# Patient Record
Sex: FEMALE | Race: WHITE | NOT HISPANIC OR LATINO | Employment: OTHER | URBAN - METROPOLITAN AREA
[De-identification: names, ages, dates, MRNs, and addresses within clinical notes are randomized per-mention and may not be internally consistent; named-entity substitution may affect disease eponyms.]

---

## 2017-04-13 ENCOUNTER — ALLSCRIPTS OFFICE VISIT (OUTPATIENT)
Dept: OTHER | Facility: OTHER | Age: 62
End: 2017-04-13

## 2017-04-13 DIAGNOSIS — R73.03 PREDIABETES: ICD-10-CM

## 2017-05-12 ENCOUNTER — GENERIC CONVERSION - ENCOUNTER (OUTPATIENT)
Dept: OTHER | Facility: OTHER | Age: 62
End: 2017-05-12

## 2017-05-12 LAB
A/G RATIO (HISTORICAL): 1.5 (ref 1.2–2.2)
ALBUMIN SERPL BCP-MCNC: 4.3 G/DL (ref 3.5–5.5)
ALP SERPL-CCNC: 98 IU/L (ref 39–117)
ALT SERPL W P-5'-P-CCNC: 14 IU/L (ref 0–32)
AMBIG ABBREV CMP14 DEFAULT (HISTORICAL): NORMAL
AST SERPL W P-5'-P-CCNC: 11 IU/L (ref 0–40)
BILIRUB SERPL-MCNC: 0.2 MG/DL (ref 0–1.2)
BUN SERPL-MCNC: 16 MG/DL (ref 6–24)
BUN/CREA RATIO (HISTORICAL): 25 (ref 9–23)
CALCIUM SERPL-MCNC: 9.6 MG/DL (ref 8.7–10.2)
CHLORIDE SERPL-SCNC: 101 MMOL/L (ref 96–106)
CO2 SERPL-SCNC: 23 MMOL/L (ref 18–29)
CREAT SERPL-MCNC: 0.65 MG/DL (ref 0.57–1)
EGFR AFRICAN AMERICAN (HISTORICAL): 112 ML/MIN/1.73
EGFR-AMERICAN CALC (HISTORICAL): 97 ML/MIN/1.73
GLUCOSE SERPL-MCNC: 104 MG/DL (ref 65–99)
POTASSIUM SERPL-SCNC: 4.6 MMOL/L (ref 3.5–5.2)
SODIUM SERPL-SCNC: 141 MMOL/L (ref 134–144)
TOT. GLOBULIN, SERUM (HISTORICAL): 2.9 G/DL (ref 1.5–4.5)
TOTAL PROTEIN (HISTORICAL): 7.2 G/DL (ref 6–8.5)

## 2017-05-13 LAB — HBA1C MFR BLD HPLC: 5.8 % (ref 4.8–5.6)

## 2017-08-15 ENCOUNTER — ALLSCRIPTS OFFICE VISIT (OUTPATIENT)
Dept: OTHER | Facility: OTHER | Age: 62
End: 2017-08-15

## 2017-08-15 LAB
BILIRUB UR QL STRIP: NORMAL
CLARITY UR: NORMAL
COLOR UR: YELLOW
GLUCOSE (HISTORICAL): NORMAL
HGB UR QL STRIP.AUTO: NORMAL
KETONES UR STRIP-MCNC: NORMAL MG/DL
LEUKOCYTE ESTERASE UR QL STRIP: NORMAL
NITRITE UR QL STRIP: NORMAL
PH UR STRIP.AUTO: 5 [PH]
PROT UR STRIP-MCNC: NORMAL MG/DL
SP GR UR STRIP.AUTO: 1.01
UROBILINOGEN UR QL STRIP.AUTO: NORMAL

## 2017-10-14 ENCOUNTER — ALLSCRIPTS OFFICE VISIT (OUTPATIENT)
Dept: OTHER | Facility: OTHER | Age: 62
End: 2017-10-14

## 2017-10-14 DIAGNOSIS — Z12.31 ENCOUNTER FOR SCREENING MAMMOGRAM FOR MALIGNANT NEOPLASM OF BREAST: ICD-10-CM

## 2017-10-14 DIAGNOSIS — Z13.820 ENCOUNTER FOR SCREENING FOR OSTEOPOROSIS: ICD-10-CM

## 2017-10-23 ENCOUNTER — ALLSCRIPTS OFFICE VISIT (OUTPATIENT)
Dept: OTHER | Facility: OTHER | Age: 62
End: 2017-10-23

## 2017-10-23 LAB
BILIRUB UR QL STRIP: NORMAL
CLARITY UR: NORMAL
COLOR UR: YELLOW
GLUCOSE (HISTORICAL): NORMAL
HGB UR QL STRIP.AUTO: POSITIVE
KETONES UR STRIP-MCNC: NORMAL MG/DL
LEUKOCYTE ESTERASE UR QL STRIP: NORMAL
NITRITE UR QL STRIP: NORMAL
PH UR STRIP.AUTO: 5 [PH]
PROT UR STRIP-MCNC: NORMAL MG/DL
SP GR UR STRIP.AUTO: 1.01
UROBILINOGEN UR QL STRIP.AUTO: NORMAL

## 2017-10-24 ENCOUNTER — LAB REQUISITION (OUTPATIENT)
Dept: LAB | Facility: HOSPITAL | Age: 62
End: 2017-10-24
Payer: COMMERCIAL

## 2017-10-24 DIAGNOSIS — R30.0 DYSURIA: ICD-10-CM

## 2017-10-24 PROCEDURE — 87086 URINE CULTURE/COLONY COUNT: CPT | Performed by: INTERNAL MEDICINE

## 2017-10-25 LAB — BACTERIA UR CULT: NORMAL

## 2017-10-27 ENCOUNTER — GENERIC CONVERSION - ENCOUNTER (OUTPATIENT)
Dept: OTHER | Facility: OTHER | Age: 62
End: 2017-10-27

## 2017-10-27 LAB
A/G RATIO (HISTORICAL): 1.7 (ref 1.2–2.2)
ALBUMIN SERPL BCP-MCNC: 4.3 G/DL (ref 3.6–4.8)
ALP SERPL-CCNC: 104 IU/L (ref 39–117)
ALT SERPL W P-5'-P-CCNC: 15 IU/L (ref 0–32)
AMBIG ABBREV CMP14 DEFAULT (HISTORICAL): NORMAL
AST SERPL W P-5'-P-CCNC: 17 IU/L (ref 0–40)
BASOPHILS # BLD AUTO: 0.1 X10E3/UL (ref 0–0.2)
BASOPHILS # BLD AUTO: 1 %
BILIRUB SERPL-MCNC: 0.3 MG/DL (ref 0–1.2)
BUN SERPL-MCNC: 13 MG/DL (ref 8–27)
BUN/CREA RATIO (HISTORICAL): 19 (ref 12–28)
CALCIUM SERPL-MCNC: 9.4 MG/DL (ref 8.7–10.3)
CHLORIDE SERPL-SCNC: 99 MMOL/L (ref 96–106)
CHOLEST SERPL-MCNC: 183 MG/DL (ref 100–199)
CHOLEST/HDLC SERPL: 2.9 RATIO UNITS (ref 0–4.4)
CO2 SERPL-SCNC: 23 MMOL/L (ref 18–29)
CREAT SERPL-MCNC: 0.68 MG/DL (ref 0.57–1)
DEPRECATED RDW RBC AUTO: 13 % (ref 12.3–15.4)
EGFR AFRICAN AMERICAN (HISTORICAL): 110 ML/MIN/1.73
EGFR-AMERICAN CALC (HISTORICAL): 95 ML/MIN/1.73
EOSINOPHIL # BLD AUTO: 0.1 X10E3/UL (ref 0–0.4)
EOSINOPHIL # BLD AUTO: 1 %
GLUCOSE SERPL-MCNC: 111 MG/DL (ref 65–99)
HCT VFR BLD AUTO: 37.4 % (ref 34–46.6)
HDLC SERPL-MCNC: 63 MG/DL
HGB BLD-MCNC: 12.5 G/DL (ref 11.1–15.9)
IMM.GRANULOCYTES (CD4/8) (HISTORICAL): 0 %
IMM.GRANULOCYTES (CD4/8) (HISTORICAL): 0 X10E3/UL (ref 0–0.1)
LDLC SERPL CALC-MCNC: 106 MG/DL (ref 0–99)
LYMPHOCYTES # BLD AUTO: 1.9 X10E3/UL (ref 0.7–3.1)
LYMPHOCYTES # BLD AUTO: 20 %
MCH RBC QN AUTO: 27.6 PG (ref 26.6–33)
MCHC RBC AUTO-ENTMCNC: 33.4 G/DL (ref 31.5–35.7)
MCV RBC AUTO: 83 FL (ref 79–97)
MONOCYTES # BLD AUTO: 0.9 X10E3/UL (ref 0.1–0.9)
MONOCYTES (HISTORICAL): 10 %
NEUTROPHILS # BLD AUTO: 6.3 X10E3/UL (ref 1.4–7)
NEUTROPHILS # BLD AUTO: 68 %
PLATELET # BLD AUTO: 356 X10E3/UL (ref 150–379)
POTASSIUM SERPL-SCNC: 4.1 MMOL/L (ref 3.5–5.2)
RBC (HISTORICAL): 4.53 X10E6/UL (ref 3.77–5.28)
SODIUM SERPL-SCNC: 141 MMOL/L (ref 134–144)
TOT. GLOBULIN, SERUM (HISTORICAL): 2.6 G/DL (ref 1.5–4.5)
TOTAL PROTEIN (HISTORICAL): 6.9 G/DL (ref 6–8.5)
TRIGL SERPL-MCNC: 69 MG/DL (ref 0–149)
VLDLC SERPL CALC-MCNC: 14 MG/DL (ref 5–40)
WBC # BLD AUTO: 9.2 X10E3/UL (ref 3.4–10.8)

## 2017-10-28 LAB
CREATININE, URINE (HISTORICAL): 145.7 MG/DL
HBA1C MFR BLD HPLC: 5.6 % (ref 4.8–5.6)
MICROALBUM.,U,RANDOM (HISTORICAL): 219.9 UG/ML
MICROALBUMIN/CREATININE RATIO (HISTORICAL): 150.9 MG/G CREAT (ref 0–30)
T4 FREE SERPL-MCNC: 1.22 NG/DL (ref 0.82–1.77)
TSH SERPL DL<=0.05 MIU/L-ACNC: 1.23 UIU/ML (ref 0.45–4.5)

## 2017-10-31 ENCOUNTER — GENERIC CONVERSION - ENCOUNTER (OUTPATIENT)
Dept: OTHER | Facility: OTHER | Age: 62
End: 2017-10-31

## 2017-11-08 ENCOUNTER — ALLSCRIPTS OFFICE VISIT (OUTPATIENT)
Dept: OTHER | Facility: OTHER | Age: 62
End: 2017-11-08

## 2017-11-10 NOTE — PROGRESS NOTES
Assessment  1  Overactive bladder (596 51) (N32 81)    Plan  Overactive bladder    · Myrbetriq 50 MG Oral Tablet Extended Release 24 Hour; Take 1 tablet once daily   Rx By: Ed Blair; Dispense: 30 Days ; #:30 Tablet Extended Release 24 Hour; Refill: 11; Overactive bladder; AISHWARYA = N; Faxed To: [facility]/PHARMACY #[id number]  · Oxybutynin Chloride ER 10 MG Oral Tablet Extended Release 24 Hour; Take 1tablet daily   Rx By: Ed Blair; Dispense: 30 Days ; #:30 Tablet Extended Release 24 Hour; Refill: 11; Overactive bladder; AISHWARYA = N; Faxed To: [facility]/PHARMACY #[id number]   · Follow-up visit in 6 months Evaluation and Treatment  Follow-up  Status: Hold For -Scheduling,Retrospective Authorization  Requested for: 16OCT1455   Ordered; Overactive bladder; Ordered By: Ed Blair Performed:  Due: 46MFM2060; Last Updated By: Ed Blair; 11/8/2017 2:11:32 PM   · Measure Post Void Residual - POC; Status:Active - Perform Order; Requestedfor:57Qpx5983;    Perform: In Office; MJZ:98UJC4583; Ordered;bladder; Ordered By:Crow Mcfarlane; Discussion/Summary  Discussion Summary:   1  overactive bladder -- managed by Dr Corrina Connelly is happy with the combination of anticholinergic and beta agonistshe will be switched to oxybutynin 10 mg extended release given high co-pay of Vesicare  She'll continue to take Myrbetriq 50 mg daily  Briefly reviewed tertiary treatments of overactive bladder should symptoms worsen in the future  Patient will follow-up in 6 months with a PVR for symptom reassessment  She is aware to contact us earlier if any urologic concern  All questions answered         Chief Complaint  Chief Complaint Free Text Note Form: patient presents for OAB; urge incontinence      History of Present Illness  HPI: Rafat Segovia is a 29-year-old female patient of Dr Leoncio Osborn with a history of overactive bladder presenting for 3 month follow-up   main urinary complaints include urinary urgency, frequency, and urge incontinence  Patient was initially on Vesicare 5 mg and then increased to Vesicare 10 mg with some dry mouth  Her last appointment beta agonist Myrbetriq 50 mg was added  Patient states that the combination of the 2 medications provides her urinary relief in which she is not having incontinence anymore  She still wears one pad per day as a precautionary measure  She denies any significant side effects  does mention that Vesicare has a high co-pay and is interested in trying another anticholinergic  in the office today reveal 65 mL  Review of Systems  Complete-Female Urology:  Constitutional: No fever, no chills, feels well, no tiredness, no recent weight gain or weight loss  Respiratory: No complaints of shortness of breath, no wheezing, no cough, no SOB on exertion, no orthopnea, no PND  Cardiovascular: No complaints of slow heart rate, no fast heart rate, no chest pain, no palpitations, no leg claudication, no lower extremity edema  Gastrointestinal: No complaints of abdominal pain, no constipation, no nausea or vomiting, no diarrhea, no bloody stools  Genitourinary: feelings of urinary urgency,-- Empty sensation,-- incontinence-- and-- stream quality good, but-- no dysuria,-- no urinary hesitancy,-- no hematuria-- and-- no nocturia  Musculoskeletal: No complaints of arthralgias, no myalgias, no joint swelling or stiffness, no limb pain or swelling  Integumentary: No complaints of skin rash or lesions, no itching, no skin wounds, no breast pain or lump  Hematologic/Lymphatic: No complaints of swollen glands, no swollen glands in the neck, does not bleed easily, does not bruise easily  Neurological: No complaints of headache, no confusion, no convulsions, no numbness, no dizziness or fainting, no tingling, no limb weakness, no difficulty walking  ROS Reviewed:   ROS reviewed  Active Problems  1  Bacterial vaginosis (616 10,041 9) (N76 0,B96 89)   2   Benign essential hypertension (401 1) (I10)   3  Candidiasis (112 9) (B37 9)   4  Colonoscopy (Fiberoptic) Screening   5  Depression (311) (F32 9)   6  Dyslipidemia (272 4) (E78 5)   7  Dysuria (788 1) (R30 0)   8  Obesity (278 00) (E66 9)   9  Other abnormal glucose (790 29) (R73 09)   10  Overactive bladder (596 51) (N32 81)   11  Postmenopause bleeding (627 1) (N95 0)   12  Prediabetes (790 29) (R73 03)   13  Vitamin D deficiency (268 9) (E55 9)    Past Medical History  1  History of Abnormal weight gain (783 1) (R63 5)   2  History of Candidiasis (112 9) (B37 9)   3  History of Change in bowel habits (787 99) (R19 4)   4  Depression (311) (F32 9)   5  History of Encounter for screening for malignant neoplasm of colon (V76 51) (Z12 11)   6  History of hyperglycemia (V12 29) (Z86 39)   7  History of obesity (V13 89) (Z86 39)   8  History of screening mammography (V15 89) (Z92 89)   9  Denied: History of substance abuse   10  History of Morbid or severe obesity due to excess calories (278 01) (E66 01)   11  History of S/P bariatric surgery (V45 86) (Z98 84)   12  History of Screening for osteoporosis (V82 81) (Z13 820)   13  History of Symptoms of urinary tract infection (788 99) (R39 9)   14  History of Well adult on routine health check (V70 0) (Z00 00)  Active Problems And Past Medical History Reviewed: The active problems and past medical history were reviewed and updated today  Surgical History  1  History of Cholecystectomy   2  History of Gastric Surgery For Morbid Obesity   3  History of Tonsillectomy  Surgical History Reviewed: The surgical history was reviewed and updated today  Family History  Mother    1  Family history of Family Health Status Of Mother - Alive  Father    2  Family history of Father  At Age 80  Family History    3  Denied: Family history of depression   4  Denied: Family history of substance abuse  Family History Reviewed: The family history was reviewed and updated today         Social History     · Denied: History of Drug use   · Never A Smoker   · Never Drank Alcohol  Social History Reviewed: The social history was reviewed and is unchanged  Current Meds   1  Abilify 5 MG Oral Tablet; one tab day; Therapy: 75GEG0815 to Recorded   2  ARIPiprazole 10 MG Oral Tablet; Therapy: 75XAE0037 to Recorded   3  Aspirin EC 81 MG Oral Tablet Delayed Release; Therapy: 36VIE4020 to Recorded   4  Biotin TABS; Therapy: (0498 72 13 49) to Recorded   5  Gemfibrozil 600 MG Oral Tablet; TAKE 1 TABLET DAILY; Therapy: 65ZXT0422 to (Evaluate:07Gtb4777)  Requested for: 33BAI5006; Last Rx:94Wzu8160 Ordered   6  Iron 28 MG Oral Tablet; Therapy: (0498 72 13 49) to Recorded   7  MetFORMIN HCl - 500 MG Oral Tablet; once daily; Therapy: 38JAB0747 to (Last Rx:72Mbq6773)  Requested for: 13XXH8754 Ordered   8  MetroNIDAZOLE 500 MG Oral Tablet; TAKE 1 TABLET 3 TIMES DAILY UNTIL GONE; Therapy: 61UCI6886 to (Char White)  Requested for: 40ACE6901; Last JW:64RXL7791 Ordered   9  Myrbetriq 50 MG Oral Tablet Extended Release 24 Hour; Take 1 tablet daily; Therapy: 46JIX5410 to (Evaluate:45Jmr0842)  Requested for: 13JRU7747; Last Rx:00Edx9402 Ordered   10  Quinapril HCl - 5 MG Oral Tablet; take 1 tablet by mouth every day; Therapy: 02QXG2247 to (Evaluate:63Nnv2294)  Requested for: 81PSL9342; Last  Rx:59Kwe1299 Ordered   11  Venlafaxine HCl ER 75 MG Oral Capsule Extended Release 24 Hour; 3 tabs/day; Therapy: 93OFD9398 to Recorded   12  Vitamin D 1000 UNIT CAPS; 2 tabs daily; Therapy: 98CQX1877 to Recorded   13  Vyvanse 70 MG Oral Capsule; TAKE 1 CAPSULE DAILY IN THE MORNING; Therapy: 54CZR4396 to Recorded  Medication List Reviewed: The medication list was reviewed and updated today  Allergies  1   No Known Drug Allergies    Vitals  Vital Signs    Recorded: 24HAP5094 01:44PM   Heart Rate 86   Systolic 026   Diastolic 88   Height 4 ft 11 in   Weight 225 lb 2 oz   BMI Calculated 45 47   BSA Calculated 1 94       Physical Exam   Constitutional  General appearance: No acute distress, well appearing and well nourished  Pulmonary  Respiratory effort: No increased work of breathing or signs of respiratory distress  Cardiovascular  Examination of extremities for edema and/or varicosities: Normal    Musculoskeletal  Gait and station: Normal    Skin  Skin and subcutaneous tissue: Normal without rashes or lesions  Additional Exam:  no focal neurologic deficits  Mood and affect appropriate  Procedure   Procedure: Bladder Ultrasound Post Void Residual   Test indication: Urge Incontinence  Equipment And Procedure: The patient voided  U/S Findings: bladder scan = 65ml  Future Appointments    Date/Time Provider Specialty Site   11/17/2017 09:30 AM AFSHAN Lim   Obstetrics/Gynecology [facility]   03/10/2018 08:00 AM Hiram Louie DO Internal Medicine [facility]       Signatures   Electronically signed by : Jailyn Galdamez, ; Nov 8 2017  2:23PM EST                       (Author)    Electronically signed by : Cipriano Gao MD; Nov 8 2017  2:38PM EST

## 2017-11-17 ENCOUNTER — ALLSCRIPTS OFFICE VISIT (OUTPATIENT)
Dept: OTHER | Facility: OTHER | Age: 62
End: 2017-11-17

## 2017-11-17 ENCOUNTER — GENERIC CONVERSION - ENCOUNTER (OUTPATIENT)
Dept: OTHER | Facility: OTHER | Age: 62
End: 2017-11-17

## 2017-11-22 ENCOUNTER — GENERIC CONVERSION - ENCOUNTER (OUTPATIENT)
Dept: OTHER | Facility: OTHER | Age: 62
End: 2017-11-22

## 2017-11-22 LAB
ADEQUACY: (HISTORICAL): NORMAL
CLINICIAN PROVIDIED ICD 9 OR 10 (HISTORICAL): NORMAL
COMMENT (HISTORICAL): NORMAL
DIAGNOSIS (HISTORICAL): NORMAL
HPV HIGH RISK (HISTORICAL): NEGATIVE
PERFORMED BY (HISTORICAL): NORMAL
TEST INFORMATION (HISTORICAL): NORMAL

## 2017-11-29 ENCOUNTER — GENERIC CONVERSION - ENCOUNTER (OUTPATIENT)
Dept: OTHER | Facility: OTHER | Age: 62
End: 2017-11-29

## 2017-11-29 LAB
. (HISTORICAL): NORMAL

## 2017-12-05 ENCOUNTER — ALLSCRIPTS OFFICE VISIT (OUTPATIENT)
Dept: OTHER | Facility: OTHER | Age: 62
End: 2017-12-05

## 2017-12-06 ENCOUNTER — ANESTHESIA EVENT (OUTPATIENT)
Dept: PERIOP | Facility: HOSPITAL | Age: 62
DRG: 907 | End: 2017-12-06
Payer: COMMERCIAL

## 2017-12-06 NOTE — CONSULTS
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  Plan  Endometrial cancer    · Schedule Surgery Treatment  Procedure  Status: Hold For - Scheduling  Requested for:91Lid8065   Ordered; Endometrial cancer; Ordered By: Michelle Multani Performed:  Due: 29IRE0204  The patient is an ideal candidate for minimally invasive procedure  The patient will sign informed consent for a robotic assisted total laparoscopic hysterectomy, bilateral salpingo-oophorectomy, sentinel lymph node dissection, possible exploratory laparotomy  The patient understands the risks, benefits and alternative treatments and wishes to proceed  Chief Complaint  Chief Complaint Free Text Note Form: I have uterine cancer  History of Present Illness  Problem [place]:   1) FIGO 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morbid obesity with BMI of 47  Previous Therapy:   None   Free Text HPI: The patient is a delightful 60-year-old  0 with biopsy-proven endometrial cancer  She presents for evaluation and treatment  The patient presented to her generalist with a chief complaint of postmenopausal bleeding since 2017  Of note she went through menopause at age 50 and has never been on hormone replacement therapy  Review of Systems  Complete-Female Gyn Onc:  Constitutional: no fever,-- not feeling poorly,-- no recent weight gain,-- no chills,-- not feeling tired-- and-- no recent weight loss  Eyes: no eye pain,-- no eyesight problems,-- no dryness of the eyes,-- eyes not red,-- no purulent discharge from the eyes-- and-- no itching of the eyes  ENT: no earache,-- no nosebleeds,-- no sore throat,-- no hearing loss,-- no nasal discharge-- and-- no hoarseness  Cardiovascular: the heart rate was not slow,-- no chest pain,-- no intermittent leg claudication,-- the heart rate was not fast,-- no palpitations-- and-- no lower extremity edema    Respiratory: no shortness of breath,-- no cough,-- no orthopnea,-- no wheezing,-- no shortness of breath during exertion-- and-- no PND  Gastrointestinal: no abdominal pain,-- no nausea,-- no vomiting,-- no constipation,-- no diarrhea-- and-- no blood in stools  Genitourinary: vaginal discharge,-- incontinence-- and-- unexplained vaginal bleeding, but-- no dysuria,-- no pelvic pain,-- no dysmenorrhea,-- no pelvic pressure,-- no vulvar itching-- and-- no hematuria  Musculoskeletal: no arthralgias,-- no joint swelling,-- no limb pain,-- no myalgias,-- no joint stiffness-- and-- no limb swelling  Integumentary: no rashes,-- no itching,-- no breast pain,-- no skin lesions,-- no skin wound-- and-- no breast lump  Neurological: no headache,-- no numbness,-- no tingling,-- no confusion,-- no dizziness,-- no limb weakness,-- no convulsions,-- no fainting-- and-- no difficulty walking  Psychiatric: depression, but-- not suicidal,-- no anxiety,-- no personality change,-- no sleep disturbances-- and-- no emotional problems  Endocrine: no muscle weakness,-- no hot flashes-- and-- no deepening of the voice  no feelings of weakness  Hematologic/Lymphatic: no swollen glands,-- no tendency for easy bleeding,-- no tendency for easy bruising-- and-- no swollen glands in the neck  ROS Reviewed:   ROS reviewed  Active Problems  1  Benign essential hypertension (401 1) (I10)   2  Colonoscopy (Fiberoptic) Screening   3  Depression (311) (F32 9)   4  Dyslipidemia (272 4) (E78 5)   5  Dysuria (788 1) (R30 0)   6  Endometrial cancer (182 0) (C54 1)   7  Obesity (278 00) (E66 9)   8  Other abnormal glucose (790 29) (R73 09)   9  Overactive bladder (596 51) (N32 81)   10  Postmenopause bleeding (627 1) (N95 0)   11  Prediabetes (790 29) (R73 03)   12  Vitamin D deficiency (268 9) (E55 9)    Past Medical History    1  History of Bacterial vaginosis (616 10,041 9) (N76 0,B96 89)   2  History of Candidiasis (112 9) (B37 9)   3   History of Change in bowel habits (787 99) (R19 4)   4  Depression (311) (F32 9)   5  History of Encounter for screening for malignant neoplasm of colon (V76 51) (Z12 11)   6  History of Encounter for well woman exam with routine gynecological exam (V72 31) (Z01 419)   7  History of candidiasis (V12 09) (Z86 19)   8  History of hyperglycemia (V12 29) (Z86 39)   9  History of obesity (V12 29) (Z86 39)   10  History of screening mammography (V15 89) (Z92 89)   11  Denied: History of substance abuse   12  History of Morbid or severe obesity due to excess calories (278 01) (E66 01)   13  History of S/P bariatric surgery (V45 86) (Z98 84)   14  History of Screening for osteoporosis (V82 81) (Z13 820)   15  History of Symptoms of urinary tract infection (788 99) (R39 9)   16  History of Well adult on routine health check (V70 0) (Z00 00)  GYN Onc Past GYN History [facility]:  Patient GYN History: First period was age 10,--  0,-- Para 0-- and-- menopause at age 50, but-- no abnormal pap smears in the past-- and-- no history of STD(s)  Active Problems And Past Medical History Reviewed: The active problems and past medical history were reviewed and updated today  Surgical History  1  History of Cholecystectomy   2  History of Gastric Surgery For Morbid Obesity   3  History of Tonsillectomy  Surgical History Reviewed: The surgical history was reviewed and updated today  Family History  Mother    1  Family history of Family Health Status Of Mother - Alive   2  Family history of malignant neoplasm of ovary (V16 41) (Z80 41)  Father    3  Family history of Hodgkin's lymphoma (V16 7) (Z80 7)   4  Family history of Father  At Age 80  Maternal Grandfather    5  Family history of malignant neoplasm of bone (V16 8) (Z80 8)  Family History    6  Denied: Family history of depression   7  Denied: Family history of substance abuse  Family History Reviewed: The family history was reviewed and updated today         Social History     · Denied: History of Drug use   · Never A Smoker   · Never Drank Alcohol   · Occupation  Social History Reviewed: The social history was reviewed and is unchanged  Current Meds   1  ARIPiprazole 10 MG Oral Tablet; Therapy: 87BYM4305 to Recorded   2  Aspirin EC 81 MG Oral Tablet Delayed Release; Therapy: 97NAR8147 to Recorded   3  Biotin TABS; Therapy: (433 6144) to Recorded   4  Gemfibrozil 600 MG Oral Tablet; TAKE 1 TABLET DAILY; Therapy: 44TNG1364 to (Evaluate:55Gib5044)  Requested for: 30VSW6840; Last Rx:00Fpw4972 Ordered   5  Iron 28 MG Oral Tablet; Therapy: (433 6144) to Recorded   6  MetFORMIN HCl - 500 MG Oral Tablet; once daily; Therapy: 03YNV7521 to (Last Rx:97Ixv0015)  Requested for: 11FYF8780 Ordered   7  Mydayis 50 MG Oral Capsule Extended Release 24 Hour; take 1 capsule daily; Therapy: (Recorded:52Pwf5387) to Recorded   8  Myrbetriq 50 MG Oral Tablet Extended Release 24 Hour; Take 1 tablet daily; Therapy: 20YSF0404 to (Evaluate:45Lpl1854)  Requested for: 19IHU0089; Last Rx:69Abz3935 Ordered   9  Oxybutynin Chloride ER 10 MG Oral Tablet Extended Release 24 Hour; Take 1 tablet daily; Therapy: 09STP6450 to (Evaluate:03Nov2018); Last Rx:08Nov2017 Ordered   10  Quinapril HCl - 5 MG Oral Tablet; take 1 tablet by mouth every day; Therapy: 64BYG6676 to (Shira Mulligan)  Requested for: 14HXK2683; Last  Rx:29Nov2017 Ordered   11  Venlafaxine HCl ER 75 MG Oral Capsule Extended Release 24 Hour; 3 tabs/day; Therapy: 48VYY6223 to Recorded   12  Vitamin D 1000 UNIT CAPS; 2 tabs daily; Therapy: 44PJV4333 to Recorded  Medication List Reviewed: The medication list was reviewed and updated today  Allergies  1  No Known Drug Allergies  2  No Known Environmental Allergies   3   No Known Food Allergies    Vitals  Vital Signs    Recorded: 82UYA3721 01:01PM   Temperature 98 4 F, Oral   Heart Rate 72, L Radial   Pulse Quality Normal, L Radial   Respiration Quality Normal Respiration 16   Systolic 733, LUE, Sitting   Diastolic 78, LUE, Sitting   Height 4 ft 10 25 in   Weight 225 lb 1 oz   BMI Calculated 46 63   BSA Calculated 1 92       Physical Exam   Constitutional  General appearance: No acute distress, well appearing and well nourished  Neck  Neck: Normal, supple, trachea midline, no masses  Thyroid: Normal, no thyromegaly  Pulmonary  Respiratory effort: No increased work of breathing or signs of respiratory distress  Auscultation of lungs: Clear to auscultation  Cardiovascular  Auscultation of heart: Normal rate and rhythm, normal S1 and S2, no murmurs  Peripheral vascular exam: Normal pulses throughout  No edema noted in lower extremities  Genitourinary  External genitalia: Normal and no lesions appreciated  Vagina: Normal, no lesions or dryness appreciated  Urethra: Normal    Urethral meatus: Normal    Bladder: Normal, soft, non-tender and no prolapse or masses appreciated  Cervix: Normal, no palpable masses  Uterus: Normal, non-tender, not enlarged, and no palpaple masses  Adnexa/parametria: Normal, non-tender and no fullness or masses appreciated  Anus, perineum, and rectum: Normal sphincter tone, no masses, and no prolapse  Chest  Breasts: Normal and no dimpling or skin changes noted  Abdomen  Abdomen: Normal, soft, non-tender, and no organomegaly noted  Liver and spleen: No hepatomegaly or splenomegaly  Examination for hernias: No hernias appreciated  Lymphatic  Palpation of lymph nodes in neck, axillae, groin and/or other locations: No lymphadenopathy or masses noted  Skin  Skin and subcutaneous tissue: Normal skin turgor and no rashes  Palpation of skin and subcutaneous tissue: Normal    Psychiatric  Orientation to person, place, and time: Normal    Mood and affect: Normal    GOG performance status is: 0      Results/Data  [facility]) Pathology Report [id number] 03:40AM Deepa Chin     Test Name Result Flag Reference     Material submitted: Joanna Araujo PART A: ENDOMETRIAL BIOPSY PART B: ENDOCERVICAL POLYP     Material submitted: Joanna Araujo PART A: ENDOMETRIAL BIOPSY PART B: ENDOCERVICAL POLYP     Clinician provided ICD-10: N95 0     Clinician provided ICD-10: N95 0     (Report)       ********************************************************************** Diagnosis: Part A: ENDOMETRIAL BIOPSY: WELL DIFFERENTIATED ENDOMETRIAL ADENOCARCINOMA (FIGO I) WITH FOCAL MUCINOUS FEATURES  The case received intradepartmental review  Part B: ENDOCERVICAL POLYP: BENIGN ENDOCERVICAL POLYP  L/11/28/2017 **********************************************************************     ********************************************************************** Diagnosis: Part A: ENDOMETRIAL BIOPSY: WELL DIFFERENTIATED ENDOMETRIAL ADENOCARCINOMA (FIGO I) WITH FOCAL MUCINOUS FEATURES  The case received intradepartmental review  Part B: ENDOCERVICAL POLYP: BENIGN ENDOCERVICAL POLYP  L/11/28/2017 **********************************************************************     (Report)       Addendum:                              RESULTS OF THE BIOPSY HAS BEEN REPORTED TO MINE CARPENTER(RN) VIA TELEPHONE BY KETRA WHITE ON 11/29/17 @ 2:36 AND THE REPORT WAS REC'D ELECTRONICALLY KQW/11/29/2017 Addendum Electronically Signed by Enedina Richardson MD, Pathologist     Addendum:                                                         RESULTS OF THE BIOPSY HAS BEEN REPORTED TO MINE CARPENTER(RN) VIA TELEPHONE BY KETRA WHITE ON 11/29/17 @ 2:36 AND THE REPORT WAS REC'D ELECTRONICALLY KQW/11/29/2017 Addendum Electronically Signed by Enedina Richardson MD, Pathologist            Electronically signed: Joanna Richardson MD, Pathologist     Electronically signed: Joanna Richardson MD, Pathologist     (Report)       Gross description:                       2 Containers, formalin-filled, labeled with patient identification  Part A: ENDOMETRIAL BIOPSY: Received labeled EMB are multiple fragments of tan material measuring 6 2 x 1 2 x 0 2 cm in aggregate  Submitted in toto in cassette(s) 2  Part B: ENDOCERVICAL POLYP: Received labeled ENDOCERVICAL POLYP is 1 polypoid fragment(s) of soft tan tissue measuring 1 0 x 1 0 x 0 3 cm  The specimen(s) is serially sectioned and submitted in toto in cassette(s) 1  Trevor Fried     Gross description:                                           2 Containers, formalin-filled, labeled with patient identification  Part A: ENDOMETRIAL BIOPSY: Received labeled EMB are multiple fragments of tan material measuring 6 2 x 1 2 x 0 2 cm in aggregate  Submitted in toto in cassette(s) 2  Part B: ENDOCERVICAL POLYP: Received labeled ENDOCERVICAL POLYP is 1 polypoid fragment(s) of soft tan tissue measuring 1 0 x 1 0 x 0 3 cm  The specimen(s) is serially sectioned and submitted in toto in cassette(s) 1  Trevor Fried     Pathologist provided ICD-10: C54 1, N84 1     Pathologist provided ICD-10: C54 1, N84 1     CPT                              111336, 683772     CPT                                                           471774, 276900     Future Appointments    Date/Time Provider Specialty Site   03/10/2018 08:00 AM Melanie Thomson DO Internal Medicine [facility]   05/08/2018 02:45 PM Fletcher Tafoya, [address] Urology [facility] Fang Alatorre       Signatures   Electronically signed by : Nancy Griffith MD; Dec  5 2017  1:44PM EST                       (Author)

## 2017-12-14 ENCOUNTER — TRANSCRIBE ORDERS (OUTPATIENT)
Dept: LAB | Facility: CLINIC | Age: 62
End: 2017-12-14

## 2017-12-14 ENCOUNTER — APPOINTMENT (OUTPATIENT)
Dept: LAB | Facility: CLINIC | Age: 62
End: 2017-12-14
Payer: COMMERCIAL

## 2017-12-14 ENCOUNTER — GENERIC CONVERSION - ENCOUNTER (OUTPATIENT)
Dept: GYNECOLOGIC ONCOLOGY | Facility: CLINIC | Age: 62
End: 2017-12-14

## 2017-12-14 DIAGNOSIS — C54.1 ENDOMETRIAL SARCOMA (HCC): ICD-10-CM

## 2017-12-14 DIAGNOSIS — C54.1 ENDOMETRIAL SARCOMA (HCC): Primary | ICD-10-CM

## 2017-12-14 PROCEDURE — 93005 ELECTROCARDIOGRAM TRACING: CPT

## 2017-12-15 LAB
ATRIAL RATE: 73 BPM
P AXIS: 33 DEGREES
PR INTERVAL: 154 MS
QRS AXIS: -34 DEGREES
QRSD INTERVAL: 96 MS
QT INTERVAL: 394 MS
QTC INTERVAL: 434 MS
T WAVE AXIS: 20 DEGREES
VENTRICULAR RATE: 73 BPM

## 2017-12-15 RX ORDER — DIPHENHYDRAMINE HYDROCHLORIDE 25 MG/1
TABLET ORAL
COMMUNITY
End: 2019-04-04

## 2017-12-15 RX ORDER — MELATONIN
1000 DAILY
COMMUNITY

## 2017-12-15 RX ORDER — OXYBUTYNIN CHLORIDE 10 MG/1
10 TABLET, EXTENDED RELEASE ORAL
COMMUNITY
End: 2018-11-23 | Stop reason: SDUPTHER

## 2017-12-15 RX ORDER — ARIPIPRAZOLE 10 MG/1
20 TABLET ORAL DAILY
COMMUNITY
End: 2022-06-09 | Stop reason: ALTCHOICE

## 2017-12-15 RX ORDER — VENLAFAXINE 75 MG/1
75 TABLET ORAL 3 TIMES DAILY
COMMUNITY

## 2017-12-15 RX ORDER — ASPIRIN 81 MG/1
81 TABLET ORAL DAILY
COMMUNITY

## 2017-12-15 RX ORDER — QUINAPRIL 5 1/1
5 TABLET ORAL
COMMUNITY
End: 2018-09-18 | Stop reason: SDUPTHER

## 2017-12-15 RX ORDER — GEMFIBROZIL 600 MG/1
600 TABLET, FILM COATED ORAL DAILY
COMMUNITY
End: 2018-09-18 | Stop reason: SDUPTHER

## 2017-12-15 NOTE — PRE-PROCEDURE INSTRUCTIONS
Pre-Surgery Instructions:   Medication Instructions    Amphet-Dextroamphet 3-Bead ER (MYDAYIS) 50 MG CP24 Patient was instructed per "E-Preop"    ARIPiprazole (ABILIFY) 10 mg tablet Patient was instructed per "E-Preop"    aspirin (ECOTRIN LOW STRENGTH) 81 mg EC tablet Patient was instructed per "E-Preop"    Biotin (BIOTIN 5000) 5 MG CAPS Patient was instructed per "E-Preop"    cholecalciferol (VITAMIN D3) 1,000 units tablet Patient was instructed per "E-Preop"    Ferrous Sulfate (IRON) 28 MG TABS Patient was instructed per "E-Preop"    gemfibrozil (LOPID) 600 mg tablet Patient was instructed per "E-Preop"    metFORMIN (GLUCOPHAGE) 500 mg tablet Patient was instructed per "E-Preop"    Mirabegron (MYRBETRIQ PO) Patient was instructed per "E-Preop"    oxybutynin (DITROPAN-XL) 10 MG 24 hr tablet Patient was instructed per "E-Preop"    quinapril (ACCUPRIL) 5 mg tablet Patient was instructed per "E-Preop"    venlafaxine (EFFEXOR) 75 mg tablet Patient was instructed per "E-Preop"    REVIEWED  PRINTED SURGICAL INSTRUCTIONS WITH PATIENT , PATIENT VERBALIZED UNDERSTANDING  MEDICATIONS REVIEWED  Medication Instruction (ACE/ARB - Blood Pressure Medication)   Please do not take this medication on the morning of your day of surgery  Please restart your medications as soon as clinically feasible  quinapril         Medication Instruction (Neuroleptic Medication)   Please continue to take this medication on your normal schedule  If this is an oral medication and you take in the morning, you may do so with a sip of water  aripiprazole         Medication Instruction (Aspirin - Blood Thinners)   Your surgeon may have you stop aspirin up to a week early if you are having intracranial, spine, middle ear, posterior eye or prostate surgery  If not please continue to take this medication on your normal schedule  If you take this in the morning you may do so with a sip of water     Aspirin EC 81 MG Oral Tablet Delayed Release Medication Instruction (Diabetic Medication)   If you are taking long-acting insulin (i e  glargine or Lantus) please only take one-half your usual nighttime dose the night before surgery  If you are taking short-acting insulin or oral diabetes medications, then omit the morning dose the day of surgery  If taking metformin (i e  Glucophage), discontinue this medication for 24 hours prior to surgery  If you have an insulin pump, continue pump the morning of surgery at a basal rate only  Diabetes mellitus, Type 2, , MetFORMIN HCl - 500 MG Oral Tablet         Medication Instruction (Cholesterol Medication)   Please continue the following medications up to the evening before surgery, but do not take it on the day of surgery  gemfibrozil         NPO Instructions   Please do not eat or drink anything prior to your surgery as follows: For AM Surgery times = stop at midnight the night before  For PM Surgery times = Midnight to 8AM clear liquids only (Jello, broth, 7up, Sprite, apple juice, black coffee, black tea, Gatorade)  If you are supposed to take any of your medications, do so with a sip of water  Failure to follow these instructions can lead to an increased risk of lung complications and may result in a delay or cancellation of your procedure  If you have any questions, contact your institution for further instructions  Medical Procedure Risk         Medication Instruction (SSRI - Antidepressants)   Please continue to take this medication on your normal schedule  If this is an oral medication and you take in the morning, you may do so with a sip of water     venlafaxine

## 2017-12-19 ENCOUNTER — HOSPITAL ENCOUNTER (INPATIENT)
Facility: HOSPITAL | Age: 62
LOS: 2 days | Discharge: HOME | DRG: 907 | End: 2017-12-21
Attending: OBSTETRICS & GYNECOLOGY | Admitting: OBSTETRICS & GYNECOLOGY
Payer: COMMERCIAL

## 2017-12-19 ENCOUNTER — ANESTHESIA EVENT (INPATIENT)
Dept: PERIOP | Facility: HOSPITAL | Age: 62
DRG: 907 | End: 2017-12-19
Payer: COMMERCIAL

## 2017-12-19 ENCOUNTER — ANESTHESIA (OUTPATIENT)
Dept: PERIOP | Facility: HOSPITAL | Age: 62
DRG: 907 | End: 2017-12-19
Payer: COMMERCIAL

## 2017-12-19 ENCOUNTER — ANESTHESIA (INPATIENT)
Dept: PERIOP | Facility: HOSPITAL | Age: 62
DRG: 907 | End: 2017-12-19
Payer: COMMERCIAL

## 2017-12-19 DIAGNOSIS — D64.9 POSTOPERATIVE ANEMIA: ICD-10-CM

## 2017-12-19 DIAGNOSIS — C54.1 MALIGNANT NEOPLASM OF ENDOMETRIUM (HCC): ICD-10-CM

## 2017-12-19 DIAGNOSIS — Z90.710 S/P HYSTERECTOMY: Primary | ICD-10-CM

## 2017-12-19 PROBLEM — I10 BENIGN ESSENTIAL HYPERTENSION: Status: ACTIVE | Noted: 2017-12-19

## 2017-12-19 PROBLEM — R73.03 PREDIABETES: Status: ACTIVE | Noted: 2017-12-19

## 2017-12-19 PROBLEM — E66.9 OBESITY: Status: ACTIVE | Noted: 2017-12-19

## 2017-12-19 PROBLEM — R73.09 OTHER ABNORMAL GLUCOSE: Status: ACTIVE | Noted: 2017-12-19

## 2017-12-19 PROBLEM — N32.81 OVERACTIVE BLADDER: Status: ACTIVE | Noted: 2017-12-19

## 2017-12-19 PROBLEM — E55.9 VITAMIN D DEFICIENCY: Status: ACTIVE | Noted: 2017-12-19

## 2017-12-19 LAB
ABO GROUP BLD: NORMAL
ALBUMIN SERPL BCP-MCNC: 2.5 G/DL (ref 3.5–5)
ALP SERPL-CCNC: 99 U/L (ref 46–116)
ALT SERPL W P-5'-P-CCNC: 66 U/L (ref 12–78)
ANION GAP SERPL CALCULATED.3IONS-SCNC: 10 MMOL/L (ref 4–13)
ANION GAP SERPL CALCULATED.3IONS-SCNC: 9 MMOL/L (ref 4–13)
AST SERPL W P-5'-P-CCNC: 92 U/L (ref 5–45)
ATRIAL RATE: 83 BPM
BASE EXCESS BLDA CALC-SCNC: -5 MMOL/L (ref -2–3)
BASOPHILS # BLD MANUAL: 0 THOUSAND/UL (ref 0–0.1)
BASOPHILS NFR MAR MANUAL: 0 % (ref 0–1)
BILIRUB SERPL-MCNC: 0.32 MG/DL (ref 0.2–1)
BLD GP AB SCN SERPL QL: NEGATIVE
BUN SERPL-MCNC: 13 MG/DL (ref 5–25)
BUN SERPL-MCNC: 14 MG/DL (ref 5–25)
CA-I BLD-SCNC: 1.17 MMOL/L (ref 1.12–1.32)
CALCIUM SERPL-MCNC: 7.3 MG/DL (ref 8.3–10.1)
CALCIUM SERPL-MCNC: 8 MG/DL (ref 8.3–10.1)
CHLORIDE SERPL-SCNC: 105 MMOL/L (ref 100–108)
CHLORIDE SERPL-SCNC: 107 MMOL/L (ref 100–108)
CO2 SERPL-SCNC: 22 MMOL/L (ref 21–32)
CO2 SERPL-SCNC: 22 MMOL/L (ref 21–32)
CREAT SERPL-MCNC: 0.61 MG/DL (ref 0.6–1.3)
CREAT SERPL-MCNC: 0.8 MG/DL (ref 0.6–1.3)
EOSINOPHIL # BLD MANUAL: 0 THOUSAND/UL (ref 0–0.4)
EOSINOPHIL NFR BLD MANUAL: 0 % (ref 0–6)
ERYTHROCYTE [DISTWIDTH] IN BLOOD BY AUTOMATED COUNT: 13 % (ref 11.6–15.1)
ERYTHROCYTE [DISTWIDTH] IN BLOOD BY AUTOMATED COUNT: 13.3 % (ref 11.6–15.1)
ERYTHROCYTE [DISTWIDTH] IN BLOOD BY AUTOMATED COUNT: 13.4 % (ref 11.6–15.1)
GFR SERPL CREATININE-BSD FRML MDRD: 80 ML/MIN/1.73SQ M
GFR SERPL CREATININE-BSD FRML MDRD: 99 ML/MIN/1.73SQ M
GLUCOSE SERPL-MCNC: 102 MG/DL (ref 65–140)
GLUCOSE SERPL-MCNC: 148 MG/DL (ref 65–140)
GLUCOSE SERPL-MCNC: 153 MG/DL (ref 65–140)
GLUCOSE SERPL-MCNC: 210 MG/DL (ref 65–140)
GLUCOSE SERPL-MCNC: 219 MG/DL (ref 65–140)
GLUCOSE SERPL-MCNC: 248 MG/DL (ref 65–140)
GLUCOSE SERPL-MCNC: 328 MG/DL (ref 65–140)
HCO3 BLDA-SCNC: 21.9 MMOL/L (ref 24–30)
HCT VFR BLD AUTO: 22.8 % (ref 34.8–46.1)
HCT VFR BLD AUTO: 27.1 % (ref 34.8–46.1)
HCT VFR BLD AUTO: 37.5 % (ref 34.8–46.1)
HCT VFR BLD CALC: 26 % (ref 34.8–46.1)
HGB BLD-MCNC: 13 G/DL (ref 11.5–15.4)
HGB BLD-MCNC: 7.5 G/DL (ref 11.5–15.4)
HGB BLD-MCNC: 9.1 G/DL (ref 11.5–15.4)
HGB BLDA-MCNC: 8.8 G/DL (ref 11.5–15.4)
INR PPP: 1.14 (ref 0.86–1.16)
LACTATE SERPL-SCNC: 1.4 MMOL/L (ref 0.5–2)
LYMPHOCYTES # BLD AUTO: 0.33 THOUSAND/UL (ref 0.6–4.47)
LYMPHOCYTES # BLD AUTO: 2 % (ref 14–44)
MAGNESIUM SERPL-MCNC: 1.6 MG/DL (ref 1.6–2.6)
MCH RBC QN AUTO: 28.6 PG (ref 26.8–34.3)
MCH RBC QN AUTO: 29 PG (ref 26.8–34.3)
MCH RBC QN AUTO: 30.2 PG (ref 26.8–34.3)
MCHC RBC AUTO-ENTMCNC: 32.9 G/DL (ref 31.4–37.4)
MCHC RBC AUTO-ENTMCNC: 33.6 G/DL (ref 31.4–37.4)
MCHC RBC AUTO-ENTMCNC: 34.7 G/DL (ref 31.4–37.4)
MCV RBC AUTO: 86 FL (ref 82–98)
MCV RBC AUTO: 87 FL (ref 82–98)
MCV RBC AUTO: 87 FL (ref 82–98)
METAMYELOCYTES NFR BLD MANUAL: 1 % (ref 0–1)
MONOCYTES # BLD AUTO: 0.33 THOUSAND/UL (ref 0–1.22)
MONOCYTES NFR BLD: 2 % (ref 4–12)
NEUTROPHILS # BLD MANUAL: 15.75 THOUSAND/UL (ref 1.85–7.62)
NEUTS BAND NFR BLD MANUAL: 1 % (ref 0–8)
NEUTS SEG NFR BLD AUTO: 94 % (ref 43–75)
NRBC BLD AUTO-RTO: 0 /100 WBCS
P AXIS: 56 DEGREES
PCO2 BLD: 23 MMOL/L (ref 21–32)
PCO2 BLD: 44.6 MM HG (ref 42–50)
PH BLD: 7.3 [PH] (ref 7.3–7.4)
PLATELET # BLD AUTO: 192 THOUSANDS/UL (ref 149–390)
PLATELET # BLD AUTO: 371 THOUSANDS/UL (ref 149–390)
PLATELET # BLD AUTO: 394 THOUSANDS/UL (ref 149–390)
PLATELET BLD QL SMEAR: ADEQUATE
PMV BLD AUTO: 10.2 FL (ref 8.9–12.7)
PMV BLD AUTO: 10.5 FL (ref 8.9–12.7)
PMV BLD AUTO: 9.9 FL (ref 8.9–12.7)
PO2 BLD: 30 MM HG (ref 35–45)
POIKILOCYTOSIS BLD QL SMEAR: PRESENT
POTASSIUM BLD-SCNC: 4.2 MMOL/L (ref 3.5–5.3)
POTASSIUM SERPL-SCNC: 4.1 MMOL/L (ref 3.5–5.3)
POTASSIUM SERPL-SCNC: 4.1 MMOL/L (ref 3.5–5.3)
PR INTERVAL: 144 MS
PROT SERPL-MCNC: 5.5 G/DL (ref 6.4–8.2)
PROTHROMBIN TIME: 14.6 SECONDS (ref 12.1–14.4)
QRS AXIS: -30 DEGREES
QRSD INTERVAL: 84 MS
QT INTERVAL: 396 MS
QTC INTERVAL: 465 MS
RBC # BLD AUTO: 2.62 MILLION/UL (ref 3.81–5.12)
RBC # BLD AUTO: 3.14 MILLION/UL (ref 3.81–5.12)
RBC # BLD AUTO: 4.31 MILLION/UL (ref 3.81–5.12)
RBC MORPH BLD: PRESENT
RH BLD: POSITIVE
SAO2 % BLD FROM PO2: 50 % (ref 95–98)
SODIUM BLD-SCNC: 138 MMOL/L (ref 136–145)
SODIUM SERPL-SCNC: 137 MMOL/L (ref 136–145)
SODIUM SERPL-SCNC: 138 MMOL/L (ref 136–145)
SPECIMEN EXPIRATION DATE: NORMAL
SPECIMEN SOURCE: ABNORMAL
T WAVE AXIS: 15 DEGREES
VENTRICULAR RATE: 83 BPM
WBC # BLD AUTO: 16.58 THOUSAND/UL (ref 4.31–10.16)
WBC # BLD AUTO: 18.91 THOUSAND/UL (ref 4.31–10.16)
WBC # BLD AUTO: 26.55 THOUSAND/UL (ref 4.31–10.16)

## 2017-12-19 PROCEDURE — 86901 BLOOD TYPING SEROLOGIC RH(D): CPT | Performed by: OBSTETRICS & GYNECOLOGY

## 2017-12-19 PROCEDURE — 0UT9FZL RESECTION OF UTERUS, SUPRACERVICAL, VIA NATURAL OR ARTIFICIAL OPENING WITH PERCUTANEOUS ENDOSCOPIC ASSISTANCE: ICD-10-PCS | Performed by: OBSTETRICS & GYNECOLOGY

## 2017-12-19 PROCEDURE — 85007 BL SMEAR W/DIFF WBC COUNT: CPT | Performed by: OBSTETRICS & GYNECOLOGY

## 2017-12-19 PROCEDURE — 85014 HEMATOCRIT: CPT

## 2017-12-19 PROCEDURE — 0WCJ4ZZ EXTIRPATION OF MATTER FROM PELVIC CAVITY, PERCUTANEOUS ENDOSCOPIC APPROACH: ICD-10-PCS | Performed by: OBSTETRICS & GYNECOLOGY

## 2017-12-19 PROCEDURE — 0UT2FZZ RESECTION OF BILATERAL OVARIES, VIA NATURAL OR ARTIFICIAL OPENING WITH PERCUTANEOUS ENDOSCOPIC ASSISTANCE: ICD-10-PCS | Performed by: OBSTETRICS & GYNECOLOGY

## 2017-12-19 PROCEDURE — 80053 COMPREHEN METABOLIC PANEL: CPT | Performed by: NURSE PRACTITIONER

## 2017-12-19 PROCEDURE — 93005 ELECTROCARDIOGRAM TRACING: CPT

## 2017-12-19 PROCEDURE — 8E0W4CZ ROBOTIC ASSISTED PROCEDURE OF TRUNK REGION, PERCUTANEOUS ENDOSCOPIC APPROACH: ICD-10-PCS | Performed by: OBSTETRICS & GYNECOLOGY

## 2017-12-19 PROCEDURE — 88309 TISSUE EXAM BY PATHOLOGIST: CPT | Performed by: OBSTETRICS & GYNECOLOGY

## 2017-12-19 PROCEDURE — 88341 IMHCHEM/IMCYTCHM EA ADD ANTB: CPT | Performed by: OBSTETRICS & GYNECOLOGY

## 2017-12-19 PROCEDURE — 0UT7FZZ RESECTION OF BILATERAL FALLOPIAN TUBES, VIA NATURAL OR ARTIFICIAL OPENING WITH PERCUTANEOUS ENDOSCOPIC ASSISTANCE: ICD-10-PCS | Performed by: OBSTETRICS & GYNECOLOGY

## 2017-12-19 PROCEDURE — 88307 TISSUE EXAM BY PATHOLOGIST: CPT | Performed by: OBSTETRICS & GYNECOLOGY

## 2017-12-19 PROCEDURE — 85027 COMPLETE CBC AUTOMATED: CPT | Performed by: OBSTETRICS & GYNECOLOGY

## 2017-12-19 PROCEDURE — 88333 PATH CONSLTJ SURG CYTO XM 1: CPT | Performed by: OBSTETRICS & GYNECOLOGY

## 2017-12-19 PROCEDURE — P9021 RED BLOOD CELLS UNIT: HCPCS

## 2017-12-19 PROCEDURE — 86850 RBC ANTIBODY SCREEN: CPT | Performed by: OBSTETRICS & GYNECOLOGY

## 2017-12-19 PROCEDURE — 88342 IMHCHEM/IMCYTCHM 1ST ANTB: CPT | Performed by: OBSTETRICS & GYNECOLOGY

## 2017-12-19 PROCEDURE — 83735 ASSAY OF MAGNESIUM: CPT | Performed by: NURSE PRACTITIONER

## 2017-12-19 PROCEDURE — 07BC4ZZ EXCISION OF PELVIS LYMPHATIC, PERCUTANEOUS ENDOSCOPIC APPROACH: ICD-10-PCS | Performed by: OBSTETRICS & GYNECOLOGY

## 2017-12-19 PROCEDURE — 86920 COMPATIBILITY TEST SPIN: CPT

## 2017-12-19 PROCEDURE — 88334 PATH CONSLTJ SURG CYTO XM EA: CPT | Performed by: OBSTETRICS & GYNECOLOGY

## 2017-12-19 PROCEDURE — 85027 COMPLETE CBC AUTOMATED: CPT | Performed by: NURSE PRACTITIONER

## 2017-12-19 PROCEDURE — 86900 BLOOD TYPING SEROLOGIC ABO: CPT | Performed by: OBSTETRICS & GYNECOLOGY

## 2017-12-19 PROCEDURE — 88112 CYTOPATH CELL ENHANCE TECH: CPT | Performed by: OBSTETRICS & GYNECOLOGY

## 2017-12-19 PROCEDURE — 82947 ASSAY GLUCOSE BLOOD QUANT: CPT

## 2017-12-19 PROCEDURE — 82948 REAGENT STRIP/BLOOD GLUCOSE: CPT

## 2017-12-19 PROCEDURE — 83605 ASSAY OF LACTIC ACID: CPT | Performed by: SURGERY

## 2017-12-19 PROCEDURE — 80048 BASIC METABOLIC PNL TOTAL CA: CPT | Performed by: SURGERY

## 2017-12-19 PROCEDURE — 82803 BLOOD GASES ANY COMBINATION: CPT

## 2017-12-19 PROCEDURE — 84132 ASSAY OF SERUM POTASSIUM: CPT

## 2017-12-19 PROCEDURE — 85610 PROTHROMBIN TIME: CPT | Performed by: SURGERY

## 2017-12-19 PROCEDURE — 82330 ASSAY OF CALCIUM: CPT

## 2017-12-19 PROCEDURE — 84295 ASSAY OF SERUM SODIUM: CPT

## 2017-12-19 PROCEDURE — 88363 XM ARCHIVE TISSUE MOLEC ANAL: CPT | Performed by: PATHOLOGY

## 2017-12-19 RX ORDER — OXYCODONE HYDROCHLORIDE AND ACETAMINOPHEN 5; 325 MG/1; MG/1
1 TABLET ORAL EVERY 4 HOURS PRN
Status: DISCONTINUED | OUTPATIENT
Start: 2017-12-19 | End: 2017-12-20

## 2017-12-19 RX ORDER — SUCCINYLCHOLINE CHLORIDE 20 MG/ML
INJECTION INTRAMUSCULAR; INTRAVENOUS AS NEEDED
Status: DISCONTINUED | OUTPATIENT
Start: 2017-12-19 | End: 2017-12-19 | Stop reason: SURG

## 2017-12-19 RX ORDER — MEPERIDINE HYDROCHLORIDE 25 MG/ML
12.5 INJECTION INTRAMUSCULAR; INTRAVENOUS; SUBCUTANEOUS AS NEEDED
Status: DISCONTINUED | OUTPATIENT
Start: 2017-12-19 | End: 2017-12-19 | Stop reason: HOSPADM

## 2017-12-19 RX ORDER — GEMFIBROZIL 600 MG/1
600 TABLET, FILM COATED ORAL
Status: DISCONTINUED | OUTPATIENT
Start: 2017-12-20 | End: 2017-12-21 | Stop reason: HOSPADM

## 2017-12-19 RX ORDER — ALBUTEROL SULFATE 2.5 MG/3ML
2.5 SOLUTION RESPIRATORY (INHALATION) ONCE AS NEEDED
Status: DISCONTINUED | OUTPATIENT
Start: 2017-12-19 | End: 2017-12-19 | Stop reason: HOSPADM

## 2017-12-19 RX ORDER — HYDROMORPHONE HYDROCHLORIDE 2 MG/ML
INJECTION, SOLUTION INTRAMUSCULAR; INTRAVENOUS; SUBCUTANEOUS AS NEEDED
Status: DISCONTINUED | OUTPATIENT
Start: 2017-12-19 | End: 2017-12-19 | Stop reason: SURG

## 2017-12-19 RX ORDER — METOCLOPRAMIDE HYDROCHLORIDE 5 MG/ML
10 INJECTION INTRAMUSCULAR; INTRAVENOUS ONCE AS NEEDED
Status: DISCONTINUED | OUTPATIENT
Start: 2017-12-19 | End: 2017-12-19 | Stop reason: HOSPADM

## 2017-12-19 RX ORDER — FENTANYL CITRATE 50 UG/ML
INJECTION, SOLUTION INTRAMUSCULAR; INTRAVENOUS AS NEEDED
Status: DISCONTINUED | OUTPATIENT
Start: 2017-12-19 | End: 2017-12-19 | Stop reason: SURG

## 2017-12-19 RX ORDER — SODIUM CHLORIDE, SODIUM LACTATE, POTASSIUM CHLORIDE, CALCIUM CHLORIDE 600; 310; 30; 20 MG/100ML; MG/100ML; MG/100ML; MG/100ML
125 INJECTION, SOLUTION INTRAVENOUS CONTINUOUS
Status: DISCONTINUED | OUTPATIENT
Start: 2017-12-19 | End: 2017-12-19

## 2017-12-19 RX ORDER — EPHEDRINE SULFATE 50 MG/ML
INJECTION, SOLUTION INTRAVENOUS AS NEEDED
Status: DISCONTINUED | OUTPATIENT
Start: 2017-12-19 | End: 2017-12-19 | Stop reason: SURG

## 2017-12-19 RX ORDER — ONDANSETRON 2 MG/ML
INJECTION INTRAMUSCULAR; INTRAVENOUS AS NEEDED
Status: DISCONTINUED | OUTPATIENT
Start: 2017-12-19 | End: 2017-12-19 | Stop reason: SURG

## 2017-12-19 RX ORDER — SODIUM CHLORIDE 9 MG/ML
INJECTION, SOLUTION INTRAVENOUS CONTINUOUS PRN
Status: DISCONTINUED | OUTPATIENT
Start: 2017-12-19 | End: 2017-12-19 | Stop reason: SURG

## 2017-12-19 RX ORDER — CEFAZOLIN SODIUM 1 G/3ML
INJECTION, POWDER, FOR SOLUTION INTRAMUSCULAR; INTRAVENOUS AS NEEDED
Status: DISCONTINUED | OUTPATIENT
Start: 2017-12-19 | End: 2017-12-19 | Stop reason: SURG

## 2017-12-19 RX ORDER — PROPOFOL 10 MG/ML
INJECTION, EMULSION INTRAVENOUS AS NEEDED
Status: DISCONTINUED | OUTPATIENT
Start: 2017-12-19 | End: 2017-12-19 | Stop reason: SURG

## 2017-12-19 RX ORDER — ROCURONIUM BROMIDE 10 MG/ML
INJECTION, SOLUTION INTRAVENOUS AS NEEDED
Status: DISCONTINUED | OUTPATIENT
Start: 2017-12-19 | End: 2017-12-19 | Stop reason: SURG

## 2017-12-19 RX ORDER — LIDOCAINE HYDROCHLORIDE 10 MG/ML
INJECTION, SOLUTION INFILTRATION; PERINEURAL AS NEEDED
Status: DISCONTINUED | OUTPATIENT
Start: 2017-12-19 | End: 2017-12-19 | Stop reason: SURG

## 2017-12-19 RX ORDER — MAGNESIUM HYDROXIDE 1200 MG/15ML
LIQUID ORAL AS NEEDED
Status: DISCONTINUED | OUTPATIENT
Start: 2017-12-19 | End: 2017-12-19 | Stop reason: HOSPADM

## 2017-12-19 RX ORDER — ARIPIPRAZOLE 10 MG/1
10 TABLET ORAL DAILY
Status: DISCONTINUED | OUTPATIENT
Start: 2017-12-20 | End: 2017-12-21 | Stop reason: HOSPADM

## 2017-12-19 RX ORDER — MIDAZOLAM HYDROCHLORIDE 1 MG/ML
INJECTION INTRAMUSCULAR; INTRAVENOUS AS NEEDED
Status: DISCONTINUED | OUTPATIENT
Start: 2017-12-19 | End: 2017-12-19 | Stop reason: SURG

## 2017-12-19 RX ORDER — SODIUM CHLORIDE, SODIUM LACTATE, POTASSIUM CHLORIDE, CALCIUM CHLORIDE 600; 310; 30; 20 MG/100ML; MG/100ML; MG/100ML; MG/100ML
INJECTION, SOLUTION INTRAVENOUS CONTINUOUS PRN
Status: DISCONTINUED | OUTPATIENT
Start: 2017-12-19 | End: 2017-12-19 | Stop reason: SURG

## 2017-12-19 RX ORDER — ONDANSETRON 2 MG/ML
4 INJECTION INTRAMUSCULAR; INTRAVENOUS ONCE AS NEEDED
Status: DISCONTINUED | OUTPATIENT
Start: 2017-12-19 | End: 2017-12-19

## 2017-12-19 RX ORDER — SODIUM CHLORIDE 9 MG/ML
100 INJECTION, SOLUTION INTRAVENOUS CONTINUOUS
Status: DISCONTINUED | OUTPATIENT
Start: 2017-12-19 | End: 2017-12-20

## 2017-12-19 RX ORDER — INDOCYANINE GREEN AND WATER 25 MG
KIT INJECTION AS NEEDED
Status: DISCONTINUED | OUTPATIENT
Start: 2017-12-19 | End: 2017-12-19 | Stop reason: HOSPADM

## 2017-12-19 RX ORDER — GLYCOPYRROLATE 0.2 MG/ML
INJECTION INTRAMUSCULAR; INTRAVENOUS AS NEEDED
Status: DISCONTINUED | OUTPATIENT
Start: 2017-12-19 | End: 2017-12-19 | Stop reason: SURG

## 2017-12-19 RX ORDER — ROPIVACAINE HYDROCHLORIDE 2 MG/ML
INJECTION, SOLUTION EPIDURAL; INFILTRATION; PERINEURAL AS NEEDED
Status: DISCONTINUED | OUTPATIENT
Start: 2017-12-19 | End: 2017-12-19 | Stop reason: SURG

## 2017-12-19 RX ORDER — ONDANSETRON 2 MG/ML
4 INJECTION INTRAMUSCULAR; INTRAVENOUS EVERY 6 HOURS PRN
Status: DISCONTINUED | OUTPATIENT
Start: 2017-12-19 | End: 2017-12-21 | Stop reason: HOSPADM

## 2017-12-19 RX ORDER — ROPIVACAINE HYDROCHLORIDE 5 MG/ML
INJECTION, SOLUTION EPIDURAL; INFILTRATION; PERINEURAL AS NEEDED
Status: DISCONTINUED | OUTPATIENT
Start: 2017-12-19 | End: 2017-12-19 | Stop reason: SURG

## 2017-12-19 RX ORDER — SODIUM CHLORIDE, SODIUM LACTATE, POTASSIUM CHLORIDE, CALCIUM CHLORIDE 600; 310; 30; 20 MG/100ML; MG/100ML; MG/100ML; MG/100ML
100 INJECTION, SOLUTION INTRAVENOUS CONTINUOUS
Status: DISCONTINUED | OUTPATIENT
Start: 2017-12-19 | End: 2017-12-19

## 2017-12-19 RX ORDER — FENTANYL CITRATE/PF 50 MCG/ML
25 SYRINGE (ML) INJECTION
Status: DISCONTINUED | OUTPATIENT
Start: 2017-12-19 | End: 2017-12-19 | Stop reason: HOSPADM

## 2017-12-19 RX ORDER — VENLAFAXINE 37.5 MG/1
75 TABLET ORAL 2 TIMES DAILY
Status: DISCONTINUED | OUTPATIENT
Start: 2017-12-19 | End: 2017-12-21 | Stop reason: HOSPADM

## 2017-12-19 RX ORDER — OXYCODONE HYDROCHLORIDE AND ACETAMINOPHEN 5; 325 MG/1; MG/1
2 TABLET ORAL EVERY 4 HOURS PRN
Status: DISCONTINUED | OUTPATIENT
Start: 2017-12-19 | End: 2017-12-20

## 2017-12-19 RX ORDER — IBUPROFEN 600 MG/1
600 TABLET ORAL EVERY 6 HOURS PRN
Status: DISCONTINUED | OUTPATIENT
Start: 2017-12-19 | End: 2017-12-19

## 2017-12-19 RX ORDER — BUPIVACAINE HYDROCHLORIDE 2.5 MG/ML
INJECTION, SOLUTION INFILTRATION; PERINEURAL AS NEEDED
Status: DISCONTINUED | OUTPATIENT
Start: 2017-12-19 | End: 2017-12-19 | Stop reason: HOSPADM

## 2017-12-19 RX ORDER — LABETALOL HYDROCHLORIDE 5 MG/ML
5 INJECTION, SOLUTION INTRAVENOUS
Status: DISCONTINUED | OUTPATIENT
Start: 2017-12-19 | End: 2017-12-19 | Stop reason: HOSPADM

## 2017-12-19 RX ADMIN — ROPIVACAINE HYDROCHLORIDE 20 ML: 5 INJECTION, SOLUTION EPIDURAL; INFILTRATION; PERINEURAL at 11:27

## 2017-12-19 RX ADMIN — FENTANYL CITRATE 50 MCG: 50 INJECTION, SOLUTION INTRAMUSCULAR; INTRAVENOUS at 19:48

## 2017-12-19 RX ADMIN — SODIUM CHLORIDE, SODIUM LACTATE, POTASSIUM CHLORIDE, AND CALCIUM CHLORIDE: .6; .31; .03; .02 INJECTION, SOLUTION INTRAVENOUS at 07:45

## 2017-12-19 RX ADMIN — SODIUM CHLORIDE, SODIUM LACTATE, POTASSIUM CHLORIDE, AND CALCIUM CHLORIDE: .6; .31; .03; .02 INJECTION, SOLUTION INTRAVENOUS at 20:39

## 2017-12-19 RX ADMIN — ROCURONIUM BROMIDE 20 MG: 10 INJECTION INTRAVENOUS at 20:33

## 2017-12-19 RX ADMIN — LIDOCAINE HYDROCHLORIDE 50 MG: 10 INJECTION, SOLUTION INFILTRATION; PERINEURAL at 08:41

## 2017-12-19 RX ADMIN — SODIUM CHLORIDE: 0.9 INJECTION, SOLUTION INTRAVENOUS at 08:45

## 2017-12-19 RX ADMIN — GLYCOPYRROLATE 0.3 MG: 0.2 INJECTION, SOLUTION INTRAMUSCULAR; INTRAVENOUS at 21:10

## 2017-12-19 RX ADMIN — PROPOFOL 150 MG: 10 INJECTION, EMULSION INTRAVENOUS at 19:48

## 2017-12-19 RX ADMIN — VASOPRESSIN 2 UNITS: 20 INJECTION, SOLUTION INTRAMUSCULAR; SUBCUTANEOUS at 19:59

## 2017-12-19 RX ADMIN — SODIUM CHLORIDE, SODIUM LACTATE, POTASSIUM CHLORIDE, AND CALCIUM CHLORIDE 125 ML/HR: .6; .31; .03; .02 INJECTION, SOLUTION INTRAVENOUS at 14:46

## 2017-12-19 RX ADMIN — HYDROMORPHONE HYDROCHLORIDE 0.5 MG: 2 INJECTION, SOLUTION INTRAMUSCULAR; INTRAVENOUS; SUBCUTANEOUS at 09:58

## 2017-12-19 RX ADMIN — SODIUM CHLORIDE 100 ML/HR: 0.9 INJECTION, SOLUTION INTRAVENOUS at 22:32

## 2017-12-19 RX ADMIN — MIDAZOLAM HYDROCHLORIDE 2 MG: 1 INJECTION, SOLUTION INTRAMUSCULAR; INTRAVENOUS at 08:35

## 2017-12-19 RX ADMIN — PROPOFOL 150 MG: 10 INJECTION, EMULSION INTRAVENOUS at 08:41

## 2017-12-19 RX ADMIN — SODIUM CHLORIDE, SODIUM LACTATE, POTASSIUM CHLORIDE, AND CALCIUM CHLORIDE 125 ML/HR: .6; .31; .03; .02 INJECTION, SOLUTION INTRAVENOUS at 15:47

## 2017-12-19 RX ADMIN — OXYCODONE HYDROCHLORIDE AND ACETAMINOPHEN 1 TABLET: 5; 325 TABLET ORAL at 13:15

## 2017-12-19 RX ADMIN — EPHEDRINE SULFATE 5 MG: 50 INJECTION, SOLUTION INTRAMUSCULAR; INTRAVENOUS; SUBCUTANEOUS at 09:17

## 2017-12-19 RX ADMIN — DEXAMETHASONE SODIUM PHOSPHATE 10 MG: 10 INJECTION INTRAMUSCULAR; INTRAVENOUS at 09:10

## 2017-12-19 RX ADMIN — NEOSTIGMINE METHYLSULFATE 5 MG: 1 INJECTION, SOLUTION INTRAMUSCULAR; INTRAVENOUS; SUBCUTANEOUS at 11:32

## 2017-12-19 RX ADMIN — GLYCOPYRROLATE 0.8 MG: 0.2 INJECTION, SOLUTION INTRAMUSCULAR; INTRAVENOUS at 11:32

## 2017-12-19 RX ADMIN — SODIUM CHLORIDE, SODIUM LACTATE, POTASSIUM CHLORIDE, AND CALCIUM CHLORIDE: .6; .31; .03; .02 INJECTION, SOLUTION INTRAVENOUS at 19:41

## 2017-12-19 RX ADMIN — FENTANYL CITRATE 100 MCG: 50 INJECTION, SOLUTION INTRAMUSCULAR; INTRAVENOUS at 08:41

## 2017-12-19 RX ADMIN — SUCCINYLCHOLINE CHLORIDE 120 MG: 20 INJECTION, SOLUTION INTRAMUSCULAR; INTRAVENOUS at 19:48

## 2017-12-19 RX ADMIN — CEFAZOLIN 2000 MG: 1 INJECTION, POWDER, FOR SOLUTION INTRAVENOUS at 20:15

## 2017-12-19 RX ADMIN — ONDANSETRON 4 MG: 2 INJECTION INTRAMUSCULAR; INTRAVENOUS at 08:35

## 2017-12-19 RX ADMIN — ROCURONIUM BROMIDE 20 MG: 10 INJECTION INTRAVENOUS at 09:58

## 2017-12-19 RX ADMIN — PROPOFOL 50 MG: 10 INJECTION, EMULSION INTRAVENOUS at 08:42

## 2017-12-19 RX ADMIN — IBUPROFEN 600 MG: 600 TABLET, FILM COATED ORAL at 13:15

## 2017-12-19 RX ADMIN — EPHEDRINE SULFATE 10 MG: 50 INJECTION, SOLUTION INTRAMUSCULAR; INTRAVENOUS; SUBCUTANEOUS at 19:55

## 2017-12-19 RX ADMIN — ROCURONIUM BROMIDE 50 MG: 10 INJECTION INTRAVENOUS at 08:41

## 2017-12-19 RX ADMIN — HYDROMORPHONE HYDROCHLORIDE 0.5 MG: 2 INJECTION, SOLUTION INTRAMUSCULAR; INTRAVENOUS; SUBCUTANEOUS at 10:52

## 2017-12-19 RX ADMIN — SODIUM CHLORIDE 100 ML/HR: 0.9 INJECTION, SOLUTION INTRAVENOUS at 16:50

## 2017-12-19 RX ADMIN — NEOSTIGMINE METHYLSULFATE 1.5 MG: 1 INJECTION, SOLUTION INTRAMUSCULAR; INTRAVENOUS; SUBCUTANEOUS at 21:10

## 2017-12-19 RX ADMIN — SODIUM CHLORIDE: 0.9 INJECTION, SOLUTION INTRAVENOUS at 19:41

## 2017-12-19 RX ADMIN — ONDANSETRON 4 MG: 2 INJECTION INTRAMUSCULAR; INTRAVENOUS at 21:10

## 2017-12-19 RX ADMIN — ROPIVACAINE HYDROCHLORIDE 20 ML: 2 INJECTION, SOLUTION EPIDURAL; INFILTRATION at 11:27

## 2017-12-19 RX ADMIN — ROCURONIUM BROMIDE 20 MG: 10 INJECTION INTRAVENOUS at 09:10

## 2017-12-19 RX ADMIN — CEFAZOLIN SODIUM 2000 MG: 2 SOLUTION INTRAVENOUS at 08:59

## 2017-12-19 NOTE — PERIOPERATIVE NURSING NOTE
Last B/P 73/52 heart rate 92 Kimberly Bains aware  OK to transfer per Kimberly  Report called to Lo RN  Charge nurse on P8 aware

## 2017-12-19 NOTE — PERIOPERATIVE NURSING NOTE
Rapid response called  Pt is hypotensive and clammy stating she feels foggy  Kimberly Bains and [employer] aware of symptoms  Pt is lying flat  Manual blood pressure taken  EKG done, Bloodwork drawn, O2 applied  2 Liters of LR running wide open  No vaginal bleeding, umbilicus site oozing subsided  Abdomen soft and tender

## 2017-12-19 NOTE — RAPID RESPONSE
Progress Note - Rapid Response   Maylin Melendez 63 y o  female MRN: [id number]    Time Called ( Time): 0203  Room#: CW2  bay/room 9  Arrival Time ( Time): 7117  Event End Time ( Time): 1540  MEWS score at time of Rapid Response:   Primary reason for call: Acute change in SBP  Interventions:  Airway/Breathing:  No Intervention on 2L NC  Circulation: IV Fluid Bolus  Other Treatments: labs: istat       Assessment:   1  Hypotensive POD# 0 s/p Hysterectomy, b/l salpingoopherectomy and lymph node dissection    Plan:   · Given 2 L IVFs with good response (SBP 92), she had received 2 liters prior to RRT (So 4 liters total post opt)  · Stat labs: CBC, CMP, Mg, trop  Istat with hgb of 8 8, no prior CBC on record  · EkG with no ST changes, SR  · Plan to admit for observation  HPI/Chief Complaint (Background/Situation): Dinorah Lind is a 63y o  year old female who is here for an elective hysterectomy/ b/l salpingoopherectomy and lymph node dissection secondary to malignant neoplasm of endometrium  Post opt pt hypotensive with SBP 62 and c/o some dizziness  Historical Information   Past Medical History:   Diagnosis Date    Anemia     Depression     Diabetes mellitus ([address] )     Hyperlipidemia     Hypertension      Past Surgical History:   Procedure Laterality Date    ABDOMINAL SURGERY      GASTRIC BYPASS    COLONOSCOPY      TONSILLECTOMY       Social History   History   Alcohol Use No     History   Drug Use No     History   Smoking Status    Never Smoker   Smokeless Tobacco    Never Used     Family History: non-contributory    Meds/Allergies          lactated ringers 125 mL/hr Last Rate: Stopped (12/19/17 1145)   lactated ringers 125 mL/hr    lactated ringers 125 mL/hr Last Rate: 125 mL/hr (12/19/17 1446)       No Known Allergies    ROS: Negative except Dizziness    Physical Exam:  Gen: Appears stated age, not in distress, laying flat with IVFs running     HEENT:AT, NC  Neck:Supple, normal ROM  Chest:CTA B/L, on 2 L NC with SPO2 99%  Cor:RRR, no M/R/G  Abd:abd soft  Ext:warm, no edema, no deformity  Neuro:AAOx3, Kong  Skin:Pale, warm,       Intake/Output Summary (Last 24 hours) at 12/19/17 1532  Last data filed at 12/19/17 1254   Gross per 24 hour   Intake             1750 ml   Output              500 ml   Net             1250 ml       Respiratory    Lab Data (Last 4 hours)    None         O2/Vent Data (Last 4 hours)    None              Invasive Devices     Peripheral Intravenous Line            Peripheral IV 12/19/17 Left Antecubital less than 1 day    Peripheral IV 12/19/17 Left Hand less than 1 day                DIAGNOSTIC DATA:    Lab: I have personally reviewed pertinent lab results  CBC:       CMP:         Invalid input(s): LABALBU  PT/INR:   No results found for: PT, INR,   Magnesium: No components found for: MAG,   Phosphorous: No results found for: PHOS    Microbiology:  Lab Results   Component Value Date    URINECX No Growth <1000 cfu/mL 10/24/2017         OUTCOME:   Stayed in room   Family notified of transfer: no  Family member contacted:   Code Status: No Order  Critical Care Time: Total Critical Care time spent 15 minutes excluding procedures, teaching and family updates

## 2017-12-19 NOTE — ANESTHESIA PROCEDURE NOTES
Arterial Line Insertion  Date/Time: 12/19/2017 8:49 AM  Performed by: Reno Chen  Authorized by: Dorothy Reynoso   Consent: Verbal consent obtained  Written consent obtained  Risks and benefits: risks, benefits and alternatives were discussed  Consent given by: patient  Patient understanding: patient states understanding of the procedure being performed  Patient consent: the patient's understanding of the procedure matches consent given  Procedure consent: procedure consent matches procedure scheduled  Relevant documents: relevant documents present and verified  Test results: test results available and properly labeled  Site marked: the operative site was marked  Imaging studies: imaging studies available  Required items: required blood products, implants, devices, and special equipment available  Patient identity confirmed: verbally with patient, arm band, provided demographic data and hospital-assigned identification number  Time out: Immediately prior to procedure a "time out" was called to verify the correct patient, procedure, equipment, support staff and site/side marked as required  Preparation: Patient was prepped and draped in the usual sterile fashion  Indications: hemodynamic monitoring  Orientation:  LeftLocation: radial artery    Sedation:  Patient sedated: ga    Yimi's test normal: yes  Needle gauge: 20  Seldinger technique: Seldinger technique used  Number of attempts: 1  Post-procedure: dressing applied  Comments: STERILE INSERTION OF MAURICIO BY DR Cyndi Martinez

## 2017-12-19 NOTE — PROGRESS NOTES
Subjective:  Patient transferred to med-surg floor  Upon transfer, patient felt 'funny' again, unable to describe what that means  Denied otherwise dizziness, lightheadedness, chest palpitations  Shortlasting, but now feeling fatigued  Denies abdominal pain  Nursing obtained BP of 60/40's, pulse 95, 99% 2L O2      Vitals:    12/19/17 1725   BP: (!) 70/52   Pulse: 102   Resp: 22   Temp:    SpO2:      Physical exam:  NAD, not diaphoretic, pale  No increased work of breathing  RRR, not tachycardiac  Abdomen soft nontender to palpation, incision c/d/i  Ext +1 edema, moving all extremities appropriately    A/P:  -Will obtain stat CBC  -prepare 4U pRBC's  -vitals q15min , IVF resuscitation - will give fluid bolus  -discussed case with attending, plan currently evolving

## 2017-12-19 NOTE — ANESTHESIA PREPROCEDURE EVALUATION
Review of Systems/Medical History  Patient summary reviewed    No history of anesthetic complications     Cardiovascular  Negative cardio ROS Exercise tolerance: good,  Hyperlipidemia, Hypertension controlled,    Pulmonary  Negative pulmonary ROS ,        GI/Hepatic  Negative GI/hepatic ROS          Negative  ROS        Endo/Other  Negative endo/other ROS Diabetes well controlled type 2 ,      GYN  Negative gynecology ROS          Hematology  Negative hematology ROS Anemia ,     Musculoskeletal  Negative musculoskeletal ROS        Neurology  Negative neurology ROS      Psychology   Negative psychology ROS            Physical Exam    Airway    Mallampati score: II  TM Distance: >3 FB  Neck ROM: full     Dental   No notable dental hx     Cardiovascular  Comment: Negative ROS,     Pulmonary      Other Findings        Anesthesia Plan  ASA Score- 3       Anesthesia Type- general with ASA Monitors  Additional Monitors: arterial line  Airway Plan: ETT  Comment: Patient seen and examined  History reviewed  Patient to be done under general anesthesia with ETT and routine monitors  Georgie  Risks discussed with the patient  Consent obtained          Induction- intravenous  Informed Consent- Anesthetic plan and risks discussed with patient  I personally reviewed this patient with the CRNA  Discussed and agreed on the Anesthesia Plan with the CRNA  Mayito Watts

## 2017-12-19 NOTE — PERIOPERATIVE NURSING NOTE
Gracia Bains aware of hypotension  Her to see pt  at this time  She put more hystocryl on the umbilicus port site and 4x4 gauze reinforcement for some oozing  No evidence of vaginal drainage  Pt states she feels fine maybe a bit dizzy but nothing notable  Will watch for new orders

## 2017-12-19 NOTE — OP NOTE
OPERATIVE REPORT  PATIENT NAME: Shena Castillo    :  1955  MRN: [id number]  Pt Location: [facility] OR ROOM 14    SURGERY DATE: 2017    Surgeon(s) and Role:     * Kimberly Bains - Assisting     * Beronica Pepper DO - Assisting     * Leticia Sloan MD - Primary     * Karen Stewart PA-C - Assisting     * Sam Romano PA-C - Assisting    Preop Diagnosis:  Malignant neoplasm of endometrium ([address] ) [C54 1]    Post-Op Diagnosis Codes:     * Malignant neoplasm of endometrium ([address] ) [C54 1]    Procedure(s) (LRB):  HYSTERECTOMY LAPAROSCOPIC TOTAL ([address]) W/ ROBOTICS; BILATERAL SALPINGOOPHERECTOMY; LYMPH NODE DISSECTION; lysis of adhesions (N/A)    Specimen(s):  ID Type Source Tests Collected by Time Destination   1 :  Washing Pelvic Washing NON-GYNECOLOGIC CYTOLOGY Leticia Sloan MD 2017 0950    2 : Right pelvic lymph nodes-touch prep Tissue Lymph Node, [place] TISSUE EXAM Leticia Sloan MD 2017 1005    3 : Left pelvic lymph nodes-touch prep Tissue Lymph Node, [place] TISSUE EXAM Leticia Sloan MD 2017 1016    4 : cervix, uterus, tubes & ovaries Tissue Uterus w/Bilateral Ovaries and Fallopian Tubes TISSUE EXAM Leticia Sloan MD 2017 1036    5 : right pelvic lymph nodes Tissue Lymph Node, [place] TISSUE EXAM Leticia Sloan MD 2017 1049        Estimated Blood Loss:   100 mL    Drains: None     Anesthesia Type:   General    Operative Indications:  Malignant neoplasm of endometrium (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  She presents for definitive surgical management        Operative Findings:  1) significant adhesions of the omentum to the anterior abdominal wall  2) bilateral adnexae adherent to the pelvic sidewalls  3) grossly normal appearing cervix, uterus, bilateral fallopian tubes and ovaries  4) obvious external iliac bilateral sentinel pelvic lymph nodes (CONFIRMED BY TOUCH-PREP)  5) no suspicious lymph nodes or extra uterine disease    Complications:   None    Procedure and Technique:  Procedure and Technique:  The patient was taken to the operating room where general endotracheal anesthesia was induced without complications  The patient received antibiotic prophylaxis per hospital protocol   Sequential compression devices were applied to the lower extremities and activated prior to induction of anesthesia  The patient was placed in the dorsolithotomy position in [address] [place] [address] and her lower abdomen, perineum and vagina were prepped and draped in the usual sterile fashion  Under direct visualization the uterus was sounded and the endocervix was dilated  ICG dye was injected at the 3:00 and 9 o'clock position per protocol  Juliet Foster uterine manipulator was easily placed   Sifuentes catheter was placed and the intravaginal occluder balloon was inflated         Attention was turned to the abdomen  All port sites were infiltrated with bupivacaine at the beginning of completion of the procedure  A 10 mm skin incision was made approximately 4 cm above the umbilicus near the midline   Then, using a 5 mm Endopath Excel trocar and the 5 mm laparoscope, the peritoneal cavity was entered under direct visualization  Good intraperitoneal location was confirmed and pneumoperitoneum was created to maximal pressure 15 mm of mercury   Three 8 mm robotic trocars were placed (2 on the left and 1 on the right) and an additional 8 mm long trocar was placed in the midline port site for camera use   A short 11 mm trocar was placed in the right flank for assistant's use  The patient was placed in steep Trendelenburg and the EDUSinci system was docked  The above-mentioned findings were noted  The EnSeal device was used to take down omental adhesions from the anterior abdominal wall    The round ligaments were cauterized and divided bilaterally and the bladder was mobilized anteriorly    The peritoneum lateral to the ovarian vessels was divided to both sides, the paravesical and pararectal spaces were developed  The ureters were clearly identified on both sides  Bilateral ovarian vessels were skeletonized, cauterized and divided  Attention was turned to the pelvic sidewalls  Sharon pelvic lymph nodes were identified and removed  Next, the uterine vessels were skeletonized cauterized and divided bilaterally   The cardinal ligaments were serially cauterized and divided on both sides   The bladder was further mobilized anteriorly and a circumferential colpotomy was made   The specimen was easily delivered through the vagina        The vaginal cuff was closed using a running stitch of 2-0 Stratafix   Airtight closure and excellent hemostasis was confirmed   Copious irrigation was used   All areas of dissection were reinspected and hemostasis was confirmed at low intraperitoneal pressures      The robot was undocked   Both 11 mm trocar sites were closed using a deep stitch of zero back with a Nicki Salvia needle device   Pneumoperitoneum was completely released with the assistance of Valsalva maneuvers   The skin at all port sites was closed using a subcuticular stitch of 4-0 Monocryl  Histoacryl was applied to all incisions      The Sifuentes was removed  The patient tolerated the procedure well  Sponge, lap, needle and instrument counts were reported as correct x2   The patient was successfully extubated in the operating room and transferred to the post anesthetic care unit in stable condition      I was present for the entire procedure    Patient Disposition:  PACU , hemodynamically stable and extubated and stable    SIGNATURE: Eve Sanchez MD  DATE: December 19, 2017  TIME: 11:16 AM

## 2017-12-19 NOTE — ANESTHESIA POSTPROCEDURE EVALUATION
Post-Op Assessment Note      CV Status:  Stable    Mental Status:  Alert and awake    Hydration Status:  Euvolemic    PONV Controlled:  Controlled    Airway Patency:  Patent    Post Op Vitals Reviewed: Yes          Staff: CRNA           BP   121/62   Temp   97 8   Pulse  100   Resp   18   SpO2   100

## 2017-12-19 NOTE — DISCHARGE INSTRUCTIONS
Gynecology Discharge Instructions  1  No heavy lifting more than one full gallon of milk (about 8 lbs) for the first few weeks, then increase slowly as tolerated for 8 weeks post op  2  Nothing in the vagina for 6-8 weeks and until cleared by Dr Christine Bear  3  No tub baths or swimming while your incisions are healing  4  You may take stairs one at a time, touching each step with both feet for the first few days, then as tolerated  5  Call the office for fever greater than 100 4, heavy vaginal bleeding or increasing pain  6  Take Colace twice daily to keep your stool soft  7  No Driving until pain free and no longer requiring narcotic pain medications  8  Activity as tolerated    Post Op Prescriptions  You were given Rx for Percocet 5/325mg and Ibuprofen 600mg  You may take the Ibuprofen every 6 hours to relieve pain, especially cramping and mild pain  If you have additional moderate to severe pain you may take 1-2 tabs of percocet every 4-6 hours  You may also take Colace (Docusate Sodium) 100mg 2x daily  This is available over the counter and is recommended to help keep your stool soft in order to prevent straining, especially if you are taking narcotic pain medication  If you have any questions regarding your prescriptions please call your doctor      Robot Assisted Laparoscopic Hysterectomy   WHAT YOU SHOULD KNOW:   Robot-assisted laparoscopic hysterectomy (RH) is surgery to remove your uterus and cervix using a machine controlled by your surgeon  Your ovaries, fallopian tubes, supporting tissues, some lymph nodes, and the top of your vagina may also be removed  After RH, you will not be able to become pregnant  You will go through menopause if your ovaries are removed  AFTER YOU LEAVE:   Medicines:  · Medicines  may be given to decrease pain or prevent a bacterial infection  You may also need to take hormone medicine such as estrogen   Ask your healthcare provider how to take this medicine safely  · Take your medicine as directed  Call your healthcare provider if you think your medicine is not helping or if you have side effects  Tell him if you are allergic to any medicine  Keep a list of the medicines, vitamins, and herbs you take  Include the amounts, and when and why you take them  Bring the list or the pill bottles to follow-up visits  Carry your medicine list with you in case of an emergency  Activity guidelines:   · You may feel like resting more after surgery  Slowly start to do more each day  Rest when you feel it is needed  · Ask when you can start having sexual intercourse again  What to expect after surgery: It is normal to bleed from your vagina after your uterus and cervix are removed  Change the sanitary pad often to prevent infection  Ask your gynecologist or healthcare provider how much bleeding to expect  Contact your healthcare provider if:   · You have a fever  · Your pain is getting worse, even after you take medicine  · Your incisions look red and swollen, or they have bad-smelling drainage coming from them  · You see new or an increased amount of bright red blood coming from your vagina or your incisions  · You have yellow, green, or bad-smelling discharge coming from your vagina  · You feel pain when you urinate or you need to urinate more often than usual     · You have trouble having a bowel movement  · Your skin is itchy, swollen, or has a rash  · You have questions or concerns about your condition or care  Seek care immediately or call 911 if:   · You feel lightheaded, short of breath, and have chest pain  · You cough up blood  · Your arm or leg feels warm, tender, and painful  It may look swollen and red  · You have more bleeding from your vagina than you were told to expect  © 2014 [employer] is for End User's use only and may not be sold, redistributed or otherwise used for commercial purposes   All illustrations and images included in CareNotes® are the copyrighted property of [employer]  or [employer]  The above information is an  only  It is not intended as medical advice for individual conditions or treatments  Talk to your doctor, nurse or pharmacist before following any medical regimen to see if it is safe and effective for you  Salpingo-oophorectomy   WHAT YOU NEED TO KNOW:   A salpingo-oophorectomy is surgery to remove one or both fallopian tubes and ovaries  The ovaries store and release eggs  The fallopian tubes carry the eggs from the ovaries to the uterus  A salpingo-oophorectomy may be done to treat an ectopic pregnancy, infection, or cancer  It may also be done to prevent pregnancy or some types of cancer  DISCHARGE INSTRUCTIONS:   Call 911 for any of the following:   · You feel lightheaded, short of breath, and have chest pain  · You cough up blood  Seek care immediately if:   · Your arm or leg feels warm, tender, and painful  It may look swollen and red  · Blood soaks through your bandage  · Your stitches come apart  Contact your healthcare provider if:   · You have a fever or chills  · Your wound is red, swollen, or draining pus  · You have pus or a foul-smelling odor coming from your vagina  · You have nausea or are vomiting  · Your skin is itchy, swollen, or you have a rash  · You have trouble urinating or having a bowel movement  · You have questions or concerns about your condition or care  Medicines: You may need any of the following:  · Prescription pain medicine  may be given  Ask your healthcare provider how to take this medicine safely  · Hormone medicine  may be given if both ovaries are removed  This medicine will replace hormones in your body that your ovaries produced  You may not be started on this medicine until 2 to 3 months after surgery       · NSAIDs , such as ibuprofen, help decrease swelling, pain, and fever  NSAIDs can cause stomach bleeding or kidney problems in certain people  If you take blood thinner medicine, always ask your healthcare provider if NSAIDs are safe for you  Always read the medicine label and follow directions  · Take your medicine as directed  Contact your healthcare provider if you think your medicine is not helping or if you have side effects  Tell him or her if you are allergic to any medicine  Keep a list of the medicines, vitamins, and herbs you take  Include the amounts, and when and why you take them  Bring the list or the pill bottles to follow-up visits  Carry your medicine list with you in case of an emergency  Care for your wound as directed:  Ask your healthcare provider when your incision can get wet  Carefully wash around the wound with soap and water  Let soap and water run over your incision  Do not  scrub your incision  Dry the area and put on new, clean bandages as directed  Change your bandages when they get wet or dirty  If you have strips of medical tape, let them fall off on their own  Activity:  Ask your healthcare provider when you can return to your normal activities  Do not have sex, douche, or use tampons until your healthcare provider says it is okay  These actions may cause an infection  Do not exercise or lift anything heavy until your healthcare provider says it is okay  This may put too much stress on your incision  Get support: This surgery may be life-changing for you and your family  You will no longer be able to get pregnant if both of your ovaries and fallopian tubes are removed  Sudden changes in the levels of your hormones may occur and cause mood swings and depression  You may feel angry, sad, or frightened, or cry frequently and unexpectedly  These feelings are normal  Talk to your healthcare provider about where you can get support     Follow up with your healthcare provider as directed:  Write down your questions so you remember to ask them during your visits  © 2017 [address] [employer] Information is for End User's use only and may not be sold, redistributed or otherwise used for commercial purposes  All illustrations and images included in CareNotes® are the copyrighted property of [employer]  or [employer]  The above information is an  only  It is not intended as medical advice for individual conditions or treatments  Talk to your doctor, nurse or pharmacist before following any medical regimen to see if it is safe and effective for you

## 2017-12-19 NOTE — PROGRESS NOTES
Post-op Note - OB/GYN   Jennifer Jurist Melendez 63 y o  female MRN: [id number]  Unit/Bed#: OR POOL Encounter: [id number]    Assessment:  63 y o   s/p robotic assisted laparoscopic hysterectomy, bilateral salpingo-oophorectomy, POD#0    Plan:  CBC obtained, pending  Continue fluid resuscitation  Continue to monitor vital signs  Reevaluate if symptoms persist or worsen    Subjective/Objective   Chief Complaint:     63 y o   s/p robotic assisted laparoscopic hysterectomy, bilateral salpingo-oophorectomy, POD#0    Subjective:     Pain: no  Tolerating PO: yes  Voiding: no  Flatus: no  BM: no  Ambulating: no  Chest pain: no  Shortness of breath: no  Leg pain: no    Called to patient holding due to patient hypotension  Blood pressures were reportedly 75/48, then 80/50 manually  Pulse at this time was in the 90s  Upon evaluation, pt appeared comfortable, AaO, NAD  She reported feeling "fine"  Denied dizziness, lightheadedness, palpitations  She endorsed eating a small amount of jello and crackers without nausea or emesis  She had a very small amount of bleeding at her umbilical port site, no vaginal bleeding  At this point, decision was made to proceed with conservative care with plan for discharge to home after discussion with attending physician  Called back to patient holding less than ten minutes later as nursing felt the patient was clammy  Nursing had sat her up, and the patient reported feeling dizzy so they laid her flat on her back  Her blood pressures were then found to be 60s/40s  Nursing called a rapid response  Pt endorses a small amount of dizziness upon arrival, but otherwise reports she's doing "okay, but feeling a little funny"  Objective:     Vitals: Blood pressure (!) 83/43, pulse 102, temperature 98 5 °F (36 9 °C), temperature source Tympanic Core, resp  rate 18, height 4' 11" (1 499 m), weight 102 kg (225 lb), SpO2 92 %      Physical Exam:   Gen: AaOx3, NAD, pleasant  Card: RRR, no m/r/g  Pulm: CTAB  Abd: Soft, nontender, nondistended  Incision is clean, dry, and intact  Histoacryl reapplied at umbilical port site  : Scant vaginal bleeding appreciated on pad      Lab, Imaging and other studies: I have personally reviewed pertinent reports        No results found for: WBC, HGB, HCT, MCV, PLT            Rosalinda Walsh DO  12/19/17

## 2017-12-19 NOTE — ANESTHESIA PROCEDURE NOTES
Peripheral Block    Patient location during procedure: holding area  Start time: 12/19/2017 11:15 AM  Reason for block: at surgeon's request and post-op pain management  Staffing  Anesthesiologist: Dorothy Reynoso  Performed: anesthesiologist   Preanesthetic Checklist  Completed: patient identified, site marked, surgical consent, pre-op evaluation, timeout performed, IV checked, risks and benefits discussed and monitors and equipment checked  Peripheral Block  Patient position: supine  Prep: ChloraPrep  Patient monitoring: continuous pulse ox, frequent blood pressure checks, cardiac monitor and heart rate  Block type: TAP  Laterality: bilateral  Injection technique: single-shot  Procedures: ultrasound guided  ultrasound permanent image saved      Needle  Needle type: Stimuplex   Needle gauge: 22 G  Needle length: 4    Needle localization: ultrasound guidance  Needle insertion depth: 3 cm  Assessment  Injection assessment: incremental injection, local visualized surrounding nerve on ultrasound, negative aspiration for heme and no paresthesia on injection  Paresthesia pain: none  Heart rate change: no  Slow fractionated injection: yes  Post-procedure:  site cleaned  patient tolerated the procedure well with no immediate complications

## 2017-12-19 NOTE — H&P
The history and physical were scanned into EPIC per hospital protocol  There are no changes  Evgeny Singh MD, PhD, Kevin Carmen, [facility]  Gynecologic Oncologist

## 2017-12-20 LAB
ABO GROUP BLD BPU: NORMAL
ABO GROUP BLD BPU: NORMAL
BASOPHILS # BLD AUTO: 0.02 THOUSANDS/ΜL (ref 0–0.1)
BASOPHILS NFR BLD AUTO: 0 % (ref 0–1)
BPU ID: NORMAL
BPU ID: NORMAL
CA-I BLD-SCNC: 1.06 MMOL/L (ref 1.12–1.32)
CROSSMATCH: NORMAL
CROSSMATCH: NORMAL
EOSINOPHIL # BLD AUTO: 0 THOUSAND/ΜL (ref 0–0.61)
EOSINOPHIL NFR BLD AUTO: 0 % (ref 0–6)
ERYTHROCYTE [DISTWIDTH] IN BLOOD BY AUTOMATED COUNT: 13.5 % (ref 11.6–15.1)
GLUCOSE SERPL-MCNC: 118 MG/DL (ref 65–140)
GLUCOSE SERPL-MCNC: 123 MG/DL (ref 65–140)
GLUCOSE SERPL-MCNC: 138 MG/DL (ref 65–140)
GLUCOSE SERPL-MCNC: 93 MG/DL (ref 65–140)
HCT VFR BLD AUTO: 33.8 % (ref 34.8–46.1)
HGB BLD-MCNC: 12 G/DL (ref 11.5–15.4)
LYMPHOCYTES # BLD AUTO: 1.74 THOUSANDS/ΜL (ref 0.6–4.47)
LYMPHOCYTES NFR BLD AUTO: 10 % (ref 14–44)
MCH RBC QN AUTO: 30.1 PG (ref 26.8–34.3)
MCHC RBC AUTO-ENTMCNC: 35.5 G/DL (ref 31.4–37.4)
MCV RBC AUTO: 85 FL (ref 82–98)
MONOCYTES # BLD AUTO: 1.48 THOUSAND/ΜL (ref 0.17–1.22)
MONOCYTES NFR BLD AUTO: 9 % (ref 4–12)
NEUTROPHILS # BLD AUTO: 13.73 THOUSANDS/ΜL (ref 1.85–7.62)
NEUTS SEG NFR BLD AUTO: 81 % (ref 43–75)
NRBC BLD AUTO-RTO: 0 /100 WBCS
PLATELET # BLD AUTO: 205 THOUSANDS/UL (ref 149–390)
PMV BLD AUTO: 10.4 FL (ref 8.9–12.7)
RBC # BLD AUTO: 3.99 MILLION/UL (ref 3.81–5.12)
UNIT DISPENSE STATUS: NORMAL
UNIT DISPENSE STATUS: NORMAL
UNIT PRODUCT CODE: NORMAL
UNIT PRODUCT CODE: NORMAL
UNIT RH: NORMAL
UNIT RH: NORMAL
WBC # BLD AUTO: 17.05 THOUSAND/UL (ref 4.31–10.16)

## 2017-12-20 PROCEDURE — 82948 REAGENT STRIP/BLOOD GLUCOSE: CPT

## 2017-12-20 PROCEDURE — 85025 COMPLETE CBC W/AUTO DIFF WBC: CPT | Performed by: OBSTETRICS & GYNECOLOGY

## 2017-12-20 PROCEDURE — 82330 ASSAY OF CALCIUM: CPT | Performed by: PHYSICIAN ASSISTANT

## 2017-12-20 RX ORDER — POLYETHYLENE GLYCOL 3350 17 G/17G
17 POWDER, FOR SOLUTION ORAL DAILY PRN
Status: DISCONTINUED | OUTPATIENT
Start: 2017-12-20 | End: 2017-12-21 | Stop reason: HOSPADM

## 2017-12-20 RX ORDER — OXYCODONE HYDROCHLORIDE 5 MG/1
5 TABLET ORAL EVERY 4 HOURS PRN
Status: DISCONTINUED | OUTPATIENT
Start: 2017-12-20 | End: 2017-12-21 | Stop reason: HOSPADM

## 2017-12-20 RX ORDER — OXYCODONE HYDROCHLORIDE 10 MG/1
10 TABLET ORAL EVERY 4 HOURS PRN
Status: DISCONTINUED | OUTPATIENT
Start: 2017-12-20 | End: 2017-12-21 | Stop reason: HOSPADM

## 2017-12-20 RX ORDER — HEPARIN SODIUM 5000 [USP'U]/ML
5000 INJECTION, SOLUTION INTRAVENOUS; SUBCUTANEOUS EVERY 8 HOURS SCHEDULED
Status: DISCONTINUED | OUTPATIENT
Start: 2017-12-20 | End: 2017-12-20

## 2017-12-20 RX ORDER — OXYBUTYNIN CHLORIDE 5 MG/1
10 TABLET, EXTENDED RELEASE ORAL
Status: DISCONTINUED | OUTPATIENT
Start: 2017-12-20 | End: 2017-12-21 | Stop reason: HOSPADM

## 2017-12-20 RX ORDER — ASPIRIN 81 MG/1
81 TABLET ORAL DAILY
Status: DISCONTINUED | OUTPATIENT
Start: 2017-12-20 | End: 2017-12-21 | Stop reason: HOSPADM

## 2017-12-20 RX ORDER — MELATONIN
1000 DAILY
Status: DISCONTINUED | OUTPATIENT
Start: 2017-12-20 | End: 2017-12-21 | Stop reason: HOSPADM

## 2017-12-20 RX ORDER — ACETAMINOPHEN 325 MG/1
650 TABLET ORAL EVERY 6 HOURS PRN
Status: DISCONTINUED | OUTPATIENT
Start: 2017-12-20 | End: 2017-12-21 | Stop reason: HOSPADM

## 2017-12-20 RX ORDER — SIMETHICONE 80 MG
80 TABLET,CHEWABLE ORAL EVERY 6 HOURS PRN
Status: DISCONTINUED | OUTPATIENT
Start: 2017-12-20 | End: 2017-12-21 | Stop reason: HOSPADM

## 2017-12-20 RX ORDER — OXYCODONE HYDROCHLORIDE 5 MG/1
5 TABLET ORAL EVERY 4 HOURS PRN
Status: DISCONTINUED | OUTPATIENT
Start: 2017-12-20 | End: 2017-12-20

## 2017-12-20 RX ADMIN — Medication 1 SPRAY: at 05:43

## 2017-12-20 RX ADMIN — GEMFIBROZIL 600 MG: 600 TABLET ORAL at 17:00

## 2017-12-20 RX ADMIN — OXYBUTYNIN CHLORIDE 10 MG: 5 TABLET, EXTENDED RELEASE ORAL at 21:46

## 2017-12-20 RX ADMIN — OXYCODONE HYDROCHLORIDE AND ACETAMINOPHEN 1 TABLET: 5; 325 TABLET ORAL at 02:26

## 2017-12-20 RX ADMIN — ENOXAPARIN SODIUM 40 MG: 40 INJECTION SUBCUTANEOUS at 12:38

## 2017-12-20 RX ADMIN — ASPIRIN 81 MG: 81 TABLET, COATED ORAL at 11:08

## 2017-12-20 RX ADMIN — OXYCODONE HYDROCHLORIDE 5 MG: 5 TABLET ORAL at 18:04

## 2017-12-20 RX ADMIN — VENLAFAXINE 75 MG: 37.5 TABLET ORAL at 18:12

## 2017-12-20 RX ADMIN — VENLAFAXINE 75 MG: 37.5 TABLET ORAL at 09:00

## 2017-12-20 RX ADMIN — GEMFIBROZIL 600 MG: 600 TABLET ORAL at 09:00

## 2017-12-20 RX ADMIN — CALCIUM GLUCONATE 2 G: 94 INJECTION, SOLUTION INTRAVENOUS at 12:33

## 2017-12-20 RX ADMIN — VITAMIN D, TAB 1000IU (100/BT) 1000 UNITS: 25 TAB at 09:00

## 2017-12-20 RX ADMIN — ARIPIPRAZOLE 10 MG: 10 TABLET ORAL at 11:08

## 2017-12-20 NOTE — PROGRESS NOTES
Progress Note - Critical Care   Christine Lynch 63 y o  female MRN: [id number]  Unit/Bed#: [facility] 349-76 Encounter: [id number]    Attending Physician: Glen Oakes DO      ______________________________________________________________________  Assessment and Plan:   Principal Problem:    S/P hysterectomy  Active Problems:    Postoperative anemia  Resolved Problems:    * No resolved hospital problems  *        Neuro:    -Hx of depression: Effexor 75mg daily and Abilify 10mg daily ordered to start today   -Is on Mydayis -- adjuvant therapy for depression, per pt  Pain:   -Percocet 5 325mg Q 4 H PRN for moderate pain and 2 tabs for severe pain  Received 1 tablet at 0226 today     -Consider adjustment to: Tylenol 650 Q 4 PRN for mild pain, oxycodone 5mg Q 4 H prn for moderate pain, and oxycodone 10mg Q 4 H prn for severe pain  Sedation: None      CV:    -Post-operative hypotension, likely due to intra-abdominal bleeding, both of which have resolved  Old clot noted upon return to OR for ex lap, but no active bleeding  Check serial abd exams and monitor for increase in pain  Monitor for hypotension    -Hx of HTN: on Accupril 5mg daily at home  On hold, given the above  -Dyslipidemia: Lopid 500mg BID at home  Re-ordered to start today    -Is on ASA 81mg at home, presumably for DM; will not re-order at this time in consideration of her recent bleed  Pulm:    -Arrived to the unit on 1L via NC saturating at 100%  Removed NC; Ot ranging 94-97% since then    -No active issues    GI:    -Hx of gastric bypass  No active issues    -Zofran PRN  Bowel:   -No BM yet   -Miralax ordered PRN    :    -Diagnosis of stage 1 endometrial CA POD #1 s/p LTH/SBO   -Discussed briefly with GYN oncology  If her hgb on AM labs is stable, can transfer to med surg today  -OAB: Myrbetriq and Ditropan at home  Her UO has normalized  Will re-order    -975 mL of urine in the last 12 hours   Sifuentes d/c'd by GYN    -Cholecalciferol 1000u daily at home; reordered to start today  F/E/N:    -Discontinue NSS  Overall volume overload after fluid resuscitation and blood transfusion yesterday; in addition, will be starting regular diet today   -AM labs pending   -Last BMP at 2253 last night showed hypocalcemia at 7 3; however, previous BMP earlier yesterday was 8 0 but with normal iCAL of 1 17  Repeat iCal today  -Hypoalbuminemia: 2 5 yesterday   -Discussed briefly with GYN oncology -- they will advance her diet and also ordered sliding scale insulin  ID:    -Tmax 99 4   -CBC pending   -No concerns for infection at this time    Heme:    -Hx of anemia at baseline, now with superimposed ABLA: hgb improved from 7 5 to 13 after 4 units of PRBCs yesterday  AM CBC pending    -No pharmacologic VTE ppx in light of her recent bleed  Consider starting this today  -SCDs in place for ppx   -Takes ferrous sulfate at home  Will not re-order until eating to avoid nausea  Endo:   -Hx of DM on metformin at home: metformin was on hold while NPO    -When seen by GYN today, there was concern about a POCT glucose that was 323 yesterday around 1730, and so an insulin drip was ordered by their service  However, she did have a glucose of 153 at 2300 last night on her BMP, so the plan was to check POCT glucose and order sliding scale for coverage if needed  Her POCT this AM was 118  Insulin drip discontinued and algorithm 3 ordered  -Diabetic diet ordered by GYN   -Can restart metformin once eating     Msk/Skin:    -Monitor surgical site for signs of infection   -Attempt OOB today, consider PT/OT    Disposition: Consider transfer to med-surg later today    Code Status: No Order    Counseling / Coordination of Care  Total Critical Care time spent 45 minutes excluding procedures, teaching and family updates  ______________________________________________________________________    Chief Complaint: Muscle tightness in back    24 Hour Events: Pt underwent LTH/BSO yesterday  Became hypotensive and had a drop in hgb post-op  Returned to OR and had evacuation of ~500cc old clot, no active bleeding  Has been stable since fluid resuscitation and transfusion of 4 PRBCs  Hgb overnight improved from 7 5 to 13  Awaiting AM result       ______________________________________________________________________    Physical Exam:     Constitutional: A&O x 4, sitting up in chair in NAD  HENT: NC/AT, normal neck ROM  Cv: RRR, no murmur, SR on tele    Pulm: Normal chest expansion, normal effort  CTAB  Pt complaints of pain across upper abdomen with deep breaths    Gi: Soft, nontender, three laparoscopy incisions are C/D/I    Gu: Sifuentes in place    Ms: trace pitting edema, but extremities appear swollen    Neuro: GCS 15  No focal deficit  Skin: Warm and dry    ______________________________________________________________________  Vitals:    17 0200 17 0300 17 0400 17 0500   BP: 135/71 138/71 133/71 125/67   Pulse: 62 68 72 72   Resp:    Temp:       TempSrc:       SpO2: 97% 95% 94% 95%   Weight:       Height:           Temperature:   Temp (24hrs), Av 9 °F (36 6 °C), Min:97 3 °F (36 3 °C), Max:99 4 °F (37 4 °C)    Current Temperature: (!) 97 4 °F (36 3 °C)  Weights:   IBW: 43 2 kg    Body mass index is 48 53 kg/m²    Weight (last 2 days)     Date/Time   Weight    17 0000  109 (240 3)    17 0815  102 (225)            Hemodynamic Monitoring:  N/A     Non-Invasive/Invasive Ventilation Settings:  Respiratory    Lab Data (Last 4 hours)    None         O2/Vent Data (Last 4 hours)    None              No results found for: PHART, ULW4AVG, PO2ART, AKK6SOW, X1UEZHUZ, BEART, SOURCE  SpO2: SpO2: 95 %  Intake and Outputs:  I/O        07 -  0700  07 -  0700    I V  (mL/kg)  9668 3 (88 7)    Blood  700    Total Intake(mL/kg)  78010 3 (95 1)    Urine (mL/kg/hr)  900    Blood  700    Total Output   1600    Net   +8768 3 Nutrition:        Diet Orders            Start     Ordered    12/20/17 0645  Diet Cirilo/CHO Controlled; Consistent Carbohydrate Diet Level 1 (4 carb servings/60 grams CHO/meal)  Diet effective now     Question Answer Comment   Diet Type Cirilo/CHO Controlled    Cirilo/CHO Controlled Consistent Carbohydrate Diet Level 1 (4 carb servings/60 grams CHO/meal)    RD to adjust diet per protocol? Yes        12/20/17 0648        Labs:     Results from last 7 days  Lab Units 12/19/17  2253 12/19/17  1733 12/19/17  1535   WBC Thousand/uL 26 55* 16 58* 18 91*   HEMOGLOBIN g/dL 13 0 7 5* 9 1*   I STAT HEMOGLOBIN g/dl  --   --  8 8*   HEMATOCRIT % 37 5 22 8* 27 1*   PLATELETS Thousands/uL 192 394* 371   MONO PCT MAN %  --  2*  --        Results from last 7 days  Lab Units 12/19/17  2253 12/19/17  1535   SODIUM mmol/L 138 137   POTASSIUM mmol/L 4 1 4 1   CHLORIDE mmol/L 107 105   CO2 mmol/L 22 22   BUN mg/dL 14 13   CREATININE mg/dL 0 61 0 80   CALCIUM mg/dL 7 3* 8 0*   TOTAL PROTEIN g/dL  --  5 5*   BILIRUBIN TOTAL mg/dL  --  0 32   ALK PHOS U/L  --  99   ALT U/L  --  66   AST U/L  --  92*   GLUCOSE RANDOM mg/dL 153* 210*   GLUCOSE, ISTAT mg/dl  --  219*       Results from last 7 days  Lab Units 12/19/17  1535   MAGNESIUM mg/dL 1 6     No results found for: PHOS     Results from last 7 days  Lab Units 12/19/17  2253   INR  1 14     No results found for: TROPONINI    Results from last 7 days  Lab Units 12/19/17  2253   LACTIC ACID mmol/L 1 4     ABG:No results found for: PHART, BWJ8SAA, PO2ART, LEP5JUC, T4GEORYW, BEART, SOURCE  Imaging: None new I have personally reviewed pertinent reports      EKG: None new  Micro:  Lab Results   Component Value Date    URINECX No Growth <1000 cfu/mL 10/24/2017     Allergies: No Known Allergies  Medications:   Scheduled Meds:    ARIPiprazole 10 mg Oral Daily   cholecalciferol 1,000 Units Oral Daily   gemfibrozil 600 mg Oral BID AC   insulin lispro 1-6 Units Subcutaneous TID AC   venlafaxine 75 mg Oral BID     Continuous Infusions:   PRN Meds:    ondansetron 4 mg Q6H PRN   oxyCODONE-acetaminophen 1 tablet Q4H PRN   oxyCODONE-acetaminophen 2 tablet Q4H PRN   phenol 1 spray Q2H PRN   polyethylene glycol 17 g Daily PRN     VTE Pharmacologic Prophylaxis: Pharmacologic VTE Prophylaxis contraindicated due to ABLA, bleed  VTE Mechanical Prophylaxis: sequential compression device  Invasive lines and devices: Invasive Devices     Peripheral Intravenous Line            Peripheral IV 12/19/17 Left Antecubital less than 1 day    Peripheral IV 12/19/17 Left Hand less than 1 day                     Portions of the record may have been created with voice recognition software  Occasional wrong word or "sound a like" substitutions may have occurred due to the inherent limitations of voice recognition software  Read the chart carefully and recognize, using context, where substitutions have occurred      Sara Luong PA-C

## 2017-12-20 NOTE — PROGRESS NOTES
Progress Note - OB/GYN   Pollie Gist Pulcini 63 y o  female MRN: [id number]  Unit/Bed#: [facility] 517-01 Encounter: [id number]    Assessment:  63 y o  female with biopsy proven grade 1 endometrial cancer POD#1 s/p RA-TLH, BSO, LND and POD#1 from diagnostic laparoscopy for intraabdominal bleeding  Plan:  1  Grade 1 endometrial cancer:   - s/p surgical management  - f/u final pathology  2  Anemia secondary to intraabdominal bleeding  - s/p 4u pRBC intraop  - Hgb 9 1-->7 5-->4U pRBCs-->13 0--> f/u AM CBC  - I&O's, monitor vitals  - Sifuentes in place, producing 122cc/hr, will likely discontinue this AM  3  Sore throat  - Chloraseptic  4  HTN  - Home quinapril held  - BPs overnight 120-140s/60-70s, much improved from yesterday's hypotension  5  Prediabetes  - Home metformin held  - Last fingerstick documented was 1718 yesterday, 328  - Pt will likely require a sliding scale insulin regimen today  6  S/o gastric bypass  - Home supplements held  - No NSAIDs  7  Depression  - Home abilify and effexor  8  Analgesia  - Oxycodone pain scale  9  FEN  - NPO, anticipate advancement of diet as tolerated today  - IVF  10  VTE ppx  - SCDs  11  Disposition  - Anticipate discharge from ICU today and transfer to floor    Subjective/Objective   Chief Complaint:     63 y o  female with biopsy proven grade 1 endometrial cancer POD#1 s/p RA-TLH, BSO, LND and POD#1 from diagnostic laparoscopy for intraabdominal bleeding  Subjective:     Pain: yes, mild and well controlled with current pain regimen  Tolerating PO: NPO overnight, feels thirsty  Able to tolerate PO prior to OR takeback last night  Voiding: Sifuentes in place  Flatus: no  BM: no  Ambulating: yes, OOB to chair this AM  Chest pain: no  Shortness of breath: no  Leg pain: no    Pt reports doing well and feeling well  No additional "funny" feelings overnight  Reports small amount of shoulder discomfort  Abdominal pain is still described as "uncomfortable but not really painful", a 2/10   The pain is worsened with movement  She denies appetite but does feel thirsty  Objective:     Vitals: Blood pressure 125/67, pulse 72, temperature (!) 97 4 °F (36 3 °C), temperature source Oral, resp  rate 17, height 4' 11" (1 499 m), weight 109 kg (240 lb 4 8 oz), SpO2 95 %  Physical Exam:   Gen: AaOx3, NAD, pleasant, sitting in chair  Color is much improved from yesterday  Pulm: No increased work of breathing  Abd: Soft, nontender, nondistended  Incisions are clean, dry, and intact  Extremities: Hands are edematous, lower extremities with 1+ nonpitting edema, nontender, Negative Homer's bilaterally      Lab, Imaging and other studies: I have personally reviewed pertinent reports        Lab Results   Component Value Date    WBC 26 55 (H) 12/19/2017    HGB 13 0 12/19/2017    HCT 37 5 12/19/2017    MCV 87 12/19/2017     12/19/2017               Rosalinda Walsh DO  12/20/17

## 2017-12-20 NOTE — PROGRESS NOTES
I stop by to check in on the patient  Patient looks and feels good  Sifuentes catheter has been discontinued and the patient has already voided  Patient was able to tolerate some PO intake for lunch  I gave patient the good news about her a m  Hemoglobin (stable at 12 from 13)  Patient was in agreement with planning to stay overnight  Will recheck a CBC in a m  Otherwise, patient should be able to be discharged home tomorrow a m     I started patient on her prophylactic Lovenox today as she is hemodynamically stable  Evgeny Stewart MD, PhD, Marta Carver, [facility]  Gynecologic Oncologist

## 2017-12-20 NOTE — PROGRESS NOTES
Gyn Oncology Progress note   Shayan Allen 63 y o  female MRN: [id number]  Unit/Bed#: [facility] 517-01 Encounter: [id number]    A/P: 63 y o  female with biopsy proven grade 1 endometrial cancer POD#0 s/p RA-TLH, BSO, LND and POD#0 from diagnostic laparoscopy for intraabdominal bleeding  1  Grade 1 endometrial cancer  - s/p surgical management  - f/u final pathology  2  Anemia secondary to intraabdominal bleeding  - s/p 4u pRBC intraop  - Hgb 9 1-->7 5-->4U pRBCs-->13 0  - f/u AM CBC  - I&O's, monitor vitals  3  Sore throat  - Chloraseptic  4  HTN  - Home quinapril held  5  Prediabetes  - Home metformin held  6  S/o gastric bypass  - Home supplements held  - No NSAIDs  7  Depression  - Home abilify and effexor  8  Analgesia  - Oxycodone pain scale  9  FEN  - NPO  - IVF  10  VTE ppx  - SCDs        Subjective:  Patient doing well post-op  She notes a sore throat, but other pain is well controlled  Pt is NPO  Pt is not passing flatus  Pt is not ambulating  Denies fevers/chills, cp, sob, n/v/d, or leg pain  Objective:  Temp:  [97 3 °F (36 3 °C)-99 4 °F (37 4 °C)] 97 4 °F (36 3 °C)  HR:  [] 72  Resp:  [11-22] 11  BP: ()/(40-69) 127/69  Arterial Line BP: (138-141)/(60-70) 138/70     I/O last 3 completed shifts: In: 4381 [I V :6550]  Out: 500 [Urine:300; Blood:200]  I/O this shift:  In: 2700 [I V :2000;  Blood:700]  Out: 550 [Urine:50; Blood:500]    Lab Results   Component Value Date    WBC 26 55 (H) 12/19/2017    HGB 13 0 12/19/2017    HCT 37 5 12/19/2017    MCV 87 12/19/2017     12/19/2017       Lab Results   Component Value Date    GLUCOSE 153 (H) 12/19/2017    CALCIUM 7 3 (L) 12/19/2017     12/19/2017    K 4 1 12/19/2017    CO2 22 12/19/2017     12/19/2017    BUN 14 12/19/2017    CREATININE 0 61 12/19/2017       Lab Results   Component Value Date/Time    POCGLU 328 (H) 12/19/2017 05:18 PM    POCGLU 248 (H) 12/19/2017 03:26 PM    POCGLU 148 (H) 12/19/2017 11:52 AM    POCGLU 102 12/19/2017 08:06 AM       Physical Exam  Physical Exam   Constitutional: She appears well-developed and well-nourished  No distress  Eyes: No scleral icterus  Cardiovascular: Normal rate  Pulmonary/Chest: Effort normal  No accessory muscle usage  No respiratory distress  Abdominal: Soft  She exhibits no distension  There is no tenderness  There is no rebound and no guarding  Incisions clean/dry/intact, no erythema or drainage   Musculoskeletal: She exhibits no edema or tenderness (LE non-tender to palpation bilaterally)  Skin: Skin is warm and dry  No rash noted  No erythema  No pallor  Psychiatric: She has a normal mood and affect   Her behavior is normal        Franck Trimble MD   OB/GYN PGY-3  12/19/2017 11:46 PM

## 2017-12-20 NOTE — PROGRESS NOTES
History and Physical - Critical Care  Jennifer Braun 63 y o  female MRN: [id number]  Unit/Bed#: [facility] 517-01 Encounter: [id number]     Reason for Admission / Chief Complaint: Post-operative hypotension and ABLA     History of Present Illness:  Jennifer Braun is a 63 y o  female who is brought to the unit for monitoring s/p LTH/BSO and subsequent ex lab for post-operative hypotension and ABLA  The patient is a 62 yo female with a diagnosis of stage I endometrial CA scheduled for outpatient LTH/BSO today, which she underwent by Dr Maira Shepherd from GYN oncology  EBL during that procedure was 100 mL  BP was 121/62 immediately post-op, per anesthesia's note  She was transferred to the PACU where her BP was noted to be low (16A systolic); she was apparently asymptomatic at that time, but then developed dizziness, clamminess, and "foggy" thinking with a BP in the 60s/40s, and so a rapid response was called  She received 2L of LR; SBP improved to 92  Hgb was 9 1 on stat CBC  EKG showed no acute changes  She was transferred to med surg, where she again developed symptomatic hypotension (60s/40s)  A second CBC was done, which showed a drop in hgb to 7 9  Was notably not tachycardic  She received 2 units of PRBCs at that time with a bolus of LR and was taken emergently to the OR to look for source of bleeding  In the Or, ex lap revealed a clot of about 500cc which was evacuated, but no active bleeding was seen  Post-op, she has been stable with SBPs in the 120s  She denies any dizziness or "foggy" feeling, chest pain, SOB  Admits to "a tiny bit" of lower abdominal pain  History obtained from chart review and the patient       Past Medical History:  Past Medical History:   Diagnosis Date    Anemia     Depression     Diabetes mellitus ([facility] [address] )     Hyperlipidemia     Hypertension         Past Surgical History:  Past Surgical History:   Procedure Laterality Date    ABDOMINAL SURGERY      GASTRIC BYPASS    COLONOSCOPY      TONSILLECTOMY          Past Family History:  History reviewed  No pertinent family history  Social History:  History   Smoking Status    Never Smoker   Smokeless Tobacco    Never Used     History   Alcohol Use No     History   Drug Use No     Marital Status: Single     Medications:  Current Facility-Administered Medications   Medication Dose Route Frequency    ARIPiprazole (ABILIFY) tablet 10 mg  10 mg Oral Daily    gemfibrozil (LOPID) tablet 600 mg  600 mg Oral BID AC    ondansetron (ZOFRAN) injection 4 mg  4 mg Intravenous Q6H PRN    oxyCODONE-acetaminophen (PERCOCET) 5-325 mg per tablet 1 tablet  1 tablet Oral Q4H PRN    oxyCODONE-acetaminophen (PERCOCET) 5-325 mg per tablet 2 tablet  2 tablet Oral Q4H PRN    sodium chloride 0 9 % infusion  100 mL/hr Intravenous Continuous    venlafaxine (EFFEXOR) tablet 75 mg  75 mg Oral BID     Home medications:  Prior to Admission medications    Medication Sig Start Date End Date Taking?  Authorizing Provider   Amphet-Dextroamphet 3-Bead ER (MYDAYIS) 50 MG CP24 Take by mouth   Yes Historical Provider, MD   ARIPiprazole (ABILIFY) 10 mg tablet Take 10 mg by mouth daily   Yes Historical Provider, MD   aspirin (ECOTRIN LOW STRENGTH) 81 mg EC tablet Take 81 mg by mouth daily   Yes Historical Provider, MD   Biotin (BIOTIN 5000) 5 MG CAPS Take by mouth   Yes Historical Provider, MD   cholecalciferol (VITAMIN D3) 1,000 units tablet Take 1,000 Units by mouth daily   Yes Historical Provider, MD   Ferrous Sulfate (IRON) 28 MG TABS Take by mouth   Yes Historical Provider, MD   gemfibrozil (LOPID) 600 mg tablet Take 600 mg by mouth 2 (two) times a day before meals   Yes Historical Provider, MD   metFORMIN (GLUCOPHAGE) 500 mg tablet Take 500 mg by mouth 2 (two) times a day with meals   Yes Historical Provider, MD   Mirabegron (MYRBETRIQ PO) Take by mouth   Yes Historical Provider, MD   oxybutynin (DITROPAN-XL) 10 MG 24 hr tablet Take 10 mg by mouth daily at bedtime   Yes Historical Provider, MD   quinapril (ACCUPRIL) 5 mg tablet Take 5 mg by mouth daily at bedtime   Yes Historical Provider, MD   venlafaxine (EFFEXOR) 75 mg tablet Take 75 mg by mouth 2 (two) times a day   Yes Historical Provider, MD   enoxaparin (LOVENOX) 40 mg/0 4 mL Inject 0 4 mL under the skin every 24 hours for 14 days 17  Kimberly Bains   enoxaparin (LOVENOX) 40 mg/0 4 mL Inject 0 4 mL under the skin every 24 hours for 14 days 17  Kimberly Bains     Allergies:  No Known Allergies     ROS:   Review of Systems     Vitals:  Vitals:    17 2130 17 2145 17 2200 17 2300   BP: 120/65 114/68 127/69    Pulse: 82 72 72    Resp: 17 18 (!) 11    Temp:  (!) 97 3 °F (36 3 °C) 97 7 °F (36 5 °C) (!) 97 4 °F (36 3 °C)   TempSrc:   Oral Oral   SpO2: 97% 98% 99%    Weight:       Height:         Temperature:   Temp (24hrs), Av 9 °F (36 6 °C), Min:97 3 °F (36 3 °C), Max:99 4 °F (37 4 °C)    Current: Temperature: (!) 97 4 °F (36 3 °C)     Weights:   IBW: 43 2 kg  Body mass index is 45 44 kg/m²       Hemodynamic Monitoring:  N/A     Non-Invasive/Invasive Ventilation Settings:  Respiratory    Lab Data (Last 4 hours)    None         O2/Vent Data (Last 4 hours)    None              No results found for: PHART, CVE6CJV, PO2ART, DSO1TOI, B1CAGQWS, BEART, SOURCE  SpO2: SpO2: 99 %     Physical Exam:  Physical Exam     General: Resting comfortably, NAD  HENT: NC/AT  Cv: RRR, no murmur, SR on tele  Pulm/chest: normal effort, equal chest wall expansion, CTAB anterior  Abd: soft, nontender  Gu: deferred  Ms: no edema  Neuro: GCS 15, A&O x 4  Skin: warm and dry    Labs:    Results from last 7 days  Lab Units 17  2253 17  1733 17  1535   WBC Thousand/uL 26 55* 16 58* 18 91*   HEMOGLOBIN g/dL 13 0 7 5* 9 1*   I STAT HEMOGLOBIN g/dl  --   --  8 8*   HEMATOCRIT % 37 5 22 8* 27 1*   PLATELETS Thousands/uL 192 394* 371   MONO PCT MAN %  --  2*  --         Results from last 7 days  Lab Units 12/19/17  2253 12/19/17  1535   SODIUM mmol/L 138 137   POTASSIUM mmol/L 4 1 4 1   CHLORIDE mmol/L 107 105   CO2 mmol/L 22 22   BUN mg/dL 14 13   CREATININE mg/dL 0 61 0 80   CALCIUM mg/dL 7 3* 8 0*   TOTAL PROTEIN g/dL  --  5 5*   BILIRUBIN TOTAL mg/dL  --  0 32   ALK PHOS U/L  --  99   ALT U/L  --  66   AST U/L  --  92*   GLUCOSE RANDOM mg/dL 153* 210*   GLUCOSE, ISTAT mg/dl  --  219*       Results from last 7 days  Lab Units 12/19/17  1535   MAGNESIUM mg/dL 1 6            Results from last 7 days  Lab Units 12/19/17  2253   INR  1 14       Results from last 7 days  Lab Units 12/19/17  2253   LACTIC ACID mmol/L 1 4     No results found for: TROPONINI     Imaging: None new  I have personally reviewed pertinent reports  EKG: No acute abnormalities  This was personally reviewed by me  Micro:  Lab Results   Component Value Date    URINECX No Growth <1000 cfu/mL 10/24/2017       Assessment: 62 yo female with diagnosis of stage 1 endometrial cancer who had an outpatient LTH/BSO this morning  Became symptomatically hypotensive post-op, as low as 60s/40s, with acute drop in hgb (9 1 to 7 9)  Returned to OR emergently for ex lap to search for source of bleeding  Old clot identified and removed, no active bleeding  Has received a total of 4 units PRBCs and had 4 L of LR prior to OR  Has been stable since arriving to the unit post-op  Plan:                  Neuro:    -Hx of depression: Effexor 75mg daily and Abilify 10mg daily   -Is on Mydaysis (dextroamphetamine/amphetamine), but cannot identify a diagnosis of ADHD in her chart  Will address this with the patient in the morning  Pain: Percocet 5/325mg Q 4 H PRN for moderate pain and 2 tabs of 5/325mg Q 4 H PRN for severe pain  If receiving max daily amount, would receive 3900 mg of APAP, but patient is not having any pain now; do not anticipate that she would need pain control anywhere near that amount   An alternative regimen could be Tylenol 650 Q 4 PRN for mild pain, oxycodone 5mg Q 4 H prn for moderate pain, and oxycodone 10mg Q 4 H prn for severe pain  CV:    -Post-operative hypotension, likely due to intra-abdominal bleeding, both of which have since resolved  Old clot noted in OR, but no active bleeding  Serial abdominal exams, monitor for increase in abd pain  Monitor for hypotension  Currently she is hemodynamically stable  If she remains so, can likely transfer out of the unit tomorrow    -Hx of HTN: on Accupril 5mg daily at home  Do not order at this time given her recent hypotension    -Hx of dyslipidemia: on Lopid 600mg BID at home  Will continue    -Is on ASA 81mg at home (presumably for dx of DM) will not reorder at this time in consideration of her course thus far  Lung:    -Arrived to the unit on 1L via NC; saturating 100%  Removed NC; will monitor SaO2    -No other acute issues                 GI:    -Hx of gastric bypass  No acute issues    -Zofran ordered PRN                  FEN:    -NSS at 100 mL/hour   -Normal electrolyes on labs early this AM  Repeat currently pending  -NPO with sips with meds  Consider advancing diet in AM if OK with GYN  :    -Stage 1 endometrial CA POD #0 s/p LTH/BSO  -OAB: On Myrbetriq and Ditropan at home  Will hold off on re-ordering these until we have more information about her urine output  Has had only 50 cc of urine out since second surgery     -Takes vitamin D at home  Will continue  ID:    -Afebrile  WBC count was elevated at 19 pre-op, but less so at 16 post-op  No concerns for active infection  Repeat CBC pending  Heme:    -Hx of anemia at baseline with ABLA: hgb dropped from 9 1 (at 1530) to 7 5 (at 1730)  She received a total of 4 u PRBC  Repeat CBC shows hgb of 13    -No pharmacologic prophylaxis at this time in light of the above  Consider restarting tomorrow if she remains stable     -SCDs in place    -Takes ferrous sulfate at home  Will not restart until she is able to eat to avoid nausea  Endo:    -Hx of DM: On metformin 500mg BID  Will not order at this time while she is still NPO  Monitor glucose with Accuchecks Q 6  Will cover with Humalog if necessary  Msk/Skin:    -Monitor surgical incisions for signs of infection   -Likely out of bed tomorrow                 Disposition: ICU     VTE Pharmacologic Prophylaxis: Reason for no pharmacologic prophylaxis ABLA  VTE Mechanical Prophylaxis: sequential compression device     Invasive lines and devices: Invasive Devices     Peripheral Intravenous Line            Peripheral IV 12/19/17 Left Antecubital less than 1 day    Peripheral IV 12/19/17 Left Hand less than 1 day                 Code Status: No Order  POA:    POLST:       Given critical illness, patient length of stay will require greater than two midnights  Counseling / Coordination of Care  Total Critical Care time spent 60 minutes excluding procedures, teaching and family updates  Portions of the record may have been created with voice recognition software  Occasional wrong word or "sound a like" substitutions may have occurred due to the inherent limitations of voice recognition software  Read the chart carefully and recognize, using context, where substitutions have occurred          Jennifer Khan PA-C

## 2017-12-20 NOTE — MALNUTRITION/BMI
This medical record reflects one or more clinical indicators suggestive of malnutrition and/or morbid obesity  Please indicate the one diagnosis below which you feel best reflects the clinical picture  Morbid Obesity      BMI Findings:  45-49 9    Body mass index is 48 53 kg/m²  See Nutrition note dated 12/20/17 for additional details  Completed nutrition assessment is viewable in the nutrition documentation

## 2017-12-20 NOTE — PROGRESS NOTES
Patient was assessed at bedside  Due to acute drop in hemoglobin patient will be returned to the operating room for diagnostic laparoscopy, exploratory laparotomy and any other procedure indicated  Patient has not mounted the normal tachycardic response that would be seen with intra-abdominal hemorrhage  Patient continues to mentate fine, however her low blood pressure and drop in hemoglobin are concerning  Abdomen remains soft  Patient is able to answer questions and appears calm  Sifuentes catheter placed at bedside and 4 units of packed red blood cells will be transfused  I gave the family an update and answered all their questions  Evgeny Shepherd MD, PhD, Adolfo Salcido, [facility]  Gynecologic Oncologist

## 2017-12-20 NOTE — OP NOTE
OPERATIVE REPORT  PATIENT NAME: Keith Kaiser    :  1955  MRN: [id number]  Pt Location:  OR ROOM 07    SURGERY DATE: 2017    Surgeon(s) and Role:     * Matthias Richardson MD - Primary     * Kimberly Bains - Assisting     * Rosalinda Walsh DO - Assisting    Preop Diagnosis:  Postoperative anemia [D64 9]  S/P hysterectomy [Z90 710]    Post-Op Diagnosis Codes:     * Postoperative anemia [D64 9]     * S/P hysterectomy [Z90 710]    Procedure(s) (LRB):  LAPAROSCOPY DIAGNOSTIC (N/A)    Specimen(s): None    Estimated Blood Loss:  500 mL of evacuated blood clot (no sign of active bleeding)  IVF: 2000 cc LR, 2 units of packed red blood cells  UOP: 300 cc    Drains:-Sifuentes catheter to gravity       Anesthesia Type:   General    Operative Indications:  Postoperative anemia [D64 9]  S/P hysterectomy [Z90 710]  The patient is a delightful 22-year-old who underwent total laparoscopic hysterectomy, bilateral salpingo-oophorectomy and sentinel bilateral pelvic lymph node dissection earlier today for grade 1 endometrial cancer  Patient was taken back to the operating room for suspected intra-abdominal bleeding  Operative Findings:  1) no signs of active bleeding  2) organized blood clot in the abdomen and pelvis  3) continued observation at low intra-abdominal pressures confirmed no active bleeding  Complications:   None    Procedure and Technique:  After signing informed consent the patient was returned to the OR where general endotracheal anesthesia was administered  Patient was positioned in the dorsal lithotomy position  A vaginal exam was performed which confirmed that her vaginal cuff was completely intact and there was no signs of bleeding  Patient was then prepped and draped in the usual sterile fashion  One of the previous trocar sites in the left flank was opened  Under direct visualization the camera was inserted into the patient's abdomen  Pneumoperitoneum was generated    The patient was placed in Trendelenburg  There was no active bleeding seen  An additional 5 mm port was placed in the far left flank  A suction  was used to evacuate 500 cc of old blood  The intra-abdominal pressure was then dropped to 5 and continued observation revealed no signs of active bleeding  The patient was placed in alternating steep Trendelenburg and then leveled to assure patient was not actively bleeding  At this point in time the decision was made to leave the already organized clot on the remaining areas of the abdomen and pelvis  The trocars were removed under direct visualization  Trocar ports were closed with 4-0 Vicryl and Dermabond  The patient remained hemodynamically stable throughout the procedure       I was present for the entire procedure    Patient Disposition:  Critical Care Unit, guarded condition and extubated and stable    SIGNATURE: Praneeth Abreu MD  DATE: December 19, 2017  TIME: 9:19 PM

## 2017-12-20 NOTE — SOCIAL WORK
CM met with pt, Aox4 to discuss role of CM and d/c planning  Pt lives with her mother in 2 story home, 1 RAJANI to enter  12 RAJANI to secodn floor  No DME, no hx VNA  Hx of Good Brunson 7 or 8 years ago  Pt has a diagnosis of depression, sees OP Psychiatrist for medications  Preferred Pharmacy is [facility] on [address] in [place]  Pt reports being IPTA  No POA or Lw  PCP is Dr Sophia Allred     CM reviewed d/c planning process including the following: identifying help at home, patient preference for d/c planning needs, Discharge Lounge, [facility] Meds to Bed program, availability of treatment team to discuss questions or concerns patient and/or family may have regarding understanding medications and recognizing signs and symptoms once discharged  CM also encouraged patient to follow up with all recommended appointments after discharge  Patient advised of importance for patient and family to participate in managing patients medical well being  Discharge checklist discussed with patient and family

## 2017-12-20 NOTE — DISCHARGE SUMMARY
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    Additional Diagnosis:  Prediabetes  Hypertension  Depression  History of gastric bypass surgery    Discharge Diagnosis:   Same as above    Procedures Performed:   Robotic-Assisted Total laparoscopic hysterectomy  Bilateral salpingo-oophorectomy  Lymph node dissection  Lysis of adhesions  Diagnostic laparoscopy    HPI:  Vera Chavarria is a 67yo who presented 12/19/2017 for RATLH,BSO, and pelvic lymph node dissection for FIGO grade 1 endometrioid adenocarcinoma  On postoperative day #0, patient became hypotensive  She was fluid resuscitated and received in total 4U pRBC's, and underwent a subsequent diagnostic laparoscopy with evacuation of hemoperitoneum  Hospital Course:  FIGO Grade 1 Endometrioid adenocarcinoma: s/p surgery  Will follow-up final pathology  Postoperative anemia:  On POD#0, patient became hypotensive, oliguria, and Hgb returned 7 5  Patient received 2U pRBC's preoperatively followed by a diagnostic laparoscopy and evacuation of 500cc of blood  No active bleeding  Patient then received 2U pRBC's postoperatively  Hemoglobin pre-transfusion was 7 5 and post-transfusion was 13  On day of discharge, hemoglobin stable at 10 7  Patient was hemodynamically stable and asymptomatic  Hypertension: Home medications held given hypotension on postoperative day #0    Prediabetes: Metformin held  Was initiated on sliding scale coverage algorithm #3  Accuchecks were monitored    Depression: Home medications continued postoperatively  On day of discharge, she was ambulating and able to reasonably perform all ADLs, pain was well controlled   She was voiding and had appropriate bowel function  Pain was well controlled  She was discharged home on post-operative day #2 without complications  Patient was instructed to follow up with Dr Nataliya Patiño as an outpatient and was given appropriate warnings to call provider if she develops signs of infection or uncontrolled pain  She has a Percocet script for analgesia at home  Lab Results:   Lab Results   Component Value Date    WBC 17 05 (H) 12/20/2017    HGB 12 0 12/20/2017    HCT 33 8 (L) 12/20/2017    MCV 85 12/20/2017     12/20/2017     Lab Results   Component Value Date    GLUCOSE 153 (H) 12/19/2017    CALCIUM 7 3 (L) 12/19/2017     12/19/2017    K 4 1 12/19/2017    CO2 22 12/19/2017     12/19/2017    BUN 14 12/19/2017    CREATININE 0 61 12/19/2017     Lab Results   Component Value Date/Time    POCGLU 138 12/20/2017 11:22 AM    POCGLU 118 12/20/2017 06:39 AM    POCGLU 328 (H) 12/19/2017 05:18 PM    POCGLU 248 (H) 12/19/2017 03:26 PM    POCGLU 148 (H) 12/19/2017 11:52 AM     No results found for: PTT  Lab Results   Component Value Date    INR 1 14 12/19/2017    PROTIME 14 6 (H) 85/61/5630       Complications:   Postoperative hemorrhage; pt underwent diagnostic laparoscopy    Condition at Discharge:   stable     Discharge Medications:   See after visit summary for reconciled discharge medications provided to patient and family  Discharge instructions: See after visit summary for complete information  Do not place anything (no partner, tampons or douche) in your vagina for 6 weeks    You may walk for exercise for the first 6 weeks then gradually return to your usual activities     Please do not drive until tolerating pain and off of narcotics     You may take baths or shower per your preference     Please look at your incision in the mirror daily and call for redness or tenderness or increased warmth   Call us for increasing redness or steady drainage from the incision     Please call us for temperature > 100 4*F or 38* C, worsening pain or a foul discharge  Provisions for Follow-Up Care:   See after visit summary for information related to follow-up care and any pertinent home health orders        Disposition: Home  Planned Readmission: No    Code Status: full code

## 2017-12-20 NOTE — CASE MANAGEMENT
[address] [place] [address] in the [facility] by [facility] for 2017  Network Utilization Review Department  Phone: [phone]; Fax [phone]  ATTENTION: The Network Utilization Review Department is now centralized for our 7 Facilities  Make a note that we have a new phone and fax numbers for our Department  Please call with any questions or concerns to [phone] and carefully follow the prompts so that you are directed to the right person  All voicemails are confidential  Fax any determinations, approvals, denials, and requests for initial or continue stay review clinical to [phone]  Due to HIGH CALL volume, it would be easier if you could please send faxed requests to expedite your requests and in part, help us provide discharge notifications faster  Initial Clinical Review  SCHEDULED INPATIENT SURGERY /  IP PROCEDURE [id number] JEAMNARIE - Gila Ohara # [id number]     Age/Sex: 63 y o  female    OPERATIVE REPORT  PATIENT NAME:   Barry Foley    :  1955  MRN: [id number]  Pt Location: BE OR ROOM 14     SURGERY DATE: 2017     Preop Diagnosis:  Malignant neoplasm of endometrium ([address] ) [C54 1]     Post-Op Diagnosis Codes:     * Malignant neoplasm of endometrium (HCC) [C54 1]     Procedure(s) (LRB):  HYSTERECTOMY LAPAROSCOPIC TOTAL ([address]) W/ ROBOTICS; BILATERAL SALPINGOOPHERECTOMY; LYMPH NODE DISSECTION; lysis of adhesions (N/A)     Anesthesia Type: General     Operative Indications:  Malignant neoplasm of endometrium ([address] ) [C54 1]  The patient is a delightful 79-year-old with 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    She presents for definitive surgical management        Operative Findings:  1) significant adhesions of the omentum to the anterior abdominal wall  2) bilateral adnexae adherent to the pelvic sidewalls  3) grossly normal appearing cervix, uterus, bilateral fallopian tubes and ovaries  4) obvious external iliac bilateral sentinel pelvic lymph nodes (CONFIRMED BY TOUCH-PREP)  5) no suspicious lymph nodes or extra uterine disease        Admission Orders: Date/Time/Statement: 12/19/17 AT 1543     12/19/17 1543  Inpatient Admission Once     Transfer Service: GYN Oncology       Question Answer Comment   Admitting Physician MICHELLE DOTSON    Level of Care Med Surg - Level 2 Stepdown    Estimated length of stay More than 2 Midnights    Certification I certify that inpatient services are medically necessary for this patient for a duration of greater than two midnights  See H&P and MD Progress Notes for additional information about the patient's course of treatment  Vital Signs: /67   Pulse 74   Temp 97 7 °F (36 5 °C) (Oral)   Resp (!) 24   Ht 4' 11" (1 499 m)   Wt 109 kg (240 lb 4 8 oz)   SpO2 96%   BMI 48 53 kg/m²     Diet:        Diet Orders            Start     Ordered    12/20/17 0645  Diet Cirilo/CHO Controlled; Consistent Carbohydrate Diet Level 1 (4 carb servings/60 grams CHO/meal)  Diet effective now     Question Answer Comment   Diet Type Cirilo/CHO Controlled    Cirilo/CHO Controlled Consistent Carbohydrate Diet Level 1 (4 carb servings/60 grams CHO/meal)    RD to adjust diet per protocol? Yes        12/20/17 0648          Mobility:   Up as Tolerated    DVT Prophylaxis:  ASA ; SCD    Scheduled Meds:  ARIPiprazole 10 mg Oral Daily   aspirin 81 mg Oral Daily   calcium gluconate 2 g Intravenous Once   cholecalciferol 1,000 Units Oral Daily   gemfibrozil 600 mg Oral BID AC   insulin lispro 1-6 Units Subcutaneous TID AC   oxybutynin 10 mg Oral HS   venlafaxine 75 mg Oral BID     PRN Meds:     ibuprofen (MOTRIN) tablet 600 mg q6hrs prn given x 1/ 24 hrs    ondansetron    oxyCODONE-acetaminophen 1 Tablet q4hrs prn given x 2/ 24 hrs    Phenol (CHLORASEPTIC) 1 4 %    polyethylene glycol       Critical Care/ICU  Rapid Response Date of Service: 12/19/2017  3:32 PM     Assessment:   1   Hypotensive POD# 0 s/p Hysterectomy, b/l salpingoopherectomy and lymph node dissection     Plan:   · Given 2 L IVFs with good response (SBP 92), she had received 2 liters prior to RRT (So 4 liters total post opt)  · Stat labs: CBC, CMP, Mg, trop  Istat with hgb of 8 8, no prior CBC on record  · EkG with no ST changes, SR  · Plan to admit         OP # 2 GYN Oncology  OP Note Date of Service: 2017  9:19 PM     OPERATIVE REPORT  PATIENT NAME:   Negar Cardenas    :  1955  MRN: [id number]  Pt Location:  OR ROOM 07     SURGERY DATE: 2017     Preop Diagnosis:  Postoperative anemia [D64 9]  S/P hysterectomy [Z90 710]     Post-Op Diagnosis Codes:     * Postoperative anemia [D64 9]     * S/P hysterectomy [Z90 710]     Procedure(s) (LRB):  LAPAROSCOPY DIAGNOSTIC (N/A)     Specimen(s): None     Estimated Blood Loss:  500 mL of evacuated blood clot (no sign of active bleeding)  IVF: 2000 cc LR, 2 units of packed red blood cells  UOP: 300 cc      Anesthesia Type:  General     Operative Indications:  Postoperative anemia [D64 9]  S/P hysterectomy [Z90 710]  The patient is a delightful 63-year-old who underwent total laparoscopic hysterectomy, bilateral salpingo-oophorectomy and sentinel bilateral pelvic lymph node dissection earlier today for grade 1 endometrial cancer  Patient was taken back to the operating room for suspected intra-abdominal bleeding      Operative Findings:  1) no signs of active bleeding  2) organized blood clot in the abdomen and pelvis  3) continued observation at low intra-abdominal pressures confirmed no active bleeding  OB/GYN  Progress Notes  POD # 1 Date of Service: 2017  6:20 AM     Assessment:  63 y o  female with biopsy proven grade 1 endometrial cancer POD#1 s/p RA-TLH, BSO, LND and POD#1 from diagnostic laparoscopy for intraabdominal bleeding       Plan:  1  Grade 1 endometrial cancer:   - s/p surgical management  - f/u final pathology  2   Anemia secondary to intraabdominal bleeding  - s/p 4u pRBC intraop  - Hgb 9 1-->7 5-->4U pRBCs-->13 0--> f/u AM CBC  - I&O's, monitor vitals  - Sifuentes in place, producing 122cc/hr, will likely discontinue this AM  3  Sore throat  - Chloraseptic  4  HTN  - Home quinapril held  - BPs overnight 120-140s/60-70s, much improved from yesterday's hypotension  5  Prediabetes  - Home metformin held  - Last fingerstick documented was 1718 yesterday, 328  - Pt will likely require a sliding scale insulin regimen today  6  S/o gastric bypass  - Home supplements held  - No NSAIDs  7  Depression  - Home abilify and effexor  8  Analgesia  - Oxycodone pain scale  9  FEN  - NPO, anticipate advancement of diet as tolerated today  - IVF  10  VTE ppx  - SCDs  11  Disposition  - Anticipate discharge from ICU today and transfer to floor        Subjective/Objective     Chief Complaint:      63 y o  female with biopsy proven grade 1 endometrial cancer POD#1 s/p RA-TLH, BSO, LND and POD#1 from diagnostic laparoscopy for intraabdominal bleeding       Subjective:      Pain: yes, mild and well controlled with current pain regimen  Tolerating PO: NPO overnight, feels thirsty  Able to tolerate PO prior to OR takeback last night  Voiding: Sifuentes in place  Flatus: no  BM: no  Ambulating: yes, OOB to chair this AM  Chest pain: no  Shortness of breath: no  Leg pain: no     Pt reports doing well and feeling well  No additional "funny" feelings overnight  Reports small amount of shoulder discomfort  Abdominal pain is still described as "uncomfortable but not really painful", a 2/10  The pain is worsened with movement  She denies appetite but does feel thirsty      Objective:      Vitals: Blood pressure 125/67, pulse 72, temperature (!) 97 4 °F (36 3 °C), temperature source Oral, resp  rate 17, height 4' 11" (1 499 m), weight 109 kg (240 lb 4 8 oz), SpO2 95 %      Physical Exam:   Gen: AaOx3, NAD, pleasant, sitting in chair  Color is much improved from yesterday    Pulm: No increased work of breathing  Abd: Soft, nontender, nondistended  Incisions are clean, dry, and intact    Extremities: Hands are edematous, lower extremities with 1+ nonpitting edema, nontender, Negative Homer's bilaterally        Lab, Imaging and other studies: I have personally reviewed pertinent reports              Lab Results   Component Value Date     WBC 26 55 (H) 12/19/2017     HGB 13 0 12/19/2017     HCT 37 5 12/19/2017     MCV 87 12/19/2017      12/19/2017

## 2017-12-20 NOTE — ANESTHESIA PREPROCEDURE EVALUATION
Review of Systems/Medical History  Patient summary reviewed        Cardiovascular  EKG reviewed, Negative cardio ROS Exercise tolerance: good,  Hyperlipidemia, Hypertension controlled,    Pulmonary  Negative pulmonary ROS ,        GI/Hepatic  Negative GI/hepatic ROS          Negative  ROS        Endo/Other  Negative endo/other ROS Diabetes well controlled type 2 ,      GYN  Negative gynecology ROS          Hematology  Negative hematology ROS Anemia acute blood loss anemia,     Musculoskeletal  Negative musculoskeletal ROS        Neurology  Negative neurology ROS      Psychology   Negative psychology ROS Depression ,            Physical Exam    Airway    Mallampati score: II  TM Distance: >3 FB  Neck ROM: full     Dental   No notable dental hx     Cardiovascular  Comment: Negative ROS, Rhythm: regular,     Pulmonary  Pulmonary exam normal     Other Findings      Lab Results   Component Value Date    WBC 16 58 (H) 12/19/2017    HGB 7 5 (L) 12/19/2017    HCT 22 8 (L) 12/19/2017    MCV 87 12/19/2017     (H) 12/19/2017     Lab Results   Component Value Date    GLUCOSE 210 (H) 12/19/2017    GLUCOSE 219 (H) 12/19/2017    CALCIUM 8 0 (L) 12/19/2017     12/19/2017    K 4 1 12/19/2017    CO2 22 12/19/2017     12/19/2017    BUN 13 12/19/2017    CREATININE 0 80 12/19/2017     No results found for: INR, PROTIME  No results found for: PTT  Type and Screen:  O          Anesthesia Plan  ASA Score- 3 Emergent      Anesthesia Type- general with ASA Monitors  Additional Monitors:   Airway Plan: ETT  Comment: BRINGBACK CASE FOR ACUTE BLEEDING  Induction- intravenous  Informed Consent- Anesthetic plan and risks discussed with patient  I personally reviewed this patient with the CRNA  Discussed and agreed on the Anesthesia Plan with the CRNA  Kathryn Peraza

## 2017-12-20 NOTE — PLAN OF CARE
Problem: DISCHARGE PLANNING - CARE MANAGEMENT  Goal: Discharge to post-acute care or home with appropriate resources  INTERVENTIONS:  - Conduct assessment to determine patient/family and health care team treatment goals, and need for post-acute services based on payer coverage, community resources, and patient preferences, and barriers to discharge  - Address psychosocial, clinical, and financial barriers to discharge as identified in assessment in conjunction with the patient/family and health care team  - Arrange appropriate level of post-acute services according to patient's   needs and preference and payer coverage in collaboration with the physician and health care team  - Communicate with and update the patient/family, physician, and health care team regarding progress on the discharge plan  - Arrange appropriate transportation to post-acute venues  - When medically stable for d/c, plan to d/c home with mother    Outcome: Progressing

## 2017-12-20 NOTE — PROGRESS NOTES
Pt arrived on unit with report of low blood pressures post op  BP on unit was 68/50 with HR 94  Pt awake, alert, responding appropriately  Denied pain, but complained of dizziness upon turning  Pt had received several liters of fluids after surgery, but had not voided  Imelda Walsh and Kimberly Bains from GYN on unit and aware of pt's condition  Bladder scan showed only 127mL  CBC obtained, and 1 liter of IV fluids run open, per resident  CBC showed that hgb had dropped from 9 1 to 7 5  Order placed to give 4 units of uncrossed unmatched pRBCs  Dr Cheli Dinero on unit, pt to go to OR for ex lap with concern for internal bleeding  Sifuentes cath placed at bedside  One unit of blood  complete and the second started without complications upon transfer to OR  RN accompanied pt to OR  Pt remained awake, alert and responsive

## 2017-12-20 NOTE — PROGRESS NOTES
Late note documentation for 12/19/2017 [id number]----as charge RN I was notified by pts nurse, Lo Carmona, DANISH that pt was coming from [facility]  and she was a RR for low BP's  RN stating "they did get her SBP into 100's, but was down again into 70's, but drs were aware and ok with her coming to floor as med/surg pt " Upon pt arriving to floor pts BP 62/50  RN notified residents Dr Jessica Ortiz and Dr Linette Elder who came to see pt  Orders were given to insert manley, but needed to be latex free, which we immediately called storeroom to order the supplies  They then ordered for pt to receive 2units PRBC- uncrossmatched, b/c the pt didn't have an active T&S prior to surgery, (which needed to be drawn) and "the pt couldn't wait 1/2 for blood to be crossed "  One of the residents went to blood bank and brought back 2units of the uncrossmatched blood  Upon atttempting verification of blood to begin transfusion it was observed that the pts MR # was incorrect on blood form(Incorrect #[id number], correct MR #[id number])  I then called blood bank to inform them of this error, I spoke with Darrin Guerrero, who said "She thought she might have written wrong MR number down " When I asked her what the next step in this would be she said "to discard incorrect blood form and she would send a copy of the blood form with corrected MR# on it "  I proceeded to tell her I didn't feel that was the correct way to address the error and she said that would be fine "  I asked her if that was the true protocol for this situation, she said "no we would normally send it back to blood bank and correct it, but since pt needs it ASAP, its ok " While awaiting for corrected form to come from blood bank, observed 2 additional units of PRBC in pneumatic tube system on ice(unknown for how long they were in tube system),since we were unaware they were sending more units, and again the MR# on those were also incorrect  Dr Jose Ayala had arrived to floor to see pt in meantime  We were Still awaiting latex free supplies to come to floor for manley insertion, upon further communication it was the dr who had the latex allergy, NOT the pt  Which was then inserted by the resident, but could have been inserted by the RN had we known it wasn't the pt with the allergy  While speaking with Dr Cheli Dinero he mentioned the delay and that "there was no urgency " Explained to him the pt has required 1:1 nursing since she arrived to floor with BP in 60's  Dr Rolo Inman "he was unaware it was that low and would have transferred her to ICU if that was the case "  Also made him aware that pt did not have an active T&S and that we had to obtain that first, but also told him of the error with the pts MR#  Pt had already gotten 2units of PRBC and It was decided pt would go to OR immergently, transported by CLARY Carmona RN

## 2017-12-21 VITALS
TEMPERATURE: 98.7 F | HEIGHT: 59 IN | SYSTOLIC BLOOD PRESSURE: 103 MMHG | WEIGHT: 240.3 LBS | BODY MASS INDEX: 48.44 KG/M2 | OXYGEN SATURATION: 92 % | HEART RATE: 82 BPM | RESPIRATION RATE: 18 BRPM | DIASTOLIC BLOOD PRESSURE: 53 MMHG

## 2017-12-21 LAB
BASOPHILS # BLD AUTO: 0.02 THOUSANDS/ΜL (ref 0–0.1)
BASOPHILS NFR BLD AUTO: 0 % (ref 0–1)
EOSINOPHIL # BLD AUTO: 0.02 THOUSAND/ΜL (ref 0–0.61)
EOSINOPHIL NFR BLD AUTO: 0 % (ref 0–6)
ERYTHROCYTE [DISTWIDTH] IN BLOOD BY AUTOMATED COUNT: 13.9 % (ref 11.6–15.1)
ERYTHROCYTE [DISTWIDTH] IN BLOOD BY AUTOMATED COUNT: 14 % (ref 11.6–15.1)
GLUCOSE SERPL-MCNC: 101 MG/DL (ref 65–140)
GLUCOSE SERPL-MCNC: 109 MG/DL (ref 65–140)
HCT VFR BLD AUTO: 29.5 % (ref 34.8–46.1)
HCT VFR BLD AUTO: 31.4 % (ref 34.8–46.1)
HGB BLD-MCNC: 10.3 G/DL (ref 11.5–15.4)
HGB BLD-MCNC: 10.7 G/DL (ref 11.5–15.4)
LYMPHOCYTES # BLD AUTO: 1.66 THOUSANDS/ΜL (ref 0.6–4.47)
LYMPHOCYTES NFR BLD AUTO: 13 % (ref 14–44)
MCH RBC QN AUTO: 29.6 PG (ref 26.8–34.3)
MCH RBC QN AUTO: 29.9 PG (ref 26.8–34.3)
MCHC RBC AUTO-ENTMCNC: 34.1 G/DL (ref 31.4–37.4)
MCHC RBC AUTO-ENTMCNC: 34.9 G/DL (ref 31.4–37.4)
MCV RBC AUTO: 86 FL (ref 82–98)
MCV RBC AUTO: 87 FL (ref 82–98)
MONOCYTES # BLD AUTO: 1.7 THOUSAND/ΜL (ref 0.17–1.22)
MONOCYTES NFR BLD AUTO: 13 % (ref 4–12)
NEUTROPHILS # BLD AUTO: 9.78 THOUSANDS/ΜL (ref 1.85–7.62)
NEUTS SEG NFR BLD AUTO: 74 % (ref 43–75)
NRBC BLD AUTO-RTO: 0 /100 WBCS
PLATELET # BLD AUTO: 196 THOUSANDS/UL (ref 149–390)
PLATELET # BLD AUTO: 218 THOUSANDS/UL (ref 149–390)
PMV BLD AUTO: 10.1 FL (ref 8.9–12.7)
PMV BLD AUTO: 10.8 FL (ref 8.9–12.7)
RBC # BLD AUTO: 3.44 MILLION/UL (ref 3.81–5.12)
RBC # BLD AUTO: 3.62 MILLION/UL (ref 3.81–5.12)
WBC # BLD AUTO: 13.26 THOUSAND/UL (ref 4.31–10.16)
WBC # BLD AUTO: 14.34 THOUSAND/UL (ref 4.31–10.16)

## 2017-12-21 PROCEDURE — 85027 COMPLETE CBC AUTOMATED: CPT | Performed by: OBSTETRICS & GYNECOLOGY

## 2017-12-21 PROCEDURE — 82948 REAGENT STRIP/BLOOD GLUCOSE: CPT

## 2017-12-21 PROCEDURE — 85025 COMPLETE CBC W/AUTO DIFF WBC: CPT | Performed by: OBSTETRICS & GYNECOLOGY

## 2017-12-21 RX ADMIN — GEMFIBROZIL 600 MG: 600 TABLET ORAL at 06:54

## 2017-12-21 RX ADMIN — OXYCODONE HYDROCHLORIDE 5 MG: 5 TABLET ORAL at 04:09

## 2017-12-21 RX ADMIN — VENLAFAXINE 75 MG: 37.5 TABLET ORAL at 08:12

## 2017-12-21 RX ADMIN — VITAMIN D, TAB 1000IU (100/BT) 1000 UNITS: 25 TAB at 08:12

## 2017-12-21 RX ADMIN — ARIPIPRAZOLE 10 MG: 10 TABLET ORAL at 08:11

## 2017-12-21 RX ADMIN — ASPIRIN 81 MG: 81 TABLET, COATED ORAL at 08:12

## 2017-12-21 NOTE — PLAN OF CARE
Problem: PAIN - ADULT  Goal: Verbalizes/displays adequate comfort level or baseline comfort level  Interventions:  - Encourage patient to monitor pain and request assistance  - Assess pain using appropriate pain scale  - Administer analgesics based on type and severity of pain and evaluate response  - Implement non-pharmacological measures as appropriate and evaluate response  - Consider cultural and social influences on pain and pain management  - Notify physician/advanced practitioner if interventions unsuccessful or patient reports new pain   Outcome: Progressing      Problem: INFECTION - ADULT  Goal: Absence or prevention of progression during hospitalization  INTERVENTIONS:  - Assess and monitor for signs and symptoms of infection  - Monitor lab/diagnostic results  - Monitor all insertion sites, i e  indwelling lines, tubes, and drains  - Monitor endotracheal (as able) and nasal secretions for changes in amount and color  - [place] appropriate cooling/warming therapies per order  - Administer medications as ordered  - Instruct and encourage patient and family to use good hand hygiene technique  - Identify and instruct in appropriate isolation precautions for identified infection/condition   Outcome: Progressing    Goal: Absence of fever/infection during neutropenic period  INTERVENTIONS:  - Monitor WBC  - Implement neutropenic guidelines   Outcome: Progressing      Problem: SAFETY ADULT  Goal: Patient will remain free of falls  INTERVENTIONS:  - Assess patient frequently for physical needs  -  Identify cognitive and physical deficits and behaviors that affect risk of falls    -  [place] fall precautions as indicated by assessment   - Educate patient/family on patient safety including physical limitations  - Instruct patient to call for assistance with activity based on assessment  - Modify environment to reduce risk of injury  - Consider OT/PT consult to assist with strengthening/mobility    Outcome: Progressing    Goal: Maintain or return to baseline ADL function  INTERVENTIONS:  -  Assess patient's ability to carry out ADLs; assess patient's baseline for ADL function and identify physical deficits which impact ability to perform ADLs (bathing, care of mouth/teeth, toileting, grooming, dressing, etc )  - Assess/evaluate cause of self-care deficits   - Assess range of motion  - Assess patient's mobility; develop plan if impaired  - Assess patient's need for assistive devices and provide as appropriate  - Encourage maximum independence but intervene and supervise when necessary  ¯ Involve family in performance of ADLs  ¯ Assess for home care needs following discharge   ¯ Request OT consult to assist with ADL evaluation and planning for discharge  ¯ Provide patient education as appropriate   Outcome: Progressing    Goal: Maintain or return mobility status to optimal level  INTERVENTIONS:  - Assess patient's baseline mobility status (ambulation, transfers, stairs, etc )    - Identify cognitive and physical deficits and behaviors that affect mobility  - Identify mobility aids required to assist with transfers and/or ambulation (gait belt, sit-to-stand, lift, walker, cane, etc )  - [place] fall precautions as indicated by assessment  - Record patient progress and toleration of activity level on Mobility SBAR; progress patient to next Phase/Stage  - Instruct patient to call for assistance with activity based on assessment  - Request Rehabilitation consult to assist with strengthening/weightbearing, etc    Outcome: Progressing      Problem: DISCHARGE PLANNING  Goal: Discharge to home or other facility with appropriate resources  INTERVENTIONS:  - Identify barriers to discharge w/patient and caregiver  - Arrange for needed discharge resources and transportation as appropriate  - Identify discharge learning needs (meds, wound care, etc )  - Arrange for interpretive services to assist at discharge as needed  - Refer to Case Management Department for coordinating discharge planning if the patient needs post-hospital services based on physician/advanced practitioner order or complex needs related to functional status, cognitive ability, or social support system   Outcome: Progressing      Problem: Knowledge Deficit  Goal: Patient/family/caregiver demonstrates understanding of disease process, treatment plan, medications, and discharge instructions  Complete learning assessment and assess knowledge base  Interventions:  - Provide teaching at level of understanding  - Provide teaching via preferred learning methods   Outcome: Progressing      Problem: Potential for Falls  Goal: Patient will remain free of falls  INTERVENTIONS:  - Assess patient frequently for physical needs  -  Identify cognitive and physical deficits and behaviors that affect risk of falls  -  [place] fall precautions as indicated by assessment   - Educate patient/family on patient safety including physical limitations  - Instruct patient to call for assistance with activity based on assessment  - Modify environment to reduce risk of injury  - Consider OT/PT consult to assist with strengthening/mobility    Outcome: Progressing      Problem: Nutrition/Hydration-ADULT  Goal: Nutrient/Hydration intake appropriate for improving, restoring or maintaining nutritional needs  Monitor and assess patient's nutrition/hydration status for malnutrition (ex- brittle hair, bruises, dry skin, pale skin and conjunctiva, muscle wasting, smooth red tongue, and disorientation)  Collaborate with interdisciplinary team and initiate plan and interventions as ordered  Monitor patient's weight and dietary intake as ordered or per policy  Utilize nutrition screening tool and intervene per policy  Determine patient's food preferences and provide high-protein, high-caloric foods as appropriate       INTERVENTIONS:  - Monitor oral intake, urinary output, labs, and treatment plans  - Assess nutrition and hydration status and recommend course of action  - Evaluate amount of meals eaten  - Assist patient with eating if necessary   - Allow adequate time for meals  - Recommend/ encourage appropriate diets, oral nutritional supplements, and vitamin/mineral supplements  - Order, calculate, and assess calorie counts as needed  - Recommend, monitor, and adjust tube feedings and TPN/PPN based on assessed needs  - Assess need for intravenous fluids  - Provide specific nutrition/hydration education as appropriate  - Include patient/family/caregiver in decisions related to nutrition   Outcome: Progressing      Problem: DISCHARGE PLANNING - CARE MANAGEMENT  Goal: Discharge to post-acute care or home with appropriate resources  INTERVENTIONS:  - Conduct assessment to determine patient/family and health care team treatment goals, and need for post-acute services based on payer coverage, community resources, and patient preferences, and barriers to discharge  - Address psychosocial, clinical, and financial barriers to discharge as identified in assessment in conjunction with the patient/family and health care team  - Arrange appropriate level of post-acute services according to patient's   needs and preference and payer coverage in collaboration with the physician and health care team  - Communicate with and update the patient/family, physician, and health care team regarding progress on the discharge plan  - Arrange appropriate transportation to post-acute venues  - When medically stable for d/c, plan to d/c home with mother     Outcome: Progressing

## 2017-12-21 NOTE — PROGRESS NOTES
Called to bedside by RN for pt report of sharp-shooting abdominal pains  Patient notes that she was sleeping and was suddenly woken from sleep with pains  Occurring in random spots over her abdomen, brief each time and then resolve on their own  She notes tenderness over her right-most trocar site, but otherwise her pain is overall well controlled  She declined analgesics when offered by RN, noting that this pain was random and not constant  Denies associated fever/chills, dizziness, cp, palpitations, sob, n/v/d  Temp:  [97 7 °F (36 5 °C)-99 1 °F (37 3 °C)] 98 5 °F (36 9 °C)  HR:  [62-92] 81  Resp:  [17-36] 18  BP: (113-147)/(59-80) 113/59    Physical Exam  GEN: sleeping on my arrival, NAD  Pulm: breathing comfortably, no accessory muscle use  Abd: soft, NTND, no tympany to percussion, trocar sites clean/dry/intact and without erythema or hematoma  Extrem: non-tender    A/P: 63 y o  female with biopsy proven grade 1 endometrial cancer POD#1 s/p RA-TLH, BSO, LND and POD#1 from diagnostic laparoscopy for intraabdominal bleeding  Pain description most consistent with gas pains  Her exam is overall benign and does not suggest significant distention, but mild gas discomfort may be exacerbated by her intraabdominal bleeding (ie  Peritoneal irritation from recent bleed)  Will administer simethicone for symptomatic relief and continue to observe      Blease MD Killian  12/20/17  11:11 PM

## 2017-12-21 NOTE — PROGRESS NOTES
Gyn Oncology Progress note   Ankush Cortes 63 y o  female MRN: [id number]  Unit/Bed#: [facility] 254-58 Encounter: [id number]    Ankush Cortes has no current complaints  Pain is occasional sharp shooting abdominal pain, differing locations, occurrence when moves, voids  Patient is currently voiding  She is ambulating  Patient is currently passing flatus and has had no bowel movement  She is tolerating PO, and denies nausea or vomitting  Patient denies fever, chills, chest pain, shortness of breath, or calf tenderness  /59   Pulse 81   Temp 98 5 °F (36 9 °C) (Oral)   Resp 18   Ht 4' 11" (1 499 m)   Wt 109 kg (240 lb 4 8 oz)   SpO2 (!) 89% Comment: nurse made aware  BMI 48 53 kg/m²     I/O last 3 completed shifts: In: 72453 0 [P O :120; I V :9868 3; Blood:700]  Out: 3825 [Urine:3125; Blood:700]  I/O this shift:  In: -   Out: 1500 [Urine:1500]    Lab Results   Component Value Date    WBC 17 05 (H) 12/20/2017    HGB 12 0 12/20/2017    HCT 33 8 (L) 12/20/2017    MCV 85 12/20/2017     12/20/2017       Lab Results   Component Value Date    GLUCOSE 153 (H) 12/19/2017    CALCIUM 7 3 (L) 12/19/2017     12/19/2017    K 4 1 12/19/2017    CO2 22 12/19/2017     12/19/2017    BUN 14 12/19/2017    CREATININE 0 61 12/19/2017       Lab Results   Component Value Date/Time    POCGLU 123 12/20/2017 09:09 PM    POCGLU 93 12/20/2017 04:08 PM    POCGLU 138 12/20/2017 11:22 AM    POCGLU 118 12/20/2017 06:39 AM    POCGLU 328 (H) 12/19/2017 05:18 PM       Physical Exam  Gen: AAOx3, NAD, comfortable in bed  CVS: S1S2+, RRR, no murmurs  Lungs: CTA b/l normal respiratory effort and rate  Abdomen: soft, non tender, incisions C/D/I, nondistended, +BS  Extremities: SCDs on and on, non tender    A/P: 63 y o  POD#2 s/p RATLH/BSO, pelvic LND followed by diagnostic laparoscopy for evacuation of hemoperitoneum for postoperative intrabdominal bleeding  Hemodynamically stable    1  Grade 1 endometrial cancer:   - s/p surgical management, f/u final pathology  2  Anemia secondary to intraabdominal bleeding  - s/p 4u pRBC intraop & evacuation of hemoperitoneum  - Hgb 9 1-->7 5-->4U pRBCs-->13 0-->12 0-->f/u am CBC  - I&O's, hemodynamically stable  3  HTN  - Home quinapril held, hypotension has resolved  5  Prediabetes  - Home metformin held, SSI#3  -accuchecks overnight 93/123  6  S/o gastric bypass  - Home supplements held  - No NSAIDs  7  Depression  - Home abilify and effexor  8  Analgesia  - Oxycodone pain scale  9  FEN  -reg diet  10  VTE ppx  - SCD's, Lovenox  11   Disposition  - Anticipate discharge home today    Vikas Snowden MD - PGY-III  12/21/2017  6:19 AM

## 2017-12-22 LAB
ABO GROUP BLD BPU: NORMAL
BPU ID: NORMAL
CROSSMATCH: NORMAL
UNIT DISPENSE STATUS: NORMAL
UNIT PRODUCT CODE: NORMAL
UNIT RH: NORMAL

## 2017-12-22 NOTE — CASE MANAGEMENT
Notification of Discharge  This is a Notification of Discharge from our facility [address]  Please be advised that this patient has been discharge from our facility  Below you will find the admission and discharge date and time including the patients disposition  PRESENTATION DATE: 12/19/2017  7:21 AM  IP ADMISSION DATE: 12/19/17 1545  DISCHARGE DATE: 12/21/2017  2:43 PM  DISPOSITION: [address] [place] [address] in the [facility] for 2017  Network Utilization Review Department  Phone: [phone]; Fax [phone]  ATTENTION: The Network Utilization Review Department is now centralized for our 7 Facilities  Make a note that we have a new phone and fax numbers for our Department  Please call with any questions or concerns to [phone] and carefully follow the prompts so that you are directed to the right person  All voicemails are confidential  Fax any determinations, approvals, denials, and requests for initial or continue stay review clinical to [phone]  Due to HIGH CALL volume, it would be easier if you could please send faxed requests to expedite your requests and in part, help us provide discharge notifications faster

## 2017-12-29 ENCOUNTER — ALLSCRIPTS OFFICE VISIT (OUTPATIENT)
Dept: OTHER | Facility: OTHER | Age: 62
End: 2017-12-29

## 2018-01-09 ENCOUNTER — GENERIC CONVERSION - ENCOUNTER (OUTPATIENT)
Dept: OTHER | Facility: OTHER | Age: 63
End: 2018-01-09

## 2018-01-09 NOTE — PROGRESS NOTES
Message  patient to be halted  no contact from patient or psychiatrist  e-mail sent to staff  Active Problems    1  Abnormal weight gain (783 1) (R63 5)   2  Benign essential hypertension (401 1) (I10)   3  Change in bowel habits (787 99) (R19 4)   4  Depression (311) (F32 9)   5  Dyslipidemia (272 4) (E78 5)   6  Morbid or severe obesity due to excess calories (278 01) (E66 01)   7  Prediabetes (790 29) (R73 09)   8  S/P bariatric surgery (V45 86) (Z98 84)   9  Vitamin D deficiency (268 9) (E55 9)   10  Well adult on routine health check (V70 0) (Z00 00)    Current Meds   1  Abilify 5 MG Oral Tablet (ARIPiprazole); one tab day; Therapy: 18REA0759 to Recorded   2  Aspirin EC 81 MG Oral Tablet Delayed Release; Therapy: 87ZXV9264 to Recorded   3  Biotin TABS; Therapy: (Recorded:20Puo6447) to Recorded   4  Famotidine 20 MG Oral Tablet; Therapy: 54UIX3753 to Recorded   5  Gemfibrozil 600 MG Oral Tablet; TAKE 1 TABLET DAILY; Therapy: 49NCW9303 to (Evaluate:97Jqg3358)  Requested for: 98HVZ7220; Last   Rx:34Wap2011 Ordered   6  Iron 28 MG Oral Tablet; Therapy: (Recorded:07Kwn6557) to Recorded   7  MetFORMIN HCl - 500 MG Oral Tablet; once daily; Therapy: 78IXQ5741 to (Last Dormai Carbon)  Requested for: 69OUL5586 Ordered   8  Nuvigil 250 MG Oral Tablet; daily; Therapy: 70Oas2392 to Recorded   9  Quinapril HCl - 5 MG Oral Tablet; take 1 tablet by mouth every day; Therapy: 41KSW5687 to (Evaluate:98Npc2209)  Requested for: 99RIL8674; Last   Rx:45Nnn1707 Ordered   10  Venlafaxine HCl ER 75 MG Oral Capsule Extended Release 24 Hour; 3 tabs/day; Therapy: 41WYR7020 to Recorded   11  Vitamin D 1000 UNIT CAPS; 2 tabs daily; Therapy: 48QLX7896 to Recorded   12  Vitamin D 2000 UNIT Oral Tablet; Therapy: (Recorded:44Sug6935) to Recorded    Allergies    1  No Known Drug Allergies    Signatures   Electronically signed by :  Pieter Burks LCSWMSLETTY,UDAY; Mar 10 2016  2:44PM EST (Author)

## 2018-01-10 NOTE — RESULT NOTES
Verified Results  (LC) PapIG, HPV, rfx 16/18 38GCZ1063 09:28AM DuniajoannMarline snider   No  of containers  Vipul Side 01 ThinPrep Vial     Test Name Result Flag Reference   DIAGNOSIS:      NEGATIVE FOR INTRAEPITHELIAL LESION AND MALIGNANCY  NEGATIVE FOR INTRAEPITHELIAL LESION AND MALIGNANCY  Specimen adequacy:      Satisfactory for evaluation  Endocervical and/or squamous metaplastic  cells (endocervical component) are present  Satisfactory for evaluation  Endocervical and/or squamous metaplastic  cells (endocervical component) are present  Clinician provided ICD10: U13 041     Z01 419   Performed by:      Layla Mancilla Cytotechnologist (ASCP)   Layla Mancilla Cytotechnologist (ASCP)   Comm   Note: (Report)     The Pap smear is a screening test designed to aid in the detection of  premalignant and malignant conditions of the uterine cervix  It is not a  diagnostic procedure and should not be used as the sole means of detecting  cervical cancer  Both false-positive and false-negative reports do occur  The Pap smear is a screening test designed to aid in the detection of  premalignant and malignant conditions of the uterine cervix  It is not a  diagnostic procedure and should not be used as the sole means of detecting  cervical cancer  Both false-positive and false-negative reports do occur  Test Methodology: This liquid based ThinPrep(R) pap test was screened with the  use of an image guided system  This liquid based ThinPrep(R) pap test was screened with the  use of an image guided system  HPV, high-risk Negative  Negative   This high-risk HPV test detects thirteen high-risk types  (16/18/31/33/35/39/45/51/52/56/58/59/68) without differentiation

## 2018-01-12 VITALS
RESPIRATION RATE: 16 BRPM | DIASTOLIC BLOOD PRESSURE: 78 MMHG | OXYGEN SATURATION: 99 % | HEART RATE: 86 BPM | BODY MASS INDEX: 46.08 KG/M2 | SYSTOLIC BLOOD PRESSURE: 114 MMHG | HEIGHT: 59 IN | TEMPERATURE: 97.4 F | WEIGHT: 228.56 LBS

## 2018-01-12 VITALS
DIASTOLIC BLOOD PRESSURE: 82 MMHG | BODY MASS INDEX: 46.68 KG/M2 | SYSTOLIC BLOOD PRESSURE: 130 MMHG | HEIGHT: 58 IN | WEIGHT: 222.38 LBS

## 2018-01-12 NOTE — MISCELLANEOUS
Dear Valentine Beard,  I am happy to report that your Pap test collected during your recent exam  was normal  The HPV test was negative  Based on the most recent guidelines, you will be due for your next Pap test and HPV testing in 3 years  However, I will continue to see you annually for your Well Women visit  If you have any questions, please  do not hesitate to contact my office      Sincerely,    Geovany Sher MD

## 2018-01-13 VITALS
TEMPERATURE: 97.6 F | HEIGHT: 59 IN | DIASTOLIC BLOOD PRESSURE: 76 MMHG | OXYGEN SATURATION: 98 % | BODY MASS INDEX: 45.79 KG/M2 | RESPIRATION RATE: 16 BRPM | SYSTOLIC BLOOD PRESSURE: 112 MMHG | WEIGHT: 227.13 LBS | HEART RATE: 80 BPM

## 2018-01-13 VITALS
SYSTOLIC BLOOD PRESSURE: 118 MMHG | DIASTOLIC BLOOD PRESSURE: 80 MMHG | HEIGHT: 59 IN | WEIGHT: 217.25 LBS | OXYGEN SATURATION: 96 % | HEART RATE: 73 BPM | RESPIRATION RATE: 16 BRPM | BODY MASS INDEX: 43.8 KG/M2 | TEMPERATURE: 97.2 F

## 2018-01-14 VITALS
HEART RATE: 68 BPM | SYSTOLIC BLOOD PRESSURE: 138 MMHG | DIASTOLIC BLOOD PRESSURE: 76 MMHG | HEIGHT: 59 IN | WEIGHT: 224 LBS | BODY MASS INDEX: 45.16 KG/M2

## 2018-01-15 VITALS
WEIGHT: 225.13 LBS | HEIGHT: 59 IN | BODY MASS INDEX: 45.39 KG/M2 | SYSTOLIC BLOOD PRESSURE: 108 MMHG | HEART RATE: 86 BPM | DIASTOLIC BLOOD PRESSURE: 88 MMHG

## 2018-01-22 VITALS
OXYGEN SATURATION: 97 % | RESPIRATION RATE: 16 BRPM | SYSTOLIC BLOOD PRESSURE: 102 MMHG | WEIGHT: 226 LBS | TEMPERATURE: 98.2 F | HEART RATE: 89 BPM | BODY MASS INDEX: 45.56 KG/M2 | HEIGHT: 59 IN | DIASTOLIC BLOOD PRESSURE: 68 MMHG

## 2018-01-22 VITALS
SYSTOLIC BLOOD PRESSURE: 104 MMHG | BODY MASS INDEX: 46.84 KG/M2 | HEART RATE: 76 BPM | HEIGHT: 58 IN | RESPIRATION RATE: 16 BRPM | DIASTOLIC BLOOD PRESSURE: 68 MMHG | TEMPERATURE: 97.9 F | WEIGHT: 223.13 LBS | OXYGEN SATURATION: 98 %

## 2018-01-22 VITALS
HEART RATE: 72 BPM | BODY MASS INDEX: 47.24 KG/M2 | DIASTOLIC BLOOD PRESSURE: 78 MMHG | HEIGHT: 58 IN | RESPIRATION RATE: 16 BRPM | TEMPERATURE: 98.4 F | WEIGHT: 225.06 LBS | SYSTOLIC BLOOD PRESSURE: 132 MMHG

## 2018-01-22 VITALS
WEIGHT: 225.25 LBS | HEART RATE: 80 BPM | SYSTOLIC BLOOD PRESSURE: 128 MMHG | RESPIRATION RATE: 20 BRPM | HEIGHT: 58 IN | DIASTOLIC BLOOD PRESSURE: 84 MMHG | BODY MASS INDEX: 47.28 KG/M2

## 2018-01-23 NOTE — MISCELLANEOUS
Assessment    1  Endometrial cancer (182 0) (C54 1)   2  Benign essential hypertension (401 1) (I10)   3  Obesity (278 00) (E66 9)   4  Other abnormal glucose (790 29) (R73 09)   5  Anemia (285 9) (D64 9)   6  Dyslipidemia (272 4) (E78 5)    Plan  Anemia    · (1) CBC/PLT/DIFF; Status:Active; Requested for:99Una5671;    Perform:[facility]; Due:73Ubc7201; Ordered; [place]ifer Padgettn; Ordered By:Meche Padilla;   · (1) COMPREHENSIVE METABOLIC PANEL; Status:Active; Requested for:99Cpd0353;    Perform:[facility]; Due:30Fln5004; Ordered; Paulina Canavan; Ordered By:Aurea Padilla;    Discussion/Summary  Discussion Summary:   She will f/u with surgery  will check lab in 2-4 weeks  she remains on 1 iron pill/day 2nd to her gastric bypass  Counseling Documentation With Imm: The patient was counseled regarding impressions  Medication SE Review and Pt Understands Tx: Possible side effects of new medications were reviewed with the patient/guardian today  The treatment plan was reviewed with the patient/guardian  The patient/guardian understands and agrees with the treatment plan      Chief Complaint  Chief Complaint Free Text Note Form: patient presented here for follow up from hospital      History of Present Illness  TCM Communication [address] [place] [address] [address]: The patient is being contacted for follow-up after hospitalization  She was hospitalized at [facility]  The date of admission: 12/19/2017, date of discharge: 12/21/2017  Diagnosis: HYSTERCTOMY tigist/bso  She was discharged to home  Medications reviewed and updated today  She scheduled a follow up appointment  Symptoms: fatigue, but no fever, no anorexia, no nausea, no vomiting, no loose stools, no constipation, no pain with urinating, no incisional pain and no wound drainage  Counseling was provided to the patient  Topics counseled included importance of compliance with treatment     Communication performed and completed by Matilda Galvan   HPI: pt had tigist/bso for grade 1 endometrial adenocarcinoma  she received post op 4 units prbc's for hgt 7 5 and had lap for evacuation of 500cc bld  no active blding  Review of Systems  Complete-Female:   Constitutional: No fever, no chills, feels well, no tiredness, no recent weight gain or weight loss  ENT: no complaints of earache, no loss of hearing, no nose bleeds, no nasal discharge, no sore throat, no hoarseness  Cardiovascular: No complaints of slow heart rate, no fast heart rate, no chest pain, no palpitations, no leg claudication, no lower extremity edema  Respiratory: No complaints of shortness of breath, no wheezing, no cough, no SOB on exertion, no orthopnea, no PND  Gastrointestinal: No complaints of abdominal pain, no constipation, no nausea or vomiting, no diarrhea, no bloody stools  Active Problems    1  Benign essential hypertension (401 1) (I10)   2  Colonoscopy (Fiberoptic) Screening   3  Depression (311) (F32 9)   4  Dyslipidemia (272 4) (E78 5)   5  Dysuria (788 1) (R30 0)   6  Endometrial cancer (182 0) (C54 1)   7  Obesity (278 00) (E66 9)   8  Other abnormal glucose (790 29) (R73 09)   9  Overactive bladder (596 51) (N32 81)   10  Postmenopause bleeding (627 1) (N95 0)   11  Prediabetes (790 29) (R73 03)   12  Vitamin D deficiency (268 9) (E55 9)    Past Medical History    1  History of Bacterial vaginosis (616 10,041 9) (N76 0,B96 89)   2  History of Candidiasis (112 9) (B37 9)   3  History of Change in bowel habits (787 99) (R19 4)   4  Depression (311) (F32 9)   5  History of Encounter for screening for malignant neoplasm of colon (V76 51) (Z12 11)   6  History of Encounter for well woman exam with routine gynecological exam (V72 31)   (Z01 419)   7  History of candidiasis (V12 09) (Z86 19)   8  History of hyperglycemia (V12 29) (Z86 39)   9  History of obesity (V12 29) (Z86 39)   10  History of screening mammography (V15 89) (Z92 89)   11  Denied: History of substance abuse   12   History of Morbid or severe obesity due to excess calories (278 01) (E66 01)   13  History of S/P bariatric surgery (V45 86) (Z98 84)   14  History of Screening for osteoporosis (V82 81) (Z13 820)   15  History of Symptoms of urinary tract infection (788 99) (R39 9)   16  History of Well adult on routine health check (V70 0) (Z00 00)    Surgical History    1  History of Cholecystectomy   2  History of Gastric Surgery For Morbid Obesity   3  History of Tonsillectomy   4  History of Total Abdominal Hysterectomy With Bilateral Salpingo-Oophorectomy  Surgical History Reviewed: The surgical history was reviewed and updated today  Family History  Mother    1  Family history of Family Health Status Of Mother - Alive   2  Family history of malignant neoplasm of ovary (V16 41) (Z80 41)  Father    3  Family history of Hodgkin's lymphoma (V16 7) (Z80 7)   4  Family history of Father  At Age 80  Maternal Grandfather    5  Family history of malignant neoplasm of bone (V16 8) (Z80 8)  Family History    6  Denied: Family history of depression   7  Denied: Family history of substance abuse  Family History Reviewed: The family history was reviewed and updated today  Social History    · Denied: History of Drug use   · Never A Smoker   · Never Drank Alcohol   · Occupation  Social History Reviewed: The social history was reviewed and updated today  The social history was reviewed and is unchanged  Current Meds   1  Abilify 10 MG Oral Tablet; TAKE 1 TABLET DAILY; Therapy: 35IEC9136 to Recorded   2  ARIPiprazole 10 MG Oral Tablet; Therapy: 63JEZ6064 to Recorded   3  Aspirin EC 81 MG Oral Tablet Delayed Release; Therapy: 52VOT0805 to Recorded   4  Biotin TABS; Therapy: (8868-8198620) to Recorded   5  Gemfibrozil 600 MG Oral Tablet; TAKE 1 TABLET DAILY; Therapy: 88NIE1648 to (Evaluate:42Ayv0644)  Requested for: 51XUG4389; Last   Rx:53Cex6274 Ordered   6  Iron 28 MG Oral Tablet;    Therapy: (696424642) to Recorded 7  MetFORMIN HCl - 500 MG Oral Tablet; once daily; Therapy: 46GPR1196 to (Last Rx:91Alo0436)  Requested for: 43YHP6263 Ordered   8  Mydayis 50 MG Oral Capsule Extended Release 24 Hour; take 1 capsule daily; Therapy: (Recorded:86Iea0517) to Recorded   9  Mydayis 50 MG Oral Capsule Extended Release 24 Hour; Therapy: 73ELP5620 to Recorded   10  Myrbetriq 50 MG Oral Tablet Extended Release 24 Hour; Take 1 tablet daily; Therapy: 41XQA7483 to (Marylou Coffee)  Requested for: 94Bml3271; Last    Rx:75Xsu9372 Ordered   11  Oxybutynin Chloride ER 10 MG Oral Tablet Extended Release 24 Hour; Take 1 tablet    daily; Therapy: 80XSG7394 to (Evaluate:03Nov2018); Last Rx:08Nov2017 Ordered   12  Quinapril HCl - 5 MG Oral Tablet; take 1 tablet by mouth every day; Therapy: 42XKZ7810 to (Marylou Coffee)  Requested for: 25LUP7353; Last    Rx:68Zto4219 Ordered   13  Venlafaxine HCl ER 75 MG Oral Capsule Extended Release 24 Hour; 3 tabs/day; Therapy: 65TFS0343 to Recorded   14  Vitamin D 1000 UNIT CAPS; 2 tabs daily; Therapy: 65MKM2466 to Recorded  Medication List Reviewed: The medication list was reviewed and updated today  Allergies    1  No Known Drug Allergies    2  No Known Environmental Allergies   3  No Known Food Allergies    Vitals  Signs   Recorded: 53ERH9407 09:21AM   Temperature: 97 9 F, Tympanic  Heart Rate: 76  Pulse Quality: Normal  Respiration Quality: Normal  Respiration: 16  Systolic: 533, LUE, Sitting  Diastolic: 68, LUE, Sitting  Height: 4 ft 10 25 in  Weight: 223 lb 2 oz  BMI Calculated: 46 23  BSA Calculated: 1 91  O2 Saturation: 98    Physical Exam    Constitutional   General appearance: No acute distress, well appearing and well nourished  Eyes   Conjunctiva and lids: No swelling, erythema or discharge  Pupils and irises: Equal, round and reactive to light      Ears, Nose, Mouth, and Throat   External inspection of ears and nose: Normal     Otoscopic examination: Tympanic membranes translucent with normal light reflex  Canals patent without erythema  Nasal mucosa, septum, and turbinates: Normal without edema or erythema  Oropharynx: Normal with no erythema, edema, exudate or lesions  Pulmonary   Respiratory effort: No increased work of breathing or signs of respiratory distress  Auscultation of lungs: Clear to auscultation  Cardiovascular   Auscultation of heart: Normal rate and rhythm, normal S1 and S2, without murmurs  Examination of extremities for edema and/or varicosities: Normal     Abdomen   Abdomen: Non-tender, no masses  Liver and spleen: No hepatomegaly or splenomegaly  Lymphatic   Palpation of lymph nodes in neck: No lymphadenopathy           Future Appointments    Date/Time Provider Specialty Site   03/10/2018 08:00 AM Stanley Celaya DO Internal Medicine [facility]   05/08/2018 02:45 PM Harsh Cool [facility]     Signatures   Electronically signed by : Darrin Colón DO; Dec 29 2017 12:13PM EST                       (Author)

## 2018-01-24 VITALS
DIASTOLIC BLOOD PRESSURE: 74 MMHG | TEMPERATURE: 98.1 F | RESPIRATION RATE: 16 BRPM | SYSTOLIC BLOOD PRESSURE: 116 MMHG | BODY MASS INDEX: 45.62 KG/M2 | HEART RATE: 92 BPM | OXYGEN SATURATION: 97 % | WEIGHT: 217.31 LBS | HEIGHT: 58 IN

## 2018-02-13 ENCOUNTER — OFFICE VISIT (OUTPATIENT)
Dept: GYNECOLOGIC ONCOLOGY | Facility: CLINIC | Age: 63
End: 2018-02-13

## 2018-02-13 VITALS
RESPIRATION RATE: 18 BRPM | TEMPERATURE: 98.2 F | DIASTOLIC BLOOD PRESSURE: 70 MMHG | HEART RATE: 70 BPM | SYSTOLIC BLOOD PRESSURE: 112 MMHG | HEIGHT: 59 IN | BODY MASS INDEX: 45.4 KG/M2 | WEIGHT: 225.2 LBS

## 2018-02-13 DIAGNOSIS — C54.1 ENDOMETRIAL CANCER (HCC): Primary | ICD-10-CM

## 2018-02-13 PROCEDURE — 99024 POSTOP FOLLOW-UP VISIT: CPT | Performed by: OBSTETRICS & GYNECOLOGY

## 2018-02-13 RX ORDER — ARIPIPRAZOLE 10 MG/1
1 TABLET ORAL DAILY
COMMUNITY
Start: 2017-12-29 | End: 2018-07-25 | Stop reason: SDUPTHER

## 2018-02-13 NOTE — PROGRESS NOTES
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  Patient underwent her initial surgery on November 17, 2017  Patient comes in today for her 2nd postoperative visit with no complaints  She denies vaginal bleeding of any kind  Endometrial cancer ([facility] )    11/17/2017 Initial Diagnosis     Endometrial cancer ([facility] )         12/19/2017 Surgery     Robotic assisted total laparoscopic hysterectomy with bilateral salpingo-oophorectomy and sentinel bilateral pelvic lymph node dissection  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 (4 4 x 3 2 cm tumor, 9 4/15 4mm invasion, NO LVSI, washings revealed atypical cellular changes)            Review of Systems    Patient Active Problem List   Diagnosis    S/P hysterectomy    Benign essential hypertension    Depression    Dyslipidemia    Endometrial cancer (HCC)    Obesity    Other abnormal glucose    Overactive bladder    Prediabetes    Vitamin D deficiency    Postoperative anemia     Social History     Social History    Marital status: Single     Spouse name: N/A    Number of children: N/A    Years of education: N/A     Occupational History    Not on file       Social History Main Topics    Smoking status: Never Smoker    Smokeless tobacco: Never Used    Alcohol use No    Drug use: No    Sexual activity: Not on file     Other Topics Concern    Not on file     Social History Narrative    No narrative on file     Past Medical History:   Diagnosis Date    Anemia     Depression     Diabetes mellitus ([facility] )     Hyperlipidemia     Hypertension        Current Outpatient Prescriptions:    Amphet-Dextroamphet 3-Bead ER (MYDAYIS) 50 MG CP24, Take by mouth, Disp: , Rfl:     ARIPiprazole (ABILIFY) 10 mg tablet, Take 10 mg by mouth daily, Disp: , Rfl:     ARIPiprazole (ABILIFY) 10 mg tablet, Take 1 tablet by mouth daily, Disp: , Rfl:     aspirin (ECOTRIN LOW STRENGTH) 81 mg EC tablet, Take 81 mg by mouth daily, Disp: , Rfl:     Biotin (BIOTIN 5000) 5 MG CAPS, Take by mouth, Disp: , Rfl:     cholecalciferol (VITAMIN D3) 1,000 units tablet, Take 1,000 Units by mouth daily, Disp: , Rfl:     Ferrous Sulfate (IRON) 28 MG TABS, Take by mouth, Disp: , Rfl:     gemfibrozil (LOPID) 600 mg tablet, Take 600 mg by mouth 2 (two) times a day before meals, Disp: , Rfl:     metFORMIN (GLUCOPHAGE) 500 mg tablet, Take 500 mg by mouth 2 (two) times a day with meals, Disp: , Rfl:     Mirabegron (MYRBETRIQ PO), Take by mouth, Disp: , Rfl:     oxybutynin (DITROPAN-XL) 10 MG 24 hr tablet, Take 10 mg by mouth daily at bedtime, Disp: , Rfl:     quinapril (ACCUPRIL) 5 mg tablet, Take 5 mg by mouth daily at bedtime, Disp: , Rfl:     venlafaxine (EFFEXOR) 75 mg tablet, Take 75 mg by mouth 2 (two) times a day, Disp: , Rfl:   Allergies   Allergen Reactions    Cephalosporins Other (See Comments)     RASH - BILATERAL ARMS PER RN     Vitals:    02/13/18 1459   BP: 112/70   Pulse: 70   Resp: 18   Temp: 98 2 °F (36 8 °C)       Labs:  CMP  Lab Results   Component Value Date     12/19/2017    K 4 1 12/19/2017     12/19/2017    CO2 22 12/19/2017    ANIONGAP 9 12/19/2017    BUN 14 12/19/2017    CREATININE 0 61 12/19/2017    GLUCOSE 153 (H) 12/19/2017    CALCIUM 7 3 (L) 12/19/2017    AST 92 (H) 12/19/2017    ALT 66 12/19/2017    ALKPHOS 99 12/19/2017    PROT 5 5 (L) 12/19/2017    BILITOT 0 32 12/19/2017    EGFR 99 12/19/2017       BMP  Lab Results   Component Value Date    GLUCOSE 153 (H) 12/19/2017    CALCIUM 7 3 (L) 12/19/2017     12/19/2017    K 4 1 12/19/2017    CO2 22 12/19/2017     12/19/2017    BUN 14 12/19/2017    CREATININE 0 61 12/19/2017       Lipids  Lab Results   Component Value Date    CHOL 183 10/27/2017    CHOL 186 10/10/2016    CHOL 206 (H) 10/01/2015     Lab Results   Component Value Date    HDL 63 10/27/2017    HDL 60 10/10/2016    HDL 50 10/01/2015     Lab Results   Component Value Date    LDLCALC 106 (H) 10/27/2017    LDLCALC 109 (H) 10/10/2016    LDLCALC 121 (H) 10/01/2015     Lab Results   Component Value Date    TRIG 69 10/27/2017    TRIG 87 10/10/2016    TRIG 176 (H) 10/01/2015     No components found for: CHOLHDL    Hemoglobin A1C  Lab Results   Component Value Date    HGBA1C 5 6 10/27/2017       Fasting Glucose  No results found for: GLUF    Insulin     Thyroid  No results found for: TSH, Z4NDXLR, R1FZHAZ, THYROIDAB    Hepatic Function Panel  Lab Results   Component Value Date    ALT 66 12/19/2017    AST 92 (H) 12/19/2017    ALKPHOS 99 12/19/2017    BILITOT 0 32 12/19/2017       Celiac Disease Antibody Panel  No results found for: ENDOMYSIAL IGA, GLIADIN IGA, GLIADIN IGG, IGA, TISSUE TRANSGLUT AB, TTG IGA   Iron  No results found for: IRON, TIBC, FERRITIN      Physical Exam   Constitutional: She is oriented to person, place, and time  No distress  Pulmonary/Chest: Effort normal and breath sounds normal  No respiratory distress  She has no wheezes  She has no rales  She exhibits no tenderness  Abdominal: Soft  Bowel sounds are normal  She exhibits no distension and no mass  There is no tenderness  There is no rebound and no guarding  Well healed incision(s)   Genitourinary:   Genitourinary Comments: The vaginal cuff is well healed  Surgical absence of the cervix, uterus, bilateral fallopian tubes and ovaries  Neurological: She is alert and oriented to person, place, and time  Skin: She is not diaphoretic  Assessment  63year-old with 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

## 2018-02-26 NOTE — PROGRESS NOTES
Discussion/Summary  Social Work-Discussion Summary [facility]: Patient is being seen for a distress screenning assessment  LSW reviewed pt's distress thermometer completed by pt on 1/9/2018 in gyn onc  Pt rated their distress a 0/10 and denied psychosocial problems  Based on pt's score, a social work follow up would not be indicated  If pt expresses psychosocial needs with their oncology care, LSW is available to provide cancer care counseling        Signatures   Electronically signed by : Marichuy Pennington; Jan 11 2018 11:56AM EST                       (Author)

## 2018-03-12 ENCOUNTER — OFFICE VISIT (OUTPATIENT)
Dept: FAMILY MEDICINE CLINIC | Facility: CLINIC | Age: 63
End: 2018-03-12
Payer: COMMERCIAL

## 2018-03-12 VITALS
SYSTOLIC BLOOD PRESSURE: 98 MMHG | HEIGHT: 59 IN | BODY MASS INDEX: 45.16 KG/M2 | DIASTOLIC BLOOD PRESSURE: 76 MMHG | TEMPERATURE: 98.2 F | WEIGHT: 224 LBS | OXYGEN SATURATION: 97 % | HEART RATE: 78 BPM | RESPIRATION RATE: 16 BRPM

## 2018-03-12 DIAGNOSIS — E78.5 DYSLIPIDEMIA: ICD-10-CM

## 2018-03-12 DIAGNOSIS — R73.03 PRE-DIABETES: Primary | ICD-10-CM

## 2018-03-12 DIAGNOSIS — D64.9 POSTOPERATIVE ANEMIA: ICD-10-CM

## 2018-03-12 DIAGNOSIS — I10 BENIGN ESSENTIAL HYPERTENSION: ICD-10-CM

## 2018-03-12 LAB — SL AMB POCT HEMOGLOBIN AIC: 5.6

## 2018-03-12 PROCEDURE — 99214 OFFICE O/P EST MOD 30 MIN: CPT | Performed by: INTERNAL MEDICINE

## 2018-03-12 PROCEDURE — 83036 HEMOGLOBIN GLYCOSYLATED A1C: CPT | Performed by: INTERNAL MEDICINE

## 2018-03-12 RX ORDER — LISDEXAMFETAMINE DIMESYLATE 70 MG/1
70 CAPSULE ORAL EVERY MORNING
Refills: 0 | COMMUNITY
Start: 2018-02-24 | End: 2018-04-09

## 2018-03-12 NOTE — PROGRESS NOTES
Assessment/Plan:         Diagnoses and all orders for this visit:    Pre-diabetes  -     POCT hemoglobin A1c    Other orders  -     VYVANSE 70 MG capsule; Take 70 mg by mouth every morning          Subjective:      Patient ID: Keith Rodriguez is a 63 y o  female  Pt relates she is seeing gyn q 3 months  She is s/p tigist/bso for endometrial cancer  She just had gyn f/u        The following portions of the patient's history were reviewed and updated as appropriate: She  has a past medical history of Anemia; Depression; Diabetes mellitus ([facility] [address] ); Hyperglycemia; Hyperlipidemia; Hypertension; and Obesity  She   Patient Active Problem List    Diagnosis Date Noted    S/P hysterectomy 12/19/2017    Benign essential hypertension 12/19/2017    Obesity 12/19/2017    Other abnormal glucose 12/19/2017    Overactive bladder 12/19/2017    Prediabetes 12/19/2017    Vitamin D deficiency 12/19/2017    Postoperative anemia 12/05/2017    Endometrial cancer ([facility] [address] ) 11/29/2017    Dyslipidemia 04/01/2014    Depression 10/07/2013     She  has a past surgical history that includes Abdominal surgery; Colonoscopy; pr lap, radical hyst w/ tube&ov, node bx (N/A, 12/19/2017); pr lap,diagnostic abdomen (N/A, 12/19/2017); Cholecystectomy; Gastric bypass; Tonsillectomy; and Hysterectomy (Bilateral)  Her family history includes Bone cancer in her maternal grandfather; Hodgkin's lymphoma in her father; Other in her mother; Ovarian cancer in her mother  She  reports that she has never smoked  She has never used smokeless tobacco  She reports that she does not drink alcohol or use drugs    Current Outpatient Prescriptions   Medication Sig Dispense Refill    ARIPiprazole (ABILIFY) 10 mg tablet Take 1 tablet by mouth daily      aspirin (ECOTRIN LOW STRENGTH) 81 mg EC tablet Take 81 mg by mouth daily      Biotin (BIOTIN 5000) 5 MG CAPS Take by mouth      cholecalciferol (VITAMIN D3) 1,000 units tablet Take 1,000 Units by mouth daily      Ferrous Sulfate (IRON) 28 MG TABS Take by mouth      gemfibrozil (LOPID) 600 mg tablet Take 600 mg by mouth daily        metFORMIN (GLUCOPHAGE) 500 mg tablet Take 500 mg by mouth daily        Mirabegron (MYRBETRIQ PO) Take by mouth      oxybutynin (DITROPAN-XL) 10 MG 24 hr tablet Take 10 mg by mouth daily at bedtime      quinapril (ACCUPRIL) 5 mg tablet Take 5 mg by mouth daily at bedtime      venlafaxine (EFFEXOR) 75 mg tablet Take 75 mg by mouth 3 (three) times a day        VYVANSE 70 MG capsule Take 70 mg by mouth every morning  0    Amphet-Dextroamphet 3-Bead ER (MYDAYIS) 50 MG CP24 Take by mouth      ARIPiprazole (ABILIFY) 10 mg tablet Take 10 mg by mouth daily       No current facility-administered medications for this visit  Current Outpatient Prescriptions on File Prior to Visit   Medication Sig    ARIPiprazole (ABILIFY) 10 mg tablet Take 1 tablet by mouth daily    aspirin (ECOTRIN LOW STRENGTH) 81 mg EC tablet Take 81 mg by mouth daily    Biotin (BIOTIN 5000) 5 MG CAPS Take by mouth    cholecalciferol (VITAMIN D3) 1,000 units tablet Take 1,000 Units by mouth daily    Ferrous Sulfate (IRON) 28 MG TABS Take by mouth    gemfibrozil (LOPID) 600 mg tablet Take 600 mg by mouth daily      metFORMIN (GLUCOPHAGE) 500 mg tablet Take 500 mg by mouth daily      Mirabegron (MYRBETRIQ PO) Take by mouth    oxybutynin (DITROPAN-XL) 10 MG 24 hr tablet Take 10 mg by mouth daily at bedtime    quinapril (ACCUPRIL) 5 mg tablet Take 5 mg by mouth daily at bedtime    venlafaxine (EFFEXOR) 75 mg tablet Take 75 mg by mouth 3 (three) times a day      Amphet-Dextroamphet 3-Bead ER (MYDAYIS) 50 MG CP24 Take by mouth    ARIPiprazole (ABILIFY) 10 mg tablet Take 10 mg by mouth daily     No current facility-administered medications on file prior to visit  She is allergic to cephalosporins       Review of Systems   Constitutional: Negative  HENT: Negative  Respiratory: Negative      Cardiovascular: Negative  Gastrointestinal: Negative  Objective:      BP 98/76 (BP Location: Left arm, Patient Position: Sitting, Cuff Size: Large)   Pulse 78   Temp 98 2 °F (36 8 °C) (Tympanic)   Resp 16   Ht 4' 11" (1 499 m)   Wt 102 kg (224 lb)   SpO2 97%   BMI 45 24 kg/m²          Physical Exam   Constitutional: She appears well-developed and well-nourished  HENT:   Head: Normocephalic and atraumatic  Neck: Normal range of motion  Neck supple  Cardiovascular: Normal rate and regular rhythm      Pulmonary/Chest: Effort normal and breath sounds normal

## 2018-04-09 ENCOUNTER — OFFICE VISIT (OUTPATIENT)
Dept: FAMILY MEDICINE CLINIC | Facility: CLINIC | Age: 63
End: 2018-04-09
Payer: COMMERCIAL

## 2018-04-09 VITALS
TEMPERATURE: 97.8 F | HEART RATE: 77 BPM | WEIGHT: 224 LBS | BODY MASS INDEX: 45.16 KG/M2 | RESPIRATION RATE: 16 BRPM | SYSTOLIC BLOOD PRESSURE: 118 MMHG | OXYGEN SATURATION: 98 % | DIASTOLIC BLOOD PRESSURE: 68 MMHG | HEIGHT: 59 IN

## 2018-04-09 DIAGNOSIS — J06.9 UPPER RESPIRATORY TRACT INFECTION, UNSPECIFIED TYPE: Primary | ICD-10-CM

## 2018-04-09 DIAGNOSIS — F90.9 ATTENTION DEFICIT HYPERACTIVITY DISORDER (ADHD), UNSPECIFIED ADHD TYPE: ICD-10-CM

## 2018-04-09 PROCEDURE — 99213 OFFICE O/P EST LOW 20 MIN: CPT | Performed by: INTERNAL MEDICINE

## 2018-04-09 RX ORDER — AMOXICILLIN 500 MG/1
500 CAPSULE ORAL EVERY 8 HOURS SCHEDULED
Qty: 30 CAPSULE | Refills: 0 | Status: SHIPPED | OUTPATIENT
Start: 2018-04-09 | End: 2018-04-19

## 2018-04-09 NOTE — PROGRESS NOTES
Assessment/Plan:         There are no diagnoses linked to this encounter  Subjective:      Patient ID: Shayan Allen is a 63 y o  female  Pt complains of head and chest congestion  +wall pain with coughing +productive cough ill x 2-3 days  Denies f/s/c  Denies sore throat or pleuritic pain  Face pressure        The following portions of the patient's history were reviewed and updated as appropriate:   She  has a past medical history of Anemia; Depression; Diabetes mellitus ([facility] [address] ); Hyperglycemia; Hyperlipidemia; Hypertension; and Obesity  She   Patient Active Problem List    Diagnosis Date Noted    S/P hysterectomy 12/19/2017    Benign essential hypertension 12/19/2017    Obesity 12/19/2017    Other abnormal glucose 12/19/2017    Overactive bladder 12/19/2017    Prediabetes 12/19/2017    Vitamin D deficiency 12/19/2017    Postoperative anemia 12/05/2017    Endometrial cancer ([facility] [address] ) 11/29/2017    Dyslipidemia 04/01/2014    Depression 10/07/2013     She  has a past surgical history that includes Abdominal surgery; Colonoscopy; pr lap, radical hyst w/ tube&ov, node bx (N/A, 12/19/2017); pr lap,diagnostic abdomen (N/A, 12/19/2017); Cholecystectomy; Gastric bypass; Tonsillectomy; and Hysterectomy (Bilateral)  Her family history includes Bone cancer in her maternal grandfather; Hodgkin's lymphoma in her father; Other in her mother; Ovarian cancer in her mother  She  reports that she has never smoked  She has never used smokeless tobacco  She reports that she does not drink alcohol or use drugs    Current Outpatient Prescriptions   Medication Sig Dispense Refill    ARIPiprazole (ABILIFY) 10 mg tablet Take 10 mg by mouth daily      aspirin (ECOTRIN LOW STRENGTH) 81 mg EC tablet Take 81 mg by mouth daily      cholecalciferol (VITAMIN D3) 1,000 units tablet Take 1,000 Units by mouth daily      Ferrous Sulfate (IRON) 28 MG TABS Take by mouth      gemfibrozil (LOPID) 600 mg tablet Take 600 mg by mouth daily        metFORMIN (GLUCOPHAGE) 500 mg tablet Take 500 mg by mouth daily        Mirabegron (MYRBETRIQ PO) Take by mouth      oxybutynin (DITROPAN-XL) 10 MG 24 hr tablet Take 10 mg by mouth daily at bedtime      quinapril (ACCUPRIL) 5 mg tablet Take 5 mg by mouth daily at bedtime      UNKNOWN TO PATIENT       venlafaxine (EFFEXOR) 75 mg tablet Take 75 mg by mouth 3 (three) times a day        ARIPiprazole (ABILIFY) 10 mg tablet Take 1 tablet by mouth daily      Biotin (BIOTIN 5000) 5 MG CAPS Take by mouth      pseudoephedrine-codeine-guaifenesin (MYTUSSIN DAC) 30- MG/5ML solution Take 10 mL by mouth 4 (four) times a day as needed for allergies      VYVANSE 70 MG capsule Take 70 mg by mouth every morning  0     No current facility-administered medications for this visit  Current Outpatient Prescriptions on File Prior to Visit   Medication Sig    ARIPiprazole (ABILIFY) 10 mg tablet Take 10 mg by mouth daily    aspirin (ECOTRIN LOW STRENGTH) 81 mg EC tablet Take 81 mg by mouth daily    cholecalciferol (VITAMIN D3) 1,000 units tablet Take 1,000 Units by mouth daily    Ferrous Sulfate (IRON) 28 MG TABS Take by mouth    gemfibrozil (LOPID) 600 mg tablet Take 600 mg by mouth daily      metFORMIN (GLUCOPHAGE) 500 mg tablet Take 500 mg by mouth daily      Mirabegron (MYRBETRIQ PO) Take by mouth    oxybutynin (DITROPAN-XL) 10 MG 24 hr tablet Take 10 mg by mouth daily at bedtime    quinapril (ACCUPRIL) 5 mg tablet Take 5 mg by mouth daily at bedtime    venlafaxine (EFFEXOR) 75 mg tablet Take 75 mg by mouth 3 (three) times a day      ARIPiprazole (ABILIFY) 10 mg tablet Take 1 tablet by mouth daily    Biotin (BIOTIN 5000) 5 MG CAPS Take by mouth    VYVANSE 70 MG capsule Take 70 mg by mouth every morning     No current facility-administered medications on file prior to visit  She is allergic to cephalosporins       Review of Systems   Constitutional: Negative for fever     HENT: Positive for congestion, sinus pressure and voice change  Negative for sore throat  Respiratory: Positive for cough and shortness of breath  Negative for wheezing  Cardiovascular: Negative  Neurological: Negative for headaches  Objective:      /68 (BP Location: Left arm, Patient Position: Sitting, Cuff Size: Large)   Pulse 77   Temp 97 8 °F (36 6 °C) (Tympanic)   Resp 16   Ht 4' 11" (1 499 m)   Wt 102 kg (224 lb)   SpO2 98%   BMI 45 24 kg/m²          Physical Exam   Constitutional: She appears well-developed and well-nourished  HENT:   Head: Normocephalic and atraumatic  Right Ear: External ear normal    Left Ear: External ear normal    Nose: Nose normal    Mouth/Throat: Oropharynx is clear and moist    Neck: Normal range of motion  Neck supple  Cardiovascular: Normal rate and regular rhythm      Pulmonary/Chest: Effort normal and breath sounds normal

## 2018-05-15 LAB
ALBUMIN SERPL-MCNC: 4.7 G/DL (ref 3.6–4.8)
ALBUMIN/GLOB SERPL: 1.8 {RATIO} (ref 1.2–2.2)
ALP SERPL-CCNC: 103 IU/L (ref 39–117)
ALT SERPL-CCNC: 17 IU/L (ref 0–32)
AMBIG ABBREV DEFAULT: NORMAL
AMBIG ABBREV DEFAULT: NORMAL
AST SERPL-CCNC: 15 IU/L (ref 0–40)
BASOPHILS # BLD AUTO: 0 X10E3/UL (ref 0–0.2)
BASOPHILS NFR BLD AUTO: 0 %
BILIRUB SERPL-MCNC: 0.4 MG/DL (ref 0–1.2)
BUN SERPL-MCNC: 12 MG/DL (ref 8–27)
BUN/CREAT SERPL: 17 (ref 12–28)
CALCIUM SERPL-MCNC: 9.6 MG/DL (ref 8.7–10.3)
CHLORIDE SERPL-SCNC: 98 MMOL/L (ref 96–106)
CHOLEST SERPL-MCNC: 206 MG/DL (ref 100–199)
CO2 SERPL-SCNC: 23 MMOL/L (ref 18–29)
CREAT SERPL-MCNC: 0.72 MG/DL (ref 0.57–1)
EOSINOPHIL # BLD AUTO: 0 X10E3/UL (ref 0–0.4)
EOSINOPHIL NFR BLD AUTO: 0 %
ERYTHROCYTE [DISTWIDTH] IN BLOOD BY AUTOMATED COUNT: 14.3 % (ref 12.3–15.4)
GLOBULIN SER-MCNC: 2.6 G/DL (ref 1.5–4.5)
GLUCOSE SERPL-MCNC: 105 MG/DL (ref 65–99)
HCT VFR BLD AUTO: 40.1 % (ref 34–46.6)
HDLC SERPL-MCNC: 64 MG/DL
HGB BLD-MCNC: 12.9 G/DL (ref 11.1–15.9)
IMM GRANULOCYTES # BLD: 0.1 X10E3/UL (ref 0–0.1)
IMM GRANULOCYTES NFR BLD: 1 %
LDLC SERPL CALC-MCNC: 121 MG/DL (ref 0–99)
LYMPHOCYTES # BLD AUTO: 2.6 X10E3/UL (ref 0.7–3.1)
LYMPHOCYTES NFR BLD AUTO: 26 %
MCH RBC QN AUTO: 28.4 PG (ref 26.6–33)
MCHC RBC AUTO-ENTMCNC: 32.2 G/DL (ref 31.5–35.7)
MCV RBC AUTO: 88 FL (ref 79–97)
MONOCYTES # BLD AUTO: 0.5 X10E3/UL (ref 0.1–0.9)
MONOCYTES NFR BLD AUTO: 6 %
NEUTROPHILS # BLD AUTO: 6.5 X10E3/UL (ref 1.4–7)
NEUTROPHILS NFR BLD AUTO: 67 %
PLATELET # BLD AUTO: 388 X10E3/UL (ref 150–379)
POTASSIUM SERPL-SCNC: 4.2 MMOL/L (ref 3.5–5.2)
PROT SERPL-MCNC: 7.3 G/DL (ref 6–8.5)
RBC # BLD AUTO: 4.55 X10E6/UL (ref 3.77–5.28)
SL AMB EGFR AFRICAN AMERICAN: 105 ML/MIN/1.73
SL AMB EGFR NON AFRICAN AMERICAN: 91 ML/MIN/1.73
SL AMB VLDL CHOLESTEROL CALC: 21 MG/DL (ref 5–40)
SODIUM SERPL-SCNC: 141 MMOL/L (ref 134–144)
TRIGL SERPL-MCNC: 105 MG/DL (ref 0–149)
WBC # BLD AUTO: 9.7 X10E3/UL (ref 3.4–10.8)

## 2018-05-21 ENCOUNTER — OFFICE VISIT (OUTPATIENT)
Dept: GYNECOLOGIC ONCOLOGY | Facility: CLINIC | Age: 63
End: 2018-05-21
Payer: COMMERCIAL

## 2018-05-21 VITALS
BODY MASS INDEX: 45.7 KG/M2 | DIASTOLIC BLOOD PRESSURE: 82 MMHG | HEIGHT: 59 IN | SYSTOLIC BLOOD PRESSURE: 124 MMHG | RESPIRATION RATE: 16 BRPM | WEIGHT: 226.7 LBS | TEMPERATURE: 98.4 F | HEART RATE: 86 BPM

## 2018-05-21 DIAGNOSIS — C54.1 ENDOMETRIAL CANCER (HCC): Primary | ICD-10-CM

## 2018-05-21 PROCEDURE — 99214 OFFICE O/P EST MOD 30 MIN: CPT | Performed by: OBSTETRICS & GYNECOLOGY

## 2018-05-21 NOTE — PROGRESS NOTES
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  Patient underwent her initial surgery on November 17, 2017  Patient comes in today for her 2nd postoperative visit with no complaints  She denies vaginal bleeding of any kind  Endometrial cancer ([facility] [address] )    11/17/2017 Initial Diagnosis     Endometrial cancer ([facility] [address] )         12/19/2017 Surgery     Robotic assisted total laparoscopic hysterectomy with bilateral salpingo-oophorectomy and sentinel bilateral pelvic lymph node dissection  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 (4 4 x 3 2 cm tumor, 9 4/15 4mm invasion, NO LVSI, washings revealed atypical cellular changes)            Review of Systems    Patient Active Problem List   Diagnosis    S/P hysterectomy    Benign essential hypertension    Depression    Dyslipidemia    Endometrial cancer (HCC)    Obesity    Other abnormal glucose    Overactive bladder    Prediabetes    Vitamin D deficiency    Postoperative anemia     Social History     Social History    Marital status: Single     Spouse name: N/A    Number of children: N/A    Years of education: N/A     Occupational History    Not on file       Social History Main Topics    Smoking status: Never Smoker    Smokeless tobacco: Never Used    Alcohol use No    Drug use: No    Sexual activity: Not on file     Other Topics Concern    Not on file     Social History Narrative    No narrative on file     Past Medical History:   Diagnosis Date    Anemia     Depression     Diabetes mellitus ([facility] [address] )     Hyperglycemia     vx type 2 dm -- last assessed 4/1/14; resolved 11/7/17    Hyperlipidemia  Hypertension     Obesity     last assessed 10/14/17; resolved 11/7/17       Current Outpatient Prescriptions:     Amphet-Dextroamphet 3-Bead ER 50 MG CP24, Take 50 mg by mouth Daily, Disp: , Rfl:     ARIPiprazole (ABILIFY) 10 mg tablet, Take 1 tablet by mouth daily, Disp: , Rfl:     aspirin (ECOTRIN LOW STRENGTH) 81 mg EC tablet, Take 81 mg by mouth daily, Disp: , Rfl:     Biotin (BIOTIN 5000) 5 MG CAPS, Take by mouth, Disp: , Rfl:     cholecalciferol (VITAMIN D3) 1,000 units tablet, Take 1,000 Units by mouth daily, Disp: , Rfl:     Ferrous Sulfate (IRON) 28 MG TABS, Take by mouth, Disp: , Rfl:     gemfibrozil (LOPID) 600 mg tablet, Take 600 mg by mouth daily  , Disp: , Rfl:     metFORMIN (GLUCOPHAGE) 500 mg tablet, Take 500 mg by mouth daily  , Disp: , Rfl:     Mirabegron (MYRBETRIQ PO), Take by mouth, Disp: , Rfl:     oxybutynin (DITROPAN-XL) 10 MG 24 hr tablet, Take 10 mg by mouth daily at bedtime, Disp: , Rfl:     quinapril (ACCUPRIL) 5 mg tablet, Take 5 mg by mouth daily at bedtime, Disp: , Rfl:     venlafaxine (EFFEXOR) 75 mg tablet, Take 75 mg by mouth 3 (three) times a day  , Disp: , Rfl:     ARIPiprazole (ABILIFY) 10 mg tablet, Take 10 mg by mouth daily, Disp: , Rfl:     UNKNOWN TO PATIENT, , Disp: , Rfl:   Allergies   Allergen Reactions    Cephalosporins Other (See Comments)     RASH - BILATERAL ARMS PER RN     Vitals:    05/21/18 1518   BP: 124/82   Pulse: 86   Resp: 16   Temp: 98 4 °F (36 9 °C)       Labs:  CMP  Lab Results   Component Value Date     12/19/2017    K 4 1 12/19/2017     12/19/2017    CO2 22 12/19/2017    ANIONGAP 9 12/19/2017    BUN 12 05/11/2018    CREATININE 0 72 05/11/2018    GLUCOSE 153 (H) 12/19/2017    CALCIUM 7 3 (L) 12/19/2017    AST 92 (H) 12/19/2017    ALT 66 12/19/2017    ALKPHOS 99 12/19/2017    PROT 5 5 (L) 12/19/2017    BILITOT 0 32 12/19/2017    EGFR 99 12/19/2017       BMP  Lab Results   Component Value Date    GLUCOSE 153 (H) 12/19/2017 CALCIUM 7 3 (L) 12/19/2017     12/19/2017    K 4 1 12/19/2017    CO2 22 12/19/2017     12/19/2017    BUN 12 05/11/2018    CREATININE 0 72 05/11/2018       Lipids  Lab Results   Component Value Date    CHOL 183 10/27/2017    CHOL 186 10/10/2016    CHOL 206 (H) 10/01/2015     Lab Results   Component Value Date    HDL 63 10/27/2017    HDL 60 10/10/2016    HDL 50 10/01/2015     Lab Results   Component Value Date    LDLCALC 106 (H) 10/27/2017    LDLCALC 109 (H) 10/10/2016    LDLCALC 121 (H) 10/01/2015     Lab Results   Component Value Date    TRIG 105 05/11/2018    TRIG 69 10/27/2017    TRIG 87 10/10/2016     No components found for: CHOLHDL    Hemoglobin A1C  Lab Results   Component Value Date    HGBA1C 5 6 03/12/2018       Fasting Glucose  No results found for: GLUF    Insulin     Thyroid  No results found for: TSH, F0NGXZS, P4UTGMJ, THYROIDAB    Hepatic Function Panel  Lab Results   Component Value Date    ALT 66 12/19/2017    AST 92 (H) 12/19/2017    ALKPHOS 99 12/19/2017    BILITOT 0 32 12/19/2017       Celiac Disease Antibody Panel  No results found for: ENDOMYSIAL IGA, GLIADIN IGA, GLIADIN IGG, IGA, TISSUE TRANSGLUT AB, TTG IGA   Iron  No results found for: IRON, TIBC, FERRITIN      Physical Exam   Constitutional: She is oriented to person, place, and time  No distress  Pulmonary/Chest: Effort normal and breath sounds normal  No respiratory distress  She has no wheezes  She has no rales  She exhibits no tenderness  Abdominal: Soft  Bowel sounds are normal  She exhibits no distension and no mass  There is no tenderness  There is no rebound and no guarding  Well healed incision(s)   Genitourinary:   Genitourinary Comments: The vaginal cuff is well healed  Surgical absence of the cervix, uterus, bilateral fallopian tubes and ovaries  Neurological: She is alert and oriented to person, place, and time  Skin: She is not diaphoretic         Assessment  63year-old with 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

## 2018-06-04 ENCOUNTER — APPOINTMENT (OUTPATIENT)
Dept: RADIOLOGY | Facility: MEDICAL CENTER | Age: 63
End: 2018-06-04
Payer: COMMERCIAL

## 2018-06-04 ENCOUNTER — OFFICE VISIT (OUTPATIENT)
Dept: FAMILY MEDICINE CLINIC | Facility: CLINIC | Age: 63
End: 2018-06-04
Payer: COMMERCIAL

## 2018-06-04 VITALS
HEART RATE: 74 BPM | RESPIRATION RATE: 16 BRPM | HEIGHT: 59 IN | TEMPERATURE: 97.5 F | DIASTOLIC BLOOD PRESSURE: 80 MMHG | OXYGEN SATURATION: 98 % | WEIGHT: 230 LBS | SYSTOLIC BLOOD PRESSURE: 124 MMHG | BODY MASS INDEX: 46.37 KG/M2

## 2018-06-04 DIAGNOSIS — I10 BENIGN ESSENTIAL HYPERTENSION: ICD-10-CM

## 2018-06-04 DIAGNOSIS — M54.50 LUMBAR BACK PAIN: ICD-10-CM

## 2018-06-04 DIAGNOSIS — M54.50 LUMBAR BACK PAIN: Primary | ICD-10-CM

## 2018-06-04 PROCEDURE — 72110 X-RAY EXAM L-2 SPINE 4/>VWS: CPT

## 2018-06-04 PROCEDURE — 99213 OFFICE O/P EST LOW 20 MIN: CPT | Performed by: INTERNAL MEDICINE

## 2018-06-04 RX ORDER — CYCLOBENZAPRINE HCL 10 MG
10 TABLET ORAL
Qty: 15 TABLET | Refills: 2 | Status: SHIPPED | OUTPATIENT
Start: 2018-06-04 | End: 2019-01-23 | Stop reason: ALTCHOICE

## 2018-06-04 RX ORDER — MELOXICAM 15 MG/1
15 TABLET ORAL DAILY
Qty: 30 TABLET | Refills: 2 | Status: SHIPPED | OUTPATIENT
Start: 2018-06-04 | End: 2019-01-23 | Stop reason: ALTCHOICE

## 2018-06-04 NOTE — LETTER
June 4, 2018     Patient: Nurys Alexander   YOB: 1955   Date of Visit: 6/4/2018       To Whom it May Concern:    Jaime Parry is under my professional care  She was seen in my office on 6/4/2018  She may return to work on 6/5/18  If you have any questions or concerns, please don't hesitate to call           Sincerely,          [employer], DO        CC: No Recipients

## 2018-06-04 NOTE — PROGRESS NOTES
Assessment/Plan:         Diagnoses and all orders for this visit:    Lumbar back pain  Comments:  rx nsaid/muscle relaxer  discussed tramadol but she wants to go to work tomorrow  will add tylenol as well  Orders:  -     XR spine lumbar minimum 4 views non injury; Future  -     cyclobenzaprine (FLEXERIL) 10 mg tablet; Take 1 tablet (10 mg total) by mouth daily at bedtime  -     meloxicam (MOBIC) 15 mg tablet; Take 1 tablet (15 mg total) by mouth daily    Benign essential hypertension          Subjective:      Patient ID: Jelani Gomez is a 63 y o  female  Pt denies trauma  Rt low back pain  She did a lot of weed pulling and thinks she aggravated it  The following portions of the patient's history were reviewed and updated as appropriate:   She  has a past medical history of Anemia; Depression; Diabetes mellitus ([facility] [address] ); Hyperglycemia; Hyperlipidemia; Hypertension; and Obesity  She   Patient Active Problem List    Diagnosis Date Noted    S/P hysterectomy 12/19/2017    Benign essential hypertension 12/19/2017    Obesity 12/19/2017    Other abnormal glucose 12/19/2017    Overactive bladder 12/19/2017    Prediabetes 12/19/2017    Vitamin D deficiency 12/19/2017    Postoperative anemia 12/05/2017    Endometrial cancer ([facility] [address] ) 11/29/2017    Dyslipidemia 04/01/2014    Depression 10/07/2013     She  has a past surgical history that includes Abdominal surgery; Colonoscopy; pr lap, radical hyst w/ tube&ov, node bx (N/A, 12/19/2017); pr lap,diagnostic abdomen (N/A, 12/19/2017); Cholecystectomy; Gastric bypass; Tonsillectomy; and Hysterectomy (Bilateral)  Her family history includes Bone cancer in her maternal grandfather; Hodgkin's lymphoma in her father; Other in her mother; Ovarian cancer in her mother  She  reports that she has never smoked  She has never used smokeless tobacco  She reports that she does not drink alcohol or use drugs    Current Outpatient Prescriptions   Medication Sig Dispense Refill    Amphet-Dextroamphet 3-Bead ER 50 MG CP24 Take 50 mg by mouth Daily      ARIPiprazole (ABILIFY) 10 mg tablet Take 1 tablet by mouth daily      aspirin (ECOTRIN LOW STRENGTH) 81 mg EC tablet Take 81 mg by mouth daily      Biotin (BIOTIN 5000) 5 MG CAPS Take by mouth      cholecalciferol (VITAMIN D3) 1,000 units tablet Take 1,000 Units by mouth daily      Ferrous Sulfate (IRON) 28 MG TABS Take by mouth      gemfibrozil (LOPID) 600 mg tablet Take 600 mg by mouth daily        metFORMIN (GLUCOPHAGE) 500 mg tablet Take 500 mg by mouth daily        Mirabegron (MYRBETRIQ PO) Take by mouth      oxybutynin (DITROPAN-XL) 10 MG 24 hr tablet Take 10 mg by mouth daily at bedtime      quinapril (ACCUPRIL) 5 mg tablet Take 5 mg by mouth daily at bedtime      venlafaxine (EFFEXOR) 75 mg tablet Take 75 mg by mouth 3 (three) times a day        ARIPiprazole (ABILIFY) 10 mg tablet Take 10 mg by mouth daily      cyclobenzaprine (FLEXERIL) 10 mg tablet Take 1 tablet (10 mg total) by mouth daily at bedtime 15 tablet 2    meloxicam (MOBIC) 15 mg tablet Take 1 tablet (15 mg total) by mouth daily 30 tablet 2    UNKNOWN TO PATIENT        No current facility-administered medications for this visit        Current Outpatient Prescriptions on File Prior to Visit   Medication Sig    Amphet-Dextroamphet 3-Bead ER 50 MG CP24 Take 50 mg by mouth Daily    ARIPiprazole (ABILIFY) 10 mg tablet Take 1 tablet by mouth daily    aspirin (ECOTRIN LOW STRENGTH) 81 mg EC tablet Take 81 mg by mouth daily    Biotin (BIOTIN 5000) 5 MG CAPS Take by mouth    cholecalciferol (VITAMIN D3) 1,000 units tablet Take 1,000 Units by mouth daily    Ferrous Sulfate (IRON) 28 MG TABS Take by mouth    gemfibrozil (LOPID) 600 mg tablet Take 600 mg by mouth daily      metFORMIN (GLUCOPHAGE) 500 mg tablet Take 500 mg by mouth daily      Mirabegron (MYRBETRIQ PO) Take by mouth    oxybutynin (DITROPAN-XL) 10 MG 24 hr tablet Take 10 mg by mouth daily at bedtime    quinapril (ACCUPRIL) 5 mg tablet Take 5 mg by mouth daily at bedtime    venlafaxine (EFFEXOR) 75 mg tablet Take 75 mg by mouth 3 (three) times a day      ARIPiprazole (ABILIFY) 10 mg tablet Take 10 mg by mouth daily    UNKNOWN TO PATIENT      No current facility-administered medications on file prior to visit  She is allergic to cephalosporins       Review of Systems      Objective:      /80 (BP Location: Left arm, Patient Position: Sitting, Cuff Size: Large)   Pulse 74   Temp 97 5 °F (36 4 °C) (Tympanic)   Resp 16   Ht 4' 11" (1 499 m)   Wt 104 kg (230 lb)   SpO2 98%   BMI 46 45 kg/m²          Physical Exam

## 2018-06-12 ENCOUNTER — OFFICE VISIT (OUTPATIENT)
Dept: UROLOGY | Facility: AMBULATORY SURGERY CENTER | Age: 63
End: 2018-06-12
Payer: COMMERCIAL

## 2018-06-12 ENCOUNTER — TELEPHONE (OUTPATIENT)
Dept: UROLOGY | Facility: AMBULATORY SURGERY CENTER | Age: 63
End: 2018-06-12

## 2018-06-12 VITALS
WEIGHT: 225 LBS | HEIGHT: 59 IN | SYSTOLIC BLOOD PRESSURE: 126 MMHG | DIASTOLIC BLOOD PRESSURE: 82 MMHG | BODY MASS INDEX: 45.36 KG/M2 | HEART RATE: 78 BPM

## 2018-06-12 DIAGNOSIS — R35.0 FREQUENCY OF URINATION: Primary | ICD-10-CM

## 2018-06-12 PROCEDURE — 99213 OFFICE O/P EST LOW 20 MIN: CPT | Performed by: NURSE PRACTITIONER

## 2018-06-12 NOTE — PROGRESS NOTES
6/12/2018    Serena Melendez  1955  [id number]        Assessment  Overactive bladder with frequency and incontinence    Discussion  Jose Carney is a 63 y o  female being managed by Tiki Vaughan  She continues to find her urinary symptoms bothersome despite medical management  We discussed alternative medicine options  We also discussed alternative treatment options including InterStim, Botox, PT NS  We also discussed pelvic floor physical therapy  The patient wishes to try PT NS  She was provided a reading material on this option  She was also given a voiding diary that she will keep for 3 days and bring with her to her 1st appointment  She will continue to take Myrbetriq and oxybutynin until she starts PT NS  She was instructed to call with any issues  All questions were answered  History of Present Illness  63 y o  female with a history of overactive bladder presents today for 6 month follow up  She continues to take Myrbetriq 50mg and oxybutynin 10mg daily  She complains of urgency incontinence  She uses 1 to 2 sanitary pads per day  She denies any nocturia and is dry at night  She noticed some improvement of symptoms with the medical management but her symptoms remained bothersome  She denies any urinary tract infections  She denies any changes in her overall health  Review of Systems  Review of Systems   Constitutional: Negative  HENT: Negative  Respiratory: Negative  Cardiovascular: Negative  Gastrointestinal: Negative  Genitourinary:        As per HPI   Musculoskeletal: Negative  Skin: Negative  Neurological: Negative  Hematological: Negative            Past Medical History  Past Medical History:   Diagnosis Date    Anemia     Depression     Diabetes mellitus ([facility] [address] )     Hyperglycemia     vx type 2 dm -- last assessed 4/1/14; resolved 11/7/17    Hyperlipidemia     Hypertension     Obesity     last assessed 10/14/17; resolved 11/7/17       Past Surgical History  Past Surgical History:   Procedure Laterality Date    ABDOMINAL SURGERY      GASTRIC BYPASS    CHOLECYSTECTOMY      at the time of gastric bypass    COLONOSCOPY      GASTRIC BYPASS      HYSTERECTOMY Bilateral     total abdominal with salpingo-oophorectomy    TN LAP, RADICAL HYST W/ TUBE&OV, NODE BX N/A 12/19/2017    Procedure: HYSTERECTOMY LAPAROSCOPIC TOTAL ([address]) W/ ROBOTICS; BILATERAL SALPINGOOPHERECTOMY; LYMPH NODE DISSECTION; lysis of adhesions;  Surgeon: Apryl Bear MD;  [facility];  Service: Gynecology Oncology    TN LAP,DIAGNOSTIC ABDOMEN N/A 12/19/2017    Procedure: LAPAROSCOPY DIAGNOSTIC;  Surgeon: Apryl Bear MD;  [facility];  Service: Gynecology Oncology    TONSILLECTOMY          Past Family History  Family History   Problem Relation Age of Onset    Other Mother      dyslipidemia    Ovarian cancer Mother     Hodgkin's lymphoma Father     Bone cancer Maternal Grandfather        Past Social history  Social History     Social History    Marital status: Single     Spouse name: N/A    Number of children: N/A    Years of education: N/A     Occupational History    Not on file       Social History Main Topics    Smoking status: Never Smoker    Smokeless tobacco: Never Used    Alcohol use No    Drug use: No    Sexual activity: Not on file     Other Topics Concern    Not on file     Social History Narrative    No narrative on file       Current Medications  Current Outpatient Prescriptions   Medication Sig Dispense Refill    Amphet-Dextroamphet 3-Bead ER 50 MG CP24 Take 50 mg by mouth Daily      ARIPiprazole (ABILIFY) 10 mg tablet Take 10 mg by mouth daily      aspirin (ECOTRIN LOW STRENGTH) 81 mg EC tablet Take 81 mg by mouth daily      Biotin (BIOTIN 5000) 5 MG CAPS Take by mouth      cholecalciferol (VITAMIN D3) 1,000 units tablet Take 1,000 Units by mouth daily      cyclobenzaprine (FLEXERIL) 10 mg tablet Take 1 tablet (10 mg total) by mouth daily at bedtime 15 tablet 2    Ferrous Sulfate (IRON) 28 MG TABS Take by mouth      gemfibrozil (LOPID) 600 mg tablet Take 600 mg by mouth daily        meloxicam (MOBIC) 15 mg tablet Take 1 tablet (15 mg total) by mouth daily 30 tablet 2    metFORMIN (GLUCOPHAGE) 500 mg tablet Take 500 mg by mouth daily        Mirabegron (MYRBETRIQ PO) Take by mouth      oxybutynin (DITROPAN-XL) 10 MG 24 hr tablet Take 10 mg by mouth daily at bedtime      quinapril (ACCUPRIL) 5 mg tablet Take 5 mg by mouth daily at bedtime      venlafaxine (EFFEXOR) 75 mg tablet Take 75 mg by mouth 3 (three) times a day        ARIPiprazole (ABILIFY) 10 mg tablet Take 1 tablet by mouth daily      UNKNOWN TO PATIENT        No current facility-administered medications for this visit  Allergies  Allergies   Allergen Reactions    Cephalosporins Other (See Comments)     RASH - BILATERAL ARMS PER RN       Past Medical History, Social History, Family History, medications and allergies were reviewed and updated as appropriate  Vitals  Vitals:    06/12/18 0838   BP: 126/82   Pulse: 78   Weight: 102 kg (225 lb)   Height: 4' 11" (1 499 m)       Physical Exam  Skin: warm, dry, intact  Cardiac: S1S2, HRR, Peripheral edema: negative  Pulmonary: Non-labored breathing  Abdomen: Soft, non-tender, non-distended  Musculoskeletal: AROM with no joint deformity or tenderness    Neurology: Alert and oriented            Results    Lab Results   Component Value Date    GLUCOSE 153 (H) 12/19/2017    CALCIUM 7 3 (L) 12/19/2017     12/19/2017    K 4 1 12/19/2017    CO2 22 12/19/2017     12/19/2017    BUN 12 05/11/2018    CREATININE 0 72 05/11/2018     Lab Results   Component Value Date    WBC 14 34 (H) 12/21/2017    HGB 12 9 05/11/2018    HCT 40 1 05/11/2018    MCV 88 05/11/2018     (H) 05/11/2018

## 2018-06-12 NOTE — TELEPHONE ENCOUNTER
Return for PTNS treatment with RN   Pt aware that you will contact her when you return from vacation

## 2018-06-18 NOTE — TELEPHONE ENCOUNTER
PTNS no auth needed ok to schedule   I spoke with Ольга Segal ref# [id number]  Thanks  Arian Still

## 2018-06-20 NOTE — TELEPHONE ENCOUNTER
Spoke with patient at 1120  PTNS appointments scheduled at [facility] per her preference in accordance with her schedule  She will start 7/5/18  List of appts mailed to patient  She has a copy of the bladder diary and knows to complete 3 days and bring with her to her first appt

## 2018-06-27 NOTE — TELEPHONE ENCOUNTER
Patient called and needed to adjust a couple of her appt times for PTNS  They were changed accordingly  She also questioned if she should still be taking her medication  Reviewed that she should continue myrbetriq and oxybutynin per last ov note

## 2018-07-03 ENCOUNTER — TELEPHONE (OUTPATIENT)
Dept: UROLOGY | Facility: AMBULATORY SURGERY CENTER | Age: 63
End: 2018-07-03

## 2018-07-03 NOTE — TELEPHONE ENCOUNTER
Patient can't make that time this week and therefore wishes to hold off on starting PTNS until 7/25/18  appts adjusted accordingly

## 2018-07-20 ENCOUNTER — TELEPHONE (OUTPATIENT)
Dept: UROLOGY | Facility: CLINIC | Age: 63
End: 2018-07-20

## 2018-07-20 NOTE — TELEPHONE ENCOUNTER
Rescheduled  Unfortunately no AP available the week of 9/20 for rescheduled eval appt to the following week 9/28 ptns 9/12 with DANIEL Jimenez  Will review changes with patient at her first ptns appt

## 2018-07-25 ENCOUNTER — PROCEDURE VISIT (OUTPATIENT)
Dept: UROLOGY | Facility: AMBULATORY SURGERY CENTER | Age: 63
End: 2018-07-25
Payer: COMMERCIAL

## 2018-07-25 VITALS
HEART RATE: 76 BPM | DIASTOLIC BLOOD PRESSURE: 78 MMHG | HEIGHT: 59 IN | BODY MASS INDEX: 45.68 KG/M2 | SYSTOLIC BLOOD PRESSURE: 126 MMHG | WEIGHT: 226.6 LBS

## 2018-07-25 DIAGNOSIS — N39.46 MIXED STRESS AND URGE URINARY INCONTINENCE: ICD-10-CM

## 2018-07-25 DIAGNOSIS — R35.0 URINARY FREQUENCY: Primary | ICD-10-CM

## 2018-07-25 DIAGNOSIS — R39.15 URINARY URGENCY: ICD-10-CM

## 2018-07-25 PROCEDURE — 64566 NEUROELTRD STIM POST TIBIAL: CPT | Performed by: UROLOGY

## 2018-07-25 NOTE — PROGRESS NOTES
2018    Jose Guadalupe Melendez  1955  [id number]    Diagnosis  Chief Complaint     Urinary Frequency; Urinary Incontinence         Patient presents for PTNS  12 managed by Dr Mohit Bernardo  Patient will follow up in one week for her next treatment  Urinary Incontinence Screening      Most Recent Value   Urinary Incontinence   Urinary Incontinence? Yes [mostly urge rare stress, 2 pads per day]   Incomplete emptying? No   Urinary frequency? Yes   Urinary urgency? Yes [sometimes]   Urinary hesitancy? No   Dysuria (painful difficult urination)? No   Nocturia (waking up to use the bathroom)? Yes [1x]   Straining (having to push to go)? No   Weak stream?  No   Intermittent stream?  No   Post void dribbling? No   Vaginal pressure? No   Vaginal dryness?   No        Vitals:    18 0935   BP: 126/78   BP Location: Left arm   Patient Position: Sitting   Cuff Size: Adult   Pulse: 76   Weight: 103 kg (226 lb 9 6 oz)   Height: 4' 11" (1 499 m)         Procedure PTNS  Session 1 of 12    Ankle used: left  Treatment settin  Duration of treatment: 30 minutes  Lead lot #:[id number]  Expiration Date:10/28/2020  Feeling/Response:arch of the foot and her toes    Patient Goals and Progress    Related Health and Social Factors Yes, diabetes    Bladder Diary Summary:  Caffeine cups per day:3 large glasses of tea  Alcohol- number of drinks per day:none  Daytime voids:8  Nighttime voids:1  Urgency:average 3 on a scale of 1-5 with 5 being severe urge, she does note occ severe urge however which is bothersome  Incontinence- episodes per day: 2 pads per day on average    Treatment Plan    Decrease Caffeine Yes    Yarelis Castano, RN  CURN, BSN

## 2018-08-02 ENCOUNTER — PROCEDURE VISIT (OUTPATIENT)
Dept: UROLOGY | Facility: CLINIC | Age: 63
End: 2018-08-02
Payer: COMMERCIAL

## 2018-08-02 VITALS
BODY MASS INDEX: 45.96 KG/M2 | DIASTOLIC BLOOD PRESSURE: 80 MMHG | SYSTOLIC BLOOD PRESSURE: 118 MMHG | HEART RATE: 80 BPM | WEIGHT: 228 LBS | HEIGHT: 59 IN

## 2018-08-02 DIAGNOSIS — N39.46 MIXED STRESS AND URGE URINARY INCONTINENCE: ICD-10-CM

## 2018-08-02 DIAGNOSIS — R35.0 URINARY FREQUENCY: ICD-10-CM

## 2018-08-02 DIAGNOSIS — R39.15 URINARY URGENCY: ICD-10-CM

## 2018-08-02 PROCEDURE — 64566 NEUROELTRD STIM POST TIBIAL: CPT | Performed by: UROLOGY

## 2018-08-02 NOTE — PROGRESS NOTES
2018    Reese Melendez  1955  [id number]    Diagnosis  Chief Complaint     Urinary Frequency; Urinary Incontinence         Patient presents for PTNS 2 of 12 managed by Dr Lissette Ireland  Patient will follow up in one week for next treatment    Urinary Incontinence Screening      Most Recent Value   Urinary Incontinence   Urinary Incontinence? Yes [2 pads per day]   Incomplete emptying? No   Urinary frequency? Yes   Urinary urgency? Yes [seems better already]   Urinary hesitancy? No   Dysuria (painful difficult urination)? No   Nocturia (waking up to use the bathroom)? Yes [1x]   Straining (having to push to go)? No   Weak stream?  No   Intermittent stream?  No   Post void dribbling?   Yes            Procedure PTNS  Session 2 of 12    Ankle used: right  Treatment settin  Duration of treatment: 30 minutes  Lead lot #:[id number]  Expiration Date:10/28/2020  Feeling/Response:heel and the sole of the foot      Juanita Dunn RN

## 2018-08-06 ENCOUNTER — TELEPHONE (OUTPATIENT)
Dept: UROLOGY | Facility: HOSPITAL | Age: 63
End: 2018-08-06

## 2018-08-06 NOTE — TELEPHONE ENCOUNTER
Initial check of insurance done 6/18/18 stated no authorization was needed  Called and spoke with patient  Her insurance is saying it is not covered because it's considered experimental   Reviewed with the patient that we were not told that when we called to do the initial authorization  Additionally, the patient herself called to check into the copay information and was not told at that time that it would not be covered  Will have our insurance authorization specialist recheck, and call the patient back with an update

## 2018-08-06 NOTE — TELEPHONE ENCOUNTER
Spoke with Millicent James at the billing department she is having one of the billers call me regarding this I called the insurance and they told me medical records need to be sent for them to review and see if this is medically necessary  I would hold off on continuing this if you can until we hear from the insurance     Thanks  Martín Ribera

## 2018-08-07 NOTE — TELEPHONE ENCOUNTER
Called and spoke with patient  Reviewed that at this time insurance will only covered if deemed "medically necessary "  We are currently waiting for the billing department to submit more information to see if we can get treatment covered  Until we are able to get more information treatment is on hold  Patient is on vacation next week so tentative date for next treatment would be 8/23

## 2018-08-17 NOTE — TELEPHONE ENCOUNTER
I called and spoke with Riana Fernando in our billing department to follow up with the PTNS, 7/25/18 was paid for after being rejected twice then records were sent over and it got approved, now 8/2/18 was denied and they sent the patient the bill no records ever ended up being sent  I called the insurance myself and spoke with Luis Angel HOANG and told me to send the medical records over to [address] which was mailed out today 8/17/18 to have them pay for the 8/2/18 treatment  He also told me once I send the medical records over to the [address] the continuing treatments would be paid and no more records would need to be sent  I will like to wait and see that we get paid for 8/2/18 before having her come in for more  I will keep you updated once I receive confirmation they got the records    Thanks  Ziggy

## 2018-08-22 NOTE — TELEPHONE ENCOUNTER
Called and spoke with patient  We are still waiting to hear from insurance after reviewing the records for the second week  Will hold off resuming treatment another week and reevaluate  Patient agreeable

## 2018-08-30 NOTE — TELEPHONE ENCOUNTER
Called and left a message for patient on her cell phone  Reviewed that we spoke with her insurance company yesterday and as of now decision is still pending for coverage on continued PTNS  Will hold off another week and reevaluate next week  Patient encouraged to call back if she had any questions

## 2018-09-05 NOTE — TELEPHONE ENCOUNTER
Called and left a message for patient today  We are unfortunately still awaiting a final answer from her insurance company into coverage for treatment  Therefore will need to cancel tomorrow's appt as well  Also reviewed with patient at this time it may make sense to cancel the remaining treatments and reschedule if/when we receive approval from her insurance company  Awaiting call back

## 2018-09-12 NOTE — TELEPHONE ENCOUNTER
Called and left a message for patient  We have still not heard from the insurance company as to whether or not treatment is covered  At this time will need to cancel remaining appts and we can reschedule once we get final approval from insurance  Encouraged the patient to call back if she had any questions or concerns

## 2018-09-17 ENCOUNTER — OFFICE VISIT (OUTPATIENT)
Dept: GYNECOLOGIC ONCOLOGY | Facility: CLINIC | Age: 63
End: 2018-09-17
Payer: COMMERCIAL

## 2018-09-17 VITALS
RESPIRATION RATE: 19 BRPM | WEIGHT: 290 LBS | DIASTOLIC BLOOD PRESSURE: 84 MMHG | BODY MASS INDEX: 58.46 KG/M2 | HEART RATE: 78 BPM | HEIGHT: 59 IN | SYSTOLIC BLOOD PRESSURE: 122 MMHG | TEMPERATURE: 98.1 F

## 2018-09-17 DIAGNOSIS — C54.1 ENDOMETRIAL CANCER (HCC): Primary | ICD-10-CM

## 2018-09-17 PROCEDURE — 99213 OFFICE O/P EST LOW 20 MIN: CPT | Performed by: OBSTETRICS & GYNECOLOGY

## 2018-09-17 NOTE — PROGRESS NOTES
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  Patient underwent her initial surgery on November 17, 2017  The patient was presented at our multidisciplinary tumor Board Conference and the plan was for observation only  Patient comes in today for her surveillance visit with no complaints  She denies vaginal bleeding of any kind  Endometrial cancer ([facility] [address] )    11/17/2017 Initial Diagnosis     Endometrial cancer ([facility] [address] )         12/19/2017 Surgery     Robotic assisted total laparoscopic hysterectomy with bilateral salpingo-oophorectomy and sentinel bilateral pelvic lymph node dissection  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 (4 4 x 3 2 cm tumor, 9 4/15 4mm invasion, NO LVSI, washings revealed atypical cellular changes)            Review of Systems    Patient Active Problem List   Diagnosis    S/P hysterectomy    Benign essential hypertension    Depression    Dyslipidemia    Endometrial cancer (HCC)    Obesity    Other abnormal glucose    Frequency of urination    Prediabetes    Vitamin D deficiency    Postoperative anemia     Social History     Social History    Marital status: Single     Spouse name: N/A    Number of children: N/A    Years of education: N/A     Occupational History    Not on file       Social History Main Topics    Smoking status: Never Smoker    Smokeless tobacco: Never Used    Alcohol use No    Drug use: No    Sexual activity: Not on file     Other Topics Concern    Not on file     Social History Narrative    No narrative on file     Past Medical History:   Diagnosis Date    Anemia     Depression     Diabetes mellitus ([facility] [address] [facility]     Hyperglycemia     vx type 2 dm -- last assessed 4/1/14; resolved 11/7/17    Hyperlipidemia     Hypertension     Obesity     last assessed 10/14/17; resolved 11/7/17       Current Outpatient Prescriptions:     Amphet-Dextroamphet 3-Bead ER 50 MG CP24, Take 50 mg by mouth Daily, Disp: , Rfl:     ARIPiprazole (ABILIFY) 10 mg tablet, Take 10 mg by mouth daily, Disp: , Rfl:     aspirin (ECOTRIN LOW STRENGTH) 81 mg EC tablet, Take 81 mg by mouth daily, Disp: , Rfl:     Biotin (BIOTIN 5000) 5 MG CAPS, Take by mouth, Disp: , Rfl:     cholecalciferol (VITAMIN D3) 1,000 units tablet, Take 1,000 Units by mouth daily, Disp: , Rfl:     cyclobenzaprine (FLEXERIL) 10 mg tablet, Take 1 tablet (10 mg total) by mouth daily at bedtime, Disp: 15 tablet, Rfl: 2    Ferrous Sulfate (IRON) 28 MG TABS, Take by mouth, Disp: , Rfl:     gemfibrozil (LOPID) 600 mg tablet, Take 600 mg by mouth daily  , Disp: , Rfl:     meloxicam (MOBIC) 15 mg tablet, Take 1 tablet (15 mg total) by mouth daily, Disp: 30 tablet, Rfl: 2    metFORMIN (GLUCOPHAGE) 500 mg tablet, Take 500 mg by mouth daily  , Disp: , Rfl:     Mirabegron (MYRBETRIQ PO), Take by mouth, Disp: , Rfl:     oxybutynin (DITROPAN-XL) 10 MG 24 hr tablet, Take 10 mg by mouth daily at bedtime, Disp: , Rfl:     quinapril (ACCUPRIL) 5 mg tablet, Take 5 mg by mouth daily at bedtime, Disp: , Rfl:     venlafaxine (EFFEXOR) 75 mg tablet, Take 75 mg by mouth 3 (three) times a day  , Disp: , Rfl:   Allergies   Allergen Reactions    Cephalosporins Other (See Comments)     RASH - BILATERAL ARMS PER RN     Vitals:    09/17/18 1459   BP: 122/84   Pulse: 78   Resp: 19   Temp: 98 1 °F (36 7 °C)       Labs:  CMP  Lab Results   Component Value Date     12/19/2017    K 4 1 12/19/2017    CL 98 05/11/2018    CO2 23 05/11/2018    BUN 12 05/11/2018    CREATININE 0 72 05/11/2018    GLUCOSE 219 (H) 12/19/2017    CALCIUM 7 3 (L) 12/19/2017    AST 92 (H) 12/19/2017 ALT 66 12/19/2017    ALKPHOS 99 12/19/2017    PROT 6 9 10/27/2017    BILITOT 0 3 10/27/2017    EGFR 99 12/19/2017       BMP  Lab Results   Component Value Date    GLUCOSE 219 (H) 12/19/2017    CALCIUM 7 3 (L) 12/19/2017     12/19/2017    K 4 1 12/19/2017    CO2 23 05/11/2018    CL 98 05/11/2018    BUN 12 05/11/2018    CREATININE 0 72 05/11/2018       Lipids  Lab Results   Component Value Date    CHOL 183 10/27/2017    CHOL 186 10/10/2016    CHOL 206 (H) 10/01/2015     Lab Results   Component Value Date    HDL 64 05/11/2018    HDL 63 10/27/2017    HDL 60 10/10/2016     Lab Results   Component Value Date    LDLCALC 106 (H) 10/27/2017    LDLCALC 109 (H) 10/10/2016    LDLCALC 121 (H) 10/01/2015     Lab Results   Component Value Date    TRIG 105 05/11/2018    TRIG 69 10/27/2017    TRIG 87 10/10/2016     No components found for: CHOLHDL    Hemoglobin A1C  Lab Results   Component Value Date    HGBA1C 5 6 03/12/2018       Fasting Glucose  No results found for: GLUF    Insulin     Thyroid  No results found for: TSH, N1HLABG, U1RWKPZ, THYROIDAB    Hepatic Function Panel  Lab Results   Component Value Date    ALT 66 12/19/2017    AST 92 (H) 12/19/2017    ALKPHOS 99 12/19/2017    BILITOT 0 3 10/27/2017       Celiac Disease Antibody Panel  No results found for: ENDOMYSIAL IGA, GLIADIN IGA, GLIADIN IGG, IGA, TISSUE TRANSGLUT AB, TTG IGA   Iron  No results found for: IRON, TIBC, FERRITIN      Physical Exam   Constitutional: She is oriented to person, place, and time  She appears well-developed and well-nourished  No distress  HENT:   Head: Normocephalic and atraumatic  Right Ear: External ear normal    Left Ear: External ear normal    Nose: Nose normal    Mouth/Throat: Oropharynx is clear and moist  No oropharyngeal exudate  Eyes: Conjunctivae and EOM are normal  Pupils are equal, round, and reactive to light  Right eye exhibits no discharge  Left eye exhibits no discharge  No scleral icterus     Neck: Normal range of motion  Neck supple  No JVD present  No tracheal deviation present  No thyromegaly present  Cardiovascular: Normal rate, regular rhythm, normal heart sounds and intact distal pulses  Exam reveals no gallop and no friction rub  No murmur heard  Pulmonary/Chest: Effort normal and breath sounds normal  No stridor  No respiratory distress  She has no wheezes  She has no rales  She exhibits no tenderness  Abdominal: Soft  Bowel sounds are normal  She exhibits no distension and no mass  There is no tenderness  There is no rebound and no guarding  Well healed incision(s)   Genitourinary: No vaginal discharge found  Genitourinary Comments: The vaginal cuff is well healed  Surgical absence of the cervix, uterus, bilateral fallopian tubes and ovaries  Musculoskeletal: Normal range of motion  She exhibits no edema, tenderness or deformity  Lymphadenopathy:     She has no cervical adenopathy  Neurological: She is alert and oriented to person, place, and time  She has normal reflexes  No cranial nerve deficit  She exhibits normal muscle tone  Coordination normal    Skin: Skin is warm and dry  No rash noted  She is not diaphoretic  No erythema  No pallor  Psychiatric: She has a normal mood and affect  Her behavior is normal  Judgment and thought content normal        Assessment  63year-old with 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

## 2018-09-18 ENCOUNTER — OFFICE VISIT (OUTPATIENT)
Dept: FAMILY MEDICINE CLINIC | Facility: CLINIC | Age: 63
End: 2018-09-18
Payer: COMMERCIAL

## 2018-09-18 VITALS
TEMPERATURE: 98.1 F | DIASTOLIC BLOOD PRESSURE: 70 MMHG | HEART RATE: 80 BPM | RESPIRATION RATE: 16 BRPM | SYSTOLIC BLOOD PRESSURE: 102 MMHG | WEIGHT: 229 LBS | HEIGHT: 59 IN | OXYGEN SATURATION: 97 % | BODY MASS INDEX: 46.16 KG/M2

## 2018-09-18 DIAGNOSIS — R73.03 PREDIABETES: ICD-10-CM

## 2018-09-18 DIAGNOSIS — E78.5 DYSLIPIDEMIA: ICD-10-CM

## 2018-09-18 DIAGNOSIS — Z12.11 SCREEN FOR COLON CANCER: Primary | ICD-10-CM

## 2018-09-18 DIAGNOSIS — D64.9 POSTOPERATIVE ANEMIA: ICD-10-CM

## 2018-09-18 DIAGNOSIS — I10 BENIGN ESSENTIAL HYPERTENSION: ICD-10-CM

## 2018-09-18 PROCEDURE — 3078F DIAST BP <80 MM HG: CPT | Performed by: INTERNAL MEDICINE

## 2018-09-18 PROCEDURE — 3008F BODY MASS INDEX DOCD: CPT | Performed by: INTERNAL MEDICINE

## 2018-09-18 PROCEDURE — 99214 OFFICE O/P EST MOD 30 MIN: CPT | Performed by: INTERNAL MEDICINE

## 2018-09-18 PROCEDURE — 3074F SYST BP LT 130 MM HG: CPT | Performed by: INTERNAL MEDICINE

## 2018-09-18 RX ORDER — QUINAPRIL 5 1/1
5 TABLET ORAL
Qty: 90 TABLET | Refills: 1 | Status: SHIPPED | OUTPATIENT
Start: 2018-09-18 | End: 2019-06-21 | Stop reason: SDUPTHER

## 2018-09-18 RX ORDER — GEMFIBROZIL 600 MG/1
600 TABLET, FILM COATED ORAL DAILY
Qty: 90 TABLET | Refills: 1 | Status: ON HOLD | OUTPATIENT
Start: 2018-09-18 | End: 2019-07-09 | Stop reason: SDUPTHER

## 2018-09-18 NOTE — PROGRESS NOTES
Assessment/Plan:         Diagnoses and all orders for this visit:    Screen for colon cancer  -     Ambulatory referral to Colorectal Surgery; Future    Dyslipidemia  -     gemfibrozil (LOPID) 600 mg tablet; Take 1 tablet (600 mg total) by mouth daily  -     Comprehensive metabolic panel; Future  -     Lipid panel; Future    Benign essential hypertension  -     quinapril (ACCUPRIL) 5 mg tablet; Take 1 tablet (5 mg total) by mouth daily at bedtime    Prediabetes  -     metFORMIN (GLUCOPHAGE) 500 mg tablet; Take 1 tablet (500 mg total) by mouth daily  -     Hemoglobin A1C; Future  -     Comprehensive metabolic panel; Future    Postoperative anemia  -     CBC; Future          Subjective:      Patient ID: Porfirio Coyle is a 66 y o  female  Pt in for bp check  She remains cancer free        The following portions of the patient's history were reviewed and updated as appropriate:   She  has a past medical history of Anemia; Depression; Diabetes mellitus ([facility] [address] ); Hyperglycemia; Hyperlipidemia; Hypertension; and Obesity  She   Patient Active Problem List    Diagnosis Date Noted    S/P hysterectomy 12/19/2017    Benign essential hypertension 12/19/2017    Obesity 12/19/2017    Other abnormal glucose 12/19/2017    Frequency of urination 12/19/2017    Prediabetes 12/19/2017    Vitamin D deficiency 12/19/2017    Postoperative anemia 12/05/2017    Endometrial cancer ([facility] [address] ) 11/29/2017    Dyslipidemia 04/01/2014    Depression 10/07/2013     She  has a past surgical history that includes Abdominal surgery; Colonoscopy; pr lap, radical hyst w/ tube&ov, node bx (N/A, 12/19/2017); pr lap,diagnostic abdomen (N/A, 12/19/2017); Cholecystectomy; Gastric bypass; Tonsillectomy; and Hysterectomy (Bilateral)  Her family history includes Bone cancer in her maternal grandfather; Hodgkin's lymphoma in her father; Other in her mother; Ovarian cancer in her mother  She  reports that she has never smoked   She has never used smokeless tobacco  She reports that she does not drink alcohol or use drugs  Current Outpatient Prescriptions   Medication Sig Dispense Refill    Amphet-Dextroamphet 3-Bead ER 50 MG CP24 Take 50 mg by mouth Daily      ARIPiprazole (ABILIFY) 10 mg tablet Take 10 mg by mouth daily      aspirin (ECOTRIN LOW STRENGTH) 81 mg EC tablet Take 81 mg by mouth daily      Biotin (BIOTIN 5000) 5 MG CAPS Take by mouth      cholecalciferol (VITAMIN D3) 1,000 units tablet Take 1,000 Units by mouth daily      cyclobenzaprine (FLEXERIL) 10 mg tablet Take 1 tablet (10 mg total) by mouth daily at bedtime 15 tablet 2    Ferrous Sulfate (IRON) 28 MG TABS Take by mouth      gemfibrozil (LOPID) 600 mg tablet Take 1 tablet (600 mg total) by mouth daily 90 tablet 1    meloxicam (MOBIC) 15 mg tablet Take 1 tablet (15 mg total) by mouth daily 30 tablet 2    metFORMIN (GLUCOPHAGE) 500 mg tablet Take 1 tablet (500 mg total) by mouth daily 180 tablet 1    Mirabegron (MYRBETRIQ PO) Take by mouth      oxybutynin (DITROPAN-XL) 10 MG 24 hr tablet Take 10 mg by mouth daily at bedtime      quinapril (ACCUPRIL) 5 mg tablet Take 1 tablet (5 mg total) by mouth daily at bedtime 90 tablet 1    venlafaxine (EFFEXOR) 75 mg tablet Take 75 mg by mouth 3 (three) times a day         No current facility-administered medications for this visit        Current Outpatient Prescriptions on File Prior to Visit   Medication Sig    Amphet-Dextroamphet 3-Bead ER 50 MG CP24 Take 50 mg by mouth Daily    ARIPiprazole (ABILIFY) 10 mg tablet Take 10 mg by mouth daily    aspirin (ECOTRIN LOW STRENGTH) 81 mg EC tablet Take 81 mg by mouth daily    Biotin (BIOTIN 5000) 5 MG CAPS Take by mouth    cholecalciferol (VITAMIN D3) 1,000 units tablet Take 1,000 Units by mouth daily    cyclobenzaprine (FLEXERIL) 10 mg tablet Take 1 tablet (10 mg total) by mouth daily at bedtime    Ferrous Sulfate (IRON) 28 MG TABS Take by mouth    meloxicam (MOBIC) 15 mg tablet Take 1 tablet (15 mg total) by mouth daily    Mirabegron (MYRBETRIQ PO) Take by mouth    oxybutynin (DITROPAN-XL) 10 MG 24 hr tablet Take 10 mg by mouth daily at bedtime    venlafaxine (EFFEXOR) 75 mg tablet Take 75 mg by mouth 3 (three) times a day       No current facility-administered medications on file prior to visit  She is allergic to cephalosporins       Review of Systems   Constitutional: Negative  HENT: Negative  Respiratory: Negative  Cardiovascular: Negative  Genitourinary: Negative for pelvic pain  Objective:      /70 (BP Location: Left arm, Patient Position: Sitting, Cuff Size: Large)   Pulse 80   Temp 98 1 °F (36 7 °C) (Tympanic)   Resp 16   Ht 4' 11" (1 499 m)   Wt 104 kg (229 lb)   SpO2 97%   BMI 46 25 kg/m²          Physical Exam   Constitutional: She appears well-developed and well-nourished  HENT:   Head: Normocephalic and atraumatic     Right Ear: External ear normal    Left Ear: External ear normal

## 2018-10-22 NOTE — TELEPHONE ENCOUNTER
Called and left a message for patient  Reviewed that a PTNS determination from her insurance company is still pending  Suggested scheduling a 2-3 month follow up for symptom reassessment  By then, hopefully, we would have an answer from her insurance company and if PTNS not an option we could review any other options she may have regarding symptom management at that time  Awaiting call back

## 2018-10-22 NOTE — TELEPHONE ENCOUNTER
This is still pending with the insurance spoke with Charis Rodriguez still pending for PTNS 8/2/2018 ref# [id number], not sure what you want to do    Thanks  Sigifredo Gill

## 2018-10-24 ENCOUNTER — TELEPHONE (OUTPATIENT)
Dept: UROLOGY | Facility: AMBULATORY SURGERY CENTER | Age: 63
End: 2018-10-24

## 2018-10-24 PROBLEM — Z12.11 SPECIAL SCREENING FOR MALIGNANT NEOPLASMS, COLON: Status: ACTIVE | Noted: 2018-10-24

## 2018-10-31 NOTE — TELEPHONE ENCOUNTER
Patient scheduled follow up 1/11/2019 for symptom reassessment and re discuss options should ptns not be covered  She did call her insurance yesterday for update and they informed her they were waiting for clinical notes which we had already sent  Will verify that these have been sent as well

## 2018-10-31 NOTE — TELEPHONE ENCOUNTER
Patient called back  Patient will be at this number for awhile [phone]  Please call back to get a hold of her

## 2018-10-31 NOTE — TELEPHONE ENCOUNTER
Yes I have sent the clinicals over to them spoke with several people all said still pending to me, not sure what else to do except to keep checking

## 2018-11-03 LAB
ALBUMIN SERPL-MCNC: 4.4 G/DL (ref 3.6–4.8)
ALBUMIN/GLOB SERPL: 1.7 {RATIO} (ref 1.2–2.2)
ALP SERPL-CCNC: 108 IU/L (ref 39–117)
ALT SERPL-CCNC: 14 IU/L (ref 0–32)
AST SERPL-CCNC: 12 IU/L (ref 0–40)
BASOPHILS # BLD AUTO: 0.1 X10E3/UL (ref 0–0.2)
BASOPHILS NFR BLD AUTO: 1 %
BILIRUB SERPL-MCNC: 0.2 MG/DL (ref 0–1.2)
BUN SERPL-MCNC: 12 MG/DL (ref 8–27)
BUN/CREAT SERPL: 17 (ref 12–28)
CALCIUM SERPL-MCNC: 9.7 MG/DL (ref 8.7–10.3)
CHLORIDE SERPL-SCNC: 102 MMOL/L (ref 96–106)
CHOLEST SERPL-MCNC: 193 MG/DL (ref 100–199)
CO2 SERPL-SCNC: 24 MMOL/L (ref 20–29)
CREAT SERPL-MCNC: 0.72 MG/DL (ref 0.57–1)
EOSINOPHIL # BLD AUTO: 0 X10E3/UL (ref 0–0.4)
EOSINOPHIL NFR BLD AUTO: 0 %
ERYTHROCYTE [DISTWIDTH] IN BLOOD BY AUTOMATED COUNT: 13.7 % (ref 12.3–15.4)
GLOBULIN SER-MCNC: 2.6 G/DL (ref 1.5–4.5)
GLUCOSE SERPL-MCNC: 108 MG/DL (ref 65–99)
HBA1C MFR BLD: 5.5 % (ref 4.8–5.6)
HCT VFR BLD AUTO: 37.6 % (ref 34–46.6)
HDLC SERPL-MCNC: 59 MG/DL
HGB BLD-MCNC: 12.8 G/DL (ref 11.1–15.9)
IMM GRANULOCYTES # BLD: 0 X10E3/UL (ref 0–0.1)
IMM GRANULOCYTES NFR BLD: 0 %
LABCORP COMMENT: NORMAL
LDLC SERPL CALC-MCNC: 116 MG/DL (ref 0–99)
LYMPHOCYTES # BLD AUTO: 2.1 X10E3/UL (ref 0.7–3.1)
LYMPHOCYTES NFR BLD AUTO: 23 %
MCH RBC QN AUTO: 28.9 PG (ref 26.6–33)
MCHC RBC AUTO-ENTMCNC: 34 G/DL (ref 31.5–35.7)
MCV RBC AUTO: 85 FL (ref 79–97)
MONOCYTES # BLD AUTO: 0.5 X10E3/UL (ref 0.1–0.9)
MONOCYTES NFR BLD AUTO: 6 %
NEUTROPHILS # BLD AUTO: 6.2 X10E3/UL (ref 1.4–7)
NEUTROPHILS NFR BLD AUTO: 70 %
PLATELET # BLD AUTO: 371 X10E3/UL (ref 150–379)
POTASSIUM SERPL-SCNC: 4.2 MMOL/L (ref 3.5–5.2)
PROT SERPL-MCNC: 7 G/DL (ref 6–8.5)
RBC # BLD AUTO: 4.43 X10E6/UL (ref 3.77–5.28)
SL AMB EGFR AFRICAN AMERICAN: 105 ML/MIN/1.73
SL AMB EGFR NON AFRICAN AMERICAN: 91 ML/MIN/1.73
SL AMB VLDL CHOLESTEROL CALC: 18 MG/DL (ref 5–40)
SODIUM SERPL-SCNC: 140 MMOL/L (ref 134–144)
TRIGL SERPL-MCNC: 88 MG/DL (ref 0–149)
WBC # BLD AUTO: 8.9 X10E3/UL (ref 3.4–10.8)

## 2018-11-23 DIAGNOSIS — R39.15 URINARY URGENCY: Primary | ICD-10-CM

## 2018-11-23 DIAGNOSIS — R35.0 URINARY FREQUENCY: Primary | ICD-10-CM

## 2018-11-23 RX ORDER — OXYBUTYNIN CHLORIDE 10 MG/1
TABLET, EXTENDED RELEASE ORAL
Qty: 30 TABLET | Refills: 11 | Status: SHIPPED | OUTPATIENT
Start: 2018-11-23 | End: 2019-09-29 | Stop reason: HOSPADM

## 2018-11-26 RX ORDER — MIRABEGRON 50 MG/1
TABLET, FILM COATED, EXTENDED RELEASE ORAL
Qty: 30 TABLET | Refills: 10 | Status: ON HOLD | OUTPATIENT
Start: 2018-11-26 | End: 2019-12-14 | Stop reason: SDUPTHER

## 2018-12-13 ENCOUNTER — ANESTHESIA EVENT (OUTPATIENT)
Dept: PERIOP | Facility: AMBULARY SURGERY CENTER | Age: 63
End: 2018-12-13

## 2018-12-28 ENCOUNTER — ANESTHESIA (OUTPATIENT)
Dept: PERIOP | Facility: AMBULARY SURGERY CENTER | Age: 63
End: 2018-12-28

## 2019-01-11 ENCOUNTER — TELEPHONE (OUTPATIENT)
Dept: UROLOGY | Facility: AMBULATORY SURGERY CENTER | Age: 64
End: 2019-01-11

## 2019-01-11 ENCOUNTER — OFFICE VISIT (OUTPATIENT)
Dept: UROLOGY | Facility: AMBULATORY SURGERY CENTER | Age: 64
End: 2019-01-11
Payer: COMMERCIAL

## 2019-01-11 VITALS
HEART RATE: 72 BPM | DIASTOLIC BLOOD PRESSURE: 80 MMHG | BODY MASS INDEX: 45.16 KG/M2 | HEIGHT: 59 IN | SYSTOLIC BLOOD PRESSURE: 120 MMHG | WEIGHT: 224 LBS

## 2019-01-11 DIAGNOSIS — N32.81 OAB (OVERACTIVE BLADDER): Primary | ICD-10-CM

## 2019-01-11 PROCEDURE — 99213 OFFICE O/P EST LOW 20 MIN: CPT | Performed by: NURSE PRACTITIONER

## 2019-01-11 RX ORDER — LISDEXAMFETAMINE DIMESYLATE 60 MG/1
60 CAPSULE ORAL DAILY
Refills: 0 | COMMUNITY
Start: 2018-12-05 | End: 2019-04-04

## 2019-01-11 NOTE — PROGRESS NOTES
1/11/2019    Sagar Stacyisidro Melendez  1955  [id number]        Assessment  -OAB    Discussion/Plan  Marly Haines is a 66 y o  female being managed by Dr Osmar Newell       -Patient continues to report episodes of urinary urgency and urge incontinence  Receive notification that patient's insurance finally paid for 2 PTNS treatments last summer  Patient states that her insurance has not changed  Spoke to [employer] in office, who states that patient's insurance will likely cover additional PTNS treatments  Will inform nurse to schedule patient  She will continue to take Myrbetriq and oxybutynin daily  We also reviewed dietary and behavioral modifications and avoiding bladder irritants such as iced tea as this can also be contributing factor   -Follow up with PTNS treatment  -All questions answered, patients agree with plan     History of Present Illness  66 y o  female with a history of OAB presents today for follow up  Patient continues to take myrbetriq 50mg and oxybutynin 10mg daily  She was initiated on PTNS in July 2018, however due to insurance coverage this was discontinued, and she only completed 2 of 12  She continues to wear 2-3 sanitary pads daily for episodes of urinary urge incontinence  Patient feels symptoms primarily occur during the daytime  She does admit to only drinking iced tea, and does not hydrate with water  She denies any episodes of gross hematuria or dysuria  No overall changes to health since last office visit  Review of Systems  Review of Systems   Constitutional: Negative  HENT: Negative  Respiratory: Negative  Cardiovascular: Negative  Gastrointestinal: Negative  Genitourinary: Positive for frequency and urgency  Negative for decreased urine volume, difficulty urinating, dysuria, flank pain and hematuria  Musculoskeletal: Negative  Skin: Negative  Neurological: Negative  Psychiatric/Behavioral: Negative          Past Medical History  Past Medical History: Diagnosis Date    Anemia     Depression     Diabetes mellitus ([facility] [address] )     Hyperglycemia     vx type 2 dm -- last assessed 4/1/14; resolved 11/7/17    Hyperlipidemia     Hypertension     Obesity     last assessed 10/14/17; resolved 11/7/17       Past Social History  Past Surgical History:   Procedure Laterality Date    ABDOMINAL SURGERY      GASTRIC BYPASS    CHOLECYSTECTOMY      at the time of gastric bypass    COLONOSCOPY      GASTRIC BYPASS      HYSTERECTOMY Bilateral     total abdominal with salpingo-oophorectomy    VA LAP, RADICAL HYST W/ TUBE&OV, NODE BX N/A 12/19/2017    Procedure: HYSTERECTOMY LAPAROSCOPIC TOTAL ([address]) W/ ROBOTICS; BILATERAL SALPINGOOPHERECTOMY; LYMPH NODE DISSECTION; lysis of adhesions;  Surgeon: Brigid Vivas MD;  [facility]: [facility];  Service: Gynecology Oncology    VA LAP,DIAGNOSTIC ABDOMEN N/A 12/19/2017    Procedure: LAPAROSCOPY DIAGNOSTIC;  Surgeon: Brigid Vivas MD;  [facility]: [facility];  Service: Gynecology Oncology    TONSILLECTOMY         Past Family History  Family History   Problem Relation Age of Onset    Other Mother         dyslipidemia    Ovarian cancer Mother     Hodgkin's lymphoma Father     Bone cancer Maternal Grandfather        Past Social history  Social History     Social History    Marital status: Single     Spouse name: N/A    Number of children: N/A    Years of education: N/A     Occupational History    Not on file       Social History Main Topics    Smoking status: Never Smoker    Smokeless tobacco: Never Used    Alcohol use No    Drug use: No    Sexual activity: Not on file     Other Topics Concern    Not on file     Social History Narrative    No narrative on file       Current Medications  Current Outpatient Prescriptions   Medication Sig Dispense Refill    Amphet-Dextroamphet 3-Bead ER 50 MG CP24 Take 50 mg by mouth Daily      ARIPiprazole (ABILIFY) 10 mg tablet Take 10 mg by mouth daily      aspirin (ECOTRIN LOW STRENGTH) 81 mg EC tablet Take 81 mg by mouth daily      Biotin (BIOTIN 5000) 5 MG CAPS Take by mouth      cholecalciferol (VITAMIN D3) 1,000 units tablet Take 1,000 Units by mouth daily      cyclobenzaprine (FLEXERIL) 10 mg tablet Take 1 tablet (10 mg total) by mouth daily at bedtime 15 tablet 2    Ferrous Sulfate (IRON) 28 MG TABS Take by mouth      gemfibrozil (LOPID) 600 mg tablet Take 1 tablet (600 mg total) by mouth daily 90 tablet 1    meloxicam (MOBIC) 15 mg tablet Take 1 tablet (15 mg total) by mouth daily 30 tablet 2    metFORMIN (GLUCOPHAGE) 500 mg tablet Take 1 tablet (500 mg total) by mouth daily 180 tablet 1    Mirabegron (MYRBETRIQ PO) Take by mouth      MYRBETRIQ 50 MG TB24 TAKE 1 TABLET DAILY 30 tablet 10    oxybutynin (DITROPAN-XL) 10 MG 24 hr tablet TAKE 1 TABLET BY MOUTH EVERY DAY 30 tablet 11    quinapril (ACCUPRIL) 5 mg tablet Take 1 tablet (5 mg total) by mouth daily at bedtime 90 tablet 1    venlafaxine (EFFEXOR) 75 mg tablet Take 75 mg by mouth 3 (three) times a day         No current facility-administered medications for this visit  Allergies  Allergies   Allergen Reactions    Cephalosporins Other (See Comments)     RASH - BILATERAL ARMS PER RN       Past medical history, social history, family history, medications and allergies were reviewed  Vitals  There were no vitals filed for this visit  Physical Exam  Physical Exam   Constitutional: She is oriented to person, place, and time  She appears well-developed and well-nourished  Obese     HENT:   Head: Normocephalic  Eyes: Pupils are equal, round, and reactive to light  Neck: Normal range of motion  Cardiovascular: Normal rate and regular rhythm  Pulmonary/Chest: Effort normal    Abdominal: Soft  Normal appearance  She exhibits no distension  There is no tenderness  There is no CVA tenderness     Genitourinary:   Genitourinary Comments: No suprapubic discomfort/distension   Musculoskeletal: Normal range of motion  Neurological: She is alert and oriented to person, place, and time  Skin: Skin is warm and dry  Psychiatric: She has a normal mood and affect  Her behavior is normal  Judgment and thought content normal        Results    I have personally reviewed all pertinent lab results and reviewed with patient  Lab Results   Component Value Date    GLUCOSE 219 (H) 12/19/2017    CALCIUM 7 3 (L) 12/19/2017     10/27/2017    K 4 2 11/01/2018    CO2 24 11/01/2018     11/01/2018    BUN 12 11/01/2018    CREATININE 0 61 12/19/2017     Lab Results   Component Value Date    WBC 8 9 11/01/2018    HGB 12 8 11/01/2018    HCT 37 6 11/01/2018    MCV 85 11/01/2018     11/01/2018     No results found for this or any previous visit (from the past 1 hour(s))

## 2019-01-11 NOTE — PATIENT INSTRUCTIONS
BLADDER HEALTH    WHAT IS CONSIDERED NORMAL?  The average bladder can hold about 2 cups of urine before it needs to be emptied   The normal range of voiding urine is 6 to 8 times during a 24 hour period  As we get older, our bladder capacity can get smaller and we may need to pass urine more frequently but usually not more than every 2 hours   Urine should flow easily without discomfort in a good, steady stream until the bladder is empty  No pushing or straining is necessary to empty the bladder   An urge is a signal that you feel as the bladder stretches to fill with urine  Urges can be felt even if the bladder is not full  Urges are not commands to go to the toilet, merely a signal and can be controlled  WHAT ARE GOOD BLADDER HABITS?  Take your time when emptying your bladder  Dont strain or push to empty your bladder  Make sure you empty your bladder completely each time you pass urine  Do not rush the process   Consistently ignoring the urge to go (waiting more than 4 hours between toileting) or urinating too infrequently may be convenient but not healthy for your bladder   Avoid going to the toilet just in case or more often than every 2 hours  It is usually not necessary to go when you feel the first urge  Try to go only when your bladder is full  Urgency and frequency of urination can be improved by retraining the bladder and spacing your fluid intake throughout the day  Practice good toilet habits  Dont let your bladder control your life  TIPS TO MAINTAIN GOOD BLADDER HABITS   Maintain a good fluid intake  Depending on your body size and environment, drink 6 -8 cups (8 oz each) of fluid per day unless otherwise advised by your doctor  Not enough fluid creates a foul odor and dark color of the urine     Limit the amount of caffeine (coffee, cola, chocolate or tea) and citrus foods that you consume as these foods can be associated with increased sensation of urinary urgency and frequency   Limit the amount of alcohol you drink  Alcohol increases urine production and makes it difficult for the brain to coordinate bladder control   Avoid constipation by maintaining a balanced diet of dietary fiber   Cigarette smoking is also irritating to the bladder surface and is associated with bladder cancer  In addition, the coughing associated with smoking may lead to increased incontinent episodes because of the increased pressure  HOW DIET CAN AFFECT YOUR BLADDER  Although there is no particular "diet" that can cure bladder control, there are certain dietary suggestions you can use to help control the problem  There are 2 points to consider when evaluating how your habits and diet may affect your bladder:    1  Foods and fluids can irritate the bladder  Some foods and beverages are thought to contribute to bladder leakage and irritability  However their effect on the bladder is not completely understood and you may want to see if eliminating one or all of these items improves your bladder control  If you are unable to give them up completely, it is recommended that you use the following items in moderation:   Acidic beverages and foods (orange juice, grapefruit juice, lemonade etc)   Alcoholic beverages   Vinegar   Coffee (regular and decaf)   Tea (regular and decaf)   Caffeinated beverages   Carbonated beverages          2  Drinking enough and the right kinds of fluids  Many people with bladder control issues decrease their intake of liquids in hope that they will need to urinate less frequently or have less urinary leakage   You should not restrict fluids to control your bladder  While a decrease in liquid intake does result in a decrease in the volume of urine, the smaller amount of urine may be more highly concentrated         Highly concentrated, dark yellow urine is irritating to the bladder surface and may actually cause you to go to the bathroom more frequently   It also encourages the growth of bacteria, which may lead to infections resulting in incontinence   Substitutions for Bladder Irritants: water is always the best beverage choice    Grape and apple juice are thirst quenchers are good selections and are not as irritating to the bladder   o Low acid fruits:  Pears, apricots, papaya, watermelon  o For coffee drinkers: KAVA®, Postum®, Farideh®, Kaffree Fatou®  o For tea drinkers:  non-citrus or herbal and sun brewed tea

## 2019-01-11 NOTE — TELEPHONE ENCOUNTER
Patient seen in office today  Per Charity Aguero, patient's insurance finally paid for 2 PTNS treatments in July and August 2018  Patient states she has same insurance  She would like confirmation that insurance will pay for future treatments  Please assist patient with scheduling

## 2019-01-14 NOTE — TELEPHONE ENCOUNTER
PTNS no auth needed same thing I was told last time spoke with Yaniv Lainez ref# [id number], I can not guarantee they will pay for the services but again patient should be watching for her EOBs to make sure they are and I will contact them if they are not again  I think she can continue the treatments    Thanks  Fidel Ku

## 2019-01-14 NOTE — TELEPHONE ENCOUNTER
Spoke with , patient will need to sign financial authorization form and we will continue sending documentation to ensure PTNS is covered  If in the end it is not covered patient would have to pay the difference  Unfortunately, when we attempted treatment last year insurance ended up paying for treatment but only after a few months and if for some reason they deny the claim patient would then be liable  Called and left a detailed message for patient today  Reviewed the above information and that again no pre authorization is required  Awaiting call back to ensure patient still interested in treatment and to schedule

## 2019-01-17 NOTE — TELEPHONE ENCOUNTER
Patient called back and would like to call her insurance herself before deciding how she wants to continue  She will call back once she decides or has an answer

## 2019-01-21 NOTE — TELEPHONE ENCOUNTER
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

## 2019-01-23 ENCOUNTER — PROCEDURE VISIT (OUTPATIENT)
Dept: UROLOGY | Facility: AMBULATORY SURGERY CENTER | Age: 64
End: 2019-01-23
Payer: COMMERCIAL

## 2019-01-23 VITALS
HEIGHT: 59 IN | SYSTOLIC BLOOD PRESSURE: 112 MMHG | DIASTOLIC BLOOD PRESSURE: 82 MMHG | BODY MASS INDEX: 45.56 KG/M2 | HEART RATE: 80 BPM | WEIGHT: 226 LBS

## 2019-01-23 DIAGNOSIS — R35.0 FREQUENCY OF URINATION: Primary | ICD-10-CM

## 2019-01-23 DIAGNOSIS — N39.41 URGE INCONTINENCE: ICD-10-CM

## 2019-01-23 DIAGNOSIS — R39.15 URINARY URGENCY: ICD-10-CM

## 2019-01-23 PROCEDURE — 64566 NEUROELTRD STIM POST TIBIAL: CPT

## 2019-01-23 NOTE — PROGRESS NOTES
1/23/2019    Chelsea Melendez  1955  [id number]    Diagnosis  Chief Complaint     Urinary Frequency; Urinary Urgency; Urinary Incontinence         Patient presents for PTNS 1 of 12 managed by Dr Lucien Kennedy  Patient will follow up in one week for her next treatment  Consent and financial waiver signed today  She was initially started in 7/2018 but there was a concern over insurance coverage so the patient stopped after two treatments  Since that time her insurance has paid for those treatments  Her baseline bladder diary is consistent with one day recorded recently prior to starting  Urinary Incontinence Screening      Most Recent Value   Urinary Incontinence   Urinary Incontinence? Yes [urge incontinence, not every day, 1-2 pads daily]   Incomplete emptying? No   Urinary frequency? Yes   Urinary urgency? No   Urinary hesitancy? No   Dysuria (painful difficult urination)? No   Nocturia (waking up to use the bathroom)? Yes [occ]   Straining (having to push to go)? No   Weak stream?  No   Intermittent stream?  No   Post void dribbling?   Yes [occ]          Patient Goals and Progress  Decreased Urgency Yes, secondary  Decreased Frequency Yes, primary goal  Episodes of incontinence Yes, tertiary  Sleep Through Night No  Related Health and Social Factors Yes, diabetes    Bladder Diary Summary:  Caffeine cups per day:3 large glasses of tea  Alcohol- number of drinks per day:none  Daytime voids:8  Nighttime voids:1  Urgency:average 4-5 on a scale of 1-5 with 5 being severe urge, she does note occ severe urge however which is bothersome  Incontinence- episodes per day: 1-2 pads per day, not every day, incontinence "goes in spurts" none on most recent bladder dairy      Randy Curran, RN   CURN, BSN

## 2019-01-30 ENCOUNTER — PROCEDURE VISIT (OUTPATIENT)
Dept: UROLOGY | Facility: AMBULATORY SURGERY CENTER | Age: 64
End: 2019-01-30
Payer: COMMERCIAL

## 2019-01-30 VITALS
WEIGHT: 224.4 LBS | HEIGHT: 59 IN | BODY MASS INDEX: 45.24 KG/M2 | HEART RATE: 68 BPM | SYSTOLIC BLOOD PRESSURE: 98 MMHG | DIASTOLIC BLOOD PRESSURE: 60 MMHG

## 2019-01-30 DIAGNOSIS — R35.0 FREQUENCY OF URINATION: ICD-10-CM

## 2019-01-30 PROCEDURE — 64566 NEUROELTRD STIM POST TIBIAL: CPT

## 2019-01-30 NOTE — PROGRESS NOTES
1/30/2019    Xena Muse Talakarenperla  1955  [id number]    Diagnosis  Chief Complaint     Urinary Frequency; Urinary Urgency; urge incontinence         Patient presents for PTNS 2 of 12 managed by Dr Parul Kramer  Patient will follow up next week          Procedure PTNS  Session 2 of 12    Ankle used: left  Treatment setting:  Duration of treatment: 30 minutes  Lead lot #: [id number]  Expiration Date: 05/31/2021  Feeling/Response: feeling up leg and at big toe    Patient Goals and Progress  Decreased Urgency Yes  Decreased Frequency Yes  Episodes of incontinence Yes  Sleep Through Night Yes  Related Health and Social Factors No      Treatment Plan  Increase Fluids No  Decrease Caffeine No  Decrease FluidsNo  Urge reduction techniques No  Kegels No  Timed voiding No  No fluids before bed No                Mya Watson RN

## 2019-02-06 ENCOUNTER — PROCEDURE VISIT (OUTPATIENT)
Dept: UROLOGY | Facility: AMBULATORY SURGERY CENTER | Age: 64
End: 2019-02-06
Payer: COMMERCIAL

## 2019-02-06 ENCOUNTER — DOCUMENTATION (OUTPATIENT)
Dept: UROLOGY | Facility: AMBULATORY SURGERY CENTER | Age: 64
End: 2019-02-06

## 2019-02-06 VITALS
WEIGHT: 224 LBS | HEIGHT: 59 IN | DIASTOLIC BLOOD PRESSURE: 68 MMHG | BODY MASS INDEX: 45.16 KG/M2 | SYSTOLIC BLOOD PRESSURE: 100 MMHG | HEART RATE: 64 BPM

## 2019-02-06 DIAGNOSIS — R35.0 FREQUENCY OF URINATION: ICD-10-CM

## 2019-02-06 PROCEDURE — 64566 NEUROELTRD STIM POST TIBIAL: CPT

## 2019-02-06 NOTE — LETTER
February 6, 2019     [address] [facility] [address]   [address] [facility]  Assembly Room- 2nd [address] [facility], [address] [facility]    Patient: Shania Kaufman   YOB: 1955   Date of Visit: 2/6/2019       Joshua Godfrey, is under my care  She has weekly treatments in my office on Wednesdays  that will last through until April 10, 2019  Please excuse her from jury duty      Sincerely,          Vicki Penn MD

## 2019-02-06 NOTE — PROGRESS NOTES
2019    Fredo Roney Lovettperla  1955  [id number]    Diagnosis  Chief Complaint     Urinary Frequency; oab         Patient presents for PTNS 3 of 12 managed by Dr Ernie Chew  Weekly treatments    Urinary Incontinence Screening      Most Recent Value   Urinary Incontinence   Urinary Incontinence?  -- [2 pads - worse]   Incomplete emptying? No   Urinary frequency? Yes   Urinary urgency? No [better]   Urinary hesitancy? No   Dysuria (painful difficult urination)? No   Nocturia (waking up to use the bathroom)? No   Straining (having to push to go)? No   Weak stream?  No   Intermittent stream?  No   Vaginal pressure? No   Vaginal dryness?   No            Procedure PTNS  Session 3 of 12    Ankle used: left  Treatment settin  Duration of treatment: 30 minutes  Lead lot #:[id number]  Expiration Date: 10/28/2020  Feeling/Response: up back of leg and insertion site    Patient Goals and Progress  Decreased Urgency Yes  Decreased Frequency No  Episodes of incontinence Yes  Sleep Through Night Yes  Related Health and Social Factors No      Treatment Plan  Increase Fluids No  Decrease Caffeine No  Decrease FluidsNo  Urge reduction techniques No  Kegels No  Timed voiding No  No fluids before bed Yes                Will Mejia RN

## 2019-02-13 ENCOUNTER — PROCEDURE VISIT (OUTPATIENT)
Dept: UROLOGY | Facility: AMBULATORY SURGERY CENTER | Age: 64
End: 2019-02-13
Payer: COMMERCIAL

## 2019-02-13 VITALS
BODY MASS INDEX: 46.07 KG/M2 | HEIGHT: 59 IN | HEART RATE: 68 BPM | DIASTOLIC BLOOD PRESSURE: 80 MMHG | SYSTOLIC BLOOD PRESSURE: 132 MMHG | WEIGHT: 228.5 LBS

## 2019-02-13 DIAGNOSIS — R39.15 URINARY URGENCY: ICD-10-CM

## 2019-02-13 DIAGNOSIS — R35.0 FREQUENCY OF URINATION: ICD-10-CM

## 2019-02-13 PROCEDURE — 64566 NEUROELTRD STIM POST TIBIAL: CPT

## 2019-02-13 NOTE — PROGRESS NOTES
2019    Ismaelwicho Rivas Al  1955  [id number]    Diagnosis  Chief Complaint     Urinary Frequency; Urinary Urgency         Patient presents for PTNS 4 of 12 managed by Dr Carlito Mckeon appointment          Procedure PTNS  Session 4 of 12    Ankle used: left  Treatment settin  Duration of treatment: 30 minutes  Lead lot #:[id number]  Expiration Date: 2021  Feeling/Response: Big toe    Patient Goals and Progress  Decreased Urgency Yes  Decreased Frequency No  Episodes of incontinence No  Sleep Through Night Yes  Related Health and Social Factors No        Treatment Plan  Increase Fluids No  Decrease Caffeine Yes  Decrease FluidsYes  Urge reduction techniques No  Kegels No  Timed voiding No  No fluids before bed No                Jen Riddle RN

## 2019-02-20 ENCOUNTER — PROCEDURE VISIT (OUTPATIENT)
Dept: UROLOGY | Facility: AMBULATORY SURGERY CENTER | Age: 64
End: 2019-02-20
Payer: COMMERCIAL

## 2019-02-20 VITALS
HEIGHT: 59 IN | HEART RATE: 88 BPM | DIASTOLIC BLOOD PRESSURE: 70 MMHG | WEIGHT: 225.2 LBS | BODY MASS INDEX: 45.4 KG/M2 | SYSTOLIC BLOOD PRESSURE: 110 MMHG

## 2019-02-20 DIAGNOSIS — R39.15 URINARY URGENCY: ICD-10-CM

## 2019-02-20 DIAGNOSIS — N39.41 URGE INCONTINENCE: ICD-10-CM

## 2019-02-20 DIAGNOSIS — R35.0 FREQUENCY OF URINATION: ICD-10-CM

## 2019-02-20 PROCEDURE — 64566 NEUROELTRD STIM POST TIBIAL: CPT

## 2019-02-20 NOTE — PROGRESS NOTES
2/20/2019    William Melendez  1955  [id number]    Diagnosis  Chief Complaint     Urinary Frequency; Urinary Urgency; Urinary Incontinence         Patient presents for PTNS 5 of 12 managed by Dr Sharleen Curling  Patient will follow up in one week for next treatment  Urinary Incontinence Screening      Most Recent Value   Urinary Incontinence   Urinary Incontinence? Yes [1 accident last week, 1 pad a day usually not needed]   Incomplete emptying? No   Urinary frequency? Yes   Urinary urgency? No [better, more warning, not running as much]   Urinary hesitancy? No   Dysuria (painful difficult urination)? No   Nocturia (waking up to use the bathroom)? No   Straining (having to push to go)? No   Weak stream?  No   Intermittent stream?  No   Post void dribbling? Yes [slight]            Procedure PTNS  Session 5 of 12    Ankle used: left  Treatment setting:15  Duration of treatment: 30 minutes  Lead lot #:[id number]  Expiration Date:06/30/2021  Feeling/Response:needle site and bottom of the foot  Patient Goals and Progress  Decreased Urgency Yes  Decreased Frequency No  Episodes of incontinence Yes  Sleep Through Night Yes  Related Health and Social Factors Yes, diabetes      Patient has noted some improvement in urgency and incontinence though she notes her frequency is about the same thus far       Gilles Cortes, JACQUE BoboN

## 2019-02-27 ENCOUNTER — PROCEDURE VISIT (OUTPATIENT)
Dept: UROLOGY | Facility: AMBULATORY SURGERY CENTER | Age: 64
End: 2019-02-27
Payer: COMMERCIAL

## 2019-02-27 VITALS
WEIGHT: 225.2 LBS | SYSTOLIC BLOOD PRESSURE: 110 MMHG | BODY MASS INDEX: 45.4 KG/M2 | HEIGHT: 59 IN | HEART RATE: 100 BPM | DIASTOLIC BLOOD PRESSURE: 68 MMHG

## 2019-02-27 DIAGNOSIS — R35.0 FREQUENCY OF URINATION: ICD-10-CM

## 2019-02-27 DIAGNOSIS — N39.41 URGE INCONTINENCE: ICD-10-CM

## 2019-02-27 DIAGNOSIS — R39.15 URINARY URGENCY: ICD-10-CM

## 2019-02-27 PROCEDURE — 64566 NEUROELTRD STIM POST TIBIAL: CPT | Performed by: UROLOGY

## 2019-02-27 NOTE — PROGRESS NOTES
2019    Evelyn Melendez  1955  [id number]    Diagnosis  Chief Complaint     Urinary Frequency; Urinary Urgency; Urinary Incontinence         Patient presents for PTNS 6 of 12 managed by Dr Jaime Evangelista  Patient will follow up in one week for next visit  Urinary Incontinence Screening      Most Recent Value   Urinary Incontinence   Urinary Incontinence? Yes [2 pads, 3 accidents all week]   Incomplete emptying? No   Urinary frequency? Yes   Urinary urgency? Yes [still present but improving]   Urinary hesitancy? No   Dysuria (painful difficult urination)? No   Nocturia (waking up to use the bathroom)? No   Straining (having to push to go)? No   Weak stream?  No   Intermittent stream?  No   Post void dribbling? Yes            Procedure PTNS  Session 6 of 12    Ankle used: left  Treatment settin  Duration of treatment: 30 minutes  Lead lot #:[id number]  Expiration Date:2021  Feeling/Response: sole of the foot sensation and needle site sensation    Patient unsure about results at this point, she does report some better days and some worse days      DANISH Pugh, BSN

## 2019-03-06 ENCOUNTER — PROCEDURE VISIT (OUTPATIENT)
Dept: UROLOGY | Facility: AMBULATORY SURGERY CENTER | Age: 64
End: 2019-03-06
Payer: COMMERCIAL

## 2019-03-06 VITALS
SYSTOLIC BLOOD PRESSURE: 130 MMHG | HEIGHT: 59 IN | WEIGHT: 225 LBS | HEART RATE: 70 BPM | BODY MASS INDEX: 45.36 KG/M2 | DIASTOLIC BLOOD PRESSURE: 84 MMHG

## 2019-03-06 DIAGNOSIS — N39.41 URGE INCONTINENCE: ICD-10-CM

## 2019-03-06 DIAGNOSIS — R39.15 URINARY URGENCY: ICD-10-CM

## 2019-03-06 DIAGNOSIS — R35.0 FREQUENCY OF URINATION: ICD-10-CM

## 2019-03-06 DIAGNOSIS — R35.0 URINARY FREQUENCY: Primary | ICD-10-CM

## 2019-03-06 DIAGNOSIS — R32 URINARY INCONTINENCE, UNSPECIFIED TYPE: ICD-10-CM

## 2019-03-06 PROCEDURE — 64566 NEUROELTRD STIM POST TIBIAL: CPT

## 2019-03-06 NOTE — PROGRESS NOTES
3/6/2019    Evelyn Melendez  1955  [id number]    Diagnosis  Chief Complaint     Urinary Frequency; Urinary Urgency; Urinary Incontinence         Patient presents for PTNS  managed by Dr Jaime Evangelista  Patient will follow up next week for next PTNS  Urinary Incontinence Screening      Most Recent Value   Urinary Incontinence   Urinary Incontinence? Yes [one]   Incomplete emptying? Yes   Urinary frequency? Yes   Urinary urgency? No [improved]   Urinary hesitancy? No   Dysuria (painful difficult urination)? No   Nocturia (waking up to use the bathroom)? No   Straining (having to push to go)? No   Weak stream?  No   Intermittent stream?  No   Post void dribbling? Yes   Vaginal pressure? No   Vaginal dryness?   No             Posterior Tibial Nerve Stimulation     Date/Time 3/6/2019 3:20 PM     Performed by  Puneet Medel RN     Authorized by Jag Delgado MD                Procedure PTNS  Session 7     Ankle used: left  Treatment settin  Duration of treatment: 30 minutes  Lead lot #:[id number]  Expiration Date:21  Feeling/Response:in sole of foot    Patient Goals and Progress  Decreased Urgency Yes  Decreased Frequency Yes  Episodes of incontinence Yes  Sleep Through Night Yes  Related Health and Social Factors No

## 2019-03-13 ENCOUNTER — PROCEDURE VISIT (OUTPATIENT)
Dept: UROLOGY | Facility: AMBULATORY SURGERY CENTER | Age: 64
End: 2019-03-13
Payer: COMMERCIAL

## 2019-03-13 VITALS
BODY MASS INDEX: 45.36 KG/M2 | DIASTOLIC BLOOD PRESSURE: 76 MMHG | HEIGHT: 59 IN | HEART RATE: 72 BPM | SYSTOLIC BLOOD PRESSURE: 120 MMHG | WEIGHT: 225 LBS

## 2019-03-13 DIAGNOSIS — R35.0 URINARY FREQUENCY: ICD-10-CM

## 2019-03-13 DIAGNOSIS — R32 URINARY INCONTINENCE, UNSPECIFIED TYPE: ICD-10-CM

## 2019-03-13 DIAGNOSIS — N39.41 URGE INCONTINENCE: ICD-10-CM

## 2019-03-13 DIAGNOSIS — R39.15 URINARY URGENCY: ICD-10-CM

## 2019-03-13 DIAGNOSIS — R35.0 FREQUENCY OF URINATION: ICD-10-CM

## 2019-03-13 PROBLEM — E66.01 CLASS 3 SEVERE OBESITY WITH BODY MASS INDEX (BMI) OF 45.0 TO 49.9 IN ADULT (HCC): Status: ACTIVE | Noted: 2017-12-19

## 2019-03-13 PROBLEM — E66.813 CLASS 3 SEVERE OBESITY WITH BODY MASS INDEX (BMI) OF 45.0 TO 49.9 IN ADULT (HCC): Status: ACTIVE | Noted: 2017-12-19

## 2019-03-13 PROCEDURE — 64566 NEUROELTRD STIM POST TIBIAL: CPT

## 2019-03-13 NOTE — PROGRESS NOTES
3/13/2019    Jaelyn Melendez  1955  [id number]    Diagnosis  Chief Complaint     Urinary Frequency; Urinary Urgency; Urinary Incontinence         Patient presents for PTNS 8 of 12 managed by Dr Aurea Pak  Patient will follow up in one week for next PTNS  Urinary Incontinence Screening      Most Recent Value   Urinary Incontinence   Urinary Incontinence? Yes [two since last week]   Incomplete emptying? No   Urinary frequency? Yes   Urinary urgency? Yes   Urinary hesitancy? No   Dysuria (painful difficult urination)? No   Nocturia (waking up to use the bathroom)? No   Straining (having to push to go)? No   Weak stream?  No   Intermittent stream?  No   Post void dribbling? No   Vaginal pressure? No   Vaginal dryness? No            Procedure PTNS  Session 8 of 12    Ankle used: left  Treatment settin  Duration of treatment: 30 minutes  Lead lot #:[id number]  Expiration Date:21  Feeling/Response:medial to bottom of foot       Posterior Tibial Nerve Stimulation     Date/Time 3/13/2019 3:59 PM     Performed by  Mohan Blanchard RN     Authorized by Je Hameed MD       Consent: Verbal consent obtained    Consent given by: patient  Patient understanding: patient does not state understanding of the procedure being performed                 Mohan Blanchard RN

## 2019-03-17 PROBLEM — Z85.42 HISTORY OF ENDOMETRIAL CANCER: Status: ACTIVE | Noted: 2017-11-29

## 2019-03-17 PROBLEM — Z08 ENCOUNTER FOR FOLLOW-UP EXAMINATION AFTER COMPLETED TREATMENT FOR MALIGNANT NEOPLASM: Status: ACTIVE | Noted: 2019-03-17

## 2019-03-19 ENCOUNTER — OFFICE VISIT (OUTPATIENT)
Dept: GYNECOLOGIC ONCOLOGY | Facility: CLINIC | Age: 64
End: 2019-03-19
Payer: COMMERCIAL

## 2019-03-19 VITALS
HEIGHT: 59 IN | DIASTOLIC BLOOD PRESSURE: 78 MMHG | HEART RATE: 72 BPM | SYSTOLIC BLOOD PRESSURE: 120 MMHG | RESPIRATION RATE: 16 BRPM | BODY MASS INDEX: 45.36 KG/M2 | WEIGHT: 225 LBS

## 2019-03-19 DIAGNOSIS — Z08 ENCOUNTER FOR FOLLOW-UP EXAMINATION AFTER COMPLETED TREATMENT FOR MALIGNANT NEOPLASM: Primary | ICD-10-CM

## 2019-03-19 DIAGNOSIS — Z85.42 HISTORY OF ENDOMETRIAL CANCER: ICD-10-CM

## 2019-03-19 PROCEDURE — 99212 OFFICE O/P EST SF 10 MIN: CPT | Performed by: OBSTETRICS & GYNECOLOGY

## 2019-03-19 NOTE — PROGRESS NOTES
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 currently without evidence of disease  The patient will return in 6 months for her next surveillance visit  Encounter for follow-up examination after completed treatment for malignant neoplasm - Primary        CHIEF COMPLAINT: "I am here for follow-up "    Problem:  Cancer Staging  History of endometrial cancer  Staging form: Corpus Uteri - Carcinoma, AJCC 7th Edition  - Clinical stage from 12/19/2017: FIGO Stage IB (T1b, N0, M0) - Signed by Liz Johnson MD on 2/12/2018    Previous therapy:     History of endometrial cancer    11/17/2017 Initial Diagnosis     Endometrial cancer ([address] )         12/19/2017 Surgery     Robotic assisted total laparoscopic hysterectomy with bilateral salpingo-oophorectomy and sentinel bilateral pelvic lymph node dissection  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 (4 4 x 3 2 cm tumor, 9 4/15 4mm invasion, NO LVSI, washings revealed atypical cellular changes)         12/19/2017 Genetic Testing     Morrison testing negative          Patient ID: Xander Villela is a 66 y o  female  HPI   The patient is a delightful 14-year-old with stage IB 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  Patient underwent her initial surgery on December 19, 2017  Patient was then presented at our multidisciplinary tumor board conference and the plan was for close clinical follow-up  The patient did not require adjuvant treatment  She has no new complaints today  No vaginal bleeding, abdominal/pelvic pain  Normal bowel and bladder function  No interval change in medical history since last visit  Quality of life is good      The following portions of the patient's history were reviewed and updated as appropriate: allergies, current medications, past family history, past medical history, past social history, past surgical history and problem list     Review of Systems    Current Outpatient Medications   Medication Sig Dispense Refill    ARIPiprazole (ABILIFY) 10 mg tablet Take 10 mg by mouth daily      aspirin (ECOTRIN LOW STRENGTH) 81 mg EC tablet Take 81 mg by mouth daily      Biotin (BIOTIN 5000) 5 MG CAPS Take by mouth      cholecalciferol (VITAMIN D3) 1,000 units tablet Take 1,000 Units by mouth daily      Ferrous Sulfate (IRON) 28 MG TABS Take by mouth      gemfibrozil (LOPID) 600 mg tablet Take 1 tablet (600 mg total) by mouth daily 90 tablet 1    metFORMIN (GLUCOPHAGE) 500 mg tablet Take 1 tablet (500 mg total) by mouth daily 180 tablet 1    MYRBETRIQ 50 MG TB24 TAKE 1 TABLET DAILY 30 tablet 10    oxybutynin (DITROPAN-XL) 10 MG 24 hr tablet TAKE 1 TABLET BY MOUTH EVERY DAY 30 tablet 11    quinapril (ACCUPRIL) 5 mg tablet Take 1 tablet (5 mg total) by mouth daily at bedtime 90 tablet 1    venlafaxine (EFFEXOR) 75 mg tablet Take 75 mg by mouth 3 (three) times a day        VYVANSE 60 MG capsule Take 60 mg by mouth daily  0     No current facility-administered medications for this visit  Objective:    Blood pressure 120/78, pulse 72, resp  rate 16, height 4' 11" (1 499 m), weight 102 kg (225 lb)  Body mass index is 45 44 kg/m²  Body surface area is 1 94 meters squared  Physical Exam   Constitutional: She is oriented to person, place, and time  No distress  Pulmonary/Chest: Effort normal and breath sounds normal  No respiratory distress  She has no wheezes  She has no rales  She exhibits no tenderness  Abdominal: Soft  Bowel sounds are normal  She exhibits no distension and no mass  There is no tenderness  There is no rebound and no guarding  Well healed incision(s)   Genitourinary:   Genitourinary Comments: -Normal external female genitalia, normal Bartholin's and High [place]man[place]'s glands  -Normal midline urethral meatus   No lesions notes  -Bladder without fullness mass or tenderness  -Vagina without lesion or discharge No significant cystocele or rectocele noted  -Cervix surgically absent  -Uterus surgically absent  -Adnexae surgically absent  -Anus without fissure of lesion     Neurological: She is alert and oriented to person, place, and time  Skin: She is not diaphoretic  No results found for:   Lab Results   Component Value Date     10/27/2017    K 4 2 11/01/2018     11/01/2018    CO2 24 11/01/2018    BUN 12 11/01/2018    CREATININE 0 61 12/19/2017    GLUCOSE 219 (H) 12/19/2017    CALCIUM 7 3 (L) 12/19/2017    AST 12 11/01/2018    ALT 14 11/01/2018    ALKPHOS 99 12/19/2017    PROT 6 9 10/27/2017    BILITOT 0 3 10/27/2017    EGFR 99 12/19/2017     Lab Results   Component Value Date    WBC 8 9 11/01/2018    HGB 12 8 11/01/2018    HCT 37 6 11/01/2018    MCV 85 11/01/2018     11/01/2018     Lab Results   Component Value Date    NEUTROABS 6 2 11/01/2018     Addendum 2   MLH1 Promoter Region Methylation Assay:  Results  MLH1 promoter region methylation assay: Methylation of the HMLH1 promoter region detected     Comment: This phenotype is often associated with hereditary microsatellite instability (or MSI-H) colorectal carcinoma, also   known asHereditary Non-Polyposis Colorectal Cancer (HNPCC) or Morrison syndrome  It is rarely demonstrated in   sporadic (non-hereditary)MSI-H colorectal carcinoma  Genetic counseling is recommended to discuss the   implications for other family members      Interpretive Information  Determination of the methylation status of the hMLH1 gene promoter in patients with microsatellite instable   (MSI-H) colorectal carcinomas helps in discriminating sporadic from hereditary non-polyposis colon cancer   (HNPCC or Morrison syndrome)  The hMLH1 gene is located at chromosome 7f71-42 and is part of the DNA   mismatch-repair system; epigenetic, non-germline methylation of its promoter region is one of the   possible mechanisms for transcription silencing   The final result of this aberration, MSI-H phenotype, is  functionally similar to the presence of loss-of-function germline mutation in the coding region of the gene,   identifiable in patients with HNPCC  Mutations of the BRAF gene (such as V600F) do not participate in   carcinogenesis in [address]- associated MSI-H patients; however, they are frequently detected in sporadic   MSI-H colorectal cancers  Sporadic MSI-H colorectal cancers represent up to 15% of the newly   diagnosed colorectal carcinomas, whereas HNPCC patients account for 2-5% of the cases  Sporadic   MSI-H colorectal carcinomas are considered to be related to serrated adenomas (serrated pathway of   carcinogenesis)  Tumors with MSI-H are usually diploid, and characterized by proximal location,   Mucinous adenocarcinoma or medullary/undifferentiated carcinoma morphology, and marked   lymphocytic infiltration  Patients with MSI-H tumors demonstrate longer overall survival and higher rates   of 5-year disease-free survival    The results of the assay should be interpreted in conjunction with relevant clinical, laboratory and   demographic data, since a subset of HNPCC patients also demonstrates methylation of the hMLH1   promoter (epimutation), and low levels of methylation, as well aschanges outside of the tested region,   cannot be detected with the current test   Methylation levels above 10% are reported as positive  The lower limit of detection of this assay is 5%   (e g , 5% tumor in block)      Note: Specimen ID: [id number]  Specimen: Uterus (endometrial carcinoma)  Interpreted by: AFSHAN Rivas , [facility], [facility] [address]   Addendum electronically signed by Radha Butler MD on 1/31/2018 at  9:59 AM   Addendum   SOLID TUMOR IMMUNOHISTOCHEMICAL ANALYSIS  Endometrial carcinoma - block C15     INTERPRETATION:  LOSS OF NUCLEAR EXPRESSION OF MLH1 AND PMS2 EXPRESSION ARE SUGGESTIVE OF AN MLH1 GENE DEFECT      RESULTS:  Antibody Clone                 Description                                               Results  MLH1                 [id number]          Mismatch repair protein                            Loss of nuclear expression  MSH2                [id number]        Mismatch repair protein                            Intact nuclear expression  MSH6                40                      Mismatch repair protein                            Intact nuclear expression  PMS2                 MRQ-28             Mismatch repair protein                            Loss of nuclear expression     Comment:   Correlation with results of PCR is recommended      A negative control and a positive control for each antibody have been reviewed and accepted      [facility] Specimen ID: [id number]  Evaluator:  AFSHAN Brady, [address] [place], [place]     These tests were developed and their performance characteristics determined by [facility]  They may not be cleared or approved by the [place]  Food and Drug Administration  The FDA determined that   such clearance or approval is not necessary  These tests are used for clinical purposes  They should not   be regarded as investigational or for research  This laboratory has been approved by [facility], designated   as a high-complexity laboratory and is qualified to perform these tests      Comments:  Patients whose tumors demonstrate lack of expression of one or more DNA mismatch repair proteins might be   at risk for Morrison Syndrome  This cancer susceptibility syndrome greatly increases the risk of synchronous   and/or metachronous cancers in the affected patients and their family members   Normal expression of all   proteins does not completely rule out familial cancer predisposition      The [facility] Morrison Syndrome Surveillance Program Task Forces recommends that all patients with lack of   expression of one or more DNA mismatch repair proteins and those with concerning personal or family history   should undergo thorough evaluation, counseling and possibly genetic testing      Please contact Louie Valles, [address] [facility] ([facility] Certified Genetic Counselor) at [phone] if you have   questions or wish to schedule an appointment for this patient  Addendum electronically signed by Madalyn Acuña MD on 2018 at  3:14 PM   Final Diagnosis   Cancer synoptic template  Carcinoma of Endometrium (2016 CAP Tumor Template)  1  Specimen identification:     - Specimen: uterus with cervix and bilateral fallopian tubes and ovaries, and bilateral pelvic lymph node        dissection     - Procedure: robotic laparoscopic total hysterectomy and bilateral salpingo-oophorectomy and bilateral pelvic        lymph node dissection     - Specimen integrity: intact   2  Lymph node sampling: yes (parts A, B and D)   3  Tumor     - Tumor site: anterior and posterior endometrium     - Tumor size: 4 4 x 3 2 cm     - Histologic type: endometrial adenocarcinoma, endometrioid type     - Histologic grade (G1): FIGO grade 1     - Myometrial invasion (pT1b): carcinoma invades greater than 50% of myometrium:       -- Depth of invasion: 9 4 mm (61%)       -- Myometrial thickness: 15 4 mm     - Involvement of cervix: not involved     - Extent of involvement of other organs: carcinoma involves lower uterine segment but does not involve        cervix  Bilateral fallopian tubes and ovaries negative for malignancy   4  Peritoneal washings or ascites fluid: [id number] - atypical cellular changes, see report   5  Margins: negative   6  Lymph-vascular invasion: no definite lymphovascular space invasion identified  Artifactual displacement        of neoplastic epithelium in lymphovascular spaces noted   7  Regional lymph nodes (pN0): all benign lymph nodes   8  Additional pathologic findings: leiomyoma (0 5 cm in greatest dimension), benign endocervical polyp   9  Ancillary studies: none performed    Block C15 is suitable for additional studies if requested  10  Clinical history:      - Morrison syndrome: N/A      - Other: Endometrial carcinoma  11  7th Ed AJCC Stage:  TNM/FIGO Stage IB   - pT1b, pN0, pMX, G1     A, D  Right pelvic lymph nodes:     - Six lymph nodes, negative for metastatic carcinoma (0/6)      B  Left pelvic lymph nodes:     - Six lymph nodes, negative for metastatic carcinoma (0/6)      C  Uterus with cervix (84 grams) and bilateral fallopian tubes and ovaries:     - Endometrial adenocarcinoma, endometrioid type, FIGO grade 1  Electronically signed by Britton Trinidad MD on 12/22/2017 at  3:01 PM   Comments: This is an appended report  These results have been appended to a previously preliminary verified report  Note  [facility]   Intradepartmental consultation is in agreement      Interpretation performed at [facility], [address]   Comment: This is an appended report  These results have been appended to a previously preliminary verified report  Evgeny Castano MD, PhD, Luiz Monsalve  Attending Physician, [address] [place] [address]

## 2019-03-19 NOTE — ASSESSMENT & PLAN NOTE
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

## 2019-03-20 ENCOUNTER — PROCEDURE VISIT (OUTPATIENT)
Dept: UROLOGY | Facility: AMBULATORY SURGERY CENTER | Age: 64
End: 2019-03-20
Payer: COMMERCIAL

## 2019-03-20 VITALS
HEIGHT: 59 IN | BODY MASS INDEX: 45.56 KG/M2 | SYSTOLIC BLOOD PRESSURE: 128 MMHG | WEIGHT: 226 LBS | DIASTOLIC BLOOD PRESSURE: 84 MMHG | HEART RATE: 76 BPM

## 2019-03-20 DIAGNOSIS — N39.41 URGE INCONTINENCE: ICD-10-CM

## 2019-03-20 DIAGNOSIS — R35.0 FREQUENCY OF URINATION: ICD-10-CM

## 2019-03-20 DIAGNOSIS — R39.15 URINARY URGENCY: ICD-10-CM

## 2019-03-20 PROCEDURE — 99213 OFFICE O/P EST LOW 20 MIN: CPT | Performed by: NURSE PRACTITIONER

## 2019-03-20 PROCEDURE — 64566 NEUROELTRD STIM POST TIBIAL: CPT

## 2019-03-20 NOTE — PROGRESS NOTES
3/20/2019    Yasmin Melendez  1955  [id number]    Diagnosis  Chief Complaint     Overactive Bladder; Urinary Frequency; Urinary Incontinence; Urinary Urgency         Patient presents for PTNS  of 12 managed by Dr Clif Alfonso  Patient will return next week for next PTNS    Urinary Incontinence Screening      Most Recent Value   Urinary Incontinence   Urinary Incontinence? Yes [1x since last week "because i waited too long to go"]   Incomplete emptying? No   Urinary frequency? No   Urinary urgency? No   Urinary hesitancy? No   Dysuria (painful difficult urination)? No   Nocturia (waking up to use the bathroom)? Yes [once a night on average]   Straining (having to push to go)? No   Weak stream?  No   Intermittent stream?  No   Post void dribbling? Yes   Vaginal pressure? No   Vaginal dryness? No            Procedure PTNS  Session 9 of 12    Ankle used: left  Treatment settin  Duration of treatment: 30 minutes  Lead lot #:[id number]  Expiration Date:21  Feeling/Response:at site, sole of foot         Posterior Tibial Nerve Stimulation     Date/Time 3/20/2019 3:14 PM     Performed by  Lo Christiansen RN     Authorized by Dianne Agustin MD       Consent: Verbal consent obtained    Consent given by: patient               Bladder Diary Summary:  Daytime voids:4-5  Nighttime voids:1  Urgency:3  Incontinence- episodes per day:3        Lo Christiansen RN

## 2019-03-20 NOTE — PROGRESS NOTES
3/20/2019      Chief Complaint   Patient presents with    Overactive Bladder     PTNS    Urinary Frequency    Urinary Incontinence    Urinary Urgency     Assessment and Plan    66 y o  female managed by Dr Arnold Re    1  Urinary frequency and urgency  · The patient currently on her 9th out of 12 sessions of PTNS sessions  · She reports that she thinks the sessions are going well with mild improvement of urinary symptoms  · Patient reports drinking approximately 2 glasses of water a day and multiple glasses of iced tea a day  · Explained to patient the need to increase amount of water that she is drinking daily and decrease in the amount of iced tea and other bladder stimulants to assist in the reduction of further episodes of urinary frequency/urgency and possible incontinence  · Patient verbalized understanding and answered all questions to patient's satisfaction        History of Present Illness  Marisabel Barboza is a 66 y o  female here for follow up evaluation of PTNS sessions  Patient currently complains of continued intermittent urinary frequency and urgency  He reports that over the course of the last week she has been doing well with her urinary symptoms  Patient currently states she drinks approximately 2 glasses of water per day and multiple glass of iced tea during the day  Patient denies episodes of nocturia  Patient denies dysuria, hematuria, urinary retention  She is currently satisfied with her urinary status will continue to proceed with PTNS therapy  Review of Systems   Constitutional: Negative for chills and fever  Respiratory: Negative for cough and shortness of breath  Cardiovascular: Negative for chest pain  Gastrointestinal: Negative for abdominal distention, abdominal pain, blood in stool, nausea and vomiting  Genitourinary: Positive for frequency and urgency  Negative for difficulty urinating, dysuria, enuresis, flank pain and hematuria  Skin: Negative for rash  Urinary Incontinence Screening      Most Recent Value   Urinary Incontinence   Urinary Incontinence? Yes [1x since last week "because i waited too long to go"]   Incomplete emptying? No   Urinary frequency? No   Urinary urgency? No   Urinary hesitancy? No   Dysuria (painful difficult urination)? No   Nocturia (waking up to use the bathroom)? Yes [once a night on average]   Straining (having to push to go)? No   Weak stream?  No   Intermittent stream?  No   Post void dribbling? Yes   Vaginal pressure? No   Vaginal dryness?   No          Past Medical History  Past Medical History:   Diagnosis Date    Anemia     Depression     Diabetes mellitus ([address] )     Hyperglycemia     vx type 2 dm -- last assessed 4/1/14; resolved 11/7/17    Hyperlipidemia     Hypertension     Obesity     last assessed 10/14/17; resolved 11/7/17       Past Social History  Past Surgical History:   Procedure Laterality Date    ABDOMINAL SURGERY      GASTRIC BYPASS    CHOLECYSTECTOMY      at the time of gastric bypass    COLONOSCOPY      GASTRIC BYPASS      HYSTERECTOMY Bilateral     total abdominal with salpingo-oophorectomy    CT LAP, RADICAL HYST W/ TUBE&OV, NODE BX N/A 12/19/2017    Procedure: HYSTERECTOMY LAPAROSCOPIC TOTAL ([address] [place] [address]) W/ ROBOTICS; BILATERAL SALPINGOOPHERECTOMY; LYMPH NODE DISSECTION; lysis of adhesions;  Surgeon: Luz Calhoun MD;  [facility];  Service: Gynecology Oncology    CT LAP,DIAGNOSTIC ABDOMEN N/A 12/19/2017    Procedure: LAPAROSCOPY DIAGNOSTIC;  Surgeon: Luz Calhoun MD;  [facility];  Service: Gynecology Oncology    TONSILLECTOMY       Social History     Tobacco Use   Smoking Status Never Smoker   Smokeless Tobacco Never Used       Past Family History  Family History   Problem Relation Age of Onset    Other Mother         dyslipidemia    Ovarian cancer Mother     Hodgkin's lymphoma Father     Bone cancer Maternal Grandfather        Past Social history  Social History     Socioeconomic History    Marital status: Single     Spouse name: Not on file    Number of children: Not on file    Years of education: Not on file    Highest education level: Not on file   Occupational History    Not on file   Social Needs    Financial resource strain: Not on file    Food insecurity:     Worry: Not on file     Inability: Not on file    Transportation needs:     Medical: Not on file     Non-medical: Not on file   Tobacco Use    Smoking status: Never Smoker    Smokeless tobacco: Never Used   Substance and Sexual Activity    Alcohol use: No    Drug use: No    Sexual activity: Not on file   Lifestyle    Physical activity:     Days per week: Not on file     Minutes per session: Not on file    Stress: Not on file   Relationships    Social connections:     Talks on phone: Not on file     Gets together: Not on file     Attends Anabaptism service: Not on file     Active member of club or organization: Not on file     Attends meetings of clubs or organizations: Not on file     Relationship status: Not on file    Intimate partner violence:     Fear of current or ex partner: Not on file     Emotionally abused: Not on file     Physically abused: Not on file     Forced sexual activity: Not on file   Other Topics Concern    Not on file   Social History Narrative    Not on file       Current Medications  Current Outpatient Medications   Medication Sig Dispense Refill    ARIPiprazole (ABILIFY) 10 mg tablet Take 10 mg by mouth daily      aspirin (ECOTRIN LOW STRENGTH) 81 mg EC tablet Take 81 mg by mouth daily      Biotin (BIOTIN 5000) 5 MG CAPS Take by mouth      cholecalciferol (VITAMIN D3) 1,000 units tablet Take 1,000 Units by mouth daily      Ferrous Sulfate (IRON) 28 MG TABS Take by mouth      gemfibrozil (LOPID) 600 mg tablet Take 1 tablet (600 mg total) by mouth daily 90 tablet 1    metFORMIN (GLUCOPHAGE) 500 mg tablet Take 1 tablet (500 mg total) by mouth daily 180 tablet 1    MYRBETRIQ 50 MG TB24 TAKE 1 TABLET DAILY 30 tablet 10    oxybutynin (DITROPAN-XL) 10 MG 24 hr tablet TAKE 1 TABLET BY MOUTH EVERY DAY 30 tablet 11    quinapril (ACCUPRIL) 5 mg tablet Take 1 tablet (5 mg total) by mouth daily at bedtime 90 tablet 1    venlafaxine (EFFEXOR) 75 mg tablet Take 75 mg by mouth 3 (three) times a day        VYVANSE 60 MG capsule Take 60 mg by mouth daily  0     No current facility-administered medications for this visit  Allergies  Allergies   Allergen Reactions    Cephalosporins Other (See Comments)     RASH - BILATERAL ARMS PER RN         The following portions of the patient's history were reviewed and updated as appropriate: allergies, current medications, past medical history, past social history, past surgical history and problem list       Vitals  Vitals:    03/20/19 1500   BP: 128/84   BP Location: Left arm   Patient Position: Sitting   Cuff Size: Adult   Pulse: 76   Weight: 103 kg (226 lb)   Height: 4' 11" (1 499 m)     Physical Exam  Physical Exam   Constitutional: She is oriented to person, place, and time  She appears well-developed and well-nourished  HENT:   Head: Atraumatic  Eyes: EOM are normal    Neck: Normal range of motion  Neck supple  Cardiovascular: Normal rate, regular rhythm, normal heart sounds and intact distal pulses  No murmur heard  Pulmonary/Chest: Effort normal and breath sounds normal  No respiratory distress  Abdominal: Soft  Bowel sounds are normal    Musculoskeletal: Normal range of motion  Neurological: She is alert and oriented to person, place, and time  Skin: Skin is warm and dry  Psychiatric: She has a normal mood and affect  Results  No results found for this or any previous visit (from the past 1 hour(s))  ]  No results found for: PSA  Lab Results   Component Value Date    GLUCOSE 219 (H) 12/19/2017    CALCIUM 7 3 (L) 12/19/2017     10/27/2017    K 4 2 11/01/2018    CO2 24 11/01/2018     11/01/2018 BUN 12 11/01/2018    CREATININE 0 61 12/19/2017     Lab Results   Component Value Date    WBC 8 9 11/01/2018    HGB 12 8 11/01/2018    HCT 37 6 11/01/2018    MCV 85 11/01/2018     11/01/2018       Saad RAFITA Sutherland

## 2019-03-27 ENCOUNTER — PROCEDURE VISIT (OUTPATIENT)
Dept: UROLOGY | Facility: AMBULATORY SURGERY CENTER | Age: 64
End: 2019-03-27
Payer: COMMERCIAL

## 2019-03-27 VITALS
SYSTOLIC BLOOD PRESSURE: 140 MMHG | WEIGHT: 226 LBS | HEIGHT: 59 IN | BODY MASS INDEX: 45.56 KG/M2 | HEART RATE: 78 BPM | DIASTOLIC BLOOD PRESSURE: 80 MMHG

## 2019-03-27 DIAGNOSIS — R35.0 FREQUENCY OF URINATION: Primary | ICD-10-CM

## 2019-03-27 PROCEDURE — 64566 NEUROELTRD STIM POST TIBIAL: CPT

## 2019-03-27 RX ORDER — DEXMETHYLPHENIDATE HYDROCHLORIDE 10 MG/1
10 CAPSULE, EXTENDED RELEASE ORAL DAILY
COMMUNITY
End: 2019-09-29

## 2019-03-27 NOTE — PROGRESS NOTES
3/27/2019    William Melendez  1955  [id number]    Diagnosis  Chief Complaint     Urinary Frequency         Patient presents for PTNS 10 of 12 managed by Dr Sharleen Curling  1 wk for next PTNS    Urinary Incontinence Screening      Most Recent Value   Urinary Incontinence   Urinary Incontinence? Yes [not per day, 1 per week]   Incomplete emptying? No   Urinary frequency? No   Urinary urgency? Yes   Urinary hesitancy? No   Dysuria (painful difficult urination)? No   Nocturia (waking up to use the bathroom)? No   Straining (having to push to go)? No   Weak stream?  No   Intermittent stream?  No   Post void dribbling?   Yes            Procedure PTNS  Session 10 of 12    Ankle used: left  Treatment settin  Duration of treatment: 30 minutes  Lead lot #:[id number]  Expiration Date:21  Feeling/Response:bottom of foot        Jj Schaefer

## 2019-04-03 ENCOUNTER — PROCEDURE VISIT (OUTPATIENT)
Dept: UROLOGY | Facility: AMBULATORY SURGERY CENTER | Age: 64
End: 2019-04-03
Payer: COMMERCIAL

## 2019-04-03 VITALS
HEART RATE: 80 BPM | DIASTOLIC BLOOD PRESSURE: 80 MMHG | WEIGHT: 226 LBS | HEIGHT: 59 IN | BODY MASS INDEX: 45.56 KG/M2 | SYSTOLIC BLOOD PRESSURE: 130 MMHG

## 2019-04-03 DIAGNOSIS — N39.41 URGE INCONTINENCE: ICD-10-CM

## 2019-04-03 DIAGNOSIS — R39.15 URINARY URGENCY: ICD-10-CM

## 2019-04-03 DIAGNOSIS — R35.0 FREQUENCY OF URINATION: ICD-10-CM

## 2019-04-03 PROCEDURE — 64566 NEUROELTRD STIM POST TIBIAL: CPT

## 2019-04-04 ENCOUNTER — OFFICE VISIT (OUTPATIENT)
Dept: FAMILY MEDICINE CLINIC | Facility: CLINIC | Age: 64
End: 2019-04-04
Payer: COMMERCIAL

## 2019-04-04 VITALS
WEIGHT: 224 LBS | RESPIRATION RATE: 16 BRPM | BODY MASS INDEX: 45.16 KG/M2 | OXYGEN SATURATION: 96 % | SYSTOLIC BLOOD PRESSURE: 96 MMHG | HEART RATE: 74 BPM | TEMPERATURE: 97.6 F | HEIGHT: 59 IN | DIASTOLIC BLOOD PRESSURE: 66 MMHG

## 2019-04-04 DIAGNOSIS — L73.9 FOLLICULITIS: ICD-10-CM

## 2019-04-04 DIAGNOSIS — Z00.00 ANNUAL PHYSICAL EXAM: Primary | ICD-10-CM

## 2019-04-04 PROCEDURE — 99396 PREV VISIT EST AGE 40-64: CPT | Performed by: INTERNAL MEDICINE

## 2019-04-04 RX ORDER — DOXYCYCLINE HYCLATE 100 MG/1
100 CAPSULE ORAL EVERY 12 HOURS SCHEDULED
Qty: 20 CAPSULE | Refills: 0 | Status: SHIPPED | OUTPATIENT
Start: 2019-04-04 | End: 2019-04-14

## 2019-04-10 ENCOUNTER — PROCEDURE VISIT (OUTPATIENT)
Dept: UROLOGY | Facility: AMBULATORY SURGERY CENTER | Age: 64
End: 2019-04-10
Payer: COMMERCIAL

## 2019-04-10 VITALS
HEART RATE: 76 BPM | DIASTOLIC BLOOD PRESSURE: 68 MMHG | SYSTOLIC BLOOD PRESSURE: 100 MMHG | HEIGHT: 59 IN | WEIGHT: 224 LBS | BODY MASS INDEX: 45.16 KG/M2

## 2019-04-10 DIAGNOSIS — R39.15 URINARY URGENCY: ICD-10-CM

## 2019-04-10 DIAGNOSIS — R35.0 FREQUENCY OF URINATION: ICD-10-CM

## 2019-04-10 DIAGNOSIS — N39.41 URGE INCONTINENCE: ICD-10-CM

## 2019-04-10 PROCEDURE — 64566 NEUROELTRD STIM POST TIBIAL: CPT

## 2019-05-15 ENCOUNTER — OFFICE VISIT (OUTPATIENT)
Dept: UROLOGY | Facility: AMBULATORY SURGERY CENTER | Age: 64
End: 2019-05-15
Payer: COMMERCIAL

## 2019-05-15 VITALS
HEIGHT: 59 IN | WEIGHT: 232 LBS | DIASTOLIC BLOOD PRESSURE: 80 MMHG | SYSTOLIC BLOOD PRESSURE: 124 MMHG | HEART RATE: 73 BPM | BODY MASS INDEX: 46.77 KG/M2

## 2019-05-15 DIAGNOSIS — B37.3 VAGINAL YEAST INFECTION: Primary | ICD-10-CM

## 2019-05-15 PROCEDURE — 87086 URINE CULTURE/COLONY COUNT: CPT | Performed by: NURSE PRACTITIONER

## 2019-05-15 PROCEDURE — 99213 OFFICE O/P EST LOW 20 MIN: CPT | Performed by: NURSE PRACTITIONER

## 2019-05-15 PROCEDURE — 64566 NEUROELTRD STIM POST TIBIAL: CPT | Performed by: NURSE PRACTITIONER

## 2019-05-15 RX ORDER — FLUCONAZOLE 150 MG/1
150 TABLET ORAL ONCE
Qty: 1 TABLET | Refills: 0 | Status: SHIPPED | OUTPATIENT
Start: 2019-05-15 | End: 2019-05-15

## 2019-05-16 LAB — BACTERIA UR CULT: NORMAL

## 2019-05-20 ENCOUNTER — OFFICE VISIT (OUTPATIENT)
Dept: FAMILY MEDICINE CLINIC | Facility: CLINIC | Age: 64
End: 2019-05-20
Payer: COMMERCIAL

## 2019-05-20 VITALS
WEIGHT: 232 LBS | HEART RATE: 81 BPM | HEIGHT: 59 IN | OXYGEN SATURATION: 96 % | DIASTOLIC BLOOD PRESSURE: 68 MMHG | RESPIRATION RATE: 16 BRPM | SYSTOLIC BLOOD PRESSURE: 102 MMHG | BODY MASS INDEX: 46.77 KG/M2 | TEMPERATURE: 99.4 F

## 2019-05-20 DIAGNOSIS — Z13.820 SCREENING FOR OSTEOPOROSIS: ICD-10-CM

## 2019-05-20 DIAGNOSIS — Z12.39 SCREENING FOR BREAST CANCER: ICD-10-CM

## 2019-05-20 DIAGNOSIS — M54.50 RIGHT-SIDED LOW BACK PAIN WITHOUT SCIATICA, UNSPECIFIED CHRONICITY: Primary | ICD-10-CM

## 2019-05-20 PROCEDURE — 99214 OFFICE O/P EST MOD 30 MIN: CPT | Performed by: INTERNAL MEDICINE

## 2019-05-20 RX ORDER — CYCLOBENZAPRINE HCL 10 MG
10 TABLET ORAL
COMMUNITY
End: 2019-06-03

## 2019-05-20 RX ORDER — MELOXICAM 15 MG/1
15 TABLET ORAL DAILY
COMMUNITY
End: 2019-06-03

## 2019-05-23 ENCOUNTER — APPOINTMENT (OUTPATIENT)
Dept: RADIOLOGY | Facility: MEDICAL CENTER | Age: 64
End: 2019-05-23
Payer: COMMERCIAL

## 2019-05-23 DIAGNOSIS — M54.50 RIGHT-SIDED LOW BACK PAIN WITHOUT SCIATICA, UNSPECIFIED CHRONICITY: ICD-10-CM

## 2019-05-23 PROCEDURE — 72110 X-RAY EXAM L-2 SPINE 4/>VWS: CPT

## 2019-05-29 ENCOUNTER — HOSPITAL ENCOUNTER (OUTPATIENT)
Dept: ULTRASOUND IMAGING | Facility: HOSPITAL | Age: 64
Discharge: HOME | End: 2019-05-29
Payer: COMMERCIAL

## 2019-05-29 ENCOUNTER — OFFICE VISIT (OUTPATIENT)
Dept: FAMILY MEDICINE CLINIC | Facility: CLINIC | Age: 64
End: 2019-05-29
Payer: COMMERCIAL

## 2019-05-29 VITALS
BODY MASS INDEX: 47.58 KG/M2 | OXYGEN SATURATION: 97 % | RESPIRATION RATE: 16 BRPM | DIASTOLIC BLOOD PRESSURE: 74 MMHG | HEIGHT: 59 IN | SYSTOLIC BLOOD PRESSURE: 102 MMHG | WEIGHT: 236 LBS | TEMPERATURE: 98.7 F | HEART RATE: 85 BPM

## 2019-05-29 DIAGNOSIS — M79.89 SWOLLEN LEG: ICD-10-CM

## 2019-05-29 DIAGNOSIS — L03.90 CELLULITIS, UNSPECIFIED CELLULITIS SITE: Primary | ICD-10-CM

## 2019-05-29 PROCEDURE — 99214 OFFICE O/P EST MOD 30 MIN: CPT | Performed by: INTERNAL MEDICINE

## 2019-05-29 PROCEDURE — 93971 EXTREMITY STUDY: CPT

## 2019-05-29 RX ORDER — SULFAMETHOXAZOLE AND TRIMETHOPRIM 800; 160 MG/1; MG/1
1 TABLET ORAL EVERY 12 HOURS SCHEDULED
Qty: 20 TABLET | Refills: 0 | Status: SHIPPED | OUTPATIENT
Start: 2019-05-29 | End: 2019-06-05

## 2019-05-30 ENCOUNTER — HOSPITAL ENCOUNTER (OUTPATIENT)
Dept: NON INVASIVE DIAGNOSTICS | Facility: CLINIC | Age: 64
Discharge: HOME | End: 2019-05-30
Payer: COMMERCIAL

## 2019-05-30 ENCOUNTER — TELEPHONE (OUTPATIENT)
Dept: FAMILY MEDICINE CLINIC | Facility: CLINIC | Age: 64
End: 2019-05-30

## 2019-05-30 DIAGNOSIS — I82.90 DEEP VEIN THROMBOSIS (DVT) OF NON-EXTREMITY VEIN, UNSPECIFIED CHRONICITY: Primary | ICD-10-CM

## 2019-05-30 DIAGNOSIS — I82.4Y1 DEEP VEIN THROMBOSIS (DVT) OF PROXIMAL VEIN OF RIGHT LOWER EXTREMITY, UNSPECIFIED CHRONICITY (HCC): Primary | ICD-10-CM

## 2019-05-30 DIAGNOSIS — I82.90 DEEP VEIN THROMBOSIS (DVT) OF NON-EXTREMITY VEIN, UNSPECIFIED CHRONICITY: ICD-10-CM

## 2019-05-30 DIAGNOSIS — I82.4Y1 DEEP VEIN THROMBOSIS (DVT) OF PROXIMAL VEIN OF RIGHT LOWER EXTREMITY, UNSPECIFIED CHRONICITY (HCC): ICD-10-CM

## 2019-05-30 PROCEDURE — 93971 EXTREMITY STUDY: CPT | Performed by: SURGERY

## 2019-05-30 PROCEDURE — 93978 VASCULAR STUDY: CPT

## 2019-05-30 PROCEDURE — 93978 VASCULAR STUDY: CPT | Performed by: SURGERY

## 2019-06-03 ENCOUNTER — OFFICE VISIT (OUTPATIENT)
Dept: FAMILY MEDICINE CLINIC | Facility: CLINIC | Age: 64
End: 2019-06-03
Payer: COMMERCIAL

## 2019-06-03 VITALS
BODY MASS INDEX: 46.37 KG/M2 | RESPIRATION RATE: 16 BRPM | SYSTOLIC BLOOD PRESSURE: 118 MMHG | HEART RATE: 83 BPM | HEIGHT: 59 IN | DIASTOLIC BLOOD PRESSURE: 70 MMHG | OXYGEN SATURATION: 96 % | WEIGHT: 230 LBS | TEMPERATURE: 99.4 F

## 2019-06-03 DIAGNOSIS — M79.89 SWOLLEN LEG: ICD-10-CM

## 2019-06-03 DIAGNOSIS — I82.890 ACUTE DEEP VEIN THROMBOSIS (DVT) OF RIGHT SIDE OF PELVIC REGION: Primary | ICD-10-CM

## 2019-06-03 PROCEDURE — 99213 OFFICE O/P EST LOW 20 MIN: CPT | Performed by: INTERNAL MEDICINE

## 2019-06-03 PROCEDURE — 3008F BODY MASS INDEX DOCD: CPT | Performed by: INTERNAL MEDICINE

## 2019-06-03 RX ORDER — FUROSEMIDE 40 MG/1
40 TABLET ORAL 2 TIMES DAILY
Qty: 30 TABLET | Refills: 0 | Status: SHIPPED | OUTPATIENT
Start: 2019-06-03 | End: 2019-07-22 | Stop reason: SDUPTHER

## 2019-06-03 RX ORDER — POTASSIUM CHLORIDE 20 MEQ/1
20 TABLET, EXTENDED RELEASE ORAL 2 TIMES DAILY
Qty: 30 TABLET | Refills: 0 | Status: SHIPPED | OUTPATIENT
Start: 2019-06-03 | End: 2019-07-22 | Stop reason: SDUPTHER

## 2019-06-10 ENCOUNTER — TELEPHONE (OUTPATIENT)
Dept: UROLOGY | Facility: MEDICAL CENTER | Age: 64
End: 2019-06-10

## 2019-06-11 ENCOUNTER — TELEPHONE (OUTPATIENT)
Dept: FAMILY MEDICINE CLINIC | Facility: CLINIC | Age: 64
End: 2019-06-11

## 2019-06-11 ENCOUNTER — HOSPITAL ENCOUNTER (EMERGENCY)
Facility: HOSPITAL | Age: 64
Discharge: HOME | End: 2019-06-11
Attending: EMERGENCY MEDICINE | Admitting: EMERGENCY MEDICINE
Payer: COMMERCIAL

## 2019-06-11 VITALS
RESPIRATION RATE: 16 BRPM | OXYGEN SATURATION: 99 % | HEART RATE: 78 BPM | TEMPERATURE: 97.6 F | SYSTOLIC BLOOD PRESSURE: 150 MMHG | DIASTOLIC BLOOD PRESSURE: 69 MMHG

## 2019-06-11 DIAGNOSIS — I82.421 ACUTE DEEP VEIN THROMBOSIS (DVT) OF ILIAC VEIN OF RIGHT LOWER EXTREMITY (HCC): Primary | ICD-10-CM

## 2019-06-11 PROCEDURE — 99282 EMERGENCY DEPT VISIT SF MDM: CPT | Performed by: EMERGENCY MEDICINE

## 2019-06-11 PROCEDURE — 99283 EMERGENCY DEPT VISIT LOW MDM: CPT

## 2019-06-12 ENCOUNTER — TRANSCRIBE ORDERS (OUTPATIENT)
Dept: ADMINISTRATIVE | Facility: HOSPITAL | Age: 64
End: 2019-06-12

## 2019-06-12 ENCOUNTER — PROCEDURE VISIT (OUTPATIENT)
Dept: UROLOGY | Facility: AMBULATORY SURGERY CENTER | Age: 64
End: 2019-06-12
Payer: COMMERCIAL

## 2019-06-12 ENCOUNTER — HOSPITAL ENCOUNTER (OUTPATIENT)
Dept: RADIOLOGY | Facility: HOSPITAL | Age: 64
Discharge: HOME | End: 2019-06-12
Payer: COMMERCIAL

## 2019-06-12 VITALS
SYSTOLIC BLOOD PRESSURE: 120 MMHG | HEART RATE: 80 BPM | BODY MASS INDEX: 47.37 KG/M2 | HEIGHT: 59 IN | DIASTOLIC BLOOD PRESSURE: 80 MMHG | WEIGHT: 235 LBS

## 2019-06-12 DIAGNOSIS — N39.41 URGE INCONTINENCE OF URINE: Primary | ICD-10-CM

## 2019-06-12 DIAGNOSIS — R39.15 URINARY URGENCY: ICD-10-CM

## 2019-06-12 DIAGNOSIS — R35.0 URINARY FREQUENCY: ICD-10-CM

## 2019-06-12 DIAGNOSIS — I82.421 ACUTE DEEP VEIN THROMBOSIS (DVT) OF ILIAC VEIN OF RIGHT LOWER EXTREMITY (HCC): ICD-10-CM

## 2019-06-12 DIAGNOSIS — I82.401 DEEP VEIN THROMBOSIS (DVT) OF RIGHT LOWER EXTREMITY, UNSPECIFIED CHRONICITY, UNSPECIFIED VEIN (HCC): Primary | ICD-10-CM

## 2019-06-12 PROCEDURE — 64566 NEUROELTRD STIM POST TIBIAL: CPT

## 2019-06-12 PROCEDURE — 93978 VASCULAR STUDY: CPT

## 2019-06-12 PROCEDURE — 93978 VASCULAR STUDY: CPT | Performed by: SURGERY

## 2019-06-13 ENCOUNTER — TELEPHONE (OUTPATIENT)
Dept: FAMILY MEDICINE CLINIC | Facility: CLINIC | Age: 64
End: 2019-06-13

## 2019-06-14 ENCOUNTER — TELEPHONE (OUTPATIENT)
Dept: FAMILY MEDICINE CLINIC | Facility: CLINIC | Age: 64
End: 2019-06-14

## 2019-06-17 DIAGNOSIS — M79.606 PAIN OF LOWER EXTREMITY, UNSPECIFIED LATERALITY: Primary | ICD-10-CM

## 2019-06-17 RX ORDER — TRAMADOL HYDROCHLORIDE 50 MG/1
50 TABLET ORAL
Qty: 30 TABLET | Refills: 0 | Status: SHIPPED | OUTPATIENT
Start: 2019-06-17 | End: 2019-07-22

## 2019-06-19 ENCOUNTER — OFFICE VISIT (OUTPATIENT)
Dept: GYNECOLOGIC ONCOLOGY | Facility: CLINIC | Age: 64
End: 2019-06-19
Payer: COMMERCIAL

## 2019-06-19 VITALS
SYSTOLIC BLOOD PRESSURE: 126 MMHG | WEIGHT: 233 LBS | RESPIRATION RATE: 16 BRPM | HEIGHT: 59 IN | DIASTOLIC BLOOD PRESSURE: 76 MMHG | HEART RATE: 78 BPM | BODY MASS INDEX: 46.97 KG/M2

## 2019-06-19 DIAGNOSIS — Z85.42 HISTORY OF ENDOMETRIAL CANCER: Primary | ICD-10-CM

## 2019-06-19 DIAGNOSIS — Z12.39 BREAST CANCER SCREENING: ICD-10-CM

## 2019-06-19 DIAGNOSIS — I82.4Y1 ACUTE DEEP VEIN THROMBOSIS (DVT) OF PROXIMAL VEIN OF RIGHT LOWER EXTREMITY (HCC): ICD-10-CM

## 2019-06-19 DIAGNOSIS — Z12.11 COLON CANCER SCREENING: ICD-10-CM

## 2019-06-19 PROBLEM — Z08 ENCOUNTER FOR FOLLOW-UP EXAMINATION AFTER COMPLETED TREATMENT FOR MALIGNANT NEOPLASM: Status: RESOLVED | Noted: 2019-03-17 | Resolved: 2019-06-19

## 2019-06-19 PROBLEM — I82.4Y9 ACUTE DEEP VEIN THROMBOSIS (DVT) OF PROXIMAL VEIN OF LOWER EXTREMITY (HCC): Status: ACTIVE | Noted: 2019-06-19

## 2019-06-19 PROCEDURE — 99215 OFFICE O/P EST HI 40 MIN: CPT | Performed by: OBSTETRICS & GYNECOLOGY

## 2019-06-20 ENCOUNTER — HOSPITAL ENCOUNTER (OUTPATIENT)
Dept: RADIOLOGY | Age: 64
Discharge: HOME | End: 2019-06-20
Payer: COMMERCIAL

## 2019-06-20 VITALS — HEIGHT: 59 IN | BODY MASS INDEX: 46.97 KG/M2 | WEIGHT: 233 LBS

## 2019-06-20 DIAGNOSIS — I82.4Y1 ACUTE DEEP VEIN THROMBOSIS (DVT) OF PROXIMAL VEIN OF RIGHT LOWER EXTREMITY (HCC): ICD-10-CM

## 2019-06-20 DIAGNOSIS — Z85.42 HISTORY OF ENDOMETRIAL CANCER: ICD-10-CM

## 2019-06-20 DIAGNOSIS — Z12.39 BREAST CANCER SCREENING: ICD-10-CM

## 2019-06-20 PROCEDURE — 77067 SCR MAMMO BI INCL CAD: CPT

## 2019-06-21 DIAGNOSIS — I10 BENIGN ESSENTIAL HYPERTENSION: ICD-10-CM

## 2019-06-24 ENCOUNTER — HOSPITAL ENCOUNTER (OUTPATIENT)
Dept: MAMMOGRAPHY | Facility: CLINIC | Age: 64
Discharge: HOME | End: 2019-06-24
Payer: COMMERCIAL

## 2019-06-24 ENCOUNTER — HOSPITAL ENCOUNTER (OUTPATIENT)
Dept: ULTRASOUND IMAGING | Facility: CLINIC | Age: 64
Discharge: HOME | End: 2019-06-24
Payer: COMMERCIAL

## 2019-06-24 ENCOUNTER — CONSULT (OUTPATIENT)
Dept: VASCULAR SURGERY | Facility: CLINIC | Age: 64
End: 2019-06-24
Payer: COMMERCIAL

## 2019-06-24 VITALS
WEIGHT: 234 LBS | HEIGHT: 59 IN | DIASTOLIC BLOOD PRESSURE: 90 MMHG | RESPIRATION RATE: 16 BRPM | TEMPERATURE: 99.3 F | BODY MASS INDEX: 47.17 KG/M2 | HEART RATE: 86 BPM | SYSTOLIC BLOOD PRESSURE: 150 MMHG

## 2019-06-24 DIAGNOSIS — I82.4Y1 ACUTE DEEP VEIN THROMBOSIS (DVT) OF PROXIMAL VEIN OF RIGHT LOWER EXTREMITY (HCC): Primary | ICD-10-CM

## 2019-06-24 DIAGNOSIS — R92.8 ABNORMAL MAMMOGRAM: ICD-10-CM

## 2019-06-24 DIAGNOSIS — I82.401 DEEP VEIN THROMBOSIS (DVT) OF RIGHT LOWER EXTREMITY, UNSPECIFIED CHRONICITY, UNSPECIFIED VEIN (HCC): ICD-10-CM

## 2019-06-24 DIAGNOSIS — M79.606 PAIN OF LOWER EXTREMITY, UNSPECIFIED LATERALITY: ICD-10-CM

## 2019-06-24 PROCEDURE — G0279 TOMOSYNTHESIS, MAMMO: HCPCS

## 2019-06-24 PROCEDURE — 76642 ULTRASOUND BREAST LIMITED: CPT

## 2019-06-24 PROCEDURE — 77065 DX MAMMO INCL CAD UNI: CPT

## 2019-06-24 PROCEDURE — 99244 OFF/OP CNSLTJ NEW/EST MOD 40: CPT | Performed by: NURSE PRACTITIONER

## 2019-06-24 RX ORDER — QUINAPRIL 5 1/1
TABLET ORAL
Qty: 90 TABLET | Refills: 1 | Status: SHIPPED | OUTPATIENT
Start: 2019-06-24 | End: 2019-09-29 | Stop reason: HOSPADM

## 2019-06-24 RX ORDER — TRAMADOL HYDROCHLORIDE 50 MG/1
50 TABLET ORAL EVERY 6 HOURS PRN
Qty: 20 TABLET | Refills: 0 | Status: CANCELLED | OUTPATIENT
Start: 2019-06-24 | End: 2019-06-29

## 2019-06-28 ENCOUNTER — HOSPITAL ENCOUNTER (OUTPATIENT)
Dept: ULTRASOUND IMAGING | Facility: CLINIC | Age: 64
Discharge: HOME | End: 2019-06-28
Payer: COMMERCIAL

## 2019-06-28 ENCOUNTER — HOSPITAL ENCOUNTER (OUTPATIENT)
Dept: MAMMOGRAPHY | Facility: CLINIC | Age: 64
Discharge: HOME | End: 2019-06-28
Payer: COMMERCIAL

## 2019-06-28 VITALS
HEART RATE: 72 BPM | WEIGHT: 230 LBS | DIASTOLIC BLOOD PRESSURE: 80 MMHG | BODY MASS INDEX: 46.37 KG/M2 | SYSTOLIC BLOOD PRESSURE: 130 MMHG | HEIGHT: 59 IN

## 2019-06-28 DIAGNOSIS — R92.8 ABNORMAL ULTRASOUND OF BREAST: ICD-10-CM

## 2019-06-28 PROCEDURE — 88305 TISSUE EXAM BY PATHOLOGIST: CPT | Performed by: PATHOLOGY

## 2019-06-28 PROCEDURE — 88342 IMHCHEM/IMCYTCHM 1ST ANTB: CPT | Performed by: PATHOLOGY

## 2019-06-28 PROCEDURE — 88341 IMHCHEM/IMCYTCHM EA ADD ANTB: CPT | Performed by: PATHOLOGY

## 2019-06-28 PROCEDURE — 19083 BX BREAST 1ST LESION US IMAG: CPT

## 2019-06-28 RX ORDER — LIDOCAINE HYDROCHLORIDE 10 MG/ML
4 INJECTION, SOLUTION INFILTRATION; PERINEURAL ONCE
Status: COMPLETED | OUTPATIENT
Start: 2019-06-28 | End: 2019-06-28

## 2019-06-28 RX ADMIN — LIDOCAINE HYDROCHLORIDE 4 ML: 10 INJECTION, SOLUTION INFILTRATION; PERINEURAL at 11:28

## 2019-07-01 ENCOUNTER — TELEPHONE (OUTPATIENT)
Dept: FAMILY MEDICINE CLINIC | Facility: CLINIC | Age: 64
End: 2019-07-01

## 2019-07-01 NOTE — TELEPHONE ENCOUNTER
Patient is requesting a referral for pain management as she is still having a lot of pain in her leg due to the blood clot

## 2019-07-02 DIAGNOSIS — L03.90 CELLULITIS, UNSPECIFIED CELLULITIS SITE: Primary | ICD-10-CM

## 2019-07-02 RX ORDER — SULFAMETHOXAZOLE AND TRIMETHOPRIM 800; 160 MG/1; MG/1
1 TABLET ORAL EVERY 12 HOURS SCHEDULED
Qty: 14 TABLET | Refills: 0 | Status: SHIPPED | OUTPATIENT
Start: 2019-07-02 | End: 2019-07-09

## 2019-07-02 NOTE — PROGRESS NOTES
Pt called back  Will tx with bactrim  Told her the leg should not hurt with dvt unless infected  If this does not help to vascular

## 2019-07-03 ENCOUNTER — TELEPHONE (OUTPATIENT)
Dept: MAMMOGRAPHY | Facility: CLINIC | Age: 64
End: 2019-07-03

## 2019-07-05 ENCOUNTER — TELEPHONE (OUTPATIENT)
Dept: FAMILY MEDICINE CLINIC | Facility: CLINIC | Age: 64
End: 2019-07-05

## 2019-07-05 DIAGNOSIS — Z85.42 HISTORY OF ENDOMETRIAL CANCER: ICD-10-CM

## 2019-07-05 DIAGNOSIS — I82.4Y1 ACUTE DEEP VEIN THROMBOSIS (DVT) OF PROXIMAL VEIN OF RIGHT LOWER EXTREMITY (HCC): Primary | ICD-10-CM

## 2019-07-05 NOTE — TELEPHONE ENCOUNTER
Patient states the antibiotic is helping a little but she is still in a lot of pain with her leg  She doesn't know if there is stronger antibiotic you could prescribe or something for the pain  She did see a vascular doctor on 6/24 but she said they really didn't do anything different than what you are doing  Please let her know what she should do

## 2019-07-07 ENCOUNTER — APPOINTMENT (EMERGENCY)
Dept: CT IMAGING | Facility: HOSPITAL | Age: 64
End: 2019-07-07
Payer: COMMERCIAL

## 2019-07-07 ENCOUNTER — HOSPITAL ENCOUNTER (EMERGENCY)
Facility: HOSPITAL | Age: 64
End: 2019-07-07
Attending: EMERGENCY MEDICINE | Admitting: EMERGENCY MEDICINE
Payer: COMMERCIAL

## 2019-07-07 ENCOUNTER — HOSPITAL ENCOUNTER (INPATIENT)
Facility: HOSPITAL | Age: 64
LOS: 2 days | Discharge: HOME | DRG: 357 | End: 2019-07-09
Attending: OBSTETRICS & GYNECOLOGY | Admitting: OBSTETRICS & GYNECOLOGY
Payer: COMMERCIAL

## 2019-07-07 VITALS
RESPIRATION RATE: 18 BRPM | HEART RATE: 78 BPM | WEIGHT: 224.87 LBS | SYSTOLIC BLOOD PRESSURE: 144 MMHG | DIASTOLIC BLOOD PRESSURE: 68 MMHG | OXYGEN SATURATION: 99 % | BODY MASS INDEX: 45.42 KG/M2 | TEMPERATURE: 97.8 F

## 2019-07-07 DIAGNOSIS — Z85.42 HISTORY OF ENDOMETRIAL CANCER: ICD-10-CM

## 2019-07-07 DIAGNOSIS — G89.3 CANCER ASSOCIATED PAIN: ICD-10-CM

## 2019-07-07 DIAGNOSIS — R19.00 PELVIC MASS: Primary | ICD-10-CM

## 2019-07-07 DIAGNOSIS — Z90.710 S/P HYSTERECTOMY: ICD-10-CM

## 2019-07-07 DIAGNOSIS — K59.00 CONSTIPATION: ICD-10-CM

## 2019-07-07 DIAGNOSIS — G47.01 INSOMNIA DUE TO MEDICAL CONDITION: ICD-10-CM

## 2019-07-07 DIAGNOSIS — I82.421 ACUTE DEEP VEIN THROMBOSIS (DVT) OF ILIAC VEIN OF RIGHT LOWER EXTREMITY (HCC): ICD-10-CM

## 2019-07-07 DIAGNOSIS — I82.4Y1 ACUTE DEEP VEIN THROMBOSIS (DVT) OF PROXIMAL VEIN OF RIGHT LOWER EXTREMITY (HCC): Primary | ICD-10-CM

## 2019-07-07 LAB
ALBUMIN SERPL BCP-MCNC: 3.6 G/DL (ref 3.5–5)
ALP SERPL-CCNC: 93 U/L (ref 46–116)
ALT SERPL W P-5'-P-CCNC: 19 U/L (ref 12–78)
ANION GAP SERPL CALCULATED.3IONS-SCNC: 13 MMOL/L (ref 4–13)
APTT PPP: 53 SECONDS (ref 23–37)
AST SERPL W P-5'-P-CCNC: 13 U/L (ref 5–45)
BASOPHILS # BLD AUTO: 0.07 THOUSANDS/ΜL (ref 0–0.1)
BASOPHILS NFR BLD AUTO: 1 % (ref 0–1)
BILIRUB SERPL-MCNC: 0.2 MG/DL (ref 0.2–1)
BUN SERPL-MCNC: 11 MG/DL (ref 5–25)
CALCIUM SERPL-MCNC: 9.5 MG/DL (ref 8.3–10.1)
CHLORIDE SERPL-SCNC: 103 MMOL/L (ref 100–108)
CO2 SERPL-SCNC: 22 MMOL/L (ref 21–32)
CREAT SERPL-MCNC: 0.71 MG/DL (ref 0.6–1.3)
CRP SERPL QL: 34.1 MG/L
EOSINOPHIL # BLD AUTO: 0.05 THOUSAND/ΜL (ref 0–0.61)
EOSINOPHIL NFR BLD AUTO: 0 % (ref 0–6)
ERYTHROCYTE [DISTWIDTH] IN BLOOD BY AUTOMATED COUNT: 14.2 % (ref 11.6–15.1)
GFR SERPL CREATININE-BSD FRML MDRD: 92 ML/MIN/1.73SQ M
GLUCOSE SERPL-MCNC: 115 MG/DL (ref 65–140)
HCT VFR BLD AUTO: 32.5 % (ref 34.8–46.1)
HGB BLD-MCNC: 10.5 G/DL (ref 11.5–15.4)
IMM GRANULOCYTES # BLD AUTO: 0.11 THOUSAND/UL (ref 0–0.2)
IMM GRANULOCYTES NFR BLD AUTO: 1 % (ref 0–2)
INR PPP: 1.89 (ref 0.84–1.19)
LYMPHOCYTES # BLD AUTO: 1.9 THOUSANDS/ΜL (ref 0.6–4.47)
LYMPHOCYTES NFR BLD AUTO: 16 % (ref 14–44)
MAGNESIUM SERPL-MCNC: 1.8 MG/DL (ref 1.6–2.6)
MCH RBC QN AUTO: 28.7 PG (ref 26.8–34.3)
MCHC RBC AUTO-ENTMCNC: 32.3 G/DL (ref 31.4–37.4)
MCV RBC AUTO: 89 FL (ref 82–98)
MONOCYTES # BLD AUTO: 0.94 THOUSAND/ΜL (ref 0.17–1.22)
MONOCYTES NFR BLD AUTO: 8 % (ref 4–12)
NEUTROPHILS # BLD AUTO: 8.54 THOUSANDS/ΜL (ref 1.85–7.62)
NEUTS SEG NFR BLD AUTO: 74 % (ref 43–75)
NRBC BLD AUTO-RTO: 0 /100 WBCS
PLATELET # BLD AUTO: 458 THOUSANDS/UL (ref 149–390)
PMV BLD AUTO: 8.7 FL (ref 8.9–12.7)
POTASSIUM SERPL-SCNC: 4 MMOL/L (ref 3.5–5.3)
PROT SERPL-MCNC: 7.9 G/DL (ref 6.4–8.2)
PROTHROMBIN TIME: 20.9 SECONDS (ref 11.6–14.5)
RBC # BLD AUTO: 3.66 MILLION/UL (ref 3.81–5.12)
SODIUM SERPL-SCNC: 138 MMOL/L (ref 136–145)
WBC # BLD AUTO: 11.61 THOUSAND/UL (ref 4.31–10.16)

## 2019-07-07 PROCEDURE — 85730 THROMBOPLASTIN TIME PARTIAL: CPT | Performed by: EMERGENCY MEDICINE

## 2019-07-07 PROCEDURE — 96375 TX/PRO/DX INJ NEW DRUG ADDON: CPT

## 2019-07-07 PROCEDURE — 99284 EMERGENCY DEPT VISIT MOD MDM: CPT

## 2019-07-07 PROCEDURE — 85025 COMPLETE CBC W/AUTO DIFF WBC: CPT | Performed by: EMERGENCY MEDICINE

## 2019-07-07 PROCEDURE — 36415 COLL VENOUS BLD VENIPUNCTURE: CPT | Performed by: EMERGENCY MEDICINE

## 2019-07-07 PROCEDURE — 99284 EMERGENCY DEPT VISIT MOD MDM: CPT | Performed by: EMERGENCY MEDICINE

## 2019-07-07 PROCEDURE — 80053 COMPREHEN METABOLIC PANEL: CPT | Performed by: EMERGENCY MEDICINE

## 2019-07-07 PROCEDURE — 96361 HYDRATE IV INFUSION ADD-ON: CPT

## 2019-07-07 PROCEDURE — NC001 PR NO CHARGE: Performed by: OBSTETRICS & GYNECOLOGY

## 2019-07-07 PROCEDURE — 71260 CT THORAX DX C+: CPT

## 2019-07-07 PROCEDURE — 85610 PROTHROMBIN TIME: CPT | Performed by: EMERGENCY MEDICINE

## 2019-07-07 PROCEDURE — 86140 C-REACTIVE PROTEIN: CPT | Performed by: EMERGENCY MEDICINE

## 2019-07-07 PROCEDURE — 96374 THER/PROPH/DIAG INJ IV PUSH: CPT

## 2019-07-07 PROCEDURE — 83735 ASSAY OF MAGNESIUM: CPT | Performed by: EMERGENCY MEDICINE

## 2019-07-07 PROCEDURE — 74177 CT ABD & PELVIS W/CONTRAST: CPT

## 2019-07-07 RX ORDER — MELATONIN
1000 DAILY
Status: DISCONTINUED | OUTPATIENT
Start: 2019-07-08 | End: 2019-07-09 | Stop reason: HOSPADM

## 2019-07-07 RX ORDER — LISINOPRIL 5 MG/1
5 TABLET ORAL DAILY
Status: DISCONTINUED | OUTPATIENT
Start: 2019-07-08 | End: 2019-07-09 | Stop reason: HOSPADM

## 2019-07-07 RX ORDER — PANTOPRAZOLE SODIUM 40 MG/1
40 TABLET, DELAYED RELEASE ORAL
Status: DISCONTINUED | OUTPATIENT
Start: 2019-07-08 | End: 2019-07-09 | Stop reason: HOSPADM

## 2019-07-07 RX ORDER — OXYBUTYNIN CHLORIDE 5 MG/1
10 TABLET, EXTENDED RELEASE ORAL DAILY
Status: DISCONTINUED | OUTPATIENT
Start: 2019-07-08 | End: 2019-07-09 | Stop reason: HOSPADM

## 2019-07-07 RX ORDER — GEMFIBROZIL 600 MG/1
600 TABLET, FILM COATED ORAL DAILY
Status: DISCONTINUED | OUTPATIENT
Start: 2019-07-08 | End: 2019-07-09 | Stop reason: HOSPADM

## 2019-07-07 RX ORDER — ARIPIPRAZOLE 10 MG/1
10 TABLET ORAL DAILY
Status: DISCONTINUED | OUTPATIENT
Start: 2019-07-08 | End: 2019-07-09 | Stop reason: HOSPADM

## 2019-07-07 RX ORDER — ONDANSETRON 2 MG/ML
4 INJECTION INTRAMUSCULAR; INTRAVENOUS ONCE
Status: COMPLETED | OUTPATIENT
Start: 2019-07-07 | End: 2019-07-07

## 2019-07-07 RX ORDER — VENLAFAXINE 37.5 MG/1
75 TABLET ORAL 3 TIMES DAILY
Status: DISCONTINUED | OUTPATIENT
Start: 2019-07-07 | End: 2019-07-09 | Stop reason: HOSPADM

## 2019-07-07 RX ORDER — ONDANSETRON 2 MG/ML
4 INJECTION INTRAMUSCULAR; INTRAVENOUS EVERY 6 HOURS PRN
Status: DISCONTINUED | OUTPATIENT
Start: 2019-07-07 | End: 2019-07-09 | Stop reason: HOSPADM

## 2019-07-07 RX ORDER — OMEPRAZOLE 20 MG/1
20 CAPSULE, DELAYED RELEASE ORAL DAILY
COMMUNITY

## 2019-07-07 RX ORDER — DEXTROSE, SODIUM CHLORIDE, AND POTASSIUM CHLORIDE 5; .45; .15 G/100ML; G/100ML; G/100ML
125 INJECTION INTRAVENOUS CONTINUOUS
Status: DISCONTINUED | OUTPATIENT
Start: 2019-07-07 | End: 2019-07-08

## 2019-07-07 RX ORDER — SULFAMETHOXAZOLE AND TRIMETHOPRIM 800; 160 MG/1; MG/1
1 TABLET ORAL EVERY 12 HOURS SCHEDULED
Status: COMPLETED | OUTPATIENT
Start: 2019-07-07 | End: 2019-07-09

## 2019-07-07 RX ORDER — FUROSEMIDE 40 MG/1
40 TABLET ORAL 2 TIMES DAILY
Status: DISCONTINUED | OUTPATIENT
Start: 2019-07-07 | End: 2019-07-09 | Stop reason: HOSPADM

## 2019-07-07 RX ORDER — TRAMADOL HYDROCHLORIDE 50 MG/1
50 TABLET ORAL
Status: DISCONTINUED | OUTPATIENT
Start: 2019-07-07 | End: 2019-07-08

## 2019-07-07 RX ORDER — MORPHINE SULFATE 10 MG/ML
8 INJECTION, SOLUTION INTRAMUSCULAR; INTRAVENOUS ONCE
Status: COMPLETED | OUTPATIENT
Start: 2019-07-07 | End: 2019-07-07

## 2019-07-07 RX ADMIN — DEXTROSE, SODIUM CHLORIDE, AND POTASSIUM CHLORIDE 125 ML/HR: 5; .45; .15 INJECTION INTRAVENOUS at 18:45

## 2019-07-07 RX ADMIN — FUROSEMIDE 40 MG: 40 TABLET ORAL at 18:56

## 2019-07-07 RX ADMIN — IOHEXOL 100 ML: 350 INJECTION, SOLUTION INTRAVENOUS at 10:26

## 2019-07-07 RX ADMIN — SODIUM CHLORIDE 1000 ML: 0.9 INJECTION, SOLUTION INTRAVENOUS at 09:37

## 2019-07-07 RX ADMIN — ONDANSETRON 4 MG: 2 INJECTION INTRAMUSCULAR; INTRAVENOUS at 15:32

## 2019-07-07 RX ADMIN — MORPHINE SULFATE 8 MG: 10 INJECTION INTRAVENOUS at 12:40

## 2019-07-07 RX ADMIN — SULFAMETHOXAZOLE AND TRIMETHOPRIM 1 TABLET: 800; 160 TABLET ORAL at 22:20

## 2019-07-07 RX ADMIN — VENLAFAXINE 75 MG: 37.5 TABLET ORAL at 22:21

## 2019-07-07 RX ADMIN — TRAMADOL HYDROCHLORIDE 50 MG: 50 TABLET, FILM COATED ORAL at 21:39

## 2019-07-07 NOTE — ED PROVIDER NOTES
History  Chief Complaint   Patient presents with    Leg Pain     pt c/o right leg pain  dx in may with DVT, on thinner  pain returned, was seen by PCP and given PO ABX for infection  pt concerned now with increased pain in right leg with DVT  denies SOB or chest pain  This is a 66 y o  old female who presents to the ED for evaluation of leg pain  Started with R leg pain started 1m ago, gradually worsening  Spoke with PCP who said pain unusual, started on ABX  Put on Bactrim 5 days ago and has no improvement of pain  Has no redness or warmth  Pain is worse with walking, better at rest   Not responding to Tylenol  The pain is also located in her right lower back  It is unclear for radiate to the leg  Does not radicular in nature  Patient does have a history of endometrial cancer status post bilateral salpingo oophorectomy and hysterectomy about 10 years ago  Her PCP is unclear what caused her current VTE disease and has recommended outpatient CT scan which is scheduled for later today  Also reports a remote history of having a deep space MRSA infection in her back also about 10 years ago treated an outside hospital   She is not clear of the etiology of this infection, but does state she remembers it being MRSA  No history of IV drug use  Previously reported her leg was red and swollen when she was diagnosed with the DVT her that is now improving  Patient denies fever or chills, chest pain, shortness of breath, abdominal pain, nausea, vomiting  Prior to Admission Medications   Prescriptions Last Dose Informant Patient Reported? Taking?    ARIPiprazole (ABILIFY) 10 mg tablet  Self Yes No   Sig: Take 10 mg by mouth daily   Ferrous Sulfate (IRON) 28 MG TABS  Self Yes No   Sig: Take by mouth   MYRBETRIQ 50 MG TB24  Self No No   Sig: TAKE 1 TABLET DAILY   aspirin (ECOTRIN LOW STRENGTH) 81 mg EC tablet  Self Yes No   Sig: Take 81 mg by mouth daily   cholecalciferol (VITAMIN D3) 1,000 units tablet  Self Yes No   Sig: Take 1,000 Units by mouth daily   dexmethylphenidate (FOCALIN XR) 10 MG 24 hr capsule  Self Yes No   Sig: Take 10 mg by mouth daily   furosemide (LASIX) 40 mg tablet  Self No No   Sig: Take 1 tablet (40 mg total) by mouth 2 (two) times a day   gemfibrozil (LOPID) 600 mg tablet  Self No No   Sig: Take 1 tablet (600 mg total) by mouth daily   metFORMIN (GLUCOPHAGE) 500 mg tablet  Self No No   Sig: Take 1 tablet (500 mg total) by mouth daily   oxybutynin (DITROPAN-XL) 10 MG 24 hr tablet  Self No No   Sig: TAKE 1 TABLET BY MOUTH EVERY DAY   potassium chloride (K-DUR,KLOR-CON) 20 mEq tablet  Self No No   Sig: Take 1 tablet (20 mEq total) by mouth 2 (two) times a day   quinapril (ACCUPRIL) 5 mg tablet  Self No No   Sig: TAKE 1 TABLET BY MOUTH DAILY AT BEDTIME   rivaroxaban (XARELTO) 15 & 20 MG starter pack  Self No No   Sig: 15 mg bid x 21 days then 20 mg daily   rivaroxaban (XARELTO) 20 mg tablet  Self No No   Sig: Take 1 tablet (20 mg total) by mouth daily with breakfast   sulfamethoxazole-trimethoprim (BACTRIM DS) 800-160 mg per tablet   No Yes   Sig: Take 1 tablet by mouth every 12 (twelve) hours for 7 days   traMADol (ULTRAM) 50 mg tablet  Self No No   Sig: Take 1 tablet (50 mg total) by mouth daily at bedtime as needed for moderate pain   venlafaxine (EFFEXOR) 75 mg tablet  Self Yes No   Sig: Take 75 mg by mouth 3 (three) times a day        Facility-Administered Medications: None     Past Medical History:   Diagnosis Date    Anemia     Depression     Diabetes mellitus (HCC)     Endometrial cancer [address] [address] 12/2017    Hyperglycemia     vx type 2 dm -- last assessed 4/1/14; resolved 11/7/17    Hyperlipidemia     Hypertension     Obesity     last assessed 10/14/17; resolved 11/7/17     Past Surgical History:   Procedure Laterality Date    ABDOMINAL SURGERY      GASTRIC BYPASS    CHOLECYSTECTOMY      at the time of gastric bypass    COLONOSCOPY      GASTRIC BYPASS      HYSTERECTOMY Bilateral total abdominal with salpingo-oophorectomy    OOPHORECTOMY Bilateral     OH LAP, RADICAL HYST W/ TUBE&OV, NODE BX N/A 12/19/2017    Procedure: HYSTERECTOMY LAPAROSCOPIC TOTAL ([address]) W/ ROBOTICS; BILATERAL SALPINGOOPHERECTOMY; LYMPH NODE DISSECTION; lysis of adhesions;  Surgeon: Mady Velazquez MD;  [facility];  Service: Gynecology Oncology    OH LAP,DIAGNOSTIC ABDOMEN N/A 12/19/2017    Procedure: LAPAROSCOPY DIAGNOSTIC;  Surgeon: Mady Velazquez MD;  [facility];  Service: Gynecology Oncology    TONSILLECTOMY      US GUIDED BREAST BIOPSY RIGHT COMPLETE Right 6/28/2019     Family History   Problem Relation Age of Onset    Other Mother         dyslipidemia    Ovarian cancer Mother 48    Lymphoma Father     Bone cancer Maternal Grandfather     No Known Problems Sister     No Known Problems Maternal Grandmother     No Known Problems Paternal Grandmother     No Known Problems Paternal Grandfather     No Known Problems Maternal Aunt     No Known Problems Maternal Aunt     No Known Problems Maternal Aunt     No Known Problems Maternal Aunt     No Known Problems Paternal Aunt     No Known Problems Paternal Aunt     No Known Problems Paternal Aunt     No Known Problems Paternal Aunt     No Known Problems Brother     No Known Problems Brother      I have reviewed and agree with the history as documented  Social History     Tobacco Use    Smoking status: Never Smoker    Smokeless tobacco: Never Used   Substance Use Topics    Alcohol use: No    Drug use: No      Review of Systems   Constitutional: Negative for chills, fatigue, fever and unexpected weight change  HENT: Negative for congestion, rhinorrhea and sore throat  Eyes: Negative for redness and visual disturbance  Respiratory: Negative for cough and shortness of breath  Cardiovascular: Negative for chest pain and leg swelling     Gastrointestinal: Negative for abdominal pain, constipation, diarrhea, nausea and vomiting  Endocrine: Negative for cold intolerance and heat intolerance  Genitourinary: Negative for dysuria, frequency and urgency  Musculoskeletal: Positive for back pain  Skin: Negative for rash  Neurological: Negative for dizziness, syncope and numbness  All other systems reviewed and are negative  Physical Exam  Physical Exam   Constitutional: She is oriented to person, place, and time  She appears well-developed and well-nourished  No distress  Obese   HENT:   Head: Normocephalic and atraumatic  Nose: Nose normal    Mouth/Throat: No oropharyngeal exudate  Eyes: Pupils are equal, round, and reactive to light  Conjunctivae and EOM are normal    Neck: Normal range of motion  Neck supple  Cardiovascular: Normal rate, regular rhythm and normal heart sounds  Exam reveals no gallop  No murmur heard  Pulmonary/Chest: Effort normal and breath sounds normal  She has no wheezes  She exhibits no tenderness  Abdominal: Soft  Bowel sounds are normal  She exhibits no distension  There is no tenderness  There is no rebound and no guarding  Musculoskeletal: Normal range of motion  She exhibits no tenderness or deformity  Back:    2+ DP, PT pulses, 5/5 strength in the right lower extremity, limited slightly secondary to pain  Lymphadenopathy:     She has no cervical adenopathy  Neurological: She is alert and oriented to person, place, and time  No cranial nerve deficit  Skin: Skin is warm and dry  No rash noted  She is not diaphoretic  No erythema  Psychiatric: She has a normal mood and affect  Nursing note and vitals reviewed      Vital Signs  ED Triage Vitals [07/07/19 0852]   Temperature Pulse Respirations Blood Pressure SpO2   97 8 °F (36 6 °C) 71 18 140/65 98 %      Temp Source Heart Rate Source Patient Position - Orthostatic VS BP Location FiO2 (%)   Oral Monitor Sitting Right arm --      Pain Score       8         ED Medications  Medications   sodium chloride 0 9 % bolus 1,000 mL (0 mL Intravenous Stopped 7/7/19 1037)   iohexol (OMNIPAQUE) 350 MG/ML injection (MULTI-DOSE) 100 mL (100 mL Intravenous Given 7/7/19 1026)   morphine (PF) 10 mg/mL injection 8 mg (8 mg Intravenous Given 7/7/19 1240)     Diagnostic Studies  Results Reviewed     Procedure Component Value Units Date/Time    C-reactive protein [[id number]]  (Abnormal) Collected:  07/07/19 0938    Lab Status:  Final result Specimen:  Blood from Arm, Right Updated:  07/07/19 1036     CRP 34 1 mg/L     Magnesium [[id number]]  (Normal) Collected:  07/07/19 0938    Lab Status:  Final result Specimen:  Blood from Arm, Right Updated:  07/07/19 1030     Magnesium 1 8 mg/dL     Protime-INR [[id number]]  (Abnormal) Collected:  07/07/19 0938    Lab Status:  Final result Specimen:  Blood from Arm, Right Updated:  07/07/19 1001     Protime 20 9 seconds      INR 1 89    APTT [[id number]]  (Abnormal) Collected:  07/07/19 0938    Lab Status:  Final result Specimen:  Blood from Arm, Right Updated:  07/07/19 1001     PTT 53 seconds     Comprehensive metabolic panel [[id number]] Collected:  07/07/19 0938    Lab Status:  Final result Specimen:  Blood from Arm, Right Updated:  07/07/19 1000     Sodium 138 mmol/L      Potassium 4 0 mmol/L      Chloride 103 mmol/L      CO2 22 mmol/L      ANION GAP 13 mmol/L      BUN 11 mg/dL      Creatinine 0 71 mg/dL      Glucose 115 mg/dL      Calcium 9 5 mg/dL      AST 13 U/L      ALT 19 U/L      Alkaline Phosphatase 93 U/L      Total Protein 7 9 g/dL      Albumin 3 6 g/dL      Total Bilirubin 0 20 mg/dL      eGFR 92 ml/min/1 73sq m     Narrative:       [place] guidelines for Chronic Kidney Disease (CKD):     Stage 1 with normal or high GFR (GFR > 90 mL/min/1 73 square meters)    Stage 2 Mild CKD (GFR = 60-89 mL/min/1 73 square meters)    Stage 3A Moderate CKD (GFR = 45-59 mL/min/1 73 square meters)    Stage 3B Moderate CKD (GFR = 30-44 mL/min/1 73 square meters)    Stage 4 Severe CKD (GFR = 15-29 mL/min/1 73 square meters)    Stage 5 End Stage CKD (GFR <15 mL/min/1 73 square meters)  Note: GFR calculation is accurate only with a steady state creatinine    CBC and differential [[id number]]  (Abnormal) Collected:  07/07/19 0938    Lab Status:  Final result Specimen:  Blood from Arm, Right Updated:  07/07/19 0950     WBC 11 61 Thousand/uL      RBC 3 66 Million/uL      Hemoglobin 10 5 g/dL      Hematocrit 32 5 %      MCV 89 fL      MCH 28 7 pg      MCHC 32 3 g/dL      RDW 14 2 %      MPV 8 7 fL      Platelets 937 Thousands/uL      nRBC 0 /100 WBCs      Neutrophils Relative 74 %      Immat GRANS % 1 %      Lymphocytes Relative 16 %      Monocytes Relative 8 %      Eosinophils Relative 0 %      Basophils Relative 1 %      Neutrophils Absolute 8 54 Thousands/µL      Immature Grans Absolute 0 11 Thousand/uL      Lymphocytes Absolute 1 90 Thousands/µL      Monocytes Absolute 0 94 Thousand/µL      Eosinophils Absolute 0 05 Thousand/µL      Basophils Absolute 0 07 Thousands/µL         CT chest abdomen pelvis w contrast   Final Result by Ranjan Torres DO (07/07 1107)   1   5 cm right-sided pelvic mass, encasing the right femoral artery and vein as well as the right ureter  This is likely the cause of the patient's right lower extremity DVT  The mass is resulting in moderate right-sided hydroureteronephrosis, up to    the level of the pelvic inlet  The patient has a remote history of endometrial carcinoma  There are prominent lymph nodes along the right internal and external iliac deb chains  Correlate for recurrent disease/metastatic disease  Biopsy    recommended  2   Moderate right-sided hydroureteronephrosis, up to the level of the pelvic mass  There is severe stenosis of the ureter at the level of the soft tissue mass  Distally, the right ureter is visualized, however diminutive in size     3   Unremarkable enhanced CT scan of the chest              I personally discussed this study with JORGE ALBERTO AMEZQUITA on 7/7/2019 at 10:53 AM                Workstation performed: [id number]           Procedures  Procedures    ED Course      A/P: This is a 66 y o  female who presents to the ED for evaluation of leg pain  Patient's exam is out of proportion with her current findings  She does appear to be neurovascularly intact, therefore doubt vascular catastrophe  Unlikely to be phlegmasia cerulea verses [place] based on physical exam   History of a deep space back infection and this vague back and leg pain is concerning for recurrent infection versus hematoma  We will get labs, CT scan chest abdomen pelvis for evaluation of these concerns as well as to facilitate her outpatient evaluation for possible recurrent oncologic disease  1100 Received a call from Radiology  There is a cystic mass in the right pelvis likely causing a DVT  It is involving the ureter  He is requesting delay imaging for further evaluation  1235 patient was informed of the findings  Case discussed with Dr Paulie Chan of gynecology Oncology  He recommends transfer to his service at [facility] for further evaluation and management      MDM    Disposition  Final diagnoses:   Pelvic mass   Acute deep vein thrombosis (DVT) of iliac vein of right lower extremity ([facility] )     Time reflects when diagnosis was documented in both MDM as applicable and the Disposition within this note     Time User Action Codes Description Comment    7/7/2019 12:43 PM Sher Lever Add [R19 00] Pelvic mass     7/7/2019 12:43 PM Sher Lever Add [O22 30,  I82 409] DVT (deep vein thrombosis) in pregnancy ([email or web address] )     7/7/2019 12:44 PM Sher Lever Remove [O22 30,  I82 409] DVT (deep vein thrombosis) in pregnancy ([email or web address] )     7/7/2019 12:44 PM Sher Lever Add [I82 421] Acute deep vein thrombosis (DVT) of iliac vein of right lower extremity [facility])       ED Disposition     ED Disposition Condition Date/Time Comment    Transfer to Another [facility] Jul 7, 2019 12:43 PM Tonya Nicolas should be transferred out to [place] [address]  Follow-up Information    None       Patient's Medications   Discharge Prescriptions    No medications on file     No discharge procedures on file      ED Provider  Electronically Signed by           Samira Ga MD  07/07/19 3012

## 2019-07-07 NOTE — ED NOTES
Per B  Stella Johnson MD, Jayna CAMERON  [address]  Room [id number]    Report to be called after Swetha Snider RN  07/07/19 0311

## 2019-07-07 NOTE — H&P
H&P - GYN ONC  Sandra Melendez 66 y o  female MRN: [id number]  [facility] Encounter: [id number]      History of Present Illness     Chief Complaint:  Right-sided pelvic pain    HPI:  Jack Richardson is a 66 y o   with stage IB endometrial cancer status post robot assisted total laparoscopic hysterectomy, bilateral salpingo-oophorectomy, and pelvic lymph node dissection on 12/2017, history of deep vein thrombosis dx'd 5/2019 on Xarelto, presenting to [facility] for pain management at right pelvis, right groin, right lower extremity, and right flank  Patient states that over the past few weeks she has had intermittent right sided pelvic/back pain that ranges from asymptomatic to extremely painful  She states that she was scheduled to go for a CT chest/abdomen/pelvis for follow-up of her stage IB endometrial cancer but reports that she was experiencing significant pain at home, therefore decided to go into [facility] for further evaluation  CT abdomen/chest/pelvis with contrast was performed on 07/07/2019 showing a 5 cm right-sided pelvic mass, encasing the right femoral artery and vein as well as the right ureter-likely the cause of the patient's right lower extremity DVT  The mass is resulting in moderate right-sided hydronephrosis up to the level of the pelvic inlet  There are prominent lymph nodes along the right internal and external iliac node chains, recommended correlation for recurrent disease/metastatic disease  Due to suspicion of potential disease progression and need for gyn Oncology evaluation, patient was transferred to [facility] for further management  At bedside, patient is currently well appearing and denies any pain symptoms currently  She states that earlier she was having significant pain and nausea but that the symptoms have currently abated, she does however relate that they may be gradually returning at this time    Currently, she denies any abdominal/pelvic pain, nausea, vomiting, fever, chills, chest pain, shortness of breath, palpitations, dizziness, fatigue, changes in urinary/bowel function, hematuria, or hematemesis  Review of Systems   Constitutional: Negative for chills, diaphoresis, fatigue and fever  Respiratory: Negative for cough, choking, chest tightness, shortness of breath and wheezing  Cardiovascular: Negative for chest pain, palpitations and leg swelling  Gastrointestinal: Negative for abdominal distention, abdominal pain, constipation, diarrhea, nausea and vomiting  Genitourinary: Negative for dysuria, flank pain, frequency, hematuria, pelvic pain, vaginal bleeding, vaginal discharge and vaginal pain  Neurological: Negative for dizziness, seizures, syncope, facial asymmetry, weakness, light-headedness, numbness and headaches  Psychiatric/Behavioral: Negative          Historical Information   Past Medical History:   Diagnosis Date    Anemia     Depression     Diabetes mellitus ([facility] )     Endometrial cancer ([facility] ) 12/2017    Hyperglycemia     vx type 2 dm -- last assessed 4/1/14; resolved 11/7/17    Hyperlipidemia     Hypertension     Obesity     last assessed 10/14/17; resolved 11/7/17     Past Surgical History:   Procedure Laterality Date    ABDOMINAL SURGERY      GASTRIC BYPASS    CHOLECYSTECTOMY      at the time of gastric bypass    COLONOSCOPY      GASTRIC BYPASS      HYSTERECTOMY Bilateral     total abdominal with salpingo-oophorectomy    OOPHORECTOMY Bilateral     MS LAP, RADICAL HYST W/ TUBE&OV, NODE BX N/A 12/19/2017    Procedure: HYSTERECTOMY LAPAROSCOPIC TOTAL ([address] [place] [address]) W/ ROBOTICS; BILATERAL SALPINGOOPHERECTOMY; LYMPH NODE DISSECTION; lysis of adhesions;  Surgeon: Octaviano Ruiz MD;  Location: [facility];  Service: Gynecology Oncology    MS LAP,DIAGNOSTIC ABDOMEN N/A 12/19/2017    Procedure: LAPAROSCOPY DIAGNOSTIC;  Surgeon: Octaviano Ruiz MD;  [facility];  Service: Gynecology Oncology    TONSILLECTOMY      US GUIDED BREAST BIOPSY RIGHT COMPLETE Right 6/28/2019     Social History   Social History     Substance and Sexual Activity   Alcohol Use No     Social History     Substance and Sexual Activity   Drug Use No     Social History     Tobacco Use   Smoking Status Never Smoker   Smokeless Tobacco Never Used     Family History: non-contributory    Meds/Allergies      Medications Prior to Admission   Medication    ARIPiprazole (ABILIFY) 10 mg tablet    aspirin (ECOTRIN LOW STRENGTH) 81 mg EC tablet    cholecalciferol (VITAMIN D3) 1,000 units tablet    dexmethylphenidate (FOCALIN XR) 10 MG 24 hr capsule    Ferrous Sulfate (IRON) 28 MG TABS    furosemide (LASIX) 40 mg tablet    gemfibrozil (LOPID) 600 mg tablet    metFORMIN (GLUCOPHAGE) 500 mg tablet    MYRBETRIQ 50 MG TB24    omeprazole (PriLOSEC) 20 mg delayed release capsule    oxybutynin (DITROPAN-XL) 10 MG 24 hr tablet    potassium chloride (K-DUR,KLOR-CON) 20 mEq tablet    quinapril (ACCUPRIL) 5 mg tablet    rivaroxaban (XARELTO) 20 mg tablet    sulfamethoxazole-trimethoprim (BACTRIM DS) 800-160 mg per tablet    venlafaxine (EFFEXOR) 75 mg tablet    rivaroxaban (XARELTO) 15 & 20 MG starter pack    traMADol (ULTRAM) 50 mg tablet        Allergies   Allergen Reactions    Cephalosporins Other (See Comments)     RASH - BILATERAL ARMS PER RN       OBJECTIVE:    Vitals: Blood pressure 105/68, pulse 78, temperature 98 4 °F (36 9 °C), resp  rate 18  There is no height or weight on file to calculate BMI  Physical Exam   Constitutional: She is oriented to person, place, and time  She appears well-developed and well-nourished  No distress  HENT:   Head: Normocephalic and atraumatic  Nose: Nose normal    Mouth/Throat: Oropharynx is clear and moist    Cardiovascular: Normal rate, regular rhythm, normal heart sounds and intact distal pulses  Pulmonary/Chest: Effort normal and breath sounds normal  No stridor   No respiratory distress  Abdominal: Soft  Bowel sounds are normal  She exhibits no distension and no mass  There is tenderness (Minimal tenderness noted at right upper quadrant/right lower quadrant)  There is no rebound and no guarding  Musculoskeletal: Normal range of motion  She exhibits no edema, tenderness or deformity  Neurological: She is alert and oriented to person, place, and time  She displays normal reflexes  She exhibits normal muscle tone  Skin: Skin is warm and dry  She is not diaphoretic  No erythema  No pallor  Psychiatric: She has a normal mood and affect  Her behavior is normal  Judgment and thought content normal          Invasive Devices     Peripheral Intravenous Line            Peripheral IV 07/07/19 Right Antecubital less than 1 day                    History of endometrial cancer     11/17/2017 Initial Diagnosis       Endometrial cancer [facility])        12/19/2017 Surgery       Robotic assisted total laparoscopic hysterectomy with bilateral salpingo-oophorectomy and sentinel bilateral pelvic lymph node dissection  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 (4 4 x 3 2 cm tumor, 9 4/15 4mm invasion, NO LVSI, washings revealed atypical cellular changes)        12/19/2017 Genetic Testing       [facility] testing negative             Assessment/Plan     ASSESSMENT:    Maci Duffy 66 y o  s/p RATLH BSO PLND 12/2017 for Stage IB endometrial cancer, hx of DVT on Xarelto, with new right sided pelvic mass and prominent lymph nodes on CT, suspicious for metastatic disease      PLAN:     1) 5 cm soft tissue mass at the level of the pelvic inlet, concerns for metastatic disease  - Interventional Radiology consult for biopsy of lymph node/mass  -will hold Xarelto per IR recommendations (last dose 0600 7/7)  -NPO at midnight    2) Stage IB endometrial cancer  - s/p RATLH BSO PLND 12/2017  - Consult Radiation oncology  - Consult palliative for pain management    3) Right lower extremity DVT  - hold Xarelto at this time    4) HTN  - Lasix 40mg BID  - home quinapril, linisopril as equivalent    5) HLD  - home gemfibrozil    6) GERD  - home omeprazole, protonix as equivalent    7) Depression  - home Effexor    FEN: CHO level 2 with NPO at midnight, D5 1/2NS KCl 20mEq at 125mL/hr  DVT PPx: home xarelto held for possible IR procedure in AM, SCDs    Mazin Bo MD  OB/GYN PGY-4  7/7/2019 7:20 PM

## 2019-07-07 NOTE — EMTALA/ACUTE CARE TRANSFER
[address] [place] [address]  Dept: [phone]      EMTALA TRANSFER CONSENT    NAME Verona Mann                                         1955                              MRN [id number]    I have been informed of my rights regarding examination, treatment, and transfer   by Dr Jason Dela Cruz MD    Benefits: Specialized equipment and/or services available at the receiving facility (Include comment)________________________(GYN Oncology)    Risks: Potential for delay in receiving treatment, Potential deterioration of medical condition, Loss of IV, Increased discomfort during transfer, Possible worsening of condition or death during transfer      Consent for Transfer:  I acknowledge that my medical condition has been evaluated and explained to me by the emergency department physician or other qualified medical person and/or my attending physician, who has recommended that I be transferred to the service of  Accepting Physician: Dr Mara Bautista at [address] [facility] Name, [address] : [place], [place]  The above potential benefits of such transfer, the potential risks associated with such transfer, and the probable risks of not being transferred have been explained to me, and I fully understand them  The doctor has explained that, in my case, the benefits of transfer outweigh the risks  I agree to be transferred  I authorize the performance of emergency medical procedures and treatments upon me in both transit and upon arrival at the receiving facility  Additionally, I authorize the release of any and all medical records to the receiving facility and request they be transported with me, if possible  I understand that the safest mode of transportation during a medical emergency is an ambulance and that the Hospital advocates the use of this mode of transport   Risks of traveling to the receiving facility by car, including absence of medical control, life sustaining equipment, such as oxygen, and medical personnel has been explained to me and I fully understand them  (DEVAUGHN CORRECT BOX BELOW)  [  ]  I consent to the stated transfer and to be transported by ambulance/helicopter  [  ]  I consent to the stated transfer, but refuse transportation by ambulance and accept full responsibility for my transportation by car  I understand the risks of non-ambulance transfers and I exonerate the Hospital and its staff from any deterioration in my condition that results from this refusal     X___________________________________________    DATE  19  TIME________  Signature of patient or legally responsible individual signing on patient behalf           RELATIONSHIP TO PATIENT_________________________          Provider Certification    NAME Ernestina Rousseau                                         1955                              MRN [id number]    A medical screening exam was performed on the above named patient  Based on the examination:    Condition Necessitating Transfer The primary encounter diagnosis was Pelvic mass  A diagnosis of Acute deep vein thrombosis (DVT) of iliac vein of right lower extremity (HCC) was also pertinent to this visit      Patient Condition: The patient has been stabilized such that within reasonable medical probability, no material deterioration of the patient condition or the condition of the unborn child(kimi) is likely to result from the transfer    Reason for Transfer: Level of Care needed not available at this facility    Transfer Requirements: [address] [place], [place]   · Space available and qualified personnel available for treatment as acknowledged by Esther Warren  · Agreed to accept transfer and to provide appropriate medical treatment as acknowledged by       Dr Adelia Jimenez  · Appropriate medical records of the examination and treatment of the patient are provided at the time of transfer   STAFF INITIAL WHEN COMPLETED _______  · Transfer will be performed by qualified personnel from [facility]  and appropriate transfer equipment as required, including the use of necessary and appropriate life support measures  Provider Certification: I have examined the patient and explained the following risks and benefits of being transferred/refusing transfer to the patient/family:  General risk, such as traffic hazards, adverse weather conditions, rough terrain or turbulence, possible failure of equipment (including vehicle or aircraft), or consequences of actions of persons outside the control of the transport personnel, Unanticipated needs of medical equipment and personnel during transport, Risk of worsening condition      Based on these reasonable risks and benefits to the patient and/or the unborn child(kimi), and based upon the information available at the time of the patients examination, I certify that the medical benefits reasonably to be expected from the provision of appropriate medical treatments at another medical facility outweigh the increasing risks, if any, to the individuals medical condition, and in the case of labor to the unborn child, from effecting the transfer      X____________________________________________ DATE 07/07/19        TIME_______      ORIGINAL - SEND TO MEDICAL RECORDS   COPY - SEND WITH PATIENT DURING TRANSFER

## 2019-07-07 NOTE — ED NOTES
Call from PACs reports that [place] is "on a 911 call and will be in after"     Ivy Mckenzie RN  07/07/19 [id number]

## 2019-07-08 ENCOUNTER — APPOINTMENT (INPATIENT)
Dept: RADIOLOGY | Facility: HOSPITAL | Age: 64
DRG: 357 | End: 2019-07-08
Payer: COMMERCIAL

## 2019-07-08 LAB
ABO GROUP BLD: NORMAL
ALBUMIN SERPL BCP-MCNC: 3.7 G/DL (ref 3.5–5)
ALP SERPL-CCNC: 134 U/L (ref 46–116)
ALT SERPL W P-5'-P-CCNC: 63 U/L (ref 12–78)
ANION GAP SERPL CALCULATED.3IONS-SCNC: 7 MMOL/L (ref 4–13)
APTT PPP: 39 SECONDS (ref 23–37)
AST SERPL W P-5'-P-CCNC: 31 U/L (ref 5–45)
BASOPHILS # BLD AUTO: 0.06 THOUSANDS/ΜL (ref 0–0.1)
BASOPHILS NFR BLD AUTO: 0 % (ref 0–1)
BILIRUB SERPL-MCNC: 0.27 MG/DL (ref 0.2–1)
BLD GP AB SCN SERPL QL: NEGATIVE
BUN SERPL-MCNC: 9 MG/DL (ref 5–25)
CALCIUM SERPL-MCNC: 9.5 MG/DL (ref 8.3–10.1)
CHLORIDE SERPL-SCNC: 102 MMOL/L (ref 100–108)
CO2 SERPL-SCNC: 26 MMOL/L (ref 21–32)
CREAT SERPL-MCNC: 0.65 MG/DL (ref 0.6–1.3)
EOSINOPHIL # BLD AUTO: 0.13 THOUSAND/ΜL (ref 0–0.61)
EOSINOPHIL NFR BLD AUTO: 1 % (ref 0–6)
ERYTHROCYTE [DISTWIDTH] IN BLOOD BY AUTOMATED COUNT: 14.2 % (ref 11.6–15.1)
GFR SERPL CREATININE-BSD FRML MDRD: 96 ML/MIN/1.73SQ M
GLUCOSE SERPL-MCNC: 100 MG/DL (ref 65–140)
GLUCOSE SERPL-MCNC: 128 MG/DL (ref 65–140)
GLUCOSE SERPL-MCNC: 131 MG/DL (ref 65–140)
GLUCOSE SERPL-MCNC: 141 MG/DL (ref 65–140)
HCT VFR BLD AUTO: 33.3 % (ref 34.8–46.1)
HGB BLD-MCNC: 10.5 G/DL (ref 11.5–15.4)
IMM GRANULOCYTES # BLD AUTO: 0.1 THOUSAND/UL (ref 0–0.2)
IMM GRANULOCYTES NFR BLD AUTO: 1 % (ref 0–2)
INR PPP: 1.15 (ref 0.84–1.19)
LYMPHOCYTES # BLD AUTO: 1.88 THOUSANDS/ΜL (ref 0.6–4.47)
LYMPHOCYTES NFR BLD AUTO: 14 % (ref 14–44)
MAGNESIUM SERPL-MCNC: 1.9 MG/DL (ref 1.6–2.6)
MCH RBC QN AUTO: 27.9 PG (ref 26.8–34.3)
MCHC RBC AUTO-ENTMCNC: 31.5 G/DL (ref 31.4–37.4)
MCV RBC AUTO: 88 FL (ref 82–98)
MONOCYTES # BLD AUTO: 1.2 THOUSAND/ΜL (ref 0.17–1.22)
MONOCYTES NFR BLD AUTO: 9 % (ref 4–12)
NEUTROPHILS # BLD AUTO: 10.21 THOUSANDS/ΜL (ref 1.85–7.62)
NEUTS SEG NFR BLD AUTO: 75 % (ref 43–75)
NRBC BLD AUTO-RTO: 0 /100 WBCS
PHOSPHATE SERPL-MCNC: 3.2 MG/DL (ref 2.3–4.1)
PLATELET # BLD AUTO: 493 THOUSANDS/UL (ref 149–390)
PMV BLD AUTO: 9.9 FL (ref 8.9–12.7)
POTASSIUM SERPL-SCNC: 3.7 MMOL/L (ref 3.5–5.3)
PROT SERPL-MCNC: 8.4 G/DL (ref 6.4–8.2)
PROTHROMBIN TIME: 14.3 SECONDS (ref 11.6–14.5)
RBC # BLD AUTO: 3.77 MILLION/UL (ref 3.81–5.12)
RH BLD: POSITIVE
SODIUM SERPL-SCNC: 135 MMOL/L (ref 136–145)
SPECIMEN EXPIRATION DATE: NORMAL
WBC # BLD AUTO: 13.58 THOUSAND/UL (ref 4.31–10.16)

## 2019-07-08 PROCEDURE — 86901 BLOOD TYPING SEROLOGIC RH(D): CPT | Performed by: OBSTETRICS & GYNECOLOGY

## 2019-07-08 PROCEDURE — 88305 TISSUE EXAM BY PATHOLOGIST: CPT | Performed by: PATHOLOGY

## 2019-07-08 PROCEDURE — 99152 MOD SED SAME PHYS/QHP 5/>YRS: CPT

## 2019-07-08 PROCEDURE — 88334 PATH CONSLTJ SURG CYTO XM EA: CPT | Performed by: PATHOLOGY

## 2019-07-08 PROCEDURE — 38505 NEEDLE BIOPSY LYMPH NODES: CPT

## 2019-07-08 PROCEDURE — 88341 IMHCHEM/IMCYTCHM EA ADD ANTB: CPT | Performed by: PATHOLOGY

## 2019-07-08 PROCEDURE — 86900 BLOOD TYPING SEROLOGIC ABO: CPT | Performed by: OBSTETRICS & GYNECOLOGY

## 2019-07-08 PROCEDURE — 99255 IP/OBS CONSLTJ NEW/EST HI 80: CPT | Performed by: INTERNAL MEDICINE

## 2019-07-08 PROCEDURE — 88342 IMHCHEM/IMCYTCHM 1ST ANTB: CPT | Performed by: PATHOLOGY

## 2019-07-08 PROCEDURE — 84100 ASSAY OF PHOSPHORUS: CPT | Performed by: OBSTETRICS & GYNECOLOGY

## 2019-07-08 PROCEDURE — 77012 CT SCAN FOR NEEDLE BIOPSY: CPT

## 2019-07-08 PROCEDURE — 83735 ASSAY OF MAGNESIUM: CPT | Performed by: OBSTETRICS & GYNECOLOGY

## 2019-07-08 PROCEDURE — 38505 NEEDLE BIOPSY LYMPH NODES: CPT | Performed by: RADIOLOGY

## 2019-07-08 PROCEDURE — 88333 PATH CONSLTJ SURG CYTO XM 1: CPT | Performed by: PATHOLOGY

## 2019-07-08 PROCEDURE — 85730 THROMBOPLASTIN TIME PARTIAL: CPT | Performed by: OBSTETRICS & GYNECOLOGY

## 2019-07-08 PROCEDURE — 85025 COMPLETE CBC W/AUTO DIFF WBC: CPT | Performed by: OBSTETRICS & GYNECOLOGY

## 2019-07-08 PROCEDURE — 85610 PROTHROMBIN TIME: CPT | Performed by: OBSTETRICS & GYNECOLOGY

## 2019-07-08 PROCEDURE — 99221 1ST HOSP IP/OBS SF/LOW 40: CPT | Performed by: OBSTETRICS & GYNECOLOGY

## 2019-07-08 PROCEDURE — 80053 COMPREHEN METABOLIC PANEL: CPT | Performed by: OBSTETRICS & GYNECOLOGY

## 2019-07-08 PROCEDURE — 77012 CT SCAN FOR NEEDLE BIOPSY: CPT | Performed by: RADIOLOGY

## 2019-07-08 PROCEDURE — 99152 MOD SED SAME PHYS/QHP 5/>YRS: CPT | Performed by: RADIOLOGY

## 2019-07-08 PROCEDURE — 07BC3ZX EXCISION OF PELVIS LYMPHATIC, PERCUTANEOUS APPROACH, DIAGNOSTIC: ICD-10-PCS | Performed by: RADIOLOGY

## 2019-07-08 PROCEDURE — 86850 RBC ANTIBODY SCREEN: CPT | Performed by: OBSTETRICS & GYNECOLOGY

## 2019-07-08 PROCEDURE — 99153 MOD SED SAME PHYS/QHP EA: CPT

## 2019-07-08 PROCEDURE — 82948 REAGENT STRIP/BLOOD GLUCOSE: CPT

## 2019-07-08 RX ORDER — DIAZEPAM 5 MG/ML
INJECTION, SOLUTION INTRAMUSCULAR; INTRAVENOUS CODE/TRAUMA/SEDATION MEDICATION
Status: COMPLETED | OUTPATIENT
Start: 2019-07-08 | End: 2019-07-08

## 2019-07-08 RX ORDER — FENTANYL CITRATE 50 UG/ML
INJECTION, SOLUTION INTRAMUSCULAR; INTRAVENOUS CODE/TRAUMA/SEDATION MEDICATION
Status: COMPLETED | OUTPATIENT
Start: 2019-07-08 | End: 2019-07-08

## 2019-07-08 RX ORDER — HYDROMORPHONE HCL/PF 1 MG/ML
0.2 SYRINGE (ML) INJECTION EVERY 4 HOURS PRN
Status: DISCONTINUED | OUTPATIENT
Start: 2019-07-08 | End: 2019-07-08

## 2019-07-08 RX ORDER — MIDAZOLAM HYDROCHLORIDE 1 MG/ML
INJECTION INTRAMUSCULAR; INTRAVENOUS CODE/TRAUMA/SEDATION MEDICATION
Status: COMPLETED | OUTPATIENT
Start: 2019-07-08 | End: 2019-07-08

## 2019-07-08 RX ORDER — LIDOCAINE 50 MG/G
1 PATCH TOPICAL DAILY
Status: COMPLETED | OUTPATIENT
Start: 2019-07-08 | End: 2019-07-09

## 2019-07-08 RX ORDER — AMOXICILLIN 250 MG
1 CAPSULE ORAL
Status: DISCONTINUED | OUTPATIENT
Start: 2019-07-08 | End: 2019-07-09 | Stop reason: HOSPADM

## 2019-07-08 RX ORDER — HYDROMORPHONE HCL/PF 1 MG/ML
0.2 SYRINGE (ML) INJECTION EVERY 4 HOURS PRN
Status: DISCONTINUED | OUTPATIENT
Start: 2019-07-08 | End: 2019-07-09

## 2019-07-08 RX ORDER — HYDROMORPHONE HCL/PF 1 MG/ML
0.5 SYRINGE (ML) INJECTION EVERY 4 HOURS PRN
Status: DISCONTINUED | OUTPATIENT
Start: 2019-07-08 | End: 2019-07-08

## 2019-07-08 RX ORDER — DIPHENHYDRAMINE HYDROCHLORIDE 50 MG/ML
INJECTION INTRAMUSCULAR; INTRAVENOUS CODE/TRAUMA/SEDATION MEDICATION
Status: COMPLETED | OUTPATIENT
Start: 2019-07-08 | End: 2019-07-08

## 2019-07-08 RX ORDER — ACETAMINOPHEN 325 MG/1
975 TABLET ORAL EVERY 8 HOURS SCHEDULED
Status: DISCONTINUED | OUTPATIENT
Start: 2019-07-08 | End: 2019-07-09 | Stop reason: HOSPADM

## 2019-07-08 RX ORDER — POLYETHYLENE GLYCOL 3350 17 G/17G
17 POWDER, FOR SOLUTION ORAL DAILY PRN
Status: DISCONTINUED | OUTPATIENT
Start: 2019-07-08 | End: 2019-07-09 | Stop reason: HOSPADM

## 2019-07-08 RX ORDER — POTASSIUM CHLORIDE AND SODIUM CHLORIDE 450; 150 MG/100ML; MG/100ML
125 INJECTION, SOLUTION INTRAVENOUS CONTINUOUS
Status: DISCONTINUED | OUTPATIENT
Start: 2019-07-08 | End: 2019-07-09 | Stop reason: HOSPADM

## 2019-07-08 RX ORDER — OXYCODONE HYDROCHLORIDE 5 MG/1
5 TABLET ORAL EVERY 4 HOURS PRN
Status: DISCONTINUED | OUTPATIENT
Start: 2019-07-08 | End: 2019-07-09 | Stop reason: HOSPADM

## 2019-07-08 RX ORDER — OXYCODONE HYDROCHLORIDE 5 MG/1
2.5 TABLET ORAL EVERY 4 HOURS PRN
Status: DISCONTINUED | OUTPATIENT
Start: 2019-07-08 | End: 2019-07-09 | Stop reason: HOSPADM

## 2019-07-08 RX ADMIN — FENTANYL CITRATE 50 MCG: 50 INJECTION, SOLUTION INTRAMUSCULAR; INTRAVENOUS at 12:47

## 2019-07-08 RX ADMIN — FUROSEMIDE 40 MG: 40 TABLET ORAL at 08:18

## 2019-07-08 RX ADMIN — HYDROMORPHONE HYDROCHLORIDE 0.2 MG: 1 INJECTION, SOLUTION INTRAMUSCULAR; INTRAVENOUS; SUBCUTANEOUS at 07:35

## 2019-07-08 RX ADMIN — MIDAZOLAM 1 MG: 1 INJECTION INTRAMUSCULAR; INTRAVENOUS at 12:50

## 2019-07-08 RX ADMIN — ACETAMINOPHEN 975 MG: 325 TABLET ORAL at 21:40

## 2019-07-08 RX ADMIN — DIPHENHYDRAMINE HYDROCHLORIDE 50 MG: 50 INJECTION, SOLUTION INTRAMUSCULAR; INTRAVENOUS at 12:28

## 2019-07-08 RX ADMIN — POTASSIUM CHLORIDE AND SODIUM CHLORIDE 125 ML/HR: 450; 150 INJECTION, SOLUTION INTRAVENOUS at 15:27

## 2019-07-08 RX ADMIN — OXYCODONE HYDROCHLORIDE 5 MG: 5 TABLET ORAL at 17:02

## 2019-07-08 RX ADMIN — DEXTROSE, SODIUM CHLORIDE, AND POTASSIUM CHLORIDE 125 ML/HR: 5; .45; .15 INJECTION INTRAVENOUS at 05:20

## 2019-07-08 RX ADMIN — VITAMIN D, TAB 1000IU (100/BT) 1000 UNITS: 25 TAB at 08:19

## 2019-07-08 RX ADMIN — VENLAFAXINE 75 MG: 37.5 TABLET ORAL at 17:03

## 2019-07-08 RX ADMIN — SENNOSIDES AND DOCUSATE SODIUM 1 TABLET: 8.6; 5 TABLET ORAL at 21:40

## 2019-07-08 RX ADMIN — FENTANYL CITRATE 50 MCG: 50 INJECTION, SOLUTION INTRAMUSCULAR; INTRAVENOUS at 12:57

## 2019-07-08 RX ADMIN — VENLAFAXINE 75 MG: 37.5 TABLET ORAL at 08:20

## 2019-07-08 RX ADMIN — MIDAZOLAM 1 MG: 1 INJECTION INTRAMUSCULAR; INTRAVENOUS at 12:25

## 2019-07-08 RX ADMIN — MIDAZOLAM 0.5 MG: 1 INJECTION INTRAMUSCULAR; INTRAVENOUS at 12:40

## 2019-07-08 RX ADMIN — LIDOCAINE 1 PATCH: 50 PATCH CUTANEOUS at 15:24

## 2019-07-08 RX ADMIN — FENTANYL CITRATE 50 MCG: 50 INJECTION, SOLUTION INTRAMUSCULAR; INTRAVENOUS at 12:25

## 2019-07-08 RX ADMIN — OXYBUTYNIN CHLORIDE 10 MG: 5 TABLET, EXTENDED RELEASE ORAL at 08:19

## 2019-07-08 RX ADMIN — POTASSIUM CHLORIDE AND SODIUM CHLORIDE 125 ML/HR: 450; 150 INJECTION, SOLUTION INTRAVENOUS at 23:40

## 2019-07-08 RX ADMIN — LISINOPRIL 5 MG: 5 TABLET ORAL at 08:18

## 2019-07-08 RX ADMIN — ARIPIPRAZOLE 10 MG: 10 TABLET ORAL at 08:21

## 2019-07-08 RX ADMIN — SULFAMETHOXAZOLE AND TRIMETHOPRIM 1 TABLET: 800; 160 TABLET ORAL at 08:21

## 2019-07-08 RX ADMIN — GEMFIBROZIL 600 MG: 600 TABLET ORAL at 08:21

## 2019-07-08 RX ADMIN — VENLAFAXINE 75 MG: 37.5 TABLET ORAL at 21:40

## 2019-07-08 RX ADMIN — SULFAMETHOXAZOLE AND TRIMETHOPRIM 1 TABLET: 800; 160 TABLET ORAL at 21:41

## 2019-07-08 RX ADMIN — HYDROMORPHONE HYDROCHLORIDE 0.2 MG: 1 INJECTION, SOLUTION INTRAMUSCULAR; INTRAVENOUS; SUBCUTANEOUS at 19:54

## 2019-07-08 RX ADMIN — DEXTROSE, SODIUM CHLORIDE, AND POTASSIUM CHLORIDE 125 ML/HR: 5; .45; .15 INJECTION INTRAVENOUS at 01:31

## 2019-07-08 RX ADMIN — ACETAMINOPHEN 975 MG: 325 TABLET ORAL at 15:25

## 2019-07-08 RX ADMIN — IOHEXOL 100 ML: 350 INJECTION, SOLUTION INTRAVENOUS at 13:16

## 2019-07-08 RX ADMIN — FUROSEMIDE 40 MG: 40 TABLET ORAL at 17:02

## 2019-07-08 RX ADMIN — POTASSIUM CHLORIDE AND SODIUM CHLORIDE 125 ML/HR: 450; 150 INJECTION, SOLUTION INTRAVENOUS at 08:22

## 2019-07-08 RX ADMIN — MIDAZOLAM 0.5 MG: 1 INJECTION INTRAMUSCULAR; INTRAVENOUS at 12:31

## 2019-07-08 NOTE — TELEPHONE ENCOUNTER
Called pt to see progress of her discomfort with her antibiotic use    She let us know she is in the hospital and has a pelvic mass

## 2019-07-08 NOTE — BRIEF OP NOTE (RAD/CATH)
CT guided pelvic adenopathy biopsy:  Procedure Note    PATIENT NAME: Tavares Caceres  : 1955  MRN: [id number]     Pre-op Diagnosis:   1  Acute deep vein thrombosis (DVT) of proximal vein of right lower extremity ([address] )    2  History of endometrial cancer    3  S/P hysterectomy      Post-op Diagnosis:   1  Acute deep vein thrombosis (DVT) of proximal vein of right lower extremity ([address] )    2  History of endometrial cancer    3  S/P hysterectomy        Surgeon:   Lannette Hashimoto, MD  Assistants:     No qualified resident was available, Resident is only observing    Estimated Blood Loss: minimal     Findings:     20 gauge cores, favor lesional tissue  3 passes obtained  Contrast given to ensure appropriate positioning       Specimens: 3 x 20 gauge core     Complications:  none    Anesthesia: Conscious sedation and Aaron Cockayne, MD     Date: 2019  Time: 1:10 PM

## 2019-07-08 NOTE — QUICK NOTE
Patient seen at bedside following IR biopsy of pelvic mass  She appears comfortable and in NAD, somewhat sedated but responsive to conversation  She denies any current pain  Has been tolerating PO since biopsy  Will follow up final pathology of biopsy  Awaiting final recommendations from Radiology Oncology  Appreciate palliative care assistance with pain regimen  Will reassess in evening and possible discharge this evening or tomorrow with outpatient follow up      Chandrakant Diamond MD  07/08/19

## 2019-07-08 NOTE — DISCHARGE INSTRUCTIONS
POST BIOPSY    Care after your procedure:    1  Limit your activities for 24 hours after your biopsy  2  No driving day of biopsy  3  Return to your normal diet  Small sips of flat soda will help with mild nausea  4  Remove band-aid or dressing 24 hours after procedure  Contact Interventional Radiology at [phone] Angelique PATIENTS: Contact Interventional Radiology at [phone]) Tuyet Johnson PATIENTS: Contact Interventional Radiology at [phone]) if:    1  Difficulty breathing, nausea or vomiting  2  Chills or fever above 101 degrees F      3  Pain at biopsy site not relieved by medication  4  Develop any redness, swelling, heat, unusual drainage, heavy bruising or bleeding from biopsy site

## 2019-07-08 NOTE — PROGRESS NOTES
Gyn Progress Note   Portia Melendez 66 y o  female MRN: [id number]  Unit/Bed#: [facility] 909-01 Encounter: [id number]    Assessment/Plan:  62-year-old female with stage IB endometrial cancer status post definitive surgical management, history of DVT and Xarelto with new right-sided pelvic mass and prominent lymph nodes noted on CT suspicious for metastatic disease  Improved pain this morning  Hospital day 2     1)  Stage IB endometrial cancer with new 5 cm soft tissue mass on the right pelvic sidewall concerning for metastatic disease  - s/p RATLH BSO PLND 12/2017  - Interventional Radiology consult for biopsy of lymph node/mass  - Xarelto held per IR recommendations (last dose 0600 7/7)  - Patient has been NPO since midnight  - Consult Radiation oncology given what appears to be metastatic disease  - Consult palliative for pain management- Will write for Dilaudid 0 2 for moderate and 0 5 for severe pain Q4 h at this time until she can be seen by palliative and she is s/p biopsy for PO pain management     2) Right lower extremity DVT  - Xarelto held for biopsy at this time  - Ambulation      3) HTN  - 100- 140/ 60-70s overnight  - Lasix 40mg BID  - home quinapril, linisopril as equivalent     4) HLD  - home gemfibrozil     5) DM  Metformin held for recent contrast  FS to assess need for ISS    6) GERD  - home omeprazole, protonix as equivalent     7) Depression  - home Effexor     8) FEN:   - NPO  - 1/2NS KCl 20mEq at 125mL/hr    9) DVT PPx:   - home xarelto held for possible IR procedure  - SCDs    Disposition: Continue inpatient management    Subjective:   Patient has no complaints this morning  No new events over night  She has intermittent right back and right lower pelvic and LE pain  No pain at time I am seeing her this morning  She denies fevers, chills, N/V, chest pain or shortness of breath  She has not had a BM "in days" but is passing flatus  She is voiding spontaneously without difficulty  She is ambulating  /78   Pulse 72   Temp 98 2 °F (36 8 °C)   Resp 20   Ht 4' 11" (1 499 m)   Wt 102 kg (224 lb 13 9 oz)   SpO2 98%   BMI 45 42 kg/m²     Lab Results   Component Value Date    WBC 13 58 (H) 07/08/2019    HGB 10 5 (L) 07/08/2019    HCT 33 3 (L) 07/08/2019    MCV 88 07/08/2019     (H) 07/08/2019       Lab Results   Component Value Date    GLUCOSE 219 (H) 12/19/2017    CALCIUM 9 5 07/08/2019     10/27/2017    K 3 7 07/08/2019    CO2 26 07/08/2019     07/08/2019    BUN 9 07/08/2019    CREATININE 0 65 07/08/2019       Lab Results   Component Value Date/Time    POCGLU 109 12/21/2017 10:30 AM    POCGLU 101 12/21/2017 07:02 AM    POCGLU 123 12/20/2017 09:09 PM    POCGLU 93 12/20/2017 04:08 PM    POCGLU 138 12/20/2017 11:22 AM       No intake/output data recorded  I/O this shift:  In: 1316 7 [I V :1316 7]  Out: 1850 [Urine:1850]    Physical Exam  Physical Exam   Constitutional: She is oriented to person, place, and time  She appears well-developed and well-nourished  No distress  HENT:   Head: Normocephalic and atraumatic  Neck: Normal range of motion  Neck supple  Cardiovascular: Normal rate, regular rhythm and normal heart sounds  Exam reveals no gallop and no friction rub  No murmur heard  Pulmonary/Chest: Effort normal and breath sounds normal  No respiratory distress  She has no wheezes  She has no rales  Abdominal: Soft  There is no tenderness  There is no rebound and no guarding  Musculoskeletal: She exhibits edema and tenderness  RLE appears erythematous with mild edema noted to thigh as compared to LLE   Neurological: She is alert and oriented to person, place, and time  Skin: Skin is warm and dry  She is not diaphoretic  Psychiatric: She has a normal mood and affect  Her behavior is normal  Judgment and thought content normal    Vitals reviewed        Sandra Jaramillo MD   Gyn PGY-3  7/8/2019 6:56 AM

## 2019-07-08 NOTE — CONSULTS
Consultation - [id number]h 66 y o  female MRN: [id number]  [facility] Encounter: [id number]      Assessment/Plan     Assessment:  66y o  year old female with a history of stage 1B endometrial CA s/p robot assisted total laparoscopic hysterectomy, bilateral salpingo-oopherectomy and pelvic lymph node dissection in December of 2017 presenting with right sided abdominal, back, groin and RLE pain x few weeks that is from newly diagnosed mass in the right side of the pelvis  Plan:  1  Pain mananagement   -Currently regimen is:     ~Tramadol 50mg PO daily at bedtime PRN moderate pain of which she received 1 dose 2139 on 7/7    ~Hydromorphone 0 2mg IV q4hrs PRN moderate pain of which she required 1 dose 0735 on 7/8    ~Hydromorphone 0 5mg IV q4hrs PRN severe pain which she has not needed      -Will change pain management regimen to:     ~Tylenol 975 mg p o  Every 8 hours scheduled    ~Lidoderm patch topical q12hrs [address] to the right lower back     ~Roxicodone 2 5mg q4hrs PRN moderate pain     ~Roxicodone 5mg q4hrs PRN severe pain    ~Dilaudid 0 2mg IV q4hrs PRN breakthrough pain   -Will start bowel regimen while on pain regimen     ~Senokot 1 tablet PO daily at bedtime    ~Miralax 17g PO daily PRN constipation     2  Nausea   ~Symptom only present s/p morphine administration once    ~Zofran 4mg IV q6hrs PRN   3  Depression:    ~Continue home medications: Abilify 10mg PO daily and Effexor 75mg PO TID    3  Goals:    -Patient would like to defer any goals of care discussions until pelvic mass biopsy resulted and treatment options discussed   -Meanwhile, patient would like to remain a Level 1, full code    -Patient lives with her 80year old mother who she helps care for    -Has 2 siblings who she would like as decision makers for her if she were unable to make decision for herself, but needs to speak with the siblings to see who would be listed as primary/ first phone call   At this time, would like sister to be the first phone call    Cuate Ansari (sister)    Ayanna Calixto (brother)   -Discussed [employer] W[employer], gave patient the paperwork to browse and will re-address when ready    History of Present Illness   Physician Requesting Consult: Aristides Servin MD  Reason for Consult / Principal Problem: pain management/ symptom control and goals of care discussion  Hx and PE limited by: no limitations  HPI: Eri John is a 66y o  year old female with a history of stage 1B endometrial CA s/p robot assisted total laparoscopic hysterectomy, bilateral salpingo-oopherectomy and pelvic lymph node dissection in December of 2017 presenting with right sided abdominal, back, groin and RLE pain x few weeks that is from newly diagnosed mass in the right side of the pelvis  Per Gyn/Onc, new mass is in an uncommon pattern of recurrence for comprehensively stage disease and therefore plan is to obtain percutaneous biopsy and consult radiation oncology  Inpatient consult to Palliative Care     Date/Time 7/8/2019 9:40 AM     Performed by  Elon Merlin, DO     Authorized by Sarah Ching MD              Review of Systems   Constitutional: Negative for chills and fever  HENT: Negative for congestion and rhinorrhea  Eyes: Negative for redness and visual disturbance  Respiratory: Negative for cough and shortness of breath  Cardiovascular: Negative for chest pain and palpitations  Gastrointestinal: Positive for abdominal pain  Negative for blood in stool, constipation, diarrhea, nausea and vomiting  Genitourinary: Positive for flank pain  Negative for dysuria and urgency  Musculoskeletal: Positive for back pain  Negative for neck pain  Skin: Negative for pallor and rash  Neurological: Negative for dizziness, light-headedness and headaches         Historical Information   Past Medical History:   Diagnosis Date    Anemia     Depression     [facility] [address] )     [facility]    Hyperglycemia     vx type 2 dm -- last assessed 4/1/14; resolved 11/7/17    Hyperlipidemia     Hypertension     Obesity     last assessed 10/14/17; resolved 11/7/17     Past Surgical History:   Procedure Laterality Date    ABDOMINAL SURGERY      GASTRIC BYPASS    CHOLECYSTECTOMY      at the time of gastric bypass    COLONOSCOPY      GASTRIC BYPASS      HYSTERECTOMY Bilateral     total abdominal with salpingo-oophorectomy    OOPHORECTOMY Bilateral     SC LAP, RADICAL HYST W/ TUBE&OV, NODE BX N/A 12/19/2017    Procedure: HYSTERECTOMY LAPAROSCOPIC TOTAL ([address]) W/ ROBOTICS; BILATERAL SALPINGOOPHERECTOMY; LYMPH NODE DISSECTION; lysis of adhesions;  Surgeon: Kait Feliciano MD;  [facility];  Service: Gynecology Oncology    SC LAP,DIAGNOSTIC ABDOMEN N/A 12/19/2017    Procedure: LAPAROSCOPY DIAGNOSTIC;  Surgeon: Kait Feliciano MD;  [facility];  Service: Gynecology Oncology    TONSILLECTOMY      US GUIDED BREAST BIOPSY RIGHT COMPLETE Right 6/28/2019     Social History     Socioeconomic History    Marital status: Single     Spouse name: None    Number of children: None    Years of education: None    Highest education level: None   Occupational History    None   Social Needs    Financial resource strain: None    Food insecurity:     Worry: None     Inability: None    Transportation needs:     Medical: None     Non-medical: None   Tobacco Use    Smoking status: Never Smoker    Smokeless tobacco: Never Used   Substance and Sexual Activity    Alcohol use: Not Currently    Drug use: No    Sexual activity: Not Currently   Lifestyle    Physical activity:     Days per week: None     Minutes per session: None    Stress: None   Relationships    Social connections:     Talks on phone: None     Gets together: None     Attends Temple service: None     Active member of club or organization: None     Attends meetings of clubs or organizations: None     Relationship status: None    Intimate partner violence:     Fear of current or ex partner: None     Emotionally abused: None     Physically abused: None     Forced sexual activity: None   Other Topics Concern    None   Social History Narrative    None     Family History   Problem Relation Age of Onset    Other Mother         dyslipidemia    Ovarian cancer Mother 48    Lymphoma Father     Bone cancer Maternal Grandfather     No Known Problems Sister     No Known Problems Maternal Grandmother     No Known Problems Paternal Grandmother     No Known Problems Paternal Grandfather     No Known Problems Maternal Aunt     No Known Problems Maternal Aunt     No Known Problems Maternal Aunt     No Known Problems Maternal Aunt     No Known Problems Paternal Aunt     No Known Problems Paternal Aunt     No Known Problems Paternal Aunt     No Known Problems Paternal Aunt     No Known Problems Brother     No Known Problems Brother        Meds/Allergies   current meds:   Current Facility-Administered Medications   Medication Dose Route Frequency    acetaminophen (TYLENOL) tablet 975 mg  975 mg Oral Q8H [address]    ARIPiprazole (ABILIFY) tablet 10 mg  10 mg Oral Daily    cholecalciferol (VITAMIN D3) tablet 1,000 Units  1,000 Units Oral Daily    furosemide (LASIX) tablet 40 mg  40 mg Oral BID    gemfibrozil (LOPID) tablet 600 mg  600 mg Oral Daily    HYDROmorphone (DILAUDID) injection 0 2 mg  0 2 mg Intravenous Q4H PRN    insulin lispro (HumaLOG) 100 units/mL subcutaneous injection 1-6 Units  1-6 Units Subcutaneous TID AC    lidocaine (LIDODERM) 5 % patch 1 patch  1 patch Topical Daily    lisinopril (ZESTRIL) tablet 5 mg  5 mg Oral Daily    ondansetron (ZOFRAN) injection 4 mg  4 mg Intravenous Q6H PRN    oxybutynin (DITROPAN-XL) 24 hr tablet 10 mg  10 mg Oral Daily    oxyCODONE (ROXICODONE) IR tablet 2 5 mg  2 5 mg Oral Q4H PRN    oxyCODONE (ROXICODONE) IR tablet 5 mg  5 mg Oral Q4H PRN    pantoprazole (PROTONIX) EC tablet 40 mg  40 mg Oral Early Morning    polyethylene glycol (MIRALAX) packet 17 g  17 g Oral Daily PRN    senna-docusate sodium (SENOKOT S) 8 6-50 mg per tablet 1 tablet  1 tablet Oral HS    sodium chloride 0 45 % with KCl 20 mEq/L infusion (premix)  125 mL/hr Intravenous Continuous    sulfamethoxazole-trimethoprim (BACTRIM DS) 800-160 mg per tablet 1 tablet  1 tablet Oral Q12H [facility]    venlafaxine (EFFEXOR) tablet 75 mg  75 mg Oral TID    and PTA meds:   Prior to Admission Medications   Prescriptions Last Dose Informant Patient Reported? Taking?    ARIPiprazole (ABILIFY) 10 mg tablet 7/7/2019 at Unknown time Self Yes Yes   Sig: Take 10 mg by mouth daily   Ferrous Sulfate (IRON) 28 MG TABS 7/7/2019 at Unknown time Self Yes Yes   Sig: Take by mouth   MYRBETRIQ 50 MG TB24 7/7/2019 at Unknown time Self No Yes   Sig: TAKE 1 TABLET DAILY   aspirin (ECOTRIN LOW STRENGTH) 81 mg EC tablet 7/6/2019 at Unknown time Self Yes Yes   Sig: Take 81 mg by mouth daily   cholecalciferol (VITAMIN D3) 1,000 units tablet 7/7/2019 at Unknown time Self Yes Yes   Sig: Take 1,000 Units by mouth daily   dexmethylphenidate (FOCALIN XR) 10 MG 24 hr capsule 7/7/2019 at Unknown time Self Yes Yes   Sig: Take 10 mg by mouth daily   furosemide (LASIX) 40 mg tablet Past Week at Unknown time Self No Yes   Sig: Take 1 tablet (40 mg total) by mouth 2 (two) times a day   gemfibrozil (LOPID) 600 mg tablet 7/6/2019 at Unknown time Self No Yes   Sig: Take 1 tablet (600 mg total) by mouth daily   metFORMIN (GLUCOPHAGE) 500 mg tablet 7/6/2019 at Unknown time Self No Yes   Sig: Take 1 tablet (500 mg total) by mouth daily   omeprazole (PriLOSEC) 20 mg delayed release capsule  Self Yes Yes   Sig: Take 20 mg by mouth daily   oxybutynin (DITROPAN-XL) 10 MG 24 hr tablet 7/7/2019 at Unknown time Self No Yes   Sig: TAKE 1 TABLET BY MOUTH EVERY DAY   potassium chloride (K-DUR,KLOR-CON) 20 mEq tablet Past Week at Unknown time Self No Yes   Sig: Take 1 tablet (20 mEq total) by mouth 2 (two) times a day   quinapril (ACCUPRIL) 5 mg tablet 7/7/2019 at Unknown time Self No Yes   Sig: TAKE 1 TABLET BY MOUTH DAILY AT BEDTIME   rivaroxaban (XARELTO) 15 & 20 MG starter pack Unknown at Unknown time Self No No   Sig: 15 mg bid x 21 days then 20 mg daily   rivaroxaban (XARELTO) 20 mg tablet 7/7/2019 at Unknown time Self No Yes   Sig: Take 1 tablet (20 mg total) by mouth daily with breakfast   sulfamethoxazole-trimethoprim (BACTRIM DS) 800-160 mg per tablet 7/7/2019 at Unknown time  No Yes   Sig: Take 1 tablet by mouth every 12 (twelve) hours for 7 days   traMADol (ULTRAM) 50 mg tablet Unknown at Unknown time Self No No   Sig: Take 1 tablet (50 mg total) by mouth daily at bedtime as needed for moderate pain   venlafaxine (EFFEXOR) 75 mg tablet 7/7/2019 at Unknown time Self Yes Yes   Sig: Take 75 mg by mouth 3 (three) times a day        Facility-Administered Medications: None         Allergies   Allergen Reactions    Cephalosporins Other (See Comments)     RASH - BILATERAL ARMS PER RN       Objective     Physical Exam   Constitutional: She is oriented to person, place, and time  She appears well-developed and well-nourished  No distress  HENT:   Head: Normocephalic and atraumatic  Mouth/Throat: Oropharynx is clear and moist  No oropharyngeal exudate  Eyes: Pupils are equal, round, and reactive to light  Conjunctivae and EOM are normal    Neck: Normal range of motion  Neck supple  Cardiovascular: Normal rate, regular rhythm, normal heart sounds and intact distal pulses  Exam reveals no gallop and no friction rub  No murmur heard  Pulmonary/Chest: Effort normal and breath sounds normal  No stridor  No respiratory distress  She has no wheezes  She has no rales  She exhibits no tenderness  Abdominal: Soft  Bowel sounds are normal  She exhibits no distension  There is tenderness (tender in the RLQ, right flank and right groin)  There is no rebound and no guarding     Musculoskeletal: Normal range of motion  She exhibits tenderness (tender over the right paraspinal lumbar musculature)  Neurological: She is alert and oriented to person, place, and time  Skin: Skin is warm and dry  She is not diaphoretic  Psychiatric: She has a normal mood and affect  Her behavior is normal    Nursing note and vitals reviewed  Lab Results:   I have personally reviewed pertinent labs  , CBC:   Lab Results   Component Value Date    WBC 13 58 (H) 07/08/2019    HGB 10 5 (L) 07/08/2019    HCT 33 3 (L) 07/08/2019    MCV 88 07/08/2019     (H) 07/08/2019    MCH 27 9 07/08/2019    MCHC 31 5 07/08/2019    RDW 14 2 07/08/2019    MPV 9 9 07/08/2019    NRBC 0 07/08/2019   , CMP:   Lab Results   Component Value Date    SODIUM 135 (L) 07/08/2019    K 3 7 07/08/2019     07/08/2019    CO2 26 07/08/2019    BUN 9 07/08/2019    CREATININE 0 65 07/08/2019    CALCIUM 9 5 07/08/2019    AST 31 07/08/2019    ALT 63 07/08/2019    ALKPHOS 134 (H) 07/08/2019    EGFR 96 07/08/2019     Imaging Studies: I have personally reviewed pertinent reports  07/07/2019 CT Chest/Abdomen/Pelvis:   1   5 cm right-sided pelvic mass, encasing the right femoral artery and vein as well as the right ureter  This is likely the cause of the patient's right lower extremity DVT  The mass is resulting in moderate right-sided hydroureteronephrosis, up to    the level of the pelvic inlet  The patient has a remote history of endometrial carcinoma  There are prominent lymph nodes along the right internal and external iliac deb chains  Correlate for recurrent disease/metastatic disease  Biopsy    recommended  2   Moderate right-sided hydroureteronephrosis, up to the level of the pelvic mass  There is severe stenosis of the ureter at the level of the soft tissue mass  Distally, the right ureter is visualized, however diminutive in size     3   Unremarkable enhanced CT scan of the chest        EKG, Pathology, and Other Studies: I have personally reviewed pertinent reports  Code Status: Level 1 - Full Code  Advance Directive and Living Will:      Power of :    POLST:      Counseling / Coordination of Care  Total floor / unit time spent today 55 minutes  Greater than 50% of total time was spent with the patient and / or family counseling and / or coordination of care  A description of the counseling / coordination of care: pain medication changes, goals of care discussion, family discussion

## 2019-07-08 NOTE — UTILIZATION REVIEW
Initial Clinical Review    Admission: Date/Time/Statement: 7/7/19 @ 1810  Orders Placed This Encounter   Procedures    Inpatient Admission     Standing Status:   Standing     Number of Occurrences:   1     Order Specific Question:   Admitting Physician     Answer:   David Steiner [[id number]]     Order Specific Question:   Level of Care     Answer:   Med Surg [16]     Order Specific Question:   Estimated length of stay     Answer:   More than 2 Midnights     Order Specific Question:   Certification     Answer:   I certify that inpatient services are medically necessary for this patient for a duration of greater than two midnights  See H&P and MD Progress Notes for additional information about the patient's course of treatment  Assessment/Plan: 66year old female, presented to the ED @ [facility] (She states that she was scheduled to go for a CT chest/abdomen/pelvis for follow-up of her stage IB endometrial cancer but reports that she was experiencing significant pain at home, therefore decided to go into [facility] for further evaluation ), transferred to [facility] via EMS with c/o right sided abd/pelvic pain and right leg pain, started about 1 month ago, gradually worsening  CT abdomen/chest/pelvis with contrast was performed on 07/07/2019 showing a 5 cm right-sided pelvic mass, encasing the right femoral artery and vein as well as the right ureter-likely the cause of the patient's right lower extremity DVT  The mass is resulting in moderate right-sided hydronephrosis up to the level of the pelvic inlet  There are prominent lymph nodes along the right internal and external iliac node chains, recommended correlation for recurrent disease/metastatic disease  Due to suspicion of potential disease progression and need for gyn Oncology evaluation, patient was transferred to [facility] for further management     Triage Vitals   Temperature Pulse Respirations Blood Pressure SpO2   07/07/19 1700 07/07/19 1700 07/07/19 1700 07/07/19 1700 07/07/19 2155   98 4 °F (36 9 °C) 78 18 105/68 98 %      Temp Source Heart Rate Source Patient Position - Orthostatic VS BP Location FiO2 (%)   07/07/19 1700 -- -- -- --   Oral          Pain Score       07/07/19 1730       4        Wt Readings from Last 1 Encounters:   07/07/19 102 kg (224 lb 13 9 oz)     Additional Vital Signs:   Date/Time  Temp  Pulse  Resp  BP  SpO2  O2 Device   07/08/19 1345    85    127/73  97 %     07/08/19 1310    94  18  153/66  99 %  None (Room air)   07/08/19 1305    95  18  142/63  99 %  Nasal cannula   07/08/19 1300    94  18  132/63  100 %  Nasal cannula   07/08/19 1255    86  18  141/65  98 %  Nasal cannula   07/08/19 1250    87  18  139/67  99 %  Nasal cannula   07/08/19 1245    85  18  123/60  99 %  Nasal cannula   07/08/19 1240    87  18  144/65  99 %  Nasal cannula   07/08/19 1235    86  18  156/69  98 %  Nasal cannula   07/08/19 1230    81  18  149/65  98 %  Nasal cannula   07/08/19 1225    71  18  151/57  100 %  Nasal cannula   07/08/19 1220    75  18  161/71  100 %  Nasal cannula   07/08/19 0720  98 2 °F (36 8 °C)  80    142/74  95 %         Pertinent Labs/Diagnostic Test Results:   Results from last 7 days   Lab Units 07/08/19  0515 07/07/19  0938   WBC Thousand/uL 13 58* 11 61*   HEMOGLOBIN g/dL 10 5* 10 5*   HEMATOCRIT % 33 3* 32 5*   PLATELETS Thousands/uL 493* 458*   NEUTROS ABS Thousands/µL 10 21* 8 54*     Results from last 7 days   Lab Units 07/08/19  0515 07/07/19  0938   SODIUM mmol/L 135* 138   POTASSIUM mmol/L 3 7 4 0   CHLORIDE mmol/L 102 103   CO2 mmol/L 26 22   ANION GAP mmol/L 7 13   BUN mg/dL 9 11   CREATININE mg/dL 0 65 0 71   EGFR ml/min/1 73sq m 96 92   CALCIUM mg/dL 9 5 9 5   MAGNESIUM mg/dL 1 9 1 8   PHOSPHORUS mg/dL 3 2  --      Results from last 7 days   Lab Units 07/08/19  0515 07/07/19  0938   AST U/L 31 13   ALT U/L 63 19   ALK PHOS U/L 134* 93   TOTAL PROTEIN g/dL 8 4* 7 9   ALBUMIN g/dL 3 7 3 6   TOTAL BILIRUBIN mg/dL 0 27 0 20     Results from last 7 days   Lab Units 07/08/19  1128   POC GLUCOSE mg/dl 128     Results from last 7 days   Lab Units 07/08/19  0515 07/07/19  0938   GLUCOSE RANDOM mg/dL 131 115     Results from last 7 days   Lab Units 07/08/19  0515 07/07/19  0938   PROTIME seconds 14 3 20 9*   INR  1 15 1 89*   PTT seconds 39* 53*     Results from last 7 days   Lab Units 07/07/19  0938   CRP mg/L 34 1*     07/07/2019 @ 1035  CT abd/pel:  1   5 cm right-sided pelvic mass, encasing the right femoral artery and vein as well as the right ureter   This is likely the cause of the patient's right lower extremity DVT   The mass is resulting in moderate right-sided hydroureteronephrosis, up to the level of the pelvic inlet   The patient has a remote history of endometrial carcinoma   There are prominent lymph nodes along the right internal and external iliac deb chains   Correlate for recurrent disease/metastatic disease   Biopsy recommended  2   Moderate right-sided hydroureteronephrosis, up to the level of the pelvic mass  Joy Cola is severe stenosis of the ureter at the level of the soft tissue mass   Distally, the right ureter is visualized, however diminutive in size    3   Unremarkable enhanced CT scan of the chest     Past Medical History:   Diagnosis Date    Anemia     Depression     [facility] mellitus [facility] [address] )     [facility]omet[facility] cancer ([facility] [address] ) 12/2017    Hyperglycemia     vx type 2 dm -- last assessed 4/1/14; resolved 11/7/17    Hyperlipidemia     Hypertension     Obesity     last assessed 10/14/17; resolved 11/7/17     Present on Admission:   Acute deep vein thrombosis (DVT) of proximal vein of lower extremity (HCC)   Benign essential hypertension   Depression   Dyslipidemia    Admitting Diagnosis: Lower leg pain [M79 [id number]]  Age/Sex: 66 y o  female  Admission Orders:  Current Facility-Administered Medications:  acetaminophen 975 mg Oral Q8H [address]   ARIPiprazole 10 mg Oral Daily cholecalciferol 1,000 Units Oral Daily   furosemide 40 mg Oral BID   gemfibrozil 600 mg Oral Daily   HYDROmorphone 0 2 mg Intravenous Q4H PRN   insulin lispro 1-6 Units Subcutaneous TID AC   lidocaine 1 patch Topical Daily   lisinopril 5 mg Oral Daily   ondansetron 4 mg Intravenous Q6H PRN   oxybutynin 10 mg Oral Daily   oxyCODONE 2 5 mg Oral Q4H PRN   oxyCODONE 5 mg Oral Q4H PRN   pantoprazole 40 mg Oral Early Morning   polyethylene glycol 17 g Oral Daily PRN   senna-docusate sodium 1 tablet Oral HS   sodium chloride 0 45 % with KCl 20 mEq/L 125 mL/hr Intravenous Continuous   sulfamethoxazole-trimethoprim 1 tablet Oral Q12H [facility]   venlafaxine 75 mg Oral TID     CT needle bx lymph node: pending  IP CONSULT TO PALLIATIVE CARE:  Pain Mgmt  IP CONSULT TO RADIATION ONCOLOGY  [facility]    Network Utilization Review Department  Phone: [phone]; Fax [phone]  [email or web address]  ATTENTION: Please call with any questions or concerns to [phone]  and carefully listen to the prompts so that you are directed to the right person  Send all requests for admission clinical reviews, approved or denied determinations and any other requests to fax [phone]   All voicemails are confidential

## 2019-07-08 NOTE — SOCIAL WORK
Met with pt and sister and explained CM program/CM role  Resides with mother in a house, [address], bed/bathroom 2nd floor  PLOF for ADL's and ambulation - independent  She drives  PCP Dr Lupe Plummer  Has prescription plan, uses [facility]  [facility]- denies utilization  History depression followed OP by PCP, denies IP care  Denies drug and/or alcohol abuse  Primary contact is sister Lashaun Agosto, [phone]  No POA/Living Will  Family will provide transport home    CM reviewed d/c planning process including the following: identifying help at home, patient preference for d/c planning needs, Discharge Lounge, [facility] Meds to Bed program, availability of treatment team to discuss questions or concerns patient and/or family may have regarding understanding medications and recognizing signs and symptoms once discharged  CM also encouraged patient to follow up with all recommended appointments after discharge  Patient advised of importance for patient and family to participate in managing patients medical well being  Patient/caregiver received discharge checklist  Content reviewed  Patient/caregiver encouraged to participate in discharge plan of care prior to discharge home

## 2019-07-08 NOTE — DISCHARGE SUMMARY
Discharge Summary - Gynecologic Oncology  Ronit Moreno 66 y o  female MRN: [id number]  Unit/Bed#: [facility] 909-01 Encounter: [id number]    Admission Date: 7/7/2019   Discharge Date: 07/09/19    Attending Physician: Gabriel Goode MD    Consulting Physician(s): Interventional radiology- Dr Mio Moraes, 7/8/2019  Radiology oncology- Dr Ted Sanches, 7/8/2019  Palliative care- Dr Shirlene Yepez, 7/8/2019    Admitting Diagnosis:   Lower leg pain [M79 [id number]]  Pelvic mass    Discharge Diagnosis:   As above    Procedures Performed:   IR biopsy of mass 7/8/2019    Hospital Course: Ms Kyle Rowley is a 46-year-old female with history of stage IB endometrial carcinoma status post RA-TLH and BSO with staging in 12/2017 who presented with increasing leg pain and lower abdominal pain- on CT scan there was noted to be a new right-sided pelvic mass concerning for recurrence of endometrial cancer  She was admitted for pain control and and biopsy of new onset pelvic mass  She was seen by palliative care for recommendations for pain control  She was taken for biopsy of pelvic mass on 7/9/2019  She is to follow up outpatient with radiology oncology and gyn Oncology following final pathology in 1 week  On day of discharge her pain was well controlled on Tylenol and oxycodone for which she had Rx she was discharged with follow-up with palliative Care in 1 week      Lab Results:   Lab Results   Component Value Date    WBC 12 90 (H) 07/09/2019    HGB 11 0 (L) 07/09/2019    HCT 34 9 07/09/2019    MCV 88 07/09/2019     (H) 07/09/2019     Lab Results   Component Value Date    GLUCOSE 219 (H) 12/19/2017    CALCIUM 9 4 07/09/2019     10/27/2017    K 4 1 07/09/2019    CO2 28 07/09/2019     07/09/2019    BUN 12 07/09/2019    CREATININE 0 77 07/09/2019     Lab Results   Component Value Date/Time    POCGLU 100 07/08/2019 08:57 PM    POCGLU 141 (H) 07/08/2019 04:46 PM    POCGLU 128 07/08/2019 11:28 AM    POCGLU 109 12/21/2017 10:30 AM POCGLU 101 12/21/2017 07:02 AM     Lab Results   Component Value Date    PTT 39 (H) 07/08/2019     Lab Results   Component Value Date    INR 1 15 07/08/2019    INR 1 89 (H) 07/07/2019    INR 1 14 12/19/2017    PROTIME 14 3 07/08/2019    PROTIME 20 9 (H) 07/07/2019    PROTIME 14 6 (H) 12/19/2017         Condition at Discharge: good     Discharge Medications: See after visit summary for reconciled discharge medications provided to patient and family  Discharge instructions/Information to patient and family: See after visit summary for information provided to patient and family  Provisions for Follow-Up Care: See after visit summary for information related to follow-up care and any pertinent home health orders  Disposition: Home    Planned Readmission: No    Code Status: Full-code    Discharge Statement   I spent 15 minutes discharging the patient  This time was spent on the day of discharge  I had direct contact with the patient on the day of discharge  Additional documentation is required if more than 30 minutes were spent on discharge       Tessie Mondragon MD  07/09/19

## 2019-07-08 NOTE — CONSULTS
Consultation - Radiation Oncology   Shimelony Melendez 66 y o  female MRN: [id number]  Unit/Bed#: [facility] 909-01 Encounter: [id number]        History of Present Illness   Physician Requesting Consult: Mahad Traore MD  Reason for Consult / Principal Problem: Right pelvic mass  Consults      HPI: Dina Elder is a 66y o  year old female who presents with a history of stage IB, grade 1 endometrial adenocarcinoma status post robotic assisted total laparoscopic salpingo-oophorectomy and sentinel bilateral pelvic lymph node dissection on 12/19/2017  She received no adjuvant therapy as she had low risk disease and did well until May 2019 when she was found with acute DVT of the right lower extremity and was placed on Xarelto  DVT prompted work-up to rule out oncology primary initiated by Gyn Onc  Work-up included mammogram and right breast biopsy on 6/28/2019 that was benign  She presented to ED on 7/7/2019 with several weeks of intermittent right pelvic and lower extremity pain  Pain was episodic in nature  She was scheduled to go for a CT chest/abdomen/pelvis as part of her oncology work-up, but presented for evaluation due to severity of pain  Pain was associated with nausea      CT abdomen/chest/pelvis on 07/07/2019 showed a 5 cm right-sided pelvic mass, encasing the right femoral artery and vein as well as the right ureter resulting in moderate right-sided hydronephrosis  Prominent lymph nodes along the right internal and external iliac node chains noted concerning for metastatic disease      Patient was admitted for work-up  Pain now well-controlled on roxicodone and dilaudid  Suspected that large pelvic mass is etiology of her pain  Palliative care consulted  Urology has seen patient and nephrostomy stent deferred at this time  Biopsy of the pelvic mass was done today  Currently, the patient denies pain    She denies weight loss, other areas of pain, nausea, vomiting, constipation, shortness of breath, cough, headaches, visual changes, or focal numbness or weakness  She continues to suffer from right leg edema, which is stable        Historical Information   Previous Oncology History: none  Previous Radiation History: none    Past Medical History:   Diagnosis Date    Anemia     Depression     Diabetes mellitus ([facility] [address] )     Endometrial cancer ([facility] [address] ) 12/2017    Hyperglycemia     vx type 2 dm -- last assessed 4/1/14; resolved 11/7/17    Hyperlipidemia     Hypertension     Obesity     last assessed 10/14/17; resolved 11/7/17     Past Surgical History:   Procedure Laterality Date    ABDOMINAL SURGERY      GASTRIC BYPASS    CHOLECYSTECTOMY      at the time of gastric bypass    COLONOSCOPY      CT NEEDLE BIOPSY LYMPH NODE  7/8/2019    GASTRIC BYPASS      HYSTERECTOMY Bilateral     total abdominal with salpingo-oophorectomy    OOPHORECTOMY Bilateral     VT LAP, RADICAL HYST W/ TUBE&OV, NODE BX N/A 12/19/2017    Procedure: HYSTERECTOMY LAPAROSCOPIC TOTAL ([address]) W/ ROBOTICS; BILATERAL SALPINGOOPHERECTOMY; LYMPH NODE DISSECTION; lysis of adhesions;  Surgeon: Matthias Richardson MD;  [facility]: [facility];  Service: Gynecology Oncology    VT LAP,DIAGNOSTIC ABDOMEN N/A 12/19/2017    Procedure: LAPAROSCOPY DIAGNOSTIC;  Surgeon: Matthias Richardson MD;  [facility];  Service: Gynecology Oncology    TONSILLECTOMY      US GUIDED BREAST BIOPSY RIGHT COMPLETE Right 6/28/2019       Family History   Problem Relation Age of Onset    Other Mother         dyslipidemia    Ovarian cancer Mother 48    Lymphoma Father     Bone cancer Maternal Grandfather     No Known Problems Sister     No Known Problems Maternal Grandmother     No Known Problems Paternal Grandmother     No Known Problems Paternal Grandfather     No Known Problems Maternal Aunt     No Known Problems Maternal Aunt     No Known Problems Maternal Aunt     No Known Problems Maternal Aunt     No Known Problems Paternal Aunt     No Known Problems Paternal Aunt     No Known Problems Paternal Aunt     No Known Problems Paternal Aunt     No Known Problems Brother     No Known Problems Brother      Social History   Social History     Substance and Sexual Activity   Alcohol Use Not Currently     Social History     Substance and Sexual Activity   Drug Use No     Social History     Tobacco Use   Smoking Status Never Smoker   Smokeless Tobacco Never Used       Meds/Allergies   current meds:   Current Facility-Administered Medications   Medication Dose Route Frequency    acetaminophen (TYLENOL) tablet 975 mg  975 mg Oral Q8H [address]    ARIPiprazole (ABILIFY) tablet 10 mg  10 mg Oral Daily    cholecalciferol (VITAMIN D3) tablet 1,000 Units  1,000 Units Oral Daily    furosemide (LASIX) tablet 40 mg  40 mg Oral BID    gemfibrozil (LOPID) tablet 600 mg  600 mg Oral Daily    HYDROmorphone (DILAUDID) injection 0 2 mg  0 2 mg Intravenous Q4H PRN    insulin lispro (HumaLOG) 100 units/mL subcutaneous injection 1-6 Units  1-6 Units Subcutaneous TID AC    lidocaine (LIDODERM) 5 % patch 1 patch  1 patch Topical Daily    lisinopril (ZESTRIL) tablet 5 mg  5 mg Oral Daily    ondansetron (ZOFRAN) injection 4 mg  4 mg Intravenous Q6H PRN    oxybutynin (DITROPAN-XL) 24 hr tablet 10 mg  10 mg Oral Daily    oxyCODONE (ROXICODONE) IR tablet 2 5 mg  2 5 mg Oral Q4H PRN    oxyCODONE (ROXICODONE) IR tablet 5 mg  5 mg Oral Q4H PRN    pantoprazole (PROTONIX) EC tablet 40 mg  40 mg Oral Early Morning    polyethylene glycol (MIRALAX) packet 17 g  17 g Oral Daily PRN    senna-docusate sodium (SENOKOT S) 8 6-50 mg per tablet 1 tablet  1 tablet Oral HS    sodium chloride 0 45 % with KCl 20 mEq/L infusion (premix)  125 mL/hr Intravenous Continuous    sulfamethoxazole-trimethoprim (BACTRIM DS) 800-160 mg per tablet 1 tablet  1 tablet Oral Q12H [place]    venlafaxine (EFFEXOR) tablet 75 mg  75 mg Oral TID       Allergies   Allergen Reactions    Cephalosporins Other (See Comments)     RASH - BILATERAL ARMS PER RN       Review of Systems   Constitutional: Negative for appetite change, fever and unexpected weight change  HENT: Negative for sore throat and trouble swallowing  Eyes: Negative for visual disturbance  Respiratory: Negative  Negative for wheezing  Cardiovascular: Positive for leg swelling (RLE as per HPI)  Negative for chest pain and palpitations  Gastrointestinal: Positive for nausea (as per HPI)  Negative for abdominal pain, blood in stool and vomiting  Genitourinary: Positive for pelvic pain (as per HPI)  Negative for dysuria, hematuria and vaginal bleeding  Musculoskeletal: Negative for back pain and neck pain  Neurological: Negative  Hematological: Negative  Objective   Physical Exam   Constitutional: She is oriented to person, place, and time  She appears well-developed and well-nourished  No distress  Eyes: No scleral icterus  Cardiovascular: Normal rate, regular rhythm and normal heart sounds  No murmur heard  Pulmonary/Chest: Effort normal and breath sounds normal  She has no wheezes  She has no rales  Anterior fields auscultated   Abdominal: Soft  She exhibits no distension  There is no tenderness  There is no rebound and no guarding  Musculoskeletal: She exhibits edema (2+ non-pitting edema RLE, some erythema right thigh edema)  She exhibits no tenderness  Lymphadenopathy:     She has no cervical adenopathy  Neurological: She is alert and oriented to person, place, and time  No cranial nerve deficit  Skin: Skin is warm and dry  Capillary refill takes less than 2 seconds  Vitals reviewed  Lab Results: I have personally reviewed pertinent reports  Imaging Studies: I have personally reviewed pertinent films in PACS  Pathology: I have personally reviewed pertinent reports          Assessment/Plan      Suzan Marquez is a 66y o  year old female with past medical history of low risk stage IB, grade 1 endometrial adenocarcinoma status post resection on 12/19/2017  She has now been found with 5cm right pelvic mass associated right lower extremity edema, pain, DVT (on Xarelto) and moderate right hydronephrosis  Biopsy has been done and is pending  Malignancy is suspected, possible recurrent endometrial versus new primary  CT imaging demonstrates no other definite areas of disease  We will follow-up on pathology  If pathology consistent with malignancy, then I recommend she undergo palliative radiation at the minimum in an effort to reduce symptoms from the right pelvic mass  We would plan a dose of 30-37 5Gy in 10-15 fractions depending on normal tissue dose and other plans for therapy  If primary is GI or GYN, then radiation may be used in a more definitive setting  We would plan pelvic radiation to a total dose of 45G with boost to primary tumor to 50-59 4Gy depending on primary and normal tissue tolerance in an effort to provide local control  I explained these options to the patient and that final recommendations would depend on: 1) if mass represents malignancy, 2) what primary of malignancy represents, 3) final staging which will be completed as outpatient when primary is determined, 4) other modalities of treatment that may be offered, and 5) her desires  We briefly discussed possible goals of radiation and possible side effects and risks of radiation  This will be discussed in detail when final recommendations provided  We will follow-up as outpatient at [facility] to coordinate further care  The patient was given the opportunity to ask questions that were answered to her satisfaction  She agreed with the management plan  At time of discharge, the patient should make follow-up appointment with Radiation Oncology department: [phone]

## 2019-07-08 NOTE — PLAN OF CARE
Problem: PAIN - ADULT  Goal: Verbalizes/displays adequate comfort level or baseline comfort level  Description  Interventions:  - Encourage patient to monitor pain and request assistance  - Assess pain using appropriate pain scale  - Administer analgesics based on type and severity of pain and evaluate response  - Implement non-pharmacological measures as appropriate and evaluate response  - Consider cultural and social influences on pain and pain management  - Notify physician/advanced practitioner if interventions unsuccessful or patient reports new pain  Outcome: Progressing     Problem: INFECTION - ADULT  Goal: Absence or prevention of progression during hospitalization  Description  INTERVENTIONS:  - Assess and monitor for signs and symptoms of infection  - Monitor lab/diagnostic results  - Monitor all insertion sites, i e  indwelling lines, tubes, and drains  - Monitor endotracheal (as able) and nasal secretions for changes in amount and color  - [place] appropriate cooling/warming therapies per order  - Administer medications as ordered  - Instruct and encourage patient and family to use good hand hygiene technique  - Identify and instruct in appropriate isolation precautions for identified infection/condition  Outcome: Progressing     Problem: SAFETY ADULT  Goal: Patient will remain free of falls  Description  INTERVENTIONS:  - Assess patient frequently for physical needs  -  Identify cognitive and physical deficits and behaviors that affect risk of falls    -  [place] fall precautions as indicated by assessment   - Educate patient/family on patient safety including physical limitations  - Instruct patient to call for assistance with activity based on assessment  - Modify environment to reduce risk of injury  - Consider OT/PT consult to assist with strengthening/mobility  Outcome: Progressing     Problem: DISCHARGE PLANNING  Goal: Discharge to home or other facility with appropriate resources  Description  INTERVENTIONS:  - Identify barriers to discharge w/patient and caregiver  - Arrange for needed discharge resources and transportation as appropriate  - Identify discharge learning needs (meds, wound care, etc )  - Arrange for interpretive services to assist at discharge as needed  - Refer to Case Management Department for coordinating discharge planning if the patient needs post-hospital services based on physician/advanced practitioner order or complex needs related to functional status, cognitive ability, or social support system  Outcome: Progressing     Problem: METABOLIC, FLUID AND ELECTROLYTES - ADULT  Goal: Electrolytes maintained within normal limits  Description  INTERVENTIONS:  - Monitor labs and assess patient for signs and symptoms of electrolyte imbalances  - Administer electrolyte replacement as ordered  - Monitor response to electrolyte replacements, including repeat lab results as appropriate  - Instruct patient on fluid and nutrition as appropriate  Outcome: Progressing  Goal: Fluid balance maintained  Description  INTERVENTIONS:  - Monitor labs and assess for signs and symptoms of volume excess or deficit  - Monitor I/O and WT  - Instruct patient on fluid and nutrition as appropriate  Outcome: Progressing     Problem: HEMATOLOGIC - ADULT  Goal: Maintains hematologic stability  Description  INTERVENTIONS  - Assess for signs and symptoms of bleeding or hemorrhage  - Monitor labs  - Administer supportive blood products/factors as ordered and appropriate  Outcome: Progressing

## 2019-07-09 ENCOUNTER — TELEPHONE (OUTPATIENT)
Dept: UROLOGY | Facility: MEDICAL CENTER | Age: 64
End: 2019-07-09

## 2019-07-09 VITALS
BODY MASS INDEX: 45.33 KG/M2 | HEIGHT: 59 IN | HEART RATE: 80 BPM | OXYGEN SATURATION: 97 % | RESPIRATION RATE: 19 BRPM | DIASTOLIC BLOOD PRESSURE: 71 MMHG | SYSTOLIC BLOOD PRESSURE: 116 MMHG | TEMPERATURE: 97.9 F | WEIGHT: 224.87 LBS

## 2019-07-09 DIAGNOSIS — E78.5 DYSLIPIDEMIA: ICD-10-CM

## 2019-07-09 LAB
ANION GAP SERPL CALCULATED.3IONS-SCNC: 6 MMOL/L (ref 4–13)
BUN SERPL-MCNC: 12 MG/DL (ref 5–25)
CALCIUM SERPL-MCNC: 9.4 MG/DL (ref 8.3–10.1)
CHLORIDE SERPL-SCNC: 100 MMOL/L (ref 100–108)
CO2 SERPL-SCNC: 28 MMOL/L (ref 21–32)
CREAT SERPL-MCNC: 0.77 MG/DL (ref 0.6–1.3)
ERYTHROCYTE [DISTWIDTH] IN BLOOD BY AUTOMATED COUNT: 14.3 % (ref 11.6–15.1)
GFR SERPL CREATININE-BSD FRML MDRD: 84 ML/MIN/1.73SQ M
GLUCOSE SERPL-MCNC: 107 MG/DL (ref 65–140)
GLUCOSE SERPL-MCNC: 118 MG/DL (ref 65–140)
GLUCOSE SERPL-MCNC: 153 MG/DL (ref 65–140)
HCT VFR BLD AUTO: 34.9 % (ref 34.8–46.1)
HGB BLD-MCNC: 11 G/DL (ref 11.5–15.4)
MCH RBC QN AUTO: 27.8 PG (ref 26.8–34.3)
MCHC RBC AUTO-ENTMCNC: 31.5 G/DL (ref 31.4–37.4)
MCV RBC AUTO: 88 FL (ref 82–98)
PLATELET # BLD AUTO: 442 THOUSANDS/UL (ref 149–390)
PMV BLD AUTO: 8.9 FL (ref 8.9–12.7)
POTASSIUM SERPL-SCNC: 4.1 MMOL/L (ref 3.5–5.3)
RBC # BLD AUTO: 3.95 MILLION/UL (ref 3.81–5.12)
SODIUM SERPL-SCNC: 134 MMOL/L (ref 136–145)
WBC # BLD AUTO: 12.9 THOUSAND/UL (ref 4.31–10.16)

## 2019-07-09 PROCEDURE — 85027 COMPLETE CBC AUTOMATED: CPT | Performed by: STUDENT IN AN ORGANIZED HEALTH CARE EDUCATION/TRAINING PROGRAM

## 2019-07-09 PROCEDURE — 99238 HOSP IP/OBS DSCHRG MGMT 30/<: CPT | Performed by: OBSTETRICS & GYNECOLOGY

## 2019-07-09 PROCEDURE — NC001 PR NO CHARGE: Performed by: OBSTETRICS & GYNECOLOGY

## 2019-07-09 PROCEDURE — 80048 BASIC METABOLIC PNL TOTAL CA: CPT | Performed by: STUDENT IN AN ORGANIZED HEALTH CARE EDUCATION/TRAINING PROGRAM

## 2019-07-09 PROCEDURE — 82948 REAGENT STRIP/BLOOD GLUCOSE: CPT

## 2019-07-09 PROCEDURE — 99233 SBSQ HOSP IP/OBS HIGH 50: CPT | Performed by: INTERNAL MEDICINE

## 2019-07-09 RX ORDER — LANOLIN ALCOHOL/MO/W.PET/CERES
3 CREAM (GRAM) TOPICAL
Qty: 15 TABLET | Refills: 0 | Status: SHIPPED | OUTPATIENT
Start: 2019-07-09 | End: 2019-07-24

## 2019-07-09 RX ORDER — GEMFIBROZIL 600 MG/1
TABLET, FILM COATED ORAL
Qty: 90 TABLET | Refills: 1 | Status: SHIPPED | OUTPATIENT
Start: 2019-07-09 | End: 2020-03-14

## 2019-07-09 RX ORDER — OXYCODONE HYDROCHLORIDE 5 MG/1
5 TABLET ORAL EVERY 4 HOURS PRN
Qty: 90 TABLET | Refills: 0 | Status: SHIPPED | OUTPATIENT
Start: 2019-07-09 | End: 2019-07-16 | Stop reason: SDUPTHER

## 2019-07-09 RX ORDER — PANTOPRAZOLE SODIUM 40 MG/1
TABLET, DELAYED RELEASE ORAL
Status: COMPLETED
Start: 2019-07-09 | End: 2019-07-09

## 2019-07-09 RX ORDER — LANOLIN ALCOHOL/MO/W.PET/CERES
3 CREAM (GRAM) TOPICAL
Status: DISCONTINUED | OUTPATIENT
Start: 2019-07-09 | End: 2019-07-09 | Stop reason: HOSPADM

## 2019-07-09 RX ORDER — LIDOCAINE 40 MG/G
CREAM TOPICAL AS NEEDED
Qty: 30 G | Refills: 0 | Status: SHIPPED | OUTPATIENT
Start: 2019-07-09 | End: 2019-07-26

## 2019-07-09 RX ORDER — ACETAMINOPHEN 325 MG/1
TABLET ORAL
Status: COMPLETED
Start: 2019-07-09 | End: 2019-07-09

## 2019-07-09 RX ORDER — ACETAMINOPHEN 325 MG/1
975 TABLET ORAL EVERY 8 HOURS SCHEDULED
Qty: 126 TABLET | Refills: 0 | Status: SHIPPED | OUTPATIENT
Start: 2019-07-09 | End: 2019-07-23

## 2019-07-09 RX ORDER — POLYETHYLENE GLYCOL 3350 17 G/17G
17 POWDER, FOR SOLUTION ORAL DAILY PRN
Qty: 14 EACH | Refills: 0 | Status: SHIPPED | OUTPATIENT
Start: 2019-07-09 | End: 2019-07-24 | Stop reason: ALTCHOICE

## 2019-07-09 RX ADMIN — OXYCODONE HYDROCHLORIDE 2.5 MG: 5 TABLET ORAL at 01:34

## 2019-07-09 RX ADMIN — VENLAFAXINE 75 MG: 37.5 TABLET ORAL at 08:32

## 2019-07-09 RX ADMIN — OXYBUTYNIN CHLORIDE 10 MG: 5 TABLET, EXTENDED RELEASE ORAL at 08:32

## 2019-07-09 RX ADMIN — ACETAMINOPHEN 975 MG: 325 TABLET ORAL at 05:33

## 2019-07-09 RX ADMIN — ARIPIPRAZOLE 10 MG: 10 TABLET ORAL at 08:32

## 2019-07-09 RX ADMIN — OXYCODONE HYDROCHLORIDE 2.5 MG: 5 TABLET ORAL at 11:03

## 2019-07-09 RX ADMIN — FUROSEMIDE 40 MG: 40 TABLET ORAL at 08:32

## 2019-07-09 RX ADMIN — LISINOPRIL 5 MG: 5 TABLET ORAL at 08:32

## 2019-07-09 RX ADMIN — GEMFIBROZIL 600 MG: 600 TABLET ORAL at 08:32

## 2019-07-09 RX ADMIN — RIVAROXABAN 20 MG: 20 TABLET, FILM COATED ORAL at 08:32

## 2019-07-09 RX ADMIN — ACETAMINOPHEN 975 MG: 325 TABLET ORAL at 12:48

## 2019-07-09 RX ADMIN — SULFAMETHOXAZOLE AND TRIMETHOPRIM 1 TABLET: 800; 160 TABLET ORAL at 08:31

## 2019-07-09 RX ADMIN — PANTOPRAZOLE SODIUM 40 MG: 40 TABLET, DELAYED RELEASE ORAL at 05:34

## 2019-07-09 RX ADMIN — VITAMIN D, TAB 1000IU (100/BT) 1000 UNITS: 25 TAB at 08:33

## 2019-07-09 NOTE — TELEPHONE ENCOUNTER
Left message for patient to return call to reschedule PTNS appointment  Appointment for tomorrow 7/10 cancelled

## 2019-07-09 NOTE — PROGRESS NOTES
Progress note - Palliative and Supportive Care   Judi Flores 66 y o  female [id number]    Assessment:  73-year-old female with stage IB endometrial cancer status post definitive surgical management, history of DVT and Xarelto with new right-sided pelvic mass and prominent lymph nodes noted on CT suspicious for metastatic disease s/p IR biopsy of mass yesterday, 7/9/2019    Plan:  1  Symptom management -   - Pain management:    Tylenol 975 mg p o  Every 8 hours scheduled                          Lidoderm patch topical q12hrs [address] to the right lower back -> will change to lidocaine 4% cream for discharge                            Roxicodone 2 5mg q4hrs PRN moderate pain                           Roxicodone 5mg q4hrs PRN severe pain                          Dilaudid 0 2mg IV q4hrs PRN breakthrough pain (to be discontinued upon discharge)   - Insomnia: Will start melatonin 3mg every night PRN insomnia   - Bowel regimen:     Continue senokot [address] and miralax PRN   - Depression:     Continue home abilify and effexor   - Nausea:     Resolved    2  Goals -    -Patient reports that she wants to remain a full code, await biopsy results to make any further decisions  Will take 5 wishes document home to further review, but currently is not ready to discuss  Code Status: FULL - Level 1   Decisional apparatus:  Patient is competent on my exam today  If competence is lost, patient's substitute decision maker would default to patient's sister and brother by [place]  Sister: Rui Aileen   Brother: Erna Alejo   Advance Directive / Living Will / POLST:  N/A    Interval history:  Patient reports that her pain is much improved with the pain regimen that was started yesterday 7/8/19  Reports no longer having bouts of pain that had her doubled over and crying  Now pain is bearable   Also reports that at nighttime she was experiencing less pain/ stiffness, but was still unable to sleep well because she has been having problems sleeping for weeks and naps too much during the day  MEDICATIONS / ALLERGIES:     all current active meds have been reviewed and current meds:   Current Facility-Administered Medications   Medication Dose Route Frequency    acetaminophen (TYLENOL) tablet 975 mg  975 mg Oral Q8H [address]    ARIPiprazole (ABILIFY) tablet 10 mg  10 mg Oral Daily    cholecalciferol (VITAMIN D3) tablet 1,000 Units  1,000 Units Oral Daily    furosemide (LASIX) tablet 40 mg  40 mg Oral BID    gemfibrozil (LOPID) tablet 600 mg  600 mg Oral Daily    insulin lispro (HumaLOG) 100 units/mL subcutaneous injection 1-6 Units  1-6 Units Subcutaneous TID AC    lisinopril (ZESTRIL) tablet 5 mg  5 mg Oral Daily    melatonin tablet 3 mg  3 mg Oral HS    ondansetron (ZOFRAN) injection 4 mg  4 mg Intravenous Q6H PRN    oxybutynin (DITROPAN-XL) 24 hr tablet 10 mg  10 mg Oral Daily    oxyCODONE (ROXICODONE) IR tablet 2 5 mg  2 5 mg Oral Q4H PRN    oxyCODONE (ROXICODONE) IR tablet 5 mg  5 mg Oral Q4H PRN    pantoprazole (PROTONIX) EC tablet 40 mg  40 mg Oral Early Morning    polyethylene glycol (MIRALAX) packet 17 g  17 g Oral Daily PRN    rivaroxaban (XARELTO) tablet 20 mg  20 mg Oral Daily With Breakfast    senna-docusate sodium (SENOKOT S) 8 6-50 mg per tablet 1 tablet  1 tablet Oral HS    sodium chloride 0 45 % with KCl 20 mEq/L infusion (premix)  125 mL/hr Intravenous Continuous    venlafaxine (EFFEXOR) tablet 75 mg  75 mg Oral TID       Allergies   Allergen Reactions    Cephalosporins Other (See Comments)     RASH - BILATERAL ARMS PER RN       OBJECTIVE:    Physical Exam  Physical Exam   Constitutional: She is oriented to person, place, and time  She appears well-developed and well-nourished  No distress  HENT:   Head: Normocephalic and atraumatic  Mouth/Throat: Oropharynx is clear and moist    Eyes: Pupils are equal, round, and reactive to light  Conjunctivae and EOM are normal    Neck: Normal range of motion   Neck supple  Cardiovascular: Normal rate, regular rhythm, normal heart sounds and intact distal pulses  Exam reveals no gallop and no friction rub  No murmur heard  Pulmonary/Chest: Effort normal and breath sounds normal  No stridor  No respiratory distress  She has no wheezes  She has no rales  She exhibits no tenderness  Abdominal: Soft  Bowel sounds are normal  She exhibits no distension  There is tenderness (mild tenderness in the RLQ, right flank, and right lumbar paraspinal musculature)  Musculoskeletal: Normal range of motion  She exhibits no edema  Neurological: She is alert and oriented to person, place, and time  Skin: Skin is warm and dry  She is not diaphoretic  Psychiatric: She has a normal mood and affect  Her behavior is normal    Nursing note and vitals reviewed  Lab Results:   I have personally reviewed pertinent labs  , CBC:   Lab Results   Component Value Date    WBC 12 90 (H) 07/09/2019    HGB 11 0 (L) 07/09/2019    HCT 34 9 07/09/2019    MCV 88 07/09/2019     (H) 07/09/2019    MCH 27 8 07/09/2019    MCHC 31 5 07/09/2019    RDW 14 3 07/09/2019    MPV 8 9 07/09/2019   , CMP:   Lab Results   Component Value Date    SODIUM 134 (L) 07/09/2019    K 4 1 07/09/2019     07/09/2019    CO2 28 07/09/2019    BUN 12 07/09/2019    CREATININE 0 77 07/09/2019    CALCIUM 9 4 07/09/2019    EGFR 84 07/09/2019     Imaging Studies: reviewed   EKG, Pathology, and Other Studies: no new pertinent    Counseling / Coordination of Care  Total floor / unit time spent today 25 minutes  Greater than 50% of total time was spent with the patient and / or family counseling and / or coordination of care  A description of the counseling / coordination of care: pain management, family discussion, goals of care

## 2019-07-09 NOTE — PROGRESS NOTES
Gyn Progress Note   Dylon Melendez 66 y o  female MRN: [id number]  Unit/Bed#: [facility] 909-01 Encounter: [id number]    Assessment/Plan:  53-year-old female with stage IB endometrial cancer status post definitive surgical management, history of DVT and Xarelto with new right-sided pelvic mass and prominent lymph nodes noted on CT suspicious for metastatic disease s/p IR biopsy of mass yesterday, 7/9/2019  Hospital day 3      1)  Stage IB endometrial cancer with new 5 cm soft tissue mass on the right pelvic sidewall concerning for metastatic disease  - s/p RATLH BSO PLND 12/2017  - S/p IR LN biopsy yesterday, 7/9/2019  - Radiation Oncology consultation completed and will follow up outpatient in 1 week following final diagnosis of biopsy  - Patient to follow up with Gyn Oncology following final results of biopsy in 1 week  - Appreciate palliative care input for pain management- will touch base for final recs and Rx for pain meds today  F/u outpatient    2) Right lower extremity DVT  - Restart Xarelto this AM  - Ambulation      3) HTN  - 110- 130/ 60-70s overnight  - Lasix 40mg BID  - home quinapril, linisopril as equivalent     4) HLD  - home gemfibrozil     5) DM  Metformin held for recent contrast- may continue once discharged  FS to assess need for ISS     6) GERD  - home omeprazole, protonix as equivalent     7) Depression  - home Effexor     8) FEN:   - CHO controlled diet  - Bowel regimen: Colace, Miralax     9) DVT PPx:   - home xarelto   - SCDs  - Ambulation     Disposition: Anticipate discharge home today, 7/9/2019 with outpatient follow up    Subjective:   Patient reports she is doing well this morning and that her pain is much improved on current pain regimen  She reports some difficulty sleeping overnight from anticipation of pain  She is tolerating regular diet, ambulating without difficulty and voiding spontaneously  She has not yet had a BM    Patient reports she feels comfortable going home today and we reviewed her follow up in 1 week with providers following final pathology of her biopsy  All questions and concerns addressed  /69   Pulse 81   Temp 98 1 °F (36 7 °C) (Oral)   Resp 20   Ht 4' 11" (1 499 m)   Wt 102 kg (224 lb 13 9 oz)   SpO2 95%   BMI 45 42 kg/m²     Lab Results   Component Value Date    WBC 13 58 (H) 07/08/2019    HGB 10 5 (L) 07/08/2019    HCT 33 3 (L) 07/08/2019    MCV 88 07/08/2019     (H) 07/08/2019       Lab Results   Component Value Date    GLUCOSE 219 (H) 12/19/2017    CALCIUM 9 5 07/08/2019     10/27/2017    K 3 7 07/08/2019    CO2 26 07/08/2019     07/08/2019    BUN 9 07/08/2019    CREATININE 0 65 07/08/2019       Lab Results   Component Value Date/Time    POCGLU 100 07/08/2019 08:57 PM    POCGLU 141 (H) 07/08/2019 04:46 PM    POCGLU 128 07/08/2019 11:28 AM    POCGLU 109 12/21/2017 10:30 AM    POCGLU 101 12/21/2017 07:02 AM       I/O last 3 completed shifts: In: 3055 [P O :480; I V :2575]  Out: 4950 [Urine:4950]  I/O this shift: In: 980 [I V :980]  Out: 1750 [Urine:1750]      Physical Exam  Physical Exam   Constitutional: She is oriented to person, place, and time  She appears well-developed and well-nourished  No distress  HENT:   Head: Normocephalic and atraumatic  Neck: Normal range of motion  Neck supple  Cardiovascular: Normal rate, regular rhythm and normal heart sounds  Exam reveals no gallop and no friction rub  No murmur heard  Pulmonary/Chest: Effort normal and breath sounds normal  No respiratory distress  She has no wheezes  She has no rales  Abdominal: Soft  There is no tenderness  There is no rebound and no guarding  Obese abdomen   Neurological: She is alert and oriented to person, place, and time  Skin: Skin is warm and dry  She is not diaphoretic  Psychiatric: She has a normal mood and affect  Her behavior is normal  Judgment and thought content normal    Vitals reviewed        Parth MD Chauncey   Gyn PGY-3  7/9/2019 6:20 AM

## 2019-07-09 NOTE — PALLIATIVE CARE CONFERENCE
Met with pt, who reports she has not reviewed the 5 Wishes at this time  She plans to review at home and with her therapist  The pt offers no other complaints/issues at this time

## 2019-07-09 NOTE — TELEPHONE ENCOUNTER
Patient seen by AP's in [place] managed by both Dr Rama Caicedo and Hiram Forte  Calling to cancel Nurse appointment tomorrow 7/10  She was just discharged from the hospital   She would like a return call to schedule

## 2019-07-10 ENCOUNTER — TRANSITIONAL CARE MANAGEMENT (OUTPATIENT)
Dept: FAMILY MEDICINE CLINIC | Facility: CLINIC | Age: 64
End: 2019-07-10

## 2019-07-10 NOTE — TELEPHONE ENCOUNTER
Called and left a message for patient to return call to speak with Clinical   She is a patient of Dr Justina Dela Cruz  I wanted to offer a 3 or 6 month FU just to touch base and check on her symptoms  Additionally the pelvic mass she was recently diagnosed with may be impacting her right ureter with hydro and I wanted to make sure that Dr Aline Tello did not need her to see Dr Justina Dela Cruz for this new finding  Awaiting call back

## 2019-07-10 NOTE — TELEPHONE ENCOUNTER
Patient called to advise she will not be rescheduling PTNS at this time  She is currently facing a possible Cancer Diagnoses and has cancelled future PTNS appointment as well

## 2019-07-12 ENCOUNTER — TELEPHONE (OUTPATIENT)
Dept: UROLOGY | Facility: AMBULATORY SURGERY CENTER | Age: 64
End: 2019-07-12

## 2019-07-12 NOTE — TELEPHONE ENCOUNTER
Patient is managed by Dr Ladi Pascual in the [place] office  Patient calling into requesting an appointment with Dr Ladi Pascual because she sad she has a mass in her pelvic area and her Dr is concerned that it may be affecting her kidneys  Ct results are in epic  Please advise on an appropriate follow up visit  Patient wants the [place] office

## 2019-07-15 ENCOUNTER — TELEPHONE (OUTPATIENT)
Dept: PALLIATIVE MEDICINE | Facility: CLINIC | Age: 64
End: 2019-07-15

## 2019-07-15 DIAGNOSIS — G89.3 CANCER ASSOCIATED PAIN: ICD-10-CM

## 2019-07-16 ENCOUNTER — TELEPHONE (OUTPATIENT)
Dept: RADIOLOGY | Facility: HOSPITAL | Age: 64
End: 2019-07-16

## 2019-07-16 ENCOUNTER — OFFICE VISIT (OUTPATIENT)
Dept: GYNECOLOGIC ONCOLOGY | Facility: CLINIC | Age: 64
End: 2019-07-16
Payer: COMMERCIAL

## 2019-07-16 ENCOUNTER — TELEPHONE (OUTPATIENT)
Dept: PALLIATIVE MEDICINE | Facility: CLINIC | Age: 64
End: 2019-07-16

## 2019-07-16 ENCOUNTER — PATIENT OUTREACH (OUTPATIENT)
Dept: FAMILY MEDICINE CLINIC | Facility: CLINIC | Age: 64
End: 2019-07-16

## 2019-07-16 VITALS
HEIGHT: 59 IN | WEIGHT: 223 LBS | RESPIRATION RATE: 16 BRPM | DIASTOLIC BLOOD PRESSURE: 82 MMHG | SYSTOLIC BLOOD PRESSURE: 148 MMHG | HEART RATE: 90 BPM | BODY MASS INDEX: 44.96 KG/M2

## 2019-07-16 DIAGNOSIS — G89.3 CANCER ASSOCIATED PAIN: Primary | ICD-10-CM

## 2019-07-16 DIAGNOSIS — C54.1 ENDOMETRIAL CANCER (HCC): ICD-10-CM

## 2019-07-16 DIAGNOSIS — I82.4Y1 ACUTE DEEP VEIN THROMBOSIS (DVT) OF PROXIMAL VEIN OF RIGHT LOWER EXTREMITY (HCC): ICD-10-CM

## 2019-07-16 DIAGNOSIS — E66.01 CLASS 3 SEVERE OBESITY DUE TO EXCESS CALORIES WITHOUT SERIOUS COMORBIDITY WITH BODY MASS INDEX (BMI) OF 45.0 TO 49.9 IN ADULT (HCC): ICD-10-CM

## 2019-07-16 DIAGNOSIS — N13.39 OTHER HYDRONEPHROSIS: ICD-10-CM

## 2019-07-16 DIAGNOSIS — C54.1 ENDOMETRIAL CANCER (HCC): Primary | ICD-10-CM

## 2019-07-16 DIAGNOSIS — Z71.89 COMPLEX CARE COORDINATION: Primary | ICD-10-CM

## 2019-07-16 PROBLEM — Z90.710 S/P HYSTERECTOMY: Status: RESOLVED | Noted: 2017-12-19 | Resolved: 2019-07-16

## 2019-07-16 PROBLEM — Z45.2 ENCOUNTER FOR CENTRAL LINE CARE: Status: ACTIVE | Noted: 2019-07-16

## 2019-07-16 PROBLEM — E55.9 VITAMIN D DEFICIENCY: Status: RESOLVED | Noted: 2017-12-19 | Resolved: 2019-07-16

## 2019-07-16 PROBLEM — Z12.11 COLON CANCER SCREENING: Status: RESOLVED | Noted: 2019-06-19 | Resolved: 2019-07-16

## 2019-07-16 PROBLEM — R73.09 OTHER ABNORMAL GLUCOSE: Status: RESOLVED | Noted: 2017-12-19 | Resolved: 2019-07-16

## 2019-07-16 PROBLEM — R35.0 FREQUENCY OF URINATION: Status: RESOLVED | Noted: 2017-12-19 | Resolved: 2019-07-16

## 2019-07-16 PROBLEM — Z12.11 SPECIAL SCREENING FOR MALIGNANT NEOPLASMS, COLON: Status: RESOLVED | Noted: 2018-10-24 | Resolved: 2019-07-16

## 2019-07-16 PROCEDURE — 1111F DSCHRG MED/CURRENT MED MERGE: CPT | Performed by: OBSTETRICS & GYNECOLOGY

## 2019-07-16 PROCEDURE — 99215 OFFICE O/P EST HI 40 MIN: CPT | Performed by: OBSTETRICS & GYNECOLOGY

## 2019-07-16 RX ORDER — OXYCODONE HCL 20 MG/1
20 TABLET, FILM COATED, EXTENDED RELEASE ORAL EVERY 12 HOURS SCHEDULED
Qty: 14 TABLET | Refills: 0 | Status: SHIPPED | OUTPATIENT
Start: 2019-07-16 | End: 2019-07-19 | Stop reason: SDUPTHER

## 2019-07-16 RX ORDER — OXYCODONE HCL 20 MG/1
20 TABLET, FILM COATED, EXTENDED RELEASE ORAL EVERY 12 HOURS SCHEDULED
Qty: 14 TABLET | Refills: 0 | Status: SHIPPED | OUTPATIENT
Start: 2019-07-16 | End: 2019-07-16 | Stop reason: RX

## 2019-07-16 RX ORDER — SODIUM CHLORIDE 9 MG/ML
75 INJECTION, SOLUTION INTRAVENOUS CONTINUOUS
Status: CANCELLED | OUTPATIENT
Start: 2019-07-16

## 2019-07-16 RX ORDER — OXYCODONE HYDROCHLORIDE 5 MG/1
5 TABLET ORAL EVERY 4 HOURS PRN
Qty: 42 TABLET | Refills: 0 | Status: SHIPPED | OUTPATIENT
Start: 2019-07-16 | End: 2019-07-19 | Stop reason: SDUPTHER

## 2019-07-16 NOTE — ASSESSMENT & PLAN NOTE
Patient is on Xarelto  She will potentially need anticoagulation for life for for as long as her disease remains active  At minimum, she will be treated for 9-12 months

## 2019-07-16 NOTE — LETTER
July 16, 2019     [facility] DO Sameer  [address]    Patient: Narciso Tadeo   YOB: 1955   Date of Visit: 7/16/2019       Dear Dr Chely Rich:    Thank you for referring Kilo Miller to me for evaluation  Below are my notes for this consultation  If you have questions, please do not hesitate to call me  I look forward to following your patient along with you  Sincerely,        Leanna Whyte MD        CC: MD Rina Ruby MD Shoshana Ball, MD  7/16/2019  9:08 AM  Sign at close encounter  Assessment/Plan:    Problem List Items Addressed This Visit        Cardiovascular and Mediastinum    Acute deep vein thrombosis (DVT) of proximal vein of lower extremity ([address] )     Patient is on Xarelto  She will potentially need anticoagulation for life for for as long as her disease remains active  At minimum, she will be treated for 9-12 months  Genitourinary    Endometrial cancer ([address] ) - Primary     Stage IB endometrial cancer, comprehensively staged in 2017  Now with biopsy-proven evidence of right pelvic sidewall recurrence  This is causing significant neuropathic pain, deep vein thrombosis and some degree of hydronephrosis currently with normal creatinine  Case was discussed at multidisciplinary tumor conference  Recommendation for systemic chemotherapy possibly followed by radiotherapy  Also recommended consultation with Urology which patient has scheduled  Counseled patient today about chemotherapy with carboplatin AUC 6 and Taxol 175 mg/m2 to be administered every 21 days  We plan to obtain CT scan after cycle 3  I discussed with patient rationale, logistics, common side effects and toxicities associated with chemotherapy regimen    She understands toxicities include but are not limited to alopecia, fatigue, decreased appetite, nausea and vomiting, peripheral neuropathy, bone marrow suppression ( anemia, neutropenia and or thrombocytopenia) with subsequent risk of life-threatening infection or hemorrhage, kidney and or liver damage, etc   Patient verbalizes understanding, agrees and wants to proceed  Relevant Orders    IR port Placement    Other hydronephrosis       Other    Class 3 severe obesity with body mass index (BMI) of 45 0 to 49 9 in adult [facility])            CHIEF COMPLAINT:     Pre chemotherapy visit, discussion of treatment for recurrent endometrial cancer  Problem:  Cancer Staging  Endometrial cancer [facility])  Staging form: Corpus Uteri - Carcinoma, AJCC 7th Edition  - Clinical stage from 12/19/2017: FIGO Stage IB (T1b, N0, M0) - Signed by Lynette Garner MD on 2/12/2018        Previous therapy:     Endometrial [address] [address] )    11/17/2017 Initial Diagnosis     Endometrial cancer ([facility] [address] )      12/19/2017 Surgery     Robotic assisted total laparoscopic hysterectomy with bilateral salpingo-oophorectomy and sentinel bilateral pelvic lymph node dissection  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 (4 4 x 3 2 cm tumor, 9 4/15 4mm invasion, NO LVSI, washings revealed atypical cellular changes)      12/19/2017 Genetic Testing     Morrison testing negative      7/8/2019 Recurrence     Presented with right lower extremity DVT and CT demonstrating right pelvic sidewall mass with venous, ureteral and nerve compression causing significant neuropathic pain  Core biopsy demonstrates high-grade carcinoma  Patient ID: Nora Bernstein is a 66 y o  female  HPI  Patient presents for follow-up  She has a history of stage IB low risk endometrial cancer comprehensively staged in December 2017  Recently presented with right lower extremity deep vein thrombosis  Workup has now demonstrated right pelvic sidewall mass and biopsy-proven high-grade carcinoma consistent with tumor recurrence  She is here for follow-up  She has significant right groin/pelvic sidewall related pain    Recently admitted to the hospital   Seen by palliative care, plan to follow up with them  Also with hydronephrosis, scheduled to see Urology soon  Denies vaginal bleeding, drainage or discharge  Denies nausea vomiting  Denies neuropathy  Case was discussed at multidisciplinary tumor conference with recommendation for systemic chemotherapy  She presents for pre chemotherapy visit  The following portions of the patient's history were reviewed and updated as appropriate: allergies, current medications, past family history, past medical history, past social history, past surgical history and problem list     Review of Systems  As above  Twelve point review of systems otherwise unremarkable    Current Outpatient Medications   Medication Sig Dispense Refill    acetaminophen (TYLENOL) 325 mg tablet Take 3 tablets (975 mg total) by mouth every 8 (eight) hours for 14 days 126 tablet 0    ARIPiprazole (ABILIFY) 10 mg tablet Take 10 mg by mouth daily      oxyCODONE (ROXICODONE) 5 mg immediate release tablet Take 1 tablet (5 mg total) by mouth every 4 (four) hours as needed for severe pain for up to 15 days Please take 1/2 tablet (2 5mg) every 4 hours as needed for moderate pain   Please take 1 tablet (5mg) every 4 hours as needed for severe painMax Daily Amount: 30 mg 90 tablet 0    aspirin (ECOTRIN LOW STRENGTH) 81 mg EC tablet Take 81 mg by mouth daily      cholecalciferol (VITAMIN D3) 1,000 units tablet Take 1,000 Units by mouth daily      dexmethylphenidate (FOCALIN XR) 10 MG 24 hr capsule Take 10 mg by mouth daily      Ferrous Sulfate (IRON) 28 MG TABS Take by mouth      furosemide (LASIX) 40 mg tablet Take 1 tablet (40 mg total) by mouth 2 (two) times a day 30 tablet 0    gemfibrozil (LOPID) 600 mg tablet TAKE 1 TABLET BY MOUTH DAILY 90 tablet 1    lidocaine (LMX) 4 % cream Apply topically as needed for mild pain for up to 14 days Apply topically to the right low back for mild pain/stiffness 30 g 0    melatonin 3 mg Take 1 tablet (3 mg total) by mouth daily at bedtime for 15 days 15 tablet 0    metFORMIN (GLUCOPHAGE) 500 mg tablet Take 1 tablet (500 mg total) by mouth daily 180 tablet 1    MYRBETRIQ 50 MG TB24 TAKE 1 TABLET DAILY 30 tablet 10    omeprazole (PriLOSEC) 20 mg delayed release capsule Take 20 mg by mouth daily      oxybutynin (DITROPAN-XL) 10 MG 24 hr tablet TAKE 1 TABLET BY MOUTH EVERY DAY 30 tablet 11    polyethylene glycol (MIRALAX) 17 g packet Take 17 g by mouth daily as needed (as needed for constipation) 14 each 0    potassium chloride (K-DUR,KLOR-CON) 20 mEq tablet Take 1 tablet (20 mEq total) by mouth 2 (two) times a day 30 tablet 0    quinapril (ACCUPRIL) 5 mg tablet TAKE 1 TABLET BY MOUTH DAILY AT BEDTIME 90 tablet 1    rivaroxaban (XARELTO) 15 & 20 MG starter pack 15 mg bid x 21 days then 20 mg daily 1 Package 0    rivaroxaban (XARELTO) 20 mg tablet Take 1 tablet (20 mg total) by mouth daily with breakfast 30 tablet 4    traMADol (ULTRAM) 50 mg tablet Take 1 tablet (50 mg total) by mouth daily at bedtime as needed for moderate pain 30 tablet 0    venlafaxine (EFFEXOR) 75 mg tablet Take 75 mg by mouth 3 (three) times a day         No current facility-administered medications for this visit  Objective:    Blood pressure 148/82, pulse 90, resp  rate 16, height 4' 11" (1 499 m), weight 101 kg (223 lb)  Body mass index is 45 04 kg/m²  Body surface area is 1 93 meters squared  Physical Exam   Constitutional: She is oriented to person, place, and time  She appears well-developed  Obese  HENT:   Head: Normocephalic and atraumatic  Mouth/Throat: No oropharyngeal exudate  Eyes: Conjunctivae are normal  Right eye exhibits no discharge  Left eye exhibits no discharge  No scleral icterus  Neck: Normal range of motion  Neck supple  No tracheal deviation present  No thyromegaly present  Cardiovascular: Normal rate, regular rhythm and normal heart sounds  Exam reveals no gallop     No murmur heard  Pulmonary/Chest: Effort normal and breath sounds normal  No respiratory distress  She has no wheezes  She has no rhonchi  She has no rales  Abdominal: Soft  Bowel sounds are normal  She exhibits no distension and no mass  There is no tenderness  There is no rebound and no guarding  Hernia confirmed negative in the right inguinal area and confirmed negative in the left inguinal area  Genitourinary: There is no rash or lesion on the right labia  There is no rash or lesion on the left labia  Uterus is not enlarged and not tender  Cervix exhibits no motion tenderness, no discharge and no friability  Right adnexum displays no mass, no tenderness and no fullness  Left adnexum displays no mass, no tenderness and no fullness  No bleeding in the vagina  Genitourinary Comments: Pelvic exam deferred  Musculoskeletal: She exhibits no edema or tenderness  Lymphadenopathy:     She has no cervical adenopathy  Right: No inguinal adenopathy present  Left: No inguinal adenopathy present  Neurological: She is alert and oriented to person, place, and time  Skin: Skin is dry  Psychiatric: She has a normal mood and affect  Her behavior is normal  Judgment and thought content normal    Vitals reviewed      Case Report   Surgical Pathology Report                         Case: [id number]                                    Authorizing Provider: Charla Temple MD           Collected:           07/08/2019 1230               Ordering Location:     [facility]      Received:            07/08/2019 [address] [place] [address] 9                                                               Pathologist:           Gissell Villela MD                                                    Specimen:    Lymph Node, Right pelvic lymph node x3                                                     Final Diagnosis   AMENDMENT NOTE: Included intraoperative diagnosis   No change in diagnosis      A  Lymph Node, Right pelvic lymph node x3:  - High-grade adenocarcinoma  See comment      Comment: There is significant necrosis with viable tumor demonstrating rare gland formation and high grade nuclei  Lymphoid tissue is not identified  Immunohistochemistry is strongly positive in the tumor cells for AE1/3, CK7, moderately positive for Gata3, and weakly positive for PAX8 and ER with aberrant p53 expression and patchy staining for p16  Negative stains include CK20, CDX2, TTF-1, mammaglobin, GCDFP15, WT-1, uroplakin, and p63  A history of previously diagnosed FIGO 1 endometrioid adenocarcinoma and abnormal breast ultrasound with negative biopsy is noted  In summary, the findings are non-specific as to site of origin  Diagnostic considerations include, but not limited to, metastatic gynecologic or breast carcinoma or a primary peritoneal serous carcinoma  Clinical and radiologic correlation is recommended  Intradepartmental consultation is in agreement      Interpretation performed at [facility], [address] [facility] [address] [facility]   Amendment electronically signed by Marcie Chan MD on 7/11/2019 at  4:28 PM   Electronically signed by Marcie Chan MD on 7/11/2019 at  9:51 AM   Additional Information    All controls performed with the immunohistochemical stains reported above reacted appropriately  These tests were developed and their performance characteristics determined by [facility] or [facility]  They may not be cleared or approved by the [place]  Food and Drug Administration  The FDA has determined that such clearance or approval is not necessary  These tests are used for clinical purposes  They should not be regarded as investigational or for research  This laboratory has been approved by [place] [id number], designated as a high-complexity laboratory and is qualified to perform these tests     Intraoperative Consultation Malignant cells present  Three (3) cores taken  Dr Krishna aCm to Dr Ward Post on 7/8/19 @ 1:05 pm    Jed Saba Description    A  The specimen is received in formalin, labeled with the patient's name and hospital number, and is designated "pelvic lymph node x3  The specimen consists of a single tan soft tissue core measuring 0 1 cm in diameter and 0 6 cm in length submitted in between sponges  Also in the container are 4 tan-red soft tissue fragments measuring 0 1 cm in greatest dimensions  The soft tissue fragments are drained into an embedding bag  Entirely submitted  Two cassettes    Note: due to the size and consistency of specimen, the tissue may not survive histologic processing      Note: The estimated total formalin fixation time based upon information provided by the submitting clinician and the standard processing schedule is under 72 hours      Autumn Grant MD, [address] [place] [address]  7/16/2019  9:08 AM

## 2019-07-16 NOTE — ASSESSMENT & PLAN NOTE
Stage IB endometrial cancer, comprehensively staged in 2017  Now with biopsy-proven evidence of right pelvic sidewall recurrence  This is causing significant neuropathic pain, deep vein thrombosis and some degree of hydronephrosis currently with normal creatinine  Case was discussed at multidisciplinary tumor conference  Recommendation for systemic chemotherapy possibly followed by radiotherapy  Also recommended consultation with Urology which patient has scheduled  Counseled patient today about chemotherapy with carboplatin AUC 6 and Taxol 175 mg/m2 to be administered every 21 days  We plan to obtain CT scan after cycle 3  I discussed with patient rationale, logistics, common side effects and toxicities associated with chemotherapy regimen  She understands toxicities include but are not limited to alopecia, fatigue, decreased appetite, nausea and vomiting, peripheral neuropathy, bone marrow suppression ( anemia, neutropenia and or thrombocytopenia) with subsequent risk of life-threatening infection or hemorrhage, kidney and or liver damage, etc   Patient verbalizes understanding, agrees and wants to proceed

## 2019-07-16 NOTE — PROGRESS NOTES
Patient's [facility] pharmacy does not carry oxycontin 20mg tablets  Cancelled prescription at patient's pharmacy and sent to different [facility] pharmacy that we confirmed carries the medication  Patient aware

## 2019-07-16 NOTE — PROGRESS NOTES
PCP referral for care management of medically complex patient  Initial call, voice message left with contact information asking patient to return care manager's call

## 2019-07-16 NOTE — PROGRESS NOTES
Assessment/Plan:    Problem List Items Addressed This Visit        Cardiovascular and Mediastinum    Acute deep vein thrombosis (DVT) of proximal vein of lower extremity ([facility] [address] )     Patient is on Xarelto  She will potentially need anticoagulation for life for for as long as her disease remains active  At minimum, she will be treated for 9-12 months  Genitourinary    Endometrial cancer ([facility] [address] ) - Primary     Stage IB endometrial cancer, comprehensively staged in 2017  Now with biopsy-proven evidence of right pelvic sidewall recurrence  This is causing significant neuropathic pain, deep vein thrombosis and some degree of hydronephrosis currently with normal creatinine  Case was discussed at multidisciplinary tumor conference  Recommendation for systemic chemotherapy possibly followed by radiotherapy  Also recommended consultation with Urology which patient has scheduled  Counseled patient today about chemotherapy with carboplatin AUC 6 and Taxol 175 mg/m2 to be administered every 21 days  We plan to obtain CT scan after cycle 3  I discussed with patient rationale, logistics, common side effects and toxicities associated with chemotherapy regimen  She understands toxicities include but are not limited to alopecia, fatigue, decreased appetite, nausea and vomiting, peripheral neuropathy, bone marrow suppression ( anemia, neutropenia and or thrombocytopenia) with subsequent risk of life-threatening infection or hemorrhage, kidney and or liver damage, etc   Patient verbalizes understanding, agrees and wants to proceed  Relevant Orders    IR port Placement    Other hydronephrosis       Other    Class 3 severe obesity with body mass index (BMI) of 45 0 to 49 9 in adult [facility])            CHIEF COMPLAINT:     Pre chemotherapy visit, discussion of treatment for recurrent endometrial cancer      Problem:  Cancer Staging  Endometrial cancer [facility])  Staging form: Corpus Uteri - Carcinoma, AJCC 7th Edition  - Clinical stage from 12/19/2017: FIGO Stage IB (T1b, N0, M0) - Signed by Sharyn Sebastian MD on 2/12/2018        Previous therapy:     Endometrial cancer ([facility] [address] )    11/17/2017 Initial Diagnosis     Endometrial cancer ([facility] [address] )      12/19/2017 Surgery     Robotic assisted total laparoscopic hysterectomy with bilateral salpingo-oophorectomy and sentinel bilateral pelvic lymph node dissection  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 (4 4 x 3 2 cm tumor, 9 4/15 4mm invasion, NO LVSI, washings revealed atypical cellular changes)      12/19/2017 Genetic Testing     Morrison testing negative      7/8/2019 Recurrence     Presented with right lower extremity DVT and CT demonstrating right pelvic sidewall mass with venous, ureteral and nerve compression causing significant neuropathic pain  Core biopsy demonstrates high-grade carcinoma  Patient ID: Livia Adler is a 66 y o  female  HPI  Patient presents for follow-up  She has a history of stage IB low risk endometrial cancer comprehensively staged in December 2017  Recently presented with right lower extremity deep vein thrombosis  Workup has now demonstrated right pelvic sidewall mass and biopsy-proven high-grade carcinoma consistent with tumor recurrence  She is here for follow-up  She has significant right groin/pelvic sidewall related pain  Recently admitted to the hospital   Seen by palliative care, plan to follow up with them  Also with hydronephrosis, scheduled to see Urology soon  Denies vaginal bleeding, drainage or discharge  Denies nausea vomiting  Denies neuropathy  Case was discussed at multidisciplinary tumor conference with recommendation for systemic chemotherapy  She presents for pre chemotherapy visit    The following portions of the patient's history were reviewed and updated as appropriate: allergies, current medications, past family history, past medical history, past social history, past surgical history and problem list     Review of Systems  As above  Twelve point review of systems otherwise unremarkable    Current Outpatient Medications   Medication Sig Dispense Refill    acetaminophen (TYLENOL) 325 mg tablet Take 3 tablets (975 mg total) by mouth every 8 (eight) hours for 14 days 126 tablet 0    ARIPiprazole (ABILIFY) 10 mg tablet Take 10 mg by mouth daily      oxyCODONE (ROXICODONE) 5 mg immediate release tablet Take 1 tablet (5 mg total) by mouth every 4 (four) hours as needed for severe pain for up to 15 days Please take 1/2 tablet (2 5mg) every 4 hours as needed for moderate pain   Please take 1 tablet (5mg) every 4 hours as needed for severe painMax Daily Amount: 30 mg 90 tablet 0    aspirin (ECOTRIN LOW STRENGTH) 81 mg EC tablet Take 81 mg by mouth daily      cholecalciferol (VITAMIN D3) 1,000 units tablet Take 1,000 Units by mouth daily      dexmethylphenidate (FOCALIN XR) 10 MG 24 hr capsule Take 10 mg by mouth daily      Ferrous Sulfate (IRON) 28 MG TABS Take by mouth      furosemide (LASIX) 40 mg tablet Take 1 tablet (40 mg total) by mouth 2 (two) times a day 30 tablet 0    gemfibrozil (LOPID) 600 mg tablet TAKE 1 TABLET BY MOUTH DAILY 90 tablet 1    lidocaine (LMX) 4 % cream Apply topically as needed for mild pain for up to 14 days Apply topically to the right low back for mild pain/stiffness 30 g 0    melatonin 3 mg Take 1 tablet (3 mg total) by mouth daily at bedtime for 15 days 15 tablet 0    metFORMIN (GLUCOPHAGE) 500 mg tablet Take 1 tablet (500 mg total) by mouth daily 180 tablet 1    MYRBETRIQ 50 MG TB24 TAKE 1 TABLET DAILY 30 tablet 10    omeprazole (PriLOSEC) 20 mg delayed release capsule Take 20 mg by mouth daily      oxybutynin (DITROPAN-XL) 10 MG 24 hr tablet TAKE 1 TABLET BY MOUTH EVERY DAY 30 tablet 11    polyethylene glycol (MIRALAX) 17 g packet Take 17 g by mouth daily as needed (as needed for constipation) 14 each 0    potassium chloride (K-DUR,KLOR-CON) 20 mEq tablet Take 1 tablet (20 mEq total) by mouth 2 (two) times a day 30 tablet 0    quinapril (ACCUPRIL) 5 mg tablet TAKE 1 TABLET BY MOUTH DAILY AT BEDTIME 90 tablet 1    rivaroxaban (XARELTO) 15 & 20 MG starter pack 15 mg bid x 21 days then 20 mg daily 1 Package 0    rivaroxaban (XARELTO) 20 mg tablet Take 1 tablet (20 mg total) by mouth daily with breakfast 30 tablet 4    traMADol (ULTRAM) 50 mg tablet Take 1 tablet (50 mg total) by mouth daily at bedtime as needed for moderate pain 30 tablet 0    venlafaxine (EFFEXOR) 75 mg tablet Take 75 mg by mouth 3 (three) times a day         No current facility-administered medications for this visit  Objective:    Blood pressure 148/82, pulse 90, resp  rate 16, height 4' 11" (1 499 m), weight 101 kg (223 lb)  Body mass index is 45 04 kg/m²  Body surface area is 1 93 meters squared  Physical Exam   Constitutional: She is oriented to person, place, and time  She appears well-developed  Obese  HENT:   Head: Normocephalic and atraumatic  Mouth/Throat: No oropharyngeal exudate  Eyes: Conjunctivae are normal  Right eye exhibits no discharge  Left eye exhibits no discharge  No scleral icterus  Neck: Normal range of motion  Neck supple  No tracheal deviation present  No thyromegaly present  Cardiovascular: Normal rate, regular rhythm and normal heart sounds  Exam reveals no gallop  No murmur heard  Pulmonary/Chest: Effort normal and breath sounds normal  No respiratory distress  She has no wheezes  She has no rhonchi  She has no rales  Abdominal: Soft  Bowel sounds are normal  She exhibits no distension and no mass  There is no tenderness  There is no rebound and no guarding  Hernia confirmed negative in the right inguinal area and confirmed negative in the left inguinal area  Genitourinary: There is no rash or lesion on the right labia  There is no rash or lesion on the left labia  Uterus is not enlarged and not tender   Cervix exhibits no motion tenderness, no discharge and no friability  Right adnexum displays no mass, no tenderness and no fullness  Left adnexum displays no mass, no tenderness and no fullness  No bleeding in the vagina  Genitourinary Comments: Pelvic exam deferred  Musculoskeletal: She exhibits no edema or tenderness  Lymphadenopathy:     She has no cervical adenopathy  Right: No inguinal adenopathy present  Left: No inguinal adenopathy present  Neurological: She is alert and oriented to person, place, and time  Skin: Skin is dry  Psychiatric: She has a normal mood and affect  Her behavior is normal  Judgment and thought content normal    Vitals reviewed  Case Report   Surgical Pathology Report                         Case: [id number]                                    Authorizing Provider: Elsy Irizarry MD           Collected:           07/08/2019 1230               Ordering Location:     [facility]      Received:            07/08/2019 [address] [place] [address] 9                                                               Pathologist:           Virgie Sexton MD                                                    Specimen:    Lymph Node, Right pelvic lymph node x3                                                     Final Diagnosis   AMENDMENT NOTE: Included intraoperative diagnosis   No change in diagnosis      A  Lymph Node, Right pelvic lymph node x3:  - High-grade adenocarcinoma  See comment      Comment: There is significant necrosis with viable tumor demonstrating rare gland formation and high grade nuclei  Lymphoid tissue is not identified  Immunohistochemistry is strongly positive in the tumor cells for AE1/3, CK7, moderately positive for Gata3, and weakly positive for PAX8 and ER with aberrant p53 expression and patchy staining for p16  Negative stains include CK20, CDX2, TTF-1, mammaglobin, GCDFP15, WT-1, uroplakin, and p63    A history of previously diagnosed FIGO 1 endometrioid adenocarcinoma and abnormal breast ultrasound with negative biopsy is noted  In summary, the findings are non-specific as to site of origin  Diagnostic considerations include, but not limited to, metastatic gynecologic or breast carcinoma or a primary peritoneal serous carcinoma  Clinical and radiologic correlation is recommended  Intradepartmental consultation is in agreement      Interpretation performed at [facility], [address] [facility]   Amendment electronically signed by Lyn Zimmer MD on 7/11/2019 at  4:28 PM   Electronically signed by Lyn Zimmer MD on 7/11/2019 at  9:51 AM   Additional Information    All controls performed with the immunohistochemical stains reported above reacted appropriately  These tests were developed and their performance characteristics determined by [address] [place] [address] or [facility]  They may not be cleared or approved by the [place]  Food and Drug Administration  The FDA has determined that such clearance or approval is not necessary  These tests are used for clinical purposes  They should not be regarded as investigational or for research  This laboratory has been approved by [place] [id number], designated as a high-complexity laboratory and is qualified to perform these tests  Intraoperative Consultation    Malignant cells present  Three (3) cores taken  Dr Susan Mclain to Dr Esther Palafox on 7/8/19 @ 1:05 pm    Kaylni Kapoor Description    A  The specimen is received in formalin, labeled with the patient's name and hospital number, and is designated "pelvic lymph node x3  The specimen consists of a single tan soft tissue core measuring 0 1 cm in diameter and 0 6 cm in length submitted in between sponges  Also in the container are 4 tan-red soft tissue fragments measuring 0 1 cm in greatest dimensions  The soft tissue fragments are drained into an embedding bag  Entirely submitted   Two cassettes    Note: due to the size and consistency of specimen, the tissue may not survive histologic processing      Note: The estimated total formalin fixation time based upon information provided by the submitting clinician and the standard processing schedule is under 72 hours      Tracie Jeffers MD, [address] [place] [address]  7/16/2019  9:08 AM

## 2019-07-16 NOTE — TELEPHONE ENCOUNTER
7/11/19 referral for complex care management  7/15 Palliative care note:  Counseled patient for the remainder of today to take Roxicodone 10mg q4hrs PRN moderate pain and see if that helps last for 4 hours- if it does, then I will prescribe 10mg pills  If it does not help, then will prescribe long-acting oxycodone instead  Patient is amenable to this plan and will call me back tomorrow to update regarding pain status  7/16   Patient called back stating that pain was not improved with 10mg roxicodone q 4 hrs and that she is still having increasing pain after 2 hours  Will start patient on oxycontin 20mg PO BID x 7 days with roxicodone 5 mg PO q4hrs PRN severe pain also for 7 days refill  Called patient and made her aware of instructions as to how to take the medications and that she needs to follow up in the office in 1 week for reassessment

## 2019-07-16 NOTE — TELEPHONE ENCOUNTER
The oxycontin 20mg er tablets have been approved for  A period of 1 year through July 16th, 2020  Phone call to pharmacy to make them aware and phone call to patient to make her aware   Elisha Quintana

## 2019-07-17 ENCOUNTER — TELEPHONE (OUTPATIENT)
Dept: PALLIATIVE MEDICINE | Facility: CLINIC | Age: 64
End: 2019-07-17

## 2019-07-17 DIAGNOSIS — K59.03 THERAPEUTIC OPIOID INDUCED CONSTIPATION: Primary | ICD-10-CM

## 2019-07-17 DIAGNOSIS — T45.1X5A CHEMOTHERAPY-INDUCED NAUSEA: ICD-10-CM

## 2019-07-17 DIAGNOSIS — R11.0 CHEMOTHERAPY-INDUCED NAUSEA: ICD-10-CM

## 2019-07-17 DIAGNOSIS — C54.1 ENDOMETRIAL CANCER (HCC): Primary | ICD-10-CM

## 2019-07-17 DIAGNOSIS — T40.2X5A THERAPEUTIC OPIOID INDUCED CONSTIPATION: Primary | ICD-10-CM

## 2019-07-17 RX ORDER — SENNA PLUS 8.6 MG/1
2 TABLET ORAL 2 TIMES DAILY
Qty: 56 TABLET | Refills: 0 | Status: SHIPPED | OUTPATIENT
Start: 2019-07-17 | End: 2019-09-04

## 2019-07-17 RX ORDER — LORAZEPAM 1 MG/1
1 TABLET ORAL EVERY 6 HOURS PRN
Qty: 36 TABLET | Refills: 1 | Status: ON HOLD | OUTPATIENT
Start: 2019-07-17 | End: 2019-10-08 | Stop reason: SDUPTHER

## 2019-07-17 RX ORDER — ONDANSETRON HYDROCHLORIDE 8 MG/1
8 TABLET, FILM COATED ORAL EVERY 8 HOURS PRN
Qty: 30 TABLET | Refills: 1 | Status: ON HOLD | OUTPATIENT
Start: 2019-07-17 | End: 2020-08-06 | Stop reason: CLARIF

## 2019-07-17 RX ORDER — BISACODYL 10 MG
10 SUPPOSITORY, RECTAL RECTAL DAILY
Qty: 12 SUPPOSITORY | Refills: 0 | Status: SHIPPED | OUTPATIENT
Start: 2019-07-17 | End: 2019-07-24 | Stop reason: ALTCHOICE

## 2019-07-17 NOTE — TELEPHONE ENCOUNTER
Called patient back  Patient reports she is already taking miralax every day  So, will prescribe senokot 2 tablets PO BID and dulcolax suppository daily  Patient verbalized understanding that there would be an additional 2 medications sent to pharmacy

## 2019-07-17 NOTE — TELEPHONE ENCOUNTER
Pt called office  reprots constipation  No BM x 1 week  Last BM small and hard   Has some bloating, denies nausea and is able to eat    Taking Miralax daily  No other stool softeners or laxatives  Taking her pian medications as ordered  Pt has appointment this Friday 07/19/19 with Dr Flores Plants  Please advise

## 2019-07-17 NOTE — TELEPHONE ENCOUNTER
Patient called- still has not had a bowel movement - could you send something to the pharmacy for this patient?

## 2019-07-18 ENCOUNTER — OFFICE VISIT (OUTPATIENT)
Dept: UROLOGY | Facility: AMBULATORY SURGERY CENTER | Age: 64
End: 2019-07-18
Payer: COMMERCIAL

## 2019-07-18 VITALS
HEART RATE: 72 BPM | WEIGHT: 222 LBS | BODY MASS INDEX: 44.76 KG/M2 | SYSTOLIC BLOOD PRESSURE: 140 MMHG | HEIGHT: 59 IN | DIASTOLIC BLOOD PRESSURE: 90 MMHG

## 2019-07-18 DIAGNOSIS — N13.39 OTHER HYDRONEPHROSIS: Primary | ICD-10-CM

## 2019-07-18 PROCEDURE — 99214 OFFICE O/P EST MOD 30 MIN: CPT | Performed by: UROLOGY

## 2019-07-18 NOTE — PROGRESS NOTES
Assessment/Plan:    Other hydronephrosis  I had a lengthy discussion with the patient  We reviewed her imaging  Her right ureter seems to be completely in case by this large pelvic mass  We discussed treatment options including nephrostomy tube placement versus stenting versus observation  Given the fact that he is to undergo chemotherapy I think it is important to preserve her renal function and that observation would not be recommended  Based of the appearance on imaging I think it is unlikely that a stent would relieve her obstruction given the level of extrinsic compression  My recommendation was to proceed with nephrostomy tube placement  The patient is getting a port placed by interventional Radiology soon and hopefully they can do both procedures on the same day  I will place the consult  All of her questions were answered  Diagnoses and all orders for this visit:    Other hydronephrosis  -     IR consult; Future          Total visit time was 25 minutes of which over 50% was spent on counseling  Subjective:     Patient ID: Keith Kaiser is a 66 y o  female    79-year-old female presents for follow-up  The patient has a history of overactive bladder and was undergoing percutaneous tibial nerve stimulation treatments  She was recently hospitalized and a CT scan demonstrated recurrence of endometrial cancer with a large pelvic mass on the right causing obstruction of the right ureter and subsequent hydronephrosis  During hospitalization the obstruction of the kidney was not relieved  The patient now presents for outpatient follow-up regarding this hydronephrosis  The following portions of the patient's history were reviewed and updated as appropriate: allergies, current medications, past family history, past medical history, past social history, past surgical history and problem list      Review of Systems   Constitutional: Negative  HENT: Negative  Eyes: Negative  Respiratory: Negative  Cardiovascular: Negative  Gastrointestinal: Negative  Endocrine: Negative  Genitourinary:        As noted per HPI   Musculoskeletal: Negative  Skin: Negative  Allergic/Immunologic: Negative  Neurological: Negative  Hematological: Negative  Psychiatric/Behavioral: Negative  Objective:    Physical Exam   Constitutional: She is oriented to person, place, and time  She appears well-developed and well-nourished  HENT:   Head: Normocephalic and atraumatic  Neck: Normal range of motion  Neck supple  Cardiovascular: Intact distal pulses  Pulmonary/Chest: Effort normal    Abdominal: Soft  Bowel sounds are normal  She exhibits no distension and no mass  There is no tenderness  There is no rebound and no guarding  Musculoskeletal: Normal range of motion  Neurological: She is alert and oriented to person, place, and time  Skin: Skin is warm and dry  Psychiatric: She has a normal mood and affect  Vitals reviewed          Results  No results found for: PSA  Lab Results   Component Value Date    GLUCOSE 219 (H) 12/19/2017    CALCIUM 9 4 07/09/2019     10/27/2017    K 4 1 07/09/2019    CO2 28 07/09/2019     07/09/2019    BUN 12 07/09/2019    CREATININE 0 77 07/09/2019     Lab Results   Component Value Date    WBC 12 90 (H) 07/09/2019    HGB 11 0 (L) 07/09/2019    HCT 34 9 07/09/2019    MCV 88 07/09/2019     (H) 07/09/2019       No results found for this or any previous visit (from the past 1 hour(s)) ]

## 2019-07-18 NOTE — ASSESSMENT & PLAN NOTE
I had a lengthy discussion with the patient  We reviewed her imaging  Her right ureter seems to be completely in case by this large pelvic mass  We discussed treatment options including nephrostomy tube placement versus stenting versus observation  Given the fact that he is to undergo chemotherapy I think it is important to preserve her renal function and that observation would not be recommended  Based of the appearance on imaging I think it is unlikely that a stent would relieve her obstruction given the level of extrinsic compression  My recommendation was to proceed with nephrostomy tube placement  The patient is getting a port placed by interventional Radiology soon and hopefully they can do both procedures on the same day  I will place the consult  All of her questions were answered

## 2019-07-18 NOTE — H&P (VIEW-ONLY)
Assessment/Plan:    Other hydronephrosis  I had a lengthy discussion with the patient  We reviewed her imaging  Her right ureter seems to be completely in case by this large pelvic mass  We discussed treatment options including nephrostomy tube placement versus stenting versus observation  Given the fact that he is to undergo chemotherapy I think it is important to preserve her renal function and that observation would not be recommended  Based of the appearance on imaging I think it is unlikely that a stent would relieve her obstruction given the level of extrinsic compression  My recommendation was to proceed with nephrostomy tube placement  The patient is getting a port placed by interventional Radiology soon and hopefully they can do both procedures on the same day  I will place the consult  All of her questions were answered  Diagnoses and all orders for this visit:    Other hydronephrosis  -     IR consult; Future          Total visit time was 25 minutes of which over 50% was spent on counseling  Subjective:     Patient ID: Bart Vázquez is a 66 y o  female    62-year-old female presents for follow-up  The patient has a history of overactive bladder and was undergoing percutaneous tibial nerve stimulation treatments  She was recently hospitalized and a CT scan demonstrated recurrence of endometrial cancer with a large pelvic mass on the right causing obstruction of the right ureter and subsequent hydronephrosis  During hospitalization the obstruction of the kidney was not relieved  The patient now presents for outpatient follow-up regarding this hydronephrosis  The following portions of the patient's history were reviewed and updated as appropriate: allergies, current medications, past family history, past medical history, past social history, past surgical history and problem list      Review of Systems   Constitutional: Negative  HENT: Negative  Eyes: Negative  Respiratory: Negative  Cardiovascular: Negative  Gastrointestinal: Negative  Endocrine: Negative  Genitourinary:        As noted per HPI   Musculoskeletal: Negative  Skin: Negative  Allergic/Immunologic: Negative  Neurological: Negative  Hematological: Negative  Psychiatric/Behavioral: Negative  Objective:    Physical Exam   Constitutional: She is oriented to person, place, and time  She appears well-developed and well-nourished  HENT:   Head: Normocephalic and atraumatic  Neck: Normal range of motion  Neck supple  Cardiovascular: Intact distal pulses  Pulmonary/Chest: Effort normal    Abdominal: Soft  Bowel sounds are normal  She exhibits no distension and no mass  There is no tenderness  There is no rebound and no guarding  Musculoskeletal: Normal range of motion  Neurological: She is alert and oriented to person, place, and time  Skin: Skin is warm and dry  Psychiatric: She has a normal mood and affect  Vitals reviewed          Results  No results found for: PSA  Lab Results   Component Value Date    GLUCOSE 219 (H) 12/19/2017    CALCIUM 9 4 07/09/2019     10/27/2017    K 4 1 07/09/2019    CO2 28 07/09/2019     07/09/2019    BUN 12 07/09/2019    CREATININE 0 77 07/09/2019     Lab Results   Component Value Date    WBC 12 90 (H) 07/09/2019    HGB 11 0 (L) 07/09/2019    HCT 34 9 07/09/2019    MCV 88 07/09/2019     (H) 07/09/2019       No results found for this or any previous visit (from the past 1 hour(s)) ]

## 2019-07-19 ENCOUNTER — OFFICE VISIT (OUTPATIENT)
Dept: PALLIATIVE MEDICINE | Facility: CLINIC | Age: 64
End: 2019-07-19
Payer: COMMERCIAL

## 2019-07-19 ENCOUNTER — PATIENT OUTREACH (OUTPATIENT)
Dept: FAMILY MEDICINE CLINIC | Facility: CLINIC | Age: 64
End: 2019-07-19

## 2019-07-19 ENCOUNTER — TELEPHONE (OUTPATIENT)
Dept: UROLOGY | Facility: MEDICAL CENTER | Age: 64
End: 2019-07-19

## 2019-07-19 VITALS
SYSTOLIC BLOOD PRESSURE: 120 MMHG | WEIGHT: 222.8 LBS | RESPIRATION RATE: 20 BRPM | OXYGEN SATURATION: 98 % | DIASTOLIC BLOOD PRESSURE: 60 MMHG | TEMPERATURE: 97.3 F | BODY MASS INDEX: 45 KG/M2 | HEART RATE: 78 BPM

## 2019-07-19 DIAGNOSIS — C54.1 ENDOMETRIAL CANCER (HCC): Primary | ICD-10-CM

## 2019-07-19 DIAGNOSIS — T40.2X5A THERAPEUTIC OPIOID INDUCED CONSTIPATION: ICD-10-CM

## 2019-07-19 DIAGNOSIS — G89.3 CANCER ASSOCIATED PAIN: ICD-10-CM

## 2019-07-19 DIAGNOSIS — G89.3 CANCER RELATED PAIN: ICD-10-CM

## 2019-07-19 DIAGNOSIS — K59.03 THERAPEUTIC OPIOID INDUCED CONSTIPATION: ICD-10-CM

## 2019-07-19 PROCEDURE — 99214 OFFICE O/P EST MOD 30 MIN: CPT | Performed by: FAMILY MEDICINE

## 2019-07-19 RX ORDER — OXYCODONE HCL 20 MG/1
20 TABLET, FILM COATED, EXTENDED RELEASE ORAL EVERY 8 HOURS SCHEDULED
Qty: 90 TABLET | Refills: 0 | Status: SHIPPED | OUTPATIENT
Start: 2019-07-19 | End: 2019-08-18

## 2019-07-19 RX ORDER — OXYCODONE HYDROCHLORIDE 5 MG/1
5 TABLET ORAL EVERY 4 HOURS PRN
Qty: 90 TABLET | Refills: 0 | Status: SHIPPED | OUTPATIENT
Start: 2019-07-19 | End: 2019-08-18

## 2019-07-19 NOTE — TELEPHONE ENCOUNTER
Patient of Dr John Baptiste seen in the [place] office  Patient called to see if the appointment for her nephrostomy tube was scheduled  She would like to schedule this appointment  if possible on 7/26/19 after her IR Port Placement  Please advise

## 2019-07-19 NOTE — PROGRESS NOTES
Palliative and Supportive Care   Robert Ruelas 66 y o  female [id number]    Assessment/Plan:  1  Endometrial cancer ([facility] [address] )    2  Cancer related pain   -will change oxycodone ER 20mg PO to q8h from q12 h as it seems that the medication effect is wearing off, but dose seems appropriate at this time    -continue roxicodone 5mg PO q4h PRN moderate pain   -discussed adding 2mg decadron daily if needed at next followup appointment as multimodal for pain, energy, anti-inflammatory, nausea  -continue tylenol   -tramadol was discontinued upon hospital discharge and will remain discontinued    3  Therapeutic opioid induced constipation   -current regimen is working for her and she is now having a bowel movement daily    -continue daily miralax, senokot and dulcolax  -discussed that the dulcolax suppository can be PRN constipation if notices that bowel movements become too frequent or develops diarrhea     Patient wants to go on vacation 8/12-8/16  Since chemo starts 7/30, would like to see patient in follow up on 8/2/19 prior to leaving for vacation  Requested Prescriptions     Signed Prescriptions Disp Refills    oxyCODONE (OxyCONTIN) 20 mg 12 hr tablet 90 tablet 0     Sig: Take 1 tablet (20 mg total) by mouth every 8 (eight) hours for 30 daysMax Daily Amount: 60 mg    oxyCODONE (ROXICODONE) 5 mg immediate release tablet 90 tablet 0     Sig: Take 1 tablet (5 mg total) by mouth every 4 (four) hours as needed for severe pain for up to 30 daysMax Daily Amount: 30 mg     Medications Discontinued During This Encounter   Medication Reason    oxyCODONE (OxyCONTIN) 20 mg 12 hr tablet Reorder    oxyCODONE (ROXICODONE) 5 mg immediate release tablet Reorder       Representatives have queried the patient's controlled substance dispensing history in the Prescription Drug Monitoring Program in compliance with regulations before I have prescribed any controlled substances    The prescription history is consistent with prescribed therapy and our practice policies  60 minutes were spent face to face with Livia Adler and her sister with greater than 50% of the time spent in counseling or coordination of care including discussions of etiology of diagnosis, diagnostic results, impression, and recommendations, risks and benefits of treatment, instructions for disease self management, treatment instructions, follow up requirements, patient and family counseling/involvement in care and compliance with treatment regimen   All of the patient's questions were answered during this discussion  Return in about 2 weeks (around 8/2/2019) for Symptom Assessment and Managment, with Dr Madhu Reis  Subjective:   Chief Complaint  Follow up visit for:  symptom management, pain, neoplasm related, assessment of goals of care  HPI     Livia Adler is a 66 y o  female with history of stage 1B endometrial cancer, comprehensively staged in 2017 s/p definitive surgical management now with biopsy proven recurrence in 07/2019 with current recommendation by gyn-onc to start chemotherapy on 7/30 with carboplatin and Taxol q 21 days and obtain repeat imaging after cycle 3  Patient is now scheduled to have port-a-cath placed 7/26  Also patient was diagnosed with acute DVT and is now on Xarelto  Pelvis mass is almost completely obstructing her right ureter for which urology recommends get nephrostomy tube placed by IR at the same time that she is getting the port-a-cath placed if possible  During patient's admission for right sided back, groin and flank pain when she was found to have recurrence of the endometrial cancer, patient was followed by palliative care for pain management  Now, she presents for outpatient follow-up regarding continued symptom management  In the interim since discharge, patient called office and reported that her pain was worsening  I switched patient over to Oxycontin 20mg PO BID and Roxicodone 5mg PO q4h PRN severe pain  Patient also complained of constipation despite taking Miralax daily, thus I started patient on  sonokot 2 tablets PO BID and Dulcolax suppository daily  Patient reports that he pain is constantly a 5 out of 10, but that the current regimen has helped the spikes in the pain significantly improve  Reports that almost everyday her pain worsens around dinner time ~6pm  She does not take the roxicodone 5mg every 4 hours because throughout the day she tries to drive and do things occasionally, as she can, and the roxicodone makes her a tiny bit sleepy  Reports that having the constnat pain is making daily activities difficult and she is tired and cannot concentrate because she is focused on the pain  The following portions of the medical history were reviewed: past medical history, problem list, medication list, and social history      Current Outpatient Medications:     acetaminophen (TYLENOL) 325 mg tablet, Take 3 tablets (975 mg total) by mouth every 8 (eight) hours for 14 days, Disp: 126 tablet, Rfl: 0    ARIPiprazole (ABILIFY) 10 mg tablet, Take 10 mg by mouth daily, Disp: , Rfl:     aspirin (ECOTRIN LOW STRENGTH) 81 mg EC tablet, Take 81 mg by mouth daily, Disp: , Rfl:     bisacodyl (DULCOLAX) 10 mg suppository, Insert 1 suppository (10 mg total) into the rectum daily, Disp: 12 suppository, Rfl: 0    cholecalciferol (VITAMIN D3) 1,000 units tablet, Take 1,000 Units by mouth daily, Disp: , Rfl:     dexmethylphenidate (FOCALIN XR) 10 MG 24 hr capsule, Take 10 mg by mouth daily, Disp: , Rfl:     Ferrous Sulfate (IRON) 28 MG TABS, Take by mouth, Disp: , Rfl:     furosemide (LASIX) 40 mg tablet, Take 1 tablet (40 mg total) by mouth 2 (two) times a day, Disp: 30 tablet, Rfl: 0    gemfibrozil (LOPID) 600 mg tablet, TAKE 1 TABLET BY MOUTH DAILY, Disp: 90 tablet, Rfl: 1    lidocaine (LMX) 4 % cream, Apply topically as needed for mild pain for up to 14 days Apply topically to the right low back for mild pain/stiffness, Disp: 30 g, Rfl: 0    LORazepam (ATIVAN) 1 mg tablet, Take 1 tablet (1 mg total) by mouth every 6 (six) hours as needed for anxiety (or nausea), Disp: 36 tablet, Rfl: 1    melatonin 3 mg, Take 1 tablet (3 mg total) by mouth daily at bedtime for 15 days, Disp: 15 tablet, Rfl: 0    metFORMIN (GLUCOPHAGE) 500 mg tablet, Take 1 tablet (500 mg total) by mouth daily, Disp: 180 tablet, Rfl: 1    MYRBETRIQ 50 MG TB24, TAKE 1 TABLET DAILY, Disp: 30 tablet, Rfl: 10    omeprazole (PriLOSEC) 20 mg delayed release capsule, Take 20 mg by mouth daily, Disp: , Rfl:     ondansetron (ZOFRAN) 8 mg tablet, Take 1 tablet (8 mg total) by mouth every 8 (eight) hours as needed for nausea or vomiting, Disp: 30 tablet, Rfl: 1    oxybutynin (DITROPAN-XL) 10 MG 24 hr tablet, TAKE 1 TABLET BY MOUTH EVERY DAY, Disp: 30 tablet, Rfl: 11    oxyCODONE (OxyCONTIN) 20 mg 12 hr tablet, Take 1 tablet (20 mg total) by mouth every 8 (eight) hours for 30 daysMax Daily Amount: 60 mg, Disp: 90 tablet, Rfl: 0    oxyCODONE (ROXICODONE) 5 mg immediate release tablet, Take 1 tablet (5 mg total) by mouth every 4 (four) hours as needed for severe pain for up to 30 daysMax Daily Amount: 30 mg, Disp: 90 tablet, Rfl: 0    polyethylene glycol (MIRALAX) 17 g packet, Take 17 g by mouth daily as needed (as needed for constipation), Disp: 14 each, Rfl: 0    potassium chloride (K-DUR,KLOR-CON) 20 mEq tablet, Take 1 tablet (20 mEq total) by mouth 2 (two) times a day, Disp: 30 tablet, Rfl: 0    quinapril (ACCUPRIL) 5 mg tablet, TAKE 1 TABLET BY MOUTH DAILY AT BEDTIME, Disp: 90 tablet, Rfl: 1    rivaroxaban (XARELTO) 20 mg tablet, Take 1 tablet (20 mg total) by mouth daily with breakfast, Disp: 30 tablet, Rfl: 4    senna (SENOKOT) 8 6 MG tablet, Take 2 tablets (17 2 mg total) by mouth 2 (two) times a day for 14 days, Disp: 56 tablet, Rfl: 0    venlafaxine (EFFEXOR) 75 mg tablet, Take 75 mg by mouth 3 (three) times a day  , Disp: , Rfl:    rivaroxaban (XARELTO) 15 & 20 MG starter pack, 15 mg bid x 21 days then 20 mg daily (Patient not taking: Reported on 7/18/2019), Disp: 1 Package, Rfl: 0    traMADol (ULTRAM) 50 mg tablet, Take 1 tablet (50 mg total) by mouth daily at bedtime as needed for moderate pain, Disp: 30 tablet, Rfl: 0  Review of Systems   Constitutional: Positive for fatigue  Negative for chills and fever  HENT: Negative for congestion and rhinorrhea  Eyes: Negative for photophobia and visual disturbance  Respiratory: Negative for chest tightness and shortness of breath  Cardiovascular: Negative for chest pain and palpitations  Gastrointestinal: Positive for abdominal pain and constipation  Negative for abdominal distention, blood in stool, diarrhea, nausea and vomiting  Genitourinary: Positive for flank pain  Negative for dysuria and hematuria  Musculoskeletal: Positive for back pain  Negative for neck pain  Skin: Negative for rash and wound  Neurological: Negative for dizziness, weakness, light-headedness, numbness and headaches  All other systems negative    Objective:  Vital Signs  /60 (BP Location: Left arm, Patient Position: Sitting, Cuff Size: Standard)   Pulse 78   Temp (!) 97 3 °F (36 3 °C) (Oral)   Resp 20   Wt 101 kg (222 lb 12 8 oz)   SpO2 98%   BMI 45 00 kg/m²    Physical Exam   Constitutional: She is oriented to person, place, and time  She appears well-developed and well-nourished  No distress  HENT:   Head: Normocephalic and atraumatic  Mouth/Throat: Oropharynx is clear and moist    Eyes: Pupils are equal, round, and reactive to light  Conjunctivae and EOM are normal    Neck: Normal range of motion  Neck supple  Cardiovascular: Normal rate, regular rhythm and normal heart sounds  Exam reveals no gallop and no friction rub  No murmur heard  Pulmonary/Chest: Effort normal and breath sounds normal  No stridor  No respiratory distress  She has no wheezes  She has no rales   She exhibits no tenderness  Abdominal: Soft  Bowel sounds are normal  She exhibits no distension  There is tenderness (tender in the RLQ and right flank)  There is no rebound and no guarding  Musculoskeletal: Normal range of motion  She exhibits edema (1+ BL LE edema)  Tender over the right paraspinal musculature   Neurological: She is alert and oriented to person, place, and time  Skin: Skin is warm and dry  She is not diaphoretic  Psychiatric: Her behavior is normal    Slightly flat affect   Nursing note and vitals reviewed

## 2019-07-19 NOTE — PROGRESS NOTES
Second call  Patient answered and was receptive to post-hospital visit with PCP  Care management services were explained to patient  Patient consented to care management  Patient has immediate need for PCP visit  Message sent to PCP's office  Patient also needs refill on lasix  In basket message to request refill  Patient was not able to complete assessment today  She asked that care  back on Wed or Thurs

## 2019-07-19 NOTE — TELEPHONE ENCOUNTER
Confirmed on appointment desk that Nephrostomy tube placement is scheduled immediately after port placement on 7/26/19  I called patient and informed her of this

## 2019-07-22 ENCOUNTER — OFFICE VISIT (OUTPATIENT)
Dept: FAMILY MEDICINE CLINIC | Facility: CLINIC | Age: 64
End: 2019-07-22
Payer: COMMERCIAL

## 2019-07-22 VITALS
OXYGEN SATURATION: 98 % | HEIGHT: 59 IN | BODY MASS INDEX: 44.55 KG/M2 | TEMPERATURE: 97.9 F | SYSTOLIC BLOOD PRESSURE: 98 MMHG | WEIGHT: 221 LBS | DIASTOLIC BLOOD PRESSURE: 60 MMHG | RESPIRATION RATE: 16 BRPM | HEART RATE: 80 BPM

## 2019-07-22 DIAGNOSIS — N13.39 OTHER HYDRONEPHROSIS: ICD-10-CM

## 2019-07-22 DIAGNOSIS — E78.5 DYSLIPIDEMIA: ICD-10-CM

## 2019-07-22 DIAGNOSIS — C54.1 ENDOMETRIAL CANCER (HCC): Primary | Chronic | ICD-10-CM

## 2019-07-22 DIAGNOSIS — F32.9 MAJOR DEPRESSION, CHRONIC: ICD-10-CM

## 2019-07-22 DIAGNOSIS — R73.03 PREDIABETES: ICD-10-CM

## 2019-07-22 DIAGNOSIS — E66.01 CLASS 3 SEVERE OBESITY DUE TO EXCESS CALORIES WITHOUT SERIOUS COMORBIDITY WITH BODY MASS INDEX (BMI) OF 45.0 TO 49.9 IN ADULT (HCC): ICD-10-CM

## 2019-07-22 DIAGNOSIS — M79.89 SWOLLEN LEG: ICD-10-CM

## 2019-07-22 PROCEDURE — 99496 TRANSJ CARE MGMT HIGH F2F 7D: CPT | Performed by: INTERNAL MEDICINE

## 2019-07-22 RX ORDER — FUROSEMIDE 40 MG/1
40 TABLET ORAL 2 TIMES DAILY
Qty: 180 TABLET | Refills: 1 | Status: SHIPPED | OUTPATIENT
Start: 2019-07-22 | End: 2019-08-28

## 2019-07-22 RX ORDER — POTASSIUM CHLORIDE 20 MEQ/1
20 TABLET, EXTENDED RELEASE ORAL 2 TIMES DAILY
Qty: 180 TABLET | Refills: 1 | Status: SHIPPED | OUTPATIENT
Start: 2019-07-22 | End: 2019-09-04

## 2019-07-22 NOTE — PROGRESS NOTES
Assessment/Plan:    TCM Call (since 6/21/2019)     Date and time call was made  7/10/2019 11:10 AM    Patient was hospitialized at  [facility]    Date of Admission  07/07/19    Date of discharge  07/09/19    Diagnosis  DVT - pelvic mass    Disposition  Home      TCM Call (since 6/21/2019)     Scheduled for follow up? Patient Refused    Patient refusal reason  Patient following with Oncology  If biopsy turns out to not be cancerous, patient will follow with us  Did you obtain your prescribed medications  Yes    Do you need help managing your prescriptions or medications  No    Is transportation to your appointment needed  No    I have advised the patient to call PCP with any new or worsening symptoms  Abdirizak Negrete MA    Are you recieving any outpatient services  No    Are you recieving home care services  No    Are you using any community resources  No    Current waiver services  No    Have you fallen in the last 12 months  No    Interperter language line needed  No    Counseling  Patient    Counseling topics  Importance of RX compliance        Patient was admitted 07/07  she was found to have a pelvic mass on CT scan  This was done by gyn Oncology  She relates that she is to have a nephrostomy tube eventually placed  She states she has a recurrence of her endometrial cancer and is to start chemotherapy 1st followed by radiation therapy  Diagnoses and all orders for this visit:    Swollen leg  Comments:  r/o dvt  Orders:  -     potassium chloride (K-DUR,KLOR-CON) 20 mEq tablet; Take 1 tablet (20 mEq total) by mouth 2 (two) times a day  -     furosemide (LASIX) 40 mg tablet; Take 1 tablet (40 mg total) by mouth 2 (two) times a day          Subjective:      Patient ID: Guanaco Porter is a 66 y o  female  Patient was admitted 7/7/19 and discharged 7/9/19 she was admitted to gyn Oncology  She was found to have a mass on CT scan  She relates this is a recurrence of her endometrial cancer  She is going have an upcoming nephrostomy tube placed  She is to have start chemo and this will be followed by radiation therapy  She is following with palliative tx for her  Pain relief  The following portions of the patient's history were reviewed and updated as appropriate: She  has a past medical history of Anemia, Depression, Diabetes mellitus ([facility] [address] ), [facility] ) (12/2017), Hyperglycemia, Hyperlipidemia, Hypertension, and Obesity  She   Patient Active Problem List    Diagnosis Date Noted    Cancer related pain 07/19/2019    Therapeutic opioid induced constipation 07/19/2019    Other hydronephrosis 07/16/2019    Encounter for central line care 07/16/2019    Acute deep vein thrombosis (DVT) of proximal vein of lower extremity ([facility] ) 06/19/2019    Breast cancer screening 06/19/2019    Benign essential hypertension 12/19/2017    Class 3 severe obesity with body mass index (BMI) of 45 0 to 49 9 in [facility]) 12/19/2017    Prediabetes 12/19/2017    Endometrial cancer ([facility] ) 11/29/2017    Dyslipidemia 04/01/2014    Major depression, chronic 10/07/2013     She  has a past surgical history that includes Abdominal surgery; Colonoscopy; pr lap, radical hyst w/ tube&ov, node bx (N/A, 12/19/2017); pr lap,diagnostic abdomen (N/A, 12/19/2017); Cholecystectomy; Gastric bypass; Tonsillectomy; Hysterectomy (Bilateral); Oophorectomy (Bilateral); US guided breast biopsy right complete (Right, 6/28/2019); and CT needle biopsy lymph node (7/8/2019)  Her family history includes Bone cancer in her maternal grandfather; Lymphoma in her father; No Known Problems in her brother, brother, maternal aunt, maternal aunt, maternal aunt, maternal aunt, maternal grandmother, paternal aunt, paternal aunt, paternal aunt, paternal aunt, paternal grandfather, paternal grandmother, and sister; Other in her mother; Ovarian cancer (age of onset: 48) in her mother  She  reports that she has never smoked   She has never used smokeless tobacco  She reports that she drank alcohol  She reports that she does not use drugs    Current Outpatient Medications   Medication Sig Dispense Refill    acetaminophen (TYLENOL) 325 mg tablet Take 3 tablets (975 mg total) by mouth every 8 (eight) hours for 14 days 126 tablet 0    ARIPiprazole (ABILIFY) 10 mg tablet Take 10 mg by mouth daily      aspirin (ECOTRIN LOW STRENGTH) 81 mg EC tablet Take 81 mg by mouth daily      bisacodyl (DULCOLAX) 10 mg suppository Insert 1 suppository (10 mg total) into the rectum daily 12 suppository 0    cholecalciferol (VITAMIN D3) 1,000 units tablet Take 1,000 Units by mouth daily      dexmethylphenidate (FOCALIN XR) 10 MG 24 hr capsule Take 10 mg by mouth daily      Ferrous Sulfate (IRON) 28 MG TABS Take by mouth      furosemide (LASIX) 40 mg tablet Take 1 tablet (40 mg total) by mouth 2 (two) times a day 180 tablet 1    gemfibrozil (LOPID) 600 mg tablet TAKE 1 TABLET BY MOUTH DAILY 90 tablet 1    lidocaine (LMX) 4 % cream Apply topically as needed for mild pain for up to 14 days Apply topically to the right low back for mild pain/stiffness 30 g 0    LORazepam (ATIVAN) 1 mg tablet Take 1 tablet (1 mg total) by mouth every 6 (six) hours as needed for anxiety (or nausea) 36 tablet 1    melatonin 3 mg Take 1 tablet (3 mg total) by mouth daily at bedtime for 15 days 15 tablet 0    metFORMIN (GLUCOPHAGE) 500 mg tablet Take 1 tablet (500 mg total) by mouth daily 180 tablet 1    MYRBETRIQ 50 MG TB24 TAKE 1 TABLET DAILY 30 tablet 10    omeprazole (PriLOSEC) 20 mg delayed release capsule Take 20 mg by mouth daily      ondansetron (ZOFRAN) 8 mg tablet Take 1 tablet (8 mg total) by mouth every 8 (eight) hours as needed for nausea or vomiting 30 tablet 1    oxybutynin (DITROPAN-XL) 10 MG 24 hr tablet TAKE 1 TABLET BY MOUTH EVERY DAY 30 tablet 11    oxyCODONE (OxyCONTIN) 20 mg 12 hr tablet Take 1 tablet (20 mg total) by mouth every 8 (eight) hours for 30 daysMax Daily Amount: 60 mg 90 tablet 0    oxyCODONE (ROXICODONE) 5 mg immediate release tablet Take 1 tablet (5 mg total) by mouth every 4 (four) hours as needed for severe pain for up to 30 daysMax Daily Amount: 30 mg 90 tablet 0    polyethylene glycol (MIRALAX) 17 g packet Take 17 g by mouth daily as needed (as needed for constipation) 14 each 0    potassium chloride (K-DUR,KLOR-CON) 20 mEq tablet Take 1 tablet (20 mEq total) by mouth 2 (two) times a day 180 tablet 1    quinapril (ACCUPRIL) 5 mg tablet TAKE 1 TABLET BY MOUTH DAILY AT BEDTIME 90 tablet 1    rivaroxaban (XARELTO) 20 mg tablet Take 1 tablet (20 mg total) by mouth daily with breakfast 30 tablet 4    senna (SENOKOT) 8 6 MG tablet Take 2 tablets (17 2 mg total) by mouth 2 (two) times a day for 14 days 56 tablet 0    venlafaxine (EFFEXOR) 75 mg tablet Take 75 mg by mouth 3 (three) times a day         No current facility-administered medications for this visit        Current Outpatient Medications on File Prior to Visit   Medication Sig    acetaminophen (TYLENOL) 325 mg tablet Take 3 tablets (975 mg total) by mouth every 8 (eight) hours for 14 days    ARIPiprazole (ABILIFY) 10 mg tablet Take 10 mg by mouth daily    aspirin (ECOTRIN LOW STRENGTH) 81 mg EC tablet Take 81 mg by mouth daily    bisacodyl (DULCOLAX) 10 mg suppository Insert 1 suppository (10 mg total) into the rectum daily    cholecalciferol (VITAMIN D3) 1,000 units tablet Take 1,000 Units by mouth daily    dexmethylphenidate (FOCALIN XR) 10 MG 24 hr capsule Take 10 mg by mouth daily    Ferrous Sulfate (IRON) 28 MG TABS Take by mouth    gemfibrozil (LOPID) 600 mg tablet TAKE 1 TABLET BY MOUTH DAILY    lidocaine (LMX) 4 % cream Apply topically as needed for mild pain for up to 14 days Apply topically to the right low back for mild pain/stiffness    LORazepam (ATIVAN) 1 mg tablet Take 1 tablet (1 mg total) by mouth every 6 (six) hours as needed for anxiety (or nausea)    melatonin 3 mg Take 1 tablet (3 mg total) by mouth daily at bedtime for 15 days    metFORMIN (GLUCOPHAGE) 500 mg tablet Take 1 tablet (500 mg total) by mouth daily    MYRBETRIQ 50 MG TB24 TAKE 1 TABLET DAILY    omeprazole (PriLOSEC) 20 mg delayed release capsule Take 20 mg by mouth daily    ondansetron (ZOFRAN) 8 mg tablet Take 1 tablet (8 mg total) by mouth every 8 (eight) hours as needed for nausea or vomiting    oxybutynin (DITROPAN-XL) 10 MG 24 hr tablet TAKE 1 TABLET BY MOUTH EVERY DAY    oxyCODONE (OxyCONTIN) 20 mg 12 hr tablet Take 1 tablet (20 mg total) by mouth every 8 (eight) hours for 30 daysMax Daily Amount: 60 mg    oxyCODONE (ROXICODONE) 5 mg immediate release tablet Take 1 tablet (5 mg total) by mouth every 4 (four) hours as needed for severe pain for up to 30 daysMax Daily Amount: 30 mg    polyethylene glycol (MIRALAX) 17 g packet Take 17 g by mouth daily as needed (as needed for constipation)    quinapril (ACCUPRIL) 5 mg tablet TAKE 1 TABLET BY MOUTH DAILY AT BEDTIME    rivaroxaban (XARELTO) 20 mg tablet Take 1 tablet (20 mg total) by mouth daily with breakfast    senna (SENOKOT) 8 6 MG tablet Take 2 tablets (17 2 mg total) by mouth 2 (two) times a day for 14 days    venlafaxine (EFFEXOR) 75 mg tablet Take 75 mg by mouth 3 (three) times a day      [DISCONTINUED] furosemide (LASIX) 40 mg tablet Take 1 tablet (40 mg total) by mouth 2 (two) times a day    [DISCONTINUED] potassium chloride (K-DUR,KLOR-CON) 20 mEq tablet Take 1 tablet (20 mEq total) by mouth 2 (two) times a day    [DISCONTINUED] rivaroxaban (XARELTO) 15 & 20 MG starter pack 15 mg bid x 21 days then 20 mg daily (Patient not taking: Reported on 7/18/2019)    [DISCONTINUED] traMADol (ULTRAM) 50 mg tablet Take 1 tablet (50 mg total) by mouth daily at bedtime as needed for moderate pain     No current facility-administered medications on file prior to visit  She is allergic to cephalosporins       Review of Systems Constitutional: Negative  Negative for chills and fever  HENT: Negative  Respiratory: Negative  Cardiovascular: Negative  Objective:      BP 98/60 (BP Location: Left arm, Patient Position: Sitting, Cuff Size: Large)   Pulse 80   Temp 97 9 °F (36 6 °C) (Tympanic)   Resp 16   Ht 4' 11" (1 499 m)   Wt 100 kg (221 lb)   SpO2 98%   BMI 44 64 kg/m²          Physical Exam   Constitutional: She appears well-developed and well-nourished  No distress  HENT:   Head: Normocephalic  Right Ear: External ear normal    Left Ear: External ear normal    Nose: Nose normal    Mouth/Throat: Oropharynx is clear and moist  No oropharyngeal exudate  Neck: Normal range of motion  Neck supple  No JVD present  No thyromegaly present  Cardiovascular: Normal rate, regular rhythm, normal heart sounds and intact distal pulses  Exam reveals no gallop and no friction rub  No murmur heard  Pulmonary/Chest: Effort normal and breath sounds normal  No stridor  No respiratory distress  She has no wheezes  She has no rales  She exhibits no tenderness  Lymphadenopathy:     She has no cervical adenopathy  Skin: She is not diaphoretic

## 2019-07-24 ENCOUNTER — TELEPHONE (OUTPATIENT)
Dept: PALLIATIVE MEDICINE | Facility: CLINIC | Age: 64
End: 2019-07-24

## 2019-07-24 DIAGNOSIS — T40.2X5A THERAPEUTIC OPIOID INDUCED CONSTIPATION: Primary | ICD-10-CM

## 2019-07-24 DIAGNOSIS — K59.03 THERAPEUTIC OPIOID INDUCED CONSTIPATION: Primary | ICD-10-CM

## 2019-07-24 RX ORDER — LACTULOSE 20 G/30ML
10 SOLUTION ORAL 3 TIMES DAILY
Qty: 946 ML | Refills: 0 | Status: SHIPPED | OUTPATIENT
Start: 2019-07-24 | End: 2019-09-29

## 2019-07-24 NOTE — TELEPHONE ENCOUNTER
Pain management:   As patient describes better overall baseline pain control and states that the pain spikes with exertion/ movement, will have patient increase dose of the oxycodone IR 5mg q4h PRN to 10mg q4h PRN and monitor if that is more useful  Keep oxycodone ER the same  Constipation:   Patient is taking 17g miralax, 2 tabs of senna BID and dulcolax suppository daily and reports no BM x 4 days  Instructed patient to STOP taking miralax and dulcolax  Start taking lactulose TID until she becomes regular, then PRN  Also counseled that if she developed significant abdominal cramping, that she should stop taking the senna and only keep the lactulose  Discussed with patient that if she develops abdominal pain, abdominal distention, stops passing gas, starts having feculent vomiting or a fever that she should present to the emergency department  Instructed patient to call back if the changes to her regimen are not helping

## 2019-07-24 NOTE — TELEPHONE ENCOUNTER
Patient called stating shes having pain issues and also has not had a bowel movement since Sunday   Dr Betty Medina would like a Nurse call to check if patient is taking all her constipation helps and her pain meds as prescribed  and give her an update

## 2019-07-24 NOTE — TELEPHONE ENCOUNTER
This nurse called patient regarding her symptoms of pain and constipation  She is currently taking the oxycodone 20mg every 8 hours and has breakthrough pain every 4 hours has needed the oxycodone 5mg  She still has difficulty getting up to ambulate and go to the bathroom with her pain level of 8 with exertion  She has had no BM for 4 days and feels as if she needs to have a BM but can not  This nurse reviewed all her bowel medications and she is taking them all as ordered and has tried a suppository each night for BM with no result  Please advise   [employer]

## 2019-07-26 ENCOUNTER — ANESTHESIA EVENT (OUTPATIENT)
Dept: RADIOLOGY | Facility: HOSPITAL | Age: 64
End: 2019-07-26

## 2019-07-26 ENCOUNTER — TELEPHONE (OUTPATIENT)
Dept: UROLOGY | Facility: AMBULATORY SURGERY CENTER | Age: 64
End: 2019-07-26

## 2019-07-26 ENCOUNTER — HOSPITAL ENCOUNTER (OUTPATIENT)
Dept: RADIOLOGY | Facility: HOSPITAL | Age: 64
Discharge: HOME | End: 2019-07-26
Attending: UROLOGY
Payer: COMMERCIAL

## 2019-07-26 ENCOUNTER — ANESTHESIA (OUTPATIENT)
Dept: RADIOLOGY | Facility: HOSPITAL | Age: 64
End: 2019-07-26

## 2019-07-26 ENCOUNTER — HOSPITAL ENCOUNTER (OUTPATIENT)
Dept: RADIOLOGY | Facility: HOSPITAL | Age: 64
Discharge: HOME | End: 2019-07-26
Attending: OBSTETRICS & GYNECOLOGY | Admitting: RADIOLOGY
Payer: COMMERCIAL

## 2019-07-26 VITALS
HEIGHT: 59 IN | OXYGEN SATURATION: 95 % | SYSTOLIC BLOOD PRESSURE: 112 MMHG | DIASTOLIC BLOOD PRESSURE: 54 MMHG | WEIGHT: 221 LBS | RESPIRATION RATE: 16 BRPM | HEART RATE: 81 BPM | BODY MASS INDEX: 44.55 KG/M2 | TEMPERATURE: 99.3 F

## 2019-07-26 DIAGNOSIS — C54.1 ENDOMETRIAL CANCER (HCC): Primary | Chronic | ICD-10-CM

## 2019-07-26 DIAGNOSIS — C54.1 ENDOMETRIAL CANCER (HCC): ICD-10-CM

## 2019-07-26 DIAGNOSIS — N13.39 OTHER HYDRONEPHROSIS: ICD-10-CM

## 2019-07-26 LAB
ALBUMIN SERPL BCP-MCNC: 2.8 G/DL (ref 3.5–5)
ALP SERPL-CCNC: 109 U/L (ref 46–116)
ALT SERPL W P-5'-P-CCNC: 19 U/L (ref 12–78)
ANION GAP SERPL CALCULATED.3IONS-SCNC: 14 MMOL/L (ref 4–13)
AST SERPL W P-5'-P-CCNC: 13 U/L (ref 5–45)
BASOPHILS # BLD AUTO: 0.05 THOUSANDS/ΜL (ref 0–0.1)
BASOPHILS NFR BLD AUTO: 0 % (ref 0–1)
BILIRUB SERPL-MCNC: 0.2 MG/DL (ref 0.2–1)
BUN SERPL-MCNC: 11 MG/DL (ref 5–25)
CALCIUM SERPL-MCNC: 8.5 MG/DL (ref 8.3–10.1)
CHLORIDE SERPL-SCNC: 98 MMOL/L (ref 100–108)
CO2 SERPL-SCNC: 26 MMOL/L (ref 21–32)
CREAT SERPL-MCNC: 0.92 MG/DL (ref 0.6–1.3)
EOSINOPHIL # BLD AUTO: 0.05 THOUSAND/ΜL (ref 0–0.61)
EOSINOPHIL NFR BLD AUTO: 0 % (ref 0–6)
ERYTHROCYTE [DISTWIDTH] IN BLOOD BY AUTOMATED COUNT: 13.5 % (ref 11.6–15.1)
GFR SERPL CREATININE-BSD FRML MDRD: 67 ML/MIN/1.73SQ M
GLUCOSE P FAST SERPL-MCNC: 145 MG/DL (ref 65–99)
GLUCOSE SERPL-MCNC: 142 MG/DL (ref 65–140)
GLUCOSE SERPL-MCNC: 145 MG/DL (ref 65–140)
HCT VFR BLD AUTO: 29.2 % (ref 34.8–46.1)
HGB BLD-MCNC: 9.3 G/DL (ref 11.5–15.4)
IMM GRANULOCYTES # BLD AUTO: 0.1 THOUSAND/UL (ref 0–0.2)
IMM GRANULOCYTES NFR BLD AUTO: 1 % (ref 0–2)
LYMPHOCYTES # BLD AUTO: 2.45 THOUSANDS/ΜL (ref 0.6–4.47)
LYMPHOCYTES NFR BLD AUTO: 18 % (ref 14–44)
MAGNESIUM SERPL-MCNC: 1.5 MG/DL (ref 1.6–2.6)
MCH RBC QN AUTO: 28.2 PG (ref 26.8–34.3)
MCHC RBC AUTO-ENTMCNC: 31.8 G/DL (ref 31.4–37.4)
MCV RBC AUTO: 89 FL (ref 82–98)
MONOCYTES # BLD AUTO: 1.49 THOUSAND/ΜL (ref 0.17–1.22)
MONOCYTES NFR BLD AUTO: 11 % (ref 4–12)
NEUTROPHILS # BLD AUTO: 9.4 THOUSANDS/ΜL (ref 1.85–7.62)
NEUTS SEG NFR BLD AUTO: 70 % (ref 43–75)
NRBC BLD AUTO-RTO: 0 /100 WBCS
PLATELET # BLD AUTO: 449 THOUSANDS/UL (ref 149–390)
PMV BLD AUTO: 9.3 FL (ref 8.9–12.7)
POTASSIUM SERPL-SCNC: 3 MMOL/L (ref 3.5–5.3)
PROT SERPL-MCNC: 6.8 G/DL (ref 6.4–8.2)
RBC # BLD AUTO: 3.3 MILLION/UL (ref 3.81–5.12)
SODIUM SERPL-SCNC: 138 MMOL/L (ref 136–145)
WBC # BLD AUTO: 13.54 THOUSAND/UL (ref 4.31–10.16)

## 2019-07-26 PROCEDURE — 76937 US GUIDE VASCULAR ACCESS: CPT

## 2019-07-26 PROCEDURE — 85025 COMPLETE CBC W/AUTO DIFF WBC: CPT | Performed by: PHYSICIAN ASSISTANT

## 2019-07-26 PROCEDURE — 82948 REAGENT STRIP/BLOOD GLUCOSE: CPT

## 2019-07-26 PROCEDURE — C2617 STENT, NON-COR, TEM W/O DEL: HCPCS

## 2019-07-26 PROCEDURE — 77001 FLUOROGUIDE FOR VEIN DEVICE: CPT | Performed by: RADIOLOGY

## 2019-07-26 PROCEDURE — 36561 INSERT TUNNELED CV CATH: CPT

## 2019-07-26 PROCEDURE — 80053 COMPREHEN METABOLIC PANEL: CPT | Performed by: PHYSICIAN ASSISTANT

## 2019-07-26 PROCEDURE — C1769 GUIDE WIRE: HCPCS

## 2019-07-26 PROCEDURE — 83735 ASSAY OF MAGNESIUM: CPT | Performed by: PHYSICIAN ASSISTANT

## 2019-07-26 PROCEDURE — 50433 PLMT NEPHROURETERAL CATHETER: CPT | Performed by: RADIOLOGY

## 2019-07-26 PROCEDURE — C1894 INTRO/SHEATH, NON-LASER: HCPCS

## 2019-07-26 PROCEDURE — C1788 PORT, INDWELLING, IMP: HCPCS

## 2019-07-26 PROCEDURE — 76937 US GUIDE VASCULAR ACCESS: CPT | Performed by: RADIOLOGY

## 2019-07-26 PROCEDURE — 50433 PLMT NEPHROURETERAL CATHETER: CPT

## 2019-07-26 PROCEDURE — 36561 INSERT TUNNELED CV CATH: CPT | Performed by: RADIOLOGY

## 2019-07-26 RX ORDER — ONDANSETRON 2 MG/ML
4 INJECTION INTRAMUSCULAR; INTRAVENOUS ONCE AS NEEDED
Status: DISCONTINUED | OUTPATIENT
Start: 2019-07-26 | End: 2019-07-26 | Stop reason: HOSPADM

## 2019-07-26 RX ORDER — ROCURONIUM BROMIDE 10 MG/ML
INJECTION, SOLUTION INTRAVENOUS AS NEEDED
Status: DISCONTINUED | OUTPATIENT
Start: 2019-07-26 | End: 2019-07-26 | Stop reason: SURG

## 2019-07-26 RX ORDER — SODIUM CHLORIDE 9 MG/ML
75 INJECTION, SOLUTION INTRAVENOUS CONTINUOUS
Status: DISCONTINUED | OUTPATIENT
Start: 2019-07-26 | End: 2019-07-27 | Stop reason: HOSPADM

## 2019-07-26 RX ORDER — 0.9 % SODIUM CHLORIDE 0.9 %
10 VIAL (ML) INJECTION DAILY
Qty: 300 ML | Refills: 3 | Status: SHIPPED | OUTPATIENT
Start: 2019-07-26 | End: 2019-10-08 | Stop reason: HOSPADM

## 2019-07-26 RX ORDER — FENTANYL CITRATE 50 UG/ML
INJECTION, SOLUTION INTRAMUSCULAR; INTRAVENOUS AS NEEDED
Status: DISCONTINUED | OUTPATIENT
Start: 2019-07-26 | End: 2019-07-26 | Stop reason: SURG

## 2019-07-26 RX ORDER — LIDOCAINE HYDROCHLORIDE 10 MG/ML
INJECTION, SOLUTION INFILTRATION; PERINEURAL AS NEEDED
Status: DISCONTINUED | OUTPATIENT
Start: 2019-07-26 | End: 2019-07-26 | Stop reason: SURG

## 2019-07-26 RX ORDER — LEVOFLOXACIN 5 MG/ML
750 INJECTION, SOLUTION INTRAVENOUS ONCE
Status: COMPLETED | OUTPATIENT
Start: 2019-07-26 | End: 2019-07-26

## 2019-07-26 RX ORDER — ONDANSETRON 2 MG/ML
INJECTION INTRAMUSCULAR; INTRAVENOUS AS NEEDED
Status: DISCONTINUED | OUTPATIENT
Start: 2019-07-26 | End: 2019-07-26 | Stop reason: SURG

## 2019-07-26 RX ORDER — METOCLOPRAMIDE HYDROCHLORIDE 5 MG/ML
INJECTION INTRAMUSCULAR; INTRAVENOUS AS NEEDED
Status: DISCONTINUED | OUTPATIENT
Start: 2019-07-26 | End: 2019-07-26 | Stop reason: SURG

## 2019-07-26 RX ORDER — GLYCOPYRROLATE 0.2 MG/ML
INJECTION INTRAMUSCULAR; INTRAVENOUS AS NEEDED
Status: DISCONTINUED | OUTPATIENT
Start: 2019-07-26 | End: 2019-07-26 | Stop reason: SURG

## 2019-07-26 RX ORDER — PALONOSETRON 0.05 MG/ML
0.25 INJECTION, SOLUTION INTRAVENOUS ONCE
Status: CANCELLED | OUTPATIENT
Start: 2019-07-30

## 2019-07-26 RX ORDER — PROPOFOL 10 MG/ML
INJECTION, EMULSION INTRAVENOUS AS NEEDED
Status: DISCONTINUED | OUTPATIENT
Start: 2019-07-26 | End: 2019-07-26 | Stop reason: SURG

## 2019-07-26 RX ORDER — FENTANYL CITRATE/PF 50 MCG/ML
25 SYRINGE (ML) INJECTION
Status: DISCONTINUED | OUTPATIENT
Start: 2019-07-26 | End: 2019-07-26 | Stop reason: HOSPADM

## 2019-07-26 RX ORDER — SUCCINYLCHOLINE/SOD CL,ISO/PF 100 MG/5ML
SYRINGE (ML) INTRAVENOUS AS NEEDED
Status: DISCONTINUED | OUTPATIENT
Start: 2019-07-26 | End: 2019-07-26 | Stop reason: SURG

## 2019-07-26 RX ORDER — NEOSTIGMINE METHYLSULFATE 1 MG/ML
INJECTION INTRAVENOUS AS NEEDED
Status: DISCONTINUED | OUTPATIENT
Start: 2019-07-26 | End: 2019-07-26 | Stop reason: SURG

## 2019-07-26 RX ORDER — SODIUM CHLORIDE 9 MG/ML
20 INJECTION, SOLUTION INTRAVENOUS ONCE
Status: CANCELLED | OUTPATIENT
Start: 2019-07-30

## 2019-07-26 RX ADMIN — SODIUM CHLORIDE: 0.9 INJECTION, SOLUTION INTRAVENOUS at 12:42

## 2019-07-26 RX ADMIN — FENTANYL CITRATE 100 MCG: 50 INJECTION INTRAMUSCULAR; INTRAVENOUS at 11:01

## 2019-07-26 RX ADMIN — LIDOCAINE HYDROCHLORIDE ANHYDROUS 50 MG: 10 INJECTION, SOLUTION INFILTRATION at 11:01

## 2019-07-26 RX ADMIN — PHENYLEPHRINE HYDROCHLORIDE 100 MCG: 10 INJECTION INTRAVENOUS at 11:58

## 2019-07-26 RX ADMIN — METOCLOPRAMIDE HYDROCHLORIDE 10 MG: 5 INJECTION INTRAMUSCULAR; INTRAVENOUS at 11:37

## 2019-07-26 RX ADMIN — LEVOFLOXACIN: 5 INJECTION, SOLUTION INTRAVENOUS at 11:05

## 2019-07-26 RX ADMIN — IOHEXOL 30 ML: 300 INJECTION, SOLUTION INTRAVENOUS at 13:17

## 2019-07-26 RX ADMIN — ROCURONIUM BROMIDE 5 MG: 10 INJECTION INTRAVENOUS at 11:01

## 2019-07-26 RX ADMIN — PHENYLEPHRINE HYDROCHLORIDE 200 MCG: 10 INJECTION INTRAVENOUS at 12:40

## 2019-07-26 RX ADMIN — GLYCOPYRROLATE 0.2 MG: 0.2 INJECTION, SOLUTION INTRAMUSCULAR; INTRAVENOUS at 11:05

## 2019-07-26 RX ADMIN — PROPOFOL 200 MG: 10 INJECTION, EMULSION INTRAVENOUS at 11:01

## 2019-07-26 RX ADMIN — GLYCOPYRROLATE 0.2 MG: 0.2 INJECTION, SOLUTION INTRAMUSCULAR; INTRAVENOUS at 11:25

## 2019-07-26 RX ADMIN — ONDANSETRON 4 MG: 2 INJECTION INTRAMUSCULAR; INTRAVENOUS at 12:51

## 2019-07-26 RX ADMIN — GLYCOPYRROLATE 0.4 MG: 0.2 INJECTION, SOLUTION INTRAMUSCULAR; INTRAVENOUS at 13:04

## 2019-07-26 RX ADMIN — PHENYLEPHRINE HYDROCHLORIDE 100 MCG: 10 INJECTION INTRAVENOUS at 12:32

## 2019-07-26 RX ADMIN — ROCURONIUM BROMIDE 30 MG: 10 INJECTION INTRAVENOUS at 11:54

## 2019-07-26 RX ADMIN — PHENYLEPHRINE HYDROCHLORIDE 100 MCG: 10 INJECTION INTRAVENOUS at 11:28

## 2019-07-26 RX ADMIN — ROCURONIUM BROMIDE 35 MG: 10 INJECTION INTRAVENOUS at 11:10

## 2019-07-26 RX ADMIN — PHENYLEPHRINE HYDROCHLORIDE 100 MCG: 10 INJECTION INTRAVENOUS at 11:16

## 2019-07-26 RX ADMIN — Medication 100 MG: at 11:01

## 2019-07-26 RX ADMIN — NEOSTIGMINE METHYLSULFATE 3 MG: 1 INJECTION INTRAVENOUS at 13:04

## 2019-07-26 RX ADMIN — PHENYLEPHRINE HYDROCHLORIDE 100 MCG: 10 INJECTION INTRAVENOUS at 11:17

## 2019-07-26 RX ADMIN — SODIUM CHLORIDE: 0.9 INJECTION, SOLUTION INTRAVENOUS at 09:35

## 2019-07-26 RX ADMIN — ROCURONIUM BROMIDE 10 MG: 10 INJECTION INTRAVENOUS at 11:25

## 2019-07-26 RX ADMIN — PHENYLEPHRINE HYDROCHLORIDE 100 MCG: 10 INJECTION INTRAVENOUS at 12:34

## 2019-07-26 NOTE — PROGRESS NOTES
F/u note    Patient did not void completely - -400 cc  Would be unwise to cap new PCNU at this time  Discussed with Dr Hiram Forte:    Will place to [address] [place] [address] and send home not capped  Patient to follow with urology next week - consider capping

## 2019-07-26 NOTE — INTERVAL H&P NOTE
Update:     63F with Endometrial cancer and compression of the right ureter  There are plans for chemotherapy and therefore we will place a port for vascular access  I discussed the risks which include bleeding (the patient is on Xarelto, she has held it appropriately) and infection  She has open wounds over her back related to scratching from a medication that has now discontinued  While her renal function is currently preserved there is risk of continued damage to the kidney which will result in decrease in available kidney function if the kidney is not decompressed  Urology also believes leaves that stent placement may not relieve her obstruction  I concur that it may be challenging to stent in this situation  Therefore nephrostomy is indicated to preserve glomerular function  She is obese and this placement may be difficult as the system is not completely dilated and due to anatomy  I discussed the risks of nephrostomy placement including bleeding, damage to the kidney  In order to minimize sedation to perform both at the same time I discussed how this slightly increases the risk of infection  We will give levofloxacin antibiotic prophylaxis  Patient re-evaluated   Accept as history and physical     Marysol Das MD/July 26, 2019/10:53 AM

## 2019-07-26 NOTE — PROGRESS NOTES
IR post procedure note    Copious drainage from newly placed nephroureterostomy  Pink urine, expected to clear  Port site intact  Will cap tube now, she needs to urinate from below before being discharged home  If urine is clearing can restart relative Sunday    Will consult home care as patient is unable to flush the tube and has no one at home can flushes for her  She should flush 10 cc a day  She was instructed to on capped for any pain, fever, chills and placed to bag drainage

## 2019-07-26 NOTE — PROGRESS NOTES
Extensive teaching performed with patient and sister regarding care of nephrostomy tube and PAC site  Pt seems somewhat overwhelmed by volume of information  Sister seems to understand information well  Reassured pt that [facility] RN will contact them and reinforce teaching  Pt discharged to home with nephrostomy tube connected to large drainage bag for weekend  Pt will contact Dr John Baptiste on Monday for follow up care

## 2019-07-26 NOTE — BRIEF OP NOTE (RAD/CATH)
IR PORT PLACEMENT  IR TUBE PLACEMENT NEPHROSTOMY  Procedure Note    PATIENT NAME: Ankush Cortes  : 1955  MRN: [id number]     Pre-op Diagnosis:   1  Other hydronephrosis      Post-op Diagnosis:   1  Other hydronephrosis        Surgeon:   Marian Klinefelter, MD  Assistants:     No qualified resident was available, Resident is only observing    Estimated Blood Loss: minimal  Findings:     Eight Wolof right chest port placed, ready to use    8 [place] 26 cm right percutaneous nephroureterostomy through lower pole access, 1 stick, clear yellow urine encounter    Occlusive stenosis of the right distal ureter crossed with a wire and catheter  Bladder distension    Specimens:  None    Complications:  None immediate    Anesthesia: General     Plans:  Keiko Marcum is ready for use  PCNU will be allowed to drain, we will discuss capping with the urology service      Marian Klinefelter, MD     Date: 2019  Time: 12:56 PM

## 2019-07-26 NOTE — DISCHARGE INSTRUCTIONS
Implanted Venous Access Port     WHAT YOU NEED TO KNOW:   An implanted venous access port is a device used to give treatments and take blood  It may also be called a central venous access device (CVAD)  The port is a small container that is placed under your skin, usually in your upper chest  The port is attached to a catheter that enters a large vein  DISCHARGE INSTRUCTIONS:   Resume your normal diet  Small sips of flat soda will help with mild nausea  Prevent an infection:   · Wash your hands often  Use soap and water  Clean your hands before and after you care for your port  Remind everyone who cares for your port to wash their hands  · Check your skin for infection every day  Look for redness, swelling, or fluid oozing from the port site  Care for your port:   1  You may shower beginning 48 hours after procedure  2  Change dressing if it becomes wet  3  Remove dressing after 24 hours  Leave glue in place  4  It is normal for some bruising to occur  5  Use Tylenol for pain  6  Limit use of arm on the side that your port was placed  Lift nothing heavier than 5 pounds for 1 week, and then gradually increase activity as tolerated  7  DO NOT apply ointment, lotion or cream to port site until incision is healed  Allow glue to fall off  DO NOT attempt to peel glue from skin even it it begins to flake  8, After the port incision is healed you may swim, bathe  Notify the Interventional Radiologist if you have any of the followin  Fever above 101 F    2  Increased redness or swelling after 1st day  3  Increased pain after 1st day  4  Any sign of infection (drainage from port site, skin separation, hot to touch)  5  Persistent nausea or vomiting  Contact Interventional Radiology at [phone] [facility] PATIENTS: Contact Interventional Radiology at [phone]) ([address] [facility] [address]: Contact Interventional Radiology at [phone])        Nephrostomy Tube Care   WHAT YOU NEED TO KNOW:   A nephrostomy tube is a catheter (thin plastic tube) that is inserted through your skin and into your kidney  The nephrostomy tube drains urine from your kidney into a collecting bag outside your body  You may need a nephrostomy tube when something is blocking the normal flow of urine  A nephrostomy tube may be used for a short or a long period of time  The nephrostomy tube comes out of your back, so you will need someone to help care for your nephrostomy tube  DISCHARGE INSTRUCTIONS:   Medicines:   · Antibiotics  may be given to prevent or treat an infection caused by bacteria  You may need to take antibiotics for 5 to 10 days after the tube is placed  · Take your medicine as directed  Contact your healthcare provider if you think your medicine is not helping or you have side effects  Tell him if you are allergic to any medicine  Keep a list of the medicines, vitamins, and herbs you take  Include the amounts, and when and why you take them  Bring the list or the pill bottles to follow-up visits  Carry your medicine list with you in case of an emergency  Follow up with your healthcare provider as directed: You may need a test called a nephrostogram to make sure your nephrostomy tube is in the correct place  Write down your questions so you remember to ask them during your visits  How to clean the skin around the nephrostomy tube and change the bandage:  Since the nephrostomy tube comes out of your back, you will not be able to care for it by yourself  Ask someone to follow the general directions below to check and care for your nephrostomy tube  Ask your healthcare provider how your skin should be cleaned and what skin barriers and attachment devices to use  · Gather the items you will need        ¨ Disposable (single use) under-pad, and a clean washcloth    ¨ Plain soap, warm water, and new medical gloves    ¨ Sterile gauze bandages    ¨ Clear adhesive dressing or medical tape    ¨ Skin barrier    ¨ Tube attachment device     ¨ Protective skin film    ¨ Hydrogen peroxide and saline solution (if ordered by a healthcare provider)    [address] for your skin (if ordered by a healthcare provider)     ¨ Trash bag    · Remove the old bandage, and check the tube entry site  ¨ Have the patient lie on his side with the nephrostomy tube entry site facing up  Place the under-pad where it will catch drainage as you are working with the nephrostomy tube  ¨ Wash your hands with soap and water  Put on new medical gloves  ¨ Gently remove the old bandage and skin barrier  Do this by holding the skin beside the tube with one hand  With the other hand, gently remove sticky tape and the skin barrier by pulling in the same direction as hair growth  Do not touch the side of the bandage that is placed over or around the tube  Throw the bandage and skin barrier away in a trash bag  ¨ Look for signs of infection, such as skin redness and swelling  Report any skin changes to healthcare providers  ¨ There may be a black bernardo on the tube to bernardo the place where the tube enters the skin  Check to see that the black bernardo is next to the skin  If it is further down the tube, the tube has moved  A healthcare provider needs to put it back in  · Clean the tube entry site  ¨ Hold the tube in place to keep it from being pulled out while you are cleaning around it  ¨ You will need to clean the area twice  For the first cleaning, wet a new gauze bandage with soap and water  You may be directed to use hydrogen peroxide instead  Begin at the entry site of the tube  Wipe the skin in circles, moving away from the entry site  Remove blood and any other material with the gauze  Do this as often as needed  Use a new gauze bandage each time you clean the area, moving away from the entry site  ¨ For the second cleaning, wet a new gauze bandage with water  You may be directed to use saline solution instead   Begin at the entry site of the tube  Wipe the skin in circles, moving away from the entry site  Use a new gauze bandage each time you clean the area, moving away from the entry site  ¨ Gently pat the skin with a clean washcloth to dry it  Put medicine on the skin if directed by a healthcare provider  · Apply the skin barrier and bandages  ¨ Cut an opening in the center of the skin barrier large enough to fit around the tube  Cut a slit from the outside edge of the skin barrier to the center opening so that you can fit it around the nephrostomy tube  Place the skin barrier around the nephrostomy tube  Make sure the skin barrier is not touching stitches that may be holding the tube in place  Put a protective skin film over the skin barrier if directed by a healthcare provider  ¨ Roll up a bandage to make it thick, and wrap it around the place where the tube enters the skin  Place it to support the tube, and stop it from kinking or bending  Tape the bandage in place, and apply more bandages if directed by a healthcare provider  ¨ An attachment device may be placed over the bandages to help keep the nephrostomy tube in place  ¨ Bring the tubing forward to the front and tape it to the skin  Do not stretch the tube tight, because this may pull the nephrostomy tube out  How often to change the bandage, skin barrier, and tube attachment device:  Change the bandage around the tube, the bolsters, skin barriers, and tube attachment devices at least every 7 days  If your bandages, barriers, or devices get dirty or wet, change them right away, and as often as needed  If your nephrostomy tube is to be used for a long period of time, the tube needs to be changed every 2 to 3 months  Healthcare providers will tell you when you need to make an appointment to have your tube changed  How to care for the urine drainage bag: You may use a reusable or a single-use (disposable) urine bag   If you are using a disposable urine bag, use it only once, and then throw it away  If you have a reusable urine drainage bag, ask when and how to clean it  The following are general directions for cleaning a reusable urine drainage bag:  · Ask if you need to measure and write down how much urine is in the bag before you empty it  Drain urine out of the drainage bag when it is ½ to ? full  Open the spout at the bottom of the bag to empty the urine into the toilet  · You may need to detach the drainage bag from the nephrostomy tube to clean it  If so, attach a new drainage bag tightly to the nephrostomy tube  · You may need to use a solution such as phosphoric acid to clean the bag  Ask what kind of cleaning solution to use  Use a plastic syringe (without a needle) to gently force the cleaning solution into the drainage bag as you clean it  · Ask if you should leave the cleaning solution in the bag for a time before you drain it out  When it is time to drain the bag, drain the cleaning solution out through the spout at the bottom  · Ask what to use to rinse the urine drainage bag  After you rinse the bag, empty it and hang it up to air dry before you use it again  Throw reusable bags away after you use them for 1 week  How to prevent problems with your nephrostomy tube:   · Change bandages, skin barriers, and attachment devices as directed  This helps to prevent infection  Throw away or clean your drainage bag as directed by your healthcare provider  · Wipe the connecting ends of the drainage bag with alcohol or iodine before you reconnect the bag to the tube  This helps prevent infection  · Keep the tube taped to your skin and connected to a drainage bag placed below the level of your kidneys  This helps prevent urine from backing up into your kidneys  You may wear a small drainage bag strapped to your leg to let you move around more easily      · Use a larger drainage bag at night and when you take naps during the day  This will help prevent urine from leaking out from the place where the tube enters your skin  · Check the catheter to be sure it is in place after you change your clothes or do other activities  Do not wear tight clothing over the tube  Place the tubing over your thigh rather than under it when you are sitting down  Be sure that nothing is pulling on the nephrostomy tube when you move around  · Change positions if you see little or no urine in your drainage bag  Check to see if the urine tube is twisted or bent  Be sure that you are not sitting or lying on the tube  If there are no kinks and there is little or no urine in the drainage bag, tell your healthcare provider  · Flush out the tube as directed  Do this if you think the tube is blocked  Other things to know:   · Drink 2 to 3 liters of liquid each day  unless you were told to limit liquids because of another condition  This amount is equal to about 8 to 12 (eight-ounce) cups of liquid  There should be 30 to 60 milliliters of urine draining into the bag each hour  A large amount of urine that drains over a shorter period of time should be reported  For example, 2,000 milliliters (2 liters) of urine draining out over 8 hours could be a sign of problems  · Keep the site covered while you shower  Tape a piece of clear adhesive plastic over the dressing to keep it dry while you shower  Do not take tub baths  Contact your healthcare provider if:   · The skin around the nephrostomy tube is red, swollen, itches, or has a rash  · You have a fever  · You have lower back or hip pain  · There are changes in how your urine looks or smells  · You have large amounts of urine draining into the drainage bag over a short period of time  · You have little or no urine draining from the nephrostomy tube  · You have nausea and are vomiting      · The black bernardo on your tube has moved, or the tube is longer than when it was put in      · You have questions or concerns about your condition or care  Seek care immediately or call 911 if:   · The nephrostomy tube comes out completely  · There is blood, pus, or a bad smell coming from the place where the tube enters your skin  · Urine is leaking around the tube 10 days after the tube was placed  © 2017 [address] [employer]  Information is for End User's use only and may not be sold, redistributed or otherwise used for commercial purposes  All illustrations and images included in CareNotes® are the copyrighted property of [employer]  or [employer]  The above information is an  only  It is not intended as medical advice for individual conditions or treatments  Talk to your doctor, nurse or pharmacist before following any medical regimen to see if it is safe and effective for you  Call Dr Char Mujica office Monday to arrange for follow up of urinary retention  [phone]    Please record urine amounts until speaking to Dr Char Mujica office on Monday  If possible, please record voided amounts separately from nephrostomy tube amounts

## 2019-07-26 NOTE — ANESTHESIA POSTPROCEDURE EVALUATION
Post-Op Assessment Note    CV Status:  Stable  Pain Score: 0    Pain management: adequate     Mental Status:  Alert   Hydration Status:  Stable   PONV Controlled:  None   Airway Patency:  Patent and adequate   Post Op Vitals Reviewed: Yes      Staff: CRNA   Comments: 6 l FM          BP      Temp      Pulse     Resp      SpO2

## 2019-07-26 NOTE — DISCHARGE INSTRUCTIONS
Nephrostomy Tube Care     WHAT YOU NEED TO KNOW:   A nephrostomy tube is a catheter (thin plastic tube) that is inserted through your skin and into your kidney  The nephrostomy tube drains urine from your kidney into a collecting bag outside your body  You may need a nephrostomy tube when something is blocking the normal flow of urine  A nephrostomy tube may be used for a short or a long period of time  The nephrostomy tube comes out of your back, so you will need someone to help care for your nephrostomy tube  DISCHARGE INSTRUCTIONS:      How to clean the skin around the nephrostomy tube and change the bandage:  Since the nephrostomy tube comes out of your back, you will not be able to care for it by yourself  Ask someone to follow the general directions below to check and care for your nephrostomy tube  Gather the items you will need  Disposable (single use) under-pad, and a clean washcloth  ¨ Plain soap, warm water, and new medical gloves  ¨ Sterile gauze bandages  ¨ Clear adhesive dressing or medical tape  ¨ Skin barrier  ¨ Protective skin film  ¨ Trash bag  · Remove the old bandage, and check the tube entry site  ¨ Have the patient lie on his side with the nephrostomy tube entry site facing up  Place the under-pad where it will catch drainage as you are working with the nephrostomy tube  ¨ Wash your hands with soap and water  Put on new medical gloves  ¨ Gently remove the old bandage, without pulling on the tube  Do this by holding the skin beside the tube with one hand  With the other hand, gently remove sticky tape and the skin barrier by pulling in the same direction as hair growth  Do not touch the side of the bandage that is placed over or around the tube  Throw the bandage and skin barrier away in a trash bag  ¨ Look for signs of infection, such as skin redness and swelling  Report any skin changes to healthcare providers  ¨ Clean the tube entry site      ¨ Hold the tube in place to keep it from being pulled out while you are cleaning around it  ¨ You will need to clean the area twice  For the first cleaning, wet a new gauze bandage with soap and water  Begin at the entry site of the tube  Wipe the skin in circles, moving away from the entry site  Remove blood and any other material with the gauze  Do this as often as needed  Use a new gauze bandage each time you clean the area, moving away from the entry site  ¨ For the second cleaning, wet a new gauze bandage with water  Begin at the entry site of the tube  Wipe the skin in circles, moving away from the entry site  Use a new gauze bandage each time you clean the area, moving away from the entry site  ¨ Gently pat the skin with a clean washcloth to dry it  · Apply the skin barrier and bandages  ¨ Roll up a bandage to make it thick, and place it under  the place where the tube enters the skin  Place it to support the tube, and stop it from kinking or bending  Tape the bandage in place, and apply more bandages if directed by a healthcare provider  ¨ Bring the tubing forward to the front and tape it to the skin  Do not stretch the tube tight, because this may pull the nephrostomy tube out  How often to change the bandage  Change the bandage around the tube, every other day  If your bandages  get dirty or wet, change them right away, and as often as needed  If your nephrostomy tube is to be used for a long period of time, the tube needs to be changed every 2 to 3 months  Healthcare providers will tell you when you need to make an appointment to have your tube changed  How to care for the urine drainage bag:   · Ask if you need to measure and write down how much urine is in the bag before you empty it  Drain urine out of the drainage bag when it is ½ to ? full  Open the spout at the bottom of the bag to empty the urine into the toilet  · You may need to detach the drainage bag from the nephrostomy tube to change it    If so, attach a new drainage bag tightly to the nephrostomy tube  ·   How to prevent problems with your nephrostomy tube:   · Change bandages, directed  This helps to prevent infection  Throw away or clean your drainage bag as directed by your healthcare provider  · Wipe the connecting ends of the drainage bag with alcohol before you reconnect the bag to the tube  This helps prevent infection  Keep the tube taped to your skin and connected to a drainage bag placed below the level of your kidneys  This helps prevent urine from backing up into your kidneys  You may wear a small drainage bag strapped to your leg to let you move around more easily  · Check the catheter to be sure it is in place after you change your clothes or do other activities  Do not wear tight clothing over the tube  Place the tubing over your thigh rather than under it when you are sitting down  Be sure that nothing is pulling on the nephrostomy tube when you move around  · Change positions if you see little or no urine in your drainage bag  Check to see if the urine tube is twisted or bent  Be sure that you are not sitting or lying on the tube  If there are no kinks and there is little or no urine in the drainage bag, tell your healthcare provider  · Flush out the tube as directed  Some tubes get flushed one time a day with 10 mls of NSS You will be given a prescription for the flushes  To flush the nephrostomy tube, clean both connections with alcohol swap  Twist off the drainage bag tube and twist the saline syringe into the nephrostomy tube and flush briskly  Remove the syringe and twist the drainage bag tube back into the nephrostomy tube  · Keep the site covered while you shower  Tape a piece of clear adhesive plastic over the dressing to keep it dry while you shower  Do not take tub baths      Contact Interventional Radiology at [phone] [facility] PATIENTS: Contact Interventional Radiology at [phone]) [facility] PATIENTS: Contact Interventional Radiology at [phone]) if:  · The skin around the nephrostomy tube is red, swollen, itches, or has a rash  · You have a fever greater than 101 or chills  · You have lower back or hip pain  · There are changes in how your urine looks or smells  · You have little or no urine draining from the nephrostomy tube  · You have nausea and are vomiting  · The black bernardo on your tube has moved, or the tube is longer than when it was put in    · You have questions or concerns about your condition or care  · The nephrostomy tube comes out completely  · There is blood, pus, or a bad smell coming from the place where the tube enters your skin  · Urine is leaking around the tube  The following pharmacies carry the flush syringes  [facility]                     [facility]       [address]  Phone [phone]            Phone [phone]  [address] & [facility]                      [address]                                 [phone]  Phone [phone]            Phone [phone]    [facility]                                                                         [facility] [phone]  [address]   Phone [phone]

## 2019-07-26 NOTE — ANESTHESIA PREPROCEDURE EVALUATION
65 yo F with recurrence of endometrial CA causing right hydronephrosis and DVT, presenting for port placement and nephrostomy tube placement  Review of Systems/Medical History  Patient summary reviewed  Chart reviewed  No history of anesthetic complications     Cardiovascular  Hyperlipidemia, Hypertension , DVT (RLE)   Pulmonary  Negative pulmonary ROS        GI/Hepatic    Bariatric surgery,          Comment: Hydronephrosis     Endo/Other  No diabetes ,   Obesity (BMI 44)  morbid obesity   GYN    Hysterectomy, Uterine cancer (recurrent endometrial CA with hydronephrosis),        Hematology  Anemia ,  Coagulation disorder (xarelto) currently taking oral anticoagulants,    Musculoskeletal  Negative musculoskeletal ROS        Neurology  Negative neurology ROS      Psychology   Depression ,   Chronic opioid dependence (oxycontin/oxycodone approx 100 mg/day)          Physical Exam    Airway    Mallampati score: II  TM Distance: >3 FB  Neck ROM: full     Dental   No notable dental hx     Cardiovascular      Pulmonary      Other Findings       Lab Results   Component Value Date    WBC 12 90 (H) 07/09/2019    HGB 11 0 (L) 07/09/2019     (H) 07/09/2019     Lab Results   Component Value Date    SODIUM 134 (L) 07/09/2019    K 4 1 07/09/2019    BUN 12 07/09/2019    CREATININE 0 77 07/09/2019    GLUCOSE 219 (H) 12/19/2017     Lab Results   Component Value Date    PTT 39 (H) 07/08/2019      Lab Results   Component Value Date    INR 1 15 07/08/2019     Anesthesia Plan  ASA Score- 3     Anesthesia Type- general with ASA Monitors  Additional Monitors:   Airway Plan: ETT  Comment: Supine then prone positioning  Plan Factors-    Induction- intravenous  Postoperative Plan-     Informed Consent- Anesthetic plan and risks discussed with patient  I personally reviewed this patient with the CRNA  Discussed and agreed on the Anesthesia Plan with the CRNA  Lolly Cavanaugh

## 2019-07-26 NOTE — PROGRESS NOTES
Pt attempted to void with start and stop flow  PVR checked resulting in 300-400cc PVR  Spoke to Dr Shiv Rodríguez regarding PVR  Recommended placing PCNU tube to bag drainage  Pt to contact Urology on Monday regarding urine retention  Directed to record urine amounts until Monday, both from nephrostomy bag and voided

## 2019-07-26 NOTE — NURSING NOTE
Called Dr Piedad Ocasio office and spoke with [employer] regarding a [facility] order for pt to have home care come in and flush her PCNU tube once per day with 10 cc of NSS  [employer] will be passing message along to the nurse     [address] [place] [address] at Dr Maylin Wise office advised they don't put in home health care orders and if we would like to see if they will do an exception we can call the doctor on call Dr Ovi Thibodeaux  Meanwhile Dayna Jones in PACU was able to facilitate home health care with case management  Pt will be set up for flushes at home

## 2019-07-26 NOTE — TELEPHONE ENCOUNTER
Received a call from Thea Siddiqi from PACU asking for orders for Home health for flushes for patient  I explained that IR always takes care of that and we have no providers available at the moment to authorize    She will contact the doctor to get order

## 2019-07-29 ENCOUNTER — TELEPHONE (OUTPATIENT)
Dept: UROLOGY | Facility: MEDICAL CENTER | Age: 64
End: 2019-07-29

## 2019-07-29 NOTE — TELEPHONE ENCOUNTER
Called patient and advised of above  Patient seems to be overwhelmed  States she was discharged on Friday but does not understand follow up  Patient wants to know how long she will need to be "hooked up to this bag" and "I thought this was going to be capped"  Patient not scheduled for follow up with our office until October and would like some clarification as to plan  I advised I will speak with provider and someone from office will call her back

## 2019-07-29 NOTE — TELEPHONE ENCOUNTER
Patient of Dr Zee Hernandez seen in [place]  Had Nephrostomy tube placed on Friday 7/26  Advised she was to contact the office on Monday regarding whether or not the bag will remain hooked up       She can be reached at [phone]

## 2019-07-29 NOTE — TELEPHONE ENCOUNTER
The patient is obstructed so I therefore do not recommend she cap her nephrostomy tube  If she needs to schedule a visit discussed with provider recommend she do so

## 2019-07-29 NOTE — TELEPHONE ENCOUNTER
Call placed to patient  She seemed to understand at this point  I did advise patient that if she feels she needs further explanation at any point an appointment can be made with a provider for discussion  Patient verbalized understanding

## 2019-07-30 ENCOUNTER — TELEPHONE (OUTPATIENT)
Dept: UROLOGY | Facility: HOSPITAL | Age: 64
End: 2019-07-30

## 2019-07-30 ENCOUNTER — HOSPITAL ENCOUNTER (OUTPATIENT)
Dept: INFUSION CENTER | Facility: CLINIC | Age: 64
Discharge: HOME | End: 2019-07-30
Payer: COMMERCIAL

## 2019-07-30 ENCOUNTER — DOCUMENTATION (OUTPATIENT)
Dept: NUTRITION | Facility: CLINIC | Age: 64
End: 2019-07-30

## 2019-07-30 VITALS
HEIGHT: 59 IN | BODY MASS INDEX: 42.94 KG/M2 | SYSTOLIC BLOOD PRESSURE: 118 MMHG | WEIGHT: 213 LBS | TEMPERATURE: 98.3 F | DIASTOLIC BLOOD PRESSURE: 64 MMHG

## 2019-07-30 DIAGNOSIS — C54.1 ENDOMETRIAL CANCER (HCC): Primary | ICD-10-CM

## 2019-07-30 PROCEDURE — 96413 CHEMO IV INFUSION 1 HR: CPT

## 2019-07-30 PROCEDURE — 96417 CHEMO IV INFUS EACH ADDL SEQ: CPT

## 2019-07-30 PROCEDURE — 96367 TX/PROPH/DG ADDL SEQ IV INF: CPT

## 2019-07-30 PROCEDURE — 96375 TX/PRO/DX INJ NEW DRUG ADDON: CPT

## 2019-07-30 PROCEDURE — 96415 CHEMO IV INFUSION ADDL HR: CPT

## 2019-07-30 RX ORDER — PALONOSETRON 0.05 MG/ML
0.25 INJECTION, SOLUTION INTRAVENOUS ONCE
Status: COMPLETED | OUTPATIENT
Start: 2019-07-30 | End: 2019-07-30

## 2019-07-30 RX ORDER — SODIUM CHLORIDE 9 MG/ML
20 INJECTION, SOLUTION INTRAVENOUS ONCE
Status: COMPLETED | OUTPATIENT
Start: 2019-07-30 | End: 2019-07-30

## 2019-07-30 RX ADMIN — CARBOPLATIN 574.2 MG: 10 INJECTION, SOLUTION INTRAVENOUS at 13:03

## 2019-07-30 RX ADMIN — PALONOSETRON 0.25 MG: 0.05 INJECTION, SOLUTION INTRAVENOUS at 08:40

## 2019-07-30 RX ADMIN — PACLITAXEL 337.8 MG: 6 INJECTION, SOLUTION INTRAVENOUS at 10:05

## 2019-07-30 RX ADMIN — FAMOTIDINE 20 MG: 10 INJECTION INTRAVENOUS at 09:13

## 2019-07-30 RX ADMIN — DEXAMETHASONE SODIUM PHOSPHATE 20 MG: 10 INJECTION, SOLUTION INTRAMUSCULAR; INTRAVENOUS at 08:38

## 2019-07-30 RX ADMIN — DIPHENHYDRAMINE HYDROCHLORIDE 25 MG: 50 INJECTION, SOLUTION INTRAMUSCULAR; INTRAVENOUS at 08:57

## 2019-07-30 RX ADMIN — SODIUM CHLORIDE 20 ML/HR: 0.9 INJECTION, SOLUTION INTRAVENOUS at 08:15

## 2019-07-30 RX ADMIN — SODIUM CHLORIDE 150 MG: 0.9 INJECTION, SOLUTION INTRAVENOUS at 09:28

## 2019-07-30 NOTE — TELEPHONE ENCOUNTER
Please schedule patient for a void trial with nurse this week where the nephrostomy tube is capped in the morning and a PVR is checked in the afternoon

## 2019-07-30 NOTE — TELEPHONE ENCOUNTER
Patient returned my call and she stated that she is getting her first chemo treatment right now and would like to postpone till Thursday    OV rescheduled for her convenience

## 2019-07-30 NOTE — PROGRESS NOTES
Pt here for first time Taxol/Carbo for treatment of endometrial ca  Vitals stable; labs within parameters for treatment  K and Mag low; call made to office and awaiting callback  Callbell within reach; will continue to monitor

## 2019-07-30 NOTE — PROGRESS NOTES
Met with Cayetano in infusion today to discuss her nutrition after receiving notification by Infusion RN (Jd Wood ) on 7/30/19 that pt is appropriate for oncology nutrition care (reason for referral: RN Joya Diaz ) request due to MST: 2; pt reports really having to 'force' herself to eat and has lost 'at least' 10# in the past month  She reports having no appetite at all  )  Cayetano reports that she has been struggling with appetite loss, wt loss, constipation, dysgeusia, mouth sores, and xerostomia recently  She is forcing herself to eat and is considering adding protein shakes to her eating pattern to supplement her diet  She reports that she is going on vacation to [facility]  [place] on 8/9/19 for a week and would like to see this RD when she comes in for her next infusion rather than setting up a separate appointment  Provided this RDs contact information and set up initial RD consultation for 8/19/19 during next chemotherapy infusion  Provided pt with an [facility] Eating Hints book with sections circled to review in the meantime  Will follow with full nutrition assessment on 8/19/19 per plan  PMH:  Oncology Diagnosis & Treatments:     Endometrial cancer ([facility] [address] )    11/17/2017 Initial Diagnosis     Endometrial cancer ([facility] [address] )      12/19/2017 Surgery     Robotic assisted total laparoscopic hysterectomy with bilateral salpingo-oophorectomy and sentinel bilateral pelvic lymph node dissection  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 (4 4 x 3 2 cm tumor, 9 4/15 4mm invasion, NO LVSI, washings revealed atypical cellular changes)      12/19/2017 Genetic Testing     Morrison testing negative      7/8/2019 Recurrence     Presented with right lower extremity DVT and CT demonstrating right pelvic sidewall mass with venous, ureteral and nerve compression causing significant neuropathic pain  Core biopsy demonstrates high-grade carcinoma        7/30/2019 -  Chemotherapy     CARBOplatin (PARAPLATIN) 574 2 mg in sodium chloride 0 9 % 250 mL IVPB, 574 2 mg, Intravenous, Once, 1 of 6 cycles  PACLitaxel (TAXOL) 337 8 mg in sodium chloride 0 9 % 500 mL chemo IVPB, 175 mg/m2 = 337 8 mg, Intravenous, Once, 1 of 6 cycles       Past Medical History:   Diagnosis Date    Anemia     Depression     Diabetes mellitus ([facility] [address] )     Endometrial cancer ([facility] [address] ) 12/2017    Hyperglycemia     vx type 2 dm -- last assessed 4/1/14; resolved 11/7/17    Hyperlipidemia     Hypertension     Obesity     last assessed 10/14/17; resolved 11/7/17     Past Surgical History:   Procedure Laterality Date    ABDOMINAL SURGERY      GASTRIC BYPASS    CHOLECYSTECTOMY      at the time of gastric bypass    COLONOSCOPY      CT NEEDLE BIOPSY LYMPH NODE  7/8/2019    GASTRIC BYPASS      HYSTERECTOMY Bilateral     total abdominal with salpingo-oophorectomy    IR PORT PLACEMENT  7/26/2019    IR TUBE PLACEMENT NEPHROSTOMY  7/26/2019    OOPHORECTOMY Bilateral     NH LAP, RADICAL HYST W/ TUBE&OV, NODE BX N/A 12/19/2017    Procedure: HYSTERECTOMY LAPAROSCOPIC TOTAL ([address] [place] [address]) W/ ROBOTICS; BILATERAL SALPINGOOPHERECTOMY; LYMPH NODE DISSECTION; lysis of adhesions;  Surgeon: Yudi Fierro MD;  [facility]: [facility];  Service: Gynecology Oncology    NH LAP,DIAGNOSTIC ABDOMEN N/A 12/19/2017    Procedure: LAPAROSCOPY DIAGNOSTIC;  Surgeon: Yudi Fierro MD;  [facility];  Service: Gynecology Oncology    TONSILLECTOMY      US GUIDED BREAST BIOPSY RIGHT COMPLETE Right 6/28/2019       Review of Medications:     Current Outpatient Medications:     ARIPiprazole (ABILIFY) 10 mg tablet, Take 10 mg by mouth daily, Disp: , Rfl:     aspirin (ECOTRIN LOW STRENGTH) 81 mg EC tablet, Take 81 mg by mouth daily, Disp: , Rfl:     cholecalciferol (VITAMIN D3) 1,000 units tablet, Take 1,000 Units by mouth daily, Disp: , Rfl:     dexmethylphenidate (FOCALIN XR) 10 MG 24 hr capsule, Take 10 mg by mouth daily, Disp: , Rfl:     Ferrous Sulfate (IRON) 28 MG TABS, Take by mouth, Disp: , Rfl:     furosemide (LASIX) 40 mg tablet, Take 1 tablet (40 mg total) by mouth 2 (two) times a day, Disp: 180 tablet, Rfl: 1    gemfibrozil (LOPID) 600 mg tablet, TAKE 1 TABLET BY MOUTH DAILY, Disp: 90 tablet, Rfl: 1    lactulose 20 g/30 mL, Take 15 mL (10 g total) by mouth 3 (three) times a day for 22 days, Disp: 946 mL, Rfl: 0    LORazepam (ATIVAN) 1 mg tablet, Take 1 tablet (1 mg total) by mouth every 6 (six) hours as needed for anxiety (or nausea), Disp: 36 tablet, Rfl: 1    metFORMIN (GLUCOPHAGE) 500 mg tablet, Take 1 tablet (500 mg total) by mouth daily, Disp: 180 tablet, Rfl: 1    MYRBETRIQ 50 MG TB24, TAKE 1 TABLET DAILY, Disp: 30 tablet, Rfl: 10    omeprazole (PriLOSEC) 20 mg delayed release capsule, Take 20 mg by mouth daily, Disp: , Rfl:     ondansetron (ZOFRAN) 8 mg tablet, Take 1 tablet (8 mg total) by mouth every 8 (eight) hours as needed for nausea or vomiting, Disp: 30 tablet, Rfl: 1    oxybutynin (DITROPAN-XL) 10 MG 24 hr tablet, TAKE 1 TABLET BY MOUTH EVERY DAY, Disp: 30 tablet, Rfl: 11    oxyCODONE (OxyCONTIN) 20 mg 12 hr tablet, Take 1 tablet (20 mg total) by mouth every 8 (eight) hours for 30 daysMax Daily Amount: 60 mg, Disp: 90 tablet, Rfl: 0    oxyCODONE (ROXICODONE) 5 mg immediate release tablet, Take 1 tablet (5 mg total) by mouth every 4 (four) hours as needed for severe pain for up to 30 daysMax Daily Amount: 30 mg, Disp: 90 tablet, Rfl: 0    potassium chloride (K-DUR,KLOR-CON) 20 mEq tablet, Take 1 tablet (20 mEq total) by mouth 2 (two) times a day, Disp: 180 tablet, Rfl: 1    quinapril (ACCUPRIL) 5 mg tablet, TAKE 1 TABLET BY MOUTH DAILY AT BEDTIME, Disp: 90 tablet, Rfl: 1    rivaroxaban (XARELTO) 20 mg tablet, Take 1 tablet (20 mg total) by mouth daily with breakfast, Disp: 30 tablet, Rfl: 4    senna (SENOKOT) 8 6 MG tablet, Take 2 tablets (17 2 mg total) by mouth 2 (two) times a day for 14 days, Disp: 56 tablet, Rfl: 0    sodium chloride, PF, 0 9 %, 10 mL by Intracatheter route daily for 30 days, Disp: 300 mL, Rfl: 3    venlafaxine (EFFEXOR) 75 mg tablet, Take 75 mg by mouth 3 (three) times a day  , Disp: , Rfl:   No current facility-administered medications for this visit  Facility-Administered Medications Ordered in Other Visits:     CARBOplatin (PARAPLATIN) 574 2 mg in sodium chloride 0 9 % 250 mL IVPB, 574 2 mg, Intravenous, Once, Kirsty Martini MD    PACLitaxel (TAXOL) 337 8 mg in sodium chloride 0 9 % 500 mL chemo IVPB, 175 mg/m2 (Treatment Plan Recorded), Intravenous, Once, Kirsty Martini MD, Last Rate: 185 4 mL/hr at 07/30/19 1005, 337 8 mg at 07/30/19 1005    Most Recent Lab Results:   Lab Results   Component Value Date    WBC 13 54 (H) 07/26/2019    CHOL 183 10/27/2017    TRIG 88 11/01/2018    HDL 59 11/01/2018    LDLCALC 106 (H) 10/27/2017    ALT 19 07/26/2019    AST 13 07/26/2019     10/27/2017     05/12/2017    K 3 0 (L) 07/26/2019    K 4 1 07/09/2019    BUN 11 07/26/2019    BUN 12 07/09/2019    CREATININE 0 92 07/26/2019    CREATININE 0 77 07/09/2019    PHOS 3 2 07/08/2019    GLUCOSE 219 (H) 12/19/2017    GLUCOSE 111 (H) 10/27/2017    HGBA1C 5 5 11/01/2018    HGBA1C 5 6 03/12/2018    HGBA1C 5 6 10/27/2017    CALCIUM 8 5 07/26/2019    MG 1 5 (L) 07/26/2019       Anthropometric Measurements:   Ht Readings from Last 1 Encounters:   07/30/19 4' 11" (1 499 m)     -Weight History:    Wt Readings from Last 15 Encounters:   07/30/19 96 6 kg (213 lb)   07/26/19 100 kg (221 lb)   07/22/19 100 kg (221 lb)   07/19/19 101 kg (222 lb 12 8 oz)   07/18/19 101 kg (222 lb)   07/16/19 101 kg (223 lb)   07/07/19 102 kg (224 lb 13 9 oz)   07/07/19 102 kg (224 lb 13 9 oz)   06/28/19 104 kg (230 lb)   06/24/19 106 kg (234 lb)   06/20/19 106 kg (233 lb)   06/19/19 106 kg (233 lb)   06/12/19 107 kg (235 lb)   06/03/19 104 kg (230 lb)   05/29/19 107 kg (236 lb)     Estimated body mass index is 43 02 kg/m² as calculated from the following:    Height as of an earlier encounter on 7/30/19: 4' 11" (1 499 m)  Weight as of an earlier encounter on 7/30/19: 96 6 kg (213 lb)

## 2019-07-30 NOTE — TELEPHONE ENCOUNTER
LMOM for patient to call regarding ov for this note    Patient scheduled in [facility] but need to see if she is avaialable

## 2019-07-31 ENCOUNTER — PATIENT OUTREACH (OUTPATIENT)
Dept: FAMILY MEDICINE CLINIC | Facility: CLINIC | Age: 64
End: 2019-07-31

## 2019-07-31 NOTE — PROGRESS NOTES
Patient had 1st dose of chemo yesterday  She stated she feels well, but was warned that tomorrow may be a rough day  Care manager called patient to offer care management  Patient stated all of her needs are being met and she does not want care management at this time  Patient did not want care managers contact information

## 2019-08-01 ENCOUNTER — PROCEDURE VISIT (OUTPATIENT)
Dept: UROLOGY | Facility: AMBULATORY SURGERY CENTER | Age: 64
End: 2019-08-01
Payer: COMMERCIAL

## 2019-08-01 ENCOUNTER — TELEPHONE (OUTPATIENT)
Dept: GYNECOLOGIC ONCOLOGY | Facility: CLINIC | Age: 64
End: 2019-08-01

## 2019-08-01 VITALS
BODY MASS INDEX: 42.64 KG/M2 | SYSTOLIC BLOOD PRESSURE: 128 MMHG | DIASTOLIC BLOOD PRESSURE: 80 MMHG | WEIGHT: 211.5 LBS | HEIGHT: 59 IN | HEART RATE: 64 BPM

## 2019-08-01 DIAGNOSIS — M25.561 PAIN IN JOINT INVOLVING RIGHT LOWER LEG: Primary | ICD-10-CM

## 2019-08-01 DIAGNOSIS — Z79.01 ANTICOAGULATED: ICD-10-CM

## 2019-08-01 DIAGNOSIS — N13.30 HYDRONEPHROSIS, UNSPECIFIED HYDRONEPHROSIS TYPE: Primary | ICD-10-CM

## 2019-08-01 DIAGNOSIS — I82.4Y1 ACUTE DEEP VEIN THROMBOSIS (DVT) OF PROXIMAL VEIN OF RIGHT LOWER EXTREMITY (HCC): ICD-10-CM

## 2019-08-01 LAB — POST-VOID RESIDUAL VOLUME, ML POC: 33 ML

## 2019-08-01 PROCEDURE — 51798 US URINE CAPACITY MEASURE: CPT

## 2019-08-01 NOTE — PROGRESS NOTES
Patient of Dr Ryan Hurt with history of hydronephrosis  Patient arrived for void trial this am   Nephrostomy tube was capped  Specific instructions were given to her sister   on proper flushing of the nephrostomy tube  Patient was instructed to drink plenty of water and come back this afternoon for PVR  She is aware that if she is unable to void and is uncomfortable that she may come back sooner than scheduled ov  Patient returned this afternoon and had been voiding throughout the day    PVR 33 ml

## 2019-08-01 NOTE — TELEPHONE ENCOUNTER
Patient called with c/o joint stiffness in RLE  She has a history of DVT on that side, and is currently taking Xarelto  She denies pain, redness, warmth, or swelling in the extremity  She has been taking her Xarelto as directed  She requests a PT evaluation  Order placed  Warning signs of acute DVT reviewed

## 2019-08-02 ENCOUNTER — TELEPHONE (OUTPATIENT)
Dept: UROLOGY | Facility: AMBULATORY SURGERY CENTER | Age: 64
End: 2019-08-02

## 2019-08-02 ENCOUNTER — HOSPITAL ENCOUNTER (OUTPATIENT)
Dept: INFUSION CENTER | Facility: CLINIC | Age: 64
Discharge: HOME | End: 2019-08-02
Payer: COMMERCIAL

## 2019-08-02 ENCOUNTER — OFFICE VISIT (OUTPATIENT)
Dept: PALLIATIVE MEDICINE | Facility: CLINIC | Age: 64
End: 2019-08-02
Payer: COMMERCIAL

## 2019-08-02 VITALS
OXYGEN SATURATION: 98 % | WEIGHT: 207.8 LBS | SYSTOLIC BLOOD PRESSURE: 104 MMHG | RESPIRATION RATE: 16 BRPM | HEART RATE: 93 BPM | TEMPERATURE: 98.3 F | DIASTOLIC BLOOD PRESSURE: 80 MMHG | BODY MASS INDEX: 41.97 KG/M2

## 2019-08-02 DIAGNOSIS — Z45.2 ENCOUNTER FOR CENTRAL LINE CARE: ICD-10-CM

## 2019-08-02 DIAGNOSIS — C54.1 ENDOMETRIAL CANCER (HCC): Primary | ICD-10-CM

## 2019-08-02 DIAGNOSIS — K59.03 THERAPEUTIC OPIOID INDUCED CONSTIPATION: ICD-10-CM

## 2019-08-02 DIAGNOSIS — T40.2X5A THERAPEUTIC OPIOID INDUCED CONSTIPATION: ICD-10-CM

## 2019-08-02 DIAGNOSIS — G89.3 CANCER RELATED PAIN: ICD-10-CM

## 2019-08-02 DIAGNOSIS — C54.1 ENDOMETRIAL CANCER (HCC): Primary | Chronic | ICD-10-CM

## 2019-08-02 DIAGNOSIS — F32.9 MAJOR DEPRESSION, CHRONIC: ICD-10-CM

## 2019-08-02 LAB
ALBUMIN SERPL BCP-MCNC: 3.4 G/DL (ref 3.5–5)
ALP SERPL-CCNC: 120 U/L (ref 46–116)
ALT SERPL W P-5'-P-CCNC: 30 U/L (ref 12–78)
ANION GAP SERPL CALCULATED.3IONS-SCNC: 11 MMOL/L (ref 4–13)
AST SERPL W P-5'-P-CCNC: 25 U/L (ref 5–45)
BASOPHILS # BLD MANUAL: 0 THOUSAND/UL (ref 0–0.1)
BASOPHILS NFR MAR MANUAL: 0 % (ref 0–1)
BILIRUB SERPL-MCNC: 0.5 MG/DL (ref 0.2–1)
BUN SERPL-MCNC: 16 MG/DL (ref 5–25)
CALCIUM SERPL-MCNC: 9.3 MG/DL (ref 8.3–10.1)
CHLORIDE SERPL-SCNC: 95 MMOL/L (ref 100–108)
CO2 SERPL-SCNC: 30 MMOL/L (ref 21–32)
CREAT SERPL-MCNC: 0.91 MG/DL (ref 0.6–1.3)
EOSINOPHIL # BLD MANUAL: 0.11 THOUSAND/UL (ref 0–0.4)
EOSINOPHIL NFR BLD MANUAL: 1 % (ref 0–6)
ERYTHROCYTE [DISTWIDTH] IN BLOOD BY AUTOMATED COUNT: 13.2 % (ref 11.6–15.1)
GFR SERPL CREATININE-BSD FRML MDRD: 68 ML/MIN/1.73SQ M
GLUCOSE SERPL-MCNC: 121 MG/DL (ref 65–140)
HCT VFR BLD AUTO: 35 % (ref 34.8–46.1)
HGB BLD-MCNC: 11.2 G/DL (ref 11.5–15.4)
LYMPHOCYTES # BLD AUTO: 1.8 THOUSAND/UL (ref 0.6–4.47)
LYMPHOCYTES # BLD AUTO: 16 % (ref 14–44)
MAGNESIUM SERPL-MCNC: 1.4 MG/DL (ref 1.6–2.6)
MCH RBC QN AUTO: 27.8 PG (ref 26.8–34.3)
MCHC RBC AUTO-ENTMCNC: 32 G/DL (ref 31.4–37.4)
MCV RBC AUTO: 87 FL (ref 82–98)
MONOCYTES # BLD AUTO: 0.11 THOUSAND/UL (ref 0–1.22)
MONOCYTES NFR BLD: 1 % (ref 4–12)
NEUTROPHILS # BLD MANUAL: 9.1 THOUSAND/UL (ref 1.85–7.62)
NEUTS SEG NFR BLD AUTO: 81 % (ref 43–75)
NRBC BLD AUTO-RTO: 0 /100 WBCS
PLATELET # BLD AUTO: 404 THOUSANDS/UL (ref 149–390)
PLATELET BLD QL SMEAR: ADEQUATE
PMV BLD AUTO: 9.6 FL (ref 8.9–12.7)
POTASSIUM SERPL-SCNC: 3.3 MMOL/L (ref 3.5–5.3)
PROT SERPL-MCNC: 8.2 G/DL (ref 6.4–8.2)
RBC # BLD AUTO: 4.03 MILLION/UL (ref 3.81–5.12)
RBC MORPH BLD: NORMAL
SODIUM SERPL-SCNC: 136 MMOL/L (ref 136–145)
TOTAL CELLS COUNTED SPEC: 100
VARIANT LYMPHS # BLD AUTO: 1 %
WBC # BLD AUTO: 11.24 THOUSAND/UL (ref 4.31–10.16)

## 2019-08-02 PROCEDURE — 83735 ASSAY OF MAGNESIUM: CPT

## 2019-08-02 PROCEDURE — 80053 COMPREHEN METABOLIC PANEL: CPT

## 2019-08-02 PROCEDURE — 85027 COMPLETE CBC AUTOMATED: CPT

## 2019-08-02 PROCEDURE — 85007 BL SMEAR W/DIFF WBC COUNT: CPT

## 2019-08-02 PROCEDURE — 99214 OFFICE O/P EST MOD 30 MIN: CPT | Performed by: INTERNAL MEDICINE

## 2019-08-02 NOTE — TELEPHONE ENCOUNTER
Patient of Erendira  Patient seen yesterday for nephrostomy void trial  PVR 33mL  Will route to provider for any change in orders

## 2019-08-02 NOTE — PLAN OF CARE
Problem: Potential for Falls  Goal: Patient will remain free of falls  Description  INTERVENTIONS:  - Assess patient frequently for physical needs  -  Identify cognitive and physical deficits and behaviors that affect risk of falls    -  [place] fall precautions as indicated by assessment   - Educate patient/family on patient safety including physical limitations  - Instruct patient to call for assistance with activity based on assessment  - Modify environment to reduce risk of injury  - Consider OT/PT consult to assist with strengthening/mobility  Outcome: Progressing

## 2019-08-02 NOTE — PROGRESS NOTES
Palliative and [address] [age] y o  female [phone]    Assessment/Plan:  1  Endometrial cancer ([address] )    2  Therapeutic opioid induced constipation    3  Cancer related pain    4  Major depression, chronic      Cancer related pain   -patient herself discontinued oxycodone ER 20mg PO q8h [facility]   -oxycodone IR 10mg PO q4h PRN moderate pain    Patient has reduced dose to 5mg PO q4h PRN and has been taking sporadically       -since nephrostomy tube placement, patient has had significant pain relief  So much so that patient discontinued use of the oxycodone ER completely and has only sporadically been taking 5mg oxycodone IR  Patient has a significant number of pills remaining from both prescriptions which she will keep if the pain returns, especially since she is going away on vacation  I instructed patient to call the office if pain worsens and she will receive instructions on how to restart medications if needed  At this time patient will take 2 5mg oxycodone IR q4h PRN until able to stop completely and will only take tylenol  Constipation   -lactulose 20mg TID   -since stopping the oxycodone ER, patient has been having more BMs (2-3 daily) taking the lactulose TID as prescribed  This morning she even had an episode of loose stool  As such, advised patient to take lactulose only once daily as she has a component of constipation prior to opioid use and will likely benefit from lactulose in the future, but the goal is 1BM daily  If she continues to have loose stools, advised to stop lactulose and only take stool softener  Requested Prescriptions      No prescriptions requested or ordered in this encounter     There are no discontinued medications  Representatives have queried the patient's controlled substance dispensing history in the Prescription Drug Monitoring Program in compliance with regulations before I have prescribed any controlled substances    The prescription history is consistent with prescribed therapy and our practice policies  30 minutes were spent face to face with Dolores Cross and her sister with greater than 50% of the time spent in counseling or coordination of care including discussions of risks and benefits of treatment, instructions for disease self management, treatment instructions, follow up requirements and compliance with treatment regimen   All of the patient's questions were answered during this discussion  No follow-ups on file  Subjective:   Chief Complaint  Follow up visit for:  symptom management, pain, neoplasm related, assessment of goals of care, disease process education and discussion of prognosis  HPI     Dolores Cross is a 66 y o  female with stage 1B endometrial CA, comprehensively staged in 2017, s/p definitive surgical management with biopsy proven recurrence in July 2019 having now started chemotherapy (carboplatin and taxol q 21 days) on 7/30, DVT on Xarelto, right ureteral obstruction secondary to tumor burden s/p right nephrostomy tube on 7/26  Currently reports that since nephrostomy tube placement, pain has dramatically improved on the right back, flank and rlq  Pain has so significantly improved that patient no longer is taking the oxycodone ER at all and has dropped the oxycodone IR 10mg to 5mg PRN which she is only taking sporadically  Also, since stopping the oxycodone ER, patient has been having increasing daily BMs while continuing to take lactulose TID  This morning had an episode of loose stool  Reports that she has had no negative side effects from chemotherapy at this time except for increasing fatigue  Otherwise, reports that she is being hit with sadness regarding her diagnosis which she feels like was delayed  Does not believe that this is major depression, but just grieving the unfairness of the disease  Denies any suicidal ideations/ thoughts of self harm   Follows with a psychiatrist and a therapist  Next appointment with psychiatrist is on 8/9  Patient's next chemotherapy is scheduled for 8/19, and she will follow up in the office on 8/23 for symptom reassessment  The following portions of the medical history were reviewed: past medical history, problem list, medication list, and social history      Current Outpatient Medications:     ARIPiprazole (ABILIFY) 10 mg tablet, Take 10 mg by mouth daily, Disp: , Rfl:     aspirin (ECOTRIN LOW STRENGTH) 81 mg EC tablet, Take 81 mg by mouth daily, Disp: , Rfl:     cholecalciferol (VITAMIN D3) 1,000 units tablet, Take 1,000 Units by mouth daily, Disp: , Rfl:     dexmethylphenidate (FOCALIN XR) 10 MG 24 hr capsule, Take 10 mg by mouth daily, Disp: , Rfl:     Ferrous Sulfate (IRON) 28 MG TABS, Take by mouth, Disp: , Rfl:     furosemide (LASIX) 40 mg tablet, Take 1 tablet (40 mg total) by mouth 2 (two) times a day, Disp: 180 tablet, Rfl: 1    gemfibrozil (LOPID) 600 mg tablet, TAKE 1 TABLET BY MOUTH DAILY, Disp: 90 tablet, Rfl: 1    lactulose 20 g/30 mL, Take 15 mL (10 g total) by mouth 3 (three) times a day for 22 days, Disp: 946 mL, Rfl: 0    LORazepam (ATIVAN) 1 mg tablet, Take 1 tablet (1 mg total) by mouth every 6 (six) hours as needed for anxiety (or nausea), Disp: 36 tablet, Rfl: 1    metFORMIN (GLUCOPHAGE) 500 mg tablet, Take 1 tablet (500 mg total) by mouth daily, Disp: 180 tablet, Rfl: 1    MYRBETRIQ 50 MG TB24, TAKE 1 TABLET DAILY, Disp: 30 tablet, Rfl: 10    omeprazole (PriLOSEC) 20 mg delayed release capsule, Take 20 mg by mouth daily, Disp: , Rfl:     ondansetron (ZOFRAN) 8 mg tablet, Take 1 tablet (8 mg total) by mouth every 8 (eight) hours as needed for nausea or vomiting, Disp: 30 tablet, Rfl: 1    oxybutynin (DITROPAN-XL) 10 MG 24 hr tablet, TAKE 1 TABLET BY MOUTH EVERY DAY, Disp: 30 tablet, Rfl: 11    oxyCODONE (ROXICODONE) 5 mg immediate release tablet, Take 1 tablet (5 mg total) by mouth every 4 (four) hours as needed for severe pain for up to 30 daysMax Daily Amount: 30 mg, Disp: 90 tablet, Rfl: 0    potassium chloride (K-DUR,KLOR-CON) 20 mEq tablet, Take 1 tablet (20 mEq total) by mouth 2 (two) times a day, Disp: 180 tablet, Rfl: 1    quinapril (ACCUPRIL) 5 mg tablet, TAKE 1 TABLET BY MOUTH DAILY AT BEDTIME, Disp: 90 tablet, Rfl: 1    rivaroxaban (XARELTO) 20 mg tablet, Take 1 tablet (20 mg total) by mouth daily with breakfast, Disp: 30 tablet, Rfl: 4    sodium chloride, PF, 0 9 %, 10 mL by Intracatheter route daily for 30 days, Disp: 300 mL, Rfl: 3    venlafaxine (EFFEXOR) 75 mg tablet, Take 75 mg by mouth 3 (three) times a day  , Disp: , Rfl:     oxyCODONE (OxyCONTIN) 20 mg 12 hr tablet, Take 1 tablet (20 mg total) by mouth every 8 (eight) hours for 30 daysMax Daily Amount: 60 mg (Patient not taking: Reported on 8/2/2019), Disp: 90 tablet, Rfl: 0    senna (SENOKOT) 8 6 MG tablet, Take 2 tablets (17 2 mg total) by mouth 2 (two) times a day for 14 days, Disp: 56 tablet, Rfl: 0  Review of Systems   Constitutional: Positive for fatigue  Negative for chills and fever  Respiratory: Negative for chest tightness and shortness of breath  Cardiovascular: Negative for chest pain and palpitations  Gastrointestinal: Positive for abdominal pain, constipation and diarrhea  Negative for nausea and vomiting  Genitourinary: Positive for flank pain  Negative for dysuria and hematuria  Musculoskeletal: Positive for back pain  Neurological: Negative for dizziness, light-headedness and headaches  Psychiatric/Behavioral: Negative for suicidal ideas  Sadness       All other systems negative    Objective:  Vital Signs  /80 (BP Location: Right arm, Patient Position: Sitting, Cuff Size: Standard)   Pulse 93   Temp 98 3 °F (36 8 °C) (Oral)   Resp 16   Wt 94 3 kg (207 lb 12 8 oz)   SpO2 98%   BMI 41 97 kg/m²    Physical Exam    Constitutional: Appears well-developed and well-nourished  In no acute physical or emotional distress  Head: Normocephalic and atraumatic  Eyes: EOM are normal  No ocular discharge  No scleral icterus  Neck: No visible adenopathy or masses  Respiratory: Effort normal  No stridor  No respiratory distress  Gastrointestinal: No abdominal distension  Musculoskeletal: No edema  Neurological: Alert, oriented and appropriately conversant  Skin: No diaphoresis, no rashes seen on exposed areas of skin  Psychiatric: Displays a normal mood and flat affect (more animated and engaged than during prior visit)   Behavior, judgement and thought content appear normal

## 2019-08-02 NOTE — PROGRESS NOTES
Pt here for weekly central labs  Port flushed per protocol and labs drawn per order  Confirmed next appt for labs next week and AVS declined

## 2019-08-02 NOTE — TELEPHONE ENCOUNTER
Ayanna from [facility] calling in regards to patients Neph  Tube      Asking for a call back promptly ( she is with patient) at [phone]

## 2019-08-02 NOTE — TELEPHONE ENCOUNTER
PT HOME HEALTH NURSE LADARIUS WOULD LIKE A CALL BACK -[phone], SHE STATES PT WAS SEEN IN THE OFFICE ON 08/01/19 WITH DR Mercedez Dinero SAYS PT HAS NEPHROSTOMY TUBE AND WANTS TO KNOW WHAT THE ORDERS ARE OR IF THEY HAVE CHANGED

## 2019-08-05 ENCOUNTER — TELEPHONE (OUTPATIENT)
Dept: PALLIATIVE MEDICINE | Facility: CLINIC | Age: 64
End: 2019-08-05

## 2019-08-05 NOTE — TELEPHONE ENCOUNTER
This patient called, states she had a bowel movement this morning and would like to know if she should continue the Lactulose  Dr Neftaly Falcon is instructing for patient to continue Lactulose until she feels she is having normal reoccurring movements   For a few days consistantly  then she may switch to Miralax

## 2019-08-07 ENCOUNTER — TELEPHONE (OUTPATIENT)
Dept: UROLOGY | Facility: AMBULATORY SURGERY CENTER | Age: 64
End: 2019-08-07

## 2019-08-07 ENCOUNTER — DOCUMENTATION (OUTPATIENT)
Dept: RADIATION ONCOLOGY | Facility: HOSPITAL | Age: 64
End: 2019-08-07

## 2019-08-07 ENCOUNTER — TELEPHONE (OUTPATIENT)
Dept: UROLOGY | Facility: HOSPITAL | Age: 64
End: 2019-08-07

## 2019-08-07 NOTE — PROGRESS NOTES
Called the pt after review of her DT which showed a distress level of 6 with depression, nervousness, sadness, worry, loss of interest in usual activities, changes in urination, constipation, eating, getting around, mouth sores, pain and sleep listed as problems  Called the pt and introduced myself and role  Pt informed me she does have a psychologist she sees regularly with whom she is comfortable for the treatment of depression  Provided the pt with my contact info in the event she would like to speak with me

## 2019-08-07 NOTE — TELEPHONE ENCOUNTER
Received an order for [facility] for bags for Velez Chan, per note it stated that she had her nephrostomy tube capped  Cleve  and she said she was going on vacation and as a precaution she wanted to have bags    Order sent to [facility]

## 2019-08-07 NOTE — TELEPHONE ENCOUNTER
Call received from [facility] nurse, Sean Leija  She states that they have been seeing patient daily for flushes  [facility] nurse asking if it is okay for PICC line extension to be placed to nephrostomy so that patient will be able to reach to flush herself  Advised [facility] nurse that is fine

## 2019-08-09 ENCOUNTER — HOSPITAL ENCOUNTER (OUTPATIENT)
Dept: INFUSION CENTER | Facility: CLINIC | Age: 64
Discharge: HOME | End: 2019-08-09
Payer: COMMERCIAL

## 2019-08-09 DIAGNOSIS — C54.1 ENDOMETRIAL CANCER (HCC): ICD-10-CM

## 2019-08-09 LAB
ALBUMIN SERPL BCP-MCNC: 3.4 G/DL (ref 3.5–5)
ALP SERPL-CCNC: 123 U/L (ref 46–116)
ALT SERPL W P-5'-P-CCNC: 32 U/L (ref 12–78)
ANION GAP SERPL CALCULATED.3IONS-SCNC: 12 MMOL/L (ref 4–13)
AST SERPL W P-5'-P-CCNC: 18 U/L (ref 5–45)
BASOPHILS # BLD AUTO: 0.06 THOUSANDS/ΜL (ref 0–0.1)
BASOPHILS NFR BLD AUTO: 1 % (ref 0–1)
BILIRUB SERPL-MCNC: 0.4 MG/DL (ref 0.2–1)
BUN SERPL-MCNC: 20 MG/DL (ref 5–25)
CALCIUM SERPL-MCNC: 9.6 MG/DL (ref 8.3–10.1)
CHLORIDE SERPL-SCNC: 95 MMOL/L (ref 100–108)
CO2 SERPL-SCNC: 28 MMOL/L (ref 21–32)
CREAT SERPL-MCNC: 1.04 MG/DL (ref 0.6–1.3)
EOSINOPHIL # BLD AUTO: 0.09 THOUSAND/ΜL (ref 0–0.61)
EOSINOPHIL NFR BLD AUTO: 2 % (ref 0–6)
ERYTHROCYTE [DISTWIDTH] IN BLOOD BY AUTOMATED COUNT: 13.1 % (ref 11.6–15.1)
GFR SERPL CREATININE-BSD FRML MDRD: 58 ML/MIN/1.73SQ M
GLUCOSE SERPL-MCNC: 119 MG/DL (ref 65–140)
HCT VFR BLD AUTO: 31.6 % (ref 34.8–46.1)
HGB BLD-MCNC: 10.3 G/DL (ref 11.5–15.4)
IMM GRANULOCYTES # BLD AUTO: 0.03 THOUSAND/UL (ref 0–0.2)
IMM GRANULOCYTES NFR BLD AUTO: 1 % (ref 0–2)
LYMPHOCYTES # BLD AUTO: 1.32 THOUSANDS/ΜL (ref 0.6–4.47)
LYMPHOCYTES NFR BLD AUTO: 27 % (ref 14–44)
MAGNESIUM SERPL-MCNC: 1.6 MG/DL (ref 1.6–2.6)
MCH RBC QN AUTO: 28 PG (ref 26.8–34.3)
MCHC RBC AUTO-ENTMCNC: 32.6 G/DL (ref 31.4–37.4)
MCV RBC AUTO: 86 FL (ref 82–98)
MONOCYTES # BLD AUTO: 1.24 THOUSAND/ΜL (ref 0.17–1.22)
MONOCYTES NFR BLD AUTO: 25 % (ref 4–12)
NEUTROPHILS # BLD AUTO: 2.14 THOUSANDS/ΜL (ref 1.85–7.62)
NEUTS SEG NFR BLD AUTO: 44 % (ref 43–75)
NRBC BLD AUTO-RTO: 0 /100 WBCS
PLATELET # BLD AUTO: 385 THOUSANDS/UL (ref 149–390)
PMV BLD AUTO: 9.5 FL (ref 8.9–12.7)
POTASSIUM SERPL-SCNC: 3.1 MMOL/L (ref 3.5–5.3)
PROT SERPL-MCNC: 8.2 G/DL (ref 6.4–8.2)
RBC # BLD AUTO: 3.68 MILLION/UL (ref 3.81–5.12)
SODIUM SERPL-SCNC: 135 MMOL/L (ref 136–145)
WBC # BLD AUTO: 4.88 THOUSAND/UL (ref 4.31–10.16)

## 2019-08-09 PROCEDURE — 85025 COMPLETE CBC W/AUTO DIFF WBC: CPT

## 2019-08-09 PROCEDURE — 83735 ASSAY OF MAGNESIUM: CPT

## 2019-08-09 PROCEDURE — 80053 COMPREHEN METABOLIC PANEL: CPT

## 2019-08-12 ENCOUNTER — TELEPHONE (OUTPATIENT)
Dept: UROLOGY | Facility: MEDICAL CENTER | Age: 64
End: 2019-08-12

## 2019-08-12 NOTE — TELEPHONE ENCOUNTER
FYI    Patient left message with answer service  Paged to Dr Patrice Jimenez    Patient has nephrostomy tube that is getting red with puss

## 2019-08-12 NOTE — TELEPHONE ENCOUNTER
Dr Fatou Prajapati had called had told her that symptoms normal - she is to monitor pus and if she gets a fever to go to ER

## 2019-08-13 DIAGNOSIS — R30.0 DYSURIA: Primary | ICD-10-CM

## 2019-08-13 RX ORDER — CIPROFLOXACIN 500 MG/1
500 TABLET, FILM COATED ORAL 2 TIMES DAILY
Qty: 10 TABLET | Refills: 0 | Status: SHIPPED | OUTPATIENT
Start: 2019-08-13 | End: 2019-08-18

## 2019-08-13 NOTE — TELEPHONE ENCOUNTER
Called and talked with Jose Daniel Barton - she is down at [address] as I was going to have her get a urine test   Is there anything we can do for patient   She has been calling in [facility] with issues

## 2019-08-13 NOTE — TELEPHONE ENCOUNTER
Patient calling to advise she is experiencing burning during urination  Asking for a call back to discuss      She can be reached at [phone]

## 2019-08-13 NOTE — TELEPHONE ENCOUNTER
Ideally would want urine testing, however if patient is unable to obtain a urine specimen while out of town, a short course of antibiotics can be sent to her pharmacy of choice  Should she have persistent symptoms thereafter she would need urine specimen  Thank you

## 2019-08-15 NOTE — PROGRESS NOTES
Outpatient Oncology Nutrition Consult  Type of Consult: Initial Consult  Care Location: [facility]    Reason for referral: Malnutrition Screening Tool (MST) Triggers: scored a 2 indicating 2-13# (0 9-6 kg) recent wt loss and is eating poorly due to a decreased appetite  (Date of MST: 7/30/19) and RN Meche Romo  Infusion RN) request due to pt reports really having to 'force' herself to eat and has lost 'at least' 10# in the past month  She reports having no appetite at all  (Date of referral: 7/30/19)  Nutrition Assessment:  PMH: anemia, depression, DM, HLD, HTN, obesity, RNYGB (2001 at [place] [address])  Meds include: iron 28 mg, Lasix, 1000 IU vitamin D3, lactulose, metformin, senna, zofran prn  Oncology Diagnosis & Treatments: Endometrial cancer  S/p surgery 12/19/17  Recurrence 7/8/19  Undergoing chemotherapy (started 7/30/19; receiving carboplatin, taxol)  Then for potential radiation therapy  Endometrial cancer ([facility] [address] )    11/17/2017 Initial Diagnosis     Endometrial cancer ([facility] [address] )      12/19/2017 Surgery     Robotic assisted total laparoscopic hysterectomy with bilateral salpingo-oophorectomy and sentinel bilateral pelvic lymph node dissection  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 (4 4 x 3 2 cm tumor, 9 4/15 4mm invasion, NO LVSI, washings revealed atypical cellular changes)      12/19/2017 Genetic Testing     Morrison testing negative      7/8/2019 Recurrence     Presented with right lower extremity DVT and CT demonstrating right pelvic sidewall mass with venous, ureteral and nerve compression causing significant neuropathic pain  Core biopsy demonstrates high-grade carcinoma        7/30/2019 -  Chemotherapy     CARBOplatin (PARAPLATIN) 574 2 mg in sodium chloride 0 9 % 250 mL IVPB, 574 2 mg, Intravenous, Once, 1 of 6 cycles  Administration: 574 2 mg (7/30/2019)  PACLitaxel (TAXOL) 337 8 mg in sodium chloride 0 9 % 500 mL chemo IVPB, 175 mg/m2 = 337 8 mg, Intravenous, Once, 1 of 6 cycles  Administration: 337 8 mg (7/30/2019)       Past Medical History:   Diagnosis Date    Anemia     Depression     Diabetes mellitus ([facility] [address] )     Endometrial cancer ([facility] [address] ) 12/2017    Hyperglycemia     vx type 2 dm -- last assessed 4/1/14; resolved 11/7/17    Hyperlipidemia     Hypertension     Obesity     last assessed 10/14/17; resolved 11/7/17     Past Surgical History:   Procedure Laterality Date    ABDOMINAL SURGERY      GASTRIC BYPASS    CHOLECYSTECTOMY      at the time of gastric bypass    COLONOSCOPY      CT NEEDLE BIOPSY LYMPH NODE  7/8/2019    GASTRIC BYPASS      HYSTERECTOMY Bilateral     total abdominal with salpingo-oophorectomy    IR PORT PLACEMENT  7/26/2019    IR TUBE PLACEMENT NEPHROSTOMY  7/26/2019    OOPHORECTOMY Bilateral     OH LAP, RADICAL HYST W/ TUBE&OV, NODE BX N/A 12/19/2017    Procedure: HYSTERECTOMY LAPAROSCOPIC TOTAL ([address]) W/ ROBOTICS; BILATERAL SALPINGOOPHERECTOMY; LYMPH NODE DISSECTION; lysis of adhesions;  Surgeon: Matthias Richardson MD;  [facility];  Service: Gynecology Oncology    OH LAP,DIAGNOSTIC ABDOMEN N/A 12/19/2017    Procedure: LAPAROSCOPY DIAGNOSTIC;  Surgeon: Matthias Richardson MD;  [facility];  Service: Gynecology Oncology    TONSILLECTOMY      US GUIDED BREAST BIOPSY RIGHT COMPLETE Right 6/28/2019       Review of Medications:   Vitamins, Supplements and Herbals: yes: Centrum MVI daily, Vitamin D 1000 IU, Iron 28 mg; not taking calcium or B12 supplement as recommended s/p RNYGB    Current Outpatient Medications:     ARIPiprazole (ABILIFY) 10 mg tablet, Take 10 mg by mouth daily, Disp: , Rfl:     aspirin (ECOTRIN LOW STRENGTH) 81 mg EC tablet, Take 81 mg by mouth daily, Disp: , Rfl:     cholecalciferol (VITAMIN D3) 1,000 units tablet, Take 1,000 Units by mouth daily, Disp: , Rfl:     dexmethylphenidate (FOCALIN XR) 10 MG 24 hr capsule, Take 10 mg by mouth daily, Disp: , Rfl:     Ferrous Sulfate (IRON) 28 MG TABS, Take by mouth, Disp: , Rfl:     furosemide (LASIX) 40 mg tablet, Take 1 tablet (40 mg total) by mouth 2 (two) times a day, Disp: 180 tablet, Rfl: 1    gemfibrozil (LOPID) 600 mg tablet, TAKE 1 TABLET BY MOUTH DAILY, Disp: 90 tablet, Rfl: 1    lactulose 20 g/30 mL, Take 15 mL (10 g total) by mouth 3 (three) times a day for 22 days, Disp: 946 mL, Rfl: 0    LORazepam (ATIVAN) 1 mg tablet, Take 1 tablet (1 mg total) by mouth every 6 (six) hours as needed for anxiety (or nausea), Disp: 36 tablet, Rfl: 1    metFORMIN (GLUCOPHAGE) 500 mg tablet, Take 1 tablet (500 mg total) by mouth daily, Disp: 180 tablet, Rfl: 1    MYRBETRIQ 50 MG TB24, TAKE 1 TABLET DAILY, Disp: 30 tablet, Rfl: 10    omeprazole (PriLOSEC) 20 mg delayed release capsule, Take 20 mg by mouth daily, Disp: , Rfl:     ondansetron (ZOFRAN) 8 mg tablet, Take 1 tablet (8 mg total) by mouth every 8 (eight) hours as needed for nausea or vomiting, Disp: 30 tablet, Rfl: 1    oxybutynin (DITROPAN-XL) 10 MG 24 hr tablet, TAKE 1 TABLET BY MOUTH EVERY DAY, Disp: 30 tablet, Rfl: 11    potassium chloride (K-DUR,KLOR-CON) 20 mEq tablet, Take 1 tablet (20 mEq total) by mouth 2 (two) times a day, Disp: 180 tablet, Rfl: 1    quinapril (ACCUPRIL) 5 mg tablet, TAKE 1 TABLET BY MOUTH DAILY AT BEDTIME, Disp: 90 tablet, Rfl: 1    rivaroxaban (XARELTO) 20 mg tablet, Take 1 tablet (20 mg total) by mouth daily with breakfast, Disp: 30 tablet, Rfl: 4    senna (SENOKOT) 8 6 MG tablet, Take 2 tablets (17 2 mg total) by mouth 2 (two) times a day for 14 days, Disp: 56 tablet, Rfl: 0    sodium chloride, PF, 0 9 %, 10 mL by Intracatheter route daily for 30 days, Disp: 300 mL, Rfl: 3    venlafaxine (EFFEXOR) 75 mg tablet, Take 75 mg by mouth 3 (three) times a day  , Disp: , Rfl:     Most Recent Lab Results: Does not check BG    Lab Results   Component Value Date    WBC 10 09 08/17/2019    CHOL 183 10/27/2017    TRIG 88 11/01/2018    HDL 59 11/01/2018    LDLCALC 106 (H) 10/27/2017    ALT 27 08/17/2019    AST 20 08/17/2019     10/27/2017     05/12/2017    K 3 6 08/17/2019    K 3 1 (L) 08/09/2019    BUN 14 08/17/2019    BUN 20 08/09/2019    CREATININE 0 93 08/17/2019    CREATININE 1 04 08/09/2019    PHOS 3 2 07/08/2019    GLUCOSE 219 (H) 12/19/2017    GLUCOSE 111 (H) 10/27/2017    HGBA1C 5 5 11/01/2018    HGBA1C 5 6 03/12/2018    HGBA1C 5 6 10/27/2017    CALCIUM 9 6 08/17/2019    MG 1 7 08/17/2019       Anthropometric Measurements:   Height: 59"  Ht Readings from Last 1 Encounters:   08/19/19 4' 11" (1 499 m)     -Weight History:   Usual Weight: 270# before RNYGB in 2001; lowest post-op wt: 200#; wt typically fluctuates between 215-230#  Ideal Body Weight: 95#  Wt Readings from Last 20 Encounters:   08/19/19 90 7 kg (200 lb)   08/02/19 94 3 kg (207 lb 12 8 oz)   08/01/19 95 9 kg (211 lb 8 oz)   07/30/19 96 6 kg (213 lb)   07/26/19 100 kg (221 lb)   07/22/19 100 kg (221 lb)   07/19/19 101 kg (222 lb 12 8 oz)   07/18/19 101 kg (222 lb)   07/16/19 101 kg (223 lb)   07/07/19 102 kg (224 lb 13 9 oz)   07/07/19 102 kg (224 lb 13 9 oz)   06/28/19 104 kg (230 lb)   06/24/19 106 kg (234 lb)   06/20/19 106 kg (233 lb)   06/19/19 106 kg (233 lb)   06/12/19 107 kg (235 lb)   06/03/19 104 kg (230 lb)   05/29/19 107 kg (236 lb)   05/20/19 105 kg (232 lb)   05/15/19 105 kg (232 lb)     Weight Changes: loss of 7 8# (3 8%) in 2 weeks (not significant); loss of 22 8# (10 2%) in 1 month (significant); and loss of 32# (13 8%) in 3 months (significant)  Estimated body mass index is 40 39 kg/m² as calculated from the following:    Height as of an earlier encounter on 8/19/19: 4' 11" (1 499 m)  Weight as of an earlier encounter on 8/19/19: 90 7 kg (200 lb)      Nutrition-Focused Physical Findings: none observed    Food/Nutrition-Related History & Client/Social History:    Current Nutrition Impact Symptoms:  [x] Nausea - mild yesterday, zofran was effective [x] Reduced Appetite - reduced from normal, appetite improved overall; thinks that eating too quickly inhibits ability to eat sufficient quantities due to excessive fullness  She will now focus on making sure to eat slowly and chew thoroughly  [] Acid Reflux    [] Vomiting  [x] Unintended Wt Loss  [] Malabsorption    [] Diarrhea - only after taking Lactulose [] Unintended Wt Gain  [] Dumping Syndrome - denies but does avoid high sugar foods/fluids   [x] Constipation - taking Senna and Lactulose which are not always effective; BM's are typically every other day or two, BM's are very hard  Has never had regular BM's  [] Thick Mucous/Secretions  [] Abdominal Pain    [x] Dysgeusia (Altered Taste) - improved; does best with very strong flavors [x] Xerostomia (Dry Mouth) - pt thinks this is related to all the medications she is taking  Using Biotene toothpaste and mouthwash  Suggested trying dry mouth spray and sucking on sugar-free hard candy  [] Gas    [] Dysosmia (Altered Smell)  [] Sharlette Hashimoto  [] Difficulty Chewing    [] Oral Mucositis (Sore Mouth) - resolved [] Fatigue   [] Other:    [] Odynophagia   [] Esophagitis  [] Other:    [] Dysphagia  [] Early Satiety  [] No Problems Eating      Food Allergies: yes: had skin patch testing which showed allergy to strawberries, but is able to eat strawberries without any side effects  Food Intolerances: no    Current Diet: Regular Diet, No Restrictions; avoids high sugar foods/fluids as they trigger nausea since RNYGB  Current Nutrition Intake: Less than usual but this has been improving  Appetite: Good but decreased from normal   Typically eats too fast and gets full; trying to eat more slowly which allows her to eat more  Nutrition Route: PO  Meal planning/preparation mainly done by: mother does cooking; sister does shopping  Oral Care: Using Biotene toothpaste and mouth rinse  Brushes teeth daily  Activity level: Has been very sedentary  Did see a PT and is doing the exercises recommended  Walks with a cane    Tumor affects right leg  Has been more weak from the weight loss  Social Hx: 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 to take a break from work during cancer tx  Lives with mom who is very supportive  Sister lives in [place]  Brother lives in [place]  24 Hr Diet Recall:   Breakfast: 1 Glucerna, water; yesterday: peanut butter and jelly sandwich on 2 slices of honey wheat bread  Lunch: Kraft macaroni and cheese (1 5 cups), cucumber salad  Dinner: ordered out: [employer] (very large pc) and tomato sauce  Snacks: 1pm: 1 peach; 4pm: fresh fruit salad with cherries, blueberries, pineapples, strawberries; 10 pm: cucumber salad    Supplements: Has Glucerna and Ensure Original at home - has been drinking 1 per day - thinks these have a very strong flavor; suggested drinking over ice and/or adding milk  Beverages: water (16 9 oz bottle - at least 3-4, aims for 4-5), Glucerna/Ensure (8 oz/day), sweetened iced tea (1 cup occasionally); does not drink coffee or alcohol  Does not separate fluids from solids, does not have discomfort while eating and drinking at the same time  Estimated Nutrition Needs: (based on 55 1kg/ 121 3# adjusted wt)    Energy Needs: 7611-2532 kcal/day (30-35 kcal/kg)  Protein Needs:  grams/day (1 5-2 g/kg)  Fluid Needs: 3501-1719 mL/day (1 mL/kcal) - 55-64 oz    Discussion/Summary: Zuleima Quiñones is here today for chemotherapy and initial RD consultation  She has been struggling with significant wt loss, reduced appetite, xerostomia, dysgeusia, and constipation, although, some of these nutrition impact sx have improved recently  She has a hx of RNY Gastric Bypass in 2001 and she has not been supplementing with the appropriate vitamins/minerals  Reviewed life-long vitamin/mineral supplementation recommendations as they related to weight loss surgery    She has been trying to supplement with Ensure and Glucerna recently but finds these to have too strong of a flavor  Suggested drinking supplements over ice and/or adding milk to them to help improve the flavor; using them as snacks rather than meal replacements; trying different varieties; and increasing supplements to BID to assist with weight stabilization  She thinks that her appetite and po intake have recently improved but she is still losing significant amounts of wt  She is trying to eat slowly due to excessive fullness when eating too quickly  She is making sure to eat 3 meals per day, but her snacks consists mainly of fruits and vegetables and lack protein  Reviewed multiple different snack options to boost kcal and protein intake while still incorporating fruits and vegetables  She has been struggling with constipation for most of her life and she plans to discuss her medication usage with her Palliative Care physician  Suggested boosting fluid and fiber intake and trying Apple/Prune sauce at HS to help regulate BM's  Unknown Mick is agreeable to trying some of the recommendations provided today  We will follow up in approximately 6 weeks and she agrees to contact me in the meantime with any nutrition-related questions or concerns      Discussed/Reviewed:   · the importance of weight maintenance during CA tx  · indications & use of oral nutrition supplements - increase to BID, try different varieties, pour over ice and/or add milk to improve flavor  · how to modify foods for anticipated nutrition impact symptoms pt may experience during CA tx  · high protein foods to include at all meals and snacks    · ways to increase overall calorie intake    · encouraged eating every 2-3 hours (5-6 small meals/day)  · maintaining proper oral care - Start bs/sw rinses; brush teeth BID  · adequate hydation & tips to increase overall fluid intake    · MNT for: RNY Gastric Bypass, dysgeusia, wt loss, xerostomia, constipation  · recipe suggestions/resources    All questions and concerns addressed during todays visit  Iva Rao has RD contact information  Nutrition Diagnosis:  · Inadequate Energy Intake related to physiological causes, disease state and treatment related issues as evidenced by food recall, wt loss and discussion with pt and/or family  · Increased Nutrient Needs (kcal & pro) related to increased demand for nutrients and disease state as evidenced by cancer dx and pt undergoing tx for cancer  · Patient has clinical indicators (or ASPEN criteria) consistent with severe protein-calorie malnutrition in the context of Chronic Illness as evidenced by >5% wt loss in 1 month and </=75% energy intake vs  Estimated needs for >/=1 month  Intervention & Recommendations:  Topics addressed: Nutrition education, Balance/Variety, Meals & Snacks, Meal planning, Choosing high protein meals/snacks, Meal pattern: eating small/frequent meals (every 2-3 hours), Nutrition Symptom Management, Adequate Hydration, Medical Food Supplements, Nutrition-Related Medication Management, Vitamin & Mineral Supplements and Weight Management    Barriers: None  Readiness to change: action  Comprehension: verbalizes understanding  Expected Compliance: good    Materials Provided: Ensure samples, Ensure Coupons, Boost samples, Boost Coupons, Unjury samples and ENU samples      Monitoring & Evaluation:  Dietitian to Monitor: Food and Nutrition Intake, Nutrtion Impact Symptoms, Body Weight, Vitamin/Mineral Intake and Biochemical Data     Goals:  · weight stabilization  · adequate nutrition related symptom management  · pt to meet >/=75% estimated nutrition needs daily    · Progress Towards Goals: Initiated    Nutrition Rx & Recommendations:  · Diet: High Calorie, High Protein (for high calorie foods see pages 52-53, and for high protein foods see pages 49-51 in your Eating Hints book)  · Keep a daily food journal (pen/paper, dedra such as Patients Know Best)  · Nutrition Supplements: Increase supplements to 2 times per day    Try supplements over ice and/or mix with milk to improve flavor, use supplements as a snack rather than a meal   May use homemade shakes/smoothies as desired  If using a pre-made shake/bar, choose ones with >300 calories and >10 grams protein (ex  Ensure Enlive, Ensure Plus, Boost Plus, Boost Very High Calorie, etc )  · Small, frequent meals/snacks may be easier to tolerate than 3 large daily meals  Aim for 5-6 small meals per day (every 2-3 hours)  · Include protein at all meals/snacks  · Include a variety of foods (as tolerated/allowed by diet)  · Stay hydrated by sipping fluids of choice/tolerance throughout the day  · Alter food choices and eating patterns to accommodate changing needs  · Refer to Eating Hints book for other meal/snack ideas and symptom management  · Follow proper oral care; Try baking soda/salt water rinse recipe (mix 3/4 tsp salt + 1 tsp baking soda + 1 qt water; rinse with pain water after using) in Eating Hints book (pg 18)  Brush your teeth before/after meals & before bed  · For lack of taste: Practice good oral care  Blend fresh fruits into shakes, ice cream or yogurt  Eat frozen fruits: grapes, chopped cantaloupe, watermelon  Select fresh vegetables (may be more appealing than canned/frozen)  Add extra flavor to foods: experiment with spices, salt & sweeteners, extra oil/butter, acidic foods; marinate meats (see pages 29-30 in your Eating Hints book)  · For appetite loss: try powdered or liquid nutrition supplements; eating by the clock every 2-3 hours; set a timer to remind yourself to eat, keep snacks nearby; add extra protein & calories to your diet; drink liquids in between meals, choose liquids that add calories; eat a bedtime snack; eat soft, cool or frozen foods; eat a larger meal when you feel well & rested; only sip small amounts of liquids during meals (see pages 10-12 in your Eating Hints book)    · For constipation: drink plenty of fluids (>64 oz/day); drink hot liquids; eat high-fiber foods as tolerated (whole grains, beans, peas, nuts, seeds, fruits, vegetables, etc); increase physical activity as tolerated  Avoid increasing fiber intake too quickly, add fiber into your diet slowly; keep a record of your bowel movements (see pages 13-14 in you Eating Hints book)  · For dry mouth: sip water throughout the day; try very sweet (or tart, as tolerated) drinks; chew gum or suck on hard candy, popsicles, & ice chips; eat easy to swallow foods (such as pureed cooked foods or soups); moisten food with sauce/gravy/salad dressing; do not drink beer, wine, or any type of alcohol; keep lips moist with lip balm; avoid tobacco products & second-hand smoke; try Biotene as needed (see pages 17-18 in your Eating Hints book)  Follow proper oral care; Try baking soda/salt water rinse recipe (mix 3/4 tsp salt + 1 tsp baking soda + 1 qt water; rinse with pain water after using) in Eating Hints book (pg 18)  Brush your teeth before/after meals & before bed  · Weigh yourself regularly  If you notice weight loss, make an effort to increase your daily food/calorie intake  If you continue to notice loss after these efforts, reach out to your dietitian to establish a plan to stabilize weight  · High protein foods to try: [place] yogurt, cheese stick, pudding, peanut butter, nuts, rolled up chicken/turkey/ham, hummus (see pages 49-51 in your Eating Hints book)   · Add protein to snacks as discussed above  · Try Apple Prune sauce recipe on page 14 of your Eating Hints Book; always drink 8 oz of water with your dose and take it before bed each night  · Make sure to eat slowly and chew thoroughly    · Lifetime supplements recommended for Gastric Bypass:  · Multivitamin BID  · Iron daily  · Vitamin D daily  · Calcium Citrate 500 mg TID (do not take with iron, needs to be 2 hrs apart)  · B12 1000 mcg daily    Follow Up Plan: 9/30/19 during infusion  Recommend Referral to Other Providers: none at this time

## 2019-08-16 ENCOUNTER — TELEPHONE (OUTPATIENT)
Dept: GYNECOLOGIC ONCOLOGY | Facility: CLINIC | Age: 64
End: 2019-08-16

## 2019-08-16 RX ORDER — PALONOSETRON 0.05 MG/ML
0.25 INJECTION, SOLUTION INTRAVENOUS ONCE
Status: CANCELLED | OUTPATIENT
Start: 2019-08-19

## 2019-08-16 RX ORDER — SODIUM CHLORIDE 9 MG/ML
20 INJECTION, SOLUTION INTRAVENOUS ONCE
Status: CANCELLED | OUTPATIENT
Start: 2019-08-19

## 2019-08-16 NOTE — TELEPHONE ENCOUNTER
Patient is feeling well prior to cycle #2 of chemotherapy  No concerns or complaints  She is on her way home from vacation and plans to go for lab work tomorrow  Will review on Monday prior to chemotherapy treatment  Plan to return to the office as per her chemo calendar

## 2019-08-17 ENCOUNTER — HOSPITAL ENCOUNTER (OUTPATIENT)
Dept: INFUSION CENTER | Facility: CLINIC | Age: 64
Discharge: HOME | End: 2019-08-17
Payer: COMMERCIAL

## 2019-08-17 DIAGNOSIS — C54.1 ENDOMETRIAL CANCER (HCC): Primary | ICD-10-CM

## 2019-08-17 DIAGNOSIS — Z45.2 ENCOUNTER FOR CENTRAL LINE CARE: ICD-10-CM

## 2019-08-17 LAB
ALBUMIN SERPL BCP-MCNC: 3.3 G/DL (ref 3.5–5)
ALP SERPL-CCNC: 111 U/L (ref 46–116)
ALT SERPL W P-5'-P-CCNC: 27 U/L (ref 12–78)
ANION GAP SERPL CALCULATED.3IONS-SCNC: 12 MMOL/L (ref 4–13)
AST SERPL W P-5'-P-CCNC: 20 U/L (ref 5–45)
BASOPHILS # BLD MANUAL: 0 THOUSAND/UL (ref 0–0.1)
BASOPHILS NFR MAR MANUAL: 0 % (ref 0–1)
BILIRUB SERPL-MCNC: 0.3 MG/DL (ref 0.2–1)
BUN SERPL-MCNC: 14 MG/DL (ref 5–25)
CALCIUM SERPL-MCNC: 9.6 MG/DL (ref 8.3–10.1)
CHLORIDE SERPL-SCNC: 99 MMOL/L (ref 100–108)
CO2 SERPL-SCNC: 27 MMOL/L (ref 21–32)
CREAT SERPL-MCNC: 0.93 MG/DL (ref 0.6–1.3)
EOSINOPHIL # BLD MANUAL: 0 THOUSAND/UL (ref 0–0.4)
EOSINOPHIL NFR BLD MANUAL: 0 % (ref 0–6)
ERYTHROCYTE [DISTWIDTH] IN BLOOD BY AUTOMATED COUNT: 13.4 % (ref 11.6–15.1)
GFR SERPL CREATININE-BSD FRML MDRD: 67 ML/MIN/1.73SQ M
GLUCOSE P FAST SERPL-MCNC: 104 MG/DL (ref 65–99)
GLUCOSE SERPL-MCNC: 104 MG/DL (ref 65–140)
HCT VFR BLD AUTO: 32 % (ref 34.8–46.1)
HGB BLD-MCNC: 10.2 G/DL (ref 11.5–15.4)
HYPERCHROMIA BLD QL SMEAR: PRESENT
LYMPHOCYTES # BLD AUTO: 2.83 THOUSAND/UL (ref 0.6–4.47)
LYMPHOCYTES # BLD AUTO: 28 % (ref 14–44)
MAGNESIUM SERPL-MCNC: 1.7 MG/DL (ref 1.6–2.6)
MCH RBC QN AUTO: 28 PG (ref 26.8–34.3)
MCHC RBC AUTO-ENTMCNC: 31.9 G/DL (ref 31.4–37.4)
MCV RBC AUTO: 88 FL (ref 82–98)
MONOCYTES # BLD AUTO: 0.71 THOUSAND/UL (ref 0–1.22)
MONOCYTES NFR BLD: 7 % (ref 4–12)
NEUTROPHILS # BLD MANUAL: 6.56 THOUSAND/UL (ref 1.85–7.62)
NEUTS SEG NFR BLD AUTO: 65 % (ref 43–75)
NRBC BLD AUTO-RTO: 0 /100 WBCS
PLATELET # BLD AUTO: 326 THOUSANDS/UL (ref 149–390)
PLATELET BLD QL SMEAR: ADEQUATE
PMV BLD AUTO: 8.7 FL (ref 8.9–12.7)
POTASSIUM SERPL-SCNC: 3.6 MMOL/L (ref 3.5–5.3)
PROT SERPL-MCNC: 8.1 G/DL (ref 6.4–8.2)
RBC # BLD AUTO: 3.64 MILLION/UL (ref 3.81–5.12)
RBC MORPH BLD: PRESENT
SODIUM SERPL-SCNC: 138 MMOL/L (ref 136–145)
TOTAL CELLS COUNTED SPEC: 100
WBC # BLD AUTO: 10.09 THOUSAND/UL (ref 4.31–10.16)

## 2019-08-17 PROCEDURE — 80053 COMPREHEN METABOLIC PANEL: CPT

## 2019-08-17 PROCEDURE — 85007 BL SMEAR W/DIFF WBC COUNT: CPT

## 2019-08-17 PROCEDURE — 83735 ASSAY OF MAGNESIUM: CPT

## 2019-08-17 PROCEDURE — 85027 COMPLETE CBC AUTOMATED: CPT

## 2019-08-17 NOTE — PLAN OF CARE
Problem: Potential for Falls  Goal: Patient will remain free of falls  Description  INTERVENTIONS:  - Assess patient frequently for physical needs  -  Identify cognitive and physical deficits and behaviors that affect risk of falls    -  [place] fall precautions as indicated by assessment   - Educate patient/family on patient safety including physical limitations  - Instruct patient to call for assistance with activity based on assessment  - Modify environment to reduce risk of injury  - Consider OT/PT consult to assist with strengthening/mobility  Outcome: Progressing

## 2019-08-17 NOTE — PROGRESS NOTES
Pt  Resting Comfortably  Port accessed, labs drawn, port saline-locked and deaccessed without issue  Pt tolerated procedure well  Aware of next appt  AVS declined

## 2019-08-19 ENCOUNTER — NUTRITION (OUTPATIENT)
Dept: NUTRITION | Facility: CLINIC | Age: 64
End: 2019-08-19

## 2019-08-19 ENCOUNTER — HOSPITAL ENCOUNTER (OUTPATIENT)
Dept: INFUSION CENTER | Facility: CLINIC | Age: 64
Discharge: HOME | End: 2019-08-19
Payer: COMMERCIAL

## 2019-08-19 VITALS
OXYGEN SATURATION: 98 % | DIASTOLIC BLOOD PRESSURE: 64 MMHG | RESPIRATION RATE: 16 BRPM | SYSTOLIC BLOOD PRESSURE: 98 MMHG | HEART RATE: 93 BPM | BODY MASS INDEX: 40.32 KG/M2 | HEIGHT: 59 IN | WEIGHT: 200 LBS | TEMPERATURE: 97.8 F

## 2019-08-19 DIAGNOSIS — C54.1 ENDOMETRIAL CANCER (HCC): Primary | ICD-10-CM

## 2019-08-19 DIAGNOSIS — Z71.3 NUTRITIONAL COUNSELING: Primary | ICD-10-CM

## 2019-08-19 PROCEDURE — 96415 CHEMO IV INFUSION ADDL HR: CPT

## 2019-08-19 PROCEDURE — 96417 CHEMO IV INFUS EACH ADDL SEQ: CPT

## 2019-08-19 PROCEDURE — 96413 CHEMO IV INFUSION 1 HR: CPT

## 2019-08-19 PROCEDURE — 96375 TX/PRO/DX INJ NEW DRUG ADDON: CPT

## 2019-08-19 PROCEDURE — 96367 TX/PROPH/DG ADDL SEQ IV INF: CPT

## 2019-08-19 RX ORDER — PALONOSETRON 0.05 MG/ML
0.25 INJECTION, SOLUTION INTRAVENOUS ONCE
Status: COMPLETED | OUTPATIENT
Start: 2019-08-19 | End: 2019-08-19

## 2019-08-19 RX ORDER — SODIUM CHLORIDE 9 MG/ML
20 INJECTION, SOLUTION INTRAVENOUS ONCE
Status: COMPLETED | OUTPATIENT
Start: 2019-08-19 | End: 2019-08-19

## 2019-08-19 RX ADMIN — SODIUM CHLORIDE 150 MG: 0.9 INJECTION, SOLUTION INTRAVENOUS at 10:01

## 2019-08-19 RX ADMIN — FAMOTIDINE 20 MG: 10 INJECTION INTRAVENOUS at 09:37

## 2019-08-19 RX ADMIN — PACLITAXEL 327 MG: 6 INJECTION, SOLUTION INTRAVENOUS at 10:40

## 2019-08-19 RX ADMIN — PALONOSETRON 0.25 MG: 0.05 INJECTION, SOLUTION INTRAVENOUS at 09:58

## 2019-08-19 RX ADMIN — SODIUM CHLORIDE 20 ML/HR: 0.9 INJECTION, SOLUTION INTRAVENOUS at 08:33

## 2019-08-19 RX ADMIN — DIPHENHYDRAMINE HYDROCHLORIDE 25 MG: 50 INJECTION, SOLUTION INTRAMUSCULAR; INTRAVENOUS at 09:17

## 2019-08-19 RX ADMIN — CARBOPLATIN 553.2 MG: 10 INJECTION, SOLUTION INTRAVENOUS at 13:45

## 2019-08-19 RX ADMIN — DEXAMETHASONE SODIUM PHOSPHATE 20 MG: 10 INJECTION, SOLUTION INTRAMUSCULAR; INTRAVENOUS at 08:56

## 2019-08-19 NOTE — PATIENT INSTRUCTIONS
Nutrition Rx & Recommendations:  · Diet: High Calorie, High Protein (for high calorie foods see pages 52-53, and for high protein foods see pages 49-51 in your Eating Hints book)  · Keep a daily food journal (pen/paper, dedra such as B2Brev)  · Nutrition Supplements: Increase supplements to 2 times per day  Try supplements over ice and/or mix with milk to improve flavor, use supplements as a snack rather than a meal   May use homemade shakes/smoothies as desired  If using a pre-made shake/bar, choose ones with >300 calories and >10 grams protein (ex  Ensure Enlive, Ensure Plus, Boost Plus, Boost Very High Calorie, etc )  · Small, frequent meals/snacks may be easier to tolerate than 3 large daily meals  Aim for 5-6 small meals per day (every 2-3 hours)  · Include protein at all meals/snacks  · Include a variety of foods (as tolerated/allowed by diet)  · Stay hydrated by sipping fluids of choice/tolerance throughout the day  · Alter food choices and eating patterns to accommodate changing needs  · Refer to Eating Hints book for other meal/snack ideas and symptom management  · Follow proper oral care; Try baking soda/salt water rinse recipe (mix 3/4 tsp salt + 1 tsp baking soda + 1 qt water; rinse with pain water after using) in Eating Hints book (pg 18)  Brush your teeth before/after meals & before bed  · For lack of taste: Practice good oral care  Blend fresh fruits into shakes, ice cream or yogurt  Eat frozen fruits: grapes, chopped cantaloupe, watermelon  Select fresh vegetables (may be more appealing than canned/frozen)  Add extra flavor to foods: experiment with spices, salt & sweeteners, extra oil/butter, acidic foods; marinate meats (see pages 29-30 in your Eating Hints book)    · For appetite loss: try powdered or liquid nutrition supplements; eating by the clock every 2-3 hours; set a timer to remind yourself to eat, keep snacks nearby; add extra protein & calories to your diet; drink liquids in between meals, choose liquids that add calories; eat a bedtime snack; eat soft, cool or frozen foods; eat a larger meal when you feel well & rested; only sip small amounts of liquids during meals (see pages 10-12 in your Eating Hints book)  · For constipation: drink plenty of fluids (>64 oz/day); drink hot liquids; eat high-fiber foods as tolerated (whole grains, beans, peas, nuts, seeds, fruits, vegetables, etc); increase physical activity as tolerated  Avoid increasing fiber intake too quickly, add fiber into your diet slowly; keep a record of your bowel movements (see pages 13-14 in you Eating Hints book)  · For dry mouth: sip water throughout the day; try very sweet (or tart, as tolerated) drinks; chew gum or suck on hard candy, popsicles, & ice chips; eat easy to swallow foods (such as pureed cooked foods or soups); moisten food with sauce/gravy/salad dressing; do not drink beer, wine, or any type of alcohol; keep lips moist with lip balm; avoid tobacco products & second-hand smoke; try Biotene as needed (see pages 17-18 in your Eating Hints book)  Follow proper oral care; Try baking soda/salt water rinse recipe (mix 3/4 tsp salt + 1 tsp baking soda + 1 qt water; rinse with pain water after using) in Eating Hints book (pg 18)  Brush your teeth before/after meals & before bed  · Weigh yourself regularly  If you notice weight loss, make an effort to increase your daily food/calorie intake  If you continue to notice loss after these efforts, reach out to your dietitian to establish a plan to stabilize weight  · High protein foods to try: [place] yogurt, cheese stick, pudding, peanut butter, nuts, rolled up chicken/turkey/ham, hummus (see pages 49-51 in your Eating Hints book)   · Add protein to snacks as discussed above  · Try Apple Prune sauce recipe on page 14 of your Eating Hints Book; always drink 8 oz of water with your dose and take it before bed each night    · Make sure to eat slowly and chew thoroughly    · Lifetime supplements recommended for Gastric Bypass:  · Multivitamin BID  · Iron daily  · Vitamin D daily  · Calcium Citrate 500 mg TID (do not take with iron, needs to be 2 hrs apart)  · B12 1000 mcg daily    Follow Up Plan: 9/30/19 during infusion  Recommend Referral to Other Providers: none at this time

## 2019-08-19 NOTE — PROGRESS NOTES
Patient tolerated her chemo without any adverse reactions   Next appointment confirmed and avs given to her and family member per patients request

## 2019-08-19 NOTE — PROGRESS NOTES
Patient is here for chemo  Labs meet parameters for chemo  Patient states she has had some nausea at home after last chemo but did take the zofran and stated it did help  She also stated she has lost over 30 pounds in the last 1 5 months  She stated she was having trouble eating even before she started treatment  Patient asked if she didn't have and appetite or felt fool fast  She stated both  Brenda Jacinto the nutritionist is here and talking to patient

## 2019-08-21 ENCOUNTER — TELEPHONE (OUTPATIENT)
Dept: UROLOGY | Facility: MEDICAL CENTER | Age: 64
End: 2019-08-21

## 2019-08-21 NOTE — TELEPHONE ENCOUNTER
Patient of Dr Ena Michelle seen in [place] office  VNA with patient now  Patient's right nephrostomy tube is red and irritated and VNA would like to know if she could put something on it

## 2019-08-21 NOTE — TELEPHONE ENCOUNTER
Patient should be scheduled for follow-up appointment for evaluation  Has already been treated with a of antibiotics while she was away on vacation

## 2019-08-21 NOTE — TELEPHONE ENCOUNTER
Called Tonya back and scheduled her for tomorrow with Romero Ham in the [facility] [place] for 1:15

## 2019-08-22 ENCOUNTER — OFFICE VISIT (OUTPATIENT)
Dept: UROLOGY | Facility: AMBULATORY SURGERY CENTER | Age: 64
End: 2019-08-22
Payer: COMMERCIAL

## 2019-08-22 VITALS — HEART RATE: 88 BPM | SYSTOLIC BLOOD PRESSURE: 98 MMHG | DIASTOLIC BLOOD PRESSURE: 58 MMHG

## 2019-08-22 DIAGNOSIS — N13.39 OTHER HYDRONEPHROSIS: Primary | ICD-10-CM

## 2019-08-22 PROCEDURE — 99213 OFFICE O/P EST LOW 20 MIN: CPT | Performed by: NURSE PRACTITIONER

## 2019-08-22 NOTE — PROGRESS NOTES
8/22/2019    Chief Complaint   Patient presents with    Other Hydronephrosis     Nephrostomy site check     Assessment and Plan    66 y o  female managed by Dr Sylvester Dean    1  Hydronephrosis  · Status post insertion of right percutaneous nephrostomy tube 07/18/2019  · Nephrostomy tube insertion site unremarkable  · Continue to have home nursing evaluate tube in irrigate daily  · Advised patient to call the office for uncontrolled fever, chills, pain or increased redness at the insertion site  History of Present Illness  Fawad Santos is a 66 y o  female here for follow up evaluation of  hydronephrosis secondary to recurrence of endometrial carcinoma with large pelvic mass on the right causing obstruction of the right ureter  She is currently in the process of receiving chemotherapy  She presents to the office today with complaints of redness and irritation at the insertion site of the percutaneous nephrostomy tube  Patient reports since her 2nd dose of chemotherapy, she has significantly dizzy, weak and lightheaded  Review of Systems   Constitutional: Negative for chills and fever  Respiratory: Negative for cough and shortness of breath  Cardiovascular: Negative for chest pain  Gastrointestinal: Negative for abdominal distention, abdominal pain, blood in stool, nausea and vomiting  Genitourinary: Negative for difficulty urinating, dysuria, enuresis, flank pain, frequency, hematuria and urgency  Musculoskeletal: Negative for back pain  Skin: Negative for rash  Neurological: Positive for dizziness, weakness and light-headedness         Past Medical History  Past Medical History:   Diagnosis Date    Anemia     Depression     Diabetes mellitus ([facility] )     Endometrial cancer ([facility] ) 12/2017    Hyperglycemia     vx type 2 dm -- last assessed 4/1/14; resolved 11/7/17    Hyperlipidemia     Hypertension     Obesity     last assessed 10/14/17; resolved 11/7/17       Past Social History  Past Surgical History:   Procedure Laterality Date    ABDOMINAL SURGERY      GASTRIC BYPASS    CHOLECYSTECTOMY      at the time of gastric bypass    COLONOSCOPY      CT NEEDLE BIOPSY LYMPH NODE  7/8/2019    GASTRIC BYPASS      HYSTERECTOMY Bilateral     total abdominal with salpingo-oophorectomy    IR PORT PLACEMENT  7/26/2019    IR TUBE PLACEMENT NEPHROSTOMY  7/26/2019    OOPHORECTOMY Bilateral     CT LAP, RADICAL HYST W/ TUBE&OV, NODE BX N/A 12/19/2017    Procedure: HYSTERECTOMY LAPAROSCOPIC TOTAL ([address]) W/ ROBOTICS; BILATERAL SALPINGOOPHERECTOMY; LYMPH NODE DISSECTION; lysis of adhesions;  Surgeon: George Blankenship MD;  [facility];  Service: Gynecology Oncology    CT LAP,DIAGNOSTIC ABDOMEN N/A 12/19/2017    Procedure: LAPAROSCOPY DIAGNOSTIC;  Surgeon: George Blankenship MD;  [facility];  Service: Gynecology Oncology    TONSILLECTOMY      US GUIDED BREAST BIOPSY RIGHT COMPLETE Right 6/28/2019     Social History     Tobacco Use   Smoking Status Never Smoker   Smokeless Tobacco Never Used       Past Family History  Family History   Problem Relation Age of Onset    Other Mother         dyslipidemia    Ovarian cancer Mother 48    Lymphoma Father     Bone cancer Maternal Grandfather     No Known Problems Sister     No Known Problems Maternal Grandmother     No Known Problems Paternal Grandmother     No Known Problems Paternal Grandfather     No Known Problems Maternal Aunt     No Known Problems Maternal Aunt     No Known Problems Maternal Aunt     No Known Problems Maternal Aunt     No Known Problems Paternal Aunt     No Known Problems Paternal Aunt     No Known Problems Paternal Aunt     No Known Problems Paternal Aunt     No Known Problems Brother     No Known Problems Brother        Past Social history  Social History     Socioeconomic History    Marital status: Single     Spouse name: Not on file    Number of children: Not on file    Years of education: Not on file    Highest education level: Not on file   Occupational History    Not on file   Social Needs    Financial resource strain: Not on file    Food insecurity:     Worry: Not on file     Inability: Not on file    Transportation needs:     Medical: Not on file     Non-medical: Not on file   Tobacco Use    Smoking status: Never Smoker    Smokeless tobacco: Never Used   Substance and Sexual Activity    Alcohol use: Not Currently    Drug use: No    Sexual activity: Not Currently   Lifestyle    Physical activity:     Days per week: Not on file     Minutes per session: Not on file    Stress: Not on file   Relationships    Social connections:     Talks on phone: Not on file     Gets together: Not on file     Attends Orthodoxy service: Not on file     Active member of club or organization: Not on file     Attends meetings of clubs or organizations: Not on file     Relationship status: Not on file    Intimate partner violence:     Fear of current or ex partner: Not on file     Emotionally abused: Not on file     Physically abused: Not on file     Forced sexual activity: Not on file   Other Topics Concern    Not on file   Social History Narrative    Not on file       Current Medications  Current Outpatient Medications   Medication Sig Dispense Refill    ARIPiprazole (ABILIFY) 10 mg tablet Take 10 mg by mouth daily      aspirin (ECOTRIN LOW STRENGTH) 81 mg EC tablet Take 81 mg by mouth daily      cholecalciferol (VITAMIN D3) 1,000 units tablet Take 1,000 Units by mouth daily      dexmethylphenidate (FOCALIN XR) 10 MG 24 hr capsule Take 10 mg by mouth daily      Ferrous Sulfate (IRON) 28 MG TABS Take by mouth      furosemide (LASIX) 40 mg tablet Take 1 tablet (40 mg total) by mouth 2 (two) times a day 180 tablet 1    gemfibrozil (LOPID) 600 mg tablet TAKE 1 TABLET BY MOUTH DAILY 90 tablet 1    LORazepam (ATIVAN) 1 mg tablet Take 1 tablet (1 mg total) by mouth every 6 (six) hours as needed for anxiety (or nausea) 36 tablet 1    metFORMIN (GLUCOPHAGE) 500 mg tablet Take 1 tablet (500 mg total) by mouth daily 180 tablet 1    MYRBETRIQ 50 MG TB24 TAKE 1 TABLET DAILY 30 tablet 10    omeprazole (PriLOSEC) 20 mg delayed release capsule Take 20 mg by mouth daily      ondansetron (ZOFRAN) 8 mg tablet Take 1 tablet (8 mg total) by mouth every 8 (eight) hours as needed for nausea or vomiting 30 tablet 1    oxybutynin (DITROPAN-XL) 10 MG 24 hr tablet TAKE 1 TABLET BY MOUTH EVERY DAY 30 tablet 11    potassium chloride (K-DUR,KLOR-CON) 20 mEq tablet Take 1 tablet (20 mEq total) by mouth 2 (two) times a day 180 tablet 1    quinapril (ACCUPRIL) 5 mg tablet TAKE 1 TABLET BY MOUTH DAILY AT BEDTIME 90 tablet 1    rivaroxaban (XARELTO) 20 mg tablet Take 1 tablet (20 mg total) by mouth daily with breakfast 30 tablet 4    sodium chloride, PF, 0 9 % 10 mL by Intracatheter route daily for 30 days 300 mL 3    venlafaxine (EFFEXOR) 75 mg tablet Take 75 mg by mouth 3 (three) times a day        lactulose 20 g/30 mL Take 15 mL (10 g total) by mouth 3 (three) times a day for 22 days 946 mL 0    senna (SENOKOT) 8 6 MG tablet Take 2 tablets (17 2 mg total) by mouth 2 (two) times a day for 14 days 56 tablet 0     No current facility-administered medications for this visit  Allergies  Allergies   Allergen Reactions    Cephalosporins Other (See Comments)     RASH - BILATERAL ARMS PER RN         The following portions of the patient's history were reviewed and updated as appropriate: allergies, current medications, past medical history, past social history, past surgical history and problem list       Vitals  Vitals:    08/22/19 1329   BP: 98/58   BP Location: Left arm   Patient Position: Sitting   Cuff Size: Large   Pulse: 88     Physical Exam  Physical Exam   Constitutional: She is oriented to person, place, and time  She appears well-nourished  HENT:   Head: Atraumatic     Eyes: EOM are normal    Pulmonary/Chest: Effort normal  No respiratory distress  Musculoskeletal: Normal range of motion  Neurological: She is alert and oriented to person, place, and time  Skin: Skin is warm and dry  Psychiatric: She has a normal mood and affect  Vitals reviewed  Results  No results found for this or any previous visit (from the past 1 hour(s))  ]  No results found for: PSA  Lab Results   Component Value Date    GLUCOSE 219 (H) 12/19/2017    CALCIUM 9 6 08/17/2019     10/27/2017    K 3 6 08/17/2019    CO2 27 08/17/2019    CL 99 (L) 08/17/2019    BUN 14 08/17/2019    CREATININE 0 93 08/17/2019     Lab Results   Component Value Date    WBC 10 09 08/17/2019    HGB 10 2 (L) 08/17/2019    HCT 32 0 (L) 08/17/2019    MCV 88 08/17/2019     08/17/2019     Orders  No orders of the defined types were placed in this encounter        RAFITA Wahl

## 2019-08-22 NOTE — PATIENT INSTRUCTIONS
Follow up as previously scheduled  Call for concerns regarding questionable infection with percutaneous nephrostomy tube  Please call the office for uncontrolled fever, chills, pain    Redness at the insertion site

## 2019-08-23 ENCOUNTER — HOSPITAL ENCOUNTER (OUTPATIENT)
Dept: INFUSION CENTER | Facility: CLINIC | Age: 64
Discharge: HOME | End: 2019-08-23
Payer: COMMERCIAL

## 2019-08-23 VITALS — HEART RATE: 108 BPM | RESPIRATION RATE: 20 BRPM | SYSTOLIC BLOOD PRESSURE: 114 MMHG | DIASTOLIC BLOOD PRESSURE: 74 MMHG

## 2019-08-23 DIAGNOSIS — C54.1 ENDOMETRIAL CANCER (HCC): ICD-10-CM

## 2019-08-23 DIAGNOSIS — Z45.2 ENCOUNTER FOR CENTRAL LINE CARE: Primary | ICD-10-CM

## 2019-08-23 LAB
ALBUMIN SERPL BCP-MCNC: 3.7 G/DL (ref 3.5–5)
ALP SERPL-CCNC: 102 U/L (ref 46–116)
ALT SERPL W P-5'-P-CCNC: 32 U/L (ref 12–78)
ANION GAP SERPL CALCULATED.3IONS-SCNC: 13 MMOL/L (ref 4–13)
AST SERPL W P-5'-P-CCNC: 30 U/L (ref 5–45)
BASOPHILS # BLD AUTO: 0.03 THOUSANDS/ΜL (ref 0–0.1)
BASOPHILS NFR BLD AUTO: 0 % (ref 0–1)
BILIRUB SERPL-MCNC: 0.6 MG/DL (ref 0.2–1)
BUN SERPL-MCNC: 25 MG/DL (ref 5–25)
CALCIUM SERPL-MCNC: 9.4 MG/DL (ref 8.3–10.1)
CHLORIDE SERPL-SCNC: 94 MMOL/L (ref 100–108)
CO2 SERPL-SCNC: 29 MMOL/L (ref 21–32)
CREAT SERPL-MCNC: 0.96 MG/DL (ref 0.6–1.3)
EOSINOPHIL # BLD AUTO: 0.46 THOUSAND/ΜL (ref 0–0.61)
EOSINOPHIL NFR BLD AUTO: 6 % (ref 0–6)
ERYTHROCYTE [DISTWIDTH] IN BLOOD BY AUTOMATED COUNT: 13.4 % (ref 11.6–15.1)
GFR SERPL CREATININE-BSD FRML MDRD: 64 ML/MIN/1.73SQ M
GLUCOSE SERPL-MCNC: 144 MG/DL (ref 65–140)
HCT VFR BLD AUTO: 33 % (ref 34.8–46.1)
HGB BLD-MCNC: 10.7 G/DL (ref 11.5–15.4)
IMM GRANULOCYTES # BLD AUTO: 0.02 THOUSAND/UL (ref 0–0.2)
IMM GRANULOCYTES NFR BLD AUTO: 0 % (ref 0–2)
LYMPHOCYTES # BLD AUTO: 1.49 THOUSANDS/ΜL (ref 0.6–4.47)
LYMPHOCYTES NFR BLD AUTO: 18 % (ref 14–44)
MAGNESIUM SERPL-MCNC: 1.7 MG/DL (ref 1.6–2.6)
MCH RBC QN AUTO: 27.8 PG (ref 26.8–34.3)
MCHC RBC AUTO-ENTMCNC: 32.4 G/DL (ref 31.4–37.4)
MCV RBC AUTO: 86 FL (ref 82–98)
MONOCYTES # BLD AUTO: 0.15 THOUSAND/ΜL (ref 0.17–1.22)
MONOCYTES NFR BLD AUTO: 2 % (ref 4–12)
NEUTROPHILS # BLD AUTO: 6.29 THOUSANDS/ΜL (ref 1.85–7.62)
NEUTS SEG NFR BLD AUTO: 74 % (ref 43–75)
NRBC BLD AUTO-RTO: 0 /100 WBCS
PLATELET # BLD AUTO: 218 THOUSANDS/UL (ref 149–390)
PMV BLD AUTO: 10 FL (ref 8.9–12.7)
POTASSIUM SERPL-SCNC: 3.4 MMOL/L (ref 3.5–5.3)
PROT SERPL-MCNC: 8.3 G/DL (ref 6.4–8.2)
RBC # BLD AUTO: 3.85 MILLION/UL (ref 3.81–5.12)
SODIUM SERPL-SCNC: 136 MMOL/L (ref 136–145)
WBC # BLD AUTO: 8.44 THOUSAND/UL (ref 4.31–10.16)

## 2019-08-23 PROCEDURE — 83735 ASSAY OF MAGNESIUM: CPT

## 2019-08-23 PROCEDURE — 80053 COMPREHEN METABOLIC PANEL: CPT

## 2019-08-23 PROCEDURE — 85025 COMPLETE CBC W/AUTO DIFF WBC: CPT

## 2019-08-23 NOTE — PROGRESS NOTES
Pt reports feeling weak and states that the chemo really "hit her" yesterday and today  Vitals taken and stable  Pt encouraged to increase fluids, as she might be a bit dehydrated  Pt also encouraged to call MD if she passes out or feels worse  Pt agreeable  Port accessed, labs drawn, port saline-locked and deaccessed without issue  Pt declined AVS  Next appt reviewed c/ pt

## 2019-08-28 ENCOUNTER — OFFICE VISIT (OUTPATIENT)
Dept: FAMILY MEDICINE CLINIC | Facility: CLINIC | Age: 64
End: 2019-08-28
Payer: COMMERCIAL

## 2019-08-28 ENCOUNTER — APPOINTMENT (OUTPATIENT)
Dept: RADIOLOGY | Facility: MEDICAL CENTER | Age: 64
End: 2019-08-28
Payer: COMMERCIAL

## 2019-08-28 VITALS
OXYGEN SATURATION: 99 % | TEMPERATURE: 99.1 F | HEART RATE: 98 BPM | HEIGHT: 59 IN | DIASTOLIC BLOOD PRESSURE: 62 MMHG | WEIGHT: 194 LBS | BODY MASS INDEX: 39.11 KG/M2 | SYSTOLIC BLOOD PRESSURE: 112 MMHG | RESPIRATION RATE: 16 BRPM

## 2019-08-28 DIAGNOSIS — K62.6: ICD-10-CM

## 2019-08-28 DIAGNOSIS — I95.1 ORTHOSTATIC HYPOTENSION: ICD-10-CM

## 2019-08-28 DIAGNOSIS — E86.0 DEHYDRATION: Primary | ICD-10-CM

## 2019-08-28 DIAGNOSIS — E87.6 HYPOKALEMIA: ICD-10-CM

## 2019-08-28 DIAGNOSIS — R73.9 HYPERGLYCEMIA: ICD-10-CM

## 2019-08-28 LAB — SL AMB POCT HEMOGLOBIN AIC: 6.2 (ref ?–6.5)

## 2019-08-28 PROCEDURE — 71046 X-RAY EXAM CHEST 2 VIEWS: CPT

## 2019-08-28 PROCEDURE — 99214 OFFICE O/P EST MOD 30 MIN: CPT | Performed by: INTERNAL MEDICINE

## 2019-08-28 PROCEDURE — 83036 HEMOGLOBIN GLYCOSYLATED A1C: CPT | Performed by: INTERNAL MEDICINE

## 2019-08-28 NOTE — PROGRESS NOTES
Assessment/Plan:         Diagnoses and all orders for this visit:    Dehydration  Comments:  d/c diuretic    Orthostatic hypotension  Comments:  d/c lasix  Orders:  -     XR chest pa & lateral; Future    Hypokalemia  Comments:  replace k    Ulceration of rectal mucous membrane  -     mupirocin (BACTROBAN) 2 % ointment; Apply topically 3 (three) times a day    Hyperglycemia  -     POCT hemoglobin A1c          Subjective:      Patient ID: Laurel Brown is a 66 y o  female  Pt lost 30 to 40 lbs in about 1 month  She is eating poorly  +reed with walking  Denies fever  Denies cp/chest tightness  Discussed will d/c lasix and stay on k daily x 3 days then d/c  Will d/c metformen and watch glucose weekly  +orthostasis  Discussed feel she is dehydrated  a1c 6 2        The following portions of the patient's history were reviewed and updated as appropriate: She  has a past medical history of Anemia, Depression, Diabetes mellitus ([facility] [address] ), Endometrial cancer ([facility] ) (12/2017), Hyperglycemia, Hyperlipidemia, Hypertension, and Obesity  She   Patient Active Problem List    Diagnosis Date Noted    Cancer related pain 07/19/2019    Therapeutic opioid induced constipation 07/19/2019    Other hydronephrosis 07/16/2019    Encounter for central line care 07/16/2019    Acute deep vein thrombosis (DVT) of proximal vein of lower extremity ([facility] [address] ) 06/19/2019    Breast cancer screening 06/19/2019    Benign essential hypertension 12/19/2017    Class 3 severe obesity with body mass index (BMI) of 45 0 to 49 9 in adult [facility]) 12/19/2017    Prediabetes 12/19/2017    Endometrial cancer ([facility] ) 11/29/2017    Dyslipidemia 04/01/2014    Major depression, chronic 10/07/2013     She  has a past surgical history that includes Abdominal surgery; Colonoscopy; pr lap, radical hyst w/ tube&ov, node bx (N/A, 12/19/2017); pr lap,diagnostic abdomen (N/A, 12/19/2017); Cholecystectomy; Gastric bypass; Tonsillectomy;  Hysterectomy (Bilateral); Oophorectomy (Bilateral); US guided breast biopsy right complete (Right, 6/28/2019); CT needle biopsy lymph node (7/8/2019); IR port Placement (7/26/2019); and IR tube placement nephrostomy (7/26/2019)  Her family history includes Bone cancer in her maternal grandfather; Lymphoma in her father; No Known Problems in her brother, brother, maternal aunt, maternal aunt, maternal aunt, maternal aunt, maternal grandmother, paternal aunt, paternal aunt, paternal aunt, paternal aunt, paternal grandfather, paternal grandmother, and sister; Other in her mother; Ovarian cancer (age of onset: 48) in her mother  She  reports that she has never smoked  She has never used smokeless tobacco  She reports that she drank alcohol  She reports that she does not use drugs    Current Outpatient Medications   Medication Sig Dispense Refill    ARIPiprazole (ABILIFY) 10 mg tablet Take 10 mg by mouth daily      aspirin (ECOTRIN LOW STRENGTH) 81 mg EC tablet Take 81 mg by mouth daily      cholecalciferol (VITAMIN D3) 1,000 units tablet Take 1,000 Units by mouth daily      dexmethylphenidate (FOCALIN XR) 10 MG 24 hr capsule Take 10 mg by mouth daily      Ferrous Sulfate (IRON) 28 MG TABS Take by mouth      gemfibrozil (LOPID) 600 mg tablet TAKE 1 TABLET BY MOUTH DAILY 90 tablet 1    LORazepam (ATIVAN) 1 mg tablet Take 1 tablet (1 mg total) by mouth every 6 (six) hours as needed for anxiety (or nausea) 36 tablet 1    MYRBETRIQ 50 MG TB24 TAKE 1 TABLET DAILY 30 tablet 10    omeprazole (PriLOSEC) 20 mg delayed release capsule Take 20 mg by mouth daily      ondansetron (ZOFRAN) 8 mg tablet Take 1 tablet (8 mg total) by mouth every 8 (eight) hours as needed for nausea or vomiting 30 tablet 1    oxybutynin (DITROPAN-XL) 10 MG 24 hr tablet TAKE 1 TABLET BY MOUTH EVERY DAY 30 tablet 11    potassium chloride (K-DUR,KLOR-CON) 20 mEq tablet Take 1 tablet (20 mEq total) by mouth 2 (two) times a day 180 tablet 1    quinapril (ACCUPRIL) 5 mg tablet TAKE 1 TABLET BY MOUTH DAILY AT BEDTIME 90 tablet 1    rivaroxaban (XARELTO) 20 mg tablet Take 1 tablet (20 mg total) by mouth daily with breakfast 30 tablet 4    venlafaxine (EFFEXOR) 75 mg tablet Take 75 mg by mouth 3 (three) times a day        lactulose 20 g/30 mL Take 15 mL (10 g total) by mouth 3 (three) times a day for 22 days 946 mL 0    mupirocin (BACTROBAN) 2 % ointment Apply topically 3 (three) times a day 22 g 0    senna (SENOKOT) 8 6 MG tablet Take 2 tablets (17 2 mg total) by mouth 2 (two) times a day for 14 days 56 tablet 0    sodium chloride, PF, 0 9 % 10 mL by Intracatheter route daily for 30 days 300 mL 3     No current facility-administered medications for this visit        Current Outpatient Medications on File Prior to Visit   Medication Sig    ARIPiprazole (ABILIFY) 10 mg tablet Take 10 mg by mouth daily    aspirin (ECOTRIN LOW STRENGTH) 81 mg EC tablet Take 81 mg by mouth daily    cholecalciferol (VITAMIN D3) 1,000 units tablet Take 1,000 Units by mouth daily    dexmethylphenidate (FOCALIN XR) 10 MG 24 hr capsule Take 10 mg by mouth daily    Ferrous Sulfate (IRON) 28 MG TABS Take by mouth    gemfibrozil (LOPID) 600 mg tablet TAKE 1 TABLET BY MOUTH DAILY    LORazepam (ATIVAN) 1 mg tablet Take 1 tablet (1 mg total) by mouth every 6 (six) hours as needed for anxiety (or nausea)    MYRBETRIQ 50 MG TB24 TAKE 1 TABLET DAILY    omeprazole (PriLOSEC) 20 mg delayed release capsule Take 20 mg by mouth daily    ondansetron (ZOFRAN) 8 mg tablet Take 1 tablet (8 mg total) by mouth every 8 (eight) hours as needed for nausea or vomiting    oxybutynin (DITROPAN-XL) 10 MG 24 hr tablet TAKE 1 TABLET BY MOUTH EVERY DAY    potassium chloride (K-DUR,KLOR-CON) 20 mEq tablet Take 1 tablet (20 mEq total) by mouth 2 (two) times a day    quinapril (ACCUPRIL) 5 mg tablet TAKE 1 TABLET BY MOUTH DAILY AT BEDTIME    rivaroxaban (XARELTO) 20 mg tablet Take 1 tablet (20 mg total) by mouth daily with breakfast    venlafaxine (EFFEXOR) 75 mg tablet Take 75 mg by mouth 3 (three) times a day      lactulose 20 g/30 mL Take 15 mL (10 g total) by mouth 3 (three) times a day for 22 days    senna (SENOKOT) 8 6 MG tablet Take 2 tablets (17 2 mg total) by mouth 2 (two) times a day for 14 days    sodium chloride, PF, 0 9 % 10 mL by Intracatheter route daily for 30 days     No current facility-administered medications on file prior to visit  She is allergic to cephalosporins       Review of Systems   Constitutional: Positive for fatigue  Respiratory: Positive for shortness of breath  Negative for wheezing  Cardiovascular: Negative  Objective:      /62 (BP Location: Right arm, Patient Position: Sitting, Cuff Size: Large)   Pulse 98   Temp 99 1 °F (37 3 °C) (Tympanic)   Resp 16   Ht 4' 11" (1 499 m)   Wt 88 kg (194 lb)   SpO2 99%   BMI 39 18 kg/m²          Physical Exam   Constitutional: She appears well-developed and well-nourished  No distress  HENT:   Head: Normocephalic and atraumatic  Right Ear: External ear normal    Left Ear: External ear normal    Nose: Nose normal    Mouth/Throat: Oropharynx is clear and moist  No oropharyngeal exudate  Neck: Normal range of motion  Neck supple  No tracheal deviation present  No thyromegaly present  Cardiovascular: Normal rate, regular rhythm, normal heart sounds and intact distal pulses  Exam reveals no gallop and no friction rub  No murmur heard  Pulmonary/Chest: Effort normal and breath sounds normal  No respiratory distress  She has no wheezes  She has no rales  She exhibits no tenderness  Lymphadenopathy:     She has no cervical adenopathy  Skin: She is not diaphoretic

## 2019-08-30 ENCOUNTER — TELEPHONE (OUTPATIENT)
Dept: GYNECOLOGIC ONCOLOGY | Facility: CLINIC | Age: 64
End: 2019-08-30

## 2019-08-30 ENCOUNTER — HOSPITAL ENCOUNTER (OUTPATIENT)
Dept: INFUSION CENTER | Facility: CLINIC | Age: 64
Discharge: HOME | End: 2019-08-30
Payer: COMMERCIAL

## 2019-08-30 DIAGNOSIS — D70.1 CHEMOTHERAPY INDUCED NEUTROPENIA (HCC): Primary | ICD-10-CM

## 2019-08-30 DIAGNOSIS — C54.1 ENDOMETRIAL CANCER (HCC): Primary | ICD-10-CM

## 2019-08-30 DIAGNOSIS — T45.1X5A CHEMOTHERAPY INDUCED NEUTROPENIA (HCC): Primary | ICD-10-CM

## 2019-08-30 DIAGNOSIS — Z45.2 ENCOUNTER FOR CENTRAL LINE CARE: ICD-10-CM

## 2019-08-30 LAB
ALBUMIN SERPL BCP-MCNC: 3.5 G/DL (ref 3.5–5)
ALP SERPL-CCNC: 106 U/L (ref 46–116)
ALT SERPL W P-5'-P-CCNC: 31 U/L (ref 12–78)
ANION GAP SERPL CALCULATED.3IONS-SCNC: 12 MMOL/L (ref 4–13)
AST SERPL W P-5'-P-CCNC: 17 U/L (ref 5–45)
BASOPHILS # BLD MANUAL: 0 THOUSAND/UL (ref 0–0.1)
BASOPHILS NFR MAR MANUAL: 0 % (ref 0–1)
BILIRUB SERPL-MCNC: 0.3 MG/DL (ref 0.2–1)
BUN SERPL-MCNC: 16 MG/DL (ref 5–25)
CALCIUM SERPL-MCNC: 9.4 MG/DL (ref 8.3–10.1)
CHLORIDE SERPL-SCNC: 94 MMOL/L (ref 100–108)
CO2 SERPL-SCNC: 28 MMOL/L (ref 21–32)
CREAT SERPL-MCNC: 0.82 MG/DL (ref 0.6–1.3)
EOSINOPHIL # BLD MANUAL: 0.02 THOUSAND/UL (ref 0–0.4)
EOSINOPHIL NFR BLD MANUAL: 1 % (ref 0–6)
ERYTHROCYTE [DISTWIDTH] IN BLOOD BY AUTOMATED COUNT: 13.5 % (ref 11.6–15.1)
GFR SERPL CREATININE-BSD FRML MDRD: 77 ML/MIN/1.73SQ M
GLUCOSE SERPL-MCNC: 112 MG/DL (ref 65–140)
HCT VFR BLD AUTO: 28 % (ref 34.8–46.1)
HGB BLD-MCNC: 9.1 G/DL (ref 11.5–15.4)
LYMPHOCYTES # BLD AUTO: 0.99 THOUSAND/UL (ref 0.6–4.47)
LYMPHOCYTES # BLD AUTO: 54 % (ref 14–44)
MAGNESIUM SERPL-MCNC: 1.7 MG/DL (ref 1.6–2.6)
MCH RBC QN AUTO: 27.9 PG (ref 26.8–34.3)
MCHC RBC AUTO-ENTMCNC: 32.5 G/DL (ref 31.4–37.4)
MCV RBC AUTO: 86 FL (ref 82–98)
MICROCYTES BLD QL AUTO: PRESENT
MONOCYTES # BLD AUTO: 0.6 THOUSAND/UL (ref 0–1.22)
MONOCYTES NFR BLD: 33 % (ref 4–12)
NEUTROPHILS # BLD MANUAL: 0.11 THOUSAND/UL (ref 1.85–7.62)
NEUTS BAND NFR BLD MANUAL: 1 % (ref 0–8)
NEUTS SEG NFR BLD AUTO: 5 % (ref 43–75)
NRBC BLD AUTO-RTO: 0 /100 WBCS
PLATELET # BLD AUTO: 193 THOUSANDS/UL (ref 149–390)
PLATELET BLD QL SMEAR: ADEQUATE
PMV BLD AUTO: 9.9 FL (ref 8.9–12.7)
POTASSIUM SERPL-SCNC: 3 MMOL/L (ref 3.5–5.3)
PROT SERPL-MCNC: 7.7 G/DL (ref 6.4–8.2)
RBC # BLD AUTO: 3.26 MILLION/UL (ref 3.81–5.12)
SODIUM SERPL-SCNC: 134 MMOL/L (ref 136–145)
TOTAL CELLS COUNTED SPEC: 100
VARIANT LYMPHS # BLD AUTO: 6 %
WBC # BLD AUTO: 1.83 THOUSAND/UL (ref 4.31–10.16)

## 2019-08-30 PROCEDURE — 85007 BL SMEAR W/DIFF WBC COUNT: CPT

## 2019-08-30 PROCEDURE — 83735 ASSAY OF MAGNESIUM: CPT

## 2019-08-30 PROCEDURE — 85027 COMPLETE CBC AUTOMATED: CPT

## 2019-08-30 PROCEDURE — 80053 COMPREHEN METABOLIC PANEL: CPT

## 2019-08-30 NOTE — TELEPHONE ENCOUNTER
ANC 0  11  Patient is unable to go back to the infusion center today for Granix injection  She is amendable to going tomorrow; scheduled for 11:30AM  She will return to the infusion center on 9/3/19 for repeat labs and another Granix injection if indicated based on results  Plan for Neulasta with future treatments

## 2019-08-31 ENCOUNTER — HOSPITAL ENCOUNTER (OUTPATIENT)
Dept: INFUSION CENTER | Facility: CLINIC | Age: 64
Discharge: HOME | End: 2019-08-31
Payer: COMMERCIAL

## 2019-08-31 VITALS — TEMPERATURE: 98.1 F

## 2019-08-31 DIAGNOSIS — T45.1X5A CHEMOTHERAPY INDUCED NEUTROPENIA (HCC): Primary | ICD-10-CM

## 2019-08-31 DIAGNOSIS — D70.1 CHEMOTHERAPY INDUCED NEUTROPENIA (HCC): Primary | ICD-10-CM

## 2019-08-31 PROCEDURE — 96372 THER/PROPH/DIAG INJ SC/IM: CPT

## 2019-08-31 RX ADMIN — TBO-FILGRASTIM 300 MCG: 300 INJECTION, SOLUTION SUBCUTANEOUS at 11:37

## 2019-08-31 NOTE — PROGRESS NOTES
Alma Delia here for granix injection  She tolerated well  Denies any complaints other then being tired at this time

## 2019-08-31 NOTE — PROGRESS NOTES
Verified apt date and time with patient  Verbalized understanding of her apt on Tuesday at 0830  Patient refused AVS at discharge

## 2019-09-03 ENCOUNTER — HOSPITAL ENCOUNTER (OUTPATIENT)
Dept: INFUSION CENTER | Facility: CLINIC | Age: 64
Discharge: HOME | End: 2019-09-03
Payer: COMMERCIAL

## 2019-09-03 DIAGNOSIS — C54.1 ENDOMETRIAL CANCER (HCC): ICD-10-CM

## 2019-09-03 DIAGNOSIS — D70.1 CHEMOTHERAPY INDUCED NEUTROPENIA (HCC): Primary | ICD-10-CM

## 2019-09-03 DIAGNOSIS — Z45.2 ENCOUNTER FOR CENTRAL LINE CARE: ICD-10-CM

## 2019-09-03 DIAGNOSIS — T45.1X5A CHEMOTHERAPY INDUCED NEUTROPENIA (HCC): Primary | ICD-10-CM

## 2019-09-03 LAB
BASOPHILS # BLD MANUAL: 0 THOUSAND/UL (ref 0–0.1)
BASOPHILS NFR MAR MANUAL: 0 % (ref 0–1)
DOHLE BOD BLD QL SMEAR: PRESENT
EOSINOPHIL # BLD MANUAL: 0 THOUSAND/UL (ref 0–0.4)
EOSINOPHIL NFR BLD MANUAL: 0 % (ref 0–6)
ERYTHROCYTE [DISTWIDTH] IN BLOOD BY AUTOMATED COUNT: 15.4 % (ref 11.6–15.1)
HCT VFR BLD AUTO: 27.7 % (ref 34.8–46.1)
HGB BLD-MCNC: 8.6 G/DL (ref 11.5–15.4)
LYMPHOCYTES # BLD AUTO: 2.2 THOUSAND/UL (ref 0.6–4.47)
LYMPHOCYTES # BLD AUTO: 32 % (ref 14–44)
MCH RBC QN AUTO: 28 PG (ref 26.8–34.3)
MCHC RBC AUTO-ENTMCNC: 31 G/DL (ref 31.4–37.4)
MCV RBC AUTO: 90 FL (ref 82–98)
MONOCYTES # BLD AUTO: 0.41 THOUSAND/UL (ref 0–1.22)
MONOCYTES NFR BLD: 6 % (ref 4–12)
MYELOCYTES NFR BLD MANUAL: 1 % (ref 0–1)
NEUTROPHILS # BLD MANUAL: 4.06 THOUSAND/UL (ref 1.85–7.62)
NEUTS SEG NFR BLD AUTO: 59 % (ref 43–75)
NRBC BLD AUTO-RTO: 0 /100 WBCS
PLATELET # BLD AUTO: 286 THOUSANDS/UL (ref 149–390)
PLATELET BLD QL SMEAR: ADEQUATE
PMV BLD AUTO: 8.7 FL (ref 8.9–12.7)
POLYCHROMASIA BLD QL SMEAR: PRESENT
RBC # BLD AUTO: 3.07 MILLION/UL (ref 3.81–5.12)
TOTAL CELLS COUNTED SPEC: 100
TOXIC GRANULES BLD QL SMEAR: PRESENT
VARIANT LYMPHS # BLD AUTO: 2 %
WBC # BLD AUTO: 6.88 THOUSAND/UL (ref 4.31–10.16)

## 2019-09-03 PROCEDURE — 85027 COMPLETE CBC AUTOMATED: CPT

## 2019-09-03 PROCEDURE — 85007 BL SMEAR W/DIFF WBC COUNT: CPT

## 2019-09-03 NOTE — PROGRESS NOTES
Patient to [facility] for Lab testing / Port maintenance: Offers no complaints at present time: Right PAC accessed without difficulty: Good blood return noted: Labs drawn per MD order

## 2019-09-03 NOTE — PROGRESS NOTES
Lab work ( 09/03/19 ) reviewed: ANC - 4 06: Dr Juliette Elizondo office notified: Spoke to Dahiana Gale[address]sim[address]: Granix not needed today: Repeat lab work to be drawn on Friday ( 09/06/19 ): Verified follow up appt with patient: Declined AVS

## 2019-09-03 NOTE — PROGRESS NOTES
Tolerated procedure without incident: No adverse reactions noted:  For possible Granix today: Will remain in waiting room until labs resulted

## 2019-09-04 ENCOUNTER — OFFICE VISIT (OUTPATIENT)
Dept: FAMILY MEDICINE CLINIC | Facility: CLINIC | Age: 64
End: 2019-09-04
Payer: COMMERCIAL

## 2019-09-04 VITALS
OXYGEN SATURATION: 98 % | DIASTOLIC BLOOD PRESSURE: 56 MMHG | BODY MASS INDEX: 41.53 KG/M2 | RESPIRATION RATE: 16 BRPM | SYSTOLIC BLOOD PRESSURE: 92 MMHG | HEIGHT: 59 IN | WEIGHT: 206 LBS | HEART RATE: 81 BPM | TEMPERATURE: 98.6 F

## 2019-09-04 DIAGNOSIS — I10 BENIGN ESSENTIAL HYPERTENSION: ICD-10-CM

## 2019-09-04 DIAGNOSIS — E87.6 HYPOKALEMIA: Primary | ICD-10-CM

## 2019-09-04 DIAGNOSIS — E78.5 DYSLIPIDEMIA: ICD-10-CM

## 2019-09-04 DIAGNOSIS — E86.0 DEHYDRATION: ICD-10-CM

## 2019-09-04 PROCEDURE — 3078F DIAST BP <80 MM HG: CPT | Performed by: INTERNAL MEDICINE

## 2019-09-04 PROCEDURE — 3074F SYST BP LT 130 MM HG: CPT | Performed by: INTERNAL MEDICINE

## 2019-09-04 PROCEDURE — 99214 OFFICE O/P EST MOD 30 MIN: CPT | Performed by: INTERNAL MEDICINE

## 2019-09-04 NOTE — PROGRESS NOTES
Assessment/Plan:         Diagnoses and all orders for this visit:    Hypokalemia  Comments:  repeat lab  Orders:  -     Basic metabolic panel; Future    Dehydration  Comments:  resolved    Benign essential hypertension  Comments:  stable  discussed will stop accupril if sbp under 100    Dyslipidemia  Comments:  on lopid          Subjective:      Patient ID: Meche Barros is a 60 y o  female  Pt states she is feeling better  She gained some weight and is eating better      The following portions of the patient's history were reviewed and updated as appropriate: She  has a past medical history of Anemia, Depression, Diabetes mellitus ([facility] ), [facility] ([facility] ) (12/2017), Hyperglycemia, Hyperlipidemia, Hypertension, and Obesity  She   Patient Active Problem List    Diagnosis Date Noted    Anemia due to chemotherapy 09/06/2019    Chemotherapy induced neutropenia ([facility] ) 08/30/2019    Cancer related pain 07/19/2019    Therapeutic opioid induced constipation 07/19/2019    Other hydronephrosis 07/16/2019    Encounter for central line care 07/16/2019    Acute deep vein thrombosis (DVT) of proximal vein of lower extremity ([facility] ) 06/19/2019    Breast cancer screening 06/19/2019    Benign essential hypertension 12/19/2017    Class 3 severe obesity with body mass index (BMI) of 45 0 to 49 9 in adult [facility]) 12/19/2017    Prediabetes 12/19/2017    Endometrial cancer ([facility] ) 11/29/2017    Dyslipidemia 04/01/2014    Major depression, chronic 10/07/2013     She  has a past surgical history that includes Abdominal surgery; Colonoscopy; pr lap, radical hyst w/ tube&ov, node bx (N/A, 12/19/2017); pr lap,diagnostic abdomen (N/A, 12/19/2017); Cholecystectomy; Gastric bypass; Tonsillectomy; Hysterectomy (Bilateral); Oophorectomy (Bilateral); US guided breast biopsy right complete (Right, 6/28/2019); CT needle biopsy lymph node (7/8/2019);  IR port Placement (7/26/2019); and IR tube placement nephrostomy (7/26/2019)  Her family history includes Bone cancer in her maternal grandfather; Lymphoma in her father; No Known Problems in her brother, brother, maternal aunt, maternal aunt, maternal aunt, maternal aunt, maternal grandmother, paternal aunt, paternal aunt, paternal aunt, paternal aunt, paternal grandfather, paternal grandmother, and sister; Other in her mother; Ovarian cancer (age of onset: 48) in her mother  She  reports that she has never smoked  She has never used smokeless tobacco  She reports that she drank alcohol  She reports that she does not use drugs    Current Outpatient Medications   Medication Sig Dispense Refill    ARIPiprazole (ABILIFY) 10 mg tablet Take 10 mg by mouth daily      aspirin (ECOTRIN LOW STRENGTH) 81 mg EC tablet Take 81 mg by mouth daily      cholecalciferol (VITAMIN D3) 1,000 units tablet Take 1,000 Units by mouth daily      dexmethylphenidate (FOCALIN XR) 10 MG 24 hr capsule Take 10 mg by mouth daily      Ferrous Sulfate (IRON) 28 MG TABS Take by mouth      gemfibrozil (LOPID) 600 mg tablet TAKE 1 TABLET BY MOUTH DAILY 90 tablet 1    lactulose 20 g/30 mL Take 15 mL (10 g total) by mouth 3 (three) times a day for 22 days 946 mL 0    LORazepam (ATIVAN) 1 mg tablet Take 1 tablet (1 mg total) by mouth every 6 (six) hours as needed for anxiety (or nausea) 36 tablet 1    mupirocin (BACTROBAN) 2 % ointment Apply topically 3 (three) times a day 22 g 0    MYRBETRIQ 50 MG TB24 TAKE 1 TABLET DAILY 30 tablet 10    omeprazole (PriLOSEC) 20 mg delayed release capsule Take 20 mg by mouth daily      ondansetron (ZOFRAN) 8 mg tablet Take 1 tablet (8 mg total) by mouth every 8 (eight) hours as needed for nausea or vomiting 30 tablet 1    oxybutynin (DITROPAN-XL) 10 MG 24 hr tablet TAKE 1 TABLET BY MOUTH EVERY DAY 30 tablet 11    quinapril (ACCUPRIL) 5 mg tablet TAKE 1 TABLET BY MOUTH DAILY AT BEDTIME 90 tablet 1    rivaroxaban (XARELTO) 20 mg tablet Take 1 tablet (20 mg total) by mouth daily with breakfast 30 tablet 4    sodium chloride, PF, 0 9 % 10 mL by Intracatheter route daily for 30 days 300 mL 3    venlafaxine (EFFEXOR) 75 mg tablet Take 75 mg by mouth 3 (three) times a day        ciprofloxacin (CIPRO) 500 mg tablet Take 1 tablet (500 mg total) by mouth 2 (two) times a day for 5 days 10 tablet 0     No current facility-administered medications for this visit        Facility-Administered Medications Ordered in Other Visits   Medication Dose Route Frequency Provider Last Rate Last Dose    CARBOplatin (PARAPLATIN) 678 6 mg in sodium chloride 0 9 % 250 mL IVPB  678 6 mg Intravenous Once Vivienne Velazquez MD        PACLitaxel (TAXOL) 327 mg in sodium chloride 0 9 % 500 mL chemo IVPB  175 mg/m2 (Treatment Plan Recorded) Intravenous Once Joby Carey  8 mL/hr at 09/09/19 0958 327 mg at 09/09/19 0958    pegfilgrastim (NEULASTA ONPRO) subcutaneous injection kit 6 mg  6 mg Subcutaneous Once Vivienne Velazquez MD         Current Outpatient Medications on File Prior to Visit   Medication Sig    ARIPiprazole (ABILIFY) 10 mg tablet Take 10 mg by mouth daily    aspirin (ECOTRIN LOW STRENGTH) 81 mg EC tablet Take 81 mg by mouth daily    cholecalciferol (VITAMIN D3) 1,000 units tablet Take 1,000 Units by mouth daily    dexmethylphenidate (FOCALIN XR) 10 MG 24 hr capsule Take 10 mg by mouth daily    Ferrous Sulfate (IRON) 28 MG TABS Take by mouth    gemfibrozil (LOPID) 600 mg tablet TAKE 1 TABLET BY MOUTH DAILY    lactulose 20 g/30 mL Take 15 mL (10 g total) by mouth 3 (three) times a day for 22 days    LORazepam (ATIVAN) 1 mg tablet Take 1 tablet (1 mg total) by mouth every 6 (six) hours as needed for anxiety (or nausea)    mupirocin (BACTROBAN) 2 % ointment Apply topically 3 (three) times a day    MYRBETRIQ 50 MG TB24 TAKE 1 TABLET DAILY    omeprazole (PriLOSEC) 20 mg delayed release capsule Take 20 mg by mouth daily    ondansetron (ZOFRAN) 8 mg tablet Take 1 tablet (8 mg total) by mouth every 8 (eight) hours as needed for nausea or vomiting    oxybutynin (DITROPAN-XL) 10 MG 24 hr tablet TAKE 1 TABLET BY MOUTH EVERY DAY    quinapril (ACCUPRIL) 5 mg tablet TAKE 1 TABLET BY MOUTH DAILY AT BEDTIME    rivaroxaban (XARELTO) 20 mg tablet Take 1 tablet (20 mg total) by mouth daily with breakfast    sodium chloride, PF, 0 9 % 10 mL by Intracatheter route daily for 30 days    venlafaxine (EFFEXOR) 75 mg tablet Take 75 mg by mouth 3 (three) times a day       No current facility-administered medications on file prior to visit  She is allergic to cephalosporins       Review of Systems   Constitutional: Negative  HENT: Negative  Respiratory: Negative  Cardiovascular: Negative  Gastrointestinal: Negative  Objective:      BP 92/56 (BP Location: Left arm, Patient Position: Sitting, Cuff Size: Standard)   Pulse 81   Temp 98 6 °F (37 °C) (Tympanic)   Resp 16   Ht 4' 11" (1 499 m)   Wt 93 4 kg (206 lb)   LMP  (LMP Unknown)   SpO2 98%   BMI 41 61 kg/m²          Physical Exam   Constitutional: She appears well-developed and well-nourished  No distress  HENT:   Head: Normocephalic and atraumatic  Right Ear: External ear normal    Left Ear: External ear normal    Nose: Nose normal    Mouth/Throat: Oropharynx is clear and moist  No oropharyngeal exudate  Neck: Normal range of motion  Neck supple  No JVD present  No tracheal deviation present  No thyromegaly present  Cardiovascular: Normal rate, regular rhythm, normal heart sounds and intact distal pulses  Exam reveals no gallop and no friction rub  No murmur heard  Pulmonary/Chest: Effort normal and breath sounds normal  No stridor  No respiratory distress  She has no wheezes  She has no rales  She exhibits no tenderness  Abdominal: Soft  Bowel sounds are normal  She exhibits no distension and no mass  There is no tenderness  There is no guarding  Lymphadenopathy:     She has no cervical adenopathy  Skin: She is not diaphoretic

## 2019-09-06 ENCOUNTER — OFFICE VISIT (OUTPATIENT)
Dept: GYNECOLOGIC ONCOLOGY | Facility: CLINIC | Age: 64
End: 2019-09-06
Payer: COMMERCIAL

## 2019-09-06 ENCOUNTER — TELEPHONE (OUTPATIENT)
Dept: GYNECOLOGIC ONCOLOGY | Facility: CLINIC | Age: 64
End: 2019-09-06

## 2019-09-06 ENCOUNTER — HOSPITAL ENCOUNTER (OUTPATIENT)
Dept: INFUSION CENTER | Facility: CLINIC | Age: 64
Discharge: HOME | End: 2019-09-06
Payer: COMMERCIAL

## 2019-09-06 VITALS
DIASTOLIC BLOOD PRESSURE: 88 MMHG | HEIGHT: 59 IN | SYSTOLIC BLOOD PRESSURE: 132 MMHG | WEIGHT: 207 LBS | TEMPERATURE: 97.9 F | BODY MASS INDEX: 41.73 KG/M2 | HEART RATE: 78 BPM

## 2019-09-06 DIAGNOSIS — T45.1X5A ANEMIA DUE TO CHEMOTHERAPY: ICD-10-CM

## 2019-09-06 DIAGNOSIS — D70.1 CHEMOTHERAPY INDUCED NEUTROPENIA (HCC): ICD-10-CM

## 2019-09-06 DIAGNOSIS — D64.81 ANEMIA DUE TO CHEMOTHERAPY: ICD-10-CM

## 2019-09-06 DIAGNOSIS — Z45.2 ENCOUNTER FOR CENTRAL LINE CARE: ICD-10-CM

## 2019-09-06 DIAGNOSIS — C54.1 ENDOMETRIAL CANCER (HCC): Primary | ICD-10-CM

## 2019-09-06 DIAGNOSIS — T45.1X5A CHEMOTHERAPY INDUCED NEUTROPENIA (HCC): ICD-10-CM

## 2019-09-06 LAB
ALBUMIN SERPL BCP-MCNC: 2.9 G/DL (ref 3.5–5)
ALP SERPL-CCNC: 108 U/L (ref 46–116)
ALT SERPL W P-5'-P-CCNC: 24 U/L (ref 12–78)
ANION GAP SERPL CALCULATED.3IONS-SCNC: 8 MMOL/L (ref 4–13)
AST SERPL W P-5'-P-CCNC: 11 U/L (ref 5–45)
BASOPHILS # BLD AUTO: 0.04 THOUSANDS/ΜL (ref 0–0.1)
BASOPHILS NFR BLD AUTO: 1 % (ref 0–1)
BILIRUB SERPL-MCNC: 0.3 MG/DL (ref 0.2–1)
BUN SERPL-MCNC: 7 MG/DL (ref 5–25)
CALCIUM SERPL-MCNC: 8.8 MG/DL (ref 8.3–10.1)
CHLORIDE SERPL-SCNC: 108 MMOL/L (ref 100–108)
CO2 SERPL-SCNC: 25 MMOL/L (ref 21–32)
CREAT SERPL-MCNC: 0.59 MG/DL (ref 0.6–1.3)
EOSINOPHIL # BLD AUTO: 0.04 THOUSAND/ΜL (ref 0–0.61)
EOSINOPHIL NFR BLD AUTO: 1 % (ref 0–6)
ERYTHROCYTE [DISTWIDTH] IN BLOOD BY AUTOMATED COUNT: 15.9 % (ref 11.6–15.1)
GFR SERPL CREATININE-BSD FRML MDRD: 99 ML/MIN/1.73SQ M
GLUCOSE SERPL-MCNC: 104 MG/DL (ref 65–140)
HCT VFR BLD AUTO: 27.6 % (ref 34.8–46.1)
HGB BLD-MCNC: 8.5 G/DL (ref 11.5–15.4)
IMM GRANULOCYTES # BLD AUTO: 0.11 THOUSAND/UL (ref 0–0.2)
IMM GRANULOCYTES NFR BLD AUTO: 2 % (ref 0–2)
LYMPHOCYTES # BLD AUTO: 1.6 THOUSANDS/ΜL (ref 0.6–4.47)
LYMPHOCYTES NFR BLD AUTO: 26 % (ref 14–44)
MAGNESIUM SERPL-MCNC: 1.7 MG/DL (ref 1.6–2.6)
MCH RBC QN AUTO: 28 PG (ref 26.8–34.3)
MCHC RBC AUTO-ENTMCNC: 30.8 G/DL (ref 31.4–37.4)
MCV RBC AUTO: 91 FL (ref 82–98)
MONOCYTES # BLD AUTO: 0.55 THOUSAND/ΜL (ref 0.17–1.22)
MONOCYTES NFR BLD AUTO: 9 % (ref 4–12)
NEUTROPHILS # BLD AUTO: 3.78 THOUSANDS/ΜL (ref 1.85–7.62)
NEUTS SEG NFR BLD AUTO: 61 % (ref 43–75)
NRBC BLD AUTO-RTO: 0 /100 WBCS
PLATELET # BLD AUTO: 340 THOUSANDS/UL (ref 149–390)
PMV BLD AUTO: 8.8 FL (ref 8.9–12.7)
POTASSIUM SERPL-SCNC: 3.7 MMOL/L (ref 3.5–5.3)
PROT SERPL-MCNC: 6.8 G/DL (ref 6.4–8.2)
RBC # BLD AUTO: 3.04 MILLION/UL (ref 3.81–5.12)
SODIUM SERPL-SCNC: 141 MMOL/L (ref 136–145)
WBC # BLD AUTO: 6.12 THOUSAND/UL (ref 4.31–10.16)

## 2019-09-06 PROCEDURE — 80053 COMPREHEN METABOLIC PANEL: CPT

## 2019-09-06 PROCEDURE — 99214 OFFICE O/P EST MOD 30 MIN: CPT | Performed by: NURSE PRACTITIONER

## 2019-09-06 PROCEDURE — 85025 COMPLETE CBC W/AUTO DIFF WBC: CPT

## 2019-09-06 PROCEDURE — 83735 ASSAY OF MAGNESIUM: CPT

## 2019-09-06 RX ORDER — PALONOSETRON 0.05 MG/ML
0.25 INJECTION, SOLUTION INTRAVENOUS ONCE
Status: CANCELLED | OUTPATIENT
Start: 2019-09-09

## 2019-09-06 RX ORDER — SODIUM CHLORIDE 9 MG/ML
20 INJECTION, SOLUTION INTRAVENOUS ONCE
Status: CANCELLED | OUTPATIENT
Start: 2019-09-09

## 2019-09-06 NOTE — ASSESSMENT & PLAN NOTE
42-year-old with recurrent stage IB1 endometrial cancer diagnosed in 2017, now with biopsy-proven evidence of right pelvic sidewall recurrence  She is receiving chemotherapy with Taxol 175mg/m2 and Carboplatin AUC 6 every 3 weeks  She has chemotherapy-induced neutropenia which was treated with one dose of Granix during her last cycle  She is anemic but currently asymptomatic  Otherwise, she is feeling well  Her performance status is 1  Neulasta OnPro added to future treatments  Labs from today reviewed and are WNL for treatment  Patient will return to the office as per her chemotherapy calendar with a pre-visit CT scan

## 2019-09-06 NOTE — PROGRESS NOTES
Assessment/Plan:    Problem List Items Addressed This Visit        Genitourinary    Endometrial [facility] ([facility] ) - Primary (Chronic)     60year-old with recurrent stage IB1 endometrial cancer diagnosed in 2017, now with biopsy-proven evidence of right pelvic sidewall recurrence  She is receiving chemotherapy with Taxol 175mg/m2 and Carboplatin AUC 6 every 3 weeks  She has chemotherapy-induced neutropenia which was treated with one dose of Granix during her last cycle  She is anemic but currently asymptomatic  Otherwise, she is feeling well  Her performance status is 1  Neulasta OnPro added to future treatments  Labs from today reviewed and are WNL for treatment  Patient will return to the office as per her chemotherapy calendar with a pre-visit CT scan  Relevant Orders    CT chest abdomen pelvis w contrast       Other    Chemotherapy induced neutropenia (HCC)     ANC dony 0 11 with last cycle  She was given 1 dose of Granix on 8/31, and recovered to 4 06 on 9/3  Plan for Neulasta OnPro with upcoming treatments  Anemia due to chemotherapy     Hgb 8 5 today  She is without significant fatigue (improved greatly since last week) dizziness, SOB  Plan to continue to monitor lab counts  Discussed future blood transfusion or dose-reduction of chemotherapy if indicated  CHIEF COMPLAINT:       Problem:  Cancer Staging  Endometrial cancer [facility])  Staging form: Corpus Uteri - Carcinoma, AJCC 7th Edition  - Clinical stage from 12/19/2017: FIGO Stage IB (T1b, N0, M0) - Signed by Cristian Figueroa MD on 2/12/2018        Previous therapy:     Endometrial cancer ([facility] )    11/17/2017 Initial Diagnosis     Endometrial cancer ([facility] )      12/19/2017 Surgery     Robotic assisted total laparoscopic hysterectomy with bilateral salpingo-oophorectomy and sentinel bilateral pelvic lymph node dissection   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 (4 4 x 3 2 cm tumor, 9 4/15 4mm invasion, NO LVSI, washings revealed atypical cellular changes)      12/19/2017 Genetic Testing     Morrison testing negative      7/8/2019 Recurrence     Presented with right lower extremity DVT and CT demonstrating right pelvic sidewall mass with venous, ureteral and nerve compression causing significant neuropathic pain  Core biopsy demonstrates high-grade carcinoma  7/30/2019 -  Chemotherapy     pegfilgrastim (NEULASTA ONPRO) subcutaneous injection kit 6 mg, 6 mg, Subcutaneous, Once, 0 of 4 cycles  CARBOplatin (PARAPLATIN) 574 2 mg in sodium chloride 0 9 % 250 mL IVPB, 574 2 mg, Intravenous, Once, 2 of 6 cycles  Administration: 574 2 mg (7/30/2019), 553 2 mg (8/19/2019)  PACLitaxel (TAXOL) 337 8 mg in sodium chloride 0 9 % 500 mL chemo IVPB, 175 mg/m2 = 337 8 mg, Intravenous, Once, 2 of 6 cycles  Administration: 337 8 mg (7/30/2019), 327 mg (8/19/2019)           Patient ID: Laurel Brown is a 60 y o  female     This is a 60 y o  female who presents to the office for pre-chemotherapy evaluation  She has been tolerating chemotherapy well and is without acute complaints  Feeling better this week as opposed to last week in terms of fatigue  She has been afebrile  She denies nausea or vomiting  Her appetite is fair  She is voiding and moving her bowels without difficulty  She denies abdominal or pelvic pain  The patient is without vaginal bleeding or discharge  She denies chemotherapy-induced neuropathy  She is ambulatory  The following portions of the patient's history were reviewed and updated as appropriate: allergies, current medications, past family history, past medical history, past social history, past surgical history and problem list     Review of Systems   Constitutional: Negative for chills, fatigue, fever and unexpected weight change  HENT: Negative for nosebleeds  Eyes: Negative  Respiratory: Negative for cough, chest tightness, shortness of breath and wheezing      Cardiovascular: Negative for chest pain, palpitations and leg swelling  Gastrointestinal: Negative for abdominal distention, abdominal pain, anal bleeding, blood in stool, constipation, diarrhea, nausea, rectal pain and vomiting  Endocrine: Negative  Genitourinary: Negative for difficulty urinating, dysuria, frequency, hematuria, pelvic pain, urgency, vaginal bleeding, vaginal discharge and vaginal pain  Musculoskeletal: Negative for arthralgias and joint swelling  Skin: Negative for color change, pallor and rash  Neurological: Negative for dizziness, weakness, light-headedness, numbness and headaches  Hematological: Negative  Psychiatric/Behavioral: Negative          Current Outpatient Medications   Medication Sig Dispense Refill    ARIPiprazole (ABILIFY) 10 mg tablet Take 10 mg by mouth daily      aspirin (ECOTRIN LOW STRENGTH) 81 mg EC tablet Take 81 mg by mouth daily      cholecalciferol (VITAMIN D3) 1,000 units tablet Take 1,000 Units by mouth daily      dexmethylphenidate (FOCALIN XR) 10 MG 24 hr capsule Take 10 mg by mouth daily      Ferrous Sulfate (IRON) 28 MG TABS Take by mouth      gemfibrozil (LOPID) 600 mg tablet TAKE 1 TABLET BY MOUTH DAILY 90 tablet 1    lactulose 20 g/30 mL Take 15 mL (10 g total) by mouth 3 (three) times a day for 22 days 946 mL 0    LORazepam (ATIVAN) 1 mg tablet Take 1 tablet (1 mg total) by mouth every 6 (six) hours as needed for anxiety (or nausea) 36 tablet 1    mupirocin (BACTROBAN) 2 % ointment Apply topically 3 (three) times a day 22 g 0    MYRBETRIQ 50 MG TB24 TAKE 1 TABLET DAILY 30 tablet 10    omeprazole (PriLOSEC) 20 mg delayed release capsule Take 20 mg by mouth daily      ondansetron (ZOFRAN) 8 mg tablet Take 1 tablet (8 mg total) by mouth every 8 (eight) hours as needed for nausea or vomiting 30 tablet 1    oxybutynin (DITROPAN-XL) 10 MG 24 hr tablet TAKE 1 TABLET BY MOUTH EVERY DAY 30 tablet 11    quinapril (ACCUPRIL) 5 mg tablet TAKE 1 TABLET BY MOUTH DAILY AT BEDTIME 90 tablet 1    rivaroxaban (XARELTO) 20 mg tablet Take 1 tablet (20 mg total) by mouth daily with breakfast 30 tablet 4    sodium chloride, PF, 0 9 % 10 mL by Intracatheter route daily for 30 days 300 mL 3    venlafaxine (EFFEXOR) 75 mg tablet Take 75 mg by mouth 3 (three) times a day         No current facility-administered medications for this visit  Objective:    Blood pressure 132/88, pulse 78, temperature 97 9 °F (36 6 °C), temperature source Oral, height 4' 11" (1 499 m), weight 93 9 kg (207 lb), not currently breastfeeding  Body mass index is 41 81 kg/m²  Body surface area is 1 87 meters squared  Physical Exam   Constitutional: She is oriented to person, place, and time  She appears well-developed and well-nourished  No distress  HENT:   Head: Normocephalic and atraumatic  Pulmonary/Chest: Effort normal  No respiratory distress  Musculoskeletal: Normal range of motion  Neurological: She is alert and oriented to person, place, and time  Skin: Skin is warm and dry  No rash noted  She is not diaphoretic  No erythema  No pallor  Psychiatric: She has a normal mood and affect   Her behavior is normal  Judgment and thought content normal        No results found for:   Lab Results   Component Value Date     10/27/2017    K 3 7 09/06/2019     09/06/2019    CO2 25 09/06/2019    BUN 7 09/06/2019    CREATININE 0 59 (L) 09/06/2019    GLUCOSE 219 (H) 12/19/2017    GLUF 104 (H) 08/17/2019    CALCIUM 8 8 09/06/2019    AST 11 09/06/2019    ALT 24 09/06/2019    ALKPHOS 108 09/06/2019    PROT 6 9 10/27/2017    BILITOT 0 3 10/27/2017    EGFR 99 09/06/2019     Lab Results   Component Value Date    WBC 6 12 09/06/2019    HGB 8 5 (L) 09/06/2019    HCT 27 6 (L) 09/06/2019    MCV 91 09/06/2019     09/06/2019     Lab Results   Component Value Date    NEUTROABS 3 78 09/06/2019

## 2019-09-06 NOTE — ASSESSMENT & PLAN NOTE
ANC dony 0 11 with last cycle  She was given 1 dose of Granix on 8/31, and recovered to 4 06 on 9/3  Plan for Neulasta OnPro with upcoming treatments

## 2019-09-06 NOTE — TELEPHONE ENCOUNTER
[facility] application submitted for genetic testing on patient's lymph node biopsy specimen from July 2019

## 2019-09-06 NOTE — ASSESSMENT & PLAN NOTE
Hgb 8 5 today  She is without significant fatigue (improved greatly since last week) dizziness, SOB  Plan to continue to monitor lab counts  Discussed future blood transfusion or dose-reduction of chemotherapy if indicated

## 2019-09-06 NOTE — PROGRESS NOTES
Pt resting with no complaints  Port accessed, labs drawn, port saline-locked and deaccessed without issue  Pt declined AVS  Next appt reviewed c/ pt

## 2019-09-07 ENCOUNTER — TELEPHONE (OUTPATIENT)
Dept: UROLOGY | Facility: AMBULATORY SURGERY CENTER | Age: 64
End: 2019-09-07

## 2019-09-07 DIAGNOSIS — N39.0 URINARY TRACT INFECTION WITHOUT HEMATURIA, SITE UNSPECIFIED: Primary | ICD-10-CM

## 2019-09-07 RX ORDER — CIPROFLOXACIN 500 MG/1
500 TABLET, FILM COATED ORAL 2 TIMES DAILY
Qty: 10 TABLET | Refills: 0 | Status: SHIPPED | OUTPATIENT
Start: 2019-09-07 | End: 2019-09-12

## 2019-09-07 NOTE — TELEPHONE ENCOUNTER
Patient called complaining of lower urinary tract symptoms including dysuria and pelvic pressure  She has a history of endometrial carcinoma with right ureteral obstruction status post right nephrostomy tube insertion  She is concerned about a UTI  She had a UTI not that long ago treated with Cipro she states with excellent result  I prescribe Cipro 500 mg b i d  For 5 day course  Prescription is sent electronically to [address] [place] [address]  She states that she has follow-up for her nephrostomy tube this week

## 2019-09-09 ENCOUNTER — HOSPITAL ENCOUNTER (OUTPATIENT)
Dept: INFUSION CENTER | Facility: CLINIC | Age: 64
Discharge: HOME | End: 2019-09-09
Payer: COMMERCIAL

## 2019-09-09 VITALS
SYSTOLIC BLOOD PRESSURE: 122 MMHG | WEIGHT: 206 LBS | HEART RATE: 78 BPM | BODY MASS INDEX: 41.53 KG/M2 | TEMPERATURE: 96.9 F | RESPIRATION RATE: 16 BRPM | HEIGHT: 59 IN | DIASTOLIC BLOOD PRESSURE: 70 MMHG | OXYGEN SATURATION: 100 %

## 2019-09-09 DIAGNOSIS — C54.1 ENDOMETRIAL CANCER (HCC): Primary | ICD-10-CM

## 2019-09-09 PROCEDURE — 96377 APPLICATON ON-BODY INJECTOR: CPT

## 2019-09-09 PROCEDURE — 96375 TX/PRO/DX INJ NEW DRUG ADDON: CPT

## 2019-09-09 PROCEDURE — 96417 CHEMO IV INFUS EACH ADDL SEQ: CPT

## 2019-09-09 PROCEDURE — 96367 TX/PROPH/DG ADDL SEQ IV INF: CPT

## 2019-09-09 PROCEDURE — 96415 CHEMO IV INFUSION ADDL HR: CPT

## 2019-09-09 PROCEDURE — 96413 CHEMO IV INFUSION 1 HR: CPT

## 2019-09-09 RX ORDER — SODIUM CHLORIDE 9 MG/ML
20 INJECTION, SOLUTION INTRAVENOUS ONCE
Status: COMPLETED | OUTPATIENT
Start: 2019-09-09 | End: 2019-09-09

## 2019-09-09 RX ORDER — PALONOSETRON 0.05 MG/ML
0.25 INJECTION, SOLUTION INTRAVENOUS ONCE
Status: COMPLETED | OUTPATIENT
Start: 2019-09-09 | End: 2019-09-09

## 2019-09-09 RX ADMIN — DIPHENHYDRAMINE HYDROCHLORIDE 25 MG: 50 INJECTION, SOLUTION INTRAMUSCULAR; INTRAVENOUS at 08:40

## 2019-09-09 RX ADMIN — PEGFILGRASTIM 6 MG: KIT SUBCUTANEOUS at 14:10

## 2019-09-09 RX ADMIN — SODIUM CHLORIDE 150 MG: 0.9 INJECTION, SOLUTION INTRAVENOUS at 09:21

## 2019-09-09 RX ADMIN — FAMOTIDINE 20 MG: 10 INJECTION INTRAVENOUS at 09:00

## 2019-09-09 RX ADMIN — DEXAMETHASONE SODIUM PHOSPHATE 20 MG: 10 INJECTION, SOLUTION INTRAMUSCULAR; INTRAVENOUS at 08:17

## 2019-09-09 RX ADMIN — SODIUM CHLORIDE 20 ML/HR: 0.9 INJECTION, SOLUTION INTRAVENOUS at 08:10

## 2019-09-09 RX ADMIN — PACLITAXEL 327 MG: 6 INJECTION, SOLUTION INTRAVENOUS at 09:58

## 2019-09-09 RX ADMIN — PALONOSETRON 0.25 MG: 0.05 INJECTION, SOLUTION INTRAVENOUS at 08:17

## 2019-09-09 RX ADMIN — CARBOPLATIN 678.6 MG: 10 INJECTION, SOLUTION INTRAVENOUS at 13:07

## 2019-09-09 NOTE — PROGRESS NOTES
Pt here for Taxol/Carbo for treatment of endometrial ca  Vitals stable; labs within parameters for treatment  [place]bell within reach; will continue to monitor

## 2019-09-10 ENCOUNTER — TELEPHONE (OUTPATIENT)
Dept: FAMILY MEDICINE CLINIC | Facility: CLINIC | Age: 64
End: 2019-09-10

## 2019-09-10 NOTE — PROGRESS NOTES
There are no diagnoses linked to this encounter  Assessment and plan:       1  Right-sided hydronephrosis secondary to endometrial carcinoma  - Patient had nephrostomy tube placed in July of 2019  This is currently scheduled to be exchanged on 10/25/2019   - Patient has been completing daily flushing of the tube without difficulty  - She has no concerns at today's visit  - She will follow-up as scheduled with Dr Ladi Pascual  2  Urinary urgency  - Patient called into the office over the weekend reporting symptoms of urinary tract infection as provided with a prescription of ciprofloxacin  She reports that some of her symptoms have resolved but she continues to have urgency  - Patient is on 50 mg of Myrbetriq and 10 mg of oxybutynin daily  She previously underwent treatment for overactive bladder using percutaneous tibial nerve stimulation  This was discontinued with her cancer recurrence  - Is finishing her ciprofloxacin course today  If she continues to have symptoms over the next 5-7 days, she will contact our office to arrange for urine testing  She was provided with a cleansing wipe and a cup for collection at home  Mar Humphrey PA-C      Chief Complaint     Chief Complaint   Patient presents with    Hydronephrosis    endometrial carcinoma         History of Present Illness     Theresa Pichardo is a 60 y o  female patient known to our practice for hydronephrosis secondary to recurrence of endometrial carcinoma with large pelvic mass on the right causing obstruction of the right ureter  Patient had insertion of percutaneous nephrostomy tube performed on 07/18/2019  Patient continues to undergo chemotherapy  She was seen in the office on 08/22/2019 with reports of redness and irritation at the insertion site of her percutaneous nephrostomy tube  Home nursing continues to evaluate any irrigate the tube daily      Patient called into the office on 09/07/2019 for dysuria and pelvic pressure  Patient had recently been treated with ciprofloxacin for a urinary tract infection with good results  She was placed on another 5 day course of ciprofloxacin  She continues to have some urinary urgency  She does have a history of overactive bladder which is managed currently on 50 mg of Myrbetriq and 10 mg of oxybutynin extended-release  She was treated at 1 time with percutaneous tibial nerve stimulation but this was discontinued with her endometrial cancer recurrence  She does report that she has been drinking more iced tea than usual and is aware that this is a bladder irritant  She is emptying her bladder adequately today with a PVR of 30 mL  Laboratory     Lab Results   Component Value Date    CREATININE 0 59 (L) 09/06/2019       No results found for: PSA    Recent Results (from the past 1 hour(s))   POCT Measure PVR    Collection Time: 09/12/19  9:03 AM   Result Value Ref Range    POST-VOID RESIDUAL VOLUME, ML POC 30 mL         Review of Systems     Review of Systems   Constitutional: Negative for chills and fever  HENT: Negative  Eyes: Negative  Respiratory: Negative for shortness of breath  Cardiovascular: Negative for chest pain  Gastrointestinal: Positive for constipation (dulcolax and lactulose, seems to be associated with chemo)  Negative for diarrhea  Genitourinary: Positive for frequency and urgency  Negative for difficulty urinating, dysuria, enuresis, flank pain and hematuria  Urinary Incontinence Screening      Most Recent Value   Urinary Incontinence   Urinary Incontinence? Yes [changing pad 2-3 times a day]   Incomplete emptying? Yes [unsure]   Urinary frequency? Yes   Urinary urgency? Yes   Urinary hesitancy? No   Dysuria (painful difficult urination)? No   Nocturia (waking up to use the bathroom)? Yes [2 times a night]   Straining (having to push to go)? No   Weak stream?  No   Intermittent stream?  No   Post void dribbling?   No Vaginal pressure? No   Vaginal dryness?   Yes                    Allergies     Allergies   Allergen Reactions    Cephalosporins Other (See Comments)     RASH - BILATERAL ARMS PER RN       Physical Exam     Physical Exam      Vital Signs     Vitals:    09/12/19 0852   BP: 120/82   BP Location: Left arm   Patient Position: Sitting   Cuff Size: Standard   Weight: 91 2 kg (201 lb)   Height: 4' 11" (1 499 m)         Current Medications       Current Outpatient Medications:     ARIPiprazole (ABILIFY) 10 mg tablet, Take 10 mg by mouth daily, Disp: , Rfl:     aspirin (ECOTRIN LOW STRENGTH) 81 mg EC tablet, Take 81 mg by mouth daily, Disp: , Rfl:     cholecalciferol (VITAMIN D3) 1,000 units tablet, Take 1,000 Units by mouth daily, Disp: , Rfl:     Ferrous Sulfate (IRON) 28 MG TABS, Take by mouth, Disp: , Rfl:     gemfibrozil (LOPID) 600 mg tablet, TAKE 1 TABLET BY MOUTH DAILY, Disp: 90 tablet, Rfl: 1    lactulose 20 g/30 mL, Take 15 mL (10 g total) by mouth 3 (three) times a day for 22 days, Disp: 946 mL, Rfl: 0    LORazepam (ATIVAN) 1 mg tablet, Take 1 tablet (1 mg total) by mouth every 6 (six) hours as needed for anxiety (or nausea), Disp: 36 tablet, Rfl: 1    mupirocin (BACTROBAN) 2 % ointment, Apply topically 3 (three) times a day, Disp: 22 g, Rfl: 0    MYRBETRIQ 50 MG TB24, TAKE 1 TABLET DAILY, Disp: 30 tablet, Rfl: 10    omeprazole (PriLOSEC) 20 mg delayed release capsule, Take 20 mg by mouth daily, Disp: , Rfl:     ondansetron (ZOFRAN) 8 mg tablet, Take 1 tablet (8 mg total) by mouth every 8 (eight) hours as needed for nausea or vomiting, Disp: 30 tablet, Rfl: 1    oxybutynin (DITROPAN-XL) 10 MG 24 hr tablet, TAKE 1 TABLET BY MOUTH EVERY DAY, Disp: 30 tablet, Rfl: 11    quinapril (ACCUPRIL) 5 mg tablet, TAKE 1 TABLET BY MOUTH DAILY AT BEDTIME, Disp: 90 tablet, Rfl: 1    rivaroxaban (XARELTO) 20 mg tablet, Take 1 tablet (20 mg total) by mouth daily with breakfast, Disp: 30 tablet, Rfl: 4   venlafaxine (EFFEXOR) 75 mg tablet, Take 75 mg by mouth 3 (three) times a day  , Disp: , Rfl:     ciprofloxacin (CIPRO) 500 mg tablet, Take 1 tablet (500 mg total) by mouth 2 (two) times a day for 5 days (Patient not taking: Reported on 9/12/2019), Disp: 10 tablet, Rfl: 0    dexmethylphenidate (FOCALIN XR) 10 MG 24 hr capsule, Take 10 mg by mouth daily, Disp: , Rfl:     sodium chloride, PF, 0 9 %, 10 mL by Intracatheter route daily for 30 days, Disp: 300 mL, Rfl: 3      Active Problems     Patient Active Problem List   Diagnosis    Benign essential hypertension    Major depression, chronic    Dyslipidemia    Endometrial cancer (HCC)    Class 3 severe obesity with body mass index (BMI) of 45 0 to 49 9 in adult ([facility])    Prediabetes    Acute deep vein thrombosis (DVT) of proximal vein of lower extremity ([facility] )    Breast cancer screening    Other hydronephrosis    Encounter for central line care    Cancer related pain    Therapeutic opioid induced constipation    Chemotherapy induced neutropenia (HCC)    Anemia due to chemotherapy         Past Medical History     Past Medical History:   Diagnosis Date    Anemia     Depression     Diabetes mellitus ([facility]     Endometrial cancer ([facility] ) 12/2017    Hyperglycemia     vx type 2 dm -- last assessed 4/1/14; resolved 11/7/17    Hyperlipidemia     Hypertension     Obesity     last assessed 10/14/17; resolved 11/7/17         Surgical History     Past Surgical History:   Procedure Laterality Date    ABDOMINAL SURGERY      GASTRIC BYPASS    CHOLECYSTECTOMY      at the time of gastric bypass    COLONOSCOPY      CT NEEDLE BIOPSY LYMPH NODE  7/8/2019    GASTRIC BYPASS      HYSTERECTOMY Bilateral     total abdominal with salpingo-oophorectomy    IR PORT PLACEMENT  7/26/2019    IR TUBE PLACEMENT NEPHROSTOMY  7/26/2019    OOPHORECTOMY Bilateral     IN LAP, RADICAL HYST W/ TUBE&OV, NODE BX N/A 12/19/2017    Procedure: HYSTERECTOMY LAPAROSCOPIC TOTAL ([address]) W/ ROBOTICS; BILATERAL SALPINGOOPHERECTOMY; LYMPH NODE DISSECTION; lysis of adhesions;  Surgeon: Cristian Figueroa MD;  [facility]: [facility];  Service: Gynecology Oncology    CT LAP,DIAGNOSTIC ABDOMEN N/A 12/19/2017    Procedure: LAPAROSCOPY DIAGNOSTIC;  Surgeon: Cristian Figueroa MD;  [facility];  Service: Gynecology Oncology    TONSILLECTOMY      US GUIDED BREAST BIOPSY RIGHT COMPLETE Right 6/28/2019         Family History     Family History   Problem Relation Age of Onset    Other Mother         dyslipidemia    Ovarian cancer Mother 48    Lymphoma Father     Bone cancer Maternal Grandfather     No Known Problems Sister     No Known Problems Maternal Grandmother     No Known Problems Paternal Grandmother     No Known Problems Paternal Grandfather     No Known Problems Maternal Aunt     No Known Problems Maternal Aunt     No Known Problems Maternal Aunt     No Known Problems Maternal Aunt     No Known Problems Paternal Aunt     No Known Problems Paternal Aunt     No Known Problems Paternal Aunt     No Known Problems Paternal Aunt     No Known Problems Brother     No Known Problems Brother          Social History     Social History       Radiology

## 2019-09-10 NOTE — TELEPHONE ENCOUNTER
Pt called stating that she is retaining water, & wants to know if you'd like her to begin taking Lasix again

## 2019-09-12 ENCOUNTER — OFFICE VISIT (OUTPATIENT)
Dept: UROLOGY | Facility: CLINIC | Age: 64
End: 2019-09-12
Payer: COMMERCIAL

## 2019-09-12 VITALS
SYSTOLIC BLOOD PRESSURE: 120 MMHG | DIASTOLIC BLOOD PRESSURE: 82 MMHG | HEIGHT: 59 IN | WEIGHT: 201 LBS | BODY MASS INDEX: 40.52 KG/M2

## 2019-09-12 DIAGNOSIS — N13.30 HYDRONEPHROSIS, UNSPECIFIED HYDRONEPHROSIS TYPE: ICD-10-CM

## 2019-09-12 DIAGNOSIS — R30.0 DYSURIA: Primary | ICD-10-CM

## 2019-09-12 LAB — POST-VOID RESIDUAL VOLUME, ML POC: 30 ML

## 2019-09-12 PROCEDURE — 51798 US URINE CAPACITY MEASURE: CPT | Performed by: PHYSICIAN ASSISTANT

## 2019-09-12 PROCEDURE — 99214 OFFICE O/P EST MOD 30 MIN: CPT | Performed by: PHYSICIAN ASSISTANT

## 2019-09-13 ENCOUNTER — HOSPITAL ENCOUNTER (OUTPATIENT)
Dept: INFUSION CENTER | Facility: CLINIC | Age: 64
Discharge: HOME | End: 2019-09-13
Payer: COMMERCIAL

## 2019-09-13 DIAGNOSIS — Z45.2 ENCOUNTER FOR CENTRAL LINE CARE: Primary | ICD-10-CM

## 2019-09-13 DIAGNOSIS — C54.1 ENDOMETRIAL CANCER (HCC): ICD-10-CM

## 2019-09-13 LAB
ALBUMIN SERPL BCP-MCNC: 3.3 G/DL (ref 3.5–5)
ALP SERPL-CCNC: 133 U/L (ref 46–116)
ALT SERPL W P-5'-P-CCNC: 25 U/L (ref 12–78)
ANION GAP SERPL CALCULATED.3IONS-SCNC: 10 MMOL/L (ref 4–13)
AST SERPL W P-5'-P-CCNC: 18 U/L (ref 5–45)
BASOPHILS # BLD MANUAL: 0 THOUSAND/UL (ref 0–0.1)
BASOPHILS NFR MAR MANUAL: 0 % (ref 0–1)
BILIRUB SERPL-MCNC: 0.3 MG/DL (ref 0.2–1)
BUN SERPL-MCNC: 14 MG/DL (ref 5–25)
CALCIUM SERPL-MCNC: 9.1 MG/DL (ref 8.3–10.1)
CHLORIDE SERPL-SCNC: 103 MMOL/L (ref 100–108)
CO2 SERPL-SCNC: 24 MMOL/L (ref 21–32)
CREAT SERPL-MCNC: 0.6 MG/DL (ref 0.6–1.3)
DOHLE BOD BLD QL SMEAR: PRESENT
EOSINOPHIL # BLD MANUAL: 0.54 THOUSAND/UL (ref 0–0.4)
EOSINOPHIL NFR BLD MANUAL: 7 % (ref 0–6)
ERYTHROCYTE [DISTWIDTH] IN BLOOD BY AUTOMATED COUNT: 16.4 % (ref 11.6–15.1)
GFR SERPL CREATININE-BSD FRML MDRD: 98 ML/MIN/1.73SQ M
GLUCOSE SERPL-MCNC: 110 MG/DL (ref 65–140)
HCT VFR BLD AUTO: 28.9 % (ref 34.8–46.1)
HGB BLD-MCNC: 9 G/DL (ref 11.5–15.4)
LYMPHOCYTES # BLD AUTO: 1.24 THOUSAND/UL (ref 0.6–4.47)
LYMPHOCYTES # BLD AUTO: 16 % (ref 14–44)
MAGNESIUM SERPL-MCNC: 1.5 MG/DL (ref 1.6–2.6)
MCH RBC QN AUTO: 28.4 PG (ref 26.8–34.3)
MCHC RBC AUTO-ENTMCNC: 31.1 G/DL (ref 31.4–37.4)
MCV RBC AUTO: 91 FL (ref 82–98)
METAMYELOCYTES NFR BLD MANUAL: 1 % (ref 0–1)
MONOCYTES # BLD AUTO: 0.08 THOUSAND/UL (ref 0–1.22)
MONOCYTES NFR BLD: 1 % (ref 4–12)
NEUTROPHILS # BLD MANUAL: 5.75 THOUSAND/UL (ref 1.85–7.62)
NEUTS BAND NFR BLD MANUAL: 21 % (ref 0–8)
NEUTS SEG NFR BLD AUTO: 53 % (ref 43–75)
NRBC BLD AUTO-RTO: 0 /100 WBCS
PLATELET # BLD AUTO: 304 THOUSANDS/UL (ref 149–390)
PLATELET BLD QL SMEAR: ADEQUATE
PMV BLD AUTO: 10 FL (ref 8.9–12.7)
POTASSIUM SERPL-SCNC: 4.1 MMOL/L (ref 3.5–5.3)
PROT SERPL-MCNC: 7.2 G/DL (ref 6.4–8.2)
RBC # BLD AUTO: 3.17 MILLION/UL (ref 3.81–5.12)
SODIUM SERPL-SCNC: 137 MMOL/L (ref 136–145)
TOTAL CELLS COUNTED SPEC: 100
VARIANT LYMPHS # BLD AUTO: 1 %
WBC # BLD AUTO: 7.77 THOUSAND/UL (ref 4.31–10.16)

## 2019-09-13 PROCEDURE — 83735 ASSAY OF MAGNESIUM: CPT

## 2019-09-13 PROCEDURE — 80053 COMPREHEN METABOLIC PANEL: CPT

## 2019-09-13 PROCEDURE — 85027 COMPLETE CBC AUTOMATED: CPT

## 2019-09-13 PROCEDURE — 85007 BL SMEAR W/DIFF WBC COUNT: CPT

## 2019-09-13 NOTE — PROGRESS NOTES
Pt to clinic for lab draw from port a cath, pt offers no complaints at this time, aware of next appointment, declined avs

## 2019-09-18 ENCOUNTER — HOSPITAL ENCOUNTER (OUTPATIENT)
Dept: INFUSION CENTER | Facility: CLINIC | Age: 64
End: 2019-09-18

## 2019-09-18 ENCOUNTER — HOSPITAL ENCOUNTER (OUTPATIENT)
Dept: RADIOLOGY | Facility: HOSPITAL | Age: 64
Discharge: HOME | End: 2019-09-18
Attending: UROLOGY

## 2019-09-18 ENCOUNTER — TELEPHONE (OUTPATIENT)
Dept: UROLOGY | Facility: AMBULATORY SURGERY CENTER | Age: 64
End: 2019-09-18

## 2019-09-18 ENCOUNTER — HOSPITAL ENCOUNTER (OUTPATIENT)
Dept: INFUSION CENTER | Facility: CLINIC | Age: 64
Discharge: HOME | End: 2019-09-18
Payer: COMMERCIAL

## 2019-09-18 VITALS
HEART RATE: 103 BPM | RESPIRATION RATE: 18 BRPM | SYSTOLIC BLOOD PRESSURE: 82 MMHG | TEMPERATURE: 97.4 F | OXYGEN SATURATION: 97 % | DIASTOLIC BLOOD PRESSURE: 50 MMHG

## 2019-09-18 DIAGNOSIS — C54.1 ENDOMETRIAL CANCER (HCC): ICD-10-CM

## 2019-09-18 DIAGNOSIS — E86.0 DEHYDRATION: Primary | ICD-10-CM

## 2019-09-18 DIAGNOSIS — T45.1X5A CHEMOTHERAPY-INDUCED NAUSEA: ICD-10-CM

## 2019-09-18 DIAGNOSIS — R11.0 CHEMOTHERAPY-INDUCED NAUSEA: ICD-10-CM

## 2019-09-18 DIAGNOSIS — Z45.2 ENCOUNTER FOR CENTRAL LINE CARE: ICD-10-CM

## 2019-09-18 DIAGNOSIS — C64.9 CARCINOMA OF KIDNEY, UNSPECIFIED LATERALITY (HCC): ICD-10-CM

## 2019-09-18 PROCEDURE — 96361 HYDRATE IV INFUSION ADD-ON: CPT

## 2019-09-18 PROCEDURE — 96374 THER/PROPH/DIAG INJ IV PUSH: CPT

## 2019-09-18 RX ADMIN — ONDANSETRON 8 MG: 2 INJECTION INTRAMUSCULAR; INTRAVENOUS at 15:08

## 2019-09-18 RX ADMIN — SODIUM CHLORIDE 1000 ML: 0.9 INJECTION, SOLUTION INTRAVENOUS at 14:59

## 2019-09-18 NOTE — SEDATION DOCUMENTATION
PCNU with 30ml urine output per infusion nurse  Patient to leave PCNU to leg bag overnight and call with any problems  IR to contact patient in a m for follow up  If PCNU draining effectively without complaints, patient to be scheduled for routine exchange in 2 weeks per Dr Roscoe Carter,  If not, patient to be scheduled for tube check

## 2019-09-18 NOTE — SEDATION DOCUMENTATION
Patient to IR for right PCNU assessment  Patient reports that PCNU has been capped the majority of time while in place and gets flushed daily  States that she thinks she's had decreased UO over the last day or so and reports some back discomfort  Denies any known fevers  Insertion site unremarkable  Patient reports no issues with flushing  Tube placed to gravity drainage (leg bag) and to be reassessed in the next few hours while at the infusion center for hydration  Will bring back to IR after infusion center appt if indicated  Dr Stephenie Perez aware

## 2019-09-18 NOTE — TELEPHONE ENCOUNTER
Patient managed by Sanjuana Granados is calling to say her nephrostomy tube is not working properly and she is in pain  Please advise

## 2019-09-18 NOTE — PROGRESS NOTES
Patient tolerated treatment today without issues  Instructions given to patient from IR nurse  Patient to leave neophrostomy bag capped and IR nurse will call her in the [address] [place] [address] verbalized understanding  AVS provided

## 2019-09-18 NOTE — SEDATION DOCUMENTATION
Patient remains at [facility] receiving fluid bolus for hydration  Scant amount of john urine in bag upon arrival   Flushed PCNU with 8ml NSS without difficulty  Infusion center nurse to notify IR when patient's appt is complete in order to reassess UO

## 2019-09-18 NOTE — TELEPHONE ENCOUNTER
I returned patient's call to see what she meant by the nephrostomy tube is not working  She stated that she is not getting any urine in the bag    She was instructed to call IR

## 2019-09-19 ENCOUNTER — APPOINTMENT (EMERGENCY)
Dept: RADIOLOGY | Facility: HOSPITAL | Age: 64
DRG: 314 | End: 2019-09-19
Payer: COMMERCIAL

## 2019-09-19 ENCOUNTER — HOSPITAL ENCOUNTER (INPATIENT)
Facility: HOSPITAL | Age: 64
LOS: 10 days | Discharge: HOME HEALTH | DRG: 314 | End: 2019-09-29
Attending: EMERGENCY MEDICINE | Admitting: INTERNAL MEDICINE
Payer: COMMERCIAL

## 2019-09-19 ENCOUNTER — TELEPHONE (OUTPATIENT)
Dept: GYNECOLOGIC ONCOLOGY | Facility: CLINIC | Age: 64
End: 2019-09-19

## 2019-09-19 DIAGNOSIS — B99.9 INFECTION: ICD-10-CM

## 2019-09-19 DIAGNOSIS — G89.3 CANCER RELATED PAIN: Primary | ICD-10-CM

## 2019-09-19 DIAGNOSIS — C54.1 ENDOMETRIAL CANCER (HCC): Primary | ICD-10-CM

## 2019-09-19 DIAGNOSIS — R13.10 DYSPHAGIA, UNSPECIFIED TYPE: ICD-10-CM

## 2019-09-19 DIAGNOSIS — I38 ENDOCARDITIS: ICD-10-CM

## 2019-09-19 DIAGNOSIS — R50.9 FEVER: ICD-10-CM

## 2019-09-19 DIAGNOSIS — C54.1 ENDOMETRIAL CANCER (HCC): Chronic | ICD-10-CM

## 2019-09-19 DIAGNOSIS — A41.9 SEPSIS, DUE TO UNSPECIFIED ORGANISM: ICD-10-CM

## 2019-09-19 DIAGNOSIS — N13.39 OTHER HYDRONEPHROSIS: ICD-10-CM

## 2019-09-19 DIAGNOSIS — R78.81 BACTEREMIA: ICD-10-CM

## 2019-09-19 DIAGNOSIS — I82.4Y1 ACUTE DEEP VEIN THROMBOSIS (DVT) OF PROXIMAL VEIN OF RIGHT LOWER EXTREMITY (HCC): ICD-10-CM

## 2019-09-19 PROBLEM — E87.6 HYPOKALEMIA: Status: ACTIVE | Noted: 2019-09-19

## 2019-09-19 PROBLEM — E87.1 HYPONATREMIA: Status: ACTIVE | Noted: 2019-09-19

## 2019-09-19 PROBLEM — E87.70 VOLUME OVERLOAD: Status: ACTIVE | Noted: 2019-09-19

## 2019-09-19 PROBLEM — N17.9 ACUTE KIDNEY INJURY (HCC): Status: ACTIVE | Noted: 2019-09-19

## 2019-09-19 LAB
ALBUMIN SERPL BCP-MCNC: 2.9 G/DL (ref 3.5–5)
ALP SERPL-CCNC: 91 U/L (ref 46–116)
ALT SERPL W P-5'-P-CCNC: 41 U/L (ref 12–78)
ANION GAP SERPL CALCULATED.3IONS-SCNC: 14 MMOL/L (ref 4–13)
APTT PPP: 69 SECONDS (ref 23–37)
AST SERPL W P-5'-P-CCNC: 55 U/L (ref 5–45)
BACTERIA UR QL AUTO: ABNORMAL /HPF
BASOPHILS # BLD MANUAL: 0 THOUSAND/UL (ref 0–0.1)
BASOPHILS NFR MAR MANUAL: 0 % (ref 0–1)
BILIRUB SERPL-MCNC: 0.27 MG/DL (ref 0.2–1)
BILIRUB UR QL STRIP: NEGATIVE
BUN SERPL-MCNC: 37 MG/DL (ref 5–25)
BURR CELLS BLD QL SMEAR: PRESENT
CALCIUM SERPL-MCNC: 8.6 MG/DL (ref 8.3–10.1)
CHLORIDE SERPL-SCNC: 97 MMOL/L (ref 100–108)
CLARITY UR: ABNORMAL
CO2 SERPL-SCNC: 18 MMOL/L (ref 21–32)
COLOR UR: YELLOW
CREAT SERPL-MCNC: 1.91 MG/DL (ref 0.6–1.3)
EOSINOPHIL # BLD MANUAL: 0 THOUSAND/UL (ref 0–0.4)
EOSINOPHIL NFR BLD MANUAL: 0 % (ref 0–6)
ERYTHROCYTE [DISTWIDTH] IN BLOOD BY AUTOMATED COUNT: 16.2 % (ref 11.6–15.1)
GFR SERPL CREATININE-BSD FRML MDRD: 28 ML/MIN/1.73SQ M
GLUCOSE SERPL-MCNC: 113 MG/DL (ref 65–140)
GLUCOSE UR STRIP-MCNC: NEGATIVE MG/DL
HCT VFR BLD AUTO: 22.4 % (ref 34.8–46.1)
HGB BLD-MCNC: 7.5 G/DL (ref 11.5–15.4)
HGB UR QL STRIP.AUTO: ABNORMAL
INR PPP: 3.29 (ref 0.84–1.19)
KETONES UR STRIP-MCNC: NEGATIVE MG/DL
LACTATE SERPL-SCNC: 0.7 MMOL/L (ref 0.5–2)
LEUKOCYTE ESTERASE UR QL STRIP: ABNORMAL
LYMPHOCYTES # BLD AUTO: 0.65 THOUSAND/UL (ref 0.6–4.47)
LYMPHOCYTES # BLD AUTO: 4 % (ref 14–44)
MCH RBC QN AUTO: 28.1 PG (ref 26.8–34.3)
MCHC RBC AUTO-ENTMCNC: 33.5 G/DL (ref 31.4–37.4)
MCV RBC AUTO: 84 FL (ref 82–98)
METAMYELOCYTES NFR BLD MANUAL: 3 % (ref 0–1)
MONOCYTES # BLD AUTO: 0.81 THOUSAND/UL (ref 0–1.22)
MONOCYTES NFR BLD: 5 % (ref 4–12)
MYELOCYTES NFR BLD MANUAL: 2 % (ref 0–1)
NEUTROPHILS # BLD MANUAL: 13.73 THOUSAND/UL (ref 1.85–7.62)
NEUTS BAND NFR BLD MANUAL: 25 % (ref 0–8)
NEUTS SEG NFR BLD AUTO: 60 % (ref 43–75)
NITRITE UR QL STRIP: NEGATIVE
NON-SQ EPI CELLS URNS QL MICRO: ABNORMAL /HPF
NRBC BLD AUTO-RTO: 0 /100 WBCS
OVALOCYTES BLD QL SMEAR: PRESENT
PH UR STRIP.AUTO: 7.5 [PH]
PLATELET # BLD AUTO: 131 THOUSANDS/UL (ref 149–390)
PLATELET BLD QL SMEAR: ABNORMAL
PMV BLD AUTO: 10.9 FL (ref 8.9–12.7)
POIKILOCYTOSIS BLD QL SMEAR: PRESENT
POLYCHROMASIA BLD QL SMEAR: PRESENT
POTASSIUM SERPL-SCNC: 2.9 MMOL/L (ref 3.5–5.3)
PROCALCITONIN SERPL-MCNC: 4.39 NG/ML
PROT SERPL-MCNC: 6.8 G/DL (ref 6.4–8.2)
PROT UR STRIP-MCNC: ABNORMAL MG/DL
PROTHROMBIN TIME: 32.9 SECONDS (ref 11.6–14.5)
RBC # BLD AUTO: 2.67 MILLION/UL (ref 3.81–5.12)
RBC #/AREA URNS AUTO: ABNORMAL /HPF
RBC MORPH BLD: PRESENT
SODIUM SERPL-SCNC: 129 MMOL/L (ref 136–145)
SP GR UR STRIP.AUTO: 1.01 (ref 1–1.03)
TOXIC GRANULES BLD QL SMEAR: PRESENT
UROBILINOGEN UR QL STRIP.AUTO: 0.2 E.U./DL
VARIANT LYMPHS # BLD AUTO: 1 %
WBC # BLD AUTO: 16.15 THOUSAND/UL (ref 4.31–10.16)
WBC #/AREA URNS AUTO: ABNORMAL /HPF

## 2019-09-19 PROCEDURE — 80053 COMPREHEN METABOLIC PANEL: CPT | Performed by: EMERGENCY MEDICINE

## 2019-09-19 PROCEDURE — 83605 ASSAY OF LACTIC ACID: CPT | Performed by: EMERGENCY MEDICINE

## 2019-09-19 PROCEDURE — 85027 COMPLETE CBC AUTOMATED: CPT | Performed by: EMERGENCY MEDICINE

## 2019-09-19 PROCEDURE — 96366 THER/PROPH/DIAG IV INF ADDON: CPT

## 2019-09-19 PROCEDURE — 85730 THROMBOPLASTIN TIME PARTIAL: CPT | Performed by: EMERGENCY MEDICINE

## 2019-09-19 PROCEDURE — 99285 EMERGENCY DEPT VISIT HI MDM: CPT | Performed by: EMERGENCY MEDICINE

## 2019-09-19 PROCEDURE — 96367 TX/PROPH/DG ADDL SEQ IV INF: CPT

## 2019-09-19 PROCEDURE — NC001 PR NO CHARGE: Performed by: OBSTETRICS & GYNECOLOGY

## 2019-09-19 PROCEDURE — 87086 URINE CULTURE/COLONY COUNT: CPT | Performed by: EMERGENCY MEDICINE

## 2019-09-19 PROCEDURE — 96365 THER/PROPH/DIAG IV INF INIT: CPT

## 2019-09-19 PROCEDURE — 84145 PROCALCITONIN (PCT): CPT | Performed by: EMERGENCY MEDICINE

## 2019-09-19 PROCEDURE — 71045 X-RAY EXAM CHEST 1 VIEW: CPT

## 2019-09-19 PROCEDURE — 81001 URINALYSIS AUTO W/SCOPE: CPT | Performed by: EMERGENCY MEDICINE

## 2019-09-19 PROCEDURE — 87147 CULTURE TYPE IMMUNOLOGIC: CPT | Performed by: EMERGENCY MEDICINE

## 2019-09-19 PROCEDURE — 87040 BLOOD CULTURE FOR BACTERIA: CPT | Performed by: EMERGENCY MEDICINE

## 2019-09-19 PROCEDURE — 87186 SC STD MICRODIL/AGAR DIL: CPT | Performed by: EMERGENCY MEDICINE

## 2019-09-19 PROCEDURE — 85610 PROTHROMBIN TIME: CPT | Performed by: EMERGENCY MEDICINE

## 2019-09-19 PROCEDURE — 99285 EMERGENCY DEPT VISIT HI MDM: CPT

## 2019-09-19 PROCEDURE — 85007 BL SMEAR W/DIFF WBC COUNT: CPT | Performed by: EMERGENCY MEDICINE

## 2019-09-19 PROCEDURE — 36415 COLL VENOUS BLD VENIPUNCTURE: CPT | Performed by: EMERGENCY MEDICINE

## 2019-09-19 PROCEDURE — 99223 1ST HOSP IP/OBS HIGH 75: CPT | Performed by: INTERNAL MEDICINE

## 2019-09-19 PROCEDURE — 96361 HYDRATE IV INFUSION ADD-ON: CPT

## 2019-09-19 RX ORDER — ASPIRIN 81 MG/1
81 TABLET ORAL DAILY
Status: DISCONTINUED | OUTPATIENT
Start: 2019-09-20 | End: 2019-09-29 | Stop reason: HOSPADM

## 2019-09-19 RX ORDER — ARIPIPRAZOLE 10 MG/1
10 TABLET ORAL DAILY
Status: DISCONTINUED | OUTPATIENT
Start: 2019-09-20 | End: 2019-09-29 | Stop reason: HOSPADM

## 2019-09-19 RX ORDER — 0.9 % SODIUM CHLORIDE 0.9 %
10 VIAL (ML) INJECTION DAILY
Status: DISCONTINUED | OUTPATIENT
Start: 2019-09-20 | End: 2019-09-29 | Stop reason: HOSPADM

## 2019-09-19 RX ORDER — MELATONIN
1000 DAILY
Status: DISCONTINUED | OUTPATIENT
Start: 2019-09-20 | End: 2019-09-29 | Stop reason: HOSPADM

## 2019-09-19 RX ORDER — PANTOPRAZOLE SODIUM 40 MG/1
40 TABLET, DELAYED RELEASE ORAL
Status: DISCONTINUED | OUTPATIENT
Start: 2019-09-20 | End: 2019-09-22

## 2019-09-19 RX ORDER — OXYBUTYNIN CHLORIDE 5 MG/1
10 TABLET, EXTENDED RELEASE ORAL DAILY
Status: DISCONTINUED | OUTPATIENT
Start: 2019-09-20 | End: 2019-09-29 | Stop reason: HOSPADM

## 2019-09-19 RX ORDER — POTASSIUM CHLORIDE 20 MEQ/1
40 TABLET, EXTENDED RELEASE ORAL ONCE
Status: COMPLETED | OUTPATIENT
Start: 2019-09-19 | End: 2019-09-19

## 2019-09-19 RX ORDER — VENLAFAXINE 37.5 MG/1
75 TABLET ORAL 3 TIMES DAILY
Status: DISCONTINUED | OUTPATIENT
Start: 2019-09-19 | End: 2019-09-29 | Stop reason: HOSPADM

## 2019-09-19 RX ORDER — FUROSEMIDE 10 MG/ML
20 INJECTION INTRAMUSCULAR; INTRAVENOUS ONCE
Status: COMPLETED | OUTPATIENT
Start: 2019-09-19 | End: 2019-09-19

## 2019-09-19 RX ADMIN — SODIUM CHLORIDE 1000 ML: 0.9 INJECTION, SOLUTION INTRAVENOUS at 17:16

## 2019-09-19 RX ADMIN — SODIUM CHLORIDE 1000 ML: 0.9 INJECTION, SOLUTION INTRAVENOUS at 19:29

## 2019-09-19 RX ADMIN — FUROSEMIDE 20 MG: 10 INJECTION, SOLUTION INTRAMUSCULAR; INTRAVENOUS at 22:18

## 2019-09-19 RX ADMIN — VANCOMYCIN HYDROCHLORIDE 1250 MG: 1 INJECTION, POWDER, LYOPHILIZED, FOR SOLUTION INTRAVENOUS at 19:28

## 2019-09-19 RX ADMIN — SODIUM CHLORIDE 1000 ML: 0.9 INJECTION, SOLUTION INTRAVENOUS at 18:41

## 2019-09-19 RX ADMIN — CEFEPIME HYDROCHLORIDE 2000 MG: 2 INJECTION, POWDER, FOR SOLUTION INTRAVENOUS at 18:29

## 2019-09-19 RX ADMIN — POTASSIUM CHLORIDE 40 MEQ: 1500 TABLET, EXTENDED RELEASE ORAL at 19:32

## 2019-09-19 NOTE — SEPSIS NOTE
Sepsis Note   Eliecer Melendez 60 y o  female MRN: [id number]  Unit/Bed#: ED 22 Encounter: [id number]      qSOFA     [address] Name 09/19/19 1633 09/19/19 1626             Altered mental status GCS < 15  0         Respiratory Rate > / =22    0       Systolic BP < / =865    1       Q Sofa Score  1  1           Initial Sepsis Screening     Row Name 09/19/19 1644                Is the patient's history suggestive of a new or worsening infection? (!) Yes (Proceed)  -CORNELIA        Suspected source of infection  suspect infection, source unknown  -CORNELIA        Are two or more of the following signs & symptoms of infection both present and new to the patient? (!) Yes (Proceed)  -CORNELIA        Indicate SIRS criteria  Hyperthemia > 38 3C (100 9F); Tachycardia > 90 bpm  -CORNELIA        If the answer is yes to both questions, suspicion of sepsis is present          If severe sepsis is present AND tissue hypoperfusion perists in the hour after fluid resuscitation or lactate > 4, the patient meets criteria for SEPTIC SHOCK          Are any of the following organ dysfunction criteria present within 6 hours of suspected infection and SIRS criteria that are NOT considered to be chronic conditions? (!) Yes  -CORNELIA        Organ dysfunction          Date of presentation of severe sepsis          Time of presentation of severe sepsis          Tissue hypoperfusion persists in the hour after crystalloid fluid administration, evidenced, by either:          Was hypotension present within one hour of the conclusion of crystalloid fluid administration?           Date of presentation of septic shock          Time of presentation of septic shock            User Key  (r) = Recorded By, (t) = Taken By, (c) = Cosigned By    Initials Name Provider Type    Beau Peoples DO Physician

## 2019-09-19 NOTE — ED PROVIDER NOTES
History  Chief Complaint   Patient presents with    Fever - 9 weeks to 76 years     pt had chemo on 9/6/19, pt is weak, sob, coughing, and has fever     64F presenting with generalized weakness and fever which started over the last day  History of endometrial cancer which she says has spread to her kidney which lead to a nephrostomy tube being placed  Says she had an episode of diarrhea today  Denies any new productive cough, wheezing, abdominal pain, urinary pain, change in color of urine from nephrostomy, pain near nephrostomy site, blood in urine or stool  Prior to Admission Medications   Prescriptions Last Dose Informant Patient Reported? Taking? ARIPiprazole (ABILIFY) 10 mg tablet 9/19/2019 at Unknown time Self Yes Yes   Sig: Take 10 mg by mouth daily   CRANIAL PROSTHESIS, RX, Unknown at Unknown time  No No   Sig: Please provide patient with extracranial prosthesis while undergoing active chemotherapy treatment     Ferrous Sulfate (IRON) 28 MG TABS 9/19/2019 at Unknown time Self Yes Yes   Sig: Take by mouth   LORazepam (ATIVAN) 1 mg tablet 9/19/2019 at Unknown time Self No Yes   Sig: Take 1 tablet (1 mg total) by mouth every 6 (six) hours as needed for anxiety (or nausea)   MYRBETRIQ 50 MG TB24  Self No No   Sig: TAKE 1 TABLET DAILY   aspirin (ECOTRIN LOW STRENGTH) 81 mg EC tablet 9/18/2019 at Unknown time Self Yes Yes   Sig: Take 81 mg by mouth daily   cholecalciferol (VITAMIN D3) 1,000 units tablet 9/19/2019 at Unknown time Self Yes Yes   Sig: Take 1,000 Units by mouth daily   dexmethylphenidate (FOCALIN XR) 10 MG 24 hr capsule Not Taking at Unknown time Self Yes No   Sig: Take 10 mg by mouth daily   gemfibrozil (LOPID) 600 mg tablet 9/18/2019 at Unknown time Self No Yes   Sig: TAKE 1 TABLET BY MOUTH DAILY   lactulose 20 g/30 mL  Self No No   Sig: Take 15 mL (10 g total) by mouth 3 (three) times a day for 22 days   mupirocin (BACTROBAN) 2 % ointment 9/19/2019 at Unknown time Self No Yes   Sig: Apply topically 3 (three) times a day   omeprazole (PriLOSEC) 20 mg delayed release capsule 9/19/2019 at Unknown time Self Yes Yes   Sig: Take 20 mg by mouth daily   ondansetron (ZOFRAN) 8 mg tablet 9/19/2019 at Unknown time Self No Yes   Sig: Take 1 tablet (8 mg total) by mouth every 8 (eight) hours as needed for nausea or vomiting   oxybutynin (DITROPAN-XL) 10 MG 24 hr tablet 9/19/2019 at Unknown time Self No Yes   Sig: TAKE 1 TABLET BY MOUTH EVERY DAY   quinapril (ACCUPRIL) 5 mg tablet 9/19/2019 at Unknown time Self No Yes   Sig: TAKE 1 TABLET BY MOUTH DAILY AT BEDTIME   rivaroxaban (XARELTO) 20 mg tablet 9/19/2019 at Unknown time Self No Yes   Sig: Take 1 tablet (20 mg total) by mouth daily with breakfast   sodium chloride, PF, 0 9 %  Self No No   Sig: 10 mL by Intracatheter route daily for 30 days   venlafaxine (EFFEXOR) 75 mg tablet 9/19/2019 at Unknown time Self Yes Yes   Sig: Take 75 mg by mouth 3 (three) times a day        Facility-Administered Medications: None       Past Medical History:   Diagnosis Date    Anemia     Chemotherapy follow-up examination     Depression     Diabetes mellitus ([facility] [address] )     Endometrial cancer ([facility] [address] ) 12/2017    Hyperglycemia     vx type 2 dm -- last assessed 4/1/14; resolved 11/7/17    Hyperlipidemia     Hypertension     Obesity     last assessed 10/14/17; resolved 11/7/17       Past Surgical History:   Procedure Laterality Date    ABDOMINAL SURGERY      GASTRIC BYPASS    CHOLECYSTECTOMY      at the time of gastric bypass    COLONOSCOPY      CT NEEDLE BIOPSY LYMPH NODE  7/8/2019    GASTRIC BYPASS      HYSTERECTOMY Bilateral     total abdominal with salpingo-oophorectomy    IR PORT PLACEMENT  7/26/2019    IR PORT REMOVAL  9/20/2019    IR TUBE PLACEMENT NEPHROSTOMY  7/26/2019    OOPHORECTOMY Bilateral     LA LAP, RADICAL HYST W/ TUBE&OV, NODE BX N/A 12/19/2017    Procedure: HYSTERECTOMY LAPAROSCOPIC TOTAL ([address]) W/ ROBOTICS; BILATERAL SALPINGOOPHERECTOMY; LYMPH NODE DISSECTION; lysis of adhesions;  Surgeon: Patricio Benavides MD;  [facility];  Service: Gynecology Oncology    ND LAP,DIAGNOSTIC ABDOMEN N/A 12/19/2017    Procedure: LAPAROSCOPY DIAGNOSTIC;  Surgeon: Patricio Benavides MD;  [facility];  Service: Gynecology Oncology    TONSILLECTOMY      US GUIDED BREAST BIOPSY RIGHT COMPLETE Right 6/28/2019       Family History   Problem Relation Age of Onset    Other Mother         dyslipidemia    Ovarian cancer Mother 48    Lymphoma Father     Bone cancer Maternal Grandfather     No Known Problems Sister     No Known Problems Maternal Grandmother     No Known Problems Paternal Grandmother     No Known Problems Paternal Grandfather     No Known Problems Maternal Aunt     No Known Problems Maternal Aunt     No Known Problems Maternal Aunt     No Known Problems Maternal Aunt     No Known Problems Paternal Aunt     No Known Problems Paternal Aunt     No Known Problems Paternal Aunt     No Known Problems Paternal Aunt     No Known Problems Brother     No Known Problems Brother      I have reviewed and agree with the history as documented  Social History     Tobacco Use    Smoking status: Never Smoker    Smokeless tobacco: Never Used   Substance Use Topics    Alcohol use: Not Currently    Drug use: No        Review of Systems   Constitutional: Positive for fever  Negative for activity change, appetite change, chills and fatigue  HENT: Negative for ear pain, rhinorrhea and tinnitus  Eyes: Negative for pain and visual disturbance  Respiratory: Negative for apnea, cough, chest tightness, shortness of breath and wheezing  Cardiovascular: Negative for chest pain, palpitations and leg swelling  Gastrointestinal: Positive for diarrhea  Negative for abdominal pain, nausea and vomiting  Genitourinary: Negative for dysuria, flank pain and pelvic pain  Musculoskeletal: Negative for arthralgias and myalgias     Skin: Negative for rash and wound    Neurological: Positive for weakness  Negative for dizziness, syncope, numbness and headaches  All other systems reviewed and are negative  Physical Exam  ED Triage Vitals [09/19/19 1626]   Temperature Pulse Respirations Blood Pressure SpO2   (!) 101 °F (38 3 °C) 105 20 (!) 88/46 95 %      Temp Source Heart Rate Source Patient Position - Orthostatic VS BP Location FiO2 (%)   Oral Monitor Sitting Left arm --      Pain Score       2             Orthostatic Vital Signs  Vitals:    09/24/19 1525 09/24/19 2244 09/24/19 2322 09/25/19 0739   BP: 124/79 124/78  146/87   Pulse: (!) 106 (!) 109 90 85   Patient Position - Orthostatic VS:           Physical Exam   Constitutional: She is oriented to person, place, and time  She appears well-developed  No distress  HENT:   Head: Normocephalic and atraumatic  Eyes: Conjunctivae and EOM are normal  Right eye exhibits no discharge  Left eye exhibits no discharge  No scleral icterus  Neck: Normal range of motion  Neck supple  No JVD present  No tracheal deviation present  Cardiovascular: Normal rate, regular rhythm, normal heart sounds and intact distal pulses  Exam reveals no gallop and no friction rub  No murmur heard  Pulmonary/Chest: Effort normal and breath sounds normal  No stridor  No respiratory distress  She has no wheezes  She has no rales  She exhibits no tenderness  Abdominal: Soft  There is no tenderness  Musculoskeletal: Normal range of motion  She exhibits no edema or deformity  Neurological: She is alert and oriented to person, place, and time  No sensory deficit  She exhibits normal muscle tone  Skin: Skin is warm  Capillary refill takes less than 2 seconds  No rash noted  She is not diaphoretic  No erythema  Psychiatric: She has a normal mood and affect  Her behavior is normal  Judgment and thought content normal    Nursing note and vitals reviewed        ED Medications  Medications   ARIPiprazole (ABILIFY) tablet 10 mg (10 mg Oral Given 9/25/19 0941)   aspirin (ECOTRIN LOW STRENGTH) EC tablet 81 mg (81 mg Oral Given 9/25/19 0939)   cholecalciferol (VITAMIN D3) tablet 1,000 Units (1,000 Units Oral Given 9/25/19 0940)   mupirocin (BACTROBAN) 2 % ointment ( Topical Not Given 9/25/19 0945)   oxybutynin (DITROPAN-XL) 24 hr tablet 10 mg (10 mg Oral Given 9/25/19 0939)   sodium chloride (PF) 0 9 % injection 10 mL (10 mL Intracatheter Given 9/25/19 0940)   venlafaxine (EFFEXOR) tablet 75 mg (75 mg Oral Given 9/25/19 0940)   acetaminophen (TYLENOL) tablet 650 mg (650 mg Oral Given 9/21/19 0312)   potassium chloride 20 mEq IVPB (premix) (20 mEq Intravenous Not Given 9/20/19 1800)   benzonatate (TESSALON PERLES) capsule 200 mg (200 mg Oral Given 9/21/19 2033)   ceFAZolin (ANCEF) IVPB (premix) 2,000 mg (2,000 mg Intravenous New Bag 9/25/19 0554)   enoxaparin (LOVENOX) subcutaneous injection 90 mg (0 mg Subcutaneous Hold 9/25/19 0604)   pantoprazole (PROTONIX) injection 40 mg (40 mg Intravenous Given 9/25/19 0940)   polyethylene glycol (MIRALAX) packet 17 g (17 g Oral Not Given 9/25/19 0843)   potassium chloride (K-DUR,KLOR-CON) CR tablet 40 mEq (40 mEq Oral Given 9/25/19 0940)   LORazepam (ATIVAN) tablet 1 mg (1 mg Oral Given 9/24/19 2254)   sodium chloride 0 9 % bolus 1,000 mL (0 mL Intravenous Stopped 9/19/19 1841)     Followed by   sodium chloride 0 9 % bolus 1,000 mL (0 mL Intravenous Stopped 9/19/19 1901)     Followed by   sodium chloride 0 9 % bolus 1,000 mL (0 mL Intravenous Stopped 9/19/19 1949)   cefepime (MAXIPIME) 2 g/50 mL dextrose IVPB (0 mg Intravenous Stopped 9/19/19 1859)   vancomycin (VANCOCIN) 1,250 mg in sodium chloride 0 9 % 250 mL IVPB (0 mg/kg × 91 2 kg Intravenous Stopped 9/19/19 2102)   potassium chloride (K-DUR,KLOR-CON) CR tablet 40 mEq (40 mEq Oral Given 9/19/19 1932)   furosemide (LASIX) injection 20 mg (20 mg Intravenous Given 9/19/19 2218)   potassium chloride 40 mEq IVPB (premix) (0 mEq Intravenous Stopped 9/20/19 1356) vancomycin (VANCOCIN) IVPB (premix) 1,000 mg (1,000 mg Intravenous New Bag 9/20/19 2153)   cefepime (MAXIPIME) 2,000 mg in dextrose 5 % 50 mL IVPB (0 mg Intravenous Stopped 9/20/19 1930)   potassium chloride 20 mEq IVPB (premix) (0 mEq Intravenous Stopped 9/20/19 2257)   potassium chloride (K-DUR,KLOR-CON) CR tablet 40 mEq (20 mEq Oral Given 9/21/19 1025)   potassium chloride 10 % oral solution 20 mEq (20 mEq Oral Given 9/21/19 1152)   iohexol (OMNIPAQUE) 350 MG/ML injection (MULTI-DOSE) 100 mL (100 mL Intravenous Given 9/22/19 1131)   potassium chloride (K-DUR,KLOR-CON) CR tablet 40 mEq (40 mEq Oral Given 9/23/19 0928)   LORazepam (ATIVAN) tablet 1 mg (1 mg Oral Given 9/24/19 0535)       Diagnostic Studies  Results Reviewed     Procedure Component Value Units Date/Time    Urine culture [[id number]]  (Abnormal)  (Susceptibility) Collected:  09/19/19 1731    Lab Status:  Final result Specimen:  Urine, Other Updated:  09/22/19 2120     Urine Culture 60,000-69,000 cfu/ml Coagulase negative Staphylococcus species      60,000-69,000 cfu/ml Corynebacterium jeikeium    Susceptibility     Coagulase negative Staphylococcus species (1)     Antibiotic Interpretation Microscan Method Status    ZID Performed  Yes  JAMES Final    Ampicillin ($$) Resistant <=2 00 ug/ml JAMES Final    Cefazolin ($) Susceptible <=4 00 ug/ml JAMES Final    Gentamicin ($$) Susceptible <=1 ug/ml JAMES Final    Nitrofurantoin Susceptible <=32 ug/ml JAMES Final    Oxacillin Susceptible <=0 25 ug/ml JAMES Final    Tetracycline Susceptible <=2 ug/ml JAMES Final    Trimethoprim + Sulfamethoxazole ($$$) Susceptible 2/38 ug/ml JAMES Final    Vancomycin ($) Susceptible 2 00 ug/ml JAMES Final                   Blood culture #1 [[id number]]  (Abnormal)  (Susceptibility) Collected:  09/19/19 1711    Lab Status:  Final result Specimen:  Blood from Line, Venous Updated:  09/22/19 1123     Blood Culture Staphylococcus aureus     Gram Stain Result Gram positive cocci in clusters Susceptibility     Staphylococcus aureus (1)     Antibiotic Interpretation Microscan Method Status    Ampicillin ($$) Resistant 8 00 ug/ml JAMES Final     This is an appended report  These results have been appended to a previously preliminary verified report  Cefazolin ($) Susceptible <=4 00 ug/ml JAMES Final     This is an appended report  These results have been appended to a previously preliminary verified report  Clindamycin ($) Susceptible 0 50 ug/ml JAMES Final     This is an appended report  These results have been appended to a previously preliminary verified report  Erythromycin ($$$$) Susceptible 0 50 ug/ml JAMES Final     This is an appended report  These results have been appended to a previously preliminary verified report  Gentamicin ($$) Susceptible <=1 ug/ml JAMES Final     This is an appended report  These results have been appended to a previously preliminary verified report  Oxacillin Susceptible <=0 25 ug/ml JAMES Final     This is an appended report  These results have been appended to a previously preliminary verified report  Tetracycline Susceptible <=2 ug/ml JAMES Final     This is an appended report  These results have been appended to a previously preliminary verified report  Trimethoprim + Sulfamethoxazole ($$$) Susceptible <=0 5/9 5 ug/ml JAMES Final     This is an appended report  These results have been appended to a previously preliminary verified report  Vancomycin ($) Susceptible 1 00 ug/ml JAMES Final     This is an appended report  These results have been appended to a previously preliminary verified report                     CBC and differential [[id number]]  (Abnormal) Collected:  09/19/19 1711    Lab Status:  Final result Specimen:  Blood from Line, Venous Updated:  09/19/19 1854     WBC 16 15 Thousand/uL      RBC 2 67 Million/uL      Hemoglobin 7 5 g/dL      Hematocrit 22 4 %      MCV 84 fL      MCH 28 1 pg      MCHC 33 5 g/dL      RDW 16 2 %      MPV 10 9 fL Platelets 861 Thousands/uL      nRBC 0 /100 WBCs     Narrative: This is an appended report  These results have been appended to a previously verified report      Urine Microscopic [[id number]]  (Abnormal) Collected:  09/19/19 1731    Lab Status:  Final result Specimen:  Urine, Other Updated:  09/19/19 1828     RBC, UA 20-30 /hpf      WBC, UA 10-20 /hpf      Epithelial Cells Occasional /hpf      Bacteria, UA Moderate /hpf     Procalcitonin [[id number]]  (Abnormal) Collected:  09/19/19 1711    Lab Status:  Final result Specimen:  Blood from Arm, Right Updated:  09/19/19 1758     Procalcitonin 4 39 ng/ml     Protime-INR [[id number]]  (Abnormal) Collected:  09/19/19 1711    Lab Status:  Final result Specimen:  Blood from Arm, Right Updated:  09/19/19 1753     Protime 32 9 seconds      INR 3 29    APTT [[id number]]  (Abnormal) Collected:  09/19/19 1711    Lab Status:  Final result Specimen:  Blood from Arm, Right Updated:  09/19/19 1753     PTT 69 seconds     UA w Reflex to Microscopic w Reflex to Culture [[id number]]  (Abnormal) Collected:  09/19/19 1731    Lab Status:  Final result Specimen:  Urine, Other Updated:  09/19/19 1751     Color, UA Yellow     Clarity, UA Cloudy     Specific Gravity, UA 1 007     pH, UA 7 5     Leukocytes, UA Small     Nitrite, UA Negative     Protein,  (2+) mg/dl      Glucose, UA Negative mg/dl      Ketones, UA Negative mg/dl      Urobilinogen, UA 0 2 E U /dl      Bilirubin, UA Negative     Blood, UA Large    Comprehensive metabolic panel [[id number]]  (Abnormal) Collected:  09/19/19 1711    Lab Status:  Final result Specimen:  Blood from Line, Venous Updated:  09/19/19 1750     Sodium 129 mmol/L      Potassium 2 9 mmol/L      Chloride 97 mmol/L      CO2 18 mmol/L      ANION GAP 14 mmol/L      BUN 37 mg/dL      Creatinine 1 91 mg/dL      Glucose 113 mg/dL      Calcium 8 6 mg/dL      AST 55 U/L      ALT 41 U/L      Alkaline Phosphatase 91 U/L      Total Protein 6 8 g/dL      Albumin 2 9 g/dL      Total Bilirubin 0 27 mg/dL      eGFR 28 ml/min/1 73sq m     Narrative:       [place] guidelines for Chronic Kidney Disease (CKD):     Stage 1 with normal or high GFR (GFR > 90 mL/min/1 73 square meters)    Stage 2 Mild CKD (GFR = 60-89 mL/min/1 73 square meters)    Stage 3A Moderate CKD (GFR = 45-59 mL/min/1 73 square meters)    Stage 3B Moderate CKD (GFR = 30-44 mL/min/1 73 square meters)    Stage 4 Severe CKD (GFR = 15-29 mL/min/1 73 square meters)    Stage 5 End Stage CKD (GFR <15 mL/min/1 73 square meters)  Note: GFR calculation is accurate only with a steady state creatinine    Lactic acid x2 [[id number]]  (Normal) Collected:  09/19/19 1711    Lab Status:  Final result Specimen:  Blood from Line, Venous Updated:  09/19/19 1747     LACTIC ACID 0 7 mmol/L     Narrative:       Result may be elevated if tourniquet was used during collection  CT chest abdomen pelvis w contrast   Final Result by Brandon Au MD (09/23 1045)      1  No definite evidence of metastatic disease in the chest   New scattered bilateral pulmonary nodules measuring up to 4 mm and new nodular opacities in the left lower lobe, nonspecific and likely infectious/inflammatory  However, continued    surveillance as per the patient's oncologic imaging protocol is recommended to document stability/resolution  2   New small right and trace left pleural effusions with associated atelectasis  3   Decreased size of the right pelvic mass with persistent encasement/abutment of the right iliac vessels and probable occlusion of the right external iliac vein  Decreased right iliac chain lymphadenopathy  4   Interval resolution of right hydronephrosis status post placement of a percutaneous nephrostomy tube and ureteral stent        Workstation performed: [id number]         IR port removal   Final Result by Benjamin Diallo MD (09/20 2017)   Fluoroscopically guided removal of IJ/chest port  Purulent port pocket  Plan:   Keep drain in place on suction, expect prompt removal   Patient will need to follow-up with interventional radiology for site check/suture removal in approximately 1 week if she feels well  Dressing changes for the neck wound  Workstation performed: [id number]         XR chest 1 view portable   Final Result by Fan Patel MD (09/20 6243)      Findings of volume overload with pulmonary vascular congestion and mild interstitial edema  No consolidation  Workstation performed: [id number]         IR tube check    (Results Pending)   IR site check    (Results Pending)         Procedures  Procedures  Conscious Sedation Assessment      Classification Score   ASA Scale Assessment  3-Severe systemic disease that results in functional limitation filed at 09/20/2019 1819   Mallampati Classification  Class III: soft palate, base of uvula visible - Moderate Difficulty filed at 09/20/2019 Õie 16            ED Course  ED Course as of Sep 25 1019   Thu Sep 19, 2019   6458 Dr Velazquez contacted, said to admit through [address] and have them consult him      2043 Patient states is coughing more now and feels slightly SOB  Put on nasal cannula                        Initial Sepsis Screening     Row Name 09/19/19 1644                Is the patient's history suggestive of a new or worsening infection? (!) Yes (Proceed)  -CORNELIA        Suspected source of infection  suspect infection, source unknown  -CORNELIA        Are two or more of the following signs & symptoms of infection both present and new to the patient? (!) Yes (Proceed)  -CORNELIA        Indicate SIRS criteria  Hyperthemia > 38 3C (100 9F); Tachycardia > 90 bpm  -CORNELIA        If the answer is yes to both questions, suspicion of sepsis is present          If severe sepsis is present AND tissue hypoperfusion perists in the hour after fluid resuscitation or lactate > 4, the patient meets criteria for SEPTIC SHOCK   Are any of the following organ dysfunction criteria present within 6 hours of suspected infection and SIRS criteria that are NOT considered to be chronic conditions? (!) Yes  -CORNELIA        Organ dysfunction          Date of presentation of severe sepsis          Time of presentation of severe sepsis          Tissue hypoperfusion persists in the hour after crystalloid fluid administration, evidenced, by either:          Was hypotension present within one hour of the conclusion of crystalloid fluid administration?         Date of presentation of septic shock          Time of presentation of septic shock            User Key  (r) = Recorded By, (t) = Taken By, (c) = Cosigned By    Initials Name Provider Type    Eliza Osuna DO Physician           Default Flowsheet Data (last 720 hours)      Sepsis Reassess     Row Name 09/19/19 9721                   Repeat Volume Status and Tissue Perfusion Assessment Performed    Repeat Volume Status and Tissue Perfusion Assessment Performed  Yes  -SUSAN           Volume Status and Tissue Perfusion Post Fluid Resuscitation * Must Document All *    Vital Signs Reviewed (HR, RR, BP, T)  Yes  -SUSAN        Shock Index Reviewed  Yes  -SUSAN        Arterial Oxygen Saturation Reviewed (POx, SaO2 or SpO2)  Yes (comment %)  -SUSAN        Cardio  (!) Regular rate and rhythm;Normal S1/S2; Tachycardia  -SUSAN        Pulmonary  Normal effort  -SUSAN        Capillary Refill  Brisk  -SUSAN        Peripheral Pulses  Radial;Dorsalis Pedis; Posterior Tibialis  -SUSAN        Peripheral Pulse          Dorsalis Pedis          Posterior Tibialis          Skin  Warm  -SUSAN        Urine output assessed  Adequate  -SUSAN           *OR*   Intensive Monitoring- Must Document One of the Following Four *:    Vital Signs Reviewed          * Central Venous Pressure (CVP or RAP)          * Central Venous Oxygen (SVO2, ScvO2 or Oxygen saturation via central catheter)          * Bedside Cardiovascular US in IVC diameter and % collapse          * Passive Leg Raise OR Crystalloid Challenge            User Key  (r) = Recorded By, (t) = Taken By, (c) = Cosigned By    Initials Name Provider Type    [id number] [facility],  Resident                MDM  Number of Diagnoses or Management Options  Fever:   Sepsis, due to unspecified organism [facility]):   Diagnosis management comments: Sepsis workup  Patient's ob onc doctor contacted, said to admit patient and then he will be consulted      Disposition  Final diagnoses:   Fever   Sepsis, due to unspecified organism [facility])     Time reflects when diagnosis was documented in both MDM as applicable and the Disposition within this note     Time User Action Codes Description Comment    9/19/2019  9:40 PM Wicho Bibles [R50 9] Fever     9/19/2019  9:40 PM Melvin Goldberg Add [A41 9] Sepsis, due to unspecified organism     9/20/2019  9:07 AM Dumaswala, Zo B Add [C54 1] Endometrial cancer ([facility] [address] )     9/20/2019  9:07 AM Dumaswala, Zo B Modify [C54 1] Endometrial cancer ([facility] [address] )     9/20/2019  9:07 AM Dumaswala, Zo B Add [R78 81] Bacteremia     9/22/2019 10:07 AM Dumaswala, Zo B Add [N13 39] Other hydronephrosis     9/23/2019 12:56 PM Dumaswala, Zo B Add [I82 4Y1] Acute deep vein thrombosis (DVT) of proximal vein of right lower [facility]     9/23/2019  2:38 PM PosivakZee Add [B99 9] Infection     9/24/2019 12:25 PM Vidal Suggs Add [R13 10] Dysphagia, unspecified type     9/24/2019  3:13 PM Shahrzad Sifuentes Add [I38] Endocarditis       ED Disposition     ED Disposition Condition Date/Time Comment    Admit Stable Thu Sep 19, 2019  9:40 PM Case was discussed with Dr Yefri Causey and the patient's admission status was agreed to be Admission Status: inpatient status to the service of Dr Yefri Causey           Follow-up Information     Follow up With Specialties Details Why Contact Info    Shadi Begum MD Urology Go on 10/30/2019 October 30th at 8:00 a m  [address] [place] [address] [address]            Current Discharge Medication List      START taking these medications    Details   enoxaparin (LOVENOX) 100 mg/mL Inject 0 9 mL (90 mg total) under the skin every 12 (twelve) hours  Qty: 54 mL, Refills: 0    Associated Diagnoses: Endometrial cancer ([address] );  Acute deep vein thrombosis (DVT) of proximal vein of right lower extremity (HCC)         CONTINUE these medications which have NOT CHANGED    Details   ARIPiprazole (ABILIFY) 10 mg tablet Take 10 mg by mouth daily      aspirin (ECOTRIN LOW STRENGTH) 81 mg EC tablet Take 81 mg by mouth daily      cholecalciferol (VITAMIN D3) 1,000 units tablet Take 1,000 Units by mouth daily      Ferrous Sulfate (IRON) 28 MG TABS Take by mouth      gemfibrozil (LOPID) 600 mg tablet TAKE 1 TABLET BY MOUTH DAILY  Qty: 90 tablet, Refills: 1    Associated Diagnoses: Dyslipidemia      LORazepam (ATIVAN) 1 mg tablet Take 1 tablet (1 mg total) by mouth every 6 (six) hours as needed for anxiety (or nausea)  Qty: 36 tablet, Refills: 1    Associated Diagnoses: Chemotherapy-induced nausea      mupirocin (BACTROBAN) 2 % ointment Apply topically 3 (three) times a day  Qty: 22 g, Refills: 0    Associated Diagnoses: Ulceration of rectal mucous membrane      omeprazole (PriLOSEC) 20 mg delayed release capsule Take 20 mg by mouth daily      ondansetron (ZOFRAN) 8 mg tablet Take 1 tablet (8 mg total) by mouth every 8 (eight) hours as needed for nausea or vomiting  Qty: 30 tablet, Refills: 1    Associated Diagnoses: Chemotherapy-induced nausea      oxybutynin (DITROPAN-XL) 10 MG 24 hr tablet TAKE 1 TABLET BY MOUTH EVERY DAY  Qty: 30 tablet, Refills: 11    Associated Diagnoses: Urinary frequency      quinapril (ACCUPRIL) 5 mg tablet TAKE 1 TABLET BY MOUTH DAILY AT BEDTIME  Qty: 90 tablet, Refills: 1    Associated Diagnoses: Benign essential hypertension      rivaroxaban (XARELTO) 20 mg tablet Take 1 tablet (20 mg total) by mouth daily with breakfast  Qty: 30 tablet, Refills: 4    Associated Diagnoses: Swollen leg      venlafaxine (EFFEXOR) 75 mg tablet Take 75 mg by mouth 3 (three) times a day        CRANIAL PROSTHESIS, RX, Please provide patient with extracranial prosthesis while undergoing active chemotherapy treatment  Qty: 1 Piece, Refills: 0    Associated Diagnoses: Endometrial cancer ([address] [address] )      dexmethylphenidate (FOCALIN XR) 10 MG 24 hr capsule Take 10 mg by mouth daily      lactulose 20 g/30 mL Take 15 mL (10 g total) by mouth 3 (three) times a day for 22 days  Qty: 946 mL, Refills: 0    Associated Diagnoses: Therapeutic opioid induced constipation      MYRBETRIQ 50 MG TB24 TAKE 1 TABLET DAILY  Qty: 30 tablet, Refills: 10    Associated Diagnoses: Urinary urgency      sodium chloride, PF, 0 9 % 10 mL by Intracatheter route daily for 30 days  Qty: 300 mL, Refills: 3    Associated Diagnoses: Endometrial cancer ([facility] )           No discharge procedures on file  ED Provider  Attending physically available and evaluated Mellody Degree  I managed the patient along with the ED Attending      Electronically Signed by         Timo Dooley DO  09/25/19 1011

## 2019-09-19 NOTE — CONSULTS
Consultation - Gynecology Oncology  Mark Feng 60 y o  female MRN: [id number]  Unit/Bed#: ED 22 Encounter: [id number]      Consults      Chief Complaint   Patient presents with    Fever - 9 weeks to 74 years     pt had chemo on 9/6/19, pt is weak, sob, coughing, and has fever       History of Present Illness   Physician Requesting Consult: Fawad Cantrell DO  Reason for Consult / Principal Problem: Fever  Subspeciality: GYN ONC  HPI: Mrak Feng is a 60y o  year old female with recurrernt stage 1B1 endometrial cancer diagnosed in 2017, now with biopsy proven evidence of right pelvic sidewall recurrence presenting to ED with complaint of fever, weakness, SOB, coughing  Pt has been receiving chemotherapy with Taxol 175mg/m2 and Carboplatin AUC 6 every 3 weeks  She received one dose of Granix on 8/31/19  for chemotherapy-induced neutropenia  Neulasta OnPro was added to her treatments that started on 9/9/19  Last infusion treatment was Monday, 9/16/19  Pt called office today c/o SOB with activity, which was new  She was scheduled for CT on Saturday, but was offered to have it performed STAT today, however, declined  She was instructed to call back with any worsening symptoms and around 1430, patient's sister called reporting she had a temperature of 101 8F  She was instructed to go to ED for further evaluation  Pt states she has not been feeling well since Monday  She had episodes of diarrhea on Tuesday and Wednesday  She states she received IV hydration on Wednesday and felt somewhat better, but still weak  She then felt a subjective fever with chills today and had SOB  She also reports a non-productive cough that started today as well  At this time, patient is having difficulty speaking due to increased effort to catch her breath, despite being on supplemental oxygen via [place]  Review of vitals in the room demonstrate tachypnea in the 40s, HR 110s  Of note, patient has a nephrostomy tube in place   ROS as noted below:    Review of Systems   Constitutional: Positive for chills, fatigue and fever  Respiratory: Positive for cough (non-productive) and shortness of breath  Cardiovascular: Positive for palpitations  Negative for chest pain and leg swelling  Gastrointestinal: Positive for diarrhea and nausea  Negative for abdominal pain, constipation and vomiting  Genitourinary: Negative for pelvic pain, vaginal bleeding and vaginal discharge  Skin: Positive for pallor  Neurological: Positive for weakness         Historical Information   Past Medical History:   Diagnosis Date    Anemia     Chemotherapy follow-up examination     Depression     Diabetes mellitus ([facility] )     Endometrial cancer ([facility] ) 2017    Hyperglycemia     vx type 2 dm -- last assessed 14; resolved 17    Hyperlipidemia     Hypertension     Obesity     last assessed 10/14/17; resolved 17     Past Surgical History:   Procedure Laterality Date    ABDOMINAL SURGERY      GASTRIC BYPASS    CHOLECYSTECTOMY      at the time of gastric bypass    COLONOSCOPY      CT NEEDLE BIOPSY LYMPH NODE  2019    GASTRIC BYPASS      HYSTERECTOMY Bilateral     total abdominal with salpingo-oophorectomy    IR PORT PLACEMENT  2019    IR TUBE PLACEMENT NEPHROSTOMY  2019    OOPHORECTOMY Bilateral     NC LAP, RADICAL HYST W/ TUBE&OV, NODE BX N/A 2017    Procedure: HYSTERECTOMY LAPAROSCOPIC TOTAL ([address] [address]) W/ ROBOTICS; BILATERAL SALPINGOOPHERECTOMY; LYMPH NODE DISSECTION; lysis of adhesions;  Surgeon: George Blankenship MD;  [facility]: [facility];  Service: Gynecology Oncology    NC LAP,DIAGNOSTIC ABDOMEN N/A 2017    Procedure: LAPAROSCOPY DIAGNOSTIC;  Surgeon: George Blankenship MD;  [facility];  Service: Gynecology Oncology    TONSILLECTOMY      US GUIDED BREAST BIOPSY RIGHT COMPLETE Right 2019     OB History    Para Term  AB Living       0         SAB TAB Ectopic Multiple Live Births Family History   Problem Relation Age of Onset    Other Mother         dyslipidemia    Ovarian cancer Mother 48    Lymphoma Father     Bone cancer Maternal Grandfather     No Known Problems Sister     No Known Problems Maternal Grandmother     No Known Problems Paternal Grandmother     No Known Problems Paternal Grandfather     No Known Problems Maternal Aunt     No Known Problems Maternal Aunt     No Known Problems Maternal Aunt     No Known Problems Maternal Aunt     No Known Problems Paternal Aunt     No Known Problems Paternal Aunt     No Known Problems Paternal Aunt     No Known Problems Paternal Aunt     No Known Problems Brother     No Known Problems Brother      Social History   Social History     Substance and Sexual Activity   Alcohol Use Not Currently     Social History     Substance and Sexual Activity   Drug Use No     Social History     Tobacco Use   Smoking Status Never Smoker   Smokeless Tobacco Never Used       Meds/Allergies   Current Facility-Administered Medications   Medication Dose Route Frequency    vancomycin (VANCOCIN) 1,250 mg in sodium chloride 0 9 % 250 mL IVPB  15 mg/kg Intravenous Once         Allergies   Allergen Reactions    Cephalosporins Other (See Comments)     RASH - BILATERAL ARMS PER RN     Previous Therapy as documented in Progress note by RAFITA Michael on 9/6/19:    Endometrial cancer ([facility] [address] )     11/17/2017 Initial Diagnosis       Endometrial cancer [facility])        12/19/2017 Surgery       Robotic assisted total laparoscopic hysterectomy with bilateral salpingo-oophorectomy and sentinel bilateral pelvic lymph node dissection   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 (4 4 x 3 2 cm tumor, 9 4/15 4mm invasion, NO LVSI, washings revealed atypical cellular changes)        12/19/2017 Genetic Testing       Morrison testing negative        7/8/2019 Recurrence       Presented with right lower extremity DVT and CT demonstrating right pelvic sidewall mass with venous, ureteral and nerve compression causing significant neuropathic pain  Core biopsy demonstrates high-grade carcinoma         7/30/2019 -  Chemotherapy       pegfilgrastim (NEULASTA ONPRO) subcutaneous injection kit 6 mg, 6 mg, Subcutaneous, Once, 0 of 4 cycles  CARBOplatin (PARAPLATIN) 574 2 mg in sodium chloride 0 9 % 250 mL IVPB, 574 2 mg, Intravenous, Once, 2 of 6 cycles  Administration: 574 2 mg (7/30/2019), 553 2 mg (8/19/2019)  PACLitaxel (TAXOL) 337 8 mg in sodium chloride 0 9 % 500 mL chemo IVPB, 175 mg/m2 = 337 8 mg, Intravenous, Once, 2 of 6 cycles  Administration: 337 8 mg (7/30/2019), 327 mg (8/19/2019)       Objective   Vitals: Blood pressure 105/76, pulse 97, temperature (!) 101 °F (38 3 °C), temperature source Oral, resp  rate (!) 37, weight 91 2 kg (201 lb 1 oz), SpO2 92 %, not currently breastfeeding  Body mass index is 40 61 kg/m²  Intake/Output Summary (Last 24 hours) at 9/19/2019 2004  Last data filed at 9/19/2019 1901  Gross per 24 hour   Intake 2050 ml   Output    Net 2050 ml       Invasive Devices     Central Venous Catheter Line            Port A Cath 07/26/19 Right Chest 55 days          Drain            Percutaneous Nephroureteral Tube (PCNU) Right 1 8 5 Fr 26 Cm 55 days                Physical Exam   Constitutional: She is oriented to person, place, and time  She appears ill  No distress  HENT:   Head: Normocephalic and atraumatic  Cardiovascular: Regular rhythm  Tachycardia present  Exam reveals no gallop and no friction rub  No murmur heard  Pulmonary/Chest: Tachypnea noted  She is in respiratory distress  Unable to auscultate  lungs due to patient inability to sit up or turn to side because of increased respiratory effort   Abdominal: Soft  She exhibits no distension  There is no tenderness  There is no rebound and no guarding  Musculoskeletal: Normal range of motion  She exhibits no edema, tenderness or deformity     Neurological: She is alert and oriented to person, place, and time  Psychiatric: She has a normal mood and affect  Her behavior is normal  Judgment and thought content normal    Vitals reviewed        Lab Results:   Recent Results (from the past 24 hour(s))   CBC and differential    Collection Time: 09/19/19  5:11 PM   Result Value Ref Range    WBC 16 15 (H) 4 31 - 10 16 Thousand/uL    RBC 2 67 (L) 3 81 - 5 12 Million/uL    Hemoglobin 7 5 (L) 11 5 - 15 4 g/dL    Hematocrit 22 4 (L) 34 8 - 46 1 %    MCV 84 82 - 98 fL    MCH 28 1 26 8 - 34 3 pg    MCHC 33 5 31 4 - 37 4 g/dL    RDW 16 2 (H) 11 6 - 15 1 %    MPV 10 9 8 9 - 12 7 fL    Platelets 450 (L) 552 - 390 Thousands/uL    nRBC 0 /100 WBCs   Comprehensive metabolic panel    Collection Time: 09/19/19  5:11 PM   Result Value Ref Range    Sodium 129 (L) 136 - 145 mmol/L    Potassium 2 9 (L) 3 5 - 5 3 mmol/L    Chloride 97 (L) 100 - 108 mmol/L    CO2 18 (L) 21 - 32 mmol/L    ANION GAP 14 (H) 4 - 13 mmol/L    BUN 37 (H) 5 - 25 mg/dL    Creatinine 1 91 (H) 0 60 - 1 30 mg/dL    Glucose 113 65 - 140 mg/dL    Calcium 8 6 8 3 - 10 1 mg/dL    AST 55 (H) 5 - 45 U/L    ALT 41 12 - 78 U/L    Alkaline Phosphatase 91 46 - 116 U/L    Total Protein 6 8 6 4 - 8 2 g/dL    Albumin 2 9 (L) 3 5 - 5 0 g/dL    Total Bilirubin 0 27 0 20 - 1 00 mg/dL    eGFR 28 ml/min/1 73sq m   Lactic acid x2    Collection Time: 09/19/19  5:11 PM   Result Value Ref Range    LACTIC ACID 0 7 0 5 - 2 0 mmol/L   Procalcitonin    Collection Time: 09/19/19  5:11 PM   Result Value Ref Range    Procalcitonin 4 39 (H) <=0 25 ng/ml   Protime-INR    Collection Time: 09/19/19  5:11 PM   Result Value Ref Range    Protime 32 9 (H) 11 6 - 14 5 seconds    INR 3 29 (H) 0 84 - 1 19   APTT    Collection Time: 09/19/19  5:11 PM   Result Value Ref Range    PTT 69 (H) 23 - 37 seconds   Manual Differential(PHLEBS Do Not Order)    Collection Time: 09/19/19  5:11 PM   Result Value Ref Range    Segmented % 60 43 - 75 %    Bands % 25 (H) 0 - 8 %    Lymphocytes % 4 (L) 14 - 44 %    Monocytes % 5 4 - 12 %    Eosinophils, % 0 0 - 6 %    Basophils % 0 0 - 1 %    Metamyelocytes% 3 (H) 0 - 1 %    Myelocytes % 2 (H) 0 - 1 %    Atypical Lymphocytes % 1 (H) <=0 %    Absolute Neutrophils 13 73 (H) 1 85 - 7 62 Thousand/uL    Lymphocytes Absolute 0 65 0 60 - 4 47 Thousand/uL    Monocytes Absolute 0 81 0 00 - 1 22 Thousand/uL    Eosinophils Absolute 0 00 0 00 - 0 40 Thousand/uL    Basophils Absolute 0 00 0 00 - 0 10 Thousand/uL    Total Counted      Toxic Granulation Present     RBC Morphology Present     Robb Cells Present     Ovalocytes Present     Poikilocytes Present     Polychromasia Present     Platelet Estimate Decreased (A) Adequate   UA w Reflex to Microscopic w Reflex to Culture    Collection Time: 09/19/19  5:31 PM   Result Value Ref Range    Color, UA Yellow     Clarity, UA Cloudy     Specific [place], UA 1 007 1 003 - 1 030    pH, UA 7 5 4 5, 5 0, 5 5, 6 0, 6 5, 7 0, 7 5, 8 0    Leukocytes, UA Small (A) Negative    Nitrite, UA Negative Negative    Protein,  (2+) (A) Negative mg/dl    Glucose, UA Negative Negative mg/dl    Ketones, UA Negative Negative mg/dl    Urobilinogen, UA 0 2 0 2, 1 0 E U /dl E U /dl    Bilirubin, UA Negative Negative    Blood, UA Large (A) Negative   Urine Microscopic    Collection Time: 09/19/19  5:31 PM   Result Value Ref Range    RBC, UA 20-30 (A) None Seen, 0-5 /hpf    WBC, UA 10-20 (A) None Seen, 0-5, 5-55, 5-65 /hpf    Epithelial Cells Occasional None Seen, Occasional /hpf    Bacteria, UA Moderate (A) None Seen, Occasional /hpf       Imaging:    F/u CXR  Imaging Studies: I have personally reviewed pertinent reports  EKG, Pathology, and Other Studies: I have personally reviewed pertinent reports        Assessment/Plan     Assessment:  63yo with recurrent Stage IBI endometrial cancer on chemotherapy that presented with fever meeting sepsis criteria    Plan:  1) Sepsis  Tmax 101 F at 1626, hypotension and tachypnea in the 30s-40s  Sepsis work-up initiated in ED: blood cultures x2 in process, CBC notable for leucocytosis of 16 15, chemo-therapy induced anemia and thrombocytopenia, Lactic acid normal x2, Procalcitonin elevated at 4 39  U/A: unremarkable, Urine culture in process  F/u CXR  Initiated on IV Cefepime 2g and Vancomycin 1250mg q12hrs  Based on evaluation, patient most-likely will need higher level of care, consider ICU  Will defer management to primary team, SLIM    2) Recurrent stage IBI endometrial cancer   Chemotherapy with Taxol 175mg/m2 and Carboplatin AUC 6 every 3 weeks- will hold at this time  She received one dose of Granix on 8/31/19  for chemotherapy-induced neutropenia  Neulasta OnPro was added to her treatments that started on 9/9/19  Last infusion treatment was 9/16/19  3) [place]  Creatinine 1 91 from 0 60 on 9/13/19  Most-likely secondary to #1  Pt follows with urology for nephrostomy tube   Due for re-evaluation on 10/30/19  Defer management to primary team     Oswaldo Chavez MD  GYN ONC, PGY-3  9/19/2019 10:00 PM

## 2019-09-19 NOTE — TELEPHONE ENCOUNTER
Patient c/o SOB with activity  This is new  There is no chest pain  She has a CT scheduled for Saturday - I offered for this to be done stat today  Patient declines at this time   She will call with any worsening of symptoms, and will report to the ED with any acute CP/SOB, dizziness, etc

## 2019-09-19 NOTE — ED NOTES
In reference to patient in  Room 22  We  Tried  For  Two  Sets  Of  Blood  Cultures but  Only  Got  One  DR Kayla Yo  One   Set  Is  brian Rojas  09/19/19 [id number]

## 2019-09-19 NOTE — TELEPHONE ENCOUNTER
Spoke with patient's sister, Elizabethchase Ellis  Patient has a temperature of 101 8F accompanied by chills  Recommended that patient report to ED for immediate evaluation

## 2019-09-19 NOTE — NURSING NOTE
Spoke with patient this morning  She states her PCNU is draining nicely and she is voiding normally  Denies any complaints or concerns  Instructed her to keep the tube to leg bag and call with any issues  Will schedule for routine exchange in the first week of Oct per Dr Kvng Cantor  Patient verbalized understanding  Schedulers contacted via email to change appt

## 2019-09-20 ENCOUNTER — APPOINTMENT (INPATIENT)
Dept: RADIOLOGY | Facility: HOSPITAL | Age: 64
DRG: 314 | End: 2019-09-20
Attending: HOSPITALIST
Payer: COMMERCIAL

## 2019-09-20 PROBLEM — R78.81 BACTEREMIA: Status: ACTIVE | Noted: 2019-09-20

## 2019-09-20 PROBLEM — J96.00 ACUTE RESPIRATORY FAILURE (HCC): Status: ACTIVE | Noted: 2019-09-20

## 2019-09-20 LAB
ANION GAP SERPL CALCULATED.3IONS-SCNC: 11 MMOL/L (ref 4–13)
ANION GAP SERPL CALCULATED.3IONS-SCNC: 12 MMOL/L (ref 4–13)
BUN SERPL-MCNC: 33 MG/DL (ref 5–25)
BUN SERPL-MCNC: 34 MG/DL (ref 5–25)
CALCIUM SERPL-MCNC: 8.1 MG/DL (ref 8.3–10.1)
CALCIUM SERPL-MCNC: 8.1 MG/DL (ref 8.3–10.1)
CHLORIDE SERPL-SCNC: 103 MMOL/L (ref 100–108)
CHLORIDE SERPL-SCNC: 103 MMOL/L (ref 100–108)
CO2 SERPL-SCNC: 17 MMOL/L (ref 21–32)
CO2 SERPL-SCNC: 18 MMOL/L (ref 21–32)
CREAT SERPL-MCNC: 1.6 MG/DL (ref 0.6–1.3)
CREAT SERPL-MCNC: 1.6 MG/DL (ref 0.6–1.3)
ERYTHROCYTE [DISTWIDTH] IN BLOOD BY AUTOMATED COUNT: 16.4 % (ref 11.6–15.1)
GFR SERPL CREATININE-BSD FRML MDRD: 34 ML/MIN/1.73SQ M
GFR SERPL CREATININE-BSD FRML MDRD: 34 ML/MIN/1.73SQ M
GLUCOSE SERPL-MCNC: 98 MG/DL (ref 65–140)
GLUCOSE SERPL-MCNC: 99 MG/DL (ref 65–140)
HCT VFR BLD AUTO: 28.1 % (ref 34.8–46.1)
HGB BLD-MCNC: 9.2 G/DL (ref 11.5–15.4)
INR PPP: 2.1 (ref 0.84–1.19)
MAGNESIUM SERPL-MCNC: 1.5 MG/DL (ref 1.6–2.6)
MCH RBC QN AUTO: 27.5 PG (ref 26.8–34.3)
MCHC RBC AUTO-ENTMCNC: 32.7 G/DL (ref 31.4–37.4)
MCV RBC AUTO: 84 FL (ref 82–98)
PLATELET # BLD AUTO: 90 THOUSANDS/UL (ref 149–390)
PMV BLD AUTO: 12.3 FL (ref 8.9–12.7)
POTASSIUM SERPL-SCNC: 2.8 MMOL/L (ref 3.5–5.3)
POTASSIUM SERPL-SCNC: 2.8 MMOL/L (ref 3.5–5.3)
PROCALCITONIN SERPL-MCNC: 4.97 NG/ML
PROTHROMBIN TIME: 23.1 SECONDS (ref 11.6–14.5)
RBC # BLD AUTO: 3.35 MILLION/UL (ref 3.81–5.12)
SODIUM SERPL-SCNC: 132 MMOL/L (ref 136–145)
SODIUM SERPL-SCNC: 132 MMOL/L (ref 136–145)
VANCOMYCIN SERPL-MCNC: 9.5 UG/ML
WBC # BLD AUTO: 11.45 THOUSAND/UL (ref 4.31–10.16)

## 2019-09-20 PROCEDURE — 94760 N-INVAS EAR/PLS OXIMETRY 1: CPT

## 2019-09-20 PROCEDURE — 36590 REMOVAL TUNNELED CV CATH: CPT | Performed by: RADIOLOGY

## 2019-09-20 PROCEDURE — 85027 COMPLETE CBC AUTOMATED: CPT | Performed by: INTERNAL MEDICINE

## 2019-09-20 PROCEDURE — 10030 IMG GID FLU COLL DRG SFT TIS: CPT

## 2019-09-20 PROCEDURE — 83735 ASSAY OF MAGNESIUM: CPT | Performed by: INTERNAL MEDICINE

## 2019-09-20 PROCEDURE — 84145 PROCALCITONIN (PCT): CPT | Performed by: INTERNAL MEDICINE

## 2019-09-20 PROCEDURE — 87186 SC STD MICRODIL/AGAR DIL: CPT | Performed by: HOSPITALIST

## 2019-09-20 PROCEDURE — 80048 BASIC METABOLIC PNL TOTAL CA: CPT | Performed by: INTERNAL MEDICINE

## 2019-09-20 PROCEDURE — 36590 REMOVAL TUNNELED CV CATH: CPT

## 2019-09-20 PROCEDURE — 87147 CULTURE TYPE IMMUNOLOGIC: CPT | Performed by: HOSPITALIST

## 2019-09-20 PROCEDURE — 99233 SBSQ HOSP IP/OBS HIGH 50: CPT | Performed by: OBSTETRICS & GYNECOLOGY

## 2019-09-20 PROCEDURE — 0JPT0WZ REMOVAL OF TOTALLY IMPLANTABLE VASCULAR ACCESS DEVICE FROM TRUNK SUBCUTANEOUS TISSUE AND FASCIA, OPEN APPROACH: ICD-10-PCS | Performed by: INTERNAL MEDICINE

## 2019-09-20 PROCEDURE — 80202 ASSAY OF VANCOMYCIN: CPT | Performed by: HOSPITALIST

## 2019-09-20 PROCEDURE — 87070 CULTURE OTHR SPECIMN AEROBIC: CPT | Performed by: HOSPITALIST

## 2019-09-20 PROCEDURE — 87040 BLOOD CULTURE FOR BACTERIA: CPT | Performed by: HOSPITALIST

## 2019-09-20 PROCEDURE — 99254 IP/OBS CNSLTJ NEW/EST MOD 60: CPT | Performed by: INTERNAL MEDICINE

## 2019-09-20 PROCEDURE — C1729 CATH, DRAINAGE: HCPCS

## 2019-09-20 PROCEDURE — 99232 SBSQ HOSP IP/OBS MODERATE 35: CPT | Performed by: HOSPITALIST

## 2019-09-20 PROCEDURE — 85610 PROTHROMBIN TIME: CPT | Performed by: PHYSICIAN ASSISTANT

## 2019-09-20 RX ORDER — POTASSIUM CHLORIDE 20 MEQ/1
40 TABLET, EXTENDED RELEASE ORAL ONCE
Status: DISCONTINUED | OUTPATIENT
Start: 2019-09-20 | End: 2019-09-20

## 2019-09-20 RX ORDER — POTASSIUM CHLORIDE 14.9 MG/ML
20 INJECTION INTRAVENOUS
Status: DISPENSED | OUTPATIENT
Start: 2019-09-20 | End: 2019-09-20

## 2019-09-20 RX ORDER — SODIUM CHLORIDE 9 MG/ML
75 INJECTION, SOLUTION INTRAVENOUS CONTINUOUS
Status: DISCONTINUED | OUTPATIENT
Start: 2019-09-20 | End: 2019-09-20

## 2019-09-20 RX ORDER — BENZONATATE 100 MG/1
200 CAPSULE ORAL 3 TIMES DAILY PRN
Status: DISCONTINUED | OUTPATIENT
Start: 2019-09-20 | End: 2019-09-29 | Stop reason: HOSPADM

## 2019-09-20 RX ORDER — ACETAMINOPHEN 325 MG/1
650 TABLET ORAL EVERY 6 HOURS PRN
Status: DISCONTINUED | OUTPATIENT
Start: 2019-09-20 | End: 2019-09-29 | Stop reason: HOSPADM

## 2019-09-20 RX ORDER — POTASSIUM CHLORIDE 29.8 MG/ML
40 INJECTION INTRAVENOUS ONCE
Status: COMPLETED | OUTPATIENT
Start: 2019-09-20 | End: 2019-09-20

## 2019-09-20 RX ORDER — POTASSIUM CHLORIDE 29.8 MG/ML
40 INJECTION INTRAVENOUS ONCE
Status: DISCONTINUED | OUTPATIENT
Start: 2019-09-20 | End: 2019-09-20

## 2019-09-20 RX ORDER — VANCOMYCIN HYDROCHLORIDE 1 G/200ML
15 INJECTION, SOLUTION INTRAVENOUS ONCE AS NEEDED
Status: COMPLETED | OUTPATIENT
Start: 2019-09-20 | End: 2019-09-20

## 2019-09-20 RX ORDER — POTASSIUM CHLORIDE 14.9 MG/ML
20 INJECTION INTRAVENOUS ONCE
Status: COMPLETED | OUTPATIENT
Start: 2019-09-20 | End: 2019-09-20

## 2019-09-20 RX ORDER — SODIUM CHLORIDE 9 MG/ML
50 INJECTION, SOLUTION INTRAVENOUS CONTINUOUS
Status: DISCONTINUED | OUTPATIENT
Start: 2019-09-20 | End: 2019-09-21

## 2019-09-20 RX ADMIN — VENLAFAXINE 75 MG: 37.5 TABLET ORAL at 20:42

## 2019-09-20 RX ADMIN — POTASSIUM CHLORIDE 20 MEQ: 14.9 INJECTION, SOLUTION INTRAVENOUS at 20:44

## 2019-09-20 RX ADMIN — ASPIRIN 81 MG: 81 TABLET, COATED ORAL at 08:13

## 2019-09-20 RX ADMIN — ACETAMINOPHEN 650 MG: 325 TABLET ORAL at 15:35

## 2019-09-20 RX ADMIN — PANTOPRAZOLE SODIUM 40 MG: 40 TABLET, DELAYED RELEASE ORAL at 05:07

## 2019-09-20 RX ADMIN — OXYBUTYNIN CHLORIDE 10 MG: 5 TABLET, EXTENDED RELEASE ORAL at 08:13

## 2019-09-20 RX ADMIN — RIVAROXABAN 20 MG: 20 TABLET, FILM COATED ORAL at 08:13

## 2019-09-20 RX ADMIN — VITAMIN D, TAB 1000IU (100/BT) 1000 UNITS: 25 TAB at 08:13

## 2019-09-20 RX ADMIN — POTASSIUM CHLORIDE 40 MEQ: 29.8 INJECTION, SOLUTION INTRAVENOUS at 08:22

## 2019-09-20 RX ADMIN — MUPIROCIN: 20 OINTMENT TOPICAL at 21:58

## 2019-09-20 RX ADMIN — BENZONATATE 200 MG: 100 CAPSULE ORAL at 23:12

## 2019-09-20 RX ADMIN — VANCOMYCIN HYDROCHLORIDE 1000 MG: 1 INJECTION, SOLUTION INTRAVENOUS at 21:53

## 2019-09-20 RX ADMIN — SODIUM CHLORIDE 50 ML/HR: 0.9 INJECTION, SOLUTION INTRAVENOUS at 17:41

## 2019-09-20 RX ADMIN — CEFEPIME HYDROCHLORIDE 2000 MG: 2 INJECTION, POWDER, FOR SOLUTION INTRAVENOUS at 18:54

## 2019-09-20 RX ADMIN — ARIPIPRAZOLE 10 MG: 10 TABLET ORAL at 08:14

## 2019-09-20 RX ADMIN — MUPIROCIN: 20 OINTMENT TOPICAL at 15:36

## 2019-09-20 RX ADMIN — POTASSIUM CHLORIDE 20 MEQ: 14.9 INJECTION, SOLUTION INTRAVENOUS at 17:35

## 2019-09-20 RX ADMIN — VENLAFAXINE 75 MG: 37.5 TABLET ORAL at 15:35

## 2019-09-20 RX ADMIN — ACETAMINOPHEN 650 MG: 325 TABLET ORAL at 00:33

## 2019-09-20 RX ADMIN — VENLAFAXINE 75 MG: 37.5 TABLET ORAL at 08:13

## 2019-09-20 RX ADMIN — MUPIROCIN: 20 OINTMENT TOPICAL at 08:14

## 2019-09-20 RX ADMIN — ACETAMINOPHEN 650 MG: 325 TABLET ORAL at 09:35

## 2019-09-20 NOTE — ASSESSMENT & PLAN NOTE
· Appreciate Gynecologic Oncology evaluation, she received Taxol/carboplatin and received last chemotherapy 09/09/2019  · Received granix 08/31/2019, also Neulasta 09/09/2019 and 09/16/2019  · Chemotherapy to be on hold for now given acute infection

## 2019-09-20 NOTE — H&P
H&P- [facility] 1955, 60 y o  female MRN: [id number]    Unit/Bed#: [facility] 912-01 Encounter: [id number]    Primary Care Provider: Ck Herrera DO   Date and time admitted to hospital: 9/19/2019  4:09 PM        * Sepsis [facility])  Assessment & Plan  · Suspected and POA with fevers, leukocytosis, tachycardia  Elevated procalcitonin on admission however with normal lactic acid level  · Possible source includes urinary however also with right nephrostomy tube in place  CXR with evidence of volume overload but no infiltrate visualized via wet read  Does have a Port-A-Cath in place additionally  · Received 3 L NS bolus and currently is tachypneic and remains febrile  · Received cefepime and vancomycin in ED and will continue broad-spectrum antibiotics for now pending results of blood culture (only one drawn in ED prior to antibiotic administration due to poor access) and urine culture  · She did report diarrhea, will check C diff PCR if patient should have recurrent diarrhea  · Repeat procalcitonin in a m  · Trend WBC, temperature curve, hemodynamics    Volume overload  Assessment & Plan  · Patient visibly tachypneic during my evaluation and requiring 3L NC supplemental oxygen  Received 3 L NS as part of sepsis protocol and CXR revealing evidence of pulmonary vascular congestion  No prior echocardiogram is available for review  · Will provide 20mg IV Lasix now; patient is Lasix naive  · Close monitoring of respiratory status, low threshold to initiate BiPAP/escalate level of care if patient should desaturate or remain tachypneic    Acute kidney injury ([facility] [address] )  Assessment & Plan  · POA, baseline approximately 0 6  · Suspect in setting of sepsis, poor p o  Intake  · Received 3 L NS in ED however unfortunately with evidence of volume overload    I will defer further IVF in this setting for now  · Has suprapubic catheter in place  · Will repeat BMP this a m   · Hold quinapril, renally dose medications otherwise    Endometrial cancer [facility])  Assessment & Plan  · Appreciate Gynecologic Oncology evaluation, she received Taxol/carboplatin and received last chemotherapy 09/09/2019  · Received granix 08/31/2019, also Neulasta 09/09/2019 and 09/16/2019  · Chemotherapy to be on hold for now given acute issues    Hyponatremia  Assessment & Plan  · Sodium 129 on admission  · Received 3 L NS bolus in ED, due to shortness of breath with evidence of volume overload 20 mg IV Lasix was given  · Will recheck BMP at midnight    Hypokalemia  Assessment & Plan  · K 2 9 on admission and received 40 mEq oral potassium in ED  · Will recheck potassium level with BMP at midnight    Anemia due to chemotherapy  Assessment & Plan  · Hemoglobin 7 5 on admission  · Will monitor with CBC, consider transfusion for hemoglobin less than 7 or symptomatic  · Given volume overload may need to consider dose of IV Lasix with any transfusion if given    Acute deep vein thrombosis (DVT) of proximal vein of lower extremity (HCC)  Assessment & Plan  · On Eliquis for this    Benign essential hypertension  Assessment & Plan  · Holding quinapril for now given acute kidney injury  · Blood pressure monitoring per protocol      VTE Prophylaxis: Apixaban (Eliquis)  / sequential compression device   Code Status: Level 1 - Full Code as discussed with patient at bedside  POLST: POLST form is not discussed and not completed at this time  Anticipated Length of Stay:  Patient will be admitted on an Inpatient basis with an anticipated length of stay of  greater than 2 midnights  Justification for Hospital Stay: Please see detailed plans noted above  Chief Complaint:     Fevers, weakness  History of Present Illness:  Theresa Pichardo is a 60 y o  female who has a past medical history significant for stage I B1 endometrial cancer diagnosed in 2017 on chemotherapy with Taxol and carboplatin    Also with history of cancer related pain, hyperlipidemia, obesity, hypertension, depression  She presented today with a complaint of fevers and generalized weakness, also with shortness of breath with any activity  She had contacted Gyn/Onc office with these complaints, and was advised to obtain CT chest/abdomen/pelvis, however patient declined this and was instructed call back with any worsening symptoms occurred  This afternoon, patient's sister had called 9 patient was febrile to 101 8 F the patient advised to present to ED for further evaluation  Admitted to diarrhea for the past 2 days, with poor p o  Intake  During ED evaluation patient found to be febrile, tachycardic, and with leukocytosis for which sepsis evaluation was initiated, patient had blood culture drawn, received cefepime/vancomycin, and 3L NS bolus  At the time of my evaluation, however, patient visibly tachypneic and admitting to shortness of breath despite supplemental oxygen  A CXR was obtained with wet revealing evidence of pulmonary vascular congestion  She otherwise complains of persistent fever and weakness      Review of Systems:    Constitutional:  Endorses fever and chills  Eyes:  Denies change in visual acuity   HENT:  Denies nasal congestion or sore throat   Respiratory:  Endorses non-productive cough and shortness of breath  Cardiovascular:  Denies chest pain or edema   GI:  Denies abdominal pain, vomiting, bloody stools but endorses nausea and diarrhea  :  Denies dysuria   Musculoskeletal:  Denies back pain or joint pain   Integument:  Denies rash   Neurologic:  Denies headache, focal weakness or sensory changes but endorses generalized weakness  Endocrine:  Denies polyuria or polydipsia   Lymphatic:  Denies swollen glands   Psychiatric:  Denies depression or anxiety     Past Medical and Surgical History:   Past Medical History:   Diagnosis Date    Anemia     Chemotherapy follow-up examination     Depression     [facility]     [facility] 12/2017    Hyperglycemia     vx type 2 dm -- last assessed 4/1/14; resolved 11/7/17    Hyperlipidemia     Hypertension     Obesity     last assessed 10/14/17; resolved 11/7/17     Past Surgical History:   Procedure Laterality Date    ABDOMINAL SURGERY      GASTRIC BYPASS    CHOLECYSTECTOMY      at the time of gastric bypass    COLONOSCOPY      CT NEEDLE BIOPSY LYMPH NODE  7/8/2019    GASTRIC BYPASS      HYSTERECTOMY Bilateral     total abdominal with salpingo-oophorectomy    IR PORT PLACEMENT  7/26/2019    IR TUBE PLACEMENT NEPHROSTOMY  7/26/2019    OOPHORECTOMY Bilateral     IA LAP, RADICAL HYST W/ TUBE&OV, NODE BX N/A 12/19/2017    Procedure: HYSTERECTOMY LAPAROSCOPIC TOTAL ([address]) W/ ROBOTICS; BILATERAL SALPINGOOPHERECTOMY; LYMPH NODE DISSECTION; lysis of adhesions;  Surgeon: Lakshmi Hammonds MD;  Location: [facility];  Service: Gynecology Oncology    IA LAP,DIAGNOSTIC ABDOMEN N/A 12/19/2017    Procedure: LAPAROSCOPY DIAGNOSTIC;  Surgeon: Lakshmi Hammonds MD;  [facility];  Service: Gynecology Oncology    TONSILLECTOMY      US GUIDED BREAST BIOPSY RIGHT COMPLETE Right 6/28/2019       Meds/Allergies:  Medications Prior to Admission   Medication    ARIPiprazole (ABILIFY) 10 mg tablet    aspirin (ECOTRIN LOW STRENGTH) 81 mg EC tablet    cholecalciferol (VITAMIN D3) 1,000 units tablet    Ferrous Sulfate (IRON) 28 MG TABS    gemfibrozil (LOPID) 600 mg tablet    LORazepam (ATIVAN) 1 mg tablet    mupirocin (BACTROBAN) 2 % ointment    omeprazole (PriLOSEC) 20 mg delayed release capsule    ondansetron (ZOFRAN) 8 mg tablet    oxybutynin (DITROPAN-XL) 10 MG 24 hr tablet    quinapril (ACCUPRIL) 5 mg tablet    rivaroxaban (XARELTO) 20 mg tablet    venlafaxine (EFFEXOR) 75 mg tablet    CRANIAL PROSTHESIS, RX,    dexmethylphenidate (FOCALIN XR) 10 MG 24 hr capsule    lactulose 20 g/30 mL    MYRBETRIQ 50 MG TB24    sodium chloride, PF, 0 9 %       Allergies:    Allergies   Allergen Reactions    Cephalosporins Other (See Comments)     RASH - BILATERAL ARMS PER RN     History:  Marital Status: Single     Substance Use History:   Social History     Substance and Sexual Activity   Alcohol Use Not Currently     Social History     Tobacco Use   Smoking Status Never Smoker   Smokeless Tobacco Never Used     Social History     Substance and Sexual Activity   Drug Use No       Family History:  Family History   Problem Relation Age of Onset    Other Mother         dyslipidemia    Ovarian cancer Mother 48    Lymphoma Father     Bone cancer Maternal Grandfather     No Known Problems Sister     No Known Problems Maternal Grandmother     No Known Problems Paternal Grandmother     No Known Problems Paternal Grandfather     No Known Problems Maternal Aunt     No Known Problems Maternal Aunt     No Known Problems Maternal Aunt     No Known Problems Maternal Aunt     No Known Problems Paternal Aunt     No Known Problems Paternal Aunt     No Known Problems Paternal Aunt     No Known Problems Paternal Aunt     No Known Problems Brother     No Known Problems Brother        Physical Exam:     Vitals:   Blood Pressure: 113/62 (09/19/19 2239)  Pulse: (!) 108 (09/19/19 2239)  Temperature: (!) 102 9 °F (39 4 °C) (09/19/19 2239)  Temp Source: Oral (09/19/19 1626)  Respirations: (!) 24 (09/19/19 2239)  Weight - Scale: 91 2 kg (201 lb 1 oz) (09/19/19 1626)  SpO2: 100 % (09/19/19 2239)    Constitutional:  Well developed, well nourished, no acute distress, ill appearing  Eyes:  PERRL, conjunctiva normal   HENT:  Atraumatic, external ears normal, nose normal, oropharynx moist, no pharyngeal exudates  Neck- normal range of motion, no tenderness, supple   Respiratory:  Visibly tachypneic and with conversational dyspnea    Decreased breath sounds bilaterally with bilateral rales however exam limited as patient unable to move for exam at this time  Cardiovascular:  Tachycardic, normal rhythm, no murmurs, no gallops, no rubs GI:  Soft, nondistended, normal bowel sounds, nontender, no organomegaly, no mass, no rebound, no guarding   :  No costovertebral angle tenderness   Musculoskeletal:  No edema, no tenderness, no deformities  Back- no tenderness  Integument:  Well hydrated, no rash   Lymphatic:  No lymphadenopathy noted   Neurologic:  Alert &awake, communicative, CN 2-12 normal, normal motor function, normal sensory function, no focal deficits noted   Psychiatric:  Speech and behavior appropriate       Lab Results: I have personally reviewed pertinent reports  Results from last 7 days   Lab Units 09/19/19  1711   WBC Thousand/uL 16 15*   HEMOGLOBIN g/dL 7 5*   HEMATOCRIT % 22 4*   PLATELETS Thousands/uL 131*   LYMPHO PCT % 4*   MONO PCT % 5   EOS PCT % 0     Results from last 7 days   Lab Units 09/19/19  1711   POTASSIUM mmol/L 2 9*   CHLORIDE mmol/L 97*   CO2 mmol/L 18*   BUN mg/dL 37*   CREATININE mg/dL 1 91*   CALCIUM mg/dL 8 6   ALK PHOS U/L 91   ALT U/L 41   AST U/L 55*     Results from last 7 days   Lab Units 09/19/19  1711   INR  3 29*       EKG: N/A    Imaging: I have personally reviewed pertinent reports  Xr Chest Pa & Lateral    Result Date: 8/28/2019  Narrative: CHEST INDICATION:   I95 1: Orthostatic hypotension  Shortness of breath  History of endometrial cancer COMPARISON:  None EXAM PERFORMED/VIEWS:  XR CHEST PA & LATERAL  The frontal view was performed utilizing dual energy radiographic technique  FINDINGS:  Psmnla-e-Xiae catheter is present with its tip at the cavoatrial junction  Cardiomediastinal silhouette appears unremarkable  The lungs are clear  No pneumothorax or pleural effusion  Osseous structures appear within normal limits for patient age  Impression: No acute cardiopulmonary disease  Workstation performed: [id number]         ** Please Note: Dragon 360 Dictation voice to text software was used in the creation of this document   **

## 2019-09-20 NOTE — ASSESSMENT & PLAN NOTE
· Appreciate Gynecologic Oncology evaluation, she received Taxol/carboplatin and received last chemotherapy 09/09/2019  · Received granix 08/31/2019, also Neulasta 09/09/2019 and 09/16/2019  · Chemotherapy to be on hold for now given acute issues

## 2019-09-20 NOTE — PROGRESS NOTES
Vancomycin IV Pharmacy-to-Dose Consultation    Aliza Morris is a 60 y o  female who is currently receiving Vancomycin IV with management by the Pharmacy Consult service  Relevant clinical data and objective / subjective history reviewed  Vancomycin Assessment:  Indication: bacteremia  Status: stable, afebrile, mild leukocytosis  Micro:             9/20 blood culture x 2: in process            9/19 urine culture: in process            9/19 blood culture: staph aureus; gram positive cocci in clusters  Procalcitonin: positive             9/20: 4 97             9/19: 4 39  Renal Function: MONROE with SCr of 1 91 on admission (9/19); currently 1 6 (9/20) with a baseline of ~0 7; current CrCl 34 2 mL/min  Potential Nephrotoxicity Factors:  Patient-Factors: advanced age, hypotension, renal dysfunction  Days of Therapy: 2  Ordered Dose: 1250 mg IV q24h (never given); received 1250 mg load on admission 9/19 at [address]: 15-20 (appropriate for most indications)   Goal AUC(24h): 400-600  Last Level: n/a      Vancomycin Plan:  New Dosing: change to 1000 mg IV when level < 20 (pulse dosing) starting tonight (9/20) at 2000  Next Level: random level to be drawn 24 hours after loading dose tonight (9/20) at 1900  Renal Function Monitoring: continue to monitor SCr and UOP; BMP scheduled for tomorrow (9/21) AM      Pharmacy will continue to follow closely for s/sx of nephrotoxicity, infusion reactions and appropriateness of therapy  BMP and CBC will be ordered per protocol  We will continue to follow the patient's culture results and clinical progress daily         Tiesha Vaughan, PharmD  Pharmacist

## 2019-09-20 NOTE — BRIEF OP NOTE (RAD/CATH)
IR PORT REMOVAL    PATIENT NAME: Breonna Arevalo  : 1955  MRN: [id number]     Pre-op Diagnosis:   1  Fever    2  Sepsis, due to unspecified organism ([facility] [address] )    3  Endometrial cancer ([facility] [address] )    4  Bacteremia      Post-op Diagnosis:   1  Fever    2  Sepsis, due to unspecified organism ([facility] [address] )    3  [facility]    4   Bacteremia        Surgeon:   Nadeem Sheets MD  Assistants:     No qualified resident was available, Resident is only observing    Estimated Blood Loss: none  Findings:     Infected port pocket    6 K[place] drain left in place    Closed with loose sutures    Specimens: port for culture    Complications:  None immediaet    Anesthesia: Local and fentanyl    Antibiotics: cefepime    Nadeem Sheets MD     Date: 2019  Time: 7:36 PM

## 2019-09-20 NOTE — ASSESSMENT & PLAN NOTE
· Sodium 129 on admission  · Hypovolemic hypernatremia  · Status is IV fluids followed by Lasix sodium is better 132 today

## 2019-09-20 NOTE — ASSESSMENT & PLAN NOTE
· Patient visibly tachypneic during my evaluation and requiring 3L NC supplemental oxygen  Received 3 L NS as part of sepsis protocol and CXR revealing evidence of pulmonary vascular congestion  No prior echocardiogram is available for review    · Will provide 20mg IV Lasix now; patient is Lasix naive  · Close monitoring of respiratory status, low threshold to initiate BiPAP/escalate level of care if patient should desaturate or remain tachypneic

## 2019-09-20 NOTE — ASSESSMENT & PLAN NOTE
· Staph aureus bacteremia  · Suspect from port, no other skin source  · consulted ID & pharmacy - IV Vanco  · Repeat cultures  · IR consult for port removal

## 2019-09-20 NOTE — PLAN OF CARE
Problem: Potential for Falls  Goal: Patient will remain free of falls  Description  INTERVENTIONS:  - Assess patient frequently for physical needs  -  Identify cognitive and physical deficits and behaviors that affect risk of falls  -  [place] fall precautions as indicated by assessment   - Educate patient/family on patient safety including physical limitations  - Instruct patient to call for assistance with activity based on assessment  - Modify environment to reduce risk of injury  - Consider OT/PT consult to assist with strengthening/mobility  Outcome: Progressing     Problem: Nutrition/Hydration-ADULT  Goal: Nutrient/Hydration intake appropriate for improving, restoring or maintaining nutritional needs  Description  Monitor and assess patient's nutrition/hydration status for malnutrition  Collaborate with interdisciplinary team and initiate plan and interventions as ordered  Monitor patient's weight and dietary intake as ordered or per policy  Utilize nutrition screening tool and intervene as necessary  Determine patient's food preferences and provide high-protein, high-caloric foods as appropriate       INTERVENTIONS:  - Monitor oral intake, urinary output, labs, and treatment plans  - Assess nutrition and hydration status and recommend course of action  - Evaluate amount of meals eaten  - Assist patient with eating if necessary   - Allow adequate time for meals  - Recommend/ encourage appropriate diets, oral nutritional supplements, and vitamin/mineral supplements  - Order, calculate, and assess calorie counts as needed  - Recommend, monitor, and adjust tube feedings and TPN/PPN based on assessed needs  - Assess need for intravenous fluids  - Provide specific nutrition/hydration education as appropriate  - Include patient/family/caregiver in decisions related to nutrition  Outcome: Progressing     Problem: INFECTION - ADULT  Goal: Absence or prevention of progression during hospitalization  Description  INTERVENTIONS:  - Assess and monitor for signs and symptoms of infection  - Monitor lab/diagnostic results  - Monitor all insertion sites, i e  indwelling lines, tubes, and drains  - Monitor endotracheal if appropriate and nasal secretions for changes in amount and color  - [place] appropriate cooling/warming therapies per order  - Administer medications as ordered  - Instruct and encourage patient and family to use good hand hygiene technique  - Identify and instruct in appropriate isolation precautions for identified infection/condition  Outcome: Progressing  Goal: Absence of fever/infection during neutropenic period  Description  INTERVENTIONS:  - Monitor WBC    Outcome: Progressing     Problem: Potential for Falls  Goal: Patient will remain free of falls  Description  INTERVENTIONS:  - Assess patient frequently for physical needs  -  Identify cognitive and physical deficits and behaviors that affect risk of falls  -  [place] fall precautions as indicated by assessment   - Educate patient/family on patient safety including physical limitations  - Instruct patient to call for assistance with activity based on assessment  - Modify environment to reduce risk of injury  - Consider OT/PT consult to assist with strengthening/mobility  Outcome: Progressing     Problem: Nutrition/Hydration-ADULT  Goal: Nutrient/Hydration intake appropriate for improving, restoring or maintaining nutritional needs  Description  Monitor and assess patient's nutrition/hydration status for malnutrition  Collaborate with interdisciplinary team and initiate plan and interventions as ordered  Monitor patient's weight and dietary intake as ordered or per policy  Utilize nutrition screening tool and intervene as necessary  Determine patient's food preferences and provide high-protein, high-caloric foods as appropriate       INTERVENTIONS:  - Monitor oral intake, urinary output, labs, and treatment plans  - Assess nutrition and hydration status and recommend course of action  - Evaluate amount of meals eaten  - Assist patient with eating if necessary   - Allow adequate time for meals  - Recommend/ encourage appropriate diets, oral nutritional supplements, and vitamin/mineral supplements  - Order, calculate, and assess calorie counts as needed  - Recommend, monitor, and adjust tube feedings and TPN/PPN based on assessed needs  - Assess need for intravenous fluids  - Provide specific nutrition/hydration education as appropriate  - Include patient/family/caregiver in decisions related to nutrition  Outcome: Progressing     Problem: INFECTION - ADULT  Goal: Absence or prevention of progression during hospitalization  Description  INTERVENTIONS:  - Assess and monitor for signs and symptoms of infection  - Monitor lab/diagnostic results  - Monitor all insertion sites, i e  indwelling lines, tubes, and drains  - Monitor endotracheal if appropriate and nasal secretions for changes in amount and color  - [place] appropriate cooling/warming therapies per order  - Administer medications as ordered  - Instruct and encourage patient and family to use good hand hygiene technique  - Identify and instruct in appropriate isolation precautions for identified infection/condition  Outcome: Progressing  Goal: Absence of fever/infection during neutropenic period  Description  INTERVENTIONS:  - Monitor WBC    Outcome: Progressing     Problem: SAFETY ADULT  Goal: Maintain or return to baseline ADL function  Description  INTERVENTIONS:  -  Assess patient's ability to carry out ADLs; assess patient's baseline for ADL function and identify physical deficits which impact ability to perform ADLs (bathing, care of mouth/teeth, toileting, grooming, dressing, etc )  - Assess/evaluate cause of self-care deficits   - Assess range of motion  - Assess patient's mobility; develop plan if impaired  - Assess patient's need for assistive devices and provide as appropriate  - Encourage maximum independence but intervene and supervise when necessary  - Involve family in performance of ADLs  - Assess for home care needs following discharge   - Consider OT consult to assist with ADL evaluation and planning for discharge  - Provide patient education as appropriate  Outcome: Progressing  Goal: Maintain or return mobility status to optimal level  Description  INTERVENTIONS:  - Assess patient's baseline mobility status (ambulation, transfers, stairs, etc )    - Identify cognitive and physical deficits and behaviors that affect mobility  - Identify mobility aids required to assist with transfers and/or ambulation (gait belt, sit-to-stand, lift, walker, cane, etc )  - [place] fall precautions as indicated by assessment  - Record patient progress and toleration of activity level on Mobility SBAR; progress patient to next Phase/Stage  - Instruct patient to call for assistance with activity based on assessment  - Consider rehabilitation consult to assist with strengthening/weightbearing, etc   Outcome: Progressing     Problem: DISCHARGE PLANNING  Goal: Discharge to home or other facility with appropriate resources  Description  INTERVENTIONS:  - Identify barriers to discharge w/patient and caregiver  - Arrange for needed discharge resources and transportation as appropriate  - Identify discharge learning needs (meds, wound care, etc )  - Arrange for interpretive services to assist at discharge as needed  - Refer to Case Management Department for coordinating discharge planning if the patient needs post-hospital services based on physician/advanced practitioner order or complex needs related to functional status, cognitive ability, or social support system  Outcome: Progressing

## 2019-09-20 NOTE — ASSESSMENT & PLAN NOTE
· POA with fevers, leukocytosis, tachycardia  Elevated procalcitonin on admission however with normal lactic acid level  · 2/2 bacteremia BC growing staph aureus  · procal worse today, restarted IV vanco, ID consutled, IR consulted for port removal  · Repeat BC  · cdiff if diarrhea  · F/u urine cultures  · Repeat procalcitonin in a m

## 2019-09-20 NOTE — ASSESSMENT & PLAN NOTE
· Evidence upon admission with tachypnea, dyspnea, hypoxia after IV fluid resuscitation for sepsis  · Stairs with 20 mg IV Lasix  · Appears better today  · Wean O2  · Hold off on further diuresis

## 2019-09-20 NOTE — H&P (VIEW-ONLY)
Consultation - Infectious Disease   Gilberto Griffiths 60 y o  female MRN: [id number]  Unit/Bed#: [facility] 912-01 Encounter: [id number]      IMPRESSION & RECOMMENDATIONS:     72-year-old female patient with on the mattress cancer currently treated with chemotherapy through a right chest wall Port-A-Cath, presenting for fever and severe sepsis found to have Staph aureus bacteremia  1- severe sepsis, POA:  With fever, hypotension that responded to IVF, [place]  Sepsis is secondary to #2  - supportive care and IVF as per primary team  - continue antibiotics as below  - remove PORT-o-cath from right chest wall as it is likely infected      2- Staph aureus bacteremia and Port-A-Cath infection:  Blood culture collected on admission on 09/19/2019 is positive for Staph aureus in 1/1 set  Sensitivity of the Staph aureus is still in process  - continue vancomycin  Stop cefepime  - pharmacy consult for vancomycin dosing  - repeat blood culture daily until bacteremia clears  - please do not use port  Port needs to be removed  - check TTE  Patient will likely need TRINH to rule out infective endocarditis  - follow sensitivity of the Staph aureus and adjust antibiotics accordingly  - do not insert a new port until the repeat blood culture is negative and patient is persistently afebrile  - repeat CBC, close clinical follow-up and serial exams      3- Endometrial cancer:  Last chemotherapy received 09/09/2019  - oncology follow-up      4- acute kidney injury:  Creatinine 1 6 mg/dl today  -  consider Nephrology evaluation if no improvement  - repeat BMP    5- acute respiratory failure: Tachypnea noted physical exam   Chest x-ray showing vascular congestion  Patient reports improvement in her shortness of breath today  - monitor clinically    - diuresis as per primary team    Case discussed with primary team    HISTORY OF PRESENT ILLNESS:  Reason for Consult: MSSA bacteremia  HPI: Gilberto Griffiths is a 60y o  year old female with endometrial cancer  status post TAHBSO 12/20/2017, obstructive nephropathy status post right percutaneous nephrostomy, right side chest wall port insertion June 2019, currently on chemotherapy every 3 weeks, also with history of hypertension obesity dyslipidemia, now admitted 09/19/2019 for fever weakness and shortness of breath for 3 days duration  Fever reached 101 8 at home, upon presentation to ER patient was febrile at 102 1; she was also hypotensive with blood pressure 84/42 mmHg  And in [place]  Patient was diagnosed with severe sepsis; a blood culture was collected and patient was started on vancomycin cefepime  Today her blood culture collected from the port is growing Staph aureus  Patient of already reports improvement in the past 24 hours, but she did have fever of 102 this afternoon  Upon evaluation today, patient denies pain, she denies headache, denies nausea vomiting or diarrhea, denies dysuria, denies rash or arthralgia  Patient has chronic cough, she denies recent worsening she denies phlegm production  REVIEW OF SYSTEMS:  A complete 12 point system-based review of systems is negative other than that noted in the HPI      PAST MEDICAL HISTORY:  Past Medical History:   Diagnosis Date    Anemia     Chemotherapy follow-up examination     Depression     [facility] mellitus [facility] )     Endometrial cancer ([facility] ) 12/2017    Hyperglycemia     vx type 2 dm -- last assessed 4/1/14; resolved 11/7/17    Hyperlipidemia     Hypertension     Obesity     last assessed 10/14/17; resolved 11/7/17     Past Surgical History:   Procedure Laterality Date    ABDOMINAL SURGERY      GASTRIC BYPASS    CHOLECYSTECTOMY      at the time of gastric bypass    COLONOSCOPY      CT NEEDLE BIOPSY LYMPH NODE  7/8/2019    GASTRIC BYPASS      HYSTERECTOMY Bilateral     total abdominal with salpingo-oophorectomy    IR PORT PLACEMENT  7/26/2019    IR TUBE PLACEMENT NEPHROSTOMY  7/26/2019    OOPHORECTOMY Bilateral     MI LAP, RADICAL HYST W/ TUBE&OV, NODE BX N/A 2017    Procedure: HYSTERECTOMY LAPAROSCOPIC TOTAL ([address]) W/ ROBOTICS; BILATERAL SALPINGOOPHERECTOMY; LYMPH NODE DISSECTION; lysis of adhesions;  Surgeon: Cordell Delaney MD;  [facility]: [facility];  Service: Gynecology Oncology    MI LAP,DIAGNOSTIC ABDOMEN N/A 2017    Procedure: LAPAROSCOPY DIAGNOSTIC;  Surgeon: Cordell Delaney MD;  [facility];  Service: Gynecology Oncology    TONSILLECTOMY      US GUIDED BREAST BIOPSY RIGHT COMPLETE Right 2019       FAMILY HISTORY:  Non-contributory    SOCIAL HISTORY:  Social History   Social History     Substance and Sexual Activity   Alcohol Use Not Currently     Social History     Substance and Sexual Activity   Drug Use No     Social History     Tobacco Use   Smoking Status Never Smoker   Smokeless Tobacco Never Used       ALLERGIES:  Allergies   Allergen Reactions    Cephalosporins Other (See Comments)     RASH - BILATERAL ARMS PER RN       MEDICATIONS:  All current active medications have been reviewed        PHYSICAL EXAM:  Temp:  [98 1 °F (36 7 °C)-102 9 °F (39 4 °C)] 102 °F (38 9 °C)  HR:  [] 100  Resp:  [18-52] 20  BP: ()/(52-86) 118/61  SpO2:  [88 %-100 %] 96 %  Temp (24hrs), Av 3 °F (37 9 °C), Min:98 1 °F (36 7 °C), Max:102 9 °F (39 4 °C)  Current: Temperature: (!) 102 °F (38 9 °C)    Intake/Output Summary (Last 24 hours) at 2019 1638  Last data filed at 2019 1508  Gross per 24 hour   Intake 3760 ml   Output 1100 ml   Net 2660 ml       General Appearance:  Appearing well, nontoxic, but uncomfortable   Head:  Normocephalic, without obvious abnormality, atraumatic   Eyes:  Conjunctiva pink and sclera anicteric, both eyes   Nose: Nares normal, mucosa normal, no drainage   Throat: Oropharynx moist without lesions   Neck: Supple, symmetrical, no adenopathy, no tenderness/mass/nodules   Back:   Symmetric, no curvature, ROM normal, no CVA tenderness   Lungs:   Clear to auscultation bilaterally, respirations unlabored   Chest Wall:  No tenderness or deformity   Heart:  RRR; no murmur, rub or gallop   Abdomen:   Soft, non-tender, non-distended, positive bowel sounds; right PCN in place  Extremities: No cyanosis, clubbing or edema   Skin: No rashes or lesions  No draining wounds noted  Erythema along the course of the port in right chest wall  Lymph nodes: Cervical, supraclavicular nodes normal   Neurologic: Alert and oriented times 3, extremity strength 5/5 and symmetric       LABS, IMAGING, & OTHER STUDIES:  Lab Results:  I have personally reviewed pertinent labs  Results from last 7 days   Lab Units 09/20/19  0515 09/19/19  1711   WBC Thousand/uL 11 45* 16 15*   HEMOGLOBIN g/dL 9 2* 7 5*   PLATELETS Thousands/uL 90* 131*     Results from last 7 days   Lab Units 09/20/19  0521 09/20/19  0515 09/19/19  1711   SODIUM mmol/L 132* 132* 129*   POTASSIUM mmol/L 2 8* 2 8* 2 9*   CHLORIDE mmol/L 103 103 97*   CO2 mmol/L 17* 18* 18*   BUN mg/dL 33* 34* 37*   CREATININE mg/dL 1 60* 1 60* 1 91*   EGFR ml/min/1 73sq m 34 34 28   CALCIUM mg/dL 8 1* 8 1* 8 6   AST U/L  --   --  55*   ALT U/L  --   --  41   ALK PHOS U/L  --   --  91     Results from last 7 days   Lab Units 09/19/19  1711   BLOOD CULTURE  Staphylococcus aureus*   GRAM STAIN RESULT  Gram positive cocci in clusters*     Results from last 7 days   Lab Units 09/20/19  0515 09/19/19  1711   PROCALCITONIN ng/ml 4 97* 4 39*       Imaging Studies:   I have personally reviewed pertinent imaging study reports and images in PACS  Other Studies:   I have personally reviewed pertinent reports

## 2019-09-20 NOTE — ASSESSMENT & PLAN NOTE
· Sodium 129 on admission  · Received 3 L NS bolus in ED, due to shortness of breath with evidence of volume overload 20 mg IV Lasix was given  · Will recheck BMP at midnight

## 2019-09-20 NOTE — ED NOTES
Pt noted to be tachypnic, wheezy, SOB and hypoxic  Dr Amanda Gordon and Dr Lesly Haji made aware  Pt placed on 3L o2 at this time       Jose Loco RN  09/19/19 2051

## 2019-09-20 NOTE — INTERVAL H&P NOTE
Update:     64F with port infection and staph bacteremia  Sepsis - urgent removal indicated    INR elevated, on xarelto  Thrombocytopenia to 90    Will give abx - vanco may have casued [place], ceph allergy noted    Discussed higher risk of bleeding, would not recommend waiting    MP2 ASA4      Patient re-evaluated   Accept as history and physical     Emilee Jay MD/September 20, 2019/6:45 PM

## 2019-09-20 NOTE — SOCIAL WORK
Cm met with patient to review the role of Cm  Pt presented as alert during the conversation  Pt lives with mother Curtis Jefferson [phone] in 2 story home, 2 steps leading into the house, 10 steps to the bedroom and bathroom  Pt reported that sister Citlalli Fermin [phone] is emergency contact  Pt reported being independent with ADLS PTA  Pt reported inpatient PT/OT with [facility] in 2010  Pt denies any inpatient MH, substance abuse or [place]  services  Pt PCP is affiliated with [facility] and preferred pharmacy is [facility] in [place]  Pt works part-time but currently not working and drives independently  Pt has DME cane  CM reviewed d/c planning process including the following: identifying help at home, patient preference for d/c planning needs, Discharge Lounge, [facility] Meds to Bed program, availability of treatment team to discuss questions or concerns patient and/or family may have regarding understanding medications and recognizing signs and symptoms once discharged  CM also encouraged patient to follow up with all recommended appointments after discharge  Patient advised of importance for patient and family to participate in managing patients medical well being

## 2019-09-20 NOTE — ASSESSMENT & PLAN NOTE
· K 2 9 on admission and received 40 mEq oral potassium in ED  · Will recheck potassium level with BMP at midnight

## 2019-09-20 NOTE — ASSESSMENT & PLAN NOTE
· Suspected and POA with fevers, leukocytosis, tachycardia  Elevated procalcitonin on admission however with normal lactic acid level  · Possible source includes urinary however also with right nephrostomy tube in place  CXR with evidence of volume overload but no infiltrate visualized via wet read  Does have a Port-A-Cath in place additionally  · Received 3 L NS bolus and currently is tachypneic and remains febrile  · Received cefepime and vancomycin in ED and will continue broad-spectrum antibiotics for now pending results of blood culture (only one drawn in ED prior to antibiotic administration due to poor access) and urine culture  · She did report diarrhea, will check C diff PCR if patient should have recurrent diarrhea  · Repeat procalcitonin in a m    · Trend WBC, temperature curve, hemodynamics

## 2019-09-20 NOTE — PROGRESS NOTES
Progress Note - Kendra Castellano Pulcini 1955, 60 y o  female MRN: [id number]    Unit/Bed#: [facility] 912-01 Encounter: [id number]    Primary Care Provider: Selin Guadarrama DO   Date and time admitted to hospital: 9/19/2019  4:09 PM        Acute respiratory failure [facility])  Assessment & Plan  · Evidence upon admission with tachypnea, dyspnea, hypoxia after IV fluid resuscitation for sepsis  · Stairs with 20 mg IV Lasix  · Appears better today  · Wean O2  · Hold off on further diuresis    Bacteremia  Assessment & Plan  · Staph aureus bacteremia  · Suspect from port, no other skin source  · consulted ID & pharmacy - IV Vanco  · Repeat cultures  · IR consult for port removal    Hyponatremia  Assessment & Plan  · Sodium 129 on admission  · Hypovolemic hypernatremia  · Status is IV fluids followed by Lasix sodium is better 132 today    Hypokalemia  Assessment & Plan  · K 2 9 on admission and received 40 mEq oral potassium in ED  · Followed by Lasix, repeat was the same today 2 8, refusing p o  Potassium hence ordered 40 IV x2    Volume overload  Assessment & Plan  · Suspected by the admitting physician at the time of evaluation due to dyspnea, tachypnea, hypoxia  · Received 3 L of fluid for sepsis followed by 20 mg of IV Lasix  · No longer appears volume overload ordered  · Hold off on further diuresis    Acute kidney injury ([address] )  Assessment & Plan  · POA, baseline approximately 0 6  · Suspect in setting of sepsis, poor p o   Intake  · Received 3 L NS in ED however unfortunately with evidence of volume overload also got some lasix  · Cr today 1 6  · No more hypoxia or SOB, will place her on IVF @ 50  · Will repeat BMP this a m   · Hold quinapril, renally dose medications otherwise    Anemia due to chemotherapy  Assessment & Plan  · Hemoglobin 7 5 on admission, repeat 9 2  · Will monitor with CBC, consider transfusion for hemoglobin less than 7 or symptomatic    Acute deep vein thrombosis (DVT) of proximal vein of lower extremity [facility])  Assessment & Plan  · On Eliquis for this    Endometrial cancer [facility])  Assessment & Plan  · Appreciate Gynecologic Oncology evaluation, she received Taxol/carboplatin and received last chemotherapy 09/09/2019  · Received granix 08/31/2019, also Neulasta 09/09/2019 and 09/16/2019  · Chemotherapy to be on hold for now given acute infection    Benign essential hypertension  Assessment & Plan  · Holding quinapril for now given acute kidney injury  · Blood pressure monitoring per protocol    * Sepsis ([facility] )  Assessment & Plan  · POA with fevers, leukocytosis, tachycardia  Elevated procalcitonin on admission however with normal lactic acid level  · 2/2 bacteremia BC growing staph aureus  · procal worse today, restarted IV vanco, ID consutled, IR consulted for port removal  · Repeat BC  · cdiff if diarrhea  · F/u urine cultures  · Repeat procalcitonin in a m  [address] Internal Medicine Progress Note  Patient: Aliza Morris 60 y o  female   MRN: [id number]  PCP: Roberto Owen DO  [facility] Encounter: [id number]  Date Of Visit: 09/20/19    Assessment:    Principal Problem:    Sepsis ([facility] )  Active Problems:    Benign essential hypertension    [facility] )    Acute deep vein thrombosis (DVT) of proximal vein of lower extremity ([facility] )    Anemia due to chemotherapy    Acute kidney injury ([facility] )    Volume overload    Hypokalemia    Hyponatremia    Bacteremia    Acute respiratory failure (HCC)         VTE Pharmacologic Prophylaxis:   Pharmacologic: Rivaroxaban (Xarelto)  Mechanical VTE Prophylaxis in Place: Yes    Patient Centered Rounds: I have performed bedside rounds with nursing staff today  Discussions with Specialists or Other Care Team Provider:     Education and Discussions with Family / Patient: patient    Time Spent for Care: 30 minutes  More than 50% of total time spent on counseling and coordination of care as described above      Current Length of Stay: 1 day(s)    Current Patient Status: Inpatient   Certification Statement: The patient will continue to require additional inpatient hospital stay due to sepsis    Discharge Plan / Estimated Discharge Date: unclear    Code Status: Level 1 - Full Code      Subjective:   Feels ok, no more BM, no CP/SOB, no new cough    Objective:     Vitals:   Temp (24hrs), Av 4 °F (38 °C), Min:98 1 °F (36 7 °C), Max:102 9 °F (39 4 °C)    Temp:  [98 1 °F (36 7 °C)-102 9 °F (39 4 °C)] 102 °F (38 9 °C)  HR:  [] 100  Resp:  [18-52] 20  BP: ()/(42-86) 118/61  SpO2:  [88 %-100 %] 96 %  Body mass index is 40 98 kg/m²  Input and Output Summary (last 24 hours): Intake/Output Summary (Last 24 hours) at 2019 1622  Last data filed at 2019 1508  Gross per 24 hour   Intake 3760 ml   Output 1100 ml   Net 2660 ml       Physical Exam:     Physical Exam   Constitutional: She is oriented to person, place, and time  She appears well-developed and well-nourished  HENT:   Head: Normocephalic and atraumatic  Eyes: Conjunctivae are normal    Cardiovascular: Normal rate and regular rhythm  Exam reveals no friction rub  No murmur heard  Pulmonary/Chest: Effort normal and breath sounds normal  No stridor  No respiratory distress  She has no wheezes  Abdominal: Soft  She exhibits no distension  There is no tenderness  Neurological: She is alert and oriented to person, place, and time  Vitals reviewed      Additional Data:     Labs:    Results from last 7 days   Lab Units 19  0515 19  1711   WBC Thousand/uL 11 45* 16 15*   HEMOGLOBIN g/dL 9 2* 7 5*   HEMATOCRIT % 28 1* 22 4*   PLATELETS Thousands/uL 90* 131*   LYMPHO PCT %  --  4*   MONO PCT %  --  5   EOS PCT %  --  0     Results from last 7 days   Lab Units 19  0521  19  1711   POTASSIUM mmol/L 2 8*   < > 2 9*   CHLORIDE mmol/L 103   < > 97*   CO2 mmol/L 17*   < > 18*   BUN mg/dL 33*   < > 37*   CREATININE mg/dL 1 60*   < > 1 91*   CALCIUM mg/dL 8 1*   < > 8 6 ALK PHOS U/L  --   --  91   ALT U/L  --   --  41   AST U/L  --   --  55*    < > = values in this interval not displayed  Results from last 7 days   Lab Units 09/19/19  1711   INR  3 29*       * I Have Reviewed All Lab Data Listed Above  * Additional Pertinent Lab Tests Reviewed: All Labs Within Last 24 Hours Reviewed    Imaging:    Imaging Reports Reviewed Today Include:   Imaging Personally Reviewed by Myself Includes:      Recent Cultures (last 7 days):     Results from last 7 days   Lab Units 09/19/19  1711   BLOOD CULTURE  Staphylococcus aureus*   GRAM STAIN RESULT  Gram positive cocci in clusters*       Last 24 Hours Medication List:     Current Facility-Administered Medications:  acetaminophen 650 mg Oral Q6H PRN Emperatriz Rosas PA-C   ARIPiprazole 10 mg Oral Daily Cori Nemaha, DO   aspirin 81 mg Oral Daily Cori Nemaha, DO   cholecalciferol 1,000 Units Oral Daily Cori Nemaha, DO   mupirocin  Topical TID Cori Nemaha, DO   oxybutynin 10 mg Oral Daily Cori Nemaha, DO   pantoprazole 40 mg Oral Early Morning Cori Nemaha, DO   potassium chloride 20 mEq Intravenous Q2H Zo Burgos MD   rivaroxaban 20 mg Oral Daily With Breakfast Cori Nemaha, DO   sodium chloride (PF) 10 mL Intracatheter Daily Cori Nemaha, DO   sodium chloride 50 mL/hr Intravenous Continuous Zo Burgos MD   vancomycin 15 mg/kg (Adjusted) Intravenous Once PRN Bryce Vidal MD   venlafaxine 75 mg Oral TID Cori Nemaha, DO        Today, Patient Was Seen By: Bryce Vidal MD    ** Please Note: This note has been constructed using a voice recognition system   **

## 2019-09-20 NOTE — ASSESSMENT & PLAN NOTE
· Suspected by the admitting physician at the time of evaluation due to dyspnea, tachypnea, hypoxia  · Received 3 L of fluid for sepsis followed by 20 mg of IV Lasix  · No longer appears volume overload ordered  · Hold off on further diuresis

## 2019-09-20 NOTE — RESPIRATORY THERAPY NOTE
RT Protocol Note  Carolina Lovetti 60 y o  female MRN: [id number]  Unit/Bed#: [facility] 912-01 Encounter: [id number]    Assessment    Principal Problem:    Sepsis ([place] [address] )  Active Problems:    Benign essential hypertension    Endometrial cancer ([place] [address] )    Acute deep vein thrombosis (DVT) of proximal vein of lower extremity (HCC)    Anemia due to chemotherapy    Acute kidney injury ([place] [address] )    Volume overload    Hypokalemia    Hyponatremia      Home Pulmonary Medications:  na       Past Medical History:   Diagnosis Date    Anemia     Chemotherapy follow-up examination     Depression     Diabetes mellitus ([place] [address] )     Endometrial cancer ([place] [address] ) 12/2017    Hyperglycemia     vx type 2 dm -- last assessed 4/1/14; resolved 11/7/17    Hyperlipidemia     Hypertension     Obesity     last assessed 10/14/17; resolved 11/7/17     Social History     Socioeconomic History    Marital status: Single     Spouse name: Not on file    Number of children: Not on file    Years of education: Not on file    Highest education level: Not on file   Occupational History    Not on file   Social Needs    Financial resource strain: Not on file    Food insecurity:     Worry: Not on file     Inability: Not on file    Transportation needs:     Medical: Not on file     Non-medical: Not on file   Tobacco Use    Smoking status: Never Smoker    Smokeless tobacco: Never Used   Substance and Sexual Activity    Alcohol use: Not Currently    Drug use: No    Sexual activity: Not Currently   Lifestyle    Physical activity:     Days per week: Not on file     Minutes per session: Not on file    Stress: Not on file   Relationships    Social connections:     Talks on phone: Not on file     Gets together: Not on file     Attends Mu-ism service: Not on file     Active member of club or organization: Not on file     Attends meetings of clubs or organizations: Not on file     Relationship status: Not on file    Intimate partner violence:     Fear of current or ex partner: Not on file     Emotionally abused: Not on file     Physically abused: Not on file     Forced sexual activity: Not on file   Other Topics Concern    Not on file   Social History Narrative    Not on file       Subjective         Objective    Physical Exam:   Assessment Type: Assess only  General Appearance: Awake  Respiratory Pattern: Dyspnea at rest  Chest Assessment: Chest expansion symmetrical  Bilateral Breath Sounds: Clear, Diminished  Cough: Non-productive  O2 Device: nasal cannula    Vitals:  Blood pressure 113/62, pulse 99, temperature (!) 102 1 °F (38 9 °C), resp  rate (!) 24, weight 91 2 kg (201 lb 1 oz), SpO2 100 %, not currently breastfeeding  Imaging and other studies: I have personally reviewed pertinent reports  and I have personally reviewed pertinent films in PACS    O2 Device: nasal cannula     Plan    Respiratory Plan: Mild Distress pathway        Resp Comments: (P) Pt  evaluated at bedside per Respiratory Protocol  Pt  admitted with sepsis/SOB at this time  Pt  has no prior Pulmonary Hx  but is somewhat dyspneic at rest   CXR suggested Pulmonary vascular congestion  Breath sounds are diminished in bases but clear in upper lobes bilaterally  Pt  started on IS and found taking a deep breath difficult at this time  No Pulmonary medications indicated at this time but will continue to follow pt  with IS W/A per Respiratory Protocol

## 2019-09-20 NOTE — ASSESSMENT & PLAN NOTE
· K 2 9 on admission and received 40 mEq oral potassium in ED  · Followed by Lasix, repeat was the same today 2 8, refusing p o   Potassium hence ordered 40 IV x2

## 2019-09-20 NOTE — PROGRESS NOTES
GYN-ONC Progress Note  Aliza Morris  [id number]  PPHP [id number]/PPHP [id number]  9/20/2019  5:54 AM    ASSESS:  82-year-old with recurrent stage I B1 endometrial cancer with biopsy-proven evidence of right pelvic sidewall recurrence, on  Carbo/taxol/neulasta, with sepsis    PLAN:    1  Sepsis:  In the setting of fevers, leukocytosis, tachycardia, elevated procalcitonin  Normal lactic acid  Cultures pending, CXR final read pending, C diff pending  Trend white count and fever curve  Continue cefepime and vancomycin  2  Endometrial cancer:  confirmed right pelvic side wall recurrence, on carbo/taxol/neulasta-last tx 9/16, with appropriate leukocytosis in the setting of sepsis  3  Acute kidney injury:  Elevated creatinine appreciated, right nephrostomy tube in place, continue IV hydration, hold ACE-inhibitor, consider Urology consultation the setting of right nephrostomy tube in place and plan for a change in the coming weeks    4  Volume overload: In the setting of IV fluids per sepsis protocol, s/p dose of lasix for diuresis    5  Anemia: In the setting of ongoing chemotherapy, transfusion if hemoglobin drops below 7    6   Hypertension:  Home ACE inhibitor held    Dispo: inpatient    PPx: SCDs, home Eliquis  FEN: regular  diet, replete lytes prn,  Pain mgmnt: per primary team   Invasive lines: R nephrostomy tube   Code status: full  ____________________    SUBJECTIVE  History of Present Illness:  Overnight:  Reports feeling less short of breath overnight after initiation of oxygen, able to sleep somewhat  Pain: no  Tolerating Oral Intake: yes   Voiding: yes  Flatus: yes  Bowel Movement:  Denies any stool output overnight  Ambulating: no  Vaginal Bleeding: no  Pelvic Pain: no  Chest Pain: no  Shortness of Breath: no  Leg Pain/Discomfort: no    OBJECTIVE  Vitals  Temp:  [98 3 °F (36 8 °C)-102 9 °F (39 4 °C)] 98 3 °F (36 8 °C)  HR:  [] 90  Resp:  [20-52] 20  BP: ()/(42-86) 110/65       Intake/Output Summary (Last 24 hours) at 9/20/2019 0554  Last data filed at 9/20/2019 0300  Gross per 24 hour   Intake 3300 ml   Output 0 ml   Net 3300 ml       Physical Exam:   Physical Exam   Constitutional: She is active  She has a sickly appearance  Cardiovascular: Normal rate, regular rhythm, normal heart sounds and intact distal pulses  Pulmonary/Chest: Effort normal  No stridor  No respiratory distress  She has wheezes  Abdominal: Soft  Bowel sounds are normal  She exhibits no distension  There is no tenderness  Genitourinary:   Genitourinary Comments: r nephrostomy tube in place  Purewicks bedside catheter in place    Neurological: She is alert  Skin: Skin is warm and dry  Psychiatric: She has a normal mood and affect         Labs:   Recent Results (from the past 72 hour(s))   CBC and differential    Collection Time: 09/19/19  5:11 PM   Result Value Ref Range    WBC 16 15 (H) 4 31 - 10 16 Thousand/uL    RBC 2 67 (L) 3 81 - 5 12 Million/uL    Hemoglobin 7 5 (L) 11 5 - 15 4 g/dL    Hematocrit 22 4 (L) 34 8 - 46 1 %    MCV 84 82 - 98 fL    MCH 28 1 26 8 - 34 3 pg    MCHC 33 5 31 4 - 37 4 g/dL    RDW 16 2 (H) 11 6 - 15 1 %    MPV 10 9 8 9 - 12 7 fL    Platelets 184 (L) 630 - 390 Thousands/uL    nRBC 0 /100 WBCs   Comprehensive metabolic panel    Collection Time: 09/19/19  5:11 PM   Result Value Ref Range    Sodium 129 (L) 136 - 145 mmol/L    Potassium 2 9 (L) 3 5 - 5 3 mmol/L    Chloride 97 (L) 100 - 108 mmol/L    CO2 18 (L) 21 - 32 mmol/L    ANION GAP 14 (H) 4 - 13 mmol/L    BUN 37 (H) 5 - 25 mg/dL    Creatinine 1 91 (H) 0 60 - 1 30 mg/dL    Glucose 113 65 - 140 mg/dL    Calcium 8 6 8 3 - 10 1 mg/dL    AST 55 (H) 5 - 45 U/L    ALT 41 12 - 78 U/L    Alkaline Phosphatase 91 46 - 116 U/L    Total Protein 6 8 6 4 - 8 2 g/dL    Albumin 2 9 (L) 3 5 - 5 0 g/dL    Total Bilirubin 0 27 0 20 - 1 00 mg/dL    eGFR 28 ml/min/1 73sq m   Lactic acid x2    Collection Time: 09/19/19  5:11 PM   Result Value Ref Range    LACTIC ACID 0 7 0 5 - 2 0 mmol/L   Procalcitonin    Collection Time: 09/19/19  5:11 PM   Result Value Ref Range    Procalcitonin 4 39 (H) <=0 25 ng/ml   Protime-INR    Collection Time: 09/19/19  5:11 PM   Result Value Ref Range    Protime 32 9 (H) 11 6 - 14 5 seconds    INR 3 29 (H) 0 84 - 1 19   APTT    Collection Time: 09/19/19  5:11 PM   Result Value Ref Range    PTT 69 (H) 23 - 37 seconds   Manual Differential(PHLEBS Do Not Order)    Collection Time: 09/19/19  5:11 PM   Result Value Ref Range    Segmented % 60 43 - 75 %    Bands % 25 (H) 0 - 8 %    Lymphocytes % 4 (L) 14 - 44 %    Monocytes % 5 4 - 12 %    Eosinophils, % 0 0 - 6 %    Basophils % 0 0 - 1 %    Metamyelocytes% 3 (H) 0 - 1 %    Myelocytes % 2 (H) 0 - 1 %    Atypical Lymphocytes % 1 (H) <=0 %    Absolute Neutrophils 13 73 (H) 1 85 - 7 62 Thousand/uL    Lymphocytes Absolute 0 65 0 60 - 4 47 Thousand/uL    Monocytes Absolute 0 81 0 00 - 1 22 Thousand/uL    Eosinophils Absolute 0 00 0 00 - 0 40 Thousand/uL    Basophils Absolute 0 00 0 00 - 0 10 Thousand/uL    Total Counted      Toxic Granulation Present     RBC Morphology Present     Robb Cells Present     Ovalocytes Present     Poikilocytes Present     Polychromasia Present     Platelet Estimate Decreased (A) Adequate   UA w Reflex to Microscopic w Reflex to Culture    Collection Time: 09/19/19  5:31 PM   Result Value Ref Range    Color, UA Yellow     Clarity, UA Cloudy     Specific [place], UA 1 007 1 003 - 1 030    pH, UA 7 5 4 5, 5 0, 5 5, 6 0, 6 5, 7 0, 7 5, 8 0    Leukocytes, UA Small (A) Negative    Nitrite, UA Negative Negative    Protein,  (2+) (A) Negative mg/dl    Glucose, UA Negative Negative mg/dl    Ketones, UA Negative Negative mg/dl    Urobilinogen, UA 0 2 0 2, 1 0 E U /dl E U /dl    Bilirubin, UA Negative Negative    Blood, UA Large (A) Negative   Urine Microscopic    Collection Time: 09/19/19  5:31 PM   Result Value Ref Range    RBC, UA 20-30 (A) None Seen, 0-5 /hpf    WBC, UA 10-20 (A) None Seen, 0-5, 5-55, 5-65 /hpf    Epithelial Cells Occasional None Seen, Occasional /hpf    Bacteria, UA Moderate (A) None Seen, Occasional /hpf       Meds:  Scheduled Meds:  Current Facility-Administered Medications:  acetaminophen 650 mg Oral Q6H PRN Emperatriz Rosas PA-C   ARIPiprazole 10 mg Oral Daily James Bertrand, DO   aspirin 81 mg Oral Daily James Bertrand, DO   cefepime 2,000 mg Intravenous Q24H James Bertrand, DO   cholecalciferol 1,000 Units Oral Daily James Bertrand, DO   mupirocin  Topical TID James Bertrand, DO   oxybutynin 10 mg Oral Daily James Bertrand, DO   pantoprazole 40 mg Oral Early Morning James Bertrand, DO   rivaroxaban 20 mg Oral Daily With Breakfast James Bertrand, DO   sodium chloride (PF) 10 mL Intracatheter Daily James Berrtand, DO   venlafaxine 75 mg Oral TID James Bertrand, DO     Continuous Infusions:   PRN Meds:   acetaminophen    Imaging Studies: I have personally reviewed pertinent reports  EKG, Pathology, Microbiology, and Other Studies: I have personally reviewed pertinent reports        __________________    Signature / Title: Alessia Johns DO, Ob/Gyn, PGY-3  Date: 9/20/2019  Time: 5:54 AM

## 2019-09-20 NOTE — ASSESSMENT & PLAN NOTE
· POA, baseline approximately 0 6  · Suspect in setting of sepsis, poor p o  Intake  · Received 3 L NS in ED however unfortunately with evidence of volume overload    I will defer further IVF in this setting for now  · Has suprapubic catheter in place  · Will repeat BMP this a m   · Hold quinapril, renally dose medications otherwise

## 2019-09-20 NOTE — ASSESSMENT & PLAN NOTE
· Hemoglobin 7 5 on admission  · Will monitor with CBC, consider transfusion for hemoglobin less than 7 or symptomatic  · Given volume overload may need to consider dose of IV Lasix with any transfusion if given

## 2019-09-20 NOTE — CONSULTS
Consultation - Infectious Disease   Davi Soto 60 y o  female MRN: [id number]  Unit/Bed#: [facility] 912-01 Encounter: [id number]      IMPRESSION & RECOMMENDATIONS:     72-year-old female patient with endometrial cancer currently treated with chemotherapy through a right chest wall Port-A-Cath, presenting for fever and severe sepsis found to have Staph aureus bacteremia  1- severe sepsis, POA:  With fever, hypotension that responded to IVF, [place]  Sepsis is secondary to #2  - supportive care and IVF as per primary team  - continue antibiotics as below  - remove PORT-o-cath from right chest wall as it is likely infected      2- Staph aureus bacteremia and Port-A-Cath infection:  Blood culture collected on admission on 09/19/2019 is positive for Staph aureus in 1/1 set  Sensitivity of the Staph aureus is still in process  - continue vancomycin  Stop cefepime  - pharmacy consult for vancomycin dosing  - repeat blood culture daily until bacteremia clears  - please do not use port  Port needs to be removed  - check TTE  Patient will likely need TRINH to rule out infective endocarditis  - follow sensitivity of the Staph aureus and adjust antibiotics accordingly  - do not insert a new port until the repeat blood culture is negative and patient is persistently afebrile  - repeat CBC, close clinical follow-up and serial exams      3- Endometrial cancer:  Last chemotherapy received 09/09/2019  - oncology follow-up      4- acute kidney injury:  Creatinine 1 6 mg/dl today  -  consider Nephrology evaluation if no improvement  - repeat BMP    5- acute respiratory failure: Tachypnea noted physical exam   Chest x-ray showing vascular congestion  Patient reports improvement in her shortness of breath today  - monitor clinically    - diuresis as per primary team    Case discussed with primary team    HISTORY OF PRESENT ILLNESS:  Reason for Consult: MSSA bacteremia  HPI: Davi Soto is a 60y o  year old female with endometrial cancer  status post TAHBSO 12/20/2017, obstructive nephropathy status post right percutaneous nephrostomy, right side chest wall port insertion June 2019, currently on chemotherapy every 3 weeks, also with history of hypertension obesity dyslipidemia, now admitted 09/19/2019 for fever weakness and shortness of breath for 3 days duration  Fever reached 101 8 at home, upon presentation to ER patient was febrile at 102 1; she was also hypotensive with blood pressure 84/42 mmHg  And in [place]  Patient was diagnosed with severe sepsis; a blood culture was collected and patient was started on vancomycin cefepime  Today her blood culture collected from the port is growing Staph aureus  Patient of already reports improvement in the past 24 hours, but she did have fever of 102 this afternoon  Upon evaluation today, patient denies pain, she denies headache, denies nausea vomiting or diarrhea, denies dysuria, denies rash or arthralgia  Patient has chronic cough, she denies recent worsening she denies phlegm production  REVIEW OF SYSTEMS:  A complete 12 point system-based review of systems is negative other than that noted in the HPI      PAST MEDICAL HISTORY:  Past Medical History:   Diagnosis Date    Anemia     Chemotherapy follow-up examination     Depression     Diabetes mellitus ([facility] [address] )     Endometrial cancer ([facility] [address] ) 12/2017    Hyperglycemia     vx type 2 dm -- last assessed 4/1/14; resolved 11/7/17    Hyperlipidemia     Hypertension     Obesity     last assessed 10/14/17; resolved 11/7/17     Past Surgical History:   Procedure Laterality Date    ABDOMINAL SURGERY      GASTRIC BYPASS    CHOLECYSTECTOMY      at the time of gastric bypass    COLONOSCOPY      CT NEEDLE BIOPSY LYMPH NODE  7/8/2019    GASTRIC BYPASS      HYSTERECTOMY Bilateral     total abdominal with salpingo-oophorectomy    IR PORT PLACEMENT  7/26/2019    IR TUBE PLACEMENT NEPHROSTOMY  7/26/2019    OOPHORECTOMY Bilateral     MA LAP, RADICAL HYST W/ TUBE&OV, NODE BX N/A 2017    Procedure: HYSTERECTOMY LAPAROSCOPIC TOTAL ([address]) W/ ROBOTICS; BILATERAL SALPINGOOPHERECTOMY; LYMPH NODE DISSECTION; lysis of adhesions;  Surgeon: Sadia Martinez MD;  Location: [facility];  Service: Gynecology Oncology    MA LAP,DIAGNOSTIC ABDOMEN N/A 2017    Procedure: LAPAROSCOPY DIAGNOSTIC;  Surgeon: Sadia Martinez MD;  Location: [facility];  Service: Gynecology Oncology    TONSILLECTOMY      US GUIDED BREAST BIOPSY RIGHT COMPLETE Right 2019       FAMILY HISTORY:  Non-contributory    SOCIAL HISTORY:  Social History   Social History     Substance and Sexual Activity   Alcohol Use Not Currently     Social History     Substance and Sexual Activity   Drug Use No     Social History     Tobacco Use   Smoking Status Never Smoker   Smokeless Tobacco Never Used       ALLERGIES:  Allergies   Allergen Reactions    Cephalosporins Other (See Comments)     RASH - BILATERAL ARMS PER RN       MEDICATIONS:  All current active medications have been reviewed        PHYSICAL EXAM:  Temp:  [98 1 °F (36 7 °C)-102 9 °F (39 4 °C)] 102 °F (38 9 °C)  HR:  [] 100  Resp:  [18-52] 20  BP: ()/(52-86) 118/61  SpO2:  [88 %-100 %] 96 %  Temp (24hrs), Av 3 °F (37 9 °C), Min:98 1 °F (36 7 °C), Max:102 9 °F (39 4 °C)  Current: Temperature: (!) 102 °F (38 9 °C)    Intake/Output Summary (Last 24 hours) at 2019 1638  Last data filed at 2019 1508  Gross per 24 hour   Intake 3760 ml   Output 1100 ml   Net 2660 ml       General Appearance:  Appearing well, nontoxic, but uncomfortable   Head:  Normocephalic, without obvious abnormality, atraumatic   Eyes:  Conjunctiva pink and sclera anicteric, both eyes   Nose: Nares normal, mucosa normal, no drainage   Throat: Oropharynx moist without lesions   Neck: Supple, symmetrical, no adenopathy, no tenderness/mass/nodules   Back:   Symmetric, no curvature, ROM normal, no CVA tenderness   Lungs:   Clear to auscultation bilaterally, respirations unlabored   Chest Wall:  No tenderness or deformity   Heart:  RRR; no murmur, rub or gallop   Abdomen:   Soft, non-tender, non-distended, positive bowel sounds; right PCN in place  Extremities: No cyanosis, clubbing or edema   Skin: No rashes or lesions  No draining wounds noted  Erythema along the course of the port in right chest wall  Lymph nodes: Cervical, supraclavicular nodes normal   Neurologic: Alert and oriented times 3, extremity strength 5/5 and symmetric       LABS, IMAGING, & OTHER STUDIES:  Lab Results:  I have personally reviewed pertinent labs  Results from last 7 days   Lab Units 09/20/19  0515 09/19/19  1711   WBC Thousand/uL 11 45* 16 15*   HEMOGLOBIN g/dL 9 2* 7 5*   PLATELETS Thousands/uL 90* 131*     Results from last 7 days   Lab Units 09/20/19  0521 09/20/19  0515 09/19/19  1711   SODIUM mmol/L 132* 132* 129*   POTASSIUM mmol/L 2 8* 2 8* 2 9*   CHLORIDE mmol/L 103 103 97*   CO2 mmol/L 17* 18* 18*   BUN mg/dL 33* 34* 37*   CREATININE mg/dL 1 60* 1 60* 1 91*   EGFR ml/min/1 73sq m 34 34 28   CALCIUM mg/dL 8 1* 8 1* 8 6   AST U/L  --   --  55*   ALT U/L  --   --  41   ALK PHOS U/L  --   --  91     Results from last 7 days   Lab Units 09/19/19  1711   BLOOD CULTURE  Staphylococcus aureus*   GRAM STAIN RESULT  Gram positive cocci in clusters*     Results from last 7 days   Lab Units 09/20/19  0515 09/19/19  1711   PROCALCITONIN ng/ml 4 97* 4 39*       Imaging Studies:   I have personally reviewed pertinent imaging study reports and images in PACS  Other Studies:   I have personally reviewed pertinent reports

## 2019-09-20 NOTE — UTILIZATION REVIEW
Initial Clinical Review    Admission: Date/Time/Statement: Inpatient Admission Orders (From admission, onward)     Ordered        09/19/19 2141  Inpatient Admission (expected length of stay for this patient Order details is greater than two midnights)  Once                   Orders Placed This Encounter   Procedures    Inpatient Admission (expected length of stay for this patient Order details is greater than two midnights)     Standing Status:   Standing     Number of Occurrences:   1     Order Specific Question:   Admitting Physician     Answer:   Daisha Schultz [[id number]]     Order Specific Question:   Level of Care     Answer:   Med Surg [[id number]]     Order Specific Question:   Estimated length of stay     Answer:   More than 2 Midnights     Order Specific Question:   Certification     Answer:   I certify that inpatient services are medically necessary for this patient for a duration of greater than two midnights  See H&P and MD Progress Notes for additional information about the patient's course of treatment  ED Arrival Information     Expected Arrival Acuity Means of Arrival Escorted By Service Admission Type    - 9/19/2019 16:09 Emergent Ambulance [facility] Hospitalist Emergency    Arrival Complaint    Fever        Chief Complaint   Patient presents with    Fever - 9 weeks to 74 years     pt had chemo on 9/6/19, pt is weak, sob, coughing, and has fever     Assessment/Plan: 61 y/o female with PMHx significant for stage I B1 endometrial cancer dx'd in 2017 on chemo with Taxol and carboplatin, cancer-related pain, HLD, HTN, obesity, depression presents to ED with c/o fevers, generalized weakness, and sob with activity  Symptoms x several weeks, instructed to obtain CT as op but pt declined, fever started and was instructed to present to ED by physician  Also admits to diarrhea x 2 days with poor po intake  IN ED pt with T 101, tachycardic, tachypneic, WBC's 16 15, Hgb 7 5, K 2 9, BUN 37, Creat   1 91, blood cxs drawn, IV cefepime and vancomycin, 3L NSS IV bolus  CXR wet reading revealing evidence of pulmonary vascular congestion  On exam, (+) wheezes  Admit to M/S/Tele unit with Sepsis  Tele monitoring, O2 3-4 lpm nc, repeat procalcitonin, stool for c-diff, trend WBC's, temp, Lasix 20 mg IV, repeat BMP, last chemo 9/9, chemo on hold currently, replace K and monitor, hold ACE    9/20 --- still with sob, but improving    Continue O2 (currently on 4 lpm nc), IV abx, IVF    Intake/Output Summary (Last 24 hours) at 9/20/2019 0554  Last data filed at 9/20/2019 0300      Gross per 24 hour   Intake 3300 ml   Output 0 ml   Net 3300 ml         ED Triage Vitals [09/19/19 1626]   Temperature Pulse Respirations Blood Pressure SpO2   (!) 101 °F (38 3 °C) 105 20 (!) 88/46 95 %      Temp Source Heart Rate Source Patient Position - Orthostatic VS BP Location FiO2 (%)   Oral Monitor Sitting Left arm --      Pain Score       2        Wt Readings from Last 1 Encounters:   09/20/19 92 kg (202 lb 14 4 oz)     Additional Vital Signs:   /Time  Temp  Pulse  Resp  BP  MAP (mmHg)  SpO2  O2 Device   09/20/19 11:07:25  98 4 °F (36 9 °C)  95    115/53  74  99 %     09/20/19 07:38:01  98 1 °F (36 7 °C)  80  18  98/54  69  96 %     09/20/19 02:03:48  98 3 °F (36 8 °C)  90  20  110/65  80  99 %     09/20/19 00:18:44  102 1 °F (38 9 °C)Abnormal   99        100 %     09/19/19 22:39:08  102 9 °F (39 4 °C)Abnormal   108Abnormal   24Abnormal   113/62  79  100 %     09/19/19 2145    106Abnormal   47Abnormal   122/62    95 %  Nasal cannula   09/19/19 2100    109Abnormal   46Abnormal   147/66    100 %  None (Room air)   09/19/19 2045    112Abnormal   48Abnormal       99 %     09/19/19 2030    112Abnormal   45Abnormal   121/57    91 %  None (Room air)   09/19/19 2015    106Abnormal   52Abnormal   117/56    88 %Abnormal   None (Room air)   09/19/19 2000    104  32Abnormal   114/56    89 %Abnormal   None (Room air)   09/19/19 1700   106Abnormal   41Abnormal   94/52  68  96 %     09/19/19 1630    105  39Abnormal   84/42Abnormal   52  96 %     09/19/19 1626  101 °F (38 3 °C)Abnormal   105  20  88/46Abnormal     95 %  None (Room air)       Pertinent Labs/Diagnostic Test Results:   Results from last 7 days   Lab Units 09/20/19  0515 09/19/19  1711   WBC Thousand/uL 11 45* 16 15*   HEMOGLOBIN g/dL 9 2* 7 5*   HEMATOCRIT % 28 1* 22 4*   PLATELETS Thousands/uL 90* 131*   BANDS PCT %  --  25*     Results from last 7 days   Lab Units 09/20/19  0521 09/20/19  0515 09/19/19  1711   SODIUM mmol/L 132* 132* 129*   POTASSIUM mmol/L 2 8* 2 8* 2 9*   CHLORIDE mmol/L 103 103 97*   CO2 mmol/L 17* 18* 18*   ANION GAP mmol/L 12 11 14*   BUN mg/dL 33* 34* 37*   CREATININE mg/dL 1 60* 1 60* 1 91*   EGFR ml/min/1 73sq m 34 34 28   CALCIUM mg/dL 8 1* 8 1* 8 6   MAGNESIUM mg/dL 1 5*  --   --      Results from last 7 days   Lab Units 09/19/19  1711   AST U/L 55*   ALT U/L 41   ALK PHOS U/L 91   TOTAL PROTEIN g/dL 6 8   ALBUMIN g/dL 2 9*   TOTAL BILIRUBIN mg/dL 0 27     Results from last 7 days   Lab Units 09/20/19  0521 09/20/19  0515 09/19/19  1711   GLUCOSE RANDOM mg/dL 98 99 113     Results from last 7 days   Lab Units 09/19/19  1711   PROTIME seconds 32 9*   INR  3 29*   PTT seconds 69*     Results from last 7 days   Lab Units 09/20/19  0515 09/19/19  1711   PROCALCITONIN ng/ml 4 97* 4 39*     Results from last 7 days   Lab Units 09/19/19  1711   LACTIC ACID mmol/L 0 7     Results from last 7 days   Lab Units 09/19/19  1731   CLARITY UA  Cloudy   COLOR UA  Yellow   SPEC GRAV UA  1 007   PH UA  7 5   GLUCOSE UA mg/dl Negative   KETONES UA mg/dl Negative   BLOOD UA  Large*   PROTEIN UA mg/dl 100 (2+)*   NITRITE UA  Negative   BILIRUBIN UA  Negative   UROBILINOGEN UA E U /dl 0 2   LEUKOCYTES UA  Small*   WBC UA /hpf 10-20*   RBC UA /hpf 20-30*   BACTERIA UA /hpf Moderate*   EPITHELIAL CELLS WET PREP /hpf Occasional     Results from last 7 days   Lab Units 09/19/19  1711   BLOOD CULTURE  Staphylococcus aureus*   GRAM STAIN RESULT  Gram positive cocci in clusters*     CXR 9/20 -- Findings of volume overload with pulmonary vascular congestion and mild interstitial edema   No consolidation      ED Treatment:   Medication Administration from 09/19/2019 1608 to 09/19/2019 2235       Date/Time Order Dose Route Action     09/19/2019 1716 sodium chloride 0 9 % bolus 1,000 mL 1,000 mL Intravenous New Bag     09/19/2019 1841 sodium chloride 0 9 % bolus 1,000 mL 1,000 mL Intravenous New Bag     09/19/2019 1929 sodium chloride 0 9 % bolus 1,000 mL 1,000 mL Intravenous New Bag     09/19/2019 1829 cefepime (MAXIPIME) 2 g/50 mL dextrose IVPB 2,000 mg Intravenous New Bag     09/19/2019 1928 vancomycin (VANCOCIN) 1,250 mg in sodium chloride 0 9 % 250 mL IVPB 1,250 mg Intravenous New Bag     09/19/2019 1932 potassium chloride (K-DUR,KLOR-CON) CR tablet 40 mEq 40 mEq Oral Given     09/19/2019 2218 furosemide (LASIX) injection 20 mg 20 mg Intravenous Given     Past Medical History:   Diagnosis Date    Anemia     Chemotherapy follow-up examination     Depression     Diabetes mellitus ([facility] [address] )     [facility]ometrial cancer ([facility] ) 12/2017    Hyperglycemia     vx type 2 dm -- last assessed 4/1/14; resolved 11/7/17    Hyperlipidemia     Hypertension     Obesity     last assessed 10/14/17; resolved 11/7/17     Present on Admission:   Benign essential hypertension   Acute deep vein thrombosis (DVT) of proximal vein of lower extremity (HCC)   Endometrial cancer (HCC)   Sepsis ([facility] [address] )   Acute kidney injury ([facility] )   Volume overload   Hypokalemia   Hyponatremia   Anemia due to chemotherapy      Admitting Diagnosis: Fever [R50 9]  Sepsis, due to unspecified organism [facility]) [A41 9]  Age/Sex: 60 y o  female  Admission Orders:  Current Facility-Administered Medications:  acetaminophen 650 mg Oral Q6H PRN 9/20 x2   ARIPiprazole 10 mg Oral Daily   aspirin 81 mg Oral Daily   cholecalciferol 1,000 Units Oral Daily   mupirocin  Topical TID   oxybutynin 10 mg Oral Daily   pantoprazole 40 mg Oral Early Morning   potassium chloride 40 mEq Oral Once   potassium chloride 40 mEq Intravenous Once   rivaroxaban 20 mg Oral Daily With Breakfast   sodium chloride (PF) 10 mL Intracatheter Daily   sodium chloride 75 mL/hr Intravenous Continuous   vancomycin 15 mg/kg (Adjusted) Intravenous Once PRN   venlafaxine 75 mg Oral TID     SCD's  Reg diet  I/O    IP CONSULT TO INFECTIOUS DISEASES  IP CONSULT TO PHARMACY  IP CONSULT TO PHARMACY  IP CONSULT TO SURGICAL ONCOLOGY    Network Utilization Review Department  Phone: [phone]; Fax [phone]  [email or web address]  ATTENTION: Please call with any questions or concerns to [phone]  and carefully listen to the prompts so that you are directed to the right person  Send all requests for admission clinical reviews, approved or denied determinations and any other requests to fax [phone]   All voicemails are confidential

## 2019-09-21 ENCOUNTER — APPOINTMENT (INPATIENT)
Dept: NON INVASIVE DIAGNOSTICS | Facility: HOSPITAL | Age: 64
DRG: 314 | End: 2019-09-21
Payer: COMMERCIAL

## 2019-09-21 LAB
ANION GAP SERPL CALCULATED.3IONS-SCNC: 11 MMOL/L (ref 4–13)
BASOPHILS # BLD AUTO: 0.06 THOUSANDS/ΜL (ref 0–0.1)
BASOPHILS NFR BLD AUTO: 0 % (ref 0–1)
BUN SERPL-MCNC: 22 MG/DL (ref 5–25)
CALCIUM SERPL-MCNC: 8.8 MG/DL (ref 8.3–10.1)
CHLORIDE SERPL-SCNC: 103 MMOL/L (ref 100–108)
CO2 SERPL-SCNC: 18 MMOL/L (ref 21–32)
CREAT SERPL-MCNC: 0.96 MG/DL (ref 0.6–1.3)
EOSINOPHIL # BLD AUTO: 0.01 THOUSAND/ΜL (ref 0–0.61)
EOSINOPHIL NFR BLD AUTO: 0 % (ref 0–6)
ERYTHROCYTE [DISTWIDTH] IN BLOOD BY AUTOMATED COUNT: 16.2 % (ref 11.6–15.1)
GFR SERPL CREATININE-BSD FRML MDRD: 64 ML/MIN/1.73SQ M
GLUCOSE SERPL-MCNC: 94 MG/DL (ref 65–140)
HCT VFR BLD AUTO: 21 % (ref 34.8–46.1)
HGB BLD-MCNC: 7 G/DL (ref 11.5–15.4)
IMM GRANULOCYTES # BLD AUTO: >0.5 THOUSAND/UL (ref 0–0.2)
IMM GRANULOCYTES NFR BLD AUTO: 8 % (ref 0–2)
LYMPHOCYTES # BLD AUTO: 1.17 THOUSANDS/ΜL (ref 0.6–4.47)
LYMPHOCYTES NFR BLD AUTO: 7 % (ref 14–44)
MCH RBC QN AUTO: 27.8 PG (ref 26.8–34.3)
MCHC RBC AUTO-ENTMCNC: 33.3 G/DL (ref 31.4–37.4)
MCV RBC AUTO: 83 FL (ref 82–98)
MONOCYTES # BLD AUTO: 1.51 THOUSAND/ΜL (ref 0.17–1.22)
MONOCYTES NFR BLD AUTO: 9 % (ref 4–12)
NEUTROPHILS # BLD AUTO: 13.52 THOUSANDS/ΜL (ref 1.85–7.62)
NEUTS SEG NFR BLD AUTO: 76 % (ref 43–75)
NRBC BLD AUTO-RTO: 0 /100 WBCS
PLATELET # BLD AUTO: 89 THOUSANDS/UL (ref 149–390)
PMV BLD AUTO: 12.2 FL (ref 8.9–12.7)
POTASSIUM SERPL-SCNC: 3.2 MMOL/L (ref 3.5–5.3)
RBC # BLD AUTO: 2.52 MILLION/UL (ref 3.81–5.12)
SODIUM SERPL-SCNC: 132 MMOL/L (ref 136–145)
WBC # BLD AUTO: 17.59 THOUSAND/UL (ref 4.31–10.16)

## 2019-09-21 PROCEDURE — 99024 POSTOP FOLLOW-UP VISIT: CPT | Performed by: RADIOLOGY

## 2019-09-21 PROCEDURE — 94760 N-INVAS EAR/PLS OXIMETRY 1: CPT

## 2019-09-21 PROCEDURE — 99233 SBSQ HOSP IP/OBS HIGH 50: CPT | Performed by: INTERNAL MEDICINE

## 2019-09-21 PROCEDURE — 93306 TTE W/DOPPLER COMPLETE: CPT

## 2019-09-21 PROCEDURE — 85025 COMPLETE CBC W/AUTO DIFF WBC: CPT | Performed by: HOSPITALIST

## 2019-09-21 PROCEDURE — 80048 BASIC METABOLIC PNL TOTAL CA: CPT | Performed by: HOSPITALIST

## 2019-09-21 PROCEDURE — 99232 SBSQ HOSP IP/OBS MODERATE 35: CPT | Performed by: HOSPITALIST

## 2019-09-21 PROCEDURE — 99233 SBSQ HOSP IP/OBS HIGH 50: CPT | Performed by: OBSTETRICS & GYNECOLOGY

## 2019-09-21 PROCEDURE — 93306 TTE W/DOPPLER COMPLETE: CPT | Performed by: INTERNAL MEDICINE

## 2019-09-21 PROCEDURE — 87040 BLOOD CULTURE FOR BACTERIA: CPT | Performed by: INTERNAL MEDICINE

## 2019-09-21 PROCEDURE — 87147 CULTURE TYPE IMMUNOLOGIC: CPT | Performed by: INTERNAL MEDICINE

## 2019-09-21 RX ORDER — POTASSIUM CHLORIDE 20 MEQ/1
40 TABLET, EXTENDED RELEASE ORAL ONCE
Status: COMPLETED | OUTPATIENT
Start: 2019-09-21 | End: 2019-09-21

## 2019-09-21 RX ORDER — POTASSIUM CHLORIDE 20MEQ/15ML
20 LIQUID (ML) ORAL ONCE
Status: COMPLETED | OUTPATIENT
Start: 2019-09-21 | End: 2019-09-21

## 2019-09-21 RX ADMIN — ARIPIPRAZOLE 10 MG: 10 TABLET ORAL at 10:24

## 2019-09-21 RX ADMIN — RIVAROXABAN 20 MG: 20 TABLET, FILM COATED ORAL at 10:25

## 2019-09-21 RX ADMIN — POTASSIUM CHLORIDE 20 MEQ: 1500 TABLET, EXTENDED RELEASE ORAL at 10:25

## 2019-09-21 RX ADMIN — VENLAFAXINE 75 MG: 37.5 TABLET ORAL at 17:51

## 2019-09-21 RX ADMIN — VITAMIN D, TAB 1000IU (100/BT) 1000 UNITS: 25 TAB at 10:25

## 2019-09-21 RX ADMIN — ASPIRIN 81 MG: 81 TABLET, COATED ORAL at 10:25

## 2019-09-21 RX ADMIN — MUPIROCIN 1 APPLICATION: 20 OINTMENT TOPICAL at 10:24

## 2019-09-21 RX ADMIN — PANTOPRAZOLE SODIUM 40 MG: 40 TABLET, DELAYED RELEASE ORAL at 05:33

## 2019-09-21 RX ADMIN — SODIUM CHLORIDE 10 ML: 9 INJECTION, SOLUTION INTRAMUSCULAR; INTRAVENOUS; SUBCUTANEOUS at 12:00

## 2019-09-21 RX ADMIN — VENLAFAXINE 75 MG: 37.5 TABLET ORAL at 20:34

## 2019-09-21 RX ADMIN — BENZONATATE 200 MG: 100 CAPSULE ORAL at 20:33

## 2019-09-21 RX ADMIN — OXYBUTYNIN CHLORIDE 10 MG: 5 TABLET, EXTENDED RELEASE ORAL at 10:25

## 2019-09-21 RX ADMIN — ACETAMINOPHEN 650 MG: 325 TABLET ORAL at 03:12

## 2019-09-21 RX ADMIN — BENZONATATE 200 MG: 100 CAPSULE ORAL at 13:30

## 2019-09-21 RX ADMIN — VANCOMYCIN HYDROCHLORIDE 750 MG: 750 INJECTION, SOLUTION INTRAVENOUS at 20:27

## 2019-09-21 RX ADMIN — POTASSIUM CHLORIDE 20 MEQ: 20 SOLUTION ORAL at 11:52

## 2019-09-21 RX ADMIN — VANCOMYCIN HYDROCHLORIDE 750 MG: 750 INJECTION, SOLUTION INTRAVENOUS at 10:25

## 2019-09-21 RX ADMIN — VENLAFAXINE 75 MG: 37.5 TABLET ORAL at 10:24

## 2019-09-21 NOTE — ASSESSMENT & PLAN NOTE
· Staph aureus bacteremia  · Repeat BC 9/20 positive  · Port removed - pocket looked infected  · consulted ID & pharmacy - IV Vanco  · Repeat cultures  · Check TTE

## 2019-09-21 NOTE — ASSESSMENT & PLAN NOTE
· Suspected by the admitting physician at the time of evaluation due to dyspnea, tachypnea, hypoxia  · Received 3 L of fluid for sepsis followed by 20 mg of IV Lasix  · No longer appears volume overload, mild tachypnea - hold IVF  · Hold off on further diuresis - use prn

## 2019-09-21 NOTE — ASSESSMENT & PLAN NOTE
· Evidence upon admission with tachypnea, dyspnea, hypoxia after IV fluid resuscitation for sepsis  · Stairs with 20 mg IV Lasix  · Appears better today  · Wean O2  · Hold off on further diuresis - use Prn

## 2019-09-21 NOTE — PROGRESS NOTES
POD 1 from right chest port removal    Port pocket site intact without purulence  Some purulence at IJ access site, expressed manually    Nephrostomy examined, some skin breakdown but this is connected to a very narrow caliber tubing    Recommend  - dressing change  - probable drain removal tomorrow  - stop  Flushing drain  - use standard size tubing for nephrostomy drain tubing

## 2019-09-21 NOTE — PROGRESS NOTES
Progress Note - Jen Edgarkareni 1955, 60 y o  female MRN: [id number]    Unit/Bed#: [facility] 912-01 Encounter: [id number]    Primary Care Provider: Carlee Bryson DO   Date and time admitted to hospital: 9/19/2019  4:09 PM        Acute respiratory failure [facility])  Assessment & Plan  · Evidence upon admission with tachypnea, dyspnea, hypoxia after IV fluid resuscitation for sepsis  · Stairs with 20 mg IV Lasix  · Appears better today  · Wean O2  · Hold off on further diuresis - use Prn    Bacteremia  Assessment & Plan  · Staph aureus bacteremia  · Repeat BC 9/20 positive  · Port removed - pocket looked infected  · consulted ID & pharmacy - IV Vanco  · Repeat cultures  · Check TTE    Hyponatremia  Assessment & Plan  · Sodium 129 on admission  · Hypovolemic hypernatremia  · Status is IV fluids followed by Lasix  · Now 132  · monitor    Hypokalemia  Assessment & Plan  · K 2 9  · After repletion, now 3 2, replete today    Volume overload  Assessment & Plan  · Suspected by the admitting physician at the time of evaluation due to dyspnea, tachypnea, hypoxia  · Received 3 L of fluid for sepsis followed by 20 mg of IV Lasix  · No longer appears volume overload, mild tachypnea - hold IVF  · Hold off on further diuresis - use prn    Acute kidney injury ([address] )  Assessment & Plan  · POA, baseline approximately 0 6  · Suspect in setting of sepsis, poor p o   Intake  · Received 3 L NS in ED however unfortunately with evidence of volume overload also got some lasix  · Cr then 1 6  · S/p further IVF - now 0 9, stop all IVF with tachypnea  · Hold quinapril, renally dose medications otherwise    Anemia due to chemotherapy  Assessment & Plan  · Hemoglobin 7 5 on admission, repeat 9 2 - again 7 0 - if drops further consider PRBC    Acute deep vein thrombosis (DVT) of proximal vein of lower extremity ([address] )  Assessment & Plan  · On Eliquis for this    Endometrial cancer [facility])  Assessment & Plan  · [address] Gynecologic Oncology evaluation, she received Taxol/carboplatin and received last chemotherapy 09/09/2019  · Received granix 08/31/2019, also Neulasta 09/09/2019 and 09/16/2019  · Chemotherapy to be on hold for now given acute infection    Benign essential hypertension  Assessment & Plan  · Holding quinapril for now given acute kidney injury  · Blood pressure monitoring per protocol    * Sepsis ([facility] [address] )  Assessment & Plan  · POA with fevers, leukocytosis, tachycardia  Elevated procalcitonin on admission however with normal lactic acid level  · 2/2 BC growing staph aureus  · Cont IV vanco, ID consutled  · Port a cath remnoved by IR  · TTE performed - if neg, may need TRINH  · Repeat [facility] 9/20 are positive  · Check cdiff if diarrhea  · F/u urine cultures  · Repeat procalcitonin in a m  [facility] Internal Medicine Progress Note  Patient: [facility] 60 y o  female   MRN: [id number]  PCP: Yovany Davidson DO  [facility] Encounter: [id number]  Date Of Visit: 09/21/19    Assessment:    Principal Problem:    Sepsis ([facility] [address] )  Active Problems:    Benign essential hypertension    Endometrial cancer ([facility] [address] )    Acute deep vein thrombosis (DVT) of proximal vein of lower extremity ([facility] [address] )    Anemia due to chemotherapy    Acute kidney injury ([facility] [address] )    Volume overload    Hypokalemia    Hyponatremia    Bacteremia    Acute respiratory failure ([facility] [address] )      VTE Pharmacologic Prophylaxis:   Pharmacologic: Apixaban (Eliquis)  Mechanical VTE Prophylaxis in Place: Yes    Patient Centered Rounds: I have performed bedside rounds with nursing staff today  Discussions with Specialists or Other Care Team Provider:     Education and Discussions with Family / Patient: patinet    Time Spent for Care: 30 minutes  More than 50% of total time spent on counseling and coordination of care as described above      Current Length of Stay: 2 day(s)    Current Patient Status: Inpatient   Certification Statement: The patient will continue to require additional inpatient hospital stay due to not ready    Discharge Plan / Estimated Discharge Date:     Code Status: Level 1 - Full Code      Subjective:   Doesn't feel good overall, unable to perform minimal activities due to SOB, fatigue, sides hurt    Objective:     Vitals:   Temp (24hrs), Av 5 °F (37 5 °C), Min:97 9 °F (36 6 °C), Max:102 °F (38 9 °C)    Temp:  [97 9 °F (36 6 °C)-102 °F (38 9 °C)] 98 6 °F (37 °C)  HR:  [] 94  Resp:  [18-22] 18  BP: ()/(53-64) 111/61  SpO2:  [95 %-100 %] 100 %  Body mass index is 40 34 kg/m²  Input and Output Summary (last 24 hours): Intake/Output Summary (Last 24 hours) at 2019 1400  Last data filed at 2019 1314  Gross per 24 hour   Intake 100 ml   Output 3760 ml   Net -3660 ml       Physical Exam:     Physical Exam   Constitutional: She is oriented to person, place, and time  She appears well-developed and well-nourished  HENT:   Head: Normocephalic and atraumatic  Mouth/Throat: Oropharynx is clear and moist    Eyes: Conjunctivae are normal    Cardiovascular: Normal rate  Exam reveals no friction rub  No murmur heard  Pulmonary/Chest: Effort normal and breath sounds normal  No stridor  No respiratory distress  Abdominal: Soft  She exhibits no distension  There is no tenderness  Neurological: She is alert and oriented to person, place, and time  Vitals reviewed          Additional Data:     Labs:    Results from last 7 days   Lab Units 19  0535   WBC Thousand/uL 17 59*   HEMOGLOBIN g/dL 7 0*   HEMATOCRIT % 21 0*   PLATELETS Thousands/uL 89*   NEUTROS PCT % 76*   LYMPHS PCT % 7*   MONOS PCT % 9   EOS PCT % 0     Results from last 7 days   Lab Units 19  0535  19  1711   POTASSIUM mmol/L 3 2*   < > 2 9*   CHLORIDE mmol/L 103   < > 97*   CO2 mmol/L 18*   < > 18*   BUN mg/dL 22   < > 37*   CREATININE mg/dL 0 96   < > 1 91*   CALCIUM mg/dL 8 8   < > 8 6   ALK PHOS U/L  --   --  91   ALT U/L  --   --  41   AST U/L  --   --  55*    < > = values in this interval not displayed  Results from last 7 days   Lab Units 09/20/19  1742   INR  2 10*       * I Have Reviewed All Lab Data Listed Above  * Additional Pertinent Lab Tests Reviewed: All Labs Within Last 24 Hours Reviewed    Imaging:    Imaging Reports Reviewed Today Include:   Imaging Personally Reviewed by Myself Includes:      Recent Cultures (last 7 days):     Results from last 7 days   Lab Units 09/20/19  1058 09/20/19  1057 09/19/19  1731 09/19/19  1711   BLOOD CULTURE  Staphylococcus aureus* Staphylococcus aureus*  --  Staphylococcus aureus*   GRAM STAIN RESULT  Gram positive cocci in clusters* Gram positive cocci in clusters*  --  Gram positive cocci in clusters*   URINE CULTURE   --   --  60,000-69,000 cfu/ml Staphylococcus coagulase negative*  --        Last 24 Hours Medication List:     Current Facility-Administered Medications:  acetaminophen 650 mg Oral Q6H PRN Emperatriz Rosas, PA-WALDEMAR    ARIPiprazole 10 mg Oral Daily Paullette Solum, DO    aspirin 81 mg Oral Daily Paullette Solum, DO    benzonatate 200 mg Oral TID PRN DANIEL Savage-WALDEMAR    cholecalciferol 1,000 Units Oral Daily Paullette Solum, DO    mupirocin  Topical TID Paullette Solum, DO    oxybutynin 10 mg Oral Daily Paullette Solum, DO    pantoprazole 40 mg Oral Early Morning Paullette Solum, DO    rivaroxaban 20 mg Oral Daily With Breakfast Paullette Solum, DO    sodium chloride (PF) 10 mL Intracatheter Daily Paullette Solum, DO    vancomycin 12 5 mg/kg (Order-Specific) Intravenous Q12H Porfirio Valladares MD Last Rate: 750 mg (09/21/19 1025)   venlafaxine 75 mg Oral TID Paullette Solum, DO         Today, Patient Was Seen By: Porfirio Valladares MD    ** Please Note: This note has been constructed using a voice recognition system   **

## 2019-09-21 NOTE — ASSESSMENT & PLAN NOTE
· Sodium 129 on admission  · Hypovolemic hypernatremia  · Status is IV fluids followed by Lasix  · Now 132  · monitor

## 2019-09-21 NOTE — RESPIRATORY THERAPY NOTE
resp care      09/21/19 0732   Respiratory Assessment   Assessment Type Assess only   General Appearance Alert; Awake   Respiratory Pattern Normal   Chest Assessment Chest expansion symmetrical   Bilateral Breath Sounds Clear;Diminished   Cough Dry;Non-productive   Resp Comments no resp meds at home, d/c'd resp protocol, no indication  (pt does not wear 02 at home, pt taken off 02 will monitor)   O2 Device nc

## 2019-09-21 NOTE — PROGRESS NOTES
Dr Nj Isabel notified re: pt coughing when eating  Ordered to make pt NPO and speech swallow eval ordered for tomorrow  Pt is comfortable at this time and is sating 96% on 1 liter O2

## 2019-09-21 NOTE — ASSESSMENT & PLAN NOTE
· POA, baseline approximately 0 6  · Suspect in setting of sepsis, poor p o   Intake  · Received 3 L NS in ED however unfortunately with evidence of volume overload also got some lasix  · Cr then 1 6  · S/p further IVF - now 0 9, stop all IVF with tachypnea  · Hold quinapril, renally dose medications otherwise

## 2019-09-21 NOTE — ASSESSMENT & PLAN NOTE
· POA with fevers, leukocytosis, tachycardia  Elevated procalcitonin on admission however with normal lactic acid level  · 2/2 BC growing staph aureus  · Cont IV vanco, ID consutled  · Port a cath remnoved by IR  · TTE performed - if neg, may need TRINH  · Repeat [facility] 9/20 are positive  · Check cdiff if diarrhea  · F/u urine cultures  · Repeat procalcitonin in a m

## 2019-09-21 NOTE — PROGRESS NOTES
Vancomycin IV Pharmacy-to-Dose Consultation    Jammie Grijalva is a 60 y o  female who is currently receiving Vancomycin IV with management by the Pharmacy Consult service  Relevant clinical data and objective / subjective history reviewed  Vancomycin Assessment:  Indication: MRSA suspected, bacteremia s/p port-a-cath removal 9/20  Status: fever trending down; VSS; WBC 16>11>17  Micro: Staph aureus from 1:1 blood cx on 9/19; repeats on 9/20 and 9/21 NGTD; port tip cx from 9/20 NGTD  Procalcitonin: n/a for Staph  aureus bacteremia  Renal Function: rapid improvement in [place] from admission with renal function now back near baseline SCr 1 91>1 6>0 96 (CrCl 50s; UOP adequate)  Potential Nephrotoxicity Factors (select ALL that apply):  Medications: none  Patient-Factors: elderly, renal dysfunction (resolved today)  Days of Therapy: 3  Current Dose: 1000mg IV PRN when level < 20 (pulse-dosing) (last dose: 9/20 @ 2153)  Goal Trough: 15-20 (appropriate for most indications)   Goal AUC(24h): 400-600  Last Level: 9 5 random level last night after 1 dose of 1250mg ~22hr prior  Pharmacokinetic Analysis: patient's renal function has rapidly improved, which will require empiric increase to the Vanco dose this AM      Vancomycin Plan:  New Dosing: change to 750mg (12 5mg/kg AdjBW) IV Q12H (next dose: 0900 today)  Next Level: p/t 4th new dose on 9/22 (Sun) at 2030  Renal Function Monitoring: at least 2x/wk or whenever level obtained      Pharmacy will continue to follow closely for s/sx of nephrotoxicity, infusion reactions and appropriateness of therapy  BMP and CBC will be ordered per protocol  We will continue to follow the patients culture results and clinical progress daily  Kartik Saenz, Pharm  D , [facility]  Clinical Pharmacist, [address] [phone] or Jessy

## 2019-09-21 NOTE — SEDATION DOCUMENTATION
Right Chest Port removed by Dr Sandra Alatorre  6 fr drain tube placed in pocket  DSD applied  Transport back to [facility] via stretcher  Port catheter tip sent for culture

## 2019-09-21 NOTE — PROGRESS NOTES
Gyn Oncology Progress note   Navarro Smoker Pulcini 60 y o  female MRN: [id number]  Unit/Bed#: [facility] 912-01 Encounter: [id number]    ASSESS:  65-year-old with recurrent stage I B1 endometrial cancer with biopsy-proven evidence of right pelvic sidewall recurrence, on  Carbo/taxol/neulasta, with suspected sepsis 2/2 port-a-cath     PLAN:     1  Sepsis:    In the setting of fevers, leukocytosis, tachycardia, elevated procalcitonin (4 97ng/mL)  Normal lactic acid (0 7mmol/L)  Suspected nidus: port-a-cath, s/p IR removal 9/20  Blood cultures pending, C diff pending  UCx: 60 - 69K Staph coagulase negative  CXR: Findings of volume overload with pulmonary vascular congestion and mild interstitial edema  No consolidation  WBC: 11 45 > 15 59  Temp improving - last febrile episode 9/20 at 1517 (102F), currently afebrile  Continue vancomycin, discontinued cefepime 2/2 ID recommendations      2  Endometrial cancer:    confirmed right pelvic side wall recurrence, on carbo/taxol/neulasta-last tx 9/16     3  Acute kidney injury, improving:    Elevated creatinine 1 91mg/dL > 1 60 > 0 96  Right nephrostomy tube in place, hold ACE-inhibitor     4  Volume overload: In the setting of IV fluids per sepsis protocol, s/p dose of lasix for diuresis; 3275mL urine out in past 24h  ECHO 9/20, formal read pending     5  Anemia: In the setting of ongoing chemotherapy, Hb 9 2 > 7 0  Consider transfusion if hemoglobin drops below 7     6  Hypertension:  Home ACE inhibitor held    No acute events overnight  Pain is well controlled  Pt is tolerating PO but reports decreased appetite - low desire to eat regular diet but amenable to ensure supplementation  Pt is passing flatus  Pt is ambulating  Denies fevers/chills and reports improvement in overall symptoms following removal of port-a-cath        /53   Pulse 78   Temp 97 9 °F (36 6 °C)   Resp 18   Wt 90 6 kg (199 lb 11 8 oz)   LMP  (LMP Unknown)   SpO2 100%   BMI 40 34 kg/m² I/O last 3 completed shifts: In: 4148 [P O :360; IV Piggyback:2350]  Out: 0260 [Urine:3275; Drains:35]  No intake/output data recorded  Lab Results   Component Value Date    WBC 17 59 (H) 09/21/2019    HGB 7 0 (L) 09/21/2019    HCT 21 0 (L) 09/21/2019    MCV 83 09/21/2019    PLT 89 (L) 09/21/2019       Lab Results   Component Value Date    GLUCOSE 219 (H) 12/19/2017    CALCIUM 8 8 09/21/2019     10/27/2017    K 3 2 (L) 09/21/2019    CO2 18 (L) 09/21/2019     09/21/2019    BUN 22 09/21/2019    CREATININE 0 96 09/21/2019       Lab Results   Component Value Date/Time    POCGLU 142 (H) 07/26/2019 09:33 AM    POCGLU 107 07/09/2019 11:48 AM    POCGLU 153 (H) 07/09/2019 08:16 AM    POCGLU 100 07/08/2019 08:57 PM    POCGLU 141 (H) 07/08/2019 04:46 PM       Physical Exam  Physical Exam   Constitutional: She is oriented to person, place, and time  She is cooperative  Non-toxic appearance  No distress  Cardiovascular: Normal rate, regular rhythm, normal heart sounds and intact distal pulses  Pulmonary/Chest: Effort normal  No respiratory distress  She has rales  She exhibits no tenderness  Abdominal: Soft  She exhibits no distension  There is no tenderness  There is no guarding  Neurological: She is alert and oriented to person, place, and time  She displays normal reflexes  She exhibits normal muscle tone  Skin: Skin is warm and dry  She is not diaphoretic  No erythema  No pallor  Psychiatric: She has a normal mood and affect   Her behavior is normal  Judgment and thought content normal          Deny Booker MD  OB/GYN PGY-4  9/21/2019 7:54 AM

## 2019-09-21 NOTE — PROGRESS NOTES
Progress Note - Infectious Disease   Jen Melendez 60 y o  female MRN: [id number]  Unit/Bed#: [facility] 912-01 Encounter: [id number]      Impression/Recommendations:  1- severe sepsis,  with septic shock, POA:  Source is most likely staph aureus bacteremia  Patient is clinically much improved with IV antibiotic and removal of PAC  Hypotension has resolved  Patient is not on pressors  - antibiotic plan as in below  - monitor temperature/WBC  - monitor hemodynamics        2- Staph aureus bacteremia:  Source is most likely PAC infection  PAC has been removed  Patient will need endocarditis workup  - continue IV vancomycin for now  - follow-up on susceptibility of Staph aureus in blood culture  - follow-up repeat blood cultures  - follow-up 2D echo  Patient will most likely need TRINH  - at this point, duration of IV antibiotic is unclear, but will be at least 2 weeks  3- Port-A-Cath infection: This is most likely etiology of Staph aureus bacteremia above  PAC has been removed  -  antibiotic plan as in above   -  no new PAC or PICC until bacteremia clears        4- Endometrial cancer:  Last chemotherapy received 09/09/2019  Patient is not neutropenic   - oncology follow-up        5- acute kidney injury:  Most likely secondary to sepsis  Creatinine has normalized  -  antibiotic at full dose  -  monitor creatinine  -  nephrology follow-up     6- acute hypoxic respiratory failure: Most likely secondary to pulmonary edema  Symptoms improved with diuresis  No clinical or radiological signs of pneumonia  - monitor clinically  - no antibiotic needed for this  - diuresis as per primary team     Discussed with patient in detail regarding above plan  Antibiotics:  Vancomycin # 3  Post catheter extraction # 1     Subjective:  PAC was removed last night  Patient has some tenderness at [facility] site, but otherwise she feels better  Temperature is down  No further chills  She is tolerating antibiotic well    No nausea, vomiting or diarrhea  No dizziness or hearing loss  Objective:  Vitals:  Temp:  [97 9 °F (36 6 °C)-102 °F (38 9 °C)] 97 9 °F (36 6 °C)  HR:  [] 78  Resp:  [18-22] 18  BP: ()/(53-64) 109/53  SpO2:  [95 %-100 %] 100 %  Temp (24hrs), Av 4 °F (37 4 °C), Min:97 9 °F (36 6 °C), Max:102 °F (38 9 °C)  Current: Temperature: 97 9 °F (36 6 °C)    Physical Exam:     General: Awake, alert, cooperative, no distress  Neck:  Supple  No mass  No lymphadenopathy  Chest:  Old PAC wound with mild serous sanguinous drainage  Incision is clean  No erythema  Mild tenderness  Lungs: Expansion symmetric, no rales, no wheezing, respirations unlabored  Heart:  Regular rate and rhythm, S1 and S2 normal, no murmur  Abdomen: Soft, nondistended, non-tender, bowel sounds active all four quadrants,        no masses, no organomegaly  Extremities: No edema  No erythema/warmth  No ulcer  Nontender to palpation  Skin:  No rash  Neuro: Moves all extremities  Invasive Devices     Peripheral Intravenous Line            Peripheral IV 19 Left Antecubital less than 1 day          Drain            Percutaneous Nephroureteral Tube (PCNU) Right 1 8 5 Fr 26 Cm 56 days    Closed/Suction Drain Right Chest Bulb 6 Fr  less than 1 day    External Urinary Catheter less than 1 day                Labs studies:   I have personally reviewed pertinent labs    Results from last 7 days   Lab Units 19  0535 19  0521 19  0515 19  1711   POTASSIUM mmol/L 3 2* 2 8* 2 8* 2 9*   CHLORIDE mmol/L 103 103 103 97*   CO2 mmol/L 18* 17* 18* 18*   BUN mg/dL 22 33* 34* 37*   CREATININE mg/dL 0 96 1 60* 1 60* 1 91*   EGFR ml/min/1 73sq m 64 34 34 28   CALCIUM mg/dL 8 8 8 1* 8 1* 8 6   AST U/L  --   --   --  55*   ALT U/L  --   --   --  41   ALK PHOS U/L  --   --   --  91     Results from last 7 days   Lab Units 19  0535 19  0515 19  1711   WBC Thousand/uL 17 59* 11 45* 16 15*   HEMOGLOBIN g/dL 7 0* 9 2* 7 5*   PLATELETS Thousands/uL 89* 90* 131*     Results from last 7 days   Lab Units 09/19/19  1731 09/19/19  1711   BLOOD CULTURE   --  Staphylococcus aureus*   GRAM STAIN RESULT   --  Gram positive cocci in clusters*   URINE CULTURE  60,000-69,000 cfu/ml Staphylococcus coagulase negative*  --        Imaging Studies:   I have personally reviewed pertinent imaging study reports and images in PACS  EKG, Pathology, and Other Studies:   I have personally reviewed pertinent reports

## 2019-09-22 ENCOUNTER — APPOINTMENT (INPATIENT)
Dept: RADIOLOGY | Facility: HOSPITAL | Age: 64
DRG: 314 | End: 2019-09-22
Payer: COMMERCIAL

## 2019-09-22 PROBLEM — R05.9 COUGH: Status: ACTIVE | Noted: 2019-09-22

## 2019-09-22 LAB
ABO GROUP BLD: NORMAL
ANION GAP SERPL CALCULATED.3IONS-SCNC: 10 MMOL/L (ref 4–13)
BACTERIA BLD CULT: ABNORMAL
BACTERIA UR CULT: ABNORMAL
BACTERIA UR CULT: ABNORMAL
BASOPHILS # BLD AUTO: 0.11 THOUSANDS/ΜL (ref 0–0.1)
BASOPHILS NFR BLD AUTO: 1 % (ref 0–1)
BLD GP AB SCN SERPL QL: NEGATIVE
BUN SERPL-MCNC: 14 MG/DL (ref 5–25)
CALCIUM SERPL-MCNC: 9.3 MG/DL (ref 8.3–10.1)
CHLORIDE SERPL-SCNC: 104 MMOL/L (ref 100–108)
CO2 SERPL-SCNC: 20 MMOL/L (ref 21–32)
CREAT SERPL-MCNC: 0.66 MG/DL (ref 0.6–1.3)
EOSINOPHIL # BLD AUTO: 0 THOUSAND/ΜL (ref 0–0.61)
EOSINOPHIL NFR BLD AUTO: 0 % (ref 0–6)
ERYTHROCYTE [DISTWIDTH] IN BLOOD BY AUTOMATED COUNT: 16.5 % (ref 11.6–15.1)
GFR SERPL CREATININE-BSD FRML MDRD: 95 ML/MIN/1.73SQ M
GLUCOSE SERPL-MCNC: 110 MG/DL (ref 65–140)
GRAM STN SPEC: ABNORMAL
HCT VFR BLD AUTO: 20.1 % (ref 34.8–46.1)
HGB BLD-MCNC: 7 G/DL (ref 11.5–15.4)
IMM GRANULOCYTES # BLD AUTO: >0.5 THOUSAND/UL (ref 0–0.2)
IMM GRANULOCYTES NFR BLD AUTO: 7 % (ref 0–2)
LYMPHOCYTES # BLD AUTO: 1.65 THOUSANDS/ΜL (ref 0.6–4.47)
LYMPHOCYTES NFR BLD AUTO: 9 % (ref 14–44)
MCH RBC QN AUTO: 28.8 PG (ref 26.8–34.3)
MCHC RBC AUTO-ENTMCNC: 34.8 G/DL (ref 31.4–37.4)
MCV RBC AUTO: 83 FL (ref 82–98)
MONOCYTES # BLD AUTO: 1.58 THOUSAND/ΜL (ref 0.17–1.22)
MONOCYTES NFR BLD AUTO: 9 % (ref 4–12)
NEUTROPHILS # BLD AUTO: 13.83 THOUSANDS/ΜL (ref 1.85–7.62)
NEUTS SEG NFR BLD AUTO: 74 % (ref 43–75)
NRBC BLD AUTO-RTO: 0 /100 WBCS
PLATELET # BLD AUTO: 104 THOUSANDS/UL (ref 149–390)
PMV BLD AUTO: 13.6 FL (ref 8.9–12.7)
POTASSIUM SERPL-SCNC: 3.6 MMOL/L (ref 3.5–5.3)
PROCALCITONIN SERPL-MCNC: 0.92 NG/ML
RBC # BLD AUTO: 2.43 MILLION/UL (ref 3.81–5.12)
RH BLD: POSITIVE
SODIUM SERPL-SCNC: 134 MMOL/L (ref 136–145)
SPECIMEN EXPIRATION DATE: NORMAL
VANCOMYCIN TROUGH SERPL-MCNC: 16.7 UG/ML (ref 10–20)
WBC # BLD AUTO: 18.48 THOUSAND/UL (ref 4.31–10.16)

## 2019-09-22 PROCEDURE — NC001 PR NO CHARGE: Performed by: RADIOLOGY

## 2019-09-22 PROCEDURE — 86900 BLOOD TYPING SEROLOGIC ABO: CPT | Performed by: HOSPITALIST

## 2019-09-22 PROCEDURE — 86901 BLOOD TYPING SEROLOGIC RH(D): CPT | Performed by: HOSPITALIST

## 2019-09-22 PROCEDURE — 85025 COMPLETE CBC W/AUTO DIFF WBC: CPT | Performed by: HOSPITALIST

## 2019-09-22 PROCEDURE — 74177 CT ABD & PELVIS W/CONTRAST: CPT

## 2019-09-22 PROCEDURE — G8997 SWALLOW GOAL STATUS: HCPCS

## 2019-09-22 PROCEDURE — 99233 SBSQ HOSP IP/OBS HIGH 50: CPT | Performed by: HOSPITALIST

## 2019-09-22 PROCEDURE — 99233 SBSQ HOSP IP/OBS HIGH 50: CPT | Performed by: OBSTETRICS & GYNECOLOGY

## 2019-09-22 PROCEDURE — P9016 RBC LEUKOCYTES REDUCED: HCPCS

## 2019-09-22 PROCEDURE — 80202 ASSAY OF VANCOMYCIN: CPT | Performed by: HOSPITALIST

## 2019-09-22 PROCEDURE — 86923 COMPATIBILITY TEST ELECTRIC: CPT

## 2019-09-22 PROCEDURE — 86850 RBC ANTIBODY SCREEN: CPT | Performed by: HOSPITALIST

## 2019-09-22 PROCEDURE — 99255 IP/OBS CONSLTJ NEW/EST HI 80: CPT | Performed by: UROLOGY

## 2019-09-22 PROCEDURE — 92610 EVALUATE SWALLOWING FUNCTION: CPT

## 2019-09-22 PROCEDURE — 84145 PROCALCITONIN (PCT): CPT | Performed by: HOSPITALIST

## 2019-09-22 PROCEDURE — 80048 BASIC METABOLIC PNL TOTAL CA: CPT | Performed by: HOSPITALIST

## 2019-09-22 PROCEDURE — C9113 INJ PANTOPRAZOLE SODIUM, VIA: HCPCS | Performed by: HOSPITALIST

## 2019-09-22 PROCEDURE — 99233 SBSQ HOSP IP/OBS HIGH 50: CPT | Performed by: INTERNAL MEDICINE

## 2019-09-22 PROCEDURE — G8996 SWALLOW CURRENT STATUS: HCPCS

## 2019-09-22 PROCEDURE — 71260 CT THORAX DX C+: CPT

## 2019-09-22 RX ORDER — CEFAZOLIN SODIUM 2 G/50ML
2000 SOLUTION INTRAVENOUS EVERY 8 HOURS
Status: DISCONTINUED | OUTPATIENT
Start: 2019-09-22 | End: 2019-09-29 | Stop reason: HOSPADM

## 2019-09-22 RX ORDER — PANTOPRAZOLE SODIUM 40 MG/1
40 INJECTION, POWDER, FOR SOLUTION INTRAVENOUS
Status: DISCONTINUED | OUTPATIENT
Start: 2019-09-22 | End: 2019-09-27

## 2019-09-22 RX ADMIN — VANCOMYCIN HYDROCHLORIDE 750 MG: 750 INJECTION, SOLUTION INTRAVENOUS at 09:31

## 2019-09-22 RX ADMIN — PANTOPRAZOLE SODIUM 40 MG: 40 INJECTION, POWDER, FOR SOLUTION INTRAVENOUS at 13:53

## 2019-09-22 RX ADMIN — CEFAZOLIN SODIUM 2000 MG: 2 SOLUTION INTRAVENOUS at 13:01

## 2019-09-22 RX ADMIN — CEFAZOLIN SODIUM 2000 MG: 2 SOLUTION INTRAVENOUS at 22:13

## 2019-09-22 RX ADMIN — MUPIROCIN: 20 OINTMENT TOPICAL at 22:13

## 2019-09-22 RX ADMIN — ENOXAPARIN SODIUM 90 MG: 100 INJECTION SUBCUTANEOUS at 17:59

## 2019-09-22 RX ADMIN — VENLAFAXINE 75 MG: 37.5 TABLET ORAL at 22:13

## 2019-09-22 RX ADMIN — PANTOPRAZOLE SODIUM 40 MG: 40 TABLET, DELAYED RELEASE ORAL at 05:15

## 2019-09-22 RX ADMIN — IOHEXOL 100 ML: 350 INJECTION, SOLUTION INTRAVENOUS at 11:31

## 2019-09-22 NOTE — ASSESSMENT & PLAN NOTE
· Holding quinapril for now given acute kidney injury & normal BPs  · Blood pressure monitoring per protocol

## 2019-09-22 NOTE — SPEECH THERAPY NOTE
Speech Language/Pathology  Today's Date: 9/22/2019     Problem List  Patient Active Problem List   Diagnosis    Benign essential hypertension    Major depression, chronic    Dyslipidemia    Endometrial cancer ([place] [address] )    Class 3 severe obesity with body mass index (BMI) of 45 0 to 49 9 in adult [facility])    Prediabetes    Acute deep vein thrombosis (DVT) of proximal vein of lower extremity ([facility] )    Breast cancer screening    Other hydronephrosis    Encounter for central line care    Cancer related pain    Therapeutic opioid induced constipation    Chemotherapy induced neutropenia (HCC)    Anemia due to chemotherapy    Dehydration    Chemotherapy-induced nausea    Sepsis (HCC)    Acute kidney injury ([place] [address] )    Volume overload    Hypokalemia    Hyponatremia    Bacteremia    Acute respiratory failure (HCC)       Past Medical History  Past Medical History:   Diagnosis Date    Anemia     Chemotherapy follow-up examination     Depression     Diabetes mellitus ([place] [address] )     Endometrial cancer ([place] [address] ) 12/2017    Hyperglycemia     vx type 2 dm -- last assessed 4/1/14; resolved 11/7/17    Hyperlipidemia     Hypertension     Obesity     last assessed 10/14/17; resolved 11/7/17       Past Surgical History  Past Surgical History:   Procedure Laterality Date    ABDOMINAL SURGERY      GASTRIC BYPASS    CHOLECYSTECTOMY      at the time of gastric bypass    COLONOSCOPY      CT NEEDLE BIOPSY LYMPH NODE  7/8/2019    GASTRIC BYPASS      HYSTERECTOMY Bilateral     total abdominal with salpingo-oophorectomy    IR PORT PLACEMENT  7/26/2019    IR PORT REMOVAL  9/20/2019    IR TUBE PLACEMENT NEPHROSTOMY  7/26/2019    OOPHORECTOMY Bilateral     FL LAP, RADICAL HYST W/ TUBE&OV, NODE BX N/A 12/19/2017    Procedure: HYSTERECTOMY LAPAROSCOPIC TOTAL ([address] [facility] [address] [facility] [address]) W/ ROBOTICS; BILATERAL SALPINGOOPHERECTOMY; LYMPH NODE DISSECTION; lysis of adhesions;  Surgeon: Gaby Rodarte MD;  [facility]: [facility];  Service: Gynecology Oncology    MO LAP,DIAGNOSTIC ABDOMEN N/A 12/19/2017    Procedure: LAPAROSCOPY DIAGNOSTIC;  Surgeon: Ozzie Haskins MD;  [facility];  Service: Gynecology Oncology    TONSILLECTOMY      US GUIDED BREAST BIOPSY RIGHT COMPLETE Right 6/28/2019       Summary    Pt presents with inconsistent coughing across all consistencies with R sided chest pain and increased WOB  VSS with SpO2 at 98% on RA  Pt's voice becomes weak at times with an uncontrollable cough  There is questionable concern for aspiration  Would rx continuing NPO status with f/u tomorrow  Recommendations:   Diet: NPO   Meds: crushed with puree and non-oral if possible   Frequent Oral care: 4x/day  Aspiration precautions and compensatory swallowing strategies: upright posture  Other Recommendations/ considerations:    -CT results pending      Current Medical Status  Pt is a 60 y o  female who presented to [facility] with fever, weakness and SOB with activity  Pt reports an increasing cough since Friday which is inconsistent with PO intake + vocal changes and chest pain on R  Pt reports coughing spells are uncontrollable at times and will last for short periods  She does not know if it worsens with PO as it is happening throughout most of  the day  Willie Crabtree is a 60 y o  female who has a past medical history significant for stage I B1 endometrial cancer diagnosed in 2017 on chemotherapy with Taxol and carboplatin  Also with history of cancer related pain, hyperlipidemia, obesity, hypertension, depression  She presented today with a complaint of fevers and generalized weakness, also with shortness of breath with any activity  She had contacted Gyn/Onc office with these complaints, and was advised to obtain CT chest/abdomen/pelvis, however patient declined this and was instructed call back with any worsening symptoms occurred    This afternoon, patient's sister had called 9 patient was febrile to 101 8 F the patient advised to present to ED for further evaluation  Admitted to diarrhea for the past 2 days, with poor p o  Intake      During ED evaluation patient found to be febrile, tachycardic, and with leukocytosis for which sepsis evaluation was initiated, patient had blood culture drawn, received cefepime/vancomycin, and 3L NS bolus  At the time of my evaluation, however, patient visibly tachypneic and admitting to shortness of breath despite supplemental oxygen  A CXR was obtained with wet revealing evidence of pulmonary vascular congestion  She otherwise complains of persistent fever and weakness  Special Studies:  Ct chest pending    CXR 9/19/19-Findings of volume overload with pulmonary vascular congestion and mild interstitial edema  No consolidation  Social/Education/Vocational Hx:  Pt lives alone    Swallow Information   Current Risks for Dysphagia & Aspiration: pt is receiving chemotherapy  Current Symptoms/Concerns: coughing with po and change in respiratory status  Current Diet: NPO   Baseline Diet: regular diet- decreased PO intake recently     Baseline Assessment   Behavior/Cognition: alert  Speech/Language Status: able to participate in conversation and able to follow commands  Patient Positioning: upright in bed     Swallow Mechanism Exam   Facial: symmetrical  Labial: WFL  Lingual: WFL  Velum: unable to visualize  Mandible: adequate ROM  Dentition: adequate  Vocal quality:breathy, weak and dysphonic   Volitional Cough: weak   Respiratory: RA    Consistencies Assessed and Performance   Consistencies Administered: thin liquids, nectar thick and puree    Oral Stage: Mastication not assessed at this time  Decreased retrieval from cup  Oral control and posterior transfer appeared adequate  No anterior spill  Pharyngeal Stage: Timely swallow, but weak upon palpation  Pt questioning SLP if she swallowed ? Decreased sensation  Voice intermittently worsening between trials across consistencies   Delayed coughing with NT/thin>puree  Pt grabbing chest in pain with sometimes long lasting coughing spells which are nonproductive  Discussed possible need for VBS with pt  Would rx seeing pt bedside prior  Esophageal Concerns: none reported      Results Reviewed with: patient and RN   Dysphagia Goals: pt will participate in repeat swallow eval to determine safety of po intake and/or further instrumental testing        Speech Therapy Prognosis   Prognosis: fair    Prognosis Considerations: age, medical status, prior medical history, respiratory status and therapeutic potential      Nidia MAURICIO  [address] Pathologist  Available via [employer]   [place] #[id number]  [place] #[id number]

## 2019-09-22 NOTE — ASSESSMENT & PLAN NOTE
· Evidence upon admission with tachypnea, dyspnea, hypoxia after IV fluid resuscitation for sepsis  · Stairs with 20 mg IV Lasix  · Appears better today  · Wean O2  · Hold off on further diuresis - use Prn  · now on RA but with cough & ARORA, will check CT chest

## 2019-09-22 NOTE — ASSESSMENT & PLAN NOTE
· POA, baseline approximately 0 6  · Suspect in setting of sepsis, poor p o   Intake  · Received 3 L NS in ED however unfortunately with evidence of volume overload also got some lasix  · S/p IVF  · Resolved to 0 6  · monitor post IV contrast today  · Hold quinapril, renally dose medications otherwise

## 2019-09-22 NOTE — PROGRESS NOTES
Progress Note - Infectious Disease   Zuleima Melendez 60 y o  female MRN: [id number]  Unit/Bed#: [facility] 912-01 Encounter: [id number]      Impression/Recommendations:  1- severe sepsis,  with septic shock, POA:  Source is most likely staph aureus bacteremia  Patient is clinically much improved with IV antibiotic and removal of PAC  Hypotension has resolved  Patient is not on pressors  - antibiotic plan as in below  - monitor temperature/WBC  - monitor hemodynamics        2- Staph aureus bacteremia:  Source is most likely PAC infection  PAC has been removed  Patient will need endocarditis workup  - continue IV vancomycin for now  - follow-up on susceptibilities of Staph aureus in blood culture  Will adjust antibiotic accordingly  - follow-up repeat blood cultures  - follow-up 2D echo  Patient will most likely need TRINH  - at this point, duration of IV antibiotic is unclear, but will be at least 2 weeks         3- Port-A-Cath infection: This is most likely etiology of Staph aureus bacteremia above  PAC has been removed  -  antibiotic plan as in above   -  no new PAC or PICC until bacteremia clears        4- Endometrial cancer:  Last chemotherapy received 09/09/2019  Patient is not neutropenic   - oncology follow-up        5- acute kidney injury:  Most likely secondary to sepsis  Creatinine has normalized  -  antibiotic at full dose  -  monitor creatinine  -  nephrology follow-up     6- acute hypoxic respiratory failure: Most likely secondary to pulmonary edema  Symptoms improved with diuresis  No clinical or radiological signs of pneumonia  - monitor clinically  - no antibiotic needed for this  - diuresis as per primary team     Discussed with patient in detail regarding above plan      Antibiotics:  Vancomycin # 4  Post catheter extraction # 2      Subjective:  Patient is comfortable  Mild tenderness at all [facility] site  Temperature stays down  No further chills  She is tolerating antibiotic well  No nausea, vomiting or diarrhea  No dizziness or hearing loss  Objective:  Vitals:  Temp:  [97 8 °F (36 6 °C)-99 4 °F (37 4 °C)] 97 8 °F (36 6 °C)  HR:  [81-94] 81  Resp:  [18-20] 18  BP: (107-112)/(55-63) 112/63  SpO2:  [95 %-100 %] 95 %  Temp (24hrs), Av 7 °F (37 1 °C), Min:97 8 °F (36 6 °C), Max:99 4 °F (37 4 °C)  Current: Temperature: 97 8 °F (36 6 °C)    Physical Exam:     General: Awake, alert, cooperative, no distress  Neck:  Supple  No mass  No lymphadenopathy  Chest:  Old PEG site with dressing in place  Dressing is dry  Incision is clean  Minimal erythema  Improving tenderness  Lungs: Expansion symmetric, no rales, no wheezing, respirations unlabored  Heart:  Regular rate and rhythm, S1 and S2 normal, no murmur  Abdomen: Soft, nondistended, non-tender, bowel sounds active all four quadrants,        no masses, no organomegaly  Extremities: No edema  No erythema/warmth  No ulcer  Nontender to palpation  Skin:  No rash  Neuro: Moves all extremities  Invasive Devices     Peripheral Intravenous Line            Peripheral IV 19 Left Antecubital 1 day          Drain            Percutaneous Nephroureteral Tube (PCNU) Right 1 8 5 Fr 26 Cm 57 days    Closed/Suction Drain Right Chest Bulb 6 Fr  1 day    External Urinary Catheter 1 day                Labs studies:   I have personally reviewed pertinent labs  Results from last 7 days   Lab Units 19  0530 19  0535 19  0521  19  1711   POTASSIUM mmol/L 3 6 3 2* 2 8*   < > 2 9*   CHLORIDE mmol/L 104 103 103   < > 97*   CO2 mmol/L 20* 18* 17*   < > 18*   BUN mg/dL 14 22 33*   < > 37*   CREATININE mg/dL 0 66 0 96 1 60*   < > 1 91*   EGFR ml/min/1 73sq m 95 64 34   < > 28   CALCIUM mg/dL 9 3 8 8 8 1*   < > 8 6   AST U/L  --   --   --   --  55*   ALT U/L  --   --   --   --  41   ALK PHOS U/L  --   --   --   --  91    < > = values in this interval not displayed       Results from last 7 days   Lab Units 09/22/19  0514 09/21/19  0535 09/20/19  0515   WBC Thousand/uL 18 48* 17 59* 11 45*   HEMOGLOBIN g/dL 7 0* 7 0* 9 2*   PLATELETS Thousands/uL 104* 89* 90*     Results from last 7 days   Lab Units 09/20/19  1058 09/20/19  1057 09/19/19  1731 09/19/19  1711   BLOOD CULTURE  Staphylococcus aureus* Staphylococcus aureus*  --  Staphylococcus aureus*   GRAM STAIN RESULT  Gram positive cocci in clusters* Gram positive cocci in clusters*  --  Gram positive cocci in clusters*   URINE CULTURE   --   --  60,000-69,000 cfu/ml Staphylococcus epidermidis*  --        Imaging Studies:   I have personally reviewed pertinent imaging study reports and images in PACS  EKG, Pathology, and Other Studies:   I have personally reviewed pertinent reports

## 2019-09-22 NOTE — CONSULTS
Vancomycin IV Pharmacy-to-Dose Consultation    Fawad Santos is a 60 y o  female who was receiving Vancomycin IV with management by the Pharmacy Consult service for treatment of Staph aureus bacteremia  The patients Vancomycin therapy has been switched to Cefazolin as blood cultures have returned with MSSA  Thank you for allowing us to take part in this patient's care  Pharmacy will sign-off now; please call or re-consult if there are any questions  Kartik Saenz Pharm  D , [facility]  Clinical Pharmacist, [address]  [phone] or [employer]

## 2019-09-22 NOTE — ASSESSMENT & PLAN NOTE
· POA with fevers, leukocytosis, tachycardia    Elevated procalcitonin on admission however with normal lactic acid level  · 2/2 MSSA bacteremia  · Vanco changed to IV Ancef  · Port a cath remnoved by IR - pocket was infected  · TTE performed - neagtive, will need TRINH but will await CT results & monitor resp status for one more day  · Repeat [facility] 9/20 are positive, [facility] 9/21 neg so far

## 2019-09-22 NOTE — ED ATTENDING ATTESTATION
9/19/2019  IAna DO, saw and evaluated the patient  I have discussed the patient with the resident/non-physician practitioner and agree with the resident's/non-physician practitioner's findings, Plan of Care, and MDM as documented in the resident's/non-physician practitioner's note, except where noted  All available labs and Radiology studies were reviewed  I was present for key portions of any procedure(s) performed by the resident/non-physician practitioner and I was immediately available to provide assistance  At this point I agree with the current assessment done in the Emergency Department  I have conducted an independent evaluation of this patient a history and physical is as follows:    ED Course     Patient is a 72-year-old female presenting with generalized weakness and fever  The patient has a history of endometrial cancer with metastasis to the kidney which led nephrostomy  Patient is febrile and complains of generalized weakness but no focal areas of pain or discomfort  Patient does meet SIRS criteria with the fever and tachycardia  Sepsis evaluation has been initiated  The patient with episodes of hypotension and therefore be administered 30 cc/kilos of IV fluids, broad-spectrum antibiotics  Sepsis alert was activated  Patient did have episode of dyspnea, concern for volume overload  Obtain stat chest x-ray, may need BiPAP  Patient is a full code  Admit  Blood urine culture sent         Critical Care Time  Procedures

## 2019-09-22 NOTE — CONSULTS
UROLOGY CONSULTATION NOTE     Patient Identifiers: Gilberto Griffiths (MRN [id number])  Service Requesting Consultation:  Hospitalist  Service Providing Consultation:  Urology, Beryl Irizarry MD    Date of Service: 9/22/2019  Consults    Reason for Consultation:  Right percutaneous nephrostomy in place, slight hematuria    History of Present Illness:     Gilberto Griffiths is a 60 y o  old with metastatic endometrial carcinoma  Right percutaneous nephrostomy placed late July for ureteral compression  Draining well since that time, and has been scheduled for tube change in late October, three months from placement  Admitted now for medical concerns, urine is slightly blood tinged, pink to orange in color through the nephrostomy, good output  ASSESSMENT & PLAN:     Irrigate tube once per day with 5 mL normal saline  Nephrostomy likely be chronically colonized, would not treat for infection less clinical signs of that  Will reassess daily, not clear that it needs to be changed right now since it is draining good amounts, and the amount of blood is mild      Past Medical, Past Surgical History:     Past Medical History:   Diagnosis Date    Anemia     Chemotherapy follow-up examination     Depression     Diabetes mellitus ([facility] [address] )     Endometrial cancer ([facility] [address] ) 12/2017    Hyperglycemia     vx type 2 dm -- last assessed 4/1/14; resolved 11/7/17    Hyperlipidemia     Hypertension     Obesity     last assessed 10/14/17; resolved 11/7/17   :    Past Surgical History:   Procedure Laterality Date    ABDOMINAL SURGERY      GASTRIC BYPASS    CHOLECYSTECTOMY      at the time of gastric bypass    COLONOSCOPY      CT NEEDLE BIOPSY LYMPH NODE  7/8/2019    GASTRIC BYPASS      HYSTERECTOMY Bilateral     total abdominal with salpingo-oophorectomy    IR PORT PLACEMENT  7/26/2019    IR PORT REMOVAL  9/20/2019    IR TUBE PLACEMENT NEPHROSTOMY  7/26/2019    OOPHORECTOMY Bilateral     TX LAP, RADICAL HYST W/ TUBE&OV, NODE BX N/A 12/19/2017    Procedure: HYSTERECTOMY LAPAROSCOPIC TOTAL ([address]) W/ ROBOTICS; BILATERAL SALPINGOOPHERECTOMY; LYMPH NODE DISSECTION; lysis of adhesions;  Surgeon: Alysia Bernardo MD;  [facility];  Service: Gynecology Oncology    [place] LAP,DIAGNOSTIC ABDOMEN N/A 12/19/2017    Procedure: LAPAROSCOPY DIAGNOSTIC;  Surgeon: Alysia Bernardo MD;  [facility];  Service: Gynecology Oncology    TONSILLECTOMY      US GUIDED BREAST BIOPSY RIGHT COMPLETE Right 6/28/2019   :    Medications, Allergies:     Current Facility-Administered Medications   Medication Dose Route Frequency    acetaminophen (TYLENOL) tablet 650 mg  650 mg Oral Q6H PRN    ARIPiprazole (ABILIFY) tablet 10 mg  10 mg Oral Daily    aspirin (ECOTRIN LOW STRENGTH) EC tablet 81 mg  81 mg Oral Daily    benzonatate (TESSALON PERLES) capsule 200 mg  200 mg Oral TID PRN    cholecalciferol (VITAMIN D3) tablet 1,000 Units  1,000 Units Oral Daily    mupirocin (BACTROBAN) 2 % ointment   Topical TID    oxybutynin (DITROPAN-XL) 24 hr tablet 10 mg  10 mg Oral Daily    pantoprazole (PROTONIX) EC tablet 40 mg  40 mg Oral Early Morning    rivaroxaban (XARELTO) tablet 20 mg  20 mg Oral Daily With Breakfast    sodium chloride (PF) 0 9 % injection 10 mL  10 mL Intracatheter Daily    vancomycin (VANCOCIN) IVPB (premix) 750 mg  12 5 mg/kg (Order-Specific) Intravenous Q12H    venlafaxine (EFFEXOR) tablet 75 mg  75 mg Oral TID       Allergies:   Allergies   Allergen Reactions    Cephalosporins Rash     Which Cephalosporin reaction was to not specified; however, has tolerated Amoxicillin, Cefazolin, and Cefepime   :    Social and Family History:   Social History:   Social History     Tobacco Use    Smoking status: Never Smoker    Smokeless tobacco: Never Used   Substance Use Topics    Alcohol use: Not Currently    Drug use: No        Social History     Tobacco Use   Smoking Status Never Smoker   Smokeless Tobacco Never Used       Family History:  Family History   Problem Relation Age of Onset    Other Mother         dyslipidemia    Ovarian cancer Mother 48    Lymphoma Father     Bone cancer Maternal Grandfather     No Known Problems Sister     No Known Problems Maternal Grandmother     No Known Problems Paternal Grandmother     No Known Problems Paternal Grandfather     No Known Problems Maternal Aunt     No Known Problems Maternal Aunt     No Known Problems Maternal Aunt     No Known Problems Maternal Aunt     No Known Problems Paternal Aunt     No Known Problems Paternal Aunt     No Known Problems Paternal Aunt     No Known Problems Paternal Aunt     No Known Problems Brother     No Known Problems Brother    :     Review of Systems:     General: Huma Fever, chills, or night sweats  Cardiac: Negative for chest pain  Pulmonary: Negative for shortness of breath  Gastrointestinal: Denies Abdominal pain, no  nausea, vomiting, or diarrhea   Genitourinary: See HPI above  Neurologic: No focal signs   Musculo-skeletal: No complaints    All other systems queried were negative  Physical Exam:   (patient seen briefly, on stretcher in transit to CT scan)  General appearance: Alert, fatigued      Labs:     Lab Results   Component Value Date    HGB 7 0 (L) 09/22/2019    HCT 20 1 (L) 09/22/2019    WBC 18 48 (H) 09/22/2019     (L) 09/22/2019   ]    Lab Results   Component Value Date     10/27/2017    K 3 6 09/22/2019     09/22/2019    CO2 20 (L) 09/22/2019    BUN 14 09/22/2019    CREATININE 0 66 09/22/2019    CALCIUM 9 3 09/22/2019    GLUCOSE 219 (H) 12/19/2017   ]    Imaging:   I personally reviewed the images and report of the following studies, and reviewed them with the patient:  CT        Thank you for allowing me to participate in this patients care  Please do not hesitate to call with any additional questions    Paco Farias MD

## 2019-09-22 NOTE — PROGRESS NOTES
Addendum to progress note    Staph aureus in blood culture is MSSA  Will change antibiotic to high-dose IV cefazolin

## 2019-09-22 NOTE — PROGRESS NOTES
GYN-ONC Progress Note  Tonya Leal  [id number]  PPHP [id number]/PPHP [id number]  9/22/2019  7:31 AM     ASSESS:  60-year-old with recurrent stage I B1 endometrial cancer with biopsy-proven evidence of right pelvic side wall recurrence, currently on Carbo/Taxol/Neulasta, admitted with sepsis secondary to Port-A-Cath infection    PLAN:    1  Sepsis/Bacteremia:  Staphylococcus aureus bacteremia in the setting of indwelling Port-A-Cath, removed 9/20, remains on vancomycin, elevated leukocytosis and procalcitonin appreciated-though procalcitonin trending down, without fevers for the past 24 hours, ID recommendations appreciated    2  Acute respiratory failure: In the setting of tachypnea, dyspnea,  hypoxia after aggressive IV fluid resuscitation, status post Lasix, now on room air, continue to monitor    3  Anemia:  Hemoglobin remains at 7, asymptomatic, continue to monitor, transfuse if indicated    4  Acute kidney injury:  Creatinine back to baseline, continue to monitor    5  Recurrent endometrial cancer:  Hold chemotherapy    6   Hypertension: holding home meds     Dispo: inpatient     PPx: SCDs, lovenox  FEN: regular diet, replete lytes prn  Pain mgmnt: tylenol   Invasive lines: port a cath removed  Code status: full   ____________________    SUBJECTIVE  History of Present Illness:  Overnight: no acute events, states she feels better following port a cath removal  Pain: right breast discomfort at site of port a cath removal  Tolerating Oral Intake: yes   Voiding: yes  Flatus: yes  Bowel Movement: no  Ambulating: yes  Vaginal Bleeding: no  Pelvic Pain: no  Chest Pain: no  Shortness of Breath: no  Leg Pain/Discomfort: no      OBJECTIVE  Vitals  Temp:  [98 6 °F (37 °C)-99 4 °F (37 4 °C)] 98 9 °F (37 2 °C)  HR:  [87-94] 91  Resp:  [18-20] 18  BP: (107-111)/(55-63) 107/55       Intake/Output Summary (Last 24 hours) at 9/22/2019 0731  Last data filed at 9/22/2019 8055  Gross per 24 hour   Intake 550 ml   Output 2685 ml   Net -2135 ml       Physical Exam:   Physical Exam   Constitutional: Vital signs are normal  She appears cachectic  She has a sickly appearance  Cardiovascular: Normal rate, regular rhythm, normal heart sounds and intact distal pulses  Pulmonary/Chest: Effort normal and breath sounds normal  No stridor  No respiratory distress  Abdominal: Soft  Bowel sounds are normal  She exhibits no distension  There is no tenderness  Skin: Skin is warm and dry  Psychiatric: She has a normal mood and affect   Her behavior is normal          Labs:   Recent Results (from the past 72 hour(s))   CBC and differential    Collection Time: 09/19/19  5:11 PM   Result Value Ref Range    WBC 16 15 (H) 4 31 - 10 16 Thousand/uL    RBC 2 67 (L) 3 81 - 5 12 Million/uL    Hemoglobin 7 5 (L) 11 5 - 15 4 g/dL    Hematocrit 22 4 (L) 34 8 - 46 1 %    MCV 84 82 - 98 fL    MCH 28 1 26 8 - 34 3 pg    MCHC 33 5 31 4 - 37 4 g/dL    RDW 16 2 (H) 11 6 - 15 1 %    MPV 10 9 8 9 - 12 7 fL    Platelets 447 (L) 787 - 390 Thousands/uL    nRBC 0 /100 WBCs   Comprehensive metabolic panel    Collection Time: 09/19/19  5:11 PM   Result Value Ref Range    Sodium 129 (L) 136 - 145 mmol/L    Potassium 2 9 (L) 3 5 - 5 3 mmol/L    Chloride 97 (L) 100 - 108 mmol/L    CO2 18 (L) 21 - 32 mmol/L    ANION GAP 14 (H) 4 - 13 mmol/L    BUN 37 (H) 5 - 25 mg/dL    Creatinine 1 91 (H) 0 60 - 1 30 mg/dL    Glucose 113 65 - 140 mg/dL    Calcium 8 6 8 3 - 10 1 mg/dL    AST 55 (H) 5 - 45 U/L    ALT 41 12 - 78 U/L    Alkaline Phosphatase 91 46 - 116 U/L    Total Protein 6 8 6 4 - 8 2 g/dL    Albumin 2 9 (L) 3 5 - 5 0 g/dL    Total Bilirubin 0 27 0 20 - 1 00 mg/dL    eGFR 28 ml/min/1 73sq m   Lactic acid x2    Collection Time: 09/19/19  5:11 PM   Result Value Ref Range    LACTIC ACID 0 7 0 5 - 2 0 mmol/L   Procalcitonin    Collection Time: 09/19/19  5:11 PM   Result Value Ref Range    Procalcitonin 4 39 (H) <=0 25 ng/ml   Protime-INR    Collection Time: 09/19/19  5:11 PM   Result Value Ref Range    Protime 32 9 (H) 11 6 - 14 5 seconds    INR 3 29 (H) 0 84 - 1 19   APTT    Collection Time: 09/19/19  5:11 PM   Result Value Ref Range    PTT 69 (H) 23 - 37 seconds   Blood culture #1    Collection Time: 09/19/19  5:11 PM   Result Value Ref Range    Blood Culture Staphylococcus aureus (A)     Gram Stain Result Gram positive cocci in clusters (A)        Susceptibility    Staphylococcus aureus - JAMES     Ampicillin ($$)        Cefazolin ($)        Gentamicin ($$)        Oxacillin        Tetracycline        Trimethoprim + Sulfamethoxazole ($$$)        Vancomycin ($)      Manual Differential(PHLEBS Do Not Order)    Collection Time: 09/19/19  5:11 PM   Result Value Ref Range    Segmented % 60 43 - 75 %    Bands % 25 (H) 0 - 8 %    Lymphocytes % 4 (L) 14 - 44 %    Monocytes % 5 4 - 12 %    Eosinophils, % 0 0 - 6 %    Basophils % 0 0 - 1 %    Metamyelocytes% 3 (H) 0 - 1 %    Myelocytes % 2 (H) 0 - 1 %    Atypical Lymphocytes % 1 (H) <=0 %    Absolute Neutrophils 13 73 (H) 1 85 - 7 62 Thousand/uL    Lymphocytes Absolute 0 65 0 60 - 4 47 Thousand/uL    Monocytes Absolute 0 81 0 00 - 1 22 Thousand/uL    Eosinophils Absolute 0 00 0 00 - 0 40 Thousand/uL    Basophils Absolute 0 00 0 00 - 0 10 Thousand/uL    Total Counted      Toxic Granulation Present     RBC Morphology Present     [place] Cells Present     Ovalocytes Present     Poikilocytes Present     Polychromasia Present     Platelet Estimate Decreased (A) Adequate   UA w Reflex to Microscopic w Reflex to Culture    Collection Time: 09/19/19  5:31 PM   Result Value Ref Range    Color, UA Yellow     Clarity, UA Cloudy     Specific [place], UA 1 007 1 003 - 1 030    pH, UA 7 5 4 5, 5 0, 5 5, 6 0, 6 5, 7 0, 7 5, 8 0    Leukocytes, UA Small (A) Negative    Nitrite, UA Negative Negative    Protein,  (2+) (A) Negative mg/dl    Glucose, UA Negative Negative mg/dl    Ketones, UA Negative Negative mg/dl    Urobilinogen, UA 0 2 0 2, 1 0 E U /dl E U /dl Bilirubin, UA Negative Negative    Blood, UA Large (A) Negative   Urine Microscopic    Collection Time: 09/19/19  5:31 PM   Result Value Ref Range    RBC, UA 20-30 (A) None Seen, 0-5 /hpf    WBC, UA 10-20 (A) None Seen, 0-5, 5-55, 5-65 /hpf    Epithelial Cells Occasional None Seen, Occasional /hpf    Bacteria, UA Moderate (A) None Seen, Occasional /hpf   Urine culture    Collection Time: 09/19/19  5:31 PM   Result Value Ref Range    Urine Culture 60,000-69,000 cfu/ml Staphylococcus epidermidis (A)    Procalcitonin AM Draw    Collection Time: 09/20/19  5:15 AM   Result Value Ref Range    Procalcitonin 4 97 (H) <=0 25 ng/ml   Basic metabolic panel    Collection Time: 09/20/19  5:15 AM   Result Value Ref Range    Sodium 132 (L) 136 - 145 mmol/L    Potassium 2 8 (L) 3 5 - 5 3 mmol/L    Chloride 103 100 - 108 mmol/L    CO2 18 (L) 21 - 32 mmol/L    ANION GAP 11 4 - 13 mmol/L    BUN 34 (H) 5 - 25 mg/dL    Creatinine 1 60 (H) 0 60 - 1 30 mg/dL    Glucose 99 65 - 140 mg/dL    Calcium 8 1 (L) 8 3 - 10 1 mg/dL    eGFR 34 ml/min/1 73sq m   CBC (With Platelets)    Collection Time: 09/20/19  5:15 AM   Result Value Ref Range    WBC 11 45 (H) 4 31 - 10 16 Thousand/uL    RBC 3 35 (L) 3 81 - 5 12 Million/uL    Hemoglobin 9 2 (L) 11 5 - 15 4 g/dL    Hematocrit 28 1 (L) 34 8 - 46 1 %    MCV 84 82 - 98 fL    MCH 27 5 26 8 - 34 3 pg    MCHC 32 7 31 4 - 37 4 g/dL    RDW 16 4 (H) 11 6 - 15 1 %    Platelets 90 (L) 312 - 390 Thousands/uL    MPV 12 3 8 9 - 12 7 fL   Basic metabolic panel    Collection Time: 09/20/19  5:21 AM   Result Value Ref Range    Sodium 132 (L) 136 - 145 mmol/L    Potassium 2 8 (L) 3 5 - 5 3 mmol/L    Chloride 103 100 - 108 mmol/L    CO2 17 (L) 21 - 32 mmol/L    ANION GAP 12 4 - 13 mmol/L    BUN 33 (H) 5 - 25 mg/dL    Creatinine 1 60 (H) 0 60 - 1 30 mg/dL    Glucose 98 65 - 140 mg/dL    Calcium 8 1 (L) 8 3 - 10 1 mg/dL    eGFR 34 ml/min/1 73sq m   Magnesium    Collection Time: 09/20/19  5:21 AM   Result Value Ref Range Magnesium 1 5 (L) 1 6 - 2 6 mg/dL   Blood culture    Collection Time: 09/20/19 10:57 AM   Result Value Ref Range    Blood Culture Staphylococcus aureus (A)     Gram Stain Result Gram positive cocci in clusters (A)    Blood culture    Collection Time: 09/20/19 10:58 AM   Result Value Ref Range    Blood Culture Staphylococcus aureus (A)     Gram Stain Result Gram positive cocci in clusters (A)    Vancomycin, random    Collection Time: 09/20/19  5:42 PM   Result Value Ref Range    Vancomycin Rm 9 5 ug/mL   Protime-INR    Collection Time: 09/20/19  5:42 PM   Result Value Ref Range    Protime 23 1 (H) 11 6 - 14 5 seconds    INR 2 10 (H) 0 84 - 1 19   Culture, Catheter Tip    Collection Time: 09/20/19  7:34 PM   Result Value Ref Range    Catheter Tip Culture Culture results to follow      Basic metabolic panel    Collection Time: 09/21/19  5:35 AM   Result Value Ref Range    Sodium 132 (L) 136 - 145 mmol/L    Potassium 3 2 (L) 3 5 - 5 3 mmol/L    Chloride 103 100 - 108 mmol/L    CO2 18 (L) 21 - 32 mmol/L    ANION GAP 11 4 - 13 mmol/L    BUN 22 5 - 25 mg/dL    Creatinine 0 96 0 60 - 1 30 mg/dL    Glucose 94 65 - 140 mg/dL    Calcium 8 8 8 3 - 10 1 mg/dL    eGFR 64 ml/min/1 73sq m   CBC and differential    Collection Time: 09/21/19  5:35 AM   Result Value Ref Range    WBC 17 59 (H) 4 31 - 10 16 Thousand/uL    RBC 2 52 (L) 3 81 - 5 12 Million/uL    Hemoglobin 7 0 (L) 11 5 - 15 4 g/dL    Hematocrit 21 0 (L) 34 8 - 46 1 %    MCV 83 82 - 98 fL    MCH 27 8 26 8 - 34 3 pg    MCHC 33 3 31 4 - 37 4 g/dL    RDW 16 2 (H) 11 6 - 15 1 %    MPV 12 2 8 9 - 12 7 fL    Platelets 89 (L) 257 - 390 Thousands/uL    nRBC 0 /100 WBCs    Neutrophils Relative 76 (H) 43 - 75 %    Immat GRANS % 8 (H) 0 - 2 %    Lymphocytes Relative 7 (L) 14 - 44 %    Monocytes Relative 9 4 - 12 %    Eosinophils Relative 0 0 - 6 %    Basophils Relative 0 0 - 1 %    Neutrophils Absolute 13 52 (H) 1 85 - 7 62 Thousands/µL    Immature Grans Absolute >0 50 (H) 0 00 - 0 20 Thousand/uL    Lymphocytes Absolute 1 17 0 60 - 4 47 Thousands/µL    Monocytes Absolute 1 51 (H) 0 17 - 1 22 Thousand/µL    Eosinophils Absolute 0 01 0 00 - 0 61 Thousand/µL    Basophils Absolute 0 06 0 00 - 0 10 Thousands/µL   CBC and differential    Collection Time: 09/22/19  5:14 AM   Result Value Ref Range    WBC 18 48 (H) 4 31 - 10 16 Thousand/uL    RBC 2 43 (L) 3 81 - 5 12 Million/uL    Hemoglobin 7 0 (L) 11 5 - 15 4 g/dL    Hematocrit 20 1 (L) 34 8 - 46 1 %    MCV 83 82 - 98 fL    MCH 28 8 26 8 - 34 3 pg    MCHC 34 8 31 4 - 37 4 g/dL    RDW 16 5 (H) 11 6 - 15 1 %    MPV 13 6 (H) 8 9 - 12 7 fL    Platelets 734 (L) 330 - 390 Thousands/uL    nRBC 0 /100 WBCs    Neutrophils Relative 74 43 - 75 %    Immat GRANS % 7 (H) 0 - 2 %    Lymphocytes Relative 9 (L) 14 - 44 %    Monocytes Relative 9 4 - 12 %    Eosinophils Relative 0 0 - 6 %    Basophils Relative 1 0 - 1 %    Neutrophils Absolute 13 83 (H) 1 85 - 7 62 Thousands/µL    Immature Grans Absolute >0 50 (H) 0 00 - 0 20 Thousand/uL    Lymphocytes Absolute 1 65 0 60 - 4 47 Thousands/µL    Monocytes Absolute 1 58 (H) 0 17 - 1 22 Thousand/µL    Eosinophils Absolute 0 00 0 00 - 0 61 Thousand/µL    Basophils Absolute 0 11 (H) 0 00 - 0 10 Thousands/µL   Basic metabolic panel    Collection Time: 09/22/19  5:30 AM   Result Value Ref Range    Sodium 134 (L) 136 - 145 mmol/L    Potassium 3 6 3 5 - 5 3 mmol/L    Chloride 104 100 - 108 mmol/L    CO2 20 (L) 21 - 32 mmol/L    ANION GAP 10 4 - 13 mmol/L    BUN 14 5 - 25 mg/dL    Creatinine 0 66 0 60 - 1 30 mg/dL    Glucose 110 65 - 140 mg/dL    Calcium 9 3 8 3 - 10 1 mg/dL    eGFR 95 ml/min/1 73sq m   Procalcitonin    Collection Time: 09/22/19  5:30 AM   Result Value Ref Range    Procalcitonin 0 92 (H) <=0 25 ng/ml       Meds:  Scheduled Meds:  Current Facility-Administered Medications:  acetaminophen 650 mg Oral Q6H PRN Emperatriz Rosas PA-C    ARIPiprazole 10 mg Oral Daily Cece Alvarado DO    aspirin 81 mg Oral Daily Kimmy Preston Nadir,     benzonatate 200 mg Oral TID PRN Carla Rosas PA-C    cholecalciferol 1,000 Units Oral Daily James Bertrand, DO    mupirocin  Topical TID James Bertrand, DO    oxybutynin 10 mg Oral Daily Jaems Bertrand, DO    pantoprazole 40 mg Oral Early Morning James Bertrand, DO    rivaroxaban 20 mg Oral Daily With Breakfast James Bertrand, DO    sodium chloride (PF) 10 mL Intracatheter Daily James Bertrand, DO    vancomycin 12 5 mg/kg (Order-Specific) Intravenous Q12H Amalia Rivas MD Last Rate: 750 mg (09/21/19 2027)   venlafaxine 75 mg Oral TID James Bertrand, DO      Continuous Infusions:   PRN Meds:   acetaminophen    benzonatate    Imaging Studies: I have personally reviewed pertinent reports  EKG, Pathology, Microbiology, and Other Studies: I have personally reviewed pertinent reports        __________________    Signature / Title: Alessia Johns DO, Ob/Gyn, PGY-3  Date: 9/22/2019  Time: 7:31 AM

## 2019-09-22 NOTE — PLAN OF CARE
Bedside eval complete  Rx continuing NPO status with f/u from [address] [place] [address]  tomorrow  CT abdomen pending

## 2019-09-22 NOTE — NURSING NOTE
Drain removed from R chest port pocket   Drain removed intact without difficulty  Patient tolerated well  Steri strip and DSD applied  Sutures for port pocket remain intact - see Dr James Laureano note  Patients RN Hubert Becker updated

## 2019-09-22 NOTE — ASSESSMENT & PLAN NOTE
· MSSA bacteremia  · Repeat BC 9/20 positive  · Port removed - pocket looked infected  · IV Vanco changed to IV ancef  · Repeat cultures 9/21-  Neg so far  · Check TTE negative, needs TRINH

## 2019-09-22 NOTE — ASSESSMENT & PLAN NOTE
· Suspected by the admitting physician at the time of evaluation due to dyspnea, tachypnea, hypoxia  · Received 3 L of fluid for sepsis followed by 20 mg of IV Lasix  · No longer appears volume overload, mild tachypnea - hold IVF  · Monitor with PRBC  · Hold off on further diuresis - use prn

## 2019-09-22 NOTE — ASSESSMENT & PLAN NOTE
· Appreciate Gynecologic Oncology evaluation, she received Taxol/carboplatin and received last chemotherapy 09/09/2019  · Received granix 08/31/2019, also Neulasta 09/09/2019 and 09/16/2019  · Chemotherapy to be on hold for now given acute infection  · F/u CT CAP today

## 2019-09-22 NOTE — PROGRESS NOTES
IR Note    Port removal POD 2  WBC up to 18k    CT today reviewed  - no fluid at port pocket, drain in place  - will have IR team remove this drain      Other findings:  - on CT: right Internal jugular vein filling defect  - this represents septic thrombus in this patient until proven otherwise  - patient on anticoagulation (maintain), this was not held for port removal  - patient on appropriate ABX  - would not manipulate this thrombus with a catheter due to risk of embolization (septic emboli to lungs)  - there may be some septic emboli already on my prelim interpretation (left lung)  - personally discussed with ID and Hospitalist team  - consider TRINH for these findings    IR to sign off after drain removal    Follow up:  Patient will need port pocket non absorbable sutures removed one week after port removal, can be inpatient or outpatient

## 2019-09-22 NOTE — ASSESSMENT & PLAN NOTE
· Sodium 129 on admission  · Hypovolemic hypernatremia  · Status is IV fluids followed by Lasix  · Now 134

## 2019-09-22 NOTE — PROGRESS NOTES
Progress Note - Alejandro Peralta Pulcini 1955, 60 y o  female MRN: [id number]    Unit/Bed#: [facility] 912-01 Encounter: [id number]    Primary Care Provider: Roberto Owen DO   Date and time admitted to hospital: 9/19/2019  4:09 PM        Cough  Assessment & Plan  · Kept NPO, speech evaled - rec keep NPO  · F/u CT chest  · On RA  · Use PRN lasix    Acute respiratory failure (HCC)  Assessment & Plan  · Evidence upon admission with tachypnea, dyspnea, hypoxia after IV fluid resuscitation for sepsis  · Stairs with 20 mg IV Lasix  · Appears better today  · Wean O2  · Hold off on further diuresis - use Prn  · now on RA but with cough & ARORA, will check CT chest    Bacteremia  Assessment & Plan  · MSSA bacteremia  · Repeat BC 9/20 positive  · Port removed - pocket looked infected  · IV Vanco changed to IV ancef  · Repeat cultures 9/21-  Neg so far  · Check TTE negative, needs TRINH    Hyponatremia  Assessment & Plan  · Sodium 129 on admission  · Hypovolemic hypernatremia  · Status is IV fluids followed by Lasix  · Now 134    Volume overload  Assessment & Plan  · Suspected by the admitting physician at the time of evaluation due to dyspnea, tachypnea, hypoxia  · Received 3 L of fluid for sepsis followed by 20 mg of IV Lasix  · No longer appears volume overload, mild tachypnea - hold IVF  · Monitor with PRBC  · Hold off on further diuresis - use prn    Acute kidney injury ([address] )  Assessment & Plan  · POA, baseline approximately 0 6  · Suspect in setting of sepsis, poor p o   Intake  · Received 3 L NS in ED however unfortunately with evidence of volume overload also got some lasix  · S/p IVF  · Resolved to 0 6  · monitor post IV contrast today  · Hold quinapril, renally dose medications otherwise    Anemia due to chemotherapy  Assessment & Plan  · Hemoglobin 7 5 on admission, repeat 9 2 - again 7 0 persistent  · Will transfuse 1 U PRBC    Acute deep vein thrombosis (DVT) of proximal vein of lower extremity (HCC)  Assessment & Plan  · On Eliquis for this, since NPO will place her on lovenox BID    Endometrial cancer [facility])  Assessment & Plan  · Appreciate Gynecologic Oncology evaluation, she received Taxol/carboplatin and received last chemotherapy 09/09/2019  · Received granix 08/31/2019, also Neulasta 09/09/2019 and 09/16/2019  · Chemotherapy to be on hold for now given acute infection  · F/u CT CAP today    Benign essential hypertension  Assessment & Plan  · Holding quinapril for now given acute kidney injury & normal BPs  · Blood pressure monitoring per protocol    * Sepsis ([facility] [address] )  Assessment & Plan  · POA with fevers, leukocytosis, tachycardia  Elevated procalcitonin on admission however with normal lactic acid level  · 2/2 MSSA bacteremia  · Vanco changed to IV Ancef  · Port a cath remnoved by IR - pocket was infected  · TTE performed - neagtive, will need TRINH but will await CT results & monitor resp status for one more day  · Repeat [facility] 9/20 are positive, [facility] 9/21 neg so far      [address] Internal Medicine Progress Note  Patient: Jammie Grijalva 60 y o  female   MRN: [id number]  PCP: Diogenes Layne DO  Unit/Bed#: [facility] 912-01 Encounter: [id number]  Date Of Visit: 09/22/19    Assessment:    Principal Problem:    Sepsis ([facility] [address] )  Active Problems:    Benign essential hypertension    Endometrial cancer ([facility] [address] )    Acute deep vein thrombosis (DVT) of proximal vein of lower extremity ([facility] [address] )    Anemia due to chemotherapy    Acute kidney injury ([facility] [address] )    Volume overload    Hypokalemia    Hyponatremia    Bacteremia    Acute respiratory failure (HCC)    Cough           VTE Pharmacologic Prophylaxis:   Pharmacologic: Enoxaparin (Lovenox)  Mechanical VTE Prophylaxis in Place: Yes    Patient Centered Rounds: I have performed bedside rounds with nursing staff today  Discussions with Specialists or Other Care Team Provider: GYN Onc    Education and Discussions with Family / Patient: patient    Time Spent for Care: 45 minutes    More than 50% of total time spent on counseling and coordination of care as described above  Current Length of Stay: 3 day(s)    Current Patient Status: Inpatient   Certification Statement: The patient will continue to require additional inpatient hospital stay due to not ready    Discharge Plan / Estimated Discharge Date: unclear    Code Status: Level 1 - Full Code      Subjective:   Feels ok, overall not good, has been having bouts of cough, at times with eating as well, denies SOB,  Having right chest wall are apain    Objective:     Vitals:   Temp (24hrs), Av 6 °F (37 °C), Min:97 8 °F (36 6 °C), Max:99 4 °F (37 4 °C)    Temp:  [97 8 °F (36 6 °C)-99 4 °F (37 4 °C)] 98 1 °F (36 7 °C)  HR:  [81-94] 94  Resp:  [18-20] 18  BP: (107-115)/(55-63) 115/60  SpO2:  [95 %-100 %] 98 %  Body mass index is 40 25 kg/m²  Input and Output Summary (last 24 hours): Intake/Output Summary (Last 24 hours) at 2019 1435  Last data filed at 2019 1352  Gross per 24 hour   Intake 220 ml   Output 2035 ml   Net -1815 ml       Physical Exam:     Physical Exam   Constitutional: She is oriented to person, place, and time  She appears well-developed and well-nourished  HENT:   Head: Normocephalic and atraumatic  Mouth/Throat: Oropharynx is clear and moist    Eyes: Conjunctivae are normal    Cardiovascular: Normal rate and regular rhythm  Exam reveals no friction rub  No murmur heard  Pulmonary/Chest: Effort normal and breath sounds normal  No stridor  No respiratory distress  She has no wheezes  Abdominal: Soft  She exhibits no distension  There is no tenderness  There is no guarding  Neurological: She is alert and oriented to person, place, and time  Vitals reviewed          Additional Data:     Labs:    Results from last 7 days   Lab Units 19  0514   WBC Thousand/uL 18 48*   HEMOGLOBIN g/dL 7 0*   HEMATOCRIT % 20 1*   PLATELETS Thousands/uL 104*   NEUTROS PCT % 74   LYMPHS PCT % 9*   MONOS PCT % 9   EOS PCT % 0 Results from last 7 days   Lab Units 09/22/19  0530  09/19/19  1711   POTASSIUM mmol/L 3 6   < > 2 9*   CHLORIDE mmol/L 104   < > 97*   CO2 mmol/L 20*   < > 18*   BUN mg/dL 14   < > 37*   CREATININE mg/dL 0 66   < > 1 91*   CALCIUM mg/dL 9 3   < > 8 6   ALK PHOS U/L  --   --  91   ALT U/L  --   --  41   AST U/L  --   --  55*    < > = values in this interval not displayed  Results from last 7 days   Lab Units 09/20/19  1742   INR  2 10*       * I Have Reviewed All Lab Data Listed Above  * Additional Pertinent Lab Tests Reviewed: All Labs Within Last 24 Hours Reviewed    Imaging:    Imaging Reports Reviewed Today Include:   Imaging Personally Reviewed by Myself Includes:      Recent Cultures (last 7 days):     Results from last 7 days   Lab Units 09/21/19  0535 09/20/19  1058 09/20/19  1057 09/19/19  1731 09/19/19  1711   BLOOD CULTURE  No Growth at 24 hrs   Staphylococcus aureus* Staphylococcus aureus*  --  Staphylococcus aureus*   GRAM STAIN RESULT   --  Gram positive cocci in clusters* Gram positive cocci in clusters*  --  Gram positive cocci in clusters*   URINE CULTURE   --   --   --  60,000-69,000 cfu/ml Staphylococcus epidermidis*  --        Last 24 Hours Medication List:     Current Facility-Administered Medications:  acetaminophen 650 mg Oral Q6H PRN Emperatriz Rosas PA-C    ARIPiprazole 10 mg Oral Daily Artelia Shantel, DO    aspirin 81 mg Oral Daily Artelia Shantel, DO    benzonatate 200 mg Oral TID PRN Emperatriz Rosas PA-C    cefazolin 2,000 mg Intravenous Q8H Barry Foley MD Last Rate: Stopped (09/22/19 1347)   cholecalciferol 1,000 Units Oral Daily Artelia Shantel, DO    enoxaparin 1 mg/kg Subcutaneous Q12H [address] Fabián Barton MD    mupirocin  Topical TID Artelia Shantel, DO    oxybutynin 10 mg Oral Daily Artelia Shantel, DO    pantoprazole 40 mg Intravenous Q24H [address] Zo Burgos MD    sodium chloride (PF) 10 mL Intracatheter Daily Artelia Shantel, DO    venlafaxine 75 mg Oral TID Artelia Shantel, DO Today, Patient Was Seen By: Carlin Melgoza MD    ** Please Note: This note has been constructed using a voice recognition system   **

## 2019-09-23 LAB
ABO GROUP BLD BPU: NORMAL
ANION GAP SERPL CALCULATED.3IONS-SCNC: 11 MMOL/L (ref 4–13)
ANISOCYTOSIS BLD QL SMEAR: PRESENT
BACTERIA CATH TIP CULT: ABNORMAL
BASOPHILS # BLD MANUAL: 0 THOUSAND/UL (ref 0–0.1)
BASOPHILS NFR MAR MANUAL: 0 % (ref 0–1)
BPU ID: NORMAL
BUN SERPL-MCNC: 12 MG/DL (ref 5–25)
CALCIUM SERPL-MCNC: 9.2 MG/DL (ref 8.3–10.1)
CHLORIDE SERPL-SCNC: 105 MMOL/L (ref 100–108)
CO2 SERPL-SCNC: 22 MMOL/L (ref 21–32)
CREAT SERPL-MCNC: 0.62 MG/DL (ref 0.6–1.3)
CROSSMATCH: NORMAL
EOSINOPHIL # BLD MANUAL: 0 THOUSAND/UL (ref 0–0.4)
EOSINOPHIL NFR BLD MANUAL: 0 % (ref 0–6)
ERYTHROCYTE [DISTWIDTH] IN BLOOD BY AUTOMATED COUNT: 16.3 % (ref 11.6–15.1)
GFR SERPL CREATININE-BSD FRML MDRD: 97 ML/MIN/1.73SQ M
GLUCOSE SERPL-MCNC: 105 MG/DL (ref 65–140)
HCT VFR BLD AUTO: 23.9 % (ref 34.8–46.1)
HGB BLD-MCNC: 8.1 G/DL (ref 11.5–15.4)
LYMPHOCYTES # BLD AUTO: 1.68 THOUSAND/UL (ref 0.6–4.47)
LYMPHOCYTES # BLD AUTO: 9 % (ref 14–44)
MCH RBC QN AUTO: 28.4 PG (ref 26.8–34.3)
MCHC RBC AUTO-ENTMCNC: 33.9 G/DL (ref 31.4–37.4)
MCV RBC AUTO: 84 FL (ref 82–98)
METAMYELOCYTES NFR BLD MANUAL: 2 % (ref 0–1)
MONOCYTES # BLD AUTO: 0.93 THOUSAND/UL (ref 0–1.22)
MONOCYTES NFR BLD: 5 % (ref 4–12)
NEUTROPHILS # BLD MANUAL: 15.11 THOUSAND/UL (ref 1.85–7.62)
NEUTS SEG NFR BLD AUTO: 81 % (ref 43–75)
NRBC BLD AUTO-RTO: 0 /100 WBCS
PLATELET # BLD AUTO: 177 THOUSANDS/UL (ref 149–390)
PLATELET BLD QL SMEAR: ADEQUATE
PMV BLD AUTO: 12 FL (ref 8.9–12.7)
POIKILOCYTOSIS BLD QL SMEAR: PRESENT
POLYCHROMASIA BLD QL SMEAR: PRESENT
POTASSIUM SERPL-SCNC: 3 MMOL/L (ref 3.5–5.3)
PROMYELOCYTES NFR BLD MANUAL: 1 % (ref 0–0)
RBC # BLD AUTO: 2.85 MILLION/UL (ref 3.81–5.12)
RBC MORPH BLD: PRESENT
SODIUM SERPL-SCNC: 138 MMOL/L (ref 136–145)
TOXIC GRANULES BLD QL SMEAR: PRESENT
UNIT DISPENSE STATUS: NORMAL
UNIT PRODUCT CODE: NORMAL
UNIT RH: NORMAL
VARIANT LYMPHS # BLD AUTO: 2 %
WBC # BLD AUTO: 18.65 THOUSAND/UL (ref 4.31–10.16)

## 2019-09-23 PROCEDURE — 80048 BASIC METABOLIC PNL TOTAL CA: CPT | Performed by: HOSPITALIST

## 2019-09-23 PROCEDURE — 99233 SBSQ HOSP IP/OBS HIGH 50: CPT | Performed by: HOSPITALIST

## 2019-09-23 PROCEDURE — C9113 INJ PANTOPRAZOLE SODIUM, VIA: HCPCS | Performed by: HOSPITALIST

## 2019-09-23 PROCEDURE — 92526 ORAL FUNCTION THERAPY: CPT

## 2019-09-23 PROCEDURE — 85027 COMPLETE CBC AUTOMATED: CPT | Performed by: HOSPITALIST

## 2019-09-23 PROCEDURE — 99233 SBSQ HOSP IP/OBS HIGH 50: CPT | Performed by: OBSTETRICS & GYNECOLOGY

## 2019-09-23 PROCEDURE — 87040 BLOOD CULTURE FOR BACTERIA: CPT | Performed by: HOSPITALIST

## 2019-09-23 PROCEDURE — 85007 BL SMEAR W/DIFF WBC COUNT: CPT | Performed by: HOSPITALIST

## 2019-09-23 PROCEDURE — 99233 SBSQ HOSP IP/OBS HIGH 50: CPT | Performed by: INTERNAL MEDICINE

## 2019-09-23 RX ORDER — POTASSIUM CHLORIDE 20 MEQ/1
40 TABLET, EXTENDED RELEASE ORAL ONCE
Status: DISCONTINUED | OUTPATIENT
Start: 2019-09-23 | End: 2019-09-23

## 2019-09-23 RX ORDER — POTASSIUM CHLORIDE 20 MEQ/1
40 TABLET, EXTENDED RELEASE ORAL ONCE
Status: COMPLETED | OUTPATIENT
Start: 2019-09-23 | End: 2019-09-23

## 2019-09-23 RX ADMIN — PANTOPRAZOLE SODIUM 40 MG: 40 INJECTION, POWDER, FOR SOLUTION INTRAVENOUS at 09:30

## 2019-09-23 RX ADMIN — OXYBUTYNIN CHLORIDE 10 MG: 5 TABLET, EXTENDED RELEASE ORAL at 09:28

## 2019-09-23 RX ADMIN — MUPIROCIN: 20 OINTMENT TOPICAL at 22:32

## 2019-09-23 RX ADMIN — ENOXAPARIN SODIUM 90 MG: 100 INJECTION SUBCUTANEOUS at 05:35

## 2019-09-23 RX ADMIN — CEFAZOLIN SODIUM 2000 MG: 2 SOLUTION INTRAVENOUS at 13:17

## 2019-09-23 RX ADMIN — CEFAZOLIN SODIUM 2000 MG: 2 SOLUTION INTRAVENOUS at 22:32

## 2019-09-23 RX ADMIN — ENOXAPARIN SODIUM 90 MG: 100 INJECTION SUBCUTANEOUS at 17:05

## 2019-09-23 RX ADMIN — VENLAFAXINE 75 MG: 37.5 TABLET ORAL at 09:30

## 2019-09-23 RX ADMIN — CEFAZOLIN SODIUM 2000 MG: 2 SOLUTION INTRAVENOUS at 05:35

## 2019-09-23 RX ADMIN — ASPIRIN 81 MG: 81 TABLET, COATED ORAL at 09:28

## 2019-09-23 RX ADMIN — MUPIROCIN: 20 OINTMENT TOPICAL at 16:19

## 2019-09-23 RX ADMIN — VITAMIN D, TAB 1000IU (100/BT) 1000 UNITS: 25 TAB at 09:28

## 2019-09-23 RX ADMIN — ARIPIPRAZOLE 10 MG: 10 TABLET ORAL at 09:30

## 2019-09-23 RX ADMIN — MUPIROCIN 1 APPLICATION: 20 OINTMENT TOPICAL at 09:31

## 2019-09-23 RX ADMIN — POTASSIUM CHLORIDE 40 MEQ: 1500 TABLET, EXTENDED RELEASE ORAL at 09:28

## 2019-09-23 RX ADMIN — VENLAFAXINE 75 MG: 37.5 TABLET ORAL at 16:19

## 2019-09-23 RX ADMIN — VENLAFAXINE 75 MG: 37.5 TABLET ORAL at 22:32

## 2019-09-23 NOTE — ASSESSMENT & PLAN NOTE
· POA, baseline approximately 0 6  · Suspect in setting of sepsis, poor p o   Intake  · Received 3 L NS in ED however unfortunately with evidence of volume overload also got some lasix  · S/p IVF  · Resolved to 0 6  · monitor post IV contrast  · Hold quinapril, renally dose medications otherwise

## 2019-09-23 NOTE — PROGRESS NOTES
Progress Note - Infectious Disease   Alveta Tabby Pulcini 60 y o  female MRN: [id number]  Unit/Bed#: [facility] 912-01 Encounter: [id number]      Impression/Plan:    72-year-old female patient with endometrial cancer currently treated with chemotherapy through a right chest wall Port-A-Cath, presenting for fever and severe sepsis found to have MSSA BSI, port infection and right IJ septic phlebitis and possible pulmonary septic emboli     1- severe sepsis, POA:  With fever, hypotension that responded to IVF, [place]  Sepsis is secondary to #2  - supportive care and IVF as per primary team  - continue antibiotics as below        2- MSSA bacteremia and Port-A-Cath infection:  Blood culture collected on admission on 09/19/2019 is positive for Staph aureus in 1/1 set; Bcx 09/20 and 09/21/19 remain positive  TTE negative  CT chest showing right IJ septic thrombophlebitis    - repeat blood culture daily until bacteremia clears  - continue cefazolin   - do not insert a new port until the repeat blood culture is negative and patient is persistently afebrile  - repeat CBC, close clinical follow-up and serial exams  - TRINH to rule out IE      3- right IJ septic thrombophlebitis:  CT chest showing filling defect over right IJ  Also patient had erythema over the right IJ vein on admission  Patient on [facility]  - continue abx as above  Consider vascular evaluation  - continue close clinical monitoring    4- Endometrial cancer:  Last chemotherapy received 09/09/2019  - oncology follow-up        5- acute kidney injury POA:  Now resolved with Creat today 0 62 mg/dl  - repeat BMP  - avoid nephrotoxic meds     6- acute respiratory failure POA:   Chest x-ray showing vascular congestion  CT chest suggestive of possible septic emboli  Patient reports improvement in her shortness of breath  - monitor clinically    - continue abx as above     Case discussed with primary team    Antibiotics:  Antibiotics day 5  cefazolin day 2    Subjective:  Patient has no fever, chills, sweats; no nausea, vomiting, diarrhea; no cough, shortness of breath; no pain  No new symptoms  Objective:  Vitals:  Temp:  [98 1 °F (36 7 °C)-99 1 °F (37 3 °C)] 98 1 °F (36 7 °C)  HR:  [] 94  Resp:  [18-20] 20  BP: (113-125)/(60-70) 125/70  SpO2:  [95 %-100 %] 96 %  Temp (24hrs), Av 5 °F (36 9 °C), Min:98 1 °F (36 7 °C), Max:99 1 °F (37 3 °C)  Current: Temperature: 98 1 °F (36 7 °C)    Physical Exam:   General Appearance:  Alert, interactive, nontoxic, no acute distress  Throat: Oropharynx moist without lesions  Lungs:   Clear to auscultation bilaterally; no wheezes, rhonchi or rales; respirations unlabored   Heart:  RRR; no murmur, rub or gallop   Abdomen:   Soft, non-tender, non-distended, positive bowel sounds  Extremities: No clubbing, cyanosis or edema   Skin: No new rashes or lesions  No tenderness over right chest wall, at site of recently removed port  Incision is clean       Labs, Imaging, & Other studies:   All pertinent labs and imaging studies were personally reviewed  Results from last 7 days   Lab Units 19  0532 19  0514 19  0535   WBC Thousand/uL 18 65* 18 48* 17 59*   HEMOGLOBIN g/dL 8 1* 7 0* 7 0*   PLATELETS Thousands/uL 177 104* 89*     Results from last 7 days   Lab Units 19  0532 19  0530 19  0535  19  1711   SODIUM mmol/L 138 134* 132*   < > 129*   POTASSIUM mmol/L 3 0* 3 6 3 2*   < > 2 9*   CHLORIDE mmol/L 105 104 103   < > 97*   CO2 mmol/L 22 20* 18*   < > 18*   BUN mg/dL 12 14 22   < > 37*   CREATININE mg/dL 0 62 0 66 0 96   < > 1 91*   EGFR ml/min/1 73sq m 97 95 64   < > 28   CALCIUM mg/dL 9 2 9 3 8 8   < > 8 6   AST U/L  --   --   --   --  55*   ALT U/L  --   --   --   --  41   ALK PHOS U/L  --   --   --   --  91    < > = values in this interval not displayed       Results from last 7 days   Lab Units 19  0535 19  1058 19  1057 19  1731 19  1711   BLOOD CULTURE  Staphylococcus aureus* Staphylococcus aureus* Staphylococcus aureus*  --  Staphylococcus aureus*   GRAM STAIN RESULT  Gram positive cocci in clusters* Gram positive cocci in clusters* Gram positive cocci in clusters*  --  Gram positive cocci in clusters*   URINE CULTURE   --   --   --  60,000-69,000 cfu/ml Coagulase negative Staphylococcus species*  60,000-69,000 cfu/ml Corynebacterium jeikeium*  --      Results from last 7 days   Lab Units 09/22/19  0530 09/20/19  0515 09/19/19  1711   PROCALCITONIN ng/ml 0 92* 4 97* 4 39*

## 2019-09-23 NOTE — ASSESSMENT & PLAN NOTE
· On Eliquis for this, since NPO placed her on lovenox BID  · Given finding of RIJ thrombossi in CT scan with nodules in lungs, suspect septic thrombi with pulm emboli  · D/w GYN Onc team - will switch to Lovenox BID permanently

## 2019-09-23 NOTE — ASSESSMENT & PLAN NOTE
· Kept NPO, speech evaled - rec pureed but pt denying hence place on full liquid  · Cough could be from mild ongoing aspiration vs suspected septic emboli  · On RA

## 2019-09-23 NOTE — SOCIAL WORK
Lovenox script:     Dr Bessy Young gave CM script for Lovenox for a cost check  CM tubed script to [facility]  CM received call back from Alissa at [facility]; cost - $5 /month  CM notified Dr Bessy Young via tiger text  Pt is not yet medically cleared for discharge

## 2019-09-23 NOTE — PROGRESS NOTES
Gyn Oncology Progress note   Unknown Mick Melendez 60 y o  female MRN: [id number]  Unit/Bed#: [facility] 912-01 Encounter: [id number]    Assessment: 60 y o  with recurrent stage IB 1 endometrial cancer with biopsy proven evidence of right pelvic side wall recurrence, currently on Carbo/Taxol/Neulasta, admitted with sepsis secondary to Part-A-Cath infection     Plan:    1) Sepsis/Bacteremia:  Staphylococcus aureus bacteremia setting of port a catheter, removed tone 920, after blood culture results which insulin sensitive g positive cocci antibiotics changed to vancomycin day#4 to Ancef day #2  A febrile since 9/20  Last fever Tmax 102 9 1517 on 9/20  - nephrostomy tube scheduled to change on October 1st, urine output 0 76 ml/kg/h      2) Acute kidney injury  - possible status post septic shock  - creatinine levels back to baselines status post fluid resuscitation and Port-A cath removal  - K 3 6-> 3 0 today, will replete potassium with K tablet 40 mEq daily, if not tolerate my give IV    3) Pulmonary  - Bilateral lungs clear on auscultation  - comfortable and resting and in room air, with speech cough and dyspnea observed    4) Anemia:  Hemoglobin remains at 7, she transfuse if indicated    5) Metastatic endometrial cancer:  Hold chemotherapy            Subjective: Lizy Otero has no current complaints  Pain is well controlled with Tylenol  Patient is currently voiding  She is not ambulating  Patient is not currently passing flatus and has had no bowel movement  She is not tolerating PO, and denies nausea or vomitting  Patient denies fever, chills, chest pain, shortness of breath, or calf tenderness  /70   Pulse 93   Temp 98 6 °F (37 °C)   Resp 20   Wt 90 4 kg (199 lb 4 7 oz)   LMP  (LMP Unknown)   SpO2 96%   BMI 40 25 kg/m²     I/O last 3 completed shifts:   In: 900 [P O :540; I V :10; Blood:350]  Out: 5620 [Urine:3400; Drains:35]  I/O this shift:  In: 140 [I V :40; IV Piggyback:100]  Out: 650 [Urine:650]    Lab Results   Component Value Date    WBC 18 48 (H) 09/22/2019    HGB 7 0 (L) 09/22/2019    HCT 20 1 (L) 09/22/2019    MCV 83 09/22/2019     (L) 09/22/2019       Lab Results   Component Value Date    GLUCOSE 219 (H) 12/19/2017    CALCIUM 9 3 09/22/2019     10/27/2017    K 3 6 09/22/2019    CO2 20 (L) 09/22/2019     09/22/2019    BUN 14 09/22/2019    CREATININE 0 66 09/22/2019           Physical Exam   Constitutional: She is oriented to person, place, and time  She appears well-developed and well-nourished  No distress  Cardiovascular: Normal rate, regular rhythm and normal heart sounds  Exam reveals no friction rub  No murmur heard  Pulmonary/Chest: Effort normal and breath sounds normal    Dyspnea with talking, normal with rest   Abdominal: Soft  Musculoskeletal: Normal range of motion  Neurological: She is alert and oriented to person, place, and time  Skin: Skin is warm  She is not diaphoretic  Psychiatric: She has a normal mood and affect   Her behavior is normal          Lucius Howell MD  7/21/7319  7:86 AM

## 2019-09-23 NOTE — ASSESSMENT & PLAN NOTE
· POA with fevers, leukocytosis, tachycardia    Elevated procalcitonin on admission however with normal lactic acid level  · 2/2 MSSA bacteremia  · Vanco changed to IV Ancef  · Port a cath remnoved by IR - pocket was infected  · TTE performed - negative  · Repeat BC 9/20 are positive, BC 9/21 again coming back positive  · Ordered TRINH

## 2019-09-23 NOTE — ASSESSMENT & PLAN NOTE
· Evidence upon admission with tachypnea, dyspnea, hypoxia after IV fluid resuscitation for sepsis  · s/p 20 mg IV Lasix on admission  · Appears better now  · Wean O2 - on RA  · Hold off on further diuresis - use Prn  · CT chest has R>L effusion

## 2019-09-23 NOTE — PROGRESS NOTES
Progress note    A 77-year-old female patient with metastatic endometrial cancer, currently treating with severe sepsis, status post removal of port a catheter    1  Severe sepsis possible source port a catheter site   - A febrile since 9/20  - CT scan compatible with right internal jugular septic thrombophlebitis  - continue cefazolin  - transthoracic echocardiogram within normal limits, patient will have for transesophageal echocardiogram tomorrow to rule out infective endocarditis  - blood cultures compatible with methicillin sensitive Staph aureus, she was receiving vancomycin and antibiotic changed to cefazolin  - repeat blood culture daily  - Repeat CBC serial exams and close clinical follow-up    2  Acute kidney injury  - creatinine returned to baseline    3  Dyspnea on severe sepsis setting  - septic pulmonary emboli- on CT scan  - lungs clear on exam  - room air,  with speaking    4   Anemia  -  hemoglobin 8 1

## 2019-09-23 NOTE — ASSESSMENT & PLAN NOTE
· MSSA bacteremia  · Repeat BC 9/20 positive  · Port removed - pocket looked infected  · IV Vanco changed to IV ancef  · Repeat cultures 9/21-  Also positive  · Check TTE negative, needs TRINH

## 2019-09-23 NOTE — SPEECH THERAPY NOTE
Speech Language/Pathology    Speech/Language Pathology Progress Note    Patient Name: Birder Cabot  Today's Date: 9/23/2019      Subjective:  "Some things are just too dry  I am coughing more when I am talking now"  Objective:  Pt seen for dysphagia dx tx, remains NPO  Pt awake, alert, not coughing upon my entering the room  Currently on RA, sats 99%  Pt trialed w/ puree, soft solid, thin by cup/straw  Adequate retrieval & manipulation of the puree  Mildly reduced transfers w/ the solids w/ c/o oral dryness & noted coughing  She attempted a liquid wash to clear & requested no further solids  Pt's swallows are prompt, rise is wfl   NO overt wet voicing or cough w/ thin by cup or straw  Reviewed possible diet options w/ pt, pt did not want puree  Spoke w/ RD- will order supplements as the pt does not want solids at this time  Assessment:  Less overall coughing today w/ po intake  Pt w/ difficulty managing the solid material stating they make her cough  Plan/Recommendations:  Begin full liquids w/ supplements, ok for thin  SLP to trial /w upgraded material as able

## 2019-09-23 NOTE — PROGRESS NOTES
Progress Note - Kendra Castellano Pulcini 1955, 60 y o  female MRN: [id number]    Unit/Bed#: [facility] 912-01 Encounter: [id number]    Primary Care Provider: Selin Guadarrama DO   Date and time admitted to hospital: 9/19/2019  4:09 PM        Cough  Assessment & Plan  · Kept NPO, speech evaled - rec pureed but pt denying hence place on full liquid  · Cough could be from mild ongoing aspiration vs suspected septic emboli  · On RA    Acute respiratory failure (HCC)  Assessment & Plan  · Evidence upon admission with tachypnea, dyspnea, hypoxia after IV fluid resuscitation for sepsis  · s/p 20 mg IV Lasix on admission  · Appears better now  · Wean O2 - on RA  · Hold off on further diuresis - use Prn  · CT chest has R>L effusion    Bacteremia  Assessment & Plan  · MSSA bacteremia  · Repeat BC 9/20 positive  · Port removed - pocket looked infected  · IV Vanco changed to IV ancef  · Repeat cultures 9/21-  Also positive  · Check TTE negative, needs TRINH    Volume overload  Assessment & Plan  · Suspected by the admitting physician at the time of evaluation due to dyspnea, tachypnea, hypoxia  · Received 3 L of fluid for sepsis followed by 20 mg of IV Lasix  · No longer appears volume overload, mild tachypnea - hold IVF  · Hold off on further diuresis - use prn    Acute kidney injury ([address] )  Assessment & Plan  · POA, baseline approximately 0 6  · Suspect in setting of sepsis, poor p o   Intake  · Received 3 L NS in ED however unfortunately with evidence of volume overload also got some lasix  · S/p IVF  · Resolved to 0 6  · monitor post IV contrast  · Hold quinapril, renally dose medications otherwise    Anemia due to chemotherapy  Assessment & Plan  · Hemoglobin 7 5 -> 7 0 -> 7 0  · transfused 1 U PRBC - now 8 0    Acute deep vein thrombosis (DVT) of proximal vein of lower extremity (HCC)  Assessment & Plan  · On Eliquis for this, since NPO placed her on lovenox BID  · Given finding of RIJ thrombossi in CT scan with nodules in lungs, suspect septic thrombi with pulm emboli  · D/w GYN Onc team - will switch to Lovenox BID permanently    Endometrial cancer [facility])  Assessment & Plan  · Appreciate Gynecologic Oncology evaluation, she received Taxol/carboplatin and received last chemotherapy 09/09/2019  · Received granix 08/31/2019, also Neulasta 09/09/2019 and 09/16/2019  · Chemotherapy to be on hold for now given acute infection  · CT CAP - shows some improvement in areas of malignancy, R>L pleural effusion, nodules in lung which can be infectious/inflammatory    Benign essential hypertension  Assessment & Plan  · Holding quinapril for now given acute kidney injury & normal BPs  · Blood pressure monitoring per protocol    * Sepsis ([facility] )  Assessment & Plan  · POA with fevers, leukocytosis, tachycardia  Elevated procalcitonin on admission however with normal lactic acid level  · 2/2 MSSA bacteremia  · Vanco changed to IV Ancef  · Port a cath remnoved by IR - pocket was infected  · TTE performed - negative  · Repeat [facility] 9/20 are positive, BC 9/21 again coming back positive  · Ordered TRINH        [facility] Internal Medicine Progress Note  Patient: Mindi Rinaldi Pulcini 60 y o  female   MRN: [id number]  PCP: Pam Painting DO  [facility] Encounter: [id number]  Date Of Visit: 09/23/19    Assessment:    Principal Problem:    Sepsis ([facility] )  Active Problems:    Benign essential hypertension    Endometrial cancer ([facility] )    Acute deep vein thrombosis (DVT) of proximal vein of lower extremity ([facility] )    Anemia due to chemotherapy    Acute kidney injury ([facility] )    Volume overload    Hypokalemia    Hyponatremia    Bacteremia    Acute respiratory failure (HCC)    Cough           VTE Pharmacologic Prophylaxis:   Pharmacologic: Enoxaparin (Lovenox)  Mechanical VTE Prophylaxis in Place: Yes    Patient Centered Rounds: I have performed bedside rounds with nursing staff today      Discussions with Specialists or Other Care Team Provider: ARMANDO Raines    Education and Discussions with Family / Patient: patient    Time Spent for Care: 45 minutes  More than 50% of total time spent on counseling and coordination of care as described above  Current Length of Stay: 4 day(s)    Current Patient Status: Inpatient   Certification Statement: The patient will continue to require additional inpatient hospital stay due to not ready    Discharge Plan / Estimated Discharge Date:     Code Status: Level 1 - Full Code      Subjective:   Little better today    Objective:     Vitals:   Temp (24hrs), Av 4 °F (36 9 °C), Min:98 1 °F (36 7 °C), Max:98 9 °F (37 2 °C)    Temp:  [98 1 °F (36 7 °C)-98 9 °F (37 2 °C)] 98 1 °F (36 7 °C)  HR:  [86-99] 86  Resp:  [18-20] 18  BP: (114-151)/(64-82) 151/82  SpO2:  [95 %-97 %] 97 %  Body mass index is 40 25 kg/m²  Input and Output Summary (last 24 hours): Intake/Output Summary (Last 24 hours) at 2019 1730  Last data filed at 2019 1707  Gross per 24 hour   Intake 540 ml   Output 1325 ml   Net -785 ml       Physical Exam:     Physical Exam   Constitutional: She is oriented to person, place, and time  She appears well-developed and well-nourished  HENT:   Head: Normocephalic and atraumatic  Eyes: Conjunctivae are normal    Cardiovascular: Normal rate and regular rhythm  Exam reveals no friction rub  No murmur heard  Pulmonary/Chest: Effort normal and breath sounds normal  No stridor  No respiratory distress  Abdominal: Soft  She exhibits no distension  There is no tenderness  Neurological: She is alert and oriented to person, place, and time  Vitals reviewed        Additional Data:     Labs:    Results from last 7 days   Lab Units 19  0532 19  0514   WBC Thousand/uL 18 65* 18 48*   HEMOGLOBIN g/dL 8 1* 7 0*   HEMATOCRIT % 23 9* 20 1*   PLATELETS Thousands/uL 177 104*   NEUTROS PCT %  --  74   LYMPHS PCT %  --  9*   LYMPHO PCT % 9*  --    MONOS PCT %  --  9   MONO PCT % 5  --    EOS PCT % 0 0     Results from last 7 days   Lab Units 09/23/19  0532  09/19/19  1711   POTASSIUM mmol/L 3 0*   < > 2 9*   CHLORIDE mmol/L 105   < > 97*   CO2 mmol/L 22   < > 18*   BUN mg/dL 12   < > 37*   CREATININE mg/dL 0 62   < > 1 91*   CALCIUM mg/dL 9 2   < > 8 6   ALK PHOS U/L  --   --  91   ALT U/L  --   --  41   AST U/L  --   --  55*    < > = values in this interval not displayed  Results from last 7 days   Lab Units 09/20/19  1742   INR  2 10*       * I Have Reviewed All Lab Data Listed Above  * Additional Pertinent Lab Tests Reviewed: All Labs Within Last 24 Hours Reviewed    Imaging:    Imaging Reports Reviewed Today Include:   Imaging Personally Reviewed by Myself Includes:      Recent Cultures (last 7 days):     Results from last 7 days   Lab Units 09/21/19  0536 09/21/19  0535 09/20/19  1058 09/20/19  1057 09/19/19  1731 09/19/19  1711   BLOOD CULTURE  No Growth at 48 hrs   Staphylococcus aureus* Staphylococcus aureus* Staphylococcus aureus*  --  Staphylococcus aureus*   GRAM STAIN RESULT   --  Gram positive cocci in clusters* Gram positive cocci in clusters* Gram positive cocci in clusters*  --  Gram positive cocci in clusters*   URINE CULTURE   --   --   --   --  60,000-69,000 cfu/ml Coagulase negative Staphylococcus species*  60,000-69,000 cfu/ml Corynebacterium jeikeium*  --        Last 24 Hours Medication List:     Current Facility-Administered Medications:  acetaminophen 650 mg Oral Q6H PRN Emperatriz Rosas PA-C    ARIPiprazole 10 mg Oral Daily Dareen Pew, DO    aspirin 81 mg Oral Daily Dareen Pew, DO    benzonatate 200 mg Oral TID PRN Emperatriz Rosas PA-C    cefazolin 2,000 mg Intravenous Q8H Dinorah Lind MD Last Rate: Stopped (09/23/19 1401)   cholecalciferol 1,000 Units Oral Daily Dareen Pew, DO    enoxaparin 1 mg/kg Subcutaneous Q12H [facility] Shawna Andujar MD    mupirocin  Topical TID Dareen Pew, DO    oxybutynin 10 mg Oral Daily Dareen Pew, DO    pantoprazole 40 mg Intravenous Q24H Rajani White MD sodium chloride (PF) 10 mL Intracatheter Daily Farida Child, DO    venlafaxine 75 mg Oral TID Farida Child, DO         Today, Patient Was Seen By: Julieth Milligan MD    ** Please Note: This note has been constructed using a voice recognition system   **

## 2019-09-23 NOTE — ASSESSMENT & PLAN NOTE
· Appreciate Gynecologic Oncology evaluation, she received Taxol/carboplatin and received last chemotherapy 09/09/2019  · Received granix 08/31/2019, also Neulasta 09/09/2019 and 09/16/2019  · Chemotherapy to be on hold for now given acute infection  · CT CAP - shows some improvement in areas of malignancy, R>L pleural effusion, nodules in lung which can be infectious/inflammatory

## 2019-09-24 ENCOUNTER — APPOINTMENT (INPATIENT)
Dept: NON INVASIVE DIAGNOSTICS | Facility: HOSPITAL | Age: 64
DRG: 314 | End: 2019-09-24
Attending: HOSPITALIST
Payer: COMMERCIAL

## 2019-09-24 ENCOUNTER — ANESTHESIA (INPATIENT)
Dept: NON INVASIVE DIAGNOSTICS | Facility: HOSPITAL | Age: 64
DRG: 314 | End: 2019-09-24
Payer: COMMERCIAL

## 2019-09-24 LAB
ANION GAP SERPL CALCULATED.3IONS-SCNC: 8 MMOL/L (ref 4–13)
BACTERIA BLD CULT: ABNORMAL
BASOPHILS # BLD AUTO: 0.14 THOUSANDS/ΜL (ref 0–0.1)
BASOPHILS NFR BLD AUTO: 1 % (ref 0–1)
BUN SERPL-MCNC: 11 MG/DL (ref 5–25)
CALCIUM SERPL-MCNC: 8.6 MG/DL (ref 8.3–10.1)
CHLORIDE SERPL-SCNC: 100 MMOL/L (ref 100–108)
CO2 SERPL-SCNC: 24 MMOL/L (ref 21–32)
CREAT SERPL-MCNC: 0.54 MG/DL (ref 0.6–1.3)
EOSINOPHIL # BLD AUTO: 0.01 THOUSAND/ΜL (ref 0–0.61)
EOSINOPHIL NFR BLD AUTO: 0 % (ref 0–6)
ERYTHROCYTE [DISTWIDTH] IN BLOOD BY AUTOMATED COUNT: 16.6 % (ref 11.6–15.1)
GFR SERPL CREATININE-BSD FRML MDRD: 101 ML/MIN/1.73SQ M
GLUCOSE SERPL-MCNC: 94 MG/DL (ref 65–140)
GRAM STN SPEC: ABNORMAL
HCT VFR BLD AUTO: 22.7 % (ref 34.8–46.1)
HGB BLD-MCNC: 7.5 G/DL (ref 11.5–15.4)
IMM GRANULOCYTES # BLD AUTO: >0.5 THOUSAND/UL (ref 0–0.2)
IMM GRANULOCYTES NFR BLD AUTO: 8 % (ref 0–2)
LYMPHOCYTES # BLD AUTO: 1.78 THOUSANDS/ΜL (ref 0.6–4.47)
LYMPHOCYTES NFR BLD AUTO: 9 % (ref 14–44)
MCH RBC QN AUTO: 28.5 PG (ref 26.8–34.3)
MCHC RBC AUTO-ENTMCNC: 33 G/DL (ref 31.4–37.4)
MCV RBC AUTO: 86 FL (ref 82–98)
MONOCYTES # BLD AUTO: 1.68 THOUSAND/ΜL (ref 0.17–1.22)
MONOCYTES NFR BLD AUTO: 9 % (ref 4–12)
NEUTROPHILS # BLD AUTO: 14.03 THOUSANDS/ΜL (ref 1.85–7.62)
NEUTS SEG NFR BLD AUTO: 73 % (ref 43–75)
NRBC BLD AUTO-RTO: 0 /100 WBCS
PLATELET # BLD AUTO: 223 THOUSANDS/UL (ref 149–390)
PMV BLD AUTO: 10.6 FL (ref 8.9–12.7)
POTASSIUM SERPL-SCNC: 3 MMOL/L (ref 3.5–5.3)
RBC # BLD AUTO: 2.63 MILLION/UL (ref 3.81–5.12)
SODIUM SERPL-SCNC: 132 MMOL/L (ref 136–145)
WBC # BLD AUTO: 19.1 THOUSAND/UL (ref 4.31–10.16)

## 2019-09-24 PROCEDURE — 87040 BLOOD CULTURE FOR BACTERIA: CPT | Performed by: INTERNAL MEDICINE

## 2019-09-24 PROCEDURE — 80048 BASIC METABOLIC PNL TOTAL CA: CPT | Performed by: HOSPITALIST

## 2019-09-24 PROCEDURE — 99232 SBSQ HOSP IP/OBS MODERATE 35: CPT | Performed by: OBSTETRICS & GYNECOLOGY

## 2019-09-24 PROCEDURE — 99255 IP/OBS CONSLTJ NEW/EST HI 80: CPT | Performed by: INTERNAL MEDICINE

## 2019-09-24 PROCEDURE — 99233 SBSQ HOSP IP/OBS HIGH 50: CPT | Performed by: INTERNAL MEDICINE

## 2019-09-24 PROCEDURE — 85025 COMPLETE CBC W/AUTO DIFF WBC: CPT | Performed by: HOSPITALIST

## 2019-09-24 PROCEDURE — NC001 PR NO CHARGE: Performed by: INTERNAL MEDICINE

## 2019-09-24 PROCEDURE — C9113 INJ PANTOPRAZOLE SODIUM, VIA: HCPCS | Performed by: HOSPITALIST

## 2019-09-24 PROCEDURE — 93312 ECHO TRANSESOPHAGEAL: CPT

## 2019-09-24 PROCEDURE — 99232 SBSQ HOSP IP/OBS MODERATE 35: CPT | Performed by: INTERNAL MEDICINE

## 2019-09-24 RX ORDER — POTASSIUM CHLORIDE 20 MEQ/1
40 TABLET, EXTENDED RELEASE ORAL 2 TIMES DAILY
Status: DISCONTINUED | OUTPATIENT
Start: 2019-09-24 | End: 2019-09-29

## 2019-09-24 RX ORDER — SODIUM CHLORIDE 9 MG/ML
INJECTION, SOLUTION INTRAVENOUS CONTINUOUS PRN
Status: DISCONTINUED | OUTPATIENT
Start: 2019-09-24 | End: 2019-09-24 | Stop reason: SURG

## 2019-09-24 RX ORDER — PROPOFOL 10 MG/ML
INJECTION, EMULSION INTRAVENOUS AS NEEDED
Status: DISCONTINUED | OUTPATIENT
Start: 2019-09-24 | End: 2019-09-24 | Stop reason: SURG

## 2019-09-24 RX ORDER — LORAZEPAM 1 MG/1
1 TABLET ORAL ONCE
Status: COMPLETED | OUTPATIENT
Start: 2019-09-24 | End: 2019-09-24

## 2019-09-24 RX ORDER — LORAZEPAM 1 MG/1
1 TABLET ORAL EVERY 6 HOURS PRN
Status: DISCONTINUED | OUTPATIENT
Start: 2019-09-24 | End: 2019-09-29 | Stop reason: HOSPADM

## 2019-09-24 RX ORDER — POLYETHYLENE GLYCOL 3350 17 G/17G
17 POWDER, FOR SOLUTION ORAL DAILY
Status: DISCONTINUED | OUTPATIENT
Start: 2019-09-24 | End: 2019-09-29 | Stop reason: HOSPADM

## 2019-09-24 RX ORDER — KETAMINE HYDROCHLORIDE 50 MG/ML
INJECTION, SOLUTION, CONCENTRATE INTRAMUSCULAR; INTRAVENOUS AS NEEDED
Status: DISCONTINUED | OUTPATIENT
Start: 2019-09-24 | End: 2019-09-24 | Stop reason: SURG

## 2019-09-24 RX ADMIN — VENLAFAXINE 75 MG: 37.5 TABLET ORAL at 22:51

## 2019-09-24 RX ADMIN — PROPOFOL 40 MG: 10 INJECTION, EMULSION INTRAVENOUS at 09:49

## 2019-09-24 RX ADMIN — VENLAFAXINE 75 MG: 37.5 TABLET ORAL at 09:02

## 2019-09-24 RX ADMIN — POTASSIUM CHLORIDE 40 MEQ: 1500 TABLET, EXTENDED RELEASE ORAL at 17:19

## 2019-09-24 RX ADMIN — LORAZEPAM 1 MG: 1 TABLET ORAL at 22:54

## 2019-09-24 RX ADMIN — CEFAZOLIN SODIUM 2000 MG: 2 SOLUTION INTRAVENOUS at 13:39

## 2019-09-24 RX ADMIN — ASPIRIN 81 MG: 81 TABLET, COATED ORAL at 09:02

## 2019-09-24 RX ADMIN — PROPOFOL 40 MG: 10 INJECTION, EMULSION INTRAVENOUS at 09:54

## 2019-09-24 RX ADMIN — PROPOFOL 40 MG: 10 INJECTION, EMULSION INTRAVENOUS at 09:56

## 2019-09-24 RX ADMIN — ENOXAPARIN SODIUM 90 MG: 100 INJECTION SUBCUTANEOUS at 17:16

## 2019-09-24 RX ADMIN — POLYETHYLENE GLYCOL 3350 17 G: 17 POWDER, FOR SOLUTION ORAL at 13:43

## 2019-09-24 RX ADMIN — KETAMINE HYDROCHLORIDE 10 MG: 50 INJECTION, SOLUTION INTRAMUSCULAR; INTRAVENOUS at 09:50

## 2019-09-24 RX ADMIN — MUPIROCIN: 20 OINTMENT TOPICAL at 22:51

## 2019-09-24 RX ADMIN — ENOXAPARIN SODIUM 90 MG: 100 INJECTION SUBCUTANEOUS at 05:01

## 2019-09-24 RX ADMIN — PROPOFOL 60 MG: 10 INJECTION, EMULSION INTRAVENOUS at 09:48

## 2019-09-24 RX ADMIN — SODIUM CHLORIDE: 0.9 INJECTION, SOLUTION INTRAVENOUS at 09:32

## 2019-09-24 RX ADMIN — LORAZEPAM 1 MG: 1 TABLET ORAL at 05:35

## 2019-09-24 RX ADMIN — KETAMINE HYDROCHLORIDE 10 MG: 50 INJECTION, SOLUTION INTRAMUSCULAR; INTRAVENOUS at 09:48

## 2019-09-24 RX ADMIN — PROPOFOL 40 MG: 10 INJECTION, EMULSION INTRAVENOUS at 09:52

## 2019-09-24 RX ADMIN — OXYBUTYNIN CHLORIDE 10 MG: 5 TABLET, EXTENDED RELEASE ORAL at 09:02

## 2019-09-24 RX ADMIN — MUPIROCIN: 20 OINTMENT TOPICAL at 16:34

## 2019-09-24 RX ADMIN — CEFAZOLIN SODIUM 2000 MG: 2 SOLUTION INTRAVENOUS at 05:02

## 2019-09-24 RX ADMIN — PROPOFOL 40 MG: 10 INJECTION, EMULSION INTRAVENOUS at 09:58

## 2019-09-24 RX ADMIN — CEFAZOLIN SODIUM 2000 MG: 2 SOLUTION INTRAVENOUS at 22:51

## 2019-09-24 RX ADMIN — PANTOPRAZOLE SODIUM 40 MG: 40 INJECTION, POWDER, FOR SOLUTION INTRAVENOUS at 09:02

## 2019-09-24 RX ADMIN — ARIPIPRAZOLE 10 MG: 10 TABLET ORAL at 09:02

## 2019-09-24 RX ADMIN — SODIUM CHLORIDE 10 ML: 9 INJECTION, SOLUTION INTRAMUSCULAR; INTRAVENOUS; SUBCUTANEOUS at 09:02

## 2019-09-24 RX ADMIN — VENLAFAXINE 75 MG: 37.5 TABLET ORAL at 16:34

## 2019-09-24 RX ADMIN — VITAMIN D, TAB 1000IU (100/BT) 1000 UNITS: 25 TAB at 09:02

## 2019-09-24 RX ADMIN — PROPOFOL 60 MG: 10 INJECTION, EMULSION INTRAVENOUS at 09:50

## 2019-09-24 NOTE — ASSESSMENT & PLAN NOTE
· POA with fevers, leukocytosis, tachycardia    Elevated procalcitonin on admission however with normal lactic acid level  · 2/2 MSSA bacteremia  · Vanco changed to IV Ancef  · Port a cath removed by IR - pocket was infected  · TTE performed - negative  · Repeat BC 9/20 are positive, BC 9/21 again coming back positive  · Ordered TRINH No

## 2019-09-24 NOTE — PROGRESS NOTES
Progress Note - Infectious Disease   Teresa Ramsey Pulcini 60 y o  female MRN: [id number]  Unit/Bed#: [facility] 912-01 Encounter: [id number]      Impression/Plan:    17-year-old female patient with endometrial cancer currently treated with chemotherapy through a right chest wall Port-A-Cath, presenting for fever and severe sepsis found to have MSSA BSI, port infection and right IJ septic phlebitis and possible pulmonary septic emboli     1- severe sepsis, POA:  With fever, hypotension that responded to IVF, [place]  Sepsis is secondary to #2  - supportive care and IVF as per primary team  - continue antibiotics as below        2- Complicated MSSA bacteremia and Port-A-Cath infection:  Blood culture collected on admission on 09/19/2019 is positive for Staph aureus in 1/1 set; Bcx 09/20 and 09/21/19 remain positive; Bcx 09/23/19 sent  TTE negative  TRINH unsuccessful  CT chest showing right IJ septic thrombophlebitis     - continue cefazolin; follow up result of Bcx 09/23/19  - do not insert a new port until the repeat blood culture is negative and patient is persistently afebrile   - repeat CBC, close clinical follow-up and serial exams  - as risk of TRINH is high because of possible esophageal pathology, can hold off on repeating TRINH for now  Patient will need 6 weeks of iv abx therapy due to septic thrombophlebitis        3- right IJ septic thrombophlebitis:  CT chest showing filling defect over right IJ  Also patient had erythema over the right IJ vein on admission  Patient on [facility]  - continue abx as above  Consider vascular evaluation  - continue close clinical monitoring     4- Endometrial cancer:  Last chemotherapy received 09/09/2019  - oncology follow-up        5- acute kidney injury POA:  Now resolved with Creat today 0 54 mg/dl  - repeat BMP  - avoid nephrotoxic meds     6- acute respiratory failure POA:   Chest x-ray showing vascular congestion   CT chest suggestive of possible septic emboli  Patient reports improvement in her shortness of breath  - monitor clinically  - continue abx as above     Case discussed with primary team     Antibiotics:  Antibiotics day 6  cefazolin day 3      Subjective:  Patient has no fever, chills, sweats; no nausea, vomiting, diarrhea; no cough, shortness of breath; no pain  No new symptoms  Reports overall imporvement    Objective:  Vitals:  Temp:  [98 1 °F (36 7 °C)-98 2 °F (36 8 °C)] 98 1 °F (36 7 °C)  HR:  [] 96  Resp:  [18-20] 19  BP: (115-151)/(60-82) 115/60  SpO2:  [95 %-97 %] 97 %  Temp (24hrs), Av 1 °F (36 7 °C), Min:98 1 °F (36 7 °C), Max:98 2 °F (36 8 °C)  Current: Temperature: 98 1 °F (36 7 °C)    Physical Exam:   General Appearance:  Alert, interactive, nontoxic, no acute distress  Throat: Oropharynx moist without lesions  Lungs:   Clear to auscultation bilaterally; no wheezes, rhonchi or rales; respirations unlabored   Heart:  RRR; no murmur, rub or gallop   Abdomen:   Soft, non-tender, non-distended, positive bowel sounds  Right side PCN in place, draining clear urine   Extremities: No clubbing, cyanosis or edema   Skin: No new rashes or lesions  No draining wounds noted  Site of previous port in right chest wall is not tender and not swollen          Labs, Imaging, & Other studies:   All pertinent labs and imaging studies were personally reviewed  Results from last 7 days   Lab Units 19  0532 19  0514   WBC Thousand/uL 19 10* 18 65* 18 48*   HEMOGLOBIN g/dL 7 5* 8 1* 7 0*   PLATELETS Thousands/uL 223 177 104*     Results from last 7 days   Lab Units 19  0532 19  0530  19  1711   SODIUM mmol/L 132* 138 134*   < > 129*   POTASSIUM mmol/L 3 0* 3 0* 3 6   < > 2 9*   CHLORIDE mmol/L 100 105 104   < > 97*   CO2 mmol/L 24 22 20*   < > 18*   BUN mg/dL 11 12 14   < > 37*   CREATININE mg/dL 0 54* 0 62 0 66   < > 1 91*   EGFR ml/min/1 73sq m 101 97 95   < > 28   CALCIUM mg/dL 8 6 9 2 9 3   < > 8 6   AST U/L  --   --   -- --  55*   ALT U/L  --   --   --   --  41   ALK PHOS U/L  --   --   --   --  91    < > = values in this interval not displayed  Results from last 7 days   Lab Units 09/21/19  0536 09/21/19  0535 09/20/19  1058 09/20/19  1057 09/19/19  1731 09/19/19  1711   BLOOD CULTURE  No Growth at 48 hrs   Staphylococcus aureus* Staphylococcus aureus* Staphylococcus aureus*  --  Staphylococcus aureus*   GRAM STAIN RESULT   --  Gram positive cocci in clusters* Gram positive cocci in clusters* Gram positive cocci in clusters*  --  Gram positive cocci in clusters*   URINE CULTURE   --   --   --   --  60,000-69,000 cfu/ml Coagulase negative Staphylococcus species*  60,000-69,000 cfu/ml Corynebacterium jeikeium*  --      Results from last 7 days   Lab Units 09/22/19  0530 09/20/19  0515 09/19/19  1711   PROCALCITONIN ng/ml 0 92* 4 97* 4 39*

## 2019-09-24 NOTE — ANESTHESIA PREPROCEDURE EVALUATION
Review of Systems/Medical History  Patient summary reviewed  Chart reviewed  No history of anesthetic complications     Cardiovascular  Negative cardio ROS Hyperlipidemia, Hypertension ,    Pulmonary  Negative pulmonary ROS        GI/Hepatic  Negative GI/hepatic ROS   GERD ,   Comment: Gastric Bypass     Negative  ROS        Endo/Other  Negative endo/other ROS Diabetes type 2 Oral agent,   Obesity  morbid obesity   GYN  Negative gynecology ROS   Hysterectomy, Uterine cancer (endometrial Ca),        Hematology  Negative hematology ROS   Coagulation disorder currently taking oral anticoagulants,    Musculoskeletal  Negative musculoskeletal ROS        Neurology  Negative neurology ROS      Psychology   Negative psychology ROS Depression ,          Prev Anes - Diagnostic Lap 12/19/17: Grade 1 view with MAC 3 blade, MV not attempted (RSI)    TTE 9/21/19: LVEF 65%, no rWMA, increased wall thickness, grade 1 DD, RV fn nl, no valvular pathology although study unable to exclude possibility of vegetation    EKG 12/19/2017: NSR, LAD    CT c/a/p 9/22/2019:  1  No definite evidence of metastatic disease in the chest   New scattered bilateral pulmonary nodules measuring up to 4 mm and new nodular opacities in the left lower lobe, nonspecific and likely infectious/inflammatory  However, continued   surveillance as per the patient's oncologic imaging protocol is recommended to document stability/resolution  2   New small right and trace left pleural effusions with associated atelectasis  3   Decreased size of the right pelvic mass with persistent encasement/abutment of the right iliac vessels and probable occlusion of the right external iliac vein  Decreased right iliac chain lymphadenopathy  4   Interval resolution of right hydronephrosis status post placement of a percutaneous nephrostomy tube and ureteral stent  CXR 9/19/19: Right chest wall power injectable port with Bailey needle in place    Catheter tip at the cavoatrial junction  Appearance is unchanged  Heart size within normal limits for portable technique  Mild aortic tortuosity  Pulmonary vascular indistinctness with mild cephalization  No pneumothorax, effusion, or focal consolidation  Few scattered Kerley B lines  Bones are unremarkable  ID progress note 9/23/19:  2- MSSA bacteremia and Port-A-Cath infection:  Blood culture collected on admission on 09/19/2019 is positive for Staph aureus in 1/1 set; Bcx 09/20 and 09/21/19 remain positive  TTE negative  CT chest showing right IJ septic thrombophlebitis     - repeat blood culture daily until bacteremia clears  - continue cefazolin   - do not insert a new port until the repeat blood culture is negative and patient is persistently afebrile   - repeat CBC, close clinical follow-up and serial exams  - TRINH to rule out     Recent Results (from the past 48 hour(s))   Vancomycin, trough Draw level peripherally  Give dose immediately after trough (do NOT hold dose)  Call Pharmacy with any questions/concerns (Pharm Consult)      Collection Time: 09/22/19  9:18 AM   Result Value Ref Range    Vancomycin Tr 16 7 10 0 - 20 0 ug/mL   Type and screen    Collection Time: 09/22/19 10:48 AM   Result Value Ref Range    ABO Grouping O     Rh Factor Positive     Antibody Screen Negative     Specimen Expiration Date [id number]    Basic metabolic panel    Collection Time: 09/23/19  5:32 AM   Result Value Ref Range    Sodium 138 136 - 145 mmol/L    Potassium 3 0 (L) 3 5 - 5 3 mmol/L    Chloride 105 100 - 108 mmol/L    CO2 22 21 - 32 mmol/L    ANION GAP 11 4 - 13 mmol/L    BUN 12 5 - 25 mg/dL    Creatinine 0 62 0 60 - 1 30 mg/dL    Glucose 105 65 - 140 mg/dL    Calcium 9 2 8 3 - 10 1 mg/dL    eGFR 97 ml/min/1 73sq m   CBC and differential    Collection Time: 09/23/19  5:32 AM   Result Value Ref Range    WBC 18 65 (H) 4 31 - 10 16 Thousand/uL    RBC 2 85 (L) 3 81 - 5 12 Million/uL    Hemoglobin 8 1 (L) 11 5 - 15 4 g/dL    Hematocrit 23 9 (L) 34 8 - 46 1 %    MCV 84 82 - 98 fL    MCH 28 4 26 8 - 34 3 pg    MCHC 33 9 31 4 - 37 4 g/dL    RDW 16 3 (H) 11 6 - 15 1 %    MPV 12 0 8 9 - 12 7 fL    Platelets 637 043 - 783 Thousands/uL    nRBC 0 /100 WBCs   Manual Differential(PHLEBS Do Not Order)    Collection Time: 09/23/19  5:32 AM   Result Value Ref Range    Segmented % 81 (H) 43 - 75 %    Lymphocytes % 9 (L) 14 - 44 %    Monocytes % 5 4 - 12 %    Eosinophils, % 0 0 - 6 %    Basophils % 0 0 - 1 %    Metamyelocytes% 2 (H) 0 - 1 %    Promyelocytes % 1 (H) 0 - 0 %    Atypical Lymphocytes % 2 (H) <=0 %    Absolute Neutrophils 15 11 (H) 1 85 - 7 62 Thousand/uL    Lymphocytes Absolute 1 68 0 60 - 4 47 Thousand/uL    Monocytes Absolute 0 93 0 00 - 1 22 Thousand/uL    Eosinophils Absolute 0 00 0 00 - 0 40 Thousand/uL    Basophils Absolute 0 00 0 00 - 0 10 Thousand/uL    Total Counted      Toxic Granulation Present     RBC Morphology Present     Anisocytosis Present     Poikilocytes Present     Polychromasia Present     Platelet Estimate Adequate Adequate   Prepare RBC:Has consent been obtained?  Yes, 1 Units    Collection Time: 09/23/19  5:55 AM   Result Value Ref Range    Unit Product Code S2047D25     Unit Number [id number]     Unit ABO O     Unit RH NEG     Crossmatch Compatible     Unit Dispense Status Presumed Trans    CBC and differential    Collection Time: 09/24/19  4:42 AM   Result Value Ref Range    WBC 19 10 (H) 4 31 - 10 16 Thousand/uL    RBC 2 63 (L) 3 81 - 5 12 Million/uL    Hemoglobin 7 5 (L) 11 5 - 15 4 g/dL    Hematocrit 22 7 (L) 34 8 - 46 1 %    MCV 86 82 - 98 fL    MCH 28 5 26 8 - 34 3 pg    MCHC 33 0 31 4 - 37 4 g/dL    RDW 16 6 (H) 11 6 - 15 1 %    MPV 10 6 8 9 - 12 7 fL    Platelets 442 752 - 380 Thousands/uL    nRBC 0 /100 WBCs    Neutrophils Relative 73 43 - 75 %    Immat GRANS % 8 (H) 0 - 2 %    Lymphocytes Relative 9 (L) 14 - 44 %    Monocytes Relative 9 4 - 12 %    Eosinophils Relative 0 0 - 6 %    Basophils Relative 1 0 - 1 % Neutrophils Absolute 14 03 (H) 1 85 - 7 62 Thousands/µL    Immature Grans Absolute >0 50 (H) 0 00 - 0 20 Thousand/uL    Lymphocytes Absolute 1 78 0 60 - 4 47 Thousands/µL    Monocytes Absolute 1 68 (H) 0 17 - 1 22 Thousand/µL    Eosinophils Absolute 0 01 0 00 - 0 61 Thousand/µL    Basophils Absolute 0 14 (H) 0 00 - 0 10 Thousands/µL   Basic metabolic panel    Collection Time: 09/24/19  4:42 AM   Result Value Ref Range    Sodium 132 (L) 136 - 145 mmol/L    Potassium 3 0 (L) 3 5 - 5 3 mmol/L    Chloride 100 100 - 108 mmol/L    CO2 24 21 - 32 mmol/L    ANION GAP 8 4 - 13 mmol/L    BUN 11 5 - 25 mg/dL    Creatinine 0 54 (L) 0 60 - 1 30 mg/dL    Glucose 94 65 - 140 mg/dL    Calcium 8 6 8 3 - 10 1 mg/dL    eGFR 101 ml/min/1 73sq m       Physical Exam    Airway    Mallampati score: II  TM Distance: >3 FB  Neck ROM: limited     Dental   No notable dental hx     Cardiovascular  Comment: Negative ROS, Rhythm: regular, Rate: abnormal, No murmur,     Pulmonary  Breath sounds clear to auscultation,     Other Findings        Anesthesia Plan  ASA Score- 4     Anesthesia Type- IV sedation with anesthesia with ASA Monitors  Additional Monitors:   Airway Plan:         Plan Factors-    Induction-     Postoperative Plan-     Informed Consent- Anesthetic plan and risks discussed with patient  I personally reviewed this patient with the CRNA  Discussed and agreed on the Anesthesia Plan with the CRNA  Levy Bauer

## 2019-09-24 NOTE — ASSESSMENT & PLAN NOTE
· Kept NPO, speech evaled - rec pureed but pt denying hence place on full liquid  · Speech and swallow evaluation for dysphagia, stricture  · Cough could be from mild ongoing aspiration vs suspected septic emboli  · On RA

## 2019-09-24 NOTE — CONSULTS
Consultation - [address] [facility]  Kendra Edgargiselle 60 y o  female MRN: [id number]  Unit/Bed#: [facility] 502-47 Encounter: [id number]              Inpatient consult to gastroenterology     Performed by  Mei Kelly DO     Authorized by Kaylen Roman MD              Reason for Consult / Principal Problem:  Dysphagia     ASSESSMENT:  Dolores Cross is a 60y o  year old female past medical history of endometrial cancer with metastasis status post chemo radiation x3 and hysterectomy in 2017, hyperlipidemia, diabetes mellitus type 2, depression, gastric bypass in 2001, chronic iron deficiency anemia who admitted for sepsis secondary to MSSA bacteremia  GI was consulted for evaluation after TRINH was unable to be performed due to unable to pass probe passed distal oropharynx  PLAN:     Dysphagia  Patient's symptoms which include cough, lower pitched voice suggests that causes more of oropharynx in origin  However, may have undiagnosed GERD contributing to a possible stricture  Evaluated by speech, continue the recommendations on full liquid diet  Will order EGD tomorrow to evaluate for esophageal stricture  NPO after midnight  Depending on findings, patient will likely need outpatient esophageal manometry or barium swallow  Continue to monitor patient's cough  Constipation  Patient states that her last bowel movement was 5 days ago  Likely related to hospital admission, decreased mobility  Encouraged physical activity  MiraLax ordered daily  Continue monitor for bowel movements    All other medical problems can be managed as per primary team    HPI: Dolores Cross is a 60y o  year old female past medical history of endometrial cancer with metastasis status post chemo radiation x3 and hysterectomy in 2017, hyperlipidemia, diabetes mellitus type 2, depression, gastric bypass in 2001, chronic iron deficiency anemia who admitted for sepsis secondary to MSSA bacteremia    GI was consulted for evaluation after TRINH was unable to be performed due to unable to pass probe passed distal oropharynx  Patient states that she has been having coughing spells since June  She recently began a 3rd chemotherapy regimen which also began in June  She notes that this cough has worsened since admission which she has had since June  Appears to be unrelated to her eating habits  Denies any acid reflux history or burning sensations in her chest   Does note that she has occasional gagging with no vomiting, which she attributes to her chemotherapy  She denies any productive cough, hemoptysis, hematemesis  The was done in 2017 to re-evaluate her gastric bypass, which showed normal findings  She is able to tolerate soft foods, however does have issues with solid foods  She denies any sensation of food getting stuck  She also reports a change in her voice which has become softer in pitch  She year also reports not having a bowel movement for 5 days, which for her is not normal   She normally has a bowel movement every 2 days  Her last 1 was on Thursday, reported to be dark brown with normal consistency  She denies any melena or hematochezia, weight loss, night sweats  Her last colonoscopy was ago, however unable to find a report in chart  Patient states that she was to schedule colonoscopy soon, however this was complicated by CT findings that showed metastasis of her cancer  She denies any family history of colon or gastric cancer, however father was diagnosed with non-Hodgkin lymphoma and her mother with ovarian cancer  She denies any other acute complaints at the time including chest pain, shortness of breath, abdominal pain, numbness, tingling  Review of Systems   Constitutional: Positive for fatigue  Negative for appetite change, chills, fever and unexpected weight change  HENT: Positive for voice change  Negative for congestion, sneezing and sore throat      Eyes: Negative for photophobia and visual disturbance  Respiratory: Positive for cough  Negative for choking, chest tightness and shortness of breath  Cardiovascular: Negative for chest pain, palpitations and leg swelling  Gastrointestinal: Positive for constipation and nausea  Negative for abdominal distention, abdominal pain, anal bleeding, blood in stool, diarrhea, rectal pain and vomiting  Genitourinary: Negative for dysuria and frequency  Musculoskeletal: Negative for back pain and myalgias  Neurological: Negative for syncope, speech difficulty, numbness and headaches  Psychiatric/Behavioral: Negative for behavioral problems       All the systems reviewed and were negative    Historical Information   Past Medical History:   Diagnosis Date    Anemia     Chemotherapy follow-up examination     Depression     Diabetes mellitus ([facility] [address]     Endometrial cancer ([facility] [address] ) 12/2017    Hyperglycemia     vx type 2 dm -- last assessed 4/1/14; resolved 11/7/17    Hyperlipidemia     Hypertension     Obesity     last assessed 10/14/17; resolved 11/7/17     Past Surgical History:   Procedure Laterality Date    ABDOMINAL SURGERY      GASTRIC BYPASS    CHOLECYSTECTOMY      at the time of gastric bypass    COLONOSCOPY      CT NEEDLE BIOPSY LYMPH NODE  7/8/2019    GASTRIC BYPASS      HYSTERECTOMY Bilateral     total abdominal with salpingo-oophorectomy    IR PORT PLACEMENT  7/26/2019    IR PORT REMOVAL  9/20/2019    IR TUBE PLACEMENT NEPHROSTOMY  7/26/2019    OOPHORECTOMY Bilateral     MN LAP, RADICAL HYST W/ TUBE&OV, NODE BX N/A 12/19/2017    Procedure: HYSTERECTOMY [address]OSCOPIC TOTAL ([address]) W/ ROBOTICS; BILATERAL SALPINGOOPHERECTOMY; LYMPH NODE DISSECTION; lysis of adhesions;  Surgeon: Sharyn Sebastian MD;  [facility]: [facility];  Service: Gynecology Oncology    MN LAP,DIAGNOSTIC ABDOMEN N/A 12/19/2017    Procedure: LAPAROSCOPY DIAGNOSTIC;  Surgeon: Sharyn Sebastian MD;  [facility];  Service: Gynecology Oncology    TONSILLECTOMY      US GUIDED BREAST BIOPSY RIGHT COMPLETE Right 6/28/2019     Social History   Social History     Substance and Sexual Activity   Alcohol Use Not Currently     Social History     Substance and Sexual Activity   Drug Use No     Social History     Tobacco Use   Smoking Status Never Smoker   Smokeless Tobacco Never Used     Family History   Problem Relation Age of Onset    Other Mother         dyslipidemia    Ovarian cancer Mother 48    Lymphoma Father     Bone cancer Maternal Grandfather     No Known Problems Sister     No Known Problems Maternal Grandmother     No Known Problems Paternal Grandmother     No Known Problems Paternal Grandfather     No Known Problems Maternal Aunt     No Known Problems Maternal Aunt     No Known Problems Maternal Aunt     No Known Problems Maternal Aunt     No Known Problems Paternal Aunt     No Known Problems Paternal Aunt     No Known Problems Paternal Aunt     No Known Problems Paternal Aunt     No Known Problems Brother     No Known Problems Brother        Meds/Allergies     Medications Prior to Admission   Medication    ARIPiprazole (ABILIFY) 10 mg tablet    aspirin (ECOTRIN LOW STRENGTH) 81 mg EC tablet    cholecalciferol (VITAMIN D3) 1,000 units tablet    Ferrous Sulfate (IRON) 28 MG TABS    gemfibrozil (LOPID) 600 mg tablet    LORazepam (ATIVAN) 1 mg tablet    mupirocin (BACTROBAN) 2 % ointment    omeprazole (PriLOSEC) 20 mg delayed release capsule    ondansetron (ZOFRAN) 8 mg tablet    oxybutynin (DITROPAN-XL) 10 MG 24 hr tablet    quinapril (ACCUPRIL) 5 mg tablet    rivaroxaban (XARELTO) 20 mg tablet    venlafaxine (EFFEXOR) 75 mg tablet    CRANIAL PROSTHESIS, RX,    dexmethylphenidate (FOCALIN XR) 10 MG 24 hr capsule    lactulose 20 g/30 mL    MYRBETRIQ 50 MG TB24    sodium chloride, PF, 0 9 %     Current Facility-Administered Medications   Medication Dose Route Frequency    acetaminophen (TYLENOL) tablet 650 mg  650 mg Oral Q6H PRN    ARIPiprazole (ABILIFY) tablet 10 mg  10 mg Oral Daily    aspirin (ECOTRIN LOW STRENGTH) EC tablet 81 mg  81 mg Oral Daily    benzonatate (TESSALON PERLES) capsule 200 mg  200 mg Oral TID PRN    ceFAZolin (ANCEF) IVPB (premix) 2,000 mg  2,000 mg Intravenous Q8H    cholecalciferol (VITAMIN D3) tablet 1,000 Units  1,000 Units Oral Daily    enoxaparin (LOVENOX) subcutaneous injection 90 mg  1 mg/kg Subcutaneous Q12H [facility]    mupirocin (BACTROBAN) 2 % ointment   Topical TID    oxybutynin (DITROPAN-XL) 24 hr tablet 10 mg  10 mg Oral Daily    pantoprazole (PROTONIX) injection 40 mg  40 mg Intravenous Q24H CHRISTIANO    sodium chloride (PF) 0 9 % injection 10 mL  10 mL Intracatheter Daily    venlafaxine (EFFEXOR) tablet 75 mg  75 mg Oral TID       Allergies   Allergen Reactions    Cephalosporins Rash     Which Cephalosporin reaction was to not specified; however, has tolerated Amoxicillin, Cefazolin, and Cefepime       Objective     Blood pressure 115/60, pulse 96, temperature 98 1 °F (36 7 °C), resp  rate 19, weight 89 1 kg (196 lb 6 9 oz), SpO2 97 %, not currently breastfeeding  Intake/Output Summary (Last 24 hours) at 9/24/2019 1123  Last data filed at 9/24/2019 1003  Gross per 24 hour   Intake 1080 ml   Output 1200 ml   Net -120 ml       Physical Exam:   GENERAL: NAD, appears frail, speaks in low volume with intermittent coughing  HEENT:  NC/AT, EOMI, MMM, no scleral icterus, oropharynx moist  Mallampatti 3, no excessive erythema or exudates  CARDIAC:  RRR, +S1/S2, no S3/S4 heard, no m/g/r  PULMONARY:  CTA B/L, no wheezing/rales/rhonci, non-labored breathing  ABDOMEN:  Soft, NT/ND, +BS, no rebound/guarding/rigidity  Extremities:  2+ Pulses in DP/PT  No edema, cyanosis, or clubbing  NEUROLOGIC:  Alert/oriented x3  No motor or sensory deficits  SKIN:  No rashes or erythema    Lab Results:   No results displayed because visit has over 200 results          Imaging Studies: I have personally reviewed pertinent reports  Counseling / Coordination of Care  Total time spent today  30 minutes  Greater than 50% of total time was spent with the patient and / or family counseling and / or coordination of care  [address] [employer]    Internal Medicine PGY-1  9/24/2019 12:29 PM

## 2019-09-24 NOTE — PROGRESS NOTES
Gyn Oncology Progress note   Debra Melendez 60 y o  female MRN: [id number]  Unit/Bed#: [facility] 912-01 Encounter: [id number]    Assessment: 60 y o  recurrent stage IB endometrial cancer with biopsy proven right pelvic sidewall recurrence, second-line chemotherapy, positive blood culture on 9/20,9/21 status post port A catheter removal     Plan:    1) Septic shock- Methicillin sensitive Staph aureus septicemia  - MSSA bacteremia setting of port A catheter  - catheter removed, continue daily blood culture blood culture was positive for Staph aureus 9/20,9/21 will follow up daily blood cultures until negative results  - Cefazolin day #3  - Last fever on 9/20  - CT scan on 9/23 compatible with septic emboli, transthoracic echocardiogram within normal limits, will follow up trans esophageal echocardiogram  - patient has right nephrostomy tube, will change on October 1st, scheduled    2) Acute kidney injury  - Status post septic shock  - Patient basal creatinine level 0 6,  fluctuate on setting of septic shock(1 91->1 6->0 9->0 6), now creatinine levels 0 54      3) Respiratory failure  - Patient has dyspnea with speaking, unable to tolerate solid and started liquid yesterday, speech therapist follow-up  - Yesterday she was tolerating puree and liquids  - Overall complaining less coughing and dyspnea since yesterday  - Lungs clear to auscultate bilateral  - Patient is in room air, SaO2 97%    4) Anemia  - Hemoglobin 7 5->7 0-> 1 packed red blood cell 8 1-> 7 5 today    5) DVT prophylaxis  - Lovenox 90mg b i d  She was on Xarelto previously  6) Benign essential hypertension  - Quinapril holding, due to acute kidney injury and normal blood pressures  - blood pressures since yesterday 140-150/80 s, may restart anti hypertensive, normal creatinine levels    7) Physical deconditioning   - PT /OT recommend          Subjective: Sofia Nick has no current complaints  Pain is well controlled with     Patient is currently voiding  She is not ambulating  Patient is currently passing flatus and has had no bowel movement  She is tolerating liquid since yesterday, and denies nausea or vomitting  Patient denies fever, chills, chest pain, shortness of breath, or calf tenderness  /82   Pulse 90   Temp 98 2 °F (36 8 °C)   Resp 20   Wt 89 1 kg (196 lb 6 9 oz)   LMP  (LMP Unknown)   SpO2 95%   BMI 39 67 kg/m²     I/O last 3 completed shifts: In: 540 [I V :40; Blood:350; IV Piggyback:150]  Out: 2175 [Urine:2175]  I/O this shift: In: 80 [P O :600; I V :80; IV Piggyback:100]  Out: 525 [Urine:525]    Lab Results   Component Value Date    WBC 19 10 (H) 09/24/2019    HGB 7 5 (L) 09/24/2019    HCT 22 7 (L) 09/24/2019    MCV 86 09/24/2019     09/24/2019       Lab Results   Component Value Date    GLUCOSE 219 (H) 12/19/2017    CALCIUM 8 6 09/24/2019     10/27/2017    K 3 0 (L) 09/24/2019    CO2 24 09/24/2019     09/24/2019    BUN 11 09/24/2019    CREATININE 0 54 (L) 09/24/2019           Physical Exam   Constitutional: She is oriented to person, place, and time  She appears well-developed and well-nourished  No distress  Cardiovascular: Normal rate, regular rhythm and normal heart sounds  Pulmonary/Chest: Effort normal and breath sounds normal    Abdominal: Soft  Neurological: She is alert and oriented to person, place, and time  Skin: Skin is dry  She is not diaphoretic  Psychiatric: She has a normal mood and affect   Her behavior is normal          Mohan Velasquez MD  7/88/7702  1:45 AM

## 2019-09-24 NOTE — ASSESSMENT & PLAN NOTE
· Evidence upon admission with tachypnea, dyspnea, hypoxia after IV fluid resuscitation for sepsis  · s/p 20 mg IV Lasix on admission  · Improved now  · Weaning off O2 - now on RA  · Hold off on further diuresis - use Prn  · CT chest has R>L effusion

## 2019-09-24 NOTE — ASSESSMENT & PLAN NOTE
· Eliquis on hold for  since NPO placed her on lovenox BID  · Given finding of RIJ thrombossi in CT scan with nodules in lungs, suspect septic thrombi with pulm emboli  · D/w GYN Onc team - will switch to Lovenox BID permanently

## 2019-09-24 NOTE — PROGRESS NOTES
Progress Note - Samuel Melendez 1955, 60 y o  female MRN: [id number]    Unit/Bed#: [facility] 912-01 Encounter: [id number]    Primary Care Provider: Analia Garcia DO   Date and time admitted to hospital: 9/19/2019  4:09 PM        Cough  Assessment & Plan  · Kept NPO, speech evaled - rec pureed but pt denying hence place on full liquid  · Speech and swallow evaluation for dysphagia, stricture  · Cough could be from mild ongoing aspiration vs suspected septic emboli  · On RA    Acute respiratory failure (HCC)  Assessment & Plan  · Evidence upon admission with tachypnea, dyspnea, hypoxia after IV fluid resuscitation for sepsis  · s/p 20 mg IV Lasix on admission  · Improved now  · Weaning off O2 - now on RA  · Hold off on further diuresis - use Prn  · CT chest has R>L effusion    Hyponatremia  Assessment & Plan  · Sodium 129 on admission  · Hypovolemic hypernatremia  · Status is IV fluids followed by Lasix  · Now 134    Hypokalemia  Assessment & Plan  · K 2 9  · After repletion, now 3 2, replete today    Volume overload  Assessment & Plan  · Suspected by the admitting physician at the time of evaluation due to dyspnea, tachypnea, hypoxia  · Received 3 L of fluid for sepsis followed by 20 mg of IV Lasix  · No longer appears volume overload, mild tachypnea - hold IVF  · Hold off on further diuresis - use prn    Acute kidney injury ([facility] [address] )  Assessment & Plan  · POA, baseline approximately 0 6  · Suspect in setting of sepsis, poor p o   Intake  · Received 3 L NS in ED however unfortunately with evidence of volume overload also got some lasix  · S/p IVF  · Resolved to 0 6  · monitor post IV contrast  · Hold quinapril, renally dose medications otherwise    Anemia due to chemotherapy  Assessment & Plan  · Hemoglobin 7 5 -> 7 0 -> 7 0  · transfused 1 U PRBC - now 8 0    Acute deep vein thrombosis (DVT) of proximal vein of lower extremity (HCC)  Assessment & Plan  · Eliquis on hold for  since NPO placed her on lovenox BID  · Given finding of RIJ thrombossi in CT scan with nodules in lungs, suspect septic thrombi with pulm emboli  · D/w GYN Onc team - will switch to Lovenox BID permanently    Endometrial cancer [facility])  Assessment & Plan  · Appreciate Gynecologic Oncology evaluation, she received Taxol/carboplatin and received last chemotherapy 09/09/2019  · Received granix 08/31/2019, also Neulasta 09/09/2019 and 09/16/2019  · Chemotherapy to be on hold for now given acute infection  · CT CAP - shows some improvement in areas of malignancy, R>L pleural effusion, nodules in lung which can be infectious/inflammatory    Benign essential hypertension  Assessment & Plan  · Holding quinapril for now given acute kidney injury & normal BPs  · Blood pressure monitoring per protocol    * Sepsis ([facility] [address] )  Assessment & Plan  · POA with fevers, leukocytosis, tachycardia  Elevated procalcitonin on admission however with normal lactic acid level  · 2/2 MSSA bacteremia  · Vanco changed to IV Ancef  · Port a cath removed by IR - pocket was infected  · TTE performed - negative  · Repeat BC 9/20 are positive, BC 9/21 again coming back positive  · Ordered TRINH           VTE Pharmacologic Prophylaxis:   Pharmacologic: Enoxaparin (Lovenox)  Mechanical VTE Prophylaxis in Place: Yes    Patient Centered Rounds: I have performed bedside rounds with nursing staff today  Discussions with Specialists or Other Care Team Provider: GI, ID    Education and Discussions with Family / Patient: patient     Time Spent for Care: 45 minutes  More than 50% of total time spent on counseling and coordination of care as described above      Current Length of Stay: 5 day(s)    Current Patient Status: Inpatient   Certification Statement: The patient will continue to require additional inpatient hospital stay due to MSSA line infection     Discharge Plan: pending antibiotic duration and possible TRINH?GI work up pending    Code Status: Level 1 - Full Code      Subjective:   Doing well  Still having difficulty swallowing  No pain at this time  Objective:     Vitals:   Temp (24hrs), Av 3 °F (36 8 °C), Min:98 1 °F (36 7 °C), Max:98 6 °F (37 °C)    Temp:  [98 1 °F (36 7 °C)-98 6 °F (37 °C)] 98 6 °F (37 °C)  HR:  [] 106  Resp:  [18-20] 18  BP: (115-146)/(60-82) 124/79  SpO2:  [94 %-97 %] 94 %  Body mass index is 39 67 kg/m²  Input and Output Summary (last 24 hours): Intake/Output Summary (Last 24 hours) at 2019 1721  Last data filed at 2019 1447  Gross per 24 hour   Intake 1150 ml   Output 1275 ml   Net -125 ml       Physical Exam:     Physical Exam   Constitutional: She is oriented to person, place, and time  She appears well-developed and well-nourished  HENT:   Head: Normocephalic and atraumatic  Right Ear: External ear normal    Left Ear: External ear normal    Nose: Nose normal    Mouth/Throat: Oropharynx is clear and moist    Eyes: Pupils are equal, round, and reactive to light  Conjunctivae and EOM are normal  Right eye exhibits no discharge  Left eye exhibits no discharge  No scleral icterus  Neck: Normal range of motion  Neck supple  Cardiovascular: Normal rate, regular rhythm and normal heart sounds  Exam reveals no gallop and no friction rub  No murmur heard  Pulmonary/Chest: Effort normal and breath sounds normal  No stridor  No respiratory distress  She has no wheezes  She has no rales  She exhibits no tenderness  Abdominal: Soft  Bowel sounds are normal  She exhibits distension  She exhibits no mass  There is no tenderness  Neurological: She is alert and oriented to person, place, and time  She displays normal reflexes  No cranial nerve deficit or sensory deficit  She exhibits normal muscle tone  Coordination normal    Skin: Skin is warm and dry  Nursing note and vitals reviewed        Additional Data:     Labs:    Results from last 7 days   Lab Units 19  0442   WBC Thousand/uL 19 10*   HEMOGLOBIN g/dL 7 5*   HEMATOCRIT % 22 7* PLATELETS Thousands/uL 223   NEUTROS PCT % 73   LYMPHS PCT % 9*   MONOS PCT % 9   EOS PCT % 0     Results from last 7 days   Lab Units 09/24/19  0442  09/19/19  1711   POTASSIUM mmol/L 3 0*   < > 2 9*   CHLORIDE mmol/L 100   < > 97*   CO2 mmol/L 24   < > 18*   BUN mg/dL 11   < > 37*   CREATININE mg/dL 0 54*   < > 1 91*   CALCIUM mg/dL 8 6   < > 8 6   ALK PHOS U/L  --   --  91   ALT U/L  --   --  41   AST U/L  --   --  55*    < > = values in this interval not displayed  Results from last 7 days   Lab Units 09/20/19  1742   INR  2 10*       * I Have Reviewed All Lab Data Listed Above  * Additional Pertinent Lab Tests Reviewed: [address] Admission Reviewed    Imaging:    Imaging Reports Reviewed Today Include:    Imaging Personally Reviewed by Myself Includes:       Recent Cultures (last 7 days):     Results from last 7 days   Lab Units 09/23/19  1353 09/23/19  1338 09/21/19  0536 09/21/19  0535 09/20/19  1058 09/20/19  1057 09/19/19  1731 09/19/19  1711   BLOOD CULTURE  No Growth at 24 hrs  No Growth at 24 hrs  No Growth at 72 hrs   Staphylococcus aureus* Staphylococcus aureus* Staphylococcus aureus*  --  Staphylococcus aureus*   GRAM STAIN RESULT   --   --   --  Gram positive cocci in clusters* Gram positive cocci in clusters* Gram positive cocci in clusters*  --  Gram positive cocci in clusters*   URINE CULTURE   --   --   --   --   --   --  60,000-69,000 cfu/ml Coagulase negative Staphylococcus species*  60,000-69,000 cfu/ml Corynebacterium jeikeium*  --        Last 24 Hours Medication List:     Current Facility-Administered Medications:  acetaminophen 650 mg Oral Q6H PRN Emperatriz Rosas PA-C    ARIPiprazole 10 mg Oral Daily Oneta Escort, DO    aspirin 81 mg Oral Daily Oneta Escort, DO    benzonatate 200 mg Oral TID PRN Emperatriz Rosas PA-C    cefazolin 2,000 mg Intravenous Q8H Jason Stewart MD Last Rate: 2,000 mg (09/24/19 1339)   cholecalciferol 1,000 Units Oral Daily Oneta Escort, DO enoxaparin 1 mg/kg Subcutaneous Q12H [address] Bryce Vidal MD    LORazepam 1 mg Oral Q6H PRN John Resendiz MD    mupirocin  Topical TID Cori Ravalli, DO    oxybutynin 10 mg Oral Daily Cori Ravalli, DO    pantoprazole 40 mg Intravenous Q24H [address] Zo Burgos MD    polyethylene glycol 17 g Oral Daily [facility], DO    potassium chloride 40 mEq Oral BID John Resendiz MD    sodium chloride (PF) 10 mL Intracatheter Daily Cori Ravalli, DO    venlafaxine 75 mg Oral TID Cori Ravalli, DO         Today, Patient Was Seen By: John Resendiz MD    ** Please Note: Dictation voice to text software may have been used in the creation of this document   **

## 2019-09-24 NOTE — PROGRESS NOTES
Day Fitzpatrick is a 60 y o  y o  female PMHx significant for endometrial cancer currently on chemotherapy, DVT on anticoagulation,  admitted with sepsis found to have MSSA in blood culture with request for TRINH to evaluate for possible endocarditis  Patient was seen and evaluated  She noted history of gastric bypass surgery in 2001, but denied any esophageal problems or difficulty swallowing but stated that she was coughing lately with swallowing and was recently switched to full liquid diet  She denied any loose or false teeth  Risks and benefits were explained to patient and she was agreeable to proceed  Consent was signed  The patient was again identified in the Echo lab  A time out procedure was performed  The patient was sedated by the anesthesia service with ketamine and propofol per anesthesia protocol  After adequate sedation, a TRINH was attempted x3 with difficulty passing probe past the distal oropharynx  In this setting and patient recent history of coughing with swallowing, decision was made to abort procedure  We will discuss with primary team and request Gastroenterology evaluation prior to any further attempts at TRINH  The patient was monitored for the appropriate time interval in the holding area, and was transferred back to the medical floor in stable condition      Agnes Sandhoff, DO

## 2019-09-24 NOTE — ANESTHESIA POSTPROCEDURE EVALUATION
Post-Op Assessment Note    CV Status:  Stable    Pain management: adequate     Mental Status:  Alert and awake   Hydration Status:  Euvolemic   PONV Controlled:  Controlled   Airway Patency:  Patent   Post Op Vitals Reviewed: Yes      Staff: CRNA, Anesthesiologist           BP   117/65   Temp      Pulse 101   Resp   14   SpO2   100

## 2019-09-25 ENCOUNTER — APPOINTMENT (INPATIENT)
Dept: NON INVASIVE DIAGNOSTICS | Facility: HOSPITAL | Age: 64
DRG: 314 | End: 2019-09-25
Attending: HOSPITALIST
Payer: COMMERCIAL

## 2019-09-25 ENCOUNTER — ANESTHESIA EVENT (INPATIENT)
Dept: GASTROENTEROLOGY | Facility: HOSPITAL | Age: 64
DRG: 314 | End: 2019-09-25
Payer: COMMERCIAL

## 2019-09-25 ENCOUNTER — APPOINTMENT (INPATIENT)
Dept: GASTROENTEROLOGY | Facility: HOSPITAL | Age: 64
DRG: 314 | End: 2019-09-25
Payer: COMMERCIAL

## 2019-09-25 ENCOUNTER — ANESTHESIA (INPATIENT)
Dept: GASTROENTEROLOGY | Facility: HOSPITAL | Age: 64
DRG: 314 | End: 2019-09-25
Payer: COMMERCIAL

## 2019-09-25 PROBLEM — B95.61 MSSA BACTEREMIA: Status: ACTIVE | Noted: 2019-09-20

## 2019-09-25 LAB
ANION GAP SERPL CALCULATED.3IONS-SCNC: 8 MMOL/L (ref 4–13)
BASOPHILS # BLD AUTO: 0.14 THOUSANDS/ΜL (ref 0–0.1)
BASOPHILS NFR BLD AUTO: 1 % (ref 0–1)
BUN SERPL-MCNC: 14 MG/DL (ref 5–25)
CALCIUM SERPL-MCNC: 8.8 MG/DL (ref 8.3–10.1)
CHLORIDE SERPL-SCNC: 102 MMOL/L (ref 100–108)
CO2 SERPL-SCNC: 25 MMOL/L (ref 21–32)
CREAT SERPL-MCNC: 0.71 MG/DL (ref 0.6–1.3)
EOSINOPHIL # BLD AUTO: 0 THOUSAND/ΜL (ref 0–0.61)
EOSINOPHIL NFR BLD AUTO: 0 % (ref 0–6)
ERYTHROCYTE [DISTWIDTH] IN BLOOD BY AUTOMATED COUNT: 17.2 % (ref 11.6–15.1)
GFR SERPL CREATININE-BSD FRML MDRD: 92 ML/MIN/1.73SQ M
GLUCOSE SERPL-MCNC: 89 MG/DL (ref 65–140)
HCT VFR BLD AUTO: 23.1 % (ref 34.8–46.1)
HGB BLD-MCNC: 7.3 G/DL (ref 11.5–15.4)
IMM GRANULOCYTES # BLD AUTO: >0.5 THOUSAND/UL (ref 0–0.2)
IMM GRANULOCYTES NFR BLD AUTO: 10 % (ref 0–2)
LYMPHOCYTES # BLD AUTO: 1.6 THOUSANDS/ΜL (ref 0.6–4.47)
LYMPHOCYTES NFR BLD AUTO: 9 % (ref 14–44)
MCH RBC QN AUTO: 28.1 PG (ref 26.8–34.3)
MCHC RBC AUTO-ENTMCNC: 31.6 G/DL (ref 31.4–37.4)
MCV RBC AUTO: 89 FL (ref 82–98)
MONOCYTES # BLD AUTO: 1.71 THOUSAND/ΜL (ref 0.17–1.22)
MONOCYTES NFR BLD AUTO: 10 % (ref 4–12)
NEUTROPHILS # BLD AUTO: 12.07 THOUSANDS/ΜL (ref 1.85–7.62)
NEUTS SEG NFR BLD AUTO: 70 % (ref 43–75)
NRBC BLD AUTO-RTO: 0 /100 WBCS
PLATELET # BLD AUTO: 250 THOUSANDS/UL (ref 149–390)
PMV BLD AUTO: 11.4 FL (ref 8.9–12.7)
POTASSIUM SERPL-SCNC: 3.8 MMOL/L (ref 3.5–5.3)
RBC # BLD AUTO: 2.6 MILLION/UL (ref 3.81–5.12)
SODIUM SERPL-SCNC: 135 MMOL/L (ref 136–145)
WBC # BLD AUTO: 17.15 THOUSAND/UL (ref 4.31–10.16)

## 2019-09-25 PROCEDURE — 43235 EGD DIAGNOSTIC BRUSH WASH: CPT | Performed by: INTERNAL MEDICINE

## 2019-09-25 PROCEDURE — 99233 SBSQ HOSP IP/OBS HIGH 50: CPT | Performed by: OBSTETRICS & GYNECOLOGY

## 2019-09-25 PROCEDURE — 0DJ08ZZ INSPECTION OF UPPER INTESTINAL TRACT, VIA NATURAL OR ARTIFICIAL OPENING ENDOSCOPIC: ICD-10-PCS | Performed by: INTERNAL MEDICINE

## 2019-09-25 PROCEDURE — 99232 SBSQ HOSP IP/OBS MODERATE 35: CPT | Performed by: INTERNAL MEDICINE

## 2019-09-25 PROCEDURE — C9113 INJ PANTOPRAZOLE SODIUM, VIA: HCPCS | Performed by: HOSPITALIST

## 2019-09-25 PROCEDURE — 80048 BASIC METABOLIC PNL TOTAL CA: CPT | Performed by: INTERNAL MEDICINE

## 2019-09-25 PROCEDURE — 92526 ORAL FUNCTION THERAPY: CPT

## 2019-09-25 PROCEDURE — 99233 SBSQ HOSP IP/OBS HIGH 50: CPT | Performed by: INTERNAL MEDICINE

## 2019-09-25 PROCEDURE — 85025 COMPLETE CBC W/AUTO DIFF WBC: CPT | Performed by: INTERNAL MEDICINE

## 2019-09-25 RX ORDER — LIDOCAINE HYDROCHLORIDE 10 MG/ML
INJECTION, SOLUTION INFILTRATION; PERINEURAL AS NEEDED
Status: DISCONTINUED | OUTPATIENT
Start: 2019-09-25 | End: 2019-09-25 | Stop reason: SURG

## 2019-09-25 RX ORDER — PROPOFOL 10 MG/ML
INJECTION, EMULSION INTRAVENOUS AS NEEDED
Status: DISCONTINUED | OUTPATIENT
Start: 2019-09-25 | End: 2019-09-25 | Stop reason: SURG

## 2019-09-25 RX ORDER — SODIUM CHLORIDE 9 MG/ML
INJECTION, SOLUTION INTRAVENOUS CONTINUOUS PRN
Status: DISCONTINUED | OUTPATIENT
Start: 2019-09-25 | End: 2019-09-25 | Stop reason: SURG

## 2019-09-25 RX ADMIN — PANTOPRAZOLE SODIUM 40 MG: 40 INJECTION, POWDER, FOR SOLUTION INTRAVENOUS at 09:40

## 2019-09-25 RX ADMIN — PROPOFOL 150 MG: 10 INJECTION, EMULSION INTRAVENOUS at 12:35

## 2019-09-25 RX ADMIN — VENLAFAXINE 75 MG: 37.5 TABLET ORAL at 09:40

## 2019-09-25 RX ADMIN — SODIUM CHLORIDE: 0.9 INJECTION, SOLUTION INTRAVENOUS at 12:08

## 2019-09-25 RX ADMIN — SODIUM CHLORIDE 10 ML: 9 INJECTION, SOLUTION INTRAMUSCULAR; INTRAVENOUS; SUBCUTANEOUS at 09:40

## 2019-09-25 RX ADMIN — MUPIROCIN: 20 OINTMENT TOPICAL at 18:23

## 2019-09-25 RX ADMIN — VENLAFAXINE 75 MG: 37.5 TABLET ORAL at 21:59

## 2019-09-25 RX ADMIN — ASPIRIN 81 MG: 81 TABLET, COATED ORAL at 09:39

## 2019-09-25 RX ADMIN — PROPOFOL 50 MG: 10 INJECTION, EMULSION INTRAVENOUS at 12:38

## 2019-09-25 RX ADMIN — MUPIROCIN: 20 OINTMENT TOPICAL at 22:00

## 2019-09-25 RX ADMIN — CEFAZOLIN SODIUM 2000 MG: 2 SOLUTION INTRAVENOUS at 05:54

## 2019-09-25 RX ADMIN — CEFAZOLIN SODIUM 2000 MG: 2 SOLUTION INTRAVENOUS at 21:59

## 2019-09-25 RX ADMIN — CEFAZOLIN SODIUM 2000 MG: 2 SOLUTION INTRAVENOUS at 14:25

## 2019-09-25 RX ADMIN — VITAMIN D, TAB 1000IU (100/BT) 1000 UNITS: 25 TAB at 09:40

## 2019-09-25 RX ADMIN — LORAZEPAM 1 MG: 1 TABLET ORAL at 21:59

## 2019-09-25 RX ADMIN — ARIPIPRAZOLE 10 MG: 10 TABLET ORAL at 09:41

## 2019-09-25 RX ADMIN — ENOXAPARIN SODIUM 90 MG: 100 INJECTION SUBCUTANEOUS at 18:21

## 2019-09-25 RX ADMIN — POTASSIUM CHLORIDE 40 MEQ: 1500 TABLET, EXTENDED RELEASE ORAL at 09:40

## 2019-09-25 RX ADMIN — VENLAFAXINE 75 MG: 37.5 TABLET ORAL at 18:21

## 2019-09-25 RX ADMIN — OXYBUTYNIN CHLORIDE 10 MG: 5 TABLET, EXTENDED RELEASE ORAL at 09:39

## 2019-09-25 RX ADMIN — LIDOCAINE HYDROCHLORIDE 80 MG: 10 INJECTION, SOLUTION INFILTRATION; PERINEURAL at 12:32

## 2019-09-25 RX ADMIN — POTASSIUM CHLORIDE 40 MEQ: 1500 TABLET, EXTENDED RELEASE ORAL at 18:21

## 2019-09-25 NOTE — PROGRESS NOTES
Progress Note - Infectious Disease   Sola Melendez 60 y o  female MRN: [id number]  Unit/Bed#: [facility] 912-01 Encounter: [id number]      Impression/Plan:    42-year-old female patient with endometrial cancer currently treated with chemotherapy through a right chest wall Port-A-Cath, presenting for fever and severe sepsis found to have MSSA BSI, port infection and right IJ septic phlebitis and possible pulmonary septic emboli     1- severe sepsis, POA:  With fever, hypotension that responded to IVF, [place] on admission, now resolved  Sepsis is secondary to #2  - supportive care and IVF as per primary team  - continue antibiotics as below        2- Complicated MSSA bacteremia and Port-A-Cath infection:  Blood culture collected on admission on 09/19/2019 is positive for Staph aureus in 1/1 set; Bcx 09/20 and 09/21/19 remain positive; Bcx 09/23/19 negative to date  TTE negative  TRINH unsuccessful  CT chest showing right IJ septic thrombophlebitis     - continue cefazolin; follow up result of Bcx 09/23/19  - do not insert a new port or picc until the repeat blood culture is negative and patient is persistently afebrile   - repeat CBC, close clinical follow-up and serial exams  - as risk of TRINH is high because of possible esophageal pathology, and TTE is negative for vegetations,  can hold off on repeating TRINH  Patient will need 6 weeks of iv abx therapy due to septic thrombophlebitis        3- right IJ septic thrombophlebitis:  CT chest showing filling defect over right IJ  Also patient had erythema over the right IJ vein on admission  Patient on [facility]  - continue abx as above   Consider vascular evaluation  - continue close clinical monitoring     4- Endometrial cancer:  Last chemotherapy received 09/09/2019  - oncology follow-up        5- acute kidney injury POA:  Now resolved with Creat today 0 7 mg/dl  - repeat BMP  - avoid nephrotoxic meds     6- acute respiratory failure POA:   Chest x-ray showing vascular congestion  CT chest suggestive of possible septic emboli  Patient reports significant  improvement in her shortness of breath  - monitor clinically  - continue abx as above     7- possible esophageal stricture:  Patient planned for EGD today by GI    Case discussed with primary team     Antibiotics:  Antibiotics day 7  cefazolin day 4    Subjective:  Patient has no fever, chills, sweats; no nausea, vomiting, diarrhea; no cough, shortness of breath; no pain  No new symptoms  Objective:  Vitals:  Temp:  [97 4 °F (36 3 °C)-98 9 °F (37 2 °C)] 98 2 °F (36 8 °C)  HR:  [] 88  Resp:  [18-22] 18  BP: (100-147)/(57-87) 127/79  SpO2:  [94 %-99 %] 96 %  Temp (24hrs), Av 3 °F (36 8 °C), Min:97 4 °F (36 3 °C), Max:98 9 °F (37 2 °C)  Current: Temperature: 98 2 °F (36 8 °C)    Physical Exam:   General Appearance:  Alert, interactive, nontoxic, no acute distress  Throat: Oropharynx moist without lesions  Lungs:   Clear to auscultation bilaterally; no wheezes, rhonchi or rales; respirations unlabored   Heart:  RRR; no murmur, rub or gallop   Abdomen:   Soft, non-tender, non-distended, positive bowel sounds  Right PCN in place   Extremities: No clubbing, cyanosis or edema   Skin: No new rashes or lesions  No draining wounds noted  Previous port site in RCW is not tender, not swollen  Erythema of the lower neck over the right IJ  has almost resolved now          Labs, Imaging, & Other studies:   All pertinent labs and imaging studies were personally reviewed  Results from last 7 days   Lab Units 19  0514 192 19  0532   WBC Thousand/uL 17 15* 19 10* 18 65*   HEMOGLOBIN g/dL 7 3* 7 5* 8 1*   PLATELETS Thousands/uL 250 223 177     Results from last 7 days   Lab Units 19  0514 19  0442 19  0532  19  1711   SODIUM mmol/L 135* 132* 138   < > 129*   POTASSIUM mmol/L 3 8 3 0* 3 0*   < > 2 9*   CHLORIDE mmol/L 102 100 105   < > 97*   CO2 mmol/L 25 24 22   < > 18*   BUN mg/dL 14 11 12   < > 37*   CREATININE mg/dL 0 71 0 54* 0 62   < > 1 91*   EGFR ml/min/1 73sq m 92 101 97   < > 28   CALCIUM mg/dL 8 8 8 6 9 2   < > 8 6   AST U/L  --   --   --   --  55*   ALT U/L  --   --   --   --  41   ALK PHOS U/L  --   --   --   --  91    < > = values in this interval not displayed  Results from last 7 days   Lab Units 09/23/19  1353 09/23/19  1338 09/21/19  0536 09/21/19  0535 09/20/19  1058 09/20/19  1057 09/19/19  1731 09/19/19  1711   BLOOD CULTURE  No Growth at 24 hrs  No Growth at 24 hrs  No Growth at 72 hrs   Staphylococcus aureus* Staphylococcus aureus* Staphylococcus aureus*  --  Staphylococcus aureus*   GRAM STAIN RESULT   --   --   --  Gram positive cocci in clusters* Gram positive cocci in clusters* Gram positive cocci in clusters*  --  Gram positive cocci in clusters*   URINE CULTURE   --   --   --   --   --   --  60,000-69,000 cfu/ml Coagulase negative Staphylococcus species*  60,000-69,000 cfu/ml Corynebacterium jeikeium*  --      Results from last 7 days   Lab Units 09/22/19  0530 09/20/19  0515 09/19/19  1711   PROCALCITONIN ng/ml 0 92* 4 97* 4 39*

## 2019-09-25 NOTE — PROGRESS NOTES
Progress Note - Yris Miramontes Pulkareni 1955, 60 y o  female MRN: [id number]    Unit/Bed#: [facility] 912-01 Encounter: [id number]    Primary Care Provider: Hawa Doty DO   Date and time admitted to hospital: 9/19/2019  4:09 PM        Cough  Assessment & Plan  · Kept NPO, speech evaled - rec pureed but pt denying hence place on full liquid  · Speech and swallow evaluation for dysphagia, stricture  · Cough could be from mild ongoing aspiration vs suspected septic emboli  · On RA    Acute respiratory failure (HCC)  Assessment & Plan  · Evidence upon admission with tachypnea, dyspnea, hypoxia after IV fluid resuscitation for sepsis  · s/p 20 mg IV Lasix on admission  · Improved now  · Weaning off O2 - now on RA  · Hold off on further diuresis - use Prn  · CT chest has R>L effusion    MSSA bacteremia  Assessment & Plan  · MSSA bacteremia  · Repeat BC 9/20 positive, BC from 9/21 positive, BC from 9/23 neg for 48 hrs, will be able to put picc line when 72 hrs neg  · Port removed - pocket looked infected, this is complicated line infection  · IV Vanco changed to IV ancef  · She will need 6 weeks total of antibiotics  · Check TTE negative, TRINH could not be done due to poor anatomy, she has EGD negative for findings  Hypokalemia  Assessment & Plan  · K 2 9  · After repletion, now 3 2, replete today    Volume overload  Assessment & Plan  · Suspected by the admitting physician at the time of evaluation due to dyspnea, tachypnea, hypoxia  · Received 3 L of fluid for sepsis followed by 20 mg of IV Lasix  · No longer appears volume overload, mild tachypnea - hold IVF  · Hold off on further diuresis - use prn    Acute kidney injury ([address] )  Assessment & Plan  · POA, baseline approximately 0 6  · Suspect in setting of sepsis, poor p o   Intake  · Received 3 L NS in ED however unfortunately with evidence of volume overload also got some lasix  · S/p IVF  · Resolved to 0 6  · monitor post IV contrast  · Hold quinapril, renally dose medications otherwise    * Sepsis ([facility] [address] )  Assessment & Plan  · POA with fevers, leukocytosis, tachycardia  Elevated procalcitonin on admission however with normal lactic acid level  · 2/2 MSSA bacteremia  · Vanco changed to IV Ancef  · Port a cath removed by IR - pocket was infected  · TTE performed - negative  · Repeat BC  are positive, BC  again coming back positive  · TRINH could not be done due to anatomy  EGD did not show any esophageal abnormalities           VTE Pharmacologic Prophylaxis:   Pharmacologic: Enoxaparin (Lovenox)  Mechanical VTE Prophylaxis in Place: Yes    Patient Centered Rounds: I have performed bedside rounds with nursing staff today  Discussions with Specialists or Other Care Team Provider: ID, GYN    Education and Discussions with Family / Patient: patient     Time Spent for Care: 45 minutes  More than 50% of total time spent on counseling and coordination of care as described above  Current Length of Stay: 6 day(s)    Current Patient Status: Inpatient   Certification Statement: The patient will continue to require additional inpatient hospital stay due to MSSA infection need antibiotics    Discharge Plan: possible next 48 to 72 hrs     Code Status: Level 1 - Full Code      Subjective:   Patient is doing well  She has no complaints today  Objective:     Vitals:   Temp (24hrs), Av 6 °F (37 °C), Min:97 4 °F (36 3 °C), Max:99 9 °F (37 7 °C)    Temp:  [97 4 °F (36 3 °C)-99 9 °F (37 7 °C)] 99 9 °F (37 7 °C)  HR:  [] 96  Resp:  [18-22] 18  BP: (100-147)/(57-87) 137/76  SpO2:  [95 %-99 %] 96 %  Body mass index is 39 63 kg/m²  Input and Output Summary (last 24 hours): Intake/Output Summary (Last 24 hours) at 2019 1738  Last data filed at 2019 1637  Gross per 24 hour   Intake 220 ml   Output 600 ml   Net -380 ml       Physical Exam:     Physical Exam   Constitutional: She is oriented to person, place, and time   She appears well-developed and well-nourished  HENT:   Head: Normocephalic and atraumatic  Right Ear: External ear normal    Left Ear: External ear normal    Nose: Nose normal    Mouth/Throat: Oropharynx is clear and moist    Eyes: Pupils are equal, round, and reactive to light  Conjunctivae and EOM are normal  Left eye exhibits no discharge  Neck: Normal range of motion  Neck supple  No JVD present  No tracheal deviation present  No thyromegaly present  Cardiovascular: Normal rate, regular rhythm, normal heart sounds and intact distal pulses  Exam reveals no gallop and no friction rub  No murmur heard  Pulmonary/Chest: Effort normal and breath sounds normal  No stridor  No respiratory distress  She has no wheezes  Abdominal: Soft  Bowel sounds are normal  She exhibits no distension and no mass  There is no tenderness  There is no rebound and no guarding  Lymphadenopathy:     She has no cervical adenopathy  Neurological: She is alert and oriented to person, place, and time  She displays normal reflexes  No cranial nerve deficit  She exhibits normal muscle tone  Coordination normal    Skin: Skin is warm and dry  No rash noted  No erythema  No pallor  Nursing note and vitals reviewed  Additional Data:     Labs:    Results from last 7 days   Lab Units 09/25/19  0514   WBC Thousand/uL 17 15*   HEMOGLOBIN g/dL 7 3*   HEMATOCRIT % 23 1*   PLATELETS Thousands/uL 250   NEUTROS PCT % 70   LYMPHS PCT % 9*   MONOS PCT % 10   EOS PCT % 0     Results from last 7 days   Lab Units 09/25/19  0514  09/19/19  1711   POTASSIUM mmol/L 3 8   < > 2 9*   CHLORIDE mmol/L 102   < > 97*   CO2 mmol/L 25   < > 18*   BUN mg/dL 14   < > 37*   CREATININE mg/dL 0 71   < > 1 91*   CALCIUM mg/dL 8 8   < > 8 6   ALK PHOS U/L  --   --  91   ALT U/L  --   --  41   AST U/L  --   --  55*    < > = values in this interval not displayed  Results from last 7 days   Lab Units 09/20/19  1742   INR  2 10*       * I Have Reviewed All Lab Data Listed Above    * Additional Pertinent Lab Tests Reviewed: [address] Admission Reviewed    Imaging:    Imaging Reports Reviewed Today Include:    Imaging Personally Reviewed by Myself Includes:       Recent Cultures (last 7 days):     Results from last 7 days   Lab Units 09/24/19  1447 09/24/19  1433 09/23/19  1353 09/23/19  1338 09/21/19  0536 09/21/19  0535 09/20/19  1058 09/20/19  1057 09/19/19  1731 09/19/19  1711   BLOOD CULTURE  No Growth at 24 hrs  No Growth at 24 hrs  No Growth at 48 hrs  No Growth at 48 hrs   Staphylococcus aureus* Staphylococcus aureus* Staphylococcus aureus* Staphylococcus aureus*  --  Staphylococcus aureus*   GRAM STAIN RESULT   --   --   --   --   --  Gram positive cocci in clusters* Gram positive cocci in clusters* Gram positive cocci in clusters*  --  Gram positive cocci in clusters*   URINE CULTURE   --   --   --   --   --   --   --   --  60,000-69,000 cfu/ml Coagulase negative Staphylococcus species*  60,000-69,000 cfu/ml Corynebacterium jeikeium*  --        Last 24 Hours Medication List:     Current Facility-Administered Medications:  acetaminophen 650 mg Oral Q6H PRN Emperatriz Rosas PA-C    ARIPiprazole 10 mg Oral Daily Adelita Traore, DO    aspirin 81 mg Oral Daily Adelita Traore, DO    benzonatate 200 mg Oral TID PRN Emperatriz Rosas PA-C    cefazolin 2,000 mg Intravenous Q8H Josiane Montez MD Last Rate: 2,000 mg (09/25/19 1425)   cholecalciferol 1,000 Units Oral Daily Adelita Traore, DO    enoxaparin 1 mg/kg Subcutaneous Q12H [address] Charlie Maradiaga MD    LORazepam 1 mg Oral Q6H PRN Rosaura Tripp MD    mupirocin  Topical TID Adelita Traore, DO    oxybutynin 10 mg Oral Daily Adelita Traore, DO    pantoprazole 40 mg Intravenous Q24H [address] Zo Burgos MD    polyethylene glycol 17 g Oral Daily Bryant G Chiraya, DO    potassium chloride 40 mEq Oral BID Rosaura Tripp MD    sodium chloride (PF) 10 mL Intracatheter Daily Adelita Traore DO    venlafaxine 75 mg Oral TID Jos Méndez DO Nadir         Today, Patient Was Seen By: Rosaura Tripp MD    ** Please Note: Dictation voice to text software may have been used in the creation of this document   **

## 2019-09-25 NOTE — RESTORATIVE TECHNICIAN NOTE
Restorative Specialist Mobility Note       Activity: Ambulate in mehta, Ambulate in room, Bathroom privileges, Chair, Dangle, Stand at bedside(Educated/encouraged pt to ambulate with assistance 3-4 x's/day  Chair alarm on   Pt callbell, phone/tray within reach )     Assistive Device: Front wheel walker    Christian SANTILLAN, Restorative Technician, [employer]

## 2019-09-25 NOTE — ANESTHESIA POSTPROCEDURE EVALUATION
Post-Op Assessment Note    CV Status:  Stable  Pain Score: 0    Pain management: adequate     Mental Status:  Alert and awake   Hydration Status:  Euvolemic   PONV Controlled:  Controlled   Airway Patency:  Patent   Post Op Vitals Reviewed: Yes      Staff: CRNA, Anesthesiologist           /65 (09/25/19 1245)    Temp      Pulse 86 (09/25/19 1245)   Resp 20 (09/25/19 1245)    SpO2 99 % (09/25/19 1245)

## 2019-09-25 NOTE — ASSESSMENT & PLAN NOTE
· MSSA bacteremia  · Repeat BC 9/20 positive, BC from 9/21 positive, BC from 9/23 neg for 48 hrs, will be able to put picc line when 72 hrs neg  · Port removed - pocket looked infected, this is complicated line infection  · IV Vanco changed to IV ancef  · She will need 6 weeks total of antibiotics  · Check TTE negative, TRINH could not be done due to poor anatomy, she has EGD negative for findings

## 2019-09-25 NOTE — PLAN OF CARE
Problem: Potential for Falls  Goal: Patient will remain free of falls  Description  INTERVENTIONS:  - Assess patient frequently for physical needs  -  Identify cognitive and physical deficits and behaviors that affect risk of falls  -  [place] fall precautions as indicated by assessment   - Educate patient/family on patient safety including physical limitations  - Instruct patient to call for assistance with activity based on assessment  - Modify environment to reduce risk of injury  - Consider OT/PT consult to assist with strengthening/mobility  Outcome: Progressing     Problem: Nutrition/Hydration-ADULT  Goal: Nutrient/Hydration intake appropriate for improving, restoring or maintaining nutritional needs  Description  Monitor and assess patient's nutrition/hydration status for malnutrition  Collaborate with interdisciplinary team and initiate plan and interventions as ordered  Monitor patient's weight and dietary intake as ordered or per policy  Utilize nutrition screening tool and intervene as necessary  Determine patient's food preferences and provide high-protein, high-caloric foods as appropriate       INTERVENTIONS:  - Monitor oral intake, urinary output, labs, and treatment plans  - Assess nutrition and hydration status and recommend course of action  - Evaluate amount of meals eaten  - Assist patient with eating if necessary   - Allow adequate time for meals  - Recommend/ encourage appropriate diets, oral nutritional supplements, and vitamin/mineral supplements  - Order, calculate, and assess calorie counts as needed  - Recommend, monitor, and adjust tube feedings and TPN/PPN based on assessed needs  - Assess need for intravenous fluids  - Provide specific nutrition/hydration education as appropriate  - Include patient/family/caregiver in decisions related to nutrition  Outcome: Progressing     Problem: INFECTION - ADULT  Goal: Absence or prevention of progression during hospitalization  Description  INTERVENTIONS:  - Assess and monitor for signs and symptoms of infection  - Monitor lab/diagnostic results  - Monitor all insertion sites, i e  indwelling lines, tubes, and drains  - Monitor endotracheal if appropriate and nasal secretions for changes in amount and color  - [place] appropriate cooling/warming therapies per order  - Administer medications as ordered  - Instruct and encourage patient and family to use good hand hygiene technique  - Identify and instruct in appropriate isolation precautions for identified infection/condition  Outcome: Progressing  Goal: Absence of fever/infection during neutropenic period  Description  INTERVENTIONS:  - Monitor WBC    Outcome: Progressing     Problem: SAFETY ADULT  Goal: Maintain or return to baseline ADL function  Description  INTERVENTIONS:  -  Assess patient's ability to carry out ADLs; assess patient's baseline for ADL function and identify physical deficits which impact ability to perform ADLs (bathing, care of mouth/teeth, toileting, grooming, dressing, etc )  - Assess/evaluate cause of self-care deficits   - Assess range of motion  - Assess patient's mobility; develop plan if impaired  - Assess patient's need for assistive devices and provide as appropriate  - Encourage maximum independence but intervene and supervise when necessary  - Involve family in performance of ADLs  - Assess for home care needs following discharge   - Consider OT consult to assist with ADL evaluation and planning for discharge  - Provide patient education as appropriate  Outcome: Progressing  Goal: Maintain or return mobility status to optimal level  Description  INTERVENTIONS:  - Assess patient's baseline mobility status (ambulation, transfers, stairs, etc )    - Identify cognitive and physical deficits and behaviors that affect mobility  - Identify mobility aids required to assist with transfers and/or ambulation (gait belt, sit-to-stand, lift, walker, cane, etc )  - [place] fall precautions as indicated by assessment  - Record patient progress and toleration of activity level on Mobility SBAR; progress patient to next Phase/Stage  - Instruct patient to call for assistance with activity based on assessment  - Consider rehabilitation consult to assist with strengthening/weightbearing, etc   Outcome: Progressing     Problem: DISCHARGE PLANNING  Goal: Discharge to home or other facility with appropriate resources  Description  INTERVENTIONS:  - Identify barriers to discharge w/patient and caregiver  - Arrange for needed discharge resources and transportation as appropriate  - Identify discharge learning needs (meds, wound care, etc )  - Arrange for interpretive services to assist at discharge as needed  - Refer to Case Management Department for coordinating discharge planning if the patient needs post-hospital services based on physician/advanced practitioner order or complex needs related to functional status, cognitive ability, or social support system  Outcome: Progressing     Problem: Prexisting or High Potential for Compromised Skin Integrity  Goal: Skin integrity is maintained or improved  Description  INTERVENTIONS:  - Identify patients at risk for skin breakdown  - Assess and monitor skin integrity  - Assess and monitor nutrition and hydration status  - Monitor labs   - Assess for incontinence   - Turn and reposition patient  - Assist with mobility/ambulation  - Relieve pressure over bony prominences  - Avoid friction and shearing  - Provide appropriate hygiene as needed including keeping skin clean and dry  - Evaluate need for skin moisturizer/barrier cream  - Collaborate with interdisciplinary team   - Patient/family teaching  - Consider wound care consult   Outcome: Progressing

## 2019-09-25 NOTE — SPEECH THERAPY NOTE
Speech Language/Pathology    Speech/Language Pathology Progress Note    Patient Name: Trey Andres  Today's Date: 9/25/2019       Subjective:  "I ate the most I have in days"  Current Diet: level 2 w/ thin  Objective:  Pt seen following EGD  Pt states she was able to eat most of her lunch that was ordered  Pt  Took soft solid- adequate bite, slow manipulation & transfer w/ sips of thin  Pt needed mult swallows & alternated sips thin to clear material    Swallows are prompt, appropriate  Assessment:  Pt is tolerating soft solids  Pt states she prefers this diet but will attempt an upgrade  Plan/Recommendations:  Continue level2 w/ thin  Speech will trial w/ level 3 as able

## 2019-09-25 NOTE — PROGRESS NOTES
Gyn Oncology Progress note   Vernadine Mcburney Pulcini 60 y o  female MRN: [id number]  Unit/Bed#: [facility] 912-01 Encounter: [id number]    Assessment: 60 y o  recurrent stage IB endometrial cancer with biopsy-proven right pelvic sidewall recurrence, second-line chemotherapy, positive blood culture on 09/20, 9/21, status post port A catheter removal     Plan:    1) Septic shock-medicine sensitive Staph aureus septicemia  - MSSA bacteremia setting of a port A catheter  -catheter removed, continue daily blood cultures, blood cultures was negative on day 9/23  - Patient is on cefazolin  - AM  CBC is pending  - Chest CT showing right internal jugular septic thrombophlebitis  - Rule out infective endocarditis;  transthoracic echocardiogram was negative, transesophageal echocardiogram unsuccessful yesterday  - Gastroenterology will perform EGD possible esophageal pathology,     2) Acute kidney injury  - status post septic shock  - patient has a creatinine level 0 6, creatinine levels fluctuate in setting of septic shock 1 91->1 6->0 9->0 6->0 54 yesterday    3) Respiratory failure  - Patient is in room air, is able to at 96%  - Patient started ambulate, she is able to reach the bathroom and yesterday she was walking a short trip in corner  She has shortness of breath after ambulation   - Bilateral lungs clear to auscultate  - She is able to tolerate liquids, but coughing with solid food  C cuffing may related with aspiration  She will have EGD today by Gastroenterology, may help to figure out the primary reason for coughing with food    4  Anemia  - Hemoglobin 7 5->7 0-> 1 packed red blood cell transfusion 8 1->7 5    5  Anticoagulation  -Lovenox 90 mg b i d , she was on Xarelto previously    6) Benign essential hypertension  - [facility] approve holding, blood pressure is 110s 120s/ 70 cc    7) Physical deconditioning  - PT is seeing patient, she was able to sit next to bed herself and able to walk bathroom       Subjective:     Vernadine Mcburney Joya Auguste has no current complaints  Pain is well controlled with Tylenol  Patient is currently voiding  She is ambulating  Patient is currently passing flatus and has had no bowel movement  She is tolerating PO, and denies nausea or vomitting  Patient denies fever, chills, chest pain, shortness of breath, or calf tenderness  /78   Pulse 90   Temp (!) 97 4 °F (36 3 °C)   Resp 20   Wt 89 1 kg (196 lb 6 9 oz)   LMP  (LMP Unknown)   SpO2 97%   BMI 39 67 kg/m²     I/O last 3 completed shifts: In: 1320 [P O :840; I V :330; IV Piggyback:150]  Out: 1900 [Urine:1900]  I/O this shift: In: 0   Out: 250 [Urine:250]    Lab Results   Component Value Date    WBC 19 10 (H) 09/24/2019    HGB 7 5 (L) 09/24/2019    HCT 22 7 (L) 09/24/2019    MCV 86 09/24/2019     09/24/2019       Lab Results   Component Value Date    GLUCOSE 219 (H) 12/19/2017    CALCIUM 8 6 09/24/2019     10/27/2017    K 3 0 (L) 09/24/2019    CO2 24 09/24/2019     09/24/2019    BUN 11 09/24/2019    CREATININE 0 54 (L) 09/24/2019           Physical Exam   Constitutional: She appears well-developed and well-nourished  No distress  Cardiovascular: Normal rate, regular rhythm and normal heart sounds  Exam reveals no friction rub  No murmur heard  Pulmonary/Chest: Breath sounds normal  She is in respiratory distress  Respiratory this distress status post walk to but room, she is on room air, S a O2 96%   Abdominal: Soft  Musculoskeletal: Normal range of motion  Neurological: She is alert  Skin: Skin is dry  She is not diaphoretic  Psychiatric: She has a normal mood and affect   Her behavior is normal          Jose M Sánchez MD  9/81/1249  6:29 AM

## 2019-09-25 NOTE — ANESTHESIA PREPROCEDURE EVALUATION
Review of Systems/Medical History  Patient summary reviewed  Chart reviewed  No history of anesthetic complications     Cardiovascular  Exercise tolerance (METS): <4,  Hyperlipidemia, Hypertension ,   Comment: Cardiology attempted to pass TRINH, need to r/o endocarditis, however, could not advance probe  ,  Pulmonary  Negative pulmonary ROS        GI/Hepatic  Negative GI/hepatic ROS   GERD ,   Comment: Difficulty passing TRINH probe    Gastric Bypass     Negative  ROS        Endo/Other  Negative endo/other ROS Diabetes type 2 Oral agent,   Comment: Sepsis with MSSA bacteremia Obesity  morbid obesity   GYN  Negative gynecology ROS   Hysterectomy, Uterine cancer (endometrial Ca, last chemo 9/2019),        Hematology  Anemia (bone marrow suppression 2/2 chemo) ,  Coagulation disorder currently taking oral anticoagulants,    Musculoskeletal  Negative musculoskeletal ROS        Neurology  Negative neurology ROS      Psychology   Depression ,          Prev Anes - Diagnostic Lap 12/19/17: Grade 1 view with MAC 3 blade, MV not attempted (RSI)    TTE 9/21/19: LVEF 65%, no rWMA, increased wall thickness, grade 1 DD, RV fn nl, no valvular pathology although study unable to exclude possibility of vegetation    EKG 12/19/2017: NSR, LAD    CT c/a/p 9/22/2019:  1  No definite evidence of metastatic disease in the chest   New scattered bilateral pulmonary nodules measuring up to 4 mm and new nodular opacities in the left lower lobe, nonspecific and likely infectious/inflammatory  However, continued   surveillance as per the patient's oncologic imaging protocol is recommended to document stability/resolution  2   New small right and trace left pleural effusions with associated atelectasis  3   Decreased size of the right pelvic mass with persistent encasement/abutment of the right iliac vessels and probable occlusion of the right external iliac vein  Decreased right iliac chain lymphadenopathy    4   Interval resolution of right hydronephrosis status post placement of a percutaneous nephrostomy tube and ureteral stent  CXR 9/19/19: Right chest wall power injectable port with Bailey needle in place  Catheter tip at the cavoatrial junction  Appearance is unchanged  Heart size within normal limits for portable technique  Mild aortic tortuosity  Pulmonary vascular indistinctness with mild cephalization  No pneumothorax, effusion, or focal consolidation  Few scattered Kerley B lines  Bones are unremarkable  ID progress note 9/23/19:  2- MSSA bacteremia and Port-A-Cath infection:  Blood culture collected on admission on 09/19/2019 is positive for Staph aureus in 1/1 set; Bcx 09/20 and 09/21/19 remain positive  TTE negative  CT chest showing right IJ septic thrombophlebitis     - repeat blood culture daily until bacteremia clears  - continue cefazolin   - do not insert a new port until the repeat blood culture is negative and patient is persistently afebrile   - repeat CBC, close clinical follow-up and serial exams  - TRINH to rule out     Recent Results (from the past 48 hour(s))   Blood culture    Collection Time: 09/23/19  1:38 PM   Result Value Ref Range    Blood Culture No Growth at 24 hrs  Blood culture    Collection Time: 09/23/19  1:53 PM   Result Value Ref Range    Blood Culture No Growth at 24 hrs      CBC and differential    Collection Time: 09/24/19  4:42 AM   Result Value Ref Range    WBC 19 10 (H) 4 31 - 10 16 Thousand/uL    RBC 2 63 (L) 3 81 - 5 12 Million/uL    Hemoglobin 7 5 (L) 11 5 - 15 4 g/dL    Hematocrit 22 7 (L) 34 8 - 46 1 %    MCV 86 82 - 98 fL    MCH 28 5 26 8 - 34 3 pg    MCHC 33 0 31 4 - 37 4 g/dL    RDW 16 6 (H) 11 6 - 15 1 %    MPV 10 6 8 9 - 12 7 fL    Platelets 433 868 - 363 Thousands/uL    nRBC 0 /100 WBCs    Neutrophils Relative 73 43 - 75 %    Immat GRANS % 8 (H) 0 - 2 %    Lymphocytes Relative 9 (L) 14 - 44 %    Monocytes Relative 9 4 - 12 %    Eosinophils Relative 0 0 - 6 %    Basophils Relative 1 0 - 1 %    Neutrophils Absolute 14 03 (H) 1 85 - 7 62 Thousands/µL    Immature Grans Absolute >0 50 (H) 0 00 - 0 20 Thousand/uL    Lymphocytes Absolute 1 78 0 60 - 4 47 Thousands/µL    Monocytes Absolute 1 68 (H) 0 17 - 1 22 Thousand/µL    Eosinophils Absolute 0 01 0 00 - 0 61 Thousand/µL    Basophils Absolute 0 14 (H) 0 00 - 0 10 Thousands/µL   Basic metabolic panel    Collection Time: 09/24/19  4:42 AM   Result Value Ref Range    Sodium 132 (L) 136 - 145 mmol/L    Potassium 3 0 (L) 3 5 - 5 3 mmol/L    Chloride 100 100 - 108 mmol/L    CO2 24 21 - 32 mmol/L    ANION GAP 8 4 - 13 mmol/L    BUN 11 5 - 25 mg/dL    Creatinine 0 54 (L) 0 60 - 1 30 mg/dL    Glucose 94 65 - 140 mg/dL    Calcium 8 6 8 3 - 10 1 mg/dL    eGFR 101 ml/min/1 73sq m   CBC and differential    Collection Time: 09/25/19  5:14 AM   Result Value Ref Range    WBC 17 15 (H) 4 31 - 10 16 Thousand/uL    RBC 2 60 (L) 3 81 - 5 12 Million/uL    Hemoglobin 7 3 (L) 11 5 - 15 4 g/dL    Hematocrit 23 1 (L) 34 8 - 46 1 %    MCV 89 82 - 98 fL    MCH 28 1 26 8 - 34 3 pg    MCHC 31 6 31 4 - 37 4 g/dL    RDW 17 2 (H) 11 6 - 15 1 %    MPV 11 4 8 9 - 12 7 fL    Platelets 255 739 - 775 Thousands/uL    nRBC 0 /100 WBCs    Neutrophils Relative 70 43 - 75 %    Immat GRANS % 10 (H) 0 - 2 %    Lymphocytes Relative 9 (L) 14 - 44 %    Monocytes Relative 10 4 - 12 %    Eosinophils Relative 0 0 - 6 %    Basophils Relative 1 0 - 1 %    Neutrophils Absolute 12 07 (H) 1 85 - 7 62 Thousands/µL    Immature Grans Absolute >0 50 (H) 0 00 - 0 20 Thousand/uL    Lymphocytes Absolute 1 60 0 60 - 4 47 Thousands/µL    Monocytes Absolute 1 71 (H) 0 17 - 1 22 Thousand/µL    Eosinophils Absolute 0 00 0 00 - 0 61 Thousand/µL    Basophils Absolute 0 14 (H) 0 00 - 0 10 Thousands/µL   Basic metabolic panel    Collection Time: 09/25/19  5:14 AM   Result Value Ref Range    Sodium 135 (L) 136 - 145 mmol/L    Potassium 3 8 3 5 - 5 3 mmol/L    Chloride 102 100 - 108 mmol/L    CO2 25 21 - 32 mmol/L ANION GAP 8 4 - 13 mmol/L    BUN 14 5 - 25 mg/dL    Creatinine 0 71 0 60 - 1 30 mg/dL    Glucose 89 65 - 140 mg/dL    Calcium 8 8 8 3 - 10 1 mg/dL    eGFR 92 ml/min/1 73sq m       Physical Exam    Airway    Mallampati score: III  TM Distance: >3 FB  Neck ROM: limited     Dental   No notable dental hx     Cardiovascular  Rhythm: regular, Rate: abnormal, No murmur,     Pulmonary  Breath sounds clear to auscultation,     Other Findings        Anesthesia Plan  ASA Score- 4     Anesthesia Type- IV sedation with anesthesia with ASA Monitors  Additional Monitors:   Airway Plan:     Comment: Time-sensitive procedure in order to rule out endocarditis, risks explained and patient understand  Plan Factors-    Induction-     Postoperative Plan-     Informed Consent- Anesthetic plan and risks discussed with patient  I personally reviewed this patient with the CRNA  Discussed and agreed on the Anesthesia Plan with the CRNA  Kathie Ashraf

## 2019-09-25 NOTE — ASSESSMENT & PLAN NOTE
· POA with fevers, leukocytosis, tachycardia  Elevated procalcitonin on admission however with normal lactic acid level  · 2/2 MSSA bacteremia  · Vanco changed to IV Ancef  · Port a cath removed by IR - pocket was infected  · TTE performed - negative  · Repeat BC 9/20 are positive, BC 9/21 again coming back positive  · TRINH could not be done due to anatomy   EGD did not show any esophageal abnormalities

## 2019-09-26 DIAGNOSIS — B95.61 MSSA BACTEREMIA: Primary | ICD-10-CM

## 2019-09-26 DIAGNOSIS — R78.81 MSSA BACTEREMIA: Primary | ICD-10-CM

## 2019-09-26 LAB
ABO GROUP BLD: NORMAL
ALBUMIN SERPL BCP-MCNC: 1.9 G/DL (ref 3.5–5)
ALP SERPL-CCNC: 95 U/L (ref 46–116)
ALT SERPL W P-5'-P-CCNC: 25 U/L (ref 12–78)
ANION GAP SERPL CALCULATED.3IONS-SCNC: 7 MMOL/L (ref 4–13)
ANION GAP SERPL CALCULATED.3IONS-SCNC: 8 MMOL/L (ref 4–13)
ANISOCYTOSIS BLD QL SMEAR: PRESENT
AST SERPL W P-5'-P-CCNC: 36 U/L (ref 5–45)
BASOPHILS # BLD AUTO: 0.15 THOUSANDS/ΜL (ref 0–0.1)
BASOPHILS # BLD MANUAL: 0 THOUSAND/UL (ref 0–0.1)
BASOPHILS NFR BLD AUTO: 1 % (ref 0–1)
BASOPHILS NFR MAR MANUAL: 0 % (ref 0–1)
BILIRUB SERPL-MCNC: 0.32 MG/DL (ref 0.2–1)
BLD GP AB SCN SERPL QL: NEGATIVE
BUN SERPL-MCNC: 21 MG/DL (ref 5–25)
BUN SERPL-MCNC: 21 MG/DL (ref 5–25)
CALCIUM SERPL-MCNC: 8.2 MG/DL (ref 8.3–10.1)
CALCIUM SERPL-MCNC: 8.6 MG/DL (ref 8.3–10.1)
CHLORIDE SERPL-SCNC: 101 MMOL/L (ref 100–108)
CHLORIDE SERPL-SCNC: 101 MMOL/L (ref 100–108)
CO2 SERPL-SCNC: 21 MMOL/L (ref 21–32)
CO2 SERPL-SCNC: 23 MMOL/L (ref 21–32)
CREAT SERPL-MCNC: 0.85 MG/DL (ref 0.6–1.3)
CREAT SERPL-MCNC: 0.9 MG/DL (ref 0.6–1.3)
EOSINOPHIL # BLD AUTO: 0.01 THOUSAND/ΜL (ref 0–0.61)
EOSINOPHIL # BLD MANUAL: 0 THOUSAND/UL (ref 0–0.4)
EOSINOPHIL NFR BLD AUTO: 0 % (ref 0–6)
EOSINOPHIL NFR BLD MANUAL: 0 % (ref 0–6)
ERYTHROCYTE [DISTWIDTH] IN BLOOD BY AUTOMATED COUNT: 16.9 % (ref 11.6–15.1)
ERYTHROCYTE [DISTWIDTH] IN BLOOD BY AUTOMATED COUNT: 16.9 % (ref 11.6–15.1)
GFR SERPL CREATININE-BSD FRML MDRD: 69 ML/MIN/1.73SQ M
GFR SERPL CREATININE-BSD FRML MDRD: 74 ML/MIN/1.73SQ M
GLUCOSE SERPL-MCNC: 80 MG/DL (ref 65–140)
GLUCOSE SERPL-MCNC: 88 MG/DL (ref 65–140)
HCT VFR BLD AUTO: 20.8 % (ref 34.8–46.1)
HCT VFR BLD AUTO: 22.5 % (ref 34.8–46.1)
HCT VFR BLD AUTO: 23.6 % (ref 34.8–46.1)
HGB BLD-MCNC: 6.8 G/DL (ref 11.5–15.4)
HGB BLD-MCNC: 7.3 G/DL (ref 11.5–15.4)
HGB BLD-MCNC: 7.5 G/DL (ref 11.5–15.4)
IMM GRANULOCYTES # BLD AUTO: >0.5 THOUSAND/UL (ref 0–0.2)
IMM GRANULOCYTES NFR BLD AUTO: 7 % (ref 0–2)
LYMPHOCYTES # BLD AUTO: 0.81 THOUSAND/UL (ref 0.6–4.47)
LYMPHOCYTES # BLD AUTO: 1.55 THOUSANDS/ΜL (ref 0.6–4.47)
LYMPHOCYTES # BLD AUTO: 5 % (ref 14–44)
LYMPHOCYTES NFR BLD AUTO: 9 % (ref 14–44)
MCH RBC QN AUTO: 28.6 PG (ref 26.8–34.3)
MCH RBC QN AUTO: 28.7 PG (ref 26.8–34.3)
MCHC RBC AUTO-ENTMCNC: 32.4 G/DL (ref 31.4–37.4)
MCHC RBC AUTO-ENTMCNC: 32.7 G/DL (ref 31.4–37.4)
MCV RBC AUTO: 88 FL (ref 82–98)
MCV RBC AUTO: 88 FL (ref 82–98)
MONOCYTES # BLD AUTO: 1.3 THOUSAND/UL (ref 0–1.22)
MONOCYTES # BLD AUTO: 1.98 THOUSAND/ΜL (ref 0.17–1.22)
MONOCYTES NFR BLD AUTO: 12 % (ref 4–12)
MONOCYTES NFR BLD: 8 % (ref 4–12)
MYELOCYTES NFR BLD MANUAL: 1 % (ref 0–1)
NEUTROPHILS # BLD AUTO: 12.36 THOUSANDS/ΜL (ref 1.85–7.62)
NEUTROPHILS # BLD MANUAL: 13.62 THOUSAND/UL (ref 1.85–7.62)
NEUTS BAND NFR BLD MANUAL: 3 % (ref 0–8)
NEUTS SEG NFR BLD AUTO: 71 % (ref 43–75)
NEUTS SEG NFR BLD AUTO: 81 % (ref 43–75)
NRBC BLD AUTO-RTO: 0 /100 WBCS
NRBC BLD AUTO-RTO: 0 /100 WBCS
PLATELET # BLD AUTO: 203 THOUSANDS/UL (ref 149–390)
PLATELET # BLD AUTO: 205 THOUSANDS/UL (ref 149–390)
PLATELET BLD QL SMEAR: ADEQUATE
PMV BLD AUTO: 10.3 FL (ref 8.9–12.7)
PMV BLD AUTO: 10.8 FL (ref 8.9–12.7)
POLYCHROMASIA BLD QL SMEAR: PRESENT
POTASSIUM SERPL-SCNC: 4.4 MMOL/L (ref 3.5–5.3)
POTASSIUM SERPL-SCNC: 4.7 MMOL/L (ref 3.5–5.3)
PROT SERPL-MCNC: 6.3 G/DL (ref 6.4–8.2)
RBC # BLD AUTO: 2.37 MILLION/UL (ref 3.81–5.12)
RBC # BLD AUTO: 2.55 MILLION/UL (ref 3.81–5.12)
RH BLD: POSITIVE
SODIUM SERPL-SCNC: 130 MMOL/L (ref 136–145)
SODIUM SERPL-SCNC: 131 MMOL/L (ref 136–145)
SPECIMEN EXPIRATION DATE: NORMAL
TOTAL CELLS COUNTED SPEC: 100
VARIANT LYMPHS # BLD AUTO: 2 %
WBC # BLD AUTO: 16.22 THOUSAND/UL (ref 4.31–10.16)
WBC # BLD AUTO: 17.24 THOUSAND/UL (ref 4.31–10.16)

## 2019-09-26 PROCEDURE — 85014 HEMATOCRIT: CPT | Performed by: PHYSICIAN ASSISTANT

## 2019-09-26 PROCEDURE — G8987 SELF CARE CURRENT STATUS: HCPCS

## 2019-09-26 PROCEDURE — 86923 COMPATIBILITY TEST ELECTRIC: CPT

## 2019-09-26 PROCEDURE — G8979 MOBILITY GOAL STATUS: HCPCS

## 2019-09-26 PROCEDURE — 99233 SBSQ HOSP IP/OBS HIGH 50: CPT | Performed by: INTERNAL MEDICINE

## 2019-09-26 PROCEDURE — G8988 SELF CARE GOAL STATUS: HCPCS

## 2019-09-26 PROCEDURE — 97166 OT EVAL MOD COMPLEX 45 MIN: CPT

## 2019-09-26 PROCEDURE — 86900 BLOOD TYPING SEROLOGIC ABO: CPT | Performed by: PHYSICIAN ASSISTANT

## 2019-09-26 PROCEDURE — 85007 BL SMEAR W/DIFF WBC COUNT: CPT | Performed by: INTERNAL MEDICINE

## 2019-09-26 PROCEDURE — 30233N1 TRANSFUSION OF NONAUTOLOGOUS RED BLOOD CELLS INTO PERIPHERAL VEIN, PERCUTANEOUS APPROACH: ICD-10-PCS | Performed by: INTERNAL MEDICINE

## 2019-09-26 PROCEDURE — 99232 SBSQ HOSP IP/OBS MODERATE 35: CPT | Performed by: INTERNAL MEDICINE

## 2019-09-26 PROCEDURE — 97163 PT EVAL HIGH COMPLEX 45 MIN: CPT

## 2019-09-26 PROCEDURE — G8978 MOBILITY CURRENT STATUS: HCPCS

## 2019-09-26 PROCEDURE — 86901 BLOOD TYPING SEROLOGIC RH(D): CPT | Performed by: PHYSICIAN ASSISTANT

## 2019-09-26 PROCEDURE — 99024 POSTOP FOLLOW-UP VISIT: CPT | Performed by: PHYSICIAN ASSISTANT

## 2019-09-26 PROCEDURE — 85025 COMPLETE CBC W/AUTO DIFF WBC: CPT | Performed by: INTERNAL MEDICINE

## 2019-09-26 PROCEDURE — C9113 INJ PANTOPRAZOLE SODIUM, VIA: HCPCS | Performed by: HOSPITALIST

## 2019-09-26 PROCEDURE — 85018 HEMOGLOBIN: CPT | Performed by: PHYSICIAN ASSISTANT

## 2019-09-26 PROCEDURE — 80048 BASIC METABOLIC PNL TOTAL CA: CPT | Performed by: INTERNAL MEDICINE

## 2019-09-26 PROCEDURE — 86850 RBC ANTIBODY SCREEN: CPT | Performed by: PHYSICIAN ASSISTANT

## 2019-09-26 PROCEDURE — 99233 SBSQ HOSP IP/OBS HIGH 50: CPT | Performed by: OBSTETRICS & GYNECOLOGY

## 2019-09-26 PROCEDURE — 80053 COMPREHEN METABOLIC PANEL: CPT | Performed by: INTERNAL MEDICINE

## 2019-09-26 PROCEDURE — 85027 COMPLETE CBC AUTOMATED: CPT | Performed by: INTERNAL MEDICINE

## 2019-09-26 RX ORDER — DOCUSATE SODIUM 100 MG/1
100 CAPSULE, LIQUID FILLED ORAL DAILY PRN
Status: DISCONTINUED | OUTPATIENT
Start: 2019-09-26 | End: 2019-09-29 | Stop reason: HOSPADM

## 2019-09-26 RX ADMIN — ENOXAPARIN SODIUM 90 MG: 100 INJECTION SUBCUTANEOUS at 06:29

## 2019-09-26 RX ADMIN — VENLAFAXINE 75 MG: 37.5 TABLET ORAL at 17:15

## 2019-09-26 RX ADMIN — CEFAZOLIN SODIUM 2000 MG: 2 SOLUTION INTRAVENOUS at 14:10

## 2019-09-26 RX ADMIN — POTASSIUM CHLORIDE 40 MEQ: 1500 TABLET, EXTENDED RELEASE ORAL at 17:15

## 2019-09-26 RX ADMIN — VITAMIN D, TAB 1000IU (100/BT) 1000 UNITS: 25 TAB at 09:01

## 2019-09-26 RX ADMIN — PANTOPRAZOLE SODIUM 40 MG: 40 INJECTION, POWDER, FOR SOLUTION INTRAVENOUS at 09:01

## 2019-09-26 RX ADMIN — POTASSIUM CHLORIDE 40 MEQ: 1500 TABLET, EXTENDED RELEASE ORAL at 09:01

## 2019-09-26 RX ADMIN — ENOXAPARIN SODIUM 90 MG: 100 INJECTION SUBCUTANEOUS at 17:15

## 2019-09-26 RX ADMIN — LORAZEPAM 1 MG: 1 TABLET ORAL at 22:02

## 2019-09-26 RX ADMIN — VENLAFAXINE 75 MG: 37.5 TABLET ORAL at 22:02

## 2019-09-26 RX ADMIN — CEFAZOLIN SODIUM 2000 MG: 2 SOLUTION INTRAVENOUS at 22:02

## 2019-09-26 RX ADMIN — CEFAZOLIN SODIUM 2000 MG: 2 SOLUTION INTRAVENOUS at 06:30

## 2019-09-26 RX ADMIN — VENLAFAXINE 75 MG: 37.5 TABLET ORAL at 09:02

## 2019-09-26 RX ADMIN — MUPIROCIN: 20 OINTMENT TOPICAL at 17:15

## 2019-09-26 RX ADMIN — ASPIRIN 81 MG: 81 TABLET, COATED ORAL at 09:01

## 2019-09-26 RX ADMIN — MUPIROCIN: 20 OINTMENT TOPICAL at 22:02

## 2019-09-26 RX ADMIN — DOCUSATE SODIUM 100 MG: 100 CAPSULE, LIQUID FILLED ORAL at 01:15

## 2019-09-26 RX ADMIN — MUPIROCIN: 20 OINTMENT TOPICAL at 09:02

## 2019-09-26 RX ADMIN — ARIPIPRAZOLE 10 MG: 10 TABLET ORAL at 09:02

## 2019-09-26 RX ADMIN — OXYBUTYNIN CHLORIDE 10 MG: 5 TABLET, EXTENDED RELEASE ORAL at 09:01

## 2019-09-26 NOTE — PHYSICAL THERAPY NOTE
Physical Therapy Evaluation Note     Patient Name: Aliza Morris    Today's Date: 9/26/2019     Problem List  Principal Problem:    Sepsis [facility])  Active Problems:    Benign essential hypertension    Endometrial cancer ([facility] )    Acute deep vein thrombosis (DVT) of proximal vein of lower extremity (HCC)    Anemia due to chemotherapy    Acute kidney injury ([facility] )    Volume overload    Hypokalemia    Hyponatremia    MSSA bacteremia    Acute respiratory failure (HCC)    Cough       Past Medical History  Past Medical History:   Diagnosis Date    Anemia     Chemotherapy follow-up examination     Depression     Diabetes mellitus ([facility] )     Endometrial cancer ([facility] ) 12/2017    Hyperglycemia     vx type 2 dm -- last assessed 4/1/14; resolved 11/7/17    Hyperlipidemia     Hypertension     Obesity     last assessed 10/14/17; resolved 11/7/17        Past Surgical History  Past Surgical History:   Procedure Laterality Date    ABDOMINAL SURGERY      GASTRIC BYPASS    CHOLECYSTECTOMY      at the time of gastric bypass    COLONOSCOPY      CT NEEDLE BIOPSY LYMPH NODE  7/8/2019    GASTRIC BYPASS      HYSTERECTOMY Bilateral     total abdominal with salpingo-oophorectomy    IR PORT PLACEMENT  7/26/2019    IR PORT REMOVAL  9/20/2019    IR TUBE PLACEMENT NEPHROSTOMY  7/26/2019    OOPHORECTOMY Bilateral     RI LAP, RADICAL HYST W/ TUBE&OV, NODE BX N/A 12/19/2017    Procedure: HYSTERECTOMY LAPAROSCOPIC TOTAL ([address]) W/ ROBOTICS; BILATERAL SALPINGOOPHERECTOMY; LYMPH NODE DISSECTION; lysis of adhesions;  Surgeon: Sharyn Sebastian MD;  [facility]: [facility];  Service: Gynecology Oncology    RI LAP,DIAGNOSTIC ABDOMEN N/A 12/19/2017    Procedure: LAPAROSCOPY DIAGNOSTIC;  Surgeon: Sharyn Sebastian MD;  [facility];  Service: Gynecology Oncology    TONSILLECTOMY      US GUIDED BREAST BIOPSY RIGHT COMPLETE Right 6/28/2019 09/26/19 0304   Note Type   Note type Eval only   Pain Assessment   Pain Assessment Amador-Baker FACES   Amador-Baker FACES Pain Rating 0   Home Living   Type of Home House   Additional Comments Pt has 2 RAJANI with HR on 1 step  Pt lives in a multi story home with mom  Pt's mother is limited in the amount of assistance she can give  Pt's sister is able to help upon d/c home  Pt has the option of a FFSU and sleeping in the recliner  Pt used a quad cane prior and owns a rollator  Pt was I for mobility and ADL's prior and worked part time  Prior Function   Level of [place] Independent with ADLs and functional mobility   Lives With [facility] Help From Family   ADL Assistance Independent   IADLs Independent   Falls in the last 6 months 0   Vocational Part time employment   Comments (+) drives    Restrictions/Precautions   Weight Bearing Precautions Per Order No   Other Precautions Fall Risk;Bed Alarm; Chair Alarm   General   Family/Caregiver Present No   Cognition   Overall Cognitive Status WFL   Arousal/Participation Alert   Orientation Level Oriented X4   Memory Within functional limits   Following Commands Follows all commands and directions without difficulty   RLE Assessment   RLE Assessment WFL   LLE Assessment   LLE Assessment WFL   Coordination   Movements are Fluid and Coordinated 1   Bed Mobility   Rolling R 4  Minimal assistance   Additional items Assist x 2   Supine to Sit 4  Minimal assistance   Additional items Assist x 2   Sit to Supine 4  Minimal assistance   Additional items Assist x 2   Transfers   Sit to Stand 4  Minimal assistance   Additional items Assist x 1   Stand to Sit 4  Minimal assistance   Additional items Assist x 1   Ambulation/Elevation   Gait pattern Shuffling; Forward Flexion   Gait Assistance 5  Supervision   Additional items Assist x 1   Assistive Device Rolling walker   Distance 40ft   Balance   Static Sitting Good   Dynamic Sitting Fair +   Static Standing Fair +   Dynamic Standing Fair +   Ambulatory Fair + Endurance Deficit   Endurance Deficit Yes   Activity Tolerance   Activity Tolerance Patient limited by fatigue   Medical Staff Made Aware OT CM   Nurse Made Aware nurse approved therapy session   Assessment   Prognosis Good   Problem List Decreased strength;Decreased endurance; Impaired balance;Decreased mobility   Assessment Pt is a 61 yo female admitted to [place] [address] on 9/19/2019 s/p generalized weakness and fever  DX: cough, acute respiratory failure, MSSA bacteria, hypokalemia, volume overload, acute kidney injury, sepsis  Two patient identifiers were used to confirm  Pt lives with mom in a 2 story home  Pt has 2 RAJANI  Pt's moms assistance is limited, but her sister can assist upon d c home  Pt was I for ADL's and mobility with the use of a quad cane prior  Pt works part time  Imaging includes: chest abdomen CT: no definite evidence of metastatic disease in chest  Pt's impairments include reduced mobility, fall risk, limited endurance, decreased B LE strength  These impairments limit the ability of the patient to perform mobility without increased assistance, return to PLOF and participate in everyday life activities  Pt would benefit from continued skilled therapy while in the hospital to improve overall mobility and return to PLOF  Recommend discharge to home with home PT  At the end of the session the patient was left in supine position with call bell and phone within reach  Pt required assistance for bed mobility but will be sleeping in a recliner upon d c home  Pt educated about placing chairs throughout her home if she gets tired and needs to sit  Barriers to Discharge Inaccessible home environment   Goals   STG Expiration Date 10/06/19   Short Term Goal #1 STG1: Pt will perform bed mobility at a I level to return to baseline  STG 2: Pt will transfer at a MI to reduce level of assistance upon d c home  STG 3: Pt will  Ambulate 300ft with RW at a MI level to facilitate improved mobility      PT Treatment Day 0   Plan   Treatment/Interventions Functional transfer training;LE strengthening/ROM; Therapeutic exercise; Endurance training;Elevations; Bed mobility;Gait training   PT Frequency   (3-5xwk)   Recommendation   Recommendation Home PT   Equipment Recommended Walker   PT - OK to Discharge No   Additional Comments need to trial steps and ambualte    Modified [place] Scale   Modified [place] Scale 2   Barthel Index   Feeding 10   Bathing 0   Grooming Score 0   Dressing Score 5   Bladder Score 10   Bowels Score 10   Toilet Use Score 5   Transfers (Bed/Chair) Score 10   Mobility (Level Surface) Score 0   Stairs Score 0   Barthel Index Score 50   Yvonne Dorantes, Pt, DPT

## 2019-09-26 NOTE — RESTORATIVE TECHNICIAN NOTE
Restorative Specialist Mobility Note       Activity: Ambulate in mehta, Ambulate in room, Bathroom privileges, Chair, Dangle, Stand at bedside(Educated/encouraged pt to ambulate with assistance 3-4 x's/day  Bed alarm on   Pt callbell, phone/tray within reach )     Assistive Device: Front wheel walker       [employer] BS, Restorative Technician, [employer]

## 2019-09-26 NOTE — PLAN OF CARE
Problem: PHYSICAL THERAPY ADULT  Goal: Performs mobility at highest level of function for planned discharge setting  See evaluation for individualized goals  Description    Note:   Prognosis: Good  Problem List: Decreased strength, Decreased endurance, Impaired balance, Decreased mobility  Assessment: Pt is a 59 yo female admitted to [place] [address] on 9/19/2019 s/p generalized weakness and fever  DX: cough, acute respiratory failure, MSSA bacteria, hypokalemia, volume overload, acute kidney injury, sepsis  Two patient identifiers were used to confirm  Pt lives with mom in a 2 story home  Pt has 2 RAJANI  Pt's moms assistance is limited, but her sister can assist upon d c home  Pt was I for ADL's and mobility with the use of a quad cane prior  Pt works part time  Imaging includes: chest abdomen CT: no definite evidence of metastatic disease in chest  Pt's impairments include reduced mobility, fall risk, limited endurance, decreased B LE strength  These impairments limit the ability of the patient to perform mobility without increased assistance, return to PLOF and participate in everyday life activities  Pt would benefit from continued skilled therapy while in the hospital to improve overall mobility and return to PLOF  Recommend discharge to home with home PT  At the end of the session the patient was left in supine position with call bell and phone within reach  Pt required assistance for bed mobility but will be sleeping in a recliner upon d c home  Pt educated about placing chairs throughout her home if she gets tired and needs to sit  Barriers to Discharge: Inaccessible home environment     Recommendation: Home PT     PT - OK to Discharge: No    See flowsheet documentation for full assessment

## 2019-09-26 NOTE — OCCUPATIONAL THERAPY NOTE
[address] Evaluation     Patient Name: Drew Feliciano  Today's Date: 9/26/2019  Problem List  Principal Problem:    Sepsis [facility])  Active Problems:    Benign essential hypertension    Endometrial cancer ([facility] )    Acute deep vein thrombosis (DVT) of proximal vein of lower extremity (HCC)    Anemia due to chemotherapy    Acute kidney injury ([facility] )    Volume overload    Hypokalemia    Hyponatremia    MSSA bacteremia    Acute respiratory failure (HCC)    Cough    Past Medical History  Past Medical History:   Diagnosis Date    Anemia     Chemotherapy follow-up examination     Depression     Diabetes mellitus ([facility] )     Endometrial cancer ([facility] ) 12/2017    Hyperglycemia     vx type 2 dm -- last assessed 4/1/14; resolved 11/7/17    Hyperlipidemia     Hypertension     Obesity     last assessed 10/14/17; resolved 11/7/17     Past Surgical History  Past Surgical History:   Procedure Laterality Date    ABDOMINAL SURGERY      GASTRIC BYPASS    CHOLECYSTECTOMY      at the time of gastric bypass    COLONOSCOPY      CT NEEDLE BIOPSY LYMPH NODE  7/8/2019    GASTRIC BYPASS      HYSTERECTOMY Bilateral     total abdominal with salpingo-oophorectomy    IR PORT PLACEMENT  7/26/2019    IR PORT REMOVAL  9/20/2019    IR TUBE PLACEMENT NEPHROSTOMY  7/26/2019    OOPHORECTOMY Bilateral     NH LAP, RADICAL HYST W/ TUBE&OV, NODE BX N/A 12/19/2017    Procedure: HYSTERECTOMY LAPAROSCOPIC TOTAL ([address]) W/ ROBOTICS; BILATERAL SALPINGOOPHERECTOMY; LYMPH NODE DISSECTION; lysis of adhesions;  Surgeon: Ernesto Parisi MD;  [facility]: [facility];  Service: Gynecology Oncology    NH LAP,DIAGNOSTIC ABDOMEN N/A 12/19/2017    Procedure: LAPAROSCOPY DIAGNOSTIC;  Surgeon: Ernesto Parisi MD;  [facility];  Service: Gynecology Oncology    TONSILLECTOMY      US GUIDED BREAST BIOPSY RIGHT COMPLETE Right 6/28/2019       Time: 7652-4716         09/26/19 7198   Note Type   Note type Eval only   Restrictions/Precautions Weight Bearing Precautions Per Order No   Other Precautions Fall Risk;Bed Alarm   Pain Assessment   Pain Assessment No/denies pain   Pain Score No Pain   Home Living   Type of Home House   Home Layout Two level;Performs ADLs on one level; Able to live on main level with bedroom/bathroom;Stairs to enter with rails  (full bath 1st floor  can have 1st floor s/u at DC )   Bathroom Shower/Tub Walk-in shower   Bathroom Toilet Standard   Bathroom Equipment Grab bars in [address] cane;Walker  (+ rollator)   Additional Comments lives in [facility] with 2STE   Prior Function   Level of [place] Independent with ADLs and functional mobility  (+ IADLs)   Lives With [facility], [address] in the last 6 months 0   Vocational Part time employment   Comments (+) drives   Lifestyle   Autonomy PTA pt was indep with ADLs, IADLs and functional mobility using a quad cane   Reciprocal Relationships family   Service to Others part time employment   Intrinsic Gratification family, tv   Subjective   Subjective I'm tired   ADL   Eating Assistance 5  Supervision/Setup   Grooming Assistance 5  [address] [facility] 5  Supervision/Setup   LB [address] 4  [address] 5  Supervision/Setup   LB Dressing Assistance 4  [address]  4  Minimal Assistance   Bed Mobility   Rolling R 4  Minimal assistance   Additional items Assist x 2; Increased time required;Verbal cues   Supine to Sit 4  Minimal assistance   Additional items Assist x 2;Bedrails;HOB elevated; Increased time required;Verbal cues;LE management   Sit to Supine 4  Minimal assistance   Additional items Assist x 2; Increased time required;Verbal cues;LE management  (HOB flat w/ use of SR)   Transfers   Sit to Stand 4  Minimal assistance   Additional items Assist x 1; Increased time required;Verbal cues   Stand to Sit 4  Minimal assistance   Additional items Assist x 1; Increased time required;Verbal cues   Stand pivot 4  Minimal assistance   Additional items Assist x 1; Increased time required   Functional Mobility   Functional Mobility 5  Supervision   Additional Comments Ax1, increased time   Additional items Rolling walker   Balance   Static Sitting Good   Dynamic Sitting Fair +   Static Standing Fair   Dynamic Standing Fair -   Ambulatory Fair -   Activity Tolerance   Activity Tolerance Patient limited by fatigue   Medical Staff Made Aware PT Yvonne   Nurse Made Aware OK to see per RN Viridiana   RUE Assessment   RUE Assessment WFL  (mmt 3+/5 grossly)   LUE Assessment   LUE Assessment WFL  (mmt 3+/5 grossly)   Hand Function   Gross Motor Coordination Functional   Fine Motor Coordination Functional   Sensation   Light Touch No apparent deficits   Sharp/Dull No apparent deficits   Vision-Basic Assessment   Current Vision Wears glasses all the time   Patient Visual Report   (no acute visual changes reported)   Cognition   Overall Cognitive Status WFL   Arousal/Participation Alert; Responsive; Cooperative;Arousable   Attention Within functional limits   Orientation Level Oriented X4   Memory Within functional limits   Following Commands Follows all commands and directions without difficulty   Assessment   Limitation Decreased ADL status; Decreased UE strength;Decreased endurance;Decreased fine motor control;Decreased high-level ADLs; Decreased self-care trans   Prognosis Good   Assessment Pt is a 60 y o  female seen for OT evaluation s/p admit to [facility] on 9/19/2019 w/ Sepsis ([facility] )  Comorbidities affecting pt's functional performance at time of assessment include: hypertension, endometrial cancer, acute DVT, anemia, MONROE, volume overload, hypokalemia (please see extensive list of comorbidities)   Personal factors affecting pt at time of IE include:steps to enter environment, difficulty performing ADLS, difficulty performing IADLS , level of education, limited insight into deficits, decreased initiation and engagement , financial barriers, health management  and environment  Prior to admission, pt was indep with ADLs, IADLS and functional mobility with a quad cane  Upon evaluation: Pt requires min A x2 for bed mobility, min A x1 for functional transfers and supervision for functional mobility with RW  Patient with elevated HR at rest (103BPM) and requires increased time to complete ADLs  O2 stable throughout  UB ADLs completed with supervision, LB ADLs completed with min A however pt states nieces who are local, and mother who she lives with is able to assist at DC  Patient presents with the following deficits impacting occupational performance: weakness, decreased strength, decreased balance, decreased tolerance and decreased coping skills  Pt to benefit from continued skilled OT tx while in the hospital to address deficits as defined above and maximize level of functional independence w ADL's and functional mobility  Occupational Performance areas to address include: eating, grooming, bathing/shower, toilet hygiene, dressing, medication management, socialization, health maintenance, functional mobility, community mobility, clothing management, cleaning, meal prep, money management, household maintenance and social participation  From OT standpoint, recommendation at time of d/c would be home with family support and home OT services  Based on findings, patient is of moderate complexity  Goals   Patient Goals to get better   Long Term Goal see below   Plan   Treatment Interventions ADL retraining;Functional transfer training; Endurance training;UE strengthening/ROM; Patient/family training;Equipment evaluation/education; Compensatory technique education; Energy conservation; Activityengagement   Goal Expiration Date 10/06/19   OT Treatment Day 0   OT Frequency 3-5x/wk   Recommendation   OT Discharge Recommendation Home with family support  (+ home OT)   OT - OK to Discharge No   Barthel Index   Feeding 10   Bathing 0   Grooming Score 0   Dressing Score 5   Bladder Score 10   Bowels Score 10   Toilet Use Score 5   Transfers (Bed/Chair) Score 10   Mobility (Level Surface) Score 0   Stairs Score 0   Barthel Index Score 50       GOALS    1) Pt will improve activity tolerance to G for min 30 min txment sessions    2) Pt will complete UB/LB dressing/self care w/ mod I using adaptive device and DME as needed    3) Pt will complete bathing w/ Mod I w/ use of AE and DME as needed    4) Pt will complete toileting w/ mod I w/ G hygiene/thoroughness using DME as needed    5) Pt will improve functional transfers to Mod I on/off all surfaces using DME as needed w/ G balance/safety     6) Pt will improve functional mobility during ADL/IADL/leisure tasks to Mod I using DME as needed w/ G balance/safety     7) Pt will demonstrate G carryover of pt/caregiver education and training as appropriate w/ mod I w/o cues w/ good tolerance    8) Pt will demonstrate 100% carryover of energy conservation techniques w/ mod I t/o functional I/ADL/leisure tasks w/o cues s/p skilled education          Brian Smith MS, OTR/L

## 2019-09-26 NOTE — PROGRESS NOTES
Progress Note - Infectious Disease   David Ashraf Pulcini 60 y o  female MRN: [id number]  Unit/Bed#: [facility] 912-01 Encounter: [id number]      Impression/Plan:    22-year-old female patient with endometrial cancer currently treated with chemotherapy through a right chest wall Port-A-Cath, presenting for fever and severe sepsis found to have MSSA BSI, port infection and right IJ septic phlebitis and possible pulmonary septic emboli     1- severe sepsis, POA:  With fever, hypotension that responded to IVF, MONROE on admission, now resolved  Sepsis is secondary to #2  - supportive care and IVF as per primary team  - continue antibiotics as below        2- Complicated MSSA bacteremia and Port-A-Cath infection:  Blood culture collected on admission on 09/19/2019 is positive for Staph aureus in 1/1 set; Bcx 09/20 and 09/21/19 remain positive; Bcx 09/23/19 negative to date  TTE negative  TRINH unsuccessful    CT chest showing right IJ septic thrombophlebitis     - continue cefazolin; follow up result of Bcx 09/23/19  - if Bcx from 09/23/19 remain negative in next 24h, can insert picc line  Patient will need 6 weeks of iv cefazolin, tentative end date   - repeat CBC, close clinical follow-up and serial exams  - As risk of TRINH is high because of possible esophageal pathology, and TTE is negative for vegetations,  can hold off on repeating TRINH  Patient will need 6 weeks of iv abx therapy due to septic thrombophlebitis and MSSA BSI  Tentative end date for abx 11/04/2019      3- right IJ septic thrombophlebitis:  CT chest showing filling defect over right IJ  Also patient had erythema over the right IJ vein on admission  Patient on [facility]  - continue abx as above     - continue close clinical monitoring     4- Endometrial cancer:  Last chemotherapy received 09/09/2019  - GYN-oncology follow-up        5- acute kidney injury POA:  Now resolved with Creat today 0 85 mg/dl  - repeat BMP  - avoid nephrotoxic meds     6- acute respiratory failure POA:   Chest x-ray showing vascular congestion  CT chest suggestive of possible septic emboli  Patient reports significant  improvement in her shortness of breath  - monitor clinically  - continue abx as above       Case discussed with primary team     Antibiotics:  Antibiotics day 8  cefazolin day 5      Subjective:  Patient has no fever, chills, sweats; no nausea, vomiting, diarrhea; no cough, shortness of breath; no pain  No new symptoms  Objective:  Vitals:  Temp:  [98 2 °F (36 8 °C)-99 9 °F (37 7 °C)] 98 4 °F (36 9 °C)  HR:  [] 87  Resp:  [18-20] 18  BP: (100-137)/(57-79) 121/73  SpO2:  [95 %-99 %] 99 %  Temp (24hrs), Av 9 °F (37 2 °C), Min:98 2 °F (36 8 °C), Max:99 9 °F (37 7 °C)  Current: Temperature: 98 4 °F (36 9 °C)    Physical Exam:   General Appearance:  Alert, interactive, nontoxic, no acute distress  Throat: Oropharynx moist without lesions  Lungs:   Clear to auscultation bilaterally; no wheezes, rhonchi or rales; respirations unlabored   Heart:  RRR; no murmur, rub or gallop   Abdomen:   Soft, non-tender, non-distended, positive bowel sounds  Extremities: No clubbing, cyanosis or edema   Skin: No new rashes or lesions  No draining wounds noted  Site of previous port-o-cath in right chest wall is not tender, not swollen          Labs, Imaging, & Other studies:   All pertinent labs and imaging studies were personally reviewed  Results from last 7 days   Lab Units 19  0858 19  0508 19  0514 19  0442   WBC Thousand/uL  --  16 22* 17 15* 19 10*   HEMOGLOBIN g/dL 7 5* 6 8* 7 3* 7 5*   PLATELETS Thousands/uL  --  205 250 223     Results from last 7 days   Lab Units 19  0508 19  0514 19  0442  19  1711   SODIUM mmol/L 130* 135* 132*   < > 129*   POTASSIUM mmol/L 4 7 3 8 3 0*   < > 2 9*   CHLORIDE mmol/L 101 102 100   < > 97*   CO2 mmol/L 21 25 24   < > 18*   BUN mg/dL 21 14 11   < > 37*   CREATININE mg/dL 0 85 0 71 0 54*   < > 1 91*   EGFR ml/min/1 73sq m 74 92 101   < > 28   CALCIUM mg/dL 8 2* 8 8 8 6   < > 8 6   AST U/L 36  --   --   --  55*   ALT U/L 25  --   --   --  41   ALK PHOS U/L 95  --   --   --  91    < > = values in this interval not displayed  Results from last 7 days   Lab Units 09/24/19  1447 09/24/19  1433 09/23/19  1353 09/23/19  1338 09/21/19  0536 09/21/19  0535 09/20/19  1058 09/20/19  1057 09/19/19  1731 09/19/19  1711   BLOOD CULTURE  No Growth at 24 hrs  No Growth at 24 hrs  No Growth at 48 hrs  No Growth at 48 hrs   Staphylococcus aureus* Staphylococcus aureus* Staphylococcus aureus* Staphylococcus aureus*  --  Staphylococcus aureus*   GRAM STAIN RESULT   --   --   --   --   --  Gram positive cocci in clusters* Gram positive cocci in clusters* Gram positive cocci in clusters*  --  Gram positive cocci in clusters*   URINE CULTURE   --   --   --   --   --   --   --   --  60,000-69,000 cfu/ml Coagulase negative Staphylococcus species*  60,000-69,000 cfu/ml Corynebacterium jeikeium*  --      Results from last 7 days   Lab Units 09/22/19  0530 09/20/19  0515 09/19/19  1711   PROCALCITONIN ng/ml 0 92* 4 97* 4 39*

## 2019-09-26 NOTE — PLAN OF CARE
Problem: OCCUPATIONAL THERAPY ADULT  Goal: Performs self-care activities at highest level of function for planned discharge setting  See evaluation for individualized goals  Description  Treatment Interventions: ADL retraining, Functional transfer training, Endurance training, UE strengthening/ROM, Patient/family training, Equipment evaluation/education, Compensatory technique education, Energy conservation, Activityengagement          See flowsheet documentation for full assessment, interventions and recommendations  Note:   Limitation: Decreased ADL status, Decreased UE strength, Decreased endurance, Decreased fine motor control, Decreased high-level ADLs, Decreased self-care trans  Prognosis: Good  Assessment: Pt is a 60 y o  female seen for OT evaluation s/p admit to [facility] on 9/19/2019 w/ Sepsis ([address] )  Comorbidities affecting pt's functional performance at time of assessment include: hypertension, endometrial cancer, acute DVT, anemia, MONROE, volume overload, hypokalemia (please see extensive list of comorbidities)  Personal factors affecting pt at time of IE include:steps to enter environment, difficulty performing ADLS, difficulty performing IADLS , level of education, limited insight into deficits, decreased initiation and engagement , financial barriers, health management  and environment  Prior to admission, pt was indep with ADLs, IADLS and functional mobility with a quad cane  Upon evaluation: Pt requires min A x2 for bed mobility, min A x1 for functional transfers and supervision for functional mobility with RW  Patient with elevated HR at rest (103BPM) and requires increased time to complete ADLs  O2 stable throughout  UB ADLs completed with supervision, LB ADLs completed with min A however pt states nieces who are local, and mother who she lives with is able to assist at DC    Patient presents with the following deficits impacting occupational performance: weakness, decreased strength, decreased balance, decreased tolerance and decreased coping skills  Pt to benefit from continued skilled OT tx while in the hospital to address deficits as defined above and maximize level of functional independence w ADL's and functional mobility  Occupational Performance areas to address include: eating, grooming, bathing/shower, toilet hygiene, dressing, medication management, socialization, health maintenance, functional mobility, community mobility, clothing management, cleaning, meal prep, money management, household maintenance and social participation  From OT standpoint, recommendation at time of d/c would be home with family support and home OT services  Based on findings, patient is of moderate complexity         OT Discharge Recommendation: Home with family support(+ home OT)  OT - OK to Discharge: No

## 2019-09-26 NOTE — PROGRESS NOTES
Progress Note -Interventional Radiology DANIEL Melendez 60 y o  female MRN: [id number]  Unit/Bed#: [facility] 912-01 Encounter: [id number]    Assessment:    60year old female presenting with MSSA sepsis, s/p port removal 9/20/19, continuing with leukocytosis    Plan:    - I removed 4 sutures from old port site at bedside today  - appreciate ID recommendations for elevated WBC, abx use  - repeat blood cultures from 9/24/19 negative after 24 hours  - please reach out to IR with any future questions     Patient Active Problem List   Diagnosis    Benign essential hypertension    Major depression, chronic    Dyslipidemia    Endometrial cancer ([facility] )    Class 3 severe obesity with body mass index (BMI) of 45 0 to 49 9 in adult ([facility] )    Prediabetes    Acute deep vein thrombosis (DVT) of proximal vein of lower extremity ([facility] )    Breast cancer screening    Other hydronephrosis    Encounter for central line care    Cancer related pain    Therapeutic opioid induced constipation    Chemotherapy induced neutropenia (HCC)    Anemia due to chemotherapy    Dehydration    Chemotherapy-induced nausea    Sepsis (HCC)    Acute kidney injury ([facility] )    Volume overload    Hypokalemia    Hyponatremia    MSSA bacteremia    Acute respiratory failure (HCC)    Cough          Subjective: Pt very fatigued  She is able to be aroused but automatically falls back to sleep  I was able to remove 4 sutures from old port site today  WBC 16 today, continues to be elevated  Objective:    Vitals:  /73   Pulse 96   Temp 98 4 °F (36 9 °C)   Resp 18   Wt 89 5 kg (197 lb 6 4 oz)   LMP  (LMP Unknown)   SpO2 95%   BMI 39 87 kg/m²   Body mass index is 39 87 kg/m²    Weight (last 2 days)     Date/Time   Weight    09/26/19 0600   89 5 (197 4)    09/25/19 0600   89 (196 2)    09/24/19 0540   89 1 (196 43)              I/Os:    Intake/Output Summary (Last 24 hours) at 9/26/2019 0922  Last data filed at 9/26/2019 0511  Gross per 24 hour   Intake 100 ml   Output 600 ml   Net -500 ml       Invasive Devices     Peripheral Intravenous Line            Peripheral IV 09/22/19 Right Antecubital 3 days          Drain            Percutaneous Nephroureteral Tube (PCNU) Right 1 8 5 Fr 26 Cm 61 days    External Urinary Catheter 3 days                Physical Exam:  General appearance: fatigued  Lungs: clear to auscultation bilaterally  Heart: regular rate and rhythm, S1, S2 normal, no murmur, click, rub or gallop  Skin: incision to right chest c/d/i  no erythema   sutures removed                Lab Results and Cultures:   CBC with diff:   Lab Results   Component Value Date    WBC 16 22 (H) 09/26/2019    HGB 7 5 (L) 09/26/2019    HCT 23 6 (L) 09/26/2019    MCV 88 09/26/2019     09/26/2019    MCH 28 7 09/26/2019    MCHC 32 7 09/26/2019    RDW 16 9 (H) 09/26/2019    MPV 10 8 09/26/2019    NRBC 0 09/26/2019      BMP/CMP:  Lab Results   Component Value Date     10/27/2017    K 4 7 09/26/2019    K 4 2 11/01/2018     09/26/2019     11/01/2018    CO2 21 09/26/2019    CO2 24 11/01/2018    BUN 21 09/26/2019    BUN 12 11/01/2018    CREATININE 0 85 09/26/2019    CREATININE 0 68 10/27/2017    GLUCOSE 219 (H) 12/19/2017    GLUCOSE 111 (H) 10/27/2017    CALCIUM 8 2 (L) 09/26/2019    CALCIUM 9 4 10/27/2017    AST 36 09/26/2019    AST 12 11/01/2018    ALT 25 09/26/2019    ALT 14 11/01/2018    ALKPHOS 95 09/26/2019    ALKPHOS 104 10/27/2017    PROT 6 9 10/27/2017    BILITOT 0 3 10/27/2017    EGFR 74 09/26/2019   ,     Coags:   Lab Results   Component Value Date    PTT 69 (H) 09/19/2019    INR 2 10 (H) 09/20/2019   ,   Results from last 7 days   Lab Units 09/20/19  1742 09/19/19  1711   PTT seconds  --  69*   INR  2 10* 3 29*        Lipid Panel:   Lab Results   Component Value Date    CHOL 183 10/27/2017     Lab Results   Component Value Date    HDL 59 11/01/2018     Lab Results   Component Value Date    HDL 59 11/01/2018     Lab Results Component Value Date    LDLCALC 106 (H) 10/27/2017     Lab Results   Component Value Date    TRIG 88 11/01/2018       HgbA1c:   Lab Results   Component Value Date    HGBA1C 6 2 08/28/2019    HGBA1C 5 5 11/01/2018    HGBA1C 5 6 03/12/2018       Blood Culture:   Lab Results   Component Value Date    BLOODCX No Growth at 24 hrs  09/24/2019   ,   Urinalysis:   Lab Results   Component Value Date    COLORU Yellow 09/19/2019    COLORU Yellow 10/23/2017    CLARITYU Cloudy 09/19/2019    CLARITYU Transparent 10/23/2017    SPECGRAV 1 007 09/19/2019    SPECGRAV 1 015 10/23/2017    PHUR 7 5 09/19/2019    PHUR 5 10/23/2017    LEUKOCYTESUR Small (A) 09/19/2019    LEUKOCYTESUR TRACE 10/23/2017    NITRITE Negative 09/19/2019    NITRITE NEG 10/23/2017    PROTEINUA NEG 10/23/2017    GLUCOSEU Negative 09/19/2019    KETONESU Negative 09/19/2019    KETONESU NEG 10/23/2017    BILIRUBINUR Negative 09/19/2019    BILIRUBINUR NEG 10/23/2017    BLOODU Large (A) 09/19/2019    BLOODU POSITIVE 10/23/2017   ,   Urine Culture:   Lab Results   Component Value Date    URINECX (A) 09/19/2019     60,000-69,000 cfu/ml Coagulase negative Staphylococcus species    URINECX 60,000-69,000 cfu/ml Corynebacterium jeikeium (A) 09/19/2019   ,   Wound Culure:  No results found for: WOUNDCULT    VTE Pharmacologic Prophylaxis: Enoxaparin (Lovenox)      Thank you for allowing me to participate in the care of [address]  Please don't hesitate to call, text, email, or TigerText with any questions  This text is generated with voice recognition software  There may be translation, syntax,  or grammatical errors  If you have any questions, please contact the dictating provider      Win Edwards PA-C

## 2019-09-26 NOTE — SOCIAL WORK
A post acute care recommendation was made by your care team for home IV ATB  Discussed Freedom of Choice with patient  Patient states she has had [facility] in the past and would like to resume care with them  Referral entered in [address] [place] [address] for IV antibiotic, she would like [facility], referral entered in [facility]    PT recommended walker, spoke with pt, states she uses her mothers and they share  Offered to obtain one for her and provided DME providers available  She would like [facility] provider    Referral in [facility] to [facility] for delivery to room,

## 2019-09-26 NOTE — NURSING NOTE
Pt AM Hgb 6 8  Rob Bermudez PAC DAVID ordered 1unit PRBCs  On collecting the type and screen, a H&H was also collected revealing a Hgb of 7 5  Dr Ngoc Farmer notified & Georgia Velasquez on hold for right now   Will check 1pm H&H

## 2019-09-26 NOTE — PROGRESS NOTES
Gyn Oncology Progress note   Alejandro Lovetti 60 y o  female MRN: [id number]  Unit/Bed#: [facility] 912-01 Encounter: [id number]    Assessment: 60 y o  endometrial cancer recurrence, second-line chemotherapy, presented with sepsis    Plan:    1) Sepsis- MSSA of septicemia  - MSSA bacteremia setting of a port A catheter  - catheter removed on 9/20   - blood cultures positive on 9/20,9/21and negative since 9/22  - TTE within normal limits, no agitation appreciated  - CT chest showing right internal jugular septic thrombophlebitis, infectious disease recommendation 6 weeks antibiotic, Cefazolin day #5 and antibiotherapy day#8  - TRINH was not able to perform due to poor anatomy, has history of gastric bypass, EGD performed and negative for strictures, and new pathology  - Patient may get PICC line s/p negative blood cultures for 72 h     2) Cough, dyspnea and dyphagia    - Patient is on mechanical soft thin liquid diet, she is tolerating well  - Having less shortness of breath and no coughing during our conversation  - EGD was negative for strictures and esophageal pathology  - patient is on room air and SaO2 96%  - Lungs Clear to auscultate B/L     3) Acute kidney injury  - Status post septic shock, hypovolemia, creatinine levels elevated and ; last 72 hours return to baseline 1 9->1 6->0 9-> 0 6->0 54->0 85 today    4) Anemia  - hemoglobin 7 5->7 0->1 pRBC transfusion->8 1->7 5->7 3    5) Physical deconditioning  - physical therapy  - patient is able to ambulate short dripping cord or and able to go to bathroom    6) Constipation  - abdominal exam; soft no signs of acute abdomen, Colace were given    Subjective: Aliza Morris has no current complaints  Pain is well controlled with Tylenol  Patient is currently voiding  She is ambulating  Patient is currently passing flatus and has had no bowel movement   She is tolerating PO, soft liquid diet, yesterday she was NPO due to EGD and TRINH, and denies nausea or vomitting  Patient denies fever, chills, chest pain, shortness of breath, or calf tenderness  /78   Pulse 105   Temp 99 1 °F (37 3 °C) (Oral)   Resp 18   Wt 89 kg (196 lb 3 2 oz)   LMP  (LMP Unknown)   SpO2 95%   BMI 39 63 kg/m²     I/O last 3 completed shifts: In: 0 [P O :240; I V :350]  Out: 1300 [Urine:1300]  I/O this shift:  In: -   Out: 400 [Urine:400]    Lab Results   Component Value Date    WBC 17 15 (H) 09/25/2019    HGB 7 3 (L) 09/25/2019    HCT 23 1 (L) 09/25/2019    MCV 89 09/25/2019     09/25/2019       Lab Results   Component Value Date    GLUCOSE 219 (H) 12/19/2017    CALCIUM 8 2 (L) 09/26/2019     10/27/2017    K 4 7 09/26/2019    CO2 21 09/26/2019     09/26/2019    BUN 21 09/26/2019    CREATININE 0 85 09/26/2019           Physical Exam   Constitutional: She is oriented to person, place, and time  She appears well-developed and well-nourished  No distress  Cardiovascular: Normal rate and regular rhythm  Pulmonary/Chest: Effort normal    Abdominal: Soft  Musculoskeletal: Normal range of motion  Neurological: She is alert and oriented to person, place, and time  Skin: Skin is warm  She is not diaphoretic  Psychiatric: She has a normal mood and affect   Her behavior is normal          Yue Barrera MD  2/10/7220  5:36 AM

## 2019-09-26 NOTE — UTILIZATION REVIEW
Continued Stay Review    Date: 09/26/2019                          Current Patient Class: Inpatient  Current Level of Care: Med/Surg    HPI:64 y o  female initially admitted on 09/19/2019   patient with endometrial cancer currently treated with chemotherapy through a right chest wall Port-A-Cath, presenting for fever and severe sepsis found to have MSSA BSI, port infection and right IJ septic phlebitis and possible pulmonary septic emboli     Assessment/Plan: Progress Notes ID:  severe sepsis, POA:  With fever, hypotension that responded to IVF, [place],  supportive care and IVF,  continue antibiotics  Complicated MSSA bacteremia and Port-A-Cath infection:  Blood culture collected on admission on 09/19/2019 is positive for Staph aureus in 1/1 set; Bcx 09/20 and 09/21/19 remain positive; Bcx 09/23/19 negative to date  TTE negative  TRINH unsuccessful    CT chest showing right IJ septic thrombophlebitis  continue cefazolin; follow up result of Bcx 09/23/19   if Bcx from 09/23/19 remain negative in next 24h, can insert picc line  Patient will need 6 weeks of iv cefazolin, tentative end date  repeat CBC, close clinical follow-up and serial exams  As risk of TRINH is high because of possible esophageal pathology, and TTE is negative for vegetations,  can hold off on repeating TRINH  Patient will need 6 weeks of iv abx therapy due to septic thrombophlebitis and MSSA BSI  Tentative end date for abx 11/04/2019  right IJ septic thrombophlebitis:  CT chest showing filling defect over right IJ  Also patient had erythema over the right IJ vein on admission  Patient on [facility]   continue abx   continue close clinical monitoring        Pertinent Labs/Diagnostic Results:   Results from last 7 days   Lab Units 09/26/19  0858 09/26/19  0508 09/25/19  0514 09/24/19  0442 09/23/19  0532 09/22/19  0514  09/19/19  1711   WBC Thousand/uL  --  16 22* 17 15* 19 10* 18 65* 18 48*   < > 16 15*   HEMOGLOBIN g/dL 7 5* 6 8* 7 3* 7 5* 8 1* 7 0*   < > 7 5*   HEMATOCRIT % 23 6* 20 8* 23 1* 22 7* 23 9* 20 1*   < > 22 4*   PLATELETS Thousands/uL  --  205 250 223 177 104*   < > 131*   NEUTROS ABS Thousands/µL  --   --  12 07* 14 03*  --  13 83*   < >  --    BANDS PCT %  --  3  --   --   --   --   --  25*    < > = values in this interval not displayed  Results from last 7 days   Lab Units 09/26/19  0508 09/25/19  0514 09/24/19  0442 09/23/19  0532 09/22/19  0530  09/20/19  0521   SODIUM mmol/L 130* 135* 132* 138 134*   < > 132*   POTASSIUM mmol/L 4 7 3 8 3 0* 3 0* 3 6   < > 2 8*   CHLORIDE mmol/L 101 102 100 105 104   < > 103   CO2 mmol/L 21 25 24 22 20*   < > 17*   ANION GAP mmol/L 8 8 8 11 10   < > 12   BUN mg/dL 21 14 11 12 14   < > 33*   CREATININE mg/dL 0 85 0 71 0 54* 0 62 0 66   < > 1 60*   EGFR ml/min/1 73sq m 74 92 101 97 95   < > 34   CALCIUM mg/dL 8 2* 8 8 8 6 9 2 9 3   < > 8 1*   MAGNESIUM mg/dL  --   --   --   --   --   --  1 5*    < > = values in this interval not displayed       Results from last 7 days   Lab Units 09/26/19  0508 09/19/19  1711   AST U/L 36 55*   ALT U/L 25 41   ALK PHOS U/L 95 91   TOTAL PROTEIN g/dL 6 3* 6 8   ALBUMIN g/dL 1 9* 2 9*   TOTAL BILIRUBIN mg/dL 0 32 0 27     Results from last 7 days   Lab Units 09/26/19  0508 09/25/19  0514 09/24/19  0442 09/23/19  0532 09/22/19  0530 09/21/19  0535 09/20/19  0521 09/20/19  0515 09/19/19  1711   GLUCOSE RANDOM mg/dL 88 89 94 105 110 94 98 99 113     Results from last 7 days   Lab Units 09/20/19  1742 09/19/19  1711   PROTIME seconds 23 1* 32 9*   INR  2 10* 3 29*   PTT seconds  --  69*     Results from last 7 days   Lab Units 09/22/19  0530 09/20/19  0515 09/19/19  1711   PROCALCITONIN ng/ml 0 92* 4 97* 4 39*     Results from last 7 days   Lab Units 09/19/19  1711   LACTIC ACID mmol/L 0 7     Results from last 7 days   Lab Units 09/19/19  1731   CLARITY UA  Cloudy   COLOR UA  Yellow   SPEC GRAV UA  1 007   PH UA  7 5   GLUCOSE UA mg/dl Negative   KETONES UA mg/dl Negative   BLOOD UA Large*   PROTEIN UA mg/dl 100 (2+)*   NITRITE UA  Negative   BILIRUBIN UA  Negative   UROBILINOGEN UA E U /dl 0 2   LEUKOCYTES UA  Small*   WBC UA /hpf 10-20*   RBC UA /hpf 20-30*   BACTERIA UA /hpf Moderate*   EPITHELIAL CELLS WET PREP /hpf Occasional     Results from last 7 days   Lab Units 09/24/19  1447 09/24/19  1433 09/23/19  1353 09/23/19  1338 09/21/19  0536 09/21/19  0535 09/20/19  1058 09/20/19  1057 09/19/19  1731 09/19/19  1711   BLOOD CULTURE  No Growth at 24 hrs  No Growth at 24 hrs  No Growth at 48 hrs  No Growth at 48 hrs   Staphylococcus aureus* Staphylococcus aureus* Staphylococcus aureus* Staphylococcus aureus*  --  Staphylococcus aureus*   GRAM STAIN RESULT   --   --   --   --   --  Gram positive cocci in clusters* Gram positive cocci in clusters* Gram positive cocci in clusters*  --  Gram positive cocci in clusters*   URINE CULTURE   --   --   --   --   --   --   --   --  60,000-69,000 cfu/ml Coagulase negative Staphylococcus species*  60,000-69,000 cfu/ml Corynebacterium jeikeium*  --      Results from last 7 days   Lab Units 09/26/19  0508   TOTAL COUNTED  100     Vital Signs: /73   Pulse 87   Temp 98 4 °F (36 9 °C)   Resp 18   Wt 89 5 kg (197 lb 6 4 oz)   LMP  (LMP Unknown)   SpO2 99%   BMI 39 87 kg/m²     Medications:   Scheduled Meds:   Current Facility-Administered Medications:  acetaminophen 650 mg Oral Q6H PRN   ARIPiprazole 10 mg Oral Daily   aspirin 81 mg Oral Daily   benzonatate 200 mg Oral TID PRN   cefazolin 2,000 mg Intravenous Q8H   cholecalciferol 1,000 Units Oral Daily   docusate sodium 100 mg Oral Daily PRN   enoxaparin 1 mg/kg Subcutaneous Q12H CHRITSIANO   LORazepam 1 mg Oral Q6H PRN   mupirocin  Topical TID   oxybutynin 10 mg Oral Daily   pantoprazole 40 mg Intravenous Q24H CHRISTIANO   polyethylene glycol 17 g Oral Daily   potassium chloride 40 mEq Oral BID   sodium chloride (PF) 10 mL Intracatheter Daily   venlafaxine 75 mg Oral TID       Discharge Plan: TBD    Network Utilization Review Department  Phone: [phone]; Fax [phone]  [email or web address]  ATTENTION: Please call with any questions or concerns to [phone]  and carefully listen to the prompts so that you are directed to the right person  Send all requests for admission clinical reviews, approved or denied determinations and any other requests to fax [phone]   All voicemails are confidential

## 2019-09-26 NOTE — ASSESSMENT & PLAN NOTE
· Eliquis on hold for  since NPO placed her on lovenox BID  · Given finding of RIJ thrombossi in CT scan with nodules in lungs, suspect septic thrombi with pulm emboli  · D/w GYN Onc team - switched to Lovenox BID permanently

## 2019-09-26 NOTE — NURSING NOTE
Pt c/o constipation  Got colace ordered for her  Bed alarm on    Pt aware to call if she needs anything

## 2019-09-26 NOTE — PLAN OF CARE
Problem: Potential for Falls  Goal: Patient will remain free of falls  Description  INTERVENTIONS:  - Assess patient frequently for physical needs  -  Identify cognitive and physical deficits and behaviors that affect risk of falls  -  [place] fall precautions as indicated by assessment   - Educate patient/family on patient safety including physical limitations  - Instruct patient to call for assistance with activity based on assessment  - Modify environment to reduce risk of injury  - Consider OT/PT consult to assist with strengthening/mobility  Outcome: Progressing     Problem: Nutrition/Hydration-ADULT  Goal: Nutrient/Hydration intake appropriate for improving, restoring or maintaining nutritional needs  Description  Monitor and assess patient's nutrition/hydration status for malnutrition  Collaborate with interdisciplinary team and initiate plan and interventions as ordered  Monitor patient's weight and dietary intake as ordered or per policy  Utilize nutrition screening tool and intervene as necessary  Determine patient's food preferences and provide high-protein, high-caloric foods as appropriate       INTERVENTIONS:  - Monitor oral intake, urinary output, labs, and treatment plans  - Assess nutrition and hydration status and recommend course of action  - Evaluate amount of meals eaten  - Assist patient with eating if necessary   - Allow adequate time for meals  - Recommend/ encourage appropriate diets, oral nutritional supplements, and vitamin/mineral supplements  - Order, calculate, and assess calorie counts as needed  - Recommend, monitor, and adjust tube feedings and TPN/PPN based on assessed needs  - Assess need for intravenous fluids  - Provide specific nutrition/hydration education as appropriate  - Include patient/family/caregiver in decisions related to nutrition  Outcome: Progressing     Problem: INFECTION - ADULT  Goal: Absence or prevention of progression during hospitalization  Description  INTERVENTIONS:  - Assess and monitor for signs and symptoms of infection  - Monitor lab/diagnostic results  - Monitor all insertion sites, i e  indwelling lines, tubes, and drains  - Monitor endotracheal if appropriate and nasal secretions for changes in amount and color  - [place] appropriate cooling/warming therapies per order  - Administer medications as ordered  - Instruct and encourage patient and family to use good hand hygiene technique  - Identify and instruct in appropriate isolation precautions for identified infection/condition  Outcome: Progressing     Problem: SAFETY ADULT  Goal: Maintain or return to baseline ADL function  Description  INTERVENTIONS:  -  Assess patient's ability to carry out ADLs; assess patient's baseline for ADL function and identify physical deficits which impact ability to perform ADLs (bathing, care of mouth/teeth, toileting, grooming, dressing, etc )  - Assess/evaluate cause of self-care deficits   - Assess range of motion  - Assess patient's mobility; develop plan if impaired  - Assess patient's need for assistive devices and provide as appropriate  - Encourage maximum independence but intervene and supervise when necessary  - Involve family in performance of ADLs  - Assess for home care needs following discharge   - Consider OT consult to assist with ADL evaluation and planning for discharge  - Provide patient education as appropriate  Outcome: Progressing  Goal: Maintain or return mobility status to optimal level  Description  INTERVENTIONS:  - Assess patient's baseline mobility status (ambulation, transfers, stairs, etc )    - Identify cognitive and physical deficits and behaviors that affect mobility  - Identify mobility aids required to assist with transfers and/or ambulation (gait belt, sit-to-stand, lift, walker, cane, etc )  - [place] fall precautions as indicated by assessment  - Record patient progress and toleration of activity level on Mobility SBAR; progress patient to next Phase/Stage  - Instruct patient to call for assistance with activity based on assessment  - Consider rehabilitation consult to assist with strengthening/weightbearing, etc   Outcome: Progressing     Problem: DISCHARGE PLANNING  Goal: Discharge to home or other facility with appropriate resources  Description  INTERVENTIONS:  - Identify barriers to discharge w/patient and caregiver  - Arrange for needed discharge resources and transportation as appropriate  - Identify discharge learning needs (meds, wound care, etc )  - Arrange for interpretive services to assist at discharge as needed  - Refer to Case Management Department for coordinating discharge planning if the patient needs post-hospital services based on physician/advanced practitioner order or complex needs related to functional status, cognitive ability, or social support system  Outcome: Progressing     Problem: Prexisting or High Potential for Compromised Skin Integrity  Goal: Skin integrity is maintained or improved  Description  INTERVENTIONS:  - Identify patients at risk for skin breakdown  - Assess and monitor skin integrity  - Assess and monitor nutrition and hydration status  - Monitor labs   - Assess for incontinence   - Turn and reposition patient  - Assist with mobility/ambulation  - Relieve pressure over bony prominences  - Avoid friction and shearing  - Provide appropriate hygiene as needed including keeping skin clean and dry  - Evaluate need for skin moisturizer/barrier cream  - Collaborate with interdisciplinary team   - Patient/family teaching  - Consider wound care consult   Outcome: Progressing

## 2019-09-26 NOTE — PROGRESS NOTES
Progress Note - Eliecer Hernandez Pulcini 1955, 60 y o  female MRN: [id number]    Unit/Bed#: [facility] 912-01 Encounter: [id number]    Primary Care Provider: Shawna Shields DO   Date and time admitted to hospital: 9/19/2019  4:09 PM        Cough  Assessment & Plan  · Kept NPO, speech evaled - rec pureed but pt denying hence place on full liquid  · Speech and swallow evaluation for dysphagia, stricture  · Cough could be from mild ongoing aspiration vs suspected septic emboli  · On RA    Acute respiratory failure (HCC)  Assessment & Plan  · Evidence upon admission with tachypnea, dyspnea, hypoxia after IV fluid resuscitation for sepsis  · s/p 20 mg IV Lasix on admission  · Improved now  · Weaning off O2 - now on RA  · Hold off on further diuresis - use Prn  · CT chest has R>L effusion    MSSA bacteremia  Assessment & Plan  · MSSA bacteremia  · Repeat BC 9/20 positive, BC from 9/21 positive, BC from 9/23 neg for 48 hrs, will be able to put picc line when 72 hrs neg  · Port removed - pocket looked infected, this is complicated line infection  · IV Vanco changed to IV ancef  · She will need 6 weeks total of antibiotics  · Check TTE negative, TRINH could not be done due to poor anatomy, she has EGD negative for findings      Hyponatremia  Assessment & Plan  · Sodium 129 on admission  · Hypovolemic hypernatremia  · Status is IV fluids followed by Lasix  · Now 134    Hypokalemia  Assessment & Plan  · K 2 9  · After repletion, now 3 2, replete today    Volume overload  Assessment & Plan  · Suspected by the admitting physician at the time of evaluation due to dyspnea, tachypnea, hypoxia  · Received 3 L of fluid for sepsis followed by 20 mg of IV Lasix  · No longer appears volume overload, mild tachypnea - hold IVF  · Hold off on further diuresis - use prn    Anemia due to chemotherapy  Assessment & Plan  · Hemoglobin 7 5 -> 7 0 -> 7 0  · transfused 1 U PRBC - now 8 0    Acute deep vein thrombosis (DVT) of proximal vein of lower extremity ([facility] [address] )  Assessment & Plan  · Eliquis on hold for  since NPO placed her on lovenox BID  · Given finding of RIJ thrombossi in CT scan with nodules in lungs, suspect septic thrombi with pulm emboli  · D/w GYN Onc team - switched to Lovenox BID permanently    Endometrial cancer [facility])  Assessment & Plan  · Appreciate Gynecologic Oncology evaluation, she received Taxol/carboplatin and received last chemotherapy 09/09/2019  · Received granix 08/31/2019, also Neulasta 09/09/2019 and 09/16/2019  · Chemotherapy to be on hold for now given acute infection  · CT CAP - shows some improvement in areas of malignancy, R>L pleural effusion, nodules in lung which can be infectious/inflammatory    Benign essential hypertension  Assessment & Plan  · Holding quinapril for now given acute kidney injury & normal BPs  · Blood pressure monitoring per protocol    * Sepsis ([facility] )  Assessment & Plan  · POA with fevers, leukocytosis, tachycardia  Elevated procalcitonin on admission however with normal lactic acid level  · 2/2 MSSA bacteremia  · Vanco changed to IV Ancef  · Port a cath removed by IR - pocket was infected  · TTE performed - negative  · Repeat BC 9/20 are positive, BC 9/21 again coming back positive  · TRINH could not be done due to anatomy  EGD did not show any esophageal abnormalities           VTE Pharmacologic Prophylaxis:   Pharmacologic: Enoxaparin (Lovenox)  Mechanical VTE Prophylaxis in Place: Yes    Patient Centered Rounds: I have performed bedside rounds with nursing staff today  Discussions with Specialists or Other Care Team Provider: ID    Education and Discussions with Family / Patient: patient     Time Spent for Care: 45 minutes  More than 50% of total time spent on counseling and coordination of care as described above      Current Length of Stay: 7 day(s)    Current Patient Status: Inpatient   Certification Statement: The patient will continue to require additional inpatient hospital stay due to need a PICC line tomorrow, she will need antibiotics for 6 weeks set up, possible rehab  Discharge Plan: possible to rehab    Code Status: Level 1 - Full Code      Subjective:   Doing well  Has no issues with patient  She denied cough  Objective:     Vitals:   Temp (24hrs), Av 6 °F (37 °C), Min:98 4 °F (36 9 °C), Max:99 1 °F (37 3 °C)    Temp:  [98 4 °F (36 9 °C)-99 1 °F (37 3 °C)] 98 4 °F (36 9 °C)  HR:  [] 87  Resp:  [18-20] 20  BP: (121-135)/(72-78) 123/72  SpO2:  [95 %-99 %] 96 %  Body mass index is 39 87 kg/m²  Input and Output Summary (last 24 hours): Intake/Output Summary (Last 24 hours) at 2019 1645  Last data filed at 2019 1330  Gross per 24 hour   Intake 100 ml   Output 600 ml   Net -500 ml       Physical Exam:     Physical Exam    Additional Data:     Labs:    Results from last 7 days   Lab Units 19  0858 19  0508 19  0514   WBC Thousand/uL  --  16 22* 17 15*   HEMOGLOBIN g/dL 7 5* 6 8* 7 3*   HEMATOCRIT % 23 6* 20 8* 23 1*   PLATELETS Thousands/uL  --  205 250   NEUTROS PCT %  --   --  70   LYMPHS PCT %  --   --  9*   LYMPHO PCT %  --  5*  --    MONOS PCT %  --   --  10   MONO PCT %  --  8  --    EOS PCT %  --  0 0     Results from last 7 days   Lab Units 19  1325 19  0508   POTASSIUM mmol/L 4 4 4 7   CHLORIDE mmol/L 101 101   CO2 mmol/L 23 21   BUN mg/dL 21 21   CREATININE mg/dL 0 90 0 85   CALCIUM mg/dL 8 6 8 2*   ALK PHOS U/L  --  95   ALT U/L  --  25   AST U/L  --  36     Results from last 7 days   Lab Units 19  1742   INR  2 10*       * I Have Reviewed All Lab Data Listed Above  * Additional Pertinent Lab Tests Reviewed:  [address] Admission Reviewed    Imaging:    Imaging Reports Reviewed Today Include:    Imaging Personally Reviewed by Myself Includes     Recent Cultures (last 7 days):     Results from last 7 days   Lab Units 19  1447 19  1433 19  1353 19  1338 19  0536 09/21/19  0535 09/20/19  1058 09/20/19  1057 09/19/19  1731 09/19/19  1711   BLOOD CULTURE  No Growth at 24 hrs  No Growth at 24 hrs  No Growth at 48 hrs  No Growth at 48 hrs  Staphylococcus aureus* Staphylococcus aureus* Staphylococcus aureus* Staphylococcus aureus*  --  Staphylococcus aureus*   GRAM STAIN RESULT   --   --   --   --   --  Gram positive cocci in clusters* Gram positive cocci in clusters* Gram positive cocci in clusters*  --  Gram positive cocci in clusters*   URINE CULTURE   --   --   --   --   --   --   --   --  60,000-69,000 cfu/ml Coagulase negative Staphylococcus species*  60,000-69,000 cfu/ml Corynebacterium jeikeium*  --        Last 24 Hours Medication List:     Current Facility-Administered Medications:  acetaminophen 650 mg Oral Q6H PRN Emperatriz Rosas PA-C    ARIPiprazole 10 mg Oral Daily Sung Lis, DO    aspirin 81 mg Oral Daily Sung Lis, DO    benzonatate 200 mg Oral TID PRN Emperatriz Rosas PA-C    cefazolin 2,000 mg Intravenous Q8H Jack Richardson MD Last Rate: 2,000 mg (09/26/19 1410)   cholecalciferol 1,000 Units Oral Daily Sung Lis, DO    docusate sodium 100 mg Oral Daily PRN Chad Smart PA-C    enoxaparin 1 mg/kg Subcutaneous Q12H [address] Oscar Martin MD    LORazepam 1 mg Oral Q6H PRN Luisito Waldrop MD    mupirocin  Topical TID Sung Lis, DO    oxybutynin 10 mg Oral Daily Sung Lis, DO    pantoprazole 40 mg Intravenous Q24H [address] Zo Burgos MD    polyethylene glycol 17 g Oral Daily Bryant G Chirayath, DO    potassium chloride 40 mEq Oral BID Luisito Waldrop MD    sodium chloride (PF) 10 mL Intracatheter Daily Sung Lis, DO    venlafaxine 75 mg Oral TID Sung Lis, DO         Today, Patient Was Seen By: Luisito Waldrop MD    ** Please Note: Dictation voice to text software may have been used in the creation of this document   **

## 2019-09-27 ENCOUNTER — APPOINTMENT (INPATIENT)
Dept: RADIOLOGY | Facility: HOSPITAL | Age: 64
DRG: 314 | End: 2019-09-27
Attending: INTERNAL MEDICINE
Payer: COMMERCIAL

## 2019-09-27 ENCOUNTER — HOSPITAL ENCOUNTER (OUTPATIENT)
Dept: INFUSION CENTER | Facility: CLINIC | Age: 64
End: 2019-09-27

## 2019-09-27 PROBLEM — E87.70 VOLUME OVERLOAD: Status: RESOLVED | Noted: 2019-09-19 | Resolved: 2019-09-27

## 2019-09-27 PROBLEM — E87.6 HYPOKALEMIA: Status: RESOLVED | Noted: 2019-09-19 | Resolved: 2019-09-27

## 2019-09-27 LAB — BACTERIA BLD CULT: ABNORMAL

## 2019-09-27 PROCEDURE — C1751 CATH, INF, PER/CENT/MIDLINE: HCPCS

## 2019-09-27 PROCEDURE — 36573 INSJ PICC RS&I 5 YR+: CPT

## 2019-09-27 PROCEDURE — 02HV33Z INSERTION OF INFUSION DEVICE INTO SUPERIOR VENA CAVA, PERCUTANEOUS APPROACH: ICD-10-PCS | Performed by: INTERNAL MEDICINE

## 2019-09-27 PROCEDURE — 76937 US GUIDE VASCULAR ACCESS: CPT

## 2019-09-27 PROCEDURE — 36569 INSJ PICC 5 YR+ W/O IMAGING: CPT

## 2019-09-27 PROCEDURE — NC001 PR NO CHARGE: Performed by: RADIOLOGY

## 2019-09-27 PROCEDURE — 99233 SBSQ HOSP IP/OBS HIGH 50: CPT | Performed by: INTERNAL MEDICINE

## 2019-09-27 PROCEDURE — C9113 INJ PANTOPRAZOLE SODIUM, VIA: HCPCS | Performed by: HOSPITALIST

## 2019-09-27 PROCEDURE — 99232 SBSQ HOSP IP/OBS MODERATE 35: CPT | Performed by: INTERNAL MEDICINE

## 2019-09-27 PROCEDURE — 99232 SBSQ HOSP IP/OBS MODERATE 35: CPT | Performed by: OBSTETRICS & GYNECOLOGY

## 2019-09-27 RX ADMIN — POTASSIUM CHLORIDE 40 MEQ: 1500 TABLET, EXTENDED RELEASE ORAL at 17:19

## 2019-09-27 RX ADMIN — MUPIROCIN: 20 OINTMENT TOPICAL at 17:19

## 2019-09-27 RX ADMIN — ENOXAPARIN SODIUM 90 MG: 100 INJECTION SUBCUTANEOUS at 17:19

## 2019-09-27 RX ADMIN — ENOXAPARIN SODIUM 90 MG: 100 INJECTION SUBCUTANEOUS at 06:02

## 2019-09-27 RX ADMIN — OXYBUTYNIN CHLORIDE 10 MG: 5 TABLET, EXTENDED RELEASE ORAL at 09:05

## 2019-09-27 RX ADMIN — MUPIROCIN: 20 OINTMENT TOPICAL at 09:06

## 2019-09-27 RX ADMIN — CEFAZOLIN SODIUM 2000 MG: 2 SOLUTION INTRAVENOUS at 14:42

## 2019-09-27 RX ADMIN — VENLAFAXINE 75 MG: 37.5 TABLET ORAL at 17:19

## 2019-09-27 RX ADMIN — VITAMIN D, TAB 1000IU (100/BT) 1000 UNITS: 25 TAB at 09:05

## 2019-09-27 RX ADMIN — SODIUM CHLORIDE 10 ML: 9 INJECTION, SOLUTION INTRAMUSCULAR; INTRAVENOUS; SUBCUTANEOUS at 09:06

## 2019-09-27 RX ADMIN — VENLAFAXINE 75 MG: 37.5 TABLET ORAL at 09:08

## 2019-09-27 RX ADMIN — MUPIROCIN: 20 OINTMENT TOPICAL at 21:37

## 2019-09-27 RX ADMIN — ARIPIPRAZOLE 10 MG: 10 TABLET ORAL at 09:06

## 2019-09-27 RX ADMIN — PANTOPRAZOLE SODIUM 40 MG: 40 INJECTION, POWDER, FOR SOLUTION INTRAVENOUS at 09:05

## 2019-09-27 RX ADMIN — POTASSIUM CHLORIDE 40 MEQ: 1500 TABLET, EXTENDED RELEASE ORAL at 09:05

## 2019-09-27 RX ADMIN — CEFAZOLIN SODIUM 2000 MG: 2 SOLUTION INTRAVENOUS at 06:02

## 2019-09-27 RX ADMIN — VENLAFAXINE 75 MG: 37.5 TABLET ORAL at 21:37

## 2019-09-27 RX ADMIN — ACETAMINOPHEN 650 MG: 325 TABLET ORAL at 19:57

## 2019-09-27 RX ADMIN — ASPIRIN 81 MG: 81 TABLET, COATED ORAL at 09:05

## 2019-09-27 RX ADMIN — CEFAZOLIN SODIUM 2000 MG: 2 SOLUTION INTRAVENOUS at 22:11

## 2019-09-27 NOTE — SOCIAL WORK
Updated by Dr Olivia Reynoso, pt was unable to get bedside PICC in & has an IR consult for placement & should be able to dc tomorrow still for 4pm  VNA visit

## 2019-09-27 NOTE — PROGRESS NOTES
Gyn Oncology Progress note   Iva Melendez 60 y o  female MRN: [id number]  Unit/Bed#: [facility] 912-01 Encounter: [id number]    Assessment: 60 y o  on second-line chemotherapy for recurrent endometrial cancer, presents with MSSA sepsis    Plan:    1) Sepsis-MSSA  - MSSA bacteremia setting of Port A catheter  - Catheter removed on 09/20  - blood cultures positive on 9/20-9/21, negative since 9/22  - TTE no signs of vegatations  - CC chest showing right internal jugular septic thrombophlebitis, infection Disease recommendations 6 weeks antibiotic, cefazolin day# 6 and antibiotic therapy day # 8  - TRINH was not able to perform due to poor anatomy, she does has history of gastric bypass, EGD performed and negative for strictures and no new pathology  - Blood cultures negative for last 72 hours    2) Cough, dysphagia, this pain and  - patient is on mechanical soft thin liquid diet, she is tolerating liquids and yesterday she had soft solid foods, declined nausea vomiting  - PT/OT recommendation appreciated  - Pt is on room air  - Lungs clear to auscultate bilaterally    3) Acute kidney injury  - Status post septic shock, hypovolemia, creatinine levels elevated and return to baseline 1 9->1 6->0 9->0 6->0 54->0 85->0 9    4) Anemia  - Hemoglobin 7 5-> 7 0-> 1 pRBC transfusion-> 8 1->7 5->7 3-> 1pRBC transfusion-> AM CBC pending    5) Physical deconditioning  - PT OT recommendation appreciated            Subjective: Shirley Rodriguez has no current complaints  Pain is well controlled with Tylenol  Patient is currently voiding  She is ambulating  Patient is currently passing flatus and has had no bowel movement  She is tolerating PO, and denies nausea or vomitting  Patient denies fever, chills, chest pain, shortness of breath, or calf tenderness       /70   Pulse 102   Temp 99 °F (37 2 °C)   Resp 22   Wt 89 5 kg (197 lb 6 4 oz)   LMP  (LMP Unknown)   SpO2 95%   BMI 39 87 kg/m²     I/O last 3 completed shifts: In: 200 [P O :100; I V :100]  Out: 875 [Urine:875]  I/O this shift: In: 48 [IV Piggyback:50]  Out: 140 [Urine:140]    Lab Results   Component Value Date    WBC 17 24 (H) 09/26/2019    HGB 7 3 (L) 09/26/2019    HCT 22 5 (L) 09/26/2019    MCV 88 09/26/2019     09/26/2019       Lab Results   Component Value Date    GLUCOSE 219 (H) 12/19/2017    CALCIUM 8 6 09/26/2019     10/27/2017    K 4 4 09/26/2019    CO2 23 09/26/2019     09/26/2019    BUN 21 09/26/2019    CREATININE 0 90 09/26/2019           Physical Exam   Constitutional: She is oriented to person, place, and time  She appears well-developed and well-nourished  No distress  Cardiovascular: Normal rate, regular rhythm and normal heart sounds  Pulmonary/Chest: Effort normal    Abdominal: Soft  Musculoskeletal: Normal range of motion  Neurological: She is alert and oriented to person, place, and time  Skin: Skin is warm and dry  She is not diaphoretic  Psychiatric: She has a normal mood and affect   Her behavior is normal          Celia Sheehan MD  1/91/2561  9:37 AM

## 2019-09-27 NOTE — DISCHARGE INSTRUCTIONS
Peripherally Inserted Central Catheter     WHAT YOU NEED TO KNOW:   A PICC is an IV placed into a large blood vessel near your heart  It is usually inserted through a blood vessel in your arm  Your PICC may have multiple ports  Ports are tubes where you can inject medicine  A PICC can stay in place for several weeks or months  You may need a PICC to get nutrition, medicine, or fluids  Blood samples can be removed from your PICC and sent to the lab for tests  DISCHARGE INSTRUCTIONS:    [address] Solid patients,    Contact Interventional Radiology at [phone] PATIENTS: Contact Interventional Radiology at [phone]   [facility] PATIENTS: Contact Interventional Radiology at [phone] if:  · Blood soaks through your bandage  · Your arm or leg feels warm, tender, and painful  It may look swollen and red  · You have trouble moving your arm  · Your catheter falls out  · You have a fever or swelling, redness, pain, or pus where the catheter was inserted  · Persistent nausea or vomiting  · You cannot flush your catheter, or you feel pain when you flush your catheter  · You see a hole or crack in the tubing of your catheter  · You see fluid leaking from the insertion site  · You run out of supplies to care for your catheter  · You have questions or concerns about your condition or care  Implanted Venous Access Port Removal    WHAT YOU NEED TO KNOW:   An implanted venous access port is a device used to give treatments and take blood  It may also be called a central venous access device (CVAD)  The port is a small container that is placed under your skin, usually in your upper chest  A port can also be placed in your arm or abdomen  The port is attached to a catheter that enters a large vein  DISCHARGE INSTRUCTIONS:   Resume your normal diet  Small sips of flat soda will help with mild nausea  Prevent an infection:     Wash your hands often    Use soap and water  Clean your hands before and  after you care for your incision  Check your skin for infection every day  Look for redness, swelling, or fluid oozing from the incision site  Dressing may come off in 24 hours  Medical glue will peel off on its own in 5 to 10 days  You may shower 24 hours after procedure  Follow up with your healthcare provider as directed  Write down your questions so you remember to ask them during your visits  Activity:  You may return to your daily activities when the area heals  Contact Interventional Radiology at [phone] Angelique PATIENTS: Contact Interventional Radiology at [phone]) Cash Garcia PATIENTS: Contact Interventional Radiology at [phone]) if:     You have a fever  You have persistent nausea  Your inciscion site is red, swollen, or draining pus  You have questions or concerns about your condition or care  Seek care immediately or call 911 if:  Blood soaks through your bandage  The skin over or around your incision breaks open  Your heart is jumping or fluttering  You have a headache, blurred vision, and feel confused  You have pain in your arm, neck, shoulder, or chest     You have trouble breathing that is getting worse over time

## 2019-09-27 NOTE — RESTORATIVE TECHNICIAN NOTE
Restorative Specialist Mobility Note       Activity: Ambulate in mehta, Stand at bedside, Ambulate in room, Dangle, Other (Comment)(Educated and encouraged Pt about activity  Returned to bed with call bell and tray within reach   Bed alarm active)     Assistive Device: Other (Comment)(HHA x 1) [employer]

## 2019-09-27 NOTE — SOCIAL WORK
Informed by [facility], Dr Miri Sawyer, pt will be ready for discharge tomorrow after am dose  S/w Jeri at [facility] ([facility] VNA whom stated will be seen tomorrow at 4pm for every 8 hr schedule of 8-4-12  Going home with vna for SN, PT & OT  RW was delivered to room  S/w Bouchra at [facility] ([facility] Infusion whom will deliver supplies to pt hospital room today  Pt getting PICC line today  Per SLIM, family will transport home tomorrow  Updated pt, RN & SLIM

## 2019-09-27 NOTE — PROCEDURES
Insert PICC line  Date/Time: 9/27/2019 11:00 AM  Performed by: Smith Horn RN  Authorized by: Cr Ledesma MD     Comments:      Attempted to place PICC Line in left arm   Veins close to Arteries and small  Unable to cannulate vein  Port just removed from right due to an infected port pocket

## 2019-09-27 NOTE — PROCEDURES
PICC Line Insertion  Date/Time: 9/27/2019 4:17 PM  Performed by: Mikayla Jorgensen MD  Authorized by: Naseem Lambert MD     Patient location:  IR  Consent:     Consent obtained:  Written    Consent given by:  Patient    Alternatives discussed:  Delayed treatment, alternative treatment and no treatment  Universal protocol:     Procedure explained and questions answered to patient or proxy's satisfaction: yes      Relevant documents present and verified: yes      Test results available and properly labeled: yes      Radiology Images displayed and confirmed  If images not available, report reviewed: yes      Required blood products, implants, devices, and special equipment available: yes      Site/side marked: yes      Immediately prior to procedure, a time out was called: yes      Patient identity confirmed:  Verbally with patient, arm band, provided demographic data and hospital-assigned identification number  Pre-procedure details:     Hand hygiene: Hand hygiene performed prior to insertion      Sterile barrier technique: All elements of maximal sterile technique followed      Skin preparation:  ChloraPrep    Skin preparation agent: Skin preparation agent completely dried prior to procedure    Indications:     PICC line indications: long term antibiotics    Anesthesia (see MAR for exact dosages):      Anesthesia method:  Local infiltration    Local anesthetic:  Lidocaine 1% w/o epi  Procedure details:     Location:  Brachial    Laterality:  Right    Patient position:  Flat    Procedural supplies:  Double lumen    Catheter size:  5 Fr    Landmarks identified: yes      Ultrasound guidance: yes      Sterile ultrasound techniques: Sterile gel and sterile probe covers were used      Number of attempts:  1    Successful placement: yes      Total catheter length (cm):  35    Catheter out on skin (cm):  0    Max flow rate:  999    Arm circumference:  40  Post-procedure details:     Post-procedure:  Securement device placed    Assessment:  Blood return through all ports and placement verified by x-ray    Post-procedure complications: none      Patient tolerance of procedure:   Tolerated well, no immediate complications

## 2019-09-27 NOTE — SPEECH THERAPY NOTE
Speech Language/Pathology  Attempted to see pt, pt for IR to receive PICC, does not want any po trials at this time  Will return as able to upgrade

## 2019-09-27 NOTE — PROGRESS NOTES
Progress Note - Sammi Cerda Pulcini 1955, 60 y o  female MRN: [id number]    Unit/Bed#: [facility] 912-01 Encounter: [id number]    Primary Care Provider: Mihaela Zapata DO   Date and time admitted to hospital: 9/19/2019  4:09 PM        Cough  Assessment & Plan  · Kept NPO, speech evaled - rec pureed but pt denying hence place on full liquid  · Speech and swallow evaluation for dysphagia, stricture  · Cough could be from mild ongoing aspiration vs suspected septic emboli  · On RA    Acute respiratory failure (HCC)  Assessment & Plan  · Evidence upon admission with tachypnea, dyspnea, hypoxia after IV fluid resuscitation for sepsis  · s/p 20 mg IV Lasix on admission  · Improved now  · Weaning off O2 - now on RA  · Hold off on further diuresis - use Prn  · CT chest has R>L effusion    MSSA bacteremia  Assessment & Plan  · MSSA bacteremia  · Repeat BC 9/20 positive, BC from 9/21 positive, BC from 9/23 neg for 48 hrs, will be able to put picc line when 72 hrs neg  · Port removed - pocket looked infected, this is complicated line infection  · IV Vanco changed to IV ancef  · She will need 6 weeks total of antibiotics  · Check TTE negative, TRINH could not be done due to poor anatomy, she has EGD negative for findings  Hyponatremia  Assessment & Plan  · Sodium 129 on admission now 131  · Hypovolemic hypernatremia  · Status is IV fluids followed by Lasix      Acute kidney injury ([facility] [address] )  Assessment & Plan  · POA, baseline approximately 0 6  · Suspect in setting of sepsis, poor p o   Intake  · Received 3 L NS in ED however unfortunately with evidence of volume overload also got some lasix  · S/p IVF  · Resolved to 0 6  · monitor post IV contrast  · Hold quinapril, renally dose medications otherwise    Acute deep vein thrombosis (DVT) of proximal vein of lower extremity (HCC)  Assessment & Plan  · Eliquis on hold for  since NPO placed her on lovenox BID  · Given finding of RIJ thrombossi in CT scan with nodules in lungs, suspect septic thrombi with pulm emboli  · D/w GYN Onc team - switched to Lovenox BID permanently    Endometrial cancer [facility])  Assessment & Plan  · Appreciate Gynecologic Oncology evaluation, she received Taxol/carboplatin and received last chemotherapy 09/09/2019  · Received granix 08/31/2019, also Neulasta 09/09/2019 and 09/16/2019  · Chemotherapy to be on hold for now given acute infection  · CT CAP - shows some improvement in areas of malignancy, R>L pleural effusion, nodules in lung which can be infectious/inflammatory    Benign essential hypertension  Assessment & Plan  · Holding quinapril for now given acute kidney injury & normal BPs  · Blood pressure monitoring per protocol    * Sepsis ([facility] [address] )  Assessment & Plan  · POA with fevers, leukocytosis, tachycardia  Elevated procalcitonin on admission however with normal lactic acid level  · 2/2 MSSA bacteremia  · Vanco changed to IV Ancef  · Port a cath removed by IR - pocket was infected  · TTE performed - negative  · Repeat BC 9/20 are positive, BC 9/21 again coming back positive  · TRINH could not be done due to anatomy  EGD did not show any esophageal abnormalities    Hypokalemiaresolved as of 9/27/2019  Assessment & Plan  · K 2 9  · After repletion, now 3 2, replete today    Volume overloadresolved as of 9/27/2019  Assessment & Plan  · Suspected by the admitting physician at the time of evaluation due to dyspnea, tachypnea, hypoxia  · Received 3 L of fluid for sepsis followed by 20 mg of IV Lasix  · No longer appears volume overload, mild tachypnea - hold IVF  · Hold off on further diuresis - use prn           VTE Pharmacologic Prophylaxis:   Pharmacologic: Enoxaparin (Lovenox)  Mechanical VTE Prophylaxis in Place: Yes    Patient Centered Rounds: I have performed bedside rounds with nursing staff today      Discussions with Specialists or Other Care Team Provider: oncology, picc team, CM    Education and Discussions with Family / Patient: patient     Time Spent for Care: 30 minutes  More than 50% of total time spent on counseling and coordination of care as described above  Current Length of Stay: 8 day(s)    Current Patient Status: Inpatient   Certification Statement: The patient will continue to require additional inpatient hospital stay due to need picc line placed    Discharge Plan: possible for discharge after morning dose of antibiotics  Code Status: Level 1 - Full Code      Subjective:   Doing well  Have no questions  Objective:     Vitals:   Temp (24hrs), Av 6 °F (37 °C), Min:98 1 °F (36 7 °C), Max:99 °F (37 2 °C)    Temp:  [98 1 °F (36 7 °C)-99 °F (37 2 °C)] 98 1 °F (36 7 °C)  HR:  [] 82  Resp:  [18-22] 20  BP: (121-139)/(70-76) 121/72  SpO2:  [95 %-99 %] 99 %  Body mass index is 40 8 kg/m²  Input and Output Summary (last 24 hours): Intake/Output Summary (Last 24 hours) at 2019 1627  Last data filed at 2019 1509  Gross per 24 hour   Intake 220 ml   Output 740 ml   Net -520 ml       Physical Exam:     Physical Exam   Constitutional: She is oriented to person, place, and time  She appears well-developed and well-nourished  No distress  HENT:   Head: Normocephalic and atraumatic  Right Ear: External ear normal    Left Ear: External ear normal    Nose: Nose normal    Mouth/Throat: Oropharynx is clear and moist    Eyes: Pupils are equal, round, and reactive to light  Conjunctivae are normal    Neck: Normal range of motion  Neck supple  No tracheal deviation present  No thyromegaly present  Cardiovascular: Normal rate, regular rhythm, normal heart sounds and intact distal pulses  Exam reveals no gallop and no friction rub  No murmur heard  Pulmonary/Chest: Breath sounds normal  No stridor  No respiratory distress  She has no wheezes  She has no rales  She exhibits no tenderness  Abdominal: Soft  Bowel sounds are normal  She exhibits distension  There is tenderness  Musculoskeletal: Normal range of motion   She exhibits no edema, tenderness or deformity  Neurological: She is alert and oriented to person, place, and time  She displays normal reflexes  No cranial nerve deficit or sensory deficit  Coordination normal    Skin: Skin is warm and dry  No rash noted  She is not diaphoretic  No erythema  No pallor  Psychiatric: She has a normal mood and affect  Nursing note and vitals reviewed  Additional Data:     Labs:    Results from last 7 days   Lab Units 09/26/19  1645   WBC Thousand/uL 17 24*   HEMOGLOBIN g/dL 7 3*   HEMATOCRIT % 22 5*   PLATELETS Thousands/uL 203   NEUTROS PCT % 71   LYMPHS PCT % 9*   MONOS PCT % 12   EOS PCT % 0     Results from last 7 days   Lab Units 09/26/19  1325 09/26/19  0508   POTASSIUM mmol/L 4 4 4 7   CHLORIDE mmol/L 101 101   CO2 mmol/L 23 21   BUN mg/dL 21 21   CREATININE mg/dL 0 90 0 85   CALCIUM mg/dL 8 6 8 2*   ALK PHOS U/L  --  95   ALT U/L  --  25   AST U/L  --  36     Results from last 7 days   Lab Units 09/20/19  1742   INR  2 10*       * I Have Reviewed All Lab Data Listed Above  * Additional Pertinent Lab Tests Reviewed: [address] Admission Reviewed    Imaging:    Imaging Reports Reviewed Today Include:    Imaging Personally Reviewed by Myself Includes:     Recent Cultures (last 7 days):     Results from last 7 days   Lab Units 09/24/19  1447 09/24/19  1433 09/23/19  1353 09/23/19  1338 09/21/19  0536 09/21/19  0535   BLOOD CULTURE  No Growth at 48 hrs  No Growth at 48 hrs  No Growth at 72 hrs  No Growth at 72 hrs   Staphylococcus aureus* Staphylococcus aureus*   GRAM STAIN RESULT   --   --   --   --   --  Gram positive cocci in clusters*       Last 24 Hours Medication List:     Current Facility-Administered Medications:  acetaminophen 650 mg Oral Q6H PRN Emperatriz E Alison PA-C    ARIPiprazole 10 mg Oral Daily Harvis Light, DO    aspirin 81 mg Oral Daily Harvis Light, DO    benzonatate 200 mg Oral TID PRN Emperatriz E Alison PA-C    cefazolin 2,000 mg Intravenous [facility] Kevin Mcallister MD Last Rate: Stopped (09/27/19 1502)   cholecalciferol 1,000 Units Oral Daily Bessy Bangura DO    docusate sodium 100 mg Oral Daily PRN Maggie Lee PA-C    enoxaparin 1 mg/kg Subcutaneous Q12H [address] Kaushik Reddy MD    LORazepam 1 mg Oral Q6H PRN Kaylen Roman MD    mupirocin  Topical TID Bessy Bangura DO    [START ON 9/28/2019] omeprazole (PRILOSEC) suspension 2 mg/mL 20 mg Oral Early Morning Kaylen Roman MD    oxybutynin 10 mg Oral Daily Bessy Bangura DO    polyethylene glycol 17 g Oral Daily Bryant G Chirayath,     potassium chloride 40 mEq Oral BID Kaylen Roman MD    sodium chloride (PF) 10 mL Intracatheter Daily Bessy Bangura DO    venlafaxine 75 mg Oral TID Bessy Bangura DO         Today, Patient Was Seen By: Kaylen Roman MD    ** Please Note: Dictation voice to text software may have been used in the creation of this document   **

## 2019-09-27 NOTE — ASSESSMENT & PLAN NOTE
· Sodium 129 on admission now 131  · Hypovolemic hypernatremia  · Status is IV fluids followed by Lasix

## 2019-09-27 NOTE — SOCIAL WORK
Home infusion meds were delivered to bedside  IV abx were placed in fridge by RN  Will need to go home with pt tomorrow

## 2019-09-27 NOTE — PROGRESS NOTES
Progress Note - Infectious Disease   [facility] 60 [facility]  [facility] MRN: [id number]  Unit/Bed#: [facility] 912-01 Encounter: [id number]      Impression/Plan:  82-year-old female patient with endometrial cancer currently treated with chemotherapy through a right chest wall Port-A-Cath, presenting for fever and severe sepsis found to have MSSA BSI, port infection and right IJ septic phlebitis and possible pulmonary septic emboli     1- severe sepsis, POA:  With fever, hypotension that responded to IVF, [place] on admission, now resolved    Sepsis is secondary to #2  - supportive care and IVF as per primary team  - continue antibiotics as below        2- Complicated MSSA bacteremia and Port-A-Cath infection:  Blood culture collected on admission on 09/19/2019 is positive for Staph aureus in 1/1 set; Bcx 09/20 and 09/21/19 remain positive; Bcx 09/23/19 negative to date  TTE negative  TRINH unsuccessful    CT chest showing right IJ septic thrombophlebitis     - continue cefazolin 2g iv q8h   - if Bcx from 09/23/19 remain negative, can insert picc line  Patient will need 6 weeks of iv cefazolin, tentative end date 11/04/19  - As risk of TRINH is high because of possible esophageal pathology, and TTE is negative for vegetations,  can hold off on repeating TRINH  Patient will need 6 weeks of iv abx therapy due to septic thrombophlebitis and MSSA BSI  ID follow up as OP, appointment entered in epic      3- right IJ septic thrombophlebitis:  CT chest showing filling defect over right IJ  Also patient had erythema over the right IJ vein on admission  Patient on [facility]  - continue abx as above     - continue close clinical monitoring     4- Endometrial cancer:  Last chemotherapy received 09/09/2019  - GYN-oncology follow-up        5- acute kidney injury POA:  Now resolved with Creat today 0 85 mg/dl  - repeat BMP  - avoid nephrotoxic meds     6- acute respiratory failure POA:   Chest x-ray showing vascular congestion  CT chest suggestive of possible septic emboli  Patient reports significant  improvement in her shortness of breath  - monitor clinically  - continue abx as above        Case discussed with primary team     Antibiotics:  Antibiotics day 9  cefazolin day 6      Subjective:  Patient has no fever, chills, sweats; no nausea, vomiting, diarrhea; no cough, shortness of breath; no pain  No new symptoms  Objective:  Vitals:  Temp:  [98 4 °F (36 9 °C)-99 °F (37 2 °C)] 98 6 °F (37 °C)  HR:  [] 100  Resp:  [18-22] 18  BP: (123-139)/(70-76) 138/76  SpO2:  [95 %-96 %] 96 %  Temp (24hrs), Av 7 °F (37 1 °C), Min:98 4 °F (36 9 °C), Max:99 °F (37 2 °C)  Current: Temperature: 98 6 °F (37 °C)    Physical Exam:   General Appearance:  Alert, interactive, nontoxic, no acute distress  Throat: Oropharynx moist without lesions  Lungs:   Clear to auscultation bilaterally; no wheezes, rhonchi or rales; respirations unlabored   Heart:  RRR; no murmur, rub or gallop   Abdomen:   Soft, non-tender, non-distended, positive bowel sounds  Extremities: No clubbing, cyanosis or edema   Skin: No new rashes or lesions  Stitches removed from right chest wall, at previous exit site of the infected port   No tenderness, no swelling, no purulence       Labs, Imaging, & Other studies:   All pertinent labs and imaging studies were personally reviewed  Results from last 7 days   Lab Units 19  1645 19  0858 19  0508 19  0514   WBC Thousand/uL 17 24*  --  16 22* 17 15*   HEMOGLOBIN g/dL 7 3* 7 5* 6 8* 7 3*   PLATELETS Thousands/uL 203  --  205 250     Results from last 7 days   Lab Units 19  1325 19  0508 19  0514   SODIUM mmol/L 131* 130* 135*   POTASSIUM mmol/L 4 4 4 7 3 8   CHLORIDE mmol/L 101 101 102   CO2 mmol/L 23 21 25   BUN mg/dL 21 21 14   CREATININE mg/dL 0 90 0 85 0 71   EGFR ml/min/1 73sq m 69 74 92   CALCIUM mg/dL 8 6 8 2* 8 8   AST U/L  --  36  --    ALT U/L  --  25  --    ALK PHOS U/L  --  95  -- Results from last 7 days   Lab Units 09/24/19  1447 09/24/19  1433 09/23/19  1353 09/23/19  1338 09/21/19  0536 09/21/19  0535   BLOOD CULTURE  No Growth at 48 hrs  No Growth at 48 hrs  No Growth at 72 hrs  No Growth at 72 hrs   Staphylococcus aureus* Staphylococcus aureus*   GRAM STAIN RESULT   --   --   --   --   --  Gram positive cocci in clusters*     Results from last 7 days   Lab Units 09/22/19  0530   PROCALCITONIN ng/ml 0 92*

## 2019-09-28 LAB
ANION GAP SERPL CALCULATED.3IONS-SCNC: 7 MMOL/L (ref 4–13)
BACTERIA BLD CULT: NORMAL
BACTERIA BLD CULT: NORMAL
BASOPHILS # BLD AUTO: 0.06 THOUSANDS/ΜL (ref 0–0.1)
BASOPHILS # BLD AUTO: 0.06 THOUSANDS/ΜL (ref 0–0.1)
BASOPHILS NFR BLD AUTO: 0 % (ref 0–1)
BASOPHILS NFR BLD AUTO: 1 % (ref 0–1)
BUN SERPL-MCNC: 25 MG/DL (ref 5–25)
CALCIUM SERPL-MCNC: 8.9 MG/DL (ref 8.3–10.1)
CHLORIDE SERPL-SCNC: 107 MMOL/L (ref 100–108)
CO2 SERPL-SCNC: 19 MMOL/L (ref 21–32)
CREAT SERPL-MCNC: 0.82 MG/DL (ref 0.6–1.3)
EOSINOPHIL # BLD AUTO: 0.02 THOUSAND/ΜL (ref 0–0.61)
EOSINOPHIL # BLD AUTO: 0.02 THOUSAND/ΜL (ref 0–0.61)
EOSINOPHIL NFR BLD AUTO: 0 % (ref 0–6)
EOSINOPHIL NFR BLD AUTO: 0 % (ref 0–6)
ERYTHROCYTE [DISTWIDTH] IN BLOOD BY AUTOMATED COUNT: 16.8 % (ref 11.6–15.1)
ERYTHROCYTE [DISTWIDTH] IN BLOOD BY AUTOMATED COUNT: 17 % (ref 11.6–15.1)
GFR SERPL CREATININE-BSD FRML MDRD: 77 ML/MIN/1.73SQ M
GLUCOSE SERPL-MCNC: 71 MG/DL (ref 65–140)
HCT VFR BLD AUTO: 19.5 % (ref 34.8–46.1)
HCT VFR BLD AUTO: 19.9 % (ref 34.8–46.1)
HGB BLD-MCNC: 6.2 G/DL (ref 11.5–15.4)
HGB BLD-MCNC: 6.2 G/DL (ref 11.5–15.4)
IMM GRANULOCYTES # BLD AUTO: >0.5 THOUSAND/UL (ref 0–0.2)
IMM GRANULOCYTES # BLD AUTO: >0.5 THOUSAND/UL (ref 0–0.2)
IMM GRANULOCYTES NFR BLD AUTO: 6 % (ref 0–2)
IMM GRANULOCYTES NFR BLD AUTO: 6 % (ref 0–2)
LYMPHOCYTES # BLD AUTO: 1.07 THOUSANDS/ΜL (ref 0.6–4.47)
LYMPHOCYTES # BLD AUTO: 1.12 THOUSANDS/ΜL (ref 0.6–4.47)
LYMPHOCYTES NFR BLD AUTO: 8 % (ref 14–44)
LYMPHOCYTES NFR BLD AUTO: 9 % (ref 14–44)
MCH RBC QN AUTO: 28.2 PG (ref 26.8–34.3)
MCH RBC QN AUTO: 28.6 PG (ref 26.8–34.3)
MCHC RBC AUTO-ENTMCNC: 31.2 G/DL (ref 31.4–37.4)
MCHC RBC AUTO-ENTMCNC: 31.8 G/DL (ref 31.4–37.4)
MCV RBC AUTO: 90 FL (ref 82–98)
MCV RBC AUTO: 91 FL (ref 82–98)
MONOCYTES # BLD AUTO: 1.27 THOUSAND/ΜL (ref 0.17–1.22)
MONOCYTES # BLD AUTO: 1.43 THOUSAND/ΜL (ref 0.17–1.22)
MONOCYTES NFR BLD AUTO: 10 % (ref 4–12)
MONOCYTES NFR BLD AUTO: 10 % (ref 4–12)
NEUTROPHILS # BLD AUTO: 10.38 THOUSANDS/ΜL (ref 1.85–7.62)
NEUTROPHILS # BLD AUTO: 9.04 THOUSANDS/ΜL (ref 1.85–7.62)
NEUTS SEG NFR BLD AUTO: 74 % (ref 43–75)
NEUTS SEG NFR BLD AUTO: 76 % (ref 43–75)
NRBC BLD AUTO-RTO: 0 /100 WBCS
NRBC BLD AUTO-RTO: 0 /100 WBCS
PLATELET # BLD AUTO: 157 THOUSANDS/UL (ref 149–390)
PLATELET # BLD AUTO: 162 THOUSANDS/UL (ref 149–390)
PMV BLD AUTO: 10.9 FL (ref 8.9–12.7)
PMV BLD AUTO: 11.6 FL (ref 8.9–12.7)
POTASSIUM SERPL-SCNC: 5.8 MMOL/L (ref 3.5–5.3)
RBC # BLD AUTO: 2.17 MILLION/UL (ref 3.81–5.12)
RBC # BLD AUTO: 2.2 MILLION/UL (ref 3.81–5.12)
SODIUM SERPL-SCNC: 133 MMOL/L (ref 136–145)
WBC # BLD AUTO: 12.2 THOUSAND/UL (ref 4.31–10.16)
WBC # BLD AUTO: 13.77 THOUSAND/UL (ref 4.31–10.16)

## 2019-09-28 PROCEDURE — 85025 COMPLETE CBC W/AUTO DIFF WBC: CPT | Performed by: INTERNAL MEDICINE

## 2019-09-28 PROCEDURE — 99232 SBSQ HOSP IP/OBS MODERATE 35: CPT | Performed by: INTERNAL MEDICINE

## 2019-09-28 PROCEDURE — P9040 RBC LEUKOREDUCED IRRADIATED: HCPCS

## 2019-09-28 PROCEDURE — 99232 SBSQ HOSP IP/OBS MODERATE 35: CPT | Performed by: OBSTETRICS & GYNECOLOGY

## 2019-09-28 PROCEDURE — 80048 BASIC METABOLIC PNL TOTAL CA: CPT | Performed by: INTERNAL MEDICINE

## 2019-09-28 RX ADMIN — CEFAZOLIN SODIUM 2000 MG: 2 SOLUTION INTRAVENOUS at 14:25

## 2019-09-28 RX ADMIN — ACETAMINOPHEN 650 MG: 325 TABLET ORAL at 22:18

## 2019-09-28 RX ADMIN — ASPIRIN 81 MG: 81 TABLET, COATED ORAL at 09:24

## 2019-09-28 RX ADMIN — ENOXAPARIN SODIUM 90 MG: 100 INJECTION SUBCUTANEOUS at 05:57

## 2019-09-28 RX ADMIN — VITAMIN D, TAB 1000IU (100/BT) 1000 UNITS: 25 TAB at 09:27

## 2019-09-28 RX ADMIN — POTASSIUM CHLORIDE 40 MEQ: 1500 TABLET, EXTENDED RELEASE ORAL at 17:50

## 2019-09-28 RX ADMIN — MUPIROCIN: 20 OINTMENT TOPICAL at 09:26

## 2019-09-28 RX ADMIN — VENLAFAXINE 75 MG: 37.5 TABLET ORAL at 09:26

## 2019-09-28 RX ADMIN — MUPIROCIN: 20 OINTMENT TOPICAL at 17:50

## 2019-09-28 RX ADMIN — ACETAMINOPHEN 650 MG: 325 TABLET ORAL at 03:58

## 2019-09-28 RX ADMIN — VENLAFAXINE 75 MG: 37.5 TABLET ORAL at 22:14

## 2019-09-28 RX ADMIN — VENLAFAXINE 75 MG: 37.5 TABLET ORAL at 17:50

## 2019-09-28 RX ADMIN — MUPIROCIN: 20 OINTMENT TOPICAL at 22:14

## 2019-09-28 RX ADMIN — CEFAZOLIN SODIUM 2000 MG: 2 SOLUTION INTRAVENOUS at 05:56

## 2019-09-28 RX ADMIN — Medication 20 MG: at 05:56

## 2019-09-28 RX ADMIN — ENOXAPARIN SODIUM 90 MG: 100 INJECTION SUBCUTANEOUS at 17:50

## 2019-09-28 RX ADMIN — ARIPIPRAZOLE 10 MG: 10 TABLET ORAL at 09:26

## 2019-09-28 RX ADMIN — ACETAMINOPHEN 650 MG: 325 TABLET ORAL at 13:10

## 2019-09-28 RX ADMIN — OXYBUTYNIN CHLORIDE 10 MG: 5 TABLET, EXTENDED RELEASE ORAL at 09:24

## 2019-09-28 RX ADMIN — CEFAZOLIN SODIUM 2000 MG: 2 SOLUTION INTRAVENOUS at 22:14

## 2019-09-28 NOTE — ASSESSMENT & PLAN NOTE
· Hemoglobin 7 5 -> 7 0 -> 7 0->6 2->6 3  · Will transfuse 2 units  · Consent obtained   · Follow AM labs

## 2019-09-28 NOTE — PROGRESS NOTES
Gyn Oncology Progress note   Samuel Melendez 60 y o  female MRN: [id number]  Unit/Bed#: [facility] 912-01 Encounter: [id number]    Assessment: 60 y o  on second-line chemotherapy for recurrent endometrial cancer, presents with MSSA sepsis      Plan:     1) Sepsis-MSSA  - MSSA bacteremia setting of Port A catheter  - Catheter removed on 09/20  - blood cultures positive on 9/20-9/21, negative since 9/22  - TTE no signs of vegatations  - CC chest showing right internal jugular septic thrombophlebitis, infection Disease recommendations 6 weeks antibiotic, cefazolin day# 7 and antibiotic therapy day # 9  - TRINH was not able to perform due to poor anatomy, she does has history of gastric bypass, EGD performed and negative for strictures and no new pathology  - Blood cultures negative for last 72 hours     2) Cough, dysphagia, this pain and  - patient is on mechanical soft thin liquid diet, she is tolerating liquids and yesterday she had soft solid foods, declined nausea vomiting  - PT/OT recommendation appreciated  - Pt is on room air  - Lungs clear to auscultate bilaterally     3) Acute kidney injury  - Status post septic shock, hypovolemia, creatinine levels elevated and return to baseline 1 9->1 6->0 9->0 6->0 54->0 85->0  9     4) Anemia  - Hemoglobin 7 5-> 7 0-> 1 pRBC transfusion-> 8 1->7 5->7 3-> 1pRBC transfusion-> AM CBC pending     5) Physical deconditioning  - PT OT recommendation appreciated    No acute events overnight  Pain is well controlled  Pt is tolerating PO  Pt is passing flatus  Pt is ambulating  Denies fevers/chills  /69   Pulse 86   Temp 97 5 °F (36 4 °C)   Resp 22   Wt 88 9 kg (196 lb)   LMP  (LMP Unknown)   SpO2 100%   BMI 39 59 kg/m²     I/O last 3 completed shifts:   In: 320 [P O :220; IV Piggyback:100]  Out: 4340 [Urine:1015]  I/O this shift:  In: 1010 [P O :960; IV Piggyback:50]  Out: 175 [Urine:175]    Lab Results   Component Value Date    WBC 17 24 (H) 09/26/2019    HGB 7 3 (L) 09/26/2019    HCT 22 5 (L) 09/26/2019    MCV 88 09/26/2019     09/26/2019       Lab Results   Component Value Date    GLUCOSE 219 (H) 12/19/2017    CALCIUM 8 6 09/26/2019     10/27/2017    K 4 4 09/26/2019    CO2 23 09/26/2019     09/26/2019    BUN 21 09/26/2019    CREATININE 0 90 09/26/2019       Lab Results   Component Value Date/Time    POCGLU 142 (H) 07/26/2019 09:33 AM    POCGLU 107 07/09/2019 11:48 AM    POCGLU 153 (H) 07/09/2019 08:16 AM    POCGLU 100 07/08/2019 08:57 PM    POCGLU 141 (H) 07/08/2019 04:46 PM       Physical Exam  General appearance - alert, well appearing, and in no distress and oriented to person, place, and time  Chest - no tachypnea, retractions or cyanosis  Heart - normal rate and regular rhythm, no murmurs noted, no gallops noted  Abdomen - soft, nontender, nondistended  Skin - normal coloration and turgor, no rashes, no suspicious skin lesions noted      Sarah Lung, MD  OB/GYN PGY-4  9/28/2019 6:57 AM

## 2019-09-28 NOTE — ASSESSMENT & PLAN NOTE
· POA, baseline approximately 0 6  · Suspect in setting of sepsis, poor p o   Intake  · Received 3 L NS in ED however unfortunately with evidence of volume overload also got some lasix  · S/p IVF  · Resolved to 0 6  · monitor post IV contrast  · Hold quinapril, renally dose medications

## 2019-09-28 NOTE — SPEECH THERAPY NOTE
RN called SLP as patient is requesting diet upgrade  Attempt to see patient, but patient refusing snack and wishes to be trialed at meal  Will trial level 3 at meal when able  D/W patient and RN

## 2019-09-28 NOTE — PROGRESS NOTES
Progress Note - Vivek Melendez 1955, 60 y o  female MRN: [id number]    Unit/Bed#: [facility] 912-01 Encounter: [id number]    Primary Care Provider: Aristides Davila,    Date and time admitted to hospital: 9/19/2019  4:09 PM        Cough  Assessment & Plan  · Kept NPO, speech evaled - rec pureed but pt denying hence place on full liquid  · Speech and swallow evaluation for dysphagia, stricture  · Cough could be from mild ongoing aspiration vs suspected septic emboli  · On RA    Acute respiratory failure (HCC)  Assessment & Plan  · Evidence upon admission with tachypnea, dyspnea, hypoxia after IV fluid resuscitation for sepsis  · s/p 20 mg IV Lasix on admission  · Improved now  · Weaning off O2 - now on RA  · Hold off on further diuresis - use Prn  · CT chest has R>L effusion    MSSA bacteremia  Assessment & Plan  · MSSA bacteremia  · Repeat BC 9/20 positive, BC from 9/21 positive, BC from 9/23 neg for 48 hrs, will be able to put picc line when 72 hrs neg  · Port removed - pocket looked infected, this is complicated line infection  · IV Vanco changed to IV ancef  · She will need 6 weeks total of antibiotics  · Check TTE negative, TRINH could not be done due to poor anatomy, she has EGD negative for findings  Acute kidney injury ([facility] [address] )  Assessment & Plan  · POA, baseline approximately 0 6  · Suspect in setting of sepsis, poor p o   Intake  · Received 3 L NS in ED however unfortunately with evidence of volume overload also got some lasix  · S/p IVF  · Resolved to 0 6  · monitor post IV contrast  · Hold quinapril, renally dose medications     Anemia due to chemotherapy  Assessment & Plan  · Hemoglobin 7 5 -> 7 0 -> 7 0->6 2->6 3  · Will transfuse 2 units  · Consent obtained   · Follow AM labs    Acute deep vein thrombosis (DVT) of proximal vein of lower extremity (HCC)  Assessment & Plan  · Eliquis on hold for  since NPO placed her on lovenox BID  · Given finding of RIJ thrombossi in CT scan with nodules in lungs, suspect septic thrombi with pulm emboli  · D/w GYN Onc team - switched to Lovenox BID permanently    Endometrial cancer [facility])  Assessment & Plan  · Appreciate Gynecologic Oncology evaluation, she received Taxol/carboplatin and received last chemotherapy 09/09/2019  · Received granix 08/31/2019, also Neulasta 09/09/2019 and 09/16/2019  · Chemotherapy to be on hold for now given acute infection  · CT CAP - shows some improvement in areas of malignancy, R>L pleural effusion, nodules in lung which can be infectious/inflammatory    Benign essential hypertension  Assessment & Plan  · Holding quinapril for now given acute kidney injury & normal BPs  · Blood pressure monitoring per protocol    * Sepsis ([facility] [address] )  Assessment & Plan  · POA with fevers, leukocytosis, tachycardia  Elevated procalcitonin on admission however with normal lactic acid level  · 2/2 MSSA bacteremia  · Vanco changed to IV Ancef  · Port a cath removed by IR - pocket was infected  · TTE performed - negative  · Repeat BC 9/20 are positive, BC 9/21 again coming back positive  · TRINH could not be done due to anatomy  EGD did not show any esophageal abnormalities           VTE Pharmacologic Prophylaxis:   Pharmacologic: Enoxaparin (Lovenox)  Mechanical VTE Prophylaxis in Place: Yes    Patient Centered Rounds: I have performed bedside rounds with nursing staff today  Discussions with Specialists or Other Care Team Provider: surgical oncology     Education and Discussions with Family / Patient: patient     Time Spent for Care: 45 minutes  More than 50% of total time spent on counseling and coordination of care as described above  Current Length of Stay: 9 day(s)    Current Patient Status: Inpatient   Certification Statement: The patient will continue to require additional inpatient hospital stay due to anemia, bacteremia treatment    Discharge Plan: possibly next 24 hrs    Code Status: Level 1 - Full Code      Subjective:   Doing well   Feels a mild sob today  She has no complaints of pain  She has no questions  Objective:     Vitals:   Temp (24hrs), Av 9 °F (36 6 °C), Min:97 5 °F (36 4 °C), Max:98 3 °F (36 8 °C)    Temp:  [97 5 °F (36 4 °C)-98 3 °F (36 8 °C)] 98 3 °F (36 8 °C)  HR:  [82-92] 90  Resp:  [20-22] 20  BP: (116-121)/(69-72) 119/70  SpO2:  [97 %-100 %] 97 %  Body mass index is 39 59 kg/m²  Input and Output Summary (last 24 hours): Intake/Output Summary (Last 24 hours) at 2019 1103  Last data filed at 2019 1001  Gross per 24 hour   Intake 1060 ml   Output 500 ml   Net 560 ml       Physical Exam:     Physical Exam   Constitutional: She is oriented to person, place, and time  She appears well-nourished  Eyes: Pupils are equal, round, and reactive to light  Conjunctivae and EOM are normal  Left eye exhibits no discharge  Neck: Normal range of motion  Neck supple  No tracheal deviation present  No thyromegaly present  Cardiovascular: Normal rate and regular rhythm  Exam reveals no gallop and no friction rub  No murmur heard  Pulmonary/Chest: Effort normal and breath sounds normal  No stridor  No respiratory distress  Abdominal: Soft  Bowel sounds are normal  She exhibits distension  There is tenderness  Musculoskeletal: She exhibits edema  She exhibits no tenderness or deformity  Neurological: She is alert and oriented to person, place, and time  No cranial nerve deficit  Coordination normal    Skin: Skin is warm and dry  No erythema  Nursing note and vitals reviewed        Additional Data:     Labs:    Results from last 7 days   Lab Units 19  0930   WBC Thousand/uL 13 77*   HEMOGLOBIN g/dL 6 2*   HEMATOCRIT % 19 9*   PLATELETS Thousands/uL 157   NEUTROS PCT % 76*   LYMPHS PCT % 8*   MONOS PCT % 10   EOS PCT % 0     Results from last 7 days   Lab Units 19  0615  19  0508   POTASSIUM mmol/L 5 8*   < > 4 7   CHLORIDE mmol/L 107   < > 101   CO2 mmol/L 19*   < > 21   BUN mg/dL 25   < > 21   CREATININE mg/dL 0 82   < > 0 85   CALCIUM mg/dL 8 9   < > 8 2*   ALK PHOS U/L  --   --  95   ALT U/L  --   --  25   AST U/L  --   --  36    < > = values in this interval not displayed  * I Have Reviewed All Lab Data Listed Above  * Additional Pertinent Lab Tests Reviewed: [address] Admission Reviewed    Imaging:    Imaging Reports Reviewed Today Include:    Imaging Personally Reviewed by Myself Includes:       Recent Cultures (last 7 days):     Results from last 7 days   Lab Units 09/24/19  1447 09/24/19  1433 09/23/19  1353 09/23/19  1338   BLOOD CULTURE  No Growth at 72 hrs  No Growth at 72 hrs  No Growth After 4 Days  No Growth After 4 Days  Last 24 Hours Medication List:     Current Facility-Administered Medications:  acetaminophen 650 mg Oral Q6H PRN Emperatriz Rosas PA-C    ARIPiprazole 10 mg Oral Daily [place] Guberylr, DO    aspirin 81 mg Oral Daily [place] Guberylr, DO    benzonatate 200 mg Oral TID PRN Emperatriz Rosas PA-C    cefazolin 2,000 mg Intravenous Q8H Obi Lance MD Last Rate: 2,000 mg (09/28/19 0556)   cholecalciferol 1,000 Units Oral Daily [place] Guberylr, DO    docusate sodium 100 mg Oral Daily PRN Maggiekylah Lee PA-C    enoxaparin 1 mg/kg Subcutaneous Q12H [address] Kaushik Reddy MD    LORazepam 1 mg Oral Q6H PRN Kaylen Roman MD    mupirocin  Topical TID Bessy Hoganr, DO    omeprazole (PRILOSEC) suspension 2 mg/mL 20 mg Oral Early Morning Kaylen Roman MD    oxybutynin 10 mg Oral Daily [place] Guberylr, DO    polyethylene glycol 17 g Oral Daily Bryant G Chirayath, DO    potassium chloride 40 mEq Oral BID Kaylen Roman MD    sodium chloride (PF) 10 mL Intracatheter Daily [place] Guitar, DO    venlafaxine 75 mg Oral TID [place] Hoganr, DO         Today, Patient Was Seen By: Kaylen Roman MD    ** Please Note: Dictation voice to text software may have been used in the creation of this document   **

## 2019-09-29 ENCOUNTER — HOSPITAL ENCOUNTER (EMERGENCY)
Facility: HOSPITAL | Age: 64
End: 2019-09-29
Attending: EMERGENCY MEDICINE
Payer: COMMERCIAL

## 2019-09-29 ENCOUNTER — HOSPITAL ENCOUNTER (INPATIENT)
Facility: HOSPITAL | Age: 64
LOS: 6 days | Discharge: NURSING, REHAB OR LONG-TERM CARE | DRG: 982 | End: 2019-10-08
Attending: INTERNAL MEDICINE | Admitting: HOSPITALIST
Payer: COMMERCIAL

## 2019-09-29 VITALS
SYSTOLIC BLOOD PRESSURE: 154 MMHG | DIASTOLIC BLOOD PRESSURE: 72 MMHG | RESPIRATION RATE: 16 BRPM | HEART RATE: 86 BPM | OXYGEN SATURATION: 98 % | TEMPERATURE: 97.7 F

## 2019-09-29 VITALS
TEMPERATURE: 98.1 F | SYSTOLIC BLOOD PRESSURE: 143 MMHG | BODY MASS INDEX: 39.59 KG/M2 | WEIGHT: 196 LBS | HEART RATE: 98 BPM | RESPIRATION RATE: 18 BRPM | OXYGEN SATURATION: 99 % | DIASTOLIC BLOOD PRESSURE: 81 MMHG

## 2019-09-29 DIAGNOSIS — I10 BENIGN ESSENTIAL HYPERTENSION: ICD-10-CM

## 2019-09-29 DIAGNOSIS — D64.9 ANEMIA: Primary | ICD-10-CM

## 2019-09-29 DIAGNOSIS — R53.1 WEAKNESS: Primary | ICD-10-CM

## 2019-09-29 DIAGNOSIS — T45.1X5A CHEMOTHERAPY-INDUCED NAUSEA: ICD-10-CM

## 2019-09-29 DIAGNOSIS — R11.0 CHEMOTHERAPY-INDUCED NAUSEA: ICD-10-CM

## 2019-09-29 DIAGNOSIS — C54.1 ENDOMETRIAL CANCER (HCC): Chronic | ICD-10-CM

## 2019-09-29 DIAGNOSIS — R52 PAIN: ICD-10-CM

## 2019-09-29 DIAGNOSIS — R78.81 MSSA BACTEREMIA: ICD-10-CM

## 2019-09-29 DIAGNOSIS — B95.61 MSSA BACTEREMIA: ICD-10-CM

## 2019-09-29 LAB
ABO GROUP BLD BPU: NORMAL
ABO GROUP BLD BPU: NORMAL
ANION GAP SERPL CALCULATED.3IONS-SCNC: 8 MMOL/L (ref 4–13)
BACTERIA BLD CULT: NORMAL
BACTERIA BLD CULT: NORMAL
BASOPHILS # BLD MANUAL: 0.15 THOUSAND/UL (ref 0–0.1)
BASOPHILS NFR MAR MANUAL: 1 % (ref 0–1)
BPU ID: NORMAL
BPU ID: NORMAL
BUN SERPL-MCNC: 24 MG/DL (ref 5–25)
CALCIUM SERPL-MCNC: 8.7 MG/DL (ref 8.3–10.1)
CHLORIDE SERPL-SCNC: 106 MMOL/L (ref 100–108)
CO2 SERPL-SCNC: 18 MMOL/L (ref 21–32)
CREAT SERPL-MCNC: 0.89 MG/DL (ref 0.6–1.3)
CROSSMATCH: NORMAL
CROSSMATCH: NORMAL
EOSINOPHIL # BLD MANUAL: 0 THOUSAND/UL (ref 0–0.4)
EOSINOPHIL NFR BLD MANUAL: 0 % (ref 0–6)
ERYTHROCYTE [DISTWIDTH] IN BLOOD BY AUTOMATED COUNT: 16.6 % (ref 11.6–15.1)
ERYTHROCYTE [DISTWIDTH] IN BLOOD BY AUTOMATED COUNT: 17 % (ref 11.6–15.1)
GFR SERPL CREATININE-BSD FRML MDRD: 70 ML/MIN/1.73SQ M
GLUCOSE SERPL-MCNC: 74 MG/DL (ref 65–140)
HCT VFR BLD AUTO: 24.5 % (ref 34.8–46.1)
HCT VFR BLD AUTO: 29.1 % (ref 34.8–46.1)
HGB BLD-MCNC: 8 G/DL (ref 11.5–15.4)
HGB BLD-MCNC: 9.6 G/DL (ref 11.5–15.4)
LYMPHOCYTES # BLD AUTO: 0.58 THOUSAND/UL (ref 0.6–4.47)
LYMPHOCYTES # BLD AUTO: 4 % (ref 14–44)
MCH RBC QN AUTO: 28 PG (ref 26.8–34.3)
MCH RBC QN AUTO: 28.5 PG (ref 26.8–34.3)
MCHC RBC AUTO-ENTMCNC: 32.7 G/DL (ref 31.4–37.4)
MCHC RBC AUTO-ENTMCNC: 33 G/DL (ref 31.4–37.4)
MCV RBC AUTO: 86 FL (ref 82–98)
MCV RBC AUTO: 86 FL (ref 82–98)
MONOCYTES # BLD AUTO: 0.58 THOUSAND/UL (ref 0–1.22)
MONOCYTES NFR BLD: 4 % (ref 4–12)
MYELOCYTES NFR BLD MANUAL: 1 % (ref 0–1)
NEUTROPHILS # BLD MANUAL: 13.1 THOUSAND/UL (ref 1.85–7.62)
NEUTS SEG NFR BLD AUTO: 90 % (ref 43–75)
NRBC BLD AUTO-RTO: 0 /100 WBCS
PLATELET # BLD AUTO: 151 THOUSANDS/UL (ref 149–390)
PLATELET # BLD AUTO: 155 THOUSANDS/UL (ref 149–390)
PLATELET BLD QL SMEAR: ADEQUATE
PMV BLD AUTO: 10.7 FL (ref 8.9–12.7)
PMV BLD AUTO: 10.9 FL (ref 8.9–12.7)
POIKILOCYTOSIS BLD QL SMEAR: PRESENT
POTASSIUM SERPL-SCNC: 5.5 MMOL/L (ref 3.5–5.3)
RBC # BLD AUTO: 2.86 MILLION/UL (ref 3.81–5.12)
RBC # BLD AUTO: 3.37 MILLION/UL (ref 3.81–5.12)
RBC MORPH BLD: PRESENT
SODIUM SERPL-SCNC: 132 MMOL/L (ref 136–145)
UNIT DISPENSE STATUS: NORMAL
UNIT DISPENSE STATUS: NORMAL
UNIT PRODUCT CODE: NORMAL
UNIT PRODUCT CODE: NORMAL
UNIT RH: NORMAL
UNIT RH: NORMAL
WBC # BLD AUTO: 14.56 THOUSAND/UL (ref 4.31–10.16)
WBC # BLD AUTO: 15.72 THOUSAND/UL (ref 4.31–10.16)

## 2019-09-29 PROCEDURE — 99231 SBSQ HOSP IP/OBS SF/LOW 25: CPT | Performed by: OBSTETRICS & GYNECOLOGY

## 2019-09-29 PROCEDURE — 85007 BL SMEAR W/DIFF WBC COUNT: CPT | Performed by: INTERNAL MEDICINE

## 2019-09-29 PROCEDURE — 85027 COMPLETE CBC AUTOMATED: CPT | Performed by: INTERNAL MEDICINE

## 2019-09-29 PROCEDURE — 99239 HOSP IP/OBS DSCHRG MGMT >30: CPT | Performed by: INTERNAL MEDICINE

## 2019-09-29 PROCEDURE — 85027 COMPLETE CBC AUTOMATED: CPT | Performed by: OBSTETRICS & GYNECOLOGY

## 2019-09-29 PROCEDURE — 99285 EMERGENCY DEPT VISIT HI MDM: CPT | Performed by: EMERGENCY MEDICINE

## 2019-09-29 PROCEDURE — 99220 PR INITIAL OBSERVATION CARE/DAY 70 MINUTES: CPT | Performed by: PHYSICIAN ASSISTANT

## 2019-09-29 PROCEDURE — 92526 ORAL FUNCTION THERAPY: CPT

## 2019-09-29 PROCEDURE — 80048 BASIC METABOLIC PNL TOTAL CA: CPT | Performed by: INTERNAL MEDICINE

## 2019-09-29 PROCEDURE — 99285 EMERGENCY DEPT VISIT HI MDM: CPT

## 2019-09-29 RX ORDER — PANTOPRAZOLE SODIUM 40 MG/1
40 TABLET, DELAYED RELEASE ORAL
Status: DISCONTINUED | OUTPATIENT
Start: 2019-09-30 | End: 2019-10-08 | Stop reason: HOSPADM

## 2019-09-29 RX ORDER — MELATONIN
1000 DAILY
Status: DISCONTINUED | OUTPATIENT
Start: 2019-09-30 | End: 2019-10-08 | Stop reason: HOSPADM

## 2019-09-29 RX ORDER — BENZONATATE 200 MG/1
200 CAPSULE ORAL 3 TIMES DAILY PRN
Qty: 20 CAPSULE | Refills: 0 | Status: ON HOLD | OUTPATIENT
Start: 2019-09-29 | End: 2019-11-12 | Stop reason: ALTCHOICE

## 2019-09-29 RX ORDER — LORAZEPAM 1 MG/1
1 TABLET ORAL EVERY 6 HOURS PRN
Status: DISCONTINUED | OUTPATIENT
Start: 2019-09-29 | End: 2019-10-08 | Stop reason: HOSPADM

## 2019-09-29 RX ORDER — ACETAMINOPHEN 325 MG/1
650 TABLET ORAL EVERY 6 HOURS PRN
Status: DISCONTINUED | OUTPATIENT
Start: 2019-09-29 | End: 2019-10-02

## 2019-09-29 RX ORDER — ARIPIPRAZOLE 10 MG/1
10 TABLET ORAL DAILY
Status: DISCONTINUED | OUTPATIENT
Start: 2019-09-30 | End: 2019-10-08 | Stop reason: HOSPADM

## 2019-09-29 RX ORDER — ASPIRIN 81 MG/1
81 TABLET ORAL DAILY
Status: DISCONTINUED | OUTPATIENT
Start: 2019-09-30 | End: 2019-10-05

## 2019-09-29 RX ORDER — CEFAZOLIN SODIUM 2 G/50ML
2000 SOLUTION INTRAVENOUS EVERY 8 HOURS
Refills: 0 | Status: SHIPPED | OUTPATIENT
Start: 2019-09-29 | End: 2019-11-04

## 2019-09-29 RX ORDER — POLYETHYLENE GLYCOL 3350 17 G/17G
17 POWDER, FOR SOLUTION ORAL DAILY
Qty: 14 EACH | Refills: 0 | Status: SHIPPED | OUTPATIENT
Start: 2019-09-30 | End: 2019-10-08 | Stop reason: HOSPADM

## 2019-09-29 RX ORDER — CEFAZOLIN SODIUM 2 G/50ML
2000 SOLUTION INTRAVENOUS EVERY 8 HOURS
Status: DISCONTINUED | OUTPATIENT
Start: 2019-09-29 | End: 2019-10-08 | Stop reason: HOSPADM

## 2019-09-29 RX ORDER — VENLAFAXINE 37.5 MG/1
75 TABLET ORAL 3 TIMES DAILY
Status: DISCONTINUED | OUTPATIENT
Start: 2019-09-29 | End: 2019-10-08 | Stop reason: HOSPADM

## 2019-09-29 RX ORDER — BENZONATATE 100 MG/1
200 CAPSULE ORAL 3 TIMES DAILY PRN
Status: DISCONTINUED | OUTPATIENT
Start: 2019-09-29 | End: 2019-10-08 | Stop reason: HOSPADM

## 2019-09-29 RX ADMIN — ENOXAPARIN SODIUM 90 MG: 100 INJECTION SUBCUTANEOUS at 06:07

## 2019-09-29 RX ADMIN — SODIUM CHLORIDE 10 ML: 9 INJECTION, SOLUTION INTRAMUSCULAR; INTRAVENOUS; SUBCUTANEOUS at 08:45

## 2019-09-29 RX ADMIN — ASPIRIN 81 MG: 81 TABLET, COATED ORAL at 08:45

## 2019-09-29 RX ADMIN — Medication 20 MG: at 06:09

## 2019-09-29 RX ADMIN — OXYBUTYNIN CHLORIDE 10 MG: 5 TABLET, EXTENDED RELEASE ORAL at 08:45

## 2019-09-29 RX ADMIN — VITAMIN D, TAB 1000IU (100/BT) 1000 UNITS: 25 TAB at 08:45

## 2019-09-29 RX ADMIN — CEFAZOLIN SODIUM 2000 MG: 2 SOLUTION INTRAVENOUS at 06:07

## 2019-09-29 RX ADMIN — VENLAFAXINE 75 MG: 37.5 TABLET ORAL at 08:45

## 2019-09-29 RX ADMIN — ARIPIPRAZOLE 10 MG: 10 TABLET ORAL at 08:45

## 2019-09-29 RX ADMIN — CEFAZOLIN SODIUM 2000 MG: 2 SOLUTION INTRAVENOUS at 23:49

## 2019-09-29 RX ADMIN — ENOXAPARIN SODIUM 90 MG: 100 INJECTION SUBCUTANEOUS at 23:44

## 2019-09-29 RX ADMIN — POTASSIUM CHLORIDE 40 MEQ: 1500 TABLET, EXTENDED RELEASE ORAL at 08:45

## 2019-09-29 RX ADMIN — ACETAMINOPHEN 650 MG: 325 TABLET ORAL at 23:45

## 2019-09-29 NOTE — ED PROVIDER NOTES
History  Chief Complaint   Patient presents with    Weakness - Generalized     pt was discharged from [facility] today  she was do go to rehab but she did not want to go  upon arriving home, pt was unable to get out of the car  pt had to crawl into the house and laid on the floor  pt reports cancer in her right groin area  pt was being treated for an infected port and now has a right picc  pt has nephrostomy tube on right     Patient presents to the emergency department via ambulance after she was discharged from [facility] roughly at 2:00 p m  Today  Allegedly hospital wanted her to go to rehab and patient refused  Patient was transported home via ambulance and when got home could not get upper steps and had to crawl on the floor  She was then unable to get up off the floor  She has no specific complaints other than weakness  She denies chest pain sob  Denies fever chills  Denies nausea vomiting diarrhea  Prior to Admission Medications   Prescriptions Last Dose Informant Patient Reported? Taking? ARIPiprazole (ABILIFY) 10 mg tablet 9/29/2019 at Unknown time Self Yes Yes   Sig: Take 10 mg by mouth daily   CRANIAL PROSTHESIS, RX,   No No   Sig: Please provide patient with extracranial prosthesis while undergoing active chemotherapy treatment     Ferrous Sulfate (IRON) 28 MG TABS Past Week at Unknown time Self Yes Yes   Sig: Take by mouth   LORazepam (ATIVAN) 1 mg tablet Past Week at Unknown time Self No Yes   Sig: Take 1 tablet (1 mg total) by mouth every 6 (six) hours as needed for anxiety (or nausea)   MYRBETRIQ 50 MG TB24 9/29/2019 at Unknown time Self No Yes   Sig: TAKE 1 TABLET DAILY   aspirin (ECOTRIN LOW STRENGTH) 81 mg EC tablet 9/29/2019 at Unknown time Self Yes Yes   Sig: Take 81 mg by mouth daily   benzonatate (TESSALON) 200 MG capsule 9/28/2019 at Unknown time  No Yes   Sig: Take 1 capsule (200 mg total) by mouth 3 (three) times a day as needed for cough   ceFAZolin (ANCEF) 2000 mg IVPB 9/29/2019 at Unknown time  No Yes   Sig: Infuse 2,000 mg into a venous catheter every 8 (eight) hours   cholecalciferol (VITAMIN D3) 1,000 units tablet 9/29/2019 at Unknown time Self Yes Yes   Sig: Take 1,000 Units by mouth daily   enoxaparin (LOVENOX) 100 mg/mL 9/29/2019 at Unknown time  No Yes   Sig: Inject 0 9 mL (90 mg total) under the skin every 12 (twelve) hours   gemfibrozil (LOPID) 600 mg tablet 9/28/2019 at Unknown time Self No Yes   Sig: TAKE 1 TABLET BY MOUTH DAILY   mupirocin (BACTROBAN) 2 % ointment 9/29/2019 at Unknown time Self No Yes   Sig: Apply topically 3 (three) times a day   omeprazole (PriLOSEC) 20 mg delayed release capsule 9/29/2019 at Unknown time Self Yes Yes   Sig: Take 20 mg by mouth daily   ondansetron (ZOFRAN) 8 mg tablet Past Week at Unknown time Self No Yes   Sig: Take 1 tablet (8 mg total) by mouth every 8 (eight) hours as needed for nausea or vomiting   polyethylene glycol (MIRALAX) 17 g packet 9/29/2019 at Unknown time  No Yes   Sig: Take 17 g by mouth daily   sodium chloride, PF, 0 9 %  Self No No   Sig: 10 mL by Intracatheter route daily for 30 days   venlafaxine (EFFEXOR) 75 mg tablet 9/29/2019 at Unknown time Self Yes Yes   Sig: Take 75 mg by mouth 3 (three) times a day        Facility-Administered Medications: None       Past Medical History:   Diagnosis Date    Anemia     Chemotherapy follow-up examination     Depression     Diabetes mellitus ([facility] [address] )     Endometrial cancer ([facility] [address] ) 12/2017    Hyperglycemia     vx type 2 dm -- last assessed 4/1/14; resolved 11/7/17    Hyperlipidemia     Hypertension     Obesity     last assessed 10/14/17; resolved 11/7/17       Past Surgical History:   Procedure Laterality Date    ABDOMINAL SURGERY      GASTRIC BYPASS    CHOLECYSTECTOMY      at the time of gastric bypass    COLONOSCOPY      CT NEEDLE BIOPSY LYMPH NODE  7/8/2019    GASTRIC BYPASS      HYSTERECTOMY Bilateral     total abdominal with salpingo-oophorectomy    IR PICC LINE  9/27/2019    IR PORT PLACEMENT  7/26/2019    IR PORT REMOVAL  9/20/2019    IR TUBE PLACEMENT NEPHROSTOMY  7/26/2019    OOPHORECTOMY Bilateral     CA LAP, RADICAL HYST W/ TUBE&OV, NODE BX N/A 12/19/2017    Procedure: HYSTERECTOMY LAPAROSCOPIC TOTAL ([address]) W/ ROBOTICS; BILATERAL SALPINGOOPHERECTOMY; LYMPH NODE DISSECTION; lysis of adhesions;  Surgeon: Sharri Birmingham MD;  [facility]: [facility];  Service: Gynecology Oncology    CA LAP,DIAGNOSTIC ABDOMEN N/A 12/19/2017    Procedure: LAPAROSCOPY DIAGNOSTIC;  Surgeon: Sharri Birmingham MD;  [facility];  Service: Gynecology Oncology    TONSILLECTOMY      US GUIDED BREAST BIOPSY RIGHT COMPLETE Right 6/28/2019       Family History   Problem Relation Age of Onset    Other Mother         dyslipidemia    Ovarian cancer Mother 48    Lymphoma Father     Bone cancer Maternal Grandfather     No Known Problems Sister     No Known Problems Maternal Grandmother     No Known Problems Paternal Grandmother     No Known Problems Paternal Grandfather     No Known Problems Maternal Aunt     No Known Problems Maternal Aunt     No Known Problems Maternal Aunt     No Known Problems Maternal Aunt     No Known Problems Paternal Aunt     No Known Problems Paternal Aunt     No Known Problems Paternal Aunt     No Known Problems Paternal Aunt     No Known Problems Brother     No Known Problems Brother      I have reviewed and agree with the history as documented  Social History     Tobacco Use    Smoking status: Never Smoker    Smokeless tobacco: Never Used   Substance Use Topics    Alcohol use: Not Currently    Drug use: No        Review of Systems   Constitutional: Positive for fatigue  Negative for activity change, appetite change, chills, diaphoresis and fever  HENT: Negative  Negative for congestion, drooling, rhinorrhea, sinus pain, sore throat, trouble swallowing and voice change  Eyes: Negative    Negative for photophobia and visual disturbance  Respiratory: Negative  Negative for chest tightness, shortness of breath, wheezing and stridor  Cardiovascular: Negative  Negative for chest pain, palpitations and leg swelling  Gastrointestinal: Negative  Negative for abdominal distention, abdominal pain, anal bleeding, blood in stool, constipation, diarrhea, nausea and vomiting  Endocrine: Negative  Genitourinary: Negative  Negative for difficulty urinating, dysuria, flank pain, frequency, hematuria, urgency, vaginal bleeding, vaginal discharge and vaginal pain  Musculoskeletal: Negative  Negative for back pain, neck pain and neck stiffness  Skin: Negative  Negative for rash and wound  Allergic/Immunologic: Negative  Neurological: Positive for weakness  Negative for dizziness, tremors, seizures, syncope, facial asymmetry, speech difficulty, light-headedness, numbness and headaches  Hematological: Negative  Does not bruise/bleed easily  Psychiatric/Behavioral: Negative  Negative for agitation, behavioral problems and confusion  Physical Exam  Physical Exam   Constitutional: She is oriented to person, place, and time  She appears well-developed and well-nourished  No distress  Nontoxic appearance  No respiratory distress  Patient able to engage in normal conversation and answer simple questions appropriately and follow simple commands  Patient does not appear to be in pain  HENT:   Head: Normocephalic and atraumatic  Right Ear: External ear normal    Left Ear: External ear normal    Nose: Nose normal    Mouth/Throat: Oropharynx is clear and moist    Eyes: Pupils are equal, round, and reactive to light  Conjunctivae and EOM are normal    Neck: Normal range of motion  Neck supple  Cardiovascular: Normal rate, regular rhythm, normal heart sounds and intact distal pulses  Pulmonary/Chest: Effort normal and breath sounds normal  No stridor  No respiratory distress  She has no wheezes  She has no rales   She exhibits no tenderness  Abdominal: Soft  Bowel sounds are normal  She exhibits no distension and no mass  There is no tenderness  There is no rebound and no guarding  No hernia  Musculoskeletal: Normal range of motion  She exhibits no edema, tenderness or deformity  Neurological: She is alert and oriented to person, place, and time  She has normal reflexes  She displays normal reflexes  No cranial nerve deficit or sensory deficit  She exhibits normal muscle tone  Coordination normal    Skin: Skin is warm and dry  No rash noted  She is not diaphoretic  No erythema  No pallor  Psychiatric: She has a normal mood and affect  Her behavior is normal  Judgment and thought content normal    Nursing note and vitals reviewed  Vital Signs  ED Triage Vitals   Temperature Pulse Respirations Blood Pressure SpO2   09/29/19 1727 09/29/19 1727 09/29/19 1727 09/29/19 1727 09/29/19 1727   97 7 °F (36 5 °C) 85 16 160/75 100 %      Temp Source Heart Rate Source Patient Position - Orthostatic VS BP Location FiO2 (%)   09/29/19 1727 09/29/19 1727 09/29/19 1727 09/29/19 1939 --   Oral Monitor Lying Right arm       Pain Score       --                  Vitals:    09/29/19 1727 09/29/19 1939   BP: 160/75 154/72   Pulse: 85 86   Patient Position - Orthostatic VS: Lying Lying         Visual Acuity      ED Medications  Medications - No data to display    Diagnostic Studies  Results Reviewed     None                 No orders to display              Procedures  Procedures       ED Course  ED Course as of Sep 29 1949   Alfredito Dempsey Sep 29, 2019   1947 Patient accepted by Xochitl Moraes of the hospitalist service at [facility] where she was discharged a few hours prior  Transport being set up  Disposition time pending                                    MDM    Disposition  Final diagnoses:   Weakness     Time reflects when diagnosis was documented in both MDM as applicable and the Disposition within this note     Time User Action Codes Description Comment    9/29/2019  7:48 PM Miles Morning Add [R53 1] Weakness       ED Disposition     ED Disposition Condition Date/Time Comment    Transfer to Another Facility-In Network  [place] Sep 29, 2019  7:48 PM Sammi Clay should be transferred [facility] to [facility] Adwoa Morrell MD Documentation      Most Recent Value   Patient Condition  The patient has been stabilized such that within reasonable medical probability, no material deterioration of the patient condition or the condition of the unborn child(kimi) is likely to result from the transfer   Reason for Transfer  Other (Include comment)____________________   Benefits of Transfer  Continuity of care   Risks of Transfer  Potential for delay in receiving treatment   Accepting Physician  Adwoa Crane      Follow-up Information    None         Patient's Medications   Discharge Prescriptions    No medications on file     No discharge procedures on file      ED Provider  Electronically Signed by           Eduard Amaya MD  09/29/19 6929

## 2019-09-29 NOTE — SPEECH THERAPY NOTE
Speech Language/Pathology    Speech/Language Pathology Progress Note    Patient Name: Aliza Morris  Today's Date: 9/29/2019     Problem List  Principal Problem:    Sepsis ([facility] )  Active Problems:    Benign essential hypertension    Endometrial cancer ([facility] )    Acute deep vein thrombosis (DVT) of proximal vein of lower extremity (HCC)    Anemia due to chemotherapy    Acute kidney injury ([facility] )    Hyponatremia    MSSA bacteremia    Acute respiratory failure (HCC)    Cough       Past Medical History  Past Medical History:   Diagnosis Date    Anemia     Chemotherapy follow-up examination     Depression     Diabetes mellitus ([facility] )     Endometrial cancer ([facility] ) 12/2017    Hyperglycemia     vx type 2 dm -- last assessed 4/1/14; resolved 11/7/17    Hyperlipidemia     Hypertension     Obesity     last assessed 10/14/17; resolved 11/7/17        Past Surgical History  Past Surgical History:   Procedure Laterality Date    ABDOMINAL SURGERY      GASTRIC BYPASS    CHOLECYSTECTOMY      at the time of gastric bypass    COLONOSCOPY      CT NEEDLE BIOPSY LYMPH NODE  7/8/2019    GASTRIC BYPASS      HYSTERECTOMY Bilateral     total abdominal with salpingo-oophorectomy    IR PICC LINE  9/27/2019    IR PORT PLACEMENT  7/26/2019    IR PORT REMOVAL  9/20/2019    IR TUBE PLACEMENT NEPHROSTOMY  7/26/2019    OOPHORECTOMY Bilateral     NV LAP, RADICAL HYST W/ TUBE&OV, NODE BX N/A 12/19/2017    Procedure: HYSTERECTOMY LAPAROSCOPIC TOTAL ([address]) W/ ROBOTICS; BILATERAL SALPINGOOPHERECTOMY; LYMPH NODE DISSECTION; lysis of adhesions;  Surgeon: Sharyn Sebastian MD;  [facility];  Service: Gynecology Oncology    NV LAP,DIAGNOSTIC ABDOMEN N/A 12/19/2017    Procedure: LAPAROSCOPY DIAGNOSTIC;  Surgeon: Sharyn Sebastian MD;  [facility];  Service: Gynecology Oncology    TONSILLECTOMY      US GUIDED BREAST BIOPSY RIGHT COMPLETE Right 6/28/2019         Subjective:  Pt seen for dysphagia tx   Pt sitting up in chair, family in room  "I'm going home today"  Objective:  Pt is packing to go home, but is willing to eat a small amount for tx  She refused to eat her lunch, due to not liking the food  Plan was to trial dysphagia 3 meal, but since she is going home, unable to do that  Pt is having loose stools and is afraid to eat much that might make this worse  She was willing to eat a few bites of toast with butter, and she drank water  Offered other food items, but pt refused all  Mastication, bolus formation and transfer were prompt and WNL  No residue observed  Pharyngeal swallows appeared prompt  With single and consecutive sips of water by straw, pharyngeal swallows were prompt  There were no s/s of aspiration with the toast nor with water  Pt and family reports reduced appetite at home 2* CA tx  She does plan to return to eating a regular diet at home; discussed s/s of aspiration, and encouraged pt to notify physician if any difficulty at home  Aspiration precautions discussed with pt and family, who verbalized understanding  Assessment:  Reassessment was very limited, due to pt only agreeable to eating a few bites of toast  However, oropharyngeal swallow appeared [facility] with the limited sample  Pt plans to return to regular diet at home  This appears appropriate, however, given minimal intake during tx, pt was advised to notify physician immediately if any dysphagia sx are noted at home  Plan/Recommendations:  Pt is being discharged home this afternoon  She appears appropriate for an upgrade to dysphagia 3/regular diet  Pt should not require further dysphagia tx at discharge   However, pt will notify physician if any difficulty, and can get a referral for OP/home health ST

## 2019-09-29 NOTE — EMTALA/ACUTE CARE TRANSFER
[address] [place] [address]  Dept: [phone]      EMTALA TRANSFER CONSENT    NAME Tonya Ho                                         1955                              MRN [id number]    I have been informed of my rights regarding examination, treatment, and transfer   by Dr Malissa Tierney MD    Benefits: Continuity of care    Risks: Potential for delay in receiving treatment      Transfer Request   I acknowledge that my medical condition has been evaluated and explained to me by the emergency department physician or other qualified medical person and/or my attending physician who has recommended and offered to me further medical examination and treatment  I understand the Hospital's obligation with respect to the treatment and stabilization of my emergency medical condition  I nevertheless request to be transferred  I release the Hospital, the doctor, and any other persons caring for me from all responsibility or liability for any injury or ill effects that may result from my transfer and agree to accept all responsibility for the consequences of my choice to transfer, rather than receive stabilizing treatment at the Hospital  I understand that because the transfer is my request, my insurance may not provide reimbursement for the services  The Hospital will assist and direct me and my family in how to make arrangements for transfer, but the hospital is not liable for any fees charged by the transport service  In spite of this understanding, I refuse to consent to further medical examination and treatment which has been offered to me, and request transfer to    I authorize the performance of emergency medical procedures and treatments upon me in both transit and upon arrival at the receiving facility    Additionally, I authorize the release of any and all medical records to the receiving facility and request they be transported with me, if possible  I authorize the performance of emergency medical procedures and treatments upon me in both transit and upon arrival at the receiving facility  Additionally, I authorize the release of any and all medical records to the receiving facility and request they be transported with me, if possible  I understand that the safest mode of transportation during a medical emergency is an ambulance and that the Hospital advocates the use of this mode of transport  Risks of traveling to the receiving facility by car, including absence of medical control, life sustaining equipment, such as oxygen, and medical personnel has been explained to me and I fully understand them  (DEVAUGHN CORRECT BOX BELOW)  [  ]  I consent to the stated transfer and to be transported by ambulance/helicopter  [  ]  I consent to the stated transfer, but refuse transportation by ambulance and accept full responsibility for my transportation by car  I understand the risks of non-ambulance transfers and I exonerate the Hospital and its staff from any deterioration in my condition that results from this refusal     X___________________________________________    DATE  19  TIME________  Signature of patient or legally responsible individual signing on patient behalf           RELATIONSHIP TO PATIENT_________________________          Provider Certification    NAME Harmony Spears                                         1955                              MRN [id number]    A medical screening exam was performed on the above named patient  Based on the examination:    Condition Necessitating Transfer The encounter diagnosis was Weakness      Patient Condition: The patient has been stabilized such that within reasonable medical probability, no material deterioration of the patient condition or the condition of the unborn child(kimi) is likely to result from the transfer    Reason for Transfer: Other (Include comment)____________________    Transfer Requirements: Facility     · Space available and qualified personnel available for treatment as acknowledged by    · Agreed to accept transfer and to provide appropriate medical treatment as acknowledged by       Raphael Mancia  · Appropriate medical records of the examination and treatment of the patient are provided at the time of transfer   [address] [facility], [address] _______  · Transfer will be performed by qualified personnel from    and appropriate transfer equipment as required, including the use of necessary and appropriate life support measures  Provider Certification: I have examined the patient and explained the following risks and benefits of being transferred/refusing transfer to the patient/family:         Based on these reasonable risks and benefits to the patient and/or the unborn child(kimi), and based upon the information available at the time of the patients examination, I certify that the medical benefits reasonably to be expected from the provision of appropriate medical treatments at another medical facility outweigh the increasing risks, if any, to the individuals medical condition, and in the case of labor to the unborn child, from effecting the transfer      X____________________________________________ DATE 09/29/19        TIME_______      ORIGINAL - SEND TO MEDICAL RECORDS   COPY - SEND WITH PATIENT DURING TRANSFER

## 2019-09-29 NOTE — EMTALA/ACUTE CARE TRANSFER
[address] [place] [address]  Dept: [phone]      EMTALA TRANSFER CONSENT    NAME Tonya Gallego                                         1955                              MRN [id number]    I have been informed of my rights regarding examination, treatment, and transfer   by Dr Soraya Cunningham MD    Benefits: Continuity of care    Risks: Potential for delay in receiving treatment      Transfer Request   I acknowledge that my medical condition has been evaluated and explained to me by the emergency department physician or other qualified medical person and/or my attending physician who has recommended and offered to me further medical examination and treatment  I understand the Hospital's obligation with respect to the treatment and stabilization of my emergency medical condition  I nevertheless request to be transferred  I release the Hospital, the doctor, and any other persons caring for me from all responsibility or liability for any injury or ill effects that may result from my transfer and agree to accept all responsibility for the consequences of my choice to transfer, rather than receive stabilizing treatment at the Hospital  I understand that because the transfer is my request, my insurance may not provide reimbursement for the services  The Hospital will assist and direct me and my family in how to make arrangements for transfer, but the hospital is not liable for any fees charged by the transport service  In spite of this understanding, I refuse to consent to further medical examination and treatment which has been offered to me, and request transfer to    I authorize the performance of emergency medical procedures and treatments upon me in both transit and upon arrival at the receiving facility    Additionally, I authorize the release of any and all medical records to the receiving facility and request they be transported with me, if possible  I authorize the performance of emergency medical procedures and treatments upon me in both transit and upon arrival at the receiving facility  Additionally, I authorize the release of any and all medical records to the receiving facility and request they be transported with me, if possible  I understand that the safest mode of transportation during a medical emergency is an ambulance and that the Hospital advocates the use of this mode of transport  Risks of traveling to the receiving facility by car, including absence of medical control, life sustaining equipment, such as oxygen, and medical personnel has been explained to me and I fully understand them  (DEVAUGHN CORRECT BOX BELOW)  [  ]  I consent to the stated transfer and to be transported by ambulance/helicopter  [  ]  I consent to the stated transfer, but refuse transportation by ambulance and accept full responsibility for my transportation by car  I understand the risks of non-ambulance transfers and I exonerate the Hospital and its staff from any deterioration in my condition that results from this refusal     X___________________________________________    DATE  19  TIME________  Signature of patient or legally responsible individual signing on patient behalf           RELATIONSHIP TO PATIENT_________________________          Provider Certification    NAME Bernardino Hernandez                                        [facility] 1955                              MRN [id number]    A medical screening exam was performed on the above named patient  Based on the examination:    Condition Necessitating Transfer The encounter diagnosis was Weakness      Patient Condition: The patient has been stabilized such that within reasonable medical probability, no material deterioration of the patient condition or the condition of the unborn child(kiim) is likely to result from the transfer    Reason for Transfer: Other (Include comment)____________________    Transfer Requirements: Facility     · Space available and qualified personnel available for treatment as acknowledged by    · Agreed to accept transfer and to provide appropriate medical treatment as acknowledged by       Elizabeth Flores  · Appropriate medical records of the examination and treatment of the patient are provided at the time of transfer   [address] [facility], [address] _______  · Transfer will be performed by qualified personnel from    and appropriate transfer equipment as required, including the use of necessary and appropriate life support measures  Provider Certification: I have examined the patient and explained the following risks and benefits of being transferred/refusing transfer to the patient/family:         Based on these reasonable risks and benefits to the patient and/or the unborn child(kimi), and based upon the information available at the time of the patients examination, I certify that the medical benefits reasonably to be expected from the provision of appropriate medical treatments at another medical facility outweigh the increasing risks, if any, to the individuals medical condition, and in the case of labor to the unborn child, from effecting the transfer      X____________________________________________ DATE 09/29/19        TIME_______      ORIGINAL - SEND TO MEDICAL RECORDS   COPY - SEND WITH PATIENT DURING TRANSFER

## 2019-09-29 NOTE — PROGRESS NOTES
Gyn Oncology Progress note   Carolina Melendez 60 y o  female MRN: [id number]  Unit/Bed#: [facility] 393-63 Encounter: [id number]    Assessment: 64 y  o  on second-line chemotherapy for recurrent endometrial cancer, presents with MSSA sepsis      Plan:     1) Sepsis-MSSA  - MSSA bacteremia setting of Port A catheter  - Catheter removed on 09/20  - blood cultures positive on 9/20-9/21, negative since 9/22  - TTE no signs of vegatations  -[address] chest showing right internal jugular septic thrombophlebitis, infection Disease recommendations 6 weeks antibiotic, cefazolin day# 8 and antibiotic therapy day # 10  - TRINH was not able to perform due to poor anatomy, she does has history of gastric bypass, EGD performed and negative for strictures and no new pathology  - Blood cultures negative for > 72 hours     2) Cough, dysphagia, this pain and  - patient is on mechanical soft thin liquid diet, she is tolerating liquids and yesterday she had soft solid foods, declined nausea vomiting  - PT/OT recommendation appreciated  - Pt is on room air  - Lungs clear to auscultate bilaterally     3) Acute kidney injury  - Status post septic shock, hypovolemia, creatinine levels elevated and return to baseline 1 9->1 6->0 9->0 6->0 54->0 85->0 9->0 89     4) Anemia  - Hemoglobin 7 5-> 7 0-> 1 pRBC transfusion-> 8 1->7 5->7 3-> 1pRBC transfusion-> 6 2 -> 2u pRBCs yesterday --> 9 6 this AM --> 8 0     5) Physical deconditioning  - PT OT recommendation appreciated    Dispo: patient is doing well, stable Hb following transfusion  Anticipate discharge home today     No acute events overnight  Pain is well controlled  Pt is tolerating PO  Pt is passing flatus  Pt is ambulating  Denies fevers/chills  /81   Pulse 98   Temp 98 1 °F (36 7 °C)   Resp 18   Wt 88 9 kg (196 lb)   LMP  (LMP Unknown)   SpO2 99%   BMI 39 59 kg/m²     I/O last 3 completed shifts:   In: 2130 [P O :1380; Blood:700; IV Piggyback:50]  Out: 626 [Urine:626]  No intake/output data recorded      Lab Results   Component Value Date    WBC 15 72 (H) 09/29/2019    HGB 8 0 (L) 09/29/2019    HCT 24 5 (L) 09/29/2019    MCV 86 09/29/2019     09/29/2019       Lab Results   Component Value Date    GLUCOSE 219 (H) 12/19/2017    CALCIUM 8 7 09/29/2019     10/27/2017    K 5 5 (H) 09/29/2019    CO2 18 (L) 09/29/2019     09/29/2019    BUN 24 09/29/2019    CREATININE 0 89 09/29/2019       Lab Results   Component Value Date/Time    POCGLU 142 (H) 07/26/2019 09:33 AM    POCGLU 107 07/09/2019 11:48 AM    POCGLU 153 (H) 07/09/2019 08:16 AM    POCGLU 100 07/08/2019 08:57 PM    POCGLU 141 (H) 07/08/2019 04:46 PM       Physical Exam  General appearance - alert, well appearing, and in no distress and oriented to person, place, and time  Chest - no tachypnea, retractions or cyanosis  Heart - normal rate and regular rhythm, no murmurs noted, no gallops noted  Abdomen - soft, nontender, nondistended  Skin - normal coloration and turgor, no rashes, no suspicious skin lesions noted    Marilin Marie MD  OB/GYN PGY-4  9/29/2019 2:33 PM

## 2019-09-30 ENCOUNTER — APPOINTMENT (OUTPATIENT)
Dept: RADIOLOGY | Facility: HOSPITAL | Age: 64
DRG: 982 | End: 2019-09-30
Payer: COMMERCIAL

## 2019-09-30 PROBLEM — E87.5 HYPERKALEMIA: Status: ACTIVE | Noted: 2019-09-30

## 2019-09-30 PROBLEM — R60.0 BILATERAL LOWER EXTREMITY EDEMA: Status: ACTIVE | Noted: 2019-09-30

## 2019-09-30 PROBLEM — M79.89 SWELLING OF LEFT UPPER EXTREMITY: Status: ACTIVE | Noted: 2019-09-30

## 2019-09-30 LAB
ANION GAP SERPL CALCULATED.3IONS-SCNC: 8 MMOL/L (ref 4–13)
ANISOCYTOSIS BLD QL SMEAR: PRESENT
BASOPHILS # BLD MANUAL: 0 THOUSAND/UL (ref 0–0.1)
BASOPHILS NFR MAR MANUAL: 0 % (ref 0–1)
BUN SERPL-MCNC: 24 MG/DL (ref 5–25)
CALCIUM SERPL-MCNC: 8.5 MG/DL (ref 8.3–10.1)
CHLORIDE SERPL-SCNC: 107 MMOL/L (ref 100–108)
CO2 SERPL-SCNC: 19 MMOL/L (ref 21–32)
CREAT SERPL-MCNC: 0.98 MG/DL (ref 0.6–1.3)
EOSINOPHIL # BLD MANUAL: 0 THOUSAND/UL (ref 0–0.4)
EOSINOPHIL NFR BLD MANUAL: 0 % (ref 0–6)
ERYTHROCYTE [DISTWIDTH] IN BLOOD BY AUTOMATED COUNT: 16.7 % (ref 11.6–15.1)
GFR SERPL CREATININE-BSD FRML MDRD: 62 ML/MIN/1.73SQ M
GLUCOSE SERPL-MCNC: 58 MG/DL (ref 65–140)
HCT VFR BLD AUTO: 22.9 % (ref 34.8–46.1)
HGB BLD-MCNC: 7.4 G/DL (ref 11.5–15.4)
LYMPHOCYTES # BLD AUTO: 1.45 THOUSAND/UL (ref 0.6–4.47)
LYMPHOCYTES # BLD AUTO: 13 % (ref 14–44)
MCH RBC QN AUTO: 28.2 PG (ref 26.8–34.3)
MCHC RBC AUTO-ENTMCNC: 32.3 G/DL (ref 31.4–37.4)
MCV RBC AUTO: 87 FL (ref 82–98)
METAMYELOCYTES NFR BLD MANUAL: 1 % (ref 0–1)
MONOCYTES # BLD AUTO: 0.67 THOUSAND/UL (ref 0–1.22)
MONOCYTES NFR BLD: 6 % (ref 4–12)
MYELOCYTES NFR BLD MANUAL: 1 % (ref 0–1)
NEUTROPHILS # BLD MANUAL: 8.7 THOUSAND/UL (ref 1.85–7.62)
NEUTS SEG NFR BLD AUTO: 78 % (ref 43–75)
NRBC BLD AUTO-RTO: 0 /100 WBCS
PLATELET # BLD AUTO: 151 THOUSANDS/UL (ref 149–390)
PLATELET BLD QL SMEAR: ADEQUATE
PMV BLD AUTO: 11.5 FL (ref 8.9–12.7)
POIKILOCYTOSIS BLD QL SMEAR: PRESENT
POTASSIUM SERPL-SCNC: 5.5 MMOL/L (ref 3.5–5.3)
RBC # BLD AUTO: 2.62 MILLION/UL (ref 3.81–5.12)
RBC MORPH BLD: PRESENT
SODIUM SERPL-SCNC: 134 MMOL/L (ref 136–145)
VARIANT LYMPHS # BLD AUTO: 1 %
WBC # BLD AUTO: 11.16 THOUSAND/UL (ref 4.31–10.16)

## 2019-09-30 PROCEDURE — 97163 PT EVAL HIGH COMPLEX 45 MIN: CPT

## 2019-09-30 PROCEDURE — G8979 MOBILITY GOAL STATUS: HCPCS

## 2019-09-30 PROCEDURE — G8987 SELF CARE CURRENT STATUS: HCPCS

## 2019-09-30 PROCEDURE — G8988 SELF CARE GOAL STATUS: HCPCS

## 2019-09-30 PROCEDURE — G8978 MOBILITY CURRENT STATUS: HCPCS

## 2019-09-30 PROCEDURE — 85027 COMPLETE CBC AUTOMATED: CPT | Performed by: PHYSICIAN ASSISTANT

## 2019-09-30 PROCEDURE — 71046 X-RAY EXAM CHEST 2 VIEWS: CPT

## 2019-09-30 PROCEDURE — 80048 BASIC METABOLIC PNL TOTAL CA: CPT | Performed by: PHYSICIAN ASSISTANT

## 2019-09-30 PROCEDURE — 99225 PR SBSQ OBSERVATION CARE/DAY 25 MINUTES: CPT | Performed by: PHYSICIAN ASSISTANT

## 2019-09-30 PROCEDURE — 97167 OT EVAL HIGH COMPLEX 60 MIN: CPT

## 2019-09-30 PROCEDURE — 92610 EVALUATE SWALLOWING FUNCTION: CPT

## 2019-09-30 PROCEDURE — 85007 BL SMEAR W/DIFF WBC COUNT: CPT | Performed by: PHYSICIAN ASSISTANT

## 2019-09-30 RX ORDER — SACCHAROMYCES BOULARDII 250 MG
250 CAPSULE ORAL 2 TIMES DAILY
Status: DISCONTINUED | OUTPATIENT
Start: 2019-09-30 | End: 2019-10-08 | Stop reason: HOSPADM

## 2019-09-30 RX ORDER — SODIUM POLYSTYRENE SULFONATE 4.1 MEQ/G
15 POWDER, FOR SUSPENSION ORAL; RECTAL ONCE
Status: COMPLETED | OUTPATIENT
Start: 2019-09-30 | End: 2019-09-30

## 2019-09-30 RX ADMIN — VENLAFAXINE 75 MG: 37.5 TABLET ORAL at 17:02

## 2019-09-30 RX ADMIN — PANTOPRAZOLE SODIUM 40 MG: 40 TABLET, DELAYED RELEASE ORAL at 05:24

## 2019-09-30 RX ADMIN — MELATONIN 1000 UNITS: at 08:33

## 2019-09-30 RX ADMIN — VENLAFAXINE 75 MG: 37.5 TABLET ORAL at 00:43

## 2019-09-30 RX ADMIN — ARIPIPRAZOLE 10 MG: 10 TABLET ORAL at 08:34

## 2019-09-30 RX ADMIN — VENLAFAXINE 75 MG: 37.5 TABLET ORAL at 08:34

## 2019-09-30 RX ADMIN — ENOXAPARIN SODIUM 90 MG: 100 INJECTION SUBCUTANEOUS at 22:28

## 2019-09-30 RX ADMIN — Medication 250 MG: at 11:10

## 2019-09-30 RX ADMIN — CEFAZOLIN SODIUM 2000 MG: 2 SOLUTION INTRAVENOUS at 22:29

## 2019-09-30 RX ADMIN — SODIUM POLYSTYRENE SULFONATE 15 G: 1 POWDER ORAL; RECTAL at 11:47

## 2019-09-30 RX ADMIN — ASPIRIN 81 MG: 81 TABLET, COATED ORAL at 08:33

## 2019-09-30 RX ADMIN — ENOXAPARIN SODIUM 90 MG: 100 INJECTION SUBCUTANEOUS at 08:37

## 2019-09-30 RX ADMIN — LORAZEPAM 1 MG: 1 TABLET ORAL at 18:20

## 2019-09-30 RX ADMIN — VENLAFAXINE 75 MG: 37.5 TABLET ORAL at 22:30

## 2019-09-30 RX ADMIN — CEFAZOLIN SODIUM 2000 MG: 2 SOLUTION INTRAVENOUS at 15:08

## 2019-09-30 RX ADMIN — CEFAZOLIN SODIUM 2000 MG: 2 SOLUTION INTRAVENOUS at 07:21

## 2019-09-30 RX ADMIN — Medication 250 MG: at 22:28

## 2019-09-30 NOTE — SPEECH THERAPY NOTE
Speech Language/Pathology  Speech/Language Pathology  Assessment    Patient Name: Celia Stark  Today's Date: 9/30/2019     Problem List  Principal Problem:    Generalized weakness  Active Problems:    Benign essential hypertension    Endometrial cancer ([facility] )    Acute deep vein thrombosis (DVT) of proximal vein of lower extremity ([facility] )    Anemia due to chemotherapy    MSSA bacteremia    Past Medical History  Past Medical History:   Diagnosis Date    Anemia     Chemotherapy follow-up examination     Depression     Diabetes mellitus [facility] )     Endometrial cancer ([facility] ) 12/2017    Hyperglycemia     vx type 2 dm -- last assessed 4/1/14; resolved 11/7/17    Hyperlipidemia     Hypertension     Obesity     last assessed 10/14/17; resolved 11/7/17     Past Surgical History  Past Surgical History:   Procedure Laterality Date    ABDOMINAL SURGERY      GASTRIC BYPASS    CHOLECYSTECTOMY      at the time of gastric bypass    COLONOSCOPY      CT NEEDLE BIOPSY LYMPH NODE  7/8/2019    GASTRIC BYPASS      HYSTERECTOMY Bilateral     total abdominal with salpingo-oophorectomy    IR PICC LINE  9/27/2019    IR PORT PLACEMENT  7/26/2019    IR PORT REMOVAL  9/20/2019    IR TUBE PLACEMENT NEPHROSTOMY  7/26/2019    OOPHORECTOMY Bilateral     NE LAP, RADICAL HYST W/ TUBE&OV, NODE BX N/A 12/19/2017    Procedure: HYSTERECTOMY LAPAROSCOPIC TOTAL ([address]) W/ ROBOTICS; BILATERAL SALPINGOOPHERECTOMY; LYMPH NODE DISSECTION; lysis of adhesions;  Surgeon: Shasta Dash MD;  [facility];  Service: Gynecology Oncology    NE LAP,DIAGNOSTIC ABDOMEN N/A 12/19/2017    Procedure: LAPAROSCOPY DIAGNOSTIC;  Surgeon: Shasta Dash MD;  [facility];  Service: Gynecology Oncology    TONSILLECTOMY      US GUIDED BREAST BIOPSY RIGHT COMPLETE Right 6/28/2019   60 y o  female with PMH significant for MSSA bacteremia receiving 6 weeks of abx, recurrent endometrial cancer, acute DVT, anemia 2/2 chemo, HTN who presents with generalized weakness after being discharged earlier today  Patient states she was doing well with PT throughout hospitalization and admits to refusing rehab placement  When she got home from the hospital this afternoon patient states she was unable to lift her right leg in order to go up 2 steps into her house  She presented to [facility] ED with transfer to [facility] for continuity of care  CXR- not completed yet   Bedside Swallow Evaluation:    Summary:  Pt presents w/ fairly functional swallow w/ min dry coughing & a gag-? Able esophageal component  Pt does have c/o early satiety  She desires a diet that she can choose more material from at this time  Will upgrade & f/u for strategies below & tolerance  Recommendations:  Diet: regular, extra sauce & gravy  Liquid:thin  Meds: as tolerated  Supervision: intermittent  Positioning:Upright  Strategies: Pt to take PO/Meds only when fully alert and upright  Oral care:frequently  Aspiration precautions  Reflux precautions    Therapy Prognosis: favorable  Prognosis considerations: pt able ot carry over  Frequency: 1-2x as able      Consider consult w/:  Rehab  Nutrition      Reason for consult:  R/o aspiration  Determine safest and least restrictive diet  Precautions:  fall    Current diet:  Level 3 dysphagia advanced, thin  Premorbid diet[de-identified]  Level 3 dysphagia advanced   Previous VBS:  -  O2 requirement:  nc  Voice/Speech:  Clear, intelligible  Social:  home  Follows commands:                        yes  Cognitive Status:  A&Ox3  [facility]:  Dentition:natural  Labial strength and ROM: wfl  Lingual strength and ROM: wfl  Mandibular strength and ROM: wfl  Velum: wfl  Secretion management: wfl   Volitional cough: wfl  Volitional swallow: wfl    Items administered:  Puree, soft solid, hard solid, thin by straw    Pt stated she was full very quickly  Oral stage:  Lip closure:wfl  Mastication: min reduced but adequate,effective   Bolus formation:wfl  Bolus control:wfl  Transfer:farily timely  Oral residue:none noted    Pharyngeal stage:  Swallow promptness:+  Laryngeal rise: wfl  Wet voice: none noted   Throat clear:none noted  Cough: noted dry cough followng a gag   Secondary swallows: throughout w/ water   Audible swallows: No overt s/s aspiration    Esophageal stage:  Pt w a gag, almost belch then cough  Aspiration precautions posted    Results d/w:  Pt, nursing  Goal(s):  Pt will tolerate least restrictive diet w/out s/s aspiration or oral/pharyngeal difficulties

## 2019-09-30 NOTE — H&P
H&P- Bowman Plzaid Pulcini 1955, 60 y o  female MRN: [id number]  Unit/Bed#: [facility] 910-01 Encounter: [id number]  Primary Care Provider: Kadie Aviles DO   Date and time admitted to hospital: 9/29/2019 10:04 PM    * Generalized weakness  Assessment & Plan  · Discharged from [facility] 9/29 with recommended home PT 3-5x/week however represented to [address] [place] [address] ED after being unable to lift her right leg to go up 2 stairs at home   · Transferred to [facility] for continuity of care   · Likely 2/2 physical decondition in the setting of recurrent endometrial cancer   · Monitor hemoglobin and transfuse prn   · Fall precautions  · PT/OT consults placed   · Patient had refused rehab on prior admission but is now agreeable, case management consult placed     Endometrial cancer [facility])  Assessment & Plan  · Appreciate Gynecologic Oncology evaluation on recent admission   · She received Taxol/carboplatin and received last chemotherapy 09/09/2019  · Received granix 08/31/2019, also Neulasta 09/09/2019 and 09/16/2019  · Chemotherapy to be on hold for now given acute infection  · CT CAP - shows some improvement in areas of malignancy, R>L pleural effusion, nodules in lung which can be infectious/inflammatory       MSSA bacteremia  Assessment & Plan  · In the setting of port a cath and was admitted 9/19-9/29 with sepsis   · Catheter removed on 9/20 now has R PICC placed 9/27  · Repeat cultures have been negative for > 5 days   · TRINH without vegetation  · ID recommended 6 weeks of abx - on day #10 of abx, Cefaxolin day #8    Acute deep vein thrombosis (DVT) of proximal vein of lower extremity ([facility] [address] )  Assessment & Plan  · GYN Onc team switched from Eliquis to Lovenox BID permanently    Anemia due to chemotherapy  Assessment & Plan  · Hemoglobin stable at 8 0  · Required blood transfusions on recent admission, will obtain consent   · Monitor CBC daily and transfuse prn     Benign essential hypertension  Assessment & Plan  · BP acceptbale, monitor routinely   · Continue home medical management     VTE Prophylaxis: Enoxaparin (Lovenox)  / sequential compression device   Code Status: Full  POLST: POLST form is on file already (pre-hospital)    Anticipated Length of Stay:  Patient will be admitted on an Observation basis with an anticipated length of stay of  < 2 midnights  Justification for Hospital Stay: PT/OT, rehab placement     Total Time for Visit, including Counseling / Coordination of Care: 20 minutes  Greater than 50% of this total time spent on direct patient counseling and coordination of care  Chief Complaint:   Unable to go up 2 stairs     History of Present Illness:    Alejandro Cunningham is a 60 y o  female with PMH significant for MSSA bacteremia receiving 6 weeks of abx, recurrent endometrial cancer, acute DVT, anemia 2/2 chemo, HTN who presents with generalized weakness after being discharged earlier today  Patient states she was doing well with PT throughout hospitalization and admits to refusing rehab placement  When she got home from the hospital this afternoon patient states she was unable to lift her right leg in order to go up 2 steps into her house  She presented to [facility] ED with transfer to [facility] for continuity of care  At the time of my evaluation, patient is resting comfortably in bed  She offers no complaints  She specifically denies lightheadedness, dizziness, chest pain, shortness of breath  However, throughout exam patient states she has difficulty taking deep breaths  States she was on oxygen while in the hospital  O2 sat currently stable on RA, will check routine CXR  Review of Systems:    Review of Systems   Constitutional: Positive for fatigue  Negative for chills, diaphoresis and fever  HENT: Positive for trouble swallowing (improving )  Respiratory: Positive for cough (improving ) and shortness of breath (with exertion )  Negative for chest tightness and wheezing      Cardiovascular: Negative for chest pain, palpitations and leg swelling  Gastrointestinal: Positive for diarrhea  Negative for abdominal pain, constipation, nausea and vomiting  Neurological: Positive for weakness (right leg )  Negative for dizziness, light-headedness and headaches  Past Medical and Surgical History:     Past Medical History:   Diagnosis Date    Anemia     Chemotherapy follow-up examination     Depression     Diabetes mellitus ([facility] [address] )     Endometrial cancer ([facility] [address] ) 12/2017    Hyperglycemia     vx type 2 dm -- last assessed 4/1/14; resolved 11/7/17    Hyperlipidemia     Hypertension     Obesity     last assessed 10/14/17; resolved 11/7/17       Past Surgical History:   Procedure Laterality Date    ABDOMINAL SURGERY      GASTRIC BYPASS    CHOLECYSTECTOMY      at the time of gastric bypass    COLONOSCOPY      CT NEEDLE BIOPSY LYMPH NODE  7/8/2019    GASTRIC BYPASS      HYSTERECTOMY Bilateral     total abdominal with salpingo-oophorectomy    IR PICC LINE  9/27/2019    IR PORT PLACEMENT  7/26/2019    IR PORT REMOVAL  9/20/2019    IR TUBE PLACEMENT NEPHROSTOMY  7/26/2019    OOPHORECTOMY Bilateral     FL LAP, RADICAL HYST W/ TUBE&OV, NODE BX N/A 12/19/2017    Procedure: HYSTERECTOMY LAPAROSCOPIC TOTAL ([address]) W/ ROBOTICS; BILATERAL SALPINGOOPHERECTOMY; LYMPH NODE DISSECTION; lysis of adhesions;  Surgeon: Clover Pollard MD;  Location: [facility];  Service: Gynecology Oncology    FL LAP,DIAGNOSTIC ABDOMEN N/A 12/19/2017    Procedure: LAPAROSCOPY DIAGNOSTIC;  Surgeon: Clover Pollard MD;  [facility]: [facility];  Service: Gynecology Oncology    TONSILLECTOMY      US GUIDED BREAST BIOPSY RIGHT COMPLETE Right 6/28/2019       Meds/Allergies:    Prior to Admission medications    Medication Sig Start Date End Date Taking?  Authorizing Provider   ARIPiprazole (ABILIFY) 10 mg tablet Take 10 mg by mouth daily   Yes Historical Provider, MD   aspirin (ECOTRIN LOW STRENGTH) 81 mg EC tablet Take 81 mg by mouth daily   Yes Historical Provider, MD   benzonatate (TESSALON) 200 MG capsule Take 1 capsule (200 mg total) by mouth 3 (three) times a day as needed for cough 9/29/19  Yes Parth Egan MD   ceFAZolin (ANCEF) 2000 mg IVPB Infuse 2,000 mg into a venous catheter every 8 (eight) hours 9/29/19 11/4/19 Yes Parth Egan MD   cholecalciferol (VITAMIN D3) 1,000 units tablet Take 1,000 Units by mouth daily   Yes Historical Provider, MD   enoxaparin (LOVENOX) 100 mg/mL Inject 0 9 mL (90 mg total) under the skin every 12 (twelve) hours 9/23/19 10/23/19 Yes Casie Arenas MD   Ferrous Sulfate (IRON) 28 MG TABS Take by mouth   Yes Historical Provider, MD   gemfibrozil (LOPID) 600 mg tablet TAKE 1 TABLET BY MOUTH DAILY 7/9/19  Yes Kadie Aviles DO   LORazepam (ATIVAN) 1 mg tablet Take 1 tablet (1 mg total) by mouth every 6 (six) hours as needed for anxiety (or nausea) 7/17/19  Yes RAFITA Cuello   mupirocin (BACTROBAN) 2 % ointment Apply topically 3 (three) times a day 8/28/19  Yes Kadie Aviles DO   MYRBETRIQ 50 MG TB24 TAKE 1 TABLET DAILY 11/26/18  Yes Blane Camara MD   omeprazole (PriLOSEC) 20 mg delayed release capsule Take 20 mg by mouth daily   Yes Historical Provider, MD   ondansetron (ZOFRAN) 8 mg tablet Take 1 tablet (8 mg total) by mouth every 8 (eight) hours as needed for nausea or vomiting 7/17/19  Yes RAFITA Chan   polyethylene glycol (MIRALAX) 17 g packet Take 17 g by mouth daily 9/30/19  Yes Parth Egan MD   venlafaxine (EFFEXOR) 75 mg tablet Take 75 mg by mouth 3 (three) times a day     Yes Historical Provider, MD   CRANIAL PROSTHESIS, RX, Please provide patient with extracranial prosthesis while undergoing active chemotherapy treatment   9/19/19   RAFITA Cuello   sodium chloride, PF, 0 9 % 10 mL by Intracatheter route daily for 30 days 7/26/19 9/4/19  Syble Sever, MD   dexmethylphenidate (FOCALIN XR) 10 MG 24 hr capsule Take 10 mg by mouth daily  9/29/19  Historical Provider, MD lactulose 20 g/30 mL Take 15 mL (10 g total) by mouth 3 (three) times a day for 22 days 7/24/19 9/29/19  Isha Barber DO   oxybutynin (DITROPAN-XL) 10 MG 24 hr tablet TAKE 1 TABLET BY MOUTH EVERY DAY 11/23/18 9/29/19  Paradise Mcfarlane PA-C   quinapril (ACCUPRIL) 5 mg tablet TAKE 1 TABLET BY MOUTH DAILY AT BEDTIME 6/24/19 9/29/19  Sandra Marin DO   rivaroxaban (XARELTO) 20 mg tablet Take 1 tablet (20 mg total) by mouth daily with breakfast 6/3/19 9/29/19  Sandra Marin DO     I have reviewed home medications with patient personally  Allergies:    Allergies   Allergen Reactions    Cephalosporins Rash     Which Cephalosporin reaction was to not specified; however, has tolerated Amoxicillin, Cefazolin, and Cefepime       Social History:     Marital Status: Single   Occupation:   Patient Pre-hospital Living Situation:   Patient Pre-hospital Level of Mobility:   Patient Pre-hospital Diet Restrictions:   Substance Use History:   Social History     Substance and Sexual Activity   Alcohol Use Not Currently     Social History     Tobacco Use   Smoking Status Never Smoker   Smokeless Tobacco Never Used     Social History     Substance and Sexual Activity   Drug Use No       Family History:    Family History   Problem Relation Age of Onset    Other Mother         dyslipidemia    Ovarian cancer Mother 48    Lymphoma Father     Bone cancer Maternal Grandfather     No Known Problems Sister     No Known Problems Maternal Grandmother     No Known Problems Paternal Grandmother     No Known Problems Paternal Grandfather     No Known Problems Maternal Aunt     No Known Problems Maternal Aunt     No Known Problems Maternal Aunt     No Known Problems Maternal Aunt     No Known Problems Paternal Aunt     No Known Problems Paternal Aunt     No Known Problems Paternal Aunt     No Known Problems Paternal Aunt     No Known Problems Brother     No Known Problems Brother        Physical Exam:     Vitals: Physical Exam   Constitutional: She is oriented to person, place, and time  She appears well-developed and well-nourished  No distress  Cardiovascular: Normal rate, regular rhythm, normal heart sounds and intact distal pulses  No murmur heard  Pulmonary/Chest: Effort normal  No stridor  No respiratory distress  She has decreased breath sounds  She has no wheezes  She has no rales  Poor inspiratory effort   Abdominal: Soft  Bowel sounds are normal  She exhibits no distension  There is no tenderness  There is no guarding  Musculoskeletal: She exhibits no edema  Neurological: She is alert and oriented to person, place, and time  Skin: Skin is warm and dry  She is not diaphoretic  Additional Data:     Lab Results: I have personally reviewed pertinent reports  Results from last 7 days   Lab Units 09/29/19  1252 09/29/19  0619 09/28/19  0930  09/26/19  0508   WBC Thousand/uL 15 72* 14 56* 13 77*   < > 16 22*   HEMOGLOBIN g/dL 8 0* 9 6* 6 2*   < > 6 8*   HEMATOCRIT % 24 5* 29 1* 19 9*   < > 20 8*   PLATELETS Thousands/uL 151 155 157   < > 205   BANDS PCT %  --   --   --   --  3   NEUTROS PCT %  --   --  76*   < >  --    LYMPHS PCT %  --   --  8*   < >  --    LYMPHO PCT %  --  4*  --   --  5*   MONOS PCT %  --   --  10   < >  --    MONO PCT %  --  4  --   --  8   EOS PCT %  --  0 0   < > 0    < > = values in this interval not displayed  Results from last 7 days   Lab Units 09/29/19  0619 09/26/19  0508   SODIUM mmol/L 132*   < > 130*   POTASSIUM mmol/L 5 5*   < > 4 7   CHLORIDE mmol/L 106   < > 101   CO2 mmol/L 18*   < > 21   BUN mg/dL 24   < > 21   CREATININE mg/dL 0 89   < > 0 85   ANION GAP mmol/L 8   < > 8   CALCIUM mg/dL 8 7   < > 8 2*   ALBUMIN g/dL  --   --  1 9*   TOTAL BILIRUBIN mg/dL  --   --  0 32   ALK PHOS U/L  --   --  95   ALT U/L  --   --  25   AST U/L  --   --  36   GLUCOSE RANDOM mg/dL 74   < > 88    < > = values in this interval not displayed                         Imaging: I have personally reviewed pertinent reports  No orders to display       EKG, Pathology, and Other Studies Reviewed on Admission:   · EKG: n/a    Allscripts / Epic Records Reviewed: Yes     ** Please Note: This note has been constructed using a voice recognition system   **

## 2019-09-30 NOTE — ASSESSMENT & PLAN NOTE
· Pt reports to chronic b/l LE edema    Previously was on lasix but this was held by PCP given GI losses (was having diarrhea associated with chemo)  · Would recommend to elevate extremities, compression stockings

## 2019-09-30 NOTE — PLAN OF CARE
Problem: Prexisting or High Potential for Compromised Skin Integrity  Goal: Skin integrity is maintained or improved  Description  INTERVENTIONS:  - Identify patients at risk for skin breakdown  - Assess and monitor skin integrity  - Assess and monitor nutrition and hydration status  - Monitor labs   - Assess for incontinence   - Turn and reposition patient  - Assist with mobility/ambulation  - Relieve pressure over bony prominences  - Avoid friction and shearing  - Provide appropriate hygiene as needed including keeping skin clean and dry  - Evaluate need for skin moisturizer/barrier cream  - Collaborate with interdisciplinary team   - Patient/family teaching  - Consider wound care consult   Outcome: Progressing     Problem: Potential for Falls  Goal: Patient will remain free of falls  Description  INTERVENTIONS:  - Assess patient frequently for physical needs  -  Identify cognitive and physical deficits and behaviors that affect risk of falls  -  [place] fall precautions as indicated by assessment   - Educate patient/family on patient safety including physical limitations  - Instruct patient to call for assistance with activity based on assessment  - Modify environment to reduce risk of injury  - Consider OT/PT consult to assist with strengthening/mobility  Outcome: Progressing     Problem: Nutrition/Hydration-ADULT  Goal: Nutrient/Hydration intake appropriate for improving, restoring or maintaining nutritional needs  Description  Monitor and assess patient's nutrition/hydration status for malnutrition  Collaborate with interdisciplinary team and initiate plan and interventions as ordered  Monitor patient's weight and dietary intake as ordered or per policy  Utilize nutrition screening tool and intervene as necessary  Determine patient's food preferences and provide high-protein, high-caloric foods as appropriate       INTERVENTIONS:  - Monitor oral intake, urinary output, labs, and treatment plans  - Assess nutrition and hydration status and recommend course of action  - Evaluate amount of meals eaten  - Assist patient with eating if necessary   - Allow adequate time for meals  - Recommend/ encourage appropriate diets, oral nutritional supplements, and vitamin/mineral supplements  - Order, calculate, and assess calorie counts as needed  - Recommend, monitor, and adjust tube feedings and TPN/PPN based on assessed needs  - Assess need for intravenous fluids  - Provide specific nutrition/hydration education as appropriate  - Include patient/family/caregiver in decisions related to nutrition  Outcome: Progressing     Problem: PAIN - ADULT  Goal: Verbalizes/displays adequate comfort level or baseline comfort level  Description  Interventions:  - Encourage patient to monitor pain and request assistance  - Assess pain using appropriate pain scale  - Administer analgesics based on type and severity of pain and evaluate response  - Implement non-pharmacological measures as appropriate and evaluate response  - Consider cultural and social influences on pain and pain management  - Notify physician/advanced practitioner if interventions unsuccessful or patient reports new pain  Outcome: Progressing     Problem: INFECTION - ADULT  Goal: Absence or prevention of progression during hospitalization  Description  INTERVENTIONS:  - Assess and monitor for signs and symptoms of infection  - Monitor lab/diagnostic results  - Monitor all insertion sites, i e  indwelling lines, tubes, and drains  - Monitor endotracheal if appropriate and nasal secretions for changes in amount and color  - [place] appropriate cooling/warming therapies per order  - Administer medications as ordered  - Instruct and encourage patient and family to use good hand hygiene technique  - Identify and instruct in appropriate isolation precautions for identified infection/condition  Outcome: Progressing     Problem: SAFETY ADULT  Goal: Maintain or return to baseline ADL function  Description  INTERVENTIONS:  -  Assess patient's ability to carry out ADLs; assess patient's baseline for ADL function and identify physical deficits which impact ability to perform ADLs (bathing, care of mouth/teeth, toileting, grooming, dressing, etc )  - Assess/evaluate cause of self-care deficits   - Assess range of motion  - Assess patient's mobility; develop plan if impaired  - Assess patient's need for assistive devices and provide as appropriate  - Encourage maximum independence but intervene and supervise when necessary  - Involve family in performance of ADLs  - Assess for home care needs following discharge   - Consider OT consult to assist with ADL evaluation and planning for discharge  - Provide patient education as appropriate  Outcome: Progressing  Goal: Maintain or return mobility status to optimal level  Description  INTERVENTIONS:  - Assess patient's baseline mobility status (ambulation, transfers, stairs, etc )    - Identify cognitive and physical deficits and behaviors that affect mobility  - Identify mobility aids required to assist with transfers and/or ambulation (gait belt, sit-to-stand, lift, walker, cane, etc )  - [place] fall precautions as indicated by assessment  - Record patient progress and toleration of activity level on Mobility SBAR; progress patient to next Phase/Stage  - Instruct patient to call for assistance with activity based on assessment  - Consider rehabilitation consult to assist with strengthening/weightbearing, etc   Outcome: Progressing     Problem: DISCHARGE PLANNING  Goal: Discharge to home or other facility with appropriate resources  Description  INTERVENTIONS:  - Identify barriers to discharge w/patient and caregiver  - Arrange for needed discharge resources and transportation as appropriate  - Identify discharge learning needs (meds, wound care, etc )  - Arrange for interpretive services to assist at discharge as needed  - Refer to Case Management Department for coordinating discharge planning if the patient needs post-hospital services based on physician/advanced practitioner order or complex needs related to functional status, cognitive ability, or social support system  Outcome: Progressing     Problem: INFECTION - ADULT  Goal: Absence of fever/infection during neutropenic period  Description  INTERVENTIONS:  - Monitor WBC    Outcome: Completed

## 2019-09-30 NOTE — ASSESSMENT & PLAN NOTE
Sent in medication Bactrim for 3 d, increase fluids, if not better will need a UA    · BP acceptbale, monitor routinely   · Continue home medical management

## 2019-09-30 NOTE — ASSESSMENT & PLAN NOTE
· Pt has noted LUE pain and swelling since unsuccessful attempt at PICC placement in that arm  · Check LUE duplex   · PRN pain control

## 2019-09-30 NOTE — PLAN OF CARE
Problem: OCCUPATIONAL THERAPY ADULT  Goal: Performs self-care activities at highest level of function for planned discharge setting  See evaluation for individualized goals  Description  Treatment Interventions: ADL retraining, Functional transfer training, UE strengthening/ROM, Endurance training, Patient/family training, Equipment evaluation/education, Energy conservation, Activityengagement  Equipment Recommended: Bedside commode       See flowsheet documentation for full assessment, interventions and recommendations  Note:   Limitation: Decreased ADL status, Decreased UE ROM, Decreased UE strength, Decreased endurance, Decreased self-care trans, Decreased high-level ADLs  Prognosis: Good  Assessment: Pt is a 60 y o  female who was admitted to [facility] on 9/29/2019 with Generalized weakness   Pt recently D/C'ed from [facility] 9/29/2019; upon returning home pt was unable to lift R leg to clear stairs  Pt's problem list also includes PMH of endometrial cancer, DVT, anemia, MSSA bacteremia, HTN, major depression, obesity, chemotherapy induced neutropenia, [place], acute respiratory failure  At baseline pt was completing all ADLs IND, ambulating MOD IND with quad cane, assist for IADLs, (+) driving  Pt lives with her mother in a 2  with 2+1 RAJANI; pt reports she is able to have 1st floor set up  Pt reports her mother is unable to provide physical assistance  Pt also has supportive sister (lives ~40 minutes away)  Currently pt requires MIN A for UB ADLs, MOD A for LB ADLs, and SUP-MIN A for functional mobility/transfers with RW  Pt currently reporting  LUE pain - reports she recently had PICC placed and "they had trouble sticking me " Pt currently presents with impairments in the following categories -steps to enter environment, limited home support and difficulty performing ADLS activity tolerance, endurance, standing balance/tolerance, UE strength and UE ROM   These impairments, as well as pt's fatigue, SOB, decreased caregiver support, risk for falls and home environment  limit pt's ability to safely engage in all baseline areas of occupation, includinggrooming, bathing, dressing, toileting, functional mobility/transfers and leisure activities   From OT standpoint, recommend SHORT TERM REHAB upon D/C  OT will continue to follow to address the below stated goals        OT Discharge Recommendation: Short Term Rehab  OT - OK to Discharge: Yes(to STR when medically appropriate)

## 2019-09-30 NOTE — SOCIAL WORK
CM met with the patient at bedside to review the CM role and discuss possible dc needs  At time of interview pt is AAOx4,pt lives in a two story home with her Gracie U  25 , in [place], [place]   2 RAJANI  Pt was IPTA  Pt has  DME of a cane and is ambulatory without assistance   Pt has bathroom on first floor and bedroom on second floor with 10 steps  Pt denies any history of drug/etoh abuse, mental illness or inpatient psych admissions  Pt has been admitted to [facility] in 2010  Pt does not have a POA/LW  Preferred Pharmacy: [facility]: Sera Lira, sister, [phone]  PCP: Dr Ernst Blackburn    CM reviewed d/c planning process including the following: identifying help at home, patient preference for d/c planning needs, Discharge Lounge, [facility] Meds to Bed program, availability of treatment team to discuss questions or concerns patient and/or family may have regarding understanding medications and recognizing signs and symptoms once discharged  CM also encouraged patient to follow up with all recommended appointments after discharge  Patient advised of importance for patient and family to participate in managing patients medical well being

## 2019-09-30 NOTE — SOCIAL WORK
As per pts request, referral placed to [facility]  Additional referrals placed to [address] as per pts request  [facility] and [facility] unable to accept pt at this time  [facility] asking for SNF dedra to be completed  Application completed by pt and family and faxed to [address] [place] [address] Rd will continue to monitor    A post acute care recommendation was made by your care team for STR  Discussed Freedom of Choice with patient  List of facilities given to both patient and caregiver via in person  both patient and caregiver aware the list is custom filtered for them by insurance and that [facility] post acute providers are designated

## 2019-09-30 NOTE — ASSESSMENT & PLAN NOTE
· In the setting of port a cath and was admitted 9/19-9/29 with sepsis   · Catheter removed on 9/20 now has R PICC placed 9/27  · Repeat cultures have been negative for > 5 days   · TRINH without vegetation  · ID recommended 6 weeks of abx - on day #11 of abx, Cefazolin day #9

## 2019-09-30 NOTE — ED NOTES
[facility] to transport pt to [id number] at [address], [facility]  09/29/19 2013

## 2019-09-30 NOTE — ASSESSMENT & PLAN NOTE
· In the setting of port a cath and was admitted 9/19-9/29 with sepsis   · Catheter removed on 9/20 now has R PICC placed 9/27  · Repeat cultures have been negative for > 5 days   · TRINH without vegetation  · ID recommended 6 weeks of abx - on day #10 of abx, Cefaxolin day #8

## 2019-09-30 NOTE — DISCHARGE SUMMARY
Discharge Summary - Navarro Smoker Pulcini 60 y o  female MRN: [id number]    [facility] Encounter: [id number]    Admission Date:   Admission Orders (From admission, onward)     Ordered        09/29/19 2301  Place in Observation  Once                     Admitting Diagnosis: Weakness [R53 1]    HPI:  Day Fitzpatrick is a 60 y o  female who has a past medical history significant for stage I B1 endometrial cancer diagnosed in 2017 on chemotherapy with Taxol and carboplatin  Also with history of cancer related pain, hyperlipidemia, obesity, hypertension, depression  She presented today with a complaint of fevers and generalized weakness, also with shortness of breath with any activity  She had contacted Gyn/Onc office with these complaints, and was advised to obtain CT chest/abdomen/pelvis, however patient declined this and was instructed call back with any worsening symptoms occurred  This afternoon, patient's sister had called 9 patient was febrile to 101 8 F the patient advised to present to ED for further evaluation  Admitted to diarrhea for the past 2 days, with poor p o  Intake      During ED evaluation patient found to be febrile, tachycardic, and with leukocytosis for which sepsis evaluation was initiated, patient had blood culture drawn, received cefepime/vancomycin, and 3L NS bolus  At the time of my evaluation, however, patient visibly tachypneic and admitting to shortness of breath despite supplemental oxygen  A CXR was obtained with wet revealing evidence of pulmonary vascular congestion  She otherwise complains of persistent fever and weakness  Procedures Performed: No orders of the defined types were placed in this encounter  Summary of Hospital Course: Presented with sepsis POA with fever, leukocytosis, tachycardia  Elevated procalcitonin on admission however with normal lactic acid levels, 2/2 MSSA bacteremia  Vancomycin changed to IV Ancef   Port was removed as this is a complicated line infection with blood clot and infection  She will need 6 weeks of antibiotics  She was on cefazolin and will continue with antibiotics  She was stable on 9/29, hg is stable and was able to ambulate for the nursing  Her potassium was also with in normal limits as she has persistent hypokalemia, she will go with potassium at home  She was treated with hypertension with quinapril and blood pressures are stable  She has MONROE, POA baseline approximately 0 6  Suspect in setting of sepsis, poor PO intake  Received 3 L NS in ED, she keeps having volume overload, received lasix  Improved after hydration  She was on quinapril, which was on hold before discharge  She has endometrial cancer, she was being followed closely by GYN  She was on Taxol and carboplatin and received last chemotherapy on 9/9  Granix 8/31, also neulasta on 9/9, and 9/16  Chemotherapy was held for now given acute infection  CT CAP showed improvement of malignancy, R>L pleural effusions, nodules in lung which could be inflammatory or infectious etiology as she has MSSA  She has DVT of proximal vein of the lower extremity, on eliquis due ot PE, she was switched to Lovenox BID permanently  Concern for bleeding due to the fact her hemoglobin is dropping as she is on Lovenox  Her hg is 7 5>7 0>7 0>6 2>6 3->8 0 at discharge  Close monitoring is recommended outpatient  Patient has follow up with primary doctor, gyn oncology and infectious diseases  Significant Findings, Care, Treatment and Services Provided: as above    Complications: none    Discharge Diagnosis: Sepsis POA    Resolved Problems  Date Reviewed: 9/29/2019    None          Condition at Discharge: stable         Discharge instructions/Information to patient and family:   See after visit summary for information provided to patient and family        Provisions for Follow-Up Care:  See after visit summary for information related to follow-up care and any pertinent home health orders  PCP: Ck Herrera DO    Disposition: Home    Planned Readmission: No      Discharge Statement   I spent 35 minutes discharging the patient  This time was spent on the day of discharge  I had direct contact with the patient on the day of discharge  Additional documentation is required if more than 30 minutes were spent on discharge  Discharge Medications:  See after visit summary for reconciled discharge medications provided to patient and family

## 2019-09-30 NOTE — PROGRESS NOTES
[facility]  Progress Note - Ryan Elise 1955, 60 y o  female MRN: [id number]    Unit/Bed#: [facility] 910-01 Encounter: [id number]    Primary Care Provider: Floridalma Keyes DO   Date and time admitted to hospital: 9/29/2019 10:04 PM      * Generalized weakness  Assessment & Plan  · Discharged from [facility] 9/29 with recommended home PT 3-5x/week however represented to [facility] ED after being unable to lift her right leg to go up 2 stairs at home   · Transferred to [facility] for continuity of care   · Likely 2/2 physical decondition in the setting of recurrent endometrial cancer   · Monitor hemoglobin and transfuse prn   · Fall precautions  · PT/OT consults placed - recommending rehab   · Patient had refused rehab on prior admission but is now agreeable, case management consult placed     Bilateral lower extremity edema  Assessment & Plan  · Pt reports to chronic b/l LE edema    Previously was on lasix but this was held by PCP given GI losses (was having diarrhea associated with chemo)  · Would recommend to elevate extremities, compression stockings     Swelling of left upper extremity  Assessment & Plan  · Pt has noted LUE pain and swelling since unsuccessful attempt at PICC placement in that arm  · Check LUE duplex   · PRN pain control     Hyperkalemia  Assessment & Plan  · Kayexalate  · BMP in AM    MSSA bacteremia  Assessment & Plan  · In the setting of port a cath and was admitted 9/19-9/29 with sepsis   · Catheter removed on 9/20 now has R PICC placed 9/27  · Repeat cultures have been negative for > 5 days   · TRINH without vegetation  · ID recommended 6 weeks of abx - on day #11 of abx, Cefazolin day #9    Anemia due to chemotherapy  Assessment & Plan  · Hemoglobin 8 0-->7 4 this AM   · Required blood transfusions on recent admission, attending obtained conset  · Monitor CBC daily and transfuse prn     Acute deep vein thrombosis (DVT) of proximal vein of lower extremity ([address] )  Assessment & Plan  · GYN Onc team switched from Eliquis to Lovenox BID permanently    Endometrial cancer [facility])  Assessment & Plan  · Appreciate Gynecologic Oncology evaluation on recent admission   · She received Taxol/carboplatin and received last chemotherapy 09/09/2019  · Received granix 08/31/2019, also Neulasta 09/09/2019 and 09/16/2019  · Chemotherapy to be on hold for now given acute infection  · CT CAP - shows some improvement in areas of malignancy, R>L pleural effusion, nodules in lung which can be infectious/inflammatory       Benign essential hypertension  Assessment & Plan  · BP acceptbale, monitor routinely   · Continue home medical management       VTE Pharmacologic Prophylaxis:   Pharmacologic: Enoxaparin (Lovenox)  Mechanical VTE Prophylaxis in Place: No    Patient Centered Rounds: I have performed bedside rounds with nursing staff today  Discussions with Specialists or Other Care Team Provider: RN, attending Dr Casie Llamas, [facility]     Education and Discussions with Family / Patient: patient, family at bedside    Time Spent for Care: 30 minutes  More than 50% of total time spent on counseling and coordination of care as described above  Current Length of Stay: 0 day(s)    Current Patient Status: Observation   Certification Statement: The patient, admitted on an observation basis, will now require > 2 midnight hospital stay due to needs rehab PT/OT  Not safe for discharge  Needs evaluation of LUE edema    Discharge Plan: following upper extremity duplex, needs placement for safe d/c  Code Status: Level 1 - Full Code      Subjective:   Patient reports to having weakness yesterday after discharge and she was not able to get up the stairs into her home  She had particularly hard time with RLE as the tumor is located on her right side  She does have general edema of b/l LE which she states is chronic    She notes she was previously on lasix but this was stopped by her PCP as she was having diarrhea associated with chemo   She denies numbness/tingling of extremities  She also notes swelling of the LUE after unsuccessful PICC was attempted to be placed in that extremity and associated tenderness  She has no other acute complaints tolerated breakfast      Objective:     Vitals:   Temp (24hrs), Av 8 °F (36 6 °C), Min:97 4 °F (36 3 °C), Max:98 2 °F (36 8 °C)    Temp:  [97 4 °F (36 3 °C)-98 2 °F (36 8 °C)] 97 4 °F (36 3 °C)  HR:  [81-88] 81  Resp:  [16-18] 18  BP: (113-160)/(53-79) 138/79  SpO2:  [98 %-100 %] 100 %  There is no height or weight on file to calculate BMI  Input and Output Summary (last 24 hours): Intake/Output Summary (Last 24 hours) at 2019 1341  Last data filed at 2019 1257  Gross per 24 hour   Intake 98 ml   Output 750 ml   Net -652 ml       Physical Exam:     Physical Exam   Constitutional: She is oriented to person, place, and time  No distress  HENT:   Head: Normocephalic and atraumatic  Eyes: EOM are normal  No scleral icterus  Neck: Normal range of motion  Neck supple  Cardiovascular: Normal rate and regular rhythm  Pulmonary/Chest:   Decreased  Poor effort   Abdominal: Soft  Bowel sounds are normal  She exhibits no distension  There is no tenderness  Musculoskeletal: Normal range of motion  She exhibits edema (left upper extremity, trace b/l LE) and tenderness (LUE)  Neurological: She is alert and oriented to person, place, and time  No cranial nerve deficit  Skin: Skin is warm and dry  She is not diaphoretic  Psychiatric: She has a normal mood and affect  Her behavior is normal    Vitals reviewed        Additional Data:     Labs:    Results from last 7 days   Lab Units 19  0537  19  0930  19  0508   WBC Thousand/uL 11 16*   < > 13 77*   < > 16 22*   HEMOGLOBIN g/dL 7 4*   < > 6 2*   < > 6 8*   HEMATOCRIT % 22 9*   < > 19 9*   < > 20 8*   PLATELETS Thousands/uL 151   < > 157   < > 205   BANDS PCT %  --   --   --   --  3   NEUTROS PCT %  --   -- 76*   < >  --    LYMPHS PCT %  --   --  8*   < >  --    LYMPHO PCT % 13*   < >  --   --  5*   MONOS PCT %  --   --  10   < >  --    MONO PCT % 6   < >  --   --  8   EOS PCT % 0   < > 0   < > 0    < > = values in this interval not displayed  Results from last 7 days   Lab Units 09/30/19  0537  09/26/19  0508   SODIUM mmol/L 134*   < > 130*   POTASSIUM mmol/L 5 5*   < > 4 7   CHLORIDE mmol/L 107   < > 101   CO2 mmol/L 19*   < > 21   BUN mg/dL 24   < > 21   CREATININE mg/dL 0 98   < > 0 85   ANION GAP mmol/L 8   < > 8   CALCIUM mg/dL 8 5   < > 8 2*   ALBUMIN g/dL  --   --  1 9*   TOTAL BILIRUBIN mg/dL  --   --  0 32   ALK PHOS U/L  --   --  95   ALT U/L  --   --  25   AST U/L  --   --  36   GLUCOSE RANDOM mg/dL 58*   < > 88    < > = values in this interval not displayed  * I Have Reviewed All Lab Data Listed Above  * Additional Pertinent Lab Tests Reviewed: All Labs Within Last 24 Hours Reviewed    Imaging:    Imaging Reports Reviewed Today Include: none  Imaging Personally Reviewed by Myself Includes:  none    Recent Cultures (last 7 days):     Results from last 7 days   Lab Units 09/24/19  1447 09/24/19  1433 09/23/19  1353   BLOOD CULTURE  No Growth After 5 Days  No Growth After 5 Days  No Growth After 5 Days         Last 24 Hours Medication List:     Current Facility-Administered Medications:  acetaminophen 650 mg Oral Q6H PRN Emperatriz E Held, PA-C    ARIPiprazole 10 mg Oral Daily Emperatriz E Held, PA-C    aspirin 81 mg Oral Daily Emperatriz E Held, PA-C    benzonatate 200 mg Oral TID PRN Emperatriz E Held, PA-C    ceFAZolin 2,000 mg Intravenous Q8H Emperatriz E Held, PA-C Last Rate: Stopped (09/30/19 0820)   cholecalciferol 1,000 Units Oral Daily Emperatriz E Held, PA-C    enoxaparin 90 mg Subcutaneous Q12H [address] Emperatriz E Held, PA-C    LORazepam 1 mg Oral Q6H PRN Emperatriz E Held, PA-C    pantoprazole 40 mg Oral Early Morning Emperatriz Rosas PA-C    saccharomyces boulardii 250 mg Oral BID [employer], PA-C venlafaxine 75 mg Oral TID Sree Rosas PA-C         Today, Patient Was Seen By: Maria Isabel Anderson PA-C    ** Please Note: Dictation voice to text software may have been used in the creation of this document   **

## 2019-09-30 NOTE — ASSESSMENT & PLAN NOTE
· Appreciate Gynecologic Oncology evaluation on recent admission   · She received Taxol/carboplatin and received last chemotherapy 09/09/2019  · Received granix 08/31/2019, also Neulasta 09/09/2019 and 09/16/2019  · Chemotherapy to be on hold for now given acute infection  · CT CAP - shows some improvement in areas of malignancy, R>L pleural effusion, nodules in lung which can be infectious/inflammatory

## 2019-09-30 NOTE — ASSESSMENT & PLAN NOTE
· Discharged from [facility] 9/29 with recommended home PT 3-5x/week however represented to [facility] ED after being unable to lift her right leg to go up 2 stairs at home   · Transferred to [facility] for continuity of care   · Likely 2/2 physical decondition in the setting of recurrent endometrial cancer   · Monitor hemoglobin and transfuse prn   · Fall precautions  · PT/OT consults placed - recommending rehab   · Patient had refused rehab on prior admission but is now agreeable, case management consult placed

## 2019-09-30 NOTE — ASSESSMENT & PLAN NOTE
· Discharged from [facility] 9/29 with recommended home PT 3-5x/week however represented to [facility] ED after being unable to lift her right leg to go up 2 stairs at home   · Transferred to [facility] for continuity of care   · Likely 2/2 physical decondition in the setting of recurrent endometrial cancer   · Monitor hemoglobin and transfuse prn   · Fall precautions  · PT/OT consults placed   · Patient had refused rehab on prior admission but is now agreeable, case management consult placed

## 2019-09-30 NOTE — OCCUPATIONAL THERAPY NOTE
[address]  Evaluation     Patient Name: Voncille Saint  Today's Date: 9/30/2019  Problem List  Principal Problem:    Generalized weakness  Active Problems:    Benign essential hypertension    Endometrial cancer ([facility] [address] )    Acute deep vein thrombosis (DVT) of proximal vein of lower extremity (HCC)    Anemia due to chemotherapy    MSSA bacteremia    Hyperkalemia    Swelling of left upper extremity    Bilateral lower extremity edema    Past Medical History  Past Medical History:   Diagnosis Date    Anemia     Chemotherapy follow-up examination     Depression     Diabetes mellitus ([facility] [address] )     Endometrial cancer ([facility] [address] ) 12/2017    Hyperglycemia     vx type 2 dm -- last assessed 4/1/14; resolved 11/7/17    Hyperlipidemia     Hypertension     Obesity     last assessed 10/14/17; resolved 11/7/17     Past Surgical History  Past Surgical History:   Procedure Laterality Date    ABDOMINAL SURGERY      GASTRIC BYPASS    CHOLECYSTECTOMY      at the time of gastric bypass    COLONOSCOPY      CT NEEDLE BIOPSY LYMPH NODE  7/8/2019    GASTRIC BYPASS      HYSTERECTOMY Bilateral     total abdominal with salpingo-oophorectomy    IR PICC LINE  9/27/2019    IR PORT PLACEMENT  7/26/2019    IR PORT REMOVAL  9/20/2019    IR TUBE PLACEMENT NEPHROSTOMY  7/26/2019    OOPHORECTOMY Bilateral     AR LAP, RADICAL HYST W/ TUBE&OV, NODE BX N/A 12/19/2017    Procedure: HYSTERECTOMY LAPAROSCOPIC TOTAL ([address]) W/ ROBOTICS; BILATERAL SALPINGOOPHERECTOMY; LYMPH NODE DISSECTION; lysis of adhesions;  Surgeon: Kecia Silver MD;  [facility]: [facility];  Service: Gynecology Oncology    AR LAP,DIAGNOSTIC ABDOMEN N/A 12/19/2017    Procedure: LAPAROSCOPY DIAGNOSTIC;  Surgeon: Kecia Silver MD;  [facility];  Service: Gynecology Oncology    TONSILLECTOMY      US GUIDED BREAST BIOPSY RIGHT COMPLETE Right 6/28/2019 09/30/19 1136   Note Type   Note type Eval only   Restrictions/Precautions   Weight Bearing Precautions Per Order No   Other Precautions Bed Alarm; Fall Risk;Pain   Pain Assessment   Pain Assessment 0-10   Pain Score 4   Pain Type Acute pain   Pain Location Arm   Pain Orientation Left   Hospital Pain Intervention(s) Repositioned; Ambulation/increased activity; Emotional support   Response to Interventions tolerated   Home Living   Type of [facility] Two level; Able to live on main level with bedroom/bathroom  (2+1 RAJANI, can have 1st floor set up)   Bathroom Shower/Tub Walk-in shower   [address] cane;Walker  (& rollator)   Prior Function   Level of Will Independent with ADLs and functional mobility   Lives With Family  (mother)   Receives Help From Family   ADL Assistance Independent   IADLs Needs assistance   Falls in the last 6 months 1 to 4  (reports 1)   Vocational Part time employment   Comments pt reports her mother is unable to physically assist her  Supportive sister (lives 40 minutes away)   Lifestyle   Autonomy At baseline pt was completing all ADLs IND, ambulating MOD IND with quad cane, assist for IADLs, (+) driving  Reciprocal Relationships supportive mother and sister   Service to Others was working PT, currently not working   Intrinsic Gratification spending time with family, watching TV   Psychosocial   Psychosocial (WDL) WDL   Subjective   Subjective "I feel weaker than usual"   ADL   Eating Assistance 5  Supervision/Setup   Grooming Assistance 4  Minimal Assistance   UB Bathing Assistance 4  Minimal Assistance   LB [address] 3  Moderate Assistance   [address] 4  Minimal Assistance   LB Dressing Assistance 3  Moderate Assistance   LB Dressing Deficit Don/doff R sock; Don/doff L sock;Steadying;Supervision/safety; Increased time to complete   Toileting Assistance  4  Minimal Assistance   Bed Mobility   Supine to Sit 4  Minimal assistance   Additional items Assist x 2;HOB elevated; Increased time required   Sit to Supine 3  Moderate assistance   Additional items Assist x 1; Increased time required;LE management   Transfers   Sit to Stand 4  Minimal assistance   Additional items Assist x 1; Increased time required;Verbal cues   Stand to Sit 5  Supervision   Additional items Assist x 1   Additional Comments RW  SUP for sit to stand transfer from recliner chair with armrests  MIN A for transfer from standard chair   Functional Mobility   Functional Mobility 4  Minimal assistance   Additional Comments short household distances   Additional items Rolling walker   Balance   Static Sitting Good   Dynamic Sitting Fair +   Static Standing Fair   Dynamic Standing Fair -   Activity Tolerance   Activity Tolerance Patient limited by fatigue;Patient limited by pain   Medical Staff Made Aware PT   Nurse Made Aware Spoke to RN - pt appropriate to be seen   RUE Assessment   RUE Assessment X  (shoulder flexion AROM ~75 degrees, elbow/ 3+/5)   LUE Assessment   LUE Assessment X  (shoulder flexion AROM ~75 degrees, elbow/ 3+/5)   Hand Function   Gross Motor Coordination Functional   Fine Motor Coordination Functional   Vision-Basic Assessment   Current Vision Wears glasses all the time   Cognition   Overall Cognitive Status WFL   Arousal/Participation Alert; Cooperative   Attention Within functional limits   Orientation Level Oriented X4   Memory Within functional limits   Following Commands Follows one step commands with increased time or repetition  (likely 2' fatigue)   Assessment   Limitation Decreased ADL status; Decreased UE ROM; Decreased UE strength;Decreased endurance;Decreased self-care trans;Decreased high-level ADLs   Prognosis Good   Assessment Pt is a 60 y o  female who was admitted to [facility] on 9/29/2019 with Generalized weakness   Pt recently D/C'ed from [facility] 9/29/[facility]; upon returning home pt was unable to lift R leg to clear stairs   Pt's problem list also includes PMH of endometrial cancer, DVT, anemia, MSSA bacteremia, HTN, major depression, obesity, chemotherapy induced neutropenia, [place], acute respiratory failure  At baseline pt was completing all ADLs IND, ambulating MOD IND with quad cane, assist for IADLs, (+) driving  Pt lives with her mother in a 2 SH with 2+1 RAJANI; pt reports she is able to have 1st floor set up  Pt reports her mother is unable to provide physical assistance  Pt also has supportive sister (lives ~40 minutes away)  Currently pt requires MIN A for UB ADLs, MOD A for LB ADLs, and SUP-MIN A for functional mobility/transfers with RW  Pt currently reporting  LUE pain - reports she recently had PICC placed and "they had trouble sticking me;" medical team aware  Pt currently presents with impairments in the following categories -steps to enter environment, limited home support and difficulty performing ADLS activity tolerance, endurance, standing balance/tolerance, UE strength and UE ROM  These impairments, as well as pt's fatigue, SOB, decreased caregiver support, risk for falls and home environment  limit pt's ability to safely engage in all baseline areas of occupation, includinggrooming, bathing, dressing, toileting, functional mobility/transfers and leisure activities   From OT standpoint, recommend SHORT TERM REHAB upon D/C  OT will continue to follow to address the below stated goals  Goals   Patient Goals to get stronger   LTG Time Frame 10-14   Long Term Goal #1 see goals below   Plan   Treatment Interventions ADL retraining;Functional transfer training;UE strengthening/ROM; Endurance training;Patient/family training;Equipment evaluation/education; Energy conservation; Activityengagement   Goal Expiration Date 10/14/19   OT Frequency 3-5x/wk   Recommendation   OT Discharge Recommendation Short Term Rehab   Equipment Recommended Bedside commode   OT - OK to Discharge Yes  (to STR when medically appropriate)   Barthel Index   Feeding 10   Bathing 0   Grooming Score 0   Dressing Score 5   Bladder Score 10   Bowels Score 10   Toilet Use Score 5   Transfers (Bed/Chair) Score 10   Mobility (Level Surface) Score 0   Stairs Score 5   Barthel Index Score 55   Modified Hopewell Scale   Modified Hopewell Scale 4         GOALS     Pt will improve activity tolerance to G for min 30 min txment sessions     Pt will complete UB ADLs with MOD IND and use of adaptive device and DME as needed     Pt will complete LB ADLs with MOD IND w/ use of AE and DME as needed     Pt will complete toileting w/ MOD IND w/ G hygiene/thoroughness using DME as needed     Pt will improve functional transfers to Mod I on/off all surfaces using DME as needed w/ G balance/safety      Pt will improve functional mobility during ADL/IADL/leisure tasks to Mod I using DME as needed w/ G balance/safety      Pt will demonstrate G carryover of pt/caregiver education and training as appropriate w/ mod I w/o cues w/ good tolerance    Demo Good carryover with safe use of RW during functional tasks  Pt will complete grooming with MOD IND w/ use of AE as needed  Improve standing balance to Good for 8-10 minutes of participation in functional tasks  Pt will improve bed mobility to MOD IND with Good sitting balance EOB to engage in seated ADL tasks    Pt to demo % carryover of energy conservation strategies during functional tasks          Franchesca Hernandez, OT

## 2019-09-30 NOTE — UTILIZATION REVIEW
Initial Clinical Review    Admission: Date/Time/Statement: Observation 09/29/2019 @ 2301  Orders Placed This Encounter   Procedures    Place in Observation     Standing Status:   Standing     Number of Occurrences:   1     Order Specific Question:   Admitting Physician     Answer:   Shania Morales [[id number]]     Order Specific Question:   Level of Care     Answer:   Med Surg [[id number]]     Assessment/Plan: 60year old male, presented to the ED @ [address], Transferred to [facility] via EMS, higher level of care  Admitted as Observation due to weakness   Admits to refusing rehab placement  When she got home from the hospital this afternoon patient states she was unable to lift her right leg in order to go up 2 steps into her house  She presented to [facility] ED with transfer to [facility] for continuity of care  Generalized weakness:  Discharged from [facility] 9/29 with recommended home PT 3-5x/week however represented to [facility] ED after being unable to lift her right leg to go up 2 stairs at home  Transferred to [facility] for continuity of care  Likely 2/2 physical decondition in the setting of recurrent endometrial cancer  Monitor hemoglobin and transfuse prn  Fall precautions  PT/OT consult  Patient had refused rehab on prior admission but is now agreeable, case management consult  09/30/2019  Monitor hemoglobin and transfuse prn  Swelling of left upper extremity:  Pt has noted LUE pain and swelling since unsuccessful attempt at PICC placement in that arm  Check LUE duplex  PRN pain control  MSSA bacteremia: In the setting of port a cath and was admitted 9/19-9/29 with sepsis  Catheter removed on 9/20 now has R PICC placed 9/27  Repeat cultures have been negative for > 5 days  TRINH without vegetation  ID recommended 6 weeks of abx - on day #11 of abx, Cefazolin day #9        Triage Vitals [09/29/19 2216]   Temperature Pulse Respirations Blood Pressure SpO2   98 2 °F (36 8 °C) 88 16 113/53 99 %      Temp Source Heart Rate Source Patient Position - Orthostatic VS BP Location FiO2 (%)   Oral -- -- Right arm --      Pain Score       8        Wt Readings from Last 1 Encounters:   09/29/19 89 kg (196 lb 3 4 oz)     Additional Vital Signs:   Date/Time  Temp  Pulse  Resp  BP  MAP (mmHg)  SpO2  O2 Device   09/30/19 0754  97 4 °F (36 3 °C)Abnormal   81  18  138/79  99  100 %  None (Room air)   09/29/19 2216  98 2 °F (36 8 °C)  88  16  113/53    99 %       Date and Time Eye Opening Best Verbal Response Best Motor Response Jonny Coma Scale Score   09/28/19 1915 4 5 6 15   09/28/19 1133 4 5 6 15   09/27/19 2000 4 5 6 15   09/27/19 1032 4 5 6 15   09/26/19 2000 4 5 6 15   09/24/19 2251 4 5 6 15   09/23/19 0930 4 5 6 15   09/22/19 0800 4 5 6 15   09/21/19 0745 4 5 6 15   09/19/19 2200 4 5 6 15   09/19/19 1633 4 5 6 15     Pertinent Labs/Diagnostic Test Results:   09/30/2019  Chest X:  Pending  Results from last 7 days   Lab Units 09/30/19  0537 09/29/19  1252 09/29/19  0619 09/28/19  0930 09/28/19  0615 09/26/19  1645  09/26/19  0508   WBC Thousand/uL 11 16* 15 72* 14 56* 13 77* 12 20* 17 24*  --  16 22*   HEMOGLOBIN g/dL 7 4* 8 0* 9 6* 6 2* 6 2* 7 3*   < > 6 8*   HEMATOCRIT % 22 9* 24 5* 29 1* 19 9* 19 5* 22 5*   < > 20 8*   PLATELETS Thousands/uL 151 151 155 157 162 203  --  205   NEUTROS ABS Thousands/µL  --   --   --  10 38* 9 04* 12 36*  --   --    BANDS PCT %  --   --   --   --   --   --   --  3    < > = values in this interval not displayed       Results from last 7 days   Lab Units 09/30/19  0537 09/29/19  0619 09/28/19  0615 09/26/19  1325 09/26/19  0508   SODIUM mmol/L 134* 132* 133* 131* 130*   POTASSIUM mmol/L 5 5* 5 5* 5 8* 4 4 4 7   CHLORIDE mmol/L 107 106 107 101 101   CO2 mmol/L 19* 18* 19* 23 21   ANION GAP mmol/L 8 8 7 7 8   BUN mg/dL 24 24 25 21 21   CREATININE mg/dL 0 98 0 89 0 82 0 90 0 85   EGFR ml/min/1 73sq m 62 70 77 69 74   CALCIUM mg/dL 8 5 8 7 8 9 8 6 8 2*     Results from last 7 days   Lab Units 09/26/19  0508   AST U/L 36   ALT U/L 25   ALK PHOS U/L 95   TOTAL PROTEIN g/dL 6 3*   ALBUMIN g/dL 1 9*   TOTAL BILIRUBIN mg/dL 0 32     Results from last 7 days   Lab Units 09/30/19  0537 09/29/19  0619 09/28/19  0615 09/26/19  1325 09/26/19  0508 09/25/19  0514 09/24/19  0442   GLUCOSE RANDOM mg/dL 58* 74 71 80 88 89 94     Results from last 7 days   Lab Units 09/24/19  1447 09/24/19  1433 09/23/19  1353 09/23/19  1338   BLOOD CULTURE  No Growth After 5 Days  No Growth After 5 Days  No Growth After 5 Days  No Growth After 5 Days  Results from last 7 days   Lab Units 09/26/19  0508   TOTAL COUNTED  100     Past Medical History:   Diagnosis Date    Anemia     Chemotherapy follow-up examination     Depression     [facility] mellitus ([facility] )     Endometrial cancer ([facility] ) 12/2017    Hyperglycemia     vx type 2 dm -- last assessed 4/1/14; resolved 11/7/17    Hyperlipidemia     Hypertension     Obesity     last assessed 10/14/17; resolved 11/7/17     Present on Admission:   Benign essential hypertension   MSSA bacteremia   Anemia due to chemotherapy   Acute deep vein thrombosis (DVT) of proximal vein of lower extremity (HCC)   Endometrial cancer (HCC)      Admitting Diagnosis: Weakness [R53 1]  Age/Sex: 60 y o  female  Admission Orders:  Current Facility-Administered Medications:  acetaminophen 650 mg Oral Q6H PRN   ARIPiprazole 10 mg Oral Daily   aspirin 81 mg Oral Daily   benzonatate 200 mg Oral TID PRN   ceFAZolin 2,000 mg Intravenous Q8H   cholecalciferol 1,000 Units Oral Daily   enoxaparin 90 mg Subcutaneous Q12H CHRISTIANO   LORazepam 1 mg Oral Q6H PRN   pantoprazole 40 mg Oral Early Morning   venlafaxine 75 mg Oral TID   Dysphagia 3 dental soft  Cain SCDs  IP CONSULT TO CASE MANAGEMENT    Network Utilization Review Department  Phone: [phone]; Fax [phone]  [email or web address]  ATTENTION: Please call with any questions or concerns to [phone]  and carefully listen to the prompts so that you are directed to the right person  Send all requests for admission clinical reviews, approved or denied determinations and any other requests to fax [phone]   All voicemails are confidential

## 2019-09-30 NOTE — PLAN OF CARE
Problem: Prexisting or High Potential for Compromised Skin Integrity  Goal: Skin integrity is maintained or improved  Description  INTERVENTIONS:  - Identify patients at risk for skin breakdown  - Assess and monitor skin integrity  - Assess and monitor nutrition and hydration status  - Monitor labs   - Assess for incontinence   - Turn and reposition patient  - Assist with mobility/ambulation  - Relieve pressure over bony prominences  - Avoid friction and shearing  - Provide appropriate hygiene as needed including keeping skin clean and dry  - Evaluate need for skin moisturizer/barrier cream  - Collaborate with interdisciplinary team   - Patient/family teaching  - Consider wound care consult   Outcome: Progressing     Problem: Potential for Falls  Goal: Patient will remain free of falls  Description  INTERVENTIONS:  - Assess patient frequently for physical needs  -  Identify cognitive and physical deficits and behaviors that affect risk of falls  -  [place] fall precautions as indicated by assessment   - Educate patient/family on patient safety including physical limitations  - Instruct patient to call for assistance with activity based on assessment  - Modify environment to reduce risk of injury  - Consider OT/PT consult to assist with strengthening/mobility  Outcome: Progressing     Problem: Nutrition/Hydration-ADULT  Goal: Nutrient/Hydration intake appropriate for improving, restoring or maintaining nutritional needs  Description  Monitor and assess patient's nutrition/hydration status for malnutrition  Collaborate with interdisciplinary team and initiate plan and interventions as ordered  Monitor patient's weight and dietary intake as ordered or per policy  Utilize nutrition screening tool and intervene as necessary  Determine patient's food preferences and provide high-protein, high-caloric foods as appropriate       INTERVENTIONS:  - Monitor oral intake, urinary output, labs, and treatment plans  - Assess nutrition and hydration status and recommend course of action  - Evaluate amount of meals eaten  - Assist patient with eating if necessary   - Allow adequate time for meals  - Recommend/ encourage appropriate diets, oral nutritional supplements, and vitamin/mineral supplements  - Order, calculate, and assess calorie counts as needed  - Recommend, monitor, and adjust tube feedings and TPN/PPN based on assessed needs  - Assess need for intravenous fluids  - Provide specific nutrition/hydration education as appropriate  - Include patient/family/caregiver in decisions related to nutrition  Outcome: Progressing     Problem: PAIN - ADULT  Goal: Verbalizes/displays adequate comfort level or baseline comfort level  Description  Interventions:  - Encourage patient to monitor pain and request assistance  - Assess pain using appropriate pain scale  - Administer analgesics based on type and severity of pain and evaluate response  - Implement non-pharmacological measures as appropriate and evaluate response  - Consider cultural and social influences on pain and pain management  - Notify physician/advanced practitioner if interventions unsuccessful or patient reports new pain  Outcome: Progressing     Problem: INFECTION - ADULT  Goal: Absence or prevention of progression during hospitalization  Description  INTERVENTIONS:  - Assess and monitor for signs and symptoms of infection  - Monitor lab/diagnostic results  - Monitor all insertion sites, i e  indwelling lines, tubes, and drains  - Monitor endotracheal if appropriate and nasal secretions for changes in amount and color  - [place] appropriate cooling/warming therapies per order  - Administer medications as ordered  - Instruct and encourage patient and family to use good hand hygiene technique  - Identify and instruct in appropriate isolation precautions for identified infection/condition  Outcome: Progressing  Goal: Absence of fever/infection during neutropenic period  Description  INTERVENTIONS:  - Monitor WBC    Outcome: Progressing     Problem: SAFETY ADULT  Goal: Maintain or return to baseline ADL function  Description  INTERVENTIONS:  -  Assess patient's ability to carry out ADLs; assess patient's baseline for ADL function and identify physical deficits which impact ability to perform ADLs (bathing, care of mouth/teeth, toileting, grooming, dressing, etc )  - Assess/evaluate cause of self-care deficits   - Assess range of motion  - Assess patient's mobility; develop plan if impaired  - Assess patient's need for assistive devices and provide as appropriate  - Encourage maximum independence but intervene and supervise when necessary  - Involve family in performance of ADLs  - Assess for home care needs following discharge   - Consider OT consult to assist with ADL evaluation and planning for discharge  - Provide patient education as appropriate  Outcome: Progressing  Goal: Maintain or return mobility status to optimal level  Description  INTERVENTIONS:  - Assess patient's baseline mobility status (ambulation, transfers, stairs, etc )    - Identify cognitive and physical deficits and behaviors that affect mobility  - Identify mobility aids required to assist with transfers and/or ambulation (gait belt, sit-to-stand, lift, walker, cane, etc )  - [place] fall precautions as indicated by assessment  - Record patient progress and toleration of activity level on Mobility SBAR; progress patient to next Phase/Stage  - Instruct patient to call for assistance with activity based on assessment  - Consider rehabilitation consult to assist with strengthening/weightbearing, etc   Outcome: Progressing     Problem: DISCHARGE PLANNING  Goal: Discharge to home or other facility with appropriate resources  Description  INTERVENTIONS:  - Identify barriers to discharge w/patient and caregiver  - Arrange for needed discharge resources and transportation as appropriate  - Identify discharge learning needs (meds, wound care, etc )  - Arrange for interpretive services to assist at discharge as needed  - Refer to Case Management Department for coordinating discharge planning if the patient needs post-hospital services based on physician/advanced practitioner order or complex needs related to functional status, cognitive ability, or social support system  Outcome: Progressing

## 2019-09-30 NOTE — ASSESSMENT & PLAN NOTE
· Hemoglobin 8 0-->7 4 this AM   · Required blood transfusions on recent admission, attending obtained conset  · Monitor CBC daily and transfuse prn

## 2019-09-30 NOTE — PHYSICAL THERAPY NOTE
Physical Therapy Evaluation Note    Patient Name: Manuel Harrell    TFZKU'Y Date: 9/30/2019     Problem List  Principal Problem:    Generalized weakness  Active Problems:    Benign essential hypertension    Endometrial cancer ([facility] )    Acute deep vein thrombosis (DVT) of proximal vein of lower extremity (HCC)    Anemia due to chemotherapy    MSSA bacteremia       Past Medical History  Past Medical History:   Diagnosis Date    Anemia     Chemotherapy follow-up examination     Depression     Diabetes mellitus ([facility] )     Endometrial cancer ([facility] ) 12/2017    Hyperglycemia     vx type 2 dm -- last assessed 4/1/14; resolved 11/7/17    Hyperlipidemia     Hypertension     Obesity     last assessed 10/14/17; resolved 11/7/17        Past Surgical History  Past Surgical History:   Procedure Laterality Date    ABDOMINAL SURGERY      GASTRIC BYPASS    CHOLECYSTECTOMY      at the time of gastric bypass    COLONOSCOPY      CT NEEDLE BIOPSY LYMPH NODE  7/8/2019    GASTRIC BYPASS      HYSTERECTOMY Bilateral     total abdominal with salpingo-oophorectomy    IR PICC LINE  9/27/2019    IR PORT PLACEMENT  7/26/2019    IR PORT REMOVAL  9/20/2019    IR TUBE PLACEMENT NEPHROSTOMY  7/26/2019    OOPHORECTOMY Bilateral     CO LAP, RADICAL HYST W/ TUBE&OV, NODE BX N/A 12/19/2017    Procedure: HYSTERECTOMY LAPAROSCOPIC TOTAL ([address]) W/ ROBOTICS; BILATERAL SALPINGOOPHERECTOMY; LYMPH NODE DISSECTION; lysis of adhesions;  Surgeon: Vandana Joaquin MD;  [facility]: [facility];  Service: Gynecology Oncology    CO LAP,DIAGNOSTIC ABDOMEN N/A 12/19/2017    Procedure: LAPAROSCOPY DIAGNOSTIC;  Surgeon: Vandana Joaquin MD;  [facility];  Service: Gynecology Oncology    TONSILLECTOMY      US GUIDED BREAST BIOPSY RIGHT COMPLETE Right 6/28/2019 09/30/19 1139   Note Type   Note type Eval only   Pain Assessment   Pain Assessment Amador-Baker FACES   Amador-Mcqueen FACES Pain Rating 2   Pain Type Acute pain   Pain Location Arm   Pain Orientation Left   Home Living   Type of Home House   Home Layout Two level   Additional Comments Pt lives in a multi story home with 3 RAJANI  Pt has the option of a FFSU  Pt owns a rollator and RW and quad cane  Pt uses an rolato and quad cane prior  Pt is the caregiver for her mother who live with her  Pt was I for ADL's and mobility  Prior Function   Level of [place] Independent with ADLs and functional mobility   Lives With [facility] Help From Family   ADL Assistance Independent   IADLs Independent   Falls in the last 6 months 1 to 4   Restrictions/Precautions   Weight Bearing Precautions Per Order No   Other Precautions Bed Alarm; Chair Alarm; Fall Risk;Pain   General   Family/Caregiver Present Yes   Cognition   Overall Cognitive Status WFL   Arousal/Participation Alert   Attention Within functional limits   Orientation Level Oriented X4   Memory Within functional limits   Following Commands Follows all commands and directions without difficulty   RLE Assessment   RLE Assessment X   RLE Overall AROM   R Hip Flexion 3/5   R Knee Extension 3/5   R Ankle Dorsiflexion 3/5   LLE Assessment   LLE Assessment X   LLE Overall AROM   L Hip Flexion 3/5   L Knee Extension 3/5   L Ankle Dorsiflexion 3+/5   Coordination   Movements are Fluid and Coordinated 0   Sensation WFL   Light Touch   RLE Light Touch Grossly intact   LLE Light Touch Grossly intact   Bed Mobility   Supine to Sit 4  Minimal assistance   Additional items Assist x 2   Sit to Supine 3  Moderate assistance   Transfers   Sit to Stand 5  Supervision   Additional items Assist x 1   Stand to Sit 5  Supervision   Additional items Assist x 1   Ambulation/Elevation   Gait pattern Excessively slow; Shuffling;Narrow NELSON  (SOB with imtied mobility )   Gait Assistance 5  Supervision   Additional items Assist x 1   Assistive Device Rolling walker   Distance 20ftx1, 25ftx1   Stair Management Assistance 4  Minimal assist   Additional items Assist x 1   Stair Management Technique One rail L   Number of Stairs 3   Balance   Static Sitting Good   Dynamic Sitting Fair +   Static Standing Fair   Dynamic Standing Fair   Ambulatory Fair   Endurance Deficit   Endurance Deficit Yes   Activity Tolerance   Activity Tolerance Patient limited by fatigue;Patient limited by pain   Medical Staff Made Aware OT   Nurse Made Aware nurse approved therapy session   Assessment   Prognosis Good   Problem List Decreased strength;Decreased endurance; Impaired balance;Decreased mobility   Assessment Pt is a 59 yo female admitted to [place] [address] on 9/29/2019 s/p patient could not get up her steps and had to crawl into her house  Two patient identifiers were used to confirm  DX: generalized weakness, endometrial CA, MSSA bacteremia, acute DVT, anemia, hypertension  Pt lives in a mutli-story home with her mom who she is as caregiver to  Pt has 3STE without HR  Pt has the option of a FFSU  Pt was I for ADL's and mobility prior to admission  Pt uses both a rollator and quad cane for mobility  Pt also owns a RW  Pt has had 1 fall in the past  Pt's impairments include reduced mobility, increased pain, fall risk, limited endurance with minimal activity and reduced B LE strength  These impairments limit the ability of the patient to perform mobility without increased assistance, return to PLOF and participate in everyday life activities  Pt would benefit from continued skilled therapy while in the hospital to improve mobility and work towards PLOF  Recommend discharge to rehab  At the end of the session the patient was left in seated position with call bell and phone within reach  Barriers to Discharge Decreased caregiver support; Inaccessible home environment   Goals   STG Expiration Date 10/09/19   Short Term Goal #1 STG 1: Pt will perform bed mobility at a I level to return to PLOF   STG 2: Pt will perform transfers at MI level to reduce the level of assistance needed upon d c  STG 3: Pt will ambulate 300ft with least restrictive device gurvinder MI level  Pt will negotiate 3 steps with no HR at a MI level  PT Treatment Day 0   Plan   Treatment/Interventions Functional transfer training;LE strengthening/ROM; Elevations; Therapeutic exercise; Endurance training;Bed mobility;Gait training;Equipment eval/education   PT Frequency Other (Comment)  (3-5xwk)   Recommendation   Recommendation Post acute IP rehab   Equipment Recommended Walker   PT - OK to Discharge Yes   Additional Comments when medically cleared to rehab   Modified Robeson Scale   Modified Lucía Scale 4   Barthel Index   Feeding 10   Bathing 0   Grooming Score 0   Dressing Score 5   Bladder Score 10   Bowels Score 10   Toilet Use Score 5   Transfers (Bed/Chair) Score 10   Mobility (Level Surface) Score 0   Stairs Score 5   Barthel Index Score 55   Yvonne Dorantes, Pt, DPT

## 2019-09-30 NOTE — PLAN OF CARE
Problem: PHYSICAL THERAPY ADULT  Goal: Performs mobility at highest level of function for planned discharge setting  See evaluation for individualized goals  Description    Note:   Prognosis: Good  Problem List: Decreased strength, Decreased endurance, Impaired balance, Decreased mobility  Assessment: Pt is a 59 yo female admitted to [facility] on 9/29/2019 s/p patient could not get up her steps and had to crawl into her house  Two patient identifiers were used to confirm  DX: generalized weakness, endometrial CA, MSSA bacteremia, acute DVT, anemia, hypertension  Pt lives in a mutli-story home with her mom who she is as caregiver to  Pt has 3STE without HR  Pt has the option of a FFSU  Pt was I for ADL's and mobility prior to admission  Pt uses both a rollator and quad cane for mobility  Pt also owns a RW  Pt has had 1 fall in the past  Pt's impairments include reduced mobility, increased pain, fall risk, limited endurance with minimal activity and reduced B LE strength  These impairments limit the ability of the patient to perform mobility without increased assistance, return to PLOF and participate in everyday life activities  Pt would benefit from continued skilled therapy while in the hospital to improve mobility and work towards PLOF  Recommend discharge to rehab  At the end of the session the patient was left in seated position with call bell and phone within reach  Barriers to Discharge: Decreased caregiver support, Inaccessible home environment     Recommendation: Post acute IP rehab     PT - OK to Discharge: Yes    See flowsheet documentation for full assessment

## 2019-10-01 ENCOUNTER — APPOINTMENT (OUTPATIENT)
Dept: NON INVASIVE DIAGNOSTICS | Facility: HOSPITAL | Age: 64
DRG: 982 | End: 2019-10-01
Payer: COMMERCIAL

## 2019-10-01 LAB
ANION GAP SERPL CALCULATED.3IONS-SCNC: 10 MMOL/L (ref 4–13)
BASOPHILS # BLD AUTO: 0.09 THOUSANDS/ΜL (ref 0–0.1)
BASOPHILS NFR BLD AUTO: 1 % (ref 0–1)
BUN SERPL-MCNC: 24 MG/DL (ref 5–25)
CALCIUM SERPL-MCNC: 8.7 MG/DL (ref 8.3–10.1)
CHLORIDE SERPL-SCNC: 105 MMOL/L (ref 100–108)
CO2 SERPL-SCNC: 17 MMOL/L (ref 21–32)
CREAT SERPL-MCNC: 0.88 MG/DL (ref 0.6–1.3)
EOSINOPHIL # BLD AUTO: 0 THOUSAND/ΜL (ref 0–0.61)
EOSINOPHIL NFR BLD AUTO: 0 % (ref 0–6)
ERYTHROCYTE [DISTWIDTH] IN BLOOD BY AUTOMATED COUNT: 16.8 % (ref 11.6–15.1)
GFR SERPL CREATININE-BSD FRML MDRD: 71 ML/MIN/1.73SQ M
GLUCOSE SERPL-MCNC: 64 MG/DL (ref 65–140)
HCT VFR BLD AUTO: 24.9 % (ref 34.8–46.1)
HGB BLD-MCNC: 7.9 G/DL (ref 11.5–15.4)
IMM GRANULOCYTES # BLD AUTO: >0.5 THOUSAND/UL (ref 0–0.2)
IMM GRANULOCYTES NFR BLD AUTO: 6 % (ref 0–2)
LYMPHOCYTES # BLD AUTO: 1.45 THOUSANDS/ΜL (ref 0.6–4.47)
LYMPHOCYTES NFR BLD AUTO: 12 % (ref 14–44)
MCH RBC QN AUTO: 28.2 PG (ref 26.8–34.3)
MCHC RBC AUTO-ENTMCNC: 31.7 G/DL (ref 31.4–37.4)
MCV RBC AUTO: 89 FL (ref 82–98)
MONOCYTES # BLD AUTO: 0.99 THOUSAND/ΜL (ref 0.17–1.22)
MONOCYTES NFR BLD AUTO: 8 % (ref 4–12)
NEUTROPHILS # BLD AUTO: 8.63 THOUSANDS/ΜL (ref 1.85–7.62)
NEUTS SEG NFR BLD AUTO: 73 % (ref 43–75)
NRBC BLD AUTO-RTO: 0 /100 WBCS
PLATELET # BLD AUTO: 171 THOUSANDS/UL (ref 149–390)
PMV BLD AUTO: 10.3 FL (ref 8.9–12.7)
POTASSIUM SERPL-SCNC: 5 MMOL/L (ref 3.5–5.3)
RBC # BLD AUTO: 2.8 MILLION/UL (ref 3.81–5.12)
SODIUM SERPL-SCNC: 132 MMOL/L (ref 136–145)
WBC # BLD AUTO: 11.81 THOUSAND/UL (ref 4.31–10.16)

## 2019-10-01 PROCEDURE — 99225 PR SBSQ OBSERVATION CARE/DAY 25 MINUTES: CPT | Performed by: PHYSICIAN ASSISTANT

## 2019-10-01 PROCEDURE — 93971 EXTREMITY STUDY: CPT | Performed by: SURGERY

## 2019-10-01 PROCEDURE — 85025 COMPLETE CBC W/AUTO DIFF WBC: CPT | Performed by: PHYSICIAN ASSISTANT

## 2019-10-01 PROCEDURE — 93971 EXTREMITY STUDY: CPT

## 2019-10-01 PROCEDURE — 80048 BASIC METABOLIC PNL TOTAL CA: CPT | Performed by: PHYSICIAN ASSISTANT

## 2019-10-01 RX ADMIN — VENLAFAXINE 75 MG: 37.5 TABLET ORAL at 09:30

## 2019-10-01 RX ADMIN — Medication 250 MG: at 09:30

## 2019-10-01 RX ADMIN — PANTOPRAZOLE SODIUM 40 MG: 40 TABLET, DELAYED RELEASE ORAL at 05:02

## 2019-10-01 RX ADMIN — CEFAZOLIN SODIUM 2000 MG: 2 SOLUTION INTRAVENOUS at 22:52

## 2019-10-01 RX ADMIN — ENOXAPARIN SODIUM 90 MG: 100 INJECTION SUBCUTANEOUS at 20:56

## 2019-10-01 RX ADMIN — CEFAZOLIN SODIUM 2000 MG: 2 SOLUTION INTRAVENOUS at 15:02

## 2019-10-01 RX ADMIN — CEFAZOLIN SODIUM 2000 MG: 2 SOLUTION INTRAVENOUS at 07:17

## 2019-10-01 RX ADMIN — MELATONIN 1000 UNITS: at 09:30

## 2019-10-01 RX ADMIN — ENOXAPARIN SODIUM 90 MG: 100 INJECTION SUBCUTANEOUS at 09:31

## 2019-10-01 RX ADMIN — VENLAFAXINE 75 MG: 37.5 TABLET ORAL at 20:55

## 2019-10-01 RX ADMIN — ASPIRIN 81 MG: 81 TABLET, COATED ORAL at 09:30

## 2019-10-01 RX ADMIN — ARIPIPRAZOLE 10 MG: 10 TABLET ORAL at 09:30

## 2019-10-01 RX ADMIN — VENLAFAXINE 75 MG: 37.5 TABLET ORAL at 17:14

## 2019-10-01 RX ADMIN — Medication 250 MG: at 17:14

## 2019-10-01 NOTE — PROGRESS NOTES
[facility]  Progress Note - [place] 1955, 60 y o  female MRN: [id number]    Unit/Bed#: [facility] 910-01 Encounter: [id number]    Primary Care Provider: Jaylon Dubois DO   Date and time admitted to hospital: 9/29/2019 10:04 PM      * Generalized weakness  Assessment & Plan  · Discharged from [facility] 9/29 with recommended home PT 3-5x/week however represented to [facility] ED after being unable to lift her right leg to go up 2 stairs at home   · Transferred to \[facility]\"" for continuity of care   · Likely 2/2 physical decondition in the setting of recurrent endometrial cancer   · Monitor hemoglobin and transfuse prn   · Fall precautions  · PT/OT consults placed - recommending rehab   · Patient had refused rehab on prior admission but is now agreeable, case management following    Bilateral lower extremity edema  Assessment & Plan  · Pt reports to chronic b/l LE edema  Previously was on lasix but this was held by PCP given GI losses (was having diarrhea associated with chemo)  · Would recommend to elevate extremities, compression stockings     Swelling of left upper extremity  Assessment & Plan  · Pt has noted LUE pain and swelling since unsuccessful attempt at PICC placement in that arm  · Check LUE duplex - no evidence of acute or chronic DVT of left upper extremity  No evidence of superficial thrombophlebitis  There is evidence of a non-vascularized structure in the area of the bicep muscle  It did note acute thrombus in IJ vein on the right  · D/w Dr Dulce Diallo who is familiar with the patient from prior hospitalization  These results have been discussed with her previously and felt to be related to port infection  No further changes to the plan at this time    · PRN pain control     Hyperkalemia  Assessment & Plan  · Improved with Kayexalate  · BMP in AM    MSSA bacteremia  Assessment & Plan  · In the setting of port a cath and was admitted 9/19-9/29 with sepsis   · Catheter removed on 9/20 now has R PICC placed 9/27  · Repeat cultures have been negative for > 5 days   · TRINH without vegetation  · ID recommended 6 weeks of abx - on day #12 of abx, Cefazolin day #10    Anemia due to chemotherapy  Assessment & Plan  · Hemoglobin 8 0-->7 4 -->7 9 this AM  · Required blood transfusions on recent admission, attending obtained conset  · Monitor CBC daily and transfuse prn     Acute deep vein thrombosis (DVT) of proximal vein of lower extremity (HCC)  Assessment & Plan  · GYN Onc team switched from Eliquis to Lovenox BID permanently    Endometrial cancer [facility])  Assessment & Plan  · Appreciate Gynecologic Oncology evaluation on recent admission   · She received Taxol/carboplatin and received last chemotherapy 09/09/2019  · Received granix 08/31/2019, also Neulasta 09/09/2019 and 09/16/2019  · Chemotherapy to be on hold for now given acute infection  · CT CAP - shows some improvement in areas of malignancy, R>L pleural effusion, nodules in lung which can be infectious/inflammatory       Benign essential hypertension  Assessment & Plan  · BP acceptbale, monitor routinely   · Continue home medical management     VTE Pharmacologic Prophylaxis:   Pharmacologic: Enoxaparin (Lovenox)  Mechanical VTE Prophylaxis in Place: No    Patient Centered Rounds: I have performed bedside rounds with nursing staff today  Discussions with Specialists or Other Care Team Provider: RN, attending Dr Milla De Guzman, [address] [place] [address]     Education and Discussions with Family / Patient: patient, family at bedside    Time Spent for Care: 30 minutes  More than 50% of total time spent on counseling and coordination of care as described above  Current Length of Stay: 0 day(s)    Current Patient Status: Observation   Certification Statement: continue obs per UR  Needs placement for safe discharge  Medically clear  Discharge Plan: medically clear, needs placement    Code Status: Level 1 - Full Code      Subjective:   Patient has no acute complaints    She feels about the same as she did yesterday  Her arm is still sore but she is trying different positional changes  Objective:     Vitals:   Temp (24hrs), Av 5 °F (36 9 °C), Min:98 3 °F (36 8 °C), Max:98 7 °F (37 1 °C)    Temp:  [98 3 °F (36 8 °C)-98 7 °F (37 1 °C)] 98 3 °F (36 8 °C)  HR:  [83-91] 83  Resp:  [18-20] 18  BP: (140-143)/(79-80) 142/79  SpO2:  [99 %-100 %] 100 %  There is no height or weight on file to calculate BMI  Input and Output Summary (last 24 hours): Intake/Output Summary (Last 24 hours) at 10/1/2019 1518  Last data filed at 10/1/2019 1435  Gross per 24 hour   Intake 768 33 ml   Output 1375 ml   Net -606 67 ml       Physical Exam:     Physical Exam   Constitutional: She is oriented to person, place, and time  No distress  HENT:   Head: Normocephalic and atraumatic  Eyes: EOM are normal  No scleral icterus  Neck: Normal range of motion  Neck supple  Cardiovascular: Normal rate and regular rhythm  Pulmonary/Chest:   Decreased b/l   Abdominal: Soft  Bowel sounds are normal  She exhibits no distension  There is no tenderness  Musculoskeletal: Normal range of motion  She exhibits edema (RUE, extending from knuckles up through bicep area) and tenderness  Neurological: She is alert and oriented to person, place, and time  No cranial nerve deficit  Skin: Skin is warm and dry  She is not diaphoretic  Psychiatric: She has a normal mood and affect  Her behavior is normal    Vitals reviewed  Additional Data:     Labs:    Results from last 7 days   Lab Units 10/01/19  0800  19  0508   WBC Thousand/uL 11 81*   < > 16 22*   HEMOGLOBIN g/dL 7 9*   < > 6 8*   HEMATOCRIT % 24 9*   < > 20 8*   PLATELETS Thousands/uL 171   < > 205   BANDS PCT %  --   --  3   NEUTROS PCT % 73   < >  --    LYMPHS PCT % 12*   < >  --    LYMPHO PCT   --    < > 5*   MONOS PCT % 8   < >  --    MONO PCT   --    < > 8   EOS PCT % 0   < > 0    < > = values in this interval not displayed  Results from last 7 days   Lab Units 10/01/19  0508  09/26/19  0508   SODIUM mmol/L 132*   < > 130*   POTASSIUM mmol/L 5 0   < > 4 7   CHLORIDE mmol/L 105   < > 101   CO2 mmol/L 17*   < > 21   BUN mg/dL 24   < > 21   CREATININE mg/dL 0 88   < > 0 85   ANION GAP mmol/L 10   < > 8   CALCIUM mg/dL 8 7   < > 8 2*   ALBUMIN g/dL  --   --  1 9*   TOTAL BILIRUBIN mg/dL  --   --  0 32   ALK PHOS U/L  --   --  95   ALT U/L  --   --  25   AST U/L  --   --  36   GLUCOSE RANDOM mg/dL 64*   < > 88    < > = values in this interval not displayed  * I Have Reviewed All Lab Data Listed Above  * Additional Pertinent Lab Tests Reviewed: All Labs Within Last 24 Hours Reviewed    Imaging:    Imaging Reports Reviewed Today Include: LUE duplex    Recent Cultures (last 7 days):           Last 24 Hours Medication List:     Current Facility-Administered Medications:  acetaminophen 650 mg Oral Q6H PRN Emperatriz E Held, PA-C    ARIPiprazole 10 mg Oral Daily Emperatriz E Held, PA-C    aspirin 81 mg Oral Daily Emperatriz E Held, PA-C    benzonatate 200 mg Oral TID PRN Emperatriz E Held, PA-C    ceFAZolin 2,000 mg Intravenous Q8H Emperatriz E Held, PA-C Last Rate: 2,000 mg (10/01/19 1502)   cholecalciferol 1,000 Units Oral Daily Emperatriz E Held, PA-C    enoxaparin 90 mg Subcutaneous Q12H [address] Emperatriz E Held, PA-C    LORazepam 1 mg Oral Q6H PRN Emperatriz E Held, PA-C    pantoprazole 40 mg Oral Early Morning Emperatriz E Held, PA-C    saccharomyces boulardii 250 mg Oral BID Sylwia Cobb, PA-WALDEMAR    venlafaxine 75 mg Oral TID Debbie Hwang Held, BRITANY         Today, Patient Was Seen By: Scooter Marques PA-C    ** Please Note: Dictation voice to text software may have been used in the creation of this document   **

## 2019-10-01 NOTE — ASSESSMENT & PLAN NOTE
· Hemoglobin 8 0-->7 4 -->7 9 this AM  · Required blood transfusions on recent admission, attending obtained conset  · Monitor CBC daily and transfuse prn

## 2019-10-01 NOTE — ASSESSMENT & PLAN NOTE
· Pt has noted LUE pain and swelling since unsuccessful attempt at PICC placement in that arm  · Check LUE duplex - no evidence of acute or chronic DVT of left upper extremity  No evidence of superficial thrombophlebitis  There is evidence of a non-vascularized structure in the area of the bicep muscle  It did note acute thrombus in IJ vein on the right  · D/w Dr Dion Clark who is familiar with the patient from prior hospitalization  These results have been discussed with her previously and felt to be related to port infection  No further changes to the plan at this time    · PRN pain control

## 2019-10-01 NOTE — PLAN OF CARE
Problem: Prexisting or High Potential for Compromised Skin Integrity  Goal: Skin integrity is maintained or improved  Description  INTERVENTIONS:  - Identify patients at risk for skin breakdown  - Assess and monitor skin integrity  - Assess and monitor nutrition and hydration status  - Monitor labs   - Assess for incontinence   - Turn and reposition patient  - Assist with mobility/ambulation  - Relieve pressure over bony prominences  - Avoid friction and shearing  - Provide appropriate hygiene as needed including keeping skin clean and dry  - Evaluate need for skin moisturizer/barrier cream  - Collaborate with interdisciplinary team   - Patient/family teaching  - Consider wound care consult   Outcome: Progressing     Problem: Potential for Falls  Goal: Patient will remain free of falls  Description  INTERVENTIONS:  - Assess patient frequently for physical needs  -  Identify cognitive and physical deficits and behaviors that affect risk of falls  -  [place] fall precautions as indicated by assessment   - Educate patient/family on patient safety including physical limitations  - Instruct patient to call for assistance with activity based on assessment  - Modify environment to reduce risk of injury  - Consider OT/PT consult to assist with strengthening/mobility  Outcome: Progressing     Problem: Nutrition/Hydration-ADULT  Goal: Nutrient/Hydration intake appropriate for improving, restoring or maintaining nutritional needs  Description  Monitor and assess patient's nutrition/hydration status for malnutrition  Collaborate with interdisciplinary team and initiate plan and interventions as ordered  Monitor patient's weight and dietary intake as ordered or per policy  Utilize nutrition screening tool and intervene as necessary  Determine patient's food preferences and provide high-protein, high-caloric foods as appropriate       INTERVENTIONS:  - Monitor oral intake, urinary output, labs, and treatment plans  - Assess nutrition and hydration status and recommend course of action  - Evaluate amount of meals eaten  - Assist patient with eating if necessary   - Allow adequate time for meals  - Recommend/ encourage appropriate diets, oral nutritional supplements, and vitamin/mineral supplements  - Order, calculate, and assess calorie counts as needed  - Recommend, monitor, and adjust tube feedings and TPN/PPN based on assessed needs  - Assess need for intravenous fluids  - Provide specific nutrition/hydration education as appropriate  - Include patient/family/caregiver in decisions related to nutrition  Outcome: Progressing     Problem: PAIN - ADULT  Goal: Verbalizes/displays adequate comfort level or baseline comfort level  Description  Interventions:  - Encourage patient to monitor pain and request assistance  - Assess pain using appropriate pain scale  - Administer analgesics based on type and severity of pain and evaluate response  - Implement non-pharmacological measures as appropriate and evaluate response  - Consider cultural and social influences on pain and pain management  - Notify physician/advanced practitioner if interventions unsuccessful or patient reports new pain  Outcome: Progressing     Problem: INFECTION - ADULT  Goal: Absence or prevention of progression during hospitalization  Description  INTERVENTIONS:  - Assess and monitor for signs and symptoms of infection  - Monitor lab/diagnostic results  - Monitor all insertion sites, i e  indwelling lines, tubes, and drains  - Monitor endotracheal if appropriate and nasal secretions for changes in amount and color  - [place] appropriate cooling/warming therapies per order  - Administer medications as ordered  - Instruct and encourage patient and family to use good hand hygiene technique  - Identify and instruct in appropriate isolation precautions for identified infection/condition  Outcome: Progressing     Problem: SAFETY ADULT  Goal: Maintain or return to baseline ADL function  Description  INTERVENTIONS:  -  Assess patient's ability to carry out ADLs; assess patient's baseline for ADL function and identify physical deficits which impact ability to perform ADLs (bathing, care of mouth/teeth, toileting, grooming, dressing, etc )  - Assess/evaluate cause of self-care deficits   - Assess range of motion  - Assess patient's mobility; develop plan if impaired  - Assess patient's need for assistive devices and provide as appropriate  - Encourage maximum independence but intervene and supervise when necessary  - Involve family in performance of ADLs  - Assess for home care needs following discharge   - Consider OT consult to assist with ADL evaluation and planning for discharge  - Provide patient education as appropriate  Outcome: Progressing  Goal: Maintain or return mobility status to optimal level  Description  INTERVENTIONS:  - Assess patient's baseline mobility status (ambulation, transfers, stairs, etc )    - Identify cognitive and physical deficits and behaviors that affect mobility  - Identify mobility aids required to assist with transfers and/or ambulation (gait belt, sit-to-stand, lift, walker, cane, etc )  - [place] fall precautions as indicated by assessment  - Record patient progress and toleration of activity level on Mobility SBAR; progress patient to next Phase/Stage  - Instruct patient to call for assistance with activity based on assessment  - Consider rehabilitation consult to assist with strengthening/weightbearing, etc   Outcome: Progressing     Problem: DISCHARGE PLANNING  Goal: Discharge to home or other facility with appropriate resources  Description  INTERVENTIONS:  - Identify barriers to discharge w/patient and caregiver  - Arrange for needed discharge resources and transportation as appropriate  - Identify discharge learning needs (meds, wound care, etc )  - Arrange for interpretive services to assist at discharge as needed  - Refer to Case Management Department for coordinating discharge planning if the patient needs post-hospital services based on physician/advanced practitioner order or complex needs related to functional status, cognitive ability, or social support system  Outcome: Progressing

## 2019-10-01 NOTE — ASSESSMENT & PLAN NOTE
· Discharged from [facility] 9/[facility] with recommended home PT 3-5x/week however represented to [facility] ED after being unable to lift her right leg to go up 2 stairs at home   · Transferred to [facility] for continuity of care   · Likely 2/2 physical decondition in the setting of recurrent endometrial cancer   · Monitor hemoglobin and transfuse prn   · Fall precautions  · PT/OT consults placed - recommending rehab   · Patient had refused rehab on prior admission but is now agreeable, case management following

## 2019-10-01 NOTE — UTILIZATION REVIEW
Continued Stay Review    Date: 10/01/2019                         Current Patient Class: Observation  Current Level of Care: Med/Surg    HPI:64 y o  female initially admitted on  09/29/2019    Assessment/Plan: Generalized weakness:  PT/OT Consulted - recommending rehab   Patient had refused rehab on prior admission but is now agreeable, case management following    Pertinent Labs/Diagnostic Results:   Upper limb venous duplex, left:  pending  Results from last 7 days   Lab Units 10/01/19  0800 09/30/19  0537 09/29/19  1252 09/29/19  0619 09/28/19  0930 09/28/19  0615  09/26/19  0508   WBC Thousand/uL 11 81* 11 16* 15 72* 14 56* 13 77* 12 20*   < > 16 22*   HEMOGLOBIN g/dL 7 9* 7 4* 8 0* 9 6* 6 2* 6 2*   < > 6 8*   HEMATOCRIT % 24 9* 22 9* 24 5* 29 1* 19 9* 19 5*   < > 20 8*   PLATELETS Thousands/uL 171 151 151 155 157 162   < > 205   NEUTROS ABS Thousands/µL 8 63*  --   --   --  10 38* 9 04*   < >  --    BANDS PCT %  --   --   --   --   --   --   --  3    < > = values in this interval not displayed  Results from last 7 days   Lab Units 10/01/19  0508 09/30/19  0537 09/29/19  0619 09/28/19  0615 09/26/19  1325   SODIUM mmol/L 132* 134* 132* 133* 131*   POTASSIUM mmol/L 5 0 5 5* 5 5* 5 8* 4 4   CHLORIDE mmol/L 105 107 106 107 101   CO2 mmol/L 17* 19* 18* 19* 23   ANION GAP mmol/L 10 8 8 7 7   BUN mg/dL 24 24 24 25 21   CREATININE mg/dL 0 88 0 98 0 89 0 82 0 90   EGFR ml/min/1 73sq m 71 62 70 77 69   CALCIUM mg/dL 8 7 8 5 8 7 8 9 8 6     Results from last 7 days   Lab Units 09/26/19  0508   AST U/L 36   ALT U/L 25   ALK PHOS U/L 95   TOTAL PROTEIN g/dL 6 3*   ALBUMIN g/dL 1 9*   TOTAL BILIRUBIN mg/dL 0 32     Results from last 7 days   Lab Units 10/01/19  0508 09/30/19  0537 09/29/19  0619 09/28/19  0615 09/26/19  1325 09/26/19  0508 09/25/19  0514   GLUCOSE RANDOM mg/dL 64* 58* 74 71 80 88 89     Results from last 7 days   Lab Units 09/24/19  1447 09/24/19  1433   BLOOD CULTURE  No Growth After 5 Days   No Growth After 5 Days  Results from last 7 days   Lab Units 09/26/19  0508   TOTAL COUNTED  100     Vital Signs: /80   Pulse 91   Temp 98 7 °F (37 1 °C)   Resp 20   LMP  (LMP Unknown)   SpO2 99%     Medications:   Scheduled Meds:   Current Facility-Administered Medications:  acetaminophen 650 mg Oral Q6H PRN   ARIPiprazole 10 mg Oral Daily   aspirin 81 mg Oral Daily   benzonatate 200 mg Oral TID PRN   ceFAZolin 2,000 mg Intravenous Q8H   cholecalciferol 1,000 Units Oral Daily   enoxaparin 90 mg Subcutaneous Q12H CHRISTIANO   LORazepam 1 mg Oral Q6H PRN   pantoprazole 40 mg Oral Early Morning   saccharomyces boulardii 250 mg Oral BID   venlafaxine 75 mg Oral TID       Discharge Plan: TBD    Network Utilization Review Department  Phone: [phone]; Fax [phone]  [email or web address]  ATTENTION: Please call with any questions or concerns to [phone]  and carefully listen to the prompts so that you are directed to the right person  Send all requests for admission clinical reviews, approved or denied determinations and any other requests to fax [phone]   All voicemails are confidential

## 2019-10-01 NOTE — ASSESSMENT & PLAN NOTE
· In the setting of port a cath and was admitted 9/19-9/29 with sepsis   · Catheter removed on 9/20 now has R PICC placed 9/27  · Repeat cultures have been negative for > 5 days   · TRINH without vegetation  · ID recommended 6 weeks of abx - on day #12 of abx, Cefazolin day #10 [facility]

## 2019-10-01 NOTE — SOCIAL WORK
[facility] [address], they will advise in AM if bed available    Spoke with pt for additional choices, she will discuss with sister and advise in AM

## 2019-10-02 PROBLEM — E87.5 HYPERKALEMIA: Status: RESOLVED | Noted: 2019-09-30 | Resolved: 2019-10-02

## 2019-10-02 LAB
ABO GROUP BLD: NORMAL
ANION GAP SERPL CALCULATED.3IONS-SCNC: 11 MMOL/L (ref 4–13)
BASOPHILS # BLD AUTO: 0.06 THOUSANDS/ΜL (ref 0–0.1)
BASOPHILS NFR BLD AUTO: 1 % (ref 0–1)
BLD GP AB SCN SERPL QL: NEGATIVE
BUN SERPL-MCNC: 22 MG/DL (ref 5–25)
CALCIUM SERPL-MCNC: 8.5 MG/DL (ref 8.3–10.1)
CHLORIDE SERPL-SCNC: 105 MMOL/L (ref 100–108)
CO2 SERPL-SCNC: 18 MMOL/L (ref 21–32)
CREAT SERPL-MCNC: 0.96 MG/DL (ref 0.6–1.3)
EOSINOPHIL # BLD AUTO: 0.05 THOUSAND/ΜL (ref 0–0.61)
EOSINOPHIL NFR BLD AUTO: 1 % (ref 0–6)
ERYTHROCYTE [DISTWIDTH] IN BLOOD BY AUTOMATED COUNT: 16.5 % (ref 11.6–15.1)
GFR SERPL CREATININE-BSD FRML MDRD: 64 ML/MIN/1.73SQ M
GLUCOSE P FAST SERPL-MCNC: 73 MG/DL (ref 65–99)
GLUCOSE SERPL-MCNC: 73 MG/DL (ref 65–140)
HCT VFR BLD AUTO: 21.1 % (ref 34.8–46.1)
HCT VFR BLD AUTO: 25 % (ref 34.8–46.1)
HGB BLD-MCNC: 6.7 G/DL (ref 11.5–15.4)
HGB BLD-MCNC: 8.2 G/DL (ref 11.5–15.4)
IMM GRANULOCYTES # BLD AUTO: 0.43 THOUSAND/UL (ref 0–0.2)
IMM GRANULOCYTES NFR BLD AUTO: 6 % (ref 0–2)
LYMPHOCYTES # BLD AUTO: 1.3 THOUSANDS/ΜL (ref 0.6–4.47)
LYMPHOCYTES NFR BLD AUTO: 17 % (ref 14–44)
MCH RBC QN AUTO: 28 PG (ref 26.8–34.3)
MCHC RBC AUTO-ENTMCNC: 31.8 G/DL (ref 31.4–37.4)
MCV RBC AUTO: 88 FL (ref 82–98)
MONOCYTES # BLD AUTO: 0.81 THOUSAND/ΜL (ref 0.17–1.22)
MONOCYTES NFR BLD AUTO: 11 % (ref 4–12)
NEUTROPHILS # BLD AUTO: 5.06 THOUSANDS/ΜL (ref 1.85–7.62)
NEUTS SEG NFR BLD AUTO: 64 % (ref 43–75)
NRBC BLD AUTO-RTO: 0 /100 WBCS
PLATELET # BLD AUTO: 134 THOUSANDS/UL (ref 149–390)
PMV BLD AUTO: 11.8 FL (ref 8.9–12.7)
POTASSIUM SERPL-SCNC: 4.4 MMOL/L (ref 3.5–5.3)
RBC # BLD AUTO: 2.39 MILLION/UL (ref 3.81–5.12)
RH BLD: POSITIVE
SODIUM SERPL-SCNC: 134 MMOL/L (ref 136–145)
SPECIMEN EXPIRATION DATE: NORMAL
WBC # BLD AUTO: 7.71 THOUSAND/UL (ref 4.31–10.16)

## 2019-10-02 PROCEDURE — 97535 SELF CARE MNGMENT TRAINING: CPT

## 2019-10-02 PROCEDURE — 85014 HEMATOCRIT: CPT | Performed by: PHYSICIAN ASSISTANT

## 2019-10-02 PROCEDURE — 80048 BASIC METABOLIC PNL TOTAL CA: CPT | Performed by: PHYSICIAN ASSISTANT

## 2019-10-02 PROCEDURE — 85018 HEMOGLOBIN: CPT | Performed by: PHYSICIAN ASSISTANT

## 2019-10-02 PROCEDURE — 99232 SBSQ HOSP IP/OBS MODERATE 35: CPT | Performed by: PHYSICIAN ASSISTANT

## 2019-10-02 PROCEDURE — 83540 ASSAY OF IRON: CPT | Performed by: HOSPITALIST

## 2019-10-02 PROCEDURE — 86900 BLOOD TYPING SEROLOGIC ABO: CPT | Performed by: PHYSICIAN ASSISTANT

## 2019-10-02 PROCEDURE — 85025 COMPLETE CBC W/AUTO DIFF WBC: CPT | Performed by: PHYSICIAN ASSISTANT

## 2019-10-02 PROCEDURE — 86923 COMPATIBILITY TEST ELECTRIC: CPT

## 2019-10-02 PROCEDURE — 83550 IRON BINDING TEST: CPT | Performed by: HOSPITALIST

## 2019-10-02 PROCEDURE — 86901 BLOOD TYPING SEROLOGIC RH(D): CPT | Performed by: PHYSICIAN ASSISTANT

## 2019-10-02 PROCEDURE — 82728 ASSAY OF FERRITIN: CPT | Performed by: HOSPITALIST

## 2019-10-02 PROCEDURE — 86850 RBC ANTIBODY SCREEN: CPT | Performed by: PHYSICIAN ASSISTANT

## 2019-10-02 PROCEDURE — 30233N1 TRANSFUSION OF NONAUTOLOGOUS RED BLOOD CELLS INTO PERIPHERAL VEIN, PERCUTANEOUS APPROACH: ICD-10-PCS | Performed by: INTERNAL MEDICINE

## 2019-10-02 PROCEDURE — 97530 THERAPEUTIC ACTIVITIES: CPT

## 2019-10-02 RX ORDER — ACETAMINOPHEN 325 MG/1
650 TABLET ORAL EVERY 6 HOURS PRN
Status: DISCONTINUED | OUTPATIENT
Start: 2019-10-02 | End: 2019-10-08 | Stop reason: HOSPADM

## 2019-10-02 RX ADMIN — VENLAFAXINE 75 MG: 37.5 TABLET ORAL at 09:40

## 2019-10-02 RX ADMIN — ASPIRIN 81 MG: 81 TABLET, COATED ORAL at 09:40

## 2019-10-02 RX ADMIN — CEFAZOLIN SODIUM 2000 MG: 2 SOLUTION INTRAVENOUS at 23:02

## 2019-10-02 RX ADMIN — Medication 250 MG: at 09:40

## 2019-10-02 RX ADMIN — Medication 250 MG: at 17:18

## 2019-10-02 RX ADMIN — CEFAZOLIN SODIUM 2000 MG: 2 SOLUTION INTRAVENOUS at 15:04

## 2019-10-02 RX ADMIN — ARIPIPRAZOLE 10 MG: 10 TABLET ORAL at 09:40

## 2019-10-02 RX ADMIN — ENOXAPARIN SODIUM 90 MG: 100 INJECTION SUBCUTANEOUS at 21:16

## 2019-10-02 RX ADMIN — VENLAFAXINE 75 MG: 37.5 TABLET ORAL at 17:18

## 2019-10-02 RX ADMIN — VENLAFAXINE 75 MG: 37.5 TABLET ORAL at 21:16

## 2019-10-02 RX ADMIN — ACETAMINOPHEN 650 MG: 325 TABLET ORAL at 00:10

## 2019-10-02 RX ADMIN — LORAZEPAM 1 MG: 1 TABLET ORAL at 00:45

## 2019-10-02 RX ADMIN — LORAZEPAM 1 MG: 1 TABLET ORAL at 18:31

## 2019-10-02 RX ADMIN — MELATONIN 1000 UNITS: at 09:40

## 2019-10-02 RX ADMIN — PANTOPRAZOLE SODIUM 40 MG: 40 TABLET, DELAYED RELEASE ORAL at 05:36

## 2019-10-02 RX ADMIN — ENOXAPARIN SODIUM 90 MG: 100 INJECTION SUBCUTANEOUS at 09:42

## 2019-10-02 RX ADMIN — CEFAZOLIN SODIUM 2000 MG: 2 SOLUTION INTRAVENOUS at 07:21

## 2019-10-02 NOTE — ASSESSMENT & PLAN NOTE
· Discharged from [facility] 9/29 with recommended home PT 3-5x/week however represented to [facility] ED after being unable to lift her right leg to go up 2 stairs at home   · Transferred to [facility] for continuity of care   · Likely 2/2 physical decondition in the setting of recurrent endometrial cancer   · Monitor hemoglobin and transfuse prn   · Fall precautions  · PT/OT consults placed - recommending rehab   · Patient had refused rehab on prior admission but is now agreeable, case management following

## 2019-10-02 NOTE — ASSESSMENT & PLAN NOTE
· In the setting of port a cath and was admitted 9/19-9/29 with sepsis   · Catheter removed on 9/20 now has R PICC placed 9/27  · Repeat cultures have been negative for > 5 days   · TRINH without vegetation  · ID recommended 6 weeks of abx - on day #13 of abx, Cefazolin day #11

## 2019-10-02 NOTE — RESTORATIVE TECHNICIAN NOTE
Restorative Specialist Mobility Note       Activity: Ambulate in mehta, Ambulate in room, Bathroom privileges, Chair, Dangle, Stand at bedside(Educated/encouraged pt to ambulate with assistance 3-4 x's/day  Chair alarm on   Pt callbell, phone/tray within reach )     Assistive Device: Front wheel walker          Linda SANTILLAN, Restorative Technician, [employer]

## 2019-10-02 NOTE — ASSESSMENT & PLAN NOTE
· Pt has noted LUE pain and swelling since unsuccessful attempt at PICC placement in that arm  · Check LUE duplex - no evidence of acute or chronic DVT of left upper extremity  No evidence of superficial thrombophlebitis  There is evidence of a non-vascularized structure in the area of the bicep muscle  It did note acute thrombus in IJ vein on the right  · D/w Dr Jose Lala who is familiar with the patient from prior hospitalization  These results have been discussed with her previously and felt to be related to port infection  No further changes to the plan at this time    · PRN pain control   · Trial of compression sleeve for edema management

## 2019-10-02 NOTE — MALNUTRITION/BMI
This medical record reflects one or more clinical indicators suggestive of malnutrition  Malnutrition Findings:   Malnutrition type: Chronic illness(in the context of nutrition impact symptoms from cancer and cancer txs and recent extended in pt admit resulting in wt loss and sub-optimal po intake)  Degree of Malnutrition: Malnutrition of moderate degree  Malnutrition Characteristics: Inadequate energy, Weight loss(evidenced by 16 6% wt loss since 6/2019 and energy intake meeting less than 75% estimated needs for greater than 1 month; treated with libreralized diet and oral nutrition supplements)        See Nutrition note dated 10/2/19 for additional details  Completed nutrition assessment is viewable in the nutrition documentation

## 2019-10-02 NOTE — PLAN OF CARE
Problem: PHYSICAL THERAPY ADULT  Goal: Performs mobility at highest level of function for planned discharge setting  See evaluation for individualized goals  Description  Treatment/Interventions: Functional transfer training, LE strengthening/ROM, Therapeutic exercise, Endurance training, Patient/family training, Equipment eval/education, Bed mobility, Gait training, Spoke to nursing, Spoke to case management  Equipment Recommended: Fco Villatoro       See flowsheet documentation for full assessment, interventions and recommendations  Outcome: Progressing  Note:   Prognosis: Good  Problem List: Decreased strength, Decreased range of motion, Impaired balance, Decreased mobility, Decreased coordination  Assessment: Pt presents with decreased mobility, strength, balance, and endurance/activity tolerance  Pt demonstrated ability to perform supine to sit at Mod A X1 for LE management and performed STS at [address]  PT required verbal cues for safe hand placement during STS  Pt ambulated 50 ft with RW at supervision  Pt has limited activity tolerance and feels fatigued after 50ft  Pt continues to benefit from skilled therapy to maximize functional independence  Recommendation at this time is rehab for safe D/C  Pt would benefit from continued ambulation, functional transfers, stairs, and endurance/activity tolerance  Barriers to Discharge: Decreased caregiver support, Inaccessible home environment     Recommendation: Post acute IP rehab     PT - OK to Discharge: (when medicaaly stable to rehab)    See flowsheet documentation for full assessment

## 2019-10-02 NOTE — PROGRESS NOTES
Patient will need a greater than 2 midnight stay for anemia requiring blood transfusion  Discussed with UR    Progress Note - Chandrika Melendez 1955, 60 y o  female MRN: [id number]    Unit/Bed#: [facility] 910-01 Encounter: [id number]    Primary Care Provider: Anna Reddy DO   Date and time admitted to hospital: 9/29/2019 10:04 PM        * Generalized weakness  Assessment & Plan  · Discharged from [facility] 9/29 with recommended home PT 3-5x/week however represented to [facility] ED after being unable to lift her right leg to go up 2 stairs at home   · Transferred to [facility] for continuity of care   · Likely 2/2 physical decondition in the setting of recurrent endometrial cancer   · Monitor hemoglobin and transfuse prn   · Fall precautions  · PT/OT consults placed - recommending rehab   · Patient had refused rehab on prior admission but is now agreeable, case management following    Endometrial cancer [facility])  Assessment & Plan  · Appreciate Gynecologic Oncology evaluation on recent admission   · She received Taxol/carboplatin and received last chemotherapy 09/09/2019  · Received granix 08/31/2019, also Neulasta 09/09/2019 and 09/16/2019  · Chemotherapy to be on hold for now given acute infection  · CT CAP - shows some improvement in areas of malignancy, R>L pleural effusion, nodules in lung which can be infectious/inflammatory       Anemia due to chemotherapy  Assessment & Plan  · Hb 6 7 today  · Transfuse 1 unit of PRBC     Swelling of left upper extremity  Assessment & Plan  · Pt has noted LUE pain and swelling since unsuccessful attempt at PICC placement in that arm  · Check LUE duplex - no evidence of acute or chronic DVT of left upper extremity  No evidence of superficial thrombophlebitis  There is evidence of a non-vascularized structure in the area of the bicep muscle  It did note acute thrombus in IJ vein on the right  · D/w Dr Kyrie Diggs who is familiar with the patient from prior hospitalization    These results have been discussed with her previously and felt to be related to port infection  No further changes to the plan at this time  · PRN pain control   · Trial of compression sleeve for edema management     Acute deep vein thrombosis (DVT) of proximal vein of lower extremity (HCC)  Assessment & Plan  · GYN Onc team switched from Eliquis to Lovenox BID permanently    MSSA bacteremia  Assessment & Plan  · In the setting of port a cath and was admitted - with sepsis   · Catheter removed on  now has R PICC placed   · Repeat cultures have been negative for > 5 days   · TRINH without vegetation  · ID recommended 6 weeks of abx - on day #13 of abx, Cefazolin day #11    Benign essential hypertension  Assessment & Plan  · BP acceptbale, monitor routinely   · Continue home medical management     Bilateral lower extremity edema  Assessment & Plan  · Pt reports to chronic b/l LE edema  Previously was on lasix but this was held by PCP given GI losses (was having diarrhea associated with chemo)  · Would recommend to elevate extremities, compression stockings       VTE Pharmacologic Prophylaxis:   Pharmacologic: Enoxaparin (Lovenox)  Mechanical VTE Prophylaxis in Place: Yes    Patient Centered Rounds: I have performed bedside rounds with nursing staff today  Discussions with Specialists or Other Care Team Provider: case management    Education and Discussions with Family / Patient: patient, sister updated at bedside    Time Spent for Care: 30 minutes  More than 50% of total time spent on counseling and coordination of care as described above      Current Length of Stay: 0 day(s)    Current Patient Status: Observation   Certification Statement: Placement pending    Discharge Plan: Pending placement    Code Status: Level 1 - Full Code      Subjective:   C/O LUE swelling    Objective:     Vitals:   Temp (24hrs), Av °F (36 7 °C), Min:97 5 °F (36 4 °C), Max:98 3 °F (36 8 °C)    Temp:  [97 5 °F (36 4 °C)-98 3 °F (36 8 °C)] 97 7 °F (36 5 °C)  HR:  [79-94] 94  Resp:  [18] 18  BP: (138-143)/(79-83) 143/83  SpO2:  [98 %-100 %] 100 %  There is no height or weight on file to calculate BMI  Input and Output Summary (last 24 hours): Intake/Output Summary (Last 24 hours) at 10/2/2019 1130  Last data filed at 10/2/2019 0700  Gross per 24 hour   Intake 418 33 ml   Output 1300 ml   Net -881 67 ml       Physical Exam:     Physical Exam   Constitutional: She is oriented to person, place, and time  She appears well-developed and well-nourished  No distress  HENT:   Head: Normocephalic and atraumatic  Cardiovascular: Normal rate and regular rhythm  Exam reveals no friction rub  No murmur heard  Pulmonary/Chest: Effort normal and breath sounds normal  No respiratory distress  She has no wheezes  Abdominal: Soft  Bowel sounds are normal  She exhibits no distension  There is no tenderness  There is no rebound and no guarding  Musculoskeletal: She exhibits edema (LUE edema, bilateral lower extremity edema)  Neurological: She is alert and oriented to person, place, and time  No cranial nerve deficit  Skin: Skin is warm and dry  No rash noted  Psychiatric: She has a normal mood and affect  Nursing note and vitals reviewed  Additional Data:     Labs:    Results from last 7 days   Lab Units 10/02/19  0545  09/26/19  0508   WBC Thousand/uL 7 71   < > 16 22*   HEMOGLOBIN g/dL 6 7*   < > 6 8*   HEMATOCRIT % 21 1*   < > 20 8*   PLATELETS Thousands/uL 134*   < > 205   BANDS PCT %  --   --  3   NEUTROS PCT % 64   < >  --    LYMPHS PCT % 17   < >  --    LYMPHO PCT   --    < > 5*   MONOS PCT % 11   < >  --    MONO PCT   --    < > 8   EOS PCT % 1   < > 0    < > = values in this interval not displayed       Results from last 7 days   Lab Units 10/02/19  0545  09/26/19  0508   SODIUM mmol/L 134*   < > 130*   POTASSIUM mmol/L 4 4   < > 4 7   CHLORIDE mmol/L 105   < > 101   CO2 mmol/L 18*   < > 21   BUN mg/dL 22   < > 21   CREATININE mg/dL 0 96   < > 0 85   ANION GAP mmol/L 11   < > 8   CALCIUM mg/dL 8 5   < > 8 2*   ALBUMIN g/dL  --   --  1 9*   TOTAL BILIRUBIN mg/dL  --   --  0 32   ALK PHOS U/L  --   --  95   ALT U/L  --   --  25   AST U/L  --   --  36   GLUCOSE RANDOM mg/dL 73   < > 88    < > = values in this interval not displayed  * I Have Reviewed All Lab Data Listed Above  * Additional Pertinent Lab Tests Reviewed: All Labs Within Last 24 Hours Reviewed    Imaging:    Imaging Reports Reviewed Today Include: none  Imaging Personally Reviewed by Myself Includes:  none    Recent Cultures (last 7 days):           Last 24 Hours Medication List:     Current Facility-Administered Medications:  acetaminophen 650 mg Oral Q6H PRN Samanta Sanders, BRITANY    ARIPiprazole 10 mg Oral Daily Emperatriz E Held, PA-C    aspirin 81 mg Oral Daily Emperatriz E Held, PA-C    benzonatate 200 mg Oral TID PRN Emperatriz E Held, PA-C    ceFAZolin 2,000 mg Intravenous Q8H Emperatriz E Held, PA-C Last Rate: 2,000 mg (10/02/19 4935)   cholecalciferol 1,000 Units Oral Daily Emperatriz E Held, PA-C    enoxaparin 90 mg Subcutaneous Q12H [facility] Emperatriz E Held, PA-C    LORazepam 1 mg Oral Q6H PRN Emperatriz E Held, PA-C    pantoprazole 40 mg Oral Early Morning Emperatriz E Held, PA-WALDEMAR    saccharomyces boulardii 250 mg Oral BID Sylwia Cobb, BRITANY    venlafaxine 75 mg Oral TID Shreya Rosas, BRITANY         Today, Patient Was Seen By: Daisy Casey PA-C    ** Please Note: Dictation voice to text software may have been used in the creation of this document   **

## 2019-10-02 NOTE — OCCUPATIONAL THERAPY NOTE
Occupational Therapy Treatment Note      Minesh Melendez    10/2/2019    Principal Problem:    Generalized weakness  Active Problems:    Benign essential hypertension    Endometrial cancer ([facility] [address] )    Acute deep vein thrombosis (DVT) of proximal vein of lower extremity (HCC)    Anemia due to chemotherapy    MSSA bacteremia    Swelling of left upper extremity    Bilateral lower extremity edema      Past Medical History:   Diagnosis Date    Anemia     Chemotherapy follow-up examination     Depression     Diabetes mellitus ([facility] [address] )     [facility] 12/2017    Hyperglycemia     vx type 2 dm -- last assessed 4/1/14; resolved 11/7/17    Hyperlipidemia     Hypertension     Obesity     last assessed 10/14/17; resolved 11/7/17       Past Surgical History:   Procedure Laterality Date    ABDOMINAL SURGERY      GASTRIC BYPASS    CHOLECYSTECTOMY      at the time of gastric bypass    COLONOSCOPY      CT NEEDLE BIOPSY LYMPH NODE  7/8/2019    GASTRIC BYPASS      HYSTERECTOMY Bilateral     total abdominal with salpingo-oophorectomy    IR PICC LINE  9/27/2019    IR PORT PLACEMENT  7/26/2019    IR PORT REMOVAL  9/20/2019    IR TUBE PLACEMENT NEPHROSTOMY  7/26/2019    OOPHORECTOMY Bilateral     MI LAP, RADICAL HYST W/ TUBE&OV, NODE BX N/A 12/19/2017    Procedure: HYSTERECTOMY LAPAROSCOPIC TOTAL ([address]) W/ ROBOTICS; BILATERAL SALPINGOOPHERECTOMY; LYMPH NODE DISSECTION; lysis of adhesions;  Surgeon: Liana Soulier, MD;  Location: [facility];  Service: Gynecology Oncology    MI LAP,DIAGNOSTIC ABDOMEN N/A 12/19/2017    Procedure: LAPAROSCOPY DIAGNOSTIC;  Surgeon: Liana Soulier, MD;  [facility]: [facility];  Service: Gynecology Oncology    TONSILLECTOMY      US GUIDED BREAST BIOPSY RIGHT COMPLETE Right 6/28/2019        10/02/19 1352   Restrictions/Precautions   Weight Bearing Precautions Per Order No   Other Precautions Fall Risk;Telemetry   General   Response to Previous Treatment Patient with no complaints from previous session   Pain Assessment   Pain Assessment No/denies pain   Pain Score No Pain   ADL   Eating Assistance 7  Independent   Eating Deficit Setup; Increased time to complete   Grooming Assistance 4  Minimal Assistance   Grooming Deficit Setup; Increased time to complete;Wash/dry hands; Teeth care;Wash/dry face   UB Bathing Assistance 4  Minimal Assistance   UB Bathing Deficit Increased time to complete   LB Bathing Assistance 3  Moderate Assistance   LB Bathing Deficit Increased time to complete;Right lower leg including foot; Left lower leg including foot; Buttocks   UB Dressing Assistance 4  Minimal Assistance   LB Dressing Assistance 3  Moderate Assistance   LB Dressing Deficit Increased time to complete; Don/doff R sock; Don/doff L sock   Functional Standing Tolerance   Time 4 min    Activity grooming activities at sinkside   Comments support of RW   Bed Mobility   Supine to Sit 4  Minimal assistance   Additional items Assist x 1; Increased time required;HOB elevated;LE management   Transfers   Sit to Stand 4  Minimal assistance   Additional items Assist x 1   Stand to Sit 4  Minimal assistance   Additional items Assist x 1   Stand pivot 5  Supervision   Functional Mobility   Functional Mobility 5  Supervision   Additional Comments 20-30 feet in room and bathroom   Additional items Rolling walker   Cognition   Overall Cognitive Status WFL   Arousal/Participation Cooperative   Attention Within functional limits   Orientation Level Oriented X4   Memory Within functional limits   Activity Tolerance   Activity Tolerance Patient tolerated treatment well;Patient limited by fatigue   Assessment   Assessment pt seen for skilled OT treatment session w focus on self care and ther exercises  Pt needs min assist w bedmobility, especially w RLE  Pt has good balance sitting at EOB, needs min asssist for sit to stand  difficulty w RLE due to weakness  cues to lift foot as not to stumble when walking   She does need mod assist for LB self care, mod assist + physical steadying in stance due to decreased balance and tolerance in stance  she participated in ther ex for UE exercises  LUE remains swollen  Pt is very cooperative, but weak  short term rehab is indicated post acute care to ensure safe dc to home  OT will continue to follow while in acute care  Plan   Treatment Interventions ADL retraining;Functional transfer training;UE strengthening/ROM; Endurance training;Patient/family training;Energy conservation; Activityengagement   Goal Expiration Date 10/14/19   OT Treatment Day 1   OT Frequency 3-5x/wk   Recommendation   OT Discharge Recommendation Short Term Rehab

## 2019-10-02 NOTE — UTILIZATION REVIEW
Continued Stay Review    Date: 10/02/2019                          OBSERVATION 09/29/2019 @ 2301, CONVERTED TO INPATIENT ADMISSION 10/02/2019 @ 1542, DUE TO FURTHER DIAGNOSTIC WORKUP, REQUIRING AT LEAST 2 MIDNIGHT STAY  10/02/19 1542  Inpatient Admission Once     Transfer Service: Hospitalist    Expected Discharge Date: 10/03/19       Question Answer Comment   Admitting Physician Jabier Christianson    Level of Care Med Surg    Estimated length of stay More than 2 Midnights    Certification I certify that inpatient services are medically necessary for this patient for a duration of greater than two midnights  See H&P and MD Progress Notes for additional information about the patient's course of treatment  Current Patient Class: Inpatient  Current Level of Care: Med/Surg    HPI:64 y o  female initially admitted on 09/29/2019   C/o LUE swelling, weakness  Assessment/Plan: drop in H/H, transfuse 1 unit PRBC    Pertinent Labs/Diagnostic Results:   Results from last 7 days   Lab Units 10/02/19  0545 10/01/19  0800 09/30/19  0537 09/29/19  1252 09/29/19  0619 09/28/19  0930  09/26/19  0508   WBC Thousand/uL 7 71 11 81* 11 16* 15 72* 14 56* 13 77*   < > 16 22*   HEMOGLOBIN g/dL 6 7* 7 9* 7 4* 8 0* 9 6* 6 2*   < > 6 8*   HEMATOCRIT % 21 1* 24 9* 22 9* 24 5* 29 1* 19 9*   < > 20 8*   PLATELETS Thousands/uL 134* 171 151 151 155 157   < > 205   NEUTROS ABS Thousands/µL 5 06 8 63*  --   --   --  10 38*   < >  --    BANDS PCT %  --   --   --   --   --   --   --  3    < > = values in this interval not displayed       Results from last 7 days   Lab Units 10/02/19  0545 10/01/19  0508 09/30/19  0537 09/29/19  0619 09/28/19  0615   SODIUM mmol/L 134* 132* 134* 132* 133*   POTASSIUM mmol/L 4 4 5 0 5 5* 5 5* 5 8*   CHLORIDE mmol/L 105 105 107 106 107   CO2 mmol/L 18* 17* 19* 18* 19*   ANION GAP mmol/L 11 10 8 8 7   BUN mg/dL 22 24 24 24 25   CREATININE mg/dL 0 96 0 88 0 98 0 89 0 82   EGFR ml/min/1 73sq m 64 71 62 70 77 CALCIUM mg/dL 8 5 8 7 8 5 8 7 8 9     Results from last 7 days   Lab Units 09/26/19  0508   AST U/L 36   ALT U/L 25   ALK PHOS U/L 95   TOTAL PROTEIN g/dL 6 3*   ALBUMIN g/dL 1 9*   TOTAL BILIRUBIN mg/dL 0 32     Results from last 7 days   Lab Units 10/02/19  0545 10/01/19  0508 09/30/19  0537 09/29/19  0619 09/28/19  0615 09/26/19  1325 09/26/19  0508   GLUCOSE RANDOM mg/dL 73 64* 58* 74 71 80 88     Results from last 7 days   Lab Units 09/26/19  0508   TOTAL COUNTED  100     Vital Signs: /79   Pulse 79   Temp 98 2 °F (36 8 °C)   Resp 18   LMP  (LMP Unknown)   SpO2 99%     Medications:   Scheduled Meds:   Current Facility-Administered Medications:  acetaminophen 650 mg Oral Q6H PRN   ARIPiprazole 10 mg Oral Daily   aspirin 81 mg Oral Daily   benzonatate 200 mg Oral TID PRN   ceFAZolin 2,000 mg Intravenous Q8H   cholecalciferol 1,000 Units Oral Daily   enoxaparin 90 mg Subcutaneous Q12H CHRISTIANO   LORazepam 1 mg Oral Q6H PRN   pantoprazole 40 mg Oral Early Morning   saccharomyces boulardii 250 mg Oral BID   venlafaxine 75 mg Oral TID       Discharge Plan: TBD    Network Utilization Review Department  Phone: [phone]; Fax [phone]  [email or web address]  ATTENTION: Please call with any questions or concerns to [phone]  and carefully listen to the prompts so that you are directed to the right person  Send all requests for admission clinical reviews, approved or denied determinations and any other requests to fax [phone]   All voicemails are confidential

## 2019-10-02 NOTE — PLAN OF CARE
Problem: Prexisting or High Potential for Compromised Skin Integrity  Goal: Skin integrity is maintained or improved  Description  INTERVENTIONS:  - Identify patients at risk for skin breakdown  - Assess and monitor skin integrity  - Assess and monitor nutrition and hydration status  - Monitor labs   - Assess for incontinence   - Turn and reposition patient  - Assist with mobility/ambulation  - Relieve pressure over bony prominences  - Avoid friction and shearing  - Provide appropriate hygiene as needed including keeping skin clean and dry  - Evaluate need for skin moisturizer/barrier cream  - Collaborate with interdisciplinary team   - Patient/family teaching  - Consider wound care consult   Outcome: Progressing     Problem: Potential for Falls  Goal: Patient will remain free of falls  Description  INTERVENTIONS:  - Assess patient frequently for physical needs  -  Identify cognitive and physical deficits and behaviors that affect risk of falls  -  [place] fall precautions as indicated by assessment   - Educate patient/family on patient safety including physical limitations  - Instruct patient to call for assistance with activity based on assessment  - Modify environment to reduce risk of injury  - Consider OT/PT consult to assist with strengthening/mobility  Outcome: Progressing     Problem: Nutrition/Hydration-ADULT  Goal: Nutrient/Hydration intake appropriate for improving, restoring or maintaining nutritional needs  Description  Monitor and assess patient's nutrition/hydration status for malnutrition  Collaborate with interdisciplinary team and initiate plan and interventions as ordered  Monitor patient's weight and dietary intake as ordered or per policy  Utilize nutrition screening tool and intervene as necessary  Determine patient's food preferences and provide high-protein, high-caloric foods as appropriate       INTERVENTIONS:  - Monitor oral intake, urinary output, labs, and treatment plans  - Assess nutrition and hydration status and recommend course of action  - Evaluate amount of meals eaten  - Assist patient with eating if necessary   - Allow adequate time for meals  - Recommend/ encourage appropriate diets, oral nutritional supplements, and vitamin/mineral supplements  - Order, calculate, and assess calorie counts as needed  - Recommend, monitor, and adjust tube feedings and TPN/PPN based on assessed needs  - Assess need for intravenous fluids  - Provide specific nutrition/hydration education as appropriate  - Include patient/family/caregiver in decisions related to nutrition  Outcome: Progressing     Problem: PAIN - ADULT  Goal: Verbalizes/displays adequate comfort level or baseline comfort level  Description  Interventions:  - Encourage patient to monitor pain and request assistance  - Assess pain using appropriate pain scale  - Administer analgesics based on type and severity of pain and evaluate response  - Implement non-pharmacological measures as appropriate and evaluate response  - Consider cultural and social influences on pain and pain management  - Notify physician/advanced practitioner if interventions unsuccessful or patient reports new pain  Outcome: Progressing     Problem: INFECTION - ADULT  Goal: Absence or prevention of progression during hospitalization  Description  INTERVENTIONS:  - Assess and monitor for signs and symptoms of infection  - Monitor lab/diagnostic results  - Monitor all insertion sites, i e  indwelling lines, tubes, and drains  - Monitor endotracheal if appropriate and nasal secretions for changes in amount and color  - [place] appropriate cooling/warming therapies per order  - Administer medications as ordered  - Instruct and encourage patient and family to use good hand hygiene technique  - Identify and instruct in appropriate isolation precautions for identified infection/condition  Outcome: Progressing     Problem: SAFETY ADULT  Goal: Maintain or return to baseline ADL function  Description  INTERVENTIONS:  -  Assess patient's ability to carry out ADLs; assess patient's baseline for ADL function and identify physical deficits which impact ability to perform ADLs (bathing, care of mouth/teeth, toileting, grooming, dressing, etc )  - Assess/evaluate cause of self-care deficits   - Assess range of motion  - Assess patient's mobility; develop plan if impaired  - Assess patient's need for assistive devices and provide as appropriate  - Encourage maximum independence but intervene and supervise when necessary  - Involve family in performance of ADLs  - Assess for home care needs following discharge   - Consider OT consult to assist with ADL evaluation and planning for discharge  - Provide patient education as appropriate  Outcome: Progressing  Goal: Maintain or return mobility status to optimal level  Description  INTERVENTIONS:  - Assess patient's baseline mobility status (ambulation, transfers, stairs, etc )    - Identify cognitive and physical deficits and behaviors that affect mobility  - Identify mobility aids required to assist with transfers and/or ambulation (gait belt, sit-to-stand, lift, walker, cane, etc )  - [place] fall precautions as indicated by assessment  - Record patient progress and toleration of activity level on Mobility SBAR; progress patient to next Phase/Stage  - Instruct patient to call for assistance with activity based on assessment  - Consider rehabilitation consult to assist with strengthening/weightbearing, etc   Outcome: Progressing     Problem: DISCHARGE PLANNING  Goal: Discharge to home or other facility with appropriate resources  Description  INTERVENTIONS:  - Identify barriers to discharge w/patient and caregiver  - Arrange for needed discharge resources and transportation as appropriate  - Identify discharge learning needs (meds, wound care, etc )  - Arrange for interpretive services to assist at discharge as needed  - Refer to Case Management Department for coordinating discharge planning if the patient needs post-hospital services based on physician/advanced practitioner order or complex needs related to functional status, cognitive ability, or social support system  Outcome: Progressing

## 2019-10-02 NOTE — PHYSICAL THERAPY NOTE
PHYSICAL THERAPY NOTE      Patient Name: Krystle Leon  UPSBQ'O Date: 10/2/2019     10/02/19 1508   Pain Assessment   Pain Assessment No/denies pain   Pain Score No Pain   Restrictions/Precautions   Weight Bearing Precautions Per Order No   Other Precautions Fall Risk;Telemetry   General   Chart Reviewed Yes   Response to Previous Treatment Patient with no complaints from previous session  Family/Caregiver Present No   Cognition   Overall Cognitive Status WFL   Arousal/Participation Cooperative   Attention Within functional limits   Orientation Level Oriented X4   Memory Within functional limits   Following Commands Follows all commands and directions without difficulty   Subjective   Subjective "I'm feeling tired today"   Bed Mobility   Sit to Supine 3  Moderate assistance   Additional items Assist x 1; Increased time required;Verbal cues;LE management   Additional Comments pt sitting EOB upon arrival   Transfers   Sit to Stand 4  Minimal assistance   Additional items Assist x 1; Increased time required;Verbal cues   Stand to Sit 4  Minimal assistance   Additional items Assist x 1; Increased time required;Verbal cues   Ambulation/Elevation   Gait pattern Excessively slow; Short stride;Narrow NELSON   Gait Assistance 5  Supervision   Additional items Assist x 1   Assistive Device Rolling walker   Distance 50ft   Stair Management Assistance Not tested   Balance   Static Sitting Good   Static Standing Fair   Ambulatory Fair -   Endurance Deficit   Endurance Deficit Yes   Endurance Deficit Description fatigue, weakness   Activity Tolerance   Activity Tolerance Patient limited by fatigue   Medical Staff Made Aware CM   Nurse Made Aware RN cleared pt for therapy   Assessment   Prognosis Good   Problem List Decreased strength;Decreased range of motion; Impaired balance;Decreased mobility; Decreased coordination   Assessment Pt presents with decreased mobility, strength, balance, and endurance/activity tolerance  Pt demonstrated ability to perform supine to sit at Mod A X1 for LE management and performed STS at [address]  PT required verbal cues for safe hand placement during STS  Pt ambulated 50 ft with RW at supervision  Pt has limited activity tolerance and feels fatigued after 50ft  Pt deferred attempting stairs this session due to increased fatigue  Pt continues to benefit from skilled therapy to maximize functional independence  Recommendation at this time is rehab for safe D/C  Pt would benefit from continued ambulation, functional transfers, stairs, and endurance/activity tolerance  Barriers to Discharge Decreased caregiver support; Inaccessible home environment   Goals   Patient Goals to be able to do the stairs at home   STG Expiration Date 10/09/19   PT Treatment Day 1   Plan   Treatment/Interventions Functional transfer training;LE strengthening/ROM; Therapeutic exercise; Endurance training;Patient/family training;Equipment eval/education; Bed mobility;Gait training;Spoke to nursing;Spoke to case management   Progress Slow progress, decreased activity tolerance   PT Frequency   (3-5x/week)   Recommendation   Recommendation Post acute IP rehab   Equipment Recommended Kaylin Lemon   PT - OK to Discharge   (when medicaaly stable to rehab)   Additional Comments      Ora Arms, PT, DPT

## 2019-10-02 NOTE — UTILIZATION REVIEW
Notification of Discharge  This is a Notification of Discharge from our facility [address]  Please be advised that this patient has been discharge from our facility  Below you will find the admission and discharge date and time including the patients disposition  PRESENTATION DATE: 9/19/2019  4:09 PM  OBS ADMISSION DATE:   IP ADMISSION DATE: 9/19/19 2141   DISCHARGE DATE: 9/29/2019  3:15 PM  DISPOSITION: [address]   Admission Orders listed below:  Admission Orders (From admission, onward)     Ordered        09/19/19 2141  Inpatient Admission (expected length of stay for this patient Order details is greater than two midnights)  Once                   Please contact the UR Department if additional information is required to close this patient's authorization/case  [address]  Utilization Review Department  Phone: [phone]; Fax [phone]  [email or web address]  ATTENTION: Please call with any questions or concerns to [phone]  and carefully listen to the prompts so that you are directed to the right person  Send all requests for admission clinical reviews, approved or denied determinations and any other requests to fax [phone]   All voicemails are confidential

## 2019-10-02 NOTE — PLAN OF CARE
Problem: OCCUPATIONAL THERAPY ADULT  Goal: Performs self-care activities at highest level of function for planned discharge setting  See evaluation for individualized goals  Description  Treatment Interventions: ADL retraining, Functional transfer training, UE strengthening/ROM, Endurance training, Patient/family training, Equipment evaluation/education, Energy conservation, Activityengagement  Equipment Recommended: Bedside commode       See flowsheet documentation for full assessment, interventions and recommendations  Note:   Limitation: Decreased ADL status, Decreased UE ROM, Decreased UE strength, Decreased endurance, Decreased self-care trans, Decreased high-level ADLs  Prognosis: Good  Assessment: pt seen for skilled OT treatment session w focus on self care and ther exercises  Pt needs min assist w bedmobility, especially w RLE  Pt has good balance sitting at EOB, needs min asssist for sit to stand  difficulty w RLE due to weakness  cues to lift foot as not to stumble when walking  She does need mod assist for LB self care, mod assist + physical steadying in stance due to decreased balance and tolerance in stance  she participated in ther ex for UE exercises  LUE remains swollen  Pt is very cooperative, but weak  short term rehab is indicated post acute care to ensure safe dc to home  OT will continue to follow while in acute care        OT Discharge Recommendation: Short Term Rehab  OT - OK to Discharge: Yes(to STR when medically appropriate)

## 2019-10-02 NOTE — SOCIAL WORK
DCP- met with pt and sister to discuss additional SNF choices  They would like [facility] [address], same entered in [facility]    Pt notified no beds available at facilities selected  She would like referral to [facility]    [facility] has accepted, pt agreeable to [facility]    Nayeli, liaison aware and will submit for auth    IMM not given-commercial plan

## 2019-10-03 PROBLEM — E44.0 MODERATE PROTEIN-CALORIE MALNUTRITION (HCC): Status: ACTIVE | Noted: 2019-10-03

## 2019-10-03 LAB
ABO GROUP BLD BPU: NORMAL
BPU ID: NORMAL
CROSSMATCH: NORMAL
ERYTHROCYTE [DISTWIDTH] IN BLOOD BY AUTOMATED COUNT: 16.3 % (ref 11.6–15.1)
FERRITIN SERPL-MCNC: 1042 NG/ML (ref 8–388)
HCT VFR BLD AUTO: 22.9 % (ref 34.8–46.1)
HCT VFR BLD AUTO: 23.7 % (ref 34.8–46.1)
HGB BLD-MCNC: 7.5 G/DL (ref 11.5–15.4)
HGB BLD-MCNC: 7.6 G/DL (ref 11.5–15.4)
IRON SATN MFR SERPL: 17 %
IRON SERPL-MCNC: 24 UG/DL (ref 50–170)
MCH RBC QN AUTO: 28.8 PG (ref 26.8–34.3)
MCHC RBC AUTO-ENTMCNC: 32.8 G/DL (ref 31.4–37.4)
MCV RBC AUTO: 88 FL (ref 82–98)
PLATELET # BLD AUTO: 133 THOUSANDS/UL (ref 149–390)
PMV BLD AUTO: 10.9 FL (ref 8.9–12.7)
RBC # BLD AUTO: 2.6 MILLION/UL (ref 3.81–5.12)
TIBC SERPL-MCNC: 138 UG/DL (ref 250–450)
UNIT DISPENSE STATUS: NORMAL
UNIT PRODUCT CODE: NORMAL
UNIT RH: NORMAL
WBC # BLD AUTO: 7.86 THOUSAND/UL (ref 4.31–10.16)

## 2019-10-03 PROCEDURE — P9040 RBC LEUKOREDUCED IRRADIATED: HCPCS

## 2019-10-03 PROCEDURE — 85027 COMPLETE CBC AUTOMATED: CPT | Performed by: PHYSICIAN ASSISTANT

## 2019-10-03 PROCEDURE — 85018 HEMOGLOBIN: CPT | Performed by: HOSPITALIST

## 2019-10-03 PROCEDURE — 99232 SBSQ HOSP IP/OBS MODERATE 35: CPT | Performed by: HOSPITALIST

## 2019-10-03 PROCEDURE — 85014 HEMATOCRIT: CPT | Performed by: HOSPITALIST

## 2019-10-03 RX ADMIN — ARIPIPRAZOLE 10 MG: 10 TABLET ORAL at 08:36

## 2019-10-03 RX ADMIN — LORAZEPAM 1 MG: 1 TABLET ORAL at 23:28

## 2019-10-03 RX ADMIN — ENOXAPARIN SODIUM 90 MG: 100 INJECTION SUBCUTANEOUS at 20:33

## 2019-10-03 RX ADMIN — CEFAZOLIN SODIUM 2000 MG: 2 SOLUTION INTRAVENOUS at 15:46

## 2019-10-03 RX ADMIN — CEFAZOLIN SODIUM 2000 MG: 2 SOLUTION INTRAVENOUS at 23:02

## 2019-10-03 RX ADMIN — VENLAFAXINE 75 MG: 37.5 TABLET ORAL at 18:18

## 2019-10-03 RX ADMIN — VENLAFAXINE 75 MG: 37.5 TABLET ORAL at 08:36

## 2019-10-03 RX ADMIN — ENOXAPARIN SODIUM 90 MG: 100 INJECTION SUBCUTANEOUS at 08:36

## 2019-10-03 RX ADMIN — CEFAZOLIN SODIUM 2000 MG: 2 SOLUTION INTRAVENOUS at 08:36

## 2019-10-03 RX ADMIN — ASPIRIN 81 MG: 81 TABLET, COATED ORAL at 08:36

## 2019-10-03 RX ADMIN — Medication 250 MG: at 18:18

## 2019-10-03 RX ADMIN — PANTOPRAZOLE SODIUM 40 MG: 40 TABLET, DELAYED RELEASE ORAL at 05:26

## 2019-10-03 RX ADMIN — MELATONIN 1000 UNITS: at 08:36

## 2019-10-03 RX ADMIN — Medication 250 MG: at 08:36

## 2019-10-03 RX ADMIN — VENLAFAXINE 75 MG: 37.5 TABLET ORAL at 20:33

## 2019-10-03 RX ADMIN — ACETAMINOPHEN 650 MG: 325 TABLET ORAL at 00:57

## 2019-10-03 RX ADMIN — LORAZEPAM 1 MG: 1 TABLET ORAL at 09:18

## 2019-10-03 NOTE — ASSESSMENT & PLAN NOTE
· In the setting of port a cath and was admitted 9/19-9/29 with sepsis   · Catheter removed on 9/20 now has R PICC placed 9/27  · Repeat cultures have been negative for > 5 days   · TRINH without vegetation  · ID recommended 6 weeks of abx - on day #14 of abx, Cefazolin day #12

## 2019-10-03 NOTE — ASSESSMENT & PLAN NOTE
· GYN Onc team switched from Eliquis to Lovenox BID permanently  · Consider repeat US as outpatient in 2 weeks or so

## 2019-10-03 NOTE — ASSESSMENT & PLAN NOTE
· Discharged from [facility] 9/29 with recommended home PT 3-5x/week however represented to [facility] ED after being unable to lift her right leg to go up 2 stairs at home   · Transferred to [facility] for continuity of care   · Likely 2/2 physical decondition in the setting of recurrent endometrial cancer   · Monitor hemoglobin and transfuse prn   · Fall precautions  · PT/OT consults placed - recommending rehab   · Patient had refused rehab on prior admission but is now agreeable, case management following

## 2019-10-03 NOTE — ASSESSMENT & PLAN NOTE
Malnutrition Findings:   Malnutrition type: Chronic illness(in the context of nutrition impact symptoms from cancer and cancer txs and recent extended in pt admit resulting in wt loss and sub-optimal po intake)  Degree of Malnutrition: Malnutrition of moderate degree    BMI Findings: Body mass index is 39 63 kg/m²

## 2019-10-03 NOTE — ASSESSMENT & PLAN NOTE
· Hb 6 7 s/p 1 unit of PRBC - 8 2 - 7 5  · Received transfusion last week as well  · May need to look for other causes  · Give 1 U PRBC, check stool occult  · Had EGD on 9/24 without evidence of source of blood loss

## 2019-10-03 NOTE — PLAN OF CARE
Problem: Prexisting or High Potential for Compromised Skin Integrity  Goal: Skin integrity is maintained or improved  Description  INTERVENTIONS:  - Identify patients at risk for skin breakdown  - Assess and monitor skin integrity  - Assess and monitor nutrition and hydration status  - Monitor labs   - Assess for incontinence   - Turn and reposition patient  - Assist with mobility/ambulation  - Relieve pressure over bony prominences  - Avoid friction and shearing  - Provide appropriate hygiene as needed including keeping skin clean and dry  - Evaluate need for skin moisturizer/barrier cream  - Collaborate with interdisciplinary team   - Patient/family teaching  - Consider wound care consult   Outcome: Progressing     Problem: Potential for Falls  Goal: Patient will remain free of falls  Description  INTERVENTIONS:  - Assess patient frequently for physical needs  -  Identify cognitive and physical deficits and behaviors that affect risk of falls  -  [place] fall precautions as indicated by assessment   - Educate patient/family on patient safety including physical limitations  - Instruct patient to call for assistance with activity based on assessment  - Modify environment to reduce risk of injury  - Consider OT/PT consult to assist with strengthening/mobility  Outcome: Progressing     Problem: Nutrition/Hydration-ADULT  Goal: Nutrient/Hydration intake appropriate for improving, restoring or maintaining nutritional needs  Description  Monitor and assess patient's nutrition/hydration status for malnutrition  Collaborate with interdisciplinary team and initiate plan and interventions as ordered  Monitor patient's weight and dietary intake as ordered or per policy  Utilize nutrition screening tool and intervene as necessary  Determine patient's food preferences and provide high-protein, high-caloric foods as appropriate       INTERVENTIONS:  - Monitor oral intake, urinary output, labs, and treatment plans  - Assess nutrition and hydration status and recommend course of action  - Evaluate amount of meals eaten  - Assist patient with eating if necessary   - Allow adequate time for meals  - Recommend/ encourage appropriate diets, oral nutritional supplements, and vitamin/mineral supplements  - Order, calculate, and assess calorie counts as needed  - Recommend, monitor, and adjust tube feedings and TPN/PPN based on assessed needs  - Assess need for intravenous fluids  - Provide specific nutrition/hydration education as appropriate  - Include patient/family/caregiver in decisions related to nutrition  Outcome: Progressing     Problem: PAIN - ADULT  Goal: Verbalizes/displays adequate comfort level or baseline comfort level  Description  Interventions:  - Encourage patient to monitor pain and request assistance  - Assess pain using appropriate pain scale  - Administer analgesics based on type and severity of pain and evaluate response  - Implement non-pharmacological measures as appropriate and evaluate response  - Consider cultural and social influences on pain and pain management  - Notify physician/advanced practitioner if interventions unsuccessful or patient reports new pain  Outcome: Progressing     Problem: INFECTION - ADULT  Goal: Absence or prevention of progression during hospitalization  Description  INTERVENTIONS:  - Assess and monitor for signs and symptoms of infection  - Monitor lab/diagnostic results  - Monitor all insertion sites, i e  indwelling lines, tubes, and drains  - Monitor endotracheal if appropriate and nasal secretions for changes in amount and color  - [place] appropriate cooling/warming therapies per order  - Administer medications as ordered  - Instruct and encourage patient and family to use good hand hygiene technique  - Identify and instruct in appropriate isolation precautions for identified infection/condition  Outcome: Progressing     Problem: SAFETY ADULT  Goal: Maintain or return to baseline ADL function  Description  INTERVENTIONS:  -  Assess patient's ability to carry out ADLs; assess patient's baseline for ADL function and identify physical deficits which impact ability to perform ADLs (bathing, care of mouth/teeth, toileting, grooming, dressing, etc )  - Assess/evaluate cause of self-care deficits   - Assess range of motion  - Assess patient's mobility; develop plan if impaired  - Assess patient's need for assistive devices and provide as appropriate  - Encourage maximum independence but intervene and supervise when necessary  - Involve family in performance of ADLs  - Assess for home care needs following discharge   - Consider OT consult to assist with ADL evaluation and planning for discharge  - Provide patient education as appropriate  Outcome: Progressing  Goal: Maintain or return mobility status to optimal level  Description  INTERVENTIONS:  - Assess patient's baseline mobility status (ambulation, transfers, stairs, etc )    - Identify cognitive and physical deficits and behaviors that affect mobility  - Identify mobility aids required to assist with transfers and/or ambulation (gait belt, sit-to-stand, lift, walker, cane, etc )  - [place] fall precautions as indicated by assessment  - Record patient progress and toleration of activity level on Mobility SBAR; progress patient to next Phase/Stage  - Instruct patient to call for assistance with activity based on assessment  - Consider rehabilitation consult to assist with strengthening/weightbearing, etc   Outcome: Progressing     Problem: DISCHARGE PLANNING  Goal: Discharge to home or other facility with appropriate resources  Description  INTERVENTIONS:  - Identify barriers to discharge w/patient and caregiver  - Arrange for needed discharge resources and transportation as appropriate  - Identify discharge learning needs (meds, wound care, etc )  - Arrange for interpretive services to assist at discharge as needed  - Refer to Case Management Department for coordinating discharge planning if the patient needs post-hospital services based on physician/advanced practitioner order or complex needs related to functional status, cognitive ability, or social support system  Outcome: Progressing

## 2019-10-03 NOTE — UTILIZATION REVIEW
Notification of Inpatient Admission/Inpatient Authorization Request  This is a Notification of Inpatient Admission/Request for Inpatient Authorization for our facility [address] [place], [place] [address]  Be advised that this patient was admitted to our facility under Inpatient Status  Please contact the Utilization Review Department where the patient is receiving care services for additional admission information  Place of Service Code: 24   Place of Service Name: Inpatient Hospital  Presentation Date & Time: 9/29/2019 10:04 PM  Inpatient Admission Date & Time: 10/2/19 1542  Discharge Date & Time: No discharge date for patient encounter  Discharge Disposition (if discharged): [facility]  Attending Physician and NPI#: Cipriano Vaughn, [address] [[id number]]  Admission Orders (From admission, onward)     Ordered        10/02/19 1542  Inpatient Admission  Once         09/29/19 2301  Place in Observation  Once                   Facility: [address] [place] [address])  Network Utilization Review Department  Phone: [phone]; Fax [phone]  [email or web address]  ATTENTION: Please call with any questions or concerns to [phone]  and carefully listen to the prompts so that you are directed to the right person  Send all requests for admission clinical reviews, approved or denied determinations and any other requests to fax [phone]   All voicemails are confidential

## 2019-10-03 NOTE — PROGRESS NOTES
Progress Note - Payette Jose Melendez 1955, 60 y o  female MRN: [id number]    Unit/Bed#: [facility] [place] 910-01 Encounter: [id number]    Primary Care Provider: Kadie Aviles DO   Date and time admitted to hospital: 9/29/2019 10:04 PM        Moderate protein-calorie malnutrition ([address] )  Assessment & Plan  Malnutrition Findings:   Malnutrition type: Chronic illness(in the context of nutrition impact symptoms from cancer and cancer txs and recent extended in pt admit resulting in wt loss and sub-optimal po intake)  Degree of Malnutrition: Malnutrition of moderate degree    BMI Findings: Body mass index is 39 63 kg/m²  Bilateral lower extremity edema  Assessment & Plan  · Pt reports to chronic b/l LE edema  Previously was on lasix but this was held by PCP given GI losses (was having diarrhea associated with chemo)  · Pt doesn't think she is ready for it yet  · Would recommend to elevate extremities, compression stockings     Swelling of left upper extremity  Assessment & Plan  · Pt has noted LUE pain and swelling since unsuccessful attempt at PICC placement in that arm  · Check LUE duplex - no evidence of acute or chronic DVT of left upper extremity  No evidence of superficial thrombophlebitis  There is evidence of a non-vascularized structure in the area of the bicep muscle  It did note acute thrombus in IJ vein on the right  ·  These results have been discussed with her previously and felt to be related to port infection  No further changes to the plan at this time    · xarelto was already changed to lovenox last admission  · PRN pain control   · Trial of compression sleeve for edema management     MSSA bacteremia  Assessment & Plan  · In the setting of port a cath and was admitted 9/19-9/29 with sepsis   · Catheter removed on 9/20 now has R PICC placed 9/27  · Repeat cultures have been negative for > 5 days   · TRINH without vegetation  · ID recommended 6 weeks of abx - on day #14 of abx, Cefazolin day #12    Anemia due to chemotherapy  Assessment & Plan  · Hb 6 7 s/p 1 unit of PRBC - 8 2 - 7 5  · Received transfusion last week as well  · May need to look for other causes  · Give 1 U PRBC, check stool occult  · Had EGD on 9/24 without evidence of source of blood loss    Acute deep vein thrombosis (DVT) of proximal vein of lower extremity ([address] )  Assessment & Plan  · GYN Onc team switched from Eliquis to Lovenox BID permanently  · Consider repeat US as outpatient in 2 weeks or so    Endometrial cancer [facility])  Assessment & Plan  · Appreciate Gynecologic Oncology evaluation on recent admission   · She received Taxol/carboplatin and received last chemotherapy 09/09/2019  · Received granix 08/31/2019, also Neulasta 09/09/2019 and 09/16/2019  · Chemotherapy to be on hold for now given acute infection  · CT CAP - shows some improvement in areas of malignancy, R>L pleural effusion, nodules in lung which can be infectious/inflammatory       Benign essential hypertension  Assessment & Plan  · BP acceptbale, monitor routinely   · Continue home medical management     * Generalized weakness  Assessment & Plan  · Discharged from [facility] 9/29 with recommended home PT 3-5x/week however represented to [facility] ED after being unable to lift her right leg to go up 2 stairs at home   · Transferred to [facility] for continuity of care   · Likely 2/2 physical decondition in the setting of recurrent endometrial cancer   · Monitor hemoglobin and transfuse prn   · Fall precautions  · PT/OT consults placed - recommending rehab   · Patient had refused rehab on prior admission but is now agreeable, case management following        [facility] Internal Medicine Progress Note  Patient: Dwaine Gutierrez 60 y o  female   MRN: [id number]  PCP: Erik Richardson DO  Unit/Bed#: [facility] 910-01 Encounter: [id number]  Date Of Visit: 10/03/19    Assessment:    Principal Problem:    Generalized weakness  Active Problems:    Benign essential hypertension    Endometrial cancer ([facility] [address] [facility] )    Acute deep vein thrombosis (DVT) of proximal vein of lower extremity (HCC)    Anemia due to chemotherapy    MSSA bacteremia    Swelling of left upper extremity    Bilateral lower extremity edema    Moderate protein-calorie malnutrition (HCC)        VTE Pharmacologic Prophylaxis:   Pharmacologic: Enoxaparin (Lovenox)  Mechanical VTE Prophylaxis in Place: Yes    Patient Centered Rounds: I have performed bedside rounds with nursing staff today  Discussions with Specialists or Other Care Team Provider: GYN Onc    Education and Discussions with Family / Patient: patient    Time Spent for Care: 30 minutes  More than 50% of total time spent on counseling and coordination of care as described above  Current Length of Stay: 1 day(s)    Current Patient Status: Inpatient   Certification Statement: The patient will continue to require additional inpatient hospital stay due to monitor Hb    Discharge Plan / Estimated Discharge Date: once hb stable    Code Status: Level 1 - Full Code      Subjective:   Feels ok, no complains    Objective:     Vitals:   Temp (24hrs), Av 2 °F (36 8 °C), Min:97 8 °F (36 6 °C), Max:98 6 °F (37 °C)    Temp:  [97 8 °F (36 6 °C)-98 6 °F (37 °C)] 98 6 °F (37 °C)  HR:  [82-92] 92  Resp:  [16-18] 16  BP: (148-153)/(81-85) 151/82  SpO2:  [98 %-100 %] 100 %  Body mass index is 39 63 kg/m²  Input and Output Summary (last 24 hours): Intake/Output Summary (Last 24 hours) at 10/3/2019 1539  Last data filed at 10/3/2019 1300  Gross per 24 hour   Intake 470 ml   Output 1725 ml   Net -1255 ml       Physical Exam:     Physical Exam   Constitutional: She is oriented to person, place, and time  She appears well-developed and well-nourished  HENT:   Head: Normocephalic and atraumatic  Mouth/Throat: Oropharynx is clear and moist    Eyes: Conjunctivae are normal    Cardiovascular: Normal rate and regular rhythm  Exam reveals no friction rub  No murmur heard    Pulmonary/Chest: Effort normal and breath sounds normal  No stridor  No respiratory distress  Abdominal: Soft  She exhibits no distension  There is no tenderness  Musculoskeletal: She exhibits no tenderness  Neurological: She is alert and oriented to person, place, and time  Vitals reviewed  Additional Data:     Labs:    Results from last 7 days   Lab Units 10/03/19  1207 10/03/19  0536  10/02/19  0545   WBC Thousand/uL  --  7 86  --  7 71   HEMOGLOBIN g/dL 7 6* 7 5*   < > 6 7*   HEMATOCRIT % 23 7* 22 9*   < > 21 1*   PLATELETS Thousands/uL  --  133*  --  134*   NEUTROS PCT %  --   --   --  64   LYMPHS PCT %  --   --   --  17   MONOS PCT %  --   --   --  11   EOS PCT %  --   --   --  1    < > = values in this interval not displayed  Results from last 7 days   Lab Units 10/02/19  0545   POTASSIUM mmol/L 4 4   CHLORIDE mmol/L 105   CO2 mmol/L 18*   BUN mg/dL 22   CREATININE mg/dL 0 96   CALCIUM mg/dL 8 5           * I Have Reviewed All Lab Data Listed Above  * Additional Pertinent Lab Tests Reviewed:  All Labs Within Last 24 Hours Reviewed    Imaging:    Imaging Reports Reviewed Today Include:   Imaging Personally Reviewed by Myself Includes:      Recent Cultures (last 7 days):           Last 24 Hours Medication List:     Current Facility-Administered Medications:  acetaminophen 650 mg Oral Q6H PRN Marvemyranda Castro, PA-C    ARIPiprazole 10 mg Oral Daily Emperatriz E Held, PA-C    aspirin 81 mg Oral Daily Emperatriz E Held, PA-C    benzonatate 200 mg Oral TID PRN Emperatriz E Held, PA-C    ceFAZolin 2,000 mg Intravenous Q8H Emperatriz E Held, PA-C Last Rate: 2,000 mg (10/03/19 1977)   cholecalciferol 1,000 Units Oral Daily Emperatriz E Held, PA-C    enoxaparin 90 mg Subcutaneous Q12H [address] Emperatriz E Held, PA-C    LORazepam 1 mg Oral Q6H PRN Emperatriz E Held, PA-C    pantoprazole 40 mg Oral Early Morning Emperatriz E Held, PA-C    saccharomyces boulardii 250 mg Oral BID Sylwia Cobb, PA-C    venlafaxine 75 mg Oral TID Emperatriz E Held, PA-C         Today, Patient Was Seen By: Aditya Elise MD    ** Please Note: This note has been constructed using a voice recognition system   **

## 2019-10-03 NOTE — ASSESSMENT & PLAN NOTE
· Pt has noted LUE pain and swelling since unsuccessful attempt at PICC placement in that arm  · Check LUE duplex - no evidence of acute or chronic DVT of left upper extremity  No evidence of superficial thrombophlebitis  There is evidence of a non-vascularized structure in the area of the bicep muscle  It did note acute thrombus in IJ vein on the right  ·  These results have been discussed with her previously and felt to be related to port infection  No further changes to the plan at this time    · xarelto was already changed to lovenox last admission  · PRN pain control   · Trial of compression sleeve for edema management

## 2019-10-03 NOTE — RESTORATIVE TECHNICIAN NOTE
Restorative Specialist Mobility Note       Activity: Ambulate in mehta, Stand at bedside, Ambulate in room, Chair(Pt was educated about activity and benefits   Pt was returned to room in chair with call bell and tray within reach )     Assistive Device: Front wheel walker(HHAx1)          [facility]

## 2019-10-03 NOTE — ASSESSMENT & PLAN NOTE
· Pt reports to chronic b/l LE edema    Previously was on lasix but this was held by PCP given GI losses (was having diarrhea associated with chemo)  · Pt doesn't think she is ready for it yet  · Would recommend to elevate extremities, compression stockings

## 2019-10-04 ENCOUNTER — APPOINTMENT (INPATIENT)
Dept: RADIOLOGY | Facility: HOSPITAL | Age: 64
DRG: 982 | End: 2019-10-04
Attending: HOSPITALIST
Payer: COMMERCIAL

## 2019-10-04 ENCOUNTER — HOSPITAL ENCOUNTER (OUTPATIENT)
Dept: INFUSION CENTER | Facility: CLINIC | Age: 64
End: 2019-10-04

## 2019-10-04 LAB
ABO GROUP BLD BPU: NORMAL
ANISOCYTOSIS BLD QL SMEAR: PRESENT
BASOPHILS # BLD MANUAL: 0 THOUSAND/UL (ref 0–0.1)
BASOPHILS NFR MAR MANUAL: 0 % (ref 0–1)
BPU ID: NORMAL
CROSSMATCH: NORMAL
EOSINOPHIL # BLD MANUAL: 0.09 THOUSAND/UL (ref 0–0.4)
EOSINOPHIL NFR BLD MANUAL: 1 % (ref 0–6)
ERYTHROCYTE [DISTWIDTH] IN BLOOD BY AUTOMATED COUNT: 17.2 % (ref 11.6–15.1)
HCT VFR BLD AUTO: 25.3 % (ref 34.8–46.1)
HCT VFR BLD AUTO: 28.3 % (ref 34.8–46.1)
HEMOCCULT STL QL: NEGATIVE
HGB BLD-MCNC: 8.2 G/DL (ref 11.5–15.4)
HGB BLD-MCNC: 9.2 G/DL (ref 11.5–15.4)
LYMPHOCYTES # BLD AUTO: 1.08 THOUSAND/UL (ref 0.6–4.47)
LYMPHOCYTES # BLD AUTO: 12 % (ref 14–44)
MCH RBC QN AUTO: 28.1 PG (ref 26.8–34.3)
MCHC RBC AUTO-ENTMCNC: 32.5 G/DL (ref 31.4–37.4)
MCV RBC AUTO: 87 FL (ref 82–98)
METAMYELOCYTES NFR BLD MANUAL: 2 % (ref 0–1)
MONOCYTES # BLD AUTO: 0.63 THOUSAND/UL (ref 0–1.22)
MONOCYTES NFR BLD: 7 % (ref 4–12)
NEUTROPHILS # BLD MANUAL: 6.99 THOUSAND/UL (ref 1.85–7.62)
NEUTS BAND NFR BLD MANUAL: 1 % (ref 0–8)
NEUTS SEG NFR BLD AUTO: 77 % (ref 43–75)
NRBC BLD AUTO-RTO: 0 /100 WBCS
PLATELET # BLD AUTO: 144 THOUSANDS/UL (ref 149–390)
PLATELET BLD QL SMEAR: ABNORMAL
PMV BLD AUTO: 10.3 FL (ref 8.9–12.7)
POIKILOCYTOSIS BLD QL SMEAR: PRESENT
RBC # BLD AUTO: 3.27 MILLION/UL (ref 3.81–5.12)
RBC MORPH BLD: PRESENT
UNIT DISPENSE STATUS: NORMAL
UNIT PRODUCT CODE: NORMAL
UNIT RH: NORMAL
WBC # BLD AUTO: 8.96 THOUSAND/UL (ref 4.31–10.16)

## 2019-10-04 PROCEDURE — 97530 THERAPEUTIC ACTIVITIES: CPT

## 2019-10-04 PROCEDURE — 85027 COMPLETE CBC AUTOMATED: CPT | Performed by: HOSPITALIST

## 2019-10-04 PROCEDURE — 50387 CHANGE NEPHROURETERAL CATH: CPT

## 2019-10-04 PROCEDURE — C1769 GUIDE WIRE: HCPCS

## 2019-10-04 PROCEDURE — 82272 OCCULT BLD FECES 1-3 TESTS: CPT | Performed by: HOSPITALIST

## 2019-10-04 PROCEDURE — 50387 CHANGE NEPHROURETERAL CATH: CPT | Performed by: RADIOLOGY

## 2019-10-04 PROCEDURE — 85018 HEMOGLOBIN: CPT | Performed by: HOSPITALIST

## 2019-10-04 PROCEDURE — 0T733DZ DILATION OF RIGHT KIDNEY PELVIS WITH INTRALUMINAL DEVICE, PERCUTANEOUS APPROACH: ICD-10-PCS | Performed by: INTERNAL MEDICINE

## 2019-10-04 PROCEDURE — 99232 SBSQ HOSP IP/OBS MODERATE 35: CPT | Performed by: HOSPITALIST

## 2019-10-04 PROCEDURE — 85014 HEMATOCRIT: CPT | Performed by: HOSPITALIST

## 2019-10-04 PROCEDURE — 0TP53DZ REMOVAL OF INTRALUMINAL DEVICE FROM KIDNEY, PERCUTANEOUS APPROACH: ICD-10-PCS | Performed by: INTERNAL MEDICINE

## 2019-10-04 PROCEDURE — 85007 BL SMEAR W/DIFF WBC COUNT: CPT | Performed by: HOSPITALIST

## 2019-10-04 PROCEDURE — BT1B1ZZ FLUOROSCOPY OF BLADDER AND URETHRA USING LOW OSMOLAR CONTRAST: ICD-10-PCS | Performed by: INTERNAL MEDICINE

## 2019-10-04 RX ADMIN — VENLAFAXINE 75 MG: 37.5 TABLET ORAL at 15:48

## 2019-10-04 RX ADMIN — CEFAZOLIN SODIUM 2000 MG: 2 SOLUTION INTRAVENOUS at 23:01

## 2019-10-04 RX ADMIN — Medication 250 MG: at 08:47

## 2019-10-04 RX ADMIN — IOHEXOL 15 ML: 350 INJECTION, SOLUTION INTRAVENOUS at 10:50

## 2019-10-04 RX ADMIN — ENOXAPARIN SODIUM 90 MG: 100 INJECTION SUBCUTANEOUS at 23:01

## 2019-10-04 RX ADMIN — ACETAMINOPHEN 650 MG: 325 TABLET ORAL at 23:01

## 2019-10-04 RX ADMIN — CEFAZOLIN SODIUM 2000 MG: 2 SOLUTION INTRAVENOUS at 08:46

## 2019-10-04 RX ADMIN — CEFAZOLIN SODIUM 2000 MG: 2 SOLUTION INTRAVENOUS at 15:48

## 2019-10-04 RX ADMIN — ACETAMINOPHEN 650 MG: 325 TABLET ORAL at 13:58

## 2019-10-04 RX ADMIN — VENLAFAXINE 75 MG: 37.5 TABLET ORAL at 23:01

## 2019-10-04 RX ADMIN — ENOXAPARIN SODIUM 90 MG: 100 INJECTION SUBCUTANEOUS at 08:47

## 2019-10-04 RX ADMIN — ARIPIPRAZOLE 10 MG: 10 TABLET ORAL at 08:47

## 2019-10-04 RX ADMIN — PANTOPRAZOLE SODIUM 40 MG: 40 TABLET, DELAYED RELEASE ORAL at 05:45

## 2019-10-04 RX ADMIN — ASPIRIN 81 MG: 81 TABLET, COATED ORAL at 08:47

## 2019-10-04 RX ADMIN — MELATONIN 1000 UNITS: at 08:47

## 2019-10-04 RX ADMIN — VENLAFAXINE 75 MG: 37.5 TABLET ORAL at 08:47

## 2019-10-04 RX ADMIN — Medication 250 MG: at 18:18

## 2019-10-04 RX ADMIN — LORAZEPAM 1 MG: 1 TABLET ORAL at 18:41

## 2019-10-04 NOTE — SOCIAL WORK
PT fgr disch tomorrow  Nayeli liaison at [facility] notified  [employer] is still good from insurer    Spoke with pt, she thinks her sister can transport her  She will advise    TCT Pedro Dolan, they can provide w/c transport at 1PM tomorrow    PT aware, [facility] aware

## 2019-10-04 NOTE — ASSESSMENT & PLAN NOTE
· Discharged from [facility] 9/29 with recommended home PT 3-5x/week however represented to [facility] ED after being unable to lift her right leg to go up 2 stairs at home   · Transferred to [facility] for continuity of care   · Likely 2/2 physical decondition in the setting of recurrent endometrial cancer   · Monitor hemoglobin and transfuse prn   · Fall precautions  · PT/OT consults placed - recommending rehab   · Patient had refused rehab on prior admission but is now agreeable, case management following

## 2019-10-04 NOTE — SPEECH THERAPY NOTE
Speech Language/Pathology  Pt last seen by ST 9/30  Spoke w/ nurse who reported pt has been eating  and doing well  Spoke w/ pt and family who reported same  Denies any dififuclty w/ chewing or swallowing and states her appettie is "eh"  Just came back from doing stairs w/ PT  "that was tiring"  Speech will d/c  Please notify/reconsult if status changes or concerns arise on regular diet/thin liquids

## 2019-10-04 NOTE — PROGRESS NOTES
Progress Note - Joseluis Melendez 1955, 60 y o  female MRN: [id number]    Unit/Bed#: [facility] 910-01 Encounter: [id number]    Primary Care Provider: Hedy Vaughn DO   Date and time admitted to hospital: 9/29/2019 10:04 PM        Moderate protein-calorie malnutrition ([address] )  Assessment & Plan  Malnutrition Findings:   Malnutrition type: Chronic illness(in the context of nutrition impact symptoms from cancer and cancer txs and recent extended in pt admit resulting in wt loss and sub-optimal po intake)  Degree of Malnutrition: Malnutrition of moderate degree    BMI Findings: Body mass index is 39 63 kg/m²  Bilateral lower extremity edema  Assessment & Plan  · Pt reports to chronic b/l LE edema  Previously was on lasix but this was held by PCP given GI losses (was having diarrhea associated with chemo)  · Pt doesn't think she is ready for it yet  · Would recommend to elevate extremities, compression stockings     Swelling of left upper extremity  Assessment & Plan  · Pt has noted LUE pain and swelling since unsuccessful attempt at PICC placement in that arm  · Check LUE duplex - no evidence of acute or chronic DVT of left upper extremity  No evidence of superficial thrombophlebitis  There is evidence of a non-vascularized structure in the area of the bicep muscle  It did note acute thrombus in IJ vein on the right  ·  These results have been discussed with her previously and felt to be related to port infection  No further changes to the plan at this time    · xarelto was already changed to lovenox last admission  · PRN pain control   · Trial of compression sleeve for edema management     MSSA bacteremia  Assessment & Plan  · In the setting of port a cath and was admitted 9/19-9/29 with sepsis   · Catheter removed on 9/20 now has R PICC placed 9/27  · Repeat cultures have been negative for > 5 days   · TRINH without vegetation  · ID recommended 6 weeks of abx - on day #15 of abx, Cefazolin day #13    Anemia due to chemotherapy  Assessment & Plan  · Hb 6 7 s/p 1 unit of PRBC - 8 2 - 7 5  · When 7 5 & 7 5 repeated 1  uPRBC - now 9 2 but on repeat 8 2  Stool occult neg  · Monitor urine color   If lower in am check CT abdomen  · Received transfusion last week as well  · Had EGD on 9/24 without evidence of source of blood loss    Acute deep vein thrombosis (DVT) of proximal vein of lower extremity (HCC)  Assessment & Plan  · GYN Onc team switched from Eliquis to Lovenox BID permanently  · Consider repeat US as outpatient in 2 weeks or so    Endometrial cancer [facility])  Assessment & Plan  · Appreciate Gynecologic Oncology evaluation on recent admission   · She received Taxol/carboplatin and received last chemotherapy 09/09/2019  · Received granix 08/31/2019, also Neulasta 09/09/2019 and 09/16/2019  · Chemotherapy to be on hold for now given acute infection  · CT CAP - shows some improvement in areas of malignancy, R>L pleural effusion, nodules in lung which can be infectious/inflammatory       Benign essential hypertension  Assessment & Plan  · BP acceptbale, monitor routinely   · Continue home medical management     * Generalized weakness  Assessment & Plan  · Discharged from [facility] 9/29 with recommended home PT 3-5x/week however represented to [facility] ED after being unable to lift her right leg to go up 2 stairs at home   · Transferred to [facility] for continuity of care   · Likely 2/2 physical decondition in the setting of recurrent endometrial cancer   · Monitor hemoglobin and transfuse prn   · Fall precautions  · PT/OT consults placed - recommending rehab   · Patient had refused rehab on prior admission but is now agreeable, case management following        [facility] Internal Medicine Progress Note  Patient: Marleni Carey 60 y o  female   MRN: [id number]  PCP: Jaylon Dubois DO  Unit/Bed#: [facility] 910-01 Encounter: [id number]  Date Of Visit: 10/04/19    Assessment:    Principal Problem:    Generalized weakness  Active Problems:    Benign essential hypertension    Endometrial cancer (HCC)    Acute deep vein thrombosis (DVT) of proximal vein of lower extremity (HCC)    Anemia due to chemotherapy    MSSA bacteremia    Swelling of left upper extremity    Bilateral lower extremity edema    Moderate protein-calorie malnutrition (HCC)           VTE Pharmacologic Prophylaxis:   Pharmacologic: Enoxaparin (Lovenox)  Mechanical VTE Prophylaxis in Place: Yes    Patient Centered Rounds: I have performed bedside rounds with nursing staff today  Discussions with Specialists or Other Care Team Provider:     Education and Discussions with Family / Patient: presleysister    Time Spent for Care: 30 minutes  More than 50% of total time spent on counseling and coordination of care as described above  Current Length of Stay: 2 day(s)    Current Patient Status: Inpatient   Certification Statement: The patient will continue to require additional inpatient hospital stay due to monitor hb    Discharge Plan / Estimated Discharge Date: hope tomorrow    Code Status: Level 1 - Full Code      Subjective:   She feels ok except tired, no bleeding    Objective:     Vitals:   Temp (24hrs), Av 9 °F (37 2 °C), Min:97 8 °F (36 6 °C), Max:99 5 °F (37 5 °C)    Temp:  [97 8 °F (36 6 °C)-99 5 °F (37 5 °C)] 98 6 °F (37 °C)  HR:  [] 79  Resp:  [16-18] 18  BP: (147-154)/(85-90) 154/86  SpO2:  [95 %-100 %] 96 %  Body mass index is 39 63 kg/m²  Input and Output Summary (last 24 hours): Intake/Output Summary (Last 24 hours) at 10/4/2019 1746  Last data filed at 10/4/2019 1038  Gross per 24 hour   Intake 1440 ml   Output 625 ml   Net 815 ml       Physical Exam:     Physical Exam   Constitutional: She is oriented to person, place, and time  She appears well-developed and well-nourished  HENT:   Head: Normocephalic and atraumatic  Eyes: Conjunctivae are normal    Cardiovascular: Normal rate and regular rhythm   Exam reveals no friction rub  No murmur heard  Pulmonary/Chest: Effort normal and breath sounds normal  No stridor  No respiratory distress  Abdominal: Soft  She exhibits no distension  There is no tenderness  Musculoskeletal: She exhibits no tenderness  Neurological: She is alert and oriented to person, place, and time  Vitals reviewed  Additional Data:     Labs:    Results from last 7 days   Lab Units 10/04/19  1548 10/04/19  0607  10/02/19  0545   WBC Thousand/uL  --  8 96   < > 7 71   HEMOGLOBIN g/dL 8 2* 9 2*   < > 6 7*   HEMATOCRIT % 25 3* 28 3*   < > 21 1*   PLATELETS Thousands/uL  --  144*   < > 134*   NEUTROS PCT %  --   --   --  64   LYMPHS PCT %  --   --   --  17   LYMPHO PCT %  --  12*  --   --    MONOS PCT %  --   --   --  11   MONO PCT %  --  7  --   --    EOS PCT %  --  1  --  1    < > = values in this interval not displayed  Results from last 7 days   Lab Units 10/02/19  0545   POTASSIUM mmol/L 4 4   CHLORIDE mmol/L 105   CO2 mmol/L 18*   BUN mg/dL 22   CREATININE mg/dL 0 96   CALCIUM mg/dL 8 5           * I Have Reviewed All Lab Data Listed Above  * Additional Pertinent Lab Tests Reviewed:  All Labs Within Last 24 Hours Reviewed    Imaging:    Imaging Reports Reviewed Today Include:   Imaging Personally Reviewed by Myself Includes:      Recent Cultures (last 7 days):           Last 24 Hours Medication List:     Current Facility-Administered Medications:  acetaminophen 650 mg Oral Q6H PRN Torey Heal, PA-C    ARIPiprazole 10 mg Oral Daily Emperatriz E Held, PA-C    aspirin 81 mg Oral Daily Emperatriz E Held, PA-C    benzonatate 200 mg Oral TID PRN Emperatriz E Held, PA-C    ceFAZolin 2,000 mg Intravenous Q8H Emperatriz E Held, PA-C Last Rate: 2,000 mg (10/04/19 1548)   cholecalciferol 1,000 Units Oral Daily Emperatriz E Held, PA-C    enoxaparin 90 mg Subcutaneous Q12H [address] Emperatriz E Held, PA-C    LORazepam 1 mg Oral Q6H PRN Emperatriz E Held, PA-C    pantoprazole 40 mg Oral Early Morning Emperatriz E Held, PA-C    radha boulardii 250 mg Oral BID Sylwia Cobb PA-C    venlafaxine 75 mg Oral TID Veronica Rosas PA-C         Today, Patient Was Seen By: Goran Patel MD    ** Please Note: This note has been constructed using a voice recognition system   **

## 2019-10-04 NOTE — PLAN OF CARE
Problem: PHYSICAL THERAPY ADULT  Goal: Performs mobility at highest level of function for planned discharge setting  See evaluation for individualized goals  Description  Treatment/Interventions: Functional transfer training, LE strengthening/ROM, Elevations, Therapeutic exercise, Endurance training, Patient/family training, Equipment eval/education, Gait training, Spoke to nursing  Equipment Recommended: Thierno Fry       See flowsheet documentation for full assessment, interventions and recommendations  Outcome: Progressing  Note:   Prognosis: Good  Problem List: Decreased strength, Decreased endurance, Impaired balance, Decreased mobility  Assessment: Pt presents with decreased mobility, strength, and endurance/activity tolerance  Pt demonstrated ability to perform STS at [address]  Pt ambulated 20 ft X2 with RW at [address]  Pt ascended/descended 7 steps at [address] w/ one HR and step to pattern  Pt required more assistance towards end of session due to fatigue  Pt with improved mobility this session being able to perform stairs, ambulation, and therex program  Pt continues to benefit from skilled therapy to maximize functional independence  Recommendation at this time is rehab  Pt would benefit from continued ambulation, functional transfers, stairs, and endurance/activity tolerance  Barriers to Discharge: Inaccessible home environment     Recommendation: (rehab)     PT - OK to Discharge: (when medically stable to rehab)    See flowsheet documentation for full assessment

## 2019-10-04 NOTE — ASSESSMENT & PLAN NOTE
· In the setting of port a cath and was admitted 9/19-9/29 with sepsis   · Catheter removed on 9/20 now has R PICC placed 9/27  · Repeat cultures have been negative for > 5 days   · TRINH without vegetation  · ID recommended 6 weeks of abx - on day #15 of abx, Cefazolin day #13

## 2019-10-04 NOTE — RESTORATIVE TECHNICIAN NOTE
Restorative Specialist Mobility Note       Activity: Other (Comment)(Educated/encouraged pt to ambulate with assistance 3-4 x's/day, pt refused 2* c/o pain in nephrostomy nursing aware   Pt callbell, phone/tray within reach )      Shireen SANTILLAN, Restorative Technician, [employer]

## 2019-10-04 NOTE — PLAN OF CARE
Problem: OCCUPATIONAL THERAPY ADULT  Goal: Performs self-care activities at highest level of function for planned discharge setting  See evaluation for individualized goals  Description  Treatment Interventions: ADL retraining, Functional transfer training, UE strengthening/ROM, Endurance training, Patient/family training, Equipment evaluation/education, Energy conservation, Activityengagement  Equipment Recommended: Bedside commode     PROGRESSING:    See flowsheet documentation for full assessment, interventions and recommendations  Note:   Limitation: Decreased ADL status, Decreased UE ROM, Decreased UE strength, Decreased endurance, Decreased self-care trans, Decreased high-level ADLs  Prognosis: Good  Assessment: Patient participated in skilled OT with focus on bed mobility skills, activity endurance, EOB sitting tolerance/endurance, transfer and functional mobiilty skill training, adl/self care task performance techniques  Patient presents supine in bed agreeable to engage in OT  Patient identified by name and   Patient requries assist levels as documented, intermittent vc's for techniques, and education in adaptive techniques for consistent carryover  Patient would benefit from [address] with focus on increasing functional strength and endurance, increasing functional independence and safety with functional mobility and transfers, increase functional independence with adl/self care tasks for carryover into her daily routine        OT Discharge Recommendation: Short Term Rehab  OT - OK to Discharge: (when medically cleared)  Tish Palma

## 2019-10-04 NOTE — PLAN OF CARE
Problem: Prexisting or High Potential for Compromised Skin Integrity  Goal: Skin integrity is maintained or improved  Description  INTERVENTIONS:  - Identify patients at risk for skin breakdown  - Assess and monitor skin integrity  - Assess and monitor nutrition and hydration status  - Monitor labs   - Assess for incontinence   - Turn and reposition patient  - Assist with mobility/ambulation  - Relieve pressure over bony prominences  - Avoid friction and shearing  - Provide appropriate hygiene as needed including keeping skin clean and dry  - Evaluate need for skin moisturizer/barrier cream  - Collaborate with interdisciplinary team   - Patient/family teaching  - Consider wound care consult   Outcome: Progressing     Problem: Potential for Falls  Goal: Patient will remain free of falls  Description  INTERVENTIONS:  - Assess patient frequently for physical needs  -  Identify cognitive and physical deficits and behaviors that affect risk of falls  -  [place] fall precautions as indicated by assessment   - Educate patient/family on patient safety including physical limitations  - Instruct patient to call for assistance with activity based on assessment  - Modify environment to reduce risk of injury  - Consider OT/PT consult to assist with strengthening/mobility  Outcome: Progressing     Problem: Nutrition/Hydration-ADULT  Goal: Nutrient/Hydration intake appropriate for improving, restoring or maintaining nutritional needs  Description  Monitor and assess patient's nutrition/hydration status for malnutrition  Collaborate with interdisciplinary team and initiate plan and interventions as ordered  Monitor patient's weight and dietary intake as ordered or per policy  Utilize nutrition screening tool and intervene as necessary  Determine patient's food preferences and provide high-protein, high-caloric foods as appropriate       INTERVENTIONS:  - Monitor oral intake, urinary output, labs, and treatment plans  - Assess nutrition and hydration status and recommend course of action  - Evaluate amount of meals eaten  - Assist patient with eating if necessary   - Allow adequate time for meals  - Recommend/ encourage appropriate diets, oral nutritional supplements, and vitamin/mineral supplements  - Order, calculate, and assess calorie counts as needed  - Recommend, monitor, and adjust tube feedings and TPN/PPN based on assessed needs  - Assess need for intravenous fluids  - Provide specific nutrition/hydration education as appropriate  - Include patient/family/caregiver in decisions related to nutrition  Outcome: Progressing     Problem: PAIN - ADULT  Goal: Verbalizes/displays adequate comfort level or baseline comfort level  Description  Interventions:  - Encourage patient to monitor pain and request assistance  - Assess pain using appropriate pain scale  - Administer analgesics based on type and severity of pain and evaluate response  - Implement non-pharmacological measures as appropriate and evaluate response  - Consider cultural and social influences on pain and pain management  - Notify physician/advanced practitioner if interventions unsuccessful or patient reports new pain  Outcome: Progressing     Problem: INFECTION - ADULT  Goal: Absence or prevention of progression during hospitalization  Description  INTERVENTIONS:  - Assess and monitor for signs and symptoms of infection  - Monitor lab/diagnostic results  - Monitor all insertion sites, i e  indwelling lines, tubes, and drains  - Monitor endotracheal if appropriate and nasal secretions for changes in amount and color  - [place] appropriate cooling/warming therapies per order  - Administer medications as ordered  - Instruct and encourage patient and family to use good hand hygiene technique  - Identify and instruct in appropriate isolation precautions for identified infection/condition  Outcome: Progressing     Problem: SAFETY ADULT  Goal: Maintain or return to baseline ADL function  Description  INTERVENTIONS:  -  Assess patient's ability to carry out ADLs; assess patient's baseline for ADL function and identify physical deficits which impact ability to perform ADLs (bathing, care of mouth/teeth, toileting, grooming, dressing, etc )  - Assess/evaluate cause of self-care deficits   - Assess range of motion  - Assess patient's mobility; develop plan if impaired  - Assess patient's need for assistive devices and provide as appropriate  - Encourage maximum independence but intervene and supervise when necessary  - Involve family in performance of ADLs  - Assess for home care needs following discharge   - Consider OT consult to assist with ADL evaluation and planning for discharge  - Provide patient education as appropriate  Outcome: Progressing  Goal: Maintain or return mobility status to optimal level  Description  INTERVENTIONS:  - Assess patient's baseline mobility status (ambulation, transfers, stairs, etc )    - Identify cognitive and physical deficits and behaviors that affect mobility  - Identify mobility aids required to assist with transfers and/or ambulation (gait belt, sit-to-stand, lift, walker, cane, etc )  - [place] fall precautions as indicated by assessment  - Record patient progress and toleration of activity level on Mobility SBAR; progress patient to next Phase/Stage  - Instruct patient to call for assistance with activity based on assessment  - Consider rehabilitation consult to assist with strengthening/weightbearing, etc   Outcome: Progressing     Problem: DISCHARGE PLANNING  Goal: Discharge to home or other facility with appropriate resources  Description  INTERVENTIONS:  - Identify barriers to discharge w/patient and caregiver  - Arrange for needed discharge resources and transportation as appropriate  - Identify discharge learning needs (meds, wound care, etc )  - Arrange for interpretive services to assist at discharge as needed  - Refer to Case Management Department for coordinating discharge planning if the patient needs post-hospital services based on physician/advanced practitioner order or complex needs related to functional status, cognitive ability, or social support system  Outcome: Progressing

## 2019-10-04 NOTE — PHYSICAL THERAPY NOTE
PHYSICAL THERAPY NOTE      Patient Name: Margarito Devine  [id number] Date: 10/4/2019     10/04/19 1138   Pain Assessment   Pain Assessment No/denies pain   Pain Score No Pain   Restrictions/Precautions   Weight Bearing Precautions Per Order No   Other Precautions Telemetry; Fall Risk   General   Chart Reviewed Yes   Response to Previous Treatment Patient with no complaints from previous session  Family/Caregiver Present No   Cognition   Overall Cognitive Status WFL   Arousal/Participation Alert; Cooperative   Attention Within functional limits   Orientation Level Oriented X4   Memory Within functional limits   Following Commands Follows all commands and directions without difficulty   Subjective   Subjective "I just got back from a procedure"   Bed Mobility   Additional Comments pt OOB upon arrival and returned to chair at end of session with all needs within reach   Transfers   Sit to Stand 5  Supervision   Additional items Assist x 1; Increased time required;Verbal cues   Stand to Sit 5  Supervision   Additional items Assist x 1; Increased time required;Verbal cues   Ambulation/Elevation   Gait pattern Excessively slow; Short stride   Gait Assistance 5  Supervision   Additional items Assist x 1;Verbal cues   Assistive Device Rolling walker   Distance 20ft X2   Stair Management Assistance 4  Minimal assist   Additional items Assist x 1;Verbal cues; Increased time required   Stair Management Technique One rail R;Step to pattern; Foreward   Number of Stairs 7   Balance   Static Sitting Good   Static Standing Fair   Ambulatory Fair -   Endurance Deficit   Endurance Deficit Yes   Endurance Deficit Description fatigue, weakness   Activity Tolerance   Activity Tolerance Patient limited by fatigue   Nurse Made Aware RN cleared pt for PT   Exercises   Knee AROM Long Arc Quad Sitting;Bilateral;20 reps   Ankle Pumps Sitting;Bilateral;20 reps Assessment   Prognosis Good   Problem List Decreased strength;Decreased endurance; Impaired balance;Decreased mobility   Assessment Pt presents with decreased mobility, strength, and endurance/activity tolerance  Pt demonstrated ability to perform STS at [address]  Pt ambulated 20 ft X2 with RW at [address]  Pt ascended/descended 7 steps at [address] w/ one HR and step to pattern  Pt required more assistance towards end of session due to fatigue  Pt with improved mobility this session being able to perform stairs, ambulation, and therex program  Pt continues to benefit from skilled therapy to maximize functional independence  Recommendation at this time is rehab  Pt would benefit from continued ambulation, functional transfers, stairs, and endurance/activity tolerance  Barriers to Discharge Inaccessible home environment   Goals   Patient Goals to go home   STG Expiration Date 10/09/19   PT Treatment Day 2   Plan   Treatment/Interventions Functional transfer training;LE strengthening/ROM; Elevations; Therapeutic exercise; Endurance training;Patient/family training;Equipment eval/education;Gait training;Spoke to nursing   Progress Progressing toward goals   PT Frequency   (3-5x/week)   Recommendation   Recommendation   (rehab)   Equipment Recommended Mat Prader   PT - OK to Discharge   (when medically stable to rehab)     Sosa Alfred, PT, DPT

## 2019-10-04 NOTE — ASSESSMENT & PLAN NOTE
· Hb 6 7 s/p 1 unit of PRBC - 8 2 - 7 5  · When 7 5 & 7 5 repeated 1  uPRBC - now 9 2 but on repeat 8 2  Stool occult neg  · Monitor urine color   If lower in am check CT abdomen  · Received transfusion last week as well  · Had EGD on 9/24 without evidence of source of blood loss

## 2019-10-04 NOTE — OCCUPATIONAL THERAPY NOTE
Occupational Therapy Treatment Note:       10/04/19 1543   Restrictions/Precautions   Other Precautions Telemetry; Fall Risk   Pain Assessment   Pain Assessment 0-10   Pain Score No Pain   ADL   Eating Assistance 6  Modified independent   Eating Deficit Setup   Grooming Assistance 4  Minimal Assistance   UB Bathing Assistance 4  Minimal Assistance   LB Bathing Assistance 3  Moderate Assistance   UB Dressing Assistance 4  Minimal Assistance   LB Dressing Assistance 3  Moderate Assistance   Bed Mobility   Supine to Sit 3  Moderate assistance   Additional items Assist x 1;HOB elevated; Increased time required;Verbal cues;LE management   Sit to Supine 3  Moderate assistance   Additional items Assist x 1; Increased time required;Verbal cues;LE management   Transfers   Sit to Stand 4  Minimal assistance   Additional items Assist x 1; Increased time required;Verbal cues   Stand to Sit 4  Minimal assistance   Additional items Assist x 1; Increased time required;Verbal cues   Stand pivot 5  Supervision   Additional items Assist x 1   Functional Mobility   Functional Mobility 5  Supervision   Additional Comments   (within room and turn around in hallway)   Additional items Rolling walker   Cognition   Overall Cognitive Status [facility]   Arousal/Participation Alert; Cooperative   Attention Within functional limits   Orientation Level Oriented X4   Memory Within functional limits   Following Commands Follows all commands and directions without difficulty   Activity Tolerance   Activity Tolerance Patient tolerated treatment well   Medical Staff Made Aware patient cleared for OT by RN   Assessment   Assessment Patient participated in skilled OT with focus on bed mobility skills, activity endurance, EOB sitting tolerance/endurance, transfer and functional mobiilty skill training, adl/self care task performance techniques  Patient presents supine in bed agreeable to engage in OT  Patient identified by name and    Patient requries assist levels as documented, intermittent vc's for techniques, and education in adaptive techniques for consistent carryover  Patient would benefit from [address] with focus on increasing functional strength and endurance, increasing functional independence and safety with functional mobility and transfers, increase functional independence with adl/self care tasks for carryover into her daily routine  Plan   Treatment Interventions ADL retraining;Functional transfer training; Endurance training   Goal Expiration Date 10/14/19   OT Treatment Day 2   OT Frequency 3-5x/wk   Recommendation   OT Discharge Recommendation Short Term Rehab   OT - OK to Discharge   (when medically cleared)   [place] Yesy

## 2019-10-05 PROBLEM — D64.9 ANEMIA: Status: ACTIVE | Noted: 2019-09-06

## 2019-10-05 LAB
BASOPHILS # BLD AUTO: 0.04 THOUSANDS/ΜL (ref 0–0.1)
BASOPHILS NFR BLD AUTO: 1 % (ref 0–1)
EOSINOPHIL # BLD AUTO: 0.05 THOUSAND/ΜL (ref 0–0.61)
EOSINOPHIL NFR BLD AUTO: 1 % (ref 0–6)
ERYTHROCYTE [DISTWIDTH] IN BLOOD BY AUTOMATED COUNT: 17.1 % (ref 11.6–15.1)
HCT VFR BLD AUTO: 24.8 % (ref 34.8–46.1)
HCT VFR BLD AUTO: 25 % (ref 34.8–46.1)
HEMOCCULT STL QL: NEGATIVE
HGB BLD-MCNC: 8 G/DL (ref 11.5–15.4)
HGB BLD-MCNC: 8 G/DL (ref 11.5–15.4)
IMM GRANULOCYTES # BLD AUTO: 0.25 THOUSAND/UL (ref 0–0.2)
IMM GRANULOCYTES NFR BLD AUTO: 3 % (ref 0–2)
LYMPHOCYTES # BLD AUTO: 1.22 THOUSANDS/ΜL (ref 0.6–4.47)
LYMPHOCYTES NFR BLD AUTO: 15 % (ref 14–44)
MCH RBC QN AUTO: 28.2 PG (ref 26.8–34.3)
MCHC RBC AUTO-ENTMCNC: 32.3 G/DL (ref 31.4–37.4)
MCV RBC AUTO: 87 FL (ref 82–98)
MONOCYTES # BLD AUTO: 0.81 THOUSAND/ΜL (ref 0.17–1.22)
MONOCYTES NFR BLD AUTO: 10 % (ref 4–12)
NEUTROPHILS # BLD AUTO: 5.54 THOUSANDS/ΜL (ref 1.85–7.62)
NEUTS SEG NFR BLD AUTO: 70 % (ref 43–75)
NRBC BLD AUTO-RTO: 0 /100 WBCS
PLATELET # BLD AUTO: 122 THOUSANDS/UL (ref 149–390)
PMV BLD AUTO: 10.7 FL (ref 8.9–12.7)
RBC # BLD AUTO: 2.84 MILLION/UL (ref 3.81–5.12)
WBC # BLD AUTO: 7.91 THOUSAND/UL (ref 4.31–10.16)

## 2019-10-05 PROCEDURE — 99232 SBSQ HOSP IP/OBS MODERATE 35: CPT | Performed by: HOSPITALIST

## 2019-10-05 PROCEDURE — 99254 IP/OBS CNSLTJ NEW/EST MOD 60: CPT | Performed by: UROLOGY

## 2019-10-05 PROCEDURE — 85018 HEMOGLOBIN: CPT | Performed by: HOSPITALIST

## 2019-10-05 PROCEDURE — 82272 OCCULT BLD FECES 1-3 TESTS: CPT | Performed by: HOSPITALIST

## 2019-10-05 PROCEDURE — 85014 HEMATOCRIT: CPT | Performed by: HOSPITALIST

## 2019-10-05 PROCEDURE — 85025 COMPLETE CBC W/AUTO DIFF WBC: CPT | Performed by: HOSPITALIST

## 2019-10-05 PROCEDURE — 99253 IP/OBS CNSLTJ NEW/EST LOW 45: CPT | Performed by: OBSTETRICS & GYNECOLOGY

## 2019-10-05 RX ADMIN — ENOXAPARIN SODIUM 90 MG: 100 INJECTION SUBCUTANEOUS at 09:57

## 2019-10-05 RX ADMIN — CEFAZOLIN SODIUM 2000 MG: 2 SOLUTION INTRAVENOUS at 15:53

## 2019-10-05 RX ADMIN — CEFAZOLIN SODIUM 2000 MG: 2 SOLUTION INTRAVENOUS at 22:55

## 2019-10-05 RX ADMIN — PANTOPRAZOLE SODIUM 40 MG: 40 TABLET, DELAYED RELEASE ORAL at 05:24

## 2019-10-05 RX ADMIN — Medication 250 MG: at 09:56

## 2019-10-05 RX ADMIN — ARIPIPRAZOLE 10 MG: 10 TABLET ORAL at 09:57

## 2019-10-05 RX ADMIN — LORAZEPAM 1 MG: 1 TABLET ORAL at 23:03

## 2019-10-05 RX ADMIN — VENLAFAXINE 75 MG: 37.5 TABLET ORAL at 09:57

## 2019-10-05 RX ADMIN — LORAZEPAM 1 MG: 1 TABLET ORAL at 09:56

## 2019-10-05 RX ADMIN — Medication 250 MG: at 17:40

## 2019-10-05 RX ADMIN — VENLAFAXINE 75 MG: 37.5 TABLET ORAL at 22:54

## 2019-10-05 RX ADMIN — CEFAZOLIN SODIUM 2000 MG: 2 SOLUTION INTRAVENOUS at 06:44

## 2019-10-05 RX ADMIN — MELATONIN 1000 UNITS: at 09:57

## 2019-10-05 RX ADMIN — VENLAFAXINE 75 MG: 37.5 TABLET ORAL at 15:54

## 2019-10-05 RX ADMIN — ASPIRIN 81 MG: 81 TABLET, COATED ORAL at 09:57

## 2019-10-05 NOTE — PROGRESS NOTES
Progress Note - Lucy Melendez 1955, 60 y o  female MRN: [id number]    Unit/Bed#: [facility] 910-01 Encounter: [id number]    Primary Care Provider: Ernst Blackburn DO   Date and time admitted to hospital: 9/29/2019 10:04 PM        Moderate protein-calorie malnutrition ([address] )  Assessment & Plan  Malnutrition Findings:   Malnutrition type: Chronic illness(in the context of nutrition impact symptoms from cancer and cancer txs and recent extended in pt admit resulting in wt loss and sub-optimal po intake)  Degree of Malnutrition: Malnutrition of moderate degree    BMI Findings: Body mass index is 39 63 kg/m²  Bilateral lower extremity edema  Assessment & Plan  · Pt reports to chronic b/l LE edema  Previously was on lasix but this was held by PCP given GI losses (was having diarrhea associated with chemo)  · Pt doesn't think she is ready for it yet  · Would recommend to elevate extremities, compression stockings     Swelling of left upper extremity  Assessment & Plan  · Pt has noted LUE pain and swelling since unsuccessful attempt at PICC placement in that arm  · Check LUE duplex - no evidence of acute or chronic DVT of left upper extremity  No evidence of superficial thrombophlebitis  There is evidence of a non-vascularized structure in the area of the bicep muscle  It did note acute thrombus in IJ vein on the right  ·  These results have been discussed with her previously and felt to be related to port infection  No further changes to the plan at this time    · xarelto was already changed to lovenox last admission  · PRN pain control   · Trial of compression sleeve for edema management     MSSA bacteremia  Assessment & Plan  · In the setting of port a cath and was admitted 9/19-9/29 with sepsis   · Catheter removed on 9/20 now has R PICC placed 9/27  · Repeat cultures have been negative for > 5 days   · TRINH without vegetation  · ID recommended 6 weeks of abx - on day #16 of abx, Cefazolin day #14    Anemia  Assessment & Plan  · Hb 6 7 s/p 1 unit of PRBC - 8 2 - 7 5  · When 7 5 & 7 5 repeated 1  uPRBC -> 9 2 -> 8 2 -  8  Stool occult neg  · No active bleed, brief blood in PCN at night after exchange yesterday, notice of hematuria by night nurses per urethra - consulted urology after d/w Gyn-Onc  · Received transfusion last week as well  · Had EGD on 9/24 without evidence of source of blood loss  · If again dropping but no source known will perform CT abdomen    Acute deep vein thrombosis (DVT) of proximal vein of lower extremity ([facility] [address] )  Assessment & Plan  · GYN Onc team switched from Eliquis to Lovenox BID permanently  · Consider repeat US as outpatient in 2 weeks or so    Endometrial cancer [facility])  Assessment & Plan  · Appreciate Gynecologic Oncology evaluation on recent admission   · She received Taxol/carboplatin and received last chemotherapy 09/09/2019  · Received granix 08/31/2019, also Neulasta 09/09/2019 and 09/16/2019  · Chemotherapy to be on hold for now given acute infection  · CT CAP - shows some improvement in areas of malignancy, R>L pleural effusion, nodules in lung which can be infectious/inflammatory       Benign essential hypertension  Assessment & Plan  · BP acceptbale, monitor routinely   · Continue home medical management     * Generalized weakness  Assessment & Plan  · Discharged from [facility] 9/29 with recommended home PT 3-5x/week however represented to [facility] ED after being unable to lift her right leg to go up 2 stairs at home   · Transferred to [facility] for continuity of care   · Likely 2/2 physical decondition in the setting of recurrent endometrial cancer   · Monitor hemoglobin and transfuse prn   · Fall precautions  · PT/OT consults placed - recommending rehab   · Patient had refused rehab on prior admission but is now agreeable, case management following      [facility] Progress Note  Patient: Serenity Boys 60 y o  female   MRN: [id number]  PCP: Lety Abraham, DO  Unit/Bed#: [facility] 910-01 Encounter: [id number]  Date Of Visit: 10/05/19    Assessment:    Principal Problem:    Generalized weakness  Active Problems:    Benign essential hypertension    Endometrial cancer (HCC)    Acute deep vein thrombosis (DVT) of proximal vein of lower extremity (HCC)    Anemia    MSSA bacteremia    Swelling of left upper extremity    Bilateral lower extremity edema    Moderate protein-calorie malnutrition (HCC)      VTE Pharmacologic Prophylaxis:   Pharmacologic: Pharmacologic VTE Prophylaxis contraindicated due to anemia  Mechanical VTE Prophylaxis in Place: Yes    Patient Centered Rounds: I have performed bedside rounds with nursing staff today  Discussions with Specialists or Other Care Team Provider: GYN-Onc    Education and Discussions with Family / Patient: patient    Time Spent for Care: 30 minutes  More than 50% of total time spent on counseling and coordination of care as described above  Current Length of Stay: 3 day(s)    Current Patient Status: Inpatient   Certification Statement: The patient will continue to require additional inpatient hospital stay due to monitor hb    Discharge Plan / Estimated Discharge Date: unclear    Code Status: Level 1 - Full Code      Subjective:   Feels ok, no bleeding noticed by patient    Objective:     Vitals:   Temp (24hrs), Av 1 °F (36 7 °C), Min:97 7 °F (36 5 °C), Max:98 5 °F (36 9 °C)    Temp:  [97 7 °F (36 5 °C)-98 5 °F (36 9 °C)] 97 7 °F (36 5 °C)  HR:  [78-85] 78  Resp:  [18-19] 19  BP: (150)/(84-85) 150/84  SpO2:  [98 %-99 %] 98 %  Body mass index is 39 63 kg/m²  Input and Output Summary (last 24 hours): Intake/Output Summary (Last 24 hours) at 10/5/2019 1517  Last data filed at 10/5/2019 1001  Gross per 24 hour   Intake 890 ml   Output 1525 ml   Net -635 ml       Physical Exam:     Physical Exam   Constitutional: She is oriented to person, place, and time  She appears well-developed and well-nourished     HENT:   Head: Normocephalic and atraumatic  Eyes: Conjunctivae are normal    Cardiovascular: Normal rate and regular rhythm  Exam reveals no friction rub  No murmur heard  Pulmonary/Chest: Effort normal and breath sounds normal  No stridor  No respiratory distress  Abdominal: Soft  She exhibits no distension  There is no tenderness  Neurological: She is alert and oriented to person, place, and time  Vitals reviewed  Additional Data:     Labs:    Results from last 7 days   Lab Units 10/05/19  0521   WBC Thousand/uL 7 91   HEMOGLOBIN g/dL 8 0*   HEMATOCRIT % 24 8*   PLATELETS Thousands/uL 122*   NEUTROS PCT % 70   LYMPHS PCT % 15   MONOS PCT % 10   EOS PCT % 1     Results from last 7 days   Lab Units 10/02/19  0545   POTASSIUM mmol/L 4 4   CHLORIDE mmol/L 105   CO2 mmol/L 18*   BUN mg/dL 22   CREATININE mg/dL 0 96   CALCIUM mg/dL 8 5           * I Have Reviewed All Lab Data Listed Above  * Additional Pertinent Lab Tests Reviewed: All Labs Within Last 24 Hours Reviewed    Imaging:    Imaging Reports Reviewed Today Include:   Imaging Personally Reviewed by Myself Includes:      Recent Cultures (last 7 days):           Last 24 Hours Medication List:     Current Facility-Administered Medications:  acetaminophen 650 mg Oral Q6H PRN Salvatore Render, PA-C    ARIPiprazole 10 mg Oral Daily Emperatriz E Held, PA-C    benzonatate 200 mg Oral TID PRN Emperatriz E Held, PA-C    ceFAZolin 2,000 mg Intravenous Q8H Emperatriz E Held, PA-C Last Rate: 2,000 mg (10/05/19 8555)   cholecalciferol 1,000 Units Oral Daily Emperatriz E Held, PA-C    LORazepam 1 mg Oral Q6H PRN Emperatriz E Held, PA-C    pantoprazole 40 mg Oral Early Morning Emperatriz E Held, PA-C    saccharomyces boulardii 250 mg Oral BID Sylwia Moren, PA-C    venlafaxine 75 mg Oral TID Emperatriz E Held, PA-C         Today, Patient Was Seen By: Kami Lara MD    ** Please Note: This note has been constructed using a voice recognition system   **

## 2019-10-05 NOTE — ASSESSMENT & PLAN NOTE
· Hb 6 7 s/p 1 unit of PRBC - 8 2 - 7 5  · When 7 5 & 7 5 repeated 1  uPRBC -> 9 2 -> 8 2 -  8  Stool occult neg  · No active bleed, brief blood in PCN at night after exchange yesterday, notice of hematuria by night nurses per urethra - consulted urology after d/w Gyn-Onc  · Received transfusion last week as well  · Had EGD on 9/24 without evidence of source of blood loss  · If again dropping but no source known will perform CT abdomen  · lovenox & aspirin held

## 2019-10-05 NOTE — ASSESSMENT & PLAN NOTE
· In the setting of port a cath and was admitted 9/19-9/29 with sepsis   · Catheter removed on 9/20 now has R PICC placed 9/27  · Repeat cultures have been negative for > 5 days   · TRINH without vegetation  · ID recommended 6 weeks of abx - on day #16 of abx, Cefazolin day #14

## 2019-10-05 NOTE — ASSESSMENT & PLAN NOTE
· Discharged from [facility] 9/29 with recommended home PT 3-5x/week however represented to [facility] ED after being unable to lift her right leg to go up 2 stairs at home   · Transferred to [facility] for continuity of care   · Likely 2/2 physical decondition in the setting of recurrent endometrial cancer   · Monitor hemoglobin and transfuse prn   · Fall precautions  · PT/OT consults placed - recommending rehab   · Patient had refused rehab on prior admission but is now agreeable, case management following

## 2019-10-05 NOTE — CONSULTS
Consultation - Gynecologic/Oncology   Payton Melendez 60 y o  female MRN: [id number]  [facility] Encounter: [id number]    Assessment/Plan     Assessment:    60year old with recurrent stage 1B1 endometrial cancer readmitted with weakness and found to have persistent anemia  Plan:  1  Endometrial cancer  -carbo/Taxol with last treatment 09/16/2019  -Status post granix 08/31/2019 for chemo induced leukopenia  -Neulasta initiated 09/09/2019  -All treatment currently on hold  2  Persistent anemia despite multiple transfusions  -no obvious source of active bleeding, however, patient is noted to have hematuria  -would recommend transfusion to a Hb of >8  -would recommend consulting urology for evaluation of hematuria  -if urologic workup is negative, would recommend GI consultation for possible endoscopy as FOBT was negative    History of Present Illness   Physician Requesting Consult: Han Cannon MD  Reason for Consult / Principal Problem: endometrial cancer    HPI: Sammi Clay is a 60y o  year old female who is readmitted with weakness following recent hospital admission for sepsis  She has had recurrent anemia with need for multiple blood transfusions with no obvious source of active bleeding  She reports feeling tired and weak, but denies any other symptoms at this time  Consults    Review of Systems   Constitutional: Positive for fatigue  Negative for chills and fever  Eyes: Negative for visual disturbance  Respiratory: Negative for chest tightness and shortness of breath  Cardiovascular: Negative for chest pain  Gastrointestinal: Negative for abdominal pain, nausea and vomiting  Genitourinary: Positive for hematuria  Negative for vaginal bleeding  Neurological: Positive for weakness  Negative for dizziness and headaches         Historical Information   Previous Oncology History:     Endometrial cancer ([facility] [address] )     11/17/2017 Initial Diagnosis       Endometrial cancer [facility])        12/19/2017 Surgery       Robotic assisted total laparoscopic hysterectomy with bilateral salpingo-oophorectomy and sentinel bilateral pelvic lymph node dissection  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 (4 4 x 3 2 cm tumor, 9 4/15 4mm invasion, NO LVSI, washings revealed atypical cellular changes)        12/19/2017 Genetic Testing       Morrison testing negative        7/8/2019 Recurrence       Presented with right lower extremity DVT and CT demonstrating right pelvic sidewall mass with venous, ureteral and nerve compression causing significant neuropathic pain    Core biopsy demonstrates high-grade carcinoma         7/30/2019 -  Chemotherapy       pegfilgrastim (NEULASTA ONPRO) subcutaneous injection kit 6 mg, 6 mg, Subcutaneous, Once, 0 of 4 cycles  CARBOplatin (PARAPLATIN) 574 2 mg in sodium chloride 0 9 % 250 mL IVPB, 574 2 mg, Intravenous, Once, 2 of 6 cycles  Administration: 574 2 mg (7/30/2019), 553 2 mg (8/19/2019)  PACLitaxel (TAXOL) 337 8 mg in sodium chloride 0 9 % 500 mL chemo IVPB, 175 mg/m2 = 337 8 mg, Intravenous, Once, 2 of 6 cycles  Administration: 337 8 mg (7/30/2019), 327 mg (8/19/2019)         Past Medical History:   Diagnosis Date    Anemia     Chemotherapy follow-up examination     Depression     Diabetes mellitus [facility] [address]     Endometrial cancer [address] [address] ) 12/2017    Hyperglycemia     vx type 2 dm -- last assessed 4/1/14; resolved 11/7/17    Hyperlipidemia     Hypertension     Obesity     last assessed 10/14/17; resolved 11/7/17     Past Surgical History:   Procedure Laterality Date    ABDOMINAL SURGERY      GASTRIC BYPASS    CHOLECYSTECTOMY      at the time of gastric bypass    COLONOSCOPY      CT NEEDLE BIOPSY LYMPH NODE  7/8/2019    GASTRIC BYPASS      HYSTERECTOMY Bilateral     total abdominal with salpingo-oophorectomy    IR PICC LINE  9/27/2019    IR PORT PLACEMENT  7/26/2019    IR PORT REMOVAL  9/20/2019    IR TUBE PLACEMENT NEPHROSTOMY  7/26/2019  OOPHORECTOMY Bilateral     TN LAP, RADICAL HYST W/ TUBE&OV, NODE BX N/A 12/19/2017    Procedure: HYSTERECTOMY LAPAROSCOPIC TOTAL ([address]) W/ ROBOTICS; BILATERAL SALPINGOOPHERECTOMY; LYMPH NODE DISSECTION; lysis of adhesions;  Surgeon: Sharri Birmingham MD;  [facility];  Service: Gynecology Oncology    TN LAP,DIAGNOSTIC ABDOMEN N/A 12/19/2017    Procedure: LAPAROSCOPY DIAGNOSTIC;  Surgeon: Sharri Birmingham MD;  [facility];  Service: Gynecology Oncology    TONSILLECTOMY      US GUIDED BREAST BIOPSY RIGHT COMPLETE Right 6/28/2019       Family History   Problem Relation Age of Onset    Other Mother         dyslipidemia    Ovarian cancer Mother 48    Lymphoma Father     Bone cancer Maternal Grandfather     No Known Problems Sister     No Known Problems Maternal Grandmother     No Known Problems Paternal Grandmother     No Known Problems Paternal Grandfather     No Known Problems Maternal Aunt     No Known Problems Maternal Aunt     No Known Problems Maternal Aunt     No Known Problems Maternal Aunt     No Known Problems Paternal Aunt     No Known Problems Paternal Aunt     No Known Problems Paternal Aunt     No Known Problems Paternal Aunt     No Known Problems Brother     No Known Problems Brother      Social History   Social History     Substance and Sexual Activity   Alcohol Use Not Currently     Social History     Substance and Sexual Activity   Drug Use No     Social History     Tobacco Use   Smoking Status Never Smoker   Smokeless Tobacco Never Used       Meds/Allergies   all current active meds have been reviewed    Allergies   Allergen Reactions    Cephalosporins Rash     Which Cephalosporin reaction was to not specified; however, has tolerated Amoxicillin, Cefazolin, and Cefepime       Objective    /84   Pulse 78   Temp 97 7 °F (36 5 °C)   Resp 19   Ht 4' 11" (1 499 m)   Wt 89 kg (196 lb 3 4 oz) Comment: 9/29  LMP  (LMP Unknown)   SpO2 98%   BMI 39 63 kg/m²       Intake/Output Summary (Last 24 hours) at 10/5/2019 1033  Last data filed at 10/5/2019 1001  Gross per 24 hour   Intake 890 ml   Output 1525 ml   Net -635 ml     Invasive Devices     Peripherally Inserted Central Catheter Line            PICC Line 09/27/19 Right Brachial 7 days          Drain            Percutaneous Nephroureteral Tube (PCNU) Right 1 8 Fr 26 Cm 1 day              Physical Exam   Constitutional: She is oriented to person, place, and time  She appears well-developed and well-nourished  No distress  HENT:   Head: Normocephalic and atraumatic  Neck: Normal range of motion  Cardiovascular: Normal rate and regular rhythm  Pulmonary/Chest: Effort normal and breath sounds normal    Abdominal: Soft  There is no tenderness  There is no guarding  Musculoskeletal: Normal range of motion  Neurological: She is alert and oriented to person, place, and time  Skin: Skin is warm  She is not diaphoretic  Psychiatric: She has a normal mood and affect  Her behavior is normal    Vitals reviewed  Lab Results: I have personally reviewed pertinent reports  Imaging Studies: I have personally reviewed pertinent reports  EKG, Pathology, and Other Studies: I have personally reviewed pertinent reports        Code Status: Level 1 - Full Code

## 2019-10-05 NOTE — PLAN OF CARE
Problem: Prexisting or High Potential for Compromised Skin Integrity  Goal: Skin integrity is maintained or improved  Description  INTERVENTIONS:  - Identify patients at risk for skin breakdown  - Assess and monitor skin integrity  - Assess and monitor nutrition and hydration status  - Monitor labs   - Assess for incontinence   - Turn and reposition patient  - Assist with mobility/ambulation  - Relieve pressure over bony prominences  - Avoid friction and shearing  - Provide appropriate hygiene as needed including keeping skin clean and dry  - Evaluate need for skin moisturizer/barrier cream  - Collaborate with interdisciplinary team   - Patient/family teaching  - Consider wound care consult   Outcome: Progressing     Problem: Potential for Falls  Goal: Patient will remain free of falls  Description  INTERVENTIONS:  - Assess patient frequently for physical needs  -  Identify cognitive and physical deficits and behaviors that affect risk of falls  -  [place] fall precautions as indicated by assessment   - Educate patient/family on patient safety including physical limitations  - Instruct patient to call for assistance with activity based on assessment  - Modify environment to reduce risk of injury  - Consider OT/PT consult to assist with strengthening/mobility  Outcome: Progressing     Problem: Nutrition/Hydration-ADULT  Goal: Nutrient/Hydration intake appropriate for improving, restoring or maintaining nutritional needs  Description  Monitor and assess patient's nutrition/hydration status for malnutrition  Collaborate with interdisciplinary team and initiate plan and interventions as ordered  Monitor patient's weight and dietary intake as ordered or per policy  Utilize nutrition screening tool and intervene as necessary  Determine patient's food preferences and provide high-protein, high-caloric foods as appropriate       INTERVENTIONS:  - Monitor oral intake, urinary output, labs, and treatment plans  - Assess nutrition and hydration status and recommend course of action  - Evaluate amount of meals eaten  - Assist patient with eating if necessary   - Allow adequate time for meals  - Recommend/ encourage appropriate diets, oral nutritional supplements, and vitamin/mineral supplements  - Order, calculate, and assess calorie counts as needed  - Recommend, monitor, and adjust tube feedings and TPN/PPN based on assessed needs  - Assess need for intravenous fluids  - Provide specific nutrition/hydration education as appropriate  - Include patient/family/caregiver in decisions related to nutrition  Outcome: Progressing     Problem: PAIN - ADULT  Goal: Verbalizes/displays adequate comfort level or baseline comfort level  Description  Interventions:  - Encourage patient to monitor pain and request assistance  - Assess pain using appropriate pain scale  - Administer analgesics based on type and severity of pain and evaluate response  - Implement non-pharmacological measures as appropriate and evaluate response  - Consider cultural and social influences on pain and pain management  - Notify physician/advanced practitioner if interventions unsuccessful or patient reports new pain  Outcome: Progressing     Problem: INFECTION - ADULT  Goal: Absence or prevention of progression during hospitalization  Description  INTERVENTIONS:  - Assess and monitor for signs and symptoms of infection  - Monitor lab/diagnostic results  - Monitor all insertion sites, i e  indwelling lines, tubes, and drains  - Monitor endotracheal if appropriate and nasal secretions for changes in amount and color  - [place] appropriate cooling/warming therapies per order  - Administer medications as ordered  - Instruct and encourage patient and family to use good hand hygiene technique  - Identify and instruct in appropriate isolation precautions for identified infection/condition  Outcome: Progressing     Problem: SAFETY ADULT  Goal: Maintain or return to baseline ADL function  Description  INTERVENTIONS:  -  Assess patient's ability to carry out ADLs; assess patient's baseline for ADL function and identify physical deficits which impact ability to perform ADLs (bathing, care of mouth/teeth, toileting, grooming, dressing, etc )  - Assess/evaluate cause of self-care deficits   - Assess range of motion  - Assess patient's mobility; develop plan if impaired  - Assess patient's need for assistive devices and provide as appropriate  - Encourage maximum independence but intervene and supervise when necessary  - Involve family in performance of ADLs  - Assess for home care needs following discharge   - Consider OT consult to assist with ADL evaluation and planning for discharge  - Provide patient education as appropriate  Outcome: Progressing  Goal: Maintain or return mobility status to optimal level  Description  INTERVENTIONS:  - Assess patient's baseline mobility status (ambulation, transfers, stairs, etc )    - Identify cognitive and physical deficits and behaviors that affect mobility  - Identify mobility aids required to assist with transfers and/or ambulation (gait belt, sit-to-stand, lift, walker, cane, etc )  - [place] fall precautions as indicated by assessment  - Record patient progress and toleration of activity level on Mobility SBAR; progress patient to next Phase/Stage  - Instruct patient to call for assistance with activity based on assessment  - Consider rehabilitation consult to assist with strengthening/weightbearing, etc   Outcome: Progressing     Problem: DISCHARGE PLANNING  Goal: Discharge to home or other facility with appropriate resources  Description  INTERVENTIONS:  - Identify barriers to discharge w/patient and caregiver  - Arrange for needed discharge resources and transportation as appropriate  - Identify discharge learning needs (meds, wound care, etc )  - Arrange for interpretive services to assist at discharge as needed  - Refer to Case Management Department for coordinating discharge planning if the patient needs post-hospital services based on physician/advanced practitioner order or complex needs related to functional status, cognitive ability, or social support system  Outcome: Progressing

## 2019-10-05 NOTE — SOCIAL WORK
CM received telephone call from Dr Howard Burton that pt's Hgb is dropping and d/c will need to be cancelled today  CM called [facility] & cancelled ride for today  CM sent Ecin message to [employer] to notify them 0f cancelled d/c for today

## 2019-10-05 NOTE — CONSULTS
UROLOGY CONSULTATION NOTE     Patient Identifiers: Bernardino Hernandez (MRN [id number])  Service Requesting Consultation: Linda Dee  Service Providing Consultation:  Urology, Rosan Sandifer, MD    Date of Service: 10/5/2019  Consults  Reason for Consultation: Gross hematuria, s/p PCN and stent exchange    History of Present Illness:     Bernardino Hernandez is a 60 y o  old with a history of hydronephrosis secondary to recurrence of endometrial carcinoma with large pelvic mass on the right causing obstruction of the right ureter  Patient had insertion of percutaneous nephrostomy tube performed on 07/18/2019  She underwent routine nephrostomy exchange along with insertion of a nephroureteral stent with interventional Radiology yesterday  Patient has some degree of hematuria since then      Past Medical, Past Surgical History:     Past Medical History:   Diagnosis Date    Anemia     Chemotherapy follow-up examination     Depression     Diabetes mellitus [facility] [address] )     Endometrial cancer ([facility] [address] ) 12/2017    Hyperglycemia     vx type 2 dm -- last assessed 4/1/14; resolved 11/7/17    Hyperlipidemia     Hypertension     Obesity     last assessed 10/14/17; resolved 11/7/17   :    Past Surgical History:   Procedure Laterality Date    ABDOMINAL SURGERY      GASTRIC BYPASS    CHOLECYSTECTOMY      at the time of gastric bypass    COLONOSCOPY      CT NEEDLE BIOPSY LYMPH NODE  7/8/2019    GASTRIC BYPASS      HYSTERECTOMY Bilateral     total abdominal with salpingo-oophorectomy    IR PICC LINE  9/27/2019    IR PORT PLACEMENT  7/26/2019    IR PORT REMOVAL  9/20/2019    IR TUBE PLACEMENT NEPHROSTOMY  7/26/2019    OOPHORECTOMY Bilateral     MT LAP, RADICAL HYST W/ TUBE&OV, NODE BX N/A 12/19/2017    Procedure: HYSTERECTOMY [address]COPIC TOTAL ([address]) W/ ROBOTICS; BILATERAL SALPINGOOPHERECTOMY; LYMPH NODE DISSECTION; lysis of adhesions;  Surgeon: Davie Wright MD;  [facility];  Service: Gynecology Oncology    MT LAP,DIAGNOSTIC ABDOMEN N/A 12/19/2017    Procedure: LAPAROSCOPY DIAGNOSTIC;  Surgeon: Vandana Joaquin MD;  [facility];  Service: Gynecology Oncology    TONSILLECTOMY      US GUIDED BREAST BIOPSY RIGHT COMPLETE Right 6/28/2019   :    Medications, Allergies:     Current Facility-Administered Medications   Medication Dose Route Frequency    acetaminophen (TYLENOL) tablet 650 mg  650 mg Oral Q6H PRN    ARIPiprazole (ABILIFY) tablet 10 mg  10 mg Oral Daily    benzonatate (TESSALON PERLES) capsule 200 mg  200 mg Oral TID PRN    ceFAZolin (ANCEF) IVPB (premix) 2,000 mg  2,000 mg Intravenous Q8H    cholecalciferol (VITAMIN D3) tablet 1,000 Units  1,000 Units Oral Daily    LORazepam (ATIVAN) tablet 1 mg  1 mg Oral Q6H PRN    pantoprazole (PROTONIX) EC tablet 40 mg  40 mg Oral Early Morning    saccharomyces boulardii (FLORASTOR) capsule 250 mg  250 mg Oral BID    venlafaxine (EFFEXOR) tablet 75 mg  75 mg Oral TID       Allergies:   Allergies   Allergen Reactions    Cephalosporins Rash     Which Cephalosporin reaction was to not specified; however, has tolerated Amoxicillin, Cefazolin, and Cefepime   :    Social and Family History:   Social History:   Social History     Tobacco Use    Smoking status: Never Smoker    Smokeless tobacco: Never Used   Substance Use Topics    Alcohol use: Not Currently    Drug use: No        Social History     Tobacco Use   Smoking Status Never Smoker   Smokeless Tobacco Never Used       Family History:  Family History   Problem Relation Age of Onset    Other Mother         dyslipidemia    Ovarian cancer Mother 48    Lymphoma Father     Bone cancer Maternal Grandfather     No Known Problems Sister     No Known Problems Maternal Grandmother     No Known Problems Paternal Grandmother     No Known Problems Paternal Grandfather     No Known Problems Maternal Aunt     No Known Problems Maternal Aunt     No Known Problems Maternal Aunt     No Known Problems Maternal Aunt     No Known Problems Paternal Aunt     No Known Problems Paternal Aunt     No Known Problems Paternal Aunt     No Known Problems Paternal Aunt     No Known Problems Brother     No Known Problems Brother    :     Review of Systems:     General: Fever, chills, or night sweats: negative  Cardiac: Negative for chest pain  Pulmonary: Negative for shortness of breath  Gastrointestinal: Abdominal pain negative  Nausea, vomiting, or diarrhea negative,  Genitourinary: See HPI above  Patient does have hematuria  All other systems queried were negative  Physical Exam:   I/O last 24 hours: In: 1010 [P O :960; IV Piggyback:50]  Out: 1525 [Urine:1525]  Temp (24hrs), Av 3 °F (36 8 °C), Min:97 7 °F (36 5 °C), Max:98 6 °F (37 °C)  current: Temperature: 97 7 °F (36 5 °C)  General: Patient is pleasant and in NAD   Awake and alert  /84   Pulse 78   Temp 97 7 °F (36 5 °C)   Resp 19   Ht 4' 11" (1 499 m)   Wt 89 kg (196 lb 3 4 oz) Comment:   LMP  (LMP Unknown)   SpO2 98%   BMI 39 63 kg/m²   /84   Pulse 78   Temp 97 7 °F (36 5 °C)   Resp 19   Ht 4' 11" (1 499 m)   Wt 89 kg (196 lb 3 4 oz) Comment:   LMP  (LMP Unknown)   SpO2 98%   BMI 39 63 kg/m²   General appearance: alert and oriented, in no acute distress  Head: Normocephalic, without obvious abnormality, atraumatic  Lungs: clear to auscultation bilaterally  Heart: regular rate and rhythm, S1, S2 normal, no murmur, click, rub or gallop  Abdomen: soft, non-tender; bowel sounds normal; no masses,  no organomegaly  Extremities: extremities normal, warm and well-perfused; no cyanosis, clubbing, or edema  GARCIA: none        Labs:     Lab Results   Component Value Date    HGB 8 0 (L) 10/05/2019    HCT 24 8 (L) 10/05/2019    WBC 7 91 10/05/2019     (L) 10/05/2019   ]    Lab Results   Component Value Date     10/27/2017    K 4 4 10/02/2019     10/02/2019    CO2 18 (L) 10/02/2019    BUN 22 10/02/2019    CREATININE 0 96 10/02/2019    CALCIUM 8 5 10/02/2019    GLUCOSE 219 (H) 12/19/2017   ]    Imaging:   I personally reviewed the images and report of the following studies, and reviewed them with the patient:  IR PCNU tube change   Final Result   Impression:   1  Nephrostogram demonstrates good positioning of the indwelling right nephroureteral catheter  2  Successful exchange of 10 [place] 26 cm nephroureteral stent under fluoroscopic control  Workstation performed: [id number]         VAS upper limb venous duplex scan, unilateral/limited   Final Result      XR chest pa & lateral   Final Result      Borderline cardiomegaly  Decreased slight vascular congestion  Right PICC line in place  Workstation performed: [id number]             ASSESSMENT:     60 y o  old female with hematuria and anemia in the setting of a recent right nephrostomy tube and ureteral stent exchange  PLAN:     -Lovenox and aspirin discontinued today  Advised continuing to monitor H&H trend  If hematuria and anemia worsens, consideration should be given to IR renal arteriogram and possible embolization for underlying bleeder, however at this juncture it may simply be procedure related and may be self-limited  Will continue to follow closely  Thank you for allowing me to participate in this patients care  Please do not hesitate to call with any additional questions    Roxann Irene MD

## 2019-10-05 NOTE — PLAN OF CARE
Problem: Prexisting or High Potential for Compromised Skin Integrity  Goal: Skin integrity is maintained or improved  Description  INTERVENTIONS:  - Identify patients at risk for skin breakdown  - Assess and monitor skin integrity  - Assess and monitor nutrition and hydration status  - Monitor labs   - Assess for incontinence   - Turn and reposition patient  - Assist with mobility/ambulation  - Relieve pressure over bony prominences  - Avoid friction and shearing  - Provide appropriate hygiene as needed including keeping skin clean and dry  - Evaluate need for skin moisturizer/barrier cream  - Collaborate with interdisciplinary team   - Patient/family teaching  - Consider wound care consult   Outcome: Progressing     Problem: Potential for Falls  Goal: Patient will remain free of falls  Description  INTERVENTIONS:  - Assess patient frequently for physical needs  -  Identify cognitive and physical deficits and behaviors that affect risk of falls  -  Girard fall precautions as indicated by assessment   - Educate patient/family on patient safety including physical limitations  - Instruct patient to call for assistance with activity based on assessment  - Modify environment to reduce risk of injury  - Consider OT/PT consult to assist with strengthening/mobility  Outcome: Progressing     Problem: Nutrition/Hydration-ADULT  Goal: Nutrient/Hydration intake appropriate for improving, restoring or maintaining nutritional needs  Description  Monitor and assess patient's nutrition/hydration status for malnutrition  Collaborate with interdisciplinary team and initiate plan and interventions as ordered  Monitor patient's weight and dietary intake as ordered or per policy  Utilize nutrition screening tool and intervene as necessary  Determine patient's food preferences and provide high-protein, high-caloric foods as appropriate       INTERVENTIONS:  - Monitor oral intake, urinary output, labs, and treatment plans  - Assess nutrition and hydration status and recommend course of action  - Evaluate amount of meals eaten  - Assist patient with eating if necessary   - Allow adequate time for meals  - Recommend/ encourage appropriate diets, oral nutritional supplements, and vitamin/mineral supplements  - Order, calculate, and assess calorie counts as needed  - Recommend, monitor, and adjust tube feedings and TPN/PPN based on assessed needs  - Assess need for intravenous fluids  - Provide specific nutrition/hydration education as appropriate  - Include patient/family/caregiver in decisions related to nutrition  Outcome: Progressing     Problem: PAIN - ADULT  Goal: Verbalizes/displays adequate comfort level or baseline comfort level  Description  Interventions:  - Encourage patient to monitor pain and request assistance  - Assess pain using appropriate pain scale  - Administer analgesics based on type and severity of pain and evaluate response  - Implement non-pharmacological measures as appropriate and evaluate response  - Consider cultural and social influences on pain and pain management  - Notify physician/advanced practitioner if interventions unsuccessful or patient reports new pain  Outcome: Progressing     Problem: INFECTION - ADULT  Goal: Absence or prevention of progression during hospitalization  Description  INTERVENTIONS:  - Assess and monitor for signs and symptoms of infection  - Monitor lab/diagnostic results  - Monitor all insertion sites, i e  indwelling lines, tubes, and drains  - Monitor endotracheal if appropriate and nasal secretions for changes in amount and color  - [place] appropriate cooling/warming therapies per order  - Administer medications as ordered  - Instruct and encourage patient and family to use good hand hygiene technique  - Identify and instruct in appropriate isolation precautions for identified infection/condition  Outcome: Progressing     Problem: SAFETY ADULT  Goal: Maintain or return to baseline ADL function  Description  INTERVENTIONS:  -  Assess patient's ability to carry out ADLs; assess patient's baseline for ADL function and identify physical deficits which impact ability to perform ADLs (bathing, care of mouth/teeth, toileting, grooming, dressing, etc )  - Assess/evaluate cause of self-care deficits   - Assess range of motion  - Assess patient's mobility; develop plan if impaired  - Assess patient's need for assistive devices and provide as appropriate  - Encourage maximum independence but intervene and supervise when necessary  - Involve family in performance of ADLs  - Assess for home care needs following discharge   - Consider OT consult to assist with ADL evaluation and planning for discharge  - Provide patient education as appropriate  Outcome: Progressing  Goal: Maintain or return mobility status to optimal level  Description  INTERVENTIONS:  - Assess patient's baseline mobility status (ambulation, transfers, stairs, etc )    - Identify cognitive and physical deficits and behaviors that affect mobility  - Identify mobility aids required to assist with transfers and/or ambulation (gait belt, sit-to-stand, lift, walker, cane, etc )  - Poston fall precautions as indicated by assessment  - Record patient progress and toleration of activity level on Mobility SBAR; progress patient to next Phase/Stage  - Instruct patient to call for assistance with activity based on assessment  - Consider rehabilitation consult to assist with strengthening/weightbearing, etc   Outcome: Progressing     Problem: DISCHARGE PLANNING  Goal: Discharge to home or other facility with appropriate resources  Description  INTERVENTIONS:  - Identify barriers to discharge w/patient and caregiver  - Arrange for needed discharge resources and transportation as appropriate  - Identify discharge learning needs (meds, wound care, etc )  - Arrange for interpretive services to assist at discharge as needed  - Refer to Case Management Department for coordinating discharge planning if the patient needs post-hospital services based on physician/advanced practitioner order or complex needs related to functional status, cognitive ability, or social support system  Outcome: Progressing

## 2019-10-06 ENCOUNTER — APPOINTMENT (INPATIENT)
Dept: RADIOLOGY | Facility: HOSPITAL | Age: 64
DRG: 982 | End: 2019-10-06
Payer: COMMERCIAL

## 2019-10-06 LAB
ANION GAP SERPL CALCULATED.3IONS-SCNC: 8 MMOL/L (ref 4–13)
BASOPHILS # BLD AUTO: 0.03 THOUSANDS/ΜL (ref 0–0.1)
BASOPHILS NFR BLD AUTO: 0 % (ref 0–1)
BUN SERPL-MCNC: 24 MG/DL (ref 5–25)
CALCIUM SERPL-MCNC: 8.4 MG/DL (ref 8.3–10.1)
CHLORIDE SERPL-SCNC: 108 MMOL/L (ref 100–108)
CO2 SERPL-SCNC: 21 MMOL/L (ref 21–32)
CREAT SERPL-MCNC: 0.81 MG/DL (ref 0.6–1.3)
EOSINOPHIL # BLD AUTO: 0.06 THOUSAND/ΜL (ref 0–0.61)
EOSINOPHIL NFR BLD AUTO: 1 % (ref 0–6)
ERYTHROCYTE [DISTWIDTH] IN BLOOD BY AUTOMATED COUNT: 17.2 % (ref 11.6–15.1)
GFR SERPL CREATININE-BSD FRML MDRD: 78 ML/MIN/1.73SQ M
GLUCOSE SERPL-MCNC: 79 MG/DL (ref 65–140)
HCT VFR BLD AUTO: 24.4 % (ref 34.8–46.1)
HGB BLD-MCNC: 7.7 G/DL (ref 11.5–15.4)
IMM GRANULOCYTES # BLD AUTO: 0.17 THOUSAND/UL (ref 0–0.2)
IMM GRANULOCYTES NFR BLD AUTO: 3 % (ref 0–2)
LYMPHOCYTES # BLD AUTO: 1.22 THOUSANDS/ΜL (ref 0.6–4.47)
LYMPHOCYTES NFR BLD AUTO: 18 % (ref 14–44)
MCH RBC QN AUTO: 28 PG (ref 26.8–34.3)
MCHC RBC AUTO-ENTMCNC: 31.6 G/DL (ref 31.4–37.4)
MCV RBC AUTO: 89 FL (ref 82–98)
MONOCYTES # BLD AUTO: 0.68 THOUSAND/ΜL (ref 0.17–1.22)
MONOCYTES NFR BLD AUTO: 10 % (ref 4–12)
NEUTROPHILS # BLD AUTO: 4.61 THOUSANDS/ΜL (ref 1.85–7.62)
NEUTS SEG NFR BLD AUTO: 68 % (ref 43–75)
NRBC BLD AUTO-RTO: 0 /100 WBCS
PLATELET # BLD AUTO: 116 THOUSANDS/UL (ref 149–390)
PMV BLD AUTO: 9.3 FL (ref 8.9–12.7)
POTASSIUM SERPL-SCNC: 4.2 MMOL/L (ref 3.5–5.3)
RBC # BLD AUTO: 2.75 MILLION/UL (ref 3.81–5.12)
SODIUM SERPL-SCNC: 137 MMOL/L (ref 136–145)
WBC # BLD AUTO: 6.77 THOUSAND/UL (ref 4.31–10.16)

## 2019-10-06 PROCEDURE — 99232 SBSQ HOSP IP/OBS MODERATE 35: CPT | Performed by: OBSTETRICS & GYNECOLOGY

## 2019-10-06 PROCEDURE — 84443 ASSAY THYROID STIM HORMONE: CPT | Performed by: PHYSICIAN ASSISTANT

## 2019-10-06 PROCEDURE — 80048 BASIC METABOLIC PNL TOTAL CA: CPT | Performed by: HOSPITALIST

## 2019-10-06 PROCEDURE — 3E03317 INTRODUCTION OF OTHER THROMBOLYTIC INTO PERIPHERAL VEIN, PERCUTANEOUS APPROACH: ICD-10-PCS | Performed by: INTERNAL MEDICINE

## 2019-10-06 PROCEDURE — 85025 COMPLETE CBC W/AUTO DIFF WBC: CPT | Performed by: HOSPITALIST

## 2019-10-06 PROCEDURE — 82746 ASSAY OF FOLIC ACID SERUM: CPT | Performed by: PHYSICIAN ASSISTANT

## 2019-10-06 PROCEDURE — 99232 SBSQ HOSP IP/OBS MODERATE 35: CPT | Performed by: HOSPITALIST

## 2019-10-06 PROCEDURE — 74177 CT ABD & PELVIS W/CONTRAST: CPT

## 2019-10-06 RX ADMIN — PANTOPRAZOLE SODIUM 40 MG: 40 TABLET, DELAYED RELEASE ORAL at 06:38

## 2019-10-06 RX ADMIN — Medication 250 MG: at 17:12

## 2019-10-06 RX ADMIN — VENLAFAXINE 75 MG: 37.5 TABLET ORAL at 08:15

## 2019-10-06 RX ADMIN — VENLAFAXINE 75 MG: 37.5 TABLET ORAL at 17:12

## 2019-10-06 RX ADMIN — ARIPIPRAZOLE 10 MG: 10 TABLET ORAL at 08:15

## 2019-10-06 RX ADMIN — VENLAFAXINE 75 MG: 37.5 TABLET ORAL at 20:28

## 2019-10-06 RX ADMIN — IOHEXOL 100 ML: 350 INJECTION, SOLUTION INTRAVENOUS at 12:58

## 2019-10-06 RX ADMIN — CEFAZOLIN SODIUM 2000 MG: 2 SOLUTION INTRAVENOUS at 23:10

## 2019-10-06 RX ADMIN — Medication 250 MG: at 08:15

## 2019-10-06 RX ADMIN — MELATONIN 1000 UNITS: at 08:15

## 2019-10-06 RX ADMIN — CEFAZOLIN SODIUM 2000 MG: 2 SOLUTION INTRAVENOUS at 06:38

## 2019-10-06 RX ADMIN — CEFAZOLIN SODIUM 2000 MG: 2 SOLUTION INTRAVENOUS at 15:15

## 2019-10-06 RX ADMIN — ALTEPLASE 2 MG: 2.2 INJECTION, POWDER, LYOPHILIZED, FOR SOLUTION INTRAVENOUS at 07:57

## 2019-10-06 RX ADMIN — LORAZEPAM 1 MG: 1 TABLET ORAL at 19:40

## 2019-10-06 NOTE — PROGRESS NOTES
Progress Note - Corinne Melendez 1955, 60 y o  female MRN: [id number]    Unit/Bed#: [facility] 910-01 Encounter: [id number]    Primary Care Provider: Skylar Mcdaniel DO   Date and time admitted to hospital: 9/29/2019 10:04 PM        Moderate protein-calorie malnutrition ([address] )  Assessment & Plan  Malnutrition Findings:   Malnutrition type: Chronic illness(in the context of nutrition impact symptoms from cancer and cancer txs and recent extended in pt admit resulting in wt loss and sub-optimal po intake)  Degree of Malnutrition: Malnutrition of moderate degree    BMI Findings: Body mass index is 39 63 kg/m²  Bilateral lower extremity edema  Assessment & Plan  · Pt reports to chronic b/l LE edema  Previously was on lasix but this was held by PCP given GI losses (was having diarrhea associated with chemo)  · Pt doesn't think she is ready for it yet  · Would recommend to elevate extremities, compression stockings     Swelling of left upper extremity  Assessment & Plan  · Pt has noted LUE pain and swelling since unsuccessful attempt at PICC placement in that arm  · Check LUE duplex - no evidence of acute or chronic DVT of left upper extremity  No evidence of superficial thrombophlebitis  There is evidence of a non-vascularized structure in the area of the bicep muscle  It did note acute thrombus in IJ vein on the right  ·  These results have been discussed with her previously and felt to be related to port infection  No further changes to the plan at this time    · xarelto was already changed to lovenox last admission  · PRN pain control   · Trial of compression sleeve for edema management     MSSA bacteremia  Assessment & Plan  · In the setting of port a cath and was admitted 9/19-9/29 with sepsis   · Catheter removed on 9/20 now has R PICC placed 9/27  · Repeat cultures have been negative for > 5 days   · TRINH without vegetation  · ID recommended 6 weeks of abx - on day #17 of abx, Cefazolin day #15    Anemia  Assessment & Plan  · Hb 6 7 s/p 1 unit of PRBC - 8 2 - 7 5  · When 7 5 & 7 5 repeated 1  uPRBC -> 9 2 -> 8 2 -  8  Stool occult neg  · No active bleed, brief blood in PCN at night after exchange yesterday, notice of hematuria by night nurses per urethra - consulted urology after d/w Gyn-Onc  · Received transfusion last week as well  · Had EGD on 9/24 without evidence of source of blood loss  · lovenox & aspirin held  · Hb going down persistently  · CT abdomen performed today & neg  · Consult hematology for input on etio of anemia & recs on [facility] which she needs for DVT    Acute deep vein thrombosis (DVT) of proximal vein of lower extremity ([facility] [address] )  Assessment & Plan  · GYN Onc team switched from Eliquis to Lovenox BID permanently - held now d/t anemia  · Consider repeat US as outpatient in 2 weeks or so    Endometrial cancer [facility])  Assessment & Plan  · Appreciate Gynecologic Oncology evaluation on recent admission   · She received Taxol/carboplatin and received last chemotherapy 09/09/2019  · Received granix 08/31/2019, also Neulasta 09/09/2019 and 09/16/2019  · Chemotherapy to be on hold for now given acute infection  · CT CAP - shows some improvement in areas of malignancy, R>L pleural effusion, nodules in lung which can be infectious/inflammatory       Benign essential hypertension  Assessment & Plan  · BP acceptbale, monitor routinely   · Continue home medical management       [facility] Progress Note  Patient: Sammi Clay 60 y o  female   MRN: [id number]  PCP: Joseph Muñoz DO  Unit/Bed#: [facility] 910-01 Encounter: [id number]  Date Of Visit: 10/06/19    Assessment:    Principal Problem:    Generalized weakness  Active Problems:    Benign essential hypertension    Endometrial cancer ([facility] [address] )    Acute deep vein thrombosis (DVT) of proximal vein of lower extremity (HCC)    Anemia    MSSA bacteremia    Swelling of left upper extremity    Bilateral lower extremity edema    Moderate protein-calorie malnutrition ([facility] [address] )      VTE Pharmacologic Prophylaxis:   Pharmacologic: Pharmacologic VTE Prophylaxis contraindicated due to anemia  Mechanical VTE Prophylaxis in Place: Yes    Patient Centered Rounds: I have performed bedside rounds with nursing staff today  Discussions with Specialists or Other Care Team Provider: GYN ONC    Education and Discussions with Family / Patient: patient    Time Spent for Care: 30 minutes  More than 50% of total time spent on counseling and coordination of care as described above  Current Length of Stay: 4 day(s)    Current Patient Status: Inpatient   Certification Statement: The patient will continue to require additional inpatient hospital stay due to monitor hb    Discharge Plan / Estimated Discharge Date:     Code Status: Level 1 - Full Code      Subjective:   Feels ok, no new complains    Objective:     Vitals:   Temp (24hrs), Av 4 °F (36 9 °C), Min:97 5 °F (36 4 °C), Max:98 8 °F (37 1 °C)    Temp:  [97 5 °F (36 4 °C)-98 8 °F (37 1 °C)] 98 8 °F (37 1 °C)  HR:  [83-94] 86  Resp:  [18-22] 18  BP: (153-160)/(83-88) 153/84  SpO2:  [96 %-98 %] 98 %  Body mass index is 39 63 kg/m²  Input and Output Summary (last 24 hours): Intake/Output Summary (Last 24 hours) at 10/6/2019 1829  Last data filed at 10/6/2019 1801  Gross per 24 hour   Intake 1050 ml   Output 2125 ml   Net -1075 ml       Physical Exam:     Physical Exam   Constitutional: She is oriented to person, place, and time  She appears well-developed and well-nourished  HENT:   Head: Normocephalic and atraumatic  Eyes: Conjunctivae are normal    Cardiovascular: Normal rate and regular rhythm  Exam reveals no friction rub  No murmur heard  Pulmonary/Chest: Effort normal and breath sounds normal  No stridor  No respiratory distress  Abdominal: Soft  She exhibits no distension  There is no tenderness  Neurological: She is alert and oriented to person, place, and time     Vitals reviewed  Additional Data:     Labs:    Results from last 7 days   Lab Units 10/06/19  0502   WBC Thousand/uL 6 77   HEMOGLOBIN g/dL 7 7*   HEMATOCRIT % 24 4*   PLATELETS Thousands/uL 116*   NEUTROS PCT % 68   LYMPHS PCT % 18   MONOS PCT % 10   EOS PCT % 1     Results from last 7 days   Lab Units 10/06/19  0502   POTASSIUM mmol/L 4 2   CHLORIDE mmol/L 108   CO2 mmol/L 21   BUN mg/dL 24   CREATININE mg/dL 0 81   CALCIUM mg/dL 8 4           * I Have Reviewed All Lab Data Listed Above  * Additional Pertinent Lab Tests Reviewed: All Labs Within Last 24 Hours Reviewed    Imaging:    Imaging Reports Reviewed Today Include:   Imaging Personally Reviewed by Myself Includes:      Recent Cultures (last 7 days):           Last 24 Hours Medication List:     Current Facility-Administered Medications:  acetaminophen 650 mg Oral Q6H PRN Drena Kalata, PA-C    alteplase 2 mg Intracatheter Once Drena Kalata, PA-C    ARIPiprazole 10 mg Oral Daily Emperatriz E Held, PA-C    benzonatate 200 mg Oral TID PRN Emperatriz E Held, PA-C    ceFAZolin 2,000 mg Intravenous Q8H Emperatriz E Held, PA-C Last Rate: Stopped (10/06/19 1612)   cholecalciferol 1,000 Units Oral Daily Emperatriz E Held, PA-C    iohexol 50 mL Oral 90 min pre-procedure Clover Lee MD    LORazepam 1 mg Oral Q6H PRN Emperatriz E Held, PA-C    pantoprazole 40 mg Oral Early Morning Emperatriz E Held, PA-C    saccharomyces boulardii 250 mg Oral BID Sylwia Moren, PA-C    venlafaxine 75 mg Oral TID Emperatriz E Held, PA-C         Today, Patient Was Seen By: Clover Lee MD    ** Please Note: This note has been constructed using a voice recognition system   **

## 2019-10-06 NOTE — ASSESSMENT & PLAN NOTE
· GYN Onc team switched from Eliquis to Lovenox BID permanently - held now d/t anemia  · Consider repeat US as outpatient in 2 weeks or so

## 2019-10-06 NOTE — ASSESSMENT & PLAN NOTE
· Hb 6 7 s/p 1 unit of PRBC - 8 2 - 7 5  · When 7 5 & 7 5 repeated 1  uPRBC -> 9 2 -> 8 2 -  8  Stool occult neg  · No active bleed, brief blood in PCN at night after exchange yesterday, notice of hematuria by night nurses per urethra - consulted urology after d/w Gyn-Onc  · Received transfusion last week as well  · Had EGD on 9/24 without evidence of source of blood loss  · lovenox & aspirin held  · Hb going down persistently  · CT abdomen performed today & neg  · Consult hematology for input on etio of anemia & recs on [facility] which she needs for DVT

## 2019-10-06 NOTE — PROGRESS NOTES
GYN-ONC Progress Note  Adriana Line  [id number]  [facility]  10/6/2019  10:40 AM    ASSESS: 60year old with recurrent, platinum refractory stage 1B endometrial cancer, ongoing inpatient management in the setting of recent bacteremia from port a cath, readmitted due to deconditioning    PLAN:    Recurrent 1B1 endomatrial cancer: recent carbo/taxol in early September, s/p granix and neulasta, holding chemotherapy, holding treatment currently    Persistent anemia: remains without obvious source of bleeding outside of hematuria, evaluated by urology who discontinued lovenox and aspirin today, Hgb down today at 7 7, consider transfusion, consider GI evaluation for possible source of bleeding    MSSA bacteremia: in the setting of port a cath, ongoing antibiotic treatment x 6 weeks     Dispo: inpatient     PPx: SCDs, anticoagulation held secondary to drop in hemoglobin  FEN: regular  diet  Pain mgmnt: None      Holly Watson, DO    ____________________    SUBJECTIVE  History of Present Illness:  Overnight: no acute events  Pain: no  Tolerating Oral Intake: yes   Voiding: yes  Flatus: yes  Bowel Movement: yes  Ambulating: yes  Vaginal Bleeding: no  Pelvic Pain: no  Chest Pain: no  Shortness of Breath: no  Leg Pain/Discomfort: no    OBJECTIVE  Vitals  Temp:  [97 5 °F (36 4 °C)-99 2 °F (37 3 °C)] 97 5 °F (36 4 °C)  HR:  [83-94] 83  Resp:  [18-22] 22  BP: (154-162)/(83-89) 154/83       Intake/Output Summary (Last 24 hours) at 10/6/2019 1040  Last data filed at 10/6/2019 0553  Gross per 24 hour   Intake 930 ml   Output 1275 ml   Net -345 ml       Physical Exam:   Physical Exam   Constitutional: She is oriented to person, place, and time  Vital signs are normal  She is active  Cardiovascular: Normal rate, regular rhythm, normal heart sounds and intact distal pulses  Pulmonary/Chest: Effort normal and breath sounds normal  No stridor  No respiratory distress  Abdominal: Soft   Bowel sounds are normal  She exhibits no distension  There is no tenderness  Neurological: She is alert and oriented to person, place, and time  Skin: Skin is warm and dry  Psychiatric: She has a normal mood and affect  Her behavior is normal          Labs:   Recent Results (from the past 72 hour(s))   Hemoglobin and hematocrit, blood    Collection Time: 10/03/19 12:07 PM   Result Value Ref Range    Hemoglobin 7 6 (L) 11 5 - 15 4 g/dL    Hematocrit 23 7 (L) 34 8 - 46 1 %   Occult blood 1-3, stool    Collection Time: 10/04/19  5:39 AM   Result Value Ref Range    Fecal Occult Blood Diagnostic Negative Negative    Fecal Occult Blood Diagnostic 2      Fecal Occult Blood Diagnostic 3     Prepare RBC:Has consent been obtained?  Yes, 1 Units, Leukoreduced, Irradiated    Collection Time: 10/04/19  5:55 AM   Result Value Ref Range    Unit Product Code [id number]     Unit Number [id number]     Unit ABO O     Unit [facility] POS     Crossmatch Compatible     Unit Dispense Status Presumed Trans    CBC and differential    Collection Time: 10/04/19  6:07 AM   Result Value Ref Range    WBC 8 96 4 31 - 10 16 Thousand/uL    RBC 3 27 (L) 3 81 - 5 12 Million/uL    Hemoglobin 9 2 (L) 11 5 - 15 4 g/dL    Hematocrit 28 3 (L) 34 8 - 46 1 %    MCV 87 82 - 98 fL    MCH 28 1 26 8 - 34 3 pg    MCHC 32 5 31 4 - 37 4 g/dL    RDW 17 2 (H) 11 6 - 15 1 %    MPV 10 3 8 9 - 12 7 fL    Platelets 903 (L) 717 - 390 Thousands/uL    nRBC 0 /100 WBCs   Manual Differential(PHLEBS Do Not Order)    Collection Time: 10/04/19  6:07 AM   Result Value Ref Range    Segmented % 77 (H) 43 - 75 %    Bands % 1 0 - 8 %    Lymphocytes % 12 (L) 14 - 44 %    Monocytes % 7 4 - 12 %    Eosinophils, % 1 0 - 6 %    Basophils % 0 0 - 1 %    Metamyelocytes% 2 (H) 0 - 1 %    Absolute Neutrophils 6 99 1 85 - 7 62 Thousand/uL    Lymphocytes Absolute 1 08 0 60 - 4 47 Thousand/uL    Monocytes Absolute 0 63 0 00 - 1 22 Thousand/uL    Eosinophils Absolute 0 09 0 00 - 0 40 Thousand/uL    Basophils Absolute 0 00 0 00 - 0 10 Thousand/uL    Total Counted      RBC Morphology Present     Anisocytosis Present     Poikilocytes Present     Platelet Estimate Decreased (A) Adequate   Hemoglobin and hematocrit, blood    Collection Time: 10/04/19  3:48 PM   Result Value Ref Range    Hemoglobin 8 2 (L) 11 5 - 15 4 g/dL    Hematocrit 25 3 (L) 34 8 - 46 1 %   Occult blood 1-3, stool    Collection Time: 10/05/19  4:02 AM   Result Value Ref Range    Fecal Occult Blood Diagnostic      Fecal Occult Blood Diagnostic 2 Negative Negative    Fecal Occult Blood Diagnostic 3     CBC and differential    Collection Time: 10/05/19  5:21 AM   Result Value Ref Range    WBC 7 91 4 31 - 10 16 Thousand/uL    RBC 2 84 (L) 3 81 - 5 12 Million/uL    Hemoglobin 8 0 (L) 11 5 - 15 4 g/dL    Hematocrit 24 8 (L) 34 8 - 46 1 %    MCV 87 82 - 98 fL    MCH 28 2 26 8 - 34 3 pg    MCHC 32 3 31 4 - 37 4 g/dL    RDW 17 1 (H) 11 6 - 15 1 %    MPV 10 7 8 9 - 12 7 fL    Platelets 633 (L) 815 - 390 Thousands/uL    nRBC 0 /100 WBCs    Neutrophils Relative 70 43 - 75 %    Immat GRANS % 3 (H) 0 - 2 %    Lymphocytes Relative 15 14 - 44 %    Monocytes Relative 10 4 - 12 %    Eosinophils Relative 1 0 - 6 %    Basophils Relative 1 0 - 1 %    Neutrophils Absolute 5 54 1 85 - 7 62 Thousands/µL    Immature Grans Absolute 0 25 (H) 0 00 - 0 20 Thousand/uL    Lymphocytes Absolute 1 22 0 60 - 4 47 Thousands/µL    Monocytes Absolute 0 81 0 17 - 1 22 Thousand/µL    Eosinophils Absolute 0 05 0 00 - 0 61 Thousand/µL    Basophils Absolute 0 04 0 00 - 0 10 Thousands/µL   Hemoglobin and hematocrit, blood    Collection Time: 10/05/19  3:53 PM   Result Value Ref Range    Hemoglobin 8 0 (L) 11 5 - 15 4 g/dL    Hematocrit 25 0 (L) 34 8 - 46 1 %   CBC and differential    Collection Time: 10/06/19  5:02 AM   Result Value Ref Range    WBC 6 77 4 31 - 10 16 Thousand/uL    RBC 2 75 (L) 3 81 - 5 12 Million/uL    Hemoglobin 7 7 (L) 11 5 - 15 4 g/dL    Hematocrit 24 4 (L) 34 8 - 46 1 %    MCV 89 82 - 98 fL MCH 28 0 26 8 - 34 3 pg    MCHC 31 6 31 4 - 37 4 g/dL    RDW 17 2 (H) 11 6 - 15 1 %    MPV 9 3 8 9 - 12 7 fL    Platelets 908 (L) 697 - 390 Thousands/uL    nRBC 0 /100 WBCs    Neutrophils Relative 68 43 - 75 %    Immat GRANS % 3 (H) 0 - 2 %    Lymphocytes Relative 18 14 - 44 %    Monocytes Relative 10 4 - 12 %    Eosinophils Relative 1 0 - 6 %    Basophils Relative 0 0 - 1 %    Neutrophils Absolute 4 61 1 85 - 7 62 Thousands/µL    Immature Grans Absolute 0 17 0 00 - 0 20 Thousand/uL    Lymphocytes Absolute 1 22 0 60 - 4 47 Thousands/µL    Monocytes Absolute 0 68 0 17 - 1 22 Thousand/µL    Eosinophils Absolute 0 06 0 00 - 0 61 Thousand/µL    Basophils Absolute 0 03 0 00 - 0 10 Thousands/µL   Basic metabolic panel    Collection Time: 10/06/19  5:02 AM   Result Value Ref Range    Sodium 137 136 - 145 mmol/L    Potassium 4 2 3 5 - 5 3 mmol/L    Chloride 108 100 - 108 mmol/L    CO2 21 21 - 32 mmol/L    ANION GAP 8 4 - 13 mmol/L    BUN 24 5 - 25 mg/dL    Creatinine 0 81 0 60 - 1 30 mg/dL    Glucose 79 65 - 140 mg/dL    Calcium 8 4 8 3 - 10 1 mg/dL    eGFR 78 ml/min/1 73sq m       Meds:  Scheduled Meds:  Current Facility-Administered Medications:  acetaminophen 650 mg Oral Q6H PRN Marvetta , PA-C    alteplase 2 mg Intracatheter Once Marvetta , PA-C    ARIPiprazole 10 mg Oral Daily Emperatriz E Held, PA-C    benzonatate 200 mg Oral TID PRN Emperatriz E Held, PA-C    ceFAZolin 2,000 mg Intravenous Q8H Emperatriz E Held, PA-C Last Rate: Stopped (10/06/19 0919)   cholecalciferol 1,000 Units Oral Daily Emperatriz E Held, PA-C    LORazepam 1 mg Oral Q6H PRN Emperatriz E Held, PA-C    pantoprazole 40 mg Oral Early Morning Emperatriz E Held, PA-C    saccharomyces boulardii 250 mg Oral BID Sylwia Cobb, PA-C    venlafaxine 75 mg Oral TID Emperatriz E Held, PA-C      Continuous Infusions:   PRN Meds:   acetaminophen    benzonatate    LORazepam    Imaging Studies: I have personally reviewed pertinent reports        EKG, Pathology, Microbiology, and Other Studies: I have personally reviewed pertinent reports        __________________    Signature / Title: Oh Narayan DO, Ob/Gyn, PGY-3  Date: 10/6/2019  Time: 10:40 AM

## 2019-10-06 NOTE — PLAN OF CARE
Problem: Prexisting or High Potential for Compromised Skin Integrity  Goal: Skin integrity is maintained or improved  Description  INTERVENTIONS:  - Identify patients at risk for skin breakdown  - Assess and monitor skin integrity  - Assess and monitor nutrition and hydration status  - Monitor labs   - Assess for incontinence   - Turn and reposition patient  - Assist with mobility/ambulation  - Relieve pressure over bony prominences  - Avoid friction and shearing  - Provide appropriate hygiene as needed including keeping skin clean and dry  - Evaluate need for skin moisturizer/barrier cream  - Collaborate with interdisciplinary team   - Patient/family teaching  - Consider wound care consult   Outcome: Progressing     Problem: Potential for Falls  Goal: Patient will remain free of falls  Description  INTERVENTIONS:  - Assess patient frequently for physical needs  -  Identify cognitive and physical deficits and behaviors that affect risk of falls  -  [place] fall precautions as indicated by assessment   - Educate patient/family on patient safety including physical limitations  - Instruct patient to call for assistance with activity based on assessment  - Modify environment to reduce risk of injury  - Consider OT/PT consult to assist with strengthening/mobility  Outcome: Progressing     Problem: Nutrition/Hydration-ADULT  Goal: Nutrient/Hydration intake appropriate for improving, restoring or maintaining nutritional needs  Description  Monitor and assess patient's nutrition/hydration status for malnutrition  Collaborate with interdisciplinary team and initiate plan and interventions as ordered  Monitor patient's weight and dietary intake as ordered or per policy  Utilize nutrition screening tool and intervene as necessary  Determine patient's food preferences and provide high-protein, high-caloric foods as appropriate       INTERVENTIONS:  - Monitor oral intake, urinary output, labs, and treatment plans  - Assess nutrition and hydration status and recommend course of action  - Evaluate amount of meals eaten  - Assist patient with eating if necessary   - Allow adequate time for meals  - Recommend/ encourage appropriate diets, oral nutritional supplements, and vitamin/mineral supplements  - Order, calculate, and assess calorie counts as needed  - Recommend, monitor, and adjust tube feedings and TPN/PPN based on assessed needs  - Assess need for intravenous fluids  - Provide specific nutrition/hydration education as appropriate  - Include patient/family/caregiver in decisions related to nutrition  Outcome: Progressing     Problem: PAIN - ADULT  Goal: Verbalizes/displays adequate comfort level or baseline comfort level  Description  Interventions:  - Encourage patient to monitor pain and request assistance  - Assess pain using appropriate pain scale  - Administer analgesics based on type and severity of pain and evaluate response  - Implement non-pharmacological measures as appropriate and evaluate response  - Consider cultural and social influences on pain and pain management  - Notify physician/advanced practitioner if interventions unsuccessful or patient reports new pain  Outcome: Progressing     Problem: INFECTION - ADULT  Goal: Absence or prevention of progression during hospitalization  Description  INTERVENTIONS:  - Assess and monitor for signs and symptoms of infection  - Monitor lab/diagnostic results  - Monitor all insertion sites, i e  indwelling lines, tubes, and drains  - Monitor endotracheal if appropriate and nasal secretions for changes in amount and color  - [place] appropriate cooling/warming therapies per order  - Administer medications as ordered  - Instruct and encourage patient and family to use good hand hygiene technique  - Identify and instruct in appropriate isolation precautions for identified infection/condition  Outcome: Progressing     Problem: SAFETY ADULT  Goal: Maintain or return to baseline ADL function  Description  INTERVENTIONS:  -  Assess patient's ability to carry out ADLs; assess patient's baseline for ADL function and identify physical deficits which impact ability to perform ADLs (bathing, care of mouth/teeth, toileting, grooming, dressing, etc )  - Assess/evaluate cause of self-care deficits   - Assess range of motion  - Assess patient's mobility; develop plan if impaired  - Assess patient's need for assistive devices and provide as appropriate  - Encourage maximum independence but intervene and supervise when necessary  - Involve family in performance of ADLs  - Assess for home care needs following discharge   - Consider OT consult to assist with ADL evaluation and planning for discharge  - Provide patient education as appropriate  Outcome: Progressing  Goal: Maintain or return mobility status to optimal level  Description  INTERVENTIONS:  - Assess patient's baseline mobility status (ambulation, transfers, stairs, etc )    - Identify cognitive and physical deficits and behaviors that affect mobility  - Identify mobility aids required to assist with transfers and/or ambulation (gait belt, sit-to-stand, lift, walker, cane, etc )  - [place] fall precautions as indicated by assessment  - Record patient progress and toleration of activity level on Mobility SBAR; progress patient to next Phase/Stage  - Instruct patient to call for assistance with activity based on assessment  - Consider rehabilitation consult to assist with strengthening/weightbearing, etc   Outcome: Progressing     Problem: DISCHARGE PLANNING  Goal: Discharge to home or other facility with appropriate resources  Description  INTERVENTIONS:  - Identify barriers to discharge w/patient and caregiver  - Arrange for needed discharge resources and transportation as appropriate  - Identify discharge learning needs (meds, wound care, etc )  - Arrange for interpretive services to assist at discharge as needed  - Refer to Case Management Department for coordinating discharge planning if the patient needs post-hospital services based on physician/advanced practitioner order or complex needs related to functional status, cognitive ability, or social support system  Outcome: Progressing

## 2019-10-06 NOTE — ASSESSMENT & PLAN NOTE
· In the setting of port a cath and was admitted 9/19-9/29 with sepsis   · Catheter removed on 9/20 now has R PICC placed 9/27  · Repeat cultures have been negative for > 5 days   · TRINH without vegetation  · ID recommended 6 weeks of abx - on day #17 of abx, Cefazolin day #15

## 2019-10-06 NOTE — PLAN OF CARE
Problem: Prexisting or High Potential for Compromised Skin Integrity  Goal: Skin integrity is maintained or improved  Description  INTERVENTIONS:  - Identify patients at risk for skin breakdown  - Assess and monitor skin integrity  - Assess and monitor nutrition and hydration status  - Monitor labs   - Assess for incontinence   - Turn and reposition patient  - Assist with mobility/ambulation  - Relieve pressure over bony prominences  - Avoid friction and shearing  - Provide appropriate hygiene as needed including keeping skin clean and dry  - Evaluate need for skin moisturizer/barrier cream  - Collaborate with interdisciplinary team   - Patient/family teaching  - Consider wound care consult   Outcome: Progressing     Problem: Potential for Falls  Goal: Patient will remain free of falls  Description  INTERVENTIONS:  - Assess patient frequently for physical needs  -  Identify cognitive and physical deficits and behaviors that affect risk of falls  -  [place] fall precautions as indicated by assessment   - Educate patient/family on patient safety including physical limitations  - Instruct patient to call for assistance with activity based on assessment  - Modify environment to reduce risk of injury  - Consider OT/PT consult to assist with strengthening/mobility  Outcome: Progressing     Problem: Nutrition/Hydration-ADULT  Goal: Nutrient/Hydration intake appropriate for improving, restoring or maintaining nutritional needs  Description  Monitor and assess patient's nutrition/hydration status for malnutrition  Collaborate with interdisciplinary team and initiate plan and interventions as ordered  Monitor patient's weight and dietary intake as ordered or per policy  Utilize nutrition screening tool and intervene as necessary  Determine patient's food preferences and provide high-protein, high-caloric foods as appropriate       INTERVENTIONS:  - Monitor oral intake, urinary output, labs, and treatment plans  - Assess nutrition and hydration status and recommend course of action  - Evaluate amount of meals eaten  - Assist patient with eating if necessary   - Allow adequate time for meals  - Recommend/ encourage appropriate diets, oral nutritional supplements, and vitamin/mineral supplements  - Order, calculate, and assess calorie counts as needed  - Recommend, monitor, and adjust tube feedings and TPN/PPN based on assessed needs  - Assess need for intravenous fluids  - Provide specific nutrition/hydration education as appropriate  - Include patient/family/caregiver in decisions related to nutrition  Outcome: Progressing     Problem: PAIN - ADULT  Goal: Verbalizes/displays adequate comfort level or baseline comfort level  Description  Interventions:  - Encourage patient to monitor pain and request assistance  - Assess pain using appropriate pain scale  - Administer analgesics based on type and severity of pain and evaluate response  - Implement non-pharmacological measures as appropriate and evaluate response  - Consider cultural and social influences on pain and pain management  - Notify physician/advanced practitioner if interventions unsuccessful or patient reports new pain  Outcome: Progressing     Problem: INFECTION - ADULT  Goal: Absence or prevention of progression during hospitalization  Description  INTERVENTIONS:  - Assess and monitor for signs and symptoms of infection  - Monitor lab/diagnostic results  - Monitor all insertion sites, i e  indwelling lines, tubes, and drains  - Monitor endotracheal if appropriate and nasal secretions for changes in amount and color  - [place] appropriate cooling/warming therapies per order  - Administer medications as ordered  - Instruct and encourage patient and family to use good hand hygiene technique  - Identify and instruct in appropriate isolation precautions for identified infection/condition  Outcome: Progressing     Problem: SAFETY ADULT  Goal: Maintain or return to baseline ADL function  Description  INTERVENTIONS:  -  Assess patient's ability to carry out ADLs; assess patient's baseline for ADL function and identify physical deficits which impact ability to perform ADLs (bathing, care of mouth/teeth, toileting, grooming, dressing, etc )  - Assess/evaluate cause of self-care deficits   - Assess range of motion  - Assess patient's mobility; develop plan if impaired  - Assess patient's need for assistive devices and provide as appropriate  - Encourage maximum independence but intervene and supervise when necessary  - Involve family in performance of ADLs  - Assess for home care needs following discharge   - Consider OT consult to assist with ADL evaluation and planning for discharge  - Provide patient education as appropriate  Outcome: Progressing  Goal: Maintain or return mobility status to optimal level  Description  INTERVENTIONS:  - Assess patient's baseline mobility status (ambulation, transfers, stairs, etc )    - Identify cognitive and physical deficits and behaviors that affect mobility  - Identify mobility aids required to assist with transfers and/or ambulation (gait belt, sit-to-stand, lift, walker, cane, etc )  - [place] fall precautions as indicated by assessment  - Record patient progress and toleration of activity level on Mobility SBAR; progress patient to next Phase/Stage  - Instruct patient to call for assistance with activity based on assessment  - Consider rehabilitation consult to assist with strengthening/weightbearing, etc   Outcome: Progressing     Problem: DISCHARGE PLANNING  Goal: Discharge to home or other facility with appropriate resources  Description  INTERVENTIONS:  - Identify barriers to discharge w/patient and caregiver  - Arrange for needed discharge resources and transportation as appropriate  - Identify discharge learning needs (meds, wound care, etc )  - Arrange for interpretive services to assist at discharge as needed  - Refer to Case Management Department for coordinating discharge planning if the patient needs post-hospital services based on physician/advanced practitioner order or complex needs related to functional status, cognitive ability, or social support system  Outcome: Progressing

## 2019-10-07 LAB
BASOPHILS # BLD AUTO: 0.01 THOUSANDS/ΜL (ref 0–0.1)
BASOPHILS NFR BLD AUTO: 0 % (ref 0–1)
EOSINOPHIL # BLD AUTO: 0.08 THOUSAND/ΜL (ref 0–0.61)
EOSINOPHIL NFR BLD AUTO: 1 % (ref 0–6)
ERYTHROCYTE [DISTWIDTH] IN BLOOD BY AUTOMATED COUNT: 17 % (ref 11.6–15.1)
FOLATE SERPL-MCNC: 5.4 NG/ML (ref 3.1–17.5)
HCT VFR BLD AUTO: 24 % (ref 34.8–46.1)
HGB BLD-MCNC: 7.6 G/DL (ref 11.5–15.4)
IMM GRANULOCYTES # BLD AUTO: 0.13 THOUSAND/UL (ref 0–0.2)
IMM GRANULOCYTES NFR BLD AUTO: 2 % (ref 0–2)
LYMPHOCYTES # BLD AUTO: 1.37 THOUSANDS/ΜL (ref 0.6–4.47)
LYMPHOCYTES NFR BLD AUTO: 21 % (ref 14–44)
MCH RBC QN AUTO: 28 PG (ref 26.8–34.3)
MCHC RBC AUTO-ENTMCNC: 31.7 G/DL (ref 31.4–37.4)
MCV RBC AUTO: 89 FL (ref 82–98)
MONOCYTES # BLD AUTO: 0.67 THOUSAND/ΜL (ref 0.17–1.22)
MONOCYTES NFR BLD AUTO: 10 % (ref 4–12)
NEUTROPHILS # BLD AUTO: 4.26 THOUSANDS/ΜL (ref 1.85–7.62)
NEUTS SEG NFR BLD AUTO: 66 % (ref 43–75)
NRBC BLD AUTO-RTO: 0 /100 WBCS
PLATELET # BLD AUTO: 128 THOUSANDS/UL (ref 149–390)
PMV BLD AUTO: 10.5 FL (ref 8.9–12.7)
RBC # BLD AUTO: 2.71 MILLION/UL (ref 3.81–5.12)
RETICS # AUTO: ABNORMAL 10*3/UL (ref 14097–95744)
RETICS # CALC: 2.26 % (ref 0.37–1.87)
TSH SERPL DL<=0.05 MIU/L-ACNC: 1.71 UIU/ML (ref 0.36–3.74)
VIT B12 SERPL-MCNC: 1777 PG/ML (ref 100–900)
WBC # BLD AUTO: 6.52 THOUSAND/UL (ref 4.31–10.16)

## 2019-10-07 PROCEDURE — 83918 ORGANIC ACIDS TOTAL QUANT: CPT | Performed by: PHYSICIAN ASSISTANT

## 2019-10-07 PROCEDURE — 99232 SBSQ HOSP IP/OBS MODERATE 35: CPT | Performed by: OBSTETRICS & GYNECOLOGY

## 2019-10-07 PROCEDURE — 82607 VITAMIN B-12: CPT | Performed by: PHYSICIAN ASSISTANT

## 2019-10-07 PROCEDURE — 99223 1ST HOSP IP/OBS HIGH 75: CPT | Performed by: INTERNAL MEDICINE

## 2019-10-07 PROCEDURE — 85045 AUTOMATED RETICULOCYTE COUNT: CPT | Performed by: PHYSICIAN ASSISTANT

## 2019-10-07 PROCEDURE — 97110 THERAPEUTIC EXERCISES: CPT

## 2019-10-07 PROCEDURE — 85025 COMPLETE CBC W/AUTO DIFF WBC: CPT | Performed by: HOSPITALIST

## 2019-10-07 PROCEDURE — 97535 SELF CARE MNGMENT TRAINING: CPT

## 2019-10-07 PROCEDURE — 99232 SBSQ HOSP IP/OBS MODERATE 35: CPT | Performed by: HOSPITALIST

## 2019-10-07 RX ORDER — ASPIRIN 81 MG/1
81 TABLET ORAL DAILY
Status: DISCONTINUED | OUTPATIENT
Start: 2019-10-07 | End: 2019-10-08 | Stop reason: HOSPADM

## 2019-10-07 RX ADMIN — Medication 250 MG: at 08:37

## 2019-10-07 RX ADMIN — CEFAZOLIN SODIUM 2000 MG: 2 SOLUTION INTRAVENOUS at 08:37

## 2019-10-07 RX ADMIN — VENLAFAXINE 75 MG: 37.5 TABLET ORAL at 08:37

## 2019-10-07 RX ADMIN — ASPIRIN 81 MG: 81 TABLET, COATED ORAL at 17:41

## 2019-10-07 RX ADMIN — Medication 250 MG: at 17:34

## 2019-10-07 RX ADMIN — LORAZEPAM 1 MG: 1 TABLET ORAL at 20:03

## 2019-10-07 RX ADMIN — VENLAFAXINE 75 MG: 37.5 TABLET ORAL at 21:14

## 2019-10-07 RX ADMIN — PANTOPRAZOLE SODIUM 40 MG: 40 TABLET, DELAYED RELEASE ORAL at 05:25

## 2019-10-07 RX ADMIN — CEFAZOLIN SODIUM 2000 MG: 2 SOLUTION INTRAVENOUS at 17:34

## 2019-10-07 RX ADMIN — ARIPIPRAZOLE 10 MG: 10 TABLET ORAL at 08:37

## 2019-10-07 RX ADMIN — VENLAFAXINE 75 MG: 37.5 TABLET ORAL at 17:35

## 2019-10-07 RX ADMIN — MELATONIN 1000 UNITS: at 08:37

## 2019-10-07 RX ADMIN — LORAZEPAM 1 MG: 1 TABLET ORAL at 03:19

## 2019-10-07 RX ADMIN — ENOXAPARIN SODIUM 90 MG: 100 INJECTION SUBCUTANEOUS at 21:14

## 2019-10-07 RX ADMIN — CEFAZOLIN SODIUM 2000 MG: 2 SOLUTION INTRAVENOUS at 23:14

## 2019-10-07 NOTE — PLAN OF CARE
Problem: OCCUPATIONAL THERAPY ADULT  Goal: Performs self-care activities at highest level of function for planned discharge setting  See evaluation for individualized goals  Description  Treatment Interventions: ADL retraining, Functional transfer training, UE strengthening/ROM, Endurance training, Patient/family training, Equipment evaluation/education, Energy conservation, Activityengagement  Equipment Recommended: Bedside commode       See flowsheet documentation for full assessment, interventions and recommendations  Outcome: Progressing  Note:   Limitation: Decreased ADL status, Decreased UE ROM, Decreased UE strength, Decreased endurance, Decreased self-care trans, Decreased high-level ADLs  Prognosis: Good  Assessment: Patient participated in Skilled OT session this date with interventions consisting of ADL re-training, UE strengthening, functional transfers/mobility  Patient agreeable to OT treatment session, upon arrival patient was found supine in bed  Pt currently limited by decreased activity tolerance/fatigue/weakness  Patient requiring occasional seated rest breaks 2' fatigue  Pt requiring MIN A for functional transfers and SUP for short distance household functional mobility  Pt requiring MOD A to don/doff socks  Pt provided with squeeze ball for  strengthening and participated in UE there-ex with Good understanding; pt encouraged to participate as HEP and to continue functional use of UE during ADL tasks  Patient continues to be functioning below baseline level, occupational performance remains limited secondary to factors listed above and increased risk for falls and injury  From OT standpoint, recommendation at time of d/c would be Short Term Rehab  Patient to benefit from continued Occupational Therapy treatment while in the hospital to address deficits as defined above and maximize level of functional independence with ADLs and functional mobility        OT Discharge Recommendation: Short Term Rehab  OT - OK to Discharge: (when medically cleared)

## 2019-10-07 NOTE — RESTORATIVE TECHNICIAN NOTE
Restorative Specialist Mobility Note       Activity: Ambulate in mehta, Ambulate in room, Bathroom privileges, Chair, Dangle, Stand at bedside(Educated/encouraged pt to ambulate with assistance 3-4 x's/day  Chair alarm on   Pt callbell, phone/tray within reach )     Assistive Device: Front wheel walker       [employer] BS, Restorative Technician, [employer]

## 2019-10-07 NOTE — PROGRESS NOTES
Gyn Oncology Progress note   Suzan Melendez 60 y o  female MRN: [id number]  Unit/Bed#: [facility] 910-01 Encounter: [id number]    Assessment: 60 y o  recurrent platinum refractory stage IB endometrial cancer, ongoing inpatient management in setting of recent bacteremia from [facility] a Catheter, persistent anemia refractory to transfusions, readmission due to deconditioning     Plan:    1) endometrial cancer   - carboplatin/Taxol with last treatment on 19  -status post chronic aches on 19 for chemo induced leukopenia  -her last initiated 19  -treatment hold currently due to sepsis  -CT scan shows new 4 mm lung nodule, possible metastasis    2) anemia  - persistent despite multiple transfusions, no obvious source of active bleeding  -with recommend transfusion to hemoglobin of >8  -follow-up urology and Hematology recommendations  -CT scan does not provide information explanation of anemia  -GI consultation will be considering; FOBT negative, declined nausea vomiting  -Lovenox and aspirin held    3) MSSA bacteremia  - In setting of Port A catheter which removed tone   - infectious disease recommendations 6 weeks of antibiotics, cefazolin day #18      Subjective: Pradeep Orona has no current complaints  Pain is well controlled with Percocet  Patient is currently has nephrostomy tube  She is ambulating  Patient is currently passing flatus and has had bowel movement  She is tolerating PO, and denies nausea or vomitting  Patient denies fever, chills, chest pain, shortness of breath, or calf tenderness  /82   Pulse 86   Temp 99 °F (37 2 °C)   Resp 18   Ht 4' 11" (1 499 m)   Wt 89 kg (196 lb 3 4 oz) Comment:   LMP  (LMP Unknown)   SpO2 95%   BMI 39 63 kg/m²     I/O last 3 completed shifts:   In: 7163 [P O :1120; IV Piggyback:50]  Out: 8659 [ZWZS]  I/O this shift:  In: 290 [P O :240; IV Piggyback:50]  Out: 1300 [Urine:1300]    Lab Results   Component Value Date    WBC 6 77 10/06/2019    HGB 7 7 (L) 10/06/2019    HCT 24 4 (L) 10/06/2019    MCV 89 10/06/2019     (L) 10/06/2019       Lab Results   Component Value Date    GLUCOSE 219 (H) 12/19/2017    CALCIUM 8 4 10/06/2019     10/27/2017    K 4 2 10/06/2019    CO2 21 10/06/2019     10/06/2019    BUN 24 10/06/2019    CREATININE 0 81 10/06/2019           Physical Exam   Constitutional: She is oriented to person, place, and time  She appears well-developed and well-nourished  No distress  Cardiovascular: Normal rate, regular rhythm and normal heart sounds  No murmur heard  Pulmonary/Chest: Effort normal and breath sounds normal  No stridor  No respiratory distress  She has no wheezes  Abdominal: Soft  Musculoskeletal: Normal range of motion  Neurological: She is alert and oriented to person, place, and time  Skin: Skin is warm  She is not diaphoretic  Psychiatric: She has a normal mood and affect   Her behavior is normal          Jarrett Prado MD  84/2/1077  2:27 AM

## 2019-10-07 NOTE — ASSESSMENT & PLAN NOTE
· Hb 6 7 s/p 1 unit of PRBC - 8 2 - 7 5  · When 7 5 & 7 5 repeated 1  uPRBC -> 9 2 -> 8 2 -  8  Stool occult neg  · No active bleed, brief blood in PCN at night after exchange yesterday, notice of hematuria by night nurses per urethra - consulted urology but doesn't seem an active issue  · Received transfusion last week as well  · Had EGD on 9/24 without evidence of source of blood loss  · lovenox & aspirin restarted today  · Hb going down slowly  · CT abdomen performed again & neg  · Consulted hematology - they think it is just multifactorial, transfuse prn, give her time  · After restarting lovenox check hb in am - if < 8 then transfuse her   If not below 7 then can discharge after transfusion

## 2019-10-07 NOTE — DISCHARGE INSTR - AVS FIRST PAGE
Check right upper extremity US duplex around 10/14 to evaluate the right IJ thrombus to make sure it is not worsening    Discharge with the PICC line in place & remove after the last dose of antibiotic on 11/4/19

## 2019-10-07 NOTE — ASSESSMENT & PLAN NOTE
· GYN Onc team switched from Eliquis to Lovenox BID permanently - held now d/t anemia but restarted today per Hemonc  · Consider repeat US as outpatient in 2 weeks or so

## 2019-10-07 NOTE — ASSESSMENT & PLAN NOTE
· Discharged from [facility] 9/29 with recommended home PT 3-5x/week however represented to [facility] ED after being unable to lift her right leg to go up 2 stairs at home   · Transferred to [facility] for continuity of care   · Likely 2/2 physical decondition in the setting of recurrent endometrial cancer   · Monitor hemoglobin and transfuse prn   · Fall precautions  · PT/OT consults placed - recommending rehab   · Patient had refused rehab on prior admission but is now agreeable, case management following

## 2019-10-07 NOTE — SOCIAL WORK
Lety 48, [address] [facility], [phone] to assist with DCP needs    Requested her name and be provided to patient which was done

## 2019-10-07 NOTE — ASSESSMENT & PLAN NOTE
· In the setting of port a cath and was admitted 9/19-9/29 with sepsis   · Catheter removed on 9/20 now has R PICC placed 9/27  · Repeat cultures have been negative for > 5 days   · TRINH without vegetation  · ID recommended 6 weeks of abx - on day #18 of abx, Cefazolin day #16, tentative end date 11/4/19

## 2019-10-07 NOTE — CONSULTS
Medical Oncology/Hematology Consult Note  Dwaine Gutierrez, female, 60 y o , 1955,  PPHP 910/PPHP 910-01, [id number]     Assessment and Plan  1  Anemia    44-year-old female with recurrent endometrial cancer presently receiving chemotherapy who presented to the ED on 9/19/19 and found to have MSSA bacteremia secondary to port a cath  She continues on IV antibiotics  She had EGD on 9/24/19 which was negative to bleeding source  Occult stool testing is negative  She does have history of gastric bypass surgery  Iron panel is acceptable  Will evaluate B12, folate, MMA due to possible baseline deficiency in context of malabsorption with gastric bypass  CT abdomen and pelvis yesterday was negative for source of blood loss  Stools are normal    Retic count to evaluate for possible hemolysis but this is less likely since MCV is normal  TSH to also be assessed  Discussed with patient that anemia is related to recent chemo as well as recent infection however would be expecting recovery at this time  Her last chemotherapy was on 9/9/19  If work up is negative and hemoglobin continues to decrease, would need to consider bone marrow biopsy  2  Anticoagulation    Patient was on Lovenox BID due to DVT of right external iliac vein (diagnosed on 5/30/19); acute thrombus in the internal jugular vein was diagnosed on 10/1/19 -- secondary to port a cath which has been removed  Anticoagulation is currently on hold due to worsening anemia of unclear etiology  Recommend reinitiating of Lovenox due to no overt signs of bleeding  Reason for consultation: anemia, thrombocytopenia     History of present illness:   44-year-old female with history of recurrent stage 1B endometrial cancer  She was diagnosed in November 2017  She underwent the robot assisted total laparoscopic hysterectomy with BSO and sentinel bilateral pelvic lymph node dissection    Tumor measured 4 4 x 3 2 cm, 9 4/15 4 mm invasion, no LVSI, washings revealed atypical cellular changes  Genetic testing in December 2017 was negative for Morrison syndrome  July 2019 patient presented with lower extremity DVT  CT scan of the chest abdomen and pelvis showed a 5 cm right-sided pelvic mass encasing the right femoral artery and vein as well as the right ureter  CT of the chest was negative  In July 2019 patient was started on chemotherapy  She has been receiving carboplatin AUC 6, Taxol 175 milligrams/meter squared every 21 days  Following 2nd cycle patient developed leukopenia  She was given g neck is  Neulasta was then added to subsequent chemotherapy  Patient had 3rd cycle of chemotherapy on 09/09/2019  Patient presented to the emergency department on 09/19/2019 due to generalized weakness and fevers as well as shortness of breath  In the ED she was found to be febrile, tachycardic, leukocytosis present  She was found to have MSSA bacteremia  Port was removed secondary to infection as well as blood clot  Patient was on outpatient management with anticoagulation with Eliquis  Due to significant anemia patient was switched to Lovenox b i d  permanently  She was discharged on 09/29/2019  She presented back to the emergency department on day of discharge 09/29/2019 secondary to weakness of the lower extremities, unable to lift her right leg in order to go up the steps into her house  She presently has a PICC line for IV antibiotics secondary to her MSSA bacteremia  Repeat blood cultures during this admission have been negative for greater than 5 days  She had a RTINH without any evidence of vegetation  Baseline hemoglobin prior to 9/19 admission was in the 8-9 g range  During admission for sepsis hemoglobin was 6 8-8 0 g/dL  Slight thrombocytopenia has been present since 10/2/19  Patient had an EGD on 09/24/2019    Findings note that esophagus was normal, very small gastric pouch common normal gastrojejunal anastomosis  No of gastric bypass surgery anatomy  Iron panel on 10/2/19 showed iron 24, iron saturation 17%, TIBC 138, ferritin 1042  Occult stool testing has been negative  Patient had percutaneous nephrostomy tube exchange on 10/05/2019  There was notice of hematuria by nighttime nurses following this  Urology consulted  Recommendations were to monitor H&H  If hematuria or anemia worsened consideration for iron renal arteriogram and possible embolization for underlying bleeding  Recommended to hold Lovenox and aspirin  A repeat CT of the abdomen and pelvis was obtained on 10/06/2019  This did not show any bleeding present  There is a 4 mm right lower lobe nodule noted  Stable right external iliac lymphadenopathy  Right percutaneous nephroureteral catheter in place, no hydronephrosis  Patient had 1 unit PRBC on 10/3 and 10/4  Review of Systems:   Review of Systems   Constitutional: Positive for appetite change (since starting chemotherapy) and fatigue (improved )  HENT: Negative for trouble swallowing  Respiratory: Negative for shortness of breath  Cardiovascular: Negative for leg swelling  Gastrointestinal: Negative for abdominal pain, blood in stool, constipation, diarrhea, nausea and vomiting  Denies black stools    Genitourinary: Negative for hematuria  Musculoskeletal: Negative for arthralgias  Skin: Negative  Neurological: Negative for dizziness, light-headedness and headaches  Psychiatric/Behavioral: Negative          Past Medical History:   Diagnosis Date    Anemia     Chemotherapy follow-up examination     Depression     Diabetes mellitus ([facility] [address] )     Endometrial cancer ([facility] [address] ) 12/2017    Hyperglycemia     vx type 2 dm -- last assessed 4/1/14; resolved 11/7/17    Hyperlipidemia     Hypertension     Obesity     last assessed 10/14/17; resolved 11/7/17       Past Surgical History:   Procedure Laterality Date    ABDOMINAL SURGERY      GASTRIC BYPASS  CHOLECYSTECTOMY      at the time of gastric bypass    COLONOSCOPY      CT NEEDLE BIOPSY LYMPH NODE  7/8/2019    GASTRIC BYPASS      HYSTERECTOMY Bilateral     total abdominal with salpingo-oophorectomy    IR PICC LINE  9/27/2019    IR PORT PLACEMENT  7/26/2019    IR PORT REMOVAL  9/20/2019    IR TUBE PLACEMENT NEPHROSTOMY  7/26/2019    OOPHORECTOMY Bilateral     IA LAP, RADICAL HYST W/ TUBE&OV, NODE BX N/A 12/19/2017    Procedure: HYSTERECTOMY LAPAROSCOPIC TOTAL ([address]) W/ ROBOTICS; BILATERAL SALPINGOOPHERECTOMY; LYMPH NODE DISSECTION; lysis of adhesions;  Surgeon: Tonie Vieyra MD;  [facility]: [facility];  Service: Gynecology Oncology    IA LAP,DIAGNOSTIC ABDOMEN N/A 12/19/2017    Procedure: LAPAROSCOPY DIAGNOSTIC;  Surgeon: Tonie Vieyra MD;  [facility];  Service: Gynecology Oncology    TONSILLECTOMY      US GUIDED BREAST BIOPSY RIGHT COMPLETE Right 6/28/2019       Family History   Problem Relation Age of Onset    Other Mother         dyslipidemia    Ovarian cancer Mother 48    Lymphoma Father     Bone cancer Maternal Grandfather     No Known Problems Sister     No Known Problems Maternal Grandmother     No Known Problems Paternal Grandmother     No Known Problems Paternal Grandfather     No Known Problems Maternal Aunt     No Known Problems Maternal Aunt     No Known Problems Maternal Aunt     No Known Problems Maternal Aunt     No Known Problems Paternal Aunt     No Known Problems Paternal Aunt     No Known Problems Paternal Aunt     No Known Problems Paternal Aunt     No Known Problems Brother     No Known Problems Brother        Social History     Socioeconomic History    Marital status: Single     Spouse name: Not on file    Number of children: Not on file    Years of education: Not on file    Highest education level: Not on file   Occupational History    Not on file   Social Needs    Financial resource strain: Not on file    Food insecurity:     Worry: Not on file     Inability: Not on file    Transportation needs:     Medical: Not on file     Non-medical: Not on file   Tobacco Use    Smoking status: Never Smoker    Smokeless tobacco: Never Used   Substance and Sexual Activity    Alcohol use: Not Currently    Drug use: No    Sexual activity: Not Currently   Lifestyle    Physical activity:     Days per week: Not on file     Minutes per session: Not on file    Stress: Not on file   Relationships    Social connections:     Talks on phone: Not on file     Gets together: Not on file     Attends Restoration service: Not on file     Active member of club or organization: Not on file     Attends meetings of clubs or organizations: Not on file     Relationship status: Not on file    Intimate partner violence:     Fear of current or ex partner: Not on file     Emotionally abused: Not on file     Physically abused: Not on file     Forced sexual activity: Not on file   Other Topics Concern    Not on file   Social History Narrative    Not on file         Current Facility-Administered Medications:     acetaminophen (TYLENOL) tablet 650 mg, 650 mg, Oral, Q6H PRN, Sraah Hum, PA-C, 650 mg at 10/04/19 2301    alteplase (CATHFLO) injection 2 mg, 2 mg, Intracatheter, Once, Sarah Hum, PA-C, Stopped at 10/06/19 0732    ARIPiprazole (ABILIFY) tablet 10 mg, 10 mg, Oral, Daily, Emperatriz E Held, PA-C, 10 mg at 10/07/19 0837    benzonatate (TESSALON PERLES) capsule 200 mg, 200 mg, Oral, TID PRN, Emperatriz E Held, PA-C    ceFAZolin (ANCEF) IVPB (premix) 2,000 mg, 2,000 mg, Intravenous, Q8H, Emperatriz E Held, PA-C, Last Rate: 100 mL/hr at 10/07/19 0837, 2,000 mg at 10/07/19 0837    cholecalciferol (VITAMIN D3) tablet 1,000 Units, 1,000 Units, Oral, Daily, Emperatriz E Held, PA-C, 1,000 Units at 10/07/19 0837    iohexol (OMNIPAQUE) 240 MG/ML solution 50 mL, 50 mL, Oral, 90 min pre-procedure, Zo Burgos MD    LORazepam (ATIVAN) tablet 1 mg, 1 mg, Oral, Q6H PRN, Gabriella LIM BRITANY Rosas, 1 mg at 10/07/19 0319    pantoprazole (PROTONIX) EC tablet 40 mg, 40 mg, Oral, Early Morning, Emperatriz Rosas PA-C, 40 mg at 10/07/19 0525    saccharomyces boulardii (FLORASTOR) capsule 250 mg, 250 mg, Oral, BID, Sylwia Cobb PA-C, 250 mg at 10/07/19 0837    venlafaxine (EFFEXOR) tablet 75 mg, 75 mg, Oral, TID, Emperatriz Rosas PA-C, 75 mg at 10/07/19 1189    Medications Prior to Admission   Medication    ARIPiprazole (ABILIFY) 10 mg tablet    aspirin (ECOTRIN LOW STRENGTH) 81 mg EC tablet    benzonatate (TESSALON) 200 MG capsule    ceFAZolin (ANCEF) 2000 mg IVPB    cholecalciferol (VITAMIN D3) 1,000 units tablet    enoxaparin (LOVENOX) 100 mg/mL    Ferrous Sulfate (IRON) 28 MG TABS    gemfibrozil (LOPID) 600 mg tablet    LORazepam (ATIVAN) 1 mg tablet    mupirocin (BACTROBAN) 2 % ointment    MYRBETRIQ 50 MG TB24    omeprazole (PriLOSEC) 20 mg delayed release capsule    ondansetron (ZOFRAN) 8 mg tablet    polyethylene glycol (MIRALAX) 17 g packet    venlafaxine (EFFEXOR) 75 mg tablet    CRANIAL PROSTHESIS, RX,    sodium chloride, PF, 0 9 %       Allergies   Allergen Reactions    Cephalosporins Rash     Which Cephalosporin reaction was to not specified; however, has tolerated Amoxicillin, Cefazolin, and Cefepime         Physical Exam:    /83   Pulse 92   Temp 98 4 °F (36 9 °C)   Resp 20   Ht 4' 11" (1 499 m)   Wt 89 kg (196 lb 3 4 oz) Comment: 9/29  LMP  (LMP Unknown)   SpO2 94%   BMI 39 63 kg/m²     Physical Exam   Constitutional: She is oriented to person, place, and time  She appears well-developed and well-nourished  No distress  HENT:   Head: Normocephalic and atraumatic  Eyes: Conjunctivae are normal  No scleral icterus  Neck: Normal range of motion  Neck supple  Cardiovascular: Normal rate, regular rhythm and normal heart sounds  No murmur heard  Pulmonary/Chest: Effort normal and breath sounds normal  No respiratory distress  Abdominal: Soft   There is no tenderness  Musculoskeletal: She exhibits no edema or tenderness  Neurological: She is alert and oriented to person, place, and time  No cranial nerve deficit  Skin: Skin is warm and dry  Psychiatric: She has a normal mood and affect         Recent Results (from the past 48 hour(s))   Hemoglobin and hematocrit, blood    Collection Time: 10/05/19  3:53 PM   Result Value Ref Range    Hemoglobin 8 0 (L) 11 5 - 15 4 g/dL    Hematocrit 25 0 (L) 34 8 - 46 1 %   CBC and differential    Collection Time: 10/06/19  5:02 AM   Result Value Ref Range    WBC 6 77 4 31 - 10 16 Thousand/uL    RBC 2 75 (L) 3 81 - 5 12 Million/uL    Hemoglobin 7 7 (L) 11 5 - 15 4 g/dL    Hematocrit 24 4 (L) 34 8 - 46 1 %    MCV 89 82 - 98 fL    MCH 28 0 26 8 - 34 3 pg    MCHC 31 6 31 4 - 37 4 g/dL    RDW 17 2 (H) 11 6 - 15 1 %    MPV 9 3 8 9 - 12 7 fL    Platelets 223 (L) 236 - 390 Thousands/uL    nRBC 0 /100 WBCs    Neutrophils Relative 68 43 - 75 %    Immat GRANS % 3 (H) 0 - 2 %    Lymphocytes Relative 18 14 - 44 %    Monocytes Relative 10 4 - 12 %    Eosinophils Relative 1 0 - 6 %    Basophils Relative 0 0 - 1 %    Neutrophils Absolute 4 61 1 85 - 7 62 Thousands/µL    Immature Grans Absolute 0 17 0 00 - 0 20 Thousand/uL    Lymphocytes Absolute 1 22 0 60 - 4 47 Thousands/µL    Monocytes Absolute 0 68 0 17 - 1 22 Thousand/µL    Eosinophils Absolute 0 06 0 00 - 0 61 Thousand/µL    Basophils Absolute 0 03 0 00 - 0 10 Thousands/µL   Basic metabolic panel    Collection Time: 10/06/19  5:02 AM   Result Value Ref Range    Sodium 137 136 - 145 mmol/L    Potassium 4 2 3 5 - 5 3 mmol/L    Chloride 108 100 - 108 mmol/L    CO2 21 21 - 32 mmol/L    ANION GAP 8 4 - 13 mmol/L    BUN 24 5 - 25 mg/dL    Creatinine 0 81 0 60 - 1 30 mg/dL    Glucose 79 65 - 140 mg/dL    Calcium 8 4 8 3 - 10 1 mg/dL    eGFR 78 ml/min/1 73sq m   CBC and differential    Collection Time: 10/07/19  5:37 AM   Result Value Ref Range    WBC 6 52 4 31 - 10 16 Thousand/uL    RBC 2 71 (L) 3 81 - 5 12 Million/uL    Hemoglobin 7 6 (L) 11 5 - 15 4 g/dL    Hematocrit 24 0 (L) 34 8 - 46 1 %    MCV 89 82 - 98 fL    MCH 28 0 26 8 - 34 3 pg    MCHC 31 7 31 4 - 37 4 g/dL    RDW 17 0 (H) 11 6 - 15 1 %    MPV 10 5 8 9 - 12 7 fL    Platelets 146 (L) 164 - 390 Thousands/uL    nRBC 0 /100 WBCs    Neutrophils Relative 66 43 - 75 %    Immat GRANS % 2 0 - 2 %    Lymphocytes Relative 21 14 - 44 %    Monocytes Relative 10 4 - 12 %    Eosinophils Relative 1 0 - 6 %    Basophils Relative 0 0 - 1 %    Neutrophils Absolute 4 26 1 85 - 7 62 Thousands/µL    Immature Grans Absolute 0 13 0 00 - 0 20 Thousand/uL    Lymphocytes Absolute 1 37 0 60 - 4 47 Thousands/µL    Monocytes Absolute 0 67 0 17 - 1 22 Thousand/µL    Eosinophils Absolute 0 08 0 00 - 0 61 Thousand/µL    Basophils Absolute 0 01 0 00 - 0 10 Thousands/µL       Labs and pertinent reports reviewed  This note has been generated by voice recognition software system  Therefore, there may be spelling, grammar, and or syntax errors  Please contact if questions arise

## 2019-10-07 NOTE — PLAN OF CARE
Problem: Prexisting or High Potential for Compromised Skin Integrity  Goal: Skin integrity is maintained or improved  Description  INTERVENTIONS:  - Identify patients at risk for skin breakdown  - Assess and monitor skin integrity  - Assess and monitor nutrition and hydration status  - Monitor labs   - Assess for incontinence   - Turn and reposition patient  - Assist with mobility/ambulation  - Relieve pressure over bony prominences  - Avoid friction and shearing  - Provide appropriate hygiene as needed including keeping skin clean and dry  - Evaluate need for skin moisturizer/barrier cream  - Collaborate with interdisciplinary team   - Patient/family teaching  - Consider wound care consult   Outcome: Progressing     Problem: Potential for Falls  Goal: Patient will remain free of falls  Description  INTERVENTIONS:  - Assess patient frequently for physical needs  -  Identify cognitive and physical deficits and behaviors that affect risk of falls  -  [place] fall precautions as indicated by assessment   - Educate patient/family on patient safety including physical limitations  - Instruct patient to call for assistance with activity based on assessment  - Modify environment to reduce risk of injury  - Consider OT/PT consult to assist with strengthening/mobility  Outcome: Progressing     Problem: Nutrition/Hydration-ADULT  Goal: Nutrient/Hydration intake appropriate for improving, restoring or maintaining nutritional needs  Description  Monitor and assess patient's nutrition/hydration status for malnutrition  Collaborate with interdisciplinary team and initiate plan and interventions as ordered  Monitor patient's weight and dietary intake as ordered or per policy  Utilize nutrition screening tool and intervene as necessary  Determine patient's food preferences and provide high-protein, high-caloric foods as appropriate       INTERVENTIONS:  - Monitor oral intake, urinary output, labs, and treatment plans  - Assess nutrition and hydration status and recommend course of action  - Evaluate amount of meals eaten  - Assist patient with eating if necessary   - Allow adequate time for meals  - Recommend/ encourage appropriate diets, oral nutritional supplements, and vitamin/mineral supplements  - Order, calculate, and assess calorie counts as needed  - Recommend, monitor, and adjust tube feedings and TPN/PPN based on assessed needs  - Assess need for intravenous fluids  - Provide specific nutrition/hydration education as appropriate  - Include patient/family/caregiver in decisions related to nutrition  Outcome: Progressing     Problem: PAIN - ADULT  Goal: Verbalizes/displays adequate comfort level or baseline comfort level  Description  Interventions:  - Encourage patient to monitor pain and request assistance  - Assess pain using appropriate pain scale  - Administer analgesics based on type and severity of pain and evaluate response  - Implement non-pharmacological measures as appropriate and evaluate response  - Consider cultural and social influences on pain and pain management  - Notify physician/advanced practitioner if interventions unsuccessful or patient reports new pain  Outcome: Progressing     Problem: INFECTION - ADULT  Goal: Absence or prevention of progression during hospitalization  Description  INTERVENTIONS:  - Assess and monitor for signs and symptoms of infection  - Monitor lab/diagnostic results  - Monitor all insertion sites, i e  indwelling lines, tubes, and drains  - Monitor endotracheal if appropriate and nasal secretions for changes in amount and color  - [place] appropriate cooling/warming therapies per order  - Administer medications as ordered  - Instruct and encourage patient and family to use good hand hygiene technique  - Identify and instruct in appropriate isolation precautions for identified infection/condition  Outcome: Progressing     Problem: SAFETY ADULT  Goal: Maintain or return to baseline ADL function  Description  INTERVENTIONS:  -  Assess patient's ability to carry out ADLs; assess patient's baseline for ADL function and identify physical deficits which impact ability to perform ADLs (bathing, care of mouth/teeth, toileting, grooming, dressing, etc )  - Assess/evaluate cause of self-care deficits   - Assess range of motion  - Assess patient's mobility; develop plan if impaired  - Assess patient's need for assistive devices and provide as appropriate  - Encourage maximum independence but intervene and supervise when necessary  - Involve family in performance of ADLs  - Assess for home care needs following discharge   - Consider OT consult to assist with ADL evaluation and planning for discharge  - Provide patient education as appropriate  Outcome: Progressing  Goal: Maintain or return mobility status to optimal level  Description  INTERVENTIONS:  - Assess patient's baseline mobility status (ambulation, transfers, stairs, etc )    - Identify cognitive and physical deficits and behaviors that affect mobility  - Identify mobility aids required to assist with transfers and/or ambulation (gait belt, sit-to-stand, lift, walker, cane, etc )  - [place] fall precautions as indicated by assessment  - Record patient progress and toleration of activity level on Mobility SBAR; progress patient to next Phase/Stage  - Instruct patient to call for assistance with activity based on assessment  - Consider rehabilitation consult to assist with strengthening/weightbearing, etc   Outcome: Progressing     Problem: DISCHARGE PLANNING  Goal: Discharge to home or other facility with appropriate resources  Description  INTERVENTIONS:  - Identify barriers to discharge w/patient and caregiver  - Arrange for needed discharge resources and transportation as appropriate  - Identify discharge learning needs (meds, wound care, etc )  - Arrange for interpretive services to assist at discharge as needed  - Refer to Case Management Department for coordinating discharge planning if the patient needs post-hospital services based on physician/advanced practitioner order or complex needs related to functional status, cognitive ability, or social support system  Outcome: Progressing

## 2019-10-07 NOTE — PROGRESS NOTES
Progress Note - Payton Melendez 1955, 60 y o  female MRN: [id number]    Unit/Bed#: [facility] 910-01 Encounter: [id number]    Primary Care Provider: Joseph Muñoz DO   Date and time admitted to hospital: 9/29/2019 10:04 PM        Moderate protein-calorie malnutrition ([address] )  Assessment & Plan  Malnutrition Findings:   Malnutrition type: Chronic illness(in the context of nutrition impact symptoms from cancer and cancer txs and recent extended in pt admit resulting in wt loss and sub-optimal po intake)  Degree of Malnutrition: Malnutrition of moderate degree    BMI Findings: Body mass index is 39 63 kg/m²  Bilateral lower extremity edema  Assessment & Plan  · Pt reports to chronic b/l LE edema  Previously was on lasix but this was held by PCP given GI losses (was having diarrhea associated with chemo)  · Pt doesn't think she is ready for it yet  · Would recommend to elevate extremities, compression stockings     Swelling of left upper extremity  Assessment & Plan  · Pt has noted LUE pain and swelling since unsuccessful attempt at PICC placement in that arm  · Check LUE duplex - no evidence of acute or chronic DVT of left upper extremity  No evidence of superficial thrombophlebitis  There is evidence of a non-vascularized structure in the area of the bicep muscle  It did note acute thrombus in IJ vein on the right  ·  These results have been discussed with her previously and felt to be related to port infection  No further changes to the plan at this time    · xarelto was already changed to lovenox last admission  · PRN pain control   · Trial of compression sleeve for edema management     MSSA bacteremia  Assessment & Plan  · In the setting of port a cath and was admitted 9/19-9/29 with sepsis   · Catheter removed on 9/20 now has R PICC placed 9/27  · Repeat cultures have been negative for > 5 days   · TRINH without vegetation  · ID recommended 6 weeks of abx - on day #18 of abx, Cefazolin day #16, tentative end date 11/4/19    Anemia  Assessment & Plan  · Hb 6 7 s/p 1 unit of PRBC - 8 2 - 7 5  · When 7 5 & 7 5 repeated 1  uPRBC -> 9 2 -> 8 2 -  8  Stool occult neg  · No active bleed, brief blood in PCN at night after exchange yesterday, notice of hematuria by night nurses per urethra - consulted urology but doesn't seem an active issue  · Received transfusion last week as well  · Had EGD on 9/24 without evidence of source of blood loss  · lovenox & aspirin restarted today  · Hb going down slowly  · CT abdomen performed again & neg  · Consulted hematology - they think it is just multifactorial, transfuse prn, give her time  · After restarting lovenox check hb in am - if < 8 then transfuse her   If not below 7 then can discharge after transfusion    Acute deep vein thrombosis (DVT) of proximal vein of lower extremity ([address] )  Assessment & Plan  · GYN Onc team switched from Eliquis to Lovenox BID permanently - held now d/t anemia but restarted today per Hemonc  · Consider repeat US as outpatient in 2 weeks or so    Endometrial cancer [facility])  Assessment & Plan  · Appreciate Gynecologic Oncology evaluation on recent admission   · She received Taxol/carboplatin and received last chemotherapy 09/09/2019  · Received granix 08/31/2019, also Neulasta 09/09/2019 and 09/16/2019  · Chemotherapy to be on hold for now given acute infection  · CT CAP - shows some improvement in areas of malignancy, R>L pleural effusion, nodules in lung which can be infectious/inflammatory       Benign essential hypertension  Assessment & Plan  · BP acceptbale, monitor routinely   · Continue home medical management     * Generalized weakness  Assessment & Plan  · Discharged from \[facility]\"" 9/29 with recommended home PT 3-5x/week however represented to [facility] ED after being unable to lift her right leg to go up 2 stairs at home   · Transferred to \[facility]\"" for continuity of care   · Likely 2/2 physical decondition in the setting of recurrent endometrial cancer   · Monitor hemoglobin and transfuse prn   · Fall precautions  · PT/OT consults placed - recommending rehab   · Patient had refused rehab on prior admission but is now agreeable, case management following        [facility] Internal Medicine Progress Note  Patient: Deep Anguiano 60 y o  female   MRN: [id number]  PCP: Ronni Ervin DO  Unit/Bed#: [facility] 910-01 Encounter: [id number]  Date Of Visit: 10/07/19    Assessment:    Principal Problem:    Generalized weakness  Active Problems:    Benign essential hypertension    Endometrial cancer ([address] )    Acute deep vein thrombosis (DVT) of proximal vein of lower extremity (HCC)    Anemia    MSSA bacteremia    Swelling of left upper extremity    Bilateral lower extremity edema    Moderate protein-calorie malnutrition (HCC)      VTE Pharmacologic Prophylaxis:   Pharmacologic: Enoxaparin (Lovenox)  Mechanical VTE Prophylaxis in Place: Yes    Patient Centered Rounds: I have performed bedside rounds with nursing staff today  Discussions with Specialists or Other Care Team Provider: hem    Education and Discussions with Family / Patient: patient    Time Spent for Care: 30 minutes  More than 50% of total time spent on counseling and coordination of care as described above  Current Length of Stay: 5 day(s)    Current Patient Status: Inpatient   Certification Statement: The patient will continue to require additional inpatient hospital stay due to monitor hb    Discharge Plan / Estimated Discharge Date: tomorrow if hb above 7    Code Status: Level 1 - Full Code      Subjective:   No new complains    Objective:     Vitals:   Temp (24hrs), Av 6 °F (37 °C), Min:98 3 °F (36 8 °C), Max:99 °F (37 2 °C)    Temp:  [98 3 °F (36 8 °C)-99 °F (37 2 °C)] 98 3 °F (36 8 °C)  HR:  [86-93] 93  Resp:  [18-20] 20  BP: (151-155)/(82-84) 152/84  SpO2:  [94 %-97 %] 97 %  Body mass index is 39 63 kg/m²  Input and Output Summary (last 24 hours):        Intake/Output Summary (Last 24 hours) at 10/7/2019 1710  Last data filed at 10/7/2019 1612  Gross per 24 hour   Intake 770 ml   Output 2575 ml   Net -1805 ml       Physical Exam:     Physical Exam   Constitutional: She is oriented to person, place, and time  She appears well-developed and well-nourished  HENT:   Head: Normocephalic and atraumatic  Eyes: Conjunctivae are normal    Cardiovascular: Normal rate and regular rhythm  Exam reveals no friction rub  No murmur heard  Pulmonary/Chest: Effort normal and breath sounds normal  No stridor  No respiratory distress  Abdominal: Soft  She exhibits no distension  There is no tenderness  Musculoskeletal: She exhibits no tenderness  Neurological: She is alert and oriented to person, place, and time  Vitals reviewed  Additional Data:     Labs:    Results from last 7 days   Lab Units 10/07/19  0537   WBC Thousand/uL 6 52   HEMOGLOBIN g/dL 7 6*   HEMATOCRIT % 24 0*   PLATELETS Thousands/uL 128*   NEUTROS PCT % 66   LYMPHS PCT % 21   MONOS PCT % 10   EOS PCT % 1     Results from last 7 days   Lab Units 10/06/19  0502   POTASSIUM mmol/L 4 2   CHLORIDE mmol/L 108   CO2 mmol/L 21   BUN mg/dL 24   CREATININE mg/dL 0 81   CALCIUM mg/dL 8 4           * I Have Reviewed All Lab Data Listed Above  * Additional Pertinent Lab Tests Reviewed:  All Labs Within Last 24 Hours Reviewed    Imaging:    Imaging Reports Reviewed Today Include:   Imaging Personally Reviewed by Myself Includes:      Recent Cultures (last 7 days):           Last 24 Hours Medication List:     Current Facility-Administered Medications:  acetaminophen 650 mg Oral Q6H PRN Leila Ahn PA-C    alteplase 2 mg Intracatheter Once Leila Ahn PA-C    ARIPiprazole 10 mg Oral Daily Emperatriz E BRITANY Rosas    aspirin 81 mg Oral Daily Zo Burgos MD    benzonatate 200 mg Oral TID PRN Emperatriz E BRITANY Rosas    ceFAZolin 2,000 mg Intravenous Q8H Emperatriz Rosas PA-C Last Rate: 2,000 mg (10/07/19 3540)   cholecalciferol 1,000 Units Oral Daily Emperatriz E Held, BRITANY    enoxaparin 1 mg/kg Subcutaneous Q12H [address] Zo Burgos MD    iohexol 50 mL Oral 90 min pre-procedure Casie Arenas MD    LORazepam 1 mg Oral Q6H PRN Emperatriz E Held, BRITANY    pantoprazole 40 mg Oral Early Morning Emperatriz E Held, BRITANY    saccharomyces boulardii 250 mg Oral BID Sylwia Cobb PA-C    venlafaxine 75 mg Oral TID Darlene Rosas, BRITANY         Today, Patient Was Seen By: Casie Arenas MD    ** Please Note: This note has been constructed using a voice recognition system   **

## 2019-10-07 NOTE — OCCUPATIONAL THERAPY NOTE
[address] Progress Note     Patient Name: Joey Sherwood  Today's Date: 10/7/2019  Problem List  Principal Problem:    Generalized weakness  Active Problems:    Benign essential hypertension    Endometrial cancer [address] [address] )    Acute deep vein thrombosis (DVT) of proximal vein of lower extremity (HCC)    Anemia    MSSA bacteremia    Swelling of left upper extremity    Bilateral lower extremity edema    Moderate protein-calorie malnutrition ([facility] [address] )          10/07/19 1455   Restrictions/Precautions   Weight Bearing Precautions Per Order No   Other Precautions Fall Risk;Bed Alarm   General   Response to Previous Treatment Patient with no complaints from previous session   Pain Assessment   Pain Assessment No/denies pain   Pain Score No Pain   ADL   UB Dressing Assistance 4  Minimal Assistance   UB Dressing Deficit Steadying;Supervision/safety; Increased time to complete;Pull around back  (hospital gown, seated)   LB Dressing Assistance 3  Moderate Assistance   LB Dressing Deficit Increased time to complete;Steadying;Supervision/safety; Don/doff R sock; Don/doff L sock   Bed Mobility   Supine to Sit 3  Moderate assistance   Additional items Assist x 1;HOB elevated; Increased time required;LE management   Sit to Supine 3  Moderate assistance   Additional items Assist x 1; Increased time required;LE management   Transfers   Sit to Stand 4  Minimal assistance   Additional items Assist x 1; Increased time required;Verbal cues   Stand to Sit 4  Minimal assistance   Additional items Assist x 1; Increased time required;Verbal cues   Additional Comments RW   Functional Mobility   Functional Mobility 5  Supervision   Additional Comments short household distances   Additional items Rolling walker   Therapeutic Exercise - ROM   UE-ROM Yes   ROM- Right Upper Extremities   R Shoulder AROM; Flexion   R Elbow AROM;Elbow flexion;Elbow extension   R Hand   ( strengthening; stress ball)   R Weight/Reps/Sets 10 reps each   ROM - Left Upper Extremities    L Shoulder AROM; Flexion   L Elbow AROM;Elbow flexion;Elbow extension   L Hand   ( strengthening; stress ball)   L Weight/Reps/Sets 10 reps each   Cognition   Overall Cognitive Status WFL   Arousal/Participation Alert; Cooperative   Attention Within functional limits   Orientation Level Oriented X4   Memory Within functional limits   Following Commands Follows one step commands without difficulty   Activity Tolerance   Activity Tolerance Patient limited by fatigue   Medical Staff Made Aware Spoke to RN - pt appropriate to be seen   Assessment   Assessment Patient participated in Skilled OT session this date with interventions consisting of ADL re-training, UE strengthening, functional transfers/mobility  Patient agreeable to OT treatment session, upon arrival patient was found supine in bed  Pt currently limited by decreased activity tolerance/fatigue/weakness  Patient requiring occasional seated rest breaks 2' fatigue  Pt requiring MIN A for functional transfers and SUP for short distance household functional mobility  Pt requiring MOD A to don/doff socks  Pt provided with squeeze ball for  strengthening and participated in UE there-ex with Good understanding; pt encouraged to participate as HEP and to continue functional use of UE during ADL tasks  Patient continues to be functioning below baseline level, occupational performance remains limited secondary to factors listed above and increased risk for falls and injury  From OT standpoint, recommendation at time of d/c would be Short Term Rehab  Patient to benefit from continued Occupational Therapy treatment while in the hospital to address deficits as defined above and maximize level of functional independence with ADLs and functional mobility  Plan   Treatment Interventions ADL retraining;Functional transfer training;UE strengthening/ROM; Endurance training;Patient/family training; Activityengagement   Goal Expiration Date 10/14/19   OT Treatment Day 3   OT Frequency 3-5x/wk   Recommendation   OT Discharge Recommendation Short Term Rehab         Kim Wade, OT

## 2019-10-07 NOTE — UTILIZATION REVIEW
Continued Stay Review    Date: 10/7/19                       Current Patient Class: INPATIENT  Current Level of Care: MED SURG  HPI:64 y o  female initially admitted on 10/2/19 FOR BACTEREMIA  Assessment/Plan:   IVABT THERAPY IN PROGRESS FOR MSSA BACTEREMIA 2ND INFECTED PORT-A-CATH, REMOVED 9/22  PERSISTENT ANEMIA WITH HGB @ 7 6 S/P TRANSFUSIONS  HEMATOLOGY CONSULTED, CT A/P WITH NO ETIOLOGY FOR SYMPTOMS, GI FOLLOWING  GYN ONCOLOGY: TX ON HOLD FOR ENDOMETRIAL CANCER UNTIL CURRENT MEDICAL ISSUES ADDRESSED  Pertinent Labs/Diagnostic Results:   Results from last 7 days   Lab Units 10/07/19  0537 10/06/19  0502 10/05/19  1553 10/05/19  0521 10/04/19  1548 10/04/19  0607   WBC Thousand/uL 6 52 6 77  --  7 91  --  8 96   HEMOGLOBIN g/dL 7 6* 7 7* 8 0* 8 0* 8 2* 9 2*   HEMATOCRIT % 24 0* 24 4* 25 0* 24 8* 25 3* 28 3*   PLATELETS Thousands/uL 128* 116*  --  122*  --  144*   NEUTROS ABS Thousands/µL 4 26 4 61  --  5 54  --   --    BANDS PCT %  --   --   --   --   --  1     Results from last 7 days   Lab Units 10/07/19  0537   RETIC CT ABS  62,400   RETIC CT PCT % 2 26*     Results from last 7 days   Lab Units 10/06/19  0502 10/02/19  0545 10/01/19  0508   SODIUM mmol/L 137 134* 132*   POTASSIUM mmol/L 4 2 4 4 5 0   CHLORIDE mmol/L 108 105 105   CO2 mmol/L 21 18* 17*   ANION GAP mmol/L 8 11 10   BUN mg/dL 24 22 24   CREATININE mg/dL 0 81 0 96 0 88   EGFR ml/min/1 73sq m 78 64 71   CALCIUM mg/dL 8 4 8 5 8 7     Results from last 7 days   Lab Units 10/06/19  0502 10/02/19  0545 10/01/19  0508   GLUCOSE RANDOM mg/dL 79 73 64*     Results from last 7 days   Lab Units 10/06/19  0502   TSH 3RD GENERATON uIU/mL 1 710     Results from last 7 days   Lab Units 10/02/19  0545   FERRITIN ng/mL 1,042*     CT A/P 10/6=  1  No CT explanation for patient's symptoms  2   New 4 mm right lower lobe nodule  3   Stable right external iliac lymphadenopathy  4   Right percutaneous nephroureteral catheter in place    No hydronephrosis  Vital Signs: /83   Pulse 92   Temp 98 4 °F (36 9 °C)   Resp 20   Ht 4' 11" (1 499 m)   Wt 89 kg (196 lb 3 4 oz) Comment: 9/29  LMP  (LMP Unknown)   SpO2 94%   BMI 39 63 kg/m²   Medications:   acetaminophen 650 mg Oral Q6H PRN   alteplase 2 mg Intracatheter Once   ARIPiprazole 10 mg Oral Daily   benzonatate 200 mg Oral TID PRN   ceFAZolin 2,000 mg Intravenous Q8H   cholecalciferol 1,000 Units Oral Daily   iohexol 50 mL Oral 90 min pre-procedure   LORazepam 1 mg Oral Q6H PRN   pantoprazole 40 mg Oral Early Morning   saccharomyces boulardii 250 mg Oral BID   venlafaxine 75 mg Oral TID     Discharge Plan: TBD  Network Utilization Review Department  Phone: [phone]; Fax [phone]  [email or web address]  ATTENTION: Please call with any questions or concerns to [phone]  and carefully listen to the prompts so that you are directed to the right person  Send all requests for admission clinical reviews, approved or denied determinations and any other requests to fax [phone]   All voicemails are confidential

## 2019-10-08 ENCOUNTER — TELEPHONE (OUTPATIENT)
Dept: OTHER | Facility: OTHER | Age: 64
End: 2019-10-08

## 2019-10-08 VITALS
RESPIRATION RATE: 18 BRPM | BODY MASS INDEX: 39.56 KG/M2 | HEIGHT: 59 IN | SYSTOLIC BLOOD PRESSURE: 159 MMHG | TEMPERATURE: 97.4 F | WEIGHT: 196.21 LBS | DIASTOLIC BLOOD PRESSURE: 80 MMHG | HEART RATE: 92 BPM | OXYGEN SATURATION: 97 %

## 2019-10-08 LAB
ABO GROUP BLD: NORMAL
BASOPHILS # BLD AUTO: 0.03 THOUSANDS/ΜL (ref 0–0.1)
BASOPHILS NFR BLD AUTO: 1 % (ref 0–1)
BLD GP AB SCN SERPL QL: NEGATIVE
EOSINOPHIL # BLD AUTO: 0.11 THOUSAND/ΜL (ref 0–0.61)
EOSINOPHIL NFR BLD AUTO: 2 % (ref 0–6)
ERYTHROCYTE [DISTWIDTH] IN BLOOD BY AUTOMATED COUNT: 16.9 % (ref 11.6–15.1)
HCT VFR BLD AUTO: 23 % (ref 34.8–46.1)
HGB BLD-MCNC: 7.2 G/DL (ref 11.5–15.4)
IMM GRANULOCYTES # BLD AUTO: 0.11 THOUSAND/UL (ref 0–0.2)
IMM GRANULOCYTES NFR BLD AUTO: 2 % (ref 0–2)
LYMPHOCYTES # BLD AUTO: 1.73 THOUSANDS/ΜL (ref 0.6–4.47)
LYMPHOCYTES NFR BLD AUTO: 27 % (ref 14–44)
MCH RBC QN AUTO: 28.3 PG (ref 26.8–34.3)
MCHC RBC AUTO-ENTMCNC: 31.3 G/DL (ref 31.4–37.4)
MCV RBC AUTO: 91 FL (ref 82–98)
MONOCYTES # BLD AUTO: 0.59 THOUSAND/ΜL (ref 0.17–1.22)
MONOCYTES NFR BLD AUTO: 9 % (ref 4–12)
NEUTROPHILS # BLD AUTO: 3.76 THOUSANDS/ΜL (ref 1.85–7.62)
NEUTS SEG NFR BLD AUTO: 59 % (ref 43–75)
NRBC BLD AUTO-RTO: 0 /100 WBCS
PLATELET # BLD AUTO: 127 THOUSANDS/UL (ref 149–390)
PMV BLD AUTO: 9.8 FL (ref 8.9–12.7)
RBC # BLD AUTO: 2.54 MILLION/UL (ref 3.81–5.12)
RH BLD: POSITIVE
SPECIMEN EXPIRATION DATE: NORMAL
WBC # BLD AUTO: 6.33 THOUSAND/UL (ref 4.31–10.16)

## 2019-10-08 PROCEDURE — 85025 COMPLETE CBC W/AUTO DIFF WBC: CPT | Performed by: HOSPITALIST

## 2019-10-08 PROCEDURE — 97530 THERAPEUTIC ACTIVITIES: CPT

## 2019-10-08 PROCEDURE — 86900 BLOOD TYPING SEROLOGIC ABO: CPT | Performed by: INTERNAL MEDICINE

## 2019-10-08 PROCEDURE — 86923 COMPATIBILITY TEST ELECTRIC: CPT

## 2019-10-08 PROCEDURE — 99232 SBSQ HOSP IP/OBS MODERATE 35: CPT | Performed by: OBSTETRICS & GYNECOLOGY

## 2019-10-08 PROCEDURE — P9040 RBC LEUKOREDUCED IRRADIATED: HCPCS

## 2019-10-08 PROCEDURE — 86850 RBC ANTIBODY SCREEN: CPT | Performed by: INTERNAL MEDICINE

## 2019-10-08 PROCEDURE — 86901 BLOOD TYPING SEROLOGIC RH(D): CPT | Performed by: INTERNAL MEDICINE

## 2019-10-08 PROCEDURE — 99232 SBSQ HOSP IP/OBS MODERATE 35: CPT | Performed by: INTERNAL MEDICINE

## 2019-10-08 PROCEDURE — 99239 HOSP IP/OBS DSCHRG MGMT >30: CPT | Performed by: INTERNAL MEDICINE

## 2019-10-08 RX ORDER — LORAZEPAM 1 MG/1
1 TABLET ORAL EVERY 6 HOURS PRN
Qty: 10 TABLET | Refills: 0 | Status: SHIPPED | OUTPATIENT
Start: 2019-10-08 | End: 2020-01-07

## 2019-10-08 RX ORDER — LISINOPRIL 5 MG/1
5 TABLET ORAL DAILY
Refills: 0 | Status: SHIPPED | OUTPATIENT
Start: 2019-10-09 | End: 2019-11-19

## 2019-10-08 RX ORDER — LISINOPRIL 5 MG/1
5 TABLET ORAL DAILY
Status: DISCONTINUED | OUTPATIENT
Start: 2019-10-08 | End: 2019-10-08 | Stop reason: HOSPADM

## 2019-10-08 RX ORDER — ACETAMINOPHEN 325 MG/1
650 TABLET ORAL EVERY 6 HOURS PRN
Qty: 30 TABLET | Refills: 0 | Status: SHIPPED | OUTPATIENT
Start: 2019-10-08 | End: 2021-11-19

## 2019-10-08 RX ORDER — SACCHAROMYCES BOULARDII 250 MG
250 CAPSULE ORAL 2 TIMES DAILY
Refills: 0 | Status: SHIPPED | OUTPATIENT
Start: 2019-10-08

## 2019-10-08 RX ORDER — FOLIC ACID 1 MG/1
1 TABLET ORAL DAILY
Refills: 0 | Status: SHIPPED | OUTPATIENT
Start: 2019-10-08

## 2019-10-08 RX ORDER — FUROSEMIDE 10 MG/ML
20 INJECTION INTRAMUSCULAR; INTRAVENOUS ONCE
Status: COMPLETED | OUTPATIENT
Start: 2019-10-08 | End: 2019-10-08

## 2019-10-08 RX ADMIN — PANTOPRAZOLE SODIUM 40 MG: 40 TABLET, DELAYED RELEASE ORAL at 05:10

## 2019-10-08 RX ADMIN — Medication 250 MG: at 09:01

## 2019-10-08 RX ADMIN — CEFAZOLIN SODIUM 2000 MG: 2 SOLUTION INTRAVENOUS at 07:31

## 2019-10-08 RX ADMIN — LISINOPRIL 5 MG: 5 TABLET ORAL at 09:02

## 2019-10-08 RX ADMIN — ASPIRIN 81 MG: 81 TABLET, COATED ORAL at 09:01

## 2019-10-08 RX ADMIN — VENLAFAXINE 75 MG: 37.5 TABLET ORAL at 09:01

## 2019-10-08 RX ADMIN — LORAZEPAM 1 MG: 1 TABLET ORAL at 14:02

## 2019-10-08 RX ADMIN — FUROSEMIDE 20 MG: 10 INJECTION, SOLUTION INTRAMUSCULAR; INTRAVENOUS at 13:32

## 2019-10-08 RX ADMIN — CEFAZOLIN SODIUM 2000 MG: 2 SOLUTION INTRAVENOUS at 15:12

## 2019-10-08 RX ADMIN — ENOXAPARIN SODIUM 90 MG: 100 INJECTION SUBCUTANEOUS at 09:07

## 2019-10-08 RX ADMIN — ARIPIPRAZOLE 10 MG: 10 TABLET ORAL at 09:00

## 2019-10-08 RX ADMIN — VENLAFAXINE 75 MG: 37.5 TABLET ORAL at 15:35

## 2019-10-08 RX ADMIN — MELATONIN 1000 UNITS: at 09:06

## 2019-10-08 NOTE — PLAN OF CARE
Problem: Prexisting or High Potential for Compromised Skin Integrity  Goal: Skin integrity is maintained or improved  Description  INTERVENTIONS:  - Identify patients at risk for skin breakdown  - Assess and monitor skin integrity  - Assess and monitor nutrition and hydration status  - Monitor labs   - Assess for incontinence   - Turn and reposition patient  - Assist with mobility/ambulation  - Relieve pressure over bony prominences  - Avoid friction and shearing  - Provide appropriate hygiene as needed including keeping skin clean and dry  - Evaluate need for skin moisturizer/barrier cream  - Collaborate with interdisciplinary team   - Patient/family teaching  - Consider wound care consult   Outcome: Progressing     Problem: Potential for Falls  Goal: Patient will remain free of falls  Description  INTERVENTIONS:  - Assess patient frequently for physical needs  -  Identify cognitive and physical deficits and behaviors that affect risk of falls  -  [place] fall precautions as indicated by assessment   - Educate patient/family on patient safety including physical limitations  - Instruct patient to call for assistance with activity based on assessment  - Modify environment to reduce risk of injury  - Consider OT/PT consult to assist with strengthening/mobility  Outcome: Progressing     Problem: Nutrition/Hydration-ADULT  Goal: Nutrient/Hydration intake appropriate for improving, restoring or maintaining nutritional needs  Description  Monitor and assess patient's nutrition/hydration status for malnutrition  Collaborate with interdisciplinary team and initiate plan and interventions as ordered  Monitor patient's weight and dietary intake as ordered or per policy  Utilize nutrition screening tool and intervene as necessary  Determine patient's food preferences and provide high-protein, high-caloric foods as appropriate       INTERVENTIONS:  - Monitor oral intake, urinary output, labs, and treatment plans  - Assess nutrition and hydration status and recommend course of action  - Evaluate amount of meals eaten  - Assist patient with eating if necessary   - Allow adequate time for meals  - Recommend/ encourage appropriate diets, oral nutritional supplements, and vitamin/mineral supplements  - Order, calculate, and assess calorie counts as needed  - Recommend, monitor, and adjust tube feedings and TPN/PPN based on assessed needs  - Assess need for intravenous fluids  - Provide specific nutrition/hydration education as appropriate  - Include patient/family/caregiver in decisions related to nutrition  Outcome: Progressing     Problem: PAIN - ADULT  Goal: Verbalizes/displays adequate comfort level or baseline comfort level  Description  Interventions:  - Encourage patient to monitor pain and request assistance  - Assess pain using appropriate pain scale  - Administer analgesics based on type and severity of pain and evaluate response  - Implement non-pharmacological measures as appropriate and evaluate response  - Consider cultural and social influences on pain and pain management  - Notify physician/advanced practitioner if interventions unsuccessful or patient reports new pain  Outcome: Progressing     Problem: INFECTION - ADULT  Goal: Absence or prevention of progression during hospitalization  Description  INTERVENTIONS:  - Assess and monitor for signs and symptoms of infection  - Monitor lab/diagnostic results  - Monitor all insertion sites, i e  indwelling lines, tubes, and drains  - Monitor endotracheal if appropriate and nasal secretions for changes in amount and color  - [place] appropriate cooling/warming therapies per order  - Administer medications as ordered  - Instruct and encourage patient and family to use good hand hygiene technique  - Identify and instruct in appropriate isolation precautions for identified infection/condition  Outcome: Progressing     Problem: SAFETY ADULT  Goal: Maintain or return to baseline ADL function  Description  INTERVENTIONS:  -  Assess patient's ability to carry out ADLs; assess patient's baseline for ADL function and identify physical deficits which impact ability to perform ADLs (bathing, care of mouth/teeth, toileting, grooming, dressing, etc )  - Assess/evaluate cause of self-care deficits   - Assess range of motion  - Assess patient's mobility; develop plan if impaired  - Assess patient's need for assistive devices and provide as appropriate  - Encourage maximum independence but intervene and supervise when necessary  - Involve family in performance of ADLs  - Assess for home care needs following discharge   - Consider OT consult to assist with ADL evaluation and planning for discharge  - Provide patient education as appropriate  Outcome: Progressing  Goal: Maintain or return mobility status to optimal level  Description  INTERVENTIONS:  - Assess patient's baseline mobility status (ambulation, transfers, stairs, etc )    - Identify cognitive and physical deficits and behaviors that affect mobility  - Identify mobility aids required to assist with transfers and/or ambulation (gait belt, sit-to-stand, lift, walker, cane, etc )  - [place] fall precautions as indicated by assessment  - Record patient progress and toleration of activity level on Mobility SBAR; progress patient to next Phase/Stage  - Instruct patient to call for assistance with activity based on assessment  - Consider rehabilitation consult to assist with strengthening/weightbearing, etc   Outcome: Progressing     Problem: DISCHARGE PLANNING  Goal: Discharge to home or other facility with appropriate resources  Description  INTERVENTIONS:  - Identify barriers to discharge w/patient and caregiver  - Arrange for needed discharge resources and transportation as appropriate  - Identify discharge learning needs (meds, wound care, etc )  - Arrange for interpretive services to assist at discharge as needed  - Refer to Case Management Department for coordinating discharge planning if the patient needs post-hospital services based on physician/advanced practitioner order or complex needs related to functional status, cognitive ability, or social support system  Outcome: Progressing

## 2019-10-08 NOTE — TELEPHONE ENCOUNTER
Sent this Message to Dr Lebron Mcdowell via [employer] connect @ [phone]/ Nick Rodrigues from [place]/ Pt   [address]  60-/ New Admit     Told Martha Haider to call back in 20-30 minutes if no call back from Dr Lebron Mcdowell

## 2019-10-08 NOTE — SOCIAL WORK
PT for disch today  Aware of cost for w/c transport and agrees    [employer], can transport 5PM today    [place] notified via Jeffry Tyler, [facility] liaison, Bubba Rodrigues was obtained for admission

## 2019-10-08 NOTE — PLAN OF CARE
Problem: PHYSICAL THERAPY ADULT  Goal: Performs mobility at highest level of function for planned discharge setting  See evaluation for individualized goals  Description  Treatment/Interventions: Functional transfer training, LE strengthening/ROM, Elevations, Therapeutic exercise, Endurance training, Patient/family training, Equipment eval/education, Gait training, Spoke to nursing  Equipment Recommended: Luisa Pina       See flowsheet documentation for full assessment, interventions and recommendations  Outcome: Progressing  Note:   Prognosis: Good  Problem List: Decreased strength, Decreased endurance, Impaired balance, Decreased mobility  Assessment: Pt presents with decreased endurance, strength, and mobility  Pt demonstrated ability to perform STS at supervision  Pt ambulated 27 ft X2 with RW at supervision  Pt ascended/descended 6 steps with one HR and step to pattern at [address]  Pt continues to have decreased endurance/activity tolerance  Pt fatigues quickly with ambulation and stair training  Pt continues to benefit from skilled therapy to maximize functional independence  Recommendation at this time is rehab  Pt would benefit from continued ambulation, functional transfers, stairs, and improving endurance/activity tolerance  Barriers to Discharge: Inaccessible home environment     Recommendation: (rehab)     PT - OK to Discharge: (when medically stable to rehab)    See flowsheet documentation for full assessment

## 2019-10-08 NOTE — PHYSICAL THERAPY NOTE
PHYSICAL THERAPY NOTE      Patient Name: Bernardino Hernandez  AEXPP'H Date: 10/8/2019     10/08/19 0848   Pain Assessment   Pain Assessment No/denies pain   Pain Score No Pain   Restrictions/Precautions   Weight Bearing Precautions Per Order No   Other Precautions Chair Alarm; Fall Risk   General   Chart Reviewed Yes   Response to Previous Treatment Patient with no complaints from previous session  Family/Caregiver Present No   Cognition   Overall Cognitive Status WFL   Arousal/Participation Alert;Arousable   Attention Within functional limits   Orientation Level Oriented X4   Memory Within functional limits   Following Commands Follows one step commands without difficulty   Subjective   Subjective "Hopefully I get to go home tomorrow"   Bed Mobility   Additional Comments pt OOB upon arrival and returned to chair at end of session with all needs within reach   Transfers   Sit to Stand 5  Supervision   Additional items Increased time required   Stand to Sit 5  Supervision   Additional items Increased time required   Ambulation/Elevation   Gait pattern Excessively slow; Short stride   Gait Assistance 5  Supervision   Additional items Assist x 1;Verbal cues   Assistive Device Rolling walker   Distance 30ftX2   Stair Management Assistance 4  Minimal assist   Additional items Assist x 1;Verbal cues   Stair Management Technique One rail R;Step to pattern; Foreward   Number of Stairs 6   Balance   Static Sitting Good   Static Standing Fair   Ambulatory Fair -   Endurance Deficit   Endurance Deficit Yes   Endurance Deficit Description fatigue, weakness   Activity Tolerance   Activity Tolerance Patient limited by fatigue   Medical Staff Made Aware CM   Nurse Made Aware RN cleared pt for therapy   Assessment   Prognosis Good   Problem List Decreased strength;Decreased endurance; Impaired balance;Decreased mobility   Assessment Pt presents with decreased endurance, strength, and mobility  Pt demonstrated ability to perform STS at supervision  Pt ambulated 27 ft X2 with RW at supervision  Pt ascended/descended 6 steps with one HR and step to pattern at [address]  Pt continues to have decreased endurance/activity tolerance  Pt fatigues quickly with ambulation and stair training  Pt continues to benefit from skilled therapy to maximize functional independence  Recommendation at this time is rehab  Pt would benefit from continued ambulation, functional transfers, stairs, and improving endurance/activity tolerance  Barriers to Discharge Inaccessible home environment   Goals   Patient Goals to go home   STG Expiration Date 10/09/19   PT Treatment Day 3   Plan   Treatment/Interventions Functional transfer training;LE strengthening/ROM; Elevations; Therapeutic exercise; Endurance training;Patient/family training;Equipment eval/education;Gait training;Spoke to nursing   Progress Slow progress, decreased activity tolerance   PT Frequency   (3-5x/week)   Recommendation   Recommendation   (rehab)   Equipment Recommended Kaylin Lemon   PT - OK to Discharge   (when medically stable to rehab)     Ora Arms, PT, DPT

## 2019-10-08 NOTE — INCIDENTAL FINDINGS
The following findings require follow up:  Radiographic finding   Finding:  Lung nodule   Follow up required:  Chest CT scan   Follow up should be done within 3 month(s)    Please notify the following clinician to assist with the follow up:   Dr Ernst Blackburn DO

## 2019-10-08 NOTE — PROGRESS NOTES
Gyn Oncology Progress note   Joseluis Melendez 60 y o  female MRN: [id number]  Unit/Bed#: [facility] 910-01 Encounter: [id number]    Assessment: 60 y o  recurrent platinum refractory stage IB endometrial cancer, ongoing inpatient management in setting of recent bacteremia from [facility] A catheter, persistent anemia refractory to transfusions, readmission due to deconditioning     Plan:    1) Endometrial cancer  - Carboplatin/the Taxol with last treatment on 9/16/19  - Status post chronic aches on 8/31/for chemo induced leukopenia, status post Granix, Neulasta  - Holding chemotherapy, hold treatment current    2) Persistent anemia  - Persistent hemoglobin dropping despite multiple transfusions, no obvious source of active bleeding  Patient risks for anemia; status post chemotherapy with carbo/taxol, status post gastric bypass-malabsorption, endometrial cancer, status post sepsis  - Recommend transfusion had to hemoglobin of>8  - Urology and hematology consulted  - CT scan does not provide information for explanation of anemia  - Lovenox 90mg BID, aspirin 81mg re-initiated after Hematology consultation  - Hemoglobin trend 8 0->7 7->7 6->7 2 today  - Gastroenterology saw the patient at previous admission, may re-consult    3) MSSA bacteremia  - is setting of Port A catheter which removed on 9/20  - ID recommendations 6 weeks of antibiotics, cefazolin day #19      Subjective: Joey Sherwood has no acute issues overnight  Pain is well controlled with Tylenol-no pain medication needed Yesterday  Patient is currently voiding- she has nephrostomy tube  She is ambulating  Patient is currently passing flatus and has had no bowel movement  She is tolerating PO, and denies nausea or vomitting  Patient denies fever, chills, chest pain, shortness of breath, or calf tenderness       /84   Pulse 89   Temp 98 8 °F (37 1 °C)   Resp 18   Ht 4' 11" (1 499 m)   Wt 89 kg (196 lb 3 4 oz) Comment: 9/29  LMP  (LMP Unknown) SpO2 90%   BMI 39 63 kg/m²     I/O last 3 completed shifts: In: 56 [P O :840; IV Piggyback:50]  Out: 2315 [Urine:3650]  I/O this shift: In: 0   Out: 1100 [Urine:1100]    Lab Results   Component Value Date    WBC 6 33 10/08/2019    HGB 7 2 (L) 10/08/2019    HCT 23 0 (L) 10/08/2019    MCV 91 10/08/2019     (L) 10/08/2019       Lab Results   Component Value Date    GLUCOSE 219 (H) 12/19/2017    CALCIUM 8 4 10/06/2019     10/27/2017    K 4 2 10/06/2019    CO2 21 10/06/2019     10/06/2019    BUN 24 10/06/2019    CREATININE 0 81 10/06/2019           Physical Exam   Constitutional: She is oriented to person, place, and time  She appears well-developed and well-nourished  No distress  Cardiovascular: Normal rate and regular rhythm  Pulmonary/Chest: Effort normal    Abdominal: Soft  She exhibits no distension  There is no tenderness  There is no guarding  Musculoskeletal: Normal range of motion  Neurological: She is alert and oriented to person, place, and time  Skin: Skin is warm  She is not diaphoretic  Psychiatric: She has a normal mood and affect   Her behavior is normal          Beth Parikh MD  80/4/8158  8:92 AM

## 2019-10-08 NOTE — DISCHARGE SUMMARY
Discharge- Raul Tena 1955, 60 y o  female MRN: [id number]    Unit/Bed#: [facility] 910-01 Encounter: [id number]    Primary Care Provider: Sandra Marin DO   Date and time admitted to hospital: 9/29/2019 10:04 PM      Discharge Summary - [facility]    Patient Information: Raul Tena 60 y o  female MRN: [id number]  Unit/Bed#: [facility] 910-01 Encounter: [id number]    Discharging Physician / Practitioner: Andrés Ortega DO  PCP: Sandra Marin DO  Admission Date: 9/29/2019  Discharge Date: 10/08/19    Disposition:     Other: [address] [place] [address]    Reason for Admission:   Generalized weakness due to deconditioning due to endometrial cancer    Discharge Diagnoses:     Principal Problem:    Generalized weakness  Active Problems:    Benign essential hypertension    Endometrial cancer ([address] )    Acute deep vein thrombosis (DVT) of proximal vein of lower extremity (HCC)    Anemia    MSSA bacteremia    Swelling of left upper extremity    Bilateral lower extremity edema    Moderate protein-calorie malnutrition ([address] )  Lung nodule  Resolved Problems:    Hyperkalemia      Consultations During Hospital Stay:  · Gyn Oncology  · Urology  · Medical Oncology    Procedures Performed:   Chest x-ray with borderline cardiomegaly  Upper extremity venous Doppler with acute thrombus in the internal jugular vein    Successful exchange of right nephroureteral stent by IR    CT abdomen pelvis with 4 mm right lower lobe lung nodule  ·     Significant Findings / Test Results:     · As above    Incidental Findings:   · As above    Test Results Pending at Discharge (will require follow up):    · Non     Outpatient Tests Requested:  · Chest CT scan in 3 months    Complications:  none    Hospital Course:     Raul Tena is a 60 y o  female patient who originally presented to the hospital on 9/29/2019 due to unable to go up stairs  Patient with PMH significant for MSSA bacteremia receiving 6 weeks of abx, recurrent endometrial cancer, acute DVT, anemia 2/2 chemo, HTN who presents with generalized weakness the same day she was discharge earlier from the hospital   Patient states she was doing well with PT throughout hospitalization and admits to refusing rehab placement  When she got home from the hospital this afternoon patient states she was unable to lift her right leg in order to go up 2 steps into her house  She presented to [facility] ED with [facility] for continuity of care    Generalized weakness secondary to physical deconditioning, patient agreed for rehab  Patient with underlying endometrial cancer, She received Taxol/carboplatin and received last chemotherapy 09/09/2019, Received granix 08/31/2019, also Neulasta 09/09/2019 and 09/16/2019  Chemotherapy to be on hold for now given acute infection    Patient with MSSA bacteremia in the setting of Port-A-Cath, catheter was removed 09/20 and PICC line was placed with plan to complete 6 weeks of IV antibiotic through 11/4/2019    Patient on Eliquis was switched to Lovenox by GYN Oncology, found to have acute right internal jugular vein DVT on venous Doppler    Patient with right percutaneous nephrostomy tube performed on 7/18/2019 due to hydronephrosis secondary to recurrence of endometrial carcinoma  She was evaluated by Urology due to hematuria, stent exchanged by IR on 10/4      Currently patient is in stable condition, tolerated oral diet, she was transfused 1 unit of PRBC today, she agreed to rehab she will be discharged to [facility] [address] for outpatient gyn oncology follow-up    Incidental finding of right lower lobe lung mass, recommendation to repeat chest CT scan 3 months        Condition at Discharge: stable     Discharge Day Visit / Exam:     Subjective:    Patient seen and examined  Comfortable in bed  No chest pain or shortness of breath  Hemoglobin down to 7 2  No active bleeding  Plan to transfuse 1 unit of packed red blood cells today  Vitals: Blood Pressure: 159/80 (10/08/19 1310)  Pulse: 92 (10/08/19 1310)  Temperature: (!) 97 4 °F (36 3 °C) (10/08/19 1310)  Temp Source: Oral (10/05/19 2100)  Respirations: 18 (10/08/19 1310)  Height: 4' 11" (149 9 cm) (10/02/19 1227)  Weight - Scale: 89 kg (196 lb 3 4 oz)(9/29) (10/02/19 1227)  SpO2: 97 % (10/08/19 1310)  Exam:   Physical Exam  Patient is awake alert oriented no acute distress  Lung clear to auscultation bilateral anteriorly  Heart positive S1-S2 no murmur  Abdomen soft nontender positive bowel sounds  Lower extremities no edema  Discussion with Family:  Offered to call family patient refused    Discharge instructions/Information to patient and family:   See after visit summary for information provided to patient and family  Provisions for Follow-Up Care:  See after visit summary for information related to follow-up care and any pertinent home health orders  Planned Readmission: no     Discharge Statement:  I spent 45  minutes discharging the patient  This time was spent on the day of discharge  I had direct contact with the patient on the day of discharge  Greater than 50% of the total time was spent examining patient, answering all patient questions, arranging and discussing plan of care with patient as well as directly providing post-discharge instructions  Additional time then spent on discharge activities  Discharge Medications:  See after visit summary for reconciled discharge medications provided to patient and family        ** Please Note: This note has been constructed using a voice recognition system **

## 2019-10-08 NOTE — PROGRESS NOTES
Medical Oncology/Hematology Progress Note  Adriana Angel, female, 60 y o , 1955,  [facility] 910-01, [id number]     Assessment and Plan  1  Anemia   28-year-old female with history of recurrent endometrial cancer currently receiving treatment with carboplatin and Taxol as well as recent bacteremia  She remains on IV antibiotics  She continues to have anemia  Workup ordered yesterday was unrevealing  Likely this represents extended bone marrow suppression from carboplatin as well as from recent infection  Discussed with primary team   Patient likely is planned for discharge today to a rehab facility  She will require at least weekly CBC monitoring and transfusions as needed  Will update GYN Oncology team as well  Subjective: Patient is feeling well  She denies any dizziness, lightheadedness  She states that she was slightly more fatigued this morning  She was able to wash by herself and ambulate to the bathroom by herself  She did not have any shortness of breath ambulating to the bathroom  She denies any black or blood in the stool  No abnormal other bleeding or bruising symptoms  She states that she walked with physical therapy up and down the stairs today  She tolerated this well except for easy fatigability      Objective:     Medication Administration - last 24 hours from 10/07/2019 1150 to 10/08/2019 1150       Date/Time Order Dose Route Action Action by     10/08/2019 0900 ARIPiprazole (ABILIFY) tablet 10 mg 10 mg Oral Given Sierra Nevarez RN     10/08/2019 0731 ceFAZolin (ANCEF) IVPB (premix) 2,000 mg 2,000 mg Intravenous New Bag Sierra Nevarez RN     10/07/2019 2314 ceFAZolin (ANCEF) IVPB (premix) 2,000 mg 2,000 mg Intravenous [address] Dilcia Flanagan, [facility]     10/07/2019 1734 ceFAZolin (ANCEF) IVPB (premix) 2,000 mg 2,000 mg Intravenous [address] Jessica Michele, [facility]     10/08/2019 3287 cholecalciferol (VITAMIN D3) tablet 1,000 Units 1,000 Units Oral Given Sierra Nevarez RN 10/08/2019 0510 pantoprazole (PROTONIX) EC tablet 40 mg 40 mg Oral Given [facility], RN     10/08/2019 0901 venlafaxine (EFFEXOR) tablet 75 mg 75 mg Oral Given Palencia Yosvany, RN     10/07/2019 2114 venlafaxine (EFFEXOR) tablet 75 mg 75 mg Oral Given [facility], RN     10/07/2019 1735 venlafaxine (EFFEXOR) tablet 75 mg 75 mg Oral Given Yao , RN     10/07/2019 2003 LORazepam (ATIVAN) tablet 1 mg 1 mg Oral Given [facility], RN     10/08/2019 0901 saccharomyces boulardii (FLORASTOR) capsule 250 mg 250 mg [facility]     10/07/2019 1734 saccharomyces boulardii (FLORASTOR) capsule 250 mg 250 mg Oral Given [facility], RN     10/08/2019 0907 enoxaparin (LOVENOX) subcutaneous injection 90 mg 90 mg Subcutaneous Given [facility], RN     10/07/2019 2114 enoxaparin (LOVENOX) subcutaneous injection 90 mg 90 mg Subcutaneous Given [facility], RN     10/08/2019 0901 aspirin (ECOTRIN LOW STRENGTH) EC tablet 81 mg 81 mg Oral Given [facility], RN     10/07/2019 1741 aspirin (ECOTRIN LOW STRENGTH) EC tablet 81 mg 81 mg Oral Given [facility], RN     10/08/2019 0902 lisinopril (ZESTRIL) tablet 5 mg 5 mg Oral Given [facility], RN          /92   Pulse 81   Temp 98 2 °F (36 8 °C)   Resp 18   Ht 4' 11" (1 499 m)   Wt 89 kg (196 lb 3 4 oz) Comment: 9/29  LMP  (LMP Unknown)   SpO2 96%   BMI 39 63 kg/m²       Physical Exam   Constitutional: She is oriented to person, place, and time  She appears well-developed and well-nourished  No distress  HENT:   Head: Normocephalic and atraumatic  Eyes: Conjunctivae are normal  No scleral icterus  Neck: Normal range of motion  Neck supple  Cardiovascular: Normal rate, regular rhythm and normal heart sounds  No murmur heard  Pulmonary/Chest: Effort normal and breath sounds normal  No respiratory distress  Abdominal: Soft  There is no tenderness  Musculoskeletal: She exhibits no edema or tenderness  Neurological: She is alert and oriented to person, place, and time  Skin: Skin is warm and dry  Psychiatric: She has a normal mood and affect         Recent Results (from the past 48 hour(s))   CBC and differential    Collection Time: 10/07/19  5:37 AM   Result Value Ref Range    WBC 6 52 4 31 - 10 16 Thousand/uL    RBC 2 71 (L) 3 81 - 5 12 Million/uL    Hemoglobin 7 6 (L) 11 5 - 15 4 g/dL    Hematocrit 24 0 (L) 34 8 - 46 1 %    MCV 89 82 - 98 fL    MCH 28 0 26 8 - 34 3 pg    MCHC 31 7 31 4 - 37 4 g/dL    RDW 17 0 (H) 11 6 - 15 1 %    MPV 10 5 8 9 - 12 7 fL    Platelets 882 (L) 147 - 390 Thousands/uL    nRBC 0 /100 WBCs    Neutrophils Relative 66 43 - 75 %    Immat GRANS % 2 0 - 2 %    Lymphocytes Relative 21 14 - 44 %    Monocytes Relative 10 4 - 12 %    Eosinophils Relative 1 0 - 6 %    Basophils Relative 0 0 - 1 %    Neutrophils Absolute 4 26 1 85 - 7 62 Thousands/µL    Immature Grans Absolute 0 13 0 00 - 0 20 Thousand/uL    Lymphocytes Absolute 1 37 0 60 - 4 47 Thousands/µL    Monocytes Absolute 0 67 0 17 - 1 22 Thousand/µL    Eosinophils Absolute 0 08 0 00 - 0 61 Thousand/µL    Basophils Absolute 0 01 0 00 - 0 10 Thousands/µL   Retic Count    Collection Time: 10/07/19  5:37 AM   Result Value Ref Range    Retic Ct Abs 62,400 68,202-45,950    Retic Ct Pct 2 26 (H) 0 37 - 1 87 %   Vitamin B12    Collection Time: 10/07/19  8:11 PM   Result Value Ref Range    Vitamin B-12 1,777 (H) 100 - 900 pg/mL   CBC and differential    Collection Time: 10/08/19  5:15 AM   Result Value Ref Range    WBC 6 33 4 31 - 10 16 Thousand/uL    RBC 2 54 (L) 3 81 - 5 12 Million/uL    Hemoglobin 7 2 (L) 11 5 - 15 4 g/dL    Hematocrit 23 0 (L) 34 8 - 46 1 %    MCV 91 82 - 98 fL    MCH 28 3 26 8 - 34 3 pg    MCHC 31 3 (L) 31 4 - 37 4 g/dL    RDW 16 9 (H) 11 6 - 15 1 %    MPV 9 8 8 9 - 12 7 fL    Platelets 697 (L) 518 - 390 Thousands/uL    nRBC 0 /100 WBCs    Neutrophils Relative 59 43 - 75 %    Immat GRANS % 2 0 - 2 %    Lymphocytes Relative 27 14 - 44 %    Monocytes Relative 9 4 - 12 %    Eosinophils Relative 2 0 - 6 %    Basophils Relative 1 0 - 1 %    Neutrophils Absolute 3 76 1 85 - 7 62 Thousands/µL    Immature Grans Absolute 0 11 0 00 - 0 20 Thousand/uL    Lymphocytes Absolute 1 73 0 60 - 4 47 Thousands/µL    Monocytes Absolute 0 59 0 17 - 1 22 Thousand/µL    Eosinophils Absolute 0 11 0 00 - 0 61 Thousand/µL    Basophils Absolute 0 03 0 00 - 0 10 Thousands/µL   Type and screen    Collection Time: 10/08/19  8:56 AM   Result Value Ref Range    ABO Grouping O     Rh Factor Positive     Antibody Screen Negative     Specimen Expiration Date [id number]    Prepare RBC:Has consent been obtained? Yes, 1 Units, Leukoreduced, Irradiated    Collection Time: 10/08/19 10:35 AM   Result Value Ref Range    Unit Product Code R2942A02     Unit Number [id number]     Unit ABO O     Unit RH POS     Crossmatch Compatible     Unit Dispense Status Issued      I have personally reviewed labs, imaging studies, and pertinent reports  This note has been generated by voice recognition software system  Therefore, there may be spelling, grammar, and or syntax errors  Please contact if questions arise

## 2019-10-08 NOTE — PROGRESS NOTES
Pastoral Care Progress Note    10/8/2019  Patient: Serenity Huerta : 1955  Admission Date & Time: 2019 2204  MRN: [id number] CSN: [id number]                     Chaplaincy Interventions Utilized:                Relationship Building: Cultivated a relationship of care and support    Ritual: Eleazar Luz provided sacrament of the sick and Provided prayer    Eleazar Luz provided and blessing and anointed patient     notified her  of hospitalization        Chaplaincy Outcomes Achieved:          Spiritual Coping Strategies Utilized:   Connectedness and Spiritual comfort    Patient found comfort in the blessing and being anointed by the        10/07/19 0900   Clinical Encounter Type   Visited With Patient   Routine Visit Follow-up   Continue Visiting Yes   [facility] Encounters   [facility] Needs Prayer   Sacramental Encounters   Communion Given Indicator Yes   Sacrament of Sick-Anointing Anointed

## 2019-10-09 ENCOUNTER — TELEPHONE (OUTPATIENT)
Dept: INFECTIOUS DISEASES | Facility: CLINIC | Age: 64
End: 2019-10-09

## 2019-10-09 DIAGNOSIS — T45.1X5A ANEMIA DUE TO CHEMOTHERAPY: Primary | ICD-10-CM

## 2019-10-09 DIAGNOSIS — D64.81 ANEMIA DUE TO CHEMOTHERAPY: Primary | ICD-10-CM

## 2019-10-09 NOTE — UTILIZATION REVIEW
Notification of Discharge  This is a Notification of Discharge from our facility [address]  Please be advised that this patient has been discharge from our facility  Below you will find the admission and discharge date and time including the patients disposition  PRESENTATION DATE: 9/29/2019 10:04 PM  OBS ADMISSION DATE:   IP ADMISSION DATE: 10/2/19 1542   DISCHARGE DATE: 10/8/2019  4:18 PM  DISPOSITION: Non [facility] Non [facility]   Admission Orders listed below:  Admission Orders (From admission, onward)     Ordered        10/02/19 1542  Inpatient Admission  Once         09/29/19 2301  Place in Observation  Once                   Please contact the UR Department if additional information is required to close this patient's authorization/case  [address]  Utilization Review Department  Phone: [phone]; Fax [phone]  [email or web address]  ATTENTION: Please call with any questions or concerns to [phone]  and carefully listen to the prompts so that you are directed to the right person  Send all requests for admission clinical reviews, approved or denied determinations and any other requests to fax [phone]   All voicemails are confidential

## 2019-10-09 NOTE — TELEPHONE ENCOUNTER
Spoke with Lynne Staton on [employer] [facility] [address] regarding f/u in office and weekly labs for patient  At [address] [place] [address] request, I faxed orders and appt reminder to [phone]

## 2019-10-10 LAB
METHYLMALONATE SERPL-SCNC: 164 NMOL/L (ref 0–378)
SL AMB DISCLAIMER: NORMAL

## 2019-10-11 ENCOUNTER — NURSING HOME VISIT (OUTPATIENT)
Dept: GERIATRICS | Facility: OTHER | Age: 64
End: 2019-10-11
Payer: COMMERCIAL

## 2019-10-11 DIAGNOSIS — F32.9 MAJOR DEPRESSION, CHRONIC: ICD-10-CM

## 2019-10-11 DIAGNOSIS — R78.81 MSSA BACTEREMIA: Primary | ICD-10-CM

## 2019-10-11 DIAGNOSIS — I10 BENIGN ESSENTIAL HYPERTENSION: ICD-10-CM

## 2019-10-11 DIAGNOSIS — D50.8 OTHER IRON DEFICIENCY ANEMIA: ICD-10-CM

## 2019-10-11 DIAGNOSIS — R26.2 AMBULATORY DYSFUNCTION: ICD-10-CM

## 2019-10-11 DIAGNOSIS — R53.1 GENERALIZED WEAKNESS: ICD-10-CM

## 2019-10-11 DIAGNOSIS — N13.39 OTHER HYDRONEPHROSIS: ICD-10-CM

## 2019-10-11 DIAGNOSIS — C54.1 ENDOMETRIAL CANCER (HCC): Chronic | ICD-10-CM

## 2019-10-11 DIAGNOSIS — B95.61 MSSA BACTEREMIA: Primary | ICD-10-CM

## 2019-10-11 PROCEDURE — 99306 1ST NF CARE HIGH MDM 50: CPT | Performed by: FAMILY MEDICINE

## 2019-10-11 NOTE — PROGRESS NOTES
Kath [address]  [address]  History and Physical    NAME: Ilsa Melendez  AGE: 60 y o  SEX: female [id number]    DATE OF ENCOUNTER: 10/11/2019    Code status:  CPR    Assessment and Plan     1  MSSA bacteremia:  Continue cefazolin for a total of 6 weeks     -  Continue PICC care  2  Leg weakness/ ambulatory dysfunction:  PT/OT ordered  3  Hydronephrosis secondary to obstruction:s/p   Nephrostomy tube  -  Continue Myrbetriq 50 mg daily     -   Follow up with Urology,  Continue nephrostomy tube care  4  History of endometrial carcinoma  s/p chemo     -  Continue folic acid,  Zofran as needed  5  Depression/anxiety:  Lorazepam p r n      -  Venlafaxine 75 mg 3 times a day     -  Continue aripiprazole 10 mg p o  daily  6  H/o  DVT:  Continue Lovenox 90 mg sc twice a day  7  Hypertension:  Stable on lisinopril 5 mg daily   disposition:  Discharged to home with services after completion of therapy  All medications and routine orders were reviewed and updated as needed  Plan discussed with: Patient    Chief Complaint     Seen for admission at [place] [address], a 59 y/o referred to [facility] for subacute rehab  She was recently admitted to the hospital with MSSA bacteremia  due to Port-A-Cath infection,  catheter was removed and placed on 6 weeks of antibiotics( cefazolin ),  sent home with visiting nurse services,  but she is not able to walk at home and came back to the hospital   PT evaluated her and recommended rehab placement  She was seen and examined at bedside, stable  States she is feeling well, her legs are weak not able to walk      HISTORY:  Past Medical History:   Diagnosis Date    Anemia     Chemotherapy follow-up examination     Depression     Diabetes mellitus ([facility] )     Endometrial cancer ([facility] ) 12/2017    Hyperglycemia     vx type 2 dm -- last assessed 4/1/14; resolved 11/7/17    Hyperlipidemia     Hypertension     Obesity     last assessed 10/14/17; resolved 11/7/17     Family History   Problem Relation Age of Onset    Other Mother         dyslipidemia    Ovarian cancer Mother 48    Lymphoma Father     Bone cancer Maternal Grandfather     No Known Problems Sister     No Known Problems Maternal Grandmother     No Known Problems Paternal Grandmother     No Known Problems Paternal Grandfather     No Known Problems Maternal Aunt     No Known Problems Maternal Aunt     No Known Problems Maternal Aunt     No Known Problems Maternal Aunt     No Known Problems Paternal Aunt     No Known Problems Paternal Aunt     No Known Problems Paternal Aunt     No Known Problems Paternal Aunt     No Known Problems Brother     No Known Problems Brother      Social History     Socioeconomic History    Marital status: Single     Spouse name: None    Number of children: None    Years of education: None    Highest education level: None   Occupational History    None   Social Needs    Financial resource strain: None    Food insecurity:     Worry: None     Inability: None    Transportation needs:     Medical: None     Non-medical: None   Tobacco Use    Smoking status: Never Smoker    Smokeless tobacco: Never Used   Substance and Sexual Activity    Alcohol use: Not Currently    Drug use: No    Sexual activity: Not Currently   Lifestyle    Physical activity:     Days per week: None     Minutes per session: None    Stress: None   Relationships    Social connections:     Talks on phone: None     Gets together: None     Attends Yaz[employer] service: None     Active member of club or organization: None     Attends meetings of clubs or organizations: None     Relationship status: None    Intimate partner violence:     Fear of current or ex partner: None     Emotionally abused: None     Physically abused: None     Forced sexual activity: None   Other Topics Concern    None Social History Narrative    None       Allergies: Allergies   Allergen Reactions    Cephalosporins Rash     Which Cephalosporin reaction was to not specified; however, has tolerated Amoxicillin, Cefazolin, and Cefepime       Review of Systems     Review of Systems   Constitutional: Positive for fatigue  Genitourinary: Positive for difficulty urinating  Musculoskeletal: Positive for gait problem  Neurological: Positive for weakness  All other systems reviewed and are negative  Medications and orders     All medications reviewed and updated in Nursing Home EMR  Objective     Vitals:   Temp 98 1, pulse 85, resp 18, /80, 96% on room air, weight 204 6 lb    Physical Exam   Constitutional: She is oriented to person, place, and time  She appears well-developed and well-nourished  HENT:   Head: Normocephalic  Eyes: Conjunctivae and EOM are normal  Right eye exhibits no discharge  Left eye exhibits no discharge  No scleral icterus  Marcos Mary wears glasses  Neck: Normal range of motion  Cardiovascular: Normal rate, regular rhythm and normal heart sounds  No murmur heard  Pulmonary/Chest: Effort normal and breath sounds normal  No respiratory distress  She has no wheezes  Abdominal: Soft  Bowel sounds are normal  She exhibits no distension  There is no tenderness  Genitourinary:   Genitourinary Comments: Right nephrostomy tube+   Musculoskeletal: Normal range of motion  She exhibits no edema or tenderness  Neurological: She is alert and oriented to person, place, and time  No cranial nerve deficit  Skin: Skin is warm and dry  PICC line rt arm   Psychiatric: She has a normal mood and affect  Her behavior is normal    Nursing note and vitals reviewed  Pertinent Laboratory/Diagnostic Studies: The following labs/studies were reviewed please see chart or hospital paperwork for details    Ref Range & Units 10/10/19 0455    Hemoglobin 11 5 - 14 5 g/dL 8 5Low     Hematocrit 35 0 - 43 0 % 25 7Low     WBC 4 0 - 10 0 thou/cmm 6 8    RBC 3 70 - 4 70 mill/cmm 3 00Low     Platelet Count 502 - 350 thou/cmm 137Low     MPV 7 5 - 11 3 fL 10 3    MCV 80 - 100 fL 86    MCH 26 0 - 34 0 pg 28 2    MCHC 32 0 - 37 0 g/dL 32 9    RDW 12 0 - 16 0 % 18 9High     Differential Type   AUTO    Absolute Neutrophils 1 6 - 7 0 thou/cmm 4 4    Absolute Lymphocytes 0 6 - 4 4 thou/cmm 1 7    Absolute Monocytes 0 2 - 1 3 thou/cmm 0 5    Absolute Eosinophils 0 - 0 7 thou/cmm 0 1    Absolute Basophils 0 - 0 2 thou/cmm 0 0    Neutrophils 40 - 70 % 65    Lymphocytes 15 - 44 % 25    Monocytes 5 - 13 % 7    Eosinophils 0 - 7 % 2    Basophils 0 - 2 % 1      Ref Range & Units 10/6/19 0502    Sodium 136 - 145 mmol/L 137    Potassium 3 5 - 5 3 mmol/L 4 2    Chloride 100 - 108 mmol/L 108    CO2 21 - 32 mmol/L 21    ANION GAP 4 - 13 mmol/L 8    BUN 5 - 25 mg/dL 24    Creatinine 0 60 - 1 30 mg/dL 0 81    Comment: Standardized to IDMS reference method   Glucose 65 - 140 mg/dL 79       Calcium 8 3 - 10 1 mg/dL 8 4    eGFR ml/min/1 73sq m 78      - Medication Side Effects: Adverse side effects of medications were reviewed with the patient/guardian today  DOS: 10/11/2019  Facility: [facility] SNF List: [facility]  BILLING CODE: [id number] place of service: POS [facility] Coverage  Diagnoses:   Diagnosis ICD-10-CM Associated Orders   1  MSSA bacteremia R78 81    2  Generalized weakness R53 1    3  Ambulatory dysfunction R26 2    4  Other iron deficiency anemia D50 8    5  Benign essential hypertension I10    6  Endometrial cancer ([address] ) C54 1    7  Major depression, chronic F32 9    8   Other hydronephrosis N13 39

## 2019-10-14 ENCOUNTER — TELEPHONE (OUTPATIENT)
Dept: UROLOGY | Facility: MEDICAL CENTER | Age: 64
End: 2019-10-14

## 2019-10-14 NOTE — TELEPHONE ENCOUNTER
Patient of Dr Fairy Bloch seen in [place] office  Patient currently In [place]  Patient called to report having blood in the nephrotomy bag  Please  advise    She can be reached at [phone]  Thank you

## 2019-10-14 NOTE — TELEPHONE ENCOUNTER
Patient was recently discharged from hospital for hematuria and weakness from 9/22-10/5  She has metastatic endometrial carcinoma with R nephrostomy tube (718/19) Had tube changes 10/4/19  Patient is at [facility] and she said they only flush tube about every 3rd day  She was wondering if it should be flushed more and is concerned    [address] phone number for order is [phone]

## 2019-10-15 ENCOUNTER — TELEPHONE (OUTPATIENT)
Dept: GASTROENTEROLOGY | Facility: CLINIC | Age: 64
End: 2019-10-15

## 2019-10-15 ENCOUNTER — OFFICE VISIT (OUTPATIENT)
Dept: INFECTIOUS DISEASES | Facility: CLINIC | Age: 64
End: 2019-10-15
Payer: COMMERCIAL

## 2019-10-15 VITALS
DIASTOLIC BLOOD PRESSURE: 58 MMHG | HEIGHT: 59 IN | SYSTOLIC BLOOD PRESSURE: 100 MMHG | HEART RATE: 99 BPM | BODY MASS INDEX: 40.32 KG/M2 | TEMPERATURE: 98.7 F | WEIGHT: 200 LBS

## 2019-10-15 DIAGNOSIS — C54.1 ENDOMETRIAL CANCER (HCC): Chronic | ICD-10-CM

## 2019-10-15 DIAGNOSIS — R78.81 MSSA BACTEREMIA: Primary | ICD-10-CM

## 2019-10-15 DIAGNOSIS — B95.61 MSSA BACTEREMIA: Primary | ICD-10-CM

## 2019-10-15 PROCEDURE — 99214 OFFICE O/P EST MOD 30 MIN: CPT | Performed by: PHYSICIAN ASSISTANT

## 2019-10-15 NOTE — ASSESSMENT & PLAN NOTE
MSSA bacteremia secondary to Port-A-Cath infection with associated right IJ septic phlebitis and possible pulmonary septic emboli  Patient now status post removal of Port-A-Cath  TTE negative, TRINH unsuccessful  CT of the chest showed right IJ septic thrombophlebitis  Patient currently at [facility] it to complete a 6 week course of IV cefazolin through 11/04/2019  Patient tolerating the antibiotic without difficulty    Labs stable to date     -continue IV cefazolin as ordered through 11/04/2019  -please continue weekly blood work including CBC with diff, creatinine, CRP  -return to office in 2 weeks for follow-up

## 2019-10-15 NOTE — PROGRESS NOTES
Assessment/Plan:    MSSA bacteremia  MSSA bacteremia secondary to Port-A-Cath infection with associated right IJ septic phlebitis and possible pulmonary septic emboli  Patient now status post removal of Port-A-Cath  TTE negative, TRINH unsuccessful  CT of the chest showed right IJ septic thrombophlebitis  Patient currently at SNF it to complete a 6 week course of IV cefazolin through 11/04/2019  Patient tolerating the antibiotic without difficulty  Labs stable to date     -continue IV cefazolin as ordered through 11/04/2019  -please continue weekly blood work including CBC with diff, creatinine, CRP  -return to office in 2 weeks for follow-up    Endometrial cancer ([facility] )  Chemo presently on hold       Diagnoses and all orders for this visit:    MSSA bacteremia    Endometrial cancer ([facility] )          Subjective:      Patient ID: Raul Tena is a 60 y o  female  HPI  60-year-old female presents for office follow-up regarding MSSA bacteremia secondary to Port-A-Cath infection and right IJ septic phlebitis and possible pulmonary septic emboli  Patient underwent removal of Port-A-Cath while hospitalized  TTE was negative TRINH unsuccessful  CT of the chest showed right IJ septic thrombophlebitis  She was eventually discharged to SNF to complete a 6 week course of IV cefazolin  Patient is tolerating the antibiotics without difficulty  She denies any fevers chills rash or diarrhea  She denies any issues with her PICC line  Her chemotherapy is currently on hold for endometrial cancer  The following portions of the patient's history were reviewed and updated as appropriate: allergies, current medications, past family history, past medical history, past social history, past surgical history and problem list     Review of Systems   Constitutional: Negative for chills and fever  HENT: Negative for mouth sores  Respiratory: Negative for cough and shortness of breath      Cardiovascular: Negative for chest pain, palpitations and leg swelling  Gastrointestinal: Negative for abdominal pain, diarrhea, nausea and vomiting  Genitourinary: Negative for difficulty urinating, dysuria and frequency  Musculoskeletal: Negative for arthralgias, back pain, joint swelling and myalgias  Skin: Negative for rash and wound  Allergic/Immunologic: Positive for immunocompromised state  Neurological: Negative for headaches  Psychiatric/Behavioral: Negative for behavioral problems and confusion  Objective:      /58   Pulse 99   Temp 98 7 °F (37 1 °C)   Ht 4' 11" (1 499 m)   Wt 90 7 kg (200 lb)   LMP  (LMP Unknown)   BMI 40 40 kg/m²          Physical Exam   Constitutional: She is oriented to person, place, and time  She appears well-developed and well-nourished  No distress  HENT:   Head: Normocephalic and atraumatic  Right Ear: External ear normal    Left Ear: External ear normal    Nose: Nose normal    Mouth/Throat: Oropharynx is clear and moist  No oropharyngeal exudate  Eyes: Conjunctivae are normal  Right eye exhibits no discharge  Left eye exhibits no discharge  No scleral icterus  Neck: Normal range of motion  Neck supple  Cardiovascular: Normal rate and regular rhythm  Pulmonary/Chest: Effort normal and breath sounds normal  No stridor  No respiratory distress  She has no wheezes  She has no rales  She exhibits no tenderness  Abdominal: Soft  Bowel sounds are normal  She exhibits no distension  There is no tenderness  Neurological: She is alert and oriented to person, place, and time  Skin: Skin is warm and dry  No rash noted  She is not diaphoretic  No pallor  PICC insertion site without erythema drainage or tenderness   Psychiatric: She has a normal mood and affect  Her behavior is normal    Nursing note and vitals reviewed        Labs:  10/10/2019  WBC 6 8  Hemoglobin 8 5  Platelets 418  Creatinine 0 75  CRP 20

## 2019-10-16 NOTE — PROGRESS NOTES
Outpatient Oncology Nutrition Consult  Type of Consult: Follow Up  Care Location: Infusion Center   Nutrition Assessment:  PMH: anemia, depression, DM, HLD, HTN, obesity, RNYGB (2001 at [address])  S/p hospitalizations 9/19/19-9/29/19 for sepsis and 9/29/19-10/8/19 for weakness  Now living at [facility] while on IV Abx  Oncology Diagnosis & Treatments: Endometrial cancer  S/p surgery 12/19/17  Recurrence 7/8/19  Undergoing chemotherapy (started 7/30/19 and currently on hold until IV abx are finished; was receiving carboplatin, taxol)  Then for potential radiation therapy  Endometrial cancer ([facility] [address] )    11/17/2017 Initial Diagnosis     Endometrial cancer ([facility] [address] )      12/19/2017 Surgery     Robotic assisted total laparoscopic hysterectomy with bilateral salpingo-oophorectomy and sentinel bilateral pelvic lymph node dissection  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 (4 4 x 3 2 cm tumor, 9 4/15 4mm invasion, NO LVSI, washings revealed atypical cellular changes)      12/19/2017 Genetic Testing     Morrison testing negative      7/8/2019 Recurrence     Presented with right lower extremity DVT and CT demonstrating right pelvic sidewall mass with venous, ureteral and nerve compression causing significant neuropathic pain  Core biopsy demonstrates high-grade carcinoma        7/30/2019 -  Chemotherapy     pegfilgrastim (NEULASTA ONPRO) subcutaneous injection kit 6 mg, 6 mg, Subcutaneous, Once, 2 of 4 cycles  Administration: 6 mg (9/9/2019)  CARBOplatin (PARAPLATIN) 574 2 mg in sodium chloride 0 9 % 250 mL IVPB, 574 2 mg, Intravenous, Once, 4 of 6 cycles  Administration: 574 2 mg (7/30/2019), 553 2 mg (8/19/2019), 678 6 mg (9/9/2019)  PACLitaxel (TAXOL) 337 8 mg in sodium chloride 0 9 % 500 mL chemo IVPB, 175 mg/m2 = 337 8 mg, Intravenous, Once, 4 of 6 cycles  Dose modification: 135 mg/m2 (original dose 175 mg/m2, Cycle 4, Reason: Other (See Comments), Comment: prior hospital admission/complications)  Administration: 337 8 mg (7/30/2019), 327 mg (8/19/2019), 327 mg (9/9/2019)       Past Medical History:   Diagnosis Date    Anemia     Chemotherapy follow-up examination     Depression     Diabetes mellitus ([facility] [address] )     Endometrial cancer ([facility] [address] ) 12/2017    Hyperglycemia     vx type 2 dm -- last assessed 4/1/14; resolved 11/7/17    Hyperlipidemia     Hypertension     Obesity     last assessed 10/14/17; resolved 11/7/17     Past Surgical History:   Procedure Laterality Date    ABDOMINAL SURGERY      GASTRIC BYPASS    CHOLECYSTECTOMY      at the time of gastric bypass    COLONOSCOPY      CT NEEDLE BIOPSY LYMPH NODE  7/8/2019    GASTRIC BYPASS      HYSTERECTOMY Bilateral     total abdominal with salpingo-oophorectomy    IR PICC LINE  9/27/2019    IR PORT PLACEMENT  7/26/2019    IR PORT REMOVAL  9/20/2019    IR TUBE PLACEMENT NEPHROSTOMY  7/26/2019    OOPHORECTOMY Bilateral     OK LAP, RADICAL HYST W/ TUBE&OV, NODE BX N/A 12/19/2017    Procedure: HYSTERECTOMY LAPAROSCOPIC TOTAL ([address]) W/ ROBOTICS; BILATERAL SALPINGOOPHERECTOMY; LYMPH NODE DISSECTION; lysis of adhesions;  Surgeon: Greg Cárdenas MD;  Location: [facility];  Service: Gynecology Oncology    OK LAP,DIAGNOSTIC ABDOMEN N/A 12/19/2017    Procedure: LAPAROSCOPY DIAGNOSTIC;  Surgeon: Greg Cárdenas MD;  [facility];  Service: Gynecology Oncology    TONSILLECTOMY      US GUIDED BREAST BIOPSY RIGHT COMPLETE Right 6/28/2019       Review of Medications:   Vitamins, Supplements and Herbals: yes: Vitamin D and Folic Acid; Plans to ask [facility] RD about post-RNYGB vitamin regimen    Current Outpatient Medications:     acetaminophen (TYLENOL) 325 mg tablet, Take 2 tablets (650 mg total) by mouth every 6 (six) hours as needed for mild pain, headaches or fever, Disp: 30 tablet, Rfl: 0    ARIPiprazole (ABILIFY) 10 mg tablet, Take 10 mg by mouth daily, Disp: , Rfl:     aspirin (ECOTRIN LOW STRENGTH) 81 mg EC tablet, Take 81 mg by mouth daily, Disp: , Rfl:     benzonatate (TESSALON) 200 MG capsule, Take 1 capsule (200 mg total) by mouth 3 (three) times a day as needed for cough, Disp: 20 capsule, Rfl: 0    ceFAZolin (ANCEF) 2000 mg IVPB, Infuse 2,000 mg into a venous catheter every 8 (eight) hours, Disp: , Rfl: 0    cholecalciferol (VITAMIN D3) 1,000 units tablet, Take 1,000 Units by mouth daily, Disp: , Rfl:     CRANIAL PROSTHESIS, RX,, Please provide patient with extracranial prosthesis while undergoing active chemotherapy treatment  (Patient not taking: Reported on 10/15/2019), Disp: 1 Piece, Rfl: 0    enoxaparin (LOVENOX) 100 mg/mL, Inject 0 9 mL (90 mg total) under the skin every 12 (twelve) hours, Disp: 54 mL, Rfl: 0    folic acid (FOLVITE) 1 mg tablet, Take 1 tablet (1 mg total) by mouth daily, Disp: , Rfl: 0    gemfibrozil (LOPID) 600 mg tablet, TAKE 1 TABLET BY MOUTH DAILY, Disp: 90 tablet, Rfl: 1    lisinopril (ZESTRIL) 5 mg tablet, Take 1 tablet (5 mg total) by mouth daily, Disp: , Rfl: 0    LORazepam (ATIVAN) 1 mg tablet, Take 1 tablet (1 mg total) by mouth every 6 (six) hours as needed for anxiety (or nausea) for up to 10 days, Disp: 10 tablet, Rfl: 0    MYRBETRIQ 50 MG TB24, TAKE 1 TABLET DAILY, Disp: 30 tablet, Rfl: 10    omeprazole (PriLOSEC) 20 mg delayed release capsule, Take 20 mg by mouth daily, Disp: , Rfl:     ondansetron (ZOFRAN) 8 mg tablet, Take 1 tablet (8 mg total) by mouth every 8 (eight) hours as needed for nausea or vomiting, Disp: 30 tablet, Rfl: 1    saccharomyces boulardii (FLORASTOR) 250 mg capsule, Take 1 capsule (250 mg total) by mouth 2 (two) times a day, Disp: , Rfl: 0    venlafaxine (EFFEXOR) 75 mg tablet, Take 75 mg by mouth 3 (three) times a day  , Disp: , Rfl:   No current facility-administered medications for this visit  Most Recent Lab Results: Does not check BG    Lab Results   Component Value Date    WBC 5 62 10/18/2019    IRON 24 (L) 10/02/2019    TIBC 138 (L) 10/02/2019    FERRITIN 1,042 (H) 10/02/2019    CHOL 183 10/27/2017    TRIG 88 11/01/2018    HDL 59 11/01/2018    LDLCALC 106 (H) 10/27/2017    ALT 8 (L) 10/18/2019    AST 27 10/18/2019     10/27/2017     05/12/2017    K 3 8 10/18/2019    K 4 2 10/06/2019    BUN 7 10/18/2019    BUN 24 10/06/2019    CREATININE 0 56 (L) 10/18/2019    CREATININE 0 81 10/06/2019    PHOS 3 2 07/08/2019    GLUCOSE 219 (H) 12/19/2017    GLUCOSE 111 (H) 10/27/2017    HGBA1C 6 2 08/28/2019    HGBA1C 5 5 11/01/2018    HGBA1C 5 6 03/12/2018    CALCIUM 7 4 (L) 10/18/2019    FOLATE 5 4 10/06/2019    MG 1 2 (L) 10/18/2019       Anthropometric Measurements:   Height: 59"  Ht Readings from Last 1 Encounters:   10/17/19 4' 11" (1 499 m)     -Weight History:   Usual Weight: 270# before RNYGB in 2001; lowest post-op wt: 200#; wt typically fluctuates between 215-230#  Ideal Body Weight: 95#  Wt Readings from Last 20 Encounters:   10/17/19 90 7 kg (200 lb)   10/15/19 90 7 kg (200 lb)   10/02/19 89 kg (196 lb 3 4 oz)   09/29/19 89 kg (196 lb 3 4 oz)   09/28/19 88 9 kg (196 lb)   09/12/19 91 2 kg (201 lb)   09/09/19 93 4 kg (206 lb)   09/06/19 93 9 kg (207 lb)   09/04/19 93 4 kg (206 lb)   08/28/19 88 kg (194 lb)   08/19/19 90 7 kg (200 lb)   08/02/19 94 3 kg (207 lb 12 8 oz)   08/01/19 95 9 kg (211 lb 8 oz)   07/30/19 96 6 kg (213 lb)   07/26/19 100 kg (221 lb)   07/22/19 100 kg (221 lb)   07/19/19 101 kg (222 lb 12 8 oz)   07/18/19 101 kg (222 lb)   07/16/19 101 kg (223 lb)   07/07/19 102 kg (224 lb 13 9 oz)     Weight Changes: Stable since last visit / 2 months (not significant)   Estimated body mass index is 40 4 kg/m² as calculated from the following:    Height as of 10/17/19: 4' 11" (1 499 m)  Weight as of 10/17/19: 90 7 kg (200 lb)       Nutrition-Focused Physical Findings: none observed     Food/Nutrition-Related History & Client/Social History:    Current Nutrition Impact Symptoms:  [] Nausea  [] Reduced Appetite - improved since leaving the hospital; enjoys the food at [facility] [] Acid Reflux    [] Vomiting - occasional dry heaves, pt is unsure why this happens [] Unintended Wt Loss  [] Malabsorption    [] Diarrhea  [] Unintended Wt Gain  [] Dumping Syndrome - denies but does avoid high sugar foods/fluids   [x] Constipation - @baseline: small BM every other day or two; states BM's are sometimes hard [] Thick Mucous/Secretions  [] Abdominal Pain    [] Dysgeusia (Altered Taste) [x] Xerostomia (Dry Mouth) - stable; plans to restart Biotene (did not bring to [facility]) [] Gas    [] Dysosmia (Altered Smell)  [] Thrush  [] Difficulty Chewing    [] Oral Mucositis (Sore Mouth)  [] Fatigue   [] Other:    [] Odynophagia   [] Esophagitis  [] Other:    [] Dysphagia - coughing while eating during hospitalizations; Reports this is now resolved  Seeing GI on 11/7/19   -EGD 9/25/19: "Findings consistent with previous gastric bypass surgery, no mucosal lesion, nor obstruction in the esophagus  Recommendations: Patient patient continues to have cough after eating, will plan for outpatient esophageal manometry " [] Early Satiety  [] No Problems Eating      Food Allergies: yes: had skin patch testing which showed allergy to strawberries, but is able to eat strawberries without any side effects  Food Intolerances: no    Current Diet: Regular Diet, No Restrictions; avoids high sugar foods/fluids as they trigger nausea since RNYGB  Current Nutrition Intake: Increased since last visit  Appetite: Good   Nutrition Route: PO  Meal planning/preparation mainly done by: mother does cooking; sister does shopping  Activity level: Working with OT and PT at [address] [place] [address]  Social Hx: 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  Sister lives in [place]  Brother lives in [place]  Now living at [facility] until IV abx are finished      24 Hr Diet Recall: has been eating less F/V since being at [facility]  Breakfast: bagel with cream cheese  Lunch: cream of mushroom soup today but is going out to eat after today's blood transfusion; yesterday: small amount of buttered noodles, carrots, Sicilian pizza  Dinner: bhaskary adonay BBQ on bun (ate 3/4), 3 onion rings; sometimes has peaches or pears  HS snack: 1 snack bag of chips; sometimes has hard candy during the day    Beverages: water (16 oz x4-5),  unsweetened iced tea with Splenda (16 oz a couple times per week), hot tea (occasionally); does not drink coffee or alcohol  Does not separate fluids from solids, does not have discomfort while eating and drinking at the same time  Supplements: States that she did not drink supplements provided in the hospital   Will drink [place] [employer] Breakfast prn  Will eat protein bars  Will likely restart supplements once chemo starts again (samples, coupons, shake recipes, and a cookbook provided today)   None   Dislikes      Estimated Nutrition Needs: (based on 55 1kg/ 121 3# adjusted wt)    Energy Needs: 5258-1019 kcal/day (30-35 kcal/kg)  Protein Needs:  grams/day (1 5-2 g/kg)  Fluid Needs: 1257-8974 mL/day (1 mL/kcal) - 55-64 oz    Discussion/Summary: Aruna Rick is here today for a blood transfusion and RD follow up  She has had two hospitalizations since last visit and is now living at [facility] until her IV antibiotics are finished  Her weight is stable since last visit  She reports that she was not eating well while in the hospital due to the fact that she did not care for the food provided  Her family has been supportive in providing meals/snacks per her request   He appetite is currently good  Her chemotherapy is currently on hold due to IV antibiotics and she says that it will be restarted once they are finished  We discussed the importance of continuing to eat and hydrate well and beginning to eat more or use oral nutrition supplements starting the week before chemotherapy resumes    We discussed incorporating more healthy snacks, fruits, and vegetables into her eating pattern  She was provided with a cookbook for meal ideas and recipes for homemade shakes/smoothies  Discussed/Reviewed:   · the importance of weight maintenance during CA tx  · indications & use of oral nutrition supplements Start using [place] [employer] Breakfast or homemade shakes/smoothies 1 week before chemo starts again  · how to modify foods for anticipated nutrition impact symptoms pt may experience during CA tx  · high protein foods to include at all meals and snacks    · ways to increase overall calorie intake    · encouraged eating every 2-3 hours (5-6 small meals/day)  · adequate hydation & tips to increase overall fluid intake    · MNT for: RNY Gastric Bypass, xerostomia, constipation  · recipe suggestions/resources  · a cancer preventative eating pattern as a long-term nutrition goal  - add more healthy snacks, fruits, and vegetables into eating pattern  Look through cookbook provided for meal ideas  All questions and concerns addressed during todays visit  Maritza Stevenr has RD contact information  Nutrition Diagnosis:  · Increased Nutrient Needs (kcal & pro) related to increased demand for nutrients and disease state as evidenced by cancer dx and pt undergoing tx for cancer       Intervention & Recommendations:  Topics addressed: Nutrition education, Balance/Variety, Meals & Snacks, Meal planning, Choosing high protein meals/snacks, Meal pattern: eating small/frequent meals (every 2-3 hours), Nutrition Symptom Management, Adequate Hydration, Medical Food Supplements, Nutrition-Related Medication Management, Vitamin & Mineral Supplements and Weight Management    Barriers: None  Readiness to change: action  Comprehension: verbalizes understanding  Expected Compliance: good    Materials Provided: Cookbooks: What to Eat During Cancer Treatment, [place] [employer] Breakfast samples and coupons and Oncology Milkshake and Smoothie Recipes      Monitoring & Evaluation:  Dietitian to Monitor: Food and Nutrition Intake, Nutrtion Impact Symptoms, Body Weight, Vitamin/Mineral Intake and Biochemical Data     Goals:  · weight stabilization  · adequate nutrition related symptom management  · pt to meet >/=75% estimated nutrition needs daily    · Progress Towards Goals: Progressing     Nutrition Rx & Recommendations:  · Diet: High Calorie, High Protein (for high calorie foods see pages 52-53, and for high protein foods see pages 49-51 in your Eating Hints book)  · Nutrition Supplements: Start using [place] [employer] Breakfast or homemade shakes/smoothies 1 week before chemo starts again     May use homemade shakes/smoothies as desired  If using a pre-made shake/bar, choose ones with >300 calories and >10 grams protein (ex  Ensure Enlive, Ensure Plus, Boost Plus, Boost Very High Calorie, etc )  · Small, frequent meals/snacks may be easier to tolerate than 3 large daily meals  Aim for 5-6 small meals per day (every 2-3 hours)  · Include protein at all meals/snacks  · Stay hydrated by sipping fluids of choice/tolerance throughout the day  · Refer to Eating Hints book for other meal/snack ideas and symptom management  · For dry mouth: sip water throughout the day; try very sweet (or tart, as tolerated) drinks; chew gum or suck on hard candy, popsicles, & ice chips; eat easy to swallow foods (such as pureed cooked foods or soups); moisten food with sauce/gravy/salad dressing; do not drink beer, wine, or any type of alcohol; keep lips moist with lip balm; avoid tobacco products & second-hand smoke; try Biotene as needed (see pages 17-18 in your Eating Hints book)  Follow proper oral care; Try baking soda/salt water rinse recipe (mix 3/4 tsp salt + 1 tsp baking soda + 1 qt water; rinse with pain water after using) in Eating Hints book (pg 18)  Brush your teeth before/after meals & before bed  · Weigh yourself regularly   If you notice weight loss, make an effort to increase your daily food/calorie intake  If you continue to notice loss after these efforts, reach out to your dietitian to establish a plan to stabilize weight  · High protein foods to try: cheese sticks, pudding, peanut butter, nuts, trail mix, rolled up chicken/turkey/ham, hummus (see pages 49-51 in your Eating Hints book)   · Make sure to eat slowly and chew thoroughly    · Lifetime supplements recommended for Gastric Bypass:  · Multivitamin BID  · Iron daily  · Vitamin D daily  · Calcium Citrate 500 mg TID (do not take with iron, needs to be 2 hrs apart)  · B12 1000 mcg daily    Follow Up Plan: 11/11/19 during chemo  Recommend Referral to Other Providers: none at this time

## 2019-10-17 ENCOUNTER — OFFICE VISIT (OUTPATIENT)
Dept: GYNECOLOGIC ONCOLOGY | Facility: CLINIC | Age: 64
End: 2019-10-17
Payer: COMMERCIAL

## 2019-10-17 VITALS
BODY MASS INDEX: 40.32 KG/M2 | DIASTOLIC BLOOD PRESSURE: 68 MMHG | HEART RATE: 96 BPM | WEIGHT: 200 LBS | RESPIRATION RATE: 16 BRPM | SYSTOLIC BLOOD PRESSURE: 128 MMHG | HEIGHT: 59 IN

## 2019-10-17 DIAGNOSIS — C54.1 ENDOMETRIAL CANCER (HCC): ICD-10-CM

## 2019-10-17 DIAGNOSIS — D50.8 OTHER IRON DEFICIENCY ANEMIA: ICD-10-CM

## 2019-10-17 DIAGNOSIS — C54.1 ENDOMETRIAL CANCER (HCC): Chronic | ICD-10-CM

## 2019-10-17 DIAGNOSIS — B95.61 MSSA BACTEREMIA: ICD-10-CM

## 2019-10-17 DIAGNOSIS — R78.81 MSSA BACTEREMIA: ICD-10-CM

## 2019-10-17 DIAGNOSIS — N13.39 OTHER HYDRONEPHROSIS: ICD-10-CM

## 2019-10-17 DIAGNOSIS — I82.4Y1 ACUTE DEEP VEIN THROMBOSIS (DVT) OF PROXIMAL VEIN OF RIGHT LOWER EXTREMITY (HCC): Primary | ICD-10-CM

## 2019-10-17 DIAGNOSIS — C54.1 ENDOMETRIAL CANCER (HCC): Primary | Chronic | ICD-10-CM

## 2019-10-17 PROBLEM — M79.89 SWELLING OF LEFT UPPER EXTREMITY: Status: RESOLVED | Noted: 2019-09-30 | Resolved: 2019-10-17

## 2019-10-17 PROBLEM — N17.9 ACUTE KIDNEY INJURY (HCC): Status: RESOLVED | Noted: 2019-09-19 | Resolved: 2019-10-17

## 2019-10-17 PROBLEM — K59.03 THERAPEUTIC OPIOID INDUCED CONSTIPATION: Status: RESOLVED | Noted: 2019-07-19 | Resolved: 2019-10-17

## 2019-10-17 PROBLEM — T40.2X5A THERAPEUTIC OPIOID INDUCED CONSTIPATION: Status: RESOLVED | Noted: 2019-07-19 | Resolved: 2019-10-17

## 2019-10-17 PROBLEM — A41.9 SEPSIS (HCC): Status: RESOLVED | Noted: 2019-09-19 | Resolved: 2019-10-17

## 2019-10-17 PROBLEM — J96.00 ACUTE RESPIRATORY FAILURE (HCC): Status: RESOLVED | Noted: 2019-09-20 | Resolved: 2019-10-17

## 2019-10-17 PROCEDURE — 99214 OFFICE O/P EST MOD 30 MIN: CPT | Performed by: OBSTETRICS & GYNECOLOGY

## 2019-10-17 RX ORDER — ACETAMINOPHEN 325 MG/1
650 TABLET ORAL ONCE
Status: CANCELLED | OUTPATIENT
Start: 2019-10-21

## 2019-10-17 RX ORDER — DIPHENHYDRAMINE HCL 25 MG
25 TABLET ORAL ONCE
Status: CANCELLED | OUTPATIENT
Start: 2019-10-21

## 2019-10-17 RX ORDER — SODIUM CHLORIDE 9 MG/ML
20 INJECTION, SOLUTION INTRAVENOUS ONCE
Status: CANCELLED | OUTPATIENT
Start: 2019-10-21

## 2019-10-17 NOTE — LETTER
Date: 10/17/2019       To whom it may concern: This is to certify that Ayanna Vincent has been under my care for the following diagnosis: Endometrial Cancer  - currently under going treatment  I feel that she is unable to serve on Jury Duty at this time for the above mentioned medical reasons                    Sincerely,  Marilin Hernandez MD

## 2019-10-17 NOTE — PROGRESS NOTES
Assessment/Plan:    Problem List Items Addressed This Visit        Cardiovascular and Mediastinum    Acute deep vein thrombosis (DVT) of proximal vein of lower extremity (HCC) - Primary     On therapeutic anticoagulation with weight based Lovenox  Will need anticoagulation for life  Genitourinary    Endometrial cancer ([facility] [address] ) (Chronic)     CT scan after 3 cycles of chemotherapy demonstrated objective response  She is due for 3 additional cycles of chemo  However, next cycle was scheduled for next week and she is still receiving intravenous antibiotics for staphylococcal is sepsis  Plan to delay chemotherapy until after November 4th when she received her last dose of antibiotics  Will see her after that for pre chemotherapy clearance  I anticipate decreasing doses for subsequent chemotherapy cycles to carboplatin AUC5 and paclitaxel 135 mg/m2 given bone marrow toxicity (anemia), fatigue, etc          Other hydronephrosis     Percutaneous nephrostomy in place  Working well  Exchanges per Interventional Radiology  Other    Anemia     Multifactorial due to chemotherapy, chronic disease, recent sepsis  Hemoglobin stable at 8  In anticipation of next cycle of chemotherapy, she would benefit from blood transfusion  Plan for 2 units packed blood  cells prior to next chemotherapy infusion  MSSA bacteremia     On Ancef 3 times a day via PICC line at extended care facility  This will be completed November 4, 2019  CHIEF COMPLAINT:   Follow-up after recent hospital admission  G positive bacteremia, recovering after port removal on IV antibiotics for 6 weeks  Deconditioning, at extended care facility      Problem:  Cancer Staging  Endometrial cancer [facility])  Staging form: Corpus Uteri - Carcinoma, AJCC 7th Edition  - Clinical stage from 12/19/2017: FIGO Stage IB (T1b, N0, M0) - Signed by Andre Guido MD on 2/12/2018        Previous therapy:     Endometrial cancer ([facility] [address] ) 11/17/2017 Initial Diagnosis     Endometrial cancer ([facility] [address] )      12/19/2017 Surgery     Robotic assisted total laparoscopic hysterectomy with bilateral salpingo-oophorectomy and sentinel bilateral pelvic lymph node dissection  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 (4 4 x 3 2 cm tumor, 9 4/15 4mm invasion, NO LVSI, washings revealed atypical cellular changes)      12/19/2017 Genetic Testing     Morrison testing negative      7/8/2019 Recurrence     Presented with right lower extremity DVT and CT demonstrating right pelvic sidewall mass with venous, ureteral and nerve compression causing significant neuropathic pain  Core biopsy demonstrates high-grade carcinoma  7/30/2019 -  Chemotherapy     pegfilgrastim (NEULASTA ONPRO) subcutaneous injection kit 6 mg, 6 mg, Subcutaneous, Once, 2 of 4 cycles  Administration: 6 mg (9/9/2019)  CARBOplatin (PARAPLATIN) 574 2 mg in sodium chloride 0 9 % 250 mL IVPB, 574 2 mg, Intravenous, Once, 4 of 6 cycles  Administration: 574 2 mg (7/30/2019), 553 2 mg (8/19/2019), 678 6 mg (9/9/2019)  PACLitaxel (TAXOL) 337 8 mg in sodium chloride 0 9 % 500 mL chemo IVPB, 175 mg/m2 = 337 8 mg, Intravenous, Once, 4 of 6 cycles  Dose modification: 135 mg/m2 (original dose 175 mg/m2, Cycle 4, Reason: Other (See Comments), Comment: prior hospital admission/complications)  Administration: 337 8 mg (7/30/2019), 327 mg (8/19/2019), 327 mg (9/9/2019)           Patient ID: Isaeblle Wilson is a 60 y o  female  HPI  Patient presents for follow-up  She was recently discharge after prolonged hospital admission for sepsis due to infected port, upper extremity blood clot  She developed significant deconditioning is now an extended care facility  She is receiving Ancef 3 times a day for a total of 6 weeks  Due to complete on November 4, 2019  Energy and stamina improving  She is ambulating with a walker  Nephrostomy in place  Working well    The following portions of the patient's history were reviewed and updated as appropriate: allergies, current medications, past family history, past medical history, past social history, past surgical history and problem list     Review of Systems  As above  Ongoing alopecia  Otherwise 12 point review of systems noncontributory    Current Outpatient Medications   Medication Sig Dispense Refill    acetaminophen (TYLENOL) 325 mg tablet Take 2 tablets (650 mg total) by mouth every 6 (six) hours as needed for mild pain, headaches or fever 30 tablet 0    ARIPiprazole (ABILIFY) 10 mg tablet Take 10 mg by mouth daily      aspirin (ECOTRIN LOW STRENGTH) 81 mg EC tablet Take 81 mg by mouth daily      benzonatate (TESSALON) 200 MG capsule Take 1 capsule (200 mg total) by mouth 3 (three) times a day as needed for cough 20 capsule 0    ceFAZolin (ANCEF) 2000 mg IVPB Infuse 2,000 mg into a venous catheter every 8 (eight) hours  0    cholecalciferol (VITAMIN D3) 1,000 units tablet Take 1,000 Units by mouth daily      enoxaparin (LOVENOX) 100 mg/mL Inject 0 9 mL (90 mg total) under the skin every 12 (twelve) hours 54 mL 0    folic acid (FOLVITE) 1 mg tablet Take 1 tablet (1 mg total) by mouth daily  0    gemfibrozil (LOPID) 600 mg tablet TAKE 1 TABLET BY MOUTH DAILY 90 tablet 1    lisinopril (ZESTRIL) 5 mg tablet Take 1 tablet (5 mg total) by mouth daily  0    LORazepam (ATIVAN) 1 mg tablet Take 1 tablet (1 mg total) by mouth every 6 (six) hours as needed for anxiety (or nausea) for up to 10 days 10 tablet 0    MYRBETRIQ 50 MG TB24 TAKE 1 TABLET DAILY 30 tablet 10    omeprazole (PriLOSEC) 20 mg delayed release capsule Take 20 mg by mouth daily      ondansetron (ZOFRAN) 8 mg tablet Take 1 tablet (8 mg total) by mouth every 8 (eight) hours as needed for nausea or vomiting 30 tablet 1    saccharomyces boulardii (FLORASTOR) 250 mg capsule Take 1 capsule (250 mg total) by mouth 2 (two) times a day  0    venlafaxine (EFFEXOR) 75 mg tablet Take 75 mg by mouth 3 (three) times a day        CRANIAL PROSTHESIS, RX, Please provide patient with extracranial prosthesis while undergoing active chemotherapy treatment  (Patient not taking: Reported on 10/15/2019) 1 Piece 0     No current facility-administered medications for this visit  Objective:    Blood pressure 128/68, pulse 96, resp  rate 16, height 4' 11" (1 499 m), weight 90 7 kg (200 lb), not currently breastfeeding  Body mass index is 40 4 kg/m²  Body surface area is 1 84 meters squared  Physical Exam   Constitutional: She is oriented to person, place, and time  She appears well-developed  Obese  HENT:   Head: Normocephalic and atraumatic  Mouth/Throat: No oropharyngeal exudate  Eyes: Conjunctivae are normal  Right eye exhibits no discharge  Left eye exhibits no discharge  No scleral icterus  Neck: Normal range of motion  Neck supple  No tracheal deviation present  No thyromegaly present  Cardiovascular: Normal rate, regular rhythm and normal heart sounds  Exam reveals no gallop  No murmur heard  Pulmonary/Chest: Effort normal and breath sounds normal  No respiratory distress  She has no wheezes  She has no rhonchi  She has no rales  Abdominal: Soft  There is no tenderness  Hernia confirmed negative in the right inguinal area and confirmed negative in the left inguinal area  Right-sided nephrostomy tube in place, pink tinged urine  Genitourinary: There is no rash or lesion on the right labia  There is no rash or lesion on the left labia  Uterus is not enlarged and not tender  Cervix exhibits no motion tenderness, no discharge and no friability  Right adnexum displays no mass, no tenderness and no fullness  Left adnexum displays no mass, no tenderness and no fullness  No bleeding in the vagina  Genitourinary Comments: Pelvic exam deferred  Musculoskeletal: She exhibits edema (Grade 3 right lower extremity edema is stable  )  She exhibits no tenderness     Lymphadenopathy:     She has no cervical adenopathy  Right: No inguinal adenopathy present  Left: No inguinal adenopathy present  Neurological: She is alert and oriented to person, place, and time  Skin: Skin is dry  Pale  Psychiatric: She has a normal mood and affect  Her behavior is normal  Judgment and thought content normal    Vitals reviewed        Leslee German MD, [address] [place] [address]  10/18/2019  11:00 AM

## 2019-10-17 NOTE — ASSESSMENT & PLAN NOTE
On Ancef 3 times a day via PICC line at [facility]  This will be completed November 4, 2019

## 2019-10-17 NOTE — ASSESSMENT & PLAN NOTE
Multifactorial due to chemotherapy, chronic disease, recent sepsis  Hemoglobin stable at 8  In anticipation of next cycle of chemotherapy, she would benefit from blood transfusion  Plan for 2 units packed blood  cells prior to next chemotherapy infusion

## 2019-10-17 NOTE — ASSESSMENT & PLAN NOTE
CT scan after 3 cycles of chemotherapy demonstrated objective response  She is due for 3 additional cycles of chemo  However, next cycle was scheduled for next week and she is still receiving intravenous antibiotics for staphylococcal is sepsis  Plan to delay chemotherapy until after November 4th when she received her last dose of antibiotics  Will see her after that for pre chemotherapy clearance   I anticipate decreasing doses for subsequent chemotherapy cycles to carboplatin AUC5 and paclitaxel 135 mg/m2 given bone marrow toxicity (anemia), fatigue, etc

## 2019-10-18 ENCOUNTER — HOSPITAL ENCOUNTER (OUTPATIENT)
Dept: INFUSION CENTER | Facility: CLINIC | Age: 64
Discharge: HOME | End: 2019-10-18
Payer: COMMERCIAL

## 2019-10-18 ENCOUNTER — DOCUMENTATION (OUTPATIENT)
Dept: NUTRITION | Facility: CLINIC | Age: 64
End: 2019-10-18

## 2019-10-18 DIAGNOSIS — C54.1 ENDOMETRIAL CANCER (HCC): Primary | Chronic | ICD-10-CM

## 2019-10-18 DIAGNOSIS — Z45.2 ENCOUNTER FOR CENTRAL LINE CARE: ICD-10-CM

## 2019-10-18 DIAGNOSIS — D50.8 OTHER IRON DEFICIENCY ANEMIA: ICD-10-CM

## 2019-10-18 PROBLEM — E83.42 HYPOMAGNESEMIA: Status: ACTIVE | Noted: 2019-10-18

## 2019-10-18 LAB
ABO GROUP BLD: NORMAL
ALBUMIN SERPL BCP-MCNC: 2.3 G/DL (ref 3.5–5)
ALP SERPL-CCNC: 94 U/L (ref 46–116)
ALT SERPL W P-5'-P-CCNC: 8 U/L (ref 12–78)
ANION GAP SERPL CALCULATED.3IONS-SCNC: 10 MMOL/L (ref 4–13)
AST SERPL W P-5'-P-CCNC: 27 U/L (ref 5–45)
BASOPHILS # BLD AUTO: 0.02 THOUSANDS/ΜL (ref 0–0.1)
BASOPHILS NFR BLD AUTO: 0 % (ref 0–1)
BILIRUB SERPL-MCNC: 0.4 MG/DL (ref 0.2–1)
BLD GP AB SCN SERPL QL: NEGATIVE
BUN SERPL-MCNC: 7 MG/DL (ref 5–25)
CALCIUM SERPL-MCNC: 7.4 MG/DL (ref 8.3–10.1)
CHLORIDE SERPL-SCNC: 106 MMOL/L (ref 100–108)
CO2 SERPL-SCNC: 25 MMOL/L (ref 21–32)
CREAT SERPL-MCNC: 0.56 MG/DL (ref 0.6–1.3)
EOSINOPHIL # BLD AUTO: 0.26 THOUSAND/ΜL (ref 0–0.61)
EOSINOPHIL NFR BLD AUTO: 5 % (ref 0–6)
ERYTHROCYTE [DISTWIDTH] IN BLOOD BY AUTOMATED COUNT: 17.8 % (ref 11.6–15.1)
GFR SERPL CREATININE-BSD FRML MDRD: 100 ML/MIN/1.73SQ M
GLUCOSE SERPL-MCNC: 80 MG/DL (ref 65–140)
HCT VFR BLD AUTO: 24.4 % (ref 34.8–46.1)
HGB BLD-MCNC: 7.5 G/DL (ref 11.5–15.4)
IMM GRANULOCYTES # BLD AUTO: 0.03 THOUSAND/UL (ref 0–0.2)
IMM GRANULOCYTES NFR BLD AUTO: 1 % (ref 0–2)
LYMPHOCYTES # BLD AUTO: 1.89 THOUSANDS/ΜL (ref 0.6–4.47)
LYMPHOCYTES NFR BLD AUTO: 34 % (ref 14–44)
MAGNESIUM SERPL-MCNC: 1.2 MG/DL (ref 1.6–2.6)
MCH RBC QN AUTO: 29 PG (ref 26.8–34.3)
MCHC RBC AUTO-ENTMCNC: 30.7 G/DL (ref 31.4–37.4)
MCV RBC AUTO: 94 FL (ref 82–98)
MONOCYTES # BLD AUTO: 0.36 THOUSAND/ΜL (ref 0.17–1.22)
MONOCYTES NFR BLD AUTO: 6 % (ref 4–12)
NEUTROPHILS # BLD AUTO: 3.06 THOUSANDS/ΜL (ref 1.85–7.62)
NEUTS SEG NFR BLD AUTO: 54 % (ref 43–75)
NRBC BLD AUTO-RTO: 0 /100 WBCS
PLATELET # BLD AUTO: 151 THOUSANDS/UL (ref 149–390)
PMV BLD AUTO: 10.7 FL (ref 8.9–12.7)
POTASSIUM SERPL-SCNC: 3.8 MMOL/L (ref 3.5–5.3)
PROT SERPL-MCNC: 6.9 G/DL (ref 6.4–8.2)
RBC # BLD AUTO: 2.59 MILLION/UL (ref 3.81–5.12)
RH BLD: POSITIVE
SODIUM SERPL-SCNC: 141 MMOL/L (ref 136–145)
SPECIMEN EXPIRATION DATE: NORMAL
WBC # BLD AUTO: 5.62 THOUSAND/UL (ref 4.31–10.16)

## 2019-10-18 PROCEDURE — 86901 BLOOD TYPING SEROLOGIC RH(D): CPT | Performed by: OBSTETRICS & GYNECOLOGY

## 2019-10-18 PROCEDURE — 85025 COMPLETE CBC W/AUTO DIFF WBC: CPT

## 2019-10-18 PROCEDURE — 86900 BLOOD TYPING SEROLOGIC ABO: CPT | Performed by: OBSTETRICS & GYNECOLOGY

## 2019-10-18 PROCEDURE — 86850 RBC ANTIBODY SCREEN: CPT | Performed by: OBSTETRICS & GYNECOLOGY

## 2019-10-18 PROCEDURE — 80053 COMPREHEN METABOLIC PANEL: CPT

## 2019-10-18 PROCEDURE — 86920 COMPATIBILITY TEST SPIN: CPT

## 2019-10-18 PROCEDURE — 83735 ASSAY OF MAGNESIUM: CPT

## 2019-10-18 RX ORDER — MAGNESIUM SULFATE HEPTAHYDRATE 40 MG/ML
2 INJECTION, SOLUTION INTRAVENOUS ONCE
Status: CANCELLED | OUTPATIENT
Start: 2019-10-21

## 2019-10-18 NOTE — PROGRESS NOTES
Brief visit with Maritza Fowler today during her labs as it has been some time since our last RD visit due to hospitalizations  Reviewed tentative follow up plan for Monday, 10/21  She is now residing at [facility]  She reports that she has been maintaining her weight pretty well considering all that she has recently been through  She states that she is eating well but needs to make sure to eat small portions at meals  Per infusion staff, pt's tx plan and length of appointment may change on Monday and RD visit may no longer be possible due to scheduling conflicts  If we cannot meet on Monday, Maritza Fowler would like to follow up at a later date when she is here again  She is aware to contact me with all nutrition-related questions in the meantime

## 2019-10-21 ENCOUNTER — NUTRITION (OUTPATIENT)
Dept: NUTRITION | Facility: CLINIC | Age: 64
End: 2019-10-21

## 2019-10-21 ENCOUNTER — HOSPITAL ENCOUNTER (OUTPATIENT)
Dept: INFUSION CENTER | Facility: CLINIC | Age: 64
Discharge: HOME | End: 2019-10-21
Payer: COMMERCIAL

## 2019-10-21 VITALS
RESPIRATION RATE: 16 BRPM | TEMPERATURE: 98.9 F | SYSTOLIC BLOOD PRESSURE: 98 MMHG | DIASTOLIC BLOOD PRESSURE: 68 MMHG | HEART RATE: 72 BPM

## 2019-10-21 DIAGNOSIS — D50.8 OTHER IRON DEFICIENCY ANEMIA: ICD-10-CM

## 2019-10-21 DIAGNOSIS — Z71.3 NUTRITIONAL COUNSELING: Primary | ICD-10-CM

## 2019-10-21 DIAGNOSIS — E83.42 HYPOMAGNESEMIA: Primary | ICD-10-CM

## 2019-10-21 PROCEDURE — P9040 RBC LEUKOREDUCED IRRADIATED: HCPCS

## 2019-10-21 PROCEDURE — 96365 THER/PROPH/DIAG IV INF INIT: CPT

## 2019-10-21 PROCEDURE — 36430 TRANSFUSION BLD/BLD COMPNT: CPT

## 2019-10-21 RX ORDER — DIPHENHYDRAMINE HCL 25 MG
25 TABLET ORAL ONCE
Status: CANCELLED | OUTPATIENT
Start: 2019-10-21

## 2019-10-21 RX ORDER — ACETAMINOPHEN 325 MG/1
650 TABLET ORAL ONCE
Status: CANCELLED | OUTPATIENT
Start: 2019-10-21

## 2019-10-21 RX ORDER — SODIUM CHLORIDE 9 MG/ML
20 INJECTION, SOLUTION INTRAVENOUS ONCE
Status: CANCELLED | OUTPATIENT
Start: 2019-10-21

## 2019-10-21 RX ORDER — DIPHENHYDRAMINE HCL 25 MG
25 TABLET ORAL ONCE
Status: COMPLETED | OUTPATIENT
Start: 2019-10-21 | End: 2019-10-21

## 2019-10-21 RX ORDER — SODIUM CHLORIDE 9 MG/ML
20 INJECTION, SOLUTION INTRAVENOUS ONCE
Status: COMPLETED | OUTPATIENT
Start: 2019-10-21 | End: 2019-10-21

## 2019-10-21 RX ORDER — MAGNESIUM SULFATE HEPTAHYDRATE 40 MG/ML
2 INJECTION, SOLUTION INTRAVENOUS ONCE
Status: COMPLETED | OUTPATIENT
Start: 2019-10-21 | End: 2019-10-21

## 2019-10-21 RX ORDER — MAGNESIUM SULFATE HEPTAHYDRATE 40 MG/ML
2 INJECTION, SOLUTION INTRAVENOUS ONCE
Status: CANCELLED | OUTPATIENT
Start: 2019-10-21

## 2019-10-21 RX ORDER — ACETAMINOPHEN 325 MG/1
650 TABLET ORAL ONCE
Status: COMPLETED | OUTPATIENT
Start: 2019-10-21 | End: 2019-10-21

## 2019-10-21 RX ADMIN — MAGNESIUM SULFATE HEPTAHYDRATE 2 G: 40 INJECTION, SOLUTION INTRAVENOUS at 08:07

## 2019-10-21 RX ADMIN — DIPHENHYDRAMINE HCL 25 MG: 25 TABLET ORAL at 09:00

## 2019-10-21 RX ADMIN — ACETAMINOPHEN 650 MG: 325 TABLET, FILM COATED ORAL at 09:00

## 2019-10-21 RX ADMIN — SODIUM CHLORIDE 20 ML/HR: 0.9 INJECTION, SOLUTION INTRAVENOUS at 08:15

## 2019-10-21 NOTE — PLAN OF CARE
Problem: Potential for Falls  Goal: Patient will remain free of falls  Description  INTERVENTIONS:  - Assess patient frequently for physical needs  -  Identify cognitive and physical deficits and behaviors that affect risk of falls    -  [place] fall precautions as indicated by assessment   - Educate patient/family on patient safety including physical limitations  - Instruct patient to call for assistance with activity based on assessment  - Modify environment to reduce risk of injury  - Consider OT/PT consult to assist with strengthening/mobility  Outcome: Progressing

## 2019-10-21 NOTE — PATIENT INSTRUCTIONS
Nutrition Rx & Recommendations:  · Diet: High Calorie, High Protein (for high calorie foods see pages 52-53, and for high protein foods see pages 49-51 in your Eating Hints book)  · Nutrition Supplements: Start using [employer] Breakfast or homemade shakes/smoothies 1 week before chemo starts again     May use homemade shakes/smoothies as desired  If using a pre-made shake/bar, choose ones with >300 calories and >10 grams protein (ex  Ensure Enlive, Ensure Plus, Boost Plus, Boost Very High Calorie, etc )  · Small, frequent meals/snacks may be easier to tolerate than 3 large daily meals  Aim for 5-6 small meals per day (every 2-3 hours)  · Include protein at all meals/snacks  · Stay hydrated by sipping fluids of choice/tolerance throughout the day  · Refer to Eating Hints book for other meal/snack ideas and symptom management  · For dry mouth: sip water throughout the day; try very sweet (or tart, as tolerated) drinks; chew gum or suck on hard candy, popsicles, & ice chips; eat easy to swallow foods (such as pureed cooked foods or soups); moisten food with sauce/gravy/salad dressing; do not drink beer, wine, or any type of alcohol; keep lips moist with lip balm; avoid tobacco products & second-hand smoke; try Biotene as needed (see pages 17-18 in your Eating Hints book)  Follow proper oral care; Try baking soda/salt water rinse recipe (mix 3/4 tsp salt + 1 tsp baking soda + 1 qt water; rinse with pain water after using) in Eating Hints book (pg 18)  Brush your teeth before/after meals & before bed  · Weigh yourself regularly  If you notice weight loss, make an effort to increase your daily food/calorie intake  If you continue to notice loss after these efforts, reach out to your dietitian to establish a plan to stabilize weight    · High protein foods to try: cheese sticks, pudding, peanut butter, nuts, trail mix, rolled up chicken/turkey/ham, hummus (see pages 49-51 in your Eating Hints book) · Make sure to eat slowly and chew thoroughly    · Lifetime supplements recommended for Gastric Bypass:  · Multivitamin BID  · Iron daily  · Vitamin D daily  · Calcium Citrate 500 mg TID (do not take with iron, needs to be 2 hrs apart)  · B12 1000 mcg daily    Follow Up Plan: 11/11/19 during chemo  Recommend Referral to Other Providers: none at this time

## 2019-10-22 ENCOUNTER — HOSPITAL ENCOUNTER (OUTPATIENT)
Facility: HOSPITAL | Age: 64
Setting detail: OBSERVATION
Discharge: NURSING, REHAB OR LONG-TERM CARE | End: 2019-10-22
Attending: EMERGENCY MEDICINE | Admitting: FAMILY MEDICINE
Payer: COMMERCIAL

## 2019-10-22 ENCOUNTER — APPOINTMENT (EMERGENCY)
Dept: CT IMAGING | Facility: HOSPITAL | Age: 64
End: 2019-10-22
Payer: COMMERCIAL

## 2019-10-22 ENCOUNTER — APPOINTMENT (EMERGENCY)
Dept: RADIOLOGY | Facility: HOSPITAL | Age: 64
End: 2019-10-22
Payer: COMMERCIAL

## 2019-10-22 ENCOUNTER — TELEPHONE (OUTPATIENT)
Dept: OTHER | Facility: OTHER | Age: 64
End: 2019-10-22

## 2019-10-22 VITALS
BODY MASS INDEX: 41.01 KG/M2 | DIASTOLIC BLOOD PRESSURE: 94 MMHG | OXYGEN SATURATION: 96 % | RESPIRATION RATE: 18 BRPM | WEIGHT: 203.04 LBS | SYSTOLIC BLOOD PRESSURE: 177 MMHG | TEMPERATURE: 97.7 F | HEART RATE: 84 BPM

## 2019-10-22 DIAGNOSIS — E87.6 HYPOKALEMIA: ICD-10-CM

## 2019-10-22 DIAGNOSIS — R10.9 RIGHT FLANK PAIN: ICD-10-CM

## 2019-10-22 DIAGNOSIS — I95.9 HYPOTENSION: ICD-10-CM

## 2019-10-22 DIAGNOSIS — R31.9 HEMATURIA: Primary | ICD-10-CM

## 2019-10-22 PROBLEM — R31.0 GROSS HEMATURIA: Status: ACTIVE | Noted: 2019-10-22

## 2019-10-22 LAB
ABO GROUP BLD BPU: NORMAL
ABO GROUP BLD BPU: NORMAL
ABO GROUP BLD: NORMAL
ALBUMIN SERPL BCP-MCNC: 2.4 G/DL (ref 3.5–5)
ALP SERPL-CCNC: 92 U/L (ref 46–116)
ALT SERPL W P-5'-P-CCNC: 6 U/L (ref 12–78)
ANION GAP SERPL CALCULATED.3IONS-SCNC: 8 MMOL/L (ref 4–13)
ANION GAP SERPL CALCULATED.3IONS-SCNC: 9 MMOL/L (ref 4–13)
APTT PPP: 41 SECONDS (ref 23–37)
AST SERPL W P-5'-P-CCNC: 16 U/L (ref 5–45)
ATRIAL RATE: 76 BPM
BACTERIA UR QL AUTO: ABNORMAL /HPF
BASOPHILS # BLD AUTO: 0.02 THOUSANDS/ΜL (ref 0–0.1)
BASOPHILS # BLD AUTO: 0.02 THOUSANDS/ΜL (ref 0–0.1)
BASOPHILS NFR BLD AUTO: 0 % (ref 0–1)
BASOPHILS NFR BLD AUTO: 0 % (ref 0–1)
BILIRUB SERPL-MCNC: 0.2 MG/DL (ref 0.2–1)
BILIRUB UR QL STRIP: NEGATIVE
BLD GP AB SCN SERPL QL: NEGATIVE
BPU ID: NORMAL
BPU ID: NORMAL
BUN SERPL-MCNC: 5 MG/DL (ref 5–25)
BUN SERPL-MCNC: 7 MG/DL (ref 5–25)
CALCIUM SERPL-MCNC: 8.4 MG/DL (ref 8.3–10.1)
CALCIUM SERPL-MCNC: 8.5 MG/DL (ref 8.3–10.1)
CHLORIDE SERPL-SCNC: 107 MMOL/L (ref 100–108)
CHLORIDE SERPL-SCNC: 110 MMOL/L (ref 100–108)
CK SERPL-CCNC: 41 U/L (ref 26–192)
CLARITY UR: ABNORMAL
CO2 SERPL-SCNC: 26 MMOL/L (ref 21–32)
CO2 SERPL-SCNC: 27 MMOL/L (ref 21–32)
COLOR UR: ABNORMAL
CREAT SERPL-MCNC: 0.53 MG/DL (ref 0.6–1.3)
CREAT SERPL-MCNC: 0.58 MG/DL (ref 0.6–1.3)
CROSSMATCH: NORMAL
CROSSMATCH: NORMAL
EOSINOPHIL # BLD AUTO: 0.23 THOUSAND/ΜL (ref 0–0.61)
EOSINOPHIL # BLD AUTO: 0.31 THOUSAND/ΜL (ref 0–0.61)
EOSINOPHIL NFR BLD AUTO: 5 % (ref 0–6)
EOSINOPHIL NFR BLD AUTO: 5 % (ref 0–6)
ERYTHROCYTE [DISTWIDTH] IN BLOOD BY AUTOMATED COUNT: 19.7 % (ref 11.6–15.1)
ERYTHROCYTE [DISTWIDTH] IN BLOOD BY AUTOMATED COUNT: 19.9 % (ref 11.6–15.1)
GFR SERPL CREATININE-BSD FRML MDRD: 102 ML/MIN/1.73SQ M
GFR SERPL CREATININE-BSD FRML MDRD: 99 ML/MIN/1.73SQ M
GLUCOSE SERPL-MCNC: 83 MG/DL (ref 65–140)
GLUCOSE SERPL-MCNC: 87 MG/DL (ref 65–140)
GLUCOSE UR STRIP-MCNC: NEGATIVE MG/DL
HCT VFR BLD AUTO: 29 % (ref 34.8–46.1)
HCT VFR BLD AUTO: 30.4 % (ref 34.8–46.1)
HGB BLD-MCNC: 9.2 G/DL (ref 11.5–15.4)
HGB BLD-MCNC: 9.7 G/DL (ref 11.5–15.4)
HGB UR QL STRIP.AUTO: ABNORMAL
IMM GRANULOCYTES # BLD AUTO: 0.01 THOUSAND/UL (ref 0–0.2)
IMM GRANULOCYTES # BLD AUTO: 0.02 THOUSAND/UL (ref 0–0.2)
IMM GRANULOCYTES NFR BLD AUTO: 0 % (ref 0–2)
IMM GRANULOCYTES NFR BLD AUTO: 0 % (ref 0–2)
INR PPP: 1.09 (ref 0.84–1.19)
KETONES UR STRIP-MCNC: NEGATIVE MG/DL
LACTATE SERPL-SCNC: 0.8 MMOL/L (ref 0.5–2)
LEUKOCYTE ESTERASE UR QL STRIP: ABNORMAL
LIPASE SERPL-CCNC: 206 U/L (ref 73–393)
LYMPHOCYTES # BLD AUTO: 1.51 THOUSANDS/ΜL (ref 0.6–4.47)
LYMPHOCYTES # BLD AUTO: 1.86 THOUSANDS/ΜL (ref 0.6–4.47)
LYMPHOCYTES NFR BLD AUTO: 27 % (ref 14–44)
LYMPHOCYTES NFR BLD AUTO: 30 % (ref 14–44)
MAGNESIUM SERPL-MCNC: 1.5 MG/DL (ref 1.6–2.6)
MCH RBC QN AUTO: 29.3 PG (ref 26.8–34.3)
MCH RBC QN AUTO: 29.3 PG (ref 26.8–34.3)
MCHC RBC AUTO-ENTMCNC: 31.7 G/DL (ref 31.4–37.4)
MCHC RBC AUTO-ENTMCNC: 31.9 G/DL (ref 31.4–37.4)
MCV RBC AUTO: 92 FL (ref 82–98)
MCV RBC AUTO: 92 FL (ref 82–98)
MONOCYTES # BLD AUTO: 0.39 THOUSAND/ΜL (ref 0.17–1.22)
MONOCYTES # BLD AUTO: 0.51 THOUSAND/ΜL (ref 0.17–1.22)
MONOCYTES NFR BLD AUTO: 7 % (ref 4–12)
MONOCYTES NFR BLD AUTO: 8 % (ref 4–12)
NEUTROPHILS # BLD AUTO: 2.95 THOUSANDS/ΜL (ref 1.85–7.62)
NEUTROPHILS # BLD AUTO: 4.13 THOUSANDS/ΜL (ref 1.85–7.62)
NEUTS SEG NFR BLD AUTO: 57 % (ref 43–75)
NEUTS SEG NFR BLD AUTO: 61 % (ref 43–75)
NITRITE UR QL STRIP: NEGATIVE
NON-SQ EPI CELLS URNS QL MICRO: ABNORMAL /HPF
NRBC BLD AUTO-RTO: 0 /100 WBCS
NRBC BLD AUTO-RTO: 0 /100 WBCS
P AXIS: 48 DEGREES
PH UR STRIP.AUTO: 7 [PH]
PLATELET # BLD AUTO: 211 THOUSANDS/UL (ref 149–390)
PLATELET # BLD AUTO: 212 THOUSANDS/UL (ref 149–390)
PMV BLD AUTO: 10.3 FL (ref 8.9–12.7)
PMV BLD AUTO: 10.9 FL (ref 8.9–12.7)
POTASSIUM SERPL-SCNC: 2.9 MMOL/L (ref 3.5–5.3)
POTASSIUM SERPL-SCNC: 3.4 MMOL/L (ref 3.5–5.3)
PR INTERVAL: 142 MS
PROT SERPL-MCNC: 7.1 G/DL (ref 6.4–8.2)
PROT UR STRIP-MCNC: ABNORMAL MG/DL
PROTHROMBIN TIME: 13.5 SECONDS (ref 11.6–14.5)
QRS AXIS: -17 DEGREES
QRSD INTERVAL: 88 MS
QT INTERVAL: 524 MS
QTC INTERVAL: 589 MS
RBC # BLD AUTO: 3.14 MILLION/UL (ref 3.81–5.12)
RBC # BLD AUTO: 3.31 MILLION/UL (ref 3.81–5.12)
RBC #/AREA URNS AUTO: ABNORMAL /HPF
RH BLD: POSITIVE
SODIUM SERPL-SCNC: 143 MMOL/L (ref 136–145)
SODIUM SERPL-SCNC: 144 MMOL/L (ref 136–145)
SP GR UR STRIP.AUTO: 1.01 (ref 1–1.03)
SPECIMEN EXPIRATION DATE: NORMAL
T WAVE AXIS: 84 DEGREES
UNIT DISPENSE STATUS: NORMAL
UNIT DISPENSE STATUS: NORMAL
UNIT PRODUCT CODE: NORMAL
UNIT PRODUCT CODE: NORMAL
UNIT RH: NORMAL
UNIT RH: NORMAL
UROBILINOGEN UR QL STRIP.AUTO: 0.2 E.U./DL
VENTRICULAR RATE: 76 BPM
WBC # BLD AUTO: 5.11 THOUSAND/UL (ref 4.31–10.16)
WBC # BLD AUTO: 6.85 THOUSAND/UL (ref 4.31–10.16)
WBC #/AREA URNS AUTO: ABNORMAL /HPF

## 2019-10-22 PROCEDURE — 83605 ASSAY OF LACTIC ACID: CPT | Performed by: EMERGENCY MEDICINE

## 2019-10-22 PROCEDURE — 82550 ASSAY OF CK (CPK): CPT | Performed by: EMERGENCY MEDICINE

## 2019-10-22 PROCEDURE — 83690 ASSAY OF LIPASE: CPT | Performed by: EMERGENCY MEDICINE

## 2019-10-22 PROCEDURE — 86900 BLOOD TYPING SEROLOGIC ABO: CPT | Performed by: EMERGENCY MEDICINE

## 2019-10-22 PROCEDURE — 83735 ASSAY OF MAGNESIUM: CPT | Performed by: PHYSICIAN ASSISTANT

## 2019-10-22 PROCEDURE — 87040 BLOOD CULTURE FOR BACTERIA: CPT | Performed by: EMERGENCY MEDICINE

## 2019-10-22 PROCEDURE — 85610 PROTHROMBIN TIME: CPT | Performed by: EMERGENCY MEDICINE

## 2019-10-22 PROCEDURE — 96361 HYDRATE IV INFUSION ADD-ON: CPT

## 2019-10-22 PROCEDURE — 85025 COMPLETE CBC W/AUTO DIFF WBC: CPT | Performed by: EMERGENCY MEDICINE

## 2019-10-22 PROCEDURE — 96365 THER/PROPH/DIAG IV INF INIT: CPT

## 2019-10-22 PROCEDURE — 99285 EMERGENCY DEPT VISIT HI MDM: CPT | Performed by: EMERGENCY MEDICINE

## 2019-10-22 PROCEDURE — 71045 X-RAY EXAM CHEST 1 VIEW: CPT

## 2019-10-22 PROCEDURE — 74178 CT ABD&PLV WO CNTR FLWD CNTR: CPT

## 2019-10-22 PROCEDURE — 85730 THROMBOPLASTIN TIME PARTIAL: CPT | Performed by: EMERGENCY MEDICINE

## 2019-10-22 PROCEDURE — 80053 COMPREHEN METABOLIC PANEL: CPT | Performed by: EMERGENCY MEDICINE

## 2019-10-22 PROCEDURE — 80048 BASIC METABOLIC PNL TOTAL CA: CPT | Performed by: PHYSICIAN ASSISTANT

## 2019-10-22 PROCEDURE — 86850 RBC ANTIBODY SCREEN: CPT | Performed by: EMERGENCY MEDICINE

## 2019-10-22 PROCEDURE — 86901 BLOOD TYPING SEROLOGIC RH(D): CPT | Performed by: EMERGENCY MEDICINE

## 2019-10-22 PROCEDURE — 93005 ELECTROCARDIOGRAM TRACING: CPT

## 2019-10-22 PROCEDURE — 99236 HOSP IP/OBS SAME DATE HI 85: CPT | Performed by: FAMILY MEDICINE

## 2019-10-22 PROCEDURE — 87086 URINE CULTURE/COLONY COUNT: CPT | Performed by: EMERGENCY MEDICINE

## 2019-10-22 PROCEDURE — 36415 COLL VENOUS BLD VENIPUNCTURE: CPT | Performed by: EMERGENCY MEDICINE

## 2019-10-22 PROCEDURE — 85025 COMPLETE CBC W/AUTO DIFF WBC: CPT | Performed by: PHYSICIAN ASSISTANT

## 2019-10-22 PROCEDURE — 81001 URINALYSIS AUTO W/SCOPE: CPT | Performed by: EMERGENCY MEDICINE

## 2019-10-22 PROCEDURE — 99285 EMERGENCY DEPT VISIT HI MDM: CPT

## 2019-10-22 PROCEDURE — 93010 ELECTROCARDIOGRAM REPORT: CPT | Performed by: INTERNAL MEDICINE

## 2019-10-22 RX ORDER — ONDANSETRON 2 MG/ML
4 INJECTION INTRAMUSCULAR; INTRAVENOUS EVERY 6 HOURS PRN
Status: DISCONTINUED | OUTPATIENT
Start: 2019-10-22 | End: 2019-10-22 | Stop reason: HOSPADM

## 2019-10-22 RX ORDER — POTASSIUM CHLORIDE 20 MEQ/1
40 TABLET, EXTENDED RELEASE ORAL ONCE
Status: COMPLETED | OUTPATIENT
Start: 2019-10-22 | End: 2019-10-22

## 2019-10-22 RX ORDER — MAGNESIUM SULFATE HEPTAHYDRATE 40 MG/ML
4 INJECTION, SOLUTION INTRAVENOUS ONCE
Status: COMPLETED | OUTPATIENT
Start: 2019-10-22 | End: 2019-10-22

## 2019-10-22 RX ORDER — OXYBUTYNIN CHLORIDE 5 MG/1
10 TABLET, EXTENDED RELEASE ORAL DAILY
Status: DISCONTINUED | OUTPATIENT
Start: 2019-10-22 | End: 2019-10-22 | Stop reason: HOSPADM

## 2019-10-22 RX ORDER — ARIPIPRAZOLE 10 MG/1
10 TABLET ORAL DAILY
Status: DISCONTINUED | OUTPATIENT
Start: 2019-10-22 | End: 2019-10-22 | Stop reason: HOSPADM

## 2019-10-22 RX ORDER — SACCHAROMYCES BOULARDII 250 MG
250 CAPSULE ORAL 2 TIMES DAILY
Status: DISCONTINUED | OUTPATIENT
Start: 2019-10-22 | End: 2019-10-22 | Stop reason: HOSPADM

## 2019-10-22 RX ORDER — MELATONIN
1000 DAILY
Status: DISCONTINUED | OUTPATIENT
Start: 2019-10-22 | End: 2019-10-22 | Stop reason: HOSPADM

## 2019-10-22 RX ORDER — POTASSIUM CHLORIDE 14.9 MG/ML
20 INJECTION INTRAVENOUS ONCE
Status: COMPLETED | OUTPATIENT
Start: 2019-10-22 | End: 2019-10-22

## 2019-10-22 RX ORDER — GEMFIBROZIL 600 MG/1
600 TABLET, FILM COATED ORAL DAILY
Status: DISCONTINUED | OUTPATIENT
Start: 2019-10-22 | End: 2019-10-22 | Stop reason: HOSPADM

## 2019-10-22 RX ORDER — BENZONATATE 100 MG/1
200 CAPSULE ORAL 3 TIMES DAILY PRN
Status: DISCONTINUED | OUTPATIENT
Start: 2019-10-22 | End: 2019-10-22 | Stop reason: HOSPADM

## 2019-10-22 RX ORDER — SODIUM CHLORIDE 9 MG/ML
125 INJECTION, SOLUTION INTRAVENOUS CONTINUOUS
Status: DISCONTINUED | OUTPATIENT
Start: 2019-10-22 | End: 2019-10-22

## 2019-10-22 RX ORDER — LISINOPRIL 5 MG/1
5 TABLET ORAL DAILY
Status: DISCONTINUED | OUTPATIENT
Start: 2019-10-22 | End: 2019-10-22 | Stop reason: HOSPADM

## 2019-10-22 RX ORDER — ACETAMINOPHEN 325 MG/1
650 TABLET ORAL EVERY 6 HOURS PRN
Status: DISCONTINUED | OUTPATIENT
Start: 2019-10-22 | End: 2019-10-22 | Stop reason: HOSPADM

## 2019-10-22 RX ORDER — PANTOPRAZOLE SODIUM 40 MG/1
40 TABLET, DELAYED RELEASE ORAL
Status: DISCONTINUED | OUTPATIENT
Start: 2019-10-22 | End: 2019-10-22 | Stop reason: HOSPADM

## 2019-10-22 RX ORDER — MAGNESIUM SULFATE HEPTAHYDRATE 40 MG/ML
2 INJECTION, SOLUTION INTRAVENOUS ONCE
Status: COMPLETED | OUTPATIENT
Start: 2019-10-22 | End: 2019-10-22

## 2019-10-22 RX ORDER — VENLAFAXINE 37.5 MG/1
75 TABLET ORAL 3 TIMES DAILY
Status: DISCONTINUED | OUTPATIENT
Start: 2019-10-22 | End: 2019-10-22 | Stop reason: HOSPADM

## 2019-10-22 RX ORDER — FOLIC ACID 1 MG/1
1 TABLET ORAL DAILY
Status: DISCONTINUED | OUTPATIENT
Start: 2019-10-22 | End: 2019-10-22 | Stop reason: HOSPADM

## 2019-10-22 RX ORDER — ASPIRIN 81 MG/1
81 TABLET ORAL DAILY
Status: DISCONTINUED | OUTPATIENT
Start: 2019-10-22 | End: 2019-10-22 | Stop reason: HOSPADM

## 2019-10-22 RX ORDER — CEFAZOLIN SODIUM 2 G/50ML
2000 SOLUTION INTRAVENOUS EVERY 8 HOURS
Status: DISCONTINUED | OUTPATIENT
Start: 2019-10-22 | End: 2019-10-22 | Stop reason: HOSPADM

## 2019-10-22 RX ADMIN — IOHEXOL 100 ML: 350 INJECTION, SOLUTION INTRAVENOUS at 02:22

## 2019-10-22 RX ADMIN — VENLAFAXINE 75 MG: 37.5 TABLET ORAL at 09:00

## 2019-10-22 RX ADMIN — OXYBUTYNIN 10 MG: 5 TABLET, FILM COATED, EXTENDED RELEASE ORAL at 08:59

## 2019-10-22 RX ADMIN — SODIUM CHLORIDE 125 ML/HR: 0.9 INJECTION, SOLUTION INTRAVENOUS at 04:00

## 2019-10-22 RX ADMIN — ARIPIPRAZOLE 10 MG: 10 TABLET ORAL at 08:59

## 2019-10-22 RX ADMIN — ACETAMINOPHEN 650 MG: 325 TABLET, FILM COATED ORAL at 12:34

## 2019-10-22 RX ADMIN — GEMFIBROZIL 600 MG: 600 TABLET ORAL at 08:59

## 2019-10-22 RX ADMIN — MAGNESIUM SULFATE HEPTAHYDRATE 4 G: 40 INJECTION, SOLUTION INTRAVENOUS at 11:14

## 2019-10-22 RX ADMIN — ASPIRIN 81 MG: 81 TABLET, COATED ORAL at 08:59

## 2019-10-22 RX ADMIN — Medication 250 MG: at 09:00

## 2019-10-22 RX ADMIN — MAGNESIUM SULFATE HEPTAHYDRATE 2 G: 40 INJECTION, SOLUTION INTRAVENOUS at 05:51

## 2019-10-22 RX ADMIN — PANTOPRAZOLE SODIUM 40 MG: 40 TABLET, DELAYED RELEASE ORAL at 05:51

## 2019-10-22 RX ADMIN — ENOXAPARIN SODIUM 90 MG: 100 INJECTION SUBCUTANEOUS at 08:58

## 2019-10-22 RX ADMIN — FOLIC ACID 1 MG: 1 TABLET ORAL at 08:58

## 2019-10-22 RX ADMIN — POTASSIUM CHLORIDE 40 MEQ: 1500 TABLET, EXTENDED RELEASE ORAL at 02:41

## 2019-10-22 RX ADMIN — CEFAZOLIN SODIUM 2000 MG: 2 SOLUTION INTRAVENOUS at 14:44

## 2019-10-22 RX ADMIN — LISINOPRIL 5 MG: 5 TABLET ORAL at 08:58

## 2019-10-22 RX ADMIN — POTASSIUM CHLORIDE 20 MEQ: 14.9 INJECTION, SOLUTION INTRAVENOUS at 02:46

## 2019-10-22 RX ADMIN — SODIUM CHLORIDE 500 ML: 0.9 INJECTION, SOLUTION INTRAVENOUS at 02:01

## 2019-10-22 RX ADMIN — CEFAZOLIN SODIUM 2000 MG: 2 SOLUTION INTRAVENOUS at 05:51

## 2019-10-22 RX ADMIN — POTASSIUM CHLORIDE 40 MEQ: 1500 TABLET, EXTENDED RELEASE ORAL at 11:14

## 2019-10-22 RX ADMIN — MELATONIN 1000 UNITS: at 08:59

## 2019-10-22 NOTE — ASSESSMENT & PLAN NOTE
· Potassium is 2 9 upon admission  · History of hypomagnesemia, check magnesium level  · Giving 2 g IV magnesium now  · Repleted with 40 mEq of potassium, 20 mEq IV potassium in the emergency department  · Recheck potassium level in the morning  · Telemetry monitoring overnight

## 2019-10-22 NOTE — ASSESSMENT & PLAN NOTE
· Potassium is 2 9 upon admission  · Magnesium was also low  Both potassium and magnesium were repleted  Will recheck on Friday

## 2019-10-22 NOTE — ASSESSMENT & PLAN NOTE
· History of MSSA bacteremia secondary to infected PICC line, currently on IV Ancef Q 8 hours via new PICC line  · Continue with tentative stop date at 11/04/2019

## 2019-10-22 NOTE — SOCIAL WORK
CM received call from Radha Forte at [place] who stated that patient will be picked up at 4:30pm by [facility] and transported to [address] [place] [address]

## 2019-10-22 NOTE — ASSESSMENT & PLAN NOTE
· Hemoglobin today above baseline, 9 7  · No more active bleeding from nephrostomy tube at this time  Monitor for recurrence of bleeding  · During last hospitalization received transfusion, seen in consultation by Hematology which deemed this to be multifactorial   · Continue Lovenox for treatment and prophylaxis of DVT

## 2019-10-22 NOTE — TELEPHONE ENCOUNTER
Sent this Message to Dr Justine Alvarez via tiger Connect @ [id number]     I also directly connect Dr Justine Alvarez with Quincy Lynch From [address] from [place]/ Pt    [address]  [id number]/ pt would like to be sent to the Ed

## 2019-10-22 NOTE — PLAN OF CARE
Problem: Prexisting or High Potential for Compromised Skin Integrity  Goal: Skin integrity is maintained or improved  Description  INTERVENTIONS:  - Identify patients at risk for skin breakdown  - Assess and monitor skin integrity  - Assess and monitor nutrition and hydration status  - Monitor labs   - Assess for incontinence   - Turn and reposition patient  - Assist with mobility/ambulation  - Relieve pressure over bony prominences  - Avoid friction and shearing  - Provide appropriate hygiene as needed including keeping skin clean and dry  - Evaluate need for skin moisturizer/barrier cream  - Collaborate with interdisciplinary team   - Patient/family teaching  - Consider wound care consult   Outcome: Progressing     Problem: GENITOURINARY - ADULT  Goal: Maintains or returns to baseline urinary function  Description  INTERVENTIONS:  - Assess urinary function  - Encourage oral fluids to ensure adequate hydration if ordered  - Administer IV fluids as ordered to ensure adequate hydration  - Administer ordered medications as needed  - Offer frequent toileting  - Follow urinary retention protocol if ordered  Outcome: Progressing     Problem: METABOLIC, FLUID AND ELECTROLYTES - ADULT  Goal: Electrolytes maintained within normal limits  Description  INTERVENTIONS:  - Monitor labs and assess patient for signs and symptoms of electrolyte imbalances  - Administer electrolyte replacement as ordered  - Monitor response to electrolyte replacements, including repeat lab results as appropriate  - Instruct patient on fluid and nutrition as appropriate  Outcome: Progressing  Goal: Fluid balance maintained  Description  INTERVENTIONS:  - Monitor labs   - Monitor I/O and WT  - Instruct patient on fluid and nutrition as appropriate  - Assess for signs & symptoms of volume excess or deficit  Outcome: Progressing     Problem: SKIN/TISSUE INTEGRITY - ADULT  Goal: Skin integrity remains intact  Description  INTERVENTIONS  - Identify patients at risk for skin breakdown  - Assess and monitor skin integrity  - Assess and monitor nutrition and hydration status  - Monitor labs (i e  albumin)  - Assess for incontinence   - Turn and reposition patient  - Assist with mobility/ambulation  - Relieve pressure over bony prominences  - Avoid friction and shearing  - Provide appropriate hygiene as needed including keeping skin clean and dry  - Evaluate need for skin moisturizer/barrier cream  - Collaborate with interdisciplinary team (i e  Nutrition, Rehabilitation, etc )   - Patient/family teaching  Outcome: Progressing     Problem: HEMATOLOGIC - ADULT  Goal: Maintains hematologic stability  Description  INTERVENTIONS  - Assess for signs and symptoms of bleeding or hemorrhage  - Monitor labs  - Administer supportive blood products/factors as ordered and appropriate  Outcome: Progressing     Problem: MUSCULOSKELETAL - ADULT  Goal: Maintain or return mobility to safest level of function  Description  INTERVENTIONS:  - Assess patient's ability to carry out ADLs; assess patient's baseline for ADL function and identify physical deficits which impact ability to perform ADLs (bathing, care of mouth/teeth, toileting, grooming, dressing, etc )  - Assess/evaluate cause of self-care deficits   - Assess range of motion  - Assess patient's mobility  - Assess patient's need for assistive devices and provide as appropriate  - Encourage maximum independence but intervene and supervise when necessary  - Involve family in performance of ADLs  - Assess for home care needs following discharge   - Consider OT consult to assist with ADL evaluation and planning for discharge  - Provide patient education as appropriate  Outcome: Progressing

## 2019-10-22 NOTE — ASSESSMENT & PLAN NOTE
· Sent in by nursing home after they noticed blood in her nephrostomy bag  · The blood in the nephrostomy bag has cleared up and patient's urine is now clear and yellow  · Patient was evaluated by Urology  She is likely going to have occasional hematuria given that she is on Lovenox for DVT  She should be okay as long as oncology continues to monitor her CBC closely  Her chemotherapy is likely going to have more of an impact on her hemoglobin then hematuria  · Repeat CBC ordered for this Friday

## 2019-10-22 NOTE — ASSESSMENT & PLAN NOTE
· Patient has right hydronephrosis from right sided endometrial cancer  · With hematuria tonight, nephrology consultation

## 2019-10-22 NOTE — UTILIZATION REVIEW
Initial Clinical Review    10/22/2019  0348 INPATIENT and CHANGED 10/22/2019  1510 to OBSERVATION RE:  Patient needs less than one midnight stay for evaluation of hematuria       Orders Placed This Encounter   Procedures    Place in Observation     Standing Status:   Standing     Number of Occurrences:   1     Order Specific Question:   Admitting Physician     Answer:   Agustin De La Vega [[id number]]     Order Specific Question:   Level of Care     Answer:   Med Surg [[id number]]     ED Arrival Information     Expected Arrival Acuity Means of Arrival Escorted By Service Admission Type    - 10/22/2019 00:38 Emergent Ambulance [facility] General Medicine Emergency    Arrival Complaint    Urinary bleeding        Chief Complaint   Patient presents with    Medical Problem     Patient coming from [facility] for blood in right nephrostomy tube  Denies NVD, Patient given ativan and tylenol at facility  Patient reports she had blood transfusion monday  Assessment/Plan: this is a 60year old female from rehab facility to ED admitted to observation due to hematuria   Presented due to blood in nephrostomy tube on right side with right flank pain  Has history of endometrial cancer with right sided nephrostomy tube due to hydronephrosis secondary to cancer  On Lovenox for DVT treatment  Has history of MSSA bacteremia secondary to PICC and on ancef until 11/4/2019  On exam has R CVAT  Right nephrostomy site  Mild bilateral edema  UA large blood    K 2 9   H&H 9 7 & 30 4   Magnesium 1 5  CT did not show reason for hematuria  Electrolyte replacement in progress  Urology consulted  Monitor  H&H and electrolytes  On 10/22 post ED treatment urine from nephrostomy tube is clear  H&H stable at 9 2 & 29 0   K repletion to 3 4       ED Triage Vitals [10/22/19 0041]   Temperature Pulse Respirations Blood Pressure SpO2   97 7 °F (36 5 °C) 77 16 (!) 99/44 95 %      Temp Source Heart Rate Source Patient Position - Orthostatic VS BP Location FiO2 (%)   Oral Monitor Lying Left arm --      Pain Score       4        Wt Readings from Last 1 Encounters:   10/22/19 92 1 kg (203 lb 0 7 oz)     Additional Vital Signs:   10/22/19 1316    84  18  177/94Abnormal   96 %  None (Room air)  Sitting   10/22/19 0704    88  18  174/92Abnormal   94 %  None (Room air)         Pertinent Labs/Diagnostic Test Results:   10/22/2019 CT renal protocol - Etiology for patient's hematuria is not identified on this examination  Stable position of right percutaneous nephroureteral catheter without evidence of hydronephrosis  Stable right external iliac chain adenopathy and stable size of right pelvic mass abutting the right iliac vessels   Small bilateral pleural effusions    10/22/2019  CxR - Small left costophrenic angle effusion with overlying left basilar atelectasis   Marked cardiomegaly    Results from last 7 days   Lab Units 10/22/19  0545 10/22/19  0123 10/18/19  1138   WBC Thousand/uL 5 11 6 85 5 62   HEMOGLOBIN g/dL 9 2* 9 7* 7 5*   HEMATOCRIT % 29 0* 30 4* 24 4*   PLATELETS Thousands/uL 211 212 151   NEUTROS ABS Thousands/µL 2 95 4 13 3 06         Results from last 7 days   Lab Units 10/22/19  0545 10/22/19  0123 10/18/19  1138   SODIUM mmol/L 144 143 141   POTASSIUM mmol/L 3 4* 2 9* 3 8   CHLORIDE mmol/L 110* 107 106   CO2 mmol/L 26 27 25   ANION GAP mmol/L 8 9 10   BUN mg/dL 5 7 7   CREATININE mg/dL 0 53* 0 58* 0 56*   EGFR ml/min/1 73sq m 102 99 100   CALCIUM mg/dL 8 4 8 5 7 4*   MAGNESIUM mg/dL 1 5*  --  1 2*     Results from last 7 days   Lab Units 10/22/19  0123 10/18/19  1138   AST U/L 16 27   ALT U/L 6* 8*   ALK PHOS U/L 92 94   TOTAL PROTEIN g/dL 7 1 6 9   ALBUMIN g/dL 2 4* 2 3*   TOTAL BILIRUBIN mg/dL 0 20 0 40         Results from last 7 days   Lab Units 10/22/19  0545 10/22/19  0123 10/18/19  1138   GLUCOSE RANDOM mg/dL 87 83 80     Results from last 7 days   Lab Units 10/22/19  0123   CK TOTAL U/L 41     Results from last 7 days Lab Units 10/22/19  0123   PROTIME seconds 13 5   INR  1 09   PTT seconds 41*     Results from last 7 days   Lab Units 10/22/19  0123   LACTIC ACID mmol/L 0 8       Results from last 7 days   Lab Units 10/22/19  0123   LIPASE u/L 206     Results from last 7 days   Lab Units 10/22/19  0200   CLARITY UA  Slightly Cloudy   COLOR UA  Pink   SPEC GRAV UA  1 010   PH UA  7 0   GLUCOSE UA mg/dl Negative   KETONES UA mg/dl Negative   BLOOD UA  Large*   PROTEIN UA mg/dl 100 (2+)*   NITRITE UA  Negative   BILIRUBIN UA  Negative   UROBILINOGEN UA E U /dl 0 2   LEUKOCYTES UA  Moderate*   WBC UA /hpf 10-20*   RBC UA /hpf Innumerable*   BACTERIA UA /hpf None Seen   EPITHELIAL CELLS WET PREP /hpf Occasional       ED Treatment:   Medication Administration from 10/22/2019 0037 to 10/22/2019 1523       Date/Time Order Dose Route Action Comments     10/22/2019 0201 sodium chloride 0 9 % bolus 500 mL 500 mL Intravenous New Bag      10/22/2019 0400 sodium chloride 0 9 % infusion 125 mL/hr Intravenous New Bag      10/22/2019 0241 potassium chloride (K-DUR,KLOR-CON) CR tablet 40 mEq 40 mEq Oral Given      10/22/2019 0246 potassium chloride 20 mEq IVPB (premix) 20 mEq Intravenous New Bag      10/22/2019 0222 iohexol (OMNIPAQUE) 350 MG/ML injection (SINGLE-DOSE) 100 mL 100 mL Intravenous Given      10/22/2019 1444 ceFAZolin (ANCEF) IVPB (premix) 2,000 mg 2,000 mg Intravenous New Bag      10/22/2019 0551 ceFAZolin (ANCEF) IVPB (premix) 2,000 mg 2,000 mg Intravenous New Bag      10/22/2019 0859 ARIPiprazole (ABILIFY) tablet 10 mg 10 mg Oral Given      10/22/2019 0859 aspirin (ECOTRIN LOW STRENGTH) EC tablet 81 mg 81 mg Oral Given      10/22/2019 0859 cholecalciferol (VITAMIN D3) tablet 1,000 Units 1,000 Units Oral Given      10/22/2019 0858 enoxaparin (LOVENOX) subcutaneous injection 90 mg 90 mg Subcutaneous Given      10/22/2019 0859 gemfibrozil (LOPID) tablet 600 mg 600 mg Oral Given      90/07/3028 9027 folic acid (FOLVITE) tablet 1 mg 1 mg Oral Given      10/22/2019 0858 lisinopril (ZESTRIL) tablet 5 mg 5 mg Oral Given      10/22/2019 0859 oxybutynin (DITROPAN-XL) 24 hr tablet 10 mg 10 mg Oral Given      10/22/2019 0551 pantoprazole (PROTONIX) EC tablet 40 mg 40 mg Oral Given      10/22/2019 0900 saccharomyces boulardii (FLORASTOR) capsule 250 mg 250 mg Oral Given      10/22/2019 0900 venlafaxine (EFFEXOR) tablet 75 mg 75 mg Oral Given      10/22/2019 1234 acetaminophen (TYLENOL) tablet 650 mg 650 mg Oral Given      10/22/2019 0551 magnesium sulfate 2 g/50 mL IVPB (premix) 2 g 2 g Intravenous New Bag      10/22/2019 1114 magnesium sulfate 4 g/100 mL IVPB (premix) 4 g 4 g Intravenous New Bag      10/22/2019 1114 potassium chloride (K-DUR,KLOR-CON) CR tablet 40 mEq 40 mEq Oral Given         Past Medical History:   Diagnosis Date    Anemia     Chemotherapy follow-up examination     Depression     Diabetes mellitus ([facility] )     Endometrial cancer ([facility] ) 12/2017    Hyperglycemia     vx type 2 dm -- last assessed 4/1/14; resolved 11/7/17    Hyperlipidemia     Hypertension     Obesity     last assessed 10/14/17; resolved 11/7/17     Present on Admission:   Endometrial cancer ([facility] )   Benign essential hypertension   Acute deep vein thrombosis (DVT) of proximal vein of lower extremity (HCC)   MSSA bacteremia   Hypokalemia   Other hydronephrosis   Major depression, chronic   Anemia   Gross hematuria      Admitting Diagnosis: Blood in urine [R31 9]  Age/Sex: 60 y o  female  Admission Orders: 10/22/2019 0348 INPATIENT and CHANGED 10/22/2019 1510 OBSERVATION     Medications:  ARIPiprazole 10 mg Oral Daily   aspirin 81 mg Oral Daily   ceFAZolin 2,000 mg Intravenous Q8H   cholecalciferol 1,000 Units Oral Daily   enoxaparin 90 mg Subcutaneous R02L CHRISTIANO   folic acid 1 mg Oral Daily   gemfibrozil 600 mg Oral Daily   lisinopril 5 mg Oral Daily   oxybutynin 10 mg Oral Daily   pantoprazole 40 mg Oral Early Morning   saccharomyces boulardii 250 mg Oral BID venlafaxine 75 mg Oral TID          Acetaminophen - used x 1  650 mg Oral Q6H PRN   benzonatate 200 mg Oral TID PRN   ondansetron 4 mg Intravenous Q6H PRN       IP CONSULT TO UROLOGY    Network Utilization Review Department  [email or web address]  ATTENTION: Please call with any questions or concerns to [phone] and carefully listen to the prompts so that you are directed to the right person  All voicemails are confidential   Suzanne Dann all requests for admission clinical reviews, approved or denied determinations and any other requests to dedicated fax number below belonging to the campus where the patient is receiving treatment    FACILITY NAME UR FAX NUMBER   ADMISSION DENIALS (Administrative/Medical Necessity) [address] [facility] (Maternity/NICU/Pediatrics) [phone]   [facility] [address] [facility] [phone]   [address] [place] [address] 1525 [facility] [phone]   Mylinda Reveal [address] [place] [address] [phone]

## 2019-10-22 NOTE — DISCHARGE SUMMARY
Discharge- Oc Prior 1955, 60 y o  female MRN: [id number]    Unit/Bed#: ED 18 Encounter: [id number]    Primary Care Provider: Anna Reddy DO   Date and time admitted to hospital: 10/22/2019 12:38 AM      Gross hematuria  Assessment & Plan  · Sent in by nursing home after they noticed blood in her nephrostomy bag  · The blood in the nephrostomy bag has cleared up and patient's urine is now clear and yellow  · Patient was evaluated by Urology  She is likely going to have occasional hematuria given that she is on Lovenox for DVT  She should be okay as long as oncology continues to monitor her CBC closely  Her chemotherapy is likely going to have more of an impact on her hemoglobin then hematuria  · Repeat CBC ordered for this Friday  MSSA bacteremia  Assessment & Plan  · History of MSSA bacteremia secondary to infected PICC line, currently on IV Ancef Q 8 hours via new PICC line  · Continue with tentative stop date at 11/04/2019  Anemia  Assessment & Plan  · Hemoglobin today above baseline, 9 2  · No more active bleeding from nephrostomy tube at this time  · Patient seen by Urology  Okay for discharge  Will recheck CBC on Friday  As it is, patient's hemoglobin will no doubt trend down as she continues to receive chemotherapy for her endometrial cancer  Her oncologist is following this and she does get periodic outpatient blood transfusions  Other hydronephrosis  Assessment & Plan  · Patient has right hydronephrosis from right sided endometrial cancer  Acute deep vein thrombosis (DVT) of proximal vein of lower extremity (HCC)  Assessment & Plan  · Continue subcutaneous Lovenox q 12 hours  · Secondary to history of malignancy  Endometrial cancer [facility])  Assessment & Plan  · Gynecology Oncology follow-up as an outpatient  Major depression, chronic  Assessment & Plan  · Continue Abilify, Effexor  · Outpatient psychiatrist follow-up      Benign essential hypertension  Assessment & Plan  · Continue lisinopril  * Hypokalemia  Assessment & Plan  · Potassium is 2 9 upon admission  · Magnesium was also low  Both potassium and magnesium were repleted  Will recheck on Friday  Disposition:     Other: SNF     Reason for Admission:  Hematuria, hypokalemia    Discharge Diagnoses:     Principal Problem:    Hypokalemia  Active Problems:    Benign essential hypertension    Major depression, chronic    Endometrial cancer (HCC)    Acute deep vein thrombosis (DVT) of proximal vein of lower extremity (HCC)    Other hydronephrosis    Anemia    MSSA bacteremia    Gross hematuria  Resolved Problems:    * No resolved hospital problems  *      Consultations During Hospital Stay:  · Urology    Procedures Performed:     · None    Significant Findings / Test Results:     CT renal protocol [[id number]] Collected: 10/22/19 0250   Order Status: Completed Updated: 10/22/19 0331   Narrative:     CT ABDOMEN AND PELVIS WITH AND WITHOUT IV CONTRAST    INDICATION:   hematuria, R flank pain, nephrostomy tube  COMPARISON:  CT abdomen/pelvis 10/6/2019  TECHNIQUE: Initial CT of the abdomen and pelvis was performed without intravenous contrast   Subsequent dynamic CT evaluation of the abdomen and pelvis was performed after the administration of intravenous contrast in both nephrographic and delayed   phases after the administration of intravenous contrast   Axial, sagittal, and coronal 2D reformatted images were created from the source data and submitted for interpretation  Radiation dose length product (DLP) for this visit:  2374 mGy-cm    This examination, like all CT scans performed in the [facility], was performed utilizing techniques to minimize radiation dose exposure, including the use of iterative   reconstruction and automated exposure control      IV Contrast:  100 mL of iohexol (OMNIPAQUE)  Enteric Contrast:  Enteric contrast was not administered  FINDINGS:    ABDOMEN    RIGHT KIDNEY AND URETER:  No solid renal mass   No detectable urothelial mass   Exophytic simple renal cyst superior pole of the right kidney  No hydronephrosis or hydroureter  No urinary tract calculi  No perinephric collection  LEFT KIDNEY AND URETER:  No solid renal mass   No detectable urothelial mass  No hydronephrosis or hydroureter  No urinary tract calculi  No perinephric collection  URINARY BLADDER:  No bladder wall mass  No calculi  LOWER CHEST:  Small bilateral pleural effusions  LIVER/BILIARY TREE:  Unremarkable  GALLBLADDER:  No calcified gallstones  No pericholecystic inflammatory change  SPLEEN:  Unremarkable  PANCREAS:  Unremarkable  ADRENAL GLANDS:  Unremarkable  STOMACH AND BOWEL:  Status post gastric bypass    ABDOMINOPELVIC CAVITY:  No ascites   No free intraperitoneal air   Stable right external iliac chain adenopathy measuring up to 1 3 cm   Stable sized right pelvic mass with persistent encasement/abutment of the right iliac vessels and probable occlusion   of the right external iliac vein       VESSELS:  Unremarkable for patient's age  PELVIS    REPRODUCTIVE ORGANS:  Patient is status post hysterectomy  APPENDIX: No findings to suggest appendicitis  ABDOMINAL WALL/INGUINAL REGIONS:  Unremarkable  OSSEOUS STRUCTURES:  No acute fracture or destructive osseous lesion  Impression:       Etiology for patient's hematuria is not identified on this examination    Stable position of right percutaneous nephroureteral catheter without evidence of hydronephrosis      Stable right external iliac chain adenopathy and stable size of right pelvic mass abutting the right iliac vessels   Small bilateral pleural effusions      Workstation performed: [id number]   XR chest 1 view portable [[id number]] Collected: 10/22/19 0951   Order Status: Completed Updated: 10/22/19 0953   Narrative:     CHEST     INDICATION:   flank pain, hematuria  COMPARISON:  September 30, 2019    EXAM PERFORMED/VIEWS:  XR CHEST PORTABLE      FINDINGS:  Right-sided PICC line is present, tip overlying superior vena cava  Heart shadow is enlarged but unchanged from prior exam     There is a small left costophrenic angle effusion with overlying airspace opacity   Otherwise normal aeration of visualized lung fields  Osseous structures appear within normal limits for patient age  Impression:       Small left costophrenic angle effusion with overlying left basilar atelectasis   Marked cardiomegaly  ·     Incidental Findings:   · None     Test Results Pending at Discharge (will require follow up): · Blood in urine cultures     Outpatient Tests Requested:  · CBC, BMP and magnesium    Complications:  None    Hospital Course:     Patient was admitted for transient hematuria in her right-sided nephrostomy bag  She was also noted to have a potassium of 2 9  Her hemoglobin was above 9 0 which is actually above her baseline  She is getting chemotherapy for endometrial cancer and does have to get periodic transfusions  The hematuria in her nephrostomy bag did clear up  In addition to the hyperkalemia her magnesium was also low at 1 2  Replacement was given with magnesium coming up to 1 5 and potassium coming up to 3 4  For replacement was given  Patient was otherwise stable for discharge  She was then discharged  We will recheck lab work this Friday  Condition at Discharge: stable     Discharge Day Visit / Exam:     * Please refer to separate progress note for these details *      Discharge instructions/Information to patient and family:   See after visit summary for information provided to patient and family  Provisions for Follow-Up Care:  See after visit summary for information related to follow-up care and any pertinent home health orders  Planned Readmission:  None    Discharge Statement:  I spent 30 minutes discharging the patient  This time was spent on the day of discharge  I had direct contact with the patient on the day of discharge  Greater than 50% of the total time was spent examining patient, answering all patient questions, arranging and discussing plan of care with patient as well as directly providing post-discharge instructions  Additional time then spent on discharge activities  Discharge Medications:  See after visit summary for reconciled discharge medications provided to patient and family  ** Please Note: This note has been constructed using a voice recognition system   **

## 2019-10-22 NOTE — ED NOTES
Patient transported to [facility] [place] Rene Streeter RN  10/22/19 5437

## 2019-10-22 NOTE — ED PROVIDER NOTES
History  Chief Complaint   Patient presents with    Medical Problem     Patient coming from [facility] for blood in right nephrostomy tube  Denies NVD, Patient given ativan and tylenol at facility  Patient reports she had blood transfusion monday  Patient is a 60year old female with blood noted from nephrostomy tube on right side tonight with right flank pain  No N/V  No fever  Has a h/o metastatic endometrial cancer  Got blood transfusion yesterday  Declines pain medication  Was last seen in this ED on 9/29/19 for weakness  [facility] website checked on this patient and last Rx filled was on 10/8/19 for ativan for 1 day supply  Patient needed my urgent attention  Patient is on lovenox  History provided by:  Patient, EMS personnel and nursing home   used: No    Medical Problem   Associated symptoms: no diarrhea, no fever, no nausea and no vomiting        Prior to Admission Medications   Prescriptions Last Dose Informant Patient Reported? Taking? ARIPiprazole (ABILIFY) 10 mg tablet  Self Yes No   Sig: Take 10 mg by mouth daily   CRANIAL PROSTHESIS, RX,   No No   Sig: Please provide patient with extracranial prosthesis while undergoing active chemotherapy treatment     Patient not taking: Reported on 10/15/2019   LORazepam (ATIVAN) 1 mg tablet   No No   Sig: Take 1 tablet (1 mg total) by mouth every 6 (six) hours as needed for anxiety (or nausea) for up to 10 days   MYRBETRIQ 50 MG TB24  Self No No   Sig: TAKE 1 TABLET DAILY   acetaminophen (TYLENOL) 325 mg tablet   No No   Sig: Take 2 tablets (650 mg total) by mouth every 6 (six) hours as needed for mild pain, headaches or fever   aspirin (ECOTRIN LOW STRENGTH) 81 mg EC tablet  Self Yes No   Sig: Take 81 mg by mouth daily   benzonatate (TESSALON) 200 MG capsule   No No   Sig: Take 1 capsule (200 mg total) by mouth 3 (three) times a day as needed for cough   ceFAZolin (ANCEF) 2000 mg IVPB   No No   Sig: Infuse 2,000 mg into a venous catheter every 8 (eight) hours   cholecalciferol (VITAMIN D3) 1,000 units tablet  Self Yes No   Sig: Take 1,000 Units by mouth daily   enoxaparin (LOVENOX) 100 mg/mL   No No   Sig: Inject 0 9 mL (90 mg total) under the skin every 12 (twelve) hours   folic acid (FOLVITE) 1 mg tablet   No No   Sig: Take 1 tablet (1 mg total) by mouth daily   gemfibrozil (LOPID) 600 mg tablet  Self No No   Sig: TAKE 1 TABLET BY MOUTH DAILY   lisinopril (ZESTRIL) 5 mg tablet   No No   Sig: Take 1 tablet (5 mg total) by mouth daily   omeprazole (PriLOSEC) 20 mg delayed release capsule  Self Yes No   Sig: Take 20 mg by mouth daily   ondansetron (ZOFRAN) 8 mg tablet  Self No No   Sig: Take 1 tablet (8 mg total) by mouth every 8 (eight) hours as needed for nausea or vomiting   saccharomyces boulardii (FLORASTOR) 250 mg capsule   No No   Sig: Take 1 capsule (250 mg total) by mouth 2 (two) times a day   venlafaxine (EFFEXOR) 75 mg tablet  Self Yes No   Sig: Take 75 mg by mouth 3 (three) times a day        Facility-Administered Medications: None       Past Medical History:   Diagnosis Date    Anemia     Chemotherapy follow-up examination     Depression     Diabetes mellitus ([facility] [address] )     Endometrial cancer ([facility] [address] ) 12/2017    Hyperglycemia     vx type 2 dm -- last assessed 4/1/14; resolved 11/7/17    Hyperlipidemia     Hypertension     Obesity     last assessed 10/14/17; resolved 11/7/17       Past Surgical History:   Procedure Laterality Date    ABDOMINAL SURGERY      GASTRIC BYPASS    CHOLECYSTECTOMY      at the time of gastric bypass    COLONOSCOPY      CT NEEDLE BIOPSY LYMPH NODE  7/8/2019    GASTRIC BYPASS      HYSTERECTOMY Bilateral     total abdominal with salpingo-oophorectomy    IR PICC LINE  9/27/2019    IR PORT PLACEMENT  7/26/2019    IR PORT REMOVAL  9/20/2019    IR TUBE PLACEMENT NEPHROSTOMY  7/26/2019    OOPHORECTOMY Bilateral     CA LAP, RADICAL HYST W/ TUBE&OV, NODE BX N/A 12/19/2017    Procedure: HYSTERECTOMY LAPAROSCOPIC TOTAL ([address]) W/ ROBOTICS; BILATERAL SALPINGOOPHERECTOMY; LYMPH NODE DISSECTION; lysis of adhesions;  Surgeon: Davie Wright MD;  [facility]: [facility];  Service: Gynecology Oncology    TX LAP,DIAGNOSTIC ABDOMEN N/A 12/19/2017    Procedure: LAPAROSCOPY DIAGNOSTIC;  Surgeon: Davie Wright MD;  [facility];  Service: Gynecology Oncology    TONSILLECTOMY      US GUIDED BREAST BIOPSY RIGHT COMPLETE Right 6/28/2019       Family History   Problem Relation Age of Onset    Other Mother         dyslipidemia    Ovarian cancer Mother 48    Lymphoma Father     Bone cancer Maternal Grandfather     No Known Problems Sister     No Known Problems Maternal Grandmother     No Known Problems Paternal Grandmother     No Known Problems Paternal Grandfather     No Known Problems Maternal Aunt     No Known Problems Maternal Aunt     No Known Problems Maternal Aunt     No Known Problems Maternal Aunt     No Known Problems Paternal Aunt     No Known Problems Paternal Aunt     No Known Problems Paternal Aunt     No Known Problems Paternal Aunt     No Known Problems Brother     No Known Problems Brother      I have reviewed and agree with the history as documented  Social History     Tobacco Use    Smoking status: Never Smoker    Smokeless tobacco: Never Used   Substance Use Topics    Alcohol use: Not Currently    Drug use: No        Review of Systems   Constitutional: Negative for fever  Gastrointestinal: Negative for diarrhea, nausea and vomiting  Genitourinary: Positive for flank pain and hematuria  All other systems reviewed and are negative  Physical Exam  Physical Exam   Constitutional: She is oriented to person, place, and time  She appears well-developed and well-nourished  She appears distressed (moderate)  HENT:   Head: Normocephalic and atraumatic  Mucous membranes somewhat moist     Eyes: Conjunctivae are normal  No scleral icterus     Neck: Normal range of motion  Neck supple  No JVD present  No tracheal deviation present  Cardiovascular: Normal rate, regular rhythm and normal heart sounds  No murmur heard  Pulmonary/Chest: Effort normal and breath sounds normal  No stridor  No respiratory distress  She has no wheezes  She has no rales  Abdominal: Soft  Bowel sounds are normal  She exhibits no distension  There is no tenderness  There is no rebound and no guarding  R CVAT  R nephrostomy site C/D/I  Musculoskeletal: She exhibits edema (mild bilateral LE edema  )  She exhibits no deformity  Neurological: She is alert and oriented to person, place, and time  Skin: Skin is warm and dry  No rash noted  There is pallor (somewhat)  Psychiatric: She has a normal mood and affect  Nursing note and vitals reviewed        Vital Signs  ED Triage Vitals [10/22/19 0041]   Temperature Pulse Respirations Blood Pressure SpO2   97 7 °F (36 5 °C) 77 16 (!) 99/44 95 %      Temp Source Heart Rate Source Patient Position - Orthostatic VS BP Location FiO2 (%)   Oral Monitor Lying Left arm --      Pain Score       4           Vitals:    10/22/19 0041 10/22/19 0245   BP: (!) 99/44 158/66   Pulse: 77 80   Patient Position - Orthostatic VS: Lying          Visual Acuity      ED Medications  Medications   sodium chloride 0 9 % infusion (has no administration in time range)   potassium chloride 20 mEq IVPB (premix) (20 mEq Intravenous New Bag 10/22/19 0246)   sodium chloride 0 9 % bolus 500 mL (500 mL Intravenous New Bag 10/22/19 0201)   potassium chloride (K-DUR,KLOR-CON) CR tablet 40 mEq (40 mEq Oral Given 10/22/19 0241)   iohexol (OMNIPAQUE) 350 MG/ML injection (SINGLE-DOSE) 100 mL (100 mL Intravenous Given 10/22/19 0222)       Diagnostic Studies  Results Reviewed     Procedure Component Value Units Date/Time    Urine Microscopic [[id number]]  (Abnormal) Collected:  10/22/19 0200    Lab Status:  Final result Specimen:  Urine, Other Updated:  10/22/19 0229     RBC, UA Innumerable /hpf      WBC, UA 10-20 /hpf      Epithelial Cells Occasional /hpf      Bacteria, UA None Seen /hpf     Urine culture [[id number]] Collected:  10/22/19 0200    Lab Status: In process Specimen:  Urine, Other Updated:  10/22/19 0229    UA w Reflex to Microscopic w Reflex to Culture [[id number]]  (Abnormal) Collected:  10/22/19 0200    Lab Status:  Final result Specimen:  Urine, Other Updated:  10/22/19 0212     Color, UA Pink     Clarity, UA Slightly Cloudy     Specific [place], UA 1 010     pH, UA 7 0     Leukocytes, UA Moderate     Nitrite, UA Negative     Protein,  (2+) mg/dl      Glucose, UA Negative mg/dl      Ketones, UA Negative mg/dl      Urobilinogen, UA 0 2 E U /dl      Bilirubin, UA Negative     Blood, UA Large    Blood culture #1 [[id number]] Collected:  10/22/19 0200    Lab Status: In process Specimen:  Blood from Hand, Right Updated:  10/22/19 0207    Lactic acid, plasma [[id number]]  (Normal) Collected:  10/22/19 0123    Lab Status:  Final result Specimen:  Blood from Arm, Left Updated:  10/22/19 0153     LACTIC ACID 0 8 mmol/L     Narrative:       Result may be elevated if tourniquet was used during collection      Comprehensive metabolic panel [[id number]]  (Abnormal) Collected:  10/22/19 0123    Lab Status:  Final result Specimen:  Blood from Arm, Left Updated:  10/22/19 0150     Sodium 143 mmol/L      Potassium 2 9 mmol/L      Chloride 107 mmol/L      CO2 27 mmol/L      ANION GAP 9 mmol/L      BUN 7 mg/dL      Creatinine 0 58 mg/dL      Glucose 83 mg/dL      Calcium 8 5 mg/dL      AST 16 U/L      ALT 6 U/L      Alkaline Phosphatase 92 U/L      Total Protein 7 1 g/dL      Albumin 2 4 g/dL      Total Bilirubin 0 20 mg/dL      eGFR 99 ml/min/1 73sq m     Narrative:       [place] guidelines for Chronic Kidney Disease (CKD):     Stage 1 with normal or high GFR (GFR > 90 mL/min/1 73 square meters)    Stage 2 Mild CKD (GFR = 60-89 mL/min/1 73 square meters)   Stage 3A Moderate CKD (GFR = 45-59 mL/min/1 73 square meters)    Stage 3B Moderate CKD (GFR = 30-44 mL/min/1 73 square meters)    Stage 4 Severe CKD (GFR = 15-29 mL/min/1 73 square meters)    Stage 5 End Stage CKD (GFR <15 mL/min/1 73 square meters)  Note: GFR calculation is accurate only with a steady state creatinine    Lipase [[id number]]  (Normal) Collected:  10/22/19 0123    Lab Status:  Final result Specimen:  Blood from Arm, Left Updated:  10/22/19 0150     Lipase 206 u/L     CK Total with Reflex CKMB [[id number]]  (Normal) Collected:  10/22/19 0123    Lab Status:  Final result Specimen:  Blood from Arm, Left Updated:  10/22/19 0150     Total CK 41 U/L     Protime-INR [[id number]]  (Normal) Collected:  10/22/19 0123    Lab Status:  Final result Specimen:  Blood from Arm, Left Updated:  10/22/19 0144     Protime 13 5 seconds      INR 1 09    APTT [[id number]]  (Abnormal) Collected:  10/22/19 0123    Lab Status:  Final result Specimen:  Blood from Arm, Left Updated:  10/22/19 0144     PTT 41 seconds     CBC and differential [[id number]]  (Abnormal) Collected:  10/22/19 0123    Lab Status:  Final result Specimen:  Blood from Arm, Left Updated:  10/22/19 0133     WBC 6 85 Thousand/uL      RBC 3 31 Million/uL      Hemoglobin 9 7 g/dL      Hematocrit 30 4 %      MCV 92 fL      MCH 29 3 pg      MCHC 31 9 g/dL      RDW 19 7 %      MPV 10 9 fL      Platelets 653 Thousands/uL      nRBC 0 /100 WBCs      Neutrophils Relative 61 %      Immat GRANS % 0 %      Lymphocytes Relative 27 %      Monocytes Relative 7 %      Eosinophils Relative 5 %      Basophils Relative 0 %      Neutrophils Absolute 4 13 Thousands/µL      Immature Grans Absolute 0 02 Thousand/uL      Lymphocytes Absolute 1 86 Thousands/µL      Monocytes Absolute 0 51 Thousand/µL      Eosinophils Absolute 0 31 Thousand/µL      Basophils Absolute 0 02 Thousands/µL     Blood culture #2 [[id number]] Collected:  10/22/19 0123    Lab Status:   In process Specimen:  Blood from Arm, Left Updated:  10/22/19 0128                 CT renal protocol   ED Interpretation by Yee Johnson MD (23/32 [id number])   COMPARISON:  CT abdomen/pelvis 10/6/2019  TECHNIQUE: Initial CT of the abdomen and pelvis was performed without intravenous contrast   Subsequent dynamic CT evaluation of the abdomen and pelvis was performed after the administration of intravenous contrast in both nephrographic and delayed    phases after the administration of intravenous contrast   Axial, sagittal, and coronal 2D reformatted images were created from the source data and submitted for interpretation  Radiation dose length product (DLP) for this visit:  2749 mGy-cm    This examination, like all CT scans performed in the [facility], was performed utilizing techniques to minimize radiation dose exposure, including the use of iterative    reconstruction and automated exposure control  IV Contrast:  100 mL of iohexol (OMNIPAQUE)   Enteric Contrast:  Enteric contrast was not administered  FINDINGS:      ABDOMEN      RIGHT KIDNEY AND URETER:   No solid renal mass   No detectable urothelial mass   Exophytic simple renal cyst superior pole of the right kidney  No hydronephrosis or hydroureter  No urinary tract calculi  No perinephric collection  LEFT KIDNEY AND URETER:   No solid renal mass   No detectable urothelial mass  No hydronephrosis or hydroureter  No urinary tract calculi  No perinephric collection  URINARY BLADDER:   No bladder wall mass  No calculi  LOWER CHEST:  Small bilateral pleural effusions  LIVER/BILIARY TREE:  Unremarkable  GALLBLADDER:  No calcified gallstones  No pericholecystic inflammatory change  SPLEEN:  Unremarkable  PANCREAS:  Unremarkable  ADRENAL GLANDS:  Unremarkable        STOMACH AND BOWEL:  Status post gastric bypass      ABDOMINOPELVIC CAVITY:  No ascites   No free intraperitoneal air   Stable right external iliac chain adenopathy measuring up to 1 3 cm   Stable sized right pelvic mass with persistent encasement/abutment of the right iliac vessels and probable occlusion    of the right external iliac vein         VESSELS:  Unremarkable for patient's age  PELVIS      REPRODUCTIVE ORGANS:  Patient is status post hysterectomy  APPENDIX: No findings to suggest appendicitis  ABDOMINAL WALL/INGUINAL REGIONS:  Unremarkable  OSSEOUS STRUCTURES:  No acute fracture or destructive osseous lesion  Impression:        Etiology for patient's hematuria is not identified on this examination      Stable position of right percutaneous nephroureteral catheter without evidence of hydronephrosis  Stable right external iliac chain adenopathy and stable size of right pelvic mass abutting the right iliac vessels   Small bilateral pleural effusions         Workstation performed: [id number]         Final Result by Tre Lama MD (10/22 4362)      Etiology for patient's hematuria is not identified on this examination      Stable position of right percutaneous nephroureteral catheter without evidence of hydronephrosis  Stable right external iliac chain adenopathy and stable size of right pelvic mass abutting the right iliac vessels  Small bilateral pleural effusions         Workstation performed: [id number]         XR chest 1 view portable   ED Interpretation by Neva Maxwell MD (10/22 4919)   Cardiomegaly read by me                    Procedures  ECG 12 Lead Documentation Only  Date/Time: 10/22/2019 1:50 AM  Performed by: Neva Maxwell MD  Authorized by: Neva Maxwell MD     Indications / Diagnosis:  Flank pain, hematuria  ECG reviewed by me, the ED Provider: yes    Patient location:  ED  Previous ECG:     Previous ECG:  Compared to current    Comparison ECG info:  12/19/17    Similarity:  Changes noted (nonspecific T wave changes and prolonged QT now)  Quality:     Tracing quality: Limited by artifact  Rate:     ECG rate:  76    ECG rate assessment: normal    Rhythm:     Rhythm: sinus rhythm    Ectopy:     Ectopy: none    QRS:     QRS axis:  Normal    QRS intervals:  Normal  Conduction:     Conduction: normal    ST segments:     ST segments:  Normal  T waves:     T waves: non-specific    Other findings:     Other findings: prolonged qTc interval      CriticalCare Time  Performed by: Louis Rodriguez MD  Authorized by: Louis Rodriguez MD     Critical care provider statement:     Critical care time (minutes):  35    Critical care time was exclusive of:  Separately billable procedures and treating other patients    Critical care was necessary to treat or prevent imminent or life-threatening deterioration of the following conditions: low blood pressure  Critical care was time spent personally by me on the following activities:  Obtaining history from patient or surrogate, development of treatment plan with patient or surrogate, evaluation of patient's response to treatment, examination of patient, ordering and performing treatments and interventions, ordering and review of laboratory studies, ordering and review of radiographic studies, re-evaluation of patient's condition and review of old charts    I assumed direction of critical care for this patient from another provider in my specialty: no             ED Course  ED Course as of Oct 22 0349   Tue Oct 22, 2019   0251 BP improved with IVFs  [address] and CT d/w patient  MDM  Number of Diagnoses or Management Options  Diagnosis management comments: DDx including but not limited to: UTI, coagulopathy, anemia, renal colic, pyelonephritis, tumor, urinary retention, nephrostomy tube complication  DDx including but not limited to: GI etiology, appendicitis, diverticulitis, pancreatitis, cholecystitis, biliary colic, AAA, rhabdomyolysis, tumor, zoster          Amount and/or Complexity of Data Reviewed  Clinical lab tests: ordered and reviewed  Tests in the radiology section of CPT®: ordered and reviewed  Decide to obtain previous medical records or to obtain history from someone other than the patient: yes  Obtain history from someone other than the patient: yes  Review and summarize past medical records: yes  Discuss the patient with other providers: yes  Independent visualization of images, tracings, or specimens: yes        Disposition  Final diagnoses:   Hematuria   Right flank pain   Hypotension   Hypokalemia     Time reflects when diagnosis was documented in both MDM as applicable and the Disposition within this note     Time User Action Codes Description Comment    10/22/2019  3:41 AM Yevgeniy Anderson Add [R31 9] Hematuria     10/22/2019  3:41 AM Yevgeniy Anderson Add [R10 9] Right flank pain     10/22/2019  3:41 AM Yevgeniy Anderson Add [I95 9] Hypotension     10/22/2019  3:48 AM Yevgeniy Anderson Add [E87 6] Hypokalemia       ED Disposition     ED Disposition Condition Date/Time Comment    Admit Stable Tue Oct 22, 2019  3:48 AM Case was discussed with PA Royann Boas and the patient's admission status was agreed to be Admission Status: inpatient status to the service of Dr Santiago Power   Follow-up Information    None         Patient's Medications   Discharge Prescriptions    No medications on file     No discharge procedures on file      ED Provider  Electronically Signed by           Zak Crowell MD  10/22/19 6209

## 2019-10-22 NOTE — H&P
H&P- Magi Forbes Al 1955, 60 y o  female MRN: [id number]  Unit/Bed#: ED 18 Encounter: [id number]  Primary Care Provider: Felix Hwang DO   Date and time admitted to hospital: 10/22/2019 12:38 AM    Gross hematuria  Assessment & Plan  · Sent in by nursing home today after they noticed blood in her nephrostomy bag  · There was blood in the nephrostomy bag upon arrival to the emergency department, now upon my examination the blood is clear yellow  No evidence of blood at this time  Occasional back pain, not having any at this time  · Urinalysis showing blood in the urine, 10-20 white blood cells  No bacteria noted  · Will consult Urology, likely will recommend close monitoring for recurrence of hematuria  · Continuing with Lovenox at this time  · Monitor CBC, hemoglobin is 9 7  MSSA bacteremia  Assessment & Plan  · History of MSSA bacteremia secondary to infected PICC line, currently on IV Ancef Q 8 hours via new PICC line  · Continue with tentative stop date at 11/04/2019  Anemia  Assessment & Plan  · Hemoglobin today above baseline, 9 7  · No more active bleeding from nephrostomy tube at this time  Monitor for recurrence of bleeding  · During last hospitalization received transfusion, seen in consultation by Hematology which deemed this to be multifactorial   · Continue Lovenox for treatment and prophylaxis of DVT  Other hydronephrosis  Assessment & Plan  · Patient has right hydronephrosis from right sided endometrial cancer  · With hematuria tonight, nephrology consultation  Acute deep vein thrombosis (DVT) of proximal vein of lower extremity (HCC)  Assessment & Plan  · Continue subcutaneous Lovenox q 12 hours  · Secondary to history of malignancy  Endometrial cancer [facility])  Assessment & Plan  · Gynecology Oncology follow-up as an outpatient  Major depression, chronic  Assessment & Plan  · Continue Abilify, Effexor  · Outpatient psychiatrist follow-up      Benign essential hypertension  Assessment & Plan  · Blood pressure reviewed and acceptable  · Continue lisinopril  · Monitor blood pressure  * Hypokalemia  Assessment & Plan  · Potassium is 2 9 upon admission  · History of hypomagnesemia, check magnesium level  · Giving 2 g IV magnesium now  · Repleted with 40 mEq of potassium, 20 mEq IV potassium in the emergency department  · Recheck potassium level in the morning  · Telemetry monitoring overnight  VTE Prophylaxis: Enoxaparin (Lovenox)  / sequential compression device   Code Status: FULL CODE   POLST: POLST form is not discussed and not completed at this time  Discussion with family: patient at bedside     Anticipated Length of Stay:  Patient will be admitted on an Inpatient basis with an anticipated length of stay of greater than 2 midnights  Justification for Hospital Stay:  Hematuria, hypokalemia    Total Time for Visit, including Counseling / Coordination of Care: 1 hour  Greater than 50% of his total time spent on direct patient counseling and coordination of care  Chief Complaint:   Hematuria     History of Present Illness:    Cherise Dominguez is a 60 y o  female who presents from nursing home with one episode of hematuria in nephrostomy bag  Patient has right-sided nephrostomy tube secondary to right-sided endometrial cancer  Noticed today that she had blood in her nephrostomy bag  She still had some blood in her nephrostomy bag upon arrival to emergency department, however this blood in the urine has now resolved  It is a clear yellow urine  Denies any fevers or chills  Has had occasional flank pain  Has a history of anemia, received transfusion on Monday  Denies any lightheadedness, chest pain, shortness of breath, dizziness, syncope  She also has a history of MSSA bacteremia secondary to infected PICC line  She is on IV Ancef to 3 times a day through 11/4/2019      Review of Systems:    Review of Systems   Constitutional: Negative for activity change, appetite change, chills, fatigue and fever  HENT: Negative for congestion, rhinorrhea, sinus pressure and sore throat  Eyes: Negative for photophobia, pain and visual disturbance  Respiratory: Negative for cough, shortness of breath and wheezing  Cardiovascular: Negative for chest pain, palpitations and leg swelling  Gastrointestinal: Negative for abdominal distention, abdominal pain, constipation, diarrhea, nausea and vomiting  Endocrine: Negative for cold intolerance, heat intolerance, polydipsia and polyuria  Genitourinary: Positive for flank pain and hematuria  Negative for difficulty urinating, dysuria and frequency  Musculoskeletal: Negative for arthralgias, back pain and joint swelling  Skin: Negative for color change, pallor and rash  Allergic/Immunologic: Negative  Neurological: Negative for dizziness, syncope, weakness, light-headedness and headaches  Hematological: Negative  Psychiatric/Behavioral: Negative          Past Medical and Surgical History:     Past Medical History:   Diagnosis Date    Anemia     Chemotherapy follow-up examination     Depression     Diabetes mellitus ([facility] [address] )     Endometrial cancer ([facility] ) 12/2017    Hyperglycemia     vx type 2 dm -- last assessed 4/1/14; resolved 11/7/17    Hyperlipidemia     Hypertension     Obesity     last assessed 10/14/17; resolved 11/7/17       Past Surgical History:   Procedure Laterality Date    ABDOMINAL SURGERY      GASTRIC BYPASS    CHOLECYSTECTOMY      at the time of gastric bypass    COLONOSCOPY      CT NEEDLE BIOPSY LYMPH NODE  7/8/2019    GASTRIC BYPASS      HYSTERECTOMY Bilateral     total abdominal with salpingo-oophorectomy    IR PICC LINE  9/27/2019    IR PORT PLACEMENT  7/26/2019    IR PORT REMOVAL  9/20/2019    IR TUBE PLACEMENT NEPHROSTOMY  7/26/2019    OOPHORECTOMY Bilateral     MN LAP, RADICAL HYST W/ TUBE&OV, NODE BX N/A 12/19/2017    Procedure: HYSTERECTOMY LAPAROSCOPIC TOTAL ([address]) W/ ROBOTICS; BILATERAL SALPINGOOPHERECTOMY; LYMPH NODE DISSECTION; lysis of adhesions;  Surgeon: Ruth Nunez MD;  [facility];  Service: Gynecology Oncology    WI LAP,DIAGNOSTIC ABDOMEN N/A 12/19/2017    Procedure: LAPAROSCOPY DIAGNOSTIC;  Surgeon: Ruth Nunez MD;  [facility];  Service: Gynecology Oncology    TONSILLECTOMY      US GUIDED BREAST BIOPSY RIGHT COMPLETE Right 6/28/2019       Meds/Allergies:    Prior to Admission medications    Medication Sig Start Date End Date Taking? Authorizing Provider   acetaminophen (TYLENOL) 325 mg tablet Take 2 tablets (650 mg total) by mouth every 6 (six) hours as needed for mild pain, headaches or fever 10/8/19   Marcha Gitelman, DO   ARIPiprazole (ABILIFY) 10 mg tablet Take 10 mg by mouth daily    Historical Provider, MD   aspirin (ECOTRIN LOW STRENGTH) 81 mg EC tablet Take 81 mg by mouth daily    Historical Provider, MD   benzonatate (TESSALON) 200 MG capsule Take 1 capsule (200 mg total) by mouth 3 (three) times a day as needed for cough 9/29/19   Gay Young MD   ceFAZolin (ANCEF) 2000 mg IVPB Infuse 2,000 mg into a venous catheter every 8 (eight) hours 9/29/19 11/4/19  Gay Young MD   cholecalciferol (VITAMIN D3) 1,000 units tablet Take 1,000 Units by mouth daily    Historical Provider, MD   CRANIAL PROSTHESIS, RX, Please provide patient with extracranial prosthesis while undergoing active chemotherapy treatment    Patient not taking: Reported on 10/15/2019 9/19/19   RAFITA Nava   enoxaparin (LOVENOX) 100 mg/mL Inject 0 9 mL (90 mg total) under the skin every 12 (twelve) hours 9/23/19 10/23/19  Shahnaz Villela MD   folic acid (FOLVITE) 1 mg tablet Take 1 tablet (1 mg total) by mouth daily 10/8/19   Marcha Gitelman, DO   gemfibrozil (LOPID) 600 mg tablet TAKE 1 TABLET BY MOUTH DAILY 7/9/19   Erik Richardson DO   lisinopril (ZESTRIL) 5 mg tablet Take 1 tablet (5 mg total) by mouth daily 10/9/19   Marcha Gitelman,    LORazepam (ATIVAN) 1 mg tablet Take 1 tablet (1 mg total) by mouth every 6 (six) hours as needed for anxiety (or nausea) for up to 10 days 10/8/19 10/18/19  Shaista Montaño DO   MYRBETRIQ 50 MG TB24 TAKE 1 TABLET DAILY 11/26/18   Raven Arteaga MD   omeprazole (PriLOSEC) 20 mg delayed release capsule Take 20 mg by mouth daily    Historical Provider, MD   ondansetron (ZOFRAN) 8 mg tablet Take 1 tablet (8 mg total) by mouth every 8 (eight) hours as needed for nausea or vomiting 7/17/19   RAFITA Iniguez   saccharomyces boulardii (FLORASTOR) 250 mg capsule Take 1 capsule (250 mg total) by mouth 2 (two) times a day 10/8/19   Shaista Montaño DO   venlafaxine [facility]) 75 mg tablet Take 75 mg by mouth 3 (three) times a day      Historical Provider, MD     I have reviewed home medications with patient personally  Allergies:    Allergies   Allergen Reactions    Cephalosporins Rash     Which Cephalosporin reaction was to not specified; however, has tolerated Amoxicillin, Cefazolin, and Cefepime       Social History:     Marital Status: Single   Occupation: non-contributory   Patient Pre-hospital Living Situation: [place]  Patient Pre-hospital Level of Mobility: not assessed   Patient Pre-hospital Diet Restrictions: none   Substance Use History:   Social History     Substance and Sexual Activity   Alcohol Use Not Currently     Social History     Tobacco Use   Smoking Status Never Smoker   Smokeless Tobacco Never Used     Social History     Substance and Sexual Activity   Drug Use No       Family History:    Family History   Problem Relation Age of Onset    Other Mother         dyslipidemia    Ovarian cancer Mother 48    Lymphoma Father     Bone cancer Maternal Grandfather     No Known Problems Sister     No Known Problems Maternal Grandmother     No Known Problems Paternal Grandmother     No Known Problems Paternal Grandfather     No Known Problems Maternal Aunt     No Known Problems Maternal Aunt     No Known Problems Maternal Aunt     No Known Problems Maternal Aunt     No Known Problems Paternal Aunt     No Known Problems Paternal Aunt     No Known Problems Paternal Aunt     No Known Problems Paternal Aunt     No Known Problems Brother     No Known Problems Brother        Physical Exam:     Vitals:   Blood Pressure: 158/66 (10/22/19 0245)  Pulse: 80 (10/22/19 0245)  Temperature: 97 7 °F (36 5 °C) (10/22/19 0041)  Temp Source: Oral (10/22/19 0041)  Respirations: 18 (10/22/19 0245)  Weight - Scale: 92 1 kg (203 lb 0 7 oz) (10/22/19 0041)  SpO2: 95 % (10/22/19 0245)    Physical Exam   Constitutional: She is oriented to person, place, and time  She appears well-developed and well-nourished  No distress  Alopecia  Appearing chronically ill  HENT:   Head: Normocephalic and atraumatic  Mouth/Throat: Oropharynx is clear and moist    Eyes: Pupils are equal, round, and reactive to light  EOM are normal  No scleral icterus  Neck: Neck supple  Cardiovascular: Normal rate, regular rhythm and normal heart sounds  No murmur heard  Pulmonary/Chest: Effort normal and breath sounds normal  No respiratory distress  She has no wheezes  She has no rales  Abdominal: Soft  Bowel sounds are normal  She exhibits no distension  There is no tenderness  There is no rebound and no guarding  Genitourinary:   Genitourinary Comments: Right-sided nephrostomy bag with clear yellow urine in the bag  No evidence of blood in the bag  Musculoskeletal: She exhibits tenderness (right flank, mild  )  She exhibits no deformity  Neurological: She is alert and oriented to person, place, and time  Skin: Skin is warm and dry  Capillary refill takes less than 2 seconds  No rash noted  She is not diaphoretic  No erythema  No pallor  Psychiatric: She has a normal mood and affect  Nursing note and vitals reviewed  Additional Data:     Lab Results: I have personally reviewed pertinent reports        Results from last 7 days   Lab Units 10/22/19  0123   WBC Thousand/uL 6 85   HEMOGLOBIN g/dL 9 7*   HEMATOCRIT % 30 4*   PLATELETS Thousands/uL 212   NEUTROS PCT % 61   LYMPHS PCT % 27   MONOS PCT % 7   EOS PCT % 5     Results from last 7 days   Lab Units 10/22/19  0123   SODIUM mmol/L 143   POTASSIUM mmol/L 2 9*   CHLORIDE mmol/L 107   CO2 mmol/L 27   BUN mg/dL 7   CREATININE mg/dL 0 58*   ANION GAP mmol/L 9   CALCIUM mg/dL 8 5   ALBUMIN g/dL 2 4*   TOTAL BILIRUBIN mg/dL 0 20   ALK PHOS U/L 92   ALT U/L 6*   AST U/L 16   GLUCOSE RANDOM mg/dL 83     Results from last 7 days   Lab Units 10/22/19  0123   INR  1 09             Results from last 7 days   Lab Units 10/22/19  0123   LACTIC ACID mmol/L 0 8       Imaging: I have personally reviewed pertinent reports  CT renal protocol   ED Interpretation by Zak Crowell MD (25/22 6590)   COMPARISON:  CT abdomen/pelvis 10/6/2019  TECHNIQUE: Initial CT of the abdomen and pelvis was performed without intravenous contrast   Subsequent dynamic CT evaluation of the abdomen and pelvis was performed after the administration of intravenous contrast in both nephrographic and delayed    phases after the administration of intravenous contrast   Axial, sagittal, and coronal 2D reformatted images were created from the source data and submitted for interpretation  Radiation dose length product (DLP) for this visit:  6299 mGy-cm    This examination, like all CT scans performed in the [facility], was performed utilizing techniques to minimize radiation dose exposure, including the use of iterative    reconstruction and automated exposure control  IV Contrast:  100 mL of iohexol (OMNIPAQUE)   Enteric Contrast:  Enteric contrast was not administered  FINDINGS:      ABDOMEN      RIGHT KIDNEY AND URETER:   No solid renal mass   No detectable urothelial mass   Exophytic simple renal cyst superior pole of the right kidney  No hydronephrosis or hydroureter     No urinary tract calculi  No perinephric collection  LEFT KIDNEY AND URETER:   No solid renal mass   No detectable urothelial mass  No hydronephrosis or hydroureter  No urinary tract calculi  No perinephric collection  URINARY BLADDER:   No bladder wall mass  No calculi  LOWER CHEST:  Small bilateral pleural effusions  LIVER/BILIARY TREE:  Unremarkable  GALLBLADDER:  No calcified gallstones  No pericholecystic inflammatory change  SPLEEN:  Unremarkable  PANCREAS:  Unremarkable  ADRENAL GLANDS:  Unremarkable  STOMACH AND BOWEL:  Status post gastric bypass      ABDOMINOPELVIC CAVITY:  No ascites   No free intraperitoneal air   Stable right external iliac chain adenopathy measuring up to 1 3 cm   Stable sized right pelvic mass with persistent encasement/abutment of the right iliac vessels and probable occlusion    of the right external iliac vein         VESSELS:  Unremarkable for patient's age  PELVIS      REPRODUCTIVE ORGANS:  Patient is status post hysterectomy  APPENDIX: No findings to suggest appendicitis  ABDOMINAL WALL/INGUINAL REGIONS:  Unremarkable  OSSEOUS STRUCTURES:  No acute fracture or destructive osseous lesion  Impression:        Etiology for patient's hematuria is not identified on this examination      Stable position of right percutaneous nephroureteral catheter without evidence of hydronephrosis  Stable right external iliac chain adenopathy and stable size of right pelvic mass abutting the right iliac vessels   Small bilateral pleural effusions         Workstation performed: [id number]         Final Result by Cholo Brown MD (10/22 1981)      Etiology for patient's hematuria is not identified on this examination      Stable position of right percutaneous nephroureteral catheter without evidence of hydronephrosis        Stable right external iliac chain adenopathy and stable size of right pelvic mass abutting the right iliac vessels  Small bilateral pleural effusions         Workstation performed: [id number]         XR chest 1 view portable   ED Interpretation by Nicki Hoffman MD (10/22 0229)   Cardiomegaly read by me  EKG, Pathology, and Other Studies Reviewed on Admission:   · EKG: NSR, rate 83    Allscripts / Epic Records Reviewed: Yes     ** Please Note: This note has been constructed using a voice recognition system   **

## 2019-10-22 NOTE — SOCIAL WORK
CM received call from [address] [place] [address] in the ED stating that patient was receiving rehab at [facility] and would like to return to complete rehab  CM called [facility] and spoke with Alexis Mckeon who stated that patient can come back to [facility] to call [facility] and set transport

## 2019-10-22 NOTE — ASSESSMENT & PLAN NOTE
· Hemoglobin today above baseline, 9 2  · No more active bleeding from nephrostomy tube at this time  · Patient seen by Urology  Okay for discharge  Will recheck CBC on Friday  As it is, patient's hemoglobin will no doubt trend down as she continues to receive chemotherapy for her endometrial cancer  Her oncologist is following this and she does get periodic outpatient blood transfusions

## 2019-10-22 NOTE — ASSESSMENT & PLAN NOTE
· Sent in by nursing home today after they noticed blood in her nephrostomy bag  · There was blood in the nephrostomy bag upon arrival to the emergency department, now upon my examination the blood is clear yellow  No evidence of blood at this time  Occasional back pain, not having any at this time  · Urinalysis showing blood in the urine, 10-20 white blood cells  No bacteria noted  · Will consult Urology, likely will recommend close monitoring for recurrence of hematuria  · Continuing with Lovenox at this time  · Monitor CBC, hemoglobin is 9 7

## 2019-10-23 LAB — BACTERIA UR CULT: NORMAL

## 2019-10-25 ENCOUNTER — HOSPITAL ENCOUNTER (OUTPATIENT)
Dept: INFUSION CENTER | Facility: CLINIC | Age: 64
Discharge: HOME | End: 2019-10-25
Payer: COMMERCIAL

## 2019-10-25 DIAGNOSIS — R31.9 HEMATURIA: ICD-10-CM

## 2019-10-25 DIAGNOSIS — E87.6 HYPOKALEMIA: Primary | ICD-10-CM

## 2019-10-25 DIAGNOSIS — C54.1 ENDOMETRIAL CANCER (HCC): Chronic | ICD-10-CM

## 2019-10-25 DIAGNOSIS — Z45.2 ENCOUNTER FOR CENTRAL LINE CARE: ICD-10-CM

## 2019-10-25 DIAGNOSIS — R50.9 FEVER: ICD-10-CM

## 2019-10-25 LAB
ALBUMIN SERPL BCP-MCNC: 2.6 G/DL (ref 3.5–5)
ALP SERPL-CCNC: 102 U/L (ref 46–116)
ALT SERPL W P-5'-P-CCNC: 7 U/L (ref 12–78)
ANION GAP SERPL CALCULATED.3IONS-SCNC: 8 MMOL/L (ref 4–13)
AST SERPL W P-5'-P-CCNC: 11 U/L (ref 5–45)
BASOPHILS # BLD AUTO: 0.02 THOUSANDS/ΜL (ref 0–0.1)
BASOPHILS NFR BLD AUTO: 0 % (ref 0–1)
BILIRUB SERPL-MCNC: 0.3 MG/DL (ref 0.2–1)
BUN SERPL-MCNC: 6 MG/DL (ref 5–25)
CALCIUM SERPL-MCNC: 8.9 MG/DL (ref 8.3–10.1)
CHLORIDE SERPL-SCNC: 106 MMOL/L (ref 100–108)
CO2 SERPL-SCNC: 26 MMOL/L (ref 21–32)
CREAT SERPL-MCNC: 0.72 MG/DL (ref 0.6–1.3)
EOSINOPHIL # BLD AUTO: 0.41 THOUSAND/ΜL (ref 0–0.61)
EOSINOPHIL NFR BLD AUTO: 9 % (ref 0–6)
ERYTHROCYTE [DISTWIDTH] IN BLOOD BY AUTOMATED COUNT: 18.9 % (ref 11.6–15.1)
GFR SERPL CREATININE-BSD FRML MDRD: 90 ML/MIN/1.73SQ M
GLUCOSE SERPL-MCNC: 82 MG/DL (ref 65–140)
HCT VFR BLD AUTO: 31.3 % (ref 34.8–46.1)
HGB BLD-MCNC: 9.7 G/DL (ref 11.5–15.4)
IMM GRANULOCYTES # BLD AUTO: 0.02 THOUSAND/UL (ref 0–0.2)
IMM GRANULOCYTES NFR BLD AUTO: 0 % (ref 0–2)
LYMPHOCYTES # BLD AUTO: 1.33 THOUSANDS/ΜL (ref 0.6–4.47)
LYMPHOCYTES NFR BLD AUTO: 28 % (ref 14–44)
MAGNESIUM SERPL-MCNC: 1.5 MG/DL (ref 1.6–2.6)
MCH RBC QN AUTO: 29.4 PG (ref 26.8–34.3)
MCHC RBC AUTO-ENTMCNC: 31 G/DL (ref 31.4–37.4)
MCV RBC AUTO: 95 FL (ref 82–98)
MONOCYTES # BLD AUTO: 0.4 THOUSAND/ΜL (ref 0.17–1.22)
MONOCYTES NFR BLD AUTO: 9 % (ref 4–12)
NEUTROPHILS # BLD AUTO: 2.5 THOUSANDS/ΜL (ref 1.85–7.62)
NEUTS SEG NFR BLD AUTO: 54 % (ref 43–75)
NRBC BLD AUTO-RTO: 0 /100 WBCS
PLATELET # BLD AUTO: 239 THOUSANDS/UL (ref 149–390)
PMV BLD AUTO: 10.4 FL (ref 8.9–12.7)
POTASSIUM SERPL-SCNC: 3.7 MMOL/L (ref 3.5–5.3)
PROT SERPL-MCNC: 6.9 G/DL (ref 6.4–8.2)
RBC # BLD AUTO: 3.3 MILLION/UL (ref 3.81–5.12)
SODIUM SERPL-SCNC: 140 MMOL/L (ref 136–145)
WBC # BLD AUTO: 4.68 THOUSAND/UL (ref 4.31–10.16)

## 2019-10-25 PROCEDURE — 83735 ASSAY OF MAGNESIUM: CPT

## 2019-10-25 PROCEDURE — 85025 COMPLETE CBC W/AUTO DIFF WBC: CPT

## 2019-10-25 PROCEDURE — 80053 COMPREHEN METABOLIC PANEL: CPT

## 2019-10-25 NOTE — PROGRESS NOTES
Patient to [facility] for Lab testing / PICC : Offers no complaints at present time: Right UA Dual Lumen PICC line noted: Good blood return: Labs drawn per MD order

## 2019-10-25 NOTE — PROGRESS NOTES
Tolerated procedure without incident: No adverse reactions noted: Verified follow up appt with patient: AVS given per request

## 2019-10-27 LAB
BACTERIA BLD CULT: NORMAL
BACTERIA BLD CULT: NORMAL

## 2019-10-28 NOTE — DISCHARGE SUMMARY
A note existed under the wrong visit  Yajaira called re: cardiac clearance prior to dental cleaning please call to advise

## 2019-10-29 ENCOUNTER — OFFICE VISIT (OUTPATIENT)
Dept: INFECTIOUS DISEASES | Facility: CLINIC | Age: 64
End: 2019-10-29
Payer: COMMERCIAL

## 2019-10-29 VITALS
BODY MASS INDEX: 39.47 KG/M2 | SYSTOLIC BLOOD PRESSURE: 100 MMHG | DIASTOLIC BLOOD PRESSURE: 64 MMHG | TEMPERATURE: 99 F | WEIGHT: 195.8 LBS | HEART RATE: 84 BPM | HEIGHT: 59 IN

## 2019-10-29 DIAGNOSIS — B95.61 MSSA BACTEREMIA: Primary | ICD-10-CM

## 2019-10-29 DIAGNOSIS — C54.1 ENDOMETRIAL CANCER (HCC): Chronic | ICD-10-CM

## 2019-10-29 DIAGNOSIS — R78.81 MSSA BACTEREMIA: Primary | ICD-10-CM

## 2019-10-29 DIAGNOSIS — I82.4Y1 ACUTE DEEP VEIN THROMBOSIS (DVT) OF PROXIMAL VEIN OF RIGHT LOWER EXTREMITY (HCC): ICD-10-CM

## 2019-10-29 PROCEDURE — 99214 OFFICE O/P EST MOD 30 MIN: CPT | Performed by: PHYSICIAN ASSISTANT

## 2019-10-29 NOTE — PATIENT INSTRUCTIONS
1   Continue IV cefazolin as ordered through 11/04/2019  2  PICC may be maintained after completion of antibiotics if management is taken over by Oncology  3   Continue weekly labs while on IV antibiotics  4    No further infectious disease follow-up is needed at this time

## 2019-10-29 NOTE — ASSESSMENT & PLAN NOTE
MSSA bacteremia secondary to Port-A-Cath infection with associated right IJ septic phlebitis and possible pulmonary septic emboli  Patient now status post removal of Port-A-Cath  TTE negative, TRINH unsuccessful  CT of the chest showed right IJ septic thrombophlebitis  Patient currently at  it to complete a 6 week course of IV cefazolin through 11/04/2019  She is overall doing well  Patient states that her oncology team is requesting her PICC remain in place after completion of antibiotics in anticipation of chemotherapy  I discussed with the patient that the PICC can remain in place as long as her oncologist takes over care of her PICC line  Our office will get in touch with her Oncology providers to touch base regarding her PICC line  Otherwise continue cefazolin as ordered through 11/04/2019  Continue weekly blood work while she is on IV antibiotics  No further infectious disease follow-up needed this time

## 2019-10-29 NOTE — PROGRESS NOTES
Assessment/Plan:    MSSA bacteremia  MSSA bacteremia secondary to Port-A-Cath infection with associated right IJ septic phlebitis and possible pulmonary septic emboli  Patient now status post removal of Port-A-Cath  TTE negative, TRINH unsuccessful  CT of the chest showed right IJ septic thrombophlebitis  Patient currently at [facility] it to complete a 6 week course of IV cefazolin through 11/04/2019  She is overall doing well  Patient states that her oncology team is requesting her PICC remain in place after completion of antibiotics in anticipation of chemotherapy  I discussed with the patient that the PICC can remain in place as long as her oncologist takes over care of her PICC line  Our office will get in touch with her Oncology providers to touch base regarding her PICC line  Otherwise continue cefazolin as ordered through 11/04/2019  Continue weekly blood work while she is on IV antibiotics  No further infectious disease follow-up needed this time  Endometrial cancer [facility])  Chemotherapy currently on hold while on antibiotics       Diagnoses and all orders for this visit:    MSSA bacteremia    Endometrial cancer ([facility] [address] )          Subjective:      Patient ID: Isabelle Wilson is a 60 y o  female  HPI  46-year-old female presents for office follow-up today regarding MSSA bacteremia secondary to Port-A-Cath infection and right IJ septic phlebitis and possible pulmonary septic emboli  Patient did undergo removal of Port-A-Cath while hospitalized  TTE was negative TRINH unsuccessful  CT of the chest showed right IJ septic thrombophlebitis  Patient is currently at [facility] completing a 6 week course of IV cefazolin  She is tolerating the antibiotics without difficulty she denies any fevers chills rash or diarrhea  Her chemotherapy is currently on hold for endometrial cancer    The following portions of the patient's history were reviewed and updated as appropriate: allergies, current medications, past family history, past medical history, past social history, past surgical history and problem list     Review of Systems   Constitutional: Negative for chills and fever  HENT: Negative for mouth sores  Respiratory: Negative for cough and shortness of breath  Cardiovascular: Negative for chest pain, palpitations and leg swelling  Gastrointestinal: Negative for abdominal pain, diarrhea, nausea and vomiting  Genitourinary: Negative for difficulty urinating, dysuria and frequency  Musculoskeletal: Negative for arthralgias, back pain, joint swelling and myalgias  Skin: Negative for rash and wound  Neurological: Negative for headaches  Psychiatric/Behavioral: Negative for behavioral problems and confusion  Objective:      /64   Pulse 84   Temp 99 °F (37 2 °C) (Tympanic)   Ht 4' 11" (1 499 m)   Wt 88 8 kg (195 lb 12 8 oz)   LMP  (LMP Unknown)   BMI 39 55 kg/m²          Physical Exam   Constitutional: She is oriented to person, place, and time  She appears well-developed and well-nourished  No distress  HENT:   Head: Normocephalic and atraumatic  Right Ear: External ear normal    Left Ear: External ear normal    Nose: Nose normal    Mouth/Throat: Oropharynx is clear and moist  No oropharyngeal exudate  Eyes: Conjunctivae are normal  Right eye exhibits no discharge  Left eye exhibits no discharge  No scleral icterus  Neck: Normal range of motion  Neck supple  Cardiovascular: Normal rate, regular rhythm and normal heart sounds  Pulmonary/Chest: Effort normal and breath sounds normal  No stridor  No respiratory distress  She has no wheezes  She has no rales  She exhibits no tenderness  Abdominal: Soft  Bowel sounds are normal  She exhibits no distension  There is no tenderness  Musculoskeletal: Normal range of motion  Neurological: She is alert and oriented to person, place, and time  Skin: Skin is warm and dry  No rash noted  She is not diaphoretic     PICC insertion site without erythema drainage or tenderness   Psychiatric: She has a normal mood and affect  Her behavior is normal    Nursing note and vitals reviewed        Labs:  10/25/2019  WBCs:  4 68  Hemoglobin 9 7  Platelets 305  Creatinine 0 72  LFTs within normal limits

## 2019-10-30 ENCOUNTER — NURSING HOME VISIT (OUTPATIENT)
Dept: GERIATRICS | Facility: OTHER | Age: 64
End: 2019-10-30
Payer: COMMERCIAL

## 2019-10-30 DIAGNOSIS — R78.81 MSSA BACTEREMIA: Primary | ICD-10-CM

## 2019-10-30 DIAGNOSIS — R26.2 AMBULATORY DYSFUNCTION: ICD-10-CM

## 2019-10-30 DIAGNOSIS — I10 BENIGN ESSENTIAL HYPERTENSION: ICD-10-CM

## 2019-10-30 DIAGNOSIS — B95.61 MSSA BACTEREMIA: Primary | ICD-10-CM

## 2019-10-30 PROCEDURE — 99308 SBSQ NF CARE LOW MDM 20: CPT | Performed by: NURSE PRACTITIONER

## 2019-10-30 NOTE — ASSESSMENT & PLAN NOTE
Documented blood pressures appear to be running high however blood pressures have been taken with the smaller cuff  Patient states that when she was at her specialist yesterday and had her blood pressure checked it was approximately 185 systolic  Continue aspirin, lisinopril, gemfibrozil  There will be no change to blood pressure medication until enough blood pressures have been taken using the large cuff to adequately assess blood pressure status  Blood pressures are to be taken only with the large red cuff daily

## 2019-10-30 NOTE — PROGRESS NOTES
[facility]  [address]  [phone]  [place]   POS: 31: SNF/Short Term Rehab        NAME: Alma Melendez  AGE: 60 y o  SEX: female    DATE OF ENCOUNTER: 10/30/2019    Assessment and Plan     MSSA bacteremia  Patient is receiving the cephazolin 2 g IV 3 times a day until November 5, 2019  She is tolerating the treatments well  PICC care  Benign essential hypertension  Documented blood pressures appear to be running high however blood pressures have been taken with the smaller cuff  Patient states that when she was at her specialist yesterday and had her blood pressure checked it was approximately 274 systolic  Continue aspirin, lisinopril, gemfibrozil  There will be no change to blood pressure medication until enough blood pressures have been taken using the large cuff to adequately assess blood pressure status  Blood pressures are to be taken only with the large red cuff daily  Ambulatory dysfunction  Fall precautions  Shaista G[place] for ambulation  Continue therapy which is going well as per therapist     Patient is presently being instructed in self administration of Lovenox  Chief Complaint     Follow-up Note     History of Present Illness     Patient seen and examined at bedside  She is in stable condition  She denies chest pain, shortness of breath, abdominal pain, nausea, vomiting, constipation, diarrhea, pain, headache, dizziness  She uses a walker for ambulation  There have been no recent falls  Therapist reports patient is progressing well  She is receiving antibiotics IV and tolerating well  Blood pressures have been running high however the blood pressures are being taken with a smaller cuff instead of the larger cuff, which is most likely responsible for falsely elevating the blood pressure  Patient states when she was at her specialist yesterday her blood pressure was much lower approximately 175 systolic    Patient reports that the Ace wraps on her legs have been bothering her so she readjust them herself  Unfortunately the Ace wraps are then applied incorrectly  Oral intake has been fair to good  Review of Systems     ROS as per noted in HPI  Objective     Vitals:  Blood pressure 149/80, pulse 80s respirations 16, temperature 98 2°    Physical Exam   Constitutional: She is oriented to person, place, and time  She appears well-developed and well-nourished  HENT:   Head: Normocephalic  Cardiovascular: Normal rate  Exam reveals no gallop and no friction rub  No murmur heard  Pulmonary/Chest: Effort normal and breath sounds normal  No respiratory distress  She has no wheezes  She has no rales  Abdominal: Soft  Bowel sounds are normal  She exhibits no distension  There is no tenderness  There is no rebound and no guarding  Nephrostomy tube right side intact  Musculoskeletal: She exhibits no edema  Ace wraps on bilateral lower extremities however they are not applied correctly due to patient's readjusting them  Neurological: She is alert and oriented to person, place, and time  Skin: Skin is warm and dry  PICC line in the right upper arm  Site appears to be asymptomatic  Nursing note and vitals reviewed  Pertinent Laboratory/Diagnostic Studies:  Labs:  10/24/2019 - hemoglobin 9 4, hematocrit 28 0, WBCs 4 2      Current Medications   Medications were reviewed and updated in facility chart       RAFITA Kelley  10/30/2019 4:17 PM

## 2019-10-30 NOTE — ASSESSMENT & PLAN NOTE
Patient is receiving the cephazolin 2 g IV 3 times a day until November 5, 2019  She is tolerating the treatments well  PICC care

## 2019-10-30 NOTE — ASSESSMENT & PLAN NOTE
Fall precautions  Alex Liao for ambulation    Continue therapy which is going well as per therapist

## 2019-10-31 ENCOUNTER — TELEPHONE (OUTPATIENT)
Dept: INFECTIOUS DISEASES | Facility: CLINIC | Age: 64
End: 2019-10-31

## 2019-10-31 ENCOUNTER — HOSPITAL ENCOUNTER (OUTPATIENT)
Dept: INFUSION CENTER | Facility: CLINIC | Age: 64
Discharge: HOME | End: 2019-10-31
Payer: COMMERCIAL

## 2019-10-31 DIAGNOSIS — Z45.2 ENCOUNTER FOR CENTRAL LINE CARE: ICD-10-CM

## 2019-10-31 DIAGNOSIS — C54.1 ENDOMETRIAL CANCER (HCC): Primary | ICD-10-CM

## 2019-10-31 LAB
ALBUMIN SERPL BCP-MCNC: 2.8 G/DL (ref 3.5–5)
ALP SERPL-CCNC: 98 U/L (ref 46–116)
ALT SERPL W P-5'-P-CCNC: 11 U/L (ref 12–78)
ANION GAP SERPL CALCULATED.3IONS-SCNC: 11 MMOL/L (ref 4–13)
AST SERPL W P-5'-P-CCNC: 16 U/L (ref 5–45)
BASOPHILS # BLD AUTO: 0.02 THOUSANDS/ΜL (ref 0–0.1)
BASOPHILS NFR BLD AUTO: 0 % (ref 0–1)
BILIRUB SERPL-MCNC: 0.2 MG/DL (ref 0.2–1)
BUN SERPL-MCNC: 8 MG/DL (ref 5–25)
CALCIUM SERPL-MCNC: 9.3 MG/DL (ref 8.3–10.1)
CHLORIDE SERPL-SCNC: 105 MMOL/L (ref 100–108)
CO2 SERPL-SCNC: 26 MMOL/L (ref 21–32)
CREAT SERPL-MCNC: 0.55 MG/DL (ref 0.6–1.3)
EOSINOPHIL # BLD AUTO: 0.14 THOUSAND/ΜL (ref 0–0.61)
EOSINOPHIL NFR BLD AUTO: 3 % (ref 0–6)
ERYTHROCYTE [DISTWIDTH] IN BLOOD BY AUTOMATED COUNT: 17.1 % (ref 11.6–15.1)
GFR SERPL CREATININE-BSD FRML MDRD: 101 ML/MIN/1.73SQ M
GLUCOSE SERPL-MCNC: 92 MG/DL (ref 65–140)
HCT VFR BLD AUTO: 31.6 % (ref 34.8–46.1)
HGB BLD-MCNC: 9.7 G/DL (ref 11.5–15.4)
IMM GRANULOCYTES # BLD AUTO: 0.03 THOUSAND/UL (ref 0–0.2)
IMM GRANULOCYTES NFR BLD AUTO: 1 % (ref 0–2)
LYMPHOCYTES # BLD AUTO: 1.5 THOUSANDS/ΜL (ref 0.6–4.47)
LYMPHOCYTES NFR BLD AUTO: 31 % (ref 14–44)
MAGNESIUM SERPL-MCNC: 1.6 MG/DL (ref 1.6–2.6)
MCH RBC QN AUTO: 29.6 PG (ref 26.8–34.3)
MCHC RBC AUTO-ENTMCNC: 30.7 G/DL (ref 31.4–37.4)
MCV RBC AUTO: 96 FL (ref 82–98)
MONOCYTES # BLD AUTO: 0.38 THOUSAND/ΜL (ref 0.17–1.22)
MONOCYTES NFR BLD AUTO: 8 % (ref 4–12)
NEUTROPHILS # BLD AUTO: 2.82 THOUSANDS/ΜL (ref 1.85–7.62)
NEUTS SEG NFR BLD AUTO: 57 % (ref 43–75)
NRBC BLD AUTO-RTO: 0 /100 WBCS
PLATELET # BLD AUTO: 257 THOUSANDS/UL (ref 149–390)
PMV BLD AUTO: 10.6 FL (ref 8.9–12.7)
POTASSIUM SERPL-SCNC: 3.5 MMOL/L (ref 3.5–5.3)
PROT SERPL-MCNC: 7.4 G/DL (ref 6.4–8.2)
RBC # BLD AUTO: 3.28 MILLION/UL (ref 3.81–5.12)
SODIUM SERPL-SCNC: 142 MMOL/L (ref 136–145)
WBC # BLD AUTO: 4.89 THOUSAND/UL (ref 4.31–10.16)

## 2019-10-31 PROCEDURE — 83735 ASSAY OF MAGNESIUM: CPT

## 2019-10-31 PROCEDURE — 85025 COMPLETE CBC W/AUTO DIFF WBC: CPT

## 2019-10-31 PROCEDURE — 80053 COMPREHEN METABOLIC PANEL: CPT

## 2019-10-31 NOTE — TELEPHONE ENCOUNTER
Spoke to James SANCHEZ with Gyn/Onc regarding pt's picc line  She stated they are going to take over care of line on 11/4 and send orders to pt's facility-[facility] [address] [facility]  No need to d/c picc line  Notified Dr Frieda Alvarado of the above plan

## 2019-11-05 ENCOUNTER — NURSING HOME VISIT (OUTPATIENT)
Dept: GERIATRICS | Facility: OTHER | Age: 64
End: 2019-11-05
Payer: COMMERCIAL

## 2019-11-05 DIAGNOSIS — N13.39 OTHER HYDRONEPHROSIS: ICD-10-CM

## 2019-11-05 DIAGNOSIS — I10 BENIGN ESSENTIAL HYPERTENSION: ICD-10-CM

## 2019-11-05 DIAGNOSIS — R26.2 AMBULATORY DYSFUNCTION: ICD-10-CM

## 2019-11-05 DIAGNOSIS — R60.0 BILATERAL LOWER EXTREMITY EDEMA: ICD-10-CM

## 2019-11-05 DIAGNOSIS — B95.61 MSSA BACTEREMIA: Primary | ICD-10-CM

## 2019-11-05 DIAGNOSIS — R78.81 MSSA BACTEREMIA: Primary | ICD-10-CM

## 2019-11-05 DIAGNOSIS — C54.1 ENDOMETRIAL CANCER (HCC): Chronic | ICD-10-CM

## 2019-11-05 DIAGNOSIS — D50.8 OTHER IRON DEFICIENCY ANEMIA: ICD-10-CM

## 2019-11-05 DIAGNOSIS — R53.1 GENERALIZED WEAKNESS: ICD-10-CM

## 2019-11-05 PROCEDURE — 99316 NF DSCHRG MGMT 30 MIN+: CPT | Performed by: NURSE PRACTITIONER

## 2019-11-05 NOTE — PROGRESS NOTES
[facility]  [address]  [phone]  [place]   POS: 31: SNF/Short Term Rehab        NAME: Elliot Melendez  AGE: 60 y o  SEX: female    DATE OF ENCOUNTER: 11/5/2019    Assessment and Plan     1  MSSA bacteremia    2  Hydronephrosis secondary to obstruction:  Status post nephrostomy tube    3  Leg weakness/ambulatory dysfunction    4  Depression/anxiety    5  History of endometrial carcinoma status post chemo    6  Hypertension    7  History of DVT    8  Anemia      1  Discharge home with services    2  Follow up with PCP within 1 week    3  Follow-up with specialist    4  Prescription/paperwork completed    5  Discharge discussed with patient     Chief Complaint   Discharge note      History of Present Illness     Patient to be discharged home with services  She will be living with her sister and using a walker and cane for ambulation  She was seen and examined at bedside  She is in stable condition  She denies chest pain, shortness of breath, abdominal pain, nausea, vomiting, diarrhea, constipation, headache, dizziness, pain  During her stay she received IV antibiotics which she tolerated well  Her PICC line is to remain in until seen by ID  Total encounter time:  40 minutes      Review of Systems     ROS as per noted in HPI  Objective     Vitals:  Blood pressure 109/52, pulse 86, respirations 16, temperature 98 0°  Pulse ox 94% on room air  Physical Exam   Constitutional: She is oriented to person, place, and time  She appears well-developed and well-nourished  Eyes: Pupils are equal, round, and reactive to light  Conjunctivae and EOM are normal    Neck: Neck supple  Cardiovascular: Normal rate and normal heart sounds  Exam reveals no gallop and no friction rub  No murmur heard  Pulmonary/Chest: Effort normal and breath sounds normal  She has no wheezes  She has no rales  Abdominal: Soft  Bowel sounds are normal  There is no tenderness   There is no rebound and no guarding  Nephrostomy tube on the right side  Dressing intact  Musculoskeletal: Normal range of motion  She exhibits no edema  Ace wraps intact bilateral lower extremities  Right leg is larger than left  Neurological: She is alert and oriented to person, place, and time  Skin: Skin is warm and dry  Nursing note and vitals reviewed  Pertinent Laboratory/Diagnostic Studies:  Labs:  10/31/2019-hemoglobin 9 9, hematocrit 29 3, WBCs 4 4, creatinine 0 55, CRP 14      Current Medications   Medications were reviewed and updated in facility chart       RAFITA Kitchen  11/5/2019 5:41 PM

## 2019-11-06 ENCOUNTER — OFFICE VISIT (OUTPATIENT)
Dept: GYNECOLOGIC ONCOLOGY | Facility: CLINIC | Age: 64
End: 2019-11-06
Payer: COMMERCIAL

## 2019-11-06 VITALS
TEMPERATURE: 97.4 F | DIASTOLIC BLOOD PRESSURE: 72 MMHG | BODY MASS INDEX: 38.91 KG/M2 | SYSTOLIC BLOOD PRESSURE: 110 MMHG | WEIGHT: 193 LBS | HEIGHT: 59 IN | HEART RATE: 82 BPM

## 2019-11-06 DIAGNOSIS — B95.61 MSSA BACTEREMIA: ICD-10-CM

## 2019-11-06 DIAGNOSIS — C54.1 ENDOMETRIAL CANCER (HCC): Primary | Chronic | ICD-10-CM

## 2019-11-06 DIAGNOSIS — I82.4Y9 ACUTE DEEP VEIN THROMBOSIS (DVT) OF PROXIMAL VEIN OF LOWER EXTREMITY, UNSPECIFIED LATERALITY (HCC): ICD-10-CM

## 2019-11-06 DIAGNOSIS — R78.81 MSSA BACTEREMIA: ICD-10-CM

## 2019-11-06 PROCEDURE — 99214 OFFICE O/P EST MOD 30 MIN: CPT | Performed by: OBSTETRICS & GYNECOLOGY

## 2019-11-06 NOTE — H&P (VIEW-ONLY)
Assessment/Plan:    Problem List Items Addressed This Visit        Cardiovascular and Mediastinum    Acute deep vein thrombosis (DVT) of proximal vein of lower extremity (HCC)     Continue anticoagulation for life given history of active malignancy  On Lovenox twice per day  Genitourinary    Endometrial cancer ([facility] [address] ) - Primary (Chronic)     She has been cleared to receive cycle 4 of carboplatin AUC 5 and Taxol at 135 mg/m2  Will plan to obtain repeat CT scan after cycle 6  Will continue to check laboratory parameters prior to each infusion  Relevant Orders    IR port Placement       Other    MSSA bacteremia     Completing course of antibiotics  Appreciate infectious disease follow-up and recommendations  Plan to order new Port-A-Cath to be placed  After this is completed, PICC line can be removed  CHIEF COMPLAINT:   Pre chemotherapy visit  Problem:  Cancer Staging  Endometrial cancer [facility])  Staging form: Corpus Uteri - Carcinoma, AJCC 7th Edition  - Clinical stage from 12/19/2017: FIGO Stage IB (T1b, N0, M0) - Signed by Asim Santiago MD on 2/12/2018        Previous therapy:     [facility]    11/17/2017 Initial Diagnosis     Endometrial cancer ([facility] [address] )      12/19/2017 Surgery     Robotic assisted total laparoscopic hysterectomy with bilateral salpingo-oophorectomy and sentinel bilateral pelvic lymph node dissection  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 (4 4 x 3 2 cm tumor, 9 4/15 4mm invasion, NO LVSI, washings revealed atypical cellular changes)      12/19/2017 Genetic Testing     Morrison testing negative      7/8/2019 Recurrence     Presented with right lower extremity DVT and CT demonstrating right pelvic sidewall mass with venous, ureteral and nerve compression causing significant neuropathic pain  Core biopsy demonstrates high-grade carcinoma        7/30/2019 -  Chemotherapy     pegfilgrastim (NEULASTA ONPRO) subcutaneous injection kit 6 mg, 6 mg, Subcutaneous, Once, 2 of 4 cycles  Administration: 6 mg (9/9/2019)  CARBOplatin (PARAPLATIN) 574 2 mg in sodium chloride 0 9 % 250 mL IVPB, 574 2 mg, Intravenous, Once, 4 of 6 cycles  Administration: 574 2 mg (7/30/2019), 553 2 mg (8/19/2019), 678 6 mg (9/9/2019)  PACLitaxel (TAXOL) 337 8 mg in sodium chloride 0 9 % 500 mL chemo IVPB, 175 mg/m2 = 337 8 mg, Intravenous, Once, 4 of 6 cycles  Dose modification: 135 mg/m2 (original dose 175 mg/m2, Cycle 4, Reason: Other (See Comments), Comment: prior hospital admission/complications)  Administration: 337 8 mg (7/30/2019), 327 mg (8/19/2019), 327 mg (9/9/2019)           Patient ID: Brian Grimaldo is a 60 y o  female  HPI  Patient completed intravenous antibiotics, prolonged course for Staph aureus bacteremia  Has PICC line in place in her Port-A-Cath has been removed  Her chemotherapy has been on hold  Prior to this, CT scan after 3 cycles of chemotherapy demonstrated objective partial response  She is here for consideration next cycle of chemotherapy  Denies neuropathy  Ongoing alopecia  Overall strength and stamina improving  Ambulating with walker  The following portions of the patient's history were reviewed and updated as appropriate: allergies, current medications, past family history, past medical history, past social history, past surgical history and problem list     Review of Systems  As above  Twelve point review of systems is otherwise unremarkable    Current Outpatient Medications   Medication Sig Dispense Refill    aspirin (ECOTRIN LOW STRENGTH) 81 mg EC tablet Take 81 mg by mouth daily      acetaminophen (TYLENOL) 325 mg tablet Take 2 tablets (650 mg total) by mouth every 6 (six) hours as needed for mild pain, headaches or fever (Patient not taking: Reported on 11/6/2019) 30 tablet 0    ARIPiprazole (ABILIFY) 10 mg tablet Take 10 mg by mouth daily      benzonatate (TESSALON) 200 MG capsule Take 1 capsule (200 mg total) by mouth 3 (three) times a day as needed for cough 20 capsule 0    cholecalciferol (VITAMIN D3) 1,000 units tablet Take 1,000 Units by mouth daily      CRANIAL PROSTHESIS, RX, Please provide patient with extracranial prosthesis while undergoing active chemotherapy treatment  (Patient not taking: Reported on 10/15/2019) 1 Piece 0    enoxaparin (LOVENOX) 100 mg/mL Inject 0 9 mL (90 mg total) under the skin every 12 (twelve) hours 60 Syringe 3    folic acid (FOLVITE) 1 mg tablet Take 1 tablet (1 mg total) by mouth daily  0    gemfibrozil (LOPID) 600 mg tablet TAKE 1 TABLET BY MOUTH DAILY 90 tablet 1    lisinopril (ZESTRIL) 5 mg tablet Take 1 tablet (5 mg total) by mouth daily  0    LORazepam (ATIVAN) 1 mg tablet Take 1 tablet (1 mg total) by mouth every 6 (six) hours as needed for anxiety (or nausea) for up to 10 days 10 tablet 0    MYRBETRIQ 50 MG TB24 TAKE 1 TABLET DAILY 30 tablet 10    omeprazole (PriLOSEC) 20 mg delayed release capsule Take 20 mg by mouth daily      ondansetron (ZOFRAN) 8 mg tablet Take 1 tablet (8 mg total) by mouth every 8 (eight) hours as needed for nausea or vomiting 30 tablet 1    saccharomyces boulardii (FLORASTOR) 250 mg capsule Take 1 capsule (250 mg total) by mouth 2 (two) times a day  0    venlafaxine (EFFEXOR) 75 mg tablet Take 75 mg by mouth 3 (three) times a day         No current facility-administered medications for this visit  Objective:    Blood pressure 110/72, pulse 82, temperature (!) 97 4 °F (36 3 °C), temperature source Oral, height 4' 11" (1 499 m), weight 87 5 kg (193 lb), not currently breastfeeding  Body mass index is 38 98 kg/m²  Body surface area is 1 82 meters squared  Physical Exam   Constitutional: She is oriented to person, place, and time  She appears well-developed and well-nourished  HENT:   Head: Normocephalic and atraumatic  Mouth/Throat: No oropharyngeal exudate  Eyes: Conjunctivae are normal  Right eye exhibits no discharge   Left eye exhibits no discharge  No scleral icterus  Neck: Normal range of motion  Neck supple  No tracheal deviation present  No thyromegaly present  Cardiovascular: Normal rate, regular rhythm and normal heart sounds  Exam reveals no gallop  No murmur heard  Pulmonary/Chest: Effort normal and breath sounds normal  No respiratory distress  She has no wheezes  She has no rhonchi  She has no rales  Abdominal: Soft  Bowel sounds are normal  She exhibits no distension and no mass  There is no tenderness  There is no rebound and no guarding  Hernia confirmed negative in the right inguinal area and confirmed negative in the left inguinal area  Genitourinary: There is no rash or lesion on the right labia  There is no rash or lesion on the left labia  Uterus is not enlarged and not tender  Cervix exhibits no motion tenderness, no discharge and no friability  Right adnexum displays no mass, no tenderness and no fullness  Left adnexum displays no mass, no tenderness and no fullness  No bleeding in the vagina  Genitourinary Comments: Exam deferred  Musculoskeletal: She exhibits no edema or tenderness  Lymphadenopathy:     She has no cervical adenopathy  Right: No inguinal adenopathy present  Left: No inguinal adenopathy present  Neurological: She is alert and oriented to person, place, and time  Skin: Skin is dry  Psychiatric: She has a normal mood and affect  Her behavior is normal  Judgment and thought content normal    Vitals reviewed      Shaggy Townsend MD, [address]  11/6/2019  1:31 PM

## 2019-11-06 NOTE — ASSESSMENT & PLAN NOTE
She has been cleared to receive cycle 4 of carboplatin AUC 5 and Taxol at 135 mg/m2  Will plan to obtain repeat CT scan after cycle 6  Will continue to check laboratory parameters prior to each infusion

## 2019-11-06 NOTE — PROGRESS NOTES
Outpatient Oncology Nutrition Consult  Type of Consult: Follow Up  Care Location: [facility]   Nutrition Assessment:  PMH: anemia, depression, DM, HLD, HTN, obesity, RNYGB (2001 at [address])  Oncology Diagnosis & Treatments: Endometrial cancer  S/p surgery 12/19/17  Recurrence 7/8/19  Undergoing chemotherapy (started 7/30/19; carboplatin, taxol)  Then for potential radiation therapy  Endometrial cancer ([facility] [address] )    11/17/2017 Initial Diagnosis     Endometrial cancer ([facility] [address] )      12/19/2017 Surgery     Robotic assisted total laparoscopic hysterectomy with bilateral salpingo-oophorectomy and sentinel bilateral pelvic lymph node dissection  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 (4 4 x 3 2 cm tumor, 9 4/15 4mm invasion, NO LVSI, washings revealed atypical cellular changes)      12/19/2017 Genetic Testing     Morrison testing negative      7/8/2019 Recurrence     Presented with right lower extremity DVT and CT demonstrating right pelvic sidewall mass with venous, ureteral and nerve compression causing significant neuropathic pain  Core biopsy demonstrates high-grade carcinoma        7/30/2019 -  Chemotherapy     palonosetron (ALOXI) injection 0 25 mg, 0 25 mg, Intravenous, Once, 4 of 6 cycles  Administration: 0 25 mg (7/30/2019), 0 25 mg (8/19/2019), 0 25 mg (9/9/2019)  pegfilgrastim (NEULASTA ONPRO) subcutaneous injection kit 6 mg, 6 mg, Subcutaneous, Once, 2 of 4 cycles  Administration: 6 mg (9/9/2019)  fosaprepitant (EMEND) 150 mg in sodium chloride 0 9 % 255 mL IVPB, 150 mg, Intravenous, Once, 4 of 6 cycles  Administration: 150 mg (7/30/2019), 150 mg (8/19/2019), 150 mg (9/9/2019)  CARBOplatin (PARAPLATIN) 574 2 mg in sodium chloride 0 9 % 250 mL IVPB, 574 2 mg, Intravenous, Once, 4 of 6 cycles  Administration: 574 2 mg (7/30/2019), 553 2 mg (8/19/2019), 678 6 mg (9/9/2019)  PACLitaxel (TAXOL) 337 8 mg in sodium chloride 0 9 % 500 mL chemo IVPB, 175 mg/m2 = 337 8 mg, Intravenous, Once, 4 of 6 cycles  Dose modification: 135 mg/m2 (original dose 175 mg/m2, Cycle 4, Reason: Other (See Comments), Comment: prior hospital admission/complications)  Administration: 337 8 mg (7/30/2019), 327 mg (8/19/2019), 327 mg (9/9/2019)       Past Medical History:   Diagnosis Date    Anemia     Chemotherapy follow-up examination     Depression     Diabetes mellitus ([facility] [address] )     Endometrial cancer ([facility] [address] ) 12/2017    Hyperglycemia     vx type 2 dm -- last assessed 4/1/14; resolved 11/7/17    Hyperlipidemia     Hypertension     Obesity     last assessed 10/14/17; resolved 11/7/17     Past Surgical History:   Procedure Laterality Date    ABDOMINAL SURGERY      GASTRIC BYPASS    CHOLECYSTECTOMY      at the time of gastric bypass    COLONOSCOPY      CT NEEDLE BIOPSY LYMPH NODE  7/8/2019    GASTRIC BYPASS      HYSTERECTOMY Bilateral     total abdominal with salpingo-oophorectomy    IR PICC LINE  9/27/2019    IR PORT PLACEMENT  7/26/2019    IR PORT REMOVAL  9/20/2019    IR TUBE PLACEMENT NEPHROSTOMY  7/26/2019    OOPHORECTOMY Bilateral     MA LAP, RADICAL HYST W/ TUBE&OV, NODE BX N/A 12/19/2017    Procedure: HYSTERECTOMY LAPAROSCOPIC TOTAL ([address]) W/ ROBOTICS; BILATERAL SALPINGOOPHERECTOMY; LYMPH NODE DISSECTION; lysis of adhesions;  Surgeon: Xander Cartwright MD;  Location: [facility];  Service: Gynecology Oncology    MA LAP,DIAGNOSTIC ABDOMEN N/A 12/19/2017    Procedure: LAPAROSCOPY DIAGNOSTIC;  Surgeon: Xander Cartwright MD;  Location: [facility];  Service: Gynecology Oncology    TONSILLECTOMY      US GUIDED BREAST BIOPSY RIGHT COMPLETE Right 6/28/2019       Review of Medications:   Vitamins, Supplements and Herbals: yes: Vitamin D and Folic Acid; not taking rest of post-RNYGB vitamin regimen, reviewed recommendations again       Current Outpatient Medications:     acetaminophen (TYLENOL) 325 mg tablet, Take 2 tablets (650 mg total) by mouth every 6 (six) hours as needed for mild pain, headaches or fever (Patient not taking: Reported on 11/6/2019), Disp: 30 tablet, Rfl: 0    ARIPiprazole (ABILIFY) 10 mg tablet, Take 10 mg by mouth daily, Disp: , Rfl:     aspirin (ECOTRIN LOW STRENGTH) 81 mg EC tablet, Take 81 mg by mouth daily, Disp: , Rfl:     benzonatate (TESSALON) 200 MG capsule, Take 1 capsule (200 mg total) by mouth 3 (three) times a day as needed for cough (Patient not taking: Reported on 11/7/2019), Disp: 20 capsule, Rfl: 0    cholecalciferol (VITAMIN D3) 1,000 units tablet, Take 1,000 Units by mouth daily, Disp: , Rfl:     CRANIAL PROSTHESIS, RX,, Please provide patient with extracranial prosthesis while undergoing active chemotherapy treatment   (Patient not taking: Reported on 10/15/2019), Disp: 1 Piece, Rfl: 0    enoxaparin (LOVENOX) 100 mg/mL, Inject 0 9 mL (90 mg total) under the skin every 12 (twelve) hours, Disp: 60 Syringe, Rfl: 3    folic acid (FOLVITE) 1 mg tablet, Take 1 tablet (1 mg total) by mouth daily, Disp: , Rfl: 0    gemfibrozil (LOPID) 600 mg tablet, TAKE 1 TABLET BY MOUTH DAILY, Disp: 90 tablet, Rfl: 1    lisinopril (ZESTRIL) 5 mg tablet, Take 1 tablet (5 mg total) by mouth daily, Disp: , Rfl: 0    LORazepam (ATIVAN) 1 mg tablet, Take 1 tablet (1 mg total) by mouth every 6 (six) hours as needed for anxiety (or nausea) for up to 10 days, Disp: 10 tablet, Rfl: 0    MYRBETRIQ 50 MG TB24, TAKE 1 TABLET DAILY, Disp: 30 tablet, Rfl: 10    omeprazole (PriLOSEC) 20 mg delayed release capsule, Take 20 mg by mouth daily, Disp: , Rfl:     ondansetron (ZOFRAN) 8 mg tablet, Take 1 tablet (8 mg total) by mouth every 8 (eight) hours as needed for nausea or vomiting (Patient not taking: Reported on 11/7/2019), Disp: 30 tablet, Rfl: 1    saccharomyces boulardii (FLORASTOR) 250 mg capsule, Take 1 capsule (250 mg total) by mouth 2 (two) times a day, Disp: , Rfl: 0    venlafaxine (EFFEXOR) 75 mg tablet, Take 75 mg by mouth 3 (three) times a day  , Disp: , Rfl:   No current facility-administered medications for this visit  Facility-Administered Medications Ordered in Other Visits:     CARBOplatin (PARAPLATIN) 506 mg in sodium chloride 0 9 % 250 mL IVPB, 506 mg, Intravenous, Once, Vivienne Velazquez MD    dexamethasone (DECADRON) 20 mg in sodium chloride 0 9 % 52 mL IVPB, 20 mg, Intravenous, Once, Yadira Sanchez MD, Last Rate: 156 mL/hr at 11/11/19 0825, 20 mg at 11/11/19 0825    diphenhydrAMINE (BENADRYL) 25 mg in sodium chloride 0 9 % 50 mL IVPB, 25 mg, Intravenous, Once, Vivienne Velazquez MD    famotidine (PEPCID) 20 mg in sodium chloride 0 9 % 52 mL IVPB, 20 mg, Intravenous, Once, Vivienne Velazquez MD    fosaprepitant (EMEND) 150 mg in sodium chloride 0 9 % 255 mL IVPB, 150 mg, Intravenous, Once, Vivienne Velazquez MD    PACLitaxel (TAXOL) 252 6 mg in sodium chloride 0 9 % 500 mL chemo IVPB, 135 mg/m2 (Treatment Plan Recorded), Intravenous, Once, Vivienne Velazquez MD    palonosetron (ALOXI) injection 0 25 mg, 0 25 mg, Intravenous, Once, Vivienne Velazquez MD    pegfilgrastim (NEULASTA ONPRO) subcutaneous injection kit 6 mg, 6 mg, Subcutaneous, Once, Yadira Sanchez MD    Most Recent Lab Results: Does not check BG    Lab Results   Component Value Date    WBC 6 44 11/07/2019    IRON 24 (L) 10/02/2019    TIBC 138 (L) 10/02/2019    FERRITIN 1,042 (H) 10/02/2019    CHOL 183 10/27/2017    TRIG 88 11/01/2018    HDL 59 11/01/2018    LDLCALC 106 (H) 10/27/2017    ALT 13 11/07/2019    AST 16 11/07/2019     10/27/2017     05/12/2017    K 4 0 11/07/2019    K 3 5 10/31/2019    BUN 11 11/07/2019    BUN 8 10/31/2019    CREATININE 0 81 11/07/2019    CREATININE 0 55 (L) 10/31/2019    PHOS 3 2 07/08/2019    GLUCOSE 219 (H) 12/19/2017    GLUCOSE 111 (H) 10/27/2017    HGBA1C 6 2 08/28/2019    HGBA1C 5 5 11/01/2018    HGBA1C 5 6 03/12/2018    CALCIUM 9 1 11/07/2019    FOLATE 5 4 10/06/2019    MG 1 5 (L) 11/07/2019       Anthropometric Measurements:   Height: 59"  Ht Readings from Last 1 Encounters:   11/11/19 4' 11 02" (1 499 m)     -Weight History:   Usual Weight: 270# before RNYGB in 2001; lowest post-op wt: 200#; wt typically fluctuates between 215-230#  Ideal Body Weight: 95#  Wt Readings from Last 20 Encounters:   11/11/19 86 2 kg (190 lb)   11/07/19 86 7 kg (191 lb 3 2 oz)   11/06/19 87 5 kg (193 lb)   10/29/19 88 8 kg (195 lb 12 8 oz)   10/22/19 92 1 kg (203 lb 0 7 oz)   10/17/19 90 7 kg (200 lb)   10/15/19 90 7 kg (200 lb)   10/02/19 89 kg (196 lb 3 4 oz)   09/29/19 89 kg (196 lb 3 4 oz)   09/28/19 88 9 kg (196 lb)   09/12/19 91 2 kg (201 lb)   09/09/19 93 4 kg (206 lb)   09/06/19 93 9 kg (207 lb)   09/04/19 93 4 kg (206 lb)   08/28/19 88 kg (194 lb)   08/19/19 90 7 kg (200 lb)   08/02/19 94 3 kg (207 lb 12 8 oz)   08/01/19 95 9 kg (211 lb 8 oz)   07/30/19 96 6 kg (213 lb)   07/26/19 100 kg (221 lb)     Weight Changes: loss of 5 8# (3%) in 2 weeks (significant) and loss of 10# (5%) in 1 month (significant); reports that she gets swelling in her right foot which has improved over the past few weeks  States she has been losing 10# every month since her cancer dx in June 2019  Estimated body mass index is 38 35 kg/m² as calculated from the following:    Height as of an earlier encounter on 11/11/19: 4' 11 02" (1 499 m)  Weight as of an earlier encounter on 11/11/19: 86 2 kg (190 lb)       Nutrition-Focused Physical Findings: none observed     Food/Nutrition-Related History & Client/Social History:    Current Nutrition Impact Symptoms:  [] Nausea  [] Reduced Appetite  [] Acid Reflux    [] Vomiting - occasional dry heaves, possibly due to taking medications on an empty stomach [x] Unintended Wt Loss - ongoing since cancer dx [] Malabsorption    [] Diarrhea  [] Unintended Wt Gain  [] Dumping Syndrome   [] Constipation - BM's now daily and solid, on a probiotic which she feels is helping [] Thick Mucous/Secretions  [] Abdominal Pain    [] Dysgeusia (Altered Taste) [x] Xerostomia (Dry Mouth) - improved; resumed Biot[employer] toothpaste use; has rinse at home [] Gas    [] Dysosmia (Altered Smell)  [] Babak Huang  [] Difficulty Chewing    [] Oral Mucositis (Sore Mouth)  [] Fatigue   [] Other:    [] Odynophagia   [] Esophagitis  [] Other:    [] Dysphagia - coughing while eating during hospitalizations; Reports this is now resolved  -State she saw GI on 11/7/19, no changes made as she is doing well  -EGD 9/25/19: "Findings consistent with previous gastric bypass surgery, no mucosal lesion, nor obstruction in the esophagus  Recommendations: Patient patient continues to have cough after eating, will plan for outpatient esophageal manometry " [] Early Satiety  [] No Problems Eating      Food Allergies: yes: had skin patch testing which showed allergy to strawberries, but is able to eat strawberries without any side effects  Food Intolerances: no    Current Diet: Regular Diet, No Restrictions; avoids high sugar foods/fluids as they trigger nausea since RNYGB  Current Nutrition Intake: Increased since last visit  Appetite: Good; appetite tends to be better earlier in the day  Nutrition Route: PO  Meal planning/preparation mainly done by: mother does cooking; sister does shopping  Activity level: states she has been much more active since she moved in with her sister 1 week ago, also doing home PT exercises  Social Hx: 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  Sister lives in [place]  Brother lives in [facility]  Discharged from [facility] on 11/6/19  Now living with sister and mother      24 Hr Diet Recall: has increased F/V intake since last visit  Breakfast: 1 cup frosted flakes with 2% milk, water; sometimes has cereal, protein bar, and then will go to [employer] and have a bagel or eggs  1 hr later: ham and cheese souffle from [employer]: salami and cheese sandwich on 2 slices of wheat bread with wise; 4 oz pears, 2 [employer] oranges, 1 snack pack goldfish crackers, [employer]: leftovers from lunch, especially if goes out to eat for lunch  Ex: cheesesteak flatbread or 2 slices of pizza  Dinner: meat (roasted chicken), mashed potatoes, mixed vegetables, vegetable salad with [employer] ranch dressing, water  HS snack: [employer] or fruit and Greek yogurt    Beverages: water (16 9 oz x4-5),  unsweetened iced tea with Splenda (24 oz x1; suggested making homemade decaf iced tea as she states her nephrostomy bag fills up too quickly with caffeine), 2% milk (with cereal; dislikes whole milk); does not drink coffee or alcohol  Does not separate fluids from solids, does not have discomfort while eating and drinking at the same time  Supplements: has not started [employer] yet, says she has been too busy   None   Dislikes    11/11/19 Re-Estimated Nutrition Needs: (based on 54kg/ 118 8# adjusted wt)    Energy Needs: 3436-4874 kcal/day (35-40 kcal/kg)  Protein Needs:  grams/day (1 5-2 g/kg)  Fluid Needs: 0432-0689 mL/day (1 mL/kcal) - 63-72 oz    Discussion/Summary: Aruna Rick is here today for chemotherapy and RD follow up  She reports that she was discharged from [facility] last week and is now living at her sister's house with her mother  She has had a 3% wt loss over the past 2 weeks and a 5% wt loss over the past 1 month (significant)  She reports a good appetite and that she is eating small, frequent meals  She says that her appetite tends to be better earlier in the day and she tries to maximize her intake during this time  She has not started her [employer] supplement as discussed last visit  She is hydrating well  Her constipation has resolved  She continues to deny dysphagia  She does report dry heaves and notes that it tends to happen when taking her medications, upon further questioning she is taking her medications on an empty stomach    Encouraged her to always take her medicine with food unless otherwise instructed  She has been much more active since coming home and is doing a home PT program   She has been having swelling in her right foot since her DVT and says the swelling has improved over the past couple of weeks  Her weight loss is likely multifactorial (fluid loss, increased activity, change in diet from SNF to home, cancer related)  She has not started her vitamin and mineral supplementation as recommended for post-RNYGB, but now that she is home from [place], she says she will start  We had a lengthy discussion on how to increase kcal and protein intake to avoid further wt loss  She was agreeable to making some adjustments to her eating pattern based on our discussion  Discussed/Reviewed:   · weight management  · indications & use of oral nutrition supplements - [place] Instant Breakfast or homemade shakes/smoothies 1-2x/day  · how to modify foods for anticipated nutrition impact symptoms pt may experience during CA tx  · high protein foods to include at all meals and snacks [place] yogurt with fruit, trail mix, oral nutrition supplements, eggs, meats, poultry, fish, cheese sticks, pudding, peanut butter, nuts, rolled up chicken/turkey/ham, hummus  · ways to increase overall calorie intake - add butter and cheese to potatoes and vegetables; add cheese/hard boiled eggs/extra dressing to salads; eat 6 small meals each day, do not skip meals  · encouraged eating every 2-3 hours (5-6 small meals/day)  · adequate hydation & tips to increase overall fluid intake    · MNT for: RNY Gastric Bypass, wt loss  · recipe suggestions/resources   · Weigh self on home scale M-W-F mornings  All questions and concerns addressed during todays visit  Joe Rios has RD contact information  Nutrition Diagnosis:  · Inadequate Energy Intake related to physiological causes, disease state and treatment related issues as evidenced by food recall, wt loss and discussion with pt and/or family    · Increased Nutrient Needs (kcal & pro) related to increased demand for nutrients and disease state as evidenced by cancer dx and pt undergoing tx for cancer  Intervention & Recommendations:  Topics addressed: Nutrition education, Balance/Variety, Meals & Snacks, Meal planning, Choosing high protein meals/snacks, Meal pattern: eating small/frequent meals (every 2-3 hours), Nutrition Symptom Management, Adequate Hydration, Medical Food Supplements, Nutrition-Related Medication Management, Vitamin & Mineral Supplements and Weight Management    Barriers: None  Readiness to change: action  Comprehension: verbalizes understanding  Expected Compliance: good    Materials Provided: not applicable      Monitoring & Evaluation:  Dietitian to Monitor: Food and Nutrition Intake, Nutrtion Impact Symptoms, Body Weight, Vitamin/Mineral Intake and Biochemical Data     Goals:  · weight stabilization  · adequate nutrition related symptom management  · pt to meet >/=75% estimated nutrition needs daily  · start vitamin and mineral supplementation for RNYGB    · Progress Towards Goals: Progressing and Not Met     Nutrition Rx & Recommendations:  · Diet: High Calorie, High Protein (for high calorie foods see pages 52-53, and for high protein foods see pages 49-51 in your Eating Hints book)  · Nutrition Supplements: - [place] Instant Breakfast or homemade shakes/smoothies 1-2x/day  May use homemade shakes/smoothies as desired  If using a pre-made shake/bar, choose ones with >300 calories and >10 grams protein (ex  Ensure Enlive, Ensure Plus, Boost Plus, Boost Very High Calorie, etc )  · Small, frequent meals/snacks may be easier to tolerate than 3 large daily meals  Aim for 5-6 small meals per day (every 2-3 hours)  · Include protein at all meals/snacks  · Stay hydrated by sipping fluids of choice/tolerance throughout the day  · Refer to Eating Hints book for other meal/snack ideas and symptom management  · Weigh yourself regularly   If you notice weight loss, make an effort to increase your daily food/calorie intake  If you continue to notice loss after these efforts, reach out to your dietitian to establish a plan to stabilize weight  · High calorie foods to try: - add butter and cheese to potatoes and vegetables; add cheese/hard boiled eggs/extra dressing to salads; eat 6 small meals each day, do not skip meals  (see pages 52-53 in your Eating Hints book)  · High protein foods to try: [place] yogurt with fruit, trail mix, oral nutrition supplements, eggs, meats, poultry, fish, cheese sticks, pudding, peanut butter, nuts, rolled up chicken/turkey/ham, hummus (see pages 49-51 in your Eating Hints book)   · Do not skip meals  Eat 6 times per day  · Weigh yourself on Monday, Wednesday, and Friday mornings    · Lifetime supplements recommended for Gastric Bypass:  · Multivitamin BID  · Iron daily  · Vitamin D daily  · Calcium Citrate 500 mg TID (do not take with iron, needs to be 2 hrs apart)  · B12 1000 mcg daily    Follow Up Plan: 11/27/19 at 10am prior to bloodwork; states she will be done chemotherapy at the end of December, then will be getting RT; will coordinate follow ups based on tx plan  Recommend Referral to Other Providers: none at this time

## 2019-11-06 NOTE — PROGRESS NOTES
Assessment/Plan:    Problem List Items Addressed This Visit        Cardiovascular and Mediastinum    Acute deep vein thrombosis (DVT) of proximal vein of lower extremity (HCC)     Continue anticoagulation for life given history of active malignancy  On Lovenox twice per day  Genitourinary    Endometrial cancer ([facility] [address] ) - Primary (Chronic)     She has been cleared to receive cycle 4 of carboplatin AUC 5 and Taxol at 135 mg/m2  Will plan to obtain repeat CT scan after cycle 6  Will continue to check laboratory parameters prior to each infusion  Relevant Orders    IR port Placement       Other    MSSA bacteremia     Completing course of antibiotics  Appreciate infectious disease follow-up and recommendations  Plan to order new Port-A-Cath to be placed  After this is completed, PICC line can be removed  CHIEF COMPLAINT:   Pre chemotherapy visit  Problem:  Cancer Staging  Endometrial cancer [facility])  Staging form: Corpus Uteri - Carcinoma, AJCC 7th Edition  - Clinical stage from 12/19/2017: FIGO Stage IB (T1b, N0, M0) - Signed by Chris Payton MD on 2/12/2018        Previous therapy:     Endometrial cancer ([facility] [address] )    11/17/2017 Initial Diagnosis     Endometrial cancer ([facility] [address] )      12/19/2017 Surgery     Robotic assisted total laparoscopic hysterectomy with bilateral salpingo-oophorectomy and sentinel bilateral pelvic lymph node dissection  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 (4 4 x 3 2 cm tumor, 9 4/15 4mm invasion, NO LVSI, washings revealed atypical cellular changes)      12/19/2017 Genetic Testing     Morrison testing negative      7/8/2019 Recurrence     Presented with right lower extremity DVT and CT demonstrating right pelvic sidewall mass with venous, ureteral and nerve compression causing significant neuropathic pain  Core biopsy demonstrates high-grade carcinoma        7/30/2019 -  Chemotherapy     pegfilgrastim (NEULASTA ONPRO) subcutaneous injection kit 6 mg, 6 mg, Subcutaneous, Once, 2 of 4 cycles  Administration: 6 mg (9/9/2019)  CARBOplatin (PARAPLATIN) 574 2 mg in sodium chloride 0 9 % 250 mL IVPB, 574 2 mg, Intravenous, Once, 4 of 6 cycles  Administration: 574 2 mg (7/30/2019), 553 2 mg (8/19/2019), 678 6 mg (9/9/2019)  PACLitaxel (TAXOL) 337 8 mg in sodium chloride 0 9 % 500 mL chemo IVPB, 175 mg/m2 = 337 8 mg, Intravenous, Once, 4 of 6 cycles  Dose modification: 135 mg/m2 (original dose 175 mg/m2, Cycle 4, Reason: Other (See Comments), Comment: prior hospital admission/complications)  Administration: 337 8 mg (7/30/2019), 327 mg (8/19/2019), 327 mg (9/9/2019)           Patient ID: Harmony Spears is a 60 y o  female  HPI  Patient completed intravenous antibiotics, prolonged course for Staph aureus bacteremia  Has PICC line in place in her Port-A-Cath has been removed  Her chemotherapy has been on hold  Prior to this, CT scan after 3 cycles of chemotherapy demonstrated objective partial response  She is here for consideration next cycle of chemotherapy  Denies neuropathy  Ongoing alopecia  Overall strength and stamina improving  Ambulating with walker  The following portions of the patient's history were reviewed and updated as appropriate: allergies, current medications, past family history, past medical history, past social history, past surgical history and problem list     Review of Systems  As above  Twelve point review of systems is otherwise unremarkable    Current Outpatient Medications   Medication Sig Dispense Refill    aspirin (ECOTRIN LOW STRENGTH) 81 mg EC tablet Take 81 mg by mouth daily      acetaminophen (TYLENOL) 325 mg tablet Take 2 tablets (650 mg total) by mouth every 6 (six) hours as needed for mild pain, headaches or fever (Patient not taking: Reported on 11/6/2019) 30 tablet 0    ARIPiprazole (ABILIFY) 10 mg tablet Take 10 mg by mouth daily      benzonatate (TESSALON) 200 MG capsule Take 1 capsule (200 mg total) by mouth 3 (three) times a day as needed for cough 20 capsule 0    cholecalciferol (VITAMIN D3) 1,000 units tablet Take 1,000 Units by mouth daily      CRANIAL PROSTHESIS, RX, Please provide patient with extracranial prosthesis while undergoing active chemotherapy treatment  (Patient not taking: Reported on 10/15/2019) 1 Piece 0    enoxaparin (LOVENOX) 100 mg/mL Inject 0 9 mL (90 mg total) under the skin every 12 (twelve) hours 60 Syringe 3    folic acid (FOLVITE) 1 mg tablet Take 1 tablet (1 mg total) by mouth daily  0    gemfibrozil (LOPID) 600 mg tablet TAKE 1 TABLET BY MOUTH DAILY 90 tablet 1    lisinopril (ZESTRIL) 5 mg tablet Take 1 tablet (5 mg total) by mouth daily  0    LORazepam (ATIVAN) 1 mg tablet Take 1 tablet (1 mg total) by mouth every 6 (six) hours as needed for anxiety (or nausea) for up to 10 days 10 tablet 0    MYRBETRIQ 50 MG TB24 TAKE 1 TABLET DAILY 30 tablet 10    omeprazole (PriLOSEC) 20 mg delayed release capsule Take 20 mg by mouth daily      ondansetron (ZOFRAN) 8 mg tablet Take 1 tablet (8 mg total) by mouth every 8 (eight) hours as needed for nausea or vomiting 30 tablet 1    saccharomyces boulardii (FLORASTOR) 250 mg capsule Take 1 capsule (250 mg total) by mouth 2 (two) times a day  0    venlafaxine (EFFEXOR) 75 mg tablet Take 75 mg by mouth 3 (three) times a day         No current facility-administered medications for this visit  Objective:    Blood pressure 110/72, pulse 82, temperature (!) 97 4 °F (36 3 °C), temperature source Oral, height 4' 11" (1 499 m), weight 87 5 kg (193 lb), not currently breastfeeding  Body mass index is 38 98 kg/m²  Body surface area is 1 82 meters squared  Physical Exam   Constitutional: She is oriented to person, place, and time  She appears well-developed and well-nourished  HENT:   Head: Normocephalic and atraumatic  Mouth/Throat: No oropharyngeal exudate  Eyes: Conjunctivae are normal  Right eye exhibits no discharge   Left eye exhibits no discharge  No scleral icterus  Neck: Normal range of motion  Neck supple  No tracheal deviation present  No thyromegaly present  Cardiovascular: Normal rate, regular rhythm and normal heart sounds  Exam reveals no gallop  No murmur heard  Pulmonary/Chest: Effort normal and breath sounds normal  No respiratory distress  She has no wheezes  She has no rhonchi  She has no rales  Abdominal: Soft  Bowel sounds are normal  She exhibits no distension and no mass  There is no tenderness  There is no rebound and no guarding  Hernia confirmed negative in the right inguinal area and confirmed negative in the left inguinal area  Genitourinary: There is no rash or lesion on the right labia  There is no rash or lesion on the left labia  Uterus is not enlarged and not tender  Cervix exhibits no motion tenderness, no discharge and no friability  Right adnexum displays no mass, no tenderness and no fullness  Left adnexum displays no mass, no tenderness and no fullness  No bleeding in the vagina  Genitourinary Comments: Exam deferred  Musculoskeletal: She exhibits no edema or tenderness  Lymphadenopathy:     She has no cervical adenopathy  Right: No inguinal adenopathy present  Left: No inguinal adenopathy present  Neurological: She is alert and oriented to person, place, and time  Skin: Skin is dry  Psychiatric: She has a normal mood and affect  Her behavior is normal  Judgment and thought content normal    Vitals reviewed      Joseline Boo MD, [address]  11/6/2019  1:31 PM

## 2019-11-06 NOTE — ASSESSMENT & PLAN NOTE
Completing course of antibiotics  Appreciate infectious disease follow-up and recommendations  Plan to order new Port-A-Cath to be placed  After this is completed, PICC line can be removed

## 2019-11-07 ENCOUNTER — HOSPITAL ENCOUNTER (OUTPATIENT)
Dept: INFUSION CENTER | Facility: CLINIC | Age: 64
Discharge: HOME | End: 2019-11-07
Payer: COMMERCIAL

## 2019-11-07 ENCOUNTER — OFFICE VISIT (OUTPATIENT)
Dept: GASTROENTEROLOGY | Facility: CLINIC | Age: 64
End: 2019-11-07
Payer: COMMERCIAL

## 2019-11-07 VITALS
SYSTOLIC BLOOD PRESSURE: 133 MMHG | HEART RATE: 82 BPM | RESPIRATION RATE: 18 BRPM | OXYGEN SATURATION: 98 % | DIASTOLIC BLOOD PRESSURE: 60 MMHG

## 2019-11-07 VITALS
HEART RATE: 84 BPM | HEIGHT: 59 IN | DIASTOLIC BLOOD PRESSURE: 77 MMHG | BODY MASS INDEX: 38.55 KG/M2 | SYSTOLIC BLOOD PRESSURE: 129 MMHG | WEIGHT: 191.2 LBS | TEMPERATURE: 96.1 F

## 2019-11-07 DIAGNOSIS — C54.1 ENDOMETRIAL CANCER (HCC): Primary | Chronic | ICD-10-CM

## 2019-11-07 DIAGNOSIS — R11.0 CHEMOTHERAPY-INDUCED NAUSEA: ICD-10-CM

## 2019-11-07 DIAGNOSIS — T45.1X5A CHEMOTHERAPY-INDUCED NAUSEA: ICD-10-CM

## 2019-11-07 DIAGNOSIS — Z45.2 ENCOUNTER FOR CENTRAL LINE CARE: ICD-10-CM

## 2019-11-07 DIAGNOSIS — R19.4 CHANGE IN BOWEL HABITS: ICD-10-CM

## 2019-11-07 DIAGNOSIS — R13.10 DYSPHAGIA, UNSPECIFIED TYPE: Primary | ICD-10-CM

## 2019-11-07 LAB
ALBUMIN SERPL BCP-MCNC: 3.1 G/DL (ref 3.5–5)
ALP SERPL-CCNC: 96 U/L (ref 46–116)
ALT SERPL W P-5'-P-CCNC: 13 U/L (ref 12–78)
ANION GAP SERPL CALCULATED.3IONS-SCNC: 9 MMOL/L (ref 4–13)
AST SERPL W P-5'-P-CCNC: 16 U/L (ref 5–45)
BASOPHILS # BLD AUTO: 0.03 THOUSANDS/ΜL (ref 0–0.1)
BASOPHILS NFR BLD AUTO: 1 % (ref 0–1)
BILIRUB SERPL-MCNC: 0.2 MG/DL (ref 0.2–1)
BUN SERPL-MCNC: 11 MG/DL (ref 5–25)
CALCIUM SERPL-MCNC: 9.1 MG/DL (ref 8.3–10.1)
CHLORIDE SERPL-SCNC: 104 MMOL/L (ref 100–108)
CO2 SERPL-SCNC: 26 MMOL/L (ref 21–32)
CREAT SERPL-MCNC: 0.81 MG/DL (ref 0.6–1.3)
EOSINOPHIL # BLD AUTO: 0.05 THOUSAND/ΜL (ref 0–0.61)
EOSINOPHIL NFR BLD AUTO: 1 % (ref 0–6)
ERYTHROCYTE [DISTWIDTH] IN BLOOD BY AUTOMATED COUNT: 16.3 % (ref 11.6–15.1)
GFR SERPL CREATININE-BSD FRML MDRD: 78 ML/MIN/1.73SQ M
GLUCOSE SERPL-MCNC: 85 MG/DL (ref 65–140)
HCT VFR BLD AUTO: 32.3 % (ref 34.8–46.1)
HGB BLD-MCNC: 9.8 G/DL (ref 11.5–15.4)
IMM GRANULOCYTES # BLD AUTO: 0.06 THOUSAND/UL (ref 0–0.2)
IMM GRANULOCYTES NFR BLD AUTO: 1 % (ref 0–2)
LYMPHOCYTES # BLD AUTO: 1.96 THOUSANDS/ΜL (ref 0.6–4.47)
LYMPHOCYTES NFR BLD AUTO: 30 % (ref 14–44)
MAGNESIUM SERPL-MCNC: 1.5 MG/DL (ref 1.6–2.6)
MCH RBC QN AUTO: 29.6 PG (ref 26.8–34.3)
MCHC RBC AUTO-ENTMCNC: 30.3 G/DL (ref 31.4–37.4)
MCV RBC AUTO: 98 FL (ref 82–98)
MONOCYTES # BLD AUTO: 0.65 THOUSAND/ΜL (ref 0.17–1.22)
MONOCYTES NFR BLD AUTO: 10 % (ref 4–12)
NEUTROPHILS # BLD AUTO: 3.69 THOUSANDS/ΜL (ref 1.85–7.62)
NEUTS SEG NFR BLD AUTO: 57 % (ref 43–75)
NRBC BLD AUTO-RTO: 0 /100 WBCS
PLATELET # BLD AUTO: 293 THOUSANDS/UL (ref 149–390)
PMV BLD AUTO: 9.9 FL (ref 8.9–12.7)
POTASSIUM SERPL-SCNC: 4 MMOL/L (ref 3.5–5.3)
PROT SERPL-MCNC: 7.4 G/DL (ref 6.4–8.2)
RBC # BLD AUTO: 3.31 MILLION/UL (ref 3.81–5.12)
SODIUM SERPL-SCNC: 139 MMOL/L (ref 136–145)
WBC # BLD AUTO: 6.44 THOUSAND/UL (ref 4.31–10.16)

## 2019-11-07 PROCEDURE — 99214 OFFICE O/P EST MOD 30 MIN: CPT | Performed by: PHYSICIAN ASSISTANT

## 2019-11-07 PROCEDURE — 80053 COMPREHEN METABOLIC PANEL: CPT

## 2019-11-07 PROCEDURE — 85025 COMPLETE CBC W/AUTO DIFF WBC: CPT

## 2019-11-07 PROCEDURE — 83735 ASSAY OF MAGNESIUM: CPT

## 2019-11-07 RX ORDER — SODIUM CHLORIDE 9 MG/ML
20 INJECTION, SOLUTION INTRAVENOUS ONCE
Status: CANCELLED | OUTPATIENT
Start: 2019-11-11

## 2019-11-07 RX ORDER — PALONOSETRON 0.05 MG/ML
0.25 INJECTION, SOLUTION INTRAVENOUS ONCE
Status: CANCELLED | OUTPATIENT
Start: 2019-11-11

## 2019-11-07 NOTE — PROGRESS NOTES
Patient to [facility] for Lab testing / PICC : Complaining of lightheadedness: VSS: Eating / drinking normally: Discharged from [facility] yesterday: Right UA Dual Lumen PICC line: Good blood return noted: Labs drawn per MD order

## 2019-11-07 NOTE — PROGRESS NOTES
[facility] - Outpatient Follow-up Note  Karen Melendez 60 y o  female MRN: [id number]  Encounter: [id number]          ASSESSMENT AND PLAN:      1  Dysphagia, unspecified type  She underwent EGD in September due to resistance during TRINH  EGD showed no evidence of esophageal obstruction and normal gastric bypass anatomy  She had essentially normal evaluation by speech pathology  She now reports resolution of dysphagia  If her dysphagia symptoms would return in the future, would recommend esophageal manometry testing to assess for dysmotility  Otherwise, she may continue regular diet as tolerated  2  Chemotherapy-induced nausea  Continue benzodiazepines as needed  She has follow-up scheduled with the chemotherapy nutritionist next week  3  Change in bowel habits  Likely secondary to chemotherapy  Recommend probiotic (with prebiotic daily)  She can start Align daily for bowel regularity  Follow-up as needed  ______________________________________________________________________    SUBJECTIVE:  59-year-old female with endometrial cancer on chemotherapy, DVT, hypertension, and gastric bypass presenting for hospital follow-up  She was admitted in September with MSSA bacteremia  TRINH was attempted however the probe could not be passed beyond the distal oropharynx  Subsequent EGD showed no evidence of esophageal obstruction and normal gastric bypass anatomy  During her hospitalization, she complained of cough and gagging with swallowing  She had essentially normal evaluation by speech pathology  She denies any dysphagia, odynophagia, heartburn  Her coughing has significantly improved  She states she has always had a strong gag reflex  She has nausea which she attributes to chemotherapy  This is controlled with benzodiazepines  She does get full quickly with eating which she attributes to gastric bypass  She denies abdominal pain    She has alternating diarrhea and constipation mainly associated with chemotherapy  She states she was taking a probiotic at the rehab facility, and would like to restart one  REVIEW OF SYSTEMS IS OTHERWISE NEGATIVE        Historical Information   Past Medical History:   Diagnosis Date    Anemia     Chemotherapy follow-up examination     Depression     Diabetes mellitus ([facility] [address] )     Endometrial cancer ([facility] [address] ) 12/2017    Hyperglycemia     vx type 2 dm -- last assessed 4/1/14; resolved 11/7/17    Hyperlipidemia     Hypertension     Obesity     last assessed 10/14/17; resolved 11/7/17     Past Surgical History:   Procedure Laterality Date    ABDOMINAL SURGERY      GASTRIC BYPASS    CHOLECYSTECTOMY      at the time of gastric bypass    COLONOSCOPY      CT NEEDLE BIOPSY LYMPH NODE  7/8/2019    GASTRIC BYPASS      HYSTERECTOMY Bilateral     total abdominal with salpingo-oophorectomy    IR PICC LINE  9/27/2019    IR PORT PLACEMENT  7/26/2019    IR PORT REMOVAL  9/20/2019    IR TUBE PLACEMENT NEPHROSTOMY  7/26/2019    OOPHORECTOMY Bilateral     OK LAP, RADICAL HYST W/ TUBE&OV, NODE BX N/A 12/19/2017    Procedure: HYSTERECTOMY LAPAROSCOPIC TOTAL ([address]) W/ ROBOTICS; BILATERAL SALPINGOOPHERECTOMY; LYMPH NODE DISSECTION; lysis of adhesions;  Surgeon: Araceli Simpson MD;  [facility];  Service: Gynecology Oncology    OK LAP,DIAGNOSTIC ABDOMEN N/A 12/19/2017    Procedure: LAPAROSCOPY DIAGNOSTIC;  Surgeon: Araceli Simpson MD;  [facility];  Service: Gynecology Oncology    TONSILLECTOMY      US GUIDED BREAST BIOPSY RIGHT COMPLETE Right 6/28/2019     Social History   Social History     Substance and Sexual Activity   Alcohol Use Not Currently     Social History     Substance and Sexual Activity   Drug Use No     Social History     Tobacco Use   Smoking Status Never Smoker   Smokeless Tobacco Never Used     Family History   Problem Relation Age of Onset    Other Mother         dyslipidemia    Ovarian cancer Mother 48    Lymphoma Father     Bone cancer Maternal Grandfather     No Known Problems Sister     No Known Problems Maternal Grandmother     No Known Problems Paternal Grandmother     No Known Problems Paternal Grandfather     No Known Problems Maternal Aunt     No Known Problems Maternal Aunt     No Known Problems Maternal Aunt     No Known Problems Maternal Aunt     No Known Problems Paternal Aunt     No Known Problems Paternal Aunt     No Known Problems Paternal Aunt     No Known Problems Paternal Aunt     No Known Problems Brother     No Known Problems Brother        Meds/Allergies       Current Outpatient Medications:     ARIPiprazole (ABILIFY) 10 mg tablet    aspirin (ECOTRIN LOW STRENGTH) 81 mg EC tablet    cholecalciferol (VITAMIN D3) 1,000 units tablet    folic acid (FOLVITE) 1 mg tablet    gemfibrozil (LOPID) 600 mg tablet    lisinopril (ZESTRIL) 5 mg tablet    MYRBETRIQ 50 MG TB24    omeprazole (PriLOSEC) 20 mg delayed release capsule    venlafaxine (EFFEXOR) 75 mg tablet    acetaminophen (TYLENOL) 325 mg tablet    benzonatate (TESSALON) 200 MG capsule    CRANIAL PROSTHESIS, RX,    enoxaparin (LOVENOX) 100 mg/mL    LORazepam (ATIVAN) 1 mg tablet    ondansetron (ZOFRAN) 8 mg tablet    saccharomyces boulardii (FLORASTOR) 250 mg capsule    Allergies   Allergen Reactions    Cephalosporins Rash     Which Cephalosporin reaction was to not specified; however, has tolerated Amoxicillin, Cefazolin, and Cefepime           Objective     Blood pressure 129/77, pulse 84, temperature (!) 96 1 °F (35 6 °C), temperature source Tympanic, height 4' 11" (1 499 m), weight 86 7 kg (191 lb 3 2 oz), not currently breastfeeding  Body mass index is 38 62 kg/m²        PHYSICAL EXAM:      General Appearance:   Alert, pleasant female, cooperative, no distress   HEENT:   Normocephalic, atraumatic, anicteric      Neck:  Supple, symmetrical, trachea midline   Lungs:   Clear to auscultation bilaterally   Heart[de-identified]   Regular rate and rhythm   Abdomen:   Soft, non-tender, non-distended; normal bowel sounds; no masses, no organomegaly    Genitalia:   Deferred    Rectal:   Deferred    Extremities:  No cyanosis, clubbing or edema    Pulses:  2+ and symmetric    Skin:  No jaundice, rashes, or lesions    Lymph nodes:  No palpable cervical lymphadenopathy        Lab Results:   No visits with results within 1 Day(s) from this visit  Latest known visit with results is:   Hospital Outpatient Visit on 10/31/2019   Component Date Value    WBC 10/31/2019 4 89     RBC 10/31/2019 3 28*    Hemoglobin 10/31/2019 9 7*    Hematocrit 10/31/2019 31 6*    MCV 10/31/2019 96     MCH 10/31/2019 29 6     MCHC 10/31/2019 30 7*    RDW 10/31/2019 17 1*    MPV 10/31/2019 10 6     Platelets 98/37/2302 257     nRBC 10/31/2019 0     Neutrophils Relative 10/31/2019 57     Immat GRANS % 10/31/2019 1     Lymphocytes Relative 10/31/2019 31     Monocytes Relative 10/31/2019 8     Eosinophils Relative 10/31/2019 3     Basophils Relative 10/31/2019 0     Neutrophils Absolute 10/31/2019 2 82     Immature Grans Absolute 10/31/2019 0 03     Lymphocytes Absolute 10/31/2019 1 50     Monocytes Absolute 10/31/2019 0 38     Eosinophils Absolute 10/31/2019 0 14     Basophils Absolute 10/31/2019 0 02     Sodium 10/31/2019 142     Potassium 10/31/2019 3 5     Chloride 10/31/2019 105     CO2 10/31/2019 26     ANION GAP 10/31/2019 11     BUN 10/31/2019 8     Creatinine 10/31/2019 0 55*    Glucose 10/31/2019 92     Calcium 10/31/2019 9 3     AST 10/31/2019 16     ALT 10/31/2019 11*    Alkaline Phosphatase 10/31/2019 98     Total Protein 10/31/2019 7 4     Albumin 10/31/2019 2 8*    Total Bilirubin 10/31/2019 0 20     eGFR 10/31/2019 101     Magnesium 10/31/2019 1 6          Radiology Results:   Xr Chest 1 View Portable    Result Date: 10/22/2019  Narrative: CHEST INDICATION:   flank pain, hematuria   COMPARISON:  September 30, 2019 EXAM PERFORMED/VIEWS:  XR CHEST PORTABLE FINDINGS:  Right-sided PICC line is present, tip overlying superior vena cava  Heart shadow is enlarged but unchanged from prior exam  There is a small left costophrenic angle effusion with overlying airspace opacity  Otherwise normal aeration of visualized lung fields  Osseous structures appear within normal limits for patient age  Impression: Small left costophrenic angle effusion with overlying left basilar atelectasis  Marked cardiomegaly  Workstation performed: [id number]     Ct Renal Protocol    Result Date: 10/22/2019  Narrative: CT ABDOMEN AND PELVIS WITH AND WITHOUT IV CONTRAST INDICATION:   hematuria, R flank pain, nephrostomy tube  COMPARISON:  CT abdomen/pelvis 10/6/2019  TECHNIQUE: Initial CT of the abdomen and pelvis was performed without intravenous contrast   Subsequent dynamic CT evaluation of the abdomen and pelvis was performed after the administration of intravenous contrast in both nephrographic and delayed phases after the administration of intravenous contrast   Axial, sagittal, and coronal 2D reformatted images were created from the source data and submitted for interpretation  Radiation dose length product (DLP) for this visit:  2264 mGy-cm   This examination, like all CT scans performed in the [facility], was performed utilizing techniques to minimize radiation dose exposure, including the use of iterative reconstruction and automated exposure control  IV Contrast:  100 mL of iohexol (OMNIPAQUE) Enteric Contrast:  Enteric contrast was not administered  FINDINGS: ABDOMEN RIGHT KIDNEY AND URETER: No solid renal mass  No detectable urothelial mass  Exophytic simple renal cyst superior pole of the right kidney  No hydronephrosis or hydroureter  No urinary tract calculi  No perinephric collection  LEFT KIDNEY AND URETER: No solid renal mass  No detectable urothelial mass  No hydronephrosis or hydroureter  No urinary tract calculi  No perinephric collection  URINARY BLADDER: No bladder wall mass  No calculi  LOWER CHEST:  Small bilateral pleural effusions  LIVER/BILIARY TREE:  Unremarkable  GALLBLADDER:  No calcified gallstones  No pericholecystic inflammatory change  SPLEEN:  Unremarkable  PANCREAS:  Unremarkable  ADRENAL GLANDS:  Unremarkable  STOMACH AND BOWEL:  Status post gastric bypass ABDOMINOPELVIC CAVITY:  No ascites  No free intraperitoneal air  Stable right external iliac chain adenopathy measuring up to 1 3 cm  Stable sized right pelvic mass with persistent encasement/abutment of the right iliac vessels and probable occlusion of the right external iliac vein    VESSELS:  Unremarkable for patient's age  PELVIS REPRODUCTIVE ORGANS:  Patient is status post hysterectomy  APPENDIX: No findings to suggest appendicitis  ABDOMINAL WALL/INGUINAL REGIONS:  Unremarkable  OSSEOUS STRUCTURES:  No acute fracture or destructive osseous lesion  Impression: Etiology for patient's hematuria is not identified on this examination Stable position of right percutaneous nephroureteral catheter without evidence of hydronephrosis  Stable right external iliac chain adenopathy and stable size of right pelvic mass abutting the right iliac vessels    Small bilateral pleural effusions Workstation performed: [id number]

## 2019-11-07 NOTE — LETTER
November 7, 2019     Floridalma Keyes DO  [address]    Patient: Ryan Elise   YOB: 1955   Date of Visit: 11/7/2019       Dear Dr Gene Nicole:    Thank you for referring Ryan Elise to me for evaluation  Below are my notes for this consultation  If you have questions, please do not hesitate to call me  I look forward to following your patient along with you  Sincerely,        Constance Esparza PA-C        CC: No Recipients  Constance Esparza PA-C  11/7/2019  9:28 AM  Sign at close encounter  [facility] - Outpatient Follow-up Note  Wendy Melendez 60 y o  female MRN: [id number]  Encounter: [id number]          ASSESSMENT AND PLAN:      1  Dysphagia, unspecified type  She underwent EGD in September due to resistance during TRINH  EGD showed no evidence of esophageal obstruction and normal gastric bypass anatomy  She had essentially normal evaluation by speech pathology  She now reports resolution of dysphagia  If her dysphagia symptoms would return in the future, would recommend esophageal manometry testing to assess for dysmotility  Otherwise, she may continue regular diet as tolerated  2  Chemotherapy-induced nausea  Continue benzodiazepines as needed  She has follow-up scheduled with the chemotherapy nutritionist next week  Follow-up as needed  ______________________________________________________________________    SUBJECTIVE:  82-year-old female with endometrial cancer on chemotherapy, DVT, hypertension, and gastric bypass presenting for hospital follow-up  She was admitted in September with MSSA bacteremia  TRINH was attempted however the probe could not be passed beyond the distal oropharynx  Subsequent EGD showed no evidence of esophageal obstruction and normal gastric bypass anatomy  During her hospitalization, she complained of cough and gagging with swallowing    She had essentially normal evaluation by speech pathology  She denies any dysphagia, odynophagia, heartburn  Her coughing has significantly improved  She states she has always had a strong gag reflex  She has nausea which she attributes to chemotherapy  This is controlled with benzodiazepines  She does get full quickly with eating which she attributes to gastric bypass  She denies abdominal pain  She has alternating diarrhea and constipation mainly associated with chemotherapy  She states she was taking a probiotic at the rehab facility, and would like to restart one  REVIEW OF SYSTEMS IS OTHERWISE NEGATIVE        Historical Information   Past Medical History:   Diagnosis Date    Anemia     Chemotherapy follow-up examination     Depression     Diabetes mellitus ([facility] [address] )     Endometrial cancer ([facility] [address] ) 12/2017    Hyperglycemia     vx type 2 dm -- last assessed 4/1/14; resolved 11/7/17    Hyperlipidemia     Hypertension     Obesity     last assessed 10/14/17; resolved 11/7/17     Past Surgical History:   Procedure Laterality Date    ABDOMINAL SURGERY      GASTRIC BYPASS    CHOLECYSTECTOMY      at the time of gastric bypass    COLONOSCOPY      CT NEEDLE BIOPSY LYMPH NODE  7/8/2019    GASTRIC BYPASS      HYSTERECTOMY Bilateral     total abdominal with salpingo-oophorectomy    IR PICC LINE  9/27/2019    IR PORT PLACEMENT  7/26/2019    IR PORT REMOVAL  9/20/2019    IR TUBE PLACEMENT NEPHROSTOMY  7/26/2019    OOPHORECTOMY Bilateral     LA LAP, RADICAL HYST W/ TUBE&OV, NODE BX N/A 12/19/2017    Procedure: HYSTERECTOMY LAPAROSCOPIC TOTAL ([address]) W/ ROBOTICS; BILATERAL SALPINGOOPHERECTOMY; LYMPH NODE DISSECTION; lysis of adhesions;  Surgeon: Rob Dailey MD;  [facility];  Service: Gynecology Oncology    LA LAP,DIAGNOSTIC ABDOMEN N/A 12/19/2017    Procedure: LAPAROSCOPY DIAGNOSTIC;  Surgeon: Rob Dailey MD;  [facility];  Service: Gynecology Oncology    TONSILLECTOMY      US GUIDED BREAST BIOPSY RIGHT COMPLETE Right 6/28/2019     Social History   Social History     Substance and Sexual Activity   Alcohol Use Not Currently     Social History     Substance and Sexual Activity   Drug Use No     Social History     Tobacco Use   Smoking Status Never Smoker   Smokeless Tobacco Never Used     Family History   Problem Relation Age of Onset    Other Mother         dyslipidemia    Ovarian cancer Mother 48    Lymphoma Father     Bone cancer Maternal Grandfather     No Known Problems Sister     No Known Problems Maternal Grandmother     No Known Problems Paternal Grandmother     No Known Problems Paternal Grandfather     No Known Problems Maternal Aunt     No Known Problems Maternal Aunt     No Known Problems Maternal Aunt     No Known Problems Maternal Aunt     No Known Problems Paternal Aunt     No Known Problems Paternal Aunt     No Known Problems Paternal Aunt     No Known Problems Paternal Aunt     No Known Problems Brother     No Known Problems Brother        Meds/Allergies       Current Outpatient Medications:     ARIPiprazole (ABILIFY) 10 mg tablet    aspirin (ECOTRIN LOW STRENGTH) 81 mg EC tablet    cholecalciferol (VITAMIN D3) 1,000 units tablet    folic acid (FOLVITE) 1 mg tablet    gemfibrozil (LOPID) 600 mg tablet    lisinopril (ZESTRIL) 5 mg tablet    MYRBETRIQ 50 MG TB24    omeprazole (PriLOSEC) 20 mg delayed release capsule    venlafaxine (EFFEXOR) 75 mg tablet    acetaminophen (TYLENOL) 325 mg tablet    benzonatate (TESSALON) 200 MG capsule    CRANIAL PROSTHESIS, RX,    enoxaparin (LOVENOX) 100 mg/mL    LORazepam (ATIVAN) 1 mg tablet    ondansetron (ZOFRAN) 8 mg tablet    saccharomyces boulardii (FLORASTOR) 250 mg capsule    Allergies   Allergen Reactions    Cephalosporins Rash     Which Cephalosporin reaction was to not specified; however, has tolerated Amoxicillin, Cefazolin, and Cefepime           Objective     Blood pressure 129/77, pulse 84, temperature (!) 96 1 °F (35 6 °C), temperature source Tympanic, height 4' 11" (1 499 m), weight 86 7 kg (191 lb 3 2 oz), not currently breastfeeding  Body mass index is 38 62 kg/m²  PHYSICAL EXAM:      General Appearance:   Alert, pleasant female, cooperative, no distress   HEENT:   Normocephalic, atraumatic, anicteric      Neck:  Supple, symmetrical, trachea midline   Lungs:   Clear to auscultation bilaterally   Heart[de-identified]   Regular rate and rhythm   Abdomen:   Soft, non-tender, non-distended; normal bowel sounds; no masses, no organomegaly    Genitalia:   Deferred    Rectal:   Deferred    Extremities:  No cyanosis, clubbing or edema    Pulses:  2+ and symmetric    Skin:  No jaundice, rashes, or lesions    Lymph nodes:  No palpable cervical lymphadenopathy        Lab Results:   No visits with results within 1 Day(s) from this visit     Latest known visit with results is:   Hospital Outpatient Visit on 10/31/2019   Component Date Value    WBC 10/31/2019 4 89     RBC 10/31/2019 3 28*    Hemoglobin 10/31/2019 9 7*    Hematocrit 10/31/2019 31 6*    MCV 10/31/2019 96     MCH 10/31/2019 29 6     MCHC 10/31/2019 30 7*    RDW 10/31/2019 17 1*    MPV 10/31/2019 10 6     Platelets 02/64/3606 257     nRBC 10/31/2019 0     Neutrophils Relative 10/31/2019 57     Immat GRANS % 10/31/2019 1     Lymphocytes Relative 10/31/2019 31     Monocytes Relative 10/31/2019 8     Eosinophils Relative 10/31/2019 3     Basophils Relative 10/31/2019 0     Neutrophils Absolute 10/31/2019 2 82     Immature Grans Absolute 10/31/2019 0 03     Lymphocytes Absolute 10/31/2019 1 50     Monocytes Absolute 10/31/2019 0 38     Eosinophils Absolute 10/31/2019 0 14     Basophils Absolute 10/31/2019 0 02     Sodium 10/31/2019 142     Potassium 10/31/2019 3 5     Chloride 10/31/2019 105     CO2 10/31/2019 26     ANION GAP 10/31/2019 11     BUN 10/31/2019 8     Creatinine 10/31/2019 0 55*    Glucose 10/31/2019 92     Calcium 10/31/2019 9 3     AST 10/31/2019 16     ALT 10/31/2019 11*    Alkaline Phosphatase 10/31/2019 98     Total Protein 10/31/2019 7 4     Albumin 10/31/2019 2 8*    Total Bilirubin 10/31/2019 0 20     eGFR 10/31/2019 101     Magnesium 10/31/2019 1 6          Radiology Results:   Xr Chest 1 View Portable    Result Date: 10/22/2019  Narrative: CHEST INDICATION:   flank pain, hematuria  COMPARISON:  September 30, 2019 EXAM PERFORMED/VIEWS:  XR CHEST PORTABLE FINDINGS:  Right-sided PICC line is present, tip overlying superior vena cava  Heart shadow is enlarged but unchanged from prior exam  There is a small left costophrenic angle effusion with overlying airspace opacity  Otherwise normal aeration of visualized lung fields  Osseous structures appear within normal limits for patient age  Impression: Small left costophrenic angle effusion with overlying left basilar atelectasis  Marked cardiomegaly  Workstation performed: [id number]     Ct Renal Protocol    Result Date: 10/22/2019  Narrative: CT ABDOMEN AND PELVIS WITH AND WITHOUT IV CONTRAST INDICATION:   hematuria, R flank pain, nephrostomy tube  COMPARISON:  CT abdomen/pelvis 10/6/2019  TECHNIQUE: Initial CT of the abdomen and pelvis was performed without intravenous contrast   Subsequent dynamic CT evaluation of the abdomen and pelvis was performed after the administration of intravenous contrast in both nephrographic and delayed phases after the administration of intravenous contrast   Axial, sagittal, and coronal 2D reformatted images were created from the source data and submitted for interpretation  Radiation dose length product (DLP) for this visit:  2264 mGy-cm   This examination, like all CT scans performed in the [facility], was performed utilizing techniques to minimize radiation dose exposure, including the use of iterative reconstruction and automated exposure control   IV Contrast:  100 mL of iohexol (OMNIPAQUE) Enteric Contrast:  Enteric contrast was not administered  FINDINGS: ABDOMEN RIGHT KIDNEY AND URETER: No solid renal mass  No detectable urothelial mass  Exophytic simple renal cyst superior pole of the right kidney  No hydronephrosis or hydroureter  No urinary tract calculi  No perinephric collection  LEFT KIDNEY AND URETER: No solid renal mass  No detectable urothelial mass  No hydronephrosis or hydroureter  No urinary tract calculi  No perinephric collection  URINARY BLADDER: No bladder wall mass  No calculi  LOWER CHEST:  Small bilateral pleural effusions  LIVER/BILIARY TREE:  Unremarkable  GALLBLADDER:  No calcified gallstones  No pericholecystic inflammatory change  SPLEEN:  Unremarkable  PANCREAS:  Unremarkable  ADRENAL GLANDS:  Unremarkable  STOMACH AND BOWEL:  Status post gastric bypass ABDOMINOPELVIC CAVITY:  No ascites  No free intraperitoneal air  Stable right external iliac chain adenopathy measuring up to 1 3 cm  Stable sized right pelvic mass with persistent encasement/abutment of the right iliac vessels and probable occlusion of the right external iliac vein    VESSELS:  Unremarkable for patient's age  PELVIS REPRODUCTIVE ORGANS:  Patient is status post hysterectomy  APPENDIX: No findings to suggest appendicitis  ABDOMINAL WALL/INGUINAL REGIONS:  Unremarkable  OSSEOUS STRUCTURES:  No acute fracture or destructive osseous lesion  Impression: Etiology for patient's hematuria is not identified on this examination Stable position of right percutaneous nephroureteral catheter without evidence of hydronephrosis  Stable right external iliac chain adenopathy and stable size of right pelvic mass abutting the right iliac vessels    Small bilateral pleural effusions Workstation performed: [id number]

## 2019-11-11 ENCOUNTER — NUTRITION (OUTPATIENT)
Dept: NUTRITION | Facility: CLINIC | Age: 64
End: 2019-11-11

## 2019-11-11 ENCOUNTER — HOSPITAL ENCOUNTER (OUTPATIENT)
Dept: INFUSION CENTER | Facility: CLINIC | Age: 64
Discharge: HOME | End: 2019-11-11
Payer: COMMERCIAL

## 2019-11-11 ENCOUNTER — ANESTHESIA EVENT (OUTPATIENT)
Dept: PERIOP | Facility: AMBULARY SURGERY CENTER | Age: 64
End: 2019-11-11
Payer: COMMERCIAL

## 2019-11-11 VITALS
HEIGHT: 59 IN | TEMPERATURE: 97.6 F | SYSTOLIC BLOOD PRESSURE: 112 MMHG | HEART RATE: 81 BPM | RESPIRATION RATE: 18 BRPM | OXYGEN SATURATION: 98 % | BODY MASS INDEX: 38.3 KG/M2 | DIASTOLIC BLOOD PRESSURE: 68 MMHG | WEIGHT: 190 LBS

## 2019-11-11 DIAGNOSIS — Z71.3 NUTRITIONAL COUNSELING: Primary | ICD-10-CM

## 2019-11-11 DIAGNOSIS — C54.1 ENDOMETRIAL CANCER (HCC): Primary | ICD-10-CM

## 2019-11-11 PROCEDURE — 96413 CHEMO IV INFUSION 1 HR: CPT

## 2019-11-11 PROCEDURE — 96375 TX/PRO/DX INJ NEW DRUG ADDON: CPT

## 2019-11-11 PROCEDURE — 96417 CHEMO IV INFUS EACH ADDL SEQ: CPT

## 2019-11-11 PROCEDURE — 96367 TX/PROPH/DG ADDL SEQ IV INF: CPT

## 2019-11-11 PROCEDURE — 96415 CHEMO IV INFUSION ADDL HR: CPT

## 2019-11-11 PROCEDURE — 96377 APPLICATON ON-BODY INJECTOR: CPT

## 2019-11-11 RX ORDER — PALONOSETRON 0.05 MG/ML
0.25 INJECTION, SOLUTION INTRAVENOUS ONCE
Status: COMPLETED | OUTPATIENT
Start: 2019-11-11 | End: 2019-11-11

## 2019-11-11 RX ORDER — SODIUM CHLORIDE 9 MG/ML
20 INJECTION, SOLUTION INTRAVENOUS ONCE
Status: COMPLETED | OUTPATIENT
Start: 2019-11-11 | End: 2019-11-11

## 2019-11-11 RX ADMIN — PALONOSETRON 0.25 MG: 0.05 INJECTION, SOLUTION INTRAVENOUS at 08:50

## 2019-11-11 RX ADMIN — PEGFILGRASTIM 6 MG: KIT SUBCUTANEOUS at 14:35

## 2019-11-11 RX ADMIN — DEXAMETHASONE SODIUM PHOSPHATE 20 MG: 10 INJECTION, SOLUTION INTRAMUSCULAR; INTRAVENOUS at 08:25

## 2019-11-11 RX ADMIN — CARBOPLATIN 506 MG: 10 INJECTION, SOLUTION INTRAVENOUS at 13:25

## 2019-11-11 RX ADMIN — FAMOTIDINE 20 MG: 10 INJECTION INTRAVENOUS at 09:15

## 2019-11-11 RX ADMIN — SODIUM CHLORIDE 20 ML/HR: 0.9 INJECTION, SOLUTION INTRAVENOUS at 07:45

## 2019-11-11 RX ADMIN — SODIUM CHLORIDE 150 MG: 0.9 INJECTION, SOLUTION INTRAVENOUS at 09:40

## 2019-11-11 RX ADMIN — PACLITAXEL 252.6 MG: 6 INJECTION, SOLUTION INTRAVENOUS at 10:15

## 2019-11-11 RX ADMIN — DIPHENHYDRAMINE HYDROCHLORIDE 25 MG: 50 INJECTION, SOLUTION INTRAMUSCULAR; INTRAVENOUS at 08:50

## 2019-11-11 NOTE — PATIENT INSTRUCTIONS
Nutrition Rx & Recommendations:  · Diet: High Calorie, High Protein (for high calorie foods see pages 52-53, and for high protein foods see pages 49-51 in your Eating Hints book)  · Nutrition Supplements: - [place] Instant Breakfast or homemade shakes/smoothies 1-2x/day  May use homemade shakes/smoothies as desired  If using a pre-made shake/bar, choose ones with >300 calories and >10 grams protein (ex  Ensure Enlive, Ensure Plus, Boost Plus, Boost Very High Calorie, etc )  · Small, frequent meals/snacks may be easier to tolerate than 3 large daily meals  Aim for 5-6 small meals per day (every 2-3 hours)  · Include protein at all meals/snacks  · Stay hydrated by sipping fluids of choice/tolerance throughout the day  · Refer to Eating Hints book for other meal/snack ideas and symptom management  · Weigh yourself regularly  If you notice weight loss, make an effort to increase your daily food/calorie intake  If you continue to notice loss after these efforts, reach out to your dietitian to establish a plan to stabilize weight  · High calorie foods to try: - add butter and cheese to potatoes and vegetables; add cheese/hard boiled eggs/extra dressing to salads; eat 6 small meals each day, do not skip meals  (see pages 52-53 in your Eating Hints book)  · High protein foods to try: [place] yogurt with fruit, trail mix, oral nutrition supplements, eggs, meats, poultry, fish, cheese sticks, pudding, peanut butter, nuts, rolled up chicken/turkey/ham, hummus (see pages 49-51 in your Eating Hints book)   · Do not skip meals  Eat 6 times per day  · Weigh yourself on Monday, Wednesday, and Friday mornings    · Lifetime supplements recommended for Gastric Bypass:  · Multivitamin BID  · Iron daily  · Vitamin D daily  · Calcium Citrate 500 mg TID (do not take with iron, needs to be 2 hrs apart)  · B12 1000 mcg daily    Follow Up Plan: 11/27/19 at 10am prior to bloodwork  Recommend Referral to Other Providers: none at this time

## 2019-11-11 NOTE — PROGRESS NOTES
Patient to [facility] for Taxol / Carboplatin: Offers no complaints at present time: Lab work ( 11/08/19 ) reviewed:  Within parameters to treat: Right UA Dual Lumen PICC line: Good blood return noted

## 2019-11-11 NOTE — PROGRESS NOTES
Tolerated infusion without incident: No adverse reactions noted: Neulasta On Pro per MD order: Applied to Right UA: Verified follow up appt with patient: Declined AVS

## 2019-11-12 ENCOUNTER — ANESTHESIA (OUTPATIENT)
Dept: PERIOP | Facility: AMBULARY SURGERY CENTER | Age: 64
End: 2019-11-12
Payer: COMMERCIAL

## 2019-11-12 ENCOUNTER — HOSPITAL ENCOUNTER (OUTPATIENT)
Facility: AMBULARY SURGERY CENTER | Age: 64
Setting detail: OUTPATIENT SURGERY
Discharge: HOME | End: 2019-11-12
Attending: RADIOLOGY | Admitting: RADIOLOGY
Payer: COMMERCIAL

## 2019-11-12 ENCOUNTER — APPOINTMENT (OUTPATIENT)
Dept: RADIOLOGY | Facility: AMBULARY SURGERY CENTER | Age: 64
End: 2019-11-12
Payer: COMMERCIAL

## 2019-11-12 VITALS
DIASTOLIC BLOOD PRESSURE: 53 MMHG | TEMPERATURE: 97.8 F | SYSTOLIC BLOOD PRESSURE: 136 MMHG | RESPIRATION RATE: 18 BRPM | HEART RATE: 74 BPM | OXYGEN SATURATION: 100 %

## 2019-11-12 DIAGNOSIS — C54.1 ENDOMETRIAL CANCER (HCC): Primary | Chronic | ICD-10-CM

## 2019-11-12 PROCEDURE — 76937 US GUIDE VASCULAR ACCESS: CPT | Performed by: RADIOLOGY

## 2019-11-12 PROCEDURE — 77001 FLUOROGUIDE FOR VEIN DEVICE: CPT | Performed by: RADIOLOGY

## 2019-11-12 PROCEDURE — 36561 INSERT TUNNELED CV CATH: CPT | Performed by: RADIOLOGY

## 2019-11-12 PROCEDURE — 77001 FLUOROGUIDE FOR VEIN DEVICE: CPT

## 2019-11-12 PROCEDURE — C1788 PORT, INDWELLING, IMP: HCPCS | Performed by: RADIOLOGY

## 2019-11-12 DEVICE — PORT HEMODIAL INTERMED PROFILE 8FR KIT: Type: IMPLANTABLE DEVICE | Site: CHEST | Status: FUNCTIONAL

## 2019-11-12 RX ORDER — MIDAZOLAM HYDROCHLORIDE 2 MG/2ML
INJECTION, SOLUTION INTRAMUSCULAR; INTRAVENOUS AS NEEDED
Status: DISCONTINUED | OUTPATIENT
Start: 2019-11-12 | End: 2019-11-12 | Stop reason: SURG

## 2019-11-12 RX ORDER — ONDANSETRON 2 MG/ML
4 INJECTION INTRAMUSCULAR; INTRAVENOUS ONCE AS NEEDED
Status: CANCELLED | OUTPATIENT
Start: 2019-11-12

## 2019-11-12 RX ORDER — SODIUM CHLORIDE 9 MG/ML
INJECTION, SOLUTION INTRAVENOUS AS NEEDED
Status: DISCONTINUED | OUTPATIENT
Start: 2019-11-12 | End: 2019-11-12 | Stop reason: HOSPADM

## 2019-11-12 RX ORDER — CLINDAMYCIN PHOSPHATE 600 MG/50ML
600 INJECTION INTRAVENOUS ONCE
Status: COMPLETED | OUTPATIENT
Start: 2019-11-12 | End: 2019-11-12

## 2019-11-12 RX ORDER — SODIUM CHLORIDE, SODIUM LACTATE, POTASSIUM CHLORIDE, CALCIUM CHLORIDE 600; 310; 30; 20 MG/100ML; MG/100ML; MG/100ML; MG/100ML
20 INJECTION, SOLUTION INTRAVENOUS CONTINUOUS
Status: DISCONTINUED | OUTPATIENT
Start: 2019-11-12 | End: 2019-11-12 | Stop reason: HOSPADM

## 2019-11-12 RX ORDER — FENTANYL CITRATE 50 UG/ML
INJECTION, SOLUTION INTRAMUSCULAR; INTRAVENOUS AS NEEDED
Status: DISCONTINUED | OUTPATIENT
Start: 2019-11-12 | End: 2019-11-12 | Stop reason: SURG

## 2019-11-12 RX ORDER — SODIUM CHLORIDE, SODIUM LACTATE, POTASSIUM CHLORIDE, CALCIUM CHLORIDE 600; 310; 30; 20 MG/100ML; MG/100ML; MG/100ML; MG/100ML
INJECTION, SOLUTION INTRAVENOUS CONTINUOUS PRN
Status: DISCONTINUED | OUTPATIENT
Start: 2019-11-12 | End: 2019-11-12

## 2019-11-12 RX ORDER — LIDOCAINE HYDROCHLORIDE AND EPINEPHRINE 10; 10 MG/ML; UG/ML
INJECTION, SOLUTION INFILTRATION; PERINEURAL AS NEEDED
Status: DISCONTINUED | OUTPATIENT
Start: 2019-11-12 | End: 2019-11-12 | Stop reason: HOSPADM

## 2019-11-12 RX ORDER — FENTANYL CITRATE/PF 50 MCG/ML
25 SYRINGE (ML) INJECTION
Status: CANCELLED | OUTPATIENT
Start: 2019-11-12

## 2019-11-12 RX ORDER — SODIUM CHLORIDE, SODIUM LACTATE, POTASSIUM CHLORIDE, CALCIUM CHLORIDE 600; 310; 30; 20 MG/100ML; MG/100ML; MG/100ML; MG/100ML
125 INJECTION, SOLUTION INTRAVENOUS CONTINUOUS
Status: DISCONTINUED | OUTPATIENT
Start: 2019-11-12 | End: 2019-11-12 | Stop reason: HOSPADM

## 2019-11-12 RX ADMIN — FENTANYL CITRATE 25 MCG: 50 INJECTION, SOLUTION INTRAMUSCULAR; INTRAVENOUS at 11:54

## 2019-11-12 RX ADMIN — CLINDAMYCIN PHOSPHATE 600 MG: 12 INJECTION, SOLUTION INTRAMUSCULAR; INTRAVENOUS at 11:51

## 2019-11-12 RX ADMIN — FENTANYL CITRATE 25 MCG: 50 INJECTION, SOLUTION INTRAMUSCULAR; INTRAVENOUS at 12:04

## 2019-11-12 RX ADMIN — SODIUM CHLORIDE, SODIUM LACTATE, POTASSIUM CHLORIDE, AND CALCIUM CHLORIDE: .6; .31; .03; .02 INJECTION, SOLUTION INTRAVENOUS at 11:44

## 2019-11-12 RX ADMIN — MIDAZOLAM HYDROCHLORIDE 2 MG: 1 INJECTION, SOLUTION INTRAMUSCULAR; INTRAVENOUS at 11:44

## 2019-11-12 RX ADMIN — FENTANYL CITRATE 50 MCG: 50 INJECTION, SOLUTION INTRAMUSCULAR; INTRAVENOUS at 11:58

## 2019-11-12 NOTE — DISCHARGE INSTRUCTIONS
Implanted Venous Access Port     WHAT YOU NEED TO KNOW:   An implanted venous access port is a device used to give treatments and take blood  It may also be called a central venous access device (CVAD)  The port is a small container that is placed under your skin, usually in your upper chest  The port is attached to a catheter that enters a large vein  DISCHARGE INSTRUCTIONS:   Resume your normal diet  Small sips of flat soda will help with mild nausea  Prevent an infection:   · Wash your hands often  Use soap and water  Clean your hands before and after you care for your port  Remind everyone who cares for your port to wash their hands  · Check your skin for infection every day  Look for redness, swelling, or fluid oozing from the port site  Care for your port:   1  You may shower beginning 48 hours after procedure  2  Change dressing if it becomes wet  3  Remove dressing after 24 hours  Leave glue in place  4  It is normal for some bruising to occur  5  Use Tylenol for pain  6  Limit use of arm on the side that your port was placed  Lift nothing heavier than 5 pounds for 1 week, and then gradually increase activity as tolerated  7  DO NOT apply ointment, lotion or cream to port site until incision is healed  Allow glue to fall off  DO NOT attempt to peel glue from skin even it it begins to flake  8, After the port incision is healed you may swim, bathe  Notify the Interventional Radiologist if you have any of the followin  Fever above 101 F    2  Increased redness or swelling after 1st day  3  Increased pain after 1st day  4  Any sign of infection (drainage from port site, skin separation, hot to touch)  5  Persistent nausea or vomiting  Contact Interventional Radiology at [phone] [facility] PATIENTS: Contact Interventional Radiology at [phone]) ([address] [facility] [address]: Contact Interventional Radiology at [phone])

## 2019-11-12 NOTE — INTERVAL H&P NOTE
Patient arrived to [facility] for port placement    The procedure and risks were discussed with the patient  All questions were answered  Informed consent was obtained  H & P reviewed after examining the patient and I find no changes in the patient condition since the H & P has been written  BP (!) 111/49   Pulse 67   Temp 98 6 °F (37 °C) (Temporal)   Resp 20   LMP  (LMP Unknown)   SpO2 96%     Patient re-evaluated   Accept as history and physical     Chuckie De Paz, DO/November 12, 2019/11:20 AM

## 2019-11-12 NOTE — OP NOTE
Chest port placement 11/12/2019     History:      Endometrial carcinoma     Procedure:     The patient was identified verbally and by wristband  Timeout was performed  Informed consent was obtained       All elements of maximal sterile barrier technique were followed (cap, mask, sterile gown, sterile gloves, large sterile sheet, hand hygiene and 2% chlorhexidine for cutaneous antisepsis)      Following obtaining informed consent, the patient was prepped and draped in the usual sterile fashion  Using ultrasound guidance, access was gained to the patient's left internal jugular vein using a micropuncture system  The micropuncture wire was removed and a  035 wire was advanced to the level of the inferior vena cava using fluoroscopic guidance      Using 1% lidocaine, the region of the left anterior chest was was anesthetized  A small incision was made using a 15 blade scalpel  The port pocket was created using blunt dissection      The catheter tubing was tunneled from the incision to the venotomy  The micropuncture dilator was exchanged for a peel-away sheath, using fluoroscopic guidance  The catheter was place through the peel-away sheath  The catheter was measured and cut to size  The catheter was attached to the port  The port was flushed with saline to ensure patency without evidence of leakage  The port was placed in the port pocket  The port was sutured in the pocket using 3-0 Vicryl      The incisions were approximated with 3-0 Vicryl and Histoacryl  The patient tolerated the procedure well without apparent immediate complications  The patient left the IR department in unchanged condition      Dr Jeanette Roblero performed and directly supervised the entire procedure      Findings:      Using ultrasound guidance, the left internal jugular vein was cannulated using Seldinger technique  The left internal jugular vein was evaluated as a potential access site  The left internal jugular vein was patent, and free of thrombus  Static images of the vessel was obtained  Visualization of real time needle entry into the vessel was obtained      Fluoroscopic spot image demonstrates a newly placed single lumen chest port via the left internal jugular vein with the most central aspect at the SVC/RA junction   The catheter tubing is smooth in contour         IMPRESSION:     Successful placement of a single lumen chest port via the left internal jugular vein

## 2019-11-12 NOTE — ANESTHESIA POSTPROCEDURE EVALUATION
Post-Op Assessment Note    CV Status:  Stable  Pain Score: 0    Pain management: adequate     Mental Status:  Alert and awake   Hydration Status:  Euvolemic   PONV Controlled:  Controlled   Airway Patency:  Patent   Post Op Vitals Reviewed: Yes      Staff: CRNA           BP     Temp      Pulse  82   Resp      SpO2   98

## 2019-11-12 NOTE — ANESTHESIA PREPROCEDURE EVALUATION
Review of Systems/Medical History  Patient summary reviewed  Chart reviewed  No history of anesthetic complications     Cardiovascular  Exercise tolerance (METS): <4,  Hyperlipidemia, Hypertension , DVT  Comment: Severe sepsis 9/2019 with MSSA bacteremia, port infected  Vegetation on heart valve could not be ruled out 2/2 difficulty with passing probe  Pt treated with 6 weeks of abx started approx 9/21/2019  Now here for port replacement ,  Pulmonary  Negative pulmonary ROS        GI/Hepatic  Negative GI/hepatic ROS   GERD ,   Comment: Difficulty passing TRINH probe    Gastric Bypass     Negative  ROS        Endo/Other  Diabetes type 2 Oral agent,   Comment: Sepsis with MSSA bacteremia Obesity  morbid obesity   GYN    Hysterectomy, Uterine cancer (endometrial Ca, last chemo 9/2019),        Hematology  Anemia (bone marrow suppression 2/2 chemo) ,  Coagulation disorder currently taking oral anticoagulants,    Musculoskeletal  Negative musculoskeletal ROS        Neurology  Negative neurology ROS      Psychology   Depression ,          Physical Exam    Airway    Mallampati score: III  TM Distance: >3 FB  Neck ROM: limited     Dental   No notable dental hx     Cardiovascular  No murmur,     Pulmonary      Other Findings        Anesthesia Plan  ASA Score- 3     Anesthesia Type- IV sedation with anesthesia with ASA Monitors  Additional Monitors:   Airway Plan:         Plan Factors-    Induction- intravenous  Postoperative Plan- Plan for postoperative opioid use  Informed Consent- Anesthetic plan and risks discussed with patient  I personally reviewed this patient with the CRNA  Discussed and agreed on the Anesthesia Plan with the CRNA  Angie Medel

## 2019-11-13 ENCOUNTER — TELEPHONE (OUTPATIENT)
Dept: FAMILY MEDICINE CLINIC | Facility: CLINIC | Age: 64
End: 2019-11-13

## 2019-11-13 NOTE — TELEPHONE ENCOUNTER
Per Dr Eunice Lyons that if pt feels lightheaded (due to low BP) she can hold the Lisinopril  If she feels ok and bp is ok, she can stay on the Lisinopril

## 2019-11-13 NOTE — TELEPHONE ENCOUNTER
Maddison Left w/ [facility] [address] called to make you aware he evaluated pt today  He doesn't feel pt needs OT, but wanted to make you aware that pts BP for today was 90/50

## 2019-11-14 ENCOUNTER — HOSPITAL ENCOUNTER (OUTPATIENT)
Dept: INFUSION CENTER | Facility: CLINIC | Age: 64
Discharge: HOME | End: 2019-11-14
Payer: COMMERCIAL

## 2019-11-14 VITALS — SYSTOLIC BLOOD PRESSURE: 116 MMHG | DIASTOLIC BLOOD PRESSURE: 70 MMHG

## 2019-11-14 DIAGNOSIS — C54.1 ENDOMETRIAL CANCER (HCC): Primary | ICD-10-CM

## 2019-11-14 DIAGNOSIS — Z45.2 ENCOUNTER FOR CENTRAL LINE CARE: ICD-10-CM

## 2019-11-14 LAB
ALBUMIN SERPL BCP-MCNC: 3.2 G/DL (ref 3.5–5)
ALP SERPL-CCNC: 110 U/L (ref 46–116)
ALT SERPL W P-5'-P-CCNC: 17 U/L (ref 12–78)
ANION GAP SERPL CALCULATED.3IONS-SCNC: 11 MMOL/L (ref 4–13)
AST SERPL W P-5'-P-CCNC: 18 U/L (ref 5–45)
BASOPHILS # BLD MANUAL: 0 THOUSAND/UL (ref 0–0.1)
BASOPHILS NFR MAR MANUAL: 0 % (ref 0–1)
BILIRUB SERPL-MCNC: 0.3 MG/DL (ref 0.2–1)
BUN SERPL-MCNC: 12 MG/DL (ref 5–25)
CALCIUM SERPL-MCNC: 9.1 MG/DL (ref 8.3–10.1)
CHLORIDE SERPL-SCNC: 103 MMOL/L (ref 100–108)
CO2 SERPL-SCNC: 25 MMOL/L (ref 21–32)
CREAT SERPL-MCNC: 0.61 MG/DL (ref 0.6–1.3)
EOSINOPHIL # BLD MANUAL: 0.22 THOUSAND/UL (ref 0–0.4)
EOSINOPHIL NFR BLD MANUAL: 1 % (ref 0–6)
ERYTHROCYTE [DISTWIDTH] IN BLOOD BY AUTOMATED COUNT: 15.3 % (ref 11.6–15.1)
GFR SERPL CREATININE-BSD FRML MDRD: 97 ML/MIN/1.73SQ M
GLUCOSE P FAST SERPL-MCNC: 90 MG/DL (ref 65–99)
GLUCOSE SERPL-MCNC: 90 MG/DL (ref 65–140)
HCT VFR BLD AUTO: 32.2 % (ref 34.8–46.1)
HGB BLD-MCNC: 10.4 G/DL (ref 11.5–15.4)
LYMPHOCYTES # BLD AUTO: 0 % (ref 14–44)
LYMPHOCYTES # BLD AUTO: 0 THOUSAND/UL (ref 0.6–4.47)
MAGNESIUM SERPL-MCNC: 1.5 MG/DL (ref 1.6–2.6)
MCH RBC QN AUTO: 30.6 PG (ref 26.8–34.3)
MCHC RBC AUTO-ENTMCNC: 32.3 G/DL (ref 31.4–37.4)
MCV RBC AUTO: 95 FL (ref 82–98)
METAMYELOCYTES NFR BLD MANUAL: 1 % (ref 0–1)
MONOCYTES # BLD AUTO: 0 THOUSAND/UL (ref 0–1.22)
MONOCYTES NFR BLD: 0 % (ref 4–12)
NEUTROPHILS # BLD MANUAL: 21.68 THOUSAND/UL (ref 1.85–7.62)
NEUTS BAND NFR BLD MANUAL: 6 % (ref 0–8)
NEUTS SEG NFR BLD AUTO: 92 % (ref 43–75)
NRBC BLD AUTO-RTO: 0 /100 WBCS
PLATELET # BLD AUTO: 191 THOUSANDS/UL (ref 149–390)
PLATELET BLD QL SMEAR: ADEQUATE
PMV BLD AUTO: 11 FL (ref 8.9–12.7)
POTASSIUM SERPL-SCNC: 3.5 MMOL/L (ref 3.5–5.3)
PROT SERPL-MCNC: 7.3 G/DL (ref 6.4–8.2)
RBC # BLD AUTO: 3.4 MILLION/UL (ref 3.81–5.12)
RBC MORPH BLD: NORMAL
SODIUM SERPL-SCNC: 139 MMOL/L (ref 136–145)
TOTAL CELLS COUNTED SPEC: 100
WBC # BLD AUTO: 22.12 THOUSAND/UL (ref 4.31–10.16)

## 2019-11-14 PROCEDURE — 85027 COMPLETE CBC AUTOMATED: CPT

## 2019-11-14 PROCEDURE — 80053 COMPREHEN METABOLIC PANEL: CPT

## 2019-11-14 PROCEDURE — 85007 BL SMEAR W/DIFF WBC COUNT: CPT

## 2019-11-14 PROCEDURE — 83735 ASSAY OF MAGNESIUM: CPT

## 2019-11-14 NOTE — PLAN OF CARE
Problem: Potential for Falls  Goal: Patient will remain free of falls  Description  INTERVENTIONS:  - Assess patient frequently for physical needs  -  Identify cognitive and physical deficits and behaviors that affect risk of falls    -  [place] fall precautions as indicated by assessment   - Educate patient/family on patient safety including physical limitations  - Instruct patient to call for assistance with activity based on assessment  - Modify environment to reduce risk of injury  - Consider OT/PT consult to assist with strengthening/mobility  Outcome: Progressing

## 2019-11-14 NOTE — PROGRESS NOTES
Patient arrived for central line blood work  Offers no complaints  Blood work drawn with port access  Port flushed per protocol  Patient requested BP be taken also  116/70   Patient verified upcoming appointment and declined AVS

## 2019-11-19 ENCOUNTER — OFFICE VISIT (OUTPATIENT)
Dept: FAMILY MEDICINE CLINIC | Facility: CLINIC | Age: 64
End: 2019-11-19
Payer: COMMERCIAL

## 2019-11-19 VITALS
WEIGHT: 190 LBS | RESPIRATION RATE: 16 BRPM | TEMPERATURE: 99 F | SYSTOLIC BLOOD PRESSURE: 114 MMHG | HEIGHT: 59 IN | DIASTOLIC BLOOD PRESSURE: 66 MMHG | OXYGEN SATURATION: 98 % | BODY MASS INDEX: 38.3 KG/M2 | HEART RATE: 89 BPM

## 2019-11-19 DIAGNOSIS — I10 BENIGN ESSENTIAL HYPERTENSION: Primary | ICD-10-CM

## 2019-11-19 DIAGNOSIS — C54.1 ENDOMETRIAL CANCER (HCC): Chronic | ICD-10-CM

## 2019-11-19 DIAGNOSIS — R73.03 PREDIABETES: ICD-10-CM

## 2019-11-19 PROCEDURE — 99214 OFFICE O/P EST MOD 30 MIN: CPT | Performed by: INTERNAL MEDICINE

## 2019-11-19 RX ORDER — QUINAPRIL 5 1/1
5 TABLET ORAL
Qty: 90 TABLET | Refills: 1
Start: 2019-11-19 | End: 2020-02-03 | Stop reason: SDUPTHER

## 2019-11-19 NOTE — PROGRESS NOTES
Assessment/Plan:         Diagnoses and all orders for this visit:    Benign essential hypertension  -     quinapril (ACCUPRIL) 5 mg tablet; Take 1 tablet (5 mg total) by mouth daily at bedtime          Subjective:      Patient ID: Delores Noguera is a 60 y o  female  Pt relates she has lab q week  She checks her temp daily  Denies fever  Was told to call oncology if temp up to 100  She asked if she can stop lovemox  Told her need to discuss with oncology  She had clott while on xarelto and felt to be from recurrent ca      The following portions of the patient's history were reviewed and updated as appropriate: She  has a past medical history of Anemia, Chemotherapy follow-up examination, Depression, Diabetes mellitus ([address] ), Endometrial cancer ([address] ) (12/2017), Hyperglycemia, Hyperlipidemia, Hypertension, and Obesity    She   Patient Active Problem List    Diagnosis Date Noted    Gross hematuria 10/22/2019    Hypomagnesemia 10/18/2019    Ambulatory dysfunction 10/11/2019    Moderate protein-calorie malnutrition ([address] ) 10/03/2019    Bilateral lower extremity edema 09/30/2019    Generalized weakness 09/29/2019    MSSA bacteremia 09/20/2019    Hypokalemia 09/19/2019    Hyponatremia 09/19/2019    Dehydration 09/18/2019    Chemotherapy-induced nausea 09/18/2019    Anemia 09/06/2019    Chemotherapy induced neutropenia ([address] ) 08/30/2019    Cancer related pain 07/19/2019    Other hydronephrosis 07/16/2019    Encounter for central line care 07/16/2019    Acute deep vein thrombosis (DVT) of proximal vein of lower extremity ([address] ) 06/19/2019    Breast cancer screening 06/19/2019    Benign essential hypertension 12/19/2017    Class 3 severe obesity with body mass index (BMI) of 45 0 to 49 9 in adult [facility]) 12/19/2017    Prediabetes 12/19/2017    Endometrial cancer ([address] ) 11/29/2017    Dyslipidemia 04/01/2014    Major depression, chronic 10/07/2013     She  has a past surgical history that includes Abdominal surgery; Colonoscopy; pr lap, radical hyst w/ tube&ov, node bx (N/A, 12/19/2017); pr lap,diagnostic abdomen (N/A, 12/19/2017); Cholecystectomy; Gastric bypass; Tonsillectomy; Hysterectomy (Bilateral); Oophorectomy (Bilateral); US guided breast biopsy right complete (Right, 6/28/2019); CT needle biopsy lymph node (7/8/2019); IR port Placement (7/26/2019); IR tube placement nephrostomy (7/26/2019); IR port Removal (9/20/2019); IR PICC line (9/27/2019); pr insj tunneled ctr vad w/subq port age 13 yr/> (Left, 11/12/2019); and FL guided central venous access device insertion (11/12/2019)  Her family history includes Bone cancer in her maternal grandfather; Lymphoma in her father; No Known Problems in her brother, brother, maternal aunt, maternal aunt, maternal aunt, maternal aunt, maternal grandmother, paternal aunt, paternal aunt, paternal aunt, paternal aunt, paternal grandfather, paternal grandmother, and sister; Other in her mother; Ovarian cancer (age of onset: 48) in her mother  She  reports that she has never smoked  She has never used smokeless tobacco  She reports that she drank alcohol  She reports that she does not use drugs    Current Outpatient Medications   Medication Sig Dispense Refill    acetaminophen (TYLENOL) 325 mg tablet Take 2 tablets (650 mg total) by mouth every 6 (six) hours as needed for mild pain, headaches or fever 30 tablet 0    ARIPiprazole (ABILIFY) 10 mg tablet Take 10 mg by mouth daily      aspirin (ECOTRIN LOW STRENGTH) 81 mg EC tablet Take 81 mg by mouth daily      cholecalciferol (VITAMIN D3) 1,000 units tablet Take 1,000 Units by mouth daily      enoxaparin (LOVENOX) 100 mg/mL Inject 0 9 mL (90 mg total) under the skin every 12 (twelve) hours 60 Syringe 3    folic acid (FOLVITE) 1 mg tablet Take 1 tablet (1 mg total) by mouth daily  0    gemfibrozil (LOPID) 600 mg tablet TAKE 1 TABLET BY MOUTH DAILY 90 tablet 1    LORazepam (ATIVAN) 1 mg tablet Take 1 tablet (1 mg total) by mouth every 6 (six) hours as needed for anxiety (or nausea) for up to 10 days 10 tablet 0    MYRBETRIQ 50 MG TB24 TAKE 1 TABLET DAILY 30 tablet 10    omeprazole (PriLOSEC) 20 mg delayed release capsule Take 20 mg by mouth daily      ondansetron (ZOFRAN) 8 mg tablet Take 1 tablet (8 mg total) by mouth every 8 (eight) hours as needed for nausea or vomiting 30 tablet 1    Pegfilgrastim (NEULASTA DELIVERY KIT SC) Inject under the skin every 21 days      saccharomyces boulardii (FLORASTOR) 250 mg capsule Take 1 capsule (250 mg total) by mouth 2 (two) times a day  0    venlafaxine (EFFEXOR) 75 mg tablet Take 75 mg by mouth 3 (three) times a day        quinapril (ACCUPRIL) 5 mg tablet Take 1 tablet (5 mg total) by mouth daily at bedtime 90 tablet 1     No current facility-administered medications for this visit        Current Outpatient Medications on File Prior to Visit   Medication Sig    acetaminophen (TYLENOL) 325 mg tablet Take 2 tablets (650 mg total) by mouth every 6 (six) hours as needed for mild pain, headaches or fever    ARIPiprazole (ABILIFY) 10 mg tablet Take 10 mg by mouth daily    aspirin (ECOTRIN LOW STRENGTH) 81 mg EC tablet Take 81 mg by mouth daily    cholecalciferol (VITAMIN D3) 1,000 units tablet Take 1,000 Units by mouth daily    enoxaparin (LOVENOX) 100 mg/mL Inject 0 9 mL (90 mg total) under the skin every 12 (twelve) hours    folic acid (FOLVITE) 1 mg tablet Take 1 tablet (1 mg total) by mouth daily    gemfibrozil (LOPID) 600 mg tablet TAKE 1 TABLET BY MOUTH DAILY    LORazepam (ATIVAN) 1 mg tablet Take 1 tablet (1 mg total) by mouth every 6 (six) hours as needed for anxiety (or nausea) for up to 10 days    MYRBETRIQ 50 MG TB24 TAKE 1 TABLET DAILY    omeprazole (PriLOSEC) 20 mg delayed release capsule Take 20 mg by mouth daily    ondansetron (ZOFRAN) 8 mg tablet Take 1 tablet (8 mg total) by mouth every 8 (eight) hours as needed for nausea or vomiting    Pegfilgrastim (NEULASTA DELIVERY KIT SC) Inject under the skin every 21 days    saccharomyces boulardii (FLORASTOR) 250 mg capsule Take 1 capsule (250 mg total) by mouth 2 (two) times a day    venlafaxine (EFFEXOR) 75 mg tablet Take 75 mg by mouth 3 (three) times a day      [DISCONTINUED] lisinopril (ZESTRIL) 5 mg tablet Take 1 tablet (5 mg total) by mouth daily     No current facility-administered medications on file prior to visit  She is allergic to cephalosporins       Review of Systems   Constitutional: Negative  HENT: Negative  Respiratory: Negative  Cardiovascular: Negative  Gastrointestinal: Negative  Objective:      /66 (BP Location: Left arm, Patient Position: Sitting, Cuff Size: Large)   Pulse 89   Temp 99 °F (37 2 °C) (Temporal)   Resp 16   Ht 4' 11" (1 499 m)   Wt 86 2 kg (190 lb)   LMP  (LMP Unknown)   SpO2 98%   BMI 38 38 kg/m²          Physical Exam   Constitutional: She appears well-developed and well-nourished  No distress  HENT:   Head: Normocephalic and atraumatic  Right Ear: External ear normal    Left Ear: External ear normal    Nose: Nose normal    Mouth/Throat: Oropharynx is clear and moist  No oropharyngeal exudate  Neck: Normal range of motion  Neck supple  No tracheal deviation present  No thyromegaly present  Cardiovascular: Normal rate, regular rhythm, normal heart sounds and intact distal pulses  Exam reveals no gallop and no friction rub  No murmur heard  Pulmonary/Chest: Effort normal and breath sounds normal  No stridor  No respiratory distress  She has no wheezes  She has no rales  She exhibits no tenderness  Abdominal: Soft  Bowel sounds are normal  She exhibits no distension  There is no tenderness  Lymphadenopathy:     She has no cervical adenopathy  Skin: She is not diaphoretic

## 2019-11-21 ENCOUNTER — HOSPITAL ENCOUNTER (OUTPATIENT)
Dept: INFUSION CENTER | Facility: CLINIC | Age: 64
Discharge: HOME | End: 2019-11-21
Payer: COMMERCIAL

## 2019-11-21 ENCOUNTER — TELEPHONE (OUTPATIENT)
Dept: FAMILY MEDICINE CLINIC | Facility: CLINIC | Age: 64
End: 2019-11-21

## 2019-11-21 ENCOUNTER — TELEPHONE (OUTPATIENT)
Dept: GYNECOLOGIC ONCOLOGY | Facility: CLINIC | Age: 64
End: 2019-11-21

## 2019-11-21 DIAGNOSIS — Z45.2 ENCOUNTER FOR CENTRAL LINE CARE: Primary | ICD-10-CM

## 2019-11-21 DIAGNOSIS — C54.1 ENDOMETRIAL CANCER (HCC): ICD-10-CM

## 2019-11-21 LAB
ALBUMIN SERPL BCP-MCNC: 3.1 G/DL (ref 3.5–5)
ALP SERPL-CCNC: 134 U/L (ref 46–116)
ALT SERPL W P-5'-P-CCNC: 21 U/L (ref 12–78)
ANION GAP SERPL CALCULATED.3IONS-SCNC: 13 MMOL/L (ref 4–13)
ANISOCYTOSIS BLD QL SMEAR: PRESENT
AST SERPL W P-5'-P-CCNC: 27 U/L (ref 5–45)
BASOPHILS # BLD MANUAL: 0 THOUSAND/UL (ref 0–0.1)
BASOPHILS NFR MAR MANUAL: 0 % (ref 0–1)
BILIRUB SERPL-MCNC: 0.2 MG/DL (ref 0.2–1)
BUN SERPL-MCNC: 12 MG/DL (ref 5–25)
CALCIUM SERPL-MCNC: 9.2 MG/DL (ref 8.3–10.1)
CHLORIDE SERPL-SCNC: 105 MMOL/L (ref 100–108)
CO2 SERPL-SCNC: 19 MMOL/L (ref 21–32)
CREAT SERPL-MCNC: 0.6 MG/DL (ref 0.6–1.3)
EOSINOPHIL # BLD MANUAL: 0 THOUSAND/UL (ref 0–0.4)
EOSINOPHIL NFR BLD MANUAL: 0 % (ref 0–6)
ERYTHROCYTE [DISTWIDTH] IN BLOOD BY AUTOMATED COUNT: 15 % (ref 11.6–15.1)
GFR SERPL CREATININE-BSD FRML MDRD: 98 ML/MIN/1.73SQ M
GLUCOSE SERPL-MCNC: 87 MG/DL (ref 65–140)
HCT VFR BLD AUTO: 28.7 % (ref 34.8–46.1)
HGB BLD-MCNC: 9.1 G/DL (ref 11.5–15.4)
LYMPHOCYTES # BLD AUTO: 2.51 THOUSAND/UL (ref 0.6–4.47)
LYMPHOCYTES # BLD AUTO: 8 % (ref 14–44)
MAGNESIUM SERPL-MCNC: 1.8 MG/DL (ref 1.6–2.6)
MCH RBC QN AUTO: 30.1 PG (ref 26.8–34.3)
MCHC RBC AUTO-ENTMCNC: 31.7 G/DL (ref 31.4–37.4)
MCV RBC AUTO: 95 FL (ref 82–98)
METAMYELOCYTES NFR BLD MANUAL: 9 % (ref 0–1)
MONOCYTES # BLD AUTO: 2.83 THOUSAND/UL (ref 0–1.22)
MONOCYTES NFR BLD: 9 % (ref 4–12)
MYELOCYTES NFR BLD MANUAL: 2 % (ref 0–1)
NEUTROPHILS # BLD MANUAL: 22.62 THOUSAND/UL (ref 1.85–7.62)
NEUTS BAND NFR BLD MANUAL: 9 % (ref 0–8)
NEUTS SEG NFR BLD AUTO: 63 % (ref 43–75)
NRBC BLD AUTO-RTO: 0 /100 WBCS
PLATELET # BLD AUTO: 225 THOUSANDS/UL (ref 149–390)
PLATELET BLD QL SMEAR: ADEQUATE
PMV BLD AUTO: 11.2 FL (ref 8.9–12.7)
POTASSIUM SERPL-SCNC: 3.6 MMOL/L (ref 3.5–5.3)
PROT SERPL-MCNC: 7.2 G/DL (ref 6.4–8.2)
RBC # BLD AUTO: 3.02 MILLION/UL (ref 3.81–5.12)
SODIUM SERPL-SCNC: 137 MMOL/L (ref 136–145)
TOTAL CELLS COUNTED SPEC: 100
TOXIC GRANULES BLD QL SMEAR: PRESENT
WBC # BLD AUTO: 31.42 THOUSAND/UL (ref 4.31–10.16)

## 2019-11-21 PROCEDURE — 85027 COMPLETE CBC AUTOMATED: CPT

## 2019-11-21 PROCEDURE — 85007 BL SMEAR W/DIFF WBC COUNT: CPT

## 2019-11-21 PROCEDURE — 80053 COMPREHEN METABOLIC PANEL: CPT

## 2019-11-21 PROCEDURE — 83735 ASSAY OF MAGNESIUM: CPT

## 2019-11-22 NOTE — PROGRESS NOTES
Outpatient Oncology Nutrition Consult  Type of Consult: Follow Up  Care Location: Office Visit  - met with patient and sister Gisela Jenkins)  Nutrition Assessment:  PMH: anemia, depression, DM, HLD, HTN, obesity, RNYGB (2001 at [facility])  11/2719: Pt reports that on Monday her nephrostomy bag was capped and she can now urinate normally  Oncology Diagnosis & Treatments: Endometrial cancer  S/p surgery 12/19/17  Recurrence 7/8/19  Undergoing chemotherapy (started 7/30/19; carboplatin, taxol)  Then for potential radiation therapy  [facility]omet[facility] )    11/17/2017 Initial Diagnosis     Endometrial cancer ([facility] )      12/19/2017 Surgery     Robotic assisted total laparoscopic hysterectomy with bilateral salpingo-oophorectomy and sentinel bilateral pelvic lymph node dissection  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 (4 4 x 3 2 cm tumor, 9 4/15 4mm invasion, NO LVSI, washings revealed atypical cellular changes)      12/19/2017 Genetic Testing     Morrison testing negative      7/8/2019 Recurrence     Presented with right lower extremity DVT and CT demonstrating right pelvic sidewall mass with venous, ureteral and nerve compression causing significant neuropathic pain  Core biopsy demonstrates high-grade carcinoma        7/30/2019 -  Chemotherapy     palonosetron (ALOXI) injection 0 25 mg, 0 25 mg, Intravenous, Once, 4 of 6 cycles  Administration: 0 25 mg (7/30/2019), 0 25 mg (8/19/2019), 0 25 mg (9/9/2019), 0 25 mg (11/11/2019)  pegfilgrastim (NEULASTA ONPRO) subcutaneous injection kit 6 mg, 6 mg, Subcutaneous, Once, 2 of 4 cycles  Administration: 6 mg (9/9/2019), 6 mg (11/11/2019)  fosaprepitant (EMEND) 150 mg in sodium chloride 0 9 % 255 mL IVPB, 150 mg, Intravenous, Once, 4 of 6 cycles  Administration: 150 mg (7/30/2019), 150 mg (8/19/2019), 150 mg (9/9/2019), 150 mg (11/11/2019)  CARBOplatin (PARAPLATIN) 574 2 mg in sodium chloride 0 9 % 250 mL IVPB, 574 2 mg, Intravenous, Once, 4 of 6 cycles  Administration: 574 2 mg (7/30/2019), 553 2 mg (8/19/2019), 678 6 mg (9/9/2019), 506 mg (11/11/2019)  PACLitaxel (TAXOL) 337 8 mg in sodium chloride 0 9 % 500 mL chemo IVPB, 175 mg/m2 = 337 8 mg, Intravenous, Once, 4 of 6 cycles  Dose modification: 135 mg/m2 (original dose 175 mg/m2, Cycle 4, Reason: Other (See Comments), Comment: prior hospital admission/complications)  Administration: 337 8 mg (7/30/2019), 327 mg (8/19/2019), 327 mg (9/9/2019), 252 6 mg (11/11/2019)       Past Medical History:   Diagnosis Date    Anemia     Chemotherapy follow-up examination     Depression     Diabetes [facility] )     Endometrial cancer [address] ) 12/2017    Hyperglycemia     vx type 2 dm -- last assessed 4/1/14; resolved 11/7/17    Hyperlipidemia     Hypertension     Obesity     last assessed 10/14/17; resolved 11/7/17     Past Surgical History:   Procedure Laterality Date    ABDOMINAL SURGERY      GASTRIC BYPASS    CHOLECYSTECTOMY      at the time of gastric bypass    COLONOSCOPY      CT NEEDLE BIOPSY LYMPH NODE  7/8/2019    FL GUIDED CENTRAL VENOUS ACCESS DEVICE INSERTION  11/12/2019    GASTRIC BYPASS      HYSTERECTOMY Bilateral     total abdominal with salpingo-oophorectomy    IR PICC LINE  9/27/2019    IR PORT PLACEMENT  7/26/2019    IR PORT REMOVAL  9/20/2019    IR TUBE PLACEMENT NEPHROSTOMY  7/26/2019    OOPHORECTOMY Bilateral     ME INSJ TUNNELED CTR VAD W/SUBQ PORT AGE 5 YR/> Left 11/12/2019    Procedure: INSERTION VENOUS PORT ( PORT-A-CATH) IR;  Surgeon: Toña Bills DO;  [facility];  Service: Interventional Radiology    ME LAP, RADICAL HYST W/ TUBE&OV, NODE BX N/A 12/19/2017    Procedure: HYSTERECTOMY LAPAROSCOPIC TOTAL ([address]) W/ ROBOTICS; BILATERAL SALPINGOOPHERECTOMY; LYMPH NODE DISSECTION; lysis of adhesions;  Surgeon: Drew Mullen MD;  [facility];  Service: Gynecology Oncology    ME LAP,DIAGNOSTIC ABDOMEN N/A 12/19/2017    Procedure: LAPAROSCOPY DIAGNOSTIC; Surgeon: Mykel Johns MD;  [facility];  Service: Gynecology Oncology    TONSILLECTOMY      US GUIDED BREAST BIOPSY RIGHT COMPLETE Right 6/28/2019       Review of Medications:   Vitamins, Supplements and Herbals: yes: Vitamin D and Folic Acid, Align;  Now taking: calcium citrate TID, B12 1000 mcg QD, MVI BID (unsure if it has iron; she will check to make sure she is getting 45 mg iron daily)     Current Outpatient Medications:     acetaminophen (TYLENOL) 325 mg tablet, Take 2 tablets (650 mg total) by mouth every 6 (six) hours as needed for mild pain, headaches or fever, Disp: 30 tablet, Rfl: 0    ARIPiprazole (ABILIFY) 10 mg tablet, Take 10 mg by mouth daily, Disp: , Rfl:     aspirin (ECOTRIN LOW STRENGTH) 81 mg EC tablet, Take 81 mg by mouth daily, Disp: , Rfl:     cholecalciferol (VITAMIN D3) 1,000 units tablet, Take 1,000 Units by mouth daily, Disp: , Rfl:     enoxaparin (LOVENOX) 100 mg/mL, Inject 0 9 mL (90 mg total) under the skin every 12 (twelve) hours, Disp: 60 Syringe, Rfl: 3    folic acid (FOLVITE) 1 mg tablet, Take 1 tablet (1 mg total) by mouth daily, Disp: , Rfl: 0    gemfibrozil (LOPID) 600 mg tablet, TAKE 1 TABLET BY MOUTH DAILY, Disp: 90 tablet, Rfl: 1    LORazepam (ATIVAN) 1 mg tablet, Take 1 tablet (1 mg total) by mouth every 6 (six) hours as needed for anxiety (or nausea) for up to 10 days, Disp: 10 tablet, Rfl: 0    MYRBETRIQ 50 MG TB24, TAKE 1 TABLET DAILY, Disp: 30 tablet, Rfl: 10    omeprazole (PriLOSEC) 20 mg delayed release capsule, Take 20 mg by mouth daily, Disp: , Rfl:     ondansetron (ZOFRAN) 8 mg tablet, Take 1 tablet (8 mg total) by mouth every 8 (eight) hours as needed for nausea or vomiting, Disp: 30 tablet, Rfl: 1    Pegfilgrastim (NEULASTA DELIVERY KIT SC), Inject under the skin every 21 days, Disp: , Rfl:     quinapril (ACCUPRIL) 5 mg tablet, Take 1 tablet (5 mg total) by mouth daily at bedtime, Disp: 90 tablet, Rfl: 1    saccharomyces boulardii (FLORASTOR) 250 mg capsule, Take 1 capsule (250 mg total) by mouth 2 (two) times a day, Disp: , Rfl: 0    venlafaxine (EFFEXOR) 75 mg tablet, Take 75 mg by mouth 3 (three) times a day  , Disp: , Rfl:     Most Recent Lab Results: Does not check BG  Lab Results   Component Value Date    WBC 31 42 (HH) 11/21/2019    IRON 24 (L) 10/02/2019    TIBC 138 (L) 10/02/2019    FERRITIN 1,042 (H) 10/02/2019    CHOL 183 10/27/2017    TRIG 88 11/01/2018    HDL 59 11/01/2018    LDLCALC 106 (H) 10/27/2017    ALT 21 11/21/2019    AST 27 11/21/2019     10/27/2017     05/12/2017    K 3 6 11/21/2019    K 3 5 11/14/2019    BUN 12 11/21/2019    BUN 12 11/14/2019    CREATININE 0 60 11/21/2019    CREATININE 0 61 11/14/2019    PHOS 3 2 07/08/2019    GLUCOSE 219 (H) 12/19/2017    GLUCOSE 111 (H) 10/27/2017    HGBA1C 6 2 08/28/2019    HGBA1C 5 5 11/01/2018    HGBA1C 5 6 03/12/2018    CALCIUM 9 2 11/21/2019    FOLATE 5 4 10/06/2019    MG 1 8 11/21/2019       Anthropometric Measurements:   Height: 59"  Ht Readings from Last 1 Encounters:   11/25/19 4' 11" (1 499 m)     -Weight History:   Usual Weight: 270# before RNYGB in 2001; lowest post-op wt: 200#; wt typically fluctuates between 215-230#  Ideal Body Weight: 95#  Wt Readings from Last 20 Encounters:   11/27/19 88 kg (194 lb)   11/25/19 87 5 kg (193 lb)   11/19/19 86 2 kg (190 lb)   11/11/19 86 2 kg (190 lb)   11/07/19 86 7 kg (191 lb 3 2 oz)   11/06/19 87 5 kg (193 lb)   10/29/19 88 8 kg (195 lb 12 8 oz)   10/22/19 92 1 kg (203 lb 0 7 oz)   10/17/19 90 7 kg (200 lb)   10/15/19 90 7 kg (200 lb)   10/02/19 89 kg (196 lb 3 4 oz)   09/29/19 89 kg (196 lb 3 4 oz)   09/28/19 88 9 kg (196 lb)   09/12/19 91 2 kg (201 lb)   09/09/19 93 4 kg (206 lb)   09/06/19 93 9 kg (207 lb)   09/04/19 93 4 kg (206 lb)   08/28/19 88 kg (194 lb)   08/19/19 90 7 kg (200 lb)   08/02/19 94 3 kg (207 lb 12 8 oz)     Weight Changes: gain of 1 9#/ (0 9%) in 1 month (not significant);  States she had been losing 10# every month since her cancer dx in June 2019  Estimated body mass index is 39 18 kg/m² as calculated from the following:    Height as of 11/25/19: 4' 11" (1 499 m)  Weight as of this encounter: 88 kg (194 lb)  Nutrition-Focused Physical Findings: none observed     Food/Nutrition-Related History & Client/Social History:    Current Nutrition Impact Symptoms:  [] Nausea  [] Reduced Appetite - forces self to eat when not hungry (r/t chemo) [] Acid Reflux    [] Vomiting - now taking meds with meals [x] Unintended Wt Loss - ongoing since cancer dx [] Malabsorption    [] Diarrhea  [] Unintended Wt Gain  [] Dumping Syndrome   [] Constipation - BM's now daily and solid/slightly hard [] Thick Mucous/Secretions  [] Abdominal Pain    [] Dysgeusia (Altered Taste) [x] Xerostomia (Dry Mouth) - using Biotene toothpaste and rinse [] Gas    [] Dysosmia (Altered Smell)  [] Thrush  [] Difficulty Chewing    [] Oral Mucositis (Sore Mouth)  [] Fatigue   [] Other:    [] Odynophagia   [] Esophagitis  [] Other:    [] Dysphagia - continues to do well with this [] Early Satiety  [] No Problems Eating      Food Allergies: yes: had skin patch testing which showed allergy to strawberries, but is able to eat strawberries without any side effects  Food Intolerances: no    Current Diet: Regular Diet, No Restrictions; avoids high sugar foods/fluids as they trigger nausea since RNYGB  Current Nutrition Intake: Increased since last visit  Appetite: Good  Nutrition Route: PO  Meal planning/preparation mainly done by: mother does cooking; sister does shopping  Activity level: states she has been much more active since she moved in with her sister 1 week ago, also doing home PT exercises  Social Hx: 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  Sister lives in [place]  Brother lives in [place]  Discharged from [place] on 11/6/19  Now living with sister and mother      [facility] Recall:  6am: folic acid, 1 MVI; Greek yogurt with peaches/pears/pineapple  9am: 500 mg calcium citrate, 1000 mcg B12, 2 egg and cheese snack wraps from [employer], iced tea  1pm: 500 mg calcium citrate, 1 MVI, [employer]: 1/2 rack ribs, Vidal salad, french fries, iced tea  4pm: 1/2 salami and cheese sandwich  6:30pm: cheese tortellini with tomato sauce (would have had a vegetable salad but was full from 1/2 sandwich)  9pm: 500 mg calcium citrate, 3 cookies    Beverages: water (16 9 oz x4-5),  unsweetened iced tea with Splenda (24 oz x1-2; sometimes has decaf sweetened tea made from a powder), 2% milk (with cereal; dislikes whole milk); does not drink coffee or alcohol  Does not separate fluids from solids, does not have discomfort while eating and drinking at the same time  Supplements: [employer] during week of chemo or sometimes for breakfast; sometimes has a [employer] None   Dislikes    11/11/19 Re-Estimated Nutrition Needs: (based on 54kg/ 118 8# adjusted wt)    Energy Needs: 8488-1963 kcal/day (35-40 kcal/kg)  Protein Needs:  grams/day (1 5-2 g/kg)  Fluid Needs: 6242-0488 mL/day (1 mL/kcal) - 63-72 oz    Discussion/Summary: Cathryn Zapata is here today for RD follow up  She is doing very well today  She feels great and reports minimal nutrition impact sx  She said she recently had a dose reduction in her chemotherapy  She is eating well and has stopped losing wt  She started her post-RNYGB vitamin regimen, she just needs to make sure her MVI has a total of 45 mg iron in it  She has been eating more F+V and using [employer] or [id number] as supplements on occasion  She will continue her current nutrition plan of care and we will follow up by phone in early January     Discussed/Reviewed:   · weight management  · indications & use of oral nutrition supplements - [place] Instant Breakfast or homemade shakes/smoothies as needed  · how to modify foods for anticipated nutrition impact symptoms pt may experience during CA tx  · high protein foods to include at all meals and snacks [place] yogurt with fruit, trail mix, oral nutrition supplements, eggs, meats, poultry, fish, cheese sticks, pudding, peanut butter, nuts, rolled up chicken/turkey/ham, hummus  · ways to increase overall calorie intake - add butter and cheese to potatoes and vegetables; add cheese/hard boiled eggs/extra dressing to salads; eat 6 small meals each day, do not skip meals  · encouraged eating every 2-3 hours (5-6 small meals/day)  · adequate hydation & tips to increase overall fluid intake    · MNT for: RNY Gastric Bypass, wt loss  · recipe suggestions/resources   · Weigh self on home scale M-W-F mornings  · Try High protein cereals such as: Kashi and Special K protein cereal (can mix these with your favorite cereals)  All questions and concerns addressed during todays visit  Suzan Reynoso has RD contact information  Nutrition Diagnosis:  · Inadequate Energy Intake related to physiological causes, disease state and treatment related issues as evidenced by food recall, wt loss and discussion with pt and/or family  · Increased Nutrient Needs (kcal & pro) related to increased demand for nutrients and disease state as evidenced by cancer dx and pt undergoing tx for cancer        Intervention & Recommendations:  Topics addressed: Nutrition education, Balance/Variety, Meals & Snacks, Meal planning, Choosing high protein meals/snacks, Meal pattern: eating small/frequent meals (every 2-3 hours), Nutrition Symptom Management, Adequate Hydration, Medical Food Supplements, Nutrition-Related Medication Management, Vitamin & Mineral Supplements and Weight Management    Barriers: None  Readiness to change: action  Comprehension: verbalizes understanding  Expected Compliance: good    Materials Provided: not applicable      Monitoring & Evaluation:  Dietitian to Monitor: Food and Nutrition Intake, Nutrtion Impact Symptoms, Body Weight, Vitamin/Mineral Intake and Biochemical Data     Goals:  · weight stabilization  · adequate nutrition related symptom management  · pt to meet >/=75% estimated nutrition needs daily  · start vitamin and mineral supplementation for RNYGB    · Progress Towards Goals: Progressing     Nutrition Rx & Recommendations:  · Diet: High Calorie, High Protein (for high calorie foods see pages 52-53, and for high protein foods see pages 49-51 in your Eating Hints book)  · Nutrition Supplements: - [place] Instant Breakfast or homemade shakes/smoothies as needed  May use homemade shakes/smoothies as desired  If using a pre-made shake/bar, choose ones with >300 calories and >10 grams protein (ex  Ensure Enlive, Ensure Plus, Boost Plus, Boost Very High Calorie, etc )  · Small, frequent meals/snacks may be easier to tolerate than 3 large daily meals  Aim for 5-6 small meals per day (every 2-3 hours)  · Include protein at all meals/snacks  · Stay hydrated by sipping fluids of choice/tolerance throughout the day  · Refer to Eating Hints book for other meal/snack ideas and symptom management  · Weigh yourself regularly  If you notice weight loss, make an effort to increase your daily food/calorie intake  If you continue to notice loss after these efforts, reach out to your dietitian to establish a plan to stabilize weight    · High calorie foods to try: - add butter and cheese to potatoes and vegetables; add cheese/hard boiled eggs/extra dressing to salads; eat 6 small meals each day, do not skip meals  (see pages 52-53 in your Eating Hints book)  · High protein foods to try: [place] yogurt with fruit, trail mix, oral nutrition supplements, eggs, meats, poultry, fish, cheese sticks, pudding, peanut butter, nuts, rolled up chicken/turkey/ham, hummus (see pages 49-51 in your Eating Hints book)   · Try High protein cereals such as: Kashi and Special K protein cereal (can mix these with your favorite cereals)  · Do not skip meals  Eat 6 times per day  · Weigh yourself on Monday, Wednesday, and Friday mornings    · Lifetime supplements recommended for Gastric Bypass:  · Multivitamin BID - (check to make sure if it has a total of 45 mg iron in it)  · Iron 45 mg daily  · Vitamin D daily  · Calcium Citrate 500 mg TID (do not take with iron, needs to be 2 hrs apart)  · B12 1000 mcg daily    Follow Up Plan: 1/8/20 at 10am for a phone follow up  Recommend Referral to Other Providers: none at this time

## 2019-11-24 DIAGNOSIS — I10 BENIGN ESSENTIAL HYPERTENSION: ICD-10-CM

## 2019-11-25 ENCOUNTER — TELEPHONE (OUTPATIENT)
Dept: UROLOGY | Facility: AMBULATORY SURGERY CENTER | Age: 64
End: 2019-11-25

## 2019-11-25 ENCOUNTER — OFFICE VISIT (OUTPATIENT)
Dept: UROLOGY | Facility: AMBULATORY SURGERY CENTER | Age: 64
End: 2019-11-25
Payer: COMMERCIAL

## 2019-11-25 VITALS
HEIGHT: 59 IN | HEART RATE: 91 BPM | DIASTOLIC BLOOD PRESSURE: 90 MMHG | BODY MASS INDEX: 38.91 KG/M2 | WEIGHT: 193 LBS | SYSTOLIC BLOOD PRESSURE: 142 MMHG

## 2019-11-25 DIAGNOSIS — N13.39 OTHER HYDRONEPHROSIS: Primary | ICD-10-CM

## 2019-11-25 PROCEDURE — 99213 OFFICE O/P EST LOW 20 MIN: CPT | Performed by: UROLOGY

## 2019-11-25 RX ORDER — QUINAPRIL 5 1/1
TABLET ORAL
Qty: 90 TABLET | Refills: 1 | OUTPATIENT
Start: 2019-11-25

## 2019-11-25 NOTE — TELEPHONE ENCOUNTER
Shalini Calloway is unsure why a VNA order was placed as Citlalli Zazueta has had nephrostomy tube and has been taking care of it   Jose Dash  Will discuss with Dr Juanita Cha tomorrow

## 2019-11-25 NOTE — PROGRESS NOTES
Assessment/Plan:    Other hydronephrosis  We will proceed with capping the nephrostomy tube today  The patient will need to continue to flush the tube  We will need to keep an eye on her kidney function to make sure does not worsen with the nephrostomy capped  We discussed signs of obstruction including leakage around the nephrostomy site  The patient will follow up in 3 months  Diagnoses and all orders for this visit:    Other hydronephrosis          Total visit time was 15 minutes of which over 50% was spent on counseling  Subjective:     Patient ID: Manuel Harrell is a 60 y o  female    71-year-old female with history of endometrial cancer status post right nephrostomy tube placement presents for follow-up  She was recently seen in the hospital for gross hematuria but this has resolved  She states occasionally her urine gets slightly pink  She is wondering if we can cap her nephrostomy tube  She has no other complaints  The following portions of the patient's history were reviewed and updated as appropriate: allergies, current medications, past family history, past medical history, past social history, past surgical history and problem list     Review of Systems   Constitutional: Negative  HENT: Negative  Eyes: Negative  Respiratory: Negative  Cardiovascular: Negative  Gastrointestinal: Negative  Endocrine: Negative  Genitourinary:        As noted per HPI   Musculoskeletal: Negative  Skin: Negative  Allergic/Immunologic: Negative  Neurological: Negative  Hematological: Negative  Psychiatric/Behavioral: Negative  Objective:    Physical Exam   Constitutional: She is oriented to person, place, and time  She appears well-developed and well-nourished  HENT:   Head: Normocephalic and atraumatic  Neck: Normal range of motion  Neck supple  Cardiovascular: Intact distal pulses  Pulmonary/Chest: Effort normal    Abdominal: Soft   Bowel sounds are normal  She exhibits no distension and no mass  There is no tenderness  There is no rebound and no guarding  Genitourinary:   Genitourinary Comments: Nephrostomy site clean dry and intact  Bag with clear yellow urine  Musculoskeletal: Normal range of motion  Neurological: She is alert and oriented to person, place, and time  Skin: Skin is warm and dry  Psychiatric: She has a normal mood and affect  Vitals reviewed          Results  No results found for: PSA  Lab Results   Component Value Date    GLUCOSE 219 (H) 12/19/2017    CALCIUM 9 2 11/21/2019     10/27/2017    K 3 6 11/21/2019    CO2 19 (L) 11/21/2019     11/21/2019    BUN 12 11/21/2019    CREATININE 0 60 11/21/2019     Lab Results   Component Value Date    WBC 31 42 (HH) 11/21/2019    HGB 9 1 (L) 11/21/2019    HCT 28 7 (L) 11/21/2019    MCV 95 11/21/2019     11/21/2019       No results found for this or any previous visit (from the past 1 hour(s)) ]

## 2019-11-25 NOTE — TELEPHONE ENCOUNTER
Patient managed by Car Velasco visiting nurse is calling to question why order was placed to follow up on nephrostomy tube  Would like to speak to nurse

## 2019-11-25 NOTE — ASSESSMENT & PLAN NOTE
We will proceed with capping the nephrostomy tube today  The patient will need to continue to flush the tube  We will need to keep an eye on her kidney function to make sure does not worsen with the nephrostomy capped  We discussed signs of obstruction including leakage around the nephrostomy site  The patient will follow up in 3 months

## 2019-11-26 ENCOUNTER — HOSPITAL ENCOUNTER (OUTPATIENT)
Dept: RADIOLOGY | Facility: HOSPITAL | Age: 64
Discharge: HOME | End: 2019-11-26
Attending: OBSTETRICS & GYNECOLOGY

## 2019-11-26 DIAGNOSIS — C54.1 ENDOMETRIAL CANCER (HCC): ICD-10-CM

## 2019-11-26 NOTE — SEDATION DOCUMENTATION
Pt had a left port site checked  Per Roseanne Fitz no signs of infection  Pt is afebrile, site is bruised from recent implantation, pain has resolved since she called in for this appt

## 2019-11-26 NOTE — H&P
Interventional Radiology  History and Physical 11/26/2019     History of Present Illness:  60year old female with endometrial cancer s/p left chest port placement on 11/12/2019 returns due to concerns for port infection      Past Medical History:   Diagnosis Date    Anemia     Chemotherapy follow-up examination     Depression     Diabetes mellitus ([facility] [address] )     Endometrial cancer ([facility] [address] ) 12/2017    Hyperglycemia     vx type 2 dm -- last assessed 4/1/14; resolved 11/7/17    Hyperlipidemia     Hypertension     Obesity     last assessed 10/14/17; resolved 11/7/17        Past Surgical History:   Procedure Laterality Date    ABDOMINAL SURGERY      GASTRIC BYPASS    CHOLECYSTECTOMY      at the time of gastric bypass    COLONOSCOPY      CT NEEDLE BIOPSY LYMPH NODE  7/8/2019    FL GUIDED CENTRAL VENOUS ACCESS DEVICE INSERTION  11/12/2019    GASTRIC BYPASS      HYSTERECTOMY Bilateral     total abdominal with salpingo-oophorectomy    IR PICC LINE  9/27/2019    IR PORT PLACEMENT  7/26/2019    IR PORT REMOVAL  9/20/2019    IR TUBE PLACEMENT NEPHROSTOMY  7/26/2019    OOPHORECTOMY Bilateral     NE INSJ TUNNELED CTR VAD W/SUBQ PORT AGE 5 YR/> Left 11/12/2019    Procedure: INSERTION VENOUS PORT ( PORT-A-CATH) IR;  Surgeon: Kerry Ching, DO;  [facility];  Service: Interventional Radiology    NE LAP, RADICAL HYST W/ TUBE&OV, NODE BX N/A 12/19/2017    Procedure: HYSTERECTOMY LAPAROSCOPIC TOTAL ([address] [facility] [address]) W/ ROBOTICS; BILATERAL SALPINGOOPHERECTOMY; LYMPH NODE DISSECTION; lysis of adhesions;  Surgeon: Pedro Dakins, MD;  [facility];  Service: Gynecology Oncology    NE LAP,DIAGNOSTIC ABDOMEN N/A 12/19/2017    Procedure: LAPAROSCOPY DIAGNOSTIC;  Surgeon: Pedro Dakins, MD;  [facility];  Service: Gynecology Oncology    TONSILLECTOMY      US GUIDED BREAST BIOPSY RIGHT COMPLETE Right 6/28/2019        Social History     Tobacco Use   Smoking Status Never Smoker   Smokeless Tobacco Never Used        Social History     Substance and Sexual Activity   Alcohol Use Not Currently        Social History     Substance and Sexual Activity   Drug Use No        Allergies   Allergen Reactions    Cephalosporins Rash     Which Cephalosporin reaction was to not specified; however, has tolerated Amoxicillin, Cefazolin, and Cefepime       Current Outpatient Medications   Medication Sig Dispense Refill    acetaminophen (TYLENOL) 325 mg tablet Take 2 tablets (650 mg total) by mouth every 6 (six) hours as needed for mild pain, headaches or fever 30 tablet 0    ARIPiprazole (ABILIFY) 10 mg tablet Take 10 mg by mouth daily      aspirin (ECOTRIN LOW STRENGTH) 81 mg EC tablet Take 81 mg by mouth daily      cholecalciferol (VITAMIN D3) 1,000 units tablet Take 1,000 Units by mouth daily      enoxaparin (LOVENOX) 100 mg/mL Inject 0 9 mL (90 mg total) under the skin every 12 (twelve) hours 60 Syringe 3    folic acid (FOLVITE) 1 mg tablet Take 1 tablet (1 mg total) by mouth daily  0    gemfibrozil (LOPID) 600 mg tablet TAKE 1 TABLET BY MOUTH DAILY 90 tablet 1    LORazepam (ATIVAN) 1 mg tablet Take 1 tablet (1 mg total) by mouth every 6 (six) hours as needed for anxiety (or nausea) for up to 10 days 10 tablet 0    MYRBETRIQ 50 MG TB24 TAKE 1 TABLET DAILY 30 tablet 10    omeprazole (PriLOSEC) 20 mg delayed release capsule Take 20 mg by mouth daily      ondansetron (ZOFRAN) 8 mg tablet Take 1 tablet (8 mg total) by mouth every 8 (eight) hours as needed for nausea or vomiting 30 tablet 1    Pegfilgrastim (NEULASTA DELIVERY KIT SC) Inject under the skin every 21 days      quinapril (ACCUPRIL) 5 mg tablet Take 1 tablet (5 mg total) by mouth daily at bedtime 90 tablet 1    saccharomyces boulardii (FLORASTOR) 250 mg capsule Take 1 capsule (250 mg total) by mouth 2 (two) times a day  0    venlafaxine (EFFEXOR) 75 mg tablet Take 75 mg by mouth 3 (three) times a day         No current facility-administered medications for this encounter  Objective:   Physical Exam    Gen: NAD  Left chest: Port palpable, no overlying erythema, no purulent drainage  There is mild bruising around the port  Lab Results   Component Value Date    WBC 31 42 (HH) 11/21/2019    HGB 9 1 (L) 11/21/2019    HCT 28 7 (L) 11/21/2019    MCV 95 11/21/2019     11/21/2019     Lab Results   Component Value Date    INR 1 09 10/22/2019    INR 2 10 (H) 09/20/2019    INR 3 29 (H) 09/19/2019    PROTIME 13 5 10/22/2019    PROTIME 23 1 (H) 09/20/2019    PROTIME 32 9 (H) 09/19/2019     Lab Results   Component Value Date    PTT 41 (H) 10/22/2019     I have personally reviewed pertinent imaging and laboratory results  Assessment/Plan:  The left chest port does not appear to be infected  There is mild bruising which may be due to trauma from access needle  Patient will continue to monitor for signs of infection and return as needed

## 2019-11-26 NOTE — TELEPHONE ENCOUNTER
Called Lisa back and explained to her that Dr Gilford Downs had placed VNA order so that Yaritza Keyes could have extensions on her nephrostomy tubes so she could do them herself  She states that VNA does not do this  I will see about getting her extensions

## 2019-11-27 ENCOUNTER — OFFICE VISIT (OUTPATIENT)
Dept: GYNECOLOGIC ONCOLOGY | Facility: CLINIC | Age: 64
End: 2019-11-27
Payer: COMMERCIAL

## 2019-11-27 ENCOUNTER — NUTRITION (OUTPATIENT)
Dept: NUTRITION | Facility: CLINIC | Age: 64
End: 2019-11-27

## 2019-11-27 ENCOUNTER — HOSPITAL ENCOUNTER (OUTPATIENT)
Dept: INFUSION CENTER | Facility: CLINIC | Age: 64
Discharge: HOME | End: 2019-11-27
Payer: COMMERCIAL

## 2019-11-27 VITALS — BODY MASS INDEX: 39.18 KG/M2 | WEIGHT: 194 LBS

## 2019-11-27 VITALS
WEIGHT: 193.9 LBS | BODY MASS INDEX: 39.09 KG/M2 | TEMPERATURE: 98 F | SYSTOLIC BLOOD PRESSURE: 112 MMHG | DIASTOLIC BLOOD PRESSURE: 78 MMHG | HEIGHT: 59 IN | HEART RATE: 92 BPM

## 2019-11-27 DIAGNOSIS — N13.39 OTHER HYDRONEPHROSIS: ICD-10-CM

## 2019-11-27 DIAGNOSIS — I82.4Y9 ACUTE DEEP VEIN THROMBOSIS (DVT) OF PROXIMAL VEIN OF LOWER EXTREMITY, UNSPECIFIED LATERALITY (HCC): ICD-10-CM

## 2019-11-27 DIAGNOSIS — C54.1 ENDOMETRIAL CANCER (HCC): Primary | Chronic | ICD-10-CM

## 2019-11-27 DIAGNOSIS — Z71.3 NUTRITIONAL COUNSELING: Primary | ICD-10-CM

## 2019-11-27 DIAGNOSIS — D50.8 OTHER IRON DEFICIENCY ANEMIA: ICD-10-CM

## 2019-11-27 DIAGNOSIS — C54.1 ENDOMETRIAL CANCER (HCC): Primary | ICD-10-CM

## 2019-11-27 DIAGNOSIS — Z45.2 ENCOUNTER FOR CENTRAL LINE CARE: ICD-10-CM

## 2019-11-27 LAB
ALBUMIN SERPL BCP-MCNC: 3.4 G/DL (ref 3.5–5)
ALP SERPL-CCNC: 111 U/L (ref 46–116)
ALT SERPL W P-5'-P-CCNC: 15 U/L (ref 12–78)
ANION GAP SERPL CALCULATED.3IONS-SCNC: 12 MMOL/L (ref 4–13)
AST SERPL W P-5'-P-CCNC: 8 U/L (ref 5–45)
BASOPHILS # BLD MANUAL: 0 THOUSAND/UL (ref 0–0.1)
BASOPHILS NFR MAR MANUAL: 0 % (ref 0–1)
BILIRUB SERPL-MCNC: 0.2 MG/DL (ref 0.2–1)
BUN SERPL-MCNC: 14 MG/DL (ref 5–25)
CALCIUM SERPL-MCNC: 9.2 MG/DL (ref 8.3–10.1)
CHLORIDE SERPL-SCNC: 108 MMOL/L (ref 100–108)
CO2 SERPL-SCNC: 24 MMOL/L (ref 21–32)
CREAT SERPL-MCNC: 0.71 MG/DL (ref 0.6–1.3)
EOSINOPHIL # BLD MANUAL: 0 THOUSAND/UL (ref 0–0.4)
EOSINOPHIL NFR BLD MANUAL: 0 % (ref 0–6)
ERYTHROCYTE [DISTWIDTH] IN BLOOD BY AUTOMATED COUNT: 15.3 % (ref 11.6–15.1)
GFR SERPL CREATININE-BSD FRML MDRD: 92 ML/MIN/1.73SQ M
GLUCOSE SERPL-MCNC: 110 MG/DL (ref 65–140)
HCT VFR BLD AUTO: 28.3 % (ref 34.8–46.1)
HGB BLD-MCNC: 8.9 G/DL (ref 11.5–15.4)
LYMPHOCYTES # BLD AUTO: 1.59 THOUSAND/UL (ref 0.6–4.47)
LYMPHOCYTES # BLD AUTO: 19 % (ref 14–44)
MAGNESIUM SERPL-MCNC: 1.5 MG/DL (ref 1.6–2.6)
MCH RBC QN AUTO: 29.9 PG (ref 26.8–34.3)
MCHC RBC AUTO-ENTMCNC: 31.4 G/DL (ref 31.4–37.4)
MCV RBC AUTO: 95 FL (ref 82–98)
METAMYELOCYTES NFR BLD MANUAL: 1 % (ref 0–1)
MONOCYTES # BLD AUTO: 0.67 THOUSAND/UL (ref 0–1.22)
MONOCYTES NFR BLD: 8 % (ref 4–12)
MYELOCYTES NFR BLD MANUAL: 3 % (ref 0–1)
NEUTROPHILS # BLD MANUAL: 5.37 THOUSAND/UL (ref 1.85–7.62)
NEUTS BAND NFR BLD MANUAL: 3 % (ref 0–8)
NEUTS SEG NFR BLD AUTO: 61 % (ref 43–75)
NRBC BLD AUTO-RTO: 0 /100 WBCS
PLATELET # BLD AUTO: 238 THOUSANDS/UL (ref 149–390)
PLATELET BLD QL SMEAR: ADEQUATE
PMV BLD AUTO: 10.1 FL (ref 8.9–12.7)
POTASSIUM SERPL-SCNC: 3.2 MMOL/L (ref 3.5–5.3)
PROT SERPL-MCNC: 7.6 G/DL (ref 6.4–8.2)
RBC # BLD AUTO: 2.98 MILLION/UL (ref 3.81–5.12)
RBC MORPH BLD: NORMAL
SODIUM SERPL-SCNC: 144 MMOL/L (ref 136–145)
TOTAL CELLS COUNTED SPEC: 100
VARIANT LYMPHS # BLD AUTO: 5 %
WBC # BLD AUTO: 8.39 THOUSAND/UL (ref 4.31–10.16)

## 2019-11-27 PROCEDURE — 80053 COMPREHEN METABOLIC PANEL: CPT

## 2019-11-27 PROCEDURE — 85007 BL SMEAR W/DIFF WBC COUNT: CPT

## 2019-11-27 PROCEDURE — 85027 COMPLETE CBC AUTOMATED: CPT

## 2019-11-27 PROCEDURE — 99214 OFFICE O/P EST MOD 30 MIN: CPT | Performed by: OBSTETRICS & GYNECOLOGY

## 2019-11-27 PROCEDURE — 83735 ASSAY OF MAGNESIUM: CPT

## 2019-11-27 RX ORDER — ACETAMINOPHEN 325 MG/1
650 TABLET ORAL ONCE
Status: CANCELLED | OUTPATIENT
Start: 2019-12-05

## 2019-11-27 RX ORDER — SODIUM CHLORIDE 9 MG/ML
20 INJECTION, SOLUTION INTRAVENOUS ONCE
Status: CANCELLED | OUTPATIENT
Start: 2019-12-05

## 2019-11-27 RX ORDER — PALONOSETRON 0.05 MG/ML
0.25 INJECTION, SOLUTION INTRAVENOUS ONCE
Status: CANCELLED | OUTPATIENT
Start: 2019-12-02

## 2019-11-27 RX ORDER — SODIUM CHLORIDE 9 MG/ML
20 INJECTION, SOLUTION INTRAVENOUS ONCE
Status: CANCELLED | OUTPATIENT
Start: 2019-12-02

## 2019-11-27 NOTE — ASSESSMENT & PLAN NOTE
Patient has been cleared to receive cycle 5 of chemotherapy with carboplatin and Taxol at current doses  Will continue to monitor laboratory parameters prior to each infusion  Plan to obtain CT scan after cycle 6

## 2019-11-27 NOTE — PROGRESS NOTES
Assessment/Plan:    Problem List Items Addressed This Visit        Cardiovascular and Mediastinum    Acute deep vein thrombosis (DVT) of proximal vein of lower extremity (HCC)     Patient continues to use Lovenox twice a day  After completion of current available medication, she will  new prescription for Lovenox 120 mg (approx 1 5 milligram/kilogram per day) to facilitate injection make it more user friendly  She verbalized understanding  Relevant Medications    enoxaparin (LOVENOX) 120 mg/0 8 mL       Genitourinary    Endometrial cancer (HCC) - Primary (Chronic)     Patient has been cleared to receive cycle 5 of chemotherapy with carboplatin and Taxol at current doses  Will continue to monitor laboratory parameters prior to each infusion  Plan to obtain CT scan after cycle 6  Other hydronephrosis     Nephrostomy tube capped  Patient follows with Neurology  In the absence of hydronephrosis at next CT scan will consider anterograde nephrostogram/removal by Interventional Radiology  Other    Anemia     Anemia is multifactorial   Chronic disease, chemotherapy and likely component of iron deficiency  Patient's hemoglobin is less than 10 and she is symptomatic  Plan to administer 1 unit of packed red blood cells next week  CHIEF COMPLAINT:   Pre chemotherapy visit  Complains of fatigue/symptomatic anemia  DVT  Hydronephrosis follow-up  Problem:  Cancer Staging  Endometrial cancer [facility])  Staging form: Corpus Uteri - Carcinoma, AJCC 7th Edition  - Clinical stage from 12/19/2017: FIGO Stage IB (T1b, N0, M0) - Signed by Claudean Fife, MD on 2/12/2018        Previous therapy:     Endometrial cancer ([facility] [address] )    11/17/2017 Initial Diagnosis     Endometrial cancer ([facility] [address] )      12/19/2017 Surgery     Robotic assisted total laparoscopic hysterectomy with bilateral salpingo-oophorectomy and sentinel bilateral pelvic lymph node dissection   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 (4 4 x 3 2 cm tumor, 9 4/15 4mm invasion, NO LVSI, washings revealed atypical cellular changes)      12/19/2017 Genetic Testing     Morrison testing negative      7/8/2019 Recurrence     Presented with right lower extremity DVT and CT demonstrating right pelvic sidewall mass with venous, ureteral and nerve compression causing significant neuropathic pain  Core biopsy demonstrates high-grade carcinoma  7/30/2019 -  Chemotherapy     palonosetron (ALOXI) injection 0 25 mg, 0 25 mg, Intravenous, Once, 4 of 6 cycles  Administration: 0 25 mg (7/30/2019), 0 25 mg (8/19/2019), 0 25 mg (9/9/2019), 0 25 mg (11/11/2019)  pegfilgrastim (NEULASTA ONPRO) subcutaneous injection kit 6 mg, 6 mg, Subcutaneous, Once, 2 of 4 cycles  Administration: 6 mg (9/9/2019), 6 mg (11/11/2019)  fosaprepitant (EMEND) 150 mg in sodium chloride 0 9 % 255 mL IVPB, 150 mg, Intravenous, Once, 4 of 6 cycles  Administration: 150 mg (7/30/2019), 150 mg (8/19/2019), 150 mg (9/9/2019), 150 mg (11/11/2019)  CARBOplatin (PARAPLATIN) 574 2 mg in sodium chloride 0 9 % 250 mL IVPB, 574 2 mg, Intravenous, Once, 4 of 6 cycles  Administration: 574 2 mg (7/30/2019), 553 2 mg (8/19/2019), 678 6 mg (9/9/2019), 506 mg (11/11/2019)  PACLitaxel (TAXOL) 337 8 mg in sodium chloride 0 9 % 500 mL chemo IVPB, 175 mg/m2 = 337 8 mg, Intravenous, Once, 4 of 6 cycles  Dose modification: 135 mg/m2 (original dose 175 mg/m2, Cycle 4, Reason: Other (See Comments), Comment: prior hospital admission/complications)  Administration: 337 8 mg (7/30/2019), 327 mg (8/19/2019), 327 mg (9/9/2019), 252 6 mg (11/11/2019)           Patient ID: Krystle Leon is a 60 y o  female  HPI  Patient presents for follow-up  She has been tolerating chemotherapy well  Denies neuropathy  Reports fatigue which has been more significant since worsening hemoglobin levels after last cycle  Denies vaginal bleeding, drainage or discharge    Right leg/hip discomfort has virtually resolved  Nephrostomy tube is currently capped  Right lower extremity edema is mostly resolved  Uses Lovenox twice per day  Has no other complaints  The following portions of the patient's history were reviewed and updated as appropriate: allergies, current medications, past family history, past medical history, past social history, past surgical history and problem list     Review of Systems  Ambulates with a cane  Alopecia  Percutaneous nephrostomy in place  Normal bowel and bladder function  Twelve point review of systems otherwise unremarkable    Current Outpatient Medications   Medication Sig Dispense Refill    acetaminophen (TYLENOL) 325 mg tablet Take 2 tablets (650 mg total) by mouth every 6 (six) hours as needed for mild pain, headaches or fever 30 tablet 0    ARIPiprazole (ABILIFY) 10 mg tablet Take 10 mg by mouth daily      aspirin (ECOTRIN LOW STRENGTH) 81 mg EC tablet Take 81 mg by mouth daily      cholecalciferol (VITAMIN D3) 1,000 units tablet Take 1,000 Units by mouth daily      enoxaparin (LOVENOX) 100 mg/mL Inject 0 9 mL (90 mg total) under the skin every 12 (twelve) hours 60 Syringe 3    enoxaparin (LOVENOX) 120 mg/0 8 mL Inject 0 8 mL (120 mg total) under the skin daily 30 Syringe 3    folic acid (FOLVITE) 1 mg tablet Take 1 tablet (1 mg total) by mouth daily  0    gemfibrozil (LOPID) 600 mg tablet TAKE 1 TABLET BY MOUTH DAILY 90 tablet 1    LORazepam (ATIVAN) 1 mg tablet Take 1 tablet (1 mg total) by mouth every 6 (six) hours as needed for anxiety (or nausea) for up to 10 days 10 tablet 0    MYRBETRIQ 50 MG TB24 TAKE 1 TABLET DAILY 30 tablet 10    omeprazole (PriLOSEC) 20 mg delayed release capsule Take 20 mg by mouth daily      ondansetron (ZOFRAN) 8 mg tablet Take 1 tablet (8 mg total) by mouth every 8 (eight) hours as needed for nausea or vomiting 30 tablet 1    Pegfilgrastim (NEULASTA DELIVERY KIT SC) Inject under the skin every 21 days      quinapril (ACCUPRIL) 5 mg tablet Take 1 tablet (5 mg total) by mouth daily at bedtime 90 tablet 1    saccharomyces boulardii (FLORASTOR) 250 mg capsule Take 1 capsule (250 mg total) by mouth 2 (two) times a day  0    venlafaxine (EFFEXOR) 75 mg tablet Take 75 mg by mouth 3 (three) times a day         No current facility-administered medications for this visit  Objective:    Blood pressure 112/78, pulse 92, temperature 98 °F (36 7 °C), temperature source Oral, height 4' 11" (1 499 m), weight 88 kg (193 lb 14 4 oz), not currently breastfeeding  Body mass index is 39 16 kg/m²  Body surface area is 1 82 meters squared  Physical Exam   Constitutional: She is oriented to person, place, and time  She appears well-developed  No distress  HENT:   Head: Normocephalic and atraumatic  Eyes: No scleral icterus  Neck: No thyromegaly present  Left-sided Port-A-Cath in place, healthy  Minimal skin bruising after last access  Cardiovascular: Normal rate  Murmur (2/6 systolic ejection murmur) heard  Pulmonary/Chest: Effort normal and breath sounds normal  No respiratory distress  Abdominal: Soft  She exhibits no distension  There is no tenderness  Neurological: She is alert and oriented to person, place, and time         Chaya Dunlap MD, [facility]  11/27/2019  2:48 PM

## 2019-11-27 NOTE — ASSESSMENT & PLAN NOTE
Anemia is multifactorial   Chronic disease, chemotherapy and likely component of iron deficiency  Patient's hemoglobin is less than 10 and she is symptomatic  Plan to administer 1 unit of packed red blood cells next week

## 2019-11-27 NOTE — ASSESSMENT & PLAN NOTE
Patient continues to use Lovenox twice a day  After completion of current available medication, she will  new prescription for Lovenox 120 mg (approx 1 5 milligram/kilogram per day) to facilitate injection make it more user friendly  She verbalized understanding

## 2019-11-27 NOTE — ASSESSMENT & PLAN NOTE
Nephrostomy tube capped  Patient follows with Neurology  In the absence of hydronephrosis at next CT scan will consider anterograde nephrostogram/removal by Interventional Radiology

## 2019-11-27 NOTE — TELEPHONE ENCOUNTER
Called and spoke with IR at [address]  They reported they do have extension tubing available in the IR department  Called and left a detailed message for patient  Instructed her to report to the [facility] [place], first floor radiology, ask for interventional radiology and then ask for Janet Hdez  Reviewed that [facility]NA cannot come see her for just nephrostomy capping and extension tubing  Encouraged her to call back with any questions or concerns

## 2019-11-27 NOTE — PATIENT INSTRUCTIONS
Nutrition Rx & Recommendations:  · Diet: High Calorie, High Protein (for high calorie foods see pages 52-53, and for high protein foods see pages 49-51 in your Eating Hints book)  · Nutrition Supplements: - [place] Instant Breakfast or homemade shakes/smoothies as needed  May use homemade shakes/smoothies as desired  If using a pre-made shake/bar, choose ones with >300 calories and >10 grams protein (ex  Ensure Enlive, Ensure Plus, Boost Plus, Boost Very High Calorie, etc )  · Small, frequent meals/snacks may be easier to tolerate than 3 large daily meals  Aim for 5-6 small meals per day (every 2-3 hours)  · Include protein at all meals/snacks  · Stay hydrated by sipping fluids of choice/tolerance throughout the day  · Refer to Eating Hints book for other meal/snack ideas and symptom management  · Weigh yourself regularly  If you notice weight loss, make an effort to increase your daily food/calorie intake  If you continue to notice loss after these efforts, reach out to your dietitian to establish a plan to stabilize weight  · High calorie foods to try: - add butter and cheese to potatoes and vegetables; add cheese/hard boiled eggs/extra dressing to salads; eat 6 small meals each day, do not skip meals  (see pages 52-53 in your Eating Hints book)  · High protein foods to try: [place] yogurt with fruit, trail mix, oral nutrition supplements, eggs, meats, poultry, fish, cheese sticks, pudding, peanut butter, nuts, rolled up chicken/turkey/ham, hummus (see pages 49-51 in your Eating Hints book)   · Try High protein cereals such as: Kashi and Special K protein cereal (can mix these with your favorite cereals)  · Do not skip meals  Eat 6 times per day  · Weigh yourself on Monday, Wednesday, and Friday mornings    · Lifetime supplements recommended for Gastric Bypass:  · Multivitamin BID - (check to make sure if it has a total of 45 mg iron in it)  · Iron 45 mg daily  · Vitamin D daily  · Calcium Citrate 500 mg TID (do not take with iron, needs to be 2 hrs apart)  · B12 1000 mcg daily    Follow Up Plan: 1/8/20 at 10am for a phone follow up  Recommend Referral to Other Providers: none at this time

## 2019-11-27 NOTE — PLAN OF CARE
Problem: Potential for Falls  Goal: Patient will remain free of falls  Description  INTERVENTIONS:  - Assess patient frequently for physical needs  -  Identify cognitive and physical deficits and behaviors that affect risk of falls    -  [place] fall precautions as indicated by assessment   - Educate patient/family on patient safety including physical limitations  - Instruct patient to call for assistance with activity based on assessment  - Modify environment to reduce risk of injury  - Consider OT/PT consult to assist with strengthening/mobility  Outcome: Progressing

## 2019-11-27 NOTE — PROGRESS NOTES
Patient arrived for central line blood work  Offers no complaints  Left chest wall port has ecchymotic area underneath as well as hardened area of edema  No complaints of tenderness and no erythema noted  No drainage noted  Port was evaluated in IR yesterday and deemed ok to use  Port accessed without difficulty and blood work drawn off  Port flushed and aspirated easily  Patient advised to keep an eye on port site and to call MD if any more swelling, if port becomes reddenned or painful  Patient verbalized understanding   Patient verified upcoming appointment and declined AVS

## 2019-12-02 ENCOUNTER — HOSPITAL ENCOUNTER (OUTPATIENT)
Dept: INFUSION CENTER | Facility: CLINIC | Age: 64
Discharge: HOME | End: 2019-12-02
Payer: COMMERCIAL

## 2019-12-02 VITALS
SYSTOLIC BLOOD PRESSURE: 118 MMHG | HEART RATE: 70 BPM | WEIGHT: 195.5 LBS | TEMPERATURE: 97.3 F | DIASTOLIC BLOOD PRESSURE: 80 MMHG | BODY MASS INDEX: 39.41 KG/M2 | OXYGEN SATURATION: 98 % | RESPIRATION RATE: 18 BRPM | HEIGHT: 59 IN

## 2019-12-02 DIAGNOSIS — C54.1 ENDOMETRIAL CANCER (HCC): Primary | ICD-10-CM

## 2019-12-02 DIAGNOSIS — D50.8 OTHER IRON DEFICIENCY ANEMIA: ICD-10-CM

## 2019-12-02 PROCEDURE — 86900 BLOOD TYPING SEROLOGIC ABO: CPT

## 2019-12-02 PROCEDURE — 96377 APPLICATON ON-BODY INJECTOR: CPT

## 2019-12-02 PROCEDURE — 96413 CHEMO IV INFUSION 1 HR: CPT

## 2019-12-02 PROCEDURE — 96367 TX/PROPH/DG ADDL SEQ IV INF: CPT

## 2019-12-02 PROCEDURE — 96415 CHEMO IV INFUSION ADDL HR: CPT

## 2019-12-02 PROCEDURE — 86850 RBC ANTIBODY SCREEN: CPT

## 2019-12-02 PROCEDURE — 86901 BLOOD TYPING SEROLOGIC RH(D): CPT

## 2019-12-02 PROCEDURE — 96417 CHEMO IV INFUS EACH ADDL SEQ: CPT

## 2019-12-02 PROCEDURE — 96375 TX/PRO/DX INJ NEW DRUG ADDON: CPT

## 2019-12-02 RX ORDER — PALONOSETRON 0.05 MG/ML
0.25 INJECTION, SOLUTION INTRAVENOUS ONCE
Status: COMPLETED | OUTPATIENT
Start: 2019-12-02 | End: 2019-12-02

## 2019-12-02 RX ORDER — SODIUM CHLORIDE 9 MG/ML
20 INJECTION, SOLUTION INTRAVENOUS ONCE
Status: COMPLETED | OUTPATIENT
Start: 2019-12-02 | End: 2019-12-02

## 2019-12-02 RX ADMIN — PEGFILGRASTIM 6 MG: KIT SUBCUTANEOUS at 14:41

## 2019-12-02 RX ADMIN — CARBOPLATIN 543.5 MG: 10 INJECTION, SOLUTION INTRAVENOUS at 13:26

## 2019-12-02 RX ADMIN — PALONOSETRON 0.25 MG: 0.05 INJECTION, SOLUTION INTRAVENOUS at 08:34

## 2019-12-02 RX ADMIN — SODIUM CHLORIDE 20 ML/HR: 0.9 INJECTION, SOLUTION INTRAVENOUS at 08:34

## 2019-12-02 RX ADMIN — DIPHENHYDRAMINE HYDROCHLORIDE 25 MG: 50 INJECTION, SOLUTION INTRAMUSCULAR; INTRAVENOUS at 08:59

## 2019-12-02 RX ADMIN — SODIUM CHLORIDE 150 MG: 0.9 INJECTION, SOLUTION INTRAVENOUS at 09:46

## 2019-12-02 RX ADMIN — FAMOTIDINE 20 MG: 10 INJECTION INTRAVENOUS at 09:21

## 2019-12-02 RX ADMIN — PACLITAXEL 246 MG: 6 INJECTION, SOLUTION INTRAVENOUS at 10:26

## 2019-12-02 RX ADMIN — DEXAMETHASONE SODIUM PHOSPHATE 20 MG: 10 INJECTION, SOLUTION INTRAMUSCULAR; INTRAVENOUS at 08:37

## 2019-12-02 NOTE — PROGRESS NOTES
Pt here for chemotherapy and 1 unit PRBC, offers no complaints, resting comfortably  Vitals stable  Labs reviewed-ok to treat per order in [place]  Hgb-8 9-symptomatic per provider office visit note  Port was checked by IR on 11/26  Bruising around port, appears to have a hematoma, accessed without difficulty   Call bell in reach, will continue to monitor

## 2019-12-02 NOTE — LETTER
[facility] [place] [facility]  [address] [facility] [address]  Dept: [phone]    December 4, 2019     Patient: Voncille Saint   YOB: 1955   Date of Visit: 12/2/2019       To Whom it May Concern:    Voncille Saint is under my professional care  She was seen in the hospital from 12/2/2019   to 12/04/19  She {Return to school/sport/work:32048}  If you have any questions or concerns, please don't hesitate to call           Sincerely,          Nikita South MA

## 2019-12-02 NOTE — PROGRESS NOTES
Pt tolerated treatment well without any adverse reactions  Neulasta OnPro placed on R arm-green light flashing upon discharge  Pt aware of when to remove device at home  Pt handout in packaging given to patient  Aware of next appointments  AVS provided  Angie Medel

## 2019-12-02 NOTE — PROGRESS NOTES
Pt is concerned about weather today, requested to get blood transfusion another day  Coordinated with [facility] infusion, pt will receive 1 unit PRBC on Thursday 12/5  Confirmed with Blood Bank from  that they will send T&S to \[facility]\"" for processing  Pt will not need repeat T&S  Confirmed with Johnny Warren PA-C that pt is still to get labs drawn on Thursday from infusion and will receive 1 Unit PRBC regardless of HGB  Pt made aware, very appreciative

## 2019-12-04 DIAGNOSIS — D50.8 OTHER IRON DEFICIENCY ANEMIA: Primary | ICD-10-CM

## 2019-12-04 RX ORDER — SODIUM CHLORIDE 9 MG/ML
20 INJECTION, SOLUTION INTRAVENOUS ONCE
Status: CANCELLED | OUTPATIENT
Start: 2019-12-04

## 2019-12-04 RX ORDER — ACETAMINOPHEN 325 MG/1
650 TABLET ORAL ONCE
Status: CANCELLED | OUTPATIENT
Start: 2019-12-04

## 2019-12-05 ENCOUNTER — HOSPITAL ENCOUNTER (OUTPATIENT)
Dept: INFUSION CENTER | Facility: CLINIC | Age: 64
End: 2019-12-05

## 2019-12-05 ENCOUNTER — DOCUMENTATION (OUTPATIENT)
Dept: OTHER | Facility: HOSPITAL | Age: 64
End: 2019-12-05

## 2019-12-05 ENCOUNTER — HOSPITAL ENCOUNTER (OUTPATIENT)
Dept: INFUSION CENTER | Facility: HOSPITAL | Age: 64
Discharge: HOME | End: 2019-12-05
Payer: COMMERCIAL

## 2019-12-05 VITALS
DIASTOLIC BLOOD PRESSURE: 58 MMHG | SYSTOLIC BLOOD PRESSURE: 122 MMHG | RESPIRATION RATE: 18 BRPM | TEMPERATURE: 97.8 F | HEART RATE: 99 BPM

## 2019-12-05 DIAGNOSIS — C54.1 ENDOMETRIAL CANCER (HCC): ICD-10-CM

## 2019-12-05 DIAGNOSIS — D50.8 OTHER IRON DEFICIENCY ANEMIA: Primary | ICD-10-CM

## 2019-12-05 LAB
ABO GROUP BLD: NORMAL
ALBUMIN SERPL BCP-MCNC: 3.3 G/DL (ref 3.5–5)
ALP SERPL-CCNC: 117 U/L (ref 46–116)
ALT SERPL W P-5'-P-CCNC: 16 U/L (ref 12–78)
ANION GAP SERPL CALCULATED.3IONS-SCNC: 7 MMOL/L (ref 4–13)
ANISOCYTOSIS BLD QL SMEAR: PRESENT
AST SERPL W P-5'-P-CCNC: 12 U/L (ref 5–45)
BASOPHILS # BLD MANUAL: 0 THOUSAND/UL (ref 0–0.1)
BASOPHILS NFR MAR MANUAL: 0 % (ref 0–1)
BILIRUB SERPL-MCNC: 0.6 MG/DL (ref 0.2–1)
BLD GP AB SCN SERPL QL: NEGATIVE
BUN SERPL-MCNC: 12 MG/DL (ref 5–25)
CALCIUM SERPL-MCNC: 9.2 MG/DL (ref 8.3–10.1)
CHLORIDE SERPL-SCNC: 105 MMOL/L (ref 100–108)
CO2 SERPL-SCNC: 26 MMOL/L (ref 21–32)
CREAT SERPL-MCNC: 0.63 MG/DL (ref 0.6–1.3)
EOSINOPHIL # BLD MANUAL: 0 THOUSAND/UL (ref 0–0.4)
EOSINOPHIL NFR BLD MANUAL: 0 % (ref 0–6)
ERYTHROCYTE [DISTWIDTH] IN BLOOD BY AUTOMATED COUNT: 15.2 % (ref 11.6–15.1)
GFR SERPL CREATININE-BSD FRML MDRD: 96 ML/MIN/1.73SQ M
GLUCOSE SERPL-MCNC: 103 MG/DL (ref 65–140)
HCT VFR BLD AUTO: 29.1 % (ref 34.8–46.1)
HGB BLD-MCNC: 9.4 G/DL (ref 11.5–15.4)
LYMPHOCYTES # BLD AUTO: 0.31 THOUSAND/UL (ref 0.6–4.47)
LYMPHOCYTES # BLD AUTO: 1 % (ref 14–44)
MAGNESIUM SERPL-MCNC: 1.6 MG/DL (ref 1.6–2.6)
MCH RBC QN AUTO: 30.1 PG (ref 26.8–34.3)
MCHC RBC AUTO-ENTMCNC: 32.3 G/DL (ref 31.4–37.4)
MCV RBC AUTO: 93 FL (ref 82–98)
MONOCYTES # BLD AUTO: 0.31 THOUSAND/UL (ref 0–1.22)
MONOCYTES NFR BLD: 1 % (ref 4–12)
NEUTROPHILS # BLD MANUAL: 30.64 THOUSAND/UL (ref 1.85–7.62)
NEUTS BAND NFR BLD MANUAL: 3 % (ref 0–8)
NEUTS SEG NFR BLD AUTO: 95 % (ref 43–75)
NRBC BLD AUTO-RTO: 0 /100 WBCS
PLATELET # BLD AUTO: 145 THOUSANDS/UL (ref 149–390)
PLATELET BLD QL SMEAR: ADEQUATE
PMV BLD AUTO: 11.2 FL (ref 8.9–12.7)
POLYCHROMASIA BLD QL SMEAR: PRESENT
POTASSIUM SERPL-SCNC: 2.7 MMOL/L (ref 3.5–5.3)
PROT SERPL-MCNC: 7 G/DL (ref 6.4–8.2)
RBC # BLD AUTO: 3.12 MILLION/UL (ref 3.81–5.12)
RBC MORPH BLD: PRESENT
RH BLD: POSITIVE
SODIUM SERPL-SCNC: 138 MMOL/L (ref 136–145)
SPECIMEN EXPIRATION DATE: NORMAL
WBC # BLD AUTO: 31.27 THOUSAND/UL (ref 4.31–10.16)

## 2019-12-05 PROCEDURE — 80053 COMPREHEN METABOLIC PANEL: CPT

## 2019-12-05 PROCEDURE — 85007 BL SMEAR W/DIFF WBC COUNT: CPT

## 2019-12-05 PROCEDURE — P9016 RBC LEUKOCYTES REDUCED: HCPCS

## 2019-12-05 PROCEDURE — 85027 COMPLETE CBC AUTOMATED: CPT

## 2019-12-05 PROCEDURE — 86923 COMPATIBILITY TEST ELECTRIC: CPT

## 2019-12-05 PROCEDURE — 36430 TRANSFUSION BLD/BLD COMPNT: CPT

## 2019-12-05 PROCEDURE — 83735 ASSAY OF MAGNESIUM: CPT

## 2019-12-05 RX ORDER — SODIUM CHLORIDE 9 MG/ML
20 INJECTION, SOLUTION INTRAVENOUS ONCE
Status: COMPLETED | OUTPATIENT
Start: 2019-12-05 | End: 2019-12-05

## 2019-12-05 RX ORDER — ACETAMINOPHEN 325 MG/1
650 TABLET ORAL ONCE
Status: COMPLETED | OUTPATIENT
Start: 2019-12-05 | End: 2019-12-05

## 2019-12-05 RX ADMIN — SODIUM CHLORIDE 20 ML/HR: 0.9 INJECTION, SOLUTION INTRAVENOUS at 12:20

## 2019-12-05 RX ADMIN — ACETAMINOPHEN 650 MG: 325 TABLET ORAL at 12:15

## 2019-12-05 NOTE — PROGRESS NOTES
IR called for bruising/old blood from left chemo port site 12/5/19  Port was placed 11/12/19  She was seen by Dr Mary Ruvalcaba 11/26/19 for site check which was noted to have small area of ecchymosis  Per nursing, after first attempt to access port, milked bruising site with extraction of dark blood  Second attempt port accessed and patient currently receiving blood transfusion without issues  Patient stating she does not have discomfort to port site  Does note bruising site has increased in size since access 12/2/19 for chemo  Please have patient contact IR if she notices increasing of pain, bleeding from site, erythema, increase to ecchymosis       Andrew Davidson PA-C

## 2019-12-05 NOTE — PROGRESS NOTES
Pt here for 1 unit PRBC, port area is very ecchymotic which pt states has been there, not painful  Stated that she went to IR on 11/26 and they said port was fine  Pt has a large hematoma over port area, when initial attempt made to access, large amount of blood removed from area and able to successfully re-access, with good blood return from port  Pt was most recently accessed on Monday, 12/2 for chemo  Called [facility] to make aware, suggested I call IR to have them evaluate  Called and spoke to John in IR, explained the above  She will send someone to infusion center to evaluate    They are aware that pt is currently accessed and will be receiving blood

## 2019-12-05 NOTE — PLAN OF CARE
Problem: Potential for Falls  Goal: Patient will remain free of falls  Description  INTERVENTIONS:  - Assess patient frequently for physical needs  -  Identify cognitive and physical deficits and behaviors that affect risk of falls    -  [place] fall precautions as indicated by assessment   - Educate patient/family on patient safety including physical limitations  - Instruct patient to call for assistance with activity based on assessment  - Modify environment to reduce risk of injury  - Consider OT/PT consult to assist with strengthening/mobility  Outcome: Progressing

## 2019-12-05 NOTE — PROGRESS NOTES
Pt here for 1 unit PRBC, blood bank called to question previous transfusions being Leucoreduced and Irradiated  Per Johnny Warren, these requirements are no longer needed  Spoke with Gallo Orozco in blood bank

## 2019-12-06 DIAGNOSIS — E87.6 HYPOKALEMIA: Primary | ICD-10-CM

## 2019-12-06 RX ORDER — POTASSIUM CHLORIDE 1.5 G/1.77G
POWDER, FOR SOLUTION ORAL
Qty: 30 PACKET | Refills: 1 | Status: SHIPPED | OUTPATIENT
Start: 2019-12-06 | End: 2020-01-02

## 2019-12-09 ENCOUNTER — TELEPHONE (OUTPATIENT)
Dept: UROLOGY | Facility: AMBULATORY SURGERY CENTER | Age: 64
End: 2019-12-09

## 2019-12-09 ENCOUNTER — APPOINTMENT (OUTPATIENT)
Dept: LAB | Facility: MEDICAL CENTER | Age: 64
End: 2019-12-09
Payer: COMMERCIAL

## 2019-12-09 DIAGNOSIS — R39.15 URINARY URGENCY: ICD-10-CM

## 2019-12-09 DIAGNOSIS — R30.0 DYSURIA: ICD-10-CM

## 2019-12-09 DIAGNOSIS — R35.0 URINARY FREQUENCY: ICD-10-CM

## 2019-12-09 DIAGNOSIS — R30.0 DYSURIA: Primary | ICD-10-CM

## 2019-12-09 LAB
ALBUMIN SERPL BCP-MCNC: 4 G/DL (ref 3.5–5)
ALP SERPL-CCNC: 145 U/L (ref 46–116)
ALT SERPL W P-5'-P-CCNC: 21 U/L (ref 12–78)
ANION GAP SERPL CALCULATED.3IONS-SCNC: 5 MMOL/L (ref 4–13)
ANISOCYTOSIS BLD QL SMEAR: PRESENT
AST SERPL W P-5'-P-CCNC: 10 U/L (ref 5–45)
BASOPHILS # BLD MANUAL: 0.17 THOUSAND/UL (ref 0–0.1)
BASOPHILS NFR MAR MANUAL: 2 % (ref 0–1)
BILIRUB SERPL-MCNC: 0.24 MG/DL (ref 0.2–1)
BUN SERPL-MCNC: 14 MG/DL (ref 5–25)
CALCIUM ALBUM COR SERPL-MCNC: 10.2 MG/DL (ref 8.3–10.1)
CALCIUM SERPL-MCNC: 10.2 MG/DL (ref 8.3–10.1)
CHLORIDE SERPL-SCNC: 112 MMOL/L (ref 100–108)
CO2 SERPL-SCNC: 24 MMOL/L (ref 21–32)
CREAT SERPL-MCNC: 0.88 MG/DL (ref 0.6–1.3)
CRP SERPL QL: 71.5 MG/L
EOSINOPHIL # BLD MANUAL: 0.08 THOUSAND/UL (ref 0–0.4)
EOSINOPHIL NFR BLD MANUAL: 1 % (ref 0–6)
ERYTHROCYTE [DISTWIDTH] IN BLOOD BY AUTOMATED COUNT: 14.6 % (ref 11.6–15.1)
GFR SERPL CREATININE-BSD FRML MDRD: 71 ML/MIN/1.73SQ M
GLUCOSE SERPL-MCNC: 112 MG/DL (ref 65–140)
HCT VFR BLD AUTO: 31.9 % (ref 34.8–46.1)
HGB BLD-MCNC: 9.6 G/DL (ref 11.5–15.4)
LYMPHOCYTES # BLD AUTO: 1.92 THOUSAND/UL (ref 0.6–4.47)
LYMPHOCYTES # BLD AUTO: 23 % (ref 14–44)
MACROCYTES BLD QL AUTO: PRESENT
MAGNESIUM SERPL-MCNC: 1.8 MG/DL (ref 1.6–2.6)
MCH RBC QN AUTO: 29.4 PG (ref 26.8–34.3)
MCHC RBC AUTO-ENTMCNC: 30.1 G/DL (ref 31.4–37.4)
MCV RBC AUTO: 98 FL (ref 82–98)
MONOCYTES # BLD AUTO: 0.42 THOUSAND/UL (ref 0–1.22)
MONOCYTES NFR BLD: 5 % (ref 4–12)
NEUTROPHILS # BLD MANUAL: 5.59 THOUSAND/UL (ref 1.85–7.62)
NEUTS BAND NFR BLD MANUAL: 1 % (ref 0–8)
NEUTS SEG NFR BLD AUTO: 66 % (ref 43–75)
NRBC BLD AUTO-RTO: 0 /100 WBCS
PLATELET # BLD AUTO: 150 THOUSANDS/UL (ref 149–390)
PLATELET BLD QL SMEAR: ADEQUATE
PMV BLD AUTO: 13 FL (ref 8.9–12.7)
POIKILOCYTOSIS BLD QL SMEAR: PRESENT
POTASSIUM SERPL-SCNC: 5.1 MMOL/L (ref 3.5–5.3)
PROT SERPL-MCNC: 8 G/DL (ref 6.4–8.2)
RBC # BLD AUTO: 3.27 MILLION/UL (ref 3.81–5.12)
RBC MORPH BLD: PRESENT
SODIUM SERPL-SCNC: 141 MMOL/L (ref 136–145)
TOXIC GRANULES BLD QL SMEAR: PRESENT
VARIANT LYMPHS # BLD AUTO: 2 %
WBC # BLD AUTO: 8.35 THOUSAND/UL (ref 4.31–10.16)

## 2019-12-09 PROCEDURE — 85007 BL SMEAR W/DIFF WBC COUNT: CPT | Performed by: NURSE PRACTITIONER

## 2019-12-09 PROCEDURE — 83735 ASSAY OF MAGNESIUM: CPT | Performed by: NURSE PRACTITIONER

## 2019-12-09 PROCEDURE — 80053 COMPREHEN METABOLIC PANEL: CPT | Performed by: NURSE PRACTITIONER

## 2019-12-09 PROCEDURE — 86140 C-REACTIVE PROTEIN: CPT

## 2019-12-09 PROCEDURE — 36415 COLL VENOUS BLD VENIPUNCTURE: CPT | Performed by: NURSE PRACTITIONER

## 2019-12-09 PROCEDURE — 85027 COMPLETE CBC AUTOMATED: CPT | Performed by: NURSE PRACTITIONER

## 2019-12-09 NOTE — TELEPHONE ENCOUNTER
Patient is aware for need of urine studies to be done  She will go to an outpatient [facility] tomorrow, 12/10/19  Orders in [address] [place] [address] Rd

## 2019-12-09 NOTE — TELEPHONE ENCOUNTER
Patient managed by Dr Juanita Cha is calling to say on Saturday she began urinary symptoms of infection

## 2019-12-10 ENCOUNTER — TRANSCRIBE ORDERS (OUTPATIENT)
Dept: ADMINISTRATIVE | Age: 64
End: 2019-12-10

## 2019-12-10 ENCOUNTER — APPOINTMENT (OUTPATIENT)
Dept: LAB | Age: 64
End: 2019-12-10
Payer: COMMERCIAL

## 2019-12-10 DIAGNOSIS — R39.15 URGENCY OF URINATION: ICD-10-CM

## 2019-12-10 DIAGNOSIS — R35.0 URINARY FREQUENCY: ICD-10-CM

## 2019-12-10 DIAGNOSIS — R30.0 DYSURIA: ICD-10-CM

## 2019-12-10 DIAGNOSIS — R39.15 URGENCY OF URINATION: Primary | ICD-10-CM

## 2019-12-10 DIAGNOSIS — R30.0 DYSURIA: Primary | ICD-10-CM

## 2019-12-10 LAB
BACTERIA UR QL AUTO: ABNORMAL /HPF
BILIRUB UR QL STRIP: NEGATIVE
CLARITY UR: ABNORMAL
COLOR UR: YELLOW
GLUCOSE UR STRIP-MCNC: NEGATIVE MG/DL
HGB UR QL STRIP.AUTO: ABNORMAL
KETONES UR STRIP-MCNC: NEGATIVE MG/DL
LEUKOCYTE ESTERASE UR QL STRIP: ABNORMAL
NITRITE UR QL STRIP: NEGATIVE
NON-SQ EPI CELLS URNS QL MICRO: ABNORMAL /HPF
OTHER STN SPEC: ABNORMAL
PH UR STRIP.AUTO: 6 [PH]
PROT UR STRIP-MCNC: ABNORMAL MG/DL
RBC #/AREA URNS AUTO: ABNORMAL /HPF
SP GR UR STRIP.AUTO: 1.01 (ref 1–1.03)
UROBILINOGEN UR QL STRIP.AUTO: 0.2 E.U./DL
WBC #/AREA URNS AUTO: ABNORMAL /HPF

## 2019-12-10 PROCEDURE — 81001 URINALYSIS AUTO W/SCOPE: CPT

## 2019-12-10 PROCEDURE — 87086 URINE CULTURE/COLONY COUNT: CPT

## 2019-12-11 LAB — BACTERIA UR CULT: NORMAL

## 2019-12-12 NOTE — TELEPHONE ENCOUNTER
Call placed to patient to inform of culture results  Encouraged patient to increase her water intake  Patient did state "I have been drinking a lot more water and seem to be feeling better " Did advise patient to call the office with any concerns

## 2019-12-12 NOTE — TELEPHONE ENCOUNTER
Urine culture showed no evidence of infection, mixed contaminants noted  Encourage patient to increase water intake

## 2019-12-14 DIAGNOSIS — R39.15 URINARY URGENCY: ICD-10-CM

## 2019-12-18 ENCOUNTER — HOSPITAL ENCOUNTER (OUTPATIENT)
Dept: INFUSION CENTER | Facility: CLINIC | Age: 64
Discharge: HOME | End: 2019-12-18
Payer: COMMERCIAL

## 2019-12-18 DIAGNOSIS — C54.1 ENDOMETRIAL CANCER (HCC): ICD-10-CM

## 2019-12-18 DIAGNOSIS — Z45.2 ENCOUNTER FOR CENTRAL LINE CARE: Primary | ICD-10-CM

## 2019-12-18 LAB
ALBUMIN SERPL BCP-MCNC: 3.6 G/DL (ref 3.5–5)
ALP SERPL-CCNC: 135 U/L (ref 46–116)
ALT SERPL W P-5'-P-CCNC: 16 U/L (ref 12–78)
ANION GAP SERPL CALCULATED.3IONS-SCNC: 12 MMOL/L (ref 4–13)
AST SERPL W P-5'-P-CCNC: 7 U/L (ref 5–45)
BASOPHILS # BLD AUTO: 0.02 THOUSANDS/ΜL (ref 0–0.1)
BASOPHILS NFR BLD AUTO: 0 % (ref 0–1)
BILIRUB SERPL-MCNC: 0.23 MG/DL (ref 0.2–1)
BUN SERPL-MCNC: 29 MG/DL (ref 5–25)
CALCIUM SERPL-MCNC: 10.1 MG/DL (ref 8.3–10.1)
CHLORIDE SERPL-SCNC: 102 MMOL/L (ref 100–108)
CO2 SERPL-SCNC: 24 MMOL/L (ref 21–32)
CREAT SERPL-MCNC: 1.12 MG/DL (ref 0.6–1.3)
EOSINOPHIL # BLD AUTO: 0.01 THOUSAND/ΜL (ref 0–0.61)
EOSINOPHIL NFR BLD AUTO: 0 % (ref 0–6)
ERYTHROCYTE [DISTWIDTH] IN BLOOD BY AUTOMATED COUNT: 14.2 % (ref 11.6–15.1)
GFR SERPL CREATININE-BSD FRML MDRD: 53 ML/MIN/1.73SQ M
GLUCOSE SERPL-MCNC: 116 MG/DL (ref 65–140)
HCT VFR BLD AUTO: 31.6 % (ref 34.8–46.1)
HGB BLD-MCNC: 10.2 G/DL (ref 11.5–15.4)
IMM GRANULOCYTES # BLD AUTO: 0.1 THOUSAND/UL (ref 0–0.2)
IMM GRANULOCYTES NFR BLD AUTO: 1 % (ref 0–2)
LYMPHOCYTES # BLD AUTO: 1.44 THOUSANDS/ΜL (ref 0.6–4.47)
LYMPHOCYTES NFR BLD AUTO: 14 % (ref 14–44)
MAGNESIUM SERPL-MCNC: 1.7 MG/DL (ref 1.6–2.6)
MCH RBC QN AUTO: 30.3 PG (ref 26.8–34.3)
MCHC RBC AUTO-ENTMCNC: 32.3 G/DL (ref 31.4–37.4)
MCV RBC AUTO: 94 FL (ref 82–98)
MONOCYTES # BLD AUTO: 0.62 THOUSAND/ΜL (ref 0.17–1.22)
MONOCYTES NFR BLD AUTO: 6 % (ref 4–12)
NEUTROPHILS # BLD AUTO: 8.36 THOUSANDS/ΜL (ref 1.85–7.62)
NEUTS SEG NFR BLD AUTO: 79 % (ref 43–75)
NRBC BLD AUTO-RTO: 0 /100 WBCS
PLATELET # BLD AUTO: 183 THOUSANDS/UL (ref 149–390)
PMV BLD AUTO: 9.5 FL (ref 8.9–12.7)
POTASSIUM SERPL-SCNC: 4 MMOL/L (ref 3.5–5.3)
PROT SERPL-MCNC: 8 G/DL (ref 6.4–8.2)
RBC # BLD AUTO: 3.37 MILLION/UL (ref 3.81–5.12)
SODIUM SERPL-SCNC: 138 MMOL/L (ref 136–145)
WBC # BLD AUTO: 10.55 THOUSAND/UL (ref 4.31–10.16)

## 2019-12-18 PROCEDURE — 85025 COMPLETE CBC W/AUTO DIFF WBC: CPT

## 2019-12-18 PROCEDURE — 80053 COMPREHEN METABOLIC PANEL: CPT

## 2019-12-18 PROCEDURE — 83735 ASSAY OF MAGNESIUM: CPT

## 2019-12-19 ENCOUNTER — OFFICE VISIT (OUTPATIENT)
Dept: GYNECOLOGIC ONCOLOGY | Facility: CLINIC | Age: 64
End: 2019-12-19
Payer: COMMERCIAL

## 2019-12-19 VITALS
HEART RATE: 91 BPM | HEIGHT: 59 IN | SYSTOLIC BLOOD PRESSURE: 142 MMHG | DIASTOLIC BLOOD PRESSURE: 88 MMHG | BODY MASS INDEX: 36.39 KG/M2 | WEIGHT: 180.5 LBS | TEMPERATURE: 97.6 F

## 2019-12-19 DIAGNOSIS — C54.1 ENDOMETRIAL CANCER (HCC): Primary | Chronic | ICD-10-CM

## 2019-12-19 DIAGNOSIS — I82.4Y9 ACUTE DEEP VEIN THROMBOSIS (DVT) OF PROXIMAL VEIN OF LOWER EXTREMITY, UNSPECIFIED LATERALITY (HCC): ICD-10-CM

## 2019-12-19 PROCEDURE — 99214 OFFICE O/P EST MOD 30 MIN: CPT | Performed by: PHYSICIAN ASSISTANT

## 2019-12-19 RX ORDER — SODIUM CHLORIDE 9 MG/ML
20 INJECTION, SOLUTION INTRAVENOUS ONCE
Status: CANCELLED | OUTPATIENT
Start: 2019-12-23

## 2019-12-19 RX ORDER — PALONOSETRON 0.05 MG/ML
0.25 INJECTION, SOLUTION INTRAVENOUS ONCE
Status: CANCELLED | OUTPATIENT
Start: 2019-12-23

## 2019-12-19 NOTE — ASSESSMENT & PLAN NOTE
Recurrent, stage IB, grade 1 endometrial cancer with a pelvic side wall recurrence  She is currently receiving taxol 135 mg/m2 and carboplatin AUC 5 every 21 days  She has grade 2 treatment related fatigue and decreased appetite  Otherwise, she is tolerating treatment well  Continue with cycle 6 of treatment as scheduled  Her metabolic and hematologic values from 12/18/19 were reviewed, and are adequate for treatment  Plan for CT imaging after completion of cycle 6  Return to the office as per her chemotherapy calendar

## 2019-12-19 NOTE — PROGRESS NOTES
Assessment/Plan:    Problem List Items Addressed This Visit        Cardiovascular and Mediastinum    Acute deep vein thrombosis (DVT) of proximal vein of lower extremity (HCC)     Anticoagulated on lovenox 120 mg daily (1 5 mg/kg/day)  Genitourinary    Endometrial cancer ([facility] ) - Primary (Chronic)     Recurrent, stage IB, grade 1 endometrial cancer with a pelvic side wall recurrence  She is currently receiving taxol 135 mg/m2 and carboplatin AUC 5 every 21 days  She has grade 2 treatment related fatigue and decreased appetite  Otherwise, she is tolerating treatment well  Continue with cycle 6 of treatment as scheduled  Her metabolic and hematologic values from 12/18/19 were reviewed, and are adequate for treatment  Plan for CT imaging after completion of cycle 6  Return to the office as per her chemotherapy calendar  Relevant Orders    CT chest abdomen pelvis w contrast            CHIEF COMPLAINT:   Pre-chemotherapy evaluation    Problem:  Cancer Staging  Endometrial cancer ([facility] ), Recurrent   Staging form: Corpus Uteri - Carcinoma, AJCC 7th Edition  - Clinical stage from 12/19/2017: FIGO Stage IB (T1b, N0, M0) - Signed by Mykel Johns MD on 2/12/2018        Previous therapy:     Endometrial cancer ([facility] )    11/17/2017 Initial Diagnosis     Endometrial cancer ([facility] )      12/19/2017 Surgery     Robotic assisted total laparoscopic hysterectomy with bilateral salpingo-oophorectomy and sentinel bilateral pelvic lymph node dissection  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 (4 4 x 3 2 cm tumor, 9 4/15 4mm invasion, NO LVSI, washings revealed atypical cellular changes)      12/19/2017 Genetic Testing     Morrison testing negative      7/8/2019 Recurrence     Presented with right lower extremity DVT and CT demonstrating right pelvic sidewall mass with venous, ureteral and nerve compression causing significant neuropathic pain  Core biopsy demonstrates high-grade carcinoma  Chemotherapy     Taxol 175 mg/m2 and carboplatin AUC 6 every 21 days  Dose was reduced to taxol 135 mg/m2 and carboplatin AUC 5 with cycle 4  She is scheduled for cycle 6 of treatment  Patient ID: Neno Scales is a 60 y o  female  who presents to the office for pre-chemotherapy evaluation  Overall, she is tolerating treatment well  She has been afebrile  The patient is fatigued  She denies vomiting  Her nausea is controlled  The patient notes her appetite is reduced  She has intermittent constipation, and uses colace  The patient denies chemotherapy-induced neuropathy  Her labs from 12/18/19 were reviewed  The following portions of the patient's history were reviewed and updated as appropriate: allergies, current medications, past medical history and problem list     Review of Systems   Constitutional: Positive for appetite change and fatigue  Negative for fever  HENT: Negative  Eyes: Negative  Respiratory: Negative  Cardiovascular: Negative  Gastrointestinal: Positive for constipation  Negative for abdominal pain  Genitourinary: Negative  Musculoskeletal: Negative  Skin: Negative  Neurological: Negative  Negative for numbness  Psychiatric/Behavioral: Negative          Current Outpatient Medications   Medication Sig Dispense Refill    acetaminophen (TYLENOL) 325 mg tablet Take 2 tablets (650 mg total) by mouth every 6 (six) hours as needed for mild pain, headaches or fever 30 tablet 0    ARIPiprazole (ABILIFY) 10 mg tablet Take 10 mg by mouth daily      aspirin (ECOTRIN LOW STRENGTH) 81 mg EC tablet Take 81 mg by mouth daily      cholecalciferol (VITAMIN D3) 1,000 units tablet Take 1,000 Units by mouth daily      enoxaparin (LOVENOX) 120 mg/0 8 mL Inject 0 8 mL (120 mg total) under the skin daily 30 Syringe 3    folic acid (FOLVITE) 1 mg tablet Take 1 tablet (1 mg total) by mouth daily  0    gemfibrozil (LOPID) 600 mg tablet TAKE 1 TABLET BY MOUTH DAILY 90 tablet 1    MYRBETRIQ 50 MG TB24 TAKE 1 TABLET DAILY 30 tablet 10    omeprazole (PriLOSEC) 20 mg delayed release capsule Take 20 mg by mouth daily      ondansetron (ZOFRAN) 8 mg tablet Take 1 tablet (8 mg total) by mouth every 8 (eight) hours as needed for nausea or vomiting 30 tablet 1    Pegfilgrastim (NEULASTA DELIVERY KIT SC) Inject under the skin every 21 days      quinapril (ACCUPRIL) 5 mg tablet Take 1 tablet (5 mg total) by mouth daily at bedtime 90 tablet 1    saccharomyces boulardii (FLORASTOR) 250 mg capsule Take 1 capsule (250 mg total) by mouth 2 (two) times a day  0    venlafaxine (EFFEXOR) 75 mg tablet Take 75 mg by mouth 3 (three) times a day        enoxaparin (LOVENOX) 100 mg/mL Inject 0 9 mL (90 mg total) under the skin every 12 (twelve) hours 60 Syringe 3    LORazepam (ATIVAN) 1 mg tablet Take 1 tablet (1 mg total) by mouth every 6 (six) hours as needed for anxiety (or nausea) for up to 10 days 10 tablet 0    potassium chloride (KLOR-CON) 20 mEq packet Take 2 packets PO TID x 3 days, then 2 packets PO BID x 3 days (Patient not taking: Reported on 12/19/2019) 30 packet 1     No current facility-administered medications for this visit  Objective:    Blood pressure 142/88, pulse 91, temperature 97 6 °F (36 4 °C), temperature source Oral, height 4' 11" (1 499 m), weight 81 9 kg (180 lb 8 oz), not currently breastfeeding  Body mass index is 36 46 kg/m²  Body surface area is 1 77 meters squared  Physical Exam   Constitutional: She is oriented to person, place, and time  She appears well-developed and well-nourished  Pulmonary/Chest: Effort normal    Neurological: She is alert and oriented to person, place, and time  Skin: Skin is warm and dry  No rash noted  Psychiatric: She has a normal mood and affect  Her behavior is normal    Performance status is one        No results found for:   Lab Results   Component Value Date     10/27/2017    K 4 0 12/18/2019    CL 102 12/18/2019    CO2 24 12/18/2019    BUN 29 (H) 12/18/2019    CREATININE 1 12 12/18/2019    GLUCOSE 219 (H) 12/19/2017    GLUF 90 11/14/2019    CALCIUM 10 1 12/18/2019    CORRECTEDCA 10 2 (H) 12/09/2019    AST 7 12/18/2019    ALT 16 12/18/2019    ALKPHOS 135 (H) 12/18/2019    PROT 6 9 10/27/2017    BILITOT 0 3 10/27/2017    EGFR 53 12/18/2019     Lab Results   Component Value Date    WBC 10 55 (H) 12/18/2019    HGB 10 2 (L) 12/18/2019    HCT 31 6 (L) 12/18/2019    MCV 94 12/18/2019     12/18/2019     Lab Results   Component Value Date    NEUTROABS 8 36 (H) 12/18/2019

## 2019-12-22 ENCOUNTER — HOSPITAL ENCOUNTER (INPATIENT)
Facility: HOSPITAL | Age: 64
LOS: 3 days | Discharge: HOME | DRG: 872 | End: 2019-12-25
Attending: EMERGENCY MEDICINE | Admitting: INTERNAL MEDICINE
Payer: COMMERCIAL

## 2019-12-22 ENCOUNTER — APPOINTMENT (EMERGENCY)
Dept: RADIOLOGY | Facility: HOSPITAL | Age: 64
DRG: 872 | End: 2019-12-22
Payer: COMMERCIAL

## 2019-12-22 DIAGNOSIS — N39.0 SEPSIS SECONDARY TO UTI (HCC): ICD-10-CM

## 2019-12-22 DIAGNOSIS — R50.9 FEVER: Primary | ICD-10-CM

## 2019-12-22 DIAGNOSIS — A41.9 SEPSIS SECONDARY TO UTI (HCC): ICD-10-CM

## 2019-12-22 DIAGNOSIS — T83.098A MALFUNCTION OF NEPHROSTOMY TUBE (HCC): ICD-10-CM

## 2019-12-22 DIAGNOSIS — N13.30 HYDRONEPHROSIS OF RIGHT KIDNEY: ICD-10-CM

## 2019-12-22 DIAGNOSIS — N39.0 UTI (URINARY TRACT INFECTION): ICD-10-CM

## 2019-12-22 DIAGNOSIS — C54.1 ENDOMETRIAL CANCER (HCC): Chronic | ICD-10-CM

## 2019-12-22 LAB
ALBUMIN SERPL BCP-MCNC: 3.1 G/DL (ref 3.5–5)
ALP SERPL-CCNC: 101 U/L (ref 46–116)
ALT SERPL W P-5'-P-CCNC: 20 U/L (ref 12–78)
ANION GAP SERPL CALCULATED.3IONS-SCNC: 12 MMOL/L (ref 4–13)
APTT PPP: 63 SECONDS (ref 23–37)
AST SERPL W P-5'-P-CCNC: 15 U/L (ref 5–45)
BACTERIA UR QL AUTO: ABNORMAL /HPF
BASOPHILS # BLD AUTO: 0.03 THOUSANDS/ΜL (ref 0–0.1)
BASOPHILS NFR BLD AUTO: 0 % (ref 0–1)
BILIRUB SERPL-MCNC: 0.27 MG/DL (ref 0.2–1)
BILIRUB UR QL STRIP: NEGATIVE
BUN SERPL-MCNC: 40 MG/DL (ref 5–25)
CALCIUM SERPL-MCNC: 9.8 MG/DL (ref 8.3–10.1)
CHLORIDE SERPL-SCNC: 102 MMOL/L (ref 100–108)
CLARITY UR: ABNORMAL
CO2 SERPL-SCNC: 19 MMOL/L (ref 21–32)
COLOR UR: YELLOW
CREAT SERPL-MCNC: 1.63 MG/DL (ref 0.6–1.3)
EOSINOPHIL # BLD AUTO: 0.01 THOUSAND/ΜL (ref 0–0.61)
EOSINOPHIL NFR BLD AUTO: 0 % (ref 0–6)
ERYTHROCYTE [DISTWIDTH] IN BLOOD BY AUTOMATED COUNT: 13.6 % (ref 11.6–15.1)
FINE GRAN CASTS URNS QL MICRO: ABNORMAL /LPF
GFR SERPL CREATININE-BSD FRML MDRD: 34 ML/MIN/1.73SQ M
GLUCOSE SERPL-MCNC: 104 MG/DL (ref 65–140)
GLUCOSE UR STRIP-MCNC: NEGATIVE MG/DL
HCT VFR BLD AUTO: 27.8 % (ref 34.8–46.1)
HGB BLD-MCNC: 9.2 G/DL (ref 11.5–15.4)
HGB UR QL STRIP.AUTO: ABNORMAL
IMM GRANULOCYTES # BLD AUTO: 0.11 THOUSAND/UL (ref 0–0.2)
IMM GRANULOCYTES NFR BLD AUTO: 1 % (ref 0–2)
INR PPP: 1.25 (ref 0.84–1.19)
KETONES UR STRIP-MCNC: NEGATIVE MG/DL
LACTATE SERPL-SCNC: 0.9 MMOL/L (ref 0.5–2)
LEUKOCYTE ESTERASE UR QL STRIP: ABNORMAL
LYMPHOCYTES # BLD AUTO: 1.25 THOUSANDS/ΜL (ref 0.6–4.47)
LYMPHOCYTES NFR BLD AUTO: 8 % (ref 14–44)
MCH RBC QN AUTO: 30.5 PG (ref 26.8–34.3)
MCHC RBC AUTO-ENTMCNC: 33.1 G/DL (ref 31.4–37.4)
MCV RBC AUTO: 92 FL (ref 82–98)
MONOCYTES # BLD AUTO: 1.51 THOUSAND/ΜL (ref 0.17–1.22)
MONOCYTES NFR BLD AUTO: 10 % (ref 4–12)
NEUTROPHILS # BLD AUTO: 12.64 THOUSANDS/ΜL (ref 1.85–7.62)
NEUTS SEG NFR BLD AUTO: 81 % (ref 43–75)
NITRITE UR QL STRIP: NEGATIVE
NON-SQ EPI CELLS URNS QL MICRO: ABNORMAL /HPF
NRBC BLD AUTO-RTO: 0 /100 WBCS
PH UR STRIP.AUTO: 5.5 [PH]
PLATELET # BLD AUTO: 187 THOUSANDS/UL (ref 149–390)
PMV BLD AUTO: 10.6 FL (ref 8.9–12.7)
POTASSIUM SERPL-SCNC: 3.5 MMOL/L (ref 3.5–5.3)
PROCALCITONIN SERPL-MCNC: 0.85 NG/ML
PROT SERPL-MCNC: 8 G/DL (ref 6.4–8.2)
PROT UR STRIP-MCNC: ABNORMAL MG/DL
PROTHROMBIN TIME: 15.3 SECONDS (ref 11.6–14.5)
RBC # BLD AUTO: 3.02 MILLION/UL (ref 3.81–5.12)
RBC #/AREA URNS AUTO: ABNORMAL /HPF
SODIUM SERPL-SCNC: 133 MMOL/L (ref 136–145)
SP GR UR STRIP.AUTO: 1.01 (ref 1–1.03)
UROBILINOGEN UR QL STRIP.AUTO: 0.2 E.U./DL
WBC # BLD AUTO: 15.55 THOUSAND/UL (ref 4.31–10.16)
WBC #/AREA URNS AUTO: ABNORMAL /HPF

## 2019-12-22 PROCEDURE — 93005 ELECTROCARDIOGRAM TRACING: CPT

## 2019-12-22 PROCEDURE — 99285 EMERGENCY DEPT VISIT HI MDM: CPT

## 2019-12-22 PROCEDURE — 80053 COMPREHEN METABOLIC PANEL: CPT | Performed by: EMERGENCY MEDICINE

## 2019-12-22 PROCEDURE — 85610 PROTHROMBIN TIME: CPT | Performed by: EMERGENCY MEDICINE

## 2019-12-22 PROCEDURE — 36415 COLL VENOUS BLD VENIPUNCTURE: CPT | Performed by: EMERGENCY MEDICINE

## 2019-12-22 PROCEDURE — 85025 COMPLETE CBC W/AUTO DIFF WBC: CPT | Performed by: EMERGENCY MEDICINE

## 2019-12-22 PROCEDURE — 87086 URINE CULTURE/COLONY COUNT: CPT | Performed by: EMERGENCY MEDICINE

## 2019-12-22 PROCEDURE — 74176 CT ABD & PELVIS W/O CONTRAST: CPT

## 2019-12-22 PROCEDURE — 96365 THER/PROPH/DIAG IV INF INIT: CPT

## 2019-12-22 PROCEDURE — 71250 CT THORAX DX C-: CPT

## 2019-12-22 PROCEDURE — 99223 1ST HOSP IP/OBS HIGH 75: CPT | Performed by: INTERNAL MEDICINE

## 2019-12-22 PROCEDURE — 81001 URINALYSIS AUTO W/SCOPE: CPT | Performed by: EMERGENCY MEDICINE

## 2019-12-22 PROCEDURE — 99285 EMERGENCY DEPT VISIT HI MDM: CPT | Performed by: EMERGENCY MEDICINE

## 2019-12-22 PROCEDURE — 96375 TX/PRO/DX INJ NEW DRUG ADDON: CPT

## 2019-12-22 PROCEDURE — 85730 THROMBOPLASTIN TIME PARTIAL: CPT | Performed by: EMERGENCY MEDICINE

## 2019-12-22 PROCEDURE — 84145 PROCALCITONIN (PCT): CPT | Performed by: EMERGENCY MEDICINE

## 2019-12-22 PROCEDURE — 83605 ASSAY OF LACTIC ACID: CPT | Performed by: EMERGENCY MEDICINE

## 2019-12-22 PROCEDURE — 87040 BLOOD CULTURE FOR BACTERIA: CPT | Performed by: EMERGENCY MEDICINE

## 2019-12-22 RX ORDER — ONDANSETRON 2 MG/ML
4 INJECTION INTRAMUSCULAR; INTRAVENOUS ONCE
Status: COMPLETED | OUTPATIENT
Start: 2019-12-22 | End: 2019-12-22

## 2019-12-22 RX ORDER — LISINOPRIL 5 MG/1
5 TABLET ORAL DAILY
COMMUNITY
End: 2019-12-25 | Stop reason: HOSPADM

## 2019-12-22 RX ORDER — ONDANSETRON 2 MG/ML
INJECTION INTRAMUSCULAR; INTRAVENOUS
Status: COMPLETED
Start: 2019-12-22 | End: 2019-12-22

## 2019-12-22 RX ADMIN — ONDANSETRON 4 MG: 2 INJECTION INTRAMUSCULAR; INTRAVENOUS at 21:33

## 2019-12-22 RX ADMIN — CEFEPIME HYDROCHLORIDE 2000 MG: 2 INJECTION, POWDER, FOR SOLUTION INTRAVENOUS at 22:46

## 2019-12-22 RX ADMIN — SODIUM CHLORIDE 1000 ML: 0.9 INJECTION, SOLUTION INTRAVENOUS at 22:42

## 2019-12-23 ENCOUNTER — HOSPITAL ENCOUNTER (OUTPATIENT)
Dept: INFUSION CENTER | Facility: CLINIC | Age: 64
End: 2019-12-23

## 2019-12-23 PROBLEM — N39.0 SEPSIS SECONDARY TO UTI (HCC): Status: ACTIVE | Noted: 2019-12-23

## 2019-12-23 PROBLEM — A41.9 SEPSIS SECONDARY TO UTI (HCC): Status: ACTIVE | Noted: 2019-12-23

## 2019-12-23 LAB
ANION GAP SERPL CALCULATED.3IONS-SCNC: 8 MMOL/L (ref 4–13)
ATRIAL RATE: 90 BPM
BUN SERPL-MCNC: 36 MG/DL (ref 5–25)
CALCIUM SERPL-MCNC: 9.5 MG/DL (ref 8.3–10.1)
CHLORIDE SERPL-SCNC: 106 MMOL/L (ref 100–108)
CO2 SERPL-SCNC: 19 MMOL/L (ref 21–32)
CREAT SERPL-MCNC: 1.44 MG/DL (ref 0.6–1.3)
ERYTHROCYTE [DISTWIDTH] IN BLOOD BY AUTOMATED COUNT: 13.5 % (ref 11.6–15.1)
GFR SERPL CREATININE-BSD FRML MDRD: 39 ML/MIN/1.73SQ M
GLUCOSE SERPL-MCNC: 99 MG/DL (ref 65–140)
HCT VFR BLD AUTO: 26.3 % (ref 34.8–46.1)
HGB BLD-MCNC: 8.4 G/DL (ref 11.5–15.4)
MCH RBC QN AUTO: 29.7 PG (ref 26.8–34.3)
MCHC RBC AUTO-ENTMCNC: 31.9 G/DL (ref 31.4–37.4)
MCV RBC AUTO: 93 FL (ref 82–98)
P AXIS: 37 DEGREES
PLATELET # BLD AUTO: 164 THOUSANDS/UL (ref 149–390)
PMV BLD AUTO: 10 FL (ref 8.9–12.7)
POTASSIUM SERPL-SCNC: 3.6 MMOL/L (ref 3.5–5.3)
PR INTERVAL: 142 MS
PROCALCITONIN SERPL-MCNC: 0.75 NG/ML
QRS AXIS: -26 DEGREES
QRSD INTERVAL: 100 MS
QT INTERVAL: 340 MS
QTC INTERVAL: 415 MS
RBC # BLD AUTO: 2.83 MILLION/UL (ref 3.81–5.12)
SODIUM SERPL-SCNC: 133 MMOL/L (ref 136–145)
T WAVE AXIS: 59 DEGREES
VENTRICULAR RATE: 90 BPM
WBC # BLD AUTO: 11.8 THOUSAND/UL (ref 4.31–10.16)

## 2019-12-23 PROCEDURE — 85027 COMPLETE CBC AUTOMATED: CPT | Performed by: INTERNAL MEDICINE

## 2019-12-23 PROCEDURE — 80048 BASIC METABOLIC PNL TOTAL CA: CPT | Performed by: INTERNAL MEDICINE

## 2019-12-23 PROCEDURE — 99253 IP/OBS CNSLTJ NEW/EST LOW 45: CPT | Performed by: OBSTETRICS & GYNECOLOGY

## 2019-12-23 PROCEDURE — 99252 IP/OBS CONSLTJ NEW/EST SF 35: CPT | Performed by: PHYSICIAN ASSISTANT

## 2019-12-23 PROCEDURE — 84145 PROCALCITONIN (PCT): CPT | Performed by: INTERNAL MEDICINE

## 2019-12-23 PROCEDURE — 99232 SBSQ HOSP IP/OBS MODERATE 35: CPT | Performed by: NURSE PRACTITIONER

## 2019-12-23 PROCEDURE — 93010 ELECTROCARDIOGRAM REPORT: CPT | Performed by: INTERNAL MEDICINE

## 2019-12-23 RX ORDER — PANTOPRAZOLE SODIUM 40 MG/1
40 TABLET, DELAYED RELEASE ORAL
Status: DISCONTINUED | OUTPATIENT
Start: 2019-12-23 | End: 2019-12-25 | Stop reason: HOSPADM

## 2019-12-23 RX ORDER — SODIUM CHLORIDE 9 MG/ML
100 INJECTION, SOLUTION INTRAVENOUS CONTINUOUS
Status: DISCONTINUED | OUTPATIENT
Start: 2019-12-23 | End: 2019-12-24

## 2019-12-23 RX ORDER — VENLAFAXINE 37.5 MG/1
75 TABLET ORAL 3 TIMES DAILY
Status: DISCONTINUED | OUTPATIENT
Start: 2019-12-23 | End: 2019-12-25 | Stop reason: HOSPADM

## 2019-12-23 RX ORDER — ASPIRIN 81 MG/1
81 TABLET ORAL DAILY
Status: DISCONTINUED | OUTPATIENT
Start: 2019-12-23 | End: 2019-12-25 | Stop reason: HOSPADM

## 2019-12-23 RX ORDER — ARIPIPRAZOLE 10 MG/1
10 TABLET ORAL DAILY
Status: DISCONTINUED | OUTPATIENT
Start: 2019-12-23 | End: 2019-12-25 | Stop reason: HOSPADM

## 2019-12-23 RX ORDER — FOLIC ACID 1 MG/1
1 TABLET ORAL DAILY
Status: DISCONTINUED | OUTPATIENT
Start: 2019-12-23 | End: 2019-12-25 | Stop reason: HOSPADM

## 2019-12-23 RX ORDER — SODIUM CHLORIDE, SODIUM GLUCONATE, SODIUM ACETATE, POTASSIUM CHLORIDE, MAGNESIUM CHLORIDE, SODIUM PHOSPHATE, DIBASIC, AND POTASSIUM PHOSPHATE .53; .5; .37; .037; .03; .012; .00082 G/100ML; G/100ML; G/100ML; G/100ML; G/100ML; G/100ML; G/100ML
100 INJECTION, SOLUTION INTRAVENOUS CONTINUOUS
Status: DISCONTINUED | OUTPATIENT
Start: 2019-12-23 | End: 2019-12-23

## 2019-12-23 RX ORDER — OXYBUTYNIN CHLORIDE 5 MG/1
10 TABLET, EXTENDED RELEASE ORAL DAILY
Status: DISCONTINUED | OUTPATIENT
Start: 2019-12-23 | End: 2019-12-25 | Stop reason: HOSPADM

## 2019-12-23 RX ORDER — MELATONIN
1000 DAILY
Status: DISCONTINUED | OUTPATIENT
Start: 2019-12-23 | End: 2019-12-25 | Stop reason: HOSPADM

## 2019-12-23 RX ORDER — ACETAMINOPHEN 325 MG/1
650 TABLET ORAL EVERY 6 HOURS PRN
Status: DISCONTINUED | OUTPATIENT
Start: 2019-12-23 | End: 2019-12-25 | Stop reason: HOSPADM

## 2019-12-23 RX ORDER — SACCHAROMYCES BOULARDII 250 MG
250 CAPSULE ORAL 2 TIMES DAILY
Status: DISCONTINUED | OUTPATIENT
Start: 2019-12-23 | End: 2019-12-25 | Stop reason: HOSPADM

## 2019-12-23 RX ORDER — MIRABEGRON 50 MG/1
TABLET, FILM COATED, EXTENDED RELEASE ORAL
Qty: 90 TABLET | Refills: 3 | Status: SHIPPED | OUTPATIENT
Start: 2019-12-23 | End: 2020-08-08 | Stop reason: HOSPADM

## 2019-12-23 RX ADMIN — PANTOPRAZOLE SODIUM 40 MG: 40 TABLET, DELAYED RELEASE ORAL at 06:07

## 2019-12-23 RX ADMIN — OXYBUTYNIN 10 MG: 5 TABLET, FILM COATED, EXTENDED RELEASE ORAL at 10:50

## 2019-12-23 RX ADMIN — MELATONIN 1000 UNITS: at 10:49

## 2019-12-23 RX ADMIN — ARIPIPRAZOLE 10 MG: 10 TABLET ORAL at 11:00

## 2019-12-23 RX ADMIN — Medication 250 MG: at 17:34

## 2019-12-23 RX ADMIN — MUPIROCIN: 20 OINTMENT TOPICAL at 15:52

## 2019-12-23 RX ADMIN — FOLIC ACID 1 MG: 1 TABLET ORAL at 10:50

## 2019-12-23 RX ADMIN — SODIUM CHLORIDE, SODIUM GLUCONATE, SODIUM ACETATE, POTASSIUM CHLORIDE AND MAGNESIUM CHLORIDE 100 ML/HR: 526; 502; 368; 37; 30 INJECTION, SOLUTION INTRAVENOUS at 01:39

## 2019-12-23 RX ADMIN — ASPIRIN 81 MG: 81 TABLET ORAL at 10:50

## 2019-12-23 RX ADMIN — SODIUM CHLORIDE 100 ML/HR: 0.9 INJECTION, SOLUTION INTRAVENOUS at 18:44

## 2019-12-23 RX ADMIN — VENLAFAXINE 75 MG: 37.5 TABLET ORAL at 10:55

## 2019-12-23 RX ADMIN — VENLAFAXINE 75 MG: 37.5 TABLET ORAL at 22:08

## 2019-12-23 RX ADMIN — VENLAFAXINE 75 MG: 37.5 TABLET ORAL at 15:52

## 2019-12-23 RX ADMIN — CEFEPIME HYDROCHLORIDE 2000 MG: 2 INJECTION, POWDER, FOR SOLUTION INTRAVENOUS at 22:08

## 2019-12-23 RX ADMIN — CEFEPIME HYDROCHLORIDE 2000 MG: 2 INJECTION, POWDER, FOR SOLUTION INTRAVENOUS at 11:01

## 2019-12-23 RX ADMIN — Medication 250 MG: at 10:50

## 2019-12-23 RX ADMIN — ACETAMINOPHEN 650 MG: 325 TABLET ORAL at 22:12

## 2019-12-23 NOTE — ED PROVIDER NOTES
History  Chief Complaint   Patient presents with    Fever - 9 weeks to 74 years     per ems - patient is chemo patient, last received 3 weeks ago, began today with fever and feeling "flushed"  Has nephrostomy tube and c/o pain in that area     HPI     60 y o  F w/ hx of endometrial cancer stage 1B on taxol and carboplatin every 21 days, last chemo 3 weeks ago, on neulasta,  hx of DVT on daily lovenox 120 mg, DM  Hysterectomy Dec 2015  Patient had noted a fever at 5PM   She has associated weakness and fatigue, diaphoresis  She gets dyspnea with exertion as well  She also has a mild non productive cough  Does have pain in Right Nephrostomy tube (first placed July 2019, second change Oct 2019)  Placed because 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 Taking?    ARIPiprazole (ABILIFY) 10 mg tablet 12/22/2019 at Unknown time Self Yes Yes   Sig: Take 10 mg by mouth daily   LORazepam (ATIVAN) 1 mg tablet   No No   Sig: Take 1 tablet (1 mg total) by mouth every 6 (six) hours as needed for anxiety (or nausea) for up to 10 days   MYRBETRIQ 50 MG TB24  Self No Yes   Sig: TAKE 1 TABLET DAILY   Pegfilgrastim (NEULASTA DELIVERY KIT SC)  Self Yes No   Sig: Inject under the skin every 21 days   acetaminophen (TYLENOL) 325 mg tablet 12/22/2019 at Unknown time Self No Yes   Sig: Take 2 tablets (650 mg total) by mouth every 6 (six) hours as needed for mild pain, headaches or fever   aspirin (ECOTRIN LOW STRENGTH) 81 mg EC tablet 12/22/2019 at Unknown time Self Yes Yes   Sig: Take 81 mg by mouth daily   cholecalciferol (VITAMIN D3) 1,000 units tablet 12/22/2019 at Unknown time Self Yes Yes   Sig: Take 1,000 Units by mouth daily   enoxaparin (LOVENOX) 100 mg/mL  Self No No   Sig: Inject 0 9 mL (90 mg total) under the skin every 12 (twelve) hours   Patient taking differently: Inject 120 mg under the skin daily    enoxaparin (LOVENOX) 120 mg/0 8 mL 12/22/2019 at Unknown time  No Yes   Sig: Inject 0 8 mL (120 mg total) under the skin daily   folic acid (FOLVITE) 1 mg tablet 12/22/2019 at Unknown time Self No Yes   Sig: Take 1 tablet (1 mg total) by mouth daily   gemfibrozil (LOPID) 600 mg tablet 12/22/2019 at Unknown time Self No Yes   Sig: TAKE 1 TABLET BY MOUTH DAILY   lisinopril (ZESTRIL) 5 mg tablet 12/22/2019 at Unknown time  Yes Yes   Sig: Take 5 mg by mouth daily   omeprazole (PriLOSEC) 20 mg delayed release capsule 12/22/2019 at Unknown time Self Yes Yes   Sig: Take 20 mg by mouth daily   ondansetron (ZOFRAN) 8 mg tablet 12/22/2019 at Unknown time Self No Yes   Sig: Take 1 tablet (8 mg total) by mouth every 8 (eight) hours as needed for nausea or vomiting   potassium chloride (KLOR-CON) 20 mEq packet Not Taking at Unknown time  No No   Sig: Take 2 packets PO TID x 3 days, then 2 packets PO BID x 3 days   Patient not taking: Reported on 12/19/2019   quinapril (ACCUPRIL) 5 mg tablet 12/22/2019 at Unknown time Self No Yes   Sig: Take 1 tablet (5 mg total) by mouth daily at bedtime   saccharomyces boulardii (FLORASTOR) 250 mg capsule 12/22/2019 at Unknown time Self No Yes   Sig: Take 1 capsule (250 mg total) by mouth 2 (two) times a day   venlafaxine (EFFEXOR) 75 mg tablet 12/22/2019 at Unknown time Self Yes Yes   Sig: Take 75 mg by mouth 3 (three) times a day        Facility-Administered Medications: None       Past Medical History:   Diagnosis Date    Anemia     Chemotherapy follow-up examination     Depression     Diabetes mellitus ([facility] [address] )     Endometrial cancer ([facility] [address] ) 12/2017    Hyperglycemia     vx type 2 dm -- last assessed 4/1/14; resolved 11/7/17    Hyperlipidemia     Hypertension     Obesity     last assessed 10/14/17; resolved 11/7/17       Past Surgical History:   Procedure Laterality Date    ABDOMINAL SURGERY      GASTRIC BYPASS    CHOLECYSTECTOMY      at the time of gastric bypass    COLONOSCOPY      CT NEEDLE BIOPSY LYMPH NODE  7/8/2019    FL GUIDED CENTRAL VENOUS ACCESS DEVICE INSERTION  11/12/2019    GASTRIC BYPASS      HYSTERECTOMY Bilateral     total abdominal with salpingo-oophorectomy    IR PICC LINE  9/27/2019    IR PORT PLACEMENT  7/26/2019    IR PORT REMOVAL  9/20/2019    IR TUBE PLACEMENT NEPHROSTOMY  7/26/2019    OOPHORECTOMY Bilateral     IL INSJ TUNNELED CTR VAD W/SUBQ PORT AGE 5 YR/> Left 11/12/2019    Procedure: INSERTION VENOUS PORT ( PORT-A-CATH) IR;  Surgeon: Jessica Harrell DO;  [facility];  Service: Interventional Radiology    IL LAP, RADICAL HYST W/ TUBE&OV, NODE BX N/A 12/19/2017    Procedure: HYSTERECTOMY LAPAROSCOPIC TOTAL ([address]) W/ ROBOTICS; BILATERAL SALPINGOOPHERECTOMY; LYMPH NODE DISSECTION; lysis of adhesions;  Surgeon: Jose Daniel Gray MD;  [facility];  Service: Gynecology Oncology    IL LAP,DIAGNOSTIC ABDOMEN N/A 12/19/2017    Procedure: LAPAROSCOPY DIAGNOSTIC;  Surgeon: Jose Daniel Gray MD;  [facility];  Service: Gynecology Oncology    TONSILLECTOMY      US GUIDED BREAST BIOPSY RIGHT COMPLETE Right 6/28/2019       Family History   Problem Relation Age of Onset    Other Mother         dyslipidemia    Ovarian cancer Mother 48    Lymphoma Father     Bone cancer Maternal Grandfather     No Known Problems Sister     No Known Problems Maternal Grandmother     No Known Problems Paternal Grandmother     No Known Problems Paternal Grandfather     No Known Problems Maternal Aunt     No Known Problems Maternal Aunt     No Known Problems Maternal Aunt     No Known Problems Maternal Aunt     No Known Problems Paternal Aunt     No Known Problems Paternal Aunt     No Known Problems Paternal Aunt     No Known Problems Paternal Aunt     No Known Problems Brother     No Known Problems Brother      I have reviewed and agree with the history as documented      Social History     Tobacco Use    Smoking status: Never Smoker    Smokeless tobacco: Never Used   Substance Use Topics    Alcohol use: Not Currently    Drug use: No        Review of Systems   Constitutional: Positive for chills, diaphoresis, fatigue and fever  HENT: Negative for sore throat  Eyes: Negative for redness and visual disturbance  Respiratory: Positive for cough  Negative for shortness of breath  Cardiovascular: Negative for chest pain  Gastrointestinal: Negative for abdominal pain, diarrhea and nausea  Genitourinary: Negative for difficulty urinating, dysuria and pelvic pain  Musculoskeletal: Negative for back pain  Skin: Negative for rash  Neurological: Positive for weakness  Negative for syncope and headaches  All other systems reviewed and are negative  Physical Exam  ED Triage Vitals [12/22/19 2026]   Temperature Pulse Respirations Blood Pressure SpO2   98 1 °F (36 7 °C) 96 20 101/55 97 %      Temp Source Heart Rate Source Patient Position - Orthostatic VS BP Location FiO2 (%)   Oral Monitor -- -- --      Pain Score       5             Orthostatic Vital Signs  Vitals:    12/22/19 2026 12/22/19 2246 12/23/19 0035   BP: 101/55 105/57 117/61   Pulse: 96 83 103       Physical Exam   Constitutional: She is oriented to person, place, and time  She appears well-developed and well-nourished  No distress  HENT:   Head: Normocephalic and atraumatic  Eyes: Conjunctivae are normal    Neck: Normal range of motion  Cardiovascular: Normal rate, regular rhythm and normal heart sounds  Pulmonary/Chest: Effort normal and breath sounds normal  No respiratory distress  Abdominal: Soft  There is tenderness  Tender right lower quadrant   Musculoskeletal: Normal range of motion  Neurological: She is alert and oriented to person, place, and time  No cranial nerve deficit or sensory deficit  She exhibits normal muscle tone  Coordination normal    Skin: Skin is warm and dry  No rash noted     Right sided nephrostomy tube in place    [facility] on left side of chest wall   Psychiatric: She has a normal mood and affect  Nursing note and vitals reviewed  ED Medications  Medications   acetaminophen (TYLENOL) tablet 650 mg (has no administration in time range)   aspirin (ECOTRIN LOW STRENGTH) EC tablet 81 mg (has no administration in time range)   ARIPiprazole (ABILIFY) tablet 10 mg (has no administration in time range)   cholecalciferol (VITAMIN D3) tablet 1,000 Units (has no administration in time range)   folic acid (FOLVITE) tablet 1 mg (has no administration in time range)   venlafaxine (EFFEXOR) tablet 75 mg (has no administration in time range)   saccharomyces boulardii (FLORASTOR) capsule 250 mg (has no administration in time range)   pantoprazole (PROTONIX) EC tablet 40 mg (has no administration in time range)   oxybutynin (DITROPAN-XL) 24 hr tablet 10 mg (has no administration in time range)   multi-electrolyte (PLASMALYTE-A/ISOLYTE-S PH 7 4) IV solution (100 mL/hr Intravenous New Bag 12/23/19 0139)   cefepime (MAXIPIME) 2,000 mg in dextrose 5 % 50 mL IVPB (has no administration in time range)   ondansetron (ZOFRAN) injection 4 mg (4 mg Intravenous Given 12/22/19 2133)   sodium chloride 0 9 % bolus 1,000 mL (0 mL Intravenous Stopped 12/22/19 2357)   cefepime (MAXIPIME) 2 g/50 mL dextrose IVPB (0 mg Intravenous Stopped 12/22/19 2357)       Diagnostic Studies  Results Reviewed     Procedure Component Value Units Date/Time    Urine Microscopic [[id number]]  (Abnormal) Collected:  12/22/19 2114    Lab Status:  Final result Specimen:  Urine, Clean Catch Updated:  12/22/19 2222     RBC, UA 20-30 /hpf      WBC, UA Innumerable /hpf      Epithelial Cells Occasional /hpf      Bacteria, UA Innumerable /hpf      Fine granular casts 0-3 /lpf     Urine culture [[id number]] Collected:  12/22/19 2114    Lab Status:   In process Specimen:  Urine, Clean Catch Updated:  12/22/19 2222    Procalcitonin [[id number]]  (Abnormal) Collected:  12/22/19 2103    Lab Status: Final result Specimen:  Blood from Arm, Left Updated:  12/22/19 2203     Procalcitonin 0 85 ng/ml     UA w Reflex to Microscopic w Reflex to Culture [[id number]]  (Abnormal) Collected:  12/22/19 2114    Lab Status:  Final result Specimen:  Urine, Clean Catch Updated:  12/22/19 2159     Color, UA Yellow     Clarity, UA Turbid     Specific [place], UA 1 014     pH, UA 5 5     Leukocytes, UA Moderate     Nitrite, UA Negative     Protein,  (2+) mg/dl      Glucose, UA Negative mg/dl      Ketones, UA Negative mg/dl      Urobilinogen, UA 0 2 E U /dl      Bilirubin, UA Negative     Blood, UA Large    Lactic acid x2 [[id number]]  (Normal) Collected:  12/22/19 2103    Lab Status:  Final result Specimen:  Blood from Arm, Left Updated:  12/22/19 2148     LACTIC ACID 0 9 mmol/L     Narrative:       Result may be elevated if tourniquet was used during collection      Protime-INR [[id number]]  (Abnormal) Collected:  12/22/19 2103    Lab Status:  Final result Specimen:  Blood from Arm, Left Updated:  12/22/19 2140     Protime 15 3 seconds      INR 1 25    APTT [[id number]]  (Abnormal) Collected:  12/22/19 2103    Lab Status:  Final result Specimen:  Blood from Arm, Left Updated:  12/22/19 2140     PTT 63 seconds     Comprehensive metabolic panel [[id number]]  (Abnormal) Collected:  12/22/19 2103    Lab Status:  Final result Specimen:  Blood from Arm, Left Updated:  12/22/19 2139     Sodium 133 mmol/L      Potassium 3 5 mmol/L      Chloride 102 mmol/L      CO2 19 mmol/L      ANION GAP 12 mmol/L      BUN 40 mg/dL      Creatinine 1 63 mg/dL      Glucose 104 mg/dL      Calcium 9 8 mg/dL      AST 15 U/L      ALT 20 U/L      Alkaline Phosphatase 101 U/L      Total Protein 8 0 g/dL      Albumin 3 1 g/dL      Total Bilirubin 0 27 mg/dL      eGFR 34 ml/min/1 73sq m     Narrative:       [facility] guidelines for Chronic Kidney Disease (CKD):     Stage 1 with normal or high GFR (GFR > 90 mL/min/1 73 square meters)   Stage 2 Mild CKD (GFR = 60-89 mL/min/1 73 square meters)    Stage 3A Moderate CKD (GFR = 45-59 mL/min/1 73 square meters)    Stage 3B Moderate CKD (GFR = 30-44 mL/min/1 73 square meters)    Stage 4 Severe CKD (GFR = 15-29 mL/min/1 73 square meters)    Stage 5 End Stage CKD (GFR <15 mL/min/1 73 square meters)  Note: GFR calculation is accurate only with a steady state creatinine    CBC and differential [[id number]]  (Abnormal) Collected:  12/22/19 2103    Lab Status:  Final result Specimen:  Blood from Arm, Left Updated:  12/22/19 2114     WBC 15 55 Thousand/uL      RBC 3 02 Million/uL      Hemoglobin 9 2 g/dL      Hematocrit 27 8 %      MCV 92 fL      MCH 30 5 pg      MCHC 33 1 g/dL      RDW 13 6 %      MPV 10 6 fL      Platelets 621 Thousands/uL      nRBC 0 /100 WBCs      Neutrophils Relative 81 %      Immat GRANS % 1 %      Lymphocytes Relative 8 %      Monocytes Relative 10 %      Eosinophils Relative 0 %      Basophils Relative 0 %      Neutrophils Absolute 12 64 Thousands/µL      Immature Grans Absolute 0 11 Thousand/uL      Lymphocytes Absolute 1 25 Thousands/µL      Monocytes Absolute 1 51 Thousand/µL      Eosinophils Absolute 0 01 Thousand/µL      Basophils Absolute 0 03 Thousands/µL     Blood culture #2 [[id number]] Collected:  12/22/19 2103    Lab Status: In process Specimen:  Blood from Arm, Left Updated:  12/22/19 2109    Blood culture #1 [[id number]] Collected:  12/22/19 2104    Lab Status: In process Specimen:  Blood from Arm, Right Updated:  12/22/19 2109                 CT chest abdomen pelvis wo contrast   Final Result by Melissa Milligan MD (12/22 2314)      Worsening right-sided hydronephrosis in comparison to the prior exams  Lack of contrast limits evaluation  No obvious fluid collection to suggest an abscess  Similar appearance of right external iliac chain lymphadenopathy           Workstation performed: [id number]         IR consult    (Results Pending) Procedures  Procedures      ED Course  ED Course as of Dec 23 0149   Sun Dec 22, 2019   2200 Leukocytes, UA(!): Moderate   2200 MONROE Cr 1 6 baseline 1 0      2202 Baseline 140   Sodium(!): 133   2202 WBC(!): 15 55   2206 Neutrophils %(!): 81   2207 WBC(!): 15 55   2232 Bacteria, UA(!): Innumerable   2232 Procalcitonin(!): 0 85   2321 Dr Celia Aguilar,       2326 Dr Celia Aguilar stated to admit to medicine and have IR consult to change nephrostomy tube  MDM    57-year-old female who presents to the ED for evaluation of fever  The patient is a cancer patient, there is concern for neutropenic fever  The patient he will get a septic workup  Will also give a CT scan, given that she has abdominal tenderness, as well as a nephrostomy tube with pain around the site  CT scan showed that she has increased right-sided hydronephrosis  The patient also has a KI  The patient's leukocytosis  Patient was started on cefepime  She does not appear to have neutropenia at this time  Given the CT scan results, the case was discussed with Urology, they recommended the patient to be admitted, with an IR exchanged nephrostomy tube tomorrow  The patient was admitted to Medicine for further management    Disposition  Final diagnoses:   Fever   Endometrial cancer ([facility] [address] )   Hydronephrosis of right kidney   UTI (urinary tract infection)   Malfunction of nephrostomy tube [facility])     Time reflects when diagnosis was documented in both MDM as applicable and the Disposition within this note     Time User Action Codes Description Comment    12/22/2019 11:42 PM Beverlie Glassing Add [R50 9] Fever     12/22/2019 11:42 PM Beverlie Glassing Add [C54 1] Endometrial cancer ([address] )     12/22/2019 11:42 PM Beverlie Glassing Add [N13 30] Hydronephrosis of right kidney     12/22/2019 11:42 PM Beverlie Glassing Add [N39 0] UTI (urinary tract infection)     12/22/2019 11:44 PM Beverlie Glassing Add [T83 098A] Malfunction of nephrostomy tube [facility])       ED Disposition     ED Disposition Condition Date/Time Comment    Admit Urban Lim Dec 22, 2019 11:42 PM Case was discussed with DAVID and the patient's admission status was agreed to be Admission Status: inpatient status to the service of Dr Niko Weber          Follow-up Information    None         Current Discharge Medication List      CONTINUE these medications which have NOT CHANGED    Details   acetaminophen (TYLENOL) 325 mg tablet Take 2 tablets (650 mg total) by mouth every 6 (six) hours as needed for mild pain, headaches or fever  Qty: 30 tablet, Refills: 0    Associated Diagnoses: Pain      ARIPiprazole (ABILIFY) 10 mg tablet Take 10 mg by mouth daily      aspirin (ECOTRIN LOW STRENGTH) 81 mg EC tablet Take 81 mg by mouth daily      cholecalciferol (VITAMIN D3) 1,000 units tablet Take 1,000 Units by mouth daily      enoxaparin (LOVENOX) 120 mg/0 8 mL Inject 0 8 mL (120 mg total) under the skin daily  Qty: 30 Syringe, Refills: 3    Associated Diagnoses: Acute deep vein thrombosis (DVT) of proximal vein of lower extremity, unspecified laterality (HCC)      folic acid (FOLVITE) 1 mg tablet Take 1 tablet (1 mg total) by mouth daily  Refills: 0    Associated Diagnoses: Anemia      gemfibrozil (LOPID) 600 mg tablet TAKE 1 TABLET BY MOUTH DAILY  Qty: 90 tablet, Refills: 1    Associated Diagnoses: Dyslipidemia      lisinopril (ZESTRIL) 5 mg tablet Take 5 mg by mouth daily      MYRBETRIQ 50 MG TB24 TAKE 1 TABLET DAILY  Qty: 30 tablet, Refills: 10    Associated Diagnoses: Urinary urgency      omeprazole (PriLOSEC) 20 mg delayed release capsule Take 20 mg by mouth daily      ondansetron (ZOFRAN) 8 mg tablet Take 1 tablet (8 mg total) by mouth every 8 (eight) hours as needed for nausea or vomiting  Qty: 30 tablet, Refills: 1    Associated Diagnoses: Chemotherapy-induced nausea      quinapril (ACCUPRIL) 5 mg tablet Take 1 tablet (5 mg total) by mouth daily at bedtime  Qty: 90 tablet, Refills: 1    Associated Diagnoses: Benign essential hypertension      saccharomyces boulardii (FLORASTOR) 250 mg capsule Take 1 capsule (250 mg total) by mouth 2 (two) times a day  Refills: 0    Associated Diagnoses: MSSA bacteremia      venlafaxine (EFFEXOR) 75 mg tablet Take 75 mg by mouth 3 (three) times a day        enoxaparin (LOVENOX) 100 mg/mL Inject 0 9 mL (90 mg total) under the skin every 12 (twelve) hours  Qty: 60 Syringe, Refills: 3    Associated Diagnoses: Endometrial cancer ([facility] [address] ); Acute deep vein thrombosis (DVT) of proximal vein of right lower extremity (HCC)      LORazepam (ATIVAN) 1 mg tablet Take 1 tablet (1 mg total) by mouth every 6 (six) hours as needed for anxiety (or nausea) for up to 10 days  Qty: 10 tablet, Refills: 0    Associated Diagnoses: Chemotherapy-induced nausea      Pegfilgrastim (NEULASTA DELIVERY KIT SC) Inject under the skin every 21 days      potassium chloride (KLOR-CON) 20 mEq packet Take 2 packets PO TID x 3 days, then 2 packets PO BID x 3 days  Qty: 30 packet, Refills: 1    Associated Diagnoses: Hypokalemia           No discharge procedures on file  ED Provider  Attending physically available and evaluated Cami Section  I managed the patient along with the ED Attending      Electronically Signed by         Hayde Johnson MD  12/23/19 8603

## 2019-12-23 NOTE — ASSESSMENT & PLAN NOTE
· On therapeutic Lovenox; will temporarily hold pending replacement of right nephrostomy tube, likely to resume postprocedure

## 2019-12-23 NOTE — ASSESSMENT & PLAN NOTE
· On therapeutic Lovenox at home  · Held on admission for possible PCN exchange  · NO intervention planned, therefore, ok to resume Lovenox

## 2019-12-23 NOTE — CONSULTS
[address] NOTE   Admission Date: 12/22/2019    Patient Identifiers: Margarito Devine (MRN: [id number])  Service Requesting Consultation: Leydi Mancini MD  Service Providing Consultation:  Urology, Haydee Posadas PA-C  Consults  Date of Service: 12/23/2019    Reason for Consultation:  Sepsis with hydronephrosis    History of Present Illness:     Margarito Devine is a 60 y o  female with a history of recurrent endometrial cancer with pelvic mass causing right ureteral obstruction managed with percutaneous nephroureteral stent  Her stent has been capped and she flushes it daily  She presented with onset of fever right flank pain  She noticed several days of generalized weakness and fatigue  She also had abdominal pain in the suprapubic area with some increased urgency for urination  Her CT scan showed worsening right-sided hydronephrosis  There is no hematuria  Her creatinine was 1 6 on admission and is now 1 44  WBCs 15 55      Past Medical, Past Surgical History:     Past Medical History:   Diagnosis Date    Anemia     Chemotherapy follow-up examination     Depression     Diabetes mellitus ([facility] [address] )     Endometrial cancer ([facility] [address] ) 12/2017    Hyperglycemia     vx type 2 dm -- last assessed 4/1/14; resolved 11/7/17    Hyperlipidemia     Hypertension     Obesity     last assessed 10/14/17; resolved 11/7/17   :    Past Surgical History:   Procedure Laterality Date    ABDOMINAL SURGERY      GASTRIC BYPASS    CHOLECYSTECTOMY      at the time of gastric bypass    COLONOSCOPY      CT NEEDLE BIOPSY LYMPH NODE  7/8/2019    FL GUIDED CENTRAL VENOUS ACCESS DEVICE INSERTION  11/12/2019    GASTRIC BYPASS      HYSTERECTOMY Bilateral     total abdominal with salpingo-oophorectomy    IR PICC LINE  9/27/2019    IR PORT PLACEMENT  7/26/2019    IR PORT REMOVAL  9/20/2019    IR TUBE PLACEMENT NEPHROSTOMY  7/26/2019    OOPHORECTOMY Bilateral     CA INSJ TUNNELED CTR VAD W/SUBQ PORT AGE 5 YR/> Left 11/12/2019    Procedure: INSERTION VENOUS PORT ( PORT-A-CATH) IR;  Surgeon: Ibis Camp DO;  [facility];  Service: Interventional Radiology    IA LAP, RADICAL HYST W/ TUBE&OV, NODE BX N/A 12/19/2017    Procedure: HYSTERECTOMY LAPAROSCOPIC TOTAL ([address]) W/ ROBOTICS; BILATERAL SALPINGOOPHERECTOMY; LYMPH NODE DISSECTION; lysis of adhesions;  Surgeon: John De La Vega MD;  [facility];  Service: Gynecology Oncology    IA LAP,DIAGNOSTIC ABDOMEN N/A 12/19/2017    Procedure: LAPAROSCOPY DIAGNOSTIC;  Surgeon: John De La Vega MD;  [facility];  Service: Gynecology Oncology    TONSILLECTOMY      US GUIDED BREAST BIOPSY RIGHT COMPLETE Right 6/28/2019   :    Medications, Allergies:     Current Facility-Administered Medications:     acetaminophen (TYLENOL) tablet 650 mg, 650 mg, Oral, Q6H PRN, Araseli Cesar, DO    ARIPiprazole (ABILIFY) tablet 10 mg, 10 mg, Oral, Daily, Araseli Cesar, DO    aspirin (ECOTRIN LOW STRENGTH) EC tablet 81 mg, 81 mg, Oral, Daily, Araseli Cesar, DO    cefepime (MAXIPIME) 2,000 mg in dextrose 5 % 50 mL IVPB, 2,000 mg, Intravenous, Q12H, Araseli Cesar, DO    cholecalciferol (VITAMIN D3) tablet 1,000 Units, 1,000 Units, Oral, Daily, Araseli Cesar, DO    folic acid (FOLVITE) tablet 1 mg, 1 mg, Oral, Daily, Araseli Cesar, DO    multi-electrolyte (PLASMALYTE-A/ISOLYTE-S PH 7 4) IV solution, 100 mL/hr, Intravenous, Continuous, Araseli Cesar, DO, Last Rate: 100 mL/hr at 12/23/19 0139, 100 mL/hr at 12/23/19 0139    oxybutynin (DITROPAN-XL) 24 hr tablet 10 mg, 10 mg, Oral, Daily, Araseli Cesar, DO    pantoprazole (PROTONIX) EC tablet 40 mg, 40 mg, Oral, Early Morning, Araseli Cesar, DO, 40 mg at 12/23/19 0607    saccharomyces boulardii (FLORASTOR) capsule 250 mg, 250 mg, Oral, BID, Araseli Guerrero DO    venlafaxine [facility]) tablet 75 mg, 75 mg, Oral, TID, Araseli Guerrero DO    Allergies:   Allergies Allergen Reactions    Cephalosporins Rash     Which Cephalosporin reaction was to not specified; however, has tolerated Amoxicillin, Cefazolin, and Cefepime   :    Social and Family History:   Social History:   Social History     Tobacco Use    Smoking status: Never Smoker    Smokeless tobacco: Never Used   Substance Use Topics    Alcohol use: Not Currently    Drug use: No        Social History     Tobacco Use   Smoking Status Never Smoker   Smokeless Tobacco Never Used       Family History:  Family History   Problem Relation Age of Onset    Other Mother         dyslipidemia    Ovarian cancer Mother 48    Lymphoma Father     Bone cancer Maternal Grandfather     No Known Problems Sister     No Known Problems Maternal Grandmother     No Known Problems Paternal Grandmother     No Known Problems Paternal Grandfather     No Known Problems Maternal Aunt     No Known Problems Maternal Aunt     No Known Problems Maternal Aunt     No Known Problems Maternal Aunt     No Known Problems Paternal Aunt     No Known Problems Paternal Aunt     No Known Problems Paternal Aunt     No Known Problems Paternal Aunt     No Known Problems Brother     No Known Problems Brother    :     Review of Systems:     General: Fever, chills, or night sweats: positive  Cardiac: Negative for chest pain  Pulmonary: Negative for shortness of breath  Gastrointestinal: Abdominal pain negative  Nausea, vomiting, or diarrhea negative,  Genitourinary: See HPI above  Patient does not have hematuria  All other systems queried were negative  Physical Exam:   General: Patient is pleasant and in NAD  Awake and alert  BP 93/51   Pulse 78   Temp 98 9 °F (37 2 °C)   Resp 18   Ht 4' 11" (1 499 m)   Wt 83 2 kg (183 lb 6 4 oz)   LMP  (LMP Unknown)   SpO2 98%   BMI 37 04 kg/m²   Cardiac: Peripheral edema: negative  Pulmonary: Non-labored breathing  Abdomen: Soft, non-tender, non-distended  No surgical scars    No masses, tenderness, hernias noted  Genitourinary: Negative CVA tenderness, negative suprapubic tenderness  Labs:     Lab Results   Component Value Date    HGB 8 4 (L) 12/23/2019    HCT 26 3 (L) 12/23/2019    WBC 11 80 (H) 12/23/2019     12/23/2019   ]    Lab Results   Component Value Date     10/27/2017    K 3 6 12/23/2019     12/23/2019    CO2 19 (L) 12/23/2019    BUN 36 (H) 12/23/2019    CREATININE 1 44 (H) 12/23/2019    CALCIUM 9 5 12/23/2019    GLUCOSE 219 (H) 12/19/2017   ]    Imaging:   I personally reviewed the images and report of the following studies, and reviewed them with the patient:  CT CHEST, ABDOMEN AND PELVIS WITHOUT IV CONTRAST     IMPRESSION:     Worsening right-sided hydronephrosis in comparison to the prior exams  Lack of contrast limits evaluation  No obvious fluid collection to suggest an abscess  Similar appearance of right external iliac chain lymphadenopathy        ASSESSMENT:   1  Sepsis from urinary tract source  2  Right hydronephrosis-with PCNU in place  3  Acute kidney injury  4  Uterine cancer with large right pelvic mass causing obstruction of the right ureter      PLAN:   -creatinine has improved overnight  -I asked interventional Radiology to send up a drainage bag and open the nephrostomy tube to drainage  -urology will continue to follow        Thank you for allowing me to participate in this patients care  Please do not hesitate to call with any additional questions    Iona Maynard PA-C

## 2019-12-23 NOTE — UTILIZATION REVIEW
Initial Clinical Review    Admission: Date/Time/Statement: Inpatient Admission Orders (From admission, onward)     Ordered        12/22/19 2343  Inpatient Admission  Once                   Orders Placed This Encounter   Procedures    Inpatient Admission     Standing Status:   Standing     Number of Occurrences:   1     Order Specific Question:   Admitting Physician     Answer:   Chan Ozuna [[id number]]     Order Specific Question:   Level of Care     Answer:   Med Surg [[id number]]     Order Specific Question:   Estimated length of stay     Answer:   More than 2 Midnights     Order Specific Question:   Certification     Answer:   I certify that inpatient services are medically necessary for this patient for a duration of greater than two midnights  See H&P and MD Progress Notes for additional information about the patient's course of treatment  ED Arrival Information     Expected Arrival Acuity Means of Arrival Escorted By Service Admission Type    - 12/22/2019 20:21 Urgent Ambulance [facility] Urgent    Arrival Complaint    Fever        Chief Complaint   Patient presents with    Fever - 9 weeks to 74 years     per ems - patient is chemo patient, last received 3 weeks ago, began today with fever and feeling "flushed"  Has nephrostomy tube and c/o pain in that area     Assessment/Plan:  59 y/o female with PMHx for recurrent endometrial cancer currently on carboplatin/Taxol chemo, R kidney hydronephrosis in this setting s/p right nephrostomy tube placement, h/o acute DVT anticoagulated on therapeutic Lovenox, anemia secondary to chemotherapy, and HTN who presents to ED with fever at home of 101 2 with generalized weakness, fatigue and persistent mild nonproductive cough  Also notes abdominal pain in the suprapubic area with increase in urgency  In ED tachycardic, UA (+) bacteria  procalcitonin elevated  CT a/p revealed worsening R-sided hydronephrosis   Cefepime IV given in ED, blood cxs   Admit inpatient to M/S unit with   Sepsis / Hydronephrosis / MONROE -- continue IV cefepime  Ttrend WBC's, procalcitonin, temps  Consult IR for potential nephrostomy exchange, NPO pending this procedure  Urology consult  Creatinine 1 12 (baseline 0 7-0 9), continue IVF  Urology consult 12/23 -- Sepsis -- I asked interventional Radiology to send up a drainage bag and open the nephrostomy tube to drainage  -urology will continue to follow      ED Triage Vitals [12/22/19 2026]   Temperature Pulse Respirations Blood Pressure SpO2   98 1 °F (36 7 °C) 96 20 101/55 97 %      Temp Source Heart Rate Source Patient Position - Orthostatic VS BP Location FiO2 (%)   Oral Monitor -- -- --      Pain Score       5        Wt Readings from Last 1 Encounters:   12/23/19 83 2 kg (183 lb 6 4 oz)     Additional Vital Signs:   Date/Time  Temp  Pulse  Resp  BP  MAP (mmHg)  SpO2  O2 Device   12/23/19 07:17:28  98 9 °F (37 2 °C)  78  18  93/51  65  98 %     12/23/19 0043            97 %  None (Room air)   12/23/19 00:35:04  99 7 °F (37 6 °C)  103    117/61  80  99 %     12/22/19 2246    83  16  105/57    100 %     12/22/19 2026  98 1 °F (36 7 °C)  96  20  101/55    97 %  None (Room air)       Pertinent Labs/Diagnostic Test Results:   Ct c/a p -- 12/22 -- Worsening right-sided hydronephrosis in comparison to the prior exams   Lack of contrast limits evaluation   No obvious fluid collection to suggest an abscess    Similar appearance of right external iliac chain lymphadenopathy    Results from last 7 days   Lab Units 12/23/19  0527 12/22/19 2103 12/18/19  1106   WBC Thousand/uL 11 80* 15 55* 10 55*   HEMOGLOBIN g/dL 8 4* 9 2* 10 2*   HEMATOCRIT % 26 3* 27 8* 31 6*   PLATELETS Thousands/uL 164 187 183   NEUTROS ABS Thousands/µL  --  12 64* 8 36*     Results from last 7 days   Lab Units 12/23/19  0527 12/22/19 2103 12/18/19  1106   SODIUM mmol/L 133* 133* 138   POTASSIUM mmol/L 3 6 3 5 4 0   CHLORIDE mmol/L 106 102 102   CO2 mmol/L 19* 19* 24   ANION GAP mmol/L 8 12 12   BUN mg/dL 36* 40* 29*   CREATININE mg/dL 1 44* 1 63* 1 12   EGFR ml/min/1 73sq m 39 34 53   CALCIUM mg/dL 9 5 9 8 10 1   MAGNESIUM mg/dL  --   --  1 7     Results from last 7 days   Lab Units 12/22/19 2103 12/18/19  1106   AST U/L 15 7   ALT U/L 20 16   ALK PHOS U/L 101 135*   TOTAL PROTEIN g/dL 8 0 8 0   ALBUMIN g/dL 3 1* 3 6   TOTAL BILIRUBIN mg/dL 0 27 0 23     Results from last 7 days   Lab Units 12/23/19  0527 12/22/19 2103 12/18/19  1106   GLUCOSE RANDOM mg/dL 99 104 116     Results from last 7 days   Lab Units 12/22/19 2103   PROTIME seconds 15 3*   INR  1 25*   PTT seconds 63*     Results from last 7 days   Lab Units 12/23/19 0527 12/22/19 2103   PROCALCITONIN ng/ml 0 75* 0 85*     Results from last 7 days   Lab Units 12/22/19 2103   LACTIC ACID mmol/L 0 9     Results from last 7 days   Lab Units 12/22/19 2114   CLARITY UA  Turbid   COLOR UA  Yellow   SPEC GRAV UA  1 014   PH UA  5 5   GLUCOSE UA mg/dl Negative   KETONES UA mg/dl Negative   BLOOD UA  Large*   PROTEIN UA mg/dl 100 (2+)*   NITRITE UA  Negative   BILIRUBIN UA  Negative   UROBILINOGEN UA E U /dl 0 2   LEUKOCYTES UA  Moderate*   WBC UA /hpf Innumerable*   RBC UA /hpf 20-30*   BACTERIA UA /hpf Innumerable*   EPITHELIAL CELLS WET PREP /hpf Occasional     Results from last 7 days   Lab Units 12/22/19 2104 12/22/19 2103   BLOOD CULTURE  Received in Microbiology Lab  Culture in Progress  Received in Microbiology Lab  Culture in Progress         ED Treatment:   Medication Administration from 12/22/2019 2021 to 12/23/2019 0027       Date/Time Order Dose Route Action     12/22/2019 2133 ondansetron (ZOFRAN) injection 4 mg 4 mg Intravenous Given     12/22/2019 2242 sodium chloride 0 9 % bolus 1,000 mL 1,000 mL Intravenous New Bag     12/22/2019 2246 cefepime (MAXIPIME) 2 g/50 mL dextrose IVPB 2,000 mg Intravenous New Bag     Past Medical History:   Diagnosis Date    Anemia     Chemotherapy follow-up examination     Depression     Diabetes mellitus ([place] [address] )     Endometrial cancer ([place] [address] ) 12/2017    Hyperglycemia     vx type 2 dm -- last assessed 4/1/14; resolved 11/7/17    Hyperlipidemia     Hypertension     Obesity     last assessed 10/14/17; resolved 11/7/17     Present on Admission:   Other hydronephrosis   Sepsis secondary to UTI [facility])   Endometrial cancer ([place] [address] )   Acute kidney injury ([place] [address] )   Acute deep vein thrombosis (DVT) of proximal vein of lower extremity (HCC)   Hyponatremia      Admitting Diagnosis: UTI (urinary tract infection) [N39 0]  Endometrial cancer (HCC) [C54 1]  Fever [R50 9]  Hydronephrosis of right kidney [N13 30]  Malfunction of nephrostomy tube ([place] [address] ) [T83 098A]  Age/Sex: 60 y o  female  Admission Orders:  Scheduled Medications:  Medications:  ARIPiprazole 10 mg Oral Daily   aspirin 81 mg Oral Daily   cefepime 2,000 mg Intravenous Q12H   cholecalciferol 1,000 Units Oral Daily   folic acid 1 mg Oral Daily   oxybutynin 10 mg Oral Daily   pantoprazole 40 mg Oral Early Morning   saccharomyces boulardii 250 mg Oral BID   venlafaxine 75 mg Oral TID     Continuous IV Infusions:  multi-electrolyte 100 mL/hr Intravenous Continuous     PRN Meds:  acetaminophen 650 mg Oral Q6H PRN     Reg diet, I/O's, SCD's, IS q 1h, daily wts    IP CONSULT TO UROLOGY  IP CONSULT TO GYNECOLOGIC ONCOLOGY    Network Utilization Review Department  [email or web address]  ATTENTION: Please call with any questions or concerns to [phone] and carefully listen to the prompts so that you are directed to the right person  All voicemails are confidential   Estelle James all requests for admission clinical reviews, approved or denied determinations and any other requests to dedicated fax number below belonging to the campus where the patient is receiving treatment   List of dedicated fax numbers for the Facilities:  FACILITY NAME UR FAX NUMBER   ADMISSION DENIALS (Administrative/Medical Necessity) [address] [facility] (Maternity/NICU/Pediatrics) [phone]   Hyun Walton [phone]   Mariya Sorianolaurel [phone]   Zaira Brody [phone]   [facility] [address] [facility] [phone]   Octaviano Jordan [phone]   [address] [facility] [address]  [address] [phone]

## 2019-12-23 NOTE — ASSESSMENT & PLAN NOTE
· Currently follows with [facility]; on carboplatin/Taxol chemotherapy     · Was due for infusion 12/23/2019 however will need to be delayed in setting of above apparent sepsis  · Gyn-Oncology following to determine timing for next cycle of chemotherapy

## 2019-12-23 NOTE — ASSESSMENT & PLAN NOTE
· POA, baseline appears 0 7-0 9 however was 1 12 12/18/2019  · Possibly in setting of worsening hydronephrosis and sepsis  · Cr improved from 1 63-> 1 44  · Will stop IVF hydration  · Management of hydronephrosis as above  · Hold nephrotoxins and avoid hypotension  · Monitor with BMP while inpatient

## 2019-12-23 NOTE — PLAN OF CARE
Problem: Potential for Falls  Goal: Patient will remain free of falls  Description  INTERVENTIONS:  - Assess patient frequently for physical needs  -  Identify cognitive and physical deficits and behaviors that affect risk of falls  -  [place] fall precautions as indicated by assessment   - Educate patient/family on patient safety including physical limitations  - Instruct patient to call for assistance with activity based on assessment  - Modify environment to reduce risk of injury  - Consider OT/PT consult to assist with strengthening/mobility  Outcome: Progressing     Problem: Prexisting or High Potential for Compromised Skin Integrity  Goal: Skin integrity is maintained or improved  Description  INTERVENTIONS:  - Identify patients at risk for skin breakdown  - Assess and monitor skin integrity  - Assess and monitor nutrition and hydration status  - Monitor labs   - Assess for incontinence   - Turn and reposition patient  - Assist with mobility/ambulation  - Relieve pressure over bony prominences  - Avoid friction and shearing  - Provide appropriate hygiene as needed including keeping skin clean and dry  - Evaluate need for skin moisturizer/barrier cream  - Collaborate with interdisciplinary team   - Patient/family teaching  - Consider wound care consult   Outcome: Progressing     Problem: Nutrition/Hydration-ADULT  Goal: Nutrient/Hydration intake appropriate for improving, restoring or maintaining nutritional needs  Description  Monitor and assess patient's nutrition/hydration status for malnutrition  Collaborate with interdisciplinary team and initiate plan and interventions as ordered  Monitor patient's weight and dietary intake as ordered or per policy  Utilize nutrition screening tool and intervene as necessary  Determine patient's food preferences and provide high-protein, high-caloric foods as appropriate       INTERVENTIONS:  - Monitor oral intake, urinary output, labs, and treatment plans  - Assess nutrition and hydration status and recommend course of action  - Evaluate amount of meals eaten  - Assist patient with eating if necessary   - Allow adequate time for meals  - Recommend/ encourage appropriate diets, oral nutritional supplements, and vitamin/mineral supplements  - Order, calculate, and assess calorie counts as needed  - Recommend, monitor, and adjust tube feedings and TPN/PPN based on assessed needs  - Assess need for intravenous fluids  - Provide specific nutrition/hydration education as appropriate  - Include patient/family/caregiver in decisions related to nutrition  Outcome: Progressing     Problem: PAIN - ADULT  Goal: Verbalizes/displays adequate comfort level or baseline comfort level  Description  Interventions:  - Encourage patient to monitor pain and request assistance  - Assess pain using appropriate pain scale  - Administer analgesics based on type and severity of pain and evaluate response  - Implement non-pharmacological measures as appropriate and evaluate response  - Consider cultural and social influences on pain and pain management  - Notify physician/advanced practitioner if interventions unsuccessful or patient reports new pain  Outcome: Progressing     Problem: INFECTION - ADULT  Goal: Absence or prevention of progression during hospitalization  Description  INTERVENTIONS:  - Assess and monitor for signs and symptoms of infection  - Monitor lab/diagnostic results  - Monitor all insertion sites, i e  indwelling lines, tubes, and drains  - Monitor endotracheal if appropriate and nasal secretions for changes in amount and color  - [place] appropriate cooling/warming therapies per order  - Administer medications as ordered  - Instruct and encourage patient and family to use good hand hygiene technique  - Identify and instruct in appropriate isolation precautions for identified infection/condition  Outcome: Progressing  Goal: Absence of fever/infection during neutropenic period  Description  INTERVENTIONS:  - Monitor WBC    Outcome: Progressing     Problem: SAFETY ADULT  Goal: Maintain or return to baseline ADL function  Description  INTERVENTIONS:  -  Assess patient's ability to carry out ADLs; assess patient's baseline for ADL function and identify physical deficits which impact ability to perform ADLs (bathing, care of mouth/teeth, toileting, grooming, dressing, etc )  - Assess/evaluate cause of self-care deficits   - Assess range of motion  - Assess patient's mobility; develop plan if impaired  - Assess patient's need for assistive devices and provide as appropriate  - Encourage maximum independence but intervene and supervise when necessary  - Involve family in performance of ADLs  - Assess for home care needs following discharge   - Consider OT consult to assist with ADL evaluation and planning for discharge  - Provide patient education as appropriate  Outcome: Progressing  Goal: Maintain or return mobility status to optimal level  Description  INTERVENTIONS:  - Assess patient's baseline mobility status (ambulation, transfers, stairs, etc )    - Identify cognitive and physical deficits and behaviors that affect mobility  - Identify mobility aids required to assist with transfers and/or ambulation (gait belt, sit-to-stand, lift, walker, cane, etc )  - [place] fall precautions as indicated by assessment  - Record patient progress and toleration of activity level on Mobility SBAR; progress patient to next Phase/Stage  - Instruct patient to call for assistance with activity based on assessment  - Consider rehabilitation consult to assist with strengthening/weightbearing, etc   Outcome: Progressing     Problem: DISCHARGE PLANNING  Goal: Discharge to home or other facility with appropriate resources  Description  INTERVENTIONS:  - Identify barriers to discharge w/patient and caregiver  - Arrange for needed discharge resources and transportation as appropriate  - Identify discharge learning needs (meds, wound care, etc )  - Arrange for interpretive services to assist at discharge as needed  - Refer to Case Management Department for coordinating discharge planning if the patient needs post-hospital services based on physician/advanced practitioner order or complex needs related to functional status, cognitive ability, or social support system  Outcome: Progressing     Problem: Depression - IP adult  Goal: Effects of depression will be minimized  Description  INTERVENTIONS:  - Assess impact of patient's symptoms on level of functioning, self-care needs and offer support as indicated  - Assess patient/family knowledge of depression, impact on illness and need for teaching  - Provide emotional support, presence and reassurance  - Assess for possible suicidal thoughts, ideation or self-harm   If patient expresses suicidal thoughts or statements do not leave alone, notify physician/AP immediately, initiate Suicide Precautions, and determine need for continual observation   - Initiate consults and referrals as appropriate (a mental health professional, Spiritual Care)  - Administer medication as ordered  Outcome: Progressing     Problem: SPIRITUAL CARE  Goal: Pt/Family able to move forward in process of forgiving self, others and/or higher power  Description  INTERVENTIONS:  - Assist patient with any spiritual needs/requests such as communion, confession, anointing, etc  - Explore guilt and help patient/family identify possible spiritual/cultural beliefs and values  - Explore possibilities of making amends & reconciliation with self, others, and/or a greater power  - Guide patient/family in identifying painful feelings  - Help patient explore and identify spiritual beliefs, cultural understandings or values that may help or hinder letting go of issue  - Help patient explore feelings of anger, bitterness, resentment, anxiety   Help patient/family identify and examine the situation in which these feelings are experienced  - Help patient/family identify destructive displacement of feelings onto other individuals  - Refer patient to formal counseling and/or to [place] community for further support as needed or per request  Outcome: Progressing  Goal: Patient feels balance and connection with others and/or higher power that empowers the self during times of loss, guilt and fear  Description  INTERVENTIONS:  - Create safety for patient through empathic presence and non-judgmental listening  - Encourage patient to explore his/her values, beliefs and/or spiritual images and practices  - Encourage use of breath work, imagery, meditation, relaxation, reiki to ease distress and provide healing  - Encourage use of cultural and spiritual celebrations and rituals  - Facilitate discussion that helps patient sort out spiritual concerns  - Help patient identify where meaning/hope/comfort & strength are in his/her life  - Refer patient to [place] community for assistance, as appropriate  - Respond to patient/family need for prayer/ritual/sacrament/ceremony  Outcome: Progressing

## 2019-12-23 NOTE — PLAN OF CARE
Problem: Potential for Falls  Goal: Patient will remain free of falls  Description  INTERVENTIONS:  - Assess patient frequently for physical needs  -  Identify cognitive and physical deficits and behaviors that affect risk of falls  -  [place] fall precautions as indicated by assessment   - Educate patient/family on patient safety including physical limitations  - Instruct patient to call for assistance with activity based on assessment  - Modify environment to reduce risk of injury  - Consider OT/PT consult to assist with strengthening/mobility  Outcome: Progressing     Problem: Prexisting or High Potential for Compromised Skin Integrity  Goal: Skin integrity is maintained or improved  Description  INTERVENTIONS:  - Identify patients at risk for skin breakdown  - Assess and monitor skin integrity  - Assess and monitor nutrition and hydration status  - Monitor labs   - Assess for incontinence   - Turn and reposition patient  - Assist with mobility/ambulation  - Relieve pressure over bony prominences  - Avoid friction and shearing  - Provide appropriate hygiene as needed including keeping skin clean and dry  - Evaluate need for skin moisturizer/barrier cream  - Collaborate with interdisciplinary team   - Patient/family teaching  - Consider wound care consult   Outcome: Progressing     Problem: Nutrition/Hydration-ADULT  Goal: Nutrient/Hydration intake appropriate for improving, restoring or maintaining nutritional needs  Description  Monitor and assess patient's nutrition/hydration status for malnutrition  Collaborate with interdisciplinary team and initiate plan and interventions as ordered  Monitor patient's weight and dietary intake as ordered or per policy  Utilize nutrition screening tool and intervene as necessary  Determine patient's food preferences and provide high-protein, high-caloric foods as appropriate       INTERVENTIONS:  - Monitor oral intake, urinary output, labs, and treatment plans  - Assess nutrition and hydration status and recommend course of action  - Evaluate amount of meals eaten  - Assist patient with eating if necessary   - Allow adequate time for meals  - Recommend/ encourage appropriate diets, oral nutritional supplements, and vitamin/mineral supplements  - Order, calculate, and assess calorie counts as needed  - Recommend, monitor, and adjust tube feedings and TPN/PPN based on assessed needs  - Assess need for intravenous fluids  - Provide specific nutrition/hydration education as appropriate  - Include patient/family/caregiver in decisions related to nutrition  Outcome: Progressing     Problem: PAIN - ADULT  Goal: Verbalizes/displays adequate comfort level or baseline comfort level  Description  Interventions:  - Encourage patient to monitor pain and request assistance  - Assess pain using appropriate pain scale  - Administer analgesics based on type and severity of pain and evaluate response  - Implement non-pharmacological measures as appropriate and evaluate response  - Consider cultural and social influences on pain and pain management  - Notify physician/advanced practitioner if interventions unsuccessful or patient reports new pain  Outcome: Progressing     Problem: INFECTION - ADULT  Goal: Absence or prevention of progression during hospitalization  Description  INTERVENTIONS:  - Assess and monitor for signs and symptoms of infection  - Monitor lab/diagnostic results  - Monitor all insertion sites, i e  indwelling lines, tubes, and drains  - Monitor endotracheal if appropriate and nasal secretions for changes in amount and color  - [place] appropriate cooling/warming therapies per order  - Administer medications as ordered  - Instruct and encourage patient and family to use good hand hygiene technique  - Identify and instruct in appropriate isolation precautions for identified infection/condition  Outcome: Progressing  Goal: Absence of fever/infection during neutropenic period  Description  INTERVENTIONS:  - Monitor WBC    Outcome: Progressing     Problem: SAFETY ADULT  Goal: Maintain or return to baseline ADL function  Description  INTERVENTIONS:  -  Assess patient's ability to carry out ADLs; assess patient's baseline for ADL function and identify physical deficits which impact ability to perform ADLs (bathing, care of mouth/teeth, toileting, grooming, dressing, etc )  - Assess/evaluate cause of self-care deficits   - Assess range of motion  - Assess patient's mobility; develop plan if impaired  - Assess patient's need for assistive devices and provide as appropriate  - Encourage maximum independence but intervene and supervise when necessary  - Involve family in performance of ADLs  - Assess for home care needs following discharge   - Consider OT consult to assist with ADL evaluation and planning for discharge  - Provide patient education as appropriate  Outcome: Progressing  Goal: Maintain or return mobility status to optimal level  Description  INTERVENTIONS:  - Assess patient's baseline mobility status (ambulation, transfers, stairs, etc )    - Identify cognitive and physical deficits and behaviors that affect mobility  - Identify mobility aids required to assist with transfers and/or ambulation (gait belt, sit-to-stand, lift, walker, cane, etc )  - [place] fall precautions as indicated by assessment  - Record patient progress and toleration of activity level on Mobility SBAR; progress patient to next Phase/Stage  - Instruct patient to call for assistance with activity based on assessment  - Consider rehabilitation consult to assist with strengthening/weightbearing, etc   Outcome: Progressing     Problem: DISCHARGE PLANNING  Goal: Discharge to home or other facility with appropriate resources  Description  INTERVENTIONS:  - Identify barriers to discharge w/patient and caregiver  - Arrange for needed discharge resources and transportation as appropriate  - Identify discharge learning needs (meds, wound care, etc )  - Arrange for interpretive services to assist at discharge as needed  - Refer to Case Management Department for coordinating discharge planning if the patient needs post-hospital services based on physician/advanced practitioner order or complex needs related to functional status, cognitive ability, or social support system  Outcome: Progressing     Problem: Depression - IP adult  Goal: Effects of depression will be minimized  Description  INTERVENTIONS:  - Assess impact of patient's symptoms on level of functioning, self-care needs and offer support as indicated  - Assess patient/family knowledge of depression, impact on illness and need for teaching  - Provide emotional support, presence and reassurance  - Assess for possible suicidal thoughts, ideation or self-harm   If patient expresses suicidal thoughts or statements do not leave alone, notify physician/AP immediately, initiate Suicide Precautions, and determine need for continual observation   - Initiate consults and referrals as appropriate (a mental health professional, Spiritual Care)  - Administer medication as ordered  Outcome: Progressing     Problem: SPIRITUAL CARE  Goal: Pt/Family able to move forward in process of forgiving self, others and/or higher power  Description  INTERVENTIONS:  - Assist patient with any spiritual needs/requests such as communion, confession, anointing, etc  - Explore guilt and help patient/family identify possible spiritual/cultural beliefs and values  - Explore possibilities of making amends & reconciliation with self, others, and/or a greater power  - Guide patient/family in identifying painful feelings  - Help patient explore and identify spiritual beliefs, cultural understandings or values that may help or hinder letting go of issue  - Help patient explore feelings of anger, bitterness, resentment, anxiety   Help patient/family identify and examine the situation in which these feelings are experienced  - Help patient/family identify destructive displacement of feelings onto other individuals  - Refer patient to formal counseling and/or to [place] community for further support as needed or per request  Outcome: Progressing  Goal: Patient feels balance and connection with others and/or higher power that empowers the self during times of loss, guilt and fear  Description  INTERVENTIONS:  - Create safety for patient through empathic presence and non-judgmental listening  - Encourage patient to explore his/her values, beliefs and/or spiritual images and practices  - Encourage use of breath work, imagery, meditation, relaxation, reiki to ease distress and provide healing  - Encourage use of cultural and spiritual celebrations and rituals  - Facilitate discussion that helps patient sort out spiritual concerns  - Help patient identify where meaning/hope/comfort & strength are in his/her life  - Refer patient to [place] community for assistance, as appropriate  - Respond to patient/family need for prayer/ritual/sacrament/ceremony  Outcome: Progressing

## 2019-12-23 NOTE — ASSESSMENT & PLAN NOTE
· POA with elevated heart rate and leukocytosis; patient had reported fever at home  Suspected in setting of UTI with hydronephrosis additionally noted  Additionally with elevated procalcitonin    · Fortunately she is hemodynamically stable and nontoxic appearing at this time  · Received IV cefepime in ED and will continue for now; patient does have cephalosporin allergy noted but has tolerated cefepime in past and appears to have tolerated 1st dose in ED  · Follow up results of blood cultures x2, urine culture  · Management of right nephrostomy as below  · Trend WBC, temperature curve, hemodynamics, procalcitonin

## 2019-12-23 NOTE — ASSESSMENT & PLAN NOTE
· POA, with tachycardia, leukocytosis, elevated procalcitonin; patient had reported fever at home    Suspected in setting of UTI with hydronephrosis    · Fortunately she is hemodynamically stable and nontoxic appearing at this time  · Received IV cefepime in ED and will continue for now; patient does have cephalosporin allergy noted but has tolerated cefepime in past and appears to have tolerated 1st dose in ED  · Follow up results of blood cultures x2, urine culture  · Management of right nephrostomy as below  · Trend WBC, temperature curve, hemodynamics, procalcitonin

## 2019-12-23 NOTE — PROGRESS NOTES
Progress Note - Wendy Melendez 1955, 60 y o  female MRN: [id number]    [facility] Encounter: [id number]    Primary Care Provider: Floridalma Keyes DO   Date and time admitted to hospital: 12/22/2019  8:21 PM        * Sepsis secondary to UTI [facility])  Assessment & Plan  · POA, with tachycardia, leukocytosis, elevated procalcitonin; patient had reported fever at home    Suspected in setting of UTI with hydronephrosis    · Fortunately she is hemodynamically stable and nontoxic appearing at this time  · Received IV cefepime in ED and will continue for now; patient does have cephalosporin allergy noted but has tolerated cefepime in past and appears to have tolerated 1st dose in ED  · Follow up results of blood cultures x2, urine culture  · Management of right nephrostomy as below  · Trend WBC, temperature curve, hemodynamics, procalcitonin    Other hydronephrosis  Assessment & Plan  · In setting of endometrial cancer, with right nephrostomy  · Unfortunately CT abdomen/pelvis with worsening hydronephrosis  · Urology following, appreciate input  · Urology recommended opening nephrostomy tube to drainage bag   · No need for IR consultation   · Treatment of suspected UTI as above    Acute kidney injury [facility])  Assessment & Plan  · POA, baseline appears 0 7-0 9 however was 1 12 12/18/2019  · Possibly in setting of worsening hydronephrosis and sepsis  · Cr improved from 1 63-> 1 44  · Will stop IVF hydration  · Management of hydronephrosis as above  · Hold nephrotoxins and avoid hypotension  · Monitor with BMP while inpatient    Hyponatremia  Assessment & Plan  · Mild, possibly in setting of decreased oral intake  · Na 133 -> 133  · Monitor BMP in am     Acute deep vein thrombosis (DVT) of proximal vein of lower extremity ([address] )  Assessment & Plan  · On therapeutic Lovenox at home  · Held on admission for possible PCN exchange  · NO intervention planned, therefore, ok to resume Lovenox     Endometrial cancer [facility])  Assessment & Plan  · Currently follows with [facility]; on carboplatin/Taxol chemotherapy  · Was due for infusion 2019 however will need to be delayed in setting of above apparent sepsis  · Gyn-Oncology following to determine timing for next cycle of chemotherapy      VTE Pharmacologic Prophylaxis:   Pharmacologic: low risk, ambulatory  Mechanical VTE Prophylaxis in Place: Yes    Patient Centered Rounds: I have performed bedside rounds with nursing staff today  Discussions with Specialists or Other Care Team Provider: Nursing, CM, appreciate urology note    Education and Discussions with Family / Patient: I have answered all questions to the best of my ability  Family at bedside  Time Spent for Care: 20 minutes  More than 50% of total time spent on counseling and coordination of care as described above  Current Length of Stay: 1 day(s)    Current Patient Status: Inpatient   Certification Statement: The patient will continue to require additional inpatient hospital stay due to [place], hydronephrosis, UTI    Discharge Plan: Likely tomorrow vs Wednesday pending cultures and Cr     Code Status: Level 1 - Full Code      Subjective:   Overall, patient feels slightly improved  She is tolerating her right PCN open to drainage bag  She states she would like to go home with a drainage bag to avoid future hydronephrosis  She does not have any abdominal pain, nausea, vomiting, or diarrhea  She does not have any chest pain or shortness of breath  She is a little discouraged as today was supposed to be her last day of chemotherapy  Objective:     Vitals:   Temp (24hrs), Av 8 °F (37 1 °C), Min:98 1 °F (36 7 °C), Max:99 7 °F (37 6 °C)    Temp:  [98 1 °F (36 7 °C)-99 7 °F (37 6 °C)] 98 3 °F (36 8 °C)  HR:  [] 79  Resp:  [16-20] 20  BP: ()/(51-69) 102/69  SpO2:  [97 %-100 %] 98 %  Body mass index is 37 04 kg/m²       Input and Output Summary (last 24 hours): Intake/Output Summary (Last 24 hours) at 12/23/2019 1622  Last data filed at 12/23/2019 1501  Gross per 24 hour   Intake 2446 67 ml   Output 1600 ml   Net 846 67 ml       Physical Exam:     Physical Exam   Constitutional: She is oriented to person, place, and time  She appears well-developed  No distress  HENT:   Head: Normocephalic  Neck: Normal range of motion  Cardiovascular: Normal rate and intact distal pulses  Pulmonary/Chest: Effort normal and breath sounds normal  No respiratory distress  She has no wheezes  She has no rhonchi  She has no rales  Abdominal: Soft  Bowel sounds are normal  She exhibits no distension  There is no tenderness  Genitourinary:   Genitourinary Comments: Right PCN draining cloudy yellow urine to drainage bag   Musculoskeletal: Normal range of motion  She exhibits no edema  Neurological: She is alert and oriented to person, place, and time  Skin: Skin is warm and dry  Capillary refill takes less than 2 seconds  She is not diaphoretic  There is pallor  Psychiatric: She has a normal mood and affect  Judgment normal    Nursing note and vitals reviewed  Additional Data:     Labs:    Results from last 7 days   Lab Units 12/23/19  0527 12/22/19 2103   WBC Thousand/uL 11 80* 15 55*   HEMOGLOBIN g/dL 8 4* 9 2*   HEMATOCRIT % 26 3* 27 8*   PLATELETS Thousands/uL 164 187   NEUTROS PCT %  --  81*   LYMPHS PCT %  --  8*   MONOS PCT %  --  10   EOS PCT %  --  0     Results from last 7 days   Lab Units 12/23/19  0527 12/22/19  2103   POTASSIUM mmol/L 3 6 3 5   CHLORIDE mmol/L 106 102   CO2 mmol/L 19* 19*   BUN mg/dL 36* 40*   CREATININE mg/dL 1 44* 1 63*   CALCIUM mg/dL 9 5 9 8   ALK PHOS U/L  --  101   ALT U/L  --  20   AST U/L  --  15     Results from last 7 days   Lab Units 12/22/19  2103   INR  1 25*       * I Have Reviewed All Lab Data Listed Above  * Additional Pertinent Lab Tests Reviewed:  All Labs Within Last 24 Hours Reviewed    Imaging:    Imaging Reports Reviewed Today Include: CT C/A/P  Imaging Personally Reviewed by Myself Includes:  None    Recent Cultures (last 7 days):     Results from last 7 days   Lab Units 12/22/19 2104 12/22/19 2103   BLOOD CULTURE  Received in Microbiology Lab  Culture in [facility]  Received in Microbiology Lab  Culture in Progress  Last 24 Hours Medication List:     Current Facility-Administered Medications:  acetaminophen 650 mg Oral Q6H PRN March Gubler, DO    ARIPiprazole 10 mg Oral Daily March Gubler, DO    aspirin 81 mg Oral Daily March Gubler, DO    cefepime 2,000 mg Intravenous Q12H March Gubler, DO Last Rate: 2,000 mg (12/23/19 1101)   cholecalciferol 1,000 Units Oral Daily March Sudhar DO    [START ON 12/24/2019] enoxaparin 1 5 mg/kg Subcutaneous Q24H [address] RAFITA Liao    folic acid 1 mg Oral Daily March Gubler, DO    mupirocin  Topical Daily RAFITA Liao    oxybutynin 10 mg Oral Daily March Gubler, DO    pantoprazole 40 mg Oral Early Morning March Gubler, DO    saccharomyces boulardii 250 mg Oral BID March Gubler, DO    venlafaxine 75 mg Oral TID March Corbinbler, DO         Today, Patient Was Seen By: RAFITA Liao    ** Please Note: Dictation voice to text software may have been used in the creation of this document   **

## 2019-12-23 NOTE — ASSESSMENT & PLAN NOTE
· In setting of endometrial cancer, with right nephrostomy  · Unfortunately CT abdomen/pelvis with worsening hydronephrosis  · Urology following, appreciate input  · Urology recommended opening nephrostomy tube to drainage bag   · No need for IR consultation   · Treatment of suspected UTI as above

## 2019-12-23 NOTE — ASSESSMENT & PLAN NOTE
· Currently follows with [facility]; on carboplatin/Taxol chemotherapy  · Was due for infusion 12/23/2019 however will need to be delayed in setting of above apparent sepsis  · Follow-up with gyn-Oncology to determine timing for next cycle of chemotherapy

## 2019-12-23 NOTE — H&P
H&P- Katiuska Nobles Pulkareni 1955, 60 y o  female MRN: [id number]    Unit/Bed#: [facility] 928-01 Encounter: [id number]    Primary Care Provider: Torri Diez DO   Date and time admitted to hospital: 12/22/2019  8:21 PM        * Sepsis secondary to UTI [facility])  Assessment & Plan  · POA with elevated heart rate and leukocytosis; patient had reported fever at home  Suspected in setting of UTI with hydronephrosis additionally noted  Additionally with elevated procalcitonin    · Fortunately she is hemodynamically stable and nontoxic appearing at this time  · Received IV cefepime in ED and will continue for now; patient does have cephalosporin allergy noted but has tolerated cefepime in past and appears to have tolerated 1st dose in ED  · Follow up results of blood cultures x2, urine culture  · Management of right nephrostomy as below  · Trend WBC, temperature curve, hemodynamics, procalcitonin    Other hydronephrosis  Assessment & Plan  · In setting of endometrial cancer, with right nephrostomy  · Unfortunately CT abdomen/pelvis with worsening hydronephrosis  · Will request IR consultation for potential nephrostomy exchange, NPO pending this procedure  · Additionally will request Urology inputs for further management following exchange  · Treatment of suspected UTI as above    Acute kidney injury [facility])  Assessment & Plan  · POA, baseline appears 0 7-0 9 however was 1 12 12/18/2019  · Possibly in setting of worsening hydronephrosis and sepsis  · Continue IVF hydration  · Management of hydronephrosis as above  · Hold nephrotoxins and avoid hypotension  · Monitor with BMP while inpatient    Endometrial cancer [facility])  Assessment & Plan  · Currently [facility]; on carboplatin/Taxol chemotherapy  · Was due for infusion 12/23/2019 however will need to be delayed in setting of above apparent sepsis  · Follow-up with gyn-Oncology to determine timing for next cycle of chemotherapy    Hyponatremia  Assessment & Plan  · Mild, possibly in setting of decreased oral intake  · IVF hydration overnight, monitor with BMP    Acute deep vein thrombosis (DVT) of proximal vein of lower extremity ([facility])  Assessment & Plan  · On therapeutic Lovenox; will temporarily hold pending replacement of right nephrostomy tube, likely to resume postprocedure      VTE Prophylaxis: Pharmacologic VTE Prophylaxis contraindicated due to Holding for anticipated procedure, normally on therapeutic Lovenox  / sequential compression device   Code Status: Level 1 - Full Code as discussed with patient at bedside  POLST: POLST form is not discussed and not completed at this time  Anticipated Length of Stay:  Patient will be admitted on an Inpatient basis with an anticipated length of stay of  greater than 2 midnights  Justification for Hospital Stay: Please see detailed plans noted above  Chief Complaint:     Fever and generalized weakness  History of Present Illness:  Isabelle Wilson is a 60 y o  female who has a past medical history significant for recurrent endometrial cancer currently on carboplatin/Taxol chemotherapy, right kidney hydronephrosis in this setting s/p right nephrostomy tube placement, history of acute DVT anticoagulated on therapeutic Lovenox, anemia secondary to chemotherapy, hypertension  She presents following the onset of fever reported at home as high as 101 2° F at 1700H 12/22/2019  She had noted additionally for the past several days generalized weakness and fatigue with persistence of mild nonproductive cough  She additionally noted some abdominal pain in the suprapubic area with slight increase in urgency to urination but denied miguel angel dysuria, and of note was apparently told she had increased erythema around right nephrostomy site    Found during ED evaluation with leukocytosis, acute kidney injury, and UA with significant pyuria and bacteriuria; a subsequent CT chest/abdomen/pelvis revealed worsening right-sided hydronephrosis without clear fluid collection to suggest abscess  In this setting ED physician discussed case with Urology on call, who advised IR consultation for likely nephrostomy tube replacement, however patient additionally is admitted for further evaluation/treatment of presumed sepsis  Currently, patient is lying in bed comfortably and no acute distress  Does note some intermittent chills and persistent weakness, but offers no other complaints aside from previously mentioned      Review of Systems:    Constitutional:  Endorses fever, chills, fatigue  Eyes:  Denies change in visual acuity   HENT:  Denies nasal congestion or sore throat   Respiratory:  Denies shortness of breath but endorses cough  Cardiovascular:  Denies chest pain or edema   GI:  Denies abdominal pain, nausea, vomiting, bloody stools or diarrhea   :  Denies dysuria but endorses urinary urgency  Musculoskeletal:  Denies back pain or joint pain   Integument:  Denies rash   Neurologic:  Denies headache, focal weakness or sensory changes   Endocrine:  Denies polyuria or polydipsia   Lymphatic:  Denies swollen glands   Psychiatric:  Denies depression or anxiety     Past Medical and Surgical History:   Past Medical History:   Diagnosis Date    Anemia     Chemotherapy follow-up examination     Depression     Diabetes mellitus [address] [address]     Endometrial cancer [address] [address] ) 12/2017    Hyperglycemia     vx type 2 dm -- last assessed 4/1/14; resolved 11/7/17    Hyperlipidemia     Hypertension     Obesity     last assessed 10/14/17; resolved 11/7/17     Past Surgical History:   Procedure Laterality Date    ABDOMINAL SURGERY      GASTRIC BYPASS    CHOLECYSTECTOMY      at the time of gastric bypass    COLONOSCOPY      CT NEEDLE BIOPSY LYMPH NODE  7/8/2019    FL GUIDED CENTRAL VENOUS ACCESS DEVICE INSERTION  11/12/2019    GASTRIC BYPASS      HYSTERECTOMY Bilateral     total abdominal with salpingo-oophorectomy    IR PICC LINE  9/27/2019    IR PORT PLACEMENT  7/26/2019    IR PORT REMOVAL  9/20/2019    IR TUBE PLACEMENT NEPHROSTOMY  7/26/2019    OOPHORECTOMY Bilateral     NV INSJ TUNNELED CTR VAD W/SUBQ PORT AGE 5 YR/> Left 11/12/2019    Procedure: INSERTION VENOUS PORT ( PORT-A-CATH) IR;  Surgeon: Jessie Ching DO;  [facility];  Service: Interventional Radiology    NV LAP, RADICAL HYST W/ TUBE&OV, NODE BX N/A 12/19/2017    Procedure: HYSTERECTOMY LAPAROSCOPIC TOTAL ([address]) W/ ROBOTICS; BILATERAL SALPINGOOPHERECTOMY; LYMPH NODE DISSECTION; lysis of adhesions;  Surgeon: Juan Carlos Boston MD;  [facility]: [facility];  Service: Gynecology Oncology    NV LAP,DIAGNOSTIC ABDOMEN N/A 12/19/2017    Procedure: LAPAROSCOPY DIAGNOSTIC;  Surgeon: Juan Carlos Boston MD;  [facility];  Service: Gynecology Oncology    TONSILLECTOMY      US GUIDED BREAST BIOPSY RIGHT COMPLETE Right 6/28/2019       Meds/Allergies:  Medications Prior to Admission   Medication    acetaminophen (TYLENOL) 325 mg tablet    ARIPiprazole (ABILIFY) 10 mg tablet    aspirin (ECOTRIN LOW STRENGTH) 81 mg EC tablet    cholecalciferol (VITAMIN D3) 1,000 units tablet    enoxaparin (LOVENOX) 120 OD/2 9 mL    folic acid (FOLVITE) 1 mg tablet    gemfibrozil (LOPID) 600 mg tablet    lisinopril (ZESTRIL) 5 mg tablet    MYRBETRIQ 50 MG TB24    omeprazole (PriLOSEC) 20 mg delayed release capsule    ondansetron (ZOFRAN) 8 mg tablet    quinapril (ACCUPRIL) 5 mg tablet    saccharomyces boulardii (FLORASTOR) 250 mg capsule    venlafaxine (EFFEXOR) 75 mg tablet    enoxaparin (LOVENOX) 100 mg/mL    LORazepam (ATIVAN) 1 mg tablet    Pegfilgrastim (NEULASTA DELIVERY KIT SC)    potassium chloride (KLOR-CON) 20 mEq packet       Allergies:    Allergies   Allergen Reactions    Cephalosporins Rash     Which Cephalosporin reaction was to not specified; however, has tolerated Amoxicillin, Cefazolin, and Cefepime History:  Marital Status: Single     Substance Use History:   Social History     Substance and Sexual Activity   Alcohol Use Not Currently     Social History     Tobacco Use   Smoking Status Never Smoker   Smokeless Tobacco Never Used     Social History     Substance and Sexual Activity   Drug Use No       Family History:  Family History   Problem Relation Age of Onset    Other Mother         dyslipidemia    Ovarian cancer Mother 48    Lymphoma Father     Bone cancer Maternal Grandfather     No Known Problems Sister     No Known Problems Maternal Grandmother     No Known Problems Paternal Grandmother     No Known Problems Paternal Grandfather     No Known Problems Maternal Aunt     No Known Problems Maternal Aunt     No Known Problems Maternal Aunt     No Known Problems Maternal Aunt     No Known Problems Paternal Aunt     No Known Problems Paternal Aunt     No Known Problems Paternal Aunt     No Known Problems Paternal Aunt     No Known Problems Brother     No Known Problems Brother        Physical Exam:     Vitals:   Blood Pressure: 117/61 (12/23/19 0035)  Pulse: 103 (12/23/19 0035)  Temperature: 99 7 °F (37 6 °C) (12/23/19 0035)  Temp Source: Oral (12/22/19 2026)  Respirations: 16 (12/22/19 2246)  Height: 4' 11" (149 9 cm) (12/23/19 0055)  Weight - Scale: 83 2 kg (183 lb 6 4 oz) (12/23/19 0055)  SpO2: 97 % (12/23/19 0043)    Constitutional:  Well developed, well nourished, no acute distress, non-toxic appearance   Eyes:  PERRL, conjunctiva normal   HENT:  Atraumatic, external ears normal, nose normal, oropharynx moist, no pharyngeal exudates   Neck- normal range of motion, no tenderness, supple   Respiratory:  No respiratory distress, normal breath sounds, no rales, no wheezing   Cardiovascular:  Normal rate, normal rhythm, no murmurs, no gallops, no rubs   GI:  Soft, nondistended, normal bowel sounds, mild discomfort to palpation in suprapubic area, no organomegaly, no mass, no rebound, no guarding   :  No costovertebral angle tenderness   Musculoskeletal:  No edema, no tenderness, no deformities  Back- no tenderness  Integument:  Well hydrated, no rash, right nephrostomy tube with minimal surrounding erythema; right chest port non-tender to palpation and without overlying erythema   Lymphatic:  No lymphadenopathy noted   Neurologic:  Alert &awake, communicative, CN 2-12 normal, normal motor function, normal sensory function, no focal deficits noted   Psychiatric:  Speech and behavior appropriate       Lab Results: I have personally reviewed pertinent reports  Results from last 7 days   Lab Units 12/22/19  2103   WBC Thousand/uL 15 55*   HEMOGLOBIN g/dL 9 2*   HEMATOCRIT % 27 8*   PLATELETS Thousands/uL 187   NEUTROS PCT % 81*   LYMPHS PCT % 8*   MONOS PCT % 10   EOS PCT % 0     Results from last 7 days   Lab Units 12/22/19  2103   POTASSIUM mmol/L 3 5   CHLORIDE mmol/L 102   CO2 mmol/L 19*   BUN mg/dL 40*   CREATININE mg/dL 1 63*   CALCIUM mg/dL 9 8   ALK PHOS U/L 101   ALT U/L 20   AST U/L 15     Results from last 7 days   Lab Units 12/22/19  2103   INR  1 25*       EKG:  Normal sinus rhythm    Imaging: I have personally reviewed pertinent reports  Ct Chest Abdomen Pelvis Wo Contrast    Result Date: 12/22/2019  Narrative: CT CHEST, ABDOMEN AND PELVIS WITHOUT IV CONTRAST INDICATION:   concern for fluid collection, right nephrostomy tube pain, concern for mets  COMPARISON:  10/22/2019, CT abdomen pelvis  CT chest abdomen pelvis dated 9/21/2019 TECHNIQUE: CT examination of the chest, abdomen and pelvis was performed without intravenous contrast   Axial, sagittal, and coronal 2D reformatted images were created from the source data and submitted for interpretation  Radiation dose length product (DLP) for this visit:  841 79 mGy-cm     This examination, like all CT scans performed in the [facility], was performed utilizing techniques to minimize radiation dose exposure, including the use of iterative  reconstruction and automated exposure control  Enteric contrast was not administered  FINDINGS: CHEST: Left IJ approach central venous catheter with peripheral injection port seen with its tip in the region of the SVC and the right atrium    LUNGS: Lingular scarring/subsegmental atelectasis  Lungs are otherwise clear  There is no tracheal or endobronchial lesion  PLEURA:  Unremarkable  HEART/GREAT VESSELS:  Unremarkable for patient's age  MEDIASTINUM AND KATIANA:  Unremarkable  CHEST WALL AND LOWER NECK:   Unremarkable  ABDOMEN LIVER/BILIARY TREE:  Unremarkable  GALLBLADDER:  Gallbladder is surgically absent  SPLEEN:  Unremarkable  PANCREAS:  Unremarkable  ADRENAL GLANDS:  Unremarkable  KIDNEYS/URETERS:  Percutaneous right nephroureteral catheter is noted  There is right hydronephrosis which appears more severe than on the prior exam  Simple Renal cysts are again noted  STOMACH AND BOWEL:  Post gastric bypass  APPENDIX:  No findings to suggest appendicitis  ABDOMINOPELVIC CAVITY:  No ascites or free intraperitoneal air  No lymphadenopathy  VESSELS:  Unremarkable for patient's age  PELVIS REPRODUCTIVE ORGANS:  Unremarkable for patient's age  URINARY BLADDER:  Unremarkable  ABDOMINAL WALL/INGUINAL REGIONS:  Subcutaneous infiltration are again noted in the intra-abdominal wall likely secondary to injection of low molecular weight heparin  Recommend correlation  Stable right external iliac chain adenopathy measuring up to 1 7 cm in short axis OSSEOUS STRUCTURES:  No acute fracture or destructive osseous lesion  Impression: Worsening right-sided hydronephrosis in comparison to the prior exams  Lack of contrast limits evaluation  No obvious fluid collection to suggest an abscess  Similar appearance of right external iliac chain lymphadenopathy   Workstation performed: [id number]         ** Please Note: Dragon 360 Dictation voice to text software was used in the creation of this document   **

## 2019-12-23 NOTE — PROGRESS NOTES
Patient requesting pastoral care, Orthod[facility], to visit  Writer called and left a message on the machine

## 2019-12-23 NOTE — SOCIAL WORK
Met with pt and discussed role of CM  Pt lives with her sister in a 2-story home without steps at entrance  Pt is independent in ADLs  Pt has DME including: cane and RW  Pt has hx [facility]  Preference for pharmacy is [facility] in [address]  Pt has hx depression--no inpt psych admissions  No D&A tx hx  PCP- Roxy Rowe DO ([phone])  No POA  Main contact: Sister- Tommy Thomas ([phone])  Notified pt that PCP office will call within 3 business days for f/u appt post-d/c  Meds-to-beds program reviewed and encouraged  Access to care and care now information sheets reviewed and provided to pt  CM reviewed d/c planning process including the following: identifying help at home, patient preference for d/c planning needs, Discharge Lounge, [facility] Meds to Bed program, availability of treatment team to discuss questions or concerns patient and/or family may have regarding understanding medications and recognizing signs and symptoms once discharged  CM also encouraged patient to follow up with all recommended appointments after discharge  Patient advised of importance for patient and family to participate in managing patients medical well being  Patient/caregiver received discharge checklist  Content reviewed  Patient/caregiver encouraged to participate in discharge plan of care prior to discharge home

## 2019-12-23 NOTE — ASSESSMENT & PLAN NOTE
· POA, baseline appears 0 7-0 9 however was 1 12 12/18/2019  · Possibly in setting of worsening hydronephrosis and sepsis  · Continue IVF hydration  · Management of hydronephrosis as above  · Hold nephrotoxins and avoid hypotension  · Monitor with BMP while inpatient

## 2019-12-23 NOTE — CONSULTS
Consult - OB/GYN   Filippo Melendez 60 y o  female MRN: [id number]  Unit/Bed#: [facility] 928-01 Encounter: [id number]      Cami Fish is a patient of Dr Mandeep Allen  Chief complaint:  Fever and back pain    HPI:  Ms Maggie Causey is a 59 yo female with recurrent stage IB1 endometrial cancer undergoing chemotherapy on Carboplatin and Taxol and history of right nephrostomy tube placement who presented to hospital with fever at home and back pain  Patient was admitted to hospital for sepsis in setting of UTI and worsening hydronephrosis following PCN capping on 11/27/19  Patient was seen at bedside resting comfortably  She reports she began having fevers yesterday of 101 2F at home and presented for further evaluation  She denies cough, chest pain, shortness of breath, nausea, vomiting or diarrhea  She does report some fatigue but this is not a new finding for her  Patient has a previous admission in 11/2019 for sepsis in setting of MSSA bacteremia secondary to port contamination and replacement  She is anticoagulated on therapeutic Lovenox for h/o DVT and additionally has received multiple transfusions secondary to chronic anemia secondary to chronic disease and chemotherapy  Her previous therapy history is as follows per office visit note on 12/19/19:  Endometrial cancer [facility])     11/17/2017 Initial Diagnosis       Endometrial cancer [facility])        12/19/2017 Surgery       Robotic assisted total laparoscopic hysterectomy with bilateral salpingo-oophorectomy and sentinel bilateral pelvic lymph node dissection   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 (4 4 x 3 2 cm tumor, 9 4/15 4mm invasion, NO LVSI, washings revealed atypical cellular changes)        12/19/2017 Genetic Testing       Morrison testing negative        7/8/2019 Recurrence       Presented with right lower extremity DVT and CT demonstrating right pelvic sidewall mass with venous, ureteral and nerve compression causing significant neuropathic pain  Core biopsy demonstrates high-grade carcinoma           Chemotherapy       Taxol 175 mg/m2 and carboplatin AUC 6 every 21 days  Dose was reduced to taxol 135 mg/m2 and carboplatin AUC 5 with cycle 4  She is scheduled for cycle 6 of treatment          PMH:  Past Medical History:   Diagnosis Date    Anemia     Chemotherapy follow-up examination     Depression     Diabetes mellitus ([facility] [address] )     Endometrial cancer ([facility] [address] ) 12/2017    Hyperglycemia     vx type 2 dm -- last assessed 4/1/14; resolved 11/7/17    Hyperlipidemia     Hypertension     Obesity     last assessed 10/14/17; resolved 11/7/17       PSH:  Past Surgical History:   Procedure Laterality Date    ABDOMINAL SURGERY      GASTRIC BYPASS    CHOLECYSTECTOMY      at the time of gastric bypass    COLONOSCOPY      CT NEEDLE BIOPSY LYMPH NODE  7/8/2019    FL GUIDED CENTRAL VENOUS ACCESS DEVICE INSERTION  11/12/2019    GASTRIC BYPASS      HYSTERECTOMY Bilateral     total abdominal with salpingo-oophorectomy    IR PICC LINE  9/27/2019    IR PORT PLACEMENT  7/26/2019    IR PORT REMOVAL  9/20/2019    IR TUBE PLACEMENT NEPHROSTOMY  7/26/2019    OOPHORECTOMY Bilateral     MA INSJ TUNNELED CTR VAD W/SUBQ PORT AGE 5 YR/> Left 11/12/2019    Procedure: INSERTION VENOUS PORT ( PORT-A-CATH) IR;  Surgeon: Chuckie De Paz DO;  [facility];  Service: Interventional Radiology    MA LAP, RADICAL HYST W/ TUBE&OV, NODE BX N/A 12/19/2017    Procedure: HYSTERECTOMY LAPAROSCOPIC TOTAL ([address]) W/ ROBOTICS; BILATERAL SALPINGOOPHERECTOMY; LYMPH NODE DISSECTION; lysis of adhesions;  Surgeon: Clover Pollard MD;  [facility]: [facility];  Service: Gynecology Oncology    MA LAP,DIAGNOSTIC ABDOMEN N/A 12/19/2017    Procedure: LAPAROSCOPY DIAGNOSTIC;  Surgeon: Clover Pollard MD;  [facility];  Service: Gynecology Oncology    TONSILLECTOMY      US GUIDED BREAST BIOPSY RIGHT COMPLETE Right 6/28/2019     Social Hx:  Denies x3    Meds:  No current facility-administered medications on file prior to encounter        Current Outpatient Medications on File Prior to Encounter   Medication Sig Dispense Refill    acetaminophen (TYLENOL) 325 mg tablet Take 2 tablets (650 mg total) by mouth every 6 (six) hours as needed for mild pain, headaches or fever 30 tablet 0    ARIPiprazole (ABILIFY) 10 mg tablet Take 10 mg by mouth daily      aspirin (ECOTRIN LOW STRENGTH) 81 mg EC tablet Take 81 mg by mouth daily      cholecalciferol (VITAMIN D3) 1,000 units tablet Take 1,000 Units by mouth daily      enoxaparin (LOVENOX) 120 mg/0 8 mL Inject 0 8 mL (120 mg total) under the skin daily 30 Syringe 3    folic acid (FOLVITE) 1 mg tablet Take 1 tablet (1 mg total) by mouth daily  0    gemfibrozil (LOPID) 600 mg tablet TAKE 1 TABLET BY MOUTH DAILY 90 tablet 1    lisinopril (ZESTRIL) 5 mg tablet Take 5 mg by mouth daily      MYRBETRIQ 50 MG TB24 TAKE 1 TABLET DAILY 30 tablet 10    omeprazole (PriLOSEC) 20 mg delayed release capsule Take 20 mg by mouth daily      ondansetron (ZOFRAN) 8 mg tablet Take 1 tablet (8 mg total) by mouth every 8 (eight) hours as needed for nausea or vomiting 30 tablet 1    quinapril (ACCUPRIL) 5 mg tablet Take 1 tablet (5 mg total) by mouth daily at bedtime 90 tablet 1    saccharomyces boulardii (FLORASTOR) 250 mg capsule Take 1 capsule (250 mg total) by mouth 2 (two) times a day  0    venlafaxine (EFFEXOR) 75 mg tablet Take 75 mg by mouth 3 (three) times a day        enoxaparin (LOVENOX) 100 mg/mL Inject 0 9 mL (90 mg total) under the skin every 12 (twelve) hours (Patient taking differently: Inject 120 mg under the skin daily ) 60 Syringe 3    LORazepam (ATIVAN) 1 mg tablet Take 1 tablet (1 mg total) by mouth every 6 (six) hours as needed for anxiety (or nausea) for up to 10 days 10 tablet 0    Pegfilgrastim (NEULASTA DELIVERY KIT SC) Inject under the skin every 21 days      potassium chloride (KLOR-CON) 20 mEq packet Take 2 packets PO TID x 3 days, then 2 packets PO BID x 3 days (Patient not taking: Reported on 12/19/2019) 30 packet 1     Allergies: Allergies   Allergen Reactions    Cephalosporins Rash     Which Cephalosporin reaction was to not specified; however, has tolerated Amoxicillin, Cefazolin, and Cefepime     Physical Exam:  BP 93/51   Pulse 78   Temp 98 9 °F (37 2 °C)   Resp 18   Ht 4' 11" (1 499 m)   Wt 83 2 kg (183 lb 6 4 oz)   LMP  (LMP Unknown)   SpO2 98%   BMI 37 04 kg/m²     Physical Exam   Constitutional: She is oriented to person, place, and time  She appears well-developed and well-nourished  No distress  Resting comfortably   HENT:   Head: Normocephalic and atraumatic  Neck: Normal range of motion  Neck supple  Cardiovascular: Normal rate and regular rhythm  Exam reveals no gallop and no friction rub  Murmur heard  Grade 2 systolic murmur noted   Pulmonary/Chest: Effort normal and breath sounds normal  No respiratory distress  She has no wheezes  She has no rales  Abdominal: Soft  She exhibits no distension  There is no tenderness  There is no rebound and no guarding  Ecchymosis noted on abdomen most likely secondary to Lovenox administration   Musculoskeletal: She exhibits no edema or tenderness  Right nephrostomy tube in place, currently capped  Mild right-sided CVA tenderness with palpation   Neurological: She is alert and oriented to person, place, and time  Skin: Skin is warm and dry  She is not diaphoretic  Psychiatric: She has a normal mood and affect  Her behavior is normal    Vitals reviewed  Assessment:   60 y o  female with stage IB1 recurrent endometrial carcinoma admitted with sepsis secondary to urinary tract infection and worsening hydronephrosis with PCN being capped- patient reports feeling improved  Plan:   1  Sepsis most likely secondary to urinary source: Patient appears to be clinically improving and reports feeling better     - Afebrile since admission, tachycardia improved    - WBC 15 55 -> 11 80   - Procalcitonin: 0 85 --> 0 75, Lactic acid 0 9   - On Cefepime 2g Q 12h IV   - F/u final Ucx and blood cultures    2  Worsening hydronephrosis with [place]: Improving s/p IVF   - Cr 1 6 --> 1 44   - Urology following, plan to PCN to drainage    - PCN recently capped in office on 11/27- most likely cause for [place] and worsening hydronephrosis at this time  Recommend continue to trend Cr with PCN to drainage and unless tube needs to be changed, most likely will not be necessary  Follow final recs from Urology and IR  3  Recurrent endometrial Carcinoma, Stage IB1   - Receiving Taxol 135 mg/m2 and carboplatin AUC 5 (5/6 cycles completed) patient was due to complete last cycle of chemotherapy today  - Plan to reschedule last cycle when sepsis and infection resolves    4  DVT on therapeutic Lovenox outpatient   - Can continue Lovenox dosage of 1 5 mg/kg/day if replacement of nephrostomy tube is not deemed    - Patient reports refusal of SCDs- we discussed her recommendations for mechanical prophylaxis at this time    5  Chronic anemia: Secondary to chronic disease and recent chemotherapy   - Hg this AM 8 4   - S/p multiple transfusions, most recent 1U PRBC on 12/5/2019   - Recommend transfusion for Hg < 7    Thank you for your consultation, GYN/ Oncology will continue to follow      Discussed with Dr Catina Self MD  12/23/19

## 2019-12-23 NOTE — ASSESSMENT & PLAN NOTE
· In setting of endometrial cancer, with right nephrostomy  · Unfortunately CT abdomen/pelvis with worsening hydronephrosis  · Will request IR consultation for potential nephrostomy exchange, NPO pending this procedure  · Additionally will request Urology inputs for further management following exchange  · Treatment of suspected UTI as above

## 2019-12-23 NOTE — PROGRESS NOTES
Clyde Pagan met with patient provided blessing and anointed patient       12/23/19 1100   Clinical Encounter Type   Visited With Patient   Routine Visit Introduction   Continue Visiting Yes   Jainism Encounters   Jainism Needs Prayer   Sacramental Encounters   Sacrament of Sick-Anointing Anointed   Sacrament Other Other (Comment)  (Annette Shanks) Statement Selected

## 2019-12-24 LAB
ANION GAP SERPL CALCULATED.3IONS-SCNC: 6 MMOL/L (ref 4–13)
BASOPHILS # BLD AUTO: 0.03 THOUSANDS/ΜL (ref 0–0.1)
BASOPHILS NFR BLD AUTO: 0 % (ref 0–1)
BUN SERPL-MCNC: 27 MG/DL (ref 5–25)
CALCIUM SERPL-MCNC: 9.7 MG/DL (ref 8.3–10.1)
CHLORIDE SERPL-SCNC: 109 MMOL/L (ref 100–108)
CO2 SERPL-SCNC: 20 MMOL/L (ref 21–32)
CREAT SERPL-MCNC: 1.25 MG/DL (ref 0.6–1.3)
EOSINOPHIL # BLD AUTO: 0.01 THOUSAND/ΜL (ref 0–0.61)
EOSINOPHIL NFR BLD AUTO: 0 % (ref 0–6)
ERYTHROCYTE [DISTWIDTH] IN BLOOD BY AUTOMATED COUNT: 13.7 % (ref 11.6–15.1)
GFR SERPL CREATININE-BSD FRML MDRD: 46 ML/MIN/1.73SQ M
GLUCOSE SERPL-MCNC: 94 MG/DL (ref 65–140)
HCT VFR BLD AUTO: 27.8 % (ref 34.8–46.1)
HGB BLD-MCNC: 8.7 G/DL (ref 11.5–15.4)
IMM GRANULOCYTES # BLD AUTO: 0.11 THOUSAND/UL (ref 0–0.2)
IMM GRANULOCYTES NFR BLD AUTO: 1 % (ref 0–2)
LYMPHOCYTES # BLD AUTO: 0.83 THOUSANDS/ΜL (ref 0.6–4.47)
LYMPHOCYTES NFR BLD AUTO: 8 % (ref 14–44)
MAGNESIUM SERPL-MCNC: 2 MG/DL (ref 1.6–2.6)
MCH RBC QN AUTO: 29.5 PG (ref 26.8–34.3)
MCHC RBC AUTO-ENTMCNC: 31.3 G/DL (ref 31.4–37.4)
MCV RBC AUTO: 94 FL (ref 82–98)
MONOCYTES # BLD AUTO: 1.06 THOUSAND/ΜL (ref 0.17–1.22)
MONOCYTES NFR BLD AUTO: 10 % (ref 4–12)
NEUTROPHILS # BLD AUTO: 8.34 THOUSANDS/ΜL (ref 1.85–7.62)
NEUTS SEG NFR BLD AUTO: 81 % (ref 43–75)
NRBC BLD AUTO-RTO: 0 /100 WBCS
PLATELET # BLD AUTO: 192 THOUSANDS/UL (ref 149–390)
PMV BLD AUTO: 10.9 FL (ref 8.9–12.7)
POTASSIUM SERPL-SCNC: 3.4 MMOL/L (ref 3.5–5.3)
PROCALCITONIN SERPL-MCNC: 0.62 NG/ML
RBC # BLD AUTO: 2.95 MILLION/UL (ref 3.81–5.12)
SODIUM SERPL-SCNC: 135 MMOL/L (ref 136–145)
WBC # BLD AUTO: 10.38 THOUSAND/UL (ref 4.31–10.16)

## 2019-12-24 PROCEDURE — 84145 PROCALCITONIN (PCT): CPT | Performed by: NURSE PRACTITIONER

## 2019-12-24 PROCEDURE — 80048 BASIC METABOLIC PNL TOTAL CA: CPT | Performed by: NURSE PRACTITIONER

## 2019-12-24 PROCEDURE — 85025 COMPLETE CBC W/AUTO DIFF WBC: CPT | Performed by: NURSE PRACTITIONER

## 2019-12-24 PROCEDURE — 99232 SBSQ HOSP IP/OBS MODERATE 35: CPT | Performed by: PHYSICIAN ASSISTANT

## 2019-12-24 PROCEDURE — 83735 ASSAY OF MAGNESIUM: CPT | Performed by: NURSE PRACTITIONER

## 2019-12-24 PROCEDURE — 99232 SBSQ HOSP IP/OBS MODERATE 35: CPT | Performed by: NURSE PRACTITIONER

## 2019-12-24 PROCEDURE — 99233 SBSQ HOSP IP/OBS HIGH 50: CPT | Performed by: OBSTETRICS & GYNECOLOGY

## 2019-12-24 RX ORDER — POTASSIUM CHLORIDE AND SODIUM CHLORIDE 900; 300 MG/100ML; MG/100ML
100 INJECTION, SOLUTION INTRAVENOUS CONTINUOUS
Status: DISCONTINUED | OUTPATIENT
Start: 2019-12-24 | End: 2019-12-24

## 2019-12-24 RX ORDER — POTASSIUM CHLORIDE AND SODIUM CHLORIDE 900; 300 MG/100ML; MG/100ML
50 INJECTION, SOLUTION INTRAVENOUS CONTINUOUS
Status: DISCONTINUED | OUTPATIENT
Start: 2019-12-24 | End: 2019-12-25

## 2019-12-24 RX ADMIN — CEFEPIME HYDROCHLORIDE 2000 MG: 2 INJECTION, POWDER, FOR SOLUTION INTRAVENOUS at 22:12

## 2019-12-24 RX ADMIN — POTASSIUM CHLORIDE AND SODIUM CHLORIDE 100 ML/HR: 900; 300 INJECTION, SOLUTION INTRAVENOUS at 10:54

## 2019-12-24 RX ADMIN — MELATONIN 1000 UNITS: at 08:14

## 2019-12-24 RX ADMIN — Medication 250 MG: at 08:15

## 2019-12-24 RX ADMIN — ARIPIPRAZOLE 10 MG: 10 TABLET ORAL at 08:15

## 2019-12-24 RX ADMIN — VENLAFAXINE 75 MG: 37.5 TABLET ORAL at 08:14

## 2019-12-24 RX ADMIN — MUPIROCIN: 20 OINTMENT TOPICAL at 08:15

## 2019-12-24 RX ADMIN — POTASSIUM CHLORIDE AND SODIUM CHLORIDE 50 ML/HR: 900; 300 INJECTION, SOLUTION INTRAVENOUS at 11:56

## 2019-12-24 RX ADMIN — PANTOPRAZOLE SODIUM 40 MG: 40 TABLET, DELAYED RELEASE ORAL at 05:12

## 2019-12-24 RX ADMIN — SODIUM CHLORIDE 100 ML/HR: 0.9 INJECTION, SOLUTION INTRAVENOUS at 05:15

## 2019-12-24 RX ADMIN — VENLAFAXINE 75 MG: 37.5 TABLET ORAL at 17:02

## 2019-12-24 RX ADMIN — ASPIRIN 81 MG: 81 TABLET ORAL at 08:15

## 2019-12-24 RX ADMIN — CEFEPIME HYDROCHLORIDE 2000 MG: 2 INJECTION, POWDER, FOR SOLUTION INTRAVENOUS at 10:02

## 2019-12-24 RX ADMIN — Medication 250 MG: at 17:02

## 2019-12-24 RX ADMIN — ACETAMINOPHEN 650 MG: 325 TABLET ORAL at 22:08

## 2019-12-24 RX ADMIN — FOLIC ACID 1 MG: 1 TABLET ORAL at 08:14

## 2019-12-24 RX ADMIN — ENOXAPARIN SODIUM 120 MG: 120 INJECTION SUBCUTANEOUS at 08:15

## 2019-12-24 RX ADMIN — VENLAFAXINE 75 MG: 37.5 TABLET ORAL at 22:08

## 2019-12-24 RX ADMIN — OXYBUTYNIN 10 MG: 5 TABLET, FILM COATED, EXTENDED RELEASE ORAL at 08:15

## 2019-12-24 NOTE — PLAN OF CARE
Problem: Potential for Falls  Goal: Patient will remain free of falls  Description  INTERVENTIONS:  - Assess patient frequently for physical needs  -  Identify cognitive and physical deficits and behaviors that affect risk of falls  -  [place] fall precautions as indicated by assessment   - Educate patient/family on patient safety including physical limitations  - Instruct patient to call for assistance with activity based on assessment  - Modify environment to reduce risk of injury  - Consider OT/PT consult to assist with strengthening/mobility  Outcome: Progressing     Problem: Prexisting or High Potential for Compromised Skin Integrity  Goal: Skin integrity is maintained or improved  Description  INTERVENTIONS:  - Identify patients at risk for skin breakdown  - Assess and monitor skin integrity  - Assess and monitor nutrition and hydration status  - Monitor labs   - Assess for incontinence   - Turn and reposition patient  - Assist with mobility/ambulation  - Relieve pressure over bony prominences  - Avoid friction and shearing  - Provide appropriate hygiene as needed including keeping skin clean and dry  - Evaluate need for skin moisturizer/barrier cream  - Collaborate with interdisciplinary team   - Patient/family teaching  - Consider wound care consult   Outcome: Progressing     Problem: Nutrition/Hydration-ADULT  Goal: Nutrient/Hydration intake appropriate for improving, restoring or maintaining nutritional needs  Description  Monitor and assess patient's nutrition/hydration status for malnutrition  Collaborate with interdisciplinary team and initiate plan and interventions as ordered  Monitor patient's weight and dietary intake as ordered or per policy  Utilize nutrition screening tool and intervene as necessary  Determine patient's food preferences and provide high-protein, high-caloric foods as appropriate       INTERVENTIONS:  - Monitor oral intake, urinary output, labs, and treatment plans  - Assess nutrition and hydration status and recommend course of action  - Evaluate amount of meals eaten  - Assist patient with eating if necessary   - Allow adequate time for meals  - Recommend/ encourage appropriate diets, oral nutritional supplements, and vitamin/mineral supplements  - Order, calculate, and assess calorie counts as needed  - Recommend, monitor, and adjust tube feedings and TPN/PPN based on assessed needs  - Assess need for intravenous fluids  - Provide specific nutrition/hydration education as appropriate  - Include patient/family/caregiver in decisions related to nutrition  Outcome: Progressing     Problem: PAIN - ADULT  Goal: Verbalizes/displays adequate comfort level or baseline comfort level  Description  Interventions:  - Encourage patient to monitor pain and request assistance  - Assess pain using appropriate pain scale  - Administer analgesics based on type and severity of pain and evaluate response  - Implement non-pharmacological measures as appropriate and evaluate response  - Consider cultural and social influences on pain and pain management  - Notify physician/advanced practitioner if interventions unsuccessful or patient reports new pain  Outcome: Progressing     Problem: INFECTION - ADULT  Goal: Absence or prevention of progression during hospitalization  Description  INTERVENTIONS:  - Assess and monitor for signs and symptoms of infection  - Monitor lab/diagnostic results  - Monitor all insertion sites, i e  indwelling lines, tubes, and drains  - Monitor endotracheal if appropriate and nasal secretions for changes in amount and color  - [place] appropriate cooling/warming therapies per order  - Administer medications as ordered  - Instruct and encourage patient and family to use good hand hygiene technique  - Identify and instruct in appropriate isolation precautions for identified infection/condition  Outcome: Progressing  Goal: Absence of fever/infection during neutropenic period  Description  INTERVENTIONS:  - Monitor WBC    Outcome: Progressing     Problem: SAFETY ADULT  Goal: Maintain or return to baseline ADL function  Description  INTERVENTIONS:  -  Assess patient's ability to carry out ADLs; assess patient's baseline for ADL function and identify physical deficits which impact ability to perform ADLs (bathing, care of mouth/teeth, toileting, grooming, dressing, etc )  - Assess/evaluate cause of self-care deficits   - Assess range of motion  - Assess patient's mobility; develop plan if impaired  - Assess patient's need for assistive devices and provide as appropriate  - Encourage maximum independence but intervene and supervise when necessary  - Involve family in performance of ADLs  - Assess for home care needs following discharge   - Consider OT consult to assist with ADL evaluation and planning for discharge  - Provide patient education as appropriate  Outcome: Progressing  Goal: Maintain or return mobility status to optimal level  Description  INTERVENTIONS:  - Assess patient's baseline mobility status (ambulation, transfers, stairs, etc )    - Identify cognitive and physical deficits and behaviors that affect mobility  - Identify mobility aids required to assist with transfers and/or ambulation (gait belt, sit-to-stand, lift, walker, cane, etc )  - [place] fall precautions as indicated by assessment  - Record patient progress and toleration of activity level on Mobility SBAR; progress patient to next Phase/Stage  - Instruct patient to call for assistance with activity based on assessment  - Consider rehabilitation consult to assist with strengthening/weightbearing, etc   Outcome: Progressing     Problem: DISCHARGE PLANNING  Goal: Discharge to home or other facility with appropriate resources  Description  INTERVENTIONS:  - Identify barriers to discharge w/patient and caregiver  - Arrange for needed discharge resources and transportation as appropriate  - Identify discharge learning needs (meds, wound care, etc )  - Arrange for interpretive services to assist at discharge as needed  - Refer to Case Management Department for coordinating discharge planning if the patient needs post-hospital services based on physician/advanced practitioner order or complex needs related to functional status, cognitive ability, or social support system  Outcome: Progressing     Problem: Depression - IP adult  Goal: Effects of depression will be minimized  Description  INTERVENTIONS:  - Assess impact of patient's symptoms on level of functioning, self-care needs and offer support as indicated  - Assess patient/family knowledge of depression, impact on illness and need for teaching  - Provide emotional support, presence and reassurance  - Assess for possible suicidal thoughts, ideation or self-harm   If patient expresses suicidal thoughts or statements do not leave alone, notify physician/AP immediately, initiate Suicide Precautions, and determine need for continual observation   - Initiate consults and referrals as appropriate (a mental health professional, Spiritual Care)  - Administer medication as ordered  Outcome: Progressing     Problem: SPIRITUAL CARE  Goal: Pt/Family able to move forward in process of forgiving self, others and/or higher power  Description  INTERVENTIONS:  - Assist patient with any spiritual needs/requests such as communion, confession, anointing, etc  - Explore guilt and help patient/family identify possible spiritual/cultural beliefs and values  - Explore possibilities of making amends & reconciliation with self, others, and/or a greater power  - Guide patient/family in identifying painful feelings  - Help patient explore and identify spiritual beliefs, cultural understandings or values that may help or hinder letting go of issue  - Help patient explore feelings of anger, bitterness, resentment, anxiety   Help patient/family identify and examine the situation in which these feelings are experienced  - Help patient/family identify destructive displacement of feelings onto other individuals  - Refer patient to formal counseling and/or to [place] community for further support as needed or per request  Outcome: Progressing  Goal: Patient feels balance and connection with others and/or higher power that empowers the self during times of loss, guilt and fear  Description  INTERVENTIONS:  - Create safety for patient through empathic presence and non-judgmental listening  - Encourage patient to explore his/her values, beliefs and/or spiritual images and practices  - Encourage use of breath work, imagery, meditation, relaxation, reiki to ease distress and provide healing  - Encourage use of cultural and spiritual celebrations and rituals  - Facilitate discussion that helps patient sort out spiritual concerns  - Help patient identify where meaning/hope/comfort & strength are in his/her life  - Refer patient to [place] community for assistance, as appropriate  - Respond to patient/family need for prayer/ritual/sacrament/ceremony  Outcome: Progressing

## 2019-12-24 NOTE — PLAN OF CARE
Problem: Potential for Falls  Goal: Patient will remain free of falls  Description  INTERVENTIONS:  - Assess patient frequently for physical needs  -  Identify cognitive and physical deficits and behaviors that affect risk of falls  -  [place] fall precautions as indicated by assessment   - Educate patient/family on patient safety including physical limitations  - Instruct patient to call for assistance with activity based on assessment  - Modify environment to reduce risk of injury  - Consider OT/PT consult to assist with strengthening/mobility  Outcome: Progressing     Problem: Prexisting or High Potential for Compromised Skin Integrity  Goal: Skin integrity is maintained or improved  Description  INTERVENTIONS:  - Identify patients at risk for skin breakdown  - Assess and monitor skin integrity  - Assess and monitor nutrition and hydration status  - Monitor labs   - Assess for incontinence   - Turn and reposition patient  - Assist with mobility/ambulation  - Relieve pressure over bony prominences  - Avoid friction and shearing  - Provide appropriate hygiene as needed including keeping skin clean and dry  - Evaluate need for skin moisturizer/barrier cream  - Collaborate with interdisciplinary team   - Patient/family teaching  - Consider wound care consult   Outcome: Progressing     Problem: Nutrition/Hydration-ADULT  Goal: Nutrient/Hydration intake appropriate for improving, restoring or maintaining nutritional needs  Description  Monitor and assess patient's nutrition/hydration status for malnutrition  Collaborate with interdisciplinary team and initiate plan and interventions as ordered  Monitor patient's weight and dietary intake as ordered or per policy  Utilize nutrition screening tool and intervene as necessary  Determine patient's food preferences and provide high-protein, high-caloric foods as appropriate       INTERVENTIONS:  - Monitor oral intake, urinary output, labs, and treatment plans  - Assess nutrition and hydration status and recommend course of action  - Evaluate amount of meals eaten  - Assist patient with eating if necessary   - Allow adequate time for meals  - Recommend/ encourage appropriate diets, oral nutritional supplements, and vitamin/mineral supplements  - Order, calculate, and assess calorie counts as needed  - Recommend, monitor, and adjust tube feedings and TPN/PPN based on assessed needs  - Assess need for intravenous fluids  - Provide specific nutrition/hydration education as appropriate  - Include patient/family/caregiver in decisions related to nutrition  Outcome: Progressing     Problem: PAIN - ADULT  Goal: Verbalizes/displays adequate comfort level or baseline comfort level  Description  Interventions:  - Encourage patient to monitor pain and request assistance  - Assess pain using appropriate pain scale  - Administer analgesics based on type and severity of pain and evaluate response  - Implement non-pharmacological measures as appropriate and evaluate response  - Consider cultural and social influences on pain and pain management  - Notify physician/advanced practitioner if interventions unsuccessful or patient reports new pain  Outcome: Progressing     Problem: INFECTION - ADULT  Goal: Absence or prevention of progression during hospitalization  Description  INTERVENTIONS:  - Assess and monitor for signs and symptoms of infection  - Monitor lab/diagnostic results  - Monitor all insertion sites, i e  indwelling lines, tubes, and drains  - Monitor endotracheal if appropriate and nasal secretions for changes in amount and color  - [place] appropriate cooling/warming therapies per order  - Administer medications as ordered  - Instruct and encourage patient and family to use good hand hygiene technique  - Identify and instruct in appropriate isolation precautions for identified infection/condition  Outcome: Progressing  Goal: Absence of fever/infection during neutropenic period  Description  INTERVENTIONS:  - Monitor WBC    Outcome: Progressing     Problem: SAFETY ADULT  Goal: Maintain or return to baseline ADL function  Description  INTERVENTIONS:  -  Assess patient's ability to carry out ADLs; assess patient's baseline for ADL function and identify physical deficits which impact ability to perform ADLs (bathing, care of mouth/teeth, toileting, grooming, dressing, etc )  - Assess/evaluate cause of self-care deficits   - Assess range of motion  - Assess patient's mobility; develop plan if impaired  - Assess patient's need for assistive devices and provide as appropriate  - Encourage maximum independence but intervene and supervise when necessary  - Involve family in performance of ADLs  - Assess for home care needs following discharge   - Consider OT consult to assist with ADL evaluation and planning for discharge  - Provide patient education as appropriate  Outcome: Progressing  Goal: Maintain or return mobility status to optimal level  Description  INTERVENTIONS:  - Assess patient's baseline mobility status (ambulation, transfers, stairs, etc )    - Identify cognitive and physical deficits and behaviors that affect mobility  - Identify mobility aids required to assist with transfers and/or ambulation (gait belt, sit-to-stand, lift, walker, cane, etc )  - [place] fall precautions as indicated by assessment  - Record patient progress and toleration of activity level on Mobility SBAR; progress patient to next Phase/Stage  - Instruct patient to call for assistance with activity based on assessment  - Consider rehabilitation consult to assist with strengthening/weightbearing, etc   Outcome: Progressing     Problem: DISCHARGE PLANNING  Goal: Discharge to home or other facility with appropriate resources  Description  INTERVENTIONS:  - Identify barriers to discharge w/patient and caregiver  - Arrange for needed discharge resources and transportation as appropriate  - Identify discharge learning needs (meds, wound care, etc )  - Arrange for interpretive services to assist at discharge as needed  - Refer to Case Management Department for coordinating discharge planning if the patient needs post-hospital services based on physician/advanced practitioner order or complex needs related to functional status, cognitive ability, or social support system  Outcome: Progressing     Problem: Depression - IP adult  Goal: Effects of depression will be minimized  Description  INTERVENTIONS:  - Assess impact of patient's symptoms on level of functioning, self-care needs and offer support as indicated  - Assess patient/family knowledge of depression, impact on illness and need for teaching  - Provide emotional support, presence and reassurance  - Assess for possible suicidal thoughts, ideation or self-harm   If patient expresses suicidal thoughts or statements do not leave alone, notify physician/AP immediately, initiate Suicide Precautions, and determine need for continual observation   - Initiate consults and referrals as appropriate (a mental health professional, Spiritual Care)  - Administer medication as ordered  Outcome: Progressing     Problem: SPIRITUAL CARE  Goal: Pt/Family able to move forward in process of forgiving self, others and/or higher power  Description  INTERVENTIONS:  - Assist patient with any spiritual needs/requests such as communion, confession, anointing, etc  - Explore guilt and help patient/family identify possible spiritual/cultural beliefs and values  - Explore possibilities of making amends & reconciliation with self, others, and/or a greater power  - Guide patient/family in identifying painful feelings  - Help patient explore and identify spiritual beliefs, cultural understandings or values that may help or hinder letting go of issue  - Help patient explore feelings of anger, bitterness, resentment, anxiety   Help patient/family identify and examine the situation in which these feelings are experienced  - Help patient/family identify destructive displacement of feelings onto other individuals  - Refer patient to formal counseling and/or to [place] community for further support as needed or per request  Outcome: Progressing  Goal: Patient feels balance and connection with others and/or higher power that empowers the self during times of loss, guilt and fear  Description  INTERVENTIONS:  - Create safety for patient through empathic presence and non-judgmental listening  - Encourage patient to explore his/her values, beliefs and/or spiritual images and practices  - Encourage use of breath work, imagery, meditation, relaxation, reiki to ease distress and provide healing  - Encourage use of cultural and spiritual celebrations and rituals  - Facilitate discussion that helps patient sort out spiritual concerns  - Help patient identify where meaning/hope/comfort & strength are in his/her life  - Refer patient to [place] community for assistance, as appropriate  - Respond to patient/family need for prayer/ritual/sacrament/ceremony  Outcome: Progressing

## 2019-12-24 NOTE — MALNUTRITION/BMI
This medical record reflects one or more clinical indicators suggestive of malnutrition and  Malnutrition Findings:   Malnutrition type: Acute illness( in the context of suspected UTI resulting in decreased appetite and intake)  Degree of Malnutrition: Malnutrition of moderate degree(evidenced by 5% wt loss since 11/27 and energy intake meeting < than 75% estimated needs for > than 7 days; treated with liberallized diet and trial of gelatein and prosource supplements)  Malnutrition Characteristics: Weight loss, Inadequate energy        See Nutrition note dated 12/24/19 for additional details  Completed nutrition assessment is viewable in the nutrition documentation

## 2019-12-24 NOTE — PLAN OF CARE
Problem: Nutrition/Hydration-ADULT  Goal: Nutrient/Hydration intake appropriate for improving, restoring or maintaining nutritional needs  Description  Monitor and assess patient's nutrition/hydration status for malnutrition  Collaborate with interdisciplinary team and initiate plan and interventions as ordered  Monitor patient's weight and dietary intake as ordered or per policy  Utilize nutrition screening tool and intervene as necessary  Determine patient's food preferences and provide high-protein, high-caloric foods as appropriate       INTERVENTIONS:  - Monitor oral intake, urinary output, labs, and treatment plans  - Assess nutrition and hydration status and recommend course of action  - Evaluate amount of meals eaten  - Assist patient with eating if necessary   - Allow adequate time for meals  - Recommend/ encourage appropriate diets, oral nutritional supplements, and vitamin/mineral supplements  - Order, calculate, and assess calorie counts as needed  - Recommend, monitor, and adjust tube feedings and TPN/PPN based on assessed needs  - Assess need for intravenous fluids  - Provide specific nutrition/hydration education as appropriate  - Include patient/family/caregiver in decisions related to nutrition  Outcome: Progressing 149.9

## 2019-12-24 NOTE — ASSESSMENT & PLAN NOTE
· Currently follows with [facility]; on carboplatin/Taxol chemotherapy     · Was due for infusion 12/23/2019 however will need to be delayed in setting of above apparent sepsis  · Gyn-Oncology following to determine timing for next cycle of chemotherapy  · Outpatient follow-up

## 2019-12-24 NOTE — PROGRESS NOTES
Progress Note - OB/GYN   Payton Melendez 60 y o  female MRN: [id number]  Unit/Bed#: [facility] 928-01 Encounter: [id number]    Assessment:  60 y o  with recurrent stage IB1 endometrial cancer undergoing chemotherapy on Carboplatin and Taxol with history of right nephrostomy tube placement with sepsis secondary to urinary source, improving  Plan:  1  Recurrent endometrial carcinoma, Stage IB1  - Imaging on 12/22/19 shows stable disease  - Receiving palliative chemotherapy with Carboplatin and Taxol; currently on hold  - Plan to reschedule last cycle of chemotherapy when sepsis and infection resolve (last cycle originally scheduled for 12/23)  - Will continue following outpatient with Dr Fredo Wylie for disease management  2  Sepsis most likely secondary to urinary source  -afebrile since admission, T-max 99 7°  - WBC: 15 55 --> 11 8 --> F/U AM CBC  - Procalcitonin 0 85 --> 0 75 and lactic acid wnl  - Continue Cefepime 2g Q 12hr IV   - Final urine culture and blood cultures processing    3  Worsening hydronephrosis with [place]  - Improving with IVF  - Cr 1 6 --> 1 44 --> F/U AM BMP  - Urology following  - Nephrostomy tube draining well  4  History of pulmonary embolism  - continue therapeutic anticoagulation with Lovenox  - Patient refuses SCDs     5  Chronic anemia  - Secondary to chronic disease and chemotherapy  - Hgb 9 2 --> 8 4 --> F/U AM CBC   - s/p Multiple transfusions, most recent 1U pRBC on 12/5/2019  - Recommend transfusion for Hg <7    GYN Oncology will continue to follow  Subjective/Objective     Subjective:   She had no acute issues overnight and has no complaints other than frequent urination overnight  Review of Systems   Constitutional: Positive for appetite change (Decreased appetite) and fatigue (Tired due to lack of sleep)  Negative for chills and fever  Respiratory: Negative for shortness of breath  Cardiovascular: Negative for chest pain and leg swelling     Gastrointestinal: Negative for abdominal distention, abdominal pain, constipation, diarrhea, nausea and vomiting  Genitourinary: Positive for frequency  Negative for bladder incontinence and difficulty urinating  Neurological: Negative for headaches  Objective:     Vitals: Blood pressure 136/76, pulse 87, temperature 97 7 °F (36 5 °C), resp  rate 20, height 4' 11" (1 499 m), weight 82 6 kg (182 lb 1 6 oz), SpO2 96 %, not currently breastfeeding  Physical Exam:     Physical Exam   Constitutional: She is oriented to person, place, and time  She appears well-developed and well-nourished  She is sleeping and cooperative  She is easily aroused  No distress  Cardiovascular: Normal rate, regular rhythm and normal heart sounds  Exam reveals no gallop and no friction rub  No murmur heard  Pulmonary/Chest: Effort normal and breath sounds normal  No stridor  No respiratory distress  She has no wheezes  Abdominal: Soft  She exhibits no distension  There is no tenderness  There is no guarding  Genitourinary:   Genitourinary Comments: PCN draining clear yellow urine    Neurological: She is alert, oriented to person, place, and time and easily aroused  Skin: Skin is warm and dry  She is not diaphoretic  Psychiatric: She has a normal mood and affect  Her behavior is normal    Vitals reviewed  Lab, Imaging and other studies: I have personally reviewed pertinent reports        Lab Results   Component Value Date    WBC 11 80 (H) 12/23/2019    HGB 8 4 (L) 12/23/2019    HCT 26 3 (L) 12/23/2019    MCV 93 12/23/2019     12/23/2019               Laura Larsen MD  12/24/19

## 2019-12-24 NOTE — RESTORATIVE TECHNICIAN NOTE
Restorative Specialist Mobility Note       Activity: Ambulate in mehta, Ambulate in room, Bathroom privileges, Dangle, Stand at bedside, Chair(Educated/encouraged pt to ambulate with assistance 3-4 x's/day  Bed alarm on   Pt callbell, phone/tray within reach )     Assistive Device: None          Miri SANTILLAN, Restorative Technician, [employer] [facility]

## 2019-12-24 NOTE — PROGRESS NOTES
UROLOGY PROGRESS NOTE   Patient Identifiers: Saeed Meyer (MRN [id number])  Date of Service: 12/24/2019    Subjective:    Awake and alert  Feeling much better  Creatinine has normalized to 1 25  Urine cultures are pending  Patient has  complaints of   Some urinary frequency          Objective:     VITALS:    Vitals:    12/24/19 0708   BP: 110/55   Pulse: 73   Resp: 16   Temp: 97 9 °F (36 6 °C)   SpO2: 99%           LABS:  Lab Results   Component Value Date    HGB 8 7 (L) 12/24/2019    HCT 27 8 (L) 12/24/2019    WBC 10 38 (H) 12/24/2019     12/24/2019   ]    Lab Results   Component Value Date     10/27/2017    K 3 4 (L) 12/24/2019     (H) 12/24/2019    CO2 20 (L) 12/24/2019    BUN 27 (H) 12/24/2019    CREATININE 1 25 12/24/2019    CALCIUM 9 7 12/24/2019    GLUCOSE 219 (H) 12/19/2017   ]        INPATIENT MEDS:    Current Facility-Administered Medications:     acetaminophen (TYLENOL) tablet 650 mg, 650 mg, Oral, Q6H PRN, [place] Devoid, DO, 650 mg at 12/23/19 2212    ARIPiprazole (ABILIFY) tablet 10 mg, 10 mg, Oral, Daily, [place] Devoid, DO, 10 mg at 12/24/19 0815    aspirin (ECOTRIN LOW STRENGTH) EC tablet 81 mg, 81 mg, Oral, Daily, [place] Devoid, DO, 81 mg at 12/24/19 0815    cefepime (MAXIPIME) 2,000 mg in dextrose 5 % 50 mL IVPB, 2,000 mg, Intravenous, Q12H, Rush Devoid, DO, Last Rate: 100 mL/hr at 12/23/19 2208, 2,000 mg at 12/23/19 2208    cholecalciferol (VITAMIN D3) tablet 1,000 Units, 1,000 Units, Oral, Daily, Rush Devoid, DO, 1,000 Units at 12/24/19 0814    enoxaparin (LOVENOX) subcutaneous injection 120 mg, 1 5 mg/kg, Subcutaneous, Q24H [address], RAFITA Pringle, 120 mg at 26/84/13 5671    folic acid (FOLVITE) tablet 1 mg, 1 mg, Oral, Daily, Rush Bronson, DO, 1 mg at 12/24/19 1738    mupirocin (BACTROBAN) 2 % ointment, , Topical, Daily, RAFITA Melendez    oxybutynin (DITROPAN-XL) 24 hr tablet 10 mg, 10 mg, Oral, Daily, Rush Bronson, , 10 mg at 12/24/19 0815    pantoprazole (PROTONIX) EC tablet 40 mg, 40 mg, Oral, Early Morning, Larissa Bar, DO, 40 mg at 12/24/19 5452    saccharomyces boulardii (FLORASTOR) capsule 250 mg, 250 mg, Oral, BID, Larissa Bar, DO, 250 mg at 12/24/19 0815    sodium chloride 0 9 % with KCl 40 mEq/L infusion (premix), 100 mL/hr, Intravenous, Continuous, Daija Worley MD    [facility]) tablet 75 mg, 75 mg, Oral, TID, Larissa Bar, DO, 75 mg at 12/24/19 1866      Physical Exam:   /55   Pulse 73   Temp 97 9 °F (36 6 °C)   Resp 16   Ht 4' 11" (1 499 m)   Wt 82 6 kg (182 lb 1 6 oz)   LMP  (LMP Unknown)   SpO2 99%   BMI 36 78 kg/m²   GEN: no acute distress    RESP: breathing comfortably with no accessory muscle use    ABD: soft, non-tender, non-distended   INCISION:    EXT: no significant peripheral edema       NEPHROSTOMY: in place draining clear yellow urine  no clots and       RADIOLOGY:    none     Assessment:   1  Sepsis from urinary tract source  2  Right hydronephrosis-with PCNU in place  3  Acute kidney injury  4  Uterine cancer with large right pelvic mass causing obstruction of the right ureter      Plan:   -  Has improved with nephrostomy bag open to gravity  - creatinine is now normalized    -  Recommend keeping nephrostomy tube open upon discharge  -

## 2019-12-24 NOTE — ASSESSMENT & PLAN NOTE
· POA, with tachycardia, leukocytosis, elevated procalcitonin; patient had reported fever at home  Suspected in setting of UTI with hydronephrosis    · Fortunately she is hemodynamically stable and nontoxic appearing at this time  · Received IV cefepime in ED and will continue for now; patient does have cephalosporin allergy noted but has tolerated cefepime in past and appears to have tolerated 1st dose in ED  · Urine culture growing Enterococcus  Susceptibilities still pending  · Blood culture show no growth to date    · Patient received cefepime 2 g IV every 12 hours for 2 days  · Will transition oral amoxicillin 500 mg every 12 hours for 5 more days to complete a 7 day course of therapy  · Patient to follow up with Urology as an outpatient

## 2019-12-24 NOTE — ASSESSMENT & PLAN NOTE
· In setting of endometrial cancer with right nephrostomy  · Unfortunately CT abdomen/pelvis with worsening hydronephrosis  · Urology following, appreciate input  · Urology recommended opening nephrostomy tube to drainage bag and leaving open on discharge   · No need for IR consultation   · Treatment of suspected UTI as above  · Follow-up with Urology in 1 month  · Continue flushing drain daily

## 2019-12-24 NOTE — PROGRESS NOTES
Progress Note - Joe Melendez 1955, 60 y o  female MRN: [id number]    Unit/Bed#: [facility] 928-01 Encounter: [id number]    Primary Care Provider: Lety Abraham DO   Date and time admitted to hospital: 12/22/2019  8:21 PM        * Sepsis secondary to UTI [facility])  Assessment & Plan  · POA, with tachycardia, leukocytosis, elevated procalcitonin; patient had reported fever at home    Suspected in setting of UTI with hydronephrosis    · Fortunately she is hemodynamically stable and nontoxic appearing at this time  · Received IV cefepime in ED and will continue for now; patient does have cephalosporin allergy noted but has tolerated cefepime in past and appears to have tolerated 1st dose in ED  · Follow up results of blood cultures x2, urine culture  · Management of right nephrostomy as below  · Trend WBC, temperature curve, hemodynamics, procalcitonin    Other hydronephrosis  Assessment & Plan  · In setting of endometrial cancer, with right nephrostomy  · Unfortunately CT abdomen/pelvis with worsening hydronephrosis  · Urology following, appreciate input  · Urology recommended opening nephrostomy tube to drainage bag and leaving open on discharge   · No need for IR consultation   · Treatment of suspected UTI as above    Acute kidney injury [facility])  Assessment & Plan  · POA, baseline appears 0 7-0 9 however was 1 12 on 12/18/2019  · Possibly in setting of worsening hydronephrosis and sepsis  · Cr improved from 1 63-> 1 44 -> 1 25  · Continue NS + 40 meq at 50ml/hr for today  · Management of hydronephrosis as above  · Hold nephrotoxins and avoid hypotension  · Monitor with BMP while inpatient    Hyponatremia  Assessment & Plan  · Mild, possibly in setting of decreased oral intake  · Na 133 -> 133 -> 135  · Monitor BMP in am     Hypokalemia  Assessment & Plan  K+ 3 6 -> 3 4  GYN Onc added NS + 40 meq KCL to IVF   Repeat BMP in am     Acute deep vein thrombosis (DVT) of proximal vein of lower extremity (HCC)  Assessment & Plan  · On therapeutic Lovenox at home which has been resumes as there is no intervention planned    Endometrial cancer [facility])  Assessment & Plan  · Currently follows with [facility]; on carboplatin/Taxol chemotherapy  · Was due for infusion 2019 however will need to be delayed in setting of above apparent sepsis  · Gyn-Oncology following to determine timing for next cycle of chemotherapy      VTE Pharmacologic Prophylaxis:   Pharmacologic: Enoxaparin (Lovenox)  Mechanical VTE Prophylaxis in Place: Yes    Patient Centered Rounds: I have performed bedside rounds with nursing staff today  Discussions with Specialists or Other Care Team Provider: Nursing, CM, appreciate urology note    Education and Discussions with Family / Patient: I have answered all questions to the best of my ability  Family at bedside  Time Spent for Care: 20 minutes  More than 50% of total time spent on counseling and coordination of care as described above  Current Length of Stay: 2 day(s)    Current Patient Status: Inpatient   Certification Statement: The patient will continue to require additional inpatient hospital stay due to [place], hydronephrosis, UTI    Discharge Plan: Likely Wednesday pending cultures    Code Status: Level 1 - Full Code      Subjective: Tolerating right PCN to drainage bag  Feels like she is spontaneously voiding more today and has less urine output in PCN as compared to yesterday  Denies abdominal pain, N/V  Objective:     Vitals:   Temp (24hrs), Av 1 °F (36 7 °C), Min:97 7 °F (36 5 °C), Max:98 3 °F (36 8 °C)    Temp:  [97 7 °F (36 5 °C)-98 3 °F (36 8 °C)] 97 9 °F (36 6 °C)  HR:  [73-87] 73  Resp:  [16-20] 16  BP: (102-136)/(55-76) 110/55  SpO2:  [96 %-99 %] 99 %  Body mass index is 36 78 kg/m²  Input and Output Summary (last 24 hours):        Intake/Output Summary (Last 24 hours) at 2019 1206  Last data filed at 2019 1159  Gross per 24 hour   Intake 3163 33 ml Output 1800 ml   Net 1363 33 ml       Physical Exam:     Physical Exam   Constitutional: She is oriented to person, place, and time  She appears well-developed  No distress  HENT:   Head: Normocephalic  Neck: Normal range of motion  Cardiovascular: Normal rate  Pulmonary/Chest: Effort normal and breath sounds normal  No respiratory distress  She has no wheezes  She has no rhonchi  She has no rales  Abdominal: Soft  Bowel sounds are normal  She exhibits no distension  There is no tenderness  Right PCN draining clear yellow urine   Musculoskeletal: Normal range of motion  She exhibits no edema or tenderness  Neurological: She is alert and oriented to person, place, and time  Skin: Skin is warm and dry  Capillary refill takes less than 2 seconds  No rash noted  She is not diaphoretic  There is pallor  Psychiatric: She has a normal mood and affect  Judgment normal    Nursing note and vitals reviewed  Additional Data:     Labs:    Results from last 7 days   Lab Units 12/24/19  0550   WBC Thousand/uL 10 38*   HEMOGLOBIN g/dL 8 7*   HEMATOCRIT % 27 8*   PLATELETS Thousands/uL 192   NEUTROS PCT % 81*   LYMPHS PCT % 8*   MONOS PCT % 10   EOS PCT % 0     Results from last 7 days   Lab Units 12/24/19  0550  12/22/19  2103   POTASSIUM mmol/L 3 4*   < > 3 5   CHLORIDE mmol/L 109*   < > 102   CO2 mmol/L 20*   < > 19*   BUN mg/dL 27*   < > 40*   CREATININE mg/dL 1 25   < > 1 63*   CALCIUM mg/dL 9 7   < > 9 8   ALK PHOS U/L  --   --  101   ALT U/L  --   --  20   AST U/L  --   --  15    < > = values in this interval not displayed  Results from last 7 days   Lab Units 12/22/19  2103   INR  1 25*       * I Have Reviewed All Lab Data Listed Above  * Additional Pertinent Lab Tests Reviewed:  All Labs Within Last 24 Hours Reviewed    Imaging:    Imaging Reports Reviewed Today Include: CT C/A/P  Imaging Personally Reviewed by Myself Includes:  None    Recent Cultures (last 7 days):     Results from last 7 days Lab Units 12/22/19 2114 12/22/19 2104 12/22/19 2103   BLOOD CULTURE   --  No Growth at 24 hrs  No Growth at 24 hrs  URINE CULTURE  >100,000 cfu/ml Enterococcus faecium*  --   --        Last 24 Hours Medication List:     Current Facility-Administered Medications:  acetaminophen 650 mg Oral Q6H PRN Priscilla Locks, DO    ARIPiprazole 10 mg Oral Daily Priscilla Locks, DO    aspirin 81 mg Oral Daily Priscilla Locks, DO    cefepime 2,000 mg Intravenous Q12H Priscilla Locks, DO Last Rate: Stopped (12/24/19 1032)   cholecalciferol 1,000 Units Oral Daily Priscilla Locks, DO    enoxaparin 1 5 mg/kg Subcutaneous Q24H [facility] RAFITA Mccord    folic acid 1 mg Oral Daily Priscilla Locks, DO    mupirocin  Topical Daily RAFITA Mccord    oxybutynin 10 mg Oral Daily Priscilla Locks, DO    pantoprazole 40 mg Oral Early Morning Priscilla Locks, DO    saccharomyces boulardii 250 mg Oral BID Priscilla Locks, DO    sodium chloride 0 9 % with KCl 40 mEq/L 50 mL/hr Intravenous Continuous RAFITA Mccord Last Rate: 50 mL/hr (12/24/19 1156)   venlafaxine 75 mg Oral TID Priscilla Locks, DO         Today, Patient Was Seen By: RAFITA Mccord    ** Please Note: Dictation voice to text software may have been used in the creation of this document   **

## 2019-12-25 VITALS
HEIGHT: 59 IN | BODY MASS INDEX: 36.71 KG/M2 | HEART RATE: 74 BPM | RESPIRATION RATE: 20 BRPM | DIASTOLIC BLOOD PRESSURE: 64 MMHG | WEIGHT: 182.1 LBS | SYSTOLIC BLOOD PRESSURE: 126 MMHG | OXYGEN SATURATION: 97 % | TEMPERATURE: 97.7 F

## 2019-12-25 LAB
ANION GAP SERPL CALCULATED.3IONS-SCNC: 8 MMOL/L (ref 4–13)
BACTERIA UR CULT: NORMAL
BASOPHILS # BLD AUTO: 0.02 THOUSANDS/ΜL (ref 0–0.1)
BASOPHILS NFR BLD AUTO: 0 % (ref 0–1)
BUN SERPL-MCNC: 22 MG/DL (ref 5–25)
CALCIUM SERPL-MCNC: 9.4 MG/DL (ref 8.3–10.1)
CHLORIDE SERPL-SCNC: 114 MMOL/L (ref 100–108)
CO2 SERPL-SCNC: 20 MMOL/L (ref 21–32)
CREAT SERPL-MCNC: 1.08 MG/DL (ref 0.6–1.3)
EOSINOPHIL # BLD AUTO: 0.04 THOUSAND/ΜL (ref 0–0.61)
EOSINOPHIL NFR BLD AUTO: 1 % (ref 0–6)
ERYTHROCYTE [DISTWIDTH] IN BLOOD BY AUTOMATED COUNT: 13.8 % (ref 11.6–15.1)
GFR SERPL CREATININE-BSD FRML MDRD: 55 ML/MIN/1.73SQ M
GLUCOSE SERPL-MCNC: 89 MG/DL (ref 65–140)
HCT VFR BLD AUTO: 25.5 % (ref 34.8–46.1)
HGB BLD-MCNC: 8 G/DL (ref 11.5–15.4)
IMM GRANULOCYTES # BLD AUTO: 0.07 THOUSAND/UL (ref 0–0.2)
IMM GRANULOCYTES NFR BLD AUTO: 1 % (ref 0–2)
LYMPHOCYTES # BLD AUTO: 0.89 THOUSANDS/ΜL (ref 0.6–4.47)
LYMPHOCYTES NFR BLD AUTO: 13 % (ref 14–44)
MAGNESIUM SERPL-MCNC: 2 MG/DL (ref 1.6–2.6)
MCH RBC QN AUTO: 30 PG (ref 26.8–34.3)
MCHC RBC AUTO-ENTMCNC: 31.4 G/DL (ref 31.4–37.4)
MCV RBC AUTO: 96 FL (ref 82–98)
MONOCYTES # BLD AUTO: 0.68 THOUSAND/ΜL (ref 0.17–1.22)
MONOCYTES NFR BLD AUTO: 10 % (ref 4–12)
NEUTROPHILS # BLD AUTO: 5.28 THOUSANDS/ΜL (ref 1.85–7.62)
NEUTS SEG NFR BLD AUTO: 75 % (ref 43–75)
NRBC BLD AUTO-RTO: 0 /100 WBCS
PHOSPHATE SERPL-MCNC: 3.5 MG/DL (ref 2.3–4.1)
PLATELET # BLD AUTO: 187 THOUSANDS/UL (ref 149–390)
PMV BLD AUTO: 10 FL (ref 8.9–12.7)
POTASSIUM SERPL-SCNC: 4.1 MMOL/L (ref 3.5–5.3)
RBC # BLD AUTO: 2.67 MILLION/UL (ref 3.81–5.12)
SODIUM SERPL-SCNC: 142 MMOL/L (ref 136–145)
WBC # BLD AUTO: 6.98 THOUSAND/UL (ref 4.31–10.16)

## 2019-12-25 PROCEDURE — 83735 ASSAY OF MAGNESIUM: CPT | Performed by: NURSE PRACTITIONER

## 2019-12-25 PROCEDURE — 99239 HOSP IP/OBS DSCHRG MGMT >30: CPT | Performed by: NURSE PRACTITIONER

## 2019-12-25 PROCEDURE — 99231 SBSQ HOSP IP/OBS SF/LOW 25: CPT | Performed by: UROLOGY

## 2019-12-25 PROCEDURE — 85025 COMPLETE CBC W/AUTO DIFF WBC: CPT | Performed by: NURSE PRACTITIONER

## 2019-12-25 PROCEDURE — 84100 ASSAY OF PHOSPHORUS: CPT | Performed by: NURSE PRACTITIONER

## 2019-12-25 PROCEDURE — 80048 BASIC METABOLIC PNL TOTAL CA: CPT | Performed by: NURSE PRACTITIONER

## 2019-12-25 RX ORDER — AMOXICILLIN 500 MG/1
500 CAPSULE ORAL EVERY 12 HOURS SCHEDULED
Qty: 10 CAPSULE | Refills: 0 | Status: SHIPPED | OUTPATIENT
Start: 2019-12-25 | End: 2019-12-30

## 2019-12-25 RX ADMIN — ENOXAPARIN SODIUM 120 MG: 120 INJECTION SUBCUTANEOUS at 08:12

## 2019-12-25 RX ADMIN — OXYBUTYNIN 10 MG: 5 TABLET, FILM COATED, EXTENDED RELEASE ORAL at 08:12

## 2019-12-25 RX ADMIN — FOLIC ACID 1 MG: 1 TABLET ORAL at 08:12

## 2019-12-25 RX ADMIN — ARIPIPRAZOLE 10 MG: 10 TABLET ORAL at 08:12

## 2019-12-25 RX ADMIN — Medication 250 MG: at 08:12

## 2019-12-25 RX ADMIN — MUPIROCIN 1 APPLICATION: 20 OINTMENT TOPICAL at 08:13

## 2019-12-25 RX ADMIN — VENLAFAXINE 75 MG: 37.5 TABLET ORAL at 08:13

## 2019-12-25 RX ADMIN — MELATONIN 1000 UNITS: at 08:12

## 2019-12-25 RX ADMIN — ACETAMINOPHEN 650 MG: 325 TABLET ORAL at 04:59

## 2019-12-25 RX ADMIN — POTASSIUM CHLORIDE AND SODIUM CHLORIDE 50 ML/HR: 900; 300 INJECTION, SOLUTION INTRAVENOUS at 08:12

## 2019-12-25 RX ADMIN — CEFEPIME HYDROCHLORIDE 2000 MG: 2 INJECTION, POWDER, FOR SOLUTION INTRAVENOUS at 10:25

## 2019-12-25 RX ADMIN — PANTOPRAZOLE SODIUM 40 MG: 40 TABLET, DELAYED RELEASE ORAL at 05:00

## 2019-12-25 RX ADMIN — ASPIRIN 81 MG: 81 TABLET ORAL at 08:12

## 2019-12-25 NOTE — PLAN OF CARE
Problem: Potential for Falls  Goal: Patient will remain free of falls  Description  INTERVENTIONS:  - Assess patient frequently for physical needs  -  Identify cognitive and physical deficits and behaviors that affect risk of falls  -  [place] fall precautions as indicated by assessment   - Educate patient/family on patient safety including physical limitations  - Instruct patient to call for assistance with activity based on assessment  - Modify environment to reduce risk of injury  - Consider OT/PT consult to assist with strengthening/mobility  Outcome: Progressing     Problem: Prexisting or High Potential for Compromised Skin Integrity  Goal: Skin integrity is maintained or improved  Description  INTERVENTIONS:  - Identify patients at risk for skin breakdown  - Assess and monitor skin integrity  - Assess and monitor nutrition and hydration status  - Monitor labs   - Assess for incontinence   - Turn and reposition patient  - Assist with mobility/ambulation  - Relieve pressure over bony prominences  - Avoid friction and shearing  - Provide appropriate hygiene as needed including keeping skin clean and dry  - Evaluate need for skin moisturizer/barrier cream  - Collaborate with interdisciplinary team   - Patient/family teaching  - Consider wound care consult   Outcome: Progressing     Problem: Nutrition/Hydration-ADULT  Goal: Nutrient/Hydration intake appropriate for improving, restoring or maintaining nutritional needs  Description  Monitor and assess patient's nutrition/hydration status for malnutrition  Collaborate with interdisciplinary team and initiate plan and interventions as ordered  Monitor patient's weight and dietary intake as ordered or per policy  Utilize nutrition screening tool and intervene as necessary  Determine patient's food preferences and provide high-protein, high-caloric foods as appropriate       INTERVENTIONS:  - Monitor oral intake, urinary output, labs, and treatment plans  - Assess nutrition and hydration status and recommend course of action  - Evaluate amount of meals eaten  - Assist patient with eating if necessary   - Allow adequate time for meals  - Recommend/ encourage appropriate diets, oral nutritional supplements, and vitamin/mineral supplements  - Order, calculate, and assess calorie counts as needed  - Recommend, monitor, and adjust tube feedings and TPN/PPN based on assessed needs  - Assess need for intravenous fluids  - Provide specific nutrition/hydration education as appropriate  - Include patient/family/caregiver in decisions related to nutrition  Outcome: Progressing     Problem: PAIN - ADULT  Goal: Verbalizes/displays adequate comfort level or baseline comfort level  Description  Interventions:  - Encourage patient to monitor pain and request assistance  - Assess pain using appropriate pain scale  - Administer analgesics based on type and severity of pain and evaluate response  - Implement non-pharmacological measures as appropriate and evaluate response  - Consider cultural and social influences on pain and pain management  - Notify physician/advanced practitioner if interventions unsuccessful or patient reports new pain  Outcome: Progressing     Problem: INFECTION - ADULT  Goal: Absence or prevention of progression during hospitalization  Description  INTERVENTIONS:  - Assess and monitor for signs and symptoms of infection  - Monitor lab/diagnostic results  - Monitor all insertion sites, i e  indwelling lines, tubes, and drains  - Monitor endotracheal if appropriate and nasal secretions for changes in amount and color  - [place] appropriate cooling/warming therapies per order  - Administer medications as ordered  - Instruct and encourage patient and family to use good hand hygiene technique  - Identify and instruct in appropriate isolation precautions for identified infection/condition  Outcome: Progressing  Goal: Absence of fever/infection during neutropenic period  Description  INTERVENTIONS:  - Monitor WBC    Outcome: Progressing     Problem: SAFETY ADULT  Goal: Maintain or return to baseline ADL function  Description  INTERVENTIONS:  -  Assess patient's ability to carry out ADLs; assess patient's baseline for ADL function and identify physical deficits which impact ability to perform ADLs (bathing, care of mouth/teeth, toileting, grooming, dressing, etc )  - Assess/evaluate cause of self-care deficits   - Assess range of motion  - Assess patient's mobility; develop plan if impaired  - Assess patient's need for assistive devices and provide as appropriate  - Encourage maximum independence but intervene and supervise when necessary  - Involve family in performance of ADLs  - Assess for home care needs following discharge   - Consider OT consult to assist with ADL evaluation and planning for discharge  - Provide patient education as appropriate  Outcome: Progressing  Goal: Maintain or return mobility status to optimal level  Description  INTERVENTIONS:  - Assess patient's baseline mobility status (ambulation, transfers, stairs, etc )    - Identify cognitive and physical deficits and behaviors that affect mobility  - Identify mobility aids required to assist with transfers and/or ambulation (gait belt, sit-to-stand, lift, walker, cane, etc )  - [place] fall precautions as indicated by assessment  - Record patient progress and toleration of activity level on Mobility SBAR; progress patient to next Phase/Stage  - Instruct patient to call for assistance with activity based on assessment  - Consider rehabilitation consult to assist with strengthening/weightbearing, etc   Outcome: Progressing     Problem: DISCHARGE PLANNING  Goal: Discharge to home or other facility with appropriate resources  Description  INTERVENTIONS:  - Identify barriers to discharge w/patient and caregiver  - Arrange for needed discharge resources and transportation as appropriate  - Identify discharge learning needs (meds, wound care, etc )  - Arrange for interpretive services to assist at discharge as needed  - Refer to Case Management Department for coordinating discharge planning if the patient needs post-hospital services based on physician/advanced practitioner order or complex needs related to functional status, cognitive ability, or social support system  Outcome: Progressing     Problem: Depression - IP adult  Goal: Effects of depression will be minimized  Description  INTERVENTIONS:  - Assess impact of patient's symptoms on level of functioning, self-care needs and offer support as indicated  - Assess patient/family knowledge of depression, impact on illness and need for teaching  - Provide emotional support, presence and reassurance  - Assess for possible suicidal thoughts, ideation or self-harm   If patient expresses suicidal thoughts or statements do not leave alone, notify physician/AP immediately, initiate Suicide Precautions, and determine need for continual observation   - Initiate consults and referrals as appropriate (a mental health professional, Spiritual Care)  - Administer medication as ordered  Outcome: Progressing     Problem: SPIRITUAL CARE  Goal: Pt/Family able to move forward in process of forgiving self, others and/or higher power  Description  INTERVENTIONS:  - Assist patient with any spiritual needs/requests such as communion, confession, anointing, etc  - Explore guilt and help patient/family identify possible spiritual/cultural beliefs and values  - Explore possibilities of making amends & reconciliation with self, others, and/or a greater power  - Guide patient/family in identifying painful feelings  - Help patient explore and identify spiritual beliefs, cultural understandings or values that may help or hinder letting go of issue  - Help patient explore feelings of anger, bitterness, resentment, anxiety   Help patient/family identify and examine the situation in which these feelings are experienced  - Help patient/family identify destructive displacement of feelings onto other individuals  - Refer patient to formal counseling and/or to [place] community for further support as needed or per request  Outcome: Progressing  Goal: Patient feels balance and connection with others and/or higher power that empowers the self during times of loss, guilt and fear  Description  INTERVENTIONS:  - Create safety for patient through empathic presence and non-judgmental listening  - Encourage patient to explore his/her values, beliefs and/or spiritual images and practices  - Encourage use of breath work, imagery, meditation, relaxation, reiki to ease distress and provide healing  - Encourage use of cultural and spiritual celebrations and rituals  - Facilitate discussion that helps patient sort out spiritual concerns  - Help patient identify where meaning/hope/comfort & strength are in his/her life  - Refer patient to [place] community for assistance, as appropriate  - Respond to patient/family need for prayer/ritual/sacrament/ceremony  Outcome: Progressing

## 2019-12-25 NOTE — PLAN OF CARE
Problem: Potential for Falls  Goal: Patient will remain free of falls  Description  INTERVENTIONS:  - Assess patient frequently for physical needs  -  Identify cognitive and physical deficits and behaviors that affect risk of falls  -  [place] fall precautions as indicated by assessment   - Educate patient/family on patient safety including physical limitations  - Instruct patient to call for assistance with activity based on assessment  - Modify environment to reduce risk of injury  - Consider OT/PT consult to assist with strengthening/mobility  Outcome: Progressing     Problem: Prexisting or High Potential for Compromised Skin Integrity  Goal: Skin integrity is maintained or improved  Description  INTERVENTIONS:  - Identify patients at risk for skin breakdown  - Assess and monitor skin integrity  - Assess and monitor nutrition and hydration status  - Monitor labs   - Assess for incontinence   - Turn and reposition patient  - Assist with mobility/ambulation  - Relieve pressure over bony prominences  - Avoid friction and shearing  - Provide appropriate hygiene as needed including keeping skin clean and dry  - Evaluate need for skin moisturizer/barrier cream  - Collaborate with interdisciplinary team   - Patient/family teaching  - Consider wound care consult   Outcome: Progressing     Problem: Nutrition/Hydration-ADULT  Goal: Nutrient/Hydration intake appropriate for improving, restoring or maintaining nutritional needs  Description  Monitor and assess patient's nutrition/hydration status for malnutrition  Collaborate with interdisciplinary team and initiate plan and interventions as ordered  Monitor patient's weight and dietary intake as ordered or per policy  Utilize nutrition screening tool and intervene as necessary  Determine patient's food preferences and provide high-protein, high-caloric foods as appropriate       INTERVENTIONS:  - Monitor oral intake, urinary output, labs, and treatment plans  - Assess nutrition and hydration status and recommend course of action  - Evaluate amount of meals eaten  - Assist patient with eating if necessary   - Allow adequate time for meals  - Recommend/ encourage appropriate diets, oral nutritional supplements, and vitamin/mineral supplements  - Order, calculate, and assess calorie counts as needed  - Recommend, monitor, and adjust tube feedings and TPN/PPN based on assessed needs  - Assess need for intravenous fluids  - Provide specific nutrition/hydration education as appropriate  - Include patient/family/caregiver in decisions related to nutrition  Outcome: Progressing     Problem: PAIN - ADULT  Goal: Verbalizes/displays adequate comfort level or baseline comfort level  Description  Interventions:  - Encourage patient to monitor pain and request assistance  - Assess pain using appropriate pain scale  - Administer analgesics based on type and severity of pain and evaluate response  - Implement non-pharmacological measures as appropriate and evaluate response  - Consider cultural and social influences on pain and pain management  - Notify physician/advanced practitioner if interventions unsuccessful or patient reports new pain  Outcome: Progressing     Problem: INFECTION - ADULT  Goal: Absence or prevention of progression during hospitalization  Description  INTERVENTIONS:  - Assess and monitor for signs and symptoms of infection  - Monitor lab/diagnostic results  - Monitor all insertion sites, i e  indwelling lines, tubes, and drains  - Monitor endotracheal if appropriate and nasal secretions for changes in amount and color  - [place] appropriate cooling/warming therapies per order  - Administer medications as ordered  - Instruct and encourage patient and family to use good hand hygiene technique  - Identify and instruct in appropriate isolation precautions for identified infection/condition  Outcome: Progressing  Goal: Absence of fever/infection during neutropenic period  Description  INTERVENTIONS:  - Monitor WBC    Outcome: Progressing     Problem: SAFETY ADULT  Goal: Maintain or return to baseline ADL function  Description  INTERVENTIONS:  -  Assess patient's ability to carry out ADLs; assess patient's baseline for ADL function and identify physical deficits which impact ability to perform ADLs (bathing, care of mouth/teeth, toileting, grooming, dressing, etc )  - Assess/evaluate cause of self-care deficits   - Assess range of motion  - Assess patient's mobility; develop plan if impaired  - Assess patient's need for assistive devices and provide as appropriate  - Encourage maximum independence but intervene and supervise when necessary  - Involve family in performance of ADLs  - Assess for home care needs following discharge   - Consider OT consult to assist with ADL evaluation and planning for discharge  - Provide patient education as appropriate  Outcome: Progressing  Goal: Maintain or return mobility status to optimal level  Description  INTERVENTIONS:  - Assess patient's baseline mobility status (ambulation, transfers, stairs, etc )    - Identify cognitive and physical deficits and behaviors that affect mobility  - Identify mobility aids required to assist with transfers and/or ambulation (gait belt, sit-to-stand, lift, walker, cane, etc )  - [place] fall precautions as indicated by assessment  - Record patient progress and toleration of activity level on Mobility SBAR; progress patient to next Phase/Stage  - Instruct patient to call for assistance with activity based on assessment  - Consider rehabilitation consult to assist with strengthening/weightbearing, etc   Outcome: Progressing     Problem: DISCHARGE PLANNING  Goal: Discharge to home or other facility with appropriate resources  Description  INTERVENTIONS:  - Identify barriers to discharge w/patient and caregiver  - Arrange for needed discharge resources and transportation as appropriate  - Identify discharge learning needs (meds, wound care, etc )  - Arrange for interpretive services to assist at discharge as needed  - Refer to Case Management Department for coordinating discharge planning if the patient needs post-hospital services based on physician/advanced practitioner order or complex needs related to functional status, cognitive ability, or social support system  Outcome: Progressing     Problem: Depression - IP adult  Goal: Effects of depression will be minimized  Description  INTERVENTIONS:  - Assess impact of patient's symptoms on level of functioning, self-care needs and offer support as indicated  - Assess patient/family knowledge of depression, impact on illness and need for teaching  - Provide emotional support, presence and reassurance  - Assess for possible suicidal thoughts, ideation or self-harm   If patient expresses suicidal thoughts or statements do not leave alone, notify physician/AP immediately, initiate Suicide Precautions, and determine need for continual observation   - Initiate consults and referrals as appropriate (a mental health professional, Spiritual Care)  - Administer medication as ordered  Outcome: Progressing     Problem: SPIRITUAL CARE  Goal: Pt/Family able to move forward in process of forgiving self, others and/or higher power  Description  INTERVENTIONS:  - Assist patient with any spiritual needs/requests such as communion, confession, anointing, etc  - Explore guilt and help patient/family identify possible spiritual/cultural beliefs and values  - Explore possibilities of making amends & reconciliation with self, others, and/or a greater power  - Guide patient/family in identifying painful feelings  - Help patient explore and identify spiritual beliefs, cultural understandings or values that may help or hinder letting go of issue  - Help patient explore feelings of anger, bitterness, resentment, anxiety   Help patient/family identify and examine the situation in which these feelings are experienced  - Help patient/family identify destructive displacement of feelings onto other individuals  - Refer patient to formal counseling and/or to [place] community for further support as needed or per request  Outcome: Progressing  Goal: Patient feels balance and connection with others and/or higher power that empowers the self during times of loss, guilt and fear  Description  INTERVENTIONS:  - Create safety for patient through empathic presence and non-judgmental listening  - Encourage patient to explore his/her values, beliefs and/or spiritual images and practices  - Encourage use of breath work, imagery, meditation, relaxation, reiki to ease distress and provide healing  - Encourage use of cultural and spiritual celebrations and rituals  - Facilitate discussion that helps patient sort out spiritual concerns  - Help patient identify where meaning/hope/comfort & strength are in his/her life  - Refer patient to [place] community for assistance, as appropriate  - Respond to patient/family need for prayer/ritual/sacrament/ceremony  Outcome: Progressing

## 2019-12-25 NOTE — DISCHARGE SUMMARY
Discharge- Ana Lilia Cabrera 1955, 60 y o  female MRN: [id number]    Unit/Bed#: [facility] 928-01 Encounter: [id number]    Primary Care Provider: Kizzy Wyatt DO   Date and time admitted to hospital: 12/22/2019  8:21 PM        * Sepsis secondary to UTI [facility])  Assessment & Plan  · POA, with tachycardia, leukocytosis, elevated procalcitonin; patient had reported fever at home  Suspected in setting of UTI with hydronephrosis    · Fortunately she is hemodynamically stable and nontoxic appearing at this time  · Received IV cefepime in ED and will continue for now; patient does have cephalosporin allergy noted but has tolerated cefepime in past and appears to have tolerated 1st dose in ED  · Urine culture growing Enterococcus  Susceptibilities still pending  · Blood culture show no growth to date    · Patient received cefepime 2 g IV every 12 hours for 2 days  · Will transition oral amoxicillin 500 mg every 12 hours for 5 more days to complete a 7 day course of therapy  · Patient to follow up with Urology as an outpatient    Other hydronephrosis  Assessment & Plan  · In setting of endometrial cancer with right nephrostomy  · Unfortunately CT abdomen/pelvis with worsening hydronephrosis  · Urology following, appreciate input  · Urology recommended opening nephrostomy tube to drainage bag and leaving open on discharge   · No need for IR consultation   · Treatment of suspected UTI as above  · Follow-up with Urology in 1 month  · Continue flushing drain daily    Acute kidney injury ([address] )  Assessment & Plan  · POA, baseline appears 0 7-0 9 however was 1 12 on 12/18/2019  · Possibly in setting of worsening hydronephrosis and sepsis  · Cr improved from 1 63-> 1 44 -> 1 25 -> 1 08  · Okay to discontinue fluids  · Management of hydronephrosis as above    Hyponatremia  Assessment & Plan  · Mild, possibly in setting of decreased oral intake  · Na 133 -> 133 -> 135 -> 142    Hypokalemia  Assessment & Plan  K+ 3 6 -> 3 4 -> 4 1 after repletion    Acute deep vein thrombosis (DVT) of proximal vein of lower extremity (HCC)  Assessment & Plan  · On therapeutic Lovenox     Endometrial cancer [facility])  Assessment & Plan  · Currently follows with [facility]; on carboplatin/Taxol chemotherapy  · Was due for infusion 12/23/2019 however will need to be delayed in setting of above apparent sepsis  · Gyn-Oncology following to determine timing for next cycle of chemotherapy  · Outpatient follow-up        Discharging Physician / Practitioner: Harsh Sanford  PCP: Anna Reddy DO  Admission Date:   Admission Orders (From admission, onward)     Ordered        12/22/19 2343  Inpatient Admission  Once                   Discharge Date: 12/25/19    Resolved Problems  Date Reviewed: 12/25/2019    None          Consultations During Hospital Stay:  · Urology  · Gynecology Oncology    Procedures Performed:   CT chest abdomen pelvis: Worsening right-sided hydronephrosis in comparison to the prior exams  Lack of contrast limits evaluation  No obvious fluid collection to suggest an abscess  Similar appearance of right external iliac chain lymphadenopathy  Significant Findings / Test Results:   · Worsening right hydronephrosis    Incidental Findings:   · None     Test Results Pending at Discharge (will require follow up): · Final urine and blood cultures     Outpatient Tests Requested:  · None    Complications:  None    Reason for Admission:  Hydronephrosis, UTI, sepsis    Hospital Course:     Oc Spence is a 60 y o  female patient with a past medical history significant for recurrent endometrial cancer currently on chemotherapy, right kidney hydronephrosis in the setting of right nephrostomy tube, history of acute DVT anticoagulated on therapeutic Lovenox, anemia secondary to chemotherapy, and hypertension who originally presented to the hospital on 12/22/2019 due to fever at home    Workup in the ED revealed sepsis due to UTI as well as worsening right hydronephrosis  She was started on IV cefepime  She was evaluated by Urology and Gynecology Oncology  Urology recommended on capping her right nephrostomy tube and connecting it to drainage bag  Patient's creatinine improved after opening the nephrostomy tube  Urine culture growing Enterococcus  Blood culture showed no growth to date  Patient is cleared for discharge home by Urology  She will be discharged on amoxicillin for 5 more days to complete a 7 day course of therapy  She is to continue to leave the nephrostomy tube and capped and draining to drainage bag  She will follow up with both Urology and Gynecology Oncology  Please see above list of diagnoses and related plan for additional information  Condition at Discharge: stable     Discharge Day Visit / Exam:     Subjective:  Overall, patient feels much improved today  She denies any right flank pain or abdominal pain, nausea, vomiting, diarrhea, chest pain, shortness of breath, fever, chills, or rigors  She is very anxious for discharge home today  She is tolerating right PCN tube to drainage bag  Vitals: Blood Pressure: 126/64 (12/25/19 1046)  Pulse: 74 (12/25/19 1046)  Temperature: 97 7 °F (36 5 °C) (12/25/19 0636)  Temp Source: Oral (12/22/19 2026)  Respirations: 20 (12/25/19 0636)  Height: 4' 11" (149 9 cm) (12/23/19 0055)  Weight - Scale: 82 6 kg (182 lb 1 6 oz) (12/24/19 0553)  SpO2: 97 % (12/25/19 0636)  Exam:   Physical Exam   Constitutional: She is oriented to person, place, and time  She appears well-developed  No distress  HENT:   Head: Normocephalic  Neck: Normal range of motion  Cardiovascular: Normal rate  Pulmonary/Chest: Effort normal and breath sounds normal  No respiratory distress  She has no rhonchi  Abdominal: Soft  Bowel sounds are normal  She exhibits no distension  There is no tenderness  Right PCN draining clear yellow urine to drainage bag    No signs of infection surrounding PCN insertion site  Musculoskeletal: Normal range of motion  She exhibits no edema or tenderness  Neurological: She is alert and oriented to person, place, and time  Skin: Skin is warm and dry  Capillary refill takes less than 2 seconds  She is not diaphoretic  Psychiatric: She has a normal mood and affect  Judgment normal    Nursing note and vitals reviewed  Discussion with Family:  Family at bedside    Discharge instructions/Information to patient and family:   See after visit summary for information provided to patient and family  Provisions for Follow-Up Care:  See after visit summary for information related to follow-up care and any pertinent home health orders  Disposition:     Home    For Discharges to [facility] SNF:   · Not Applicable to this Patient - Not Applicable to this Patient    Planned Readmission:  None     Discharge Statement:  I spent > 30 minutes discharging the patient  This time was spent on the day of discharge  I had direct contact with the patient on the day of discharge  Greater than 50% of the total time was spent examining patient, answering all patient questions, arranging and discussing plan of care with patient as well as directly providing post-discharge instructions  Additional time then spent on discharge activities  Discharge Medications:  See after visit summary for reconciled discharge medications provided to patient and family        ** Please Note: This note has been constructed using a voice recognition system **

## 2019-12-25 NOTE — PROGRESS NOTES
28-year-old female with a history of sepsis secondary to right ureteral obstruction secondary to endometrial carcinoma  The stent had been capped the patient presented with sepsis with fever And mildly elevated creatinine of 1 6  Right nephrostomy tube is since been opened to gravity  She remains afebrile  Creatinine now 1 08  White blood cell count 6 98  Urine culture reveals Enterococcus  Recommend following culture for sensitivity in treating with antibiotics appropriately  Recommend maintaining right nephrostomy tube open to gravity drainage for discharge home when cleared by medical service

## 2019-12-25 NOTE — DISCHARGE INSTRUCTIONS
UTI/hydronephrosis:  · Take amoxicillin 500 mg every 12 hours for 5 days  · Leave nephrostomy cap open and draining to nephrostomy bag  · Continue to flush the tube daily  · Follow up with Urology in 1 month

## 2019-12-26 ENCOUNTER — HOSPITAL ENCOUNTER (OUTPATIENT)
Dept: INFUSION CENTER | Facility: CLINIC | Age: 64
End: 2019-12-26

## 2019-12-26 ENCOUNTER — TELEPHONE (OUTPATIENT)
Dept: UROLOGY | Facility: MEDICAL CENTER | Age: 64
End: 2019-12-26

## 2019-12-26 ENCOUNTER — TRANSITIONAL CARE MANAGEMENT (OUTPATIENT)
Dept: FAMILY MEDICINE CLINIC | Facility: CLINIC | Age: 64
End: 2019-12-26

## 2019-12-26 ENCOUNTER — TELEPHONE (OUTPATIENT)
Dept: INFUSION CENTER | Facility: CLINIC | Age: 64
End: 2019-12-26

## 2019-12-26 NOTE — UTILIZATION REVIEW
Notification of Discharge  This is a Notification of Discharge from our facility [address]  Please be advised that this patient has been discharge from our facility  Below you will find the admission and discharge date and time including the patients disposition  PRESENTATION DATE: 12/22/2019  8:21 PM  OBS ADMISSION DATE:   IP ADMISSION DATE: 12/22/19 2343   DISCHARGE DATE: 12/25/2019 11:20 AM  DISPOSITION: Home/Self Care Home/Self Care   Admission Orders listed below:  Admission Orders (From admission, onward)     Ordered        12/22/19 2343  Inpatient Admission  Once                   Please contact the UR Department if additional information is required to close this patient's authorization/case  [address] Utilization Review Department  Main: [phone] x carefully listen to the prompts  All voicemails are confidential   [email or web address]  Send all requests for admission clinical reviews, approved or denied determinations and any other requests to dedicated fax number below belonging to the campus where the patient is receiving treatment   List of dedicated fax numbers:  [address] DENIALS (Administrative/Medical Necessity) [phone]   [address] (Maternity/NICU/Pediatrics) [phone]   Abdiel Congress [phone]     [address] [phone]   Arsen Miles [phone]   Dilcia Graciela [facility] [address] [facility] [phone]   Troy Morales [phone]   [address] [facility] [address]  [address] [facility] [address] [facility] [phone]

## 2019-12-26 NOTE — TELEPHONE ENCOUNTER
Called pt back to discuss more symptoms  Pt states she has an appointment with IR tomorrow to look at her tube  She requested an appointment to follow up her recent ER visit due to a UTI  The earliest I could find was 1/24/20 with Edmund Hung in the [place] office  I advised pt to keep hydrated and if she had fevers or chills to go to the ER  I also suggested that if she is continuing to have UTI symptoms after finishing her antibiotics, to call the office and we would send her urine out  Pt verbalized an agreement to plan

## 2019-12-26 NOTE — TELEPHONE ENCOUNTER
Patient of Dr Yuriy Durand seen in [place] office  Patient states she feels her nephrostomy tube came out some  She is having some discomfort  Please advise

## 2019-12-27 ENCOUNTER — HOSPITAL ENCOUNTER (OUTPATIENT)
Dept: RADIOLOGY | Facility: HOSPITAL | Age: 64
Discharge: HOME | End: 2019-12-27
Attending: UROLOGY | Admitting: RADIOLOGY
Payer: COMMERCIAL

## 2019-12-27 DIAGNOSIS — N13.30 HYDRONEPHROSIS, UNSPECIFIED HYDRONEPHROSIS TYPE: ICD-10-CM

## 2019-12-27 DIAGNOSIS — N13.30 HYDRONEPHROSIS: ICD-10-CM

## 2019-12-27 PROCEDURE — 50387 CHANGE NEPHROURETERAL CATH: CPT | Performed by: RADIOLOGY

## 2019-12-27 PROCEDURE — C1769 GUIDE WIRE: HCPCS

## 2019-12-27 PROCEDURE — 50387 CHANGE NEPHROURETERAL CATH: CPT

## 2019-12-27 PROCEDURE — C2625 STENT, NON-COR, TEM W/DEL SY: HCPCS

## 2019-12-27 RX ADMIN — IOHEXOL 20 ML: 350 INJECTION, SOLUTION INTRAVENOUS at 14:01

## 2019-12-27 NOTE — DISCHARGE INSTRUCTIONS
Nephrostomy Tube Care     WHAT YOU NEED TO KNOW:   A nephrostomy tube is a catheter (thin plastic tube) that is inserted through your skin and into your kidney  The nephrostomy tube drains urine from your kidney into a collecting bag outside your body  You may need a nephrostomy tube when something is blocking the normal flow of urine  A nephrostomy tube may be used for a short or a long period of time  The nephrostomy tube comes out of your back, so you will need someone to help care for your nephrostomy tube  DISCHARGE INSTRUCTIONS:      How to clean the skin around the nephrostomy tube and change the bandage:  Since the nephrostomy tube comes out of your back, you will not be able to care for it by yourself  Ask someone to follow the general directions below to check and care for your nephrostomy tube  Gather the items you will need  Disposable (single use) under-pad, and a clean washcloth  ¨ Plain soap, warm water, and new medical gloves  ¨ Sterile gauze bandages  ¨ Clear adhesive dressing or medical tape  ¨ Skin barrier  ¨ Protective skin film  ¨ Trash bag  · Remove the old bandage, and check the tube entry site  ¨ Have the patient lie on his side with the nephrostomy tube entry site facing up  Place the under-pad where it will catch drainage as you are working with the nephrostomy tube  ¨ Wash your hands with soap and water  Put on new medical gloves  ¨ Gently remove the old bandage, without pulling on the tube  Do this by holding the skin beside the tube with one hand  With the other hand, gently remove sticky tape and the skin barrier by pulling in the same direction as hair growth  Do not touch the side of the bandage that is placed over or around the tube  Throw the bandage and skin barrier away in a trash bag  ¨ Look for signs of infection, such as skin redness and swelling  Report any skin changes to healthcare providers  ¨ Clean the tube entry site      ¨ Hold the tube in place to keep it from being pulled out while you are cleaning around it  ¨ You will need to clean the area twice  For the first cleaning, wet a new gauze bandage with soap and water  Begin at the entry site of the tube  Wipe the skin in circles, moving away from the entry site  Remove blood and any other material with the gauze  Do this as often as needed  Use a new gauze bandage each time you clean the area, moving away from the entry site  ¨ For the second cleaning, wet a new gauze bandage with water  Begin at the entry site of the tube  Wipe the skin in circles, moving away from the entry site  Use a new gauze bandage each time you clean the area, moving away from the entry site  ¨ Gently pat the skin with a clean washcloth to dry it  · Apply the skin barrier and bandages  ¨ Roll up a bandage to make it thick, and place it under  the place where the tube enters the skin  Place it to support the tube, and stop it from kinking or bending  Tape the bandage in place, and apply more bandages if directed by a healthcare provider  ¨ Bring the tubing forward to the front and tape it to the skin  Do not stretch the tube tight, because this may pull the nephrostomy tube out  How often to change the bandage  Change the bandage around the tube, every other day  If your bandages  get dirty or wet, change them right away, and as often as needed  If your nephrostomy tube is to be used for a long period of time, the tube needs to be changed every 2 to 3 months  Healthcare providers will tell you when you need to make an appointment to have your tube changed  How to care for the urine drainage bag:   · Ask if you need to measure and write down how much urine is in the bag before you empty it  Drain urine out of the drainage bag when it is ½ to ? full  Open the spout at the bottom of the bag to empty the urine into the toilet  · You may need to detach the drainage bag from the nephrostomy tube to change it    If so, attach a new drainage bag tightly to the nephrostomy tube  ·   How to prevent problems with your nephrostomy tube:   · Change bandages, directed  This helps to prevent infection  Throw away or clean your drainage bag as directed by your healthcare provider  · Wipe the connecting ends of the drainage bag with alcohol before you reconnect the bag to the tube  This helps prevent infection  Keep the tube taped to your skin and connected to a drainage bag placed below the level of your kidneys  This helps prevent urine from backing up into your kidneys  You may wear a small drainage bag strapped to your leg to let you move around more easily  · Check the catheter to be sure it is in place after you change your clothes or do other activities  Do not wear tight clothing over the tube  Place the tubing over your thigh rather than under it when you are sitting down  Be sure that nothing is pulling on the nephrostomy tube when you move around  · Change positions if you see little or no urine in your drainage bag  Check to see if the urine tube is twisted or bent  Be sure that you are not sitting or lying on the tube  If there are no kinks and there is little or no urine in the drainage bag, tell your healthcare provider  · Flush out the tube as directed  Some tubes get flushed one time a day with 10 mls of NSS You will be given a prescription for the flushes  To flush the nephrostomy tube, clean both connections with alcohol swap  Twist off the drainage bag tube and twist the saline syringe into the nephrostomy tube and flush briskly  Remove the syringe and twist the drainage bag tube back into the nephrostomy tube  · Keep the site covered while you shower  Tape a piece of clear adhesive plastic over the dressing to keep it dry while you shower  Do not take tub baths      Contact Interventional Radiology at [phone] [facility] PATIENTS: Contact Interventional Radiology at [phone]) Yefri Garcia PATIENTS: Contact Interventional Radiology at [phone]) if:  · The skin around the nephrostomy tube is red, swollen, itches, or has a rash  · You have a fever greater than 101 or chills  · You have lower back or hip pain  · There are changes in how your urine looks or smells  · You have little or no urine draining from the nephrostomy tube  · You have nausea and are vomiting  · The black bernardo on your tube has moved, or the tube is longer than when it was put in    · You have questions or concerns about your condition or care  · The nephrostomy tube comes out completely  · There is blood, pus, or a bad smell coming from the place where the tube enters your skin  · Urine is leaking around the tube  The following pharmacies carry the flush syringes  [facility]                     [facility]       [address]  Phone [phone]            Phone [phone]  [address] & [facility]                      [address]                                 [phone]  Phone [phone]            Phone [phone]    [facility]                                                                         [facility] [phone]  [address]    [facility]   Phone [phone]

## 2019-12-28 LAB
BACTERIA BLD CULT: NORMAL
BACTERIA BLD CULT: NORMAL

## 2019-12-29 NOTE — ED ATTENDING ATTESTATION
12/22/2019  IScot DO, saw and evaluated the patient  I have discussed the patient with the resident/non-physician practitioner and agree with the resident's/non-physician practitioner's findings, Plan of Care, and MDM as documented in the resident's/non-physician practitioner's note, except where noted  All available labs and Radiology studies were reviewed  I was present for key portions of any procedure(s) performed by the resident/non-physician practitioner and I was immediately available to provide assistance  At this point I agree with the current assessment done in the Emergency Department  I have conducted an independent evaluation of this patient a history and physical is as follows: This is a 63-year-old female with a history of endometrial cancer currently receiving chemotherapy last treatment was three weeks ago  Patient noted to be febrile today  Reports weakness  Also generalized malaise and myalgias  She does have a right nephrostomy to does recently change in October due to tumor per causing hydronephrosis  On exam the patient is tachycardic  She meets SIRS criteria  He has no significant abdominal tenderness  There does appear to be treated and urine from her right nephrostomy two  No outward signs of abscess    Plan is to treat with antibiotics IV fluids septic workup admission to the hospital    ED Course         Critical Care Time  Procedures

## 2019-12-30 ENCOUNTER — HOSPITAL ENCOUNTER (OUTPATIENT)
Dept: RADIOLOGY | Facility: HOSPITAL | Age: 64
Discharge: HOME | End: 2019-12-30
Attending: UROLOGY
Payer: COMMERCIAL

## 2019-12-30 VITALS — OXYGEN SATURATION: 96 % | HEART RATE: 80 BPM

## 2019-12-30 DIAGNOSIS — N13.5 URETERAL STRICTURE: ICD-10-CM

## 2019-12-30 PROCEDURE — 50387 CHANGE NEPHROURETERAL CATH: CPT

## 2019-12-30 PROCEDURE — C1769 GUIDE WIRE: HCPCS

## 2019-12-30 PROCEDURE — 50387 CHANGE NEPHROURETERAL CATH: CPT | Performed by: RADIOLOGY

## 2019-12-30 RX ADMIN — IOHEXOL 20 ML: 350 INJECTION, SOLUTION INTRAVENOUS at 11:28

## 2019-12-30 NOTE — DISCHARGE INSTRUCTIONS
TUBE CARE INSTRUCTIONS    Care after your procedure: you will be scheduled for a 3 month PCNU tube change     Resume your normal diet  Small sips of flat soda will help with nausea  1  The properly functioning catheter should be forward flushed once (1x) daily with 10ml of normal saline using clean technique  You will be given a prescription for flushes  To flush the tube, clean both connections with alcohol swab  Twist off the drainage bag/ bulb  tubing and twist the saline syringe into the drainage tube and flush  Remove the syringe and twist the drainage bag / bulb tubing tubing back on     2  The drainage bag/bulb may be emptied as necessary  Keep a record of the amount of fluid you drain from your tube  This should be done with clean technique as well  3  A fresh dressing should be applied daily over the tube insertion site  4  As the tube is secured to the skin with only a suture,try not to pull on your tube  Tub baths are not permitted  Showers are permitted if the patient's skin entry site is prevented from getting wet  Similarly, washcloth "baths" are acceptable  Contact Interventional Radiology at [phone] [place] PATIENTS: Contact Interventional Radiology at [phone]) Madelinelaura Lee PATIENTS: Contact Interventional Radiology at [phone]) if:    1  Leakage or large amounts of liquid around the catheter  2  Fever of 101 degrees lasting several hours without other obvious cause (such as sore throat, flu, etc)  3  Persistent nausea or vomiting  4  Diminished drainage, which may be associated with pressure or pain  Or when the     drainage from your tube is less than 10mls for 48 hours  5  Catheter pulled back or falls out  The following pharmacies carry the flush syringes         [address] [facility] [address] [facility]  Phone [phone]            Phone [phone] [address] [facility]                                [phone] [address] Ernesto SANCHEZ                      [address] [facility]  Phone [phone]            Phone [phone]                      [place]                                                                                                          [phone]  [facility]  [facility] [address]    [address]  Phone [phone]

## 2020-01-02 ENCOUNTER — OFFICE VISIT (OUTPATIENT)
Dept: FAMILY MEDICINE CLINIC | Facility: CLINIC | Age: 65
End: 2020-01-02
Payer: COMMERCIAL

## 2020-01-02 VITALS
SYSTOLIC BLOOD PRESSURE: 114 MMHG | OXYGEN SATURATION: 98 % | DIASTOLIC BLOOD PRESSURE: 74 MMHG | TEMPERATURE: 96.4 F | HEART RATE: 72 BPM | HEIGHT: 59 IN | BODY MASS INDEX: 37.5 KG/M2 | RESPIRATION RATE: 16 BRPM | WEIGHT: 186 LBS

## 2020-01-02 DIAGNOSIS — N13.30 HYDRONEPHROSIS, UNSPECIFIED HYDRONEPHROSIS TYPE: ICD-10-CM

## 2020-01-02 DIAGNOSIS — A41.9 SEPSIS, DUE TO UNSPECIFIED ORGANISM, UNSPECIFIED WHETHER ACUTE ORGAN DYSFUNCTION PRESENT (HCC): Primary | ICD-10-CM

## 2020-01-02 DIAGNOSIS — N39.0 URINARY TRACT INFECTION WITHOUT HEMATURIA, SITE UNSPECIFIED: ICD-10-CM

## 2020-01-02 DIAGNOSIS — R82.79 URINE CULTURE POSITIVE: ICD-10-CM

## 2020-01-02 DIAGNOSIS — IMO0001 TRANSITION OF CARE PERFORMED WITH SHARING OF CLINICAL SUMMARY: ICD-10-CM

## 2020-01-02 PROCEDURE — 99496 TRANSJ CARE MGMT HIGH F2F 7D: CPT | Performed by: INTERNAL MEDICINE

## 2020-01-02 NOTE — PROGRESS NOTES
BMI Counseling: Body mass index is 37 57 kg/m²  The BMI is above normal  Nutrition recommendations include decreasing portion sizes and limiting drinks that contain sugar  Exercise recommendations include exercising 3-5 times per week  No pharmacotherapy was ordered  Patient referred to PCP due to patient being overweight  Assessment/Plan:    TCM Call (since 12/8/2019)     Date and time call was made  12/26/2019  1:45 PM    Patient was hospitialized at  [facility]    Date of Admission  12/22/19    Date of discharge  12/25/19    Diagnosis  sepsis    Disposition  Home      TCM Call (since 12/8/2019)     Scheduled for follow up? Yes    Did you obtain your prescribed medications  Yes    Do you need help managing your prescriptions or medications  No    Is transportation to your appointment needed  No    I have advised the patient to call PCP with any new or worsening symptoms  Javy Gess, MR    Are you recieving any outpatient services  No    Are you recieving home care services  Yes    Types of home care services  Nurse visit    Are you using any community resources  No    Current waiver services  No    Have you fallen in the last 12 months  No    Interperter language line needed  No    Counseling  Patient    Counseling topics  instructions for management; Importance of RX compliance           Diagnoses and all orders for this visit:    Sepsis, due to unspecified organism, unspecified whether acute organ dysfunction present ([facility] [address] )    Urine culture positive  -     UA (URINE) with reflex to Scope  -     Urine culture; Future    Transition of care performed with sharing of clinical summary    Urinary tract infection without hematuria, site unspecified    Hydronephrosis, unspecified hydronephrosis type  Comments:  has rt nephrostomy but worsened  to f/u with urology  now neph tube open to her leg bag          Subjective:      Patient ID: Naresh Tinsley is a 60 y o  female      Patient was admitted 12/22 and discharged 12/25/19 from the hospital she was admitted with urosepsis  She was treated with IV antibiotics and fluid  She has completed her outpt antibiotics  The following portions of the patient's history were reviewed and updated as appropriate: She  has a past medical history of Anemia, Chemotherapy follow-up examination, Depression, Diabetes mellitus ([facility] [address] ), Endometrial cancer ([facility] [address] ) (12/2017), Hyperglycemia, Hyperlipidemia, Hypertension, and Obesity  She   Patient Active Problem List    Diagnosis Date Noted    Obstruction of right ureter 01/03/2020    Gross hematuria 10/22/2019    Hypomagnesemia 10/18/2019    Ambulatory dysfunction 10/11/2019    Moderate protein-calorie malnutrition ([facility] [address] ) 10/03/2019    Bilateral lower extremity edema 09/30/2019    Generalized weakness 09/29/2019    Acute kidney injury ([facility] [address] ) 09/19/2019    Hypokalemia 09/19/2019    Hyponatremia 09/19/2019    Dehydration 09/18/2019    Chemotherapy-induced nausea 09/18/2019    Anemia 09/06/2019    Chem[facility] neutrop[facility] ) 08/30/2019    Cancer related pain 07/19/2019    Other hydronephrosis 07/16/2019    Encounter for central line care 07/16/2019    Acute deep vein thrombosis (DVT) of proximal vein of lower extremity ([facility] [address] ) 06/19/2019    Breast cancer screening 06/19/2019    Benign essential hypertension 12/19/2017    Class 3 severe obesity with body mass index (BMI) of 45 0 to 49 9 in adult [facility]) 12/19/2017    Prediabetes 12/19/2017    Endometrial cancer ([facility] ) 11/29/2017    Dyslipidemia 04/01/2014    Major depression, chronic 10/07/2013     She  has a past surgical history that includes Abdominal surgery; Colonoscopy; pr lap, radical hyst w/ tube&ov, node bx (N/A, 12/19/2017); pr lap,diagnostic abdomen (N/A, 12/19/2017); Cholecystectomy; Gastric bypass; Tonsillectomy; Hysterectomy (Bilateral);  Oophorectomy (Bilateral); US guided breast biopsy right complete (Right, 6/28/2019); CT needle biopsy lymph node (7/8/2019); IR port Placement (7/26/2019); IR tube placement nephrostomy (7/26/2019); IR port Removal (9/20/2019); IR PICC line (9/27/2019); pr insj tunneled ctr vad w/subq port age 13 yr/> (Left, 11/12/2019); and FL guided central venous access device insertion (11/12/2019)  Her family history includes Bone cancer in her maternal grandfather; Lymphoma in her father; No Known Problems in her brother, brother, maternal aunt, maternal aunt, maternal aunt, maternal aunt, maternal grandmother, paternal aunt, paternal aunt, paternal aunt, paternal aunt, paternal grandfather, paternal grandmother, and sister; Other in her mother; Ovarian cancer (age of onset: 48) in her mother  She  reports that she has never smoked  She has never used smokeless tobacco  She reports that she drank alcohol  She reports that she does not use drugs    Current Outpatient Medications   Medication Sig Dispense Refill    acetaminophen (TYLENOL) 325 mg tablet Take 2 tablets (650 mg total) by mouth every 6 (six) hours as needed for mild pain, headaches or fever 30 tablet 0    ARIPiprazole (ABILIFY) 10 mg tablet Take 10 mg by mouth daily      aspirin (ECOTRIN LOW STRENGTH) 81 mg EC tablet Take 81 mg by mouth daily      cholecalciferol (VITAMIN D3) 1,000 units tablet Take 1,000 Units by mouth daily      enoxaparin (LOVENOX) 120 mg/0 8 mL Inject 0 8 mL (120 mg total) under the skin daily 30 Syringe 3    folic acid (FOLVITE) 1 mg tablet Take 1 tablet (1 mg total) by mouth daily  0    gemfibrozil (LOPID) 600 mg tablet TAKE 1 TABLET BY MOUTH DAILY 90 tablet 1    MYRBETRIQ 50 MG TB24 TAKE 1 TABLET BY MOUTH EVERY DAY 90 tablet 3    omeprazole (PriLOSEC) 20 mg delayed release capsule Take 20 mg by mouth daily      ondansetron (ZOFRAN) 8 mg tablet Take 1 tablet (8 mg total) by mouth every 8 (eight) hours as needed for nausea or vomiting 30 tablet 1    Pegfilgrastim (NEULASTA DELIVERY KIT SC) Inject under the skin every 21 days      quinapril (ACCUPRIL) 5 mg tablet Take 1 tablet (5 mg total) by mouth daily at bedtime 90 tablet 1    saccharomyces boulardii (FLORASTOR) 250 mg capsule Take 1 capsule (250 mg total) by mouth 2 (two) times a day  0    venlafaxine (EFFEXOR) 75 mg tablet Take 75 mg by mouth 3 (three) times a day        LORazepam (ATIVAN) 1 mg tablet Take 1 tablet (1 mg total) by mouth every 6 (six) hours as needed for anxiety (or nausea) for up to 10 days 10 tablet 0     No current facility-administered medications for this visit        Current Outpatient Medications on File Prior to Visit   Medication Sig    acetaminophen (TYLENOL) 325 mg tablet Take 2 tablets (650 mg total) by mouth every 6 (six) hours as needed for mild pain, headaches or fever    ARIPiprazole (ABILIFY) 10 mg tablet Take 10 mg by mouth daily    aspirin (ECOTRIN LOW STRENGTH) 81 mg EC tablet Take 81 mg by mouth daily    cholecalciferol (VITAMIN D3) 1,000 units tablet Take 1,000 Units by mouth daily    enoxaparin (LOVENOX) 120 mg/0 8 mL Inject 0 8 mL (120 mg total) under the skin daily    folic acid (FOLVITE) 1 mg tablet Take 1 tablet (1 mg total) by mouth daily    gemfibrozil (LOPID) 600 mg tablet TAKE 1 TABLET BY MOUTH DAILY    MYRBETRIQ 50 MG TB24 TAKE 1 TABLET BY MOUTH EVERY DAY    omeprazole (PriLOSEC) 20 mg delayed release capsule Take 20 mg by mouth daily    ondansetron (ZOFRAN) 8 mg tablet Take 1 tablet (8 mg total) by mouth every 8 (eight) hours as needed for nausea or vomiting    Pegfilgrastim (NEULASTA DELIVERY KIT SC) Inject under the skin every 21 days    quinapril (ACCUPRIL) 5 mg tablet Take 1 tablet (5 mg total) by mouth daily at bedtime    saccharomyces boulardii (FLORASTOR) 250 mg capsule Take 1 capsule (250 mg total) by mouth 2 (two) times a day    venlafaxine (EFFEXOR) 75 mg tablet Take 75 mg by mouth 3 (three) times a day      LORazepam (ATIVAN) 1 mg tablet Take 1 tablet (1 mg total) by mouth every 6 (six) hours as needed for anxiety (or nausea) for up to 10 days     No current facility-administered medications on file prior to visit  She is allergic to cephalosporins       Review of Systems   Constitutional: Negative  HENT: Negative  Respiratory: Negative  Cardiovascular: Negative  Genitourinary: Negative for difficulty urinating, dysuria and hematuria  Neurological: Positive for weakness  Objective:      /74 (BP Location: Left arm, Patient Position: Sitting, Cuff Size: Large)   Pulse 72   Temp (!) 96 4 °F (35 8 °C) (Temporal)   Resp 16   Ht 4' 11" (1 499 m)   Wt 84 4 kg (186 lb)   LMP  (LMP Unknown)   SpO2 98%   BMI 37 57 kg/m²          Physical Exam   Constitutional: She appears well-developed and well-nourished  No distress  +obese   HENT:   Head: Normocephalic and atraumatic  Right Ear: External ear normal    Left Ear: External ear normal    Nose: Nose normal    Mouth/Throat: Oropharynx is clear and moist  No oropharyngeal exudate  Neck: Normal range of motion  Neck supple  No thyromegaly present  Cardiovascular: Normal rate, regular rhythm, normal heart sounds and intact distal pulses  Exam reveals no gallop and no friction rub  No murmur heard  Pulmonary/Chest: Effort normal and breath sounds normal  No respiratory distress  She has no wheezes  She has no rales  Abdominal: Soft  Bowel sounds are normal  She exhibits no distension and no mass  There is no tenderness  There is no guarding  Lymphadenopathy:     She has no cervical adenopathy  Skin: She is not diaphoretic  Vitals reviewed

## 2020-01-03 ENCOUNTER — DOCUMENTATION (OUTPATIENT)
Dept: NUTRITION | Facility: CLINIC | Age: 65
End: 2020-01-03

## 2020-01-03 ENCOUNTER — HOSPITAL ENCOUNTER (OUTPATIENT)
Dept: INFUSION CENTER | Facility: CLINIC | Age: 65
Discharge: HOME | End: 2020-01-03
Payer: COMMERCIAL

## 2020-01-03 ENCOUNTER — OFFICE VISIT (OUTPATIENT)
Dept: GYNECOLOGIC ONCOLOGY | Facility: CLINIC | Age: 65
End: 2020-01-03

## 2020-01-03 VITALS
HEIGHT: 59 IN | RESPIRATION RATE: 16 BRPM | BODY MASS INDEX: 37.5 KG/M2 | SYSTOLIC BLOOD PRESSURE: 104 MMHG | TEMPERATURE: 98.4 F | DIASTOLIC BLOOD PRESSURE: 76 MMHG | HEART RATE: 86 BPM | WEIGHT: 186 LBS

## 2020-01-03 DIAGNOSIS — Z45.2 ENCOUNTER FOR CENTRAL LINE CARE: ICD-10-CM

## 2020-01-03 DIAGNOSIS — I82.4Y9 ACUTE DEEP VEIN THROMBOSIS (DVT) OF PROXIMAL VEIN OF LOWER EXTREMITY, UNSPECIFIED LATERALITY (HCC): ICD-10-CM

## 2020-01-03 DIAGNOSIS — D64.81 ANEMIA DUE TO CHEMOTHERAPY: Primary | ICD-10-CM

## 2020-01-03 DIAGNOSIS — C54.1 ENDOMETRIAL CANCER (HCC): Primary | Chronic | ICD-10-CM

## 2020-01-03 DIAGNOSIS — T45.1X5A ANEMIA DUE TO CHEMOTHERAPY: Primary | ICD-10-CM

## 2020-01-03 DIAGNOSIS — N17.9 ACUTE KIDNEY INJURY (HCC): ICD-10-CM

## 2020-01-03 DIAGNOSIS — C54.1 ENDOMETRIAL CANCER (HCC): ICD-10-CM

## 2020-01-03 DIAGNOSIS — N13.5 OBSTRUCTION OF RIGHT URETER: ICD-10-CM

## 2020-01-03 PROBLEM — N39.0 SEPSIS SECONDARY TO UTI (HCC): Status: RESOLVED | Noted: 2019-12-23 | Resolved: 2020-01-03

## 2020-01-03 PROBLEM — B95.61 MSSA BACTEREMIA: Status: RESOLVED | Noted: 2019-09-20 | Resolved: 2020-01-03

## 2020-01-03 PROBLEM — A41.9 SEPSIS SECONDARY TO UTI (HCC): Status: RESOLVED | Noted: 2019-12-23 | Resolved: 2020-01-03

## 2020-01-03 PROBLEM — R78.81 MSSA BACTEREMIA: Status: RESOLVED | Noted: 2019-09-20 | Resolved: 2020-01-03

## 2020-01-03 LAB
ALBUMIN SERPL BCP-MCNC: 3 G/DL (ref 3.5–5)
ALP SERPL-CCNC: 92 U/L (ref 46–116)
ALT SERPL W P-5'-P-CCNC: 19 U/L (ref 12–78)
ANION GAP SERPL CALCULATED.3IONS-SCNC: 9 MMOL/L (ref 4–13)
AST SERPL W P-5'-P-CCNC: 10 U/L (ref 5–45)
BASOPHILS # BLD AUTO: 0.02 THOUSANDS/ΜL (ref 0–0.1)
BASOPHILS NFR BLD AUTO: 0 % (ref 0–1)
BILIRUB SERPL-MCNC: 0.12 MG/DL (ref 0.2–1)
BUN SERPL-MCNC: 23 MG/DL (ref 5–25)
CALCIUM SERPL-MCNC: 9.3 MG/DL (ref 8.3–10.1)
CHLORIDE SERPL-SCNC: 105 MMOL/L (ref 100–108)
CO2 SERPL-SCNC: 26 MMOL/L (ref 21–32)
CREAT SERPL-MCNC: 1.23 MG/DL (ref 0.6–1.3)
EOSINOPHIL # BLD AUTO: 0.07 THOUSAND/ΜL (ref 0–0.61)
EOSINOPHIL NFR BLD AUTO: 2 % (ref 0–6)
ERYTHROCYTE [DISTWIDTH] IN BLOOD BY AUTOMATED COUNT: 14.3 % (ref 11.6–15.1)
GFR SERPL CREATININE-BSD FRML MDRD: 47 ML/MIN/1.73SQ M
GLUCOSE SERPL-MCNC: 94 MG/DL (ref 65–140)
HCT VFR BLD AUTO: 25.2 % (ref 34.8–46.1)
HGB BLD-MCNC: 7.8 G/DL (ref 11.5–15.4)
IMM GRANULOCYTES # BLD AUTO: 0.09 THOUSAND/UL (ref 0–0.2)
IMM GRANULOCYTES NFR BLD AUTO: 2 % (ref 0–2)
LYMPHOCYTES # BLD AUTO: 1.45 THOUSANDS/ΜL (ref 0.6–4.47)
LYMPHOCYTES NFR BLD AUTO: 32 % (ref 14–44)
MAGNESIUM SERPL-MCNC: 1.6 MG/DL (ref 1.6–2.6)
MCH RBC QN AUTO: 30.2 PG (ref 26.8–34.3)
MCHC RBC AUTO-ENTMCNC: 31 G/DL (ref 31.4–37.4)
MCV RBC AUTO: 98 FL (ref 82–98)
MONOCYTES # BLD AUTO: 0.4 THOUSAND/ΜL (ref 0.17–1.22)
MONOCYTES NFR BLD AUTO: 9 % (ref 4–12)
NEUTROPHILS # BLD AUTO: 2.48 THOUSANDS/ΜL (ref 1.85–7.62)
NEUTS SEG NFR BLD AUTO: 55 % (ref 43–75)
NRBC BLD AUTO-RTO: 0 /100 WBCS
PLATELET # BLD AUTO: 370 THOUSANDS/UL (ref 149–390)
PMV BLD AUTO: 9.3 FL (ref 8.9–12.7)
POTASSIUM SERPL-SCNC: 4.4 MMOL/L (ref 3.5–5.3)
PROT SERPL-MCNC: 7.2 G/DL (ref 6.4–8.2)
RBC # BLD AUTO: 2.58 MILLION/UL (ref 3.81–5.12)
SODIUM SERPL-SCNC: 140 MMOL/L (ref 136–145)
WBC # BLD AUTO: 4.51 THOUSAND/UL (ref 4.31–10.16)

## 2020-01-03 PROCEDURE — 80053 COMPREHEN METABOLIC PANEL: CPT

## 2020-01-03 PROCEDURE — 83735 ASSAY OF MAGNESIUM: CPT

## 2020-01-03 PROCEDURE — 3066F NEPHROPATHY DOC TX: CPT | Performed by: OBSTETRICS & GYNECOLOGY

## 2020-01-03 PROCEDURE — 85025 COMPLETE CBC W/AUTO DIFF WBC: CPT

## 2020-01-03 PROCEDURE — 99214 OFFICE O/P EST MOD 30 MIN: CPT | Performed by: OBSTETRICS & GYNECOLOGY

## 2020-01-03 RX ORDER — PALONOSETRON 0.05 MG/ML
0.25 INJECTION, SOLUTION INTRAVENOUS ONCE
Status: CANCELLED | OUTPATIENT
Start: 2020-01-06

## 2020-01-03 RX ORDER — ACETAMINOPHEN 325 MG/1
650 TABLET ORAL ONCE
Status: CANCELLED | OUTPATIENT
Start: 2020-01-07

## 2020-01-03 RX ORDER — SODIUM CHLORIDE 9 MG/ML
20 INJECTION, SOLUTION INTRAVENOUS ONCE
Status: CANCELLED | OUTPATIENT
Start: 2020-01-07

## 2020-01-03 RX ORDER — DIPHENHYDRAMINE HCL 25 MG
25 TABLET ORAL ONCE
Status: CANCELLED | OUTPATIENT
Start: 2020-01-07

## 2020-01-03 RX ORDER — SODIUM CHLORIDE 9 MG/ML
20 INJECTION, SOLUTION INTRAVENOUS ONCE
Status: CANCELLED | OUTPATIENT
Start: 2020-01-06

## 2020-01-03 NOTE — PROGRESS NOTES
Brief Visit:      Met with pt and sister today in the infusion center  Pt says that she was recently admitted to the hospital for a UTI and lost another 10#  However, she said she has gained ~3# back since completing her abx earlier this week  She said she met with an RD while admitted who provided her with gelatine but she did not like the taste  She is meeting with GynOnc later today and she suspects that her chemo will be changed back to a higher dose  She is concerned that with her next dose of chemotherapy on Monday, she won't be feeling well, eating enough, and will lose more wt  We reviewed ways to optimize her nutrition intake starting today: start [place] Instant Breakfast powder mixed with whole milk between meals (samples provided), try a high protein gelatin recipe (Un[employer] unflavored protein powder samples and recipe provided to make with cherry jello, fruit, and whipped topping), try [employer]'s vanilla whole milk Islandic yogurt, and soups made with bone broth and extra beans/meat for added kcal/pro  All questions and concerns addressed during todays visit  Yenni Hicks has RD contact information  Follow Up Plan: phone follow up previously scheduled for 1/8        PMH:  Oncology Diagnosis & Treatments:     Endometrial cancer ([facility] [address] )    11/17/2017 Initial Diagnosis     Endometrial cancer ([facility] [address] )      12/19/2017 Surgery     Robotic assisted total laparoscopic hysterectomy with bilateral salpingo-oophorectomy and sentinel bilateral pelvic lymph node dissection  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 (4 4 x 3 2 cm tumor, 9 4/15 4mm invasion, NO LVSI, washings revealed atypical cellular changes)      12/19/2017 Genetic Testing     Morrison testing negative      7/8/2019 Recurrence     Presented with right lower extremity DVT and CT demonstrating right pelvic sidewall mass with venous, ureteral and nerve compression causing significant neuropathic pain    Core biopsy demonstrates high-grade carcinoma  Chemotherapy     Taxol 175 mg/m2 and carboplatin AUC 6 every 21 days  Dose was reduced to taxol 135 mg/m2 and carboplatin AUC 5 with cycle 4  She is scheduled for cycle 6 of treatment          Past Medical History:   Diagnosis Date    Anemia     Chemotherapy follow-up examination     Depression     Diabetes mellitus ([facility] [address] )     Endometrial cancer ([facility] [address] ) 12/2017    Hyperglycemia     vx type 2 dm -- last assessed 4/1/14; resolved 11/7/17    Hyperlipidemia     Hypertension     Obesity     last assessed 10/14/17; resolved 11/7/17     Past Surgical History:   Procedure Laterality Date    ABDOMINAL SURGERY      GASTRIC BYPASS    CHOLECYSTECTOMY      at the time of gastric bypass    COLONOSCOPY      CT NEEDLE BIOPSY LYMPH NODE  7/8/2019    FL GUIDED CENTRAL VENOUS ACCESS DEVICE INSERTION  11/12/2019    GASTRIC BYPASS      HYSTERECTOMY Bilateral     total abdominal with salpingo-oophorectomy    IR PICC LINE  9/27/2019    IR PORT PLACEMENT  7/26/2019    IR PORT REMOVAL  9/20/2019    IR TUBE PLACEMENT NEPHROSTOMY  7/26/2019    OOPHORECTOMY Bilateral     IL INSJ TUNNELED CTR VAD W/SUBQ PORT AGE 5 YR/> Left 11/12/2019    Procedure: INSERTION VENOUS PORT ( PORT-A-CATH) IR;  Surgeon: Kelsie Delgadillo DO;  [facility];  Service: Interventional Radiology    IL LAP, RADICAL HYST W/ TUBE&OV, NODE BX N/A 12/19/2017    Procedure: HYSTERECTOMY LAPAROSCOPIC TOTAL ([address]) W/ ROBOTICS; BILATERAL SALPINGOOPHERECTOMY; LYMPH NODE DISSECTION; lysis of adhesions;  Surgeon: Greg Cárdenas MD;  Location: [facility];  Service: Gynecology Oncology    IL LAP,DIAGNOSTIC ABDOMEN N/A 12/19/2017    Procedure: LAPAROSCOPY DIAGNOSTIC;  Surgeon: Greg Cárdenas MD;  [facility];  Service: Gynecology Oncology    TONSILLECTOMY      US GUIDED BREAST BIOPSY RIGHT COMPLETE Right 6/28/2019       Review of Medications:     Current Outpatient Medications:     acetaminophen (TYLENOL) 325 mg tablet, Take 2 tablets (650 mg total) by mouth every 6 (six) hours as needed for mild pain, headaches or fever, Disp: 30 tablet, Rfl: 0    ARIPiprazole (ABILIFY) 10 mg tablet, Take 10 mg by mouth daily, Disp: , Rfl:     aspirin (ECOTRIN LOW STRENGTH) 81 mg EC tablet, Take 81 mg by mouth daily, Disp: , Rfl:     cholecalciferol (VITAMIN D3) 1,000 units tablet, Take 1,000 Units by mouth daily, Disp: , Rfl:     enoxaparin (LOVENOX) 120 mg/0 8 mL, Inject 0 8 mL (120 mg total) under the skin daily, Disp: 30 Syringe, Rfl: 3    folic acid (FOLVITE) 1 mg tablet, Take 1 tablet (1 mg total) by mouth daily, Disp: , Rfl: 0    gemfibrozil (LOPID) 600 mg tablet, TAKE 1 TABLET BY MOUTH DAILY, Disp: 90 tablet, Rfl: 1    LORazepam (ATIVAN) 1 mg tablet, Take 1 tablet (1 mg total) by mouth every 6 (six) hours as needed for anxiety (or nausea) for up to 10 days, Disp: 10 tablet, Rfl: 0    MYRBETRIQ 50 MG TB24, TAKE 1 TABLET BY MOUTH EVERY DAY, Disp: 90 tablet, Rfl: 3    omeprazole (PriLOSEC) 20 mg delayed release capsule, Take 20 mg by mouth daily, Disp: , Rfl:     ondansetron (ZOFRAN) 8 mg tablet, Take 1 tablet (8 mg total) by mouth every 8 (eight) hours as needed for nausea or vomiting, Disp: 30 tablet, Rfl: 1    Pegfilgrastim (NEULASTA DELIVERY KIT SC), Inject under the skin every 21 days, Disp: , Rfl:     quinapril (ACCUPRIL) 5 mg tablet, Take 1 tablet (5 mg total) by mouth daily at bedtime, Disp: 90 tablet, Rfl: 1    saccharomyces boulardii (FLORASTOR) 250 mg capsule, Take 1 capsule (250 mg total) by mouth 2 (two) times a day, Disp: , Rfl: 0    venlafaxine (EFFEXOR) 75 mg tablet, Take 75 mg by mouth 3 (three) times a day  , Disp: , Rfl:     Most Recent Lab Results:   Lab Results   Component Value Date    WBC 4 51 01/03/2020    IRON 24 (L) 10/02/2019    TIBC 138 (L) 10/02/2019    FERRITIN 1,042 (H) 10/02/2019    CHOL 183 10/27/2017    TRIG 88 11/01/2018    HDL 59 11/01/2018    LDLCALC 106 (H) 10/27/2017    ALT 20 12/22/2019    AST 15 12/22/2019     10/27/2017     05/12/2017    K 4 1 12/25/2019    K 3 4 (L) 12/24/2019    BUN 22 12/25/2019    BUN 27 (H) 12/24/2019    CREATININE 1 08 12/25/2019    CREATININE 1 25 12/24/2019    PHOS 3 5 12/25/2019    PHOS 3 2 07/08/2019    GLUCOSE 219 (H) 12/19/2017    GLUCOSE 111 (H) 10/27/2017    HGBA1C 6 2 08/28/2019    HGBA1C 5 5 11/01/2018    HGBA1C 5 6 03/12/2018    CALCIUM 9 4 12/25/2019    FOLATE 5 4 10/06/2019    MG 2 0 12/25/2019       Anthropometric Measurements:   Ht Readings from Last 1 Encounters:   01/02/20 4' 11" (1 499 m)     -Weight History: Wt Readings from Last 15 Encounters:   01/02/20 84 4 kg (186 lb)   12/24/19 82 6 kg (182 lb 1 6 oz)   12/19/19 81 9 kg (180 lb 8 oz)   12/02/19 88 7 kg (195 lb 8 oz)   11/27/19 88 kg (193 lb 14 4 oz)   11/27/19 88 kg (194 lb)   11/25/19 87 5 kg (193 lb)   11/19/19 86 2 kg (190 lb)   11/11/19 86 2 kg (190 lb)   11/07/19 86 7 kg (191 lb 3 2 oz)   11/06/19 87 5 kg (193 lb)   10/29/19 88 8 kg (195 lb 12 8 oz)   10/22/19 92 1 kg (203 lb 0 7 oz)   10/17/19 90 7 kg (200 lb)   10/15/19 90 7 kg (200 lb)     Estimated body mass index is 37 57 kg/m² as calculated from the following:    Height as of 1/2/20: 4' 11" (1 499 m)  Weight as of 1/2/20: 84 4 kg (186 lb)

## 2020-01-03 NOTE — ASSESSMENT & PLAN NOTE
Percutaneous nephrostomy tube in place  She will undergo exchanges by interventional radiology every 3 months  Urology had recommended capping which unfortunately resulted in urosepsis  Recommend open drainage  I think it is highly unlikely that her drainage can be internalized in the future given disease burden and chronic parametrial disease

## 2020-01-03 NOTE — ASSESSMENT & PLAN NOTE
Patient is scheduled to receive 6th cycle of carboplatin and Taxol on January 6, 2020  Recent CT scan during hospital admission demonstrates stable disease  Disease seem confined to the right pelvic sidewall  I will obtain a PET-CT  If this demonstrates all active disease localized to right pelvic sidewall, she might benefit from tumor directed radiotherapy  We might consider referral to Radiation Oncology accordingly  I plan to see her back with results from PET-CT after current cycle of chemotherapy  She has been cleared to receive cycle 6 of chemotherapy as planned

## 2020-01-03 NOTE — LETTER
January 3, 2020     Roxy Rowe DO  [address]    Patient: Lorena Cox   YOB: 1955   Date of Visit: 1/3/2020       Dear Dr Cr Stafford:    Thank you for referring Lorena Cox to me for evaluation  Below are my notes for this consultation  If you have questions, please do not hesitate to call me  I look forward to following your patient along with you  Sincerely,        Lionel Davila MD        CC: MD Lionel An MD  1/3/2020  4:02 PM  Sign at close encounter  Assessment/Plan:    Problem List Items Addressed This Visit        Cardiovascular and Mediastinum    Acute deep vein thrombosis (DVT) of proximal vein of lower extremity (HCC)     On Lovenox 1 5 milligram/kilogram per day  Tolerating well  Will need anticoagulation for life  Genitourinary    Endometrial cancer ([facility] [address] ) - Primary (Chronic)     Patient is scheduled to receive 6th cycle of carboplatin and Taxol on January 6, 2020  Recent CT scan during hospital admission demonstrates stable disease  Disease seem confined to the right pelvic sidewall  I will obtain a PET-CT  If this demonstrates all active disease localized to right pelvic sidewall, she might benefit from tumor directed radiotherapy  We might consider referral to Radiation Oncology accordingly  I plan to see her back with results from PET-CT after current cycle of chemotherapy  She has been cleared to receive cycle 6 of chemotherapy as planned  Relevant Orders    NM PET CT skull base to mid thigh    Ambulatory referral to Radiation Oncology    Acute kidney injury ([facility] [address] )     Creatinine now at 1 2 mg/dL  Avoid nephrotoxics  Encouraged liberal hydration  Continue to monitor  Obstruction of right ureter     Percutaneous nephrostomy tube in place  She will undergo exchanges by interventional radiology every 3 months    Urology had recommended capping which unfortunately resulted in urosepsis  Recommend open drainage  I think it is highly unlikely that her drainage can be internalized in the future given disease burden and chronic parametrial disease  CHIEF COMPLAINT:   Here for consideration of cycle 6  Of chemotherapy for recurrent endometrial cancer  Problem:  Cancer Staging  Endometrial cancer [facility])  Staging form: Corpus Uteri - Carcinoma, AJCC 7th Edition  - Clinical stage from 12/19/2017: FIGO Stage IB (T1b, N0, M0) - Signed by Herminia Strange MD on 2/12/2018        Previous therapy:     Endometrial cancer ([facility] [address] )    11/17/2017 Initial Diagnosis     Endometrial cancer ([facility] [address] )      12/19/2017 Surgery     Robotic assisted total laparoscopic hysterectomy with bilateral salpingo-oophorectomy and sentinel bilateral pelvic lymph node dissection  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 (4 4 x 3 2 cm tumor, 9 4/15 4mm invasion, NO LVSI, washings revealed atypical cellular changes)      12/19/2017 Genetic Testing     Morrison testing negative      7/8/2019 Recurrence     Presented with right lower extremity DVT and CT demonstrating right pelvic sidewall mass with venous, ureteral and nerve compression causing significant neuropathic pain  Core biopsy demonstrates high-grade carcinoma  Chemotherapy     Taxol 175 mg/m2 and carboplatin AUC 6 every 21 days  Dose was reduced to taxol 135 mg/m2 and carboplatin AUC 5 with cycle 4  She is scheduled for cycle 6 of treatment  Patient ID: Klaus Bermudez is a 60 y o  female  HPI  Patient with recurrent endometrial cancer undergoing treatment with carboplatin and Taxol  She has received 5 cycles  Recently admitted to the hospital due to urosepsis  She has right percutaneous nephrostomy in place which prior to admission was capped  Now open to gravity  All infectious symptoms have resolved  She completed antibiotic therapy    She is on Lovenox 1 5 milligram/kilogram per day for DVT  She will need anticoagulation for life  Also acute kidney injury markedly improved prior to discharge with creatinine of 1 2 mg/dL  Denies vaginal bleeding, drainage or discharge  Intermittent grade 1 peripheral neuropathy  No other complaints  The following portions of the patient's history were reviewed and updated as appropriate: allergies, current medications, past family history, past medical history, past social history, past surgical history and problem list     Review of Systems  As above  Twelve point review of systems is otherwise unremarkable    Current Outpatient Medications   Medication Sig Dispense Refill    acetaminophen (TYLENOL) 325 mg tablet Take 2 tablets (650 mg total) by mouth every 6 (six) hours as needed for mild pain, headaches or fever 30 tablet 0    ARIPiprazole (ABILIFY) 10 mg tablet Take 10 mg by mouth daily      aspirin (ECOTRIN LOW STRENGTH) 81 mg EC tablet Take 81 mg by mouth daily      cholecalciferol (VITAMIN D3) 1,000 units tablet Take 1,000 Units by mouth daily      enoxaparin (LOVENOX) 120 mg/0 8 mL Inject 0 8 mL (120 mg total) under the skin daily 30 Syringe 3    folic acid (FOLVITE) 1 mg tablet Take 1 tablet (1 mg total) by mouth daily  0    gemfibrozil (LOPID) 600 mg tablet TAKE 1 TABLET BY MOUTH DAILY 90 tablet 1    MYRBETRIQ 50 MG TB24 TAKE 1 TABLET BY MOUTH EVERY DAY 90 tablet 3    omeprazole (PriLOSEC) 20 mg delayed release capsule Take 20 mg by mouth daily      ondansetron (ZOFRAN) 8 mg tablet Take 1 tablet (8 mg total) by mouth every 8 (eight) hours as needed for nausea or vomiting 30 tablet 1    Pegfilgrastim (NEULASTA DELIVERY KIT SC) Inject under the skin every 21 days      quinapril (ACCUPRIL) 5 mg tablet Take 1 tablet (5 mg total) by mouth daily at bedtime 90 tablet 1    saccharomyces boulardii (FLORASTOR) 250 mg capsule Take 1 capsule (250 mg total) by mouth 2 (two) times a day  0    venlafaxine (EFFEXOR) 75 mg tablet Take 75 mg by mouth 3 (three) times a day        LORazepam (ATIVAN) 1 mg tablet Take 1 tablet (1 mg total) by mouth every 6 (six) hours as needed for anxiety (or nausea) for up to 10 days 10 tablet 0     No current facility-administered medications for this visit  Objective:    Blood pressure 104/76, pulse 86, temperature 98 4 °F (36 9 °C), resp  rate 16, height 4' 11" (1 499 m), weight 84 4 kg (186 lb), not currently breastfeeding  Body mass index is 37 57 kg/m²  Body surface area is 1 79 meters squared  Physical Exam   Constitutional: She is oriented to person, place, and time  No distress  Eyes: No scleral icterus  Cardiovascular: Normal rate and regular rhythm  Exam reveals no friction rub  Pulmonary/Chest: Effort normal and breath sounds normal  No respiratory distress  Abdominal: Soft  She exhibits no distension  There is no tenderness  Right percutaneous nephrostomy tube in place  Neurological: She is alert and oriented to person, place, and time  No results found for:   Lab Results   Component Value Date     10/27/2017    K 4 4 01/03/2020     01/03/2020    CO2 26 01/03/2020    BUN 23 01/03/2020    CREATININE 1 23 01/03/2020    GLUCOSE 219 (H) 12/19/2017    GLUF 90 11/14/2019    CALCIUM 9 3 01/03/2020    CORRECTEDCA 10 2 (H) 12/09/2019    AST 10 01/03/2020    ALT 19 01/03/2020    ALKPHOS 92 01/03/2020    PROT 6 9 10/27/2017    BILITOT 0 3 10/27/2017    EGFR 47 01/03/2020     Lab Results   Component Value Date    WBC 4 51 01/03/2020    HGB 7 8 (L) 01/03/2020    HCT 25 2 (L) 01/03/2020    MCV 98 01/03/2020     01/03/2020     Lab Results   Component Value Date    NEUTROABS 2 48 01/03/2020 12/22/2019 Study Result     CT CHEST, ABDOMEN AND PELVIS WITHOUT IV CONTRAST     INDICATION:   concern for fluid collection, right nephrostomy tube pain, concern for mets      COMPARISON:  10/22/2019, CT abdomen pelvis    CT chest abdomen pelvis dated 9/21/2019     TECHNIQUE: CT examination of the chest, abdomen and pelvis was performed without intravenous contrast   Axial, sagittal, and coronal 2D reformatted images were created from the source data and submitted for interpretation       Radiation dose length product (DLP) for this visit:  841 79 mGy-cm   This examination, like all CT scans performed in the [facility], was performed utilizing techniques to minimize radiation dose exposure, including the use of iterative   reconstruction and automated exposure control       Enteric contrast was not administered       FINDINGS:     CHEST: Left IJ approach central venous catheter with peripheral injection port seen with its tip in the region of the SVC and the right atrium        LUNGS: Lingular scarring/subsegmental atelectasis  Lungs are otherwise clear  There is no tracheal or endobronchial lesion      PLEURA:  Unremarkable      HEART/GREAT VESSELS:  Unremarkable for patient's age      MEDIASTINUM AND KATIANA:  Unremarkable      CHEST WALL AND LOWER NECK:   Unremarkable      ABDOMEN     LIVER/BILIARY TREE:  Unremarkable      GALLBLADDER:  Gallbladder is surgically absent      SPLEEN:  Unremarkable      PANCREAS:  Unremarkable      ADRENAL GLANDS:  Unremarkable      KIDNEYS/URETERS:  Percutaneous right nephroureteral catheter is noted  There is right hydronephrosis which appears more severe than on the prior exam      Simple Renal cysts are again noted        STOMACH AND BOWEL:  Post gastric bypass      APPENDIX:  No findings to suggest appendicitis      ABDOMINOPELVIC CAVITY:  No ascites or free intraperitoneal air  No lymphadenopathy      VESSELS:  Unremarkable for patient's age      PELVIS     REPRODUCTIVE ORGANS:  Unremarkable for patient's age      URINARY BLADDER:  Unremarkable      ABDOMINAL WALL/INGUINAL REGIONS:  Subcutaneous infiltration are again noted in the intra-abdominal wall likely secondary to injection of low molecular weight heparin    Recommend correlation      Stable right external iliac chain adenopathy measuring up to 1 7 cm in short axis     OSSEOUS STRUCTURES:  No acute fracture or destructive osseous lesion      IMPRESSION:     Worsening right-sided hydronephrosis in comparison to the prior exams  Lack of contrast limits evaluation    No obvious fluid collection to suggest an abscess      Similar appearance of right external iliac chain lymphadenopathy         Workstation performed: [id number]     Kishor Singh MD, [address] [address]  1/3/2020  4:01 PM

## 2020-01-03 NOTE — PROGRESS NOTES
Assessment/Plan:    Problem List Items Addressed This Visit        Cardiovascular and Mediastinum    Acute deep vein thrombosis (DVT) of proximal vein of lower extremity (HCC)     On Lovenox 1 5 milligram/kilogram per day  Tolerating well  Will need anticoagulation for life  Genitourinary    Endometrial cancer ([facility] [address] ) - Primary (Chronic)     Patient is scheduled to receive 6th cycle of carboplatin and Taxol on January 6, 2020  Recent CT scan during hospital admission demonstrates stable disease  Disease seem confined to the right pelvic sidewall  I will obtain a PET-CT  If this demonstrates all active disease localized to right pelvic sidewall, she might benefit from tumor directed radiotherapy  We might consider referral to Radiation Oncology accordingly  I plan to see her back with results from PET-CT after current cycle of chemotherapy  She has been cleared to receive cycle 6 of chemotherapy as planned  Relevant Orders    NM PET CT skull base to mid thigh    Ambulatory referral to Radiation Oncology    Acute kidney injury ([facility] [address] )     Creatinine now at 1 2 mg/dL  Avoid nephrotoxics  Encouraged liberal hydration  Continue to monitor  Obstruction of right ureter     Percutaneous nephrostomy tube in place  She will undergo exchanges by interventional radiology every 3 months  Urology had recommended capping which unfortunately resulted in urosepsis  Recommend open drainage  I think it is highly unlikely that her drainage can be internalized in the future given disease burden and chronic parametrial disease  CHIEF COMPLAINT:   Here for consideration of cycle 6  Of chemotherapy for recurrent endometrial cancer      Problem:  Cancer Staging  Endometrial cancer [facility])  Staging form: Corpus Uteri - Carcinoma, AJCC 7th Edition  - Clinical stage from 12/19/2017: FIGO Stage IB (T1b, N0, M0) - Signed by Sharri Birmingham MD on 2/12/2018        Previous therapy: Endometrial cancer ([facility] [address] )    11/17/2017 Initial Diagnosis     Endometrial cancer ([facility] [address] )      12/19/2017 Surgery     Robotic assisted total laparoscopic hysterectomy with bilateral salpingo-oophorectomy and sentinel bilateral pelvic lymph node dissection  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 (4 4 x 3 2 cm tumor, 9 4/15 4mm invasion, NO LVSI, washings revealed atypical cellular changes)      12/19/2017 Genetic Testing     Morrison testing negative      7/8/2019 Recurrence     Presented with right lower extremity DVT and CT demonstrating right pelvic sidewall mass with venous, ureteral and nerve compression causing significant neuropathic pain  Core biopsy demonstrates high-grade carcinoma  Chemotherapy     Taxol 175 mg/m2 and carboplatin AUC 6 every 21 days  Dose was reduced to taxol 135 mg/m2 and carboplatin AUC 5 with cycle 4  She is scheduled for cycle 6 of treatment  Patient ID: Serenity Huerta is a 60 y o  female  HPI  Patient with recurrent endometrial cancer undergoing treatment with carboplatin and Taxol  She has received 5 cycles  Recently admitted to the hospital due to urosepsis  She has right percutaneous nephrostomy in place which prior to admission was capped  Now open to gravity  All infectious symptoms have resolved  She completed antibiotic therapy  She is on Lovenox 1 5 milligram/kilogram per day for DVT  She will need anticoagulation for life  Also acute kidney injury markedly improved prior to discharge with creatinine of 1 2 mg/dL  Denies vaginal bleeding, drainage or discharge  Intermittent grade 1 peripheral neuropathy  No other complaints  The following portions of the patient's history were reviewed and updated as appropriate: allergies, current medications, past family history, past medical history, past social history, past surgical history and problem list     Review of Systems  As above    Twelve point review of systems is otherwise unremarkable  Current Outpatient Medications   Medication Sig Dispense Refill    acetaminophen (TYLENOL) 325 mg tablet Take 2 tablets (650 mg total) by mouth every 6 (six) hours as needed for mild pain, headaches or fever 30 tablet 0    ARIPiprazole (ABILIFY) 10 mg tablet Take 10 mg by mouth daily      aspirin (ECOTRIN LOW STRENGTH) 81 mg EC tablet Take 81 mg by mouth daily      cholecalciferol (VITAMIN D3) 1,000 units tablet Take 1,000 Units by mouth daily      enoxaparin (LOVENOX) 120 mg/0 8 mL Inject 0 8 mL (120 mg total) under the skin daily 30 Syringe 3    folic acid (FOLVITE) 1 mg tablet Take 1 tablet (1 mg total) by mouth daily  0    gemfibrozil (LOPID) 600 mg tablet TAKE 1 TABLET BY MOUTH DAILY 90 tablet 1    MYRBETRIQ 50 MG TB24 TAKE 1 TABLET BY MOUTH EVERY DAY 90 tablet 3    omeprazole (PriLOSEC) 20 mg delayed release capsule Take 20 mg by mouth daily      ondansetron (ZOFRAN) 8 mg tablet Take 1 tablet (8 mg total) by mouth every 8 (eight) hours as needed for nausea or vomiting 30 tablet 1    Pegfilgrastim (NEULASTA DELIVERY KIT SC) Inject under the skin every 21 days      quinapril (ACCUPRIL) 5 mg tablet Take 1 tablet (5 mg total) by mouth daily at bedtime 90 tablet 1    saccharomyces boulardii (FLORASTOR) 250 mg capsule Take 1 capsule (250 mg total) by mouth 2 (two) times a day  0    venlafaxine (EFFEXOR) 75 mg tablet Take 75 mg by mouth 3 (three) times a day        LORazepam (ATIVAN) 1 mg tablet Take 1 tablet (1 mg total) by mouth every 6 (six) hours as needed for anxiety (or nausea) for up to 10 days 10 tablet 0     No current facility-administered medications for this visit  Objective:    Blood pressure 104/76, pulse 86, temperature 98 4 °F (36 9 °C), resp  rate 16, height 4' 11" (1 499 m), weight 84 4 kg (186 lb), not currently breastfeeding  Body mass index is 37 57 kg/m²  Body surface area is 1 79 meters squared      Physical Exam   Constitutional: She is oriented to person, place, and time  No distress  Eyes: No scleral icterus  Cardiovascular: Normal rate and regular rhythm  Exam reveals no friction rub  Pulmonary/Chest: Effort normal and breath sounds normal  No respiratory distress  Abdominal: Soft  She exhibits no distension  There is no tenderness  Right percutaneous nephrostomy tube in place  Neurological: She is alert and oriented to person, place, and time  No results found for:   Lab Results   Component Value Date     10/27/2017    K 4 4 01/03/2020     01/03/2020    CO2 26 01/03/2020    BUN 23 01/03/2020    CREATININE 1 23 01/03/2020    GLUCOSE 219 (H) 12/19/2017    GLUF 90 11/14/2019    CALCIUM 9 3 01/03/2020    CORRECTEDCA 10 2 (H) 12/09/2019    AST 10 01/03/2020    ALT 19 01/03/2020    ALKPHOS 92 01/03/2020    PROT 6 9 10/27/2017    BILITOT 0 3 10/27/2017    EGFR 47 01/03/2020     Lab Results   Component Value Date    WBC 4 51 01/03/2020    HGB 7 8 (L) 01/03/2020    HCT 25 2 (L) 01/03/2020    MCV 98 01/03/2020     01/03/2020     Lab Results   Component Value Date    NEUTROABS 2 48 01/03/2020 12/22/2019 Study Result     CT CHEST, ABDOMEN AND PELVIS WITHOUT IV CONTRAST     INDICATION:   concern for fluid collection, right nephrostomy tube pain, concern for mets      COMPARISON:  10/22/2019, CT abdomen pelvis  CT chest abdomen pelvis dated 9/21/2019     TECHNIQUE: CT examination of the chest, abdomen and pelvis was performed without intravenous contrast   Axial, sagittal, and coronal 2D reformatted images were created from the source data and submitted for interpretation       Radiation dose length product (DLP) for this visit:  841 79 mGy-cm   This examination, like all CT scans performed in the [facility], was performed utilizing techniques to minimize radiation dose exposure, including the use of iterative   reconstruction and automated exposure control       Enteric contrast was not administered     FINDINGS:     CHEST: Left IJ approach central venous catheter with peripheral injection port seen with its tip in the region of the SVC and the right atrium        LUNGS: Lingular scarring/subsegmental atelectasis  Lungs are otherwise clear  There is no tracheal or endobronchial lesion      PLEURA:  Unremarkable      HEART/GREAT VESSELS:  Unremarkable for patient's age      MEDIASTINUM AND KATIANA:  Unremarkable      CHEST WALL AND LOWER NECK:   Unremarkable      ABDOMEN     LIVER/BILIARY TREE:  Unremarkable      GALLBLADDER:  Gallbladder is surgically absent      SPLEEN:  Unremarkable      PANCREAS:  Unremarkable      ADRENAL GLANDS:  Unremarkable      KIDNEYS/URETERS:  Percutaneous right nephroureteral catheter is noted  There is right hydronephrosis which appears more severe than on the prior exam      Simple Renal cysts are again noted        STOMACH AND BOWEL:  Post gastric bypass      APPENDIX:  No findings to suggest appendicitis      ABDOMINOPELVIC CAVITY:  No ascites or free intraperitoneal air  No lymphadenopathy      VESSELS:  Unremarkable for patient's age      PELVIS     REPRODUCTIVE ORGANS:  Unremarkable for patient's age      URINARY BLADDER:  Unremarkable      ABDOMINAL WALL/INGUINAL REGIONS:  Subcutaneous infiltration are again noted in the intra-abdominal wall likely secondary to injection of low molecular weight heparin  Recommend correlation      Stable right external iliac chain adenopathy measuring up to 1 7 cm in short axis     OSSEOUS STRUCTURES:  No acute fracture or destructive osseous lesion      IMPRESSION:     Worsening right-sided hydronephrosis in comparison to the prior exams  Lack of contrast limits evaluation    No obvious fluid collection to suggest an abscess      Similar appearance of right external iliac chain lymphadenopathy         Workstation performed: [id number]     Chaya Dunlap MD, [address]  1/3/2020  4:01 PM

## 2020-01-05 ENCOUNTER — APPOINTMENT (EMERGENCY)
Dept: RADIOLOGY | Facility: HOSPITAL | Age: 65
End: 2020-01-05
Payer: COMMERCIAL

## 2020-01-05 ENCOUNTER — HOSPITAL ENCOUNTER (EMERGENCY)
Facility: HOSPITAL | Age: 65
Discharge: HOME | End: 2020-01-06
Attending: EMERGENCY MEDICINE | Admitting: EMERGENCY MEDICINE
Payer: COMMERCIAL

## 2020-01-05 DIAGNOSIS — N99.528 NEPHROSTOMY COMPLICATION (HCC): Primary | ICD-10-CM

## 2020-01-05 LAB
ALBUMIN SERPL BCP-MCNC: 3 G/DL (ref 3.5–5)
ALP SERPL-CCNC: 85 U/L (ref 46–116)
ALT SERPL W P-5'-P-CCNC: 21 U/L (ref 12–78)
ANION GAP SERPL CALCULATED.3IONS-SCNC: 5 MMOL/L (ref 4–13)
APTT PPP: 39 SECONDS (ref 23–37)
AST SERPL W P-5'-P-CCNC: 37 U/L (ref 5–45)
BASOPHILS # BLD AUTO: 0.03 THOUSANDS/ΜL (ref 0–0.1)
BASOPHILS NFR BLD AUTO: 1 % (ref 0–1)
BILIRUB SERPL-MCNC: 0.35 MG/DL (ref 0.2–1)
BUN SERPL-MCNC: 27 MG/DL (ref 5–25)
CALCIUM SERPL-MCNC: 9.1 MG/DL (ref 8.3–10.1)
CHLORIDE SERPL-SCNC: 112 MMOL/L (ref 100–108)
CO2 SERPL-SCNC: 24 MMOL/L (ref 21–32)
CREAT SERPL-MCNC: 0.9 MG/DL (ref 0.6–1.3)
EOSINOPHIL # BLD AUTO: 0.06 THOUSAND/ΜL (ref 0–0.61)
EOSINOPHIL NFR BLD AUTO: 1 % (ref 0–6)
ERYTHROCYTE [DISTWIDTH] IN BLOOD BY AUTOMATED COUNT: 14.6 % (ref 11.6–15.1)
GFR SERPL CREATININE-BSD FRML MDRD: 69 ML/MIN/1.73SQ M
GLUCOSE SERPL-MCNC: 92 MG/DL (ref 65–140)
HCT VFR BLD AUTO: 24.4 % (ref 34.8–46.1)
HGB BLD-MCNC: 7.7 G/DL (ref 11.5–15.4)
IMM GRANULOCYTES # BLD AUTO: 0.08 THOUSAND/UL (ref 0–0.2)
IMM GRANULOCYTES NFR BLD AUTO: 1 % (ref 0–2)
INR PPP: 0.97 (ref 0.84–1.19)
LYMPHOCYTES # BLD AUTO: 2.01 THOUSANDS/ΜL (ref 0.6–4.47)
LYMPHOCYTES NFR BLD AUTO: 31 % (ref 14–44)
MCH RBC QN AUTO: 30.8 PG (ref 26.8–34.3)
MCHC RBC AUTO-ENTMCNC: 31.6 G/DL (ref 31.4–37.4)
MCV RBC AUTO: 98 FL (ref 82–98)
MONOCYTES # BLD AUTO: 0.59 THOUSAND/ΜL (ref 0.17–1.22)
MONOCYTES NFR BLD AUTO: 9 % (ref 4–12)
NEUTROPHILS # BLD AUTO: 3.69 THOUSANDS/ΜL (ref 1.85–7.62)
NEUTS SEG NFR BLD AUTO: 57 % (ref 43–75)
NRBC BLD AUTO-RTO: 0 /100 WBCS
PLATELET # BLD AUTO: 386 THOUSANDS/UL (ref 149–390)
PMV BLD AUTO: 9.9 FL (ref 8.9–12.7)
POTASSIUM SERPL-SCNC: 5.2 MMOL/L (ref 3.5–5.3)
PROT SERPL-MCNC: 7.7 G/DL (ref 6.4–8.2)
PROTHROMBIN TIME: 12.5 SECONDS (ref 11.6–14.5)
RBC # BLD AUTO: 2.5 MILLION/UL (ref 3.81–5.12)
SODIUM SERPL-SCNC: 141 MMOL/L (ref 136–145)
WBC # BLD AUTO: 6.46 THOUSAND/UL (ref 4.31–10.16)

## 2020-01-05 PROCEDURE — 74177 CT ABD & PELVIS W/CONTRAST: CPT

## 2020-01-05 PROCEDURE — 85025 COMPLETE CBC W/AUTO DIFF WBC: CPT | Performed by: EMERGENCY MEDICINE

## 2020-01-05 PROCEDURE — 80053 COMPREHEN METABOLIC PANEL: CPT | Performed by: EMERGENCY MEDICINE

## 2020-01-05 PROCEDURE — 85610 PROTHROMBIN TIME: CPT | Performed by: EMERGENCY MEDICINE

## 2020-01-05 PROCEDURE — 85730 THROMBOPLASTIN TIME PARTIAL: CPT | Performed by: EMERGENCY MEDICINE

## 2020-01-05 PROCEDURE — 99284 EMERGENCY DEPT VISIT MOD MDM: CPT

## 2020-01-05 PROCEDURE — 36415 COLL VENOUS BLD VENIPUNCTURE: CPT | Performed by: EMERGENCY MEDICINE

## 2020-01-05 RX ORDER — LIDOCAINE HYDROCHLORIDE AND EPINEPHRINE BITARTRATE 20; .01 MG/ML; MG/ML
5 INJECTION, SOLUTION SUBCUTANEOUS ONCE
Status: DISCONTINUED | OUTPATIENT
Start: 2020-01-05 | End: 2020-01-06 | Stop reason: HOSPADM

## 2020-01-05 RX ADMIN — IODIXANOL 100 ML: 320 INJECTION, SOLUTION INTRAVASCULAR at 23:30

## 2020-01-06 ENCOUNTER — HOSPITAL ENCOUNTER (OUTPATIENT)
Dept: INFUSION CENTER | Facility: CLINIC | Age: 65
Discharge: HOME | End: 2020-01-06
Payer: COMMERCIAL

## 2020-01-06 VITALS
OXYGEN SATURATION: 100 % | DIASTOLIC BLOOD PRESSURE: 59 MMHG | SYSTOLIC BLOOD PRESSURE: 118 MMHG | RESPIRATION RATE: 14 BRPM | TEMPERATURE: 97.9 F | BODY MASS INDEX: 37.58 KG/M2 | HEART RATE: 70 BPM | WEIGHT: 186.07 LBS

## 2020-01-06 VITALS
WEIGHT: 186 LBS | TEMPERATURE: 98.1 F | RESPIRATION RATE: 18 BRPM | SYSTOLIC BLOOD PRESSURE: 140 MMHG | HEIGHT: 59 IN | BODY MASS INDEX: 37.5 KG/M2 | HEART RATE: 75 BPM | OXYGEN SATURATION: 97 % | DIASTOLIC BLOOD PRESSURE: 80 MMHG

## 2020-01-06 DIAGNOSIS — D50.8 OTHER IRON DEFICIENCY ANEMIA: Primary | ICD-10-CM

## 2020-01-06 DIAGNOSIS — D64.9 ANEMIA, UNSPECIFIED TYPE: ICD-10-CM

## 2020-01-06 DIAGNOSIS — D64.81 ANEMIA DUE TO CHEMOTHERAPY: Primary | ICD-10-CM

## 2020-01-06 DIAGNOSIS — T45.1X5A ANEMIA DUE TO CHEMOTHERAPY: Primary | ICD-10-CM

## 2020-01-06 DIAGNOSIS — C54.1 ENDOMETRIAL CANCER (HCC): ICD-10-CM

## 2020-01-06 LAB
ABO GROUP BLD: NORMAL
BLD GP AB SCN SERPL QL: NEGATIVE
RH BLD: POSITIVE
SPECIMEN EXPIRATION DATE: NORMAL

## 2020-01-06 PROCEDURE — 86901 BLOOD TYPING SEROLOGIC RH(D): CPT

## 2020-01-06 PROCEDURE — 96415 CHEMO IV INFUSION ADDL HR: CPT

## 2020-01-06 PROCEDURE — 86850 RBC ANTIBODY SCREEN: CPT

## 2020-01-06 PROCEDURE — 86900 BLOOD TYPING SEROLOGIC ABO: CPT

## 2020-01-06 PROCEDURE — 96367 TX/PROPH/DG ADDL SEQ IV INF: CPT

## 2020-01-06 PROCEDURE — 96413 CHEMO IV INFUSION 1 HR: CPT

## 2020-01-06 PROCEDURE — 96375 TX/PRO/DX INJ NEW DRUG ADDON: CPT

## 2020-01-06 PROCEDURE — 99283 EMERGENCY DEPT VISIT LOW MDM: CPT | Performed by: EMERGENCY MEDICINE

## 2020-01-06 PROCEDURE — 96417 CHEMO IV INFUS EACH ADDL SEQ: CPT

## 2020-01-06 PROCEDURE — 96377 APPLICATON ON-BODY INJECTOR: CPT

## 2020-01-06 RX ORDER — DIPHENHYDRAMINE HCL 25 MG
25 TABLET ORAL ONCE
Status: CANCELLED | OUTPATIENT
Start: 2020-01-06

## 2020-01-06 RX ORDER — ACETAMINOPHEN 325 MG/1
650 TABLET ORAL ONCE
Status: CANCELLED | OUTPATIENT
Start: 2020-01-06

## 2020-01-06 RX ORDER — PALONOSETRON 0.05 MG/ML
0.25 INJECTION, SOLUTION INTRAVENOUS ONCE
Status: COMPLETED | OUTPATIENT
Start: 2020-01-06 | End: 2020-01-06

## 2020-01-06 RX ORDER — SODIUM CHLORIDE 9 MG/ML
20 INJECTION, SOLUTION INTRAVENOUS ONCE
Status: COMPLETED | OUTPATIENT
Start: 2020-01-06 | End: 2020-01-06

## 2020-01-06 RX ORDER — SODIUM CHLORIDE 9 MG/ML
20 INJECTION, SOLUTION INTRAVENOUS ONCE
Status: CANCELLED | OUTPATIENT
Start: 2020-01-06

## 2020-01-06 RX ADMIN — DEXAMETHASONE SODIUM PHOSPHATE 20 MG: 10 INJECTION, SOLUTION INTRAMUSCULAR; INTRAVENOUS at 09:30

## 2020-01-06 RX ADMIN — FOSAPREPITANT DIMEGLUMINE 150 MG: 150 INJECTION, POWDER, LYOPHILIZED, FOR SOLUTION INTRAVENOUS at 10:01

## 2020-01-06 RX ADMIN — FAMOTIDINE 20 MG: 10 INJECTION INTRAVENOUS at 09:08

## 2020-01-06 RX ADMIN — DIPHENHYDRAMINE HYDROCHLORIDE 25 MG: 50 INJECTION, SOLUTION INTRAMUSCULAR; INTRAVENOUS at 08:47

## 2020-01-06 RX ADMIN — PALONOSETRON 0.25 MG: 0.05 INJECTION, SOLUTION INTRAVENOUS at 09:37

## 2020-01-06 RX ADMIN — PACLITAXEL 238.8 MG: 6 INJECTION, SOLUTION INTRAVENOUS at 10:39

## 2020-01-06 RX ADMIN — PEGFILGRASTIM 6 MG: KIT SUBCUTANEOUS at 15:02

## 2020-01-06 RX ADMIN — CARBOPLATIN 442 MG: 10 INJECTION, SOLUTION INTRAVENOUS at 13:45

## 2020-01-06 RX ADMIN — SODIUM CHLORIDE 20 ML/HR: 0.9 INJECTION, SOLUTION INTRAVENOUS at 08:20

## 2020-01-06 NOTE — PROGRESS NOTES
Patient here for taxol and carboplatin for treatment of endometrial cancer  Patient resting, reports being tired, patient in ER last night for bleeding at nephrostomy site hemoglobin also 7 7 as of 1/5/20, Rip Etienne aware and patient scheduled for transfusion tomorrow  Vitals stable  Labs reviewed  [place]bell within reach  Will continue to monitor

## 2020-01-06 NOTE — ED PROVIDER NOTES
History  Chief Complaint   Patient presents with    Ostomy Evaluation     Blood noted in and around R nephrostomy site  61-year-old female with history of right nephrostomy for endometrial cancer presenting for small amount of bleeding that she noted for the past hour and a half around heard right nephrostomy site this evening  Patient states that she was asleep in felt some wetness at the site  Does not have any pain there  Does not believe that she dislodged her nephrostomy tube at all  Patient states that she takes Lovenox for prior DVT  Does not have any lightheadedness, dizziness, shortness of breath, chest pain associated with bleeding  States that her urine from the nephrostomy tube has been bloody for the past few days  Had the tube replaced by IR last Monday, states that after that there was about a day or 2 where they urine was yellow in color, however afterwards it returned to bloody  ROS is otherwise negative as below  Is scheduled to have chemotherapy tomorrow, schedule to receive a blood transfusion for chronically low hemoglobins on Tuesday  History provided by:  Patient   used: No        Prior to Admission Medications   Prescriptions Last Dose Informant Patient Reported? Taking?    ARIPiprazole (ABILIFY) 10 mg tablet  Self Yes Yes   Sig: Take 10 mg by mouth daily   LORazepam (ATIVAN) 1 mg tablet   No Yes   Sig: Take 1 tablet (1 mg total) by mouth every 6 (six) hours as needed for anxiety (or nausea) for up to 10 days   MYRBETRIQ 50 MG TB24  Self No Yes   Sig: TAKE 1 TABLET BY MOUTH EVERY DAY   Pegfilgrastim (NEULASTA DELIVERY KIT SC)  Self Yes No   Sig: Inject under the skin every 21 days   acetaminophen (TYLENOL) 325 mg tablet  Self No Yes   Sig: Take 2 tablets (650 mg total) by mouth every 6 (six) hours as needed for mild pain, headaches or fever   aspirin (ECOTRIN LOW STRENGTH) 81 mg EC tablet  Self Yes Yes   Sig: Take 81 mg by mouth daily   cholecalciferol (VITAMIN D3) 1,000 units tablet  Self Yes Yes   Sig: Take 1,000 Units by mouth daily   enoxaparin (LOVENOX) 120 mg/0 8 mL  Self No Yes   Sig: Inject 0 8 mL (120 mg total) under the skin daily   folic acid (FOLVITE) 1 mg tablet  Self No Yes   Sig: Take 1 tablet (1 mg total) by mouth daily   gemfibrozil (LOPID) 600 mg tablet  Self No Yes   Sig: TAKE 1 TABLET BY MOUTH DAILY   omeprazole (PriLOSEC) 20 mg delayed release capsule  Self Yes Yes   Sig: Take 20 mg by mouth daily   ondansetron (ZOFRAN) 8 mg tablet  Self No Yes   Sig: Take 1 tablet (8 mg total) by mouth every 8 (eight) hours as needed for nausea or vomiting   quinapril (ACCUPRIL) 5 mg tablet  Self No Yes   Sig: Take 1 tablet (5 mg total) by mouth daily at bedtime   saccharomyces boulardii (FLORASTOR) 250 mg capsule  Self No Yes   Sig: Take 1 capsule (250 mg total) by mouth 2 (two) times a day   venlafaxine (EFFEXOR) 75 mg tablet  Self Yes Yes   Sig: Take 75 mg by mouth 3 (three) times a day        Facility-Administered Medications: None       Past Medical History:   Diagnosis Date    Anemia     Chemotherapy follow-up examination     Depression     Diabetes mellitus ([facility] )     Endometrial cancer ([facility] ) 12/2017    Hyperglycemia     vx type 2 dm -- last assessed 4/1/14; resolved 11/7/17    Hyperlipidemia     Hypertension     Obesity     last assessed 10/14/17; resolved 11/7/17       Past Surgical History:   Procedure Laterality Date    ABDOMINAL SURGERY      GASTRIC BYPASS    CHOLECYSTECTOMY      at the time of gastric bypass    COLONOSCOPY      CT NEEDLE BIOPSY LYMPH NODE  7/8/2019    FL GUIDED CENTRAL VENOUS ACCESS DEVICE INSERTION  11/12/2019    GASTRIC BYPASS      HYSTERECTOMY Bilateral     total abdominal with salpingo-oophorectomy    IR PICC LINE  9/27/2019    IR PORT PLACEMENT  7/26/2019    IR PORT REMOVAL  9/20/2019    IR TUBE PLACEMENT NEPHROSTOMY  7/26/2019    OOPHORECTOMY Bilateral     ND INSJ TUNNELED CTR VAD W/SUBQ PORT AGE 5 YR/> Left 11/12/2019    Procedure: INSERTION VENOUS PORT ( PORT-A-CATH) IR;  Surgeon: Matthew Nagel DO;  [facility];  Service: Interventional Radiology    NV LAP, RADICAL HYST W/ TUBE&OV, NODE BX N/A 12/19/2017    Procedure: HYSTERECTOMY LAPAROSCOPIC TOTAL ([address]) W/ ROBOTICS; BILATERAL SALPINGOOPHERECTOMY; LYMPH NODE DISSECTION; lysis of adhesions;  Surgeon: Mykel Johns MD;  [facility];  Service: Gynecology Oncology    NV LAP,DIAGNOSTIC ABDOMEN N/A 12/19/2017    Procedure: LAPAROSCOPY DIAGNOSTIC;  Surgeon: Mykel Johns MD;  [facility];  Service: Gynecology Oncology    TONSILLECTOMY      US GUIDED BREAST BIOPSY RIGHT COMPLETE Right 6/28/2019       Family History   Problem Relation Age of Onset    Other Mother         dyslipidemia    Ovarian cancer Mother 48    Lymphoma Father     Bone cancer Maternal Grandfather     No Known Problems Sister     No Known Problems Maternal Grandmother     No Known Problems Paternal Grandmother     No Known Problems Paternal Grandfather     No Known Problems Maternal Aunt     No Known Problems Maternal Aunt     No Known Problems Maternal Aunt     No Known Problems Maternal Aunt     No Known Problems Paternal Aunt     No Known Problems Paternal Aunt     No Known Problems Paternal Aunt     No Known Problems Paternal Aunt     No Known Problems Brother     No Known Problems Brother      I have reviewed and agree with the history as documented  Social History     Tobacco Use    Smoking status: Never Smoker    Smokeless tobacco: Never Used   Substance Use Topics    Alcohol use: Not Currently    Drug use: No        Review of Systems   Constitutional: Negative for chills, fatigue and fever  HENT: Negative for sore throat  Eyes: Negative for redness and visual disturbance  Respiratory: Negative for cough, shortness of breath and wheezing  Cardiovascular: Negative for chest pain and palpitations     Gastrointestinal: Negative for abdominal pain, constipation, diarrhea, nausea and vomiting  Genitourinary: Positive for hematuria  Negative for difficulty urinating, dysuria and flank pain  Musculoskeletal: Negative for back pain and gait problem  Skin: Negative for pallor and rash  Neurological: Negative for syncope, weakness and headaches  Hematological: Bruises/bleeds easily  Psychiatric/Behavioral: Negative for confusion  The patient is not nervous/anxious  Physical Exam  ED Triage Vitals   Temperature Pulse Respirations Blood Pressure SpO2   01/05/20 2205 01/05/20 2205 01/05/20 2205 01/05/20 2205 01/05/20 2205   97 9 °F (36 6 °C) 92 16 149/72 98 %      Temp Source Heart Rate Source Patient Position - Orthostatic VS BP Location FiO2 (%)   01/05/20 2205 -- 01/06/20 0018 01/06/20 0018 --   Oral  Lying Right arm       Pain Score       01/05/20 2205       No Pain             Orthostatic Vital Signs  Vitals:    01/05/20 2205 01/06/20 0018   BP: 149/72 118/59   Pulse: 92 70   Patient Position - Orthostatic VS:  Lying       Physical Exam   Constitutional: She appears well-developed and well-nourished  No distress  HENT:   Head: Normocephalic and atraumatic  Eyes: Pupils are equal, round, and reactive to light  Conjunctivae are normal    Neck: Normal range of motion  Cardiovascular: Normal rate, regular rhythm and normal heart sounds  No murmur heard  Pulmonary/Chest: Effort normal and breath sounds normal  No respiratory distress  Abdominal: Soft  Bowel sounds are normal  She exhibits no distension  There is no tenderness  Musculoskeletal: Normal range of motion  Neurological: She is alert  Skin: Skin is warm and dry  No rash noted  She is not diaphoretic  Psychiatric: She has a normal mood and affect  Nursing note and vitals reviewed        ED Medications  Medications   lidocaine-epinephrine (XYLOCAINE/EPINEPHRINE) 2 %-1:100,000 injection 5 mL (5 mL Infiltration Not Given 1/6/20 0212)   iodixanol (VISIPAQUE) 320 MG/ML injection 100 mL (100 mL Intravenous Given 1/5/20 2330)       Diagnostic Studies  Results Reviewed     Procedure Component Value Units Date/Time    Protime-INR [[id number]]  (Normal) Collected:  01/05/20 2231    Lab Status:  Final result Specimen:  Blood from Arm, Left Updated:  01/05/20 2316     Protime 12 5 seconds      INR 0 97    APTT [[id number]]  (Abnormal) Collected:  01/05/20 2231    Lab Status:  Final result Specimen:  Blood from Arm, Left Updated:  01/05/20 2316     PTT 39 seconds     Comprehensive metabolic panel [[id number]]  (Abnormal) Collected:  01/05/20 2231    Lab Status:  Final result Specimen:  Blood from Arm, Left Updated:  01/05/20 2300     Sodium 141 mmol/L      Potassium 5 2 mmol/L      Chloride 112 mmol/L      CO2 24 mmol/L      ANION GAP 5 mmol/L      BUN 27 mg/dL      Creatinine 0 90 mg/dL      Glucose 92 mg/dL      Calcium 9 1 mg/dL      AST 37 U/L      ALT 21 U/L      Alkaline Phosphatase 85 U/L      Total Protein 7 7 g/dL      Albumin 3 0 g/dL      Total Bilirubin 0 35 mg/dL      eGFR 69 ml/min/1 73sq m     Narrative:       [place] guidelines for Chronic Kidney Disease (CKD):     Stage 1 with normal or high GFR (GFR > 90 mL/min/1 73 square meters)    Stage 2 Mild CKD (GFR = 60-89 mL/min/1 73 square meters)    Stage 3A Moderate CKD (GFR = 45-59 mL/min/1 73 square meters)    Stage 3B Moderate CKD (GFR = 30-44 mL/min/1 73 square meters)    Stage 4 Severe CKD (GFR = 15-29 mL/min/1 73 square meters)    Stage 5 End Stage CKD (GFR <15 mL/min/1 73 square meters)  Note: GFR calculation is accurate only with a steady state creatinine    CBC and differential [[id number]]  (Abnormal) Collected:  01/05/20 2231    Lab Status:  Final result Specimen:  Blood from Arm, Left Updated:  01/05/20 2245     WBC 6 46 Thousand/uL      RBC 2 50 Million/uL      Hemoglobin 7 7 g/dL      Hematocrit 24 4 %      MCV 98 fL      MCH 30 8 pg      MCHC 31 6 g/dL      RDW 14 6 %      MPV 9 9 fL      Platelets 589 Thousands/uL      nRBC 0 /100 WBCs      Neutrophils Relative 57 %      Immat GRANS % 1 %      Lymphocytes Relative 31 %      Monocytes Relative 9 %      Eosinophils Relative 1 %      Basophils Relative 1 %      Neutrophils Absolute 3 69 Thousands/µL      Immature Grans Absolute 0 08 Thousand/uL      Lymphocytes Absolute 2 01 Thousands/µL      Monocytes Absolute 0 59 Thousand/µL      Eosinophils Absolute 0 06 Thousand/µL      Basophils Absolute 0 03 Thousands/µL                  CT abdomen pelvis with contrast   Final Result by Kayla Santos MD (01/06 0020)      1  Right percutaneous nephroureterostomy catheter in place with mild right hydronephrosis despite catheter similar to prior examination  Correlate for catheter malfunction  2   The subcutaneous catheter tract is unremarkable without evidence of significant hematoma  3   Stable mass/lymphadenopathy along the right pelvic sidewall  Workstation performed: [id number]               Procedures  Procedures      ED Course  ED Course as of Jan 06 0330   Alfredito Ke Jan 05, 2020   2248 7 8 2 days ago  Hemoglobin(!): 7 7   Mon Jan 06, 2020   0037 Bleeding at right nephrostomy site is stopped  0045 Discussed with Dr Dary Salazar from gyn-onc, patient does not need to be admitted from their standpoint and is safe for discharge  MDM  Number of Diagnoses or Management Options  Nephrostomy complication [facility]):   Diagnosis management comments: 57-year-old female with history of stage IV endometrial cancer presenting for right nephrostomy tube bleeding around the site for the past about an hour and a half  The patient does not have any significant abdominal pain or other complaints at this time  Is hemodynamically stable  Basic labs show hemoglobin is stable at 7 7, from 7 8 a few days ago  Patient is due to get a blood transfusion in 3 days    No indication for acute transfusion at this time  Patient's bleeding around nephrostomy site stopped spontaneously with no intervention  CT abdomen pelvis showed no hematoma, nephrostomy tube in the correct place  Two continuing to drain urine in the emergency department  Discussed with gyn-onc, no indication for evaluation or admission in the emergency department from their standpoint  Patient was monitored for 2 hours after the bleeding had stopped, continue to be dry around the dressing  Discharged with strict return precautions discussed  Follow up with oncologist as well as primary care physician and IR  Disposition  Final diagnoses:   Nephrostomy complication ([address] )     Time reflects when diagnosis was documented in both MDM as applicable and the Disposition within this note     Time User Action Codes Description Comment    1/6/2020  1:53 AM May Andre Add [[id number]] Nephrostomy complication [facility])       ED Disposition     ED Disposition Condition Date/Time Comment    Discharge Good Mon Jan 6, 2020  2:18 AM Tonya Skip Meth discharge to home/self care  Follow-up Information     Follow up With Specialties Details Why Contact Info Additional Information    Celso Mcburney, DO Internal Medicine, Family Medicine Schedule an appointment as soon as possible for a visit in 1 day For reevaluation as we discussed  [address] Emergency Department Emergency Medicine Go to  As needed [address] [id number]  [phone]  ED, [address], [place], [id number] [phone]          Discharge Medication List as of 1/6/2020  1:53 AM      CONTINUE these medications which have NOT CHANGED    Details   acetaminophen (TYLENOL) 325 mg tablet Take 2 tablets (650 mg total) by mouth every 6 (six) hours as needed for mild pain, headaches or fever, Starting Tue 10/8/2019, Print      ARIPiprazole (ABILIFY) 10 mg tablet Take 10 mg by mouth daily, Historical Med      aspirin (ECOTRIN LOW STRENGTH) 81 mg EC tablet Take 81 mg by mouth daily, Historical Med      cholecalciferol (VITAMIN D3) 1,000 units tablet Take 1,000 Units by mouth daily, Historical Med      enoxaparin (LOVENOX) 120 mg/0 8 mL Inject 0 8 mL (120 mg total) under the skin daily, Starting Wed 80/10/6459, Normal      folic acid (FOLVITE) 1 mg tablet Take 1 tablet (1 mg total) by mouth daily, Starting Tue 10/8/2019, Print      gemfibrozil (LOPID) 600 mg tablet TAKE 1 TABLET BY MOUTH DAILY, Normal      LORazepam (ATIVAN) 1 mg tablet Take 1 tablet (1 mg total) by mouth every 6 (six) hours as needed for anxiety (or nausea) for up to 10 days, Starting Tue 10/8/2019, Until Sun 1/5/2020, Print      MYRBETRIQ 50 MG TB24 TAKE 1 TABLET BY MOUTH EVERY DAY, Normal      omeprazole (PriLOSEC) 20 mg delayed release capsule Take 20 mg by mouth daily, Historical Med      ondansetron (ZOFRAN) 8 mg tablet Take 1 tablet (8 mg total) by mouth every 8 (eight) hours as needed for nausea or vomiting, Starting Wed 7/17/2019, Normal      quinapril (ACCUPRIL) 5 mg tablet Take 1 tablet (5 mg total) by mouth daily at bedtime, Starting Tue 11/19/2019, No Print      saccharomyces boulardii (FLORASTOR) 250 mg capsule Take 1 capsule (250 mg total) by mouth 2 (two) times a day, Starting Tue 10/8/2019, Print      venlafaxine (EFFEXOR) 75 mg tablet Take 75 mg by mouth 3 (three) times a day  , Historical Med      Pegfilgrastim (NEULASTA DELIVERY KIT SC) Inject under the skin every 21 days, Historical Med           No discharge procedures on file  ED Provider  Attending physically available and evaluated Ryan Elise I managed the patient along with the ED Attending      Electronically Signed by         Jem Barton MD  01/06/20 4414

## 2020-01-06 NOTE — DISCHARGE INSTRUCTIONS
Please follow-up with your oncologist, IR  , and primary care physician tomorrow  If the bleeding continues return immediately to the emergency department  If he feel lightheaded or dizzy at all return to the emergency department

## 2020-01-06 NOTE — PLAN OF CARE
Problem: Potential for Falls  Goal: Patient will remain free of falls  Description  INTERVENTIONS:  - Assess patient frequently for physical needs  -  Identify cognitive and physical deficits and behaviors that affect risk of falls  -  [place] fall precautions as indicated by assessment   - Educate patient/family on patient safety including physical limitations  - Instruct patient to call for assistance with activity based on assessment  - Modify environment to reduce risk of injury  - Consider OT/PT consult to assist with strengthening/mobility  Outcome: Progressing     Problem: Knowledge Deficit  Goal: Patient/family/caregiver demonstrates understanding of disease process, treatment plan, medications, and discharge instructions  Description  Complete learning assessment and assess knowledge base    Interventions:  - Provide teaching at level of understanding  - Provide teaching via preferred learning methods  Outcome: Progressing

## 2020-01-06 NOTE — PROGRESS NOTES
Patient tolerated treatment without complications  Neulasta onpro applied RUE  Green indicator light flashing  Patient aware of injection time and when it will be completed  Patient declined AVS  Reviewed upcoming appointments with patient

## 2020-01-06 NOTE — ED ATTENDING ATTESTATION
1/5/2020  [facility], saw and evaluated the patient  I have discussed the patient with the resident/non-physician practitioner and agree with the resident's/non-physician practitioner's findings, Plan of Care, and MDM as documented in the resident's/non-physician practitioner's note, except where noted  All available labs and Radiology studies were reviewed  I was present for key portions of any procedure(s) performed by the resident/non-physician practitioner and I was immediately available to provide assistance  At this point I agree with the current assessment done in the Emergency Department  I have conducted an independent evaluation of this patient a history and physical is as follows:      61 yo female w/nephrostomy tube for ureteral strictures due to gyn CA  Tube was changed by IR on 12/30/19 due to hematuria - this had cleared up initially  Now having hematuria again as well as slow oozing of blood around skin entry site  Nephrostomy has blood urine in tubing and bag  Imp: oozing from around nephrostomy plan: CT abd/pelvis to eval tube placement and any evidence of obstruction  Attempt to control bleeding better        ED Course         Critical Care Time  Procedures

## 2020-01-07 ENCOUNTER — HOSPITAL ENCOUNTER (OUTPATIENT)
Dept: INFUSION CENTER | Facility: CLINIC | Age: 65
Discharge: HOME | End: 2020-01-07
Payer: COMMERCIAL

## 2020-01-07 ENCOUNTER — APPOINTMENT (EMERGENCY)
Dept: RADIOLOGY | Facility: HOSPITAL | Age: 65
DRG: 872 | End: 2020-01-07
Payer: COMMERCIAL

## 2020-01-07 ENCOUNTER — HOSPITAL ENCOUNTER (INPATIENT)
Facility: HOSPITAL | Age: 65
LOS: 4 days | Discharge: HOME | DRG: 872 | End: 2020-01-12
Attending: EMERGENCY MEDICINE | Admitting: OBSTETRICS & GYNECOLOGY
Payer: COMMERCIAL

## 2020-01-07 VITALS
RESPIRATION RATE: 18 BRPM | HEART RATE: 97 BPM | SYSTOLIC BLOOD PRESSURE: 115 MMHG | TEMPERATURE: 98.8 F | DIASTOLIC BLOOD PRESSURE: 56 MMHG | OXYGEN SATURATION: 98 %

## 2020-01-07 DIAGNOSIS — R50.9 FEVER: Primary | ICD-10-CM

## 2020-01-07 DIAGNOSIS — R78.81 BACTEREMIA: ICD-10-CM

## 2020-01-07 DIAGNOSIS — L29.9 ITCHING: ICD-10-CM

## 2020-01-07 DIAGNOSIS — D50.8 OTHER IRON DEFICIENCY ANEMIA: Primary | ICD-10-CM

## 2020-01-07 LAB
ALBUMIN SERPL BCP-MCNC: 3.1 G/DL (ref 3.5–5)
ALP SERPL-CCNC: 85 U/L (ref 46–116)
ALT SERPL W P-5'-P-CCNC: 20 U/L (ref 12–78)
ANION GAP SERPL CALCULATED.3IONS-SCNC: 9 MMOL/L (ref 4–13)
APTT PPP: 40 SECONDS (ref 23–37)
AST SERPL W P-5'-P-CCNC: 21 U/L (ref 5–45)
BACTERIA UR QL AUTO: ABNORMAL /HPF
BASOPHILS # BLD AUTO: 0.02 THOUSANDS/ΜL (ref 0–0.1)
BASOPHILS NFR BLD AUTO: 0 % (ref 0–1)
BILIRUB SERPL-MCNC: 0.35 MG/DL (ref 0.2–1)
BILIRUB UR QL STRIP: NEGATIVE
BUN SERPL-MCNC: 24 MG/DL (ref 5–25)
CALCIUM SERPL-MCNC: 8.7 MG/DL (ref 8.3–10.1)
CHLORIDE SERPL-SCNC: 105 MMOL/L (ref 100–108)
CLARITY UR: ABNORMAL
CO2 SERPL-SCNC: 21 MMOL/L (ref 21–32)
COLOR UR: ABNORMAL
CREAT SERPL-MCNC: 1.26 MG/DL (ref 0.6–1.3)
EOSINOPHIL # BLD AUTO: 0 THOUSAND/ΜL (ref 0–0.61)
EOSINOPHIL NFR BLD AUTO: 0 % (ref 0–6)
ERYTHROCYTE [DISTWIDTH] IN BLOOD BY AUTOMATED COUNT: 16.5 % (ref 11.6–15.1)
GFR SERPL CREATININE-BSD FRML MDRD: 46 ML/MIN/1.73SQ M
GLUCOSE SERPL-MCNC: 113 MG/DL (ref 65–140)
GLUCOSE UR STRIP-MCNC: NEGATIVE MG/DL
HCT VFR BLD AUTO: 28.1 % (ref 34.8–46.1)
HGB BLD-MCNC: 8.9 G/DL (ref 11.5–15.4)
HGB UR QL STRIP.AUTO: ABNORMAL
IMM GRANULOCYTES # BLD AUTO: 0.05 THOUSAND/UL (ref 0–0.2)
IMM GRANULOCYTES NFR BLD AUTO: 1 % (ref 0–2)
INR PPP: 1.14 (ref 0.84–1.19)
KETONES UR STRIP-MCNC: NEGATIVE MG/DL
LACTATE SERPL-SCNC: 0.9 MMOL/L (ref 0.5–2)
LEUKOCYTE ESTERASE UR QL STRIP: ABNORMAL
LYMPHOCYTES # BLD AUTO: 0.4 THOUSANDS/ΜL (ref 0.6–4.47)
LYMPHOCYTES NFR BLD AUTO: 5 % (ref 14–44)
MCH RBC QN AUTO: 29.2 PG (ref 26.8–34.3)
MCHC RBC AUTO-ENTMCNC: 31.7 G/DL (ref 31.4–37.4)
MCV RBC AUTO: 92 FL (ref 82–98)
MONOCYTES # BLD AUTO: 0.21 THOUSAND/ΜL (ref 0.17–1.22)
MONOCYTES NFR BLD AUTO: 2 % (ref 4–12)
NEUTROPHILS # BLD AUTO: 8.12 THOUSANDS/ΜL (ref 1.85–7.62)
NEUTS SEG NFR BLD AUTO: 92 % (ref 43–75)
NITRITE UR QL STRIP: NEGATIVE
NON-SQ EPI CELLS URNS QL MICRO: ABNORMAL /HPF
NRBC BLD AUTO-RTO: 0 /100 WBCS
OTHER STN SPEC: ABNORMAL
PH UR STRIP.AUTO: 5 [PH]
PLATELET # BLD AUTO: 248 THOUSANDS/UL (ref 149–390)
PMV BLD AUTO: 9.7 FL (ref 8.9–12.7)
POTASSIUM SERPL-SCNC: 4.2 MMOL/L (ref 3.5–5.3)
PROCALCITONIN SERPL-MCNC: 0.95 NG/ML
PROT SERPL-MCNC: 7.5 G/DL (ref 6.4–8.2)
PROT UR STRIP-MCNC: NEGATIVE MG/DL
PROTHROMBIN TIME: 14.2 SECONDS (ref 11.6–14.5)
RBC # BLD AUTO: 3.05 MILLION/UL (ref 3.81–5.12)
RBC #/AREA URNS AUTO: ABNORMAL /HPF
SODIUM SERPL-SCNC: 135 MMOL/L (ref 136–145)
SP GR UR STRIP.AUTO: 1 (ref 1–1.03)
UROBILINOGEN UR QL STRIP.AUTO: 0.2 E.U./DL
WBC # BLD AUTO: 8.8 THOUSAND/UL (ref 4.31–10.16)
WBC #/AREA URNS AUTO: ABNORMAL /HPF

## 2020-01-07 PROCEDURE — 87077 CULTURE AEROBIC IDENTIFY: CPT | Performed by: EMERGENCY MEDICINE

## 2020-01-07 PROCEDURE — 87186 SC STD MICRODIL/AGAR DIL: CPT | Performed by: EMERGENCY MEDICINE

## 2020-01-07 PROCEDURE — 93005 ELECTROCARDIOGRAM TRACING: CPT

## 2020-01-07 PROCEDURE — 36430 TRANSFUSION BLD/BLD COMPNT: CPT

## 2020-01-07 PROCEDURE — 71046 X-RAY EXAM CHEST 2 VIEWS: CPT

## 2020-01-07 PROCEDURE — P9016 RBC LEUKOCYTES REDUCED: HCPCS

## 2020-01-07 PROCEDURE — 80053 COMPREHEN METABOLIC PANEL: CPT | Performed by: EMERGENCY MEDICINE

## 2020-01-07 PROCEDURE — 87186 SC STD MICRODIL/AGAR DIL: CPT

## 2020-01-07 PROCEDURE — 87185 SC STD ENZYME DETCJ PER NZM: CPT

## 2020-01-07 PROCEDURE — 87086 URINE CULTURE/COLONY COUNT: CPT

## 2020-01-07 PROCEDURE — 84145 PROCALCITONIN (PCT): CPT | Performed by: EMERGENCY MEDICINE

## 2020-01-07 PROCEDURE — 86923 COMPATIBILITY TEST ELECTRIC: CPT

## 2020-01-07 PROCEDURE — 96360 HYDRATION IV INFUSION INIT: CPT

## 2020-01-07 PROCEDURE — 85730 THROMBOPLASTIN TIME PARTIAL: CPT | Performed by: EMERGENCY MEDICINE

## 2020-01-07 PROCEDURE — 99284 EMERGENCY DEPT VISIT MOD MDM: CPT | Performed by: EMERGENCY MEDICINE

## 2020-01-07 PROCEDURE — 99285 EMERGENCY DEPT VISIT HI MDM: CPT

## 2020-01-07 PROCEDURE — 83605 ASSAY OF LACTIC ACID: CPT | Performed by: EMERGENCY MEDICINE

## 2020-01-07 PROCEDURE — 87040 BLOOD CULTURE FOR BACTERIA: CPT | Performed by: EMERGENCY MEDICINE

## 2020-01-07 PROCEDURE — 87185 SC STD ENZYME DETCJ PER NZM: CPT | Performed by: EMERGENCY MEDICINE

## 2020-01-07 PROCEDURE — 87077 CULTURE AEROBIC IDENTIFY: CPT

## 2020-01-07 PROCEDURE — 85610 PROTHROMBIN TIME: CPT | Performed by: EMERGENCY MEDICINE

## 2020-01-07 PROCEDURE — 36415 COLL VENOUS BLD VENIPUNCTURE: CPT

## 2020-01-07 PROCEDURE — NC001 PR NO CHARGE: Performed by: OBSTETRICS & GYNECOLOGY

## 2020-01-07 PROCEDURE — 81001 URINALYSIS AUTO W/SCOPE: CPT

## 2020-01-07 PROCEDURE — 85025 COMPLETE CBC W/AUTO DIFF WBC: CPT | Performed by: EMERGENCY MEDICINE

## 2020-01-07 RX ORDER — DOCUSATE SODIUM 100 MG/1
100 CAPSULE, LIQUID FILLED ORAL 2 TIMES DAILY
Status: DISCONTINUED | OUTPATIENT
Start: 2020-01-08 | End: 2020-01-10

## 2020-01-07 RX ORDER — DIPHENHYDRAMINE HCL 25 MG
25 TABLET ORAL ONCE
Status: COMPLETED | OUTPATIENT
Start: 2020-01-07 | End: 2020-01-07

## 2020-01-07 RX ORDER — ONDANSETRON 2 MG/ML
4 INJECTION INTRAMUSCULAR; INTRAVENOUS EVERY 6 HOURS PRN
Status: DISCONTINUED | OUTPATIENT
Start: 2020-01-07 | End: 2020-01-12 | Stop reason: HOSPADM

## 2020-01-07 RX ORDER — ARIPIPRAZOLE 10 MG/1
10 TABLET ORAL DAILY
Status: DISCONTINUED | OUTPATIENT
Start: 2020-01-08 | End: 2020-01-12 | Stop reason: HOSPADM

## 2020-01-07 RX ORDER — PANTOPRAZOLE SODIUM 40 MG/1
40 TABLET, DELAYED RELEASE ORAL DAILY
Status: DISCONTINUED | OUTPATIENT
Start: 2020-01-08 | End: 2020-01-12 | Stop reason: HOSPADM

## 2020-01-07 RX ORDER — SODIUM CHLORIDE 9 MG/ML
20 INJECTION, SOLUTION INTRAVENOUS ONCE
Status: COMPLETED | OUTPATIENT
Start: 2020-01-07 | End: 2020-01-07

## 2020-01-07 RX ORDER — ACETAMINOPHEN 325 MG/1
650 TABLET ORAL ONCE
Status: COMPLETED | OUTPATIENT
Start: 2020-01-07 | End: 2020-01-07

## 2020-01-07 RX ORDER — DEXTROSE, SODIUM CHLORIDE, AND POTASSIUM CHLORIDE 5; .45; .15 G/100ML; G/100ML; G/100ML
125 INJECTION INTRAVENOUS CONTINUOUS
Status: DISCONTINUED | OUTPATIENT
Start: 2020-01-07 | End: 2020-01-08

## 2020-01-07 RX ORDER — VENLAFAXINE 37.5 MG/1
75 TABLET ORAL 3 TIMES DAILY
Status: DISCONTINUED | OUTPATIENT
Start: 2020-01-07 | End: 2020-01-12 | Stop reason: HOSPADM

## 2020-01-07 RX ORDER — QUINAPRIL 5 1/1
5 TABLET ORAL
Status: DISCONTINUED | OUTPATIENT
Start: 2020-01-07 | End: 2020-01-08 | Stop reason: CLARIF

## 2020-01-07 RX ADMIN — SODIUM CHLORIDE 1000 ML: 0.9 INJECTION, SOLUTION INTRAVENOUS at 20:37

## 2020-01-07 RX ADMIN — DIPHENHYDRAMINE HCL 25 MG: 25 TABLET ORAL at 12:55

## 2020-01-07 RX ADMIN — ACETAMINOPHEN 650 MG: 325 TABLET, FILM COATED ORAL at 06:50

## 2020-01-07 RX ADMIN — CEFEPIME HYDROCHLORIDE 2000 MG: 2 INJECTION, POWDER, FOR SOLUTION INTRAVENOUS at 22:38

## 2020-01-07 RX ADMIN — SODIUM CHLORIDE 20 ML/HR: 0.9 INJECTION, SOLUTION INTRAVENOUS at 12:45

## 2020-01-07 NOTE — PROGRESS NOTES
Patient to [facility] for Blood Product Transfusion: Complaining of fatigue: Cheeks Flushed: Low grade fever noted: Lab work ( 01/05/19 ) reviewed: Hgb - 7 7: Left PAC accessed without difficulty: Good blood return noted

## 2020-01-08 ENCOUNTER — TELEPHONE (OUTPATIENT)
Dept: NUTRITION | Facility: CLINIC | Age: 65
End: 2020-01-08

## 2020-01-08 PROBLEM — R78.81 BACTEREMIA: Status: ACTIVE | Noted: 2020-01-08

## 2020-01-08 LAB
ABO GROUP BLD BPU: NORMAL
ANION GAP SERPL CALCULATED.3IONS-SCNC: 8 MMOL/L (ref 4–13)
ATRIAL RATE: 104 BPM
ATRIAL RATE: 106 BPM
ATRIAL RATE: 108 BPM
ATRIAL RATE: 113 BPM
BASOPHILS # BLD AUTO: 0.02 THOUSANDS/ΜL (ref 0–0.1)
BASOPHILS NFR BLD AUTO: 0 % (ref 0–1)
BPU ID: NORMAL
BUN SERPL-MCNC: 22 MG/DL (ref 5–25)
CALCIUM SERPL-MCNC: 8.4 MG/DL (ref 8.3–10.1)
CHLORIDE SERPL-SCNC: 107 MMOL/L (ref 100–108)
CO2 SERPL-SCNC: 21 MMOL/L (ref 21–32)
CREAT SERPL-MCNC: 1.15 MG/DL (ref 0.6–1.3)
CROSSMATCH: NORMAL
EOSINOPHIL # BLD AUTO: 0 THOUSAND/ΜL (ref 0–0.61)
EOSINOPHIL NFR BLD AUTO: 0 % (ref 0–6)
ERYTHROCYTE [DISTWIDTH] IN BLOOD BY AUTOMATED COUNT: 16.5 % (ref 11.6–15.1)
GFR SERPL CREATININE-BSD FRML MDRD: 51 ML/MIN/1.73SQ M
GLUCOSE P FAST SERPL-MCNC: 133 MG/DL (ref 65–99)
GLUCOSE SERPL-MCNC: 133 MG/DL (ref 65–140)
HCT VFR BLD AUTO: 26.1 % (ref 34.8–46.1)
HGB BLD-MCNC: 8.5 G/DL (ref 11.5–15.4)
IMM GRANULOCYTES # BLD AUTO: 0.14 THOUSAND/UL (ref 0–0.2)
IMM GRANULOCYTES NFR BLD AUTO: 1 % (ref 0–2)
LYMPHOCYTES # BLD AUTO: 0.32 THOUSANDS/ΜL (ref 0.6–4.47)
LYMPHOCYTES NFR BLD AUTO: 3 % (ref 14–44)
MCH RBC QN AUTO: 29.7 PG (ref 26.8–34.3)
MCHC RBC AUTO-ENTMCNC: 32.6 G/DL (ref 31.4–37.4)
MCV RBC AUTO: 91 FL (ref 82–98)
MONOCYTES # BLD AUTO: 0.2 THOUSAND/ΜL (ref 0.17–1.22)
MONOCYTES NFR BLD AUTO: 2 % (ref 4–12)
NEUTROPHILS # BLD AUTO: 12.05 THOUSANDS/ΜL (ref 1.85–7.62)
NEUTS SEG NFR BLD AUTO: 94 % (ref 43–75)
NRBC BLD AUTO-RTO: 0 /100 WBCS
P AXIS: 45 DEGREES
P AXIS: 50 DEGREES
P AXIS: 52 DEGREES
P AXIS: 76 DEGREES
PLATELET # BLD AUTO: 223 THOUSANDS/UL (ref 149–390)
PMV BLD AUTO: 10.2 FL (ref 8.9–12.7)
POTASSIUM SERPL-SCNC: 3.7 MMOL/L (ref 3.5–5.3)
PR INTERVAL: 128 MS
PR INTERVAL: 130 MS
PR INTERVAL: 132 MS
PR INTERVAL: 138 MS
QRS AXIS: -32 DEGREES
QRS AXIS: -36 DEGREES
QRS AXIS: -43 DEGREES
QRS AXIS: -44 DEGREES
QRSD INTERVAL: 78 MS
QRSD INTERVAL: 80 MS
QRSD INTERVAL: 84 MS
QRSD INTERVAL: 86 MS
QT INTERVAL: 302 MS
QT INTERVAL: 304 MS
QT INTERVAL: 306 MS
QT INTERVAL: 322 MS
QTC INTERVAL: 404 MS
QTC INTERVAL: 406 MS
QTC INTERVAL: 416 MS
QTC INTERVAL: 423 MS
RBC # BLD AUTO: 2.86 MILLION/UL (ref 3.81–5.12)
SODIUM SERPL-SCNC: 136 MMOL/L (ref 136–145)
T WAVE AXIS: 22 DEGREES
T WAVE AXIS: 42 DEGREES
T WAVE AXIS: 73 DEGREES
T WAVE AXIS: 83 DEGREES
UNIT DISPENSE STATUS: NORMAL
UNIT PRODUCT CODE: NORMAL
UNIT RH: NORMAL
VENTRICULAR RATE: 104 BPM
VENTRICULAR RATE: 106 BPM
VENTRICULAR RATE: 108 BPM
VENTRICULAR RATE: 113 BPM
WBC # BLD AUTO: 12.73 THOUSAND/UL (ref 4.31–10.16)

## 2020-01-08 PROCEDURE — 99221 1ST HOSP IP/OBS SF/LOW 40: CPT | Performed by: OBSTETRICS & GYNECOLOGY

## 2020-01-08 PROCEDURE — 80048 BASIC METABOLIC PNL TOTAL CA: CPT | Performed by: OBSTETRICS & GYNECOLOGY

## 2020-01-08 PROCEDURE — 36415 COLL VENOUS BLD VENIPUNCTURE: CPT | Performed by: OBSTETRICS & GYNECOLOGY

## 2020-01-08 PROCEDURE — 93010 ELECTROCARDIOGRAM REPORT: CPT | Performed by: INTERNAL MEDICINE

## 2020-01-08 PROCEDURE — 85025 COMPLETE CBC W/AUTO DIFF WBC: CPT | Performed by: OBSTETRICS & GYNECOLOGY

## 2020-01-08 PROCEDURE — 99255 IP/OBS CONSLTJ NEW/EST HI 80: CPT | Performed by: INTERNAL MEDICINE

## 2020-01-08 RX ORDER — SODIUM CHLORIDE 450 MG/100ML
125 INJECTION, SOLUTION INTRAVENOUS CONTINUOUS
Status: DISCONTINUED | OUTPATIENT
Start: 2020-01-08 | End: 2020-01-09

## 2020-01-08 RX ORDER — ACETAMINOPHEN 325 MG/1
975 TABLET ORAL EVERY 8 HOURS PRN
Status: DISCONTINUED | OUTPATIENT
Start: 2020-01-08 | End: 2020-01-12 | Stop reason: HOSPADM

## 2020-01-08 RX ORDER — QUINAPRIL 5 1/1
5 TABLET ORAL
Status: DISCONTINUED | OUTPATIENT
Start: 2020-01-08 | End: 2020-01-08 | Stop reason: CLARIF

## 2020-01-08 RX ORDER — ACETAMINOPHEN 325 MG/1
975 TABLET ORAL EVERY 6 HOURS PRN
Status: DISCONTINUED | OUTPATIENT
Start: 2020-01-08 | End: 2020-01-08

## 2020-01-08 RX ORDER — LISINOPRIL 5 MG/1
5 TABLET ORAL
Status: DISCONTINUED | OUTPATIENT
Start: 2020-01-08 | End: 2020-01-12 | Stop reason: HOSPADM

## 2020-01-08 RX ADMIN — LISINOPRIL 5 MG: 5 TABLET ORAL at 21:51

## 2020-01-08 RX ADMIN — SODIUM CHLORIDE 125 ML/HR: 0.45 INJECTION, SOLUTION INTRAVENOUS at 23:15

## 2020-01-08 RX ADMIN — ACETAMINOPHEN 975 MG: 325 TABLET ORAL at 15:45

## 2020-01-08 RX ADMIN — CEFEPIME HYDROCHLORIDE 2000 MG: 2 INJECTION, POWDER, FOR SOLUTION INTRAVENOUS at 10:59

## 2020-01-08 RX ADMIN — ENOXAPARIN SODIUM 120 MG: 120 INJECTION SUBCUTANEOUS at 09:52

## 2020-01-08 RX ADMIN — VENLAFAXINE 75 MG: 37.5 TABLET ORAL at 02:44

## 2020-01-08 RX ADMIN — VENLAFAXINE 75 MG: 37.5 TABLET ORAL at 15:46

## 2020-01-08 RX ADMIN — CEFEPIME HYDROCHLORIDE 2000 MG: 2 INJECTION, POWDER, FOR SOLUTION INTRAVENOUS at 22:39

## 2020-01-08 RX ADMIN — VENLAFAXINE 75 MG: 37.5 TABLET ORAL at 21:51

## 2020-01-08 RX ADMIN — DOCUSATE SODIUM 100 MG: 100 CAPSULE, LIQUID FILLED ORAL at 15:45

## 2020-01-08 RX ADMIN — ARIPIPRAZOLE 10 MG: 10 TABLET ORAL at 09:43

## 2020-01-08 RX ADMIN — DEXTROSE, SODIUM CHLORIDE, AND POTASSIUM CHLORIDE 125 ML/HR: 5; .45; .15 INJECTION INTRAVENOUS at 02:45

## 2020-01-08 RX ADMIN — SODIUM CHLORIDE 125 ML/HR: 0.45 INJECTION, SOLUTION INTRAVENOUS at 14:49

## 2020-01-08 RX ADMIN — DOCUSATE SODIUM 100 MG: 100 CAPSULE, LIQUID FILLED ORAL at 09:42

## 2020-01-08 RX ADMIN — ACETAMINOPHEN 975 MG: 325 TABLET ORAL at 08:48

## 2020-01-08 RX ADMIN — PANTOPRAZOLE SODIUM 40 MG: 40 TABLET, DELAYED RELEASE ORAL at 09:43

## 2020-01-08 NOTE — ED NOTES
Per pharmacy, Phoebe Killer not stocked in [facility] ONCO resident paged at this time to relay message     Bruce Back RN  01/08/20 4972

## 2020-01-08 NOTE — QUICK NOTE
Ms Mil Gibson was seen at bedside for afternoon rounds  She is resting comfortably with her daughter in room  She denies any current complaints  She reports she did have an episode for chills a couple of hours ago but denies any current subjective fever, chills, abdominal/back pain, nausea vomiting, CP or SOB  Her abdomen was soft and non-tender  She had mild tachycardia at bedside with HR of 110s, but no hypotension noted  Last documented vitals  /65 (BP Location: Right arm)   Pulse 82   Temp 99 3 °F (37 4 °C) (Oral)   Resp 18   Ht 4' 11" (1 499 m)   Wt 83 9 kg (185 lb)   LMP  (LMP Unknown)   SpO2 97%   Breastfeeding? No   BMI 37 37 kg/m²   Tmax/ last fever: 102 3 F 1/8/2020 at 0841    We discussed her blood culture results of GNR bacteremia and her current plan  Patient and her and her daughter were given time for questions which were answered to their satisfaction  Appreciate ID consultation and following with patient care      Nathaniel Gaffney MD  01/08/20  3:26 PM

## 2020-01-08 NOTE — ED NOTES
250ml emptied from pt nephrostomy bag by [address] [place] [address], RN  01/07/20 0860

## 2020-01-08 NOTE — H&P
H&P Exam - Gynecologic Oncology   Magiessence Forbes Al 60 y o  female MRN: [id number]  Unit/Bed#: ED 09 Encounter: [id number]    Assessment/Plan     Assessment:    60year old with recurrent endometrial cancer receiving palliative chemotherapy with carboplatin and Taxol admitted with new onset fevers  Right pelvic sidewall lymphadenopathy causing a malignant right ureteral obstruction and is s/p right-sided PCN placement    Plan:    1  Fever of unknown origin  -high suspicion of urinary origin secondary to recent history of urosepsis  -Febrile on admission of 101  -CXR negative  -f/u Urine culture and blood cultures  -WBC 8  -Procalcitonin: 0 95  -lactic acid: 0 9   -Will initiate IV Cefepime as patient has had been continuously febrile since presentation in the ED  2  Recurrent endometrial carcinoma  -s/p 6 cycles of Carboplatin/Taxol (last dose was 1/6/20)  3  DVT: requiring lifelong anticoagulation  -will continue therapeutic lovenox  4  Chronic anemia: secondary to chronic disease  -s/p transfusion 1UPRBCs on 1/6/20  -Hb on admission: 8 9  -transfusion if <7  5  Right sided PCN in place, currently open  -Cr on admission 1 26, continue to trend    FEN: regular diet, IVF  SCDs      History of Present Illness     HPI:  Celia Stark is a 60 y o  female who presents with fever  Patient is currently undergoing treatment for recurrent endometrial carcinoma  She received her 6th cycle of chemotherapy on 1/6/2020  She received 1 unit PRBCs on 1/7/2020 after which she noted she was feeling flushed and had her sister take her temperature  She noted temperature was 100 2° and she reported to the ED for evaluation  She has no other symptoms besides weakness and fatigue  She notes that she does not feel any different than her past chemo treatments  She has never had a fever after her chemo treatments before nor has she ever had any adverse reactions to past transfusions        Review of Systems   Constitutional: Positive for fever  Negative for fatigue  +weakness     Eyes: Negative for visual disturbance  Respiratory: Negative for chest tightness, shortness of breath and wheezing  Cardiovascular: Negative for chest pain and leg swelling  Gastrointestinal: Negative for abdominal pain, constipation, diarrhea, nausea and vomiting  Genitourinary: Negative for difficulty urinating, dysuria and flank pain  Skin: Negative for rash and wound  Neurological: Negative for headaches  Historical Information   Previous Oncology History:       Endometrial cancer ([facility] [address] )     11/17/2017 Initial Diagnosis       Endometrial cancer [facility])        12/19/2017 Surgery       Robotic assisted total laparoscopic hysterectomy with bilateral salpingo-oophorectomy and sentinel bilateral pelvic lymph node dissection  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 (4 4 x 3 2 cm tumor, 9 4/15 4mm invasion, NO LVSI, washings revealed atypical cellular changes)        12/19/2017 Genetic Testing       Morrison testing negative        7/8/2019 Recurrence       Presented with right lower extremity DVT and CT demonstrating right pelvic sidewall mass with venous, ureteral and nerve compression causing significant neuropathic pain  Core biopsy demonstrates high-grade carcinoma           Chemotherapy       Taxol 175 mg/m2 and carboplatin AUC 6 every 21 days  Dose was reduced to taxol 135 mg/m2 and carboplatin AUC 5 with cycle 4  She has completed 6 cycles            Past Medical History:   Diagnosis Date    Anemia     Chemotherapy follow-up examination     Depression     Diabetes mellitus ([facility] [address] )     Endometrial cancer ([facility] [address] ) 12/2017    Hyperglycemia     vx type 2 dm -- last assessed 4/1/14; resolved 11/7/17    Hyperlipidemia     Hypertension     Obesity     last assessed 10/14/17; resolved 11/7/17     Past Surgical History:   Procedure Laterality Date    ABDOMINAL SURGERY      GASTRIC BYPASS    CHOLECYSTECTOMY      at the time of gastric bypass    COLONOSCOPY      CT NEEDLE BIOPSY LYMPH NODE  7/8/2019    FL GUIDED CENTRAL VENOUS ACCESS DEVICE INSERTION  11/12/2019    GASTRIC BYPASS      HYSTERECTOMY Bilateral     total abdominal with salpingo-oophorectomy    IR PICC LINE  9/27/2019    IR PORT PLACEMENT  7/26/2019    IR PORT REMOVAL  9/20/2019    IR TUBE PLACEMENT NEPHROSTOMY  7/26/2019    OOPHORECTOMY Bilateral     CT INSJ TUNNELED CTR VAD W/SUBQ PORT AGE 5 YR/> Left 11/12/2019    Procedure: INSERTION VENOUS PORT ( PORT-A-CATH) IR;  Surgeon: Kelsie Delgadillo DO;  [facility];  Service: Interventional Radiology    CT LAP, RADICAL HYST W/ TUBE&OV, NODE BX N/A 12/19/2017    Procedure: HYSTERECTOMY LAPAROSCOPIC TOTAL ([address]) W/ ROBOTICS; BILATERAL SALPINGOOPHERECTOMY; LYMPH NODE DISSECTION; lysis of adhesions;  Surgeon: Greg Cárdenas MD;  [facility];  Service: Gynecology Oncology    CT LAP,DIAGNOSTIC ABDOMEN N/A 12/19/2017    Procedure: LAPAROSCOPY DIAGNOSTIC;  Surgeon: Greg Cárdenas MD;  [facility];  Service: Gynecology Oncology    TONSILLECTOMY      US GUIDED BREAST BIOPSY RIGHT COMPLETE Right 6/28/2019       Family History   Problem Relation Age of Onset    Other Mother         dyslipidemia    Ovarian cancer Mother 48    Lymphoma Father     Bone cancer Maternal Grandfather     No Known Problems Sister     No Known Problems Maternal Grandmother     No Known Problems Paternal Grandmother     No Known Problems Paternal Grandfather     No Known Problems Maternal Aunt     No Known Problems Maternal Aunt     No Known Problems Maternal Aunt     No Known Problems Maternal Aunt     No Known Problems Paternal Aunt     No Known Problems Paternal Aunt     No Known Problems Paternal Aunt     No Known Problems Paternal Aunt     No Known Problems Brother     No Known Problems Brother      Social History   Social History     Substance and Sexual Activity   Alcohol Use Not Currently Social History     Substance and Sexual Activity   Drug Use No     Social History     Tobacco Use   Smoking Status Never Smoker   Smokeless Tobacco Never Used       Meds/Allergies   all current active meds have been reviewed  Allergies   Allergen Reactions    Cephalosporins Rash     Which Cephalosporin reaction was to not specified; however, has tolerated Amoxicillin, Cefazolin, and Cefepime       Objective   Vitals: Blood pressure 144/63, pulse 98, temperature (!) 101 °F (38 3 °C), temperature source Oral, resp  rate 20, height 4' 11" (1 499 m), weight 84 4 kg (186 lb 1 1 oz), SpO2 97 %, not currently breastfeeding  Intake/Output Summary (Last 24 hours) at 1/7/2020 2320  Last data filed at 1/7/2020 2137  Gross per 24 hour   Intake 1000 ml   Output 15 ml   Net 985 ml       Invasive Devices:   Port A Cath 11/12/19 Left Chest (Active)   Access Date 01/07/20 1/7/2020 12:35 PM   Access Time 1235 1/7/2020 12:35 PM   Accessed by: Paola Nielsen RN 1/7/2020 12:35 PM   Size (Gauge) 20 G 1/7/2020 12:35 PM   Needle Length  0 75 inches 1/7/2020 12:35 PM   Line Necessity Reviewed Yes, reviewed with provider 1/7/2020 12:35 PM   Site Assessment Clean;Dry; Intact 1/7/2020  4:14 PM   Line Status Blood return noted; Flushed;Saline locked 1/7/2020  4:14 PM   Dressing Type Gauze 1/7/2020 12:35 PM   Dressing Status Removed 1/7/2020  4:14 PM   Flush Performed Yes 1/7/2020  4:14 PM   De-Accessed Date 01/07/20 1/7/2020  4:14 PM   De-Accessed Time 1615 1/7/2020  4:14 PM       Peripheral IV 01/07/20 Left Antecubital (Active)   Site Assessment Clean;Dry; Intact 1/7/2020  8:14 PM   Dressing Type Transparent 1/7/2020  8:14 PM   Line Status Blood return noted; Flushed 1/7/2020  8:14 PM   Dressing Status Clean;Dry; Intact 1/7/2020  8:14 PM       Percutaneous Nephroureteral Tube (PCNU) Right 1 8 5 Fr 26 Cm (Active)       Physical Exam   Constitutional: She is oriented to person, place, and time  She appears well-developed     Diaphoretic   HENT: Head: Normocephalic and atraumatic  Eyes: Conjunctivae are normal    Cardiovascular: Regular rhythm, S1 normal, S2 normal and normal heart sounds  Pulmonary/Chest: Effort normal and breath sounds normal            Abdominal: Bowel sounds are normal  There is no tenderness  Neurological: She is alert and oriented to person, place, and time  She is not disoriented  Skin:        Psychiatric: She has a normal mood and affect  Her speech is normal and behavior is normal  Judgment and thought content normal  Cognition and memory are normal        Lab Results: I have personally reviewed pertinent reports  Imaging: I have personally reviewed pertinent reports  EKG, Pathology, and Other Studies: I have personally reviewed pertinent reports        Code Status: Level 1 - Full Code

## 2020-01-08 NOTE — TELEPHONE ENCOUNTER
Called pt for scheduled follow up today at 10am   Pt is currently in the ED at [facility]  Left a voice message asking for a call back when she is able to reschedule appt

## 2020-01-08 NOTE — ED NOTES
Double checked pt hx of allergy to cephalosporins and order for cefepime with Dr Gurmeet Rosenbaum prior to admin- per Dr Gurmeet Rosenbaum OK to hang   Pt also reports she has had med in past without issue     Eboni Almonte RN  01/07/20 1323

## 2020-01-08 NOTE — ED NOTES
EKG given to Dr Charles Ruiz by [facility], who then gave EKG to Dr Leslee Barber        [facility]  01/07/20 2057

## 2020-01-08 NOTE — ED ATTENDING ATTESTATION
Rosa Maria Workman DO, saw and evaluated the patient  I have discussed the patient with the resident/non-physician practitioner and agree with the resident's/non-physician practitioner's findings, Plan of Care, and MDM as documented in the resident's/non-physician practitioner's note, except where noted  All available labs and Radiology studies were reviewed  At this point I agree with the current assessment done in the Emergency Department  I have conducted an independent evaluation of this patient including a focused history and a physical exam     ED Note - Ana Melendez 60 y o  female MRN: [id number]  Unit/Bed#: ED 09 Encounter: [id number]    History of Present Illness   HPI  Deep Anguiano is a 60 y o  female who presents for evaluation of fever and general malaise after receiving a blood transfusion earlier today  Current chemotherapy for endometrial cancer  REVIEW OF SYSTEMS  See HPI for further details  12 systems reviewed and otherwise negative except as noted     Historical Information     PAST MEDICAL HISTORY  Past Medical History:   Diagnosis Date    Anemia     Chemotherapy follow-up examination     Depression     Diabetes mellitus ([facility] [address] )     Endometrial cancer ([facility] [address] ) 12/2017    Hyperglycemia     vx type 2 dm -- last assessed 4/1/14; resolved 11/7/17    Hyperlipidemia     Hypertension     Obesity     last assessed 10/14/17; resolved 11/7/17       FAMILY HISTORY  Family History   Problem Relation Age of Onset    Other Mother         dyslipidemia    Ovarian cancer Mother 48    Lymphoma Father     Bone cancer Maternal Grandfather     No Known Problems Sister     No Known Problems Maternal Grandmother     No Known Problems Paternal Grandmother     No Known Problems Paternal Grandfather     No Known Problems Maternal Aunt     No Known Problems Maternal Aunt     No Known Problems Maternal Aunt     No Known Problems Maternal Aunt     No Known Problems Paternal Aunt     No Known Problems Paternal Aunt     No Known Problems Paternal Aunt     No Known Problems Paternal Aunt     No Known Problems Brother     No Known Problems Brother        SOCIAL HISTORY  Social History     Socioeconomic History    Marital status: Single     Spouse name: None    Number of children: None    Years of education: None    Highest education level: None   Occupational History    None   Social Needs    Financial resource strain: None    Food insecurity:     Worry: None     Inability: None    Transportation needs:     Medical: None     Non-medical: None   Tobacco Use    Smoking status: Never Smoker    Smokeless tobacco: Never Used   Substance and Sexual Activity    Alcohol use: Not Currently    Drug use: No    Sexual activity: Not Currently   Lifestyle    Physical activity:     Days per week: None     Minutes per session: None    Stress: None   Relationships    Social connections:     Talks on phone: None     Gets together: None     Attends Zoroastrian service: None     Active member of club or organization: None     Attends meetings of clubs or organizations: None     Relationship status: None    Intimate partner violence:     Fear of current or ex partner: None     Emotionally abused: None     Physically abused: None     Forced sexual activity: None   Other Topics Concern    None   Social History Narrative    None       SURGICAL HISTORY  Past Surgical History:   Procedure Laterality Date    ABDOMINAL SURGERY      GASTRIC BYPASS    CHOLECYSTECTOMY      at the time of gastric bypass    COLONOSCOPY      CT NEEDLE BIOPSY LYMPH NODE  7/8/2019    FL GUIDED CENTRAL VENOUS ACCESS DEVICE INSERTION  11/12/2019    GASTRIC BYPASS      HYSTERECTOMY Bilateral     total abdominal with salpingo-oophorectomy    IR PICC LINE  9/27/2019    IR PORT PLACEMENT  7/26/2019    IR PORT REMOVAL  9/20/2019    IR TUBE PLACEMENT NEPHROSTOMY  7/26/2019    OOPHORECTOMY Bilateral     AL INSJ TUNNELED CTR VAD W/SUBQ PORT AGE 5 YR/> Left 11/12/2019    Procedure: INSERTION VENOUS PORT ( PORT-A-CATH) IR;  Surgeon: Arlin Romano DO;  [facility];  Service: Interventional Radiology    CA LAP, RADICAL HYST W/ TUBE&OV, NODE BX N/A 12/19/2017    Procedure: HYSTERECTOMY LAPAROSCOPIC TOTAL ([address]) W/ ROBOTICS; BILATERAL SALPINGOOPHERECTOMY; LYMPH NODE DISSECTION; lysis of adhesions;  Surgeon: Asim Santiago MD;  [facility];  Service: Gynecology Oncology    CA LAP,DIAGNOSTIC ABDOMEN N/A 12/19/2017    Procedure: LAPAROSCOPY DIAGNOSTIC;  Surgeon: Asim Santiago MD;  [facility];  Service: Gynecology Oncology    TONSILLECTOMY      US GUIDED BREAST BIOPSY RIGHT COMPLETE Right 6/28/2019     Meds/Allergies     CURRENT MEDICATIONS    Current Facility-Administered Medications:     cefepime (MAXIPIME) 2 g/50 mL dextrose IVPB, 2,000 mg, Intravenous, Once, Alexander England MD, Last Rate: 100 mL/hr at 01/07/20 2238, 2,000 mg at 01/07/20 2238    Current Outpatient Medications:     acetaminophen (TYLENOL) 325 mg tablet, Take 2 tablets (650 mg total) by mouth every 6 (six) hours as needed for mild pain, headaches or fever, Disp: 30 tablet, Rfl: 0    ARIPiprazole (ABILIFY) 10 mg tablet, Take 10 mg by mouth daily, Disp: , Rfl:     aspirin (ECOTRIN LOW STRENGTH) 81 mg EC tablet, Take 81 mg by mouth daily, Disp: , Rfl:     cholecalciferol (VITAMIN D3) 1,000 units tablet, Take 1,000 Units by mouth daily, Disp: , Rfl:     enoxaparin (LOVENOX) 120 mg/0 8 mL, Inject 0 8 mL (120 mg total) under the skin daily, Disp: 30 Syringe, Rfl: 3    folic acid (FOLVITE) 1 mg tablet, Take 1 tablet (1 mg total) by mouth daily, Disp: , Rfl: 0    gemfibrozil (LOPID) 600 mg tablet, TAKE 1 TABLET BY MOUTH DAILY, Disp: 90 tablet, Rfl: 1    MYRBETRIQ 50 MG TB24, TAKE 1 TABLET BY MOUTH EVERY DAY, Disp: 90 tablet, Rfl: 3    omeprazole (PriLOSEC) 20 mg delayed release capsule, Take 20 mg by mouth daily, Disp: , Rfl:    ondansetron (ZOFRAN) 8 mg tablet, Take 1 tablet (8 mg total) by mouth every 8 (eight) hours as needed for nausea or vomiting, Disp: 30 tablet, Rfl: 1    Pegfilgrastim (NEULASTA DELIVERY KIT SC), Inject under the skin every 21 days, Disp: , Rfl:     quinapril (ACCUPRIL) 5 mg tablet, Take 1 tablet (5 mg total) by mouth daily at bedtime, Disp: 90 tablet, Rfl: 1    saccharomyces boulardii (FLORASTOR) 250 mg capsule, Take 1 capsule (250 mg total) by mouth 2 (two) times a day, Disp: , Rfl: 0    venlafaxine (EFFEXOR) 75 mg tablet, Take 75 mg by mouth 3 (three) times a day  , Disp: , Rfl:     (Not in a hospital admission)    ALLERGIES  Allergies   Allergen Reactions    Cephalosporins Rash     Which Cephalosporin reaction was to not specified; however, has tolerated Amoxicillin, Cefazolin, and Cefepime     Objective     PHYSICAL EXAM    VITAL SIGNS: Blood pressure 144/63, pulse 98, temperature (!) 101 °F (38 3 °C), temperature source Oral, resp  rate 20, height 4' 11" (1 499 m), weight 84 4 kg (186 lb 1 1 oz), SpO2 97 %, not currently breastfeeding      Constitutional:  Appears well developed and well nourished, no acute distress, non-toxic but ill appearance   Eyes:  PERRL, EOMI, conjunctivae pink, sclerae non-icteric    HENT:  Normocephalic/Atraumatic, no rhinorrhea, mucous membranes moist   Neck: normal range of motion, no tenderness, supple   Respiratory:  No respiratory distress, normal breath sounds   Cardiovascular:  Normal rate, normal rhythm    GI:  Soft, non-tender, non-distended  :  Right sided nephrostomy tube in place, no bleeding or erythema/ discharge, no flank ecchymosis  Musculoskeletal:  No swelling or edema, no tenderness, no deformities  Integument:  Pink, warm, dry, Well hydrated, no rash, no erythema, no bullae   Lymphatic:  No cervical/ tonsillar/ submandibular lymphadenopathy noted   Neurologic:  Awake, Alert & oriented x 3, CN 2-12 intact, no focal neurological deficits, motor function intact  Psychiatric:  Speech and behavior appropriate       ED COURSE and MDM:    Assessment/Plan   Assessment:  Kalpana Miles is a 60 y o  female presents for evaluation of malaise and fever  Current chemotherapy  Plan:  Labs, symptom management, IV fluids, antibiotics as needed, imaging as appropriate, disposition as appropriate  CRITICAL CARE TIME: 0 minutes      Portions of the record may have been created with voice recognition software  Occasional wrong word or "sound a like" substitutions may have occurred due to the inherent limitations of voice recognition software       ED Provider  Electronically Signed by

## 2020-01-08 NOTE — ED NOTES
Patient's sheets and bedding changed  Pt was able to get out of bed to the chair without difficulty  Socks changed to nonslip socks  Patient brushed teeth and is comfortable in bed  Infectious disease aware of consult due to continued fever  Pt has no complaints at this time         Jose Juan Del Rosario RN  01/08/20 5172

## 2020-01-08 NOTE — CONSULTS
Consultation - Infectious Disease   Geraldine Gupta 60 y o  female MRN: [id number]  Unit/Bed#: SANDIP Encounter: [id number]      IMPRESSION & RECOMMENDATIONS:   Impression/Recommendations:  1  Sepsis, POA  Fever and tachycardia  Due to # 2/3  No other appreciable source  ROS and exam otherwise benign  CXR negative  Hemodynamically stable although continues to have high fever  Rec:  · Continue antibiotics as below  · Follow up blood cultures as below  · Follow temperatures closely  · Recheck CBC in AM  · Supportive care as per the primary service  · If continues to have high fever or clinically worsens change antibiotics to meropenem 2g IV Q8    2  GNR bacteremia  Likely due to #3  LFTs normal, recent CT A/P negative for other intraabdominal source  ROS otherwise negative  Low risk for ESBL based on culture review  Rec:  · Continue cefepime for now  · Follow up blood cultures and tailor antibiotics as indicated  · If continues to have high fever or clinically worsens change antibiotics to meropenem 2g IV Q8    3  Probable right pyelonephritis  In setting of #2, recent PCN exchange x2  No pain currently and having good UOP  Rec:  · Continue antibiotics as above  · Follow UOP closely  · Follow  symptoms closely    4  Chronic right ureteral obstruction  Due to # 5  Status post chronic PCN  Recent dislodgement requiring replacement x2     5   Recurrent endometrial cancer  With extensive right pelvic sidewall involvement  Currently on palliative chemotherapy  The above impression and plan was discussed in detail with the patient, her visitor, and the primary service  Antibiotics:  Cefepime #1    Thank you for this consultation  We will follow along with you  HISTORY OF PRESENT ILLNESS:  Reason for Consult: Sepsis    HPI: Geraldine Gupta is a 60 y o  female with recurrent endometrial cancer involving the right pelvic sidewall    She is known to have obstruction of the right ureter and is status post right PCN  She is currently undergoing palliative chemotherapy  She was recently admitted in late December for sepsis due to UTI in the setting of attempt at capping the PCN  It was subsequently left open to gravity drainage  Shortly after discharge on 12/27 she underwent PCN exchange due to kinking and malpositioning  Again on 12/30 she required exchange and repositioning due to dislodgement  She was seen in the ED on 01/05 for bleeding around the right nephrostomy tube  She underwent a CT which showed appropriate placement of the catheter with stable mild right hydronephrosis  On 1/6 she underwent her last chemo infusion  She then presented to the emergency department yesterday with fever and weakness  Upon presentation she was initially afebrile but later developed a fever  She did have tachycardia  Her labs revealed a normal WBC  A chest x-ray showed no pneumonia  She was started empirically on cefepime  Since admission a blood culture has come back growing gram-negative rods  We are asked to comment on further evaluation and management  In performing this consult, I have reviewed prior admission and outpatient visit records in detail  REVIEW OF SYSTEMS:  Having rigors during exam   Denies cough or any abdominal pain  No N/V/D  A complete system-based review of systems is otherwise negative      PAST MEDICAL HISTORY:  Past Medical History:   Diagnosis Date    Anemia     Chemotherapy follow-up examination     Depression     Diabetes mellitus [facility] [address] )     Endometrial cancer ([facility] [address] ) 12/2017    Hyperglycemia     vx type 2 dm -- last assessed 4/1/14; resolved 11/7/17    Hyperlipidemia     Hypertension     Obesity     last assessed 10/14/17; resolved 11/7/17     Past Surgical History:   Procedure Laterality Date    ABDOMINAL SURGERY      GASTRIC BYPASS    CHOLECYSTECTOMY      at the time of gastric bypass    COLONOSCOPY      CT NEEDLE BIOPSY LYMPH NODE  7/8/2019    FL GUIDED CENTRAL VENOUS ACCESS DEVICE INSERTION  2019    GASTRIC BYPASS      HYSTERECTOMY Bilateral     total abdominal with salpingo-oophorectomy    IR PICC LINE  2019    IR PORT PLACEMENT  2019    IR PORT REMOVAL  2019    IR TUBE PLACEMENT NEPHROSTOMY  2019    OOPHORECTOMY Bilateral     VT INSJ TUNNELED CTR VAD W/SUBQ PORT AGE 5 YR/> Left 2019    Procedure: INSERTION VENOUS PORT ( PORT-A-CATH) IR;  Surgeon: Tremayne Chávez DO;  [facility]: [facility];  Service: Interventional Radiology    VT LAP, RADICAL HYST W/ TUBE&OV, NODE BX N/A 2017    Procedure: HYSTERECTOMY LAPAROSCOPIC TOTAL ([address]) W/ ROBOTICS; BILATERAL SALPINGOOPHERECTOMY; LYMPH NODE DISSECTION; lysis of adhesions;  Surgeon: Shasta Dash MD;  [facility];  Service: Gynecology Oncology    VT LAP,DIAGNOSTIC ABDOMEN N/A 2017    Procedure: LAPAROSCOPY DIAGNOSTIC;  Surgeon: Shasta Dash MD;  [facility];  Service: Gynecology Oncology    TONSILLECTOMY      US GUIDED BREAST BIOPSY RIGHT COMPLETE Right 2019       FAMILY HISTORY:  Non-contributory    SOCIAL HISTORY:  Social History     Substance and Sexual Activity   Alcohol Use Not Currently     Social History     Substance and Sexual Activity   Drug Use No     Social History     Tobacco Use   Smoking Status Never Smoker   Smokeless Tobacco Never Used       ALLERGIES:  Allergies   Allergen Reactions    Cephalosporins Rash     Which Cephalosporin reaction was to not specified; however, has tolerated Amoxicillin, Cefazolin, and Cefepime       MEDICATIONS:  All current active medications have been reviewed      PHYSICAL EXAM:  Vitals:  Temp:  [98 6 °F (37 °C)-102 3 °F (39 1 °C)] 98 6 °F (37 °C)  HR:  [] 82  Resp:  [16-26] 18  BP: (102-156)/(56-70) 138/65  SpO2:  [94 %-98 %] 97 %  Temp (24hrs), Av 8 °F (37 7 °C), Min:98 6 °F (37 °C), Max:102 3 °F (39 1 °C)  Current: Temperature: 98 6 °F (37 °C)     Physical Exam:  General: Chronically ill-appearing female, active rigors  Eyes:  Conjunctive clear with no hemorrhages or effusions  Oropharynx:  No ulcers, no lesions  Neck:  Supple, no lymphadenopathy  Chest:  Left anterior chest wall port without erythema or tenderness  Lungs:  Clear to auscultation bilaterally, no accessory muscle use  Cardiac:  Regular rate and rhythm, no murmurs  Abdomen:  Soft, non-tender, non-distended  Extremities:  No peripheral cyanosis, clubbing, or edema  Skin:  No rashes, no ulcers  Neurological:  Moves all four extremities spontaneously, sensation grossly intact  :  Right PCN bag with large volume clear pale yellow urine  Patient declines assessment of PCN exit site due to fatigue, rigors    LABS, IMAGING, & OTHER STUDIES:  Lab Results:  I have personally reviewed pertinent labs  Results from last 7 days   Lab Units 01/08/20 0416 01/07/20 2016 01/05/20 2231 01/03/20  0955   POTASSIUM mmol/L 3 7 4 2 5 2 4 4   CHLORIDE mmol/L 107 105 112* 105   CO2 mmol/L 21 21 24 26   BUN mg/dL 22 24 27* 23   CREATININE mg/dL 1 15 1 26 0 90 1 23   EGFR ml/min/1 73sq m 51 46 69 47   CALCIUM mg/dL 8 4 8 7 9 1 9 3   AST U/L  --  21 37 10   ALT U/L  --  20 21 19   ALK PHOS U/L  --  85 85 92     Results from last 7 days   Lab Units 01/08/20 0416 01/07/20 2016 01/05/20  2231   WBC Thousand/uL 12 73* 8 80 6 46   HEMOGLOBIN g/dL 8 5* 8 9* 7 7*   PLATELETS Thousands/uL 223 248 386     Results from last 7 days   Lab Units 01/07/20 2036 01/07/20 2016   BLOOD CULTURE   --  Received in Microbiology Lab  Culture in Progress  GRAM STAIN RESULT  Gram negative rods*  --        Imaging Studies:   I have personally reviewed pertinent imaging study reports and images in PACS  CXR reviewed personally no pneumonia  EKG, Pathology, and Other Studies:   I have personally reviewed pertinent reports

## 2020-01-08 NOTE — PROGRESS NOTES
Progress Note - OB/GYN   Kennedi Melendez 60 y o  female MRN: [id number]  Unit/Bed#: ED 09 Encounter: [id number]    Assessment:  60 y o  with recurrent endometrial cancer currently receiving palliative chemotherapy (carbo/Taxol) admitted with concerns for sepsis in the setting of new onset fevers     Plan:  1  Fever of unknown origin  - Fevers since admission with Tmax: 102 3 with tachycardia to 115  - Recent history of urosepsis with most likely sepsis source again being urinary  - She is s/p right sided PCN placement due to right pelvic sidewall lymphadenopathy causing a malignant right ureteral obstruction   - UA: moderate leukocytes with large blood and moderate bacteria  - Urine and blood cultures ordered and pending  - CXR: No acute cardiopulmonary disease  - Lactate negative with procalcitonin elevated at 0 95  - WBC on admission 8 8 --> 12 7  - Continue IV cefepime   - ID consult appreciated for further recommendations     2  Recurrent endometrial carcinoma   - s/p 6 cycles of Carboplatin and taxol (last dose 1/6/2020)  - Chemotherapy may be contributing to patient fever but favor infectious origin at this time    3  Anemia of chronic disease  - s/p transfusion of 1UPRBC on 1/6/2020  - Fever may be secondary transfusion reaction but favor infectious origin at this time  - Hgb on admission 8 9 --> 8 5  - Currently asymptomatic other than tachycardia   - Plan to transfuse if hemoglobin less than 7      4  History of DVT:   - Currently on lifetime anticoagulation with therapeutic lovenox  - Continue lovenox at this time  5  Right side PCN, open  - Cr on admission 1 26 --> 1 15   - No evidence of site infection    6  FEN:   - Regular diet  - D5 1/2NS with 20KCl       Subjective/Objective   Subjective: Shayne Michael states that she feels "good"  When asked to clarify, she stated that she feels better than on arrival and no longer feels febrile  She denies any pain at this time  She has been tolerating PO   She denies nausea or vomiting  She has had a slight cough since arrival in the ED but no shortness of breath, difficulty breathing, or chest pain  She denies leg pain  Objective:     Vitals: Blood pressure 145/62, pulse 98, temperature 98 9 °F (37 2 °C), resp  rate (!) 24, height 4' 11" (1 499 m), weight 84 4 kg (186 lb 1 1 oz), SpO2 96 %, not currently breastfeeding  Physical Exam:     Physical Exam   Constitutional: She appears well-developed and well-nourished  She is cooperative  She is easily aroused  She has a sickly appearance  No distress  Cardiovascular: Regular rhythm and normal heart sounds  Tachycardia present  Exam reveals no gallop and no friction rub  No murmur heard  Pulmonary/Chest: Effort normal and breath sounds normal  No accessory muscle usage or stridor  No tachypnea  No respiratory distress  She has no wheezes  She has no rhonchi  She has no rales  Abdominal: Soft  She exhibits no distension  There is no tenderness  There is no guarding  Well healed midline incision  Abdominal bruising "from Lovenox"   Neurological: She is easily aroused  Skin: Skin is warm and dry  Right PCN draining clear- yellow/pink urine    Psychiatric: She has a normal mood and affect  Her behavior is normal    Vitals reviewed  Lab, Imaging and other studies: I have personally reviewed pertinent reports  Lab Results   Component Value Date    WBC 12 73 (H) 01/08/2020    HGB 8 5 (L) 01/08/2020    HCT 26 1 (L) 01/08/2020    MCV 91 01/08/2020     01/08/2020         Giovanna Ko MD  01/08/20    Late entry  Patient evaluated at [address] [place] [address] Rd , [address]

## 2020-01-08 NOTE — ED PROVIDER NOTES
History  Chief Complaint   Patient presents with    Fever - 9 weeks to 76 years     Olga has a history of endometrial cancer and had a transfusion done today; states that all day she has had a fever; states that it was 80 F today      33-year-old female presenting emergency department for evaluation of a fever that started after receiving a transfusion today  Patient has a complicated medical history currently under treatment for ovarian cancer with chemotherapy, last given yesterday  She has been having frequent episodes of anemia gets regular transfusions, most recently today she was transfused a unit of packed blood cells  She was feeling well prior to the transfusion but states that upon returning home, after 4 o'clock this evening started feeling feverish  Multiple temperatures checked at home ranging from 102-103 degrees  She took 1 g of Tylenol before presenting the emergency department  Prior to Admission Medications   Prescriptions Last Dose Informant Patient Reported? Taking?    ARIPiprazole (ABILIFY) 10 mg tablet 1/8/2020 at Unknown time Self Yes Yes   Sig: Take 10 mg by mouth daily   MYRBETRIQ 50 MG TB24 1/7/2020 at Unknown time Self No Yes   Sig: TAKE 1 TABLET BY MOUTH EVERY DAY   Pegfilgrastim (NEULASTA DELIVERY KIT SC)  Self Yes Yes   Sig: Inject under the skin every 21 days   acetaminophen (TYLENOL) 325 mg tablet 1/8/2020 at Unknown time Self No Yes   Sig: Take 2 tablets (650 mg total) by mouth every 6 (six) hours as needed for mild pain, headaches or fever   aspirin (ECOTRIN LOW STRENGTH) 81 mg EC tablet 1/7/2020 at Unknown time Self Yes Yes   Sig: Take 81 mg by mouth daily   cholecalciferol (VITAMIN D3) 1,000 units tablet 1/7/2020 at Unknown time Self Yes Yes   Sig: Take 1,000 Units by mouth daily   enoxaparin (LOVENOX) 120 mg/0 8 mL 1/8/2020 at Unknown time Self No Yes   Sig: Inject 0 8 mL (120 mg total) under the skin daily   folic acid (FOLVITE) 1 mg tablet 1/7/2020 at Unknown time Self No Yes   Sig: Take 1 tablet (1 mg total) by mouth daily   gemfibrozil (LOPID) 600 mg tablet 1/7/2020 at Unknown time Self No Yes   Sig: TAKE 1 TABLET BY MOUTH DAILY   omeprazole (PriLOSEC) 20 mg delayed release capsule 1/8/2020 at Unknown time Self Yes Yes   Sig: Take 20 mg by mouth daily   ondansetron (ZOFRAN) 8 mg tablet Past Week at Unknown time Self No Yes   Sig: Take 1 tablet (8 mg total) by mouth every 8 (eight) hours as needed for nausea or vomiting   quinapril (ACCUPRIL) 5 mg tablet  Self No Yes   Sig: Take 1 tablet (5 mg total) by mouth daily at bedtime   saccharomyces boulardii (FLORASTOR) 250 mg capsule  Self No Yes   Sig: Take 1 capsule (250 mg total) by mouth 2 (two) times a day   venlafaxine (EFFEXOR) 75 mg tablet 1/7/2020 at Unknown time Self Yes Yes   Sig: Take 75 mg by mouth 3 (three) times a day        Facility-Administered Medications: None       Past Medical History:   Diagnosis Date    Anemia     Chemotherapy follow-up examination     Depression     Diabetes mellitus ([facility] )     Endometrial cancer ([facility] [address] ) 12/2017    Hyperglycemia     vx type 2 dm -- last assessed 4/1/14; resolved 11/7/17    Hyperlipidemia     Hypertension     Obesity     last assessed 10/14/17; resolved 11/7/17       Past Surgical History:   Procedure Laterality Date    ABDOMINAL SURGERY      GASTRIC BYPASS    CHOLECYSTECTOMY      at the time of gastric bypass    COLONOSCOPY      CT NEEDLE BIOPSY LYMPH NODE  7/8/2019    FL GUIDED CENTRAL VENOUS ACCESS DEVICE INSERTION  11/12/2019    GASTRIC BYPASS      HYSTERECTOMY Bilateral     total abdominal with salpingo-oophorectomy    IR PICC LINE  9/27/2019    IR PORT PLACEMENT  7/26/2019    IR PORT REMOVAL  9/20/2019    IR TUBE PLACEMENT NEPHROSTOMY  7/26/2019    OOPHORECTOMY Bilateral     GA INSJ TUNNELED CTR VAD W/SUBQ PORT AGE 5 YR/> Left 11/12/2019    Procedure: INSERTION VENOUS PORT ( PORT-A-CATH) IR;  Surgeon: Jessica Harrell DO;  [facility]: [facility]; Service: Interventional Radiology    HI LAP, RADICAL HYST W/ TUBE&OV, NODE BX N/A 12/19/2017    Procedure: HYSTERECTOMY LAPAROSCOPIC TOTAL ([address]) W/ ROBOTICS; BILATERAL SALPINGOOPHERECTOMY; LYMPH NODE DISSECTION; lysis of adhesions;  Surgeon: Andre Guido MD;  [facility]: [facility];  Service: Gynecology Oncology    HI LAP,DIAGNOSTIC ABDOMEN N/A 12/19/2017    Procedure: LAPAROSCOPY DIAGNOSTIC;  Surgeon: Andre Guido MD;  [facility];  Service: Gynecology Oncology    TONSILLECTOMY      US GUIDED BREAST BIOPSY RIGHT COMPLETE Right 6/28/2019       Family History   Problem Relation Age of Onset    Other Mother         dyslipidemia    Ovarian cancer Mother 48    Lymphoma Father     Bone cancer Maternal Grandfather     No Known Problems Sister     No Known Problems Maternal Grandmother     No Known Problems Paternal Grandmother     No Known Problems Paternal Grandfather     No Known Problems Maternal Aunt     No Known Problems Maternal Aunt     No Known Problems Maternal Aunt     No Known Problems Maternal Aunt     No Known Problems Paternal Aunt     No Known Problems Paternal Aunt     No Known Problems Paternal Aunt     No Known Problems Paternal Aunt     No Known Problems Brother     No Known Problems Brother      I have reviewed and agree with the history as documented  Social History     Tobacco Use    Smoking status: Never Smoker    Smokeless tobacco: Never Used   Substance Use Topics    Alcohol use: Not Currently    Drug use: No        Review of Systems   Constitutional: Positive for fever  Negative for chills  HENT: Negative for congestion and sore throat  Eyes: Negative for visual disturbance  Respiratory: Negative for cough and shortness of breath  Cardiovascular: Negative for chest pain and palpitations  Gastrointestinal: Negative for abdominal pain, diarrhea, nausea and vomiting     Genitourinary: Negative for difficulty urinating, dysuria, flank pain, hematuria and urgency  Musculoskeletal: Negative for myalgias  Skin: Negative for rash  Neurological: Negative for weakness, light-headedness, numbness and headaches  Physical Exam  ED Triage Vitals [01/07/20 2007]   Temperature Pulse Respirations Blood Pressure SpO2   (!) 101 °F (38 3 °C) (!) 115 18 125/57 97 %      Temp Source Heart Rate Source Patient Position - Orthostatic VS BP Location FiO2 (%)   Oral Monitor Lying Right arm --      Pain Score       No Pain             Orthostatic Vital Signs  Vitals:    01/09/20 0045 01/09/20 0600 01/09/20 0601 01/09/20 0703   BP:  116/62 117/61 138/69   Pulse: 99 75 78 87   Patient Position - Orthostatic VS:           Physical Exam   Constitutional: She is oriented to person, place, and time  She appears well-developed and well-nourished  No distress  HENT:   Head: Normocephalic and atraumatic  Eyes: Pupils are equal, round, and reactive to light  EOM are normal    Neck: Normal range of motion  Neck supple  Cardiovascular: Regular rhythm, normal heart sounds and intact distal pulses  Tachycardic   Pulmonary/Chest: Effort normal and breath sounds normal  No respiratory distress  She has no wheezes  She has no rales  Abdominal: Soft  Bowel sounds are normal  There is no tenderness  Right-sided nephrostomy tube in place without any bleeding around the entry site  Pink tinged urine present in the bag  Musculoskeletal: Normal range of motion  She exhibits no edema or tenderness  Neurological: She is alert and oriented to person, place, and time  Coordination normal    Skin: Skin is warm and dry  Capillary refill takes less than 2 seconds  No rash noted  Psychiatric: She has a normal mood and affect  Nursing note and vitals reviewed        ED Medications  Medications   ARIPiprazole (ABILIFY) tablet 10 mg (10 mg Oral Given 1/9/20 0826)   enoxaparin (LOVENOX) subcutaneous injection 120 mg (120 mg Subcutaneous Given 1/9/20 0826)   venlafaxine (EFFEXOR) tablet 75 mg (75 mg Oral Given 1/9/20 0826)   ondansetron (ZOFRAN) injection 4 mg (has no administration in time range)   docusate sodium (COLACE) capsule 100 mg (100 mg Oral Refused 1/9/20 0826)   pantoprazole (PROTONIX) EC tablet 40 mg (40 mg Oral Given 1/9/20 0826)   cefepime (MAXIPIME) 2 g/50 mL dextrose IVPB (2,000 mg Intravenous New Bag 1/9/20 1205)   lisinopril (ZESTRIL) tablet 5 mg (5 mg Oral Given 1/8/20 2151)   acetaminophen (TYLENOL) tablet 975 mg (975 mg Oral Given 1/9/20 0050)   sodium chloride infusion 0 45 % (125 mL/hr Intravenous New Bag 1/9/20 0639)   sodium chloride 0 9 % bolus 1,000 mL (0 mL Intravenous Stopped 1/7/20 2137)   cefepime (MAXIPIME) 2 g/50 mL dextrose IVPB (0 mg Intravenous Stopped 1/7/20 2308)   potassium chloride (K-DUR,KLOR-CON) CR tablet 40 mEq (40 mEq Oral Given 1/9/20 0825)       Diagnostic Studies  Results Reviewed     Procedure Component Value Units Date/Time    Blood culture #2 [[id number]]  (Abnormal) Collected:  01/07/20 2016    Lab Status:  Preliminary result Specimen:  Blood from Arm, Left Updated:  01/09/20 1055     Blood Culture Klebsiella pneumoniae     Gram Stain Result Gram negative rods    Blood culture #1 [[id number]]  (Abnormal) Collected:  01/07/20 2036    Lab Status:  Preliminary result Specimen:  Blood from Hand, Left Updated:  01/09/20 1055     Blood Culture Klebsiella pneumoniae     Gram Stain Result Gram negative rods    Urine culture [[id number]]  (Abnormal) Collected:  01/07/20 2105    Lab Status:  Preliminary result Specimen:  Urine, Straight Cath Updated:  01/08/20 2128     Urine Culture >100,000 cfu/ml Gram Negative [place] Enteric Like    CBC and differential [[id number]]  (Abnormal) Collected:  01/08/20 0416    Lab Status:  Final result Specimen:  Blood from Arm, Left Updated:  01/08/20 2378     WBC 12 73 Thousand/uL      RBC 2 86 Million/uL      Hemoglobin 8 5 g/dL      Hematocrit 26 1 %      MCV 91 fL      MCH 29 7 pg      MCHC 32 6 g/dL      RDW 16 5 % MPV 10 2 fL      Platelets 666 Thousands/uL      nRBC 0 /100 WBCs      Neutrophils Relative 94 %      Immat GRANS % 1 %      Lymphocytes Relative 3 %      Monocytes Relative 2 %      Eosinophils Relative 0 %      Basophils Relative 0 %      Neutrophils Absolute 12 05 Thousands/µL      Immature Grans Absolute 0 14 Thousand/uL      Lymphocytes Absolute 0 32 Thousands/µL      Monocytes Absolute 0 20 Thousand/µL      Eosinophils Absolute 0 00 Thousand/µL      Basophils Absolute 0 02 Thousands/µL     Narrative: This is an appended report  These results have been appended to a previously verified report      Basic metabolic panel [[id number]]  (Abnormal) Collected:  01/08/20 0416    Lab Status:  Final result Specimen:  Blood from Arm, Left Updated:  01/08/20 0444     Sodium 136 mmol/L      Potassium 3 7 mmol/L      Chloride 107 mmol/L      CO2 21 mmol/L      ANION GAP 8 mmol/L      BUN 22 mg/dL      Creatinine 1 15 mg/dL      Glucose 133 mg/dL      Glucose, Fasting 133 mg/dL      Calcium 8 4 mg/dL      eGFR 51 ml/min/1 73sq m     Narrative:       [place] guidelines for Chronic Kidney Disease (CKD):     Stage 1 with normal or high GFR (GFR > 90 mL/min/1 73 square meters)    Stage 2 Mild CKD (GFR = 60-89 mL/min/1 73 square meters)    Stage 3A Moderate CKD (GFR = 45-59 mL/min/1 73 square meters)    Stage 3B Moderate CKD (GFR = 30-44 mL/min/1 73 square meters)    Stage 4 Severe CKD (GFR = 15-29 mL/min/1 73 square meters)    Stage 5 End Stage CKD (GFR <15 mL/min/1 73 square meters)  Note: GFR calculation is accurate only with a steady state creatinine    Urine Microscopic [[id number]]  (Abnormal) Collected:  01/07/20 2105    Lab Status:  Final result Specimen:  Urine, Straight Cath Updated:  01/07/20 2155     RBC, UA None Seen /hpf      WBC, UA 30-50 /hpf      Epithelial Cells Occasional /hpf      Bacteria, UA Moderate /hpf      OTHER OBSERVATIONS WBCs Clumped    UA w Reflex to Microscopic w Reflex to Culture [[id number]]  (Abnormal) Collected:  01/07/20 2105    Lab Status:  Final result Specimen:  Urine, Straight Cath Updated:  01/07/20 2155     Color, UA Straw     Clarity, UA Hazy     Specific [place], UA 1 005     pH, UA 5 0     Leukocytes, UA Moderate     Nitrite, UA Negative     Protein, UA Negative mg/dl      Glucose, UA Negative mg/dl      Ketones, UA Negative mg/dl      Urobilinogen, UA 0 2 E U /dl      Bilirubin, UA Negative     Blood, UA Large    CBC and differential [[id number]]  (Abnormal) Collected:  01/07/20 2016    Lab Status:  Final result Specimen:  Blood from Arm, Left Updated:  01/07/20 2109     WBC 8 80 Thousand/uL      RBC 3 05 Million/uL      Hemoglobin 8 9 g/dL      Hematocrit 28 1 %      MCV 92 fL      MCH 29 2 pg      MCHC 31 7 g/dL      RDW 16 5 %      MPV 9 7 fL      Platelets 449 Thousands/uL      nRBC 0 /100 WBCs      Neutrophils Relative 92 %      Immat GRANS % 1 %      Lymphocytes Relative 5 %      Monocytes Relative 2 %      Eosinophils Relative 0 %      Basophils Relative 0 %      Neutrophils Absolute 8 12 Thousands/µL      Immature Grans Absolute 0 05 Thousand/uL      Lymphocytes Absolute 0 40 Thousands/µL      Monocytes Absolute 0 21 Thousand/µL      Eosinophils Absolute 0 00 Thousand/µL      Basophils Absolute 0 02 Thousands/µL     Narrative: This is an appended report  These results have been appended to a previously verified report  Procalcitonin [[id number]]  (Abnormal) Collected:  01/07/20 2016    Lab Status:  Final result Specimen:  Blood from Arm, Left Updated:  01/07/20 2105     Procalcitonin 0 95 ng/ml     Lactic acid x2 [[id number]]  (Normal) Collected:  01/07/20 2016    Lab Status:  Final result Specimen:  Blood from Arm, Left Updated:  01/07/20 2056     LACTIC ACID 0 9 mmol/L     Narrative:       Result may be elevated if tourniquet was used during collection      Comprehensive metabolic panel [[id number]]  (Abnormal) Collected:  01/07/20 2016    Lab Status:  Final result Specimen:  Blood from Arm, Left Updated:  01/07/20 2053     Sodium 135 mmol/L      Potassium 4 2 mmol/L      Chloride 105 mmol/L      CO2 21 mmol/L      ANION GAP 9 mmol/L      BUN 24 mg/dL      Creatinine 1 26 mg/dL      Glucose 113 mg/dL      Calcium 8 7 mg/dL      AST 21 U/L      ALT 20 U/L      Alkaline Phosphatase 85 U/L      Total Protein 7 5 g/dL      Albumin 3 1 g/dL      Total Bilirubin 0 35 mg/dL      eGFR 46 ml/min/1 73sq m     Narrative:       [place] guidelines for Chronic Kidney Disease (CKD):     Stage 1 with normal or high GFR (GFR > 90 mL/min/1 73 square meters)    Stage 2 Mild CKD (GFR = 60-89 mL/min/1 73 square meters)    Stage 3A Moderate CKD (GFR = 45-59 mL/min/1 73 square meters)    Stage 3B Moderate CKD (GFR = 30-44 mL/min/1 73 square meters)    Stage 4 Severe CKD (GFR = 15-29 mL/min/1 73 square meters)    Stage 5 End Stage CKD (GFR <15 mL/min/1 73 square meters)  Note: GFR calculation is accurate only with a steady state creatinine    APTT [[id number]]  (Abnormal) Collected:  01/07/20 2016    Lab Status:  Final result Specimen:  Blood from Arm, Left Updated:  01/07/20 2049     PTT 40 seconds     Protime-INR [[id number]]  (Normal) Collected:  01/07/20 2016    Lab Status:  Final result Specimen:  Blood from Arm, Left Updated:  01/07/20 2049     Protime 14 2 seconds      INR 1 14                 XR chest 2 views   ED Interpretation by Lisa Berkowitz MD (01/07 2133)   No acute cardiopulmonary process  Final Result by Aleksandar Miranda MD (01/08 0901)      No acute cardiopulmonary disease  Workstation performed: [id number]               Procedures  Procedures      ED Course                               MDM  Number of Diagnoses or Management Options  Fever:   Diagnosis management comments: 30-year-old female with ovarian cancer on chemotherapy presenting with a fever after a blood transfusion today    No signs of respiratory distress or pneumonia on exam   Chest x-ray negative  No white count  Mildly elevated procalcitonin  Will check a urinalysis  Discussed with gynecology oncology service would like to admit the patient for observation  Will hold off on antibiotics at this time until a definitive source of the fevers identified, it is possible that this is a post transfusion febrile reaction          Disposition  Final diagnoses:   Fever     Time reflects when diagnosis was documented in both MDM as applicable and the Disposition within this note     Time User Action Codes Description Comment    1/7/2020  9:47 PM Sydnireece Sher Moise [R50 [id number]] Fever       ED Disposition     ED Disposition Condition Date/Time Comment    Admit Stable Tue Jan 7, 2020  9:47 PM Case was discussed with GYN/ONC and the patient's admission status was agreed to be Admission Status: observation status to the service of Dr Jacques Giron         Current Discharge Medication List      CONTINUE these medications which have NOT CHANGED    Details   acetaminophen (TYLENOL) 325 mg tablet Take 2 tablets (650 mg total) by mouth every 6 (six) hours as needed for mild pain, headaches or fever  Qty: 30 tablet, Refills: 0    Associated Diagnoses: Pain      ARIPiprazole (ABILIFY) 10 mg tablet Take 10 mg by mouth daily      aspirin (ECOTRIN LOW STRENGTH) 81 mg EC tablet Take 81 mg by mouth daily      cholecalciferol (VITAMIN D3) 1,000 units tablet Take 1,000 Units by mouth daily      enoxaparin (LOVENOX) 120 mg/0 8 mL Inject 0 8 mL (120 mg total) under the skin daily  Qty: 30 Syringe, Refills: 3    Associated Diagnoses: Acute deep vein thrombosis (DVT) of proximal vein of lower extremity, unspecified laterality (HCC)      folic acid (FOLVITE) 1 mg tablet Take 1 tablet (1 mg total) by mouth daily  Refills: 0    Associated Diagnoses: Anemia      gemfibrozil (LOPID) 600 mg tablet TAKE 1 TABLET BY MOUTH DAILY  Qty: 90 tablet, Refills: 1 Associated Diagnoses: Dyslipidemia      MYRBETRIQ 50 MG TB24 TAKE 1 TABLET BY MOUTH EVERY DAY  Qty: 90 tablet, Refills: 3    Associated Diagnoses: Urinary urgency      omeprazole (PriLOSEC) 20 mg delayed release capsule Take 20 mg by mouth daily      ondansetron (ZOFRAN) 8 mg tablet Take 1 tablet (8 mg total) by mouth every 8 (eight) hours as needed for nausea or vomiting  Qty: 30 tablet, Refills: 1    Associated Diagnoses: Chemotherapy-induced nausea      Pegfilgrastim (NEULASTA DELIVERY KIT SC) Inject under the skin every 21 days      quinapril (ACCUPRIL) 5 mg tablet Take 1 tablet (5 mg total) by mouth daily at bedtime  Qty: 90 tablet, Refills: 1    Associated Diagnoses: Benign essential hypertension      saccharomyces boulardii (FLORASTOR) 250 mg capsule Take 1 capsule (250 mg total) by mouth 2 (two) times a day  Refills: 0    Associated Diagnoses: MSSA bacteremia      venlafaxine (EFFEXOR) 75 mg tablet Take 75 mg by mouth 3 (three) times a day             No discharge procedures on file  ED Provider  Attending physically available and evaluated Ayannaama Bertrandyoan WISE managed the patient along with the ED Attending      Electronically Signed by         Jonathan Thompson MD  01/09/20 4515

## 2020-01-08 NOTE — UTILIZATION REVIEW
Initial Clinical Review    Admission: Date/Time/Statement:  01/07/20 2147  Observation changed to inpatient 1-8-20 1413    Inpatient Admission Once     Transfer Service: GYN Oncology       Question Answer   Admitting Physician Audrey Alpers, [place]   Level of Care Med Surg   Estimated length of stay More than 2 Midnights   Certification I certify that inpatient services are medically necessary for this patient for a duration of greater than two midnights  See H&P and MD Progress Notes for additional information about the patient's course of treatment  ED Arrival Information     Expected Arrival Acuity Means of Arrival Escorted By Service Admission Type    - 1/7/2020 20:05 Emergent Ambulance [address]  Oncology Emergency    Arrival Complaint    Fever        Chief Complaint   Patient presents with    Fever - 9 weeks to 76 years     Cesart has a history of endometrial cancer and had a transfusion done today; states that all day she has had a fever; states that it was 80 F today      Assessment/Plan:    60year old female presents to ed from infusion center evaluation and treatment of fever  Temp today 102 oral   PMHX  Dm, chemotherapy, anemia, endocmetrial cancer, Htn  On arrival  tachycarda, right sided nephrostomy tube shows pink tinged urine,   Urine positive bacteria and wbc,  procalcitonin 1 95  Patient had transfusion earlier in the day  Treated with iv antibiotic in ed  Admit to observation for fever of unknown origin  Plan to continue iv  Cefepime and follow up blood cultures   Consult infectious disease     addendum 1 of 2 blood cultures positive   UrIne culture pending     Addendum  1-8 0851    Gram negative bacteremia  :  Per infectious disease, start ivcefepime   Right pyelonephritis suspected     Addendum  1-8 2128    Blood cultures positive x 2 for  Gram negative enteric like rods and gram negative rods  URIne culture with >100  000 Gram negative enteric like rods Patient experiencing intermittent chills and tachycardia to 110    Continue iv cefepime and iv fluids     ED Triage Vitals [01/07/20 2007]   Temperature Pulse Respirations Blood Pressure SpO2   (!) 101 °F (38 3 °C) (!) 115 18 125/57 97 %      Temp Source Heart Rate Source Patient Position - Orthostatic VS BP Location FiO2 (%)   Oral Monitor Lying Right arm --      Pain Score       No Pain        Wt Readings from Last 1 Encounters:   01/07/20 84 4 kg (186 lb 1 1 oz)     Additional Vital Signs:      Vitals:    01/08/20 0630 01/08/20 0841 01/08/20 0850 01/08/20 1000   BP: 155/70  156/67 145/62   BP Location: Right arm      Pulse: 102  (!) 108 98   Resp: 20  (!) 26 (!) 24   Temp:  (!) 102 3 °F (39 1 °C)  98 9 °F (37 2 °C)   TempSrc:  Oral     SpO2: 95%  96% 96%   Weight:       Height:           Pertinent Labs/Diagnostic Test Results:     XR chest 2 views      Final Result b (01/08 0901)      No acute cardiopulmonary disease             Results from last 7 days   Lab Units 01/08/20 0416 01/07/20 2016 01/05/20 2231 01/03/20  0955   WBC Thousand/uL 12 73* 8 80 6 46 4 51   HEMOGLOBIN g/dL 8 5* 8 9* 7 7* 7 8*   HEMATOCRIT % 26 1* 28 1* 24 4* 25 2*   PLATELETS Thousands/uL 223 248 386 370   NEUTROS ABS Thousands/µL 12 05* 8 12* 3 69 2 48         Results from last 7 days   Lab Units 01/08/20 0416 01/07/20 2016 01/05/20 2231 01/03/20  0955   SODIUM mmol/L 136 135* 141 140   POTASSIUM mmol/L 3 7 4 2 5 2 4 4   CHLORIDE mmol/L 107 105 112* 105   CO2 mmol/L 21 21 24 26   ANION GAP mmol/L 8 9 5 9   BUN mg/dL 22 24 27* 23   CREATININE mg/dL 1 15 1 26 0 90 1 23   EGFR ml/min/1 73sq m 51 46 69 47   CALCIUM mg/dL 8 4 8 7 9 1 9 3   MAGNESIUM mg/dL  --   --   --  1 6     Results from last 7 days   Lab Units 01/07/20 2016 01/05/20 2231 01/03/20  0955   AST U/L 21 37 10   ALT U/L 20 21 19   ALK PHOS U/L 85 85 92   TOTAL PROTEIN g/dL 7 5 7 7 7 2   ALBUMIN g/dL 3 1* 3 0* 3 0*   TOTAL BILIRUBIN mg/dL 0 35 0 35 0 12*         Results from last 7 days   Lab Units 01/08/20  0416 01/07/20 2016 01/05/20  2231 01/03/20  0955   GLUCOSE RANDOM mg/dL 133 113 92 94       Results from last 7 days   Lab Units 01/07/20 2016 01/05/20  2231   PROTIME seconds 14 2 12 5   INR  1 14 0 97   PTT seconds 40* 39*         Results from last 7 days   Lab Units 01/07/20 2016   PROCALCITONIN ng/ml 0 95*     Results from last 7 days   Lab Units 01/07/20 2016   LACTIC ACID mmol/L 0 9     Results from last 7 days   Lab Units 01/07/20  2105   CLARITY UA  Hazy   COLOR UA  Straw   SPEC GRAV UA  1 005   PH UA  5 0   GLUCOSE UA mg/dl Negative   KETONES UA mg/dl Negative   BLOOD UA  Large*   PROTEIN UA mg/dl Negative   NITRITE UA  Negative   BILIRUBIN UA  Negative   UROBILINOGEN UA E U /dl 0 2   LEUKOCYTES UA  Moderate*   WBC UA /hpf 30-50*   RBC UA /hpf None Seen   BACTERIA UA /hpf Moderate*   EPITHELIAL CELLS WET PREP /hpf Occasional       Results from last 7 days   Lab Units 01/07/20 2036 01/07/20 2016   BLOOD CULTURE   --  Received in Microbiology Lab  Culture in Progress     GRAM STAIN RESULT  Gram negative rods*  --                ED Treatment:   Medication Administration from 01/07/2020 2005 to 01/08/2020 1157       Date/Time Order Dose Route Action     01/07/2020 2137 sodium chloride 0 9 % bolus 1,000 mL 0 mL Intravenous Stopped     01/07/2020 2037 sodium chloride 0 9 % bolus 1,000 mL 1,000 mL Intravenous New Bag     01/07/2020 2238 cefepime (MAXIPIME) 2 g/50 mL dextrose IVPB 2,000 mg Intravenous New Bag     01/08/2020 0943 ARIPiprazole (ABILIFY) tablet 10 mg 10 mg Oral Given     01/08/2020 0952 enoxaparin (LOVENOX) subcutaneous injection 120 mg 120 mg Subcutaneous Given     01/08/2020 0244 venlafaxine (EFFEXOR) tablet 75 mg 75 mg Oral Given     01/08/2020 0245 dextrose 5 % and sodium chloride 0 45 % with KCl 20 mEq/L infusion 125 mL/hr Intravenous New Bag     01/08/2020 0942 docusate sodium (COLACE) capsule 100 mg 100 mg Oral Given     01/08/2020 0943 pantoprazole (PROTONIX) EC tablet 40 mg 40 mg Oral Given     01/08/2020 0848 acetaminophen (TYLENOL) tablet 975 mg 975 mg Oral Given     01/08/2020 1059 cefepime (MAXIPIME) 2 g/50 mL dextrose IVPB 2,000 mg Intravenous New Bag        Past Medical History:   Diagnosis Date    Anemia     Chemotherapy follow-up examination     Depression     Diabetes mellitus ([facility] )     [facility]ometrial cancer ([facility] ) 12/2017    Hyperglycemia     vx type 2 dm -- last assessed 4/1/14; resolved 11/7/17    Hyperlipidemia     Hypertension     Obesity     last assessed 10/14/17; resolved 11/7/17     Present on Admission:  **None**      Admitting Diagnosis: Fever [R50 9]  Age/Sex: 60 y o  female  Admission Orders:  Scheduled Medications:    Medications:  ARIPiprazole 10 mg Oral Daily   cefepime 2,000 mg Intravenous Q12H   docusate sodium 100 mg Oral BID   enoxaparin 120 mg Subcutaneous Daily   lisinopril 5 mg Oral HS   pantoprazole 40 mg Oral Daily   venlafaxine 75 mg Oral TID     Continuous IV Infusions:    dextrose 5 % and sodium chloride 0 45 % with KCl 20 mEq/L 125 mL/hr Intravenous Continuous     PRN Meds:    acetaminophen 975 mg Oral Q6H PRN   ondansetron 4 mg Intravenous Q6H PRN       IP CONSULT TO INFECTIOUS DISEASES    Network Utilization Review Department  [email or web address]  ATTENTION: Please call with any questions or concerns to [phone] and carefully listen to the prompts so that you are directed to the right person  All voicemails are confidential   Sallye Jannie all requests for admission clinical reviews, approved or denied determinations and any other requests to dedicated fax number below belonging to the campus where the patient is receiving treatment   List of dedicated fax numbers for the Facilities:  [address] [place] [address] DENIALS (Administrative/Medical Necessity) [phone]   [address] [place] [address] (Maternity/NICU/Pediatrics) [phone]   Stevie Bliss [phone]   Moira Cardoso Chris Álvarez [phone]   Thom Clamp [phone]   [address] [phone]   [facility]  [phone]   [address] [place] [address]  [phone]   [address] [place] [address] [address] [facility] [phone]

## 2020-01-08 NOTE — PROGRESS NOTES
[facility]    Quinapril (Accupril) 5 mg PO daily at bedtime discontinued per protocol  Lisinopril (Zestril) 5 mg PO daily at bedtime ordered per [employer] approved therapeutic substitution policy      Troy Harrell, JacquieD  Pharmacist

## 2020-01-08 NOTE — ED NOTES
+blood culture reported to gyn/onc by Wilber Rodríguez , RN       [address] [place] [address], RN  01/08/20 1811

## 2020-01-09 LAB
ANION GAP SERPL CALCULATED.3IONS-SCNC: 11 MMOL/L (ref 4–13)
BASOPHILS # BLD MANUAL: 0 THOUSAND/UL (ref 0–0.1)
BASOPHILS NFR MAR MANUAL: 0 % (ref 0–1)
BUN SERPL-MCNC: 20 MG/DL (ref 5–25)
CALCIUM SERPL-MCNC: 8.6 MG/DL (ref 8.3–10.1)
CHLORIDE SERPL-SCNC: 109 MMOL/L (ref 100–108)
CO2 SERPL-SCNC: 19 MMOL/L (ref 21–32)
CREAT SERPL-MCNC: 1.06 MG/DL (ref 0.6–1.3)
EOSINOPHIL # BLD MANUAL: 0 THOUSAND/UL (ref 0–0.4)
EOSINOPHIL NFR BLD MANUAL: 0 % (ref 0–6)
ERYTHROCYTE [DISTWIDTH] IN BLOOD BY AUTOMATED COUNT: 15.9 % (ref 11.6–15.1)
GFR SERPL CREATININE-BSD FRML MDRD: 56 ML/MIN/1.73SQ M
GLUCOSE SERPL-MCNC: 95 MG/DL (ref 65–140)
HCT VFR BLD AUTO: 28 % (ref 34.8–46.1)
HGB BLD-MCNC: 8.8 G/DL (ref 11.5–15.4)
LYMPHOCYTES # BLD AUTO: 0.24 THOUSAND/UL (ref 0.6–4.47)
LYMPHOCYTES # BLD AUTO: 3 % (ref 14–44)
MCH RBC QN AUTO: 29.5 PG (ref 26.8–34.3)
MCHC RBC AUTO-ENTMCNC: 31.4 G/DL (ref 31.4–37.4)
MCV RBC AUTO: 94 FL (ref 82–98)
METAMYELOCYTES NFR BLD MANUAL: 13 % (ref 0–1)
MONOCYTES # BLD AUTO: 0.08 THOUSAND/UL (ref 0–1.22)
MONOCYTES NFR BLD: 1 % (ref 4–12)
MYELOCYTES NFR BLD MANUAL: 4 % (ref 0–1)
NEUTROPHILS # BLD MANUAL: 6.05 THOUSAND/UL (ref 1.85–7.62)
NEUTS BAND NFR BLD MANUAL: 8 % (ref 0–8)
NEUTS SEG NFR BLD AUTO: 67 % (ref 43–75)
NRBC BLD AUTO-RTO: 0 /100 WBCS
PLATELET # BLD AUTO: 163 THOUSANDS/UL (ref 149–390)
PLATELET BLD QL SMEAR: ADEQUATE
PMV BLD AUTO: 10.8 FL (ref 8.9–12.7)
POTASSIUM SERPL-SCNC: 3.3 MMOL/L (ref 3.5–5.3)
RBC # BLD AUTO: 2.98 MILLION/UL (ref 3.81–5.12)
SODIUM SERPL-SCNC: 139 MMOL/L (ref 136–145)
VARIANT LYMPHS # BLD AUTO: 4 %
WBC # BLD AUTO: 8.06 THOUSAND/UL (ref 4.31–10.16)

## 2020-01-09 PROCEDURE — 85007 BL SMEAR W/DIFF WBC COUNT: CPT | Performed by: STUDENT IN AN ORGANIZED HEALTH CARE EDUCATION/TRAINING PROGRAM

## 2020-01-09 PROCEDURE — 99232 SBSQ HOSP IP/OBS MODERATE 35: CPT | Performed by: INTERNAL MEDICINE

## 2020-01-09 PROCEDURE — 80048 BASIC METABOLIC PNL TOTAL CA: CPT | Performed by: STUDENT IN AN ORGANIZED HEALTH CARE EDUCATION/TRAINING PROGRAM

## 2020-01-09 PROCEDURE — 85027 COMPLETE CBC AUTOMATED: CPT | Performed by: STUDENT IN AN ORGANIZED HEALTH CARE EDUCATION/TRAINING PROGRAM

## 2020-01-09 PROCEDURE — 99233 SBSQ HOSP IP/OBS HIGH 50: CPT | Performed by: OBSTETRICS & GYNECOLOGY

## 2020-01-09 RX ORDER — POTASSIUM CHLORIDE 20 MEQ/1
40 TABLET, EXTENDED RELEASE ORAL ONCE
Status: COMPLETED | OUTPATIENT
Start: 2020-01-09 | End: 2020-01-09

## 2020-01-09 RX ORDER — POTASSIUM CHLORIDE AND SODIUM CHLORIDE 450; 150 MG/100ML; MG/100ML
125 INJECTION, SOLUTION INTRAVENOUS CONTINUOUS
Status: DISCONTINUED | OUTPATIENT
Start: 2020-01-09 | End: 2020-01-12

## 2020-01-09 RX ADMIN — CEFEPIME HYDROCHLORIDE 2000 MG: 2 INJECTION, POWDER, FOR SOLUTION INTRAVENOUS at 23:58

## 2020-01-09 RX ADMIN — VENLAFAXINE 75 MG: 37.5 TABLET ORAL at 08:26

## 2020-01-09 RX ADMIN — POTASSIUM CHLORIDE AND SODIUM CHLORIDE 125 ML/HR: 450; 150 INJECTION, SOLUTION INTRAVENOUS at 15:37

## 2020-01-09 RX ADMIN — ENOXAPARIN SODIUM 120 MG: 120 INJECTION SUBCUTANEOUS at 08:26

## 2020-01-09 RX ADMIN — ACETAMINOPHEN 975 MG: 325 TABLET ORAL at 00:50

## 2020-01-09 RX ADMIN — CEFEPIME HYDROCHLORIDE 2000 MG: 2 INJECTION, POWDER, FOR SOLUTION INTRAVENOUS at 12:05

## 2020-01-09 RX ADMIN — VENLAFAXINE 75 MG: 37.5 TABLET ORAL at 15:37

## 2020-01-09 RX ADMIN — LISINOPRIL 5 MG: 5 TABLET ORAL at 22:07

## 2020-01-09 RX ADMIN — SODIUM CHLORIDE 125 ML/HR: 0.45 INJECTION, SOLUTION INTRAVENOUS at 06:39

## 2020-01-09 RX ADMIN — VENLAFAXINE 75 MG: 37.5 TABLET ORAL at 22:08

## 2020-01-09 RX ADMIN — ARIPIPRAZOLE 10 MG: 10 TABLET ORAL at 08:26

## 2020-01-09 RX ADMIN — PANTOPRAZOLE SODIUM 40 MG: 40 TABLET, DELAYED RELEASE ORAL at 08:26

## 2020-01-09 RX ADMIN — POTASSIUM CHLORIDE 40 MEQ: 1500 TABLET, EXTENDED RELEASE ORAL at 08:25

## 2020-01-09 NOTE — SOCIAL WORK
Readmission - bacteremia, sent from infusion center    Met with pt and explained CM program/CM role  Resides with sister in 2 story home, 0 RAJANI, bed/bathroom 2nd floor  Independent prior to admission for ADL's/ambulation  PCP Dr Bryce Pearl  Has prescription plan, uses [facility]  DME: cane, RW  SL VNA in the past  Denies IP rehab  History depression, followed OP, no IP admit, denies drug and/or alchol abuse  Primary contact is sister Arlene Aviles [phone]  No POA/Living Will  Family will provide transport home    CM reviewed d/c planning process including the following: identifying help at home, patient preference for d/c planning needs, Discharge Lounge, [facility] Meds to Bed program, availability of treatment team to discuss questions or concerns patient and/or family may have regarding understanding medications and recognizing signs and symptoms once discharged  CM also encouraged patient to follow up with all recommended appointments after discharge  Patient advised of importance for patient and family to participate in managing patients medical well being  Patient/caregiver received discharge checklist  Content reviewed  Patient/caregiver encouraged to participate in discharge plan of care prior to discharge home      Advised PCP will outreach within 3 buysiness days of discharge to schedule appt  Provided and explained [facility] now and Access to Care

## 2020-01-09 NOTE — PROGRESS NOTES
Progress Note - Infectious Disease   Jimmie Melendez 60 y o  female MRN: [id number]  Unit/Bed#: [facility] 914-01 Encounter: [id number]      Impression/Recommendations:  1  Sepsis, POA  Fever and tachycardia  Due to # 2/3  No other appreciable source  ROS and exam otherwise benign  CXR negative  Hemodynamically stable  Slowly improving  Rec:  ? Continue antibiotics as below  ? Follow up blood cultures as below  ? Follow temperatures closely  ? Supportive care as per the primary service     2  Klebsiella bacteremia  Likely due to #3  LFTs normal, recent CT A/P negative for other intraabdominal source  ROS otherwise negative  Low risk for ESBL based on culture review  Rec:  ? Continue cefepime for now  ? Follow up susceptibilities and tailor antibiotics as indicated  ? Once clinically improved and cultures finalized, anticipate transition to PO antibiotics with plan to complete 10 days total of antibiotics through 1/17/20     3  Right pyelonephritis  In setting of #2, recent PCN exchange x2  No pain currently and having good UOP  Rec:  ? Continue antibiotics as above  ? Follow UOP closely  ? Follow  symptoms closely     4  Chronic right ureteral obstruction  Due to # 5  Status post chronic PCN  Recent routine exchange, then dislodgement requiring replacement        5   Recurrent endometrial cancer  With extensive right pelvic sidewall involvement  Currently on palliative chemotherapy      The above impression and plan was discussed in detail with the patient, her visitor, and the primary service      Antibiotics:  Cefepime #2    Subjective:  Patient seen on AM rounds  Fever overnight but feels better this morning  Denies fevers, chills, sweats, nausea, vomiting, or diarrhea  Good UOP from PCN  No pain  24 Hour Events:  Fever curve trending down overnight  No documented nausea, vomiting, or diarrhea    WBC now normal     Objective:  Vitals:  Temp:  [97 8 °F (36 6 °C)-102 7 °F (39 3 °C)] 97 9 °F (36 6 °C)  HR:  [] 87  Resp:  [18-24] 18  BP: (116-145)/(61-77) 138/69  SpO2:  [94 %-98 %] 98 %  Temp (24hrs), Av 4 °F (37 4 °C), Min:97 8 °F (36 6 °C), Max:102 7 °F (39 3 °C)  Current: Temperature: 97 9 °F (36 6 °C)    Physical Exam:   General:  No acute distress  Psychiatric:  Awake and alert  Pulmonary:  Normal respiratory excursion without accessory muscle use  Abdomen:  Soft, nontender  Extremities:  No edema  Skin:  No rashes  :  Pale yellow urine right PCN    Lab Results:  I have personally reviewed pertinent labs  Results from last 7 days   Lab Units 20  0539 20  04120  0955   POTASSIUM mmol/L 3 3* 3 7 4 2 5 2 4 4   CHLORIDE mmol/L 109* 107 105 112* 105   CO2 mmol/L 19* 21 21 24 26   BUN mg/dL 20 22 24 27* 23   CREATININE mg/dL 1 06 1 15 1 26 0 90 1 23   EGFR ml/min/1 73sq m 56 51 46 69 47   CALCIUM mg/dL 8 6 8 4 8 7 9 1 9 3   AST U/L  --   --  21 37 10   ALT U/L  --   --  20 21 19   ALK PHOS U/L  --   --  85 85 92     Results from last 7 days   Lab Units 20  0539 20   WBC Thousand/uL 8 06 12 73* 8 80   HEMOGLOBIN g/dL 8 8* 8 5* 8 9*   PLATELETS Thousands/uL 163 223 248     Results from last 7 days   Lab Units 20  2105 20   BLOOD CULTURE   --  Gram Negative Isrrael Enteric Like* Gram Negative Isrrael Enteric Like*   GRAM STAIN RESULT   --  Gram negative rods* Gram negative rods*   URINE CULTURE  >100,000 cfu/ml Gram Negative Isrrael Enteric Like*  --   --        Imaging Studies:   I have personally reviewed pertinent imaging study reports and images in PACS  EKG, Pathology, and Other Studies:   I have personally reviewed pertinent reports

## 2020-01-09 NOTE — PROGRESS NOTES
Gyn Progress Note   Jimmie Melendez 60 y o  female MRN: [id number]  Unit/Bed#: [facility] 914-01 Encounter: [id number]    Assessment/Plan:  74-year-old female with recurrent stage IB1 grade 1 endometrial cancer with sepsis and GNR bacteremia, likely due to urinary source  Hospital Day #3  1) Sepsis (Improving) with gram-negative laly bacteremia   - Last fever 101 1 at 0045, afebrile this AM   - Improved tachycardia   - Blood and urine cultures both growingGNR, Follow up final blood cultures to tailor antibiotics appropriately   - Appreciate ID input- will continue Cefepime 2 g Q 12 hours unless worsens or high fever persists with recommendations to switched to meropenem  ID continues to follow  - AM Labs pending    2) Recurrent stage IB1 endometrial carcinoma    - s/p 6 cycles of Carboplatin and taxol (last dose 1/6/2020)   - no ongoing treatment at this time, await PET-CT outpatient setting for  consideration of pelvic radiotherapy     3  Anemia of chronic disease   - s/p transfusion of 1UPRBC on 1/6/2020   - Hgb on admission 8 9 --> 8 5 --> 8 8 this AM   - Plan to transfuse if hemoglobin less than 7       4  History of DVT:    - Currently on lifetime anticoagulation with therapeutic lovenox   - Continue lovenox at this time       5  Right side PCNU, open- patient had recent admission for urosepsis for bag was placed back to drainage    - Cr 1 26 --> 1 15 --> continue to monitor, follow-up a m  Labs   - Excellent output overnight (approx  100 cc/hr)     6  FEN:    - Regular diet   - 0 45  ml/hr    7  DVT Ppx: Refuses SCDs, Lovenox, Ambulation with assistance    Disposition:  Continue inpatient management    Subjective:   Ms Tess Dykes was seen at bedside resting comfortably  She reports she overall feels better this morning  Febrile episode noted overnight  She denies any abdominal or back pain, chest pain shortness of breath, dysuria, diarrhea or constipation, nausea, vomiting or calf pain    She is tolerating PO  Encourage patient OOB to chair and ambulation with assistance  /61   Pulse 78   Temp 97 8 °F (36 6 °C)   Resp 18   Ht 4' 11" (1 499 m)   Wt 83 9 kg (185 lb)   LMP  (LMP Unknown)   SpO2 96%   Breastfeeding? No   BMI 37 37 kg/m²     Lab Results   Component Value Date    WBC 12 73 (H) 01/08/2020    HGB 8 5 (L) 01/08/2020    HCT 26 1 (L) 01/08/2020    MCV 91 01/08/2020     01/08/2020       Lab Results   Component Value Date    GLUCOSE 219 (H) 12/19/2017    CALCIUM 8 4 01/08/2020     10/27/2017    K 3 7 01/08/2020    CO2 21 01/08/2020     01/08/2020    BUN 22 01/08/2020    CREATININE 1 15 01/08/2020       Lab Results   Component Value Date/Time    POCGLU 142 (H) 07/26/2019 09:33 AM    POCGLU 107 07/09/2019 11:48 AM    POCGLU 153 (H) 07/09/2019 08:16 AM    POCGLU 100 07/08/2019 08:57 PM    POCGLU 141 (H) 07/08/2019 04:46 PM       I/O last 3 completed shifts: In: 2800 4 [P O :240; I V :1510 4; IV Piggyback:1050]  Out: 2179 [Urine:2740]  I/O this shift:  In: 637 5 [I V :637 5]  Out: 750 [Urine:750]      Physical Exam  Physical Exam   Constitutional: She is oriented to person, place, and time  No distress  Pleasant, ill-appearing  female   HENT:   Head: Normocephalic and atraumatic  Cardiovascular: Normal rate, regular rhythm and normal heart sounds  Exam reveals no gallop and no friction rub  No murmur heard  Pulmonary/Chest: Effort normal and breath sounds normal  No respiratory distress  She has no wheezes  She has no rales  Course upper respiratory sounds   Abdominal: Soft  There is no tenderness  There is no rebound and no guarding  Neurological: She is alert and oriented to person, place, and time  Skin: Skin is warm and dry  She is not diaphoretic  Psychiatric: She has a normal mood and affect  Her behavior is normal    Vitals reviewed        Olu Hatch MD   Gyn PGY-3  1/9/2020 6:13 AM

## 2020-01-09 NOTE — PLAN OF CARE
Problem: Potential for Falls  Goal: Patient will remain free of falls  Description  INTERVENTIONS:  - Assess patient frequently for physical needs  -  Identify cognitive and physical deficits and behaviors that affect risk of falls  -  [place] fall precautions as indicated by assessment   - Educate patient/family on patient safety including physical limitations  - Instruct patient to call for assistance with activity based on assessment  - Modify environment to reduce risk of injury  - Consider OT/PT consult to assist with strengthening/mobility  Outcome: Progressing     Problem: Nutrition/Hydration-ADULT  Goal: Nutrient/Hydration intake appropriate for improving, restoring or maintaining nutritional needs  Description  Monitor and assess patient's nutrition/hydration status for malnutrition  Collaborate with interdisciplinary team and initiate plan and interventions as ordered  Monitor patient's weight and dietary intake as ordered or per policy  Utilize nutrition screening tool and intervene as necessary  Determine patient's food preferences and provide high-protein, high-caloric foods as appropriate       INTERVENTIONS:  - Monitor oral intake, urinary output, labs, and treatment plans  - Assess nutrition and hydration status and recommend course of action  - Evaluate amount of meals eaten  - Assist patient with eating if necessary   - Allow adequate time for meals  - Recommend/ encourage appropriate diets, oral nutritional supplements, and vitamin/mineral supplements  - Order, calculate, and assess calorie counts as needed  - Recommend, monitor, and adjust tube feedings and TPN/PPN based on assessed needs  - Assess need for intravenous fluids  - Provide specific nutrition/hydration education as appropriate  - Include patient/family/caregiver in decisions related to nutrition  Outcome: Progressing     Problem: Prexisting or High Potential for Compromised Skin Integrity  Goal: Skin integrity is maintained or improved  Description  INTERVENTIONS:  - Identify patients at risk for skin breakdown  - Assess and monitor skin integrity  - Assess and monitor nutrition and hydration status  - Monitor labs   - Assess for incontinence   - Turn and reposition patient  - Assist with mobility/ambulation  - Relieve pressure over bony prominences  - Avoid friction and shearing  - Provide appropriate hygiene as needed including keeping skin clean and dry  - Evaluate need for skin moisturizer/barrier cream  - Collaborate with interdisciplinary team   - Patient/family teaching  - Consider wound care consult   Outcome: Progressing

## 2020-01-09 NOTE — RESTORATIVE TECHNICIAN NOTE
Restorative Specialist Mobility Note       Activity: Ambulate in mehta, Ambulate in room, Bathroom privileges, Chair, Dangle, Stand at bedside(Educated/encouraged pt to ambulate with assistance 3-4 x's/day  Bed alarm on   Pt callbell, phone/tray within reach )     Assistive Device: None             Aristides SANTILLAN, Restorative Technician, [employer]

## 2020-01-10 ENCOUNTER — HOSPITAL ENCOUNTER (OUTPATIENT)
Dept: INFUSION CENTER | Facility: CLINIC | Age: 65
End: 2020-01-10

## 2020-01-10 LAB
ANION GAP SERPL CALCULATED.3IONS-SCNC: 9 MMOL/L (ref 4–13)
BACTERIA UR CULT: ABNORMAL
BASOPHILS # BLD MANUAL: 0 THOUSAND/UL (ref 0–0.1)
BASOPHILS NFR MAR MANUAL: 0 % (ref 0–1)
BUN SERPL-MCNC: 17 MG/DL (ref 5–25)
CALCIUM SERPL-MCNC: 8.9 MG/DL (ref 8.3–10.1)
CHLORIDE SERPL-SCNC: 109 MMOL/L (ref 100–108)
CO2 SERPL-SCNC: 18 MMOL/L (ref 21–32)
CREAT SERPL-MCNC: 0.93 MG/DL (ref 0.6–1.3)
EOSINOPHIL # BLD MANUAL: 0.11 THOUSAND/UL (ref 0–0.4)
EOSINOPHIL NFR BLD MANUAL: 2 % (ref 0–6)
ERYTHROCYTE [DISTWIDTH] IN BLOOD BY AUTOMATED COUNT: 15.5 % (ref 11.6–15.1)
GFR SERPL CREATININE-BSD FRML MDRD: 66 ML/MIN/1.73SQ M
GLUCOSE SERPL-MCNC: 88 MG/DL (ref 65–140)
HCT VFR BLD AUTO: 25.8 % (ref 34.8–46.1)
HGB BLD-MCNC: 8.1 G/DL (ref 11.5–15.4)
LYMPHOCYTES # BLD AUTO: 1.1 THOUSAND/UL (ref 0.6–4.47)
LYMPHOCYTES # BLD AUTO: 20 % (ref 14–44)
MCH RBC QN AUTO: 29.5 PG (ref 26.8–34.3)
MCHC RBC AUTO-ENTMCNC: 31.4 G/DL (ref 31.4–37.4)
MCV RBC AUTO: 94 FL (ref 82–98)
METAMYELOCYTES NFR BLD MANUAL: 1 % (ref 0–1)
MONOCYTES # BLD AUTO: 0 THOUSAND/UL (ref 0–1.22)
MONOCYTES NFR BLD: 0 % (ref 4–12)
NEUTROPHILS # BLD MANUAL: 4.07 THOUSAND/UL (ref 1.85–7.62)
NEUTS BAND NFR BLD MANUAL: 9 % (ref 0–8)
NEUTS SEG NFR BLD AUTO: 65 % (ref 43–75)
NRBC BLD AUTO-RTO: 0 /100 WBCS
PLATELET # BLD AUTO: 113 THOUSANDS/UL (ref 149–390)
PLATELET BLD QL SMEAR: ABNORMAL
PMV BLD AUTO: 11.8 FL (ref 8.9–12.7)
POIKILOCYTOSIS BLD QL SMEAR: PRESENT
POTASSIUM SERPL-SCNC: 3.8 MMOL/L (ref 3.5–5.3)
RBC # BLD AUTO: 2.75 MILLION/UL (ref 3.81–5.12)
RBC MORPH BLD: PRESENT
SODIUM SERPL-SCNC: 136 MMOL/L (ref 136–145)
TOXIC GRANULES BLD QL SMEAR: PRESENT
VARIANT LYMPHS # BLD AUTO: 3 %
WBC # BLD AUTO: 5.5 THOUSAND/UL (ref 4.31–10.16)
WBC TOXIC VACUOLES BLD QL SMEAR: PRESENT

## 2020-01-10 PROCEDURE — 85027 COMPLETE CBC AUTOMATED: CPT | Performed by: OBSTETRICS & GYNECOLOGY

## 2020-01-10 PROCEDURE — NC001 PR NO CHARGE: Performed by: OBSTETRICS & GYNECOLOGY

## 2020-01-10 PROCEDURE — 99232 SBSQ HOSP IP/OBS MODERATE 35: CPT | Performed by: INTERNAL MEDICINE

## 2020-01-10 PROCEDURE — 99232 SBSQ HOSP IP/OBS MODERATE 35: CPT | Performed by: OBSTETRICS & GYNECOLOGY

## 2020-01-10 PROCEDURE — 85007 BL SMEAR W/DIFF WBC COUNT: CPT | Performed by: OBSTETRICS & GYNECOLOGY

## 2020-01-10 PROCEDURE — 80048 BASIC METABOLIC PNL TOTAL CA: CPT | Performed by: OBSTETRICS & GYNECOLOGY

## 2020-01-10 RX ORDER — LOPERAMIDE HYDROCHLORIDE 2 MG/1
2 CAPSULE ORAL 4 TIMES DAILY PRN
Status: DISCONTINUED | OUTPATIENT
Start: 2020-01-10 | End: 2020-01-12 | Stop reason: HOSPADM

## 2020-01-10 RX ADMIN — POTASSIUM CHLORIDE AND SODIUM CHLORIDE 125 ML/HR: 450; 150 INJECTION, SOLUTION INTRAVENOUS at 08:57

## 2020-01-10 RX ADMIN — LOPERAMIDE HYDROCHLORIDE 2 MG: 2 CAPSULE ORAL at 09:39

## 2020-01-10 RX ADMIN — LISINOPRIL 5 MG: 5 TABLET ORAL at 21:46

## 2020-01-10 RX ADMIN — CEFTAZIDIME, AVIBACTAM 2.5 G: 2; .5 POWDER, FOR SOLUTION INTRAVENOUS at 21:46

## 2020-01-10 RX ADMIN — CEFTAZIDIME, AVIBACTAM 2.5 G: 2; .5 POWDER, FOR SOLUTION INTRAVENOUS at 14:00

## 2020-01-10 RX ADMIN — CEFEPIME HYDROCHLORIDE 2000 MG: 2 INJECTION, POWDER, FOR SOLUTION INTRAVENOUS at 11:13

## 2020-01-10 RX ADMIN — POTASSIUM CHLORIDE AND SODIUM CHLORIDE 125 ML/HR: 450; 150 INJECTION, SOLUTION INTRAVENOUS at 00:56

## 2020-01-10 RX ADMIN — VENLAFAXINE 75 MG: 37.5 TABLET ORAL at 21:46

## 2020-01-10 RX ADMIN — POTASSIUM CHLORIDE AND SODIUM CHLORIDE 125 ML/HR: 450; 150 INJECTION, SOLUTION INTRAVENOUS at 20:33

## 2020-01-10 RX ADMIN — LOPERAMIDE HYDROCHLORIDE 2 MG: 2 CAPSULE ORAL at 14:04

## 2020-01-10 RX ADMIN — VENLAFAXINE 75 MG: 37.5 TABLET ORAL at 16:32

## 2020-01-10 RX ADMIN — ENOXAPARIN SODIUM 120 MG: 120 INJECTION SUBCUTANEOUS at 09:08

## 2020-01-10 RX ADMIN — PANTOPRAZOLE SODIUM 40 MG: 40 TABLET, DELAYED RELEASE ORAL at 09:09

## 2020-01-10 RX ADMIN — ARIPIPRAZOLE 10 MG: 10 TABLET ORAL at 09:09

## 2020-01-10 RX ADMIN — VENLAFAXINE 75 MG: 37.5 TABLET ORAL at 09:09

## 2020-01-10 NOTE — RESTORATIVE TECHNICIAN NOTE
Restorative Specialist Mobility Note       Activity: Ambulate in mehta, Ambulate in room, Bathroom privileges, Chair, Dangle, Stand at bedside(Educated/encouraged pt to ambulate with assistance 3-4 x's/day   Pt callbell, phone/tray within reach )     Assistive Device: None       Aristides SANTILLAN, Restorative Technician, [employer] [facility]

## 2020-01-10 NOTE — MALNUTRITION/BMI
This medical record reflects one or more clinical indicators suggestive of malnutrition   Malnutrition Findings:   Malnutrition type: (in the context of nutrition impact symptoms from cancer tx; resulting in periods of inadequate PO intake and wt loss)  Degree of Malnutrition: Malnutrition of moderate degree(evidenced by overall 21% wt loss since 6/2019 and mild muscle loss noted in temples; treated with oral diet as tolerated; declines nutrition supplement options that are available on hosptial formulary)  Malnutrition Characteristics: Weight loss, Muscle loss        See Nutrition note dated 1/10/2020 for additional details  Completed nutrition assessment is viewable in the nutrition documentation

## 2020-01-10 NOTE — DISCHARGE SUMMARY
Discharge Summary - Gynecologic Oncology  Krystle Leon 60 y o  female MRN: [id number]  [facility] Encounter: [id number]    Admission Date: 1/7/2020   Discharge Date: 1/12/2020    Admitting Attending Physician: Dr Jamse Mccartney  Discharge Attending Physician: Dr Nathanael Villavicencio Physician(s): Dr Satish Cotto, Infectious Disease    Admitting Diagnosis:   Fever [R50 9]    Discharge Diagnosis: Klebsiella Pneumonia Bacteremia & Urosepsis    Procedures Performed: noned    Hospital Course: Leda Carrion was admitted with fever of unknown origin and found to have klebsiella (Resistant KPC producing) bacteremia and urosepsis  ID was consulted as this was a readmission from 2 weeks prior where patient was admitted with urosepsis  She was treated with 2 days of IV Cefepime and then transitioned IV Avycaz on hospital day 2 prior to transition to PO Bactrim to complete course until 1/19/2020  Her white blood cell count trended down appropriately and she was afebrile for greater than 24 hours prior to discharge  On day of discharge, she was stable and will continue to follow with gyn oncology  Patient to follow up with Pet Scan in outpatient setting      Lab Results:   Lab Results   Component Value Date    WBC 5 50 01/10/2020    HGB 8 1 (L) 01/10/2020    HCT 25 8 (L) 01/10/2020    MCV 94 01/10/2020     (L) 01/10/2020     Lab Results   Component Value Date    GLUCOSE 219 (H) 12/19/2017    CALCIUM 8 9 01/10/2020     10/27/2017    K 3 8 01/10/2020    CO2 18 (L) 01/10/2020     (H) 01/10/2020    BUN 17 01/10/2020    CREATININE 0 93 01/10/2020     Lab Results   Component Value Date/Time    POCGLU 142 (H) 07/26/2019 09:33 AM    POCGLU 107 07/09/2019 11:48 AM    POCGLU 153 (H) 07/09/2019 08:16 AM    POCGLU 100 07/08/2019 08:57 PM    POCGLU 141 (H) 07/08/2019 04:46 PM     Lab Results   Component Value Date    PTT 40 (H) 01/07/2020     Lab Results   Component Value Date    INR 1 14 01/07/2020    INR 0 97 01/05/2020 INR 1 25 (H) 12/22/2019    PROTIME 14 2 01/07/2020    PROTIME 12 5 01/05/2020    PROTIME 15 3 (H) 12/22/2019     Condition at Discharge: fair     Discharge Medications: See after visit summary for reconciled discharge medications provided to patient and family  Discharge instructions/Information to patient and family: See after visit summary for information provided to patient and family  Provisions for Follow-Up Care: See after visit summary for information related to follow-up care and any pertinent home health orders  Disposition: Home    Planned Readmission: Yes      Discharge Statement   I spent 30 minutes discharging the patient  This time was spent on the day of discharge  I had direct contact with the patient on the day of discharge  Additional documentation is required if more than 30 minutes were spent on discharge       Marysusie English MD  01/12/20

## 2020-01-10 NOTE — PROGRESS NOTES
Patient seen at bedside for afternoon rounds  She appears well and is without complaint  She reports she is feeling better like to go home  We discussed that she has been afebrile on as this is excellent and she is improving will need to wait for final cultures to ensure she is on the appropriate antibiotics so that she does need to return back to the hospital later date  She reports understanding and all questions answered  /70 (BP Location: Right arm)   Pulse 85   Temp 98 4 °F (36 9 °C) (Oral)   Resp 16   Ht 4' 11" (1 499 m)   Wt 83 9 kg (185 lb)   LMP  (LMP Unknown)   SpO2 100%   Breastfeeding?  No   BMI 37 37 kg/m²   Recent Results (from the past 12 hour(s))   CBC and differential    Collection Time: 01/10/20  6:17 AM   Result Value Ref Range    WBC 5 50 4 31 - 10 16 Thousand/uL    RBC 2 75 (L) 3 81 - 5 12 Million/uL    Hemoglobin 8 1 (L) 11 5 - 15 4 g/dL    Hematocrit 25 8 (L) 34 8 - 46 1 %    MCV 94 82 - 98 fL    MCH 29 5 26 8 - 34 3 pg    MCHC 31 4 31 4 - 37 4 g/dL    RDW 15 5 (H) 11 6 - 15 1 %    MPV 11 8 8 9 - 12 7 fL    Platelets 521 (L) 403 - 390 Thousands/uL    nRBC 0 /100 WBCs   Basic metabolic panel    Collection Time: 01/10/20  6:17 AM   Result Value Ref Range    Sodium 136 136 - 145 mmol/L    Potassium 3 8 3 5 - 5 3 mmol/L    Chloride 109 (H) 100 - 108 mmol/L    CO2 18 (L) 21 - 32 mmol/L    ANION GAP 9 4 - 13 mmol/L    BUN 17 5 - 25 mg/dL    Creatinine 0 93 0 60 - 1 30 mg/dL    Glucose 88 65 - 140 mg/dL    Calcium 8 9 8 3 - 10 1 mg/dL    eGFR 66 ml/min/1 73sq m   Manual Differential(PHLEBS Do Not Order)    Collection Time: 01/10/20  6:17 AM   Result Value Ref Range    Segmented % 65 43 - 75 %    Bands % 9 (H) 0 - 8 %    Lymphocytes % 20 14 - 44 %    Monocytes % 0 (L) 4 - 12 %    Eosinophils, % 2 0 - 6 %    Basophils % 0 0 - 1 %    Metamyelocytes% 1 0 - 1 %    Atypical Lymphocytes % 3 (H) <=0 %    Absolute Neutrophils 4 07 1 85 - 7 62 Thousand/uL    Lymphocytes Absolute 1 10 0 60 - 4 47 Thousand/uL    Monocytes Absolute 0 00 0 00 - 1 22 Thousand/uL    Eosinophils Absolute 0 11 0 00 - 0 40 Thousand/uL    Basophils Absolute 0 00 0 00 - 0 10 Thousand/uL    Total Counted      Toxic Granulation Present     Toxic Vacuolated Neutrophils Present     RBC Morphology Present     Poikilocytes Present     Platelet Estimate Decreased (A) Adequate       Antibiotics were changed to Avycaz as urine culture resulted with resistant Klebsiella pneumoniae  Await final sensitivities on blood cultures      Toby Weller MD  01/10/20

## 2020-01-10 NOTE — PLAN OF CARE
Problem: Potential for Falls  Goal: Patient will remain free of falls  Description  INTERVENTIONS:  - Assess patient frequently for physical needs  -  Identify cognitive and physical deficits and behaviors that affect risk of falls  -  [place] fall precautions as indicated by assessment   - Educate patient/family on patient safety including physical limitations  - Instruct patient to call for assistance with activity based on assessment  - Modify environment to reduce risk of injury  - Consider OT/PT consult to assist with strengthening/mobility  Outcome: Progressing     Problem: Nutrition/Hydration-ADULT  Goal: Nutrient/Hydration intake appropriate for improving, restoring or maintaining nutritional needs  Description  Monitor and assess patient's nutrition/hydration status for malnutrition  Collaborate with interdisciplinary team and initiate plan and interventions as ordered  Monitor patient's weight and dietary intake as ordered or per policy  Utilize nutrition screening tool and intervene as necessary  Determine patient's food preferences and provide high-protein, high-caloric foods as appropriate       INTERVENTIONS:  - Monitor oral intake, urinary output, labs, and treatment plans  - Assess nutrition and hydration status and recommend course of action  - Evaluate amount of meals eaten  - Assist patient with eating if necessary   - Allow adequate time for meals  - Recommend/ encourage appropriate diets, oral nutritional supplements, and vitamin/mineral supplements  - Order, calculate, and assess calorie counts as needed  - Recommend, monitor, and adjust tube feedings and TPN/PPN based on assessed needs  - Assess need for intravenous fluids  - Provide specific nutrition/hydration education as appropriate  - Include patient/family/caregiver in decisions related to nutrition  Outcome: Progressing     Problem: Prexisting or High Potential for Compromised Skin Integrity  Goal: Skin integrity is maintained or improved  Description  INTERVENTIONS:  - Identify patients at risk for skin breakdown  - Assess and monitor skin integrity  - Assess and monitor nutrition and hydration status  - Monitor labs   - Assess for incontinence   - Turn and reposition patient  - Assist with mobility/ambulation  - Relieve pressure over bony prominences  - Avoid friction and shearing  - Provide appropriate hygiene as needed including keeping skin clean and dry  - Evaluate need for skin moisturizer/barrier cream  - Collaborate with interdisciplinary team   - Patient/family teaching  - Consider wound care consult   Outcome: Progressing     Problem: INFECTION - ADULT  Goal: Absence or prevention of progression during hospitalization  Description  INTERVENTIONS:  - Assess and monitor for signs and symptoms of infection  - Monitor lab/diagnostic results  - Monitor all insertion sites, i e  indwelling lines, tubes, and drains  - Monitor endotracheal if appropriate and nasal secretions for changes in amount and color  - [place] appropriate cooling/warming therapies per order  - Administer medications as ordered  - Instruct and encourage patient and family to use good hand hygiene technique  - Identify and instruct in appropriate isolation precautions for identified infection/condition  Outcome: Progressing

## 2020-01-10 NOTE — PROGRESS NOTES
Gyn Progress Note   Marlys Melendez 60 y o  female MRN: [id number]  Unit/Bed#: [facility] 914-01 Encounter: [id number]    Assessment/Plan:  62-year-old female with recurrent stage IB1 grade 1 endometrial cancer with sepsis due to Klebsiella  Hospital Day #4     1) Sepsis Klebsiella Bacteremia   - Last fever 101 1 at 0045 on 1/9/20, afebrile this AM   - Improved tachycardia    - Awaiting susceptibility from blood and urine cultures   - Appreciate ID input- will continue Cefepime 2 g Q 12 hours unless worsens or high fever persists with recommendations to switched to meropenem  ID continues to follow    -Patient reports some diarrhea last night, will follow for possible C diff  - AM Labs pending    2) Recurrent stage IB1 endometrial carcinoma    - s/p 6 cycles of Carboplatin and taxol (last dose 1/6/2020)   - no ongoing treatment at this time, await PET-CT outpatient setting for  consideration of pelvic radiotherapy     3  Anemia of chronic disease   - s/p transfusion of 1UPRBC on 1/6/2020   - Hgb on admission 8 9 --> 8 5 --> 8 8 ->f/u AM CBC   - Plan to transfuse if hemoglobin less than 7       4  History of DVT:    - Currently on lifetime anticoagulation with therapeutic lovenox   - Continue lovenox at this time       5  Right side PCNU, open- patient had recent admission for urosepsis for bag was placed back to drainage    - Cr 1 26 --> 1 15 --> 1 06->f/u AM labs continue to monitor, follow-up a m  Labs   - Excellent output overnight (approx  100 cc/hr)     6  FEN:    - Regular diet   - 0 45  ml/hr    7  DVT Ppx: Refuses SCDs, Lovenox, Ambulation with assistance    Disposition:  Continue inpatient management    Subjective:   Ms López Cavazos was seen at bedside this morning and reports feeling well  There were no febrile episodes overnight  She does report 3 bowel movements last night  She denies any abdominal or back pain, chest pain shortness of breath, dysuria, nausea, vomiting or calf pain    She is tolerating PO   Encourage patient OOB to chair and ambulation with assistance  /69   Pulse 86   Temp 98 6 °F (37 °C)   Resp 18   Ht 4' 11" (1 499 m)   Wt 83 9 kg (185 lb)   LMP  (LMP Unknown)   SpO2 99%   Breastfeeding? No   BMI 37 37 kg/m²     Lab Results   Component Value Date    WBC 8 06 01/09/2020    HGB 8 8 (L) 01/09/2020    HCT 28 0 (L) 01/09/2020    MCV 94 01/09/2020     01/09/2020       Lab Results   Component Value Date    GLUCOSE 219 (H) 12/19/2017    CALCIUM 8 6 01/09/2020     10/27/2017    K 3 3 (L) 01/09/2020    CO2 19 (L) 01/09/2020     (H) 01/09/2020    BUN 20 01/09/2020    CREATININE 1 06 01/09/2020       Lab Results   Component Value Date/Time    POCGLU 142 (H) 07/26/2019 09:33 AM    POCGLU 107 07/09/2019 11:48 AM    POCGLU 153 (H) 07/09/2019 08:16 AM    POCGLU 100 07/08/2019 08:57 PM    POCGLU 141 (H) 07/08/2019 04:46 PM       I/O last 3 completed shifts: In: 6508 3 [P O :1900; I V :4608 3]  Out: 2693 [Urine:3866]  I/O this shift:  In: 1043 8 [I V :1043 8]  Out: 650 [Urine:650]      Physical Exam  Physical Exam   Constitutional: She is oriented to person, place, and time  No distress  HENT:   Head: Normocephalic and atraumatic  Cardiovascular: Normal rate, regular rhythm and normal heart sounds  Exam reveals no gallop and no friction rub  No murmur heard  Pulmonary/Chest: Effort normal and breath sounds normal  No respiratory distress  She has no wheezes  She has no rales  Abdominal: Soft  There is no tenderness  There is no rebound and no guarding  Neurological: She is alert and oriented to person, place, and time  Skin: Skin is warm  She is not diaphoretic  Psychiatric: She has a normal mood and affect  Her behavior is normal    Vitals reviewed        Aneta ECHEVERRIA PGY-1  1/10/2020 6:30 AM

## 2020-01-11 LAB
ANION GAP SERPL CALCULATED.3IONS-SCNC: 5 MMOL/L (ref 4–13)
ANISOCYTOSIS BLD QL SMEAR: PRESENT
BASOPHILS # BLD MANUAL: 0.17 THOUSAND/UL (ref 0–0.1)
BASOPHILS NFR MAR MANUAL: 4 % (ref 0–1)
BUN SERPL-MCNC: 13 MG/DL (ref 5–25)
CALCIUM SERPL-MCNC: 9.4 MG/DL (ref 8.3–10.1)
CHLORIDE SERPL-SCNC: 110 MMOL/L (ref 100–108)
CO2 SERPL-SCNC: 21 MMOL/L (ref 21–32)
CREAT SERPL-MCNC: 0.9 MG/DL (ref 0.6–1.3)
EOSINOPHIL # BLD MANUAL: 0.04 THOUSAND/UL (ref 0–0.4)
EOSINOPHIL NFR BLD MANUAL: 1 % (ref 0–6)
ERYTHROCYTE [DISTWIDTH] IN BLOOD BY AUTOMATED COUNT: 15.3 % (ref 11.6–15.1)
GFR SERPL CREATININE-BSD FRML MDRD: 69 ML/MIN/1.73SQ M
GLUCOSE SERPL-MCNC: 87 MG/DL (ref 65–140)
HCT VFR BLD AUTO: 26.3 % (ref 34.8–46.1)
HGB BLD-MCNC: 8.3 G/DL (ref 11.5–15.4)
LYMPHOCYTES # BLD AUTO: 0.7 THOUSAND/UL (ref 0.6–4.47)
LYMPHOCYTES # BLD AUTO: 17 % (ref 14–44)
MACROCYTES BLD QL AUTO: PRESENT
MCH RBC QN AUTO: 29.9 PG (ref 26.8–34.3)
MCHC RBC AUTO-ENTMCNC: 31.6 G/DL (ref 31.4–37.4)
MCV RBC AUTO: 95 FL (ref 82–98)
MONOCYTES # BLD AUTO: 0.12 THOUSAND/UL (ref 0–1.22)
MONOCYTES NFR BLD: 3 % (ref 4–12)
NEUTROPHILS # BLD MANUAL: 2.97 THOUSAND/UL (ref 1.85–7.62)
NEUTS BAND NFR BLD MANUAL: 1 % (ref 0–8)
NEUTS SEG NFR BLD AUTO: 71 % (ref 43–75)
NRBC BLD AUTO-RTO: 0 /100 WBCS
PLATELET # BLD AUTO: 153 THOUSANDS/UL (ref 149–390)
PLATELET BLD QL SMEAR: ADEQUATE
PMV BLD AUTO: 11.8 FL (ref 8.9–12.7)
POTASSIUM SERPL-SCNC: 3.9 MMOL/L (ref 3.5–5.3)
RBC # BLD AUTO: 2.78 MILLION/UL (ref 3.81–5.12)
RBC MORPH BLD: PRESENT
SODIUM SERPL-SCNC: 136 MMOL/L (ref 136–145)
VARIANT LYMPHS # BLD AUTO: 3 %
WBC # BLD AUTO: 4.13 THOUSAND/UL (ref 4.31–10.16)

## 2020-01-11 PROCEDURE — 85027 COMPLETE CBC AUTOMATED: CPT | Performed by: OBSTETRICS & GYNECOLOGY

## 2020-01-11 PROCEDURE — 80048 BASIC METABOLIC PNL TOTAL CA: CPT | Performed by: OBSTETRICS & GYNECOLOGY

## 2020-01-11 PROCEDURE — 99233 SBSQ HOSP IP/OBS HIGH 50: CPT | Performed by: OBSTETRICS & GYNECOLOGY

## 2020-01-11 PROCEDURE — 85007 BL SMEAR W/DIFF WBC COUNT: CPT | Performed by: OBSTETRICS & GYNECOLOGY

## 2020-01-11 RX ORDER — SULFAMETHOXAZOLE AND TRIMETHOPRIM 800; 160 MG/1; MG/1
1 TABLET ORAL EVERY 12 HOURS SCHEDULED
Status: DISCONTINUED | OUTPATIENT
Start: 2020-01-11 | End: 2020-01-12 | Stop reason: HOSPADM

## 2020-01-11 RX ADMIN — ARIPIPRAZOLE 10 MG: 10 TABLET ORAL at 08:34

## 2020-01-11 RX ADMIN — ENOXAPARIN SODIUM 120 MG: 120 INJECTION SUBCUTANEOUS at 08:35

## 2020-01-11 RX ADMIN — SULFAMETHOXAZOLE AND TRIMETHOPRIM 1 TABLET: 800; 160 TABLET ORAL at 21:10

## 2020-01-11 RX ADMIN — POTASSIUM CHLORIDE AND SODIUM CHLORIDE 125 ML/HR: 450; 150 INJECTION, SOLUTION INTRAVENOUS at 17:43

## 2020-01-11 RX ADMIN — POTASSIUM CHLORIDE AND SODIUM CHLORIDE 125 ML/HR: 450; 150 INJECTION, SOLUTION INTRAVENOUS at 09:51

## 2020-01-11 RX ADMIN — CEFTAZIDIME, AVIBACTAM 2.5 G: 2; .5 POWDER, FOR SOLUTION INTRAVENOUS at 06:35

## 2020-01-11 RX ADMIN — VENLAFAXINE 75 MG: 37.5 TABLET ORAL at 08:34

## 2020-01-11 RX ADMIN — LISINOPRIL 5 MG: 5 TABLET ORAL at 21:10

## 2020-01-11 RX ADMIN — SULFAMETHOXAZOLE AND TRIMETHOPRIM 1 TABLET: 800; 160 TABLET ORAL at 14:32

## 2020-01-11 RX ADMIN — VENLAFAXINE 75 MG: 37.5 TABLET ORAL at 17:07

## 2020-01-11 RX ADMIN — VENLAFAXINE 75 MG: 37.5 TABLET ORAL at 21:09

## 2020-01-11 RX ADMIN — PANTOPRAZOLE SODIUM 40 MG: 40 TABLET, DELAYED RELEASE ORAL at 08:34

## 2020-01-11 NOTE — QUICK NOTE
Blood cultures sensitivities reviewed with Dr Maxime Mota from ID- patient to continue on Bactrim DS BID for a total of 10 days on active antibiotics to 1/17/2020  Anticipate discharge tomorrow AM  Discussed findings and plan with patient       Paras Reeves MD   GYN Resident PGY3  01/11/20

## 2020-01-11 NOTE — PLAN OF CARE
Problem: Potential for Falls  Goal: Patient will remain free of falls  Description  INTERVENTIONS:  - Assess patient frequently for physical needs  -  Identify cognitive and physical deficits and behaviors that affect risk of falls  -  [place] fall precautions as indicated by assessment   - Educate patient/family on patient safety including physical limitations  - Instruct patient to call for assistance with activity based on assessment  - Modify environment to reduce risk of injury  - Consider OT/PT consult to assist with strengthening/mobility  Outcome: Progressing     Problem: Nutrition/Hydration-ADULT  Goal: Nutrient/Hydration intake appropriate for improving, restoring or maintaining nutritional needs  Description  Monitor and assess patient's nutrition/hydration status for malnutrition  Collaborate with interdisciplinary team and initiate plan and interventions as ordered  Monitor patient's weight and dietary intake as ordered or per policy  Utilize nutrition screening tool and intervene as necessary  Determine patient's food preferences and provide high-protein, high-caloric foods as appropriate       INTERVENTIONS:  - Monitor oral intake, urinary output, labs, and treatment plans  - Assess nutrition and hydration status and recommend course of action  - Evaluate amount of meals eaten  - Assist patient with eating if necessary   - Allow adequate time for meals  - Recommend/ encourage appropriate diets, oral nutritional supplements, and vitamin/mineral supplements  - Order, calculate, and assess calorie counts as needed  - Recommend, monitor, and adjust tube feedings and TPN/PPN based on assessed needs  - Assess need for intravenous fluids  - Provide specific nutrition/hydration education as appropriate  - Include patient/family/caregiver in decisions related to nutrition  Outcome: Progressing     Problem: Prexisting or High Potential for Compromised Skin Integrity  Goal: Skin integrity is maintained or improved  Description  INTERVENTIONS:  - Identify patients at risk for skin breakdown  - Assess and monitor skin integrity  - Assess and monitor nutrition and hydration status  - Monitor labs   - Assess for incontinence   - Turn and reposition patient  - Assist with mobility/ambulation  - Relieve pressure over bony prominences  - Avoid friction and shearing  - Provide appropriate hygiene as needed including keeping skin clean and dry  - Evaluate need for skin moisturizer/barrier cream  - Collaborate with interdisciplinary team   - Patient/family teaching  - Consider wound care consult   Outcome: Progressing     Problem: INFECTION - ADULT  Goal: Absence or prevention of progression during hospitalization  Description  INTERVENTIONS:  - Assess and monitor for signs and symptoms of infection  - Monitor lab/diagnostic results  - Monitor all insertion sites, i e  indwelling lines, tubes, and drains  - Monitor endotracheal if appropriate and nasal secretions for changes in amount and color  - [place] appropriate cooling/warming therapies per order  - Administer medications as ordered  - Instruct and encourage patient and family to use good hand hygiene technique  - Identify and instruct in appropriate isolation precautions for identified infection/condition  Outcome: Progressing

## 2020-01-11 NOTE — PROGRESS NOTES
Gyn Progress Note   Debra Melendez 60 y o  female MRN: [id number]  [facility]: [id number]    Assessment/Plan:  60-year-old female with recurrent stage IB1 grade 1 endometrial cancer with sepsis due to Klebsiella- improved  Hospital Day #5  1) Sepsis due to Klebsiella Bacteremia most likely from urinary source   - Last fever 101 1 at 0045 on 1/9/20, remains afebrile since that time   - Awaiting susceptibility from blood cultures- appears to be resistant strain of Klebsiella   - Appreciate ID input- patient is s/p cefepime which was transitioned to [address] [place] [address] yesterday secondary to antibiotic sensitivities noted on the urine Klebsiella which appears to be sensitive to ciprofloxacin  If blood cultures are sensitive to ciprofloxacin patient will go home on ciprofloxacin course to finish out 10 days of active antibiotics  - Diarrhea resolved since yesterday   - AM Labs pending    2) Recurrent stage IB1 endometrial carcinoma    - s/p 6 cycles of Carboplatin and taxol (last dose 1/6/2020)   - no ongoing treatment at this time, await PET-CT outpatient setting for  consideration of pelvic radiotherapy     3  Anemia of chronic disease and thrombocytopenia   - s/p transfusion of 1UPRBC on 1/6/2020   - Hgb on admission strable in 8s since admission, await AM labs   - Plan to transfuse if hemoglobin less than 7     - PLT yesterday 113, await AM labs     4  History of DVT:    - Currently on lifetime anticoagulation with therapeutic lovenox   - Continue lovenox at this time       5  Right side PCNU, open- patient had recent admission for urosepsis for bag was placed back to drainage    - Cr 1 26 --> 1 15 --> 1 06->f/u AM labs continue to monitor, follow-up a m  Labs   - continues to make good urine output     6  FEN:    - Regular diet   - 0 45  ml/hr    7   DVT Ppx: Refuses SCDs, Lovenox, Ambulation with assistance    Disposition:  Await final sensitivities blood cultures to place patient on appropriate antibiotic course for discharge    Subjective:   Ms Bridgette Daley was seen at bedside this morning and reports she is doing well and desires discharge home- we discussed that we are waiting final blood cultures for a antibiotic course to go home on  She denies any pain, fevers, chills, chest pain, shortness of breath, nausea, vomiting, diarrhea or calf pain  Patient reports she has not had any episodes of diarrhea since yesterday  She has been tolerating regular diet and ambulating without difficulty  /67   Pulse 82   Temp 97 9 °F (36 6 °C)   Resp 20   Ht 4' 11" (1 499 m)   Wt 83 9 kg (185 lb)   LMP  (LMP Unknown)   SpO2 99%   Breastfeeding? No   BMI 37 37 kg/m²     Lab Results   Component Value Date    WBC 5 50 01/10/2020    HGB 8 1 (L) 01/10/2020    HCT 25 8 (L) 01/10/2020    MCV 94 01/10/2020     (L) 01/10/2020     I/O last 3 completed shifts: In: 4443 8 [P O :1120; I V :3145 8; IV Piggyback:178]  Out: 7111 [Urine:3075; Stool:2]  No intake/output data recorded  Physical Exam  Physical Exam   Constitutional: She is oriented to person, place, and time  She appears well-developed and well-nourished  No distress  HENT:   Head: Normocephalic and atraumatic  Neck: Normal range of motion  Neck supple  Cardiovascular: Normal rate, regular rhythm and normal heart sounds  Exam reveals no gallop and no friction rub  No murmur heard  Pulmonary/Chest: Effort normal and breath sounds normal  No respiratory distress  She has no wheezes  She has no rales  Abdominal: Soft  She exhibits no distension  There is no tenderness  There is no rebound and no guarding  Right-sided PCN with 100 cc clear yellow urine and bag   Neurological: She is alert and oriented to person, place, and time  Skin: Skin is warm and dry  She is not diaphoretic  Psychiatric: She has a normal mood and affect  Her behavior is normal    Vitals reviewed        Diane ECHEVERRIA PGY-3  1/11/2020 7:36 AM

## 2020-01-12 VITALS
HEIGHT: 59 IN | WEIGHT: 185 LBS | TEMPERATURE: 98.4 F | BODY MASS INDEX: 37.29 KG/M2 | SYSTOLIC BLOOD PRESSURE: 116 MMHG | OXYGEN SATURATION: 100 % | RESPIRATION RATE: 17 BRPM | HEART RATE: 68 BPM | DIASTOLIC BLOOD PRESSURE: 55 MMHG

## 2020-01-12 PROBLEM — A41.9 SEPSIS (HCC): Status: RESOLVED | Noted: 2019-09-19 | Resolved: 2020-01-12

## 2020-01-12 LAB
ANION GAP SERPL CALCULATED.3IONS-SCNC: 7 MMOL/L (ref 4–13)
ANISOCYTOSIS BLD QL SMEAR: PRESENT
BASOPHILS # BLD MANUAL: 0 THOUSAND/UL (ref 0–0.1)
BASOPHILS NFR MAR MANUAL: 0 % (ref 0–1)
BUN SERPL-MCNC: 10 MG/DL (ref 5–25)
CALCIUM SERPL-MCNC: 9.4 MG/DL (ref 8.3–10.1)
CHLORIDE SERPL-SCNC: 113 MMOL/L (ref 100–108)
CO2 SERPL-SCNC: 19 MMOL/L (ref 21–32)
CREAT SERPL-MCNC: 0.9 MG/DL (ref 0.6–1.3)
EOSINOPHIL # BLD MANUAL: 0 THOUSAND/UL (ref 0–0.4)
EOSINOPHIL NFR BLD MANUAL: 0 % (ref 0–6)
ERYTHROCYTE [DISTWIDTH] IN BLOOD BY AUTOMATED COUNT: 15.1 % (ref 11.6–15.1)
GFR SERPL CREATININE-BSD FRML MDRD: 69 ML/MIN/1.73SQ M
GLUCOSE SERPL-MCNC: 81 MG/DL (ref 65–140)
HCT VFR BLD AUTO: 25.8 % (ref 34.8–46.1)
HGB BLD-MCNC: 8.2 G/DL (ref 11.5–15.4)
LYMPHOCYTES # BLD AUTO: 0.76 THOUSAND/UL (ref 0.6–4.47)
LYMPHOCYTES # BLD AUTO: 26 % (ref 14–44)
MCH RBC QN AUTO: 29.8 PG (ref 26.8–34.3)
MCHC RBC AUTO-ENTMCNC: 31.8 G/DL (ref 31.4–37.4)
MCV RBC AUTO: 94 FL (ref 82–98)
MONOCYTES # BLD AUTO: 0.76 THOUSAND/UL (ref 0–1.22)
MONOCYTES NFR BLD: 26 % (ref 4–12)
NEUTROPHILS # BLD MANUAL: 1.4 THOUSAND/UL (ref 1.85–7.62)
NEUTS SEG NFR BLD AUTO: 48 % (ref 43–75)
NRBC BLD AUTO-RTO: 0 /100 WBCS
PLATELET # BLD AUTO: 150 THOUSANDS/UL (ref 149–390)
PLATELET BLD QL SMEAR: ADEQUATE
PMV BLD AUTO: 10.9 FL (ref 8.9–12.7)
POTASSIUM SERPL-SCNC: 3.8 MMOL/L (ref 3.5–5.3)
RBC # BLD AUTO: 2.75 MILLION/UL (ref 3.81–5.12)
RBC MORPH BLD: PRESENT
SODIUM SERPL-SCNC: 139 MMOL/L (ref 136–145)
WBC # BLD AUTO: 2.91 THOUSAND/UL (ref 4.31–10.16)

## 2020-01-12 PROCEDURE — 99238 HOSP IP/OBS DSCHRG MGMT 30/<: CPT | Performed by: OBSTETRICS & GYNECOLOGY

## 2020-01-12 PROCEDURE — 80048 BASIC METABOLIC PNL TOTAL CA: CPT | Performed by: STUDENT IN AN ORGANIZED HEALTH CARE EDUCATION/TRAINING PROGRAM

## 2020-01-12 PROCEDURE — 85027 COMPLETE CBC AUTOMATED: CPT | Performed by: STUDENT IN AN ORGANIZED HEALTH CARE EDUCATION/TRAINING PROGRAM

## 2020-01-12 PROCEDURE — NC001 PR NO CHARGE: Performed by: OBSTETRICS & GYNECOLOGY

## 2020-01-12 PROCEDURE — 85007 BL SMEAR W/DIFF WBC COUNT: CPT | Performed by: STUDENT IN AN ORGANIZED HEALTH CARE EDUCATION/TRAINING PROGRAM

## 2020-01-12 RX ORDER — HYDROXYZINE HYDROCHLORIDE 25 MG/1
25 TABLET, FILM COATED ORAL EVERY 6 HOURS PRN
Qty: 30 TABLET | Refills: 0 | Status: ON HOLD | OUTPATIENT
Start: 2020-01-12 | End: 2020-08-06 | Stop reason: CLARIF

## 2020-01-12 RX ORDER — SULFAMETHOXAZOLE AND TRIMETHOPRIM 800; 160 MG/1; MG/1
1 TABLET ORAL EVERY 12 HOURS SCHEDULED
Qty: 14 TABLET | Refills: 0 | Status: SHIPPED | OUTPATIENT
Start: 2020-01-12 | End: 2020-01-19

## 2020-01-12 RX ADMIN — SULFAMETHOXAZOLE AND TRIMETHOPRIM 1 TABLET: 800; 160 TABLET ORAL at 08:31

## 2020-01-12 RX ADMIN — POTASSIUM CHLORIDE AND SODIUM CHLORIDE 125 ML/HR: 450; 150 INJECTION, SOLUTION INTRAVENOUS at 01:11

## 2020-01-12 RX ADMIN — ENOXAPARIN SODIUM 120 MG: 120 INJECTION SUBCUTANEOUS at 08:31

## 2020-01-12 RX ADMIN — VENLAFAXINE 75 MG: 37.5 TABLET ORAL at 08:31

## 2020-01-12 RX ADMIN — ARIPIPRAZOLE 10 MG: 10 TABLET ORAL at 08:31

## 2020-01-12 RX ADMIN — PANTOPRAZOLE SODIUM 40 MG: 40 TABLET, DELAYED RELEASE ORAL at 08:31

## 2020-01-12 NOTE — DISCHARGE INSTRUCTIONS
Sepsis   WHAT YOU SHOULD KNOW:   Sepsis is a serious condition that occurs when the body overreacts to an infection  It is also called systemic inflammatory response syndrome (SIRS) with infection  An infection is usually caused by bacteria that attack the body  The body's defense system normally fights off infection within the affected body part  With sepsis, the body overreacts and causes symptoms to occur throughout the body  This leads to uncontrolled and widespread inflammation and clotting in small blood vessels  Blood flow to different body parts decreases and may lead to organ failure  Sepsis requires immediate treatment  AFTER YOU LEAVE:   Follow up with your healthcare provider as directed:  Write down your questions so you remember to ask them during your visits  Prevent infection: The following are ways that you can help prevent infection, which can lead to sepsis:  · Ask about vaccines:  Vaccines can decrease your risk of getting certain infections, such as the flu or pneumonia  Ask your healthcare provider if you should get a flu or pneumonia vaccine, and when to get the vaccine  · Avoid the spread of germs:        [facility] AUTHOR[employer] your hands often with soap and water  Carry germ-killing gel with you  You can use the gel to clean your hands when there is no soap and water available  ¨ Do not touch your eyes, nose, or mouth unless you have washed your hands first     ¨ Always cover your mouth when you cough  Cough into a tissue or your shirtsleeve so you do not spread germs from your hands  ¨ Try to avoid people who have a cold or the flu  If you are sick, stay away from others as much as possible  Contact your healthcare provider if:   · You have a fever  · You have questions or concerns about your condition or care  Seek care immediately or call 911 if:   · You have increased swelling in your legs, feet, or abdomen  · You are short of breath or you cough up blood      · You have a fast heart rate and your chest hurts  · You feel so dizzy that you have trouble standing up  · Your lips or fingernails are blue  © 2014 [address] [employer] is for End User's use only and may not be sold, redistributed or otherwise used for commercial purposes  All illustrations and images included in CareNotes® are the copyrighted property of A D [employer] M [employer]  or [employer]  The above information is an  only  It is not intended as medical advice for individual conditions or treatments  Talk to your doctor, nurse or pharmacist before following any medical regimen to see if it is safe and effective for you

## 2020-01-12 NOTE — DISCHARGE SUMMARY
Discharge Summary - Gynecology  Neno Scales 60 y o  female MRN: [id number]  [facility] Encounter: [id number]    Admission Date: 1/7/2020   Discharge Date: 01/12/20    Attending Physician:   Dr Chaka Maurer Physician(s):   Infectious disease    Admitting Diagnosis:   Urosepsis  Recurrent endometrial cancer    Discharge Diagnosis:   Same as above    Procedures Performed:   None    HPI:  Brandon Manuel is a 67yo with recurrent endometrial cancer, admitted on 1/7/2020 with fever and suspicion of urosepsis  She had a history of recent urosepsis  Infectious disease was immediately consulted to assist with appropriate antibiotic coverage  Pt was started on cefepime, then transitioned to [employer]  Once urine culture revealed klebsiella pneumonia bacteremia with sensitivities to [facility], she was transitioned to PO Bactrim DS BID for outpatient management  Additional concerns included the patient's anemia  She had undergone a recent blood transfusion on 1/6/2020 prior to admission  Upon arrival, she was noted to have a hemoglobin stable in the low 8's  The patient has a R PCN tube that is open  Her creatinine upon admission was noted to be 1 2; this has steadily down-trended over the course of her admission  She has been repleted for hypokalemia during her admission  On day of discharge, pt reports doing well and feeling well  She reported a desire to go home  She was instructed on the importance of follow up in the outpatient setting and provided with antibiotics per infectious disease recommendations  She had the opportunity to ask questions, all of which were answered to her satisfaction      Lab Results:   Lab Results   Component Value Date    WBC 2 91 (L) 01/12/2020    HGB 8 2 (L) 01/12/2020    HCT 25 8 (L) 01/12/2020    MCV 94 01/12/2020     01/12/2020     Lab Results   Component Value Date    GLUCOSE 219 (H) 12/19/2017    CALCIUM 9 4 01/12/2020     10/27/2017 K 3 8 01/12/2020    CO2 19 (L) 01/12/2020     (H) 01/12/2020    BUN 10 01/12/2020    CREATININE 0 90 01/12/2020     Lab Results   Component Value Date/Time    POCGLU 142 (H) 07/26/2019 09:33 AM    POCGLU 107 07/09/2019 11:48 AM    POCGLU 153 (H) 07/09/2019 08:16 AM    POCGLU 100 07/08/2019 08:57 PM    POCGLU 141 (H) 07/08/2019 04:46 PM     Lab Results   Component Value Date    PTT 40 (H) 01/07/2020     Lab Results   Component Value Date    INR 1 14 01/07/2020    INR 0 97 01/05/2020    INR 1 25 (H) 12/22/2019    PROTIME 14 2 01/07/2020    PROTIME 12 5 01/05/2020    PROTIME 15 3 (H) 95/17/5207       Complications:   None    Condition at Discharge:   stable     Discharge Medications:   See after visit summary for reconciled discharge medications provided to patient and family  Discharge instructions: See after visit summary for complete information  Do not place anything (no partner, tampons or douche) in your vagina for 6 weeks  You may walk for exercise for the first 6 weeks then gradually return to your usual activities     Please do not drive until tolerating pain and off of narcotics     You may take baths or shower per your preference     Please look at your incision in the mirror daily and call for redness or tenderness or increased warmth   Call us for increasing redness or steady drainage from the incision     Please call us for temperature > 100 4*F or 38* C, worsening pain or a foul discharge  Provisions for Follow-Up Care:   See after visit summary for information related to follow-up care and any pertinent home health orders        Disposition: Home  Planned Readmission: Yes, pt has a chronic illness with recent urosepsis with an open PCN; high likelihood of future readmission         Jordan Bernal DO  OB/GYN, PGY4  1/12/2020, 11:49 AM

## 2020-01-12 NOTE — PLAN OF CARE
Problem: Potential for Falls  Goal: Patient will remain free of falls  Description  INTERVENTIONS:  - Assess patient frequently for physical needs  -  Identify cognitive and physical deficits and behaviors that affect risk of falls  -  [place] fall precautions as indicated by assessment   - Educate patient/family on patient safety including physical limitations  - Instruct patient to call for assistance with activity based on assessment  - Modify environment to reduce risk of injury  - Consider OT/PT consult to assist with strengthening/mobility  Outcome: Progressing     Problem: Nutrition/Hydration-ADULT  Goal: Nutrient/Hydration intake appropriate for improving, restoring or maintaining nutritional needs  Description  Monitor and assess patient's nutrition/hydration status for malnutrition  Collaborate with interdisciplinary team and initiate plan and interventions as ordered  Monitor patient's weight and dietary intake as ordered or per policy  Utilize nutrition screening tool and intervene as necessary  Determine patient's food preferences and provide high-protein, high-caloric foods as appropriate       INTERVENTIONS:  - Monitor oral intake, urinary output, labs, and treatment plans  - Assess nutrition and hydration status and recommend course of action  - Evaluate amount of meals eaten  - Assist patient with eating if necessary   - Allow adequate time for meals  - Recommend/ encourage appropriate diets, oral nutritional supplements, and vitamin/mineral supplements  - Order, calculate, and assess calorie counts as needed  - Recommend, monitor, and adjust tube feedings and TPN/PPN based on assessed needs  - Assess need for intravenous fluids  - Provide specific nutrition/hydration education as appropriate  - Include patient/family/caregiver in decisions related to nutrition  Outcome: Progressing     Problem: Prexisting or High Potential for Compromised Skin Integrity  Goal: Skin integrity is maintained or improved  Description  INTERVENTIONS:  - Identify patients at risk for skin breakdown  - Assess and monitor skin integrity  - Assess and monitor nutrition and hydration status  - Monitor labs   - Assess for incontinence   - Turn and reposition patient  - Assist with mobility/ambulation  - Relieve pressure over bony prominences  - Avoid friction and shearing  - Provide appropriate hygiene as needed including keeping skin clean and dry  - Evaluate need for skin moisturizer/barrier cream  - Collaborate with interdisciplinary team   - Patient/family teaching  - Consider wound care consult   Outcome: Progressing     Problem: INFECTION - ADULT  Goal: Absence or prevention of progression during hospitalization  Description  INTERVENTIONS:  - Assess and monitor for signs and symptoms of infection  - Monitor lab/diagnostic results  - Monitor all insertion sites, i e  indwelling lines, tubes, and drains  - Monitor endotracheal if appropriate and nasal secretions for changes in amount and color  - [place] appropriate cooling/warming therapies per order  - Administer medications as ordered  - Instruct and encourage patient and family to use good hand hygiene technique  - Identify and instruct in appropriate isolation precautions for identified infection/condition  Outcome: Progressing

## 2020-01-12 NOTE — PROGRESS NOTES
Gyn Progress Note   Jason Melendez 60 y o  female MRN: [id number]  Unit/Bed#: [facility] 914-01 Encounter: [id number]    Assessment/Plan:  82-year-old female with recurrent stage IB1 grade 1 endometrial cancer with sepsis due to Klebsiella- improved  Hospital Day #5  1) Sepsis due to Klebsiella Bacteremia most likely from urinary source   - Last fever 101 1 at 0045 on 1/9/20, remains afebrile since that time   - S/p Cefepime and Avycaz --> patient transitioned to PO regimen Bactrim DS BID as organism is susceptible sensitivities from blood culture   - Follow AM labs    2) Recurrent stage IB1 endometrial carcinoma    - s/p 6 cycles of Carboplatin and taxol (last dose 1/6/2020)   - no ongoing treatment at this time, await PET-CT outpatient setting for  consideration of pelvic radiotherapy     3  Anemia secondary to chemotherapy   - s/p transfusion of 1UPRBC on 1/6/2020   - Hgb stable in 8s since admission, await AM labs   - Plan to transfuse if hemoglobin less than 7     - PLT yesterday 113, await AM labs     4  History of DVT:    - Currently on lifetime anticoagulation with therapeutic lovenox   - Continue lovenox at this time       5  Right side PCNU, open- patient had recent admission for urosepsis for bag was placed back to drainage    - Cr 1 26 --> 1 15 --> 1 06-> 0 9 --> f/u AM labs      6  FEN:    - Regular diet    7  DVT Ppx: Refuses SCDs, Lovenox, Ambulation with assistance    Disposition:  Discharge today    Subjective:   Ms Ary Ibarra was seen she was resting comfortably at bedside  She reports she is feeling well and much percent patient is looking forward to discharge  Her only complaint is that she has increased urinary frequency which is proves discontinuation of IV fluids  She fevers, chills, abdominal pain, chest pain, shortness of breath, nausea, vomiting or calf pain  She is ambulating on her own  She is tolerating a regular diet      /55   Pulse 79   Temp 98 7 °F (37 1 °C)   Resp 20   Ht 4' 11" (1 499 m)   Wt 83 9 kg (185 lb)   LMP  (LMP Unknown)   SpO2 98%   Breastfeeding? No   BMI 37 37 kg/m²     Lab Results   Component Value Date    WBC 4 13 (L) 01/11/2020    HGB 8 3 (L) 01/11/2020    HCT 26 3 (L) 01/11/2020    MCV 95 01/11/2020     01/11/2020     I/O last 3 completed shifts: In: 7219 7 [P O :2200; I V :4741 7; IV Piggyback:278]  Out: 3985 [Urine:5425; Stool:2]  I/O this shift:  In: 3000 [P O :240; I V :1250]  Out: 1225 [Urine:1225]      Physical Exam  Physical Exam   Constitutional: She is oriented to person, place, and time  No distress  HENT:   Head: Normocephalic and atraumatic  Neck: Normal range of motion  Neck supple  Cardiovascular: Normal rate, regular rhythm and normal heart sounds  Exam reveals no gallop and no friction rub  No murmur heard  Pulmonary/Chest: Effort normal and breath sounds normal  No respiratory distress  She has no wheezes  She has no rales  Abdominal: Soft  There is no tenderness  There is no rebound and no guarding  Right-sided PCN with 50 cc clear yellow urine   Genitourinary: Vagina normal    Neurological: She is alert and oriented to person, place, and time  Skin: Skin is warm and dry  Rash noted  She is not diaphoretic  Hypo pigmented rash noted at back with some scabbing patient reports secondary to itching   Psychiatric: She has a normal mood and affect  Her behavior is normal    Vitals reviewed        Lorna ECHEVERRIA PGY-3  1/12/2020 5:18 AM

## 2020-01-13 LAB
BACTERIA BLD CULT: ABNORMAL
GRAM STN SPEC: ABNORMAL

## 2020-01-13 NOTE — UTILIZATION REVIEW
Notification of Discharge  This is a Notification of Discharge from our facility [address]  Please be advised that this patient has been discharge from our facility  Below you will find the admission and discharge date and time including the patients disposition  PRESENTATION DATE: 1/7/2020  8:05 PM  OBS ADMISSION DATE:   IP ADMISSION DATE: 1/8/20 1413   DISCHARGE DATE: 1/12/2020 11:31 AM  DISPOSITION: Home/Self Care Home/Self Care   Admission Orders listed below:  Admission Orders (From admission, onward)     Ordered        01/08/20 1413  Inpatient Admission  Once         01/07/20 2147  Place in Observation  Once                   Please contact the UR Department if additional information is required to close this patient's authorization/case  [address] Utilization Review Department  Main: [phone] x carefully listen to the prompts  All voicemails are confidential   [email or web address]  Send all requests for admission clinical reviews, approved or denied determinations and any other requests to dedicated fax number below belonging to the campus where the patient is receiving treatment   List of dedicated fax numbers:  [address] DENIALS (Administrative/Medical Necessity) [phone]   [address] (Maternity/NICU/Pediatrics) [phone]   Stevie Rockland [phone]   Samuel Spangler [phone]   Del Schrader [phone]   [address] [place] [address] [phone]   [facility]  [phone]   [address] [facility]  [address] [facility] [address] [facility] [phone]

## 2020-01-14 ENCOUNTER — TELEPHONE (OUTPATIENT)
Dept: GYNECOLOGIC ONCOLOGY | Facility: CLINIC | Age: 65
End: 2020-01-14

## 2020-01-14 NOTE — PROGRESS NOTES
Outpatient Oncology Nutrition Consult  Type of Consult: Follow Up  Care Location: Telephone Call   Nutrition Assessment:  PMH: anemia, depression, DM, HLD, HTN, obesity, RNYGB (2001 at [address])  11/2719: Pt reports that on Monday her nephrostomy bag was capped and she can now urinate normally  Oncology Diagnosis & Treatments: Endometrial cancer  S/p surgery 12/19/17  Recurrence 7/8/19  S/p chemotherapy (carboplatin/taxol from 7/30/19-1/6/20)  Will be meeting with [address] [place] [address] on 2/4/20  Endometrial cancer ([facility] )    11/17/2017 Initial Diagnosis     Endometrial cancer ([facility] )      12/19/2017 Surgery     Robotic assisted total laparoscopic hysterectomy with bilateral salpingo-oophorectomy and sentinel bilateral pelvic lymph node dissection  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 (4 4 x 3 2 cm tumor, 9 4/15 4mm invasion, NO LVSI, washings revealed atypical cellular changes)      12/19/2017 Genetic Testing     Morrison testing negative      7/8/2019 Recurrence     Presented with right lower extremity DVT and CT demonstrating right pelvic sidewall mass with venous, ureteral and nerve compression causing significant neuropathic pain  Core biopsy demonstrates high-grade carcinoma  Chemotherapy     Taxol 175 mg/m2 and carboplatin AUC 6 every 21 days  Dose was reduced to taxol 135 mg/m2 and carboplatin AUC 5 with cycle 4  She is scheduled for cycle 6 of treatment          Past Medical History:   Diagnosis Date    Anemia     Chemotherapy follow-up examination     Depression     Diabetes mellitus ([facility] )     Endometrial cancer ([facility] ) 12/2017    Hyperglycemia     vx type 2 dm -- last assessed 4/1/14; resolved 11/7/17    Hyperlipidemia     Hypertension     Obesity     last assessed 10/14/17; resolved 11/7/17     Past Surgical History:   Procedure Laterality Date    ABDOMINAL SURGERY      GASTRIC BYPASS    CHOLECYSTECTOMY      at the time of gastric bypass    COLONOSCOPY      CT NEEDLE BIOPSY LYMPH NODE  7/8/2019    FL GUIDED CENTRAL VENOUS ACCESS DEVICE INSERTION  11/12/2019    GASTRIC BYPASS      HYSTERECTOMY Bilateral     total abdominal with salpingo-oophorectomy    IR PICC LINE  9/27/2019    IR PORT PLACEMENT  7/26/2019    IR PORT REMOVAL  9/20/2019    IR TUBE PLACEMENT NEPHROSTOMY  7/26/2019    OOPHORECTOMY Bilateral     GA INSJ TUNNELED CTR VAD W/SUBQ PORT AGE 5 YR/> Left 11/12/2019    Procedure: INSERTION VENOUS PORT ( PORT-A-CATH) IR;  Surgeon: Arlin Romano DO;  [facility];  Service: Interventional Radiology    GA LAP, RADICAL HYST W/ TUBE&OV, NODE BX N/A 12/19/2017    Procedure: HYSTERECTOMY LAPAROSCOPIC TOTAL ([address]) W/ ROBOTICS; BILATERAL SALPINGOOPHERECTOMY; LYMPH NODE DISSECTION; lysis of adhesions;  Surgeon: Asim Santiago MD;  [facility];  Service: Gynecology Oncology    GA LAP,DIAGNOSTIC ABDOMEN N/A 12/19/2017    Procedure: LAPAROSCOPY DIAGNOSTIC;  Surgeon: Asim Santiago MD;  [facility];  Service: Gynecology Oncology    TONSILLECTOMY      US GUIDED BREAST BIOPSY RIGHT COMPLETE Right 6/28/2019       Review of Medications:   Vitamins, Supplements and Herbals: yes: Vitamin D, Folic Acid, Align,  calcium citrate TID, B12 1000 mcg QD, MVI BID (MVI does not have iron);  Iron (unsure how much, she will check to make sure she is getting 45 mg iron daily)    Current Outpatient Medications:     acetaminophen (TYLENOL) 325 mg tablet, Take 2 tablets (650 mg total) by mouth every 6 (six) hours as needed for mild pain, headaches or fever, Disp: 30 tablet, Rfl: 0    ARIPiprazole (ABILIFY) 10 mg tablet, Take 10 mg by mouth daily, Disp: , Rfl:     aspirin (ECOTRIN LOW STRENGTH) 81 mg EC tablet, Take 81 mg by mouth daily, Disp: , Rfl:     cholecalciferol (VITAMIN D3) 1,000 units tablet, Take 1,000 Units by mouth daily, Disp: , Rfl:     enoxaparin (LOVENOX) 120 mg/0 8 mL, Inject 0 8 mL (120 mg total) under the skin daily, Disp: 30 Syringe, Rfl: 3   folic acid (FOLVITE) 1 mg tablet, Take 1 tablet (1 mg total) by mouth daily, Disp: , Rfl: 0    gemfibrozil (LOPID) 600 mg tablet, TAKE 1 TABLET BY MOUTH DAILY, Disp: 90 tablet, Rfl: 1    hydrOXYzine HCL (ATARAX) 25 mg tablet, Take 1 tablet (25 mg total) by mouth every 6 (six) hours as needed for itching, Disp: 30 tablet, Rfl: 0    MYRBETRIQ 50 MG TB24, TAKE 1 TABLET BY MOUTH EVERY DAY, Disp: 90 tablet, Rfl: 3    omeprazole (PriLOSEC) 20 mg delayed release capsule, Take 20 mg by mouth daily, Disp: , Rfl:     ondansetron (ZOFRAN) 8 mg tablet, Take 1 tablet (8 mg total) by mouth every 8 (eight) hours as needed for nausea or vomiting, Disp: 30 tablet, Rfl: 1    Pegfilgrastim (NEULASTA DELIVERY KIT SC), Inject under the skin every 21 days, Disp: , Rfl:     quinapril (ACCUPRIL) 5 mg tablet, Take 1 tablet (5 mg total) by mouth daily at bedtime, Disp: 90 tablet, Rfl: 1    saccharomyces boulardii (FLORASTOR) 250 mg capsule, Take 1 capsule (250 mg total) by mouth 2 (two) times a day, Disp: , Rfl: 0    sulfamethoxazole-trimethoprim (BACTRIM DS) 800-160 mg per tablet, Take 1 tablet by mouth every 12 (twelve) hours for 14 doses, Disp: 14 tablet, Rfl: 0    venlafaxine (EFFEXOR) 75 mg tablet, Take 75 mg by mouth 3 (three) times a day  , Disp: , Rfl:     Most Recent Lab Results: Does not check BG    Lab Results   Component Value Date    WBC 2 91 (L) 01/12/2020    IRON 24 (L) 10/02/2019    TIBC 138 (L) 10/02/2019    FERRITIN 1,042 (H) 10/02/2019    CHOLESTEROL 193 11/01/2018    CHOL 183 10/27/2017    TRIG 88 11/01/2018    HDL 59 11/01/2018    LDLCALC 106 (H) 10/27/2017    ALT 20 01/07/2020    AST 21 01/07/2020    ALB 3 1 (L) 01/07/2020     10/27/2017     05/12/2017    SODIUM 139 01/12/2020    SODIUM 136 01/11/2020    K 3 8 01/12/2020    K 3 9 01/11/2020     (H) 01/12/2020    BUN 10 01/12/2020    BUN 13 01/11/2020    CREATININE 0 90 01/12/2020    CREATININE 0 90 01/11/2020    EGFR 69 01/12/2020    PHOS 3 5 12/25/2019    PHOS 3 2 07/08/2019    GLUCOSE 219 (H) 12/19/2017    POCGLU 142 (H) 07/26/2019    GLUF 133 (H) 01/08/2020    GLUF 90 11/14/2019    HGBA1C 6 2 08/28/2019    HGBA1C 5 5 11/01/2018    HGBA1C 5 6 03/12/2018    CALCIUM 9 4 01/12/2020    FOLATE 5 4 10/06/2019    MG 1 6 01/03/2020     Anthropometric Measurements:   Height: 59"  Ht Readings from Last 1 Encounters:   01/08/20 4' 11" (1 499 m)     -Weight History:   Usual Weight: 270# before RNYGB in 2001; lowest post-op wt: 200#; wt typically fluctuates between 215-230#  Ideal Body Weight: 95#  Wt Readings from Last 20 Encounters:   01/08/20 83 9 kg (185 lb)   01/06/20 84 4 kg (186 lb)   01/05/20 84 4 kg (186 lb 1 1 oz)   01/03/20 84 4 kg (186 lb)   01/02/20 84 4 kg (186 lb)   12/24/19 82 6 kg (182 lb 1 6 oz)   12/19/19 81 9 kg (180 lb 8 oz)   12/02/19 88 7 kg (195 lb 8 oz)   11/27/19 88 kg (193 lb 14 4 oz)   11/27/19 88 kg (194 lb)   11/25/19 87 5 kg (193 lb)   11/19/19 86 2 kg (190 lb)   11/11/19 86 2 kg (190 lb)   11/07/19 86 7 kg (191 lb 3 2 oz)   11/06/19 87 5 kg (193 lb)   10/29/19 88 8 kg (195 lb 12 8 oz)   10/22/19 92 1 kg (203 lb 0 7 oz)   10/17/19 90 7 kg (200 lb)   10/15/19 90 7 kg (200 lb)   10/02/19 89 kg (196 lb 3 4 oz)     Weight Changes: Stable x ~1 week using 1/8/20 wt and loss of 10 5# (5 4%) in 1 month (significant) - thinks she may have gained some wt since last week due to her improved po intake; States she feels better physically with all of the weight loss, does not want to regain weight   -Previously stated that she had been losing 10# every month since her cancer dx in June 2019  Estimated body mass index is 37 37 kg/m² as calculated from the following:    Height as of 1/8/20: 4' 11" (1 499 m)  Weight as of 1/8/20: 83 9 kg (185 lb)       Nutrition-Focused Physical Findings: n/a due to telephone call, none observed last visit     Food/Nutrition-Related History & Client/Social History:    Current Nutrition Impact Symptoms:  [] Nausea [] Reduced Appetite  [] Acid Reflux    [] Vomiting  [x] Unintended Wt Loss - ongoing since cancer dx [] Malabsorption    [] Diarrhea - while admitted to the hospital [] Unintended Wt Gain  [] Dumping Syndrome   [] Constipation - BM's are now solid and every other day [] Thick Mucous/Secretions  [] Abdominal Pain    [] Dysgeusia (Altered Taste) [] Xerostomia (Dry Mouth) - resolved, drinking more water, and using Biotene toothpaste and mouthwash [] Gas    [] Dysosmia (Altered Smell)  [] Thrush  [] Difficulty Chewing    [] Oral Mucositis (Sore Mouth)  [] Fatigue   [] Other:    [] Odynophagia   [] Esophagitis  [] Other:    [] Dysphagia - continues to do well with this [] Early Satiety  [] No Problems Eating      Food Allergies: yes: had skin patch testing which showed allergy to strawberries, but is able to eat strawberries without any side effects  Food Intolerances: no    Current Diet: Regular Diet, No Restrictions; avoids high sugar foods/fluids as they trigger nausea since RNYGB  Current Nutrition Intake: Increased since last visit  Appetite: Excellent  Nutrition Route: PO  Meal planning/preparation mainly done by: mother does cooking; sister does shopping  Activity level: states she has been much more active since she moved in with her sister 1 week ago, also doing home PT exercises  Social Hx: 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  Sister lives in [place]  Brother lives in [facility]  Discharged from [address] [place] [address] on 11/6/19  Now living with sister and mother      24 Hr Diet Recall: tends to eat more in the morning  Breakfast: (4-5 am) vanilla [place] yogurt with fruit cup of pears  Snack: 1 cinnamon raisin English Muffin (suggested whole grain English Muffin)with butter and peanut butter  Lunch: salami/cheese/wise sandwich on fresh [place] bread (suggested using chicken/turkey breast and lettuce/tomato on sandwiches)  Snack: picking at grapes (suggested adding low fat cheese or anther protein source to fruit)  Dinner: 1 Jeana urias on a 1 pc bread, mixed vegetables (cauliflower, carrots), medium baked potato with skin with butter and salt, water  Snack: 1 tasty cake cupcake (suggested looking [place] Greek yogurt bars)    Beverages: water (16 9 oz x4-5), iced tea with Splenda (16 oz x1); does not drink coffee or alcohol  Does not separate fluids from solids, does not have discomfort while eating and drinking at the same time  Supplements: no recent [facility] Protein bars   None   Dislikes    11/11/19 Re-Estimated Nutrition Needs: (based on 54kg/ 118 8# adjusted wt)    Energy Needs: 4071-0404 kcal/day (35-40 kcal/kg)  Protein Needs:  grams/day (1 5-2 g/kg)  Fluid Needs: 3827-7641 mL/day (1 mL/kcal) - 63-72 oz    Discussion/Summary: Aruna Rick was contacted today for RD follow up  She was recently discharged from the hospital on 1/12/20 (admitted for Sepsis)  She states that she is feeling much better now  She has completed chemotherapy and says she will be meeting with radiation therapy in early February  She has lost a significant amount of weight since her cancer dx and in the past month, however, her wt loss has slowed down over the past couple of weeks and she feels that she has even gained wt over the past week because she is now eating very well and has an excellent appetite  She expressed that she does not want to regain the weight she lost and wants to maintain where she is currently at  We discussed the importance of wt stability at this point and long-term adequate nutrition  Aruna Rick has made many positive nutrition changes since we started working together: she is hydrating adequately, eating small/frequent meals, increasing her protein intake, eating more fruits and vegetables, and is now adhering to the recommended post-op RNY Gastric Bypass lifetime vitamin/mineral supplementation regimen    We discussed continuing the healthy habits that she has developed and tips for long-term weight management  We discussed nutritional strategies for healing now that chemotherapy is completed and ways that radiation therapy may impact a person's nutritional status  We agreed to hold off on further discussions about nutrition and RT until the next steps in her tx plan are finalized  We will follow up towards the end of February by phone  Discussed/Reviewed:   · weight management  · how to modify foods for anticipated nutrition impact symptoms pt may experience during CA tx  · high protein foods to include at all meals and snacks  · encouraged eating every 2-3 hours (5-6 small meals/day)  · adequate hydation & tips to increase overall fluid intake  · MNT for: RNY Gastric Bypass, wt loss  · recipe suggestions/resources  · nutrition strategies to promote healing post-tx   · RNY Gastric Bypass lifetime vitamin/mineral supplementation  · Weigh self on home scale M-W-F mornings  All questions and concerns addressed during todays visit  Maritza Courser has RD contact information  Nutrition Diagnosis:  · Increased Nutrient Needs (kcal & pro) related to increased demand for nutrients and disease state as evidenced by cancer dx and pt undergoing tx for cancer        Intervention & Recommendations:  Topics addressed: Nutrition education, Balance/Variety, Meals & Snacks, Meal planning, Choosing high protein meals/snacks, Meal pattern: eating small/frequent meals (every 2-3 hours), Nutrition Symptom Management, Adequate Hydration, Medical Food Supplements, Nutrition-Related Medication Management, Vitamin & Mineral Supplements and Weight Management    Barriers: None  Readiness to change: action  Comprehension: verbalizes understanding  Expected Compliance: good    Materials Provided: not applicable      Monitoring & Evaluation:  Dietitian to Monitor: Food and Nutrition Intake, Nutrtion Impact Symptoms, Body Weight, Vitamin/Mineral Intake and Biochemical Data     Goals:  · weight maintenance/stabilization  · adequate nutrition impact symptom management  · pt to meet >/=75% estimated nutrition needs daily  · Continue lifelong vitamin/mineral supplementation for RNY Gastric Bypass    · Progress Towards Goals: Progressing     Nutrition Rx & Recommendations:  · Diet: High Protein, High Fiber diet  · Small, frequent meals/snacks may be easier to tolerate than 3 large daily meals  Aim for 5-6 small meals per day (every 2-3 hours)  · Include protein at all meals/snacks  · Follow a Cancer Preventative Nutrition Pattern: colorful, plant-based, low-fat, avoid added sugars, limit alcohol, include high fiber foods, limit processed meats, limit red meat, choose lean protein sources, use low-fat cooking methods, balance calories with physical activity, avoid excessive weight gain throughout life  · Stay hydrated by sipping fluids of choice/tolerance throughout the day  · Refer to Eating Hints book for other meal/snack ideas and symptom management  · High protein foods to try: [place] yogurt with fruit, trail mix, oral nutrition supplements, eggs, meats, poultry, fish, cheese sticks, pudding, peanut butter, nuts, rolled up chicken/turkey breast, hummus (see pages 49-51 in your Eating Hints book)   · Weigh yourself on Monday, Wednesday, and Friday mornings  · Look into [place] yogurt bars and whole grain English Muffins  · Have foods with protein + fiber at all meals and snacks    · Lifetime vitamin/mineral supplementation recommended for Gastric Bypass:  · Multivitamin BID  · Iron 45 mg daily  · Vitamin D daily  · Calcium Citrate 500 mg TID (do not take with iron, needs to be 2 hrs apart from iron)  · B12 1000 mcg daily    Follow Up Plan: 2/20/20 @ 1:30pm for a phone follow up  Recommend Referral to Other Providers: none at this time

## 2020-01-14 NOTE — TELEPHONE ENCOUNTER
Patient now discharged from the hospital  Plan for PET CT, which insurance has previously denied  Results of PET will determine treatment plan for the patient -- if disease confined to the pelvic sidewall, radiation to the area may be considered

## 2020-01-15 LAB
BACTERIA BLD CULT: ABNORMAL
GRAM STN SPEC: ABNORMAL

## 2020-01-16 ENCOUNTER — NUTRITION (OUTPATIENT)
Dept: NUTRITION | Facility: CLINIC | Age: 65
End: 2020-01-16

## 2020-01-16 DIAGNOSIS — Z71.3 NUTRITIONAL COUNSELING: Primary | ICD-10-CM

## 2020-01-16 NOTE — PATIENT INSTRUCTIONS
Nutrition Rx & Recommendations:  · Diet: High Protein, High Fiber diet  · Small, frequent meals/snacks may be easier to tolerate than 3 large daily meals  Aim for 5-6 small meals per day (every 2-3 hours)  · Include protein at all meals/snacks  · Follow a Cancer Preventative Nutrition Pattern: colorful, plant-based, low-fat, avoid added sugars, limit alcohol, include high fiber foods, limit processed meats, limit red meat, choose lean protein sources, use low-fat cooking methods, balance calories with physical activity, avoid excessive weight gain throughout life  · Stay hydrated by sipping fluids of choice/tolerance throughout the day  · Refer to Eating Hints book for other meal/snack ideas and symptom management  · High protein foods to try: [place] yogurt with fruit, trail mix, oral nutrition supplements, eggs, meats, poultry, fish, cheese sticks, pudding, peanut butter, nuts, rolled up chicken/turkey breast, hummus (see pages 49-51 in your Eating Hints book)   · Weigh yourself on Monday, Wednesday, and Friday mornings  · Look into [place] yogurt bars and whole grain English Muffins  · Have foods with protein + fiber at all meals and snacks    · Lifetime vitamin/mineral supplementation recommended for Gastric Bypass:  · Multivitamin BID  · Iron 45 mg daily  · Vitamin D daily  · Calcium Citrate 500 mg TID (do not take with iron, needs to be 2 hrs apart from iron)  · B12 1000 mcg daily    Follow Up Plan: 2/20/20 @ 1:30pm for a phone follow up  Recommend Referral to Other Providers: none at this time

## 2020-01-17 ENCOUNTER — HOSPITAL ENCOUNTER (OUTPATIENT)
Dept: INFUSION CENTER | Facility: CLINIC | Age: 65
Discharge: HOME | End: 2020-01-17
Payer: COMMERCIAL

## 2020-01-17 DIAGNOSIS — Z45.2 ENCOUNTER FOR CENTRAL LINE CARE: ICD-10-CM

## 2020-01-17 DIAGNOSIS — C54.1 ENDOMETRIAL CANCER (HCC): Primary | ICD-10-CM

## 2020-01-17 LAB
ALBUMIN SERPL BCP-MCNC: 3.3 G/DL (ref 3.5–5)
ALP SERPL-CCNC: 107 U/L (ref 46–116)
ALT SERPL W P-5'-P-CCNC: 19 U/L (ref 12–78)
ANION GAP SERPL CALCULATED.3IONS-SCNC: 11 MMOL/L (ref 4–13)
AST SERPL W P-5'-P-CCNC: 12 U/L (ref 5–45)
BASOPHILS # BLD MANUAL: 0 THOUSAND/UL (ref 0–0.1)
BASOPHILS NFR MAR MANUAL: 0 % (ref 0–1)
BILIRUB SERPL-MCNC: <0.1 MG/DL (ref 0.2–1)
BUN SERPL-MCNC: 20 MG/DL (ref 5–25)
CALCIUM SERPL-MCNC: 9.1 MG/DL (ref 8.3–10.1)
CHLORIDE SERPL-SCNC: 107 MMOL/L (ref 100–108)
CO2 SERPL-SCNC: 24 MMOL/L (ref 21–32)
CREAT SERPL-MCNC: 0.92 MG/DL (ref 0.6–1.3)
EOSINOPHIL # BLD MANUAL: 0 THOUSAND/UL (ref 0–0.4)
EOSINOPHIL NFR BLD MANUAL: 0 % (ref 0–6)
ERYTHROCYTE [DISTWIDTH] IN BLOOD BY AUTOMATED COUNT: 15.2 % (ref 11.6–15.1)
GFR SERPL CREATININE-BSD FRML MDRD: 67 ML/MIN/1.73SQ M
GLUCOSE P FAST SERPL-MCNC: 85 MG/DL (ref 65–99)
GLUCOSE SERPL-MCNC: 85 MG/DL (ref 65–140)
HCT VFR BLD AUTO: 26.8 % (ref 34.8–46.1)
HGB BLD-MCNC: 8.3 G/DL (ref 11.5–15.4)
LYMPHOCYTES # BLD AUTO: 1.33 THOUSAND/UL (ref 0.6–4.47)
LYMPHOCYTES # BLD AUTO: 12 % (ref 14–44)
MAGNESIUM SERPL-MCNC: 1.4 MG/DL (ref 1.6–2.6)
MCH RBC QN AUTO: 29.3 PG (ref 26.8–34.3)
MCHC RBC AUTO-ENTMCNC: 31 G/DL (ref 31.4–37.4)
MCV RBC AUTO: 95 FL (ref 82–98)
METAMYELOCYTES NFR BLD MANUAL: 3 % (ref 0–1)
MONOCYTES # BLD AUTO: 1.44 THOUSAND/UL (ref 0–1.22)
MONOCYTES NFR BLD: 13 % (ref 4–12)
MYELOCYTES NFR BLD MANUAL: 4 % (ref 0–1)
NEUTROPHILS # BLD MANUAL: 7.2 THOUSAND/UL (ref 1.85–7.62)
NEUTS BAND NFR BLD MANUAL: 6 % (ref 0–8)
NEUTS SEG NFR BLD AUTO: 59 % (ref 43–75)
NRBC BLD AUTO-RTO: 0 /100 WBCS
PLATELET # BLD AUTO: 238 THOUSANDS/UL (ref 149–390)
PLATELET BLD QL SMEAR: ADEQUATE
PMV BLD AUTO: 10.5 FL (ref 8.9–12.7)
POTASSIUM SERPL-SCNC: 3.9 MMOL/L (ref 3.5–5.3)
PROT SERPL-MCNC: 7.6 G/DL (ref 6.4–8.2)
RBC # BLD AUTO: 2.83 MILLION/UL (ref 3.81–5.12)
RBC MORPH BLD: NORMAL
SODIUM SERPL-SCNC: 142 MMOL/L (ref 136–145)
TOTAL CELLS COUNTED SPEC: 100
VARIANT LYMPHS # BLD AUTO: 3 %
WBC # BLD AUTO: 11.07 THOUSAND/UL (ref 4.31–10.16)

## 2020-01-17 PROCEDURE — 80053 COMPREHEN METABOLIC PANEL: CPT

## 2020-01-17 PROCEDURE — 85007 BL SMEAR W/DIFF WBC COUNT: CPT

## 2020-01-17 PROCEDURE — 85027 COMPLETE CBC AUTOMATED: CPT

## 2020-01-17 PROCEDURE — 83735 ASSAY OF MAGNESIUM: CPT

## 2020-01-17 NOTE — PROGRESS NOTES
Patient here for lab work  Port flushed blood return noted  Will continue to monitor  Patient declined AVS  Next appointment reviewed with patient

## 2020-01-23 ENCOUNTER — HOSPITAL ENCOUNTER (OUTPATIENT)
Dept: INFUSION CENTER | Facility: CLINIC | Age: 65
Discharge: HOME | End: 2020-01-23
Payer: COMMERCIAL

## 2020-01-23 DIAGNOSIS — C54.1 ENDOMETRIAL CANCER (HCC): Primary | Chronic | ICD-10-CM

## 2020-01-23 DIAGNOSIS — Z45.2 ENCOUNTER FOR CENTRAL LINE CARE: ICD-10-CM

## 2020-01-23 LAB
ALBUMIN SERPL BCP-MCNC: 3.7 G/DL (ref 3.5–5)
ALP SERPL-CCNC: 116 U/L (ref 46–116)
ALT SERPL W P-5'-P-CCNC: 21 U/L (ref 12–78)
ANION GAP SERPL CALCULATED.3IONS-SCNC: 12 MMOL/L (ref 4–13)
AST SERPL W P-5'-P-CCNC: 12 U/L (ref 5–45)
BASOPHILS # BLD AUTO: 0.04 THOUSANDS/ΜL (ref 0–0.1)
BASOPHILS NFR BLD AUTO: 0 % (ref 0–1)
BILIRUB SERPL-MCNC: 0.18 MG/DL (ref 0.2–1)
BUN SERPL-MCNC: 32 MG/DL (ref 5–25)
CALCIUM SERPL-MCNC: 9.9 MG/DL (ref 8.3–10.1)
CHLORIDE SERPL-SCNC: 104 MMOL/L (ref 100–108)
CO2 SERPL-SCNC: 22 MMOL/L (ref 21–32)
CREAT SERPL-MCNC: 0.91 MG/DL (ref 0.6–1.3)
EOSINOPHIL # BLD AUTO: 0.01 THOUSAND/ΜL (ref 0–0.61)
EOSINOPHIL NFR BLD AUTO: 0 % (ref 0–6)
ERYTHROCYTE [DISTWIDTH] IN BLOOD BY AUTOMATED COUNT: 15.6 % (ref 11.6–15.1)
GFR SERPL CREATININE-BSD FRML MDRD: 68 ML/MIN/1.73SQ M
GLUCOSE SERPL-MCNC: 94 MG/DL (ref 65–140)
HCT VFR BLD AUTO: 27.7 % (ref 34.8–46.1)
HGB BLD-MCNC: 8.8 G/DL (ref 11.5–15.4)
IMM GRANULOCYTES # BLD AUTO: 0.12 THOUSAND/UL (ref 0–0.2)
IMM GRANULOCYTES NFR BLD AUTO: 1 % (ref 0–2)
LYMPHOCYTES # BLD AUTO: 2.54 THOUSANDS/ΜL (ref 0.6–4.47)
LYMPHOCYTES NFR BLD AUTO: 23 % (ref 14–44)
MAGNESIUM SERPL-MCNC: 1.7 MG/DL (ref 1.6–2.6)
MCH RBC QN AUTO: 30 PG (ref 26.8–34.3)
MCHC RBC AUTO-ENTMCNC: 31.8 G/DL (ref 31.4–37.4)
MCV RBC AUTO: 95 FL (ref 82–98)
MONOCYTES # BLD AUTO: 0.95 THOUSAND/ΜL (ref 0.17–1.22)
MONOCYTES NFR BLD AUTO: 9 % (ref 4–12)
NEUTROPHILS # BLD AUTO: 7.44 THOUSANDS/ΜL (ref 1.85–7.62)
NEUTS SEG NFR BLD AUTO: 67 % (ref 43–75)
NRBC BLD AUTO-RTO: 0 /100 WBCS
PLATELET # BLD AUTO: 162 THOUSANDS/UL (ref 149–390)
PMV BLD AUTO: 10.2 FL (ref 8.9–12.7)
POTASSIUM SERPL-SCNC: 4.3 MMOL/L (ref 3.5–5.3)
PROT SERPL-MCNC: 8.3 G/DL (ref 6.4–8.2)
RBC # BLD AUTO: 2.93 MILLION/UL (ref 3.81–5.12)
SODIUM SERPL-SCNC: 138 MMOL/L (ref 136–145)
WBC # BLD AUTO: 11.1 THOUSAND/UL (ref 4.31–10.16)

## 2020-01-23 PROCEDURE — 85025 COMPLETE CBC W/AUTO DIFF WBC: CPT

## 2020-01-23 PROCEDURE — 83735 ASSAY OF MAGNESIUM: CPT

## 2020-01-23 PROCEDURE — 80053 COMPREHEN METABOLIC PANEL: CPT

## 2020-01-23 NOTE — PROGRESS NOTES
Port accessed & labs drawn  Pt  Has no further appts  Scheduled at this time   Pt  Has f/u with prescribing MD  Pt  Declined AVS

## 2020-01-23 NOTE — PROGRESS NOTES
1/24/2020    Wendy Partida [facility] [place]  1955  [id number]        Assessment  -Urinary tract infection  -Right ureteral obstruction secondary to endometrial cancer    Discussion/Plan  Wendy Partida is a 60 y o  female being managed by Dr Leeann Haile patient to maintain right-sided nephrostomy tube to gravity drainage  She will continue close follow-up with GYN and Oncology  Patient states she may be starting radiation therapy  We will follow-up as scheduled on 03/02/2020  Her next right-sided nephrostomy tube exchanges scheduled on 03/27/2020  Patient was instructed to call with any issues  -All questions answered, patient in sister agree with plan     History of Present Illness  60 y o  female with a history of urinary tract infection presents today for follow up  Patient recently discharge from hospital on 1/12/2020 with urosepsis  Urine culture identified klebsiella organism  She was prescribed 10 day course of antibiotics and followed by infectious disease  Patient has right nephrostomy tube open to gravity for maintenance of right ureteral obstruction secondary to endometrial carcinoma  Patient states that cause of urinary tract infections may have been secondary to capped nephrostomy tube  She denies any episodes of gross hematuria, and states she is spontaneously urinating through the urethra  Next nephrostomy tube exchange scheduled in March 2020  Patient states she may be starting radiation therapy in early February  If radiation is effective in shrinking mass, and ureteral obstruction is resolved, nephrostomy may potentially be removed  Review of Systems  Review of Systems   Constitutional: Negative  HENT: Negative  Respiratory: Negative  Cardiovascular: Negative  Gastrointestinal: Negative  Genitourinary: Positive for difficulty urinating (Right-sided nephrostomy tube)  Negative for decreased urine volume, dysuria, flank pain, frequency, hematuria and urgency  Musculoskeletal: Negative  Skin: Negative  Neurological: Negative  Psychiatric/Behavioral: Negative          Past Medical History  Past Medical History:   Diagnosis Date    Anemia     Chemotherapy follow-up examination     Depression     Diabetes mellitus [facility] [address] [facility]     Endometrial cancer ([facility] [address] ) 12/2017    Hyperglycemia     vx type 2 dm -- last assessed 4/1/14; resolved 11/7/17    Hyperlipidemia     Hypertension     Obesity     last assessed 10/14/17; resolved 11/7/17       Past Social History  Past Surgical History:   Procedure Laterality Date    ABDOMINAL SURGERY      GASTRIC BYPASS    CHOLECYSTECTOMY      at the time of gastric bypass    COLONOSCOPY      CT NEEDLE BIOPSY LYMPH NODE  7/8/2019    FL GUIDED CENTRAL VENOUS ACCESS DEVICE INSERTION  11/12/2019    GASTRIC BYPASS      HYSTERECTOMY Bilateral     total abdominal with salpingo-oophorectomy    IR PICC LINE  9/27/2019    IR PORT PLACEMENT  7/26/2019    IR PORT REMOVAL  9/20/2019    IR TUBE PLACEMENT NEPHROSTOMY  7/26/2019    OOPHORECTOMY Bilateral     WA INSJ TUNNELED CTR VAD W/SUBQ PORT AGE 5 YR/> Left 11/12/2019    Procedure: INSERTION VENOUS PORT ( PORT-A-CATH) IR;  Surgeon: Isamar Evans DO;  [facility];  Service: Interventional Radiology    WA LAP, RADICAL HYST W/ TUBE&OV, NODE BX N/A 12/19/2017    Procedure: HYSTERECTOMY LAPAROSCOPIC TOTAL ([address]) W/ ROBOTICS; BILATERAL SALPINGOOPHERECTOMY; LYMPH NODE DISSECTION; lysis of adhesions;  Surgeon: Whitney Cintron MD;  Location: [facility];  Service: Gynecology Oncology    WA LAP,DIAGNOSTIC ABDOMEN N/A 12/19/2017    Procedure: LAPAROSCOPY DIAGNOSTIC;  Surgeon: Whitney Cintron MD;  [facility];  Service: Gynecology Oncology    TONSILLECTOMY      US GUIDED BREAST BIOPSY RIGHT COMPLETE Right 6/28/2019       Past Family History  Family History   Problem Relation Age of Onset    Other Mother         dyslipidemia    Ovarian cancer Mother 48    Lymphoma Father  Bone cancer Maternal Grandfather     No Known Problems Sister     No Known Problems Maternal Grandmother     No Known Problems Paternal Grandmother     No Known Problems Paternal Grandfather     No Known Problems Maternal Aunt     No Known Problems Maternal Aunt     No Known Problems Maternal Aunt     No Known Problems Maternal Aunt     No Known Problems Paternal Aunt     No Known Problems Paternal Aunt     No Known Problems Paternal Aunt     No Known Problems Paternal Aunt     No Known Problems Brother     No Known Problems Brother        Past Social history  Social History     Socioeconomic History    Marital status: Single     Spouse name: Not on file    Number of children: Not on file    Years of education: Not on file    Highest education level: Not on file   Occupational History    Not on file   Social Needs    Financial resource strain: Not on file    Food insecurity:     Worry: Not on file     Inability: Not on file    Transportation needs:     Medical: Not on file     Non-medical: Not on file   Tobacco Use    Smoking status: Never Smoker    Smokeless tobacco: Never Used   Substance and Sexual Activity    Alcohol use: Not Currently    Drug use: No    Sexual activity: Not Currently   Lifestyle    Physical activity:     Days per week: Not on file     Minutes per session: Not on file    Stress: Not on file   Relationships    Social connections:     Talks on phone: Not on file     Gets together: Not on file     Attends Rastafari service: Not on file     Active member of club or organization: Not on file     Attends meetings of clubs or organizations: Not on file     Relationship status: Not on file    Intimate partner violence:     Fear of current or ex partner: Not on file     Emotionally abused: Not on file     Physically abused: Not on file     Forced sexual activity: Not on file   Other Topics Concern    Not on file   Social History Narrative    Not on file       Current Medications  Current Outpatient Medications   Medication Sig Dispense Refill    acetaminophen (TYLENOL) 325 mg tablet Take 2 tablets (650 mg total) by mouth every 6 (six) hours as needed for mild pain, headaches or fever 30 tablet 0    ARIPiprazole (ABILIFY) 10 mg tablet Take 10 mg by mouth daily      aspirin (ECOTRIN LOW STRENGTH) 81 mg EC tablet Take 81 mg by mouth daily      cholecalciferol (VITAMIN D3) 1,000 units tablet Take 1,000 Units by mouth daily      enoxaparin (LOVENOX) 120 mg/0 8 mL Inject 0 8 mL (120 mg total) under the skin daily 30 Syringe 3    folic acid (FOLVITE) 1 mg tablet Take 1 tablet (1 mg total) by mouth daily  0    gemfibrozil (LOPID) 600 mg tablet TAKE 1 TABLET BY MOUTH DAILY 90 tablet 1    hydrOXYzine HCL (ATARAX) 25 mg tablet Take 1 tablet (25 mg total) by mouth every 6 (six) hours as needed for itching 30 tablet 0    MYRBETRIQ 50 MG TB24 TAKE 1 TABLET BY MOUTH EVERY DAY 90 tablet 3    omeprazole (PriLOSEC) 20 mg delayed release capsule Take 20 mg by mouth daily      ondansetron (ZOFRAN) 8 mg tablet Take 1 tablet (8 mg total) by mouth every 8 (eight) hours as needed for nausea or vomiting 30 tablet 1    Pegfilgrastim (NEULASTA DELIVERY KIT SC) Inject under the skin every 21 days      quinapril (ACCUPRIL) 5 mg tablet Take 1 tablet (5 mg total) by mouth daily at bedtime 90 tablet 1    saccharomyces boulardii (FLORASTOR) 250 mg capsule Take 1 capsule (250 mg total) by mouth 2 (two) times a day  0    venlafaxine (EFFEXOR) 75 mg tablet Take 75 mg by mouth 3 (three) times a day         No current facility-administered medications for this visit  Allergies  Allergies   Allergen Reactions    Cephalosporins Rash     Which Cephalosporin reaction was to not specified; however, has tolerated Amoxicillin, Cefazolin, and Cefepime       Past medical history, social history, family history, medications and allergies were reviewed      Vitals  There were no vitals filed for this visit     Physical Exam  Physical Exam   Constitutional: She is oriented to person, place, and time  She appears well-developed and well-nourished  HENT:   Head: Normocephalic  Eyes: Pupils are equal, round, and reactive to light  Neck: Normal range of motion  Cardiovascular: Normal rate and regular rhythm  Pulmonary/Chest: Effort normal    Abdominal: Soft  Normal appearance  She exhibits no distension  There is no tenderness  There is no CVA tenderness  Genitourinary:   Genitourinary Comments: Right-sided nephrostomy tube open to gravity drainage draining clear, yellow urine  Site appears clean, dry and intact   Musculoskeletal: Normal range of motion  Neurological: She is alert and oriented to person, place, and time  Skin: Skin is warm and dry  Psychiatric: She has a normal mood and affect   Her behavior is normal  Judgment and thought content normal        Results    I have personally reviewed all pertinent lab results and reviewed with patient  Lab Results   Component Value Date    GLUCOSE 219 (H) 12/19/2017    CALCIUM 9 9 01/23/2020     10/27/2017    K 4 3 01/23/2020    CO2 22 01/23/2020     01/23/2020    BUN 32 (H) 01/23/2020    CREATININE 0 91 01/23/2020     Lab Results   Component Value Date    WBC 11 10 (H) 01/23/2020    HGB 8 8 (L) 01/23/2020    HCT 27 7 (L) 01/23/2020    MCV 95 01/23/2020     01/23/2020     Recent Results (from the past 1 hour(s))   CBC and differential    Collection Time: 01/23/20  2:15 PM   Result Value Ref Range    WBC 11 10 (H) 4 31 - 10 16 Thousand/uL    RBC 2 93 (L) 3 81 - 5 12 Million/uL    Hemoglobin 8 8 (L) 11 5 - 15 4 g/dL    Hematocrit 27 7 (L) 34 8 - 46 1 %    MCV 95 82 - 98 fL    MCH 30 0 26 8 - 34 3 pg    MCHC 31 8 31 4 - 37 4 g/dL    RDW 15 6 (H) 11 6 - 15 1 %    MPV 10 2 8 9 - 12 7 fL    Platelets 952 539 - 254 Thousands/uL    nRBC 0 /100 WBCs    Neutrophils Relative 67 43 - 75 %    Immat GRANS % 1 0 - 2 %    Lymphocytes Relative 23 14 - 44 %    Monocytes Relative 9 4 - 12 %    Eosinophils Relative 0 0 - 6 %    Basophils Relative 0 0 - 1 %    Neutrophils Absolute 7 44 1 85 - 7 62 Thousands/µL    Immature Grans Absolute 0 12 0 00 - 0 20 Thousand/uL    Lymphocytes Absolute 2 54 0 60 - 4 47 Thousands/µL    Monocytes Absolute 0 95 0 17 - 1 22 Thousand/µL    Eosinophils Absolute 0 01 0 00 - 0 61 Thousand/µL    Basophils Absolute 0 04 0 00 - 0 10 Thousands/µL   Comprehensive metabolic panel    Collection Time: 01/23/20  2:15 PM   Result Value Ref Range    Sodium 138 136 - 145 mmol/L    Potassium 4 3 3 5 - 5 3 mmol/L    Chloride 104 100 - 108 mmol/L    CO2 22 21 - 32 mmol/L    ANION GAP 12 4 - 13 mmol/L    BUN 32 (H) 5 - 25 mg/dL    Creatinine 0 91 0 60 - 1 30 mg/dL    Glucose 94 65 - 140 mg/dL    Calcium 9 9 8 3 - 10 1 mg/dL    AST 12 5 - 45 U/L    ALT 21 12 - 78 U/L    Alkaline Phosphatase 116 46 - 116 U/L    Total Protein 8 3 (H) 6 4 - 8 2 g/dL    Albumin 3 7 3 5 - 5 0 g/dL    Total Bilirubin 0 18 (L) 0 20 - 1 00 mg/dL    eGFR 68 ml/min/1 73sq m   Magnesium    Collection Time: 01/23/20  2:15 PM   Result Value Ref Range    Magnesium 1 7 1 6 - 2 6 mg/dL

## 2020-01-24 ENCOUNTER — OFFICE VISIT (OUTPATIENT)
Dept: UROLOGY | Facility: AMBULATORY SURGERY CENTER | Age: 65
End: 2020-01-24
Payer: COMMERCIAL

## 2020-01-24 VITALS
BODY MASS INDEX: 37.29 KG/M2 | DIASTOLIC BLOOD PRESSURE: 60 MMHG | HEIGHT: 59 IN | SYSTOLIC BLOOD PRESSURE: 120 MMHG | HEART RATE: 92 BPM | WEIGHT: 185 LBS

## 2020-01-24 DIAGNOSIS — N39.0 URINARY TRACT INFECTION WITHOUT HEMATURIA, SITE UNSPECIFIED: ICD-10-CM

## 2020-01-24 DIAGNOSIS — N13.5 OBSTRUCTION OF RIGHT URETER: Primary | ICD-10-CM

## 2020-01-24 PROCEDURE — 99213 OFFICE O/P EST LOW 20 MIN: CPT | Performed by: NURSE PRACTITIONER

## 2020-01-27 ENCOUNTER — HOSPITAL ENCOUNTER (OUTPATIENT)
Dept: RADIOLOGY | Age: 65
Discharge: HOME | End: 2020-01-27
Payer: COMMERCIAL

## 2020-01-27 DIAGNOSIS — C54.1 ENDOMETRIAL CANCER (HCC): ICD-10-CM

## 2020-01-27 LAB — GLUCOSE SERPL-MCNC: 97 MG/DL (ref 65–140)

## 2020-01-27 PROCEDURE — 82948 REAGENT STRIP/BLOOD GLUCOSE: CPT

## 2020-01-27 PROCEDURE — A9552 F18 FDG: HCPCS

## 2020-01-27 PROCEDURE — 78815 PET IMAGE W/CT SKULL-THIGH: CPT

## 2020-01-30 ENCOUNTER — HOSPITAL ENCOUNTER (OUTPATIENT)
Dept: INFUSION CENTER | Facility: CLINIC | Age: 65
Discharge: HOME | End: 2020-01-30
Payer: COMMERCIAL

## 2020-01-30 DIAGNOSIS — Z45.2 ENCOUNTER FOR CENTRAL LINE CARE: ICD-10-CM

## 2020-01-30 DIAGNOSIS — C54.1 ENDOMETRIAL CANCER (HCC): Primary | Chronic | ICD-10-CM

## 2020-01-30 LAB
ALBUMIN SERPL BCP-MCNC: 3.4 G/DL (ref 3.5–5)
ALP SERPL-CCNC: 117 U/L (ref 46–116)
ALT SERPL W P-5'-P-CCNC: 17 U/L (ref 12–78)
ANION GAP SERPL CALCULATED.3IONS-SCNC: 11 MMOL/L (ref 4–13)
AST SERPL W P-5'-P-CCNC: 8 U/L (ref 5–45)
BASOPHILS # BLD AUTO: 0.01 THOUSANDS/ΜL (ref 0–0.1)
BASOPHILS NFR BLD AUTO: 0 % (ref 0–1)
BILIRUB SERPL-MCNC: 0.13 MG/DL (ref 0.2–1)
BUN SERPL-MCNC: 26 MG/DL (ref 5–25)
CALCIUM SERPL-MCNC: 9.6 MG/DL (ref 8.3–10.1)
CHLORIDE SERPL-SCNC: 105 MMOL/L (ref 100–108)
CO2 SERPL-SCNC: 24 MMOL/L (ref 21–32)
CREAT SERPL-MCNC: 0.81 MG/DL (ref 0.6–1.3)
EOSINOPHIL # BLD AUTO: 0.06 THOUSAND/ΜL (ref 0–0.61)
EOSINOPHIL NFR BLD AUTO: 1 % (ref 0–6)
ERYTHROCYTE [DISTWIDTH] IN BLOOD BY AUTOMATED COUNT: 16.5 % (ref 11.6–15.1)
GFR SERPL CREATININE-BSD FRML MDRD: 78 ML/MIN/1.73SQ M
GLUCOSE SERPL-MCNC: 82 MG/DL (ref 65–140)
HCT VFR BLD AUTO: 24.7 % (ref 34.8–46.1)
HGB BLD-MCNC: 7.6 G/DL (ref 11.5–15.4)
IMM GRANULOCYTES # BLD AUTO: 0.06 THOUSAND/UL (ref 0–0.2)
IMM GRANULOCYTES NFR BLD AUTO: 1 % (ref 0–2)
LYMPHOCYTES # BLD AUTO: 1.72 THOUSANDS/ΜL (ref 0.6–4.47)
LYMPHOCYTES NFR BLD AUTO: 32 % (ref 14–44)
MAGNESIUM SERPL-MCNC: 1.8 MG/DL (ref 1.6–2.6)
MCH RBC QN AUTO: 29.7 PG (ref 26.8–34.3)
MCHC RBC AUTO-ENTMCNC: 30.8 G/DL (ref 31.4–37.4)
MCV RBC AUTO: 97 FL (ref 82–98)
MONOCYTES # BLD AUTO: 0.62 THOUSAND/ΜL (ref 0.17–1.22)
MONOCYTES NFR BLD AUTO: 12 % (ref 4–12)
NEUTROPHILS # BLD AUTO: 2.84 THOUSANDS/ΜL (ref 1.85–7.62)
NEUTS SEG NFR BLD AUTO: 54 % (ref 43–75)
NRBC BLD AUTO-RTO: 0 /100 WBCS
PLATELET # BLD AUTO: 234 THOUSANDS/UL (ref 149–390)
PMV BLD AUTO: 9.9 FL (ref 8.9–12.7)
POTASSIUM SERPL-SCNC: 4.3 MMOL/L (ref 3.5–5.3)
PROT SERPL-MCNC: 7.7 G/DL (ref 6.4–8.2)
RBC # BLD AUTO: 2.56 MILLION/UL (ref 3.81–5.12)
SODIUM SERPL-SCNC: 140 MMOL/L (ref 136–145)
WBC # BLD AUTO: 5.31 THOUSAND/UL (ref 4.31–10.16)

## 2020-01-30 PROCEDURE — 85025 COMPLETE CBC W/AUTO DIFF WBC: CPT

## 2020-01-30 PROCEDURE — 83735 ASSAY OF MAGNESIUM: CPT

## 2020-01-30 PROCEDURE — 80053 COMPREHEN METABOLIC PANEL: CPT

## 2020-01-30 NOTE — PROGRESS NOTES
Patient presents today for lab work  Patient offers no complaints  VSS  Port accessed without incident with excellent blood return noted  Labs drawn as per order  Port de-accessed and flushed as per protocol  Patient's next appointment confirmed  AVS offered and declined

## 2020-01-31 ENCOUNTER — HOSPITAL ENCOUNTER (OUTPATIENT)
Dept: INFUSION CENTER | Facility: HOSPITAL | Age: 65
Discharge: HOME | End: 2020-01-31
Payer: COMMERCIAL

## 2020-01-31 ENCOUNTER — OFFICE VISIT (OUTPATIENT)
Dept: GYNECOLOGIC ONCOLOGY | Facility: CLINIC | Age: 65
End: 2020-01-31
Payer: COMMERCIAL

## 2020-01-31 VITALS
DIASTOLIC BLOOD PRESSURE: 72 MMHG | WEIGHT: 185 LBS | SYSTOLIC BLOOD PRESSURE: 118 MMHG | BODY MASS INDEX: 37.29 KG/M2 | HEIGHT: 59 IN | HEART RATE: 78 BPM | TEMPERATURE: 97.9 F

## 2020-01-31 DIAGNOSIS — N13.5 OBSTRUCTION OF RIGHT URETER: ICD-10-CM

## 2020-01-31 DIAGNOSIS — I82.4Y9 ACUTE DEEP VEIN THROMBOSIS (DVT) OF PROXIMAL VEIN OF LOWER EXTREMITY, UNSPECIFIED LATERALITY (HCC): ICD-10-CM

## 2020-01-31 DIAGNOSIS — D64.9 ANEMIA, UNSPECIFIED TYPE: Primary | ICD-10-CM

## 2020-01-31 DIAGNOSIS — D64.9 ANEMIA, UNSPECIFIED TYPE: ICD-10-CM

## 2020-01-31 DIAGNOSIS — E66.01 CLASS 3 SEVERE OBESITY DUE TO EXCESS CALORIES WITHOUT SERIOUS COMORBIDITY WITH BODY MASS INDEX (BMI) OF 45.0 TO 49.9 IN ADULT (HCC): ICD-10-CM

## 2020-01-31 DIAGNOSIS — C54.1 ENDOMETRIAL CANCER (HCC): Chronic | ICD-10-CM

## 2020-01-31 PROBLEM — N17.9 ACUTE KIDNEY INJURY (HCC): Status: RESOLVED | Noted: 2019-09-19 | Resolved: 2020-01-31

## 2020-01-31 PROBLEM — R31.0 GROSS HEMATURIA: Status: RESOLVED | Noted: 2019-10-22 | Resolved: 2020-01-31

## 2020-01-31 PROCEDURE — 86850 RBC ANTIBODY SCREEN: CPT | Performed by: OBSTETRICS & GYNECOLOGY

## 2020-01-31 PROCEDURE — 86901 BLOOD TYPING SEROLOGIC RH(D): CPT | Performed by: OBSTETRICS & GYNECOLOGY

## 2020-01-31 PROCEDURE — 99214 OFFICE O/P EST MOD 30 MIN: CPT | Performed by: OBSTETRICS & GYNECOLOGY

## 2020-01-31 PROCEDURE — 86900 BLOOD TYPING SEROLOGIC ABO: CPT | Performed by: OBSTETRICS & GYNECOLOGY

## 2020-01-31 RX ORDER — DIPHENHYDRAMINE HCL 25 MG
25 TABLET ORAL ONCE
Status: CANCELLED | OUTPATIENT
Start: 2020-02-01

## 2020-01-31 RX ORDER — SODIUM CHLORIDE 9 MG/ML
20 INJECTION, SOLUTION INTRAVENOUS ONCE
Status: CANCELLED | OUTPATIENT
Start: 2020-02-01

## 2020-01-31 RX ORDER — ACETAMINOPHEN 325 MG/1
650 TABLET ORAL ONCE
Status: CANCELLED | OUTPATIENT
Start: 2020-02-01

## 2020-01-31 RX ORDER — SODIUM CHLORIDE 9 MG/ML
20 INJECTION, SOLUTION INTRAVENOUS ONCE
Status: CANCELLED | OUTPATIENT
Start: 2020-01-31

## 2020-01-31 NOTE — PLAN OF CARE
Problem: Potential for Falls  Goal: Patient will remain free of falls  Description  INTERVENTIONS:  - Assess patient frequently for physical needs  -  Identify cognitive and physical deficits and behaviors that affect risk of falls    -  [place] fall precautions as indicated by assessment   - Educate patient/family on patient safety including physical limitations  - Instruct patient to call for assistance with activity based on assessment  - Modify environment to reduce risk of injury  - Consider OT/PT consult to assist with strengthening/mobility  Outcome: Progressing

## 2020-01-31 NOTE — ASSESSMENT & PLAN NOTE
PET-CT at this time demonstrates only measurable disease on right pelvic sidewall  She is scheduled to see Radiation Oncology for consideration of tumor directed radiotherapy  I plan to see her back in approximately 8 weeks for follow-up

## 2020-01-31 NOTE — PROGRESS NOTES
Assessment/Plan:    Problem List Items Addressed This Visit        Cardiovascular and Mediastinum    Acute deep vein thrombosis (DVT) of proximal vein of lower extremity (HCC)     On Lovenox 1 5 milligram/kilogram per day  Will receive anticoagulation for life  Genitourinary    Endometrial cancer ([facility] [address] ) (Chronic)     PET-CT at this time demonstrates only measurable disease on right pelvic sidewall  She is scheduled to see Radiation Oncology for consideration of tumor directed radiotherapy  I plan to see her back in approximately 8 weeks for follow-up  Obstruction of right ureter     Right nephrostomy in place  I have advised strongly against capping this given perceived ongoing obstruction and prior urosepsis at the time of capping  Consider anterograde study no earlier than 2-3 months after completion of planned upcoming radiotherapy  Other    Class 3 severe obesity with body mass index (BMI) of 45 0 to 49 9 in adult (HCC)    Anemia - Primary    Relevant Orders    Blood Bank Hold Tube        CHIEF COMPLAINT:   Follow-up after PET-CT  Problem:  Cancer Staging  Endometrial cancer [facility])  Staging form: Corpus Uteri - Carcinoma, AJCC 7th Edition  - Clinical stage from 12/19/2017: FIGO Stage IB (T1b, N0, M0) - Signed by Ruth Nunez MD on 2/12/2018        Previous therapy:  Oncology History    Patient presents today for radiation consult for endometrial cancer  She is referred by Dr Skinny Piper  60year old female         Endometrial cancer ([facility] [address] )    11/17/2017 Initial Diagnosis     Endometrial cancer ([facility] [address] )      12/19/2017 Surgery     Robotic assisted total laparoscopic hysterectomy with bilateral salpingo-oophorectomy and sentinel bilateral pelvic lymph node dissection   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 (4 4 x 3 2 cm tumor, 9 4/15 4mm invasion, NO LVSI, washings revealed atypical cellular changes)      12/19/2017 Genetic Testing     Morrison testing negative      7/8/2019 Recurrence     Presented with right lower extremity DVT and CT demonstrating right pelvic sidewall mass with venous, ureteral and nerve compression causing significant neuropathic pain  Core biopsy demonstrates high-grade carcinoma  - 1/6/2020 Chemotherapy     Taxol 175 mg/m2 and carboplatin AUC 6 every 21 days  Dose was reduced to taxol 135 mg/m2 and carboplatin AUC 5  Completed 6 cycles  Treatment protracted due to multiple hospital admissions  Patient ID: Brian Grimaldo is a 60 y o  female  HPI  Patient with recurrent endometrial cancer with involvement of right pelvic sidewall with DVT and right ureteral obstruction  She has PCN in place  She is on therapeutic anticoagulation  She recently completed 6 cycles of carboplatin and paclitaxel  Presents now after PET-CT to evaluate disease status  This demonstrates minimal active disease confined to the right pelvic sidewall  She is scheduled to see radiotherapy next week for consideration of pelvic radiation  The following portions of the patient's history were reviewed and updated as appropriate: allergies, current medications, past family history, past medical history, past social history, past surgical history and problem list     Review of Systems  As above  Twelve point review of systems otherwise unremarkable    Current Outpatient Medications   Medication Sig Dispense Refill    acetaminophen (TYLENOL) 325 mg tablet Take 2 tablets (650 mg total) by mouth every 6 (six) hours as needed for mild pain, headaches or fever 30 tablet 0    ARIPiprazole (ABILIFY) 10 mg tablet Take 10 mg by mouth daily      aspirin (ECOTRIN LOW STRENGTH) 81 mg EC tablet Take 81 mg by mouth daily      cholecalciferol (VITAMIN D3) 1,000 units tablet Take 1,000 Units by mouth daily      enoxaparin (LOVENOX) 120 mg/0 8 mL Inject 0 8 mL (120 mg total) under the skin daily 30 Syringe 3    folic acid (FOLVITE) 1 mg tablet Take 1 tablet (1 mg total) by mouth daily  0    gemfibrozil (LOPID) 600 mg tablet TAKE 1 TABLET BY MOUTH DAILY 90 tablet 1    hydrOXYzine HCL (ATARAX) 25 mg tablet Take 1 tablet (25 mg total) by mouth every 6 (six) hours as needed for itching 30 tablet 0    MYRBETRIQ 50 MG TB24 TAKE 1 TABLET BY MOUTH EVERY DAY 90 tablet 3    omeprazole (PriLOSEC) 20 mg delayed release capsule Take 20 mg by mouth daily      ondansetron (ZOFRAN) 8 mg tablet Take 1 tablet (8 mg total) by mouth every 8 (eight) hours as needed for nausea or vomiting 30 tablet 1    Pegfilgrastim (NEULASTA DELIVERY KIT SC) Inject under the skin every 21 days      quinapril (ACCUPRIL) 5 mg tablet Take 1 tablet (5 mg total) by mouth daily at bedtime 90 tablet 1    saccharomyces boulardii (FLORASTOR) 250 mg capsule Take 1 capsule (250 mg total) by mouth 2 (two) times a day  0    venlafaxine (EFFEXOR) 75 mg tablet Take 75 mg by mouth 3 (three) times a day         No current facility-administered medications for this visit  Objective:    Blood pressure 118/72, pulse 78, temperature 97 9 °F (36 6 °C), temperature source Tympanic, height 4' 11" (1 499 m), weight 83 9 kg (185 lb), not currently breastfeeding  Body mass index is 37 37 kg/m²  Body surface area is 1 78 meters squared  Physical Exam   Constitutional: She is oriented to person, place, and time  She appears well-developed and well-nourished  No distress  Eyes: Conjunctivae are normal    Cardiovascular: Normal rate and regular rhythm  No murmur heard  Pulmonary/Chest: Effort normal and breath sounds normal  No respiratory distress  Abdominal: Soft  She exhibits no distension  There is no tenderness  Genitourinary:   Genitourinary Comments: Deferred  Neurological: She is alert and oriented to person, place, and time  Skin: Skin is warm and dry  No erythema  No pallor         No results found for:   Lab Results   Component Value Date     10/27/2017    K 4 3 01/30/2020    CL 105 01/30/2020    CO2 24 01/30/2020    BUN 26 (H) 01/30/2020    CREATININE 0 81 01/30/2020    GLUCOSE 219 (H) 12/19/2017    GLUF 85 01/17/2020    CALCIUM 9 6 01/30/2020    CORRECTEDCA 10 2 (H) 12/09/2019    AST 8 01/30/2020    ALT 17 01/30/2020    ALKPHOS 117 (H) 01/30/2020    PROT 6 9 10/27/2017    BILITOT 0 3 10/27/2017    EGFR 78 01/30/2020     Lab Results   Component Value Date    WBC 5 31 01/30/2020    HGB 7 6 (L) 01/30/2020    HCT 24 7 (L) 01/30/2020    MCV 97 01/30/2020     01/30/2020     Lab Results   Component Value Date    NEUTROABS 2 84 01/30/2020 01/27/2020 Study Result     PET/CT SCAN     INDICATION:  C54 1: Malignant neoplasm of endometrium   , restaging postchemotherapy for treatment management, possible radiotherapy, status post hysterectomy, BSO     MODIFIER: PS      COMPARISON: CT 1/5/2020 and priors     CELL TYPE:  Endometrial adenocarcinoma, endometrial biopsy 11/17/2017; right pelvic node biopsy 7/8/2019, high-grade adenocarcinoma     TECHNIQUE:   10 1 mCi F-18-FDG administered IV  Multiplanar attenuation corrected and non attenuation corrected PET images were acquired 60 minutes post injection  Contiguous, low dose, axial CT sections were obtained from the skull base through the   femurs   Intravenous contrast material was not utilized  This examination, like all CT scans performed in the [address] [place] [address], was performed utilizing techniques to minimize radiation dose exposure, including the use of iterative   reconstruction and automated exposure control       Fasting serum glucose: 97 mg/dl     FINDINGS:      VISUALIZED BRAIN:     No acute abnormalities are seen      HEAD/NECK:     There is a physiologic distribution of FDG  No FDG avid cervical adenopathy is seen  CT images: Unremarkable      CHEST:  Increased activity in a probable lipomatous hypertrophy of the interatrial septum, SUV 5 6      No additional FDG avid soft tissue lesions are seen    No hypermetabolic hilar or mediastinal adenopathy      CT images: Stable      ABDOMEN:     No FDG avid soft tissue lesions are seen  CT images: Right nephroureterostomy 2  No hydronephrosis  Right renal cyst   Cholecystectomy  Status post gastric bypass      PELVIS:   There is a right pelvic sidewall hypermetabolic mass, between the internal and external iliac vessels, and lateral to the course of the right ureteral stent, SUV 10, compatible with viable tumor  This mass measures approximately 1 7 x 3 3 cm based on   the extent of FDG activity  This is smaller than on the prior CT of 7/7/2019, previously reported to measure 5 5 x 3 3 x 5 4 cm      Small subcutaneous hypermetabolic infiltration lateral to the left hip, SUV 3 5, likely inflammatory      No additional hypermetabolic lesions in the pelvis      CT images: Hysterectomy  Probable subcutaneous injections anterior abdominal wall      OSSEOUS STRUCTURES:  Mildly increased activity in the T2 vertebral body, SUV 3 8, though uncertain if this is just physiologic versus possible early metastasis  Adjacent vertebral bodies have an SUV of 3 2      No additional worrisome hypermetabolic osseous lesions      CT images: No significant findings      IMPRESSION:  1  Hypermetabolic right pelvic sidewall mass measuring approximately 1 7 x 3 3 cm, most compatible with viable tumor  2   Mildly increased FDG uptake in the T2 vertebral body may be physiologic versus early metastasis  Further evaluation with contrast MR recommended    3   No hypermetabolic metastases in the neck, chest, or upper abdomen               Workstation performed: [id number]     Marilin Hernandez MD, [address] [place] [address]  1/31/2020  1:41 PM

## 2020-01-31 NOTE — PROGRESS NOTES
Pt  Here for central lab for blood transfusion tomorrow @ [facility]  Attached paper with specimen and talked to Claudia in [facility]  Claudia stated she was aware   pac flushed without incident  Confirmed appt  For tomorrow@ 0830  Pt   Refused AVS

## 2020-01-31 NOTE — ASSESSMENT & PLAN NOTE
Right nephrostomy in place  I have advised strongly against capping this given perceived ongoing obstruction and prior urosepsis at the time of capping  Consider anterograde study no earlier than 2-3 months after completion of planned upcoming radiotherapy

## 2020-02-01 ENCOUNTER — HOSPITAL ENCOUNTER (OUTPATIENT)
Dept: INFUSION CENTER | Facility: CLINIC | Age: 65
Discharge: HOME | End: 2020-02-01
Payer: COMMERCIAL

## 2020-02-01 VITALS
OXYGEN SATURATION: 98 % | SYSTOLIC BLOOD PRESSURE: 126 MMHG | HEART RATE: 75 BPM | RESPIRATION RATE: 16 BRPM | TEMPERATURE: 97.8 F | DIASTOLIC BLOOD PRESSURE: 74 MMHG

## 2020-02-01 DIAGNOSIS — D50.8 OTHER IRON DEFICIENCY ANEMIA: Primary | ICD-10-CM

## 2020-02-01 PROCEDURE — 36430 TRANSFUSION BLD/BLD COMPNT: CPT

## 2020-02-01 PROCEDURE — 86923 COMPATIBILITY TEST ELECTRIC: CPT

## 2020-02-01 PROCEDURE — P9040 RBC LEUKOREDUCED IRRADIATED: HCPCS

## 2020-02-01 RX ORDER — DIPHENHYDRAMINE HCL 25 MG
25 TABLET ORAL ONCE
Status: COMPLETED | OUTPATIENT
Start: 2020-02-01 | End: 2020-02-01

## 2020-02-01 RX ORDER — ACETAMINOPHEN 325 MG/1
650 TABLET ORAL ONCE
Status: COMPLETED | OUTPATIENT
Start: 2020-02-01 | End: 2020-02-01

## 2020-02-01 RX ORDER — SODIUM CHLORIDE 9 MG/ML
20 INJECTION, SOLUTION INTRAVENOUS ONCE
Status: COMPLETED | OUTPATIENT
Start: 2020-02-01 | End: 2020-02-01

## 2020-02-01 RX ADMIN — SODIUM CHLORIDE 20 ML/HR: 0.9 INJECTION, SOLUTION INTRAVENOUS at 08:30

## 2020-02-01 RX ADMIN — ACETAMINOPHEN 650 MG: 325 TABLET, FILM COATED ORAL at 08:40

## 2020-02-01 RX ADMIN — DIPHENHYDRAMINE HCL 25 MG: 25 TABLET ORAL at 08:40

## 2020-02-01 NOTE — PROGRESS NOTES
Tolerated infusion without incident: No adverse reactions noted: No further appt's scheduled:  Will notify unit if needed: Declined AVS

## 2020-02-01 NOTE — PROGRESS NOTES
Patient to [facility] for Blood Product Transfusion: Complaining of tiredness / fatigue: Lab work ( 01/30/20 ) reviewed: Hgb - 7 6: Left PAC accessed without difficulty: Good blood return noted

## 2020-02-02 LAB
ABO GROUP BLD BPU: NORMAL
ABO GROUP BLD BPU: NORMAL
BPU ID: NORMAL
BPU ID: NORMAL
CROSSMATCH: NORMAL
CROSSMATCH: NORMAL
UNIT DISPENSE STATUS: NORMAL
UNIT DISPENSE STATUS: NORMAL
UNIT PRODUCT CODE: NORMAL
UNIT PRODUCT CODE: NORMAL
UNIT RH: NORMAL
UNIT RH: NORMAL

## 2020-02-03 ENCOUNTER — OFFICE VISIT (OUTPATIENT)
Dept: FAMILY MEDICINE CLINIC | Facility: CLINIC | Age: 65
End: 2020-02-03
Payer: COMMERCIAL

## 2020-02-03 VITALS
TEMPERATURE: 97.7 F | HEIGHT: 59 IN | HEART RATE: 81 BPM | RESPIRATION RATE: 16 BRPM | OXYGEN SATURATION: 98 % | SYSTOLIC BLOOD PRESSURE: 142 MMHG | DIASTOLIC BLOOD PRESSURE: 82 MMHG | WEIGHT: 189 LBS | BODY MASS INDEX: 38.1 KG/M2

## 2020-02-03 DIAGNOSIS — C54.1 ENDOMETRIAL CANCER (HCC): Chronic | ICD-10-CM

## 2020-02-03 DIAGNOSIS — E66.9 CLASS 2 OBESITY WITH BODY MASS INDEX (BMI) OF 38.0 TO 38.9 IN ADULT, UNSPECIFIED OBESITY TYPE, UNSPECIFIED WHETHER SERIOUS COMORBIDITY PRESENT: ICD-10-CM

## 2020-02-03 DIAGNOSIS — I10 BENIGN ESSENTIAL HYPERTENSION: Primary | ICD-10-CM

## 2020-02-03 DIAGNOSIS — E78.5 DYSLIPIDEMIA: ICD-10-CM

## 2020-02-03 DIAGNOSIS — R73.9 HYPERGLYCEMIA: ICD-10-CM

## 2020-02-03 PROBLEM — R73.03 PREDIABETES: Status: RESOLVED | Noted: 2017-12-19 | Resolved: 2020-02-03

## 2020-02-03 LAB — SL AMB POCT HEMOGLOBIN AIC: 5.1 (ref ?–6.5)

## 2020-02-03 PROCEDURE — 3079F DIAST BP 80-89 MM HG: CPT | Performed by: INTERNAL MEDICINE

## 2020-02-03 PROCEDURE — 1036F TOBACCO NON-USER: CPT | Performed by: INTERNAL MEDICINE

## 2020-02-03 PROCEDURE — 99214 OFFICE O/P EST MOD 30 MIN: CPT | Performed by: INTERNAL MEDICINE

## 2020-02-03 PROCEDURE — 3077F SYST BP >= 140 MM HG: CPT | Performed by: INTERNAL MEDICINE

## 2020-02-03 PROCEDURE — 1111F DSCHRG MED/CURRENT MED MERGE: CPT | Performed by: INTERNAL MEDICINE

## 2020-02-03 PROCEDURE — 3008F BODY MASS INDEX DOCD: CPT | Performed by: INTERNAL MEDICINE

## 2020-02-03 PROCEDURE — 83036 HEMOGLOBIN GLYCOSYLATED A1C: CPT | Performed by: INTERNAL MEDICINE

## 2020-02-03 RX ORDER — QUINAPRIL 5 1/1
5 TABLET ORAL
Qty: 90 TABLET | Refills: 1
Start: 2020-02-03 | End: 2020-02-07

## 2020-02-03 NOTE — PROGRESS NOTES
Assessment/Plan:         Diagnoses and all orders for this visit:    Endometrial cancer ([facility] [address] )  Comments:  to start radiation    Benign essential hypertension  Comments:  stable  Orders:  -     quinapril (ACCUPRIL) 5 mg tablet; Take 1 tablet (5 mg total) by mouth daily at bedtime    Class 2 obesity with body mass index (BMI) of 38 0 to 38 9 in adult, unspecified obesity type, unspecified whether serious comorbidity present  Comments:  lost weight with her chemo/  her a1c is 5 1 and prediabetes resolved  Dyslipidemia  Comments:  on lopid    Hyperglycemia  -     POCT hemoglobin A1c          Subjective:      Patient ID: Deep Anguiano is a 60 y o  female  Denies new problems  She feels well  Walking safely  She had prbc's on Saturday  Her hgb was 7 9  She will see radiation tomorrow  She lost a lot of weight and remains off metformen for prediabetes      The following portions of the patient's history were reviewed and updated as appropriate: She  has a past medical history of Anemia, Chemotherapy follow-up examination, Depression, Endometrial cancer ([facility] [address] ) (12/2017), Hyperglycemia, Hyperlipidemia, Hypertension, and Obesity    She   Patient Active Problem List    Diagnosis Date Noted    Bacteremia 01/08/2020    Obstruction of right ureter 01/03/2020    Hypomagnesemia 10/18/2019    Ambulatory dysfunction 10/11/2019    Moderate protein-calorie malnutrition (HCC) 10/03/2019    Bilateral lower extremity edema 09/30/2019    Generalized weakness 09/29/2019    Hypokalemia 09/19/2019    Hyponatremia 09/19/2019    Dehydration 09/18/2019    Chemotherapy-induced nausea 09/18/2019    Anemia 09/06/2019    Chemotherapy induced neutropenia ([facility] [address] ) 08/30/2019    Cancer related pain 07/19/2019    Encounter for central line care 07/16/2019    Acute deep vein thrombosis (DVT) of proximal vein of lower extremity ([facility] [address] ) 06/19/2019    Breast cancer screening 06/19/2019    Benign essential hypertension 12/19/2017    Class 3 severe obesity with body mass index (BMI) of 45 0 to 49 9 in adult [facility]) 12/19/2017    Endometrial cancer ([facility] [address] ) 11/29/2017    Dyslipidemia 04/01/2014    Major depression, chronic 10/07/2013     She  has a past surgical history that includes Abdominal surgery; Colonoscopy; pr lap, radical hyst w/ tube&ov, node bx (N/A, 12/19/2017); pr lap,diagnostic abdomen (N/A, 12/19/2017); Cholecystectomy; Gastric bypass; Tonsillectomy; Hysterectomy (Bilateral); Oophorectomy (Bilateral); US guided breast biopsy right complete (Right, 6/28/2019); CT needle biopsy lymph node (7/8/2019); IR port Placement (7/26/2019); IR tube placement nephrostomy (7/26/2019); IR port Removal (9/20/2019); IR PICC line (9/27/2019); pr insj tunneled ctr vad w/subq port age 13 yr/> (Left, 11/12/2019); and FL guided central venous access device insertion (11/12/2019)  Her family history includes Bone cancer in her maternal grandfather; Lymphoma in her father; No Known Problems in her brother, brother, maternal aunt, maternal aunt, maternal aunt, maternal aunt, maternal grandmother, paternal aunt, paternal aunt, paternal aunt, paternal aunt, paternal grandfather, and sister; Other in her mother; Ovarian cancer (age of onset: 48) in her mother; Uterine cancer in her paternal grandmother  She  reports that she has never smoked  She has never used smokeless tobacco  She reports that she drank alcohol  She reports that she does not use drugs    Current Outpatient Medications   Medication Sig Dispense Refill    acetaminophen (TYLENOL) 325 mg tablet Take 2 tablets (650 mg total) by mouth every 6 (six) hours as needed for mild pain, headaches or fever 30 tablet 0    ARIPiprazole (ABILIFY) 10 mg tablet Take 10 mg by mouth daily      aspirin (ECOTRIN LOW STRENGTH) 81 mg EC tablet Take 81 mg by mouth daily      cholecalciferol (VITAMIN D3) 1,000 units tablet Take 1,000 Units by mouth daily      enoxaparin (LOVENOX) 120 mg/0 8 mL Inject 0 8 mL (120 mg total) under the skin daily 30 Syringe 3    folic acid (FOLVITE) 1 mg tablet Take 1 tablet (1 mg total) by mouth daily  0    gemfibrozil (LOPID) 600 mg tablet TAKE 1 TABLET BY MOUTH DAILY 90 tablet 1    hydrOXYzine HCL (ATARAX) 25 mg tablet Take 1 tablet (25 mg total) by mouth every 6 (six) hours as needed for itching 30 tablet 0    MYRBETRIQ 50 MG TB24 TAKE 1 TABLET BY MOUTH EVERY DAY 90 tablet 3    omeprazole (PriLOSEC) 20 mg delayed release capsule Take 20 mg by mouth daily      ondansetron (ZOFRAN) 8 mg tablet Take 1 tablet (8 mg total) by mouth every 8 (eight) hours as needed for nausea or vomiting 30 tablet 1    quinapril (ACCUPRIL) 5 mg tablet Take 1 tablet (5 mg total) by mouth daily at bedtime 90 tablet 1    saccharomyces boulardii (FLORASTOR) 250 mg capsule Take 1 capsule (250 mg total) by mouth 2 (two) times a day  0    venlafaxine (EFFEXOR) 75 mg tablet Take 75 mg by mouth 3 (three) times a day         No current facility-administered medications for this visit        Current Outpatient Medications on File Prior to Visit   Medication Sig    acetaminophen (TYLENOL) 325 mg tablet Take 2 tablets (650 mg total) by mouth every 6 (six) hours as needed for mild pain, headaches or fever    ARIPiprazole (ABILIFY) 10 mg tablet Take 10 mg by mouth daily    aspirin (ECOTRIN LOW STRENGTH) 81 mg EC tablet Take 81 mg by mouth daily    cholecalciferol (VITAMIN D3) 1,000 units tablet Take 1,000 Units by mouth daily    enoxaparin (LOVENOX) 120 mg/0 8 mL Inject 0 8 mL (120 mg total) under the skin daily    folic acid (FOLVITE) 1 mg tablet Take 1 tablet (1 mg total) by mouth daily    gemfibrozil (LOPID) 600 mg tablet TAKE 1 TABLET BY MOUTH DAILY    hydrOXYzine HCL (ATARAX) 25 mg tablet Take 1 tablet (25 mg total) by mouth every 6 (six) hours as needed for itching    MYRBETRIQ 50 MG TB24 TAKE 1 TABLET BY MOUTH EVERY DAY    omeprazole (PriLOSEC) 20 mg delayed release capsule Take 20 mg by mouth daily    ondansetron (ZOFRAN) 8 mg tablet Take 1 tablet (8 mg total) by mouth every 8 (eight) hours as needed for nausea or vomiting    saccharomyces boulardii (FLORASTOR) 250 mg capsule Take 1 capsule (250 mg total) by mouth 2 (two) times a day    venlafaxine (EFFEXOR) 75 mg tablet Take 75 mg by mouth 3 (three) times a day       No current facility-administered medications on file prior to visit  She is allergic to cephalosporins       Review of Systems   Constitutional: Negative  HENT: Negative  Respiratory: Negative  Negative for shortness of breath  Cardiovascular: Negative  Negative for chest pain  Genitourinary: Negative  Musculoskeletal: Negative for gait problem  Neurological: Negative for light-headedness  Objective:      /82 (BP Location: Right arm, Patient Position: Sitting, Cuff Size: Standard)   Pulse 81   Temp 97 7 °F (36 5 °C) (Temporal)   Resp 16   Ht 4' 11" (1 499 m)   Wt 85 7 kg (189 lb)   LMP  (LMP Unknown)   SpO2 98%   BMI 38 17 kg/m²          Physical Exam   Constitutional: She appears well-developed and well-nourished  No distress  HENT:   Head: Normocephalic and atraumatic  Right Ear: External ear normal    Left Ear: External ear normal    Nose: Nose normal    Mouth/Throat: Oropharynx is clear and moist  No oropharyngeal exudate  Neck: Normal range of motion  Neck supple  No JVD present  No tracheal deviation present  No thyromegaly present  Cardiovascular: Normal rate, regular rhythm, normal heart sounds and intact distal pulses  Exam reveals no gallop and no friction rub  No murmur heard  Pulmonary/Chest: Effort normal and breath sounds normal  No respiratory distress  She has no wheezes  She has no rales  Lymphadenopathy:     She has no cervical adenopathy  Skin: She is not diaphoretic

## 2020-02-04 ENCOUNTER — RADIATION ONCOLOGY CONSULT (OUTPATIENT)
Dept: RADIATION ONCOLOGY | Facility: HOSPITAL | Age: 65
End: 2020-02-04
Attending: STUDENT IN AN ORGANIZED HEALTH CARE EDUCATION/TRAINING PROGRAM
Payer: COMMERCIAL

## 2020-02-04 ENCOUNTER — TRANSCRIBE ORDERS (OUTPATIENT)
Dept: RADIATION ONCOLOGY | Facility: HOSPITAL | Age: 65
End: 2020-02-04

## 2020-02-04 VITALS
HEART RATE: 88 BPM | RESPIRATION RATE: 18 BRPM | SYSTOLIC BLOOD PRESSURE: 126 MMHG | WEIGHT: 188.4 LBS | OXYGEN SATURATION: 97 % | DIASTOLIC BLOOD PRESSURE: 78 MMHG | TEMPERATURE: 97.7 F | BODY MASS INDEX: 38.05 KG/M2

## 2020-02-04 DIAGNOSIS — C54.1 ENDOMETRIAL CANCER (HCC): Primary | Chronic | ICD-10-CM

## 2020-02-04 DIAGNOSIS — C54.1 ENDOMETRIAL CANCER (HCC): Chronic | ICD-10-CM

## 2020-02-04 DIAGNOSIS — C54.1 ENDOMETRIAL SARCOMA (HCC): Primary | ICD-10-CM

## 2020-02-04 PROCEDURE — G0463 HOSPITAL OUTPT CLINIC VISIT: HCPCS | Performed by: STUDENT IN AN ORGANIZED HEALTH CARE EDUCATION/TRAINING PROGRAM

## 2020-02-04 PROCEDURE — 99211 OFF/OP EST MAY X REQ PHY/QHP: CPT | Performed by: STUDENT IN AN ORGANIZED HEALTH CARE EDUCATION/TRAINING PROGRAM

## 2020-02-04 NOTE — PROGRESS NOTES
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  Morrison genetic testing was negative  June 2019 - She developed recurrence to right pelvic sidewall  Biopsy of right pelvic lymph node on 7/8/19 confirmed high grade adenocarcinoma  This was complicated by significant neuropathic pain, DVT and hydronephrosis, obstruction of right ureter requiring right percutaneous nephrostomy tube  She is on therapeutic anticoagulation with Lovenox  Her case was discussed at [address] in July with plans for chemotherapy followed by radiation  7/30/19 - 1/6/20 - 6 cycles of chemotherapy with Carboplatin/Taxol every 21 days  1/7 - 1/12/20 Hospital admission for fever s/p blood transfusion  1/5/20 CT chest abd pelvis  IMPRESSION:   1   Right percutaneous nephroureterostomy catheter in place with mild right hydronephrosis despite catheter similar to prior examination  Correlate for catheter malfunction  2   The subcutaneous catheter tract is unremarkable without evidence of significant hematoma  3   Stable mass/lymphadenopathy along the right pelvic sidewall  1/27/20 PET/CT  IMPRESSION:  1  Hypermetabolic right pelvic sidewall mass measuring approximately 1 7 x 3 3 cm, most compatible with viable tumor  2   Mildly increased FDG uptake in the T2 vertebral body may be physiologic versus early metastasis  Further evaluation with contrast MR recommended    3   No hypermetabolic metastases in the neck, chest, or upper abdomen      1/31/20 Jenita Opitz, Dr Finas Dam follow-up  Pt has completed 6 cycles of carbo/taxol  Repeat PET/CT demonstrates minimal active disease confined to the right pelvic sidewall  Plan to consult with Radiation Oncology to consider pelvic radiation  Re: right nephrostomy, strongly advised against capping this given ongoing obstruction and prior urosepsis at time of capping  Consider anterograde study no earlier than 2-3 months after completion of radiation  3/2/20 Urology follow-up  5/6/20 Corey Joyner, Dr Fredo Wylie follow-up        Endometrial cancer [facility])    11/17/2017 Initial Diagnosis     Endometrial cancer ([facility] [address] )      11/17/2017 Biopsy     ENDOMETRIAL BIOPSY: WELL DIFFERENTIATED ENDOMETRIAL ADENOCARCINOMA (FIGO I) WITH FOCALMUCINOUS FEATURES  Part B: ENDOCERVICAL POLYP:BENIGN ENDOCERVICAL       12/19/2017 Surgery     Robotic assisted total laparoscopic hysterectomy with bilateral salpingo-oophorectomy and sentinel bilateral pelvic lymph node dissection  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 (4 4 x 3 2 cm tumor, 9 4/15 4mm invasion, NO LVSI, washings revealed atypical cellular changes)      12/19/2017 Genetic Testing     Morrison testing negative      6/28/2019 Biopsy     A  Breast, Right, US BX Right Breast 1000 4 cmfn:  - Benign breast tissue with focal histiocytic aggregate  See comment   - Negative for atypia and in-situ or invasive carcinoma  7/8/2019 Recurrence     Presented with right lower extremity DVT and CT demonstrating right pelvic sidewall mass with venous, ureteral and nerve compression causing significant neuropathic pain  Biopsy:  Lymph Node, Right pelvic lymph node x3:  - High-grade adenocarcinoma  7/30/2019 - 1/6/2020 Chemotherapy     Taxol 175 mg/m2 and carboplatin AUC 6 every 21 days  Dose was reduced to taxol 135 mg/m2 and carboplatin AUC 5  Completed 6 cycles  Treatment protracted due to multiple hospital admissions             Clinical Trial: No    OB/GYN History:  The patient underwent menarche at 10 years  Menopause Status Pre, [place], Post, Unknown and No Answer  No LMP recorded (lmp unknown)  Patient has had a hysterectomy  Menopause at 54 years    Menopause Reason natural  Hormone replacement therapy: no    0   Para 0   Birth control pills: no     Pregnancy test needed:  no      Health Maintenance   Topic Date Due    Hepatitis C Screening  1955    DXA SCAN  1955    Pneumococcal Vaccine: Pediatrics (0 to 5 Years) and At-Risk Patients (6 to 59 Years) (1 of 3 - PCV13) 1963    DTaP,Tdap,and Td Vaccines (1 - Tdap) 1968    Hepatitis B Vaccine (1 of 3 - Risk 3-dose series) 1976    Annual Physical  2020    HIV Screening  2020 (Originally 1970)    MAMMOGRAM  2020    BMI: Followup Plan  2021    BMI: Adult  2021    Pneumococcal Vaccine: 65+ Years (1 of 2 - PCV13) 2022    CRC Screening: Colonoscopy  10/14/2027    Influenza Vaccine  Completed    HIB Vaccine  Aged Out    IPV Vaccine  Aged Out    Hepatitis A Vaccine  Aged Out    Meningococcal ACWY Vaccine  Aged Out    HPV Vaccine  Aged Out       Past Medical History:   Diagnosis Date    Anemia     Chemotherapy follow-up examination     Depression     Endometrial cancer ([facility] [address] ) 2017    Hyperglycemia     vx type 2 dm -- last assessed 14; resolved 17    Hyperlipidemia     Hypertension     Obesity     last assessed 10/14/17; resolved 17       Past Surgical History:   Procedure Laterality Date    ABDOMINAL SURGERY      GASTRIC BYPASS    CHOLECYSTECTOMY      at the time of gastric bypass    COLONOSCOPY      CT NEEDLE BIOPSY LYMPH NODE  2019    FL GUIDED CENTRAL VENOUS ACCESS DEVICE INSERTION  2019    GASTRIC BYPASS      HYSTERECTOMY Bilateral     total abdominal with salpingo-oophorectomy    IR PICC LINE  2019    IR PORT PLACEMENT  2019    IR PORT REMOVAL  2019    IR TUBE PLACEMENT NEPHROSTOMY  2019    OOPHORECTOMY Bilateral     CA INSJ TUNNELED CTR VAD W/SUBQ PORT AGE 5 YR/> Left 11/12/2019    Procedure: INSERTION VENOUS PORT ( PORT-A-CATH) IR;  Surgeon: Thera Cheadle, DO;  [facility]: [facility];  Service: Interventional Radiology    CA LAP, RADICAL HYST W/ TUBE&OV, NODE BX N/A 12/19/2017    Procedure: HYSTERECTOMY LAPAROSCOPIC TOTAL ([address]) W/ ROBOTICS; BILATERAL SALPINGOOPHERECTOMY; LYMPH NODE DISSECTION; lysis of adhesions;  Surgeon: Mann Tanner MD;  Location: [facility];  Service: Gynecology Oncology    CA LAP,DIAGNOSTIC ABDOMEN N/A 12/19/2017    Procedure: LAPAROSCOPY DIAGNOSTIC;  Surgeon: Mann Tanner MD;  [facility]: [facility];  Service: Gynecology Oncology    TONSILLECTOMY      US GUIDED BREAST BIOPSY RIGHT COMPLETE Right 6/28/2019       Family History   Problem Relation Age of Onset    Other Mother         dyslipidemia    Ovarian cancer Mother 48    Lymphoma Father     Bone cancer Maternal Grandfather     No Known Problems Sister     No Known Problems Maternal Grandmother     Uterine cancer Paternal Grandmother     No Known Problems Paternal Grandfather     No Known Problems Maternal Aunt     No Known Problems Maternal Aunt     No Known Problems Maternal Aunt     No Known Problems Maternal Aunt     No Known Problems Paternal Aunt     No Known Problems Paternal Aunt     No Known Problems Paternal Aunt     No Known Problems Paternal Aunt     No Known Problems Brother     No Known Problems Brother        Social History     Tobacco Use    Smoking status: Never Smoker    Smokeless tobacco: Never Used   Substance Use Topics    Alcohol use: Not Currently    Drug use: No          Current Outpatient Medications:     acetaminophen (TYLENOL) 325 mg tablet, Take 2 tablets (650 mg total) by mouth every 6 (six) hours as needed for mild pain, headaches or fever, Disp: 30 tablet, Rfl: 0    ARIPiprazole (ABILIFY) 10 mg tablet, Take 10 mg by mouth daily, Disp: , Rfl:     aspirin (ECOTRIN LOW STRENGTH) 81 mg EC tablet, Take 81 mg by mouth daily, Disp: , Rfl:     cholecalciferol (VITAMIN D3) 1,000 units tablet, Take 1,000 Units by mouth daily, Disp: , Rfl:     enoxaparin (LOVENOX) 120 mg/0 8 mL, Inject 0 8 mL (120 mg total) under the skin daily, Disp: 30 Syringe, Rfl: 3    folic acid (FOLVITE) 1 mg tablet, Take 1 tablet (1 mg total) by mouth daily, Disp: , Rfl: 0    gemfibrozil (LOPID) 600 mg tablet, TAKE 1 TABLET BY MOUTH DAILY, Disp: 90 tablet, Rfl: 1    hydrOXYzine HCL (ATARAX) 25 mg tablet, Take 1 tablet (25 mg total) by mouth every 6 (six) hours as needed for itching, Disp: 30 tablet, Rfl: 0    MYRBETRIQ 50 MG TB24, TAKE 1 TABLET BY MOUTH EVERY DAY, Disp: 90 tablet, Rfl: 3    omeprazole (PriLOSEC) 20 mg delayed release capsule, Take 20 mg by mouth daily, Disp: , Rfl:     ondansetron (ZOFRAN) 8 mg tablet, Take 1 tablet (8 mg total) by mouth every 8 (eight) hours as needed for nausea or vomiting, Disp: 30 tablet, Rfl: 1    quinapril (ACCUPRIL) 5 mg tablet, Take 1 tablet (5 mg total) by mouth daily at bedtime, Disp: 90 tablet, Rfl: 1    saccharomyces boulardii (FLORASTOR) 250 mg capsule, Take 1 capsule (250 mg total) by mouth 2 (two) times a day, Disp: , Rfl: 0    venlafaxine (EFFEXOR) 75 mg tablet, Take 75 mg by mouth 3 (three) times a day  , Disp: , Rfl:     Allergies   Allergen Reactions    Cephalosporins Rash     Which Cephalosporin reaction was to not specified; however, has tolerated Amoxicillin, Cefazolin, and Cefepime        Review of Systems:  Review of Systems   Constitutional: Positive for fatigue and unexpected weight change (50 lbs since chemo, is gaining some weight back)  HENT: Negative  Eyes: Negative  Respiratory: Positive for cough ("tickle", coughing mostly in the morning)  Cardiovascular: Negative  Gastrointestinal: Positive for constipation (taking stool softeners prn) and nausea (with dry heaves at times, taking zofran)  Endocrine: Negative  Genitourinary: Positive for frequency  Right nephrostomy tube intact   Musculoskeletal: Negative  Skin: Negative  Allergic/Immunologic: Negative  Neurological: Negative  Hematological: Negative  Psychiatric/Behavioral: Negative  Vitals:    02/04/20 0919   BP: 126/78   BP Location: Left arm   Pulse: 88   Resp: 18   Temp: 97 7 °F (36 5 °C)   TempSrc: Temporal   SpO2: 97%   Weight: 85 5 kg (188 lb 6 4 oz)            Imaging:No images are attached to the encounter       Teaching: RT packet provided, discussed possible s/e of radiation

## 2020-02-04 NOTE — PROGRESS NOTES
Consultation - Radiation Oncology     FGU:[id number] : 1955  Encounter: [id number]  Patient Information: [facility]  Chief Complaint   Patient presents with    Consult     Radiation Oncology     Cancer Staging  Endometrial cancer [facility])  Staging form: Corpus Uteri - Carcinoma, AJCC 7th Edition  - Clinical stage from 2017: FIGO Stage IB (T1b, N0, M0) - Signed by Kevin Irene MD on 2018  Staged by: Managing physician  Histopathologic type: Endometrioid adenocarcinoma, NOS  Tumor size (mm): 44  Histologic grade (G): G1  Lymph-vascular invasion (LVI): LVI not present (absent)/not identified  Residual tumor (R): R0 - None  Pelvic edb status: Negative  Para-aortic status: Not assessed  Omentectomy performed: No  Stage used in treatment planning: Yes  National guidelines used in treatment planning: Yes           History of Present Illness   Delores Noguera is a 60y o  year old female who presents today for radiation consult for endometrial cancer  She is referred by Dr Rafaela Kramer      60year old female with a history of endometrial cancer, initially diagnosed in  with Stage IB  She underwent robotic hysterectomy with BSO and sentinel b/l pelvic lymph node dissection on 17 revealing Stage IB, 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  Morrison genetic testing was negative       2019 - She developed recurrence to right pelvic sidewall  Biopsy of right pelvic lymph node on 19 confirmed high grade adenocarcinoma  This was complicated by significant neuropathic pain, DVT and hydronephrosis, obstruction of right ureter requiring right percutaneous nephrostomy tube  She is on therapeutic anticoagulation with Lovenox  Her case was discussed at [address] [place] [address] in July with plans for chemotherapy followed by radiation       19 - 20 - 6 cycles of chemotherapy with Carboplatin/Taxol every 21 days         - 20 [facility] admission for fever s/p blood transfusion      1/5/20 CT chest abd pelvis  IMPRESSION:   1   Right percutaneous nephroureterostomy catheter in place with mild right hydronephrosis despite catheter similar to prior examination   Correlate for catheter malfunction  2   The subcutaneous catheter tract is unremarkable without evidence of significant hematoma  3   Stable mass/lymphadenopathy along the right pelvic sidewall         1/27/20 PET/CT  IMPRESSION:  1   Hypermetabolic right pelvic sidewall mass measuring approximately 1 7 x 3 3 cm, most compatible with viable tumor  2  Laura Pederson increased FDG uptake in the T2 vertebral body may be physiologic versus early metastasis   Further evaluation with contrast MR recommended  3   No hypermetabolic metastases in the neck, chest, or upper abdomen      1/31/20 Dr Andrew Harley follow-up  Pt has completed 6 cycles of carbo/taxol  Repeat PET/CT demonstrates minimal active disease confined to the right pelvic sidewall  Plan to consult with Radiation Oncology to consider pelvic radiation  Re: right nephrostomy, strongly advised against capping this given ongoing obstruction and prior urosepsis at time of capping  Consider anterograde study no earlier than 2-3 months after completion of radiation       3/2/20 Urology follow-up  5/6/20 Dr Andrew Harley follow-up         Historical Information      Endometrial cancer ([facility] [address] )    11/17/2017 Initial Diagnosis     Endometrial cancer ([facility] [address] )      11/17/2017 Biopsy     ENDOMETRIAL BIOPSY: WELL DIFFERENTIATED ENDOMETRIAL ADENOCARCINOMA (FIGO I) WITH FOCALMUCINOUS FEATURES  Part B: ENDOCERVICAL POLYP:BENIGN ENDOCERVICAL       12/19/2017 Surgery     Robotic assisted total laparoscopic hysterectomy with bilateral salpingo-oophorectomy and sentinel bilateral pelvic lymph node dissection   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 (4 4 x 3 2 cm tumor, 9 4/15 4mm invasion, NO LVSI, washings revealed atypical cellular changes)      12/19/2017 Genetic Testing     Morrison testing negative      6/28/2019 Biopsy     A  Breast, Right, US BX Right Breast 1000 4 cmfn:  - Benign breast tissue with focal histiocytic aggregate  See comment   - Negative for atypia and in-situ or invasive carcinoma  7/8/2019 Recurrence     Presented with right lower extremity DVT and CT demonstrating right pelvic sidewall mass with venous, ureteral and nerve compression causing significant neuropathic pain  Biopsy:  Lymph Node, Right pelvic lymph node x3:  - High-grade adenocarcinoma  7/30/2019 - 1/6/2020 Chemotherapy     Taxol 175 mg/m2 and carboplatin AUC 6 every 21 days  Dose was reduced to taxol 135 mg/m2 and carboplatin AUC 5  Completed 6 cycles  Treatment protracted due to multiple hospital admissions             Past Medical History:   Diagnosis Date    Anemia     Chemotherapy follow-up examination     Depression     Endometrial cancer ([facility] [address] ) 12/2017    Hyperglycemia     vx type 2 dm -- last assessed 4/1/14; resolved 11/7/17    Hyperlipidemia     Hypertension     Obesity     last assessed 10/14/17; resolved 11/7/17     Past Surgical History:   Procedure Laterality Date    ABDOMINAL SURGERY      GASTRIC BYPASS    CHOLECYSTECTOMY      at the time of gastric bypass    COLONOSCOPY      CT NEEDLE BIOPSY LYMPH NODE  7/8/2019    FL GUIDED CENTRAL VENOUS ACCESS DEVICE INSERTION  11/12/2019    GASTRIC BYPASS      HYSTERECTOMY Bilateral     total abdominal with salpingo-oophorectomy    IR PICC LINE  9/27/2019    IR PORT PLACEMENT  7/26/2019    IR PORT REMOVAL  9/20/2019    IR TUBE PLACEMENT NEPHROSTOMY  7/26/2019    OOPHORECTOMY Bilateral     NJ INSJ TUNNELED CTR VAD W/SUBQ PORT AGE 5 YR/> Left 11/12/2019    Procedure: INSERTION VENOUS PORT ( PORT-A-CATH) IR;  Surgeon: Matthew Nagel DO;  [facility];  Service: Interventional Radiology    NJ LAP, RADICAL HYST W/ TUBE&OV, NODE BX N/A 12/19/2017 Procedure: HYSTERECTOMY LAPAROSCOPIC TOTAL ([address]) W/ ROBOTICS; BILATERAL SALPINGOOPHERECTOMY; LYMPH NODE DISSECTION; lysis of adhesions;  Surgeon: Alcon Gaffney MD;  [facility];  Service: Gynecology Oncology    FL LAP,DIAGNOSTIC ABDOMEN N/A 12/19/2017    Procedure: LAPAROSCOPY DIAGNOSTIC;  Surgeon: Alcon Gaffney MD;  [facility];  Service: Gynecology Oncology    TONSILLECTOMY      US GUIDED BREAST BIOPSY RIGHT COMPLETE Right 6/28/2019       Family History   Problem Relation Age of Onset    Other Mother         dyslipidemia    Ovarian cancer Mother 48    Lymphoma Father     Bone cancer Maternal Grandfather     No Known Problems Sister     No Known Problems Maternal Grandmother     Uterine cancer Paternal Grandmother     No Known Problems Paternal Grandfather     No Known Problems Maternal Aunt     No Known Problems Maternal Aunt     No Known Problems Maternal Aunt     No Known Problems Maternal Aunt     No Known Problems Paternal Aunt     No Known Problems Paternal Aunt     No Known Problems Paternal Aunt     No Known Problems Paternal Aunt     No Known Problems Brother     No Known Problems Brother        Social History   Social History     Substance and Sexual Activity   Alcohol Use Not Currently     Social History     Substance and Sexual Activity   Drug Use No     Social History     Tobacco Use   Smoking Status Never Smoker   Smokeless Tobacco Never Used         Meds/Allergies     Current Outpatient Medications:     acetaminophen (TYLENOL) 325 mg tablet, Take 2 tablets (650 mg total) by mouth every 6 (six) hours as needed for mild pain, headaches or fever, Disp: 30 tablet, Rfl: 0    ARIPiprazole (ABILIFY) 10 mg tablet, Take 10 mg by mouth daily, Disp: , Rfl:     aspirin (ECOTRIN LOW STRENGTH) 81 mg EC tablet, Take 81 mg by mouth daily, Disp: , Rfl:     cholecalciferol (VITAMIN D3) 1,000 units tablet, Take 1,000 Units by mouth daily, Disp: , Rfl:     enoxaparin (LOVENOX) 120 mg/0 8 mL, Inject 0 8 mL (120 mg total) under the skin daily, Disp: 30 Syringe, Rfl: 3    folic acid (FOLVITE) 1 mg tablet, Take 1 tablet (1 mg total) by mouth daily, Disp: , Rfl: 0    gemfibrozil (LOPID) 600 mg tablet, TAKE 1 TABLET BY MOUTH DAILY, Disp: 90 tablet, Rfl: 1    hydrOXYzine HCL (ATARAX) 25 mg tablet, Take 1 tablet (25 mg total) by mouth every 6 (six) hours as needed for itching, Disp: 30 tablet, Rfl: 0    MYRBETRIQ 50 MG TB24, TAKE 1 TABLET BY MOUTH EVERY DAY, Disp: 90 tablet, Rfl: 3    omeprazole (PriLOSEC) 20 mg delayed release capsule, Take 20 mg by mouth daily, Disp: , Rfl:     ondansetron (ZOFRAN) 8 mg tablet, Take 1 tablet (8 mg total) by mouth every 8 (eight) hours as needed for nausea or vomiting, Disp: 30 tablet, Rfl: 1    quinapril (ACCUPRIL) 5 mg tablet, Take 1 tablet (5 mg total) by mouth daily at bedtime, Disp: 90 tablet, Rfl: 1    saccharomyces boulardii (FLORASTOR) 250 mg capsule, Take 1 capsule (250 mg total) by mouth 2 (two) times a day, Disp: , Rfl: 0    venlafaxine (EFFEXOR) 75 mg tablet, Take 75 mg by mouth 3 (three) times a day  , Disp: , Rfl:   Allergies   Allergen Reactions    Cephalosporins Rash     Which Cephalosporin reaction was to not specified; however, has tolerated Amoxicillin, Cefazolin, and Cefepime     Clinical Trial: No     OB/GYN History:  The patient underwent menarche at 10 years  Menopause Status Pre, [place], Post, Unknown and No Answer  No LMP recorded (lmp unknown)  Patient has had a hysterectomy  Menopause at 54 years  Menopause Reason natural  Hormone replacement therapy: no    0   Para 0   Birth control pills: no     Pregnancy test needed:  no    Review of Systems Constitutional: Positive for fatigue and unexpected weight change (50 lbs since chemo, is gaining some weight back)  HENT: Negative  Eyes: Negative  Respiratory: Positive for cough ("tickle", coughing mostly in the morning)  Cardiovascular: Negative  Gastrointestinal: Positive for constipation (taking stool softeners prn) and nausea (with dry heaves at times, taking zofran)  Endocrine: Negative  Genitourinary: Positive for frequency  Right nephrostomy tube intact   Musculoskeletal: Negative  Skin: Negative  Allergic/Immunologic: Negative  Neurological: Negative  Hematological: Negative  Psychiatric/Behavioral: Negative  OBJECTIVE:   /78 (BP Location: Left arm)   Pulse 88   Temp 97 7 °F (36 5 °C) (Temporal)   Resp 18   Wt 85 5 kg (188 lb 6 4 oz)   LMP  (LMP Unknown)   SpO2 97%   BMI 38 05 kg/m²   Pain Assessment:  2  Performance Status: ECOG/Zubrod/WHO: 1 - Symptomatic but completely ambulatory    Physical Exam GENERAL:  Appears stated age, in no apparent distress  Alert and oriented  HEENT:  Normocephalic, atraumatic   extraocular muscles intact  Oral mucosa moist   PULMONARY:  Respirations unlabored  CARDIOVASCULAR:  Regular rate  ABDOMEN:  Soft, nondistended, nephrostomy tube in place  NEUROLOGIC: Moving all extremities, No focal deficits noted  EXTREMITIES: no clubbing, cyanosis, or edema  PSYCHIATRIC: normal mood and affect  Appropriate thought content and judgement            RESULTS  Lab Results    Chemistry        Component Value Date/Time     10/27/2017 0709    K 4 3 01/30/2020 1011    K 4 2 11/01/2018 0744     01/30/2020 1011     11/01/2018 0744    CO2 24 01/30/2020 1011    CO2 24 11/01/2018 0744    BUN 26 (H) 01/30/2020 1011    BUN 12 11/01/2018 0744    CREATININE 0 81 01/30/2020 1011    CREATININE 0 68 10/27/2017 0709        Component Value Date/Time    CALCIUM 9 6 01/30/2020 1011    CALCIUM 9 4 10/27/2017 0709    ALKPHOS 117 (H) 01/30/2020 1011    ALKPHOS 104 10/27/2017 0709    AST 8 01/30/2020 1011    AST 12 11/01/2018 0744    ALT 17 01/30/2020 1011    ALT 14 11/01/2018 0744    BILITOT 0 3 10/27/2017 0709            Lab Results   Component Value Date    WBC 5 31 01/30/2020    HGB 7 6 (L) 01/30/2020    HCT 24 7 (L) 01/30/2020    MCV 97 01/30/2020     01/30/2020         Imaging Studies  Xr Chest 2 Views    Result Date: 1/8/2020  Narrative: CHEST INDICATION:   fever eval for pna and TRALI  COMPARISON:  10/22/2019  EXAM PERFORMED/VIEWS:  XR CHEST PA & LATERAL FINDINGS:  Left internal jugular chest port is in satisfactory position  Cardiomediastinal silhouette appears unremarkable  The lungs are clear  No pneumothorax or pleural effusion  Osseous structures appear within normal limits for patient age  Impression: No acute cardiopulmonary disease  Workstation performed: [id number]     Nm Pet Ct Skull Base To Mid Thigh    Result Date: 1/27/2020  Narrative: PET/CT SCAN INDICATION:  C54 1: Malignant neoplasm of endometrium   , restaging postchemotherapy for treatment management, possible radiotherapy, status post hysterectomy, BSO MODIFIER: PS COMPARISON: CT 1/5/2020 and priors CELL TYPE:  Endometrial adenocarcinoma, endometrial biopsy 11/17/2017; right pelvic node biopsy 7/8/2019, high-grade adenocarcinoma TECHNIQUE:   10 1 mCi F-18-FDG administered IV  Multiplanar attenuation corrected and non attenuation corrected PET images were acquired 60 minutes post injection  Contiguous, low dose, axial CT sections were obtained from the skull base through the femurs   Intravenous contrast material was not utilized  This examination, like all CT scans performed in the [facility], was performed utilizing techniques to minimize radiation dose exposure, including the use of iterative reconstruction and automated exposure control  Fasting serum glucose: 97 mg/dl FINDINGS: VISUALIZED BRAIN:   No acute abnormalities are seen  HEAD/NECK:   There is a physiologic distribution of FDG  No FDG avid cervical adenopathy is seen  CT images: Unremarkable  CHEST: Increased activity in a probable lipomatous hypertrophy of the interatrial septum, SUV 5 6   No additional FDG avid soft tissue lesions are seen  No hypermetabolic hilar or mediastinal adenopathy  CT images: Stable  ABDOMEN:   No FDG avid soft tissue lesions are seen  CT images: Right nephroureterostomy 2  No hydronephrosis  Right renal cyst   Cholecystectomy  Status post gastric bypass  PELVIS: There is a right pelvic sidewall hypermetabolic mass, between the internal and external iliac vessels, and lateral to the course of the right ureteral stent, SUV 10, compatible with viable tumor  This mass measures approximately 1 7 x 3 3 cm based on the extent of FDG activity  This is smaller than on the prior CT of 7/7/2019, previously reported to measure 5 5 x 3 3 x 5 4 cm  Small subcutaneous hypermetabolic infiltration lateral to the left hip, SUV 3 5, likely inflammatory  No additional hypermetabolic lesions in the pelvis  CT images: Hysterectomy  Probable subcutaneous injections anterior abdominal wall  OSSEOUS STRUCTURES: Mildly increased activity in the T2 vertebral body, SUV 3 8, though uncertain if this is just physiologic versus possible early metastasis  Adjacent vertebral bodies have an SUV of 3 2  No additional worrisome hypermetabolic osseous lesions  CT images: No significant findings  Impression: 1  Hypermetabolic right pelvic sidewall mass measuring approximately 1 7 x 3 3 cm, most compatible with viable tumor  2   Mildly increased FDG uptake in the T2 vertebral body may be physiologic versus early metastasis  Further evaluation with contrast MR recommended  3   No hypermetabolic metastases in the neck, chest, or upper abdomen  Workstation performed: [id number]     Ct Abdomen Pelvis With Contrast    Result Date: 1/6/2020  Narrative: CT ABDOMEN AND PELVIS WITH IV CONTRAST INDICATION:   Abdominal pain, acute, nonlocalized  Bleeding around nephrostomy  COMPARISON:  CT of the chest abdomen pelvis on December 22, 2019  TECHNIQUE:  CT examination of the abdomen and pelvis was performed   Axial, sagittal, and coronal 2D reformatted images were created from the source data and submitted for interpretation  Radiation dose length product (DLP) for this visit:  511 74 mGy-cm   This examination, like all CT scans performed in the [facility], was performed utilizing techniques to minimize radiation dose exposure, including the use of iterative  reconstruction and automated exposure control  IV Contrast:  100 mL of iodixanol (VISIPAQUE)  was administered intravenously without immediate adverse reaction  Enteric Contrast:  Enteric contrast was not administered  FINDINGS: ABDOMEN LOWER CHEST:  No clinically significant abnormality identified in the visualized lower chest  LIVER/BILIARY TREE:  Unremarkable  GALLBLADDER:  Gallbladder is surgically absent  SPLEEN:  Unremarkable  PANCREAS:  Unremarkable  ADRENAL GLANDS:  Unremarkable  KIDNEYS/URETERS:  There is a right percutaneous nephroureterostomy catheter in place with the pigtail in the right renal collecting system and terminating within the bladder  The subcutaneous catheter tract is unremarkable  There is mild right hydronephrosis similar to prior exam   There is a simple exophytic cyst of the right kidney  Left kidney is unremarkable  No left hydronephrosis  STOMACH AND BOWEL:  Small hiatal hernia  Status post gastric bypass  No bowel obstruction  Left upper quadrant anastomoses appear unremarkable  APPENDIX:  No findings to suggest appendicitis  ABDOMINOPELVIC CAVITY:  Redemonstrated ill-defined mass along the right pelvic sidewall without significant change encasing and splaying the right external and internal iliac arteries, and the right ureter  No significant ascites  No free air  VESSELS:  Unremarkable for patient's age  PELVIS REPRODUCTIVE ORGANS:  Patient is status post hysterectomy  URINARY BLADDER:  Unremarkable  ABDOMINAL WALL/INGUINAL REGIONS:  Subcutaneous nodules are seen along the intra-abdominal wall likely due to injections   OSSEOUS STRUCTURES:  No acute fracture or destructive osseous lesion  Impression: 1  Right percutaneous nephroureterostomy catheter in place with mild right hydronephrosis despite catheter similar to prior examination  Correlate for catheter malfunction  2   The subcutaneous catheter tract is unremarkable without evidence of significant hematoma  3   Stable mass/lymphadenopathy along the right pelvic sidewall  Workstation performed: [id number]         Pathology: adenocarcinoma        ASSESSMENT  1  Endometrial cancer [facility])  Ambulatory referral to Radiation Oncology     Cancer Staging  Endometrial cancer [facility])  Staging form: Corpus Uteri - Carcinoma, AJCC 7th Edition  - Clinical stage from 12/19/2017: FIGO Stage IB (T1b, N0, M0) - Signed by Sammy Acosta MD on 2/12/2018  Staged by: Managing physician  Histopathologic type: Endometrioid adenocarcinoma, NOS  Tumor size (mm): 44  Histologic grade (G): G1  Lymph-vascular invasion (LVI): LVI not present (absent)/not identified  Residual tumor (R): R0 - None  Pelvic deb status: Negative  Para-aortic status: Not assessed  Omentectomy performed: No  Stage used in treatment planning: Yes  National guidelines used in treatment planning: Yes        PLAN/DISCUSSION  No orders of the defined types were placed in this encounter  Ayanna Vincent is a 60y o  year old female with history of stage !b, Grade 1 endometrial adenocarcinoma status post resection 12/19/17, now with recurrent right pelvic adenocarcinoma, status post 6 cycles of chemotherapy completed 1/6/20  Patient's restaging PET/CT scan shows continued metabolic uptake with SUV 10, compatible with viable tumor, however smaller now measuring 1 7 cm x 3 3 cm, previously 5 5 cm x 5 4 cm  Mild uptake also seen in T2, for which MRI was recommended by radiology however patient is declining due to claustrophobia stating she does not do well even with medication    As activity is low, and no CT correlate is negative, we will presume it is physiologic  We reviewed recommendations for external beam radiation to the whole pelvis followed by boost to gross disease  We reviewed initial fields will be over 5 weeks, followed by cone down, with intent to reduce risk of progression in the pelvis  We reviewed logistics of treatment including CT simulation, and side effects including but not limited to fatigue, pelvic cramping, diarrhea, dysuria, urinary frequency, and secondary malignancy  PLAN:  Informed consent obtained with plan for patient to return for CT simulation  Plan for 45Gy in 25 fractions to whole pelvis followed by cone down, to be determined near completion of pelvic fields  Patient and her sister who accompanies her were in good understanding of these recommendations and all of their questions were answered to their apparent satisfaction  Thank you for allowing us to participate in her care      Minda Kent MD  2/4/2020,2:51 PM      Portions of the record may have been created with voice recognition software   Occasional wrong word or "sound a like" substitutions may have occurred due to the inherent limitations of voice recognition software   Read the chart carefully and recognize, using context, where substitutions have occurred

## 2020-02-05 DIAGNOSIS — D64.9 ANEMIA, UNSPECIFIED TYPE: Primary | ICD-10-CM

## 2020-02-07 ENCOUNTER — HOSPITAL ENCOUNTER (OUTPATIENT)
Dept: INFUSION CENTER | Facility: CLINIC | Age: 65
Discharge: HOME | End: 2020-02-07
Payer: COMMERCIAL

## 2020-02-07 DIAGNOSIS — D64.9 ANEMIA, UNSPECIFIED TYPE: ICD-10-CM

## 2020-02-07 DIAGNOSIS — I10 BENIGN ESSENTIAL HYPERTENSION: ICD-10-CM

## 2020-02-07 DIAGNOSIS — C54.1 ENDOMETRIAL CANCER (HCC): Primary | Chronic | ICD-10-CM

## 2020-02-07 DIAGNOSIS — Z45.2 ENCOUNTER FOR CENTRAL LINE CARE: ICD-10-CM

## 2020-02-07 LAB
ALBUMIN SERPL BCP-MCNC: 3.6 G/DL (ref 3.5–5)
ALP SERPL-CCNC: 125 U/L (ref 46–116)
ALT SERPL W P-5'-P-CCNC: 18 U/L (ref 12–78)
ANION GAP SERPL CALCULATED.3IONS-SCNC: 11 MMOL/L (ref 4–13)
AST SERPL W P-5'-P-CCNC: 9 U/L (ref 5–45)
BASOPHILS # BLD AUTO: 0.02 THOUSANDS/ΜL (ref 0–0.1)
BASOPHILS NFR BLD AUTO: 0 % (ref 0–1)
BILIRUB SERPL-MCNC: 0.29 MG/DL (ref 0.2–1)
BUN SERPL-MCNC: 27 MG/DL (ref 5–25)
CALCIUM SERPL-MCNC: 9.5 MG/DL (ref 8.3–10.1)
CHLORIDE SERPL-SCNC: 105 MMOL/L (ref 100–108)
CO2 SERPL-SCNC: 25 MMOL/L (ref 21–32)
CREAT SERPL-MCNC: 0.91 MG/DL (ref 0.6–1.3)
EOSINOPHIL # BLD AUTO: 0.09 THOUSAND/ΜL (ref 0–0.61)
EOSINOPHIL NFR BLD AUTO: 2 % (ref 0–6)
ERYTHROCYTE [DISTWIDTH] IN BLOOD BY AUTOMATED COUNT: 16 % (ref 11.6–15.1)
GFR SERPL CREATININE-BSD FRML MDRD: 68 ML/MIN/1.73SQ M
GLUCOSE SERPL-MCNC: 85 MG/DL (ref 65–140)
HCT VFR BLD AUTO: 32.9 % (ref 34.8–46.1)
HGB BLD-MCNC: 10.7 G/DL (ref 11.5–15.4)
IMM GRANULOCYTES # BLD AUTO: 0.02 THOUSAND/UL (ref 0–0.2)
IMM GRANULOCYTES NFR BLD AUTO: 0 % (ref 0–2)
LYMPHOCYTES # BLD AUTO: 1.39 THOUSANDS/ΜL (ref 0.6–4.47)
LYMPHOCYTES NFR BLD AUTO: 27 % (ref 14–44)
MAGNESIUM SERPL-MCNC: 1.8 MG/DL (ref 1.6–2.6)
MCH RBC QN AUTO: 31 PG (ref 26.8–34.3)
MCHC RBC AUTO-ENTMCNC: 32.5 G/DL (ref 31.4–37.4)
MCV RBC AUTO: 95 FL (ref 82–98)
MONOCYTES # BLD AUTO: 0.59 THOUSAND/ΜL (ref 0.17–1.22)
MONOCYTES NFR BLD AUTO: 12 % (ref 4–12)
NEUTROPHILS # BLD AUTO: 2.99 THOUSANDS/ΜL (ref 1.85–7.62)
NEUTS SEG NFR BLD AUTO: 59 % (ref 43–75)
NRBC BLD AUTO-RTO: 0 /100 WBCS
PLATELET # BLD AUTO: 314 THOUSANDS/UL (ref 149–390)
PMV BLD AUTO: 9.3 FL (ref 8.9–12.7)
POTASSIUM SERPL-SCNC: 4.5 MMOL/L (ref 3.5–5.3)
PROT SERPL-MCNC: 8 G/DL (ref 6.4–8.2)
RBC # BLD AUTO: 3.45 MILLION/UL (ref 3.81–5.12)
SODIUM SERPL-SCNC: 141 MMOL/L (ref 136–145)
WBC # BLD AUTO: 5.1 THOUSAND/UL (ref 4.31–10.16)

## 2020-02-07 PROCEDURE — 80053 COMPREHEN METABOLIC PANEL: CPT

## 2020-02-07 PROCEDURE — 83735 ASSAY OF MAGNESIUM: CPT

## 2020-02-07 PROCEDURE — 85025 COMPLETE CBC W/AUTO DIFF WBC: CPT

## 2020-02-07 RX ORDER — QUINAPRIL 5 1/1
TABLET ORAL
Qty: 90 TABLET | Refills: 1 | Status: SHIPPED | OUTPATIENT
Start: 2020-02-07 | End: 2020-07-27

## 2020-02-07 NOTE — PROGRESS NOTES
Patient presents today for labs  Patient offers no complaints  Port accessed without incident with excellent blood return noted  Labs drawn as ordered  Port de-accessed and flushed as per protocol  Patient's next appointment confirmed  AVS offered and declined

## 2020-02-13 ENCOUNTER — APPOINTMENT (OUTPATIENT)
Dept: RADIATION ONCOLOGY | Facility: HOSPITAL | Age: 65
End: 2020-02-13
Attending: STUDENT IN AN ORGANIZED HEALTH CARE EDUCATION/TRAINING PROGRAM
Payer: COMMERCIAL

## 2020-02-13 ENCOUNTER — HOSPITAL ENCOUNTER (OUTPATIENT)
Dept: RADIOLOGY | Facility: HOSPITAL | Age: 65
Setting detail: RADIATION/ONCOLOGY SERIES
Discharge: HOME | End: 2020-02-13
Attending: STUDENT IN AN ORGANIZED HEALTH CARE EDUCATION/TRAINING PROGRAM
Payer: COMMERCIAL

## 2020-02-13 DIAGNOSIS — C54.1 ENDOMETRIAL SARCOMA (HCC): ICD-10-CM

## 2020-02-13 PROCEDURE — 77334 RADIATION TREATMENT AID(S): CPT | Performed by: STUDENT IN AN ORGANIZED HEALTH CARE EDUCATION/TRAINING PROGRAM

## 2020-02-13 PROCEDURE — 77014 HB CT SCAN FOR THERAPY GUIDE: CPT

## 2020-02-17 ENCOUNTER — TELEPHONE (OUTPATIENT)
Dept: UROLOGY | Facility: MEDICAL CENTER | Age: 65
End: 2020-02-17

## 2020-02-17 NOTE — TELEPHONE ENCOUNTER
Patient of Dr Breann Jacinto seen in [place] office  Patient states she is having a problem with her nephrostomy tube  Patient states she is having pain and it is leaking  Patient also states urine bag is full of something white  Please advise

## 2020-02-17 NOTE — TELEPHONE ENCOUNTER
Called patient to get some more info   Patient stated that she is having a lot of pain around tube , also she is stating that there is a white filmy discharge inside the bag   Patient statesThis is new considering she has had a few infections  She is having no fever or chills  Just a very bad odor  Rounting patient to NP for advise on next step   Please advice for next step

## 2020-02-17 NOTE — TELEPHONE ENCOUNTER
Is patient having any additional symptoms of infection? Fever, chills, hematuria, etc? How does nephrostomy insertion site appear? Description of urine appearance likely secondary to sediment, encourage patient to increase water intake

## 2020-02-17 NOTE — TELEPHONE ENCOUNTER
Called Tonya back and she doesn't have pain so much it just comes and goes  Explained that that could be from her pulling or laying on it wrong  Explained that the urine appearance could be from sediment and she should drink more water  She understands   Luiza Coronel  She will call back if the pain comes back

## 2020-02-20 ENCOUNTER — NUTRITION (OUTPATIENT)
Dept: NUTRITION | Facility: CLINIC | Age: 65
End: 2020-02-20

## 2020-02-20 DIAGNOSIS — Z71.3 NUTRITIONAL COUNSELING: Primary | ICD-10-CM

## 2020-02-20 NOTE — PROGRESS NOTES
Outpatient Oncology Nutrition Consult  Type of Consult: Follow Up  Care Location: Telephone Call   Nutrition Assessment:  PMH: anemia, depression, DM, HLD, HTN, obesity, RNYGB (2001 at [facility])  11/2719: Pt reports that on Monday her nephrostomy bag was capped and she can now urinate normally  Oncology Diagnosis & Treatments: Endometrial cancer  S/p surgery 12/19/17  Recurrence 7/8/19  S/p chemotherapy (carboplatin/taxol from 7/30/19-1/6/20)  Has a [facility] new start appointment on 2/21/20 at [facility] for whole pelvis RT, says she is starting RT 2/24/20  Endometrial cancer ([facility] [address] )    11/17/2017 Initial Diagnosis     Endometrial cancer ([facility] [address] )      11/17/2017 Biopsy     ENDOMETRIAL BIOPSY: WELL DIFFERENTIATED ENDOMETRIAL ADENOCARCINOMA (FIGO I) WITH FOCALMUCINOUS FEATURES  Part B: ENDOCERVICAL POLYP:BENIGN ENDOCERVICAL       12/19/2017 Surgery     Robotic assisted total laparoscopic hysterectomy with bilateral salpingo-oophorectomy and sentinel bilateral pelvic lymph node dissection  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 (4 4 x 3 2 cm tumor, 9 4/15 4mm invasion, NO LVSI, washings revealed atypical cellular changes)      12/19/2017 Genetic Testing     Morrison testing negative      6/28/2019 Biopsy     A  Breast, Right, US BX Right Breast 1000 4 cmfn:  - Benign breast tissue with focal histiocytic aggregate  See comment   - Negative for atypia and in-situ or invasive carcinoma  7/8/2019 Recurrence     Presented with right lower extremity DVT and CT demonstrating right pelvic sidewall mass with venous, ureteral and nerve compression causing significant neuropathic pain  Biopsy:  Lymph Node, Right pelvic lymph node x3:  - High-grade adenocarcinoma  7/30/2019 - 1/6/2020 Chemotherapy     Taxol 175 mg/m2 and carboplatin AUC 6 every 21 days  Dose was reduced to taxol 135 mg/m2 and carboplatin AUC 5  Completed 6 cycles  Treatment protracted due to multiple hospital admissions  Past Medical History:   Diagnosis Date    Anemia     Chemotherapy follow-up examination     Depression     Endometrial cancer ([facility] [address] ) 12/2017    Hyperglycemia     vx type 2 dm -- last assessed 4/1/14; resolved 11/7/17    Hyperlipidemia     Hypertension     Obesity     last assessed 10/14/17; resolved 11/7/17     Past Surgical History:   Procedure Laterality Date    ABDOMINAL SURGERY      GASTRIC BYPASS    CHOLECYSTECTOMY      at the time of gastric bypass    COLONOSCOPY      CT NEEDLE BIOPSY LYMPH NODE  7/8/2019    FL GUIDED CENTRAL VENOUS ACCESS DEVICE INSERTION  11/12/2019    GASTRIC BYPASS      HYSTERECTOMY Bilateral     total abdominal with salpingo-oophorectomy    IR PICC LINE  9/27/2019    IR PORT PLACEMENT  7/26/2019    IR PORT REMOVAL  9/20/2019    IR TUBE PLACEMENT NEPHROSTOMY  7/26/2019    OOPHORECTOMY Bilateral     RI INSJ TUNNELED CTR VAD W/SUBQ PORT AGE 5 YR/> Left 11/12/2019    Procedure: INSERTION VENOUS PORT ( PORT-A-CATH) IR;  Surgeon: Juan Bautista DO;  [facility];  Service: Interventional Radiology    RI LAP, RADICAL HYST W/ TUBE&OV, NODE BX N/A 12/19/2017    Procedure: HYSTERECTOMY LAPAROSCOPIC TOTAL ([address]) W/ ROBOTICS; BILATERAL SALPINGOOPHERECTOMY; LYMPH NODE DISSECTION; lysis of adhesions;  Surgeon: Kevin Irene MD;  Location: [facility];  Service: Gynecology Oncology    RI LAP,DIAGNOSTIC ABDOMEN N/A 12/19/2017    Procedure: LAPAROSCOPY DIAGNOSTIC;  Surgeon: Kevin Irene MD;  [facility];  Service: Gynecology Oncology    TONSILLECTOMY      US GUIDED BREAST BIOPSY RIGHT COMPLETE Right 6/28/2019       Review of Medications:   Vitamins, Supplements and Herbals: yes: Vitamin D, Folic Acid, Align, calcium citrate TID, B12 1000 mcg QD, MVI BID (MVI does not have iron);  Iron 65 mg QD (2 hrs seperate from calcium) - Hx RNYGB    Current Outpatient Medications:     acetaminophen (TYLENOL) 325 mg tablet, Take 2 tablets (650 mg total) by mouth every 6 (six) hours as needed for mild pain, headaches or fever, Disp: 30 tablet, Rfl: 0    ARIPiprazole (ABILIFY) 10 mg tablet, Take 10 mg by mouth daily, Disp: , Rfl:     aspirin (ECOTRIN LOW STRENGTH) 81 mg EC tablet, Take 81 mg by mouth daily, Disp: , Rfl:     cholecalciferol (VITAMIN D3) 1,000 units tablet, Take 1,000 Units by mouth daily, Disp: , Rfl:     enoxaparin (LOVENOX) 120 mg/0 8 mL, Inject 0 8 mL (120 mg total) under the skin daily, Disp: 30 Syringe, Rfl: 3    folic acid (FOLVITE) 1 mg tablet, Take 1 tablet (1 mg total) by mouth daily, Disp: , Rfl: 0    gemfibrozil (LOPID) 600 mg tablet, TAKE 1 TABLET BY MOUTH DAILY, Disp: 90 tablet, Rfl: 1    hydrOXYzine HCL (ATARAX) 25 mg tablet, Take 1 tablet (25 mg total) by mouth every 6 (six) hours as needed for itching, Disp: 30 tablet, Rfl: 0    MYRBETRIQ 50 MG TB24, TAKE 1 TABLET BY MOUTH EVERY DAY, Disp: 90 tablet, Rfl: 3    omeprazole (PriLOSEC) 20 mg delayed release capsule, Take 20 mg by mouth daily, Disp: , Rfl:     ondansetron (ZOFRAN) 8 mg tablet, Take 1 tablet (8 mg total) by mouth every 8 (eight) hours as needed for nausea or vomiting, Disp: 30 tablet, Rfl: 1    quinapril (ACCUPRIL) 5 mg tablet, TAKE 1 TABLET BY MOUTH EVERYDAY AT BEDTIME, Disp: 90 tablet, Rfl: 1    saccharomyces boulardii (FLORASTOR) 250 mg capsule, Take 1 capsule (250 mg total) by mouth 2 (two) times a day, Disp: , Rfl: 0    venlafaxine (EFFEXOR) 75 mg tablet, Take 75 mg by mouth 3 (three) times a day  , Disp: , Rfl:     Most Recent Lab Results: Does not check BG    Lab Results   Component Value Date    WBC 5 10 02/07/2020    IRON 24 (L) 10/02/2019    TIBC 138 (L) 10/02/2019    FERRITIN 1,042 (H) 10/02/2019    CHOLESTEROL 193 11/01/2018    CHOL 183 10/27/2017    TRIG 88 11/01/2018    HDL 59 11/01/2018    LDLCALC 106 (H) 10/27/2017    ALT 18 02/07/2020    AST 9 02/07/2020    ALB 3 6 02/07/2020     10/27/2017     05/12/2017    SODIUM 141 02/07/2020    SODIUM 140 01/30/2020    K 4 5 02/07/2020    K 4 3 01/30/2020     02/07/2020    BUN 27 (H) 02/07/2020    BUN 26 (H) 01/30/2020    CREATININE 0 91 02/07/2020    CREATININE 0 81 01/30/2020    EGFR 68 02/07/2020    PHOS 3 5 12/25/2019    PHOS 3 2 07/08/2019    GLUCOSE 219 (H) 12/19/2017    POCGLU 97 01/27/2020    GLUF 85 01/17/2020    GLUF 133 (H) 01/08/2020    GLUC 85 02/07/2020    HGBA1C 5 1 02/03/2020    HGBA1C 6 2 08/28/2019    HGBA1C 5 5 11/01/2018    CALCIUM 9 5 02/07/2020    FOLATE 5 4 10/06/2019    MG 1 8 02/07/2020     Anthropometric Measurements:   Height: 59"  Ht Readings from Last 1 Encounters:   02/03/20 4' 11" (1 499 m)     -Weight History:   Usual Weight: 270# before RNYGB in 2001; lowest post-op wt: 200#; wt typically fluctuates between 215-230#  Ideal Body Weight: 95#  Wt Readings from Last 20 Encounters:   02/04/20 85 5 kg (188 lb 6 4 oz)   02/03/20 85 7 kg (189 lb)   01/31/20 83 9 kg (185 lb)   01/24/20 83 9 kg (185 lb)   01/08/20 83 9 kg (185 lb)   01/06/20 84 4 kg (186 lb)   01/05/20 84 4 kg (186 lb 1 1 oz)   01/03/20 84 4 kg (186 lb)   01/02/20 84 4 kg (186 lb)   12/24/19 82 6 kg (182 lb 1 6 oz)   12/19/19 81 9 kg (180 lb 8 oz)   12/02/19 88 7 kg (195 lb 8 oz)   11/27/19 88 kg (193 lb 14 4 oz)   11/27/19 88 kg (194 lb)   11/25/19 87 5 kg (193 lb)   11/19/19 86 2 kg (190 lb)   11/11/19 86 2 kg (190 lb)   11/07/19 86 7 kg (191 lb 3 2 oz)   11/06/19 87 5 kg (193 lb)   10/29/19 88 8 kg (195 lb 12 8 oz)   Home Scale Wts: ~185# in the past 2 weeks but was ~189# 3-4 weeks ago, (2/19/20) 185#  Weight Changes: loss of 3 4# (1 8%) in 2 weeks (not significant),  Stable in 1 month (not significant), loss of 5# (2 6%) in 3 months (not significant), loss of 15# (7 5%) in 6 months (not significant) - wt changes include pt reported home wt from 2/19/20   -Previously stated that she had been losing 10# every month since her cancer dx in June 2019      Estimated body mass index is 38 05 kg/m² as calculated from the following:    Height as of 2/3/20: 4' 11" (1 499 m)  Weight as of 2/4/20: 85 5 kg (188 lb 6 4 oz)  Nutrition-Focused Physical Findings: n/a due to telephone call, none observed last visit     Food/Nutrition-Related History & Client/Social History:    Current Nutrition Impact Symptoms:  [] Nausea  [x] Reduced Appetite - still good but decreased in the past 1-2 weeks due to fatigue [] Acid Reflux - on omeprazole   [] Vomiting  [x] Unintended Wt Loss - ongoing since cancer dx [] Malabsorption    [] Diarrhea  [] Unintended Wt Gain  [] Dumping Syndrome   [x] Constipation - causing an "upset stomach" recently, had a "good BM today and stomach feels better"  Takes a stool softener ~1x/week prn   -Eating and drinking less recently [] Thick Mucous/Secretions  [] Abdominal Pain    [] Dysgeusia (Altered Taste) [] Xerostomia (Dry Mouth) - under control; using Biotene toothpaste + mouthwash [] Gas    [] Dysosmia (Altered Smell)  [] Thrush  [] Difficulty Chewing    [] Oral Mucositis (Sore Mouth)  [x] Fatigue - thinks this is r/t recovery from chemotherapy [] Other:    [] Odynophagia   [] Esophagitis  [] Other:    [] Dysphagia [] Early Satiety  [] No Problems Eating      Food Allergies: yes: had skin patch testing which showed allergy to strawberries, but is able to eat strawberries without any side effects  Food Intolerances: no    Current Diet: Regular Diet, No Restrictions  Current Nutrition Intake: Less than usual over the past 1-2 weeks, pt feels like this is due to fatigue from chemo  Appetite: Good but decreased in the past 1-2 weeks  Nutrition Route: PO  Meal planning/preparation mainly done by: mother does cooking; sister does shopping  Activity level: more fatigued recently, less activity  Social Hx: 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 rob with a handful of chips  Snack: ate a late dinner; vanilla Greek Yogurt with pears    Beverages: water (16 9 oz x3-4), iced tea with Splenda (16 oz x1), hot tea - decaf vanilla margie and chamomile (8 oz x2-3 per week)   -Does not drink coffee or alcohol      -Does not separate fluids from solids, does not have discomfort while eating and drinking at the same time    -Average Fluid Intake: 51-68 oz (% est needs)  Supplements: no recent [employer], suggested restarting; she agrees to drink supplement every other day   None    11/11/19 Re-Estimated Nutrition Needs: (based on 54kg/ 118 8# adjusted wt)    Energy Needs: 4890-6800 kcal/day (35-40 kcal/kg)  Protein Needs:  grams/day (1 5-2 g/kg)  Fluid Needs: 7853-6895 mL/day (1 mL/kcal) - 63-72 oz    Discussion/Summary: Jason Rodriguez was contacted today for RD follow up  She is doing well but has been more fatigued which is causing a slight decrease in her po intake  She will be starting whole pelvis RT on 2/24/20  Her weight shows stability over the past month but a loss of 1 8% over the past 2 weeks  She reports that she is nervous about the potential for diarrhea with RT, therefore, our visit focused on how to mange her nutrition if diarrhea occurs  MNT for anticipated nutrition impact sx's was discussed today in detail: fatigue and diarrhea  Supporting educational handouts for a diarrhea and a low-fiber diet were emailed to pt per her request   We will follow up in her third week of RT       Discussed/Reviewed:   · weight management  · indications & use of oral nutrition supplements  · how to modify foods for anticipated nutrition impact symptoms pt may experience during CA tx  · high protein foods to include at all meals and snacks  · ways to increase overall calorie intake  · encouraged eating every 2-3 hours (5-6 small meals/day)  · adequate hydation & tips to increase overall fluid intake  · MNT for: RNY Gastric Bypass, fatigue, wt loss, potential for diarrhea r/t whole pelvis RT  · Individualized fluid needs   · RNY Gastric Bypass lifetime vitamin/mineral supplementation  · Weigh self on home scale M-W-F mornings  All questions and concerns addressed during todays visit  Kennedi Berry has RD contact information  Nutrition Diagnosis:  · Inadequate Energy Intake related to physiological causes, disease state and treatment related issues as evidenced by food recall, wt loss and discussion with pt and/or family  · Increased Nutrient Needs (kcal & pro) related to increased demand for nutrients and disease state as evidenced by cancer dx and pt undergoing tx for cancer        Intervention & Recommendations:  Topics addressed: Nutrition education, Balance/Variety, Meals & Snacks, Meal planning, Choosing high protein meals/snacks, Meal pattern: eating small/frequent meals (every 2-3 hours), Nutrition Symptom Management, Adequate Hydration, Medical Food Supplements, Nutrition-Related Medication Management, Vitamin & Mineral Supplements and Weight Management    Barriers: None  Readiness to change: action  Comprehension: verbalizes understanding  Expected Compliance: good    Materials Provided: emailed to pt: Low Fiber Diet, Diarrhea handout      Monitoring & Evaluation:  Dietitian to Monitor: Food and Nutrition Intake, Nutrtion Impact Symptoms, Body Weight, Vitamin/Mineral Intake and Biochemical Data     Goals:  · weight maintenance/stabilization  · adequate nutrition impact symptom management  · pt to meet >/=75% estimated nutrition needs daily  · Continue lifelong vitamin/mineral supplementation for RNY Gastric Bypass    · Progress Towards Goals: Progressing     Nutrition Rx & Recommendations:  · Diet: High Calorie, High Protein (for high calorie foods see pages 52-53, and for high protein foods see pages 49-51 in your Eating Hints book)  If having Diarrhea: Low Fiber, High Calorie/Protein Diet  · Nutrition Supplements: [place] Instant Breakfast shake every other day  May use homemade shakes/smoothies as desired  If using a pre-made shake/bar, choose ones with >300 calories and >10 grams protein (ex  Ensure Enlive, Ensure Plus, Boost Plus, Boost Very High Calorie, etc )  · Small, frequent meals/snacks may be easier to tolerate than 3 large daily meals  Aim for 5-6 small meals per day (every 2-3 hours)  · Include protein at all meals/snacks  · Stay hydrated by sipping fluids of choice/tolerance throughout the day  · For constipation: drink plenty of fluids (>64 oz/day); drink hot liquids; eat high-fiber foods as tolerated (whole grains, beans, peas, nuts, seeds, fruits, vegetables, etc); increase physical activity as tolerated  Avoid increasing fiber intake too quickly, add fiber into your diet slowly; keep a record of your bowel movements (see pages 13-14 in you Eating Hints book)  · For diarrhea: drink plenty of fluids (>64 oz/day), avoid carbonation; eat 5-6 small meals/day; include high sodium & potassium foods/liquids (Sodium: soup/broth; Potassium: bananas, canned apricots, potatoes); eat low-fiber foods; choose foods/liquids that are room temperature; avoid foods/drinks that can make diarrhea worse (ex  high fiber, high sugar, hot/cold drinks, greasy/fatty foods, gas forming foods such as beans, raw produce, milk products, alcohol, spicy foods, caffeine, sugar alcohols (xylitol, sorbitol), and apple juice (high in sorbitol) (see pages 15-16 in your Eating Hints book)    If diarrhea is severe, consider adding Banatrol (banana flakes) 1-3 times per day mixed in applesauce (can be purchased online from [address])  · High protein foods to try: [place] yogurt with fruit, trail mix, oral nutrition supplements, eggs, meats, poultry, fish, cheese sticks, pudding, peanut butter, nuts, rolled up chicken/turkey breast, hummus (see pages 49-51 in your Eating Hints book)   · Weigh yourself on Monday, Wednesday, and Friday mornings  · Increase fluids to 63-72 oz per day, drink more fluid if diarrhea occurs  · Fluid options for diarrhea: warm-room temp decaf tea with honey, sugar-free sports drinks, broth, sugar-free gelatin, water, diluted juice  · Make homemade decaf iced tea  · If you experience diarrhea, follow a low-fiber diet, 5-6 small meals per day; review Diarrhea and Low Fiber Diet handouts  · Choose foods with <3 grams of fiber per serving (read food label)  · You may benefit from reducing milk products (lactose) when diarrhea is severe, you may try a low-lactose diet for a few days to see if it helps    · Continue oral probiotic and Greek yogurt  · Lifetime vitamin/mineral supplementation recommended for Gastric Bypass:  · Multivitamin BID  · Iron 45 mg daily  · Vitamin D daily  · Calcium Citrate 500 mg TID (do not take with iron, needs to be 2 hrs apart from iron)  · B12 1000 mcg daily      Follow Up Plan: 3/11/20 @ 1:30pm for a phone follow up (3rd week of RT)  Recommend Referral to Other Providers: none at this time

## 2020-02-20 NOTE — PATIENT INSTRUCTIONS
Nutrition Rx & Recommendations:  · Diet: High Calorie, High Protein (for high calorie foods see pages 52-53, and for high protein foods see pages 49-51 in your Eating Hints book)  If having Diarrhea: Low Fiber, High Calorie/Protein Diet  · Nutrition Supplements: [place] Instant Breakfast shake every other day  May use homemade shakes/smoothies as desired  If using a pre-made shake/bar, choose ones with >300 calories and >10 grams protein (ex  Ensure Enlive, Ensure Plus, Boost Plus, Boost Very High Calorie, etc )  · Small, frequent meals/snacks may be easier to tolerate than 3 large daily meals  Aim for 5-6 small meals per day (every 2-3 hours)  · Include protein at all meals/snacks  · Stay hydrated by sipping fluids of choice/tolerance throughout the day  · For constipation: drink plenty of fluids (>64 oz/day); drink hot liquids; eat high-fiber foods as tolerated (whole grains, beans, peas, nuts, seeds, fruits, vegetables, etc); increase physical activity as tolerated  Avoid increasing fiber intake too quickly, add fiber into your diet slowly; keep a record of your bowel movements (see pages 13-14 in you Eating Hints book)  · For diarrhea: drink plenty of fluids (>64 oz/day), avoid carbonation; eat 5-6 small meals/day; include high sodium & potassium foods/liquids (Sodium: soup/broth; Potassium: bananas, canned apricots, potatoes); eat low-fiber foods; choose foods/liquids that are room temperature; avoid foods/drinks that can make diarrhea worse (ex  high fiber, high sugar, hot/cold drinks, greasy/fatty foods, gas forming foods such as beans, raw produce, milk products, alcohol, spicy foods, caffeine, sugar alcohols (xylitol, sorbitol), and apple juice (high in sorbitol) (see pages 15-16 in your Eating Hints book)  If diarrhea is severe, consider adding Banatrol (banana flakes) 1-3 times per day mixed in applesauce (can be purchased online from [address])    · High protein foods to try: [place] yogurt with fruit, trail mix, oral nutrition supplements, eggs, meats, poultry, fish, cheese sticks, pudding, peanut butter, nuts, rolled up chicken/turkey breast, hummus (see pages 49-51 in your Eating Hints book)   · Weigh yourself on Monday, Wednesday, and Friday mornings  · Increase fluids to 63-72 oz per day, drink more fluid if diarrhea occurs  · Fluid options for diarrhea: warm-room temp decaf tea with honey, sugar-free sports drinks, broth, sugar-free gelatin, water, diluted juice  · Make homemade decaf iced tea  · If you experience diarrhea, follow a low-fiber diet, 5-6 small meals per day; review Diarrhea and Low Fiber Diet handouts  · Choose foods with <3 grams of fiber per serving (read food label)  · You may benefit from reducing milk products (lactose) when diarrhea is severe, you may try a low-lactose diet for a few days to see if it helps    · Continue oral probiotic and Greek yogurt  · Lifetime vitamin/mineral supplementation recommended for Gastric Bypass:  · Multivitamin BID  · Iron 45 mg daily  · Vitamin D daily  · Calcium Citrate 500 mg TID (do not take with iron, needs to be 2 hrs apart from iron)  · B12 1000 mcg daily      Follow Up Plan: 3/11/20 @ 1:30pm for a phone follow up (3rd week of RT)  Recommend Referral to Other Providers: none at this time

## 2020-02-21 PROCEDURE — 77301 RADIOTHERAPY DOSE PLAN IMRT: CPT | Performed by: STUDENT IN AN ORGANIZED HEALTH CARE EDUCATION/TRAINING PROGRAM

## 2020-02-21 PROCEDURE — 77338 DESIGN MLC DEVICE FOR IMRT: CPT | Performed by: STUDENT IN AN ORGANIZED HEALTH CARE EDUCATION/TRAINING PROGRAM

## 2020-02-21 PROCEDURE — 77300 RADIATION THERAPY DOSE PLAN: CPT | Performed by: STUDENT IN AN ORGANIZED HEALTH CARE EDUCATION/TRAINING PROGRAM

## 2020-02-24 ENCOUNTER — APPOINTMENT (OUTPATIENT)
Dept: RADIATION ONCOLOGY | Facility: HOSPITAL | Age: 65
End: 2020-02-24
Attending: STUDENT IN AN ORGANIZED HEALTH CARE EDUCATION/TRAINING PROGRAM
Payer: COMMERCIAL

## 2020-02-24 ENCOUNTER — APPOINTMENT (OUTPATIENT)
Dept: LAB | Facility: HOSPITAL | Age: 65
End: 2020-02-24
Payer: COMMERCIAL

## 2020-02-24 DIAGNOSIS — D64.9 ANEMIA, UNSPECIFIED TYPE: ICD-10-CM

## 2020-02-24 PROCEDURE — 77386 HB NTSTY MODUL RAD TX DLVR CPLX: CPT | Performed by: STUDENT IN AN ORGANIZED HEALTH CARE EDUCATION/TRAINING PROGRAM

## 2020-02-25 ENCOUNTER — APPOINTMENT (OUTPATIENT)
Dept: RADIATION ONCOLOGY | Facility: HOSPITAL | Age: 65
End: 2020-02-25
Attending: RADIOLOGY
Payer: COMMERCIAL

## 2020-02-25 ENCOUNTER — APPOINTMENT (OUTPATIENT)
Dept: RADIATION ONCOLOGY | Facility: HOSPITAL | Age: 65
End: 2020-02-25
Attending: STUDENT IN AN ORGANIZED HEALTH CARE EDUCATION/TRAINING PROGRAM
Payer: COMMERCIAL

## 2020-02-25 DIAGNOSIS — C54.1 ENDOMETRIAL CANCER (HCC): Primary | ICD-10-CM

## 2020-02-25 PROCEDURE — 77386 HB NTSTY MODUL RAD TX DLVR CPLX: CPT | Performed by: STUDENT IN AN ORGANIZED HEALTH CARE EDUCATION/TRAINING PROGRAM

## 2020-02-26 ENCOUNTER — APPOINTMENT (OUTPATIENT)
Dept: RADIATION ONCOLOGY | Facility: HOSPITAL | Age: 65
End: 2020-02-26
Attending: STUDENT IN AN ORGANIZED HEALTH CARE EDUCATION/TRAINING PROGRAM
Payer: COMMERCIAL

## 2020-02-26 PROCEDURE — 77386 HB NTSTY MODUL RAD TX DLVR CPLX: CPT | Performed by: RADIOLOGY

## 2020-02-27 ENCOUNTER — APPOINTMENT (OUTPATIENT)
Dept: RADIATION ONCOLOGY | Facility: HOSPITAL | Age: 65
End: 2020-02-27
Attending: STUDENT IN AN ORGANIZED HEALTH CARE EDUCATION/TRAINING PROGRAM
Payer: COMMERCIAL

## 2020-02-27 PROCEDURE — 77386 HB NTSTY MODUL RAD TX DLVR CPLX: CPT | Performed by: STUDENT IN AN ORGANIZED HEALTH CARE EDUCATION/TRAINING PROGRAM

## 2020-02-28 ENCOUNTER — HOSPITAL ENCOUNTER (OUTPATIENT)
Dept: RADIOLOGY | Facility: HOSPITAL | Age: 65
Discharge: HOME | End: 2020-02-28
Attending: UROLOGY | Admitting: RADIOLOGY
Payer: COMMERCIAL

## 2020-02-28 ENCOUNTER — APPOINTMENT (OUTPATIENT)
Dept: RADIATION ONCOLOGY | Facility: HOSPITAL | Age: 65
End: 2020-02-28
Attending: STUDENT IN AN ORGANIZED HEALTH CARE EDUCATION/TRAINING PROGRAM
Payer: COMMERCIAL

## 2020-02-28 DIAGNOSIS — N13.30 HYDRONEPHROSIS: ICD-10-CM

## 2020-02-28 DIAGNOSIS — N13.30 HYDRONEPHROSIS, UNSPECIFIED HYDRONEPHROSIS TYPE: ICD-10-CM

## 2020-02-28 PROCEDURE — 77336 RADIATION PHYSICS CONSULT: CPT | Performed by: STUDENT IN AN ORGANIZED HEALTH CARE EDUCATION/TRAINING PROGRAM

## 2020-02-28 PROCEDURE — 50387 CHANGE NEPHROURETERAL CATH: CPT

## 2020-02-28 PROCEDURE — 77386 HB NTSTY MODUL RAD TX DLVR CPLX: CPT | Performed by: STUDENT IN AN ORGANIZED HEALTH CARE EDUCATION/TRAINING PROGRAM

## 2020-02-28 PROCEDURE — 50387 CHANGE NEPHROURETERAL CATH: CPT | Performed by: RADIOLOGY

## 2020-02-28 RX ADMIN — IOHEXOL 15 ML: 350 INJECTION, SOLUTION INTRAVENOUS at 14:36

## 2020-03-02 ENCOUNTER — APPOINTMENT (OUTPATIENT)
Dept: RADIATION ONCOLOGY | Facility: HOSPITAL | Age: 65
End: 2020-03-02
Attending: STUDENT IN AN ORGANIZED HEALTH CARE EDUCATION/TRAINING PROGRAM
Payer: COMMERCIAL

## 2020-03-02 PROCEDURE — 77386 HB NTSTY MODUL RAD TX DLVR CPLX: CPT | Performed by: STUDENT IN AN ORGANIZED HEALTH CARE EDUCATION/TRAINING PROGRAM

## 2020-03-03 ENCOUNTER — APPOINTMENT (OUTPATIENT)
Dept: RADIATION ONCOLOGY | Facility: HOSPITAL | Age: 65
End: 2020-03-03
Attending: STUDENT IN AN ORGANIZED HEALTH CARE EDUCATION/TRAINING PROGRAM
Payer: COMMERCIAL

## 2020-03-03 PROCEDURE — 77386 HB NTSTY MODUL RAD TX DLVR CPLX: CPT | Performed by: STUDENT IN AN ORGANIZED HEALTH CARE EDUCATION/TRAINING PROGRAM

## 2020-03-04 ENCOUNTER — APPOINTMENT (OUTPATIENT)
Dept: RADIATION ONCOLOGY | Facility: HOSPITAL | Age: 65
End: 2020-03-04
Attending: STUDENT IN AN ORGANIZED HEALTH CARE EDUCATION/TRAINING PROGRAM
Payer: COMMERCIAL

## 2020-03-04 ENCOUNTER — APPOINTMENT (OUTPATIENT)
Dept: LAB | Facility: HOSPITAL | Age: 65
End: 2020-03-04
Attending: RADIOLOGY
Payer: COMMERCIAL

## 2020-03-04 LAB
BASOPHILS # BLD AUTO: 0.01 THOUSANDS/ΜL (ref 0–0.1)
BASOPHILS NFR BLD AUTO: 0 % (ref 0–1)
EOSINOPHIL # BLD AUTO: 0.01 THOUSAND/ΜL (ref 0–0.61)
EOSINOPHIL NFR BLD AUTO: 0 % (ref 0–6)
ERYTHROCYTE [DISTWIDTH] IN BLOOD BY AUTOMATED COUNT: 13.6 % (ref 11.6–15.1)
HCT VFR BLD AUTO: 28.9 % (ref 34.8–46.1)
HGB BLD-MCNC: 9.3 G/DL (ref 11.5–15.4)
IMM GRANULOCYTES # BLD AUTO: 0.06 THOUSAND/UL (ref 0–0.2)
IMM GRANULOCYTES NFR BLD AUTO: 1 % (ref 0–2)
LYMPHOCYTES # BLD AUTO: 0.83 THOUSANDS/ΜL (ref 0.6–4.47)
LYMPHOCYTES NFR BLD AUTO: 12 % (ref 14–44)
MCH RBC QN AUTO: 31.1 PG (ref 26.8–34.3)
MCHC RBC AUTO-ENTMCNC: 32.2 G/DL (ref 31.4–37.4)
MCV RBC AUTO: 97 FL (ref 82–98)
MONOCYTES # BLD AUTO: 0.6 THOUSAND/ΜL (ref 0.17–1.22)
MONOCYTES NFR BLD AUTO: 9 % (ref 4–12)
NEUTROPHILS # BLD AUTO: 5.38 THOUSANDS/ΜL (ref 1.85–7.62)
NEUTS SEG NFR BLD AUTO: 78 % (ref 43–75)
NRBC BLD AUTO-RTO: 0 /100 WBCS
PLATELET # BLD AUTO: 273 THOUSANDS/UL (ref 149–390)
PMV BLD AUTO: 10.7 FL (ref 8.9–12.7)
RBC # BLD AUTO: 2.99 MILLION/UL (ref 3.81–5.12)
WBC # BLD AUTO: 6.89 THOUSAND/UL (ref 4.31–10.16)

## 2020-03-04 PROCEDURE — 36415 COLL VENOUS BLD VENIPUNCTURE: CPT

## 2020-03-04 PROCEDURE — 85025 COMPLETE CBC W/AUTO DIFF WBC: CPT

## 2020-03-04 PROCEDURE — 77386 HB NTSTY MODUL RAD TX DLVR CPLX: CPT | Performed by: STUDENT IN AN ORGANIZED HEALTH CARE EDUCATION/TRAINING PROGRAM

## 2020-03-05 ENCOUNTER — APPOINTMENT (OUTPATIENT)
Dept: RADIATION ONCOLOGY | Facility: HOSPITAL | Age: 65
End: 2020-03-05
Attending: STUDENT IN AN ORGANIZED HEALTH CARE EDUCATION/TRAINING PROGRAM
Payer: COMMERCIAL

## 2020-03-05 PROCEDURE — 77386 HB NTSTY MODUL RAD TX DLVR CPLX: CPT | Performed by: STUDENT IN AN ORGANIZED HEALTH CARE EDUCATION/TRAINING PROGRAM

## 2020-03-06 ENCOUNTER — TRANSCRIBE ORDERS (OUTPATIENT)
Dept: RADIATION ONCOLOGY | Facility: HOSPITAL | Age: 65
End: 2020-03-06

## 2020-03-06 ENCOUNTER — APPOINTMENT (OUTPATIENT)
Dept: RADIATION ONCOLOGY | Facility: HOSPITAL | Age: 65
End: 2020-03-06
Attending: STUDENT IN AN ORGANIZED HEALTH CARE EDUCATION/TRAINING PROGRAM
Payer: COMMERCIAL

## 2020-03-06 PROCEDURE — 77417 THER RADIOLOGY PORT IMAGE(S): CPT | Performed by: STUDENT IN AN ORGANIZED HEALTH CARE EDUCATION/TRAINING PROGRAM

## 2020-03-06 PROCEDURE — 77336 RADIATION PHYSICS CONSULT: CPT | Performed by: STUDENT IN AN ORGANIZED HEALTH CARE EDUCATION/TRAINING PROGRAM

## 2020-03-06 PROCEDURE — 77386 HB NTSTY MODUL RAD TX DLVR CPLX: CPT | Performed by: STUDENT IN AN ORGANIZED HEALTH CARE EDUCATION/TRAINING PROGRAM

## 2020-03-06 NOTE — PROGRESS NOTES
Outpatient Oncology Nutrition Consult  Type of Consult: Follow Up  Care Location: Telephone Call     Nutrition Assessment:  PMH: anemia, depression, DM, HLD, HTN, obesity, RNYGB (2001 at [facility])  11/2719: Pt reports that on Monday her nephrostomy bag was capped and she can now urinate normally  Oncology Diagnosis & Treatments: Endometrial cancer  S/p surgery 12/19/17  Recurrence 7/8/19  S/p chemotherapy (carboplatin/taxol from 7/30/19-1/6/20)  Currently undergoing whole pelvis RT (started 2/4/20)  Endometrial cancer ([facility] [address] )    11/17/2017 Initial Diagnosis     Endometrial cancer ([facility] [address] )      11/17/2017 Biopsy     ENDOMETRIAL BIOPSY: WELL DIFFERENTIATED ENDOMETRIAL ADENOCARCINOMA (FIGO I) WITH FOCALMUCINOUS FEATURES  Part B: ENDOCERVICAL POLYP:BENIGN ENDOCERVICAL       12/19/2017 Surgery     Robotic assisted total laparoscopic hysterectomy with bilateral salpingo-oophorectomy and sentinel bilateral pelvic lymph node dissection  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 (4 4 x 3 2 cm tumor, 9 4/15 4mm invasion, NO LVSI, washings revealed atypical cellular changes)      12/19/2017 Genetic Testing     Morrison testing negative      6/28/2019 Biopsy     A  Breast, Right, US BX Right Breast 1000 4 cmfn:  - Benign breast tissue with focal histiocytic aggregate  See comment   - Negative for atypia and in-situ or invasive carcinoma  7/8/2019 Recurrence     Presented with right lower extremity DVT and CT demonstrating right pelvic sidewall mass with venous, ureteral and nerve compression causing significant neuropathic pain  Biopsy:  Lymph Node, Right pelvic lymph node x3:  - High-grade adenocarcinoma  7/30/2019 - 1/6/2020 Chemotherapy     Taxol 175 mg/m2 and carboplatin AUC 6 every 21 days  Dose was reduced to taxol 135 mg/m2 and carboplatin AUC 5  Completed 6 cycles  Treatment protracted due to multiple hospital admissions         Past Medical History:   Diagnosis Date    Anemia  Chemotherapy follow-up examination     Depression     Endometrial [facility] ([facility] ) 12/2017    Hyperglycemia     vx type 2 dm -- last assessed 4/1/14; resolved 11/7/17    Hyperlipidemia     Hypertension     Obesity     last assessed 10/14/17; resolved 11/7/17     Past Surgical History:   Procedure Laterality Date    ABDOMINAL SURGERY      GASTRIC BYPASS    CHOLECYSTECTOMY      at the time of gastric bypass    COLONOSCOPY      CT NEEDLE BIOPSY LYMPH NODE  7/8/2019    FL GUIDED CENTRAL VENOUS ACCESS DEVICE INSERTION  11/12/2019    GASTRIC BYPASS      HYSTERECTOMY Bilateral     total abdominal with salpingo-oophorectomy    IR PICC LINE  9/27/2019    IR PORT PLACEMENT  7/26/2019    IR PORT REMOVAL  9/20/2019    IR TUBE PLACEMENT NEPHROSTOMY  7/26/2019    OOPHORECTOMY Bilateral     ME INSJ TUNNELED CTR VAD W/SUBQ PORT AGE 5 YR/> Left 11/12/2019    Procedure: INSERTION VENOUS PORT ( PORT-A-CATH) IR;  Surgeon: Chuckie De Paz DO;  [facility]: [facility];  Service: Interventional Radiology    ME LAP, RADICAL HYST W/ TUBE&OV, NODE BX N/A 12/19/2017    Procedure: HYSTERECTOMY LAPAROSCOPIC TOTAL ([address]) W/ ROBOTICS; BILATERAL SALPINGOOPHERECTOMY; LYMPH NODE DISSECTION; lysis of adhesions;  Surgeon: Clover Pollard MD;  Location: [facility];  Service: Gynecology Oncology    ME LAP,DIAGNOSTIC ABDOMEN N/A 12/19/2017    Procedure: LAPAROSCOPY DIAGNOSTIC;  Surgeon: Clover Pollard MD;  Location: [facility];  Service: Gynecology Oncology    TONSILLECTOMY      US GUIDED BREAST BIOPSY RIGHT COMPLETE Right 6/28/2019       Review of Medications:   Vitamins, Supplements and Herbals: yes: Vitamin D, Folic Acid, Align, calcium citrate TID, B12 1000 mcg QD, MVI BID (MVI does not have iron);  Iron 65 mg QD (2 hrs seperate from calcium) - Hx RNYGB     Current Outpatient Medications:     acetaminophen (TYLENOL) 325 mg tablet, Take 2 tablets (650 mg total) by mouth every 6 (six) hours as needed for mild pain, headaches or fever, Disp: 30 tablet, Rfl: 0    ARIPiprazole (ABILIFY) 10 mg tablet, Take 10 mg by mouth daily, Disp: , Rfl:     aspirin (ECOTRIN LOW STRENGTH) 81 mg EC tablet, Take 81 mg by mouth daily, Disp: , Rfl:     cholecalciferol (VITAMIN D3) 1,000 units tablet, Take 1,000 Units by mouth daily, Disp: , Rfl:     enoxaparin (LOVENOX) 120 mg/0 8 mL, Inject 0 8 mL (120 mg total) under the skin daily, Disp: 30 Syringe, Rfl: 3    folic acid (FOLVITE) 1 mg tablet, Take 1 tablet (1 mg total) by mouth daily, Disp: , Rfl: 0    gemfibrozil (LOPID) 600 mg tablet, TAKE 1 TABLET BY MOUTH DAILY, Disp: 90 tablet, Rfl: 1    hydrOXYzine HCL (ATARAX) 25 mg tablet, Take 1 tablet (25 mg total) by mouth every 6 (six) hours as needed for itching, Disp: 30 tablet, Rfl: 0    MYRBETRIQ 50 MG TB24, TAKE 1 TABLET BY MOUTH EVERY DAY, Disp: 90 tablet, Rfl: 3    omeprazole (PriLOSEC) 20 mg delayed release capsule, Take 20 mg by mouth daily, Disp: , Rfl:     ondansetron (ZOFRAN) 8 mg tablet, Take 1 tablet (8 mg total) by mouth every 8 (eight) hours as needed for nausea or vomiting, Disp: 30 tablet, Rfl: 1    quinapril (ACCUPRIL) 5 mg tablet, TAKE 1 TABLET BY MOUTH EVERYDAY AT BEDTIME, Disp: 90 tablet, Rfl: 1    saccharomyces boulardii (FLORASTOR) 250 mg capsule, Take 1 capsule (250 mg total) by mouth 2 (two) times a day, Disp: , Rfl: 0    venlafaxine (EFFEXOR) 75 mg tablet, Take 75 mg by mouth 3 (three) times a day  , Disp: , Rfl:     Most Recent Lab Results:  Lab Results   Component Value Date    WBC 3 65 (L) 03/11/2020    IRON 24 (L) 10/02/2019    TIBC 138 (L) 10/02/2019    FERRITIN 1,042 (H) 10/02/2019    CHOLESTEROL 193 11/01/2018    CHOL 183 10/27/2017    TRIG 88 11/01/2018    HDL 59 11/01/2018    LDLCALC 106 (H) 10/27/2017    ALT 12 03/11/2020    AST 8 03/11/2020    ALB 3 4 (L) 03/11/2020     10/27/2017     05/12/2017    SODIUM 140 03/11/2020    SODIUM 136 02/24/2020    K 3 6 03/11/2020    K 4 0 02/24/2020     (H) 03/11/2020    BUN 16 03/11/2020    BUN 24 02/24/2020    CREATININE 0 82 03/11/2020    CREATININE 0 93 02/24/2020    EGFR 77 03/11/2020    PHOS 3 5 12/25/2019    PHOS 3 2 07/08/2019    GLUCOSE 219 (H) 12/19/2017    POCGLU 97 01/27/2020    GLUF 85 01/17/2020    GLUF 133 (H) 01/08/2020    GLUC 95 03/11/2020    HGBA1C 5 1 02/03/2020    HGBA1C 6 2 08/28/2019    HGBA1C 5 5 11/01/2018    CALCIUM 9 5 03/11/2020    FOLATE 5 4 10/06/2019    MG 1 9 03/11/2020     Anthropometric Measurements:   Height: 59"  Ht Readings from Last 1 Encounters:   02/03/20 4' 11" (1 499 m)     -Weight History:   Usual Weight: 270# before RNYGB in 2001; lowest post-op wt: 200#; wt typically fluctuates between 215-230#  Ideal Body Weight: 95#  Wt Readings from Last 20 Encounters:   02/04/20 85 5 kg (188 lb 6 4 oz)   02/03/20 85 7 kg (189 lb)   01/31/20 83 9 kg (185 lb)   01/24/20 83 9 kg (185 lb)   01/08/20 83 9 kg (185 lb)   01/06/20 84 4 kg (186 lb)   01/05/20 84 4 kg (186 lb 1 1 oz)   01/03/20 84 4 kg (186 lb)   01/02/20 84 4 kg (186 lb)   12/24/19 82 6 kg (182 lb 1 6 oz)   12/19/19 81 9 kg (180 lb 8 oz)   12/02/19 88 7 kg (195 lb 8 oz)   11/27/19 88 kg (193 lb 14 4 oz)   11/27/19 88 kg (194 lb)   11/25/19 87 5 kg (193 lb)   11/19/19 86 2 kg (190 lb)   11/11/19 86 2 kg (190 lb)   11/07/19 86 7 kg (191 lb 3 2 oz)   11/06/19 87 5 kg (193 lb)   10/29/19 88 8 kg (195 lb 12 8 oz)   Varian: (2/25/20) 185 6#, (3/3/20) 188 6#, (3/10/20) 187 2#  Home Scale Wts: ~185# in the past 2 weeks but was ~189# 3-4 weeks ago, (2/19/20) 185#, has not weighed self recently at home  Weight Changes: gain of 1 6#/ (0 9%) in 2 weeks (not significant) and loss of 8 3# (4 2%) in 3 months (not significant)   -Pt previously stated that she had been losing 10# every month since her cancer dx in June 2019  Estimated body mass index is 38 05 kg/m² as calculated from the following:    Height as of 2/3/20: 4' 11" (1 499 m)  Weight as of 2/4/20: 85 5 kg (188 lb 6 4 oz)  Nutrition-Focused Physical Findings: n/a due to telephone call, none observed last in-person visit on 11/27/19    Food/Nutrition-Related History & Client/Social History:    Current Nutrition Impact Symptoms:  [] Nausea  [] Reduced Appetite  [] Acid Reflux - on omeprazole   [] Vomiting  [] Unintended Wt Loss  [] Malabsorption    [x] Diarrhea - on Saturday  -took imodium x1 which resolved it  -now having small, soft BM's daily [] Unintended Wt Gain  [] Dumping Syndrome   [] Constipation [] Thick Mucous/Secretions  [] Abdominal Pain    [] Dysgeusia (Altered Taste) [] Xerostomia (Dry Mouth) - under control; using Biotene toothpaste + mouthwash [] Gas    [] Dysosmia (Altered Smell)  [] Thrush  [] Difficulty Chewing    [] Oral Mucositis (Sore Mouth)  [x] Fatigue  [] Other:    [] Odynophagia   [] Esophagitis  [] Other:    [] Dysphagia [] Early Satiety  [] No Problems Eating      Food Allergies: yes: had skin patch testing which showed allergy to strawberries, but is able to eat strawberries without any side effects  Food Intolerances: no    Current Diet: Regular Diet, No Restrictions  Current Nutrition Intake: Increased since last visit  Appetite: Good   Nutrition Route: PO  Meal planning/preparation mainly done by: mother does cooking; sister does shopping  Activity level: more active with daily life and errands  Social Hx: 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  Brother lives in [place]  Discharged from Home on 11/6/19  Now living with sister and mother       24 Hr Diet Recall:   Breakfast: pears and vanilla Greek yogurt  Lunch: Korean onion soup, 1 slice of bread with butter  Dinner: [employer]: half ham and swiss sandwich on whole wheat with a small container of macaroni and cheese  Snack: fruit with whipped topping    Beverages: water (16 9 oz x3-4), iced tea with Splenda (none due to urinary incontinence)   -Does not drink coffee or alcohol      -Does not separate fluids from solids, does not have discomfort while eating and drinking at the same time    -Average Fluid Intake: 51-68 oz (% est needs)  Supplements: did not start [employer] every other day as discussed last visit   None    11/11/19 Re-Estimated Nutrition Needs: (based on 54kg/ 118 8# adjusted wt)    Energy Needs: 9584-4217 kcal/day (35-40 kcal/kg)  Protein Needs:  grams/day (1 5-2 g/kg)  Fluid Needs: 6025-6618 mL/day (1 mL/kcal) - 63-72 oz    Discussion/Summary: Kennedi Berry was contacted today for RD follow up  She continues to do well and has maintained her wt over the past two weeks  She is in her third week of whole pelvis RT  She now reports a good appetite and increased po intake  She did not start the [employer] as discussed last visit because she doesn't think that she needs them now; I concur with her decision  She had one episode of diarrhea on Saturday which was resolved by imodium  She has been having small, soft BM's each day since  She has stopped drinking tea due to some urinary incontinence and is only hydrating with water at this time  She continues to meet % of her est fluid needs  Reviewed her fluid goals for now and if diarrhea returns  We reviewed how to adjust her po food intake if diarrhea develops further  She will continue the current nutrition plan of care for now  We will follow up in 2 weeks  Discussed/Reviewed:   · weight management  · indications & use of oral nutrition supplements  · high protein foods to include at all meals and snacks  · ways to increase overall calorie intake  · encouraged eating every 2-3 hours (5-6 small meals/day)  · adequate hydation & tips to increase overall fluid intake  · MNT for: RNY Gastric Bypass  · MNT for patient specific tx plan potential for diarrhea r/t whole pelvis RT  · Individualized fluid needs   · RNY Gastric Bypass lifetime vitamin/mineral supplementation    All questions and concerns addressed during todays visit  Eren Jennifer has RD contact information  Nutrition Diagnosis:  · Increased Nutrient Needs (kcal & pro) related to increased demand for nutrients and disease state as evidenced by cancer dx and pt undergoing tx for cancer  Intervention & Recommendations:  Topics addressed: Nutrition education, Balance/Variety, Meals & Snacks, Meal planning, Choosing high protein meals/snacks, Meal pattern: eating small/frequent meals (every 2-3 hours), Nutrition Symptom Management, Adequate Hydration, Medical Food Supplements, Nutrition-Related Medication Management, Vitamin & Mineral Supplements and Weight Management    Barriers: None  Readiness to change: action  Comprehension: verbalizes understanding  Expected Compliance: good    Materials Provided: not applicable      Monitoring & Evaluation:  Dietitian to Monitor: Food and Nutrition Intake, Nutrtion Impact Symptoms, Body Weight, Vitamin/Mineral Intake and Biochemical Data     Goals:  · weight maintenance/stabilization  · adequate nutrition impact symptom management  · pt to meet >/=75% estimated nutrition needs daily  · Continue lifelong vitamin/mineral supplementation for RNY Gastric Bypass    · Progress Towards Goals: Progressing     Nutrition Rx & Recommendations:  · Diet: High Calorie, High Protein (for high calorie foods see pages 52-53, and for high protein foods see pages 49-51 in your Eating Hints book)  If having Diarrhea: Low Fiber, High Calorie/Protein Diet  · Small, frequent meals/snacks may be easier to tolerate than 3 large daily meals  Aim for 5-6 small meals per day (every 2-3 hours)  · Include protein at all meals/snacks  · Stay hydrated by sipping fluids of choice/tolerance throughout the day  · For diarrhea: drink plenty of fluids (>64 oz/day), avoid carbonation; eat 5-6 small meals/day; include high sodium & potassium foods/liquids (Sodium: soup/broth;   Potassium: bananas, canned apricots, potatoes); eat low-fiber foods; choose foods/liquids that are room temperature; avoid foods/drinks that can make diarrhea worse (ex  high fiber, high sugar, hot/cold drinks, greasy/fatty foods, gas forming foods such as beans, raw produce, milk products, alcohol, spicy foods, caffeine, sugar alcohols (xylitol, sorbitol), and apple juice (high in sorbitol) (see pages 15-16 in your Eating Hints book)  If diarrhea is severe, consider adding Banatrol (banana flakes) 1-3 times per day mixed in applesauce (can be purchased online from [address] [place] [address])  · High protein foods to try: [place] yogurt with fruit, trail mix, oral nutrition supplements, eggs, meats, poultry, fish, cheese sticks, pudding, peanut butter, nuts, rolled up chicken/turkey breast, hummus (see pages 49-51 in your Eating Hints book)   · Increase fluids to 63-72 oz per day, drink more fluid if diarrhea occurs  · Fluid options for diarrhea: sugar-free sports drinks, broth, sugar-free gelatin, water, diluted juice  · If you experience diarrhea, follow a low-fiber diet, 5-6 small meals per day; review Diarrhea and Low Fiber Diet handouts  · Choose foods with <3 grams of fiber per serving (read food label)  · You may benefit from reducing milk products (lactose) when diarrhea is severe, you may try a low-lactose diet for a few days to see if it helps    · Continue oral probiotic and Greek yogurt  · Lifetime vitamin/mineral supplementation recommended for Gastric Bypass:  · Multivitamin BID  · Iron 45 mg daily  · Vitamin D daily  · Calcium Citrate 500 mg TID (do not take with iron, needs to be 2 hrs apart from iron)  · B12 1000 mcg daily      Follow Up Plan: 3/25/20 @ 1:30pm for a phone follow up (5th week of RT)  Recommend Referral to Other Providers: none at this time

## 2020-03-09 ENCOUNTER — APPOINTMENT (OUTPATIENT)
Dept: RADIATION ONCOLOGY | Facility: HOSPITAL | Age: 65
End: 2020-03-09
Attending: STUDENT IN AN ORGANIZED HEALTH CARE EDUCATION/TRAINING PROGRAM
Payer: COMMERCIAL

## 2020-03-09 PROCEDURE — 77386 HB NTSTY MODUL RAD TX DLVR CPLX: CPT | Performed by: STUDENT IN AN ORGANIZED HEALTH CARE EDUCATION/TRAINING PROGRAM

## 2020-03-10 ENCOUNTER — APPOINTMENT (OUTPATIENT)
Dept: RADIATION ONCOLOGY | Facility: HOSPITAL | Age: 65
End: 2020-03-10
Attending: STUDENT IN AN ORGANIZED HEALTH CARE EDUCATION/TRAINING PROGRAM
Payer: COMMERCIAL

## 2020-03-10 PROCEDURE — 77386 HB NTSTY MODUL RAD TX DLVR CPLX: CPT | Performed by: STUDENT IN AN ORGANIZED HEALTH CARE EDUCATION/TRAINING PROGRAM

## 2020-03-11 ENCOUNTER — APPOINTMENT (OUTPATIENT)
Dept: LAB | Facility: HOSPITAL | Age: 65
End: 2020-03-11
Payer: COMMERCIAL

## 2020-03-11 ENCOUNTER — APPOINTMENT (OUTPATIENT)
Dept: RADIATION ONCOLOGY | Facility: HOSPITAL | Age: 65
End: 2020-03-11
Attending: STUDENT IN AN ORGANIZED HEALTH CARE EDUCATION/TRAINING PROGRAM
Payer: COMMERCIAL

## 2020-03-11 ENCOUNTER — NUTRITION (OUTPATIENT)
Dept: NUTRITION | Facility: CLINIC | Age: 65
End: 2020-03-11

## 2020-03-11 DIAGNOSIS — Z71.3 NUTRITIONAL COUNSELING: Primary | ICD-10-CM

## 2020-03-11 PROCEDURE — 77386 HB NTSTY MODUL RAD TX DLVR CPLX: CPT | Performed by: STUDENT IN AN ORGANIZED HEALTH CARE EDUCATION/TRAINING PROGRAM

## 2020-03-11 NOTE — PATIENT INSTRUCTIONS
Nutrition Rx & Recommendations:  · Diet: High Calorie, High Protein (for high calorie foods see pages 52-53, and for high protein foods see pages 49-51 in your Eating Hints book)  If having Diarrhea: Low Fiber, High Calorie/Protein Diet  · Small, frequent meals/snacks may be easier to tolerate than 3 large daily meals  Aim for 5-6 small meals per day (every 2-3 hours)  · Include protein at all meals/snacks  · Stay hydrated by sipping fluids of choice/tolerance throughout the day  · For diarrhea: drink plenty of fluids (>64 oz/day), avoid carbonation; eat 5-6 small meals/day; include high sodium & potassium foods/liquids (Sodium: soup/broth; Potassium: bananas, canned apricots, potatoes); eat low-fiber foods; choose foods/liquids that are room temperature; avoid foods/drinks that can make diarrhea worse (ex  high fiber, high sugar, hot/cold drinks, greasy/fatty foods, gas forming foods such as beans, raw produce, milk products, alcohol, spicy foods, caffeine, sugar alcohols (xylitol, sorbitol), and apple juice (high in sorbitol) (see pages 15-16 in your Eating Hints book)  If diarrhea is severe, consider adding Banatrol (banana flakes) 1-3 times per day mixed in applesauce (can be purchased online from [address] [place] [address])  · High protein foods to try: [place] yogurt with fruit, trail mix, oral nutrition supplements, eggs, meats, poultry, fish, cheese sticks, pudding, peanut butter, nuts, rolled up chicken/turkey breast, hummus (see pages 49-51 in your Eating Hints book)   · Increase fluids to 63-72 oz per day, drink more fluid if diarrhea occurs    · Fluid options for diarrhea: sugar-free sports drinks, broth, sugar-free gelatin, water, diluted juice  · If you experience diarrhea, follow a low-fiber diet, 5-6 small meals per day; review Diarrhea and Low Fiber Diet handouts  · Choose foods with <3 grams of fiber per serving (read food label)  · You may benefit from reducing milk products (lactose) when diarrhea is severe, you may try a low-lactose diet for a few days to see if it helps    · Continue oral probiotic and Greek yogurt  · Lifetime vitamin/mineral supplementation recommended for Gastric Bypass:  · Multivitamin BID  · Iron 45 mg daily  · Vitamin D daily  · Calcium Citrate 500 mg TID (do not take with iron, needs to be 2 hrs apart from iron)  · B12 1000 mcg daily      Follow Up Plan: 3/25/20 @ 1:30pm for a phone follow up (5th week of RT)  Recommend Referral to Other Providers: none at this time

## 2020-03-12 ENCOUNTER — APPOINTMENT (OUTPATIENT)
Dept: RADIATION ONCOLOGY | Facility: HOSPITAL | Age: 65
End: 2020-03-12
Attending: STUDENT IN AN ORGANIZED HEALTH CARE EDUCATION/TRAINING PROGRAM
Payer: COMMERCIAL

## 2020-03-12 PROCEDURE — 77386 HB NTSTY MODUL RAD TX DLVR CPLX: CPT | Performed by: STUDENT IN AN ORGANIZED HEALTH CARE EDUCATION/TRAINING PROGRAM

## 2020-03-13 ENCOUNTER — APPOINTMENT (OUTPATIENT)
Dept: RADIATION ONCOLOGY | Facility: HOSPITAL | Age: 65
End: 2020-03-13
Attending: STUDENT IN AN ORGANIZED HEALTH CARE EDUCATION/TRAINING PROGRAM
Payer: COMMERCIAL

## 2020-03-13 DIAGNOSIS — E78.5 DYSLIPIDEMIA: ICD-10-CM

## 2020-03-14 RX ORDER — GEMFIBROZIL 600 MG/1
TABLET, FILM COATED ORAL
Qty: 90 TABLET | Refills: 1 | Status: SHIPPED | OUTPATIENT
Start: 2020-03-14 | End: 2020-09-24

## 2020-03-16 ENCOUNTER — APPOINTMENT (OUTPATIENT)
Dept: RADIATION ONCOLOGY | Facility: HOSPITAL | Age: 65
End: 2020-03-16
Attending: STUDENT IN AN ORGANIZED HEALTH CARE EDUCATION/TRAINING PROGRAM
Payer: COMMERCIAL

## 2020-03-16 PROCEDURE — 77386 HB NTSTY MODUL RAD TX DLVR CPLX: CPT | Performed by: STUDENT IN AN ORGANIZED HEALTH CARE EDUCATION/TRAINING PROGRAM

## 2020-03-16 PROCEDURE — 77336 RADIATION PHYSICS CONSULT: CPT | Performed by: STUDENT IN AN ORGANIZED HEALTH CARE EDUCATION/TRAINING PROGRAM

## 2020-03-17 ENCOUNTER — APPOINTMENT (OUTPATIENT)
Dept: RADIATION ONCOLOGY | Facility: HOSPITAL | Age: 65
End: 2020-03-17
Attending: STUDENT IN AN ORGANIZED HEALTH CARE EDUCATION/TRAINING PROGRAM
Payer: COMMERCIAL

## 2020-03-17 PROCEDURE — 77386 HB NTSTY MODUL RAD TX DLVR CPLX: CPT | Performed by: STUDENT IN AN ORGANIZED HEALTH CARE EDUCATION/TRAINING PROGRAM

## 2020-03-18 ENCOUNTER — APPOINTMENT (OUTPATIENT)
Dept: RADIATION ONCOLOGY | Facility: HOSPITAL | Age: 65
End: 2020-03-18
Attending: STUDENT IN AN ORGANIZED HEALTH CARE EDUCATION/TRAINING PROGRAM
Payer: COMMERCIAL

## 2020-03-18 ENCOUNTER — APPOINTMENT (OUTPATIENT)
Dept: LAB | Facility: HOSPITAL | Age: 65
End: 2020-03-18
Attending: RADIOLOGY
Payer: COMMERCIAL

## 2020-03-18 DIAGNOSIS — C54.1 ENDOMETRIAL CANCER (HCC): ICD-10-CM

## 2020-03-18 LAB
BASOPHILS # BLD AUTO: 0.01 THOUSANDS/ΜL (ref 0–0.1)
BASOPHILS NFR BLD AUTO: 0 % (ref 0–1)
EOSINOPHIL # BLD AUTO: 0.01 THOUSAND/ΜL (ref 0–0.61)
EOSINOPHIL NFR BLD AUTO: 0 % (ref 0–6)
ERYTHROCYTE [DISTWIDTH] IN BLOOD BY AUTOMATED COUNT: 13.6 % (ref 11.6–15.1)
HCT VFR BLD AUTO: 27.9 % (ref 34.8–46.1)
HGB BLD-MCNC: 8.9 G/DL (ref 11.5–15.4)
IMM GRANULOCYTES # BLD AUTO: 0.05 THOUSAND/UL (ref 0–0.2)
IMM GRANULOCYTES NFR BLD AUTO: 1 % (ref 0–2)
LYMPHOCYTES # BLD AUTO: 0.48 THOUSANDS/ΜL (ref 0.6–4.47)
LYMPHOCYTES NFR BLD AUTO: 8 % (ref 14–44)
MCH RBC QN AUTO: 30.5 PG (ref 26.8–34.3)
MCHC RBC AUTO-ENTMCNC: 31.9 G/DL (ref 31.4–37.4)
MCV RBC AUTO: 96 FL (ref 82–98)
MONOCYTES # BLD AUTO: 0.65 THOUSAND/ΜL (ref 0.17–1.22)
MONOCYTES NFR BLD AUTO: 11 % (ref 4–12)
NEUTROPHILS # BLD AUTO: 4.53 THOUSANDS/ΜL (ref 1.85–7.62)
NEUTS SEG NFR BLD AUTO: 80 % (ref 43–75)
NRBC BLD AUTO-RTO: 0 /100 WBCS
PLATELET # BLD AUTO: 210 THOUSANDS/UL (ref 149–390)
PMV BLD AUTO: 9.8 FL (ref 8.9–12.7)
RBC # BLD AUTO: 2.92 MILLION/UL (ref 3.81–5.12)
WBC # BLD AUTO: 5.73 THOUSAND/UL (ref 4.31–10.16)

## 2020-03-18 PROCEDURE — 85025 COMPLETE CBC W/AUTO DIFF WBC: CPT

## 2020-03-18 PROCEDURE — 77386 HB NTSTY MODUL RAD TX DLVR CPLX: CPT | Performed by: STUDENT IN AN ORGANIZED HEALTH CARE EDUCATION/TRAINING PROGRAM

## 2020-03-18 PROCEDURE — 36415 COLL VENOUS BLD VENIPUNCTURE: CPT

## 2020-03-19 ENCOUNTER — APPOINTMENT (OUTPATIENT)
Dept: RADIATION ONCOLOGY | Facility: HOSPITAL | Age: 65
End: 2020-03-19
Attending: STUDENT IN AN ORGANIZED HEALTH CARE EDUCATION/TRAINING PROGRAM
Payer: COMMERCIAL

## 2020-03-19 PROCEDURE — 77386 HB NTSTY MODUL RAD TX DLVR CPLX: CPT | Performed by: STUDENT IN AN ORGANIZED HEALTH CARE EDUCATION/TRAINING PROGRAM

## 2020-03-20 ENCOUNTER — APPOINTMENT (OUTPATIENT)
Dept: RADIATION ONCOLOGY | Facility: HOSPITAL | Age: 65
End: 2020-03-20
Attending: STUDENT IN AN ORGANIZED HEALTH CARE EDUCATION/TRAINING PROGRAM
Payer: COMMERCIAL

## 2020-03-20 PROCEDURE — 77386 HB NTSTY MODUL RAD TX DLVR CPLX: CPT | Performed by: STUDENT IN AN ORGANIZED HEALTH CARE EDUCATION/TRAINING PROGRAM

## 2020-03-20 NOTE — PROGRESS NOTES
Outpatient Oncology Nutrition Consult  Type of Consult: Follow Up  Care Location: Telephone Call   Nutrition Assessment:  PMH: anemia, depression, DM, HLD, HTN, obesity, RNYGB (2001 at [facility])  11/2719: Pt reports that on Monday her nephrostomy bag was capped and she can now urinate normally  Oncology Diagnosis & Treatments: Endometrial cancer  S/p surgery 12/19/17  Recurrence 7/8/19  S/p chemotherapy (carboplatin/taxol from 7/30/19-1/6/20)  Currently undergoing whole pelvis RT (started 2/4/20; EOT planned for 4/13/20)  Endometrial cancer ([facility] [address] )    11/17/2017 Initial Diagnosis     Endometrial cancer ([facility] [address] )      11/17/2017 Biopsy     ENDOMETRIAL BIOPSY: WELL DIFFERENTIATED ENDOMETRIAL ADENOCARCINOMA (FIGO I) WITH FOCALMUCINOUS FEATURES  Part B: ENDOCERVICAL POLYP:BENIGN ENDOCERVICAL       12/19/2017 Surgery     Robotic assisted total laparoscopic hysterectomy with bilateral salpingo-oophorectomy and sentinel bilateral pelvic lymph node dissection  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 (4 4 x 3 2 cm tumor, 9 4/15 4mm invasion, NO LVSI, washings revealed atypical cellular changes)      12/19/2017 Genetic Testing     Morrison testing negative      6/28/2019 Biopsy     A  Breast, Right, US BX Right Breast 1000 4 cmfn:  - Benign breast tissue with focal histiocytic aggregate  See comment   - Negative for atypia and in-situ or invasive carcinoma  7/8/2019 Recurrence     Presented with right lower extremity DVT and CT demonstrating right pelvic sidewall mass with venous, ureteral and nerve compression causing significant neuropathic pain  Biopsy:  Lymph Node, Right pelvic lymph node x3:  - High-grade adenocarcinoma  7/30/2019 - 1/6/2020 Chemotherapy     Taxol 175 mg/m2 and carboplatin AUC 6 every 21 days  Dose was reduced to taxol 135 mg/m2 and carboplatin AUC 5  Completed 6 cycles  Treatment protracted due to multiple hospital admissions         Past Medical History: Diagnosis Date    Anemia     Chemotherapy follow-up examination     Depression     Endometrial cancer ([facility] [address] ) 12/2017    Hyperglycemia     vx type 2 dm -- last assessed 4/1/14; resolved 11/7/17    Hyperlipidemia     Hypertension     Obesity     last assessed 10/14/17; resolved 11/7/17     Past Surgical History:   Procedure Laterality Date    ABDOMINAL SURGERY      GASTRIC BYPASS    CHOLECYSTECTOMY      at the time of gastric bypass    COLONOSCOPY      CT NEEDLE BIOPSY LYMPH NODE  7/8/2019    FL GUIDED CENTRAL VENOUS ACCESS DEVICE INSERTION  11/12/2019    GASTRIC BYPASS      HYSTERECTOMY Bilateral     total abdominal with salpingo-oophorectomy    IR PICC LINE  9/27/2019    IR PORT PLACEMENT  7/26/2019    IR PORT REMOVAL  9/20/2019    IR TUBE PLACEMENT NEPHROSTOMY  7/26/2019    OOPHORECTOMY Bilateral     KY INSJ TUNNELED CTR VAD W/SUBQ PORT AGE 5 YR/> Left 11/12/2019    Procedure: INSERTION VENOUS PORT ( PORT-A-CATH) IR;  Surgeon: Chuckie De Paz DO;  [facility];  Service: Interventional Radiology    [place] LAP, RADICAL HYST W/ TUBE&OV, NODE BX N/A 12/19/2017    Procedure: HYSTERECTOMY LAPAROSCOPIC TOTAL ([address]) W/ ROBOTICS; BILATERAL SALPINGOOPHERECTOMY; LYMPH NODE DISSECTION; lysis of adhesions;  Surgeon: Clover Pollard MD;  Location: [facility];  Service: Gynecology Oncology    [place] LAP,DIAGNOSTIC ABDOMEN N/A 12/19/2017    Procedure: LAPAROSCOPY DIAGNOSTIC;  Surgeon: Clover Pollard MD;  [facility];  Service: Gynecology Oncology    TONSILLECTOMY      US GUIDED BREAST BIOPSY RIGHT COMPLETE Right 6/28/2019       Review of Medications:   Vitamins, Supplements and Herbals: yes: Vitamin D, Folic Acid, Align, calcium citrate TID, B12 1000 mcg QD, MVI BID (MVI does not have iron);  Iron 65 mg QD (2 hrs seperate from calcium) - Hx RNYGB    Current Outpatient Medications:     acetaminophen (TYLENOL) 325 mg tablet, Take 2 tablets (650 mg total) by mouth every 6 (six) hours as needed for mild pain, headaches or fever, Disp: 30 tablet, Rfl: 0    ARIPiprazole (ABILIFY) 10 mg tablet, Take 10 mg by mouth daily, Disp: , Rfl:     aspirin (ECOTRIN LOW STRENGTH) 81 mg EC tablet, Take 81 mg by mouth daily, Disp: , Rfl:     cholecalciferol (VITAMIN D3) 1,000 units tablet, Take 1,000 Units by mouth daily, Disp: , Rfl:     enoxaparin (LOVENOX) 120 mg/0 8 mL, Inject 0 8 mL (120 mg total) under the skin daily, Disp: 30 Syringe, Rfl: 3    folic acid (FOLVITE) 1 mg tablet, Take 1 tablet (1 mg total) by mouth daily, Disp: , Rfl: 0    gemfibrozil (LOPID) 600 mg tablet, TAKE 1 TABLET BY MOUTH EVERY DAY, Disp: 90 tablet, Rfl: 1    hydrOXYzine HCL (ATARAX) 25 mg tablet, Take 1 tablet (25 mg total) by mouth every 6 (six) hours as needed for itching, Disp: 30 tablet, Rfl: 0    MYRBETRIQ 50 MG TB24, TAKE 1 TABLET BY MOUTH EVERY DAY, Disp: 90 tablet, Rfl: 3    omeprazole (PriLOSEC) 20 mg delayed release capsule, Take 20 mg by mouth daily, Disp: , Rfl:     ondansetron (ZOFRAN) 8 mg tablet, Take 1 tablet (8 mg total) by mouth every 8 (eight) hours as needed for nausea or vomiting, Disp: 30 tablet, Rfl: 1    quinapril (ACCUPRIL) 5 mg tablet, TAKE 1 TABLET BY MOUTH EVERYDAY AT BEDTIME, Disp: 90 tablet, Rfl: 1    saccharomyces boulardii (FLORASTOR) 250 mg capsule, Take 1 capsule (250 mg total) by mouth 2 (two) times a day, Disp: , Rfl: 0    venlafaxine (EFFEXOR) 75 mg tablet, Take 75 mg by mouth 3 (three) times a day  , Disp: , Rfl:     Most Recent Lab Results:  Lab Results   Component Value Date    WBC 6 26 03/24/2020    IRON 24 (L) 10/02/2019    TIBC 138 (L) 10/02/2019    FERRITIN 1,042 (H) 10/02/2019    CHOLESTEROL 193 11/01/2018    CHOL 183 10/27/2017    TRIG 88 11/01/2018    HDL 59 11/01/2018    LDLCALC 106 (H) 10/27/2017    ALT 12 03/11/2020    AST 8 03/11/2020    ALB 3 4 (L) 03/11/2020     10/27/2017     05/12/2017    SODIUM 140 03/11/2020    SODIUM 136 02/24/2020    K 3 6 03/11/2020    K 4 0 02/24/2020     (H) 03/11/2020    BUN 16 03/11/2020    BUN 24 02/24/2020    CREATININE 0 82 03/11/2020    CREATININE 0 93 02/24/2020    EGFR 77 03/11/2020    PHOS 3 5 12/25/2019    PHOS 3 2 07/08/2019    GLUCOSE 219 (H) 12/19/2017    POCGLU 97 01/27/2020    GLUF 85 01/17/2020    GLUF 133 (H) 01/08/2020    GLUC 95 03/11/2020    HGBA1C 5 1 02/03/2020    HGBA1C 6 2 08/28/2019    HGBA1C 5 5 11/01/2018    CALCIUM 9 5 03/11/2020    FOLATE 5 4 10/06/2019    MG 1 9 03/11/2020     Anthropometric Measurements:   Height: 59"  Ht Readings from Last 1 Encounters:   02/03/20 4' 11" (1 499 m)     -Weight History:   Usual Weight: 270# before RNYGB in 2001; lowest post-op wt: 200#; wt typically fluctuates between 215-230#  Ideal Body Weight: 95#  Wt Readings from Last 20 Encounters:   02/04/20 85 5 kg (188 lb 6 4 oz)   02/03/20 85 7 kg (189 lb)   01/31/20 83 9 kg (185 lb)   01/24/20 83 9 kg (185 lb)   01/08/20 83 9 kg (185 lb)   01/06/20 84 4 kg (186 lb)   01/05/20 84 4 kg (186 lb 1 1 oz)   01/03/20 84 4 kg (186 lb)   01/02/20 84 4 kg (186 lb)   12/24/19 82 6 kg (182 lb 1 6 oz)   12/19/19 81 9 kg (180 lb 8 oz)   12/02/19 88 7 kg (195 lb 8 oz)   11/27/19 88 kg (193 lb 14 4 oz)   11/27/19 88 kg (194 lb)   11/25/19 87 5 kg (193 lb)   11/19/19 86 2 kg (190 lb)   11/11/19 86 2 kg (190 lb)   11/07/19 86 7 kg (191 lb 3 2 oz)   11/06/19 87 5 kg (193 lb)   10/29/19 88 8 kg (195 lb 12 8 oz)   Varian: (2/25/20) 185 6#, (3/3/20) 188 6#, (3/10/20) 187 2#, (3/19/20) 186 2#, (3/24/20) 187#  Home Scale Wts: ~185# in the past 2 weeks but was ~189# 3-4 weeks ago, (2/19/20) 185#, has not weighed self recently at home  Weight Changes: Stable in 2 weeks (not significant) and loss of 9 2# (4 7%) in 6 months (not significant)   -Pt previously stated that she had been losing 10# every month since her cancer dx in June 2019  Estimated body mass index is 38 05 kg/m² as calculated from the following:    Height as of 2/3/20: 4' 11" (1 499 m)      Weight as of 2/4/20: 85 5 kg (188 lb 6 4 oz)  Nutrition-Focused Physical Findings: n/a due to telephone call, none observed last in-person visit on 11/27/19    Food/Nutrition-Related History & Client/Social History:    Current Nutrition Impact Symptoms:  [] Nausea  [] Reduced Appetite  [] Acid Reflux - on omeprazole   [] Vomiting  [] Unintended Wt Loss  [] Malabsorption    [] Diarrhea -  -having soft BM's daily to every other day [] Unintended Wt Gain  [] Dumping Syndrome   [] Constipation [] Thick Mucous/Secretions  [x] Abdominal Pain - occasional R sided abdominal pain   [] Dysgeusia (Altered Taste) [] Xerostomia (Dry Mouth) - under control; using Biotene toothpaste + mouthwash [] Gas    [] Dysosmia (Altered Smell)  [] Thrush  [] Difficulty Chewing    [] Oral Mucositis (Sore Mouth)  [x] Fatigue - increasing [] Other:    [] Odynophagia   [] Esophagitis  [] Other:    [] Dysphagia [] Early Satiety  [] No Problems Eating      Food Allergies: yes: had skin patch testing which showed allergy to strawberries, but is able to eat strawberries without any side effects  Food Intolerances: no    Current Diet: Regular Diet, No Restrictions  Current Nutrition Intake: Unchanged from last visit  Appetite: Good, forces self to eat if appetite is reduced  Nutrition Route: PO  Meal planning/preparation mainly done by: mother does cooking; sister does shopping  Activity level: sedentary due to quarantine  Social Hx: 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  Brother lives in [facility]  Discharged from [place] on 11/6/19  Now living with sister and mother       25 Hr Diet Recall:   Breakfast: diced pears in 100% fruit juice and vanilla [place] yogurt  Snack: cinnamon raisin Bagel with butter and peanut butter  Snack: peaches with whipped topping  Lunch: leftover eggplant Parmesan with penne pasta  Dinner: salami and cheese sandwich on 2 slices of white bread with wise, water  Snack: fruit with whipped topping    Beverages: water (16 9 oz x4-5), Gatorade (16 oz bottle of Gatorade diluted x1)   -Does not drink coffee or alcohol      -Does not separate fluids from solids, does not have discomfort while eating and drinking at the same time  Supplements:    None    11/11/19 Re-Estimated Nutrition Needs: (based on 54kg/ 118 8# adjusted wt)    Energy Needs: 0911-8193 kcal/day (35-40 kcal/kg)  Protein Needs:  grams/day (1 5-2 g/kg)  Fluid Needs: 0246-6935 mL/day (1 mL/kcal) - 63-72 oz    Discussion/Summary: Cathryn Zapata was contacted today for RD follow up  She continues to do well and has been maintaining her weight  She reports minimal nutrition impact sx  She has increasing fatigue  She is eating small and frequent meals as recommended  Her BM's are normal   Overall, her nutritional status is stable and she will continue the current nutrition plan of care at this time  We reviewed how to adjust her po food intake if diarrhea develops  Moving forward, we will follow up on a prn basis for symptom management and a long-term cancer preventative eating pattern education once tx concludes  She agrees to reach out to this RD at those times  Discussed/Reviewed:   · weight management  · indications & use of oral nutrition supplements  · high protein foods to include at all meals and snacks  · ways to increase overall calorie intake  · encouraged eating every 2-3 hours (5-6 small meals/day)  · adequate hydation & tips to increase overall fluid intake  · MNT for: RNY Gastric Bypass  · a cancer preventative eating pattern as a long-term nutrition goal  · MNT for patient specific tx plan potential for diarrhea r/t whole pelvis RT  · Individualized fluid needs   · RNY Gastric Bypass lifetime vitamin/mineral supplementation  All questions and concerns addressed during todays visit  Cathryn Zapata has RD contact information      Nutrition Diagnosis:  · Increased Nutrient Needs (kcal & pro) related to increased demand for nutrients and disease state as evidenced by cancer dx and pt undergoing tx for cancer  Intervention & Recommendations:  Topics addressed: Nutrition education, Balance/Variety, Meals & Snacks, Meal planning, Choosing high protein meals/snacks, Meal pattern: eating small/frequent meals (every 2-3 hours), Nutrition Symptom Management, Adequate Hydration, Medical Food Supplements, Nutrition-Related Medication Management, Vitamin & Mineral Supplements and Weight Management    Barriers: None  Readiness to change: action  Comprehension: verbalizes understanding  Expected Compliance: good    Materials Provided: not applicable      Monitoring & Evaluation:  Dietitian to Monitor: Food and Nutrition Intake, Nutrtion Impact Symptoms, Body Weight, Vitamin/Mineral Intake and Biochemical Data     Goals:  · weight maintenance/stabilization  · adequate nutrition impact symptom management  · pt to meet >/=75% estimated nutrition needs daily  · Continue lifelong vitamin/mineral supplementation for RNY Gastric Bypass    · Progress Towards Goals: Goal(s) Met     Nutrition Rx & Recommendations:  · Diet: High Calorie, High Protein (for high calorie foods see pages 52-53, and for high protein foods see pages 49-51 in your Eating Hints book)  If having Diarrhea: Low Fiber, High Calorie/Protein Diet  · Small, frequent meals/snacks may be easier to tolerate than 3 large daily meals  Aim for 5-6 small meals per day (every 2-3 hours)  · Include protein at all meals/snacks  · Stay hydrated by sipping fluids of choice/tolerance throughout the day  · For diarrhea: drink plenty of fluids (>64 oz/day), avoid carbonation; eat 5-6 small meals/day; include high sodium & potassium foods/liquids (Sodium: soup/broth;   Potassium: bananas, canned apricots, potatoes); eat low-fiber foods; choose foods/liquids that are room temperature; avoid foods/drinks that can make diarrhea worse (ex  high fiber, high sugar, hot/cold drinks, greasy/fatty foods, gas forming foods such as beans, raw produce, milk products, alcohol, spicy foods, caffeine, sugar alcohols (xylitol, sorbitol), and apple juice (high in sorbitol) (see pages 15-16 in your Eating Hints book)  If diarrhea is severe, consider adding Banatrol (banana flakes) 1-3 times per day mixed in applesauce (can be purchased online from [address] [place], [place] [address])  · High protein foods to try: [place] yogurt with fruit, trail mix, oral nutrition supplements, eggs, meats, poultry, fish, cheese sticks, pudding, peanut butter, nuts, rolled up chicken/turkey breast, hummus (see pages 49-51 in your Eating Hints book)   · Fluid Goal: 63-72 oz per day, drink more fluid if diarrhea occurs  · Fluid options for diarrhea: sugar-free sports drinks, broth, sugar-free gelatin, water, diluted juice  · If you experience diarrhea, follow a low-fiber diet, 5-6 small meals per day; review Diarrhea and Low Fiber Diet handouts  · Choose foods with <3 grams of fiber per serving (read food label)  · You may benefit from reducing milk products (lactose) when diarrhea is severe, you may try a low-lactose diet for a few days to see if it helps    · Continue oral probiotic and Greek yogurt  · Lifetime vitamin/mineral supplementation recommended for Gastric Bypass:  · Multivitamin BID  · Iron 45 mg daily  · Vitamin D daily  · Calcium Citrate 500 mg TID (do not take with iron, needs to be 2 hrs apart from iron)  · B12 1000 mcg daily      Follow Up Plan: PRN for symptom management and cancer preventative long-term eating pattern once tx is finished   Recommend Referral to Other Providers: none at this time

## 2020-03-23 ENCOUNTER — TRANSCRIBE ORDERS (OUTPATIENT)
Dept: RADIATION ONCOLOGY | Facility: HOSPITAL | Age: 65
End: 2020-03-23

## 2020-03-23 ENCOUNTER — APPOINTMENT (OUTPATIENT)
Dept: RADIATION ONCOLOGY | Facility: HOSPITAL | Age: 65
End: 2020-03-23
Attending: STUDENT IN AN ORGANIZED HEALTH CARE EDUCATION/TRAINING PROGRAM
Payer: COMMERCIAL

## 2020-03-23 DIAGNOSIS — C54.1 ENDOMETRIAL CANCER (HCC): Primary | ICD-10-CM

## 2020-03-23 PROCEDURE — 77336 RADIATION PHYSICS CONSULT: CPT | Performed by: STUDENT IN AN ORGANIZED HEALTH CARE EDUCATION/TRAINING PROGRAM

## 2020-03-23 PROCEDURE — 77386 HB NTSTY MODUL RAD TX DLVR CPLX: CPT | Performed by: STUDENT IN AN ORGANIZED HEALTH CARE EDUCATION/TRAINING PROGRAM

## 2020-03-24 ENCOUNTER — APPOINTMENT (OUTPATIENT)
Dept: RADIATION ONCOLOGY | Facility: HOSPITAL | Age: 65
End: 2020-03-24
Attending: STUDENT IN AN ORGANIZED HEALTH CARE EDUCATION/TRAINING PROGRAM
Payer: COMMERCIAL

## 2020-03-24 ENCOUNTER — HOSPITAL ENCOUNTER (OUTPATIENT)
Dept: RADIOLOGY | Facility: HOSPITAL | Age: 65
Setting detail: RADIATION/ONCOLOGY SERIES
Discharge: HOME | End: 2020-03-24
Attending: STUDENT IN AN ORGANIZED HEALTH CARE EDUCATION/TRAINING PROGRAM | Admitting: STUDENT IN AN ORGANIZED HEALTH CARE EDUCATION/TRAINING PROGRAM
Payer: COMMERCIAL

## 2020-03-24 ENCOUNTER — APPOINTMENT (OUTPATIENT)
Dept: LAB | Facility: HOSPITAL | Age: 65
End: 2020-03-24
Attending: RADIOLOGY
Payer: COMMERCIAL

## 2020-03-24 DIAGNOSIS — C54.1 ENDOMETRIAL CANCER (HCC): ICD-10-CM

## 2020-03-24 PROCEDURE — 77014 HB CT SCAN FOR THERAPY GUIDE: CPT

## 2020-03-24 PROCEDURE — 77386 HB NTSTY MODUL RAD TX DLVR CPLX: CPT | Performed by: STUDENT IN AN ORGANIZED HEALTH CARE EDUCATION/TRAINING PROGRAM

## 2020-03-25 ENCOUNTER — NUTRITION (OUTPATIENT)
Dept: NUTRITION | Facility: CLINIC | Age: 65
End: 2020-03-25

## 2020-03-25 ENCOUNTER — APPOINTMENT (OUTPATIENT)
Dept: RADIATION ONCOLOGY | Facility: HOSPITAL | Age: 65
End: 2020-03-25
Attending: STUDENT IN AN ORGANIZED HEALTH CARE EDUCATION/TRAINING PROGRAM
Payer: COMMERCIAL

## 2020-03-25 ENCOUNTER — TELEPHONE (OUTPATIENT)
Dept: UROLOGY | Facility: MEDICAL CENTER | Age: 65
End: 2020-03-25

## 2020-03-25 DIAGNOSIS — Z71.3 NUTRITIONAL COUNSELING: Primary | ICD-10-CM

## 2020-03-25 PROCEDURE — 77300 RADIATION THERAPY DOSE PLAN: CPT | Performed by: STUDENT IN AN ORGANIZED HEALTH CARE EDUCATION/TRAINING PROGRAM

## 2020-03-25 PROCEDURE — 77338 DESIGN MLC DEVICE FOR IMRT: CPT | Performed by: STUDENT IN AN ORGANIZED HEALTH CARE EDUCATION/TRAINING PROGRAM

## 2020-03-25 PROCEDURE — 77386 HB NTSTY MODUL RAD TX DLVR CPLX: CPT | Performed by: STUDENT IN AN ORGANIZED HEALTH CARE EDUCATION/TRAINING PROGRAM

## 2020-03-25 PROCEDURE — 77301 RADIOTHERAPY DOSE PLAN IMRT: CPT | Performed by: STUDENT IN AN ORGANIZED HEALTH CARE EDUCATION/TRAINING PROGRAM

## 2020-03-25 NOTE — TELEPHONE ENCOUNTER
Called Tonya back and reviewed Ramiro's suggestions and water intakes as I had suggested before - she understood

## 2020-03-25 NOTE — TELEPHONE ENCOUNTER
Called and spoke with Cy Orta - this am urine was clear  She is on Lovenox and had radiation today  She has had it before and didn't had any blood   When she urinates naturally her urine is clear but in nephrostomy tube and bag is burgundy color  She states that she drinks a lot of water  First came home from radiation urine was an orange color   Please advise

## 2020-03-25 NOTE — PATIENT INSTRUCTIONS
Nutrition Rx & Recommendations:  · Diet: High Calorie, High Protein (for high calorie foods see pages 52-53, and for high protein foods see pages 49-51 in your Eating Hints book)  If having Diarrhea: Low Fiber, High Calorie/Protein Diet  · Small, frequent meals/snacks may be easier to tolerate than 3 large daily meals  Aim for 5-6 small meals per day (every 2-3 hours)  · Include protein at all meals/snacks  · Stay hydrated by sipping fluids of choice/tolerance throughout the day  · For diarrhea: drink plenty of fluids (>64 oz/day), avoid carbonation; eat 5-6 small meals/day; include high sodium & potassium foods/liquids (Sodium: soup/broth; Potassium: bananas, canned apricots, potatoes); eat low-fiber foods; choose foods/liquids that are room temperature; avoid foods/drinks that can make diarrhea worse (ex  high fiber, high sugar, hot/cold drinks, greasy/fatty foods, gas forming foods such as beans, raw produce, milk products, alcohol, spicy foods, caffeine, sugar alcohols (xylitol, sorbitol), and apple juice (high in sorbitol) (see pages 15-16 in your Eating Hints book)  If diarrhea is severe, consider adding Banatrol (banana flakes) 1-3 times per day mixed in applesauce (can be purchased online from [address] [place] [address])  · High protein foods to try: [place] yogurt with fruit, trail mix, oral nutrition supplements, eggs, meats, poultry, fish, cheese sticks, pudding, peanut butter, nuts, rolled up chicken/turkey breast, hummus (see pages 49-51 in your Eating Hints book)   · Fluid Goal: 63-72 oz per day, drink more fluid if diarrhea occurs    · Fluid options for diarrhea: sugar-free sports drinks, broth, sugar-free gelatin, water, diluted juice  · If you experience diarrhea, follow a low-fiber diet, 5-6 small meals per day; review Diarrhea and Low Fiber Diet handouts  · Choose foods with <3 grams of fiber per serving (read food label)  · You may benefit from reducing milk products (lactose) when diarrhea is severe, you may try a low-lactose diet for a few days to see if it helps    · Continue oral probiotic and Greek yogurt  · Lifetime vitamin/mineral supplementation recommended for Gastric Bypass:  · Multivitamin BID  · Iron 45 mg daily  · Vitamin D daily  · Calcium Citrate 500 mg TID (do not take with iron, needs to be 2 hrs apart from iron)  · B12 1000 mcg daily      Follow Up Plan: PRN for symptom management and cancer preventative long-term eating pattern once tx is finished   Recommend Referral to Other Providers: none at this time

## 2020-03-25 NOTE — TELEPHONE ENCOUNTER
Patient of Dr Priyank Contreras seen in [place] office  Patient states her nephrostomy tube is filling with blood  Please advise

## 2020-03-26 ENCOUNTER — APPOINTMENT (OUTPATIENT)
Dept: RADIATION ONCOLOGY | Facility: HOSPITAL | Age: 65
End: 2020-03-26
Attending: STUDENT IN AN ORGANIZED HEALTH CARE EDUCATION/TRAINING PROGRAM
Payer: COMMERCIAL

## 2020-03-26 PROCEDURE — 77386 HB NTSTY MODUL RAD TX DLVR CPLX: CPT | Performed by: STUDENT IN AN ORGANIZED HEALTH CARE EDUCATION/TRAINING PROGRAM

## 2020-03-27 ENCOUNTER — APPOINTMENT (OUTPATIENT)
Dept: RADIATION ONCOLOGY | Facility: HOSPITAL | Age: 65
End: 2020-03-27
Attending: STUDENT IN AN ORGANIZED HEALTH CARE EDUCATION/TRAINING PROGRAM
Payer: COMMERCIAL

## 2020-03-27 PROCEDURE — 77386 HB NTSTY MODUL RAD TX DLVR CPLX: CPT | Performed by: STUDENT IN AN ORGANIZED HEALTH CARE EDUCATION/TRAINING PROGRAM

## 2020-03-30 ENCOUNTER — APPOINTMENT (OUTPATIENT)
Dept: RADIATION ONCOLOGY | Facility: HOSPITAL | Age: 65
End: 2020-03-30
Attending: STUDENT IN AN ORGANIZED HEALTH CARE EDUCATION/TRAINING PROGRAM
Payer: COMMERCIAL

## 2020-03-30 PROCEDURE — 77336 RADIATION PHYSICS CONSULT: CPT | Performed by: STUDENT IN AN ORGANIZED HEALTH CARE EDUCATION/TRAINING PROGRAM

## 2020-03-30 PROCEDURE — 77386 HB NTSTY MODUL RAD TX DLVR CPLX: CPT | Performed by: STUDENT IN AN ORGANIZED HEALTH CARE EDUCATION/TRAINING PROGRAM

## 2020-03-31 ENCOUNTER — APPOINTMENT (OUTPATIENT)
Dept: RADIATION ONCOLOGY | Facility: HOSPITAL | Age: 65
End: 2020-03-31
Attending: STUDENT IN AN ORGANIZED HEALTH CARE EDUCATION/TRAINING PROGRAM
Payer: COMMERCIAL

## 2020-03-31 PROCEDURE — 77386 HB NTSTY MODUL RAD TX DLVR CPLX: CPT | Performed by: STUDENT IN AN ORGANIZED HEALTH CARE EDUCATION/TRAINING PROGRAM

## 2020-04-01 ENCOUNTER — APPOINTMENT (OUTPATIENT)
Dept: LAB | Facility: HOSPITAL | Age: 65
End: 2020-04-01
Attending: RADIOLOGY
Payer: COMMERCIAL

## 2020-04-01 ENCOUNTER — APPOINTMENT (OUTPATIENT)
Dept: RADIATION ONCOLOGY | Facility: HOSPITAL | Age: 65
End: 2020-04-01
Attending: STUDENT IN AN ORGANIZED HEALTH CARE EDUCATION/TRAINING PROGRAM
Payer: COMMERCIAL

## 2020-04-01 PROCEDURE — 77386 HB NTSTY MODUL RAD TX DLVR CPLX: CPT | Performed by: STUDENT IN AN ORGANIZED HEALTH CARE EDUCATION/TRAINING PROGRAM

## 2020-04-02 ENCOUNTER — APPOINTMENT (OUTPATIENT)
Dept: RADIATION ONCOLOGY | Facility: HOSPITAL | Age: 65
End: 2020-04-02
Attending: STUDENT IN AN ORGANIZED HEALTH CARE EDUCATION/TRAINING PROGRAM
Payer: COMMERCIAL

## 2020-04-02 PROCEDURE — 77386 HB NTSTY MODUL RAD TX DLVR CPLX: CPT | Performed by: STUDENT IN AN ORGANIZED HEALTH CARE EDUCATION/TRAINING PROGRAM

## 2020-04-03 ENCOUNTER — TELEMEDICINE (OUTPATIENT)
Dept: UROLOGY | Facility: AMBULATORY SURGERY CENTER | Age: 65
End: 2020-04-03
Payer: COMMERCIAL

## 2020-04-03 ENCOUNTER — APPOINTMENT (OUTPATIENT)
Dept: RADIATION ONCOLOGY | Facility: HOSPITAL | Age: 65
End: 2020-04-03
Attending: STUDENT IN AN ORGANIZED HEALTH CARE EDUCATION/TRAINING PROGRAM
Payer: COMMERCIAL

## 2020-04-03 DIAGNOSIS — N39.46 MIXED STRESS AND URGE URINARY INCONTINENCE: ICD-10-CM

## 2020-04-03 DIAGNOSIS — Z87.440 HISTORY OF UTI: ICD-10-CM

## 2020-04-03 DIAGNOSIS — C54.1 ENDOMETRIAL CANCER (HCC): Primary | Chronic | ICD-10-CM

## 2020-04-03 DIAGNOSIS — N13.5 OBSTRUCTION OF RIGHT URETER: ICD-10-CM

## 2020-04-03 PROCEDURE — 99213 OFFICE O/P EST LOW 20 MIN: CPT | Performed by: NURSE PRACTITIONER

## 2020-04-03 PROCEDURE — 77386 HB NTSTY MODUL RAD TX DLVR CPLX: CPT | Performed by: STUDENT IN AN ORGANIZED HEALTH CARE EDUCATION/TRAINING PROGRAM

## 2020-04-03 RX ORDER — OXYBUTYNIN CHLORIDE 10 MG/1
10 TABLET, EXTENDED RELEASE ORAL DAILY
Qty: 30 TABLET | Refills: 3 | Status: SHIPPED | OUTPATIENT
Start: 2020-04-03 | End: 2020-06-29

## 2020-04-05 NOTE — PROGRESS NOTES
Patient to infusion center for lab draw/PICC   Patient offers no complaints  JOSE Dual Lumen PICC line noted  Site intact, excellent blood return noted  Labs drawn as per MD order  PICC line flushed as per protocol  Patient's next appointment confirmed  AVS printed and given to patient  25

## 2020-04-06 ENCOUNTER — APPOINTMENT (OUTPATIENT)
Dept: RADIATION ONCOLOGY | Facility: HOSPITAL | Age: 65
End: 2020-04-06
Attending: STUDENT IN AN ORGANIZED HEALTH CARE EDUCATION/TRAINING PROGRAM
Payer: COMMERCIAL

## 2020-04-06 PROCEDURE — 77386 HB NTSTY MODUL RAD TX DLVR CPLX: CPT | Performed by: STUDENT IN AN ORGANIZED HEALTH CARE EDUCATION/TRAINING PROGRAM

## 2020-04-06 PROCEDURE — 77336 RADIATION PHYSICS CONSULT: CPT | Performed by: STUDENT IN AN ORGANIZED HEALTH CARE EDUCATION/TRAINING PROGRAM

## 2020-04-07 ENCOUNTER — APPOINTMENT (OUTPATIENT)
Dept: RADIATION ONCOLOGY | Facility: HOSPITAL | Age: 65
End: 2020-04-07
Attending: STUDENT IN AN ORGANIZED HEALTH CARE EDUCATION/TRAINING PROGRAM
Payer: COMMERCIAL

## 2020-04-07 PROCEDURE — 77386 HB NTSTY MODUL RAD TX DLVR CPLX: CPT | Performed by: STUDENT IN AN ORGANIZED HEALTH CARE EDUCATION/TRAINING PROGRAM

## 2020-04-08 ENCOUNTER — APPOINTMENT (OUTPATIENT)
Dept: RADIATION ONCOLOGY | Facility: HOSPITAL | Age: 65
End: 2020-04-08
Attending: STUDENT IN AN ORGANIZED HEALTH CARE EDUCATION/TRAINING PROGRAM
Payer: COMMERCIAL

## 2020-04-08 ENCOUNTER — TRANSCRIBE ORDERS (OUTPATIENT)
Dept: LAB | Facility: HOSPITAL | Age: 65
End: 2020-04-08

## 2020-04-08 ENCOUNTER — APPOINTMENT (OUTPATIENT)
Dept: LAB | Facility: HOSPITAL | Age: 65
End: 2020-04-08
Attending: RADIOLOGY
Payer: COMMERCIAL

## 2020-04-08 DIAGNOSIS — C54.1 ENDOMETRIUM CANCER (HCC): ICD-10-CM

## 2020-04-08 DIAGNOSIS — C54.1 ENDOMETRIUM CANCER (HCC): Primary | ICD-10-CM

## 2020-04-08 LAB
BASOPHILS # BLD AUTO: 0.04 THOUSANDS/ΜL (ref 0–0.1)
BASOPHILS NFR BLD AUTO: 1 % (ref 0–1)
EOSINOPHIL # BLD AUTO: 0.01 THOUSAND/ΜL (ref 0–0.61)
EOSINOPHIL NFR BLD AUTO: 0 % (ref 0–6)
ERYTHROCYTE [DISTWIDTH] IN BLOOD BY AUTOMATED COUNT: 13.5 % (ref 11.6–15.1)
HCT VFR BLD AUTO: 28.8 % (ref 34.8–46.1)
HGB BLD-MCNC: 9.2 G/DL (ref 11.5–15.4)
IMM GRANULOCYTES # BLD AUTO: 0.1 THOUSAND/UL (ref 0–0.2)
IMM GRANULOCYTES NFR BLD AUTO: 2 % (ref 0–2)
LYMPHOCYTES # BLD AUTO: 0.5 THOUSANDS/ΜL (ref 0.6–4.47)
LYMPHOCYTES NFR BLD AUTO: 8 % (ref 14–44)
MCH RBC QN AUTO: 31 PG (ref 26.8–34.3)
MCHC RBC AUTO-ENTMCNC: 31.9 G/DL (ref 31.4–37.4)
MCV RBC AUTO: 97 FL (ref 82–98)
MONOCYTES # BLD AUTO: 0.72 THOUSAND/ΜL (ref 0.17–1.22)
MONOCYTES NFR BLD AUTO: 12 % (ref 4–12)
NEUTROPHILS # BLD AUTO: 4.56 THOUSANDS/ΜL (ref 1.85–7.62)
NEUTS SEG NFR BLD AUTO: 77 % (ref 43–75)
NRBC BLD AUTO-RTO: 0 /100 WBCS
PLATELET # BLD AUTO: 310 THOUSANDS/UL (ref 149–390)
PMV BLD AUTO: 9.5 FL (ref 8.9–12.7)
RBC # BLD AUTO: 2.97 MILLION/UL (ref 3.81–5.12)
WBC # BLD AUTO: 5.93 THOUSAND/UL (ref 4.31–10.16)

## 2020-04-08 PROCEDURE — 85025 COMPLETE CBC W/AUTO DIFF WBC: CPT

## 2020-04-08 PROCEDURE — 77386 HB NTSTY MODUL RAD TX DLVR CPLX: CPT | Performed by: STUDENT IN AN ORGANIZED HEALTH CARE EDUCATION/TRAINING PROGRAM

## 2020-04-08 PROCEDURE — 77417 THER RADIOLOGY PORT IMAGE(S): CPT | Performed by: STUDENT IN AN ORGANIZED HEALTH CARE EDUCATION/TRAINING PROGRAM

## 2020-04-08 PROCEDURE — 36415 COLL VENOUS BLD VENIPUNCTURE: CPT

## 2020-04-09 ENCOUNTER — APPOINTMENT (OUTPATIENT)
Dept: RADIATION ONCOLOGY | Facility: HOSPITAL | Age: 65
End: 2020-04-09
Attending: STUDENT IN AN ORGANIZED HEALTH CARE EDUCATION/TRAINING PROGRAM
Payer: COMMERCIAL

## 2020-04-09 PROCEDURE — 77386 HB NTSTY MODUL RAD TX DLVR CPLX: CPT | Performed by: STUDENT IN AN ORGANIZED HEALTH CARE EDUCATION/TRAINING PROGRAM

## 2020-04-10 ENCOUNTER — APPOINTMENT (OUTPATIENT)
Dept: RADIATION ONCOLOGY | Facility: HOSPITAL | Age: 65
End: 2020-04-10
Attending: STUDENT IN AN ORGANIZED HEALTH CARE EDUCATION/TRAINING PROGRAM
Payer: COMMERCIAL

## 2020-04-10 PROCEDURE — 77386 HB NTSTY MODUL RAD TX DLVR CPLX: CPT | Performed by: STUDENT IN AN ORGANIZED HEALTH CARE EDUCATION/TRAINING PROGRAM

## 2020-04-13 ENCOUNTER — APPOINTMENT (OUTPATIENT)
Dept: RADIATION ONCOLOGY | Facility: HOSPITAL | Age: 65
End: 2020-04-13
Attending: STUDENT IN AN ORGANIZED HEALTH CARE EDUCATION/TRAINING PROGRAM
Payer: COMMERCIAL

## 2020-04-13 PROCEDURE — 77336 RADIATION PHYSICS CONSULT: CPT | Performed by: STUDENT IN AN ORGANIZED HEALTH CARE EDUCATION/TRAINING PROGRAM

## 2020-04-13 PROCEDURE — 77386 HB NTSTY MODUL RAD TX DLVR CPLX: CPT | Performed by: STUDENT IN AN ORGANIZED HEALTH CARE EDUCATION/TRAINING PROGRAM

## 2020-04-14 ENCOUNTER — TELEPHONE (OUTPATIENT)
Dept: UROLOGY | Facility: AMBULATORY SURGERY CENTER | Age: 65
End: 2020-04-14

## 2020-04-14 DIAGNOSIS — R30.0 DYSURIA: Primary | ICD-10-CM

## 2020-04-15 ENCOUNTER — TRANSCRIBE ORDERS (OUTPATIENT)
Dept: LAB | Facility: HOSPITAL | Age: 65
End: 2020-04-15

## 2020-04-15 ENCOUNTER — APPOINTMENT (OUTPATIENT)
Dept: LAB | Age: 65
End: 2020-04-15
Payer: COMMERCIAL

## 2020-04-15 DIAGNOSIS — R30.0 DYSURIA: ICD-10-CM

## 2020-04-15 DIAGNOSIS — R30.0 DYSURIA: Primary | ICD-10-CM

## 2020-04-15 LAB
BACTERIA UR QL AUTO: ABNORMAL /HPF
BILIRUB UR QL STRIP: NEGATIVE
CLARITY UR: ABNORMAL
COLOR UR: YELLOW
GLUCOSE UR STRIP-MCNC: NEGATIVE MG/DL
HGB UR QL STRIP.AUTO: ABNORMAL
HYALINE CASTS #/AREA URNS LPF: ABNORMAL /LPF
KETONES UR STRIP-MCNC: NEGATIVE MG/DL
LEUKOCYTE ESTERASE UR QL STRIP: ABNORMAL
NITRITE UR QL STRIP: NEGATIVE
NON-SQ EPI CELLS URNS QL MICRO: ABNORMAL /HPF
PH UR STRIP.AUTO: 6.5 [PH]
PROT UR STRIP-MCNC: ABNORMAL MG/DL
RBC #/AREA URNS AUTO: ABNORMAL /HPF
SP GR UR STRIP.AUTO: 1.01 (ref 1–1.03)
UROBILINOGEN UR QL STRIP.AUTO: 0.2 E.U./DL
WBC #/AREA URNS AUTO: ABNORMAL /HPF

## 2020-04-15 PROCEDURE — 81001 URINALYSIS AUTO W/SCOPE: CPT

## 2020-04-15 PROCEDURE — 87086 URINE CULTURE/COLONY COUNT: CPT

## 2020-04-16 LAB — BACTERIA UR CULT: NORMAL

## 2020-04-20 DIAGNOSIS — I82.4Y9 ACUTE DEEP VEIN THROMBOSIS (DVT) OF PROXIMAL VEIN OF LOWER EXTREMITY, UNSPECIFIED LATERALITY (HCC): ICD-10-CM

## 2020-04-29 ENCOUNTER — TELEPHONE (OUTPATIENT)
Dept: NUTRITION | Facility: CLINIC | Age: 65
End: 2020-04-29

## 2020-04-30 ENCOUNTER — HOSPITAL ENCOUNTER (OUTPATIENT)
Dept: RADIOLOGY | Facility: HOSPITAL | Age: 65
Discharge: HOME | End: 2020-04-30
Attending: UROLOGY
Payer: COMMERCIAL

## 2020-04-30 VITALS — TEMPERATURE: 97.6 F

## 2020-04-30 DIAGNOSIS — N13.30 HYDRONEPHROSIS: ICD-10-CM

## 2020-04-30 PROCEDURE — C1769 GUIDE WIRE: HCPCS

## 2020-04-30 PROCEDURE — 50387 CHANGE NEPHROURETERAL CATH: CPT

## 2020-04-30 PROCEDURE — 50387 CHANGE NEPHROURETERAL CATH: CPT | Performed by: RADIOLOGY

## 2020-04-30 RX ADMIN — IOHEXOL 15 ML: 350 INJECTION, SOLUTION INTRAVENOUS at 11:11

## 2020-05-06 ENCOUNTER — OFFICE VISIT (OUTPATIENT)
Dept: GYNECOLOGIC ONCOLOGY | Facility: CLINIC | Age: 65
End: 2020-05-06
Payer: COMMERCIAL

## 2020-05-06 VITALS
BODY MASS INDEX: 37.29 KG/M2 | HEIGHT: 59 IN | SYSTOLIC BLOOD PRESSURE: 140 MMHG | DIASTOLIC BLOOD PRESSURE: 78 MMHG | TEMPERATURE: 98.6 F | WEIGHT: 185 LBS | HEART RATE: 85 BPM

## 2020-05-06 DIAGNOSIS — I82.4Y9 ACUTE DEEP VEIN THROMBOSIS (DVT) OF PROXIMAL VEIN OF LOWER EXTREMITY, UNSPECIFIED LATERALITY (HCC): ICD-10-CM

## 2020-05-06 DIAGNOSIS — C54.1 ENDOMETRIAL CANCER (HCC): Primary | ICD-10-CM

## 2020-05-06 DIAGNOSIS — R94.8 ABNORMAL PET SCAN OF RETROPERITONEUM: ICD-10-CM

## 2020-05-06 DIAGNOSIS — N13.5 OBSTRUCTION OF RIGHT URETER: ICD-10-CM

## 2020-05-06 DIAGNOSIS — C54.1 ENDOMETRIAL CANCER (HCC): Primary | Chronic | ICD-10-CM

## 2020-05-06 PROCEDURE — 1036F TOBACCO NON-USER: CPT | Performed by: OBSTETRICS & GYNECOLOGY

## 2020-05-06 PROCEDURE — 3077F SYST BP >= 140 MM HG: CPT | Performed by: OBSTETRICS & GYNECOLOGY

## 2020-05-06 PROCEDURE — 3078F DIAST BP <80 MM HG: CPT | Performed by: OBSTETRICS & GYNECOLOGY

## 2020-05-06 PROCEDURE — 99214 OFFICE O/P EST MOD 30 MIN: CPT | Performed by: OBSTETRICS & GYNECOLOGY

## 2020-05-06 PROCEDURE — 3008F BODY MASS INDEX DOCD: CPT | Performed by: OBSTETRICS & GYNECOLOGY

## 2020-05-12 ENCOUNTER — RADIATION ONCOLOGY FOLLOW-UP (OUTPATIENT)
Dept: RADIATION ONCOLOGY | Facility: HOSPITAL | Age: 65
End: 2020-05-12
Attending: RADIOLOGY

## 2020-05-12 DIAGNOSIS — C54.1 ENDOMETRIAL CANCER (HCC): Primary | ICD-10-CM

## 2020-05-18 ENCOUNTER — HOSPITAL ENCOUNTER (OUTPATIENT)
Dept: RADIOLOGY | Age: 65
Discharge: HOME | End: 2020-05-18
Payer: COMMERCIAL

## 2020-05-18 DIAGNOSIS — C54.1 ENDOMETRIAL CANCER (HCC): ICD-10-CM

## 2020-05-18 PROCEDURE — 71260 CT THORAX DX C+: CPT

## 2020-05-18 PROCEDURE — 74177 CT ABD & PELVIS W/CONTRAST: CPT

## 2020-05-18 RX ADMIN — IOHEXOL 100 ML: 350 INJECTION, SOLUTION INTRAVENOUS at 14:27

## 2020-05-19 ENCOUNTER — NUTRITION (OUTPATIENT)
Dept: NUTRITION | Facility: CLINIC | Age: 65
End: 2020-05-19

## 2020-05-19 DIAGNOSIS — Z71.3 NUTRITIONAL COUNSELING: Primary | ICD-10-CM

## 2020-05-26 DIAGNOSIS — R94.8 ABNORMAL PET SCAN OF RETROPERITONEUM: ICD-10-CM

## 2020-05-26 DIAGNOSIS — R19.00 PELVIC MASS: ICD-10-CM

## 2020-05-26 DIAGNOSIS — C54.1 ENDOMETRIAL CANCER (HCC): Primary | ICD-10-CM

## 2020-06-08 ENCOUNTER — OFFICE VISIT (OUTPATIENT)
Dept: FAMILY MEDICINE CLINIC | Facility: CLINIC | Age: 65
End: 2020-06-08
Payer: COMMERCIAL

## 2020-06-08 VITALS
HEIGHT: 59 IN | BODY MASS INDEX: 39.31 KG/M2 | TEMPERATURE: 97.2 F | DIASTOLIC BLOOD PRESSURE: 68 MMHG | WEIGHT: 195 LBS | SYSTOLIC BLOOD PRESSURE: 108 MMHG | HEART RATE: 78 BPM | OXYGEN SATURATION: 97 %

## 2020-06-08 DIAGNOSIS — M54.50 LEFT LOW BACK PAIN, UNSPECIFIED CHRONICITY, UNSPECIFIED WHETHER SCIATICA PRESENT: Primary | ICD-10-CM

## 2020-06-08 DIAGNOSIS — F32.9 MAJOR DEPRESSION, CHRONIC: ICD-10-CM

## 2020-06-08 DIAGNOSIS — C54.1 ENDOMETRIAL CANCER (HCC): Chronic | ICD-10-CM

## 2020-06-08 DIAGNOSIS — D50.8 OTHER IRON DEFICIENCY ANEMIA: ICD-10-CM

## 2020-06-08 DIAGNOSIS — I10 BENIGN ESSENTIAL HYPERTENSION: ICD-10-CM

## 2020-06-08 DIAGNOSIS — E78.5 DYSLIPIDEMIA: ICD-10-CM

## 2020-06-08 PROCEDURE — 99214 OFFICE O/P EST MOD 30 MIN: CPT | Performed by: INTERNAL MEDICINE

## 2020-06-08 PROCEDURE — 1036F TOBACCO NON-USER: CPT | Performed by: INTERNAL MEDICINE

## 2020-06-08 PROCEDURE — 3078F DIAST BP <80 MM HG: CPT | Performed by: INTERNAL MEDICINE

## 2020-06-08 PROCEDURE — 3074F SYST BP LT 130 MM HG: CPT | Performed by: INTERNAL MEDICINE

## 2020-06-08 RX ORDER — TRAMADOL HYDROCHLORIDE 50 MG/1
50 TABLET ORAL EVERY 6 HOURS PRN
Qty: 30 TABLET | Refills: 0 | Status: SHIPPED | OUTPATIENT
Start: 2020-06-08 | End: 2020-06-17 | Stop reason: SDUPTHER

## 2020-06-09 ENCOUNTER — APPOINTMENT (OUTPATIENT)
Dept: RADIOLOGY | Facility: MEDICAL CENTER | Age: 65
End: 2020-06-09
Payer: COMMERCIAL

## 2020-06-09 DIAGNOSIS — M54.50 LEFT LOW BACK PAIN, UNSPECIFIED CHRONICITY, UNSPECIFIED WHETHER SCIATICA PRESENT: ICD-10-CM

## 2020-06-09 PROCEDURE — 72110 X-RAY EXAM L-2 SPINE 4/>VWS: CPT

## 2020-06-17 ENCOUNTER — EVALUATION (OUTPATIENT)
Dept: PHYSICAL THERAPY | Facility: REHABILITATION | Age: 65
End: 2020-06-17
Payer: COMMERCIAL

## 2020-06-17 DIAGNOSIS — C54.1 ENDOMETRIAL CANCER (HCC): Primary | ICD-10-CM

## 2020-06-17 DIAGNOSIS — G89.29 CHRONIC BILATERAL LOW BACK PAIN WITHOUT SCIATICA: ICD-10-CM

## 2020-06-17 DIAGNOSIS — M54.50 LEFT LOW BACK PAIN, UNSPECIFIED CHRONICITY, UNSPECIFIED WHETHER SCIATICA PRESENT: ICD-10-CM

## 2020-06-17 DIAGNOSIS — R53.1 GENERALIZED WEAKNESS: ICD-10-CM

## 2020-06-17 DIAGNOSIS — M54.50 CHRONIC BILATERAL LOW BACK PAIN WITHOUT SCIATICA: ICD-10-CM

## 2020-06-17 PROCEDURE — 97162 PT EVAL MOD COMPLEX 30 MIN: CPT | Performed by: PHYSICAL THERAPIST

## 2020-06-18 RX ORDER — TRAMADOL HYDROCHLORIDE 50 MG/1
50 TABLET ORAL EVERY 6 HOURS PRN
Qty: 30 TABLET | Refills: 0 | Status: SHIPPED | OUTPATIENT
Start: 2020-06-18 | End: 2020-06-30 | Stop reason: SDUPTHER

## 2020-06-23 ENCOUNTER — OFFICE VISIT (OUTPATIENT)
Dept: PHYSICAL THERAPY | Facility: REHABILITATION | Age: 65
End: 2020-06-23
Payer: COMMERCIAL

## 2020-06-23 DIAGNOSIS — C54.1 ENDOMETRIAL CANCER (HCC): Primary | ICD-10-CM

## 2020-06-23 DIAGNOSIS — R53.1 GENERALIZED WEAKNESS: ICD-10-CM

## 2020-06-23 DIAGNOSIS — M54.50 CHRONIC BILATERAL LOW BACK PAIN WITHOUT SCIATICA: ICD-10-CM

## 2020-06-23 DIAGNOSIS — G89.29 CHRONIC BILATERAL LOW BACK PAIN WITHOUT SCIATICA: ICD-10-CM

## 2020-06-23 PROCEDURE — 97110 THERAPEUTIC EXERCISES: CPT | Performed by: PHYSICAL THERAPIST

## 2020-06-23 PROCEDURE — 97112 NEUROMUSCULAR REEDUCATION: CPT | Performed by: PHYSICAL THERAPIST

## 2020-06-25 ENCOUNTER — OFFICE VISIT (OUTPATIENT)
Dept: PHYSICAL THERAPY | Facility: REHABILITATION | Age: 65
End: 2020-06-25
Payer: COMMERCIAL

## 2020-06-25 DIAGNOSIS — G89.29 CHRONIC BILATERAL LOW BACK PAIN WITHOUT SCIATICA: ICD-10-CM

## 2020-06-25 DIAGNOSIS — M54.50 CHRONIC BILATERAL LOW BACK PAIN WITHOUT SCIATICA: ICD-10-CM

## 2020-06-25 DIAGNOSIS — C54.1 ENDOMETRIAL CANCER (HCC): Primary | ICD-10-CM

## 2020-06-25 DIAGNOSIS — R53.1 GENERALIZED WEAKNESS: ICD-10-CM

## 2020-06-25 PROCEDURE — 99283 EMERGENCY DEPT VISIT LOW MDM: CPT | Performed by: PHYSICIAN ASSISTANT

## 2020-06-25 PROCEDURE — 97140 MANUAL THERAPY 1/> REGIONS: CPT | Performed by: PHYSICAL THERAPIST

## 2020-06-25 PROCEDURE — 97110 THERAPEUTIC EXERCISES: CPT | Performed by: PHYSICAL THERAPIST

## 2020-06-25 PROCEDURE — 97112 NEUROMUSCULAR REEDUCATION: CPT | Performed by: PHYSICAL THERAPIST

## 2020-06-26 ENCOUNTER — HOSPITAL ENCOUNTER (EMERGENCY)
Facility: HOSPITAL | Age: 65
Discharge: HOME | End: 2020-06-26
Attending: EMERGENCY MEDICINE | Admitting: EMERGENCY MEDICINE
Payer: COMMERCIAL

## 2020-06-26 VITALS
TEMPERATURE: 98 F | RESPIRATION RATE: 18 BRPM | DIASTOLIC BLOOD PRESSURE: 57 MMHG | SYSTOLIC BLOOD PRESSURE: 111 MMHG | HEART RATE: 82 BPM | OXYGEN SATURATION: 97 %

## 2020-06-26 DIAGNOSIS — T83.032A: Primary | ICD-10-CM

## 2020-06-26 PROCEDURE — 99283 EMERGENCY DEPT VISIT LOW MDM: CPT

## 2020-06-27 NOTE — ED PROVIDER NOTES
History  Chief Complaint   Patient presents with    Medical Problem     pt c/o nephrostomy tube leaking  no other complaints at this time     Patient is a 82-year-old female with history of right nephrostomy tube, endometrial cancer, hyperlipidemia, hypertension, gastric bypass, cholecystectomy, and hysterectomy that presents emergency department for placement of currently leaking urine bag attached to nephrostomy tube for 1 day  Patient states that she had noticed that her urinary bag that was connected to her right nephrostomy tube comes emergency department for replacement  Patient denies palliative in provocative factors  Patient denies not effective treatment  Patient denies fevers, chills, nausea, vomiting, diarrhea, constipation urinary symptoms  Patient denies recent fall or recent trauma  Patient sick contacts or travel  Patient denies blood in urine  Patient denies urine sediment  Patient denies recent antibiotic use  Patient denies chest pain, shortness of breath, and abdominal pain  History provided by:  Patient   used: No        Prior to Admission Medications   Prescriptions Last Dose Informant Patient Reported? Taking?    ARIPiprazole (ABILIFY) 10 mg tablet  Self Yes No   Sig: Take 10 mg by mouth daily   MYRBETRIQ 50 MG TB24  Self No No   Sig: TAKE 1 TABLET BY MOUTH EVERY DAY   acetaminophen (TYLENOL) 325 mg tablet  Self No No   Sig: Take 2 tablets (650 mg total) by mouth every 6 (six) hours as needed for mild pain, headaches or fever   aspirin (ECOTRIN LOW STRENGTH) 81 mg EC tablet  Self Yes No   Sig: Take 81 mg by mouth daily   cholecalciferol (VITAMIN D3) 1,000 units tablet  Self Yes No   Sig: Take 1,000 Units by mouth daily   enoxaparin (Lovenox) 120 mg/0 8 mL   No No   Sig: Inject 0 8 mL (120 mg total) under the skin daily   folic acid (FOLVITE) 1 mg tablet  Self No No   Sig: Take 1 tablet (1 mg total) by mouth daily   gemfibrozil (LOPID) 600 mg tablet   No No Sig: TAKE 1 TABLET BY MOUTH EVERY DAY   hydrOXYzine HCL (ATARAX) 25 mg tablet  Self No No   Sig: Take 1 tablet (25 mg total) by mouth every 6 (six) hours as needed for itching   omeprazole (PriLOSEC) 20 mg delayed release capsule  Self Yes No   Sig: Take 20 mg by mouth daily   ondansetron (ZOFRAN) 8 mg tablet  Self No No   Sig: Take 1 tablet (8 mg total) by mouth every 8 (eight) hours as needed for nausea or vomiting   oxybutynin (DITROPAN-XL) 10 MG 24 hr tablet   No No   Sig: Take 1 tablet (10 mg total) by mouth daily   quinapril (ACCUPRIL) 5 mg tablet   No No   Sig: TAKE 1 TABLET BY MOUTH EVERYDAY AT BEDTIME   saccharomyces boulardii (FLORASTOR) 250 mg capsule  Self No No   Sig: Take 1 capsule (250 mg total) by mouth 2 (two) times a day   traMADol (ULTRAM) 50 mg tablet   No No   Sig: Take 1 tablet (50 mg total) by mouth every 6 (six) hours as needed for moderate pain   venlafaxine (EFFEXOR) 75 mg tablet  Self Yes No   Sig: Take 75 mg by mouth 3 (three) times a day        Facility-Administered Medications: None       Past Medical History:   Diagnosis Date    Anemia     Chemotherapy follow-up examination     Depression     [facility]    Hyperglycemia     vx type 2 dm -- last assessed 4/1/14; resolved 11/7/17    Hyperlipidemia     Hypertension     Obesity     last assessed 10/14/17; resolved 11/7/17       Past Surgical History:   Procedure Laterality Date    ABDOMINAL SURGERY      GASTRIC BYPASS    CHOLECYSTECTOMY      at the time of gastric bypass    COLONOSCOPY      CT NEEDLE BIOPSY LYMPH NODE  7/8/2019    FL GUIDED CENTRAL VENOUS ACCESS DEVICE INSERTION  11/12/2019    GASTRIC BYPASS      HYSTERECTOMY Bilateral     total abdominal with salpingo-oophorectomy    IR PICC LINE  9/27/2019    IR PORT PLACEMENT  7/26/2019    IR PORT REMOVAL  9/20/2019    IR TUBE PLACEMENT NEPHROSTOMY  7/26/2019    OOPHORECTOMY Bilateral     [place] INSJ TUNNELED CTR VAD W/SUBQ PORT AGE 5 YR/> Left 11/12/2019    Procedure: INSERTION VENOUS PORT ( PORT-A-CATH) IR;  Surgeon: Erin Griffin DO;  [facility];  Service: Interventional Radiology    SC LAP, RADICAL HYST W/ TUBE&OV, NODE BX N/A 12/19/2017    Procedure: HYSTERECTOMY LAPAROSCOPIC TOTAL ([address]) W/ ROBOTICS; BILATERAL SALPINGOOPHERECTOMY; LYMPH NODE DISSECTION; lysis of adhesions;  Surgeon: Conor Britton MD;  [facility];  Service: Gynecology Oncology    SC LAP,DIAGNOSTIC ABDOMEN N/A 12/19/2017    Procedure: LAPAROSCOPY DIAGNOSTIC;  Surgeon: Conor Britton MD;  [facility];  Service: Gynecology Oncology    TONSILLECTOMY      US GUIDED BREAST BIOPSY RIGHT COMPLETE Right 6/28/2019       Family History   Problem Relation Age of Onset    Other Mother         dyslipidemia    Ovarian cancer Mother 48    Lymphoma Father     Bone cancer Maternal Grandfather     No Known Problems Sister     No Known Problems Maternal Grandmother     Uterine cancer Paternal Grandmother     No Known Problems Paternal Grandfather     No Known Problems Maternal Aunt     No Known Problems Maternal Aunt     No Known Problems Maternal Aunt     No Known Problems Maternal Aunt     No Known Problems Paternal Aunt     No Known Problems Paternal Aunt     No Known Problems Paternal Aunt     No Known Problems Paternal Aunt     No Known Problems Brother     No Known Problems Brother      I have reviewed and agree with the history as documented  E-Cigarette/Vaping     E-Cigarette/Vaping Substances     Social History     Tobacco Use    Smoking status: Never Smoker    Smokeless tobacco: Never Used   Substance Use Topics    Alcohol use: Not Currently    Drug use: No       Review of Systems   Constitutional: Negative for activity change, appetite change, chills and fever  HENT: Negative for congestion, postnasal drip, rhinorrhea, sinus pressure, sinus pain, sore throat and tinnitus      Eyes: Negative for photophobia and visual disturbance  Respiratory: Negative for cough, chest tightness and shortness of breath  Cardiovascular: Negative for chest pain and palpitations  Gastrointestinal: Negative for abdominal pain, constipation, diarrhea, nausea and vomiting  Genitourinary: Negative for difficulty urinating, dysuria, flank pain, frequency and urgency  Leaking urinary bag connected to nephrostomy tube   Musculoskeletal: Negative for back pain, gait problem, neck pain and neck stiffness  Skin: Negative for pallor and rash  Allergic/Immunologic: Negative for environmental allergies and food allergies  Neurological: Negative for dizziness, weakness, numbness and headaches  Psychiatric/Behavioral: Negative for confusion  All other systems reviewed and are negative  Physical Exam  Physical Exam   Constitutional: She appears well-developed and well-nourished  /57 (BP Location: Right arm)   Pulse 82   Temp 98 °F (36 7 °C) (Oral)   Resp 18   LMP  (LMP Unknown)   SpO2 97%      HENT:   Head: Normocephalic and atraumatic  Right Ear: External ear normal    Left Ear: External ear normal    Nose: Nose normal    Mouth/Throat: Oropharynx is clear and moist    Eyes: Pupils are equal, round, and reactive to light  Conjunctivae and EOM are normal    Neck: Normal range of motion  Cardiovascular: Normal rate and regular rhythm  Pulmonary/Chest: Effort normal    Abdominal: Normal appearance  Musculoskeletal:        Arms:  Skin: Skin is warm  Capillary refill takes less than 2 seconds  Nursing note and vitals reviewed        Vital Signs  ED Triage Vitals   Temperature Pulse Respirations Blood Pressure SpO2   06/26/20 1920 06/26/20 1919 06/26/20 1919 06/26/20 1919 06/26/20 1919   98 °F (36 7 °C) 82 18 111/57 97 %      Temp Source Heart Rate Source Patient Position - Orthostatic VS BP Location FiO2 (%)   06/26/20 1919 06/26/20 1919 06/26/20 1919 06/26/20 1919 --   Oral Monitor Lying Right arm       Pain Score --                  Vitals:    06/26/20 1919   BP: 111/57   Pulse: 82   Patient Position - Orthostatic VS: Lying         Visual Acuity      ED Medications  Medications - No data to display    Diagnostic Studies  Results Reviewed     None                 No orders to display              Procedures  Procedures         ED Course  ED Course as of Jun 27 0729 Fri Jun 26, 2020 2003 Patient denies any pain at this time  Patient states that she presents emergency department this evening due to the fact that the bag to which her current nephrostomy tube; right, is leaking which she noticed this afternoon  Patient states that she only presents emergency department this evening for change in urinary bag attached to nephrostomy tube  MDM  Number of Diagnoses or Management Options  Leakage of nephrostomy catheter, initial encounter [facility]): new and does not require workup     Amount and/or Complexity of Data Reviewed  Review and summarize past medical records: yes  Independent visualization of images, tracings, or specimens: yes    Risk of Complications, Morbidity, and/or Mortality  Presenting problems: minimal  Diagnostic procedures: minimal  Management options: minimal    Patient Progress  Patient progress: stable    Patient is a 61-year-old female with history of right nephrostomy tube, endometrial cancer, hyperlipidemia, hypertension, gastric bypass, cholecystectomy, and hysterectomy that presents emergency department for placement of currently leaking urine bag attached to nephrostomy tube for 1 day      Patient hemodynamically stable and afebrile  ED RN had replaced urinary bag that is connected to nephrostomy tube and will have patient follow-up with Urology for further evaluation as needed  Follow-up with urology as scheduled  Follow-up with PCP  Follow up with emergency department symptoms persist or exacerbate  Patient demonstrates verbal understanding of all discharge instructions, follow-up verbalized agreement with current treatment plan  Disposition  Final diagnoses:   Leakage of nephrostomy catheter, initial encounter ([facility] [place] [facility] ) - catheter bag replaced due to leakage     Time reflects when diagnosis was documented in both MDM as applicable and the Disposition within this note     Time User Action Codes Description Comment    6/26/2020  9:12 PM Laurann Brittle Add [K58 292E] Leakage of nephrostomy catheter, initial encounter ([facility] [place] [facility] [address] )     6/26/2020  9:13 PM Laurann Brittle Modify [T83 [id number]] Leakage of nephrostomy catheter, initial encounter [facility]) catheter bag replaced due to leakage      ED Disposition     ED Disposition Condition Date/Time Comment    Discharge Stable Fri Jun 26, 2020  9:09 PM Liliana Melendez discharge to home/self care              Follow-up Information     Follow up With Specialties Details Why Contact Info Additional [address] PA-C Urology, Physician Assistant Call in 1 week for further evaluation of symptoms [address]       Karuna Gil MD Internal Medicine Call in 1 week for further evaluation of symptoms [address] [address] Emergency Department Emergency Medicine Go to  As needed [address]  [phone] [facility],  [address], [place], [place], [address]          Discharge Medication List as of 6/26/2020  9:14 PM      CONTINUE these medications which have NOT CHANGED    Details   acetaminophen (TYLENOL) 325 mg tablet Take 2 tablets (650 mg total) by mouth every 6 (six) hours as needed for mild pain, headaches or fever, Starting Tue 10/8/2019, Print      ARIPiprazole (ABILIFY) 10 mg tablet Take 10 mg by mouth daily, Historical Med      aspirin (ECOTRIN LOW STRENGTH) 81 mg EC tablet Take 81 mg by mouth daily, Historical Med      cholecalciferol (VITAMIN D3) 1,000 units tablet Take 1,000 Units by mouth daily, Historical Med      enoxaparin (Lovenox) 120 mg/0 8 mL Inject 0 8 mL (120 mg total) under the skin daily, Starting Mon 1/06/6598, Normal      folic acid (FOLVITE) 1 mg tablet Take 1 tablet (1 mg total) by mouth daily, Starting Tue 10/8/2019, Print      gemfibrozil (LOPID) 600 mg tablet TAKE 1 TABLET BY MOUTH EVERY DAY, Normal      hydrOXYzine HCL (ATARAX) 25 mg tablet Take 1 tablet (25 mg total) by mouth every 6 (six) hours as needed for itching, Starting Sun 1/12/2020, Print      MYRBETRIQ 50 MG TB24 TAKE 1 TABLET BY MOUTH EVERY DAY, Normal      omeprazole (PriLOSEC) 20 mg delayed release capsule Take 20 mg by mouth daily, Historical Med      ondansetron (ZOFRAN) 8 mg tablet Take 1 tablet (8 mg total) by mouth every 8 (eight) hours as needed for nausea or vomiting, Starting Wed 7/17/2019, Normal      oxybutynin (DITROPAN-XL) 10 MG 24 hr tablet Take 1 tablet (10 mg total) by mouth daily, Starting Fri 4/3/2020, Until Sun 5/3/2020, Normal      quinapril (ACCUPRIL) 5 mg tablet TAKE 1 TABLET BY MOUTH EVERYDAY AT BEDTIME, Normal      saccharomyces boulardii (FLORASTOR) 250 mg capsule Take 1 capsule (250 mg total) by mouth 2 (two) times a day, Starting Tue 10/8/2019, Print      traMADol (ULTRAM) 50 mg tablet Take 1 tablet (50 mg total) by mouth every 6 (six) hours as needed for moderate pain, Starting u 6/18/2020, Normal      venlafaxine (EFFEXOR) 75 mg tablet Take 75 mg by mouth 3 (three) times a day  , Historical Med           No discharge procedures on file      PDMP Review       Value Time User    PDMP Reviewed  Yes 10/22/2019 12:41 AM Colton López MD          ED Provider  Electronically Signed by           Abilio Dorman PA-C  06/27/20 BRITANY Carter  06/27/20 7424

## 2020-06-27 NOTE — DISCHARGE INSTRUCTIONS
Follow-up with Urology  Follow-up with PCP  Follow up emergency department symptoms persist or exacerbate

## 2020-06-29 DIAGNOSIS — N39.46 MIXED STRESS AND URGE URINARY INCONTINENCE: ICD-10-CM

## 2020-06-29 RX ORDER — OXYBUTYNIN CHLORIDE 10 MG/1
TABLET, EXTENDED RELEASE ORAL
Qty: 30 TABLET | Refills: 3 | Status: SHIPPED | OUTPATIENT
Start: 2020-06-29 | End: 2020-06-30 | Stop reason: SDUPTHER

## 2020-06-30 ENCOUNTER — OFFICE VISIT (OUTPATIENT)
Dept: FAMILY MEDICINE CLINIC | Facility: CLINIC | Age: 65
End: 2020-06-30
Payer: COMMERCIAL

## 2020-06-30 ENCOUNTER — OFFICE VISIT (OUTPATIENT)
Dept: PHYSICAL THERAPY | Facility: REHABILITATION | Age: 65
End: 2020-06-30
Payer: COMMERCIAL

## 2020-06-30 ENCOUNTER — TELEMEDICINE (OUTPATIENT)
Dept: UROLOGY | Facility: AMBULATORY SURGERY CENTER | Age: 65
End: 2020-06-30
Payer: COMMERCIAL

## 2020-06-30 VITALS
TEMPERATURE: 97.2 F | BODY MASS INDEX: 39.47 KG/M2 | HEIGHT: 59 IN | DIASTOLIC BLOOD PRESSURE: 62 MMHG | SYSTOLIC BLOOD PRESSURE: 114 MMHG | OXYGEN SATURATION: 98 % | WEIGHT: 195.8 LBS | HEART RATE: 72 BPM

## 2020-06-30 DIAGNOSIS — R53.1 GENERALIZED WEAKNESS: ICD-10-CM

## 2020-06-30 DIAGNOSIS — M54.50 LOW BACK PAIN WITHOUT SCIATICA, UNSPECIFIED BACK PAIN LATERALITY, UNSPECIFIED CHRONICITY: Primary | ICD-10-CM

## 2020-06-30 DIAGNOSIS — M54.50 LEFT LOW BACK PAIN, UNSPECIFIED CHRONICITY, UNSPECIFIED WHETHER SCIATICA PRESENT: ICD-10-CM

## 2020-06-30 DIAGNOSIS — M54.50 CHRONIC BILATERAL LOW BACK PAIN WITHOUT SCIATICA: ICD-10-CM

## 2020-06-30 DIAGNOSIS — N39.46 MIXED STRESS AND URGE URINARY INCONTINENCE: ICD-10-CM

## 2020-06-30 DIAGNOSIS — C54.1 ENDOMETRIAL CANCER (HCC): Primary | ICD-10-CM

## 2020-06-30 DIAGNOSIS — G89.29 CHRONIC BILATERAL LOW BACK PAIN WITHOUT SCIATICA: ICD-10-CM

## 2020-06-30 PROCEDURE — 3008F BODY MASS INDEX DOCD: CPT | Performed by: INTERNAL MEDICINE

## 2020-06-30 PROCEDURE — 97112 NEUROMUSCULAR REEDUCATION: CPT | Performed by: PHYSICAL THERAPIST

## 2020-06-30 PROCEDURE — 97110 THERAPEUTIC EXERCISES: CPT | Performed by: PHYSICAL THERAPIST

## 2020-06-30 PROCEDURE — 3074F SYST BP LT 130 MM HG: CPT | Performed by: INTERNAL MEDICINE

## 2020-06-30 PROCEDURE — 1036F TOBACCO NON-USER: CPT | Performed by: INTERNAL MEDICINE

## 2020-06-30 PROCEDURE — 99213 OFFICE O/P EST LOW 20 MIN: CPT | Performed by: NURSE PRACTITIONER

## 2020-06-30 PROCEDURE — 99214 OFFICE O/P EST MOD 30 MIN: CPT | Performed by: INTERNAL MEDICINE

## 2020-06-30 PROCEDURE — 3078F DIAST BP <80 MM HG: CPT | Performed by: INTERNAL MEDICINE

## 2020-06-30 PROCEDURE — 97140 MANUAL THERAPY 1/> REGIONS: CPT | Performed by: PHYSICAL THERAPIST

## 2020-06-30 RX ORDER — OXYBUTYNIN CHLORIDE 10 MG/1
10 TABLET, EXTENDED RELEASE ORAL DAILY
Qty: 90 TABLET | Refills: 3 | Status: SHIPPED | OUTPATIENT
Start: 2020-06-30 | End: 2020-08-08 | Stop reason: HOSPADM

## 2020-06-30 RX ORDER — CYCLOBENZAPRINE HCL 10 MG
10 TABLET ORAL
Qty: 30 TABLET | Refills: 1 | Status: SHIPPED | OUTPATIENT
Start: 2020-06-30 | End: 2020-08-08 | Stop reason: HOSPADM

## 2020-06-30 RX ORDER — TRAMADOL HYDROCHLORIDE 50 MG/1
50 TABLET ORAL EVERY 6 HOURS PRN
Qty: 60 TABLET | Refills: 0 | Status: SHIPPED | OUTPATIENT
Start: 2020-06-30 | End: 2020-07-24 | Stop reason: SDUPTHER

## 2020-07-01 ENCOUNTER — TELEPHONE (OUTPATIENT)
Dept: RADIOLOGY | Facility: HOSPITAL | Age: 65
End: 2020-07-01

## 2020-07-01 ENCOUNTER — HOSPITAL ENCOUNTER (OUTPATIENT)
Dept: RADIOLOGY | Facility: HOSPITAL | Age: 65
Discharge: HOME | End: 2020-07-01
Attending: UROLOGY | Admitting: RADIOLOGY
Payer: COMMERCIAL

## 2020-07-01 DIAGNOSIS — N13.30 HYDRONEPHROSIS: ICD-10-CM

## 2020-07-01 PROCEDURE — 50387 CHANGE NEPHROURETERAL CATH: CPT | Performed by: RADIOLOGY

## 2020-07-01 PROCEDURE — 50387 CHANGE NEPHROURETERAL CATH: CPT

## 2020-07-01 PROCEDURE — C1769 GUIDE WIRE: HCPCS

## 2020-07-01 PROCEDURE — C2617 STENT, NON-COR, TEM W/O DEL: HCPCS

## 2020-07-01 RX ORDER — LIDOCAINE WITH 8.4% SOD BICARB 0.9%(10ML)
SYRINGE (ML) INJECTION CODE/TRAUMA/SEDATION MEDICATION
Status: COMPLETED | OUTPATIENT
Start: 2020-07-01 | End: 2020-07-01

## 2020-07-01 RX ADMIN — Medication 5 ML: at 10:33

## 2020-07-01 RX ADMIN — IOHEXOL 20 ML: 350 INJECTION, SOLUTION INTRAVENOUS at 10:55

## 2020-07-01 NOTE — BRIEF OP NOTE (RAD/CATH)
IR PCNU TUBE CHANGE Procedure Note    PATIENT NAME: Sussy Ochoa  : 1955  MRN: [id number]    Pre-op Diagnosis:   1  Hydronephrosis      Post-op Diagnosis:   1  Hydronephrosis        Surgeon:   David Reed MD    Estimated Blood Loss: None    Findings: Successful right PCNU exchange      Specimens: None    Complications:  None    Anesthesia: Local    David Reed MD     Date: 2020  Time: 10:41 AM

## 2020-07-01 NOTE — H&P
Interventional Radiology  History and Physical 7/1/2020     History of Present Illness:  60year old female with history of right ureteral obstruction secondary to endometrial cancer s/p PCNU placement returns for routine exchange of catheter      Past Medical History:   Diagnosis Date    Anemia     Chemotherapy follow-up examination     Depression     [facility]omet[facility] ([facility] ) 12/2017    Hyperglycemia     vx type 2 dm -- last assessed 4/1/14; resolved 11/7/17    Hyperlipidemia     Hypertension     Obesity     last assessed 10/14/17; resolved 11/7/17        Past Surgical History:   Procedure Laterality Date    ABDOMINAL SURGERY      GASTRIC BYPASS    CHOLECYSTECTOMY      at the time of gastric bypass    COLONOSCOPY      CT NEEDLE BIOPSY LYMPH NODE  7/8/2019    FL GUIDED CENTRAL VENOUS ACCESS DEVICE INSERTION  11/12/2019    GASTRIC BYPASS      HYSTERECTOMY Bilateral     total abdominal with salpingo-oophorectomy    IR PICC LINE  9/27/2019    IR PORT PLACEMENT  7/26/2019    IR PORT REMOVAL  9/20/2019    IR TUBE PLACEMENT NEPHROSTOMY  7/26/2019    OOPHORECTOMY Bilateral     [place] INSJ TUNNELED CTR VAD W/SUBQ PORT AGE 5 YR/> Left 11/12/2019    Procedure: INSERTION VENOUS PORT ( PORT-A-CATH) IR;  Surgeon: Salty Nelson DO;  [facility];  Service: Interventional Radiology    [place] LAP, RADICAL HYST W/ TUBE&OV, NODE BX N/A 12/19/2017    Procedure: HYSTERECTOMY LAPAROSCOPIC TOTAL ([address]) W/ ROBOTICS; BILATERAL SALPINGOOPHERECTOMY; LYMPH NODE DISSECTION; lysis of adhesions;  Surgeon: Brent Gregory MD;  Location: [facility];  Service: Gynecology Oncology    MN LAP,DIAGNOSTIC ABDOMEN N/A 12/19/2017    Procedure: LAPAROSCOPY DIAGNOSTIC;  Surgeon: Brent Gregory MD;  [facility];  Service: Gynecology Oncology    TONSILLECTOMY      US GUIDED BREAST BIOPSY RIGHT COMPLETE Right 6/28/2019        Social History     Tobacco Use   Smoking Status Never Smoker   Smokeless Tobacco Never Used Social History     Substance and Sexual Activity   Alcohol Use Not Currently        Social History     Substance and Sexual Activity   Drug Use No        Allergies   Allergen Reactions    Cephalosporins Rash     Which Cephalosporin reaction was to not specified; however, has tolerated Amoxicillin, Cefazolin, and Cefepime       Current Outpatient Medications   Medication Sig Dispense Refill    acetaminophen (TYLENOL) 325 mg tablet Take 2 tablets (650 mg total) by mouth every 6 (six) hours as needed for mild pain, headaches or fever 30 tablet 0    ARIPiprazole (ABILIFY) 10 mg tablet Take 10 mg by mouth daily      aspirin (ECOTRIN LOW STRENGTH) 81 mg EC tablet Take 81 mg by mouth daily      cholecalciferol (VITAMIN D3) 1,000 units tablet Take 1,000 Units by mouth daily      cyclobenzaprine (FLEXERIL) 10 mg tablet Take 1 tablet (10 mg total) by mouth daily at bedtime 30 tablet 1    enoxaparin (Lovenox) 120 mg/0 8 mL Inject 0 8 mL (120 mg total) under the skin daily 30 Syringe 3    folic acid (FOLVITE) 1 mg tablet Take 1 tablet (1 mg total) by mouth daily  0    gemfibrozil (LOPID) 600 mg tablet TAKE 1 TABLET BY MOUTH EVERY DAY 90 tablet 1    hydrOXYzine HCL (ATARAX) 25 mg tablet Take 1 tablet (25 mg total) by mouth every 6 (six) hours as needed for itching 30 tablet 0    MYRBETRIQ 50 MG TB24 TAKE 1 TABLET BY MOUTH EVERY DAY 90 tablet 3    omeprazole (PriLOSEC) 20 mg delayed release capsule Take 20 mg by mouth daily      ondansetron (ZOFRAN) 8 mg tablet Take 1 tablet (8 mg total) by mouth every 8 (eight) hours as needed for nausea or vomiting 30 tablet 1    oxybutynin (DITROPAN-XL) 10 MG 24 hr tablet Take 1 tablet (10 mg total) by mouth daily 90 tablet 3    quinapril (ACCUPRIL) 5 mg tablet TAKE 1 TABLET BY MOUTH EVERYDAY AT BEDTIME 90 tablet 1    saccharomyces boulardii (FLORASTOR) 250 mg capsule Take 1 capsule (250 mg total) by mouth 2 (two) times a day  0    traMADol (ULTRAM) 50 mg tablet Take 1 tablet (50 mg total) by mouth every 6 (six) hours as needed for moderate pain 60 tablet 0    venlafaxine (EFFEXOR) 75 mg tablet Take 75 mg by mouth 3 (three) times a day         No current facility-administered medications for this encounter  Objective:     Physical Exam    Const: NAD  Right flank: PCNU in place    Lab Results   Component Value Date    WBC 5 93 04/08/2020    HGB 9 2 (L) 04/08/2020    HCT 28 8 (L) 04/08/2020    MCV 97 04/08/2020     04/08/2020     Lab Results   Component Value Date    INR 1 14 01/07/2020    INR 0 97 01/05/2020    INR 1 25 (H) 12/22/2019    PROTIME 14 2 01/07/2020    PROTIME 12 5 01/05/2020    PROTIME 15 3 (H) 12/22/2019     Lab Results   Component Value Date    PTT 40 (H) 01/07/2020     I have personally reviewed pertinent imaging and laboratory results       Assessment/Plan:  - Right PCNU exchange

## 2020-07-04 ENCOUNTER — HOSPITAL ENCOUNTER (EMERGENCY)
Facility: HOSPITAL | Age: 65
Discharge: HOME | End: 2020-07-04
Attending: EMERGENCY MEDICINE
Payer: COMMERCIAL

## 2020-07-04 ENCOUNTER — APPOINTMENT (EMERGENCY)
Dept: CT IMAGING | Facility: HOSPITAL | Age: 65
End: 2020-07-04
Payer: COMMERCIAL

## 2020-07-04 ENCOUNTER — HOSPITAL ENCOUNTER (OUTPATIENT)
Dept: ULTRASOUND IMAGING | Facility: HOSPITAL | Age: 65
Discharge: HOME | End: 2020-07-04
Payer: COMMERCIAL

## 2020-07-04 VITALS
TEMPERATURE: 98.1 F | SYSTOLIC BLOOD PRESSURE: 109 MMHG | HEART RATE: 70 BPM | OXYGEN SATURATION: 98 % | DIASTOLIC BLOOD PRESSURE: 55 MMHG | RESPIRATION RATE: 20 BRPM

## 2020-07-04 DIAGNOSIS — K59.00 CONSTIPATION: ICD-10-CM

## 2020-07-04 DIAGNOSIS — K57.32 DIVERTICULITIS OF SIGMOID COLON: ICD-10-CM

## 2020-07-04 DIAGNOSIS — R10.31 RIGHT INGUINAL PAIN: Primary | ICD-10-CM

## 2020-07-04 LAB
ALBUMIN SERPL BCP-MCNC: 3.3 G/DL (ref 3.5–5)
ALP SERPL-CCNC: 110 U/L (ref 46–116)
ALT SERPL W P-5'-P-CCNC: 15 U/L (ref 12–78)
ANION GAP SERPL CALCULATED.3IONS-SCNC: 7 MMOL/L (ref 4–13)
APTT PPP: 66 SECONDS (ref 23–37)
AST SERPL W P-5'-P-CCNC: 25 U/L (ref 5–45)
BASOPHILS # BLD AUTO: 0.03 THOUSANDS/ΜL (ref 0–0.1)
BASOPHILS NFR BLD AUTO: 1 % (ref 0–1)
BILIRUB SERPL-MCNC: 0.2 MG/DL (ref 0.2–1)
BUN SERPL-MCNC: 22 MG/DL (ref 5–25)
CALCIUM SERPL-MCNC: 9 MG/DL (ref 8.3–10.1)
CHLORIDE SERPL-SCNC: 102 MMOL/L (ref 100–108)
CO2 SERPL-SCNC: 26 MMOL/L (ref 21–32)
CREAT SERPL-MCNC: 0.97 MG/DL (ref 0.6–1.3)
EOSINOPHIL # BLD AUTO: 0.02 THOUSAND/ΜL (ref 0–0.61)
EOSINOPHIL NFR BLD AUTO: 0 % (ref 0–6)
ERYTHROCYTE [DISTWIDTH] IN BLOOD BY AUTOMATED COUNT: 13.2 % (ref 11.6–15.1)
GFR SERPL CREATININE-BSD FRML MDRD: 63 ML/MIN/1.73SQ M
GLUCOSE SERPL-MCNC: 93 MG/DL (ref 65–140)
HCT VFR BLD AUTO: 29.6 % (ref 34.8–46.1)
HGB BLD-MCNC: 9.2 G/DL (ref 11.5–15.4)
IMM GRANULOCYTES # BLD AUTO: 0.09 THOUSAND/UL (ref 0–0.2)
IMM GRANULOCYTES NFR BLD AUTO: 2 % (ref 0–2)
INR PPP: 1.2 (ref 0.84–1.19)
LYMPHOCYTES # BLD AUTO: 0.53 THOUSANDS/ΜL (ref 0.6–4.47)
LYMPHOCYTES NFR BLD AUTO: 12 % (ref 14–44)
MCH RBC QN AUTO: 29.6 PG (ref 26.8–34.3)
MCHC RBC AUTO-ENTMCNC: 31.1 G/DL (ref 31.4–37.4)
MCV RBC AUTO: 95 FL (ref 82–98)
MONOCYTES # BLD AUTO: 0.67 THOUSAND/ΜL (ref 0.17–1.22)
MONOCYTES NFR BLD AUTO: 15 % (ref 4–12)
NEUTROPHILS # BLD AUTO: 3.11 THOUSANDS/ΜL (ref 1.85–7.62)
NEUTS SEG NFR BLD AUTO: 70 % (ref 43–75)
NRBC BLD AUTO-RTO: 0 /100 WBCS
PLATELET # BLD AUTO: 245 THOUSANDS/UL (ref 149–390)
PMV BLD AUTO: 9.4 FL (ref 8.9–12.7)
POTASSIUM SERPL-SCNC: 4.9 MMOL/L (ref 3.5–5.3)
PROT SERPL-MCNC: 7.6 G/DL (ref 6.4–8.2)
PROTHROMBIN TIME: 14.6 SECONDS (ref 11.6–14.5)
RBC # BLD AUTO: 3.11 MILLION/UL (ref 3.81–5.12)
SODIUM SERPL-SCNC: 135 MMOL/L (ref 136–145)
WBC # BLD AUTO: 4.45 THOUSAND/UL (ref 4.31–10.16)

## 2020-07-04 PROCEDURE — 99284 EMERGENCY DEPT VISIT MOD MDM: CPT | Performed by: EMERGENCY MEDICINE

## 2020-07-04 PROCEDURE — 36415 COLL VENOUS BLD VENIPUNCTURE: CPT | Performed by: EMERGENCY MEDICINE

## 2020-07-04 PROCEDURE — 93978 VASCULAR STUDY: CPT

## 2020-07-04 PROCEDURE — 93978 VASCULAR STUDY: CPT | Performed by: SURGERY

## 2020-07-04 PROCEDURE — 99284 EMERGENCY DEPT VISIT MOD MDM: CPT

## 2020-07-04 PROCEDURE — 85610 PROTHROMBIN TIME: CPT | Performed by: EMERGENCY MEDICINE

## 2020-07-04 PROCEDURE — 96361 HYDRATE IV INFUSION ADD-ON: CPT

## 2020-07-04 PROCEDURE — 96374 THER/PROPH/DIAG INJ IV PUSH: CPT

## 2020-07-04 PROCEDURE — 85025 COMPLETE CBC W/AUTO DIFF WBC: CPT | Performed by: EMERGENCY MEDICINE

## 2020-07-04 PROCEDURE — 74177 CT ABD & PELVIS W/CONTRAST: CPT

## 2020-07-04 PROCEDURE — 80053 COMPREHEN METABOLIC PANEL: CPT | Performed by: EMERGENCY MEDICINE

## 2020-07-04 PROCEDURE — 85730 THROMBOPLASTIN TIME PARTIAL: CPT | Performed by: EMERGENCY MEDICINE

## 2020-07-04 RX ORDER — CIPROFLOXACIN 500 MG/1
500 TABLET, FILM COATED ORAL ONCE
Status: COMPLETED | OUTPATIENT
Start: 2020-07-04 | End: 2020-07-04

## 2020-07-04 RX ORDER — MULTIVITAMIN
1 TABLET ORAL DAILY
COMMUNITY

## 2020-07-04 RX ORDER — OXYCODONE HYDROCHLORIDE 5 MG/1
5 TABLET ORAL EVERY 6 HOURS PRN
Qty: 18 TABLET | Refills: 0 | Status: SHIPPED | OUTPATIENT
Start: 2020-07-04 | End: 2020-07-13 | Stop reason: SDUPTHER

## 2020-07-04 RX ORDER — CIPROFLOXACIN 500 MG/1
500 TABLET, FILM COATED ORAL EVERY 12 HOURS SCHEDULED
Qty: 14 TABLET | Refills: 0 | Status: SHIPPED | OUTPATIENT
Start: 2020-07-04 | End: 2020-07-11

## 2020-07-04 RX ORDER — METRONIDAZOLE 500 MG/1
500 TABLET ORAL ONCE
Status: COMPLETED | OUTPATIENT
Start: 2020-07-04 | End: 2020-07-04

## 2020-07-04 RX ORDER — MORPHINE SULFATE 4 MG/ML
4 INJECTION, SOLUTION INTRAMUSCULAR; INTRAVENOUS ONCE
Status: COMPLETED | OUTPATIENT
Start: 2020-07-04 | End: 2020-07-04

## 2020-07-04 RX ORDER — METRONIDAZOLE 500 MG/1
500 TABLET ORAL 3 TIMES DAILY
Qty: 21 TABLET | Refills: 0 | Status: SHIPPED | OUTPATIENT
Start: 2020-07-04 | End: 2020-07-11

## 2020-07-04 RX ADMIN — IOHEXOL 100 ML: 350 INJECTION, SOLUTION INTRAVENOUS at 08:37

## 2020-07-04 RX ADMIN — CIPROFLOXACIN HYDROCHLORIDE 500 MG: 500 TABLET, FILM COATED ORAL at 09:53

## 2020-07-04 RX ADMIN — METRONIDAZOLE 500 MG: 500 TABLET, FILM COATED ORAL at 09:53

## 2020-07-04 RX ADMIN — MORPHINE SULFATE 4 MG: 4 INJECTION INTRAVENOUS at 07:53

## 2020-07-04 RX ADMIN — SODIUM CHLORIDE 500 ML: 0.9 INJECTION, SOLUTION INTRAVENOUS at 07:53

## 2020-07-04 NOTE — DISCHARGE INSTRUCTIONS
Follow-up with your PCP in 2 to 3 days for re-evaluation of right inguinal/pain  You May benefit from continued physical therapy for the right hip

## 2020-07-04 NOTE — ED NOTES
Patient ambulated with slow, steady gait with assistance from cane  Denies any worsening pain or dizziness    "I'm able to at least move around a little bit better now "      Govind Devine RN  07/04/20 1002 needs device

## 2020-07-04 NOTE — ED PROVIDER NOTES
History  Chief Complaint   Patient presents with    Abdominal Pain     pt reports ongoing LRQ ABD pain for appox 1 week  denies n/v/d, pt points to groin,      63-year-old female presents the emergency department for evaluation of right inguinal pain  Patient states that she has had this discomfort for approximately 1 week  She denies associated nausea vomiting or change in bowel habits  Patient states that she had a tumor localized to the right inguinal region  No skin changes noted  No bulging or history of hernia  No tender masses or lymph nodes noted  She completed chemo and radiation treatment for metastatic endometrial cancer in April  Patient states that she did receive radiation to this area of discomfort  She has also been 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 secondary to progression of deep vein thrombosis on oral anticoagulation  Patient reports that she has increased pain in the right inguinal region when she ambulates  She has been taking Ultram for chronic back pain which is helping with the pain  Patient has a chronic right-sided nephrostomy tube  The tube was recently changed and has been draining appropriately  Denies flank pain  Patient states that she was attending physical therapy for treatment of right hip  She has had a previous hysterectomy, cholecystectomy and gastric bypass  History provided by:  Patient and relative   used: No    Abdominal Pain   Pain location:  RLQ  Pain quality: aching    Pain radiates to:  Groin  Pain severity:  Moderate  Onset quality:  Gradual  Duration:  1 week  Timing:  Constant  Progression:  Unchanged  Chronicity: unclear - has had pain in this region previously  Context: previous surgery    Context: not diet changes, not eating, not recent illness, not sick contacts, not suspicious food intake and not trauma    Relieved by: Rest   Worsened by:   Movement (Weight-bearing on the right leg)  Ineffective treatments: Ultram   Associated symptoms: no anorexia, no chest pain, no chills, no cough, no diarrhea, no dysuria, no fever, no nausea and no vomiting    Risk factors: multiple surgeries    Risk factors: no recent hospitalization        Prior to Admission Medications   Prescriptions Last Dose Informant Patient Reported? Taking?    ARIPiprazole (ABILIFY) 10 mg tablet  Self Yes Yes   Sig: Take 10 mg by mouth daily   Calcium-Magnesium-Vitamin D (CALCIUM 500 PO)   Yes Yes   Sig: Take by mouth   Cyanocobalamin (VITAMIN B12 PO)   Yes Yes   Sig: Take by mouth   MYRBETRIQ 50 MG TB24  Self No Yes   Sig: TAKE 1 TABLET BY MOUTH EVERY DAY   Multiple Vitamin (MULTIVITAMIN) tablet   Yes Yes   Sig: Take 1 tablet by mouth daily   acetaminophen (TYLENOL) 325 mg tablet  Self No Yes   Sig: Take 2 tablets (650 mg total) by mouth every 6 (six) hours as needed for mild pain, headaches or fever   aspirin (ECOTRIN LOW STRENGTH) 81 mg EC tablet  Self Yes Yes   Sig: Take 81 mg by mouth daily   cholecalciferol (VITAMIN D3) 1,000 units tablet  Self Yes Yes   Sig: Take 1,000 Units by mouth daily   cyclobenzaprine (FLEXERIL) 10 mg tablet   No Yes   Sig: Take 1 tablet (10 mg total) by mouth daily at bedtime   enoxaparin (Lovenox) 120 mg/0 8 mL 7/4/2020 at Unknown time  No Yes   Sig: Inject 0 8 mL (120 mg total) under the skin daily   folic acid (FOLVITE) 1 mg tablet  Self No Yes   Sig: Take 1 tablet (1 mg total) by mouth daily   gemfibrozil (LOPID) 600 mg tablet   No Yes   Sig: TAKE 1 TABLET BY MOUTH EVERY DAY   hydrOXYzine HCL (ATARAX) 25 mg tablet Not Taking at Unknown time Self No No   Sig: Take 1 tablet (25 mg total) by mouth every 6 (six) hours as needed for itching   Patient not taking: Reported on 7/4/2020   omeprazole (PriLOSEC) 20 mg delayed release capsule  Self Yes Yes   Sig: Take 20 mg by mouth daily   ondansetron (ZOFRAN) 8 mg tablet Not Taking at Unknown time Self No No   Sig: Take 1 tablet (8 mg total) by mouth every 8 (eight) hours as needed for nausea or vomiting   Patient not taking: Reported on 7/4/2020   oxybutynin (DITROPAN-XL) 10 MG 24 hr tablet   No Yes   Sig: Take 1 tablet (10 mg total) by mouth daily   quinapril (ACCUPRIL) 5 mg tablet   No Yes   Sig: TAKE 1 TABLET BY MOUTH EVERYDAY AT BEDTIME   saccharomyces boulardii (FLORASTOR) 250 mg capsule  Self No Yes   Sig: Take 1 capsule (250 mg total) by mouth 2 (two) times a day   traMADol (ULTRAM) 50 mg tablet   No Yes   Sig: Take 1 tablet (50 mg total) by mouth every 6 (six) hours as needed for moderate pain   venlafaxine (EFFEXOR) 75 mg tablet  Self Yes Yes   Sig: Take 75 mg by mouth 3 (three) times a day        Facility-Administered Medications: None       Past Medical History:   Diagnosis Date    Anemia     Chemotherapy follow-up examination     Depression     Endometrial cancer ([facility] [address] ) 12/2017    Hyperglycemia     vx type 2 dm -- last assessed 4/1/14; resolved 11/7/17    Hyperlipidemia     Hypertension     Obesity     last assessed 10/14/17; resolved 11/7/17       Past Surgical History:   Procedure Laterality Date    ABDOMINAL SURGERY      GASTRIC BYPASS    CHOLECYSTECTOMY      at the time of gastric bypass    COLONOSCOPY      CT NEEDLE BIOPSY LYMPH NODE  7/8/2019    FL GUIDED CENTRAL VENOUS ACCESS DEVICE INSERTION  11/12/2019    GASTRIC BYPASS      HYSTERECTOMY Bilateral     total abdominal with salpingo-oophorectomy    IR PICC LINE  9/27/2019    IR PORT PLACEMENT  7/26/2019    IR PORT REMOVAL  9/20/2019    IR TUBE PLACEMENT NEPHROSTOMY  7/26/2019    OOPHORECTOMY Bilateral     AK INSJ TUNNELED CTR VAD W/SUBQ PORT AGE 5 YR/> Left 11/12/2019    Procedure: INSERTION VENOUS PORT ( PORT-A-CATH) IR;  Surgeon: Cali Antonio DO;  [facility];  Service: Interventional Radiology    AK LAP, RADICAL HYST W/ TUBE&OV, NODE BX N/A 12/19/2017    Procedure: HYSTERECTOMY LAPAROSCOPIC TOTAL ([address]) W/ ROBOTICS; BILATERAL SALPINGOOPHERECTOMY; LYMPH NODE DISSECTION; lysis of adhesions;  Surgeon: Tiny Brooks MD;  [facility];  Service: Gynecology Oncology    NE LAP,DIAGNOSTIC ABDOMEN N/A 12/19/2017    Procedure: LAPAROSCOPY DIAGNOSTIC;  Surgeon: Tiny Brooks MD;  [facility];  Service: Gynecology Oncology    TONSILLECTOMY      US GUIDED BREAST BIOPSY RIGHT COMPLETE Right 6/28/2019       Family History   Problem Relation Age of Onset    Other Mother         dyslipidemia    Ovarian cancer Mother 48    Lymphoma Father     Bone cancer Maternal Grandfather     No Known Problems Sister     No Known Problems Maternal Grandmother     Uterine cancer Paternal Grandmother     No Known Problems Paternal Grandfather     No Known Problems Maternal Aunt     No Known Problems Maternal Aunt     No Known Problems Maternal Aunt     No Known Problems Maternal Aunt     No Known Problems Paternal Aunt     No Known Problems Paternal Aunt     No Known Problems Paternal Aunt     No Known Problems Paternal Aunt     No Known Problems Brother     No Known Problems Brother      I have reviewed and agree with the history as documented  E-Cigarette/Vaping    E-Cigarette Use Never User      E-Cigarette/Vaping Substances    Nicotine No     THC No     CBD No     Flavoring No     Other No     Unknown No      Social History     Tobacco Use    Smoking status: Never Smoker    Smokeless tobacco: Never Used   Substance Use Topics    Alcohol use: Not Currently    Drug use: No       Review of Systems   Constitutional: Negative for chills and fever  Respiratory: Negative for cough, choking and chest tightness  Cardiovascular: Negative for chest pain  Gastrointestinal: Positive for abdominal pain  Negative for anorexia, diarrhea, nausea and vomiting  Genitourinary: Negative for dysuria and flank pain  Musculoskeletal: Positive for arthralgias, back pain and gait problem  Negative for joint swelling and neck pain     Skin: Negative for rash and wound  Neurological: Negative for dizziness, weakness, light-headedness and numbness  All other systems reviewed and are negative  Physical Exam  Physical Exam   Constitutional: She is oriented to person, place, and time  She appears well-developed and well-nourished  She does not appear ill  No distress  HENT:   Head: Normocephalic  Nose: Nose normal    Mouth/Throat: Oropharynx is clear and moist  No oropharyngeal exudate  Eyes: Pupils are equal, round, and reactive to light  Conjunctivae and EOM are normal    Neck: Normal range of motion  Neck supple  Cardiovascular: Normal rate, regular rhythm, normal heart sounds and intact distal pulses  Pulmonary/Chest: Effort normal and breath sounds normal    Abdominal: Soft  Bowel sounds are normal  She exhibits no distension  There is tenderness in the right lower quadrant  There is no rebound and no guarding  No hernia  Hernia confirmed negative in the right inguinal area  Musculoskeletal: Normal range of motion  She exhibits no edema, tenderness or deformity  Arms:  Lymphadenopathy:     She has no cervical adenopathy  Neurological: She is alert and oriented to person, place, and time  She has normal strength and normal reflexes  No cranial nerve deficit or sensory deficit  She exhibits normal muscle tone  Coordination and gait normal    Skin: Skin is warm, dry and intact  No rash noted  Psychiatric: She has a normal mood and affect  Her behavior is normal  Judgment and thought content normal    Nursing note and vitals reviewed        Vital Signs  ED Triage Vitals [07/04/20 0719]   Temperature Pulse Respirations Blood Pressure SpO2   98 1 °F (36 7 °C) 70 20 109/55 98 %      Temp src Heart Rate Source Patient Position - Orthostatic VS BP Location FiO2 (%)   -- Monitor Lying Right arm --      Pain Score       8           Vitals:    07/04/20 0719   BP: 109/55   Pulse: 70   Patient Position - Orthostatic VS: Lying         Visual Acuity      ED Medications  Medications   morphine (PF) 4 mg/mL injection 4 mg (4 mg Intravenous Given 7/4/20 0753)   sodium chloride 0 9 % bolus 500 mL (0 mL Intravenous Stopped 7/4/20 0842)   iohexol (OMNIPAQUE) 350 MG/ML injection (MULTI-DOSE) 100 mL (100 mL Intravenous Given 7/4/20 0837)   ciprofloxacin (CIPRO) tablet 500 mg (500 mg Oral Given 7/4/20 0953)   metroNIDAZOLE (FLAGYL) tablet 500 mg (500 mg Oral Given 7/4/20 0953)       Diagnostic Studies  Results Reviewed     Procedure Component Value Units Date/Time    Protime-INR [[id number]]  (Abnormal) Collected:  07/04/20 0753    Lab Status:  Final result Specimen:  Blood from Arm, Right Updated:  07/04/20 0823     Protime 14 6 seconds      INR 1 20    APTT [[id number]]  (Abnormal) Collected:  07/04/20 0753    Lab Status:  Final result Specimen:  Blood from Arm, Right Updated:  07/04/20 0823     PTT 66 seconds     Comprehensive metabolic panel [[id number]]  (Abnormal) Collected:  07/04/20 0753    Lab Status:  Final result Specimen:  Blood from Arm, Right Updated:  07/04/20 7638     Sodium 135 mmol/L      Potassium 4 9 mmol/L      Chloride 102 mmol/L      CO2 26 mmol/L      ANION GAP 7 mmol/L      BUN 22 mg/dL      Creatinine 0 97 mg/dL      Glucose 93 mg/dL      Calcium 9 0 mg/dL      AST 25 U/L      ALT 15 U/L      Alkaline Phosphatase 110 U/L      Total Protein 7 6 g/dL      Albumin 3 3 g/dL      Total Bilirubin 0 20 mg/dL      eGFR 63 ml/min/1 73sq m     Narrative:       Ly guidelines for Chronic Kidney Disease (CKD):     Stage 1 with normal or high GFR (GFR > 90 mL/min/1 73 square meters)    Stage 2 Mild CKD (GFR = 60-89 mL/min/1 73 square meters)    Stage 3A Moderate CKD (GFR = 45-59 mL/min/1 73 square meters)    Stage 3B Moderate CKD (GFR = 30-44 mL/min/1 73 square meters)    Stage 4 Severe CKD (GFR = 15-29 mL/min/1 73 square meters)    Stage 5 End Stage CKD (GFR <15 mL/min/1 73 square meters)  Note: GFR calculation is accurate only with a steady state creatinine    CBC and differential [[id number]]  (Abnormal) Collected:  07/04/20 0753    Lab Status:  Final result Specimen:  Blood from Arm, Right Updated:  07/04/20 0807     WBC 4 45 Thousand/uL      RBC 3 11 Million/uL      Hemoglobin 9 2 g/dL      Hematocrit 29 6 %      MCV 95 fL      MCH 29 6 pg      MCHC 31 1 g/dL      RDW 13 2 %      MPV 9 4 fL      Platelets 538 Thousands/uL      nRBC 0 /100 WBCs      Neutrophils Relative 70 %      Immat GRANS % 2 %      Lymphocytes Relative 12 %      Monocytes Relative 15 %      Eosinophils Relative 0 %      Basophils Relative 1 %      Neutrophils Absolute 3 11 Thousands/µL      Immature Grans Absolute 0 09 Thousand/uL      Lymphocytes Absolute 0 53 Thousands/µL      Monocytes Absolute 0 67 Thousand/µL      Eosinophils Absolute 0 02 Thousand/µL      Basophils Absolute 0 03 Thousands/µL                  CT abdomen pelvis with contrast   Final Result by Orquidea Chávez DO (07/04 0919)   1  Mild abnormal appearance of the sigmoid colon representing either mild diverticulitis versus mild colitis, either infectious or inflammatory  2   Moderate constipation  3   Right-sided percutaneous nephroureterostomy tube in satisfactory position  The study was marked in [facility] for immediate notification  Workstation performed: [id number]         VAS ivc/iliac veins duplex; complete study    (Results Pending)              Procedures  Procedures         ED Course  ED Course as of Jul 04 1128   Sat Jul 04, 2020   8871 Patient updated with results and reevaluated by me  VALERIE/DAST-10      Most Recent Value   How many times in the past year have you    Used an illegal drug or used a prescription medication for non-medical reasons?   Never Filed at: 07/04/2020 6110                                MDM  Number of Diagnoses or Management Options  Constipation: new and requires workup  Diverticulitis of sigmoid colon: new and requires workup  Right inguinal pain: new and requires workup     Amount and/or Complexity of Data Reviewed  Clinical lab tests: reviewed and ordered  Tests in the radiology section of CPT®: ordered and reviewed  Decide to obtain previous medical records or to obtain history from someone other than the patient: yes  Obtain history from someone other than the patient: yes  Independent visualization of images, tracings, or specimens: yes    Risk of Complications, Morbidity, and/or Mortality  General comments: 22-year-old female presents with right inguinal pain, worse with weight-bearing  Patient had CT scan has demonstrated sigmoid diverticulitis/colitis and constipation  Will treat with oral antibiotics though not convinced that this is the etiology patient's right inguinal discomfort  Patient has a history of significant radiation to the right hip and surrounding tissue  Pain may be secondary to previous radiation treatment and or degenerative changes of the right hip  Patient was able to ambulate in the department with out difficulty  Her pain was well controlled  I do recommend that she follow-up with her PCP to discuss possible further physical therapy for the right hip as this is likely contributing to the patient's right inguinal discomfort   [facility] and I feel it is warranted to prescribe this patient the narcotic or benzodiazepine medications given at discharge  Discussed signs and symptoms return to the emergency department      Patient Progress  Patient progress: stable        Disposition  Final diagnoses:   Right inguinal pain   Diverticulitis of sigmoid colon   Constipation     Time reflects when diagnosis was documented in both MDM as applicable and the Disposition within this note     Time User Action Codes Description Comment    7/4/2020 10:01 AM Magi Chang Add [R10 31] Right inguinal pain     7/4/2020 10:01 AM Magi Chang Add [K57 32] Diverticulitis of sigmoid colon     7/4/2020 10:02 AM Misael Magi Johnson Add [K59 00] Constipation       ED Disposition     ED Disposition Condition Date/Time Comment    Discharge Stable Sat Jul 4, 2020 10:00 AM Ayse Melendez discharge to home/self care  Follow-up Information     Follow up With Specialties Details Why Contact Info Additional Information    David Estrada,  Internal Medicine, Family Medicine Schedule an appointment as soon as possible for a visit in 2 days For recheck of current symptoms [phone] [facility] [place] [facility] Schedule an appointment as soon as possible for a visit  For recheck of current symptoms - for follow-up diverticulitis/colitis [address] [facility] [address] [facility] [place], [address] [facility] [address] [place], [place], [address]          Discharge Medication List as of 7/4/2020 10:08 AM      START taking these medications    Details   ciprofloxacin (Cipro) 500 mg tablet Take 1 tablet (500 mg total) by mouth every 12 (twelve) hours for 7 days, Starting Sat 7/4/2020, Until Sat 7/11/2020, Normal      metroNIDAZOLE (Flagyl) 500 mg tablet Take 1 tablet (500 mg total) by mouth 3 (three) times a day for 7 days, Starting Sat 7/4/2020, Until Sat 7/11/2020, Normal      oxyCODONE (ROXICODONE) 5 mg immediate release tablet Take 1 tablet (5 mg total) by mouth every 6 (six) hours as needed for moderate pain for up to 10 daysMax Daily Amount: 20 mg, Starting Sat 7/4/2020, Until Tue 7/14/2020, Normal         CONTINUE these medications which have NOT CHANGED    Details   acetaminophen (TYLENOL) 325 mg tablet Take 2 tablets (650 mg total) by mouth every 6 (six) hours as needed for mild pain, headaches or fever, Starting Tue 10/8/2019, Print      ARIPiprazole (ABILIFY) 10 mg tablet Take 10 mg by mouth daily, Historical Med      aspirin (ECOTRIN LOW STRENGTH) 81 mg EC tablet Take 81 mg by mouth daily, Historical Med      Calcium-Magnesium-Vitamin D (CALCIUM 500 PO) Take by mouth, Historical Med      cholecalciferol (VITAMIN D3) 1,000 units tablet Take 1,000 Units by mouth daily, Historical Med      Cyanocobalamin (VITAMIN B12 PO) Take by mouth, Historical Med      cyclobenzaprine (FLEXERIL) 10 mg tablet Take 1 tablet (10 mg total) by mouth daily at bedtime, Starting Tue 6/30/2020, Normal      enoxaparin (Lovenox) 120 mg/0 8 mL Inject 0 8 mL (120 mg total) under the skin daily, Starting Mon 0/08/7311, Normal      folic acid (FOLVITE) 1 mg tablet Take 1 tablet (1 mg total) by mouth daily, Starting Tue 10/8/2019, Print      gemfibrozil (LOPID) 600 mg tablet TAKE 1 TABLET BY MOUTH EVERY DAY, Normal      Multiple Vitamin (MULTIVITAMIN) tablet Take 1 tablet by mouth daily, Historical Med      MYRBETRIQ 50 MG TB24 TAKE 1 TABLET BY MOUTH EVERY DAY, Normal      omeprazole (PriLOSEC) 20 mg delayed release capsule Take 20 mg by mouth daily, Historical Med      oxybutynin (DITROPAN-XL) 10 MG 24 hr tablet Take 1 tablet (10 mg total) by mouth daily, Starting Tue 6/30/2020, Normal      quinapril (ACCUPRIL) 5 mg tablet TAKE 1 TABLET BY MOUTH EVERYDAY AT BEDTIME, Normal      saccharomyces boulardii (FLORASTOR) 250 mg capsule Take 1 capsule (250 mg total) by mouth 2 (two) times a day, Starting Tue 10/8/2019, Print      traMADol (ULTRAM) 50 mg tablet Take 1 tablet (50 mg total) by mouth every 6 (six) hours as needed for moderate pain, Starting Tue 6/30/2020, Normal      venlafaxine (EFFEXOR) 75 mg tablet Take 75 mg by mouth 3 (three) times a day  , Historical Med      hydrOXYzine HCL (ATARAX) 25 mg tablet Take 1 tablet (25 mg total) by mouth every 6 (six) hours as needed for itching, Starting Sun 1/12/2020, Print      ondansetron (ZOFRAN) 8 mg tablet Take 1 tablet (8 mg total) by mouth every 8 (eight) hours as needed for nausea or vomiting, Starting Wed 7/17/2019, Normal           No discharge procedures on file      PDMP Review       Value Time User    PDMP Reviewed  Yes 6/30/2020  4:00 PM Nicole Correa DO          ED Provider  Electronically Signed by           Andrés Lopez DO  07/04/20 1128

## 2020-07-08 ENCOUNTER — TELEPHONE (OUTPATIENT)
Dept: UROLOGY | Facility: HOSPITAL | Age: 65
End: 2020-07-08

## 2020-07-08 ENCOUNTER — OFFICE VISIT (OUTPATIENT)
Dept: FAMILY MEDICINE CLINIC | Facility: CLINIC | Age: 65
End: 2020-07-08
Payer: COMMERCIAL

## 2020-07-08 VITALS
HEART RATE: 56 BPM | TEMPERATURE: 97.2 F | SYSTOLIC BLOOD PRESSURE: 110 MMHG | OXYGEN SATURATION: 97 % | DIASTOLIC BLOOD PRESSURE: 68 MMHG

## 2020-07-08 DIAGNOSIS — N13.5 OBSTRUCTION OF RIGHT URETER: Primary | ICD-10-CM

## 2020-07-08 DIAGNOSIS — M54.50 RIGHT-SIDED LOW BACK PAIN WITHOUT SCIATICA, UNSPECIFIED CHRONICITY: Primary | ICD-10-CM

## 2020-07-08 PROCEDURE — 1036F TOBACCO NON-USER: CPT | Performed by: INTERNAL MEDICINE

## 2020-07-08 PROCEDURE — 3078F DIAST BP <80 MM HG: CPT | Performed by: INTERNAL MEDICINE

## 2020-07-08 PROCEDURE — 99214 OFFICE O/P EST MOD 30 MIN: CPT | Performed by: INTERNAL MEDICINE

## 2020-07-08 PROCEDURE — 3074F SYST BP LT 130 MM HG: CPT | Performed by: INTERNAL MEDICINE

## 2020-07-08 NOTE — TELEPHONE ENCOUNTER
Attempted call to patient on listed cell number, but voicemail left  Wanted to discuss recommendations per Dr Ladi Mayes regarding her right nephrostomy tube  Please see encounter below  Patient will likely require nephrostomy tube indefinitely  However, if she is interested in removal, we can consider antegrade nephrostogram at next tube exchange, and maintain close monitoring of BMP and imaging

## 2020-07-08 NOTE — TELEPHONE ENCOUNTER
----- Message from Gregg Hinton MD sent at 7/7/2020 11:17 AM EDT -----  She will likely end up needing it indefinitely, We can always have IR do an antegrade nephrostogram at her next tube change to see if things are open and have them removed the tubes if the ureters are easily patent  She would need close follow up with BMP and imaging if tubes removed   ----- Message -----  From: RAFITA Mcdaniel  Sent: 6/30/2020  11:35 AM EDT  To: Gregg Hinton MD    Patient known to you, has hx of endometrial cancer with ureteral obstruction, managed with nephrostomy tube, and follows with GYN/Onc  She has completed radiation therapy, and recent CT scan identified some residual lymphadenopathy, otherwise no recurrence  Cr 0 82  Patient questioning if nephrostomy tube needs to be maintained long term, or can be removed eventually? What are your recommendations?  Thx

## 2020-07-08 NOTE — PROGRESS NOTES
Assessment/Plan:         Diagnoses and all orders for this visit:    Right-sided low back pain without sciatica, unspecified chronicity  Comments:  check mri  r/o radiculopathy to rt groin,  r/o mets  ask gyn oncology to see sooner  Orders:  -     MRI lumbar spine wo contrast; Future          Subjective:      Patient ID: Delmer Womack is a 60 y o  female  Pt was seen for left lower back pain and rt groin pain  Given flexeril and tramadol  No relief  She went to er 7/4 and had ct  ? Diverticulitis on left sigmid area  No evidence of appy or lyphadenopathy from her endo cancer  She also had vascular studies to look for dvt in her pelvis which she had before  She was given antibiotic and maybe her llq area is better but rt worse  It is aggravated with changing position  She is now in a wheel chair  She has had this > 1 week  No rash of shingles  She ? hernnia etc   She had her nephrostomy tube changed and ct commented it is functioning  Will order mri of lumbar spine to look for rt low back pain and radiation to rt groin  +doubt mets  Will ask her oncology surgeon to look at  The following portions of the patient's history were reviewed and updated as appropriate: She  has a past medical history of Anemia, Chemotherapy follow-up examination, Depression, Endometrial cancer ([address] ) (12/2017), Hyperglycemia, Hyperlipidemia, Hypertension, and Obesity    She   Patient Active Problem List    Diagnosis Date Noted    Bacteremia 01/08/2020    Obstruction of right ureter 01/03/2020    Hypomagnesemia 10/18/2019    Ambulatory dysfunction 10/11/2019    Moderate protein-calorie malnutrition (HCC) 10/03/2019    Bilateral lower extremity edema 09/30/2019    Generalized weakness 09/29/2019    Hypokalemia 09/19/2019    Hyponatremia 09/19/2019    Dehydration 09/18/2019    Chemotherapy-induced nausea 09/18/2019    Anemia 09/06/2019    Chemotherapy induced neutropenia ([address] ) 08/30/2019    Cancer related pain 07/19/2019    Encounter for central line care 07/16/2019    Acute deep vein thrombosis (DVT) of proximal vein of lower extremity ([facility] [address] ) 06/19/2019    Breast cancer screening 06/19/2019    Benign essential hypertension 12/19/2017    Class 3 severe obesity with body mass index (BMI) of 45 0 to 49 9 in adult [facility]) 12/19/2017    Endometrial cancer ([facility] [address] ) 11/29/2017    Dyslipidemia 04/01/2014    Major depression, chronic 10/07/2013     She  has a past surgical history that includes Abdominal surgery; Colonoscopy; pr lap, radical hyst w/ tube&ov, node bx (N/A, 12/19/2017); pr lap,diagnostic abdomen (N/A, 12/19/2017); Cholecystectomy; Gastric bypass; Tonsillectomy; Hysterectomy (Bilateral); Oophorectomy (Bilateral); US guided breast biopsy right complete (Right, 6/28/2019); CT needle biopsy lymph node (7/8/2019); IR port Placement (7/26/2019); IR tube placement nephrostomy (7/26/2019); IR port Removal (9/20/2019); IR PICC line (9/27/2019); pr insj tunneled ctr vad w/subq port age 13 yr/> (Left, 11/12/2019); and FL guided central venous access device insertion (11/12/2019)  Her family history includes Bone cancer in her maternal grandfather; Lymphoma in her father; No Known Problems in her brother, brother, maternal aunt, maternal aunt, maternal aunt, maternal aunt, maternal grandmother, paternal aunt, paternal aunt, paternal aunt, paternal aunt, paternal grandfather, and sister; Other in her mother; Ovarian cancer (age of onset: 48) in her mother; Uterine cancer in her paternal grandmother  She  reports that she has never smoked  She has never used smokeless tobacco  She reports that she drank alcohol  She reports that she does not use drugs    Current Outpatient Medications   Medication Sig Dispense Refill    acetaminophen (TYLENOL) 325 mg tablet Take 2 tablets (650 mg total) by mouth every 6 (six) hours as needed for mild pain, headaches or fever 30 tablet 0    ARIPiprazole (ABILIFY) 10 mg tablet Take 10 mg by mouth daily      aspirin (ECOTRIN LOW STRENGTH) 81 mg EC tablet Take 81 mg by mouth daily      Calcium-Magnesium-Vitamin D (CALCIUM 500 PO) Take by mouth      cholecalciferol (VITAMIN D3) 1,000 units tablet Take 1,000 Units by mouth daily      Cyanocobalamin (VITAMIN B12 PO) Take by mouth      cyclobenzaprine (FLEXERIL) 10 mg tablet Take 1 tablet (10 mg total) by mouth daily at bedtime 30 tablet 1    enoxaparin (Lovenox) 120 mg/0 8 mL Inject 0 8 mL (120 mg total) under the skin daily 30 Syringe 3    folic acid (FOLVITE) 1 mg tablet Take 1 tablet (1 mg total) by mouth daily  0    gemfibrozil (LOPID) 600 mg tablet TAKE 1 TABLET BY MOUTH EVERY DAY 90 tablet 1    hydrOXYzine HCL (ATARAX) 25 mg tablet Take 1 tablet (25 mg total) by mouth every 6 (six) hours as needed for itching 30 tablet 0    Multiple Vitamin (MULTIVITAMIN) tablet Take 1 tablet by mouth daily      MYRBETRIQ 50 MG TB24 TAKE 1 TABLET BY MOUTH EVERY DAY 90 tablet 3    omeprazole (PriLOSEC) 20 mg delayed release capsule Take 20 mg by mouth daily      ondansetron (ZOFRAN) 8 mg tablet Take 1 tablet (8 mg total) by mouth every 8 (eight) hours as needed for nausea or vomiting 30 tablet 1    oxybutynin (DITROPAN-XL) 10 MG 24 hr tablet Take 1 tablet (10 mg total) by mouth daily 90 tablet 3    quinapril (ACCUPRIL) 5 mg tablet TAKE 1 TABLET BY MOUTH EVERYDAY AT BEDTIME 90 tablet 1    saccharomyces boulardii (FLORASTOR) 250 mg capsule Take 1 capsule (250 mg total) by mouth 2 (two) times a day  0    traMADol (ULTRAM) 50 mg tablet Take 1 tablet (50 mg total) by mouth every 6 (six) hours as needed for moderate pain 60 tablet 0    venlafaxine (EFFEXOR) 75 mg tablet Take 75 mg by mouth 3 (three) times a day        oxyCODONE (ROXICODONE) 5 mg immediate release tablet Take 1 tablet (5 mg total) by mouth every 6 (six) hours as needed for moderate pain for up to 10 daysMax Daily Amount: 20 mg 18 tablet 0     No current facility-administered medications for this visit  Current Outpatient Medications on File Prior to Visit   Medication Sig    acetaminophen (TYLENOL) 325 mg tablet Take 2 tablets (650 mg total) by mouth every 6 (six) hours as needed for mild pain, headaches or fever    ARIPiprazole (ABILIFY) 10 mg tablet Take 10 mg by mouth daily    aspirin (ECOTRIN LOW STRENGTH) 81 mg EC tablet Take 81 mg by mouth daily    Calcium-Magnesium-Vitamin D (CALCIUM 500 PO) Take by mouth    cholecalciferol (VITAMIN D3) 1,000 units tablet Take 1,000 Units by mouth daily    Cyanocobalamin (VITAMIN B12 PO) Take by mouth    cyclobenzaprine (FLEXERIL) 10 mg tablet Take 1 tablet (10 mg total) by mouth daily at bedtime    enoxaparin (Lovenox) 120 mg/0 8 mL Inject 0 8 mL (120 mg total) under the skin daily    folic acid (FOLVITE) 1 mg tablet Take 1 tablet (1 mg total) by mouth daily    gemfibrozil (LOPID) 600 mg tablet TAKE 1 TABLET BY MOUTH EVERY DAY    hydrOXYzine HCL (ATARAX) 25 mg tablet Take 1 tablet (25 mg total) by mouth every 6 (six) hours as needed for itching    Multiple Vitamin (MULTIVITAMIN) tablet Take 1 tablet by mouth daily    MYRBETRIQ 50 MG TB24 TAKE 1 TABLET BY MOUTH EVERY DAY    omeprazole (PriLOSEC) 20 mg delayed release capsule Take 20 mg by mouth daily    ondansetron (ZOFRAN) 8 mg tablet Take 1 tablet (8 mg total) by mouth every 8 (eight) hours as needed for nausea or vomiting    oxybutynin (DITROPAN-XL) 10 MG 24 hr tablet Take 1 tablet (10 mg total) by mouth daily    quinapril (ACCUPRIL) 5 mg tablet TAKE 1 TABLET BY MOUTH EVERYDAY AT BEDTIME    saccharomyces boulardii (FLORASTOR) 250 mg capsule Take 1 capsule (250 mg total) by mouth 2 (two) times a day    traMADol (ULTRAM) 50 mg tablet Take 1 tablet (50 mg total) by mouth every 6 (six) hours as needed for moderate pain    venlafaxine (EFFEXOR) 75 mg tablet Take 75 mg by mouth 3 (three) times a day       No current facility-administered medications on file prior to visit  She is allergic to cephalosporins       Review of Systems   Constitutional: Negative  Negative for chills and fever  HENT: Negative  Respiratory: Negative  Cardiovascular: Negative  Musculoskeletal: Positive for back pain and gait problem  Str le = b/l         Objective:      /68 (BP Location: Right arm, Patient Position: Sitting, Cuff Size: Standard)   Pulse 56   Temp (!) 97 2 °F (36 2 °C)   LMP  (LMP Unknown)   SpO2 97%          Physical Exam   Constitutional: She appears well-developed and well-nourished  No distress  HENT:   Head: Normocephalic and atraumatic  Right Ear: External ear normal    Left Ear: External ear normal    Nose: Nose normal    Mouth/Throat: Oropharynx is clear and moist  No oropharyngeal exudate  Neck: Normal range of motion  Neck supple  No thyromegaly present  Cardiovascular: Normal rate, regular rhythm, normal heart sounds and intact distal pulses  Exam reveals no gallop and no friction rub  No murmur heard  Pulmonary/Chest: Effort normal and breath sounds normal  No stridor  No respiratory distress  She has no wheezes  She has no rales  Abdominal: Soft  Bowel sounds are normal  She exhibits no distension and no mass  There is no tenderness  There is no rebound and no guarding  Musculoskeletal:   Unable to palp rt groin pain   Lymphadenopathy:     She has no cervical adenopathy  Skin: No rash noted  She is not diaphoretic  No erythema  - rash of shingles

## 2020-07-10 NOTE — TELEPHONE ENCOUNTER
I called and spoke with Jeremiah Rios  Reviewed the option to have IR perform antegrade nephrostogram on her next scheduled change which is 9/4/2020  If ureter patent, nephrostomy can be removed and we would followup a BMP and US in 2-4 weeks after  If ureter is not patent the nephrostomy will simply be exchanged  She is definitely interested in trying  Updated IR orders

## 2020-07-13 DIAGNOSIS — K57.32 DIVERTICULITIS OF SIGMOID COLON: ICD-10-CM

## 2020-07-13 DIAGNOSIS — R10.31 RIGHT INGUINAL PAIN: ICD-10-CM

## 2020-07-13 RX ORDER — OXYCODONE HYDROCHLORIDE 5 MG/1
5 TABLET ORAL EVERY 6 HOURS PRN
Qty: 18 TABLET | Refills: 0 | Status: SHIPPED | OUTPATIENT
Start: 2020-07-13 | End: 2020-07-23

## 2020-07-14 ENCOUNTER — TELEPHONE (OUTPATIENT)
Dept: RADIATION ONCOLOGY | Facility: HOSPITAL | Age: 65
End: 2020-07-14

## 2020-07-15 ENCOUNTER — HOSPITAL ENCOUNTER (OUTPATIENT)
Dept: RADIOLOGY | Facility: IMAGING CENTER | Age: 65
Discharge: HOME | End: 2020-07-15
Attending: INTERNAL MEDICINE
Payer: COMMERCIAL

## 2020-07-15 DIAGNOSIS — M54.50 RIGHT-SIDED LOW BACK PAIN WITHOUT SCIATICA, UNSPECIFIED CHRONICITY: ICD-10-CM

## 2020-07-15 PROCEDURE — 72148 MRI LUMBAR SPINE W/O DYE: CPT

## 2020-07-20 DIAGNOSIS — I82.4Y9 ACUTE DEEP VEIN THROMBOSIS (DVT) OF PROXIMAL VEIN OF LOWER EXTREMITY, UNSPECIFIED LATERALITY (HCC): ICD-10-CM

## 2020-07-21 ENCOUNTER — TELEPHONE (OUTPATIENT)
Dept: GYNECOLOGIC ONCOLOGY | Facility: CLINIC | Age: 65
End: 2020-07-21

## 2020-07-24 DIAGNOSIS — M54.50 LEFT LOW BACK PAIN, UNSPECIFIED CHRONICITY, UNSPECIFIED WHETHER SCIATICA PRESENT: ICD-10-CM

## 2020-07-27 DIAGNOSIS — I10 BENIGN ESSENTIAL HYPERTENSION: ICD-10-CM

## 2020-07-27 RX ORDER — TRAMADOL HYDROCHLORIDE 50 MG/1
50 TABLET ORAL EVERY 6 HOURS PRN
Qty: 60 TABLET | Refills: 0 | Status: SHIPPED | OUTPATIENT
Start: 2020-07-27 | End: 2020-09-22 | Stop reason: SDUPTHER

## 2020-07-27 RX ORDER — QUINAPRIL 5 1/1
TABLET ORAL
Qty: 90 TABLET | Refills: 1 | Status: SHIPPED | OUTPATIENT
Start: 2020-07-27 | End: 2021-02-06

## 2020-07-30 ENCOUNTER — CONSULT (OUTPATIENT)
Dept: PAIN MEDICINE | Facility: MEDICAL CENTER | Age: 65
End: 2020-07-30
Payer: COMMERCIAL

## 2020-07-30 VITALS
HEIGHT: 59 IN | TEMPERATURE: 98.1 F | WEIGHT: 195 LBS | DIASTOLIC BLOOD PRESSURE: 64 MMHG | HEART RATE: 69 BPM | BODY MASS INDEX: 39.31 KG/M2 | SYSTOLIC BLOOD PRESSURE: 98 MMHG

## 2020-07-30 DIAGNOSIS — G57.01 PIRIFORMIS SYNDROME OF RIGHT SIDE: Primary | ICD-10-CM

## 2020-07-30 DIAGNOSIS — M54.50 ACUTE RIGHT-SIDED LOW BACK PAIN WITHOUT SCIATICA: ICD-10-CM

## 2020-07-30 PROCEDURE — 99244 OFF/OP CNSLTJ NEW/EST MOD 40: CPT | Performed by: PHYSICAL MEDICINE & REHABILITATION

## 2020-07-30 NOTE — PROGRESS NOTES
Assessment  1  Piriformis syndrome of right side    2  Acute right-sided low back pain without sciatica        Plan  Ms Bernice Alfonso is a pleasant 82-year-old female who presents for initial evaluation regarding 1 months duration of low back pain nonradiating  She has a significant past medical history of endometrial cancer who has undergone hysterectomy in 2017 as well as chemo radiation recently completed chemo in April and reports being in remission  During today's evaluation she is demonstrating clinical evidence of piriformis syndrome right-sided unrelieved with conservative measures including physical therapy, home exercises and over-the-counter NSAIDs  At this time I believe an interventional approach would be beneficial warranted including a steroid injection to the right piriformis  However I have asked the patient to confirm her oncologist is okay with moving forward with the procedure  All questions answered, patient is agreeable with plan and will call us back after her oncology appointment next week  Complete risks and benefits including bleeding, infection, tissue reaction, nerve injury and allergic reaction were discussed  The approach was demonstrated using models and literature was provided  Verbal and written consent was obtained  My impressions and treatment recommendations were discussed in detail with the patient who verbalized understanding and had no further questions  Discharge instructions were provided  I personally saw and examined the patient and I agree with the above discussed plan of care  Orders Placed This Encounter   Procedures    FL spine and pain procedure     Standing Status:   Future     Standing Expiration Date:   7/30/2024     Order Specific Question:   Reason for Exam:     Answer:   Right piriformis injection     Order Specific Question:   Anticoagulant hold needed? Answer:   No     No orders of the defined types were placed in this encounter        History of Present Illness    Niurka Patel is a 60 y o  female with past medical history significant for endometrial cancer status post hysterectomy in 2017 seen and has recently completed chemo radiation presents to [facility] for initial evaluation regarding 1 months duration of right-sided low back pain  Patient denies any significant inciting event or recent trauma  Today reports moderate to severe pain rated 5/10 and interfering with daily activities  Pain is nearly constant 60-95% of the time with no specific pattern of morning, afternoon or evening  Describes the pain as sharp  Also complains of intermittent lower extremity right leg weakness but denies falls  He uses a single-point cane and sometimes a walker for ambulation  Pain is worse with standing, bending, walking, exercise  And improves with heat or ice  Physical therapy and exercise have provided minimal to no significant improvement in her pain  Currently taking over-the-counter NSAIDs including Tylenol as well as tramadol and venlafaxine  Presents today for initial evaluation  I have personally reviewed and/or updated the patient's past medical history, past surgical history, family history, social history, current medications, allergies, and vital signs today  Review of Systems   Respiratory: Negative for shortness of breath  Cardiovascular: Negative for chest pain  Gastrointestinal: Negative for constipation, diarrhea, nausea and vomiting  Genitourinary: Positive for frequency  Musculoskeletal: Positive for back pain  Negative for arthralgias, gait problem, joint swelling and myalgias  Skin: Negative for rash  Neurological: Negative for dizziness, seizures and weakness  All other systems reviewed and are negative        Patient Active Problem List   Diagnosis    Benign essential hypertension    Major depression, chronic    Dyslipidemia    [facility] [address] [facility]    Class 3 severe obesity with body mass index (BMI) of 45 0 to 49 9 in adult [facility])    Acute deep vein thrombosis (DVT) of proximal vein of lower extremity ([facility] [address] )    Breast cancer screening    Encounter for central line care    Cancer related pain    Chemotherapy induced neutropenia (HCC)    Anemia    Dehydration    Chemotherapy-induced nausea    Hypokalemia    Hyponatremia    Generalized weakness    Bilateral lower extremity edema    Moderate protein-calorie malnutrition ([facility] [address] )    Ambulatory dysfunction    Hypomagnesemia    Obstruction of right ureter    Bacteremia       Past Medical History:   Diagnosis Date    Anemia     Chemotherapy follow-up examination     Depression     Endometrial cancer ([facility] [address] ) 12/2017    Hyperglycemia     vx type 2 dm -- last assessed 4/1/14; resolved 11/7/17    Hyperlipidemia     Hypertension     Obesity     last assessed 10/14/17; resolved 11/7/17       Past Surgical History:   Procedure Laterality Date    ABDOMINAL SURGERY      GASTRIC BYPASS    CHOLECYSTECTOMY      at the time of gastric bypass    COLONOSCOPY      CT NEEDLE BIOPSY LYMPH NODE  7/8/2019    FL GUIDED CENTRAL VENOUS ACCESS DEVICE INSERTION  11/12/2019    GASTRIC BYPASS      HYSTERECTOMY Bilateral     total abdominal with salpingo-oophorectomy    IR PICC LINE  9/27/2019    IR PORT PLACEMENT  7/26/2019    IR PORT REMOVAL  9/20/2019    IR TUBE PLACEMENT NEPHROSTOMY  7/26/2019    OOPHORECTOMY Bilateral     DC INSJ TUNNELED CTR VAD W/SUBQ PORT AGE 5 YR/> Left 11/12/2019    Procedure: INSERTION VENOUS PORT ( PORT-A-CATH) IR;  Surgeon: Mone Garcia DO;  Location: [facility];  Service: Interventional Radiology    DC LAP, RADICAL HYST W/ TUBE&OV, NODE BX N/A 12/19/2017    Procedure: HYSTERECTOMY LAPAROSCOPIC TOTAL ([address]) W/ ROBOTICS; BILATERAL SALPINGOOPHERECTOMY; LYMPH NODE DISSECTION; lysis of adhesions;  Surgeon: Arsh Fox MD;  Location: [facility];  Service: Gynecology Oncology    DC LAP,DIAGNOSTIC ABDOMEN N/A 12/19/2017    Procedure: LAPAROSCOPY DIAGNOSTIC;  Surgeon: Lulu Harper MD;  [facility];  Service: Gynecology Oncology    TONSILLECTOMY      US GUIDED BREAST BIOPSY RIGHT COMPLETE Right 6/28/2019       Family History   Problem Relation Age of Onset    Other Mother         dyslipidemia    Ovarian cancer Mother 48    Lymphoma Father     Bone cancer Maternal Grandfather     No Known Problems Sister     No Known Problems Maternal Grandmother     Uterine cancer Paternal Grandmother     No Known Problems Paternal Grandfather     No Known Problems Maternal Aunt     No Known Problems Maternal Aunt     No Known Problems Maternal Aunt     No Known Problems Maternal Aunt     No Known Problems Paternal Aunt     No Known Problems Paternal Aunt     No Known Problems Paternal Aunt     No Known Problems Paternal Aunt     No Known Problems Brother     No Known Problems Brother        Social History     Occupational History    Not on file   Tobacco Use    Smoking status: Never Smoker    Smokeless tobacco: Never Used   Substance and Sexual Activity    Alcohol use: Not Currently    Drug use: No    Sexual activity: Not Currently       Current Outpatient Medications on File Prior to Visit   Medication Sig    acetaminophen (TYLENOL) 325 mg tablet Take 2 tablets (650 mg total) by mouth every 6 (six) hours as needed for mild pain, headaches or fever    ARIPiprazole (ABILIFY) 10 mg tablet Take 10 mg by mouth daily    aspirin (ECOTRIN LOW STRENGTH) 81 mg EC tablet Take 81 mg by mouth daily    Calcium-Magnesium-Vitamin D (CALCIUM 500 PO) Take by mouth    cholecalciferol (VITAMIN D3) 1,000 units tablet Take 1,000 Units by mouth daily    Cyanocobalamin (VITAMIN B12 PO) Take by mouth    cyclobenzaprine (FLEXERIL) 10 mg tablet Take 1 tablet (10 mg total) by mouth daily at bedtime    enoxaparin (Lovenox) 120 mg/0 8 mL Inject 0 8 mL (120 mg total) under the skin daily    folic acid (FOLVITE) 1 mg tablet Take 1 tablet (1 mg total) by mouth daily    gemfibrozil (LOPID) 600 mg tablet TAKE 1 TABLET BY MOUTH EVERY DAY    hydrOXYzine HCL (ATARAX) 25 mg tablet Take 1 tablet (25 mg total) by mouth every 6 (six) hours as needed for itching    Multiple Vitamin (MULTIVITAMIN) tablet Take 1 tablet by mouth daily    MYRBETRIQ 50 MG TB24 TAKE 1 TABLET BY MOUTH EVERY DAY    omeprazole (PriLOSEC) 20 mg delayed release capsule Take 20 mg by mouth daily    ondansetron (ZOFRAN) 8 mg tablet Take 1 tablet (8 mg total) by mouth every 8 (eight) hours as needed for nausea or vomiting    oxybutynin (DITROPAN-XL) 10 MG 24 hr tablet Take 1 tablet (10 mg total) by mouth daily    quinapril (ACCUPRIL) 5 mg tablet TAKE 1 TABLET BY MOUTH EVERYDAY AT BEDTIME    saccharomyces boulardii (FLORASTOR) 250 mg capsule Take 1 capsule (250 mg total) by mouth 2 (two) times a day    traMADol (ULTRAM) 50 mg tablet Take 1 tablet (50 mg total) by mouth every 6 (six) hours as needed for moderate pain    venlafaxine (EFFEXOR) 75 mg tablet Take 75 mg by mouth 3 (three) times a day       No current facility-administered medications on file prior to visit  Allergies   Allergen Reactions    Cephalosporins Rash     Which Cephalosporin reaction was to not specified; however, has tolerated Amoxicillin, Cefazolin, and Cefepime       Physical Exam    BP 98/64   Pulse 69   Temp 98 1 °F (36 7 °C)   Ht 4' 11" (1 499 m)   Wt 88 5 kg (195 lb)   LMP  (LMP Unknown)   BMI 39 39 kg/m²     General: Well-developed, well-nourished individual in no acute distress  Mental: Appropriate mood and affect  Grossly oriented with coherent speech and thought processing  Neuro:  Cranial nerves: Cranial nerve function is grossly intact bilaterally  Strength: Bilateral lower extremity strength is normal and symmetric except for right ankle dorsiflexor 4+ out of 5  No atrophy or tone abnormalities noted    Reflexes: Bilateral lower extremity muscle stretch reflexes are physiologic and symmetric  No ankle clonus is noted  Sensation: No loss of sensation is noted  SLR/Foraminal Compression Maneuvers: Straight leg raising in the   supine position is negative  for radicular pain right lower extremity  Gait:  Gait/gross motor: Gait is ambulates with walker  Musculoskeletal:  Palpation: Inspection and palpation of the spine and extremities are remarkable for tenderness to palpation right-sided glutes  Spine:  Decreased active and passive range of motion with right lower extremity abduction and internal rotation limited by pain in the right buttock No gross axial skeletal deformities  Positive FADIR right lower extremity  Skin: Skin inspection grossly negative for erythema, breakdown, or concerning lesions in affected area  Lymph: No lymphadenopathy is appreciated in the involved extremity  Vessels: No lower extremity edema  Lungs: Breathing is comfortable and regular  No dyspnea noted during examination  Eyes: Visual field grossly intact to confrontation  No redness appreciated  ENT: No craniofacial deformities or asymmetry  No neck masses appreciated  Imaging;  MRI LUMBAR SPINE WITHOUT CONTRAST     INDICATION: M54 5: Low back pain  Right-sided low back pain and on right side of pelvis and right foot numbness  Neuropathy       COMPARISON:  6/9/2020; 7/4/2020      TECHNIQUE:  Sagittal T1, sagittal T2, sagittal inversion recovery, axial T1 and axial T2, coronal T2     IMAGE QUALITY:  Diagnostic     FINDINGS:     VERTEBRAL BODIES:  There are 5 lumbar type vertebral bodies  Mild levoscoliosis mid lumbar spine  Normal lordosis  Scattered degenerative endplate changes  No focally suspicious marrow lesions  No bone marrow edema or compression abnormality      SACRUM:  Perineural cyst is noted dorsally to the right of midline at the S2 level, image 12, series 401, measuring approximately 2 cm    There is associated smooth bony remodeling of the right neural foramen at the S2-3 level      DISTAL CORD AND CONUS:  Normal size and signal within the distal cord and conus  Conus medullaris terminates at the L1-2 intervertebral disc space      PARASPINAL SOFT TISSUES:  Well-circumscribed T2 hyperintense lesion arising from the posterior medial aspect of the right kidney measuring 2 6 cm, likely a renal cyst   This finding correlates to the recent CT of the abdomen and pelvis from July 4    right-sided percutaneous nephrostomy tube also redemonstrated  No hydronephrosis        LOWER THORACIC DISC SPACES:  Normal disc height and signal   No disc herniation, canal stenosis or foraminal narrowing      LUMBAR DISC SPACES:     L1-L2:  There is no focal disk herniation, central canal or neural foraminal narrowing  Bilateral facet hypertrophy noted      L2-L3:  There is a right neural foraminal disc herniation, protrusion type  There is bilateral facet hypertrophy  Mild right neural foraminal narrowing  Central canal and left neural foramen patent      L3-L4:  There is no focal disk herniation, central canal or neural foraminal narrowing  Bilateral facet hypertrophy noted      L4-L5:  There is a left neural foraminal disc herniation, protrusion type  Mild left neural foraminal narrowing  Central canal and right neural foramen patent      L5-S1:  There is a diffuse disk bulge  No significant central canal or neural foraminal narrowing  Bilateral facet hypertrophy noted      IMPRESSION:        1  Mild spondylosis  2   Prominent, 2 cm perineural cyst incidentally noted at the right S2-3 neural foramen with associated smooth bony remodeling of the right neural foramen at this level  3    Right renal system percutaneous nephrostomy tube redemonstrated, similar to the recent prior CT         Workstation performed: [id number]

## 2020-07-30 NOTE — PATIENT INSTRUCTIONS
Piriformis Syndrome   WHAT YOU NEED TO KNOW:   Piriformis syndrome is sciatic nerve pain caused by an injured or overused piriformis muscle  This is a muscle inside your buttocks that helps you move your leg  The pain is caused when this muscle pinches your sciatic nerve  You may feel the pain in your hip or down your leg  DISCHARGE INSTRUCTIONS:   Medicines: You may need any of the following:  · Prescription medicines  may be used to relax your muscles and decrease pain and swelling  · NSAIDs  help decrease swelling and pain  This medicine is available with or without a doctor's order  NSAIDs can cause stomach bleeding or kidney problems in certain people  If you take blood thinner medicine, always ask your healthcare provider if NSAIDs are safe for you  Always read the medicine label and follow directions  · Acetaminophen  decreases pain  It is available without a doctor's order  Ask how much to take and how often to take it  Follow directions  Acetaminophen can cause liver damage if not taken correctly  · Take your medicine as directed  Contact your healthcare provider if you think your medicine is not helping or if you have side effects  Tell him or her if you are allergic to any medicine  Keep a list of the medicines, vitamins, and herbs you take  Include the amounts, and when and why you take them  Bring the list or the pill bottles to follow-up visits  Carry your medicine list with you in case of an emergency  Follow up with your healthcare provider as directed: You may need to return for more tests  You may also be referred to a physical therapist  Write down your questions so you remember to ask them during your visits  Manage your symptoms of piriformis syndrome:   · Rest as directed  Avoid activities that make your pain worse  · Apply ice to the buttock on your injured side  Use an ice pack, or put crushed ice in a plastic bag  Cover it with a towel   Leave the ice on for 15 to 20 minutes every hour, or as directed  Ice helps prevent tissue damage and decreases swelling and pain  · Apply heat to the buttock on your injured side  Use heating pads for 20 to 30 minutes every 2 hours for as many days as directed  Heat helps decrease pain and muscle spasms  · Stretch as directed  Lie on your back with your knees bent  Place the ankle of your injured leg on the knee of your other leg  Gently pull your bent leg toward your chest, until you feel a stretch in the buttock of your injured leg  A physical therapist may show you other exercises to stretch and strengthen your hip muscles  Contact your healthcare provider if:   · Your pain worsens or returns, even with treatment  · You have questions or concerns about your condition or care  Return to the emergency department if:   · You cannot move your leg or foot  © 2017 [facility] Information is for End User's use only and may not be sold, redistributed or otherwise used for commercial purposes  All illustrations and images included in CareNotes® are the copyrighted property of [employer]  or [employer]  The above information is an  only  It is not intended as medical advice for individual conditions or treatments  Talk to your doctor, nurse or pharmacist before following any medical regimen to see if it is safe and effective for you

## 2020-08-05 ENCOUNTER — OFFICE VISIT (OUTPATIENT)
Dept: GYNECOLOGIC ONCOLOGY | Facility: CLINIC | Age: 65
End: 2020-08-05
Payer: COMMERCIAL

## 2020-08-05 VITALS
HEIGHT: 59 IN | RESPIRATION RATE: 18 BRPM | SYSTOLIC BLOOD PRESSURE: 112 MMHG | DIASTOLIC BLOOD PRESSURE: 70 MMHG | HEART RATE: 78 BPM | BODY MASS INDEX: 39.15 KG/M2 | WEIGHT: 194.2 LBS | TEMPERATURE: 98.6 F

## 2020-08-05 DIAGNOSIS — N13.5 OBSTRUCTION OF RIGHT URETER: ICD-10-CM

## 2020-08-05 DIAGNOSIS — I82.4Y9 ACUTE DEEP VEIN THROMBOSIS (DVT) OF PROXIMAL VEIN OF LOWER EXTREMITY, UNSPECIFIED LATERALITY (HCC): ICD-10-CM

## 2020-08-05 DIAGNOSIS — C54.1 ENDOMETRIAL CANCER (HCC): Primary | Chronic | ICD-10-CM

## 2020-08-05 PROCEDURE — 99214 OFFICE O/P EST MOD 30 MIN: CPT | Performed by: OBSTETRICS & GYNECOLOGY

## 2020-08-05 PROCEDURE — 1036F TOBACCO NON-USER: CPT | Performed by: OBSTETRICS & GYNECOLOGY

## 2020-08-05 PROCEDURE — 3008F BODY MASS INDEX DOCD: CPT | Performed by: OBSTETRICS & GYNECOLOGY

## 2020-08-05 PROCEDURE — 3078F DIAST BP <80 MM HG: CPT | Performed by: OBSTETRICS & GYNECOLOGY

## 2020-08-05 PROCEDURE — 3074F SYST BP LT 130 MM HG: CPT | Performed by: OBSTETRICS & GYNECOLOGY

## 2020-08-05 NOTE — ASSESSMENT & PLAN NOTE
This occurred in the setting of right pelvic sidewall compression from recurrent endometrial cancer  If/once patient's determined to have no evidence of disease, 1 could consider discontinuation of systemic anticoagulation  Will await for upcoming imaging tests had discussed at next visit

## 2020-08-05 NOTE — PROGRESS NOTES
Assessment/Plan:    Problem List Items Addressed This Visit        Cardiovascular and Mediastinum    Acute deep vein thrombosis (DVT) of proximal vein of lower extremity (HCC)     This occurred in the setting of right pelvic sidewall compression from recurrent endometrial cancer  If/once patient's determined to have no evidence of disease, 1 could consider discontinuation of systemic anticoagulation  Will await for upcoming imaging tests had discussed at next visit  Genitourinary    Endometrial cancer ([facility] ) - Primary (Chronic)     The presence or active since of disease is at this time unclear  Patient has new onset pelvic pain and recent CT scan shows some evidence of thickening around rectosigmoid colon/pelvic organs  Patient with certainly benefit from PET-CT in order to identify presence or absence of active disease  In the absence of metabolically active disease, the patient can be considered no evidence of disease and return to our office in 3 months  Relevant Orders    NM pet ct tumor imaging whole body    Obstruction of right ureter     PCNU in place  Plan for anterograde nephrostogram at the time of next exchange in September  If anterograde flow is documented, PCN you could be internalized or removed with short-term imaging/BMP follow-up  CHIEF COMPLAINT:   Follow-up for endometrial cancer, lower back/sacral pain suspected musculoskeletal    Problem:  Cancer Staging  Endometrial cancer ([facility] [place] )  Staging form: Corpus Uteri - Carcinoma, AJCC 7th Edition  - Clinical stage from 12/19/2017: FIGO Stage IB (T1b, N0, M0) - Signed by Jonny Miranda MD on 2/12/2018        Previous therapy:  Oncology History   Follow up post adjuvant external beam radiation therapy to the whole pelvis followed by boost to gross disease for recurrent right pelvic adenocarcinoma completed completed on 4/13/20 5/6/20 Lg Calzada MD  Right percutaneous nephrostomy tube with clear urine  Negative GYN exam    5/13/20 PET scheduled  7/1/20 IR for tube change  8/5/20 Felix Samayoa MD       Endometrial cancer [facility])   11/17/2017 Initial Diagnosis    Endometrial cancer ([facility] )     11/17/2017 Biopsy    ENDOMETRIAL BIOPSY: WELL DIFFERENTIATED ENDOMETRIAL ADENOCARCINOMA (FIGO I) WITH FOCALMUCINOUS FEATURES  Part B: ENDOCERVICAL POLYP:BENIGN ENDOCERVICAL      12/19/2017 Surgery    Robotic assisted total laparoscopic hysterectomy with bilateral salpingo-oophorectomy and sentinel bilateral pelvic lymph node dissection  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 (4 4 x 3 2 cm tumor, 9 4/15 4mm invasion, NO LVSI, washings revealed atypical cellular changes)     12/19/2017 Genetic Testing    Morrison testing negative     6/28/2019 Biopsy    A  Breast, Right, US BX Right Breast 1000 4 cmfn:  - Benign breast tissue with focal histiocytic aggregate  See comment   - Negative for atypia and in-situ or invasive carcinoma  7/8/2019 Recurrence    Presented with right lower extremity DVT and CT demonstrating right pelvic sidewall mass with venous, ureteral and nerve compression causing significant neuropathic pain  Biopsy:  Lymph Node, Right pelvic lymph node x3:  - High-grade adenocarcinoma  7/30/2019 - 1/6/2020 Chemotherapy    Taxol 175 mg/m2 and carboplatin AUC 6 every 21 days  Dose was reduced to taxol 135 mg/m2 and carboplatin AUC 5  Completed 6 cycles  Treatment protracted due to multiple hospital admissions  2/24/2020 - 4/13/2020 Radiation    adjuvant external beam radiation therapy to the whole pelvis to 4500 cGy followed by boost to gross disease of an additional 2200 cGy           Patient ID: Rene Tam is a 60 y o  female  HPI  Patient is status post chemotherapy and tumor directed radiotherapy for right pelvic sidewall recurrence of stage I B2 grade 1 endometrial cancer  She completed radiotherapy in April 2020   CT scan in July performed in the emergency department showed no evidence of pelvic sidewall disease  However some nonspecific changes read as possible diverticulitis demonstrating thickening of peritoneal surfaces in the pelvis were noted  Patient now has right medial buttock/lower back pain which has been evaluated by [facility] via MRI  This is suspected to be musculoskeletal   She is scheduled for a steroid injection later this month  Denies vaginal bleeding, drainage or discharge  No changes in bowel or bladder function  Right PCNU in place draining clear urine  The following portions of the patient's history were reviewed and updated as appropriate: allergies, current medications, past family history, past medical history, past social history, past surgical history and problem list     Review of Systems  As above  Twelve point review of systems otherwise unremarkable    Current Outpatient Medications   Medication Sig Dispense Refill    acetaminophen (TYLENOL) 325 mg tablet Take 2 tablets (650 mg total) by mouth every 6 (six) hours as needed for mild pain, headaches or fever 30 tablet 0    ARIPiprazole (ABILIFY) 10 mg tablet Take 10 mg by mouth daily      aspirin (ECOTRIN LOW STRENGTH) 81 mg EC tablet Take 81 mg by mouth daily      Calcium-Magnesium-Vitamin D (CALCIUM 500 PO) Take by mouth      cholecalciferol (VITAMIN D3) 1,000 units tablet Take 1,000 Units by mouth daily      Cyanocobalamin (VITAMIN B12 PO) Take by mouth      enoxaparin (Lovenox) 120 mg/0 8 mL Inject 0 8 mL (120 mg total) under the skin daily 30 Syringe 3    folic acid (FOLVITE) 1 mg tablet Take 1 tablet (1 mg total) by mouth daily  0    gemfibrozil (LOPID) 600 mg tablet TAKE 1 TABLET BY MOUTH EVERY DAY 90 tablet 1    Multiple Vitamin (MULTIVITAMIN) tablet Take 1 tablet by mouth daily      MYRBETRIQ 50 MG TB24 TAKE 1 TABLET BY MOUTH EVERY DAY 90 tablet 3    omeprazole (PriLOSEC) 20 mg delayed release capsule Take 20 mg by mouth daily      oxybutynin (DITROPAN-XL) 10 MG 24 hr tablet Take 1 tablet (10 mg total) by mouth daily 90 tablet 3    quinapril (ACCUPRIL) 5 mg tablet TAKE 1 TABLET BY MOUTH EVERYDAY AT BEDTIME 90 tablet 1    saccharomyces boulardii (FLORASTOR) 250 mg capsule Take 1 capsule (250 mg total) by mouth 2 (two) times a day  0    traMADol (ULTRAM) 50 mg tablet Take 1 tablet (50 mg total) by mouth every 6 (six) hours as needed for moderate pain 60 tablet 0    venlafaxine (EFFEXOR) 75 mg tablet Take 75 mg by mouth 3 (three) times a day        cyclobenzaprine (FLEXERIL) 10 mg tablet Take 1 tablet (10 mg total) by mouth daily at bedtime (Patient not taking: Reported on 8/5/2020) 30 tablet 1    hydrOXYzine HCL (ATARAX) 25 mg tablet Take 1 tablet (25 mg total) by mouth every 6 (six) hours as needed for itching (Patient not taking: Reported on 8/5/2020) 30 tablet 0    ondansetron (ZOFRAN) 8 mg tablet Take 1 tablet (8 mg total) by mouth every 8 (eight) hours as needed for nausea or vomiting (Patient not taking: Reported on 8/5/2020) 30 tablet 1     No current facility-administered medications for this visit  Objective:    Blood pressure 112/70, pulse 78, temperature 98 6 °F (37 °C), temperature source Tympanic, resp  rate 18, height 4' 11" (1 499 m), weight 88 1 kg (194 lb 3 2 oz), not currently breastfeeding  Body mass index is 39 22 kg/m²  Body surface area is 1 82 meters squared  Physical Exam   Constitutional: She does not appear ill  No distress  Eyes: Conjunctivae are normal  Right eye exhibits no discharge  Left eye exhibits no discharge  No scleral icterus  Neck: Neck supple  No muscular tenderness present  No neck rigidity  Cardiovascular: Normal rate, regular rhythm and normal heart sounds  No murmur heard  Pulmonary/Chest: Effort normal  No stridor  No respiratory distress  She has no wheezes  Abdominal: Normal appearance  She exhibits no distension and no mass  There is no abdominal tenderness   There is no guarding  Genitourinary:    Genitourinary Comments: Normal external female genitalia  Normal Bartholin's and [place]'s glands  Normal urethral meatus and no evidence of urethral discharge or masses  Bladder without fullness mass or tenderness  Vagina is atrophic without lesion or discharge  No significant pelvic organ prolapse noted  Cervix and uterus are surgically absent  Bimanual exam demonstrates no evidence of pelvic masses  Anus without fissure of lesion  Lymphadenopathy:     She has no cervical adenopathy  Neurological: She is alert  Vitals reviewed      Florence Santana MD, [facility]  8/5/2020  2:01 PM

## 2020-08-05 NOTE — ASSESSMENT & PLAN NOTE
PCNU in place  Plan for anterograde nephrostogram at the time of next exchange in September  If anterograde flow is documented, PCN you could be internalized or removed with short-term imaging/BMP follow-up

## 2020-08-06 ENCOUNTER — HOSPITAL ENCOUNTER (INPATIENT)
Facility: HOSPITAL | Age: 65
LOS: 2 days | Discharge: HOME | DRG: 552 | End: 2020-08-08
Attending: EMERGENCY MEDICINE | Admitting: INTERNAL MEDICINE
Payer: COMMERCIAL

## 2020-08-06 ENCOUNTER — TELEPHONE (OUTPATIENT)
Dept: RADIATION ONCOLOGY | Facility: HOSPITAL | Age: 65
End: 2020-08-06

## 2020-08-06 ENCOUNTER — APPOINTMENT (EMERGENCY)
Dept: CT IMAGING | Facility: HOSPITAL | Age: 65
DRG: 552 | End: 2020-08-06
Payer: COMMERCIAL

## 2020-08-06 DIAGNOSIS — R10.9 LEFT FLANK PAIN: Primary | ICD-10-CM

## 2020-08-06 DIAGNOSIS — N39.0 UTI (URINARY TRACT INFECTION): ICD-10-CM

## 2020-08-06 PROBLEM — M54.50 ACUTE LEFT-SIDED LOW BACK PAIN WITHOUT SCIATICA: Status: ACTIVE | Noted: 2020-08-06

## 2020-08-06 PROBLEM — N32.81 OVERACTIVE BLADDER: Status: ACTIVE | Noted: 2020-08-06

## 2020-08-06 LAB
ALBUMIN SERPL BCP-MCNC: 3.8 G/DL (ref 3.5–5)
ALP SERPL-CCNC: 137 U/L (ref 46–116)
ALT SERPL W P-5'-P-CCNC: 17 U/L (ref 12–78)
ANION GAP SERPL CALCULATED.3IONS-SCNC: 8 MMOL/L (ref 4–13)
AST SERPL W P-5'-P-CCNC: 5 U/L (ref 5–45)
BACTERIA UR QL AUTO: ABNORMAL /HPF
BASOPHILS # BLD AUTO: 0.03 THOUSANDS/ΜL (ref 0–0.1)
BASOPHILS NFR BLD AUTO: 1 % (ref 0–1)
BILIRUB SERPL-MCNC: 0.24 MG/DL (ref 0.2–1)
BILIRUB UR QL STRIP: NEGATIVE
BUN SERPL-MCNC: 23 MG/DL (ref 5–25)
CALCIUM SERPL-MCNC: 9.7 MG/DL (ref 8.3–10.1)
CHLORIDE SERPL-SCNC: 106 MMOL/L (ref 100–108)
CLARITY UR: ABNORMAL
CO2 SERPL-SCNC: 26 MMOL/L (ref 21–32)
COLOR UR: YELLOW
CREAT SERPL-MCNC: 1 MG/DL (ref 0.6–1.3)
EOSINOPHIL # BLD AUTO: 0.02 THOUSAND/ΜL (ref 0–0.61)
EOSINOPHIL NFR BLD AUTO: 0 % (ref 0–6)
ERYTHROCYTE [DISTWIDTH] IN BLOOD BY AUTOMATED COUNT: 13.2 % (ref 11.6–15.1)
GFR SERPL CREATININE-BSD FRML MDRD: 61 ML/MIN/1.73SQ M
GLUCOSE SERPL-MCNC: 98 MG/DL (ref 65–140)
GLUCOSE UR STRIP-MCNC: NEGATIVE MG/DL
HCT VFR BLD AUTO: 33.1 % (ref 34.8–46.1)
HGB BLD-MCNC: 10.4 G/DL (ref 11.5–15.4)
HGB UR QL STRIP.AUTO: ABNORMAL
IMM GRANULOCYTES # BLD AUTO: 0.08 THOUSAND/UL (ref 0–0.2)
IMM GRANULOCYTES NFR BLD AUTO: 2 % (ref 0–2)
KETONES UR STRIP-MCNC: NEGATIVE MG/DL
LEUKOCYTE ESTERASE UR QL STRIP: ABNORMAL
LIPASE SERPL-CCNC: 79 U/L (ref 73–393)
LYMPHOCYTES # BLD AUTO: 0.66 THOUSANDS/ΜL (ref 0.6–4.47)
LYMPHOCYTES NFR BLD AUTO: 13 % (ref 14–44)
MCH RBC QN AUTO: 30.1 PG (ref 26.8–34.3)
MCHC RBC AUTO-ENTMCNC: 31.4 G/DL (ref 31.4–37.4)
MCV RBC AUTO: 96 FL (ref 82–98)
MONOCYTES # BLD AUTO: 0.54 THOUSAND/ΜL (ref 0.17–1.22)
MONOCYTES NFR BLD AUTO: 11 % (ref 4–12)
NEUTROPHILS # BLD AUTO: 3.79 THOUSANDS/ΜL (ref 1.85–7.62)
NEUTS SEG NFR BLD AUTO: 73 % (ref 43–75)
NITRITE UR QL STRIP: NEGATIVE
NON-SQ EPI CELLS URNS QL MICRO: ABNORMAL /HPF
NRBC BLD AUTO-RTO: 0 /100 WBCS
OTHER STN SPEC: ABNORMAL
PH UR STRIP.AUTO: 5.5 [PH] (ref 4.5–8)
PLATELET # BLD AUTO: 251 THOUSANDS/UL (ref 149–390)
PMV BLD AUTO: 9.4 FL (ref 8.9–12.7)
POTASSIUM SERPL-SCNC: 4.3 MMOL/L (ref 3.5–5.3)
PROT SERPL-MCNC: 7.9 G/DL (ref 6.4–8.2)
PROT UR STRIP-MCNC: ABNORMAL MG/DL
RBC # BLD AUTO: 3.45 MILLION/UL (ref 3.81–5.12)
RBC #/AREA URNS AUTO: ABNORMAL /HPF
SODIUM SERPL-SCNC: 140 MMOL/L (ref 136–145)
SP GR UR STRIP.AUTO: 1.01 (ref 1–1.03)
UROBILINOGEN UR QL STRIP.AUTO: 0.2 E.U./DL
WBC # BLD AUTO: 5.12 THOUSAND/UL (ref 4.31–10.16)
WBC #/AREA URNS AUTO: ABNORMAL /HPF

## 2020-08-06 PROCEDURE — 83690 ASSAY OF LIPASE: CPT | Performed by: EMERGENCY MEDICINE

## 2020-08-06 PROCEDURE — 74177 CT ABD & PELVIS W/CONTRAST: CPT

## 2020-08-06 PROCEDURE — 96375 TX/PRO/DX INJ NEW DRUG ADDON: CPT

## 2020-08-06 PROCEDURE — 96376 TX/PRO/DX INJ SAME DRUG ADON: CPT

## 2020-08-06 PROCEDURE — G1004 CDSM NDSC: HCPCS

## 2020-08-06 PROCEDURE — 81001 URINALYSIS AUTO W/SCOPE: CPT

## 2020-08-06 PROCEDURE — 80053 COMPREHEN METABOLIC PANEL: CPT | Performed by: EMERGENCY MEDICINE

## 2020-08-06 PROCEDURE — 99285 EMERGENCY DEPT VISIT HI MDM: CPT

## 2020-08-06 PROCEDURE — 85025 COMPLETE CBC W/AUTO DIFF WBC: CPT | Performed by: EMERGENCY MEDICINE

## 2020-08-06 PROCEDURE — 71275 CT ANGIOGRAPHY CHEST: CPT

## 2020-08-06 PROCEDURE — 96374 THER/PROPH/DIAG INJ IV PUSH: CPT

## 2020-08-06 PROCEDURE — 87077 CULTURE AEROBIC IDENTIFY: CPT

## 2020-08-06 PROCEDURE — 87086 URINE CULTURE/COLONY COUNT: CPT

## 2020-08-06 PROCEDURE — 99285 EMERGENCY DEPT VISIT HI MDM: CPT | Performed by: EMERGENCY MEDICINE

## 2020-08-06 PROCEDURE — 36415 COLL VENOUS BLD VENIPUNCTURE: CPT | Performed by: EMERGENCY MEDICINE

## 2020-08-06 PROCEDURE — 87186 SC STD MICRODIL/AGAR DIL: CPT

## 2020-08-06 PROCEDURE — 99223 1ST HOSP IP/OBS HIGH 75: CPT | Performed by: INTERNAL MEDICINE

## 2020-08-06 RX ORDER — ONDANSETRON 4 MG/1
4 TABLET, ORALLY DISINTEGRATING ORAL EVERY 6 HOURS PRN
Status: DISCONTINUED | OUTPATIENT
Start: 2020-08-06 | End: 2020-08-08 | Stop reason: HOSPADM

## 2020-08-06 RX ORDER — LEVOFLOXACIN 5 MG/ML
750 INJECTION, SOLUTION INTRAVENOUS ONCE
Status: COMPLETED | OUTPATIENT
Start: 2020-08-06 | End: 2020-08-06

## 2020-08-06 RX ORDER — DOCUSATE SODIUM 100 MG/1
100 CAPSULE, LIQUID FILLED ORAL 2 TIMES DAILY PRN
Status: DISCONTINUED | OUTPATIENT
Start: 2020-08-06 | End: 2020-08-08 | Stop reason: HOSPADM

## 2020-08-06 RX ORDER — VENLAFAXINE 37.5 MG/1
75 TABLET ORAL 3 TIMES DAILY
Status: DISCONTINUED | OUTPATIENT
Start: 2020-08-06 | End: 2020-08-08 | Stop reason: HOSPADM

## 2020-08-06 RX ORDER — LISINOPRIL 5 MG/1
5 TABLET ORAL DAILY
Status: DISCONTINUED | OUTPATIENT
Start: 2020-08-06 | End: 2020-08-08 | Stop reason: HOSPADM

## 2020-08-06 RX ORDER — MELATONIN
1000 DAILY
Status: DISCONTINUED | OUTPATIENT
Start: 2020-08-06 | End: 2020-08-08 | Stop reason: HOSPADM

## 2020-08-06 RX ORDER — ARIPIPRAZOLE 5 MG/1
10 TABLET ORAL DAILY
Status: DISCONTINUED | OUTPATIENT
Start: 2020-08-06 | End: 2020-08-08 | Stop reason: HOSPADM

## 2020-08-06 RX ORDER — SENNOSIDES 8.6 MG
1 TABLET ORAL
Status: DISCONTINUED | OUTPATIENT
Start: 2020-08-06 | End: 2020-08-08 | Stop reason: HOSPADM

## 2020-08-06 RX ORDER — FOLIC ACID 1 MG/1
1 TABLET ORAL DAILY
Status: DISCONTINUED | OUTPATIENT
Start: 2020-08-06 | End: 2020-08-08 | Stop reason: HOSPADM

## 2020-08-06 RX ORDER — HYDROMORPHONE HCL/PF 1 MG/ML
0.5 SYRINGE (ML) INJECTION ONCE
Status: COMPLETED | OUTPATIENT
Start: 2020-08-06 | End: 2020-08-06

## 2020-08-06 RX ORDER — TRAMADOL HYDROCHLORIDE 50 MG/1
50 TABLET ORAL EVERY 6 HOURS PRN
Status: DISCONTINUED | OUTPATIENT
Start: 2020-08-06 | End: 2020-08-08 | Stop reason: HOSPADM

## 2020-08-06 RX ORDER — HYDROMORPHONE HCL/PF 1 MG/ML
0.5 SYRINGE (ML) INJECTION EVERY 4 HOURS PRN
Status: DISCONTINUED | OUTPATIENT
Start: 2020-08-06 | End: 2020-08-08 | Stop reason: HOSPADM

## 2020-08-06 RX ORDER — GEMFIBROZIL 600 MG/1
600 TABLET, FILM COATED ORAL DAILY
Status: DISCONTINUED | OUTPATIENT
Start: 2020-08-06 | End: 2020-08-08 | Stop reason: HOSPADM

## 2020-08-06 RX ORDER — ACETAMINOPHEN 325 MG/1
975 TABLET ORAL EVERY 8 HOURS PRN
Status: DISCONTINUED | OUTPATIENT
Start: 2020-08-06 | End: 2020-08-08 | Stop reason: HOSPADM

## 2020-08-06 RX ORDER — ACETAMINOPHEN 325 MG/1
975 TABLET ORAL EVERY 8 HOURS PRN
Status: DISCONTINUED | OUTPATIENT
Start: 2020-08-06 | End: 2020-08-06

## 2020-08-06 RX ORDER — METHOCARBAMOL 500 MG/1
500 TABLET, FILM COATED ORAL EVERY 6 HOURS PRN
Status: DISCONTINUED | OUTPATIENT
Start: 2020-08-06 | End: 2020-08-08

## 2020-08-06 RX ORDER — LEVOFLOXACIN 5 MG/ML
750 INJECTION, SOLUTION INTRAVENOUS EVERY 24 HOURS
Status: DISCONTINUED | OUTPATIENT
Start: 2020-08-07 | End: 2020-08-07

## 2020-08-06 RX ORDER — ASPIRIN 81 MG/1
81 TABLET ORAL DAILY
Status: DISCONTINUED | OUTPATIENT
Start: 2020-08-06 | End: 2020-08-08 | Stop reason: HOSPADM

## 2020-08-06 RX ORDER — OXYCODONE HYDROCHLORIDE 5 MG/1
5 TABLET ORAL EVERY 6 HOURS PRN
Status: DISCONTINUED | OUTPATIENT
Start: 2020-08-06 | End: 2020-08-08 | Stop reason: HOSPADM

## 2020-08-06 RX ORDER — PANTOPRAZOLE SODIUM 40 MG/1
40 TABLET, DELAYED RELEASE ORAL
Status: DISCONTINUED | OUTPATIENT
Start: 2020-08-07 | End: 2020-08-08 | Stop reason: HOSPADM

## 2020-08-06 RX ADMIN — VENLAFAXINE 75 MG: 37.5 TABLET ORAL at 21:28

## 2020-08-06 RX ADMIN — VENLAFAXINE 75 MG: 37.5 TABLET ORAL at 17:13

## 2020-08-06 RX ADMIN — LEVOFLOXACIN 750 MG: 5 INJECTION, SOLUTION INTRAVENOUS at 12:11

## 2020-08-06 RX ADMIN — OXYCODONE HYDROCHLORIDE 5 MG: 5 TABLET ORAL at 18:14

## 2020-08-06 RX ADMIN — Medication 1000 UNITS: at 14:55

## 2020-08-06 RX ADMIN — HYDROMORPHONE HYDROCHLORIDE 0.5 MG: 1 INJECTION, SOLUTION INTRAMUSCULAR; INTRAVENOUS; SUBCUTANEOUS at 07:33

## 2020-08-06 RX ADMIN — HYDROMORPHONE HYDROCHLORIDE 0.5 MG: 1 INJECTION, SOLUTION INTRAMUSCULAR; INTRAVENOUS; SUBCUTANEOUS at 08:53

## 2020-08-06 RX ADMIN — METHOCARBAMOL TABLETS 500 MG: 500 TABLET, COATED ORAL at 17:13

## 2020-08-06 RX ADMIN — TRAMADOL HYDROCHLORIDE 50 MG: 50 TABLET, FILM COATED ORAL at 19:17

## 2020-08-06 RX ADMIN — LISINOPRIL 5 MG: 5 TABLET ORAL at 14:55

## 2020-08-06 RX ADMIN — ARIPIPRAZOLE 10 MG: 5 TABLET ORAL at 14:55

## 2020-08-06 RX ADMIN — ASPIRIN 81 MG: 81 TABLET, COATED ORAL at 14:55

## 2020-08-06 RX ADMIN — IOHEXOL 100 ML: 350 INJECTION, SOLUTION INTRAVENOUS at 07:46

## 2020-08-06 RX ADMIN — HYDROMORPHONE HYDROCHLORIDE 0.5 MG: 1 INJECTION, SOLUTION INTRAMUSCULAR; INTRAVENOUS; SUBCUTANEOUS at 11:55

## 2020-08-06 RX ADMIN — FOLIC ACID 1 MG: 1 TABLET ORAL at 14:55

## 2020-08-06 RX ADMIN — HYDROMORPHONE HYDROCHLORIDE 0.5 MG: 1 INJECTION, SOLUTION INTRAMUSCULAR; INTRAVENOUS; SUBCUTANEOUS at 21:21

## 2020-08-06 RX ADMIN — ENOXAPARIN SODIUM 120 MG: 120 INJECTION SUBCUTANEOUS at 14:55

## 2020-08-06 RX ADMIN — GEMFIBROZIL 600 MG: 600 TABLET ORAL at 15:26

## 2020-08-06 NOTE — ASSESSMENT & PLAN NOTE
· Patient has left flank pain for 1 day in the setting of lower back pain for 1 week  · Afebrile but has been taking tylenol for a week for back pain  · Has percutaneous nephrostomy on right secondary to endometrial cancer spread  Admits to less output from tube  · UA positive for WBCs, bacteria and blood  Negative nitrites  · Urine culture in 01/2020 grew Klebsiella that was resistant to PCN and Cephalosporins  · CT scan remarkable for increased bladder wall thickening indicative for UTI  · Received Levaquin 750 mg once in ED    Plan:  · Continue Levaquin 750 mg po qd x 5 days    · Hold oxybutynin and Mybetriq  · Urine culture pending

## 2020-08-06 NOTE — ASSESSMENT & PLAN NOTE
· Patient has hx of endometrial cancer with right ureter involvement  · Has percutaneous nephrostomy tube on the right  · Last follow up with GYN ONC was 08/05/2020  · Not on chemotherapy currently  · Stable    Plan:  · Stable patient to follow up outpatient with GYN ONC prn

## 2020-08-06 NOTE — ASSESSMENT & PLAN NOTE
· Patient has hx of HTN that is well controlled on Quinipril 5 mg qd      Plan:  · Lisinopril 5 mg qd for HTN  · Monitor BP

## 2020-08-06 NOTE — H&P
H&P- Love Lovetti 1955, 60 y o  female MRN: [id number]    Unit/Bed#: W -01 Encounter: [id number]    Primary Care Provider: Jose L Peralta DO   Date and time admitted to hospital: 8/6/2020  6:31 AM        * UTI (urinary tract infection)  Assessment & Plan  · Patient has left flank pain for 1 day in the setting of lower back pain for 1 week  · Afebrile but has been taking tylenol for a week for back pain  · Has percutaneous nephrostomy on right secondary to endometrial cancer spread  Admits to less output from tube  · UA positive for WBCs, bacteria and blood  Negative nitrites  · Urine culture in 01/2020 grew Klebsiella that was resistant to PCN and Cephalosporins  · CT scan remarkable for increased bladder wall thickening indicative for UTI  · Received Levaquin 750 mg once in ED    Plan:  · Continue Levaquin 750 mg po qd x 5 days  · Hold oxybutynin and Mybetriq  · Urine culture pending    Acute deep vein thrombosis (DVT) of proximal vein of lower extremity ([facility] [address] )  Assessment & Plan  · Patient has hx of DVT  · On Lovenox 120 mg    Plan:  · Continue Lovenox 120 mg SC    Overactive bladder  Assessment & Plan  · Hx of over active bladder and increased urinary frequency  · Oxybutynin and Mybetriq po qd  · CT Scan showed increased bladder wall thickening form UTI  Plan:  · Hold Oxybutynin and Mybetriq due to UTI and bladder wall thickening due to UTI  Endometrial cancer [facility])  Assessment & Plan  · Patient has hx of endometrial cancer with right ureter involvement  · Has percutaneous nephrostomy tube on the right  · Last follow up with GYN ONC was 08/05/2020  · Not on chemotherapy currently  · Stable    Plan:  · Stable patient to follow up outpatient with GYN ONC prn  Acute left-sided low back pain without sciatica  Assessment & Plan  · Patient has had left lower back pain for a week  · Patient has been taking Tramadol and Tylenol for pain with minimum relief    · CT scan was negative for acute pathology  · Dilaudid given once in the ED for pain  Plan:  · Continue Tylenol for mild pain  · Tramadol for moderate pain  · Dialudid for breakthrough pain  · Robaxin 500 mg po q6h prn pain     Major depression, chronic  Assessment & Plan  · Hx of depression  · Denies being currently depressed or sad  Plan:  · Continue Effexor 75 mg po qd  · Continue Abilify 10 mg qd    Benign essential hypertension  Assessment & Plan  · Patient has hx of HTN that is well controlled on Quinipril 5 mg qd  Plan:  · Lisinopril 5 mg qd for HTN  · Monitor BP     VTE Prophylaxis: Enoxaparin (Lovenox)  / foot pump applied   Code Status: Level 1 - FULL CODE  POLST: POLST form is not discussed and not completed at this time  Anticipated Length of Stay:  Patient will be admitted on an Inpatient basis with an anticipated length of stay of  > 2 midnights  Justification for Hospital Stay: 48 hours  Complicated UTI    Chief Complaint:   Left Flank Pain    History of Present Illness:    Heidi Orozco is a 60 y o  female with PMH of Endometrial cancer not on chemotherapy, DVT, Over Active bladder, Depression , HTN,  who presents  with moderate to severe, 5 -6/10 dull, intermittent pain on left flank area that started yesterday morning when she woke up  She felt like she had pulled a muscle  Pain did not radiate  Admits to urinary frequency with no dysuria, hematuria, nocturia  Has nephrostomy tube on the right  No increase in frequency in emptying the tube  She states that the tube has not filled up as much today  Patient denies fever but has been on tylenol frequently for back pain  Back pain started a month ago  No chills, hematuria, abdominal pain, nausea or vomiting  No diarrhea or constipation  In the ED patient was hemodynamically stable  CT scan was positive for thickened urinary bladder indicative of possible UTI  UA was positive foe numerous WBCs, bacteria and small amount of blood   Patient was treated with Levaquin 750 mg po qd  She had Dilaudid for pain  Review of Systems:    Review of Systems   Constitutional: Negative for activity change, appetite change, fatigue, fever and unexpected weight change  HENT: Negative  Eyes: Negative for photophobia and visual disturbance  Respiratory: Negative for chest tightness and shortness of breath  Cardiovascular: Negative for chest pain, palpitations and leg swelling  Gastrointestinal: Negative for abdominal pain, blood in stool, constipation and nausea  Endocrine: Negative for cold intolerance  Genitourinary: Positive for decreased urine volume, frequency and urgency  Negative for dysuria and hematuria  Musculoskeletal: Positive for back pain (right)  Skin: Negative  Neurological: Negative for dizziness, weakness, numbness and headaches  Psychiatric/Behavioral: Negative for agitation, behavioral problems and confusion         Past Medical and Surgical History:     Past Medical History:   Diagnosis Date    Anemia     Chemotherapy follow-up examination     Depression     Endometrial cancer ([facility] [address] ) 12/2017    Hyperglycemia     vx type 2 dm -- last assessed 4/1/14; resolved 11/7/17    Hyperlipidemia     Hypertension     Obesity     last assessed 10/14/17; resolved 11/7/17       Past Surgical History:   Procedure Laterality Date    ABDOMINAL SURGERY      GASTRIC BYPASS    CHOLECYSTECTOMY      at the time of gastric bypass    COLONOSCOPY      CT NEEDLE BIOPSY LYMPH NODE  7/8/2019    FL GUIDED CENTRAL VENOUS ACCESS DEVICE INSERTION  11/12/2019    GASTRIC BYPASS      HYSTERECTOMY Bilateral     total abdominal with salpingo-oophorectomy    IR PICC LINE  9/27/2019    IR PORT PLACEMENT  7/26/2019    IR PORT REMOVAL  9/20/2019    IR TUBE PLACEMENT NEPHROSTOMY  7/26/2019    OOPHORECTOMY Bilateral     IL INSJ TUNNELED CTR VAD W/SUBQ PORT AGE 5 YR/> Left 11/12/2019    Procedure: INSERTION VENOUS PORT ( PORT-A-CATH) IR;  Surgeon: Ashlie Heard DO;  [facility];  Service: Interventional Radiology    MT LAP, RADICAL HYST W/ TUBE&OV, NODE BX N/A 12/19/2017    Procedure: HYSTERECTOMY LAPAROSCOPIC TOTAL ([address]) W/ ROBOTICS; BILATERAL SALPINGOOPHERECTOMY; LYMPH NODE DISSECTION; lysis of adhesions;  Surgeon: Bon Gibbs MD;  Location: [facility];  Service: Gynecology Oncology    MT LAP,DIAGNOSTIC ABDOMEN N/A 12/19/2017    Procedure: LAPAROSCOPY DIAGNOSTIC;  Surgeon: Bon Gibbs MD;  [facility];  Service: Gynecology Oncology    TONSILLECTOMY      US GUIDED BREAST BIOPSY RIGHT COMPLETE Right 6/28/2019       Meds/Allergies:    Prior to Admission medications    Medication Sig Start Date End Date Taking?  Authorizing Provider   acetaminophen (TYLENOL) 325 mg tablet Take 2 tablets (650 mg total) by mouth every 6 (six) hours as needed for mild pain, headaches or fever 10/8/19  Yes Percilla Files, DO   ARIPiprazole (ABILIFY) 10 mg tablet Take 10 mg by mouth daily   Yes Historical Provider, MD   aspirin (ECOTRIN LOW STRENGTH) 81 mg EC tablet Take 81 mg by mouth daily   Yes Historical Provider, MD   Calcium-Magnesium-Vitamin D (CALCIUM 500 PO) Take by mouth   Yes Historical Provider, MD   cholecalciferol (VITAMIN D3) 1,000 units tablet Take 1,000 Units by mouth daily   Yes Historical Provider, MD   Cyanocobalamin (VITAMIN B12 PO) Take by mouth   Yes Historical Provider, MD   enoxaparin (Lovenox) 120 mg/0 8 mL Inject 0 8 mL (120 mg total) under the skin daily 7/20/20  Yes RAFITA Nj   folic acid (FOLVITE) 1 mg tablet Take 1 tablet (1 mg total) by mouth daily 10/8/19  Yes Percilla Files, DO   gemfibrozil (LOPID) 600 mg tablet TAKE 1 TABLET BY MOUTH EVERY DAY 3/14/20  Yes Chel Vieyra DO   Multiple Vitamin (MULTIVITAMIN) tablet Take 1 tablet by mouth daily   Yes Historical Provider, MD   MYRBETRIQ 50 MG TB24 TAKE 1 TABLET BY MOUTH EVERY DAY 12/23/19  Yes Dianne Agustin MD   omeprazole (PriLOSEC) 20 mg delayed release capsule Take 20 mg by mouth daily   Yes Historical Provider, MD   oxybutynin (DITROPAN-XL) 10 MG 24 hr tablet Take 1 tablet (10 mg total) by mouth daily 6/30/20  Yes RAFITA Ling   quinapril (ACCUPRIL) 5 mg tablet TAKE 1 TABLET BY MOUTH EVERYDAY AT BEDTIME 7/27/20  Yes Tan Stephenson DO   saccharomyces boulardii (FLORASTOR) 250 mg capsule Take 1 capsule (250 mg total) by mouth 2 (two) times a day 10/8/19  Yes Simran Morillo DO   traMADol (ULTRAM) 50 mg tablet Take 1 tablet (50 mg total) by mouth every 6 (six) hours as needed for moderate pain 7/27/20  Yes Tan Stephenson DO   venlafaxine [facility]) 75 mg tablet Take 75 mg by mouth 3 (three) times a day     Yes Historical Provider, MD   cyclobenzaprine (FLEXERIL) 10 mg tablet Take 1 tablet (10 mg total) by mouth daily at bedtime  Patient not taking: Reported on 8/6/2020 6/30/20   Tan Stephenson DO   hydrOXYzine HCL (ATARAX) 25 mg tablet Take 1 tablet (25 mg total) by mouth every 6 (six) hours as needed for itching  Patient not taking: Reported on 8/6/2020 1/12/20 8/6/20  Guicho Mendez MD   ondansetron [facility]) 8 mg tablet Take 1 tablet (8 mg total) by mouth every 8 (eight) hours as needed for nausea or vomiting 7/17/19 8/6/20  RAFITA Covarrubias     I have reviewed home medications with patient personally  Allergies:    Allergies   Allergen Reactions    Cephalosporins Rash     Which Cephalosporin reaction was to not specified; however, has tolerated Amoxicillin, Cefazolin, and Cefepime       Social History:     Marital Status: Single   Occupation:   Patient Pre-hospital Living Situation: Home with   Patient Pre-hospital Level of Mobility: Walks with a cane independently  Patient Pre-hospital Diet Restrictions: Regular diet  Substance Use History:   Social History     Substance and Sexual Activity   Alcohol Use Not Currently     Social History     Tobacco Use   Smoking Status Never Smoker   Smokeless Tobacco Never Used Social History     Substance and Sexual Activity   Drug Use No       Family History:    Family History   Problem Relation Age of Onset    Other Mother         dyslipidemia    Ovarian cancer Mother 48    Lymphoma Father     Bone cancer Maternal Grandfather     No Known Problems Sister     No Known Problems Maternal Grandmother     Uterine cancer Paternal Grandmother     No Known Problems Paternal Grandfather     No Known Problems Maternal Aunt     No Known Problems Maternal Aunt     No Known Problems Maternal Aunt     No Known Problems Maternal Aunt     No Known Problems Paternal Aunt     No Known Problems Paternal Aunt     No Known Problems Paternal Aunt     No Known Problems Paternal Aunt     No Known Problems Brother     No Known Problems Brother        Physical Exam:     Vitals:   Blood Pressure: 101/55 (08/06/20 1614)  Pulse: 82 (08/06/20 1614)  Temperature: 98 6 °F (37 °C) (08/06/20 1614)  Temp Source: Oral (08/06/20 7931)  Respirations: 18 (08/06/20 1614)  Height: 4' 11" (149 9 cm) (08/06/20 1213)  Weight - Scale: 86 9 kg (191 lb 9 6 oz) (08/06/20 1213)  SpO2: 98 % (08/06/20 1614)    Physical Exam   Constitutional: She is oriented to person, place, and time  HENT:   Head: Normocephalic and atraumatic  Nose: Nose normal    Eyes: Pupils are equal, round, and reactive to light  Neck: Normal range of motion  Neck supple  Cardiovascular: Normal rate, regular rhythm and normal pulses  No murmur heard  Pulmonary/Chest: Effort normal    Abdominal: Soft  Normal appearance and bowel sounds are normal  She exhibits no distension  There is no rebound  Musculoskeletal: Normal range of motion  General: No swelling or tenderness (Right flank and lower back on palpation)  Neurological: She is alert and oriented to person, place, and time  Skin: Skin is warm and dry  Capillary refill takes less than 2 seconds     Psychiatric: Her behavior is normal  Mood, judgment and thought content normal          Additional Data:     Lab Results: I have personally reviewed pertinent reports  Results from last 7 days   Lab Units 08/06/20  0643   WBC Thousand/uL 5 12   HEMOGLOBIN g/dL 10 4*   HEMATOCRIT % 33 1*   PLATELETS Thousands/uL 251   NEUTROS PCT % 73   LYMPHS PCT % 13*   MONOS PCT % 11   EOS PCT % 0     Results from last 7 days   Lab Units 08/06/20  0643   POTASSIUM mmol/L 4 3   CHLORIDE mmol/L 106   CO2 mmol/L 26   BUN mg/dL 23   CREATININE mg/dL 1 00   CALCIUM mg/dL 9 7   ALK PHOS U/L 137*   ALT U/L 17   AST U/L 5           Imaging: I have personally reviewed pertinent reports  Mri Lumbar Spine Wo Contrast    Result Date: 7/15/2020  Narrative: MRI LUMBAR SPINE WITHOUT CONTRAST INDICATION: M54 5: Low back pain  Right-sided low back pain and on right side of pelvis and right foot numbness  Neuropathy  COMPARISON:  6/9/2020; 7/4/2020 TECHNIQUE:  Sagittal T1, sagittal T2, sagittal inversion recovery, axial T1 and axial T2, coronal T2   IMAGE QUALITY:  Diagnostic FINDINGS: VERTEBRAL BODIES:  There are 5 lumbar type vertebral bodies  Mild levoscoliosis mid lumbar spine  Normal lordosis  Scattered degenerative endplate changes  No focally suspicious marrow lesions  No bone marrow edema or compression abnormality  SACRUM:  Perineural cyst is noted dorsally to the right of midline at the S2 level, image 12, series 401, measuring approximately 2 cm  There is associated smooth bony remodeling of the right neural foramen at the S2-3 level  DISTAL CORD AND CONUS:  Normal size and signal within the distal cord and conus  Conus medullaris terminates at the L1-2 intervertebral disc space   PARASPINAL SOFT TISSUES:  Well-circumscribed T2 hyperintense lesion arising from the posterior medial aspect of the right kidney measuring 2 6 cm, likely a renal cyst   This finding correlates to the recent CT of the abdomen and pelvis from July 4  right-sided percutaneous nephrostomy tube also redemonstrated  No hydronephrosis  LOWER THORACIC DISC SPACES:  Normal disc height and signal   No disc herniation, canal stenosis or foraminal narrowing  LUMBAR DISC SPACES: L1-L2:  There is no focal disk herniation, central canal or neural foraminal narrowing  Bilateral facet hypertrophy noted  L2-L3:  There is a right neural foraminal disc herniation, protrusion type  There is bilateral facet hypertrophy  Mild right neural foraminal narrowing  Central canal and left neural foramen patent  L3-L4:  There is no focal disk herniation, central canal or neural foraminal narrowing  Bilateral facet hypertrophy noted  L4-L5:  There is a left neural foraminal disc herniation, protrusion type  Mild left neural foraminal narrowing  Central canal and right neural foramen patent  L5-S1:  There is a diffuse disk bulge  No significant central canal or neural foraminal narrowing  Bilateral facet hypertrophy noted  Impression: 1  Mild spondylosis  2   Prominent, 2 cm perineural cyst incidentally noted at the right S2-3 neural foramen with associated smooth bony remodeling of the right neural foramen at this level 3  Right renal system percutaneous nephrostomy tube redemonstrated, similar to the recent prior CT  Workstation performed: [id number]     Pe Study With Ct Abdomen And Pelvis With Contrast    Result Date: 8/6/2020  Narrative: CT PULMONARY ANGIOGRAM OF THE CHEST AND CT ABDOMEN AND PELVIS WITH INTRAVENOUS CONTRAST INDICATION:   Right-sided flank pain  History of right nephrostomy    COMPARISON:  7/4/2020 TECHNIQUE:  CT examination of the chest, abdomen and pelvis was performed  Thin section CT angiographic technique was used in the chest in order to evaluate for pulmonary embolus and coronal 3D MIP postprocessing was performed on the acquisition scanner  Axial, sagittal, and coronal 2D reformatted images were created from the source data and submitted for interpretation   Radiation dose length product (DLP) for this visit:  990 mGy-cm   This examination, like all CT scans performed in the [facility], was performed utilizing techniques to minimize radiation dose exposure, including the use of iterative reconstruction and automated exposure control  IV Contrast:  100 mL of iohexol (OMNIPAQUE) Enteric Contrast:  Enteric contrast was not administered  FINDINGS: CHEST PULMONARY ARTERIAL TREE:  Mild motion artifacts sensitivity in detection of small or peripheral pulmonary emboli with no evidence of more central embolic disease LUNGS:  Mild motion artifact reduces sensitivity for detection of small pulmonary nodules  There is no evidence of acute airspace consolidation  PLEURA:  Unremarkable  HEART/AORTA:  Small pericardial effusion  MEDIASTINUM AND KATIANA:  Unremarkable  CHEST WALL AND LOWER NECK:   Unremarkable  ABDOMEN LIVER/BILIARY TREE:  Unremarkable  GALLBLADDER:  Patient is status post cholecystectomy  Prominent intra and extrahepatic biliary tract noted, but unchanged from previous examination and frequently seen following cholecystectomy  SPLEEN:  Unremarkable  PANCREAS:  Unremarkable  ADRENAL GLANDS:  Unremarkable  KIDNEYS/URETERS:  Left kidney is unremarkable  Right kidney has a nephroureteral stent  Right renal cyst unchanged  Right kidney collecting system diameter unchanged from prior  STOMACH AND BOWEL:  Increased stool within the colon  No evidence of small bowel obstruction  APPENDIX:  No findings to suggest appendicitis  ABDOMINOPELVIC CAVITY:  No progressive adenopathy, no free fluid or free air  Soft tissue stranding in the inferior right retroperitoneum unchanged from prior VESSELS:  Unremarkable for patient's age  PELVIS REPRODUCTIVE ORGANS:  Uterus not seen URINARY BLADDER:  Contains the distal portion of the stent  Mild generalized bladder wall thickening ABDOMINAL WALL/INGUINAL REGIONS:  Unremarkable  OSSEOUS STRUCTURES:  No acute fracture or destructive osseous lesion  Impression: 1  Right-sided nephroureteral stent positioning appears unchanged from the previous study, without evidence of perinephric collection or change in collecting system caliber  Left kidney unremarkable 2  Increased stool in the colon  No bowel obstruction  3  Mild diffuse bladder wall thickening which may be related to inflammation but should be correlated with urinalysis to exclude infection 4  On this mildly motion limited study of the chest, no evidence of pulmonary embolism or acute infiltrate 5  Small pericardial effusion Workstation performed: [id number]       EKG, Pathology, and Other Studies Reviewed on Admission:   · EKG: None    Epic / Care Everywhere Records Reviewed: Yes     ** Please Note: This note has been constructed using a voice recognition system   **

## 2020-08-06 NOTE — ASSESSMENT & PLAN NOTE
· Hx of depression  · Denies being currently depressed or sad      Plan:  · Continue Effexor 75 mg po qd  · Continue Abilify 10 mg qd

## 2020-08-06 NOTE — ASSESSMENT & PLAN NOTE
· Hx of over active bladder and increased urinary frequency  · Oxybutynin and Mybetriq po qd  · CT Scan showed increased bladder wall thickening form UTI  Plan:  · Hold Oxybutynin and Mybetriq due to UTI and bladder wall thickening due to UTI

## 2020-08-06 NOTE — ED NOTES
Patient transported to room -434, receiving RN and PCA at bedside shortly after arrival to room     [facility]  08/06/20 9190

## 2020-08-06 NOTE — ASSESSMENT & PLAN NOTE
· Patient has had left lower back pain for a week  · Patient has been taking Tramadol and Tylenol for pain with minimum relief  · CT scan was negative for acute pathology  · Dilaudid given once in the ED for pain      Plan:  · Continue Tylenol for mild pain  · Tramadol for moderate pain  · Dialudid for breakthrough pain  · Robaxin 500 mg po q6h prn pain

## 2020-08-06 NOTE — PLAN OF CARE
Problem: Potential for Falls  Goal: Patient will remain free of falls  Description: INTERVENTIONS:  - Assess patient frequently for physical needs  -  Identify cognitive and physical deficits and behaviors that affect risk of falls    -  [place] fall precautions as indicated by assessment   - Educate patient/family on patient safety including physical limitations  - Instruct patient to call for assistance with activity based on assessment  - Modify environment to reduce risk of injury  - Consider OT/PT consult to assist with strengthening/mobility  Outcome: Progressing     Problem: GENITOURINARY - ADULT  Goal: Maintains or returns to baseline urinary function  Description: INTERVENTIONS:  - Assess urinary function  - Encourage oral fluids to ensure adequate hydration if ordered  - Administer IV fluids as ordered to ensure adequate hydration  - Administer ordered medications as needed  - Offer frequent toileting  - Follow urinary retention protocol if ordered  Outcome: Progressing     Problem: PAIN - ADULT  Goal: Verbalizes/displays adequate comfort level or baseline comfort level  Description: Interventions:  - Encourage patient to monitor pain and request assistance  - Assess pain using appropriate pain scale  - Administer analgesics based on type and severity of pain and evaluate response  - Implement non-pharmacological measures as appropriate and evaluate response  - Consider cultural and social influences on pain and pain management  - Notify physician/advanced practitioner if interventions unsuccessful or patient reports new pain  Outcome: Progressing     Problem: INFECTION - ADULT  Goal: Absence or prevention of progression during hospitalization  Description: INTERVENTIONS:  - Assess and monitor for signs and symptoms of infection  - Monitor lab/diagnostic results  - Monitor all insertion sites, i e  indwelling lines, tubes, and drains  - Monitor endotracheal if appropriate and nasal secretions for changes in amount and color  - [place] appropriate cooling/warming therapies per order  - Administer medications as ordered  - Instruct and encourage patient and family to use good hand hygiene technique  - Identify and instruct in appropriate isolation precautions for identified infection/condition  Outcome: Progressing     Problem: DISCHARGE PLANNING  Goal: Discharge to home or other facility with appropriate resources  Description: INTERVENTIONS:  - Identify barriers to discharge w/patient and caregiver  - Arrange for needed discharge resources and transportation as appropriate  - Identify discharge learning needs (meds, wound care, etc )  - Arrange for interpretive services to assist at discharge as needed  - Refer to Case Management Department for coordinating discharge planning if the patient needs post-hospital services based on physician/advanced practitioner order or complex needs related to functional status, cognitive ability, or social support system  Outcome: Progressing

## 2020-08-06 NOTE — ED PROVIDER NOTES
History  Chief Complaint   Patient presents with    Flank Pain     Pt reports L flank/side pain starting yesterday, denies fevers/NVD/difficulty urinating  Pt has R nephrostomy  Pt states pain is worse when ambulating      62-year-old female presents emergency department for left flank pain  Patient has a history of endometrial cancer and is status post chemotherapy  She is not currently receiving treatment for her disease  She does follow with gynecology  Patient states over the past 1 day patient developed left flank pain  Patient reports the pain feeling like a muscle pull  It is aggravated with deep inspiration  The pain is constant and moderately severe  It does not radiate  She has had no associated fevers or chills  She does notice increased pain with movement  Patient does take Lovenox for treatment of deep vein thrombosis  Patient has a right percutaneous nephrostomy tube and offers no complaints  The tube is draining clear pale yellow urine and is nontender  Patient denies chest pain  No hemoptysis  Patient is having no relief with Ultram       History provided by:  Patient, medical records and relative   used: No    Flank Pain   Pain location:  L flank  Pain quality: aching and dull    Pain radiates to:  Does not radiate  Pain severity:  Moderate  Onset quality:  Gradual  Duration:  1 day  Timing:  Constant  Progression:  Worsening  Chronicity:  New  Context: previous surgery    Context: not eating, not suspicious food intake and not trauma    Relieved by:  Nothing  Worsened by: Movement  Ineffective treatments:  None tried  Associated symptoms: no chills, no constipation, no cough, no diarrhea, no fever, no nausea, no shortness of breath and no vaginal bleeding    Risk factors: multiple surgeries        Prior to Admission Medications   Prescriptions Last Dose Informant Patient Reported? Taking?    ARIPiprazole (ABILIFY) 10 mg tablet 8/5/2020 at Unknown time Self Yes Yes   Sig: Take 10 mg by mouth daily   Calcium-Magnesium-Vitamin D (CALCIUM 500 PO) 8/5/2020 at Unknown time Self Yes Yes   Sig: Take by mouth   Cyanocobalamin (VITAMIN B12 PO) 8/5/2020 at Unknown time Self Yes Yes   Sig: Take by mouth   MYRBETRIQ 50 MG TB24 8/5/2020 at Unknown time Self No Yes   Sig: TAKE 1 TABLET BY MOUTH EVERY DAY   Multiple Vitamin (MULTIVITAMIN) tablet 8/5/2020 at Unknown time Self Yes Yes   Sig: Take 1 tablet by mouth daily   acetaminophen (TYLENOL) 325 mg tablet 8/5/2020 at Unknown time Self No Yes   Sig: Take 2 tablets (650 mg total) by mouth every 6 (six) hours as needed for mild pain, headaches or fever   aspirin (ECOTRIN LOW STRENGTH) 81 mg EC tablet 8/5/2020 at Unknown time Self Yes Yes   Sig: Take 81 mg by mouth daily   cholecalciferol (VITAMIN D3) 1,000 units tablet 8/5/2020 at Unknown time Self Yes Yes   Sig: Take 1,000 Units by mouth daily   cyclobenzaprine (FLEXERIL) 10 mg tablet Not Taking at Unknown time Self No No   Sig: Take 1 tablet (10 mg total) by mouth daily at bedtime   Patient not taking: Reported on 8/6/2020   enoxaparin (Lovenox) 120 mg/0 8 mL 8/5/2020 at Unknown time Self No Yes   Sig: Inject 0 8 mL (120 mg total) under the skin daily   folic acid (FOLVITE) 1 mg tablet 8/5/2020 at Unknown time Self No Yes   Sig: Take 1 tablet (1 mg total) by mouth daily   gemfibrozil (LOPID) 600 mg tablet 8/5/2020 at Unknown time Self No Yes   Sig: TAKE 1 TABLET BY MOUTH EVERY DAY   omeprazole (PriLOSEC) 20 mg delayed release capsule 8/5/2020 at Unknown time Self Yes Yes   Sig: Take 20 mg by mouth daily   oxybutynin (DITROPAN-XL) 10 MG 24 hr tablet 8/5/2020 at Unknown time Self No Yes   Sig: Take 1 tablet (10 mg total) by mouth daily   quinapril (ACCUPRIL) 5 mg tablet 8/5/2020 at Unknown time Self No Yes   Sig: TAKE 1 TABLET BY MOUTH EVERYDAY AT BEDTIME   saccharomyces boulardii (FLORASTOR) 250 mg capsule 8/5/2020 at Unknown time Self No Yes   Sig: Take 1 capsule (250 mg total) by mouth 2 (two) times a day   traMADol (ULTRAM) 50 mg tablet 8/5/2020 at Unknown time Self No Yes   Sig: Take 1 tablet (50 mg total) by mouth every 6 (six) hours as needed for moderate pain   venlafaxine (EFFEXOR) 75 mg tablet 8/5/2020 at Unknown time Self Yes Yes   Sig: Take 75 mg by mouth 3 (three) times a day        Facility-Administered Medications: None       Past Medical History:   Diagnosis Date    Anemia     Chemotherapy follow-up examination     Depression     Endometrial cancer ([facility] [address] ) 12/2017    Hyperglycemia     vx type 2 dm -- last assessed 4/1/14; resolved 11/7/17    Hyperlipidemia     Hypertension     Obesity     last assessed 10/14/17; resolved 11/7/17       Past Surgical History:   Procedure Laterality Date    ABDOMINAL SURGERY      GASTRIC BYPASS    CHOLECYSTECTOMY      at the time of gastric bypass    COLONOSCOPY      CT NEEDLE BIOPSY LYMPH NODE  7/8/2019    FL GUIDED CENTRAL VENOUS ACCESS DEVICE INSERTION  11/12/2019    GASTRIC BYPASS      HYSTERECTOMY Bilateral     total abdominal with salpingo-oophorectomy    IR PICC LINE  9/27/2019    IR PORT PLACEMENT  7/26/2019    IR PORT REMOVAL  9/20/2019    IR TUBE PLACEMENT NEPHROSTOMY  7/26/2019    OOPHORECTOMY Bilateral     [facility] INSJ TUNNELED CTR VAD W/SUBQ PORT AGE 5 YR/> Left 11/12/2019    Procedure: INSERTION VENOUS PORT ( PORT-A-CATH) IR;  Surgeon: Deanna Lu DO;  [facility];  Service: Interventional Radiology    [place] LAP, RADICAL HYST W/ TUBE&OV, NODE BX N/A 12/19/2017    Procedure: HYSTERECTOMY LAPAROSCOPIC TOTAL ([address]) W/ ROBOTICS; BILATERAL SALPINGOOPHERECTOMY; LYMPH NODE DISSECTION; lysis of adhesions;  Surgeon: Kaylen Dunn MD;  [facility];  Service: Gynecology Oncology    [place] LAP,DIAGNOSTIC ABDOMEN N/A 12/19/2017    Procedure: LAPAROSCOPY DIAGNOSTIC;  Surgeon: Kaylen Dunn MD;  [facility];  Service: Gynecology Oncology    TONSILLECTOMY      US GUIDED BREAST BIOPSY RIGHT COMPLETE Right 6/28/2019       Family History   Problem Relation Age of Onset    Other Mother         dyslipidemia    Ovarian cancer Mother 48    Lymphoma Father     Bone cancer Maternal Grandfather     No Known Problems Sister     No Known Problems Maternal Grandmother     Uterine cancer Paternal Grandmother     No Known Problems Paternal Grandfather     No Known Problems Maternal Aunt     No Known Problems Maternal Aunt     No Known Problems Maternal Aunt     No Known Problems Maternal Aunt     No Known Problems Paternal Aunt     No Known Problems Paternal Aunt     No Known Problems Paternal Aunt     No Known Problems Paternal Aunt     No Known Problems Brother     No Known Problems Brother      I have reviewed and agree with the history as documented  E-Cigarette/Vaping    E-Cigarette Use Never User      E-Cigarette/Vaping Substances    Nicotine No     THC No     CBD No     Flavoring No     Other No     Unknown No      Social History     Tobacco Use    Smoking status: Never Smoker    Smokeless tobacco: Never Used   Substance Use Topics    Alcohol use: Not Currently    Drug use: No       Review of Systems   Constitutional: Negative for chills and fever  Respiratory: Negative for cough and shortness of breath  Gastrointestinal: Negative for constipation, diarrhea and nausea  Genitourinary: Positive for flank pain  Negative for vaginal bleeding  Musculoskeletal: Positive for back pain and gait problem  All other systems reviewed and are negative  Physical Exam  Physical Exam  Vitals signs and nursing note reviewed  Constitutional:       General: She is in acute distress  Appearance: She is well-developed  She is obese  She is not ill-appearing, toxic-appearing or diaphoretic  HENT:      Head: Normocephalic  Nose: Nose normal       Mouth/Throat:      Pharynx: No oropharyngeal exudate     Eyes:      Conjunctiva/sclera: Conjunctivae normal       Pupils: Pupils are equal, round, and reactive to light  Neck:      Musculoskeletal: Normal range of motion and neck supple  Cardiovascular:      Rate and Rhythm: Normal rate and regular rhythm  Heart sounds: Normal heart sounds  No murmur  Pulmonary:      Effort: Pulmonary effort is normal       Breath sounds: Normal breath sounds  Abdominal:      General: Bowel sounds are normal  There is no distension  Palpations: Abdomen is soft  Tenderness: There is abdominal tenderness in the suprapubic area  There is left CVA tenderness  There is no right CVA tenderness, guarding or rebound  Comments: Perc nephrosotomy tube clean, dry, intact   Musculoskeletal: Normal range of motion  General: No tenderness or deformity  Lymphadenopathy:      Cervical: No cervical adenopathy  Skin:     General: Skin is warm and dry  Findings: No rash  Neurological:      Mental Status: She is alert and oriented to person, place, and time  Cranial Nerves: No cranial nerve deficit  Sensory: No sensory deficit  Motor: No abnormal muscle tone  Coordination: Coordination normal       Gait: Gait normal       Deep Tendon Reflexes: Reflexes are normal and symmetric  Psychiatric:         Behavior: Behavior normal          Thought Content:  Thought content normal          Judgment: Judgment normal          Vital Signs  ED Triage Vitals [08/06/20 0634]   Temperature Pulse Respirations Blood Pressure SpO2   98 °F (36 7 °C) 66 16 (!) 99/46 98 %      Temp Source Heart Rate Source Patient Position - Orthostatic VS BP Location FiO2 (%)   Oral Monitor Sitting Right arm --      Pain Score       8           Vitals:    08/07/20 2224 08/07/20 2224 08/07/20 2236 08/08/20 0808   BP: (!) 88/48 (!) 88/48 (!) 92/49 120/65   Pulse: 74 75 75 77   Patient Position - Orthostatic VS:             Visual Acuity      ED Medications  Medications   sodium chloride 0 9 % infusion (0 mL/hr Intravenous Stopped 8/8/20 1200)   HYDROmorphone (DILAUDID) injection 0 5 mg (0 5 mg Intravenous Given 8/6/20 0733)   iohexol (OMNIPAQUE) 350 MG/ML injection (MULTI-DOSE) 100 mL (100 mL Intravenous Given 8/6/20 0746)   HYDROmorphone (DILAUDID) injection 0 5 mg (0 5 mg Intravenous Given 8/6/20 0853)   levofloxacin (LEVAQUIN) IVPB (premix) 750 mg 150 mL (0 mg Intravenous Stopped 8/6/20 1452)   HYDROmorphone (DILAUDID) injection 0 5 mg (0 5 mg Intravenous Given 8/6/20 1155)   polyethylene glycol (MIRALAX) packet 17 g (17 g Oral Given 8/7/20 0900)   bisacodyl (DULCOLAX) EC tablet 5 mg (5 mg Oral Given 8/8/20 1026)       Diagnostic Studies  Results Reviewed     Procedure Component Value Units Date/Time    Urine culture [[id number]]  (Abnormal)  (Susceptibility) Collected:  08/06/20 0956    Lab Status:  Final result Specimen:  Urine, Clean Catch Updated:  08/08/20 1429     Urine Culture 60,000-69,000 cfu/ml Pseudomonas aeruginosa      30,000-39,000 cfu/ml Aerococcus urinae    Susceptibility     Pseudomonas aeruginosa (1)     Antibiotic Interpretation Microscan Method Status    ZID Performed  Yes  JAMES Final    Aztreonam ($$$)  Susceptible <=4 ug/ml JAMES Final    Cefepime ($) Susceptible <=2 00 ug/ml JAMES Final    Ceftazidime ($$) Susceptible 2 ug/ml JAMES Final    Ciprofloxacin ($)  Susceptible <=1 00 ug/ml JAMES Final    Gentamicin ($$) Susceptible 4 ug/ml JAMES Final    Imipenem Susceptible <=1 ug/ml JAMES Final    Levofloxacin ($) Susceptible 0 50 ug/ml JAMES Final    Meropenem ($$) Susceptible <=1 00 ug/ml JAMES Final    Piperacillin + Tazobactam ($$$) Susceptible <=4 ug/ml JAMES Final    Tobramycin ($) Susceptible <=1 ug/ml JAMES Final          Aerococcus urinae (2)     Antibiotic Interpretation Microscan Method Status    ZID Performed  Yes  JAMES Final                   Lipase [[id number]]  (Normal) Collected:  08/06/20 0643    Lab Status:  Final result Specimen:  Blood from Arm, Left Updated:  08/06/20 1040     Lipase 79 u/L     Urine Microscopic [[id number]]  (Abnormal) Collected: 08/06/20 0956    Lab Status:  Final result Specimen:  Urine, Clean Catch Updated:  08/06/20 1008     RBC, UA 2-4 /hpf      WBC, UA 30-50 /hpf      Epithelial Cells Occasional /hpf      Bacteria, UA Occasional /hpf      OTHER OBSERVATIONS WBCs Clumped    Urine Macroscopic, POC [[id number]]  (Abnormal) Collected:  08/06/20 0956    Lab Status:  Final result Specimen:  Urine Updated:  08/06/20 0940     Color, UA Yellow     Clarity, UA Cloudy     pH, UA 5 5     Leukocytes, UA Large     Nitrite, UA Negative     Protein, UA 30 (1+) mg/dl      Glucose, UA Negative mg/dl      Ketones, UA Negative mg/dl      Urobilinogen, UA 0 2 E U /dl      Bilirubin, UA Negative     Blood, UA Small     Specific Bangs, UA 1 015    Narrative:       CLINITEK RESULT    Comprehensive metabolic panel [[id number]]  (Abnormal) Collected:  08/06/20 0643    Lab Status:  Final result Specimen:  Blood from Arm, Left Updated:  08/06/20 0703     Sodium 140 mmol/L      Potassium 4 3 mmol/L      Chloride 106 mmol/L      CO2 26 mmol/L      ANION GAP 8 mmol/L      BUN 23 mg/dL      Creatinine 1 00 mg/dL      Glucose 98 mg/dL      Calcium 9 7 mg/dL      AST 5 U/L      ALT 17 U/L      Alkaline Phosphatase 137 U/L      Total Protein 7 9 g/dL      Albumin 3 8 g/dL      Total Bilirubin 0 24 mg/dL      eGFR 61 ml/min/1 73sq m     Narrative:       [place] guidelines for Chronic Kidney Disease (CKD):     Stage 1 with normal or high GFR (GFR > 90 mL/min/1 73 square meters)    Stage 2 Mild CKD (GFR = 60-89 mL/min/1 73 square meters)    Stage 3A Moderate CKD (GFR = 45-59 mL/min/1 73 square meters)    Stage 3B Moderate CKD (GFR = 30-44 mL/min/1 73 square meters)    Stage 4 Severe CKD (GFR = 15-29 mL/min/1 73 square meters)    Stage 5 End Stage CKD (GFR <15 mL/min/1 73 square meters)  Note: GFR calculation is accurate only with a steady state creatinine    CBC and differential [[id number]]  (Abnormal) Collected:  08/06/20 0643    Lab Status:  Final result Specimen:  Blood from Arm, Left Updated:  08/06/20 0650     WBC 5 12 Thousand/uL      RBC 3 45 Million/uL      Hemoglobin 10 4 g/dL      Hematocrit 33 1 %      MCV 96 fL      MCH 30 1 pg      MCHC 31 4 g/dL      RDW 13 2 %      MPV 9 4 fL      Platelets 229 Thousands/uL      nRBC 0 /100 WBCs      Neutrophils Relative 73 %      Immat GRANS % 2 %      Lymphocytes Relative 13 %      Monocytes Relative 11 %      Eosinophils Relative 0 %      Basophils Relative 1 %      Neutrophils Absolute 3 79 Thousands/µL      Immature Grans Absolute 0 08 Thousand/uL      Lymphocytes Absolute 0 66 Thousands/µL      Monocytes Absolute 0 54 Thousand/µL      Eosinophils Absolute 0 02 Thousand/µL      Basophils Absolute 0 03 Thousands/µL                  PE Study with CT Abdomen and Pelvis with contrast   Final Result by  (08/14 6845)   Addendum 1 of 1 by Anny Esparza MD (08/07 3564)   ADDENDUM:   With respect to the retroperitoneal soft tissue stranding along the right    upper lateral pelvic sidewall: ill-defined soft tissue density is noted    insinuated between the distal right ureteral stent and the bony sidewall    for example on image 3/64  An    approximation of size is 1 9 x 1 1 cm  PET/CT would be helpful to determine if there is viable tumor associated    with this density  This was discussed with Monica Gerber on 8/7/2020, 2:30 PM         Final                 Procedures  Procedures         ED Course       US AUDIT      Most Recent Value   Initial Alcohol Screen: US AUDIT-C    1  How often do you have a drink containing alcohol?  0 Filed at: 08/06/2020 0633   2  How many drinks containing alcohol do you have on a typical day you are drinking? 0 Filed at: 08/06/2020 1475   3a  Male UNDER 65: How often do you have five or more drinks on one occasion? 0 Filed at: 08/06/2020 0633   3b  FEMALE Any Age, or MALE 65+: How often do you have 4 or more drinks on one occassion?   0 Filed at: 08/06/2020 9456   Audit-C Score  0 Filed at: 08/06/2020 0371                  VALERIE/DAST-10      Most Recent Value   How many times in the past year have you    Used an illegal drug or used a prescription medication for non-medical reasons? Never Filed at: 08/06/2020 7207          PERC Rule for PE      Most Recent Value   PERC Rule for PE   Age >=50  1 Filed at: 08/06/2020 0724   HR >=100  0 Filed at: 08/06/2020 0724   O2 Sat on room air < 95%  0 Filed at: 08/06/2020 0724   History of PE or DVT  1 Filed at: 08/06/2020 0003   Recent trauma or surgery  0 Filed at: 08/06/2020 0724   Hemoptysis  0 Filed at: 08/06/2020 0724   Exogenous estrogen  0 Filed at: 08/06/2020 0724   Unilateral leg swelling  0 Filed at: 08/06/2020 0724   PERC Rule for PE Results  2 Filed at: 08/06/2020 9929          Initial Sepsis Screening     Row Name 08/06/20 7725                Is the patient's history suggestive of a new or worsening infection? No  -JM        Suspected source of infection  --        Are two or more of the following signs & symptoms of infection both present and new to the patient?  --        Indicate SIRS criteria  --        If the answer is yes to both questions, suspicion of sepsis is present  --        If severe sepsis is present AND tissue hypoperfusion perists in the hour after fluid resuscitation or lactate > 4, the patient meets criteria for SEPTIC SHOCK  --        Are any of the following organ dysfunction criteria present within 6 hours of suspected infection and SIRS criteria that are NOT considered to be chronic conditions?   --        Organ dysfunction  --        Date of presentation of severe sepsis  --        Time of presentation of severe sepsis  --        Tissue hypoperfusion persists in the hour after crystalloid fluid administration, evidenced, by either:  --        Was hypotension present within one hour of the conclusion of crystalloid fluid administration?  --        Date of presentation of septic shock  -- Time of presentation of septic shock  --          User Key  (r) = Recorded By, (t) = Taken By, (c) = Cosigned By    [address] Name Provider Type    Donna Michelle MD Resident            Raf King' Criteria for PE      Most Recent Value   Wells' Criteria for PE   Clinical signs and symptoms of DVT  0 Filed at: 08/06/2020 7827   PE is primary diagnosis or equally likely  3 Filed at: 08/06/2020 0724   HR >100  0 Filed at: 08/06/2020 0724   Immobilization at least 3 days or Surgery in the previous 4 weeks  0 Filed at: 08/06/2020 8059   Previous, objectively diagnosed PE or DVT  1 5 Filed at: 08/06/2020 0724   Hemoptysis  0 Filed at: 08/06/2020 2042   Malignancy with treatment within 6 months or palliative  1 Filed at: 08/06/2020 3928   Wells' Criteria Total  5 5 Filed at: 08/06/2020 8310                  MDM  Number of Diagnoses or Management Options  Left flank pain: new and requires workup  UTI (urinary tract infection): new and requires workup     Amount and/or Complexity of Data Reviewed  Clinical lab tests: ordered and reviewed  Tests in the radiology section of CPT®: ordered and reviewed  Decide to obtain previous medical records or to obtain history from someone other than the patient: yes  Obtain history from someone other than the patient: yes  Discuss the patient with other providers: yes  Independent visualization of images, tracings, or specimens: yes    Patient Progress  Patient progress: stable        Disposition  Final diagnoses:   Left flank pain   UTI (urinary tract infection)     Time reflects when diagnosis was documented in both MDM as applicable and the Disposition within this note     Time User Action Codes Description Comment    8/6/2020 11:58 AM Magi Chang Add [R10 9] Left flank pain     8/6/2020 11:58 AM Magi Chang Add [N39 0] UTI (urinary tract infection)       ED Disposition     ED Disposition Condition Date/Time Comment    Admit Stable Thu Aug 6, 2020 11:58 AM Case was discussed with   Lisha and the patient's admission status was agreed to be Admission Status: inpatient status to the service of Dr Osiel Melara   Follow-up Information     Follow up With Specialties Details Why Contact Yuliet Trevizo,  Internal Medicine, Family Medicine Schedule an appointment as soon as possible for a visit in 1 week(s)  [address]   [address] [address]  [phone]            Discharge Medication List as of 8/8/2020 12:07 PM      START taking these medications    Details   levofloxacin (LEVAQUIN) 750 mg tablet Take 1 tablet (750 mg total) by mouth every 24 hours for 7 days, Starting Sun 8/9/2020, Until Sun 8/16/2020, Normal      oxyCODONE (ROXICODONE) 5 mg immediate release tablet Take 1 tablet (5 mg total) by mouth every 4 (four) hours as needed for moderate painMax Daily Amount: 30 mg, Starting Sat 8/8/2020, Normal      methocarbamol (ROBAXIN) 500 mg tablet Take 1 tablet (500 mg total) by mouth every 8 (eight) hours for 3 days, Starting Sat 8/8/2020, Until Tue 8/11/2020, Normal         CONTINUE these medications which have NOT CHANGED    Details   acetaminophen (TYLENOL) 325 mg tablet Take 2 tablets (650 mg total) by mouth every 6 (six) hours as needed for mild pain, headaches or fever, Starting Tue 10/8/2019, Print      ARIPiprazole (ABILIFY) 10 mg tablet Take 10 mg by mouth daily, Historical Med      aspirin (ECOTRIN LOW STRENGTH) 81 mg EC tablet Take 81 mg by mouth daily, Historical Med      Calcium-Magnesium-Vitamin D (CALCIUM 500 PO) Take by mouth, Historical Med      cholecalciferol (VITAMIN D3) 1,000 units tablet Take 1,000 Units by mouth daily, Historical Med      Cyanocobalamin (VITAMIN B12 PO) Take by mouth, Historical Med      enoxaparin (Lovenox) 120 mg/0 8 mL Inject 0 8 mL (120 mg total) under the skin daily, Starting Mon 0/57/7851, Normal      folic acid (FOLVITE) 1 mg tablet Take 1 tablet (1 mg total) by mouth daily, Starting Tue 10/8/2019, Print gemfibrozil (LOPID) 600 mg tablet TAKE 1 TABLET BY MOUTH EVERY DAY, Normal      Multiple Vitamin (MULTIVITAMIN) tablet Take 1 tablet by mouth daily, Historical Med      omeprazole (PriLOSEC) 20 mg delayed release capsule Take 20 mg by mouth daily, Historical Med      quinapril (ACCUPRIL) 5 mg tablet TAKE 1 TABLET BY MOUTH EVERYDAY AT BEDTIME, Normal      saccharomyces boulardii (FLORASTOR) 250 mg capsule Take 1 capsule (250 mg total) by mouth 2 (two) times a day, Starting Tue 10/8/2019, Print      traMADol (ULTRAM) 50 mg tablet Take 1 tablet (50 mg total) by mouth every 6 (six) hours as needed for moderate pain, Starting Mon 7/27/2020, Normal      venlafaxine (EFFEXOR) 75 mg tablet Take 75 mg by mouth 3 (three) times a day  , Historical Med         STOP taking these medications       cyclobenzaprine (FLEXERIL) 10 mg tablet Comments:   Reason for Stopping:         MYRBETRIQ 50 MG TB24 Comments:   Reason for Stopping:         oxybutynin (DITROPAN-XL) 10 MG 24 hr tablet Comments:   Reason for Stopping:             No discharge procedures on file      PDMP Review       Value Time User    PDMP Reviewed  Yes 6/30/2020  4:00 PM David Lowery DO          ED Provider  Electronically Signed by           Sukhdeep Dailey DO  08/14/20 0104

## 2020-08-07 LAB
ANION GAP SERPL CALCULATED.3IONS-SCNC: 11 MMOL/L (ref 4–13)
BASOPHILS # BLD AUTO: 0.03 THOUSANDS/ΜL (ref 0–0.1)
BASOPHILS NFR BLD AUTO: 0 % (ref 0–1)
BUN SERPL-MCNC: 26 MG/DL (ref 5–25)
CALCIUM SERPL-MCNC: 9.8 MG/DL (ref 8.3–10.1)
CHLORIDE SERPL-SCNC: 103 MMOL/L (ref 100–108)
CO2 SERPL-SCNC: 24 MMOL/L (ref 21–32)
CREAT SERPL-MCNC: 1.08 MG/DL (ref 0.6–1.3)
EOSINOPHIL # BLD AUTO: 0.01 THOUSAND/ΜL (ref 0–0.61)
EOSINOPHIL NFR BLD AUTO: 0 % (ref 0–6)
ERYTHROCYTE [DISTWIDTH] IN BLOOD BY AUTOMATED COUNT: 13.3 % (ref 11.6–15.1)
GFR SERPL CREATININE-BSD FRML MDRD: 55 ML/MIN/1.73SQ M
GLUCOSE SERPL-MCNC: 107 MG/DL (ref 65–140)
HCT VFR BLD AUTO: 33.1 % (ref 34.8–46.1)
HGB BLD-MCNC: 10.4 G/DL (ref 11.5–15.4)
IMM GRANULOCYTES # BLD AUTO: 0.1 THOUSAND/UL (ref 0–0.2)
IMM GRANULOCYTES NFR BLD AUTO: 1 % (ref 0–2)
LYMPHOCYTES # BLD AUTO: 0.63 THOUSANDS/ΜL (ref 0.6–4.47)
LYMPHOCYTES NFR BLD AUTO: 8 % (ref 14–44)
MCH RBC QN AUTO: 30.1 PG (ref 26.8–34.3)
MCHC RBC AUTO-ENTMCNC: 31.4 G/DL (ref 31.4–37.4)
MCV RBC AUTO: 96 FL (ref 82–98)
MONOCYTES # BLD AUTO: 0.8 THOUSAND/ΜL (ref 0.17–1.22)
MONOCYTES NFR BLD AUTO: 10 % (ref 4–12)
NEUTROPHILS # BLD AUTO: 6.09 THOUSANDS/ΜL (ref 1.85–7.62)
NEUTS SEG NFR BLD AUTO: 81 % (ref 43–75)
NRBC BLD AUTO-RTO: 0 /100 WBCS
PLATELET # BLD AUTO: 266 THOUSANDS/UL (ref 149–390)
PMV BLD AUTO: 9.7 FL (ref 8.9–12.7)
POTASSIUM SERPL-SCNC: 4.4 MMOL/L (ref 3.5–5.3)
RBC # BLD AUTO: 3.45 MILLION/UL (ref 3.81–5.12)
SODIUM SERPL-SCNC: 138 MMOL/L (ref 136–145)
WBC # BLD AUTO: 7.66 THOUSAND/UL (ref 4.31–10.16)

## 2020-08-07 PROCEDURE — 99232 SBSQ HOSP IP/OBS MODERATE 35: CPT | Performed by: INTERNAL MEDICINE

## 2020-08-07 PROCEDURE — 80048 BASIC METABOLIC PNL TOTAL CA: CPT | Performed by: PHYSICIAN ASSISTANT

## 2020-08-07 PROCEDURE — 85025 COMPLETE CBC W/AUTO DIFF WBC: CPT | Performed by: PHYSICIAN ASSISTANT

## 2020-08-07 RX ORDER — LEVOFLOXACIN 750 MG/1
750 TABLET ORAL EVERY 24 HOURS
Status: DISCONTINUED | OUTPATIENT
Start: 2020-08-07 | End: 2020-08-08 | Stop reason: HOSPADM

## 2020-08-07 RX ORDER — SODIUM CHLORIDE 9 MG/ML
100 INJECTION, SOLUTION INTRAVENOUS CONTINUOUS
Status: DISPENSED | OUTPATIENT
Start: 2020-08-07 | End: 2020-08-08

## 2020-08-07 RX ORDER — POLYETHYLENE GLYCOL 3350 17 G/17G
17 POWDER, FOR SOLUTION ORAL ONCE
Status: COMPLETED | OUTPATIENT
Start: 2020-08-07 | End: 2020-08-07

## 2020-08-07 RX ADMIN — SODIUM CHLORIDE 100 ML/HR: 0.9 INJECTION, SOLUTION INTRAVENOUS at 21:49

## 2020-08-07 RX ADMIN — ASPIRIN 81 MG: 81 TABLET, COATED ORAL at 08:36

## 2020-08-07 RX ADMIN — HYDROMORPHONE HYDROCHLORIDE 0.5 MG: 1 INJECTION, SOLUTION INTRAMUSCULAR; INTRAVENOUS; SUBCUTANEOUS at 08:48

## 2020-08-07 RX ADMIN — VENLAFAXINE 75 MG: 37.5 TABLET ORAL at 17:26

## 2020-08-07 RX ADMIN — LEVOFLOXACIN 750 MG: 750 TABLET, FILM COATED ORAL at 11:18

## 2020-08-07 RX ADMIN — SODIUM CHLORIDE 100 ML/HR: 0.9 INJECTION, SOLUTION INTRAVENOUS at 12:35

## 2020-08-07 RX ADMIN — LISINOPRIL 5 MG: 5 TABLET ORAL at 08:36

## 2020-08-07 RX ADMIN — TRAMADOL HYDROCHLORIDE 50 MG: 50 TABLET, FILM COATED ORAL at 06:09

## 2020-08-07 RX ADMIN — PANTOPRAZOLE SODIUM 40 MG: 40 TABLET, DELAYED RELEASE ORAL at 05:56

## 2020-08-07 RX ADMIN — TRAMADOL HYDROCHLORIDE 50 MG: 50 TABLET, FILM COATED ORAL at 12:27

## 2020-08-07 RX ADMIN — OXYCODONE HYDROCHLORIDE 5 MG: 5 TABLET ORAL at 15:40

## 2020-08-07 RX ADMIN — ARIPIPRAZOLE 10 MG: 5 TABLET ORAL at 08:36

## 2020-08-07 RX ADMIN — Medication 1000 UNITS: at 08:36

## 2020-08-07 RX ADMIN — FOLIC ACID 1 MG: 1 TABLET ORAL at 08:36

## 2020-08-07 RX ADMIN — GEMFIBROZIL 600 MG: 600 TABLET ORAL at 08:36

## 2020-08-07 RX ADMIN — ENOXAPARIN SODIUM 120 MG: 120 INJECTION SUBCUTANEOUS at 08:36

## 2020-08-07 RX ADMIN — VENLAFAXINE 75 MG: 37.5 TABLET ORAL at 08:48

## 2020-08-07 RX ADMIN — HYDROMORPHONE HYDROCHLORIDE 0.5 MG: 1 INJECTION, SOLUTION INTRAMUSCULAR; INTRAVENOUS; SUBCUTANEOUS at 20:02

## 2020-08-07 RX ADMIN — POLYETHYLENE GLYCOL 3350 17 G: 17 POWDER, FOR SOLUTION ORAL at 09:00

## 2020-08-07 RX ADMIN — VENLAFAXINE 75 MG: 37.5 TABLET ORAL at 20:10

## 2020-08-07 NOTE — PROGRESS NOTES
Progress Note - Nicky Lovettperla 1955, 60 y o  female MRN: [id number]    Unit/Bed#: W -01 Encounter: [id number]    Primary Care Provider: Brigid Cleaning DO   Date and time admitted to hospital: 8/6/2020  6:31 AM        Acute left-sided low back pain without sciatica  Assessment & Plan  · Patient has had left lower back pain for a week  · Patient has been taking Tramadol and Tylenol for pain with minimum relief  · CT scan was negative for acute pathology  · Dilaudid given once in the ED for pain  Plan:  · Continue Tylenol for mild pain  · Tramadol for moderate pain  · Dialudid for breakthrough pain  · Robaxin 500 mg po q6h prn pain     Overactive bladder  Assessment & Plan  · Hx of over active bladder and increased urinary frequency  · Oxybutynin and Mybetriq po qd  · CT Scan showed increased bladder wall thickening form UTI  Plan:  · Hold Oxybutynin and Mybetriq due to UTI and bladder wall thickening due to UTI  Acute deep vein thrombosis (DVT) of proximal vein of lower extremity (HCC)  Assessment & Plan  · Patient has hx of DVT  · On Lovenox 120 mg    Plan:  · Continue Lovenox 120 mg SC    Endometrial cancer ([facility] [address] )  Assessment & Plan  · Patient has hx of endometrial cancer with right ureter involvement  · Has percutaneous nephrostomy tube on the right  · Last follow up with GYN ONC was 08/05/2020  · Not on chemotherapy currently  · Stable    Plan:  · Stable patient to follow up outpatient with GYN ONC prn  Major depression, chronic  Assessment & Plan  · Hx of depression  · Denies being currently depressed or sad  Plan:  · Continue Effexor 75 mg po qd  · Continue Abilify 10 mg qd    Benign essential hypertension  Assessment & Plan  · Patient has hx of HTN that is well controlled on Quinipril 5 mg qd      Plan:  · Lisinopril 5 mg qd for HTN  · Monitor BP     * UTI (urinary tract infection)  Assessment & Plan  · Patient has left flank pain for 1 day in the setting of lower back pain for 1 week  · Afebrile but has been taking tylenol for a week for back pain  · Has percutaneous nephrostomy on right secondary to endometrial cancer spread  Admits to less output from tube  · UA positive for WBCs, bacteria and blood  Negative nitrites  · Urine culture in 2020 grew Klebsiella that was resistant to PCN and Cephalosporins  · CT scan remarkable for increased bladder wall thickening indicative for UTI  · Received Levaquin 750 mg once in ED    Plan:  · Continue Levaquin 750 mg po qd x 5 days  · Hold oxybutynin and Mybetriq  · Urine culture showed Oxidase Positive gram negative laly, will wait for final results        VTE Pharmacologic Prophylaxis:   Pharmacologic: Enoxaparin (Lovenox)  Mechanical VTE Prophylaxis in Place: Yes    Discussions with Specialists or Other Care Team Provider: None    Education and Discussions with Family / Patient:  Discussed the patient's care plan with patient  Current Length of Stay: 1 day(s)    Current Patient Status: Inpatient     Discharge Plan / Estimated Discharge Date:  Continue inpatient treatment for now, may be discharged tomorrow possibly  Code Status: Level 1 - Full Code      Subjective:   Patient seen and examined  No acute events overnight  Patient was sitting up having breakfast   Patient denies any fever nausea vomiting diarrhea constipation chest pain palpitations or shortness of breath  Objective:     Vitals:   Temp (24hrs), Av 3 °F (36 8 °C), Min:97 8 °F (36 6 °C), Max:98 6 °F (37 °C)    Temp:  [97 8 °F (36 6 °C)-98 6 °F (37 °C)] 97 8 °F (36 6 °C)  HR:  [61-82] 75  Resp:  [16-18] 16  BP: (101-151)/(55-89) 112/68  SpO2:  [95 %-99 %] 96 %  Body mass index is 38 7 kg/m²  Input and Output Summary (last 24 hours):        Intake/Output Summary (Last 24 hours) at 2020 1204  Last data filed at 2020 0557  Gross per 24 hour   Intake 960 ml   Output --   Net 960 ml       Physical Exam:     Physical Exam  Constitutional: General: She is not in acute distress  Appearance: She is well-developed  She is not diaphoretic  HENT:      Head: Normocephalic and atraumatic  Nose: Nose normal    Eyes:      Conjunctiva/sclera: Conjunctivae normal       Pupils: Pupils are equal, round, and reactive to light  Cardiovascular:      Rate and Rhythm: Normal rate and regular rhythm  Heart sounds: No murmur  No friction rub  No gallop  Pulmonary:      Effort: Pulmonary effort is normal  No respiratory distress  Breath sounds: Normal breath sounds  Abdominal:      Palpations: Abdomen is soft  Musculoskeletal:         General: Tenderness (L flank) present  Skin:     General: Skin is warm and dry  Neurological:      Mental Status: She is alert and oriented to person, place, and time  Psychiatric:         Behavior: Behavior normal          Thought Content: Thought content normal          Judgment: Judgment normal        Additional Data:     Labs: I have personally reviewed pertinent reports  Results from last 7 days   Lab Units 08/07/20  0604 08/06/20  0643   WBC Thousand/uL 7 66 5 12   HEMOGLOBIN g/dL 10 4* 10 4*   HEMATOCRIT % 33 1* 33 1*   PLATELETS Thousands/uL 266 251   NEUTROS PCT % 81* 73   MONOS PCT % 10 11      Results from last 7 days   Lab Units 08/07/20  0604 08/06/20  0643   POTASSIUM mmol/L 4 4 4 3   CHLORIDE mmol/L 103 106   CO2 mmol/L 24 26   BUN mg/dL 26* 23   CREATININE mg/dL 1 08 1 00   CALCIUM mg/dL 9 8 9 7   ALK PHOS U/L  --  137*   ALT U/L  --  17   AST U/L  --  5                            * Additional Pertinent [facility] Tests [facility]ed[facility]    Radiology Results: I have personally reviewed pertinent reports  Pe Study With Ct Abdomen And Pelvis With Contrast    Result Date: 8/6/2020  Impression: 1   Right-sided nephroureteral stent positioning appears unchanged from the previous study, without evidence of perinephric collection or change in collecting system caliber  Left kidney unremarkable 2  Increased stool in the colon  No bowel obstruction  3  Mild diffuse bladder wall thickening which may be related to inflammation but should be correlated with urinalysis to exclude infection 4  On this mildly motion limited study of the chest, no evidence of pulmonary embolism or acute infiltrate 5  Small pericardial effusion Workstation performed: [id number]       Recent Cultures (last 7 days):     Results from last 7 days   Lab Units 08/06/20  0956   URINE CULTURE  60,000-69,000 cfu/ml Oxidase Positive gram negative laly*       Last 24 Hours Medication List:   acetaminophen, 975 mg, Oral, Q8H PRN, Iona Saldaña MD  ARIPiprazole, 10 mg, Oral, Daily, Iona Saldaña MD  aspirin, 81 mg, Oral, Daily, Iona Saldaña MD  cholecalciferol, 1,000 Units, Oral, Daily, Iona Saldaña MD  docusate sodium, 100 mg, Oral, BID PRN, Iona Saldaña MD  enoxaparin, 120 mg, Subcutaneous, Daily, Iona Saldaña MD  folic acid, 1 mg, Oral, Daily, Iona Saldaña MD  gemfibrozil, 600 mg, Oral, Daily, Iona Saldaña MD  HYDROmorphone, 0 5 mg, Intravenous, Q4H PRN, Iona Saldaña MD  levofloxacin, 750 mg, Oral, Q24H, Iona Saldaña MD  lisinopril, 5 mg, Oral, Daily, Iona Saldaña MD  methocarbamol, 500 mg, Oral, Q6H PRN, Iona Saldaña MD  ondansetron, 4 mg, Oral, Q6H PRN, Iona Saldaña MD  oxyCODONE, 5 mg, Oral, Q6H PRN, Iona Saldaña MD  pantoprazole, 40 mg, Oral, Early Morning, Iona Saldaña MD  senna, 1 tablet, Oral, HS PRN, Iona Saldaña MD  traMADol, 50 mg, Oral, Q6H PRN, Iona Saldaña MD  venlafaxine, 75 mg, Oral, TID, Iona Saldaña MD         Today, Patient Was Seen By: Mauricio Mcneil, [address] [place] [address]  Internal Medicine Residency PGY-1    Portions of the record may have been created with voice recognition software    Occasional wrong word or "sound a like" substitutions may have occurred due to the inherent limitations of voice recognition software  Read the chart carefully and recognize, using context, where substitutions have occurred

## 2020-08-07 NOTE — PLAN OF CARE
Problem: Potential for Falls  Goal: Patient will remain free of falls  Description: INTERVENTIONS:  - Assess patient frequently for physical needs  -  Identify cognitive and physical deficits and behaviors that affect risk of falls    -  [place] fall precautions as indicated by assessment   - Educate patient/family on patient safety including physical limitations  - Instruct patient to call for assistance with activity based on assessment  - Modify environment to reduce risk of injury  - Consider OT/PT consult to assist with strengthening/mobility  Outcome: Progressing     Problem: GENITOURINARY - ADULT  Goal: Maintains or returns to baseline urinary function  Description: INTERVENTIONS:  - Assess urinary function  - Encourage oral fluids to ensure adequate hydration if ordered  - Administer IV fluids as ordered to ensure adequate hydration  - Administer ordered medications as needed  - Offer frequent toileting  - Follow urinary retention protocol if ordered  Outcome: Progressing     Problem: PAIN - ADULT  Goal: Verbalizes/displays adequate comfort level or baseline comfort level  Description: Interventions:  - Encourage patient to monitor pain and request assistance  - Assess pain using appropriate pain scale  - Administer analgesics based on type and severity of pain and evaluate response  - Implement non-pharmacological measures as appropriate and evaluate response  - Consider cultural and social influences on pain and pain management  - Notify physician/advanced practitioner if interventions unsuccessful or patient reports new pain  Outcome: Progressing     Problem: INFECTION - ADULT  Goal: Absence or prevention of progression during hospitalization  Description: INTERVENTIONS:  - Assess and monitor for signs and symptoms of infection  - Monitor lab/diagnostic results  - Monitor all insertion sites, i e  indwelling lines, tubes, and drains  - Monitor endotracheal if appropriate and nasal secretions for changes in amount and color  - [place] appropriate cooling/warming therapies per order  - Administer medications as ordered  - Instruct and encourage patient and family to use good hand hygiene technique  - Identify and instruct in appropriate isolation precautions for identified infection/condition  Outcome: Progressing     Problem: DISCHARGE PLANNING  Goal: Discharge to home or other facility with appropriate resources  Description: INTERVENTIONS:  - Identify barriers to discharge w/patient and caregiver  - Arrange for needed discharge resources and transportation as appropriate  - Identify discharge learning needs (meds, wound care, etc )  - Arrange for interpretive services to assist at discharge as needed  - Refer to Case Management Department for coordinating discharge planning if the patient needs post-hospital services based on physician/advanced practitioner order or complex needs related to functional status, cognitive ability, or social support system  Outcome: Progressing

## 2020-08-07 NOTE — PROGRESS NOTES
The levofloxacin has / have been converted to Oral per [facility] IV-to-PO Auto-Conversion Protocol for Adults as approved by the Pharmacy and Therapeutics Committee  The patient met all eligible criteria:  3 Age = 25years old   2) Received at least one dose of the IV form   3) Receiving at least one other scheduled oral/enteral medication   4) Tolerating an oral/enteral diet   and did not have any exclusions:   1) Critical care patient   2) Active GI bleed (IF assessing H2RAs or PPIs)   3) Continuous tube feeding (IF assessing cipro, doxycycline, levofloxacin, minocycline, rifampin, or voriconazole)   4) Receiving PO vancomycin (IF assessing metronidazole)   5) Persistent nausea and/or vomiting   6) Ileus or gastrointestinal obstruction   7) Sabino/nasogastric tube set for continuous suction   8) Specific order not to automatically convert to PO (in the order's comments or if discussed in the most recent Infectious Disease or primary team's progress notes)

## 2020-08-07 NOTE — ASSESSMENT & PLAN NOTE
· Patient has left flank pain for 1 day in the setting of lower back pain for 1 week  · Afebrile but has been taking tylenol for a week for back pain  · Has percutaneous nephrostomy on right secondary to endometrial cancer spread  Admits to less output from tube  · UA positive for WBCs, bacteria and blood  Negative nitrites  · Urine culture in 01/2020 grew Klebsiella that was resistant to PCN and Cephalosporins  · CT scan remarkable for increased bladder wall thickening indicative for UTI  · Received Levaquin 750 mg once in ED    Plan:  · Continue Levaquin 750 mg po qd x 5 days    · Hold oxybutynin and Mybetriq  · Urine culture showed Oxidase Positive gram negative laly, will wait for final results

## 2020-08-07 NOTE — UTILIZATION REVIEW
Initial Clinical Review    Admission: Date/Time/Statement:   Admission Orders (From admission, onward)     Ordered        08/06/20 1215  Inpatient Admission  Once                   Orders Placed This Encounter   Procedures    Inpatient Admission     Standing Status:   Standing     Number of Occurrences:   1     Order Specific Question:   Admitting Physician     Answer:   Radha Pill     Order Specific Question:   Level of Care     Answer:   Med Surg [16]     Order Specific Question:   Estimated length of stay     Answer:   More than 2 Midnights     Order Specific Question:   Certification     Answer:   I certify that inpatient services are medically necessary for this patient for a duration of greater than two midnights  See H&P and MD Progress Notes for additional information about the patient's course of treatment  ED Arrival Information     Expected Arrival Acuity Means of Arrival Escorted By Service Admission Type    - 8/6/2020 06:31 Urgent Ambulance [facility] General Medicine Urgent    Arrival Complaint    Pain left side        Chief Complaint   Patient presents with    Flank Pain     Pt reports L flank/side pain starting yesterday, denies fevers/NVD/difficulty urinating  Pt has R nephrostomy  Pt states pain is worse when ambulating      Assessment/Plan:   64y Female to ED presents with moderate to severe intermittent pain on left flank area that started yesterday, back pain x1 month  Also c/o urinary frequency, nephrostomy tube in place to the right  PMH for Endometrial cancer not on chemotherapy, DVT, Over Active bladder, Depression , HTN  In ED CT scan was positive for thickened urinary bladder indicative of possible UTI  UA positive foe numerous WBCs, bacteria and small amount of blood  Admit Inpatient level of care for UTI, Acute DVT (Hx of DVT), Overactive bladder, Acute left-sided low back pain  percutaneous nephrostomy on right secondary to endometrial cancer spread   Admits to less output from tube  Urine culture in 01/2020 grew Klebsiella that was resistant to PCN and Cephalosporins  Po Antibiotics x1 given in ED and continue for 5 days  Hold oxybutynin and Mybetriq  Urine culture pending  Continue Lovenox 120 mg  Pain control  8/7 Progress notes; Pain control  Continue to hold Oxybutynin and Mybetriq due to UTI and bladder wall thickening due to UTI  Continue antibiotics  Urine culture showed Oxidase Positive gram negative laly, will wait for final results     ED Triage Vitals [08/06/20 0634]   Temperature Pulse Respirations Blood Pressure SpO2   98 °F (36 7 °C) 66 16 (!) 99/46 98 %      Temp Source Heart Rate Source Patient Position - Orthostatic VS BP Location FiO2 (%)   Oral Monitor Sitting Right arm --      Pain Score       8          Wt Readings from Last 1 Encounters:   08/06/20 86 9 kg (191 lb 9 6 oz)     Additional Vital Signs:   08/07/20 07:37:02   97 8 °F (36 6 °C)   75   16   112/68   --   96 %   --   --    08/06/20 22:16:32   98 2 °F (36 8 °C)   80   17   115/67   83   95 %   None (Room air)   Lying    08/06/20 16:14:44   98 6 °F (37 °C)   82   18   101/55   70   98 %   --   --    08/06/20 14:55:56   --   77   --   151/89   110   99 %   --   --    08/06/20 12:48:58   98 4 °F (36 9 °C)   62   18   119/65   83   97 %   --   --    08/06/20 1213   --   61   16   120/66              Pertinent Labs/Diagnostic Test Results:   8/6 PE Study with CT Abd/Pelvis - Right-sided nephroureteral stent positioning appears unchanged from the previous study, without evidence of perinephric collection or change in collecting system caliber  Left kidney unremarkable  Increased stool in the colon  No bowel obstruction    Mild diffuse bladder wall thickening which may be related to inflammation but should be correlated with urinalysis to exclude infectionOn this mildly motion limited study of the chest, no evidence of pulmonary embolism or acute infiltrate  Small pericardial effusion Results from last 7 days   Lab Units 08/07/20  0604 08/06/20  0643   WBC Thousand/uL 7 66 5 12   HEMOGLOBIN g/dL 10 4* 10 4*   HEMATOCRIT % 33 1* 33 1*   PLATELETS Thousands/uL 266 251   NEUTROS ABS Thousands/µL 6 09 3 79         Results from last 7 days   Lab Units 08/07/20  0604 08/06/20  0643   SODIUM mmol/L 138 140   POTASSIUM mmol/L 4 4 4 3   CHLORIDE mmol/L 103 106   CO2 mmol/L 24 26   ANION GAP mmol/L 11 8   BUN mg/dL 26* 23   CREATININE mg/dL 1 08 1 00   EGFR ml/min/1 73sq m 55 61   CALCIUM mg/dL 9 8 9 7     Results from last 7 days   Lab Units 08/06/20  0643   AST U/L 5   ALT U/L 17   ALK PHOS U/L 137*   TOTAL PROTEIN g/dL 7 9   ALBUMIN g/dL 3 8   TOTAL BILIRUBIN mg/dL 0 24         Results from last 7 days   Lab Units 08/07/20  0604 08/06/20  0643   GLUCOSE RANDOM mg/dL 107 98       Results from last 7 days   Lab Units 08/06/20  0643   LIPASE u/L 79       Results from last 7 days   Lab Units 08/06/20  0956   CLARITY UA  Cloudy   COLOR UA  Yellow   SPEC GRAV UA  1 015   PH UA  5 5   GLUCOSE UA mg/dl Negative   KETONES UA mg/dl Negative   BLOOD UA  Small*   PROTEIN UA mg/dl 30 (1+)*   NITRITE UA  Negative   BILIRUBIN UA  Negative   UROBILINOGEN UA E U /dl 0 2   LEUKOCYTES UA  Large*   WBC UA /hpf 30-50*   RBC UA /hpf 2-4*   BACTERIA UA /hpf Occasional   EPITHELIAL CELLS WET PREP /hpf Occasional       Results from last 7 days   Lab Units 08/06/20  0956   URINE CULTURE  60,000-69,000 cfu/ml Oxidase Positive gram negative laly*     ED Treatment:   Medication Administration from 08/06/2020 0630 to 08/06/2020 1242       Date/Time Order Dose Route Action     08/06/2020 0733 HYDROmorphone (DILAUDID) injection 0 5 mg 0 5 mg Intravenous Given     08/06/2020 0746 iohexol (OMNIPAQUE) 350 MG/ML injection (MULTI-DOSE) 100 mL 100 mL Intravenous Given     08/06/2020 0853 HYDROmorphone (DILAUDID) injection 0 5 mg 0 5 mg Intravenous Given     08/06/2020 1211 levofloxacin (LEVAQUIN) IVPB (premix) 750 mg 150 mL 750 mg Intravenous New Bag     08/06/2020 1155 HYDROmorphone (DILAUDID) injection 0 5 mg 0 5 mg Intravenous Given        Past Medical History:   Diagnosis Date    Anemia     Chemotherapy follow-up examination     Depression     [facility]    Hyperglycemia     vx type 2 dm -- last assessed 4/1/14; resolved 11/7/17    Hyperlipidemia     Hypertension     Obesity     last assessed 10/14/17; resolved 11/7/17     Present on Admission:   [facility] ([facility] )   Benign essential hypertension   Major depression, chronic   Acute deep vein thrombosis (DVT) of proximal vein of lower extremity (HCC)      Admitting Diagnosis: UTI (urinary tract infection) [N39 0]  Flank pain [R10 9]  Left flank pain [R10 9]  Age/Sex: 60 y o  female     Admission Orders:  Scheduled Medications:  ARIPiprazole, 10 mg, Oral, Daily  aspirin, 81 mg, Oral, Daily  cholecalciferol, 1,000 Units, Oral, Daily  enoxaparin, 120 mg, Subcutaneous, Daily  folic acid, 1 mg, Oral, Daily  gemfibrozil, 600 mg, Oral, Daily  levofloxacin, 750 mg, Oral, Q24H  lisinopril, 5 mg, Oral, Daily  pantoprazole, 40 mg, Oral, Early Morning  venlafaxine, 75 mg, Oral, TID      Continuous IV Infusions: None     PRN Meds:  acetaminophen, 975 mg, Oral, Q8H PRN  docusate sodium, 100 mg, Oral, BID PRN  HYDROmorphone, 0 5 mg, Intravenous, Q4H PRN 8/6 x1, 8/7 x1  methocarbamol, 500 mg, Oral, Q6H PRN 8/6 x1  ondansetron, 4 mg, Oral, Q6H PRN  oxyCODONE, 5 mg, Oral, Q6H PRN 8/6 x1  senna, 1 tablet, Oral, HS PRN  traMADol, 50 mg, Oral, Q6H PRN      Network Utilization Review Department  [email or web address]  ATTENTION: Please call with any questions or concerns to [phone] and carefully listen to the prompts so that you are directed to the right person   All voicemails are confidential   Gracia Gray all requests for admission clinical reviews, approved or denied determinations and any other requests to dedicated fax number below belonging to the campus where the patient is receiving treatment   List of dedicated fax numbers for the Facilities:  [address] [place] [address] DENIALS (Administrative/Medical Necessity) [phone]   [address]  (Maternity/NICU/Pediatrics) [phone]   Jono Monroy [phone]   Myron Jorge [phone]   Ignacia Morales [phone]   Savannah Castelan [phone]   [address] [place] [address] [phone]   [facility]  [phone]   [address] [place] [address], [facility]  [address] [facility] [address] [facility] [phone]

## 2020-08-07 NOTE — PROGRESS NOTES
Outpatient Oncology Nutrition Consult  Type of Consult: Follow Up  Care Location: Telephone Call   Nutrition Assessment:  PMH: anemia, depression, DM, HLD, HTN, obesity, RNYGB (2001 at [facility])  Oncology Diagnosis & Treatments:   Endometrial cancer  -S/p surgery 12/19/17      -Recurrence 7/8/19      -S/p chemotherapy (carboplatin/taxol from 7/30/19-1/6/20)  -S/p whole pelvis RT (2/4/20- 4/13/20)  Oncology History   Follow up post adjuvant external beam radiation therapy to the whole pelvis followed by boost to gross disease for recurrent right pelvic adenocarcinoma completed completed on 4/13/20 5/6/20 Jannie Ryan MD  Right percutaneous nephrostomy tube with clear urine  Negative GYN exam    5/13/20 PET scheduled  7/1/20 IR for tube change  8/5/20 Jannie Ryan MD       Endometrial cancer [facility])   11/17/2017 Initial Diagnosis    Endometrial cancer ([facility] [address] )     11/17/2017 Biopsy    ENDOMETRIAL BIOPSY: WELL DIFFERENTIATED ENDOMETRIAL ADENOCARCINOMA (FIGO I) WITH FOCALMUCINOUS FEATURES  Part B: ENDOCERVICAL POLYP:BENIGN ENDOCERVICAL      12/19/2017 Surgery    Robotic assisted total laparoscopic hysterectomy with bilateral salpingo-oophorectomy and sentinel bilateral pelvic lymph node dissection  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 (4 4 x 3 2 cm tumor, 9 4/15 4mm invasion, NO LVSI, washings revealed atypical cellular changes)     12/19/2017 Genetic Testing    Morrison testing negative     6/28/2019 Biopsy    A  Breast, Right, US BX Right Breast 1000 4 cmfn:  - Benign breast tissue with focal histiocytic aggregate  See comment   - Negative for atypia and in-situ or invasive carcinoma  7/8/2019 Recurrence    Presented with right lower extremity DVT and CT demonstrating right pelvic sidewall mass with venous, ureteral and nerve compression causing significant neuropathic pain  Biopsy:  Lymph Node, Right pelvic lymph node x3:  - High-grade adenocarcinoma  7/30/2019 - 1/6/2020 Chemotherapy    Taxol 175 mg/m2 and carboplatin AUC 6 every 21 days  Dose was reduced to taxol 135 mg/m2 and carboplatin AUC 5  Completed 6 cycles  Treatment protracted due to multiple hospital admissions       2/24/2020 - 4/13/2020 Radiation    adjuvant external beam radiation therapy to the whole pelvis to 4500 cGy followed by boost to gross disease of an additional 2200 cGy       Past Medical History:   Diagnosis Date    Anemia     Chemotherapy follow-up examination     Depression     Endometrial cancer ([facility] [address] ) 12/2017    Hyperglycemia     vx type 2 dm -- last assessed 4/1/14; resolved 11/7/17    Hyperlipidemia     Hypertension     Obesity     last assessed 10/14/17; resolved 11/7/17     Past Surgical History:   Procedure Laterality Date    ABDOMINAL SURGERY      GASTRIC BYPASS    CHOLECYSTECTOMY      at the time of gastric bypass    COLONOSCOPY      CT NEEDLE BIOPSY LYMPH NODE  7/8/2019    FL GUIDED CENTRAL VENOUS ACCESS DEVICE INSERTION  11/12/2019    GASTRIC BYPASS      HYSTERECTOMY Bilateral     total abdominal with salpingo-oophorectomy    IR PICC LINE  9/27/2019    IR PORT PLACEMENT  7/26/2019    IR PORT REMOVAL  9/20/2019    IR TUBE PLACEMENT NEPHROSTOMY  7/26/2019    OOPHORECTOMY Bilateral     MS INSJ TUNNELED CTR VAD W/SUBQ PORT AGE 5 YR/> Left 11/12/2019    Procedure: INSERTION VENOUS PORT ( PORT-A-CATH) IR;  Surgeon: Mone Garcia DO;  [facility];  Service: Interventional Radiology    MS LAP, RADICAL HYST W/ TUBE&OV, NODE BX N/A 12/19/2017    Procedure: HYSTERECTOMY LAPAROSCOPIC TOTAL ([address]) W/ ROBOTICS; BILATERAL SALPINGOOPHERECTOMY; LYMPH NODE DISSECTION; lysis of adhesions;  Surgeon: Arsh Fox MD;  Location: [facility];  Service: Gynecology Oncology    MS LAP,DIAGNOSTIC ABDOMEN N/A 12/19/2017    Procedure: LAPAROSCOPY DIAGNOSTIC;  Surgeon: Arsh Fox MD;  [facility];  Service: Gynecology Oncology    TONSILLECTOMY      US GUIDED BREAST BIOPSY RIGHT COMPLETE Right 6/28/2019       Review of Medications:   Vitamins, Supplements and Herbals: yes: Vitamin D, Folic Acid, Align, calcium citrate TID, B12 1000 mcg QD, MVI BID (MVI does not have iron); Iron 65 mg QD (2 hrs seperate from calcium) - Hx RNYGB      Current Outpatient Medications:     acetaminophen (TYLENOL) 325 mg tablet, Take 2 tablets (650 mg total) by mouth every 6 (six) hours as needed for mild pain, headaches or fever, Disp: 30 tablet, Rfl: 0    ARIPiprazole (ABILIFY) 10 mg tablet, Take 10 mg by mouth daily, Disp: , Rfl:     aspirin (ECOTRIN LOW STRENGTH) 81 mg EC tablet, Take 81 mg by mouth daily, Disp: , Rfl:     Calcium-Magnesium-Vitamin D (CALCIUM 500 PO), Take by mouth, Disp: , Rfl:     cholecalciferol (VITAMIN D3) 1,000 units tablet, Take 1,000 Units by mouth daily, Disp: , Rfl:     Cyanocobalamin (VITAMIN B12 PO), Take by mouth, Disp: , Rfl:     enoxaparin (Lovenox) 120 mg/0 8 mL, Inject 0 8 mL (120 mg total) under the skin daily, Disp: 30 Syringe, Rfl: 3    folic acid (FOLVITE) 1 mg tablet, Take 1 tablet (1 mg total) by mouth daily, Disp: , Rfl: 0    gemfibrozil (LOPID) 600 mg tablet, TAKE 1 TABLET BY MOUTH EVERY DAY, Disp: 90 tablet, Rfl: 1    levofloxacin (LEVAQUIN) 750 mg tablet, Take 1 tablet (750 mg total) by mouth every 24 hours for 7 days, Disp: 7 tablet, Rfl: 0    methocarbamol (ROBAXIN) 500 mg tablet, Take 1 tablet (500 mg total) by mouth every 8 (eight) hours for 3 days, Disp: 9 tablet, Rfl: 0    Multiple Vitamin (MULTIVITAMIN) tablet, Take 1 tablet by mouth daily, Disp: , Rfl:     omeprazole (PriLOSEC) 20 mg delayed release capsule, Take 20 mg by mouth daily, Disp: , Rfl:     oxyCODONE (ROXICODONE) 5 mg immediate release tablet, Take 1 tablet (5 mg total) by mouth every 4 (four) hours as needed for moderate painMax Daily Amount: 30 mg, Disp: 30 tablet, Rfl: 0    quinapril (ACCUPRIL) 5 mg tablet, TAKE 1 TABLET BY MOUTH EVERYDAY AT BEDTIME, Disp: 90 tablet, Rfl: 1    saccharomyces boulardii (FLORASTOR) 250 mg capsule, Take 1 capsule (250 mg total) by mouth 2 (two) times a day, Disp: , Rfl: 0    traMADol (ULTRAM) 50 mg tablet, Take 1 tablet (50 mg total) by mouth every 6 (six) hours as needed for moderate pain, Disp: 60 tablet, Rfl: 0    venlafaxine (EFFEXOR) 75 mg tablet, Take 75 mg by mouth 3 (three) times a day  , Disp: , Rfl:     Most Recent Lab Results:  Lab Results   Component Value Date    WBC 5 51 08/08/2020    IRON 24 (L) 10/02/2019    TIBC 138 (L) 10/02/2019    FERRITIN 1,042 (H) 10/02/2019    CHOLESTEROL 193 11/01/2018    CHOL 183 10/27/2017    TRIG 88 11/01/2018    HDL 59 11/01/2018    LDLCALC 116 (H) 11/01/2018    ALT 17 08/06/2020    AST 5 08/06/2020    ALB 3 8 08/06/2020     10/27/2017     05/12/2017    SODIUM 137 08/08/2020    SODIUM 138 08/07/2020    K 4 0 08/08/2020    K 4 4 08/07/2020     08/08/2020    BUN 27 (H) 08/08/2020    BUN 26 (H) 08/07/2020    CREATININE 0 96 08/08/2020    CREATININE 1 08 08/07/2020    EGFR 64 08/08/2020    PHOS 3 5 12/25/2019    PHOS 3 2 07/08/2019    GLUCOSE 219 (H) 12/19/2017    POCGLU 97 01/27/2020    GLUF 85 01/17/2020    GLUF 133 (H) 01/08/2020    GLUC 96 08/08/2020    HGBA1C 5 1 02/03/2020    HGBA1C 6 2 08/28/2019    HGBA1C 5 5 11/01/2018    CALCIUM 9 2 08/08/2020    FOLATE 5 4 10/06/2019    MG 1 9 03/11/2020     Anthropometric Measurements:   Height: 59"  Ht Readings from Last 1 Encounters:   08/06/20 4' 11" (1 499 m)     -Weight History:   Usual Weight: 270# before RNYGB in 2001; lowest post-op wt: 200#; wt typically fluctuates between 215-230#  Ideal Body Weight: 95#  Wt Readings from Last 20 Encounters:   08/06/20 86 9 kg (191 lb 9 6 oz)   08/05/20 88 1 kg (194 lb 3 2 oz)   07/30/20 88 5 kg (195 lb)   06/30/20 88 8 kg (195 lb 12 8 oz)   06/08/20 88 5 kg (195 lb)   05/06/20 83 9 kg (185 lb)   02/04/20 85 5 kg (188 lb 6 4 oz)   02/03/20 85 7 kg (189 lb)   01/31/20 83 9 kg (185 lb) 01/24/20 83 9 kg (185 lb)   01/08/20 83 9 kg (185 lb)   01/06/20 84 4 kg (186 lb)   01/05/20 84 4 kg (186 lb 1 1 oz)   01/03/20 84 4 kg (186 lb)   01/02/20 84 4 kg (186 lb)   12/24/19 82 6 kg (182 lb 1 6 oz)   12/19/19 81 9 kg (180 lb 8 oz)   12/02/19 88 7 kg (195 lb 8 oz)   11/27/19 88 kg (193 lb 14 4 oz)   11/27/19 88 kg (194 lb)   -Varian: (2/25/20) 185 6#, (3/3/20) 188 6#, (3/10/20) 187 2#, (3/19/20) 186 2#, (3/24/20) 187#, (3/31/20) 185 4#, (4/10/20) 184  4#   -Home Scale Wts: (2/19/20) 185#, (8/3/20) 194#     Oncology Nutrition-Anthropometrics      Most Recent Value   Patient (female) height (in):  61 in   Current Weight to be used for anthropometric calculations (lbs)  191 6 lbs   Current Weight to be used for anthropometric calculations (kg)  87 kg   IBW female:  95 lbs   IBW (kg) female:  43 2 kg   IBW % (female)  201 7 %   Adjusted BW (female):  119 lbs   Weight change after 1 month (lbs)  -4 lbs           Estimated body mass index is 38 7 kg/m² as calculated from the following:    Height as of 8/6/20: 4' 11" (1 499 m)  Weight as of 8/6/20: 86 9 kg (191 lb 9 6 oz)       Nutrition-Focused Physical Findings: n/a due to telephone call     Food/Nutrition-Related History & Client/Social History:    Current Nutrition Impact Symptoms:  [] Nausea  [] Reduced Appetite  [] Acid Reflux   [] Vomiting  [] Unintended Wt Loss  [] Malabsorption    [] Diarrhea  [] Unintended Wt Gain  [] Dumping Syndrome   [] Constipation - hx [] Thick Mucous/Secretions  [] Abdominal Pain    [] Dysgeusia (Altered Taste) [] Xerostomia (Dry Mouth) - under control; using Biotene toothpaste + mouthwash [] Gas    [] Dysosmia (Altered Smell)  [] Thrush  [] Difficulty Chewing    [] Oral Mucositis (Sore Mouth)  [x] Fatigue  [] Other:    [] Odynophagia   [] Esophagitis  [] Other:    [] Dysphagia [] Early Satiety  [x] No Problems Eating      Food Allergies: yes: had skin patch testing which showed allergy to strawberries, but is able to eat strawberries without any side effects  Food Intolerances: no    Current Diet: Regular Diet, No Restrictions and Small Frequent Meals    -Started Weight Watchers in 2020 for more structure  Current Nutrition Intake: Increased since last visit  Appetite: Good  Nutrition Route: PO  Meal planning/preparation mainly done by: mother does cooking; sister does shopping  Activity level: Was doing PT but recently stopped  Plans to start old PT exercises from previous visit  Fatigues easily  Social Hx: 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 - plans to go back to work in the 2020  Brother lives in [facility]  Discharged from [employer] on 19  Lives with mom  25 Hr Diet Recall: has trouble incorporating vegetables; was trying new recipes once per week x3 weeks but then back pain and hospitalizations got her off track  Breakfast: 1/2 everything bagel with cream cheese OR sausage McMuffin OR 1 egg with toast  -  Encouraged adding protein to breakfast  Snack: 1 pc or 1/2-3/4 cup fresh fruit (strawberries)  Lunch: leftover [employer] 3 oz fiesta chicken (cheese, salsa), 1/2 cup rice  Dinner: single serving [employer] butternut squash ravioli, 1/2 cup grapes  Snack: strawberries with whipped topping    Beverages: water (16 9 oz x3-4), regular unsweetened tea (16 oz x1 + 8-10 oz x1)   -Does not drink coffee or alcohol      -Does not separate fluids from solids, does not have discomfort while eating and drinking at the same time    Supplements:    None       Oncology Nutrition-Estimated Needs      Nutrition from 2020 in [facility] Diet[facility]   Weight type used  Adjusted weight   Weight in pounds (lbs) used for estimated needs  119 lbs   Weight in kilograms (kg) used for estimated needs  54 1 kg   Energy needs based on 25 kcal/k kcal   Energy needs based on 30 kcal/k kcal   Protein needs based on 1 g/kg  54 g Protein needs based on 1 2 g/k g   Fluid needs based on 25 mL/kg  1350 mL   Fluid needs in ounces  46 oz   Fluid needs based on 30 mL/kg  1620 mL   Fluid needs in ounces  55 oz          Discussion/Summary: Jorge Willett was contacted today for RD follow up  Our visit focused on her progress towards a cancer preventative diet post-tx  She denies difficulty eating and her only sx is lingering fatigue  She was doing very well for 3 weeks with her nutrition plan that was established last visit, but then had issues with back pain and was hospitalized causing her to go off track  She started Weight Watchers in July for more structure  She says she is going back to work at the end of August (15 hrs/week)  Her food recall reveals an inadequate vegetable intake and limited protein at breakfast and snacks  She tried going to Physical Therapy, but it didn't work out for her  She plans to start some home exercises instead  Today, Jorge Willett refocused herself on her nutrition plan and set new goals for herself  Her goals are to 1 ) increase her vegetable intake and 2 ) make 1 new healthy recipe weekly  We will follow up in ~1 months to check on progress  Discussed/Reviewed:   · weight management  · encouraged eating every 2-3 hours (5-6 small meals/day)  · adequate hydation & tips to increase overall fluid intake  · MNT for: RNY Gastric Bypass  · a cancer preventative eating pattern as a long-term nutrition goal   · RNY Gastric Bypass lifetime vitamin/mineral supplementation  All questions and concerns addressed during todays visit  Jorge Willett has RD contact information  Nutrition Diagnosis:  · Overweight/obesity related to Excess energy intake and Physical Inactivity as evidenced by BMI more than normative standard for age and sex (obesity-grade II 35-39 9)    Intervention & Recommendations:  Topics addressed: Nutrition education, Balance/Variety, Meals & Snacks, Meal planning, Choosing high protein meals/snacks, Meal pattern: eating small/frequent meals (every 2-3 hours), Adequate Hydration, Vitamin & Mineral Supplements and Weight Management    Barriers: None  Readiness to change: action  Comprehension: verbalizes understanding  Expected Compliance: good    Materials Provided: not applicable    Monitoring & Evaluation:  Dietitian to Monitor: Food and Nutrition Intake, Nutrtion Impact Symptoms, Body Weight, Vitamin/Mineral Intake and Biochemical Data     Goals:  · Continue lifelong vitamin/mineral supplementation for RNY Gastric Bypass  · Follow a cancer-preventative diet   · Increase vegetable intake  · Make 1 new healthy recipe weekly    · Progress Towards Goals: Progressing and Goal(s) Met     Nutrition Rx & Recommendations:  · Small, frequent meals/snacks may be easier to tolerate than 3 large daily meals  Aim for 5-6 small meals per day (every 2-3 hours)  · Include protein at all meals/snacks  · Follow a Cancer Preventative Nutrition Pattern: colorful, plant-based, low-fat, avoid added sugars, limit alcohol, include high fiber foods, limit processed meats, limit red meat, choose lean protein sources, use low-fat cooking methods, balance calories with physical activity, avoid excessive weight gain throughout life  · Incorporate physical activity as able/allowed  · Fluid Goal: 46-55 oz per day  · Lifetime vitamin/mineral supplementation recommended for Gastric Bypass Surgery:  · Multivitamin BID  · Iron 45 mg daily  · Vitamin D daily  · Calcium Citrate 500 mg TID (do not take with iron, needs to be 2 hrs apart from iron)  · B12 1000 mcg daily    · Resources for recipes: [employer], cancerdietitian  com, oncFireEyek org/support/nutrition-and-cancer, Skinnytaste  com, www oncologynutrition  org/erfc/recipes, www cookforyourlife  org, The Essential Cancer Treatment Nutrition Guide and Cookbook: Includes 311 Healthy and Delicious Recipes  Personalized Goals:  1   Increase vegetable intake  · Can use lightly sauced vegetables  · Keep frozen veggies on hand, consider adding some of these to your lightly sauced veggies  · Choose recipes with vegetables  · Add veggies to snacks  Quick ideas: baby carrots, sliced peppers, cucumbers, cherry tomatoes  Include protein with veggie snacks: plain humus individual cups or cheese stick  2  Try a new healthy recipe weekly  See websites provided  Back to School Plan:   · Keep protein bars, low-sugar protein shakes, Greek yogurt, and 1/4 cup portions of nuts at school  · Make more than 1 new recipe per week and bring leftovers for lunch  · Bring easy to grab veggie and protein snacks        Follow Up Plan: 9/18/20 at 10am for a phone follow up - we will discuss your transition back to work more at follow up  Recommend Referral to Other Providers: none at this time

## 2020-08-08 VITALS
BODY MASS INDEX: 38.63 KG/M2 | HEART RATE: 77 BPM | SYSTOLIC BLOOD PRESSURE: 120 MMHG | WEIGHT: 191.6 LBS | OXYGEN SATURATION: 99 % | HEIGHT: 59 IN | TEMPERATURE: 98.1 F | RESPIRATION RATE: 16 BRPM | DIASTOLIC BLOOD PRESSURE: 65 MMHG

## 2020-08-08 LAB
ANION GAP SERPL CALCULATED.3IONS-SCNC: 9 MMOL/L (ref 4–13)
BACTERIA UR CULT: ABNORMAL
BACTERIA UR CULT: ABNORMAL
BASOPHILS # BLD AUTO: 0.03 THOUSANDS/ΜL (ref 0–0.1)
BASOPHILS NFR BLD AUTO: 1 % (ref 0–1)
BUN SERPL-MCNC: 27 MG/DL (ref 5–25)
CALCIUM SERPL-MCNC: 9.2 MG/DL (ref 8.3–10.1)
CHLORIDE SERPL-SCNC: 105 MMOL/L (ref 100–108)
CO2 SERPL-SCNC: 23 MMOL/L (ref 21–32)
CREAT SERPL-MCNC: 0.96 MG/DL (ref 0.6–1.3)
EOSINOPHIL # BLD AUTO: 0.02 THOUSAND/ΜL (ref 0–0.61)
EOSINOPHIL NFR BLD AUTO: 0 % (ref 0–6)
ERYTHROCYTE [DISTWIDTH] IN BLOOD BY AUTOMATED COUNT: 13.4 % (ref 11.6–15.1)
GFR SERPL CREATININE-BSD FRML MDRD: 64 ML/MIN/1.73SQ M
GLUCOSE SERPL-MCNC: 96 MG/DL (ref 65–140)
HCT VFR BLD AUTO: 30.3 % (ref 34.8–46.1)
HGB BLD-MCNC: 9.5 G/DL (ref 11.5–15.4)
IMM GRANULOCYTES # BLD AUTO: 0.08 THOUSAND/UL (ref 0–0.2)
IMM GRANULOCYTES NFR BLD AUTO: 2 % (ref 0–2)
LYMPHOCYTES # BLD AUTO: 0.62 THOUSANDS/ΜL (ref 0.6–4.47)
LYMPHOCYTES NFR BLD AUTO: 11 % (ref 14–44)
MCH RBC QN AUTO: 30.4 PG (ref 26.8–34.3)
MCHC RBC AUTO-ENTMCNC: 31.4 G/DL (ref 31.4–37.4)
MCV RBC AUTO: 97 FL (ref 82–98)
MONOCYTES # BLD AUTO: 0.57 THOUSAND/ΜL (ref 0.17–1.22)
MONOCYTES NFR BLD AUTO: 10 % (ref 4–12)
NEUTROPHILS # BLD AUTO: 4.19 THOUSANDS/ΜL (ref 1.85–7.62)
NEUTS SEG NFR BLD AUTO: 76 % (ref 43–75)
NRBC BLD AUTO-RTO: 0 /100 WBCS
PLATELET # BLD AUTO: 238 THOUSANDS/UL (ref 149–390)
PMV BLD AUTO: 9.9 FL (ref 8.9–12.7)
POTASSIUM SERPL-SCNC: 4 MMOL/L (ref 3.5–5.3)
RBC # BLD AUTO: 3.13 MILLION/UL (ref 3.81–5.12)
SODIUM SERPL-SCNC: 137 MMOL/L (ref 136–145)
WBC # BLD AUTO: 5.51 THOUSAND/UL (ref 4.31–10.16)

## 2020-08-08 PROCEDURE — 80048 BASIC METABOLIC PNL TOTAL CA: CPT | Performed by: PSYCHIATRY & NEUROLOGY

## 2020-08-08 PROCEDURE — 85025 COMPLETE CBC W/AUTO DIFF WBC: CPT | Performed by: PSYCHIATRY & NEUROLOGY

## 2020-08-08 PROCEDURE — 99239 HOSP IP/OBS DSCHRG MGMT >30: CPT | Performed by: INTERNAL MEDICINE

## 2020-08-08 RX ORDER — METHOCARBAMOL 500 MG/1
500 TABLET, FILM COATED ORAL EVERY 8 HOURS SCHEDULED
Status: DISCONTINUED | OUTPATIENT
Start: 2020-08-08 | End: 2020-08-08 | Stop reason: HOSPADM

## 2020-08-08 RX ORDER — LEVOFLOXACIN 750 MG/1
750 TABLET ORAL EVERY 24 HOURS
Qty: 7 TABLET | Refills: 0 | Status: SHIPPED | OUTPATIENT
Start: 2020-08-09 | End: 2020-08-16

## 2020-08-08 RX ORDER — OXYCODONE HYDROCHLORIDE 5 MG/1
5 TABLET ORAL EVERY 4 HOURS PRN
Qty: 30 TABLET | Refills: 0 | Status: SHIPPED | OUTPATIENT
Start: 2020-08-08 | End: 2020-10-14 | Stop reason: ALTCHOICE

## 2020-08-08 RX ORDER — DOCUSATE SODIUM 100 MG/1
100 CAPSULE, LIQUID FILLED ORAL 2 TIMES DAILY
Status: DISCONTINUED | OUTPATIENT
Start: 2020-08-08 | End: 2020-08-08 | Stop reason: HOSPADM

## 2020-08-08 RX ORDER — METHOCARBAMOL 500 MG/1
500 TABLET, FILM COATED ORAL EVERY 8 HOURS SCHEDULED
Qty: 9 TABLET | Refills: 0 | Status: SHIPPED | OUTPATIENT
Start: 2020-08-08 | End: 2020-08-13

## 2020-08-08 RX ADMIN — PANTOPRAZOLE SODIUM 40 MG: 40 TABLET, DELAYED RELEASE ORAL at 05:12

## 2020-08-08 RX ADMIN — ENOXAPARIN SODIUM 120 MG: 120 INJECTION SUBCUTANEOUS at 08:03

## 2020-08-08 RX ADMIN — METHOCARBAMOL TABLETS 500 MG: 500 TABLET, COATED ORAL at 12:11

## 2020-08-08 RX ADMIN — OXYCODONE HYDROCHLORIDE 5 MG: 5 TABLET ORAL at 01:58

## 2020-08-08 RX ADMIN — BISACODYL 5 MG: 5 TABLET, COATED ORAL at 10:26

## 2020-08-08 RX ADMIN — SODIUM CHLORIDE 100 ML/HR: 0.9 INJECTION, SOLUTION INTRAVENOUS at 08:07

## 2020-08-08 RX ADMIN — FOLIC ACID 1 MG: 1 TABLET ORAL at 08:04

## 2020-08-08 RX ADMIN — LISINOPRIL 5 MG: 5 TABLET ORAL at 08:04

## 2020-08-08 RX ADMIN — VENLAFAXINE 75 MG: 37.5 TABLET ORAL at 08:04

## 2020-08-08 RX ADMIN — LEVOFLOXACIN 750 MG: 750 TABLET, FILM COATED ORAL at 12:13

## 2020-08-08 RX ADMIN — OXYCODONE HYDROCHLORIDE 5 MG: 5 TABLET ORAL at 10:26

## 2020-08-08 RX ADMIN — Medication 1000 UNITS: at 08:04

## 2020-08-08 RX ADMIN — GEMFIBROZIL 600 MG: 600 TABLET ORAL at 08:03

## 2020-08-08 RX ADMIN — ASPIRIN 81 MG: 81 TABLET, COATED ORAL at 08:04

## 2020-08-08 RX ADMIN — TRAMADOL HYDROCHLORIDE 50 MG: 50 TABLET, FILM COATED ORAL at 08:03

## 2020-08-08 RX ADMIN — ARIPIPRAZOLE 10 MG: 5 TABLET ORAL at 08:04

## 2020-08-08 NOTE — ASSESSMENT & PLAN NOTE
· Patient has had left lower back pain for a week  · Patient has been taking Tramadol and Tylenol for pain with minimum relief  · CT scan was negative for acute pathology  · Dilaudid given once in the ED for pain  Plan:  · Continue Tylenol for mild pain  · Robaxin 500 mg po q8h for pain  · Oxycodone 5 mg Q 6 p r n

## 2020-08-08 NOTE — ASSESSMENT & PLAN NOTE
· Hx of over active bladder and increased urinary frequency  · Oxybutynin and Mybetriq po qd  · CT Scan showed increased bladder wall thickening form UTI  Plan:  · Hold Oxybutynin and Mybetriq until seen by PCP due to UTI and bladder wall thickening due to UTI

## 2020-08-08 NOTE — PLAN OF CARE
Problem: Potential for Falls  Goal: Patient will remain free of falls  Description: INTERVENTIONS:  - Assess patient frequently for physical needs  -  Identify cognitive and physical deficits and behaviors that affect risk of falls    -  [place] fall precautions as indicated by assessment   - Educate patient/family on patient safety including physical limitations  - Instruct patient to call for assistance with activity based on assessment  - Modify environment to reduce risk of injury  - Consider OT/PT consult to assist with strengthening/mobility  Outcome: Progressing     Problem: GENITOURINARY - ADULT  Goal: Maintains or returns to baseline urinary function  Description: INTERVENTIONS:  - Assess urinary function  - Encourage oral fluids to ensure adequate hydration if ordered  - Administer IV fluids as ordered to ensure adequate hydration  - Administer ordered medications as needed  - Offer frequent toileting  - Follow urinary retention protocol if ordered  Outcome: Progressing     Problem: PAIN - ADULT  Goal: Verbalizes/displays adequate comfort level or baseline comfort level  Description: Interventions:  - Encourage patient to monitor pain and request assistance  - Assess pain using appropriate pain scale  - Administer analgesics based on type and severity of pain and evaluate response  - Implement non-pharmacological measures as appropriate and evaluate response  - Consider cultural and social influences on pain and pain management  - Notify physician/advanced practitioner if interventions unsuccessful or patient reports new pain  Outcome: Progressing     Problem: INFECTION - ADULT  Goal: Absence or prevention of progression during hospitalization  Description: INTERVENTIONS:  - Assess and monitor for signs and symptoms of infection  - Monitor lab/diagnostic results  - Monitor all insertion sites, i e  indwelling lines, tubes, and drains  - Monitor endotracheal if appropriate and nasal secretions for changes in amount and color  - [place] appropriate cooling/warming therapies per order  - Administer medications as ordered  - Instruct and encourage patient and family to use good hand hygiene technique  - Identify and instruct in appropriate isolation precautions for identified infection/condition  Outcome: Progressing     Problem: DISCHARGE PLANNING  Goal: Discharge to home or other facility with appropriate resources  Description: INTERVENTIONS:  - Identify barriers to discharge w/patient and caregiver  - Arrange for needed discharge resources and transportation as appropriate  - Identify discharge learning needs (meds, wound care, etc )  - Arrange for interpretive services to assist at discharge as needed  - Refer to Case Management Department for coordinating discharge planning if the patient needs post-hospital services based on physician/advanced practitioner order or complex needs related to functional status, cognitive ability, or social support system  Outcome: Progressing

## 2020-08-08 NOTE — DISCHARGE SUMMARY
Discharge- Adrian Mcfarland 1955, 60 y o  female MRN: [id number]    Unit/Bed#: W -01 Encounter: [id number]    Primary Care Provider: Beatriz Goodrich DO   Date and time admitted to hospital: 8/6/2020  6:31 AM    Acute left-sided low back pain without sciatica  Assessment & Plan  · Patient has had left lower back pain for a week  · Patient has been taking Tramadol and Tylenol for pain with minimum relief  · CT scan was negative for acute pathology  · Dilaudid given once in the ED for pain  Plan:  · Continue Tylenol for mild pain  · Robaxin 500 mg po q8h for pain  · Oxycodone 5 mg Q 6 p r n  Overactive bladder  Assessment & Plan  · Hx of over active bladder and increased urinary frequency  · Oxybutynin and Mybetriq po qd  · CT Scan showed increased bladder wall thickening form UTI  Plan:  · Hold Oxybutynin and Mybetriq until seen by PCP due to UTI and bladder wall thickening due to UTI  Acute deep vein thrombosis (DVT) of proximal vein of lower extremity (HCC)  Assessment & Plan  · Patient has hx of DVT  · On Lovenox 120 mg    Plan:  · Continue Lovenox 120 mg SC    Endometrial cancer ([facility] [address] )  Assessment & Plan  · Patient has hx of endometrial cancer with right ureter involvement  · Has percutaneous nephrostomy tube on the right  · Last follow up with GYN ONC was 08/05/2020  · Not on chemotherapy currently  · Stable    Plan:  · Stable patient to follow up outpatient with GYN ONC prn  Major depression, chronic  Assessment & Plan  · Hx of depression  · Denies being currently depressed or sad  Plan:  · Continue prior to admission medications    Benign essential hypertension  Assessment & Plan  · Patient has hx of HTN that is well controlled on Quinipril 5 mg qd  * UTI (urinary tract infection)  Assessment & Plan  · Patient has left flank pain for 1 day in the setting of lower back pain for 1 week  · Afebrile but has been taking tylenol for a week for back pain    · Has percutaneous nephrostomy on right secondary to endometrial cancer spread  Admits to less output from tube  · UA positive for WBCs, bacteria and blood  Negative nitrites  · Urine culture in 01/2020 grew Klebsiella that was resistant to PCN and Cephalosporins  · CT scan remarkable for increased bladder wall thickening indicative for UTI  · Received Levaquin 750 mg once in ED  · Urine culture grew Pseudomonas, that was sensitive to ciprofloxacin, therefore will continue patient on current regimen    Plan:  · Continue Levaquin 750 mg po qd for 7 more days  · Hold oxybutynin and Mybetriq until follows up with pcp        Discharging Resident Physician: Jocelyne Santillan DO  Attending: Miladis Rawls MD  PCP: Chel Vieyra DO  Admission Date: 8/6/2020  Discharge Date: 08/08/20    Disposition:     Home    Reason for Admission:  Complicated UTI    Consultations During Hospital Stay:  · None    Procedures Performed:   · None    Significant Findings / Test Results:   · No significant findings    Incidental Findings:   · No incidental findings    Test Results Pending at Discharge (will require follow up):   · No test results pending at discharge     Outpatient Tests Requested:  · No patient does requested    Complications:  No complications during hospital stay    Hospital Course:     David Watt is a 60 y o  female patient who originally presented to the hospital on 8/4/3413 due to a complicated UTI  The patient initially presented with moderate to severe dull intermittent pain on the left flank area that started the day prior to admission  She reported that it felt like a pulled muscle"  Pain did not radiate  Patient admits to urinary frequency with no dysuria, hematuria, nocturia  The patient has a nephrostomy tube on the right  Patient denied any fever but has been on tylenol frequently for back pain  Back pain started a month prior to admission  No chills, hematuria, abdominal pain, nausea or vomiting  No diarrhea or constipation  CT scans showed increased bladder wall thickening which was indicative of a UTI  Urinalysis was positive for leukocytosis proteinuria hematuria and positive for bacteria  The patient was started on levofloxacin 350 mg admitted to the hospital   Urine culture eventually grew Pseudomonas which showed a sensitivity towards levofloxacin, so treatment was continued  Patient was prescribed Robaxin, OxyContin, tramadol, and Dilaudid as needed for back pain while in the hospital   On discharge the patient was continued on levofloxacin 350 mg daily for 7 more days for a total of 10 days treatment, the patient was also given a prescription for Robaxin 500 mg every 8 hours scheduled and oxycodone 5 mg for pain every 6 hours as needed (20 pills)        Condition at Discharge: good     Discharge Day Visit / Exam:     Subjective:  Patient was seen and examined while she was sitting in her room having breakfast   Do patient reports that she did have some kinking urine overnight however it has been improving she believes that this is most likely due to her Lovenox as this has happened before  Otherwise no acute events overnight  Vitals: Blood Pressure: 120/65 (08/08/20 0808)  Pulse: 77 (08/08/20 0808)  Temperature: 98 1 °F (36 7 °C) (08/08/20 0808)  Temp Source: Oral (08/07/20 1528)  Respirations: 16 (08/07/20 1528)  Height: 4' 11" (149 9 cm) (08/06/20 1213)  Weight - Scale: 86 9 kg (191 lb 9 6 oz) (08/06/20 1213)  SpO2: 99 % (08/08/20 0808)  Exam:   Physical Exam  Constitutional:       General: She is not in acute distress  Appearance: She is well-developed  She is not diaphoretic  HENT:      Head: Normocephalic and atraumatic  Nose: Nose normal    Eyes:      Conjunctiva/sclera: Conjunctivae normal       Pupils: Pupils are equal, round, and reactive to light  Cardiovascular:      Rate and Rhythm: Normal rate and regular rhythm  Heart sounds: No murmur  No friction rub   No gallop  Pulmonary:      Effort: Pulmonary effort is normal  No respiratory distress  Breath sounds: Normal breath sounds  Abdominal:      Palpations: Abdomen is soft  Musculoskeletal:         General: Tenderness (L flank) present  Skin:     General: Skin is warm and dry  Neurological:      Mental Status: She is alert and oriented to person, place, and time  Psychiatric:         Behavior: Behavior normal          Thought Content: Thought content normal          Judgment: Judgment normal          Discussion with Family:  Discussed discharge plan with patient    Discharge instructions/Information to patient and family:   See after visit summary for information provided to patient and family  Provisions for Follow-Up Care:  See after visit summary for information related to follow-up care and any pertinent home health orders  Planned Readmission:  None     Discharge Medications:  See after visit summary for reconciled discharge medications provided to patient and family        ** Please Note: This note has been constructed using a voice recognition system **

## 2020-08-08 NOTE — ASSESSMENT & PLAN NOTE
· Hx of depression  · Denies being currently depressed or sad      Plan:  · Continue prior to admission medications

## 2020-08-08 NOTE — DISCHARGE INSTR - AVS FIRST PAGE
Dear Ze Medina,     It was our pleasure to care for you here at [facility]  It is our hope that we were always able to exceed the expected standards for your care during your stay  You were hospitalized due to a urinary tract infection  You were cared for on the [facility] 4th floor by [employer], DO under the service of [id number] [facility] MD Lisha with the [facility] who covers for your primary care physician (PCP), Melida Nicole DO, while you were hospitalized  If you have any questions or concerns related to this hospitalization, you may contact us at [phone]  For follow up as well as any medication refills, we recommend that you follow up with your primary care physician  A registered nurse will reach out to you by phone within a few days after your discharge to answer any additional questions that you may have after going home  However, at this time we provide for you here, the most important instructions / recommendations at discharge:     · Notable Medication Adjustments -   · Levofloxacin by Mouth for 7 more days (End on   · Robaxin 500 mg every 8 hours for muscle pain  · Oxycodone 5 mg every 6 hours as needed for pain  · Testing Required after Discharge -   · None  · Important follow up information -   · Please follow-up with your PCP within 1 weeks time  · Other Instructions-   Do NOT continue your Oxybutynin and Mybetriq until directed to by your PCP due to your UTI and the bladder wall thickening seen on imaging likely due to your UTI  · Please review this entire after visit summary as additional general instructions including medication list, appointments, activity, diet, any pertinent wound care, and other additional recommendations from your care team that may be provided for you        Sincerely,     [employer], DO

## 2020-08-08 NOTE — ASSESSMENT & PLAN NOTE
· Patient has left flank pain for 1 day in the setting of lower back pain for 1 week  · Afebrile but has been taking tylenol for a week for back pain  · Has percutaneous nephrostomy on right secondary to endometrial cancer spread  Admits to less output from tube  · UA positive for WBCs, bacteria and blood  Negative nitrites  · Urine culture in 01/2020 grew Klebsiella that was resistant to PCN and Cephalosporins  · CT scan remarkable for increased bladder wall thickening indicative for UTI    · Received Levaquin 750 mg once in ED  · Urine culture grew Pseudomonas, that was sensitive to ciprofloxacin, therefore will continue patient on current regimen    Plan:  · Continue Levaquin 750 mg po qd for 7 more days  · Hold oxybutynin and Mybetriq until follows up with pcp

## 2020-08-10 ENCOUNTER — TRANSITIONAL CARE MANAGEMENT (OUTPATIENT)
Dept: FAMILY MEDICINE CLINIC | Facility: CLINIC | Age: 65
End: 2020-08-10

## 2020-08-10 NOTE — UTILIZATION REVIEW
Notification of Discharge  This is a Notification of Discharge from our facility [address]  Please be advised that this patient has been discharge from our facility  Below you will find the admission and discharge date and time including the patients disposition  PRESENTATION DATE: 8/6/2020  6:31 AM  OBS ADMISSION DATE:   IP ADMISSION DATE: 8/6/20 1213   DISCHARGE DATE: 8/8/2020 12:57 PM  DISPOSITION: Home/Self Care Home/Self Care   Admission Orders listed below:  Admission Orders (From admission, onward)     Ordered        08/06/20 1215  Inpatient Admission  Once                   Please contact the UR Department if additional information is required to close this patient's authorization/case  [address] [facility] Utilization Review Department  Main: [phone] x carefully listen to the prompts  All voicemails are confidential   [email or web address]  Send all requests for admission clinical reviews, approved or denied determinations and any other requests to dedicated fax number below belonging to the campus where the patient is receiving treatment   List of dedicated fax numbers:  [address] DENIALS (Administrative/Medical Necessity) [phone]   [address] (Maternity/NICU/Pediatrics) [phone]   Lian Flatten [phone]   Jaydon Ortega [phone]   Ирина Barboza [phone]   145 [facility] 1525 [facility] [phone]   [facility]  [phone]   2208 [facility] [address]  2401 [facility] 1000 [address] [facility] [phone]

## 2020-08-12 ENCOUNTER — NUTRITION (OUTPATIENT)
Dept: NUTRITION | Facility: CLINIC | Age: 65
End: 2020-08-12

## 2020-08-12 DIAGNOSIS — Z71.3 NUTRITIONAL COUNSELING: Primary | ICD-10-CM

## 2020-08-12 NOTE — PATIENT INSTRUCTIONS
Nutrition Rx & Recommendations:  · Small, frequent meals/snacks may be easier to tolerate than 3 large daily meals  Aim for 5-6 small meals per day (every 2-3 hours)  · Include protein at all meals/snacks  · Follow a Cancer Preventative Nutrition Pattern: colorful, plant-based, low-fat, avoid added sugars, limit alcohol, include high fiber foods, limit processed meats, limit red meat, choose lean protein sources, use low-fat cooking methods, balance calories with physical activity, avoid excessive weight gain throughout life  · Incorporate physical activity as able/allowed  · Fluid Goal: 46-55 oz per day  · Lifetime vitamin/mineral supplementation recommended for Gastric Bypass Surgery:  · Multivitamin BID  · Iron 45 mg daily  · Vitamin D daily  · Calcium Citrate 500 mg TID (do not take with iron, needs to be 2 hrs apart from iron)  · B12 1000 mcg daily    · Resources for recipes: [employer], cancerdietitian  com, oncTwingly/support/nutrition-and-cancer, Skinnytaste  com, www oncologynutrition  org/erfc/recipes, www cookforyourlife  org, The Essential Cancer Treatment Nutrition Guide and Cookbook: Includes 749 Healthy and Delicious Recipes  Personalized Goals:  1  Increase vegetable intake  · Can use lightly sauced vegetables  · Keep frozen veggies on hand, consider adding some of these to your lightly sauced veggies  · Choose recipes with vegetables  · Add veggies to snacks  Quick ideas: baby carrots, sliced peppers, cucumbers, cherry tomatoes  Include protein with veggie snacks: plain humus individual cups or cheese stick  2  Try a new healthy recipe weekly  See websites provided  Back to School Plan:   · Keep protein bars, low-sugar protein shakes, Greek yogurt, and 1/4 cup portions of nuts at school  · Make more than 1 new recipe per week and bring leftovers for lunch  · Bring easy to grab veggie and protein snacks        Follow Up Plan: 9/18/20 at 10am for a phone follow up - radha will discuss your transition back to work more at follow up  Recommend Referral to Other Providers: none at this time

## 2020-08-13 ENCOUNTER — OFFICE VISIT (OUTPATIENT)
Dept: FAMILY MEDICINE CLINIC | Facility: CLINIC | Age: 65
End: 2020-08-13
Payer: COMMERCIAL

## 2020-08-13 VITALS
DIASTOLIC BLOOD PRESSURE: 70 MMHG | BODY MASS INDEX: 38.1 KG/M2 | HEART RATE: 80 BPM | HEIGHT: 59 IN | OXYGEN SATURATION: 98 % | SYSTOLIC BLOOD PRESSURE: 108 MMHG | WEIGHT: 189 LBS | TEMPERATURE: 98.6 F

## 2020-08-13 DIAGNOSIS — N32.81 OVERACTIVE BLADDER: ICD-10-CM

## 2020-08-13 DIAGNOSIS — N39.0 URINARY TRACT INFECTION WITHOUT HEMATURIA, SITE UNSPECIFIED: ICD-10-CM

## 2020-08-13 DIAGNOSIS — F32.9 MAJOR DEPRESSION, CHRONIC: ICD-10-CM

## 2020-08-13 DIAGNOSIS — M54.50 ACUTE LEFT-SIDED LOW BACK PAIN WITHOUT SCIATICA: Primary | ICD-10-CM

## 2020-08-13 DIAGNOSIS — I10 BENIGN ESSENTIAL HYPERTENSION: ICD-10-CM

## 2020-08-13 DIAGNOSIS — I82.4Y9 ACUTE DEEP VEIN THROMBOSIS (DVT) OF PROXIMAL VEIN OF LOWER EXTREMITY, UNSPECIFIED LATERALITY (HCC): ICD-10-CM

## 2020-08-13 DIAGNOSIS — C54.1 ENDOMETRIAL CANCER (HCC): Chronic | ICD-10-CM

## 2020-08-13 PROCEDURE — 3008F BODY MASS INDEX DOCD: CPT | Performed by: OBSTETRICS & GYNECOLOGY

## 2020-08-13 PROCEDURE — 99496 TRANSJ CARE MGMT HIGH F2F 7D: CPT | Performed by: INTERNAL MEDICINE

## 2020-08-13 PROCEDURE — 1111F DSCHRG MED/CURRENT MED MERGE: CPT | Performed by: INTERNAL MEDICINE

## 2020-08-13 NOTE — PROGRESS NOTES
TCM Call (since 7/16/2020)     Date and time call was made  8/10/2020  8:17 AM    Hospital care reviewed  Records reviewed    Patient was hospitialized at  [address] [place] [address]    Date of Admission  08/06/20    Date of discharge  08/08/20    Diagnosis  UTI (urinary tract infection)    Disposition  Home    Were the patients medications reviewed and updated  Yes    Current Symptoms  None      TCM Call (since 7/16/2020)     Post hospital issues  None    Scheduled for follow up? Yes    I have advised the patient to call PCP with any new or worsening symptoms  Natalio Rey MA    Counseling  Patient    Counseling topics  Importance of RX compliance      Patient was admitted August 6 and discharged August 8 she was admitted with low back pain and side pain without sciatica she was found have a UTI she was treated with appropriate antibiotics by the sensitivity  Assessment/Plan:          Diagnoses and all orders for this visit:    Acute left-sided low back pain without sciatica  Comments:  discussed to wean down on narcotics    Overactive bladder  Comments:  told her to restart oxybutinin and in 2 weeks mybetrique if need be/  she says she needs the med    Acute deep vein thrombosis (DVT) of proximal vein of lower extremity, unspecified laterality (HCC)  Comments:  on chr lovenox    Endometrial cancer ([facility] [address] )    Benign essential hypertension  Comments:  constrolled    Urinary tract infection without hematuria, site unspecified  Comments:  recheck u/a  Orders:  -     UA (URINE) with reflex to Scope    Major depression, chronic  Comments:  controlled          Subjective:      Patient ID: Fish Estrada is a 60 y o  female      Patient was admitted August 6 and discharged August 8 she was admitted with left lower back pain and side pain she is found have a UTI she was treated with Levaquin due to her sensitivities her blood pressure remained stable while she was in    her overactive bladder medication was held until she returned here for follow-up to allow her urine to drain      The following portions of the patient's history were reviewed and updated as appropriate: She  has a past medical history of Anemia, Chemotherapy follow-up examination, Depression, Endometrial cancer ([facility] [address] ) (12/2017), Hyperglycemia, Hyperlipidemia, Hypertension, and Obesity  She   Patient Active Problem List    Diagnosis Date Noted    UTI (urinary tract infection) 08/06/2020    Overactive bladder 08/06/2020    Acute left-sided low back pain without sciatica 08/06/2020    Bacteremia 01/08/2020    Obstruction of right ureter 01/03/2020    Hypomagnesemia 10/18/2019    Ambulatory dysfunction 10/11/2019    Moderate protein-calorie malnutrition ([facility] [address] ) 10/03/2019    Bilateral lower extremity edema 09/30/2019    Generalized weakness 09/29/2019    Hypokalemia 09/19/2019    Hyponatremia 09/19/2019    Dehydration 09/18/2019    Chemotherapy-induced nausea 09/18/2019    Anemia 09/06/2019    Chemotherapy induced neutropenia ([facility] [address] ) 08/30/2019    Cancer related pain 07/19/2019    Encounter for central line care 07/16/2019    Acute deep vein thrombosis (DVT) of proximal vein of lower extremity ([facility] [address] ) 06/19/2019    Breast cancer screening 06/19/2019    Benign essential hypertension 12/19/2017    Class 3 severe obesity with body mass index (BMI) of 45 0 to 49 9 in adult [facility]) 12/19/2017    Endometrial cancer ([facility] [address] ) 11/29/2017    Dyslipidemia 04/01/2014    Major depression, chronic 10/07/2013     She  has a past surgical history that includes Abdominal surgery; Colonoscopy; pr lap, radical hyst w/ tube&ov, node bx (N/A, 12/19/2017); pr lap,diagnostic abdomen (N/A, 12/19/2017); Cholecystectomy; Gastric bypass; Tonsillectomy; Hysterectomy (Bilateral); Oophorectomy (Bilateral); US guided breast biopsy right complete (Right, 6/28/2019); CT needle biopsy lymph node (7/8/2019); IR port Placement (7/26/2019); IR tube placement nephrostomy (7/26/2019);  IR port Removal (9/20/2019); IR PICC line (9/27/2019); pr insj tunneled ctr vad w/subq port age 13 yr/> (Left, 11/12/2019); and FL guided central venous access device insertion (11/12/2019)  Her family history includes Bone cancer in her maternal grandfather; Lymphoma in her father; No Known Problems in her brother, brother, maternal aunt, maternal aunt, maternal aunt, maternal aunt, maternal grandmother, paternal aunt, paternal aunt, paternal aunt, paternal aunt, paternal grandfather, and sister; Other in her mother; Ovarian cancer (age of onset: 48) in her mother; Uterine cancer in her paternal grandmother  She  reports that she has never smoked  She has never used smokeless tobacco  She reports previous alcohol use  She reports that she does not use drugs    Current Outpatient Medications   Medication Sig Dispense Refill    acetaminophen (TYLENOL) 325 mg tablet Take 2 tablets (650 mg total) by mouth every 6 (six) hours as needed for mild pain, headaches or fever 30 tablet 0    ARIPiprazole (ABILIFY) 10 mg tablet Take 10 mg by mouth daily      aspirin (ECOTRIN LOW STRENGTH) 81 mg EC tablet Take 81 mg by mouth daily      Calcium-Magnesium-Vitamin D (CALCIUM 500 PO) Take by mouth daily       cholecalciferol (VITAMIN D3) 1,000 units tablet Take 1,000 Units by mouth daily      Cyanocobalamin (VITAMIN B12 PO) Take by mouth daily       enoxaparin (Lovenox) 120 mg/0 8 mL Inject 0 8 mL (120 mg total) under the skin daily 30 Syringe 3    folic acid (FOLVITE) 1 mg tablet Take 1 tablet (1 mg total) by mouth daily  0    gemfibrozil (LOPID) 600 mg tablet TAKE 1 TABLET BY MOUTH EVERY DAY 90 tablet 1    levofloxacin (LEVAQUIN) 750 mg tablet Take 1 tablet (750 mg total) by mouth every 24 hours for 7 days 7 tablet 0    Multiple Vitamin (MULTIVITAMIN) tablet Take 1 tablet by mouth daily      omeprazole (PriLOSEC) 20 mg delayed release capsule Take 20 mg by mouth daily      oxyCODONE (ROXICODONE) 5 mg immediate release tablet Take 1 tablet (5 mg total) by mouth every 4 (four) hours as needed for moderate painMax Daily Amount: 30 mg 30 tablet 0    quinapril (ACCUPRIL) 5 mg tablet TAKE 1 TABLET BY MOUTH EVERYDAY AT BEDTIME 90 tablet 1    saccharomyces boulardii (FLORASTOR) 250 mg capsule Take 1 capsule (250 mg total) by mouth 2 (two) times a day  0    traMADol (ULTRAM) 50 mg tablet Take 1 tablet (50 mg total) by mouth every 6 (six) hours as needed for moderate pain 60 tablet 0    venlafaxine (EFFEXOR) 75 mg tablet Take 75 mg by mouth 3 (three) times a day         No current facility-administered medications for this visit        Current Outpatient Medications on File Prior to Visit   Medication Sig    acetaminophen (TYLENOL) 325 mg tablet Take 2 tablets (650 mg total) by mouth every 6 (six) hours as needed for mild pain, headaches or fever    ARIPiprazole (ABILIFY) 10 mg tablet Take 10 mg by mouth daily    aspirin (ECOTRIN LOW STRENGTH) 81 mg EC tablet Take 81 mg by mouth daily    Calcium-Magnesium-Vitamin D (CALCIUM 500 PO) Take by mouth daily     cholecalciferol (VITAMIN D3) 1,000 units tablet Take 1,000 Units by mouth daily    Cyanocobalamin (VITAMIN B12 PO) Take by mouth daily     enoxaparin (Lovenox) 120 mg/0 8 mL Inject 0 8 mL (120 mg total) under the skin daily    folic acid (FOLVITE) 1 mg tablet Take 1 tablet (1 mg total) by mouth daily    gemfibrozil (LOPID) 600 mg tablet TAKE 1 TABLET BY MOUTH EVERY DAY    levofloxacin (LEVAQUIN) 750 mg tablet Take 1 tablet (750 mg total) by mouth every 24 hours for 7 days    Multiple Vitamin (MULTIVITAMIN) tablet Take 1 tablet by mouth daily    omeprazole (PriLOSEC) 20 mg delayed release capsule Take 20 mg by mouth daily    oxyCODONE (ROXICODONE) 5 mg immediate release tablet Take 1 tablet (5 mg total) by mouth every 4 (four) hours as needed for moderate painMax Daily Amount: 30 mg    quinapril (ACCUPRIL) 5 mg tablet TAKE 1 TABLET BY MOUTH EVERYDAY AT BEDTIME    saccharomyces boulardii (FLORASTOR) 250 mg capsule Take 1 capsule (250 mg total) by mouth 2 (two) times a day    traMADol (ULTRAM) 50 mg tablet Take 1 tablet (50 mg total) by mouth every 6 (six) hours as needed for moderate pain    venlafaxine (EFFEXOR) 75 mg tablet Take 75 mg by mouth 3 (three) times a day       No current facility-administered medications on file prior to visit  She is allergic to cephalosporins       Review of Systems   Constitutional: Negative  Negative for fever  HENT: Negative  Eyes: Negative  Respiratory: Negative  Cardiovascular: Negative  Genitourinary: Positive for frequency and urgency  Objective:      /70 (BP Location: Right arm, Patient Position: Sitting, Cuff Size: Standard)   Pulse 80   Temp 98 6 °F (37 °C)   Ht 4' 11" (1 499 m)   Wt 85 7 kg (189 lb)   LMP  (LMP Unknown)   SpO2 98%   BMI 38 17 kg/m²          Physical Exam  Constitutional:       Appearance: Normal appearance  She is obese  HENT:      Head: Normocephalic and atraumatic  Right Ear: Tympanic membrane and ear canal normal       Left Ear: Tympanic membrane and ear canal normal       Nose: Nose normal    Neck:      Musculoskeletal: Normal range of motion and neck supple  Pulmonary:      Effort: Pulmonary effort is normal       Breath sounds: Normal breath sounds  Abdominal:      General: Abdomen is flat  Palpations: Abdomen is soft  Lymphadenopathy:      Cervical: No cervical adenopathy  Neurological:      Mental Status: She is alert

## 2020-08-14 ENCOUNTER — TELEMEDICINE (OUTPATIENT)
Dept: RADIATION ONCOLOGY | Facility: HOSPITAL | Age: 65
End: 2020-08-14
Attending: RADIOLOGY

## 2020-08-14 ENCOUNTER — CLINICAL SUPPORT (OUTPATIENT)
Dept: RADIATION ONCOLOGY | Facility: HOSPITAL | Age: 65
End: 2020-08-14
Attending: STUDENT IN AN ORGANIZED HEALTH CARE EDUCATION/TRAINING PROGRAM

## 2020-08-14 VITALS — HEIGHT: 59 IN | BODY MASS INDEX: 38.17 KG/M2

## 2020-08-14 DIAGNOSIS — C54.1 ENDOMETRIAL CANCER (HCC): Primary | Chronic | ICD-10-CM

## 2020-08-14 DIAGNOSIS — C54.1 ENDOMETRIAL CANCER (HCC): Primary | ICD-10-CM

## 2020-08-14 NOTE — PROGRESS NOTES
Virtual Regular Visit    Problem List Items Addressed This Visit     None               Reason for visit is radiation oncology follow-up    Encounter provider Suzi Aguilar MD    Provider located at [facility] [id number]    Recent Visits  Date Type Provider Dept   08/13/20 Office Visit DO Simon Haynes   Showing recent visits within past 7 days and meeting all other requirements     Today's Visits  Date Type Provider Dept   08/14/20 Abilio Pham MD [facility]   Showing today's visits and meeting all other requirements     Future Appointments  No visits were found meeting these conditions  Showing future appointments within next 150 days and meeting all other requirements        After connecting through [employer], the patient was identified by name and date of birth  Sneha Whittaker was informed that this is a telemedicine visit and that the visit is being conducted through [employer] which may not be secure and therefore, might not be HIPAA-compliant  My office door was closed  No one else was in the room  She acknowledged consent and understanding of privacy and security of the video platform  The patient has agreed to participate and understands they can discontinue the visit at any time  Subjective    Sneha Whittaker is a 60 y o  female Sneha Whittaker is a 60 y o  female with recurrent endometrial carcinoma in the right pelvis  She underwent 6 cycles chemotherapy and IMRT to the pelvis which completed completed on 4/13/20  She was last seen by radiation oncology 5/12/20 5/18/20 CT C/A/P- Small residual ill-defined mass/lymphadenopathy along the right pelvic sidewall, decreased in size from CT dated 1/5/2020  Otherwise, no evidence for metastatic disease in the chest or abdomen         6/8/20 pt to pcp for 4-5 days of left low back pain  Tramadol prescribed    6/30/20 pt to pcp felt PT was aggravating left and right low back pain  Prescribed Flexeril    7/1/20 IR PCNU tube change    7/4/20 Pt to ED with right inguinal pain  CT scan showed diverticulitis  D/c with antibiotic pcp f/u      7/4/20 CT abdomen pelvis- 1  Mild abnormal appearance of the sigmoid colon representing either mild diverticulitis versus mild colitis, either infectious or inflammatory  2   Moderate constipation  3   Right-sided percutaneous nephroureterostomy tube in satisfactory position  7/8/20 Pt to pcp with left lower back and right groin pain  She is now in a wheelchair  Order MRI lumbar spine      7/15/20 MRI lumbar spine-   1  Mild spondylosis  2   Prominent, 2 cm perineural cyst incidentally noted at the right S2-3 neural foramen with associated smooth bony remodeling of the right neural foramen at this level  3  Right renal system percutaneous nephrostomy tube redemonstrated, similar to the recent prior CT       7/30/20 Dr Brock Megan pain medicine- recommend steroid injection to the right piriformis  Asked her to confirm her oncologist is okay with moving forward with the procedure  8/5/20 Dr Dalton De La Torre- The presence or active since of disease is at this time unclear  Patient has new onset pelvic pain and recent CT scan shows some evidence of thickening around rectosigmoid colon/pelvic organs  PET ordered  Obstruction of right ureter, PCNU in place  Plan for anterograde nephrostogram at the time of next exchange in September  If anterograde flow is documented, PCN you could be internalized or removed with short-term imaging/BMP follow-up  DVT, occurred in setting of right pelvic sidewall compression from recurrent endometrial cancer  If determined to have SABAS could consider discontinuation of systemic anticoagulation  8/6/20-8/8/20 patient admitted to the hospital with back pain  CT negative for acute pathology  Treated for UTI   D/c on Tylenol, Robaxin, oxycodone  8/6/20 CTA chest, CT abdomen pelvis- 1  Right-sided nephroureteral stent positioning appears unchanged from the previous study, without evidence of perinephric collection or change in collecting system caliber  Left kidney unremarkable  2  Increased stool in the colon  No bowel obstruction  3  Mild diffuse bladder wall thickening which may be related to inflammation but should be correlated with urinalysis to exclude infection  4  On this mildly motion limited study of the chest, no evidence of pulmonary embolism or acute infiltrate  5   Small pericardial effusion    8/18/20 PET scan  8/26/20 spine and pain procedure  9/4/20 IR PCNU tube change  11/20/20 Dr Pawan Enciso  12/31/20 David Madrid NP    Past Medical History:   Diagnosis Date    Anemia     Chemotherapy follow-up examination     Depression     Endometrial cancer ([facility] [address] ) 12/2017    Hyperglycemia     vx type 2 dm -- last assessed 4/1/14; resolved 11/7/17    Hyperlipidemia     Hypertension     Obesity     last assessed 10/14/17; resolved 11/7/17       Past Surgical History:   Procedure Laterality Date    ABDOMINAL SURGERY      GASTRIC BYPASS    CHOLECYSTECTOMY      at the time of gastric bypass    COLONOSCOPY      CT NEEDLE BIOPSY LYMPH NODE  7/8/2019    FL GUIDED CENTRAL VENOUS ACCESS DEVICE INSERTION  11/12/2019    GASTRIC BYPASS      HYSTERECTOMY Bilateral     total abdominal with salpingo-oophorectomy    IR PICC LINE  9/27/2019    IR PORT PLACEMENT  7/26/2019    IR PORT REMOVAL  9/20/2019    IR TUBE PLACEMENT NEPHROSTOMY  7/26/2019    OOPHORECTOMY Bilateral     AL INSJ TUNNELED CTR VAD W/SUBQ PORT AGE 5 YR/> Left 11/12/2019    Procedure: INSERTION VENOUS PORT ( PORT-A-CATH) IR;  Surgeon: Dilia Pearce DO;  [facility];  Service: Interventional Radiology    AL LAP, RADICAL HYST W/ TUBE&OV, NODE BX N/A 12/19/2017    Procedure: HYSTERECTOMY LAPAROSCOPIC TOTAL ([address]) W/ ROBOTICS; BILATERAL SALPINGOOPHERECTOMY; LYMPH NODE DISSECTION; lysis of adhesions;  Surgeon: Kaylen Dunn MD;  [facility];  Service: Gynecology Oncology    UT LAP,DIAGNOSTIC ABDOMEN N/A 12/19/2017    Procedure: LAPAROSCOPY DIAGNOSTIC;  Surgeon: Kaylen Dunn MD;  [facility];  Service: Gynecology Oncology    TONSILLECTOMY      US GUIDED BREAST BIOPSY RIGHT COMPLETE Right 6/28/2019       Current Outpatient Medications   Medication Sig Dispense Refill    acetaminophen (TYLENOL) 325 mg tablet Take 2 tablets (650 mg total) by mouth every 6 (six) hours as needed for mild pain, headaches or fever 30 tablet 0    ARIPiprazole (ABILIFY) 10 mg tablet Take 10 mg by mouth daily      aspirin (ECOTRIN LOW STRENGTH) 81 mg EC tablet Take 81 mg by mouth daily      Calcium-Magnesium-Vitamin D (CALCIUM 500 PO) Take by mouth daily       cholecalciferol (VITAMIN D3) 1,000 units tablet Take 1,000 Units by mouth daily      Cyanocobalamin (VITAMIN B12 PO) Take by mouth daily       enoxaparin (Lovenox) 120 mg/0 8 mL Inject 0 8 mL (120 mg total) under the skin daily 30 Syringe 3    folic acid (FOLVITE) 1 mg tablet Take 1 tablet (1 mg total) by mouth daily  0    gemfibrozil (LOPID) 600 mg tablet TAKE 1 TABLET BY MOUTH EVERY DAY 90 tablet 1    levofloxacin (LEVAQUIN) 750 mg tablet Take 1 tablet (750 mg total) by mouth every 24 hours for 7 days 7 tablet 0    Multiple Vitamin (MULTIVITAMIN) tablet Take 1 tablet by mouth daily      omeprazole (PriLOSEC) 20 mg delayed release capsule Take 20 mg by mouth daily      oxyCODONE (ROXICODONE) 5 mg immediate release tablet Take 1 tablet (5 mg total) by mouth every 4 (four) hours as needed for moderate painMax Daily Amount: 30 mg 30 tablet 0    quinapril (ACCUPRIL) 5 mg tablet TAKE 1 TABLET BY MOUTH EVERYDAY AT BEDTIME 90 tablet 1    saccharomyces boulardii (FLORASTOR) 250 mg capsule Take 1 capsule (250 mg total) by mouth 2 (two) times a day  0    traMADol (ULTRAM) 50 mg tablet Take 1 tablet (50 mg total) by mouth every 6 (six) hours as needed for moderate pain 60 tablet 0    venlafaxine (EFFEXOR) 75 mg tablet Take 75 mg by mouth 3 (three) times a day         No current facility-administered medications for this visit  Allergies   Allergen Reactions    Cephalosporins Rash     Which Cephalosporin reaction was to not specified; however, has tolerated Amoxicillin, Cefazolin, and Cefepime         I spent 30 minutes with the patient during this visit  Follow up visit       Oncology History   Endometrial cancer ([facility] [address] )   11/17/2017 Initial Diagnosis    Endometrial cancer ([facility] [address] )     11/17/2017 Biopsy    ENDOMETRIAL BIOPSY: WELL DIFFERENTIATED ENDOMETRIAL ADENOCARCINOMA (FIGO I) WITH FOCALMUCINOUS FEATURES  Part B: ENDOCERVICAL POLYP:BENIGN ENDOCERVICAL      12/19/2017 Surgery    Robotic assisted total laparoscopic hysterectomy with bilateral salpingo-oophorectomy and sentinel bilateral pelvic lymph node dissection  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 (4 4 x 3 2 cm tumor, 9 4/15 4mm invasion, NO LVSI, washings revealed atypical cellular changes)     12/19/2017 Genetic Testing    Morrison testing negative     6/28/2019 Biopsy    A  Breast, Right, US BX Right Breast 1000 4 cmfn:  - Benign breast tissue with focal histiocytic aggregate  See comment   - Negative for atypia and in-situ or invasive carcinoma  7/8/2019 Recurrence    Presented with right lower extremity DVT and CT demonstrating right pelvic sidewall mass with venous, ureteral and nerve compression causing significant neuropathic pain  Biopsy:  Lymph Node, Right pelvic lymph node x3:  - High-grade adenocarcinoma  7/30/2019 - 1/6/2020 Chemotherapy    Taxol 175 mg/m2 and carboplatin AUC 6 every 21 days  Dose was reduced to taxol 135 mg/m2 and carboplatin AUC 5  Completed 6 cycles  Treatment protracted due to multiple hospital admissions       2/24/2020 - 4/13/2020 Radiation    adjuvant external beam radiation therapy to the whole pelvis to 4500 cGy followed by boost to gross disease of an additional 2200 cGy         Clinical Trial: no      Health Maintenance   Topic Date Due    Hepatitis C Screening  1955    DXA SCAN  1955    Pneumococcal Vaccine: Pediatrics (0 to 5 Years) and At-Risk Patients (6 to 59 Years) (1 of 3 - PCV13) 09/05/1963    HIV Screening  09/05/1972    DTaP,Tdap,and Td Vaccines (1 - Tdap) 09/05/1978    Annual Physical  04/04/2020    Influenza Vaccine  07/01/2020    MAMMOGRAM  06/24/2020    BMI: Followup Plan  01/02/2021    BMI: Adult  08/13/2021    Colorectal Cancer Screening  10/14/2027    HIB Vaccine  Aged Out    Hepatitis B Vaccine  Aged Out    IPV Vaccine  Aged Out    Hepatitis A Vaccine  Aged Out    Meningococcal ACWY Vaccine  Aged Out    HPV Vaccine  Aged Out       Patient Active Problem List   Diagnosis    Benign essential hypertension    Major depression, chronic    Dyslipidemia    Endometrial cancer ([facility] [address] )    Class 3 severe obesity with body mass index (BMI) of 45 0 to 49 9 in adult [facility])    Acute deep vein thrombosis (DVT) of proximal vein of lower extremity ([address] )    Breast cancer screening    Encounter for central line care    Cancer related pain    Chemotherapy induced neutropenia ([address] )    Anemia    Dehydration    Chemotherapy-induced nausea    Hypokalemia    Hyponatremia    Generalized weakness    Bilateral lower extremity edema    Moderate protein-calorie malnutrition ([address] )    Ambulatory dysfunction    Hypomagnesemia    Obstruction of right ureter    Bacteremia    UTI (urinary tract infection)    Overactive bladder    Acute left-sided low back pain without sciatica     Past Medical History:   Diagnosis Date    Anemia     Chemotherapy follow-up examination     Depression     Endometrial cancer ([facility] [address] ) 12/2017    Hyperglycemia     vx type 2 dm -- last assessed 4/1/14; resolved 11/7/17    Hyperlipidemia     Hypertension  Obesity     last assessed 10/14/17; resolved 11/7/17     Past Surgical History:   Procedure Laterality Date    ABDOMINAL SURGERY      GASTRIC BYPASS    CHOLECYSTECTOMY      at the time of gastric bypass    COLONOSCOPY      CT NEEDLE BIOPSY LYMPH NODE  7/8/2019    FL GUIDED CENTRAL VENOUS ACCESS DEVICE INSERTION  11/12/2019    GASTRIC BYPASS      HYSTERECTOMY Bilateral     total abdominal with salpingo-oophorectomy    IR PICC LINE  9/27/2019    IR PORT PLACEMENT  7/26/2019    IR PORT REMOVAL  9/20/2019    IR TUBE PLACEMENT NEPHROSTOMY  7/26/2019    OOPHORECTOMY Bilateral     ME INSJ TUNNELED CTR VAD W/SUBQ PORT AGE 5 YR/> Left 11/12/2019    Procedure: INSERTION VENOUS PORT ( PORT-A-CATH) IR;  Surgeon: Zay Blake DO;  [facility];  Service: Interventional Radiology    ME LAP, RADICAL HYST W/ TUBE&OV, NODE BX N/A 12/19/2017    Procedure: HYSTERECTOMY [address]AROSCOPIC TOTAL ([address]) W/ ROBOTICS; BILATERAL SALPINGOOPHERECTOMY; LYMPH NODE DISSECTION; lysis of adhesions;  Surgeon: Luana Villegas MD;  [facility];  Service: Gynecology Oncology    ME LAP,DIAGNOSTIC ABDOMEN N/A 12/19/2017    Procedure: LAPAROSCOPY DIAGNOSTIC;  Surgeon: Luana Villegas MD;  [facility];  Service: Gynecology Oncology    TONSILLECTOMY      US GUIDED BREAST BIOPSY RIGHT COMPLETE Right 6/28/2019     Family History   Problem Relation Age of Onset    Other Mother         dyslipidemia    Ovarian cancer Mother 48    Lymphoma Father     Bone cancer Maternal Grandfather     No Known Problems Sister     No Known Problems Maternal Grandmother     Uterine cancer Paternal Grandmother     No Known Problems Paternal Grandfather     No Known Problems Maternal Aunt     No Known Problems Maternal Aunt     No Known Problems Maternal Aunt     No Known Problems Maternal Aunt     No Known Problems Paternal Aunt     No Known Problems Paternal Aunt     No Known Problems Paternal Aunt     No Known Problems Paternal Aunt     No Known Problems Brother     No Known Problems Brother      Social History     Socioeconomic History    Marital status: Single     Spouse name: Not on file    Number of children: Not on file    Years of education: Not on file    Highest education level: Not on file   Occupational History    Not on file   Social Needs    Financial resource strain: Not on file    Food insecurity     Worry: Not on file     Inability: Not on file   [employer] needs     Medical: Not on file     Non-medical: Not on file   Tobacco Use    Smoking status: Never Smoker    Smokeless tobacco: Never Used   Substance and Sexual Activity    Alcohol use: Not Currently    Drug use: No    Sexual activity: Not Currently   Lifestyle    Physical activity     Days per week: Not on file     Minutes per session: Not on file    Stress: Not on file   Relationships    Social connections     Talks on phone: Not on file     Gets together: Not on file     Attends Faith service: Not on file     Active member of club or organization: Not on file     Attends meetings of clubs or organizations: Not on file     Relationship status: Not on file    Intimate partner violence     Fear of current or ex partner: Not on file     Emotionally abused: Not on file     Physically abused: Not on file     Forced sexual activity: Not on file   Other Topics Concern    Not on file   Social History Narrative    Not on file       Current Outpatient Medications:     acetaminophen (TYLENOL) 325 mg tablet, Take 2 tablets (650 mg total) by mouth every 6 (six) hours as needed for mild pain, headaches or fever, Disp: 30 tablet, Rfl: 0    ARIPiprazole (ABILIFY) 10 mg tablet, Take 10 mg by mouth daily, Disp: , Rfl:     aspirin (ECOTRIN LOW STRENGTH) 81 mg EC tablet, Take 81 mg by mouth daily, Disp: , Rfl:     Calcium-Magnesium-Vitamin D (CALCIUM 500 PO), Take by mouth daily , Disp: , Rfl:     cholecalciferol (VITAMIN D3) 1,000 units tablet, Take 1,000 Units by mouth daily, Disp: , Rfl:     Cyanocobalamin (VITAMIN B12 PO), Take by mouth daily , Disp: , Rfl:     enoxaparin (Lovenox) 120 mg/0 8 mL, Inject 0 8 mL (120 mg total) under the skin daily, Disp: 30 Syringe, Rfl: 3    folic acid (FOLVITE) 1 mg tablet, Take 1 tablet (1 mg total) by mouth daily, Disp: , Rfl: 0    gemfibrozil (LOPID) 600 mg tablet, TAKE 1 TABLET BY MOUTH EVERY DAY, Disp: 90 tablet, Rfl: 1    levofloxacin (LEVAQUIN) 750 mg tablet, Take 1 tablet (750 mg total) by mouth every 24 hours for 7 days, Disp: 7 tablet, Rfl: 0    Multiple Vitamin (MULTIVITAMIN) tablet, Take 1 tablet by mouth daily, Disp: , Rfl:     omeprazole (PriLOSEC) 20 mg delayed release capsule, Take 20 mg by mouth daily, Disp: , Rfl:     oxyCODONE (ROXICODONE) 5 mg immediate release tablet, Take 1 tablet (5 mg total) by mouth every 4 (four) hours as needed for moderate painMax Daily Amount: 30 mg, Disp: 30 tablet, Rfl: 0    quinapril (ACCUPRIL) 5 mg tablet, TAKE 1 TABLET BY MOUTH EVERYDAY AT BEDTIME, Disp: 90 tablet, Rfl: 1    saccharomyces boulardii (FLORASTOR) 250 mg capsule, Take 1 capsule (250 mg total) by mouth 2 (two) times a day, Disp: , Rfl: 0    traMADol (ULTRAM) 50 mg tablet, Take 1 tablet (50 mg total) by mouth every 6 (six) hours as needed for moderate pain, Disp: 60 tablet, Rfl: 0    venlafaxine (EFFEXOR) 75 mg tablet, Take 75 mg by mouth 3 (three) times a day  , Disp: , Rfl:   Allergies   Allergen Reactions    Cephalosporins Rash     Which Cephalosporin reaction was to not specified; however, has tolerated Amoxicillin, Cefazolin, and Cefepime       Review of Systems:  Review of Systems   Constitutional: Negative  HENT: Negative  Eyes: Negative  Respiratory: Negative  Cardiovascular: Negative  Gastrointestinal: Negative  Endocrine: Negative  Genitourinary: Negative  Nephrostome tube   Musculoskeletal: Negative           Left flank pain, reports back pain improved   Skin: Negative  Neurological: Positive for numbness (right foot neuropathy)  Hematological: Bruises/bleeds easily  Psychiatric/Behavioral: Negative  Vitals:    08/14/20 1257   Height: 4' 11" (1 499 m)        Pain Score:   3      Imaging:Pe Study With Ct Abdomen And Pelvis With Contrast    Addendum Date: 8/7/2020 Addendum:   ADDENDUM: With respect to the retroperitoneal soft tissue stranding along the right upper lateral pelvic sidewall: ill-defined soft tissue density is noted insinuated between the distal right ureteral stent and the bony sidewall for example on image 3/64  An approximation of size is 1 9 x 1 1 cm  PET/CT would be helpful to determine if there is viable tumor associated with this density  This was discussed with Andre De Luna on 8/7/2020, 2:30 PM     Result Date: 8/7/2020  Narrative: CT PULMONARY ANGIOGRAM OF THE CHEST AND CT ABDOMEN AND PELVIS WITH INTRAVENOUS CONTRAST INDICATION:   Right-sided flank pain  History of right nephrostomy    COMPARISON:  7/4/2020 TECHNIQUE:  CT examination of the chest, abdomen and pelvis was performed  Thin section CT angiographic technique was used in the chest in order to evaluate for pulmonary embolus and coronal 3D MIP postprocessing was performed on the acquisition scanner  Axial, sagittal, and coronal 2D reformatted images were created from the source data and submitted for interpretation  Radiation dose length product (DLP) for this visit:  990 mGy-cm   This examination, like all CT scans performed in the [facility], was performed utilizing techniques to minimize radiation dose exposure, including the use of iterative reconstruction and automated exposure control  IV Contrast:  100 mL of iohexol (OMNIPAQUE) Enteric Contrast:  Enteric contrast was not administered   FINDINGS: CHEST PULMONARY ARTERIAL TREE:  Mild motion artifacts sensitivity in detection of small or peripheral pulmonary emboli with no evidence of more central embolic disease LUNGS:  Mild motion artifact reduces sensitivity for detection of small pulmonary nodules  There is no evidence of acute airspace consolidation  PLEURA:  Unremarkable  HEART/AORTA:  Small pericardial effusion  MEDIASTINUM AND KATIANA:  Unremarkable  CHEST WALL AND LOWER NECK:   Unremarkable  ABDOMEN LIVER/BILIARY TREE:  Unremarkable  GALLBLADDER:  Patient is status post cholecystectomy  Prominent intra and extrahepatic biliary tract noted, but unchanged from previous examination and frequently seen following cholecystectomy  SPLEEN:  Unremarkable  PANCREAS:  Unremarkable  ADRENAL GLANDS:  Unremarkable  KIDNEYS/URETERS:  Left kidney is unremarkable  Right kidney has a nephroureteral stent  Right renal cyst unchanged  Right kidney collecting system diameter unchanged from prior  STOMACH AND BOWEL:  Increased stool within the colon  No evidence of small bowel obstruction  APPENDIX:  No findings to suggest appendicitis  ABDOMINOPELVIC CAVITY:  No progressive adenopathy, no free fluid or free air  Soft tissue stranding in the inferior right retroperitoneum unchanged from prior VESSELS:  Unremarkable for patient's age  PELVIS REPRODUCTIVE ORGANS:  Uterus not seen URINARY BLADDER:  Contains the distal portion of the stent  Mild generalized bladder wall thickening ABDOMINAL WALL/INGUINAL REGIONS:  Unremarkable  OSSEOUS STRUCTURES:  No acute fracture or destructive osseous lesion  Impression: 1  Right-sided nephroureteral stent positioning appears unchanged from the previous study, without evidence of perinephric collection or change in collecting system caliber  Left kidney unremarkable 2  Increased stool in the colon  No bowel obstruction  3  Mild diffuse bladder wall thickening which may be related to inflammation but should be correlated with urinalysis to exclude infection 4  On this mildly motion limited study of the chest, no evidence of pulmonary embolism or acute infiltrate 5  Small pericardial effusion Workstation performed: [id number]

## 2020-08-14 NOTE — PROGRESS NOTES
Virtual Regular Visit      Assessment/Plan:  Recurrent endometrial carcinoma presented with right pelvic side wall mass/adenopathy was treated with 6 cycles of chemotherapy and IMRT which she completed 4 months ago  She had CT of the abdomen pelvis July 4 due to abdominal pains which showed mild diverticulitis in the sigmoid colon  She had MRI lumbar spine did not offer any diagnosis as to origin of her pain  She has seen pain management recommend steroid injection to the right  piriformis  She was recently admitted for the same back pain CT scan did not reveal any abnormality to explain her pain  She is scheduled for PET-CT scan next week  The right pelvic sidewall disease decrease in size with a follow-up CT scan in May  We await results of the PET-CT scan and arranged follow-up schedule  Problem List Items Addressed This Visit     None               Reason for visit is   Chief Complaint   Patient presents with    Follow-up     radiation oncology        Encounter provider Thad Bess MD    Provider located at [place] [address]      Recent Visits  Date Type Provider Dept   [facility]   Showing recent visits within past 7 days and meeting all other requirements     Today's Visits  Date Type Provider Dept   08/14/20 Miroslava Ly MD [facility]   Showing today's visits and meeting all other requirements     Future Appointments  No visits were found meeting these conditions  Showing future appointments within next 150 days and meeting all other requirements        The patient was identified by name and date of birth  Nathan Laureano was informed that this is a telemedicine visit and that the visit is being conducted through [employer] and patient was informed that this is not a secure, HIPAA-complaint platform  She agrees to proceed     My office door was closed  No one else was in the room  She acknowledged consent and understanding of privacy and security of the video platform  The patient has agreed to participate and understands they can discontinue the visit at any time  Patient is aware this is a billable service  Kavon Estrada is a 60 y o  female con endometrial carcinoma was treated with 6 cycles of chemotherapy and IMRT to grow was disease in the right pelvic sidewall  The follow-up CT scan in May showed decrease in the tumor size  She has back pains uncertain was related to more disease in the abdomen and so PET-CT scan is scheduled next week  Dinorah Dela Cruz       HPI     Past Medical History:   Diagnosis Date    Anemia     Chemotherapy follow-up examination     Depression     Endometrial cancer ([facility] [address] ) 12/2017    Hyperglycemia     vx type 2 dm -- last assessed 4/1/14; resolved 11/7/17    Hyperlipidemia     Hypertension     Obesity     last assessed 10/14/17; resolved 11/7/17       Past Surgical History:   Procedure Laterality Date    ABDOMINAL SURGERY      GASTRIC BYPASS    CHOLECYSTECTOMY      at the time of gastric bypass    COLONOSCOPY      CT NEEDLE BIOPSY LYMPH NODE  7/8/2019    FL GUIDED CENTRAL VENOUS ACCESS DEVICE INSERTION  11/12/2019    GASTRIC BYPASS      HYSTERECTOMY Bilateral     total abdominal with salpingo-oophorectomy    IR PICC LINE  9/27/2019    IR PORT PLACEMENT  7/26/2019    IR PORT REMOVAL  9/20/2019    IR TUBE PLACEMENT NEPHROSTOMY  7/26/2019    OOPHORECTOMY Bilateral     AZ INSJ TUNNELED CTR VAD W/SUBQ PORT AGE 5 YR/> Left 11/12/2019    Procedure: INSERTION VENOUS PORT ( PORT-A-CATH) IR;  Surgeon: Juana Gagnon DO;  [facility];  Service: Interventional Radiology    AZ LAP, RADICAL HYST W/ TUBE&OV, NODE BX N/A 12/19/2017    Procedure: HYSTERECTOMY LAPAROSCOPIC TOTAL ([address] [place] [address]) W/ ROBOTICS; BILATERAL SALPINGOOPHERECTOMY; LYMPH NODE DISSECTION; lysis of adhesions;  Surgeon: Alyssa Winters MD;  [facility];  Service: Gynecology Oncology    AL LAP,DIAGNOSTIC ABDOMEN N/A 12/19/2017    Procedure: LAPAROSCOPY DIAGNOSTIC;  Surgeon: Alyssa Winters MD;  [facility];  Service: Gynecology Oncology    TONSILLECTOMY      US GUIDED BREAST BIOPSY RIGHT COMPLETE Right 6/28/2019       Current Outpatient Medications   Medication Sig Dispense Refill    acetaminophen (TYLENOL) 325 mg tablet Take 2 tablets (650 mg total) by mouth every 6 (six) hours as needed for mild pain, headaches or fever 30 tablet 0    ARIPiprazole (ABILIFY) 10 mg tablet Take 10 mg by mouth daily      aspirin (ECOTRIN LOW STRENGTH) 81 mg EC tablet Take 81 mg by mouth daily      Calcium-Magnesium-Vitamin D (CALCIUM 500 PO) Take by mouth daily       cholecalciferol (VITAMIN D3) 1,000 units tablet Take 1,000 Units by mouth daily      Cyanocobalamin (VITAMIN B12 PO) Take by mouth daily       enoxaparin (Lovenox) 120 mg/0 8 mL Inject 0 8 mL (120 mg total) under the skin daily 30 Syringe 3    folic acid (FOLVITE) 1 mg tablet Take 1 tablet (1 mg total) by mouth daily  0    gemfibrozil (LOPID) 600 mg tablet TAKE 1 TABLET BY MOUTH EVERY DAY 90 tablet 1    levofloxacin (LEVAQUIN) 750 mg tablet Take 1 tablet (750 mg total) by mouth every 24 hours for 7 days 7 tablet 0    Multiple Vitamin (MULTIVITAMIN) tablet Take 1 tablet by mouth daily      omeprazole (PriLOSEC) 20 mg delayed release capsule Take 20 mg by mouth daily      oxyCODONE (ROXICODONE) 5 mg immediate release tablet Take 1 tablet (5 mg total) by mouth every 4 (four) hours as needed for moderate painMax Daily Amount: 30 mg 30 tablet 0    quinapril (ACCUPRIL) 5 mg tablet TAKE 1 TABLET BY MOUTH EVERYDAY AT BEDTIME 90 tablet 1    saccharomyces boulardii (FLORASTOR) 250 mg capsule Take 1 capsule (250 mg total) by mouth 2 (two) times a day  0    traMADol (ULTRAM) 50 mg tablet Take 1 tablet (50 mg total) by mouth every 6 (six) hours as needed for moderate pain 60 tablet 0    venlafaxine (EFFEXOR) 75 mg tablet Take 75 mg by mouth 3 (three) times a day         No current facility-administered medications for this visit  Allergies   Allergen Reactions    Cephalosporins Rash     Which Cephalosporin reaction was to not specified; however, has tolerated Amoxicillin, Cefazolin, and Cefepime       Review of Systems    Video Exam    Vitals:    08/14/20 1257   Height: 4' 11" (1 499 m)       Physical Exam not applicable  I spent 15 minutes directly with the patient during this visit      VIRTUAL VISIT DISCLAIMER    Tonya Wagoner acknowledges that she has consented to an online visit or consultation  She understands that the online visit is based solely on information provided by her, and that, in the absence of a face-to-face physical evaluation by the physician, the diagnosis she receives is both limited and provisional in terms of accuracy and completeness  This is not intended to replace a full medical face-to-face evaluation by the physician  Mp Radford understands and accepts these terms

## 2020-08-18 ENCOUNTER — APPOINTMENT (OUTPATIENT)
Dept: LAB | Age: 65
End: 2020-08-18
Payer: COMMERCIAL

## 2020-08-18 ENCOUNTER — HOSPITAL ENCOUNTER (OUTPATIENT)
Dept: RADIOLOGY | Age: 65
Discharge: HOME | End: 2020-08-18
Payer: COMMERCIAL

## 2020-08-18 DIAGNOSIS — C54.1 ENDOMETRIAL CANCER (HCC): ICD-10-CM

## 2020-08-18 LAB
BACTERIA UR QL AUTO: ABNORMAL /HPF
BILIRUB UR QL STRIP: NEGATIVE
CLARITY UR: CLEAR
COLOR UR: YELLOW
GLUCOSE SERPL-MCNC: 97 MG/DL (ref 65–140)
GLUCOSE UR STRIP-MCNC: NEGATIVE MG/DL
HGB UR QL STRIP.AUTO: ABNORMAL
HYALINE CASTS #/AREA URNS LPF: ABNORMAL /LPF
KETONES UR STRIP-MCNC: NEGATIVE MG/DL
LEUKOCYTE ESTERASE UR QL STRIP: ABNORMAL
NITRITE UR QL STRIP: NEGATIVE
NON-SQ EPI CELLS URNS QL MICRO: ABNORMAL /HPF
PH UR STRIP.AUTO: 6 [PH]
PROT UR STRIP-MCNC: NEGATIVE MG/DL
RBC #/AREA URNS AUTO: ABNORMAL /HPF
SP GR UR STRIP.AUTO: 1.01 (ref 1–1.03)
UROBILINOGEN UR QL STRIP.AUTO: 0.2 E.U./DL
WBC #/AREA URNS AUTO: ABNORMAL /HPF

## 2020-08-18 PROCEDURE — G1004 CDSM NDSC: HCPCS

## 2020-08-18 PROCEDURE — 78815 PET IMAGE W/CT SKULL-THIGH: CPT

## 2020-08-18 PROCEDURE — 81001 URINALYSIS AUTO W/SCOPE: CPT | Performed by: INTERNAL MEDICINE

## 2020-08-18 PROCEDURE — 82948 REAGENT STRIP/BLOOD GLUCOSE: CPT

## 2020-08-18 PROCEDURE — A9552 F18 FDG: HCPCS

## 2020-08-21 ENCOUNTER — DOCUMENTATION (OUTPATIENT)
Dept: GYNECOLOGIC ONCOLOGY | Facility: CLINIC | Age: 65
End: 2020-08-21

## 2020-08-21 NOTE — PROGRESS NOTES
Case was presented at the multidisciplinary Gynecologic Tumor Conference on 8/21/2020 and the consensus recommendation was: initiation of systemic chemotherapy

## 2020-08-26 ENCOUNTER — HOSPITAL ENCOUNTER (OUTPATIENT)
Dept: RADIOLOGY | Facility: CLINIC | Age: 65
Discharge: HOME | End: 2020-08-26
Attending: PHYSICAL MEDICINE & REHABILITATION

## 2020-08-26 ENCOUNTER — TELEPHONE (OUTPATIENT)
Dept: GYNECOLOGIC ONCOLOGY | Facility: CLINIC | Age: 65
End: 2020-08-26

## 2020-08-26 VITALS
SYSTOLIC BLOOD PRESSURE: 77 MMHG | DIASTOLIC BLOOD PRESSURE: 43 MMHG | HEART RATE: 75 BPM | RESPIRATION RATE: 18 BRPM | TEMPERATURE: 98.2 F | OXYGEN SATURATION: 97 %

## 2020-08-26 DIAGNOSIS — G57.01 PIRIFORMIS SYNDROME OF RIGHT SIDE: ICD-10-CM

## 2020-08-26 DIAGNOSIS — R94.8 ABNORMAL PET SCAN OF RETROPERITONEUM: ICD-10-CM

## 2020-08-26 DIAGNOSIS — C54.1 ENDOMETRIAL CANCER (HCC): Primary | ICD-10-CM

## 2020-08-26 DIAGNOSIS — M54.50 ACUTE BILATERAL LOW BACK PAIN WITHOUT SCIATICA: ICD-10-CM

## 2020-08-26 NOTE — DISCHARGE INSTR - LAB
1  Do not apply heat to any area that is numb  If you have discomfort or soreness at the injection site, you may apply ice today, 20 minutes on and 20 minutes off  Tomorrow you may use ice or warm, moist heat  Do not apply ice or heat directly to the skin  2  If you experience severe shortness of breath, go to the Emergency Room  3  You may have numbness for several hours from the local anesthetic  Please use caution and common sense, especially with weight-bearing activities  4  You may have an increase or change in the discomfort for 36-48 hours after your treatment  Apply ice and continue with any pain medicine you have been prescribed  5  Do not do anything strenuous today  You may shower, but no tub baths or hot tubs today  You may resume your normal activities tomorrow, but do not overdo it  Resume normal activities slowly when you are feeling better  6  If you experience redness, drainage or swelling at the injection site, or if you develop a fever above 100 degrees, please call [facility] at [phone] or go to the Emergency Room  7  Continue to take all routine medicines prescribed by your primary care physician unless otherwise instructed by our staff  Most blood thinners should be started again according to your regularly scheduled dosing  If you have any questions, please give our office a call  If you have a problem specifically related to your procedure, please call our office at [phone]  Problems not related to your procedure should be directed to your primary care physician

## 2020-08-26 NOTE — TELEPHONE ENCOUNTER
Reviewed with patient PET results  She is not symptomatic from pubic fracture at this time  Right pelvic retroperitoneal disease has improved but there is still suspicion for residual disease  Plan to reassess in 3 months with repeat PET/CT  Patient is in agreement with plan

## 2020-09-04 ENCOUNTER — HOSPITAL ENCOUNTER (OUTPATIENT)
Dept: RADIOLOGY | Facility: HOSPITAL | Age: 65
Discharge: HOME | End: 2020-09-04
Attending: UROLOGY | Admitting: RADIOLOGY
Payer: COMMERCIAL

## 2020-09-04 DIAGNOSIS — N13.30 HYDRONEPHROSIS: ICD-10-CM

## 2020-09-04 PROCEDURE — C1769 GUIDE WIRE: HCPCS

## 2020-09-04 PROCEDURE — C1894 INTRO/SHEATH, NON-LASER: HCPCS

## 2020-09-04 PROCEDURE — 50387 CHANGE NEPHROURETERAL CATH: CPT

## 2020-09-04 PROCEDURE — 50387 CHANGE NEPHROURETERAL CATH: CPT | Performed by: RADIOLOGY

## 2020-09-04 RX ORDER — SODIUM CHLORIDE 9 MG/ML
10 INJECTION INTRAVENOUS DAILY
Qty: 300 ML | Refills: 3 | Status: SHIPPED | OUTPATIENT
Start: 2020-09-04 | End: 2021-07-16

## 2020-09-04 RX ADMIN — IOHEXOL 15 ML: 350 INJECTION, SOLUTION INTRAVENOUS at 10:23

## 2020-09-04 NOTE — BRIEF OP NOTE (RAD/CATH)
INTERVENTIONAL RADIOLOGY PROCEDURE NOTE    Date: 9/4/2020    Procedure: IR NEPHROURETERAL STENT CHECK/CHANGE/REPOSITION    Preoperative diagnosis:   1  Hydronephrosis         Postoperative diagnosis: Same  Surgeon: Rossana Ochoa MD     Assistant: None  No qualified resident was available  Blood loss: 0    Specimens: 0     Findings:   Minimal forward  flow in the right ureter    Not a good candidate for removal of tube  Placement of new 24 cm 10 [place] PCNU    I left it capped  Complications: None immediate      Anesthesia: Local

## 2020-09-04 NOTE — DISCHARGE INSTRUCTIONS
Nephrostomy Tube Care     WHAT YOU NEED TO KNOW:   A nephrostomy tube is a catheter (thin plastic tube) that is inserted through your skin and into your kidney  The nephrostomy tube drains urine from your kidney into a collecting bag outside your body  You may need a nephrostomy tube when something is blocking the normal flow of urine  A nephrostomy tube may be used for a short or a long period of time  The nephrostomy tube comes out of your back, so you will need someone to help care for your nephrostomy tube  DISCHARGE INSTRUCTIONS:     Resume your normal diet  Small sips of flat soda will help with mild nausea  How to clean the skin around the nephrostomy tube and change the bandage:  Since the nephrostomy tube comes out of your back, you will not be able to care for it by yourself  Ask someone to follow the general directions below to check and care for your nephrostomy tube  Gather the items you will need  Disposable (single use) under-pad, and a clean washcloth  ¨ Plain soap, warm water, and new medical gloves  ¨ Sterile gauze bandages  ¨ Clear adhesive dressing or medical tape  ¨ Skin barrier  ¨ Protective skin film  ¨ Trash bag  · Remove the old bandage, and check the tube entry site  ¨ Have the patient lie on his side with the nephrostomy tube entry site facing up  Place the under-pad where it will catch drainage as you are working with the nephrostomy tube  ¨ Wash your hands with soap and water  Put on new medical gloves  ¨ Gently remove the old bandage, without pulling on the tube  Do this by holding the skin beside the tube with one hand  With the other hand, gently remove sticky tape and the skin barrier by pulling in the same direction as hair growth  Do not touch the side of the bandage that is placed over or around the tube  Throw the bandage and skin barrier away in a trash bag  ¨ Look for signs of infection, such as skin redness and swelling   Report any skin changes to healthcare providers  ¨ Clean the tube entry site  ¨ Hold the tube in place to keep it from being pulled out while you are cleaning around it  ¨ You will need to clean the area twice  For the first cleaning, wet a new gauze bandage with soap and water  Begin at the entry site of the tube  Wipe the skin in circles, moving away from the entry site  Remove blood and any other material with the gauze  Do this as often as needed  Use a new gauze bandage each time you clean the area, moving away from the entry site  ¨ For the second cleaning, wet a new gauze bandage with water  Begin at the entry site of the tube  Wipe the skin in circles, moving away from the entry site  Use a new gauze bandage each time you clean the area, moving away from the entry site  ¨ Gently pat the skin with a clean washcloth to dry it  · Apply the skin barrier and bandages  ¨ Roll up a bandage to make it thick, and place it under  the place where the tube enters the skin  Place it to support the tube, and stop it from kinking or bending  Tape the bandage in place, and apply more bandages if directed by a healthcare provider  ¨ Bring the tubing forward to the front and tape it to the skin  Do not stretch the tube tight, because this may pull the nephrostomy tube out  How often to change the bandage  Change the bandage around the tube, every other day  If your bandages  get dirty or wet, change them right away, and as often as needed  If your nephrostomy tube is to be used for a long period of time, the tube needs to be changed every 2 to 3 months  Healthcare providers will tell you when you need to make an appointment to have your tube changed  How to care for the urine drainage bag:   · Ask if you need to measure and write down how much urine is in the bag before you empty it  Drain urine out of the drainage bag when it is ½ to ? full  Open the spout at the bottom of the bag to empty the urine into the toilet     · You may need to detach the drainage bag from the nephrostomy tube to change it    If so, attach a new drainage bag tightly to the nephrostomy tube  ·   How to prevent problems with your nephrostomy tube:   · Change bandages, directed  This helps to prevent infection  Throw away or clean your drainage bag as directed by your healthcare provider  · Wipe the connecting ends of the drainage bag with alcohol before you reconnect the bag to the tube  This helps prevent infection  Keep the tube taped to your skin and connected to a drainage bag placed below the level of your kidneys  This helps prevent urine from backing up into your kidneys  You may wear a small drainage bag strapped to your leg to let you move around more easily  · Check the catheter to be sure it is in place after you change your clothes or do other activities  Do not wear tight clothing over the tube  Place the tubing over your thigh rather than under it when you are sitting down  Be sure that nothing is pulling on the nephrostomy tube when you move around  · Change positions if you see little or no urine in your drainage bag  Check to see if the urine tube is twisted or bent  Be sure that you are not sitting or lying on the tube  If there are no kinks and there is little or no urine in the drainage bag, tell your healthcare provider  · Flush out the tube as directed  Some tubes get flushed one time a day with 10 mls of NSS You will be given a prescription for the flushes  To flush the nephrostomy tube, clean both connections with alcohol swap  Twist off the drainage bag tube and twist the saline syringe into the nephrostomy tube and flush briskly  Remove the syringe and twist the drainage bag tube back into the nephrostomy tube  · Keep the site covered while you shower  Tape a piece of clear adhesive plastic over the dressing to keep it dry while you shower  Do not take tub baths      Contact Interventional Radiology at [phone] Angelique PATIENTS: Contact Interventional Radiology at [phone]) Meron Jackson PATIENTS: Contact Interventional Radiology at [phone]) if:  · The skin around the nephrostomy tube is red, swollen, itches, or has a rash  · You have a fever  · You have lower back or hip pain  · There are changes in how your urine looks or smells  · You have little or no urine draining from the nephrostomy tube  · You have nausea and are vomiting  · The black bernardo on your tube has moved, or the tube is longer than when it was put in    · You have questions or concerns about your condition or care  · The nephrostomy tube comes out completely  · There is blood, pus, or a bad smell coming from the place where the tube enters your skin  · Urine is leaking around the tube  Tube Capping Trial        If your tube is capped and has no drainage bag, the urine will flow through the ureter         and into the bladder for you to urinate normally  This is called a capping trial                    Continue to flush your tube one time per day  You will have an extra drainage bag to       keep at home in case the capping trial fails  Call the IR department if you experience        any of the following: Pain, fever greater than 101  Persistent nausea or vomiting  The following pharmacies carry the flush syringes  [facility]                     [facility]       [address]  Phone [phone]            Phone [phone] Lauren Patel                                 [phone]  Phone [phone]            Phone [phone]    [facility]                                                                         [facility] [phone]  [address]   Phone [phone]

## 2020-09-12 DIAGNOSIS — I82.4Y9 ACUTE DEEP VEIN THROMBOSIS (DVT) OF PROXIMAL VEIN OF LOWER EXTREMITY, UNSPECIFIED LATERALITY (HCC): ICD-10-CM

## 2020-09-14 NOTE — PROGRESS NOTES
Outpatient Oncology Nutrition Consult  Type of Consult: Follow Up  Care Location: Telephone Call   Nutrition Assessment:  PMH: anemia, depression, DM, HLD, HTN, obesity, RNYGB (2001 at [facility])  Oncology Diagnosis & Treatments:   Endometrial cancer  -S/p surgery 12/19/17      -Recurrence 7/8/19      -S/p chemotherapy (carboplatin/taxol from 7/30/19-1/6/20)  -S/p whole pelvis RT (2/4/20- 4/13/20)  Oncology History   Endometrial cancer ([facility] )   11/17/2017 Initial Diagnosis    Endometrial cancer ([facility] [address] )     11/17/2017 Biopsy    ENDOMETRIAL BIOPSY: WELL DIFFERENTIATED ENDOMETRIAL ADENOCARCINOMA (FIGO I) WITH FOCALMUCINOUS FEATURES  Part B: ENDOCERVICAL POLYP:BENIGN ENDOCERVICAL      12/19/2017 Surgery    Robotic assisted total laparoscopic hysterectomy with bilateral salpingo-oophorectomy and sentinel bilateral pelvic lymph node dissection  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 (4 4 x 3 2 cm tumor, 9 4/15 4mm invasion, NO LVSI, washings revealed atypical cellular changes)     12/19/2017 Genetic Testing    Morrison testing negative     6/28/2019 Biopsy    A  Breast, Right, US BX Right Breast 1000 4 cmfn:  - Benign breast tissue with focal histiocytic aggregate  See comment   - Negative for atypia and in-situ or invasive carcinoma  7/8/2019 Recurrence    Presented with right lower extremity DVT and CT demonstrating right pelvic sidewall mass with venous, ureteral and nerve compression causing significant neuropathic pain  Biopsy:  Lymph Node, Right pelvic lymph node x3:  - High-grade adenocarcinoma  7/30/2019 - 1/6/2020 Chemotherapy    Taxol 175 mg/m2 and carboplatin AUC 6 every 21 days  Dose was reduced to taxol 135 mg/m2 and carboplatin AUC 5  Completed 6 cycles  Treatment protracted due to multiple hospital admissions       2/24/2020 - 4/13/2020 Radiation    adjuvant external beam radiation therapy to the whole pelvis to 4500 cGy followed by boost to gross disease of an additional 2200 cGy       Past Medical History:   Diagnosis Date    Anemia     Chemotherapy follow-up examination     Depression     Endomet[facility] ) 12/2017    Hyperglycemia     vx type 2 dm -- last assessed 4/1/14; resolved 11/7/17    Hyperlipidemia     Hypertension     Obesity     last assessed 10/14/17; resolved 11/7/17     Past Surgical History:   Procedure Laterality Date    ABDOMINAL SURGERY      GASTRIC BYPASS    CHOLECYSTECTOMY      at the time of gastric bypass    COLONOSCOPY      CT NEEDLE BIOPSY LYMPH NODE  7/8/2019    FL GUIDED CENTRAL VENOUS ACCESS DEVICE INSERTION  11/12/2019    GASTRIC BYPASS      HYSTERECTOMY Bilateral     total abdominal with salpingo-oophorectomy    IR NEPHROSTOMY TUBE PLACEMENT  7/26/2019    IR PICC LINE PLACEMENT DOUBLE LUMEN  9/27/2019    IR PORT PLACEMENT  7/26/2019    IR PORT REMOVAL  9/20/2019    OOPHORECTOMY Bilateral     OK INSJ TUNNELED CTR VAD W/SUBQ PORT AGE 5 YR/> Left 11/12/2019    Procedure: INSERTION VENOUS PORT ( PORT-A-CATH) IR;  Surgeon: Jeri Cleaning DO;  [facility];  Service: Interventional Radiology    OK LAP, RADICAL HYST W/ TUBE&OV, NODE BX N/A 12/19/2017    Procedure: HYSTERECTOMY LAPAROSCOPIC TOTAL ([address]) W/ ROBOTICS; BILATERAL SALPINGOOPHERECTOMY; LYMPH NODE DISSECTION; lysis of adhesions;  Surgeon: Elier Perez MD;  Location: [facility];  Service: Gynecology Oncology    OK LAP,DIAGNOSTIC ABDOMEN N/A 12/19/2017    Procedure: LAPAROSCOPY DIAGNOSTIC;  Surgeon: Elier Perez MD;  [facility];  Service: Gynecology Oncology    TONSILLECTOMY      US GUIDED BREAST BIOPSY RIGHT COMPLETE Right 6/28/2019       Review of Medications:   Vitamins, Supplements and Herbals: yes: Vitamin D, Folic Acid, Align, calcium citrate TID, B12 1000 mcg QD, MVI BID (MVI does not have iron); Iron 65 mg QD (2 hrs seperate from calcium) - Hx RNYGB      Current Outpatient Medications:     acetaminophen (TYLENOL) 325 mg tablet, Take 2 tablets (650 mg total) by mouth every 6 (six) hours as needed for mild pain, headaches or fever, Disp: 30 tablet, Rfl: 0    ARIPiprazole (ABILIFY) 10 mg tablet, Take 10 mg by mouth daily, Disp: , Rfl:     aspirin (ECOTRIN LOW STRENGTH) 81 mg EC tablet, Take 81 mg by mouth daily, Disp: , Rfl:     Calcium-Magnesium-Vitamin D (CALCIUM 500 PO), Take by mouth daily , Disp: , Rfl:     cholecalciferol (VITAMIN D3) 1,000 units tablet, Take 1,000 Units by mouth daily, Disp: , Rfl:     Cyanocobalamin (VITAMIN B12 PO), Take by mouth daily , Disp: , Rfl:     enoxaparin (LOVENOX) 120 mg/0 8 mL, INJECT 0 8 ML (120 MG TOTAL) UNDER THE SKIN DAILY, Disp: 30 Syringe, Rfl: 3    folic acid (FOLVITE) 1 mg tablet, Take 1 tablet (1 mg total) by mouth daily, Disp: , Rfl: 0    gemfibrozil (LOPID) 600 mg tablet, TAKE 1 TABLET BY MOUTH EVERY DAY, Disp: 90 tablet, Rfl: 1    Multiple Vitamin (MULTIVITAMIN) tablet, Take 1 tablet by mouth daily, Disp: , Rfl:     omeprazole (PriLOSEC) 20 mg delayed release capsule, Take 20 mg by mouth daily, Disp: , Rfl:     oxyCODONE (ROXICODONE) 5 mg immediate release tablet, Take 1 tablet (5 mg total) by mouth every 4 (four) hours as needed for moderate painMax Daily Amount: 30 mg, Disp: 30 tablet, Rfl: 0    quinapril (ACCUPRIL) 5 mg tablet, TAKE 1 TABLET BY MOUTH EVERYDAY AT BEDTIME, Disp: 90 tablet, Rfl: 1    saccharomyces boulardii (FLORASTOR) 250 mg capsule, Take 1 capsule (250 mg total) by mouth 2 (two) times a day, Disp: , Rfl: 0    sodium chloride, PF, 0 9 %, 10 mL by Intracatheter route daily for 30 doses 10cc syringes, Disp: 300 mL, Rfl: 3    traMADol (ULTRAM) 50 mg tablet, Take 1 tablet (50 mg total) by mouth every 6 (six) hours as needed for moderate pain, Disp: 60 tablet, Rfl: 0    venlafaxine (EFFEXOR) 75 mg tablet, Take 75 mg by mouth 3 (three) times a day  , Disp: , Rfl:     Most Recent Lab Results:  Lab Results   Component Value Date    WBC 5 51 08/08/2020    IRON 24 (L) 10/02/2019    TIBC 138 (L) 10/02/2019    FERRITIN 1,042 (H) 10/02/2019    CHOLESTEROL 193 11/01/2018    CHOL 183 10/27/2017    TRIG 88 11/01/2018    HDL 59 11/01/2018    LDLCALC 116 (H) 11/01/2018    ALT 17 08/06/2020    AST 5 08/06/2020    ALB 3 8 08/06/2020     10/27/2017     05/12/2017    SODIUM 137 08/08/2020    SODIUM 138 08/07/2020    K 4 0 08/08/2020    K 4 4 08/07/2020     08/08/2020    BUN 27 (H) 08/08/2020    BUN 26 (H) 08/07/2020    CREATININE 0 96 08/08/2020    CREATININE 1 08 08/07/2020    EGFR 64 08/08/2020    PHOS 3 5 12/25/2019    PHOS 3 2 07/08/2019    GLUCOSE 219 (H) 12/19/2017    POCGLU 97 08/18/2020    GLUF 85 01/17/2020    GLUF 133 (H) 01/08/2020    GLUC 96 08/08/2020    HGBA1C 5 1 02/03/2020    HGBA1C 6 2 08/28/2019    HGBA1C 5 5 11/01/2018    CALCIUM 9 2 08/08/2020    FOLATE 5 4 10/06/2019    MG 1 9 03/11/2020     Anthropometric Measurements:   Height: 59"  Ht Readings from Last 1 Encounters:   08/14/20 4' 11" (1 499 m)     -Weight History:   Usual Weight: 270# before RNYGB in 2001; lowest post-op wt: 200#; wt typically fluctuates between 215-230#  Ideal Body Weight: 95#  Wt Readings from Last 20 Encounters:   08/13/20 85 7 kg (189 lb)   08/06/20 86 9 kg (191 lb 9 6 oz)   08/05/20 88 1 kg (194 lb 3 2 oz)   07/30/20 88 5 kg (195 lb)   06/30/20 88 8 kg (195 lb 12 8 oz)   06/08/20 88 5 kg (195 lb)   05/06/20 83 9 kg (185 lb)   02/04/20 85 5 kg (188 lb 6 4 oz)   02/03/20 85 7 kg (189 lb)   01/31/20 83 9 kg (185 lb)   01/24/20 83 9 kg (185 lb)   01/08/20 83 9 kg (185 lb)   01/06/20 84 4 kg (186 lb)   01/05/20 84 4 kg (186 lb 1 1 oz)   01/03/20 84 4 kg (186 lb)   01/02/20 84 4 kg (186 lb)   12/24/19 82 6 kg (182 lb 1 6 oz)   12/19/19 81 9 kg (180 lb 8 oz)   12/02/19 88 7 kg (195 lb 8 oz)   11/27/19 88 kg (193 lb 14 4 oz)   -Varian: (2/25/20) 185 6#, (3/3/20) 188 6#, (3/10/20) 187 2#, (3/19/20) 186 2#, (3/24/20) 187#, (3/31/20) 185 4#, (4/10/20) 184  4#   -Home Scale Wts: (2/19/20) 185#, (8/3/20) 194# - no recent wt, feels wt is stable    -No recent wt to calculate changes  Estimated body mass index is 38 17 kg/m² as calculated from the following:    Height as of 8/14/20: 4' 11" (1 499 m)  Weight as of 8/13/20: 85 7 kg (189 lb)  Nutrition-Focused Physical Findings: n/a due to telephone call     Food/Nutrition-Related History & Client/Social History:    Current Nutrition Impact Symptoms:  [] Nausea  [] Reduced Appetite  [] Acid Reflux   [] Vomiting  [] Unintended Wt Loss  [] Malabsorption    [] Diarrhea  [] Unintended Wt Gain  [] Dumping Syndrome   [] Constipation - hx [] Thick Mucous/Secretions  [] Abdominal Pain    [] Dysgeusia (Altered Taste) [] Xerostomia (Dry Mouth) - under control; using Biotene toothpaste + mouthwash [] Gas    [] Dysosmia (Altered Smell)  [] Thrush  [] Difficulty Chewing    [] Oral Mucositis (Sore Mouth)  [x] Fatigue  [] Other:    [] Odynophagia   [] Esophagitis  [] Other:    [] Dysphagia [] Early Satiety  [x] No Problems Eating      Food Allergies: yes: had skin patch testing which showed allergy to strawberries, but is able to eat strawberries without any side effects  Food Intolerances: no    Current Diet: Regular Diet, No Restrictions and Small Frequent Meals    -Started Weight Watchers in July 2020 for more structure - states she has not been following it closely  Current Nutrition Intake: Unchanged from last visit  Appetite: Good  Nutrition Route: PO  Meal planning/preparation mainly done by: mother does cooking; sister does shopping  Activity level: Walking more now that she is back at work  Social Hx: 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  Lives with mom      25 Hr Diet Recall: has trouble incorporating vegetables; was trying new recipes once per week x3 weeks but then back pain and hospitalizations got her off track  Breakfast: breakfast scrabble (veggies, cheese, 1 egg)  Snack: fresh strawberries or Greek yogurt or Protein bar  Lunch: packing lunch for work: [place] yogurt with fresh fruit; when at home: [employer] chicken sandwich with lettuce and tomato  Dinner: has been ordering out a lot recently (mom recently had surgery, spaghetti or chicken with broccoli and mashed potatoes  Snack: Outshine strawberry bar    Beverages: water (16 9 oz x2-3), regular unsweetened tea (8-10 oz x3)   -Does not drink coffee or alcohol      -Does not separate fluids from solids, does not have discomfort while eating and drinking at the same time  Supplements:    None       Oncology Nutrition-Estimated Needs      Nutrition from 2020 in [facility] Diet[facility]   Weight type used  Adjusted weight   Weight in pounds (lbs) used for estimated needs  119 lbs   Weight in kilograms (kg) used for estimated needs  54 1 kg   Energy needs based on 25 kcal/k kcal   Energy needs based on 30 kcal/k kcal   Protein needs based on 1 g/kg  54 g   Protein needs based on 1 2 g/k g   Fluid needs based on 25 mL/kg  1350 mL   Fluid needs in ounces  46 oz   Fluid needs based on 30 mL/kg  1620 mL   Fluid needs in ounces  55 oz          Discussion/Summary: Nicki Ferguson was contacted today for RD follow up  Our visit focused on her progress towards a cancer preventative diet post-tx  Her nutrition impact sx have resolved  She has been back to work for about 3 weeks now  She says that she has not made much progress towards her nutrition goals due to her mom having a recent procedure, going back to work, and stress from her recent scans showing "increased activity" per pt  Her food recall reveals an inadequate vegetable intake and frequent dining out  Her po fluid intake has also declined because she says she is going on drives with her mom and stopping at [facility] for iced tea  Today, Nicki Ferguson refocused herself on her nutrition plan and set new goals for herself   Her goals are to 1 ) increase her vegetable intake at lunches using salads with chicken or by adding a vegetable to her [place] yogurt and fruit meal; and 2 ) replace 1 instance of eating out each week with a homemade dinner and make extra for leftovers  We will follow up in ~1 months to check on progress  She says she is going on vacation to [facility] [place] in the beginning of October and she is looking forward to this trip  Discussed/Reviewed:   · weight management  · encouraged eating every 2-3 hours (5-6 small meals/day)  · adequate hydation & tips to increase overall fluid intake  · MNT for: RNY Gastric Bypass  · a cancer preventative eating pattern as a long-term nutrition goal   · RNY Gastric Bypass lifetime vitamin/mineral supplementation  All questions and concerns addressed during todays visit  Candis Juve has RD contact information  Nutrition Diagnosis:  · Overweight/obesity related to Excess energy intake and Physical Inactivity as evidenced by BMI more than normative standard for age and sex (obesity-grade II 35-39 9)  Limited adherence to nutrition related recommendations related to competing values and priorities as evidenced by Lack of compliance or inconsistent compliance with plan    Intervention & Recommendations:  Topics addressed: Nutrition education, Balance/Variety, Meals & Snacks, Meal planning, Choosing high protein meals/snacks, Meal pattern: eating small/frequent meals (every 2-3 hours), Adequate Hydration, Vitamin & Mineral Supplements and Weight Management    Barriers: None  Readiness to change: preparation  Comprehension: verbalizes understanding  Expected Compliance: fair    Materials Provided: not applicable    Monitoring & Evaluation:  Dietitian to Monitor: Food and Nutrition Intake, Nutrtion Impact Symptoms, Body Weight, Vitamin/Mineral Intake and Biochemical Data     Goals:  · Continue lifelong vitamin/mineral supplementation for RNY Gastric Bypass  · Follow a cancer-preventative diet · Increase vegetable intake  · Limit dining out    · Progress Towards Goals: Partially Met     Nutrition Rx & Recommendations:  · Small, frequent meals/snacks may be easier to tolerate than 3 large daily meals  Aim for 5-6 small meals per day (every 2-3 hours)  · Include protein at all meals/snacks  · Follow a Cancer Preventative Nutrition Pattern: colorful, plant-based, low-fat, avoid added sugars, limit alcohol, include high fiber foods, limit processed meats, limit red meat, choose lean protein sources, use low-fat cooking methods, balance calories with physical activity, avoid excessive weight gain throughout life  · Incorporate physical activity as able/allowed  · Fluid Goal: 46-55 oz per day  · Lifetime vitamin/mineral supplementation recommended for Gastric Bypass Surgery:  · Multivitamin BID  · Iron 45 mg daily  · Vitamin D daily  · Calcium Citrate 500 mg TID (do not take with iron, needs to be 2 hrs apart from iron)  · B12 1000 mcg daily    · Resources for recipes: [employer], cancerdietitian  com, onc[employer] org/support/nutrition-and-cancer, Skinnytaste  com, www oncologynutrition  org/erfc/recipes, www cookforyourlife  org, The Essential Cancer Treatment Nutrition Guide and Cookbook: Includes 271 Healthy and Delicious Recipes  Personalized Goals:  1  Increase vegetable intake at lunch  · Salad with chicken for lunch  · Add a side of veggies (ex  Baby carrots) when you have [place] Yogurt and fresh fruit  2  Replace 1 instance of dining out with a home cooked meal   Make extra food to have for leftovers  Back to School Plan:   · Keep protein bars, low-sugar protein shakes, Greek yogurt, and 1/4 cup portions of nuts at school  · Make more than 1 new recipe per week and bring leftovers for lunch  · Bring easy to grab veggie and protein snacks        Follow Up Plan: 10/16/20 at 10am for a phone follow up   Recommend Referral to Other Providers: none at this time

## 2020-09-15 ENCOUNTER — TELEPHONE (OUTPATIENT)
Dept: GYNECOLOGIC ONCOLOGY | Facility: CLINIC | Age: 65
End: 2020-09-15

## 2020-09-15 DIAGNOSIS — I82.4Y9 ACUTE DEEP VEIN THROMBOSIS (DVT) OF PROXIMAL VEIN OF LOWER EXTREMITY, UNSPECIFIED LATERALITY (HCC): ICD-10-CM

## 2020-09-18 ENCOUNTER — NUTRITION (OUTPATIENT)
Dept: NUTRITION | Facility: CLINIC | Age: 65
End: 2020-09-18

## 2020-09-18 DIAGNOSIS — Z71.3 NUTRITIONAL COUNSELING: Primary | ICD-10-CM

## 2020-09-18 NOTE — PATIENT INSTRUCTIONS
Nutrition Rx & Recommendations:  · Small, frequent meals/snacks may be easier to tolerate than 3 large daily meals  Aim for 5-6 small meals per day (every 2-3 hours)  · Include protein at all meals/snacks  · Follow a Cancer Preventative Nutrition Pattern: colorful, plant-based, low-fat, avoid added sugars, limit alcohol, include high fiber foods, limit processed meats, limit red meat, choose lean protein sources, use low-fat cooking methods, balance calories with physical activity, avoid excessive weight gain throughout life  · Incorporate physical activity as able/allowed  · Fluid Goal: 46-55 oz per day  · Lifetime vitamin/mineral supplementation recommended for Gastric Bypass Surgery:  · Multivitamin BID  · Iron 45 mg daily  · Vitamin D daily  · Calcium Citrate 500 mg TID (do not take with iron, needs to be 2 hrs apart from iron)  · B12 1000 mcg daily    · Resources for recipes: [employer], cancerdietitian  com, [employer]/support/nutrition-and-cancer, Skinnytaste  com, www oncologynutrition  org/erfc/recipes, www cookforyourlife  org, The Essential Cancer Treatment Nutrition Guide and Cookbook: Includes 016 Healthy and Delicious Recipes  Personalized Goals:  1  Increase vegetable intake at lunch  · Salad with chicken for lunch  · Add a side of veggies (ex  Baby carrots) when you have [place] Yogurt and fresh fruit  2  Replace 1 instance of dining out with a home cooked meal   Make extra food to have for leftovers  Back to School Plan:   · Keep protein bars, low-sugar protein shakes, Greek yogurt, and 1/4 cup portions of nuts at school  · Make more than 1 new recipe per week and bring leftovers for lunch  · Bring easy to grab veggie and protein snacks        Follow Up Plan: 10/16/20 at 10am for a phone follow up   Recommend Referral to Other Providers: none at this time

## 2020-09-22 DIAGNOSIS — M54.50 LEFT LOW BACK PAIN, UNSPECIFIED CHRONICITY, UNSPECIFIED WHETHER SCIATICA PRESENT: ICD-10-CM

## 2020-09-23 ENCOUNTER — TELEPHONE (OUTPATIENT)
Dept: FAMILY MEDICINE CLINIC | Facility: CLINIC | Age: 65
End: 2020-09-23

## 2020-09-23 RX ORDER — TRAMADOL HYDROCHLORIDE 50 MG/1
50 TABLET ORAL EVERY 6 HOURS PRN
Qty: 60 TABLET | Refills: 0 | Status: SHIPPED | OUTPATIENT
Start: 2020-09-23 | End: 2020-10-14 | Stop reason: SDUPTHER

## 2020-09-23 NOTE — TELEPHONE ENCOUNTER
Pt called to follow up w/ her call from yesterday as to whether or not Dr Lachelle Handy would like to see her for her low back pain or prescribe a medication  I told pt that tramadol was being prescribed  Pt checked w/ her Rx & they hadn't received anything  Is this going to be sent or does pt need to be seen? I'm just confirming as we haven't received a conformation from pharmacy, so I was wondering if there was an error in them receiving on their end

## 2020-09-24 DIAGNOSIS — E78.5 DYSLIPIDEMIA: ICD-10-CM

## 2020-09-24 RX ORDER — GEMFIBROZIL 600 MG/1
TABLET, FILM COATED ORAL
Qty: 90 TABLET | Refills: 1 | Status: SHIPPED | OUTPATIENT
Start: 2020-09-24 | End: 2021-03-16

## 2020-09-30 ENCOUNTER — TELEPHONE (OUTPATIENT)
Dept: UROLOGY | Facility: CLINIC | Age: 65
End: 2020-09-30

## 2020-09-30 DIAGNOSIS — R31.0 GROSS HEMATURIA: Primary | ICD-10-CM

## 2020-09-30 NOTE — TELEPHONE ENCOUNTER
Patient managed by Dr Leah Ervin at the [facility] office  Patient with right ureteral obstruction secondary to endometrial carcinoma requiring right nephrostomy tube placement  Patient contacted the office reporting when she urinated today she noted blood on the toilet tissue  Patient's nephrostomy is capped  Patient denies any fever/chills, gross hematuria or dysuria  Patient feels her urination is normal   Patient concerned she may have UTI  Patient hospitalized years ago and the doctors discovered she had UTI and she had no symptoms  Discussed with patient with send message to [facility] for her advise then call patient back  Vipul Side

## 2020-09-30 NOTE — TELEPHONE ENCOUNTER
OK for patient to go for urine testing to rule out urinary tract infection  Please review ER precautions

## 2020-09-30 NOTE — TELEPHONE ENCOUNTER
Called Shira Garcia and notified her that orders were placed for her to give a urine sample    She will go tomorrow and have it taken

## 2020-10-02 ENCOUNTER — TRANSCRIBE ORDERS (OUTPATIENT)
Dept: LAB | Facility: AMBULARY SURGERY CENTER | Age: 65
End: 2020-10-02

## 2020-10-02 ENCOUNTER — APPOINTMENT (OUTPATIENT)
Dept: LAB | Facility: AMBULARY SURGERY CENTER | Age: 65
End: 2020-10-02
Payer: COMMERCIAL

## 2020-10-02 DIAGNOSIS — R31.0 GROSS HEMATURIA: Primary | ICD-10-CM

## 2020-10-02 DIAGNOSIS — R31.0 GROSS HEMATURIA: ICD-10-CM

## 2020-10-02 PROCEDURE — 87086 URINE CULTURE/COLONY COUNT: CPT

## 2020-10-02 PROCEDURE — 81001 URINALYSIS AUTO W/SCOPE: CPT

## 2020-10-05 NOTE — TELEPHONE ENCOUNTER
GERARD gipson that urine tested negative    She cn try hydrating with water and if she is having problems she is to call office

## 2020-10-08 NOTE — TELEPHONE ENCOUNTER
Patient requesting a call back to discuss previous message  Patient requesting call back before end of day she is in [facility]  She is concerned with issues  Patient would like to know should she put on the bag for now?  Please advise

## 2020-10-08 NOTE — TELEPHONE ENCOUNTER
Patient returning call  Advised we are awaiting provider response and nurse will call once we get answer

## 2020-10-08 NOTE — TELEPHONE ENCOUNTER
Patient called and said she has a nephrostomy tube and it is capped and when she went to flush it she had a lot of pain  She is not having pain now and said it was only when she flushed it  Please call patient back at [phone]

## 2020-10-09 NOTE — TELEPHONE ENCOUNTER
Call returned to patient, she states that she will be out of town through the weekend  Patient states that since she was having pain yesterday with flushes, she has uncapped and is instead attached to drainage bag  She states that she no longer has pain/pressure  Advised patient that she can again attempt to flush as ordered, if she is having pain or can not flush, ok to attach to bag  Patient states she will try this  Advised to continue to monitor and call next week with persistent issues  ER precautions reviewed  Patient verbalized understanding and agrees with plan

## 2020-10-09 NOTE — TELEPHONE ENCOUNTER
Please evaluate how long patient is out of town  I would recommend continued routine irrigation of her nephrostomy tube as well as urine testing to ensure no infection  ER precautions for fevers, chills, severe flank pain

## 2020-10-12 ENCOUNTER — TELEPHONE (OUTPATIENT)
Dept: NUTRITION | Facility: CLINIC | Age: 65
End: 2020-10-12

## 2020-10-12 NOTE — TELEPHONE ENCOUNTER
Called and spoke with patient  Rescheduled to discuss her incontinence with advanced practitioner 10/30/2020  Patient confirmed appt details  Offered sooner appt with nursing this week to trial recapping nephrostomy tube  Patient agreeable, prefers Friday 10/16 in the afternoon  Scheduled for 10/16 at 230 pm with nursing at [address] [place] [address]

## 2020-10-12 NOTE — TELEPHONE ENCOUNTER
Called to check on patient this morning  She reports she is doing well  Nephrostomy has been opened to bag all weekend draining light yellow  No flank pain, fever/chills/nausea or vomiting  Patient had not attempted to flush it again  Previously, nephrostomy had been capped since early September with no issues  Patient still has the cap but is uncomfortable reattempting to cap the nephrostomy herself  She would like an office visit to recap if that is the recommendation  She also would like to discuss her ongoing incontinence issues with advanced practitioner as well  Next nephrostomy check/change not due till December  Will review with provider for recommendations and be in touch  Patient verbalized understanding

## 2020-10-12 NOTE — TELEPHONE ENCOUNTER
OK to schedule earlier office visit to discuss symptoms  If unable to find a reasonable timeframe to discuss incontinence, patient can be seen by nursing to review nephrotomy tube capping  Dr Adela Foster had previously discussed that patient would likely require nephrostomy tube indefinitely

## 2020-10-14 ENCOUNTER — OFFICE VISIT (OUTPATIENT)
Dept: FAMILY MEDICINE CLINIC | Facility: CLINIC | Age: 65
End: 2020-10-14
Payer: COMMERCIAL

## 2020-10-14 VITALS
HEIGHT: 59 IN | SYSTOLIC BLOOD PRESSURE: 102 MMHG | TEMPERATURE: 97 F | BODY MASS INDEX: 38.38 KG/M2 | HEART RATE: 78 BPM | WEIGHT: 190.4 LBS | OXYGEN SATURATION: 98 % | DIASTOLIC BLOOD PRESSURE: 68 MMHG | RESPIRATION RATE: 18 BRPM

## 2020-10-14 DIAGNOSIS — R39.9 UTI SYMPTOMS: Primary | ICD-10-CM

## 2020-10-14 DIAGNOSIS — Z93.6 NEPHROSTOMY STATUS (HCC): ICD-10-CM

## 2020-10-14 DIAGNOSIS — M54.50 ACUTE LEFT-SIDED LOW BACK PAIN WITHOUT SCIATICA: ICD-10-CM

## 2020-10-14 DIAGNOSIS — Z23 ENCOUNTER FOR IMMUNIZATION: ICD-10-CM

## 2020-10-14 DIAGNOSIS — M54.50 LEFT LOW BACK PAIN, UNSPECIFIED CHRONICITY, UNSPECIFIED WHETHER SCIATICA PRESENT: ICD-10-CM

## 2020-10-14 PROBLEM — R78.81 BACTEREMIA: Status: RESOLVED | Noted: 2020-01-08 | Resolved: 2020-10-14

## 2020-10-14 PROBLEM — E86.0 DEHYDRATION: Status: RESOLVED | Noted: 2019-09-18 | Resolved: 2020-10-14

## 2020-10-14 PROBLEM — N39.0 UTI (URINARY TRACT INFECTION): Status: RESOLVED | Noted: 2020-08-06 | Resolved: 2020-10-14

## 2020-10-14 LAB
SL AMB  POCT GLUCOSE, UA: NORMAL
SL AMB LEUKOCYTE ESTERASE,UA: NORMAL
SL AMB POCT BILIRUBIN,UA: NORMAL
SL AMB POCT BLOOD,UA: NORMAL
SL AMB POCT CLARITY,UA: NORMAL
SL AMB POCT COLOR,UA: NORMAL
SL AMB POCT KETONES,UA: NORMAL
SL AMB POCT NITRITE,UA: NORMAL
SL AMB POCT PH,UA: 1.01
SL AMB POCT SPECIFIC GRAVITY,UA: 6
SL AMB POCT URINE PROTEIN: NORMAL
SL AMB POCT UROBILINOGEN: NORMAL

## 2020-10-14 PROCEDURE — 90471 IMMUNIZATION ADMIN: CPT | Performed by: PHYSICIAN ASSISTANT

## 2020-10-14 PROCEDURE — 99214 OFFICE O/P EST MOD 30 MIN: CPT | Performed by: PHYSICIAN ASSISTANT

## 2020-10-14 PROCEDURE — 87186 SC STD MICRODIL/AGAR DIL: CPT | Performed by: PHYSICIAN ASSISTANT

## 2020-10-14 PROCEDURE — 87086 URINE CULTURE/COLONY COUNT: CPT | Performed by: PHYSICIAN ASSISTANT

## 2020-10-14 PROCEDURE — 3725F SCREEN DEPRESSION PERFORMED: CPT | Performed by: PHYSICIAN ASSISTANT

## 2020-10-14 PROCEDURE — 81002 URINALYSIS NONAUTO W/O SCOPE: CPT | Performed by: PHYSICIAN ASSISTANT

## 2020-10-14 PROCEDURE — 90682 RIV4 VACC RECOMBINANT DNA IM: CPT | Performed by: PHYSICIAN ASSISTANT

## 2020-10-14 PROCEDURE — 1036F TOBACCO NON-USER: CPT | Performed by: PHYSICIAN ASSISTANT

## 2020-10-14 PROCEDURE — 87077 CULTURE AEROBIC IDENTIFY: CPT | Performed by: PHYSICIAN ASSISTANT

## 2020-10-14 RX ORDER — TRAMADOL HYDROCHLORIDE 50 MG/1
50 TABLET ORAL EVERY 6 HOURS PRN
Qty: 60 TABLET | Refills: 0 | Status: SHIPPED | OUTPATIENT
Start: 2020-10-14 | End: 2021-02-19 | Stop reason: ALTCHOICE

## 2020-10-14 RX ORDER — LEVOFLOXACIN 500 MG/1
500 TABLET, FILM COATED ORAL EVERY 24 HOURS
Qty: 7 TABLET | Refills: 0 | Status: SHIPPED | OUTPATIENT
Start: 2020-10-14 | End: 2020-10-21

## 2020-10-15 NOTE — TELEPHONE ENCOUNTER
Call returned to patient, she states that she is being treated by PCP for UTI  She would like to r/s nurse appointment until next week  Patient rescheduled to 10/23

## 2020-10-15 NOTE — TELEPHONE ENCOUNTER
Pt called requesting to speak with clinical regarding uti and rescheduling nurse visit 10/16/20,states pcp order urine culture yesterday

## 2020-10-16 ENCOUNTER — NUTRITION (OUTPATIENT)
Dept: NUTRITION | Facility: CLINIC | Age: 65
End: 2020-10-16

## 2020-10-16 DIAGNOSIS — Z71.3 NUTRITIONAL COUNSELING: Primary | ICD-10-CM

## 2020-10-16 LAB
BACTERIA UR CULT: ABNORMAL
BACTERIA UR CULT: ABNORMAL

## 2020-10-19 ENCOUNTER — TELEMEDICINE (OUTPATIENT)
Dept: FAMILY MEDICINE CLINIC | Facility: CLINIC | Age: 65
End: 2020-10-19
Payer: COMMERCIAL

## 2020-10-19 VITALS
HEART RATE: 66 BPM | TEMPERATURE: 97.5 F | OXYGEN SATURATION: 98 % | SYSTOLIC BLOOD PRESSURE: 104 MMHG | RESPIRATION RATE: 16 BRPM | DIASTOLIC BLOOD PRESSURE: 60 MMHG

## 2020-10-19 DIAGNOSIS — Z12.31 ENCOUNTER FOR SCREENING MAMMOGRAM FOR MALIGNANT NEOPLASM OF BREAST: ICD-10-CM

## 2020-10-19 DIAGNOSIS — G89.29 CHRONIC RIGHT-SIDED LOW BACK PAIN, UNSPECIFIED WHETHER SCIATICA PRESENT: Primary | ICD-10-CM

## 2020-10-19 DIAGNOSIS — M54.50 CHRONIC RIGHT-SIDED LOW BACK PAIN, UNSPECIFIED WHETHER SCIATICA PRESENT: Primary | ICD-10-CM

## 2020-10-19 PROCEDURE — 99213 OFFICE O/P EST LOW 20 MIN: CPT | Performed by: INTERNAL MEDICINE

## 2020-10-19 RX ORDER — CYCLOBENZAPRINE HCL 10 MG
10 TABLET ORAL
Qty: 30 TABLET | Refills: 1 | Status: SHIPPED | OUTPATIENT
Start: 2020-10-19 | End: 2021-02-19 | Stop reason: ALTCHOICE

## 2020-10-23 ENCOUNTER — PROCEDURE VISIT (OUTPATIENT)
Dept: UROLOGY | Facility: AMBULATORY SURGERY CENTER | Age: 65
End: 2020-10-23
Payer: COMMERCIAL

## 2020-10-23 VITALS
DIASTOLIC BLOOD PRESSURE: 76 MMHG | SYSTOLIC BLOOD PRESSURE: 112 MMHG | BODY MASS INDEX: 38.46 KG/M2 | HEIGHT: 59 IN | HEART RATE: 72 BPM | TEMPERATURE: 98 F

## 2020-10-23 DIAGNOSIS — Z75.8 DIFFICULTY MANAGING NEPHROSTOMY CARE (HCC): Primary | ICD-10-CM

## 2020-10-23 DIAGNOSIS — Z93.6 DIFFICULTY MANAGING NEPHROSTOMY CARE (HCC): Primary | ICD-10-CM

## 2020-10-23 PROCEDURE — 99211 OFF/OP EST MAY X REQ PHY/QHP: CPT

## 2020-10-25 DIAGNOSIS — I82.4Y9 ACUTE DEEP VEIN THROMBOSIS (DVT) OF PROXIMAL VEIN OF LOWER EXTREMITY, UNSPECIFIED LATERALITY (HCC): ICD-10-CM

## 2020-10-26 DIAGNOSIS — I82.4Y9 ACUTE DEEP VEIN THROMBOSIS (DVT) OF PROXIMAL VEIN OF LOWER EXTREMITY, UNSPECIFIED LATERALITY (HCC): ICD-10-CM

## 2020-10-28 DIAGNOSIS — N39.46 MIXED STRESS AND URGE URINARY INCONTINENCE: Primary | ICD-10-CM

## 2020-10-28 RX ORDER — OXYBUTYNIN CHLORIDE 15 MG/1
15 TABLET, EXTENDED RELEASE ORAL
Qty: 90 TABLET | Refills: 3 | Status: SHIPPED | OUTPATIENT
Start: 2020-10-28 | End: 2021-09-23

## 2020-10-30 ENCOUNTER — OFFICE VISIT (OUTPATIENT)
Dept: UROLOGY | Facility: AMBULATORY SURGERY CENTER | Age: 65
End: 2020-10-30
Payer: COMMERCIAL

## 2020-10-30 VITALS — SYSTOLIC BLOOD PRESSURE: 126 MMHG | TEMPERATURE: 98.2 F | DIASTOLIC BLOOD PRESSURE: 82 MMHG | HEART RATE: 74 BPM

## 2020-10-30 DIAGNOSIS — N32.81 OVERACTIVE BLADDER: Primary | ICD-10-CM

## 2020-10-30 DIAGNOSIS — N13.5 OBSTRUCTION OF RIGHT URETER: ICD-10-CM

## 2020-10-30 PROCEDURE — 99214 OFFICE O/P EST MOD 30 MIN: CPT | Performed by: NURSE PRACTITIONER

## 2020-10-30 RX ORDER — SODIUM CHLORIDE 0.9 % (FLUSH) 0.9 %
SYRINGE (ML) INJECTION
COMMUNITY
Start: 2020-09-28 | End: 2022-04-14

## 2020-10-30 RX ORDER — MIRABEGRON 50 MG/1
1 TABLET, FILM COATED, EXTENDED RELEASE ORAL DAILY
COMMUNITY
Start: 2020-10-09 | End: 2021-01-05

## 2020-11-04 ENCOUNTER — OFFICE VISIT (OUTPATIENT)
Dept: PAIN MEDICINE | Facility: CLINIC | Age: 65
End: 2020-11-04
Payer: COMMERCIAL

## 2020-11-04 VITALS
WEIGHT: 192 LBS | HEART RATE: 75 BPM | BODY MASS INDEX: 38.78 KG/M2 | SYSTOLIC BLOOD PRESSURE: 112 MMHG | DIASTOLIC BLOOD PRESSURE: 68 MMHG | TEMPERATURE: 98.4 F

## 2020-11-04 DIAGNOSIS — M79.18 MYOFASCIAL PAIN SYNDROME: ICD-10-CM

## 2020-11-04 DIAGNOSIS — G57.03 BILATERAL PIRIFORMIS SYNDROME: Primary | ICD-10-CM

## 2020-11-04 DIAGNOSIS — G89.4 CHRONIC PAIN SYNDROME: ICD-10-CM

## 2020-11-04 PROCEDURE — 99214 OFFICE O/P EST MOD 30 MIN: CPT | Performed by: PHYSICAL MEDICINE & REHABILITATION

## 2020-11-04 RX ORDER — GABAPENTIN 300 MG/1
300 CAPSULE ORAL 2 TIMES DAILY
Qty: 60 CAPSULE | Refills: 1 | Status: SHIPPED | OUTPATIENT
Start: 2020-11-04 | End: 2021-02-19 | Stop reason: ALTCHOICE

## 2020-11-05 ENCOUNTER — TRANSCRIBE ORDERS (OUTPATIENT)
Dept: PAIN MEDICINE | Facility: CLINIC | Age: 65
End: 2020-11-05

## 2020-11-10 ENCOUNTER — TELEPHONE (OUTPATIENT)
Dept: PAIN MEDICINE | Facility: CLINIC | Age: 65
End: 2020-11-10

## 2020-11-11 ENCOUNTER — TELEPHONE (OUTPATIENT)
Dept: UROLOGY | Facility: MEDICAL CENTER | Age: 65
End: 2020-11-11

## 2020-11-11 DIAGNOSIS — R31.9 URINARY TRACT INFECTION WITH HEMATURIA, SITE UNSPECIFIED: ICD-10-CM

## 2020-11-11 DIAGNOSIS — R31.0 GROSS HEMATURIA: Primary | ICD-10-CM

## 2020-11-11 DIAGNOSIS — N39.0 URINARY TRACT INFECTION WITH HEMATURIA, SITE UNSPECIFIED: ICD-10-CM

## 2020-11-12 ENCOUNTER — LAB (OUTPATIENT)
Dept: LAB | Facility: AMBULARY SURGERY CENTER | Age: 65
End: 2020-11-12
Payer: COMMERCIAL

## 2020-11-12 DIAGNOSIS — N13.5 OBSTRUCTION OF RIGHT URETER: ICD-10-CM

## 2020-11-12 LAB
ANION GAP SERPL CALCULATED.3IONS-SCNC: 8 MMOL/L (ref 4–13)
BUN SERPL-MCNC: 37 MG/DL (ref 5–25)
CALCIUM SERPL-MCNC: 9.7 MG/DL (ref 8.3–10.1)
CHLORIDE SERPL-SCNC: 107 MMOL/L (ref 100–108)
CO2 SERPL-SCNC: 23 MMOL/L (ref 21–32)
CREAT SERPL-MCNC: 1.11 MG/DL (ref 0.6–1.3)
GFR SERPL CREATININE-BSD FRML MDRD: 53 ML/MIN/1.73SQ M
GLUCOSE P FAST SERPL-MCNC: 93 MG/DL (ref 65–99)
POTASSIUM SERPL-SCNC: 4.2 MMOL/L (ref 3.5–5.3)
SODIUM SERPL-SCNC: 138 MMOL/L (ref 136–145)

## 2020-11-12 PROCEDURE — 36415 COLL VENOUS BLD VENIPUNCTURE: CPT

## 2020-11-12 PROCEDURE — 80048 BASIC METABOLIC PNL TOTAL CA: CPT

## 2020-11-13 ENCOUNTER — LAB (OUTPATIENT)
Dept: LAB | Facility: AMBULARY SURGERY CENTER | Age: 65
End: 2020-11-13
Payer: COMMERCIAL

## 2020-11-13 DIAGNOSIS — R31.0 GROSS HEMATURIA: ICD-10-CM

## 2020-11-13 DIAGNOSIS — C54.1 ENDOMETRIAL CANCER (HCC): Chronic | ICD-10-CM

## 2020-11-13 DIAGNOSIS — C54.1 ENDOMETRIAL CANCER (HCC): Primary | ICD-10-CM

## 2020-11-13 DIAGNOSIS — D50.8 OTHER IRON DEFICIENCY ANEMIA: ICD-10-CM

## 2020-11-13 DIAGNOSIS — E78.5 DYSLIPIDEMIA: ICD-10-CM

## 2020-11-13 LAB
BACTERIA UR QL AUTO: ABNORMAL /HPF
BILIRUB UR QL STRIP: NEGATIVE
CLARITY UR: ABNORMAL
COLOR UR: YELLOW
GLUCOSE UR STRIP-MCNC: NEGATIVE MG/DL
HGB UR QL STRIP.AUTO: ABNORMAL
KETONES UR STRIP-MCNC: NEGATIVE MG/DL
LEUKOCYTE ESTERASE UR QL STRIP: ABNORMAL
NITRITE UR QL STRIP: NEGATIVE
NON-SQ EPI CELLS URNS QL MICRO: ABNORMAL /HPF
OTHER STN SPEC: ABNORMAL
PH UR STRIP.AUTO: 6 [PH]
PROT UR STRIP-MCNC: ABNORMAL MG/DL
RBC #/AREA URNS AUTO: ABNORMAL /HPF
SP GR UR STRIP.AUTO: 1.01 (ref 1–1.03)
UROBILINOGEN UR QL STRIP.AUTO: 0.2 E.U./DL
WBC #/AREA URNS AUTO: ABNORMAL /HPF
WBC CLUMPS # UR AUTO: PRESENT /UL

## 2020-11-13 PROCEDURE — 87086 URINE CULTURE/COLONY COUNT: CPT

## 2020-11-13 PROCEDURE — 81001 URINALYSIS AUTO W/SCOPE: CPT

## 2020-11-13 PROCEDURE — 87077 CULTURE AEROBIC IDENTIFY: CPT

## 2020-11-15 LAB
BACTERIA UR CULT: ABNORMAL

## 2020-11-16 RX ORDER — FLUCONAZOLE 200 MG/1
200 TABLET ORAL DAILY
Qty: 7 TABLET | Refills: 0 | Status: SHIPPED | OUTPATIENT
Start: 2020-11-16 | End: 2020-11-23

## 2020-11-16 RX ORDER — NITROFURANTOIN 25; 75 MG/1; MG/1
100 CAPSULE ORAL 2 TIMES DAILY
Qty: 14 CAPSULE | Refills: 0 | Status: SHIPPED | OUTPATIENT
Start: 2020-11-16 | End: 2020-11-23

## 2020-11-23 ENCOUNTER — TELEPHONE (OUTPATIENT)
Dept: PAIN MEDICINE | Facility: CLINIC | Age: 65
End: 2020-11-23

## 2020-11-23 ENCOUNTER — HOSPITAL ENCOUNTER (OUTPATIENT)
Dept: RADIOLOGY | Age: 65
Discharge: HOME | End: 2020-11-23
Payer: COMMERCIAL

## 2020-11-23 DIAGNOSIS — C54.1 ENDOMETRIAL CANCER (HCC): ICD-10-CM

## 2020-11-23 PROCEDURE — 71260 CT THORAX DX C+: CPT

## 2020-11-23 PROCEDURE — 74177 CT ABD & PELVIS W/CONTRAST: CPT

## 2020-11-23 PROCEDURE — G1004 CDSM NDSC: HCPCS

## 2020-11-23 RX ADMIN — IOHEXOL 100 ML: 350 INJECTION, SOLUTION INTRAVENOUS at 08:59

## 2020-11-25 ENCOUNTER — OFFICE VISIT (OUTPATIENT)
Dept: GYNECOLOGIC ONCOLOGY | Facility: CLINIC | Age: 65
End: 2020-11-25
Payer: COMMERCIAL

## 2020-11-25 ENCOUNTER — HOSPITAL ENCOUNTER (OUTPATIENT)
Dept: RADIOLOGY | Facility: CLINIC | Age: 65
Discharge: HOME | End: 2020-11-25
Attending: PHYSICAL MEDICINE & REHABILITATION
Payer: COMMERCIAL

## 2020-11-25 VITALS
HEIGHT: 59 IN | DIASTOLIC BLOOD PRESSURE: 74 MMHG | TEMPERATURE: 98 F | HEART RATE: 70 BPM | BODY MASS INDEX: 39.61 KG/M2 | SYSTOLIC BLOOD PRESSURE: 120 MMHG | WEIGHT: 196.5 LBS

## 2020-11-25 VITALS
HEART RATE: 84 BPM | RESPIRATION RATE: 20 BRPM | SYSTOLIC BLOOD PRESSURE: 140 MMHG | DIASTOLIC BLOOD PRESSURE: 85 MMHG | TEMPERATURE: 99.2 F | OXYGEN SATURATION: 99 %

## 2020-11-25 DIAGNOSIS — M79.18 MYOFASCIAL PAIN SYNDROME: ICD-10-CM

## 2020-11-25 DIAGNOSIS — G89.4 CHRONIC PAIN SYNDROME: ICD-10-CM

## 2020-11-25 DIAGNOSIS — N13.5 OBSTRUCTION OF RIGHT URETER: ICD-10-CM

## 2020-11-25 DIAGNOSIS — G57.03 BILATERAL PIRIFORMIS SYNDROME: ICD-10-CM

## 2020-11-25 DIAGNOSIS — I82.4Y1 ACUTE DEEP VEIN THROMBOSIS (DVT) OF PROXIMAL VEIN OF RIGHT LOWER EXTREMITY (HCC): ICD-10-CM

## 2020-11-25 DIAGNOSIS — C54.1 ENDOMETRIAL CANCER (HCC): Primary | Chronic | ICD-10-CM

## 2020-11-25 DIAGNOSIS — M80.80XA: ICD-10-CM

## 2020-11-25 PROCEDURE — 20552 NJX 1/MLT TRIGGER POINT 1/2: CPT | Performed by: PHYSICAL MEDICINE & REHABILITATION

## 2020-11-25 PROCEDURE — 3078F DIAST BP <80 MM HG: CPT | Performed by: OBSTETRICS & GYNECOLOGY

## 2020-11-25 PROCEDURE — 77002 NEEDLE LOCALIZATION BY XRAY: CPT | Performed by: PHYSICAL MEDICINE & REHABILITATION

## 2020-11-25 PROCEDURE — 3074F SYST BP LT 130 MM HG: CPT | Performed by: OBSTETRICS & GYNECOLOGY

## 2020-11-25 PROCEDURE — 99215 OFFICE O/P EST HI 40 MIN: CPT | Performed by: OBSTETRICS & GYNECOLOGY

## 2020-11-25 PROCEDURE — 3008F BODY MASS INDEX DOCD: CPT | Performed by: OBSTETRICS & GYNECOLOGY

## 2020-11-25 PROCEDURE — 1036F TOBACCO NON-USER: CPT | Performed by: OBSTETRICS & GYNECOLOGY

## 2020-11-25 RX ORDER — BUPIVACAINE HCL/PF 2.5 MG/ML
10 VIAL (ML) INJECTION ONCE
Status: COMPLETED | OUTPATIENT
Start: 2020-11-25 | End: 2020-11-25

## 2020-11-25 RX ORDER — METHYLPREDNISOLONE ACETATE 80 MG/ML
80 INJECTION, SUSPENSION INTRA-ARTICULAR; INTRALESIONAL; INTRAMUSCULAR; PARENTERAL; SOFT TISSUE ONCE
Status: COMPLETED | OUTPATIENT
Start: 2020-11-25 | End: 2020-11-25

## 2020-11-25 RX ORDER — 0.9 % SODIUM CHLORIDE 0.9 %
10 VIAL (ML) INJECTION ONCE
Status: COMPLETED | OUTPATIENT
Start: 2020-11-25 | End: 2020-11-25

## 2020-11-25 RX ADMIN — BUPIVACAINE HYDROCHLORIDE 5 ML: 2.5 INJECTION, SOLUTION EPIDURAL; INFILTRATION; INTRACAUDAL at 10:13

## 2020-11-25 RX ADMIN — SODIUM CHLORIDE 2 ML: 9 INJECTION, SOLUTION INTRAMUSCULAR; INTRAVENOUS; SUBCUTANEOUS at 10:12

## 2020-11-25 RX ADMIN — IOHEXOL 4 ML: 300 INJECTION, SOLUTION INTRAVENOUS at 10:13

## 2020-11-25 RX ADMIN — METHYLPREDNISOLONE ACETATE 80 MG: 80 INJECTION, SUSPENSION INTRA-ARTICULAR; INTRALESIONAL; INTRAMUSCULAR; PARENTERAL; SOFT TISSUE at 10:13

## 2020-11-25 RX ADMIN — Medication 2 ML: at 10:12

## 2020-11-30 DIAGNOSIS — T45.1X5A CHEMOTHERAPY INDUCED NEUTROPENIA (HCC): ICD-10-CM

## 2020-11-30 DIAGNOSIS — E83.42 HYPOMAGNESEMIA: ICD-10-CM

## 2020-11-30 DIAGNOSIS — C79.51 BONE METASTASES (HCC): Primary | ICD-10-CM

## 2020-11-30 DIAGNOSIS — D70.1 CHEMOTHERAPY INDUCED NEUTROPENIA (HCC): ICD-10-CM

## 2020-11-30 RX ORDER — SODIUM CHLORIDE 9 MG/ML
20 INJECTION, SOLUTION INTRAVENOUS ONCE
Status: CANCELLED | OUTPATIENT
Start: 2020-12-07

## 2020-12-02 ENCOUNTER — TELEPHONE (OUTPATIENT)
Dept: PAIN MEDICINE | Facility: CLINIC | Age: 65
End: 2020-12-02

## 2020-12-02 ENCOUNTER — LAB (OUTPATIENT)
Dept: LAB | Facility: CLINIC | Age: 65
End: 2020-12-02
Payer: COMMERCIAL

## 2020-12-02 LAB

## 2020-12-02 PROCEDURE — 87086 URINE CULTURE/COLONY COUNT: CPT

## 2020-12-02 PROCEDURE — 81001 URINALYSIS AUTO W/SCOPE: CPT

## 2020-12-03 ENCOUNTER — TELEPHONE (OUTPATIENT)
Dept: UROLOGY | Facility: AMBULATORY SURGERY CENTER | Age: 65
End: 2020-12-03

## 2020-12-03 LAB — BACTERIA UR CULT: NORMAL

## 2020-12-04 ENCOUNTER — OFFICE VISIT (OUTPATIENT)
Dept: GYNECOLOGIC ONCOLOGY | Facility: CLINIC | Age: 65
End: 2020-12-04
Payer: COMMERCIAL

## 2020-12-04 ENCOUNTER — HOSPITAL ENCOUNTER (OUTPATIENT)
Dept: RADIOLOGY | Facility: HOSPITAL | Age: 65
Discharge: HOME | End: 2020-12-04
Attending: RADIOLOGY | Admitting: RADIOLOGY
Payer: COMMERCIAL

## 2020-12-04 VITALS
TEMPERATURE: 98.3 F | WEIGHT: 196 LBS | BODY MASS INDEX: 39.51 KG/M2 | HEART RATE: 76 BPM | DIASTOLIC BLOOD PRESSURE: 76 MMHG | RESPIRATION RATE: 18 BRPM | SYSTOLIC BLOOD PRESSURE: 118 MMHG | HEIGHT: 59 IN

## 2020-12-04 DIAGNOSIS — E78.5 DYSLIPIDEMIA: Primary | ICD-10-CM

## 2020-12-04 DIAGNOSIS — Z93.6 NEPHROSTOMY STATUS (HCC): ICD-10-CM

## 2020-12-04 DIAGNOSIS — M80.00XS OSTEOPOROSIS WITH CURRENT PATHOLOGICAL FRACTURE, UNSPECIFIED OSTEOPOROSIS TYPE, SEQUELA: ICD-10-CM

## 2020-12-04 DIAGNOSIS — Z78.9 NEED FOR FOLLOW-UP BY SOCIAL WORKER: Primary | ICD-10-CM

## 2020-12-04 DIAGNOSIS — N13.5 OBSTRUCTION OF RIGHT URETER: ICD-10-CM

## 2020-12-04 DIAGNOSIS — C54.1 ENDOMETRIAL CANCER (HCC): Chronic | ICD-10-CM

## 2020-12-04 DIAGNOSIS — N13.30 HYDRONEPHROSIS: ICD-10-CM

## 2020-12-04 PROBLEM — M81.0 OSTEOPOROSIS: Status: ACTIVE | Noted: 2020-11-30

## 2020-12-04 PROCEDURE — 1036F TOBACCO NON-USER: CPT | Performed by: OBSTETRICS & GYNECOLOGY

## 2020-12-04 PROCEDURE — 50387 CHANGE NEPHROURETERAL CATH: CPT | Performed by: RADIOLOGY

## 2020-12-04 PROCEDURE — 99215 OFFICE O/P EST HI 40 MIN: CPT | Performed by: OBSTETRICS & GYNECOLOGY

## 2020-12-04 PROCEDURE — 50387 CHANGE NEPHROURETERAL CATH: CPT

## 2020-12-04 PROCEDURE — C2625 STENT, NON-COR, TEM W/DEL SY: HCPCS

## 2020-12-04 RX ADMIN — IOHEXOL 20 ML: 350 INJECTION, SOLUTION INTRAVENOUS at 11:09

## 2020-12-07 ENCOUNTER — HOSPITAL ENCOUNTER (OUTPATIENT)
Dept: INFUSION CENTER | Facility: CLINIC | Age: 65
Discharge: HOME | End: 2020-12-07
Payer: COMMERCIAL

## 2020-12-07 VITALS
SYSTOLIC BLOOD PRESSURE: 112 MMHG | DIASTOLIC BLOOD PRESSURE: 55 MMHG | RESPIRATION RATE: 18 BRPM | HEART RATE: 78 BPM | TEMPERATURE: 97.6 F | OXYGEN SATURATION: 100 %

## 2020-12-07 DIAGNOSIS — M80.00XS OSTEOPOROSIS WITH CURRENT PATHOLOGICAL FRACTURE, UNSPECIFIED OSTEOPOROSIS TYPE, SEQUELA: ICD-10-CM

## 2020-12-07 DIAGNOSIS — C54.1 ENDOMETRIAL CANCER (HCC): Primary | Chronic | ICD-10-CM

## 2020-12-07 DIAGNOSIS — M80.00XS OSTEOPOROSIS WITH CURRENT PATHOLOGICAL FRACTURE, UNSPECIFIED OSTEOPOROSIS TYPE, SEQUELA: Primary | ICD-10-CM

## 2020-12-07 LAB
ALBUMIN SERPL BCP-MCNC: 3.5 G/DL (ref 3.5–5)
ALP SERPL-CCNC: 138 U/L (ref 46–116)
ALT SERPL W P-5'-P-CCNC: 17 U/L (ref 12–78)
AMYLASE SERPL-CCNC: 31 IU/L (ref 25–115)
ANION GAP SERPL CALCULATED.3IONS-SCNC: 10 MMOL/L (ref 4–13)
APTT PPP: 47 SECONDS (ref 23–37)
AST SERPL W P-5'-P-CCNC: 9 U/L (ref 5–45)
BACTERIA UR QL AUTO: ABNORMAL /HPF
BASOPHILS # BLD AUTO: 0.05 THOUSANDS/ΜL (ref 0–0.1)
BASOPHILS NFR BLD AUTO: 1 % (ref 0–1)
BILIRUB SERPL-MCNC: 0.12 MG/DL (ref 0.2–1)
BILIRUB UR QL STRIP: NEGATIVE
BUN SERPL-MCNC: 29 MG/DL (ref 5–25)
CALCIUM SERPL-MCNC: 9.1 MG/DL (ref 8.3–10.1)
CHLORIDE SERPL-SCNC: 102 MMOL/L (ref 100–108)
CHOLEST SERPL-MCNC: 167 MG/DL (ref 50–200)
CLARITY UR: CLEAR
CO2 SERPL-SCNC: 23 MMOL/L (ref 21–32)
COLOR UR: ABNORMAL
CREAT SERPL-MCNC: 0.95 MG/DL (ref 0.6–1.3)
CREAT UR-MCNC: <13 MG/DL
EOSINOPHIL # BLD AUTO: 0.02 THOUSAND/ΜL (ref 0–0.61)
EOSINOPHIL NFR BLD AUTO: 0 % (ref 0–6)
ERYTHROCYTE [DISTWIDTH] IN BLOOD BY AUTOMATED COUNT: 13.6 % (ref 11.6–15.1)
GFR SERPL CREATININE-BSD FRML MDRD: 64 ML/MIN/1.73SQ M
GGT SERPL-CCNC: 7 U/L (ref 5–85)
GLUCOSE SERPL-MCNC: 72 MG/DL (ref 65–140)
GLUCOSE UR STRIP-MCNC: NEGATIVE MG/DL
HCT VFR BLD AUTO: 31.5 % (ref 34.8–46.1)
HGB BLD-MCNC: 9.8 G/DL (ref 11.5–15.4)
HGB UR QL STRIP.AUTO: ABNORMAL
IMM GRANULOCYTES # BLD AUTO: 0.17 THOUSAND/UL (ref 0–0.2)
IMM GRANULOCYTES NFR BLD AUTO: 2 % (ref 0–2)
INR PPP: 1.03 (ref 0.84–1.19)
KETONES UR STRIP-MCNC: NEGATIVE MG/DL
LEUKOCYTE ESTERASE UR QL STRIP: ABNORMAL
LIPASE SERPL-CCNC: 76 U/L (ref 73–393)
LYMPHOCYTES # BLD AUTO: 0.97 THOUSANDS/ΜL (ref 0.6–4.47)
LYMPHOCYTES NFR BLD AUTO: 11 % (ref 14–44)
MAGNESIUM SERPL-MCNC: 1.9 MG/DL (ref 1.6–2.6)
MCH RBC QN AUTO: 29.3 PG (ref 26.8–34.3)
MCHC RBC AUTO-ENTMCNC: 31.1 G/DL (ref 31.4–37.4)
MCV RBC AUTO: 94 FL (ref 82–98)
MONOCYTES # BLD AUTO: 1.02 THOUSAND/ΜL (ref 0.17–1.22)
MONOCYTES NFR BLD AUTO: 11 % (ref 4–12)
NEUTROPHILS # BLD AUTO: 6.97 THOUSANDS/ΜL (ref 1.85–7.62)
NEUTS SEG NFR BLD AUTO: 75 % (ref 43–75)
NITRITE UR QL STRIP: NEGATIVE
NON-SQ EPI CELLS URNS QL MICRO: ABNORMAL /HPF
NRBC BLD AUTO-RTO: 0 /100 WBCS
PH UR STRIP.AUTO: 5.5 [PH]
PHOSPHATE SERPL-MCNC: 3.6 MG/DL (ref 2.3–4.1)
PLATELET # BLD AUTO: 304 THOUSANDS/UL (ref 149–390)
PMV BLD AUTO: 9.3 FL (ref 8.9–12.7)
POTASSIUM SERPL-SCNC: 4.8 MMOL/L (ref 3.5–5.3)
PROT SERPL-MCNC: 7.8 G/DL (ref 6.4–8.2)
PROT UR STRIP-MCNC: NEGATIVE MG/DL
PROT UR-MCNC: 12 MG/DL
PROT/CREAT UR: >0.92 MG/G{CREAT} (ref 0–0.1)
PROTHROMBIN TIME: 13.6 SECONDS (ref 11.6–14.5)
RBC # BLD AUTO: 3.35 MILLION/UL (ref 3.81–5.12)
RBC #/AREA URNS AUTO: ABNORMAL /HPF
SODIUM SERPL-SCNC: 135 MMOL/L (ref 136–145)
SP GR UR STRIP.AUTO: <=1.005 (ref 1–1.03)
TSH SERPL DL<=0.05 MIU/L-ACNC: 0.97 UIU/ML (ref 0.36–3.74)
UROBILINOGEN UR QL STRIP.AUTO: 0.2 E.U./DL
WBC # BLD AUTO: 9.2 THOUSAND/UL (ref 4.31–10.16)
WBC #/AREA URNS AUTO: ABNORMAL /HPF

## 2020-12-07 PROCEDURE — 85610 PROTHROMBIN TIME: CPT

## 2020-12-07 PROCEDURE — 83690 ASSAY OF LIPASE: CPT

## 2020-12-07 PROCEDURE — 82150 ASSAY OF AMYLASE: CPT

## 2020-12-07 PROCEDURE — 80053 COMPREHEN METABOLIC PANEL: CPT

## 2020-12-07 PROCEDURE — 96374 THER/PROPH/DIAG INJ IV PUSH: CPT

## 2020-12-07 PROCEDURE — 85025 COMPLETE CBC W/AUTO DIFF WBC: CPT

## 2020-12-07 PROCEDURE — 81001 URINALYSIS AUTO W/SCOPE: CPT | Performed by: OBSTETRICS & GYNECOLOGY

## 2020-12-07 PROCEDURE — 85730 THROMBOPLASTIN TIME PARTIAL: CPT

## 2020-12-07 PROCEDURE — 83735 ASSAY OF MAGNESIUM: CPT

## 2020-12-07 PROCEDURE — 84443 ASSAY THYROID STIM HORMONE: CPT

## 2020-12-07 PROCEDURE — 84156 ASSAY OF PROTEIN URINE: CPT | Performed by: OBSTETRICS & GYNECOLOGY

## 2020-12-07 PROCEDURE — 82570 ASSAY OF URINE CREATININE: CPT | Performed by: OBSTETRICS & GYNECOLOGY

## 2020-12-07 PROCEDURE — 84100 ASSAY OF PHOSPHORUS: CPT

## 2020-12-07 PROCEDURE — 82465 ASSAY BLD/SERUM CHOLESTEROL: CPT

## 2020-12-07 PROCEDURE — 82977 ASSAY OF GGT: CPT

## 2020-12-07 RX ORDER — ZOLEDRONIC ACID 5 MG/100ML
5 INJECTION, SOLUTION INTRAVENOUS ONCE
Status: CANCELLED | OUTPATIENT
Start: 2021-12-07

## 2020-12-07 RX ORDER — SODIUM CHLORIDE 9 MG/ML
20 INJECTION, SOLUTION INTRAVENOUS ONCE
Status: COMPLETED | OUTPATIENT
Start: 2020-12-07 | End: 2020-12-07

## 2020-12-07 RX ORDER — SODIUM CHLORIDE 9 MG/ML
20 INJECTION, SOLUTION INTRAVENOUS ONCE
Status: CANCELLED | OUTPATIENT
Start: 2021-12-07

## 2020-12-07 RX ORDER — ZOLEDRONIC ACID 5 MG/100ML
5 INJECTION, SOLUTION INTRAVENOUS ONCE
Status: COMPLETED | OUTPATIENT
Start: 2020-12-07 | End: 2020-12-07

## 2020-12-07 RX ORDER — SODIUM CHLORIDE 9 MG/ML
20 INJECTION, SOLUTION INTRAVENOUS ONCE
Status: CANCELLED | OUTPATIENT
Start: 2020-12-07

## 2020-12-07 RX ORDER — ZOLEDRONIC ACID 5 MG/100ML
5 INJECTION, SOLUTION INTRAVENOUS ONCE
Status: CANCELLED | OUTPATIENT
Start: 2020-12-07

## 2020-12-07 RX ADMIN — ZOLEDRONIC ACID 5 MG: 5 SOLUTION INTRAVENOUS at 15:10

## 2020-12-07 RX ADMIN — SODIUM CHLORIDE 20 ML/HR: 0.9 INJECTION, SOLUTION INTRAVENOUS at 14:25

## 2020-12-08 ENCOUNTER — DOCUMENTATION (OUTPATIENT)
Dept: OTHER | Facility: HOSPITAL | Age: 65
End: 2020-12-08

## 2020-12-17 ENCOUNTER — TELEPHONE (OUTPATIENT)
Dept: HEMATOLOGY ONCOLOGY | Facility: CLINIC | Age: 65
End: 2020-12-17

## 2020-12-17 DIAGNOSIS — C54.1 ENDOMETRIAL CANCER (HCC): Primary | ICD-10-CM

## 2020-12-18 ENCOUNTER — HOSPITAL ENCOUNTER (OUTPATIENT)
Dept: NON INVASIVE DIAGNOSTICS | Facility: CLINIC | Age: 65
Discharge: HOME | End: 2020-12-18
Payer: COMMERCIAL

## 2020-12-18 ENCOUNTER — TRANSCRIBE ORDERS (OUTPATIENT)
Dept: LAB | Facility: CLINIC | Age: 65
End: 2020-12-18

## 2020-12-18 ENCOUNTER — APPOINTMENT (OUTPATIENT)
Dept: LAB | Facility: CLINIC | Age: 65
End: 2020-12-18
Payer: COMMERCIAL

## 2020-12-18 ENCOUNTER — HOSPITAL ENCOUNTER (OUTPATIENT)
Dept: INFUSION CENTER | Facility: CLINIC | Age: 65
Discharge: HOME | End: 2020-12-18
Payer: COMMERCIAL

## 2020-12-18 ENCOUNTER — DOCUMENTATION (OUTPATIENT)
Dept: OTHER | Facility: HOSPITAL | Age: 65
End: 2020-12-18

## 2020-12-18 ENCOUNTER — OFFICE VISIT (OUTPATIENT)
Dept: GYNECOLOGIC ONCOLOGY | Facility: CLINIC | Age: 65
End: 2020-12-18
Payer: COMMERCIAL

## 2020-12-18 VITALS
WEIGHT: 199.5 LBS | SYSTOLIC BLOOD PRESSURE: 148 MMHG | HEART RATE: 90 BPM | HEIGHT: 59 IN | BODY MASS INDEX: 40.22 KG/M2 | DIASTOLIC BLOOD PRESSURE: 82 MMHG | TEMPERATURE: 99.1 F | RESPIRATION RATE: 18 BRPM

## 2020-12-18 VITALS — TEMPERATURE: 96.6 F

## 2020-12-18 DIAGNOSIS — C54.1 ENDOMETRIAL CANCER (HCC): Primary | ICD-10-CM

## 2020-12-18 DIAGNOSIS — Z45.2 ENCOUNTER FOR CENTRAL LINE CARE: ICD-10-CM

## 2020-12-18 DIAGNOSIS — I82.4Y1 ACUTE DEEP VEIN THROMBOSIS (DVT) OF PROXIMAL VEIN OF RIGHT LOWER EXTREMITY (HCC): ICD-10-CM

## 2020-12-18 DIAGNOSIS — C54.1 ENDOMETRIAL CANCER (HCC): ICD-10-CM

## 2020-12-18 DIAGNOSIS — C54.1 ENDOMETRIAL CANCER (HCC): Primary | Chronic | ICD-10-CM

## 2020-12-18 DIAGNOSIS — C54.1 ENDOMETRIAL CANCER (HCC): Chronic | ICD-10-CM

## 2020-12-18 DIAGNOSIS — Z93.6 NEPHROSTOMY STATUS (HCC): ICD-10-CM

## 2020-12-18 LAB
ALBUMIN SERPL BCP-MCNC: 3.6 G/DL (ref 3.5–5)
ALP SERPL-CCNC: 131 U/L (ref 46–116)
ALT SERPL W P-5'-P-CCNC: 16 U/L (ref 12–78)
AMYLASE SERPL-CCNC: 33 IU/L (ref 25–115)
ANION GAP SERPL CALCULATED.3IONS-SCNC: 12 MMOL/L (ref 4–13)
APTT PPP: 47 SECONDS (ref 23–37)
AST SERPL W P-5'-P-CCNC: 6 U/L (ref 5–45)
ATRIAL RATE: 87 BPM
BACTERIA UR QL AUTO: ABNORMAL /HPF
BASOPHILS # BLD AUTO: 0.05 THOUSANDS/ΜL (ref 0–0.1)
BASOPHILS NFR BLD AUTO: 1 % (ref 0–1)
BILIRUB SERPL-MCNC: 0.19 MG/DL (ref 0.2–1)
BILIRUB UR QL STRIP: NEGATIVE
BUN SERPL-MCNC: 19 MG/DL (ref 5–25)
CALCIUM SERPL-MCNC: 8.7 MG/DL (ref 8.3–10.1)
CHLORIDE SERPL-SCNC: 107 MMOL/L (ref 100–108)
CHOLEST SERPL-MCNC: 186 MG/DL (ref 50–200)
CLARITY UR: CLEAR
CO2 SERPL-SCNC: 22 MMOL/L (ref 21–32)
COLOR UR: YELLOW
CREAT SERPL-MCNC: 0.9 MG/DL (ref 0.6–1.3)
CREAT UR-MCNC: 26 MG/DL
EOSINOPHIL # BLD AUTO: 0.01 THOUSAND/ΜL (ref 0–0.61)
EOSINOPHIL NFR BLD AUTO: 0 % (ref 0–6)
ERYTHROCYTE [DISTWIDTH] IN BLOOD BY AUTOMATED COUNT: 14.4 % (ref 11.6–15.1)
GFR SERPL CREATININE-BSD FRML MDRD: 68 ML/MIN/1.73SQ M
GGT SERPL-CCNC: 10 U/L (ref 5–85)
GLUCOSE P FAST SERPL-MCNC: 99 MG/DL (ref 65–99)
GLUCOSE SERPL-MCNC: 99 MG/DL (ref 65–140)
GLUCOSE UR STRIP-MCNC: NEGATIVE MG/DL
HCT VFR BLD AUTO: 33.6 % (ref 34.8–46.1)
HGB BLD-MCNC: 10.4 G/DL (ref 11.5–15.4)
HGB UR QL STRIP.AUTO: ABNORMAL
IMM GRANULOCYTES # BLD AUTO: 0.17 THOUSAND/UL (ref 0–0.2)
IMM GRANULOCYTES NFR BLD AUTO: 2 % (ref 0–2)
INR PPP: 1.05 (ref 0.84–1.19)
KETONES UR STRIP-MCNC: NEGATIVE MG/DL
LEUKOCYTE ESTERASE UR QL STRIP: ABNORMAL
LIPASE SERPL-CCNC: 65 U/L (ref 73–393)
LYMPHOCYTES # BLD AUTO: 0.85 THOUSANDS/ΜL (ref 0.6–4.47)
LYMPHOCYTES NFR BLD AUTO: 12 % (ref 14–44)
MAGNESIUM SERPL-MCNC: 2.1 MG/DL (ref 1.6–2.6)
MCH RBC QN AUTO: 29.2 PG (ref 26.8–34.3)
MCHC RBC AUTO-ENTMCNC: 31 G/DL (ref 31.4–37.4)
MCV RBC AUTO: 94 FL (ref 82–98)
MONOCYTES # BLD AUTO: 0.65 THOUSAND/ΜL (ref 0.17–1.22)
MONOCYTES NFR BLD AUTO: 9 % (ref 4–12)
NEUTROPHILS # BLD AUTO: 5.23 THOUSANDS/ΜL (ref 1.85–7.62)
NEUTS SEG NFR BLD AUTO: 76 % (ref 43–75)
NITRITE UR QL STRIP: NEGATIVE
NON-SQ EPI CELLS URNS QL MICRO: ABNORMAL /HPF
NRBC BLD AUTO-RTO: 0 /100 WBCS
P AXIS: 44 DEGREES
PH UR STRIP.AUTO: 5.5 [PH]
PHOSPHATE SERPL-MCNC: 3.8 MG/DL (ref 2.3–4.1)
PLATELET # BLD AUTO: 350 THOUSANDS/UL (ref 149–390)
PMV BLD AUTO: 9.1 FL (ref 8.9–12.7)
POTASSIUM SERPL-SCNC: 4.6 MMOL/L (ref 3.5–5.3)
PR INTERVAL: 142 MS
PROT SERPL-MCNC: 8 G/DL (ref 6.4–8.2)
PROT UR STRIP-MCNC: ABNORMAL MG/DL
PROT UR-MCNC: 65 MG/DL
PROT/CREAT UR: 2.5 MG/G{CREAT} (ref 0–0.1)
PROTHROMBIN TIME: 13.9 SECONDS (ref 11.6–14.5)
QRS AXIS: -35 DEGREES
QRSD INTERVAL: 90 MS
QT INTERVAL: 366 MS
QTC INTERVAL: 440 MS
RBC # BLD AUTO: 3.56 MILLION/UL (ref 3.81–5.12)
RBC #/AREA URNS AUTO: ABNORMAL /HPF
SODIUM SERPL-SCNC: 141 MMOL/L (ref 136–145)
SP GR UR STRIP.AUTO: 1.01 (ref 1–1.03)
T WAVE AXIS: 32 DEGREES
T3 SERPL-MCNC: 1.2 NG/ML (ref 0.6–1.8)
T4 FREE SERPL-MCNC: 1.01 NG/DL (ref 0.76–1.46)
TSH SERPL DL<=0.05 MIU/L-ACNC: 0.86 UIU/ML (ref 0.36–3.74)
UROBILINOGEN UR QL STRIP.AUTO: 0.2 E.U./DL
VENTRICULAR RATE: 87 BPM
WBC # BLD AUTO: 6.96 THOUSAND/UL (ref 4.31–10.16)
WBC #/AREA URNS AUTO: ABNORMAL /HPF

## 2020-12-18 PROCEDURE — 3008F BODY MASS INDEX DOCD: CPT | Performed by: OBSTETRICS & GYNECOLOGY

## 2020-12-18 PROCEDURE — 99213 OFFICE O/P EST LOW 20 MIN: CPT | Performed by: OBSTETRICS & GYNECOLOGY

## 2020-12-18 PROCEDURE — 93306 TTE W/DOPPLER COMPLETE: CPT

## 2020-12-18 PROCEDURE — 84443 ASSAY THYROID STIM HORMONE: CPT

## 2020-12-18 PROCEDURE — 83735 ASSAY OF MAGNESIUM: CPT

## 2020-12-18 PROCEDURE — 84156 ASSAY OF PROTEIN URINE: CPT

## 2020-12-18 PROCEDURE — 82465 ASSAY BLD/SERUM CHOLESTEROL: CPT

## 2020-12-18 PROCEDURE — 85610 PROTHROMBIN TIME: CPT

## 2020-12-18 PROCEDURE — 93010 ELECTROCARDIOGRAM REPORT: CPT | Performed by: INTERNAL MEDICINE

## 2020-12-18 PROCEDURE — 84439 ASSAY OF FREE THYROXINE: CPT

## 2020-12-18 PROCEDURE — 84480 ASSAY TRIIODOTHYRONINE (T3): CPT

## 2020-12-18 PROCEDURE — 82150 ASSAY OF AMYLASE: CPT

## 2020-12-18 PROCEDURE — 83690 ASSAY OF LIPASE: CPT

## 2020-12-18 PROCEDURE — 82977 ASSAY OF GGT: CPT

## 2020-12-18 PROCEDURE — 85025 COMPLETE CBC W/AUTO DIFF WBC: CPT

## 2020-12-18 PROCEDURE — 85730 THROMBOPLASTIN TIME PARTIAL: CPT

## 2020-12-18 PROCEDURE — 84100 ASSAY OF PHOSPHORUS: CPT

## 2020-12-18 PROCEDURE — 80053 COMPREHEN METABOLIC PANEL: CPT

## 2020-12-18 PROCEDURE — 93306 TTE W/DOPPLER COMPLETE: CPT | Performed by: INTERNAL MEDICINE

## 2020-12-18 PROCEDURE — 82570 ASSAY OF URINE CREATININE: CPT

## 2020-12-18 PROCEDURE — 81001 URINALYSIS AUTO W/SCOPE: CPT

## 2020-12-18 PROCEDURE — 93005 ELECTROCARDIOGRAM TRACING: CPT

## 2020-12-18 RX ORDER — ONDANSETRON 4 MG/1
4 TABLET, FILM COATED ORAL EVERY 8 HOURS PRN
Qty: 30 TABLET | Refills: 0 | Status: SHIPPED | OUTPATIENT
Start: 2020-12-18 | End: 2021-04-02 | Stop reason: ALTCHOICE

## 2020-12-21 ENCOUNTER — PATIENT OUTREACH (OUTPATIENT)
Dept: CASE MANAGEMENT | Facility: HOSPITAL | Age: 65
End: 2020-12-21

## 2020-12-21 ENCOUNTER — HOSPITAL ENCOUNTER (OUTPATIENT)
Dept: INFUSION CENTER | Facility: CLINIC | Age: 65
Discharge: HOME | End: 2020-12-21
Payer: COMMERCIAL

## 2020-12-21 VITALS
RESPIRATION RATE: 18 BRPM | DIASTOLIC BLOOD PRESSURE: 58 MMHG | TEMPERATURE: 97 F | SYSTOLIC BLOOD PRESSURE: 123 MMHG | OXYGEN SATURATION: 95 % | BODY MASS INDEX: 40.12 KG/M2 | HEART RATE: 66 BPM | WEIGHT: 199 LBS | HEIGHT: 59 IN

## 2020-12-21 PROCEDURE — 96417 CHEMO IV INFUS EACH ADDL SEQ: CPT

## 2020-12-21 PROCEDURE — J9035Q0 INV BEVACIZUMAB 400MG/16ML 16 ML: Performed by: OBSTETRICS & GYNECOLOGY

## 2020-12-21 PROCEDURE — 96413 CHEMO IV INFUSION 1 HR: CPT

## 2020-12-21 PROCEDURE — J9999Q0 INV ATEZOLIZUMAB 1200MG/20ML 20 ML: Performed by: OBSTETRICS & GYNECOLOGY

## 2020-12-21 RX ORDER — SODIUM CHLORIDE 9 MG/ML
20 INJECTION, SOLUTION INTRAVENOUS CONTINUOUS
Status: DISCONTINUED | OUTPATIENT
Start: 2020-12-21 | End: 2020-12-24 | Stop reason: HOSPADM

## 2020-12-21 RX ADMIN — Medication 1200 MG: at 11:28

## 2020-12-21 RX ADMIN — Medication 1358 MG: at 12:35

## 2020-12-21 RX ADMIN — SODIUM CHLORIDE 20 ML/HR: 0.9 INJECTION, SOLUTION INTRAVENOUS at 10:37

## 2020-12-22 ENCOUNTER — DOCUMENTATION (OUTPATIENT)
Dept: OTHER | Facility: HOSPITAL | Age: 65
End: 2020-12-22

## 2020-12-22 ENCOUNTER — PATIENT OUTREACH (OUTPATIENT)
Dept: CASE MANAGEMENT | Facility: HOSPITAL | Age: 65
End: 2020-12-22

## 2020-12-28 ENCOUNTER — HOSPITAL ENCOUNTER (OUTPATIENT)
Dept: INFUSION CENTER | Facility: CLINIC | Age: 65
Discharge: HOME | End: 2020-12-28
Payer: COMMERCIAL

## 2020-12-28 ENCOUNTER — DOCUMENTATION (OUTPATIENT)
Dept: OTHER | Facility: HOSPITAL | Age: 65
End: 2020-12-28

## 2020-12-28 VITALS — TEMPERATURE: 97.2 F

## 2020-12-28 DIAGNOSIS — C54.1 ENDOMETRIAL CANCER (HCC): Primary | ICD-10-CM

## 2020-12-28 DIAGNOSIS — C54.1 ENDOMETRIAL CANCER (HCC): ICD-10-CM

## 2020-12-28 DIAGNOSIS — Z45.2 ENCOUNTER FOR CENTRAL LINE CARE: Primary | ICD-10-CM

## 2020-12-28 LAB
ALBUMIN SERPL BCP-MCNC: 3.7 G/DL (ref 3.5–5)
ALP SERPL-CCNC: 167 U/L (ref 46–116)
ALT SERPL W P-5'-P-CCNC: 23 U/L (ref 12–78)
ANION GAP SERPL CALCULATED.3IONS-SCNC: 11 MMOL/L (ref 4–13)
AST SERPL W P-5'-P-CCNC: 15 U/L (ref 5–45)
BASOPHILS # BLD AUTO: 0.05 THOUSANDS/ΜL (ref 0–0.1)
BASOPHILS NFR BLD AUTO: 1 % (ref 0–1)
BILIRUB SERPL-MCNC: 0.19 MG/DL (ref 0.2–1)
BUN SERPL-MCNC: 28 MG/DL (ref 5–25)
CALCIUM SERPL-MCNC: 8.8 MG/DL (ref 8.3–10.1)
CHLORIDE SERPL-SCNC: 103 MMOL/L (ref 100–108)
CO2 SERPL-SCNC: 22 MMOL/L (ref 21–32)
CREAT SERPL-MCNC: 1.19 MG/DL (ref 0.6–1.3)
EOSINOPHIL # BLD AUTO: 0.01 THOUSAND/ΜL (ref 0–0.61)
EOSINOPHIL NFR BLD AUTO: 0 % (ref 0–6)
ERYTHROCYTE [DISTWIDTH] IN BLOOD BY AUTOMATED COUNT: 14.2 % (ref 11.6–15.1)
GFR SERPL CREATININE-BSD FRML MDRD: 49 ML/MIN/1.73SQ M
GLUCOSE SERPL-MCNC: 89 MG/DL (ref 65–140)
HCT VFR BLD AUTO: 34.2 % (ref 34.8–46.1)
HGB BLD-MCNC: 10.5 G/DL (ref 11.5–15.4)
IMM GRANULOCYTES # BLD AUTO: 0.17 THOUSAND/UL (ref 0–0.2)
IMM GRANULOCYTES NFR BLD AUTO: 2 % (ref 0–2)
LYMPHOCYTES # BLD AUTO: 1.16 THOUSANDS/ΜL (ref 0.6–4.47)
LYMPHOCYTES NFR BLD AUTO: 14 % (ref 14–44)
MAGNESIUM SERPL-MCNC: 2 MG/DL (ref 1.6–2.6)
MCH RBC QN AUTO: 28.9 PG (ref 26.8–34.3)
MCHC RBC AUTO-ENTMCNC: 30.7 G/DL (ref 31.4–37.4)
MCV RBC AUTO: 94 FL (ref 82–98)
MONOCYTES # BLD AUTO: 0.76 THOUSAND/ΜL (ref 0.17–1.22)
MONOCYTES NFR BLD AUTO: 9 % (ref 4–12)
NEUTROPHILS # BLD AUTO: 6.31 THOUSANDS/ΜL (ref 1.85–7.62)
NEUTS SEG NFR BLD AUTO: 74 % (ref 43–75)
NRBC BLD AUTO-RTO: 0 /100 WBCS
PHOSPHATE SERPL-MCNC: 3.3 MG/DL (ref 2.3–4.1)
PLATELET # BLD AUTO: 337 THOUSANDS/UL (ref 149–390)
PMV BLD AUTO: 9 FL (ref 8.9–12.7)
POTASSIUM SERPL-SCNC: 5.4 MMOL/L (ref 3.5–5.3)
PROT SERPL-MCNC: 8.1 G/DL (ref 6.4–8.2)
RBC # BLD AUTO: 3.63 MILLION/UL (ref 3.81–5.12)
SODIUM SERPL-SCNC: 136 MMOL/L (ref 136–145)
WBC # BLD AUTO: 8.46 THOUSAND/UL (ref 4.31–10.16)

## 2020-12-28 PROCEDURE — 80053 COMPREHEN METABOLIC PANEL: CPT

## 2020-12-28 PROCEDURE — 85025 COMPLETE CBC W/AUTO DIFF WBC: CPT

## 2020-12-28 PROCEDURE — 84100 ASSAY OF PHOSPHORUS: CPT

## 2020-12-28 PROCEDURE — 83735 ASSAY OF MAGNESIUM: CPT

## 2020-12-30 DIAGNOSIS — C54.1 ENDOMETRIAL CANCER (HCC): Primary | ICD-10-CM

## 2021-01-03 DIAGNOSIS — N32.81 OAB (OVERACTIVE BLADDER): Primary | ICD-10-CM

## 2021-01-04 ENCOUNTER — DOCUMENTATION (OUTPATIENT)
Dept: OTHER | Facility: HOSPITAL | Age: 66
End: 2021-01-04

## 2021-01-04 ENCOUNTER — TELEPHONE (OUTPATIENT)
Dept: NUTRITION | Facility: CLINIC | Age: 66
End: 2021-01-04

## 2021-01-04 ENCOUNTER — HOSPITAL ENCOUNTER (OUTPATIENT)
Dept: INFUSION CENTER | Facility: CLINIC | Age: 66
Discharge: HOME | End: 2021-01-04
Payer: COMMERCIAL

## 2021-01-04 VITALS — TEMPERATURE: 97.1 F

## 2021-01-04 DIAGNOSIS — Z45.2 ENCOUNTER FOR CENTRAL LINE CARE: ICD-10-CM

## 2021-01-04 DIAGNOSIS — C54.1 ENDOMETRIAL CANCER (HCC): Primary | ICD-10-CM

## 2021-01-04 LAB
ALBUMIN SERPL BCP-MCNC: 3.8 G/DL (ref 3.5–5)
ALP SERPL-CCNC: 178 U/L (ref 46–116)
ALT SERPL W P-5'-P-CCNC: 21 U/L (ref 12–78)
ANION GAP SERPL CALCULATED.3IONS-SCNC: 13 MMOL/L (ref 4–13)
AST SERPL W P-5'-P-CCNC: 16 U/L (ref 5–45)
BASOPHILS # BLD AUTO: 0.07 THOUSANDS/ΜL (ref 0–0.1)
BASOPHILS NFR BLD AUTO: 1 % (ref 0–1)
BILIRUB SERPL-MCNC: 0.23 MG/DL (ref 0.2–1)
BUN SERPL-MCNC: 23 MG/DL (ref 5–25)
CALCIUM SERPL-MCNC: 8.9 MG/DL (ref 8.3–10.1)
CHLORIDE SERPL-SCNC: 102 MMOL/L (ref 100–108)
CO2 SERPL-SCNC: 19 MMOL/L (ref 21–32)
CREAT SERPL-MCNC: 0.96 MG/DL (ref 0.6–1.3)
EOSINOPHIL # BLD AUTO: 0.02 THOUSAND/ΜL (ref 0–0.61)
EOSINOPHIL NFR BLD AUTO: 0 % (ref 0–6)
ERYTHROCYTE [DISTWIDTH] IN BLOOD BY AUTOMATED COUNT: 13.5 % (ref 11.6–15.1)
GFR SERPL CREATININE-BSD FRML MDRD: 63 ML/MIN/1.73SQ M
GLUCOSE SERPL-MCNC: 91 MG/DL (ref 65–140)
HCT VFR BLD AUTO: 34 % (ref 34.8–46.1)
HGB BLD-MCNC: 10.6 G/DL (ref 11.5–15.4)
IMM GRANULOCYTES # BLD AUTO: 0.12 THOUSAND/UL (ref 0–0.2)
IMM GRANULOCYTES NFR BLD AUTO: 2 % (ref 0–2)
LYMPHOCYTES # BLD AUTO: 1.06 THOUSANDS/ΜL (ref 0.6–4.47)
LYMPHOCYTES NFR BLD AUTO: 14 % (ref 14–44)
MAGNESIUM SERPL-MCNC: 2.1 MG/DL (ref 1.6–2.6)
MCH RBC QN AUTO: 29.4 PG (ref 26.8–34.3)
MCHC RBC AUTO-ENTMCNC: 31.2 G/DL (ref 31.4–37.4)
MCV RBC AUTO: 94 FL (ref 82–98)
MONOCYTES # BLD AUTO: 0.77 THOUSAND/ΜL (ref 0.17–1.22)
MONOCYTES NFR BLD AUTO: 10 % (ref 4–12)
NEUTROPHILS # BLD AUTO: 5.58 THOUSANDS/ΜL (ref 1.85–7.62)
NEUTS SEG NFR BLD AUTO: 73 % (ref 43–75)
NRBC BLD AUTO-RTO: 0 /100 WBCS
PHOSPHATE SERPL-MCNC: 3.4 MG/DL (ref 2.3–4.1)
PLATELET # BLD AUTO: 346 THOUSANDS/UL (ref 149–390)
PMV BLD AUTO: 9.7 FL (ref 8.9–12.7)
POTASSIUM SERPL-SCNC: 4.4 MMOL/L (ref 3.5–5.3)
PROT SERPL-MCNC: 8.1 G/DL (ref 6.4–8.2)
RBC # BLD AUTO: 3.61 MILLION/UL (ref 3.81–5.12)
SODIUM SERPL-SCNC: 134 MMOL/L (ref 136–145)
WBC # BLD AUTO: 7.62 THOUSAND/UL (ref 4.31–10.16)

## 2021-01-04 PROCEDURE — 84100 ASSAY OF PHOSPHORUS: CPT

## 2021-01-04 PROCEDURE — 83735 ASSAY OF MAGNESIUM: CPT

## 2021-01-04 PROCEDURE — 85025 COMPLETE CBC W/AUTO DIFF WBC: CPT

## 2021-01-04 PROCEDURE — 80053 COMPREHEN METABOLIC PANEL: CPT

## 2021-01-04 NOTE — PROGRESS NOTES
Patient was seen in the [facility] for her Cycle 1 Day 15 lab draw  Patient encounter was completed as per protocol  Patient AEs and conmeds were reviewed  Patient reported having intermittant heartburn upon taking the Rucaparib pill  Patient reported that she is still experiencing shortness of breath with exertion  Patient stated that she has experienced some nausea, which was made worse when taking Zofran  We plan to speak with patient provider regarding different options for antiemetics  Patient was made aware of future appointments and told to call with any questions or concerns

## 2021-01-04 NOTE — PROGRESS NOTES
Patient to [facility] for Lab testing / Port maintenance: Offers no complaints at present time: Left PAC accessed without difficulty: Good blood return noted: Labs drawn per MD order

## 2021-01-04 NOTE — PROGRESS NOTES
Outpatient Oncology Nutrition Consult  Type of Consult: Follow Up  Care Location: [facility]     Nutrition Assessment:  Oncology Diagnosis & Treatments:   Endometrial cancer  -S/p surgery 12/19/17    -Recurrence 7/8/19    -S/p chemotherapy (carboplatin/taxol from 7/30/19-1/6/20)  -S/p whole pelvis RT (2/4/20- 4/13/20)  -Disease progression    -ENDOBAR trial started 12/21/20  Oncology History   Endometrial cancer ([facility] [address] )   11/17/2017 Initial Diagnosis    Endometrial cancer ([facility] [address] )     11/17/2017 Biopsy    ENDOMETRIAL BIOPSY: WELL DIFFERENTIATED ENDOMETRIAL ADENOCARCINOMA (FIGO I) WITH FOCALMUCINOUS FEATURES  Part B: ENDOCERVICAL POLYP:BENIGN ENDOCERVICAL      12/19/2017 Surgery    Robotic assisted total laparoscopic hysterectomy with bilateral salpingo-oophorectomy and sentinel bilateral pelvic lymph node dissection  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 (4 4 x 3 2 cm tumor, 9 4/15 4mm invasion, NO LVSI, washings revealed atypical cellular changes)     12/19/2017 Genetic Testing    Morrison testing negative     6/28/2019 Biopsy    A  Breast, Right, US BX Right Breast 1000 4 cmfn:  - Benign breast tissue with focal histiocytic aggregate  See comment   - Negative for atypia and in-situ or invasive carcinoma  7/8/2019 Recurrence    Presented with right lower extremity DVT and CT demonstrating right pelvic sidewall mass with venous, ureteral and nerve compression causing significant neuropathic pain  Biopsy:  Lymph Node, Right pelvic lymph node x3:  - High-grade adenocarcinoma  7/30/2019 - 1/6/2020 Chemotherapy    Taxol 175 mg/m2 and carboplatin AUC 6 every 21 days  Dose was reduced to taxol 135 mg/m2 and carboplatin AUC 5  Completed 6 cycles  Treatment protracted due to multiple hospital admissions       2/24/2020 - 4/13/2020 Radiation    adjuvant external beam radiation therapy to the whole pelvis to 4500 cGy followed by boost to gross disease of an additional 2200 cGy 11/23/2020 Progression    New necrotic adenopathy in the retroperitoneum on CT      12/21/2020 -  Chemotherapy    Began chemotherapy with rucaparib, atezolizumab, and bevacizumab as per [facility] clinical trial        PMH:  RNYGB (2001 at [address])    Past Medical History:   Diagnosis Date    Anemia     Chemotherapy follow-up examination     Depression     Endometrial cancer ([address] ) 12/2017    Hyperglycemia     vx type 2 dm -- last assessed 4/1/14; resolved 11/7/17    Hyperlipidemia     Hypertension     Obesity     last assessed 10/14/17; resolved 11/7/17     Past Surgical History:   Procedure Laterality Date    ABDOMINAL SURGERY      GASTRIC BYPASS    CHOLECYSTECTOMY      at the time of gastric bypass    COLONOSCOPY      CT NEEDLE BIOPSY LYMPH NODE  7/8/2019    FL GUIDED CENTRAL VENOUS ACCESS DEVICE INSERTION  11/12/2019    GASTRIC BYPASS      HYSTERECTOMY Bilateral     total abdominal with salpingo-oophorectomy    IR NEPHROSTOMY TUBE PLACEMENT  7/26/2019    IR PICC LINE  9/27/2019    IR PORT PLACEMENT  7/26/2019    IR PORT REMOVAL  9/20/2019    OOPHORECTOMY Bilateral     MN INSJ TUNNELED CTR VAD W/SUBQ PORT AGE 5 YR/> Left 11/12/2019    Procedure: INSERTION VENOUS PORT ( PORT-A-CATH) IR;  Surgeon: Arlin Romano DO;  [facility];  Service: Interventional Radiology    MN LAP, RADICAL HYST W/ TUBE&OV, NODE BX N/A 12/19/2017    Procedure: HYSTERECTOMY LAPAROSCOPIC TOTAL ([address] [place] [address]) W/ ROBOTICS; BILATERAL SALPINGOOPHERECTOMY; LYMPH NODE DISSECTION; lysis of adhesions;  Surgeon: Asim Santiago MD;  Location: [facility];  Service: Gynecology Oncology    MN LAP,DIAGNOSTIC ABDOMEN N/A 12/19/2017    Procedure: LAPAROSCOPY DIAGNOSTIC;  Surgeon: Asim Santiago MD;  [facility];  Service: Gynecology Oncology    TONSILLECTOMY      US GUIDED BREAST BIOPSY RIGHT COMPLETE Right 6/28/2019       Review of Medications:   Vitamins, Supplements and Herbals: yes: Hx RNYGB regimen: Vitamin D, Folic Acid, Align, calcium citrate TID, B12 1000 mcg QD, MVI BID (MVI does not have iron);  Iron 65 mg QD (2 hrs seperate from calcium)    Current Outpatient Medications:     acetaminophen (TYLENOL) 325 mg tablet, Take 2 tablets (650 mg total) by mouth every 6 (six) hours as needed for mild pain, headaches or fever, Disp: 30 tablet, Rfl: 0    ARIPiprazole (ABILIFY) 10 mg tablet, Take 10 mg by mouth daily, Disp: , Rfl:     aspirin (ECOTRIN LOW STRENGTH) 81 mg EC tablet, Take 81 mg by mouth daily, Disp: , Rfl:     Calcium-Magnesium-Vitamin D (CALCIUM 500 PO), Take by mouth daily , Disp: , Rfl:     cholecalciferol (VITAMIN D3) 1,000 units tablet, Take 1,000 Units by mouth daily, Disp: , Rfl:     Cyanocobalamin (VITAMIN B12 PO), Take by mouth daily , Disp: , Rfl:     cyclobenzaprine (FLEXERIL) 10 mg tablet, Take 1 tablet (10 mg total) by mouth daily at bedtime as needed for muscle spasms, Disp: 30 tablet, Rfl: 1    enoxaparin (LOVENOX) 120 mg/0 8 mL, INJECT 0 8 ML (120 MG TOTAL) UNDER THE SKIN DAILY, Disp: 30 Syringe, Rfl: 3    folic acid (FOLVITE) 1 mg tablet, Take 1 tablet (1 mg total) by mouth daily, Disp: , Rfl: 0    gabapentin (NEURONTIN) 300 mg capsule, Take 1 capsule (300 mg total) by mouth 2 (two) times a day, Disp: 60 capsule, Rfl: 1    gemfibrozil (LOPID) 600 mg tablet, TAKE 1 TABLET BY MOUTH EVERY DAY, Disp: 90 tablet, Rfl: 1    Multiple Vitamin (MULTIVITAMIN) tablet, Take 1 tablet by mouth daily, Disp: , Rfl:     Myrbetriq 50 MG TB24, TAKE 1 TABLET BY MOUTH EVERY DAY, Disp: 30 tablet, Rfl: 5    omeprazole (PriLOSEC) 20 mg delayed release capsule, Take 20 mg by mouth daily, Disp: , Rfl:     ondansetron (ZOFRAN) 4 mg tablet, Take 1 tablet (4 mg total) by mouth every 8 (eight) hours as needed for nausea or vomiting, Disp: 30 tablet, Rfl: 0    oxybutynin (DITROPAN XL) 15 MG 24 hr tablet, Take 1 tablet (15 mg total) by mouth daily at bedtime, Disp: 90 tablet, Rfl: 3    quinapril (ACCUPRIL) 5 mg tablet, TAKE 1 TABLET BY MOUTH EVERYDAY AT BEDTIME, Disp: 90 tablet, Rfl: 1    rucaparib (RUBRACA) 300 mg tablet, Take 600 mg by mouth every 12 (twelve) hours Take with or without food  Do not repeat a vomited dose , Disp: , Rfl:     saccharomyces boulardii (FLORASTOR) 250 mg capsule, Take 1 capsule (250 mg total) by mouth 2 (two) times a day, Disp: , Rfl: 0    Sodium Chloride Flush (Normal Saline Flush) 0 9 % SOLN, , Disp: , Rfl:     sodium chloride, PF, 0 9 %, 10 mL by Intracatheter route daily for 30 doses 10cc syringes, Disp: 300 mL, Rfl: 3    traMADol (ULTRAM) 50 mg tablet, Take 1 tablet (50 mg total) by mouth every 6 (six) hours as needed for moderate pain, Disp: 60 tablet, Rfl: 0    venlafaxine (EFFEXOR) 75 mg tablet, Take 75 mg by mouth 3 (three) times a day  , Disp: , Rfl:   No current facility-administered medications for this visit       Facility-Administered Medications Ordered in Other Visits:     INV bevacizumab (INVESTIGATIONAL) 1,358 mg in sodium chloride 0 9 % 45 68 mL IVPB, 1,358 mg, Intravenous, Once, Robert Bello MD    sodium chloride 0 9 % infusion, 20 mL/hr, Intravenous, Continuous, Robert Bello MD, Last Rate: 20 mL/hr at 01/11/21 0820, 20 mL/hr at 01/11/21 0820    Most Recent Lab Results:  Lab Results   Component Value Date    WBC 8 62 01/08/2021    IRON 24 (L) 10/02/2019    TIBC 138 (L) 10/02/2019    FERRITIN 1,042 (H) 10/02/2019    CHOLESTEROL 176 01/08/2021    CHOL 183 10/27/2017    TRIG 88 11/01/2018    HDL 59 11/01/2018    LDLCALC 116 (H) 11/01/2018    ALT 56 01/08/2021    AST 38 01/08/2021    ALB 3 9 01/08/2021     10/27/2017     05/12/2017    SODIUM 134 (L) 01/08/2021    SODIUM 134 (L) 01/04/2021    K 4 2 01/08/2021    K 4 4 01/04/2021     01/08/2021    BUN 22 01/08/2021    BUN 23 01/04/2021    CREATININE 1 03 01/08/2021    CREATININE 0 96 01/04/2021    EGFR 58 01/08/2021    PHOS 3 4 01/08/2021    PHOS 3 4 01/04/2021    GLUCOSE 219 (H) 12/19/2017    POCGLU 97 08/18/2020    GLUF 99 12/18/2020    GLUF 93 11/12/2020    GLUC 135 01/08/2021    HGBA1C 5 1 02/03/2020    HGBA1C 6 2 08/28/2019    HGBA1C 5 5 11/01/2018    CALCIUM 9 4 01/08/2021    FOLATE 5 4 10/06/2019    MG 2 0 01/08/2021     Anthropometric Measurements:   Height: 59"  Ht Readings from Last 1 Encounters:   01/11/21 4' 10 5" (1 486 m)     Wt Readings from Last 20 Encounters:   01/11/21 88 5 kg (195 lb)   01/08/21 89 4 kg (197 lb)   12/21/20 90 3 kg (199 lb)   12/18/20 90 5 kg (199 lb 8 oz)   12/04/20 88 9 kg (196 lb)   11/25/20 89 1 kg (196 lb 8 oz)   11/04/20 87 1 kg (192 lb)   10/14/20 86 4 kg (190 lb 6 4 oz)   08/13/20 85 7 kg (189 lb)   08/06/20 86 9 kg (191 lb 9 6 oz)   08/05/20 88 1 kg (194 lb 3 2 oz)   07/30/20 88 5 kg (195 lb)   06/30/20 88 8 kg (195 lb 12 8 oz)   06/08/20 88 5 kg (195 lb)   05/06/20 83 9 kg (185 lb)   02/04/20 85 5 kg (188 lb 6 4 oz)   02/03/20 85 7 kg (189 lb)   01/31/20 83 9 kg (185 lb)   01/24/20 83 9 kg (185 lb)   01/08/20 83 9 kg (185 lb)   -Varian: (2/25/20) 185 6#, (3/3/20) 188 6#, (3/10/20) 187 2#, (3/19/20) 186 2#, (3/24/20) 187#, (3/31/20) 185 4#, (4/10/20) 184  4#   -Home Scale Wts: (2/19/20) 185#, (8/3/20) 194# - no recent home wts    Estimated body mass index is 40 06 kg/m² as calculated from the following:    Height as of an earlier encounter on 1/11/21: 4' 10 5" (1 486 m)  Weight as of an earlier encounter on 1/11/21: 88 5 kg (195 lb)      Weight Hx:  · Usual Weight: 270# before RNYGB in 2001; lowest post-op wt: 200#; wt typically fluctuates between 215-230#  · Ideal Body Weight: 95#  · Comments: loss of 4 5# (2 3%) in 1 month (not significant) and gain of 4 6#/ (2 4%) in 3 months (not significant)    Oncology Nutrition-Anthropometrics      Most Recent Value   Patient age (years):  63 years   Patient (female) height (in):  61 in   Current Weight to be used for anthropometric calculations (lbs)  195 lbs   Current Weight to be used for anthropometric calculations (kg)  89 kg IBW female:  95 lbs   IBW (kg) female:  43 2 kg   IBW % (female)  205 3 %   Adjusted BW (female):  120 lbs   Weight change after 1 month (lbs)  -4 lbs   Weight change after 3 months (lbs)  5 lbs   Weight change after 1 year (lbs)  10 lbs         Estimated body mass index is 40 06 kg/m² as calculated from the following:    Height as of an earlier encounter on 1/11/21: 4' 10 5" (1 486 m)  Weight as of an earlier encounter on 1/11/21: 88 5 kg (195 lb)  Nutrition-Focused Physical Findings: none observed     Food/Nutrition-Related History & Client/Social History:    Current Nutrition Impact Symptoms:  [x] Nausea -  Mild after infusions [x] Reduced Appetite  [] Acid Reflux   [] Vomiting  [] Unintended Wt Loss  [] Malabsorption    [] Diarrhea  [] Unintended Wt Gain  [] Dumping Syndrome   [x] Constipation - Q2-3 days, to start using Miralax [] Thick Mucous/Secretions  [] Abdominal Pain    [] Dysgeusia (Altered Taste) [] Xerostomia (Dry Mouth) - under control; using Biotene toothpaste + mouthwash [] Gas    [] Dysosmia (Altered Smell)  [] Thrush  [] Difficulty Chewing    [] Oral Mucositis (Sore Mouth)  [x] Fatigue  [] Other:    [] Odynophagia   [] Esophagitis  [] Other:    [] Dysphagia [] Early Satiety  [x] No Problems Eating      Food Allergies: yes: had skin patch testing which showed allergy to strawberries, but is able to eat strawberries without any side effects  Food Intolerances: no    Current Diet: Regular Diet, No Restrictions and Small Frequent Meals   Current Nutrition Intake: Decreased since last visit  Appetite: Good, Fair   Nutrition Route: PO  Meal planning/preparation mainly done by: mother does cooking; sister does shopping  Activity level: Leg pain improved and injection in back  Mainly sedentary  24 Hr Diet Recall: She is trying to make sure to eat at scheduled times and when she is hungry  Has been eating plenty of fruit but admits she needs to work on her vegetable intake    Breakfast: 1/2 bagel with cream cheese  Snacks: grapes  Lunch: 1 pkg [employer] butternut ravioli  Snack: grapes with a [employer] spicy grilled chicken with salad (lettuce, tomatoes, tortilla strips)  Snack: pears with whipped cream    Beverages: water (16 9 oz x2-3), regular unsweetened tea (16 oz x2 + 8 oz - when tea is gone adds water)   -Does not drink coffee or alcohol      -Does not separate fluids from solids, does not have discomfort while eating and drinking at the same time  Supplements:    None       Oncology Nutrition-Estimated Needs      Nutrition from 2020 in [facility]   Weight type used  Adjusted weight   Weight in pounds (lbs) used for estimated needs  119 lbs   Weight in kilograms (kg) used for estimated needs  54 1 kg   Energy needs based on 25 kcal/k kcal   Energy needs based on 30 kcal/k kcal   Protein needs based on 1 g/kg  54 g   Protein needs based on 1 2 g/k g   Fluid needs based on 25 mL/kg  1350 mL   Fluid needs in ounces  46 oz   Fluid needs based on 30 mL/kg  1620 mL   Fluid needs in ounces  55 oz        Discussion & Intervention:   Jimmie Esposito was evaluated today for an RD follow up regarding pt request for dietitian involvement throughout tx  Jimmie Esposito is currently undergoing tx for endometrial cancer  She is doing well today  She has lost a little bit of wt since starting her clinical trial   She notes a slightly reduced appetite but she is pushing herself to eat  She has some nausea after her infusion and has been more constipated recently  Reviewed the importance of wt management throughout the tx process and the role of a high kcal/ high protein diet and a cancer preventative diet in managing wt and overall health    Discussed ways to optimize nutrient intake, suggestions include: eating smaller/more frequent meals, including high protein foods in diet (eggs, chicken, fish, beans/legumes, nuts/nut butters, etc ), including high fiber foods in diet (fruits, vegetables, whole grains, beans, etc ), eating when feeling most hungry and choosing a variety of colorful, plant-based foods  Reviewed 24 hour recall, which revealed an adequate po intake, and discussed ways to increase kcal, protein, and fluid intakes and transition to a cancer preventative diet, suggestions include: adding cheese to foods such as eggs, mashed potatoes, casseroles, etc , making oatmeal with whole milk rather than water, adding nut butter to foods, having a protein source at all meals and snacks, having a fiber source at all meals and snacks, trying new recipes and cooking at home more often  Also discussed: weight management, high protein foods to include at all meals & snacks, high fiber foods to include at all meals & snacks, fiber content of foods, ways to increase overall calorie intake, encouraged eating every 2-3 hours (5-6 small meals/day), MNT for: constipation, recipe suggestions/resources and a cancer preventative eating pattern as a long-term nutrition goal    Moving forward, Karen Canchola plans to and was encouraged to increase her vegetable and fiber intake, continue to hydrate well, make sure she has a protein source at all meals and snacks, and try some new and healthy recipes using the resources provided  We will follow up in ~3 weeks  Materials Provided: emailed pt nutrition rx and recommendations  All questions and concerns addressed during todays visit  Karen Canchola has RD contact information  Nutrition Diagnosis:  Increased Nutrient Needs (kcal & pro) related to increased demand for nutrients and disease state as evidenced by cancer dx and pt undergoing tx for cancer  Altered GI Function related to alteration in GI tract motility as evidenced by Constipation      Monitoring & Evaluation:   Goals:  · weight maintenance/stabilization  · adequate nutrition impact symptom management  · pt to meet >/=75% estimated nutrition needs daily   · Continue lifelong vitamin/mineral supplementation for RNY Gastric Bypass  · Follow a cancer-preventative diet   · Increase vegetable intake  · Increase fiber intake  · Increase protein intake    · Progress Towards Goals: Progressing and new goals added    Barriers: None  Readiness to change: action  Comprehension: verbalizes understanding  Expected Compliance: good      Nutrition Rx & Recommendations:  · Small, frequent meals/snacks may be easier to tolerate than 3 large daily meals  Aim for 5-6 small meals per day (every 2-3 hours)  · Include protein at all meals/snacks  · Follow a Cancer Preventative Nutrition Pattern: colorful, plant-based, low-fat, avoid added sugars, limit alcohol, include high fiber foods, limit processed meats, limit red meat, choose lean protein sources, use low-fat cooking methods, balance calories with physical activity, avoid excessive weight gain throughout life  · Incorporate physical activity as able/allowed  · Fluid Goal: 46-55 oz per day  · Lifetime vitamin/mineral supplementation recommended for Gastric Bypass Surgery:  · Multivitamin BID  · Iron 45 mg daily  · Vitamin D daily  · Calcium Citrate 500 mg TID (do not take with iron, needs to be 2 hrs apart from iron)  · B12 1000 mcg daily    · Resources for recipes: Fitbit, cancerdietitian  com, [employer]/support/nutrition-and-cancer, Skinnytaste  com, www oncologynutrition  org/erfc/recipes, www cookfor[employer]  org, The Essential Cancer Treatment Nutrition Guide and Cookbook: Includes 871 Healthy and Delicious Recipes  Personalized Goals:  1  Increase vegetable intake: peppers, lettuce, carrots, tomatoes, sweet potatoes, butternut squash  2  Increase fiber intake: vegetables, nuts, oatmeal made with milk, fresh fruit, whole grains  3  Make sure you have a protein source at all eating occasions          Follow Up Plan: 2/1/21 during infusion   Recommend Referral to Other Providers: none at this time

## 2021-01-04 NOTE — TELEPHONE ENCOUNTER
Marlys Gilbert left a voice message requesting a follow up appt now that she is getting tx again  Pt stated she will be in the infusion center on 1/11/21 and would like to be seen during that time if possible  Scheduled pt and called pt back to confirm appt date per her request   Marlys Gilbert reports that she is not currently having issues with eating but does note a slightly reduced appetite  She is trying to make sure to eat at scheduled times and when she is hungry  She would like to make sure she stays as healthy as possible throughout tx  Encouragement provided

## 2021-01-05 DIAGNOSIS — C54.1 ENDOMETRIAL CANCER (HCC): Primary | ICD-10-CM

## 2021-01-05 RX ORDER — MIRABEGRON 50 MG/1
TABLET, FILM COATED, EXTENDED RELEASE ORAL
Qty: 30 TABLET | Refills: 5 | Status: SHIPPED | OUTPATIENT
Start: 2021-01-05 | End: 2021-07-12 | Stop reason: SDUPTHER

## 2021-01-08 ENCOUNTER — HOSPITAL ENCOUNTER (OUTPATIENT)
Dept: INFUSION CENTER | Facility: HOSPITAL | Age: 66
Discharge: HOME | End: 2021-01-08
Payer: COMMERCIAL

## 2021-01-08 ENCOUNTER — OFFICE VISIT (OUTPATIENT)
Dept: GYNECOLOGIC ONCOLOGY | Facility: CLINIC | Age: 66
End: 2021-01-08
Payer: COMMERCIAL

## 2021-01-08 ENCOUNTER — DOCUMENTATION (OUTPATIENT)
Dept: OTHER | Facility: HOSPITAL | Age: 66
End: 2021-01-08

## 2021-01-08 VITALS — TEMPERATURE: 98 F

## 2021-01-08 VITALS
TEMPERATURE: 98.2 F | BODY MASS INDEX: 39.72 KG/M2 | WEIGHT: 197 LBS | HEART RATE: 70 BPM | SYSTOLIC BLOOD PRESSURE: 128 MMHG | HEIGHT: 59 IN | DIASTOLIC BLOOD PRESSURE: 80 MMHG

## 2021-01-08 DIAGNOSIS — Z45.2 ENCOUNTER FOR CENTRAL LINE CARE: ICD-10-CM

## 2021-01-08 DIAGNOSIS — C54.1 ENDOMETRIAL CANCER (HCC): Primary | Chronic | ICD-10-CM

## 2021-01-08 DIAGNOSIS — C54.1 ENDOMETRIAL CANCER (HCC): Primary | ICD-10-CM

## 2021-01-08 DIAGNOSIS — K59.03 DRUG INDUCED CONSTIPATION: ICD-10-CM

## 2021-01-08 LAB
ALBUMIN SERPL BCP-MCNC: 3.9 G/DL (ref 3.5–5)
ALP SERPL-CCNC: 197 U/L (ref 46–116)
ALT SERPL W P-5'-P-CCNC: 56 U/L (ref 12–78)
AMYLASE SERPL-CCNC: 25 IU/L (ref 25–115)
ANION GAP SERPL CALCULATED.3IONS-SCNC: 6 MMOL/L (ref 4–13)
APTT PPP: 50 SECONDS (ref 23–37)
AST SERPL W P-5'-P-CCNC: 38 U/L (ref 5–45)
BACTERIA UR QL AUTO: ABNORMAL /HPF
BASOPHILS # BLD AUTO: 0.08 THOUSANDS/ΜL (ref 0–0.1)
BASOPHILS NFR BLD AUTO: 1 % (ref 0–1)
BILIRUB SERPL-MCNC: 0.25 MG/DL (ref 0.2–1)
BILIRUB UR QL STRIP: NEGATIVE
BUN SERPL-MCNC: 22 MG/DL (ref 5–25)
CALCIUM SERPL-MCNC: 9.4 MG/DL (ref 8.3–10.1)
CHLORIDE SERPL-SCNC: 105 MMOL/L (ref 100–108)
CHOLEST SERPL-MCNC: 176 MG/DL (ref 50–200)
CLARITY UR: ABNORMAL
CO2 SERPL-SCNC: 23 MMOL/L (ref 21–32)
COLOR UR: YELLOW
CREAT SERPL-MCNC: 1.03 MG/DL (ref 0.6–1.3)
CREAT UR-MCNC: 15.2 MG/DL
EOSINOPHIL # BLD AUTO: 0.01 THOUSAND/ΜL (ref 0–0.61)
EOSINOPHIL NFR BLD AUTO: 0 % (ref 0–6)
ERYTHROCYTE [DISTWIDTH] IN BLOOD BY AUTOMATED COUNT: 13.6 % (ref 11.6–15.1)
GFR SERPL CREATININE-BSD FRML MDRD: 58 ML/MIN/1.73SQ M
GGT SERPL-CCNC: 10 U/L (ref 5–85)
GLUCOSE SERPL-MCNC: 135 MG/DL (ref 65–140)
GLUCOSE UR STRIP-MCNC: NEGATIVE MG/DL
HCT VFR BLD AUTO: 34.2 % (ref 34.8–46.1)
HGB BLD-MCNC: 10.8 G/DL (ref 11.5–15.4)
HGB UR QL STRIP.AUTO: ABNORMAL
IMM GRANULOCYTES # BLD AUTO: 0.11 THOUSAND/UL (ref 0–0.2)
IMM GRANULOCYTES NFR BLD AUTO: 1 % (ref 0–2)
INR PPP: 1.06 (ref 0.84–1.19)
KETONES UR STRIP-MCNC: NEGATIVE MG/DL
LEUKOCYTE ESTERASE UR QL STRIP: ABNORMAL
LIPASE SERPL-CCNC: 77 U/L (ref 73–393)
LYMPHOCYTES # BLD AUTO: 1.06 THOUSANDS/ΜL (ref 0.6–4.47)
LYMPHOCYTES NFR BLD AUTO: 12 % (ref 14–44)
MAGNESIUM SERPL-MCNC: 2 MG/DL (ref 1.6–2.6)
MCH RBC QN AUTO: 29.5 PG (ref 26.8–34.3)
MCHC RBC AUTO-ENTMCNC: 31.6 G/DL (ref 31.4–37.4)
MCV RBC AUTO: 93 FL (ref 82–98)
MONOCYTES # BLD AUTO: 0.71 THOUSAND/ΜL (ref 0.17–1.22)
MONOCYTES NFR BLD AUTO: 8 % (ref 4–12)
NEUTROPHILS # BLD AUTO: 6.65 THOUSANDS/ΜL (ref 1.85–7.62)
NEUTS SEG NFR BLD AUTO: 78 % (ref 43–75)
NITRITE UR QL STRIP: NEGATIVE
NON-SQ EPI CELLS URNS QL MICRO: ABNORMAL /HPF
NRBC BLD AUTO-RTO: 0 /100 WBCS
OTHER STN SPEC: ABNORMAL
PH UR STRIP.AUTO: 5.5 [PH]
PHOSPHATE SERPL-MCNC: 3.4 MG/DL (ref 2.3–4.1)
PLATELET # BLD AUTO: 344 THOUSANDS/UL (ref 149–390)
PMV BLD AUTO: 10.2 FL (ref 8.9–12.7)
POTASSIUM SERPL-SCNC: 4.2 MMOL/L (ref 3.5–5.3)
PROT SERPL-MCNC: 8.2 G/DL (ref 6.4–8.2)
PROT UR STRIP-MCNC: ABNORMAL MG/DL
PROT UR-MCNC: 18 MG/DL
PROT/CREAT UR: 1.18 MG/G{CREAT} (ref 0–0.1)
PROTHROMBIN TIME: 13.8 SECONDS (ref 11.6–14.5)
RBC # BLD AUTO: 3.66 MILLION/UL (ref 3.81–5.12)
RBC #/AREA URNS AUTO: ABNORMAL /HPF
SODIUM SERPL-SCNC: 134 MMOL/L (ref 136–145)
SP GR UR STRIP.AUTO: 1.01 (ref 1–1.03)
T3 SERPL-MCNC: 1.2 NG/ML (ref 0.6–1.8)
T4 FREE SERPL-MCNC: 1.22 NG/DL (ref 0.76–1.46)
TSH SERPL DL<=0.05 MIU/L-ACNC: 1 UIU/ML (ref 0.36–3.74)
UROBILINOGEN UR QL STRIP.AUTO: 0.2 E.U./DL
WBC # BLD AUTO: 8.62 THOUSAND/UL (ref 4.31–10.16)
WBC #/AREA URNS AUTO: ABNORMAL /HPF

## 2021-01-08 PROCEDURE — 82570 ASSAY OF URINE CREATININE: CPT | Performed by: OBSTETRICS & GYNECOLOGY

## 2021-01-08 PROCEDURE — 85730 THROMBOPLASTIN TIME PARTIAL: CPT

## 2021-01-08 PROCEDURE — 85025 COMPLETE CBC W/AUTO DIFF WBC: CPT

## 2021-01-08 PROCEDURE — 82150 ASSAY OF AMYLASE: CPT

## 2021-01-08 PROCEDURE — 83690 ASSAY OF LIPASE: CPT

## 2021-01-08 PROCEDURE — 81001 URINALYSIS AUTO W/SCOPE: CPT | Performed by: OBSTETRICS & GYNECOLOGY

## 2021-01-08 PROCEDURE — 80053 COMPREHEN METABOLIC PANEL: CPT

## 2021-01-08 PROCEDURE — 82465 ASSAY BLD/SERUM CHOLESTEROL: CPT

## 2021-01-08 PROCEDURE — 84156 ASSAY OF PROTEIN URINE: CPT | Performed by: OBSTETRICS & GYNECOLOGY

## 2021-01-08 PROCEDURE — 82977 ASSAY OF GGT: CPT

## 2021-01-08 PROCEDURE — 1036F TOBACCO NON-USER: CPT | Performed by: NURSE PRACTITIONER

## 2021-01-08 PROCEDURE — 85610 PROTHROMBIN TIME: CPT

## 2021-01-08 PROCEDURE — 84480 ASSAY TRIIODOTHYRONINE (T3): CPT

## 2021-01-08 PROCEDURE — 84439 ASSAY OF FREE THYROXINE: CPT

## 2021-01-08 PROCEDURE — 84100 ASSAY OF PHOSPHORUS: CPT

## 2021-01-08 PROCEDURE — 99214 OFFICE O/P EST MOD 30 MIN: CPT | Performed by: NURSE PRACTITIONER

## 2021-01-08 PROCEDURE — 83735 ASSAY OF MAGNESIUM: CPT

## 2021-01-08 PROCEDURE — 84443 ASSAY THYROID STIM HORMONE: CPT

## 2021-01-08 RX ORDER — SODIUM CHLORIDE 9 MG/ML
20 INJECTION, SOLUTION INTRAVENOUS CONTINUOUS
Status: DISCONTINUED | OUTPATIENT
Start: 2021-01-08 | End: 2021-01-14 | Stop reason: HOSPADM

## 2021-01-08 NOTE — PROGRESS NOTES
Assessment/Plan:    Problem List Items Addressed This Visit        Digestive    Drug induced constipation     D/w patient using Miralax daily while on treatment, or mineral oil 30cc TID if constipation is significant  Genitourinary    Endometrial cancer ([facility] ) - Primary (Chronic)     63year-old with recurrent stage IB endometrial cancer who is s/p cycle 1 of treatment on the [facility] trial  She is feeling well, with her only complaint being constipation  Her performance status is zero  Patient will proceed with treatment on Monday  Labs reviewed by Dr Ezekiel Robbins  She will return to the office as per clinical trial scheduling  CHIEF COMPLAINT:       Problem:  Cancer Staging  Endometrial cancer [facility])  Staging form: Corpus Uteri - Carcinoma, AJCC 7th Edition  - Clinical stage from 12/19/2017: FIGO Stage IB (T1b, N0, M0) - Signed by Tonie Vieyra MD on 2/12/2018        Previous therapy:  Oncology History   Endometrial cancer ([facility] )   11/17/2017 Initial Diagnosis    Endometrial cancer ([facility] )     11/17/2017 Biopsy    ENDOMETRIAL BIOPSY: WELL DIFFERENTIATED ENDOMETRIAL ADENOCARCINOMA (FIGO I) WITH FOCALMUCINOUS FEATURES  Part B: ENDOCERVICAL POLYP:BENIGN ENDOCERVICAL      12/19/2017 Surgery    Robotic assisted total laparoscopic hysterectomy with bilateral salpingo-oophorectomy and sentinel bilateral pelvic lymph node dissection  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 (4 4 x 3 2 cm tumor, 9 4/15 4mm invasion, NO LVSI, washings revealed atypical cellular changes)     12/19/2017 Genetic Testing    Morrison testing negative     6/28/2019 Biopsy    A  Breast, Right, US BX Right Breast 1000 4 cmfn:  - Benign breast tissue with focal histiocytic aggregate  See comment   - Negative for atypia and in-situ or invasive carcinoma       7/8/2019 Recurrence    Presented with right lower extremity DVT and CT demonstrating right pelvic sidewall mass with venous, ureteral and nerve compression causing significant neuropathic pain  Biopsy:  Lymph Node, Right pelvic lymph node x3:  - High-grade adenocarcinoma  7/30/2019 - 1/6/2020 Chemotherapy    Taxol 175 mg/m2 and carboplatin AUC 6 every 21 days  Dose was reduced to taxol 135 mg/m2 and carboplatin AUC 5  Completed 6 cycles  Treatment protracted due to multiple hospital admissions  2/24/2020 - 4/13/2020 Radiation    adjuvant external beam radiation therapy to the whole pelvis to 4500 cGy followed by boost to gross disease of an additional 2200 cGy     11/23/2020 Progression    New necrotic adenopathy in the retroperitoneum on CT      12/21/2020 -  Chemotherapy    Began chemotherapy with rucaparib, atezolizumab, and bevacizumab as per [facility] clinical trial            Patient ID: Manuel Harrell is a 63 y o  female     Chantel Edge has been tolerating chemotherapy well  She has been afebrile  She denies nausea or vomiting  Her appetite is appropriate  She is voiding without difficulty  Notes constipation; she has been taking colace daily  She denies abdominal or pelvic pain changed from her baseline  The patient is without vaginal bleeding or discharge  She denies chemotherapy-induced neuropathy  She is ambulatory  The following portions of the patient's history were reviewed and updated as appropriate: allergies, current medications, past family history, past medical history, past social history, past surgical history and problem list     Review of Systems   Constitutional: Negative for activity change, appetite change, chills, fatigue, fever and unexpected weight change  HENT: Negative for mouth sores  Eyes: Negative  Respiratory: Negative for cough, chest tightness, shortness of breath and wheezing  Cardiovascular: Negative for chest pain, palpitations and leg swelling  Gastrointestinal: Positive for constipation   Negative for abdominal distention, abdominal pain, anal bleeding, blood in stool, diarrhea, nausea and vomiting  Endocrine: Negative  Genitourinary: Negative for difficulty urinating, dysuria, flank pain, frequency, hematuria, pelvic pain, urgency, vaginal bleeding, vaginal discharge and vaginal pain  Musculoskeletal: Negative for arthralgias and myalgias  Skin: Negative for color change, pallor and rash  Neurological: Negative for dizziness, weakness, numbness and headaches  Hematological: Negative  Psychiatric/Behavioral: The patient is not nervous/anxious          Current Outpatient Medications   Medication Sig Dispense Refill    acetaminophen (TYLENOL) 325 mg tablet Take 2 tablets (650 mg total) by mouth every 6 (six) hours as needed for mild pain, headaches or fever 30 tablet 0    ARIPiprazole (ABILIFY) 10 mg tablet Take 10 mg by mouth daily      aspirin (ECOTRIN LOW STRENGTH) 81 mg EC tablet Take 81 mg by mouth daily      Calcium-Magnesium-Vitamin D (CALCIUM 500 PO) Take by mouth daily       cholecalciferol (VITAMIN D3) 1,000 units tablet Take 1,000 Units by mouth daily      Cyanocobalamin (VITAMIN B12 PO) Take by mouth daily       cyclobenzaprine (FLEXERIL) 10 mg tablet Take 1 tablet (10 mg total) by mouth daily at bedtime as needed for muscle spasms 30 tablet 1    enoxaparin (LOVENOX) 120 mg/0 8 mL INJECT 0 8 ML (120 MG TOTAL) UNDER THE SKIN DAILY 30 Syringe 3    folic acid (FOLVITE) 1 mg tablet Take 1 tablet (1 mg total) by mouth daily  0    gabapentin (NEURONTIN) 300 mg capsule Take 1 capsule (300 mg total) by mouth 2 (two) times a day 60 capsule 1    gemfibrozil (LOPID) 600 mg tablet TAKE 1 TABLET BY MOUTH EVERY DAY 90 tablet 1    Multiple Vitamin (MULTIVITAMIN) tablet Take 1 tablet by mouth daily      Myrbetriq 50 MG TB24 TAKE 1 TABLET BY MOUTH EVERY DAY 30 tablet 5    omeprazole (PriLOSEC) 20 mg delayed release capsule Take 20 mg by mouth daily      ondansetron (ZOFRAN) 4 mg tablet Take 1 tablet (4 mg total) by mouth every 8 (eight) hours as needed for nausea or vomiting 30 tablet 0    oxybutynin (DITROPAN XL) 15 MG 24 hr tablet Take 1 tablet (15 mg total) by mouth daily at bedtime 90 tablet 3    quinapril (ACCUPRIL) 5 mg tablet TAKE 1 TABLET BY MOUTH EVERYDAY AT BEDTIME 90 tablet 1    rucaparib (RUBRACA) 300 mg tablet Take 600 mg by mouth every 12 (twelve) hours Take with or without food  Do not repeat a vomited dose   saccharomyces boulardii (FLORASTOR) 250 mg capsule Take 1 capsule (250 mg total) by mouth 2 (two) times a day  0    Sodium Chloride Flush (Normal Saline Flush) 0 9 % SOLN       sodium chloride, PF, 0 9 % 10 mL by Intracatheter route daily for 30 doses 10cc syringes 300 mL 3    traMADol (ULTRAM) 50 mg tablet Take 1 tablet (50 mg total) by mouth every 6 (six) hours as needed for moderate pain 60 tablet 0    venlafaxine (EFFEXOR) 75 mg tablet Take 75 mg by mouth 3 (three) times a day         No current facility-administered medications for this visit  Objective:    Blood pressure 128/80, pulse 70, temperature 98 2 °F (36 8 °C), temperature source Tympanic, height 4' 10 5" (1 486 m), weight 89 4 kg (197 lb), not currently breastfeeding  Body mass index is 40 47 kg/m²  Body surface area is 1 82 meters squared  Physical Exam  Vitals signs reviewed  Constitutional:       General: She is not in acute distress  Appearance: Normal appearance  She is not ill-appearing  HENT:      Head: Normocephalic and atraumatic  Mouth/Throat:      Mouth: Mucous membranes are moist    Eyes:      General: No scleral icterus  Right eye: No discharge  Left eye: No discharge  Conjunctiva/sclera: Conjunctivae normal    Pulmonary:      Effort: Pulmonary effort is normal    Musculoskeletal:      Right lower leg: No edema  Left lower leg: No edema  Skin:     General: Skin is warm and dry  Coloration: Skin is not jaundiced  Findings: No rash  Neurological:      General: No focal deficit present        Mental Status: She is alert and oriented to person, place, and time  Cranial Nerves: No cranial nerve deficit  Sensory: No sensory deficit  Motor: No weakness  Gait: Gait normal    Psychiatric:         Mood and Affect: Mood normal          Behavior: Behavior normal          Thought Content:  Thought content normal          Judgment: Judgment normal          No results found for:   Lab Results   Component Value Date     10/27/2017    K 4 4 01/04/2021     01/04/2021    CO2 19 (L) 01/04/2021    BUN 23 01/04/2021    CREATININE 0 96 01/04/2021    GLUCOSE 219 (H) 12/19/2017    GLUF 99 12/18/2020    CALCIUM 8 9 01/04/2021    CORRECTEDCA 10 2 (H) 12/09/2019    AST 16 01/04/2021    ALT 21 01/04/2021    ALKPHOS 178 (H) 01/04/2021    PROT 6 9 10/27/2017    BILITOT 0 3 10/27/2017    EGFR 63 01/04/2021     Lab Results   Component Value Date    WBC 7 62 01/04/2021    HGB 10 6 (L) 01/04/2021    HCT 34 0 (L) 01/04/2021    MCV 94 01/04/2021     01/04/2021     Lab Results   Component Value Date    NEUTROABS 5 58 01/04/2021        Trend:  No results found for:

## 2021-01-08 NOTE — ASSESSMENT & PLAN NOTE
42-year-old with recurrent stage IB endometrial cancer who is s/p cycle 1 of treatment on the [facility] trial  She is feeling well, with her only complaint being constipation  Her performance status is zero  Patient will proceed with treatment on Monday  Labs reviewed by Dr Reggie Odonnell  She will return to the office as per clinical trial scheduling

## 2021-01-08 NOTE — PROGRESS NOTES
Port flushed and labs drawn  Verified no additional labs were needed at this time with clinical trial coordinator, Alesia Wright   Pt aware of future appt, declined AVS

## 2021-01-08 NOTE — ASSESSMENT & PLAN NOTE
D/w patient using Miralax daily while on treatment, or mineral oil 30cc TID if constipation is significant

## 2021-01-08 NOTE — PROGRESS NOTES
Patient was seen for Cycle 2 Day 1 pre-treatment office visit with Tenisha Davis  AEs and conmeds were reviewed  Patient reported having constipation since 1/5, and reported taking Colace stool softener on 1/7 to help with symptom  It was recommended to the patient to take Miralax twice daily in order to help with symptoms  Patient reported shortness of breath upon exertion, nausea without vomiting, and occasional heartburn when ingesting the oral medication, Rucaparib  She reported that the heartburn does seem to go away when she takes the pill with food  Patient reported feeling well otherwise, and is scheduled for cycle 2 day 1 labs today, 1/8/21  Patient was made aware of future appointments, and was told to call with any questions or concerns

## 2021-01-11 ENCOUNTER — NUTRITION (OUTPATIENT)
Dept: NUTRITION | Facility: CLINIC | Age: 66
End: 2021-01-11

## 2021-01-11 ENCOUNTER — DOCUMENTATION (OUTPATIENT)
Dept: OTHER | Facility: HOSPITAL | Age: 66
End: 2021-01-11

## 2021-01-11 ENCOUNTER — HOSPITAL ENCOUNTER (OUTPATIENT)
Dept: INFUSION CENTER | Facility: CLINIC | Age: 66
Discharge: HOME | End: 2021-01-11
Payer: COMMERCIAL

## 2021-01-11 VITALS
RESPIRATION RATE: 18 BRPM | DIASTOLIC BLOOD PRESSURE: 76 MMHG | HEIGHT: 59 IN | TEMPERATURE: 97.1 F | SYSTOLIC BLOOD PRESSURE: 108 MMHG | WEIGHT: 195 LBS | BODY MASS INDEX: 39.31 KG/M2 | OXYGEN SATURATION: 99 % | HEART RATE: 61 BPM

## 2021-01-11 DIAGNOSIS — Z71.3 NUTRITIONAL COUNSELING: Primary | ICD-10-CM

## 2021-01-11 PROCEDURE — 96413 CHEMO IV INFUSION 1 HR: CPT

## 2021-01-11 PROCEDURE — J9999Q0 INV ATEZOLIZUMAB 1200MG/20ML 20 ML: Performed by: OBSTETRICS & GYNECOLOGY

## 2021-01-11 PROCEDURE — 96417 CHEMO IV INFUS EACH ADDL SEQ: CPT

## 2021-01-11 PROCEDURE — J9035Q0 INV BEVACIZUMAB 400MG/16ML 16 ML: Performed by: OBSTETRICS & GYNECOLOGY

## 2021-01-11 RX ADMIN — SODIUM CHLORIDE 20 ML/HR: 0.9 INJECTION, SOLUTION INTRAVENOUS at 08:20

## 2021-01-11 RX ADMIN — Medication 1200 MG: at 09:28

## 2021-01-11 RX ADMIN — Medication 1358 MG: at 10:18

## 2021-01-11 NOTE — PLAN OF CARE
Problem: Potential for Falls  Goal: Patient will remain free of falls  Description: INTERVENTIONS:  - Assess patient frequently for physical needs  -  Identify cognitive and physical deficits and behaviors that affect risk of falls  -  [place] fall precautions as indicated by assessment   - Educate patient/family on patient safety including physical limitations  - Instruct patient to call for assistance with activity based on assessment  - Modify environment to reduce risk of injury  - Consider OT/PT consult to assist with strengthening/mobility  Outcome: Progressing     Problem: Knowledge Deficit  Goal: Patient/family/caregiver demonstrates understanding of disease process, treatment plan, medications, and discharge instructions  Description: Complete learning assessment and assess knowledge base    Interventions:  - Provide teaching at level of understanding  - Provide teaching via preferred learning methods  Outcome: Progressing

## 2021-01-11 NOTE — PROGRESS NOTES
Pt to clinic for atezolizumab and bevacizumab clinical trial  Received new order for shortened times for cycle two   Pt offers no complaints at this time, will continue to monitor

## 2021-01-11 NOTE — PROGRESS NOTES
Patient was seen in the [facility] for Cycle 2 Day 1 treatment  Patient AEs and Conmeds were reviewed  Patient reported no changes to either  Patient reported that constipation has since resolved, but will take Miralax as suggested by the [address] [place] [address] providers  Patient was given new pill diary and new Cycle 2 Rucaparib oral medication bottles  Patient returned one of the two Rucaparib bottles from Cycle 1 as she has accidentally disposed of the empty bottle  Patient returned drug diary  Patient was made aware of future appointments  Patient tolerated treatment without incident

## 2021-01-11 NOTE — PATIENT INSTRUCTIONS
Nutrition Rx & Recommendations:  · Small, frequent meals/snacks may be easier to tolerate than 3 large daily meals  Aim for 5-6 small meals per day (every 2-3 hours)  · Include protein at all meals/snacks  · Follow a Cancer Preventative Nutrition Pattern: colorful, plant-based, low-fat, avoid added sugars, limit alcohol, include high fiber foods, limit processed meats, limit red meat, choose lean protein sources, use low-fat cooking methods, balance calories with physical activity, avoid excessive weight gain throughout life  · Incorporate physical activity as able/allowed  · Fluid Goal: 46-55 oz per day  · Lifetime vitamin/mineral supplementation recommended for Gastric Bypass Surgery:  · Multivitamin BID  · Iron 45 mg daily  · Vitamin D daily  · Calcium Citrate 500 mg TID (do not take with iron, needs to be 2 hrs apart from iron)  · B12 1000 mcg daily    · Resources for recipes: Streamfile, cancerdietitian  com, oncDatabraid org/support/nutrition-and-cancer, Skinnytaste  com, www oncologynutrition  org/erfc/recipes, www cookforyourlife  org, The Essential Cancer Treatment Nutrition Guide and Cookbook: Includes 923 Healthy and Delicious Recipes  Personalized Goals:  1  Increase vegetable intake: peppers, lettuce, carrots, tomatoes, sweet potatoes, butternut squash  2  Increase fiber intake: vegetables, nuts, oatmeal made with milk, fresh fruit, whole grains  3  Make sure you have a protein source at all eating occasions          Follow Up Plan: 2/1/21 during infusion   Recommend Referral to Other Providers: none at this time

## 2021-01-12 DIAGNOSIS — C54.1 ENDOMETRIAL CANCER (HCC): Primary | ICD-10-CM

## 2021-01-18 ENCOUNTER — DOCUMENTATION (OUTPATIENT)
Dept: OTHER | Facility: HOSPITAL | Age: 66
End: 2021-01-18

## 2021-01-18 ENCOUNTER — HOSPITAL ENCOUNTER (OUTPATIENT)
Dept: INFUSION CENTER | Facility: CLINIC | Age: 66
Discharge: HOME | End: 2021-01-18
Payer: COMMERCIAL

## 2021-01-18 VITALS — TEMPERATURE: 97.2 F

## 2021-01-18 DIAGNOSIS — Z45.2 ENCOUNTER FOR CENTRAL LINE CARE: Primary | ICD-10-CM

## 2021-01-18 DIAGNOSIS — C54.1 ENDOMETRIAL CANCER (HCC): ICD-10-CM

## 2021-01-18 LAB
ALBUMIN SERPL BCP-MCNC: 4 G/DL (ref 3.5–5)
ALP SERPL-CCNC: 174 U/L (ref 46–116)
ALT SERPL W P-5'-P-CCNC: 30 U/L (ref 12–78)
ANION GAP SERPL CALCULATED.3IONS-SCNC: 12 MMOL/L (ref 4–13)
AST SERPL W P-5'-P-CCNC: 17 U/L (ref 5–45)
BILIRUB SERPL-MCNC: 0.2 MG/DL (ref 0.2–1)
BUN SERPL-MCNC: 26 MG/DL (ref 5–25)
CALCIUM SERPL-MCNC: 9.3 MG/DL (ref 8.3–10.1)
CHLORIDE SERPL-SCNC: 103 MMOL/L (ref 100–108)
CO2 SERPL-SCNC: 21 MMOL/L (ref 21–32)
CREAT SERPL-MCNC: 1.12 MG/DL (ref 0.6–1.3)
GFR SERPL CREATININE-BSD FRML MDRD: 52 ML/MIN/1.73SQ M
GLUCOSE SERPL-MCNC: 121 MG/DL (ref 65–140)
MAGNESIUM SERPL-MCNC: 2.1 MG/DL (ref 1.6–2.6)
PHOSPHATE SERPL-MCNC: 3.5 MG/DL (ref 2.3–4.1)
POTASSIUM SERPL-SCNC: 4.6 MMOL/L (ref 3.5–5.3)
PROT SERPL-MCNC: 8.5 G/DL (ref 6.4–8.2)
SODIUM SERPL-SCNC: 136 MMOL/L (ref 136–145)

## 2021-01-18 PROCEDURE — 80053 COMPREHEN METABOLIC PANEL: CPT

## 2021-01-18 PROCEDURE — 84100 ASSAY OF PHOSPHORUS: CPT

## 2021-01-18 PROCEDURE — 83735 ASSAY OF MAGNESIUM: CPT

## 2021-01-18 NOTE — PROGRESS NOTES
Patient is here for central labs  Labs drawn per dr's orders via her port  Patient complains of constipation and states she is taking miralax and senna and she has not gone since Friday  Patient states she did not take miralax today and will when she gets home because she did not want it to work while she is out of the house with her appointments  patient instructed to call the dr if she does not go today and she verbalized understanding   Next appointment confirmed and she declined avs

## 2021-01-18 NOTE — PROGRESS NOTES
Patient was seen at [address] [place] [address] for her Cycle 2 Day 8 blood draw  This encounter was performed according to protocol  Patient AEs and Conmeds were reviewed  Patient stated that she is experiencing intermittent constipation, and has continued to take Miralax daily since beginning on [id number]  Patient confirmed that fatigue is still present despite rest  Patient reported nausea and has been taking Zofran as needed  Patient told to call with any questions or concerns

## 2021-01-18 NOTE — PLAN OF CARE
Problem: Potential for Falls  Goal: Patient will remain free of falls  Description: INTERVENTIONS:  - Assess patient frequently for physical needs  -  Identify cognitive and physical deficits and behaviors that affect risk of falls  -  [place] fall precautions as indicated by assessment   - Educate patient/family on patient safety including physical limitations  - Instruct patient to call for assistance with activity based on assessment  - Modify environment to reduce risk of injury  - Consider OT/PT consult to assist with strengthening/mobility  Outcome: Progressing     Problem: Knowledge Deficit  Goal: Patient/family/caregiver demonstrates understanding of disease process, treatment plan, medications, and discharge instructions  Description: Complete learning assessment and assess knowledge base    Interventions:  - Provide teaching at level of understanding  - Provide teaching via preferred learning methods  Outcome: Progressing

## 2021-01-19 ENCOUNTER — HOSPITAL ENCOUNTER (OUTPATIENT)
Dept: INFUSION CENTER | Facility: CLINIC | Age: 66
Discharge: HOME | End: 2021-01-19
Payer: COMMERCIAL

## 2021-01-19 VITALS — TEMPERATURE: 96.8 F

## 2021-01-19 DIAGNOSIS — Z45.2 ENCOUNTER FOR CENTRAL LINE CARE: Primary | ICD-10-CM

## 2021-01-19 DIAGNOSIS — C54.1 ENDOMETRIAL CANCER (HCC): ICD-10-CM

## 2021-01-19 LAB
BASOPHILS # BLD AUTO: 0.07 THOUSANDS/ΜL (ref 0–0.1)
BASOPHILS NFR BLD AUTO: 1 % (ref 0–1)
EOSINOPHIL # BLD AUTO: 0.01 THOUSAND/ΜL (ref 0–0.61)
EOSINOPHIL NFR BLD AUTO: 0 % (ref 0–6)
ERYTHROCYTE [DISTWIDTH] IN BLOOD BY AUTOMATED COUNT: 13.9 % (ref 11.6–15.1)
HCT VFR BLD AUTO: 36.3 % (ref 34.8–46.1)
HGB BLD-MCNC: 11.3 G/DL (ref 11.5–15.4)
IMM GRANULOCYTES # BLD AUTO: 0.09 THOUSAND/UL (ref 0–0.2)
IMM GRANULOCYTES NFR BLD AUTO: 1 % (ref 0–2)
LYMPHOCYTES # BLD AUTO: 0.97 THOUSANDS/ΜL (ref 0.6–4.47)
LYMPHOCYTES NFR BLD AUTO: 12 % (ref 14–44)
MCH RBC QN AUTO: 29.4 PG (ref 26.8–34.3)
MCHC RBC AUTO-ENTMCNC: 31.1 G/DL (ref 31.4–37.4)
MCV RBC AUTO: 94 FL (ref 82–98)
MONOCYTES # BLD AUTO: 0.85 THOUSAND/ΜL (ref 0.17–1.22)
MONOCYTES NFR BLD AUTO: 11 % (ref 4–12)
NEUTROPHILS # BLD AUTO: 5.82 THOUSANDS/ΜL (ref 1.85–7.62)
NEUTS SEG NFR BLD AUTO: 75 % (ref 43–75)
NRBC BLD AUTO-RTO: 0 /100 WBCS
PLATELET # BLD AUTO: 325 THOUSANDS/UL (ref 149–390)
PMV BLD AUTO: 9.7 FL (ref 8.9–12.7)
RBC # BLD AUTO: 3.85 MILLION/UL (ref 3.81–5.12)
WBC # BLD AUTO: 7.81 THOUSAND/UL (ref 4.31–10.16)

## 2021-01-19 PROCEDURE — 85025 COMPLETE CBC W/AUTO DIFF WBC: CPT | Performed by: OBSTETRICS & GYNECOLOGY

## 2021-01-19 NOTE — PROGRESS NOTES
Pt to clinic for redraw for cbc, pt tolerated without complications, aware of next appointment, declined avs

## 2021-01-19 NOTE — PLAN OF CARE
Problem: Potential for Falls  Goal: Patient will remain free of falls  Description: INTERVENTIONS:  - Assess patient frequently for physical needs  -  Identify cognitive and physical deficits and behaviors that affect risk of falls  -  [place] fall precautions as indicated by assessment   - Educate patient/family on patient safety including physical limitations  - Instruct patient to call for assistance with activity based on assessment  - Modify environment to reduce risk of injury  - Consider OT/PT consult to assist with strengthening/mobility  Outcome: Progressing     Problem: Knowledge Deficit  Goal: Patient/family/caregiver demonstrates understanding of disease process, treatment plan, medications, and discharge instructions  Description: Complete learning assessment and assess knowledge base    Interventions:  - Provide teaching at level of understanding  - Provide teaching via preferred learning methods  Outcome: Progressing

## 2021-01-20 DIAGNOSIS — C54.1 ENDOMETRIAL CANCER (HCC): Primary | ICD-10-CM

## 2021-01-25 ENCOUNTER — HOSPITAL ENCOUNTER (OUTPATIENT)
Dept: INFUSION CENTER | Facility: CLINIC | Age: 66
Discharge: HOME | End: 2021-01-25
Payer: COMMERCIAL

## 2021-01-25 ENCOUNTER — DOCUMENTATION (OUTPATIENT)
Dept: OTHER | Facility: HOSPITAL | Age: 66
End: 2021-01-25

## 2021-01-25 VITALS — TEMPERATURE: 97 F

## 2021-01-25 DIAGNOSIS — C54.1 ENDOMETRIAL CANCER (HCC): Primary | ICD-10-CM

## 2021-01-25 DIAGNOSIS — Z45.2 ENCOUNTER FOR CENTRAL LINE CARE: Primary | ICD-10-CM

## 2021-01-25 DIAGNOSIS — C54.1 ENDOMETRIAL CANCER (HCC): ICD-10-CM

## 2021-01-25 LAB
ALBUMIN SERPL BCP-MCNC: 3.7 G/DL (ref 3.5–5)
ALP SERPL-CCNC: 161 U/L (ref 46–116)
ALT SERPL W P-5'-P-CCNC: 33 U/L (ref 12–78)
ANION GAP SERPL CALCULATED.3IONS-SCNC: 11 MMOL/L (ref 4–13)
AST SERPL W P-5'-P-CCNC: 17 U/L (ref 5–45)
BASOPHILS # BLD AUTO: 0.09 THOUSANDS/ΜL (ref 0–0.1)
BASOPHILS NFR BLD AUTO: 1 % (ref 0–1)
BILIRUB SERPL-MCNC: 0.25 MG/DL (ref 0.2–1)
BUN SERPL-MCNC: 21 MG/DL (ref 5–25)
CALCIUM SERPL-MCNC: 9.5 MG/DL (ref 8.3–10.1)
CHLORIDE SERPL-SCNC: 102 MMOL/L (ref 100–108)
CO2 SERPL-SCNC: 23 MMOL/L (ref 21–32)
CREAT SERPL-MCNC: 1.05 MG/DL (ref 0.6–1.3)
EOSINOPHIL # BLD AUTO: 0.02 THOUSAND/ΜL (ref 0–0.61)
EOSINOPHIL NFR BLD AUTO: 0 % (ref 0–6)
ERYTHROCYTE [DISTWIDTH] IN BLOOD BY AUTOMATED COUNT: 14 % (ref 11.6–15.1)
GFR SERPL CREATININE-BSD FRML MDRD: 57 ML/MIN/1.73SQ M
GLUCOSE SERPL-MCNC: 98 MG/DL (ref 65–140)
HCT VFR BLD AUTO: 34.9 % (ref 34.8–46.1)
HGB BLD-MCNC: 11 G/DL (ref 11.5–15.4)
IMM GRANULOCYTES # BLD AUTO: 0.12 THOUSAND/UL (ref 0–0.2)
IMM GRANULOCYTES NFR BLD AUTO: 1 % (ref 0–2)
LYMPHOCYTES # BLD AUTO: 0.97 THOUSANDS/ΜL (ref 0.6–4.47)
LYMPHOCYTES NFR BLD AUTO: 10 % (ref 14–44)
MAGNESIUM SERPL-MCNC: 1.9 MG/DL (ref 1.6–2.6)
MCH RBC QN AUTO: 29.6 PG (ref 26.8–34.3)
MCHC RBC AUTO-ENTMCNC: 31.5 G/DL (ref 31.4–37.4)
MCV RBC AUTO: 94 FL (ref 82–98)
MONOCYTES # BLD AUTO: 1.01 THOUSAND/ΜL (ref 0.17–1.22)
MONOCYTES NFR BLD AUTO: 10 % (ref 4–12)
NEUTROPHILS # BLD AUTO: 7.82 THOUSANDS/ΜL (ref 1.85–7.62)
NEUTS SEG NFR BLD AUTO: 78 % (ref 43–75)
NRBC BLD AUTO-RTO: 0 /100 WBCS
PHOSPHATE SERPL-MCNC: 3.8 MG/DL (ref 2.3–4.1)
PLATELET # BLD AUTO: 334 THOUSANDS/UL (ref 149–390)
PMV BLD AUTO: 9.5 FL (ref 8.9–12.7)
POTASSIUM SERPL-SCNC: 4.2 MMOL/L (ref 3.5–5.3)
PROT SERPL-MCNC: 7.9 G/DL (ref 6.4–8.2)
RBC # BLD AUTO: 3.71 MILLION/UL (ref 3.81–5.12)
SODIUM SERPL-SCNC: 136 MMOL/L (ref 136–145)
WBC # BLD AUTO: 10.03 THOUSAND/UL (ref 4.31–10.16)

## 2021-01-25 PROCEDURE — 85025 COMPLETE CBC W/AUTO DIFF WBC: CPT

## 2021-01-25 PROCEDURE — 84100 ASSAY OF PHOSPHORUS: CPT

## 2021-01-25 PROCEDURE — 83735 ASSAY OF MAGNESIUM: CPT

## 2021-01-25 PROCEDURE — 80053 COMPREHEN METABOLIC PANEL: CPT

## 2021-01-25 NOTE — PROGRESS NOTES
Patient was seen at [address] [place] [address] for Cycle 2 Day 15 lab draw  Encounter was followed as per protocol  Patient AEs and ConMeds were reviewed; patient reported that she she is still experiencing fatigue and takes naps daily; she reported that it is relieved when she rests and lies down  Patient reported that her shortness of breath has improved but is still present  Patient reported a small red, itchy, rash confined to her upper chest area that began on 1/20/21  She has been applying cortisone cream to the area  Patient reported that her nausea has decreased, and noted that her Omeprazole is helping in the morning, and would like to know if she can take this at night too along with her evening dose of the study drug, Rucaparib  Patient reported that constipation stopped on 1/20/21 after using mineral oil from 1/19/21 to 1/21/21  Patient reported that she has one to two bowel movements a day and continues to take Miralax daily  Patient was made aware of upcoming appointments, and told to call with any questions or concerns

## 2021-01-25 NOTE — PROGRESS NOTES
Pt here for central labs  Pt reports that she has had a small, itchy rash on her chest since 1/20 after her chemotherapy infusion  Rash is confined to upper chest, small and red  Pt reports that she has been using hydrocortisone cream and benadryl cream which has been relieving the itching  Notified Leslie Ruvalcaba and Eugenia Lacey from clinical trials  Labs drawn and sent to the lab  Port flushed per protocol  Aware of next appointment at [facility] for lab draw   AVS declined

## 2021-01-27 ENCOUNTER — TELEPHONE (OUTPATIENT)
Dept: GYNECOLOGIC ONCOLOGY | Facility: HOSPITAL | Age: 66
End: 2021-01-27

## 2021-01-29 ENCOUNTER — DOCUMENTATION (OUTPATIENT)
Dept: OTHER | Facility: HOSPITAL | Age: 66
End: 2021-01-29

## 2021-01-29 ENCOUNTER — OFFICE VISIT (OUTPATIENT)
Dept: GYNECOLOGIC ONCOLOGY | Facility: CLINIC | Age: 66
End: 2021-01-29
Payer: COMMERCIAL

## 2021-01-29 ENCOUNTER — HOSPITAL ENCOUNTER (OUTPATIENT)
Dept: INFUSION CENTER | Facility: HOSPITAL | Age: 66
Discharge: HOME | End: 2021-01-29
Payer: COMMERCIAL

## 2021-01-29 ENCOUNTER — TELEPHONE (OUTPATIENT)
Dept: NUTRITION | Facility: CLINIC | Age: 66
End: 2021-01-29

## 2021-01-29 VITALS
TEMPERATURE: 98.6 F | RESPIRATION RATE: 18 BRPM | SYSTOLIC BLOOD PRESSURE: 140 MMHG | BODY MASS INDEX: 39.31 KG/M2 | WEIGHT: 195 LBS | HEIGHT: 59 IN | DIASTOLIC BLOOD PRESSURE: 80 MMHG | HEART RATE: 78 BPM

## 2021-01-29 DIAGNOSIS — C54.1 ENDOMETRIAL CANCER (HCC): Primary | ICD-10-CM

## 2021-01-29 DIAGNOSIS — C54.1 ENDOMETRIAL CANCER (HCC): Chronic | ICD-10-CM

## 2021-01-29 DIAGNOSIS — K08.9 DENTAL DISORDER: Primary | ICD-10-CM

## 2021-01-29 DIAGNOSIS — Z45.2 ENCOUNTER FOR CENTRAL LINE CARE: ICD-10-CM

## 2021-01-29 DIAGNOSIS — I82.4Y1 ACUTE DEEP VEIN THROMBOSIS (DVT) OF PROXIMAL VEIN OF RIGHT LOWER EXTREMITY (HCC): ICD-10-CM

## 2021-01-29 DIAGNOSIS — Z93.6 NEPHROSTOMY STATUS (HCC): ICD-10-CM

## 2021-01-29 DIAGNOSIS — K59.03 DRUG INDUCED CONSTIPATION: ICD-10-CM

## 2021-01-29 LAB
ALBUMIN SERPL BCP-MCNC: 3.7 G/DL (ref 3.5–5)
ALP SERPL-CCNC: 181 U/L (ref 46–116)
ALT SERPL W P-5'-P-CCNC: 51 U/L (ref 12–78)
AMYLASE SERPL-CCNC: 24 IU/L (ref 25–115)
ANION GAP SERPL CALCULATED.3IONS-SCNC: 7 MMOL/L (ref 4–13)
APTT PPP: 52 SECONDS (ref 23–37)
AST SERPL W P-5'-P-CCNC: 34 U/L (ref 5–45)
BACTERIA UR QL AUTO: ABNORMAL /HPF
BASOPHILS # BLD AUTO: 0.04 THOUSANDS/ΜL (ref 0–0.1)
BASOPHILS NFR BLD AUTO: 0 % (ref 0–1)
BILIRUB SERPL-MCNC: 0.36 MG/DL (ref 0.2–1)
BILIRUB UR QL STRIP: NEGATIVE
BUN SERPL-MCNC: 15 MG/DL (ref 5–25)
CALCIUM SERPL-MCNC: 10 MG/DL (ref 8.3–10.1)
CHLORIDE SERPL-SCNC: 108 MMOL/L (ref 100–108)
CHOLEST SERPL-MCNC: 160 MG/DL (ref 50–200)
CLARITY UR: ABNORMAL
CO2 SERPL-SCNC: 22 MMOL/L (ref 21–32)
COLOR UR: YELLOW
CREAT SERPL-MCNC: 0.96 MG/DL (ref 0.6–1.3)
CREAT UR-MCNC: 53.5 MG/DL
EOSINOPHIL # BLD AUTO: 0.02 THOUSAND/ΜL (ref 0–0.61)
EOSINOPHIL NFR BLD AUTO: 0 % (ref 0–6)
ERYTHROCYTE [DISTWIDTH] IN BLOOD BY AUTOMATED COUNT: 13.9 % (ref 11.6–15.1)
GFR SERPL CREATININE-BSD FRML MDRD: 63 ML/MIN/1.73SQ M
GGT SERPL-CCNC: 8 U/L (ref 5–85)
GLUCOSE SERPL-MCNC: 108 MG/DL (ref 65–140)
GLUCOSE UR STRIP-MCNC: NEGATIVE MG/DL
HCT VFR BLD AUTO: 32.9 % (ref 34.8–46.1)
HGB BLD-MCNC: 10.5 G/DL (ref 11.5–15.4)
HGB UR QL STRIP.AUTO: ABNORMAL
IMM GRANULOCYTES # BLD AUTO: 0.09 THOUSAND/UL (ref 0–0.2)
IMM GRANULOCYTES NFR BLD AUTO: 1 % (ref 0–2)
INR PPP: 1.05 (ref 0.84–1.19)
KETONES UR STRIP-MCNC: NEGATIVE MG/DL
LEUKOCYTE ESTERASE UR QL STRIP: ABNORMAL
LIPASE SERPL-CCNC: 65 U/L (ref 73–393)
LYMPHOCYTES # BLD AUTO: 0.94 THOUSANDS/ΜL (ref 0.6–4.47)
LYMPHOCYTES NFR BLD AUTO: 10 % (ref 14–44)
MAGNESIUM SERPL-MCNC: 2.1 MG/DL (ref 1.6–2.6)
MCH RBC QN AUTO: 29.9 PG (ref 26.8–34.3)
MCHC RBC AUTO-ENTMCNC: 31.9 G/DL (ref 31.4–37.4)
MCV RBC AUTO: 94 FL (ref 82–98)
MONOCYTES # BLD AUTO: 0.98 THOUSAND/ΜL (ref 0.17–1.22)
MONOCYTES NFR BLD AUTO: 10 % (ref 4–12)
NEUTROPHILS # BLD AUTO: 7.38 THOUSANDS/ΜL (ref 1.85–7.62)
NEUTS SEG NFR BLD AUTO: 79 % (ref 43–75)
NITRITE UR QL STRIP: NEGATIVE
NON-SQ EPI CELLS URNS QL MICRO: ABNORMAL /HPF
NRBC BLD AUTO-RTO: 0 /100 WBCS
OTHER STN SPEC: ABNORMAL
PH UR STRIP.AUTO: 6 [PH]
PHOSPHATE SERPL-MCNC: 3.8 MG/DL (ref 2.3–4.1)
PLATELET # BLD AUTO: 318 THOUSANDS/UL (ref 149–390)
PMV BLD AUTO: 10.2 FL (ref 8.9–12.7)
POTASSIUM SERPL-SCNC: 4 MMOL/L (ref 3.5–5.3)
PROT SERPL-MCNC: 8.2 G/DL (ref 6.4–8.2)
PROT UR STRIP-MCNC: ABNORMAL MG/DL
PROT UR-MCNC: 65 MG/DL
PROT/CREAT UR: 1.21 MG/G{CREAT} (ref 0–0.1)
PROTHROMBIN TIME: 13.7 SECONDS (ref 11.6–14.5)
RBC # BLD AUTO: 3.51 MILLION/UL (ref 3.81–5.12)
RBC #/AREA URNS AUTO: ABNORMAL /HPF
SODIUM SERPL-SCNC: 137 MMOL/L (ref 136–145)
SP GR UR STRIP.AUTO: 1.01 (ref 1–1.03)
T3 SERPL-MCNC: 1.3 NG/ML (ref 0.6–1.8)
T4 FREE SERPL-MCNC: 1.19 NG/DL (ref 0.76–1.46)
TSH SERPL DL<=0.05 MIU/L-ACNC: 1.11 UIU/ML (ref 0.36–3.74)
UROBILINOGEN UR QL STRIP.AUTO: 1 E.U./DL
WBC # BLD AUTO: 9.45 THOUSAND/UL (ref 4.31–10.16)
WBC #/AREA URNS AUTO: ABNORMAL /HPF

## 2021-01-29 PROCEDURE — 99214 OFFICE O/P EST MOD 30 MIN: CPT | Performed by: OBSTETRICS & GYNECOLOGY

## 2021-01-29 PROCEDURE — 81001 URINALYSIS AUTO W/SCOPE: CPT | Performed by: OBSTETRICS & GYNECOLOGY

## 2021-01-29 PROCEDURE — 84100 ASSAY OF PHOSPHORUS: CPT

## 2021-01-29 PROCEDURE — 82150 ASSAY OF AMYLASE: CPT

## 2021-01-29 PROCEDURE — 85730 THROMBOPLASTIN TIME PARTIAL: CPT

## 2021-01-29 PROCEDURE — 84156 ASSAY OF PROTEIN URINE: CPT | Performed by: OBSTETRICS & GYNECOLOGY

## 2021-01-29 PROCEDURE — 85025 COMPLETE CBC W/AUTO DIFF WBC: CPT

## 2021-01-29 PROCEDURE — 84439 ASSAY OF FREE THYROXINE: CPT

## 2021-01-29 PROCEDURE — 80053 COMPREHEN METABOLIC PANEL: CPT

## 2021-01-29 PROCEDURE — 83690 ASSAY OF LIPASE: CPT

## 2021-01-29 PROCEDURE — 84480 ASSAY TRIIODOTHYRONINE (T3): CPT

## 2021-01-29 PROCEDURE — 85610 PROTHROMBIN TIME: CPT

## 2021-01-29 PROCEDURE — 82465 ASSAY BLD/SERUM CHOLESTEROL: CPT

## 2021-01-29 PROCEDURE — 82570 ASSAY OF URINE CREATININE: CPT | Performed by: OBSTETRICS & GYNECOLOGY

## 2021-01-29 PROCEDURE — 82977 ASSAY OF GGT: CPT

## 2021-01-29 PROCEDURE — 83735 ASSAY OF MAGNESIUM: CPT

## 2021-01-29 PROCEDURE — 3008F BODY MASS INDEX DOCD: CPT | Performed by: NURSE PRACTITIONER

## 2021-01-29 PROCEDURE — 84443 ASSAY THYROID STIM HORMONE: CPT

## 2021-01-29 RX ORDER — SODIUM CHLORIDE 9 MG/ML
20 INJECTION, SOLUTION INTRAVENOUS CONTINUOUS
Status: DISCONTINUED | OUTPATIENT
Start: 2021-01-29 | End: 2021-02-03

## 2021-01-29 NOTE — ASSESSMENT & PLAN NOTE
PCNU in place  This is to remain on capped  Patient will have appointment with interventional radiology in March for  Exchange  She will follow-up with urology as well as scheduled

## 2021-01-29 NOTE — ASSESSMENT & PLAN NOTE
Patient reported need to have dental procedure either root canal or extraction done in the future  I made myself available to her dentist so we could help coordinate this procedure  If feasible, it would be ideal to delay until after CT scan so we with no wet long-term plan for current treatment is  Alternatively, if she needed treatment done sooner we could easily withhold Lovenox to allow for any dental procedure to be done safely  Patient will check in with her dental providers and communicate with me if/as needed

## 2021-01-29 NOTE — PLAN OF CARE
Problem: Potential for Falls  Goal: Patient will remain free of falls  Description: INTERVENTIONS:  - Assess patient frequently for physical needs  -  Identify cognitive and physical deficits and behaviors that affect risk of falls    -  [place] fall precautions as indicated by assessment   - Educate patient/family on patient safety including physical limitations  - Instruct patient to call for assistance with activity based on assessment  - Modify environment to reduce risk of injury  - Consider OT/PT consult to assist with strengthening/mobility  Outcome: Progressing

## 2021-01-29 NOTE — TELEPHONE ENCOUNTER
Due to the impending weather, this RD contacted pt today to discuss her RD follow up appointment on Monday morning that is supposed be during her infusion  Aladriana Flores stated that if we get snow she is planning to reschedule her infusion  We agreed to change her RD follow up appt to a phone visit on the same day and at the same time

## 2021-01-29 NOTE — PROGRESS NOTES
Patient was seen for Cycle 3 Day 1 pre treatment office visit  Protocol was followed as directed by study  Physical Exam and medical history were reviewed  Vital signs were collected  Patient AEs and Conmeds were reviewed  Patient reported congestion, constipation, nausea, and fatigue  Patient reported that she is taking Miralax once daily, and was recommended by Dr James Mccartney to increase this to twice daily  He also recommended patient to increase fluids  Dr James Mccartney recommended patient to take Rucaparib pill with food to aid in nausea  Patient inquired about the COVID-19 vaccine  She was recommended to schedule an appointment  Patient will have CT Scan and ECHO scheduled according to protocol  Patient was made aware of upcoming appointments, and was told to call with any questions or concerns

## 2021-01-29 NOTE — ASSESSMENT & PLAN NOTE
Patient is receiving palliative treatment on [facility] clinical trial   Overall, she is tolerating treatment well  Side effects include grade 2 constipation and grade 2 fatigue  Her GOG performance status is 1  Patient has been clinically cleared to receive cycle 3 of protocol treatment  I will continue to monitor laboratory parameters prior to infusion  She will have CT scan per protocol after this cycle  Will follow up on results  Of note, I have been checking weekly laboratory test results  Those are still within parameters acceptable for treatment  New labs ordered and will be drawn today  I will follow up on results accordingly

## 2021-01-29 NOTE — ASSESSMENT & PLAN NOTE
Likely associated with olaparib treatment  She also reports upset stomach associated with medicine  I recommended taking medicine with food  Drastically increasing hydration and using MiraLax daily 1-2 times per day

## 2021-01-29 NOTE — PROGRESS NOTES
Assessment/Plan:    Problem List Items Addressed This Visit        Digestive    Drug induced constipation       Likely associated with olaparib treatment  She also reports upset stomach associated with medicine  I recommended taking medicine with food  Drastically increasing hydration and using MiraLax daily 1-2 times per day  Dental disorder - Primary      Patient reported need to have dental procedure either root canal or extraction done in the future  I made myself available to her dentist so we could help coordinate this procedure  If feasible, it would be ideal to delay until after CT scan so we with no wet long-term plan for current treatment is  Alternatively, if she needed treatment done sooner we could easily withhold Lovenox to allow for any dental procedure to be done safely  Patient will check in with her dental providers and communicate with me if/as needed  Cardiovascular and Mediastinum    Acute deep vein thrombosis (DVT) of proximal vein of lower extremity (HCC)       On Lovenox  Will need anticoagulation for life  Genitourinary    Endometrial cancer ([facility] [address] ) (Chronic)       Patient is receiving palliative treatment on EndoBARR clinical trial   Overall, she is tolerating treatment well  Side effects include grade 2 constipation and grade 2 fatigue  Her GOG performance status is 1  Patient has been clinically cleared to receive cycle 3 of protocol treatment  I will continue to monitor laboratory parameters prior to infusion  She will have CT scan per protocol after this cycle  Will follow up on results  Of note, I have been checking weekly laboratory test results  Those are still within parameters acceptable for treatment  New labs ordered and will be drawn today  I will follow up on results accordingly  Other    Nephrostomy status ([facility] [address] )     PCNU in place  This is to remain on capped    Patient will have appointment with interventional radiology in March for  Exchange  She will follow-up with urology as well as scheduled  CHIEF COMPLAINT:     Follow-up for endometrial cancer on clinical trial, DVT, tooth pain, constipation, ureteral obstruction  Problem:  Cancer Staging  Endometrial cancer [facility])  Staging form: Corpus Uteri - Carcinoma, AJCC 7th Edition  - Clinical stage from 12/19/2017: FIGO Stage IB (T1b, N0, M0) - Signed by Araceli Simpson MD on 2/12/2018        Previous therapy:  Oncology History   Endometrial cancer ([facility] [address] )   11/17/2017 Initial Diagnosis    Endometrial cancer ([facility] [address] )     11/17/2017 Biopsy    ENDOMETRIAL BIOPSY: WELL DIFFERENTIATED ENDOMETRIAL ADENOCARCINOMA (FIGO I) WITH FOCALMUCINOUS FEATURES  Part B: ENDOCERVICAL POLYP:BENIGN ENDOCERVICAL      12/19/2017 Surgery    Robotic assisted total laparoscopic hysterectomy with bilateral salpingo-oophorectomy and sentinel bilateral pelvic lymph node dissection  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 (4 4 x 3 2 cm tumor, 9 4/15 4mm invasion, NO LVSI, washings revealed atypical cellular changes)     12/19/2017 Genetic Testing    Morrison testing negative     6/28/2019 Biopsy    A  Breast, Right, US BX Right Breast 1000 4 cmfn:  - Benign breast tissue with focal histiocytic aggregate  See comment   - Negative for atypia and in-situ or invasive carcinoma  7/8/2019 Recurrence    Presented with right lower extremity DVT and CT demonstrating right pelvic sidewall mass with venous, ureteral and nerve compression causing significant neuropathic pain  Biopsy:  Lymph Node, Right pelvic lymph node x3:  - High-grade adenocarcinoma  7/30/2019 - 1/6/2020 Chemotherapy    Taxol 175 mg/m2 and carboplatin AUC 6 every 21 days  Dose was reduced to taxol 135 mg/m2 and carboplatin AUC 5  Completed 6 cycles  Treatment protracted due to multiple hospital admissions       2/24/2020 - 4/13/2020 Radiation    adjuvant external beam radiation therapy to the whole pelvis to 4500 cGy followed by boost to gross disease of an additional 2200 cGy     11/23/2020 Progression    New necrotic adenopathy in the retroperitoneum on CT      12/21/2020 -  Chemotherapy    Began chemotherapy with rucaparib, atezolizumab, and bevacizumab as per [facility] clinical trial            Patient ID: Klaus Bermudez is a 63 y o  female  HPI    Patient well known to us with recurrent metastatic incurable stage I B endometrial cancer  She is currently being treated on palliative regimen via clinical trial EndoBARR consistent of atezolizumab, rucaparib and bevacizumab  Reports dental pain and undergoing consideration for dental extraction versus root canal   No fever or other signs of serous infection associated with this  Reports fatigue and ongoing constipation which improves partially using MiraLax once per day  Continues to use Lovenox daily for her known DVT  PCNU in place, uncapped  The following portions of the patient's history were reviewed and updated as appropriate: allergies, current medications, past family history, past medical history, past social history, past surgical history and problem list     Review of Systems    As per [facility] [place]  Twelve point review of systems otherwise unremarkable    Current Outpatient Medications   Medication Sig Dispense Refill    acetaminophen (TYLENOL) 325 mg tablet Take 2 tablets (650 mg total) by mouth every 6 (six) hours as needed for mild pain, headaches or fever 30 tablet 0    ARIPiprazole (ABILIFY) 10 mg tablet Take 10 mg by mouth daily      aspirin (ECOTRIN LOW STRENGTH) 81 mg EC tablet Take 81 mg by mouth daily      Calcium-Magnesium-Vitamin D (CALCIUM 500 PO) Take by mouth daily       cholecalciferol (VITAMIN D3) 1,000 units tablet Take 1,000 Units by mouth daily      Cyanocobalamin (VITAMIN B12 PO) Take by mouth daily       cyclobenzaprine (FLEXERIL) 10 mg tablet Take 1 tablet (10 mg total) by mouth daily at bedtime as needed for muscle spasms 30 tablet 1    enoxaparin (LOVENOX) 120 mg/0 8 mL INJECT 0 8 ML (120 MG TOTAL) UNDER THE SKIN DAILY 30 Syringe 3    folic acid (FOLVITE) 1 mg tablet Take 1 tablet (1 mg total) by mouth daily  0    gabapentin (NEURONTIN) 300 mg capsule Take 1 capsule (300 mg total) by mouth 2 (two) times a day 60 capsule 1    gemfibrozil (LOPID) 600 mg tablet TAKE 1 TABLET BY MOUTH EVERY DAY 90 tablet 1    Multiple Vitamin (MULTIVITAMIN) tablet Take 1 tablet by mouth daily      Myrbetriq 50 MG TB24 TAKE 1 TABLET BY MOUTH EVERY DAY 30 tablet 5    omeprazole (PriLOSEC) 20 mg delayed release capsule Take 20 mg by mouth daily      ondansetron (ZOFRAN) 4 mg tablet Take 1 tablet (4 mg total) by mouth every 8 (eight) hours as needed for nausea or vomiting 30 tablet 0    oxybutynin (DITROPAN XL) 15 MG 24 hr tablet Take 1 tablet (15 mg total) by mouth daily at bedtime 90 tablet 3    quinapril (ACCUPRIL) 5 mg tablet TAKE 1 TABLET BY MOUTH EVERYDAY AT BEDTIME 90 tablet 1    rucaparib (RUBRACA) 300 mg tablet Take 600 mg by mouth every 12 (twelve) hours Take with or without food  Do not repeat a vomited dose   saccharomyces boulardii (FLORASTOR) 250 mg capsule Take 1 capsule (250 mg total) by mouth 2 (two) times a day  0    Sodium Chloride Flush (Normal Saline Flush) 0 9 % SOLN       traMADol (ULTRAM) 50 mg tablet Take 1 tablet (50 mg total) by mouth every 6 (six) hours as needed for moderate pain 60 tablet 0    venlafaxine (EFFEXOR) 75 mg tablet Take 75 mg by mouth 3 (three) times a day        sodium chloride, PF, 0 9 % 10 mL by Intracatheter route daily for 30 doses 10cc syringes 300 mL 3     No current facility-administered medications for this visit  Objective:    Blood pressure 140/80, pulse 78, temperature 98 6 °F (37 °C), temperature source Temporal, resp  rate 18, height 4' 11" (1 499 m), weight 88 5 kg (195 lb), not currently breastfeeding  Body mass index is 39 39 kg/m²    Body surface area is 1 82 meters squared  Physical Exam  Constitutional:       General: She is not in acute distress  Appearance: Normal appearance  She is obese  She is not toxic-appearing  Eyes:      General:         Right eye: No discharge  Left eye: No discharge  Conjunctiva/sclera: Conjunctivae normal    Neck:      Musculoskeletal: No neck rigidity or muscular tenderness  Vascular: No carotid bruit  Cardiovascular:      Rate and Rhythm: Normal rate and regular rhythm  Heart sounds: Normal heart sounds  No murmur  Pulmonary:      Effort: Pulmonary effort is normal       Breath sounds: Normal breath sounds  No stridor  Abdominal:      General: There is no distension  Palpations: Abdomen is soft  Tenderness: There is no abdominal tenderness  There is no guarding  Lymphadenopathy:      Cervical: No cervical adenopathy  Neurological:      Mental Status: She is alert           No results found for:   Lab Results   Component Value Date     10/27/2017    K 4 2 01/25/2021     01/25/2021    CO2 23 01/25/2021    BUN 21 01/25/2021    CREATININE 1 05 01/25/2021    GLUCOSE 219 (H) 12/19/2017    GLUF 99 12/18/2020    CALCIUM 9 5 01/25/2021    CORRECTEDCA 10 2 (H) 12/09/2019    AST 17 01/25/2021    ALT 33 01/25/2021    ALKPHOS 161 (H) 01/25/2021    PROT 6 9 10/27/2017    BILITOT 0 3 10/27/2017    EGFR 57 01/25/2021     Lab Results   Component Value Date    WBC 10 03 01/25/2021    HGB 11 0 (L) 01/25/2021    HCT 34 9 01/25/2021    MCV 94 01/25/2021     01/25/2021     Lab Results   Component Value Date    NEUTROABS 7 82 (H) 01/25/2021     Marilin Hernandez MD, [address] [facility], [facility]  1/29/2021  8:57 AM

## 2021-02-01 ENCOUNTER — HOSPITAL ENCOUNTER (OUTPATIENT)
Dept: INFUSION CENTER | Facility: CLINIC | Age: 66
Discharge: HOME | End: 2021-02-01

## 2021-02-01 ENCOUNTER — TELEPHONE (OUTPATIENT)
Dept: NUTRITION | Facility: CLINIC | Age: 66
End: 2021-02-01

## 2021-02-01 DIAGNOSIS — C54.1 ENDOMETRIAL CANCER (HCC): Primary | ICD-10-CM

## 2021-02-01 NOTE — TELEPHONE ENCOUNTER
Maritza Courser was scheduled for an RD phone follow up today (2/1/2021) and was unable to attend her appointment (no answer)  A call was placed and a voice message was left stating the reason for my call today  Pt has RD contact info  Will attempt to reach pt at another time to reschedule her appt

## 2021-02-01 NOTE — PROGRESS NOTES
Outpatient Oncology Nutrition Consult  Type of Consult: Follow Up  Care Location: [facility]    Nutrition Assessment:  Oncology Diagnosis & Treatments:   Endometrial cancer  -S/p surgery 12/19/17    -Recurrence 7/8/19    -S/p chemotherapy (carboplatin/taxol from 7/30/19-1/6/20)  -S/p whole pelvis RT (2/4/20- 4/13/20)  -Disease progression    -ENDOBAR trial started 12/21/20  Oncology History   Endometrial cancer ([facility] [address] )   11/17/2017 Initial Diagnosis    Endometrial cancer ([facility] [address] )     11/17/2017 Biopsy    ENDOMETRIAL BIOPSY: WELL DIFFERENTIATED ENDOMETRIAL ADENOCARCINOMA (FIGO I) WITH FOCALMUCINOUS FEATURES  Part B: ENDOCERVICAL POLYP:BENIGN ENDOCERVICAL      12/19/2017 Surgery    Robotic assisted total laparoscopic hysterectomy with bilateral salpingo-oophorectomy and sentinel bilateral pelvic lymph node dissection  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 (4 4 x 3 2 cm tumor, 9 4/15 4mm invasion, NO LVSI, washings revealed atypical cellular changes)     12/19/2017 Genetic Testing    Morrison testing negative     6/28/2019 Biopsy    A  Breast, Right, US BX Right Breast 1000 4 cmfn:  - Benign breast tissue with focal histiocytic aggregate  See comment   - Negative for atypia and in-situ or invasive carcinoma  7/8/2019 Recurrence    Presented with right lower extremity DVT and CT demonstrating right pelvic sidewall mass with venous, ureteral and nerve compression causing significant neuropathic pain  Biopsy:  Lymph Node, Right pelvic lymph node x3:  - High-grade adenocarcinoma  7/30/2019 - 1/6/2020 Chemotherapy    Taxol 175 mg/m2 and carboplatin AUC 6 every 21 days  Dose was reduced to taxol 135 mg/m2 and carboplatin AUC 5  Completed 6 cycles  Treatment protracted due to multiple hospital admissions       2/24/2020 - 4/13/2020 Radiation    adjuvant external beam radiation therapy to the whole pelvis to 4500 cGy followed by boost to gross disease of an additional 2200 cGy 11/23/2020 Progression    New necrotic adenopathy in the retroperitoneum on CT      12/21/2020 -  Chemotherapy    Began chemotherapy with rucaparib, atezolizumab, and bevacizumab as per [facility] clinical trial        PMH:  RNYGB (2001 at [address])    Past Medical History:   Diagnosis Date    Anemia     Chemotherapy follow-up examination     Depression     Endometrial cancer ([address] ) 12/2017    Hyperglycemia     vx type 2 dm -- last assessed 4/1/14; resolved 11/7/17    Hyperlipidemia     Hypertension     Obesity     last assessed 10/14/17; resolved 11/7/17     Past Surgical History:   Procedure Laterality Date    ABDOMINAL SURGERY      GASTRIC BYPASS    CHOLECYSTECTOMY      at the time of gastric bypass    COLONOSCOPY      CT NEEDLE BIOPSY LYMPH NODE  7/8/2019    FL GUIDED CENTRAL VENOUS ACCESS DEVICE INSERTION  11/12/2019    GASTRIC BYPASS      HYSTERECTOMY Bilateral     total abdominal with salpingo-oophorectomy    IR NEPHROSTOMY TUBE PLACEMENT  7/26/2019    IR PICC LINE  9/27/2019    IR PORT PLACEMENT  7/26/2019    IR PORT REMOVAL  9/20/2019    OOPHORECTOMY Bilateral     WI INSJ TUNNELED CTR VAD W/SUBQ PORT AGE 5 YR/> Left 11/12/2019    Procedure: INSERTION VENOUS PORT ( PORT-A-CATH) IR;  Surgeon: Ibis Camp DO;  [facility];  Service: Interventional Radiology    WI LAP, RADICAL HYST W/ TUBE&OV, NODE BX N/A 12/19/2017    Procedure: HYSTERECTOMY LAPAROSCOPIC TOTAL ([address] [place] [address]) W/ ROBOTICS; BILATERAL SALPINGOOPHERECTOMY; LYMPH NODE DISSECTION; lysis of adhesions;  Surgeon: John De La Vega MD;  Location: [facility];  Service: Gynecology Oncology    WI LAP,DIAGNOSTIC ABDOMEN N/A 12/19/2017    Procedure: LAPAROSCOPY DIAGNOSTIC;  Surgeon: John De La Vega MD;  Location: [facility];  Service: Gynecology Oncology    TONSILLECTOMY      US GUIDED BREAST BIOPSY RIGHT COMPLETE Right 6/28/2019       Review of Medications:   Vitamins, Supplements and Herbals: yes: Hx RNYGB regimen: Vitamin D, Folic Acid, Align, calcium citrate TID, B12 1000 mcg QD, MVI BID (MVI does not have iron);  Iron 65 mg QD (2 hrs seperate from calcium)    Current Outpatient Medications:     acetaminophen (TYLENOL) 325 mg tablet, Take 2 tablets (650 mg total) by mouth every 6 (six) hours as needed for mild pain, headaches or fever, Disp: 30 tablet, Rfl: 0    ARIPiprazole (ABILIFY) 10 mg tablet, Take 10 mg by mouth daily, Disp: , Rfl:     aspirin (ECOTRIN LOW STRENGTH) 81 mg EC tablet, Take 81 mg by mouth daily, Disp: , Rfl:     Calcium-Magnesium-Vitamin D (CALCIUM 500 PO), Take by mouth daily , Disp: , Rfl:     cholecalciferol (VITAMIN D3) 1,000 units tablet, Take 1,000 Units by mouth daily, Disp: , Rfl:     Cyanocobalamin (VITAMIN B12 PO), Take by mouth daily , Disp: , Rfl:     cyclobenzaprine (FLEXERIL) 10 mg tablet, Take 1 tablet (10 mg total) by mouth daily at bedtime as needed for muscle spasms, Disp: 30 tablet, Rfl: 1    enoxaparin (LOVENOX) 120 mg/0 8 mL, INJECT 0 8 ML (120 MG TOTAL) UNDER THE SKIN DAILY, Disp: 30 Syringe, Rfl: 3    folic acid (FOLVITE) 1 mg tablet, Take 1 tablet (1 mg total) by mouth daily, Disp: , Rfl: 0    gabapentin (NEURONTIN) 300 mg capsule, Take 1 capsule (300 mg total) by mouth 2 (two) times a day, Disp: 60 capsule, Rfl: 1    gemfibrozil (LOPID) 600 mg tablet, TAKE 1 TABLET BY MOUTH EVERY DAY, Disp: 90 tablet, Rfl: 1    Multiple Vitamin (MULTIVITAMIN) tablet, Take 1 tablet by mouth daily, Disp: , Rfl:     Myrbetriq 50 MG TB24, TAKE 1 TABLET BY MOUTH EVERY DAY, Disp: 30 tablet, Rfl: 5    omeprazole (PriLOSEC) 20 mg delayed release capsule, Take 20 mg by mouth daily, Disp: , Rfl:     ondansetron (ZOFRAN) 4 mg tablet, Take 1 tablet (4 mg total) by mouth every 8 (eight) hours as needed for nausea or vomiting, Disp: 30 tablet, Rfl: 0    oxybutynin (DITROPAN XL) 15 MG 24 hr tablet, Take 1 tablet (15 mg total) by mouth daily at bedtime, Disp: 90 tablet, Rfl: 3    quinapril (ACCUPRIL) 5 mg tablet, TAKE 1 TABLET BY MOUTH EVERYDAY AT BEDTIME, Disp: 90 tablet, Rfl: 1    rucaparib (RUBRACA) 300 mg tablet, Take 600 mg by mouth every 12 (twelve) hours Take with or without food  Do not repeat a vomited dose , Disp: , Rfl:     saccharomyces boulardii (FLORASTOR) 250 mg capsule, Take 1 capsule (250 mg total) by mouth 2 (two) times a day, Disp: , Rfl: 0    Sodium Chloride Flush (Normal Saline Flush) 0 9 % SOLN, , Disp: , Rfl:     sodium chloride, PF, 0 9 %, 10 mL by Intracatheter route daily for 30 doses 10cc syringes, Disp: 300 mL, Rfl: 3    traMADol (ULTRAM) 50 mg tablet, Take 1 tablet (50 mg total) by mouth every 6 (six) hours as needed for moderate pain, Disp: 60 tablet, Rfl: 0    venlafaxine (EFFEXOR) 75 mg tablet, Take 75 mg by mouth 3 (three) times a day  , Disp: , Rfl:   No current facility-administered medications for this visit       Facility-Administered Medications Ordered in Other Visits:     INV atezolizumab (INVESTIGATIONAL) 1,200 mg in sodium chloride 0 9 % 250 mL infusion, 1,200 mg, Intravenous, Once, Jayme Holder MD    INV bevacizumab (INVESTIGATIONAL) 1,358 mg in sodium chloride 0 9 % 45 68 mL IVPB, 1,358 mg, Intravenous, Once, Jayme Holder MD    sodium chloride 0 9 % infusion, 20 mL/hr, Intravenous, Continuous, Jayme Holder MD    Most Recent Lab Results:  Lab Results   Component Value Date    WBC 9 59 02/03/2021    IRON 24 (L) 10/02/2019    TIBC 138 (L) 10/02/2019    FERRITIN 1,042 (H) 10/02/2019    CHOLESTEROL 184 02/03/2021    CHOL 183 10/27/2017    TRIG 88 11/01/2018    HDL 59 11/01/2018    LDLCALC 116 (H) 11/01/2018    ALT 31 02/03/2021    AST 17 02/03/2021    ALB 3 7 02/03/2021     10/27/2017     05/12/2017    SODIUM 134 (L) 02/03/2021    SODIUM 137 01/29/2021    K 4 5 02/03/2021    K 4 0 01/29/2021    CL 99 (L) 02/03/2021    BUN 23 02/03/2021    BUN 15 01/29/2021    CREATININE 1 01 02/03/2021    CREATININE 0 96 01/29/2021    EGFR 59 02/03/2021    PHOS 4 2 (H) 02/03/2021    PHOS 3 8 01/29/2021    GLUCOSE 219 (H) 12/19/2017    POCGLU 97 08/18/2020    GLUF 99 12/18/2020    GLUF 93 11/12/2020    GLUC 111 02/03/2021    HGBA1C 5 1 02/03/2020    HGBA1C 6 2 08/28/2019    HGBA1C 5 5 11/01/2018    CALCIUM 9 4 02/03/2021    FOLATE 5 4 10/06/2019    MG 1 9 02/03/2021     Anthropometric Measurements:   Height: 59"  Ht Readings from Last 1 Encounters:   02/03/21 4' 11" (1 499 m)     Wt Readings from Last 20 Encounters:   02/03/21 88 5 kg (195 lb)   01/29/21 88 5 kg (195 lb)   01/11/21 88 5 kg (195 lb)   01/08/21 89 4 kg (197 lb)   12/21/20 90 3 kg (199 lb)   12/18/20 90 5 kg (199 lb 8 oz)   12/04/20 88 9 kg (196 lb)   11/25/20 89 1 kg (196 lb 8 oz)   11/04/20 87 1 kg (192 lb)   10/14/20 86 4 kg (190 lb 6 4 oz)   08/13/20 85 7 kg (189 lb)   08/06/20 86 9 kg (191 lb 9 6 oz)   08/05/20 88 1 kg (194 lb 3 2 oz)   07/30/20 88 5 kg (195 lb)   06/30/20 88 8 kg (195 lb 12 8 oz)   06/08/20 88 5 kg (195 lb)   05/06/20 83 9 kg (185 lb)   02/04/20 85 5 kg (188 lb 6 4 oz)   02/03/20 85 7 kg (189 lb)   01/31/20 83 9 kg (185 lb)     Estimated body mass index is 39 39 kg/m² as calculated from the following:    Height as of an earlier encounter on 2/3/21: 4' 11" (1 499 m)  Weight as of an earlier encounter on 2/3/21: 88 5 kg (195 lb)  Weight Hx:  · Varian: (2/25/20) 185 6#, (3/3/20) 188 6#, (3/10/20) 187 2#, (3/19/20) 186 2#, (3/24/20) 187#, (3/31/20) 185 4#, (4/10/20) 184  4#   · Home Scale Wts: (2/19/20) 185#, (8/3/20) 194# - no recent home wts  · Usual Weight: 270# before RNYGB in 2001; lowest post-op wt: 200#; wt typically fluctuates between 215-230#  · Comments: loss of 2# (1%) in 1 month (not significant)     Oncology Nutrition-Anthropometrics      Most Recent Value   Patient age (years):  63 years   Patient (female) height (in):  61 in   Current Weight to be used for anthropometric calculations (lbs)  195 lbs   Current Weight to be used for anthropometric calculations (kg)  89 kg IBW female:  95 lbs   IBW (kg) female:  43 2 kg   IBW % (female)  205 3 %   Adjusted BW (female):  120 lbs   Weight change after 1 month (lbs)  -2 lbs   Weight change after 3 months (lbs)  3 lbs   Weight change after 6 months (lbs)  1 lbs         Nutrition-Focused Physical Findings: none observed     Food/Nutrition-Related History & Client/Social History:    Current Nutrition Impact Symptoms:  [x] Nausea -  Mild after infusions, pt reports this is mainly r/t post nasal drip [x] Reduced Appetite  [] Acid Reflux   [] Vomiting  [] Unintended Wt Loss  [] Malabsorption    [] Diarrhea  [] Unintended Wt Gain  [] Dumping Syndrome   [x] Constipation - improved, +BM's 1-2x/day, using Miralax [] Thick Mucous/Secretions  [] Abdominal Pain    [] Dysgeusia (Altered Taste) [] Xerostomia (Dry Mouth)  [] Gas    [] Dysosmia (Altered Smell)  [] Thrush  [] Difficulty Chewing    [] Oral Mucositis (Sore Mouth)  [x] Fatigue  [] Other:    [] Odynophagia   [] Esophagitis  [] Other:    [] Dysphagia [] Early Satiety  [] No Problems Eating      Food Allergies: yes: had skin patch testing which showed allergy to strawberries, but says she is able to eat strawberries without any side effects  Food Intolerances: no    Current Diet: Regular Diet, No Restrictions and Small Frequent Meals (small meals are easier)  Current Nutrition Intake: Unchanged from last visit  Appetite: Good, Fair  - has to force self to eat sometimes  Nutrition Route: PO  Meal planning/preparation mainly done by: mother does cooking; sister does shopping  Activity level: Mainly sedentary  24 Hr Diet Recall: She is trying to make sure to eat at scheduled times and when she is hungry  Says she has increased her fiber and protein intakes but not veggie intake  Snack: 1/2 donut on way to bloodwork  Breakfast: 1 egg with wheat toast  Lunch: 1 cup rice pilaf  Snack: [facility]:  [facility] with rice pilaf  Snack: pears with whipped cream    Beverages: water (16 9 oz x2-3), regular unsweetened tea (1 large and 1 medium from [employer]- when tea is gone adds water)   -Does not drink coffee or alcohol      -Does not separate fluids from solids, does not have discomfort while eating and drinking at the same time  Supplements:    None       Oncology Nutrition-Estimated Needs      Nutrition from 2020 in [facility]   Weight type used  Adjusted weight   Weight in pounds (lbs) used for estimated needs  119 lbs   Weight in kilograms (kg) used for estimated needs  54 1 kg   Energy needs based on 25 kcal/k kcal   Energy needs based on 30 kcal/k kcal   Protein needs based on 1 g/kg  54 g   Protein needs based on 1 2 g/k g   Fluid needs based on 25 mL/kg  1350 mL   Fluid needs in ounces  46 oz   Fluid needs based on 30 mL/kg  1620 mL   Fluid needs in ounces  55 oz        Discussion & Intervention:   Dequan Garza was evaluated today for an RD follow up regarding pt request for dietitian involvement throughout tx  Dequan Garza is currently undergoing tx for endometrial cancer  She is doing well today  She has lost 2# over the past month but reports stability recently  Her appetite remains fair-good and she says she has to force herself to eat sometimes  She says she does best with small, frequent meals and snacks  Her constipation has resolved  She continues to have mild nausea which she mainly attributes to post nasal drip  She has been working on increasing her protein and fiber intake but admits she has not incorporated many vegetables into her eating pattern  Reviewed the importance of wt management throughout the tx process and the role of a high kcal/ high protein diet and a cancer preventative diet in managing wt and overall health    Discussed ways to optimize nutrient intake, suggestions include: eating smaller/more frequent meals, including high protein foods in diet (eggs, chicken, fish, beans/legumes, nuts/nut butters, etc ), including high fiber foods in diet (fruits, vegetables, whole grains, beans, etc ), eating when feeling most hungry and choosing a variety of colorful, plant-based foods  Reviewed 24 hour recall, which revealed an adequate po intake, and discussed ways to increase kcal, protein, and fluid intakes and transition to a cancer preventative diet, suggestions include: adding cheese to foods such as eggs, mashed potatoes, casseroles, etc , making oatmeal with whole milk rather than water, adding nut butter to foods, having a protein source at all meals and snacks, having a fiber source at all meals and snacks, trying new recipes and cooking at home more often  Also discussed: weight management, high protein foods to include at all meals & snacks, high fiber foods to include at all meals & snacks, fiber content of foods, ways to increase overall calorie intake, encouraged eating every 2-3 hours (5-6 small meals/day), recipe suggestions/resources and a cancer preventative eating pattern as a long-term nutrition goal   We discussed tips for meal planning and ways to incorporate healthy foods of preference into her eating pattern  Moving forward, Eren Rodriguez plans to and was encouraged to increase her vegetable intake and keep working to increase her fiber and lean protein intakes  She was encouraged to meal plan ahead of time and try some of the healthy recipe resources provided  We will follow up in ~3 weeks  Materials Provided: not applicable  All questions and concerns addressed during todays visit  Eren Rodriguez has RD contact information  Nutrition Diagnosis:  Increased Nutrient Needs (kcal & pro) related to increased demand for nutrients and disease state as evidenced by cancer dx and pt undergoing tx for cancer    Behavorial/Environmental- Knowledge & Beliefs: Limited adherence to nutrition-related recommendations related to behavioral causes as evidenced by Per pt interview, has not acheived nutrition goals set over multiple sessions  Monitoring & Evaluation:   Goals:  · weight maintenance/stabilization  · adequate nutrition impact symptom management  · pt to meet >/=75% estimated nutrition needs daily   · Continue lifelong vitamin/mineral supplementation for RNY Gastric Bypass  · Follow a cancer-preventative diet   · Increase vegetable intake  · Increase fiber intake  · Increase protein intake    · Progress Towards Goals: Progressing    Barriers: None  Readiness to change: action  Comprehension: needs reinforcement and verbalizes understanding  Expected Compliance: fair      Nutrition Rx & Recommendations:  · Small, frequent meals/snacks may be easier to tolerate than 3 large daily meals  Aim for 5-6 small meals per day (every 2-3 hours)  · Include protein at all meals/snacks  · Follow a Cancer Preventative Nutrition Pattern: colorful, plant-based, low-fat, avoid added sugars, limit alcohol, include high fiber foods, limit processed meats, limit red meat, choose lean protein sources, use low-fat cooking methods, balance calories with physical activity, avoid excessive weight gain throughout life  · Incorporate physical activity as able/allowed  · Fluid Goal: 46-55 oz per day  · Work on meal planning ahead of time  · Lifetime vitamin/mineral supplementation recommended for Gastric Bypass Surgery:  · Multivitamin BID  · Iron 45 mg daily  · Vitamin D daily  · Calcium Citrate 500 mg TID (do not take with iron, needs to be 2 hrs apart from iron)  · B12 1000 mcg daily    · Resources for recipes: [employer], cancerdietitian  com, oncPositiveIDk org/support/nutrition-and-cancer, Skinnytaste  com, www oncologynutrition  org/erfc/recipes, www cookforyourlife  org, The Essential Cancer Treatment Nutrition Guide and Cookbook: Includes 831 Healthy and Delicious Recipes  Personalized Goals:  1   Increase vegetable intake: peppers, lettuce, carrots, tomatoes, sweet potatoes, butternut squash, salads  2  Continue to work on increasing your fiber intake: vegetables, nuts, oatmeal made with milk, fresh fruit, whole grains  3  Make sure you have a protein source at all eating occasions        Follow Up Plan: 2/22/21 during infusion   Recommend Referral to Other Providers: none at this time

## 2021-02-01 NOTE — TELEPHONE ENCOUNTER
Pt called back re: missed appt  Discussed follow up plan  Will touch base with pt at her infusion appt on Wednesday, 2/3

## 2021-02-02 ENCOUNTER — HOSPITAL ENCOUNTER (OUTPATIENT)
Dept: INFUSION CENTER | Facility: CLINIC | Age: 66
End: 2021-02-02

## 2021-02-03 ENCOUNTER — HOSPITAL ENCOUNTER (OUTPATIENT)
Dept: INFUSION CENTER | Facility: CLINIC | Age: 66
Discharge: HOME | End: 2021-02-03
Payer: COMMERCIAL

## 2021-02-03 ENCOUNTER — NUTRITION (OUTPATIENT)
Dept: NUTRITION | Facility: CLINIC | Age: 66
End: 2021-02-03

## 2021-02-03 VITALS
TEMPERATURE: 97 F | SYSTOLIC BLOOD PRESSURE: 138 MMHG | WEIGHT: 195 LBS | DIASTOLIC BLOOD PRESSURE: 86 MMHG | RESPIRATION RATE: 20 BRPM | OXYGEN SATURATION: 98 % | HEART RATE: 73 BPM | HEIGHT: 59 IN | BODY MASS INDEX: 39.31 KG/M2

## 2021-02-03 VITALS — TEMPERATURE: 96.9 F

## 2021-02-03 DIAGNOSIS — C54.1 ENDOMETRIAL CANCER (HCC): Primary | ICD-10-CM

## 2021-02-03 DIAGNOSIS — Z71.3 NUTRITIONAL COUNSELING: Primary | ICD-10-CM

## 2021-02-03 DIAGNOSIS — Z45.2 ENCOUNTER FOR CENTRAL LINE CARE: ICD-10-CM

## 2021-02-03 LAB
ALBUMIN SERPL BCP-MCNC: 3.7 G/DL (ref 3.5–5)
ALP SERPL-CCNC: 163 U/L (ref 46–116)
ALT SERPL W P-5'-P-CCNC: 31 U/L (ref 12–78)
AMYLASE SERPL-CCNC: 30 IU/L (ref 25–115)
ANION GAP SERPL CALCULATED.3IONS-SCNC: 13 MMOL/L (ref 4–13)
APTT PPP: 48 SECONDS (ref 23–37)
AST SERPL W P-5'-P-CCNC: 17 U/L (ref 5–45)
BACTERIA UR QL AUTO: ABNORMAL /HPF
BASOPHILS # BLD AUTO: 0.07 THOUSANDS/ΜL (ref 0–0.1)
BASOPHILS NFR BLD AUTO: 1 % (ref 0–1)
BILIRUB SERPL-MCNC: 0.21 MG/DL (ref 0.2–1)
BILIRUB UR QL STRIP: NEGATIVE
BUN SERPL-MCNC: 23 MG/DL (ref 5–25)
CALCIUM SERPL-MCNC: 9.4 MG/DL (ref 8.3–10.1)
CHLORIDE SERPL-SCNC: 99 MMOL/L (ref 100–108)
CHOLEST SERPL-MCNC: 184 MG/DL (ref 50–200)
CLARITY UR: ABNORMAL
CO2 SERPL-SCNC: 22 MMOL/L (ref 21–32)
COLOR UR: YELLOW
CREAT SERPL-MCNC: 1.01 MG/DL (ref 0.6–1.3)
CREAT UR-MCNC: 29 MG/DL
EOSINOPHIL # BLD AUTO: 0.02 THOUSAND/ΜL (ref 0–0.61)
EOSINOPHIL NFR BLD AUTO: 0 % (ref 0–6)
ERYTHROCYTE [DISTWIDTH] IN BLOOD BY AUTOMATED COUNT: 13.9 % (ref 11.6–15.1)
GFR SERPL CREATININE-BSD FRML MDRD: 59 ML/MIN/1.73SQ M
GGT SERPL-CCNC: 13 U/L (ref 5–85)
GLUCOSE SERPL-MCNC: 111 MG/DL (ref 65–140)
GLUCOSE UR STRIP-MCNC: NEGATIVE MG/DL
HCT VFR BLD AUTO: 34.7 % (ref 34.8–46.1)
HGB BLD-MCNC: 10.9 G/DL (ref 11.5–15.4)
HGB UR QL STRIP.AUTO: ABNORMAL
IMM GRANULOCYTES # BLD AUTO: 0.13 THOUSAND/UL (ref 0–0.2)
IMM GRANULOCYTES NFR BLD AUTO: 1 % (ref 0–2)
INR PPP: 1 (ref 0.84–1.19)
KETONES UR STRIP-MCNC: NEGATIVE MG/DL
LEUKOCYTE ESTERASE UR QL STRIP: ABNORMAL
LIPASE SERPL-CCNC: 73 U/L (ref 73–393)
LYMPHOCYTES # BLD AUTO: 1.06 THOUSANDS/ΜL (ref 0.6–4.47)
LYMPHOCYTES NFR BLD AUTO: 11 % (ref 14–44)
MAGNESIUM SERPL-MCNC: 1.9 MG/DL (ref 1.6–2.6)
MCH RBC QN AUTO: 29.5 PG (ref 26.8–34.3)
MCHC RBC AUTO-ENTMCNC: 31.4 G/DL (ref 31.4–37.4)
MCV RBC AUTO: 94 FL (ref 82–98)
MONOCYTES # BLD AUTO: 0.87 THOUSAND/ΜL (ref 0.17–1.22)
MONOCYTES NFR BLD AUTO: 9 % (ref 4–12)
NEUTROPHILS # BLD AUTO: 7.44 THOUSANDS/ΜL (ref 1.85–7.62)
NEUTS SEG NFR BLD AUTO: 78 % (ref 43–75)
NITRITE UR QL STRIP: NEGATIVE
NON-SQ EPI CELLS URNS QL MICRO: ABNORMAL /HPF
NRBC BLD AUTO-RTO: 0 /100 WBCS
PH UR STRIP.AUTO: 6 [PH]
PHOSPHATE SERPL-MCNC: 4.2 MG/DL (ref 2.3–4.1)
PLATELET # BLD AUTO: 367 THOUSANDS/UL (ref 149–390)
PMV BLD AUTO: 9.5 FL (ref 8.9–12.7)
POTASSIUM SERPL-SCNC: 4.5 MMOL/L (ref 3.5–5.3)
PROT SERPL-MCNC: 8.4 G/DL (ref 6.4–8.2)
PROT UR STRIP-MCNC: ABNORMAL MG/DL
PROT UR-MCNC: 50 MG/DL
PROT/CREAT UR: 1.72 MG/G{CREAT} (ref 0–0.1)
PROTHROMBIN TIME: 13.3 SECONDS (ref 11.6–14.5)
RBC # BLD AUTO: 3.69 MILLION/UL (ref 3.81–5.12)
RBC #/AREA URNS AUTO: ABNORMAL /HPF
SODIUM SERPL-SCNC: 134 MMOL/L (ref 136–145)
SP GR UR STRIP.AUTO: 1.01 (ref 1–1.03)
T3 SERPL-MCNC: 1.5 NG/ML (ref 0.6–1.8)
T4 FREE SERPL-MCNC: 1.2 NG/DL (ref 0.76–1.46)
TSH SERPL DL<=0.05 MIU/L-ACNC: 1.54 UIU/ML (ref 0.36–3.74)
UROBILINOGEN UR QL STRIP.AUTO: 0.2 E.U./DL
WBC # BLD AUTO: 9.59 THOUSAND/UL (ref 4.31–10.16)
WBC #/AREA URNS AUTO: ABNORMAL /HPF

## 2021-02-03 PROCEDURE — 96417 CHEMO IV INFUS EACH ADDL SEQ: CPT

## 2021-02-03 PROCEDURE — 84480 ASSAY TRIIODOTHYRONINE (T3): CPT

## 2021-02-03 PROCEDURE — 96413 CHEMO IV INFUSION 1 HR: CPT

## 2021-02-03 PROCEDURE — 82465 ASSAY BLD/SERUM CHOLESTEROL: CPT

## 2021-02-03 PROCEDURE — 83735 ASSAY OF MAGNESIUM: CPT

## 2021-02-03 PROCEDURE — J9999Q0 INV ATEZOLIZUMAB 1200MG/20ML 20 ML: Performed by: OBSTETRICS & GYNECOLOGY

## 2021-02-03 PROCEDURE — 85610 PROTHROMBIN TIME: CPT

## 2021-02-03 PROCEDURE — 84439 ASSAY OF FREE THYROXINE: CPT

## 2021-02-03 PROCEDURE — 80053 COMPREHEN METABOLIC PANEL: CPT

## 2021-02-03 PROCEDURE — 85730 THROMBOPLASTIN TIME PARTIAL: CPT

## 2021-02-03 PROCEDURE — 84100 ASSAY OF PHOSPHORUS: CPT

## 2021-02-03 PROCEDURE — J9035Q0 INV BEVACIZUMAB 400MG/16ML 16 ML: Performed by: OBSTETRICS & GYNECOLOGY

## 2021-02-03 PROCEDURE — 84156 ASSAY OF PROTEIN URINE: CPT

## 2021-02-03 PROCEDURE — 82977 ASSAY OF GGT: CPT

## 2021-02-03 PROCEDURE — 84443 ASSAY THYROID STIM HORMONE: CPT

## 2021-02-03 PROCEDURE — 83690 ASSAY OF LIPASE: CPT

## 2021-02-03 PROCEDURE — 85025 COMPLETE CBC W/AUTO DIFF WBC: CPT

## 2021-02-03 PROCEDURE — 82150 ASSAY OF AMYLASE: CPT

## 2021-02-03 PROCEDURE — 81001 URINALYSIS AUTO W/SCOPE: CPT

## 2021-02-03 PROCEDURE — 82570 ASSAY OF URINE CREATININE: CPT

## 2021-02-03 RX ORDER — SODIUM CHLORIDE 9 MG/ML
20 INJECTION, SOLUTION INTRAVENOUS CONTINUOUS
Status: DISCONTINUED | OUTPATIENT
Start: 2021-02-03 | End: 2021-02-06 | Stop reason: HOSPADM

## 2021-02-03 RX ADMIN — Medication 1358 MG: at 15:16

## 2021-02-03 RX ADMIN — SODIUM CHLORIDE 20 ML/HR: 0.9 INJECTION, SOLUTION INTRAVENOUS at 14:25

## 2021-02-03 RX ADMIN — Medication 1200 MG: at 14:32

## 2021-02-03 NOTE — PLAN OF CARE
Problem: Potential for Falls  Goal: Patient will remain free of falls  Description: INTERVENTIONS:  - Assess patient frequently for physical needs  -  Identify cognitive and physical deficits and behaviors that affect risk of falls  -  Capac fall precautions as indicated by assessment   - Educate patient/family on patient safety including physical limitations  - Instruct patient to call for assistance with activity based on assessment  - Modify environment to reduce risk of injury  - Consider OT/PT consult to assist with strengthening/mobility  Outcome: Progressing     Problem: SAFETY ADULT  Goal: Patient will remain free of falls  Description: INTERVENTIONS:  - Assess patient frequently for physical needs  -  Identify cognitive and physical deficits and behaviors that affect risk of falls  -  Capac fall precautions as indicated by assessment   - Educate patient/family on patient safety including physical limitations  - Instruct patient to call for assistance with activity based on assessment  - Modify environment to reduce risk of injury  - Consider OT/PT consult to assist with strengthening/mobility  Outcome: Progressing     Problem: Knowledge Deficit  Goal: Patient/family/caregiver demonstrates understanding of disease process, treatment plan, medications, and discharge instructions  Description: Complete learning assessment and assess knowledge base    Interventions:  - Provide teaching at level of understanding  - Provide teaching via preferred learning methods  Outcome: Progressing

## 2021-02-03 NOTE — PROGRESS NOTES
Pt presents for lab work  Port flushed with blood return noted  Blood work drawn and sent to lab  Saline locked  Pt remains accessed for appt later today  Pt declined AVS  Aware of future appts

## 2021-02-03 NOTE — PATIENT INSTRUCTIONS
Nutrition Rx & Recommendations:  · Small, frequent meals/snacks may be easier to tolerate than 3 large daily meals  Aim for 5-6 small meals per day (every 2-3 hours)  · Include protein at all meals/snacks  · Follow a Cancer Preventative Nutrition Pattern: colorful, plant-based, low-fat, avoid added sugars, limit alcohol, include high fiber foods, limit processed meats, limit red meat, choose lean protein sources, use low-fat cooking methods, balance calories with physical activity, avoid excessive weight gain throughout life  · Incorporate physical activity as able/allowed  · Fluid Goal: 46-55 oz per day  · Work on meal planning ahead of time  · Lifetime vitamin/mineral supplementation recommended for Gastric Bypass Surgery:  · Multivitamin BID  · Iron 45 mg daily  · Vitamin D daily  · Calcium Citrate 500 mg TID (do not take with iron, needs to be 2 hrs apart from iron)  · B12 1000 mcg daily    · Resources for recipes: [employer], cancerdietitian  com, oncLively/support/nutrition-and-cancer, Skinnytaste  com, www oncologynutrition  org/erfc/recipes, www cookforyourlife  org, The Essential Cancer Treatment Nutrition Guide and Cookbook: Includes 042 Healthy and Delicious Recipes  Personalized Goals:  1  Increase vegetable intake: peppers, lettuce, carrots, tomatoes, sweet potatoes, butternut squash, salads  2  Continue to work on increasing your fiber intake: vegetables, nuts, oatmeal made with milk, fresh fruit, whole grains  3  Make sure you have a protein source at all eating occasions        Follow Up Plan: 2/22/21 during infusion   Recommend Referral to Other Providers: none at this time

## 2021-02-04 ENCOUNTER — TRANSCRIBE ORDERS (OUTPATIENT)
Dept: RADIOLOGY | Facility: HOSPITAL | Age: 66
End: 2021-02-04

## 2021-02-04 ENCOUNTER — HOSPITAL ENCOUNTER (OUTPATIENT)
Dept: RADIOLOGY | Facility: HOSPITAL | Age: 66
Discharge: HOME | End: 2021-02-04
Attending: OBSTETRICS & GYNECOLOGY
Payer: COMMERCIAL

## 2021-02-04 ENCOUNTER — DOCUMENTATION (OUTPATIENT)
Dept: OTHER | Facility: HOSPITAL | Age: 66
End: 2021-02-04

## 2021-02-04 DIAGNOSIS — C54.1 ENDOMETRIAL CANCER (HCC): ICD-10-CM

## 2021-02-04 PROCEDURE — G1004 CDSM NDSC: HCPCS

## 2021-02-04 PROCEDURE — 71250 CT THORAX DX C-: CPT

## 2021-02-04 PROCEDURE — 74176 CT ABD & PELVIS W/O CONTRAST: CPT

## 2021-02-04 NOTE — PROGRESS NOTES
Patient was seen in [address] [place] [address] on 2/3/21 for her Cycle 3 Day 1 treatment visit  Labs were done prior to visit on 2/3/21 and reviwed by Dr Vin Mehta, patient was deemed ok for treatment  Protocol was followed as outlined  Conmeds and AEs were reviewed with the patient  She reported that she is still taking Miralax once a day to help with constipation  Patient reported that she is intermittantly experiencing nausea, but believes this could be due to post nasal drip that she is experiencing as well  Patient has not vomited  Patient reported that she still has shortness of breath upon exertion  Patient tolerated treatment without any issues  She was made aware of future appointments, and told to call with any questions or concerns

## 2021-02-05 DIAGNOSIS — I10 BENIGN ESSENTIAL HYPERTENSION: ICD-10-CM

## 2021-02-06 RX ORDER — QUINAPRIL 5 1/1
TABLET ORAL
Qty: 90 TABLET | Refills: 1 | Status: SHIPPED | OUTPATIENT
Start: 2021-02-06 | End: 2021-08-09

## 2021-02-08 ENCOUNTER — VBI (OUTPATIENT)
Dept: ADMINISTRATIVE | Facility: OTHER | Age: 66
End: 2021-02-08

## 2021-02-12 NOTE — PROGRESS NOTES
Outpatient Oncology Nutrition Consult  Type of Consult: Follow Up  Care Location: [facility]    Nutrition Assessment:  Oncology Diagnosis & Treatments:   Endometrial cancer  -S/p surgery 12/19/17    -Recurrence 7/8/19    -S/p chemotherapy (carboplatin/taxol from 7/30/19-1/6/20)  -S/p whole pelvis RT (2/4/20- 4/13/20)  -Disease progression    -ENDOBAR trial started 12/21/20  Oncology History   Endometrial cancer ([facility] [address] )   11/17/2017 Initial Diagnosis    Endometrial cancer ([facility] [address] )     11/17/2017 Biopsy    ENDOMETRIAL BIOPSY: WELL DIFFERENTIATED ENDOMETRIAL ADENOCARCINOMA (FIGO I) WITH FOCALMUCINOUS FEATURES  Part B: ENDOCERVICAL POLYP:BENIGN ENDOCERVICAL      12/19/2017 Surgery    Robotic assisted total laparoscopic hysterectomy with bilateral salpingo-oophorectomy and sentinel bilateral pelvic lymph node dissection  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 (4 4 x 3 2 cm tumor, 9 4/15 4mm invasion, NO LVSI, washings revealed atypical cellular changes)     12/19/2017 Genetic Testing    Morrison testing negative     6/28/2019 Biopsy    A  Breast, Right, US BX Right Breast 1000 4 cmfn:  - Benign breast tissue with focal histiocytic aggregate  See comment   - Negative for atypia and in-situ or invasive carcinoma  7/8/2019 Recurrence    Presented with right lower extremity DVT and CT demonstrating right pelvic sidewall mass with venous, ureteral and nerve compression causing significant neuropathic pain  Biopsy:  Lymph Node, Right pelvic lymph node x3:  - High-grade adenocarcinoma  7/30/2019 - 1/6/2020 Chemotherapy    Taxol 175 mg/m2 and carboplatin AUC 6 every 21 days  Dose was reduced to taxol 135 mg/m2 and carboplatin AUC 5  Completed 6 cycles  Treatment protracted due to multiple hospital admissions       2/24/2020 - 4/13/2020 Radiation    adjuvant external beam radiation therapy to the whole pelvis to 4500 cGy followed by boost to gross disease of an additional 2200 cGy 11/23/2020 Progression    New necrotic adenopathy in the retroperitoneum on CT      12/21/2020 -  Chemotherapy    Began chemotherapy with rucaparib, atezolizumab, and bevacizumab as per [facility] clinical trial        PMH:  RNYGB (2001 at [address])    Past Medical History:   Diagnosis Date    Anemia     Chemotherapy follow-up examination     Depression     Endometrial cancer ([address] ) 12/2017    Hyperglycemia     vx type 2 dm -- last assessed 4/1/14; resolved 11/7/17    Hyperlipidemia     Hypertension     Obesity     last assessed 10/14/17; resolved 11/7/17     Past Surgical History:   Procedure Laterality Date    ABDOMINAL SURGERY      GASTRIC BYPASS    CHOLECYSTECTOMY      at the time of gastric bypass    COLONOSCOPY      CT NEEDLE BIOPSY LYMPH NODE  7/8/2019    FL GUIDED CENTRAL VENOUS ACCESS DEVICE INSERTION  11/12/2019    GASTRIC BYPASS      HYSTERECTOMY Bilateral     total abdominal with salpingo-oophorectomy    IR NEPHROSTOMY TUBE PLACEMENT  7/26/2019    IR PICC LINE  9/27/2019    IR PORT PLACEMENT  7/26/2019    IR PORT REMOVAL  9/20/2019    OOPHORECTOMY Bilateral     MS INSJ TUNNELED CTR VAD W/SUBQ PORT AGE 5 YR/> Left 11/12/2019    Procedure: INSERTION VENOUS PORT ( PORT-A-CATH) IR;  Surgeon: Lindy Harvey DO;  [facility]: [facility];  Service: Interventional Radiology    MS LAP, RADICAL HYST W/ TUBE&OV, NODE BX N/A 12/19/2017    Procedure: HYSTERECTOMY LAPAROSCOPIC TOTAL ([address] [place] [address]) W/ ROBOTICS; BILATERAL SALPINGOOPHERECTOMY; LYMPH NODE DISSECTION; lysis of adhesions;  Surgeon: Laith German MD;  Location: [facility];  Service: Gynecology Oncology    MS LAP,DIAGNOSTIC ABDOMEN N/A 12/19/2017    Procedure: LAPAROSCOPY DIAGNOSTIC;  Surgeon: Laith German MD;  Location[facility];  Service: Gynecology Oncology    TONSILLECTOMY      US GUIDED BREAST BIOPSY RIGHT COMPLETE Right 6/28/2019       Review of Medications:   Vitamins, Supplements and Herbals: yes: Hx RNYGB regimen: Vitamin D, Folic Acid, Align, calcium citrate TID, B12 1000 mcg QD, MVI BID (MVI does not have iron);  Iron 65 mg QD (2 hrs seperate from calcium)    Current Outpatient Medications:     acetaminophen (TYLENOL) 325 mg tablet, Take 2 tablets (650 mg total) by mouth every 6 (six) hours as needed for mild pain, headaches or fever, Disp: 30 tablet, Rfl: 0    ARIPiprazole (ABILIFY) 10 mg tablet, Take 10 mg by mouth daily, Disp: , Rfl:     aspirin (ECOTRIN LOW STRENGTH) 81 mg EC tablet, Take 81 mg by mouth daily, Disp: , Rfl:     Calcium-Magnesium-Vitamin D (CALCIUM 500 PO), Take by mouth daily , Disp: , Rfl:     cholecalciferol (VITAMIN D3) 1,000 units tablet, Take 1,000 Units by mouth daily, Disp: , Rfl:     Cyanocobalamin (VITAMIN B12 PO), Take by mouth daily , Disp: , Rfl:     enoxaparin (LOVENOX) 120 mg/0 8 mL, INJECT 0 8 ML (120 MG TOTAL) UNDER THE SKIN DAILY, Disp: 30 Syringe, Rfl: 3    folic acid (FOLVITE) 1 mg tablet, Take 1 tablet (1 mg total) by mouth daily, Disp: , Rfl: 0    gemfibrozil (LOPID) 600 mg tablet, TAKE 1 TABLET BY MOUTH EVERY DAY, Disp: 90 tablet, Rfl: 1    LORazepam (ATIVAN) 0 5 mg tablet, Take 1 tablet (0 5 mg total) by mouth every 6 (six) hours as needed for anxiety (or nausea), Disp: 36 tablet, Rfl: 1    Multiple Vitamin (MULTIVITAMIN) tablet, Take 1 tablet by mouth daily, Disp: , Rfl:     Myrbetriq 50 MG TB24, TAKE 1 TABLET BY MOUTH EVERY DAY, Disp: 30 tablet, Rfl: 5    omeprazole (PriLOSEC) 20 mg delayed release capsule, Take 20 mg by mouth daily, Disp: , Rfl:     ondansetron (ZOFRAN) 4 mg tablet, Take 1 tablet (4 mg total) by mouth every 8 (eight) hours as needed for nausea or vomiting, Disp: 30 tablet, Rfl: 0    oxybutynin (DITROPAN XL) 15 MG 24 hr tablet, Take 1 tablet (15 mg total) by mouth daily at bedtime, Disp: 90 tablet, Rfl: 3    quinapril (ACCUPRIL) 5 mg tablet, TAKE 1 TABLET BY MOUTH EVERYDAY AT BEDTIME, Disp: 90 tablet, Rfl: 1    rucaparib (RUBRACA) 300 mg tablet, Take 600 mg by mouth every 12 (twelve) hours Take with or without food  Do not repeat a vomited dose , Disp: , Rfl:     saccharomyces boulardii (FLORASTOR) 250 mg capsule, Take 1 capsule (250 mg total) by mouth 2 (two) times a day, Disp: , Rfl: 0    Sodium Chloride Flush (Normal Saline Flush) 0 9 % SOLN, , Disp: , Rfl:     sodium chloride, PF, 0 9 %, 10 mL by Intracatheter route daily for 30 doses 10cc syringes, Disp: 300 mL, Rfl: 3    venlafaxine (EFFEXOR) 75 mg tablet, Take 75 mg by mouth 3 (three) times a day  , Disp: , Rfl:   No current facility-administered medications for this visit       Facility-Administered Medications Ordered in Other Visits:     INV atezolizumab (INVESTIGATIONAL) 1,200 mg in sodium chloride 0 9 % 250 mL infusion, 1,200 mg, Intravenous, Once, Vivienne Velazquez MD    INV bevacizumab (INVESTIGATIONAL) 1,358 mg in sodium chloride 0 9 % 45 68 mL IVPB, 1,358 mg, Intravenous, Once, Vivienne Velazquez MD    sodium chloride 0 9 % infusion, 20 mL/hr, Intravenous, Continuous, Ruthy Pinedo MD    Most Recent Lab Results:  Lab Results   Component Value Date    WBC 7 95 02/20/2021    IRON 24 (L) 10/02/2019    TIBC 138 (L) 10/02/2019    FERRITIN 1,042 (H) 10/02/2019    CHOLESTEROL 171 02/20/2021    CHOL 183 10/27/2017    TRIG 88 11/01/2018    HDL 59 11/01/2018    LDLCALC 116 (H) 11/01/2018    ALT 22 02/20/2021    AST 13 02/20/2021    ALB 3 6 02/20/2021     10/27/2017     05/12/2017    SODIUM 134 (L) 02/20/2021    SODIUM 134 (L) 02/03/2021    K 4 1 02/20/2021    K 4 5 02/03/2021    CL 99 (L) 02/20/2021    BUN 24 02/20/2021    BUN 23 02/03/2021    CREATININE 1 09 02/20/2021    CREATININE 1 01 02/03/2021    EGFR 54 02/20/2021    PHOS 4 2 (H) 02/20/2021    PHOS 4 2 (H) 02/03/2021    GLUCOSE 219 (H) 12/19/2017    POCGLU 97 08/18/2020    GLUF 99 12/18/2020    GLUF 93 11/12/2020    GLUC 140 02/20/2021    HGBA1C 5 1 02/03/2020    HGBA1C 6 2 08/28/2019    HGBA1C 5 5 11/01/2018    CALCIUM 9 2 02/20/2021    FOLATE 5 4 10/06/2019    MG 1 9 02/20/2021     Anthropometric Measurements:   Height: 59"  Ht Readings from Last 1 Encounters:   02/22/21 4' 11" (1 499 m)     Wt Readings from Last 20 Encounters:   02/22/21 86 2 kg (190 lb)   02/03/21 88 5 kg (195 lb)   01/29/21 88 5 kg (195 lb)   01/11/21 88 5 kg (195 lb)   01/08/21 89 4 kg (197 lb)   12/21/20 90 3 kg (199 lb)   12/18/20 90 5 kg (199 lb 8 oz)   12/04/20 88 9 kg (196 lb)   11/25/20 89 1 kg (196 lb 8 oz)   11/04/20 87 1 kg (192 lb)   10/14/20 86 4 kg (190 lb 6 4 oz)   08/13/20 85 7 kg (189 lb)   08/06/20 86 9 kg (191 lb 9 6 oz)   08/05/20 88 1 kg (194 lb 3 2 oz)   07/30/20 88 5 kg (195 lb)   06/30/20 88 8 kg (195 lb 12 8 oz)   06/08/20 88 5 kg (195 lb)   05/06/20 83 9 kg (185 lb)   02/04/20 85 5 kg (188 lb 6 4 oz)   02/03/20 85 7 kg (189 lb)     Estimated body mass index is 38 38 kg/m² as calculated from the following:    Height as of an earlier encounter on 2/22/21: 4' 11" (1 499 m)  Weight as of an earlier encounter on 2/22/21: 86 2 kg (190 lb)  Weight Hx:  · Varian: (2/25/20) 185 6#, (3/3/20) 188 6#, (3/10/20) 187 2#, (3/19/20) 186 2#, (3/24/20) 187#, (3/31/20) 185 4#, (4/10/20) 184  4#   · Home Scale Wts: (2/19/20) 185#, (8/3/20) 194# - no recent home wts  · Usual Weight: 270# before RNYGB in 2001; lowest post-op wt: 200#; wt typically fluctuates between 215-230#  · Comments: loss of 5# (2 6%) in 1 month (not significant)     Oncology Nutrition-Anthropometrics      Most Recent Value   Patient age (years):  63 years   Patient (female) height (in):  61 in   Current Weight to be used for anthropometric calculations (lbs)  190 lbs   Current Weight to be used for anthropometric calculations (kg)  86 kg   IBW female:  95 lbs   IBW (kg) female:  43 2 kg   IBW % (female)  200 %   Adjusted BW (female):  119 lbs   Weight change after 1 month (lbs)  -5 lbs   Weight change after 3 months (lbs)  -7 lbs         Nutrition-Focused Physical Findings: none observed     Food/Nutrition-Related History & Client/Social History:    Current Nutrition Impact Symptoms:  [] Nausea   -dry heaves once daily, thinks this may be r/t not eating frequently enough [x] Reduced Appetite - forcing self to eat since last tx [] Acid Reflux   [] Vomiting  [x] Unintended Wt Loss - lost 5# this month d/t loss of appetite [] Malabsorption    [] Diarrhea  [] Unintended Wt Gain  [] Dumping Syndrome   [x] Constipation - +BM's 1x/day, using Miralax [] Thick Mucous/Secretions  [x] Abdominal Pain - thinks this may be r/t constipation   [] Dysgeusia (Altered Taste) [x] Xerostomia (Dry Mouth) - now using Biotene toothpaste and mouthwash  Just got dry mouth lozenges to try  Biotene has been effective  [] Gas    [] Dysosmia (Altered Smell)  [] Geoffery Hurl  [] Difficulty Chewing    [] Oral Mucositis (Sore Mouth)  [x] Fatigue  [] Other:    [] Odynophagia   [] Esophagitis  [] Other:    [] Dysphagia [] Early Satiety  [] No Problems Eating      Food Allergies: yes: had skin patch testing which showed allergy to strawberries, but says she is able to eat strawberries without any side effects  Food Intolerances: no    Current Diet: Regular Diet, No Restrictions and Small Frequent Meals (small meals are easier)  Current Nutrition Intake: Decreased since last visit  Appetite: Poor - has been forcing self to eat since last visit  Nutrition Route: PO  Meal planning/preparation mainly done by: mother does cooking; sister does shopping  Activity level: Mainly sedentary      24 Hr Diet Recall:   Breakfast: 1/2 of a de la garza, egg, and cheese biscuit with 32 oz iced tea  Lunch: 1 cup homemade chicken noodle and veggie soup  Snack: 6 olives  Dinner: 3 wedges of a chicken quesodilla with lettuce and tomato, 32 oz iced tea  Snack: 1 pc of banana bread    Beverages: water (16 9 oz x2), Butch' regular unsweetened tea (32 oz x1-2), hot herbal tea with milk and Splenda (10 oz x1)   -Does not drink coffee or alcohol      -Does not separate fluids from solids, does not have discomfort while eating and drinking at the same time    -Averaging ~42 oz caffeine-free fluids per day  Supplements:    None       Oncology Nutrition-Estimated Needs      Nutrition from 2020 in [facility]   Weight type used  Adjusted weight   Weight in pounds (lbs) used for estimated needs  119 lbs   Weight in kilograms (kg) used for estimated needs  54 1 kg   Energy needs based on 25 kcal/k kcal   Energy needs based on 30 kcal/k kcal   Protein needs based on 1 g/kg  54 g   Protein needs based on 1 2 g/k g   Fluid needs based on 25 mL/kg  1350 mL   Fluid needs in ounces  46 oz   Fluid needs based on 30 mL/kg  1620 mL   Fluid needs in ounces  55 oz        Discussion & Intervention:    Vickey Reddy was evaluated today for an RD follow up regarding pt request for dietitian involvement throughout tx  Vickey Reddy is currently undergoing tx for endometrial cancer  She reports that she is very tired today and concerned about her recent wt loss (5# x1 month)  She says her appetite has been reduced, she is finding that she has to force herself to eat, and she is having more constipation and dry heaves  She thinks that her dry heaves may be r/t not eating enough  She has also been experiencing xerostomia for which Biotene has provided relief  Her diet recall reveals and inadequate kcal, protein, and fluid intake  She does not like oral nutrition supplements  Reviewed the importance of wt management throughout the tx process and the role of a high kcal/ high protein diet and a cancer preventative diet in managing wt and overall health    Discussed ways to optimize nutrient intake, suggestions include: eating smaller/more frequent meals, including high protein foods in diet (eggs, chicken, fish, beans/legumes, nuts/nut butters, etc ), including high fiber foods in diet (fruits, vegetables, whole grains, beans, etc ), eating when feeling most hungry, utilizing oral nutrition supplements and choosing a variety of colorful, plant-based foods  Reviewed 24 hour recall, which revealed an inadequate po intake, and discussed ways to increase kcal, protein, and fluid intakes and transition to a cancer preventative diet, suggestions include: adding cheese to foods such as eggs, mashed potatoes, casseroles, etc , making oatmeal with whole milk rather than water, starting oral nutrition supplements - commercial or homemade, adding nut butter to foods, having a protein source at all meals and snacks, having a fiber source at all meals and snacks, trying new recipes, cooking at home more often, fortify foods for added kcal and protein, modifying foods to increase their palatability  and having consistent and planned snacks between meals  Also discussed: weight management, indications & use of oral nutrition supplements, high protein foods to include at all meals & snacks, high fiber foods to include at all meals & snacks, ways to increase overall calorie intake, encouraged eating every 2-3 hours (5-6 small meals/day), proper oral care, adequate hydation & tips to increase overall fluid intake, MNT for: constipation, reduced appetite, dry heaves, xerostomia, wt loss, recipe suggestions/resources and a cancer preventative eating pattern as a long-term nutrition goal   We reviewed tips for meal planning and ways to incorporate healthy foods of preference into her eating pattern  Moving forward, Karen Canchola plans to and was encouraged to eat 5-6 small meals per day (every 2-3 hrs), make sure there is a protein source at all eating occasions (cheese sticks, [place] yogurt, eggs, nut butter, nuts, shrimp), increase her po fluid intake to at least 55 oz/day, and continue working on increasing her fiber intake  We will follow up in ~3 weeks    Materials Provided: not applicable  All questions and concerns addressed during todays visit  Vickey Reddy has RD contact information  Nutrition Diagnosis:  Inadequate Energy Intake related to physiological causes, disease state and treatment related issues as evidenced by food recall, wt loss and discussion with pt and/or family  Increased Nutrient Needs (kcal & pro) related to increased demand for nutrients and disease state as evidenced by cancer dx and pt undergoing tx for cancer  Altered GI Function related to alteration in GI tract motility as evidenced by Constipation  Behavorial/Environmental- Knowledge & Beliefs: Limited adherence to nutrition-related recommendations related to behavioral causes as evidenced by Per pt interview, has not acheived nutrition goals set over multiple sessions  Monitoring & Evaluation:   Goals:  · weight maintenance/stabilization  · adequate nutrition impact symptom management  · pt to meet >/=75% estimated nutrition needs daily   · Continue lifelong vitamin/mineral supplementation for RNY Gastric Bypass   · Increase fiber intake  · Increase protein intake  · Eat 5-6 small meals per day  · Increase fluid intake    · Progress Towards Goals: Progressing and New Goal(s) Added    Barriers: None  Readiness to change: action  Comprehension: needs reinforcement and verbalizes understanding  Expected Compliance: fair      Nutrition Rx & Recommendations:  · Small, frequent meals/snacks may be easier to tolerate than 3 large daily meals  Aim for 5-6 small meals per day (every 2-3 hours)  · Include protein at all meals/snacks  · Follow a Cancer Preventative Nutrition Pattern: colorful, plant-based, low-fat, avoid added sugars, limit alcohol, include high fiber foods, limit processed meats, limit red meat, choose lean protein sources, use low-fat cooking methods, balance calories with physical activity, avoid excessive weight gain throughout life  · Incorporate physical activity as able/allowed    · For weight loss: monitor your weight at home at least 2x/week, record your weight, start by adding 250-500 extra calories per day, eat 5-6 small scheduled meals every 2-3 hrs, choose foods that are high in protein and calories (see pages 49-53 in your Eating Hints book), drink liquids with calories for example: milkshakes/smoothies/juice/soup/whole milk/chocolate milk, cook with protein fortified milk (see recipe on page 36 in your Eating Hints book), consider ready-to-drink oral nutrition supplements such as Ensure Plus, Ensure Enlive, Boost Plus, or Boost Very High Calorie, avoid "diet" and "light" foods when possible, avoid drinking too much with meals, contact your dietitian with any continued weight loss over the course of 1 week  For more info see pages 35-37 in your Eating Hints book  · For constipation: drink plenty of fluids (>64 oz/day); drink hot liquids; eat high-fiber foods as tolerated (whole grains, beans, peas, nuts, seeds, fruits, vegetables, etc); increase physical activity as tolerated  Avoid increasing fiber intake too quickly, add fiber into your diet slowly; keep a record of your bowel movements (see pages 13-14 in you Eating Hints book)  · For dry mouth: sip water throughout the day; try very sweet (or tart, as tolerated) drinks; chew gum or suck on hard candy, popsicles, & ice chips; eat easy to swallow foods (such as pureed cooked foods or soups); moisten food with sauce/gravy/salad dressing; do not drink beer, wine, or any type of alcohol; keep lips moist with lip balm; avoid tobacco products & second-hand smoke; try Biotene as needed (see pages 17-18 in your Eating Hints book)  Follow proper oral care; Try baking soda/salt water rinse recipe (mix 3/4 tsp salt + 1 tsp baking soda + 1 qt water; rinse with pain water after using) in Eating Hints book (pg 18)  Brush your teeth before/after meals & before bed  · Fluid Goal: 46-55 oz per day  Aim for at least 55 oz per day while having constipation    · Work on meal planning ahead of time   · Lifetime vitamin/mineral supplementation recommended for Gastric Bypass Surgery:  · Multivitamin BID  · Iron 45 mg daily  · Vitamin D daily  · Calcium Citrate 500 mg TID (do not take with iron, needs to be 2 hrs apart from iron)  · B12 1000 mcg daily    · Resources for recipes: Advaxis, cancerdietitian  com, [place]/support/nutrition-and-cancer, Skinnytaste  com, www oncologynutrition  org/erfc/recipes, www [employer]  org, The Essential Cancer Treatment Nutrition Guide and Cookbook: Includes 530 Healthy and Delicious Recipes  Personalized Goals:  1  Consume 5-6 small meals per day, eat something every 2-3 hrs  2  Make sure you have a protein source at all eating occasions  Good options include: [place] yogurt, cheese, eggs, nuts, nut butter, chicken, fish, cheese sticks, shrimp, etc )  3  Increase fluids to at least 55 oz per day  4  Continue to work on increasing your fiber intake: vegetables, nuts, oatmeal made with milk, fresh fruit, whole grains      Follow Up Plan: 3/15/21 during infusion   Recommend Referral to Other Providers: none at this time

## 2021-02-16 ENCOUNTER — TELEMEDICINE (OUTPATIENT)
Dept: UROLOGY | Facility: AMBULATORY SURGERY CENTER | Age: 66
End: 2021-02-16
Payer: COMMERCIAL

## 2021-02-16 DIAGNOSIS — N13.5 OBSTRUCTION OF RIGHT URETER: Primary | ICD-10-CM

## 2021-02-16 DIAGNOSIS — N32.81 OVERACTIVE BLADDER: ICD-10-CM

## 2021-02-16 PROCEDURE — 99213 OFFICE O/P EST LOW 20 MIN: CPT | Performed by: NURSE PRACTITIONER

## 2021-02-16 NOTE — PROGRESS NOTES
Virtual Regular Visit      Assessment/Plan:    Problem List Items Addressed This Visit        Genitourinary    Obstruction of right ureter - Primary      Patient's right-sided nephrostomy tube will remain on CT and draining dependently  This is managed by Interventional Radiology  Her next right nephrostomy tube exchanged is scheduled in early March  Continue management for history of endometrial cancer  Overactive bladder       Patient denies any urinary complaints at this time  She will remain on oxybutynin and Myrbetriq daily  Follow-up in 4 months for re-evaluation  She was instructed to call sooner with any issues  Reason for visit is   Chief Complaint   Patient presents with    Virtual Regular Visit        Encounter provider RAFITA River    Provider located at [place] [address]  [facility] [place] [address]      Recent Visits  No visits were found meeting these conditions  Showing recent visits within past 7 days and meeting all other requirements     Today's Visits  Date Type Provider Dept   02/16/21 [id number] RAFITA [facility] [place]   Showing today's visits and meeting all other requirements     Future Appointments  No visits were found meeting these conditions  Showing future appointments within next 150 days and meeting all other requirements   It was my intent to perform this visit via video technology but the patient was not able to do a video connection so the visit was completed via audio telephone only  The patient was identified by name and date of birth  Orlando Thurston was informed that this is a telemedicine visit and that the visit is being conducted through telephone  My office door was closed  No one else was in the room  She acknowledged consent and understanding of privacy and security of the video platform   The patient has agreed to participate and understands they can discontinue the visit at any time  Patient is aware this is a billable service  Subjective  Sammi Clay is a 63 y o  female   With history of right-sided hydronephrosis and ureteral obstruction secondary to endometrial cancer  She is currently receiving chemotherapy and undergoing clinical trial for advancement of disease  Patient has had a right-sided nephrostomy tube for management of chronic hydronephrosis  Her last nephrostomy tube exchange was performed on 12/04/2020  She states her next exchange is tentatively scheduled for 03/05/2021  Patient states her nephrostomy tube is currently draining dependently  She has had no episodes of flank pain or gross hematuria  Patient has had no recent urinary tract infections  She remains on oxybutynin and Myrbetriq daily which is managing her episodes of urinary incontinence and urgency          Past Medical History:   Diagnosis Date    Anemia     Chemotherapy follow-up examination     Depression     Endometrial cancer ([facility] [address] ) 12/2017    Hyperglycemia     vx type 2 dm -- last assessed 4/1/14; resolved 11/7/17    Hyperlipidemia     Hypertension     Obesity     last assessed 10/14/17; resolved 11/7/17       Past Surgical History:   Procedure Laterality Date    ABDOMINAL SURGERY      GASTRIC BYPASS    CHOLECYSTECTOMY      at the time of gastric bypass    COLONOSCOPY      CT NEEDLE BIOPSY LYMPH NODE  7/8/2019    FL GUIDED CENTRAL VENOUS ACCESS DEVICE INSERTION  11/12/2019    GASTRIC BYPASS      HYSTERECTOMY Bilateral     total abdominal with salpingo-oophorectomy    IR NEPHROSTOMY TUBE PLACEMENT  7/26/2019    IR PICC LINE  9/27/2019    IR PORT PLACEMENT  7/26/2019    IR PORT REMOVAL  9/20/2019    OOPHORECTOMY Bilateral     [place] INSJ TUNNELED CTR VAD W/SUBQ PORT AGE 5 YR/> Left 11/12/2019    Procedure: INSERTION VENOUS PORT ( PORT-A-CATH) IR;  Surgeon: Anthony Sebastian DO;  [facility];  Service: Interventional Radiology    PA LAP, RADICAL HYST W/ TUBE&OV, NODE BX N/A 12/19/2017    Procedure: HYSTERECTOMY LAPAROSCOPIC TOTAL ([address]) W/ ROBOTICS; BILATERAL SALPINGOOPHERECTOMY; LYMPH NODE DISSECTION; lysis of adhesions;  Surgeon: Sammy Acosta MD;  [facility];  Service: Gynecology Oncology    PA LAP,DIAGNOSTIC ABDOMEN N/A 12/19/2017    Procedure: LAPAROSCOPY DIAGNOSTIC;  Surgeon: Sammy Acosta MD;  [facility];  Service: Gynecology Oncology    TONSILLECTOMY      US GUIDED BREAST BIOPSY RIGHT COMPLETE Right 6/28/2019       Current Outpatient Medications   Medication Sig Dispense Refill    acetaminophen (TYLENOL) 325 mg tablet Take 2 tablets (650 mg total) by mouth every 6 (six) hours as needed for mild pain, headaches or fever 30 tablet 0    ARIPiprazole (ABILIFY) 10 mg tablet Take 10 mg by mouth daily      aspirin (ECOTRIN LOW STRENGTH) 81 mg EC tablet Take 81 mg by mouth daily      Calcium-Magnesium-Vitamin D (CALCIUM 500 PO) Take by mouth daily       cholecalciferol (VITAMIN D3) 1,000 units tablet Take 1,000 Units by mouth daily      Cyanocobalamin (VITAMIN B12 PO) Take by mouth daily       cyclobenzaprine (FLEXERIL) 10 mg tablet Take 1 tablet (10 mg total) by mouth daily at bedtime as needed for muscle spasms 30 tablet 1    enoxaparin (LOVENOX) 120 mg/0 8 mL INJECT 0 8 ML (120 MG TOTAL) UNDER THE SKIN DAILY 30 Syringe 3    folic acid (FOLVITE) 1 mg tablet Take 1 tablet (1 mg total) by mouth daily  0    gabapentin (NEURONTIN) 300 mg capsule Take 1 capsule (300 mg total) by mouth 2 (two) times a day 60 capsule 1    gemfibrozil (LOPID) 600 mg tablet TAKE 1 TABLET BY MOUTH EVERY DAY 90 tablet 1    Multiple Vitamin (MULTIVITAMIN) tablet Take 1 tablet by mouth daily      Myrbetriq 50 MG TB24 TAKE 1 TABLET BY MOUTH EVERY DAY 30 tablet 5    omeprazole (PriLOSEC) 20 mg delayed release capsule Take 20 mg by mouth daily      ondansetron (ZOFRAN) 4 mg tablet Take 1 tablet (4 mg total) by mouth every 8 (eight) hours as needed for nausea or vomiting 30 tablet 0    oxybutynin (DITROPAN XL) 15 MG 24 hr tablet Take 1 tablet (15 mg total) by mouth daily at bedtime 90 tablet 3    quinapril (ACCUPRIL) 5 mg tablet TAKE 1 TABLET BY MOUTH EVERYDAY AT BEDTIME 90 tablet 1    rucaparib (RUBRACA) 300 mg tablet Take 600 mg by mouth every 12 (twelve) hours Take with or without food  Do not repeat a vomited dose   saccharomyces boulardii (FLORASTOR) 250 mg capsule Take 1 capsule (250 mg total) by mouth 2 (two) times a day  0    Sodium Chloride Flush (Normal Saline Flush) 0 9 % SOLN       sodium chloride, PF, 0 9 % 10 mL by Intracatheter route daily for 30 doses 10cc syringes 300 mL 3    traMADol (ULTRAM) 50 mg tablet Take 1 tablet (50 mg total) by mouth every 6 (six) hours as needed for moderate pain 60 tablet 0    venlafaxine (EFFEXOR) 75 mg tablet Take 75 mg by mouth 3 (three) times a day         No current facility-administered medications for this visit  Allergies   Allergen Reactions    Cephalosporins Rash     Which Cephalosporin reaction was to not specified; however, has tolerated Amoxicillin, Cefazolin, and Cefepime       Review of Systems   Constitutional: Negative  HENT: Negative  Respiratory: Negative  Cardiovascular: Negative  Gastrointestinal: Negative  Genitourinary: Negative for decreased urine volume, difficulty urinating, dysuria, flank pain, frequency, hematuria, pelvic pain, urgency, vaginal bleeding, vaginal discharge and vaginal pain  Musculoskeletal: Negative  Skin: Negative  Neurological: Negative  Psychiatric/Behavioral: Negative  Video Exam    There were no vitals filed for this visit      Physical Exam     I spent 15 minutes with patient today in which greater than 50% of the time was spent in counseling/coordination of care regarding plan of care      VIRTUAL VISIT DISCLAIMER    Tonya Ho acknowledges that she has consented to an online visit or consultation  She understands that the online visit is based solely on information provided by her, and that, in the absence of a face-to-face physical evaluation by the physician, the diagnosis she receives is both limited and provisional in terms of accuracy and completeness  This is not intended to replace a full medical face-to-face evaluation by the physician  Sammi Clay understands and accepts these terms

## 2021-02-16 NOTE — ASSESSMENT & PLAN NOTE
Patient denies any urinary complaints at this time  She will remain on oxybutynin and Myrbetriq daily

## 2021-02-16 NOTE — ASSESSMENT & PLAN NOTE
Patient's right-sided nephrostomy tube will remain on CT and draining dependently  This is managed by Interventional Radiology  Her next right nephrostomy tube exchanged is scheduled in early March  Continue management for history of endometrial cancer

## 2021-02-17 ENCOUNTER — HOSPITAL ENCOUNTER (OUTPATIENT)
Dept: INFUSION CENTER | Facility: HOSPITAL | Age: 66
Discharge: HOME | End: 2021-02-17

## 2021-02-18 DIAGNOSIS — C54.1 ENDOMETRIAL CANCER (HCC): Primary | ICD-10-CM

## 2021-02-19 ENCOUNTER — TELEMEDICINE (OUTPATIENT)
Dept: GYNECOLOGIC ONCOLOGY | Facility: CLINIC | Age: 66
End: 2021-02-19
Payer: COMMERCIAL

## 2021-02-19 ENCOUNTER — HOSPITAL ENCOUNTER (OUTPATIENT)
Dept: INFUSION CENTER | Facility: HOSPITAL | Age: 66
End: 2021-02-19

## 2021-02-19 DIAGNOSIS — T45.1X5A CHEMOTHERAPY-INDUCED NAUSEA: ICD-10-CM

## 2021-02-19 DIAGNOSIS — C54.1 ENDOMETRIAL CANCER (HCC): Primary | Chronic | ICD-10-CM

## 2021-02-19 DIAGNOSIS — K59.03 DRUG INDUCED CONSTIPATION: ICD-10-CM

## 2021-02-19 DIAGNOSIS — I82.4Y1 ACUTE DEEP VEIN THROMBOSIS (DVT) OF PROXIMAL VEIN OF RIGHT LOWER EXTREMITY (HCC): ICD-10-CM

## 2021-02-19 DIAGNOSIS — R11.0 CHEMOTHERAPY-INDUCED NAUSEA: ICD-10-CM

## 2021-02-19 PROCEDURE — 99214 OFFICE O/P EST MOD 30 MIN: CPT | Performed by: NURSE PRACTITIONER

## 2021-02-19 RX ORDER — LORAZEPAM 0.5 MG/1
0.5 TABLET ORAL EVERY 6 HOURS PRN
Qty: 36 TABLET | Refills: 1 | Status: SHIPPED | OUTPATIENT
Start: 2021-02-19 | End: 2021-08-06 | Stop reason: SDUPTHER

## 2021-02-19 RX ORDER — SODIUM CHLORIDE 9 MG/ML
20 INJECTION, SOLUTION INTRAVENOUS CONTINUOUS
Status: DISCONTINUED | OUTPATIENT
Start: 2021-02-22 | End: 2021-02-25 | Stop reason: HOSPADM

## 2021-02-19 NOTE — PROGRESS NOTES
Virtual Regular Visit      Assessment/Plan:    Problem List Items Addressed This Visit        Digestive    Drug induced constipation     Continue Miralax daily  Colace TID PRN  Cardiovascular and Mediastinum    Acute deep vein thrombosis (DVT) of proximal vein of lower extremity (HCC)     Continue daily Lovenox  Genitourinary    Endometrial cancer ([facility] [address] ) - Primary (Chronic)     63year-old with recurrent stage IB endometrial cancer who is s/p cycle 4 of treatment on the EndoBARR trial  Constipation continues to be an issue  She is generally able to manage this with Miralax  She had one episode of nausea with vomiting overnight  This has not been a chronic issue for her  Her performance status is 1  Patient will proceed with treatment on Monday  Labs to be performed tomorrow  She will return to the office as per clinical trial scheduling  Other    Chemotherapy-induced nausea     Will add lorazepam  Patient currently has Zofran which she does find helpful  Cautioned with driving as lorazepam can cause drowsiness  Relevant Medications    LORazepam (ATIVAN) 0 5 mg tablet               Reason for visit is pre-chemo visit  Chief Complaint   Patient presents with    Virtual Regular Visit        Encounter provider RAFITA Crane    Provider located at [address] [address] [phone]      Recent Visits  No visits were found meeting these conditions  Showing recent visits within past 7 days and meeting all other requirements     Today's Visits  Date Type Provider Dept   02/19/21 Telemedicine Elisabeth De, [address] [place] [address] today's visits and meeting all other requirements     Future Appointments  No visits were found meeting these conditions     Showing future appointments within next 150 days and meeting all other requirements        Oncology History   Endometrial cancer ([facility] [address] )   11/17/2017 Initial Diagnosis    Endometrial cancer ([facility] [address] )     11/17/2017 Biopsy    ENDOMETRIAL BIOPSY: WELL DIFFERENTIATED ENDOMETRIAL ADENOCARCINOMA (FIGO I) WITH FOCALMUCINOUS FEATURES  Part B: ENDOCERVICAL POLYP:BENIGN ENDOCERVICAL      12/19/2017 Surgery    Robotic assisted total laparoscopic hysterectomy with bilateral salpingo-oophorectomy and sentinel bilateral pelvic lymph node dissection  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 (4 4 x 3 2 cm tumor, 9 4/15 4mm invasion, NO LVSI, washings revealed atypical cellular changes)     12/19/2017 Genetic Testing    Morrison testing negative     6/28/2019 Biopsy    A  Breast, Right, US BX Right Breast 1000 4 cmfn:  - Benign breast tissue with focal histiocytic aggregate  See comment   - Negative for atypia and in-situ or invasive carcinoma  7/8/2019 Recurrence    Presented with right lower extremity DVT and CT demonstrating right pelvic sidewall mass with venous, ureteral and nerve compression causing significant neuropathic pain  Biopsy:  Lymph Node, Right pelvic lymph node x3:  - High-grade adenocarcinoma  7/30/2019 - 1/6/2020 Chemotherapy    Taxol 175 mg/m2 and carboplatin AUC 6 every 21 days  Dose was reduced to taxol 135 mg/m2 and carboplatin AUC 5  Completed 6 cycles  Treatment protracted due to multiple hospital admissions  2/24/2020 - 4/13/2020 Radiation    adjuvant external beam radiation therapy to the whole pelvis to 4500 cGy followed by boost to gross disease of an additional 2200 cGy     11/23/2020 Progression    New necrotic adenopathy in the retroperitoneum on CT      12/21/2020 -  Chemotherapy    Began chemotherapy with rucaparib, atezolizumab, and bevacizumab as per [facility] clinical trial          The patient was identified by name and date of birth   Orlando Thurston was informed that this is a telemedicine visit and that the visit is being conducted through [facility] and patient was informed that this is a secure, HIPAA-compliant platform  She agrees to proceed     My office door was closed  No one else was in the room  She acknowledged consent and understanding of privacy and security of the video platform  The patient has agreed to participate and understands they can discontinue the visit at any time  Patient is aware this is a billable service  Subjective  Adriana Angel is a 63 y o  female      Patient had single episode of vomiting overnight  She feels normal this morning  She is taking Zofran  She denies nausea presently  Her appetite is appropriate  Normal bladder function  She takes Miralax for constipation  She denies abdominal or pelvic pain  She is without vaginal bleeding or discharge  She is ambulatory           Past Medical History:   Diagnosis Date    Anemia     Chemotherapy follow-up examination     Depression     Endometrial cancer ([facility] [address] ) 12/2017    Hyperglycemia     vx type 2 dm -- last assessed 4/1/14; resolved 11/7/17    Hyperlipidemia     Hypertension     Obesity     last assessed 10/14/17; resolved 11/7/17       Past Surgical History:   Procedure Laterality Date    ABDOMINAL SURGERY      GASTRIC BYPASS    CHOLECYSTECTOMY      at the time of gastric bypass    COLONOSCOPY      CT NEEDLE BIOPSY LYMPH NODE  7/8/2019    FL GUIDED CENTRAL VENOUS ACCESS DEVICE INSERTION  11/12/2019    GASTRIC BYPASS      HYSTERECTOMY Bilateral     total abdominal with salpingo-oophorectomy    IR NEPHROSTOMY TUBE PLACEMENT  7/26/2019    IR PICC LINE  9/27/2019    IR PORT PLACEMENT  7/26/2019    IR PORT REMOVAL  9/20/2019    OOPHORECTOMY Bilateral     [place] INSJ TUNNELED CTR VAD W/SUBQ PORT AGE 5 YR/> Left 11/12/2019    Procedure: INSERTION VENOUS PORT ( PORT-A-CATH) IR;  Surgeon: Arlin Romano DO;  [facility];  Service: Interventional Radiology    [place] LAP, RADICAL HYST W/ TUBE&OV, NODE BX N/A 12/19/2017    Procedure: HYSTERECTOMY LAPAROSCOPIC TOTAL ([address]) W/ ROBOTICS; BILATERAL SALPINGOOPHERECTOMY; LYMPH NODE DISSECTION; lysis of adhesions;  Surgeon: Gibson Severs, MD;  [facility];  Service: Gynecology Oncology    [place] LAP,DIAGNOSTIC ABDOMEN N/A 12/19/2017    Procedure: LAPAROSCOPY DIAGNOSTIC;  Surgeon: Gibson Severs, MD;  [facility];  Service: Gynecology Oncology    TONSILLECTOMY      US GUIDED BREAST BIOPSY RIGHT COMPLETE Right 6/28/2019       Current Outpatient Medications   Medication Sig Dispense Refill    acetaminophen (TYLENOL) 325 mg tablet Take 2 tablets (650 mg total) by mouth every 6 (six) hours as needed for mild pain, headaches or fever 30 tablet 0    ARIPiprazole (ABILIFY) 10 mg tablet Take 10 mg by mouth daily      aspirin (ECOTRIN LOW STRENGTH) 81 mg EC tablet Take 81 mg by mouth daily      Calcium-Magnesium-Vitamin D (CALCIUM 500 PO) Take by mouth daily       cholecalciferol (VITAMIN D3) 1,000 units tablet Take 1,000 Units by mouth daily      Cyanocobalamin (VITAMIN B12 PO) Take by mouth daily       enoxaparin (LOVENOX) 120 mg/0 8 mL INJECT 0 8 ML (120 MG TOTAL) UNDER THE SKIN DAILY 30 Syringe 3    folic acid (FOLVITE) 1 mg tablet Take 1 tablet (1 mg total) by mouth daily  0    gemfibrozil (LOPID) 600 mg tablet TAKE 1 TABLET BY MOUTH EVERY DAY 90 tablet 1    LORazepam (ATIVAN) 0 5 mg tablet Take 1 tablet (0 5 mg total) by mouth every 6 (six) hours as needed for anxiety (or nausea) 36 tablet 1    Multiple Vitamin (MULTIVITAMIN) tablet Take 1 tablet by mouth daily      Myrbetriq 50 MG TB24 TAKE 1 TABLET BY MOUTH EVERY DAY 30 tablet 5    omeprazole (PriLOSEC) 20 mg delayed release capsule Take 20 mg by mouth daily      ondansetron (ZOFRAN) 4 mg tablet Take 1 tablet (4 mg total) by mouth every 8 (eight) hours as needed for nausea or vomiting 30 tablet 0    oxybutynin (DITROPAN XL) 15 MG 24 hr tablet Take 1 tablet (15 mg total) by mouth daily at bedtime 90 tablet 3    quinapril (ACCUPRIL) 5 mg tablet TAKE 1 TABLET BY MOUTH EVERYDAY AT BEDTIME 90 tablet 1    rucaparib (RUBRACA) 300 mg tablet Take 600 mg by mouth every 12 (twelve) hours Take with or without food  Do not repeat a vomited dose   saccharomyces boulardii (FLORASTOR) 250 mg capsule Take 1 capsule (250 mg total) by mouth 2 (two) times a day  0    Sodium Chloride Flush (Normal Saline Flush) 0 9 % SOLN       sodium chloride, PF, 0 9 % 10 mL by Intracatheter route daily for 30 doses 10cc syringes 300 mL 3    venlafaxine (EFFEXOR) 75 mg tablet Take 75 mg by mouth 3 (three) times a day         No current facility-administered medications for this visit  Allergies   Allergen Reactions    Cephalosporins Rash     Which Cephalosporin reaction was to not specified; however, has tolerated Amoxicillin, Cefazolin, and Cefepime       Review of Systems   Constitutional: Negative for activity change, appetite change, chills, fatigue, fever and unexpected weight change  HENT: Negative for mouth sores  Eyes: Negative  Respiratory: Negative for cough, chest tightness, shortness of breath and wheezing  Cardiovascular: Negative for chest pain, palpitations and leg swelling  Gastrointestinal: Positive for constipation and nausea  Negative for abdominal distention, abdominal pain, anal bleeding, blood in stool, diarrhea and vomiting  Endocrine: Negative  Genitourinary: Negative for difficulty urinating, dysuria, flank pain, frequency, hematuria, pelvic pain, urgency, vaginal bleeding, vaginal discharge and vaginal pain  Musculoskeletal: Negative for arthralgias and myalgias  Skin: Negative for color change, pallor and rash  Neurological: Negative for dizziness, weakness, numbness and headaches  Hematological: Negative  Psychiatric/Behavioral: The patient is not nervous/anxious  Video Exam    There were no vitals filed for this visit  Physical Exam  Constitutional:       General: She is not in acute distress  Appearance: Normal appearance  She is not ill-appearing  HENT:      Head: Normocephalic and atraumatic  Eyes:      General: No scleral icterus  Right eye: No discharge  Left eye: No discharge  Conjunctiva/sclera: Conjunctivae normal    Pulmonary:      Effort: Pulmonary effort is normal    Skin:     Coloration: Skin is not jaundiced  Findings: No rash  Neurological:      General: No focal deficit present  Mental Status: She is alert and oriented to person, place, and time  Motor: No weakness  Psychiatric:         Mood and Affect: Mood normal          Behavior: Behavior normal          Thought Content: Thought content normal          Judgment: Judgment normal           I spent 15 minutes directly with the patient during this visit      VIRTUAL VISIT DISCLAIMER    Tonya Winstont acknowledges that she has consented to an online visit or consultation  She understands that the online visit is based solely on information provided by her, and that, in the absence of a face-to-face physical evaluation by the physician, the diagnosis she receives is both limited and provisional in terms of accuracy and completeness  This is not intended to replace a full medical face-to-face evaluation by the physician  Isabelle Wilson understands and accepts these terms

## 2021-02-19 NOTE — ASSESSMENT & PLAN NOTE
Will add lorazepam  Patient currently has Zofran which she does find helpful  Cautioned with driving as lorazepam can cause drowsiness

## 2021-02-19 NOTE — ASSESSMENT & PLAN NOTE
73-year-old with recurrent stage IB endometrial cancer who is s/p cycle 4 of treatment on the EndoBARR trial  Constipation continues to be an issue  She is generally able to manage this with Miralax  She had one episode of nausea with vomiting overnight  This has not been a chronic issue for her  Her performance status is 1  Patient will proceed with treatment on Monday  Labs to be performed tomorrow  She will return to the office as per clinical trial scheduling

## 2021-02-20 ENCOUNTER — HOSPITAL ENCOUNTER (OUTPATIENT)
Dept: INFUSION CENTER | Facility: CLINIC | Age: 66
Discharge: HOME | End: 2021-02-20
Payer: COMMERCIAL

## 2021-02-20 VITALS — TEMPERATURE: 96.8 F

## 2021-02-20 DIAGNOSIS — Z45.2 ENCOUNTER FOR CENTRAL LINE CARE: ICD-10-CM

## 2021-02-20 DIAGNOSIS — C54.1 ENDOMETRIAL CANCER (HCC): Primary | ICD-10-CM

## 2021-02-20 LAB
ALBUMIN SERPL BCP-MCNC: 3.6 G/DL (ref 3.5–5)
ALP SERPL-CCNC: 174 U/L (ref 46–116)
ALT SERPL W P-5'-P-CCNC: 22 U/L (ref 12–78)
AMYLASE SERPL-CCNC: 29 IU/L (ref 25–115)
ANION GAP SERPL CALCULATED.3IONS-SCNC: 13 MMOL/L (ref 4–13)
APTT PPP: 52 SECONDS (ref 23–37)
AST SERPL W P-5'-P-CCNC: 13 U/L (ref 5–45)
BACTERIA UR QL AUTO: ABNORMAL /HPF
BASOPHILS # BLD AUTO: 0.05 THOUSANDS/ΜL (ref 0–0.1)
BASOPHILS NFR BLD AUTO: 1 % (ref 0–1)
BILIRUB SERPL-MCNC: 0.28 MG/DL (ref 0.2–1)
BILIRUB UR QL STRIP: NEGATIVE
BUN SERPL-MCNC: 24 MG/DL (ref 5–25)
CALCIUM SERPL-MCNC: 9.2 MG/DL (ref 8.3–10.1)
CHLORIDE SERPL-SCNC: 99 MMOL/L (ref 100–108)
CHOLEST SERPL-MCNC: 171 MG/DL (ref 50–200)
CLARITY UR: ABNORMAL
CO2 SERPL-SCNC: 22 MMOL/L (ref 21–32)
COLOR UR: YELLOW
CREAT SERPL-MCNC: 1.09 MG/DL (ref 0.6–1.3)
EOSINOPHIL # BLD AUTO: 0.02 THOUSAND/ΜL (ref 0–0.61)
EOSINOPHIL NFR BLD AUTO: 0 % (ref 0–6)
ERYTHROCYTE [DISTWIDTH] IN BLOOD BY AUTOMATED COUNT: 14 % (ref 11.6–15.1)
GFR SERPL CREATININE-BSD FRML MDRD: 54 ML/MIN/1.73SQ M
GGT SERPL-CCNC: 15 U/L (ref 5–85)
GLUCOSE SERPL-MCNC: 140 MG/DL (ref 65–140)
GLUCOSE UR STRIP-MCNC: NEGATIVE MG/DL
HCT VFR BLD AUTO: 34.2 % (ref 34.8–46.1)
HGB BLD-MCNC: 10.6 G/DL (ref 11.5–15.4)
HGB UR QL STRIP.AUTO: ABNORMAL
IMM GRANULOCYTES # BLD AUTO: 0.1 THOUSAND/UL (ref 0–0.2)
IMM GRANULOCYTES NFR BLD AUTO: 1 % (ref 0–2)
INR PPP: 1.16 (ref 0.84–1.19)
KETONES UR STRIP-MCNC: NEGATIVE MG/DL
LEUKOCYTE ESTERASE UR QL STRIP: ABNORMAL
LIPASE SERPL-CCNC: 81 U/L (ref 73–393)
LYMPHOCYTES # BLD AUTO: 0.85 THOUSANDS/ΜL (ref 0.6–4.47)
LYMPHOCYTES NFR BLD AUTO: 11 % (ref 14–44)
MAGNESIUM SERPL-MCNC: 1.9 MG/DL (ref 1.6–2.6)
MCH RBC QN AUTO: 29.5 PG (ref 26.8–34.3)
MCHC RBC AUTO-ENTMCNC: 31 G/DL (ref 31.4–37.4)
MCV RBC AUTO: 95 FL (ref 82–98)
MONOCYTES # BLD AUTO: 0.86 THOUSAND/ΜL (ref 0.17–1.22)
MONOCYTES NFR BLD AUTO: 11 % (ref 4–12)
NEUTROPHILS # BLD AUTO: 6.07 THOUSANDS/ΜL (ref 1.85–7.62)
NEUTS SEG NFR BLD AUTO: 76 % (ref 43–75)
NITRITE UR QL STRIP: NEGATIVE
NON-SQ EPI CELLS URNS QL MICRO: ABNORMAL /HPF
NRBC BLD AUTO-RTO: 0 /100 WBCS
PH UR STRIP.AUTO: 6 [PH]
PHOSPHATE SERPL-MCNC: 4.2 MG/DL (ref 2.3–4.1)
PLATELET # BLD AUTO: 337 THOUSANDS/UL (ref 149–390)
PMV BLD AUTO: 10 FL (ref 8.9–12.7)
POTASSIUM SERPL-SCNC: 4.1 MMOL/L (ref 3.5–5.3)
PROT SERPL-MCNC: 8.3 G/DL (ref 6.4–8.2)
PROT UR STRIP-MCNC: ABNORMAL MG/DL
PROTHROMBIN TIME: 14.9 SECONDS (ref 11.6–14.5)
RBC # BLD AUTO: 3.59 MILLION/UL (ref 3.81–5.12)
RBC #/AREA URNS AUTO: ABNORMAL /HPF
SODIUM SERPL-SCNC: 134 MMOL/L (ref 136–145)
SP GR UR STRIP.AUTO: 1.02 (ref 1–1.03)
T3 SERPL-MCNC: 1.4 NG/ML (ref 0.6–1.8)
T4 FREE SERPL-MCNC: 1.18 NG/DL (ref 0.76–1.46)
TSH SERPL DL<=0.05 MIU/L-ACNC: 1.45 UIU/ML (ref 0.36–3.74)
UROBILINOGEN UR QL STRIP.AUTO: 0.2 E.U./DL
WBC # BLD AUTO: 7.95 THOUSAND/UL (ref 4.31–10.16)
WBC #/AREA URNS AUTO: ABNORMAL /HPF

## 2021-02-20 PROCEDURE — 82150 ASSAY OF AMYLASE: CPT

## 2021-02-20 PROCEDURE — 83735 ASSAY OF MAGNESIUM: CPT

## 2021-02-20 PROCEDURE — 85610 PROTHROMBIN TIME: CPT

## 2021-02-20 PROCEDURE — 84439 ASSAY OF FREE THYROXINE: CPT

## 2021-02-20 PROCEDURE — 85025 COMPLETE CBC W/AUTO DIFF WBC: CPT

## 2021-02-20 PROCEDURE — 84480 ASSAY TRIIODOTHYRONINE (T3): CPT

## 2021-02-20 PROCEDURE — 85730 THROMBOPLASTIN TIME PARTIAL: CPT

## 2021-02-20 PROCEDURE — 82465 ASSAY BLD/SERUM CHOLESTEROL: CPT

## 2021-02-20 PROCEDURE — 81001 URINALYSIS AUTO W/SCOPE: CPT | Performed by: OBSTETRICS & GYNECOLOGY

## 2021-02-20 PROCEDURE — 84443 ASSAY THYROID STIM HORMONE: CPT

## 2021-02-20 PROCEDURE — 80053 COMPREHEN METABOLIC PANEL: CPT

## 2021-02-20 PROCEDURE — 84100 ASSAY OF PHOSPHORUS: CPT

## 2021-02-20 PROCEDURE — 83690 ASSAY OF LIPASE: CPT

## 2021-02-20 PROCEDURE — 82977 ASSAY OF GGT: CPT

## 2021-02-20 NOTE — PLAN OF CARE
Problem: Potential for Falls  Goal: Patient will remain free of falls  Description: INTERVENTIONS:  - Assess patient frequently for physical needs  -  Identify cognitive and physical deficits and behaviors that affect risk of falls    -  [place] fall precautions as indicated by assessment   - Educate patient/family on patient safety including physical limitations  - Instruct patient to call for assistance with activity based on assessment  - Modify environment to reduce risk of injury  - Consider OT/PT consult to assist with strengthening/mobility  Outcome: Progressing

## 2021-02-22 ENCOUNTER — HOSPITAL ENCOUNTER (OUTPATIENT)
Dept: INFUSION CENTER | Facility: CLINIC | Age: 66
Discharge: HOME | End: 2021-02-22
Payer: COMMERCIAL

## 2021-02-22 ENCOUNTER — NUTRITION (OUTPATIENT)
Dept: NUTRITION | Facility: CLINIC | Age: 66
End: 2021-02-22

## 2021-02-22 ENCOUNTER — DOCUMENTATION (OUTPATIENT)
Dept: OTHER | Facility: HOSPITAL | Age: 66
End: 2021-02-22

## 2021-02-22 VITALS
SYSTOLIC BLOOD PRESSURE: 132 MMHG | DIASTOLIC BLOOD PRESSURE: 84 MMHG | TEMPERATURE: 96.3 F | RESPIRATION RATE: 16 BRPM | HEIGHT: 59 IN | OXYGEN SATURATION: 100 % | WEIGHT: 190 LBS | HEART RATE: 75 BPM | BODY MASS INDEX: 38.3 KG/M2

## 2021-02-22 DIAGNOSIS — Z45.2 ENCOUNTER FOR CENTRAL LINE CARE: Primary | ICD-10-CM

## 2021-02-22 DIAGNOSIS — Z71.3 NUTRITIONAL COUNSELING: Primary | ICD-10-CM

## 2021-02-22 DIAGNOSIS — C54.1 ENDOMETRIAL CANCER (HCC): ICD-10-CM

## 2021-02-22 PROCEDURE — J9999Q0 INV ATEZOLIZUMAB 1200MG/20ML 20 ML: Performed by: OBSTETRICS & GYNECOLOGY

## 2021-02-22 PROCEDURE — 96413 CHEMO IV INFUSION 1 HR: CPT

## 2021-02-22 PROCEDURE — J9035Q0 INV BEVACIZUMAB 400MG/16ML 16 ML: Performed by: OBSTETRICS & GYNECOLOGY

## 2021-02-22 PROCEDURE — 96417 CHEMO IV INFUS EACH ADDL SEQ: CPT

## 2021-02-22 RX ORDER — ACETAMINOPHEN 325 MG/1
650 TABLET ORAL EVERY 4 HOURS PRN
Status: DISCONTINUED | OUTPATIENT
Start: 2021-02-22 | End: 2021-02-25 | Stop reason: HOSPADM

## 2021-02-22 RX ORDER — ACETAMINOPHEN 325 MG/1
650 TABLET ORAL EVERY 4 HOURS PRN
Status: CANCELLED
Start: 2021-02-22

## 2021-02-22 RX ADMIN — SODIUM CHLORIDE 20 ML/HR: 0.9 INJECTION, SOLUTION INTRAVENOUS at 08:21

## 2021-02-22 RX ADMIN — Medication 1358 MG: at 10:20

## 2021-02-22 RX ADMIN — Medication 1200 MG: at 09:37

## 2021-02-22 RX ADMIN — ACETAMINOPHEN 650 MG: 325 TABLET, FILM COATED ORAL at 10:10

## 2021-02-22 NOTE — PLAN OF CARE
Problem: Potential for Falls  Goal: Patient will remain free of falls  Description: INTERVENTIONS:  - Assess patient frequently for physical needs  -  Identify cognitive and physical deficits and behaviors that affect risk of falls  -  [place] fall precautions as indicated by assessment   - Educate patient/family on patient safety including physical limitations  - Instruct patient to call for assistance with activity based on assessment  - Modify environment to reduce risk of injury  - Consider OT/PT consult to assist with strengthening/mobility  Outcome: Progressing     Problem: Knowledge Deficit  Goal: Patient/family/caregiver demonstrates understanding of disease process, treatment plan, medications, and discharge instructions  Description: Complete learning assessment and assess knowledge base    Interventions:  - Provide teaching at level of understanding  - Provide teaching via preferred learning methods  Outcome: Progressing

## 2021-02-22 NOTE — TELEPHONE ENCOUNTER
Patient has a nephrostomy tube and she states she does not have a fever, not issues are noted per patient within or near the nephrostomy site, but she complained of pain at the end of her urinary stream  What do you suggest for her? <-------- Click here to INCLUDE CoVID-19 Discharge Instructions

## 2021-02-22 NOTE — PATIENT INSTRUCTIONS
Nutrition Rx & Recommendations:  · Small, frequent meals/snacks may be easier to tolerate than 3 large daily meals  Aim for 5-6 small meals per day (every 2-3 hours)  · Include protein at all meals/snacks  · Follow a Cancer Preventative Nutrition Pattern: colorful, plant-based, low-fat, avoid added sugars, limit alcohol, include high fiber foods, limit processed meats, limit red meat, choose lean protein sources, use low-fat cooking methods, balance calories with physical activity, avoid excessive weight gain throughout life  · Incorporate physical activity as able/allowed  · For weight loss: monitor your weight at home at least 2x/week, record your weight, start by adding 250-500 extra calories per day, eat 5-6 small scheduled meals every 2-3 hrs, choose foods that are high in protein and calories (see pages 49-53 in your Eating Hints book), drink liquids with calories for example: milkshakes/smoothies/juice/soup/whole milk/chocolate milk, cook with protein fortified milk (see recipe on page 36 in your Eating Hints book), consider ready-to-drink oral nutrition supplements such as Ensure Plus, Ensure Enlive, Boost Plus, or Boost Very High Calorie, avoid "diet" and "light" foods when possible, avoid drinking too much with meals, contact your dietitian with any continued weight loss over the course of 1 week  For more info see pages 35-37 in your Eating Hints book  · For constipation: drink plenty of fluids (>64 oz/day); drink hot liquids; eat high-fiber foods as tolerated (whole grains, beans, peas, nuts, seeds, fruits, vegetables, etc); increase physical activity as tolerated  Avoid increasing fiber intake too quickly, add fiber into your diet slowly; keep a record of your bowel movements (see pages 13-14 in you Eating Hints book)    · For dry mouth: sip water throughout the day; try very sweet (or tart, as tolerated) drinks; chew gum or suck on hard candy, popsicles, & ice chips; eat easy to swallow foods (such as pureed cooked foods or soups); moisten food with sauce/gravy/salad dressing; do not drink beer, wine, or any type of alcohol; keep lips moist with lip balm; avoid tobacco products & second-hand smoke; try Biotene as needed (see pages 17-18 in your Eating Hints book)  Follow proper oral care; Try baking soda/salt water rinse recipe (mix 3/4 tsp salt + 1 tsp baking soda + 1 qt water; rinse with pain water after using) in Eating Hints book (pg 18)  Brush your teeth before/after meals & before bed  · Fluid Goal: 46-55 oz per day  Aim for at least 55 oz per day while having constipation  · Work on meal planning ahead of time  · Lifetime vitamin/mineral supplementation recommended for Gastric Bypass Surgery:  · Multivitamin BID  · Iron 45 mg daily  · Vitamin D daily  · Calcium Citrate 500 mg TID (do not take with iron, needs to be 2 hrs apart from iron)  · B12 1000 mcg daily    · Resources for recipes: [employer], cancerdietitian  com, oncObvious org/support/nutrition-and-cancer, [employer], www oncologynutrition  org/erfc/recipes, www cookforyourlife  org, The Essential Cancer Treatment Nutrition Guide and Cookbook: Includes 892 Healthy and Delicious Recipes  Personalized Goals:  1  Consume 5-6 small meals per day, eat something every 2-3 hrs  2  Make sure you have a protein source at all eating occasions  Good options include: [place] yogurt, cheese, eggs, nuts, nut butter, chicken, fish, cheese sticks, shrimp, etc )  3  Increase fluids to at least 55 oz per day  4  Continue to work on increasing your fiber intake: vegetables, nuts, oatmeal made with milk, fresh fruit, whole grains      Follow Up Plan: 3/15/21 during infusion   Recommend Referral to Other Providers: none at this time

## 2021-02-22 NOTE — PROGRESS NOTES
Patient was seen at [address] for Cycle 4 Day 1 treatment visit  Patient's Cycle 3 Rucaparib pills and drug diary were collected, and patient's Cycle 4 Rucaparib pills and drug diary were delivered to patient  Patient AEs and Conmeds were reviewed  Patient reported no changes to her conmeds, but did report changes to AEs including one episode of vomitting on Wednesday, [id number] after eating late at night  She also reported back pain which she believes in attributed to broken pubic bone  Additionally, patient reported that she still has nausea, which she takes Zofran PRN for, complains of intermittent heartburn, and congestion/post nasal drip  Chemotherapy was tolerated without incident  Patient was made aware of future appointments and was told to call with any questions or concerns

## 2021-02-22 NOTE — PROGRESS NOTES
Labs have been reviewed and signed by Dr Rob Casas on 2/22/21  Per protocol, patient is ok to proceed with Cycle 4 Day 1 treatment on 2/22/21

## 2021-02-22 NOTE — PROGRESS NOTES
Pt to clinic for atezolizumab and bevacizumab from clinical trials, waiting for ok to treat after review of labs, pt offers no complaints at this time, will continue to monotor
Pt tolerated infusion without complications,a ware of next appointment, declined avs
Statement Selected

## 2021-02-27 DIAGNOSIS — I82.4Y9 ACUTE DEEP VEIN THROMBOSIS (DVT) OF PROXIMAL VEIN OF LOWER EXTREMITY, UNSPECIFIED LATERALITY (HCC): ICD-10-CM

## 2021-03-05 ENCOUNTER — HOSPITAL ENCOUNTER (OUTPATIENT)
Dept: RADIOLOGY | Facility: HOSPITAL | Age: 66
Discharge: HOME | End: 2021-03-05
Attending: RADIOLOGY
Payer: COMMERCIAL

## 2021-03-05 DIAGNOSIS — N13.5 OBSTRUCTION OF RIGHT URETER: Primary | ICD-10-CM

## 2021-03-05 DIAGNOSIS — E78.5 DYSLIPIDEMIA: ICD-10-CM

## 2021-03-05 PROCEDURE — 50387 CHANGE NEPHROURETERAL CATH: CPT | Performed by: RADIOLOGY

## 2021-03-05 PROCEDURE — 50387 CHANGE NEPHROURETERAL CATH: CPT

## 2021-03-05 PROCEDURE — C2625 STENT, NON-COR, TEM W/DEL SY: HCPCS

## 2021-03-05 PROCEDURE — C1769 GUIDE WIRE: HCPCS

## 2021-03-05 RX ORDER — LIDOCAINE WITH 8.4% SOD BICARB 0.9%(10ML)
SYRINGE (ML) INJECTION CODE/TRAUMA/SEDATION MEDICATION
Status: COMPLETED | OUTPATIENT
Start: 2021-03-05 | End: 2021-03-05

## 2021-03-05 RX ADMIN — IOHEXOL 15 ML: 350 INJECTION, SOLUTION INTRAVENOUS at 13:10

## 2021-03-05 RX ADMIN — Medication 10 ML: at 12:21

## 2021-03-05 NOTE — DISCHARGE INSTRUCTIONS
Nephrostomy Tube Care     WHAT YOU NEED TO KNOW:   A nephrostomy tube is a catheter (thin plastic tube) that is inserted through your skin and into your kidney  The nephrostomy tube drains urine from your kidney into a collecting bag outside your body  You may need a nephrostomy tube when something is blocking the normal flow of urine  A nephrostomy tube may be used for a short or a long period of time  The nephrostomy tube comes out of your back, so you will need someone to help care for your nephrostomy tube  DISCHARGE INSTRUCTIONS:      How to clean the skin around the nephrostomy tube and change the bandage:  Since the nephrostomy tube comes out of your back, you will not be able to care for it by yourself  Ask someone to follow the general directions below to check and care for your nephrostomy tube  Gather the items you will need  Disposable (single use) under-pad, and a clean washcloth  ¨ Plain soap, warm water, and new medical gloves  ¨ Sterile gauze bandages  ¨ Clear adhesive dressing or medical tape  ¨ Skin barrier  ¨ Protective skin film  ¨ Trash bag  · Remove the old bandage, and check the tube entry site  ¨ Have the patient lie on his side with the nephrostomy tube entry site facing up  Place the under-pad where it will catch drainage as you are working with the nephrostomy tube  ¨ Wash your hands with soap and water  Put on new medical gloves  ¨ Gently remove the old bandage, without pulling on the tube  Do this by holding the skin beside the tube with one hand  With the other hand, gently remove sticky tape and the skin barrier by pulling in the same direction as hair growth  Do not touch the side of the bandage that is placed over or around the tube  Throw the bandage and skin barrier away in a trash bag  ¨ Look for signs of infection, such as skin redness and swelling  Report any skin changes to healthcare providers  ¨ Clean the tube entry site      ¨ Hold the tube in place to keep it from being pulled out while you are cleaning around it  ¨ You will need to clean the area twice  For the first cleaning, wet a new gauze bandage with soap and water  Begin at the entry site of the tube  Wipe the skin in circles, moving away from the entry site  Remove blood and any other material with the gauze  Do this as often as needed  Use a new gauze bandage each time you clean the area, moving away from the entry site  ¨ For the second cleaning, wet a new gauze bandage with water  Begin at the entry site of the tube  Wipe the skin in circles, moving away from the entry site  Use a new gauze bandage each time you clean the area, moving away from the entry site  ¨ Gently pat the skin with a clean washcloth to dry it  · Apply the skin barrier and bandages  ¨ Roll up a bandage to make it thick, and place it under  the place where the tube enters the skin  Place it to support the tube, and stop it from kinking or bending  Tape the bandage in place, and apply more bandages if directed by a healthcare provider  ¨ Bring the tubing forward to the front and tape it to the skin  Do not stretch the tube tight, because this may pull the nephrostomy tube out  How often to change the bandage  Change the bandage around the tube, every other day  If your bandages  get dirty or wet, change them right away, and as often as needed  If your nephrostomy tube is to be used for a long period of time, the tube needs to be changed every 2 to 3 months  Healthcare providers will tell you when you need to make an appointment to have your tube changed  How to care for the urine drainage bag:   · Ask if you need to measure and write down how much urine is in the bag before you empty it  Drain urine out of the drainage bag when it is ½ to ? full  Open the spout at the bottom of the bag to empty the urine into the toilet  · You may need to detach the drainage bag from the nephrostomy tube to change it    If so, attach a new drainage bag tightly to the nephrostomy tube  ·   How to prevent problems with your nephrostomy tube:   · Change bandages, directed  This helps to prevent infection  Throw away or clean your drainage bag as directed by your healthcare provider  · Wipe the connecting ends of the drainage bag with alcohol before you reconnect the bag to the tube  This helps prevent infection  Keep the tube taped to your skin and connected to a drainage bag placed below the level of your kidneys  This helps prevent urine from backing up into your kidneys  You may wear a small drainage bag strapped to your leg to let you move around more easily  · Check the catheter to be sure it is in place after you change your clothes or do other activities  Do not wear tight clothing over the tube  Place the tubing over your thigh rather than under it when you are sitting down  Be sure that nothing is pulling on the nephrostomy tube when you move around  · Change positions if you see little or no urine in your drainage bag  Check to see if the urine tube is twisted or bent  Be sure that you are not sitting or lying on the tube  If there are no kinks and there is little or no urine in the drainage bag, tell your healthcare provider  · Flush out the tube as directed  Some tubes get flushed one time a day with 10 mls of NSS You will be given a prescription for the flushes  To flush the nephrostomy tube, clean both connections with alcohol swap  Twist off the drainage bag tube and twist the saline syringe into the nephrostomy tube and flush briskly  Remove the syringe and twist the drainage bag tube back into the nephrostomy tube  · Keep the site covered while you shower  Tape a piece of clear adhesive plastic over the dressing to keep it dry while you shower  Do not take tub baths      Contact Interventional Radiology at [phone] [facility] PATIENTS: Contact Interventional Radiology at [phone]) Doretha Franks PATIENTS: Contact Interventional Radiology at [phone]) if:  · The skin around the nephrostomy tube is red, swollen, itches, or has a rash  · You have a fever greater than 101 or chills  · You have lower back or hip pain  · There are changes in how your urine looks or smells  · You have little or no urine draining from the nephrostomy tube  · You have nausea and are vomiting  · The black bernardo on your tube has moved, or the tube is longer than when it was put in    · You have questions or concerns about your condition or care  · The nephrostomy tube comes out completely  · There is blood, pus, or a bad smell coming from the place where the tube enters your skin  · Urine is leaking around the tube  The following pharmacies carry the flush syringes  [facility]                     [facility]       [address]  Phone [phone]            Phone [phone]  [address] [facility]                      [address]                                 [phone]  Phone [phone]            Phone [phone]    [facility]                                                                         [facility] [phone]  [address]    [facility] [place]   Phone [phone]

## 2021-03-05 NOTE — PROGRESS NOTES
Outpatient Oncology Nutrition Consult  Type of Consult: Follow Up  Care Location: [facility]    Nutrition Assessment:  Oncology Diagnosis & Treatments:   Endometrial cancer  -S/p surgery 12/19/17    -Recurrence 7/8/19    -S/p chemotherapy (carboplatin/taxol from 7/30/19-1/6/20)  -S/p whole pelvis RT (2/4/20- 4/13/20)  -Disease progression    -ENDOBAR trial started 12/21/20  Oncology History   Endometrial cancer ([facility] [address] )   11/17/2017 Initial Diagnosis    Endometrial cancer ([facility] [address] )     11/17/2017 Biopsy    ENDOMETRIAL BIOPSY: WELL DIFFERENTIATED ENDOMETRIAL ADENOCARCINOMA (FIGO I) WITH FOCALMUCINOUS FEATURES  Part B: ENDOCERVICAL POLYP:BENIGN ENDOCERVICAL      12/19/2017 Surgery    Robotic assisted total laparoscopic hysterectomy with bilateral salpingo-oophorectomy and sentinel bilateral pelvic lymph node dissection  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 (4 4 x 3 2 cm tumor, 9 4/15 4mm invasion, NO LVSI, washings revealed atypical cellular changes)     12/19/2017 Genetic Testing    Morrison testing negative     6/28/2019 Biopsy    A  Breast, Right, US BX Right Breast 1000 4 cmfn:  - Benign breast tissue with focal histiocytic aggregate  See comment   - Negative for atypia and in-situ or invasive carcinoma  7/8/2019 Recurrence    Presented with right lower extremity DVT and CT demonstrating right pelvic sidewall mass with venous, ureteral and nerve compression causing significant neuropathic pain  Biopsy:  Lymph Node, Right pelvic lymph node x3:  - High-grade adenocarcinoma  7/30/2019 - 1/6/2020 Chemotherapy    Taxol 175 mg/m2 and carboplatin AUC 6 every 21 days  Dose was reduced to taxol 135 mg/m2 and carboplatin AUC 5  Completed 6 cycles  Treatment protracted due to multiple hospital admissions       2/24/2020 - 4/13/2020 Radiation    adjuvant external beam radiation therapy to the whole pelvis to 4500 cGy followed by boost to gross disease of an additional 2200 cGy 11/23/2020 Progression    New necrotic adenopathy in the retroperitoneum on CT      12/21/2020 -  Chemotherapy    Began chemotherapy with rucaparib, atezolizumab, and bevacizumab as per [facility] clinical trial        PMH:  RNYGB (2001 at [address])    Past Medical History:   Diagnosis Date    Anemia     Chemotherapy follow-up examination     Depression     Endometrial cancer ([address] ) 12/2017    Hyperglycemia     vx type 2 dm -- last assessed 4/1/14; resolved 11/7/17    Hyperlipidemia     Hypertension     Obesity     last assessed 10/14/17; resolved 11/7/17     Past Surgical History:   Procedure Laterality Date    ABDOMINAL SURGERY      GASTRIC BYPASS    CHOLECYSTECTOMY      at the time of gastric bypass    COLONOSCOPY      CT NEEDLE BIOPSY LYMPH NODE  7/8/2019    FL GUIDED CENTRAL VENOUS ACCESS DEVICE INSERTION  11/12/2019    GASTRIC BYPASS      HYSTERECTOMY Bilateral     total abdominal with salpingo-oophorectomy    IR NEPHROSTOMY TUBE PLACEMENT  7/26/2019    IR PICC LINE  9/27/2019    IR PORT PLACEMENT  7/26/2019    IR PORT REMOVAL  9/20/2019    OOPHORECTOMY Bilateral     LA INSJ TUNNELED CTR VAD W/SUBQ PORT AGE 5 YR/> Left 11/12/2019    Procedure: INSERTION VENOUS PORT ( PORT-A-CATH) IR;  Surgeon: Octaviano Celis DO;  [facility];  Service: Interventional Radiology    LA LAP, RADICAL HYST W/ TUBE&OV, NODE BX N/A 12/19/2017    Procedure: HYSTERECTOMY LAPAROSCOPIC TOTAL ([address] [place] [address]) W/ ROBOTICS; BILATERAL SALPINGOOPHERECTOMY; LYMPH NODE DISSECTION; lysis of adhesions;  Surgeon: Andria Cameron MD;  Location: [facility];  Service: Gynecology Oncology    LA LAP,DIAGNOSTIC ABDOMEN N/A 12/19/2017    Procedure: LAPAROSCOPY DIAGNOSTIC;  Surgeon: Andria Cameron MD;  Location: [facility];  Service: Gynecology Oncology    TONSILLECTOMY      US GUIDED BREAST BIOPSY RIGHT COMPLETE Right 6/28/2019       Review of Medications:   Vitamins, Supplements and Herbals: yes: Hx RNYGB regimen: Vitamin D, Folic Acid, Align, calcium citrate TID, B12 1000 mcg QD, MVI BID (MVI does not have iron);  Iron 65 mg QD (2 hrs seperate from calcium)    Current Outpatient Medications:     acetaminophen (TYLENOL) 325 mg tablet, Take 2 tablets (650 mg total) by mouth every 6 (six) hours as needed for mild pain, headaches or fever, Disp: 30 tablet, Rfl: 0    ARIPiprazole (ABILIFY) 10 mg tablet, Take 10 mg by mouth daily, Disp: , Rfl:     aspirin (ECOTRIN LOW STRENGTH) 81 mg EC tablet, Take 81 mg by mouth daily, Disp: , Rfl:     Calcium-Magnesium-Vitamin D (CALCIUM 500 PO), Take by mouth daily , Disp: , Rfl:     cholecalciferol (VITAMIN D3) 1,000 units tablet, Take 1,000 Units by mouth daily, Disp: , Rfl:     Cyanocobalamin (VITAMIN B12 PO), Take by mouth daily , Disp: , Rfl:     enoxaparin (LOVENOX) 120 mg/0 8 mL, INJECT 0 8 ML (120 MG TOTAL) UNDER THE SKIN DAILY, Disp: 24 Syringe, Rfl: 3    folic acid (FOLVITE) 1 mg tablet, Take 1 tablet (1 mg total) by mouth daily, Disp: , Rfl: 0    gemfibrozil (LOPID) 600 mg tablet, TAKE 1 TABLET BY MOUTH EVERY DAY, Disp: 90 tablet, Rfl: 1    LORazepam (ATIVAN) 0 5 mg tablet, Take 1 tablet (0 5 mg total) by mouth every 6 (six) hours as needed for anxiety (or nausea), Disp: 36 tablet, Rfl: 1    Multiple Vitamin (MULTIVITAMIN) tablet, Take 1 tablet by mouth daily, Disp: , Rfl:     Myrbetriq 50 MG TB24, TAKE 1 TABLET BY MOUTH EVERY DAY, Disp: 30 tablet, Rfl: 5    nitrofurantoin (MACROBID) 100 mg capsule, Take 1 capsule (100 mg total) by mouth 2 (two) times a day, Disp: 14 capsule, Rfl: 0    omeprazole (PriLOSEC) 20 mg delayed release capsule, Take 20 mg by mouth daily, Disp: , Rfl:     ondansetron (ZOFRAN) 4 mg tablet, Take 1 tablet (4 mg total) by mouth every 8 (eight) hours as needed for nausea or vomiting, Disp: 30 tablet, Rfl: 0    oxybutynin (DITROPAN XL) 15 MG 24 hr tablet, Take 1 tablet (15 mg total) by mouth daily at bedtime, Disp: 90 tablet, Rfl: 3    quinapril (ACCUPRIL) 5 mg tablet, TAKE 1 TABLET BY MOUTH EVERYDAY AT BEDTIME, Disp: 90 tablet, Rfl: 1    rucaparib (RUBRACA) 300 mg tablet, Take 600 mg by mouth every 12 (twelve) hours Take with or without food  Do not repeat a vomited dose , Disp: , Rfl:     saccharomyces boulardii (FLORASTOR) 250 mg capsule, Take 1 capsule (250 mg total) by mouth 2 (two) times a day, Disp: , Rfl: 0    Sodium Chloride Flush (Normal Saline Flush) 0 9 % SOLN, , Disp: , Rfl:     sodium chloride, PF, 0 9 %, 10 mL by Intracatheter route daily for 30 doses 10cc syringes, Disp: 300 mL, Rfl: 3    venlafaxine (EFFEXOR) 75 mg tablet, Take 75 mg by mouth 3 (three) times a day  , Disp: , Rfl:   No current facility-administered medications for this visit       Facility-Administered Medications Ordered in Other Visits:     INV atezolizumab (INVESTIGATIONAL) 1,200 mg in sodium chloride 0 9 % 250 mL infusion, 1,200 mg, Intravenous, Once, Robert Bello MD    INV bevacizumab (INVESTIGATIONAL) 1,358 mg in sodium chloride 0 9 % 45 68 mL IVPB, 1,358 mg, Intravenous, Once, Robert Bello MD    sodium chloride 0 9 % infusion, 20 mL/hr, Intravenous, Continuous, Robert Bello MD    Most Recent Lab Results:  Lab Results   Component Value Date    WBC 6 75 03/12/2021    IRON 24 (L) 10/02/2019    TIBC 138 (L) 10/02/2019    FERRITIN 1,042 (H) 10/02/2019    CHOLESTEROL 183 03/12/2021    CHOL 183 10/27/2017    TRIG 88 11/01/2018    HDL 59 11/01/2018    LDLCALC 116 (H) 11/01/2018    ALT 42 03/12/2021    AST 30 03/12/2021    ALB 3 7 03/12/2021     10/27/2017     05/12/2017    SODIUM 137 03/12/2021    SODIUM 134 (L) 02/20/2021    K 4 4 03/12/2021    K 4 1 02/20/2021     03/12/2021    BUN 16 03/12/2021    BUN 24 02/20/2021    CREATININE 1 10 03/12/2021    CREATININE 1 09 02/20/2021    EGFR 54 03/12/2021    PHOS 3 5 03/12/2021    PHOS 4 2 (H) 02/20/2021    GLUCOSE 219 (H) 12/19/2017    POCGLU 97 08/18/2020    GLUF 108 (H) 03/12/2021    GLUF 99 12/18/2020    GLUC 108 03/12/2021    HGBA1C 5 1 02/03/2020    HGBA1C 6 2 08/28/2019    HGBA1C 5 5 11/01/2018    CALCIUM 9 9 03/12/2021    FOLATE 5 4 10/06/2019    MG 2 0 03/12/2021     Anthropometric Measurements:   Height: 59"  Ht Readings from Last 1 Encounters:   03/12/21 4' 11" (1 499 m)     Wt Readings from Last 20 Encounters:   03/15/21 84 8 kg (187 lb)   03/12/21 85 3 kg (188 lb)   02/22/21 86 2 kg (190 lb)   02/03/21 88 5 kg (195 lb)   01/29/21 88 5 kg (195 lb)   01/11/21 88 5 kg (195 lb)   01/08/21 89 4 kg (197 lb)   12/21/20 90 3 kg (199 lb)   12/18/20 90 5 kg (199 lb 8 oz)   12/04/20 88 9 kg (196 lb)   11/25/20 89 1 kg (196 lb 8 oz)   11/04/20 87 1 kg (192 lb)   10/14/20 86 4 kg (190 lb 6 4 oz)   08/13/20 85 7 kg (189 lb)   08/06/20 86 9 kg (191 lb 9 6 oz)   08/05/20 88 1 kg (194 lb 3 2 oz)   07/30/20 88 5 kg (195 lb)   06/30/20 88 8 kg (195 lb 12 8 oz)   06/08/20 88 5 kg (195 lb)   05/06/20 83 9 kg (185 lb)     Weight Hx:  · Usual Weight: 270# before RNYGB in 2001; lowest post-op wt: 200#; wt typically fluctuates between 215-230#  · Varian: (2/25/20) 185 6#, (3/3/20) 188 6#, (3/10/20) 187 2#, (3/19/20) 186 2#, (3/24/20) 187#, (3/31/20) 185 4#, (4/10/20) 184  4#   · Home Scale Wts: (2/19/20) 185#, (8/3/20) 194#    Oncology Nutrition-Anthropometrics      Nutrition from 3/15/2021 in [facility] Oncology Diet[facility] Services Nutrition from 2/22/2021 in [facility] Oncology Dietitian Services   Patient age (years):  63 years  63 years   Patient (female) height (in):  61 in  61 in   Current Weight to be used for anthropometric calculations (lbs)  187 lbs  190 lbs   Current Weight to be used for anthropometric calculations (kg)  85 kg  86 kg   BMI:  37 8  --   IBW female:  95 lbs  95 lbs   IBW (kg) female:  43 2 kg  43 2 kg   IBW % (female)  196 8 %  200 %   Adjusted BW (female):  118 lbs  119 lbs   Adjusted BW kg (female):  53 6 kg  54 1 kg   % weight change after 1 month:  -1 6 %  --   Weight change after 1 month (lbs)  -3 lbs  -5 lbs   % weight change after 3 months:  -6 3 %  --   Weight change after 3 months (lbs)  -12 5 lbs  -7 lbs         Nutrition-Focused Physical Findings: none observed     Food/Nutrition-Related History & Client/Social History:    Current Nutrition Impact Symptoms:  [] Nausea - improved since last visit [x] Reduced Appetite - forcing self to eat [] Acid Reflux   [] Vomiting  [x] Unintended Wt Loss - d/t loss of appetite [] Malabsorption    [] Diarrhea  [] Unintended Wt Gain  [] Dumping Syndrome   [x] Constipation - +BM's 1x/day, using Miralax [] Thick Mucous/Secretions  [] Abdominal Pain    [] Dysgeusia (Altered Taste) [x] Xerostomia (Dry Mouth) - using Biotene toothpaste and mouthwash  Uses dry mouth lozenges prn  Biotene has been effective  [] Gas    [] Dysosmia (Altered Smell)  [] Elner Muscat  [] Difficulty Chewing    [] Oral Mucositis (Sore Mouth)  [x] Fatigue  [] Other:    [] Odynophagia   [] Esophagitis  [] Other:    [] Dysphagia [] Early Satiety  [] No Problems Eating      Food Allergies & Intolerances: yes: had skin patch testing which showed allergy to strawberries, but says she is able to eat strawberries without any side effects    Current Diet: Regular Diet, No Restrictions and Small Frequent Meals   Current Nutrition Intake: Unchanged from last visit - maybe slightly better than last visit  Appetite: Poor - has been forcing self to eat  Nutrition Route: PO  Meal planning/preparation mainly done by: mother does cooking; sister does shopping  Activity level: Mainly sedentary  Plans to start walking outside  24 Hr Diet Recall: tends to eat more in the evenings  Staples include: soup, raisin bread with peanut butter, and cheese    Snack: (4-5am) raisin bread with peanut butter  Breakfast: 1 cheese stick; yesterday: 1/2 de la garza/egg/cheese McGriddle, 32 oz iced tea  Lunch: /12 cup shredded cheese and 8 crackers  Snack: grapes  Dinner: 2 Yaccos hot dogs with mustard and onions  Snack: pretzels with honey mustard dressing, 1 apple, 1 cheese stick    Beverages: water (16 9 oz x3), Butch' regular unsweetened tea (32 oz x1)   -Does not drink coffee or alcohol      -Does not separate fluids from solids, does not have discomfort while eating and drinking at the same time  Supplements:    None       Oncology Nutrition-Estimated Needs      Nutrition from 2020 in [facility] Oncology Dietitian Services   Weight type used  Adjusted weight   Weight in kilograms (kg) used for estimated needs  54 1 kg   Energy needs formula:   25-30 kcal/kg   Energy needs based on 25 kcal/k kcal   Energy needs based on 30 kcal/k kcal   Protein needs formula:  1-1 2 g/kg   Protein needs based on 1 g/kg  54 g   Protein needs based on 1 2 g/k g   Fluid needs formula:  25-30 mL/kg   Fluid needs based on 25 mL/kg  1350 mL   Fluid needs in ounces  46 oz   Fluid needs based on 30 mL/kg  1620 mL   Fluid needs in ounces  55 oz        Discussion & Intervention:    Chandrika Mojica was evaluated today for an RD follow up regarding wt loss and pt request for dietitian involvement throughout tx  Chandrika Mojica is currently undergoing tx for endometrial cancer  She hast lost another ~3# over the past month which she attributes to an ongoing poor appetite  She thinks she is eating about the same to slightly better than last visit  She is trying to eat small, frequent meals but finds that she can eat more in the evenings rather than the mornings  Her nausea and abdominal pain have resolved  She is moving her bowels daily  She has CIB shakes at home and is considering adding them in as a morning snack  She plans to start walking outside soon  Reviewed 24 hour recall, which revealed an inadequate po intake, and discussed ways to increase kcal, protein, and fluid intakes and optimize nutrient intake    Also reviewed the importance of wt management throughout the tx process and the role of a high kcal/ high protein diet and a cancer preventative diet in managing wt and overall health  Based on today's assessment, discussion included: MNT for: reduced appetite, wt loss, constipation, a high kcal/protein diet & food choices to include at all meals & snacks (Examples of high kcal foods: cheese, full-fat dairy products, nut butter, plant-based fats, coconut oil/milk, avocado, butter, cream soup, etc  Examples of high protein foods: eggs, chicken, fish, beans/legumes, nuts/nut butters, bone broth, etc ) , fortifying foods for added kcal and protein (examples include: adding cheese to foods such as eggs, mashed potatoes, casseroles, etc ; Making oatmeal with whole milk rather than water; Making fortified mashed potatoes with cream, butter, dry milk powder, plain [place] yogurt, and cheese ), adequate hydration & fluid choices, eating smaller more frequent meals every 2-3 hours (5-6 small meals/day), having consistent and planned snacks between meals and starting oral nutrition supplements  Moving forward, Minesh Garcia plans to and was encouraged to eat 5-6 small meals per day (every 2-3 hrs) and make sure there is a protein source at all eating occasions (cheese sticks, [place] yogurt, eggs, nut butter, nuts, shrimp)  She will focus on making sure she has snacks between all meals  She will also consider trying to incorporate her CIB shakes as morning snacks  She will try setting an alarm on her phone as a reminder to eat  We will follow up in ~6 weeks  Materials Provided: not applicable  All questions and concerns addressed during todays visit  Minesh Garcia has RD contact information  Nutrition Diagnosis:  Inadequate Energy Intake related to physiological causes, disease state and treatment related issues as evidenced by food recall, wt loss and discussion with pt and/or family    Increased Nutrient Needs (kcal & pro) related to increased demand for nutrients and disease state as evidenced by cancer dx and pt undergoing tx for cancer  Altered GI Function related to alteration in GI tract motility as evidenced by Constipation  Behavorial/Environmental- Knowledge & Beliefs: Limited adherence to nutrition-related recommendations related to behavioral causes as evidenced by Per pt interview, has not acheived nutrition goals set over multiple sessions  Monitoring & Evaluation:   Goals:  · weight maintenance/stabilization  · adequate nutrition impact symptom management  · pt to meet >/=75% estimated nutrition needs daily   · Continue lifelong vitamin/mineral supplementation for RNY Gastric Bypass   · Eat 5-6 small meals per day    · Progress Towards Goals: Progressing    Nutrition Rx & Recommendations:  · Small, frequent meals/snacks may be easier to tolerate than 3 large daily meals  Aim for 5-6 small meals per day (every 2-3 hours)  · Include protein at all meals/snacks  · Follow a Cancer Preventative Nutrition Pattern: colorful, plant-based, low-fat, avoid added sugars, limit alcohol, include high fiber foods, limit processed meats, limit red meat, choose lean protein sources, use low-fat cooking methods, balance calories with physical activity, avoid excessive weight gain throughout life  · Incorporate physical activity as able/allowed  · For appetite loss: try powdered or liquid nutrition supplements; eating by the clock every 2-3 hours; set a timer to remind yourself to eat, keep snacks nearby; add extra protein & calories to your diet; drink liquids in between meals, choose liquids that add calories; eat a bedtime snack; eat soft, cool or frozen foods; eat a larger meal when you feel well & rested; only sip small amounts of liquids during meals (see pages 10-12 in your Eating Hints book)    · For weight loss: monitor your weight at home at least 2x/week, record your weight, start by adding 250-500 extra calories per day, eat 5-6 small scheduled meals every 2-3 hrs, choose foods that are high in protein and calories (see pages 49-53 in your Eating Hints book), drink liquids with calories for example: milkshakes/smoothies/juice/soup/whole milk/chocolate milk, cook with protein fortified milk (see recipe on page 36 in your Eating Hints book), consider ready-to-drink oral nutrition supplements such as Ensure Plus, Ensure Enlive, Boost Plus, or Boost Very High Calorie, avoid "diet" and "light" foods when possible, avoid drinking too much with meals, contact your dietitian with any continued weight loss over the course of 1 week  For more info see pages 35-37 in your Eating Hints book  · For constipation: drink plenty of fluids (>64 oz/day); drink hot liquids; eat high-fiber foods as tolerated (whole grains, beans, peas, nuts, seeds, fruits, vegetables, etc); increase physical activity as tolerated  Avoid increasing fiber intake too quickly, add fiber into your diet slowly; keep a record of your bowel movements (see pages 13-14 in you Eating Hints book)  · Fluid Goal: 46-55 oz per day  Aim for at least 55 oz per day while having constipation  · Consider setting an alarm on your phone every 3 hrs as a reminder to eat  · Try adding your [place] Instant Breakfast shake in the morning as a snack  · Lifetime vitamin/mineral supplementation recommended for Gastric Bypass Surgery:  · Multivitamin BID  · Iron 45 mg daily  · Vitamin D daily  · Calcium Citrate 500 mg TID (do not take with iron, needs to be 2 hrs apart from iron)  · B12 1000 mcg daily    · Resources for recipes: [employer], cancerdietitian  com, oncolink org/support/nutrition-and-cancer, SkinRolePoint, www oncologynutrition  org/erfc/recipes, www cookforEarnix  org, The Essential Cancer Treatment Nutrition Guide and Cookbook: Includes 522 Healthy and Delicious Recipes      Follow Up Plan: 4/26/21 during infusion   Recommend Referral to Other Providers: none at this time

## 2021-03-08 DIAGNOSIS — C54.1 ENDOMETRIAL CANCER (HCC): Primary | ICD-10-CM

## 2021-03-12 ENCOUNTER — DOCUMENTATION (OUTPATIENT)
Dept: OTHER | Facility: HOSPITAL | Age: 66
End: 2021-03-12

## 2021-03-12 ENCOUNTER — OFFICE VISIT (OUTPATIENT)
Dept: GYNECOLOGIC ONCOLOGY | Facility: CLINIC | Age: 66
End: 2021-03-12
Payer: COMMERCIAL

## 2021-03-12 ENCOUNTER — HOSPITAL ENCOUNTER (OUTPATIENT)
Dept: INFUSION CENTER | Facility: HOSPITAL | Age: 66
Discharge: HOME | End: 2021-03-12
Payer: COMMERCIAL

## 2021-03-12 VITALS — TEMPERATURE: 97.6 F

## 2021-03-12 VITALS
TEMPERATURE: 98.3 F | WEIGHT: 188 LBS | RESPIRATION RATE: 18 BRPM | SYSTOLIC BLOOD PRESSURE: 146 MMHG | BODY MASS INDEX: 37.9 KG/M2 | HEIGHT: 59 IN | DIASTOLIC BLOOD PRESSURE: 72 MMHG | HEART RATE: 77 BPM

## 2021-03-12 DIAGNOSIS — C54.1 ENDOMETRIAL CANCER (HCC): Primary | Chronic | ICD-10-CM

## 2021-03-12 DIAGNOSIS — K59.03 DRUG INDUCED CONSTIPATION: ICD-10-CM

## 2021-03-12 DIAGNOSIS — I82.4Y1 ACUTE DEEP VEIN THROMBOSIS (DVT) OF PROXIMAL VEIN OF RIGHT LOWER EXTREMITY (HCC): ICD-10-CM

## 2021-03-12 DIAGNOSIS — R31.9 URINARY TRACT INFECTION WITH HEMATURIA, SITE UNSPECIFIED: ICD-10-CM

## 2021-03-12 DIAGNOSIS — Z45.2 ENCOUNTER FOR CENTRAL LINE CARE: ICD-10-CM

## 2021-03-12 DIAGNOSIS — R11.0 CHEMOTHERAPY-INDUCED NAUSEA: ICD-10-CM

## 2021-03-12 DIAGNOSIS — N39.0 URINARY TRACT INFECTION WITH HEMATURIA, SITE UNSPECIFIED: ICD-10-CM

## 2021-03-12 DIAGNOSIS — T45.1X5A CHEMOTHERAPY-INDUCED NAUSEA: ICD-10-CM

## 2021-03-12 DIAGNOSIS — C54.1 ENDOMETRIAL CANCER (HCC): Primary | ICD-10-CM

## 2021-03-12 DIAGNOSIS — Z93.6 NEPHROSTOMY STATUS (HCC): ICD-10-CM

## 2021-03-12 DIAGNOSIS — Z78.9 NEED FOR FOLLOW-UP BY SOCIAL WORKER: ICD-10-CM

## 2021-03-12 LAB
ALBUMIN SERPL BCP-MCNC: 3.7 G/DL (ref 3.5–5)
ALP SERPL-CCNC: 159 U/L (ref 46–116)
ALT SERPL W P-5'-P-CCNC: 42 U/L (ref 12–78)
AMYLASE SERPL-CCNC: 37 IU/L (ref 25–115)
ANION GAP SERPL CALCULATED.3IONS-SCNC: 9 MMOL/L (ref 4–13)
APTT PPP: 54 SECONDS (ref 23–37)
AST SERPL W P-5'-P-CCNC: 30 U/L (ref 5–45)
BACTERIA UR QL AUTO: ABNORMAL /HPF
BASOPHILS # BLD AUTO: 0.04 THOUSANDS/ΜL (ref 0–0.1)
BASOPHILS NFR BLD AUTO: 1 % (ref 0–1)
BILIRUB SERPL-MCNC: 0.35 MG/DL (ref 0.2–1)
BILIRUB UR QL STRIP: ABNORMAL
BUN SERPL-MCNC: 16 MG/DL (ref 5–25)
CALCIUM SERPL-MCNC: 9.9 MG/DL (ref 8.3–10.1)
CHLORIDE SERPL-SCNC: 104 MMOL/L (ref 100–108)
CHOLEST SERPL-MCNC: 183 MG/DL (ref 50–200)
CLARITY UR: ABNORMAL
CO2 SERPL-SCNC: 24 MMOL/L (ref 21–32)
COLOR UR: ABNORMAL
CREAT SERPL-MCNC: 1.1 MG/DL (ref 0.6–1.3)
CREAT UR-MCNC: 231 MG/DL
EOSINOPHIL # BLD AUTO: 0.03 THOUSAND/ΜL (ref 0–0.61)
EOSINOPHIL NFR BLD AUTO: 0 % (ref 0–6)
ERYTHROCYTE [DISTWIDTH] IN BLOOD BY AUTOMATED COUNT: 14.8 % (ref 11.6–15.1)
GFR SERPL CREATININE-BSD FRML MDRD: 54 ML/MIN/1.73SQ M
GGT SERPL-CCNC: 11 U/L (ref 5–85)
GLUCOSE P FAST SERPL-MCNC: 108 MG/DL (ref 65–99)
GLUCOSE SERPL-MCNC: 108 MG/DL (ref 65–140)
GLUCOSE UR STRIP-MCNC: NEGATIVE MG/DL
HCT VFR BLD AUTO: 32.8 % (ref 34.8–46.1)
HGB BLD-MCNC: 10.8 G/DL (ref 11.5–15.4)
HGB UR QL STRIP.AUTO: ABNORMAL
IMM GRANULOCYTES # BLD AUTO: 0.1 THOUSAND/UL (ref 0–0.2)
IMM GRANULOCYTES NFR BLD AUTO: 2 % (ref 0–2)
INR PPP: 1.07 (ref 0.84–1.19)
KETONES UR STRIP-MCNC: ABNORMAL MG/DL
LEUKOCYTE ESTERASE UR QL STRIP: ABNORMAL
LIPASE SERPL-CCNC: 146 U/L (ref 73–393)
LYMPHOCYTES # BLD AUTO: 0.77 THOUSANDS/ΜL (ref 0.6–4.47)
LYMPHOCYTES NFR BLD AUTO: 11 % (ref 14–44)
MAGNESIUM SERPL-MCNC: 2 MG/DL (ref 1.6–2.6)
MCH RBC QN AUTO: 31.2 PG (ref 26.8–34.3)
MCHC RBC AUTO-ENTMCNC: 32.9 G/DL (ref 31.4–37.4)
MCV RBC AUTO: 95 FL (ref 82–98)
MONOCYTES # BLD AUTO: 0.77 THOUSAND/ΜL (ref 0.17–1.22)
MONOCYTES NFR BLD AUTO: 11 % (ref 4–12)
NEUTROPHILS # BLD AUTO: 5.04 THOUSANDS/ΜL (ref 1.85–7.62)
NEUTS SEG NFR BLD AUTO: 75 % (ref 43–75)
NITRITE UR QL STRIP: NEGATIVE
NON-SQ EPI CELLS URNS QL MICRO: ABNORMAL /HPF
NRBC BLD AUTO-RTO: 0 /100 WBCS
PH UR STRIP.AUTO: 6.5 [PH]
PHOSPHATE SERPL-MCNC: 3.5 MG/DL (ref 2.3–4.1)
PLATELET # BLD AUTO: 331 THOUSANDS/UL (ref 149–390)
PMV BLD AUTO: 9.5 FL (ref 8.9–12.7)
POTASSIUM SERPL-SCNC: 4.4 MMOL/L (ref 3.5–5.3)
PROT SERPL-MCNC: 8.4 G/DL (ref 6.4–8.2)
PROT UR STRIP-MCNC: ABNORMAL MG/DL
PROT UR-MCNC: 459 MG/DL
PROT/CREAT UR: 1.99 MG/G{CREAT} (ref 0–0.1)
PROTHROMBIN TIME: 13.9 SECONDS (ref 11.6–14.5)
RBC # BLD AUTO: 3.46 MILLION/UL (ref 3.81–5.12)
RBC #/AREA URNS AUTO: ABNORMAL /HPF
SODIUM SERPL-SCNC: 137 MMOL/L (ref 136–145)
SP GR UR STRIP.AUTO: 1.02 (ref 1–1.03)
T3 SERPL-MCNC: 1.4 NG/ML (ref 0.6–1.8)
T4 FREE SERPL-MCNC: 1.14 NG/DL (ref 0.76–1.46)
TSH SERPL DL<=0.05 MIU/L-ACNC: 0.99 UIU/ML (ref 0.36–3.74)
UROBILINOGEN UR QL STRIP.AUTO: 1 E.U./DL
WBC # BLD AUTO: 6.75 THOUSAND/UL (ref 4.31–10.16)
WBC #/AREA URNS AUTO: ABNORMAL /HPF

## 2021-03-12 PROCEDURE — 82150 ASSAY OF AMYLASE: CPT

## 2021-03-12 PROCEDURE — 84100 ASSAY OF PHOSPHORUS: CPT

## 2021-03-12 PROCEDURE — 82570 ASSAY OF URINE CREATININE: CPT | Performed by: OBSTETRICS & GYNECOLOGY

## 2021-03-12 PROCEDURE — 85730 THROMBOPLASTIN TIME PARTIAL: CPT

## 2021-03-12 PROCEDURE — 83690 ASSAY OF LIPASE: CPT

## 2021-03-12 PROCEDURE — 82977 ASSAY OF GGT: CPT

## 2021-03-12 PROCEDURE — 81001 URINALYSIS AUTO W/SCOPE: CPT | Performed by: OBSTETRICS & GYNECOLOGY

## 2021-03-12 PROCEDURE — 3008F BODY MASS INDEX DOCD: CPT | Performed by: NURSE PRACTITIONER

## 2021-03-12 PROCEDURE — 84443 ASSAY THYROID STIM HORMONE: CPT

## 2021-03-12 PROCEDURE — 84480 ASSAY TRIIODOTHYRONINE (T3): CPT

## 2021-03-12 PROCEDURE — 85025 COMPLETE CBC W/AUTO DIFF WBC: CPT

## 2021-03-12 PROCEDURE — 85610 PROTHROMBIN TIME: CPT

## 2021-03-12 PROCEDURE — 83735 ASSAY OF MAGNESIUM: CPT

## 2021-03-12 PROCEDURE — 84156 ASSAY OF PROTEIN URINE: CPT | Performed by: OBSTETRICS & GYNECOLOGY

## 2021-03-12 PROCEDURE — 80053 COMPREHEN METABOLIC PANEL: CPT

## 2021-03-12 PROCEDURE — 84439 ASSAY OF FREE THYROXINE: CPT

## 2021-03-12 PROCEDURE — 1036F TOBACCO NON-USER: CPT | Performed by: NURSE PRACTITIONER

## 2021-03-12 PROCEDURE — 82465 ASSAY BLD/SERUM CHOLESTEROL: CPT

## 2021-03-12 PROCEDURE — 99214 OFFICE O/P EST MOD 30 MIN: CPT | Performed by: NURSE PRACTITIONER

## 2021-03-12 RX ORDER — NITROFURANTOIN 25; 75 MG/1; MG/1
100 CAPSULE ORAL 2 TIMES DAILY
Qty: 14 CAPSULE | Refills: 0 | Status: SHIPPED | OUTPATIENT
Start: 2021-03-12 | End: 2021-06-09 | Stop reason: SDUPTHER

## 2021-03-12 RX ORDER — SODIUM CHLORIDE 9 MG/ML
20 INJECTION, SOLUTION INTRAVENOUS CONTINUOUS
Status: DISCONTINUED | OUTPATIENT
Start: 2021-03-15 | End: 2021-03-18 | Stop reason: HOSPADM

## 2021-03-12 NOTE — PROGRESS NOTES
Assessment/Plan:    Problem List Items Addressed This Visit        Digestive    Drug induced constipation     Continue daily Miralax, mineral oil 30cc TID PRN  Cardiovascular and Mediastinum    Acute deep vein thrombosis (DVT) of proximal vein of lower extremity (HCC)     Continue Lovenox  Will need treatment for life given active malignancy  Genitourinary    Endometrial cancer ([facility] ) - Primary (Chronic)     63year-old with recurrent stage IB endometrial cancer who is s/p cycle 5 of treatment on the [facility] trial  She is tolerating treatment well  Has low appetite  She constipated, but is managing this with Miralax and mineral oil  Her performance status is 1  Patient will proceed with treatment on Monday  Labs pending  She will return to the office as per clinical trial scheduling  Urinary tract infection with hematuria     Urine dark, turbid, large leuks, mod blood  Will send Macrobid while awaiting culture  Relevant Medications    nitrofurantoin (MACROBID) 100 mg capsule       Other    Chemotherapy-induced nausea     Continue Zofran/Ativan  Nephrostomy status ([facility] )     Right PCNU in place  Stent exchange performed 3/5/21  Need for follow-up by             CHIEF COMPLAINT: Pre-chemo evaluation      Problem:  Cancer Staging  Endometrial cancer [facility])  Staging form: Corpus Uteri - Carcinoma, AJCC 7th Edition  - Clinical stage from 12/19/2017: FIGO Stage IB (T1b, N0, M0) - Signed by Jerzy Brannon MD on 2/12/2018        Previous therapy:  Oncology History   Endometrial cancer ([facility] )   11/17/2017 Initial Diagnosis    Endometrial cancer ([facility] )     11/17/2017 Biopsy    ENDOMETRIAL BIOPSY: WELL DIFFERENTIATED ENDOMETRIAL ADENOCARCINOMA (FIGO I) WITH FOCALMUCINOUS FEATURES      Part B: ENDOCERVICAL POLYP:BENIGN ENDOCERVICAL      12/19/2017 Surgery    Robotic assisted total laparoscopic hysterectomy with bilateral salpingo-oophorectomy and sentinel bilateral pelvic lymph node dissection  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 (4 4 x 3 2 cm tumor, 9 4/15 4mm invasion, NO LVSI, washings revealed atypical cellular changes)     12/19/2017 Genetic Testing    Morrison testing negative     6/28/2019 Biopsy    A  Breast, Right, US BX Right Breast 1000 4 cmfn:  - Benign breast tissue with focal histiocytic aggregate  See comment   - Negative for atypia and in-situ or invasive carcinoma  7/8/2019 Recurrence    Presented with right lower extremity DVT and CT demonstrating right pelvic sidewall mass with venous, ureteral and nerve compression causing significant neuropathic pain  Biopsy:  Lymph Node, Right pelvic lymph node x3:  - High-grade adenocarcinoma  7/30/2019 - 1/6/2020 Chemotherapy    Taxol 175 mg/m2 and carboplatin AUC 6 every 21 days  Dose was reduced to taxol 135 mg/m2 and carboplatin AUC 5  Completed 6 cycles  Treatment protracted due to multiple hospital admissions  2/24/2020 - 4/13/2020 Radiation    adjuvant external beam radiation therapy to the whole pelvis to 4500 cGy followed by boost to gross disease of an additional 2200 cGy     11/23/2020 Progression    New necrotic adenopathy in the retroperitoneum on CT      12/21/2020 -  Chemotherapy    Began chemotherapy with rucaparib, atezolizumab, and bevacizumab as per [facility] clinical trial            Patient ID: Adriana Angel is a 63 y o  female     Patient has no interval concerns  Reports nausea with no vomiting, low appetite, and constipation  Denies diarrhea, abdominal pain, pelvic pain, changes in bladder function, and vaginal bleeding/discharge  She is without SOB, CP, and bloating  She is ambulatory and independent at home            The following portions of the patient's history were reviewed and updated as appropriate: allergies, current medications, past family history, past medical history, past social history, past surgical history and problem list     Review of Systems   Constitutional: Positive for fatigue  Negative for activity change, appetite change, chills, fever and unexpected weight change  HENT: Negative for mouth sores  Eyes: Negative  Respiratory: Negative for cough, chest tightness, shortness of breath and wheezing  Cardiovascular: Negative for chest pain, palpitations and leg swelling  Gastrointestinal: Positive for constipation and nausea  Negative for abdominal distention, abdominal pain, anal bleeding, blood in stool, diarrhea and vomiting  Endocrine: Negative  Genitourinary: Negative for difficulty urinating, dysuria, flank pain, frequency, hematuria, pelvic pain, urgency, vaginal bleeding, vaginal discharge and vaginal pain  Musculoskeletal: Negative for arthralgias and myalgias  Skin: Negative for color change, pallor and rash  Neurological: Negative for dizziness, weakness, numbness and headaches  Hematological: Negative  Psychiatric/Behavioral: The patient is not nervous/anxious          Current Outpatient Medications   Medication Sig Dispense Refill    acetaminophen (TYLENOL) 325 mg tablet Take 2 tablets (650 mg total) by mouth every 6 (six) hours as needed for mild pain, headaches or fever 30 tablet 0    ARIPiprazole (ABILIFY) 10 mg tablet Take 10 mg by mouth daily      aspirin (ECOTRIN LOW STRENGTH) 81 mg EC tablet Take 81 mg by mouth daily      Calcium-Magnesium-Vitamin D (CALCIUM 500 PO) Take by mouth daily       cholecalciferol (VITAMIN D3) 1,000 units tablet Take 1,000 Units by mouth daily      Cyanocobalamin (VITAMIN B12 PO) Take by mouth daily       enoxaparin (LOVENOX) 120 mg/0 8 mL INJECT 0 8 ML (120 MG TOTAL) UNDER THE SKIN DAILY 24 Syringe 3    folic acid (FOLVITE) 1 mg tablet Take 1 tablet (1 mg total) by mouth daily  0    gemfibrozil (LOPID) 600 mg tablet TAKE 1 TABLET BY MOUTH EVERY DAY 90 tablet 1    LORazepam (ATIVAN) 0 5 mg tablet Take 1 tablet (0 5 mg total) by mouth every 6 (six) hours as needed for anxiety (or nausea) 36 tablet 1    Multiple Vitamin (MULTIVITAMIN) tablet Take 1 tablet by mouth daily      Myrbetriq 50 MG TB24 TAKE 1 TABLET BY MOUTH EVERY DAY 30 tablet 5    omeprazole (PriLOSEC) 20 mg delayed release capsule Take 20 mg by mouth daily      ondansetron (ZOFRAN) 4 mg tablet Take 1 tablet (4 mg total) by mouth every 8 (eight) hours as needed for nausea or vomiting 30 tablet 0    oxybutynin (DITROPAN XL) 15 MG 24 hr tablet Take 1 tablet (15 mg total) by mouth daily at bedtime 90 tablet 3    quinapril (ACCUPRIL) 5 mg tablet TAKE 1 TABLET BY MOUTH EVERYDAY AT BEDTIME 90 tablet 1    rucaparib (RUBRACA) 300 mg tablet Take 600 mg by mouth every 12 (twelve) hours Take with or without food  Do not repeat a vomited dose   saccharomyces boulardii (FLORASTOR) 250 mg capsule Take 1 capsule (250 mg total) by mouth 2 (two) times a day  0    Sodium Chloride Flush (Normal Saline Flush) 0 9 % SOLN       venlafaxine (EFFEXOR) 75 mg tablet Take 75 mg by mouth 3 (three) times a day        nitrofurantoin (MACROBID) 100 mg capsule Take 1 capsule (100 mg total) by mouth 2 (two) times a day 14 capsule 0    sodium chloride, PF, 0 9 % 10 mL by Intracatheter route daily for 30 doses 10cc syringes 300 mL 3     No current facility-administered medications for this visit  Objective:    Blood pressure 146/72, pulse 77, temperature 98 3 °F (36 8 °C), temperature source Temporal, resp  rate 18, height 4' 11" (1 499 m), weight 85 3 kg (188 lb), not currently breastfeeding  Body mass index is 37 97 kg/m²  Body surface area is 1 8 meters squared  Physical Exam  Vitals signs reviewed  Constitutional:       General: She is not in acute distress  Appearance: Normal appearance  She is not ill-appearing  HENT:      Head: Normocephalic and atraumatic  Mouth/Throat:      Mouth: Mucous membranes are moist    Eyes:      General: No scleral icterus          Right eye: No discharge  Left eye: No discharge  Conjunctiva/sclera: Conjunctivae normal    Pulmonary:      Effort: Pulmonary effort is normal  No respiratory distress  Musculoskeletal:      Right lower leg: No edema  Left lower leg: No edema  Skin:     General: Skin is warm and dry  Coloration: Skin is not jaundiced  Findings: No rash  Neurological:      General: No focal deficit present  Mental Status: She is alert and oriented to person, place, and time  Cranial Nerves: No cranial nerve deficit  Sensory: No sensory deficit  Motor: No weakness  Gait: Gait normal    Psychiatric:         Mood and Affect: Mood normal          Behavior: Behavior normal          Thought Content:  Thought content normal          Judgment: Judgment normal          No results found for:   Lab Results   Component Value Date     10/27/2017    K 4 4 03/12/2021     03/12/2021    CO2 24 03/12/2021    BUN 16 03/12/2021    CREATININE 1 10 03/12/2021    GLUCOSE 219 (H) 12/19/2017    GLUF 108 (H) 03/12/2021    CALCIUM 9 9 03/12/2021    CORRECTEDCA 10 2 (H) 12/09/2019    AST 30 03/12/2021    ALT 42 03/12/2021    ALKPHOS 159 (H) 03/12/2021    PROT 6 9 10/27/2017    BILITOT 0 3 10/27/2017    EGFR 54 03/12/2021     Lab Results   Component Value Date    WBC 6 75 03/12/2021    HGB 10 8 (L) 03/12/2021    HCT 32 8 (L) 03/12/2021    MCV 95 03/12/2021     03/12/2021     Lab Results   Component Value Date    NEUTROABS 5 04 03/12/2021        Trend:  No results found for:

## 2021-03-12 NOTE — PROGRESS NOTES
Labs have been reviewed and signed by Dr Gorge Linder on 3/12/21  Per protocol, patient is ok to proceed with Cycle 5 Day 1 treatment on 3/15/21

## 2021-03-12 NOTE — PROGRESS NOTES
Patient was seen in the office for her Cycle 5 Day 1 pre treatment office visit with Harsh Mejia  Patient AEs and Conmeds were reviewed  Patient reported denied changes to her conmeds, but reported that she is experiencing post nasal drip with sneezing, and that she is still experiencing nausea  She stated that she is not feeling hungry and doesn't eat/drink as often  She also reported that she got her 1st COVID vaccine shot on 3/10/21, and was experiencing a sore arm from this  Upon review of her urinalysis completed today, 3/12/21, it appears that the patient could have a UTI  RAFITA Mejia, ordered nitrofurantoin (MACROBID) 100 mg capsule to be taken for 7 days  Overall, patient states that she is feeling okay, and was told to call with any questions or concerns  Patient was made aware of future appointments

## 2021-03-12 NOTE — ASSESSMENT & PLAN NOTE
53-year-old with recurrent stage IB endometrial cancer who is s/p cycle 5 of treatment on the [facility] trial  She is tolerating treatment well  Has low appetite  She constipated, but is managing this with Miralax and mineral oil  Her performance status is 1  Patient will proceed with treatment on Monday  Labs pending  She will return to the office as per clinical trial scheduling

## 2021-03-12 NOTE — PLAN OF CARE
Problem: Potential for Falls  Goal: Patient will remain free of falls  Description: INTERVENTIONS:  - Assess patient frequently for physical needs  -  Identify cognitive and physical deficits and behaviors that affect risk of falls    -  [place] fall precautions as indicated by assessment   - Educate patient/family on patient safety including physical limitations  - Instruct patient to call for assistance with activity based on assessment  - Modify environment to reduce risk of injury  - Consider OT/PT consult to assist with strengthening/mobility  Outcome: Progressing

## 2021-03-15 ENCOUNTER — HOSPITAL ENCOUNTER (OUTPATIENT)
Dept: INFUSION CENTER | Facility: CLINIC | Age: 66
Discharge: HOME | End: 2021-03-15
Payer: COMMERCIAL

## 2021-03-15 ENCOUNTER — NUTRITION (OUTPATIENT)
Dept: NUTRITION | Facility: CLINIC | Age: 66
End: 2021-03-15

## 2021-03-15 ENCOUNTER — DOCUMENTATION (OUTPATIENT)
Dept: OTHER | Facility: HOSPITAL | Age: 66
End: 2021-03-15

## 2021-03-15 VITALS
RESPIRATION RATE: 18 BRPM | BODY MASS INDEX: 37.77 KG/M2 | OXYGEN SATURATION: 98 % | DIASTOLIC BLOOD PRESSURE: 86 MMHG | TEMPERATURE: 96.6 F | SYSTOLIC BLOOD PRESSURE: 132 MMHG | HEART RATE: 71 BPM | WEIGHT: 187 LBS

## 2021-03-15 DIAGNOSIS — E78.5 DYSLIPIDEMIA: ICD-10-CM

## 2021-03-15 DIAGNOSIS — Z71.3 NUTRITIONAL COUNSELING: Primary | ICD-10-CM

## 2021-03-15 PROCEDURE — 96417 CHEMO IV INFUS EACH ADDL SEQ: CPT

## 2021-03-15 PROCEDURE — J9035Q0 INV BEVACIZUMAB 400MG/16ML 16 ML: Performed by: OBSTETRICS & GYNECOLOGY

## 2021-03-15 PROCEDURE — J9999Q0 INV ATEZOLIZUMAB 1200MG/20ML 20 ML: Performed by: OBSTETRICS & GYNECOLOGY

## 2021-03-15 PROCEDURE — 96413 CHEMO IV INFUSION 1 HR: CPT

## 2021-03-15 RX ADMIN — SODIUM CHLORIDE 20 ML/HR: 0.9 INJECTION, SOLUTION INTRAVENOUS at 09:16

## 2021-03-15 RX ADMIN — Medication 1358 MG: at 10:32

## 2021-03-15 RX ADMIN — Medication 1200 MG: at 09:46

## 2021-03-15 NOTE — PROGRESS NOTES
Patient was seen in [facility] for Cycle 5 Day 1 treatment  Patient was given Cycle 5 Day 1 Rucaparib pills and drug diary, and patient returned Cycle 4 study pills and drug diary  Patient denied any changed to AEs and Conmeds  Patient reported that she was feeling well this morning  Patient schedule was reviewed, and she was told to call with any questions or concerns  Patient tolerate treatment without incident today

## 2021-03-15 NOTE — PATIENT INSTRUCTIONS
Nutrition Rx & Recommendations:  · Small, frequent meals/snacks may be easier to tolerate than 3 large daily meals  Aim for 5-6 small meals per day (every 2-3 hours)  · Include protein at all meals/snacks  · Follow a Cancer Preventative Nutrition Pattern: colorful, plant-based, low-fat, avoid added sugars, limit alcohol, include high fiber foods, limit processed meats, limit red meat, choose lean protein sources, use low-fat cooking methods, balance calories with physical activity, avoid excessive weight gain throughout life  · Incorporate physical activity as able/allowed  · For appetite loss: try powdered or liquid nutrition supplements; eating by the clock every 2-3 hours; set a timer to remind yourself to eat, keep snacks nearby; add extra protein & calories to your diet; drink liquids in between meals, choose liquids that add calories; eat a bedtime snack; eat soft, cool or frozen foods; eat a larger meal when you feel well & rested; only sip small amounts of liquids during meals (see pages 10-12 in your Eating Hints book)  · For weight loss: monitor your weight at home at least 2x/week, record your weight, start by adding 250-500 extra calories per day, eat 5-6 small scheduled meals every 2-3 hrs, choose foods that are high in protein and calories (see pages 49-53 in your Eating Hints book), drink liquids with calories for example: milkshakes/smoothies/juice/soup/whole milk/chocolate milk, cook with protein fortified milk (see recipe on page 36 in your Eating Hints book), consider ready-to-drink oral nutrition supplements such as Ensure Plus, Ensure Enlive, Boost Plus, or Boost Very High Calorie, avoid "diet" and "light" foods when possible, avoid drinking too much with meals, contact your dietitian with any continued weight loss over the course of 1 week  For more info see pages 35-37 in your Eating Hints book    · For constipation: drink plenty of fluids (>64 oz/day); drink hot liquids; eat high-fiber foods as tolerated (whole grains, beans, peas, nuts, seeds, fruits, vegetables, etc); increase physical activity as tolerated  Avoid increasing fiber intake too quickly, add fiber into your diet slowly; keep a record of your bowel movements (see pages 13-14 in you Eating Hints book)  · Fluid Goal: 46-55 oz per day  Aim for at least 55 oz per day while having constipation  · Consider setting an alarm on your phone every 3 hrs as a reminder to eat  · Try adding your [place] Instant Breakfast shake in the morning as a snack  · Lifetime vitamin/mineral supplementation recommended for Gastric Bypass Surgery:  · Multivitamin BID  · Iron 45 mg daily  · Vitamin D daily  · Calcium Citrate 500 mg TID (do not take with iron, needs to be 2 hrs apart from iron)  · B12 1000 mcg daily    · Resources for recipes: [employer], cancerdietitian  com, oncxAd org/support/nutrition-and-cancer, SkinModebo, www oncologynutrition  org/erfc/recipes, www cookforyourlife  org, The Essential Cancer Treatment Nutrition Guide and Cookbook: Includes 616 Healthy and Delicious Recipes      Follow Up Plan: 4/26/21 during infusion   Recommend Referral to Other Providers: none at this time

## 2021-03-15 NOTE — PLAN OF CARE
Problem: Potential for Falls  Goal: Patient will remain free of falls  Description: INTERVENTIONS:  - Assess patient frequently for physical needs  -  Identify cognitive and physical deficits and behaviors that affect risk of falls  -  [place] fall precautions as indicated by assessment   - Educate patient/family on patient safety including physical limitations  - Instruct patient to call for assistance with activity based on assessment  - Modify environment to reduce risk of injury  - Consider OT/PT consult to assist with strengthening/mobility  Outcome: Progressing     Problem: Knowledge Deficit  Goal: Patient/family/caregiver demonstrates understanding of disease process, treatment plan, medications, and discharge instructions  Description: Complete learning assessment and assess knowledge base    Interventions:  - Provide teaching at level of understanding  - Provide teaching via preferred learning methods  Outcome: Progressing

## 2021-03-15 NOTE — PROGRESS NOTES
Patient is here for chemo  Labs reviewed and are singed by Dr Jordin Chavarria  Patient was placed on antibiotics for UTI by Ledy Keita  Clinical trials is also aware  Per clinical trials note from James Burgess patient is ok for  Treatment today  Patient offers no complaints at this time

## 2021-03-16 DIAGNOSIS — C54.1 ENDOMETRIAL CANCER (HCC): Primary | ICD-10-CM

## 2021-03-16 RX ORDER — GEMFIBROZIL 600 MG/1
TABLET, FILM COATED ORAL
Qty: 90 TABLET | Refills: 1 | Status: SHIPPED | OUTPATIENT
Start: 2021-03-16 | End: 2021-09-23

## 2021-03-19 DIAGNOSIS — C54.1 ENDOMETRIAL CANCER (HCC): Primary | ICD-10-CM

## 2021-03-22 ENCOUNTER — HOSPITAL ENCOUNTER (OUTPATIENT)
Dept: RADIOLOGY | Facility: HOSPITAL | Age: 66
Discharge: HOME | End: 2021-03-22
Attending: OBSTETRICS & GYNECOLOGY
Payer: COMMERCIAL

## 2021-03-22 ENCOUNTER — TRANSCRIBE ORDERS (OUTPATIENT)
Dept: RADIOLOGY | Facility: HOSPITAL | Age: 66
End: 2021-03-22

## 2021-03-22 DIAGNOSIS — C54.1 ENDOMETRIAL CANCER (HCC): ICD-10-CM

## 2021-03-22 PROCEDURE — G1004 CDSM NDSC: HCPCS

## 2021-03-22 PROCEDURE — 71260 CT THORAX DX C+: CPT

## 2021-03-22 PROCEDURE — 74177 CT ABD & PELVIS W/CONTRAST: CPT

## 2021-03-22 RX ADMIN — IOHEXOL 100 ML: 350 INJECTION, SOLUTION INTRAVENOUS at 07:42

## 2021-03-25 ENCOUNTER — HOSPITAL ENCOUNTER (OUTPATIENT)
Dept: NON INVASIVE DIAGNOSTICS | Facility: HOSPITAL | Age: 66
Discharge: HOME | End: 2021-03-25
Attending: OBSTETRICS & GYNECOLOGY
Payer: COMMERCIAL

## 2021-03-25 DIAGNOSIS — C54.1 ENDOMETRIAL CANCER (HCC): ICD-10-CM

## 2021-03-25 PROCEDURE — 93321 DOPPLER ECHO F-UP/LMTD STD: CPT | Performed by: INTERNAL MEDICINE

## 2021-03-25 PROCEDURE — 93325 DOPPLER ECHO COLOR FLOW MAPG: CPT | Performed by: INTERNAL MEDICINE

## 2021-03-25 PROCEDURE — 93308 TTE F-UP OR LMTD: CPT | Performed by: INTERNAL MEDICINE

## 2021-03-25 PROCEDURE — 93308 TTE F-UP OR LMTD: CPT

## 2021-03-26 DIAGNOSIS — C54.1 ENDOMETRIAL CANCER (HCC): Primary | ICD-10-CM

## 2021-04-02 ENCOUNTER — DOCUMENTATION (OUTPATIENT)
Dept: OTHER | Facility: HOSPITAL | Age: 66
End: 2021-04-02

## 2021-04-02 ENCOUNTER — OFFICE VISIT (OUTPATIENT)
Dept: GYNECOLOGIC ONCOLOGY | Facility: CLINIC | Age: 66
End: 2021-04-02
Payer: COMMERCIAL

## 2021-04-02 ENCOUNTER — HOSPITAL ENCOUNTER (OUTPATIENT)
Dept: INFUSION CENTER | Facility: HOSPITAL | Age: 66
Discharge: HOME | End: 2021-04-02
Payer: COMMERCIAL

## 2021-04-02 VITALS
BODY MASS INDEX: 37.4 KG/M2 | WEIGHT: 185.5 LBS | HEIGHT: 59 IN | TEMPERATURE: 98.1 F | RESPIRATION RATE: 17 BRPM | DIASTOLIC BLOOD PRESSURE: 64 MMHG | SYSTOLIC BLOOD PRESSURE: 116 MMHG | HEART RATE: 74 BPM

## 2021-04-02 VITALS — TEMPERATURE: 97.4 F

## 2021-04-02 DIAGNOSIS — I82.4Y1 ACUTE DEEP VEIN THROMBOSIS (DVT) OF PROXIMAL VEIN OF RIGHT LOWER EXTREMITY (HCC): ICD-10-CM

## 2021-04-02 DIAGNOSIS — T45.1X5A CHEMOTHERAPY-INDUCED NAUSEA: Primary | ICD-10-CM

## 2021-04-02 DIAGNOSIS — R06.00 EXERTIONAL DYSPNEA: ICD-10-CM

## 2021-04-02 DIAGNOSIS — C54.1 ENDOMETRIAL CANCER (HCC): Primary | ICD-10-CM

## 2021-04-02 DIAGNOSIS — R11.0 CHEMOTHERAPY-INDUCED NAUSEA: Primary | ICD-10-CM

## 2021-04-02 DIAGNOSIS — N13.5 OBSTRUCTION OF RIGHT URETER: ICD-10-CM

## 2021-04-02 DIAGNOSIS — K59.03 DRUG INDUCED CONSTIPATION: ICD-10-CM

## 2021-04-02 DIAGNOSIS — C54.1 ENDOMETRIAL CANCER (HCC): Chronic | ICD-10-CM

## 2021-04-02 DIAGNOSIS — Z45.2 ENCOUNTER FOR CENTRAL LINE CARE: ICD-10-CM

## 2021-04-02 PROBLEM — R31.9 URINARY TRACT INFECTION WITH HEMATURIA: Status: RESOLVED | Noted: 2020-08-06 | Resolved: 2021-04-02

## 2021-04-02 PROBLEM — N39.0 URINARY TRACT INFECTION WITH HEMATURIA: Status: RESOLVED | Noted: 2020-08-06 | Resolved: 2021-04-02

## 2021-04-02 PROBLEM — R06.09 EXERTIONAL DYSPNEA: Status: ACTIVE | Noted: 2021-04-02

## 2021-04-02 LAB
ALBUMIN SERPL BCP-MCNC: 3.7 G/DL (ref 3.5–5)
ALP SERPL-CCNC: 165 U/L (ref 46–116)
ALT SERPL W P-5'-P-CCNC: 52 U/L (ref 12–78)
AMYLASE SERPL-CCNC: 34 IU/L (ref 25–115)
ANION GAP SERPL CALCULATED.3IONS-SCNC: 7 MMOL/L (ref 4–13)
AST SERPL W P-5'-P-CCNC: 25 U/L (ref 5–45)
BASOPHILS # BLD AUTO: 0.05 THOUSANDS/ΜL (ref 0–0.1)
BASOPHILS NFR BLD AUTO: 1 % (ref 0–1)
BILIRUB SERPL-MCNC: 0.32 MG/DL (ref 0.2–1)
BUN SERPL-MCNC: 17 MG/DL (ref 5–25)
CALCIUM SERPL-MCNC: 10 MG/DL (ref 8.3–10.1)
CHLORIDE SERPL-SCNC: 108 MMOL/L (ref 100–108)
CHOLEST SERPL-MCNC: 188 MG/DL (ref 50–200)
CO2 SERPL-SCNC: 21 MMOL/L (ref 21–32)
CREAT SERPL-MCNC: 1.07 MG/DL (ref 0.6–1.3)
EOSINOPHIL # BLD AUTO: 0.03 THOUSAND/ΜL (ref 0–0.61)
EOSINOPHIL NFR BLD AUTO: 0 % (ref 0–6)
ERYTHROCYTE [DISTWIDTH] IN BLOOD BY AUTOMATED COUNT: 15.1 % (ref 11.6–15.1)
GFR SERPL CREATININE-BSD FRML MDRD: 55 ML/MIN/1.73SQ M
GGT SERPL-CCNC: 8 U/L (ref 5–85)
GLUCOSE P FAST SERPL-MCNC: 105 MG/DL (ref 65–99)
GLUCOSE SERPL-MCNC: 105 MG/DL (ref 65–140)
HCT VFR BLD AUTO: 33.1 % (ref 34.8–46.1)
HGB BLD-MCNC: 10.7 G/DL (ref 11.5–15.4)
IMM GRANULOCYTES # BLD AUTO: 0.11 THOUSAND/UL (ref 0–0.2)
IMM GRANULOCYTES NFR BLD AUTO: 1 % (ref 0–2)
LIPASE SERPL-CCNC: 165 U/L (ref 73–393)
LYMPHOCYTES # BLD AUTO: 0.8 THOUSANDS/ΜL (ref 0.6–4.47)
LYMPHOCYTES NFR BLD AUTO: 9 % (ref 14–44)
MAGNESIUM SERPL-MCNC: 2.3 MG/DL (ref 1.6–2.6)
MCH RBC QN AUTO: 31.1 PG (ref 26.8–34.3)
MCHC RBC AUTO-ENTMCNC: 32.3 G/DL (ref 31.4–37.4)
MCV RBC AUTO: 96 FL (ref 82–98)
MONOCYTES # BLD AUTO: 0.98 THOUSAND/ΜL (ref 0.17–1.22)
MONOCYTES NFR BLD AUTO: 12 % (ref 4–12)
NEUTROPHILS # BLD AUTO: 6.53 THOUSANDS/ΜL (ref 1.85–7.62)
NEUTS SEG NFR BLD AUTO: 77 % (ref 43–75)
NRBC BLD AUTO-RTO: 0 /100 WBCS
PHOSPHATE SERPL-MCNC: 4 MG/DL (ref 2.3–4.1)
PLATELET # BLD AUTO: 344 THOUSANDS/UL (ref 149–390)
PMV BLD AUTO: 10.3 FL (ref 8.9–12.7)
POTASSIUM SERPL-SCNC: 4.1 MMOL/L (ref 3.5–5.3)
PROT SERPL-MCNC: 8.5 G/DL (ref 6.4–8.2)
RBC # BLD AUTO: 3.44 MILLION/UL (ref 3.81–5.12)
SODIUM SERPL-SCNC: 136 MMOL/L (ref 136–145)
T3 SERPL-MCNC: 1.2 NG/ML (ref 0.6–1.8)
T4 FREE SERPL-MCNC: 1.2 NG/DL (ref 0.76–1.46)
TSH SERPL DL<=0.05 MIU/L-ACNC: 1.91 UIU/ML (ref 0.36–3.74)
WBC # BLD AUTO: 8.5 THOUSAND/UL (ref 4.31–10.16)

## 2021-04-02 PROCEDURE — 85025 COMPLETE CBC W/AUTO DIFF WBC: CPT

## 2021-04-02 PROCEDURE — 99214 OFFICE O/P EST MOD 30 MIN: CPT | Performed by: NURSE PRACTITIONER

## 2021-04-02 PROCEDURE — 82150 ASSAY OF AMYLASE: CPT

## 2021-04-02 PROCEDURE — 84480 ASSAY TRIIODOTHYRONINE (T3): CPT

## 2021-04-02 PROCEDURE — 83735 ASSAY OF MAGNESIUM: CPT

## 2021-04-02 PROCEDURE — 82977 ASSAY OF GGT: CPT

## 2021-04-02 PROCEDURE — 83690 ASSAY OF LIPASE: CPT

## 2021-04-02 PROCEDURE — 82465 ASSAY BLD/SERUM CHOLESTEROL: CPT

## 2021-04-02 PROCEDURE — 84443 ASSAY THYROID STIM HORMONE: CPT

## 2021-04-02 PROCEDURE — 80053 COMPREHEN METABOLIC PANEL: CPT

## 2021-04-02 PROCEDURE — 84100 ASSAY OF PHOSPHORUS: CPT

## 2021-04-02 PROCEDURE — 84439 ASSAY OF FREE THYROXINE: CPT

## 2021-04-02 RX ORDER — ONDANSETRON HYDROCHLORIDE 8 MG/1
8 TABLET, FILM COATED ORAL EVERY 8 HOURS PRN
Qty: 30 TABLET | Refills: 1 | Status: SHIPPED | OUTPATIENT
Start: 2021-04-02 | End: 2021-08-06 | Stop reason: SDUPTHER

## 2021-04-02 NOTE — PROGRESS NOTES
Port accessed and labs drawn as ordered  Pt was unable to provide a urine sample at this time   Declined avs

## 2021-04-02 NOTE — PROGRESS NOTES
Assessment/Plan:    Problem List Items Addressed This Visit        Digestive    Drug induced constipation     Ongoing  Noted on recent CT  Patient will continue to take Miralax daily and mineral oil PRN  Increase water intake to 64 oz daily  Cardiovascular and Mediastinum    Acute deep vein thrombosis (DVT) of proximal vein of lower extremity (HCC)     Anticoagulated on Lovenox  Will need treatment for life given active malignancy  Genitourinary    Endometrial cancer ([facility] [address] ) (Chronic)     46-year-old with recurrent stage IB endometrial cancer who is s/p cycle 6 of treatment on the [facility] trial  CT scan recently performed on 3/22 shows stable disease  She continues to tolerate treatment with minimal difficulty  Her performance status is 1  Patient will proceed with treatment on Monday  Labs previewed  She will return to the office as per clinical trial scheduling  Obstruction of right ureter     Right nephrostomy tube connected to drainage  Patient is concerned that there is a tinge of blood in the tubing  It is draining normally  Urine appears clear  Insertion site on the right back is without evidence of infection  No dysuria or fevers  Treated for UTI 3 weeks ago - she completed course of Macrobid  Will consider re-culture if any above symptoms worsen or re-present  Encouraged patient to drink at least 64 oz of water daily  She will call if she develops any symptoms of infection or if there is increased blood or mucous in the tubing  Other    Chemotherapy-induced nausea - Primary     Patient occasionally nauseated  No vomiting  Zofran refilled  Relevant Medications    ondansetron (ZOFRAN) 8 mg tablet    Exertional dyspnea     New within the past two weeks, as patient has been making more of an effort to exercise (walking)   Do not particularly suspect PE given that she is anticoagulated on Lovenox, and denies CP, SOB at rest, lightheadedness, etc  She understands, however, that if her symptoms increase or worsen she should be seen in the ED or we would order a CTA of the chest to rule out a blood clot  Recent CT on 3/22/21 showed no abnormalities in the chest                  CHIEF COMPLAINT: Pre-chemo evaluation      Problem:  Cancer Staging  Endometrial cancer [facility])  Staging form: Corpus Uteri - Carcinoma, AJCC 7th Edition  - Clinical stage from 12/19/2017: FIGO Stage IB (T1b, N0, M0) - Signed by Madeline Moore MD on 2/12/2018        Previous therapy:  Oncology History   Endometrial cancer ([facility] [address] )   11/17/2017 Initial Diagnosis    Endometrial cancer ([facility] [address] )     11/17/2017 Biopsy    ENDOMETRIAL BIOPSY: WELL DIFFERENTIATED ENDOMETRIAL ADENOCARCINOMA (FIGO I) WITH FOCALMUCINOUS FEATURES  Part B: ENDOCERVICAL POLYP:BENIGN ENDOCERVICAL      12/19/2017 Surgery    Robotic assisted total laparoscopic hysterectomy with bilateral salpingo-oophorectomy and sentinel bilateral pelvic lymph node dissection  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 (4 4 x 3 2 cm tumor, 9 4/15 4mm invasion, NO LVSI, washings revealed atypical cellular changes)     12/19/2017 Genetic Testing    Morrison testing negative     6/28/2019 Biopsy    A  Breast, Right, US BX Right Breast 1000 4 cmfn:  - Benign breast tissue with focal histiocytic aggregate  See comment   - Negative for atypia and in-situ or invasive carcinoma  7/8/2019 Recurrence    Presented with right lower extremity DVT and CT demonstrating right pelvic sidewall mass with venous, ureteral and nerve compression causing significant neuropathic pain  Biopsy:  Lymph Node, Right pelvic lymph node x3:  - High-grade adenocarcinoma  7/30/2019 - 1/6/2020 Chemotherapy    Taxol 175 mg/m2 and carboplatin AUC 6 every 21 days  Dose was reduced to taxol 135 mg/m2 and carboplatin AUC 5  Completed 6 cycles  Treatment protracted due to multiple hospital admissions       2/24/2020 - 4/13/2020 Radiation    adjuvant external beam radiation therapy to the whole pelvis to 4500 cGy followed by boost to gross disease of an additional 2200 cGy     11/23/2020 Progression    New necrotic adenopathy in the retroperitoneum on CT      12/21/2020 -  Chemotherapy    Began chemotherapy with rucaparib, atezolizumab, and bevacizumab as per [facility] clinical trial            Patient ID: Sunshine Cheema is a 63 y o  female     Patient presents prior to 6th treatment cycle on Endo[facility] trial  She has nausea which sometimes progresses to dry heaving  She is constipated and reports that Miralax and mineral oil are mostly relieving of this  No abdominal pain, pelvic pain, changes in bladder function, and vaginal bleeding/discharge  She is mildly concerned about her nephrostomy tube as she notes she saw a small amount of blood in it  It is draining normally  She is mostly voiding from "down below " Endorses a fair appetite and is without SOB, CP, and bloating  She is ambulatory and independent at home with a cane  Wishes to increase her exercise tolerance so she has been moving more over the past two weeks  During these times she fatigues easily  The following portions of the patient's history were reviewed and updated as appropriate: allergies, current medications, past family history, past medical history, past social history, past surgical history and problem list     Review of Systems   Constitutional: Positive for fatigue  Negative for activity change, appetite change, chills, fever and unexpected weight change  HENT: Negative for mouth sores  Eyes: Negative  Respiratory: Negative for cough, chest tightness, shortness of breath and wheezing  Cardiovascular: Negative for chest pain, palpitations and leg swelling  Gastrointestinal: Negative for abdominal distention, abdominal pain, anal bleeding, blood in stool, constipation, diarrhea, nausea and vomiting  Endocrine: Negative      Genitourinary: Negative for difficulty urinating, dysuria, flank pain, frequency, hematuria, pelvic pain, urgency, vaginal bleeding, vaginal discharge and vaginal pain  Musculoskeletal: Positive for gait problem  Negative for arthralgias and myalgias  Skin: Negative for color change, pallor and rash  Neurological: Negative for dizziness, weakness, numbness and headaches  Hematological: Negative  Psychiatric/Behavioral: The patient is not nervous/anxious          Current Outpatient Medications   Medication Sig Dispense Refill    acetaminophen (TYLENOL) 325 mg tablet Take 2 tablets (650 mg total) by mouth every 6 (six) hours as needed for mild pain, headaches or fever 30 tablet 0    ARIPiprazole (ABILIFY) 10 mg tablet Take 10 mg by mouth daily      aspirin (ECOTRIN LOW STRENGTH) 81 mg EC tablet Take 81 mg by mouth daily      Calcium-Magnesium-Vitamin D (CALCIUM 500 PO) Take by mouth daily       cholecalciferol (VITAMIN D3) 1,000 units tablet Take 1,000 Units by mouth daily      Cyanocobalamin (VITAMIN B12 PO) Take by mouth daily       enoxaparin (LOVENOX) 120 mg/0 8 mL INJECT 0 8 ML (120 MG TOTAL) UNDER THE SKIN DAILY 24 Syringe 3    folic acid (FOLVITE) 1 mg tablet Take 1 tablet (1 mg total) by mouth daily  0    gemfibrozil (LOPID) 600 mg tablet TAKE 1 TABLET BY MOUTH EVERY DAY 90 tablet 1    LORazepam (ATIVAN) 0 5 mg tablet Take 1 tablet (0 5 mg total) by mouth every 6 (six) hours as needed for anxiety (or nausea) 36 tablet 1    Multiple Vitamin (MULTIVITAMIN) tablet Take 1 tablet by mouth daily      Myrbetriq 50 MG TB24 TAKE 1 TABLET BY MOUTH EVERY DAY 30 tablet 5    nitrofurantoin (MACROBID) 100 mg capsule Take 1 capsule (100 mg total) by mouth 2 (two) times a day 14 capsule 0    omeprazole (PriLOSEC) 20 mg delayed release capsule Take 20 mg by mouth daily      oxybutynin (DITROPAN XL) 15 MG 24 hr tablet Take 1 tablet (15 mg total) by mouth daily at bedtime 90 tablet 3    quinapril (ACCUPRIL) 5 mg tablet TAKE 1 TABLET BY MOUTH EVERYDAY AT BEDTIME 90 tablet 1    rucaparib (RUBRACA) 300 mg tablet Take 600 mg by mouth every 12 (twelve) hours Take with or without food  Do not repeat a vomited dose   saccharomyces boulardii (FLORASTOR) 250 mg capsule Take 1 capsule (250 mg total) by mouth 2 (two) times a day  0    Sodium Chloride Flush (Normal Saline Flush) 0 9 % SOLN       venlafaxine (EFFEXOR) 75 mg tablet Take 75 mg by mouth 3 (three) times a day        ondansetron (ZOFRAN) 8 mg tablet Take 1 tablet (8 mg total) by mouth every 8 (eight) hours as needed for nausea or vomiting 30 tablet 1    sodium chloride, PF, 0 9 % 10 mL by Intracatheter route daily for 30 doses 10cc syringes 300 mL 3     No current facility-administered medications for this visit  Objective:    Blood pressure 116/64, pulse 74, temperature 98 1 °F (36 7 °C), temperature source Temporal, resp  rate 17, height 4' 11" (1 499 m), weight 84 1 kg (185 lb 8 oz), not currently breastfeeding  Body mass index is 37 47 kg/m²  Body surface area is 1 79 meters squared  Physical Exam  Vitals signs reviewed  Constitutional:       General: She is not in acute distress  Appearance: Normal appearance  She is not ill-appearing  HENT:      Head: Normocephalic and atraumatic  Mouth/Throat:      Mouth: Mucous membranes are moist    Eyes:      General: No scleral icterus  Right eye: No discharge  Left eye: No discharge  Conjunctiva/sclera: Conjunctivae normal    Pulmonary:      Effort: Pulmonary effort is normal    Genitourinary:     Comments: Right nephrostomy tube with urine in bag  Tubing with some sediment, does not appear to be occluded  Insertion site appears normal - no skin redness, drainage, or induration  Musculoskeletal:      Right lower leg: No edema  Left lower leg: No edema  Skin:     General: Skin is warm and dry  Coloration: Skin is not jaundiced  Findings: No rash     Neurological:      General: No focal deficit present  Mental Status: She is alert and oriented to person, place, and time  Cranial Nerves: No cranial nerve deficit  Sensory: No sensory deficit  Motor: No weakness  Gait: Gait normal    Psychiatric:         Mood and Affect: Mood normal          Behavior: Behavior normal          Thought Content:  Thought content normal          Judgment: Judgment normal          No results found for:   Lab Results   Component Value Date     10/27/2017    K 4 1 04/02/2021     04/02/2021    CO2 21 04/02/2021    BUN 17 04/02/2021    CREATININE 1 07 04/02/2021    GLUCOSE 219 (H) 12/19/2017    GLUF 105 (H) 04/02/2021    CALCIUM 10 0 04/02/2021    CORRECTEDCA 10 2 (H) 12/09/2019    AST 25 04/02/2021    ALT 52 04/02/2021    ALKPHOS 165 (H) 04/02/2021    PROT 6 9 10/27/2017    BILITOT 0 3 10/27/2017    EGFR 55 04/02/2021     Lab Results   Component Value Date    WBC 8 50 04/02/2021    HGB 10 7 (L) 04/02/2021    HCT 33 1 (L) 04/02/2021    MCV 96 04/02/2021     04/02/2021     Lab Results   Component Value Date    NEUTROABS 6 53 04/02/2021        Trend:  No results found for:

## 2021-04-02 NOTE — PROGRESS NOTES
Patient was seen in the office for her Cycle 5 Day 1 pre treatment office visit with Isabel Kuo, Harsh Conrad  Patient AEs and Conmeds were reviewed  Patient denied changes to her conmeds, but reported that she is experiencing trouble with her nephrostomy tube, stating that she is noticing occasional blood and sediment in the tube  It was suggested to the patient to make sure that she is in-taking enough fluids so that this may aid in flushing out the tube  Patient reported increased shortness of breath over the past two weeks, but this could be due to deconditioning  Patient also reported some dry heaving, but no vomiting  CT Scan and ECHO were reviewed; the CT Scan showed no significant changes and the ECHO was normal  Patient will be receiving second COVID vaccine shot on 4/7/21  Overall, patient states that she is feeling okay, and was told to call with any questions or concerns  Patient was made aware of future appointments

## 2021-04-02 NOTE — PLAN OF CARE
Problem: Potential for Falls  Goal: Patient will remain free of falls  Description: INTERVENTIONS:  - Assess patient frequently for physical needs  -  Identify cognitive and physical deficits and behaviors that affect risk of falls    -  [place] fall precautions as indicated by assessment   - Educate patient/family on patient safety including physical limitations  - Instruct patient to call for assistance with activity based on assessment  - Modify environment to reduce risk of injury  - Consider OT/PT consult to assist with strengthening/mobility  Outcome: Progressing

## 2021-04-02 NOTE — ASSESSMENT & PLAN NOTE
81-year-old with recurrent stage IB endometrial cancer who is s/p cycle 6 of treatment on the [facility] trial  CT scan recently performed on 3/22 shows stable disease  She continues to tolerate treatment with minimal difficulty  Her performance status is 1  Patient will proceed with treatment on Monday  Labs previewed  She will return to the office as per clinical trial scheduling

## 2021-04-02 NOTE — ASSESSMENT & PLAN NOTE
New within the past two weeks, as patient has been making more of an effort to exercise (walking)  Do not particularly suspect PE given that she is anticoagulated on Lovenox, and denies CP, SOB at rest, lightheadedness, etc  She understands, however, that if her symptoms increase or worsen she should be seen in the ED or we would order a CTA of the chest to rule out a blood clot   Recent CT on 3/22/21 showed no abnormalities in the chest

## 2021-04-02 NOTE — ASSESSMENT & PLAN NOTE
Ongoing  Noted on recent CT  Patient will continue to take Miralax daily and mineral oil PRN  Increase water intake to 64 oz daily

## 2021-04-02 NOTE — PROGRESS NOTES
Labs have been reviewed and signed by Dr Jett Granda on 4/2/21  Per protocol, patient is ok to proceed with Cycle 6 Day 1 treatment on 4/5/21

## 2021-04-02 NOTE — ASSESSMENT & PLAN NOTE
Right nephrostomy tube connected to drainage  Patient is concerned that there is a tinge of blood in the tubing  It is draining normally  Urine appears clear  Insertion site on the right back is without evidence of infection  No dysuria or fevers  Treated for UTI 3 weeks ago - she completed course of Macrobid  Will consider re-culture if any above symptoms worsen or re-present  Encouraged patient to drink at least 64 oz of water daily  She will call if she develops any symptoms of infection or if there is increased blood or mucous in the tubing

## 2021-04-05 ENCOUNTER — HOSPITAL ENCOUNTER (OUTPATIENT)
Dept: INFUSION CENTER | Facility: CLINIC | Age: 66
Discharge: HOME | End: 2021-04-05
Payer: COMMERCIAL

## 2021-04-05 ENCOUNTER — DOCUMENTATION (OUTPATIENT)
Dept: OTHER | Facility: HOSPITAL | Age: 66
End: 2021-04-05

## 2021-04-05 VITALS
TEMPERATURE: 97.5 F | WEIGHT: 185.5 LBS | OXYGEN SATURATION: 99 % | HEART RATE: 74 BPM | BODY MASS INDEX: 37.47 KG/M2 | RESPIRATION RATE: 16 BRPM | SYSTOLIC BLOOD PRESSURE: 124 MMHG | DIASTOLIC BLOOD PRESSURE: 80 MMHG

## 2021-04-05 DIAGNOSIS — Z45.2 ENCOUNTER FOR CENTRAL LINE CARE: Primary | ICD-10-CM

## 2021-04-05 DIAGNOSIS — C54.1 ENDOMETRIAL CANCER (HCC): ICD-10-CM

## 2021-04-05 DIAGNOSIS — R30.0 DYSURIA: Primary | ICD-10-CM

## 2021-04-05 DIAGNOSIS — N39.0 URINARY TRACT INFECTION WITHOUT HEMATURIA, SITE UNSPECIFIED: ICD-10-CM

## 2021-04-05 LAB
BACTERIA UR QL AUTO: ABNORMAL /HPF
BILIRUB UR QL STRIP: NEGATIVE
CLARITY UR: ABNORMAL
COLOR UR: ABNORMAL
CREAT UR-MCNC: 37 MG/DL
GLUCOSE UR STRIP-MCNC: NEGATIVE MG/DL
HGB UR QL STRIP.AUTO: ABNORMAL
KETONES UR STRIP-MCNC: NEGATIVE MG/DL
LEUKOCYTE ESTERASE UR QL STRIP: ABNORMAL
NITRITE UR QL STRIP: NEGATIVE
NON-SQ EPI CELLS URNS QL MICRO: ABNORMAL /HPF
PH UR STRIP.AUTO: 6 [PH]
PROT UR STRIP-MCNC: ABNORMAL MG/DL
PROT UR-MCNC: 64 MG/DL
PROT/CREAT UR: 1.73 MG/G{CREAT} (ref 0–0.1)
RBC #/AREA URNS AUTO: ABNORMAL /HPF
SP GR UR STRIP.AUTO: 1.01 (ref 1–1.03)
UROBILINOGEN UR QL STRIP.AUTO: 0.2 E.U./DL
WBC #/AREA URNS AUTO: ABNORMAL /HPF

## 2021-04-05 PROCEDURE — 87086 URINE CULTURE/COLONY COUNT: CPT | Performed by: NURSE PRACTITIONER

## 2021-04-05 PROCEDURE — 96413 CHEMO IV INFUSION 1 HR: CPT

## 2021-04-05 PROCEDURE — 81001 URINALYSIS AUTO W/SCOPE: CPT | Performed by: NURSE PRACTITIONER

## 2021-04-05 PROCEDURE — J9999Q0 INV ATEZOLIZUMAB 1200MG/20ML 20 ML: Performed by: OBSTETRICS & GYNECOLOGY

## 2021-04-05 PROCEDURE — 96417 CHEMO IV INFUS EACH ADDL SEQ: CPT

## 2021-04-05 PROCEDURE — J9035Q0 INV BEVACIZUMAB 400MG/16ML 16 ML: Performed by: OBSTETRICS & GYNECOLOGY

## 2021-04-05 PROCEDURE — 84156 ASSAY OF PROTEIN URINE: CPT | Performed by: NURSE PRACTITIONER

## 2021-04-05 PROCEDURE — 82570 ASSAY OF URINE CREATININE: CPT | Performed by: NURSE PRACTITIONER

## 2021-04-05 RX ORDER — SULFAMETHOXAZOLE AND TRIMETHOPRIM 800; 160 MG/1; MG/1
1 TABLET ORAL EVERY 12 HOURS SCHEDULED
Qty: 14 TABLET | Refills: 0 | Status: SHIPPED | OUTPATIENT
Start: 2021-04-05 | End: 2021-04-12

## 2021-04-05 RX ORDER — SODIUM CHLORIDE 9 MG/ML
20 INJECTION, SOLUTION INTRAVENOUS CONTINUOUS
Status: DISCONTINUED | OUTPATIENT
Start: 2021-04-05 | End: 2021-04-08 | Stop reason: HOSPADM

## 2021-04-05 RX ADMIN — Medication 1200 MG: at 10:16

## 2021-04-05 RX ADMIN — Medication 1358 MG: at 10:56

## 2021-04-05 RX ADMIN — SODIUM CHLORIDE 20 ML/HR: 0.9 INJECTION, SOLUTION INTRAVENOUS at 09:44

## 2021-04-05 NOTE — PLAN OF CARE
Problem: Potential for Falls  Goal: Patient will remain free of falls  Description: INTERVENTIONS:  - Assess patient frequently for physical needs  -  Identify cognitive and physical deficits and behaviors that affect risk of falls  -  [place] fall precautions as indicated by assessment   - Educate patient/family on patient safety including physical limitations  - Instruct patient to call for assistance with activity based on assessment  - Modify environment to reduce risk of injury  - Consider OT/PT consult to assist with strengthening/mobility  Outcome: Progressing     Problem: Knowledge Deficit  Goal: Patient/family/caregiver demonstrates understanding of disease process, treatment plan, medications, and discharge instructions  Description: Complete learning assessment and assess knowledge base    Interventions:  - Provide teaching at level of understanding  - Provide teaching via preferred learning methods  Outcome: Progressing

## 2021-04-05 NOTE — PROGRESS NOTES
Patient tolerated her chemo without any adverse reactions  Kole SANCHEZ stated via teams that she sent in a script for antibiotics for patient because her urine sample looks like she has a UTI  Patient notified of this and to  her antibiotics at her pharmacy and she verbalized understanding   Next appointment confirmed and avs given

## 2021-04-05 NOTE — PROGRESS NOTES
Patient was seen in [address] [place] [address] for Cycle 6 Day 1 treatment  Patient was given Cycle 6 Rucaparib pills and drug diary, and patient returned Cycle 5 study pills and drug diary  Patient denied any changes to Conmeds, but reported that she believes she may have a UTI, as she has been experiencing urgency and painful urination since 4/3/21  Patient was told to call with any questions or concerns  Patient tolerated treatment without incident today

## 2021-04-05 NOTE — PROGRESS NOTES
Patient is here for chemo  Labs reviewed and meet parameters and are signed by MD  Per Danika Castro clinical trials note on 4/2/21 patient is ok to treat today  Patient states she think she has a UTI  She complains of burning with urniation/frequency and urgency  She states it started a few days ago  Notified Roderick Hunt and she stated she would place an orders for a urine sample  Patient was unable to void Friday for her scheduled urine sample  Miranda Chan states patient is ok to treat today as well   Danika Castro from clinical trials was also informed of patients possible UTI

## 2021-04-06 ENCOUNTER — HOSPITAL ENCOUNTER (OUTPATIENT)
Dept: RADIOLOGY | Facility: HOSPITAL | Age: 66
Discharge: HOME | End: 2021-04-06
Attending: RADIOLOGY | Admitting: RADIOLOGY
Payer: COMMERCIAL

## 2021-04-06 DIAGNOSIS — N13.5 OBSTRUCTION OF RIGHT URETER: ICD-10-CM

## 2021-04-06 LAB — BACTERIA UR CULT: NORMAL

## 2021-04-06 PROCEDURE — 50431 NJX PX NFROSGRM &/URTRGRM: CPT

## 2021-04-06 PROCEDURE — 50431 NJX PX NFROSGRM &/URTRGRM: CPT | Performed by: RADIOLOGY

## 2021-04-06 RX ORDER — LIDOCAINE WITH 8.4% SOD BICARB 0.9%(10ML)
SYRINGE (ML) INJECTION CODE/TRAUMA/SEDATION MEDICATION
Status: COMPLETED | OUTPATIENT
Start: 2021-04-06 | End: 2021-04-06

## 2021-04-06 RX ADMIN — IOHEXOL 10 ML: 350 INJECTION, SOLUTION INTRAVENOUS at 12:44

## 2021-04-06 RX ADMIN — Medication 5 ML: at 12:39

## 2021-04-06 NOTE — SEDATION DOCUMENTATION
Patient here for right PCNU tube check with concern for redness and puffiness at site  Tube in good position per Dr Bass Ear  Suture changed by Dr Bass Ear and patient discharged home

## 2021-04-06 NOTE — DISCHARGE INSTRUCTIONS
Nephrostomy Tube Care     WHAT YOU NEED TO KNOW:   A nephrostomy tube is a catheter (thin plastic tube) that is inserted through your skin and into your kidney  The nephrostomy tube drains urine from your kidney into a collecting bag outside your body  You may need a nephrostomy tube when something is blocking the normal flow of urine  A nephrostomy tube may be used for a short or a long period of time  The nephrostomy tube comes out of your back, so you will need someone to help care for your nephrostomy tube  DISCHARGE INSTRUCTIONS:      How to clean the skin around the nephrostomy tube and change the bandage:  Since the nephrostomy tube comes out of your back, you will not be able to care for it by yourself  Ask someone to follow the general directions below to check and care for your nephrostomy tube  Gather the items you will need  Disposable (single use) under-pad, and a clean washcloth  ¨ Plain soap, warm water, and new medical gloves  ¨ Sterile gauze bandages  ¨ Clear adhesive dressing or medical tape  ¨ Skin barrier  ¨ Protective skin film  ¨ Trash bag  · Remove the old bandage, and check the tube entry site  ¨ Have the patient lie on his side with the nephrostomy tube entry site facing up  Place the under-pad where it will catch drainage as you are working with the nephrostomy tube  ¨ Wash your hands with soap and water  Put on new medical gloves  ¨ Gently remove the old bandage, without pulling on the tube  Do this by holding the skin beside the tube with one hand  With the other hand, gently remove sticky tape and the skin barrier by pulling in the same direction as hair growth  Do not touch the side of the bandage that is placed over or around the tube  Throw the bandage and skin barrier away in a trash bag  ¨ Look for signs of infection, such as skin redness and swelling  Report any skin changes to healthcare providers  ¨ Clean the tube entry site      ¨ Hold the tube in place to keep it from being pulled out while you are cleaning around it  ¨ You will need to clean the area twice  For the first cleaning, wet a new gauze bandage with soap and water  Begin at the entry site of the tube  Wipe the skin in circles, moving away from the entry site  Remove blood and any other material with the gauze  Do this as often as needed  Use a new gauze bandage each time you clean the area, moving away from the entry site  ¨ For the second cleaning, wet a new gauze bandage with water  Begin at the entry site of the tube  Wipe the skin in circles, moving away from the entry site  Use a new gauze bandage each time you clean the area, moving away from the entry site  ¨ Gently pat the skin with a clean washcloth to dry it  · Apply the skin barrier and bandages  ¨ Roll up a bandage to make it thick, and place it under  the place where the tube enters the skin  Place it to support the tube, and stop it from kinking or bending  Tape the bandage in place, and apply more bandages if directed by a healthcare provider  ¨ Bring the tubing forward to the front and tape it to the skin  Do not stretch the tube tight, because this may pull the nephrostomy tube out  How often to change the bandage  Change the bandage around the tube, every other day  If your bandages  get dirty or wet, change them right away, and as often as needed  If your nephrostomy tube is to be used for a long period of time, the tube needs to be changed every 2 to 3 months  Healthcare providers will tell you when you need to make an appointment to have your tube changed  How to care for the urine drainage bag:   · Ask if you need to measure and write down how much urine is in the bag before you empty it  Drain urine out of the drainage bag when it is ½ to ? full  Open the spout at the bottom of the bag to empty the urine into the toilet  · You may need to detach the drainage bag from the nephrostomy tube to change it    If so, attach a new drainage bag tightly to the nephrostomy tube  ·   How to prevent problems with your nephrostomy tube:   · Change bandages, directed  This helps to prevent infection  Throw away or clean your drainage bag as directed by your healthcare provider  · Wipe the connecting ends of the drainage bag with alcohol before you reconnect the bag to the tube  This helps prevent infection  Keep the tube taped to your skin and connected to a drainage bag placed below the level of your kidneys  This helps prevent urine from backing up into your kidneys  You may wear a small drainage bag strapped to your leg to let you move around more easily  · Check the catheter to be sure it is in place after you change your clothes or do other activities  Do not wear tight clothing over the tube  Place the tubing over your thigh rather than under it when you are sitting down  Be sure that nothing is pulling on the nephrostomy tube when you move around  · Change positions if you see little or no urine in your drainage bag  Check to see if the urine tube is twisted or bent  Be sure that you are not sitting or lying on the tube  If there are no kinks and there is little or no urine in the drainage bag, tell your healthcare provider  · Flush out the tube as directed  Some tubes get flushed one time a day with 10 mls of NSS You will be given a prescription for the flushes  To flush the nephrostomy tube, clean both connections with alcohol swap  Twist off the drainage bag tube and twist the saline syringe into the nephrostomy tube and flush briskly  Remove the syringe and twist the drainage bag tube back into the nephrostomy tube  · Keep the site covered while you shower  Tape a piece of clear adhesive plastic over the dressing to keep it dry while you shower  Do not take tub baths      Contact Interventional Radiology at [phone] [facility] PATIENTS: Contact Interventional Radiology at [phone]) [facility] PATIENTS: Contact Interventional Radiology at [phone]) if:  · The skin around the nephrostomy tube is red, swollen, itches, or has a rash  · You have a fever greater than 101 or chills  · You have lower back or hip pain  · There are changes in how your urine looks or smells  · You have little or no urine draining from the nephrostomy tube  · You have nausea and are vomiting  · The black bernardo on your tube has moved, or the tube is longer than when it was put in    · You have questions or concerns about your condition or care  · The nephrostomy tube comes out completely  · There is blood, pus, or a bad smell coming from the place where the tube enters your skin  · Urine is leaking around the tube  The following pharmacies carry the flush syringes  [facility]                     [facility]       [address]  Phone [phone]            Phone [phone]  [address] & [facility]                      [address]                                 [phone]  Phone [phone]            Phone [phone]    [facility]                                                                         [facility] [phone]  [address]    [facility]   Phone [phone]

## 2021-04-13 ENCOUNTER — TELEPHONE (OUTPATIENT)
Dept: NUTRITION | Facility: CLINIC | Age: 66
End: 2021-04-13

## 2021-04-13 NOTE — TELEPHONE ENCOUNTER
Voice message received from pt requesting a call back because she has some questions  Called pt back who states she has been having sores in her mouth and was wondering what the mixture was that she can use to help with mouth sores  She also says she has been dry heaving often, especially after eating pasta  She had dry heaves after eating a hash brown this morning  She denies nausea  She was wondering if her dry heaves may be r/t her tx or hx RNY Gastric Bypass  She states that she is not eating small or frequent meals  Education provided on mouth sores and dry heaves  Discussed the use of bs/sw rinses and the recipe for same  Encouraged eating every 2-3 hrs, snacking on bland/room-temp/dry foods such as crackers, toast, and dry cereal (as long as they don't hurt her mouth), avoiding heavily seasoned/greasy/fried foods, chewing well, and eating slowly  I let her know that I will e-mail her more tips and food ideas  Pt was appreciative of advice  E-mailed to pt ([email or web address]'): [facility] Eating Hints Book, Sore Mouth handout, Nausea & Vomiting handout    Next follow up: 4/26 during infusion  All questions/concerns addressed at this time

## 2021-04-15 DIAGNOSIS — C54.1 ENDOMETRIAL CANCER (HCC): Primary | ICD-10-CM

## 2021-04-16 NOTE — PROGRESS NOTES
Outpatient Oncology Nutrition Consult  Type of Consult: Follow Up  Care Location: [facility]    Nutrition Assessment:  Oncology Diagnosis & Treatments:   Endometrial cancer  -S/p surgery 12/19/17    -Recurrence 7/8/19    -S/p chemotherapy (carboplatin/taxol from 7/30/19-1/6/20)  -S/p whole pelvis RT (2/4/20- 4/13/20)  -Disease progression    -ENDOBAR trial started 12/21/20  Oncology History   Endometrial cancer ([facility] [address] )   11/17/2017 Initial Diagnosis    Endometrial cancer ([facility] [address] )     11/17/2017 Biopsy    ENDOMETRIAL BIOPSY: WELL DIFFERENTIATED ENDOMETRIAL ADENOCARCINOMA (FIGO I) WITH FOCALMUCINOUS FEATURES  Part B: ENDOCERVICAL POLYP:BENIGN ENDOCERVICAL      12/19/2017 Surgery    Robotic assisted total laparoscopic hysterectomy with bilateral salpingo-oophorectomy and sentinel bilateral pelvic lymph node dissection  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 (4 4 x 3 2 cm tumor, 9 4/15 4mm invasion, NO LVSI, washings revealed atypical cellular changes)     12/19/2017 Genetic Testing    Morrison testing negative     6/28/2019 Biopsy    A  Breast, Right, US BX Right Breast 1000 4 cmfn:  - Benign breast tissue with focal histiocytic aggregate  See comment   - Negative for atypia and in-situ or invasive carcinoma  7/8/2019 Recurrence    Presented with right lower extremity DVT and CT demonstrating right pelvic sidewall mass with venous, ureteral and nerve compression causing significant neuropathic pain  Biopsy:  Lymph Node, Right pelvic lymph node x3:  - High-grade adenocarcinoma  7/30/2019 - 1/6/2020 Chemotherapy    Taxol 175 mg/m2 and carboplatin AUC 6 every 21 days  Dose was reduced to taxol 135 mg/m2 and carboplatin AUC 5  Completed 6 cycles  Treatment protracted due to multiple hospital admissions       2/24/2020 - 4/13/2020 Radiation    adjuvant external beam radiation therapy to the whole pelvis to 4500 cGy followed by boost to gross disease of an additional 2200 cGy 11/23/2020 Progression    New necrotic adenopathy in the retroperitoneum on CT      12/21/2020 -  Chemotherapy    Began chemotherapy with rucaparib, atezolizumab, and bevacizumab as per [facility] clinical trial        PMH:  RNYGB (2001 at [address])    Past Medical History:   Diagnosis Date    Anemia     Chemotherapy follow-up examination     Depression     Endometrial cancer ([address] ) 12/2017    Hyperglycemia     vx type 2 dm -- last assessed 4/1/14; resolved 11/7/17    Hyperlipidemia     Hypertension     Obesity     last assessed 10/14/17; resolved 11/7/17     Past Surgical History:   Procedure Laterality Date    ABDOMINAL SURGERY      GASTRIC BYPASS    CHOLECYSTECTOMY      at the time of gastric bypass    COLONOSCOPY      CT NEEDLE BIOPSY LYMPH NODE  7/8/2019    FL GUIDED CENTRAL VENOUS ACCESS DEVICE INSERTION  11/12/2019    GASTRIC BYPASS      HYSTERECTOMY Bilateral     total abdominal with salpingo-oophorectomy    IR NEPHROSTOMY TUBE PLACEMENT  7/26/2019    IR NEPHROURETERAL STENT CHECK/CHANGE/REPOSITION  4/6/2021    IR PICC LINE  9/27/2019    IR PORT PLACEMENT  7/26/2019    IR PORT REMOVAL  9/20/2019    OOPHORECTOMY Bilateral     OH INSJ TUNNELED CTR VAD W/SUBQ PORT AGE 5 YR/> Left 11/12/2019    Procedure: INSERTION VENOUS PORT ( PORT-A-CATH) IR;  Surgeon: Juana Gagnon DO;  [facility];  Service: Interventional Radiology    OH LAP, RADICAL HYST W/ TUBE&OV, NODE BX N/A 12/19/2017    Procedure: HYSTERECTOMY LAPAROSCOPIC TOTAL ([address]) W/ ROBOTICS; BILATERAL SALPINGOOPHERECTOMY; LYMPH NODE DISSECTION; lysis of adhesions;  Surgeon: Selina Aj MD;  Location: [facility];  Service: Gynecology Oncology    OH LAP,DIAGNOSTIC ABDOMEN N/A 12/19/2017    Procedure: LAPAROSCOPY DIAGNOSTIC;  Surgeon: Selina Aj MD;  [facility];  Service: Gynecology Oncology    TONSILLECTOMY      US GUIDED BREAST BIOPSY RIGHT COMPLETE Right 6/28/2019       Review of Medications: Vitamins, Supplements and Herbals: yes: Hx RNYGB regimen: Vitamin D, Folic Acid, Align, calcium citrate TID, B12 1000 mcg QD, MVI BID (MVI does not have iron);  Iron 65 mg QD (2 hrs seperate from calcium)    Current Outpatient Medications:     acetaminophen (TYLENOL) 325 mg tablet, Take 2 tablets (650 mg total) by mouth every 6 (six) hours as needed for mild pain, headaches or fever, Disp: 30 tablet, Rfl: 0    ARIPiprazole (ABILIFY) 10 mg tablet, Take 10 mg by mouth daily, Disp: , Rfl:     aspirin (ECOTRIN LOW STRENGTH) 81 mg EC tablet, Take 81 mg by mouth daily, Disp: , Rfl:     Calcium-Magnesium-Vitamin D (CALCIUM 500 PO), Take by mouth daily , Disp: , Rfl:     cholecalciferol (VITAMIN D3) 1,000 units tablet, Take 1,000 Units by mouth daily, Disp: , Rfl:     ciprofloxacin (CIPRO) 500 mg tablet, Take 1 tablet (500 mg total) by mouth every 12 (twelve) hours for 7 days, Disp: 14 tablet, Rfl: 0    Cyanocobalamin (VITAMIN B12 PO), Take by mouth daily , Disp: , Rfl:     enoxaparin (LOVENOX) 120 mg/0 8 mL, INJECT 0 8 ML (120 MG TOTAL) UNDER THE SKIN DAILY, Disp: 24 Syringe, Rfl: 3    folic acid (FOLVITE) 1 mg tablet, Take 1 tablet (1 mg total) by mouth daily, Disp: , Rfl: 0    gemfibrozil (LOPID) 600 mg tablet, TAKE 1 TABLET BY MOUTH EVERY DAY, Disp: 90 tablet, Rfl: 1    lactulose 20 g/30 mL, Take 45 mL (30 g total) by mouth 2 (two) times a day, Disp: 300 mL, Rfl: 1    LORazepam (ATIVAN) 0 5 mg tablet, Take 1 tablet (0 5 mg total) by mouth every 6 (six) hours as needed for anxiety (or nausea), Disp: 36 tablet, Rfl: 1    Multiple Vitamin (MULTIVITAMIN) tablet, Take 1 tablet by mouth daily, Disp: , Rfl:     Myrbetriq 50 MG TB24, TAKE 1 TABLET BY MOUTH EVERY DAY, Disp: 30 tablet, Rfl: 5    nitrofurantoin (MACROBID) 100 mg capsule, Take 1 capsule (100 mg total) by mouth 2 (two) times a day, Disp: 14 capsule, Rfl: 0    omeprazole (PriLOSEC) 20 mg delayed release capsule, Take 20 mg by mouth daily, Disp: , Rfl:     ondansetron (ZOFRAN) 8 mg tablet, Take 1 tablet (8 mg total) by mouth every 8 (eight) hours as needed for nausea or vomiting, Disp: 30 tablet, Rfl: 1    oxybutynin (DITROPAN XL) 15 MG 24 hr tablet, Take 1 tablet (15 mg total) by mouth daily at bedtime, Disp: 90 tablet, Rfl: 3    quinapril (ACCUPRIL) 5 mg tablet, TAKE 1 TABLET BY MOUTH EVERYDAY AT BEDTIME, Disp: 90 tablet, Rfl: 1    rucaparib (RUBRACA) 300 mg tablet, Take 600 mg by mouth every 12 (twelve) hours Take with or without food  Do not repeat a vomited dose , Disp: , Rfl:     saccharomyces boulardii (FLORASTOR) 250 mg capsule, Take 1 capsule (250 mg total) by mouth 2 (two) times a day, Disp: , Rfl: 0    Sodium Chloride Flush (Normal Saline Flush) 0 9 % SOLN, , Disp: , Rfl:     sodium chloride, PF, 0 9 %, 10 mL by Intracatheter route daily for 30 doses 10cc syringes, Disp: 300 mL, Rfl: 3    venlafaxine (EFFEXOR) 75 mg tablet, Take 75 mg by mouth 3 (three) times a day  , Disp: , Rfl:   No current facility-administered medications for this visit       Facility-Administered Medications Ordered in Other Visits:     INV atezolizumab (INVESTIGATIONAL) 1,200 mg in sodium chloride 0 9 % 250 mL infusion, 1,200 mg, Intravenous, Once, Danie Beauchamp MD    INV bevacizumab (INVESTIGATIONAL) 1,358 mg in sodium chloride 0 9 % 45 68 mL IVPB, 1,358 mg, Intravenous, Once, Danie Beauchamp MD    sodium chloride 0 9 % infusion, 20 mL/hr, Intravenous, Continuous, Danie Beauchamp MD    Most Recent Lab Results:  Lab Results   Component Value Date    WBC 7 72 04/23/2021    IRON 24 (L) 10/02/2019    TIBC 138 (L) 10/02/2019    FERRITIN 1,042 (H) 10/02/2019    CHOLESTEROL 190 04/23/2021    CHOL 183 10/27/2017    TRIG 88 11/01/2018    HDL 59 11/01/2018    LDLCALC 116 (H) 11/01/2018    ALT 48 04/23/2021    AST 22 04/23/2021    ALB 3 7 04/23/2021     10/27/2017     05/12/2017    SODIUM 137 04/23/2021    SODIUM 136 04/02/2021    K 4 3 04/23/2021    K 4 1 04/02/2021     (H) 04/23/2021    BUN 14 04/23/2021    BUN 17 04/02/2021    CREATININE 1 06 04/23/2021    CREATININE 1 07 04/02/2021    EGFR 56 04/23/2021    PHOS 3 9 04/23/2021    PHOS 4 0 04/02/2021    GLUCOSE 219 (H) 12/19/2017    POCGLU 97 08/18/2020    GLUF 105 (H) 04/02/2021    GLUF 108 (H) 03/12/2021    GLUC 107 04/23/2021    HGBA1C 5 1 02/03/2020    HGBA1C 6 2 08/28/2019    HGBA1C 5 5 11/01/2018    CALCIUM 9 9 04/23/2021    FOLATE 5 4 10/06/2019    MG 2 0 04/23/2021     Anthropometric Measurements:   Height: 59"  Ht Readings from Last 1 Encounters:   04/26/21 4' 11" (1 499 m)     Wt Readings from Last 20 Encounters:   04/26/21 81 9 kg (180 lb 8 oz)   04/23/21 82 6 kg (182 lb 1 6 oz)   04/05/21 84 1 kg (185 lb 8 oz)   04/02/21 84 1 kg (185 lb 8 oz)   03/15/21 84 8 kg (187 lb)   03/12/21 85 3 kg (188 lb)   02/22/21 86 2 kg (190 lb)   02/03/21 88 5 kg (195 lb)   01/29/21 88 5 kg (195 lb)   01/11/21 88 5 kg (195 lb)   01/08/21 89 4 kg (197 lb)   12/21/20 90 3 kg (199 lb)   12/18/20 90 5 kg (199 lb 8 oz)   12/04/20 88 9 kg (196 lb)   11/25/20 89 1 kg (196 lb 8 oz)   11/04/20 87 1 kg (192 lb)   10/14/20 86 4 kg (190 lb 6 4 oz)   08/13/20 85 7 kg (189 lb)   08/06/20 86 9 kg (191 lb 9 6 oz)   08/05/20 88 1 kg (194 lb 3 2 oz)     Weight Hx:  · Usual Weight: 270# before RNYGB in 2001; lowest post-op wt: 200#; wt typically fluctuates between 215-230#  · Varian: (2/25/20) 185 6#, (3/3/20) 188 6#, (3/10/20) 187 2#, (3/19/20) 186 2#, (3/24/20) 187#, (3/31/20) 185 4#, (4/10/20) 184  4#   · Home Scale Wts: (2/19/20) 185#, (8/3/20) 194#    Oncology Nutrition-Anthropometrics      Nutrition from 4/26/2021 in [facility] Nutrition from 3/15/2021 in [facility] Oncology Dietitian Services   Patient [age] (years):  63 years  63 years   Patient (female) height (in):  61 in  61 in   Current Weight to be used for anthropometric calculations (lbs)  180 5 lbs  187 lbs Current Weight to be used for anthropometric calculations (kg)  82 kg  85 kg   BMI:  36 5  37 8   IBW female:  95 lbs  95 lbs   IBW (kg) female:  43 2 kg  43 2 kg   IBW % (female)  190 %  196 8 %   Adjusted BW (female):  116 4 lbs  118 lbs   Adjusted BW kg (female):  52 9 kg  53 6 kg   % weight change after 1 month:  -3 5 %  -1 6 %   Weight change after 1 month (lbs)  -6 5 lbs  -3 lbs   % weight change after 3 months:  -7 4 %  -6 3 %   Weight change after 3 months (lbs)  -14 5 lbs  -12 5 lbs         Nutrition-Focused Physical Findings: none observed     Food/Nutrition-Related History & Client/Social History:    Current Nutrition Impact Symptoms:  [] Nausea  [x] Reduced Appetite - forcing self to eat, feels constipation is contributing [] Acid Reflux   [] Vomiting   dry heaves resolved since last discussion [x] Unintended Wt Loss - ongoing, d/t loss of appetite [] Malabsorption    [] Diarrhea  [] Unintended Wt Gain  [] Dumping Syndrome   [x] Constipation -  using Miralax daily, and now lactulose and mineral oil prn  -thinks this is contributing to reduced appetite [] Thick Mucous/Secretions  [] Abdominal Pain    [] Dysgeusia (Altered Taste) [x] Xerostomia (Dry Mouth) - using Biotene toothpaste and mouthwash  Uses dry mouth lozenges prn    Biotene has been effective  [] Gas    [] Dysosmia (Altered Smell)  [] Thrush  [] Difficulty Chewing    [x] Oral Mucositis (Sore Mouth) - having mouth sores off/on; currently under control  -using bs/sw rinses but not consistently [x] Fatigue  [] Other:    [] Odynophagia   [] Esophagitis  [] Other:    [] Dysphagia [] Early Satiety  [] No Problems Eating      Food Allergies & Intolerances: yes: had skin patch testing which showed allergy to strawberries, but says she is able to eat strawberries without any side effects    Current Diet: Regular Diet, No Restrictions and Small Frequent Meals   Current Nutrition Intake: Unchanged from last visit   Appetite: Poor - has been forcing self to eat  Nutrition Route: PO  Meal planning/preparation mainly done by: mother does cooking; sister does shopping  Activity level: Mainly sedentary  Plans to start walking outside  24 Hr Diet Recall:   Breakfast: 1 mini plain bagel with cream cheese, 1 cheese stick  Snack: 1 pepper with ranch dip  Lunch: 1 5 cup baked ziti with meat sauce  Snack: pretzels and peppers with ranch dip  Dinner: salami sandwich on 1 mini bagel  Snack: 1 hot dog on a slice of wheat bread, pepper    Beverages: water (16 9 oz x3), Butch' regular unsweetened tea (32 oz x1), unsweetened lemonade (8 oz x1)   -Does not drink coffee or alcohol      -Does not separate fluids from solids, does not have discomfort while eating and drinking at the same time  Supplements:    None       Oncology Nutrition-Estimated Needs      Nutrition from 2020 in [facility] Oncology Dietitian Services   Weight type used  Adjusted weight   Weight in kilograms (kg) used for estimated needs  54 1 kg   Energy needs formula:   25-30 kcal/kg   Energy needs based on 25 kcal/k kcal   Energy needs based on 30 kcal/k kcal   Protein needs formula:  1-1 2 g/kg   Protein needs based on 1 g/kg  54 g   Protein needs based on 1 2 g/k g   Fluid needs formula:  25-30 mL/kg   Fluid needs based on 25 mL/kg  1350 mL   Fluid needs in ounces  46 oz   Fluid needs based on 30 mL/kg  1620 mL   Fluid needs in ounces  55 oz        Discussion & Intervention:    Madelaine Boeck was evaluated today for an RD follow up regarding wt loss and pt request for dietitian involvement throughout tx  Madelaine Boeck is currently undergoing tx for endometrial cancer  She hast lost another ~6 5# over the past month which she attributes to an ongoing poor appetite d/t constipation  Her bowel meds were adjusted Friday for constipation which resulted in diarrhea and she noted an increase in her appetite afterwards    She says she is doing better with small, frequent meals and that she tries to eat every hour  Her dry heaves resolved  She has been having mouth sores on and off and using bs/sw rinses inconsistently  She is trying to eat more vegetables  She has not started the CIB shakes yet because she forgot but plans to try to incorporate these daily  Reviewed 24 hour recall, which revealed an inadequate po intake, and discussed ways to increase kcal, protein, and fluid intakes and optimize nutrient intake  Also reviewed the importance of wt management throughout the tx process and the role of a high kcal/ high protein diet and a cancer preventative diet in managing wt and overall health  Based on today's assessment, discussion included: MNT for: reduced appetite, wt loss, constipation  mouth sores, practicing proper oral care, a high kcal/protein diet & food choices to include at all meals & snacks (Examples of high kcal foods: cheese, full-fat dairy products, nut butter, plant-based fats, coconut oil/milk, avocado, butter, cream soup, etc  Examples of high protein foods: eggs, chicken, fish, beans/legumes, nuts/nut butters, bone broth, etc ) , fortifying foods for added kcal and protein (examples include: adding cheese to foods such as eggs, mashed potatoes, casseroles, etc ; Making oatmeal with whole milk rather than water; Making fortified mashed potatoes with cream, butter, dry milk powder, plain [place] yogurt, and cheese ), adequate hydration & fluid choices, sipping on calorie containing beverages (examples include: adding whole milk or cream to coffee, oral nutrition supplements, juice, electrolyte replacement beverages, milk, etc ), eating smaller more frequent meals every 2-3 hours (5-6 small meals/day), having consistent and planned snacks between meals, eating when feeling most hungry and starting oral nutrition supplements     Moving forward, Nicky Fallon plans to and was encouraged to eat 5-6 small meals per day (every 2-3 hrs), make sure there is a protein source at all eating occasions (cheese sticks, [place] yogurt, eggs, nut butter, nuts, shrimp), incorporate a higher protein dip for her pretzels and veggies, start 1 CIB shake as a morning snack, use bs/sw rinses more consistently for tx and prevention of mouth sores, and monitor her wt weekly at home  She plans to discuss her nutritional status with her family for additional support and reinforcement at home  Materials Provided: not applicable  All questions and concerns addressed during todays visit  Anita Mcintosh has RD contact information  Nutrition Diagnosis:  Inadequate Energy Intake related to physiological causes, disease state and treatment related issues as evidenced by food recall, wt loss and discussion with pt and/or family  Increased Nutrient Needs (kcal & pro) related to increased demand for nutrients and disease state as evidenced by cancer dx and pt undergoing tx for cancer  Altered GI Function related to alteration in GI tract motility as evidenced by Constipation  Behavorial/Environmental- Knowledge & Beliefs: Limited adherence to nutrition-related recommendations related to behavioral causes as evidenced by Per pt interview, has not acheived nutrition goals set over multiple sessions  Monitoring & Evaluation:   Goals:  · weight maintenance/stabilization  · adequate nutrition impact symptom management  · pt to meet >/=75% estimated nutrition needs daily   · Eat 5-6 small meals per day  · Start 1 oral nutrition supplement per day    · Progress Towards Goals: Progressing, Partially Met and New Goal(s) Added    Nutrition Rx & Recommendations:  · Small, frequent meals/snacks may be easier to tolerate than 3 large daily meals  Aim for 5-6 small meals per day (every 2-3 hours)  · Include protein at all meals/snacks    · Follow a Cancer Preventative Nutrition Pattern: colorful, plant-based, low-fat, avoid added sugars, limit alcohol, include high fiber foods, limit processed meats, limit red meat, choose lean protein sources, use low-fat cooking methods, balance calories with physical activity, avoid excessive weight gain throughout life  · Incorporate physical activity as able/allowed  · For appetite loss: try powdered or liquid nutrition supplements; eating by the clock every 2-3 hours; set a timer to remind yourself to eat, keep snacks nearby; add extra protein & calories to your diet; drink liquids in between meals, choose liquids that add calories; eat a bedtime snack; eat soft, cool or frozen foods; eat a larger meal when you feel well & rested; only sip small amounts of liquids during meals (see pages 10-12 in your Eating Hints book)  · For weight loss: monitor your weight at home at least 2x/week, record your weight, start by adding 250-500 extra calories per day, eat 5-6 small scheduled meals every 2-3 hrs, choose foods that are high in protein and calories (see pages 49-53 in your Eating Hints book), drink liquids with calories for example: milkshakes/smoothies/juice/soup/whole milk/chocolate milk, cook with protein fortified milk (see recipe on page 36 in your Eating Hints book), consider ready-to-drink oral nutrition supplements such as Ensure Plus, Ensure Enlive, Boost Plus, or Boost Very High Calorie, avoid "diet" and "light" foods when possible, avoid drinking too much with meals, contact your dietitian with any continued weight loss over the course of 1 week  For more info see pages 35-37 in your Eating Hints book  · For constipation: drink plenty of fluids (>64 oz/day); drink hot liquids; eat high-fiber foods as tolerated (whole grains, beans, peas, nuts, seeds, fruits, vegetables, etc); increase physical activity as tolerated  Avoid increasing fiber intake too quickly, add fiber into your diet slowly; keep a record of your bowel movements (see pages 13-14 in you Eating Hints book)  · Fluid Goal: 46-55 oz per day  Aim for at least 55 oz per day while having constipation    · Add 1 [place] Instant Breakfast shake made with 2% milk in the morning as a snack  · Add peanut butter to your pretzels for more calories and protein  · Try dipping veggies in humus for added protein  · Use baking soda/salt water rinses more consistently for mouth sores  · Start weighing yourself once a week at home  · Lifetime vitamin/mineral supplementation recommended for Gastric Bypass Surgery:  · Multivitamin BID  · Iron 45 mg daily  · Vitamin D daily  · Calcium Citrate 500 mg TID (do not take with iron, needs to be 2 hrs apart from iron)  · B12 1000 mcg daily    · Resources for recipes: [employer], cancerdietitian  com, [employer] org/support/nutrition-and-cancer, Skinnytaste  com, www oncologynutrition  org/erfc/recipes, www cookforBeVocal  org, The Essential Cancer Treatment Nutrition Guide and Cookbook: Includes 954 Healthy and Delicious Recipes      Follow Up Plan: 5/17/21 during infusion   Recommend Referral to Other Providers: none at this time

## 2021-04-20 DIAGNOSIS — C54.1 ENDOMETRIAL CANCER (HCC): Primary | ICD-10-CM

## 2021-04-23 ENCOUNTER — OFFICE VISIT (OUTPATIENT)
Dept: GYNECOLOGIC ONCOLOGY | Facility: CLINIC | Age: 66
End: 2021-04-23
Payer: COMMERCIAL

## 2021-04-23 ENCOUNTER — DOCUMENTATION (OUTPATIENT)
Dept: OTHER | Facility: HOSPITAL | Age: 66
End: 2021-04-23

## 2021-04-23 ENCOUNTER — HOSPITAL ENCOUNTER (OUTPATIENT)
Dept: INFUSION CENTER | Facility: HOSPITAL | Age: 66
Discharge: HOME | End: 2021-04-23
Payer: COMMERCIAL

## 2021-04-23 VITALS
HEART RATE: 82 BPM | DIASTOLIC BLOOD PRESSURE: 72 MMHG | WEIGHT: 182.1 LBS | BODY MASS INDEX: 36.71 KG/M2 | HEIGHT: 59 IN | SYSTOLIC BLOOD PRESSURE: 136 MMHG | TEMPERATURE: 98 F | RESPIRATION RATE: 18 BRPM

## 2021-04-23 VITALS — TEMPERATURE: 97.5 F

## 2021-04-23 DIAGNOSIS — R31.9 URINARY TRACT INFECTION WITH HEMATURIA, SITE UNSPECIFIED: ICD-10-CM

## 2021-04-23 DIAGNOSIS — R31.9 URINARY TRACT INFECTION WITH HEMATURIA, SITE UNSPECIFIED: Primary | ICD-10-CM

## 2021-04-23 DIAGNOSIS — N39.0 URINARY TRACT INFECTION WITH HEMATURIA, SITE UNSPECIFIED: ICD-10-CM

## 2021-04-23 DIAGNOSIS — K59.03 DRUG INDUCED CONSTIPATION: ICD-10-CM

## 2021-04-23 DIAGNOSIS — N39.0 URINARY TRACT INFECTION WITH HEMATURIA, SITE UNSPECIFIED: Primary | ICD-10-CM

## 2021-04-23 DIAGNOSIS — Z45.2 ENCOUNTER FOR CENTRAL LINE CARE: ICD-10-CM

## 2021-04-23 DIAGNOSIS — C54.1 ENDOMETRIAL CANCER (HCC): Primary | Chronic | ICD-10-CM

## 2021-04-23 DIAGNOSIS — C54.1 ENDOMETRIAL CANCER (HCC): Primary | ICD-10-CM

## 2021-04-23 LAB
ALBUMIN SERPL BCP-MCNC: 3.7 G/DL (ref 3.5–5)
ALP SERPL-CCNC: 166 U/L (ref 46–116)
ALT SERPL W P-5'-P-CCNC: 48 U/L (ref 12–78)
AMYLASE SERPL-CCNC: 31 IU/L (ref 25–115)
ANION GAP SERPL CALCULATED.3IONS-SCNC: 6 MMOL/L (ref 4–13)
APTT PPP: 52 SECONDS (ref 23–37)
AST SERPL W P-5'-P-CCNC: 22 U/L (ref 5–45)
BACTERIA UR QL AUTO: ABNORMAL /HPF
BASOPHILS # BLD AUTO: 0.05 THOUSANDS/ΜL (ref 0–0.1)
BASOPHILS NFR BLD AUTO: 1 % (ref 0–1)
BILIRUB SERPL-MCNC: 0.29 MG/DL (ref 0.2–1)
BILIRUB UR QL STRIP: ABNORMAL
BUN SERPL-MCNC: 14 MG/DL (ref 5–25)
CALCIUM SERPL-MCNC: 9.9 MG/DL (ref 8.3–10.1)
CHLORIDE SERPL-SCNC: 109 MMOL/L (ref 100–108)
CHOLEST SERPL-MCNC: 190 MG/DL (ref 50–200)
CLARITY UR: ABNORMAL
CO2 SERPL-SCNC: 22 MMOL/L (ref 21–32)
COLOR UR: ABNORMAL
CREAT SERPL-MCNC: 1.06 MG/DL (ref 0.6–1.3)
CREAT UR-MCNC: 198 MG/DL
EOSINOPHIL # BLD AUTO: 0.03 THOUSAND/ΜL (ref 0–0.61)
EOSINOPHIL NFR BLD AUTO: 0 % (ref 0–6)
ERYTHROCYTE [DISTWIDTH] IN BLOOD BY AUTOMATED COUNT: 14.7 % (ref 11.6–15.1)
GFR SERPL CREATININE-BSD FRML MDRD: 56 ML/MIN/1.73SQ M
GGT SERPL-CCNC: 10 U/L (ref 5–85)
GLUCOSE SERPL-MCNC: 107 MG/DL (ref 65–140)
GLUCOSE UR STRIP-MCNC: NEGATIVE MG/DL
HCT VFR BLD AUTO: 32.5 % (ref 34.8–46.1)
HGB BLD-MCNC: 10.3 G/DL (ref 11.5–15.4)
HGB UR QL STRIP.AUTO: ABNORMAL
IMM GRANULOCYTES # BLD AUTO: 0.05 THOUSAND/UL (ref 0–0.2)
IMM GRANULOCYTES NFR BLD AUTO: 1 % (ref 0–2)
INR PPP: 1.05 (ref 0.84–1.19)
KETONES UR STRIP-MCNC: ABNORMAL MG/DL
LEUKOCYTE ESTERASE UR QL STRIP: ABNORMAL
LIPASE SERPL-CCNC: 136 U/L (ref 73–393)
LYMPHOCYTES # BLD AUTO: 0.84 THOUSANDS/ΜL (ref 0.6–4.47)
LYMPHOCYTES NFR BLD AUTO: 11 % (ref 14–44)
MAGNESIUM SERPL-MCNC: 2 MG/DL (ref 1.6–2.6)
MCH RBC QN AUTO: 30.9 PG (ref 26.8–34.3)
MCHC RBC AUTO-ENTMCNC: 31.7 G/DL (ref 31.4–37.4)
MCV RBC AUTO: 98 FL (ref 82–98)
MONOCYTES # BLD AUTO: 0.9 THOUSAND/ΜL (ref 0.17–1.22)
MONOCYTES NFR BLD AUTO: 12 % (ref 4–12)
NEUTROPHILS # BLD AUTO: 5.85 THOUSANDS/ΜL (ref 1.85–7.62)
NEUTS SEG NFR BLD AUTO: 75 % (ref 43–75)
NITRITE UR QL STRIP: POSITIVE
NON-SQ EPI CELLS URNS QL MICRO: ABNORMAL /HPF
NRBC BLD AUTO-RTO: 0 /100 WBCS
PH UR STRIP.AUTO: 6.5 [PH]
PHOSPHATE SERPL-MCNC: 3.9 MG/DL (ref 2.3–4.1)
PLATELET # BLD AUTO: 269 THOUSANDS/UL (ref 149–390)
PMV BLD AUTO: 10.4 FL (ref 8.9–12.7)
POTASSIUM SERPL-SCNC: 4.3 MMOL/L (ref 3.5–5.3)
PROT SERPL-MCNC: 8.3 G/DL (ref 6.4–8.2)
PROT UR STRIP-MCNC: ABNORMAL MG/DL
PROT UR-MCNC: 388 MG/DL
PROT/CREAT UR: 1.96 MG/G{CREAT} (ref 0–0.1)
PROTHROMBIN TIME: 13.8 SECONDS (ref 11.6–14.5)
RBC # BLD AUTO: 3.33 MILLION/UL (ref 3.81–5.12)
RBC #/AREA URNS AUTO: ABNORMAL /HPF
SODIUM SERPL-SCNC: 137 MMOL/L (ref 136–145)
SP GR UR STRIP.AUTO: 1.02 (ref 1–1.03)
T3 SERPL-MCNC: 1.5 NG/ML (ref 0.6–1.8)
T4 FREE SERPL-MCNC: 1.18 NG/DL (ref 0.76–1.46)
TSH SERPL DL<=0.05 MIU/L-ACNC: 1.03 UIU/ML (ref 0.36–3.74)
UROBILINOGEN UR QL STRIP.AUTO: 1 E.U./DL
WBC # BLD AUTO: 7.72 THOUSAND/UL (ref 4.31–10.16)
WBC #/AREA URNS AUTO: ABNORMAL /HPF

## 2021-04-23 PROCEDURE — 82570 ASSAY OF URINE CREATININE: CPT

## 2021-04-23 PROCEDURE — 83690 ASSAY OF LIPASE: CPT

## 2021-04-23 PROCEDURE — 99214 OFFICE O/P EST MOD 30 MIN: CPT | Performed by: NURSE PRACTITIONER

## 2021-04-23 PROCEDURE — 83735 ASSAY OF MAGNESIUM: CPT

## 2021-04-23 PROCEDURE — 84480 ASSAY TRIIODOTHYRONINE (T3): CPT

## 2021-04-23 PROCEDURE — 85025 COMPLETE CBC W/AUTO DIFF WBC: CPT

## 2021-04-23 PROCEDURE — 82465 ASSAY BLD/SERUM CHOLESTEROL: CPT

## 2021-04-23 PROCEDURE — 80053 COMPREHEN METABOLIC PANEL: CPT

## 2021-04-23 PROCEDURE — 3008F BODY MASS INDEX DOCD: CPT | Performed by: NURSE PRACTITIONER

## 2021-04-23 PROCEDURE — 82977 ASSAY OF GGT: CPT

## 2021-04-23 PROCEDURE — 81001 URINALYSIS AUTO W/SCOPE: CPT

## 2021-04-23 PROCEDURE — 84439 ASSAY OF FREE THYROXINE: CPT

## 2021-04-23 PROCEDURE — 82150 ASSAY OF AMYLASE: CPT

## 2021-04-23 PROCEDURE — 85610 PROTHROMBIN TIME: CPT

## 2021-04-23 PROCEDURE — 84156 ASSAY OF PROTEIN URINE: CPT

## 2021-04-23 PROCEDURE — 85730 THROMBOPLASTIN TIME PARTIAL: CPT

## 2021-04-23 PROCEDURE — 84100 ASSAY OF PHOSPHORUS: CPT

## 2021-04-23 PROCEDURE — 1036F TOBACCO NON-USER: CPT | Performed by: NURSE PRACTITIONER

## 2021-04-23 PROCEDURE — 84443 ASSAY THYROID STIM HORMONE: CPT

## 2021-04-23 RX ORDER — LACTULOSE 20 G/30ML
SOLUTION ORAL
Qty: 300 ML | Refills: 1 | Status: SHIPPED | OUTPATIENT
Start: 2021-04-23 | End: 2021-05-24 | Stop reason: SDUPTHER

## 2021-04-23 RX ORDER — SODIUM CHLORIDE 9 MG/ML
20 INJECTION, SOLUTION INTRAVENOUS CONTINUOUS
Status: DISCONTINUED | OUTPATIENT
Start: 2021-04-26 | End: 2021-04-27 | Stop reason: HOSPADM

## 2021-04-23 RX ORDER — CIPROFLOXACIN 500 MG/1
500 TABLET, FILM COATED ORAL EVERY 12 HOURS SCHEDULED
Qty: 14 TABLET | Refills: 0 | Status: SHIPPED | OUTPATIENT
Start: 2021-04-23 | End: 2021-04-30

## 2021-04-23 NOTE — PROGRESS NOTES
Assessment/Plan:    Problem List Items Addressed This Visit        Digestive    Drug induced constipation     Patient had one small BM yesterday  Prior to that, it has been a few days since her last BM  She is taking miralax BID and mineral oil BID with little effect  We discussed trying lactulose in lieu of these and PRN to see if it is helpful  Genitourinary    Endometrial cancer ([facility] ) - Primary (Chronic)     63year-old with recurrent stage IB endometrial cancer who is s/p cycle 7 of treatment on the Endo[facility] trial  She has significant constipation that causes her increased abdominal discomfort  Her laboratory parameters are WNL  Urine shows UTI  Her performance status is 1  Patient will proceed with treatment on Monday  She will return to the office as per clinical trial scheduling with a pre-visit CT scan  Urinary tract infection with hematuria     + UTI on labs collected this morning  Will send Cipro  She has been on 2 courses of abx since March -- Macrobid and Bactrim  Negative culture on 4/5, however, now her urine is dark and turbid, with + nitrites and innumerable WBCs and RBCs  CHIEF COMPLAINT: pre-chemo evaluation while on clinical trial      Problem:  Cancer Staging  Endometrial cancer [facility])  Staging form: Corpus Uteri - Carcinoma, AJCC 7th Edition  - Clinical stage from 12/19/2017: FIGO Stage IB (T1b, N0, M0) - Signed by Marsmaritza Barboza MD on 2/12/2018        Previous therapy:  Oncology History   Endometrial cancer ([facility] )   11/17/2017 Initial Diagnosis    Endometrial cancer ([facility] )     11/17/2017 Biopsy    ENDOMETRIAL BIOPSY: WELL DIFFERENTIATED ENDOMETRIAL ADENOCARCINOMA (FIGO I) WITH FOCALMUCINOUS FEATURES  Part B: ENDOCERVICAL POLYP:BENIGN ENDOCERVICAL      12/19/2017 Surgery    Robotic assisted total laparoscopic hysterectomy with bilateral salpingo-oophorectomy and sentinel bilateral pelvic lymph node dissection   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 (4 4 x 3 2 cm tumor, 9 4/15 4mm invasion, NO LVSI, washings revealed atypical cellular changes)     12/19/2017 Genetic Testing    Morrison testing negative     6/28/2019 Biopsy    A  Breast, Right, US BX Right Breast 1000 4 cmfn:  - Benign breast tissue with focal histiocytic aggregate  See comment   - Negative for atypia and in-situ or invasive carcinoma  7/8/2019 Recurrence    Presented with right lower extremity DVT and CT demonstrating right pelvic sidewall mass with venous, ureteral and nerve compression causing significant neuropathic pain  Biopsy:  Lymph Node, Right pelvic lymph node x3:  - High-grade adenocarcinoma  7/30/2019 - 1/6/2020 Chemotherapy    Taxol 175 mg/m2 and carboplatin AUC 6 every 21 days  Dose was reduced to taxol 135 mg/m2 and carboplatin AUC 5  Completed 6 cycles  Treatment protracted due to multiple hospital admissions  2/24/2020 - 4/13/2020 Radiation    adjuvant external beam radiation therapy to the whole pelvis to 4500 cGy followed by boost to gross disease of an additional 2200 cGy     11/23/2020 Progression    New necrotic adenopathy in the retroperitoneum on CT      12/21/2020 -  Chemotherapy    Began chemotherapy with rucaparib, atezolizumab, and bevacizumab as per [facility] clinical trial            Patient ID: Fish Estrada is a 63 y o  female     Patient is quite miserable from her chronic constipation  Her current regimen has been ineffective recently  She has been afebrile  She has occasional nausea with dry heaving but no vomiting  Her appetite is appropriate  She is voiding without difficulty  She has an increase in abdominal pain at times related to constipation  The patient is without vaginal bleeding or discharge  She denies chemotherapy-induced neuropathy  She is ambulatory        The following portions of the patient's history were reviewed and updated as appropriate: allergies, current medications, past family history, past medical history, past social history, past surgical history and problem list     Review of Systems   Constitutional: Negative for activity change, appetite change, chills, fever and unexpected weight change  HENT: Negative for mouth sores  Eyes: Negative  Respiratory: Negative for cough, chest tightness, shortness of breath and wheezing  Cardiovascular: Negative for chest pain, palpitations and leg swelling  Gastrointestinal: Positive for abdominal pain and constipation  Negative for abdominal distention, anal bleeding, blood in stool, diarrhea, nausea and vomiting  Endocrine: Negative  Genitourinary: Negative for difficulty urinating, dysuria, flank pain, frequency, hematuria, pelvic pain, urgency, vaginal bleeding, vaginal discharge and vaginal pain  Musculoskeletal: Negative for arthralgias and myalgias  Skin: Negative for color change, pallor and rash  Neurological: Negative for dizziness, weakness, numbness and headaches  Hematological: Negative  Psychiatric/Behavioral: The patient is not nervous/anxious          Current Outpatient Medications   Medication Sig Dispense Refill    acetaminophen (TYLENOL) 325 mg tablet Take 2 tablets (650 mg total) by mouth every 6 (six) hours as needed for mild pain, headaches or fever 30 tablet 0    ARIPiprazole (ABILIFY) 10 mg tablet Take 10 mg by mouth daily      aspirin (ECOTRIN LOW STRENGTH) 81 mg EC tablet Take 81 mg by mouth daily      Calcium-Magnesium-Vitamin D (CALCIUM 500 PO) Take by mouth daily       cholecalciferol (VITAMIN D3) 1,000 units tablet Take 1,000 Units by mouth daily      Cyanocobalamin (VITAMIN B12 PO) Take by mouth daily       enoxaparin (LOVENOX) 120 mg/0 8 mL INJECT 0 8 ML (120 MG TOTAL) UNDER THE SKIN DAILY 24 Syringe 3    folic acid (FOLVITE) 1 mg tablet Take 1 tablet (1 mg total) by mouth daily  0    gemfibrozil (LOPID) 600 mg tablet TAKE 1 TABLET BY MOUTH EVERY DAY 90 tablet 1    LORazepam (ATIVAN) 0 5 mg tablet Take 1 tablet (0 5 mg total) by mouth every 6 (six) hours as needed for anxiety (or nausea) 36 tablet 1    Multiple Vitamin (MULTIVITAMIN) tablet Take 1 tablet by mouth daily      Myrbetriq 50 MG TB24 TAKE 1 TABLET BY MOUTH EVERY DAY 30 tablet 5    nitrofurantoin (MACROBID) 100 mg capsule Take 1 capsule (100 mg total) by mouth 2 (two) times a day 14 capsule 0    omeprazole (PriLOSEC) 20 mg delayed release capsule Take 20 mg by mouth daily      ondansetron (ZOFRAN) 8 mg tablet Take 1 tablet (8 mg total) by mouth every 8 (eight) hours as needed for nausea or vomiting 30 tablet 1    oxybutynin (DITROPAN XL) 15 MG 24 hr tablet Take 1 tablet (15 mg total) by mouth daily at bedtime 90 tablet 3    quinapril (ACCUPRIL) 5 mg tablet TAKE 1 TABLET BY MOUTH EVERYDAY AT BEDTIME 90 tablet 1    rucaparib (RUBRACA) 300 mg tablet Take 600 mg by mouth every 12 (twelve) hours Take with or without food  Do not repeat a vomited dose   saccharomyces boulardii (FLORASTOR) 250 mg capsule Take 1 capsule (250 mg total) by mouth 2 (two) times a day  0    Sodium Chloride Flush (Normal Saline Flush) 0 9 % SOLN       venlafaxine (EFFEXOR) 75 mg tablet Take 75 mg by mouth 3 (three) times a day        ciprofloxacin (CIPRO) 500 mg tablet Take 1 tablet (500 mg total) by mouth every 12 (twelve) hours for 7 days 14 tablet 0    lactulose 20 g/30 mL Take 45 mL (30 g total) by mouth 2 (two) times a day 300 mL 1    sodium chloride, PF, 0 9 % 10 mL by Intracatheter route daily for 30 doses 10cc syringes 300 mL 3     No current facility-administered medications for this visit  Objective:    Blood pressure 136/72, pulse 82, temperature 98 °F (36 7 °C), temperature source Temporal, resp  rate 18, height 4' 11" (1 499 m), weight 82 6 kg (182 lb 1 6 oz), not currently breastfeeding  Body mass index is 36 78 kg/m²  Body surface area is 1 77 meters squared  Physical Exam  Vitals signs reviewed     Constitutional:       General: She is not in acute distress  Appearance: Normal appearance  She is not ill-appearing  HENT:      Head: Normocephalic and atraumatic  Mouth/Throat:      Mouth: Mucous membranes are moist    Eyes:      General: No scleral icterus  Right eye: No discharge  Left eye: No discharge  Conjunctiva/sclera: Conjunctivae normal    Pulmonary:      Effort: Pulmonary effort is normal    Musculoskeletal:      Right lower leg: No edema  Left lower leg: No edema  Skin:     General: Skin is warm and dry  Coloration: Skin is not jaundiced  Findings: No rash  Neurological:      General: No focal deficit present  Mental Status: She is alert and oriented to person, place, and time  Cranial Nerves: No cranial nerve deficit  Sensory: No sensory deficit  Motor: No weakness  Gait: Gait normal    Psychiatric:         Mood and Affect: Mood normal          Behavior: Behavior normal          Thought Content:  Thought content normal          Judgment: Judgment normal          No results found for:   Lab Results   Component Value Date     10/27/2017    K 4 3 04/23/2021     (H) 04/23/2021    CO2 22 04/23/2021    BUN 14 04/23/2021    CREATININE 1 06 04/23/2021    GLUCOSE 219 (H) 12/19/2017    GLUF 105 (H) 04/02/2021    CALCIUM 9 9 04/23/2021    CORRECTEDCA 10 2 (H) 12/09/2019    AST 22 04/23/2021    ALT 48 04/23/2021    ALKPHOS 166 (H) 04/23/2021    PROT 6 9 10/27/2017    BILITOT 0 3 10/27/2017    EGFR 56 04/23/2021     Lab Results   Component Value Date    WBC 7 72 04/23/2021    HGB 10 3 (L) 04/23/2021    HCT 32 5 (L) 04/23/2021    MCV 98 04/23/2021     04/23/2021     Lab Results   Component Value Date    NEUTROABS 5 85 04/23/2021        Trend:  No results found for:

## 2021-04-23 NOTE — PLAN OF CARE
Problem: Potential for Falls  Goal: Patient will remain free of falls  Description: INTERVENTIONS:  - Assess patient frequently for physical needs  -  Identify cognitive and physical deficits and behaviors that affect risk of falls    -  [place] fall precautions as indicated by assessment   - Educate patient/family on patient safety including physical limitations  - Instruct patient to call for assistance with activity based on assessment  - Modify environment to reduce risk of injury  - Consider OT/PT consult to assist with strengthening/mobility  Outcome: Progressing

## 2021-04-23 NOTE — PROGRESS NOTES
Labs have been reviewed and signed by Dr Coralie Spatz on 4/23/21  Per protocol, patient is ok to proceed with Cycle 7 Day 1 treatment on 4/26/21

## 2021-04-23 NOTE — PROGRESS NOTES
Patient was seen in the office for her Cycle 7 Day 1 pre treatment office visit with Harsh Bullock  Patient AEs and Conmeds were reviewed  Patient denied changes to her conmeds, but reported that she is experiencing worsening constipation with infrequent bowel movements  Patient reported that she is feeling uncomfortable  Kenneth Aguiar placed an order for Lactulose to help patient with these symptoms  Patient also reported some dry heaving, but no vomiting  Future appointments were reviewed  Overall, patient states that she is feeling okay, and was told to call with any questions or concerns

## 2021-04-23 NOTE — ASSESSMENT & PLAN NOTE
+ UTI on labs collected this morning  Will send Cipro  She has been on 2 courses of abx since March -- Macrobid and Bactrim  Negative culture on 4/5, however, now her urine is dark and turbid, with + nitrites and innumerable WBCs and RBCs

## 2021-04-23 NOTE — ASSESSMENT & PLAN NOTE
61-year-old with recurrent stage IB endometrial cancer who is s/p cycle 7 of treatment on the [facility] trial  She has significant constipation that causes her increased abdominal discomfort  Her laboratory parameters are WNL  Urine shows UTI  Her performance status is 1  Patient will proceed with treatment on Monday  She will return to the office as per clinical trial scheduling with a pre-visit CT scan

## 2021-04-23 NOTE — ASSESSMENT & PLAN NOTE
Patient had one small BM yesterday  Prior to that, it has been a few days since her last BM  She is taking miralax BID and mineral oil BID with little effect  We discussed trying lactulose in lieu of these and PRN to see if it is helpful

## 2021-04-26 ENCOUNTER — NUTRITION (OUTPATIENT)
Dept: NUTRITION | Facility: CLINIC | Age: 66
End: 2021-04-26

## 2021-04-26 ENCOUNTER — HOSPITAL ENCOUNTER (OUTPATIENT)
Dept: INFUSION CENTER | Facility: CLINIC | Age: 66
Discharge: HOME | End: 2021-04-26
Payer: COMMERCIAL

## 2021-04-26 ENCOUNTER — DOCUMENTATION (OUTPATIENT)
Dept: OTHER | Facility: HOSPITAL | Age: 66
End: 2021-04-26

## 2021-04-26 VITALS
TEMPERATURE: 97.9 F | DIASTOLIC BLOOD PRESSURE: 68 MMHG | HEART RATE: 62 BPM | HEIGHT: 59 IN | RESPIRATION RATE: 16 BRPM | WEIGHT: 180.5 LBS | OXYGEN SATURATION: 99 % | BODY MASS INDEX: 36.39 KG/M2 | SYSTOLIC BLOOD PRESSURE: 122 MMHG

## 2021-04-26 DIAGNOSIS — Z71.3 NUTRITIONAL COUNSELING: Primary | ICD-10-CM

## 2021-04-26 PROCEDURE — 96413 CHEMO IV INFUSION 1 HR: CPT

## 2021-04-26 PROCEDURE — 96417 CHEMO IV INFUS EACH ADDL SEQ: CPT

## 2021-04-26 PROCEDURE — J9999Q0 INV ATEZOLIZUMAB 1200MG/20ML 20 ML: Performed by: OBSTETRICS & GYNECOLOGY

## 2021-04-26 PROCEDURE — J9035Q0 INV BEVACIZUMAB 400MG/16ML 16 ML: Performed by: OBSTETRICS & GYNECOLOGY

## 2021-04-26 RX ADMIN — Medication 1200 MG: at 09:53

## 2021-04-26 RX ADMIN — Medication 1358 MG: at 10:33

## 2021-04-26 RX ADMIN — SODIUM CHLORIDE 20 ML/HR: 0.9 INJECTION, SOLUTION INTRAVENOUS at 09:00

## 2021-04-26 NOTE — PATIENT INSTRUCTIONS
Nutrition Rx & Recommendations:  · Small, frequent meals/snacks may be easier to tolerate than 3 large daily meals  Aim for 5-6 small meals per day (every 2-3 hours)  · Include protein at all meals/snacks  · Follow a Cancer Preventative Nutrition Pattern: colorful, plant-based, low-fat, avoid added sugars, limit alcohol, include high fiber foods, limit processed meats, limit red meat, choose lean protein sources, use low-fat cooking methods, balance calories with physical activity, avoid excessive weight gain throughout life  · Incorporate physical activity as able/allowed  · For appetite loss: try powdered or liquid nutrition supplements; eating by the clock every 2-3 hours; set a timer to remind yourself to eat, keep snacks nearby; add extra protein & calories to your diet; drink liquids in between meals, choose liquids that add calories; eat a bedtime snack; eat soft, cool or frozen foods; eat a larger meal when you feel well & rested; only sip small amounts of liquids during meals (see pages 10-12 in your Eating Hints book)  · For weight loss: monitor your weight at home at least 2x/week, record your weight, start by adding 250-500 extra calories per day, eat 5-6 small scheduled meals every 2-3 hrs, choose foods that are high in protein and calories (see pages 49-53 in your Eating Hints book), drink liquids with calories for example: milkshakes/smoothies/juice/soup/whole milk/chocolate milk, cook with protein fortified milk (see recipe on page 36 in your Eating Hints book), consider ready-to-drink oral nutrition supplements such as Ensure Plus, Ensure Enlive, Boost Plus, or Boost Very High Calorie, avoid "diet" and "light" foods when possible, avoid drinking too much with meals, contact your dietitian with any continued weight loss over the course of 1 week  For more info see pages 35-37 in your Eating Hints book    · For constipation: drink plenty of fluids (>64 oz/day); drink hot liquids; eat high-fiber foods as tolerated (whole grains, beans, peas, nuts, seeds, fruits, vegetables, etc); increase physical activity as tolerated  Avoid increasing fiber intake too quickly, add fiber into your diet slowly; keep a record of your bowel movements (see pages 13-14 in you Eating Hints book)  · Fluid Goal: 46-55 oz per day  Aim for at least 55 oz per day while having constipation  · Add 1 [employer] Instant Breakfast shake made with 2% milk in the morning as a snack  · Add peanut butter to your pretzels for more calories and protein  · Try dipping veggies in humus for added protein  · Use baking soda/salt water rinses more consistently for mouth sores  · Start weighing yourself once a week at home  · Lifetime vitamin/mineral supplementation recommended for Gastric Bypass Surgery:  · Multivitamin BID  · Iron 45 mg daily  · Vitamin D daily  · Calcium Citrate 500 mg TID (do not take with iron, needs to be 2 hrs apart from iron)  · B12 1000 mcg daily    · Resources for recipes: [employer], cancerdietitian  com, oncolink org/support/nutrition-and-cancer, SkinOpposing Views, www oncologynutrition  org/erfc/recipes, www cookforyourlife  org, The Essential Cancer Treatment Nutrition Guide and Cookbook: Includes 610 Healthy and Delicious Recipes      Follow Up Plan: 5/17/21 during infusion   Recommend Referral to Other Providers: none at this time

## 2021-04-26 NOTE — PLAN OF CARE
Problem: Potential for Falls  Goal: Patient will remain free of falls  Description: INTERVENTIONS:  - Assess patient frequently for physical needs  -  Identify cognitive and physical deficits and behaviors that affect risk of falls  -  [place] fall precautions as indicated by assessment   - Educate patient/family on patient safety including physical limitations  - Instruct patient to call for assistance with activity based on assessment  - Modify environment to reduce risk of injury  - Consider OT/PT consult to assist with strengthening/mobility  Outcome: Progressing     Problem: Knowledge Deficit  Goal: Patient/family/caregiver demonstrates understanding of disease process, treatment plan, medications, and discharge instructions  Description: Complete learning assessment and assess knowledge base    Interventions:  - Provide teaching at level of understanding  - Provide teaching via preferred learning methods  Outcome: Progressing

## 2021-04-26 NOTE — PROGRESS NOTES
Patient was seen in [address] [place] [address] for Cycle 7 Day 1 treatment  Patient was given Cycle 7 Rucaparib pills and drug diary, and patient returned Cycle 6 study pills and drug diary  Patient denied any changes to Conmeds and AEs  Patient was told to call with any questions or concerns, and was introduced to new Lead Coordinator, Floridalma Kumar RN   Patient tolerated treatment without incident today

## 2021-04-28 ENCOUNTER — TELEPHONE (OUTPATIENT)
Dept: OTHER | Facility: OTHER | Age: 66
End: 2021-04-28

## 2021-04-28 ENCOUNTER — TRANSCRIBE ORDERS (OUTPATIENT)
Dept: RADIOLOGY | Facility: HOSPITAL | Age: 66
End: 2021-04-28

## 2021-04-28 ENCOUNTER — HOSPITAL ENCOUNTER (OUTPATIENT)
Dept: RADIOLOGY | Facility: HOSPITAL | Age: 66
Discharge: HOME | End: 2021-04-28
Attending: OBSTETRICS & GYNECOLOGY
Payer: COMMERCIAL

## 2021-04-28 DIAGNOSIS — C54.1 ENDOMETRIAL CANCER (HCC): ICD-10-CM

## 2021-04-28 PROCEDURE — G1004 CDSM NDSC: HCPCS

## 2021-04-28 PROCEDURE — 74177 CT ABD & PELVIS W/CONTRAST: CPT

## 2021-04-28 PROCEDURE — 71260 CT THORAX DX C+: CPT

## 2021-04-28 RX ADMIN — IOHEXOL 100 ML: 350 INJECTION, SOLUTION INTRAVENOUS at 09:41

## 2021-04-28 NOTE — TELEPHONE ENCOUNTER
[phone] Pt  Odilon Faith  59 Patient call saying that she had a cat-scan today and where they access the blood vessel i am still bleeding from there on and off from 10:00 this morning  Right now i have ice on it and elevating it

## 2021-05-07 NOTE — PROGRESS NOTES
Outpatient Oncology Nutrition Consult  Type of Consult: Follow Up  Care Location: [facility]    Nutrition Assessment:  Oncology Diagnosis & Treatments:   Endometrial cancer  -S/p surgery 12/19/17    -Recurrence 7/8/19    -S/p chemotherapy (carboplatin/taxol from 7/30/19-1/6/20)  -S/p whole pelvis RT (2/4/20- 4/13/20)  -Disease progression    -ENDOBAR trial started 12/21/20  Oncology History   Endometrial cancer ([facility] [address] )   11/17/2017 Initial Diagnosis    Endometrial cancer ([facility] [address] )     11/17/2017 Biopsy    ENDOMETRIAL BIOPSY: WELL DIFFERENTIATED ENDOMETRIAL ADENOCARCINOMA (FIGO I) WITH FOCALMUCINOUS FEATURES  Part B: ENDOCERVICAL POLYP:BENIGN ENDOCERVICAL      12/19/2017 Surgery    Robotic assisted total laparoscopic hysterectomy with bilateral salpingo-oophorectomy and sentinel bilateral pelvic lymph node dissection  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 (4 4 x 3 2 cm tumor, 9 4/15 4mm invasion, NO LVSI, washings revealed atypical cellular changes)     12/19/2017 Genetic Testing    Morrison testing negative     6/28/2019 Biopsy    A  Breast, Right, US BX Right Breast 1000 4 cmfn:  - Benign breast tissue with focal histiocytic aggregate  See comment   - Negative for atypia and in-situ or invasive carcinoma  7/8/2019 Recurrence    Presented with right lower extremity DVT and CT demonstrating right pelvic sidewall mass with venous, ureteral and nerve compression causing significant neuropathic pain  Biopsy:  Lymph Node, Right pelvic lymph node x3:  - High-grade adenocarcinoma  7/30/2019 - 1/6/2020 Chemotherapy    Taxol 175 mg/m2 and carboplatin AUC 6 every 21 days  Dose was reduced to taxol 135 mg/m2 and carboplatin AUC 5  Completed 6 cycles  Treatment protracted due to multiple hospital admissions       2/24/2020 - 4/13/2020 Radiation    adjuvant external beam radiation therapy to the whole pelvis to 4500 cGy followed by boost to gross disease of an additional 2200 cGy 11/23/2020 Progression    New necrotic adenopathy in the retroperitoneum on CT      12/21/2020 -  Chemotherapy    Began chemotherapy with rucaparib, atezolizumab, and bevacizumab as per [facility] clinical trial        PMH:  RNYGB (2001 at [address])    Past Medical History:   Diagnosis Date    Anemia     Chemotherapy follow-up examination     Depression     Endometrial cancer ([address] ) 12/2017    Hyperglycemia     vx type 2 dm -- last assessed 4/1/14; resolved 11/7/17    Hyperlipidemia     Hypertension     Obesity     last assessed 10/14/17; resolved 11/7/17     Past Surgical History:   Procedure Laterality Date    ABDOMINAL SURGERY      GASTRIC BYPASS    CHOLECYSTECTOMY      at the time of gastric bypass    COLONOSCOPY      CT NEEDLE BIOPSY LYMPH NODE  7/8/2019    FL GUIDED CENTRAL VENOUS ACCESS DEVICE INSERTION  11/12/2019    GASTRIC BYPASS      HYSTERECTOMY Bilateral     total abdominal with salpingo-oophorectomy    IR NEPHROSTOMY TUBE PLACEMENT  7/26/2019    IR NEPHROURETERAL STENT CHECK/CHANGE/REPOSITION  4/6/2021    IR PICC LINE  9/27/2019    IR PORT PLACEMENT  7/26/2019    IR PORT REMOVAL  9/20/2019    OOPHORECTOMY Bilateral     ND INSJ TUNNELED CTR VAD W/SUBQ PORT AGE 5 YR/> Left 11/12/2019    Procedure: INSERTION VENOUS PORT ( PORT-A-CATH) IR;  Surgeon: Miriam Tarango DO;  [facility];  Service: Interventional Radiology    ND LAP, RADICAL HYST W/ TUBE&OV, NODE BX N/A 12/19/2017    Procedure: HYSTERECTOMY LAPAROSCOPIC TOTAL ([address]) W/ ROBOTICS; BILATERAL SALPINGOOPHERECTOMY; LYMPH NODE DISSECTION; lysis of adhesions;  Surgeon: Virgil Dias MD;  Location: [facility];  Service: Gynecology Oncology    ND LAP,DIAGNOSTIC ABDOMEN N/A 12/19/2017    Procedure: LAPAROSCOPY DIAGNOSTIC;  Surgeon: Virgil Dias MD;  [facility];  Service: Gynecology Oncology    TONSILLECTOMY      US GUIDED BREAST BIOPSY RIGHT COMPLETE Right 6/28/2019       Review of Medications: Vitamins, Supplements and Herbals: yes: Hx RNYGB regimen: Vitamin D, Folic Acid, Align, calcium citrate TID, B12 1000 mcg QD, MVI BID (MVI does not have iron);  Iron 65 mg QD (2 hrs seperate from calcium)    Current Outpatient Medications:     acetaminophen (TYLENOL) 325 mg tablet, Take 2 tablets (650 mg total) by mouth every 6 (six) hours as needed for mild pain, headaches or fever, Disp: 30 tablet, Rfl: 0    acetaminophen-codeine (TYLENOL #3) 300-30 mg per tablet, Take 1 tablet by mouth every 6 (six) hours as needed for moderate pain, Disp: 20 tablet, Rfl: 0    ARIPiprazole (ABILIFY) 10 mg tablet, Take 10 mg by mouth daily, Disp: , Rfl:     aspirin (ECOTRIN LOW STRENGTH) 81 mg EC tablet, Take 81 mg by mouth daily, Disp: , Rfl:     Calcium-Magnesium-Vitamin D (CALCIUM 500 PO), Take by mouth daily , Disp: , Rfl:     cholecalciferol (VITAMIN D3) 1,000 units tablet, Take 1,000 Units by mouth daily, Disp: , Rfl:     Cyanocobalamin (VITAMIN B12 PO), Take by mouth daily , Disp: , Rfl:     enoxaparin (LOVENOX) 120 mg/0 8 mL, INJECT 0 8 ML (120 MG TOTAL) UNDER THE SKIN DAILY, Disp: 24 Syringe, Rfl: 3    folic acid (FOLVITE) 1 mg tablet, Take 1 tablet (1 mg total) by mouth daily, Disp: , Rfl: 0    gemfibrozil (LOPID) 600 mg tablet, TAKE 1 TABLET BY MOUTH EVERY DAY, Disp: 90 tablet, Rfl: 1    lactulose 20 g/30 mL, Take 45 mL (30 g total) by mouth 2 (two) times a day, Disp: 300 mL, Rfl: 1    LORazepam (ATIVAN) 0 5 mg tablet, Take 1 tablet (0 5 mg total) by mouth every 6 (six) hours as needed for anxiety (or nausea), Disp: 36 tablet, Rfl: 1    Multiple Vitamin (MULTIVITAMIN) tablet, Take 1 tablet by mouth daily, Disp: , Rfl:     Myrbetriq 50 MG TB24, TAKE 1 TABLET BY MOUTH EVERY DAY, Disp: 30 tablet, Rfl: 5    nitrofurantoin (MACROBID) 100 mg capsule, Take 1 capsule (100 mg total) by mouth 2 (two) times a day, Disp: 14 capsule, Rfl: 0    omeprazole (PriLOSEC) 20 mg delayed release capsule, Take 20 mg by mouth daily, Disp: , Rfl:     ondansetron (ZOFRAN) 8 mg tablet, Take 1 tablet (8 mg total) by mouth every 8 (eight) hours as needed for nausea or vomiting, Disp: 30 tablet, Rfl: 1    oxybutynin (DITROPAN XL) 15 MG 24 hr tablet, Take 1 tablet (15 mg total) by mouth daily at bedtime, Disp: 90 tablet, Rfl: 3    quinapril (ACCUPRIL) 5 mg tablet, TAKE 1 TABLET BY MOUTH EVERYDAY AT BEDTIME, Disp: 90 tablet, Rfl: 1    rucaparib (RUBRACA) 300 mg tablet, Take 600 mg by mouth every 12 (twelve) hours Take with or without food  Do not repeat a vomited dose , Disp: , Rfl:     saccharomyces boulardii (FLORASTOR) 250 mg capsule, Take 1 capsule (250 mg total) by mouth 2 (two) times a day, Disp: , Rfl: 0    Sodium Chloride Flush (Normal Saline Flush) 0 9 % SOLN, , Disp: , Rfl:     sodium chloride, PF, 0 9 %, 10 mL by Intracatheter route daily for 30 doses 10cc syringes, Disp: 300 mL, Rfl: 3    venlafaxine (EFFEXOR) 75 mg tablet, Take 75 mg by mouth 3 (three) times a day  , Disp: , Rfl:   No current facility-administered medications for this visit       Facility-Administered Medications Ordered in Other Visits:     INV atezolizumab (INVESTIGATIONAL) 1,200 mg in sodium chloride 0 9 % 250 mL infusion, 1,200 mg, Intravenous, Once, Alesia Hickman MD    INV bevacizumab (INVESTIGATIONAL) 1,358 mg in sodium chloride 0 9 % 45 68 mL IVPB, 1,358 mg, Intravenous, Once, Alesia Hickman MD    sodium chloride 0 9 % infusion, 20 mL/hr, Intravenous, Continuous, Alesia Hickman MD    Most Recent Lab Results:  Lab Results   Component Value Date    WBC 7 80 05/14/2021    IRON 24 (L) 10/02/2019    TIBC 138 (L) 10/02/2019    FERRITIN 1,042 (H) 10/02/2019    CHOLESTEROL 182 05/14/2021    CHOL 183 10/27/2017    TRIG 88 11/01/2018    HDL 59 11/01/2018    LDLCALC 116 (H) 11/01/2018    ALT 29 05/14/2021    AST 16 05/14/2021    ALB 3 5 05/14/2021     10/27/2017     05/12/2017    SODIUM 139 05/14/2021    SODIUM 137 04/23/2021    K 4 0 05/14/2021 K 4 3 04/23/2021     (H) 05/14/2021    BUN 16 05/14/2021    BUN 14 04/23/2021    CREATININE 1 04 05/14/2021    CREATININE 1 06 04/23/2021    EGFR 57 05/14/2021    PHOS 3 6 05/14/2021    PHOS 3 9 04/23/2021    GLUCOSE 219 (H) 12/19/2017    POCGLU 97 08/18/2020    GLUF 105 (H) 04/02/2021    GLUF 108 (H) 03/12/2021    GLUC 66 05/14/2021    HGBA1C 5 1 02/03/2020    HGBA1C 6 2 08/28/2019    HGBA1C 5 5 11/01/2018    CALCIUM 10 0 05/14/2021    FOLATE 5 4 10/06/2019    MG 2 1 05/14/2021     Anthropometric Measurements:   Height: 59"  Ht Readings from Last 1 Encounters:   05/17/21 4' 11" (1 499 m)     Wt Readings from Last 20 Encounters:   05/17/21 80 7 kg (178 lb)   05/14/21 81 6 kg (180 lb)   04/26/21 81 9 kg (180 lb 8 oz)   04/23/21 82 6 kg (182 lb 1 6 oz)   04/05/21 84 1 kg (185 lb 8 oz)   04/02/21 84 1 kg (185 lb 8 oz)   03/15/21 84 8 kg (187 lb)   03/12/21 85 3 kg (188 lb)   02/22/21 86 2 kg (190 lb)   02/03/21 88 5 kg (195 lb)   01/29/21 88 5 kg (195 lb)   01/11/21 88 5 kg (195 lb)   01/08/21 89 4 kg (197 lb)   12/21/20 90 3 kg (199 lb)   12/18/20 90 5 kg (199 lb 8 oz)   12/04/20 88 9 kg (196 lb)   11/25/20 89 1 kg (196 lb 8 oz)   11/04/20 87 1 kg (192 lb)   10/14/20 86 4 kg (190 lb 6 4 oz)   08/13/20 85 7 kg (189 lb)     Weight Hx:  · Usual Weight: 270# before RNYGB in 2001; lowest post-op wt: 200#; wt typically fluctuates between 215-230#  · Varian: (2/25/20) 185 6#, (3/3/20) 188 6#, (3/10/20) 187 2#, (3/19/20) 186 2#, (3/24/20) 187#, (3/31/20) 185 4#, (4/10/20) 184  4#   · Home Scale Wts: (2/19/20) 185#, (8/3/20) 194#    Oncology Nutrition-Anthropometrics      Nutrition from 5/17/2021 in [facility] Nutrition from 4/26/2021 in [facility] Oncology Dietitian Services   Patient age (years):  63 years  63 years   Patient (female) height (in):  61 in  61 in   Current Weight to be used for anthropometric calculations (lbs)  178 lbs  180 5 lbs   Current Weight to be used for anthropometric calculations (kg)  80 9 kg  82 kg   BMI:  35 9  36 5   IBW female:  95 lbs  95 lbs   IBW (kg) female:  43 2 kg  43 2 kg   IBW % (female)  187 4 %  190 %   Adjusted BW (female):  115 8 lbs  116 4 lbs   Adjusted BW kg (female):  52 6 kg  52 9 kg   % weight change after 1 month:  -2 3 %  -3 5 %   Weight change after 1 month (lbs)  -4 1 lbs  -6 5 lbs   % weight change after 3 months:  -6 3 %  -7 4 %   Weight change after 3 months (lbs)  -12 lbs  -14 5 lbs   % weight change after 6 months:  -9 4 %  --   Weight change after 6 months (lbs)  -18 5 lbs  --         Nutrition-Focused Physical Findings: none observed     Food/Nutrition-Related History & Client/Social History:    Current Nutrition Impact Symptoms:  [] Nausea  [x] Reduced Appetite - improving, feels constipation is contributing [] Acid Reflux   [] Vomiting  [x] Unintended Wt Loss - ongoing, d/t loss of appetite [] Malabsorption    [] Diarrhea  [] Unintended Wt Gain  [] Dumping Syndrome   [x] Constipation -  using Miralax daily and lactulose every other day  -slightly improved since last visit  +BM over the weekend  -thinks this is contributing to reduced appetite [] Thick Mucous/Secretions  [] Abdominal Pain    [] Dysgeusia (Altered Taste) [x] Xerostomia (Dry Mouth) - using Biotene toothpaste and mouthwash  Uses dry mouth lozenges prn  Biotene has been effective  [] Gas    [] Dysosmia (Altered Smell)  [] Dodson Austin  [] Difficulty Chewing    [] Oral Mucositis (Sore Mouth)  [] Fatigue  [] Other:    [] Odynophagia   [] Esophagitis  [] Other:    [] Dysphagia [] Early Satiety  [] No Problems Eating      Food Allergies & Intolerances: yes: had skin patch testing which showed allergy to strawberries, but says she is able to eat strawberries without any side effects    Current Diet: Regular Diet, No Restrictions and Small Frequent Meals   Current Nutrition Intake:  Increased since last visit   Appetite: Fair , Forcing self to eat at times   Nutrition Route: PO  Meal planning/preparation mainly done by: mother does cooking; sister does shopping  Activity level: Mainly sedentary  Plans to start walking outside  24 Hr Diet Recall: finds that she eats better as the day goes on, tends to feel better overall when grazes throughout the day; has been struggling to eat vegetables  Breakfast: (10am) de la garza, egg, cheese, croissant   Snack: CIB shake  Lunch: bowl of [employer] chicken noodle soup (did not finish broth)  Snack: watermelon or fresh fruit  Dinner: 5-6 fried ravioli  Snack: leftover broth from [employer] soup    Beverages: water (16 9 oz x3), Butch' regular unsweetened tea (32 oz x1), unsweetened lemonade (occasionally), CIB (8 oz x1)   -Does not drink coffee or alcohol      -Does not separate fluids from solids, does not have discomfort while eating and drinking at the same time  Supplements:   [employer] Essentials (5rar=463 kcal, 5 g pro) mixed with 2% milk - 1 per day (prefers chocolate)       Oncology Nutrition-Estimated Needs      Nutrition from 2020 in [facility] Oncology Dietitian Services   Weight type used  Adjusted weight   Weight in kilograms (kg) used for estimated needs  54 1 kg   Energy needs formula:   25-30 kcal/kg   Energy needs based on 25 kcal/k kcal   Energy needs based on 30 kcal/k kcal   Protein needs formula:  1-1 2 g/kg   Protein needs based on 1 g/kg  54 g   Protein needs based on 1 2 g/k g   Fluid needs formula:  25-30 mL/kg   Fluid needs based on 25 mL/kg  1350 mL   Fluid needs in ounces  46 oz   Fluid needs based on 30 mL/kg  1620 mL   Fluid needs in ounces  55 oz        Discussion & Intervention:    Carolina Bernabe was evaluated today for an RD follow up regarding wt loss and pt request for dietitian involvement throughout tx  Carolina Bernabe is currently undergoing tx for endometrial cancer  She continues to lose wt despite an increased po intake    She had been maintaining her wt for couple weeks until this past weekend when she had a BM  She says that her dry heaves and mouth sores have resolved  Her fatigue and constipation have improved since last visit  She has been eating breakfast later in the morning and finds that she feels better overall when she grazes throughout the day  She has started a CIB shake daily per plan  She continues to struggle to eat more vegetables but has been trying to eat her fruit  Reviewed 24 hour recall, which revealed an inadequate po intake, and discussed ways to increase kcal, protein, and fluid intakes and optimize nutrient intake  Also reviewed the importance of wt management throughout the tx process and the role of a high kcal/ high protein diet and a cancer preventative diet in managing wt and overall health    Based on today's assessment, discussion included: MNT for: reduced appetite, wt loss, constipation, a high kcal/protein diet & food choices to include at all meals & snacks (Examples of high kcal foods: cheese, full-fat dairy products, nut butter, plant-based fats, coconut oil/milk, avocado, butter, cream soup, etc  Examples of high protein foods: eggs, chicken, fish, beans/legumes, nuts/nut butters, bone broth, etc ) , a post RNY Gastric Bypass diet & food choices to include at all meals & snacks, fortifying foods for added kcal and protein (examples include: adding cheese to foods such as eggs, mashed potatoes, casseroles, etc ; Making oatmeal with whole milk rather than water; Making fortified mashed potatoes with cream, butter, dry milk powder, plain [place] yogurt, and cheese ), adequate hydration & fluid choices, sipping on calorie containing beverages (examples include: adding whole milk or cream to coffee, oral nutrition supplements, juice, electrolyte replacement beverages, milk, etc ), eating smaller more frequent meals every 2-3 hours (5-6 small meals/day), having consistent and planned snacks between meals, eating when feeling most hungry and continuing oral nutrition supplements  Moving forward, Dick Krause was encouraged to increase kcal, protein, and fluid intakes  She will continue her oral nutrition supplement, grazing throughout the day, and work on adding a protein source to her snacks  Materials Provided: not applicable  All questions and concerns addressed during todays visit  Dick Krause has RD contact information  Nutrition Diagnosis:  Inadequate Energy Intake related to physiological causes, disease state and treatment related issues as evidenced by food recall, wt loss and discussion with pt and/or family  Increased Nutrient Needs (kcal & pro) related to increased demand for nutrients and disease state as evidenced by cancer dx and pt undergoing tx for cancer  Altered GI Function related to alteration in GI tract motility as evidenced by Constipation  Behavorial/Environmental- Knowledge & Beliefs: Limited adherence to nutrition-related recommendations related to behavioral causes as evidenced by Per pt interview, has not acheived nutrition goals set over multiple sessions  Monitoring & Evaluation:   Goals:  · weight maintenance/stabilization  · adequate nutrition impact symptom management  · pt to meet >/=75% estimated nutrition needs daily   · Eat 5-6 small meals per day  · Start 1 oral nutrition supplement per day    · Progress Towards Goals: Progressing and Partially Met    Nutrition Rx & Recommendations:  · Small, frequent meals/snacks may be easier to tolerate than 3 large daily meals  Aim for 5-6 small meals per day (every 2-3 hours)  · Include protein at all meals/snacks    · Follow a Cancer Preventative Nutrition Pattern: colorful, plant-based, low-fat, avoid added sugars, limit alcohol, include high fiber foods, limit processed meats, limit red meat, choose lean protein sources, use low-fat cooking methods, balance calories with physical activity, avoid excessive weight gain throughout life   · Incorporate physical activity as able/allowed  · For appetite loss: try powdered or liquid nutrition supplements; eating by the clock every 2-3 hours; set a timer to remind yourself to eat, keep snacks nearby; add extra protein & calories to your diet; drink liquids in between meals, choose liquids that add calories; eat a bedtime snack; eat soft, cool or frozen foods; eat a larger meal when you feel well & rested; only sip small amounts of liquids during meals (see pages 10-12 in your Eating Hints book)  · For weight loss: monitor your weight at home at least 2x/week, record your weight, start by adding 250-500 extra calories per day, eat 5-6 small scheduled meals every 2-3 hrs, choose foods that are high in protein and calories (see pages 49-53 in your Eating Hints book), drink liquids with calories for example: milkshakes/smoothies/juice/soup/whole milk/chocolate milk, cook with protein fortified milk (see recipe on page 36 in your Eating Hints book), consider ready-to-drink oral nutrition supplements such as Ensure Plus, Ensure Enlive, Boost Plus, or Boost Very High Calorie, avoid "diet" and "light" foods when possible, avoid drinking too much with meals, contact your dietitian with any continued weight loss over the course of 1 week  For more info see pages 35-37 in your Eating Hints book  · For constipation: drink plenty of fluids (>64 oz/day); drink hot liquids; eat high-fiber foods as tolerated (whole grains, beans, peas, nuts, seeds, fruits, vegetables, etc); increase physical activity as tolerated  Avoid increasing fiber intake too quickly, add fiber into your diet slowly; keep a record of your bowel movements (see pages 13-14 in you Eating Hints book)  · Fluid Goal: 46-55 oz per day  Aim for at least 55 oz per day while having constipation  · Continue 1 [place] Instant Breakfast shake made with 2% milk in the morning as a snack    · Snack ideas: [place] yogurt with fresh fruit, veggies with humus, pretzels with peanut butter, grapes and cheese, watermelon with a cheese stick , etc   · Start weighing yourself once a week at home  · Lifetime vitamin/mineral supplementation recommended for Gastric Bypass Surgery:  · Multivitamin BID  · Iron 45 mg daily  · Vitamin D daily  · Calcium Citrate 500 mg TID (do not take with iron, needs to be 2 hrs apart from iron)  · B12 1000 mcg daily    · Resources for recipes: Real Food Real Kitchens, cancerdietitian  com, oncVertical Acuity org/support/nutrition-and-cancer, SkinXingyun.cn, www oncologynutrition  org/erfc/recipes, www cookforyourlife  org, The Essential Cancer Treatment Nutrition Guide and Cookbook: Includes 552 Healthy and Delicious Recipes      Follow Up Plan: 6/7/21 during infusion   Recommend Referral to Other Providers: none at this time

## 2021-05-14 ENCOUNTER — HOSPITAL ENCOUNTER (OUTPATIENT)
Dept: INFUSION CENTER | Facility: HOSPITAL | Age: 66
Discharge: HOME | End: 2021-05-14
Payer: COMMERCIAL

## 2021-05-14 ENCOUNTER — TELEPHONE (OUTPATIENT)
Dept: GYNECOLOGIC ONCOLOGY | Facility: CLINIC | Age: 66
End: 2021-05-14

## 2021-05-14 ENCOUNTER — DOCUMENTATION (OUTPATIENT)
Dept: OTHER | Facility: HOSPITAL | Age: 66
End: 2021-05-14

## 2021-05-14 ENCOUNTER — OFFICE VISIT (OUTPATIENT)
Dept: GYNECOLOGIC ONCOLOGY | Facility: CLINIC | Age: 66
End: 2021-05-14
Payer: COMMERCIAL

## 2021-05-14 VITALS
HEART RATE: 68 BPM | BODY MASS INDEX: 36.29 KG/M2 | WEIGHT: 180 LBS | SYSTOLIC BLOOD PRESSURE: 118 MMHG | HEIGHT: 59 IN | DIASTOLIC BLOOD PRESSURE: 76 MMHG | TEMPERATURE: 97.8 F

## 2021-05-14 VITALS — TEMPERATURE: 98.3 F

## 2021-05-14 DIAGNOSIS — K59.03 DRUG INDUCED CONSTIPATION: ICD-10-CM

## 2021-05-14 DIAGNOSIS — M89.9 PUBIC BONE PAIN: ICD-10-CM

## 2021-05-14 DIAGNOSIS — C54.1 ENDOMETRIAL CANCER (HCC): Primary | ICD-10-CM

## 2021-05-14 DIAGNOSIS — Z93.6 NEPHROSTOMY STATUS (HCC): ICD-10-CM

## 2021-05-14 DIAGNOSIS — Z45.2 ENCOUNTER FOR CENTRAL LINE CARE: ICD-10-CM

## 2021-05-14 DIAGNOSIS — S32.501G: ICD-10-CM

## 2021-05-14 DIAGNOSIS — C54.1 ENDOMETRIAL CANCER (HCC): Primary | Chronic | ICD-10-CM

## 2021-05-14 PROBLEM — R31.9 URINARY TRACT INFECTION WITH HEMATURIA: Status: RESOLVED | Noted: 2020-08-06 | Resolved: 2021-05-14

## 2021-05-14 PROBLEM — N39.0 URINARY TRACT INFECTION WITH HEMATURIA: Status: RESOLVED | Noted: 2020-08-06 | Resolved: 2021-05-14

## 2021-05-14 LAB
ALBUMIN SERPL BCP-MCNC: 3.5 G/DL (ref 3.5–5)
ALP SERPL-CCNC: 168 U/L (ref 46–116)
ALT SERPL W P-5'-P-CCNC: 29 U/L (ref 12–78)
AMYLASE SERPL-CCNC: 28 IU/L (ref 25–115)
ANION GAP SERPL CALCULATED.3IONS-SCNC: 8 MMOL/L (ref 4–13)
APTT PPP: 40 SECONDS (ref 23–37)
AST SERPL W P-5'-P-CCNC: 16 U/L (ref 5–45)
BASOPHILS # BLD AUTO: 0.06 THOUSANDS/ΜL (ref 0–0.1)
BASOPHILS NFR BLD AUTO: 1 % (ref 0–1)
BILIRUB SERPL-MCNC: 0.22 MG/DL (ref 0.2–1)
BUN SERPL-MCNC: 16 MG/DL (ref 5–25)
CALCIUM SERPL-MCNC: 10 MG/DL (ref 8.3–10.1)
CHLORIDE SERPL-SCNC: 109 MMOL/L (ref 100–108)
CHOLEST SERPL-MCNC: 182 MG/DL (ref 50–200)
CO2 SERPL-SCNC: 22 MMOL/L (ref 21–32)
CREAT SERPL-MCNC: 1.04 MG/DL (ref 0.6–1.3)
EOSINOPHIL # BLD AUTO: 0.02 THOUSAND/ΜL (ref 0–0.61)
EOSINOPHIL NFR BLD AUTO: 0 % (ref 0–6)
ERYTHROCYTE [DISTWIDTH] IN BLOOD BY AUTOMATED COUNT: 14.3 % (ref 11.6–15.1)
GFR SERPL CREATININE-BSD FRML MDRD: 57 ML/MIN/1.73SQ M
GGT SERPL-CCNC: 13 U/L (ref 5–85)
GLUCOSE SERPL-MCNC: 66 MG/DL (ref 65–140)
HCT VFR BLD AUTO: 31.9 % (ref 34.8–46.1)
HGB BLD-MCNC: 10.2 G/DL (ref 11.5–15.4)
IMM GRANULOCYTES # BLD AUTO: 0.12 THOUSAND/UL (ref 0–0.2)
IMM GRANULOCYTES NFR BLD AUTO: 2 % (ref 0–2)
INR PPP: 0.97 (ref 0.84–1.19)
LIPASE SERPL-CCNC: 112 U/L (ref 73–393)
LYMPHOCYTES # BLD AUTO: 0.7 THOUSANDS/ΜL (ref 0.6–4.47)
LYMPHOCYTES NFR BLD AUTO: 9 % (ref 14–44)
MAGNESIUM SERPL-MCNC: 2.1 MG/DL (ref 1.6–2.6)
MCH RBC QN AUTO: 32.5 PG (ref 26.8–34.3)
MCHC RBC AUTO-ENTMCNC: 32 G/DL (ref 31.4–37.4)
MCV RBC AUTO: 102 FL (ref 82–98)
MONOCYTES # BLD AUTO: 0.94 THOUSAND/ΜL (ref 0.17–1.22)
MONOCYTES NFR BLD AUTO: 12 % (ref 4–12)
NEUTROPHILS # BLD AUTO: 5.96 THOUSANDS/ΜL (ref 1.85–7.62)
NEUTS SEG NFR BLD AUTO: 76 % (ref 43–75)
NRBC BLD AUTO-RTO: 0 /100 WBCS
PHOSPHATE SERPL-MCNC: 3.6 MG/DL (ref 2.3–4.1)
PLATELET # BLD AUTO: 287 THOUSANDS/UL (ref 149–390)
PMV BLD AUTO: 10.3 FL (ref 8.9–12.7)
POTASSIUM SERPL-SCNC: 4 MMOL/L (ref 3.5–5.3)
PROT SERPL-MCNC: 8.3 G/DL (ref 6.4–8.2)
PROTHROMBIN TIME: 12.9 SECONDS (ref 11.6–14.5)
RBC # BLD AUTO: 3.14 MILLION/UL (ref 3.81–5.12)
SODIUM SERPL-SCNC: 139 MMOL/L (ref 136–145)
T3 SERPL-MCNC: 1.6 NG/ML (ref 0.6–1.8)
T4 FREE SERPL-MCNC: 1.04 NG/DL (ref 0.76–1.46)
TSH SERPL DL<=0.05 MIU/L-ACNC: 1.89 UIU/ML (ref 0.36–3.74)
WBC # BLD AUTO: 7.8 THOUSAND/UL (ref 4.31–10.16)

## 2021-05-14 PROCEDURE — 83735 ASSAY OF MAGNESIUM: CPT | Performed by: OBSTETRICS & GYNECOLOGY

## 2021-05-14 PROCEDURE — 85730 THROMBOPLASTIN TIME PARTIAL: CPT | Performed by: OBSTETRICS & GYNECOLOGY

## 2021-05-14 PROCEDURE — 84100 ASSAY OF PHOSPHORUS: CPT | Performed by: OBSTETRICS & GYNECOLOGY

## 2021-05-14 PROCEDURE — 83690 ASSAY OF LIPASE: CPT | Performed by: OBSTETRICS & GYNECOLOGY

## 2021-05-14 PROCEDURE — 85610 PROTHROMBIN TIME: CPT | Performed by: OBSTETRICS & GYNECOLOGY

## 2021-05-14 PROCEDURE — 84480 ASSAY TRIIODOTHYRONINE (T3): CPT | Performed by: OBSTETRICS & GYNECOLOGY

## 2021-05-14 PROCEDURE — 82465 ASSAY BLD/SERUM CHOLESTEROL: CPT | Performed by: OBSTETRICS & GYNECOLOGY

## 2021-05-14 PROCEDURE — 84439 ASSAY OF FREE THYROXINE: CPT | Performed by: OBSTETRICS & GYNECOLOGY

## 2021-05-14 PROCEDURE — 82977 ASSAY OF GGT: CPT | Performed by: OBSTETRICS & GYNECOLOGY

## 2021-05-14 PROCEDURE — 85025 COMPLETE CBC W/AUTO DIFF WBC: CPT | Performed by: OBSTETRICS & GYNECOLOGY

## 2021-05-14 PROCEDURE — 84443 ASSAY THYROID STIM HORMONE: CPT | Performed by: OBSTETRICS & GYNECOLOGY

## 2021-05-14 PROCEDURE — 99214 OFFICE O/P EST MOD 30 MIN: CPT | Performed by: NURSE PRACTITIONER

## 2021-05-14 PROCEDURE — 80053 COMPREHEN METABOLIC PANEL: CPT | Performed by: OBSTETRICS & GYNECOLOGY

## 2021-05-14 PROCEDURE — 82150 ASSAY OF AMYLASE: CPT | Performed by: OBSTETRICS & GYNECOLOGY

## 2021-05-14 RX ORDER — ACETAMINOPHEN AND CODEINE PHOSPHATE 300; 30 MG/1; MG/1
1 TABLET ORAL EVERY 6 HOURS PRN
Qty: 20 TABLET | Refills: 0 | Status: SHIPPED | OUTPATIENT
Start: 2021-05-14 | End: 2021-11-09 | Stop reason: SDUPTHER

## 2021-05-14 NOTE — ASSESSMENT & PLAN NOTE
46-year-old with recurrent stage IB endometrial cancer who is s/p cycle 8 of treatment on the EndoBARR trial  Continues with constipation  Pubic bone pain has somewhat increased  She was not able to give a urine sample at the lab today so a cup was provided to her for UA/UPC and she will deliver to the lab this evening or over the weekend  Her performance status is 1  Patient will proceed with treatment on Monday  Labs reviewed  She will return to the office as per clinical trial scheduling

## 2021-05-14 NOTE — PLAN OF CARE
Problem: Potential for Falls  Goal: Patient will remain free of falls  Description: INTERVENTIONS:  - Assess patient frequently for physical needs  -  Identify cognitive and physical deficits and behaviors that affect risk of falls    -  [place] fall precautions as indicated by assessment   - Educate patient/family on patient safety including physical limitations  - Instruct patient to call for assistance with activity based on assessment  - Modify environment to reduce risk of injury  - Consider OT/PT consult to assist with strengthening/mobility  Outcome: Progressing

## 2021-05-14 NOTE — PROGRESS NOTES
Patient was seen in the office for her Cycle 8 Day 1 pre treatment office visit with Harsh Orr  Patient AEs and Conmeds were reviewed  Patient denied changes to her conmeds, but reported that she is experiencing worsening constipation with infrequent bowel movements happening every 2-3 days  Patient reported that she is feeling uncomfortable  RAFITA Orr recommended the use OTC laxative suppositories to help patient with these symptoms  Patient also reported some worsening pain in her pelvic bone that is r/t prior radiation treatment  RAFITA Orr ordered Tylenol #3 to help with these symptoms  Future appointments were reviewed  Overall, patient states that she is feeling okay, and was told to call with any questions or concerns

## 2021-05-14 NOTE — TELEPHONE ENCOUNTER
Orthopaedic consult scheduled for 5/26/21 at 11:30 at the [facility], [address]  Left message with appointment details and their phone number of [phone] to call back if she cannot make that appointment

## 2021-05-14 NOTE — PROGRESS NOTES
Labs have been reviewed and signed by Dr Rodriguez on 5/14/21  Per protocol, patient is ok to proceed with Cycle 8 Day 1 treatment on Monday 5/17/21

## 2021-05-14 NOTE — PROGRESS NOTES
Assessment/Plan:    Problem List Items Addressed This Visit        Digestive    Drug induced constipation     Patient continues to have difficulty with constipation  She is still going 2-3 days between BMs  Taking lactulose which was prescribed at her last visit has been helpful in that it usually works quickly after taking  We discussed using lactulose more frequently and also adding OTC suppository daily PRN  Musculoskeletal and Integument    Closed nondisplaced fracture of right pubis with delayed healing     Not new, d/t RT  Now causing more pain  Tylenol #3 sent as below  Will obtain pelvic XR series and refer to orthopedics  Relevant Orders    Ambulatory referral to Orthopedic Surgery    XR pelvis complete 3+ vw       Genitourinary    Endometrial cancer (HCC) - Primary (Chronic)     63year-old with recurrent stage IB endometrial cancer who is s/p cycle 8 of treatment on the EndoBARR trial  Continues with constipation  Pubic bone pain has somewhat increased  She was not able to give a urine sample at the lab today so a cup was provided to her for UA/UPC and she will deliver to the lab this evening or over the weekend  Her performance status is 1  Patient will proceed with treatment on Monday  Labs reviewed  She will return to the office as per clinical trial scheduling  Other    Nephrostomy status ([facility] [address] )     Draining appropriately per patient  Pubic bone pain     Known fracture of the right medial pubic bone, identified on PET in 8/2020, related to prior RT  This has been uncomfortable but not terribly bothersome, but has recently become more painful  Patient is taking Tylenol without much relief  She is unable to take NSAIDs  We will trial Tylenol #3  Patient instructed to be vigilant with her bowel regimen to prevent further constipation, which is already a problem for her           Relevant Medications    acetaminophen-codeine (TYLENOL #3) 300-30 mg per tablet    Other Relevant Orders    Ambulatory referral to Orthopedic Surgery    XR pelvis complete 3+ vw            CHIEF COMPLAINT: Pre-chemotherapy evaluation      Problem:  Cancer Staging  Endometrial cancer ([facility] [address] )  Staging form: Corpus Uteri - Carcinoma, AJCC 7th Edition  - Clinical stage from 12/19/2017: FIGO Stage IB (T1b, N0, M0) - Signed by Sigifredo Desir MD on 2/12/2018        Previous therapy:  Oncology History   Endometrial cancer ([facility] [address] )   11/17/2017 Initial Diagnosis    Endometrial cancer ([facility] [address] )     11/17/2017 Biopsy    ENDOMETRIAL BIOPSY: WELL DIFFERENTIATED ENDOMETRIAL ADENOCARCINOMA (FIGO I) WITH FOCALMUCINOUS FEATURES  Part B: ENDOCERVICAL POLYP:BENIGN ENDOCERVICAL      12/19/2017 Surgery    Robotic assisted total laparoscopic hysterectomy with bilateral salpingo-oophorectomy and sentinel bilateral pelvic lymph node dissection  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 (4 4 x 3 2 cm tumor, 9 4/15 4mm invasion, NO LVSI, washings revealed atypical cellular changes)     12/19/2017 Genetic Testing    Morrison testing negative     6/28/2019 Biopsy    A  Breast, Right, US BX Right Breast 1000 4 cmfn:  - Benign breast tissue with focal histiocytic aggregate  See comment   - Negative for atypia and in-situ or invasive carcinoma  7/8/2019 Recurrence    Presented with right lower extremity DVT and CT demonstrating right pelvic sidewall mass with venous, ureteral and nerve compression causing significant neuropathic pain  Biopsy:  Lymph Node, Right pelvic lymph node x3:  - High-grade adenocarcinoma  7/30/2019 - 1/6/2020 Chemotherapy    Taxol 175 mg/m2 and carboplatin AUC 6 every 21 days  Dose was reduced to taxol 135 mg/m2 and carboplatin AUC 5  Completed 6 cycles  Treatment protracted due to multiple hospital admissions       2/24/2020 - 4/13/2020 Radiation    adjuvant external beam radiation therapy to the whole pelvis to 4500 cGy followed by boost to gross disease of an additional 2200 cGy 11/23/2020 Progression    New necrotic adenopathy in the retroperitoneum on CT      12/21/2020 -  Chemotherapy    Began chemotherapy with rucaparib, atezolizumab, and bevacizumab as per [facility] clinical trial            Patient ID: Andrea Alarcon is a 63 y o  female     62 y/o enrolled in the [facility] trial since December 2020  She has been tolerating this well overally  Has been experiencing chronic constipation  Recently added lactulose to her regimen with some but not full effect  She has been afebrile  She has occasional nausea, but no vomiting  Her appetite is fair  She is voiding without difficulty  She has an increase in abdominal pain at times related to constipation, and more recently, increase in pubic bone pain, where she has a known fracture related to prior radiation  No vaginal bleeding or discharge  She denies chemotherapy-induced neuropathy changed from her baseline  She is ambulatory with a cane  The following portions of the patient's history were reviewed and updated as appropriate: allergies, current medications, past family history, past medical history, past social history, past surgical history and problem list     Review of Systems   Constitutional: Positive for fatigue  Negative for activity change, appetite change, chills, fever and unexpected weight change  HENT: Negative for mouth sores  Eyes: Negative  Respiratory: Negative for cough, chest tightness, shortness of breath and wheezing  Cardiovascular: Negative for chest pain, palpitations and leg swelling  Gastrointestinal: Positive for constipation and nausea  Negative for abdominal distention, abdominal pain, anal bleeding, blood in stool, diarrhea and vomiting  Endocrine: Negative  Genitourinary: Positive for pelvic pain  Negative for difficulty urinating, dysuria, flank pain, frequency, hematuria, urgency, vaginal bleeding, vaginal discharge and vaginal pain     Musculoskeletal: Negative for arthralgias and myalgias  Skin: Negative for color change, pallor and rash  Neurological: Negative for dizziness, weakness, numbness and headaches  Hematological: Negative  Psychiatric/Behavioral: The patient is not nervous/anxious          Current Outpatient Medications   Medication Sig Dispense Refill    acetaminophen (TYLENOL) 325 mg tablet Take 2 tablets (650 mg total) by mouth every 6 (six) hours as needed for mild pain, headaches or fever 30 tablet 0    acetaminophen-codeine (TYLENOL #3) 300-30 mg per tablet Take 1 tablet by mouth every 6 (six) hours as needed for moderate pain 20 tablet 0    ARIPiprazole (ABILIFY) 10 mg tablet Take 10 mg by mouth daily      aspirin (ECOTRIN LOW STRENGTH) 81 mg EC tablet Take 81 mg by mouth daily      Calcium-Magnesium-Vitamin D (CALCIUM 500 PO) Take by mouth daily       cholecalciferol (VITAMIN D3) 1,000 units tablet Take 1,000 Units by mouth daily      Cyanocobalamin (VITAMIN B12 PO) Take by mouth daily       enoxaparin (LOVENOX) 120 mg/0 8 mL INJECT 0 8 ML (120 MG TOTAL) UNDER THE SKIN DAILY 24 Syringe 3    folic acid (FOLVITE) 1 mg tablet Take 1 tablet (1 mg total) by mouth daily  0    gemfibrozil (LOPID) 600 mg tablet TAKE 1 TABLET BY MOUTH EVERY DAY 90 tablet 1    lactulose 20 g/30 mL Take 45 mL (30 g total) by mouth 2 (two) times a day 300 mL 1    LORazepam (ATIVAN) 0 5 mg tablet Take 1 tablet (0 5 mg total) by mouth every 6 (six) hours as needed for anxiety (or nausea) 36 tablet 1    Multiple Vitamin (MULTIVITAMIN) tablet Take 1 tablet by mouth daily      Myrbetriq 50 MG TB24 TAKE 1 TABLET BY MOUTH EVERY DAY 30 tablet 5    nitrofurantoin (MACROBID) 100 mg capsule Take 1 capsule (100 mg total) by mouth 2 (two) times a day 14 capsule 0    omeprazole (PriLOSEC) 20 mg delayed release capsule Take 20 mg by mouth daily      ondansetron (ZOFRAN) 8 mg tablet Take 1 tablet (8 mg total) by mouth every 8 (eight) hours as needed for nausea or vomiting 30 tablet 1    oxybutynin (DITROPAN XL) 15 MG 24 hr tablet Take 1 tablet (15 mg total) by mouth daily at bedtime 90 tablet 3    quinapril (ACCUPRIL) 5 mg tablet TAKE 1 TABLET BY MOUTH EVERYDAY AT BEDTIME 90 tablet 1    rucaparib (RUBRACA) 300 mg tablet Take 600 mg by mouth every 12 (twelve) hours Take with or without food  Do not repeat a vomited dose   saccharomyces boulardii (FLORASTOR) 250 mg capsule Take 1 capsule (250 mg total) by mouth 2 (two) times a day  0    Sodium Chloride Flush (Normal Saline Flush) 0 9 % SOLN       sodium chloride, PF, 0 9 % 10 mL by Intracatheter route daily for 30 doses 10cc syringes 300 mL 3    venlafaxine (EFFEXOR) 75 mg tablet Take 75 mg by mouth 3 (three) times a day         No current facility-administered medications for this visit  Objective:    Blood pressure 118/76, pulse 68, temperature 97 8 °F (36 6 °C), temperature source Tympanic, height 4' 11" (1 499 m), weight 81 6 kg (180 lb), not currently breastfeeding  Body mass index is 36 36 kg/m²  Body surface area is 1 76 meters squared  Physical Exam  Vitals signs reviewed  Constitutional:       General: She is not in acute distress  Appearance: Normal appearance  She is not ill-appearing  HENT:      Head: Normocephalic and atraumatic  Mouth/Throat:      Mouth: Mucous membranes are moist    Eyes:      General: No scleral icterus  Right eye: No discharge  Left eye: No discharge  Conjunctiva/sclera: Conjunctivae normal    Pulmonary:      Effort: Pulmonary effort is normal    Musculoskeletal:      Right lower leg: No edema  Left lower leg: No edema  Skin:     General: Skin is warm and dry  Coloration: Skin is not jaundiced  Findings: No rash  Neurological:      General: No focal deficit present  Mental Status: She is alert and oriented to person, place, and time  Cranial Nerves: No cranial nerve deficit  Sensory: No sensory deficit        Motor: No weakness  Gait: Gait normal    Psychiatric:         Mood and Affect: Mood normal          Behavior: Behavior normal          Thought Content:  Thought content normal          Judgment: Judgment normal          No results found for:   Lab Results   Component Value Date     10/27/2017    K 4 0 05/14/2021     (H) 05/14/2021    CO2 22 05/14/2021    BUN 16 05/14/2021    CREATININE 1 04 05/14/2021    GLUCOSE 219 (H) 12/19/2017    GLUF 105 (H) 04/02/2021    CALCIUM 10 0 05/14/2021    CORRECTEDCA 10 2 (H) 12/09/2019    AST 16 05/14/2021    ALT 29 05/14/2021    ALKPHOS 168 (H) 05/14/2021    PROT 6 9 10/27/2017    BILITOT 0 3 10/27/2017    EGFR 57 05/14/2021     Lab Results   Component Value Date    WBC 7 80 05/14/2021    HGB 10 2 (L) 05/14/2021    HCT 31 9 (L) 05/14/2021     (H) 05/14/2021     05/14/2021     Lab Results   Component Value Date    NEUTROABS 5 96 05/14/2021        Trend:  No results found for:

## 2021-05-14 NOTE — ASSESSMENT & PLAN NOTE
Known fracture of the right medial pubic bone, identified on PET in 8/2020, related to prior RT  This has been uncomfortable but not terribly bothersome, but has recently become more painful  Patient is taking Tylenol without much relief  She is unable to take NSAIDs  We will trial Tylenol #3  Patient instructed to be vigilant with her bowel regimen to prevent further constipation, which is already a problem for her

## 2021-05-14 NOTE — ASSESSMENT & PLAN NOTE
Not new, d/t RT  Now causing more pain  Tylenol #3 sent as below  Will obtain pelvic XR series and refer to orthopedics

## 2021-05-14 NOTE — ASSESSMENT & PLAN NOTE
Patient continues to have difficulty with constipation  She is still going 2-3 days between BMs  Taking lactulose which was prescribed at her last visit has been helpful in that it usually works quickly after taking  We discussed using lactulose more frequently and also adding OTC suppository daily PRN

## 2021-05-15 ENCOUNTER — APPOINTMENT (OUTPATIENT)
Dept: LAB | Facility: CLINIC | Age: 66
End: 2021-05-15
Payer: COMMERCIAL

## 2021-05-15 LAB
BACTERIA UR QL AUTO: ABNORMAL /HPF
BILIRUB UR QL STRIP: NEGATIVE
CLARITY UR: CLEAR
COLOR UR: YELLOW
CREAT UR-MCNC: 26 MG/DL
GLUCOSE UR STRIP-MCNC: NEGATIVE MG/DL
HGB UR QL STRIP.AUTO: ABNORMAL
KETONES UR STRIP-MCNC: NEGATIVE MG/DL
LEUKOCYTE ESTERASE UR QL STRIP: ABNORMAL
NITRITE UR QL STRIP: NEGATIVE
NON-SQ EPI CELLS URNS QL MICRO: ABNORMAL /HPF
PH UR STRIP.AUTO: 5.5 [PH]
PROT UR STRIP-MCNC: NEGATIVE MG/DL
PROT UR-MCNC: 17 MG/DL
PROT/CREAT UR: 0.65 MG/G{CREAT} (ref 0–0.1)
RBC #/AREA URNS AUTO: ABNORMAL /HPF
SP GR UR STRIP.AUTO: 1.01 (ref 1–1.03)
UROBILINOGEN UR QL STRIP.AUTO: 0.2 E.U./DL
WBC #/AREA URNS AUTO: ABNORMAL /HPF

## 2021-05-15 PROCEDURE — 84156 ASSAY OF PROTEIN URINE: CPT

## 2021-05-15 PROCEDURE — 82570 ASSAY OF URINE CREATININE: CPT

## 2021-05-15 PROCEDURE — 81001 URINALYSIS AUTO W/SCOPE: CPT

## 2021-05-17 ENCOUNTER — DOCUMENTATION (OUTPATIENT)
Dept: OTHER | Facility: HOSPITAL | Age: 66
End: 2021-05-17

## 2021-05-17 ENCOUNTER — HOSPITAL ENCOUNTER (OUTPATIENT)
Dept: INFUSION CENTER | Facility: CLINIC | Age: 66
Discharge: HOME | End: 2021-05-17
Payer: COMMERCIAL

## 2021-05-17 ENCOUNTER — NUTRITION (OUTPATIENT)
Dept: NUTRITION | Facility: CLINIC | Age: 66
End: 2021-05-17

## 2021-05-17 ENCOUNTER — PATIENT OUTREACH (OUTPATIENT)
Dept: CASE MANAGEMENT | Facility: HOSPITAL | Age: 66
End: 2021-05-17

## 2021-05-17 VITALS
HEIGHT: 59 IN | TEMPERATURE: 97.8 F | DIASTOLIC BLOOD PRESSURE: 76 MMHG | SYSTOLIC BLOOD PRESSURE: 124 MMHG | WEIGHT: 178 LBS | HEART RATE: 67 BPM | RESPIRATION RATE: 21 BRPM | BODY MASS INDEX: 35.88 KG/M2 | OXYGEN SATURATION: 99 %

## 2021-05-17 DIAGNOSIS — Z45.2 ENCOUNTER FOR CENTRAL LINE CARE: Primary | ICD-10-CM

## 2021-05-17 DIAGNOSIS — Z71.3 NUTRITIONAL COUNSELING: Primary | ICD-10-CM

## 2021-05-17 DIAGNOSIS — C54.1 ENDOMETRIAL CANCER (HCC): ICD-10-CM

## 2021-05-17 PROCEDURE — J9999Q0 INV ATEZOLIZUMAB 1200MG/20ML 20 ML: Performed by: OBSTETRICS & GYNECOLOGY

## 2021-05-17 PROCEDURE — 96413 CHEMO IV INFUSION 1 HR: CPT

## 2021-05-17 PROCEDURE — 96417 CHEMO IV INFUS EACH ADDL SEQ: CPT

## 2021-05-17 PROCEDURE — J9035Q0 INV BEVACIZUMAB 400MG/16ML 16 ML: Performed by: OBSTETRICS & GYNECOLOGY

## 2021-05-17 RX ORDER — SODIUM CHLORIDE 9 MG/ML
20 INJECTION, SOLUTION INTRAVENOUS CONTINUOUS
Status: DISCONTINUED | OUTPATIENT
Start: 2021-05-17 | End: 2021-05-20 | Stop reason: HOSPADM

## 2021-05-17 RX ADMIN — SODIUM CHLORIDE 20 ML/HR: 0.9 INJECTION, SOLUTION INTRAVENOUS at 09:15

## 2021-05-17 RX ADMIN — Medication 1214 MG: at 10:19

## 2021-05-17 RX ADMIN — Medication 1200 MG: at 09:35

## 2021-05-17 NOTE — PATIENT INSTRUCTIONS
Nutrition Rx & Recommendations:  · Small, frequent meals/snacks may be easier to tolerate than 3 large daily meals  Aim for 5-6 small meals per day (every 2-3 hours)  · Include protein at all meals/snacks  · Follow a Cancer Preventative Nutrition Pattern: colorful, plant-based, low-fat, avoid added sugars, limit alcohol, include high fiber foods, limit processed meats, limit red meat, choose lean protein sources, use low-fat cooking methods, balance calories with physical activity, avoid excessive weight gain throughout life  · Incorporate physical activity as able/allowed  · For appetite loss: try powdered or liquid nutrition supplements; eating by the clock every 2-3 hours; set a timer to remind yourself to eat, keep snacks nearby; add extra protein & calories to your diet; drink liquids in between meals, choose liquids that add calories; eat a bedtime snack; eat soft, cool or frozen foods; eat a larger meal when you feel well & rested; only sip small amounts of liquids during meals (see pages 10-12 in your Eating Hints book)  · For weight loss: monitor your weight at home at least 2x/week, record your weight, start by adding 250-500 extra calories per day, eat 5-6 small scheduled meals every 2-3 hrs, choose foods that are high in protein and calories (see pages 49-53 in your Eating Hints book), drink liquids with calories for example: milkshakes/smoothies/juice/soup/whole milk/chocolate milk, cook with protein fortified milk (see recipe on page 36 in your Eating Hints book), consider ready-to-drink oral nutrition supplements such as Ensure Plus, Ensure Enlive, Boost Plus, or Boost Very High Calorie, avoid "diet" and "light" foods when possible, avoid drinking too much with meals, contact your dietitian with any continued weight loss over the course of 1 week  For more info see pages 35-37 in your Eating Hints book    · For constipation: drink plenty of fluids (>64 oz/day); drink hot liquids; eat high-fiber foods as tolerated (whole grains, beans, peas, nuts, seeds, fruits, vegetables, etc); increase physical activity as tolerated  Avoid increasing fiber intake too quickly, add fiber into your diet slowly; keep a record of your bowel movements (see pages 13-14 in you Eating Hints book)  · Fluid Goal: 46-55 oz per day  Aim for at least 55 oz per day while having constipation  · Continue 1 [place] Instant Breakfast shake made with 2% milk in the morning as a snack  · Snack ideas: [place] yogurt with fresh fruit, veggies with humus, pretzels with peanut butter, grapes and cheese, watermelon with a cheese stick , etc   · Start weighing yourself once a week at home  · Lifetime vitamin/mineral supplementation recommended for Gastric Bypass Surgery:  · Multivitamin BID  · Iron 45 mg daily  · Vitamin D daily  · Calcium Citrate 500 mg TID (do not take with iron, needs to be 2 hrs apart from iron)  · B12 1000 mcg daily    · Resources for recipes: [employer], cancerdietitian  com, oncolink org/support/nutrition-and-cancer, [employer], www oncologynutrition  org/erfc/recipes, www cookforyourlife  org, The Essential Cancer Treatment Nutrition Guide and Cookbook: Includes 392 Healthy and Delicious Recipes      Follow Up Plan: 6/7/21 during infusion   Recommend Referral to Other Providers: none at this time

## 2021-05-17 NOTE — PLAN OF CARE
Problem: Potential for Falls  Goal: Patient will remain free of falls  Description: INTERVENTIONS:  - Assess patient frequently for physical needs  -  Identify cognitive and physical deficits and behaviors that affect risk of falls  -  [place] fall precautions as indicated by assessment   - Educate patient/family on patient safety including physical limitations  - Instruct patient to call for assistance with activity based on assessment  - Modify environment to reduce risk of injury  - Consider OT/PT consult to assist with strengthening/mobility  Outcome: Progressing     Problem: Knowledge Deficit  Goal: Patient/family/caregiver demonstrates understanding of disease process, treatment plan, medications, and discharge instructions  Description: Complete learning assessment and assess knowledge base    Interventions:  - Provide teaching at level of understanding  - Provide teaching via preferred learning methods  Outcome: Progressing

## 2021-05-17 NOTE — PROGRESS NOTES
LSW met with pt in the [facility] for introductions  Pt stated she is doing well and had one question regarding where she could donate her wig  Pt stated she cannot wear the wig and would like to find a place to donate it  LSW provided information on [facility] and suggested pt call and inquire with them  LSW provided information on the role of a  and contact information  LSW encouraged pt to reach out if needs arise, pt agreed to do so

## 2021-05-17 NOTE — PROGRESS NOTES
Patient was seen in [address] [place] [address] for Cycle 8 Day 1 treatment  Patient was given Cycle 8 Rucaparib pills and drug diary, and patient returned Cycle 7 study pills and drug diary  Patient denied any changes to Conmeds and AEs  Patient was told to call with any questions or concerns, Patient tolerated treatment without incident today

## 2021-05-17 NOTE — PROGRESS NOTES
Pt to clinic for Atezolizumab and Bevacimub infusions  Bevacizumab out of range  Contacted clinical trails, dose adjusted  Pt offers no complaints at this time  Will continue to monitor

## 2021-05-19 ENCOUNTER — HOSPITAL ENCOUNTER (OUTPATIENT)
Dept: RADIOLOGY | Facility: HOSPITAL | Age: 66
Discharge: HOME | End: 2021-05-19
Payer: COMMERCIAL

## 2021-05-19 DIAGNOSIS — M89.9 PUBIC BONE PAIN: ICD-10-CM

## 2021-05-19 DIAGNOSIS — S32.501G: ICD-10-CM

## 2021-05-19 PROCEDURE — 72170 X-RAY EXAM OF PELVIS: CPT

## 2021-05-20 ENCOUNTER — TELEPHONE (OUTPATIENT)
Dept: UROLOGY | Facility: MEDICAL CENTER | Age: 66
End: 2021-05-20

## 2021-05-20 ENCOUNTER — NURSE TRIAGE (OUTPATIENT)
Dept: OTHER | Facility: OTHER | Age: 66
End: 2021-05-20

## 2021-05-20 DIAGNOSIS — R31.0 GROSS HEMATURIA: ICD-10-CM

## 2021-05-20 DIAGNOSIS — R31.9 URINARY TRACT INFECTION WITH HEMATURIA, SITE UNSPECIFIED: Primary | ICD-10-CM

## 2021-05-20 DIAGNOSIS — N39.0 URINARY TRACT INFECTION WITH HEMATURIA, SITE UNSPECIFIED: Primary | ICD-10-CM

## 2021-05-20 NOTE — TELEPHONE ENCOUNTER
Pt seen in [place] office calling stating she has nephrostomy tube: starting tues the urine was brown, changed to yellow but now changed back to brown  Pt states she is on blood thinner med

## 2021-05-20 NOTE — TELEPHONE ENCOUNTER
Patient previously managed by Dr Miriam Trinh, seen in the [place] office  Patient with history of right ureteral obstruction secondary to endometrial cancer, obstruction managed with nephrostomy tube  Patient last seen as telemedicine visit on 2/16/21, pending appointment on 7/6/21  Nephrostomy last changed on 4/6/21, pending exchange scheduled for 6/4/21  Returned call to patient  Patient reports she normally has intermittent red colored urine in her nephrostomy tube, that she associates with bleeding  Patient reports two days ago she noticed brown colored urine, which resolved, but came back briefly this morning  Patient does receive Lovenox injections daily  Discussed common to have red-brown colored urine with nephrostomy intermittently due to irritation caused by the nephrostomy tube to the kidney  Patient denies any other symptoms  Instructed on adequate hydration  Instructed to contact office if bleeding recurs but does not resolve  All questions answered

## 2021-05-21 ENCOUNTER — APPOINTMENT (OUTPATIENT)
Dept: LAB | Facility: CLINIC | Age: 66
End: 2021-05-21
Payer: COMMERCIAL

## 2021-05-21 ENCOUNTER — TELEPHONE (OUTPATIENT)
Dept: UROLOGY | Facility: MEDICAL CENTER | Age: 66
End: 2021-05-21

## 2021-05-21 DIAGNOSIS — R30.0 BURNING WITH URINATION: Primary | ICD-10-CM

## 2021-05-21 DIAGNOSIS — N39.0 URINARY TRACT INFECTION WITH HEMATURIA, SITE UNSPECIFIED: ICD-10-CM

## 2021-05-21 DIAGNOSIS — R31.9 URINARY TRACT INFECTION WITH HEMATURIA, SITE UNSPECIFIED: ICD-10-CM

## 2021-05-21 DIAGNOSIS — R30.0 BURNING WITH URINATION: ICD-10-CM

## 2021-05-21 DIAGNOSIS — R31.0 GROSS HEMATURIA: ICD-10-CM

## 2021-05-21 LAB
BACTERIA UR QL AUTO: ABNORMAL /HPF
BILIRUB UR QL STRIP: NEGATIVE
CLARITY UR: ABNORMAL
COLOR UR: YELLOW
GLUCOSE UR STRIP-MCNC: NEGATIVE MG/DL
HGB UR QL STRIP.AUTO: ABNORMAL
KETONES UR STRIP-MCNC: NEGATIVE MG/DL
LEUKOCYTE ESTERASE UR QL STRIP: ABNORMAL
NITRITE UR QL STRIP: NEGATIVE
NON-SQ EPI CELLS URNS QL MICRO: ABNORMAL /HPF
PH UR STRIP.AUTO: 6.5 [PH]
PROT UR STRIP-MCNC: ABNORMAL MG/DL
RBC #/AREA URNS AUTO: ABNORMAL /HPF
SP GR UR STRIP.AUTO: 1.01 (ref 1–1.03)
UROBILINOGEN UR QL STRIP.AUTO: 0.2 E.U./DL
WBC #/AREA URNS AUTO: ABNORMAL /HPF

## 2021-05-21 PROCEDURE — 87086 URINE CULTURE/COLONY COUNT: CPT

## 2021-05-21 PROCEDURE — 81001 URINALYSIS AUTO W/SCOPE: CPT

## 2021-05-21 RX ORDER — PHENAZOPYRIDINE HYDROCHLORIDE 200 MG/1
200 TABLET, FILM COATED ORAL
Qty: 10 TABLET | Refills: 0 | Status: SHIPPED | OUTPATIENT
Start: 2021-05-21 | End: 2021-07-06 | Stop reason: SDUPTHER

## 2021-05-21 NOTE — TELEPHONE ENCOUNTER
Called patient to advised her to get urine sample done   Patient had already completed it   Will monitor for results

## 2021-05-21 NOTE — TELEPHONE ENCOUNTER
-Patient seen in [place]  Now notes where nephrostomy tube enters her body, it is leaking    Please call and advise at [phone]

## 2021-05-21 NOTE — TELEPHONE ENCOUNTER
Notified patient of on call providers recommendation to drink extra fluids and keep guaze around tubing  Call the office in the morning for follow up  Advised to go to ED for fever, increasing pain or spreading redness  Patient verbalized understanding

## 2021-05-21 NOTE — TELEPHONE ENCOUNTER
Patient calling back advising that he tube is no longer leaking bit she advised that she thinks she has a UT  She advised that she has burning pain when she urinates  She also advised that she has blood in her urine and in her nephrostomy tube  Please advise

## 2021-05-21 NOTE — TELEPHONE ENCOUNTER
Regarding: Nephrostomy tube leaking from insertion sight   ----- Message from Geoff Black sent at 5/20/2021 10:07 PM EDT -----  "My nephrostomy tube is linking from the point where it enters my body "

## 2021-05-21 NOTE — TELEPHONE ENCOUNTER
Spoke to pt  re rt nephrostomy tube drainage  Pt stated the brown urine and leaking has resolved and the tube is draining however she started to experience burning with urination this morning  Sending pt for UA and UC and advised she drink at least 40 oz of water a day  She was also advised to call office if tube becomes blocked  Pt last seen for telemedicine visit by  CLARY Lowe on 2/16/21 for obstruction of rt ureter and OAB  Pt stated she never flushes the tube  She is scheduled for tube change on 6/4/21 with IR

## 2021-05-21 NOTE — TELEPHONE ENCOUNTER
Reason for Disposition   [1] Catheter is broken or cracked AND [2] is still usable    Answer Assessment - Initial Assessment Questions  1  SYMPTOM: "What's the main symptom you're concerned about?" (e g , frequency, incontinence)      Nephrostomy tube is leaking  2  ONSET: "When did the  S/S start?"      tonight  3  PAIN: "Is there any pain?" If so, ask: "How bad is it?" (Scale: 1-10; mild, moderate, severe)      denies  4  CAUSE: "What do you think is causing the symptoms?"      unknown  5   OTHER SYMPTOMS: "Do you have any other symptoms?" (e g , fever, flank pain, blood in urine, pain with urination)      Blood in urine, pus drainage    Protocols used: URINARY CATHETER SYMPTOMS AND QUESTIONS-ADULT-AH, URINARY [facility]

## 2021-05-22 LAB — BACTERIA UR CULT: NORMAL

## 2021-05-24 ENCOUNTER — TELEPHONE (OUTPATIENT)
Dept: GYNECOLOGIC ONCOLOGY | Facility: CLINIC | Age: 66
End: 2021-05-24

## 2021-05-24 DIAGNOSIS — K59.03 DRUG INDUCED CONSTIPATION: ICD-10-CM

## 2021-05-24 RX ORDER — LACTULOSE 20 G/30ML
SOLUTION ORAL
Qty: 300 ML | Refills: 2 | Status: SHIPPED | OUTPATIENT
Start: 2021-05-24 | End: 2021-07-23

## 2021-05-24 NOTE — TELEPHONE ENCOUNTER
Patient would like a call back to discuss her x-ray results (not finalized yet) and also needs a prescription refilled  Patient can be reached at [phone]

## 2021-05-24 NOTE — TELEPHONE ENCOUNTER
Spoke with Nikki Cristina and she isn't having burning nor blood in urine and that the pyridum helped   He urine culture should mixed contaminates

## 2021-05-25 ENCOUNTER — TELEPHONE (OUTPATIENT)
Dept: RADIOLOGY | Facility: HOSPITAL | Age: 66
End: 2021-05-25

## 2021-05-26 ENCOUNTER — OFFICE VISIT (OUTPATIENT)
Dept: OBGYN CLINIC | Facility: CLINIC | Age: 66
End: 2021-05-26
Payer: COMMERCIAL

## 2021-05-26 VITALS
DIASTOLIC BLOOD PRESSURE: 80 MMHG | SYSTOLIC BLOOD PRESSURE: 124 MMHG | WEIGHT: 172 LBS | HEIGHT: 59 IN | BODY MASS INDEX: 34.68 KG/M2 | HEART RATE: 97 BPM

## 2021-05-26 DIAGNOSIS — S32.501G: ICD-10-CM

## 2021-05-26 DIAGNOSIS — M89.9 PUBIC BONE PAIN: ICD-10-CM

## 2021-05-26 PROCEDURE — 99243 OFF/OP CNSLTJ NEW/EST LOW 30: CPT | Performed by: ORTHOPAEDIC SURGERY

## 2021-05-26 NOTE — PROGRESS NOTES
Patient Name:  David Watt  MRN:  [id number]    Assessment & Plan     Right inferior pubic rami fracture sustained approximately one year ago  1  Fracture appears fully healed on recent radiograph  2  Pain is not likely secondary to the pubic rami and the more likely due to issues with urinary tract and potentially urinary retention due to medication  3  She does have an appointment in the near future with her urologist   Devin Patel she proceed with this and contact them for a sooner appointment if available  4  Follow-up as needed with orthopedics  Chief Complaint     Anterior pelvis pain    History of the Present Illness     David Watt is a 63 y o  female seen in consultation by orthopedics requested by Nora Servin for evaluation of patient's pelvis  Patient sustained a right inferior pubic rami fracture approximately one year ago  This was treated conservatively  Recently she notes increased pain in the anterior pelvis  She is being treated for endometrial cancer and has issues with incontinence for which she was prescribed medication  She is unsure whether her pain is related to the pubic rami fracture or her urinary tract  Pain is increased with straining to urinate  She denies any pain with hip range of motion  She denies any weakness or instability in the right lower extremity  No numbness or tingling  Physical Exam     /80   Pulse 97   Ht 4' 11" (1 499 m)   Wt 78 kg (172 lb)   LMP  (LMP Unknown)   BMI 34 74 kg/m²      Right hip/pelvis:  No tenderness to palpation  Hip range of motion is flexion 90, external rotation 50, internal rotation 20  Negative FADDIR test  Negative RENETTA test   Negative logroll test   5/5 hip flexion and abduction strength  Eyes:  Anicteric sclerae  ENT:  Trachea midline  Lungs:  Normal respiratory effort  Cardiovascular:  Capillary refill is less than 2 seconds  Lymphatic:  No palpable lymphadenopathy    Skin:  Intact without erythema  Neurologic:  Sensation grossly intact to light touch  Psychiatric:  Mood and affect are appropriate  Data Review     I have personally reviewed pertinent films in PACS, and my interpretation follows:    AP pelvis radiograph 5/19/21:  Healed inferior pubic rami fracture on the right    Mild degenerative changes bilateral hip joints    Past Medical History:   Diagnosis Date    Anemia     Chemotherapy follow-up examination     Depression     Endometrial cancer ([facility] [address] ) 12/2017    Hyperglycemia     vx type 2 dm -- last assessed 4/1/14; resolved 11/7/17    Hyperlipidemia     Hypertension     Obesity     last assessed 10/14/17; resolved 11/7/17       Past Surgical History:   Procedure Laterality Date    ABDOMINAL SURGERY      GASTRIC BYPASS    CHOLECYSTECTOMY      at the time of gastric bypass    COLONOSCOPY      CT NEEDLE BIOPSY LYMPH NODE  7/8/2019    FL GUIDED CENTRAL VENOUS ACCESS DEVICE INSERTION  11/12/2019    GASTRIC BYPASS      HYSTERECTOMY Bilateral     total abdominal with salpingo-oophorectomy    IR NEPHROSTOMY TUBE PLACEMENT  7/26/2019    IR NEPHROURETERAL STENT CHECK/CHANGE/REPOSITION  4/6/2021    IR PICC LINE  9/27/2019    IR PORT PLACEMENT  7/26/2019    IR PORT REMOVAL  9/20/2019    OOPHORECTOMY Bilateral     MI INSJ TUNNELED CTR VAD W/SUBQ PORT AGE 5 YR/> Left 11/12/2019    Procedure: INSERTION VENOUS PORT ( PORT-A-CATH) IR;  Surgeon: Carlee Guerra DO;  Location: [facility];  Service: Interventional Radiology    MI LAP, RADICAL HYST W/ TUBE&OV, NODE BX N/A 12/19/2017    Procedure: HYSTERECTOMY LAPAROSCOPIC TOTAL ([address]) W/ ROBOTICS; BILATERAL SALPINGOOPHERECTOMY; LYMPH NODE DISSECTION; lysis of adhesions;  Surgeon: Madeline Moore MD;  [facility]: [facility];  Service: Gynecology Oncology    MI LAP,DIAGNOSTIC ABDOMEN N/A 12/19/2017    Procedure: LAPAROSCOPY DIAGNOSTIC;  Surgeon: Madeline Moore MD;  [facility];  Service: Gynecology Oncology    TONSILLECTOMY  US GUIDED BREAST BIOPSY RIGHT COMPLETE Right 6/28/2019       Allergies   Allergen Reactions    Cephalosporins Rash     Which Cephalosporin reaction was to not specified; however, has tolerated Amoxicillin, Cefazolin, and Cefepime       Current Outpatient Medications on File Prior to Visit   Medication Sig Dispense Refill    acetaminophen (TYLENOL) 325 mg tablet Take 2 tablets (650 mg total) by mouth every 6 (six) hours as needed for mild pain, headaches or fever 30 tablet 0    acetaminophen-codeine (TYLENOL #3) 300-30 mg per tablet Take 1 tablet by mouth every 6 (six) hours as needed for moderate pain 20 tablet 0    ARIPiprazole (ABILIFY) 10 mg tablet Take 10 mg by mouth daily      aspirin (ECOTRIN LOW STRENGTH) 81 mg EC tablet Take 81 mg by mouth daily      Calcium-Magnesium-Vitamin D (CALCIUM 500 PO) Take by mouth daily       cholecalciferol (VITAMIN D3) 1,000 units tablet Take 1,000 Units by mouth daily      Cyanocobalamin (VITAMIN B12 PO) Take by mouth daily       enoxaparin (LOVENOX) 120 mg/0 8 mL INJECT 0 8 ML (120 MG TOTAL) UNDER THE SKIN DAILY 24 Syringe 3    folic acid (FOLVITE) 1 mg tablet Take 1 tablet (1 mg total) by mouth daily  0    gemfibrozil (LOPID) 600 mg tablet TAKE 1 TABLET BY MOUTH EVERY DAY 90 tablet 1    lactulose 20 g/30 mL Take 45 mL (30 g total) by mouth 2 (two) times a day 300 mL 2    LORazepam (ATIVAN) 0 5 mg tablet Take 1 tablet (0 5 mg total) by mouth every 6 (six) hours as needed for anxiety (or nausea) 36 tablet 1    Multiple Vitamin (MULTIVITAMIN) tablet Take 1 tablet by mouth daily      Myrbetriq 50 MG TB24 TAKE 1 TABLET BY MOUTH EVERY DAY 30 tablet 5    nitrofurantoin (MACROBID) 100 mg capsule Take 1 capsule (100 mg total) by mouth 2 (two) times a day 14 capsule 0    omeprazole (PriLOSEC) 20 mg delayed release capsule Take 20 mg by mouth daily      ondansetron (ZOFRAN) 8 mg tablet Take 1 tablet (8 mg total) by mouth every 8 (eight) hours as needed for nausea or vomiting 30 tablet 1    oxybutynin (DITROPAN XL) 15 MG 24 hr tablet Take 1 tablet (15 mg total) by mouth daily at bedtime 90 tablet 3    phenazopyridine (PYRIDIUM) 200 mg tablet Take 1 tablet (200 mg total) by mouth 3 (three) times a day with meals 10 tablet 0    quinapril (ACCUPRIL) 5 mg tablet TAKE 1 TABLET BY MOUTH EVERYDAY AT BEDTIME 90 tablet 1    rucaparib (RUBRACA) 300 mg tablet Take 600 mg by mouth every 12 (twelve) hours Take with or without food  Do not repeat a vomited dose   saccharomyces boulardii (FLORASTOR) 250 mg capsule Take 1 capsule (250 mg total) by mouth 2 (two) times a day  0    Sodium Chloride Flush (Normal Saline Flush) 0 9 % SOLN       sodium chloride, PF, 0 9 % 10 mL by Intracatheter route daily for 30 doses 10cc syringes 300 mL 3    venlafaxine (EFFEXOR) 75 mg tablet Take 75 mg by mouth 3 (three) times a day         No current facility-administered medications on file prior to visit  Social History     Tobacco Use    Smoking status: Never Smoker    Smokeless tobacco: Never Used   Substance Use Topics    Alcohol use: Not Currently    Drug use: No       Family History   Problem Relation Age of Onset    Other Mother         dyslipidemia    Ovarian cancer Mother 48    Lymphoma Father     Bone cancer Maternal Grandfather     No Known Problems Sister     No Known Problems Maternal Grandmother     Uterine cancer Paternal Grandmother     No Known Problems Paternal Grandfather     No Known Problems Maternal Aunt     No Known Problems Maternal Aunt     No Known Problems Maternal Aunt     No Known Problems Maternal Aunt     No Known Problems Paternal Aunt     No Known Problems Paternal Aunt     No Known Problems Paternal Aunt     No Known Problems Paternal Aunt     No Known Problems Brother     No Known Problems Brother        Review of Systems     As stated in the HPI  All other systems were reviewed and are negative        Scribe Attestation    I,:  [employer] Giovanna Rosado PA-C am acting as a scribe while in the presence of the attending physician :       I,:  Jayne Lara MD personally performed the services described in this documentation    as scribed in my presence :

## 2021-05-27 NOTE — TELEPHONE ENCOUNTER
Pt calling to sched an appt, states she has been dealing with incontinence, was put onto a med and now for the last 2 months she is having trouble urinating   Pt sched for 06/08/21, please advise if pt needs seen sooner

## 2021-05-28 NOTE — PROGRESS NOTES
Outpatient Oncology Nutrition Consult  Type of Consult: Follow Up  Care Location: [facility]    Nutrition Assessment:  Oncology Diagnosis & Treatments:   Endometrial cancer  -S/p surgery 12/19/17    -Recurrence 7/8/19    -S/p chemotherapy (carboplatin/taxol from 7/30/19-1/6/20)  -S/p whole pelvis RT (2/4/20- 4/13/20)  -Disease progression    -ENDOBAR trial started 12/21/20  Oncology History   Endometrial cancer ([facility] [address] )   11/17/2017 Initial Diagnosis    Endometrial cancer ([facility] [address] )     11/17/2017 Biopsy    ENDOMETRIAL BIOPSY: WELL DIFFERENTIATED ENDOMETRIAL ADENOCARCINOMA (FIGO I) WITH FOCALMUCINOUS FEATURES  Part B: ENDOCERVICAL POLYP:BENIGN ENDOCERVICAL      12/19/2017 Surgery    Robotic assisted total laparoscopic hysterectomy with bilateral salpingo-oophorectomy and sentinel bilateral pelvic lymph node dissection  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 (4 4 x 3 2 cm tumor, 9 4/15 4mm invasion, NO LVSI, washings revealed atypical cellular changes)     12/19/2017 Genetic Testing    Morrison testing negative     6/28/2019 Biopsy    A  Breast, Right, US BX Right Breast 1000 4 cmfn:  - Benign breast tissue with focal histiocytic aggregate  See comment   - Negative for atypia and in-situ or invasive carcinoma  7/8/2019 Recurrence    Presented with right lower extremity DVT and CT demonstrating right pelvic sidewall mass with venous, ureteral and nerve compression causing significant neuropathic pain  Biopsy:  Lymph Node, Right pelvic lymph node x3:  - High-grade adenocarcinoma  7/30/2019 - 1/6/2020 Chemotherapy    Taxol 175 mg/m2 and carboplatin AUC 6 every 21 days  Dose was reduced to taxol 135 mg/m2 and carboplatin AUC 5  Completed 6 cycles  Treatment protracted due to multiple hospital admissions       2/24/2020 - 4/13/2020 Radiation    adjuvant external beam radiation therapy to the whole pelvis to 4500 cGy followed by boost to gross disease of an additional 2200 cGy 11/23/2020 Progression    New necrotic adenopathy in the retroperitoneum on CT      12/21/2020 -  Chemotherapy    Began chemotherapy with rucaparib, atezolizumab, and bevacizumab as per [facility] clinical trial        PMH:  RNYGB (2001 at [address])    Past Medical History:   Diagnosis Date    Anemia     Chemotherapy follow-up examination     Depression     Endometrial cancer ([address] ) 12/2017    Hyperglycemia     vx type 2 dm -- last assessed 4/1/14; resolved 11/7/17    Hyperlipidemia     Hypertension     Obesity     last assessed 10/14/17; resolved 11/7/17     Past Surgical History:   Procedure Laterality Date    ABDOMINAL SURGERY      GASTRIC BYPASS    CHOLECYSTECTOMY      at the time of gastric bypass    COLONOSCOPY      CT NEEDLE BIOPSY LYMPH NODE  7/8/2019    FL GUIDED CENTRAL VENOUS ACCESS DEVICE INSERTION  11/12/2019    GASTRIC BYPASS      HYSTERECTOMY Bilateral     total abdominal with salpingo-oophorectomy    IR NEPHROSTOMY TUBE PLACEMENT  7/26/2019    IR NEPHROURETERAL STENT CHECK/CHANGE/REPOSITION  4/6/2021    IR PICC LINE  9/27/2019    IR PORT PLACEMENT  7/26/2019    IR PORT REMOVAL  9/20/2019    OOPHORECTOMY Bilateral     PA INSJ TUNNELED CTR VAD W/SUBQ PORT AGE 5 YR/> Left 11/12/2019    Procedure: INSERTION VENOUS PORT ( PORT-A-CATH) IR;  Surgeon: Rolanda Rankin DO;  [facility]: [facility];  Service: Interventional Radiology    PA LAP, RADICAL HYST W/ TUBE&OV, NODE BX N/A 12/19/2017    Procedure: HYSTERECTOMY LAPAROSCOPIC TOTAL ([address]) W/ ROBOTICS; BILATERAL SALPINGOOPHERECTOMY; LYMPH NODE DISSECTION; lysis of adhesions;  Surgeon: Fariba Jaimes MD;  Location: [facility];  Service: Gynecology Oncology    PA LAP,DIAGNOSTIC ABDOMEN N/A 12/19/2017    Procedure: LAPAROSCOPY DIAGNOSTIC;  Surgeon: Fariba Jaimes MD;  [facility];  Service: Gynecology Oncology    TONSILLECTOMY      US GUIDED BREAST BIOPSY RIGHT COMPLETE Right 6/28/2019       Review of Medications: Vitamins, Supplements and Herbals: yes: Hx RNYGB regimen: Vitamin D, Folic Acid, Align, calcium citrate TID, B12 1000 mcg QD, MVI BID (MVI does not have iron);  Iron 65 mg QD (2 hrs seperate from calcium)    Current Outpatient Medications:     acetaminophen (TYLENOL) 325 mg tablet, Take 2 tablets (650 mg total) by mouth every 6 (six) hours as needed for mild pain, headaches or fever, Disp: 30 tablet, Rfl: 0    acetaminophen-codeine (TYLENOL #3) 300-30 mg per tablet, Take 1 tablet by mouth every 6 (six) hours as needed for moderate pain, Disp: 20 tablet, Rfl: 0    ARIPiprazole (ABILIFY) 10 mg tablet, Take 10 mg by mouth daily, Disp: , Rfl:     aspirin (ECOTRIN LOW STRENGTH) 81 mg EC tablet, Take 81 mg by mouth daily, Disp: , Rfl:     Calcium-Magnesium-Vitamin D (CALCIUM 500 PO), Take by mouth daily , Disp: , Rfl:     cholecalciferol (VITAMIN D3) 1,000 units tablet, Take 1,000 Units by mouth daily, Disp: , Rfl:     Cyanocobalamin (VITAMIN B12 PO), Take by mouth daily , Disp: , Rfl:     enoxaparin (LOVENOX) 120 mg/0 8 mL, INJECT 0 8 ML (120 MG TOTAL) UNDER THE SKIN DAILY, Disp: 24 Syringe, Rfl: 3    folic acid (FOLVITE) 1 mg tablet, Take 1 tablet (1 mg total) by mouth daily, Disp: , Rfl: 0    gemfibrozil (LOPID) 600 mg tablet, TAKE 1 TABLET BY MOUTH EVERY DAY, Disp: 90 tablet, Rfl: 1    lactulose 20 g/30 mL, Take 45 mL (30 g total) by mouth 2 (two) times a day, Disp: 300 mL, Rfl: 2    LORazepam (ATIVAN) 0 5 mg tablet, Take 1 tablet (0 5 mg total) by mouth every 6 (six) hours as needed for anxiety (or nausea), Disp: 36 tablet, Rfl: 1    Multiple Vitamin (MULTIVITAMIN) tablet, Take 1 tablet by mouth daily, Disp: , Rfl:     Myrbetriq 50 MG TB24, TAKE 1 TABLET BY MOUTH EVERY DAY, Disp: 30 tablet, Rfl: 5    nitrofurantoin (MACROBID) 100 mg capsule, Take 1 capsule (100 mg total) by mouth 2 (two) times a day, Disp: 14 capsule, Rfl: 0    omeprazole (PriLOSEC) 20 mg delayed release capsule, Take 20 mg by mouth daily, Disp: , Rfl:     ondansetron (ZOFRAN) 8 mg tablet, Take 1 tablet (8 mg total) by mouth every 8 (eight) hours as needed for nausea or vomiting, Disp: 30 tablet, Rfl: 1    oxybutynin (DITROPAN XL) 15 MG 24 hr tablet, Take 1 tablet (15 mg total) by mouth daily at bedtime, Disp: 90 tablet, Rfl: 3    phenazopyridine (PYRIDIUM) 200 mg tablet, Take 1 tablet (200 mg total) by mouth 3 (three) times a day with meals, Disp: 10 tablet, Rfl: 0    quinapril (ACCUPRIL) 5 mg tablet, TAKE 1 TABLET BY MOUTH EVERYDAY AT BEDTIME, Disp: 90 tablet, Rfl: 1    rucaparib (RUBRACA) 300 mg tablet, Take 600 mg by mouth every 12 (twelve) hours Take with or without food  Do not repeat a vomited dose , Disp: , Rfl:     saccharomyces boulardii (FLORASTOR) 250 mg capsule, Take 1 capsule (250 mg total) by mouth 2 (two) times a day, Disp: , Rfl: 0    Sodium Chloride Flush (Normal Saline Flush) 0 9 % SOLN, , Disp: , Rfl:     sodium chloride, PF, 0 9 %, 10 mL by Intracatheter route daily for 30 doses 10cc syringes, Disp: 300 mL, Rfl: 3    venlafaxine (EFFEXOR) 75 mg tablet, Take 75 mg by mouth 3 (three) times a day  , Disp: , Rfl:   No current facility-administered medications for this visit       Facility-Administered Medications Ordered in Other Visits:     INV atezolizumab (INVESTIGATIONAL) 1,200 mg in sodium chloride 0 9 % 250 mL infusion, 1,200 mg, Intravenous, Once, Jana Shaw MD    INV bevacizumab (INVESTIGATIONAL) 1,200 mg in sodium chloride 0 9 % 52 mL IVPB, 1,200 mg, Intravenous, Once, Jana Shaw MD    sodium chloride 0 9 % infusion, 20 mL/hr, Intravenous, Continuous, Jana Shaw MD    Most Recent Lab Results:  Lab Results   Component Value Date    WBC 5 84 06/04/2021    IRON 24 (L) 10/02/2019    TIBC 138 (L) 10/02/2019    FERRITIN 1,042 (H) 10/02/2019    CHOLESTEROL 178 06/04/2021    CHOL 183 10/27/2017    TRIG 88 11/01/2018    HDL 59 11/01/2018    LDLCALC 116 (H) 11/01/2018    ALT 44 06/04/2021    AST 35 06/04/2021    ALB 3 6 06/04/2021     10/27/2017     05/12/2017    SODIUM 139 06/04/2021    SODIUM 139 05/14/2021    K 4 1 06/04/2021    K 4 0 05/14/2021     (H) 06/04/2021    BUN 18 06/04/2021    BUN 16 05/14/2021    CREATININE 0 93 06/04/2021    CREATININE 1 04 05/14/2021    EGFR 66 06/04/2021    PHOS 3 9 06/04/2021    PHOS 3 6 05/14/2021    GLUCOSE 219 (H) 12/19/2017    POCGLU 97 08/18/2020    GLUF 105 (H) 04/02/2021    GLUF 108 (H) 03/12/2021    GLUC 100 06/04/2021    HGBA1C 5 1 02/03/2020    HGBA1C 6 2 08/28/2019    HGBA1C 5 5 11/01/2018    CALCIUM 9 9 06/04/2021    FOLATE 5 4 10/06/2019    MG 2 2 06/04/2021     Anthropometric Measurements:   Height: 59"  Ht Readings from Last 1 Encounters:   06/04/21 4' 11" (1 499 m)     Wt Readings from Last 20 Encounters:   06/07/21 77 1 kg (170 lb)   06/04/21 78 9 kg (174 lb)   05/26/21 78 kg (172 lb)   05/17/21 80 7 kg (178 lb)   05/14/21 81 6 kg (180 lb)   04/26/21 81 9 kg (180 lb 8 oz)   04/23/21 82 6 kg (182 lb 1 6 oz)   04/05/21 84 1 kg (185 lb 8 oz)   04/02/21 84 1 kg (185 lb 8 oz)   03/15/21 84 8 kg (187 lb)   03/12/21 85 3 kg (188 lb)   02/22/21 86 2 kg (190 lb)   02/03/21 88 5 kg (195 lb)   01/29/21 88 5 kg (195 lb)   01/11/21 88 5 kg (195 lb)   01/08/21 89 4 kg (197 lb)   12/21/20 90 3 kg (199 lb)   12/18/20 90 5 kg (199 lb 8 oz)   12/04/20 88 9 kg (196 lb)   11/25/20 89 1 kg (196 lb 8 oz)     Weight Hx:  · Usual Weight: 270# before RNYGB in 2001; lowest post-op wt: 200#; wt typically fluctuates between 215-230#  · Varian: (2/25/20) 185 6#, (3/3/20) 188 6#, (3/10/20) 187 2#, (3/19/20) 186 2#, (3/24/20) 187#, (3/31/20) 185 4#, (4/10/20) 184  4#   · Home Scale Wts: (2/19/20) 185#, (8/3/20) 194#    Oncology Nutrition-Anthropometrics      Nutrition from 6/7/2021 in [facility] Oncology Dietitian Services Nutrition from 5/17/2021 in [facility] Oncology Dietitian Services   Patient age (years):  63 years  63 years Patient (female) height (in):  61 in  61 in   Current Weight to be used for anthropometric calculations (lbs)  170 lbs  178 lbs   Current Weight to be used for anthropometric calculations (kg)  77 3 kg  80 9 kg   BMI:  34 3  35 9   IBW female:  95 lbs  95 lbs   IBW (kg) female:  43 2 kg  43 2 kg   IBW % (female)  178 9 %  187 4 %   Adjusted BW (female):  113 8 lbs  115 8 lbs   Adjusted BW kg (female):  51 7 kg  52 6 kg   % weight change after 1 month:  (!) -5 6 %  -2 3 %   Weight change after 1 month (lbs)  -10 lbs  -4 1 lbs   % weight change after 3 months:  (!) -9 6 %  -6 3 %   Weight change after 3 months (lbs)  -18 lbs  -12 lbs   % weight change after 6 months:  (!) -13 3 %  -9 4 %   Weight change after 6 months (lbs)  -26 lbs  -18 5 lbs         Nutrition-Focused Physical Findings: none observed     Food/Nutrition-Related History & Client/Social History:    Current Nutrition Impact Symptoms:  [] Nausea  [x] Reduced Appetite - worse  - feels constipation is contributing  -will get "mouth hungry", take a few bites, and not want to eat anymore [] Acid Reflux   [] Vomiting  [x] Unintended Wt Loss - ongoing & significant [] Malabsorption    [] Diarrhea  [] Unintended Wt Gain  [] Dumping Syndrome   [x] Constipation -  Stable  -using Miralax daily and lactulose every other day, now using suppositories  +BM on Saturday  -thinks this is contributing to reduced appetite  -reviewed apple prune sauce recipe and provided recipe again [] Thick Mucous/Secretions  [] Abdominal Pain    [] Dysgeusia (Altered Taste) [x] Xerostomia (Dry Mouth) - states this is "ok" and not affecting po intake  -using Biotene toothpaste and mouthwash  Biotene has been effective    -Uses dry mouth lozenges prn    [] Gas    [] Dysosmia (Altered Smell)  [] Thrush  [] Difficulty Chewing    [] Oral Mucositis (Sore Mouth)  [] Fatigue  [] Other:    [] Odynophagia   [] Esophagitis  [] Other:    [] Dysphagia [] Early Satiety  [] No Problems Eating Food Allergies & Intolerances: yes: had skin patch testing which showed allergy to strawberries, but says she is able to eat strawberries without any side effects    Current Diet: Regular Diet, No Restrictions  Current Nutrition Intake: Decreased since last visit   Appetite: Poor, Forcing self to eat   Nutrition Route: PO  Meal planning/preparation mainly done by: mother does cooking; sister does shopping  Activity level: Mainly sedentary  Wants to start walking outside but limited by pain and medical condition  24 Hr Diet Recall:   Breakfast: 1 egg mixed with veggies and cheese  Snack:  watermelon  Lunch: [employer]: de la garza and cheese baked potato (ate 75%)  Snack: fresh peppers with dill dip  Dinner: [employer] chicken noodle soup  Snack: fresh peppers with ranch dressing and watermelon    Beverages: water (16 9 oz x3), Butch' regular unsweetened tea (32 oz x1), CIB (8 oz occasionally)   -Does not drink coffee or alcohol      -Does not separate fluids from solids, does not have discomfort while eating and drinking at the same time    Supplements:   [employer] Essentials (7dff=971 kcal, 5 g pro) mixed with 2% milk - off/on, inconsistent, forgets (prefers chocolate)    Does not like Ensure or Boost; may be open to making a homemade milkshake      Oncology Nutrition-Estimated Needs      Nutrition from 2020 in [facility] Oncology Dietitian Services   Weight type used  Adjusted weight   Weight in kilograms (kg) used for estimated needs  54 1 kg   Energy needs formula:   25-30 kcal/kg   Energy needs based on 25 kcal/k kcal   Energy needs based on 30 kcal/k kcal   Protein needs formula:  1-1 2 g/kg   Protein needs based on 1 g/kg  54 g   Protein needs based on 1 2 g/k g   Fluid needs formula:  25-30 mL/kg   Fluid needs based on 25 mL/kg  1350 mL   Fluid needs in ounces  46 oz   Fluid needs based on 30 mL/kg  1620 mL   Fluid needs in ounces  55 oz Discussion & Intervention:    Nicky Fallon was evaluated today for an RD follow up regarding wt loss and pt request for dietitian involvement throughout tx  Nicky Fallon is currently undergoing tx for endometrial cancer  She continues to lose significant amounts of wt and is concerned about this  She says she struggled with achieving a healthy weight her whole life and it's hard to shift her mindset  Supportive listening and encouragement were provided  Encouraged her to try to shift her mindset to remember that eating is an important part of tx  Her appetite is poor and she is eating less  She has been inconsistent with oral nutrition supplementation because she forgets  Her constipation is ongoing but stable  She would like to start food journaling and planning her daily meals/snacks to help keep herself accountable  She would also like to follow up next week by phone for more accountability  Reviewed 24 hour recall, which revealed an inadequate po intake, and discussed ways to increase kcal, protein, and fluid intakes and optimize nutrient intake  Also reviewed the importance of wt management throughout the tx process and the role of a high kcal/ high protein diet in managing wt and overall health    Based on today's assessment, discussion included: MNT for: reduced appetite, wt loss, constipation, a high kcal/protein diet & food choices to include at all meals & snacks (Examples of high kcal foods: cheese, full-fat dairy products, nut butter, plant-based fats, coconut oil/milk, avocado, butter, cream soup, etc  Examples of high protein foods: eggs, chicken, fish, beans/legumes, nuts/nut butters, bone broth, etc ) , a post RNY Gastric Bypass diet & food choices to include at all meals & snacks, fortifying foods for added kcal and protein (examples include: adding cheese to foods such as eggs, mashed potatoes, casseroles, etc ; Making oatmeal with whole milk rather than water; Making fortified mashed potatoes with cream, butter, dry milk powder, plain [place] yogurt, and cheese ), adequate hydration & fluid choices, sipping on calorie containing beverages (examples include: adding whole milk or cream to coffee, oral nutrition supplements, juice, electrolyte replacement beverages, milk, etc ), eating smaller more frequent meals every 2-3 hours (5-6 small meals/day), having consistent and planned snacks between meals, eating when feeling most hungry, increasing oral nutrition supplements and recipe suggestions/resources, trying new recipes, & cooking at home more often  Moving forward, Concepción Hinton was encouraged to increase kcal, protein, and fluid intakes  She will start a food journal to help keep herself accountable  Materials Provided: Recipes: Food52 and Daily food journals  All questions and concerns addressed during todays visit  Concepción Hinton has RD contact information  Nutrition Diagnosis:  Inadequate Energy Intake related to physiological causes, disease state and treatment related issues as evidenced by food recall, wt loss and discussion with pt and/or family  Increased Nutrient Needs (kcal & pro) related to increased demand for nutrients and disease state as evidenced by cancer dx and pt undergoing tx for cancer  Altered GI Function related to alteration in GI tract motility as evidenced by Constipation  Patient has clinical indicators (or ASPEN criteria) consistent with severe protein-calorie malnutrition in the context of Chronic Illness as evidenced by >5% wt loss in 1 month and </=75% energy intake vs  Estimated needs for >/=1 month      Monitoring & Evaluation:   Goals:  · weight maintenance/stabilization  · adequate nutrition impact symptom management  · pt to meet >/=75% estimated nutrition needs daily   · Eat 5-6 small meals per day  · 2 oral nutrition supplements per day    · Progress Towards Goals: Not Met    Nutrition Rx & Recommendations:  · Diet: High Calorie, High Protein (for high calorie foods see pages 52-53, and for high protein foods see pages 49-51 in your Eating Hints book)  · Small, frequent meals/snacks may be easier to tolerate than 3 large daily meals  Aim for 5-6 small meals per day (every 2-3 hours)  · Include protein at all meals/snacks  · For appetite loss: try powdered or liquid nutrition supplements; eating by the clock every 2-3 hours; set a timer to remind yourself to eat, keep snacks nearby; add extra protein & calories to your diet; drink liquids in between meals, choose liquids that add calories; eat a bedtime snack; eat soft, cool or frozen foods; eat a larger meal when you feel well & rested; only sip small amounts of liquids during meals (see pages 10-12 in your Eating Hints book)  · For weight loss: monitor your weight at home at least 2x/week, record your weight, start by adding 250-500 extra calories per day, eat 5-6 small scheduled meals every 2-3 hrs, choose foods that are high in protein and calories (see pages 49-53 in your Eating Hints book), drink liquids with calories for example: milkshakes/smoothies/juice/soup/whole milk/chocolate milk, cook with protein fortified milk (see recipe on page 36 in your Eating Hints book), consider ready-to-drink oral nutrition supplements such as Ensure Plus, Ensure Enlive, Boost Plus, or Boost Very High Calorie, avoid "diet" and "light" foods when possible, avoid drinking too much with meals, contact your dietitian with any continued weight loss over the course of 1 week  For more info see pages 35-37 in your Eating Hints book  · For constipation: drink plenty of fluids (>64 oz/day); drink hot liquids; eat high-fiber foods as tolerated (whole grains, beans, peas, nuts, seeds, fruits, vegetables, etc); increase physical activity as tolerated  Avoid increasing fiber intake too quickly, add fiber into your diet slowly; keep a record of your bowel movements (see pages 13-14 in you Eating Hints book)    · Weigh yourself regularly  If you notice weight loss, make an effort to increase your daily food/calorie intake  If you continue to notice loss after these efforts, reach out to your dietitian to establish a plan to stabilize weight  · Fluid Goal: 46-55 oz per day  Aim for at least 55 oz per day while having constipation  · Nutrition Supplements: Total of 2 per day  [place] Instant Breakfast shake made with whole milk as snacks  Try making a homemade milkshake: whole milk, CIB powder, peanut butter, ice cream   · Start keeping a food journal   Plan your meals and snacks ahead of time  · Start Apple Prune Sauce + 8 oz water each night before bed  · Snack ideas: [place] yogurt with fresh fruit, veggies with humus, pretzels with peanut butter, grapes and cheese, watermelon with a cheese stick , etc   · Start weighing yourself once a week at home  · Lifetime vitamin/mineral supplementation recommended for Gastric Bypass Surgery:  · Multivitamin BID  · Iron 45 mg daily  · Vitamin D daily  · Calcium Citrate 500 mg TID (do not take with iron, needs to be 2 hrs apart from iron)  · B12 1000 mcg daily    · Resources for recipes: [employer], cancerdietitian  com, oncGemSharek org/support/nutrition-and-cancer, Skinnytaste  com, www oncologynutrition  org/erfc/recipes, www cookforCapital Alliance Softwareurlife  org, The Essential Cancer Treatment Nutrition Guide and Cookbook: Includes 338 Healthy and Delicious Recipes      Follow Up Plan: 6/14 phone follow up   Recommend Referral to Other Providers: none at this time

## 2021-06-02 DIAGNOSIS — C54.1 ENDOMETRIAL CANCER (HCC): Primary | ICD-10-CM

## 2021-06-03 DIAGNOSIS — C54.1 ENDOMETRIAL CANCER (HCC): Primary | ICD-10-CM

## 2021-06-04 ENCOUNTER — OFFICE VISIT (OUTPATIENT)
Dept: GYNECOLOGIC ONCOLOGY | Facility: CLINIC | Age: 66
End: 2021-06-04
Payer: COMMERCIAL

## 2021-06-04 ENCOUNTER — HOSPITAL ENCOUNTER (OUTPATIENT)
Dept: RADIOLOGY | Facility: HOSPITAL | Age: 66
Discharge: HOME | End: 2021-06-04
Attending: RADIOLOGY | Admitting: RADIOLOGY
Payer: COMMERCIAL

## 2021-06-04 ENCOUNTER — DOCUMENTATION (OUTPATIENT)
Dept: OTHER | Facility: HOSPITAL | Age: 66
End: 2021-06-04

## 2021-06-04 ENCOUNTER — HOSPITAL ENCOUNTER (OUTPATIENT)
Dept: INFUSION CENTER | Facility: HOSPITAL | Age: 66
Discharge: HOME | End: 2021-06-04
Payer: COMMERCIAL

## 2021-06-04 VITALS
TEMPERATURE: 98.4 F | BODY MASS INDEX: 35.08 KG/M2 | DIASTOLIC BLOOD PRESSURE: 76 MMHG | WEIGHT: 174 LBS | HEART RATE: 98 BPM | SYSTOLIC BLOOD PRESSURE: 118 MMHG | TEMPERATURE: 97.8 F | HEIGHT: 59 IN

## 2021-06-04 DIAGNOSIS — Z45.2 ENCOUNTER FOR CENTRAL LINE CARE: Primary | ICD-10-CM

## 2021-06-04 DIAGNOSIS — N13.5 OBSTRUCTION OF RIGHT URETER: Primary | ICD-10-CM

## 2021-06-04 DIAGNOSIS — K59.03 DRUG INDUCED CONSTIPATION: ICD-10-CM

## 2021-06-04 DIAGNOSIS — M89.9 PUBIC BONE PAIN: ICD-10-CM

## 2021-06-04 DIAGNOSIS — C54.1 ENDOMETRIAL CANCER (HCC): Primary | ICD-10-CM

## 2021-06-04 DIAGNOSIS — C54.1 ENDOMETRIAL CANCER (HCC): ICD-10-CM

## 2021-06-04 LAB
ALBUMIN SERPL BCP-MCNC: 3.6 G/DL (ref 3.5–5)
ALP SERPL-CCNC: 164 U/L (ref 46–116)
ALT SERPL W P-5'-P-CCNC: 44 U/L (ref 12–78)
AMYLASE SERPL-CCNC: 31 IU/L (ref 25–115)
ANION GAP SERPL CALCULATED.3IONS-SCNC: 6 MMOL/L (ref 4–13)
APTT PPP: 57 SECONDS (ref 23–37)
AST SERPL W P-5'-P-CCNC: 35 U/L (ref 5–45)
BASOPHILS # BLD AUTO: 0.05 THOUSANDS/ΜL (ref 0–0.1)
BASOPHILS NFR BLD AUTO: 1 % (ref 0–1)
BILIRUB SERPL-MCNC: 0.2 MG/DL (ref 0.2–1)
BUN SERPL-MCNC: 18 MG/DL (ref 5–25)
CALCIUM SERPL-MCNC: 9.9 MG/DL (ref 8.3–10.1)
CHLORIDE SERPL-SCNC: 112 MMOL/L (ref 100–108)
CHOLEST SERPL-MCNC: 178 MG/DL (ref 50–200)
CO2 SERPL-SCNC: 21 MMOL/L (ref 21–32)
CREAT SERPL-MCNC: 0.93 MG/DL (ref 0.6–1.3)
EOSINOPHIL # BLD AUTO: 0.02 THOUSAND/ΜL (ref 0–0.61)
EOSINOPHIL NFR BLD AUTO: 0 % (ref 0–6)
ERYTHROCYTE [DISTWIDTH] IN BLOOD BY AUTOMATED COUNT: 14.6 % (ref 11.6–15.1)
GFR SERPL CREATININE-BSD FRML MDRD: 66 ML/MIN/1.73SQ M
GGT SERPL-CCNC: 12 U/L (ref 5–85)
GLUCOSE SERPL-MCNC: 100 MG/DL (ref 65–140)
HCT VFR BLD AUTO: 31.4 % (ref 34.8–46.1)
HGB BLD-MCNC: 10.2 G/DL (ref 11.5–15.4)
IMM GRANULOCYTES # BLD AUTO: 0.07 THOUSAND/UL (ref 0–0.2)
IMM GRANULOCYTES NFR BLD AUTO: 1 % (ref 0–2)
INR PPP: 1.07 (ref 0.84–1.19)
LIPASE SERPL-CCNC: 137 U/L (ref 73–393)
LYMPHOCYTES # BLD AUTO: 0.81 THOUSANDS/ΜL (ref 0.6–4.47)
LYMPHOCYTES NFR BLD AUTO: 14 % (ref 14–44)
MAGNESIUM SERPL-MCNC: 2.2 MG/DL (ref 1.6–2.6)
MCH RBC QN AUTO: 33 PG (ref 26.8–34.3)
MCHC RBC AUTO-ENTMCNC: 32.5 G/DL (ref 31.4–37.4)
MCV RBC AUTO: 102 FL (ref 82–98)
MONOCYTES # BLD AUTO: 0.7 THOUSAND/ΜL (ref 0.17–1.22)
MONOCYTES NFR BLD AUTO: 12 % (ref 4–12)
NEUTROPHILS # BLD AUTO: 4.19 THOUSANDS/ΜL (ref 1.85–7.62)
NEUTS SEG NFR BLD AUTO: 72 % (ref 43–75)
NRBC BLD AUTO-RTO: 0 /100 WBCS
PHOSPHATE SERPL-MCNC: 3.9 MG/DL (ref 2.3–4.1)
PLATELET # BLD AUTO: 297 THOUSANDS/UL (ref 149–390)
PMV BLD AUTO: 10.5 FL (ref 8.9–12.7)
POTASSIUM SERPL-SCNC: 4.1 MMOL/L (ref 3.5–5.3)
PROT SERPL-MCNC: 8.2 G/DL (ref 6.4–8.2)
PROTHROMBIN TIME: 14 SECONDS (ref 11.6–14.5)
RBC # BLD AUTO: 3.09 MILLION/UL (ref 3.81–5.12)
SODIUM SERPL-SCNC: 139 MMOL/L (ref 136–145)
T3 SERPL-MCNC: 1.2 NG/ML (ref 0.6–1.8)
T4 FREE SERPL-MCNC: 1.08 NG/DL (ref 0.76–1.46)
TSH SERPL DL<=0.05 MIU/L-ACNC: 0.98 UIU/ML (ref 0.36–3.74)
WBC # BLD AUTO: 5.84 THOUSAND/UL (ref 4.31–10.16)

## 2021-06-04 PROCEDURE — 80053 COMPREHEN METABOLIC PANEL: CPT

## 2021-06-04 PROCEDURE — 82465 ASSAY BLD/SERUM CHOLESTEROL: CPT

## 2021-06-04 PROCEDURE — 84443 ASSAY THYROID STIM HORMONE: CPT

## 2021-06-04 PROCEDURE — 84439 ASSAY OF FREE THYROXINE: CPT

## 2021-06-04 PROCEDURE — C2625 STENT, NON-COR, TEM W/DEL SY: HCPCS

## 2021-06-04 PROCEDURE — 83690 ASSAY OF LIPASE: CPT

## 2021-06-04 PROCEDURE — 85025 COMPLETE CBC W/AUTO DIFF WBC: CPT

## 2021-06-04 PROCEDURE — 82150 ASSAY OF AMYLASE: CPT

## 2021-06-04 PROCEDURE — 82977 ASSAY OF GGT: CPT

## 2021-06-04 PROCEDURE — 84480 ASSAY TRIIODOTHYRONINE (T3): CPT

## 2021-06-04 PROCEDURE — 85610 PROTHROMBIN TIME: CPT

## 2021-06-04 PROCEDURE — 83735 ASSAY OF MAGNESIUM: CPT

## 2021-06-04 PROCEDURE — 50387 CHANGE NEPHROURETERAL CATH: CPT

## 2021-06-04 PROCEDURE — 85730 THROMBOPLASTIN TIME PARTIAL: CPT

## 2021-06-04 PROCEDURE — 99214 OFFICE O/P EST MOD 30 MIN: CPT | Performed by: NURSE PRACTITIONER

## 2021-06-04 PROCEDURE — C1769 GUIDE WIRE: HCPCS

## 2021-06-04 PROCEDURE — 50387 CHANGE NEPHROURETERAL CATH: CPT | Performed by: RADIOLOGY

## 2021-06-04 PROCEDURE — 84100 ASSAY OF PHOSPHORUS: CPT

## 2021-06-04 RX ORDER — CIPROFLOXACIN 500 MG/1
500 TABLET, FILM COATED ORAL ONCE
Qty: 1 TABLET | Refills: 0 | Status: SHIPPED | OUTPATIENT
Start: 2021-06-04 | End: 2021-06-04

## 2021-06-04 RX ORDER — CIPROFLOXACIN 250 MG/1
TABLET, FILM COATED ORAL CODE/TRAUMA/SEDATION MEDICATION
Status: COMPLETED | OUTPATIENT
Start: 2021-06-04 | End: 2021-06-04

## 2021-06-04 RX ADMIN — IOHEXOL 20 ML: 350 INJECTION, SOLUTION INTRAVENOUS at 11:48

## 2021-06-04 RX ADMIN — CIPROFLOXACIN HYDROCHLORIDE 500 MG: 250 TABLET, FILM COATED ORAL at 10:05

## 2021-06-04 NOTE — PROGRESS NOTES
Patient was seen in the office for her Cycle 9 Day 1 pre treatment office visit with Harsh Don  Patient AEs and Conmeds were reviewed  Patient denied changes to her conmeds, but reported that she is experiencing consistant constipation with infrequent bowel movements   Patient reported that she has not used the suppository yet but will now   RAFITA Don recommended the use OTC laxative suppositories to help patient with these symptoms again  Patient did discuss weight loss stating that she has been eating yogurt and soups as they are more appealing with her dry mouth    Future appointments were reviewed  Overall, patient states that she is feeling okay, and was told to call with any questions or concerns

## 2021-06-04 NOTE — PROGRESS NOTES
Labs have been reviewed and signed by Dr Dominique  on 6/4/21  Per protocol, patient is ok to proceed with Cycle 9 Day 1 treatment on Monday 6/7/21

## 2021-06-04 NOTE — PLAN OF CARE
Problem: Potential for Falls  Goal: Patient will remain free of falls  Description: INTERVENTIONS:  - Assess patient frequently for physical needs  -  Identify cognitive and physical deficits and behaviors that affect risk of falls    -  [place] fall precautions as indicated by assessment   - Educate patient/family on patient safety including physical limitations  - Instruct patient to call for assistance with activity based on assessment  - Modify environment to reduce risk of injury  - Consider OT/PT consult to assist with strengthening/mobility  Outcome: Progressing

## 2021-06-04 NOTE — DISCHARGE INSTRUCTIONS
Ureteral Stent Placement   WHAT YOU NEED TO KNOW:   Ureteral stent placement is a procedure to open a blocked or narrow ureter  The ureter is the tube that carries urine from your kidney into your bladder  A stent is a thin hollow plastic tube used to hold your ureter open and allow urine to flow  The stent may stay in for several weeks  DISCHARGE INSTRUCTIONS:   Medicines:   · Pain medicine  may be given to take away or decrease pain  Do not wait until the pain is severe before you take your medicine  · Antibiotics  help prevent infections  Your healthcare provider may prescribe these for you while your stent remains in  · Take your medicine as directed  Contact your healthcare provider if you think your medicine is not helping or if you have side effects  Tell him or her if you are allergic to any medicine  Keep a list of the medicines, vitamins, and herbs you take  Include the amounts, and when and why you take them  Bring the list or the pill bottles to follow-up visits  Carry your medicine list with you in case of an emergency  Follow up with your urologist as directed: You will need regular follow-up visits with your urologist as long as the stent remains in  He will check to make sure the stent is working properly  He may do urine cultures to check for infection  Write down your questions so you remember to ask them during your visits  Self-care:   · Drink liquids  as directed  Ask your healthcare provider how much liquid to drink each day and which liquids are best for you  Fluids such as cranberry or apple juice may be especially helpful to prevent urinary infections  · Return to normal activities  the day after your stent placement or as directed by your healthcare provider  · You may take a shower  the day after your stent placement if your healthcare provider says it is okay  Contact your healthcare provider or urologist if:   · You have a fever or chills      · You feel like you need to urinate often  · You have pain when you urinate or pain around your bladder or kidney  · You see blood in your urine or it looks cloudy  · You have questions or concerns about your condition or care  Seek care immediately or call 911 if:   · You urinate little or not at all  · You have severe pain in your abdomen  © 2017 [employer] Information is for End User's use only and may not be sold, redistributed or otherwise used for commercial purposes  All illustrations and images included in CareNotes® are the copyrighted property of [employer]  or [employer]  The above information is an  only  It is not intended as medical advice for individual conditions or treatments  Talk to your doctor, nurse or pharmacist before following any medical regimen to see if it is safe and effective for you

## 2021-06-04 NOTE — PROGRESS NOTES
Assessment/Plan:    Problem List Items Addressed This Visit        Digestive    Drug induced constipation     Stable  Patient uses the lactulose but has not needed to use suppositories as discussed at her last visit  Genitourinary    Endometrial cancer ([facility] [address] ) - Primary (Chronic)     63year-old with recurrent stage IB endometrial cancer who is s/p cycle 9 of treatment on the EndoBARR trial  Continues with constipation, although this has improved with lactulose  Pubic bone pain is stable  She was not able to give a urine sample at the lab today so a cup was provided to her for UA/UPC and she will deliver to the lab over the weekend as with previous cycle  There are otherwise no changes in her current condition  Her performance status is 1  Patient will proceed with treatment on Monday, 6/7/21  Labs reviewed and are WNL for treatment  She will return to the office as per clinical trial scheduling with a pre-visit CT scan  Relevant Orders    Protein / creatinine ratio, urine    Urinalysis with microscopic       Other    Pubic bone pain     Patient has known pubic bone fracture r/t prior RT  Recent xray demonstrates healing  She saw orthopedics on 5/26 and no further ortho intervention was requested  Pain has remained stable  She has not taken any of the Tylenol #3 tabs as prescribed at her last visit  CHIEF COMPLAINT: Pre-chemotherapy evaluation      Problem:  Cancer Staging  Endometrial cancer [facility])  Staging form: Corpus Uteri - Carcinoma, AJCC 7th Edition  - Clinical stage from 12/19/2017: FIGO Stage IB (T1b, N0, M0) - Signed by Liz Johnson MD on 2/12/2018        Previous therapy:  Oncology History   [facility] )   11/17/2017 Initial Diagnosis    [facility]omet[facility] )     11/17/2017 Biopsy    ENDOMETRIAL BIOPSY: WELL DIFFERENTIATED ENDOMETRIAL ADENOCARCINOMA (FIGO I) WITH FOCALMUCINOUS FEATURES      Part B: ENDOCERVICAL POLYP:BENIGN ENDOCERVICAL      12/19/2017 Surgery    Robotic assisted total laparoscopic hysterectomy with bilateral salpingo-oophorectomy and sentinel bilateral pelvic lymph node dissection  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 (4 4 x 3 2 cm tumor, 9 4/15 4mm invasion, NO LVSI, washings revealed atypical cellular changes)     12/19/2017 Genetic Testing    Morrison testing negative     6/28/2019 Biopsy    A  Breast, Right, US BX Right Breast 1000 4 cmfn:  - Benign breast tissue with focal histiocytic aggregate  See comment   - Negative for atypia and in-situ or invasive carcinoma  7/8/2019 Recurrence    Presented with right lower extremity DVT and CT demonstrating right pelvic sidewall mass with venous, ureteral and nerve compression causing significant neuropathic pain  Biopsy:  Lymph Node, Right pelvic lymph node x3:  - High-grade adenocarcinoma  7/30/2019 - 1/6/2020 Chemotherapy    Taxol 175 mg/m2 and carboplatin AUC 6 every 21 days  Dose was reduced to taxol 135 mg/m2 and carboplatin AUC 5  Completed 6 cycles  Treatment protracted due to multiple hospital admissions  2/24/2020 - 4/13/2020 Radiation    adjuvant external beam radiation therapy to the whole pelvis to 4500 cGy followed by boost to gross disease of an additional 2200 cGy     11/23/2020 Progression    New necrotic adenopathy in the retroperitoneum on CT      12/21/2020 -  Chemotherapy    Began chemotherapy with rucaparib, atezolizumab, and bevacizumab as per [facility] clinical trial            Patient ID: Adrian Mcfarland is a 63 y o  female     Patient reports no interval changes since her last visit  Continues with constipation and pelvic pain  These, however, are stable and she has not needed to use suppositories or Tylenol #3 which were suggested/prescribed at her last visit  Appetite is fair  She has lost ~25 lbs since January  Her mouth is dry and she prefers to eat "wet" foods such a soup, yogurt, and Boost shakes   No n/v, vaginal bleeding or discharge, or abdominal pain  She is ambulatory with a cane  The following portions of the patient's history were reviewed and updated as appropriate: allergies, current medications, past family history, past medical history, past social history, past surgical history and problem list     Review of Systems   Constitutional: Positive for fatigue  Negative for activity change, appetite change, chills, fever and unexpected weight change  HENT: Negative for mouth sores  Eyes: Negative  Respiratory: Negative for cough, chest tightness, shortness of breath and wheezing  Cardiovascular: Negative for chest pain, palpitations and leg swelling  Gastrointestinal: Positive for constipation  Negative for abdominal distention, abdominal pain, anal bleeding, blood in stool, diarrhea, nausea and vomiting  Endocrine: Negative  Genitourinary: Positive for pelvic pain  Negative for difficulty urinating, dysuria, flank pain, frequency, hematuria, urgency, vaginal bleeding, vaginal discharge and vaginal pain  Musculoskeletal: Negative for arthralgias and myalgias  Skin: Negative for color change, pallor and rash  Neurological: Negative for dizziness, weakness, numbness and headaches  Hematological: Negative  Psychiatric/Behavioral: The patient is not nervous/anxious          Current Outpatient Medications   Medication Sig Dispense Refill    acetaminophen (TYLENOL) 325 mg tablet Take 2 tablets (650 mg total) by mouth every 6 (six) hours as needed for mild pain, headaches or fever 30 tablet 0    acetaminophen-codeine (TYLENOL #3) 300-30 mg per tablet Take 1 tablet by mouth every 6 (six) hours as needed for moderate pain 20 tablet 0    ARIPiprazole (ABILIFY) 10 mg tablet Take 10 mg by mouth daily      aspirin (ECOTRIN LOW STRENGTH) 81 mg EC tablet Take 81 mg by mouth daily      Calcium-Magnesium-Vitamin D (CALCIUM 500 PO) Take by mouth daily       cholecalciferol (VITAMIN D3) 1,000 units tablet Take 1,000 Units by mouth daily      Cyanocobalamin (VITAMIN B12 PO) Take by mouth daily       enoxaparin (LOVENOX) 120 mg/0 8 mL INJECT 0 8 ML (120 MG TOTAL) UNDER THE SKIN DAILY 24 Syringe 3    folic acid (FOLVITE) 1 mg tablet Take 1 tablet (1 mg total) by mouth daily  0    gemfibrozil (LOPID) 600 mg tablet TAKE 1 TABLET BY MOUTH EVERY DAY 90 tablet 1    lactulose 20 g/30 mL Take 45 mL (30 g total) by mouth 2 (two) times a day 300 mL 2    LORazepam (ATIVAN) 0 5 mg tablet Take 1 tablet (0 5 mg total) by mouth every 6 (six) hours as needed for anxiety (or nausea) 36 tablet 1    Multiple Vitamin (MULTIVITAMIN) tablet Take 1 tablet by mouth daily      Myrbetriq 50 MG TB24 TAKE 1 TABLET BY MOUTH EVERY DAY 30 tablet 5    nitrofurantoin (MACROBID) 100 mg capsule Take 1 capsule (100 mg total) by mouth 2 (two) times a day 14 capsule 0    omeprazole (PriLOSEC) 20 mg delayed release capsule Take 20 mg by mouth daily      ondansetron (ZOFRAN) 8 mg tablet Take 1 tablet (8 mg total) by mouth every 8 (eight) hours as needed for nausea or vomiting 30 tablet 1    oxybutynin (DITROPAN XL) 15 MG 24 hr tablet Take 1 tablet (15 mg total) by mouth daily at bedtime 90 tablet 3    phenazopyridine (PYRIDIUM) 200 mg tablet Take 1 tablet (200 mg total) by mouth 3 (three) times a day with meals 10 tablet 0    quinapril (ACCUPRIL) 5 mg tablet TAKE 1 TABLET BY MOUTH EVERYDAY AT BEDTIME 90 tablet 1    rucaparib (RUBRACA) 300 mg tablet Take 600 mg by mouth every 12 (twelve) hours Take with or without food  Do not repeat a vomited dose        saccharomyces boulardii (FLORASTOR) 250 mg capsule Take 1 capsule (250 mg total) by mouth 2 (two) times a day  0    Sodium Chloride Flush (Normal Saline Flush) 0 9 % SOLN       sodium chloride, PF, 0 9 % 10 mL by Intracatheter route daily for 30 doses 10cc syringes 300 mL 3    venlafaxine (EFFEXOR) 75 mg tablet Take 75 mg by mouth 3 (three) times a day         No current facility-administered medications for this visit  Objective:    Blood pressure 118/76, pulse 98, temperature 98 4 °F (36 9 °C), temperature source Tympanic, height 4' 11" (1 499 m), weight 78 9 kg (174 lb), not currently breastfeeding  Body mass index is 35 14 kg/m²  Body surface area is 1 74 meters squared  Physical Exam  Vitals signs reviewed  Constitutional:       General: She is not in acute distress  Appearance: Normal appearance  She is not ill-appearing  HENT:      Head: Normocephalic and atraumatic  Mouth/Throat:      Mouth: Mucous membranes are moist    Eyes:      General: No scleral icterus  Right eye: No discharge  Left eye: No discharge  Conjunctiva/sclera: Conjunctivae normal    Pulmonary:      Effort: Pulmonary effort is normal    Genitourinary:     Comments: R PCN draining clear yellow urine  Musculoskeletal:      Right lower leg: No edema  Left lower leg: No edema  Skin:     General: Skin is warm and dry  Coloration: Skin is not jaundiced  Findings: No rash  Neurological:      General: No focal deficit present  Mental Status: She is alert and oriented to person, place, and time  Cranial Nerves: No cranial nerve deficit  Sensory: No sensory deficit  Motor: No weakness  Gait: Gait normal    Psychiatric:         Mood and Affect: Mood normal          Behavior: Behavior normal          Thought Content:  Thought content normal          Judgment: Judgment normal          No results found for:   Lab Results   Component Value Date     10/27/2017    K 4 1 06/04/2021     (H) 06/04/2021    CO2 21 06/04/2021    BUN 18 06/04/2021    CREATININE 0 93 06/04/2021    GLUCOSE 219 (H) 12/19/2017    GLUF 105 (H) 04/02/2021    CALCIUM 9 9 06/04/2021    CORRECTEDCA 10 2 (H) 12/09/2019    AST 35 06/04/2021    ALT 44 06/04/2021    ALKPHOS 164 (H) 06/04/2021    PROT 6 9 10/27/2017    BILITOT 0 3 10/27/2017    EGFR 66 06/04/2021     Lab Results   Component Value Date    WBC 5 84 06/04/2021    HGB 10 2 (L) 06/04/2021    HCT 31 4 (L) 06/04/2021     (H) 06/04/2021     06/04/2021     Lab Results   Component Value Date    NEUTROABS 4 19 06/04/2021        Trend:  No results found for:

## 2021-06-04 NOTE — ASSESSMENT & PLAN NOTE
Stable  Patient uses the lactulose but has not needed to use suppositories as discussed at her last visit

## 2021-06-04 NOTE — ASSESSMENT & PLAN NOTE
82-year-old with recurrent stage IB endometrial cancer who is s/p cycle 9 of treatment on the EndoBARR trial  Continues with constipation, although this has improved with lactulose  Pubic bone pain is stable  She was not able to give a urine sample at the lab today so a cup was provided to her for UA/UPC and she will deliver to the lab over the weekend as with previous cycle  There are otherwise no changes in her current condition  Her performance status is 1  Patient will proceed with treatment on Monday, 6/7/21  Labs reviewed and are WNL for treatment  She will return to the office as per clinical trial scheduling with a pre-visit CT scan

## 2021-06-04 NOTE — ASSESSMENT & PLAN NOTE
Patient has known pubic bone fracture r/t prior RT  Recent xray demonstrates healing  She saw orthopedics on 5/26 and no further ortho intervention was requested  Pain has remained stable  She has not taken any of the Tylenol #3 tabs as prescribed at her last visit

## 2021-06-05 ENCOUNTER — APPOINTMENT (OUTPATIENT)
Dept: LAB | Facility: CLINIC | Age: 66
End: 2021-06-05
Payer: COMMERCIAL

## 2021-06-05 DIAGNOSIS — C54.1 ENDOMETRIAL CANCER (HCC): ICD-10-CM

## 2021-06-05 LAB
BACTERIA UR QL AUTO: ABNORMAL /HPF
BILIRUB UR QL STRIP: NEGATIVE
CLARITY UR: CLEAR
COLOR UR: YELLOW
CREAT UR-MCNC: 42 MG/DL
GLUCOSE UR STRIP-MCNC: NEGATIVE MG/DL
HGB UR QL STRIP.AUTO: ABNORMAL
KETONES UR STRIP-MCNC: NEGATIVE MG/DL
LEUKOCYTE ESTERASE UR QL STRIP: ABNORMAL
NITRITE UR QL STRIP: POSITIVE
NON-SQ EPI CELLS URNS QL MICRO: ABNORMAL /HPF
PH UR STRIP.AUTO: 6 [PH]
PROT UR STRIP-MCNC: ABNORMAL MG/DL
PROT UR-MCNC: 74 MG/DL
PROT/CREAT UR: 1.76 MG/G{CREAT} (ref 0–0.1)
RBC #/AREA URNS AUTO: ABNORMAL /HPF
SP GR UR STRIP.AUTO: 1.01 (ref 1–1.03)
UROBILINOGEN UR QL STRIP.AUTO: 0.2 E.U./DL
WBC #/AREA URNS AUTO: ABNORMAL /HPF

## 2021-06-05 PROCEDURE — 81001 URINALYSIS AUTO W/SCOPE: CPT

## 2021-06-05 PROCEDURE — 87077 CULTURE AEROBIC IDENTIFY: CPT | Performed by: NURSE PRACTITIONER

## 2021-06-05 PROCEDURE — 82570 ASSAY OF URINE CREATININE: CPT

## 2021-06-05 PROCEDURE — 84156 ASSAY OF PROTEIN URINE: CPT

## 2021-06-05 PROCEDURE — 87086 URINE CULTURE/COLONY COUNT: CPT | Performed by: NURSE PRACTITIONER

## 2021-06-07 ENCOUNTER — TELEPHONE (OUTPATIENT)
Dept: GYNECOLOGIC ONCOLOGY | Facility: CLINIC | Age: 66
End: 2021-06-07

## 2021-06-07 ENCOUNTER — TELEPHONE (OUTPATIENT)
Dept: SURGICAL ONCOLOGY | Facility: CLINIC | Age: 66
End: 2021-06-07

## 2021-06-07 ENCOUNTER — HOSPITAL ENCOUNTER (OUTPATIENT)
Dept: INFUSION CENTER | Facility: CLINIC | Age: 66
Discharge: HOME | End: 2021-06-07
Payer: COMMERCIAL

## 2021-06-07 ENCOUNTER — NUTRITION (OUTPATIENT)
Dept: NUTRITION | Facility: CLINIC | Age: 66
End: 2021-06-07

## 2021-06-07 ENCOUNTER — DOCUMENTATION (OUTPATIENT)
Dept: OTHER | Facility: HOSPITAL | Age: 66
End: 2021-06-07

## 2021-06-07 VITALS
RESPIRATION RATE: 16 BRPM | WEIGHT: 170 LBS | BODY MASS INDEX: 34.34 KG/M2 | DIASTOLIC BLOOD PRESSURE: 70 MMHG | SYSTOLIC BLOOD PRESSURE: 122 MMHG | HEART RATE: 100 BPM | TEMPERATURE: 96.7 F

## 2021-06-07 DIAGNOSIS — Z71.3 NUTRITIONAL COUNSELING: Primary | ICD-10-CM

## 2021-06-07 DIAGNOSIS — N39.0 URINARY TRACT INFECTION WITHOUT HEMATURIA, SITE UNSPECIFIED: ICD-10-CM

## 2021-06-07 DIAGNOSIS — R06.02 SOB (SHORTNESS OF BREATH) ON EXERTION: Primary | ICD-10-CM

## 2021-06-07 PROCEDURE — 96417 CHEMO IV INFUS EACH ADDL SEQ: CPT

## 2021-06-07 PROCEDURE — J9035Q0 INV BEVACIZUMAB 400MG/16ML 16 ML: Performed by: OBSTETRICS & GYNECOLOGY

## 2021-06-07 PROCEDURE — 96413 CHEMO IV INFUSION 1 HR: CPT

## 2021-06-07 PROCEDURE — J9999Q0 INV ATEZOLIZUMAB 1200MG/20ML 20 ML: Performed by: OBSTETRICS & GYNECOLOGY

## 2021-06-07 RX ORDER — SODIUM CHLORIDE 9 MG/ML
20 INJECTION, SOLUTION INTRAVENOUS CONTINUOUS
Status: DISCONTINUED | OUTPATIENT
Start: 2021-06-07 | End: 2021-06-10 | Stop reason: HOSPADM

## 2021-06-07 RX ORDER — SULFAMETHOXAZOLE AND TRIMETHOPRIM 800; 160 MG/1; MG/1
1 TABLET ORAL EVERY 12 HOURS SCHEDULED
Qty: 14 TABLET | Refills: 0 | Status: SHIPPED | OUTPATIENT
Start: 2021-06-07 | End: 2021-06-09 | Stop reason: ALTCHOICE

## 2021-06-07 RX ADMIN — Medication 1200 MG: at 09:34

## 2021-06-07 RX ADMIN — SODIUM CHLORIDE 20 ML/HR: 0.9 INJECTION, SOLUTION INTRAVENOUS at 08:16

## 2021-06-07 RX ADMIN — Medication 1200 MG: at 08:58

## 2021-06-07 NOTE — PROGRESS NOTES
Outpatient Oncology Nutrition Consult  Type of Consult: Follow Up  Care Location: Telephone Call   Nutrition Assessment:  Oncology Diagnosis & Treatments:   Endometrial cancer  -S/p surgery 12/19/17    -Recurrence 7/8/19    -S/p chemotherapy (carboplatin/taxol from 7/30/19-1/6/20)  -S/p whole pelvis RT (2/4/20- 4/13/20)  -Disease progression    -ENDOBAR trial started 12/21/20  Oncology History   Endometrial cancer ([facility] [address] )   11/17/2017 Initial Diagnosis    Endometrial cancer ([facility] [address] )     11/17/2017 Biopsy    ENDOMETRIAL BIOPSY: WELL DIFFERENTIATED ENDOMETRIAL ADENOCARCINOMA (FIGO I) WITH FOCALMUCINOUS FEATURES  Part B: ENDOCERVICAL POLYP:BENIGN ENDOCERVICAL      12/19/2017 Surgery    Robotic assisted total laparoscopic hysterectomy with bilateral salpingo-oophorectomy and sentinel bilateral pelvic lymph node dissection  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 (4 4 x 3 2 cm tumor, 9 4/15 4mm invasion, NO LVSI, washings revealed atypical cellular changes)     12/19/2017 Genetic Testing    Morrison testing negative     6/28/2019 Biopsy    A  Breast, Right, US BX Right Breast 1000 4 cmfn:  - Benign breast tissue with focal histiocytic aggregate  See comment   - Negative for atypia and in-situ or invasive carcinoma  7/8/2019 Recurrence    Presented with right lower extremity DVT and CT demonstrating right pelvic sidewall mass with venous, ureteral and nerve compression causing significant neuropathic pain  Biopsy:  Lymph Node, Right pelvic lymph node x3:  - High-grade adenocarcinoma  7/30/2019 - 1/6/2020 Chemotherapy    Taxol 175 mg/m2 and carboplatin AUC 6 every 21 days  Dose was reduced to taxol 135 mg/m2 and carboplatin AUC 5  Completed 6 cycles  Treatment protracted due to multiple hospital admissions       2/24/2020 - 4/13/2020 Radiation    adjuvant external beam radiation therapy to the whole pelvis to 4500 cGy followed by boost to gross disease of an additional 2200 cGy 11/23/2020 Progression    New necrotic adenopathy in the retroperitoneum on CT      12/21/2020 -  Chemotherapy    Began chemotherapy with rucaparib, atezolizumab, and bevacizumab as per [facility] clinical trial        PMH:  RNYGB (2001 at [address])    Past Medical History:   Diagnosis Date    Anemia     Chemotherapy follow-up examination     Depression     Endometrial cancer ([address] ) 12/2017    Hyperglycemia     vx type 2 dm -- last assessed 4/1/14; resolved 11/7/17    Hyperlipidemia     Hypertension     Obesity     last assessed 10/14/17; resolved 11/7/17     Past Surgical History:   Procedure Laterality Date    ABDOMINAL SURGERY      GASTRIC BYPASS    CHOLECYSTECTOMY      at the time of gastric bypass    COLONOSCOPY      CT NEEDLE BIOPSY LYMPH NODE  7/8/2019    FL GUIDED CENTRAL VENOUS ACCESS DEVICE INSERTION  11/12/2019    GASTRIC BYPASS      HYSTERECTOMY Bilateral     total abdominal with salpingo-oophorectomy    IR NEPHROSTOMY TUBE PLACEMENT  7/26/2019    IR NEPHROURETERAL STENT CHECK/CHANGE/REPOSITION  4/6/2021    IR NEPHROURETERAL STENT CHECK/CHANGE/REPOSITION  6/4/2021    IR PICC LINE  9/27/2019    IR PORT PLACEMENT  7/26/2019    IR PORT REMOVAL  9/20/2019    OOPHORECTOMY Bilateral     HI INSJ TUNNELED CTR VAD W/SUBQ PORT AGE 5 YR/> Left 11/12/2019    Procedure: INSERTION VENOUS PORT ( PORT-A-CATH) IR;  Surgeon: Dario Libman, DO;  [facility];  Service: Interventional Radiology    HI LAP, RADICAL HYST W/ TUBE&OV, NODE BX N/A 12/19/2017    Procedure: HYSTERECTOMY LAPAROSCOPIC TOTAL ([address]) W/ ROBOTICS; BILATERAL SALPINGOOPHERECTOMY; LYMPH NODE DISSECTION; lysis of adhesions;  Surgeon: Brodie Blood MD;  Location: [facility];  Service: Gynecology Oncology    HI LAP,DIAGNOSTIC ABDOMEN N/A 12/19/2017    Procedure: LAPAROSCOPY DIAGNOSTIC;  Surgeon: Brodie Blood MD;  [facility]: [facility];  Service: Gynecology Oncology    TONSILLECTOMY      US GUIDED BREAST BIOPSY RIGHT COMPLETE Right 6/28/2019       Review of Medications:   Vitamins, Supplements and Herbals: yes: Hx RNYGB regimen: Vitamin D, Folic Acid, Align, calcium citrate TID, B12 1000 mcg QD, MVI BID (MVI does not have iron);  Iron 65 mg QD (2 hrs seperate from calcium)    Current Outpatient Medications:     acetaminophen (TYLENOL) 325 mg tablet, Take 2 tablets (650 mg total) by mouth every 6 (six) hours as needed for mild pain, headaches or fever, Disp: 30 tablet, Rfl: 0    acetaminophen-codeine (TYLENOL #3) 300-30 mg per tablet, Take 1 tablet by mouth every 6 (six) hours as needed for moderate pain, Disp: 20 tablet, Rfl: 0    ARIPiprazole (ABILIFY) 10 mg tablet, Take 10 mg by mouth daily, Disp: , Rfl:     aspirin (ECOTRIN LOW STRENGTH) 81 mg EC tablet, Take 81 mg by mouth daily, Disp: , Rfl:     Calcium-Magnesium-Vitamin D (CALCIUM 500 PO), Take by mouth daily , Disp: , Rfl:     cholecalciferol (VITAMIN D3) 1,000 units tablet, Take 1,000 Units by mouth daily, Disp: , Rfl:     Cyanocobalamin (VITAMIN B12 PO), Take by mouth daily , Disp: , Rfl:     enoxaparin (LOVENOX) 120 mg/0 8 mL, INJECT 0 8 ML (120 MG TOTAL) UNDER THE SKIN DAILY, Disp: 24 Syringe, Rfl: 3    folic acid (FOLVITE) 1 mg tablet, Take 1 tablet (1 mg total) by mouth daily, Disp: , Rfl: 0    gemfibrozil (LOPID) 600 mg tablet, TAKE 1 TABLET BY MOUTH EVERY DAY, Disp: 90 tablet, Rfl: 1    lactulose 20 g/30 mL, Take 45 mL (30 g total) by mouth 2 (two) times a day, Disp: 300 mL, Rfl: 2    LORazepam (ATIVAN) 0 5 mg tablet, Take 1 tablet (0 5 mg total) by mouth every 6 (six) hours as needed for anxiety (or nausea), Disp: 36 tablet, Rfl: 1    Multiple Vitamin (MULTIVITAMIN) tablet, Take 1 tablet by mouth daily, Disp: , Rfl:     Myrbetriq 50 MG TB24, TAKE 1 TABLET BY MOUTH EVERY DAY, Disp: 30 tablet, Rfl: 5    nitrofurantoin (MACROBID) 100 mg capsule, Take 1 capsule (100 mg total) by mouth 2 (two) times a day for 10 days, Disp: 20 capsule, Rfl: 0   omeprazole (PriLOSEC) 20 mg delayed release capsule, Take 20 mg by mouth daily, Disp: , Rfl:     ondansetron (ZOFRAN) 8 mg tablet, Take 1 tablet (8 mg total) by mouth every 8 (eight) hours as needed for nausea or vomiting, Disp: 30 tablet, Rfl: 1    oxybutynin (DITROPAN XL) 15 MG 24 hr tablet, Take 1 tablet (15 mg total) by mouth daily at bedtime, Disp: 90 tablet, Rfl: 3    phenazopyridine (PYRIDIUM) 200 mg tablet, Take 1 tablet (200 mg total) by mouth 3 (three) times a day with meals, Disp: 10 tablet, Rfl: 0    quinapril (ACCUPRIL) 5 mg tablet, TAKE 1 TABLET BY MOUTH EVERYDAY AT BEDTIME, Disp: 90 tablet, Rfl: 1    rucaparib (RUBRACA) 300 mg tablet, Take 600 mg by mouth every 12 (twelve) hours Take with or without food    Do not repeat a vomited dose , Disp: , Rfl:     saccharomyces boulardii (FLORASTOR) 250 mg capsule, Take 1 capsule (250 mg total) by mouth 2 (two) times a day, Disp: , Rfl: 0    Sodium Chloride Flush (Normal Saline Flush) 0 9 % SOLN, , Disp: , Rfl:     sodium chloride, PF, 0 9 %, 10 mL by Intracatheter route daily for 30 doses 10cc syringes, Disp: 300 mL, Rfl: 3    venlafaxine (EFFEXOR) 75 mg tablet, Take 75 mg by mouth 3 (three) times a day  , Disp: , Rfl:     Most Recent Lab Results:  Lab Results   Component Value Date    WBC 5 84 06/04/2021    IRON 24 (L) 10/02/2019    TIBC 138 (L) 10/02/2019    FERRITIN 1,042 (H) 10/02/2019    CHOLESTEROL 178 06/04/2021    CHOL 183 10/27/2017    TRIG 88 11/01/2018    HDL 59 11/01/2018    LDLCALC 116 (H) 11/01/2018    ALT 44 06/04/2021    AST 35 06/04/2021    ALB 3 6 06/04/2021     10/27/2017     05/12/2017    SODIUM 139 06/04/2021    SODIUM 139 05/14/2021    K 4 1 06/04/2021    K 4 0 05/14/2021     (H) 06/04/2021    BUN 18 06/04/2021    BUN 16 05/14/2021    CREATININE 0 93 06/04/2021    CREATININE 1 04 05/14/2021    EGFR 66 06/04/2021    PHOS 3 9 06/04/2021    PHOS 3 6 05/14/2021    GLUCOSE 219 (H) 12/19/2017    POCGLU 97 08/18/2020 GLUF 105 (H) 04/02/2021    GLUF 108 (H) 03/12/2021    GLUC 100 06/04/2021    HGBA1C 5 1 02/03/2020    HGBA1C 6 2 08/28/2019    HGBA1C 5 5 11/01/2018    CALCIUM 9 9 06/04/2021    FOLATE 5 4 10/06/2019    MG 2 2 06/04/2021     Anthropometric Measurements:   Height: 59"  Ht Readings from Last 1 Encounters:   06/08/21 4' 11" (1 499 m)     Wt Readings from Last 20 Encounters:   06/08/21 77 1 kg (170 lb)   06/07/21 77 1 kg (170 lb)   06/04/21 78 9 kg (174 lb)   05/26/21 78 kg (172 lb)   05/17/21 80 7 kg (178 lb)   05/14/21 81 6 kg (180 lb)   04/26/21 81 9 kg (180 lb 8 oz)   04/23/21 82 6 kg (182 lb 1 6 oz)   04/05/21 84 1 kg (185 lb 8 oz)   04/02/21 84 1 kg (185 lb 8 oz)   03/15/21 84 8 kg (187 lb)   03/12/21 85 3 kg (188 lb)   02/22/21 86 2 kg (190 lb)   02/03/21 88 5 kg (195 lb)   01/29/21 88 5 kg (195 lb)   01/11/21 88 5 kg (195 lb)   01/08/21 89 4 kg (197 lb)   12/21/20 90 3 kg (199 lb)   12/18/20 90 5 kg (199 lb 8 oz)   12/04/20 88 9 kg (196 lb)     Weight Hx:  · Usual Weight: 270# before RNYGB in 2001; lowest post-op wt: 200#; wt typically fluctuates between 215-230#  · Varian: (2/25/20) 185 6#, (3/3/20) 188 6#, (3/10/20) 187 2#, (3/19/20) 186 2#, (3/24/20) 187#, (3/31/20) 185 4#, (4/10/20) 184  4#   · Home Scale Wts: (2/19/20) 185#, (8/3/20) 194#  · Comments: No recent wt to calculate changes, pts home scale was not working properly    Oncology Nutrition-Anthropometrics      Nutrition from 6/7/2021 in [place] [address] On[facility] Services Nutrition from 5/17/2021 in [facility] Oncology Dietitian Services   Patient age (years):  63 years  63 years   Patient (female) height (in):  61 in  61 in   Current Weight to be used for anthropometric calculations (lbs)  170 lbs  178 lbs   Current Weight to be used for anthropometric calculations (kg)  77 3 kg  80 9 kg   BMI:  34 3  35 9   IBW female:  95 lbs  95 lbs   IBW (kg) female:  43 2 kg  43 2 kg   IBW % (female)  178 9 %  187 4 % Adjusted BW (female):  113 8 lbs  115 8 lbs   Adjusted BW kg (female):  51 7 kg  52 6 kg   % weight change after 1 month:  (!) -5 6 %  -2 3 %   Weight change after 1 month (lbs)  -10 lbs  -4 1 lbs   % weight change after 3 months:  (!) -9 6 %  -6 3 %   Weight change after 3 months (lbs)  -18 lbs  -12 lbs   % weight change after 6 months:  (!) -13 3 %  -9 4 %   Weight change after 6 months (lbs)  -26 lbs  -18 5 lbs         Nutrition-Focused Physical Findings: n/a due to telephone call     Food/Nutrition-Related History & Client/Social History:    Current Nutrition Impact Symptoms:  [] Nausea  [x] Reduced Appetite - was not good while very constipated, had a good appetite starting yesterday  - feels constipation contributes to reduced appetite  -will get "mouth hungry", take a few bites, and not want to eat anymore [] Acid Reflux   [] Vomiting - x2 last week after eating, was very constipated at these times, none since [x] Unintended Wt Loss - ongoing & significant [] Malabsorption    [] Diarrhea  [] Unintended Wt Gain  [] Dumping Syndrome   [x] Constipation -  Improved as of this weekend, has had a BM daily, feels much better  -using Miralax daily and lactulose  -started apple prune sauce 6/11 - significantly helping per pt [] Thick Mucous/Secretions  [] Abdominal Pain    [] Dysgeusia (Altered Taste) [x] Xerostomia (Dry Mouth) - stable; requesting bs/sw rinse recipe today  -using Biotene toothpaste and mouthwash  Biotene has been effective    -Uses dry mouth lozenges prn    [] Gas    [] Dysosmia (Altered Smell)  [] Chula Glow  [] Difficulty Chewing    [] Oral Mucositis (Sore Mouth)  [] Fatigue  [] Other:    [] Odynophagia   [] Esophagitis  [] Other:    [] Dysphagia [] Early Satiety  [] No Problems Eating      Food Allergies & Intolerances: yes: had skin patch testing which showed allergy to strawberries, but says she is able to eat strawberries without any side effects    Current Diet: Regular Diet, No Restrictions  Current Nutrition Intake: Unchanged from last visit   Appetite: Good starting yesterday   Nutrition Route: PO  Meal planning/preparation mainly done by: mother does cooking; sister does shopping  Activity level: Mainly sedentary  Wants to start walking outside but limited by pain and medical condition  25 Hr Diet Recall: has not been keeping food journal, did not feel well enough  Just bought a magic bullet to make CIB shakes and apple/prune sauce  Breakfast:  asiago cheese bagel with butter  Snack: none  Lunch: 1 meatball with cheese and sauce  Snack: raw peppers with dill dip  Dinner:  cup Manwich with cooked peppers and onions  Snack: watermelon, 1 nectarine, 1 hot dog, more [facility] meat    Beverages: water (16 9 oz x3), Butch' regular unsweetened tea (32 oz x1), CIB (none lately, was not feeling well)   -Does not drink coffee or alcohol      -Does not separate fluids from solids, does not have discomfort while eating and drinking at the same time    Supplements:   [employer] Essentials (8wip=657 kcal, 5 g pro) mixed with 2% milk - off/on, inconsistent, forgets (prefers chocolate)   Does not like Ensure or Boost; may be open to making a homemade milkshake      Oncology Nutrition-Estimated Needs      Nutrition from 2020 in [facility] Oncology Dietitian Services   Weight type used  Adjusted weight   Weight in kilograms (kg) used for estimated needs  54 1 kg   Energy needs formula:   25-30 kcal/kg   Energy needs based on 25 kcal/k kcal   Energy needs based on 30 kcal/k kcal   Protein needs formula:  1-1 2 g/kg   Protein needs based on 1 g/kg  54 g   Protein needs based on 1 2 g/k g   Fluid needs formula:  25-30 mL/kg   Fluid needs based on 25 mL/kg  1350 mL   Fluid needs in ounces  46 oz   Fluid needs based on 30 mL/kg  1620 mL   Fluid needs in ounces  55 oz        Discussion & Intervention:    Nicky Fallon was evaluated today for an RD follow up regarding wt loss and pt request for dietitian involvement throughout tx  Maddie Gold is currently undergoing tx for endometrial cancer  No new wt to evaluate today as pts home scale was not functioning properly  She states she had a very bad week last week and was very constipated and not eating well  She did not keep a food journal or start her CIB shakes because of this  She started the apple/prune sauce on Friday night and has had a BM every day since  She is now describing her appetite as good as of yesterday  Reviewed 24 hour recall, which revealed an inadequate po intake, and discussed ways to increase kcal, protein, and fluid intakes and optimize nutrient intake  Also reviewed the importance of wt management throughout the tx process and the role of a high kcal/ high protein diet in managing wt and overall health    Based on today's assessment, discussion included: MNT for: reduced appetite, wt loss, constipation, a high kcal/protein diet & food choices to include at all meals & snacks (Examples of high kcal foods: cheese, full-fat dairy products, nut butter, plant-based fats, coconut oil/milk, avocado, butter, cream soup, etc  Examples of high protein foods: eggs, chicken, fish, beans/legumes, nuts/nut butters, bone broth, etc ) , a post RNY Gastric Bypass diet & food choices to include at all meals & snacks, fortifying foods for added kcal and protein (examples include: adding cheese to foods such as eggs, mashed potatoes, casseroles, etc ; Making oatmeal with whole milk rather than water; Making fortified mashed potatoes with cream, butter, dry milk powder, plain [place] yogurt, and cheese ), adequate hydration & fluid choices, sipping on calorie containing beverages (examples include: adding whole milk or cream to coffee, oral nutrition supplements, juice, electrolyte replacement beverages, milk, etc ), eating smaller more frequent meals every 2-3 hours (5-6 small meals/day), having consistent and planned snacks between meals, eating when feeling most hungry, increasing oral nutrition supplements and recipe suggestions/resources, trying new recipes, & cooking at home more often  Moving forward, Maddie Gold was encouraged to increase kcal, protein, and fluid intakes  She will start a food journal to help keep herself accountable and increase her CIB shakes to BID  She wishes to follow up in July in order to allow herself enough time to get on some sort of routine with eating  Materials Provided: Recipes: BS/SW rinse  All questions and concerns addressed during todays visit  Maddie Gold has RD contact information  Nutrition Diagnosis:  Inadequate Energy Intake related to physiological causes, disease state and treatment related issues as evidenced by food recall, wt loss and discussion with pt and/or family  Increased Nutrient Needs (kcal & pro) related to increased demand for nutrients and disease state as evidenced by cancer dx and pt undergoing tx for cancer  Altered GI Function related to alteration in GI tract motility as evidenced by Constipation  Patient has clinical indicators (or ASPEN criteria) consistent with severe protein-calorie malnutrition in the context of Chronic Illness as evidenced by >5% wt loss in 1 month and </=75% energy intake vs  Estimated needs for >/=1 month  Monitoring & Evaluation:   Goals:  · weight maintenance/stabilization  · adequate nutrition impact symptom management  · pt to meet >/=75% estimated nutrition needs daily   · Eat 5-6 small meals per day  · 2 oral nutrition supplements per day    · Progress Towards Goals: Not Met    Nutrition Rx & Recommendations:  · Diet: High Calorie, High Protein (for high calorie foods see pages 52-53, and for high protein foods see pages 49-51 in your Eating Hints book)  · Small, frequent meals/snacks may be easier to tolerate than 3 large daily meals    Aim for 5-6 small meals per day (every 2-3 hours)  · Include protein at all meals/snacks  · Follow proper oral care; Try baking soda/salt water rinse recipe (mix 3/4 tsp salt + 1 tsp baking soda + 1 qt water; rinse with plain water after using) in Eating Hints book (pg 18)  Brush your teeth before/after meals & before bed  · For appetite loss: try powdered or liquid nutrition supplements; eating by the clock every 2-3 hours; set a timer to remind yourself to eat, keep snacks nearby; add extra protein & calories to your diet; drink liquids in between meals, choose liquids that add calories; eat a bedtime snack; eat soft, cool or frozen foods; eat a larger meal when you feel well & rested; only sip small amounts of liquids during meals (see pages 10-12 in your Eating Hints book)  · For weight loss: monitor your weight at home at least 2x/week, record your weight, start by adding 250-500 extra calories per day, eat 5-6 small scheduled meals every 2-3 hrs, choose foods that are high in protein and calories (see pages 49-53 in your Eating Hints book), drink liquids with calories for example: milkshakes/smoothies/juice/soup/whole milk/chocolate milk, cook with protein fortified milk (see recipe on page 36 in your Eating Hints book), consider ready-to-drink oral nutrition supplements such as Ensure Plus, Ensure Enlive, Boost Plus, or Boost Very High Calorie, avoid "diet" and "light" foods when possible, avoid drinking too much with meals, contact your dietitian with any continued weight loss over the course of 1 week  For more info see pages 35-37 in your Eating Hints book  · For constipation: drink plenty of fluids (>64 oz/day); drink hot liquids; eat high-fiber foods as tolerated (whole grains, beans, peas, nuts, seeds, fruits, vegetables, etc); increase physical activity as tolerated  Avoid increasing fiber intake too quickly, add fiber into your diet slowly; keep a record of your bowel movements (see pages 13-14 in you Eating Hints book)    · Weigh yourself regularly  If you notice weight loss, make an effort to increase your daily food/calorie intake  If you continue to notice loss after these efforts, reach out to your dietitian to establish a plan to stabilize weight  · Fluid Goal: 46-55 oz per day  Aim for at least 55 oz per day while having constipation  · Nutrition Supplements: Total of 2 per day  [place] Instant Breakfast shake made with whole milk as snacks  Try making a homemade milkshake: whole milk, CIB powder, peanut butter, ice cream   · Start keeping a food journal   Plan your meals and snacks ahead of time  · Continue Apple Prune Sauce + 8 oz water each night before bed  · Try baking soda salt water rinses for dry mouth (the recipe is on page 18 of your Eating Hints book)  · Snack ideas: [place] yogurt with fresh fruit, veggies with humus, pretzels with peanut butter, grapes and cheese, watermelon with a cheese stick , etc   · Start weighing yourself once a week at home  · Lifetime vitamin/mineral supplementation recommended for Gastric Bypass Surgery:  · Multivitamin BID  · Iron 45 mg daily  · Vitamin D daily  · Calcium Citrate 500 mg TID (do not take with iron, needs to be 2 hrs apart from iron)  · B12 1000 mcg daily    · Resources for recipes: [employer], cancerdietitian  com, [employer] org/support/nutrition-and-cancer, [employer], www oncologynutrition  org/erfc/recipes, www cookforyourlife  org, The Essential Cancer Treatment Nutrition Guide and Cookbook: Includes 048 Healthy and Delicious Recipes      Follow Up Plan: 7/19/21 during infusion   Recommend Referral to Other Providers: none at this time

## 2021-06-07 NOTE — PROGRESS NOTES
Patient was seen in [facility] for Cycle 9 Day 1 treatment  Patient was given Cycle 9 Rucaparib pills and drug diary, and patient returned Cycle 8 study pills and drug diary  Patient denied any changes to Conmeds   Patient discussed feeling worsening AE of shortness of breath with exertion and burning with urinination  This was discussed with RAFITA Harris  And she placed and order for an antibiotic (Sulfamethoxazole-trimethoprim 800-160 mg ), she will also place an order for a ct with PE protocol    Patient was told to call with any questions or concerns, Patient tolerated treatment without incident today

## 2021-06-07 NOTE — TELEPHONE ENCOUNTER
Patient has burning with urination  Urine collected over the weekend shows UTI  No culture ordered so this was added on  Will send abx to her pharmacy  Patient also complains of SOB with exertion  In speaking with her, she reports this is not much different from her baseline  I asked her to call the office back with any worsening and we will obtain at CT chest PE study ahead of her scheduled CT c/a/p for 6/21

## 2021-06-07 NOTE — PROGRESS NOTES
06/07/21    Beryle Ducking Pulcini   1955   [id number]     Assessment  1 Right ureteral obstruction s/p right nephrostomy tube  2 OAB  3 Atrophic vaginitis    Discussion/Plan  1 Right ureteral obstruction s/p right nephrostomy tube   Exchanged in Interventional Radiology on 6/4/21  2 Acute UTI   + Urine culture, started on Bactrim 6/7/21    Encouraged hydration with increased water and avoidance of bladder irritants as well as completion of entire course of antibiotics   3 Atrophic vaginitis   Patient deferred exam  We discussed use of Estrace cream, however, patient has history of endometrial cancer undergoing chemotherapy and we discussed that this is a hormone based therapy  Will discuss with patient's [id number] [address] [facility] for further recommendations  Encouraged use of Replens over the counter lubricant and coconut oil in the meantime for relief of irritation and dryness  Patient will return in July 2021 for scheduled follow up with Harsh Li  She wishes to hold this appointment to re-evaluate symptoms  She will trial over the counter lubricants and increase her water intake  All questions answered and she is in agreement of the plan  She will call with issues or concerns  Subjective  HPI   Pattie Knight is a 63 y o  female with a past medical history of right-sided hydronephrosis and ureteral obstruction secondary to endometrial cancer  She is currently receiving chemotherapy and undergoing clinical trial for advancement of disease  She receives 2 systemic IV treatments and one oral treatment  Patient has had a right-sided nephrostomy tube for management of chronic hydronephrosis  Her last nephrostomy tube exchange was performed on 6/4/21  This is managed by interventional radiology  Patient states her nephrostomy tube is currently draining dependently  She has had no episodes of flank pain or gross hematuria  She was prescribed Bactrim for recent urinary tract infection   She began taking Bactrim yesterday 6/7  She remains on oxybutynin and Myrbetriq daily which is managing her episodes of urinary incontinence and urgency  Her biggest complaint in the office today is vaginal irritation, dryness, and discomfort  She denies vaginal discharge  Review of Systems - History obtained from chart review and the patient  General ROS: negative for - chills or fever  Psychological ROS: negative  Ophthalmic ROS: negative  ENT ROS: negative  Allergy and Immunology ROS: negative  Hematological and Lymphatic ROS: negative  Endocrine ROS: negative  Breast ROS: negative for breast lumps  Respiratory ROS: no cough, shortness of breath, or wheezing  Cardiovascular ROS: no chest pain or dyspnea on exertion  Gastrointestinal ROS: no abdominal pain, change in bowel habits, or black or bloody stools  Genito-Urinary ROS: no dysuria, trouble voiding, or hematuria  Musculoskeletal ROS: negative  Neurological ROS: no TIA or stroke symptoms  Dermatological ROS: negative       Objective  Physical Exam  Vitals signs and nursing note reviewed  Constitutional:       General: She is awake  She is not in acute distress  Appearance: Normal appearance  She is well-developed and normal weight  She is not ill-appearing, toxic-appearing or diaphoretic  HENT:      Head: Normocephalic and atraumatic  Neck:      Musculoskeletal: Normal range of motion  Pulmonary:      Effort: Pulmonary effort is normal  No respiratory distress  Abdominal:      Tenderness: There is no right CVA tenderness or left CVA tenderness  Musculoskeletal: Normal range of motion  Skin:     General: Skin is warm and dry  Neurological:      General: No focal deficit present  Mental Status: She is alert and oriented to person, place, and time  Mental status is at baseline     Psychiatric:         Attention and Perception: Attention and perception normal          Mood and Affect: Mood and affect normal          Speech: Speech normal  Behavior: Behavior normal  Behavior is cooperative  Thought Content:  Thought content normal          Cognition and Memory: Cognition normal          Judgment: Judgment normal              Anshul Santos

## 2021-06-07 NOTE — TELEPHONE ENCOUNTER
Patient just picked up her medications from the Pharmacy and had a few questions regarding the med prescribed  Best Number to reach her is  [phone]

## 2021-06-07 NOTE — PROGRESS NOTES
Pt here for Atezolizumab and Avastin infusion  Port accessed and blood return noted with NSS infusing @ [facility]  Pt resting comfortably and has no further questions or concerns at this time  Call bell with in reach

## 2021-06-07 NOTE — PATIENT INSTRUCTIONS
Nutrition Rx & Recommendations:  · Diet: High Calorie, High Protein (for high calorie foods see pages 52-53, and for high protein foods see pages 49-51 in your Eating Hints book)  · Small, frequent meals/snacks may be easier to tolerate than 3 large daily meals  Aim for 5-6 small meals per day (every 2-3 hours)  · Include protein at all meals/snacks  · For appetite loss: try powdered or liquid nutrition supplements; eating by the clock every 2-3 hours; set a timer to remind yourself to eat, keep snacks nearby; add extra protein & calories to your diet; drink liquids in between meals, choose liquids that add calories; eat a bedtime snack; eat soft, cool or frozen foods; eat a larger meal when you feel well & rested; only sip small amounts of liquids during meals (see pages 10-12 in your Eating Hints book)  · For weight loss: monitor your weight at home at least 2x/week, record your weight, start by adding 250-500 extra calories per day, eat 5-6 small scheduled meals every 2-3 hrs, choose foods that are high in protein and calories (see pages 49-53 in your Eating Hints book), drink liquids with calories for example: milkshakes/smoothies/juice/soup/whole milk/chocolate milk, cook with protein fortified milk (see recipe on page 36 in your Eating Hints book), consider ready-to-drink oral nutrition supplements such as Ensure Plus, Ensure Enlive, Boost Plus, or Boost Very High Calorie, avoid "diet" and "light" foods when possible, avoid drinking too much with meals, contact your dietitian with any continued weight loss over the course of 1 week  For more info see pages 35-37 in your Eating Hints book  · For constipation: drink plenty of fluids (>64 oz/day); drink hot liquids; eat high-fiber foods as tolerated (whole grains, beans, peas, nuts, seeds, fruits, vegetables, etc); increase physical activity as tolerated    Avoid increasing fiber intake too quickly, add fiber into your diet slowly; keep a record of your bowel movements (see pages 13-14 in you Eating Hints book)  · Weigh yourself regularly  If you notice weight loss, make an effort to increase your daily food/calorie intake  If you continue to notice loss after these efforts, reach out to your dietitian to establish a plan to stabilize weight  · Fluid Goal: 46-55 oz per day  Aim for at least 55 oz per day while having constipation  · Nutrition Supplements: Total of 2 per day  [place] Instant Breakfast shake made with whole milk as snacks  Try making a homemade milkshake: whole milk, CIB powder, peanut butter, ice cream   · Start keeping a food journal   Plan your meals and snacks ahead of time  · Start Apple Prune Sauce + 8 oz water each night before bed  · Snack ideas: [place] yogurt with fresh fruit, veggies with humus, pretzels with peanut butter, grapes and cheese, watermelon with a cheese stick , etc   · Start weighing yourself once a week at home  · Lifetime vitamin/mineral supplementation recommended for Gastric Bypass Surgery:  · Multivitamin BID  · Iron 45 mg daily  · Vitamin D daily  · Calcium Citrate 500 mg TID (do not take with iron, needs to be 2 hrs apart from iron)  · B12 1000 mcg daily    · Resources for recipes: [employer], cancerdietitian  com, oncolink org/support/nutrition-and-cancer, Skinnytaste  com, www oncologynutrition  org/erfc/recipes, www cookforTheWrap  org, The Essential Cancer Treatment Nutrition Guide and Cookbook: Includes 270 Healthy and Delicious Recipes      Follow Up Plan: 6/14 phone follow up   Recommend Referral to Other Providers: none at this time

## 2021-06-08 ENCOUNTER — OFFICE VISIT (OUTPATIENT)
Dept: UROLOGY | Facility: AMBULATORY SURGERY CENTER | Age: 66
End: 2021-06-08
Payer: COMMERCIAL

## 2021-06-08 VITALS
DIASTOLIC BLOOD PRESSURE: 68 MMHG | HEIGHT: 59 IN | WEIGHT: 170 LBS | HEART RATE: 76 BPM | SYSTOLIC BLOOD PRESSURE: 110 MMHG | BODY MASS INDEX: 34.27 KG/M2

## 2021-06-08 DIAGNOSIS — N39.0 URINARY TRACT INFECTION WITHOUT HEMATURIA, SITE UNSPECIFIED: ICD-10-CM

## 2021-06-08 DIAGNOSIS — N13.5 OBSTRUCTION OF RIGHT URETER: Primary | ICD-10-CM

## 2021-06-08 DIAGNOSIS — N95.2 ATROPHIC VAGINITIS: ICD-10-CM

## 2021-06-08 PROCEDURE — 99213 OFFICE O/P EST LOW 20 MIN: CPT | Performed by: NURSE PRACTITIONER

## 2021-06-08 PROCEDURE — 3008F BODY MASS INDEX DOCD: CPT | Performed by: NURSE PRACTITIONER

## 2021-06-08 PROCEDURE — 1036F TOBACCO NON-USER: CPT | Performed by: NURSE PRACTITIONER

## 2021-06-09 ENCOUNTER — TELEPHONE (OUTPATIENT)
Dept: GYNECOLOGIC ONCOLOGY | Facility: CLINIC | Age: 66
End: 2021-06-09

## 2021-06-09 DIAGNOSIS — N39.0 URINARY TRACT INFECTION WITH HEMATURIA, SITE UNSPECIFIED: ICD-10-CM

## 2021-06-09 DIAGNOSIS — R31.9 URINARY TRACT INFECTION WITH HEMATURIA, SITE UNSPECIFIED: ICD-10-CM

## 2021-06-09 LAB
BACTERIA UR CULT: ABNORMAL
BACTERIA UR CULT: ABNORMAL

## 2021-06-09 RX ORDER — NITROFURANTOIN 25; 75 MG/1; MG/1
100 CAPSULE ORAL 2 TIMES DAILY
Qty: 20 CAPSULE | Refills: 0 | Status: SHIPPED | OUTPATIENT
Start: 2021-06-09 | End: 2021-06-19

## 2021-06-09 NOTE — TELEPHONE ENCOUNTER
Macrobid sent to pharmacy as patient's urine culture shows UTI not susceptible to Bactrim  She is aware

## 2021-06-14 ENCOUNTER — NUTRITION (OUTPATIENT)
Dept: NUTRITION | Facility: CLINIC | Age: 66
End: 2021-06-14

## 2021-06-14 DIAGNOSIS — Z71.3 NUTRITIONAL COUNSELING: Primary | ICD-10-CM

## 2021-06-14 NOTE — PATIENT INSTRUCTIONS
Nutrition Rx & Recommendations:  · Diet: High Calorie, High Protein (for high calorie foods see pages 52-53, and for high protein foods see pages 49-51 in your Eating Hints book)  · Small, frequent meals/snacks may be easier to tolerate than 3 large daily meals  Aim for 5-6 small meals per day (every 2-3 hours)  · Include protein at all meals/snacks  · Follow proper oral care; Try baking soda/salt water rinse recipe (mix 3/4 tsp salt + 1 tsp baking soda + 1 qt water; rinse with plain water after using) in Eating Hints book (pg 18)  Brush your teeth before/after meals & before bed  · For appetite loss: try powdered or liquid nutrition supplements; eating by the clock every 2-3 hours; set a timer to remind yourself to eat, keep snacks nearby; add extra protein & calories to your diet; drink liquids in between meals, choose liquids that add calories; eat a bedtime snack; eat soft, cool or frozen foods; eat a larger meal when you feel well & rested; only sip small amounts of liquids during meals (see pages 10-12 in your Eating Hints book)  · For weight loss: monitor your weight at home at least 2x/week, record your weight, start by adding 250-500 extra calories per day, eat 5-6 small scheduled meals every 2-3 hrs, choose foods that are high in protein and calories (see pages 49-53 in your Eating Hints book), drink liquids with calories for example: milkshakes/smoothies/juice/soup/whole milk/chocolate milk, cook with protein fortified milk (see recipe on page 36 in your Eating Hints book), consider ready-to-drink oral nutrition supplements such as Ensure Plus, Ensure Enlive, Boost Plus, or Boost Very High Calorie, avoid "diet" and "light" foods when possible, avoid drinking too much with meals, contact your dietitian with any continued weight loss over the course of 1 week  For more info see pages 35-37 in your Eating Hints book    · For constipation: drink plenty of fluids (>64 oz/day); drink hot liquids; eat high-fiber foods as tolerated (whole grains, beans, peas, nuts, seeds, fruits, vegetables, etc); increase physical activity as tolerated  Avoid increasing fiber intake too quickly, add fiber into your diet slowly; keep a record of your bowel movements (see pages 13-14 in you Eating Hints book)  · Weigh yourself regularly  If you notice weight loss, make an effort to increase your daily food/calorie intake  If you continue to notice loss after these efforts, reach out to your dietitian to establish a plan to stabilize weight  · Fluid Goal: 46-55 oz per day  Aim for at least 55 oz per day while having constipation  · Nutrition Supplements: Total of 2 per day  [place] Instant Breakfast shake made with whole milk as snacks  Try making a homemade milkshake: whole milk, CIB powder, peanut butter, ice cream   · Start keeping a food journal   Plan your meals and snacks ahead of time  · Continue Apple Prune Sauce + 8 oz water each night before bed  · Try baking soda salt water rinses for dry mouth (the recipe is on page 18 of your Eating Hints book)  · Snack ideas: [place] yogurt with fresh fruit, veggies with humus, pretzels with peanut butter, grapes and cheese, watermelon with a cheese stick , etc   · Start weighing yourself once a week at home  · Lifetime vitamin/mineral supplementation recommended for Gastric Bypass Surgery:  · Multivitamin BID  · Iron 45 mg daily  · Vitamin D daily  · Calcium Citrate 500 mg TID (do not take with iron, needs to be 2 hrs apart from iron)  · B12 1000 mcg daily    · Resources for recipes: [employer], cancerdietitian  com, oncolink org/support/nutrition-and-cancer, [employer], www oncologynutrition  org/erfc/recipes, www cookforyourlife  org, The Essential Cancer Treatment Nutrition Guide and Cookbook: Includes 648 Healthy and Delicious Recipes      Follow Up Plan: 7/19/21 during infusion   Recommend Referral to Other Providers: none at this time

## 2021-06-17 DIAGNOSIS — C54.1 ENDOMETRIAL CANCER (HCC): Primary | ICD-10-CM

## 2021-06-18 ENCOUNTER — HOSPITAL ENCOUNTER (OUTPATIENT)
Dept: NON INVASIVE DIAGNOSTICS | Facility: HOSPITAL | Age: 66
Discharge: HOME | End: 2021-06-18
Attending: OBSTETRICS & GYNECOLOGY
Payer: COMMERCIAL

## 2021-06-18 DIAGNOSIS — C54.1 ENDOMETRIAL CANCER (HCC): ICD-10-CM

## 2021-06-18 LAB
ATRIAL RATE: 68 BPM
P AXIS: 35 DEGREES
PR INTERVAL: 146 MS
QRS AXIS: -27 DEGREES
QRSD INTERVAL: 96 MS
QT INTERVAL: 410 MS
QTC INTERVAL: 435 MS
T WAVE AXIS: 32 DEGREES
VENTRICULAR RATE: 68 BPM

## 2021-06-18 PROCEDURE — 93321 DOPPLER ECHO F-UP/LMTD STD: CPT | Performed by: INTERNAL MEDICINE

## 2021-06-18 PROCEDURE — 93308 TTE F-UP OR LMTD: CPT | Performed by: INTERNAL MEDICINE

## 2021-06-18 PROCEDURE — 93325 DOPPLER ECHO COLOR FLOW MAPG: CPT | Performed by: INTERNAL MEDICINE

## 2021-06-18 PROCEDURE — 93005 ELECTROCARDIOGRAM TRACING: CPT

## 2021-06-18 PROCEDURE — 93010 ELECTROCARDIOGRAM REPORT: CPT | Performed by: INTERNAL MEDICINE

## 2021-06-18 PROCEDURE — 93308 TTE F-UP OR LMTD: CPT

## 2021-06-21 ENCOUNTER — HOSPITAL ENCOUNTER (OUTPATIENT)
Dept: RADIOLOGY | Facility: HOSPITAL | Age: 66
Discharge: HOME | End: 2021-06-21
Attending: OBSTETRICS & GYNECOLOGY
Payer: COMMERCIAL

## 2021-06-21 DIAGNOSIS — C54.1 ENDOMETRIAL CANCER (HCC): ICD-10-CM

## 2021-06-21 PROCEDURE — 74177 CT ABD & PELVIS W/CONTRAST: CPT

## 2021-06-21 PROCEDURE — G1004 CDSM NDSC: HCPCS

## 2021-06-21 PROCEDURE — 71260 CT THORAX DX C+: CPT

## 2021-06-21 RX ADMIN — IOHEXOL 100 ML: 350 INJECTION, SOLUTION INTRAVENOUS at 08:09

## 2021-06-22 ENCOUNTER — VBI (OUTPATIENT)
Dept: ADMINISTRATIVE | Facility: OTHER | Age: 66
End: 2021-06-22

## 2021-06-25 ENCOUNTER — OFFICE VISIT (OUTPATIENT)
Dept: GYNECOLOGIC ONCOLOGY | Facility: CLINIC | Age: 66
End: 2021-06-25
Payer: COMMERCIAL

## 2021-06-25 ENCOUNTER — HOSPITAL ENCOUNTER (OUTPATIENT)
Dept: INFUSION CENTER | Facility: HOSPITAL | Age: 66
Discharge: HOME | End: 2021-06-25
Payer: COMMERCIAL

## 2021-06-25 ENCOUNTER — DOCUMENTATION (OUTPATIENT)
Dept: OTHER | Facility: HOSPITAL | Age: 66
End: 2021-06-25

## 2021-06-25 VITALS
RESPIRATION RATE: 18 BRPM | SYSTOLIC BLOOD PRESSURE: 124 MMHG | HEIGHT: 59 IN | DIASTOLIC BLOOD PRESSURE: 82 MMHG | HEART RATE: 81 BPM | WEIGHT: 171 LBS | BODY MASS INDEX: 34.47 KG/M2

## 2021-06-25 VITALS — TEMPERATURE: 98 F

## 2021-06-25 DIAGNOSIS — Z45.2 ENCOUNTER FOR CENTRAL LINE CARE: ICD-10-CM

## 2021-06-25 DIAGNOSIS — C54.1 ENDOMETRIAL CANCER (HCC): Primary | ICD-10-CM

## 2021-06-25 DIAGNOSIS — C54.1 ENDOMETRIAL CANCER (HCC): Primary | Chronic | ICD-10-CM

## 2021-06-25 DIAGNOSIS — K59.03 DRUG INDUCED CONSTIPATION: ICD-10-CM

## 2021-06-25 DIAGNOSIS — R82.90 ABNORMAL URINE FINDINGS: ICD-10-CM

## 2021-06-25 DIAGNOSIS — I82.4Y1 ACUTE DEEP VEIN THROMBOSIS (DVT) OF PROXIMAL VEIN OF RIGHT LOWER EXTREMITY (HCC): ICD-10-CM

## 2021-06-25 LAB
ALBUMIN SERPL BCP-MCNC: 3.5 G/DL (ref 3.5–5)
ALP SERPL-CCNC: 165 U/L (ref 46–116)
ALT SERPL W P-5'-P-CCNC: 46 U/L (ref 12–78)
AMYLASE SERPL-CCNC: 37 IU/L (ref 25–115)
ANION GAP SERPL CALCULATED.3IONS-SCNC: 10 MMOL/L (ref 4–13)
APTT PPP: 52 SECONDS (ref 23–37)
AST SERPL W P-5'-P-CCNC: 32 U/L (ref 5–45)
BACTERIA UR QL AUTO: ABNORMAL /HPF
BASOPHILS # BLD AUTO: 0.06 THOUSANDS/ΜL (ref 0–0.1)
BASOPHILS NFR BLD AUTO: 1 % (ref 0–1)
BILIRUB SERPL-MCNC: 0.29 MG/DL (ref 0.2–1)
BILIRUB UR QL STRIP: NEGATIVE
BUN SERPL-MCNC: 18 MG/DL (ref 5–25)
CALCIUM SERPL-MCNC: 9.8 MG/DL (ref 8.3–10.1)
CHLORIDE SERPL-SCNC: 105 MMOL/L (ref 100–108)
CHOLEST SERPL-MCNC: 188 MG/DL (ref 50–200)
CLARITY UR: ABNORMAL
CO2 SERPL-SCNC: 19 MMOL/L (ref 21–32)
COLOR UR: YELLOW
CREAT SERPL-MCNC: 1.04 MG/DL (ref 0.6–1.3)
CREAT UR-MCNC: 74.7 MG/DL
EOSINOPHIL # BLD AUTO: 0.04 THOUSAND/ΜL (ref 0–0.61)
EOSINOPHIL NFR BLD AUTO: 1 % (ref 0–6)
ERYTHROCYTE [DISTWIDTH] IN BLOOD BY AUTOMATED COUNT: 14.2 % (ref 11.6–15.1)
GFR SERPL CREATININE-BSD FRML MDRD: 57 ML/MIN/1.73SQ M
GGT SERPL-CCNC: 14 U/L (ref 5–85)
GLUCOSE SERPL-MCNC: 99 MG/DL (ref 65–140)
GLUCOSE UR STRIP-MCNC: NEGATIVE MG/DL
HCT VFR BLD AUTO: 31.4 % (ref 34.8–46.1)
HGB BLD-MCNC: 10.1 G/DL (ref 11.5–15.4)
HGB UR QL STRIP.AUTO: ABNORMAL
IMM GRANULOCYTES # BLD AUTO: 0.09 THOUSAND/UL (ref 0–0.2)
IMM GRANULOCYTES NFR BLD AUTO: 1 % (ref 0–2)
INR PPP: 1.07 (ref 0.84–1.19)
KETONES UR STRIP-MCNC: NEGATIVE MG/DL
LEUKOCYTE ESTERASE UR QL STRIP: ABNORMAL
LIPASE SERPL-CCNC: 173 U/L (ref 73–393)
LYMPHOCYTES # BLD AUTO: 0.93 THOUSANDS/ΜL (ref 0.6–4.47)
LYMPHOCYTES NFR BLD AUTO: 13 % (ref 14–44)
MAGNESIUM SERPL-MCNC: 1.9 MG/DL (ref 1.6–2.6)
MCH RBC QN AUTO: 32.5 PG (ref 26.8–34.3)
MCHC RBC AUTO-ENTMCNC: 32.2 G/DL (ref 31.4–37.4)
MCV RBC AUTO: 101 FL (ref 82–98)
MONOCYTES # BLD AUTO: 0.81 THOUSAND/ΜL (ref 0.17–1.22)
MONOCYTES NFR BLD AUTO: 11 % (ref 4–12)
NEUTROPHILS # BLD AUTO: 5.32 THOUSANDS/ΜL (ref 1.85–7.62)
NEUTS SEG NFR BLD AUTO: 73 % (ref 43–75)
NITRITE UR QL STRIP: NEGATIVE
NON-SQ EPI CELLS URNS QL MICRO: ABNORMAL /HPF
NRBC BLD AUTO-RTO: 0 /100 WBCS
PH UR STRIP.AUTO: 6 [PH]
PHOSPHATE SERPL-MCNC: 3.5 MG/DL (ref 2.3–4.1)
PLATELET # BLD AUTO: 282 THOUSANDS/UL (ref 149–390)
PMV BLD AUTO: 11 FL (ref 8.9–12.7)
POTASSIUM SERPL-SCNC: 4 MMOL/L (ref 3.5–5.3)
PROT SERPL-MCNC: 8.3 G/DL (ref 6.4–8.2)
PROT UR STRIP-MCNC: ABNORMAL MG/DL
PROT UR-MCNC: 97 MG/DL
PROT/CREAT UR: 1.3 MG/G{CREAT} (ref 0–0.1)
PROTHROMBIN TIME: 13.9 SECONDS (ref 11.6–14.5)
RBC # BLD AUTO: 3.11 MILLION/UL (ref 3.81–5.12)
RBC #/AREA URNS AUTO: ABNORMAL /HPF
SODIUM SERPL-SCNC: 134 MMOL/L (ref 136–145)
SP GR UR STRIP.AUTO: 1.01 (ref 1–1.03)
T3 SERPL-MCNC: 1.2 NG/ML (ref 0.6–1.8)
T4 FREE SERPL-MCNC: 0.99 NG/DL (ref 0.76–1.46)
TSH SERPL DL<=0.05 MIU/L-ACNC: 1.79 UIU/ML (ref 0.36–3.74)
UROBILINOGEN UR QL STRIP.AUTO: 1 E.U./DL
WBC # BLD AUTO: 7.25 THOUSAND/UL (ref 4.31–10.16)
WBC #/AREA URNS AUTO: ABNORMAL /HPF

## 2021-06-25 PROCEDURE — 85610 PROTHROMBIN TIME: CPT | Performed by: OBSTETRICS & GYNECOLOGY

## 2021-06-25 PROCEDURE — 84439 ASSAY OF FREE THYROXINE: CPT | Performed by: OBSTETRICS & GYNECOLOGY

## 2021-06-25 PROCEDURE — 84443 ASSAY THYROID STIM HORMONE: CPT | Performed by: OBSTETRICS & GYNECOLOGY

## 2021-06-25 PROCEDURE — 84156 ASSAY OF PROTEIN URINE: CPT

## 2021-06-25 PROCEDURE — 80053 COMPREHEN METABOLIC PANEL: CPT | Performed by: OBSTETRICS & GYNECOLOGY

## 2021-06-25 PROCEDURE — 83690 ASSAY OF LIPASE: CPT | Performed by: OBSTETRICS & GYNECOLOGY

## 2021-06-25 PROCEDURE — 87086 URINE CULTURE/COLONY COUNT: CPT | Performed by: NURSE PRACTITIONER

## 2021-06-25 PROCEDURE — 85025 COMPLETE CBC W/AUTO DIFF WBC: CPT | Performed by: OBSTETRICS & GYNECOLOGY

## 2021-06-25 PROCEDURE — 84480 ASSAY TRIIODOTHYRONINE (T3): CPT | Performed by: OBSTETRICS & GYNECOLOGY

## 2021-06-25 PROCEDURE — 99214 OFFICE O/P EST MOD 30 MIN: CPT | Performed by: NURSE PRACTITIONER

## 2021-06-25 PROCEDURE — 82570 ASSAY OF URINE CREATININE: CPT

## 2021-06-25 PROCEDURE — 82150 ASSAY OF AMYLASE: CPT | Performed by: OBSTETRICS & GYNECOLOGY

## 2021-06-25 PROCEDURE — 85730 THROMBOPLASTIN TIME PARTIAL: CPT | Performed by: OBSTETRICS & GYNECOLOGY

## 2021-06-25 PROCEDURE — 81001 URINALYSIS AUTO W/SCOPE: CPT

## 2021-06-25 PROCEDURE — 82977 ASSAY OF GGT: CPT | Performed by: OBSTETRICS & GYNECOLOGY

## 2021-06-25 PROCEDURE — 82465 ASSAY BLD/SERUM CHOLESTEROL: CPT | Performed by: OBSTETRICS & GYNECOLOGY

## 2021-06-25 PROCEDURE — 83735 ASSAY OF MAGNESIUM: CPT | Performed by: OBSTETRICS & GYNECOLOGY

## 2021-06-25 PROCEDURE — 84100 ASSAY OF PHOSPHORUS: CPT | Performed by: OBSTETRICS & GYNECOLOGY

## 2021-06-25 NOTE — ASSESSMENT & PLAN NOTE
Patient previously had +UTI ~3 weeks ago and was treated first with Bactrim, then with Macrobid x 10 days after culture resulted and showed susceptibility to nitrofurantoin  Today's urine appears turbid per report, with moderate bloo and bacteria, and large leuks  Negative nitrites (previously positive 3 weeks ago)  Patient is asymptomatic  Will not treat at this time  Urine culture added to today's specimen, and message sent to urology CRNP who saw patient on 6/8 for additional input

## 2021-06-25 NOTE — PROGRESS NOTES
Patient was seen in the office for her Cycle 10 Day 1 pre treatment office visit with Harsh Barroso  Patient AEs and Conmeds were reviewed  No changes to Conmeds  Pt's constipation has improved due to recommendations made by Nutrition  Her pelvic discomfort is improved and is being managed by recommendations made by Urology  She still has SOB upon exertion and has trouble taking long walks  Patient has no new AEs to report  She is overall doing well

## 2021-06-25 NOTE — ASSESSMENT & PLAN NOTE
68-year-old with recurrent stage IB endometrial cancer who is s/p cycle 10 of treatment on the EndoBARR trial  CT scan performed 6/21/21 shows stable disease  She has had no interval changes  Her performance status is 1  Labs are WNL for treatment on Monday  She will return to the office as per clinical trial scheduling

## 2021-06-25 NOTE — PROGRESS NOTES
Labs have been reviewed and signed by RAFITA Marion on 6/25/21  Per protocol, patient is ok to proceed with Cycle 10 Day 1 treatment on Monday 6/28/21

## 2021-06-25 NOTE — ASSESSMENT & PLAN NOTE
Patient has introduced a high-fiber diet and takes lactulose EOD  This has been helpful in managing diarrhea

## 2021-06-25 NOTE — PLAN OF CARE
Problem: Potential for Falls  Goal: Patient will remain free of falls  Description: INTERVENTIONS:  - Educate patient/family on patient safety including physical limitations  - Instruct patient to call for assistance with activity   - Consult OT/PT to assist with strengthening/mobility   - Keep Call bell within reach  - Keep bed low and locked with side rails adjusted as appropriate  - Keep care items and personal belongings within reach  - Initiate and maintain comfort rounds  - Make Fall Risk Sign visible to staff    - Apply yellow socks and bracelet for high fall risk patients  - Consider moving patient to room near nurses station  Outcome: Progressing

## 2021-06-25 NOTE — PROGRESS NOTES
Assessment/Plan:    Problem List Items Addressed This Visit        Digestive    Drug induced constipation     Patient has introduced a high-fiber diet and takes lactulose EOD  This has been helpful in managing diarrhea  Cardiovascular and Mediastinum    Acute deep vein thrombosis (DVT) of proximal vein of lower extremity (HCC)     Anticoagulated on Lovenox  Will need treatment for life given active malignancy  Genitourinary    Endometrial cancer ([facility] ) - Primary (Chronic)     63year-old with recurrent stage IB endometrial cancer who is s/p cycle 10 of treatment on the EndoBARR trial  CT scan performed 6/21/21 shows stable disease  She has had no interval changes  Her performance status is 1  Labs are WNL for treatment on Monday  She will return to the office as per clinical trial scheduling  Other    Abnormal urine findings     Patient previously had +UTI ~3 weeks ago and was treated first with Bactrim, then with Macrobid x 10 days after culture resulted and showed susceptibility to nitrofurantoin  Today's urine appears turbid per report, with moderate bloo and bacteria, and large leuks  Negative nitrites (previously positive 3 weeks ago)  Patient is asymptomatic  Will not treat at this time  Urine culture added to today's specimen, and message sent to urology CRNP who saw patient on 6/8 for additional input  Relevant Orders    Urine culture            CHIEF COMPLAINT:       Problem:  Cancer Staging  Endometrial cancer ([place] [id number] )  Staging form: Corpus Uteri - Carcinoma, AJCC 7th Edition  - Clinical stage from 12/19/2017: FIGO Stage IB (T1b, N0, M0) - Signed by Roxanna Cabrera MD on 2/12/2018        Previous therapy:  Oncology History   [facility] )   11/17/2017 Initial Diagnosis    [facility]ometrial [facility] ([facility] )     11/17/2017 Biopsy    ENDOMETRIAL BIOPSY: WELL DIFFERENTIATED ENDOMETRIAL ADENOCARCINOMA (FIGO I) WITH FOCALMUCINOUS FEATURES      Part B: ENDOCERVICAL POLYP:BENIGN ENDOCERVICAL      12/19/2017 Surgery    Robotic assisted total laparoscopic hysterectomy with bilateral salpingo-oophorectomy and sentinel bilateral pelvic lymph node dissection  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 (4 4 x 3 2 cm tumor, 9 4/15 4mm invasion, NO LVSI, washings revealed atypical cellular changes)     12/19/2017 Genetic Testing    Morrison testing negative     6/28/2019 Biopsy    A  Breast, Right, US BX Right Breast 1000 4 cmfn:  - Benign breast tissue with focal histiocytic aggregate  See comment   - Negative for atypia and in-situ or invasive carcinoma  7/8/2019 Recurrence    Presented with right lower extremity DVT and CT demonstrating right pelvic sidewall mass with venous, ureteral and nerve compression causing significant neuropathic pain  Biopsy:  Lymph Node, Right pelvic lymph node x3:  - High-grade adenocarcinoma  7/30/2019 - 1/6/2020 Chemotherapy    Taxol 175 mg/m2 and carboplatin AUC 6 every 21 days  Dose was reduced to taxol 135 mg/m2 and carboplatin AUC 5  Completed 6 cycles  Treatment protracted due to multiple hospital admissions  2/24/2020 - 4/13/2020 Radiation    adjuvant external beam radiation therapy to the whole pelvis to 4500 cGy followed by boost to gross disease of an additional 2200 cGy     11/23/2020 Progression    New necrotic adenopathy in the retroperitoneum on CT      12/21/2020 -  Chemotherapy    Began chemotherapy with rucaparib, atezolizumab, and bevacizumab as per [facility] clinical trial            Patient ID: Caryn Huang is a 63 y o  female     Patient has no interval concerns since her last visit  Constipation has improved  Pain has improved  She saw urology who suggested atrophy as a cause for her discomfort which she now says is more in the vulvar area than pelvic  Has urinary leakage  Plans to use Aquaphor ointment as barrier cream  No fevers, nausea, or vomiting  Appetite is adequate   She has no bleeding or discharge  Reports +ARORA but this is unchanged  No bloating or abdominal pain  She is ambulatory with a cane  The following portions of the patient's history were reviewed and updated as appropriate: allergies, current medications, past family history, past medical history, past social history, past surgical history and problem list     Review of Systems   Constitutional: Positive for fatigue  Negative for activity change, appetite change, chills, fever and unexpected weight change  HENT: Negative for mouth sores  Eyes: Negative  Respiratory: Positive for shortness of breath  Negative for cough, chest tightness and wheezing  SOB on exertion, especially when walking long distances   Cardiovascular: Negative for chest pain, palpitations and leg swelling  Gastrointestinal: Negative for abdominal distention, abdominal pain, anal bleeding, blood in stool, constipation, diarrhea, nausea and vomiting  Endocrine: Negative  Genitourinary: Negative for difficulty urinating, dysuria, flank pain, frequency, hematuria, pelvic pain, urgency, vaginal bleeding, vaginal discharge and vaginal pain  Musculoskeletal: Positive for arthralgias and gait problem  Negative for myalgias  Skin: Negative for color change, pallor and rash  Neurological: Negative for dizziness, weakness, numbness and headaches  Hematological: Negative  Psychiatric/Behavioral: The patient is not nervous/anxious          Current Outpatient Medications   Medication Sig Dispense Refill    acetaminophen (TYLENOL) 325 mg tablet Take 2 tablets (650 mg total) by mouth every 6 (six) hours as needed for mild pain, headaches or fever 30 tablet 0    acetaminophen-codeine (TYLENOL #3) 300-30 mg per tablet Take 1 tablet by mouth every 6 (six) hours as needed for moderate pain 20 tablet 0    ARIPiprazole (ABILIFY) 10 mg tablet Take 10 mg by mouth daily      aspirin (ECOTRIN LOW STRENGTH) 81 mg EC tablet Take 81 mg by mouth daily      Calcium-Magnesium-Vitamin D (CALCIUM 500 PO) Take by mouth daily       cholecalciferol (VITAMIN D3) 1,000 units tablet Take 1,000 Units by mouth daily      Cyanocobalamin (VITAMIN B12 PO) Take by mouth daily       enoxaparin (LOVENOX) 120 mg/0 8 mL INJECT 0 8 ML (120 MG TOTAL) UNDER THE SKIN DAILY 24 Syringe 3    folic acid (FOLVITE) 1 mg tablet Take 1 tablet (1 mg total) by mouth daily  0    gemfibrozil (LOPID) 600 mg tablet TAKE 1 TABLET BY MOUTH EVERY DAY 90 tablet 1    lactulose 20 g/30 mL Take 45 mL (30 g total) by mouth 2 (two) times a day 300 mL 2    LORazepam (ATIVAN) 0 5 mg tablet Take 1 tablet (0 5 mg total) by mouth every 6 (six) hours as needed for anxiety (or nausea) 36 tablet 1    Multiple Vitamin (MULTIVITAMIN) tablet Take 1 tablet by mouth daily      Myrbetriq 50 MG TB24 TAKE 1 TABLET BY MOUTH EVERY DAY 30 tablet 5    omeprazole (PriLOSEC) 20 mg delayed release capsule Take 20 mg by mouth daily      ondansetron (ZOFRAN) 8 mg tablet Take 1 tablet (8 mg total) by mouth every 8 (eight) hours as needed for nausea or vomiting 30 tablet 1    oxybutynin (DITROPAN XL) 15 MG 24 hr tablet Take 1 tablet (15 mg total) by mouth daily at bedtime 90 tablet 3    phenazopyridine (PYRIDIUM) 200 mg tablet Take 1 tablet (200 mg total) by mouth 3 (three) times a day with meals 10 tablet 0    quinapril (ACCUPRIL) 5 mg tablet TAKE 1 TABLET BY MOUTH EVERYDAY AT BEDTIME 90 tablet 1    rucaparib (RUBRACA) 300 mg tablet Take 600 mg by mouth every 12 (twelve) hours Take with or without food  Do not repeat a vomited dose        saccharomyces boulardii (FLORASTOR) 250 mg capsule Take 1 capsule (250 mg total) by mouth 2 (two) times a day  0    Sodium Chloride Flush (Normal Saline Flush) 0 9 % SOLN       sodium chloride, PF, 0 9 % 10 mL by Intracatheter route daily for 30 doses 10cc syringes 300 mL 3    venlafaxine (EFFEXOR) 75 mg tablet Take 75 mg by mouth 3 (three) times a day         No current facility-administered medications for this visit  Objective:    Blood pressure 124/82, pulse 81, resp  rate 18, height 4' 11" (1 499 m), weight 77 6 kg (171 lb), not currently breastfeeding  Body mass index is 34 54 kg/m²  Body surface area is 1 73 meters squared  Physical Exam  Vitals reviewed  Constitutional:       General: She is not in acute distress  Appearance: Normal appearance  She is not ill-appearing  HENT:      Head: Normocephalic and atraumatic  Mouth/Throat:      Mouth: Mucous membranes are moist    Eyes:      General: No scleral icterus  Right eye: No discharge  Left eye: No discharge  Conjunctiva/sclera: Conjunctivae normal    Pulmonary:      Effort: Pulmonary effort is normal    Musculoskeletal:      Right lower leg: No edema  Left lower leg: No edema  Comments: Ambulates with cane   Skin:     General: Skin is warm and dry  Coloration: Skin is not jaundiced  Findings: No rash  Neurological:      General: No focal deficit present  Mental Status: She is alert and oriented to person, place, and time  Cranial Nerves: No cranial nerve deficit  Sensory: No sensory deficit  Motor: No weakness  Gait: Gait normal    Psychiatric:         Mood and Affect: Mood normal          Behavior: Behavior normal          Thought Content:  Thought content normal          Judgment: Judgment normal          No results found for:   Lab Results   Component Value Date     10/27/2017    K 4 0 06/25/2021     06/25/2021    CO2 19 (L) 06/25/2021    BUN 18 06/25/2021    CREATININE 1 04 06/25/2021    GLUCOSE 219 (H) 12/19/2017    GLUF 105 (H) 04/02/2021    CALCIUM 9 8 06/25/2021    CORRECTEDCA 10 2 (H) 12/09/2019    AST 32 06/25/2021    ALT 46 06/25/2021    ALKPHOS 165 (H) 06/25/2021    PROT 6 9 10/27/2017    BILITOT 0 3 10/27/2017    EGFR 57 06/25/2021     Lab Results   Component Value Date    WBC 7 25 06/25/2021    HGB 10 1 (L) 06/25/2021    HCT 31 4 (L) 06/25/2021     (H) 06/25/2021     06/25/2021     Lab Results   Component Value Date    NEUTROABS 5 32 06/25/2021        Trend:  No results found for:

## 2021-06-26 LAB — BACTERIA UR CULT: NORMAL

## 2021-06-28 ENCOUNTER — DOCUMENTATION (OUTPATIENT)
Dept: OTHER | Facility: HOSPITAL | Age: 66
End: 2021-06-28

## 2021-06-28 ENCOUNTER — HOSPITAL ENCOUNTER (OUTPATIENT)
Dept: INFUSION CENTER | Facility: CLINIC | Age: 66
Discharge: HOME | End: 2021-06-28
Payer: COMMERCIAL

## 2021-06-28 VITALS
BODY MASS INDEX: 33.77 KG/M2 | OXYGEN SATURATION: 96 % | HEART RATE: 84 BPM | HEIGHT: 59 IN | DIASTOLIC BLOOD PRESSURE: 76 MMHG | TEMPERATURE: 97.5 F | WEIGHT: 167.5 LBS | SYSTOLIC BLOOD PRESSURE: 126 MMHG | RESPIRATION RATE: 16 BRPM

## 2021-06-28 DIAGNOSIS — Z45.2 ENCOUNTER FOR CENTRAL LINE CARE: Primary | ICD-10-CM

## 2021-06-28 DIAGNOSIS — C54.1 ENDOMETRIAL CANCER (HCC): ICD-10-CM

## 2021-06-28 PROCEDURE — J9035Q0 INV BEVACIZUMAB 400MG/16ML 16 ML: Performed by: OBSTETRICS & GYNECOLOGY

## 2021-06-28 PROCEDURE — 96413 CHEMO IV INFUSION 1 HR: CPT

## 2021-06-28 PROCEDURE — 96417 CHEMO IV INFUS EACH ADDL SEQ: CPT

## 2021-06-28 PROCEDURE — J9999Q0 INV ATEZOLIZUMAB 1200MG/20ML 20 ML: Performed by: OBSTETRICS & GYNECOLOGY

## 2021-06-28 RX ADMIN — Medication 1200 MG: at 08:52

## 2021-06-28 RX ADMIN — Medication 1105 MG: at 09:32

## 2021-06-28 NOTE — PROGRESS NOTES
Patient was seen in [address] [place] [address] for Cycle 10 Day 1 treatment  Patient was given Cycle 10 Rucaparib pills and drug diary, and patient returned Cycle 9 study pills, but did note that she was short one bottle as this was accidentally thrown away by mistake, and drug diary  Patient denied any changes to Conmeds and AE'S  Patient was told to call with any questions or concerns, Patient tolerated treatment without incident today

## 2021-06-28 NOTE — PROGRESS NOTES
Patient is here for chemo  She offers no complaints at this time   Labs reviewed and were signed by Rafal Fuller

## 2021-07-06 ENCOUNTER — OFFICE VISIT (OUTPATIENT)
Dept: UROLOGY | Facility: AMBULATORY SURGERY CENTER | Age: 66
End: 2021-07-06
Payer: COMMERCIAL

## 2021-07-06 VITALS
SYSTOLIC BLOOD PRESSURE: 120 MMHG | BODY MASS INDEX: 33.67 KG/M2 | DIASTOLIC BLOOD PRESSURE: 84 MMHG | HEART RATE: 74 BPM | WEIGHT: 167 LBS | HEIGHT: 59 IN

## 2021-07-06 DIAGNOSIS — R30.0 DYSURIA: ICD-10-CM

## 2021-07-06 DIAGNOSIS — N32.81 OAB (OVERACTIVE BLADDER): Primary | ICD-10-CM

## 2021-07-06 DIAGNOSIS — R30.0 BURNING WITH URINATION: ICD-10-CM

## 2021-07-06 PROCEDURE — 99214 OFFICE O/P EST MOD 30 MIN: CPT | Performed by: NURSE PRACTITIONER

## 2021-07-06 RX ORDER — PHENAZOPYRIDINE HYDROCHLORIDE 200 MG/1
200 TABLET, FILM COATED ORAL DAILY PRN
Qty: 30 TABLET | Refills: 0 | Status: SHIPPED | OUTPATIENT
Start: 2021-07-06 | End: 2022-06-22 | Stop reason: SDUPTHER

## 2021-07-06 NOTE — PATIENT INSTRUCTIONS
Resume Miralax  Urine testing to be performed at your availability  Will start antibiotic if positive for infection  Continue Oxybutynin and Myrbetriq  Call the office for concerns or questions

## 2021-07-06 NOTE — PROGRESS NOTES
7/6/2021      Chief Complaint   Patient presents with    Follow-up    obstruction of ureter    overactive bladder    Hydronephrosis     Assessment and Plan    63 y o  female managed by Dr Law Mills; previously Dr Tima Lara  1   Hydronephrosis secondary to right ureteral obstruction  ·  status post insertion nephrostomy tube -exchanged in intervention Radiology 06/04/2021  ·  next exchange due 09/2021  ·  nephrostomy tube insertion site unremarkable    2  Atrophic vaginitis  ·  continue with follow-up with OBGYVinh Mcclain for for the addition    3  Overactive bladder symptoms  ·  continue with oxybutynin and Myrbetriq  ·   Maintain adequate hydration upwards to 40 oz of water intake per day  ·  avoid bladder irritating foods and beverages  ·  Ensure complete bladder emptying with urination    4  Constipation  · Recommend MiraLax  ·  maintain adequate hydration upwards to 40 oz of water intake per day  ·  balanced diet    5  Dysuria  · Urine testing  ·  will call patient with results of urine testing, if warranted antibiotics will be initiated    History of Present Illness  Drew Feliciano is a 63 y o  female here for follow up evaluation of hydronephrosis secondary to endometrial cancer  She is undergoing chemotherapy and clinical trials for advancement of disease  She does have a nephrostomy tube placed by intervention Radiology 06/04/2021  A prior office evaluation approximately 3 and half weeks ago patient was noted to have an acute urinary tract infection and was started on Bactrim  Follow up urine culture performed 06/25/2021 reveals resolution of urinary tract infection  Review of Systems   Constitutional: Negative for chills and fever  Respiratory: Negative for cough and shortness of breath  Cardiovascular: Negative for chest pain  Gastrointestinal: Negative for abdominal distention, abdominal pain, blood in stool, nausea and vomiting  Genitourinary: Positive for dysuria  Negative for difficulty urinating, enuresis, flank pain, frequency, hematuria and urgency  Skin: Negative for rash       Past Medical History  Past Medical History:   Diagnosis Date    Anemia     Chemotherapy follow-up examination     Depression     Endometrial cancer ([facility] ) 12/2017    Hyperglycemia     vx type 2 dm -- last assessed 4/1/14; resolved 11/7/17    Hyperlipidemia     Hypertension     Obesity     last assessed 10/14/17; resolved 11/7/17       Past Social History  Past Surgical History:   Procedure Laterality Date    ABDOMINAL SURGERY      GASTRIC BYPASS    CHOLECYSTECTOMY      at the time of gastric bypass    COLONOSCOPY      CT NEEDLE BIOPSY LYMPH NODE  7/8/2019    FL GUIDED CENTRAL VENOUS ACCESS DEVICE INSERTION  11/12/2019    GASTRIC BYPASS      HYSTERECTOMY Bilateral     total abdominal with salpingo-oophorectomy    IR NEPHROSTOMY TUBE PLACEMENT  7/26/2019    IR NEPHROURETERAL STENT CHECK/CHANGE/REPOSITION  4/6/2021    IR NEPHROURETERAL STENT CHECK/CHANGE/REPOSITION  6/4/2021    IR PICC LINE  9/27/2019    IR PORT PLACEMENT  7/26/2019    IR PORT REMOVAL  9/20/2019    OOPHORECTOMY Bilateral     NH INSJ TUNNELED CTR VAD W/SUBQ PORT AGE 5 YR/> Left 11/12/2019    Procedure: INSERTION VENOUS PORT ( PORT-A-CATH) IR;  Surgeon: Mortimer Burkes, DO;  [facility];  Service: Interventional Radiology    NH LAP, RADICAL HYST W/ TUBE&OV, NODE BX N/A 12/19/2017    Procedure: HYSTERECTOMY LAPAROSCOPIC TOTAL ([address] [place] [address]) W/ ROBOTICS; BILATERAL SALPINGOOPHERECTOMY; LYMPH NODE DISSECTION; lysis of adhesions;  Surgeon: Simón Garcia MD;  [facility]: [facility];  Service: Gynecology Oncology    NH LAP,DIAGNOSTIC ABDOMEN N/A 12/19/2017    Procedure: LAPAROSCOPY DIAGNOSTIC;  Surgeon: Simón Garcia MD;  [facility];  Service: Gynecology Oncology    TONSILLECTOMY      US GUIDED BREAST BIOPSY RIGHT COMPLETE Right 6/28/2019     Social History     Tobacco Use   Smoking Status Never Smoker   Smokeless Tobacco Never Used       Past Family History  Family History   Problem Relation Age of Onset    Other Mother         dyslipidemia    Ovarian cancer Mother 48    Lymphoma Father     Bone cancer Maternal Grandfather     No Known Problems Sister     No Known Problems Maternal Grandmother     Uterine cancer Paternal Grandmother     No Known Problems Paternal Grandfather     No Known Problems Maternal Aunt     No Known Problems Maternal Aunt     No Known Problems Maternal Aunt     No Known Problems Maternal Aunt     No Known Problems Paternal Aunt     No Known Problems Paternal Aunt     No Known Problems Paternal Aunt     No Known Problems Paternal Aunt     No Known Problems Brother     No Known Problems Brother        Past Social history  Social History     Socioeconomic History    Marital status: Single     Spouse name: Not on file    Number of children: Not on file    Years of education: Not on file    Highest education level: Not on file   Occupational History    Not on file   Tobacco Use    Smoking status: Never Smoker    Smokeless tobacco: Never Used   Vaping Use    Vaping Use: Never used   Substance and Sexual Activity    Alcohol use: Not Currently    Drug use: No    Sexual activity: Not Currently   Other Topics Concern    Not on file   Social History Narrative    Not on file     Social Determinants of Health     Financial Resource Strain:     Difficulty of Paying Living Expenses:    Food Insecurity:     Worried About Running Out of Food in the Last Year:     Ran Out of Food in the Last Year:    Transportation Needs:     Lack of Transportation (Medical):      Lack of Transportation (Non-Medical):    Physical Activity:     Days of Exercise per Week:     Minutes of Exercise per Session:    Stress:     Feeling of Stress :    Social Connections:     Frequency of Communication with Friends and Family:     Frequency of Social Gatherings with Friends and Family:  Attends Yaz[employer] Services:     Active Member of Clubs or Organizations:     Attends Club or Organization Meetings:     Marital Status:    Intimate Partner Violence:     Fear of Current or Ex-Partner:     Emotionally Abused:     Physically Abused:     Sexually Abused:        Current Medications  Current Outpatient Medications   Medication Sig Dispense Refill    acetaminophen (TYLENOL) 325 mg tablet Take 2 tablets (650 mg total) by mouth every 6 (six) hours as needed for mild pain, headaches or fever 30 tablet 0    acetaminophen-codeine (TYLENOL #3) 300-30 mg per tablet Take 1 tablet by mouth every 6 (six) hours as needed for moderate pain 20 tablet 0    ARIPiprazole (ABILIFY) 10 mg tablet Take 10 mg by mouth daily      aspirin (ECOTRIN LOW STRENGTH) 81 mg EC tablet Take 81 mg by mouth daily      Calcium-Magnesium-Vitamin D (CALCIUM 500 PO) Take by mouth daily       cholecalciferol (VITAMIN D3) 1,000 units tablet Take 1,000 Units by mouth daily      Cyanocobalamin (VITAMIN B12 PO) Take by mouth daily       enoxaparin (LOVENOX) 120 mg/0 8 mL INJECT 0 8 ML (120 MG TOTAL) UNDER THE SKIN DAILY 24 Syringe 3    folic acid (FOLVITE) 1 mg tablet Take 1 tablet (1 mg total) by mouth daily  0    gemfibrozil (LOPID) 600 mg tablet TAKE 1 TABLET BY MOUTH EVERY DAY 90 tablet 1    lactulose 20 g/30 mL Take 45 mL (30 g total) by mouth 2 (two) times a day 300 mL 2    LORazepam (ATIVAN) 0 5 mg tablet Take 1 tablet (0 5 mg total) by mouth every 6 (six) hours as needed for anxiety (or nausea) 36 tablet 1    Multiple Vitamin (MULTIVITAMIN) tablet Take 1 tablet by mouth daily      Myrbetriq 50 MG TB24 TAKE 1 TABLET BY MOUTH EVERY DAY 30 tablet 5    omeprazole (PriLOSEC) 20 mg delayed release capsule Take 20 mg by mouth daily      ondansetron (ZOFRAN) 8 mg tablet Take 1 tablet (8 mg total) by mouth every 8 (eight) hours as needed for nausea or vomiting 30 tablet 1    oxybutynin (DITROPAN XL) 15 MG 24 hr tablet Take 1 tablet (15 mg total) by mouth daily at bedtime 90 tablet 3    phenazopyridine (PYRIDIUM) 200 mg tablet Take 1 tablet (200 mg total) by mouth daily as needed for bladder spasms 30 tablet 0    quinapril (ACCUPRIL) 5 mg tablet TAKE 1 TABLET BY MOUTH EVERYDAY AT BEDTIME 90 tablet 1    rucaparib (RUBRACA) 300 mg tablet Take 600 mg by mouth every 12 (twelve) hours Take with or without food  Do not repeat a vomited dose   saccharomyces boulardii (FLORASTOR) 250 mg capsule Take 1 capsule (250 mg total) by mouth 2 (two) times a day  0    Sodium Chloride Flush (Normal Saline Flush) 0 9 % SOLN       venlafaxine (EFFEXOR) 75 mg tablet Take 75 mg by mouth 3 (three) times a day        sodium chloride, PF, 0 9 % 10 mL by Intracatheter route daily for 30 doses 10cc syringes 300 mL 3     No current facility-administered medications for this visit  Allergies  Allergies   Allergen Reactions    Cephalosporins Rash     Which Cephalosporin reaction was to not specified; however, has tolerated Amoxicillin, Cefazolin, and Cefepime         The following portions of the patient's history were reviewed and updated as appropriate: allergies, current medications, past medical history, past social history, past surgical history and problem list       Vitals  Vitals:    07/06/21 1138   BP: 120/84   BP Location: Left arm   Patient Position: Sitting   Cuff Size: Adult   Pulse: 74   Weight: 75 8 kg (167 lb)   Height: 4' 11" (1 499 m)           Physical Exam  Physical Exam  Vitals reviewed  Constitutional:       General: She is not in acute distress  Appearance: Normal appearance  Eyes:      Pupils: Pupils are equal, round, and reactive to light  Cardiovascular:      Rate and Rhythm: Normal rate and regular rhythm  Heart sounds: Normal heart sounds  Pulmonary:      Effort: Pulmonary effort is normal  No respiratory distress  Breath sounds: Normal breath sounds     Genitourinary:     Comments:  Right nephrostomy tube in place  Insertion site unremarkable  Draining cloudy yellow urine  Musculoskeletal:         General: Normal range of motion  Skin:     General: Skin is warm and dry  Neurological:      General: No focal deficit present  Mental Status: She is alert  Psychiatric:         Mood and Affect: Mood normal          Behavior: Behavior normal            Results  No results found for this or any previous visit (from the past 1 hour(s))  ]  No results found for: PSA  Lab Results   Component Value Date    GLUCOSE 219 (H) 12/19/2017    CALCIUM 9 8 06/25/2021     10/27/2017    K 4 0 06/25/2021    CO2 19 (L) 06/25/2021     06/25/2021    BUN 18 06/25/2021    CREATININE 1 04 06/25/2021     Lab Results   Component Value Date    WBC 7 25 06/25/2021    HGB 10 1 (L) 06/25/2021    HCT 31 4 (L) 06/25/2021     (H) 06/25/2021     06/25/2021           Orders  Orders Placed This Encounter   Procedures    Urine culture     Standing Status:   Future     Standing Expiration Date:   7/6/2022     Order Specific Question:   [facility]     Answer:   Immediate    Urinalysis with microscopic     Standing Status:   Future     Standing Expiration Date:   7/6/2022       RAFITA Montgomery

## 2021-07-07 ENCOUNTER — OFFICE VISIT (OUTPATIENT)
Dept: FAMILY MEDICINE CLINIC | Facility: CLINIC | Age: 66
End: 2021-07-07
Payer: COMMERCIAL

## 2021-07-07 ENCOUNTER — APPOINTMENT (OUTPATIENT)
Dept: LAB | Facility: CLINIC | Age: 66
End: 2021-07-07
Payer: COMMERCIAL

## 2021-07-07 VITALS
BODY MASS INDEX: 34.22 KG/M2 | RESPIRATION RATE: 16 BRPM | DIASTOLIC BLOOD PRESSURE: 72 MMHG | SYSTOLIC BLOOD PRESSURE: 112 MMHG | WEIGHT: 163 LBS | OXYGEN SATURATION: 98 % | HEART RATE: 83 BPM | HEIGHT: 58 IN | TEMPERATURE: 97.6 F

## 2021-07-07 DIAGNOSIS — I82.4Y1 ACUTE DEEP VEIN THROMBOSIS (DVT) OF PROXIMAL VEIN OF RIGHT LOWER EXTREMITY (HCC): ICD-10-CM

## 2021-07-07 DIAGNOSIS — I10 BENIGN ESSENTIAL HYPERTENSION: ICD-10-CM

## 2021-07-07 DIAGNOSIS — Z13.820 SCREENING FOR OSTEOPOROSIS: ICD-10-CM

## 2021-07-07 DIAGNOSIS — Z00.00 ANNUAL PHYSICAL EXAM: Primary | ICD-10-CM

## 2021-07-07 DIAGNOSIS — C54.1 ENDOMETRIAL CANCER (HCC): ICD-10-CM

## 2021-07-07 DIAGNOSIS — R73.03 PRE-DIABETES: ICD-10-CM

## 2021-07-07 DIAGNOSIS — R30.0 DYSURIA: ICD-10-CM

## 2021-07-07 DIAGNOSIS — C54.1 ENDOMETRIAL CANCER (HCC): Chronic | ICD-10-CM

## 2021-07-07 PROBLEM — C79.89 SECONDARY MALIGNANT NEOPLASM OF OTHER SPECIFIED SITES (HCC): Status: ACTIVE | Noted: 2021-07-07

## 2021-07-07 LAB
CREAT UR-MCNC: 41.1 MG/DL
PROT UR-MCNC: 55 MG/DL
PROT/CREAT UR: 1.34 MG/G{CREAT} (ref 0–0.1)
SL AMB POCT HEMOGLOBIN AIC: 4.8 (ref ?–6.5)

## 2021-07-07 PROCEDURE — 83036 HEMOGLOBIN GLYCOSYLATED A1C: CPT | Performed by: INTERNAL MEDICINE

## 2021-07-07 PROCEDURE — 82570 ASSAY OF URINE CREATININE: CPT

## 2021-07-07 PROCEDURE — 1036F TOBACCO NON-USER: CPT | Performed by: INTERNAL MEDICINE

## 2021-07-07 PROCEDURE — 84156 ASSAY OF PROTEIN URINE: CPT

## 2021-07-07 PROCEDURE — 99396 PREV VISIT EST AGE 40-64: CPT | Performed by: INTERNAL MEDICINE

## 2021-07-07 PROCEDURE — 99214 OFFICE O/P EST MOD 30 MIN: CPT | Performed by: INTERNAL MEDICINE

## 2021-07-07 PROCEDURE — 87086 URINE CULTURE/COLONY COUNT: CPT

## 2021-07-07 NOTE — PROGRESS NOTES
Assessment/Plan:         Diagnoses and all orders for this visit:    Annual physical exam    Screening for osteoporosis  -     DXA bone density spine hip and pelvis; Future    Benign essential hypertension  Comments:  stable    Acute deep vein thrombosis (DVT) of proximal vein of right lower extremity (HCC)  Comments:  on lovenox    Endometrial cancer ([facility] [address] )  Comments:  on clinical trial    Pre-diabetes  -     POCT hemoglobin A1c          Subjective:      Patient ID: Shirley Rodriguez is a 63 y o  female  Pt undergoing chemo on a clinical trial   She is also on po chemo bid  The following portions of the patient's history were reviewed and updated as appropriate: She  has a past medical history of Anemia, Chemotherapy follow-up examination, Depression, Endometrial cancer ([facility] [place] [address] ) (12/2017), Hyperglycemia, Hyperlipidemia, Hypertension, and Obesity    She   Patient Active Problem List    Diagnosis Date Noted    Secondary malignant neoplasm of other specified sites [facility]) 07/07/2021    Abnormal urine findings 06/25/2021    Pubic bone pain 05/14/2021    Closed nondisplaced fracture of right pubis with delayed healing 05/14/2021    Exertional dyspnea 04/02/2021    Need for follow-up by  03/12/2021    Dental disorder 01/29/2021    Drug induced constipation 01/08/2021    Osteoporosis 11/30/2020    Pathological fracture due to osteoporosis 11/25/2020    Bilateral piriformis syndrome     Chronic pain syndrome     Myofascial pain syndrome     Nephrostomy status ([facility] [place] [address] ) 10/14/2020    Overactive bladder 08/06/2020    Acute left-sided low back pain without sciatica 08/06/2020    Obstruction of right ureter 01/03/2020    Hypomagnesemia 10/18/2019    Ambulatory dysfunction 10/11/2019    Moderate protein-calorie malnutrition ([facility] [address] ) 10/03/2019    Bilateral lower extremity edema 09/30/2019    Generalized weakness 09/29/2019    Hypokalemia 09/19/2019    Hyponatremia 09/19/2019    Chemotherapy-induced nausea 09/18/2019    Anemia 09/06/2019    Chemotherapy induced neutropenia ([facility] [address] ) 08/30/2019    Cancer related pain 07/19/2019    Encounter for central line care 07/16/2019    Acute deep vein thrombosis (DVT) of proximal vein of lower extremity ([email or web address] ) 06/19/2019    Breast cancer screening 06/19/2019    Benign essential hypertension 12/19/2017    Class 3 severe obesity with body mass index (BMI) of 45 0 to 49 9 in adult [facility]) 12/19/2017    Endometrial cancer ([email or web address] ) 11/29/2017    Dyslipidemia 04/01/2014    Major depression, chronic 10/07/2013     She  has a past surgical history that includes Abdominal surgery; Colonoscopy; pr lap, radical hyst w/ tube&ov, node bx (N/A, 12/19/2017); pr lap,diagnostic abdomen (N/A, 12/19/2017); Cholecystectomy; Gastric bypass; Tonsillectomy; Hysterectomy (Bilateral); Oophorectomy (Bilateral); US guided breast biopsy right complete (Right, 6/28/2019); CT needle biopsy lymph node (7/8/2019); IR port placement (7/26/2019); IR nephrostomy tube placement (7/26/2019); IR port removal (9/20/2019); IR PICC line (9/27/2019); pr insj tunneled ctr vad w/subq port age 13 yr/> (Left, 11/12/2019); FL guided central venous access device insertion (11/12/2019); IR nephroureteral stent check/change/reposition (4/6/2021); and IR nephroureteral stent check/change/reposition (6/4/2021)  Her family history includes Bone cancer in her maternal grandfather; Lymphoma in her father; No Known Problems in her brother, brother, maternal aunt, maternal aunt, maternal aunt, maternal aunt, maternal grandmother, paternal aunt, paternal aunt, paternal aunt, paternal aunt, paternal grandfather, and sister; Other in her mother; Ovarian cancer (age of onset: 48) in her mother; Uterine cancer in her paternal grandmother  She  reports that she has never smoked  She has never used smokeless tobacco  She reports previous alcohol use  She reports that she does not use drugs    Current Outpatient Medications   Medication Sig Dispense Refill    acetaminophen (TYLENOL) 325 mg tablet Take 2 tablets (650 mg total) by mouth every 6 (six) hours as needed for mild pain, headaches or fever 30 tablet 0    acetaminophen-codeine (TYLENOL #3) 300-30 mg per tablet Take 1 tablet by mouth every 6 (six) hours as needed for moderate pain 20 tablet 0    ARIPiprazole (ABILIFY) 10 mg tablet Take 10 mg by mouth daily      aspirin (ECOTRIN LOW STRENGTH) 81 mg EC tablet Take 81 mg by mouth daily      Calcium-Magnesium-Vitamin D (CALCIUM 500 PO) Take by mouth daily       cholecalciferol (VITAMIN D3) 1,000 units tablet Take 1,000 Units by mouth daily      Cyanocobalamin (VITAMIN B12 PO) Take by mouth daily       enoxaparin (LOVENOX) 120 mg/0 8 mL INJECT 0 8 ML (120 MG TOTAL) UNDER THE SKIN DAILY 24 Syringe 3    folic acid (FOLVITE) 1 mg tablet Take 1 tablet (1 mg total) by mouth daily  0    gemfibrozil (LOPID) 600 mg tablet TAKE 1 TABLET BY MOUTH EVERY DAY 90 tablet 1    lactulose 20 g/30 mL Take 45 mL (30 g total) by mouth 2 (two) times a day 300 mL 2    LORazepam (ATIVAN) 0 5 mg tablet Take 1 tablet (0 5 mg total) by mouth every 6 (six) hours as needed for anxiety (or nausea) 36 tablet 1    Multiple Vitamin (MULTIVITAMIN) tablet Take 1 tablet by mouth daily      Myrbetriq 50 MG TB24 TAKE 1 TABLET BY MOUTH EVERY DAY 30 tablet 5    omeprazole (PriLOSEC) 20 mg delayed release capsule Take 20 mg by mouth daily      ondansetron (ZOFRAN) 8 mg tablet Take 1 tablet (8 mg total) by mouth every 8 (eight) hours as needed for nausea or vomiting 30 tablet 1    oxybutynin (DITROPAN XL) 15 MG 24 hr tablet Take 1 tablet (15 mg total) by mouth daily at bedtime 90 tablet 3    phenazopyridine (PYRIDIUM) 200 mg tablet Take 1 tablet (200 mg total) by mouth daily as needed for bladder spasms 30 tablet 0    quinapril (ACCUPRIL) 5 mg tablet TAKE 1 TABLET BY MOUTH EVERYDAY AT BEDTIME 90 tablet 1    rucaparib (RUBRACA) 300 mg tablet Take 600 mg by mouth every 12 (twelve) hours Take with or without food  Do not repeat a vomited dose   saccharomyces boulardii (FLORASTOR) 250 mg capsule Take 1 capsule (250 mg total) by mouth 2 (two) times a day  0    Sodium Chloride Flush (Normal Saline Flush) 0 9 % SOLN       venlafaxine (EFFEXOR) 75 mg tablet Take 75 mg by mouth 3 (three) times a day        sodium chloride, PF, 0 9 % 10 mL by Intracatheter route daily for 30 doses 10cc syringes 300 mL 3     No current facility-administered medications for this visit       Current Outpatient Medications on File Prior to Visit   Medication Sig    acetaminophen (TYLENOL) 325 mg tablet Take 2 tablets (650 mg total) by mouth every 6 (six) hours as needed for mild pain, headaches or fever    acetaminophen-codeine (TYLENOL #3) 300-30 mg per tablet Take 1 tablet by mouth every 6 (six) hours as needed for moderate pain    ARIPiprazole (ABILIFY) 10 mg tablet Take 10 mg by mouth daily    aspirin (ECOTRIN LOW STRENGTH) 81 mg EC tablet Take 81 mg by mouth daily    Calcium-Magnesium-Vitamin D (CALCIUM 500 PO) Take by mouth daily     cholecalciferol (VITAMIN D3) 1,000 units tablet Take 1,000 Units by mouth daily    Cyanocobalamin (VITAMIN B12 PO) Take by mouth daily     enoxaparin (LOVENOX) 120 mg/0 8 mL INJECT 0 8 ML (120 MG TOTAL) UNDER THE SKIN DAILY    folic acid (FOLVITE) 1 mg tablet Take 1 tablet (1 mg total) by mouth daily    gemfibrozil (LOPID) 600 mg tablet TAKE 1 TABLET BY MOUTH EVERY DAY    lactulose 20 g/30 mL Take 45 mL (30 g total) by mouth 2 (two) times a day    LORazepam (ATIVAN) 0 5 mg tablet Take 1 tablet (0 5 mg total) by mouth every 6 (six) hours as needed for anxiety (or nausea)    Multiple Vitamin (MULTIVITAMIN) tablet Take 1 tablet by mouth daily    Myrbetriq 50 MG TB24 TAKE 1 TABLET BY MOUTH EVERY DAY    omeprazole (PriLOSEC) 20 mg delayed release capsule Take 20 mg by mouth daily    ondansetron (ZOFRAN) 8 mg tablet Take 1 tablet (8 mg total) by mouth every 8 (eight) hours as needed for nausea or vomiting    oxybutynin (DITROPAN XL) 15 MG 24 hr tablet Take 1 tablet (15 mg total) by mouth daily at bedtime    phenazopyridine (PYRIDIUM) 200 mg tablet Take 1 tablet (200 mg total) by mouth daily as needed for bladder spasms    quinapril (ACCUPRIL) 5 mg tablet TAKE 1 TABLET BY MOUTH EVERYDAY AT BEDTIME    rucaparib (RUBRACA) 300 mg tablet Take 600 mg by mouth every 12 (twelve) hours Take with or without food  Do not repeat a vomited dose   saccharomyces boulardii (FLORASTOR) 250 mg capsule Take 1 capsule (250 mg total) by mouth 2 (two) times a day    Sodium Chloride Flush (Normal Saline Flush) 0 9 % SOLN     venlafaxine (EFFEXOR) 75 mg tablet Take 75 mg by mouth 3 (three) times a day      sodium chloride, PF, 0 9 % 10 mL by Intracatheter route daily for 30 doses 10cc syringes     No current facility-administered medications on file prior to visit  She is allergic to cephalosporins       Review of Systems   Constitutional: Negative  HENT: Negative  Respiratory: Negative  Cardiovascular: Negative  Gastrointestinal: Positive for constipation  Genitourinary: Positive for dysuria  Controlled with replens and aquaphor         Objective:      /72 (BP Location: Right arm, Patient Position: Sitting, Cuff Size: Standard)   Pulse 83   Temp 97 6 °F (36 4 °C) (Temporal)   Resp 16   Ht 4' 10" (1 473 m)   Wt 73 9 kg (163 lb)   LMP  (LMP Unknown)   SpO2 98%   BMI 34 07 kg/m²          Physical Exam  Constitutional:       Appearance: Normal appearance  HENT:      Head: Normocephalic and atraumatic  Right Ear: Tympanic membrane normal       Left Ear: Tympanic membrane normal       Nose: Nose normal    Cardiovascular:      Rate and Rhythm: Normal rate and regular rhythm  Pulmonary:      Effort: Pulmonary effort is normal       Breath sounds: Normal breath sounds     Musculoskeletal:      Cervical back: Neck supple  Lymphadenopathy:      Cervical: No cervical adenopathy  Neurological:      Mental Status: She is alert

## 2021-07-07 NOTE — PATIENT INSTRUCTIONS
Wellness Visit for Adults   AMBULATORY CARE:   A wellness visit  is when you see your healthcare provider to get screened for health problems  Your healthcare provider will also give you advice on how to stay healthy  Write down your questions so you remember to ask them  Ask your healthcare provider how often you should have a wellness visit  What happens at a wellness visit:  Your healthcare provider will ask about your health, and your family history of health problems  This includes high blood pressure, heart disease, and cancer  He or she will ask if you have symptoms that concern you, if you smoke, and about your mood  You may also be asked about your intake of medicines, supplements, food, and alcohol  Any of the following may be done:  · Your weight  will be checked  Your height may also be checked so your body mass index (BMI) can be calculated  Your BMI shows if you are at a healthy weight  · Your blood pressure  and heart rate will be checked  Your temperature may also be checked  · Blood and urine tests  may be done  Blood tests may be done to check your cholesterol levels  Abnormal cholesterol levels increase your risk for heart disease and stroke  You may also need a blood or urine test to check for diabetes if you are at increased risk  Urine tests may be done to look for signs of an infection or kidney disease  · A physical exam  includes checking your heartbeat and lungs with a stethoscope  Your healthcare provider may also check your skin to look for sun damage  · Screening tests  may be recommended  A screening test is done to check for diseases that may not cause symptoms  The screening tests you may need depend on your age, gender, family history, and lifestyle habits  For example, colorectal screening may be recommended if you are 48years old or older  Screening tests you need if you are a woman:   · A Pap smear  is used to screen for cervical cancer   Pap smears are usually done every 3 to 5 years depending on your age  You may need them more often if you have had abnormal Pap smear test results in the past  Ask your healthcare provider how often you should have a Pap smear  · A mammogram  is an x-ray of your breasts to screen for breast cancer  Experts recommend mammograms every 2 years starting at age 48 years  You may need a mammogram at age 52 years or younger if you have an increased risk for breast cancer  Talk to your healthcare provider about when you should start having mammograms and how often you need them  Vaccines you may need:   · Get an influenza vaccine  every year  The influenza vaccine protects you from the flu  Several types of viruses cause the flu  The viruses change over time, so new vaccines are made each year  · Get a tetanus-diphtheria (Td) booster vaccine  every 10 years  This vaccine protects you against tetanus and diphtheria  Tetanus is a severe infection that may cause painful muscle spasms and lockjaw  Diphtheria is a severe bacterial infection that causes a thick covering in the back of your mouth and throat  · Get a human papillomavirus (HPV) vaccine  if you are female and aged 23 to 32 or male 23 to 24 and never received it  This vaccine protects you from HPV infection  HPV is the most common infection spread by sexual contact  HPV may also cause vaginal, penile, and anal cancers  · Get a pneumococcal vaccine  if you are aged 72 years or older  The pneumococcal vaccine is an injection given to protect you from pneumococcal disease  Pneumococcal disease is an infection caused by pneumococcal bacteria  The infection may cause pneumonia, meningitis, or an ear infection  · Get a shingles vaccine  if you are 60 or older, even if you have had shingles before  The shingles vaccine is an injection to protect you from the varicella-zoster virus  This is the same virus that causes chickenpox   Shingles is a painful rash that develops in people who had chickenpox or have been exposed to the virus  How to eat healthy:  My Plate is a model for planning healthy meals  It shows the types and amounts of foods that should go on your plate  Fruits and vegetables make up about half of your plate, and grains and protein make up the other half  A serving of dairy is included on the side of your plate  The amount of calories and serving sizes you need depends on your age, gender, weight, and height  Examples of healthy foods are listed below:  · Eat a variety of vegetables  such as dark green, red, and orange vegetables  You can also include canned vegetables low in sodium (salt) and frozen vegetables without added butter or sauces  · Eat a variety of fresh fruits , canned fruit in 100% juice, frozen fruit, and dried fruit  · Include whole grains  At least half of the grains you eat should be whole grains  Examples include whole-wheat bread, wheat pasta, brown rice, and whole-grain cereals such as oatmeal     · Eat a variety of protein foods such as seafood (fish and shellfish), lean meat, and poultry without skin (turkey and chicken)  Examples of lean meats include pork leg, shoulder, or tenderloin, and beef round, sirloin, tenderloin, and extra lean ground beef  Other protein foods include eggs and egg substitutes, beans, peas, soy products, nuts, and seeds  · Choose low-fat dairy products such as skim or 1% milk or low-fat yogurt, cheese, and cottage cheese  · Limit unhealthy fats  such as butter, hard margarine, and shortening  Exercise:  Exercise at least 30 minutes per day on most days of the week  Some examples of exercise include walking, biking, dancing, and swimming  You can also fit in more physical activity by taking the stairs instead of the elevator or parking farther away from stores  Include muscle strengthening activities 2 days each week  Regular exercise provides many health benefits   It helps you manage your weight, and decreases your risk for type 2 diabetes, heart disease, stroke, and high blood pressure  Exercise can also help improve your mood  Ask your healthcare provider about the best exercise plan for you  General health and safety guidelines:   · Do not smoke  Nicotine and other chemicals in cigarettes and cigars can cause lung damage  Ask your healthcare provider for information if you currently smoke and need help to quit  E-cigarettes or smokeless tobacco still contain nicotine  Talk to your healthcare provider before you use these products  · Limit alcohol  A drink of alcohol is 12 ounces of beer, 5 ounces of wine, or 1½ ounces of liquor  · Lose weight, if needed  Being overweight increases your risk of certain health conditions  These include heart disease, high blood pressure, type 2 diabetes, and certain types of cancer  · Protect your skin  Do not sunbathe or use tanning beds  Use sunscreen with a SPF 15 or higher  Apply sunscreen at least 15 minutes before you go outside  Reapply sunscreen every 2 hours  Wear protective clothing, hats, and sunglasses when you are outside  · Drive safely  Always wear your seatbelt  Make sure everyone in your car wears a seatbelt  A seatbelt can save your life if you are in an accident  Do not use your cell phone when you are driving  This could distract you and cause an accident  Pull over if you need to make a call or send a text message  · Practice safe sex  Use latex condoms if are sexually active and have more than one partner  Your healthcare provider may recommend screening tests for sexually transmitted infections (STIs)  · Wear helmets, lifejackets, and protective gear  Always wear a helmet when you ride a bike or motorcycle, go skiing, or play sports that could cause a head injury  Wear protective equipment when you play sports  Wear a lifejacket when you are on a boat or doing water sports      © Copyright [employer] 2020 Information is for End User's use only and may not be sold, redistributed or otherwise used for commercial purposes  All illustrations and images included in CareNotes® are the copyrighted property of [employer]  or Reanna Conrad  The above information is an  only  It is not intended as medical advice for individual conditions or treatments  Talk to your doctor, nurse or pharmacist before following any medical regimen to see if it is safe and effective for you

## 2021-07-07 NOTE — PROGRESS NOTES
[facility]    NAME: Dalton Harris  AGE: 63 y o  SEX: female  : 1955     DATE: 2021     Assessment and Plan:     Problem List Items Addressed This Visit     None      Visit Diagnoses     Annual physical exam    -  Primary    Screening for osteoporosis              Immunizations and preventive care screenings were discussed with patient today  Appropriate education was printed on patient's after visit summary  Counseling:  · Dental Health: discussed importance of regular tooth brushing, flossing, and dental visits  BMI Counseling: Body mass index is 34 07 kg/m²  The BMI is above normal  Nutrition recommendations include decreasing portion sizes and limiting drinks that contain sugar  Exercise recommendations include exercising 3-5 times per week  No pharmacotherapy was ordered  Patient referred to PCP due to patient being overweight  Return in about 6 months (around 2022)  Chief Complaint:     Chief Complaint   Patient presents with    Annual Exam     physical    Follow-up      History of Present Illness:     Adult Annual Physical   Patient here for a comprehensive physical exam  The patient reports problems - constip with chemo tx       Diet and Physical Activity  · Diet/Nutrition: consuming 3-5 servings of fruits/vegetables daily  · Exercise: no formal exercise  Depression Screening  PHQ-9 Depression Screening    PHQ-9:   Frequency of the following problems over the past two weeks:           General Health  · Sleep: gets 4-6 hours of sleep on average  · Hearing: normal - bilateral   · Vision: no vision problems  · Dental: regular dental visits  /GYN Health  · Patient is: postmenopausal  · Last menstrual period: age 52  · Contraceptive method: none  Review of Systems:     Review of Systems   Constitutional: Negative  Negative for chills and fever  HENT: Negative  Respiratory: Negative  Cardiovascular: Negative  Gastrointestinal: Positive for constipation     Genitourinary:        +dysuria helped with replens and aquaphor      Past Medical History:     Past Medical History:   Diagnosis Date    Anemia     Chemotherapy follow-up examination     Depression     Endometrial cancer ([address] ) 12/2017    Hyperglycemia     vx type 2 dm -- last assessed 4/1/14; resolved 11/7/17    Hyperlipidemia     Hypertension     Obesity     last assessed 10/14/17; resolved 11/7/17      Past Surgical History:     Past Surgical History:   Procedure Laterality Date    ABDOMINAL SURGERY      GASTRIC BYPASS    CHOLECYSTECTOMY      at the time of gastric bypass    COLONOSCOPY      CT NEEDLE BIOPSY LYMPH NODE  7/8/2019    FL GUIDED CENTRAL VENOUS ACCESS DEVICE INSERTION  11/12/2019    GASTRIC BYPASS      HYSTERECTOMY Bilateral     total abdominal with salpingo-oophorectomy    IR NEPHROSTOMY TUBE PLACEMENT  7/26/2019    IR NEPHROURETERAL STENT CHECK/CHANGE/REPOSITION  4/6/2021    IR NEPHROURETERAL STENT CHECK/CHANGE/REPOSITION  6/4/2021    IR PICC LINE  9/27/2019    IR PORT PLACEMENT  7/26/2019    IR PORT REMOVAL  9/20/2019    OOPHORECTOMY Bilateral     LA INSJ TUNNELED CTR VAD W/SUBQ PORT AGE 5 YR/> Left 11/12/2019    Procedure: INSERTION VENOUS PORT ( PORT-A-CATH) IR;  Surgeon: Arleen Cash DO;  [facility];  Service: Interventional Radiology    LA LAP, RADICAL HYST W/ TUBE&OV, NODE BX N/A 12/19/2017    Procedure: HYSTERECTOMY LAPAROSCOPIC TOTAL ([address]) W/ ROBOTICS; BILATERAL SALPINGOOPHERECTOMY; LYMPH NODE DISSECTION; lysis of adhesions;  Surgeon: Matthias Richardson MD;  [facility];  Service: Gynecology Oncology    LA LAP,DIAGNOSTIC ABDOMEN N/A 12/19/2017    Procedure: LAPAROSCOPY DIAGNOSTIC;  Surgeon: Matthias Richardson MD;  [facility];  Service: Gynecology Oncology    TONSILLECTOMY      US GUIDED BREAST BIOPSY RIGHT COMPLETE Right 6/28/2019 Social History:     Social History     Socioeconomic History    Marital status: Single     Spouse name: None    Number of children: None    Years of education: None    Highest education level: None   Occupational History    None   Tobacco Use    Smoking status: Never Smoker    Smokeless tobacco: Never Used   Vaping Use    Vaping Use: Never used   Substance and Sexual Activity    Alcohol use: Not Currently    Drug use: No    Sexual activity: Not Currently   Other Topics Concern    None   Social History Narrative    None     Social Determinants of Health     Financial Resource Strain:     Difficulty of Paying Living Expenses:    Food Insecurity:     Worried About Running Out of Food in the Last Year:     Ran Out of Food in the Last Year:    Transportation Needs:     Lack of Transportation (Medical):      Lack of Transportation (Non-Medical):    Physical Activity:     Days of Exercise per Week:     Minutes of Exercise per Session:    Stress:     Feeling of Stress :    Social Connections:     Frequency of Communication with Friends and Family:     Frequency of Social Gatherings with Friends and Family:     Attends Zoroastrian Services:     Active Member of Clubs or Organizations:     Attends Club or Organization Meetings:     Marital Status:    Intimate Partner Violence:     Fear of Current or Ex-Partner:     Emotionally Abused:     Physically Abused:     Sexually Abused:       Family History:     Family History   Problem Relation Age of Onset    Other Mother         dyslipidemia    Ovarian cancer Mother 48    Lymphoma Father     Bone cancer Maternal Grandfather     No Known Problems Sister     No Known Problems Maternal Grandmother     Uterine cancer Paternal Grandmother     No Known Problems Paternal Grandfather     No Known Problems Maternal Aunt     No Known Problems Maternal Aunt     No Known Problems Maternal Aunt     No Known Problems Maternal Aunt     No Known Problems Paternal Aunt     No Known Problems Paternal Aunt     No Known Problems Paternal Aunt     No Known Problems Paternal Aunt     No Known Problems Brother     No Known Problems Brother       Current Medications:     Current Outpatient Medications   Medication Sig Dispense Refill    acetaminophen (TYLENOL) 325 mg tablet Take 2 tablets (650 mg total) by mouth every 6 (six) hours as needed for mild pain, headaches or fever 30 tablet 0    acetaminophen-codeine (TYLENOL #3) 300-30 mg per tablet Take 1 tablet by mouth every 6 (six) hours as needed for moderate pain 20 tablet 0    ARIPiprazole (ABILIFY) 10 mg tablet Take 10 mg by mouth daily      aspirin (ECOTRIN LOW STRENGTH) 81 mg EC tablet Take 81 mg by mouth daily      Calcium-Magnesium-Vitamin D (CALCIUM 500 PO) Take by mouth daily       cholecalciferol (VITAMIN D3) 1,000 units tablet Take 1,000 Units by mouth daily      Cyanocobalamin (VITAMIN B12 PO) Take by mouth daily       enoxaparin (LOVENOX) 120 mg/0 8 mL INJECT 0 8 ML (120 MG TOTAL) UNDER THE SKIN DAILY 24 Syringe 3    folic acid (FOLVITE) 1 mg tablet Take 1 tablet (1 mg total) by mouth daily  0    gemfibrozil (LOPID) 600 mg tablet TAKE 1 TABLET BY MOUTH EVERY DAY 90 tablet 1    lactulose 20 g/30 mL Take 45 mL (30 g total) by mouth 2 (two) times a day 300 mL 2    LORazepam (ATIVAN) 0 5 mg tablet Take 1 tablet (0 5 mg total) by mouth every 6 (six) hours as needed for anxiety (or nausea) 36 tablet 1    Multiple Vitamin (MULTIVITAMIN) tablet Take 1 tablet by mouth daily      Myrbetriq 50 MG TB24 TAKE 1 TABLET BY MOUTH EVERY DAY 30 tablet 5    omeprazole (PriLOSEC) 20 mg delayed release capsule Take 20 mg by mouth daily      ondansetron (ZOFRAN) 8 mg tablet Take 1 tablet (8 mg total) by mouth every 8 (eight) hours as needed for nausea or vomiting 30 tablet 1    oxybutynin (DITROPAN XL) 15 MG 24 hr tablet Take 1 tablet (15 mg total) by mouth daily at bedtime 90 tablet 3    phenazopyridine (PYRIDIUM) 200 mg tablet Take 1 tablet (200 mg total) by mouth daily as needed for bladder spasms 30 tablet 0    quinapril (ACCUPRIL) 5 mg tablet TAKE 1 TABLET BY MOUTH EVERYDAY AT BEDTIME 90 tablet 1    rucaparib (RUBRACA) 300 mg tablet Take 600 mg by mouth every 12 (twelve) hours Take with or without food  Do not repeat a vomited dose   saccharomyces boulardii (FLORASTOR) 250 mg capsule Take 1 capsule (250 mg total) by mouth 2 (two) times a day  0    Sodium Chloride Flush (Normal Saline Flush) 0 9 % SOLN       venlafaxine (EFFEXOR) 75 mg tablet Take 75 mg by mouth 3 (three) times a day        sodium chloride, PF, 0 9 % 10 mL by Intracatheter route daily for 30 doses 10cc syringes 300 mL 3     No current facility-administered medications for this visit  Allergies: Allergies   Allergen Reactions    Cephalosporins Rash     Which Cephalosporin reaction was to not specified; however, has tolerated Amoxicillin, Cefazolin, and Cefepime      Physical Exam:     /72 (BP Location: Right arm, Patient Position: Sitting, Cuff Size: Standard)   Pulse 83   Temp 97 6 °F (36 4 °C) (Temporal)   Resp 16   Ht 4' 10" (1 473 m)   Wt 73 9 kg (163 lb)   LMP  (LMP Unknown)   SpO2 98%   BMI 34 07 kg/m²     Physical Exam  Constitutional:       Appearance: Normal appearance  HENT:      Head: Normocephalic and atraumatic  Right Ear: Tympanic membrane and ear canal normal       Left Ear: Tympanic membrane and ear canal normal       Nose: Nose normal    Cardiovascular:      Rate and Rhythm: Normal rate and regular rhythm  Pulmonary:      Effort: Pulmonary effort is normal       Breath sounds: Normal breath sounds  Musculoskeletal:      Cervical back: Neck supple  Lymphadenopathy:      Cervical: No cervical adenopathy  Neurological:      Mental Status: She is alert            DO Gricelda Hernandes

## 2021-07-08 DIAGNOSIS — C54.1 ENDOMETRIAL CANCER (HCC): Primary | ICD-10-CM

## 2021-07-08 LAB — BACTERIA UR CULT: NORMAL

## 2021-07-12 DIAGNOSIS — N32.81 OAB (OVERACTIVE BLADDER): ICD-10-CM

## 2021-07-12 RX ORDER — MIRABEGRON 50 MG/1
TABLET, FILM COATED, EXTENDED RELEASE ORAL
Qty: 30 TABLET | Refills: 5 | Status: SHIPPED | OUTPATIENT
Start: 2021-07-12 | End: 2021-11-11 | Stop reason: ALTCHOICE

## 2021-07-12 NOTE — PROGRESS NOTES
Outpatient Oncology Nutrition Consult  Type of Consult: Follow Up  Care Location: [facility]   Nutrition Assessment:  Oncology Diagnosis & Treatments:   Endometrial cancer  -S/p surgery 12/19/17    -Recurrence 7/8/19    -S/p chemotherapy (carboplatin/taxol from 7/30/19-1/6/20)  -S/p whole pelvis RT (2/4/20- 4/13/20)  -Disease progression    -ENDOBAR trial started 12/21/20  Oncology History   Endometrial cancer ([facility] [address] )   11/17/2017 Initial Diagnosis    Endometrial cancer ([facility] [address] )     11/17/2017 Biopsy    ENDOMETRIAL BIOPSY: WELL DIFFERENTIATED ENDOMETRIAL ADENOCARCINOMA (FIGO I) WITH FOCALMUCINOUS FEATURES  Part B: ENDOCERVICAL POLYP:BENIGN ENDOCERVICAL      12/19/2017 Surgery    Robotic assisted total laparoscopic hysterectomy with bilateral salpingo-oophorectomy and sentinel bilateral pelvic lymph node dissection  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 (4 4 x 3 2 cm tumor, 9 4/15 4mm invasion, NO LVSI, washings revealed atypical cellular changes)     12/19/2017 Genetic Testing    Morrison testing negative     6/28/2019 Biopsy    A  Breast, Right, US BX Right Breast 1000 4 cmfn:  - Benign breast tissue with focal histiocytic aggregate  See comment   - Negative for atypia and in-situ or invasive carcinoma  7/8/2019 Recurrence    Presented with right lower extremity DVT and CT demonstrating right pelvic sidewall mass with venous, ureteral and nerve compression causing significant neuropathic pain  Biopsy:  Lymph Node, Right pelvic lymph node x3:  - High-grade adenocarcinoma  7/30/2019 - 1/6/2020 Chemotherapy    Taxol 175 mg/m2 and carboplatin AUC 6 every 21 days  Dose was reduced to taxol 135 mg/m2 and carboplatin AUC 5  Completed 6 cycles  Treatment protracted due to multiple hospital admissions       2/24/2020 - 4/13/2020 Radiation    adjuvant external beam radiation therapy to the whole pelvis to 4500 cGy followed by boost to gross disease of an additional 2200 cGy 11/23/2020 Progression    New necrotic adenopathy in the retroperitoneum on CT      12/21/2020 -  Chemotherapy    Began chemotherapy with rucaparib, atezolizumab, and bevacizumab as per [facility] clinical trial        PMH:  RNYGB (2001 at [address])    Past Medical History:   Diagnosis Date    Anemia     Chemotherapy follow-up examination     Depression     Endometrial cancer ([address] ) 12/2017    Hyperglycemia     vx type 2 dm -- last assessed 4/1/14; resolved 11/7/17    Hyperlipidemia     Hypertension     Obesity     last assessed 10/14/17; resolved 11/7/17     Past Surgical History:   Procedure Laterality Date    ABDOMINAL SURGERY      GASTRIC BYPASS    CHOLECYSTECTOMY      at the time of gastric bypass    COLONOSCOPY      CT NEEDLE BIOPSY LYMPH NODE  7/8/2019    FL GUIDED CENTRAL VENOUS ACCESS DEVICE INSERTION  11/12/2019    GASTRIC BYPASS      HYSTERECTOMY Bilateral     total abdominal with salpingo-oophorectomy    IR NEPHROSTOMY TUBE PLACEMENT  7/26/2019    IR NEPHROURETERAL STENT CHECK/CHANGE/REPOSITION  4/6/2021    IR NEPHROURETERAL STENT CHECK/CHANGE/REPOSITION  6/4/2021    IR PICC LINE  9/27/2019    IR PORT PLACEMENT  7/26/2019    IR PORT REMOVAL  9/20/2019    OOPHORECTOMY Bilateral     TN INSJ TUNNELED CTR VAD W/SUBQ PORT AGE 5 YR/> Left 11/12/2019    Procedure: INSERTION VENOUS PORT ( PORT-A-CATH) IR;  Surgeon: Arleen Cash DO;  [facility]: [facility];  Service: Interventional Radiology    TN LAP, RADICAL HYST W/ TUBE&OV, NODE BX N/A 12/19/2017    Procedure: HYSTERECTOMY LAPAROSCOPIC TOTAL ([address]) W/ ROBOTICS; BILATERAL SALPINGOOPHERECTOMY; LYMPH NODE DISSECTION; lysis of adhesions;  Surgeon: Matthias Richardson MD;  Location: [facility];  Service: Gynecology Oncology    TN LAP,DIAGNOSTIC ABDOMEN N/A 12/19/2017    Procedure: LAPAROSCOPY DIAGNOSTIC;  Surgeon: Matthias Richardson MD;  [facility]: [facility];  Service: Gynecology Oncology    TONSILLECTOMY      US GUIDED BREAST BIOPSY RIGHT COMPLETE Right 6/28/2019       Review of Medications:   Vitamins, Supplements and Herbals: yes: Hx RNYGB regimen: Vitamin D, Folic Acid, Align, calcium citrate TID, B12 1000 mcg QD, MVI BID (MVI does not have iron);  Iron 65 mg QD (2 hrs seperate from calcium)    Current Outpatient Medications:     acetaminophen (TYLENOL) 325 mg tablet, Take 2 tablets (650 mg total) by mouth every 6 (six) hours as needed for mild pain, headaches or fever, Disp: 30 tablet, Rfl: 0    acetaminophen-codeine (TYLENOL #3) 300-30 mg per tablet, Take 1 tablet by mouth every 6 (six) hours as needed for moderate pain, Disp: 20 tablet, Rfl: 0    ARIPiprazole (ABILIFY) 10 mg tablet, Take 10 mg by mouth daily, Disp: , Rfl:     aspirin (ECOTRIN LOW STRENGTH) 81 mg EC tablet, Take 81 mg by mouth daily, Disp: , Rfl:     Calcium-Magnesium-Vitamin D (CALCIUM 500 PO), Take by mouth daily , Disp: , Rfl:     cholecalciferol (VITAMIN D3) 1,000 units tablet, Take 1,000 Units by mouth daily, Disp: , Rfl:     Cyanocobalamin (VITAMIN B12 PO), Take by mouth daily , Disp: , Rfl:     enoxaparin (LOVENOX) 100 mg/mL, INJECT 1 ML (100 MG TOTAL) UNDER THE SKIN EVERY 24 HOURS, Disp: 90 mL, Rfl: 2    folic acid (FOLVITE) 1 mg tablet, Take 1 tablet (1 mg total) by mouth daily, Disp: , Rfl: 0    gemfibrozil (LOPID) 600 mg tablet, TAKE 1 TABLET BY MOUTH EVERY DAY, Disp: 90 tablet, Rfl: 1    lactulose 20 g/30 mL, Take 45 mL (30 g total) by mouth 2 (two) times a day, Disp: 300 mL, Rfl: 2    LORazepam (ATIVAN) 0 5 mg tablet, Take 1 tablet (0 5 mg total) by mouth every 6 (six) hours as needed for anxiety (or nausea), Disp: 36 tablet, Rfl: 1    Multiple Vitamin (MULTIVITAMIN) tablet, Take 1 tablet by mouth daily, Disp: , Rfl:     Myrbetriq 50 MG TB24, TAKE 1 TABLET BY MOUTH EVERY DAY, Disp: 30 tablet, Rfl: 5    omeprazole (PriLOSEC) 20 mg delayed release capsule, Take 20 mg by mouth daily, Disp: , Rfl:     ondansetron (ZOFRAN) 8 mg tablet, Take 1 tablet (8 mg total) by mouth every 8 (eight) hours as needed for nausea or vomiting, Disp: 30 tablet, Rfl: 1    oxybutynin (DITROPAN XL) 15 MG 24 hr tablet, Take 1 tablet (15 mg total) by mouth daily at bedtime, Disp: 90 tablet, Rfl: 3    phenazopyridine (PYRIDIUM) 200 mg tablet, Take 1 tablet (200 mg total) by mouth daily as needed for bladder spasms, Disp: 30 tablet, Rfl: 0    quinapril (ACCUPRIL) 5 mg tablet, TAKE 1 TABLET BY MOUTH EVERYDAY AT BEDTIME, Disp: 90 tablet, Rfl: 1    rucaparib (RUBRACA) 300 mg tablet, Take 600 mg by mouth every 12 (twelve) hours Take with or without food  Do not repeat a vomited dose , Disp: , Rfl:     saccharomyces boulardii (FLORASTOR) 250 mg capsule, Take 1 capsule (250 mg total) by mouth 2 (two) times a day, Disp: , Rfl: 0    Sodium Chloride Flush (Normal Saline Flush) 0 9 % SOLN, , Disp: , Rfl:     sodium chloride, PF, 0 9 %, 10 mL by Intracatheter route daily for 30 doses 10cc syringes, Disp: 300 mL, Rfl: 3    venlafaxine (EFFEXOR) 75 mg tablet, Take 75 mg by mouth 3 (three) times a day  , Disp: , Rfl:   No current facility-administered medications for this visit      Facility-Administered Medications Ordered in Other Visits:     INV atezolizumab (INVESTIGATIONAL) 1,200 mg in sodium chloride 0 9 % 250 mL infusion, 1,200 mg, Intravenous, Once, Cassie Ferro MD    INV bevacizumab (INVESTIGATIONAL) 1,089 mg in sodium chloride 0 9 % 56 44 mL IVPB, 1,089 mg, Intravenous, Once, Cassie Ferro MD    sodium chloride 0 9 % infusion, 20 mL/hr, Intravenous, Continuous, Cassie Ferro MD    Most Recent Lab Results:  Lab Results   Component Value Date    WBC 7 11 07/16/2021    IRON 24 (L) 10/02/2019    TIBC 138 (L) 10/02/2019    FERRITIN 1,042 (H) 10/02/2019    CHOLESTEROL 176 07/16/2021    CHOL 183 10/27/2017    TRIG 88 11/01/2018    HDL 59 11/01/2018    LDLCALC 116 (H) 11/01/2018    ALT 21 07/16/2021    AST 15 07/16/2021    ALB 3 4 (L) 07/16/2021     10/27/2017     05/12/2017    SODIUM 136 07/16/2021    SODIUM 134 (L) 06/25/2021    K 3 8 07/16/2021    K 4 0 06/25/2021     07/16/2021    BUN 12 07/16/2021    BUN 18 06/25/2021    CREATININE 0 96 07/16/2021    CREATININE 1 04 06/25/2021    EGFR 63 07/16/2021    PHOS 3 3 07/16/2021    PHOS 3 5 06/25/2021    GLUCOSE 219 (H) 12/19/2017    POCGLU 97 08/18/2020    GLUF 105 (H) 04/02/2021    GLUF 108 (H) 03/12/2021    GLUC 97 07/16/2021    HGBA1C 4 8 07/07/2021    HGBA1C 5 1 02/03/2020    HGBA1C 6 2 08/28/2019    CALCIUM 9 5 07/16/2021    FOLATE 5 4 10/06/2019    MG 1 7 07/16/2021     Anthropometric Measurements:   Height: 59"  Ht Readings from Last 1 Encounters:   07/19/21 4' 11" (1 499 m)     Wt Readings from Last 20 Encounters:   07/19/21 73 2 kg (161 lb 6 oz)   07/16/21 72 6 kg (160 lb)   07/07/21 73 9 kg (163 lb)   07/06/21 75 8 kg (167 lb)   06/28/21 76 kg (167 lb 8 oz)   06/25/21 77 6 kg (171 lb)   06/08/21 77 1 kg (170 lb)   06/07/21 77 1 kg (170 lb)   06/04/21 78 9 kg (174 lb)   05/26/21 78 kg (172 lb)   05/17/21 80 7 kg (178 lb)   05/14/21 81 6 kg (180 lb)   04/26/21 81 9 kg (180 lb 8 oz)   04/23/21 82 6 kg (182 lb 1 6 oz)   04/05/21 84 1 kg (185 lb 8 oz)   04/02/21 84 1 kg (185 lb 8 oz)   03/15/21 84 8 kg (187 lb)   03/12/21 85 3 kg (188 lb)   02/22/21 86 2 kg (190 lb)   02/03/21 88 5 kg (195 lb)     Weight Hx:  · Usual Weight: 270# before RNYGB in 2001; lowest post-op wt: 200#; wt typically fluctuates between 215-230#  · Varian: (2/25/20) 185 6#, (3/3/20) 188 6#, (3/10/20) 187 2#, (3/19/20) 186 2#, (3/24/20) 187#, (3/31/20) 185 4#, (4/10/20) 184  4#   · Home Scale Wts: (2/19/20) 185#, (8/3/20) 194#    Oncology Nutrition-Anthropometrics      Nutrition from 7/19/2021 in [facility] Nutrition from 6/7/2021 in [facility]   Patient [age] (years):  63 years  63 years   Patient (female) height (in):  61 in  61 in   Current Weight to be used for anthropometric calculations (lbs)  161 4 lbs  170 lbs   Current Weight to be used for anthropometric calculations (kg)  73 4 kg  77 3 kg   BMI:  32 6  34 3   IBW female:  95 lbs  95 lbs   IBW (kg) female:  43 2 kg  43 2 kg   IBW % (female)  169 9 %  178 9 %   Adjusted BW (female):  111 6 lbs  113 8 lbs   Adjusted BW kg (female):  50 7 kg  51 7 kg   % weight change after 1 month:  (!) -5 6 %  (!) -5 6 %   Weight change after 1 month (lbs)  -9 6 lbs  -10 lbs   % weight change after 3 months:  (!) -11 4 %  (!) -9 6 %   Weight change after 3 months (lbs)  -20 7 lbs  -18 lbs   % weight change after 6 months:  --  (!) -13 3 %   Weight change after 6 months (lbs)  --  -26 lbs         Nutrition-Focused Physical Findings: mild  muscle depletion ([place]) - hollowing/scooping/depression     Food/Nutrition-Related History & Client/Social History:    Current Nutrition Impact Symptoms:   [] Nausea  [x] Reduced Appetite - "up and down" [] Acid Reflux   [] Vomiting  [x] Unintended Wt Loss - ongoing & significant; pt questioning if wt loss is d/t a psychological component; pt states she is now very concerned about degree of wt loss [] Malabsorption    [] Diarrhea  [] Unintended Wt Gain  [] Dumping Syndrome   [x] Constipation -  +BM EOD  -using Miralax daily and lactulose  -started apple prune sauce 6/11 - significantly helping per pt [] Thick Mucous/Secretions  [] Abdominal Pain    [] Dysgeusia (Altered Taste) [x] Xerostomia (Dry Mouth) - stable  -using Biotene toothpaste and mouthwash  Biotene has been effective    -Uses dry mouth lozenges prn     -not using bs/sw rinses yet, has recipe [] Gas    [] Dysosmia (Altered Smell)  [] Peola Cease  [] Difficulty Chewing    [x] Oral Mucositis (Sore Mouth) - on tongue for the past couple of days   -reviewed bs/sw rinses [x] Fatigue  [] Other:    [] Odynophagia   [] Esophagitis  [] Other:    [] Dysphagia [] Early Satiety  [] No Problems Eating      Food Allergies & Intolerances: yes: had skin patch testing which showed allergy to strawberries, but says she is able to eat strawberries without any side effects    Current Diet: Regular Diet, No Restrictions  Current Nutrition Intake: Decreased since last visit   Appetite: Fluctuating    Nutrition Route: PO  Meal planning/preparation mainly done by: mother does cooking; sister does shopping  Activity level: Mainly sedentary  Wants to start walking outside but limited by pain and medical condition  [facility] Recall: has not been keeping food journal  Breakfast: nonfat Greek yogurt with watermelon; yesterday: breakfast sandwich  Snack: none (ate a late breakfast yesterday)  Lunch: tomato sandwich with wise on wheat bread  Snack: 1 cup watermelon  Dinner: ordered out: 3 oz chicken breast and salad (lettuce, [place] dressing, tortilla strips, tomato, carrots) - tomato and dressing irritated mouth sores   Snack: handful of grapes, 2 cheese sticks    Beverages: water (16 9 oz x4 - trying to drink more water), Butch' regular unsweetened tea (32 oz x1), CIB (none, not sure why she isn't drinking these)   -Does not drink coffee or alcohol      -Does not separate fluids from solids, does not have discomfort while eating and drinking at the same time    Supplements:   Medtronic Essentials (6wxn=255 kcal, 5 g pro) mixed with 2% milk - none lately (prefers chocolate)   Does not like Ensure or Boost; may be open to making a homemade milkshake, has tried shake made with ice cream and peanut butter but did not like it      Oncology Nutrition-Estimated Needs      Nutrition from 7/19/2021 in [facility] Nutrition from 8/12/2020 in [facility]   Weight type used  Adjusted weight  Adjusted weight   Weight in kilograms (kg) used for estimated needs  --  54 1 kg   Weight in kilograms (kg) used for estimated needs  50 7 kg  --   Energy needs formula:   35-40 kcal/kg  25-30 kcal/kg   Energy needs based on 25 kcal/kg:  --  1352 kcal   Energy needs based on 30 kcal/kg:  --  1623 kcal   Energy needs based on 35 kcal/k kcal  --   Energy needs based on 40 kcal/k kcal  --   Protein needs formula:  1 5-2 g/kg  1-1 2 g/kg   Protein needs based on 1 g/kg  --  54 g   Protein needs based on 1 2 g/kg:  --  65 g   Protein needs based on 1 5 g/kg  76 g  --   Protein needs based on 2 g/kg  101 g  --   Fluid needs formula:  30-35 mL/kg  25-30 mL/kg   Fluid needs based on 25 mL/kg  --  1350 mL   Fluid needs in ounces  --  46 oz   Fluid needs based on 30 mL/kg  --  1620 mL   Fluid needs in ounces  --  55 oz   Fluid needs based on 30 mL/kg  1527 mL  --   Fluid needs in ounces  52 oz  --   Fluid needs based on 35 mL/kg  1782 mL  --   Fluid needs in ounces  60 oz  --        Discussion & Intervention:    Annette Man was evaluated today for an RD follow up regarding wt loss and pt request for dietitian involvement throughout tx  Annette Man is currently undergoing tx for endometrial cancer  Annette Man continues with ongoing and significant wt loss  Shows more s/s malnutrition: temporal wasting, fatigue, SOB with exertion  She reports that her appetite is "up and down"  She still feels constipated but overall this has improved  She is not keeping a food journal or using her CIB shakes as discussed on numerous occasions  She continues with xerostomia and now has sores on her tongue for the past few days; reviewed that bs/sw rises can help with these sx  She reports that she became very concerned about her wt loss after being weighed on Friday    She says she is starting to think there could be a psychological component to her weight loss and lack of follow through with RD recommendations as she says she has "always had a weight problem "  We reviewed the consequences of ongoing, unintended wt loss per pt request   She is planning to discuss her wt loss and potential psychological component with her counselor this week  Sammi Cerda does remain motivated to stay healthy and improve her nutritional status  Reviewed 24 hour recall, which revealed an inadequate po intake, and discussed ways to increase kcal, protein, and fluid intakes and optimize nutrient intake  Also reviewed the importance of wt management throughout the tx process and the role of a high kcal/ high protein diet in managing wt and overall health  Based on today's assessment, discussion included: MNT for: reduced appetite, wt loss, constipation, a high kcal/protein diet & food choices to include at all meals & snacks (Examples of high kcal foods: cheese, full-fat dairy products, nut butter, plant-based fats, coconut oil/milk, avocado, butter, cream soup, etc  Examples of high protein foods: eggs, chicken, fish, beans/legumes, nuts/nut butters, bone broth, etc ) , a post RNY Gastric Bypass diet & food choices to include at all meals & snacks, sipping on calorie containing beverages (examples include: adding whole milk or cream to coffee, oral nutrition supplements, juice, electrolyte replacement beverages, milk, etc ), eating smaller more frequent meals every 2-3 hours (5-6 small meals/day), having consistent and planned snacks between meals, increasing oral nutrition supplements and recipe suggestions/resources, trying new recipes, & cooking at home more often  Moving forward, Sammi Cerda was encouraged to increase kcal, protein, and fluid intakes and discuss her weight loss with her counselor for more insight  Materials Provided: not applicable  All questions and concerns addressed during todays visit  Sammi Cerda has RD contact information  Nutrition Diagnosis:  Inadequate Energy Intake related to physiological causes, disease state and treatment related issues as evidenced by food recall, wt loss and discussion with pt and/or family    Increased Nutrient Needs (kcal & pro) related to increased demand for nutrients and disease state as evidenced by cancer dx and pt undergoing tx for cancer  Altered GI Function related to alteration in GI tract motility as evidenced by Constipation  Patient has clinical indicators (or ASPEN criteria) consistent with severe protein-calorie malnutrition in the context of Chronic Illness as evidenced by >5% wt loss in 1 month, >7 5% wt loss in 3 months, </=75% energy intake vs  Estimated needs for >/=1 month and mild  muscle depletion ([place]) - hollowing/scooping/depression  Monitoring & Evaluation:   Goals:  · weight maintenance/stabilization  · adequate nutrition impact symptom management  · pt to meet >/=75% estimated nutrition needs daily   · Eat 5-6 small meals per day  · 2 oral nutrition supplements per day    · Progress Towards Goals: Not Met    Nutrition Rx & Recommendations:  · Diet: High Calorie, High Protein (for high calorie foods see pages 52-53, and for high protein foods see pages 49-51 in your Eating Hints book)  · Keep a daily food journal (pen/paper, dedra such as Cloudian)  · Small, frequent meals/snacks may be easier to tolerate than 3 large daily meals  Aim for 5-6 small meals per day (every 2-3 hours)  · Include protein at all meals/snacks  · Liquid nutrition may be better tolerated than solids at times  · Avoid spicy, acidic, sharp/hard/crunchy foods & carbonated beverages as needed  · Follow proper oral care; Try baking soda/salt water rinse recipe (mix 3/4 tsp salt + 1 tsp baking soda + 1 qt water; rinse with plain water after using) in Eating Hints book (pg 18)  Brush your teeth before/after meals & before bed    · For appetite loss: try powdered or liquid nutrition supplements; eating by the clock every 2-3 hours; set a timer to remind yourself to eat, keep snacks nearby; add extra protein & calories to your diet; drink liquids in between meals, choose liquids that add calories; eat a bedtime snack; eat soft, cool or frozen foods; eat a larger meal when you feel well & rested; only sip small amounts of liquids during meals (see pages 10-12 in your Eating Hints book)  · For weight loss: monitor your weight at home at least 2x/week, record your weight, start by adding 250-500 extra calories per day, eat 5-6 small scheduled meals every 2-3 hrs, choose foods that are high in protein and calories (see pages 49-53 in your Eating Hints book), drink liquids with calories for example: milkshakes/smoothies/juice/soup/whole milk/chocolate milk, cook with protein fortified milk (see recipe on page 36 in your Eating Hints book), consider ready-to-drink oral nutrition supplements such as Ensure Plus, Ensure Enlive, Boost Plus, or Boost Very High Calorie, avoid "diet" and "light" foods when possible, avoid drinking too much with meals, contact your dietitian with any continued weight loss over the course of 1 week  For more info see pages 35-37 in your Eating Hints book  · For constipation: drink plenty of fluids (>64 oz/day); drink hot liquids; eat high-fiber foods as tolerated (whole grains, beans, peas, nuts, seeds, fruits, vegetables, etc); increase physical activity as tolerated  Avoid increasing fiber intake too quickly, add fiber into your diet slowly; keep a record of your bowel movements (see pages 13-14 in you Eating Hints book)  · For sore mouth/throat: choose foods that are easy to chew/swallow; cook foods until they are soft/tender; moisten & soften foods (gravy/sauce/broth/yogurt); cut food into small pieces; drink with a straw (as tolerated); eat with a very small spoon; eat cold or room temperature food; suck on ice chips; Avoid citrus, spicy foods, tomatoes/ketchup, salty foods, raw vegetables, sharp/crunchy/hard foods & alcohol (see pages 23-28 in your Eating Hints book)  · Weigh yourself regularly  If you notice weight loss, make an effort to increase your daily food/calorie intake   If you continue to notice loss after these efforts, reach out to your dietitian to establish a plan to stabilize weight  · Nutrition Supplements: Total of 2 per day  [place] Instant Breakfast shake made with whole milk as snacks  Try making a homemade milkshake using CIB  · Start keeping a food journal   Plan your meals and snacks ahead of time  · Continue Apple Prune Sauce + 8 oz water each night before bed  · Try baking soda salt water rinses for dry mouth and mouth sores (the recipe is on page 18 of your Eating Hints book)  · Snack ideas: [place] yogurt with fresh fruit, veggies with humus, pretzels with peanut butter, grapes and cheese, watermelon with a cheese stick , etc   · Discuss weight loss with your counselor  · Lifetime vitamin/mineral supplementation recommended for Gastric Bypass Surgery:  · Multivitamin BID  · Iron 45 mg daily  · Vitamin D daily  · Calcium Citrate 500 mg TID (do not take with iron, needs to be 2 hrs apart from iron)  · B12 1000 mcg daily    Resources for recipes: Livemocha, cancerdietitian  com, oncolink org/support/nutrition-and-cancer, SkinNext Safety, www oncologynutrition  org/erfc/recipes, www cookforTeachbaseurlife  org, The Essential Cancer Treatment Nutrition Guide and Cookbook: Includes 853 Healthy and Delicious Recipes      Follow Up Plan: 8/9/21 during infusion   Recommend Referral to Other Providers: none at this time

## 2021-07-15 ENCOUNTER — TELEPHONE (OUTPATIENT)
Dept: GYNECOLOGIC ONCOLOGY | Facility: CLINIC | Age: 66
End: 2021-07-15

## 2021-07-15 NOTE — TELEPHONE ENCOUNTER
Left a message for patient to give office a call back  Patient appointment is needing to be moved to 9:30 am due to Dr Nechama Koyanagi having a meeting

## 2021-07-16 ENCOUNTER — APPOINTMENT (OUTPATIENT)
Dept: LAB | Facility: CLINIC | Age: 66
End: 2021-07-16
Payer: COMMERCIAL

## 2021-07-16 ENCOUNTER — DOCUMENTATION (OUTPATIENT)
Dept: OTHER | Facility: HOSPITAL | Age: 66
End: 2021-07-16

## 2021-07-16 ENCOUNTER — HOSPITAL ENCOUNTER (OUTPATIENT)
Dept: INFUSION CENTER | Facility: HOSPITAL | Age: 66
Discharge: HOME | End: 2021-07-16
Payer: COMMERCIAL

## 2021-07-16 ENCOUNTER — OFFICE VISIT (OUTPATIENT)
Dept: GYNECOLOGIC ONCOLOGY | Facility: CLINIC | Age: 66
End: 2021-07-16
Payer: COMMERCIAL

## 2021-07-16 VITALS
WEIGHT: 160 LBS | HEIGHT: 58 IN | DIASTOLIC BLOOD PRESSURE: 90 MMHG | SYSTOLIC BLOOD PRESSURE: 130 MMHG | HEART RATE: 73 BPM | TEMPERATURE: 97.6 F | BODY MASS INDEX: 33.58 KG/M2 | RESPIRATION RATE: 18 BRPM

## 2021-07-16 VITALS — TEMPERATURE: 98.2 F

## 2021-07-16 DIAGNOSIS — I82.4Y1 ACUTE DEEP VEIN THROMBOSIS (DVT) OF PROXIMAL VEIN OF RIGHT LOWER EXTREMITY (HCC): ICD-10-CM

## 2021-07-16 DIAGNOSIS — Z45.2 ENCOUNTER FOR CENTRAL LINE CARE: ICD-10-CM

## 2021-07-16 DIAGNOSIS — C54.1 ENDOMETRIAL CANCER (HCC): Primary | Chronic | ICD-10-CM

## 2021-07-16 DIAGNOSIS — C54.1 ENDOMETRIAL CANCER (HCC): Primary | ICD-10-CM

## 2021-07-16 LAB
ALBUMIN SERPL BCP-MCNC: 3.4 G/DL (ref 3.5–5)
ALP SERPL-CCNC: 144 U/L (ref 46–116)
ALT SERPL W P-5'-P-CCNC: 21 U/L (ref 12–78)
AMYLASE SERPL-CCNC: 30 IU/L (ref 25–115)
ANION GAP SERPL CALCULATED.3IONS-SCNC: 12 MMOL/L (ref 4–13)
APTT PPP: 62 SECONDS (ref 23–37)
AST SERPL W P-5'-P-CCNC: 15 U/L (ref 5–45)
BACTERIA UR QL AUTO: ABNORMAL /HPF
BASOPHILS # BLD AUTO: 0.05 THOUSANDS/ΜL (ref 0–0.1)
BASOPHILS NFR BLD AUTO: 1 % (ref 0–1)
BILIRUB SERPL-MCNC: 0.27 MG/DL (ref 0.2–1)
BILIRUB UR QL STRIP: ABNORMAL
BUN SERPL-MCNC: 12 MG/DL (ref 5–25)
CALCIUM ALBUM COR SERPL-MCNC: 10 MG/DL (ref 8.3–10.1)
CALCIUM SERPL-MCNC: 9.5 MG/DL (ref 8.3–10.1)
CHLORIDE SERPL-SCNC: 104 MMOL/L (ref 100–108)
CHOLEST SERPL-MCNC: 176 MG/DL (ref 50–200)
CLARITY UR: ABNORMAL
CO2 SERPL-SCNC: 20 MMOL/L (ref 21–32)
COLOR UR: ABNORMAL
CREAT SERPL-MCNC: 0.96 MG/DL (ref 0.6–1.3)
CREAT UR-MCNC: 13.9 MG/DL
EOSINOPHIL # BLD AUTO: 0.03 THOUSAND/ΜL (ref 0–0.61)
EOSINOPHIL NFR BLD AUTO: 0 % (ref 0–6)
ERYTHROCYTE [DISTWIDTH] IN BLOOD BY AUTOMATED COUNT: 14.4 % (ref 11.6–15.1)
GFR SERPL CREATININE-BSD FRML MDRD: 63 ML/MIN/1.73SQ M
GGT SERPL-CCNC: 12 U/L (ref 5–85)
GLUCOSE SERPL-MCNC: 97 MG/DL (ref 65–140)
GLUCOSE UR STRIP-MCNC: ABNORMAL MG/DL
HCT VFR BLD AUTO: 31.1 % (ref 34.8–46.1)
HGB BLD-MCNC: 10 G/DL (ref 11.5–15.4)
HGB UR QL STRIP.AUTO: ABNORMAL
IMM GRANULOCYTES # BLD AUTO: 0.06 THOUSAND/UL (ref 0–0.2)
IMM GRANULOCYTES NFR BLD AUTO: 1 % (ref 0–2)
INR PPP: 1.06 (ref 0.84–1.19)
KETONES UR STRIP-MCNC: ABNORMAL MG/DL
LEUKOCYTE ESTERASE UR QL STRIP: ABNORMAL
LIPASE SERPL-CCNC: 143 U/L (ref 73–393)
LYMPHOCYTES # BLD AUTO: 0.7 THOUSANDS/ΜL (ref 0.6–4.47)
LYMPHOCYTES NFR BLD AUTO: 10 % (ref 14–44)
MAGNESIUM SERPL-MCNC: 1.7 MG/DL (ref 1.6–2.6)
MCH RBC QN AUTO: 32.8 PG (ref 26.8–34.3)
MCHC RBC AUTO-ENTMCNC: 32.2 G/DL (ref 31.4–37.4)
MCV RBC AUTO: 102 FL (ref 82–98)
MONOCYTES # BLD AUTO: 0.85 THOUSAND/ΜL (ref 0.17–1.22)
MONOCYTES NFR BLD AUTO: 12 % (ref 4–12)
MUCOUS THREADS UR QL AUTO: ABNORMAL
NEUTROPHILS # BLD AUTO: 5.42 THOUSANDS/ΜL (ref 1.85–7.62)
NEUTS SEG NFR BLD AUTO: 76 % (ref 43–75)
NITRITE UR QL STRIP: ABNORMAL
NON-SQ EPI CELLS URNS QL MICRO: ABNORMAL /HPF
NRBC BLD AUTO-RTO: 0 /100 WBCS
PH UR STRIP.AUTO: ABNORMAL [PH]
PHOSPHATE SERPL-MCNC: 3.3 MG/DL (ref 2.3–4.1)
PLATELET # BLD AUTO: 290 THOUSANDS/UL (ref 149–390)
PMV BLD AUTO: 10.3 FL (ref 8.9–12.7)
POTASSIUM SERPL-SCNC: 3.8 MMOL/L (ref 3.5–5.3)
PROT SERPL-MCNC: 8.1 G/DL (ref 6.4–8.2)
PROT UR STRIP-MCNC: ABNORMAL MG/DL
PROT UR-MCNC: 644 MG/DL
PROT/CREAT UR: 46.33 MG/G{CREAT} (ref 0–0.1)
PROTHROMBIN TIME: 13.8 SECONDS (ref 11.6–14.5)
RBC # BLD AUTO: 3.05 MILLION/UL (ref 3.81–5.12)
RBC #/AREA URNS AUTO: ABNORMAL /HPF
SODIUM SERPL-SCNC: 136 MMOL/L (ref 136–145)
SP GR UR STRIP.AUTO: 1.01 (ref 1–1.03)
T3 SERPL-MCNC: 1.5 NG/ML (ref 0.6–1.8)
T4 FREE SERPL-MCNC: 1.18 NG/DL (ref 0.76–1.46)
TSH SERPL DL<=0.05 MIU/L-ACNC: 0.96 UIU/ML (ref 0.36–3.74)
UROBILINOGEN UR QL STRIP.AUTO: ABNORMAL E.U./DL
WBC # BLD AUTO: 7.11 THOUSAND/UL (ref 4.31–10.16)
WBC #/AREA URNS AUTO: ABNORMAL /HPF

## 2021-07-16 PROCEDURE — 85730 THROMBOPLASTIN TIME PARTIAL: CPT | Performed by: OBSTETRICS & GYNECOLOGY

## 2021-07-16 PROCEDURE — 85025 COMPLETE CBC W/AUTO DIFF WBC: CPT | Performed by: OBSTETRICS & GYNECOLOGY

## 2021-07-16 PROCEDURE — 82465 ASSAY BLD/SERUM CHOLESTEROL: CPT | Performed by: OBSTETRICS & GYNECOLOGY

## 2021-07-16 PROCEDURE — 84480 ASSAY TRIIODOTHYRONINE (T3): CPT | Performed by: OBSTETRICS & GYNECOLOGY

## 2021-07-16 PROCEDURE — 83735 ASSAY OF MAGNESIUM: CPT | Performed by: OBSTETRICS & GYNECOLOGY

## 2021-07-16 PROCEDURE — 80053 COMPREHEN METABOLIC PANEL: CPT | Performed by: OBSTETRICS & GYNECOLOGY

## 2021-07-16 PROCEDURE — 85610 PROTHROMBIN TIME: CPT | Performed by: OBSTETRICS & GYNECOLOGY

## 2021-07-16 PROCEDURE — 82570 ASSAY OF URINE CREATININE: CPT

## 2021-07-16 PROCEDURE — 84443 ASSAY THYROID STIM HORMONE: CPT | Performed by: OBSTETRICS & GYNECOLOGY

## 2021-07-16 PROCEDURE — 81001 URINALYSIS AUTO W/SCOPE: CPT

## 2021-07-16 PROCEDURE — 84156 ASSAY OF PROTEIN URINE: CPT

## 2021-07-16 PROCEDURE — 84100 ASSAY OF PHOSPHORUS: CPT | Performed by: OBSTETRICS & GYNECOLOGY

## 2021-07-16 PROCEDURE — 3008F BODY MASS INDEX DOCD: CPT | Performed by: INTERNAL MEDICINE

## 2021-07-16 PROCEDURE — 99214 OFFICE O/P EST MOD 30 MIN: CPT | Performed by: OBSTETRICS & GYNECOLOGY

## 2021-07-16 PROCEDURE — 83690 ASSAY OF LIPASE: CPT | Performed by: OBSTETRICS & GYNECOLOGY

## 2021-07-16 PROCEDURE — 82150 ASSAY OF AMYLASE: CPT | Performed by: OBSTETRICS & GYNECOLOGY

## 2021-07-16 PROCEDURE — 84439 ASSAY OF FREE THYROXINE: CPT | Performed by: OBSTETRICS & GYNECOLOGY

## 2021-07-16 PROCEDURE — 82977 ASSAY OF GGT: CPT | Performed by: OBSTETRICS & GYNECOLOGY

## 2021-07-16 RX ORDER — SODIUM CHLORIDE 9 MG/ML
20 INJECTION, SOLUTION INTRAVENOUS CONTINUOUS
Status: DISCONTINUED | OUTPATIENT
Start: 2021-07-19 | End: 2021-07-22 | Stop reason: HOSPADM

## 2021-07-16 NOTE — PATIENT INSTRUCTIONS
New dose of Lovenox will be 100 mg daily  Keep follow-up appointment per calendar  Call us if you have any questions  Go to the laboratory for urine test for protocol

## 2021-07-16 NOTE — PROGRESS NOTES
Patient is a participant of the clinical trial [id number] [id number], she was seen today for Cycle 11 Day 1 pretreatment visit  Her weight today was 72 6 kg, which has decreased 20% from 90 5 kg when starting study treatment on 12/21/2020  Patient has been responding to treatment and is otherwise doing well on study  Per the Primary Investigator (PI), Dr Kirsty Martini, patient is okay to proceed with Cycle 11 Day 1 treatment as planned

## 2021-07-16 NOTE — PROGRESS NOTES
Labs have been reviewed and signed by Dr Petros Barajas  Infusion orders have been reviewed and signed by Dr Fredo Lozano  Per protocol, patient is ok to proceed with Cycle 11 Day 1 treatment on Monday 7/19/2021

## 2021-07-16 NOTE — PROGRESS NOTES
Assessment/Plan:    Problem List Items Addressed This Visit        Cardiovascular and Mediastinum    Acute deep vein thrombosis (DVT) of proximal vein of lower extremity (HCC)       Patient needs therapeutic anticoagulation for life  On Lovenox  She had been receiving 120 mg daily corresponding to 1 5 milligram/kilogram   Now that her weight is 72 6 kg, will refill new prescription at 100 mg daily dosing  Patient counseled  All questions answered  Aware of warning symptoms and will call us if she has any shortness of breath, lower extremity swelling, etc          Relevant Medications    enoxaparin (Lovenox) 100 mg/mL       Genitourinary    Endometrial cancer ([facility] ) - Primary (Chronic)       Patient is tolerating treatment well  CT scan demonstrates stable disease  Overall not truly symptomatic from any disease burden at this point  Plan to continue treatment per protocol  Orders will be signed and treatment given as scheduled  Continue to monitor and treat per protocol  Will continue to check laboratory and clinical parameters prior to each infusion  CHIEF COMPLAINT:     Pre chemotherapy visit, follow-up on protocol  Problem:  Cancer Staging  Endometrial cancer [facility])  Staging form: Corpus Uteri - Carcinoma, AJCC 7th Edition  - Clinical stage from 12/19/2017: FIGO Stage IB (T1b, N0, M0) - Signed by Sushma Luz MD on 2/12/2018        Previous therapy:  Oncology History   Endometrial cancer ([facility] )   11/17/2017 Initial Diagnosis    Endometrial cancer ([facility] )     11/17/2017 Biopsy    ENDOMETRIAL BIOPSY: WELL DIFFERENTIATED ENDOMETRIAL ADENOCARCINOMA (FIGO I) WITH FOCALMUCINOUS FEATURES  Part B: ENDOCERVICAL POLYP:BENIGN ENDOCERVICAL      12/19/2017 Surgery    Robotic assisted total laparoscopic hysterectomy with bilateral salpingo-oophorectomy and sentinel bilateral pelvic lymph node dissection   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 (4 4 x 3 2 cm tumor, 9 4/15 4mm invasion, NO LVSI, washings revealed atypical cellular changes)     12/19/2017 Genetic Testing    Morrison testing negative     6/28/2019 Biopsy    A  Breast, Right, US BX Right Breast 1000 4 cmfn:  - Benign breast tissue with focal histiocytic aggregate  See comment   - Negative for atypia and in-situ or invasive carcinoma  7/8/2019 Recurrence    Presented with right lower extremity DVT and CT demonstrating right pelvic sidewall mass with venous, ureteral and nerve compression causing significant neuropathic pain  Biopsy:  Lymph Node, Right pelvic lymph node x3:  - High-grade adenocarcinoma  7/30/2019 - 1/6/2020 Chemotherapy    Taxol 175 mg/m2 and carboplatin AUC 6 every 21 days  Dose was reduced to taxol 135 mg/m2 and carboplatin AUC 5  Completed 6 cycles  Treatment protracted due to multiple hospital admissions  2/24/2020 - 4/13/2020 Radiation    adjuvant external beam radiation therapy to the whole pelvis to 4500 cGy followed by boost to gross disease of an additional 2200 cGy     11/23/2020 Progression    New necrotic adenopathy in the retroperitoneum on CT      12/21/2020 -  Chemotherapy    Began chemotherapy with rucaparib, atezolizumab, and bevacizumab as per [facility] clinical trial            Patient ID: Jammie Grijalva is a 63 y o  female  HPI    Patient receiving treatment on EndoBarr  Clinical trial and tolerating well  CT scan prior to today's visit per protocol shows stable disease  Patient has lost some weight and has seen nutrition  Other than this she is doing quite well  Reports occasional constipation associated pain  We discussed increasing dietary fiber Roxy Nuñez  Recent dysuria evaluated by Urology did not require antibiotics  Improved  Otherwise no complaints  On Lovenox for known DVT  GOG performance status is 1  Denies nausea vomiting  No other complaints    The following portions of the patient's history were reviewed and updated as appropriate: allergies, current medications, past family history, past medical history, past social history, past surgical history and problem list     Review of Systems    As per \[facility]\""  Twelve point review of systems otherwise unremarkable    Current Outpatient Medications   Medication Sig Dispense Refill    acetaminophen (TYLENOL) 325 mg tablet Take 2 tablets (650 mg total) by mouth every 6 (six) hours as needed for mild pain, headaches or fever 30 tablet 0    acetaminophen-codeine (TYLENOL #3) 300-30 mg per tablet Take 1 tablet by mouth every 6 (six) hours as needed for moderate pain 20 tablet 0    ARIPiprazole (ABILIFY) 10 mg tablet Take 10 mg by mouth daily      aspirin (ECOTRIN LOW STRENGTH) 81 mg EC tablet Take 81 mg by mouth daily      Calcium-Magnesium-Vitamin D (CALCIUM 500 PO) Take by mouth daily       cholecalciferol (VITAMIN D3) 1,000 units tablet Take 1,000 Units by mouth daily      Cyanocobalamin (VITAMIN B12 PO) Take by mouth daily       folic acid (FOLVITE) 1 mg tablet Take 1 tablet (1 mg total) by mouth daily  0    gemfibrozil (LOPID) 600 mg tablet TAKE 1 TABLET BY MOUTH EVERY DAY 90 tablet 1    lactulose 20 g/30 mL Take 45 mL (30 g total) by mouth 2 (two) times a day 300 mL 2    LORazepam (ATIVAN) 0 5 mg tablet Take 1 tablet (0 5 mg total) by mouth every 6 (six) hours as needed for anxiety (or nausea) 36 tablet 1    Multiple Vitamin (MULTIVITAMIN) tablet Take 1 tablet by mouth daily      Myrbetriq 50 MG TB24 TAKE 1 TABLET BY MOUTH EVERY DAY 30 tablet 5    omeprazole (PriLOSEC) 20 mg delayed release capsule Take 20 mg by mouth daily      ondansetron (ZOFRAN) 8 mg tablet Take 1 tablet (8 mg total) by mouth every 8 (eight) hours as needed for nausea or vomiting 30 tablet 1    oxybutynin (DITROPAN XL) 15 MG 24 hr tablet Take 1 tablet (15 mg total) by mouth daily at bedtime 90 tablet 3    phenazopyridine (PYRIDIUM) 200 mg tablet Take 1 tablet (200 mg total) by mouth daily as needed for bladder spasms 30 tablet 0    quinapril (ACCUPRIL) 5 mg tablet TAKE 1 TABLET BY MOUTH EVERYDAY AT BEDTIME 90 tablet 1    rucaparib (RUBRACA) 300 mg tablet Take 600 mg by mouth every 12 (twelve) hours Take with or without food  Do not repeat a vomited dose   saccharomyces boulardii (FLORASTOR) 250 mg capsule Take 1 capsule (250 mg total) by mouth 2 (two) times a day  0    Sodium Chloride Flush (Normal Saline Flush) 0 9 % SOLN       sodium chloride, PF, 0 9 % 10 mL by Intracatheter route daily for 30 doses 10cc syringes 300 mL 3    venlafaxine (EFFEXOR) 75 mg tablet Take 75 mg by mouth 3 (three) times a day        enoxaparin (Lovenox) 100 mg/mL Inject 1 mL (100 mg total) under the skin every 24 hours 90 mL 2     No current facility-administered medications for this visit  Objective:    Blood pressure 130/90, pulse 73, temperature 97 6 °F (36 4 °C), temperature source Tympanic, resp  rate 18, height 4' 10" (1 473 m), weight 72 6 kg (160 lb), not currently breastfeeding  Body mass index is 33 44 kg/m²  Body surface area is 1 66 meters squared  Physical Exam  Vitals reviewed  Exam conducted with a chaperone present  Constitutional:       Appearance: Normal appearance  She is obese  She is not ill-appearing or diaphoretic  Eyes:      General: No scleral icterus  Right eye: No discharge  Left eye: No discharge  Conjunctiva/sclera: Conjunctivae normal    Neck:      Comments: Port-A-Cath appears healthy  Cardiovascular:      Rate and Rhythm: Normal rate and regular rhythm  Heart sounds: Normal heart sounds  No murmur heard  Pulmonary:      Effort: Pulmonary effort is normal  No respiratory distress  Breath sounds: Normal breath sounds  No stridor  No wheezing  Abdominal:      General: There is no distension  Palpations: Abdomen is soft  Tenderness: There is no abdominal tenderness  There is no guarding  Comments:   Right PCN in place with clear urine output  Genitourinary:     Comments: Deferred  Musculoskeletal:      Cervical back: No rigidity  Right lower leg: No edema  Left lower leg: No edema  Neurological:      Mental Status: She is alert  Pre visit CT scan results reviewed  Report discussed with patient and family  All questions answered      Dionna Carrillo MD, Jeanette [address]  7/16/2021  9:54 AM

## 2021-07-16 NOTE — ASSESSMENT & PLAN NOTE
Patient is tolerating treatment well  CT scan demonstrates stable disease  Overall not truly symptomatic from any disease burden at this point  Plan to continue treatment per protocol  Orders will be signed and treatment given as scheduled  Continue to monitor and treat per protocol  Will continue to check laboratory and clinical parameters prior to each infusion

## 2021-07-16 NOTE — ASSESSMENT & PLAN NOTE
Patient needs therapeutic anticoagulation for life  On Lovenox  She had been receiving 120 mg daily corresponding to 1 5 milligram/kilogram   Now that her weight is 72 6 kg, will refill new prescription at 100 mg daily dosing  Patient counseled  All questions answered    Aware of warning symptoms and will call us if she has any shortness of breath, lower extremity swelling, etc

## 2021-07-16 NOTE — PROGRESS NOTES
Central labs from Dr Chente Gerber drawn, pt questioned getting clinical trial labs; however no tubes here to draw  Port flushed without incident    Declined AVS

## 2021-07-16 NOTE — PROGRESS NOTES
Patient was seen in the office for her Cycle 11 Day 1 pre treatment office visit with Dr Anniece Najjar  Patient AEs and Conmeds were reviewed  Patient is taking Miralax every other day to help with constipation, and Lactulose as needed  Dr Anniece Najjar recommended she increase Miralax up to twice daily, if needed to help with symptoms of constipation  Patient reports slight fatigue she attributes to her early morning appointments  Dr Anniece Najjar reviewed urology visit notes and most recent urinalysis culture, and confirms that patient does not have an active infection at this time  Her weight has decreased to 72 6 kg  Patient has no new AEs to report  She is overall doing well and tolerating treatment well

## 2021-07-19 ENCOUNTER — HOSPITAL ENCOUNTER (OUTPATIENT)
Dept: INFUSION CENTER | Facility: CLINIC | Age: 66
Discharge: HOME | End: 2021-07-19
Payer: COMMERCIAL

## 2021-07-19 ENCOUNTER — NUTRITION (OUTPATIENT)
Dept: NUTRITION | Facility: CLINIC | Age: 66
End: 2021-07-19

## 2021-07-19 ENCOUNTER — TELEPHONE (OUTPATIENT)
Dept: UROLOGY | Facility: CLINIC | Age: 66
End: 2021-07-19

## 2021-07-19 ENCOUNTER — DOCUMENTATION (OUTPATIENT)
Dept: OTHER | Facility: HOSPITAL | Age: 66
End: 2021-07-19

## 2021-07-19 VITALS
HEART RATE: 64 BPM | SYSTOLIC BLOOD PRESSURE: 148 MMHG | TEMPERATURE: 96.3 F | WEIGHT: 161.38 LBS | HEIGHT: 59 IN | RESPIRATION RATE: 20 BRPM | BODY MASS INDEX: 32.53 KG/M2 | DIASTOLIC BLOOD PRESSURE: 65 MMHG

## 2021-07-19 DIAGNOSIS — Z71.3 NUTRITIONAL COUNSELING: Primary | ICD-10-CM

## 2021-07-19 PROCEDURE — 96417 CHEMO IV INFUS EACH ADDL SEQ: CPT

## 2021-07-19 PROCEDURE — J9035Q0 INV BEVACIZUMAB 400MG/16ML 16 ML: Performed by: OBSTETRICS & GYNECOLOGY

## 2021-07-19 PROCEDURE — J9999Q0 INV ATEZOLIZUMAB 1200MG/20ML 20 ML: Performed by: OBSTETRICS & GYNECOLOGY

## 2021-07-19 PROCEDURE — 96413 CHEMO IV INFUSION 1 HR: CPT

## 2021-07-19 RX ADMIN — SODIUM CHLORIDE 20 ML/HR: 0.9 INJECTION, SOLUTION INTRAVENOUS at 08:21

## 2021-07-19 RX ADMIN — Medication 1089 MG: at 09:50

## 2021-07-19 RX ADMIN — Medication 1200 MG: at 09:15

## 2021-07-19 NOTE — PLAN OF CARE
Problem: Potential for Falls  Goal: Patient will remain free of falls  Description: INTERVENTIONS:  - Educate patient/family on patient safety including physical limitations  - Instruct patient to call for assistance with activity   - Consult OT/PT to assist with strengthening/mobility   - Keep Call bell within reach  - Keep bed low and locked with side rails adjusted as appropriate  - Keep care items and personal belongings within reach  - Initiate and maintain comfort rounds  - Make Fall Risk Sign visible to staff  - - Apply yellow socks and bracelet for high fall risk patients  - Consider moving patient to room near nurses station  Outcome: Progressing     Problem: PAIN - ADULT  Goal: Verbalizes/displays adequate comfort level or baseline comfort level  Description: Interventions:  - Encourage patient to monitor pain and request assistance  - Assess pain using appropriate pain scale  - Administer analgesics based on type and severity of pain and evaluate response  - Implement non-pharmacological measures as appropriate and evaluate response  - Consider cultural and social influences on pain and pain management  - Notify physician/advanced practitioner if interventions unsuccessful or patient reports new pain  Outcome: Progressing     Problem: Knowledge Deficit  Goal: Patient/family/caregiver demonstrates understanding of disease process, treatment plan, medications, and discharge instructions  Description: Complete learning assessment and assess knowledge base    Interventions:  - Provide teaching at level of understanding  - Provide teaching via preferred learning methods  Outcome: Progressing

## 2021-07-19 NOTE — PATIENT INSTRUCTIONS
Nutrition Rx & Recommendations:  · Diet: High Calorie, High Protein (for high calorie foods see pages 52-53, and for high protein foods see pages 49-51 in your Eating Hints book)  · Keep a daily food journal (pen/paper, dedra such as [employer]  · Small, frequent meals/snacks may be easier to tolerate than 3 large daily meals  Aim for 5-6 small meals per day (every 2-3 hours)  · Include protein at all meals/snacks  · Liquid nutrition may be better tolerated than solids at times  · Avoid spicy, acidic, sharp/hard/crunchy foods & carbonated beverages as needed  · Follow proper oral care; Try baking soda/salt water rinse recipe (mix 3/4 tsp salt + 1 tsp baking soda + 1 qt water; rinse with plain water after using) in Eating Hints book (pg 18)  Brush your teeth before/after meals & before bed  · For appetite loss: try powdered or liquid nutrition supplements; eating by the clock every 2-3 hours; set a timer to remind yourself to eat, keep snacks nearby; add extra protein & calories to your diet; drink liquids in between meals, choose liquids that add calories; eat a bedtime snack; eat soft, cool or frozen foods; eat a larger meal when you feel well & rested; only sip small amounts of liquids during meals (see pages 10-12 in your Eating Hints book)    · For weight loss: monitor your weight at home at least 2x/week, record your weight, start by adding 250-500 extra calories per day, eat 5-6 small scheduled meals every 2-3 hrs, choose foods that are high in protein and calories (see pages 49-53 in your Eating Hints book), drink liquids with calories for example: milkshakes/smoothies/juice/soup/whole milk/chocolate milk, cook with protein fortified milk (see recipe on page 36 in your Eating Hints book), consider ready-to-drink oral nutrition supplements such as Ensure Plus, Ensure Enlive, Boost Plus, or Boost Very High Calorie, avoid "diet" and "light" foods when possible, avoid drinking too much with meals, contact your dietitian with any continued weight loss over the course of 1 week  For more info see pages 35-37 in your Eating Hints book  · For constipation: drink plenty of fluids (>64 oz/day); drink hot liquids; eat high-fiber foods as tolerated (whole grains, beans, peas, nuts, seeds, fruits, vegetables, etc); increase physical activity as tolerated  Avoid increasing fiber intake too quickly, add fiber into your diet slowly; keep a record of your bowel movements (see pages 13-14 in you Eating Hints book)  · For sore mouth/throat: choose foods that are easy to chew/swallow; cook foods until they are soft/tender; moisten & soften foods (gravy/sauce/broth/yogurt); cut food into small pieces; drink with a straw (as tolerated); eat with a very small spoon; eat cold or room temperature food; suck on ice chips; Avoid citrus, spicy foods, tomatoes/ketchup, salty foods, raw vegetables, sharp/crunchy/hard foods & alcohol (see pages 23-28 in your Eating Hints book)  · Weigh yourself regularly  If you notice weight loss, make an effort to increase your daily food/calorie intake  If you continue to notice loss after these efforts, reach out to your dietitian to establish a plan to stabilize weight  · Nutrition Supplements: Total of 2 per day  [employer] Instant Breakfast shake made with whole milk as snacks  Try making a homemade milkshake using CIB  · Start keeping a food journal   Plan your meals and snacks ahead of time  · Continue Apple Prune Sauce + 8 oz water each night before bed  · Try baking soda salt water rinses for dry mouth and mouth sores (the recipe is on page 18 of your Eating Hints book)  · Snack ideas: [place] yogurt with fresh fruit, veggies with humus, pretzels with peanut butter, grapes and cheese, watermelon with a cheese stick , etc   · Discuss weight loss with your counselor    · Lifetime vitamin/mineral supplementation recommended for Gastric Bypass Surgery:  · Multivitamin BID  · Iron 45 mg daily  · Vitamin D daily  · Calcium Citrate 500 mg TID (do not take with iron, needs to be 2 hrs apart from iron)  · B12 1000 mcg daily    Resources for recipes: [employer], cancerdietitian  com, oncCloud9 IDE/support/nutrition-and-cancer, Skinnytaste  com, www oncologynutrition  org/erfc/recipes, www cookforEUROBOX  org, The Essential Cancer Treatment Nutrition Guide and Cookbook: Includes 326 Healthy and Delicious Recipes      Follow Up Plan: 8/9/21 during infusion   Recommend Referral to Other Providers: none at this time

## 2021-07-19 NOTE — PROGRESS NOTES
Patient here for cycle 11 day 1 research protocol treatment  She has been cleared to received this today  She does have an active UTI and per Dolph Hem patient is colonized and is not treated for this unless she has symptoms  Patient is reporting pain and pressure at her nephrostomy tube site as well as intermittent burning with urination  I did reach out again to Lady Shirley and am awaiting direction regarding this  Vern Rincon will reach out to finance regarding patient's lovenox as her insurance is refusing to pay for this  Patient made aware

## 2021-07-19 NOTE — TELEPHONE ENCOUNTER
----- Message from Harsh Jurado sent at 7/19/2021  8:54 AM EDT -----  Good morning,    Patient was last seen in June 2021 by Marion Joy, and is currently scheduled to see Adelia Lacey in January 2022  Is there a way she can be scheduled sooner? She has very frequent symptomatic UTI's  Thank you!!     Digna Gatica

## 2021-07-19 NOTE — PROGRESS NOTES
Patient tolerated treatment today without incident and was discharged post   She offered no c/o and declined AVS   Patient to be treated for UTI and is aware to  script sent in by Keke Gao post discharge  Patient will also be getting a phone call from urology to reschedule an appt for next year so she can be seen sooner per Lashae Orozco

## 2021-07-19 NOTE — PROGRESS NOTES
Patient was seen in [facility] for Cycle 11 Day 1 treatment  Patient was given Cycle 11 Rucaparib pills and drug diary, and patient returned Cycle 10 study pills and drug diary  Conmeds reviewed  Patient has increased the frequency of Miralax doses to help with her constipation  Her appetite is fair, and she will have another visit with Nutrition today  She has right-sided flank discomfort and occasional burning with urination that has slightly worsened over the past 2 days  Dahiana Villagomez 6 has ordered a urine culture and sent a prescription for Macrobid to the patient's pharmacy  Patient was told to call with any questions or concerns  Patient tolerated treatment without incident today

## 2021-07-19 NOTE — PATIENT INSTRUCTIONS
July 2021 Sunday Monday Tuesday Wednesday Thursday Friday Saturday                       1     2     3       4     5     6    FOLLOW UP PG  11:15 AM   (15 min )   Boris Peabody, [address] [facility] [place] 7    LAB WALK IN   8:50 AM   (5 min )   BE [facility] - [place] [address]    PHYSICAL PG   1:45 PM   (30 min )   Leigh Lomax, [facility] 8     9     10       11     12     13     14     15     16    INF BLOOD SPECIMENS-CENTRAL   7:30 AM   (30 min )   BE INF BED [address]    OFFICE VISIT SHORT PG   9:15 AM   (15 min )   Gabbi Rodriguez MD   [address] [facility] [place]    LAB WALK IN  10:35 AM   (5 min )   BE [facility] - [address] 17       18     19    INF CHEMO-CLINICAL TRIAL 2 HR   8:00 AM   (210 min )   AN INF CHAIR 9 [facility]    ONCNUT FOLLOW UP PG   8:45 AM   (60 min )   [address]   [place] [address] [facility] 88     19     76     98     24       79     50     15     57     78     92     39                 August 2021 Sunday Monday Tuesday Wednesday Thursday Friday Saturday   1     2     3     4     5     6    INF BLOOD SPECIMENS-CENTRAL   8:00 AM   (30 min )   BE INF QUICK CHAIR   [address]    OFFICE VISIT SHORT PG   8:15 AM   (15 min )   Lina Garcia, [facility] [place] 7       8     9    INF CHEMO-CLINICAL TRIAL 2 HR   8:00 AM   (210 min )   AN INF CHAIR 9 [facility] 10     11     12     13     14       15     16     17     18     19     20     21       22     23     24     25     26     27    INF BLOOD SPECIMENS-CENTRAL   8:00 AM   (30 min )   BE INF Yu Babar    OFFICE VISIT SHORT PG   8:15 AM   (15 min )   Meryle Armour, 42 [facility] [place] 28       29     30    INF CHEMO-CLINICAL TRIAL 2 HR   8:00 AM   (210 min )   AN INF CHAIR 2   [place] [id number]

## 2021-07-20 ENCOUNTER — DOCUMENTATION (OUTPATIENT)
Dept: OTHER | Facility: HOSPITAL | Age: 66
End: 2021-07-20

## 2021-07-20 DIAGNOSIS — C54.1 ENDOMETRIAL CANCER (HCC): Primary | ICD-10-CM

## 2021-07-20 NOTE — PROGRESS NOTES
Patient had urinalysis and urine protein/creatinine ratio on 7/16/2021  Results of urinalysis showed a lot of interference and the UPC ratio was 46 33, a dramatic increase from last sample result 9 days prior of 1 34 on 7/7/2021  Due to the discrepancy, Dr Vivek Metz will re-order urinalysis and UPC ratio and have the patient re-take the urine sample both via clean catch and through her PCN to verify the validity of the data

## 2021-07-21 ENCOUNTER — TELEPHONE (OUTPATIENT)
Dept: SURGICAL ONCOLOGY | Facility: CLINIC | Age: 66
End: 2021-07-21

## 2021-07-22 ENCOUNTER — APPOINTMENT (OUTPATIENT)
Dept: LAB | Facility: HOSPITAL | Age: 66
End: 2021-07-22
Payer: COMMERCIAL

## 2021-07-22 ENCOUNTER — TELEPHONE (OUTPATIENT)
Dept: GYNECOLOGIC ONCOLOGY | Facility: CLINIC | Age: 66
End: 2021-07-22

## 2021-07-22 DIAGNOSIS — C54.1 ENDOMETRIAL CANCER (HCC): ICD-10-CM

## 2021-07-22 LAB
BACTERIA UR QL AUTO: ABNORMAL /HPF
BILIRUB UR QL STRIP: NEGATIVE
CLARITY UR: ABNORMAL
COLOR UR: YELLOW
CREAT UR-MCNC: 65.4 MG/DL
GLUCOSE UR STRIP-MCNC: NEGATIVE MG/DL
HGB UR QL STRIP.AUTO: ABNORMAL
KETONES UR STRIP-MCNC: NEGATIVE MG/DL
LEUKOCYTE ESTERASE UR QL STRIP: ABNORMAL
NITRITE UR QL STRIP: NEGATIVE
NON-SQ EPI CELLS URNS QL MICRO: ABNORMAL /HPF
OTHER STN SPEC: ABNORMAL
PH UR STRIP.AUTO: 5.5 [PH]
PROT UR STRIP-MCNC: ABNORMAL MG/DL
PROT UR-MCNC: 116 MG/DL
PROT/CREAT UR: 1.77 MG/G{CREAT} (ref 0–0.1)
RBC #/AREA URNS AUTO: ABNORMAL /HPF
SP GR UR STRIP.AUTO: 1.01 (ref 1–1.03)
UROBILINOGEN UR QL STRIP.AUTO: 0.2 E.U./DL
WBC #/AREA URNS AUTO: ABNORMAL /HPF

## 2021-07-22 PROCEDURE — 84156 ASSAY OF PROTEIN URINE: CPT

## 2021-07-22 PROCEDURE — 81001 URINALYSIS AUTO W/SCOPE: CPT

## 2021-07-22 PROCEDURE — 82570 ASSAY OF URINE CREATININE: CPT

## 2021-07-22 NOTE — TELEPHONE ENCOUNTER
Notified patient that expedited request was submitted for lovenox 100 mg/ml  Per insurance, determination should be made in 1-2 hours   [facility]#[id number]

## 2021-07-22 NOTE — TELEPHONE ENCOUNTER
Patient is still having concerns about Lovenox and would like a call back at [facility]: [phone]

## 2021-07-22 NOTE — TELEPHONE ENCOUNTER
Return call placed to patient  Will continue to attempt prior auth for lovenox     Prior auth phone# [phone]

## 2021-07-23 DIAGNOSIS — K59.03 DRUG INDUCED CONSTIPATION: ICD-10-CM

## 2021-07-23 RX ORDER — LACTULOSE 10 G/15ML
SOLUTION ORAL
Qty: 300 ML | Refills: 2 | Status: SHIPPED | OUTPATIENT
Start: 2021-07-23 | End: 2022-03-18 | Stop reason: SDUPTHER

## 2021-07-30 NOTE — PROGRESS NOTES
Outpatient Oncology Nutrition Consult  Type of Consult: Follow Up  Care Location: [facility]   Nutrition Assessment:  Oncology Diagnosis & Treatments:   Endometrial cancer  -S/p surgery 12/19/17    -Recurrence 7/8/19    -S/p chemotherapy (carboplatin/taxol from 7/30/19-1/6/20)  -S/p whole pelvis RT (2/4/20- 4/13/20)  -Disease progression    -ENDOBAR trial started 12/21/20  Oncology History   Endometrial cancer ([facility] [address] )   11/17/2017 Initial Diagnosis    Endometrial cancer ([facility] [address] )     11/17/2017 Biopsy    ENDOMETRIAL BIOPSY: WELL DIFFERENTIATED ENDOMETRIAL ADENOCARCINOMA (FIGO I) WITH FOCALMUCINOUS FEATURES  Part B: ENDOCERVICAL POLYP:BENIGN ENDOCERVICAL      12/19/2017 Surgery    Robotic assisted total laparoscopic hysterectomy with bilateral salpingo-oophorectomy and sentinel bilateral pelvic lymph node dissection  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 (4 4 x 3 2 cm tumor, 9 4/15 4mm invasion, NO LVSI, washings revealed atypical cellular changes)     12/19/2017 Genetic Testing    Morrison testing negative     6/28/2019 Biopsy    A  Breast, Right, US BX Right Breast 1000 4 cmfn:  - Benign breast tissue with focal histiocytic aggregate  See comment   - Negative for atypia and in-situ or invasive carcinoma  7/8/2019 Recurrence    Presented with right lower extremity DVT and CT demonstrating right pelvic sidewall mass with venous, ureteral and nerve compression causing significant neuropathic pain  Biopsy:  Lymph Node, Right pelvic lymph node x3:  - High-grade adenocarcinoma  7/30/2019 - 1/6/2020 Chemotherapy    Taxol 175 mg/m2 and carboplatin AUC 6 every 21 days  Dose was reduced to taxol 135 mg/m2 and carboplatin AUC 5  Completed 6 cycles  Treatment protracted due to multiple hospital admissions       2/24/2020 - 4/13/2020 Radiation    adjuvant external beam radiation therapy to the whole pelvis to 4500 cGy followed by boost to gross disease of an additional 2200 cGy 11/23/2020 Progression    New necrotic adenopathy in the retroperitoneum on CT      12/21/2020 -  Chemotherapy    Began chemotherapy with rucaparib, atezolizumab, and bevacizumab as per [facility] clinical trial        PMH:  RNYGB (2001 at [address])    Past Medical History:   Diagnosis Date    Anemia     Chemotherapy follow-up examination     Depression     Endometrial cancer ([address] ) 12/2017    Hyperglycemia     vx type 2 dm -- last assessed 4/1/14; resolved 11/7/17    Hyperlipidemia     Hypertension     Obesity     last assessed 10/14/17; resolved 11/7/17     Past Surgical History:   Procedure Laterality Date    ABDOMINAL SURGERY      GASTRIC BYPASS    CHOLECYSTECTOMY      at the time of gastric bypass    COLONOSCOPY      CT NEEDLE BIOPSY LYMPH NODE  7/8/2019    FL GUIDED CENTRAL VENOUS ACCESS DEVICE INSERTION  11/12/2019    GASTRIC BYPASS      HYSTERECTOMY Bilateral     total abdominal with salpingo-oophorectomy    IR NEPHROSTOMY TUBE PLACEMENT  7/26/2019    IR NEPHROURETERAL STENT CHECK/CHANGE/REPOSITION  4/6/2021    IR NEPHROURETERAL STENT CHECK/CHANGE/REPOSITION  6/4/2021    IR PICC LINE  9/27/2019    IR PORT PLACEMENT  7/26/2019    IR PORT REMOVAL  9/20/2019    OOPHORECTOMY Bilateral     CA INSJ TUNNELED CTR VAD W/SUBQ PORT AGE 5 YR/> Left 11/12/2019    Procedure: INSERTION VENOUS PORT ( PORT-A-CATH) IR;  Surgeon: Saurav Berry DO;  Location: [facility];  Service: Interventional Radiology    CA LAP, RADICAL HYST W/ TUBE&OV, NODE BX N/A 12/19/2017    Procedure: HYSTERECTOMY LAPAROSCOPIC TOTAL ([address]) W/ ROBOTICS; BILATERAL SALPINGOOPHERECTOMY; LYMPH NODE DISSECTION; lysis of adhesions;  Surgeon: Dion Michel MD;  Location: [facility];  Service: Gynecology Oncology    CA LAP,DIAGNOSTIC ABDOMEN N/A 12/19/2017    Procedure: LAPAROSCOPY DIAGNOSTIC;  Surgeon: Dion Michel MD;  Location: [facility];  Service: Gynecology Oncology    TONSILLECTOMY      US GUIDED BREAST BIOPSY RIGHT COMPLETE Right 6/28/2019       Review of Medications:   Vitamins, Supplements and Herbals: yes: Hx RNYGB regimen: Vitamin D, Folic Acid, Align, calcium citrate TID, B12 1000 mcg QD, MVI BID (MVI does not have iron);  Iron 65 mg QD (2 hrs seperate from calcium)    Current Outpatient Medications:     acetaminophen (TYLENOL) 325 mg tablet, Take 2 tablets (650 mg total) by mouth every 6 (six) hours as needed for mild pain, headaches or fever (Patient not taking: Reported on 8/6/2021), Disp: 30 tablet, Rfl: 0    acetaminophen-codeine (TYLENOL #3) 300-30 mg per tablet, Take 1 tablet by mouth every 6 (six) hours as needed for moderate pain (Patient not taking: Reported on 8/6/2021), Disp: 20 tablet, Rfl: 0    ARIPiprazole (ABILIFY) 10 mg tablet, Take 10 mg by mouth daily, Disp: , Rfl:     aspirin (ECOTRIN LOW STRENGTH) 81 mg EC tablet, Take 81 mg by mouth daily, Disp: , Rfl:     Calcium-Magnesium-Vitamin D (CALCIUM 500 PO), Take by mouth daily , Disp: , Rfl:     cholecalciferol (VITAMIN D3) 1,000 units tablet, Take 1,000 Units by mouth daily, Disp: , Rfl:     Cyanocobalamin (VITAMIN B12 PO), Take by mouth daily , Disp: , Rfl:     enoxaparin (LOVENOX) 100 mg/mL, INJECT 1 ML (100 MG TOTAL) UNDER THE SKIN EVERY 24 HOURS, Disp: 90 mL, Rfl: 2    folic acid (FOLVITE) 1 mg tablet, Take 1 tablet (1 mg total) by mouth daily, Disp: , Rfl: 0    gemfibrozil (LOPID) 600 mg tablet, TAKE 1 TABLET BY MOUTH EVERY DAY, Disp: 90 tablet, Rfl: 1    lactulose (CHRONULAC) 10 g/15 mL solution, TAKE 45 ML (30 G TOTAL) BY MOUTH 2 (TWO) TIMES A DAY, Disp: 300 mL, Rfl: 2    LORazepam (ATIVAN) 0 5 mg tablet, Take 1 tablet (0 5 mg total) by mouth every 6 (six) hours as needed for anxiety (or nausea), Disp: 36 tablet, Rfl: 1    Multiple Vitamin (MULTIVITAMIN) tablet, Take 1 tablet by mouth daily, Disp: , Rfl:     Myrbetriq 50 MG TB24, TAKE 1 TABLET BY MOUTH EVERY DAY, Disp: 30 tablet, Rfl: 5    nitrofurantoin (MACROBID) 100 mg capsule, Take 1 capsule (100 mg total) by mouth 2 (two) times a day for 5 days, Disp: 10 capsule, Rfl: 0    omeprazole (PriLOSEC) 20 mg delayed release capsule, Take 20 mg by mouth daily, Disp: , Rfl:     ondansetron (ZOFRAN) 8 mg tablet, Take 1 tablet (8 mg total) by mouth every 8 (eight) hours as needed for nausea or vomiting, Disp: 30 tablet, Rfl: 1    oxybutynin (DITROPAN XL) 15 MG 24 hr tablet, Take 1 tablet (15 mg total) by mouth daily at bedtime, Disp: 90 tablet, Rfl: 3    phenazopyridine (PYRIDIUM) 200 mg tablet, Take 1 tablet (200 mg total) by mouth daily as needed for bladder spasms, Disp: 30 tablet, Rfl: 0    quinapril (ACCUPRIL) 5 mg tablet, TAKE 1 TABLET BY MOUTH EVERYDAY AT BEDTIME, Disp: 90 tablet, Rfl: 1    rucaparib (RUBRACA) 300 mg tablet, Take 600 mg by mouth every 12 (twelve) hours Take with or without food  Do not repeat a vomited dose , Disp: , Rfl:     saccharomyces boulardii (FLORASTOR) 250 mg capsule, Take 1 capsule (250 mg total) by mouth 2 (two) times a day, Disp: , Rfl: 0    Sodium Chloride Flush (Normal Saline Flush) 0 9 % SOLN, , Disp: , Rfl:     sodium chloride, PF, 0 9 %, 10 mL by Intracatheter route daily for 30 doses 10cc syringes, Disp: 300 mL, Rfl: 3    sulfamethoxazole-trimethoprim (BACTRIM DS) 800-160 mg per tablet, Take 1 tablet by mouth every 12 (twelve) hours for 5 days, Disp: 10 tablet, Rfl: 0    venlafaxine (EFFEXOR) 75 mg tablet, Take 75 mg by mouth 3 (three) times a day  , Disp: , Rfl:   No current facility-administered medications for this visit      Facility-Administered Medications Ordered in Other Visits:     INV atezolizumab (INVESTIGATIONAL) 1,200 mg in sodium chloride 0 9 % 250 mL infusion, 1,200 mg, Intravenous, Once, Roseline Corral MD    INV bevacizumab (INVESTIGATIONAL) 1,089 mg in sodium chloride 0 9 % 56 44 mL IVPB, 1,089 mg, Intravenous, Once, Roseline Corral MD    sodium chloride 0 9 % infusion, 20 mL/hr, Intravenous, Continuous, Roseline Corral MD, Last Rate: 20 mL/hr at 08/09/21 0814, 20 mL/hr at 08/09/21 0814    Most Recent Lab Results:  Lab Results   Component Value Date    WBC 7 56 08/06/2021    IRON 24 (L) 10/02/2019    TIBC 138 (L) 10/02/2019    FERRITIN 1,042 (H) 10/02/2019    CHOLESTEROL 186 08/06/2021    CHOL 183 10/27/2017    TRIG 88 11/01/2018    HDL 59 11/01/2018    LDLCALC 116 (H) 11/01/2018    ALT 19 08/06/2021    AST 10 08/06/2021    ALB 3 4 (L) 08/06/2021     10/27/2017     05/12/2017    SODIUM 135 (L) 08/06/2021    SODIUM 136 07/16/2021    K 3 9 08/06/2021    K 3 8 07/16/2021     08/06/2021    BUN 16 08/06/2021    BUN 12 07/16/2021    CREATININE 0 81 08/06/2021    CREATININE 0 96 07/16/2021    EGFR 78 08/06/2021    PHOS 3 9 08/06/2021    PHOS 3 3 07/16/2021    GLUCOSE 219 (H) 12/19/2017    POCGLU 97 08/18/2020    GLUF 105 (H) 04/02/2021    GLUF 108 (H) 03/12/2021    GLUC 102 08/06/2021    HGBA1C 4 8 07/07/2021    HGBA1C 5 1 02/03/2020    HGBA1C 6 2 08/28/2019    CALCIUM 9 6 08/06/2021    FOLATE 5 4 10/06/2019    MG 1 8 08/06/2021     Anthropometric Measurements:   Height: 59"  Ht Readings from Last 1 Encounters:   08/09/21 4' 11 21" (1 504 m)     Wt Readings from Last 20 Encounters:   08/09/21 70 8 kg (156 lb)   08/06/21 73 5 kg (162 lb)   08/04/21 73 kg (161 lb)   07/19/21 73 2 kg (161 lb 6 oz)   07/16/21 72 6 kg (160 lb)   07/07/21 73 9 kg (163 lb)   07/06/21 75 8 kg (167 lb)   06/28/21 76 kg (167 lb 8 oz)   06/25/21 77 6 kg (171 lb)   06/08/21 77 1 kg (170 lb)   06/07/21 77 1 kg (170 lb)   06/04/21 78 9 kg (174 lb)   05/26/21 78 kg (172 lb)   05/17/21 80 7 kg (178 lb)   05/14/21 81 6 kg (180 lb)   04/26/21 81 9 kg (180 lb 8 oz)   04/23/21 82 6 kg (182 lb 1 6 oz)   04/05/21 84 1 kg (185 lb 8 oz)   04/02/21 84 1 kg (185 lb 8 oz)   03/15/21 84 8 kg (187 lb)     Weight Hx:  · Usual Weight: 270# before RNYGB in 2001; lowest post-op wt: 200#; wt typically fluctuates between 215-230#  · Varian: (2/25/20) 185 6#, (3/3/20) 188 6#, (3/10/20) 187 2#, (3/19/20) 186 2#, (3/24/20) 187#, (3/31/20) 185 4#, (4/10/20) 184  4#   · Home Scale Wts: (2/19/20) 185#, (8/3/20) 194#    Oncology Nutrition-Anthropometrics      Nutrition from 8/9/2021 in [place] [address] Oncology Dietitian Services Nutrition from 7/19/2021 in [facility] Oncology Dietitian Services   Patient [age] (years):  63 years  63 years   Patient (female) height (in):  61 in  61 in   Current Weight to be used for anthropometric calculations (lbs)  156 lbs  161 4 lbs   Current Weight to be used for anthropometric calculations (kg)  70 9 kg  73 4 kg   BMI:  31 5  32 6   IBW female:  95 lbs  95 lbs   IBW (kg) female:  43 2 kg  43 2 kg   IBW % (female)  164 2 %  169 9 %   Adjusted BW (female):  110 2 lbs  111 6 lbs   Adjusted BW kg (female):  50 1 kg  50 7 kg   % weight change after 1 week:  (!) -3 1 %  --   Weight change after 1 week (lbs)  -5 lbs  --   % weight change after 1 month:  -4 3 %  (!) -5 6 %   Weight change after 1 month (lbs)  -7 lbs  -9 6 lbs   % weight change after 3 months:  (!) -13 3 %  (!) -11 4 %   Weight change after 3 months (lbs)  -24 lbs  -20 7 lbs         Nutrition-Focused Physical Findings: mild  muscle depletion ([place]) - hollowing/scooping/depression     Food/Nutrition-Related History & Client/Social History:    Current Nutrition Impact Symptoms:   [] Nausea  [x] Reduced Appetite - significantly better now that she is having daily BM's [] Acid Reflux   [] Vomiting  [x] Unintended Wt Loss - ongoing & significant  -pt has been discussing psychological component to wt loss with her therapist  -pt questions accuracy of today's wt, she will spot check her wt at home between now and next visit [] Malabsorption    [] Diarrhea  [] Unintended Wt Gain  [] Dumping Syndrome   [x] Constipation -  +BM ED  -using Miralax daily and lactulose [] Thick Mucous/Secretions  [] Abdominal Pain    [] Dysgeusia (Altered Taste) [x] Xerostomia (Dry Mouth) - stable  -using Biotene toothpaste and mouthwash  Biotene has been effective  -Uses dry mouth lozenges prn     -not using bs/sw rinses yet, has recipe [] Gas    [] Dysosmia (Altered Smell)  [] Alejandrina Copier  [] Difficulty Chewing    [x] Oral Mucositis (Sore Mouth) - improved, not bothering her as much [x] Fatigue - improved [] Other:    [] Odynophagia   [] Esophagitis  [] Other:    [] Dysphagia [] Early Satiety  [] No Problems Eating      Food Allergies & Intolerances: yes: had skin patch testing which showed allergy to strawberries, but says she is able to eat strawberries without any side effects    Current Diet: Regular Diet, No Restrictions  Current Nutrition Intake: Increased since last visit   Appetite: Good    Nutrition Route: PO  Meal planning/preparation mainly done by: mother does cooking; sister does shopping  Activity level: Mainly sedentary  Wants to start walking outside but limited by pain and medical condition  24 Hr Diet Recall:  Breakfast: nonfat Greek yogurt with watermelon; brought Special K Protein cereal, considering starting 2% Fairlife milk  Snack: watermelon, cheese  Lunch: 1 cup homemade beef barley soup  Snack: cheese stick  Dinner: ordered out: kids beef lasagna  Snack: watermelon, peppers, cheese stick    Beverages: water (16 9 oz x4), Butch' regular unsweetened tea (32 oz x1)   -Does not drink coffee or alcohol      -Does not separate fluids from solids, does not have discomfort while eating and drinking at the same time    Supplements:   Medtronic Essentials (5tfv=649 kcal, 5 g pro) mixed with 2% milk - not consistent (prefers vanilla)   Protein Powder, Orgain - has not purchased these yet; Provided strawberry Unjury protein powder samples today   Does not like Ensure or Boost; may be open to making a homemade milkshake, has tried shake made with ice cream + pb but did not like it    Oncology Nutrition-Estimated Needs      Nutrition from 7/19/2021 in [facility] [phone]  Oncology Dietitian Services Nutrition from 2020 in [facility] Oncology Dietitian Services   Weight type used  Adjusted weight  Adjusted weight   Weight in kilograms (kg) used for estimated needs  --  54 1 kg   Weight in kilograms (kg) used for estimated needs  50 7 kg  --   Energy needs formula:   35-40 kcal/kg  25-30 kcal/kg   Energy needs based on 25 kcal/kg:  --  1352 kcal   Energy needs based on 30 kcal/kg:  --  1623 kcal   Energy needs based on 35 kcal/k kcal  --   Energy needs based on 40 kcal/k kcal  --   Protein needs formula:  1 5-2 g/kg  1-1 2 g/kg   Protein needs based on 1 g/kg  --  54 g   Protein needs based on 1 2 g/kg:  --  65 g   Protein needs based on 1 5 g/kg  76 g  --   Protein needs based on 2 g/kg  101 g  --   Fluid needs formula:  30-35 mL/kg  25-30 mL/kg   Fluid needs based on 25 mL/kg  --  1350 mL   Fluid needs in ounces  --  46 oz   Fluid needs based on 30 mL/kg  --  1620 mL   Fluid needs in ounces  --  55 oz   Fluid needs based on 30 mL/kg  1527 mL  --   Fluid needs in ounces  52 oz  --   Fluid needs based on 35 mL/kg  1782 mL  --   Fluid needs in ounces  60 oz  --        Discussion & Intervention:    Zuleima Quiñones was evaluated today for an RD follow up regarding wt loss and pt request for dietitian involvement throughout tx  Zuleima Quiñones is currently undergoing tx for endometrial cancer  Zuleima Quiñones continues with ongoing wt loss, however, she is questioning the accuracy of today's wt  She will try to spot check her wt at home between now and next visit  She reports improved BM's, which are now daily, and this has positively affected her appetite  She is not using oral nutrition supplements consistently but still wants to try protein powders  She is trying to increase her protein intake  She discussed her wt loss with her therapist and is trying to gain a better insight about the psychological factors that may be contributing    She is going on vacation to [facility] next week and looking forward to her trip  Reviewed 24 hour recall, which revealed an inadequate po intake, and discussed ways to increase kcal, protein, and fluid intakes and optimize nutrient intake  Also reviewed the importance of wt management throughout the tx process and the role of a high kcal/ high protein diet in managing wt and overall health  Based on today's assessment, discussion included: MNT for: wt loss, a high kcal/protein diet & food choices to include at all meals & snacks (Examples of high kcal foods: cheese, full-fat dairy products, nut butter, plant-based fats, coconut oil/milk, avocado, butter, cream soup, etc  Examples of high protein foods: eggs, chicken, fish, beans/legumes, nuts/nut butters, bone broth, etc ) , a post RNY Gastric Bypass diet & food choices to include at all meals & snacks, sipping on calorie containing beverages (examples include: adding whole milk or cream to coffee, oral nutrition supplements, juice, electrolyte replacement beverages, milk, etc ), eating smaller more frequent meals every 2-3 hours (5-6 small meals/day), having consistent and planned snacks between meals, starting oral nutrition supplements and recipe suggestions/resources, trying new recipes, & cooking at home more often     Moving forward, Erichchristopher oCrdero was encouraged to increase kcal, protein, and fluid intakes  Materials Provided: not applicable  All questions and concerns addressed during todays visit  Lilliam Cordero has RD contact information  Nutrition Diagnosis:  Inadequate Energy Intake related to physiological causes, disease state and treatment related issues as evidenced by food recall, wt loss and discussion with pt and/or family  Increased Nutrient Needs (kcal & pro) related to increased demand for nutrients and disease state as evidenced by cancer dx and pt undergoing tx for cancer    Patient has clinical indicators (or ASPEN criteria) consistent with severe protein-calorie malnutrition in the context of Chronic Illness as evidenced by >7 5% wt loss in 3 months, </=75% energy intake vs  Estimated needs for >/=1 month and mild  muscle depletion ([place]) - hollowing/scooping/depression  Monitoring & Evaluation:   Goals:  · weight maintenance/stabilization  · adequate nutrition impact symptom management  · pt to meet >/=75% estimated nutrition needs daily   · Eat 5-6 small meals per day  · Try protein powder mixed with Fairlife milk    · Progress Towards Goals: Progressing and New Goal(s) Added    Nutrition Rx & Recommendations:  · Diet: High Calorie, High Protein (for high calorie foods see pages 52-53, and for high protein foods see pages 49-51 in your Eating Hints book)  · Small, frequent meals/snacks may be easier to tolerate than 3 large daily meals  Aim for 5-6 small meals per day (every 2-3 hours)  · Include protein at all meals/snacks  · Avoid spicy, acidic, sharp/hard/crunchy foods & carbonated beverages as needed  · Follow proper oral care; Try baking soda/salt water rinse recipe (mix 3/4 tsp salt + 1 tsp baking soda + 1 qt water; rinse with plain water after using) in Eating Hints book (pg 18)  Brush your teeth before/after meals & before bed    · For weight loss: monitor your weight at home at least 2x/week, record your weight, start by adding 250-500 extra calories per day, eat 5-6 small scheduled meals every 2-3 hrs, choose foods that are high in protein and calories (see pages 49-53 in your Eating Hints book), drink liquids with calories for example: milkshakes/smoothies/juice/soup/whole milk/chocolate milk, cook with protein fortified milk (see recipe on page 36 in your Eating Hints book), consider ready-to-drink oral nutrition supplements such as Ensure Plus, Ensure Enlive, Boost Plus, or Boost Very High Calorie, avoid "diet" and "light" foods when possible, avoid drinking too much with meals, contact your dietitian with any continued weight loss over the course of 1 week  For more info see pages 35-37 in your Eating Hints book  · Weigh yourself regularly  If you notice weight loss, make an effort to increase your daily food/calorie intake  If you continue to notice loss after these efforts, reach out to your dietitian to establish a plan to stabilize weight  · Nutrition Supplements:  Try protein powder mixed with 2% Fairlife milk  Try to drink 1-2 per day  · Snack ideas: [place] yogurt with fresh fruit, veggies with humus, pretzels with peanut butter, grapes and cheese, watermelon with a cheese stick , etc   · Lifetime vitamin/mineral supplementation recommended for Gastric Bypass Surgery:  · Multivitamin BID  · Iron 45 mg daily  · Vitamin D daily  · Calcium Citrate 500 mg TID (do not take with iron, needs to be 2 hrs apart from iron)  · B12 1000 mcg daily    Resources for recipes: Blaze, cancerdietitian  com, oncBabyGlowz org/support/nutrition-and-cancer, Skinnytaste  com, www oncologynutrition  org/erfc/recipes, www cookforyourlife  org, The Essential Cancer Treatment Nutrition Guide and Cookbook: Includes 381 Healthy and Delicious Recipes      Follow Up Plan: 8/30/21 during infusion   Recommend Referral to Other Providers: none at this time

## 2021-08-02 ENCOUNTER — TELEPHONE (OUTPATIENT)
Dept: NUTRITION | Facility: CLINIC | Age: 66
End: 2021-08-02

## 2021-08-02 NOTE — TELEPHONE ENCOUNTER
Phone call received from pt today  She has questions about using protein powder and a cereal she saw on a TV ad  She says she has been using vanilla CIB which is going well and that she tends to tolerate the vanilla flavor better than the chocolate  She is wondering if she can use protein powder  Discussed what to look for in terms of protein powder and suggested looking into the Orgain brand  Encouraged her to look for a protein powder that is higher in both calories and protein, choose a powder that does not have stimulants added, looking for a short ingredient list, and use protein powders in homemade milkshakes and smoothies (recipes to be provided)  She says she saw an ad for a cereal with 13 grams protein, but is unsure which brand  Suggested looking into both [place] and Special K Protein cereals for higher protein cereal options  She reported that her BM's have been ok and she is no longer using the apple/prune sauce at hs  She says she is eating more vegetables  All questions and concerns addressed at this time  E-mailed to pt ([email or web address]'): Oncology Milkshake & Smoothie Recipes    Follow up plan reviewed: 8/9/21 during infusion

## 2021-08-04 ENCOUNTER — OFFICE VISIT (OUTPATIENT)
Dept: UROLOGY | Facility: AMBULATORY SURGERY CENTER | Age: 66
End: 2021-08-04
Payer: COMMERCIAL

## 2021-08-04 ENCOUNTER — APPOINTMENT (OUTPATIENT)
Dept: LAB | Facility: CLINIC | Age: 66
End: 2021-08-04
Payer: COMMERCIAL

## 2021-08-04 VITALS
HEART RATE: 72 BPM | SYSTOLIC BLOOD PRESSURE: 120 MMHG | DIASTOLIC BLOOD PRESSURE: 58 MMHG | BODY MASS INDEX: 32.52 KG/M2 | WEIGHT: 161 LBS

## 2021-08-04 DIAGNOSIS — N39.0 RECURRENT UTI: ICD-10-CM

## 2021-08-04 DIAGNOSIS — N39.0 URINARY TRACT INFECTION WITHOUT HEMATURIA, SITE UNSPECIFIED: Primary | ICD-10-CM

## 2021-08-04 DIAGNOSIS — N39.0 URINARY TRACT INFECTION WITHOUT HEMATURIA, SITE UNSPECIFIED: ICD-10-CM

## 2021-08-04 DIAGNOSIS — C54.1 ENDOMETRIAL CANCER (HCC): Primary | ICD-10-CM

## 2021-08-04 LAB
POST-VOID RESIDUAL VOLUME, ML POC: 33 ML
SL AMB  POCT GLUCOSE, UA: NORMAL
SL AMB LEUKOCYTE ESTERASE,UA: NORMAL
SL AMB POCT BILIRUBIN,UA: NORMAL
SL AMB POCT BLOOD,UA: NORMAL
SL AMB POCT CLARITY,UA: CLEAR
SL AMB POCT COLOR,UA: YELLOW
SL AMB POCT KETONES,UA: NORMAL
SL AMB POCT NITRITE,UA: NORMAL
SL AMB POCT PH,UA: 5
SL AMB POCT SPECIFIC GRAVITY,UA: 1.02
SL AMB POCT URINE PROTEIN: NORMAL
SL AMB POCT UROBILINOGEN: 0.2

## 2021-08-04 PROCEDURE — 99213 OFFICE O/P EST LOW 20 MIN: CPT | Performed by: NURSE PRACTITIONER

## 2021-08-04 PROCEDURE — 87086 URINE CULTURE/COLONY COUNT: CPT

## 2021-08-04 PROCEDURE — 87077 CULTURE AEROBIC IDENTIFY: CPT

## 2021-08-04 PROCEDURE — 81002 URINALYSIS NONAUTO W/O SCOPE: CPT | Performed by: NURSE PRACTITIONER

## 2021-08-04 PROCEDURE — 51798 US URINE CAPACITY MEASURE: CPT | Performed by: NURSE PRACTITIONER

## 2021-08-04 NOTE — PROGRESS NOTES
08/04/21    Debra Melendez   1955   [id number]     Assessment  1 Right ureteral obstruction s/p right nephrostomy tube  2 OAB symptoms  3 Atrophic vaginitis  4 Constipation  5 Recurrent UTI/dysuria    Discussion/Plan  1 Right ureteral obstruction s/p right nephrostomy tube  Exchanged in Intervention Radiology 06/04/2021  Next exchange schedule for 09/3/2021  Nephrostomy tube site is clean, dry, intact  Tylenol for 1 week with discomfort at the site   2 OAB symptoms  Continue Oxybutynin and Myrbetriq  Encouraged increased hydration of 48 oz water daily  3  Atrophic vaginitis  Avoid Estrace cream per Oncology   Continue Replens and Aquaphor for irritation  4 Constipation  Miralax BID PRN  We discussed dietary fiber choices and hydration  5 Recurrent UTI/dysuria   Urine dip in office today: positive leukocytes, positive blood     Insufficient volume of urine to send to lab for culture  Patient will drop off a sample at the lab today  Sterile sample cup provided    PVR 33   Referral to Infectious Disease for Recurrent UTI and MDRO    She will present to IR 9/3/21 for nephrostomy tube exchange  Await urine studies  ID referral  She will be notified if urine culture is positive and antibiotics are needed  Patient will return in 6 months for scheduled follow up with Tyra Caceres, [address]  All questions answered at this time  Subjective  HPI   Sofia Nick is a 63 y o  female here for follow up evaluation of hydronephrosis secondary to endometrial cancer  She is undergoing chemotherapy and clinical trials for advancement of disease  She does have a nephrostomy tube placed by Interventional Radiology 06/04/2021  She has overactive bladder symptoms managed on oxybutynin and myrbetriq which has reduced her incontinence and frequency of urination  She has history of recurrent UTIs   She was previously evaluated in the office and it was recommended that she increase hydration with water, begin bowel regimen and use Replens or coconut oil as lubricant for atrophic vaginitis symptoms  Estrace cream is contraindicated due to history of endometrial cancer  She reports mild discomfort at nephrostomy tube site  She believes she has a UTI  PMH: HTN, obesity, dyslipidemia, depression, ambulatory dysfunction, anemia, weakness, chronic pain, constipation, nephrostomy     Review of Systems - History obtained from chart review and the patient  General ROS: negative  Psychological ROS: negative  Ophthalmic ROS: negative  ENT ROS: negative  Allergy and Immunology ROS: negative  Endocrine ROS: negative  Breast ROS: negative for breast lumps  Respiratory ROS: no cough, shortness of breath, or wheezing  Cardiovascular ROS: no chest pain or dyspnea on exertion  Gastrointestinal ROS: no abdominal pain, change in bowel habits, or black or bloody stools  Genito-Urinary ROS: positive for - dysuria  Musculoskeletal ROS: positive for - right flank discomfort  Neurological ROS: negative  Dermatological ROS: negative     Objective  Physical Exam  Vitals and nursing note reviewed  Constitutional:       General: She is not in acute distress  Appearance: Normal appearance  She is normal weight  She is not ill-appearing, toxic-appearing or diaphoretic  HENT:      Head: Normocephalic and atraumatic  Pulmonary:      Effort: Pulmonary effort is normal  No respiratory distress  Musculoskeletal:         General: Normal range of motion  Cervical back: Normal range of motion  Skin:     General: Skin is warm and dry  Neurological:      General: No focal deficit present  Mental Status: She is alert and oriented to person, place, and time  Mental status is at baseline  Psychiatric:         Mood and Affect: Mood normal          Behavior: Behavior normal          Thought Content:  Thought content normal          Judgment: Judgment normal        Component      Latest Ref Rng & Units 8/1/2019 9/12/2019 8/4/2021   POST-VOID RESIDUAL VOLUME, ML POC      mL 33 30 Kaden Zane Morales [address]

## 2021-08-06 ENCOUNTER — DOCUMENTATION (OUTPATIENT)
Dept: OTHER | Facility: HOSPITAL | Age: 66
End: 2021-08-06

## 2021-08-06 ENCOUNTER — TELEPHONE (OUTPATIENT)
Dept: HEMATOLOGY ONCOLOGY | Facility: CLINIC | Age: 66
End: 2021-08-06

## 2021-08-06 ENCOUNTER — HOSPITAL ENCOUNTER (OUTPATIENT)
Dept: INFUSION CENTER | Facility: HOSPITAL | Age: 66
Discharge: HOME | End: 2021-08-06
Payer: COMMERCIAL

## 2021-08-06 ENCOUNTER — TELEPHONE (OUTPATIENT)
Dept: UROLOGY | Facility: AMBULATORY SURGERY CENTER | Age: 66
End: 2021-08-06

## 2021-08-06 ENCOUNTER — OFFICE VISIT (OUTPATIENT)
Dept: GYNECOLOGIC ONCOLOGY | Facility: CLINIC | Age: 66
End: 2021-08-06
Payer: COMMERCIAL

## 2021-08-06 VITALS
SYSTOLIC BLOOD PRESSURE: 130 MMHG | HEIGHT: 59 IN | DIASTOLIC BLOOD PRESSURE: 80 MMHG | RESPIRATION RATE: 16 BRPM | TEMPERATURE: 97.2 F | WEIGHT: 162 LBS | HEART RATE: 67 BPM | BODY MASS INDEX: 32.66 KG/M2

## 2021-08-06 VITALS — TEMPERATURE: 97.2 F

## 2021-08-06 DIAGNOSIS — R11.0 CHEMOTHERAPY-INDUCED NAUSEA: ICD-10-CM

## 2021-08-06 DIAGNOSIS — T45.1X5A CHEMOTHERAPY-INDUCED NAUSEA: ICD-10-CM

## 2021-08-06 DIAGNOSIS — C54.1 ENDOMETRIAL CANCER (HCC): ICD-10-CM

## 2021-08-06 DIAGNOSIS — C54.1 ENDOMETRIAL CANCER (HCC): Primary | Chronic | ICD-10-CM

## 2021-08-06 DIAGNOSIS — N39.0 URINARY TRACT INFECTION WITHOUT HEMATURIA, SITE UNSPECIFIED: Primary | ICD-10-CM

## 2021-08-06 DIAGNOSIS — I82.4Y1 ACUTE DEEP VEIN THROMBOSIS (DVT) OF PROXIMAL VEIN OF RIGHT LOWER EXTREMITY (HCC): ICD-10-CM

## 2021-08-06 DIAGNOSIS — N39.0 CHRONIC UTI: ICD-10-CM

## 2021-08-06 LAB
ALBUMIN SERPL BCP-MCNC: 3.4 G/DL (ref 3.5–5)
ALP SERPL-CCNC: 131 U/L (ref 46–116)
ALT SERPL W P-5'-P-CCNC: 19 U/L (ref 12–78)
AMYLASE SERPL-CCNC: 45 IU/L (ref 25–115)
ANION GAP SERPL CALCULATED.3IONS-SCNC: 8 MMOL/L (ref 4–13)
APTT PPP: 37 SECONDS (ref 23–37)
AST SERPL W P-5'-P-CCNC: 10 U/L (ref 5–45)
BACTERIA UR QL AUTO: ABNORMAL /HPF
BASOPHILS # BLD AUTO: 0.06 THOUSANDS/ΜL (ref 0–0.1)
BASOPHILS NFR BLD AUTO: 1 % (ref 0–1)
BILIRUB SERPL-MCNC: 0.26 MG/DL (ref 0.2–1)
BILIRUB UR QL STRIP: NEGATIVE
BUN SERPL-MCNC: 16 MG/DL (ref 5–25)
CALCIUM ALBUM COR SERPL-MCNC: 10.1 MG/DL (ref 8.3–10.1)
CALCIUM SERPL-MCNC: 9.6 MG/DL (ref 8.3–10.1)
CHLORIDE SERPL-SCNC: 107 MMOL/L (ref 100–108)
CHOLEST SERPL-MCNC: 186 MG/DL (ref 50–200)
CLARITY UR: ABNORMAL
CO2 SERPL-SCNC: 20 MMOL/L (ref 21–32)
COLOR UR: YELLOW
CREAT SERPL-MCNC: 0.81 MG/DL (ref 0.6–1.3)
CREAT UR-MCNC: 41.8 MG/DL
EOSINOPHIL # BLD AUTO: 0.04 THOUSAND/ΜL (ref 0–0.61)
EOSINOPHIL NFR BLD AUTO: 1 % (ref 0–6)
ERYTHROCYTE [DISTWIDTH] IN BLOOD BY AUTOMATED COUNT: 14.6 % (ref 11.6–15.1)
GFR SERPL CREATININE-BSD FRML MDRD: 78 ML/MIN/1.73SQ M
GGT SERPL-CCNC: 14 U/L (ref 5–85)
GLUCOSE SERPL-MCNC: 102 MG/DL (ref 65–140)
GLUCOSE UR STRIP-MCNC: NEGATIVE MG/DL
HCT VFR BLD AUTO: 31.2 % (ref 34.8–46.1)
HGB BLD-MCNC: 10.2 G/DL (ref 11.5–15.4)
HGB UR QL STRIP.AUTO: ABNORMAL
IMM GRANULOCYTES # BLD AUTO: 0.08 THOUSAND/UL (ref 0–0.2)
IMM GRANULOCYTES NFR BLD AUTO: 1 % (ref 0–2)
INR PPP: 0.9 (ref 0.84–1.19)
KETONES UR STRIP-MCNC: NEGATIVE MG/DL
LEUKOCYTE ESTERASE UR QL STRIP: ABNORMAL
LIPASE SERPL-CCNC: 227 U/L (ref 73–393)
LYMPHOCYTES # BLD AUTO: 0.91 THOUSANDS/ΜL (ref 0.6–4.47)
LYMPHOCYTES NFR BLD AUTO: 12 % (ref 14–44)
MAGNESIUM SERPL-MCNC: 1.8 MG/DL (ref 1.6–2.6)
MCH RBC QN AUTO: 32.5 PG (ref 26.8–34.3)
MCHC RBC AUTO-ENTMCNC: 32.7 G/DL (ref 31.4–37.4)
MCV RBC AUTO: 99 FL (ref 82–98)
MONOCYTES # BLD AUTO: 0.78 THOUSAND/ΜL (ref 0.17–1.22)
MONOCYTES NFR BLD AUTO: 10 % (ref 4–12)
NEUTROPHILS # BLD AUTO: 5.69 THOUSANDS/ΜL (ref 1.85–7.62)
NEUTS SEG NFR BLD AUTO: 75 % (ref 43–75)
NITRITE UR QL STRIP: NEGATIVE
NON-SQ EPI CELLS URNS QL MICRO: ABNORMAL /HPF
NRBC BLD AUTO-RTO: 0 /100 WBCS
PH UR STRIP.AUTO: 6 [PH]
PHOSPHATE SERPL-MCNC: 3.9 MG/DL (ref 2.3–4.1)
PLATELET # BLD AUTO: 282 THOUSANDS/UL (ref 149–390)
PMV BLD AUTO: 9.9 FL (ref 8.9–12.7)
POTASSIUM SERPL-SCNC: 3.9 MMOL/L (ref 3.5–5.3)
PROT SERPL-MCNC: 8.2 G/DL (ref 6.4–8.2)
PROT UR STRIP-MCNC: ABNORMAL MG/DL
PROT UR-MCNC: 70 MG/DL
PROT/CREAT UR: 1.67 MG/G{CREAT} (ref 0–0.1)
PROTHROMBIN TIME: 12.2 SECONDS (ref 11.6–14.5)
RBC # BLD AUTO: 3.14 MILLION/UL (ref 3.81–5.12)
RBC #/AREA URNS AUTO: ABNORMAL /HPF
SODIUM SERPL-SCNC: 135 MMOL/L (ref 136–145)
SP GR UR STRIP.AUTO: 1.01 (ref 1–1.03)
T3 SERPL-MCNC: 1.4 NG/ML (ref 0.6–1.8)
T4 FREE SERPL-MCNC: 1.03 NG/DL (ref 0.76–1.46)
TSH SERPL DL<=0.05 MIU/L-ACNC: 1.02 UIU/ML (ref 0.36–3.74)
UROBILINOGEN UR QL STRIP.AUTO: 0.2 E.U./DL
WBC # BLD AUTO: 7.56 THOUSAND/UL (ref 4.31–10.16)
WBC #/AREA URNS AUTO: ABNORMAL /HPF

## 2021-08-06 PROCEDURE — 81001 URINALYSIS AUTO W/SCOPE: CPT

## 2021-08-06 PROCEDURE — 1036F TOBACCO NON-USER: CPT | Performed by: NURSE PRACTITIONER

## 2021-08-06 PROCEDURE — 99214 OFFICE O/P EST MOD 30 MIN: CPT | Performed by: NURSE PRACTITIONER

## 2021-08-06 PROCEDURE — 84443 ASSAY THYROID STIM HORMONE: CPT | Performed by: OBSTETRICS & GYNECOLOGY

## 2021-08-06 PROCEDURE — 82150 ASSAY OF AMYLASE: CPT | Performed by: OBSTETRICS & GYNECOLOGY

## 2021-08-06 PROCEDURE — 82465 ASSAY BLD/SERUM CHOLESTEROL: CPT | Performed by: OBSTETRICS & GYNECOLOGY

## 2021-08-06 PROCEDURE — 84100 ASSAY OF PHOSPHORUS: CPT | Performed by: OBSTETRICS & GYNECOLOGY

## 2021-08-06 PROCEDURE — 82977 ASSAY OF GGT: CPT | Performed by: OBSTETRICS & GYNECOLOGY

## 2021-08-06 PROCEDURE — 85025 COMPLETE CBC W/AUTO DIFF WBC: CPT | Performed by: OBSTETRICS & GYNECOLOGY

## 2021-08-06 PROCEDURE — 85730 THROMBOPLASTIN TIME PARTIAL: CPT | Performed by: OBSTETRICS & GYNECOLOGY

## 2021-08-06 PROCEDURE — 85610 PROTHROMBIN TIME: CPT | Performed by: OBSTETRICS & GYNECOLOGY

## 2021-08-06 PROCEDURE — 82570 ASSAY OF URINE CREATININE: CPT

## 2021-08-06 PROCEDURE — 3008F BODY MASS INDEX DOCD: CPT | Performed by: NURSE PRACTITIONER

## 2021-08-06 PROCEDURE — 80053 COMPREHEN METABOLIC PANEL: CPT | Performed by: OBSTETRICS & GYNECOLOGY

## 2021-08-06 PROCEDURE — 83735 ASSAY OF MAGNESIUM: CPT | Performed by: OBSTETRICS & GYNECOLOGY

## 2021-08-06 PROCEDURE — 84439 ASSAY OF FREE THYROXINE: CPT | Performed by: OBSTETRICS & GYNECOLOGY

## 2021-08-06 PROCEDURE — 84480 ASSAY TRIIODOTHYRONINE (T3): CPT | Performed by: OBSTETRICS & GYNECOLOGY

## 2021-08-06 PROCEDURE — 83690 ASSAY OF LIPASE: CPT | Performed by: OBSTETRICS & GYNECOLOGY

## 2021-08-06 PROCEDURE — 84156 ASSAY OF PROTEIN URINE: CPT

## 2021-08-06 RX ORDER — LORAZEPAM 0.5 MG/1
0.5 TABLET ORAL EVERY 6 HOURS PRN
Qty: 36 TABLET | Refills: 1 | Status: SHIPPED | OUTPATIENT
Start: 2021-08-06

## 2021-08-06 RX ORDER — ONDANSETRON HYDROCHLORIDE 8 MG/1
8 TABLET, FILM COATED ORAL EVERY 8 HOURS PRN
Qty: 30 TABLET | Refills: 1 | Status: SHIPPED | OUTPATIENT
Start: 2021-08-06 | End: 2021-12-10 | Stop reason: SDUPTHER

## 2021-08-06 RX ORDER — SODIUM CHLORIDE 9 MG/ML
20 INJECTION, SOLUTION INTRAVENOUS CONTINUOUS
Status: DISCONTINUED | OUTPATIENT
Start: 2021-08-09 | End: 2021-08-10 | Stop reason: HOSPADM

## 2021-08-06 RX ORDER — SULFAMETHOXAZOLE AND TRIMETHOPRIM 800; 160 MG/1; MG/1
1 TABLET ORAL EVERY 12 HOURS SCHEDULED
Qty: 10 TABLET | Refills: 0 | Status: SHIPPED | OUTPATIENT
Start: 2021-08-06 | End: 2021-08-11

## 2021-08-06 NOTE — TELEPHONE ENCOUNTER
Patient's urine culture is positive for infection  Bactrim sent to pharmacy on file  Please notify patient and instruct her to complete entire course of antibiotics and hydrate with water   Thank you

## 2021-08-06 NOTE — ASSESSMENT & PLAN NOTE
62-year-old with recurrent stage IB endometrial cancer who is s/p cycle 12 of treatment on the [facility] trial  She is feeling well  She has recurrent UTIs despite antibiotic therapy  Her constipation and pain have improved and she is otherwise feeling well  Her performance status is 1  Labs are pending  Will proceed with treatment on Monday pending results  She will return to the office as per clinical trial scheduling

## 2021-08-06 NOTE — PROGRESS NOTES
Labs have been reviewed and signed by Ike Arnold  Per protocol, patient is ok to proceed with Cycle 12 Day 1 treatment on Monday 8/9/21

## 2021-08-06 NOTE — ASSESSMENT & PLAN NOTE
Patient with persistent UTI  She has been treated with multiple antibiotics over the past several months, most recently Macrobid prescribed on 7/19  She saw urology this week who recommended referral to infectious disease

## 2021-08-06 NOTE — TELEPHONE ENCOUNTER
Patient would like a call back from Tod Larose to discuss about her medication enoxaparin (LOVENOX), please call back at [phone]

## 2021-08-06 NOTE — PROGRESS NOTES
Patient was seen in the office for her Cycle 12 Day 1 pre treatment office visit with Tenisha Reyes  Patient AEs and Conmeds were reviewed  Patient denied changes to her conmeds, but reported that she is experiencing consistant UTI'S only having relief with antibiotics for a week then it returns  Patient is being followed by Urology now for this and they have consulted infectious disease  Patient expressed improvement in her constipation with more frequent bowel movements  Patient also expressed more of an appetite and is trying to use high protein foods  Future appointments were reviewed  Overall, patient states that she is feeling okay, and was told to call with any questions or concerns

## 2021-08-06 NOTE — PROGRESS NOTES
Assessment/Plan:    Problem List Items Addressed This Visit        Cardiovascular and Mediastinum    Acute deep vein thrombosis (DVT) of proximal vein of lower extremity (HCC)     Patient will need lifetime anticoagulation given active malignancy  Genitourinary    [facility] ) - Primary (Chronic)     63year-old with recurrent stage IB endometrial cancer who is s/p cycle 12 of treatment on the [facility] trial  She is feeling well  She has recurrent UTIs despite antibiotic therapy  Her constipation and pain have improved and she is otherwise feeling well  Her performance status is 1  Labs are pending  Will proceed with treatment on Monday pending results  She will return to the office as per clinical trial scheduling  Chronic UTI     Patient with persistent UTI  She has been treated with multiple antibiotics over the past several months, most recently Macrobid prescribed on 7/19  She saw urology this week who recommended referral to infectious disease  Other    Chemotherapy-induced nausea     Zofran refilled  Relevant Medications    LORazepam (ATIVAN) 0 5 mg tablet    ondansetron (ZOFRAN) 8 mg tablet            CHIEF COMPLAINT:       Problem:  Cancer Staging  Endometrial cancer ([place] [address] )  Staging form: Corpus Uteri - Carcinoma, AJCC 7th Edition  - Clinical stage from 12/19/2017: FIGO Stage IB (T1b, N0, M0) - Signed by Kathryn Ramirez MD on 2/12/2018        Previous therapy:  Oncology History   Endometrial cancer ([facility] )   11/17/2017 Initial Diagnosis    Endometrial cancer ([place] [address] )     11/17/2017 Biopsy    ENDOMETRIAL BIOPSY: WELL DIFFERENTIATED ENDOMETRIAL ADENOCARCINOMA (FIGO I) WITH FOCALMUCINOUS FEATURES  Part B: ENDOCERVICAL POLYP:BENIGN ENDOCERVICAL      12/19/2017 Surgery    Robotic assisted total laparoscopic hysterectomy with bilateral salpingo-oophorectomy and sentinel bilateral pelvic lymph node dissection   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 (4 4 x 3 2 cm tumor, 9 4/15 4mm invasion, NO LVSI, washings revealed atypical cellular changes)     12/19/2017 Genetic Testing    Morrison testing negative     6/28/2019 Biopsy    A  Breast, Right, US BX Right Breast 1000 4 cmfn:  - Benign breast tissue with focal histiocytic aggregate  See comment   - Negative for atypia and in-situ or invasive carcinoma  7/8/2019 Recurrence    Presented with right lower extremity DVT and CT demonstrating right pelvic sidewall mass with venous, ureteral and nerve compression causing significant neuropathic pain  Biopsy:  Lymph Node, Right pelvic lymph node x3:  - High-grade adenocarcinoma  7/30/2019 - 1/6/2020 Chemotherapy    Taxol 175 mg/m2 and carboplatin AUC 6 every 21 days  Dose was reduced to taxol 135 mg/m2 and carboplatin AUC 5  Completed 6 cycles  Treatment protracted due to multiple hospital admissions  2/24/2020 - 4/13/2020 Radiation    adjuvant external beam radiation therapy to the whole pelvis to 4500 cGy followed by boost to gross disease of an additional 2200 cGy     11/23/2020 Progression    New necrotic adenopathy in the retroperitoneum on CT      12/21/2020 -  Chemotherapy    Began chemotherapy with rucaparib, atezolizumab, and bevacizumab as per [facility] clinical trial            Patient ID: Corinne Hannah is a 63 y o  female     Jen Offer has been tolerating chemotherapy well  She has been afebrile  She denies nausea or vomiting  Her appetite is appropriate  She is voiding and moving her bowels without difficulty  Oxybutynin and Myrbetriq has managed her urinary frequency  Due for nephrostomy tube exchange on 9/3/21  She denies abdominal or pelvic pain  The patient is without vaginal bleeding or discharge  She denies chemotherapy-induced neuropathy  She is ambulatory          The following portions of the patient's history were reviewed and updated as appropriate: allergies, current medications, past family history, past medical history, past social history, past surgical history and problem list     Review of Systems   Constitutional: Negative for activity change, appetite change, chills, fatigue, fever and unexpected weight change  HENT: Negative for mouth sores  Eyes: Negative  Respiratory: Negative for cough, chest tightness, shortness of breath and wheezing  Cardiovascular: Negative for chest pain, palpitations and leg swelling  Gastrointestinal: Negative for abdominal distention, abdominal pain, anal bleeding, blood in stool, constipation, diarrhea, nausea and vomiting  Endocrine: Negative  Genitourinary: Negative for difficulty urinating, dysuria, flank pain, frequency, hematuria, pelvic pain, urgency, vaginal bleeding, vaginal discharge and vaginal pain  Musculoskeletal: Negative for arthralgias and myalgias  Skin: Negative for color change, pallor and rash  Neurological: Negative for dizziness, weakness, numbness and headaches  Hematological: Negative  Psychiatric/Behavioral: The patient is not nervous/anxious          Current Outpatient Medications   Medication Sig Dispense Refill    ARIPiprazole (ABILIFY) 10 mg tablet Take 10 mg by mouth daily      aspirin (ECOTRIN LOW STRENGTH) 81 mg EC tablet Take 81 mg by mouth daily      Calcium-Magnesium-Vitamin D (CALCIUM 500 PO) Take by mouth daily       cholecalciferol (VITAMIN D3) 1,000 units tablet Take 1,000 Units by mouth daily      Cyanocobalamin (VITAMIN B12 PO) Take by mouth daily       enoxaparin (LOVENOX) 100 mg/mL INJECT 1 ML (100 MG TOTAL) UNDER THE SKIN EVERY 24 HOURS 90 mL 2    folic acid (FOLVITE) 1 mg tablet Take 1 tablet (1 mg total) by mouth daily  0    gemfibrozil (LOPID) 600 mg tablet TAKE 1 TABLET BY MOUTH EVERY DAY 90 tablet 1    lactulose (CHRONULAC) 10 g/15 mL solution TAKE 45 ML (30 G TOTAL) BY MOUTH 2 (TWO) TIMES A  mL 2    LORazepam (ATIVAN) 0 5 mg tablet Take 1 tablet (0 5 mg total) by mouth every 6 (six) hours as needed for anxiety (or nausea) 36 tablet 1    Multiple Vitamin (MULTIVITAMIN) tablet Take 1 tablet by mouth daily      Myrbetriq 50 MG TB24 TAKE 1 TABLET BY MOUTH EVERY DAY 30 tablet 5    omeprazole (PriLOSEC) 20 mg delayed release capsule Take 20 mg by mouth daily      ondansetron (ZOFRAN) 8 mg tablet Take 1 tablet (8 mg total) by mouth every 8 (eight) hours as needed for nausea or vomiting 30 tablet 1    oxybutynin (DITROPAN XL) 15 MG 24 hr tablet Take 1 tablet (15 mg total) by mouth daily at bedtime 90 tablet 3    phenazopyridine (PYRIDIUM) 200 mg tablet Take 1 tablet (200 mg total) by mouth daily as needed for bladder spasms 30 tablet 0    quinapril (ACCUPRIL) 5 mg tablet TAKE 1 TABLET BY MOUTH EVERYDAY AT BEDTIME 90 tablet 1    rucaparib (RUBRACA) 300 mg tablet Take 600 mg by mouth every 12 (twelve) hours Take with or without food  Do not repeat a vomited dose   saccharomyces boulardii (FLORASTOR) 250 mg capsule Take 1 capsule (250 mg total) by mouth 2 (two) times a day  0    venlafaxine (EFFEXOR) 75 mg tablet Take 75 mg by mouth 3 (three) times a day        acetaminophen (TYLENOL) 325 mg tablet Take 2 tablets (650 mg total) by mouth every 6 (six) hours as needed for mild pain, headaches or fever (Patient not taking: Reported on 8/6/2021) 30 tablet 0    acetaminophen-codeine (TYLENOL #3) 300-30 mg per tablet Take 1 tablet by mouth every 6 (six) hours as needed for moderate pain (Patient not taking: Reported on 8/6/2021) 20 tablet 0    Sodium Chloride Flush (Normal Saline Flush) 0 9 % SOLN  (Patient not taking: Reported on 8/6/2021)      sodium chloride, PF, 0 9 % 10 mL by Intracatheter route daily for 30 doses 10cc syringes 300 mL 3     No current facility-administered medications for this visit  Objective:    Blood pressure 130/80, pulse 67, temperature (!) 97 2 °F (36 2 °C), temperature source Temporal, resp   rate 16, height 4' 11" (1 499 m), weight 73 5 kg (162 lb), not currently breastfeeding  Body mass index is 32 72 kg/m²  Body surface area is 1 69 meters squared  Physical Exam  Vitals reviewed  Constitutional:       General: She is not in acute distress  Appearance: Normal appearance  She is not ill-appearing  HENT:      Head: Normocephalic and atraumatic  Mouth/Throat:      Mouth: Mucous membranes are moist    Eyes:      General: No scleral icterus  Right eye: No discharge  Left eye: No discharge  Conjunctiva/sclera: Conjunctivae normal    Pulmonary:      Effort: Pulmonary effort is normal    Musculoskeletal:      Right lower leg: No edema  Left lower leg: No edema  Skin:     General: Skin is warm and dry  Coloration: Skin is not jaundiced  Findings: No rash  Neurological:      General: No focal deficit present  Mental Status: She is alert and oriented to person, place, and time  Cranial Nerves: No cranial nerve deficit  Sensory: No sensory deficit  Motor: No weakness  Gait: Gait normal    Psychiatric:         Mood and Affect: Mood normal          Behavior: Behavior normal          Thought Content:  Thought content normal          Judgment: Judgment normal          No results found for:   Lab Results   Component Value Date     10/27/2017    K 3 9 08/06/2021     08/06/2021    CO2 20 (L) 08/06/2021    BUN 16 08/06/2021    CREATININE 0 81 08/06/2021    GLUCOSE 219 (H) 12/19/2017    GLUF 105 (H) 04/02/2021    CALCIUM 9 6 08/06/2021    CORRECTEDCA 10 1 08/06/2021    AST 10 08/06/2021    ALT 19 08/06/2021    ALKPHOS 131 (H) 08/06/2021    PROT 6 9 10/27/2017    BILITOT 0 3 10/27/2017    EGFR 78 08/06/2021     Lab Results   Component Value Date    WBC 7 56 08/06/2021    HGB 10 2 (L) 08/06/2021    HCT 31 2 (L) 08/06/2021    MCV 99 (H) 08/06/2021     08/06/2021     Lab Results   Component Value Date    NEUTROABS 5 69 08/06/2021        Trend:  No results found for:

## 2021-08-07 DIAGNOSIS — I10 BENIGN ESSENTIAL HYPERTENSION: ICD-10-CM

## 2021-08-07 LAB
BACTERIA UR CULT: ABNORMAL
BACTERIA UR CULT: ABNORMAL

## 2021-08-09 ENCOUNTER — NUTRITION (OUTPATIENT)
Dept: NUTRITION | Facility: CLINIC | Age: 66
End: 2021-08-09

## 2021-08-09 ENCOUNTER — DOCUMENTATION (OUTPATIENT)
Dept: OTHER | Facility: HOSPITAL | Age: 66
End: 2021-08-09

## 2021-08-09 ENCOUNTER — HOSPITAL ENCOUNTER (OUTPATIENT)
Dept: INFUSION CENTER | Facility: CLINIC | Age: 66
Discharge: HOME | End: 2021-08-09
Payer: COMMERCIAL

## 2021-08-09 ENCOUNTER — TELEPHONE (OUTPATIENT)
Dept: UROLOGY | Facility: HOSPITAL | Age: 66
End: 2021-08-09

## 2021-08-09 VITALS
HEART RATE: 75 BPM | OXYGEN SATURATION: 97 % | BODY MASS INDEX: 31.45 KG/M2 | HEIGHT: 59 IN | DIASTOLIC BLOOD PRESSURE: 58 MMHG | RESPIRATION RATE: 20 BRPM | SYSTOLIC BLOOD PRESSURE: 121 MMHG | WEIGHT: 156 LBS | TEMPERATURE: 97.9 F

## 2021-08-09 DIAGNOSIS — N39.0 RECURRENT UTI: Primary | ICD-10-CM

## 2021-08-09 DIAGNOSIS — Z71.3 NUTRITIONAL COUNSELING: Primary | ICD-10-CM

## 2021-08-09 PROCEDURE — J9999Q0 INV ATEZOLIZUMAB 1200MG/20ML 20 ML: Performed by: OBSTETRICS & GYNECOLOGY

## 2021-08-09 PROCEDURE — J9035Q0 INV BEVACIZUMAB 400MG/16ML 16 ML: Performed by: OBSTETRICS & GYNECOLOGY

## 2021-08-09 PROCEDURE — 96417 CHEMO IV INFUS EACH ADDL SEQ: CPT

## 2021-08-09 PROCEDURE — 96413 CHEMO IV INFUSION 1 HR: CPT

## 2021-08-09 RX ORDER — QUINAPRIL 5 1/1
TABLET ORAL
Qty: 90 TABLET | Refills: 1 | Status: SHIPPED | OUTPATIENT
Start: 2021-08-09 | End: 2022-02-04

## 2021-08-09 RX ORDER — NITROFURANTOIN 25; 75 MG/1; MG/1
100 CAPSULE ORAL 2 TIMES DAILY
Qty: 10 CAPSULE | Refills: 0 | Status: SHIPPED | OUTPATIENT
Start: 2021-08-09 | End: 2021-08-14

## 2021-08-09 RX ADMIN — Medication 1089 MG: at 09:34

## 2021-08-09 RX ADMIN — Medication 1200 MG: at 08:50

## 2021-08-09 RX ADMIN — SODIUM CHLORIDE 20 ML/HR: 0.9 INJECTION, SOLUTION INTRAVENOUS at 08:14

## 2021-08-09 NOTE — TELEPHONE ENCOUNTER
Please notify patient her antibiotic has been changed to Macrobid based on final sensitivity report    Thank you

## 2021-08-09 NOTE — PROGRESS NOTES
Patient was seen in Replaced by [facility] for [id number] [facility] Day 1 treatment  Patient was given Cycle 12 Rucaparib pills and drug diary, and patient returned Cycle 11 study pills, and diary  Patient denied any changes to AE'S and conmeds reviewed   Patient did state that she is on an antibiotic macrobid for her uti  She states that she is feeling good at this time   Patient was told to call with any questions or concerns, Patient tolerated treatment without incident today

## 2021-08-09 NOTE — PATIENT INSTRUCTIONS
Nutrition Rx & Recommendations:  · Diet: High Calorie, High Protein (for high calorie foods see pages 52-53, and for high protein foods see pages 49-51 in your Eating Hints book)  · Small, frequent meals/snacks may be easier to tolerate than 3 large daily meals  Aim for 5-6 small meals per day (every 2-3 hours)  · Include protein at all meals/snacks  · Avoid spicy, acidic, sharp/hard/crunchy foods & carbonated beverages as needed  · Follow proper oral care; Try baking soda/salt water rinse recipe (mix 3/4 tsp salt + 1 tsp baking soda + 1 qt water; rinse with plain water after using) in Eating Hints book (pg 18)  Brush your teeth before/after meals & before bed  · For weight loss: monitor your weight at home at least 2x/week, record your weight, start by adding 250-500 extra calories per day, eat 5-6 small scheduled meals every 2-3 hrs, choose foods that are high in protein and calories (see pages 49-53 in your Eating Hints book), drink liquids with calories for example: milkshakes/smoothies/juice/soup/whole milk/chocolate milk, cook with protein fortified milk (see recipe on page 36 in your Eating Hints book), consider ready-to-drink oral nutrition supplements such as Ensure Plus, Ensure Enlive, Boost Plus, or Boost Very High Calorie, avoid "diet" and "light" foods when possible, avoid drinking too much with meals, contact your dietitian with any continued weight loss over the course of 1 week  For more info see pages 35-37 in your Eating Hints book  · Weigh yourself regularly  If you notice weight loss, make an effort to increase your daily food/calorie intake  If you continue to notice loss after these efforts, reach out to your dietitian to establish a plan to stabilize weight  · Nutrition Supplements:  Try protein powder mixed with 2% Fairlife milk  Try to drink 1-2 per day    · Snack ideas: [place] yogurt with fresh fruit, veggies with humus, pretzels with peanut butter, grapes and cheese, watermelon with a cheese stick , etc   · Lifetime vitamin/mineral supplementation recommended for Gastric Bypass Surgery:  · Multivitamin BID  · Iron 45 mg daily  · Vitamin D daily  · Calcium Citrate 500 mg TID (do not take with iron, needs to be 2 hrs apart from iron)  · B12 1000 mcg daily    Resources for recipes: [employer], cancerdietitian  com, [employer]/support/nutrition-and-cancer, Skinnytaste  com, [employer] oncologynutrition  org/erfc/recipes, www cookforyourlife  org, The Essential Cancer Treatment Nutrition Guide and Cookbook: Includes 343 Healthy and Delicious Recipes      Follow Up Plan: 8/30/21 during infusion   Recommend Referral to Other Providers: none at this time

## 2021-08-12 ENCOUNTER — TELEPHONE (OUTPATIENT)
Dept: UROLOGY | Facility: AMBULATORY SURGERY CENTER | Age: 66
End: 2021-08-12

## 2021-08-12 NOTE — TELEPHONE ENCOUNTER
Patient last seen Roderick Ga [facility]) patient called in stating since taking Macrobid (8/9) she still has burning, not symptoms have changed   Patient can be reached at [phone]

## 2021-08-12 NOTE — TELEPHONE ENCOUNTER
Called pt to advise above  Pt stated she still has pyridium from last time but will try the AZO  Advised to take prescriptions as prescribed  Pt is going on vacation Saturday and is worried about going away with this discomfort  I advised trying to AZO and to call back if symptoms persist  Pt verbally agreed and understood plan

## 2021-08-12 NOTE — TELEPHONE ENCOUNTER
Recent culture and sensitivity identified susceptibility to Macrobid  It can take time for lower urinary tract symptoms completely resolved  Would recommend patient take OTC AZO  If symptoms persist, would recheck urine culture to ensure infection has resolved

## 2021-08-12 NOTE — TELEPHONE ENCOUNTER
Pt jeancarlos fevers, chills, or blood in urine  States her burning is getting worse and is tired  Pt stated she is keeping hydrated

## 2021-08-20 NOTE — PROGRESS NOTES
Outpatient Oncology Nutrition Consult  Type of Consult: Follow Up  Care Location: [facility]   Nutrition Assessment:  Oncology Diagnosis & Treatments:   Endometrial cancer  -S/p surgery 12/19/17    -Recurrence 7/8/19    -S/p chemotherapy (carboplatin/taxol from 7/30/19-1/6/20)  -S/p whole pelvis RT (2/4/20- 4/13/20)  -Disease progression    -ENDOBAR trial started 12/21/20  Oncology History   Endometrial cancer ([facility] [address] )   11/17/2017 Initial Diagnosis    Endometrial cancer ([facility] [address] )     11/17/2017 Biopsy    ENDOMETRIAL BIOPSY: WELL DIFFERENTIATED ENDOMETRIAL ADENOCARCINOMA (FIGO I) WITH FOCALMUCINOUS FEATURES  Part B: ENDOCERVICAL POLYP:BENIGN ENDOCERVICAL      12/19/2017 Surgery    Robotic assisted total laparoscopic hysterectomy with bilateral salpingo-oophorectomy and sentinel bilateral pelvic lymph node dissection  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 (4 4 x 3 2 cm tumor, 9 4/15 4mm invasion, NO LVSI, washings revealed atypical cellular changes)     12/19/2017 Genetic Testing    Morrison testing negative     6/28/2019 Biopsy    A  Breast, Right, US BX Right Breast 1000 4 cmfn:  - Benign breast tissue with focal histiocytic aggregate  See comment   - Negative for atypia and in-situ or invasive carcinoma  7/8/2019 Recurrence    Presented with right lower extremity DVT and CT demonstrating right pelvic sidewall mass with venous, ureteral and nerve compression causing significant neuropathic pain  Biopsy:  Lymph Node, Right pelvic lymph node x3:  - High-grade adenocarcinoma  7/30/2019 - 1/6/2020 Chemotherapy    Taxol 175 mg/m2 and carboplatin AUC 6 every 21 days  Dose was reduced to taxol 135 mg/m2 and carboplatin AUC 5  Completed 6 cycles  Treatment protracted due to multiple hospital admissions       2/24/2020 - 4/13/2020 Radiation    adjuvant external beam radiation therapy to the whole pelvis to 4500 cGy followed by boost to gross disease of an additional 2200 cGy 11/23/2020 Progression    New necrotic adenopathy in the retroperitoneum on CT      12/21/2020 -  Chemotherapy    Began chemotherapy with rucaparib, atezolizumab, and bevacizumab as per [facility] clinical trial        PMH:  RNYGB (2001 at [address])    Past Medical History:   Diagnosis Date    Anemia     Chemotherapy follow-up examination     Depression     Endometrial cancer ([address] ) 12/2017    Hyperglycemia     vx type 2 dm -- last assessed 4/1/14; resolved 11/7/17    Hyperlipidemia     Hypertension     Obesity     last assessed 10/14/17; resolved 11/7/17     Past Surgical History:   Procedure Laterality Date    ABDOMINAL SURGERY      GASTRIC BYPASS    CHOLECYSTECTOMY      at the time of gastric bypass    COLONOSCOPY      CT NEEDLE BIOPSY LYMPH NODE  7/8/2019    FL GUIDED CENTRAL VENOUS ACCESS DEVICE INSERTION  11/12/2019    GASTRIC BYPASS      HYSTERECTOMY Bilateral     total abdominal with salpingo-oophorectomy    IR NEPHROSTOMY TUBE PLACEMENT  7/26/2019    IR NEPHROURETERAL STENT CHECK/CHANGE/REPOSITION  4/6/2021    IR NEPHROURETERAL STENT CHECK/CHANGE/REPOSITION  6/4/2021    IR PICC LINE  9/27/2019    IR PORT PLACEMENT  7/26/2019    IR PORT REMOVAL  9/20/2019    OOPHORECTOMY Bilateral     RI INSJ TUNNELED CTR VAD W/SUBQ PORT AGE 5 YR/> Left 11/12/2019    Procedure: INSERTION VENOUS PORT ( PORT-A-CATH) IR;  Surgeon: Ian Gabriel DO;  Location: [facility];  Service: Interventional Radiology    RI LAP, RADICAL HYST W/ TUBE&OV, NODE BX N/A 12/19/2017    Procedure: HYSTERECTOMY LAPAROSCOPIC TOTAL ([address]) W/ ROBOTICS; BILATERAL SALPINGOOPHERECTOMY; LYMPH NODE DISSECTION; lysis of adhesions;  Surgeon: Cordell Delaney MD;  Location: [facility];  Service: Gynecology Oncology    RI LAP,DIAGNOSTIC ABDOMEN N/A 12/19/2017    Procedure: LAPAROSCOPY DIAGNOSTIC;  Surgeon: Cordell Delaney MD;  Location: [facility];  Service: Gynecology Oncology    TONSILLECTOMY      US GUIDED BREAST BIOPSY RIGHT COMPLETE Right 6/28/2019       Review of Medications:   Vitamins, Supplements and Herbals: yes: Hx RNYGB regimen: Vitamin D, Folic Acid, Align, calcium citrate TID, B12 1000 mcg QD, MVI BID (MVI does not have iron);  Iron 65 mg QD (2 hrs seperate from calcium)    Current Outpatient Medications:     acetaminophen (TYLENOL) 325 mg tablet, Take 2 tablets (650 mg total) by mouth every 6 (six) hours as needed for mild pain, headaches or fever (Patient not taking: Reported on 8/6/2021), Disp: 30 tablet, Rfl: 0    acetaminophen-codeine (TYLENOL #3) 300-30 mg per tablet, Take 1 tablet by mouth every 6 (six) hours as needed for moderate pain (Patient not taking: Reported on 8/6/2021), Disp: 20 tablet, Rfl: 0    ARIPiprazole (ABILIFY) 10 mg tablet, Take 10 mg by mouth daily, Disp: , Rfl:     aspirin (ECOTRIN LOW STRENGTH) 81 mg EC tablet, Take 81 mg by mouth daily, Disp: , Rfl:     Calcium-Magnesium-Vitamin D (CALCIUM 500 PO), Take by mouth daily , Disp: , Rfl:     cholecalciferol (VITAMIN D3) 1,000 units tablet, Take 1,000 Units by mouth daily, Disp: , Rfl:     Cyanocobalamin (VITAMIN B12 PO), Take by mouth daily , Disp: , Rfl:     enoxaparin (LOVENOX) 100 mg/mL, Inject 1 mL (100 mg total) under the skin every 24 hours, Disp: 30 mL, Rfl: 2    folic acid (FOLVITE) 1 mg tablet, Take 1 tablet (1 mg total) by mouth daily, Disp: , Rfl: 0    gemfibrozil (LOPID) 600 mg tablet, TAKE 1 TABLET BY MOUTH EVERY DAY, Disp: 90 tablet, Rfl: 1    lactulose (CHRONULAC) 10 g/15 mL solution, TAKE 45 ML (30 G TOTAL) BY MOUTH 2 (TWO) TIMES A DAY, Disp: 300 mL, Rfl: 2    LORazepam (ATIVAN) 0 5 mg tablet, Take 1 tablet (0 5 mg total) by mouth every 6 (six) hours as needed for anxiety (or nausea), Disp: 36 tablet, Rfl: 1    Multiple Vitamin (MULTIVITAMIN) tablet, Take 1 tablet by mouth daily, Disp: , Rfl:     Myrbetriq 50 MG TB24, TAKE 1 TABLET BY MOUTH EVERY DAY, Disp: 30 tablet, Rfl: 5    omeprazole (PriLOSEC) 20 mg delayed release capsule, Take 20 mg by mouth daily, Disp: , Rfl:     ondansetron (ZOFRAN) 8 mg tablet, Take 1 tablet (8 mg total) by mouth every 8 (eight) hours as needed for nausea or vomiting, Disp: 30 tablet, Rfl: 1    oxybutynin (DITROPAN XL) 15 MG 24 hr tablet, Take 1 tablet (15 mg total) by mouth daily at bedtime, Disp: 90 tablet, Rfl: 3    phenazopyridine (PYRIDIUM) 200 mg tablet, Take 1 tablet (200 mg total) by mouth daily as needed for bladder spasms, Disp: 30 tablet, Rfl: 0    quinapril (ACCUPRIL) 5 mg tablet, TAKE 1 TABLET BY MOUTH EVERYDAY AT BEDTIME, Disp: 90 tablet, Rfl: 1    rucaparib (RUBRACA) 300 mg tablet, Take 600 mg by mouth every 12 (twelve) hours Take with or without food  Do not repeat a vomited dose , Disp: , Rfl:     saccharomyces boulardii (FLORASTOR) 250 mg capsule, Take 1 capsule (250 mg total) by mouth 2 (two) times a day, Disp: , Rfl: 0    Sodium Chloride Flush (Normal Saline Flush) 0 9 % SOLN, , Disp: , Rfl:     sodium chloride, PF, 0 9 %, 10 mL by Intracatheter route daily for 30 doses 10cc syringes, Disp: 300 mL, Rfl: 3    venlafaxine (EFFEXOR) 75 mg tablet, Take 75 mg by mouth 3 (three) times a day  , Disp: , Rfl:   No current facility-administered medications for this visit      Facility-Administered Medications Ordered in Other Visits:     INV atezolizumab (INVESTIGATIONAL) 1,200 mg in sodium chloride 0 9 % 250 mL infusion, 1,200 mg, Intravenous, Once, Luli Busby PA-C    INV bevacizumab (INVESTIGATIONAL) 1,089 mg in sodium chloride 0 9 % 56 44 mL IVPB, 1,089 mg, Intravenous, Once, Luli Busby PA-C    sodium chloride 0 9 % infusion, 20 mL/hr, Intravenous, Continuous, Merna Guaman PA-C    Most Recent Lab Results:  Lab Results   Component Value Date    WBC 7 47 08/27/2021    IRON 24 (L) 10/02/2019    TIBC 138 (L) 10/02/2019    FERRITIN 1,042 (H) 10/02/2019    CHOLESTEROL 192 08/27/2021    CHOL 183 10/27/2017    TRIG 88 11/01/2018    HDL 59 11/01/2018    LDLCALC 116 (H) 11/01/2018    ALT 25 08/27/2021    AST 20 08/27/2021    ALB 3 4 (L) 08/27/2021     10/27/2017     05/12/2017    SODIUM 135 (L) 08/27/2021    SODIUM 135 (L) 08/06/2021    K 4 1 08/27/2021    K 3 9 08/06/2021     08/27/2021    BUN 16 08/27/2021    BUN 16 08/06/2021    CREATININE 1 00 08/27/2021    CREATININE 0 81 08/06/2021    EGFR 60 08/27/2021    PHOS 3 7 08/27/2021    PHOS 3 9 08/06/2021    GLUCOSE 219 (H) 12/19/2017    POCGLU 97 08/18/2020    GLUF 105 (H) 04/02/2021    GLUF 108 (H) 03/12/2021    GLUC 103 08/27/2021    HGBA1C 4 8 07/07/2021    HGBA1C 5 1 02/03/2020    HGBA1C 6 2 08/28/2019    CALCIUM 9 3 08/27/2021    FOLATE 5 4 10/06/2019    MG 2 0 08/27/2021     Anthropometric Measurements:   Height: 59"  Ht Readings from Last 1 Encounters:   08/27/21 4' 11" (1 499 m)     Wt Readings from Last 20 Encounters:   08/30/21 69 9 kg (154 lb)   08/27/21 72 8 kg (160 lb 9 6 oz)   08/09/21 70 8 kg (156 lb)   08/06/21 73 5 kg (162 lb)   08/04/21 73 kg (161 lb)   07/19/21 73 2 kg (161 lb 6 oz)   07/16/21 72 6 kg (160 lb)   07/07/21 73 9 kg (163 lb)   07/06/21 75 8 kg (167 lb)   06/28/21 76 kg (167 lb 8 oz)   06/25/21 77 6 kg (171 lb)   06/08/21 77 1 kg (170 lb)   06/07/21 77 1 kg (170 lb)   06/04/21 78 9 kg (174 lb)   05/26/21 78 kg (172 lb)   05/17/21 80 7 kg (178 lb)   05/14/21 81 6 kg (180 lb)   04/26/21 81 9 kg (180 lb 8 oz)   04/23/21 82 6 kg (182 lb 1 6 oz)   04/05/21 84 1 kg (185 lb 8 oz)     Weight Hx:  · Usual Weight: 270# before RNYGB in 2001; lowest post-op wt: 200#; wt typically fluctuates between 215-230#  · Varian: (2/25/20) 185 6#, (3/3/20) 188 6#, (3/10/20) 187 2#, (3/19/20) 186 2#, (3/24/20) 187#, (3/31/20) 185 4#, (4/10/20) 184  4#   · Home Scale Wts: (2/19/20) 185#, (8/3/20) 194# - not checking home wt    Oncology Nutrition-Anthropometrics      Nutrition from 8/30/2021 in [facility] Nutrition from 8/9/2021 in [facility]   Patient age (years):  63 years  63 years   Patient (female) height (in):  61 in  61 in   Current Weight to be used for anthropometric calculations (lbs)  154 lbs  156 lbs   Current Weight to be used for anthropometric calculations (kg)  70 kg  70 9 kg   BMI:  31 1  31 5   IBW female:  95 lbs  95 lbs   IBW (kg) female:  43 2 kg  43 2 kg   IBW % (female)  162 1 %  164 2 %   Adjusted BW (female):  109 8 lbs  110 2 lbs   Adjusted BW kg (female):  49 9 kg  50 1 kg   % weight change after 1 week:  --  (!) -3 1 %   Weight change after 1 week (lbs)  --  -5 lbs   % weight change after 1 month:  -4 3 %  -4 3 %   Weight change after 1 month (lbs)  -7 lbs  -7 lbs   % weight change after 3 months:  (!) -10 5 %  (!) -13 3 %   Weight change after 3 months (lbs)  -18 lbs  -24 lbs         Nutrition-Focused Physical Findings: mild  muscle depletion ([place]) - hollowing/scooping/depression     Food/Nutrition-Related History & Client/Social History:    Current Nutrition Impact Symptoms:   [] Nausea  [x] Reduced Appetite  [] Acid Reflux   [] Vomiting  [x] Unintended Wt Loss - ongoing & significant  -pt has been discussing psychological component of wt loss with her therapist  -pt again questions the accuracy of today's wt, encouraged home wts [] Malabsorption    [] Diarrhea  [] Unintended Wt Gain  [] Dumping Syndrome   [x] Constipation -   +BM daily  -using Miralax daily and lactulose [] Thick Mucous/Secretions  [] Abdominal Pain    [] Dysgeusia (Altered Taste) [x] Xerostomia (Dry Mouth) - stable  -using Biotene toothpaste and mouthwash    Biotene has been effective  -Uses dry mouth lozenges prn    -tried using bs/sw rinses, says she does not need them right now [] Gas    [] Dysosmia (Altered Smell)  [] [facility]  [] Difficulty Chewing    [] Oral Mucositis (Sore Mouth)  [x] Fatigue - energy is fair-good, has been doing more around the house recently [] Other:    [] Odynophagia   [] Esophagitis  [] Other:    [] Dysphagia [] Early Satiety  [] No Problems Eating      Food Allergies & Intolerances: yes: had skin patch testing which showed allergy to strawberries, but says she is able to eat strawberries without any side effects    Current Diet: Regular Diet, No Restrictions  Current Nutrition Intake: Unchanged from last visit, maybe a little bit less   Appetite: Good - decreased the past couple of days  Nutrition Route: PO  Meal planning/preparation mainly done by: mother does cooking; sister does shopping  Activity level: Has been more active at home  Wants to start walking outside but limited by pain and medical condition  24 Hr Diet Recall: grazed a lot during vacation  Breakfast: 1 cheese stick; yesterday: strawberry Unjury mixed with lemonade  Snack: nothing yet today; yesterday: none  Lunch: [employer]: cup of tomato soup, 1/2 grilled cheese sandwich   Snack: watermelon   Dinner: sister made dinner: 3 oz steak, 1/2 cup chopped peppers with oil/vinegar, 1/2 corn on the cob with butter an salt, water  Snack: 3 pc salami     Beverages: water (16 9 oz x4), Butch' regular unsweetened tea (32 oz x1), Unjury mixed with lemonade (2-3x/week), forgot to try whole Fairlife milk   -Does not drink coffee or alcohol      -Does not separate fluids from solids, does not have discomfort while eating and drinking at the same time    Supplements:   [employer]tr[employer] Essentials (1ovd=928 kcal, 5 g pro) mixed with - none lately    Unjury (does not use whole packet as it is too sweet) mixed with lemonade - 2-3x/week, wants to start making a pitcher of these for an easy grab and go shake   Does not like Ensure or Boost; has tried homemade milkshakes but lacks the motivation to make them    Oncology Nutrition-Estimated Needs      Nutrition from 7/19/2021 in [address] [place] [address] Street [facility]   Weight type used  Adjusted weight  Adjusted weight   Weight in kilograms (kg) used for estimated needs  --  54 1 kg   Weight in kilograms (kg) used for estimated needs  50 7 kg  --   Energy needs formula:   35-40 kcal/kg  25-30 kcal/kg   Energy needs based on 25 kcal/kg:  --  1352 kcal   Energy needs based on 30 kcal/kg:  --  1623 kcal   Energy needs based on 35 kcal/k kcal  --   Energy needs based on 40 kcal/k kcal  --   Protein needs formula:  1 5-2 g/kg  1-1 2 g/kg   Protein needs based on 1 g/kg  --  54 g   Protein needs based on 1 2 g/kg:  --  65 g   Protein needs based on 1 5 g/kg  76 g  --   Protein needs based on 2 g/kg  101 g  --   Fluid needs formula:  30-35 mL/kg  25-30 mL/kg   Fluid needs based on 25 mL/kg  --  1350 mL   Fluid needs in ounces  --  46 oz   Fluid needs based on 30 mL/kg  --  1620 mL   Fluid needs in ounces  --  55 oz   Fluid needs based on 30 mL/kg  1527 mL  --   Fluid needs in ounces  52 oz  --   Fluid needs based on 35 mL/kg  1782 mL  --   Fluid needs in ounces  60 oz  --        Discussion & Intervention:    Jannette Coronel was evaluated today for an RD follow up regarding wt loss and pt request for dietitian involvement throughout tx  Jannette Coronel is currently undergoing tx for endometrial cancer  Jannette Coronel continues with ongoing wt loss and she is unsure why  She reports that she went on vacation recently and was doing a lot of grazing throughout the day, although she says that her sister said she wasn't eating much  She reports ongoing UTI's and will see a specialist soon - she feels these are taking a toll on her  Since last visit, she has started incorporating Unjury shakes a couple of times per week, but finds them a little too sweet  We talked about ways to improve the taste of the Unjury shakes and different protein powders to try  Reviewed 24 hour recall, which revealed an inadequate po intake, and discussed ways to increase kcal, protein, and fluid intakes and optimize nutrient intake  Also reviewed the importance of wt management throughout the tx process and the role of a high kcal/ high protein diet in managing wt and overall health  Based on today's assessment, discussion included: MNT for: wt loss, a high kcal/protein diet & food choices to include at all meals & snacks (Examples of high kcal foods: cheese, full-fat dairy products, nut butter, plant-based fats, coconut oil/milk, avocado, butter, cream soup, etc  Examples of high protein foods: eggs, chicken, fish, beans/legumes, nuts/nut butters, bone broth, etc ) , a post RNY Gastric Bypass diet & food choices to include at all meals & snacks, adequate hydration & fluid choices, sipping on calorie containing beverages (examples include: adding whole milk or cream to coffee, oral nutrition supplements, juice, electrolyte replacement beverages, milk, etc ), eating smaller more frequent meals every 2-3 hours (5-6 small meals/day), increasing oral nutrition supplements and recipe suggestions/resources, trying new recipes, & cooking at home more often     Moving forward, David Ashraf was encouraged to increase kcal, protein, and fluid intakes  Materials Provided: Orgain samples and Orgain coupons  All questions and concerns addressed during todays visit  David Ashraf has RD contact information  Nutrition Diagnosis:  Inadequate Energy Intake related to physiological causes, disease state and treatment related issues as evidenced by food recall, wt loss and discussion with pt and/or family  Increased Nutrient Needs (kcal & pro) related to increased demand for nutrients and disease state as evidenced by cancer dx and pt undergoing tx for cancer    Patient has clinical indicators (or ASPEN criteria) consistent with severe protein-calorie malnutrition in the context of Chronic Illness as evidenced by >7 5% wt loss in 3 months, </=75% energy intake vs  Estimated needs for >/=1 month and mild  muscle depletion ([place]) - hollowing/scooping/depression  Monitoring & Evaluation:   Goals:  · weight maintenance/stabilization  · adequate nutrition impact symptom management  · pt to meet >/=75% estimated nutrition needs daily   · Eat 5-6 small meals per day  · Try protein powder mixed with Fairlife milk  · Drink 1 protein shake daily    · Progress Towards Goals: Progressing and New Goal(s) Added    Nutrition Rx & Recommendations:  · Diet: High Calorie, High Protein (for high calorie foods see pages 52-53, and for high protein foods see pages 49-51 in your Eating Hints book)  · Small, frequent meals/snacks may be easier to tolerate than 3 large daily meals  Aim for 5-6 small meals per day (every 2-3 hours)  · Include protein at all meals/snacks  · For appetite loss: try powdered or liquid nutrition supplements; eating by the clock every 2-3 hours; set a timer to remind yourself to eat, keep snacks nearby; add extra protein & calories to your diet; drink liquids in between meals, choose liquids that add calories; eat a bedtime snack; eat soft, cool or frozen foods; eat a larger meal when you feel well & rested; only sip small amounts of liquids during meals (see pages 10-12 in your Eating Hints book)  · For weight loss: monitor your weight at home at least 2x/week, record your weight, start by adding 250-500 extra calories per day, eat 5-6 small scheduled meals every 2-3 hrs, choose foods that are high in protein and calories (see pages 49-53 in your Eating Hints book), drink liquids with calories for example: milkshakes/smoothies/juice/soup/whole milk/chocolate milk, cook with protein fortified milk (see recipe on page 36 in your Eating Hints book), consider ready-to-drink oral nutrition supplements such as Ensure Plus, Ensure Enlive, Boost Plus, or Boost Very High Calorie, avoid "diet" and "light" foods when possible, avoid drinking too much with meals, contact your dietitian with any continued weight loss over the course of 1 week  For more info see pages 35-37 in your Eating Hints book  · Weigh yourself regularly  If you notice weight loss, make an effort to increase your daily food/calorie intake  If you continue to notice loss after these efforts, reach out to your dietitian to establish a plan to stabilize weight  · Nutrition Supplements:  1 protein shake daily  · Try protein powder mixed with Fairlife milk  · Try making a weaker or diluted lemonade and use the whole pkt of Unjury  · Try unflavored Unjury mixed into other beverages  · Try Orgain protein powder samples  · Snack ideas: [place] yogurt with fresh fruit, veggies with humus, pretzels with peanut butter, grapes and cheese, watermelon with a cheese stick , etc   · Lifetime vitamin/mineral supplementation recommended for Gastric Bypass Surgery:  · Multivitamin BID  · Iron 45 mg daily  · Vitamin D daily  · Calcium Citrate 500 mg TID (do not take with iron, needs to be 2 hrs apart from iron)  · B12 1000 mcg daily    Resources for recipes: GoEuro, cancerdietitian  com, oncDecision Rocket[employer] org/support/nutrition-and-cancer, SkinFreta.lÃ¡, www oncologynutrition  org/erfc/recipes, www cookforyourlife  org, The Essential Cancer Treatment Nutrition Guide and Cookbook: Includes 909 Healthy and Delicious Recipes      Follow Up Plan: 9/20/21 during infusion   Recommend Referral to Other Providers: none at this time

## 2021-08-24 ENCOUNTER — TELEPHONE (OUTPATIENT)
Dept: GYNECOLOGIC ONCOLOGY | Facility: CLINIC | Age: 66
End: 2021-08-24

## 2021-08-24 DIAGNOSIS — C54.1 ENDOMETRIAL CANCER (HCC): Primary | ICD-10-CM

## 2021-08-24 NOTE — TELEPHONE ENCOUNTER
Patient would like a phone call back regarding lovenox refill   Patient can be reached at [phone]

## 2021-08-27 ENCOUNTER — TELEPHONE (OUTPATIENT)
Dept: INFUSION CENTER | Facility: CLINIC | Age: 66
End: 2021-08-27

## 2021-08-27 ENCOUNTER — DOCUMENTATION (OUTPATIENT)
Dept: OTHER | Facility: HOSPITAL | Age: 66
End: 2021-08-27

## 2021-08-27 ENCOUNTER — OFFICE VISIT (OUTPATIENT)
Dept: GYNECOLOGIC ONCOLOGY | Facility: CLINIC | Age: 66
End: 2021-08-27
Payer: COMMERCIAL

## 2021-08-27 ENCOUNTER — HOSPITAL ENCOUNTER (OUTPATIENT)
Dept: INFUSION CENTER | Facility: HOSPITAL | Age: 66
Discharge: HOME | End: 2021-08-27
Payer: COMMERCIAL

## 2021-08-27 ENCOUNTER — APPOINTMENT (OUTPATIENT)
Dept: LAB | Facility: AMBULARY SURGERY CENTER | Age: 66
End: 2021-08-27
Payer: COMMERCIAL

## 2021-08-27 VITALS
BODY MASS INDEX: 32.38 KG/M2 | RESPIRATION RATE: 16 BRPM | DIASTOLIC BLOOD PRESSURE: 72 MMHG | HEART RATE: 70 BPM | SYSTOLIC BLOOD PRESSURE: 126 MMHG | TEMPERATURE: 98.1 F | HEIGHT: 59 IN | WEIGHT: 160.6 LBS

## 2021-08-27 VITALS — TEMPERATURE: 98.3 F

## 2021-08-27 DIAGNOSIS — C54.1 ENDOMETRIAL CANCER (HCC): Primary | ICD-10-CM

## 2021-08-27 DIAGNOSIS — I82.4Y1 ACUTE DEEP VEIN THROMBOSIS (DVT) OF PROXIMAL VEIN OF RIGHT LOWER EXTREMITY (HCC): ICD-10-CM

## 2021-08-27 DIAGNOSIS — N39.0 CHRONIC UTI: ICD-10-CM

## 2021-08-27 DIAGNOSIS — Z45.2 ENCOUNTER FOR CENTRAL LINE CARE: ICD-10-CM

## 2021-08-27 DIAGNOSIS — C54.1 ENDOMETRIAL CANCER (HCC): Primary | Chronic | ICD-10-CM

## 2021-08-27 LAB
ALBUMIN SERPL BCP-MCNC: 3.4 G/DL (ref 3.5–5)
ALP SERPL-CCNC: 159 U/L (ref 46–116)
ALT SERPL W P-5'-P-CCNC: 25 U/L (ref 12–78)
AMYLASE SERPL-CCNC: 31 IU/L (ref 25–115)
ANION GAP SERPL CALCULATED.3IONS-SCNC: 8 MMOL/L (ref 4–13)
APTT PPP: 58 SECONDS (ref 23–37)
AST SERPL W P-5'-P-CCNC: 20 U/L (ref 5–45)
BACTERIA UR QL AUTO: ABNORMAL /HPF
BASOPHILS # BLD AUTO: 0.05 THOUSANDS/ΜL (ref 0–0.1)
BASOPHILS NFR BLD AUTO: 1 % (ref 0–1)
BILIRUB SERPL-MCNC: 0.25 MG/DL (ref 0.2–1)
BILIRUB UR QL STRIP: NEGATIVE
BUN SERPL-MCNC: 16 MG/DL (ref 5–25)
CALCIUM ALBUM COR SERPL-MCNC: 9.8 MG/DL (ref 8.3–10.1)
CALCIUM SERPL-MCNC: 9.3 MG/DL (ref 8.3–10.1)
CHLORIDE SERPL-SCNC: 105 MMOL/L (ref 100–108)
CHOLEST SERPL-MCNC: 192 MG/DL (ref 50–200)
CLARITY UR: CLEAR
CO2 SERPL-SCNC: 22 MMOL/L (ref 21–32)
COLOR UR: YELLOW
CREAT SERPL-MCNC: 1 MG/DL (ref 0.6–1.3)
CREAT UR-MCNC: <13 MG/DL
EOSINOPHIL # BLD AUTO: 0.06 THOUSAND/ΜL (ref 0–0.61)
EOSINOPHIL NFR BLD AUTO: 1 % (ref 0–6)
ERYTHROCYTE [DISTWIDTH] IN BLOOD BY AUTOMATED COUNT: 14.1 % (ref 11.6–15.1)
GFR SERPL CREATININE-BSD FRML MDRD: 60 ML/MIN/1.73SQ M
GGT SERPL-CCNC: 52 U/L (ref 5–85)
GLUCOSE SERPL-MCNC: 103 MG/DL (ref 65–140)
GLUCOSE UR STRIP-MCNC: NEGATIVE MG/DL
HCT VFR BLD AUTO: 31.1 % (ref 34.8–46.1)
HGB BLD-MCNC: 10 G/DL (ref 11.5–15.4)
HGB UR QL STRIP.AUTO: NEGATIVE
HYALINE CASTS #/AREA URNS LPF: ABNORMAL /LPF
IMM GRANULOCYTES # BLD AUTO: 0.12 THOUSAND/UL (ref 0–0.2)
IMM GRANULOCYTES NFR BLD AUTO: 2 % (ref 0–2)
INR PPP: 1.05 (ref 0.84–1.19)
KETONES UR STRIP-MCNC: NEGATIVE MG/DL
LEUKOCYTE ESTERASE UR QL STRIP: ABNORMAL
LIPASE SERPL-CCNC: 156 U/L (ref 73–393)
LYMPHOCYTES # BLD AUTO: 0.85 THOUSANDS/ΜL (ref 0.6–4.47)
LYMPHOCYTES NFR BLD AUTO: 11 % (ref 14–44)
MAGNESIUM SERPL-MCNC: 2 MG/DL (ref 1.6–2.6)
MCH RBC QN AUTO: 32.8 PG (ref 26.8–34.3)
MCHC RBC AUTO-ENTMCNC: 32.2 G/DL (ref 31.4–37.4)
MCV RBC AUTO: 102 FL (ref 82–98)
MONOCYTES # BLD AUTO: 0.74 THOUSAND/ΜL (ref 0.17–1.22)
MONOCYTES NFR BLD AUTO: 10 % (ref 4–12)
NEUTROPHILS # BLD AUTO: 5.65 THOUSANDS/ΜL (ref 1.85–7.62)
NEUTS SEG NFR BLD AUTO: 75 % (ref 43–75)
NITRITE UR QL STRIP: NEGATIVE
NON-SQ EPI CELLS URNS QL MICRO: ABNORMAL /HPF
NRBC BLD AUTO-RTO: 0 /100 WBCS
PH UR STRIP.AUTO: 6.5 [PH]
PHOSPHATE SERPL-MCNC: 3.7 MG/DL (ref 2.3–4.1)
PLATELET # BLD AUTO: 301 THOUSANDS/UL (ref 149–390)
PMV BLD AUTO: 10.1 FL (ref 8.9–12.7)
POTASSIUM SERPL-SCNC: 4.1 MMOL/L (ref 3.5–5.3)
PROT SERPL-MCNC: 7.8 G/DL (ref 6.4–8.2)
PROT UR STRIP-MCNC: NEGATIVE MG/DL
PROT UR-MCNC: 7 MG/DL
PROT/CREAT UR: >0.54 MG/G{CREAT} (ref 0–0.1)
PROTHROMBIN TIME: 13.7 SECONDS (ref 11.6–14.5)
RBC # BLD AUTO: 3.05 MILLION/UL (ref 3.81–5.12)
RBC #/AREA URNS AUTO: ABNORMAL /HPF
SODIUM SERPL-SCNC: 135 MMOL/L (ref 136–145)
SP GR UR STRIP.AUTO: 1 (ref 1–1.03)
T3 SERPL-MCNC: 1.2 NG/ML (ref 0.6–1.8)
T4 FREE SERPL-MCNC: 1.14 NG/DL (ref 0.76–1.46)
TSH SERPL DL<=0.05 MIU/L-ACNC: 1 UIU/ML (ref 0.36–3.74)
UROBILINOGEN UR QL STRIP.AUTO: 0.2 E.U./DL
WBC # BLD AUTO: 7.47 THOUSAND/UL (ref 4.31–10.16)
WBC #/AREA URNS AUTO: ABNORMAL /HPF

## 2021-08-27 PROCEDURE — 82150 ASSAY OF AMYLASE: CPT | Performed by: OBSTETRICS & GYNECOLOGY

## 2021-08-27 PROCEDURE — 99215 OFFICE O/P EST HI 40 MIN: CPT | Performed by: PHYSICIAN ASSISTANT

## 2021-08-27 PROCEDURE — 83690 ASSAY OF LIPASE: CPT | Performed by: OBSTETRICS & GYNECOLOGY

## 2021-08-27 PROCEDURE — 82570 ASSAY OF URINE CREATININE: CPT

## 2021-08-27 PROCEDURE — 84480 ASSAY TRIIODOTHYRONINE (T3): CPT | Performed by: OBSTETRICS & GYNECOLOGY

## 2021-08-27 PROCEDURE — 82977 ASSAY OF GGT: CPT | Performed by: OBSTETRICS & GYNECOLOGY

## 2021-08-27 PROCEDURE — 83735 ASSAY OF MAGNESIUM: CPT | Performed by: OBSTETRICS & GYNECOLOGY

## 2021-08-27 PROCEDURE — 80053 COMPREHEN METABOLIC PANEL: CPT | Performed by: OBSTETRICS & GYNECOLOGY

## 2021-08-27 PROCEDURE — 84156 ASSAY OF PROTEIN URINE: CPT

## 2021-08-27 PROCEDURE — 84439 ASSAY OF FREE THYROXINE: CPT | Performed by: OBSTETRICS & GYNECOLOGY

## 2021-08-27 PROCEDURE — 81001 URINALYSIS AUTO W/SCOPE: CPT

## 2021-08-27 PROCEDURE — 85025 COMPLETE CBC W/AUTO DIFF WBC: CPT | Performed by: OBSTETRICS & GYNECOLOGY

## 2021-08-27 PROCEDURE — 85610 PROTHROMBIN TIME: CPT | Performed by: OBSTETRICS & GYNECOLOGY

## 2021-08-27 PROCEDURE — 82465 ASSAY BLD/SERUM CHOLESTEROL: CPT | Performed by: OBSTETRICS & GYNECOLOGY

## 2021-08-27 PROCEDURE — 84443 ASSAY THYROID STIM HORMONE: CPT | Performed by: OBSTETRICS & GYNECOLOGY

## 2021-08-27 PROCEDURE — 84100 ASSAY OF PHOSPHORUS: CPT | Performed by: OBSTETRICS & GYNECOLOGY

## 2021-08-27 PROCEDURE — 85730 THROMBOPLASTIN TIME PARTIAL: CPT | Performed by: OBSTETRICS & GYNECOLOGY

## 2021-08-27 RX ORDER — SODIUM CHLORIDE 9 MG/ML
20 INJECTION, SOLUTION INTRAVENOUS CONTINUOUS
Status: DISCONTINUED | OUTPATIENT
Start: 2021-08-30 | End: 2021-08-31 | Stop reason: HOSPADM

## 2021-08-27 NOTE — PROGRESS NOTES
Patient was seen in the office for her Cycle 13 Day 1 pre treatment office visit [facility]  with Vanessa Burks  Patient AEs and Conmeds were reviewed  Patient denied changes to her conmeds, but reported that she is experiencing consistant UTI'S only having relief with antibiotics for a week then it returns  Patient Complained of mild pain around her nephrostomy tube that is alleviated with OTC tylenol  She denies fever  Patient is being followed by Urology now for this and they have consulted infectious disease, her appointment is set for 9/1/2021  Patient expressed improvement in her constipation with more frequent bowel movements  Patient also expressed more of an appetite and is trying to use high protein foods  Future appointments were reviewed  Overall, patient states that she is feeling okay, and was told to call with any questions or concerns

## 2021-08-27 NOTE — TELEPHONE ENCOUNTER
Patient contacted at this time and informed effective 08/16/2021 there is a no visitor policy at the infusion center  Patient verbalized understanding

## 2021-08-27 NOTE — PROGRESS NOTES
Assessment/Plan:    Problem List Items Addressed This Visit        Cardiovascular and Mediastinum    Acute deep vein thrombosis (DVT) of proximal vein of lower extremity (HCC)    Relevant Medications    enoxaparin (LOVENOX) 100 mg/mL       Genitourinary    Endometrial cancer (HCC) - Primary (Chronic)     Recurrent stage IB endometrial cancer currently receiving treatment on ENDOBARR clinical trial  She is tolerating treatment well  Her treatment and malignancy related symptoms are well controlled  Continue with cycle 13 of treatment  Labs from 8/27/21 reviewed  Plan for repeat imaging per clinical trial protocol  Return to the office as scheduled  Chronic UTI     Urinalysis pending  Patient afebrile  Symptoms minimal                   CHIEF COMPLAINT:   Pre-treatment evaluation while on clinical trial    Problem:  Cancer Staging  Endometrial cancer ([place] [id number] )  Staging form: Corpus Uteri - Carcinoma, AJCC 7th Edition  - Clinical stage from 12/19/2017: FIGO Stage IB (T1b, N0, M0) - Signed by Apryl Bear MD on 2/12/2018        Previous therapy:  Oncology History   [facility]   11/17/2017 Initial Diagnosis    [facility] )     11/17/2017 Biopsy    ENDOMETRIAL BIOPSY: WELL DIFFERENTIATED ENDOMETRIAL ADENOCARCINOMA (FIGO I) WITH FOCALMUCINOUS FEATURES  Part B: ENDOCERVICAL POLYP:BENIGN ENDOCERVICAL      12/19/2017 Surgery    Robotic assisted total laparoscopic hysterectomy with bilateral salpingo-oophorectomy and sentinel bilateral pelvic lymph node dissection  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 (4 4 x 3 2 cm tumor, 9 4/15 4mm invasion, NO LVSI, washings revealed atypical cellular changes)     12/19/2017 Genetic Testing    Morrison testing negative     6/28/2019 Biopsy    A  Breast, Right, US BX Right Breast 1000 4 cmfn:  - Benign breast tissue with focal histiocytic aggregate  See comment   - Negative for atypia and in-situ or invasive carcinoma  7/8/2019 Recurrence    Presented with right lower extremity DVT and CT demonstrating right pelvic sidewall mass with venous, ureteral and nerve compression causing significant neuropathic pain  Biopsy:  Lymph Node, Right pelvic lymph node x3:  - High-grade adenocarcinoma  7/30/2019 - 1/6/2020 Chemotherapy    Taxol 175 mg/m2 and carboplatin AUC 6 every 21 days  Dose was reduced to taxol 135 mg/m2 and carboplatin AUC 5  Completed 6 cycles  Treatment protracted due to multiple hospital admissions  2/24/2020 - 4/13/2020 Radiation    adjuvant external beam radiation therapy to the whole pelvis to 4500 cGy followed by boost to gross disease of an additional 2200 cGy     11/23/2020 Progression    New necrotic adenopathy in the retroperitoneum on CT      12/21/2020 -  Chemotherapy    Began chemotherapy with rucaparib, atezolizumab, and bevacizumab as per [facility] clinical trial            Patient ID: Dolores Cross is a 63 y o  female  who presents to the office for pre-cycle 13 on ENDOBARR clinical trial  Overall, she is tolerating treatment well  The patient has been afebrile  She denies nausea or vomiting  Appetite is appropriate  She is without abdominal pain  The patient notes pressure at the site of her right nephrostomy tube, and she feels as though she as a UTI  Urinalysis is pending  The patient denies headaches, blood pressure concerns  Quality of life is good  The following portions of the patient's history were reviewed and updated as appropriate: allergies, current medications, past medical history, past surgical history and problem list     Review of Systems   Constitutional: Negative  HENT: Negative  Eyes: Negative  Respiratory: Negative  Cardiovascular: Negative  Gastrointestinal: Negative  Genitourinary:        As per HPI  Musculoskeletal: Negative  Skin: Negative  Neurological: Negative  Psychiatric/Behavioral: Negative          Current Outpatient Medications   Medication Sig Dispense Refill    ARIPiprazole (ABILIFY) 10 mg tablet Take 10 mg by mouth daily      aspirin (ECOTRIN LOW STRENGTH) 81 mg EC tablet Take 81 mg by mouth daily      Calcium-Magnesium-Vitamin D (CALCIUM 500 PO) Take by mouth daily       cholecalciferol (VITAMIN D3) 1,000 units tablet Take 1,000 Units by mouth daily      Cyanocobalamin (VITAMIN B12 PO) Take by mouth daily       folic acid (FOLVITE) 1 mg tablet Take 1 tablet (1 mg total) by mouth daily  0    gemfibrozil (LOPID) 600 mg tablet TAKE 1 TABLET BY MOUTH EVERY DAY 90 tablet 1    lactulose (CHRONULAC) 10 g/15 mL solution TAKE 45 ML (30 G TOTAL) BY MOUTH 2 (TWO) TIMES A  mL 2    LORazepam (ATIVAN) 0 5 mg tablet Take 1 tablet (0 5 mg total) by mouth every 6 (six) hours as needed for anxiety (or nausea) 36 tablet 1    Multiple Vitamin (MULTIVITAMIN) tablet Take 1 tablet by mouth daily      Myrbetriq 50 MG TB24 TAKE 1 TABLET BY MOUTH EVERY DAY 30 tablet 5    omeprazole (PriLOSEC) 20 mg delayed release capsule Take 20 mg by mouth daily      ondansetron (ZOFRAN) 8 mg tablet Take 1 tablet (8 mg total) by mouth every 8 (eight) hours as needed for nausea or vomiting 30 tablet 1    oxybutynin (DITROPAN XL) 15 MG 24 hr tablet Take 1 tablet (15 mg total) by mouth daily at bedtime 90 tablet 3    phenazopyridine (PYRIDIUM) 200 mg tablet Take 1 tablet (200 mg total) by mouth daily as needed for bladder spasms 30 tablet 0    quinapril (ACCUPRIL) 5 mg tablet TAKE 1 TABLET BY MOUTH EVERYDAY AT BEDTIME 90 tablet 1    rucaparib (RUBRACA) 300 mg tablet Take 600 mg by mouth every 12 (twelve) hours Take with or without food  Do not repeat a vomited dose        saccharomyces boulardii (FLORASTOR) 250 mg capsule Take 1 capsule (250 mg total) by mouth 2 (two) times a day  0    Sodium Chloride Flush (Normal Saline Flush) 0 9 % SOLN       venlafaxine (EFFEXOR) 75 mg tablet Take 75 mg by mouth 3 (three) times a day        acetaminophen (TYLENOL) 325 mg tablet Take 2 tablets (650 mg total) by mouth every 6 (six) hours as needed for mild pain, headaches or fever (Patient not taking: Reported on 8/6/2021) 30 tablet 0    acetaminophen-codeine (TYLENOL #3) 300-30 mg per tablet Take 1 tablet by mouth every 6 (six) hours as needed for moderate pain (Patient not taking: Reported on 8/6/2021) 20 tablet 0    enoxaparin (LOVENOX) 100 mg/mL Inject 1 mL (100 mg total) under the skin every 24 hours 30 mL 2    sodium chloride, PF, 0 9 % 10 mL by Intracatheter route daily for 30 doses 10cc syringes 300 mL 3     No current facility-administered medications for this visit  Objective:    Blood pressure 126/72, pulse 70, temperature 98 1 °F (36 7 °C), temperature source Temporal, resp  rate 16, height 4' 11" (1 499 m), weight 72 8 kg (160 lb 9 6 oz), not currently breastfeeding  Body mass index is 32 44 kg/m²  Body surface area is 1 68 meters squared  Physical Exam  Vitals reviewed  Constitutional:       General: She is not in acute distress  Appearance: Normal appearance  She is not ill-appearing  HENT:      Head: Normocephalic and atraumatic  Mouth/Throat:      Mouth: Mucous membranes are moist    Eyes:      General: No scleral icterus  Right eye: No discharge  Left eye: No discharge  Conjunctiva/sclera: Conjunctivae normal    Pulmonary:      Effort: Pulmonary effort is normal    Musculoskeletal:      Right lower leg: No edema  Left lower leg: No edema  Skin:     General: Skin is warm and dry  Coloration: Skin is not jaundiced  Findings: No rash  Neurological:      General: No focal deficit present  Mental Status: She is alert and oriented to person, place, and time  Cranial Nerves: No cranial nerve deficit  Sensory: No sensory deficit  Motor: No weakness        Gait: Gait normal    Psychiatric:         Mood and Affect: Mood normal          Behavior: Behavior normal          Thought Content: Thought content normal          Judgment: Judgment normal      Performance status is one         Lab Results   Component Value Date     10/27/2017    K 4 1 08/27/2021     08/27/2021    CO2 22 08/27/2021    BUN 16 08/27/2021    CREATININE 1 00 08/27/2021    GLUCOSE 219 (H) 12/19/2017    GLUF 105 (H) 04/02/2021    CALCIUM 9 3 08/27/2021    CORRECTEDCA 9 8 08/27/2021    AST 20 08/27/2021    ALT 25 08/27/2021    ALKPHOS 159 (H) 08/27/2021    PROT 6 9 10/27/2017    BILITOT 0 3 10/27/2017    EGFR 60 08/27/2021     Lab Results   Component Value Date    WBC 7 47 08/27/2021    HGB 10 0 (L) 08/27/2021    HCT 31 1 (L) 08/27/2021     (H) 08/27/2021     08/27/2021     Lab Results   Component Value Date    NEUTROABS 5 65 08/27/2021

## 2021-08-27 NOTE — PROGRESS NOTES
Port flushed and central labs drawn  Patient unable to give urine sample at this time  Patient given urine cup and urine lab order papers, she will take to outpatient lab near her house when she is able to give a sample

## 2021-08-27 NOTE — ASSESSMENT & PLAN NOTE
Recurrent stage IB endometrial cancer currently receiving treatment on ENDOBARR clinical trial  She is tolerating treatment well  Her treatment and malignancy related symptoms are well controlled  Continue with cycle 13 of treatment  Labs from 8/27/21 reviewed  Plan for repeat imaging per clinical trial protocol  Return to the office as scheduled

## 2021-08-27 NOTE — PROGRESS NOTES
Labs have been reviewed and signed by [place]  Per protocol, patient is ok to proceed with Cycle 13 Day 1 treatment on Monday 8/30/21

## 2021-08-30 ENCOUNTER — DOCUMENTATION (OUTPATIENT)
Dept: OTHER | Facility: HOSPITAL | Age: 66
End: 2021-08-30

## 2021-08-30 ENCOUNTER — NUTRITION (OUTPATIENT)
Dept: NUTRITION | Facility: CLINIC | Age: 66
End: 2021-08-30

## 2021-08-30 ENCOUNTER — HOSPITAL ENCOUNTER (OUTPATIENT)
Dept: INFUSION CENTER | Facility: CLINIC | Age: 66
Discharge: HOME | End: 2021-08-30
Payer: COMMERCIAL

## 2021-08-30 VITALS
WEIGHT: 154 LBS | HEART RATE: 67 BPM | TEMPERATURE: 97.3 F | DIASTOLIC BLOOD PRESSURE: 61 MMHG | BODY MASS INDEX: 31.1 KG/M2 | OXYGEN SATURATION: 99 % | SYSTOLIC BLOOD PRESSURE: 138 MMHG | RESPIRATION RATE: 16 BRPM

## 2021-08-30 DIAGNOSIS — C54.1 ENDOMETRIAL CANCER (HCC): ICD-10-CM

## 2021-08-30 DIAGNOSIS — Z45.2 ENCOUNTER FOR CENTRAL LINE CARE: Primary | ICD-10-CM

## 2021-08-30 DIAGNOSIS — Z71.3 NUTRITIONAL COUNSELING: Primary | ICD-10-CM

## 2021-08-30 PROCEDURE — J9999Q0 INV ATEZOLIZUMAB 1200MG/20ML 20 ML: Performed by: PHYSICIAN ASSISTANT

## 2021-08-30 PROCEDURE — 96417 CHEMO IV INFUS EACH ADDL SEQ: CPT

## 2021-08-30 PROCEDURE — J9035Q0 INV BEVACIZUMAB 400MG/16ML 16 ML: Performed by: PHYSICIAN ASSISTANT

## 2021-08-30 PROCEDURE — 96413 CHEMO IV INFUSION 1 HR: CPT

## 2021-08-30 RX ADMIN — Medication 1089 MG: at 10:00

## 2021-08-30 RX ADMIN — Medication 1200 MG: at 09:21

## 2021-08-30 RX ADMIN — SODIUM CHLORIDE 20 ML/HR: 0.9 INJECTION, SOLUTION INTRAVENOUS at 08:30

## 2021-08-30 NOTE — PROGRESS NOTES
Patient is here for chemo  She offers no complaints at this time  Labs reviewed and signed and meet parameters   Per Clarita Owens Rn from clinical trials patient is ok for treatment today as well

## 2021-08-30 NOTE — PROGRESS NOTES
Patient was seen in [facility] for [facility] Day 1 treatment  Patient was given Cycle 13 Rucaparib pills and drug diary, and patient returned Cycle 12 study pills, and diary  Patient denied any changes to AE'S and conmeds reviewed   Patient was told to call with any questions or concerns, Patient tolerated treatment without incident today  Patient was also informed of study lab draw for cycles 1-10, protocol only required cycles 1-8 patient verbalized understanding of information mentioned

## 2021-08-30 NOTE — PATIENT INSTRUCTIONS
Nutrition Rx & Recommendations:  · Diet: High Calorie, High Protein (for high calorie foods see pages 52-53, and for high protein foods see pages 49-51 in your Eating Hints book)  · Small, frequent meals/snacks may be easier to tolerate than 3 large daily meals  Aim for 5-6 small meals per day (every 2-3 hours)  · Include protein at all meals/snacks  · For appetite loss: try powdered or liquid nutrition supplements; eating by the clock every 2-3 hours; set a timer to remind yourself to eat, keep snacks nearby; add extra protein & calories to your diet; drink liquids in between meals, choose liquids that add calories; eat a bedtime snack; eat soft, cool or frozen foods; eat a larger meal when you feel well & rested; only sip small amounts of liquids during meals (see pages 10-12 in your Eating Hints book)  · For weight loss: monitor your weight at home at least 2x/week, record your weight, start by adding 250-500 extra calories per day, eat 5-6 small scheduled meals every 2-3 hrs, choose foods that are high in protein and calories (see pages 49-53 in your Eating Hints book), drink liquids with calories for example: milkshakes/smoothies/juice/soup/whole milk/chocolate milk, cook with protein fortified milk (see recipe on page 36 in your Eating Hints book), consider ready-to-drink oral nutrition supplements such as Ensure Plus, Ensure Enlive, Boost Plus, or Boost Very High Calorie, avoid "diet" and "light" foods when possible, avoid drinking too much with meals, contact your dietitian with any continued weight loss over the course of 1 week  For more info see pages 35-37 in your Eating Hints book  · Weigh yourself regularly  If you notice weight loss, make an effort to increase your daily food/calorie intake  If you continue to notice loss after these efforts, reach out to your dietitian to establish a plan to stabilize weight     · Nutrition Supplements:  1 protein shake daily  · Try protein powder mixed with Fairlife milk  · Try making a weaker or diluted lemonade and use the whole pkt of Unjury  · Try unflavored Unjury mixed into other beverages  · Try Orgain protein powder samples  · Snack ideas: [place] yogurt with fresh fruit, veggies with humus, pretzels with peanut butter, grapes and cheese, watermelon with a cheese stick , etc   · Lifetime vitamin/mineral supplementation recommended for Gastric Bypass Surgery:  · Multivitamin BID  · Iron 45 mg daily  · Vitamin D daily  · Calcium Citrate 500 mg TID (do not take with iron, needs to be 2 hrs apart from iron)  · B12 1000 mcg daily    Resources for recipes: [employer], cancerdietitian  com, once[employer]ulletk org/support/nutrition-and-cancer, Skinnytaste  com, www oncologynutrition  org/erfc/recipes, www cookfor[employer]  org, The Essential Cancer Treatment Nutrition Guide and Cookbook: Includes 800 Healthy and Delicious Recipes      Follow Up Plan: 9/20/21 during infusion   Recommend Referral to Other Providers: none at this time

## 2021-09-01 ENCOUNTER — OFFICE VISIT (OUTPATIENT)
Dept: INFECTIOUS DISEASES | Facility: CLINIC | Age: 66
End: 2021-09-01
Payer: COMMERCIAL

## 2021-09-01 VITALS
HEART RATE: 81 BPM | HEIGHT: 59 IN | BODY MASS INDEX: 31.65 KG/M2 | TEMPERATURE: 96.9 F | RESPIRATION RATE: 16 BRPM | SYSTOLIC BLOOD PRESSURE: 124 MMHG | DIASTOLIC BLOOD PRESSURE: 76 MMHG | WEIGHT: 157 LBS

## 2021-09-01 DIAGNOSIS — C54.1 ENDOMETRIAL CANCER (HCC): Chronic | ICD-10-CM

## 2021-09-01 DIAGNOSIS — N32.81 OVERACTIVE BLADDER: ICD-10-CM

## 2021-09-01 DIAGNOSIS — N39.0 RECURRENT UTI: Primary | ICD-10-CM

## 2021-09-01 DIAGNOSIS — N95.2 ATROPHIC VAGINITIS: ICD-10-CM

## 2021-09-01 PROCEDURE — 3008F BODY MASS INDEX DOCD: CPT | Performed by: INTERNAL MEDICINE

## 2021-09-01 PROCEDURE — 1036F TOBACCO NON-USER: CPT | Performed by: INTERNAL MEDICINE

## 2021-09-01 PROCEDURE — 99214 OFFICE O/P EST MOD 30 MIN: CPT | Performed by: INTERNAL MEDICINE

## 2021-09-01 RX ORDER — AMOXICILLIN 500 MG/1
500 CAPSULE ORAL EVERY 8 HOURS SCHEDULED
Qty: 42 CAPSULE | Refills: 0 | Status: SHIPPED | OUTPATIENT
Start: 2021-09-01 | End: 2021-09-15

## 2021-09-01 NOTE — PROGRESS NOTES
Consultation - Infectious Disease   [facility] 63 [facility] MRN: [id number]  Unit/Bed#:  Encounter: [id number]      IMPRESSION & RECOMMENDATIONS:   Impression/Recommendations: This is a 63 y o  female,  with endometrial carcinoma, on experimental therapy since November 2020, atrophic vaginitis and overactive bladder,  Referred for chronic dysuria  1    Chronic dysuria with bacteriuria  Patient has multiple urine culture growing aerococcus and occasionally Candida  Although dysuria is persistent, it is stable, not worsening and with no other signs and symptoms of UTI, I suspect that her dysuria is not secondary to UTI  In addition, patient never had systemic symptoms or fever  Her aerococcus in urine culture is most likely bladder colonization  However, patient has not received optimal antibiotic course for for Aerococcus in her urine  Patient's last 2 antibiotic courses have been nitrofurantoin  I will retreat her with her prolonged course of amoxicillin  If dysuria does not improve with this course of amoxicillin, I recommend no further antibiotic  Given chronicity of symptoms, will treat patient with a more prolonged antibiotic course  Trial of amoxicillin x 14 day course  If the dysuria is unchanged with amoxicillin, no further antibiotic  2    Endometrial carcinoma  Patient failed conventional chemotherapy and is currently on experimental regimen  Interestingly, patient dysuria developed shortly after starting experimental chemotherapy regimen  Consider dysuria as side effect of this regimen  3   Overactive bladder  Patient is on oxybutynin a might be trach  This may contribute to dysuria  Treatment and follow-up per Urology  4  Atrophic vaginitis  This may also contribute to dysuria  Patient is on   symptomatic treatment for this  Treatment per Urology and gyn Oncology  5   Right ureteral obstruction, status post right PCN        6  Cephalosporin allergy, with rash   Patient had tolerated other cephalosporins in the past     Monitor for cross allergic reaction on amoxicillin  Outpatient records reviewed in detail  Discussed with patient and her sister, who accompanies her, in detail regarding the above plan  Follow-up with us p paris arnold       Thank you for this consultation  HISTORY OF PRESENT ILLNESS:  Reason for Consult:  Recurrent UTI     HPI: Trey Andres is a 63 y o  female,   With multiple medical problems including endometrial carcinoma, on experimental therapy, with a trophic vaginitis and overactive bladder, has had problem with dysuria over last 6 months  Patient has been treated with multiple courses of antibiotic  Patient states that initially, dysuria with resolved with antibiotic  However, over last 1-2 months, patient has constant dysuria  No fever or chills  No suprapubic or flank pain  As in above, patient has history of endometrial carcinoma  She has failed conventional chemotherapy and is currently on experimental regimen, started in November 2020  She also has right ureteral obstruction, status post right PCN placement  At present, patient has mild, stable, chronic, baseline dysuria  She has no other focal complaints  REVIEW OF SYSTEMS:  A complete system-based review of systems was done  Except for what is noted in HPI above, ROS is otherwise negative      PAST MEDICAL HISTORY:  Past Medical History:   Diagnosis Date    Anemia     Chemotherapy follow-up examination     Depression     [facility] 12/2017    Hyperglycemia     vx type 2 dm -- last assessed 4/1/14; resolved 11/7/17    Hyperlipidemia     Hypertension     Obesity     last assessed 10/14/17; resolved 11/7/17     Past Surgical History:   Procedure Laterality Date    ABDOMINAL SURGERY      GASTRIC BYPASS    CHOLECYSTECTOMY      at the time of gastric bypass    COLONOSCOPY      CT NEEDLE BIOPSY LYMPH NODE  7/8/2019    FL GUIDED CENTRAL VENOUS ACCESS DEVICE INSERTION  11/12/2019    GASTRIC BYPASS      HYSTERECTOMY Bilateral     total abdominal with salpingo-oophorectomy    IR NEPHROSTOMY TUBE PLACEMENT  7/26/2019    IR NEPHROURETERAL STENT CHECK/CHANGE/REPOSITION  4/6/2021    IR NEPHROURETERAL STENT CHECK/CHANGE/REPOSITION  6/4/2021    IR PICC LINE  9/27/2019    IR PORT PLACEMENT  7/26/2019    IR PORT REMOVAL  9/20/2019    OOPHORECTOMY Bilateral     PA INSJ TUNNELED CTR VAD W/SUBQ PORT AGE 5 YR/> Left 11/12/2019    Procedure: INSERTION VENOUS PORT ( PORT-A-CATH) IR;  Surgeon: Arleen Cash DO;  [facility];  Service: Interventional Radiology    PA LAP, RADICAL HYST W/ TUBE&OV, NODE BX N/A 12/19/2017    Procedure: HYSTERECTOMY LAPAROSCOPIC TOTAL ([address]) W/ ROBOTICS; BILATERAL SALPINGOOPHERECTOMY; LYMPH NODE DISSECTION; lysis of adhesions;  Surgeon: Matthias Richardson MD;  [facility]: [facility];  Service: Gynecology Oncology    PA LAP,DIAGNOSTIC ABDOMEN N/A 12/19/2017    Procedure: LAPAROSCOPY DIAGNOSTIC;  Surgeon: Matthias Richardson MD;  [facility];  Service: Gynecology Oncology    TONSILLECTOMY      US GUIDED BREAST BIOPSY RIGHT COMPLETE Right 6/28/2019     Problem list reviewed  FAMILY HISTORY:  Non-contributory    SOCIAL HISTORY:  Social History     Substance and Sexual Activity   Alcohol Use Not Currently     Social History     Substance and Sexual Activity   Drug Use No     Social History     Tobacco Use   Smoking Status Never Smoker   Smokeless Tobacco Never Used       ALLERGIES:  Allergies   Allergen Reactions    Cephalosporins Rash     Which Cephalosporin reaction was to not specified; however, has tolerated Amoxicillin, Cefazolin, and Cefepime       MEDICATIONS:  All current active medications have been reviewed  Patient is currently not on any antibiotic      PHYSICAL EXAM:  Vitals:  Temperature: (!) 96 9 °F (36 1 °C)     Physical Exam:  General:  Well-nourished, well-developed, chronically ill-appearing,in no acute distress  Awake, alert and oriented x 3  Eyes:  Conjunctive clear with no hemorrhages or effusions  Oropharynx:  No ulcers, no lesions, pharynx benign, no tonsillitis  Neck:  Supple, no lymphadenopathy, no mass, nontender  Lungs:  Expansion symmetric, no rales, no wheezing, no accessory muscle use  Cardiac:  Regular rate and rhythm, normal S1, normal S2, no murmurs  Abdomen:  Soft, nondistended, non-tender, no HSM  Extremities:  No edema, no erythema, nontender  No ulcers  Skin:  No rashes, no ulcers  Neurological:  Moves all four extremities spontaneously, sensation grossly intact    LABS, IMAGING, & OTHER STUDIES:  Lab Results:  I have personally reviewed pertinent labs  Results from last 7 days   Lab Units 08/27/21  0824   POTASSIUM mmol/L 4 1   CHLORIDE mmol/L 105   CO2 mmol/L 22   BUN mg/dL 16   CREATININE mg/dL 1 00   EGFR ml/min/1 73sq m 60   CALCIUM mg/dL 9 3   AST U/L 20   ALT U/L 25   ALK PHOS U/L 159*     Results from last 7 days   Lab Units 08/27/21  0824   WBC Thousand/uL 7 47   HEMOGLOBIN g/dL 10 0*   HEMATOCRIT % 31 1*   PLATELETS Thousands/uL 301   NEUTROS PCT % 75   MONOS PCT % 10           Imaging Studies:   I have personally reviewed pertinent imaging study reports and images in PACS  EKG, Pathology, and Other Studies:   I have personally reviewed pertinent reports

## 2021-09-01 NOTE — H&P (VIEW-ONLY)
Consultation - Infectious Disease   Eliane Worley 63 y o  female MRN: [id number]  Unit/Bed#:  Encounter: [id number]      IMPRESSION & RECOMMENDATIONS:   Impression/Recommendations: This is a 63 y o  female,  with endometrial carcinoma, on experimental therapy since November 2020, atrophic vaginitis and overactive bladder,  Referred for chronic dysuria  1    Chronic dysuria with bacteriuria  Patient has multiple urine culture growing aerococcus and occasionally Candida  Although dysuria is persistent, it is stable, not worsening and with no other signs and symptoms of UTI, I suspect that her dysuria is not secondary to UTI  In addition, patient never had systemic symptoms or fever  Her aerococcus in urine culture is most likely bladder colonization  However, patient has not received optimal antibiotic course for for Aerococcus in her urine  Patient's last 2 antibiotic courses have been nitrofurantoin  I will retreat her with her prolonged course of amoxicillin  If dysuria does not improve with this course of amoxicillin, I recommend no further antibiotic  Given chronicity of symptoms, will treat patient with a more prolonged antibiotic course  Trial of amoxicillin x 14 day course  If the dysuria is unchanged with amoxicillin, no further antibiotic  2    Endometrial carcinoma  Patient failed conventional chemotherapy and is currently on experimental regimen  Interestingly, patient dysuria developed shortly after starting experimental chemotherapy regimen  Consider dysuria as side effect of this regimen  3   Overactive bladder  Patient is on oxybutynin a might be trach  This may contribute to dysuria  Treatment and follow-up per Urology  4  Atrophic vaginitis  This may also contribute to dysuria  Patient is on   symptomatic treatment for this  Treatment per Urology and gyn Oncology  5   Right ureteral obstruction, status post right PCN        6  Cephalosporin allergy, with rash   Patient had tolerated other cephalosporins in the past     Monitor for cross allergic reaction on amoxicillin  Outpatient records reviewed in detail  Discussed with patient and her sister, who accompanies her, in detail regarding the above plan  Follow-up with us p paris arnold       Thank you for this consultation  HISTORY OF PRESENT ILLNESS:  Reason for Consult:  Recurrent UTI     HPI: Breonna Arevalo is a 63 y o  female,   With multiple medical problems including endometrial carcinoma, on experimental therapy, with a trophic vaginitis and overactive bladder, has had problem with dysuria over last 6 months  Patient has been treated with multiple courses of antibiotic  Patient states that initially, dysuria with resolved with antibiotic  However, over last 1-2 months, patient has constant dysuria  No fever or chills  No suprapubic or flank pain  As in above, patient has history of endometrial carcinoma  She has failed conventional chemotherapy and is currently on experimental regimen, started in November 2020  She also has right ureteral obstruction, status post right PCN placement  At present, patient has mild, stable, chronic, baseline dysuria  She has no other focal complaints  REVIEW OF SYSTEMS:  A complete system-based review of systems was done  Except for what is noted in HPI above, ROS is otherwise negative      PAST MEDICAL HISTORY:  Past Medical History:   Diagnosis Date    Anemia     Chemotherapy follow-up examination     Depression     Endometrial cancer ([facility] [address] ) 12/2017    Hyperglycemia     vx type 2 dm -- last assessed 4/1/14; resolved 11/7/17    Hyperlipidemia     Hypertension     Obesity     last assessed 10/14/17; resolved 11/7/17     Past Surgical History:   Procedure Laterality Date    ABDOMINAL SURGERY      GASTRIC BYPASS    CHOLECYSTECTOMY      at the time of gastric bypass    COLONOSCOPY      CT NEEDLE BIOPSY LYMPH NODE  7/8/2019    FL GUIDED CENTRAL VENOUS ACCESS DEVICE INSERTION  11/12/2019    GASTRIC BYPASS      HYSTERECTOMY Bilateral     total abdominal with salpingo-oophorectomy    IR NEPHROSTOMY TUBE PLACEMENT  7/26/2019    IR NEPHROURETERAL STENT CHECK/CHANGE/REPOSITION  4/6/2021    IR NEPHROURETERAL STENT CHECK/CHANGE/REPOSITION  6/4/2021    IR PICC LINE  9/27/2019    IR PORT PLACEMENT  7/26/2019    IR PORT REMOVAL  9/20/2019    OOPHORECTOMY Bilateral     SD INSJ TUNNELED CTR VAD W/SUBQ PORT AGE 5 YR/> Left 11/12/2019    Procedure: INSERTION VENOUS PORT ( PORT-A-CATH) IR;  Surgeon: Adia Curry DO;  [facility];  Service: Interventional Radiology    SD LAP, RADICAL HYST W/ TUBE&OV, NODE BX N/A 12/19/2017    Procedure: HYSTERECTOMY LAPAROSCOPIC TOTAL ([address]) W/ ROBOTICS; BILATERAL SALPINGOOPHERECTOMY; LYMPH NODE DISSECTION; lysis of adhesions;  Surgeon: Gaby Rodarte MD;  [facility];  Service: Gynecology Oncology    SD LAP,DIAGNOSTIC ABDOMEN N/A 12/19/2017    Procedure: LAPAROSCOPY DIAGNOSTIC;  Surgeon: Gaby Rodarte MD;  [facility];  Service: Gynecology Oncology    TONSILLECTOMY      US GUIDED BREAST BIOPSY RIGHT COMPLETE Right 6/28/2019     Problem list reviewed  FAMILY HISTORY:  Non-contributory    SOCIAL HISTORY:  Social History     Substance and Sexual Activity   Alcohol Use Not Currently     Social History     Substance and Sexual Activity   Drug Use No     Social History     Tobacco Use   Smoking Status Never Smoker   Smokeless Tobacco Never Used       ALLERGIES:  Allergies   Allergen Reactions    Cephalosporins Rash     Which Cephalosporin reaction was to not specified; however, has tolerated Amoxicillin, Cefazolin, and Cefepime       MEDICATIONS:  All current active medications have been reviewed  Patient is currently not on any antibiotic      PHYSICAL EXAM:  Vitals:  Temperature: (!) 96 9 °F (36 1 °C)     Physical Exam:  General:  Well-nourished, well-developed, chronically ill-appearing,in no acute distress  Awake, alert and oriented x 3  Eyes:  Conjunctive clear with no hemorrhages or effusions  Oropharynx:  No ulcers, no lesions, pharynx benign, no tonsillitis  Neck:  Supple, no lymphadenopathy, no mass, nontender  Lungs:  Expansion symmetric, no rales, no wheezing, no accessory muscle use  Cardiac:  Regular rate and rhythm, normal S1, normal S2, no murmurs  Abdomen:  Soft, nondistended, non-tender, no HSM  Extremities:  No edema, no erythema, nontender  No ulcers  Skin:  No rashes, no ulcers  Neurological:  Moves all four extremities spontaneously, sensation grossly intact    LABS, IMAGING, & OTHER STUDIES:  Lab Results:  I have personally reviewed pertinent labs  Results from last 7 days   Lab Units 08/27/21  0824   POTASSIUM mmol/L 4 1   CHLORIDE mmol/L 105   CO2 mmol/L 22   BUN mg/dL 16   CREATININE mg/dL 1 00   EGFR ml/min/1 73sq m 60   CALCIUM mg/dL 9 3   AST U/L 20   ALT U/L 25   ALK PHOS U/L 159*     Results from last 7 days   Lab Units 08/27/21  0824   WBC Thousand/uL 7 47   HEMOGLOBIN g/dL 10 0*   HEMATOCRIT % 31 1*   PLATELETS Thousands/uL 301   NEUTROS PCT % 75   MONOS PCT % 10           Imaging Studies:   I have personally reviewed pertinent imaging study reports and images in PACS  EKG, Pathology, and Other Studies:   I have personally reviewed pertinent reports

## 2021-09-03 ENCOUNTER — HOSPITAL ENCOUNTER (OUTPATIENT)
Dept: RADIOLOGY | Facility: HOSPITAL | Age: 66
Discharge: HOME | End: 2021-09-03
Attending: RADIOLOGY | Admitting: RADIOLOGY
Payer: COMMERCIAL

## 2021-09-03 DIAGNOSIS — N13.5 OBSTRUCTION OF RIGHT URETER: ICD-10-CM

## 2021-09-03 PROCEDURE — 50387 CHANGE NEPHROURETERAL CATH: CPT | Performed by: INTERNAL MEDICINE

## 2021-09-03 PROCEDURE — C2625 STENT, NON-COR, TEM W/DEL SY: HCPCS

## 2021-09-03 PROCEDURE — C1769 GUIDE WIRE: HCPCS

## 2021-09-03 PROCEDURE — 50387 CHANGE NEPHROURETERAL CATH: CPT

## 2021-09-03 RX ORDER — LIDOCAINE WITH 8.4% SOD BICARB 0.9%(10ML)
SYRINGE (ML) INJECTION CODE/TRAUMA/SEDATION MEDICATION
Status: COMPLETED | OUTPATIENT
Start: 2021-09-03 | End: 2021-09-03

## 2021-09-03 RX ADMIN — Medication 10 ML: at 10:22

## 2021-09-03 RX ADMIN — IOHEXOL 15 ML: 350 INJECTION, SOLUTION INTRAVENOUS at 10:38

## 2021-09-03 NOTE — DISCHARGE INSTRUCTIONS
Nephrostomy Tube Care     WHAT YOU NEED TO KNOW:   A nephrostomy tube is a catheter (thin plastic tube) that is inserted through your skin and into your kidney  The nephrostomy tube drains urine from your kidney into a collecting bag outside your body  You may need a nephrostomy tube when something is blocking the normal flow of urine  A nephrostomy tube may be used for a short or a long period of time  The nephrostomy tube comes out of your back, so you will need someone to help care for your nephrostomy tube  DISCHARGE INSTRUCTIONS:      How to clean the skin around the nephrostomy tube and change the bandage:  Since the nephrostomy tube comes out of your back, you will not be able to care for it by yourself  Ask someone to follow the general directions below to check and care for your nephrostomy tube  Gather the items you will need  Disposable (single use) under-pad, and a clean washcloth  ¨ Plain soap, warm water, and new medical gloves  ¨ Sterile gauze bandages  ¨ Clear adhesive dressing or medical tape  ¨ Skin barrier  ¨ Protective skin film  ¨ Trash bag  · Remove the old bandage, and check the tube entry site  ¨ Have the patient lie on his side with the nephrostomy tube entry site facing up  Place the under-pad where it will catch drainage as you are working with the nephrostomy tube  ¨ Wash your hands with soap and water  Put on new medical gloves  ¨ Gently remove the old bandage, without pulling on the tube  Do this by holding the skin beside the tube with one hand  With the other hand, gently remove sticky tape and the skin barrier by pulling in the same direction as hair growth  Do not touch the side of the bandage that is placed over or around the tube  Throw the bandage and skin barrier away in a trash bag  ¨ Look for signs of infection, such as skin redness and swelling  Report any skin changes to healthcare providers  ¨ Clean the tube entry site      ¨ Hold the tube in place to keep it from being pulled out while you are cleaning around it  ¨ You will need to clean the area twice  For the first cleaning, wet a new gauze bandage with soap and water  Begin at the entry site of the tube  Wipe the skin in circles, moving away from the entry site  Remove blood and any other material with the gauze  Do this as often as needed  Use a new gauze bandage each time you clean the area, moving away from the entry site  ¨ For the second cleaning, wet a new gauze bandage with water  Begin at the entry site of the tube  Wipe the skin in circles, moving away from the entry site  Use a new gauze bandage each time you clean the area, moving away from the entry site  ¨ Gently pat the skin with a clean washcloth to dry it  · Apply the skin barrier and bandages  ¨ Roll up a bandage to make it thick, and place it under  the place where the tube enters the skin  Place it to support the tube, and stop it from kinking or bending  Tape the bandage in place, and apply more bandages if directed by a healthcare provider  ¨ Bring the tubing forward to the front and tape it to the skin  Do not stretch the tube tight, because this may pull the nephrostomy tube out  How often to change the bandage  Change the bandage around the tube, every other day  If your bandages  get dirty or wet, change them right away, and as often as needed  If your nephrostomy tube is to be used for a long period of time, the tube needs to be changed every 2 to 3 months  Healthcare providers will tell you when you need to make an appointment to have your tube changed  How to care for the urine drainage bag:   · Ask if you need to measure and write down how much urine is in the bag before you empty it  Drain urine out of the drainage bag when it is ½ to ? full  Open the spout at the bottom of the bag to empty the urine into the toilet  · You may need to detach the drainage bag from the nephrostomy tube to change it    If so, attach a new drainage bag tightly to the nephrostomy tube  ·   How to prevent problems with your nephrostomy tube:   · Change bandages, directed  This helps to prevent infection  Throw away or clean your drainage bag as directed by your healthcare provider  · Wipe the connecting ends of the drainage bag with alcohol before you reconnect the bag to the tube  This helps prevent infection  Keep the tube taped to your skin and connected to a drainage bag placed below the level of your kidneys  This helps prevent urine from backing up into your kidneys  You may wear a small drainage bag strapped to your leg to let you move around more easily  · Check the catheter to be sure it is in place after you change your clothes or do other activities  Do not wear tight clothing over the tube  Place the tubing over your thigh rather than under it when you are sitting down  Be sure that nothing is pulling on the nephrostomy tube when you move around  · Change positions if you see little or no urine in your drainage bag  Check to see if the urine tube is twisted or bent  Be sure that you are not sitting or lying on the tube  If there are no kinks and there is little or no urine in the drainage bag, tell your healthcare provider  · Flush out the tube as directed  Some tubes get flushed one time a day with 10 mls of NSS You will be given a prescription for the flushes  To flush the nephrostomy tube, clean both connections with alcohol swap  Twist off the drainage bag tube and twist the saline syringe into the nephrostomy tube and flush briskly  Remove the syringe and twist the drainage bag tube back into the nephrostomy tube  · Keep the site covered while you shower  Tape a piece of clear adhesive plastic over the dressing to keep it dry while you shower  Do not take tub baths      Contact Interventional Radiology at [phone] [facility] PATIENTS: Contact Interventional Radiology at [phone]) Anita Montoya PATIENTS: Contact Interventional Radiology at [phone]) if:  · The skin around the nephrostomy tube is red, swollen, itches, or has a rash  · You have a fever greater than 101 or chills  · You have lower back or hip pain  · There are changes in how your urine looks or smells  · You have little or no urine draining from the nephrostomy tube  · You have nausea and are vomiting  · The black bernardo on your tube has moved, or the tube is longer than when it was put in    · You have questions or concerns about your condition or care  · The nephrostomy tube comes out completely  · There is blood, pus, or a bad smell coming from the place where the tube enters your skin  · Urine is leaking around the tube  The following pharmacies carry the flush syringes  [facility]                     [facility]       [address] [facility]                    [id number] [place] [address]  Phone [phone]            Phone [phone]  [address] & [facility]                      [address]                                 [phone]  Phone [phone]            Phone [phone]    [facility]                                                                         [facility] [phone]  [address]   Phone [phone]

## 2021-09-03 NOTE — BRIEF OP NOTE (RAD/CATH)
INTERVENTIONAL RADIOLOGY PROCEDURE NOTE    Date: 9/3/2021    Procedure: IR NEPHROURETERAL STENT CHECK/CHANGE/REPOSITION    Preoperative diagnosis:   1  Obstruction of right ureter         Postoperative diagnosis: Same  Surgeon: Meron Alcaraz MD     Assistant: None  No qualified resident was available  Blood loss: none    Specimens: none     Findings:     Routine right 10F x 24cm nephroureteral tube exchange  Plan for routine 3 month exchange  Complications: None immediate      Anesthesia: conscious sedation

## 2021-09-03 NOTE — INTERVAL H&P NOTE
Update: (This section must be completed if the H&P was completed greater than 24 hrs to procedure or admission)    H&P reviewed  After examining the patient, I find no changed to the H&P since it had been written  63year old female with history of recurrent endometrial cancer  Right ureteral compression resulting in hydronephrosis  Presenting today for routine right nephroureteral tube exchange(10F x24cm), last exchange performed on 6/4/21    Patient re-evaluated   Accept as history and physical     Ana Whelan MD/September 3, 2021/10:05 AM
Adequate: hears normal conversation without difficulty

## 2021-09-10 ENCOUNTER — HOSPITAL ENCOUNTER (OUTPATIENT)
Dept: RADIOLOGY | Facility: HOSPITAL | Age: 66
Discharge: HOME | End: 2021-09-10
Attending: INTERNAL MEDICINE | Admitting: INTERNAL MEDICINE

## 2021-09-10 DIAGNOSIS — N13.5 OBSTRUCTION OF RIGHT URETER: ICD-10-CM

## 2021-09-10 NOTE — BRIEF OP NOTE (RAD/CATH)
IR site check    PATIENT NAME: Jammie Grijalva  : 1955  MRN: [id number]    Pre-op Diagnosis:   1  Obstruction of right ureter      Post-op Diagnosis:   1  Obstruction of right ureter        Provider:  Ledy Frey PA-C      No qualified resident was available, Resident is only observing    Findings: mild erythema and discharge around right pcnu insertion site  Suture inferior to site  Previous suture removed and new suture placed laterally per patient request  Tube flushed easily       Anesthesia: local    Ledy Frey PA-C     Date: 9/10/2021  Time: 1:26 PM

## 2021-09-10 NOTE — PROGRESS NOTES
Outpatient Oncology Nutrition Consult  Type of Consult: Follow Up  Care Location: [facility]   Nutrition Assessment:  Oncology Diagnosis & Treatments:   Endometrial cancer   -S/p surgery 12/19/17   -Recurrence 7/8/19   -S/p chemotherapy (carboplatin/taxol from 7/30/19-1/6/20)   -S/p whole pelvis RT (2/4/20- 4/13/20)   -Disease progression    -ENDOBAR trial started 12/21/20  Oncology History   Endometrial cancer ([facility] [address] )   11/17/2017 Initial Diagnosis    Endometrial cancer ([facility] [address] )     11/17/2017 Biopsy    ENDOMETRIAL BIOPSY: WELL DIFFERENTIATED ENDOMETRIAL ADENOCARCINOMA (FIGO I) WITH FOCALMUCINOUS FEATURES  Part B: ENDOCERVICAL POLYP:BENIGN ENDOCERVICAL      12/19/2017 Surgery    Robotic assisted total laparoscopic hysterectomy with bilateral salpingo-oophorectomy and sentinel bilateral pelvic lymph node dissection  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 (4 4 x 3 2 cm tumor, 9 4/15 4mm invasion, NO LVSI, washings revealed atypical cellular changes)     12/19/2017 Genetic Testing    Morrison testing negative     6/28/2019 Biopsy    A  Breast, Right, US BX Right Breast 1000 4 cmfn:  - Benign breast tissue with focal histiocytic aggregate  See comment   - Negative for atypia and in-situ or invasive carcinoma  7/8/2019 Recurrence    Presented with right lower extremity DVT and CT demonstrating right pelvic sidewall mass with venous, ureteral and nerve compression causing significant neuropathic pain  Biopsy:  Lymph Node, Right pelvic lymph node x3:  - High-grade adenocarcinoma  7/30/2019 - 1/6/2020 Chemotherapy    Taxol 175 mg/m2 and carboplatin AUC 6 every 21 days  Dose was reduced to taxol 135 mg/m2 and carboplatin AUC 5  Completed 6 cycles  Treatment protracted due to multiple hospital admissions       2/24/2020 - 4/13/2020 Radiation    adjuvant external beam radiation therapy to the whole pelvis to 4500 cGy followed by boost to gross disease of an additional 2200 cGy     11/23/2020 Progression    New necrotic adenopathy in the retroperitoneum on CT      12/21/2020 -  Chemotherapy    Began chemotherapy with rucaparib, atezolizumab, and bevacizumab as per [facility] clinical trial        PMH:  RNYGB (2001 at [address])    Past Medical History:   Diagnosis Date    Anemia     Chemotherapy follow-up examination     Depression     Endometrial cancer ([address] ) 12/2017    Hyperglycemia     vx type 2 dm -- last assessed 4/1/14; resolved 11/7/17    Hyperlipidemia     Hypertension     Obesity     last assessed 10/14/17; resolved 11/7/17     Past Surgical History:   Procedure Laterality Date    ABDOMINAL SURGERY      GASTRIC BYPASS    CHOLECYSTECTOMY      at the time of gastric bypass    COLONOSCOPY      CT NEEDLE BIOPSY LYMPH NODE  7/8/2019    FL GUIDED CENTRAL VENOUS ACCESS DEVICE INSERTION  11/12/2019    GASTRIC BYPASS      HYSTERECTOMY Bilateral     total abdominal with salpingo-oophorectomy    IR NEPHROSTOMY TUBE PLACEMENT  7/26/2019    IR NEPHROURETERAL STENT CHECK/CHANGE/REPOSITION  4/6/2021    IR NEPHROURETERAL STENT CHECK/CHANGE/REPOSITION  6/4/2021    IR NEPHROURETERAL STENT CHECK/CHANGE/REPOSITION  9/3/2021    IR PICC LINE  9/27/2019    IR PORT PLACEMENT  7/26/2019    IR PORT REMOVAL  9/20/2019    OOPHORECTOMY Bilateral     VA INSJ TUNNELED CTR VAD W/SUBQ PORT AGE 5 YR/> Left 11/12/2019    Procedure: INSERTION VENOUS PORT ( PORT-A-CATH) IR;  Surgeon: Ned Marino DO;  [facility]: [facility];  Service: Interventional Radiology    VA LAP, RADICAL HYST W/ TUBE&OV, NODE BX N/A 12/19/2017    Procedure: HYSTERECTOMY LAPAROSCOPIC TOTAL ([address]) W/ ROBOTICS; BILATERAL SALPINGOOPHERECTOMY; LYMPH NODE DISSECTION; lysis of adhesions;  Surgeon: Sushma Luz MD;  Location: [facility];  Service: Gynecology Oncology    VA LAP,DIAGNOSTIC ABDOMEN N/A 12/19/2017    Procedure: LAPAROSCOPY DIAGNOSTIC;  Surgeon: Sushma Luz MD;  Location: [facility];  Service: Gynecology Oncology    TONSILLECTOMY      US GUIDED BREAST BIOPSY RIGHT COMPLETE Right 6/28/2019       Review of Medications:   Vitamins, Supplements and Herbals: yes: Hx RNYGB regimen: Vitamin D, Folic Acid, Align, calcium citrate TID, B12 1000 mcg QD, MVI BID (MVI does not have iron);  Iron 65 mg QD (2 hrs seperate from calcium)    Current Outpatient Medications:     acetaminophen (TYLENOL) 325 mg tablet, Take 2 tablets (650 mg total) by mouth every 6 (six) hours as needed for mild pain, headaches or fever, Disp: 30 tablet, Rfl: 0    acetaminophen-codeine (TYLENOL #3) 300-30 mg per tablet, Take 1 tablet by mouth every 6 (six) hours as needed for moderate pain (Patient not taking: Reported on 8/6/2021), Disp: 20 tablet, Rfl: 0    ARIPiprazole (ABILIFY) 10 mg tablet, Take 10 mg by mouth daily, Disp: , Rfl:     aspirin (ECOTRIN LOW STRENGTH) 81 mg EC tablet, Take 81 mg by mouth daily, Disp: , Rfl:     Calcium-Magnesium-Vitamin D (CALCIUM 500 PO), Take by mouth daily , Disp: , Rfl:     cholecalciferol (VITAMIN D3) 1,000 units tablet, Take 1,000 Units by mouth daily, Disp: , Rfl:     Cyanocobalamin (VITAMIN B12 PO), Take by mouth daily , Disp: , Rfl:     enoxaparin (LOVENOX) 100 mg/mL, Inject 1 mL (100 mg total) under the skin every 24 hours, Disp: 30 mL, Rfl: 2    folic acid (FOLVITE) 1 mg tablet, Take 1 tablet (1 mg total) by mouth daily, Disp: , Rfl: 0    gemfibrozil (LOPID) 600 mg tablet, TAKE 1 TABLET BY MOUTH EVERY DAY, Disp: 90 tablet, Rfl: 1    lactulose (CHRONULAC) 10 g/15 mL solution, TAKE 45 ML (30 G TOTAL) BY MOUTH 2 (TWO) TIMES A DAY (Patient taking differently: as needed TAKE 45 ML (30 G TOTAL) BY MOUTH 2 (TWO) TIMES A DAY), Disp: 300 mL, Rfl: 2    LORazepam (ATIVAN) 0 5 mg tablet, Take 1 tablet (0 5 mg total) by mouth every 6 (six) hours as needed for anxiety (or nausea), Disp: 36 tablet, Rfl: 1    Multiple Vitamin (MULTIVITAMIN) tablet, Take 1 tablet by mouth daily, Disp: , Rfl:     Myrbetriq 50 MG TB24, TAKE 1 TABLET BY MOUTH EVERY DAY, Disp: 30 tablet, Rfl: 5    omeprazole (PriLOSEC) 20 mg delayed release capsule, Take 20 mg by mouth daily, Disp: , Rfl:     ondansetron (ZOFRAN) 8 mg tablet, Take 1 tablet (8 mg total) by mouth every 8 (eight) hours as needed for nausea or vomiting, Disp: 30 tablet, Rfl: 1    oxybutynin (DITROPAN XL) 15 MG 24 hr tablet, Take 1 tablet (15 mg total) by mouth daily at bedtime, Disp: 90 tablet, Rfl: 3    phenazopyridine (PYRIDIUM) 200 mg tablet, Take 1 tablet (200 mg total) by mouth daily as needed for bladder spasms, Disp: 30 tablet, Rfl: 0    quinapril (ACCUPRIL) 5 mg tablet, TAKE 1 TABLET BY MOUTH EVERYDAY AT BEDTIME, Disp: 90 tablet, Rfl: 1    rucaparib (RUBRACA) 300 mg tablet, Take 600 mg by mouth every 12 (twelve) hours Take with or without food  Do not repeat a vomited dose , Disp: , Rfl:     saccharomyces boulardii (FLORASTOR) 250 mg capsule, Take 1 capsule (250 mg total) by mouth 2 (two) times a day, Disp: , Rfl: 0    Sodium Chloride Flush (Normal Saline Flush) 0 9 % SOLN, , Disp: , Rfl:     sodium chloride, PF, 0 9 %, 10 mL by Intracatheter route daily for 30 doses 10cc syringes, Disp: 300 mL, Rfl: 3    venlafaxine (EFFEXOR) 75 mg tablet, Take 75 mg by mouth 3 (three) times a day  , Disp: , Rfl:   No current facility-administered medications for this visit      Facility-Administered Medications Ordered in Other Visits:     INV atezolizumab (INVESTIGATIONAL) 1,200 mg in sodium chloride 0 9 % 250 mL infusion, 1,200 mg, Intravenous, Once, Susanna Rdedy MD    INV bevacizumab (INVESTIGATIONAL) 1,089 mg in sodium chloride 0 9 % 56 44 mL IVPB, 1,089 mg, Intravenous, Once, Susanna Reddy MD    sodium chloride 0 9 % infusion, 20 mL/hr, Intravenous, Continuous, Susanna Reddy MD    Most Recent Lab Results:  Lab Results   Component Value Date    WBC 8 35 09/17/2021    IRON 24 (L) 10/02/2019    TIBC 138 (L) 10/02/2019    FERRITIN 1,042 (H) 10/02/2019    CHOLESTEROL 171 09/17/2021    CHOL 183 10/27/2017    TRIG 88 11/01/2018    HDL 59 11/01/2018    LDLCALC 116 (H) 11/01/2018    ALT 19 09/17/2021    AST 14 09/17/2021    ALB 3 4 (L) 09/17/2021     10/27/2017     05/12/2017    SODIUM 135 (L) 09/17/2021    SODIUM 135 (L) 08/27/2021    K 3 9 09/17/2021    K 4 1 08/27/2021     09/17/2021    BUN 13 09/17/2021    BUN 16 08/27/2021    CREATININE 0 87 09/17/2021    CREATININE 1 00 08/27/2021    EGFR 71 09/17/2021    PHOS 3 6 09/17/2021    PHOS 3 7 08/27/2021    GLUCOSE 219 (H) 12/19/2017    POCGLU 97 08/18/2020    GLUF 105 (H) 04/02/2021    GLUF 108 (H) 03/12/2021    GLUC 90 09/17/2021    HGBA1C 4 8 07/07/2021    HGBA1C 5 1 02/03/2020    HGBA1C 6 2 08/28/2019    CALCIUM 9 7 09/17/2021    FOLATE 5 4 10/06/2019    MG 2 0 09/17/2021     Anthropometric Measurements:   Height: 59"  Ht Readings from Last 1 Encounters:   09/17/21 4' 11" (1 499 m)     Wt Readings from Last 20 Encounters:   09/20/21 69 2 kg (152 lb 8 oz)   09/17/21 70 5 kg (155 lb 8 oz)   09/01/21 71 2 kg (157 lb)   08/30/21 69 9 kg (154 lb)   08/27/21 72 8 kg (160 lb 9 6 oz)   08/09/21 70 8 kg (156 lb)   08/06/21 73 5 kg (162 lb)   08/04/21 73 kg (161 lb)   07/19/21 73 2 kg (161 lb 6 oz)   07/16/21 72 6 kg (160 lb)   07/07/21 73 9 kg (163 lb)   07/06/21 75 8 kg (167 lb)   06/28/21 76 kg (167 lb 8 oz)   06/25/21 77 6 kg (171 lb)   06/08/21 77 1 kg (170 lb)   06/07/21 77 1 kg (170 lb)   06/04/21 78 9 kg (174 lb)   05/26/21 78 kg (172 lb)   05/17/21 80 7 kg (178 lb)   05/14/21 81 6 kg (180 lb)     Weight Hx:  · Usual Weight: 270# before RNYGB in 2001; lowest post-op wt: 200#; wt typically fluctuates between 215-230#  · Varian: (2/25/20) 185 6#, (3/3/20) 188 6#, (3/10/20) 187 2#, (3/19/20) 186 2#, (3/24/20) 187#, (3/31/20) 185 4#, (4/10/20) 184  4#   · Home Scale Wts: (2/19/20) 185#, (8/3/20) 194# - not checking home wt    Oncology Nutrition-Anthropometrics      Nutrition from 9/20/2021 in [facility] Oncology Dietitian Services Nutrition from 8/30/2021 in [facility] Oncology Dietitian Services   Patient age (years):  61 years  63 years   Patient (female) height (in):  61 in  61 in   Current Weight to be used for anthropometric calculations (lbs)  152 5 lbs  154 lbs   Current Weight to be used for anthropometric calculations (kg)  69 3 kg  70 kg   BMI:  30 8  31 1   IBW female:  95 lbs  95 lbs   IBW (kg) female:  43 2 kg  43 2 kg   IBW % (female)  160 5 %  162 1 %   Adjusted BW (female):  109 4 lbs  109 8 lbs   Adjusted BW kg (female):  49 7 kg  49 9 kg   % weight change after 1 month:  -5 %  -4 3 %   Weight change after 1 month (lbs)  -8 1 lbs  -7 lbs   % weight change after 3 months:  (!) -10 8 %  (!) -10 5 %   Weight change after 3 months (lbs)  -18 5 lbs  -18 lbs         Nutrition-Focused Physical Findings: mild  muscle depletion ([place]) - hollowing/scooping/depression    Food/Nutrition-Related History & Client/Social History:    Current Nutrition Impact Symptoms:   [] Nausea  [x] Reduced Appetite - none, pt thinks this may be d/t constipation [] Acid Reflux   [] Vomiting  [x] Unintended Wt Loss - ongoing & significant, says she is surprised she has not lost more wt  -pt has been discussing psychological component of wt loss with her therapist   [] Malabsorption    [] Diarrhea  [] Unintended Wt Gain  [] Dumping Syndrome   [x] Constipation - worse, BM's every couple days, says she has been eating less F&V and had been drinking less water  -On bowel protocol, using Miralax daily and lactulose [] Thick Mucous/Secretions  [] Abdominal Pain    [] Dysgeusia (Altered Taste) [x] Xerostomia (Dry Mouth) - stable  -using Biotene toothpaste and mouthwash    Biotene has been effective  -Uses dry mouth lozenges prn    -tried using bs/sw rinses, says she does not need them right now [] Gas    [] Dysosmia (Altered Smell)  [] Gwendloyn Bel  [] Difficulty Chewing    [] Oral Mucositis (Sore Mouth)  [x] Fatigue - increasing [] Other:    [] Odynophagia   [] Esophagitis  [] Other:    [] Dysphagia [] Early Satiety  [] No Problems Eating      Food Allergies & Intolerances: yes: had skin patch testing which showed allergy to strawberries, but says she is able to eat strawberries without any side effects    Current Diet: Regular Diet, No Restrictions  Current Nutrition Intake: Decreased since last visit   Appetite: None, gags when forces self to eat  Nutrition Route: PO  Meal planning/preparation mainly done by: mother does cooking; sister does shopping  Activity level: Has been more active at home  Wants to start walking outside but limited by pain and medical condition  24 Hr Diet Recall: yesterday po intake was better after having a BM  Breakfast: 2% Fairlife milk with 1 cup honey nut cheerios  Snack: none  Lunch: [employer]: cup of Western Karli onion soup, side salad (ate half)  Snack: 1/2 peach  Dinner: 3 large cheese ravioli (ordered out often or has a frozen meal)  Snack: skinny pop popcorn with butter and white cheddar powder    Beverages: water (16 9 oz x4), Butch' regular unsweetened tea (32 oz x1, sometimes less), Unjury mixed with lemonade (32 oz + 1 pkt x1,2% Fairlife milk (with cereal)   -Does not drink coffee or alcohol      -Does not separate fluids from solids, does not have discomfort while eating and drinking at the same time    Supplements:    Unjury Protein Powder mixed with lemonade (32 oz + 1 pkt) - QD   o Has strawberry and unflavored powders   Has not tried Orgain protein powder samples yet   Does not like Ensure or Boost; has tried homemade milkshakes but lacks the motivation to make them    Oncology Nutrition-Estimated Needs      Nutrition from 7/19/2021 in [facility] Oncology Dietitian Services Nutrition from 8/12/2020 in [facility] Oncology Dietitian Services   Weight type used  Adjusted weight  Adjusted weight   Weight in kilograms (kg) used for estimated needs  -- 54 1 kg   Weight in kilograms (kg) used for estimated needs  50 7 kg  --   Energy needs formula:   35-40 kcal/kg  25-30 kcal/kg   Energy needs based on 25 kcal/kg:  --  1352 kcal   Energy needs based on 30 kcal/kg:  --  1623 kcal   Energy needs based on 35 kcal/k kcal  --   Energy needs based on 40 kcal/k kcal  --   Protein needs formula:  1 5-2 g/kg  1-1 2 g/kg   Protein needs based on 1 g/kg  --  54 g   Protein needs based on 1 2 g/kg:  --  65 g   Protein needs based on 1 5 g/kg  76 g  --   Protein needs based on 2 g/kg  101 g  --   Fluid needs formula:  30-35 mL/kg  25-30 mL/kg   Fluid needs based on 25 mL/kg  --  1350 mL   Fluid needs in ounces  --  46 oz   Fluid needs based on 30 mL/kg  --  1620 mL   Fluid needs in ounces  --  55 oz   Fluid needs based on 30 mL/kg  1527 mL  --   Fluid needs in ounces  52 oz  --   Fluid needs based on 35 mL/kg  1782 mL  --   Fluid needs in ounces  60 oz  --        Discussion & Intervention:    [address]  Bernadine Trujillo was evaluated today for an RD follow up regarding wt loss and pt request for dietitian involvement throughout tx  [address]  Bernadine Trujillo is currently undergoing tx for endometrial cancer  [address]  Bernadine Trujillo continues with ongoing wt loss and says today that she is surprised that she did not lose more wt  She says that she has no appetite and when she forces herself to eat, she gags  She has been more constipated and fatigued since last visit  She thinks that her constipation is affecting her appetite  She says she has been eating less fruits and vegetables as well as drinking less fluids  She is trying to get back into a regular bowel routine and has more recently been hydrating better  She plans to take her lactulose tonight  She has added 1 Unjury shake daily since last visit which is going well  She has also purchased the Unflavored Unjury protein powder which she plans to try mixed into foods and other fluids    She has not tried the [place] protein powder samples yet    Reviewed 24 hour recall, which revealed an inadequate po intake, and discussed ways to increase kcal, protein, and fluid intakes and optimize nutrient intake  Also reviewed the importance of wt management throughout the tx process and the role of a high kcal/ high protein diet in managing wt and overall health  Based on today's assessment, discussion included: MNT for: wt loss, a high kcal/protein diet & food choices to include at all meals & snacks (Examples of high kcal foods: cheese, full-fat dairy products, nut butter, plant-based fats, coconut oil/milk, avocado, butter, cream soup, etc  Examples of high protein foods: eggs, chicken, fish, beans/legumes, nuts/nut butters, bone broth, etc ) , a post RNY Gastric Bypass diet & food choices to include at all meals & snacks, fortifying foods for added kcal and protein (examples include: adding cheese to foods such as eggs, mashed potatoes, casseroles, etc ; Making oatmeal with whole milk rather than water; Making fortified mashed potatoes with cream, butter, dry milk powder, plain [place] yogurt, and cheese ), adequate hydration & fluid choices, sipping on calorie containing beverages (examples include: adding whole milk or cream to coffee, oral nutrition supplements, juice, electrolyte replacement beverages, milk, etc ), eating smaller more frequent meals every 2-3 hours (5-6 small meals/day), eating when feeling most hungry, increasing oral nutrition supplements and recipe suggestions/resources, trying new recipes, & cooking at home more often     Moving forward, Tahmina Jimenez was encouraged to increase kcal, protein, and fluid intakes  Materials Provided: not applicable  All questions and concerns addressed during todays visit  Tahmina Jimenez has RD contact information      Nutrition Diagnosis:  Inadequate Energy Intake related to physiological causes, disease state and treatment related issues as evidenced by food recall, wt loss and discussion with pt and/or family  Increased Nutrient Needs (kcal & pro) related to increased demand for nutrients and disease state as evidenced by cancer dx and pt undergoing tx for cancer  Patient has clinical indicators (or ASPEN criteria) consistent with severe protein-calorie malnutrition in the context of Chronic Illness as evidenced by >5% wt loss in 1 month, >7 5% wt loss in 3 months, </=75% energy intake vs  Estimated needs for >/=1 month and mild  muscle depletion ([place]) - hollowing/scooping/depression  Monitoring & Evaluation:   Goals:  · weight maintenance/stabilization  · adequate nutrition impact symptom management  · pt to meet >/=75% estimated nutrition needs daily   · Eat 5-6 small meals per day  · Try protein powder mixed with Fairlife milk  · Drink 1 protein shake daily    · Progress Towards Goals: Progressing and Partially Met    Nutrition Rx & Recommendations:  · Diet: High Calorie, High Protein (for high calorie foods see pages 52-53, and for high protein foods see pages 49-51 in your Eating Hints book)  · Small, frequent meals/snacks may be easier to tolerate than 3 large daily meals  Aim for 5-6 small meals per day (every 2-3 hours)  · Include protein at all meals/snacks  · For appetite loss: try powdered or liquid nutrition supplements; eating by the clock every 2-3 hours; set a timer to remind yourself to eat, keep snacks nearby; add extra protein & calories to your diet; drink liquids in between meals, choose liquids that add calories; eat a bedtime snack; eat soft, cool or frozen foods; eat a larger meal when you feel well & rested; only sip small amounts of liquids during meals (see pages 10-12 in your Eating Hints book)    · For weight loss: monitor your weight at home at least 2x/week, record your weight, start by adding 250-500 extra calories per day, eat 5-6 small scheduled meals every 2-3 hrs, choose foods that are high in protein and calories (see pages 49-53 in your Eating Hints book), drink liquids with calories for example: milkshakes/smoothies/juice/soup/whole milk/chocolate milk, cook with protein fortified milk (see recipe on page 36 in your Eating Hints book), consider ready-to-drink oral nutrition supplements such as Ensure Plus, Ensure Enlive, Boost Plus, or Boost Very High Calorie, avoid "diet" and "light" foods when possible, avoid drinking too much with meals, contact your dietitian with any continued weight loss over the course of 1 week  For more info see pages 35-37 in your Eating Hints book  · Weigh yourself regularly  If you notice weight loss, make an effort to increase your daily food/calorie intake  If you continue to notice loss after these efforts, reach out to your dietitian to establish a plan to stabilize weight  · Nutrition Supplements:  increase to 2 Unjury protein shakes daily  · Try protein powders mixed with Fairlife milk  · Try unflavored Unjury mixed into other beverages and foods as discussed  · Try Orgain protein powder samples mixed with 2% Fairlife milk  · Resume the Apple Prune Sauce + 8 oz water at bedtime to help with constipation  · Make soups with bone broth for more protein, Try Guerra's Well Yes Soups, Choose creamy soups  · Try melting shredded cheese ontop of popcorn    · Snack ideas: [place] yogurt with fresh fruit, veggies with humus, pretzels with peanut butter, grapes and cheese, fruit with a cheese stick , etc   · Lifetime vitamin/mineral supplementation recommended for Gastric Bypass Surgery:  · Multivitamin BID  · Iron 45 mg daily  · Vitamin D daily  · Calcium Citrate 500 mg TID (do not take with iron, needs to be 2 hrs apart from iron)  · B12 1000 mcg daily    Follow Up Plan: 10/11/21 during infusion   Recommend Referral to Other Providers: none at this time

## 2021-09-14 ENCOUNTER — HOSPITAL ENCOUNTER (OUTPATIENT)
Dept: NON INVASIVE DIAGNOSTICS | Facility: HOSPITAL | Age: 66
Discharge: HOME | End: 2021-09-14
Payer: COMMERCIAL

## 2021-09-14 ENCOUNTER — HOSPITAL ENCOUNTER (OUTPATIENT)
Dept: NON INVASIVE DIAGNOSTICS | Facility: HOSPITAL | Age: 66
Discharge: HOME | End: 2021-09-14
Attending: OBSTETRICS & GYNECOLOGY
Payer: COMMERCIAL

## 2021-09-14 DIAGNOSIS — C54.1 ENDOMETRIAL CANCER (HCC): ICD-10-CM

## 2021-09-14 PROCEDURE — 93308 TTE F-UP OR LMTD: CPT

## 2021-09-14 PROCEDURE — 93308 TTE F-UP OR LMTD: CPT | Performed by: INTERNAL MEDICINE

## 2021-09-14 PROCEDURE — 93321 DOPPLER ECHO F-UP/LMTD STD: CPT | Performed by: INTERNAL MEDICINE

## 2021-09-14 PROCEDURE — 93005 ELECTROCARDIOGRAM TRACING: CPT

## 2021-09-14 PROCEDURE — 93356 MYOCRD STRAIN IMG SPCKL TRCK: CPT

## 2021-09-14 PROCEDURE — 93325 DOPPLER ECHO COLOR FLOW MAPG: CPT | Performed by: INTERNAL MEDICINE

## 2021-09-15 DIAGNOSIS — C54.1 ENDOMETRIAL CANCER (HCC): Primary | ICD-10-CM

## 2021-09-15 LAB
ATRIAL RATE: 70 BPM
P AXIS: 26 DEGREES
PR INTERVAL: 146 MS
QRS AXIS: -32 DEGREES
QRSD INTERVAL: 98 MS
QT INTERVAL: 424 MS
QTC INTERVAL: 457 MS
T WAVE AXIS: 16 DEGREES
VENTRICULAR RATE: 70 BPM

## 2021-09-15 PROCEDURE — 93010 ELECTROCARDIOGRAM REPORT: CPT | Performed by: INTERNAL MEDICINE

## 2021-09-17 ENCOUNTER — HOSPITAL ENCOUNTER (OUTPATIENT)
Dept: INFUSION CENTER | Facility: HOSPITAL | Age: 66
Discharge: HOME | End: 2021-09-17
Payer: COMMERCIAL

## 2021-09-17 ENCOUNTER — OFFICE VISIT (OUTPATIENT)
Dept: GYNECOLOGIC ONCOLOGY | Facility: CLINIC | Age: 66
End: 2021-09-17
Payer: COMMERCIAL

## 2021-09-17 ENCOUNTER — TELEPHONE (OUTPATIENT)
Dept: UROLOGY | Facility: AMBULATORY SURGERY CENTER | Age: 66
End: 2021-09-17

## 2021-09-17 ENCOUNTER — DOCUMENTATION (OUTPATIENT)
Dept: OTHER | Facility: HOSPITAL | Age: 66
End: 2021-09-17

## 2021-09-17 VITALS
RESPIRATION RATE: 16 BRPM | HEART RATE: 51 BPM | OXYGEN SATURATION: 99 % | WEIGHT: 155.5 LBS | HEIGHT: 59 IN | SYSTOLIC BLOOD PRESSURE: 110 MMHG | DIASTOLIC BLOOD PRESSURE: 76 MMHG | BODY MASS INDEX: 31.35 KG/M2 | TEMPERATURE: 98.3 F

## 2021-09-17 DIAGNOSIS — C54.1 ENDOMETRIAL CANCER (HCC): Primary | Chronic | ICD-10-CM

## 2021-09-17 DIAGNOSIS — C54.1 ENDOMETRIAL CANCER (HCC): Primary | ICD-10-CM

## 2021-09-17 DIAGNOSIS — Z45.2 ENCOUNTER FOR CENTRAL LINE CARE: ICD-10-CM

## 2021-09-17 LAB
ALBUMIN SERPL BCP-MCNC: 3.4 G/DL (ref 3.5–5)
ALP SERPL-CCNC: 143 U/L (ref 46–116)
ALT SERPL W P-5'-P-CCNC: 19 U/L (ref 12–78)
AMYLASE SERPL-CCNC: 31 IU/L (ref 25–115)
ANION GAP SERPL CALCULATED.3IONS-SCNC: 8 MMOL/L (ref 4–13)
APTT PPP: 42 SECONDS (ref 23–37)
AST SERPL W P-5'-P-CCNC: 14 U/L (ref 5–45)
BACTERIA UR QL AUTO: ABNORMAL /HPF
BASOPHILS # BLD AUTO: 0.04 THOUSANDS/ΜL (ref 0–0.1)
BASOPHILS NFR BLD AUTO: 1 % (ref 0–1)
BILIRUB SERPL-MCNC: 0.2 MG/DL (ref 0.2–1)
BILIRUB UR QL STRIP: ABNORMAL
BUN SERPL-MCNC: 13 MG/DL (ref 5–25)
CALCIUM ALBUM COR SERPL-MCNC: 10.2 MG/DL (ref 8.3–10.1)
CALCIUM SERPL-MCNC: 9.7 MG/DL (ref 8.3–10.1)
CHLORIDE SERPL-SCNC: 104 MMOL/L (ref 100–108)
CHOLEST SERPL-MCNC: 171 MG/DL (ref 50–200)
CLARITY UR: ABNORMAL
CO2 SERPL-SCNC: 23 MMOL/L (ref 21–32)
COLOR UR: YELLOW
CREAT SERPL-MCNC: 0.87 MG/DL (ref 0.6–1.3)
CREAT UR-MCNC: 124 MG/DL
EOSINOPHIL # BLD AUTO: 0.06 THOUSAND/ΜL (ref 0–0.61)
EOSINOPHIL NFR BLD AUTO: 1 % (ref 0–6)
ERYTHROCYTE [DISTWIDTH] IN BLOOD BY AUTOMATED COUNT: 13.9 % (ref 11.6–15.1)
GFR SERPL CREATININE-BSD FRML MDRD: 71 ML/MIN/1.73SQ M
GGT SERPL-CCNC: 19 U/L (ref 5–85)
GLUCOSE SERPL-MCNC: 90 MG/DL (ref 65–140)
GLUCOSE UR STRIP-MCNC: NEGATIVE MG/DL
HCT VFR BLD AUTO: 32.8 % (ref 34.8–46.1)
HGB BLD-MCNC: 10.3 G/DL (ref 11.5–15.4)
HGB UR QL STRIP.AUTO: ABNORMAL
IMM GRANULOCYTES # BLD AUTO: 0.12 THOUSAND/UL (ref 0–0.2)
IMM GRANULOCYTES NFR BLD AUTO: 1 % (ref 0–2)
INR PPP: 1.01 (ref 0.84–1.19)
KETONES UR STRIP-MCNC: NEGATIVE MG/DL
LEUKOCYTE ESTERASE UR QL STRIP: ABNORMAL
LIPASE SERPL-CCNC: 132 U/L (ref 73–393)
LYMPHOCYTES # BLD AUTO: 0.77 THOUSANDS/ΜL (ref 0.6–4.47)
LYMPHOCYTES NFR BLD AUTO: 9 % (ref 14–44)
MAGNESIUM SERPL-MCNC: 2 MG/DL (ref 1.6–2.6)
MCH RBC QN AUTO: 32.3 PG (ref 26.8–34.3)
MCHC RBC AUTO-ENTMCNC: 31.4 G/DL (ref 31.4–37.4)
MCV RBC AUTO: 103 FL (ref 82–98)
MONOCYTES # BLD AUTO: 0.73 THOUSAND/ΜL (ref 0.17–1.22)
MONOCYTES NFR BLD AUTO: 9 % (ref 4–12)
NEUTROPHILS # BLD AUTO: 6.63 THOUSANDS/ΜL (ref 1.85–7.62)
NEUTS SEG NFR BLD AUTO: 79 % (ref 43–75)
NITRITE UR QL STRIP: NEGATIVE
NON-SQ EPI CELLS URNS QL MICRO: ABNORMAL /HPF
NRBC BLD AUTO-RTO: 0 /100 WBCS
PH UR STRIP.AUTO: 6 [PH]
PHOSPHATE SERPL-MCNC: 3.6 MG/DL (ref 2.3–4.1)
PLATELET # BLD AUTO: 248 THOUSANDS/UL (ref 149–390)
PMV BLD AUTO: 10.4 FL (ref 8.9–12.7)
POTASSIUM SERPL-SCNC: 3.9 MMOL/L (ref 3.5–5.3)
PROT SERPL-MCNC: 7.8 G/DL (ref 6.4–8.2)
PROT UR STRIP-MCNC: ABNORMAL MG/DL
PROT UR-MCNC: 214 MG/DL
PROT/CREAT UR: 1.73 MG/G{CREAT} (ref 0–0.1)
PROTHROMBIN TIME: 12.9 SECONDS (ref 11.6–14.5)
RBC # BLD AUTO: 3.19 MILLION/UL (ref 3.81–5.12)
RBC #/AREA URNS AUTO: ABNORMAL /HPF
SODIUM SERPL-SCNC: 135 MMOL/L (ref 136–145)
SP GR UR STRIP.AUTO: 1.02 (ref 1–1.03)
T3 SERPL-MCNC: 1 NG/ML (ref 0.6–1.8)
T4 FREE SERPL-MCNC: 1.07 NG/DL (ref 0.76–1.46)
TSH SERPL DL<=0.05 MIU/L-ACNC: 0.95 UIU/ML (ref 0.36–3.74)
UROBILINOGEN UR QL STRIP.AUTO: 1 E.U./DL
WBC # BLD AUTO: 8.35 THOUSAND/UL (ref 4.31–10.16)
WBC #/AREA URNS AUTO: ABNORMAL /HPF

## 2021-09-17 PROCEDURE — 83735 ASSAY OF MAGNESIUM: CPT

## 2021-09-17 PROCEDURE — 3008F BODY MASS INDEX DOCD: CPT | Performed by: PHYSICIAN ASSISTANT

## 2021-09-17 PROCEDURE — 82465 ASSAY BLD/SERUM CHOLESTEROL: CPT

## 2021-09-17 PROCEDURE — 85730 THROMBOPLASTIN TIME PARTIAL: CPT

## 2021-09-17 PROCEDURE — 80053 COMPREHEN METABOLIC PANEL: CPT

## 2021-09-17 PROCEDURE — 99214 OFFICE O/P EST MOD 30 MIN: CPT | Performed by: PHYSICIAN ASSISTANT

## 2021-09-17 PROCEDURE — 81001 URINALYSIS AUTO W/SCOPE: CPT

## 2021-09-17 PROCEDURE — 82570 ASSAY OF URINE CREATININE: CPT

## 2021-09-17 PROCEDURE — 1036F TOBACCO NON-USER: CPT | Performed by: PHYSICIAN ASSISTANT

## 2021-09-17 PROCEDURE — 83690 ASSAY OF LIPASE: CPT

## 2021-09-17 PROCEDURE — 82977 ASSAY OF GGT: CPT

## 2021-09-17 PROCEDURE — 84100 ASSAY OF PHOSPHORUS: CPT

## 2021-09-17 PROCEDURE — 85025 COMPLETE CBC W/AUTO DIFF WBC: CPT

## 2021-09-17 PROCEDURE — 84156 ASSAY OF PROTEIN URINE: CPT

## 2021-09-17 PROCEDURE — 84439 ASSAY OF FREE THYROXINE: CPT

## 2021-09-17 PROCEDURE — 85610 PROTHROMBIN TIME: CPT

## 2021-09-17 PROCEDURE — 82150 ASSAY OF AMYLASE: CPT

## 2021-09-17 PROCEDURE — 84443 ASSAY THYROID STIM HORMONE: CPT

## 2021-09-17 PROCEDURE — 84480 ASSAY TRIIODOTHYRONINE (T3): CPT

## 2021-09-17 NOTE — PROGRESS NOTES
Labs have been reviewed and signed by Dr Nikkie Zapata on 9/17/21  Per protocol, patient is ok to proceed with Cycle 14 Day 1 treatment on Monday 9/20/21

## 2021-09-17 NOTE — PROGRESS NOTES
Patient was seen in the office for her Cycle 14 Day 1 pre treatment office visit [facility]  with Aicha Tapia  Patient AEs and Conmeds were reviewed  Patient denied changes to her conmeds, but reported that she is experiencing some fatigue this week as well as decreased appetite and constipation this week    Future appointments were reviewed  Overall, patient states that she is feeling okay, and was told to call with any questions or concerns

## 2021-09-17 NOTE — TELEPHONE ENCOUNTER
Received a message from PCP regarding results of recent urinalysis  Findings show presence of bacteria, otherwise negative nitrite and RBC  Patient has history of recurrent urinary tract infections  Please call patient to see if she is experiencing any signs of acute infection

## 2021-09-18 ENCOUNTER — IMMUNIZATIONS (OUTPATIENT)
Dept: FAMILY MEDICINE CLINIC | Facility: HOSPITAL | Age: 66
End: 2021-09-18

## 2021-09-18 DIAGNOSIS — Z23 ENCOUNTER FOR IMMUNIZATION: Primary | ICD-10-CM

## 2021-09-18 DIAGNOSIS — I82.4Y1 ACUTE DEEP VEIN THROMBOSIS (DVT) OF PROXIMAL VEIN OF RIGHT LOWER EXTREMITY (HCC): ICD-10-CM

## 2021-09-18 PROCEDURE — 91300 SARS-COV-2 / COVID-19 MRNA VACCINE (PFIZER-BIONTECH) 30 MCG: CPT

## 2021-09-18 PROCEDURE — 0001A SARS-COV-2 / COVID-19 MRNA VACCINE (PFIZER-BIONTECH) 30 MCG: CPT

## 2021-09-20 ENCOUNTER — NUTRITION (OUTPATIENT)
Dept: NUTRITION | Facility: CLINIC | Age: 66
End: 2021-09-20

## 2021-09-20 ENCOUNTER — HOSPITAL ENCOUNTER (OUTPATIENT)
Dept: INFUSION CENTER | Facility: CLINIC | Age: 66
Discharge: HOME | End: 2021-09-20
Payer: COMMERCIAL

## 2021-09-20 ENCOUNTER — DOCUMENTATION (OUTPATIENT)
Dept: OTHER | Facility: HOSPITAL | Age: 66
End: 2021-09-20

## 2021-09-20 VITALS
DIASTOLIC BLOOD PRESSURE: 76 MMHG | BODY MASS INDEX: 30.74 KG/M2 | WEIGHT: 152.5 LBS | HEART RATE: 68 BPM | OXYGEN SATURATION: 95 % | SYSTOLIC BLOOD PRESSURE: 110 MMHG | TEMPERATURE: 97.7 F | HEIGHT: 59 IN | RESPIRATION RATE: 16 BRPM

## 2021-09-20 DIAGNOSIS — Z71.3 NUTRITIONAL COUNSELING: Primary | ICD-10-CM

## 2021-09-20 PROCEDURE — 96413 CHEMO IV INFUSION 1 HR: CPT

## 2021-09-20 PROCEDURE — J9035Q0 INV BEVACIZUMAB 400MG/16ML 16 ML: Performed by: OBSTETRICS & GYNECOLOGY

## 2021-09-20 PROCEDURE — J9999Q0 INV ATEZOLIZUMAB 1200MG/20ML 20 ML: Performed by: OBSTETRICS & GYNECOLOGY

## 2021-09-20 PROCEDURE — 96417 CHEMO IV INFUS EACH ADDL SEQ: CPT

## 2021-09-20 RX ORDER — SODIUM CHLORIDE 9 MG/ML
20 INJECTION, SOLUTION INTRAVENOUS CONTINUOUS
Status: DISCONTINUED | OUTPATIENT
Start: 2021-09-20 | End: 2021-09-23 | Stop reason: HOSPADM

## 2021-09-20 RX ADMIN — SODIUM CHLORIDE 20 ML/HR: 0.9 INJECTION, SOLUTION INTRAVENOUS at 08:26

## 2021-09-20 RX ADMIN — Medication 1200 MG: at 09:03

## 2021-09-20 RX ADMIN — Medication 1089 MG: at 09:54

## 2021-09-20 NOTE — PROGRESS NOTES
Patient tolerated treatment today without incident  Port remains with excellent blood return, port flushed well  Port deaccessed as per hospital policy, bandaid in place  Patient aware of next appts, declines AVS  Patient AAox4 and ambulatory upon DC without gait disturbance noted, cane utilized

## 2021-09-20 NOTE — PROGRESS NOTES
Patient arrives to infusion center for clinical trials (atezolizumab and bevacizumab)  Patient offers no acute complaints  Arsh Mccabe RN from clinical trial at patient chairside, OK to treat today  Labs reviewed from 9/17/21 - noted that urinalysis shows large amount leuks, moderate bacteria, negative nitrites  Patient denies any UTI complaints - reports she saw infectious disease Dr at beginning of September and was prescribed amoxicillin, which is now complete  9/17/21 note from [place] shows urology office is aware of urinalysis results  VSS, port accessed without difficulty, excellent blood return noted, port flushed well  Patient resting on recliner chair, call bell within reach

## 2021-09-20 NOTE — PROGRESS NOTES
Patient was seen in [address] [place] [address] for Cycle 14 Day 1 treatment  Patient was given Cycle 14 Rucaparib pills and drug diary, and patient returned Cycle 13 study pills, and diary   Patient denied any changes to AE'S and conmeds reviewed      Patient was told to call with any questions or concerns, Patient tolerates treatment without incident

## 2021-09-20 NOTE — ASSESSMENT & PLAN NOTE
Recurrent stage IB endometrial cancer currently receiving treatment on ENDOBARR clinical trial  She is tolerating treatment well  Her treatment and malignancy related symptoms are well controlled  She has treatment related fatigue       Continue with cycle 14 of treatment  Labs from 9/17/21 pending       Plan for repeat imaging per clinical trial protocol  Return to the office as scheduled

## 2021-09-20 NOTE — PROGRESS NOTES
Assessment/Plan:    Problem List Items Addressed This Visit        Genitourinary    Endometrial cancer ([facility] [address] ) - Primary (Chronic)     Recurrent stage IB endometrial cancer currently receiving treatment on ENDOBARR clinical trial  She is tolerating treatment well  Her treatment and malignancy related symptoms are well controlled  She has treatment related fatigue       Continue with cycle 14 of treatment  Labs from 9/17/21 pending       Plan for repeat imaging per clinical trial protocol  Return to the office as scheduled  CHIEF COMPLAINT:   Pre-treatment evaluation while on clinical trial     Problem:  Cancer Staging  Endometrial cancer [facility])  Staging form: Corpus Uteri - Carcinoma, AJCC 7th Edition  - Clinical stage from 12/19/2017: FIGO Stage IB (T1b, N0, M0) - Signed by Gaby Rodarte MD on 2/12/2018        Previous therapy:  Oncology History   [facility] ([facility] )   11/17/2017 Initial Diagnosis    Endometrial cancer ([facility] )     11/17/2017 Biopsy    ENDOMETRIAL BIOPSY: WELL DIFFERENTIATED ENDOMETRIAL ADENOCARCINOMA (FIGO I) WITH FOCALMUCINOUS FEATURES  Part B: ENDOCERVICAL POLYP:BENIGN ENDOCERVICAL      12/19/2017 Surgery    Robotic assisted total laparoscopic hysterectomy with bilateral salpingo-oophorectomy and sentinel bilateral pelvic lymph node dissection  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 (4 4 x 3 2 cm tumor, 9 4/15 4mm invasion, NO LVSI, washings revealed atypical cellular changes)     12/19/2017 Genetic Testing    Morrison testing negative     6/28/2019 Biopsy    A  Breast, Right, US BX Right Breast 1000 4 cmfn:  - Benign breast tissue with focal histiocytic aggregate  See comment   - Negative for atypia and in-situ or invasive carcinoma  7/8/2019 Recurrence    Presented with right lower extremity DVT and CT demonstrating right pelvic sidewall mass with venous, ureteral and nerve compression causing significant neuropathic pain        Biopsy:  Lymph Node, Right pelvic lymph node x3:  - High-grade adenocarcinoma  7/30/2019 - 1/6/2020 Chemotherapy    Taxol 175 mg/m2 and carboplatin AUC 6 every 21 days  Dose was reduced to taxol 135 mg/m2 and carboplatin AUC 5  Completed 6 cycles  Treatment protracted due to multiple hospital admissions  2/24/2020 - 4/13/2020 Radiation    adjuvant external beam radiation therapy to the whole pelvis to 4500 cGy followed by boost to gross disease of an additional 2200 cGy     11/23/2020 Progression    New necrotic adenopathy in the retroperitoneum on CT      12/21/2020 -  Chemotherapy    Began chemotherapy with rucaparib, atezolizumab, and bevacizumab as per [facility] clinical trial            Patient ID: Vpiin Schuster is a 61 y o  female  who presents to the office for pre-treatment evaluation on ENDOBARR trial  Overall, she is tolerating treatment well without new symptoms  The patient notes stable fatigue  She has been afebrile  Her appetite is decreased  She is supplementing with protein powder, and continues to meet with nutrition team  The patient denies n/v/abdominal pain  Normal bowel/bladder function  She is following with ID for chronic UTIs  She notes significant improvement in her urinary symptoms  Labs from today are pending  Labs from 8/27/21 reviewed  The patient denies headaches, nose bleeds, new SOB, cough or bloody diarrhea  The following portions of the patient's history were reviewed and updated as appropriate: allergies, current medications, past medical history, past surgical history and problem list     Review of Systems   Constitutional: Positive for appetite change and fatigue  Negative for fever  HENT: Negative  Eyes: Negative  Respiratory: Negative  Cardiovascular: Negative  Gastrointestinal: Negative  Genitourinary: Negative  Musculoskeletal: Negative  Skin: Negative  Neurological: Negative  Psychiatric/Behavioral: Negative          Current Outpatient Medications Medication Sig Dispense Refill    acetaminophen (TYLENOL) 325 mg tablet Take 2 tablets (650 mg total) by mouth every 6 (six) hours as needed for mild pain, headaches or fever 30 tablet 0    ARIPiprazole (ABILIFY) 10 mg tablet Take 10 mg by mouth daily      aspirin (ECOTRIN LOW STRENGTH) 81 mg EC tablet Take 81 mg by mouth daily      Calcium-Magnesium-Vitamin D (CALCIUM 500 PO) Take by mouth daily       cholecalciferol (VITAMIN D3) 1,000 units tablet Take 1,000 Units by mouth daily      Cyanocobalamin (VITAMIN B12 PO) Take by mouth daily       enoxaparin (LOVENOX) 100 mg/mL Inject 1 mL (100 mg total) under the skin every 24 hours 30 mL 2    folic acid (FOLVITE) 1 mg tablet Take 1 tablet (1 mg total) by mouth daily  0    gemfibrozil (LOPID) 600 mg tablet TAKE 1 TABLET BY MOUTH EVERY DAY 90 tablet 1    lactulose (CHRONULAC) 10 g/15 mL solution TAKE 45 ML (30 G TOTAL) BY MOUTH 2 (TWO) TIMES A DAY (Patient taking differently: as needed TAKE 45 ML (30 G TOTAL) BY MOUTH 2 (TWO) TIMES A DAY) 300 mL 2    LORazepam (ATIVAN) 0 5 mg tablet Take 1 tablet (0 5 mg total) by mouth every 6 (six) hours as needed for anxiety (or nausea) 36 tablet 1    Multiple Vitamin (MULTIVITAMIN) tablet Take 1 tablet by mouth daily      Myrbetriq 50 MG TB24 TAKE 1 TABLET BY MOUTH EVERY DAY 30 tablet 5    omeprazole (PriLOSEC) 20 mg delayed release capsule Take 20 mg by mouth daily      ondansetron (ZOFRAN) 8 mg tablet Take 1 tablet (8 mg total) by mouth every 8 (eight) hours as needed for nausea or vomiting 30 tablet 1    oxybutynin (DITROPAN XL) 15 MG 24 hr tablet Take 1 tablet (15 mg total) by mouth daily at bedtime 90 tablet 3    phenazopyridine (PYRIDIUM) 200 mg tablet Take 1 tablet (200 mg total) by mouth daily as needed for bladder spasms 30 tablet 0    quinapril (ACCUPRIL) 5 mg tablet TAKE 1 TABLET BY MOUTH EVERYDAY AT BEDTIME 90 tablet 1    rucaparib (RUBRACA) 300 mg tablet Take 600 mg by mouth every 12 (twelve) hours Take with or without food  Do not repeat a vomited dose   saccharomyces boulardii (FLORASTOR) 250 mg capsule Take 1 capsule (250 mg total) by mouth 2 (two) times a day  0    venlafaxine (EFFEXOR) 75 mg tablet Take 75 mg by mouth 3 (three) times a day        acetaminophen-codeine (TYLENOL #3) 300-30 mg per tablet Take 1 tablet by mouth every 6 (six) hours as needed for moderate pain (Patient not taking: Reported on 8/6/2021) 20 tablet 0    Sodium Chloride Flush (Normal Saline Flush) 0 9 % SOLN  (Patient not taking: Reported on 9/1/2021)      sodium chloride, PF, 0 9 % 10 mL by Intracatheter route daily for 30 doses 10cc syringes 300 mL 3     No current facility-administered medications for this visit  Facility-Administered Medications Ordered in Other Visits   Medication Dose Route Frequency Provider Last Rate Last Admin    INV atezolizumab (INVESTIGATIONAL) 1,200 mg in sodium chloride 0 9 % 250 mL infusion  1,200 mg Intravenous Once Fouzia Luoie MD        INV bevacizumab (INVESTIGATIONAL) 1,089 mg in sodium chloride 0 9 % 56 44 mL IVPB  1,089 mg Intravenous Once Fouzia Louie MD        sodium chloride 0 9 % infusion  20 mL/hr Intravenous Continuous Fouzia Louie MD               Objective:    Blood pressure 110/76, pulse (!) 51, temperature 98 3 °F (36 8 °C), temperature source Temporal, resp  rate 16, height 4' 11" (1 499 m), weight 70 5 kg (155 lb 8 oz), SpO2 99 %, not currently breastfeeding  Body mass index is 31 41 kg/m²  Body surface area is 1 66 meters squared  Physical Exam  Vitals reviewed  Constitutional:       General: She is not in acute distress  Appearance: Normal appearance  She is not ill-appearing  HENT:      Head: Normocephalic and atraumatic  Mouth/Throat:      Mouth: Mucous membranes are moist    Eyes:      General: No scleral icterus  Right eye: No discharge  Left eye: No discharge        Conjunctiva/sclera: Conjunctivae normal  Pulmonary:      Effort: Pulmonary effort is normal    Musculoskeletal:      Right lower leg: No edema  Left lower leg: No edema  Skin:     General: Skin is warm and dry  Coloration: Skin is not jaundiced  Findings: No rash  Neurological:      General: No focal deficit present  Mental Status: She is alert and oriented to person, place, and time  Cranial Nerves: No cranial nerve deficit  Sensory: No sensory deficit  Motor: No weakness  Gait: Gait normal    Psychiatric:         Mood and Affect: Mood normal          Behavior: Behavior normal          Thought Content: Thought content normal          Judgment: Judgment normal      Performance status is one

## 2021-09-20 NOTE — PATIENT INSTRUCTIONS
Nutrition Rx & Recommendations:  · Diet: High Calorie, High Protein (for high calorie foods see pages 52-53, and for high protein foods see pages 49-51 in your Eating Hints book)  · Small, frequent meals/snacks may be easier to tolerate than 3 large daily meals  Aim for 5-6 small meals per day (every 2-3 hours)  · Include protein at all meals/snacks  · For appetite loss: try powdered or liquid nutrition supplements; eating by the clock every 2-3 hours; set a timer to remind yourself to eat, keep snacks nearby; add extra protein & calories to your diet; drink liquids in between meals, choose liquids that add calories; eat a bedtime snack; eat soft, cool or frozen foods; eat a larger meal when you feel well & rested; only sip small amounts of liquids during meals (see pages 10-12 in your Eating Hints book)  · For weight loss: monitor your weight at home at least 2x/week, record your weight, start by adding 250-500 extra calories per day, eat 5-6 small scheduled meals every 2-3 hrs, choose foods that are high in protein and calories (see pages 49-53 in your Eating Hints book), drink liquids with calories for example: milkshakes/smoothies/juice/soup/whole milk/chocolate milk, cook with protein fortified milk (see recipe on page 36 in your Eating Hints book), consider ready-to-drink oral nutrition supplements such as Ensure Plus, Ensure Enlive, Boost Plus, or Boost Very High Calorie, avoid "diet" and "light" foods when possible, avoid drinking too much with meals, contact your dietitian with any continued weight loss over the course of 1 week  For more info see pages 35-37 in your Eating Hints book  · Weigh yourself regularly  If you notice weight loss, make an effort to increase your daily food/calorie intake  If you continue to notice loss after these efforts, reach out to your dietitian to establish a plan to stabilize weight     · Nutrition Supplements:  increase to 2 Unjury protein shakes daily  · Try protein powders mixed with Fairlife milk  · Try unflavored Unjury mixed into other beverages and foods as discussed  · Try Orgain protein powder samples mixed with 2% Fairlife milk  · Resume the Apple Prune Sauce + 8 oz water at bedtime to help with constipation  · Make soups with bone broth for more protein, Try Guerra's Well Yes Soups, Choose creamy soups  · Try melting shredded cheese ontop of popcorn    · Snack ideas: [place] yogurt with fresh fruit, veggies with humus, pretzels with peanut butter, grapes and cheese, fruit with a cheese stick , etc   · Lifetime vitamin/mineral supplementation recommended for Gastric Bypass Surgery:  · Multivitamin BID  · Iron 45 mg daily  · Vitamin D daily  · Calcium Citrate 500 mg TID (do not take with iron, needs to be 2 hrs apart from iron)  · B12 1000 mcg daily    Follow Up Plan: 10/11/21 during infusion   Recommend Referral to Other Providers: none at this time

## 2021-09-20 NOTE — TELEPHONE ENCOUNTER
Spoke to patient and she states she does not have any symptoms  She states one of her doctors put her on Amoxicillin and it seemed to clear up  Advised to call the office with any questions/concerns  She is agreeable

## 2021-09-23 DIAGNOSIS — N39.46 MIXED STRESS AND URGE URINARY INCONTINENCE: ICD-10-CM

## 2021-09-23 DIAGNOSIS — E78.5 DYSLIPIDEMIA: ICD-10-CM

## 2021-09-23 RX ORDER — OXYBUTYNIN CHLORIDE 15 MG/1
TABLET, EXTENDED RELEASE ORAL
Qty: 90 TABLET | Refills: 3 | Status: SHIPPED | OUTPATIENT
Start: 2021-09-23

## 2021-09-23 RX ORDER — GEMFIBROZIL 600 MG/1
TABLET, FILM COATED ORAL
Qty: 90 TABLET | Refills: 1 | Status: SHIPPED | OUTPATIENT
Start: 2021-09-23 | End: 2022-04-01

## 2021-09-27 ENCOUNTER — TELEPHONE (OUTPATIENT)
Dept: INFECTIOUS DISEASES | Facility: CLINIC | Age: 66
End: 2021-09-27

## 2021-09-27 ENCOUNTER — NURSE TRIAGE (OUTPATIENT)
Dept: OTHER | Facility: OTHER | Age: 66
End: 2021-09-27

## 2021-09-27 NOTE — TELEPHONE ENCOUNTER
Reason for Disposition   Age > 50 years    Additional Information   Discomfort (pain, burning or stinging) when passing urine and female    Answer Assessment - Initial Assessment Questions  1  SYMPTOM: "What's the main symptom you're concerned about?" (e g , frequency, incontinence)      Burning with urination  Denied other symptoms  2  ONSET: "When did the  symptoms  start?"      Started 9/25/2021  3  PAIN: "Is there any pain?" If so, ask: "How bad is it?" (Scale: 1-10; mild, moderate, severe)      Urethral burning 6-7/10  4  CAUSE: "What do you think is causing the symptoms?"      UTI  5  OTHER SYMPTOMS: "Do you have any other symptoms?" (e g , fever, flank pain, blood in urine, pain with urination)      Denied foul smelling urine, hematuria, bladder pain or flank pain  6   PREGNANCY: "Is there any chance you are pregnant?" "When was your last menstrual period?"      N/A    Protocols used: URINATION PAIN - FEMALE-ADULT-[place], URINARY SYMPTOMS-ADULT-[place]

## 2021-09-27 NOTE — TELEPHONE ENCOUNTER
Pt called in because she states she is having burning all the time while voiding and not  She denies any fever, no foul smell  She is not sure why this is happening and states the amoxicillin helped her out so well  Spoke with Dr Alpa Anderson  Per Dr Alpa Anderson she has received extended course of Abx, symptoms still persist/reoccur, doesn't believe it is a UTI any further antibiotic will not be effective  Called pt and made her aware of above  She stated she will contact [facility] regarding what she can use for burning

## 2021-09-28 ENCOUNTER — APPOINTMENT (OUTPATIENT)
Dept: LAB | Facility: CLINIC | Age: 66
End: 2021-09-28
Payer: COMMERCIAL

## 2021-09-28 DIAGNOSIS — C54.1 ENDOMETRIAL CANCER (HCC): ICD-10-CM

## 2021-09-28 LAB
BACTERIA UR QL AUTO: ABNORMAL /HPF
BILIRUB UR QL STRIP: NEGATIVE
CLARITY UR: CLEAR
COLOR UR: YELLOW
CREAT UR-MCNC: <13 MG/DL
GLUCOSE UR STRIP-MCNC: NEGATIVE MG/DL
HGB UR QL STRIP.AUTO: NEGATIVE
HYALINE CASTS #/AREA URNS LPF: ABNORMAL /LPF
KETONES UR STRIP-MCNC: NEGATIVE MG/DL
LEUKOCYTE ESTERASE UR QL STRIP: ABNORMAL
NITRITE UR QL STRIP: NEGATIVE
NON-SQ EPI CELLS URNS QL MICRO: ABNORMAL /HPF
PH UR STRIP.AUTO: 6.5 [PH]
PROT UR STRIP-MCNC: NEGATIVE MG/DL
PROT UR-MCNC: <6 MG/DL
RBC #/AREA URNS AUTO: ABNORMAL /HPF
SP GR UR STRIP.AUTO: 1 (ref 1–1.03)
UROBILINOGEN UR QL STRIP.AUTO: 0.2 E.U./DL
WBC #/AREA URNS AUTO: ABNORMAL /HPF

## 2021-09-28 PROCEDURE — 82570 ASSAY OF URINE CREATININE: CPT

## 2021-09-28 PROCEDURE — 81001 URINALYSIS AUTO W/SCOPE: CPT

## 2021-09-28 PROCEDURE — 84156 ASSAY OF PROTEIN URINE: CPT

## 2021-09-29 ENCOUNTER — TELEPHONE (OUTPATIENT)
Dept: HEMATOLOGY ONCOLOGY | Facility: CLINIC | Age: 66
End: 2021-09-29

## 2021-09-29 NOTE — TELEPHONE ENCOUNTER
Prior authorization number for generic Lovenox is [id number]  Reportedly valid through 9/2022 pending any plan changes  Communicated to patient's pharmacy

## 2021-09-29 NOTE — TELEPHONE ENCOUNTER
Patient stated her insurance company needs to get authorization #  for her medication: enoxaparin (LOVENOX), insurance phone number is: [phone]  Patient best call back # is: [phone]

## 2021-10-04 ENCOUNTER — HOSPITAL ENCOUNTER (OUTPATIENT)
Dept: RADIOLOGY | Facility: HOSPITAL | Age: 66
Discharge: HOME | End: 2021-10-04
Attending: OBSTETRICS & GYNECOLOGY
Payer: COMMERCIAL

## 2021-10-04 DIAGNOSIS — C54.1 ENDOMETRIAL CANCER (HCC): ICD-10-CM

## 2021-10-04 DIAGNOSIS — C54.1 ENDOMETRIAL CANCER (HCC): Primary | ICD-10-CM

## 2021-10-04 PROCEDURE — 71260 CT THORAX DX C+: CPT

## 2021-10-04 PROCEDURE — 74177 CT ABD & PELVIS W/CONTRAST: CPT

## 2021-10-04 PROCEDURE — G1004 CDSM NDSC: HCPCS

## 2021-10-04 RX ADMIN — IOHEXOL 100 ML: 350 INJECTION, SOLUTION INTRAVENOUS at 09:10

## 2021-10-05 DIAGNOSIS — C54.1 ENDOMETRIAL CANCER (HCC): Primary | ICD-10-CM

## 2021-10-06 ENCOUNTER — DOCUMENTATION (OUTPATIENT)
Dept: OTHER | Facility: HOSPITAL | Age: 66
End: 2021-10-06

## 2021-10-06 ENCOUNTER — HOSPITAL ENCOUNTER (OUTPATIENT)
Dept: INFUSION CENTER | Facility: HOSPITAL | Age: 66
Discharge: HOME | End: 2021-10-06
Payer: COMMERCIAL

## 2021-10-06 ENCOUNTER — OFFICE VISIT (OUTPATIENT)
Dept: GYNECOLOGIC ONCOLOGY | Facility: CLINIC | Age: 66
End: 2021-10-06
Payer: COMMERCIAL

## 2021-10-06 VITALS
BODY MASS INDEX: 29.43 KG/M2 | OXYGEN SATURATION: 100 % | HEIGHT: 59 IN | DIASTOLIC BLOOD PRESSURE: 86 MMHG | HEART RATE: 51 BPM | SYSTOLIC BLOOD PRESSURE: 122 MMHG | WEIGHT: 146 LBS | RESPIRATION RATE: 16 BRPM | TEMPERATURE: 97.8 F

## 2021-10-06 DIAGNOSIS — Z45.2 ENCOUNTER FOR CENTRAL LINE CARE: ICD-10-CM

## 2021-10-06 DIAGNOSIS — I82.4Y1 ACUTE DEEP VEIN THROMBOSIS (DVT) OF PROXIMAL VEIN OF RIGHT LOWER EXTREMITY (HCC): ICD-10-CM

## 2021-10-06 DIAGNOSIS — R64 CACHEXIA (HCC): Primary | ICD-10-CM

## 2021-10-06 DIAGNOSIS — C79.89 SECONDARY MALIGNANT NEOPLASM OF OTHER SPECIFIED SITES (HCC): ICD-10-CM

## 2021-10-06 DIAGNOSIS — C54.1 ENDOMETRIAL CANCER (HCC): Chronic | ICD-10-CM

## 2021-10-06 DIAGNOSIS — N13.5 OBSTRUCTION OF RIGHT URETER: ICD-10-CM

## 2021-10-06 DIAGNOSIS — C54.1 ENDOMETRIAL CANCER (HCC): Primary | ICD-10-CM

## 2021-10-06 LAB
ALBUMIN SERPL BCP-MCNC: 3.6 G/DL (ref 3.5–5)
ALP SERPL-CCNC: 163 U/L (ref 46–116)
ALT SERPL W P-5'-P-CCNC: 28 U/L (ref 12–78)
AMYLASE SERPL-CCNC: 29 IU/L (ref 25–115)
ANION GAP SERPL CALCULATED.3IONS-SCNC: 7 MMOL/L (ref 4–13)
APTT PPP: 38 SECONDS (ref 23–37)
AST SERPL W P-5'-P-CCNC: 23 U/L (ref 5–45)
BASOPHILS # BLD AUTO: 0.04 THOUSANDS/ΜL (ref 0–0.1)
BASOPHILS NFR BLD AUTO: 1 % (ref 0–1)
BILIRUB SERPL-MCNC: 0.37 MG/DL (ref 0.2–1)
BUN SERPL-MCNC: 17 MG/DL (ref 5–25)
CALCIUM SERPL-MCNC: 10 MG/DL (ref 8.3–10.1)
CHLORIDE SERPL-SCNC: 103 MMOL/L (ref 100–108)
CHOLEST SERPL-MCNC: 186 MG/DL (ref 50–200)
CO2 SERPL-SCNC: 24 MMOL/L (ref 21–32)
CREAT SERPL-MCNC: 0.94 MG/DL (ref 0.6–1.3)
EOSINOPHIL # BLD AUTO: 0.04 THOUSAND/ΜL (ref 0–0.61)
EOSINOPHIL NFR BLD AUTO: 1 % (ref 0–6)
ERYTHROCYTE [DISTWIDTH] IN BLOOD BY AUTOMATED COUNT: 13.4 % (ref 11.6–15.1)
GFR SERPL CREATININE-BSD FRML MDRD: 64 ML/MIN/1.73SQ M
GGT SERPL-CCNC: 12 U/L (ref 5–85)
GLUCOSE SERPL-MCNC: 85 MG/DL (ref 65–140)
HCT VFR BLD AUTO: 31.5 % (ref 34.8–46.1)
HGB BLD-MCNC: 10.1 G/DL (ref 11.5–15.4)
IMM GRANULOCYTES # BLD AUTO: 0.08 THOUSAND/UL (ref 0–0.2)
IMM GRANULOCYTES NFR BLD AUTO: 1 % (ref 0–2)
INR PPP: 1.05 (ref 0.84–1.19)
LIPASE SERPL-CCNC: 100 U/L (ref 73–393)
LYMPHOCYTES # BLD AUTO: 0.68 THOUSANDS/ΜL (ref 0.6–4.47)
LYMPHOCYTES NFR BLD AUTO: 8 % (ref 14–44)
MAGNESIUM SERPL-MCNC: 2 MG/DL (ref 1.6–2.6)
MCH RBC QN AUTO: 32.8 PG (ref 26.8–34.3)
MCHC RBC AUTO-ENTMCNC: 32.1 G/DL (ref 31.4–37.4)
MCV RBC AUTO: 102 FL (ref 82–98)
MONOCYTES # BLD AUTO: 0.71 THOUSAND/ΜL (ref 0.17–1.22)
MONOCYTES NFR BLD AUTO: 9 % (ref 4–12)
NEUTROPHILS # BLD AUTO: 6.5 THOUSANDS/ΜL (ref 1.85–7.62)
NEUTS SEG NFR BLD AUTO: 80 % (ref 43–75)
NRBC BLD AUTO-RTO: 0 /100 WBCS
PHOSPHATE SERPL-MCNC: 4.2 MG/DL (ref 2.3–4.1)
PLATELET # BLD AUTO: 277 THOUSANDS/UL (ref 149–390)
PMV BLD AUTO: 10.3 FL (ref 8.9–12.7)
POTASSIUM SERPL-SCNC: 4.1 MMOL/L (ref 3.5–5.3)
PROT SERPL-MCNC: 7.8 G/DL (ref 6.4–8.2)
PROTHROMBIN TIME: 13.3 SECONDS (ref 11.6–14.5)
RBC # BLD AUTO: 3.08 MILLION/UL (ref 3.81–5.12)
SODIUM SERPL-SCNC: 134 MMOL/L (ref 136–145)
T3 SERPL-MCNC: 1.2 NG/ML (ref 0.6–1.8)
T4 FREE SERPL-MCNC: 1.17 NG/DL (ref 0.76–1.46)
TSH SERPL DL<=0.05 MIU/L-ACNC: 1.6 UIU/ML (ref 0.36–3.74)
WBC # BLD AUTO: 8.05 THOUSAND/UL (ref 4.31–10.16)

## 2021-10-06 PROCEDURE — 80053 COMPREHEN METABOLIC PANEL: CPT

## 2021-10-06 PROCEDURE — 83690 ASSAY OF LIPASE: CPT

## 2021-10-06 PROCEDURE — 84100 ASSAY OF PHOSPHORUS: CPT

## 2021-10-06 PROCEDURE — 82465 ASSAY BLD/SERUM CHOLESTEROL: CPT

## 2021-10-06 PROCEDURE — 99214 OFFICE O/P EST MOD 30 MIN: CPT | Performed by: OBSTETRICS & GYNECOLOGY

## 2021-10-06 PROCEDURE — 85025 COMPLETE CBC W/AUTO DIFF WBC: CPT

## 2021-10-06 PROCEDURE — 82977 ASSAY OF GGT: CPT

## 2021-10-06 PROCEDURE — 84439 ASSAY OF FREE THYROXINE: CPT

## 2021-10-06 PROCEDURE — 85610 PROTHROMBIN TIME: CPT

## 2021-10-06 PROCEDURE — 83735 ASSAY OF MAGNESIUM: CPT

## 2021-10-06 PROCEDURE — 85730 THROMBOPLASTIN TIME PARTIAL: CPT

## 2021-10-06 PROCEDURE — 84480 ASSAY TRIIODOTHYRONINE (T3): CPT

## 2021-10-06 PROCEDURE — 82150 ASSAY OF AMYLASE: CPT

## 2021-10-06 PROCEDURE — 84443 ASSAY THYROID STIM HORMONE: CPT

## 2021-10-07 ENCOUNTER — TELEPHONE (OUTPATIENT)
Dept: OTHER | Facility: HOSPITAL | Age: 66
End: 2021-10-07

## 2021-10-07 RX ORDER — SODIUM CHLORIDE 9 MG/ML
20 INJECTION, SOLUTION INTRAVENOUS CONTINUOUS
Status: DISCONTINUED | OUTPATIENT
Start: 2021-10-11 | End: 2021-10-14 | Stop reason: HOSPADM

## 2021-10-08 ENCOUNTER — HOSPITAL ENCOUNTER (OUTPATIENT)
Dept: INFUSION CENTER | Facility: HOSPITAL | Age: 66
Discharge: HOME | End: 2021-10-08

## 2021-10-08 ENCOUNTER — APPOINTMENT (OUTPATIENT)
Dept: LAB | Facility: CLINIC | Age: 66
End: 2021-10-08
Payer: COMMERCIAL

## 2021-10-08 LAB
BACTERIA UR QL AUTO: ABNORMAL /HPF
BILIRUB UR QL STRIP: NEGATIVE
CAOX CRY URNS QL MICRO: ABNORMAL /HPF
CLARITY UR: ABNORMAL
COLOR UR: ABNORMAL
CREAT UR-MCNC: 62.2 MG/DL
GLUCOSE UR STRIP-MCNC: NEGATIVE MG/DL
HGB UR QL STRIP.AUTO: NEGATIVE
KETONES UR STRIP-MCNC: NEGATIVE MG/DL
LEUKOCYTE ESTERASE UR QL STRIP: ABNORMAL
NITRITE UR QL STRIP: NEGATIVE
NON-SQ EPI CELLS URNS QL MICRO: ABNORMAL /HPF
PH UR STRIP.AUTO: 5.5 [PH]
PROT UR STRIP-MCNC: NEGATIVE MG/DL
PROT UR-MCNC: 37 MG/DL
PROT/CREAT UR: 0.59 MG/G{CREAT} (ref 0–0.1)
RBC #/AREA URNS AUTO: ABNORMAL /HPF
SP GR UR STRIP.AUTO: 1.01 (ref 1–1.03)
UROBILINOGEN UR QL STRIP.AUTO: 1 E.U./DL
WBC #/AREA URNS AUTO: ABNORMAL /HPF

## 2021-10-08 PROCEDURE — 81001 URINALYSIS AUTO W/SCOPE: CPT

## 2021-10-08 PROCEDURE — 84156 ASSAY OF PROTEIN URINE: CPT

## 2021-10-08 PROCEDURE — 82570 ASSAY OF URINE CREATININE: CPT

## 2021-10-11 ENCOUNTER — NUTRITION (OUTPATIENT)
Dept: NUTRITION | Facility: CLINIC | Age: 66
End: 2021-10-11

## 2021-10-11 ENCOUNTER — DOCUMENTATION (OUTPATIENT)
Dept: OTHER | Facility: HOSPITAL | Age: 66
End: 2021-10-11

## 2021-10-11 ENCOUNTER — HOSPITAL ENCOUNTER (OUTPATIENT)
Dept: INFUSION CENTER | Facility: CLINIC | Age: 66
Discharge: HOME | End: 2021-10-11
Payer: COMMERCIAL

## 2021-10-11 VITALS
DIASTOLIC BLOOD PRESSURE: 80 MMHG | BODY MASS INDEX: 30.4 KG/M2 | HEART RATE: 84 BPM | SYSTOLIC BLOOD PRESSURE: 119 MMHG | OXYGEN SATURATION: 96 % | TEMPERATURE: 97.4 F | WEIGHT: 150.5 LBS | RESPIRATION RATE: 18 BRPM

## 2021-10-11 DIAGNOSIS — Z71.3 NUTRITIONAL COUNSELING: Primary | ICD-10-CM

## 2021-10-11 DIAGNOSIS — C54.1 ENDOMETRIAL CANCER (HCC): Primary | ICD-10-CM

## 2021-10-11 DIAGNOSIS — C54.1 ENDOMETRIAL CANCER (HCC): ICD-10-CM

## 2021-10-11 DIAGNOSIS — Z45.2 ENCOUNTER FOR CENTRAL LINE CARE: Primary | ICD-10-CM

## 2021-10-11 PROCEDURE — J9035Q0 INV BEVACIZUMAB 400MG/16ML 16 ML: Performed by: OBSTETRICS & GYNECOLOGY

## 2021-10-11 PROCEDURE — J9999Q0 INV ATEZOLIZUMAB 1200MG/20ML 20 ML: Performed by: OBSTETRICS & GYNECOLOGY

## 2021-10-11 PROCEDURE — 96413 CHEMO IV INFUSION 1 HR: CPT

## 2021-10-11 RX ADMIN — SODIUM CHLORIDE 20 ML/HR: 0.9 INJECTION, SOLUTION INTRAVENOUS at 09:11

## 2021-10-11 RX ADMIN — Medication 1200 MG: at 10:13

## 2021-10-11 RX ADMIN — Medication 1105 MG: at 10:58

## 2021-10-15 ENCOUNTER — TELEPHONE (OUTPATIENT)
Dept: NUTRITION | Facility: CLINIC | Age: 66
End: 2021-10-15

## 2021-10-22 ENCOUNTER — OFFICE VISIT (OUTPATIENT)
Dept: PALLIATIVE MEDICINE | Facility: CLINIC | Age: 66
End: 2021-10-22
Payer: COMMERCIAL

## 2021-10-22 VITALS
DIASTOLIC BLOOD PRESSURE: 60 MMHG | SYSTOLIC BLOOD PRESSURE: 100 MMHG | WEIGHT: 149.03 LBS | RESPIRATION RATE: 16 BRPM | OXYGEN SATURATION: 99 % | BODY MASS INDEX: 30.1 KG/M2 | TEMPERATURE: 97.3 F | HEART RATE: 75 BPM

## 2021-10-22 DIAGNOSIS — G47.00 INSOMNIA, UNSPECIFIED TYPE: Primary | ICD-10-CM

## 2021-10-22 DIAGNOSIS — R63.0 ANOREXIA: ICD-10-CM

## 2021-10-22 DIAGNOSIS — R64 CACHEXIA (HCC): ICD-10-CM

## 2021-10-22 PROCEDURE — 1036F TOBACCO NON-USER: CPT | Performed by: FAMILY MEDICINE

## 2021-10-22 PROCEDURE — 99214 OFFICE O/P EST MOD 30 MIN: CPT | Performed by: FAMILY MEDICINE

## 2021-10-22 RX ORDER — LANOLIN ALCOHOL/MO/W.PET/CERES
3 CREAM (GRAM) TOPICAL
Qty: 30 TABLET | Refills: 0 | Status: SHIPPED | OUTPATIENT
Start: 2021-10-22 | End: 2021-11-22

## 2021-10-22 RX ORDER — DRONABINOL 2.5 MG/1
2.5 CAPSULE ORAL
Qty: 60 CAPSULE | Refills: 0 | Status: SHIPPED | OUTPATIENT
Start: 2021-10-22 | End: 2021-11-19 | Stop reason: SDUPTHER

## 2021-10-25 ENCOUNTER — TELEPHONE (OUTPATIENT)
Dept: PALLIATIVE MEDICINE | Facility: CLINIC | Age: 66
End: 2021-10-25

## 2021-10-27 DIAGNOSIS — C54.1 ENDOMETRIAL CANCER (HCC): Primary | ICD-10-CM

## 2021-10-28 ENCOUNTER — TELEPHONE (OUTPATIENT)
Dept: NUTRITION | Facility: CLINIC | Age: 66
End: 2021-10-28

## 2021-10-29 ENCOUNTER — DOCUMENTATION (OUTPATIENT)
Dept: OTHER | Facility: HOSPITAL | Age: 66
End: 2021-10-29

## 2021-10-29 ENCOUNTER — APPOINTMENT (OUTPATIENT)
Dept: LAB | Facility: AMBULARY SURGERY CENTER | Age: 66
End: 2021-10-29
Payer: COMMERCIAL

## 2021-10-29 ENCOUNTER — HOSPITAL ENCOUNTER (OUTPATIENT)
Dept: INFUSION CENTER | Facility: HOSPITAL | Age: 66
Discharge: HOME | End: 2021-10-29
Payer: COMMERCIAL

## 2021-10-29 ENCOUNTER — OFFICE VISIT (OUTPATIENT)
Dept: GYNECOLOGIC ONCOLOGY | Facility: CLINIC | Age: 66
End: 2021-10-29
Payer: COMMERCIAL

## 2021-10-29 VITALS
BODY MASS INDEX: 30.14 KG/M2 | HEART RATE: 69 BPM | TEMPERATURE: 97.3 F | DIASTOLIC BLOOD PRESSURE: 82 MMHG | HEIGHT: 59 IN | SYSTOLIC BLOOD PRESSURE: 100 MMHG | WEIGHT: 149.5 LBS | RESPIRATION RATE: 16 BRPM

## 2021-10-29 VITALS — TEMPERATURE: 97 F

## 2021-10-29 DIAGNOSIS — C54.1 ENDOMETRIAL CANCER (HCC): Primary | Chronic | ICD-10-CM

## 2021-10-29 DIAGNOSIS — I82.4Y1 ACUTE DEEP VEIN THROMBOSIS (DVT) OF PROXIMAL VEIN OF RIGHT LOWER EXTREMITY (HCC): ICD-10-CM

## 2021-10-29 DIAGNOSIS — Z45.2 ENCOUNTER FOR CENTRAL LINE CARE: ICD-10-CM

## 2021-10-29 DIAGNOSIS — C54.1 ENDOMETRIAL CANCER (HCC): Primary | ICD-10-CM

## 2021-10-29 DIAGNOSIS — N39.0 CHRONIC UTI: ICD-10-CM

## 2021-10-29 LAB
ALBUMIN SERPL BCP-MCNC: 3.7 G/DL (ref 3.5–5)
ALP SERPL-CCNC: 139 U/L (ref 46–116)
ALT SERPL W P-5'-P-CCNC: 55 U/L (ref 12–78)
AMYLASE SERPL-CCNC: 29 IU/L (ref 25–115)
ANION GAP SERPL CALCULATED.3IONS-SCNC: 6 MMOL/L (ref 4–13)
APTT PPP: 50 SECONDS (ref 23–37)
AST SERPL W P-5'-P-CCNC: 53 U/L (ref 5–45)
BACTERIA UR QL AUTO: ABNORMAL /HPF
BASOPHILS # BLD AUTO: 0.05 THOUSANDS/ΜL (ref 0–0.1)
BASOPHILS NFR BLD AUTO: 1 % (ref 0–1)
BILIRUB SERPL-MCNC: 0.62 MG/DL (ref 0.2–1)
BILIRUB UR QL STRIP: NEGATIVE
BUN SERPL-MCNC: 17 MG/DL (ref 5–25)
CALCIUM SERPL-MCNC: 10.1 MG/DL (ref 8.3–10.1)
CHLORIDE SERPL-SCNC: 106 MMOL/L (ref 100–108)
CHOLEST SERPL-MCNC: 203 MG/DL (ref 50–200)
CLARITY UR: ABNORMAL
CO2 SERPL-SCNC: 22 MMOL/L (ref 21–32)
COLOR UR: YELLOW
CREAT SERPL-MCNC: 1.12 MG/DL (ref 0.6–1.3)
CREAT UR-MCNC: 15 MG/DL
EOSINOPHIL # BLD AUTO: 0.04 THOUSAND/ΜL (ref 0–0.61)
EOSINOPHIL NFR BLD AUTO: 1 % (ref 0–6)
ERYTHROCYTE [DISTWIDTH] IN BLOOD BY AUTOMATED COUNT: 13.4 % (ref 11.6–15.1)
GFR SERPL CREATININE-BSD FRML MDRD: 52 ML/MIN/1.73SQ M
GGT SERPL-CCNC: 31 U/L (ref 5–85)
GLUCOSE SERPL-MCNC: 93 MG/DL (ref 65–140)
GLUCOSE UR STRIP-MCNC: NEGATIVE MG/DL
HCT VFR BLD AUTO: 32.1 % (ref 34.8–46.1)
HGB BLD-MCNC: 10.3 G/DL (ref 11.5–15.4)
HGB UR QL STRIP.AUTO: ABNORMAL
HYALINE CASTS #/AREA URNS LPF: ABNORMAL /LPF
IMM GRANULOCYTES # BLD AUTO: 0.08 THOUSAND/UL (ref 0–0.2)
IMM GRANULOCYTES NFR BLD AUTO: 1 % (ref 0–2)
INR PPP: 1.12 (ref 0.84–1.19)
KETONES UR STRIP-MCNC: NEGATIVE MG/DL
LEUKOCYTE ESTERASE UR QL STRIP: ABNORMAL
LIPASE SERPL-CCNC: 122 U/L (ref 73–393)
LYMPHOCYTES # BLD AUTO: 0.84 THOUSANDS/ΜL (ref 0.6–4.47)
LYMPHOCYTES NFR BLD AUTO: 12 % (ref 14–44)
MAGNESIUM SERPL-MCNC: 1.9 MG/DL (ref 1.6–2.6)
MCH RBC QN AUTO: 32.6 PG (ref 26.8–34.3)
MCHC RBC AUTO-ENTMCNC: 32.1 G/DL (ref 31.4–37.4)
MCV RBC AUTO: 102 FL (ref 82–98)
MONOCYTES # BLD AUTO: 0.71 THOUSAND/ΜL (ref 0.17–1.22)
MONOCYTES NFR BLD AUTO: 10 % (ref 4–12)
NEUTROPHILS # BLD AUTO: 5.24 THOUSANDS/ΜL (ref 1.85–7.62)
NEUTS SEG NFR BLD AUTO: 75 % (ref 43–75)
NITRITE UR QL STRIP: NEGATIVE
NON-SQ EPI CELLS URNS QL MICRO: ABNORMAL /HPF
NRBC BLD AUTO-RTO: 0 /100 WBCS
PH UR STRIP.AUTO: 6 [PH]
PHOSPHATE SERPL-MCNC: 3.9 MG/DL (ref 2.3–4.1)
PLATELET # BLD AUTO: 263 THOUSANDS/UL (ref 149–390)
PMV BLD AUTO: 9.9 FL (ref 8.9–12.7)
POTASSIUM SERPL-SCNC: 4.1 MMOL/L (ref 3.5–5.3)
PROT SERPL-MCNC: 7.9 G/DL (ref 6.4–8.2)
PROT UR STRIP-MCNC: NEGATIVE MG/DL
PROT UR-MCNC: 13 MG/DL
PROT/CREAT UR: 0.87 MG/G{CREAT} (ref 0–0.1)
PROTHROMBIN TIME: 14 SECONDS (ref 11.6–14.5)
RBC # BLD AUTO: 3.16 MILLION/UL (ref 3.81–5.12)
RBC #/AREA URNS AUTO: ABNORMAL /HPF
SODIUM SERPL-SCNC: 134 MMOL/L (ref 136–145)
SP GR UR STRIP.AUTO: 1.01 (ref 1–1.03)
T3 SERPL-MCNC: 1.2 NG/ML (ref 0.6–1.8)
T4 FREE SERPL-MCNC: 1.15 NG/DL (ref 0.76–1.46)
TSH SERPL DL<=0.05 MIU/L-ACNC: 1.93 UIU/ML (ref 0.36–3.74)
UROBILINOGEN UR QL STRIP.AUTO: 0.2 E.U./DL
WBC # BLD AUTO: 6.96 THOUSAND/UL (ref 4.31–10.16)
WBC #/AREA URNS AUTO: ABNORMAL /HPF

## 2021-10-29 PROCEDURE — 84443 ASSAY THYROID STIM HORMONE: CPT

## 2021-10-29 PROCEDURE — 3008F BODY MASS INDEX DOCD: CPT | Performed by: FAMILY MEDICINE

## 2021-10-29 PROCEDURE — 81001 URINALYSIS AUTO W/SCOPE: CPT

## 2021-10-29 PROCEDURE — 85730 THROMBOPLASTIN TIME PARTIAL: CPT

## 2021-10-29 PROCEDURE — 84480 ASSAY TRIIODOTHYRONINE (T3): CPT

## 2021-10-29 PROCEDURE — 84156 ASSAY OF PROTEIN URINE: CPT

## 2021-10-29 PROCEDURE — 84439 ASSAY OF FREE THYROXINE: CPT

## 2021-10-29 PROCEDURE — 82150 ASSAY OF AMYLASE: CPT

## 2021-10-29 PROCEDURE — 84100 ASSAY OF PHOSPHORUS: CPT

## 2021-10-29 PROCEDURE — 82977 ASSAY OF GGT: CPT

## 2021-10-29 PROCEDURE — 83690 ASSAY OF LIPASE: CPT

## 2021-10-29 PROCEDURE — 80053 COMPREHEN METABOLIC PANEL: CPT

## 2021-10-29 PROCEDURE — 99214 OFFICE O/P EST MOD 30 MIN: CPT | Performed by: NURSE PRACTITIONER

## 2021-10-29 PROCEDURE — 85610 PROTHROMBIN TIME: CPT

## 2021-10-29 PROCEDURE — 82570 ASSAY OF URINE CREATININE: CPT

## 2021-10-29 PROCEDURE — 82465 ASSAY BLD/SERUM CHOLESTEROL: CPT

## 2021-10-29 PROCEDURE — 85025 COMPLETE CBC W/AUTO DIFF WBC: CPT

## 2021-10-29 PROCEDURE — 83735 ASSAY OF MAGNESIUM: CPT

## 2021-10-29 RX ORDER — SODIUM CHLORIDE 9 MG/ML
20 INJECTION, SOLUTION INTRAVENOUS CONTINUOUS
Status: DISCONTINUED | OUTPATIENT
Start: 2021-11-01 | End: 2021-11-04 | Stop reason: HOSPADM

## 2021-11-01 ENCOUNTER — NUTRITION (OUTPATIENT)
Dept: NUTRITION | Facility: CLINIC | Age: 66
End: 2021-11-01

## 2021-11-01 ENCOUNTER — DOCUMENTATION (OUTPATIENT)
Dept: OTHER | Facility: HOSPITAL | Age: 66
End: 2021-11-01

## 2021-11-01 ENCOUNTER — HOSPITAL ENCOUNTER (OUTPATIENT)
Dept: INFUSION CENTER | Facility: CLINIC | Age: 66
Discharge: HOME | End: 2021-11-01
Payer: COMMERCIAL

## 2021-11-01 VITALS
SYSTOLIC BLOOD PRESSURE: 108 MMHG | DIASTOLIC BLOOD PRESSURE: 76 MMHG | OXYGEN SATURATION: 98 % | HEART RATE: 69 BPM | RESPIRATION RATE: 16 BRPM | TEMPERATURE: 97 F | BODY MASS INDEX: 29.64 KG/M2 | WEIGHT: 147 LBS | HEIGHT: 59 IN

## 2021-11-01 DIAGNOSIS — C54.1 ENDOMETRIAL CANCER (HCC): ICD-10-CM

## 2021-11-01 DIAGNOSIS — Z71.3 NUTRITIONAL COUNSELING: Primary | ICD-10-CM

## 2021-11-01 DIAGNOSIS — Z45.2 ENCOUNTER FOR CENTRAL LINE CARE: Primary | ICD-10-CM

## 2021-11-01 PROCEDURE — 96413 CHEMO IV INFUSION 1 HR: CPT

## 2021-11-01 PROCEDURE — J9035Q0 INV BEVACIZUMAB 400MG/16ML 16 ML: Performed by: NURSE PRACTITIONER

## 2021-11-01 PROCEDURE — J9999Q0 INV ATEZOLIZUMAB 1200MG/20ML 20 ML: Performed by: NURSE PRACTITIONER

## 2021-11-01 PROCEDURE — 96417 CHEMO IV INFUS EACH ADDL SEQ: CPT

## 2021-11-01 RX ADMIN — Medication 1200 MG: at 08:58

## 2021-11-01 RX ADMIN — Medication 1089 MG: at 09:38

## 2021-11-01 RX ADMIN — SODIUM CHLORIDE 20 ML/HR: 0.9 INJECTION, SOLUTION INTRAVENOUS at 08:28

## 2021-11-05 ENCOUNTER — TELEPHONE (OUTPATIENT)
Dept: NUTRITION | Facility: CLINIC | Age: 66
End: 2021-11-05

## 2021-11-08 ENCOUNTER — TELEPHONE (OUTPATIENT)
Dept: HEMATOLOGY ONCOLOGY | Facility: CLINIC | Age: 66
End: 2021-11-08

## 2021-11-09 ENCOUNTER — TELEPHONE (OUTPATIENT)
Dept: PALLIATIVE MEDICINE | Facility: HOSPITAL | Age: 66
End: 2021-11-09

## 2021-11-11 ENCOUNTER — VBI (OUTPATIENT)
Dept: ADMINISTRATIVE | Facility: OTHER | Age: 66
End: 2021-11-11

## 2021-11-11 ENCOUNTER — TELEPHONE (OUTPATIENT)
Dept: UROLOGY | Facility: MEDICAL CENTER | Age: 66
End: 2021-11-11

## 2021-11-11 DIAGNOSIS — N32.81 OAB (OVERACTIVE BLADDER): Primary | ICD-10-CM

## 2021-11-16 ENCOUNTER — OFFICE VISIT (OUTPATIENT)
Dept: BARIATRICS | Facility: CLINIC | Age: 66
End: 2021-11-16
Payer: COMMERCIAL

## 2021-11-16 VITALS
TEMPERATURE: 98.5 F | SYSTOLIC BLOOD PRESSURE: 120 MMHG | HEART RATE: 91 BPM | WEIGHT: 147 LBS | DIASTOLIC BLOOD PRESSURE: 72 MMHG | BODY MASS INDEX: 29.64 KG/M2 | HEIGHT: 59 IN

## 2021-11-16 DIAGNOSIS — C54.1 ENDOMETRIAL CANCER (HCC): ICD-10-CM

## 2021-11-16 DIAGNOSIS — D64.9 ANEMIA: ICD-10-CM

## 2021-11-16 DIAGNOSIS — Z48.815 ENCOUNTER FOR SURGICAL AFTERCARE FOLLOWING SURGERY OF DIGESTIVE SYSTEM: Primary | ICD-10-CM

## 2021-11-16 DIAGNOSIS — R64 CACHEXIA (HCC): ICD-10-CM

## 2021-11-16 DIAGNOSIS — Z98.84 BARIATRIC SURGERY STATUS: ICD-10-CM

## 2021-11-16 DIAGNOSIS — K91.2 POSTSURGICAL MALABSORPTION: ICD-10-CM

## 2021-11-16 DIAGNOSIS — E66.9 OBESITY, CLASS I, BMI 30-34.9: ICD-10-CM

## 2021-11-16 DIAGNOSIS — K59.03 DRUG INDUCED CONSTIPATION: ICD-10-CM

## 2021-11-16 PROCEDURE — 99214 OFFICE O/P EST MOD 30 MIN: CPT | Performed by: PHYSICIAN ASSISTANT

## 2021-11-16 RX ORDER — CIPROFLOXACIN 500 MG/1
TABLET, FILM COATED ORAL
COMMUNITY
Start: 2021-08-25 | End: 2022-03-04 | Stop reason: ALTCHOICE

## 2021-11-17 DIAGNOSIS — C54.1 ENDOMETRIAL CANCER (HCC): Primary | ICD-10-CM

## 2021-11-19 ENCOUNTER — OFFICE VISIT (OUTPATIENT)
Dept: PALLIATIVE MEDICINE | Facility: CLINIC | Age: 66
End: 2021-11-19
Payer: COMMERCIAL

## 2021-11-19 ENCOUNTER — TELEPHONE (OUTPATIENT)
Dept: PALLIATIVE MEDICINE | Facility: CLINIC | Age: 66
End: 2021-11-19

## 2021-11-19 ENCOUNTER — DOCUMENTATION (OUTPATIENT)
Dept: OTHER | Facility: HOSPITAL | Age: 66
End: 2021-11-19

## 2021-11-19 ENCOUNTER — HOSPITAL ENCOUNTER (OUTPATIENT)
Dept: INFUSION CENTER | Facility: HOSPITAL | Age: 66
Discharge: HOME | End: 2021-11-19
Payer: COMMERCIAL

## 2021-11-19 ENCOUNTER — OFFICE VISIT (OUTPATIENT)
Dept: GYNECOLOGIC ONCOLOGY | Facility: CLINIC | Age: 66
End: 2021-11-19
Payer: COMMERCIAL

## 2021-11-19 VITALS
SYSTOLIC BLOOD PRESSURE: 112 MMHG | TEMPERATURE: 96.4 F | WEIGHT: 141.76 LBS | HEART RATE: 79 BPM | HEIGHT: 59 IN | OXYGEN SATURATION: 98 % | BODY MASS INDEX: 28.58 KG/M2 | DIASTOLIC BLOOD PRESSURE: 74 MMHG | RESPIRATION RATE: 18 BRPM

## 2021-11-19 VITALS
RESPIRATION RATE: 18 BRPM | HEART RATE: 80 BPM | TEMPERATURE: 98.2 F | BODY MASS INDEX: 28.93 KG/M2 | WEIGHT: 143.5 LBS | HEIGHT: 59 IN

## 2021-11-19 DIAGNOSIS — R11.0 CHEMOTHERAPY-INDUCED NAUSEA: Primary | ICD-10-CM

## 2021-11-19 DIAGNOSIS — R64 CACHEXIA (HCC): ICD-10-CM

## 2021-11-19 DIAGNOSIS — C54.1 ENDOMETRIAL CANCER (HCC): Primary | Chronic | ICD-10-CM

## 2021-11-19 DIAGNOSIS — R63.0 ANOREXIA: ICD-10-CM

## 2021-11-19 DIAGNOSIS — Z45.2 ENCOUNTER FOR CENTRAL LINE CARE: ICD-10-CM

## 2021-11-19 DIAGNOSIS — E66.9 OBESITY, CLASS I, BMI 30-34.9: ICD-10-CM

## 2021-11-19 DIAGNOSIS — M89.9 PUBIC BONE PAIN: ICD-10-CM

## 2021-11-19 DIAGNOSIS — T45.1X5A CHEMOTHERAPY-INDUCED NAUSEA: Primary | ICD-10-CM

## 2021-11-19 DIAGNOSIS — Z98.84 BARIATRIC SURGERY STATUS: ICD-10-CM

## 2021-11-19 DIAGNOSIS — D64.9 ANEMIA: ICD-10-CM

## 2021-11-19 DIAGNOSIS — I82.4Y1 ACUTE DEEP VEIN THROMBOSIS (DVT) OF PROXIMAL VEIN OF RIGHT LOWER EXTREMITY (HCC): ICD-10-CM

## 2021-11-19 DIAGNOSIS — K91.2 POSTSURGICAL MALABSORPTION: ICD-10-CM

## 2021-11-19 DIAGNOSIS — Z48.815 ENCOUNTER FOR SURGICAL AFTERCARE FOLLOWING SURGERY OF DIGESTIVE SYSTEM: ICD-10-CM

## 2021-11-19 DIAGNOSIS — C54.1 ENDOMETRIAL CANCER (HCC): Primary | ICD-10-CM

## 2021-11-19 LAB
25(OH)D3 SERPL-MCNC: 27.6 NG/ML (ref 30–100)
ALBUMIN SERPL BCP-MCNC: 3.6 G/DL (ref 3.5–5)
ALP SERPL-CCNC: 106 U/L (ref 46–116)
ALT SERPL W P-5'-P-CCNC: 37 U/L (ref 12–78)
AMYLASE SERPL-CCNC: 37 IU/L (ref 25–115)
ANION GAP SERPL CALCULATED.3IONS-SCNC: 9 MMOL/L (ref 4–13)
APTT PPP: 51 SECONDS (ref 23–37)
AST SERPL W P-5'-P-CCNC: 38 U/L (ref 5–45)
BASOPHILS # BLD AUTO: 0.03 THOUSANDS/ΜL (ref 0–0.1)
BASOPHILS NFR BLD AUTO: 1 % (ref 0–1)
BILIRUB SERPL-MCNC: 0.31 MG/DL (ref 0.2–1)
BUN SERPL-MCNC: 20 MG/DL (ref 5–25)
CALCIUM SERPL-MCNC: 9.7 MG/DL (ref 8.3–10.1)
CHLORIDE SERPL-SCNC: 107 MMOL/L (ref 100–108)
CHOLEST SERPL-MCNC: 205 MG/DL (ref 50–200)
CO2 SERPL-SCNC: 20 MMOL/L (ref 21–32)
CREAT SERPL-MCNC: 1.15 MG/DL (ref 0.6–1.3)
EOSINOPHIL # BLD AUTO: 0.02 THOUSAND/ΜL (ref 0–0.61)
EOSINOPHIL NFR BLD AUTO: 0 % (ref 0–6)
ERYTHROCYTE [DISTWIDTH] IN BLOOD BY AUTOMATED COUNT: 14 % (ref 11.6–15.1)
FERRITIN SERPL-MCNC: 1429 NG/ML (ref 8–388)
GFR SERPL CREATININE-BSD FRML MDRD: 50 ML/MIN/1.73SQ M
GGT SERPL-CCNC: 18 U/L (ref 5–85)
GLUCOSE SERPL-MCNC: 92 MG/DL (ref 65–140)
HCT VFR BLD AUTO: 31.9 % (ref 34.8–46.1)
HGB BLD-MCNC: 10.2 G/DL (ref 11.5–15.4)
IMM GRANULOCYTES # BLD AUTO: 0.05 THOUSAND/UL (ref 0–0.2)
IMM GRANULOCYTES NFR BLD AUTO: 1 % (ref 0–2)
INR PPP: 1.09 (ref 0.84–1.19)
IRON SATN MFR SERPL: 25 % (ref 15–50)
IRON SERPL-MCNC: 92 UG/DL (ref 50–170)
LIPASE SERPL-CCNC: 144 U/L (ref 73–393)
LYMPHOCYTES # BLD AUTO: 0.61 THOUSANDS/ΜL (ref 0.6–4.47)
LYMPHOCYTES NFR BLD AUTO: 9 % (ref 14–44)
MAGNESIUM SERPL-MCNC: 1.9 MG/DL (ref 1.6–2.6)
MCH RBC QN AUTO: 32 PG (ref 26.8–34.3)
MCHC RBC AUTO-ENTMCNC: 32 G/DL (ref 31.4–37.4)
MCV RBC AUTO: 100 FL (ref 82–98)
MONOCYTES # BLD AUTO: 0.71 THOUSAND/ΜL (ref 0.17–1.22)
MONOCYTES NFR BLD AUTO: 11 % (ref 4–12)
NEUTROPHILS # BLD AUTO: 5.08 THOUSANDS/ΜL (ref 1.85–7.62)
NEUTS SEG NFR BLD AUTO: 78 % (ref 43–75)
NRBC BLD AUTO-RTO: 0 /100 WBCS
PHOSPHATE SERPL-MCNC: 4.4 MG/DL (ref 2.3–4.1)
PLATELET # BLD AUTO: 242 THOUSANDS/UL (ref 149–390)
PMV BLD AUTO: 10.5 FL (ref 8.9–12.7)
POTASSIUM SERPL-SCNC: 3.9 MMOL/L (ref 3.5–5.3)
PROT SERPL-MCNC: 7.7 G/DL (ref 6.4–8.2)
PROTHROMBIN TIME: 13.6 SECONDS (ref 11.6–14.5)
PTH-INTACT SERPL-MCNC: 236.6 PG/ML (ref 18.4–80.1)
RBC # BLD AUTO: 3.19 MILLION/UL (ref 3.81–5.12)
SODIUM SERPL-SCNC: 136 MMOL/L (ref 136–145)
T3 SERPL-MCNC: 1 NG/ML (ref 0.6–1.8)
T4 FREE SERPL-MCNC: 1.2 NG/DL (ref 0.76–1.46)
TIBC SERPL-MCNC: 374 UG/DL (ref 250–450)
TSH SERPL DL<=0.05 MIU/L-ACNC: 0.88 UIU/ML (ref 0.36–3.74)
VIT B12 SERPL-MCNC: 1354 PG/ML (ref 100–900)
WBC # BLD AUTO: 6.5 THOUSAND/UL (ref 4.31–10.16)

## 2021-11-19 PROCEDURE — 82465 ASSAY BLD/SERUM CHOLESTEROL: CPT

## 2021-11-19 PROCEDURE — 83690 ASSAY OF LIPASE: CPT

## 2021-11-19 PROCEDURE — 82306 VITAMIN D 25 HYDROXY: CPT

## 2021-11-19 PROCEDURE — 3008F BODY MASS INDEX DOCD: CPT | Performed by: NURSE PRACTITIONER

## 2021-11-19 PROCEDURE — 82728 ASSAY OF FERRITIN: CPT

## 2021-11-19 PROCEDURE — 80053 COMPREHEN METABOLIC PANEL: CPT

## 2021-11-19 PROCEDURE — 84425 ASSAY OF VITAMIN B-1: CPT

## 2021-11-19 PROCEDURE — 85730 THROMBOPLASTIN TIME PARTIAL: CPT

## 2021-11-19 PROCEDURE — 82607 VITAMIN B-12: CPT

## 2021-11-19 PROCEDURE — 82977 ASSAY OF GGT: CPT

## 2021-11-19 PROCEDURE — 83735 ASSAY OF MAGNESIUM: CPT

## 2021-11-19 PROCEDURE — 99214 OFFICE O/P EST MOD 30 MIN: CPT | Performed by: NURSE PRACTITIONER

## 2021-11-19 PROCEDURE — 84443 ASSAY THYROID STIM HORMONE: CPT

## 2021-11-19 PROCEDURE — 83550 IRON BINDING TEST: CPT

## 2021-11-19 PROCEDURE — 83540 ASSAY OF IRON: CPT

## 2021-11-19 PROCEDURE — 84630 ASSAY OF ZINC: CPT

## 2021-11-19 PROCEDURE — 99214 OFFICE O/P EST MOD 30 MIN: CPT | Performed by: INTERNAL MEDICINE

## 2021-11-19 PROCEDURE — 84480 ASSAY TRIIODOTHYRONINE (T3): CPT

## 2021-11-19 PROCEDURE — 85610 PROTHROMBIN TIME: CPT

## 2021-11-19 PROCEDURE — 84590 ASSAY OF VITAMIN A: CPT

## 2021-11-19 PROCEDURE — 82150 ASSAY OF AMYLASE: CPT

## 2021-11-19 PROCEDURE — 84439 ASSAY OF FREE THYROXINE: CPT

## 2021-11-19 PROCEDURE — 85025 COMPLETE CBC W/AUTO DIFF WBC: CPT

## 2021-11-19 PROCEDURE — 84100 ASSAY OF PHOSPHORUS: CPT

## 2021-11-19 PROCEDURE — 1036F TOBACCO NON-USER: CPT | Performed by: INTERNAL MEDICINE

## 2021-11-19 PROCEDURE — 83970 ASSAY OF PARATHORMONE: CPT

## 2021-11-19 RX ORDER — DRONABINOL 2.5 MG/1
2.5 CAPSULE ORAL
Qty: 60 CAPSULE | Refills: 0 | Status: SHIPPED | OUTPATIENT
Start: 2021-11-19 | End: 2022-01-06 | Stop reason: SDUPTHER

## 2021-11-19 RX ORDER — SODIUM CHLORIDE 9 MG/ML
20 INJECTION, SOLUTION INTRAVENOUS CONTINUOUS
Status: DISCONTINUED | OUTPATIENT
Start: 2021-11-19 | End: 2021-11-19

## 2021-11-19 RX ORDER — SODIUM CHLORIDE 9 MG/ML
20 INJECTION, SOLUTION INTRAVENOUS CONTINUOUS
Status: DISCONTINUED | OUTPATIENT
Start: 2021-11-22 | End: 2021-11-25 | Stop reason: HOSPADM

## 2021-11-20 ENCOUNTER — APPOINTMENT (OUTPATIENT)
Dept: LAB | Facility: CLINIC | Age: 66
End: 2021-11-20
Payer: COMMERCIAL

## 2021-11-20 LAB
BACTERIA UR QL AUTO: ABNORMAL /HPF
BILIRUB UR QL STRIP: NEGATIVE
CLARITY UR: CLEAR
COLOR UR: YELLOW
CREAT UR-MCNC: 23 MG/DL
GLUCOSE UR STRIP-MCNC: NEGATIVE MG/DL
HGB UR QL STRIP.AUTO: ABNORMAL
KETONES UR STRIP-MCNC: NEGATIVE MG/DL
LEUKOCYTE ESTERASE UR QL STRIP: ABNORMAL
NITRITE UR QL STRIP: NEGATIVE
NON-SQ EPI CELLS URNS QL MICRO: ABNORMAL /HPF
PH UR STRIP.AUTO: 5.5 [PH]
PROT UR STRIP-MCNC: NEGATIVE MG/DL
PROT UR-MCNC: 27 MG/DL
PROT/CREAT UR: 1.17 MG/G{CREAT} (ref 0–0.1)
RBC #/AREA URNS AUTO: ABNORMAL /HPF
SP GR UR STRIP.AUTO: 1.01 (ref 1–1.03)
UROBILINOGEN UR QL STRIP.AUTO: 0.2 E.U./DL
WBC #/AREA URNS AUTO: ABNORMAL /HPF

## 2021-11-20 PROCEDURE — 82570 ASSAY OF URINE CREATININE: CPT

## 2021-11-20 PROCEDURE — 84156 ASSAY OF PROTEIN URINE: CPT

## 2021-11-20 PROCEDURE — 81001 URINALYSIS AUTO W/SCOPE: CPT

## 2021-11-22 ENCOUNTER — DOCUMENTATION (OUTPATIENT)
Dept: OTHER | Facility: HOSPITAL | Age: 66
End: 2021-11-22

## 2021-11-22 ENCOUNTER — HOSPITAL ENCOUNTER (OUTPATIENT)
Dept: INFUSION CENTER | Facility: CLINIC | Age: 66
Discharge: HOME | End: 2021-11-22
Payer: COMMERCIAL

## 2021-11-22 VITALS
BODY MASS INDEX: 29.07 KG/M2 | OXYGEN SATURATION: 100 % | DIASTOLIC BLOOD PRESSURE: 64 MMHG | WEIGHT: 144.18 LBS | TEMPERATURE: 97.4 F | SYSTOLIC BLOOD PRESSURE: 90 MMHG | RESPIRATION RATE: 18 BRPM | HEIGHT: 59 IN | HEART RATE: 67 BPM

## 2021-11-22 DIAGNOSIS — G47.00 INSOMNIA, UNSPECIFIED TYPE: ICD-10-CM

## 2021-11-22 PROCEDURE — 96417 CHEMO IV INFUS EACH ADDL SEQ: CPT

## 2021-11-22 PROCEDURE — J9035Q0 INV BEVACIZUMAB 400MG/16ML 16 ML: Performed by: NURSE PRACTITIONER

## 2021-11-22 PROCEDURE — J9999Q0 INV ATEZOLIZUMAB 1200MG/20ML 20 ML: Performed by: NURSE PRACTITIONER

## 2021-11-22 PROCEDURE — 96413 CHEMO IV INFUSION 1 HR: CPT

## 2021-11-22 RX ORDER — OMEPRAZOLE MAGNESIUM 20 MG
CAPSULE,DELAYED RELEASE (ENTERIC COATED) ORAL
Qty: 30 TABLET | Refills: 0 | Status: SHIPPED | OUTPATIENT
Start: 2021-11-22 | End: 2021-11-23 | Stop reason: SDUPTHER

## 2021-11-22 RX ADMIN — SODIUM CHLORIDE 20 ML/HR: 0.9 INJECTION, SOLUTION INTRAVENOUS at 08:15

## 2021-11-22 RX ADMIN — Medication 1200 MG: at 08:55

## 2021-11-22 RX ADMIN — Medication 978 MG: at 09:34

## 2021-11-23 DIAGNOSIS — G47.00 INSOMNIA, UNSPECIFIED TYPE: ICD-10-CM

## 2021-11-23 RX ORDER — OMEPRAZOLE MAGNESIUM 20 MG
CAPSULE,DELAYED RELEASE (ENTERIC COATED) ORAL
Qty: 90 TABLET | Refills: 1 | Status: SHIPPED | OUTPATIENT
Start: 2021-11-23 | End: 2022-03-04 | Stop reason: HOSPADM

## 2021-11-26 LAB
VIT B1 BLD-SCNC: 79.7 NMOL/L (ref 66.5–200)
ZINC SERPL-MCNC: 63 UG/DL (ref 44–115)

## 2021-11-29 LAB — VIT A SERPL-MCNC: 47.1 UG/DL (ref 22–69.5)

## 2021-11-30 ENCOUNTER — TELEPHONE (OUTPATIENT)
Dept: RADIOLOGY | Facility: HOSPITAL | Age: 66
End: 2021-11-30

## 2021-11-30 ENCOUNTER — NUTRITION (OUTPATIENT)
Dept: NUTRITION | Facility: CLINIC | Age: 66
End: 2021-11-30

## 2021-11-30 DIAGNOSIS — Z71.3 NUTRITIONAL COUNSELING: Primary | ICD-10-CM

## 2021-11-30 RX ORDER — SODIUM CHLORIDE 9 MG/ML
75 INJECTION, SOLUTION INTRAVENOUS CONTINUOUS
Status: CANCELLED | OUTPATIENT
Start: 2021-11-30

## 2021-12-02 DIAGNOSIS — C54.1 ENDOMETRIAL CANCER (HCC): Primary | ICD-10-CM

## 2021-12-06 ENCOUNTER — TELEPHONE (OUTPATIENT)
Dept: HEMATOLOGY ONCOLOGY | Facility: CLINIC | Age: 66
End: 2021-12-06

## 2021-12-07 ENCOUNTER — HOSPITAL ENCOUNTER (OUTPATIENT)
Dept: RADIOLOGY | Facility: HOSPITAL | Age: 66
Discharge: HOME | End: 2021-12-07
Attending: INTERNAL MEDICINE | Admitting: RADIOLOGY
Payer: COMMERCIAL

## 2021-12-07 ENCOUNTER — TELEPHONE (OUTPATIENT)
Dept: GYNECOLOGIC ONCOLOGY | Facility: CLINIC | Age: 66
End: 2021-12-07

## 2021-12-07 VITALS
RESPIRATION RATE: 18 BRPM | TEMPERATURE: 97.5 F | SYSTOLIC BLOOD PRESSURE: 118 MMHG | HEART RATE: 71 BPM | OXYGEN SATURATION: 100 % | DIASTOLIC BLOOD PRESSURE: 58 MMHG

## 2021-12-07 DIAGNOSIS — N13.5 OBSTRUCTION OF RIGHT URETER: ICD-10-CM

## 2021-12-07 PROCEDURE — C1769 GUIDE WIRE: HCPCS

## 2021-12-07 PROCEDURE — 90682 RIV4 VACC RECOMBINANT DNA IM: CPT | Performed by: INTERNAL MEDICINE

## 2021-12-07 PROCEDURE — 99152 MOD SED SAME PHYS/QHP 5/>YRS: CPT | Performed by: RADIOLOGY

## 2021-12-07 PROCEDURE — 90471 IMMUNIZATION ADMIN: CPT | Performed by: INTERNAL MEDICINE

## 2021-12-07 PROCEDURE — 50387 CHANGE NEPHROURETERAL CATH: CPT

## 2021-12-07 PROCEDURE — 99152 MOD SED SAME PHYS/QHP 5/>YRS: CPT

## 2021-12-07 PROCEDURE — C2617 STENT, NON-COR, TEM W/O DEL: HCPCS

## 2021-12-07 PROCEDURE — 50387 CHANGE NEPHROURETERAL CATH: CPT | Performed by: RADIOLOGY

## 2021-12-07 PROCEDURE — 99153 MOD SED SAME PHYS/QHP EA: CPT

## 2021-12-07 RX ORDER — FENTANYL CITRATE 50 UG/ML
INJECTION, SOLUTION INTRAMUSCULAR; INTRAVENOUS CODE/TRAUMA/SEDATION MEDICATION
Status: COMPLETED | OUTPATIENT
Start: 2021-12-07 | End: 2021-12-07

## 2021-12-07 RX ORDER — ACETAMINOPHEN 325 MG/1
650 TABLET ORAL EVERY 4 HOURS PRN
Status: DISCONTINUED | OUTPATIENT
Start: 2021-12-07 | End: 2021-12-07 | Stop reason: HOSPADM

## 2021-12-07 RX ORDER — SODIUM CHLORIDE 9 MG/ML
75 INJECTION, SOLUTION INTRAVENOUS CONTINUOUS
Status: DISCONTINUED | OUTPATIENT
Start: 2021-12-07 | End: 2021-12-07 | Stop reason: HOSPADM

## 2021-12-07 RX ORDER — CIPROFLOXACIN 500 MG/1
500 TABLET, FILM COATED ORAL ONCE
Qty: 1 TABLET | Refills: 0 | Status: SHIPPED | OUTPATIENT
Start: 2021-12-07 | End: 2021-12-07

## 2021-12-07 RX ORDER — MIDAZOLAM HYDROCHLORIDE 2 MG/2ML
INJECTION, SOLUTION INTRAMUSCULAR; INTRAVENOUS CODE/TRAUMA/SEDATION MEDICATION
Status: COMPLETED | OUTPATIENT
Start: 2021-12-07 | End: 2021-12-07

## 2021-12-07 RX ADMIN — SODIUM CHLORIDE 75 ML/HR: 0.9 INJECTION, SOLUTION INTRAVENOUS at 09:35

## 2021-12-07 RX ADMIN — FENTANYL CITRATE 50 MCG: 50 INJECTION INTRAMUSCULAR; INTRAVENOUS at 10:01

## 2021-12-07 RX ADMIN — MIDAZOLAM 1 MG: 1 INJECTION INTRAMUSCULAR; INTRAVENOUS at 10:07

## 2021-12-07 RX ADMIN — MIDAZOLAM 1 MG: 1 INJECTION INTRAMUSCULAR; INTRAVENOUS at 10:01

## 2021-12-07 RX ADMIN — FENTANYL CITRATE 50 MCG: 50 INJECTION INTRAMUSCULAR; INTRAVENOUS at 10:07

## 2021-12-07 RX ADMIN — IOHEXOL 10 ML: 350 INJECTION, SOLUTION INTRAVENOUS at 10:27

## 2021-12-07 RX ADMIN — MIDAZOLAM 1 MG: 1 INJECTION INTRAMUSCULAR; INTRAVENOUS at 10:11

## 2021-12-07 RX ADMIN — INFLUENZA A VIRUS A/WISCONSIN/588/2019 (H1N1) RECOMBINANT HEMAGGLUTININ ANTIGEN, INFLUENZA A VIRUS A/TASMANIA/503/2020 (H3N2) RECOMBINANT HEMAGGLUTININ ANTIGEN, INFLUENZA B VIRUS B/WASHINGTON/02/2019 RECOMBINANT HEMAGGLUTININ ANTIGEN, AND INFLUENZA B VIRUS B/PHUKET/3073/2013 RECOMBINANT HEMAGGLUTININ ANTIGEN 0.5 ML: 45; 45; 45; 45 INJECTION INTRAMUSCULAR at 11:39

## 2021-12-08 ENCOUNTER — HOSPITAL ENCOUNTER (OUTPATIENT)
Dept: RADIOLOGY | Facility: HOSPITAL | Age: 66
Discharge: HOME | End: 2021-12-08
Attending: OBSTETRICS & GYNECOLOGY
Payer: COMMERCIAL

## 2021-12-08 DIAGNOSIS — C54.1 ENDOMETRIAL CANCER (HCC): ICD-10-CM

## 2021-12-08 PROCEDURE — G1004 CDSM NDSC: HCPCS

## 2021-12-08 PROCEDURE — 71260 CT THORAX DX C+: CPT

## 2021-12-08 PROCEDURE — 74177 CT ABD & PELVIS W/CONTRAST: CPT

## 2021-12-08 RX ADMIN — IOHEXOL 100 ML: 350 INJECTION, SOLUTION INTRAVENOUS at 08:37

## 2021-12-09 ENCOUNTER — HOSPITAL ENCOUNTER (OUTPATIENT)
Dept: NON INVASIVE DIAGNOSTICS | Facility: CLINIC | Age: 66
Discharge: HOME | End: 2021-12-09
Payer: COMMERCIAL

## 2021-12-09 VITALS
WEIGHT: 144 LBS | HEART RATE: 71 BPM | BODY MASS INDEX: 29.03 KG/M2 | DIASTOLIC BLOOD PRESSURE: 58 MMHG | HEIGHT: 59 IN | SYSTOLIC BLOOD PRESSURE: 118 MMHG

## 2021-12-09 DIAGNOSIS — C54.1 ENDOMETRIAL CANCER (HCC): Primary | ICD-10-CM

## 2021-12-09 DIAGNOSIS — C54.1 ENDOMETRIAL CANCER (HCC): ICD-10-CM

## 2021-12-09 LAB
E WAVE DECELERATION TIME: 238 MS
GLOBAL LONGITUIDAL STRAIN: -19 %
MV E'TISSUE VEL-SEP: 6 CM/S
MV PEAK A VEL: 0.81 M/S
MV PEAK E VEL: 52 CM/S
MV STENOSIS PRESSURE HALF TIME: 0 MS
SL CV LV EF: 65
TRICUSPID VALVE PEAK REGURGITATION VELOCITY: 2.84 M/S
TRICUSPID VALVE S': 0.5 CM/S
TV PEAK GRADIENT: 32 MMHG

## 2021-12-09 PROCEDURE — 93356 MYOCRD STRAIN IMG SPCKL TRCK: CPT | Performed by: INTERNAL MEDICINE

## 2021-12-09 PROCEDURE — 93308 TTE F-UP OR LMTD: CPT

## 2021-12-09 PROCEDURE — 93308 TTE F-UP OR LMTD: CPT | Performed by: INTERNAL MEDICINE

## 2021-12-09 PROCEDURE — 93356 MYOCRD STRAIN IMG SPCKL TRCK: CPT

## 2021-12-09 PROCEDURE — 93325 DOPPLER ECHO COLOR FLOW MAPG: CPT | Performed by: INTERNAL MEDICINE

## 2021-12-09 PROCEDURE — 93321 DOPPLER ECHO F-UP/LMTD STD: CPT | Performed by: INTERNAL MEDICINE

## 2021-12-10 ENCOUNTER — DOCUMENTATION (OUTPATIENT)
Dept: OTHER | Facility: HOSPITAL | Age: 66
End: 2021-12-10

## 2021-12-10 ENCOUNTER — OFFICE VISIT (OUTPATIENT)
Dept: GYNECOLOGIC ONCOLOGY | Facility: CLINIC | Age: 66
End: 2021-12-10
Payer: COMMERCIAL

## 2021-12-10 ENCOUNTER — HOSPITAL ENCOUNTER (OUTPATIENT)
Dept: INFUSION CENTER | Facility: HOSPITAL | Age: 66
Discharge: HOME | End: 2021-12-10
Payer: COMMERCIAL

## 2021-12-10 VITALS
DIASTOLIC BLOOD PRESSURE: 80 MMHG | WEIGHT: 143 LBS | HEIGHT: 59 IN | SYSTOLIC BLOOD PRESSURE: 122 MMHG | OXYGEN SATURATION: 99 % | TEMPERATURE: 98 F | HEART RATE: 75 BPM | BODY MASS INDEX: 28.83 KG/M2 | RESPIRATION RATE: 16 BRPM

## 2021-12-10 VITALS — TEMPERATURE: 97.5 F

## 2021-12-10 DIAGNOSIS — C54.1 ENDOMETRIAL CANCER (HCC): ICD-10-CM

## 2021-12-10 DIAGNOSIS — I82.4Y1 ACUTE DEEP VEIN THROMBOSIS (DVT) OF PROXIMAL VEIN OF RIGHT LOWER EXTREMITY (HCC): ICD-10-CM

## 2021-12-10 DIAGNOSIS — C54.1 ENDOMETRIAL CANCER (HCC): Primary | Chronic | ICD-10-CM

## 2021-12-10 DIAGNOSIS — E83.42 HYPOMAGNESEMIA: ICD-10-CM

## 2021-12-10 DIAGNOSIS — T45.1X5A CHEMOTHERAPY INDUCED NEUTROPENIA (HCC): ICD-10-CM

## 2021-12-10 DIAGNOSIS — M80.00XS OSTEOPOROSIS WITH CURRENT PATHOLOGICAL FRACTURE, UNSPECIFIED OSTEOPOROSIS TYPE, SEQUELA: Primary | ICD-10-CM

## 2021-12-10 DIAGNOSIS — R11.0 CHEMOTHERAPY-INDUCED NAUSEA: ICD-10-CM

## 2021-12-10 DIAGNOSIS — T45.1X5A CHEMOTHERAPY-INDUCED NAUSEA: ICD-10-CM

## 2021-12-10 DIAGNOSIS — K59.03 DRUG INDUCED CONSTIPATION: ICD-10-CM

## 2021-12-10 DIAGNOSIS — D70.1 CHEMOTHERAPY INDUCED NEUTROPENIA (HCC): ICD-10-CM

## 2021-12-10 LAB
ALBUMIN SERPL BCP-MCNC: 3.4 G/DL (ref 3.5–5)
ALP SERPL-CCNC: 137 U/L (ref 46–116)
ALT SERPL W P-5'-P-CCNC: 27 U/L (ref 12–78)
AMYLASE SERPL-CCNC: 28 IU/L (ref 25–115)
ANION GAP SERPL CALCULATED.3IONS-SCNC: 8 MMOL/L (ref 4–13)
APTT PPP: 70 SECONDS (ref 23–37)
AST SERPL W P-5'-P-CCNC: 19 U/L (ref 5–45)
BASOPHILS # BLD AUTO: 0.05 THOUSANDS/ΜL (ref 0–0.1)
BASOPHILS NFR BLD AUTO: 1 % (ref 0–1)
BILIRUB SERPL-MCNC: 0.26 MG/DL (ref 0.2–1)
BUN SERPL-MCNC: 16 MG/DL (ref 5–25)
CALCIUM ALBUM COR SERPL-MCNC: 10.2 MG/DL (ref 8.3–10.1)
CALCIUM SERPL-MCNC: 9.7 MG/DL (ref 8.3–10.1)
CHLORIDE SERPL-SCNC: 109 MMOL/L (ref 100–108)
CHOLEST SERPL-MCNC: 183 MG/DL
CO2 SERPL-SCNC: 20 MMOL/L (ref 21–32)
CREAT SERPL-MCNC: 1.11 MG/DL (ref 0.6–1.3)
EOSINOPHIL # BLD AUTO: 0.02 THOUSAND/ΜL (ref 0–0.61)
EOSINOPHIL NFR BLD AUTO: 0 % (ref 0–6)
ERYTHROCYTE [DISTWIDTH] IN BLOOD BY AUTOMATED COUNT: 13.9 % (ref 11.6–15.1)
GFR SERPL CREATININE-BSD FRML MDRD: 53 ML/MIN/1.73SQ M
GGT SERPL-CCNC: 16 U/L (ref 5–85)
GLUCOSE SERPL-MCNC: 93 MG/DL (ref 65–140)
HCT VFR BLD AUTO: 29.7 % (ref 34.8–46.1)
HGB BLD-MCNC: 9.6 G/DL (ref 11.5–15.4)
IMM GRANULOCYTES # BLD AUTO: 0.15 THOUSAND/UL (ref 0–0.2)
IMM GRANULOCYTES NFR BLD AUTO: 2 % (ref 0–2)
INR PPP: 1.1 (ref 0.84–1.19)
LIPASE SERPL-CCNC: 114 U/L (ref 73–393)
LYMPHOCYTES # BLD AUTO: 0.52 THOUSANDS/ΜL (ref 0.6–4.47)
LYMPHOCYTES NFR BLD AUTO: 8 % (ref 14–44)
MAGNESIUM SERPL-MCNC: 2 MG/DL (ref 1.6–2.6)
MCH RBC QN AUTO: 32.3 PG (ref 26.8–34.3)
MCHC RBC AUTO-ENTMCNC: 32.3 G/DL (ref 31.4–37.4)
MCV RBC AUTO: 100 FL (ref 82–98)
MONOCYTES # BLD AUTO: 0.72 THOUSAND/ΜL (ref 0.17–1.22)
MONOCYTES NFR BLD AUTO: 11 % (ref 4–12)
NEUTROPHILS # BLD AUTO: 5 THOUSANDS/ΜL (ref 1.85–7.62)
NEUTS SEG NFR BLD AUTO: 78 % (ref 43–75)
NRBC BLD AUTO-RTO: 0 /100 WBCS
PHOSPHATE SERPL-MCNC: 3.4 MG/DL (ref 2.3–4.1)
PLATELET # BLD AUTO: 283 THOUSANDS/UL (ref 149–390)
PMV BLD AUTO: 10.2 FL (ref 8.9–12.7)
POTASSIUM SERPL-SCNC: 4.2 MMOL/L (ref 3.5–5.3)
PROT SERPL-MCNC: 8 G/DL (ref 6.4–8.2)
PROTHROMBIN TIME: 13.8 SECONDS (ref 11.6–14.5)
RBC # BLD AUTO: 2.97 MILLION/UL (ref 3.81–5.12)
SODIUM SERPL-SCNC: 137 MMOL/L (ref 136–145)
T3 SERPL-MCNC: 1 NG/ML (ref 0.6–1.8)
T4 FREE SERPL-MCNC: 1.16 NG/DL (ref 0.76–1.46)
TSH SERPL DL<=0.05 MIU/L-ACNC: 1.45 UIU/ML (ref 0.36–3.74)
WBC # BLD AUTO: 6.46 THOUSAND/UL (ref 4.31–10.16)

## 2021-12-10 PROCEDURE — 80053 COMPREHEN METABOLIC PANEL: CPT

## 2021-12-10 PROCEDURE — 84443 ASSAY THYROID STIM HORMONE: CPT

## 2021-12-10 PROCEDURE — 99214 OFFICE O/P EST MOD 30 MIN: CPT | Performed by: NURSE PRACTITIONER

## 2021-12-10 PROCEDURE — 84480 ASSAY TRIIODOTHYRONINE (T3): CPT

## 2021-12-10 PROCEDURE — 85610 PROTHROMBIN TIME: CPT

## 2021-12-10 PROCEDURE — 84439 ASSAY OF FREE THYROXINE: CPT

## 2021-12-10 PROCEDURE — 84100 ASSAY OF PHOSPHORUS: CPT

## 2021-12-10 PROCEDURE — 1036F TOBACCO NON-USER: CPT | Performed by: NURSE PRACTITIONER

## 2021-12-10 PROCEDURE — 85730 THROMBOPLASTIN TIME PARTIAL: CPT

## 2021-12-10 PROCEDURE — 83690 ASSAY OF LIPASE: CPT

## 2021-12-10 PROCEDURE — 82465 ASSAY BLD/SERUM CHOLESTEROL: CPT

## 2021-12-10 PROCEDURE — 82977 ASSAY OF GGT: CPT

## 2021-12-10 PROCEDURE — 82150 ASSAY OF AMYLASE: CPT

## 2021-12-10 PROCEDURE — 83735 ASSAY OF MAGNESIUM: CPT

## 2021-12-10 PROCEDURE — 85025 COMPLETE CBC W/AUTO DIFF WBC: CPT

## 2021-12-10 RX ORDER — ONDANSETRON HYDROCHLORIDE 8 MG/1
8 TABLET, FILM COATED ORAL EVERY 8 HOURS PRN
Qty: 30 TABLET | Refills: 1 | Status: SHIPPED | OUTPATIENT
Start: 2021-12-10

## 2021-12-11 ENCOUNTER — APPOINTMENT (OUTPATIENT)
Dept: LAB | Facility: CLINIC | Age: 66
End: 2021-12-11
Payer: COMMERCIAL

## 2021-12-11 LAB
BACTERIA UR QL AUTO: ABNORMAL /HPF
BILIRUB UR QL STRIP: NEGATIVE
CLARITY UR: CLEAR
COLOR UR: ABNORMAL
CREAT UR-MCNC: 19 MG/DL
GLUCOSE UR STRIP-MCNC: NEGATIVE MG/DL
HGB UR QL STRIP.AUTO: ABNORMAL
KETONES UR STRIP-MCNC: NEGATIVE MG/DL
LEUKOCYTE ESTERASE UR QL STRIP: ABNORMAL
NITRITE UR QL STRIP: NEGATIVE
NON-SQ EPI CELLS URNS QL MICRO: ABNORMAL /HPF
PH UR STRIP.AUTO: 6 [PH]
PROT UR STRIP-MCNC: NEGATIVE MG/DL
PROT UR-MCNC: 24 MG/DL
PROT/CREAT UR: 1.26 MG/G{CREAT} (ref 0–0.1)
RBC #/AREA URNS AUTO: ABNORMAL /HPF
SP GR UR STRIP.AUTO: <=1.005 (ref 1–1.03)
UROBILINOGEN UR QL STRIP.AUTO: 0.2 E.U./DL
WBC #/AREA URNS AUTO: ABNORMAL /HPF

## 2021-12-11 PROCEDURE — 81001 URINALYSIS AUTO W/SCOPE: CPT

## 2021-12-11 PROCEDURE — 84156 ASSAY OF PROTEIN URINE: CPT

## 2021-12-11 PROCEDURE — 82570 ASSAY OF URINE CREATININE: CPT

## 2021-12-13 ENCOUNTER — DOCUMENTATION (OUTPATIENT)
Dept: OTHER | Facility: HOSPITAL | Age: 66
End: 2021-12-13

## 2021-12-13 ENCOUNTER — HOSPITAL ENCOUNTER (OUTPATIENT)
Dept: INFUSION CENTER | Facility: CLINIC | Age: 66
Discharge: HOME | End: 2021-12-13
Payer: COMMERCIAL

## 2021-12-13 VITALS
WEIGHT: 140.5 LBS | OXYGEN SATURATION: 98 % | RESPIRATION RATE: 16 BRPM | HEART RATE: 61 BPM | SYSTOLIC BLOOD PRESSURE: 108 MMHG | TEMPERATURE: 97.1 F | BODY MASS INDEX: 29.49 KG/M2 | HEIGHT: 58 IN | DIASTOLIC BLOOD PRESSURE: 64 MMHG

## 2021-12-13 PROCEDURE — 96413 CHEMO IV INFUSION 1 HR: CPT

## 2021-12-13 PROCEDURE — 96417 CHEMO IV INFUS EACH ADDL SEQ: CPT

## 2021-12-13 PROCEDURE — J9999Q0 INV ATEZOLIZUMAB 1200MG/20ML 20 ML: Performed by: NURSE PRACTITIONER

## 2021-12-13 PROCEDURE — J9035Q0 INV BEVACIZUMAB 400MG/16ML 16 ML: Performed by: NURSE PRACTITIONER

## 2021-12-13 RX ORDER — SODIUM CHLORIDE 9 MG/ML
20 INJECTION, SOLUTION INTRAVENOUS CONTINUOUS
Status: DISCONTINUED | OUTPATIENT
Start: 2021-12-13 | End: 2021-12-16 | Stop reason: HOSPADM

## 2021-12-13 RX ADMIN — Medication 1200 MG: at 09:38

## 2021-12-13 RX ADMIN — SODIUM CHLORIDE 20 ML/HR: 0.9 INJECTION, SOLUTION INTRAVENOUS at 09:20

## 2021-12-13 RX ADMIN — Medication 978 MG: at 10:21

## 2021-12-28 ENCOUNTER — NUTRITION (OUTPATIENT)
Dept: NUTRITION | Facility: CLINIC | Age: 66
End: 2021-12-28

## 2021-12-28 DIAGNOSIS — Z71.3 NUTRITIONAL COUNSELING: Primary | ICD-10-CM

## 2021-12-29 DIAGNOSIS — C54.1 ENDOMETRIAL CANCER (HCC): Primary | ICD-10-CM

## 2021-12-30 ENCOUNTER — DOCUMENTATION (OUTPATIENT)
Dept: OTHER | Facility: HOSPITAL | Age: 66
End: 2021-12-30

## 2021-12-30 ENCOUNTER — HOSPITAL ENCOUNTER (OUTPATIENT)
Dept: INFUSION CENTER | Facility: HOSPITAL | Age: 66
Discharge: HOME | End: 2021-12-30
Payer: COMMERCIAL

## 2021-12-30 ENCOUNTER — OFFICE VISIT (OUTPATIENT)
Dept: GYNECOLOGIC ONCOLOGY | Facility: CLINIC | Age: 66
End: 2021-12-30
Payer: COMMERCIAL

## 2021-12-30 VITALS
HEIGHT: 58 IN | WEIGHT: 135 LBS | SYSTOLIC BLOOD PRESSURE: 104 MMHG | DIASTOLIC BLOOD PRESSURE: 68 MMHG | BODY MASS INDEX: 28.34 KG/M2 | HEART RATE: 65 BPM | RESPIRATION RATE: 17 BRPM | TEMPERATURE: 98 F | OXYGEN SATURATION: 99 %

## 2021-12-30 DIAGNOSIS — D70.1 CHEMOTHERAPY INDUCED NEUTROPENIA (HCC): ICD-10-CM

## 2021-12-30 DIAGNOSIS — C54.1 ENDOMETRIAL CANCER (HCC): Primary | Chronic | ICD-10-CM

## 2021-12-30 DIAGNOSIS — K59.03 DRUG INDUCED CONSTIPATION: ICD-10-CM

## 2021-12-30 DIAGNOSIS — E83.42 HYPOMAGNESEMIA: ICD-10-CM

## 2021-12-30 DIAGNOSIS — E44.0 MODERATE PROTEIN-CALORIE MALNUTRITION (HCC): ICD-10-CM

## 2021-12-30 DIAGNOSIS — C54.1 ENDOMETRIAL CANCER (HCC): Primary | ICD-10-CM

## 2021-12-30 DIAGNOSIS — M80.00XS OSTEOPOROSIS WITH CURRENT PATHOLOGICAL FRACTURE, UNSPECIFIED OSTEOPOROSIS TYPE, SEQUELA: ICD-10-CM

## 2021-12-30 DIAGNOSIS — I82.4Y1 ACUTE DEEP VEIN THROMBOSIS (DVT) OF PROXIMAL VEIN OF RIGHT LOWER EXTREMITY (HCC): ICD-10-CM

## 2021-12-30 DIAGNOSIS — T45.1X5A CHEMOTHERAPY INDUCED NEUTROPENIA (HCC): ICD-10-CM

## 2021-12-30 PROBLEM — E66.813 CLASS 3 SEVERE OBESITY WITH BODY MASS INDEX (BMI) OF 45.0 TO 49.9 IN ADULT (HCC): Status: RESOLVED | Noted: 2017-12-19 | Resolved: 2021-12-30

## 2021-12-30 PROBLEM — E66.01 CLASS 3 SEVERE OBESITY WITH BODY MASS INDEX (BMI) OF 45.0 TO 49.9 IN ADULT (HCC): Status: RESOLVED | Noted: 2017-12-19 | Resolved: 2021-12-30

## 2021-12-30 LAB
ALBUMIN SERPL BCP-MCNC: 3.9 G/DL (ref 3.5–5)
ALP SERPL-CCNC: 142 U/L (ref 46–116)
ALT SERPL W P-5'-P-CCNC: 55 U/L (ref 12–78)
AMYLASE SERPL-CCNC: 36 IU/L (ref 25–115)
ANION GAP SERPL CALCULATED.3IONS-SCNC: 6 MMOL/L (ref 4–13)
APTT PPP: 65 SECONDS (ref 23–37)
AST SERPL W P-5'-P-CCNC: 50 U/L (ref 5–45)
BASOPHILS # BLD AUTO: 0.06 THOUSANDS/ΜL (ref 0–0.1)
BASOPHILS NFR BLD AUTO: 1 % (ref 0–1)
BILIRUB SERPL-MCNC: 0.51 MG/DL (ref 0.2–1)
BUN SERPL-MCNC: 24 MG/DL (ref 5–25)
CALCIUM SERPL-MCNC: 10.1 MG/DL (ref 8.3–10.1)
CHLORIDE SERPL-SCNC: 106 MMOL/L (ref 100–108)
CHOLEST SERPL-MCNC: 217 MG/DL
CO2 SERPL-SCNC: 22 MMOL/L (ref 21–32)
CREAT SERPL-MCNC: 1.23 MG/DL (ref 0.6–1.3)
EOSINOPHIL # BLD AUTO: 0.04 THOUSAND/ΜL (ref 0–0.61)
EOSINOPHIL NFR BLD AUTO: 1 % (ref 0–6)
ERYTHROCYTE [DISTWIDTH] IN BLOOD BY AUTOMATED COUNT: 13.9 % (ref 11.6–15.1)
GFR SERPL CREATININE-BSD FRML MDRD: 46 ML/MIN/1.73SQ M
GGT SERPL-CCNC: 11 U/L (ref 5–85)
GLUCOSE SERPL-MCNC: 93 MG/DL (ref 65–140)
HCT VFR BLD AUTO: 31.7 % (ref 34.8–46.1)
HGB BLD-MCNC: 10.4 G/DL (ref 11.5–15.4)
IMM GRANULOCYTES # BLD AUTO: 0.06 THOUSAND/UL (ref 0–0.2)
IMM GRANULOCYTES NFR BLD AUTO: 1 % (ref 0–2)
INR PPP: 1.18 (ref 0.84–1.19)
LIPASE SERPL-CCNC: 150 U/L (ref 73–393)
LYMPHOCYTES # BLD AUTO: 0.8 THOUSANDS/ΜL (ref 0.6–4.47)
LYMPHOCYTES NFR BLD AUTO: 13 % (ref 14–44)
MAGNESIUM SERPL-MCNC: 2.1 MG/DL (ref 1.6–2.6)
MCH RBC QN AUTO: 32.3 PG (ref 26.8–34.3)
MCHC RBC AUTO-ENTMCNC: 32.8 G/DL (ref 31.4–37.4)
MCV RBC AUTO: 98 FL (ref 82–98)
MONOCYTES # BLD AUTO: 0.72 THOUSAND/ΜL (ref 0.17–1.22)
MONOCYTES NFR BLD AUTO: 11 % (ref 4–12)
NEUTROPHILS # BLD AUTO: 4.61 THOUSANDS/ΜL (ref 1.85–7.62)
NEUTS SEG NFR BLD AUTO: 73 % (ref 43–75)
NRBC BLD AUTO-RTO: 0 /100 WBCS
PHOSPHATE SERPL-MCNC: 3.8 MG/DL (ref 2.3–4.1)
PLATELET # BLD AUTO: 285 THOUSANDS/UL (ref 149–390)
PMV BLD AUTO: 10.7 FL (ref 8.9–12.7)
POTASSIUM SERPL-SCNC: 4.1 MMOL/L (ref 3.5–5.3)
PROT SERPL-MCNC: 8.2 G/DL (ref 6.4–8.2)
PROTHROMBIN TIME: 14.5 SECONDS (ref 11.6–14.5)
RBC # BLD AUTO: 3.22 MILLION/UL (ref 3.81–5.12)
SODIUM SERPL-SCNC: 134 MMOL/L (ref 136–145)
T3 SERPL-MCNC: 1 NG/ML (ref 0.6–1.8)
T4 FREE SERPL-MCNC: 1.39 NG/DL (ref 0.76–1.46)
TSH SERPL DL<=0.05 MIU/L-ACNC: 1.52 UIU/ML (ref 0.36–3.74)
WBC # BLD AUTO: 6.29 THOUSAND/UL (ref 4.31–10.16)

## 2021-12-30 PROCEDURE — 82465 ASSAY BLD/SERUM CHOLESTEROL: CPT

## 2021-12-30 PROCEDURE — 99214 OFFICE O/P EST MOD 30 MIN: CPT | Performed by: NURSE PRACTITIONER

## 2021-12-30 PROCEDURE — 85730 THROMBOPLASTIN TIME PARTIAL: CPT

## 2021-12-30 PROCEDURE — 84439 ASSAY OF FREE THYROXINE: CPT

## 2021-12-30 PROCEDURE — 82150 ASSAY OF AMYLASE: CPT

## 2021-12-30 PROCEDURE — 85610 PROTHROMBIN TIME: CPT

## 2021-12-30 PROCEDURE — 3008F BODY MASS INDEX DOCD: CPT | Performed by: NURSE PRACTITIONER

## 2021-12-30 PROCEDURE — 84480 ASSAY TRIIODOTHYRONINE (T3): CPT

## 2021-12-30 PROCEDURE — 82977 ASSAY OF GGT: CPT

## 2021-12-30 PROCEDURE — 83735 ASSAY OF MAGNESIUM: CPT

## 2021-12-30 PROCEDURE — 80053 COMPREHEN METABOLIC PANEL: CPT

## 2021-12-30 PROCEDURE — 84100 ASSAY OF PHOSPHORUS: CPT

## 2021-12-30 PROCEDURE — 85025 COMPLETE CBC W/AUTO DIFF WBC: CPT

## 2021-12-30 PROCEDURE — 83690 ASSAY OF LIPASE: CPT

## 2021-12-30 PROCEDURE — 84443 ASSAY THYROID STIM HORMONE: CPT

## 2021-12-30 RX ORDER — SODIUM CHLORIDE 9 MG/ML
20 INJECTION, SOLUTION INTRAVENOUS CONTINUOUS
Status: DISCONTINUED | OUTPATIENT
Start: 2022-01-03 | End: 2022-01-04 | Stop reason: HOSPADM

## 2021-12-31 ENCOUNTER — APPOINTMENT (OUTPATIENT)
Dept: LAB | Facility: HOSPITAL | Age: 66
End: 2021-12-31
Payer: COMMERCIAL

## 2021-12-31 LAB
BACTERIA UR QL AUTO: ABNORMAL /HPF
BILIRUB UR QL STRIP: NEGATIVE
CLARITY UR: ABNORMAL
COLOR UR: YELLOW
CREAT UR-MCNC: 35.8 MG/DL
GLUCOSE UR STRIP-MCNC: NEGATIVE MG/DL
HGB UR QL STRIP.AUTO: ABNORMAL
KETONES UR STRIP-MCNC: NEGATIVE MG/DL
LEUKOCYTE ESTERASE UR QL STRIP: ABNORMAL
NITRITE UR QL STRIP: POSITIVE
NON-SQ EPI CELLS URNS QL MICRO: ABNORMAL /HPF
PH UR STRIP.AUTO: 6 [PH]
PROT UR STRIP-MCNC: ABNORMAL MG/DL
PROT UR-MCNC: 40 MG/DL
PROT/CREAT UR: 1.12 MG/G{CREAT} (ref 0–0.1)
RBC #/AREA URNS AUTO: ABNORMAL /HPF
SP GR UR STRIP.AUTO: 1.01 (ref 1–1.03)
UROBILINOGEN UR QL STRIP.AUTO: 1 E.U./DL
WBC #/AREA URNS AUTO: ABNORMAL /HPF

## 2021-12-31 PROCEDURE — 84156 ASSAY OF PROTEIN URINE: CPT

## 2021-12-31 PROCEDURE — 82570 ASSAY OF URINE CREATININE: CPT

## 2021-12-31 PROCEDURE — 81001 URINALYSIS AUTO W/SCOPE: CPT

## 2022-01-03 ENCOUNTER — DOCUMENTATION (OUTPATIENT)
Dept: OTHER | Facility: HOSPITAL | Age: 67
End: 2022-01-03

## 2022-01-03 ENCOUNTER — HOSPITAL ENCOUNTER (OUTPATIENT)
Dept: INFUSION CENTER | Facility: CLINIC | Age: 67
Discharge: HOME | End: 2022-01-03
Payer: COMMERCIAL

## 2022-01-03 VITALS
TEMPERATURE: 98 F | DIASTOLIC BLOOD PRESSURE: 72 MMHG | HEART RATE: 68 BPM | HEIGHT: 58 IN | RESPIRATION RATE: 20 BRPM | SYSTOLIC BLOOD PRESSURE: 108 MMHG | WEIGHT: 134.5 LBS | BODY MASS INDEX: 28.23 KG/M2 | OXYGEN SATURATION: 98 %

## 2022-01-03 PROCEDURE — 96417 CHEMO IV INFUS EACH ADDL SEQ: CPT

## 2022-01-03 PROCEDURE — 96413 CHEMO IV INFUSION 1 HR: CPT

## 2022-01-03 PROCEDURE — J9999Q0 INV ATEZOLIZUMAB 1200MG/20ML 20 ML: Performed by: OBSTETRICS & GYNECOLOGY

## 2022-01-03 PROCEDURE — J9035Q0 INV BEVACIZUMAB 400MG/16ML 16 ML: Performed by: OBSTETRICS & GYNECOLOGY

## 2022-01-03 RX ADMIN — SODIUM CHLORIDE 20 ML/HR: 0.9 INJECTION, SOLUTION INTRAVENOUS at 08:17

## 2022-01-03 RX ADMIN — Medication 1200 MG: at 08:50

## 2022-01-03 RX ADMIN — Medication 978 MG: at 09:30

## 2022-01-03 NOTE — PROGRESS NOTES
Patient was seen in [facility] for [address] [facility] 1 treatment  Patient was given Cycle 19 Rucaparib pills and drug diary, and patient returned Cycle 18 study pills, and diary   Patient denied any changes to AE'S and conmeds since office visit on 12/30/21  Patient states she is still experiencing constipation, CRN discussed her drinking plenty of fluids as well as her bowl regimen  She still experiencing lack of appetite as well, but will be following up with her bariatric dietician soon   Patient was told to call with any questions or concerns, Patient tolerates treatment without incident

## 2022-01-03 NOTE — PROGRESS NOTES
Patient tolerated treatment today without incident  Port remains with excellent blood return, port flushed without resistance  Port deaccessed without issue, bandaid in place  Patient aware of next appt, declines AVS  Patient AAox4 and ambulatory with cane upon DC

## 2022-01-03 NOTE — PROGRESS NOTES
Patient arrives to infusion center for D1 C19 Atezolizumab and Bevacizumab  Patient offers no acute complaints today  Verified that 12/30/21 labs were reviewed by Shira Chavez and patient is OK for treatment today  Noted that UA from 12/31/21 - patient nitrite positive and leukocytes moderate, but patient denies any urinary S/S  Denies pain/burning with urination  Weight within 10%, BP within parameters for treatment today  Nayeli RN from clinical trials discussed with patient today - weight rending down, patient reported lack of appetite/constipation  Nayeli RN reinforced to patient to eat small meals and continue bowel regimen  L PAC accessed without issue, brisk excellent blood return noted, port flushed well without resistance  Patient resting on recliner chair, call bell within reach

## 2022-01-06 ENCOUNTER — RA CDI HCC (OUTPATIENT)
Dept: OTHER | Facility: HOSPITAL | Age: 67
End: 2022-01-06

## 2022-01-06 DIAGNOSIS — T45.1X5A CHEMOTHERAPY-INDUCED NAUSEA: ICD-10-CM

## 2022-01-06 DIAGNOSIS — R64 CACHEXIA (HCC): ICD-10-CM

## 2022-01-06 DIAGNOSIS — R63.0 ANOREXIA: ICD-10-CM

## 2022-01-06 DIAGNOSIS — R11.0 CHEMOTHERAPY-INDUCED NAUSEA: ICD-10-CM

## 2022-01-06 RX ORDER — DRONABINOL 2.5 MG/1
2.5 CAPSULE ORAL
Qty: 60 CAPSULE | Refills: 0 | Status: SHIPPED | OUTPATIENT
Start: 2022-01-06 | End: 2022-01-21 | Stop reason: SDUPTHER

## 2022-01-06 NOTE — TELEPHONE ENCOUNTER
Primary palliative medicine provider:Dr Prashant Mora     Medication requested:Dronabinol    If for pain, how has the patient been taking their pain medicine?      Last appointment:    Next scheduled appointment:1/21/22    PDMP review:n/a

## 2022-01-06 NOTE — PROGRESS NOTES
[facility] [address]  coding opportunities          Number of diagnosis code(s) already on the problem list added to FYI fla     Chart Reviewed * (Number of) Inbasket suggestions sent to Provider: 2                  Patients insurance company: [employer] (Medicare and [employer] for [facility] [employer] and [address] [place] [address])     Visit status: Patient canceled the appointment     Provider never responded to [facility]  coding request     [facility] [address]  coding opportunities          Number of diagnosis code(s) already on the problem list added to FY fla     Chart Reviewed * (Number of) Inbasket suggestions sent to Provider: 2                  Patients insurance company: [employer] (Medicare and Commercial for [place] [employer] and [facility])           Based on clinical documentation indicated in your record, it appears that the patient may have the following conditions:    F11 20 Opioid dependence, uncomplicated     Could the depression noted be further classified to one of the following codes?     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

## 2022-01-11 ENCOUNTER — OFFICE VISIT (OUTPATIENT)
Dept: UROLOGY | Facility: AMBULATORY SURGERY CENTER | Age: 67
End: 2022-01-11
Payer: COMMERCIAL

## 2022-01-11 VITALS
BODY MASS INDEX: 27.92 KG/M2 | HEIGHT: 58 IN | OXYGEN SATURATION: 99 % | RESPIRATION RATE: 18 BRPM | WEIGHT: 133 LBS | HEART RATE: 80 BPM

## 2022-01-11 DIAGNOSIS — N13.30 HYDRONEPHROSIS, UNSPECIFIED HYDRONEPHROSIS TYPE: Primary | ICD-10-CM

## 2022-01-11 PROCEDURE — 99213 OFFICE O/P EST LOW 20 MIN: CPT | Performed by: NURSE PRACTITIONER

## 2022-01-11 NOTE — PROGRESS NOTES
Addendum:  Cultures reviewed at 1:15 PM and Klebsiella may be carbapenemase   Interestingly improved on cefepime which should not be active  Regardless, [facility]  Follow up final susceptibilities and tailor antibiotics as appropropriate  Not stable for D/C until final cultures available  Please tiger text me directly over the weekend to discuss final antibiotic choice once susceptibilities back  The above plan was discussed in detail with the primary service  Progress Note - Infectious Disease   Tamie Funes Pulcini 60 y o  female MRN: [id number]  [facility]: [id number]      Impression/Recommendations:  1   Sepsis, POA   Fever and tachycardia   Due to # 2/3   No other appreciable source   ROS and exam otherwise benign   CXR negative   Hemodynamically stable  Improved  Afebrile >24 hours  Rec:  ? Continue antibiotics as below  ? Follow temperatures closely  ? Supportive care as per the primary service     2   Klebsiella bacteremia   Due to #3   No other appreciable source  LFTs normal, recent CT A/P negative for other intraabdominal source   ROS otherwise negative  Rec:  ? Continue cefepime for now  ? Follow up susceptibilities and tailor antibiotics to PO with plan to complete 10 days total of antibiotics through 1/17/20     3   Right Klebsiella pyelonephritis   In setting of recent PCN exchange x2   No pain currently and having good UOP  Rec:  ? Continue antibiotics as above  ? Follow UOP closely  ? Follow  symptoms closely     4   Chronic right ureteral obstruction   Due to # 5  Status post chronic PCN   Recent routine exchange, then dislodgement requiring replacement        5   Recurrent endometrial cancer   With extensive right pelvic sidewall involvement   Currently on palliative chemotherapy, last dose 1/6/20 with Neupogen  6   Diarrhea  Suspect combination of antibiotic induced, recent chemo, recent stool softeners in setting of constipation  Low clinical suspicion for C diff  Rec:  · D/C stool softeners  · Narrow antibiotics as above once cultures finalized  · Would not send C  Diff at this point unless liquid stool persists/worsens over next 24 hours     The patient is stable from an ID standpoint for D/C later today or tomorrow depending on clinical course today      Antibiotics:  Cefepime #3    Subjective:  Patient seen on AM rounds  Had several watery stools but did get colace x2 for constipation on 20  Denies fevers, chills, sweats, nausea, vomiting  Continues to have good UOP into PCN  24 Hour Events:  No documented fevers, chills, sweats, nausea, vomiting, or diarrhea  Objective:  Vitals:  Temp:  [98 1 °F (36 7 °C)-99 5 °F (37 5 °C)] 98 1 °F (36 7 °C)  HR:  [82-98] 83  Resp:  [16-22] 16  BP: (122-139)/(50-70) 126/69  SpO2:  [97 %-100 %] 97 %  Temp (24hrs), Av 8 °F (37 1 °C), Min:98 1 °F (36 7 °C), Max:99 5 °F (37 5 °C)  Current: Temperature: 98 1 °F (36 7 °C)    Physical Exam:   General:  No acute distress  Psychiatric:  Awake and alert  Pulmonary:  Normal respiratory excursion without accessory muscle use  Abdomen:  Soft, nontender  Extremities:  No edema  Skin:  No rashes    Lab Results:  I have personally reviewed pertinent labs    Results from last 7 days   Lab Units 01/10/20  0617 20  0539 20  0416 20  2231 20  0955   POTASSIUM mmol/L 3 8 3 3* 3 7 4 2 5 2 4 4   CHLORIDE mmol/L 109* 109* 107 105 112* 105   CO2 mmol/L 18* 19* 21 21 24 26   BUN mg/dL 17 20 22 24 27* 23   CREATININE mg/dL 0 93 1 06 1 15 1 26 0 90 1 23   EGFR ml/min/1 73sq m 66 56 51 46 69 47   CALCIUM mg/dL 8 9 8 6 8 4 8 7 9 1 9 3   AST U/L  --   --   --  21 37 10   ALT U/L  --   --   --  20 21 19   ALK PHOS U/L  --   --   --  85 85 92     Results from last 7 days   Lab Units 01/10/20  0617 20  0539 20  0416   WBC Thousand/uL 5 50 8 06 12 73*   HEMOGLOBIN g/dL 8 1* 8 8* 8 5*   PLATELETS Thousands/uL 113* 163 223 Results from last 7 days   Lab Units 01/07/20 2105 01/07/20 2036 01/07/20 2016   BLOOD CULTURE   --  Klebsiella pneumoniae* Klebsiella pneumoniae*   GRAM STAIN RESULT   --  Gram negative rods* Gram negative rods*   URINE CULTURE  >100,000 cfu/ml Klebsiella pneumoniae*  --   --        Imaging Studies:   I have personally reviewed pertinent imaging study reports and images in PACS  EKG, Pathology, and Other Studies:   I have personally reviewed pertinent reports  [Subsequent Evaluation] : a subsequent evaluation for [FreeTextEntry2] : 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

## 2022-01-11 NOTE — PROGRESS NOTES
1/11/2022      No chief complaint on file  Assessment and Plan    61 y o  female managed by Dr Johnson [facility]  1  Hydronephrosis secondary to right ureteral obstruction  · Secondary to endometrial cancer  · Status post insertion of for ostomy tube-exchanged in Interventional Radiology 12/7/2021   · Next exchange due 3/2022  · Nephrostomy tube insertion site unremarkable    2  Atrophic vaginitis  · Continue with Ob/gyn follow-up  · Discontinued Estrace per Oncology and will continue with Replens and Aquaphor      3  Overactive bladder symptoms  · Continue with oxybutynin-prescription refill not needed at this time   · Myrbetriq discontinued due to continued genital pain  Pt reports since discontinuing, pain has resolved  · Maintain adequate hydration upwards to 40 oz of water intake per day while avoiding bladder irritating foods and beverages  · Ensure complete bladder emptying with urination  · Timed voiding  · No Caffeinated beverages after 3:00 p m  · Patient is aware to call the office for worsening symptoms or concerns  · Follow up in the office in 6 months        History of Present Illness  Juan Jo is a 61 y o  female here for follow up evaluation of  hydronephrosis secondary to endometrial cancer  She is undergoing chemotherapy and clinical trials for advancement of disease  She has a nephrostomy tube most recently exchanged by intervention Radiology 12/7/21  She denies complications with percutaneous nephrostomy tube           Review of Systems   Constitutional: Negative for chills and fever  Respiratory: Negative for cough and shortness of breath  Cardiovascular: Negative for chest pain  Gastrointestinal: Negative for abdominal distention, abdominal pain, blood in stool, nausea and vomiting  Genitourinary: Negative for difficulty urinating, dysuria, enuresis, flank pain, frequency, hematuria and urgency  Musculoskeletal: Negative for back pain  Skin: Negative for rash  Neurological: Negative for dizziness       Past Medical History  Past Medical History:   Diagnosis Date    Anemia     Chemotherapy follow-up examination     Depression     Endometrial cancer ([facility] [address] ) 12/2017    Hyperglycemia     vx type 2 dm -- last assessed 4/1/14; resolved 11/7/17    Hyperlipidemia     Hypertension     Obesity     last assessed 10/14/17; resolved 11/7/17       Past Social History  Past Surgical History:   Procedure Laterality Date    ABDOMINAL SURGERY      GASTRIC BYPASS    CHOLECYSTECTOMY      at the time of gastric bypass    COLONOSCOPY      CT NEEDLE BIOPSY LYMPH NODE  7/8/2019    FL GUIDED CENTRAL VENOUS ACCESS DEVICE INSERTION  11/12/2019    GASTRIC BYPASS      HYSTERECTOMY Bilateral     total abdominal with salpingo-oophorectomy    IR NEPHROSTOMY TUBE PLACEMENT  7/26/2019    IR NEPHROURETERAL STENT CHECK/CHANGE/REPOSITION  4/6/2021    IR NEPHROURETERAL STENT CHECK/CHANGE/REPOSITION  6/4/2021    IR NEPHROURETERAL STENT CHECK/CHANGE/REPOSITION  9/3/2021    IR NEPHROURETERAL STENT CHECK/CHANGE/REPOSITION  12/7/2021    IR PICC LINE  9/27/2019    IR PORT PLACEMENT  7/26/2019    IR PORT REMOVAL  9/20/2019    OOPHORECTOMY Bilateral     FL INSJ TUNNELED CTR VAD W/SUBQ PORT AGE 5 YR/> Left 11/12/2019    Procedure: INSERTION VENOUS PORT ( PORT-A-CATH) IR;  Surgeon: Rolanda Rankin DO;  [facility];  Service: Interventional Radiology    FL LAP, RADICAL HYST W/ TUBE&OV, NODE BX N/A 12/19/2017    Procedure: HYSTERECTOMY LAPAROSCOPIC TOTAL ([address]) W/ ROBOTICS; BILATERAL SALPINGOOPHERECTOMY; LYMPH NODE DISSECTION; lysis of adhesions;  Surgeon: Fariba Jaimes MD;  [facility];  Service: Gynecology Oncology    FL LAP,DIAGNOSTIC ABDOMEN N/A 12/19/2017    Procedure: LAPAROSCOPY DIAGNOSTIC;  Surgeon: Fariba Jaimes MD;  [facility];  Service: Gynecology Oncology    TONSILLECTOMY      US GUIDED BREAST BIOPSY RIGHT COMPLETE Right 6/28/2019     Social History     Tobacco Use   Smoking Status Never Smoker   Smokeless Tobacco Never Used       Past Family History  Family History   Problem Relation Age of Onset    Other Mother         dyslipidemia    Ovarian cancer Mother 48    Lymphoma Father     Bone cancer Maternal Grandfather     No Known Problems Sister     No Known Problems Maternal Grandmother     Uterine cancer Paternal Grandmother     No Known Problems Paternal Grandfather     No Known Problems Maternal Aunt     No Known Problems Maternal Aunt     No Known Problems Maternal Aunt     No Known Problems Maternal Aunt     No Known Problems Paternal Aunt     No Known Problems Paternal Aunt     No Known Problems Paternal Aunt     No Known Problems Paternal Aunt     No Known Problems Brother     No Known Problems Brother        Past Social history  Social History     Socioeconomic History    Marital status: Single     Spouse name: Not on file    Number of children: Not on file    Years of education: Not on file    Highest education level: Not on file   Occupational History    Not on file   Tobacco Use    Smoking status: Never Smoker    Smokeless tobacco: Never Used   Vaping Use    Vaping Use: Never used   Substance and Sexual Activity    Alcohol use: Not Currently    Drug use: No    Sexual activity: Not Currently   Other Topics Concern    Not on file   Social History Narrative    Not on file     Social Determinants of Health     Financial Resource Strain: Not on file   Food Insecurity: Not on file   Transportation Needs: Not on file   Physical Activity: Not on file   Stress: Not on file   Social Connections: Not on file   Intimate Partner Violence: Not on file   Housing Stability: Not on file       Current Medications  Current Outpatient Medications   Medication Sig Dispense Refill    acetaminophen-codeine (TYLENOL #3) 300-30 mg per tablet Take 1 tablet by mouth every 6 (six) hours as needed for moderate pain 20 tablet 0    ARIPiprazole (ABILIFY) 10 mg tablet Take 10 mg by mouth daily      aspirin (ECOTRIN LOW STRENGTH) 81 mg EC tablet Take 81 mg by mouth daily      Calcium-Magnesium-Vitamin D (CALCIUM 500 PO) Take by mouth daily       cholecalciferol (VITAMIN D3) 1,000 units tablet Take 1,000 Units by mouth daily      ciprofloxacin (CIPRO) 500 mg tablet TAKE 1 TABLET BY MOUTH 1 HOUR PRIOR TO TUBE CHANGE      Cyanocobalamin (VITAMIN B12 PO) Take by mouth daily       dronabinol (MARINOL) 2 5 mg capsule Take 1 capsule (2 5 mg total) by mouth 2 (two) times a day before meals 60 capsule 0    enoxaparin (LOVENOX) 100 mg/mL INJECT 1 ML (100 MG TOTAL) UNDER THE SKIN EVERY 24 HOURS 30 mL 5    folic acid (FOLVITE) 1 mg tablet Take 1 tablet (1 mg total) by mouth daily  0    gemfibrozil (LOPID) 600 mg tablet TAKE 1 TABLET BY MOUTH EVERY DAY 90 tablet 1    lactulose (CHRONULAC) 10 g/15 mL solution TAKE 45 ML (30 G TOTAL) BY MOUTH 2 (TWO) TIMES A DAY (Patient taking differently: as needed TAKE 45 ML (30 G TOTAL) BY MOUTH 2 (TWO) TIMES A DAY) 300 mL 2    LORazepam (ATIVAN) 0 5 mg tablet Take 1 tablet (0 5 mg total) by mouth every 6 (six) hours as needed for anxiety (or nausea) 36 tablet 1    Multiple Vitamin (MULTIVITAMIN) tablet Take 1 tablet by mouth daily      naloxone (NARCAN) 4 mg/0 1 mL nasal spray Administer 1 spray into a nostril  If no response after 2-3 minutes, give another dose in the other nostril using a new spray   1 each 0    omeprazole (PriLOSEC) 20 mg delayed release capsule Take 20 mg by mouth daily      ondansetron (ZOFRAN) 8 mg tablet Take 1 tablet (8 mg total) by mouth every 8 (eight) hours as needed for nausea or vomiting 30 tablet 1    oxybutynin (DITROPAN XL) 15 MG 24 hr tablet TAKE 1 TABLET BY MOUTH DAILY AT BEDTIME 90 tablet 3    phenazopyridine (PYRIDIUM) 200 mg tablet Take 1 tablet (200 mg total) by mouth daily as needed for bladder spasms 30 tablet 0    quinapril (ACCUPRIL) 5 mg tablet TAKE 1 TABLET BY MOUTH EVERYDAY AT BEDTIME 90 tablet 1    rucaparib (RUBRACA) 300 mg tablet Take 600 mg by mouth every 12 (twelve) hours Take with or without food  Do not repeat a vomited dose   saccharomyces boulardii (FLORASTOR) 250 mg capsule Take 1 capsule (250 mg total) by mouth 2 (two) times a day  0    venlafaxine (EFFEXOR) 75 mg tablet Take 75 mg by mouth 3 (three) times a day        [employer] Melatonin 3 MG TAKE 1 TABLET (3 MG TOTAL) BY MOUTH DAILY AT BEDTIME 90 tablet 1    Sodium Chloride Flush (Normal Saline Flush) 0 9 % SOLN  (Patient not taking: Reported on 11/16/2021 )      sodium chloride, PF, 0 9 % 10 mL by Intracatheter route daily for 30 doses 10cc syringes 300 mL 3     No current facility-administered medications for this visit  Allergies  Allergies   Allergen Reactions    Cephalosporins Rash     Which Cephalosporin reaction was to not specified; however, has tolerated Amoxicillin, Cefazolin, and Cefepime         The following portions of the patient's history were reviewed and updated as appropriate: allergies, current medications, past medical history, past social history, past surgical history and problem list       Vitals  Vitals:    01/11/22 1025   Pulse: 80   Resp: 18   SpO2: 99%   Weight: 60 3 kg (133 lb)   Height: 4' 10" (1 473 m)           Physical Exam  Physical Exam  Vitals reviewed  Constitutional:       General: She is not in acute distress  Appearance: Normal appearance  Cardiovascular:      Heart sounds: Normal heart sounds  Pulmonary:      Effort: Pulmonary effort is normal  No respiratory distress  Breath sounds: Normal breath sounds  Genitourinary:     Comments: Right-sided percutaneous nephrostomy tube insertion site unremarkable  Draining clear yellow urine  Musculoskeletal:         General: Normal range of motion  Skin:     General: Skin is warm and dry  Neurological:      General: No focal deficit present  Mental Status: She is alert     Psychiatric: Mood and Affect: Mood normal          Behavior: Behavior normal            Results  No results found for this or any previous visit (from the past 1 hour(s))  ]  No results found for: PSA  Lab Results   Component Value Date    GLUCOSE 219 (H) 12/19/2017    CALCIUM 10 1 12/30/2021     10/27/2017    K 4 1 12/30/2021    CO2 22 12/30/2021     12/30/2021    BUN 24 12/30/2021    CREATININE 1 23 12/30/2021     Lab Results   Component Value Date    WBC 6 29 12/30/2021    HGB 10 4 (L) 12/30/2021    HCT 31 7 (L) 12/30/2021    MCV 98 12/30/2021     12/30/2021     Orders  No orders of the defined types were placed in this encounter        RAFITA Bowens

## 2022-01-12 ENCOUNTER — RA CDI HCC (OUTPATIENT)
Dept: OTHER | Facility: HOSPITAL | Age: 67
End: 2022-01-12

## 2022-01-20 DIAGNOSIS — C54.1 ENDOMETRIAL CANCER (HCC): Primary | ICD-10-CM

## 2022-01-20 NOTE — PROGRESS NOTES
[facility]  coding opportunities                             Patients insurance company: [employer] (Medicare and Commercial for [employer] and [employer])     Visit status: Patient canceled the appointment

## 2022-01-21 ENCOUNTER — HOSPITAL ENCOUNTER (OUTPATIENT)
Dept: INFUSION CENTER | Facility: HOSPITAL | Age: 67
Discharge: HOME | End: 2022-01-21
Payer: COMMERCIAL

## 2022-01-21 ENCOUNTER — DOCUMENTATION (OUTPATIENT)
Dept: OTHER | Facility: HOSPITAL | Age: 67
End: 2022-01-21

## 2022-01-21 ENCOUNTER — OFFICE VISIT (OUTPATIENT)
Dept: PALLIATIVE MEDICINE | Facility: CLINIC | Age: 67
End: 2022-01-21
Payer: COMMERCIAL

## 2022-01-21 ENCOUNTER — OFFICE VISIT (OUTPATIENT)
Dept: GYNECOLOGIC ONCOLOGY | Facility: CLINIC | Age: 67
End: 2022-01-21
Payer: COMMERCIAL

## 2022-01-21 VITALS — TEMPERATURE: 96.9 F

## 2022-01-21 VITALS
HEIGHT: 58 IN | DIASTOLIC BLOOD PRESSURE: 60 MMHG | SYSTOLIC BLOOD PRESSURE: 102 MMHG | RESPIRATION RATE: 16 BRPM | BODY MASS INDEX: 28.34 KG/M2 | WEIGHT: 135 LBS | OXYGEN SATURATION: 94 % | TEMPERATURE: 97.3 F | HEART RATE: 73 BPM

## 2022-01-21 VITALS
OXYGEN SATURATION: 97 % | HEART RATE: 77 BPM | DIASTOLIC BLOOD PRESSURE: 62 MMHG | WEIGHT: 134.92 LBS | BODY MASS INDEX: 28.2 KG/M2 | TEMPERATURE: 97.4 F | SYSTOLIC BLOOD PRESSURE: 100 MMHG

## 2022-01-21 DIAGNOSIS — R63.0 ANOREXIA: ICD-10-CM

## 2022-01-21 DIAGNOSIS — T45.1X5A CHEMOTHERAPY INDUCED NEUTROPENIA (HCC): ICD-10-CM

## 2022-01-21 DIAGNOSIS — C54.1 ENDOMETRIAL CANCER (HCC): Primary | ICD-10-CM

## 2022-01-21 DIAGNOSIS — K59.03 DRUG INDUCED CONSTIPATION: ICD-10-CM

## 2022-01-21 DIAGNOSIS — T45.1X5A CHEMOTHERAPY-INDUCED NAUSEA: ICD-10-CM

## 2022-01-21 DIAGNOSIS — R11.0 CHEMOTHERAPY-INDUCED NAUSEA: ICD-10-CM

## 2022-01-21 DIAGNOSIS — R64 CACHEXIA (HCC): ICD-10-CM

## 2022-01-21 DIAGNOSIS — C54.1 ENDOMETRIAL CANCER (HCC): Primary | Chronic | ICD-10-CM

## 2022-01-21 DIAGNOSIS — E83.42 HYPOMAGNESEMIA: ICD-10-CM

## 2022-01-21 DIAGNOSIS — N13.5 OBSTRUCTION OF RIGHT URETER: ICD-10-CM

## 2022-01-21 DIAGNOSIS — M80.00XS OSTEOPOROSIS WITH CURRENT PATHOLOGICAL FRACTURE, UNSPECIFIED OSTEOPOROSIS TYPE, SEQUELA: ICD-10-CM

## 2022-01-21 DIAGNOSIS — D70.1 CHEMOTHERAPY INDUCED NEUTROPENIA (HCC): ICD-10-CM

## 2022-01-21 DIAGNOSIS — I82.4Y1 ACUTE DEEP VEIN THROMBOSIS (DVT) OF PROXIMAL VEIN OF RIGHT LOWER EXTREMITY (HCC): ICD-10-CM

## 2022-01-21 LAB
ALBUMIN SERPL BCP-MCNC: 3.4 G/DL (ref 3.5–5)
ALP SERPL-CCNC: 119 U/L (ref 46–116)
ALT SERPL W P-5'-P-CCNC: 150 U/L (ref 12–78)
AMYLASE SERPL-CCNC: 37 IU/L (ref 25–115)
ANION GAP SERPL CALCULATED.3IONS-SCNC: 7 MMOL/L (ref 4–13)
APTT PPP: 57 SECONDS (ref 23–37)
AST SERPL W P-5'-P-CCNC: 132 U/L (ref 5–45)
BASOPHILS # BLD AUTO: 0.06 THOUSANDS/ΜL (ref 0–0.1)
BASOPHILS NFR BLD AUTO: 1 % (ref 0–1)
BILIRUB SERPL-MCNC: 0.37 MG/DL (ref 0.2–1)
BUN SERPL-MCNC: 24 MG/DL (ref 5–25)
CALCIUM ALBUM COR SERPL-MCNC: 10 MG/DL (ref 8.3–10.1)
CALCIUM SERPL-MCNC: 9.5 MG/DL (ref 8.3–10.1)
CHLORIDE SERPL-SCNC: 104 MMOL/L (ref 100–108)
CHOLEST SERPL-MCNC: 195 MG/DL
CO2 SERPL-SCNC: 24 MMOL/L (ref 21–32)
CREAT SERPL-MCNC: 1.05 MG/DL (ref 0.6–1.3)
EOSINOPHIL # BLD AUTO: 0.05 THOUSAND/ΜL (ref 0–0.61)
EOSINOPHIL NFR BLD AUTO: 1 % (ref 0–6)
ERYTHROCYTE [DISTWIDTH] IN BLOOD BY AUTOMATED COUNT: 13.6 % (ref 11.6–15.1)
GFR SERPL CREATININE-BSD FRML MDRD: 56 ML/MIN/1.73SQ M
GGT SERPL-CCNC: 14 U/L (ref 5–85)
GLUCOSE SERPL-MCNC: 86 MG/DL (ref 65–140)
HCT VFR BLD AUTO: 30.1 % (ref 34.8–46.1)
HGB BLD-MCNC: 9.7 G/DL (ref 11.5–15.4)
IMM GRANULOCYTES # BLD AUTO: 0.06 THOUSAND/UL (ref 0–0.2)
IMM GRANULOCYTES NFR BLD AUTO: 1 % (ref 0–2)
INR PPP: 1.08 (ref 0.84–1.19)
LIPASE SERPL-CCNC: 116 U/L (ref 73–393)
LYMPHOCYTES # BLD AUTO: 0.61 THOUSANDS/ΜL (ref 0.6–4.47)
LYMPHOCYTES NFR BLD AUTO: 10 % (ref 14–44)
MAGNESIUM SERPL-MCNC: 1.9 MG/DL (ref 1.6–2.6)
MCH RBC QN AUTO: 31.3 PG (ref 26.8–34.3)
MCHC RBC AUTO-ENTMCNC: 32.2 G/DL (ref 31.4–37.4)
MCV RBC AUTO: 97 FL (ref 82–98)
MONOCYTES # BLD AUTO: 0.62 THOUSAND/ΜL (ref 0.17–1.22)
MONOCYTES NFR BLD AUTO: 10 % (ref 4–12)
NEUTROPHILS # BLD AUTO: 4.97 THOUSANDS/ΜL (ref 1.85–7.62)
NEUTS SEG NFR BLD AUTO: 77 % (ref 43–75)
NRBC BLD AUTO-RTO: 0 /100 WBCS
PHOSPHATE SERPL-MCNC: 3.7 MG/DL (ref 2.3–4.1)
PLATELET # BLD AUTO: 231 THOUSANDS/UL (ref 149–390)
PMV BLD AUTO: 11.2 FL (ref 8.9–12.7)
POTASSIUM SERPL-SCNC: 3.6 MMOL/L (ref 3.5–5.3)
PROT SERPL-MCNC: 7.7 G/DL (ref 6.4–8.2)
PROTHROMBIN TIME: 13.6 SECONDS (ref 11.6–14.5)
RBC # BLD AUTO: 3.1 MILLION/UL (ref 3.81–5.12)
SODIUM SERPL-SCNC: 135 MMOL/L (ref 136–145)
T3 SERPL-MCNC: 1.4 NG/ML (ref 0.6–1.8)
T4 FREE SERPL-MCNC: 1.32 NG/DL (ref 0.76–1.46)
TSH SERPL DL<=0.05 MIU/L-ACNC: 1.21 UIU/ML (ref 0.36–3.74)
WBC # BLD AUTO: 6.37 THOUSAND/UL (ref 4.31–10.16)

## 2022-01-21 PROCEDURE — 99214 OFFICE O/P EST MOD 30 MIN: CPT | Performed by: NURSE PRACTITIONER

## 2022-01-21 PROCEDURE — 85025 COMPLETE CBC W/AUTO DIFF WBC: CPT

## 2022-01-21 PROCEDURE — 84443 ASSAY THYROID STIM HORMONE: CPT

## 2022-01-21 PROCEDURE — 1036F TOBACCO NON-USER: CPT | Performed by: STUDENT IN AN ORGANIZED HEALTH CARE EDUCATION/TRAINING PROGRAM

## 2022-01-21 PROCEDURE — 83735 ASSAY OF MAGNESIUM: CPT

## 2022-01-21 PROCEDURE — 82150 ASSAY OF AMYLASE: CPT

## 2022-01-21 PROCEDURE — 84439 ASSAY OF FREE THYROXINE: CPT

## 2022-01-21 PROCEDURE — 84480 ASSAY TRIIODOTHYRONINE (T3): CPT

## 2022-01-21 PROCEDURE — 84100 ASSAY OF PHOSPHORUS: CPT

## 2022-01-21 PROCEDURE — 85610 PROTHROMBIN TIME: CPT

## 2022-01-21 PROCEDURE — 80053 COMPREHEN METABOLIC PANEL: CPT

## 2022-01-21 PROCEDURE — 99214 OFFICE O/P EST MOD 30 MIN: CPT | Performed by: STUDENT IN AN ORGANIZED HEALTH CARE EDUCATION/TRAINING PROGRAM

## 2022-01-21 PROCEDURE — 83690 ASSAY OF LIPASE: CPT

## 2022-01-21 PROCEDURE — 82465 ASSAY BLD/SERUM CHOLESTEROL: CPT

## 2022-01-21 PROCEDURE — 85730 THROMBOPLASTIN TIME PARTIAL: CPT

## 2022-01-21 PROCEDURE — 82977 ASSAY OF GGT: CPT

## 2022-01-21 RX ORDER — SODIUM CHLORIDE 9 MG/ML
20 INJECTION, SOLUTION INTRAVENOUS CONTINUOUS
Status: DISCONTINUED | OUTPATIENT
Start: 2022-01-24 | End: 2022-01-27 | Stop reason: HOSPADM

## 2022-01-21 RX ORDER — DRONABINOL 2.5 MG/1
2.5 CAPSULE ORAL
Qty: 60 CAPSULE | Refills: 0 | Status: SHIPPED | OUTPATIENT
Start: 2022-01-21 | End: 2022-08-02 | Stop reason: SDUPTHER

## 2022-01-21 NOTE — PROGRESS NOTES
Labs have been reviewed and signed by RAFITA Khan on 1/21/2022   Per protocol, patient is ok to proceed with Cycle 20 Day 1 treatment on Monday 1/24/22

## 2022-01-21 NOTE — PROGRESS NOTES
Patient was seen in the office for her Cycle 20 Day 1 pre treatment office visit For [facility] with Harsh Khan  Patient states that she has been feeling better  She is eating better and that has helped greatly with her overall feeling more energy  Constipation is still there but is better than last visit  Patient will see palliative care today as well so that she can get a refill on her Marinol   All AEs and Conmeds were reviewed   Patient denied changes to her conmeds   Future appointments were reviewed  Overall, patient states that she is feeling okay, and was told to call with any questions or concerns

## 2022-01-21 NOTE — TELEPHONE ENCOUNTER
Primary palliative medicine provider:   Dr Lyn Solorio    Medication requested:  Dronabinol    If for pain, how has the patient been taking their pain medicine?      Last appointment:    Next scheduled appointment: today    PDMP review:  Last fill 11/22/21 60/30

## 2022-01-21 NOTE — PROGRESS NOTES
Outpatient Oncology Nutrition Consult  Type of Consult: Follow Up  Care Location: Telephone Call   Nutrition Assessment:  Oncology Diagnosis & Treatments:   · Endometrial cancer  · S/p surgery 12/19/17  · Recurrence 7/8/19  · S/p chemotherapy (carboplatin/taxol from 7/30/19-1/6/20)  · S/p whole pelvis RT (2/4/20- 4/13/20)  · Disease progression  · ENDOBAR trial started 12/21/20    Oncology History   Endometrial cancer ([facility] [address] )   11/17/2017 Initial Diagnosis    Endometrial cancer ([facility] [address] )     11/17/2017 Biopsy    ENDOMETRIAL BIOPSY: WELL DIFFERENTIATED ENDOMETRIAL ADENOCARCINOMA (FIGO I) WITH FOCALMUCINOUS FEATURES  Part B: ENDOCERVICAL POLYP:BENIGN ENDOCERVICAL      12/19/2017 Surgery    Robotic assisted total laparoscopic hysterectomy with bilateral salpingo-oophorectomy and sentinel bilateral pelvic lymph node dissection  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 (4 4 x 3 2 cm tumor, 9 4/15 4mm invasion, NO LVSI, washings revealed atypical cellular changes)     12/19/2017 Genetic Testing    Morrison testing negative     6/28/2019 Biopsy    A  Breast, Right, US BX Right Breast 1000 4 cmfn:  - Benign breast tissue with focal histiocytic aggregate  See comment   - Negative for atypia and in-situ or invasive carcinoma  7/8/2019 Recurrence    Presented with right lower extremity DVT and CT demonstrating right pelvic sidewall mass with venous, ureteral and nerve compression causing significant neuropathic pain  Biopsy:  Lymph Node, Right pelvic lymph node x3:  - High-grade adenocarcinoma  7/30/2019 - 1/6/2020 Chemotherapy    Taxol 175 mg/m2 and carboplatin AUC 6 every 21 days  Dose was reduced to taxol 135 mg/m2 and carboplatin AUC 5  Completed 6 cycles  Treatment protracted due to multiple hospital admissions       2/24/2020 - 4/13/2020 Radiation    adjuvant external beam radiation therapy to the whole pelvis to 4500 cGy followed by boost to gross disease of an additional 2200 cGy 11/23/2020 Progression    New necrotic adenopathy in the retroperitoneum on CT      12/21/2020 -  Chemotherapy    Began chemotherapy with rucaparib, atezolizumab, and bevacizumab as per [facility] clinical trial        PMH: RNYGB (2001 at [address])    Past Medical History:   Diagnosis Date    Anemia     Chemotherapy follow-up examination     Depression     Endometrial cancer ([address] ) 12/2017    Hyperglycemia     vx type 2 dm -- last assessed 4/1/14; resolved 11/7/17    Hyperlipidemia     Hypertension     Obesity     last assessed 10/14/17; resolved 11/7/17     Past Surgical History:   Procedure Laterality Date    ABDOMINAL SURGERY      GASTRIC BYPASS    CHOLECYSTECTOMY      at the time of gastric bypass    COLONOSCOPY      CT NEEDLE BIOPSY LYMPH NODE  7/8/2019    FL GUIDED CENTRAL VENOUS ACCESS DEVICE INSERTION  11/12/2019    GASTRIC BYPASS      HYSTERECTOMY Bilateral     total abdominal with salpingo-oophorectomy    IR NEPHROSTOMY TUBE PLACEMENT  7/26/2019    IR NEPHROURETERAL STENT CHECK/CHANGE/REPOSITION  4/6/2021    IR NEPHROURETERAL STENT CHECK/CHANGE/REPOSITION  6/4/2021    IR NEPHROURETERAL STENT CHECK/CHANGE/REPOSITION  9/3/2021    IR NEPHROURETERAL STENT CHECK/CHANGE/REPOSITION  12/7/2021    IR PICC LINE  9/27/2019    IR PORT PLACEMENT  7/26/2019    IR PORT REMOVAL  9/20/2019    OOPHORECTOMY Bilateral     AR INSJ TUNNELED CTR VAD W/SUBQ PORT AGE 5 YR/> Left 11/12/2019    Procedure: INSERTION VENOUS PORT ( PORT-A-CATH) IR;  Surgeon: Cali Antonio DO;  [facility];  Service: Interventional Radiology    AR LAP, RADICAL HYST W/ TUBE&OV, NODE BX N/A 12/19/2017    Procedure: HYSTERECTOMY LAPAROSCOPIC TOTAL ([address]) W/ ROBOTICS; BILATERAL SALPINGOOPHERECTOMY; LYMPH NODE DISSECTION; lysis of adhesions;  Surgeon: Yakelin Franks MD;  [facility]: [facility];  Service: Gynecology Oncology    AR LAP,DIAGNOSTIC ABDOMEN N/A 12/19/2017    Procedure: LAPAROSCOPY DIAGNOSTIC;  Surgeon: Mynor Monteiro Lenord Claude, MD;  Location: [facility];  Service: Gynecology Oncology    TONSILLECTOMY      US GUIDED BREAST BIOPSY RIGHT COMPLETE Right 6/28/2019       Review of Medications:   Vitamins, Supplements and Herbals: yes: Hx RNYGB regimen: Vitamin D, Folic Acid, Align, calcium citrate TID, B12 1000 mcg QD, MVI BID (MVI does not have iron);  Iron 65 mg QD (2 hrs seperate from calcium)    Current Outpatient Medications:     acetaminophen-codeine (TYLENOL #3) 300-30 mg per tablet, Take 1 tablet by mouth every 6 (six) hours as needed for moderate pain, Disp: 20 tablet, Rfl: 0    ARIPiprazole (ABILIFY) 10 mg tablet, Take 10 mg by mouth daily, Disp: , Rfl:     aspirin (ECOTRIN LOW STRENGTH) 81 mg EC tablet, Take 81 mg by mouth daily, Disp: , Rfl:     Calcium-Magnesium-Vitamin D (CALCIUM 500 PO), Take by mouth daily , Disp: , Rfl:     cholecalciferol (VITAMIN D3) 1,000 units tablet, Take 1,000 Units by mouth daily, Disp: , Rfl:     ciprofloxacin (CIPRO) 500 mg tablet, TAKE 1 TABLET BY MOUTH 1 HOUR PRIOR TO TUBE CHANGE (Patient not taking: Reported on 1/21/2022), Disp: , Rfl:     [employer] Melatonin 3 MG, TAKE 1 TABLET (3 MG TOTAL) BY MOUTH DAILY AT BEDTIME, Disp: 90 tablet, Rfl: 1    Cyanocobalamin (VITAMIN B12 PO), Take by mouth daily , Disp: , Rfl:     dronabinol (MARINOL) 2 5 mg capsule, Take 1 capsule (2 5 mg total) by mouth 2 (two) times a day before meals, Disp: 60 capsule, Rfl: 0    enoxaparin (LOVENOX) 100 mg/mL, INJECT 1 ML (100 MG TOTAL) UNDER THE SKIN EVERY 24 HOURS, Disp: 30 mL, Rfl: 5    folic acid (FOLVITE) 1 mg tablet, Take 1 tablet (1 mg total) by mouth daily, Disp: , Rfl: 0    gemfibrozil (LOPID) 600 mg tablet, TAKE 1 TABLET BY MOUTH EVERY DAY, Disp: 90 tablet, Rfl: 1    lactulose (CHRONULAC) 10 g/15 mL solution, TAKE 45 ML (30 G TOTAL) BY MOUTH 2 (TWO) TIMES A DAY (Patient taking differently: as needed TAKE 45 ML (30 G TOTAL) BY MOUTH 2 (TWO) TIMES A DAY), Disp: 300 mL, Rfl: 2    LORazepam (ATIVAN) 0 5 mg tablet, Take 1 tablet (0 5 mg total) by mouth every 6 (six) hours as needed for anxiety (or nausea), Disp: 36 tablet, Rfl: 1    Multiple Vitamin (MULTIVITAMIN) tablet, Take 1 tablet by mouth daily, Disp: , Rfl:     naloxone (NARCAN) 4 mg/0 1 mL nasal spray, Administer 1 spray into a nostril  If no response after 2-3 minutes, give another dose in the other nostril using a new spray , Disp: 1 each, Rfl: 0    omeprazole (PriLOSEC) 20 mg delayed release capsule, Take 20 mg by mouth daily, Disp: , Rfl:     ondansetron (ZOFRAN) 8 mg tablet, Take 1 tablet (8 mg total) by mouth every 8 (eight) hours as needed for nausea or vomiting, Disp: 30 tablet, Rfl: 1    oxybutynin (DITROPAN XL) 15 MG 24 hr tablet, TAKE 1 TABLET BY MOUTH DAILY AT BEDTIME, Disp: 90 tablet, Rfl: 3    phenazopyridine (PYRIDIUM) 200 mg tablet, Take 1 tablet (200 mg total) by mouth daily as needed for bladder spasms, Disp: 30 tablet, Rfl: 0    quinapril (ACCUPRIL) 5 mg tablet, TAKE 1 TABLET BY MOUTH EVERYDAY AT BEDTIME, Disp: 90 tablet, Rfl: 1    rucaparib (RUBRACA) 300 mg tablet, Take 600 mg by mouth every 12 (twelve) hours Take with or without food  Do not repeat a vomited dose , Disp: , Rfl:     saccharomyces boulardii (FLORASTOR) 250 mg capsule, Take 1 capsule (250 mg total) by mouth 2 (two) times a day, Disp: , Rfl: 0    Sodium Chloride Flush (Normal Saline Flush) 0 9 % SOLN, , Disp: , Rfl:     venlafaxine (EFFEXOR) 75 mg tablet, Take 75 mg by mouth 3 (three) times a day  , Disp: , Rfl:   No current facility-administered medications for this visit      Facility-Administered Medications Ordered in Other Visits:     sodium chloride 0 9 % infusion, 20 mL/hr, Intravenous, Continuous, RAFITA Henning, Stopped at 01/24/22 1053    Most Recent Lab Results:  Lab Results   Component Value Date    WBC 6 37 01/21/2022    IRON 92 11/19/2021    TIBC 374 11/19/2021    FERRITIN 1,429 (H) 11/19/2021    CHOLESTEROL 195 01/21/2022    CHOL 183 10/27/2017    TRIG 88 11/01/2018    HDL 59 11/01/2018    LDLCALC 116 (H) 11/01/2018     (H) 01/21/2022     (H) 01/21/2022    ALB 3 4 (L) 01/21/2022     10/27/2017     05/12/2017    SODIUM 135 (L) 01/21/2022    SODIUM 134 (L) 12/30/2021    K 3 6 01/21/2022    K 4 1 12/30/2021     01/21/2022    BUN 24 01/21/2022    BUN 24 12/30/2021    CREATININE 1 05 01/21/2022    CREATININE 1 23 12/30/2021    EGFR 56 01/21/2022    PHOS 3 7 01/21/2022    PHOS 3 8 12/30/2021    GLUCOSE 219 (H) 12/19/2017    POCGLU 97 08/18/2020    GLUF 105 (H) 04/02/2021    GLUF 108 (H) 03/12/2021    GLUC 86 01/21/2022    HGBA1C 4 8 07/07/2021    HGBA1C 5 1 02/03/2020    HGBA1C 6 2 08/28/2019    CALCIUM 9 5 01/21/2022    FOLATE 5 4 10/06/2019    MG 1 9 01/21/2022     Anthropometric Measurements:   Height: 59"  Ht Readings from Last 1 Encounters:   01/24/22 4' 10" (1 473 m)     Wt Readings from Last 20 Encounters:   01/24/22 60 3 kg (133 lb)   01/21/22 61 2 kg (134 lb 14 7 oz)   01/21/22 61 2 kg (135 lb)   01/11/22 60 3 kg (133 lb)   01/03/22 61 kg (134 lb 8 oz)   12/30/21 61 2 kg (135 lb)   12/13/21 63 7 kg (140 lb 8 oz)   12/10/21 64 9 kg (143 lb)   12/09/21 65 3 kg (144 lb)   11/22/21 65 4 kg (144 lb 2 9 oz)   11/19/21 64 3 kg (141 lb 12 1 oz)   11/19/21 65 1 kg (143 lb 8 oz)   11/16/21 66 7 kg (147 lb)   11/01/21 66 7 kg (147 lb)   10/29/21 67 8 kg (149 lb 8 oz)   10/22/21 67 6 kg (149 lb 0 5 oz)   10/11/21 68 3 kg (150 lb 8 oz)   10/06/21 66 2 kg (146 lb)   09/20/21 69 2 kg (152 lb 8 oz)   09/17/21 70 5 kg (155 lb 8 oz)     Weight Hx:  · Usual Weight: 270# before RNYGB in 2001; lowest post-op wt: 200#; wt typically fluctuates between 215-230#  · Varian: (2/25/20) 185 6#, (3/3/20) 188 6#, (3/10/20) 187 2#, (3/19/20) 186 2#, (3/24/20) 187#, (3/31/20) 185 4#, (4/10/20) 184  4#   · Home Scale Wts: (2/19/20) 185#, (8/3/20) 194#    Hasn't weighed self at home recently    Oncology Nutrition-Anthropometrics      Nutrition from 1/25/2022 in [facility] On[facility] Nutrition from 12/28/2021 in [facility] Oncology Dietitian Services   Patient age (years): 61 years 61 years   Patient (female) height (in): 61 in 61 in   Current Weight to be used for anthropometric calculations (lbs) 135 lbs 140 5 lbs   Current Weight to be used for anthropometric calculations (kg) 61 4 kg 63 9 kg   BMI: 27 3 28 4   IBW female: 95 lbs 95 lbs   IBW (kg) female: 43 2 kg 43 2 kg   IBW % (female) 142 1 % 147 9 %   Adjusted BW (female): 105 lbs 106 4 lbs   Adjusted BW kg (female): 47 7 kg 48 4 kg   % weight change after 1 month: 0 % -4 4 %   Weight change after 1 month (lbs) 0 lbs -6 5 lbs   % weight change after 3 months: -9 5 % -9 6 %   Weight change after 3 months (lbs) -14 1 lbs -15 lbs         Nutrition-Focused Physical Findings: N/A d/t telephone call  Last in-person visit (10/11/21) observed:  mild  muscle depletion ([place]) - hollowing/scooping/depression and mild  body fat depletion ([place]) - hollowing/depression/dark circles    Food/Nutrition-Related History & Client/Social History:    Current Nutrition Impact Symptoms:   [] Nausea  [x] Reduced Appetite - better over the last 2 weeks  -has marinol but hasn't been taking lately d/t improving appetite [] Acid Reflux   [] Vomiting  [x] Unintended Wt Loss - ongoing & significant  -pt has been discussing psychological component of wt loss with her therapist  -cancer cachexia per GynOnc - referred to [facility] and bariatric dietitian, pt had weight management appt on 11/16/21 with the BRITANY   Has appt with bariatric RD today 1/25/22   [x] Malabsorption - S/p RNYGB in 2001   [] Diarrhea  [] Unintended Wt Gain  [] Dumping Syndrome   [x] Constipation - ongoing, +BM every 3-4 days  -has not needed apple prune sauce (mixture has been successful for her in the past), will use PRN  -On bowel protocol, using Miralax (hasn't been taking daily)  -recently started taking mg sulfate [] Thick Mucous/Secretions  [] Abdominal Pain    [] Dysgeusia (Altered Taste) [x] Xerostomia (Dry Mouth) - worsens during night  Keeps water nearby  -using Biotene toothpaste and mouthwash  Biotene has been effective  -Uses dry mouth lozenges prn [] Gas    [] Dysosmia (Altered Smell)  [] Carl Aliment  [] Difficulty Chewing    [x] Oral Mucositis (Sore Mouth) - Mouth sores has been worse over the last few weeks, using MMW  -root canal recently [x] Fatigue  [x] Pain: pubic area, limits po intake   [] Odynophagia   [] Esophagitis  [] Other:    [] Dysphagia [] Early Satiety  [] No Problems Eating      Food Allergies & Intolerances: yes: had skin patch testing which showed allergy to strawberries, but says she is able to eat strawberries without any side effects    Current Diet: Regular Diet, No Restrictions  Current Nutrition Intake: Increased since last visit   Appetite: Poor, but last 2-3 days has improving slightly  Nutrition Route: PO   Oral care: recently went to dentist, had a root canal at the end of December  Dentist also provided a mouthwash (magic mouthwash)  Also uses biotene  Meal planning/preparation mainly done by: mother does cooking; sister does shopping  Activity level: Sedentary  Limited by pain and medical condition  24 Hr Diet Recall: Tries to eat every 2 hours  Breakfast: this morning had some ham and cheese rolled up  Snack: caramel chocolate bar ([employer] brand)  Lunch: yesterday had 1/2 roast beef sandwich  Snack: pudding or yogurt  Yesterday had an orange creamsicle smoothie (had 25g protein)  Dinner: last night had 1/2 tuna salad sandwich  Snack: n/a    Beverages: water (16 9 oz x2-3), Unjury mixed with soup OR lemonade   -Does not drink coffee or alcohol      -Does not separate fluids from solids, does not have discomfort while eating and drinking at the same time    Supplements:    Unjury Protein Powder mixed with lemonade (32 oz + 1/2 pkt) OR with soup- every other day   o Has been having the chicken soup flavored packets mixed with soup  Has been using these more often    o Has some strawberry flavored packets which she plans to start mixing in with her lemonade again   Does not like Ensure or Boost; has tried homemade milkshakes and CIB but lacks the motivation to make them    Oncology Nutrition-Estimated Needs      Nutrition from 10/11/2021 in [place] [address] Oncology Dietitian Services Nutrition from 2021 in [place] [address] Oncology Dietitian Services   Weight type used Adjusted weight Adjusted weight   Weight in kilograms (kg) used for estimated needs 49 5 kg 50 7 kg   Energy needs formula:  35-40 kcal/kg 35-40 kcal/kg   Energy needs based on 35 kcal/k kcal 1775 kcal   Energy needs based on 40 kcal/k kcal 2029 kcal   Protein needs formula: 1 5-2 g/kg 1 5-2 g/kg   Protein needs based on 1 5 g/kg 74 g 76 g   Protein needs based on 2 g/kg 99 g 101 g   Fluid needs formula: 30-35 mL/kg 30-35 mL/kg   Fluid needs based on 30 mL/kg 1485 mL 1527 mL   Fluid needs in ounces 50 oz 52 oz   Fluid needs based on 35 mL/kg 1733 mL 1782 mL   Fluid needs in ounces 59 oz 60 oz        Discussion & Intervention:    Ragini was evaluated today for an RD follow up regarding wt loss and pt request for dietitian involvement throughout tx  Ragini is currently undergoing tx for endometrial cancer  Ragini reports she's been doing well the last couple weeks  She states she was weak over and right after megaan, but her appetite has started to  over the last 2 weeks  Her weight is stable around 135# over the last month  She does c/o mouth sores- uses MMW  Discussed also restarting BS/SW rinses  She also c/o dry mouth especially at nights  She uses biotene which helps  Discussed adequate hydration  She is eating 3 meals/day + snacks  Reviewed softer foods that might be easier to tolerate   She hasn't been using the unjury shakes as much recently, but did start drinking protein shakes from a local nutrition shop in her town and she stated they have about 25g protein  Encouraged a protein shake (or unjury), at least daily  She hasn't been taking Marinol d/t improving appetite, but she does have some if needed  She has an appointment with Bariatric RD today  Reviewed 24 hour recall, which revealed an inadequate po intake, and discussed ways to increase kcal, protein, and fluid intakes and optimize nutrient intake  Also reviewed the importance of wt management throughout the tx process and the role of a high kcal/ high protein diet in managing wt and overall health    Based on today's assessment, discussion included: MNT for: wt loss, lack of appetite, sore mouth, xerostomia, practicing proper oral care, a high kcal/protein diet & food choices to include at all meals & snacks (Examples of high kcal foods: cheese, full-fat dairy products, nut butter, plant-based fats, coconut oil/milk, avocado, butter, cream soup, etc  Examples of high protein foods: eggs, chicken, fish, beans/legumes, nuts/nut butters, bone broth, etc ) , fortifying foods for added kcal and protein (examples include: adding cheese to foods such as eggs, mashed potatoes, casseroles, etc ; Making oatmeal with whole milk rather than water; Making fortified mashed potatoes with cream, butter, dry milk powder, plain [place] yogurt, and cheese ), adequate hydration & fluid choices, sipping on calorie containing beverages (examples include: adding whole milk or cream to coffee, oral nutrition supplements, juice, electrolyte replacement beverages, milk, etc ), eating smaller more frequent meals every 2-3 hours (5-6 small meals/day), having consistent and planned snacks between meals, eating when feeling most hungry, utilizing  oral nutrition supplements, recipe suggestions/resources, trying new recipes, & cooking at home more often and adding extra fat to foods while cooking such as butter, plant-based oil, coconut oil/milk, avocado, nut butters, etc        Moving forward, Ragini was encouraged to increase kcal, protein, and fluid intakes, and increase oral nutrition supplements  Materials Provided: not applicable  All questions and concerns addressed during todays visit  Ragini has RD contact information  Nutrition Diagnosis:  Inadequate Energy Intake related to physiological causes, disease state and treatment related issues as evidenced by food recall, wt loss and discussion with pt and/or family  Increased Nutrient Needs (kcal & pro) related to increased demand for nutrients and disease state as evidenced by cancer dx and pt undergoing tx for cancer  Patient has clinical indicators (or ASPEN criteria) consistent with severe protein-calorie malnutrition in the context of Chronic Illness as evidenced by >7 5% wt loss in 3 months, </=75% energy intake vs  Estimated needs for >/=1 month, mild  muscle depletion ([place]) - hollowing/scooping/depression and mild  body fat depletion ([place]) - hollowing/depression/dark circles  Monitoring & Evaluation:   Goals:  · weight maintenance/stabilization  · adequate nutrition impact symptom management  · pt to meet >/=75% estimated nutrition needs daily   · Eat 5-6 small meals per day  · Try protein powder mixed with [employer] milk  · Drink 2 protein shakes daily  · Fortify foods to increase kcal/pro intake    · Progress Towards Goals: Progressing    Nutrition Rx & Recommendations:  · Diet: High Calorie, High Protein (for high calorie foods see pages 52-53, and for high protein foods see pages 49-51 in your Eating Hints book)  · Small, frequent meals/snacks may be easier to tolerate than 3 large daily meals  Aim for 5-6 small meals per day (every 2-3 hours)  · Include protein at all meals/snacks  · Avoid spicy, acidic, sharp/hard/crunchy foods & carbonated beverages as needed    · Follow proper oral care; Try baking soda/salt water rinse recipe (mix 3/4 tsp salt + 1 tsp baking soda + 1 qt water; rinse with plain water after using) in Eating Hints book (pg 18)  Brush your teeth before/after meals & before bed  · For appetite loss: try powdered or liquid nutrition supplements; eating by the clock every 2-3 hours; set a timer to remind yourself to eat, keep snacks nearby; add extra protein & calories to your diet; drink liquids in between meals, choose liquids that add calories; eat a bedtime snack; eat soft, cool or frozen foods; eat a larger meal when you feel well & rested; only sip small amounts of liquids during meals (see pages 10-12 in your Eating Hints book)  · For weight loss: monitor your weight at home at least 2x/week, record your weight, start by adding 250-500 extra calories per day, eat 5-6 small scheduled meals every 2-3 hrs, choose foods that are high in protein and calories (see pages 49-53 in your Eating Hints book), drink liquids with calories for example: milkshakes/smoothies/juice/soup/whole milk/chocolate milk, cook with protein fortified milk (see recipe on page 36 in your Eating Hints book), consider ready-to-drink oral nutrition supplements such as Ensure Plus, Ensure Enlive, Boost Plus, or Boost Very High Calorie, avoid "diet" and "light" foods when possible, avoid drinking too much with meals, contact your dietitian with any continued weight loss over the course of 1 week  For more info see pages 35-37 in your Eating Hints book  · For constipation: drink plenty of fluids (>64 oz/day); drink hot liquids; eat high-fiber foods as tolerated (whole grains, beans, peas, nuts, seeds, fruits, vegetables, etc); increase physical activity as tolerated  Avoid increasing fiber intake too quickly, add fiber into your diet slowly; keep a record of your bowel movements (see pages 13-14 in you Eating Hints book)    · For sore mouth/throat: choose foods that are easy to chew/swallow; cook foods until they are soft/tender; moisten & soften foods (gravy/sauce/broth/yogurt); cut food into small pieces; drink with a straw (as tolerated); eat with a very small spoon; eat cold or room temperature food; suck on ice chips; Avoid citrus, spicy foods, tomatoes/ketchup, salty foods, raw vegetables, sharp/crunchy/hard foods & alcohol (see pages 23-28 in your Eating Hints book)  · For dry mouth: sip water throughout the day; try very sweet (or tart, as tolerated) drinks; chew gum or suck on hard candy, popsicles, & ice chips; eat easy to swallow foods (such as pureed cooked foods or soups); moisten food with sauce/gravy/salad dressing; do not drink beer, wine, or any type of alcohol; keep lips moist with lip balm; avoid tobacco products & second-hand smoke; try Biotene as needed (see pages 17-18 in your Eating Hints book)  Follow proper oral care; Try baking soda/salt water rinse recipe (mix 3/4 tsp salt + 1 tsp baking soda + 1 qt water; rinse with pain water after using) in Eating Hints book (pg 18)  Brush your teeth before/after meals & before bed  · Weigh yourself regularly  If you notice weight loss, make an effort to increase your daily food/calorie intake  If you continue to notice loss after these efforts, reach out to your dietitian to establish a plan to stabilize weight  · Nutrition Supplements:  Aim for 2 Unjury protein shakes OR other protein shakes daily  · Try protein powder mixed with [employer]life milk  · unflavored Unjury OR strawberry flavored mixed into beverages again  · Continue chicken broth Unjury mixed with soup  · Protein shakes/smoothies from local shop   · Make soups with bone broth for more protein, Try [employer] Well Yes Soups (has some at home, but hasn't tried yet  Likes soups from [place]), Choose creamy soups  · Continue to set timer every 2 hrs as a reminder to eat as needed  · Continue baking soda salt water rinses for mouth sores    · Snack ideas: [place] yogurt with fruit, pudding, veggies with humus OR try greek yogurt based vegetable dip, peanut butter, grapes and cheese, fruit with a cheese stick , cottage cheese, soup, unjury, protein shakes/smoothies  · Lifetime vitamin/mineral supplementation recommended for Gastric Bypass Surgery:  · Multivitamin BID  · Iron 45 mg daily  · Vitamin D daily  · Calcium Citrate 500 mg TID (do not take with iron, needs to be 2 hrs apart from iron)  · B12 1000 mcg daily    Follow Up Plan: 2/23/22 phone follow up at 11 AM with Ken Reza RD   Recommend Referral to Other Providers: none at this time

## 2022-01-21 NOTE — PROGRESS NOTES
Port accessed and central labs drawn without incident  Patient declined AVS, aware of next appointment

## 2022-01-21 NOTE — PROGRESS NOTES
Assessment/Plan:    Problem List Items Addressed This Visit        Digestive    Drug induced constipation     Well-managed on current regimen  Cardiovascular and Mediastinum    Acute deep vein thrombosis (DVT) of proximal vein of lower extremity (HCC)     Will need lifetime anticoagulation given active malignancy  Genitourinary    Endometrial cancer ([facility] [address] ) - Primary (Chronic)     61year-old with recurrent stage IB endometrial cancer who is receiving treatment per the [facility] trial  She has received 19 cycles  No interval concerns since her last visit  She has maintained her weight since her last visit, and reports an improved appetite  Her performance status is 1  Labs reviewed and are WNL for treatment on Monday  Elevation in AST and ALT noted; value remains < 3x the upper limit of normal  F/u CT planned for February 2022  Obstruction of right ureter     Right nephrostomy tube draining appropriately  CHIEF COMPLAINT: Pre-chemotherapy evaluation      Problem:  Cancer Staging  Endometrial cancer [facility])  Staging form: Corpus Uteri - Carcinoma, AJCC 7th Edition  - Clinical stage from 12/19/2017: FIGO Stage IB (T1b, N0, M0) - Signed by Aide Vaughn MD on 2/12/2018        Previous therapy:  Oncology History   Endometrial cancer ([facility] [address] )   11/17/2017 Initial Diagnosis    Endometrial cancer ([facility] [facility] )     11/17/2017 Biopsy    ENDOMETRIAL BIOPSY: WELL DIFFERENTIATED ENDOMETRIAL ADENOCARCINOMA (FIGO I) WITH FOCALMUCINOUS FEATURES  Part B: ENDOCERVICAL POLYP:BENIGN ENDOCERVICAL      12/19/2017 Surgery    Robotic assisted total laparoscopic hysterectomy with bilateral salpingo-oophorectomy and sentinel bilateral pelvic lymph node dissection  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 (4 4 x 3 2 cm tumor, 9 4/15 4mm invasion, NO LVSI, washings revealed atypical cellular changes)     12/19/2017 Genetic Testing    Morrison testing negative     6/28/2019 Biopsy    A  Breast, Right, US BX Right Breast 1000 4 cmfn:  - Benign breast tissue with focal histiocytic aggregate  See comment   - Negative for atypia and in-situ or invasive carcinoma  7/8/2019 Recurrence    Presented with right lower extremity DVT and CT demonstrating right pelvic sidewall mass with venous, ureteral and nerve compression causing significant neuropathic pain  Biopsy:  Lymph Node, Right pelvic lymph node x3:  - High-grade adenocarcinoma  7/30/2019 - 1/6/2020 Chemotherapy    Taxol 175 mg/m2 and carboplatin AUC 6 every 21 days  Dose was reduced to taxol 135 mg/m2 and carboplatin AUC 5  Completed 6 cycles  Treatment protracted due to multiple hospital admissions  2/24/2020 - 4/13/2020 Radiation    adjuvant external beam radiation therapy to the whole pelvis to 4500 cGy followed by boost to gross disease of an additional 2200 cGy     11/23/2020 Progression    New necrotic adenopathy in the retroperitoneum on CT      12/21/2020 -  Chemotherapy    Began chemotherapy with rucaparib, atezolizumab, and bevacizumab as per [facility] clinical trial            Patient ID: Marcelo Brothers is a 61 y o  female     Patient has no significant interval concerns since her last visit  She reports that her appetite is improved, and she feels like she is eating more despite her weight staying the same  She was feeling weak but has noticed a difference in her energy level since her appetite has increased  She denies the following: fevers, worsened constipation, diarrhea, nausea, vomiting, bleeding, discharge, or pain changed from her baseline  She is ambulatory with a cane  The following portions of the patient's history were reviewed and updated as appropriate: allergies, current medications, past family history, past medical history, past social history, past surgical history and problem list     Review of Systems   Constitutional: Positive for fatigue   Negative for activity change, appetite change, chills, fever and unexpected weight change  HENT: Negative for mouth sores  Eyes: Negative  Respiratory: Negative for cough, chest tightness, shortness of breath and wheezing  Cardiovascular: Negative for chest pain, palpitations and leg swelling  Gastrointestinal: Negative for abdominal distention, abdominal pain, anal bleeding, blood in stool, constipation, diarrhea, nausea and vomiting  Endocrine: Negative  Genitourinary: Negative for difficulty urinating, dysuria, flank pain, frequency, hematuria, pelvic pain, urgency, vaginal bleeding, vaginal discharge and vaginal pain  Musculoskeletal: Negative for arthralgias and myalgias  Skin: Negative for color change, pallor and rash  Neurological: Negative for dizziness, weakness, numbness and headaches  Hematological: Negative  Psychiatric/Behavioral: The patient is not nervous/anxious          Current Outpatient Medications   Medication Sig Dispense Refill    acetaminophen-codeine (TYLENOL #3) 300-30 mg per tablet Take 1 tablet by mouth every 6 (six) hours as needed for moderate pain 20 tablet 0    ARIPiprazole (ABILIFY) 10 mg tablet Take 10 mg by mouth daily      aspirin (ECOTRIN LOW STRENGTH) 81 mg EC tablet Take 81 mg by mouth daily      Calcium-Magnesium-Vitamin D (CALCIUM 500 PO) Take by mouth daily       cholecalciferol (VITAMIN D3) 1,000 units tablet Take 1,000 Units by mouth daily      Cyanocobalamin (VITAMIN B12 PO) Take by mouth daily       dronabinol (MARINOL) 2 5 mg capsule Take 1 capsule (2 5 mg total) by mouth 2 (two) times a day before meals 60 capsule 0    enoxaparin (LOVENOX) 100 mg/mL INJECT 1 ML (100 MG TOTAL) UNDER THE SKIN EVERY 24 HOURS 30 mL 5    folic acid (FOLVITE) 1 mg tablet Take 1 tablet (1 mg total) by mouth daily  0    gemfibrozil (LOPID) 600 mg tablet TAKE 1 TABLET BY MOUTH EVERY DAY 90 tablet 1    lactulose (CHRONULAC) 10 g/15 mL solution TAKE 45 ML (30 G TOTAL) BY MOUTH 2 (TWO) TIMES A DAY (Patient taking differently: as needed TAKE 45 ML (30 G TOTAL) BY MOUTH 2 (TWO) TIMES A DAY) 300 mL 2    LORazepam (ATIVAN) 0 5 mg tablet Take 1 tablet (0 5 mg total) by mouth every 6 (six) hours as needed for anxiety (or nausea) 36 tablet 1    Multiple Vitamin (MULTIVITAMIN) tablet Take 1 tablet by mouth daily      naloxone (NARCAN) 4 mg/0 1 mL nasal spray Administer 1 spray into a nostril  If no response after 2-3 minutes, give another dose in the other nostril using a new spray  1 each 0    omeprazole (PriLOSEC) 20 mg delayed release capsule Take 20 mg by mouth daily      ondansetron (ZOFRAN) 8 mg tablet Take 1 tablet (8 mg total) by mouth every 8 (eight) hours as needed for nausea or vomiting 30 tablet 1    oxybutynin (DITROPAN XL) 15 MG 24 hr tablet TAKE 1 TABLET BY MOUTH DAILY AT BEDTIME 90 tablet 3    phenazopyridine (PYRIDIUM) 200 mg tablet Take 1 tablet (200 mg total) by mouth daily as needed for bladder spasms 30 tablet 0    quinapril (ACCUPRIL) 5 mg tablet TAKE 1 TABLET BY MOUTH EVERYDAY AT BEDTIME 90 tablet 1    rucaparib (RUBRACA) 300 mg tablet Take 600 mg by mouth every 12 (twelve) hours Take with or without food  Do not repeat a vomited dose   saccharomyces boulardii (FLORASTOR) 250 mg capsule Take 1 capsule (250 mg total) by mouth 2 (two) times a day  0    venlafaxine (EFFEXOR) 75 mg tablet Take 75 mg by mouth 3 (three) times a day        ciprofloxacin (CIPRO) 500 mg tablet TAKE 1 TABLET BY MOUTH 1 HOUR PRIOR TO TUBE CHANGE (Patient not taking: Reported on 1/21/2022)      [employer] Melatonin 3 MG TAKE 1 TABLET (3 MG TOTAL) BY MOUTH DAILY AT BEDTIME 90 tablet 1    Sodium Chloride Flush (Normal Saline Flush) 0 9 % SOLN  (Patient not taking: Reported on 11/16/2021 )      sodium chloride, PF, 0 9 % 10 mL by Intracatheter route daily for 30 doses 10cc syringes 300 mL 3     No current facility-administered medications for this visit             Objective:    Blood pressure 102/60, pulse 73, temperature (!) 97 3 °F (36 3 °C), temperature source Temporal, resp  rate 16, height 4' 10" (1 473 m), weight 61 2 kg (135 lb), SpO2 94 %, not currently breastfeeding  Body mass index is 28 22 kg/m²  Body surface area is 1 54 meters squared  Physical Exam  Vitals reviewed  Constitutional:       General: She is not in acute distress  Appearance: Normal appearance  She is not ill-appearing  HENT:      Head: Normocephalic and atraumatic  Mouth/Throat:      Mouth: Mucous membranes are moist    Eyes:      General: No scleral icterus  Right eye: No discharge  Left eye: No discharge  Conjunctiva/sclera: Conjunctivae normal    Pulmonary:      Effort: Pulmonary effort is normal    Genitourinary:     Comments: Yellow urine noted in nephrostomy bag  Musculoskeletal:      Right lower leg: No edema  Left lower leg: No edema  Comments: Ambulates with a cane  Skin:     General: Skin is warm and dry  Coloration: Skin is not jaundiced  Findings: No rash  Neurological:      General: No focal deficit present  Mental Status: She is alert and oriented to person, place, and time  Cranial Nerves: No cranial nerve deficit  Sensory: No sensory deficit  Motor: No weakness  Gait: Gait normal    Psychiatric:         Mood and Affect: Mood normal          Behavior: Behavior normal          Thought Content:  Thought content normal          Judgment: Judgment normal          No results found for:   Lab Results   Component Value Date     10/27/2017    K 3 6 01/21/2022     01/21/2022    CO2 24 01/21/2022    BUN 24 01/21/2022    CREATININE 1 05 01/21/2022    GLUCOSE 219 (H) 12/19/2017    GLUF 105 (H) 04/02/2021    CALCIUM 9 5 01/21/2022    CORRECTEDCA 10 0 01/21/2022     (H) 01/21/2022     (H) 01/21/2022    ALKPHOS 119 (H) 01/21/2022    PROT 6 9 10/27/2017    BILITOT 0 3 10/27/2017    EGFR 56 01/21/2022     Lab Results   Component Value Date    WBC 6 37 01/21/2022    HGB 9 7 (L) 01/21/2022    HCT 30 1 (L) 01/21/2022    MCV 97 01/21/2022     01/21/2022     Lab Results   Component Value Date    NEUTROABS 4 97 01/21/2022        Trend:  No results found for:

## 2022-01-21 NOTE — ASSESSMENT & PLAN NOTE
61year-old with recurrent stage IB endometrial cancer who is receiving treatment per the [facility] trial  She has received 19 cycles  No interval concerns since her last visit  She has maintained her weight since her last visit, and reports an improved appetite  Her performance status is 1  Labs reviewed and are WNL for treatment on Monday  Elevation in AST and ALT noted; value remains < 3x the upper limit of normal  F/u CT planned for February 2022

## 2022-01-21 NOTE — PROGRESS NOTES
Outpatient Follow-Up - Palliative and Supportive Care   Ragini Meelndez 61 y o  female [id number]    Assessment & Plan  Problem List Items Addressed This Visit        Genitourinary    Endometrial cancer ([facility] [address] ) - Primary (Chronic)       Other    Chemotherapy-induced nausea    Cachexia ([facility] [address] )        1  Endometrial cancer: Follows with Dr Dalton De La Torre and Bartolo Witt, heme/onc  Currently on EndoBarr trial  Tolerating treatment  2  Anorexia/Caquexia: Patient started on Marinol which helped to increase appetite  She states medication has worked, but had problems with pharmacy regarding refill earlier this month  Staff communicated with pharmacy that had medication available and refill was sent and confirmed  Follow up in 2 months  - Counseling on health screening and disease prevention, COVID-19 specific (CPT V65 49)    Medications adjusted this encounter:  Requested Prescriptions      No prescriptions requested or ordered in this encounter     No orders of the defined types were placed in this encounter  There are no discontinued medications  Li Mcnulty was seen today for symptoms and planning cares related to above illnesses  I have reviewed the patient's controlled substance dispensing history in the Prescription Drug Monitoring Program in compliance with the [facility] regulations before prescribing any controlled substances  They are invited to continue to follow with us  If there are questions or concerns, please contact us through our clinic/answering service 24 hours a day, seven days a week  Carrie Harvey MD  [facility] Palliative and Supportive Care  [phone]      Visit Information    Accompanied By: Family member    Source of History: Self    History Limitations: None      History of Present Illness      Li Mcnulty is a 61 y o  female who presents in follow up of symptoms related to endometrial cancer    Pertinent issues include: symptom management, depression or anxiety, anorexia      Patient is a 62 yo F who presents to the office for routine follow up  Patient follows with Dr Eboni Álvarez and Viki Cline, gris/onc for recurrent stage IB endometrial cancer  Currently receiving EndoBARR trial  Patient had been loosing weight and started on Marinol to increase appetite  She reports medication helped last month but due to problems with pharmacy she didn't get refill earlier this month  She states appetite has increase and she is happy she has been gaining weight  She reports liver enzymes have been trending up and they will monitor to make sure she can continue treatment  She states that overall she is feeling well and has no other questions or concerns  Past medical, surgical, social, and family histories are reviewed and pertinent updates are made  Review of Systems   Constitutional: Positive for decreased appetite and malaise/fatigue  Negative for weight loss  Respiratory: Negative for shortness of breath  Gastrointestinal: Negative for abdominal pain and nausea  Neurological: Negative for excessive daytime sleepiness  Psychiatric/Behavioral: The patient is not nervous/anxious  Vital Signs    /62 (BP Location: Left arm, Cuff Size: Standard)   Pulse 77   Temp (!) 97 4 °F (36 3 °C) (Temporal)   Wt 61 2 kg (134 lb 14 7 oz)   LMP  (LMP Unknown)   SpO2 97%   BMI 28 20 kg/m²     Physical Exam and Objective Data  Physical Exam  Constitutional:       General: She is not in acute distress  Appearance: She is ill-appearing  Eyes:      General:         Right eye: No discharge  Left eye: No discharge  Conjunctiva/sclera: Conjunctivae normal    Pulmonary:      Effort: Pulmonary effort is normal  No respiratory distress  Abdominal:      General: There is no distension  Skin:     General: Skin is warm and dry  Neurological:      Mental Status: She is alert and oriented to person, place, and time  Mental status is at baseline  Psychiatric:         Mood and Affect: Affect is flat  Behavior: Behavior normal  Behavior is cooperative  Radiology and Laboratory:  I personally reviewed and interpreted the following results:     Lab Results   Component Value Date    WBC 6 37 01/21/2022    HGB 9 7 (L) 01/21/2022    HCT 30 1 (L) 01/21/2022    MCV 97 01/21/2022     01/21/2022     Lab Results   Component Value Date     10/27/2017    SODIUM 135 (L) 01/21/2022    K 3 6 01/21/2022     01/21/2022    CO2 24 01/21/2022    AGAP 7 01/21/2022    BUN 24 01/21/2022    CREATININE 1 05 01/21/2022    GLUC 86 01/21/2022    GLUF 105 (H) 04/02/2021    CALCIUM 9 5 01/21/2022     (H) 01/21/2022     (H) 01/21/2022    ALKPHOS 119 (H) 01/21/2022    PROT 6 9 10/27/2017    TP 7 7 01/21/2022    BILITOT 0 3 10/27/2017    TBILI 0 37 01/21/2022    EGFR 56 01/21/2022     Lab Results   Component Value Date    HXG1TZDZRYJF 1 210 01/21/2022     CT CHEST, ABDOMEN AND PELVIS WITH IV CONTRAST 12/8/21     INDICATION:   C54 1: Malignant neoplasm of endometrium  Recurrent stage IB endometrial cancer currently being treated with chemotherapy  Reevaluate      COMPARISON:  Multiple prior examinations, most recently October 4, 2021      TECHNIQUE: CT examination of the chest, abdomen and pelvis was performed  In addition to portal venous phase postcontrast scanning through the abdomen and pelvis, delayed phase postcontrast scanning was performed through the upper abdominal viscera  Axial, sagittal, and coronal 2D reformatted images were created from the source data and submitted for interpretation      Radiation dose length product (DLP) for this visit:  822 87 mGy-cm     This examination, like all CT scans performed in the [facility], was performed utilizing techniques to minimize radiation dose exposure, including the use of iterative   reconstruction and automated exposure control      IV Contrast:  100 mL of iohexol (OMNIPAQUE)  Enteric Contrast: Enteric contrast was administered      FINDINGS:     RECIST TARGET LESIONS:      1   Upper aortocaval node, image 63 of series 2 measuring 1 9 x 1 3 cm, slightly decreased from from 2 1 x 1 5 cm on previous examination      2  Mid aortocaval node, image 67 of series 2, 1 1 x 0 7 cm, not significantly changed from 1 1 x 0 8 cm on previous examination      RECIST NON-TARGET LESIONS:      Right retrocrural node on image 44 of series 2, 3 mm in short axis, not significantly changed from prior examination and from as far back as August 6, 2020      FINDINGS:     CHEST     LUNGS:  Lingular atelectasis/scarring is reidentified  There is new patchy focal groundglass density in the posterior segment right upper lobe indistinctly marginated on image 36 of series 3 and image 74 of series 602  Lungs are otherwise clear  Layering mucus in the upper trachea      PLEURA:  Unremarkable      HEART/GREAT VESSELS:  Trace pericardial effusion similar from previous examination      MEDIASTINUM AND KATIANA:  No mediastinal or hilar lymphadenopathy is identified  Again noted is contrast and gaseous distention of the esophagus without evidence of esophageal wall thickening      CHEST WALL AND LOWER NECK:   Left chest port, catheter extends to the cavoatrial junction  No axillary lymphadenopathy      ABDOMEN     LIVER/BILIARY TREE:  Unremarkable  Specifically, no hepatic mass and normal hepatic contours      GALLBLADDER:  The patient is status post cholecystectomy         SPLEEN:  Unremarkable      PANCREAS:  Unremarkable      ADRENAL GLANDS:  Unremarkable      KIDNEYS/URETERS:  Right-sided nephroureteral stent is reidentified  There is no hydronephrosis  Right kidney is somewhat atrophic relative to the left kidney with some renal cortical thinning and slight delay of nephrogram relative to the left      Exophytic cyst is noted at the posterior upper pole the right kidney, unchanged      STOMACH AND BOWEL:  Surgical changes of prior Edgardo-en-Y gastric bypass surgery are identified  No abnormal bowel wall thickening  Again noted is a larger than typical volume of stool in the colon in keeping with reported history of constipation      APPENDIX:  The appendix is not well seen      ABDOMINOPELVIC CAVITY:  No ascites  No pneumoperitoneum  Retroperitoneal lymphadenopathy is reidentified, target lesions as measured above      Upper retroperitoneal lymph nodes are reidentified  The larger of these is slightly decreased in size when compared to prior examination  Otherwise no significant interval change in the size of retroperitoneal nodes  No new or enlarging   retroperitoneal, pelvic, or inguinal lymphadenopathy  No developing omental or peritoneal mass        Unchanged appearance of ill-defined soft tissue density along the margin of the stent at the level of the pelvic inlet in the right hemipelvis on image 92 unchanged from prior examinations and from prior PET study January 27, 2020 where some increased   activity was noted in the location  This is of uncertain etiology but could represent the sequela of ureteral reimplantation      VESSELS:  Unremarkable for patient's age      PELVIS     REPRODUCTIVE ORGANS:  Surgical changes of prior hysterectomy  No new or enlarging pelvic mass      URINARY BLADDER:  Right nephroureteral stent extends into the urinary bladder lumen  Bladder is otherwise unremarkable      ABDOMINAL WALL/INGUINAL REGIONS:  Multiple areas of increased density in the subcutaneous tissues, most consistent with injection sites      OSSEOUS STRUCTURES:  Sclerosis in the upper sacrum bilaterally similar from previous exam suspicious for insufficiency fractures possibly due to radiation therapy    Similarly, there is sclerosis on the right side of the pubic symphysis, unchanged from   most recent prior examination with associated bony deformity suggesting the sequela of prior nondisplaced fracture  Unchanged wedge compression deformity at L5 with associated sclerosis  No new destructive or sclerotic osseous lesions  No new   fractures      IMPRESSION:     Slight interval decrease in the size of an upper retroperitoneal node  Otherwise no significant interval change from previous examination  No new, enlarging, or otherwise suspicious solid mass in the chest, abdomen or pelvis      New small patchy area of groundglass density in the posterior segment left upper lobe could represent infectious/inflammatory or postinfectious/postinflammatory process  Consider low-dose noncontrast chest CT follow-up in 3 months to document interval   stability or resolution  30 minutes was spent face to face with Ragini Melendez and her family with greater than 50% of the time spent in counseling or coordination of care including discussions of instructions for disease self management, treatment instructions, follow up requirements, risk factors and risk reduction of disease and patient and family counseling/involvement in care   Additional time was also spent in discussing coronavirus vaccine indications, availability, and logistical challenges  All of the patient's or agent's questions were answered during this discussion

## 2022-01-22 ENCOUNTER — APPOINTMENT (OUTPATIENT)
Dept: LAB | Facility: CLINIC | Age: 67
End: 2022-01-22
Payer: COMMERCIAL

## 2022-01-22 LAB
BACTERIA UR QL AUTO: ABNORMAL /HPF
BILIRUB UR QL STRIP: NEGATIVE
CLARITY UR: CLEAR
COLOR UR: YELLOW
CREAT UR-MCNC: 17 MG/DL
GLUCOSE UR STRIP-MCNC: NEGATIVE MG/DL
HGB UR QL STRIP.AUTO: ABNORMAL
KETONES UR STRIP-MCNC: NEGATIVE MG/DL
LEUKOCYTE ESTERASE UR QL STRIP: ABNORMAL
NITRITE UR QL STRIP: POSITIVE
NON-SQ EPI CELLS URNS QL MICRO: ABNORMAL /HPF
PH UR STRIP.AUTO: 6 [PH]
PROT UR STRIP-MCNC: NEGATIVE MG/DL
PROT UR-MCNC: 14 MG/DL
PROT/CREAT UR: 0.82 MG/G{CREAT} (ref 0–0.1)
RBC #/AREA URNS AUTO: ABNORMAL /HPF
SP GR UR STRIP.AUTO: 1.01 (ref 1–1.03)
UROBILINOGEN UR QL STRIP.AUTO: 0.2 E.U./DL
WBC #/AREA URNS AUTO: ABNORMAL /HPF

## 2022-01-22 PROCEDURE — 82570 ASSAY OF URINE CREATININE: CPT

## 2022-01-22 PROCEDURE — 84156 ASSAY OF PROTEIN URINE: CPT

## 2022-01-22 PROCEDURE — 81001 URINALYSIS AUTO W/SCOPE: CPT

## 2022-01-24 ENCOUNTER — HOSPITAL ENCOUNTER (OUTPATIENT)
Dept: INFUSION CENTER | Facility: CLINIC | Age: 67
Discharge: HOME | End: 2022-01-24
Payer: COMMERCIAL

## 2022-01-24 ENCOUNTER — DOCUMENTATION (OUTPATIENT)
Dept: OTHER | Facility: HOSPITAL | Age: 67
End: 2022-01-24

## 2022-01-24 VITALS
BODY MASS INDEX: 27.92 KG/M2 | RESPIRATION RATE: 18 BRPM | HEIGHT: 58 IN | SYSTOLIC BLOOD PRESSURE: 112 MMHG | HEART RATE: 75 BPM | DIASTOLIC BLOOD PRESSURE: 58 MMHG | TEMPERATURE: 97.1 F | OXYGEN SATURATION: 95 % | WEIGHT: 133 LBS

## 2022-01-24 PROCEDURE — J9999Q0 INV ATEZOLIZUMAB 1200MG/20ML 20 ML: Performed by: NURSE PRACTITIONER

## 2022-01-24 PROCEDURE — 96413 CHEMO IV INFUSION 1 HR: CPT

## 2022-01-24 PROCEDURE — J9035Q0 INV BEVACIZUMAB 400MG/16ML 16 ML: Performed by: NURSE PRACTITIONER

## 2022-01-24 PROCEDURE — 96417 CHEMO IV INFUS EACH ADDL SEQ: CPT

## 2022-01-24 RX ADMIN — Medication 978 MG: at 10:08

## 2022-01-24 RX ADMIN — SODIUM CHLORIDE 20 ML/HR: 0.9 INJECTION, SOLUTION INTRAVENOUS at 08:32

## 2022-01-24 RX ADMIN — Medication 1200 MG: at 09:34

## 2022-01-24 NOTE — PATIENT INSTRUCTIONS
January 2022 Sunday Monday Tuesday Wednesday Thursday Friday Saturday                                 1       2     3    INF CHEMO-CLINICAL TRIAL 2 HR   8:00 AM   (210 min )   AN INF CHAIR [address] [facility] 4     5     6     7     8       9     10     11    FOLLOW UP PG  10:15 AM   (15 min )   Venita Delvin, [address] [facility] [place] 12     13     14     15       16     17     18     19     20     21    INF BLOOD SPECIMENS-CENTRAL   8:00 AM   (30 min )   BE INF CHAIR [facility]    OFFICE VISIT SHORT PG   8:45 AM   (15 min )   Kapil Quintana, 42 [facility]cology [place]    FOLLOW UP PG  10:15 AM   (60 min )   Kimberlee Harada, MD   [address]    LAB WALK IN   7:50 AM   (5 min )   AN MOB LAB PSC CHAIR 1   [facility] [address] MOB   23     24    INF CHEMO-CLINICAL TRIAL 2 HR   8:30 AM   (210 min )   AN INF CHAIR 12   [address] 25    ONCNUT FOLLOW UP PHONE CALL PG   9:00 AM   (60 min )   Al 34 [facility] Services    POST OP PG  10:45 AM   (60 min )   Fariba Mcgowan RD   [facility] 85     35     84     94       33     38                                          February 2022 Sunday Monday Tuesday Wednesday Thursday Friday Saturday             1     2     3    OFFICE VISIT LONG PG  12:45 PM   (30 min )   DO [facility] 4     5       6     7     8    CT CHEST ABDOMEN PELVIS W CON   9:30 AM   (30 min )   BE CT 1   [facility] CAT Scan 9     10     11    INF BLOOD SPECIMENS-CENTRAL   7:30 AM   (60 min )   BE INF CHAIR 2   [address] [place] [address]    OFFICE VISIT SHORT PG   8:30 AM   (15 min )   Nany Leiva 99 12       13     14    INF CHEMO-CLINICAL TRIAL 2 HR   8:00 AM   (210 min )   AN INF CHAIR [id number] [address] 15     16     17     18     19       20     21     22     23     24     25     26       27     28

## 2022-01-24 NOTE — PLAN OF CARE
Problem: Potential for Falls  Goal: Patient will remain free of falls  Description: INTERVENTIONS:  - Educate patient/family on patient safety including physical limitations  - Instruct patient to call for assistance with activity   - Consult OT/PT to assist with strengthening/mobility   - Keep Call bell within reach  - Keep bed low and locked with side rails adjusted as appropriate  - Keep care items and personal belongings within reach  - Initiate and maintain comfort rounds  - Make Fall Risk Sign visible to staff  - - Apply yellow socks and bracelet for high fall risk patients  - Consider moving patient to room near nurses station  Outcome: Progressing     Problem: PAIN - ADULT  Goal: Verbalizes/displays adequate comfort level or baseline comfort level  Description: Interventions:  - Encourage patient to monitor pain and request assistance  - Assess pain using appropriate pain scale  - Administer analgesics based on type and severity of pain and evaluate response  - Implement non-pharmacological measures as appropriate and evaluate response  - Consider cultural and social influences on pain and pain management  - Notify physician/advanced practitioner if interventions unsuccessful or patient reports new pain  Outcome: Progressing     Problem: INFECTION - ADULT  Goal: Absence or prevention of progression during hospitalization  Description: INTERVENTIONS:  - Assess and monitor for signs and symptoms of infection  - Monitor lab/diagnostic results  - Monitor all insertion sites, i e  indwelling lines, tubes, and drains  - Monitor endotracheal if appropriate and nasal secretions for changes in amount and color  - Christmas appropriate cooling/warming therapies per order  - Administer medications as ordered  - Instruct and encourage patient and family to use good hand hygiene technique  - Identify and instruct in appropriate isolation precautions for identified infection/condition  Outcome: Progressing     Problem: Knowledge Deficit  Goal: Patient/family/caregiver demonstrates understanding of disease process, treatment plan, medications, and discharge instructions  Description: Complete learning assessment and assess knowledge base    Interventions:  - Provide teaching at level of understanding  - Provide teaching via preferred learning methods  Outcome: Progressing

## 2022-01-24 NOTE — PROGRESS NOTES
Patient was seen in [address] [place] [address] for Cycle 20 Day 1 treatment  Patient was given Cycle 20 Rucaparib pills and drug diary, and patient returned Cycle 19 study pills, and diary   Patient denied any changes to AE'S and conmeds since office visit on 12/30/21, but she did state that she is experiencing dry mouth  She also said she had a root canal and now has a few mouth sores on the side she had the tooth worked on   CRN did discuss salt water rinses as well as biotene mouth wash   Patient was instructed to call if they get worse or she has no relief from them in a few days     Patient was told to call with any questions or concerns, Patient tolerates treatment without incident

## 2022-01-25 ENCOUNTER — OFFICE VISIT (OUTPATIENT)
Dept: BARIATRICS | Facility: CLINIC | Age: 67
End: 2022-01-25

## 2022-01-25 ENCOUNTER — NUTRITION (OUTPATIENT)
Dept: NUTRITION | Facility: CLINIC | Age: 67
End: 2022-01-25

## 2022-01-25 VITALS — WEIGHT: 135.6 LBS | HEIGHT: 59 IN | BODY MASS INDEX: 27.34 KG/M2

## 2022-01-25 DIAGNOSIS — Z71.3 NUTRITIONAL COUNSELING: Primary | ICD-10-CM

## 2022-01-25 DIAGNOSIS — Z48.815 ENCOUNTER FOR SURGICAL AFTERCARE FOLLOWING SURGERY OF DIGESTIVE SYSTEM: Primary | ICD-10-CM

## 2022-01-25 DIAGNOSIS — K91.2 POSTSURGICAL MALABSORPTION: ICD-10-CM

## 2022-01-25 PROCEDURE — 3008F BODY MASS INDEX DOCD: CPT | Performed by: STUDENT IN AN ORGANIZED HEALTH CARE EDUCATION/TRAINING PROGRAM

## 2022-01-25 PROCEDURE — RECHECK

## 2022-01-25 NOTE — PROGRESS NOTES
Bariatric Follow Up Nutrition Note    Type of surgery  Gastric bypass: laparoscopic  Surgery Date: 2001  21 years  post-op  Surgeon: Dr Park Singh  61 y o   female  Height 4' 10 5" (1 486 m), weight 61 5 kg (135 lb 9 6 oz), not currently breastfeeding  Dx with endometrial cancer in 2019:  Lost 100lbs since  Weight on Day of Weight Loss Surgery: 270lbs  Nelson with surgery:  200lbs, then gained about 30lbs until dx with CA in 2019 and since has lost 100lbs  Weight in (lb) to have BMI = 25: 122#  Pre-Op Excess Wt: 148#  Post-Op Wt Loss: 134 4#/ % EBWL in 91 year(s)--most of that weight loss has been since her cancer dx in 2019, not related to surgery      Estimate needs to maintain/gain 1/2 # per week:  6006-4322 cals  protein needs:  72-92gm (average about 80gm per day)    Review of History and Medications   Past Medical History:   Diagnosis Date    Anemia     Chemotherapy follow-up examination     Depression     Endometrial cancer ([facility] [address] ) 12/2017    Hyperglycemia     vx type 2 dm -- last assessed 4/1/14; resolved 11/7/17    Hyperlipidemia     Hypertension     Obesity     last assessed 10/14/17; resolved 11/7/17     Past Surgical History:   Procedure Laterality Date    ABDOMINAL SURGERY      GASTRIC BYPASS    CHOLECYSTECTOMY      at the time of gastric bypass    COLONOSCOPY      CT NEEDLE BIOPSY LYMPH NODE  7/8/2019    FL GUIDED CENTRAL VENOUS ACCESS DEVICE INSERTION  11/12/2019    GASTRIC BYPASS      HYSTERECTOMY Bilateral     total abdominal with salpingo-oophorectomy    IR NEPHROSTOMY TUBE PLACEMENT  7/26/2019    IR NEPHROURETERAL STENT CHECK/CHANGE/REPOSITION  4/6/2021    IR NEPHROURETERAL STENT CHECK/CHANGE/REPOSITION  6/4/2021    IR NEPHROURETERAL STENT CHECK/CHANGE/REPOSITION  9/3/2021    IR NEPHROURETERAL STENT CHECK/CHANGE/REPOSITION  12/7/2021    IR PICC LINE  9/27/2019    IR PORT PLACEMENT  7/26/2019    IR PORT REMOVAL  9/20/2019    OOPHORECTOMY Bilateral     NV INSJ TUNNELED CTR VAD W/SUBQ PORT AGE 5 YR/> Left 11/12/2019    Procedure: INSERTION VENOUS PORT ( PORT-A-CATH) IR;  Surgeon: Silverio Escamilla DO;  Location: [facility];  Service: Interventional Radiology    NV LAP, RADICAL HYST W/ TUBE&OV, NODE BX N/A 12/19/2017    Procedure: HYSTERECTOMY LAPAROSCOPIC TOTAL ([address]) W/ ROBOTICS; BILATERAL SALPINGOOPHERECTOMY; LYMPH NODE DISSECTION; lysis of adhesions;  Surgeon: Rosalba Dorsey MD;  [facility];  Service: Gynecology Oncology    NV LAP,DIAGNOSTIC ABDOMEN N/A 12/19/2017    Procedure: LAPAROSCOPY DIAGNOSTIC;  Surgeon: Rosalba Dorsey MD;  [facility];  Service: Gynecology Oncology    TONSILLECTOMY      US GUIDED BREAST BIOPSY RIGHT COMPLETE Right 6/28/2019     Social History     Socioeconomic History    Marital status: Single     Spouse name: Not on file    Number of children: Not on file    Years of education: Not on file    Highest education level: Not on file   Occupational History    Not on file   Tobacco Use    Smoking status: Never Smoker    Smokeless tobacco: Never Used   Vaping Use    Vaping Use: Never used   Substance and Sexual Activity    Alcohol use: Not Currently    Drug use: No    Sexual activity: Not Currently   Other Topics Concern    Not on file   Social History Narrative    Not on file     Social Determinants of Health     Financial Resource Strain: Not on file   Food Insecurity: Not on file   Transportation Needs: Not on file   Physical Activity: Not on file   Stress: Not on file   Social Connections: Not on file   Intimate Partner Violence: Not on file   Housing Stability: Not on file       Current Outpatient Medications:     acetaminophen-codeine (TYLENOL #3) 300-30 mg per tablet, Take 1 tablet by mouth every 6 (six) hours as needed for moderate pain, Disp: 20 tablet, Rfl: 0    ARIPiprazole (ABILIFY) 10 mg tablet, Take 10 mg by mouth daily, Disp: , Rfl:     aspirin (ECOTRIN LOW STRENGTH) 81 mg EC tablet, Take 81 mg by mouth daily, Disp: , Rfl:     Calcium-Magnesium-Vitamin D (CALCIUM 500 PO), Take by mouth daily , Disp: , Rfl:     cholecalciferol (VITAMIN D3) 1,000 units tablet, Take 1,000 Units by mouth daily, Disp: , Rfl:     ciprofloxacin (CIPRO) 500 mg tablet, TAKE 1 TABLET BY MOUTH 1 HOUR PRIOR TO TUBE CHANGE (Patient not taking: Reported on 1/21/2022), Disp: , Rfl:     [employer] Melatonin 3 MG, TAKE 1 TABLET (3 MG TOTAL) BY MOUTH DAILY AT BEDTIME, Disp: 90 tablet, Rfl: 1    Cyanocobalamin (VITAMIN B12 PO), Take by mouth daily , Disp: , Rfl:     dronabinol (MARINOL) 2 5 mg capsule, Take 1 capsule (2 5 mg total) by mouth 2 (two) times a day before meals, Disp: 60 capsule, Rfl: 0    enoxaparin (LOVENOX) 100 mg/mL, INJECT 1 ML (100 MG TOTAL) UNDER THE SKIN EVERY 24 HOURS, Disp: 30 mL, Rfl: 5    folic acid (FOLVITE) 1 mg tablet, Take 1 tablet (1 mg total) by mouth daily, Disp: , Rfl: 0    gemfibrozil (LOPID) 600 mg tablet, TAKE 1 TABLET BY MOUTH EVERY DAY, Disp: 90 tablet, Rfl: 1    lactulose (CHRONULAC) 10 g/15 mL solution, TAKE 45 ML (30 G TOTAL) BY MOUTH 2 (TWO) TIMES A DAY (Patient taking differently: as needed TAKE 45 ML (30 G TOTAL) BY MOUTH 2 (TWO) TIMES A DAY), Disp: 300 mL, Rfl: 2    LORazepam (ATIVAN) 0 5 mg tablet, Take 1 tablet (0 5 mg total) by mouth every 6 (six) hours as needed for anxiety (or nausea), Disp: 36 tablet, Rfl: 1    Multiple Vitamin (MULTIVITAMIN) tablet, Take 1 tablet by mouth daily, Disp: , Rfl:     naloxone (NARCAN) 4 mg/0 1 mL nasal spray, Administer 1 spray into a nostril   If no response after 2-3 minutes, give another dose in the other nostril using a new spray , Disp: 1 each, Rfl: 0    omeprazole (PriLOSEC) 20 mg delayed release capsule, Take 20 mg by mouth daily, Disp: , Rfl:     ondansetron (ZOFRAN) 8 mg tablet, Take 1 tablet (8 mg total) by mouth every 8 (eight) hours as needed for nausea or vomiting, Disp: 30 tablet, Rfl: 1    oxybutynin (DITROPAN XL) 15 MG 24 hr tablet, TAKE 1 TABLET BY MOUTH DAILY AT BEDTIME, Disp: 90 tablet, Rfl: 3    phenazopyridine (PYRIDIUM) 200 mg tablet, Take 1 tablet (200 mg total) by mouth daily as needed for bladder spasms, Disp: 30 tablet, Rfl: 0    quinapril (ACCUPRIL) 5 mg tablet, TAKE 1 TABLET BY MOUTH EVERYDAY AT BEDTIME, Disp: 90 tablet, Rfl: 1    rucaparib (RUBRACA) 300 mg tablet, Take 600 mg by mouth every 12 (twelve) hours Take with or without food  Do not repeat a vomited dose , Disp: , Rfl:     saccharomyces boulardii (FLORASTOR) 250 mg capsule, Take 1 capsule (250 mg total) by mouth 2 (two) times a day, Disp: , Rfl: 0    Sodium Chloride Flush (Normal Saline Flush) 0 9 % SOLN, , Disp: , Rfl:     venlafaxine (EFFEXOR) 75 mg tablet, Take 75 mg by mouth 3 (three) times a day  , Disp: , Rfl:   No current facility-administered medications for this visit  Facility-Administered Medications Ordered in Other Visits:     sodium chloride 0 9 % infusion, 20 mL/hr, Intravenous, Continuous, RAFITA [place], Stopped at 01/24/22 1053     Multi 2x/day  Calcium 2x/day  B12 1x/day  Vitamin D  Folic acid  Iron  Probiotics 2x/day    Food Intake and Lifestyle Assessment   Food Intake Assessment completed via usual diet recall  · Pt was dx with endometrial cancer in 2019 and has seen 100# weight loss since  She is interested in at least maintaining her current weight  · Was started on Marinol 2x/day by pallative care, ran out, and got refilled on 1/21, but now feels appetite is better so hasn't restarted it     · Tries to eat every 2 hrs  · Has mouth sores which prevents her to eat certain food  · Appetite was better over the past 1 5 weeks    Wakes:  8-9am wakes  Breakfast: 9am-cereal (honey nut cheerios) w/fairlife milk OR today had 1 slice ham and 1 slice of cheese--suggested higher protein breakfast   Snack: 67ZC-3 slice of bread with chicken or tuna salad   Lunch: 1pm-soup (panera chicken noodle soup)  Snack: 3pm-peppers with dressing or grapes  Dinner: 5-6pm-Does take out most often (lives with her mom who is 90 yrs old)--1-1 5 oz of chicken, mashed potatoes OR last night had 1/2 roast beef sandwich with cheese with wise  Snack: weight watchers caramel candy    Beverage intake: water, sugar free beverages and sometimes lemonade with strawberry unjury  Diet texture/stage: soft  Protein supplement: unjury  Estimated protein intake per day:  If does the unjury protein powder will meet protien needs  Estimated fluid intake per day: 32-48oz water, large unsweet tea from DD and occasional lemonade with unjury  Meals eaten away from home: one a day at dinner is most often take out  Typical meal pattern: 3 meals per day and 3 snacks per day--eats about every 2 hrs  Eating Behaviors: Appropriate portion sizes    Food allergies or intolerances: crunchy foods are hard to eat right now do to her mouth sores  Cultural or Alevism considerations: none    Physical Assessment  Nutrition Related Findings  Constipation--BM every 3-4 days  Was taking Miralax, but hasn't taking regularly  When bad will take lactulose  Mouth sores--needs softer foods, also going to try Biotin mouthwash    Physical Activity  Types of exercise: None  Current physical limitations: current with cancer dx    Psychosocial Assessment   Support systems: friend(s) relative(s)  Socioeconomic factors: retired    Nutrition Diagnosis  Diagnosis: Inadequate protein intake (NI-5 7  3)  Related to: Altered GI function and poor appetite due to cancer sx/chem meds  As Evidenced by: Unintentional weight loss     Interventions and Teaching   Patient educated on post-op nutrition guidelines  Patient educated and handouts provided    Capacity of post-surgery stomach  Adequate hydration  Nutrition considerations after surgery  Protein supplements  Meal planning and preparation  Appropriate carbohydrate, protein, and fat intake, and food/fluid choices to maximize safe weight loss, nutrient intake, and tolerance   Dietary and lifestyle changes  Techniques for self monitoring and keeping daily food journal  Vitamin / Mineral supplementation of Multivitamin with minerals, Calcium, Vitamin B12, Iron and Vitamin D    Education provided to: patient    Barriers to learning: No barriers identified    Readiness to change: action    Comprehension: verbalizes understanding     Expected Compliance: good    Pt presents today 16 years s/p RYGB  After surgery she got down to 200lbs, regained 30lbs, but in 2019 she was dx with endometrial cancer and has since lost 100lbs  She would like to stop losing weight, at least maintain weight  Pt does state her appetite has improved some over the past 2 weeks  She is working on eating something every 2 hrs  Encouraged healthy, higher calorie foods such as avocado, nut butters, hummus, dressings, etc to help better meet calorie needs w/o large volumes  She also reports she has multiple Unjury proteins powders at home  Encouraged at least one per day for an added 20gm protein  Requested pt keep a food log with food and portion intake and bring in to RD visit in 2 weeks to review and better assess intake  Pt verbalizes understanding      Goals  Food journal via written food log provided and bring to next appt  Eat 3 meals per day with protein at each meal  Eat every two hours  Add higher calorie healthier food options ie: avocado, peanut butter, hummus, etc to help better meet calorie needs  Goal 5072-6970 cals for weight maintenance/mild weight gain  Include one protein shake per day  Aim for 70-80gm protein per day  F/U with RD in 2 weeks     Time Spent:   30 Minutes

## 2022-01-25 NOTE — PROGRESS NOTES
Bariatric Behavioral Health Evaluation    Presenting Problem: Luz Benavides,  1955  Patient had RNY surgery with Dr Juan Carlos Caicedo in , coming to our office for annual follow up and support  Patient is going through cancer treatment, trying to maintain weight  Pre-morbid level of function and history of present illness: History of hypertension, DVT, dyslipidemia, currently receiving treatment for endometrial cancer  Psychiatric/Psychological Treatment Diagnosis: Patient has a diagnosis of depression, she feels her symptoms are very well controlled with current treatment  Patient denies any substance use disorder, she is a non-smoker and doesn't drink alcohol  Outpatient Counselor Yes, she sees Dr William Sin for phone sessions every 1-3 weeks     Psychiatrist Yes, she sees Dr Orquidea Alas for med checks every three months      Have you had Inpatient Treatment? No    Domestic Violence No    Abuse History:  None    Social situations: Patient lives with her elderly mother  Her brother and sister are major supports to her and live close by  Additional comments/stressors related to family/relationships/peer support: Patient is undergoing cancer treatment finds it very difficult to maintain her weight  She has struggled with weight gain most of her life and has a hard time reconciling how now she cannot gain weight  She is working with her therapist about her eating habits being a way she can feel some sense of control, which she does through not eating  Encouraged to reframe that mindset in she has control over what goes into her body and what fuels she will avoid that feed her cancer  Patient is also taking care of her mother who has dementia which can be stressful  She does have support with family, friends and neighbors who come to help and she does have respite when she needs to go out      Physical/Psychological Assessment:     Appearance: appropriate  Sociability: average  Affect: appropriate  Mood: calm  Thought Process: coherent  Speech: normal  Content: no impairment  Orientation: person  Yes , place  Yes , time  Yes , normal attention span  Yes , normal memory  Yes  , decreased in concentration ability  No and normal judgement  Yes   Insight: emotional  good    Risk Assessment:     none    Recommendations: Patient meets the criteria to be a member of [facility] Bariatric surgery program      Risk of Harm to Self or Others: Patient denies any SI/HI, no audio or visual hallucinations    Observation:     Based on the previous information, the client presents the following risk of harm to self or others: low     Note: Patient will be following up with RD on 2/7 for continued dietary guidance in her goal to maintain a healthy weight  She declined additional support with SW at this time, has contact information to schedule if she needs that support in the future

## 2022-01-25 NOTE — PATIENT INSTRUCTIONS
Nutrition Rx & Recommendations:  · Diet: High Calorie, High Protein (for high calorie foods see pages 52-53, and for high protein foods see pages 49-51 in your Eating Hints book)  · Small, frequent meals/snacks may be easier to tolerate than 3 large daily meals  Aim for 5-6 small meals per day (every 2-3 hours)  · Include protein at all meals/snacks  · Avoid spicy, acidic, sharp/hard/crunchy foods & carbonated beverages as needed  · Follow proper oral care; Try baking soda/salt water rinse recipe (mix 3/4 tsp salt + 1 tsp baking soda + 1 qt water; rinse with plain water after using) in Eating Hints book (pg 18)  Brush your teeth before/after meals & before bed  · For appetite loss: try powdered or liquid nutrition supplements; eating by the clock every 2-3 hours; set a timer to remind yourself to eat, keep snacks nearby; add extra protein & calories to your diet; drink liquids in between meals, choose liquids that add calories; eat a bedtime snack; eat soft, cool or frozen foods; eat a larger meal when you feel well & rested; only sip small amounts of liquids during meals (see pages 10-12 in your Eating Hints book)  · For weight loss: monitor your weight at home at least 2x/week, record your weight, start by adding 250-500 extra calories per day, eat 5-6 small scheduled meals every 2-3 hrs, choose foods that are high in protein and calories (see pages 49-53 in your Eating Hints book), drink liquids with calories for example: milkshakes/smoothies/juice/soup/whole milk/chocolate milk, cook with protein fortified milk (see recipe on page 36 in your Eating Hints book), consider ready-to-drink oral nutrition supplements such as Ensure Plus, Ensure Enlive, Boost Plus, or Boost Very High Calorie, avoid "diet" and "light" foods when possible, avoid drinking too much with meals, contact your dietitian with any continued weight loss over the course of 1 week    For more info see pages 35-37 in your Eating Hints book   · For constipation: drink plenty of fluids (>64 oz/day); drink hot liquids; eat high-fiber foods as tolerated (whole grains, beans, peas, nuts, seeds, fruits, vegetables, etc); increase physical activity as tolerated  Avoid increasing fiber intake too quickly, add fiber into your diet slowly; keep a record of your bowel movements (see pages 13-14 in you Eating Hints book)  · For sore mouth/throat: choose foods that are easy to chew/swallow; cook foods until they are soft/tender; moisten & soften foods (gravy/sauce/broth/yogurt); cut food into small pieces; drink with a straw (as tolerated); eat with a very small spoon; eat cold or room temperature food; suck on ice chips; Avoid citrus, spicy foods, tomatoes/ketchup, salty foods, raw vegetables, sharp/crunchy/hard foods & alcohol (see pages 23-28 in your Eating Hints book)  · For dry mouth: sip water throughout the day; try very sweet (or tart, as tolerated) drinks; chew gum or suck on hard candy, popsicles, & ice chips; eat easy to swallow foods (such as pureed cooked foods or soups); moisten food with sauce/gravy/salad dressing; do not drink beer, wine, or any type of alcohol; keep lips moist with lip balm; avoid tobacco products & second-hand smoke; try Biotene as needed (see pages 17-18 in your Eating Hints book)  Follow proper oral care; Try baking soda/salt water rinse recipe (mix 3/4 tsp salt + 1 tsp baking soda + 1 qt water; rinse with pain water after using) in Eating Hints book (pg 18)  Brush your teeth before/after meals & before bed  · Weigh yourself regularly  If you notice weight loss, make an effort to increase your daily food/calorie intake  If you continue to notice loss after these efforts, reach out to your dietitian to establish a plan to stabilize weight     · Nutrition Supplements:  Aim for 2 Unjury protein shakes OR other protein shakes daily  · Try protein powder mixed with Fairlife milk  · unflavored Unjury OR strawberry flavored mixed into beverages again  · Continue chicken broth Unjury mixed with soup  · Protein shakes/smoothies from local shop   · Make soups with bone broth for more protein, Try [employer]'s Well Yes Soups (has some at home, but hasn't tried yet  Likes soups from [place]), Choose creamy soups  · Continue to set timer every 2 hrs as a reminder to eat as needed  · Continue baking soda salt water rinses for mouth sores    · Snack ideas: [place] yogurt with fruit, pudding, veggies with humus OR try greek yogurt based vegetable dip, peanut butter, grapes and cheese, fruit with a cheese stick , cottage cheese, soup, unjury, protein shakes/smoothies  · Lifetime vitamin/mineral supplementation recommended for Gastric Bypass Surgery:  · Multivitamin BID  · Iron 45 mg daily  · Vitamin D daily  · Calcium Citrate 500 mg TID (do not take with iron, needs to be 2 hrs apart from iron)  · B12 1000 mcg daily    Follow Up Plan: 2/23/22 phone follow up at 11 AM with Harlan Flores RD   Recommend Referral to Other Providers: none at this time

## 2022-01-27 ENCOUNTER — RA CDI HCC (OUTPATIENT)
Dept: OTHER | Facility: HOSPITAL | Age: 67
End: 2022-01-27

## 2022-01-27 NOTE — PROGRESS NOTES
[address]  coding opportunities          Number of diagnosis code(s) already on the problem list added to FYI flag: 3     Chart Reviewed * (Number of) Inbasket suggestions sent to Provider: 1                  Patients insurance company: [employer] (Medicare and Commercial for [employer] and [employer])     Visit status: Patient canceled the appointment     Provider never responded to [address]  coding request     [facility] [address]  coding opportunities          Number of diagnosis code(s) already on the problem list added to FYI flag: 3     Chart Reviewed * (Number of) Inbasket suggestions sent to Provider: 1                  Patients insurance company: [employer] (Medicare and Commercial for [employer] and [employer])           Based on clinical documentation indicated in your record, it appears that the patient may have the following conditions:     F11 20 Opioid dependence, uncomplicated     Please review/recertify the following conditions for 2022:    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

## 2022-02-04 DIAGNOSIS — I10 BENIGN ESSENTIAL HYPERTENSION: ICD-10-CM

## 2022-02-04 RX ORDER — QUINAPRIL 5 1/1
TABLET ORAL
Qty: 90 TABLET | Refills: 0 | Status: SHIPPED | OUTPATIENT
Start: 2022-02-04 | End: 2022-04-14

## 2022-02-07 ENCOUNTER — TELEMEDICINE (OUTPATIENT)
Dept: BARIATRICS | Facility: CLINIC | Age: 67
End: 2022-02-07

## 2022-02-07 DIAGNOSIS — K91.2 POSTSURGICAL MALABSORPTION: Primary | ICD-10-CM

## 2022-02-07 PROCEDURE — RECHECK

## 2022-02-07 NOTE — PROGRESS NOTES
Bariatric Follow Up Nutrition Note    Phone visit:    i  Name was verified by patient stating name? yes  ii   verified by patient stating? yes  iii  Verification of patient location yes  iv  Verified patient is in state in which I hold an active license? yes  v  Verified the patient is alone? yes  vi  I would like to verify that you were offered a live visit but are now consenting to this telephone visit? yes  vii  This visit is free yes      Type of surgery  Gastric bypass: laparoscopic  Surgery Date:   21 years  post-op  Surgeon: Dr Neil Boucher  61 y o   female    VIRTUAL visit:  NO REPORTED WEIGHT     Dx with endometrial cancer in 2019:  Lost 100lbs since  Weight on Day of Weight Loss Surgery: 270lbs  Nelson with surgery:  200lbs, then gained about 30lbs until dx with CA in 2019 and since has lost 100lbs  Weight in (lb) to have BMI = 25: 122#  Pre-Op Excess Wt: 148#  Post-Op Wt Loss: 134 4#/ % EBWL in 91 year(s)--most of that weight loss has been since her cancer dx in 2019, not related to surgery      Estimate needs to maintain/gain 1/2 # per week:  8747-5614 cals  protein needs:  72-92gm (average about 80gm per day)    Review of History and Medications   Past Medical History:   Diagnosis Date    Anemia     Chemotherapy follow-up examination     Depression     Endometrial cancer ([facility] [address] ) 2017    Hyperglycemia     vx type 2 dm -- last assessed 14; resolved 17    Hyperlipidemia     Hypertension     Obesity     last assessed 10/14/17; resolved 17     Past Surgical History:   Procedure Laterality Date    ABDOMINAL SURGERY      GASTRIC BYPASS    CHOLECYSTECTOMY      at the time of gastric bypass    COLONOSCOPY      CT NEEDLE BIOPSY LYMPH NODE  2019    FL GUIDED CENTRAL VENOUS ACCESS DEVICE INSERTION  2019    GASTRIC BYPASS      HYSTERECTOMY Bilateral     total abdominal with salpingo-oophorectomy    IR NEPHROSTOMY TUBE PLACEMENT 7/26/2019    IR NEPHROURETERAL STENT CHECK/CHANGE/REPOSITION  4/6/2021    IR NEPHROURETERAL STENT CHECK/CHANGE/REPOSITION  6/4/2021    IR NEPHROURETERAL STENT CHECK/CHANGE/REPOSITION  9/3/2021    IR NEPHROURETERAL STENT CHECK/CHANGE/REPOSITION  12/7/2021    IR PICC LINE  9/27/2019    IR PORT PLACEMENT  7/26/2019    IR PORT REMOVAL  9/20/2019    OOPHORECTOMY Bilateral     IA INSJ TUNNELED CTR VAD W/SUBQ PORT AGE 5 YR/> Left 11/12/2019    Procedure: INSERTION VENOUS PORT ( PORT-A-CATH) IR;  Surgeon: Erin Griffin DO;  [facility];  Service: Interventional Radiology    IA LAP, RADICAL HYST W/ TUBE&OV, NODE BX N/A 12/19/2017    Procedure: HYSTERECTOMY LAPAROSCOPIC TOTAL ([address] [facility] [address]) W/ ROBOTICS; BILATERAL SALPINGOOPHERECTOMY; LYMPH NODE DISSECTION; lysis of adhesions;  Surgeon: Conor Britton MD;  [facility];  Service: Gynecology Oncology    IA LAP,DIAGNOSTIC ABDOMEN N/A 12/19/2017    Procedure: LAPAROSCOPY DIAGNOSTIC;  Surgeon: Conor Britton MD;  [facility];  Service: Gynecology Oncology    TONSILLECTOMY      US GUIDED BREAST BIOPSY RIGHT COMPLETE Right 6/28/2019     Social History     Socioeconomic History    Marital status: Single     Spouse name: Not on file    Number of children: Not on file    Years of education: Not on file    Highest education level: Not on file   Occupational History    Not on file   Tobacco Use    Smoking status: Never Smoker    Smokeless tobacco: Never Used   Vaping Use    Vaping Use: Never used   Substance and Sexual Activity    Alcohol use: Not Currently    Drug use: No    Sexual activity: Not Currently   Other Topics Concern    Not on file   Social History Narrative    Not on file     Social Determinants of Health     Financial Resource Strain: Not on file   Food Insecurity: Not on file   Transportation Needs: Not on file   Physical Activity: Not on file   Stress: Not on file   Social Connections: Not on file   Intimate Partner Violence: Not on file   Housing Stability: Not on file       Current Outpatient Medications:     acetaminophen-codeine (TYLENOL #3) 300-30 mg per tablet, Take 1 tablet by mouth every 6 (six) hours as needed for moderate pain, Disp: 20 tablet, Rfl: 0    ARIPiprazole (ABILIFY) 10 mg tablet, Take 10 mg by mouth daily, Disp: , Rfl:     aspirin (ECOTRIN LOW STRENGTH) 81 mg EC tablet, Take 81 mg by mouth daily, Disp: , Rfl:     Calcium-Magnesium-Vitamin D (CALCIUM 500 PO), Take by mouth daily , Disp: , Rfl:     cholecalciferol (VITAMIN D3) 1,000 units tablet, Take 1,000 Units by mouth daily, Disp: , Rfl:     ciprofloxacin (CIPRO) 500 mg tablet, TAKE 1 TABLET BY MOUTH 1 HOUR PRIOR TO TUBE CHANGE (Patient not taking: Reported on 1/21/2022), Disp: , Rfl:     [employer] Melatonin 3 MG, TAKE 1 TABLET (3 MG TOTAL) BY MOUTH DAILY AT BEDTIME, Disp: 90 tablet, Rfl: 1    Cyanocobalamin (VITAMIN B12 PO), Take by mouth daily , Disp: , Rfl:     dronabinol (MARINOL) 2 5 mg capsule, Take 1 capsule (2 5 mg total) by mouth 2 (two) times a day before meals, Disp: 60 capsule, Rfl: 0    enoxaparin (LOVENOX) 100 mg/mL, INJECT 1 ML (100 MG TOTAL) UNDER THE SKIN EVERY 24 HOURS, Disp: 30 mL, Rfl: 5    folic acid (FOLVITE) 1 mg tablet, Take 1 tablet (1 mg total) by mouth daily, Disp: , Rfl: 0    gemfibrozil (LOPID) 600 mg tablet, TAKE 1 TABLET BY MOUTH EVERY DAY, Disp: 90 tablet, Rfl: 1    lactulose (CHRONULAC) 10 g/15 mL solution, TAKE 45 ML (30 G TOTAL) BY MOUTH 2 (TWO) TIMES A DAY (Patient taking differently: as needed TAKE 45 ML (30 G TOTAL) BY MOUTH 2 (TWO) TIMES A DAY), Disp: 300 mL, Rfl: 2    LORazepam (ATIVAN) 0 5 mg tablet, Take 1 tablet (0 5 mg total) by mouth every 6 (six) hours as needed for anxiety (or nausea), Disp: 36 tablet, Rfl: 1    Multiple Vitamin (MULTIVITAMIN) tablet, Take 1 tablet by mouth daily, Disp: , Rfl:     naloxone (NARCAN) 4 mg/0 1 mL nasal spray, Administer 1 spray into a nostril   If no response after 2-3 minutes, give another dose in the other nostril using a new spray , Disp: 1 each, Rfl: 0    omeprazole (PriLOSEC) 20 mg delayed release capsule, Take 20 mg by mouth daily, Disp: , Rfl:     ondansetron (ZOFRAN) 8 mg tablet, Take 1 tablet (8 mg total) by mouth every 8 (eight) hours as needed for nausea or vomiting, Disp: 30 tablet, Rfl: 1    oxybutynin (DITROPAN XL) 15 MG 24 hr tablet, TAKE 1 TABLET BY MOUTH DAILY AT BEDTIME, Disp: 90 tablet, Rfl: 3    phenazopyridine (PYRIDIUM) 200 mg tablet, Take 1 tablet (200 mg total) by mouth daily as needed for bladder spasms, Disp: 30 tablet, Rfl: 0    quinapril (ACCUPRIL) 5 mg tablet, TAKE 1 TABLET BY MOUTH EVERYDAY AT BEDTIME, Disp: 90 tablet, Rfl: 0    rucaparib (RUBRACA) 300 mg tablet, Take 600 mg by mouth every 12 (twelve) hours Take with or without food  Do not repeat a vomited dose , Disp: , Rfl:     saccharomyces boulardii (FLORASTOR) 250 mg capsule, Take 1 capsule (250 mg total) by mouth 2 (two) times a day, Disp: , Rfl: 0    Sodium Chloride Flush (Normal Saline Flush) 0 9 % SOLN, , Disp: , Rfl:     venlafaxine (EFFEXOR) 75 mg tablet, Take 75 mg by mouth 3 (three) times a day  , Disp: , Rfl:      Multi 2x/day  Calcium 2x/day  B12 1x/day  Vitamin D  Folic acid  Iron  Probiotics 2x/day    Food Intake and Lifestyle Assessment   Food Intake Assessment:  · Pt was dx with endometrial cancer in 2019 and has seen 100# weight loss since  She is interested in at least maintaining her current weight  · Was started on Marinol 2x/day by pallative care, ran out, and got refilled on 1/21, but now feels appetite is better so hasn't restarted it--didn't discuss at today's visit   · Feels a little more depressed  Going to meet with psych for possible bump in meds  · Was eating every 2hrs, but now eating less often  · Feels when gets input from the "outside" goes against what was discussed/rebels--feels she needs to be in control    · Hasn't been using the miralax like should so a little more constipated, will get back on miralax  · Did make a schedule to follow  24hr recall:  Wakes:  8-9am   Breakfast: [id number] flips --NOW going to do vanilla yogurt w/fruit or cereal w/milk or raisin bread w/PBJ  Snack: 11am-nothing at this time yesterday  May try olives w/cheese OR fruit w/cheese   Lunch: yesterday-1pm-sweet potato + chicken salad w/ 1 slice of bread  Will continue the sandwich (lunch meat or chicken salad) OR soup  Snack: 3pm-feels had something but can't remember  Will start having an Unjury protein shake here  Dinner: 5-6pm-Does take out most often or something simple (lives with her mom who is 90 yrs old)-Last night was a roast beef sandwich  Will likely do the frozen meals or take out  Feels has the best appetite at this time  Pudding with meds--will maybe add protein powder  Snack: potato salad last night    Beverage intake: water, sugar free beverages and sometimes lemonade with strawberry unjury  Diet texture/stage: soft  Protein supplement: unjury  Estimated protein intake per day:  If does the unjury protein powder will meet protien needs--Hasn't tried these again since we last spoke  Estimated fluid intake per day: 32-48oz water, large unsweet tea from DD (will go back to mixing Miralax in this) and occasional lemonade with unjury  Meals eaten away from home: one a day at dinner is most often take out  Typical meal pattern: 3 meals per day and 3 snacks per day--eats about every 2 hrs--will get back to this  Eating Behaviors: Appropriate portion sizes    Food allergies or intolerances: crunchy foods are hard to eat right now do to her mouth sores  Cultural or Sabianist considerations: none    Physical Assessment  Nutrition Related Findings  Constipation--BM every 3-4 days  Was taking Miralax, but hasn't taking regularly (will get back to taking on a regular)  When bad will take lactulose    Mouth sores--needs softer foods, also going to try Biotin mouthwash    Physical Activity  Types of exercise: None  Current physical limitations: current with cancer dx    Psychosocial Assessment   Support systems: friend(s) relative(s)  Socioeconomic factors: retired    Nutrition Diagnosis  Diagnosis: Inadequate protein intake (NI-5 7  3)  Related to: Altered GI function and poor appetite due to cancer sx/chem meds  As Evidenced by: Unintentional weight loss     Interventions and Teaching   Patient educated on post-op nutrition guidelines  Patient educated and handouts provided  Capacity of post-surgery stomach  Adequate hydration  Nutrition considerations after surgery  Protein supplements  Meal planning and preparation  Appropriate carbohydrate, protein, and fat intake, and food/fluid choices to maximize safe weight loss, nutrient intake, and tolerance   Dietary and lifestyle changes  Techniques for self monitoring and keeping daily food journal  Vitamin / Mineral supplementation of Multivitamin with minerals, Calcium, Vitamin B12, Iron and Vitamin D    Education provided to: patient    Barriers to learning: No barriers identified    Readiness to change: action    Comprehension: verbalizes understanding     Expected Compliance: good    Pt presents today 16 years s/p RYGB  After surgery she got down to 200lbs, regained 30lbs, but in 2019 she was dx with endometrial cancer and has since lost 100lbs  She would like to stop losing weight, at least maintain weight  States she was doing well when she came in 2 weeks ago with eating about every two hours, but she has realized she is the type of person that when she is told to do something she rebels and doesn't  She feels she is just about getting in her three meals per day  Hasn't tried adding the protein shakes back in, has not been regular with taking miralax and not eating every 2hrs    She did talk to her therapist and came up the plan that she needs to be the one to come up with what is going to work best for her so she feels she is in control  Pt reports she has already set up a schedule with 3 meals + 3 snack, will make at least one snack a protein shake, will use pudding when taking meds which can add protein powder to and came up with meal ideas  Pt interested in f/u in 2 weeks        Goals  Food journal via written food log provided and bring to next appt  Eat 3 meals per day with protein at each meal  Eat every two hours  Add higher calorie healthier food options ie: avocado, peanut butter, hummus, etc to help better meet calorie needs  Goal 6052-2795 cals for weight maintenance/mild weight gain  Include one protein shake per day  Aim for 70-80gm protein per day  Add protein powder to pudding  Follow the plan she came up with   F/U with RD in 2 weeks     Time Spent:   30 Minutes

## 2022-02-08 ENCOUNTER — HOSPITAL ENCOUNTER (OUTPATIENT)
Dept: RADIOLOGY | Facility: HOSPITAL | Age: 67
Discharge: HOME | End: 2022-02-08
Attending: OBSTETRICS & GYNECOLOGY
Payer: COMMERCIAL

## 2022-02-08 DIAGNOSIS — C54.1 ENDOMETRIAL CANCER (HCC): Primary | ICD-10-CM

## 2022-02-08 DIAGNOSIS — C54.1 ENDOMETRIAL CANCER (HCC): ICD-10-CM

## 2022-02-08 PROCEDURE — 71260 CT THORAX DX C+: CPT

## 2022-02-08 PROCEDURE — 74177 CT ABD & PELVIS W/CONTRAST: CPT

## 2022-02-08 PROCEDURE — G1004 CDSM NDSC: HCPCS

## 2022-02-08 RX ADMIN — IOHEXOL 100 ML: 350 INJECTION, SOLUTION INTRAVENOUS at 10:12

## 2022-02-11 ENCOUNTER — OFFICE VISIT (OUTPATIENT)
Dept: GYNECOLOGIC ONCOLOGY | Facility: CLINIC | Age: 67
End: 2022-02-11
Payer: COMMERCIAL

## 2022-02-11 ENCOUNTER — HOSPITAL ENCOUNTER (OUTPATIENT)
Dept: INFUSION CENTER | Facility: HOSPITAL | Age: 67
Discharge: HOME | End: 2022-02-11
Payer: COMMERCIAL

## 2022-02-11 ENCOUNTER — DOCUMENTATION (OUTPATIENT)
Dept: OTHER | Facility: HOSPITAL | Age: 67
End: 2022-02-11

## 2022-02-11 VITALS
TEMPERATURE: 98.2 F | OXYGEN SATURATION: 99 % | DIASTOLIC BLOOD PRESSURE: 88 MMHG | SYSTOLIC BLOOD PRESSURE: 126 MMHG | HEART RATE: 97 BPM | HEIGHT: 59 IN | BODY MASS INDEX: 26 KG/M2 | WEIGHT: 129 LBS | RESPIRATION RATE: 16 BRPM

## 2022-02-11 VITALS — TEMPERATURE: 97 F

## 2022-02-11 DIAGNOSIS — E44.0 MODERATE PROTEIN-CALORIE MALNUTRITION (HCC): ICD-10-CM

## 2022-02-11 DIAGNOSIS — I82.4Y1 ACUTE DEEP VEIN THROMBOSIS (DVT) OF PROXIMAL VEIN OF RIGHT LOWER EXTREMITY (HCC): ICD-10-CM

## 2022-02-11 DIAGNOSIS — E83.42 HYPOMAGNESEMIA: ICD-10-CM

## 2022-02-11 DIAGNOSIS — T45.1X5A CHEMOTHERAPY INDUCED NEUTROPENIA (HCC): ICD-10-CM

## 2022-02-11 DIAGNOSIS — M80.00XS OSTEOPOROSIS WITH CURRENT PATHOLOGICAL FRACTURE, UNSPECIFIED OSTEOPOROSIS TYPE, SEQUELA: ICD-10-CM

## 2022-02-11 DIAGNOSIS — C54.1 ENDOMETRIAL CANCER (HCC): Primary | ICD-10-CM

## 2022-02-11 DIAGNOSIS — C54.1 ENDOMETRIAL CANCER (HCC): Primary | Chronic | ICD-10-CM

## 2022-02-11 DIAGNOSIS — N13.5 OBSTRUCTION OF RIGHT URETER: ICD-10-CM

## 2022-02-11 DIAGNOSIS — D70.1 CHEMOTHERAPY INDUCED NEUTROPENIA (HCC): ICD-10-CM

## 2022-02-11 LAB
ALBUMIN SERPL BCP-MCNC: 3.5 G/DL (ref 3.5–5)
ALP SERPL-CCNC: 159 U/L (ref 46–116)
ALT SERPL W P-5'-P-CCNC: 42 U/L (ref 12–78)
AMYLASE SERPL-CCNC: 31 IU/L (ref 25–115)
ANION GAP SERPL CALCULATED.3IONS-SCNC: 9 MMOL/L (ref 4–13)
APTT PPP: 53 SECONDS (ref 23–37)
AST SERPL W P-5'-P-CCNC: 35 U/L (ref 5–45)
BASOPHILS # BLD AUTO: 0.03 THOUSANDS/ΜL (ref 0–0.1)
BASOPHILS NFR BLD AUTO: 1 % (ref 0–1)
BILIRUB SERPL-MCNC: 0.47 MG/DL (ref 0.2–1)
BUN SERPL-MCNC: 22 MG/DL (ref 5–25)
CALCIUM SERPL-MCNC: 10 MG/DL (ref 8.3–10.1)
CHLORIDE SERPL-SCNC: 102 MMOL/L (ref 100–108)
CHOLEST SERPL-MCNC: 196 MG/DL
CO2 SERPL-SCNC: 22 MMOL/L (ref 21–32)
CREAT SERPL-MCNC: 1.02 MG/DL (ref 0.6–1.3)
EOSINOPHIL # BLD AUTO: 0.01 THOUSAND/ΜL (ref 0–0.61)
EOSINOPHIL NFR BLD AUTO: 0 % (ref 0–6)
ERYTHROCYTE [DISTWIDTH] IN BLOOD BY AUTOMATED COUNT: 13.9 % (ref 11.6–15.1)
GFR SERPL CREATININE-BSD FRML MDRD: 58 ML/MIN/1.73SQ M
GGT SERPL-CCNC: 12 U/L (ref 5–85)
GLUCOSE P FAST SERPL-MCNC: 91 MG/DL (ref 65–99)
GLUCOSE SERPL-MCNC: 91 MG/DL (ref 65–140)
HCT VFR BLD AUTO: 31 % (ref 34.8–46.1)
HGB BLD-MCNC: 9.9 G/DL (ref 11.5–15.4)
IMM GRANULOCYTES # BLD AUTO: 0.07 THOUSAND/UL (ref 0–0.2)
IMM GRANULOCYTES NFR BLD AUTO: 1 % (ref 0–2)
INR PPP: 1.06 (ref 0.84–1.19)
LIPASE SERPL-CCNC: 111 U/L (ref 73–393)
LYMPHOCYTES # BLD AUTO: 0.56 THOUSANDS/ΜL (ref 0.6–4.47)
LYMPHOCYTES NFR BLD AUTO: 9 % (ref 14–44)
MAGNESIUM SERPL-MCNC: 1.9 MG/DL (ref 1.6–2.6)
MCH RBC QN AUTO: 31.5 PG (ref 26.8–34.3)
MCHC RBC AUTO-ENTMCNC: 31.9 G/DL (ref 31.4–37.4)
MCV RBC AUTO: 99 FL (ref 82–98)
MONOCYTES # BLD AUTO: 0.68 THOUSAND/ΜL (ref 0.17–1.22)
MONOCYTES NFR BLD AUTO: 11 % (ref 4–12)
NEUTROPHILS # BLD AUTO: 5.06 THOUSANDS/ΜL (ref 1.85–7.62)
NEUTS SEG NFR BLD AUTO: 78 % (ref 43–75)
NRBC BLD AUTO-RTO: 0 /100 WBCS
PHOSPHATE SERPL-MCNC: 4.2 MG/DL (ref 2.3–4.1)
PLATELET # BLD AUTO: 232 THOUSANDS/UL (ref 149–390)
PMV BLD AUTO: 11.3 FL (ref 8.9–12.7)
POTASSIUM SERPL-SCNC: 4 MMOL/L (ref 3.5–5.3)
PROT SERPL-MCNC: 8.3 G/DL (ref 6.4–8.2)
PROTHROMBIN TIME: 13.4 SECONDS (ref 11.6–14.5)
RBC # BLD AUTO: 3.14 MILLION/UL (ref 3.81–5.12)
SODIUM SERPL-SCNC: 133 MMOL/L (ref 136–145)
T3 SERPL-MCNC: 1 NG/ML (ref 0.6–1.8)
T4 FREE SERPL-MCNC: 1.16 NG/DL (ref 0.76–1.46)
TSH SERPL DL<=0.05 MIU/L-ACNC: 1.94 UIU/ML (ref 0.36–3.74)
WBC # BLD AUTO: 6.41 THOUSAND/UL (ref 4.31–10.16)

## 2022-02-11 PROCEDURE — 83690 ASSAY OF LIPASE: CPT

## 2022-02-11 PROCEDURE — 84443 ASSAY THYROID STIM HORMONE: CPT

## 2022-02-11 PROCEDURE — 83735 ASSAY OF MAGNESIUM: CPT

## 2022-02-11 PROCEDURE — 85730 THROMBOPLASTIN TIME PARTIAL: CPT

## 2022-02-11 PROCEDURE — 85025 COMPLETE CBC W/AUTO DIFF WBC: CPT

## 2022-02-11 PROCEDURE — 1124F ACP DISCUSS-NO DSCNMKR DOCD: CPT | Performed by: NURSE PRACTITIONER

## 2022-02-11 PROCEDURE — 82150 ASSAY OF AMYLASE: CPT

## 2022-02-11 PROCEDURE — 85610 PROTHROMBIN TIME: CPT

## 2022-02-11 PROCEDURE — 82465 ASSAY BLD/SERUM CHOLESTEROL: CPT

## 2022-02-11 PROCEDURE — 84480 ASSAY TRIIODOTHYRONINE (T3): CPT

## 2022-02-11 PROCEDURE — 84439 ASSAY OF FREE THYROXINE: CPT

## 2022-02-11 PROCEDURE — 84100 ASSAY OF PHOSPHORUS: CPT

## 2022-02-11 PROCEDURE — 99214 OFFICE O/P EST MOD 30 MIN: CPT | Performed by: NURSE PRACTITIONER

## 2022-02-11 PROCEDURE — 82977 ASSAY OF GGT: CPT

## 2022-02-11 PROCEDURE — 80053 COMPREHEN METABOLIC PANEL: CPT

## 2022-02-11 RX ORDER — SODIUM CHLORIDE 9 MG/ML
20 INJECTION, SOLUTION INTRAVENOUS CONTINUOUS
Status: DISCONTINUED | OUTPATIENT
Start: 2022-02-14 | End: 2022-02-17 | Stop reason: HOSPADM

## 2022-02-11 NOTE — PROGRESS NOTES
Labs have been reviewed and signed by RAFITA Khan on 2/11/2022   Per protocol, patient is ok to proceed with Cycle 21 Day 1 treatment on Monday 2/14/22

## 2022-02-11 NOTE — PROGRESS NOTES
Assessment/Plan:    Problem List Items Addressed This Visit        Cardiovascular and Mediastinum    Acute deep vein thrombosis (DVT) of proximal vein of lower extremity (HCC)     Will need anticoagulation for life given active malignancy  Genitourinary    Endometrial cancer ([facility] [address] ) - Primary (Chronic)     61year-old with recurrent stage IB endometrial cancer who is receiving treatment per the [facility] trial  She has received 20 cycles  She has lost an additional 6lbs since her last visit  Her performance status is 1  Labs reviewed and are WNL for treatment on Monday  Elevation in AST and ALT noted; value remains < 3x the upper limit of normal  F/u CT planned for February 2022  Obstruction of right ureter     Right PCN draining appropriately  Other    Moderate protein-calorie malnutrition ([facility] [address] )     Patient has history of bariatric surgery in 2001  She has been meeting with the bariatric dietitian  Next appt is 2/24  I encouraged her to keep this appointment  Will reach out to oncology dietitian to possibly see patient when she is having chemotherapy on Monday  Prior to starting the EndoBarr trial in December 2020, she weighed 199 lbs  Today, she weighs 129 lbs  BMI is 26 5  There is temporal wasting noted  CHIEF COMPLAINT: Pre-chemo cycle #21 EndoBarr trial      Problem:  Cancer Staging  Endometrial cancer [facility])  Staging form: Corpus Uteri - Carcinoma, AJCC 7th Edition  - Clinical stage from 12/19/2017: FIGO Stage IB (T1b, N0, M0) - Signed by Ish Beck MD on 2/12/2018        Previous therapy:  Oncology History   Endometrial cancer ([facility] [address] )   11/17/2017 Initial Diagnosis    Endometrial cancer ([facility] [address] )     11/17/2017 Biopsy    ENDOMETRIAL BIOPSY: WELL DIFFERENTIATED ENDOMETRIAL ADENOCARCINOMA (FIGO I) WITH FOCALMUCINOUS FEATURES      Part B: ENDOCERVICAL POLYP:BENIGN ENDOCERVICAL      12/19/2017 Surgery    Robotic assisted total laparoscopic hysterectomy with bilateral salpingo-oophorectomy and sentinel bilateral pelvic lymph node dissection  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 (4 4 x 3 2 cm tumor, 9 4/15 4mm invasion, NO LVSI, washings revealed atypical cellular changes)     12/19/2017 Genetic Testing    Morrison testing negative     6/28/2019 Biopsy    A  Breast, Right, US BX Right Breast 1000 4 cmfn:  - Benign breast tissue with focal histiocytic aggregate  See comment   - Negative for atypia and in-situ or invasive carcinoma  7/8/2019 Recurrence    Presented with right lower extremity DVT and CT demonstrating right pelvic sidewall mass with venous, ureteral and nerve compression causing significant neuropathic pain  Biopsy:  Lymph Node, Right pelvic lymph node x3:  - High-grade adenocarcinoma  7/30/2019 - 1/6/2020 Chemotherapy    Taxol 175 mg/m2 and carboplatin AUC 6 every 21 days  Dose was reduced to taxol 135 mg/m2 and carboplatin AUC 5  Completed 6 cycles  Treatment protracted due to multiple hospital admissions  2/24/2020 - 4/13/2020 Radiation    adjuvant external beam radiation therapy to the whole pelvis to 4500 cGy followed by boost to gross disease of an additional 2200 cGy     11/23/2020 Progression    New necrotic adenopathy in the retroperitoneum on CT      12/21/2020 -  Chemotherapy    Began chemotherapy with rucaparib, atezolizumab, and bevacizumab as per [facility] clinical trial            Patient ID: Radha Pedro is a 61 y o  female     Ragini is feeling about the same since her last visit  She has nausea/"dry heaves," mostly when exerting herself  Her appetite is fair to poor  Normal bowel and bladder function  Continues to take Miralax for constipation  She denies abdominal or pelvic pain  She is without vaginal bleeding or discharge  She is ambulatory with a cane        The following portions of the patient's history were reviewed and updated as appropriate: allergies, current medications, past family history, past medical history, past social history, past surgical history and problem list     Review of Systems   Constitutional: Positive for fatigue  Negative for activity change, appetite change, chills, fever and unexpected weight change  HENT: Negative for mouth sores  Eyes: Negative  Respiratory: Negative for cough, chest tightness, shortness of breath and wheezing  Cardiovascular: Negative for chest pain, palpitations and leg swelling  Gastrointestinal: Negative for abdominal distention, abdominal pain, anal bleeding, blood in stool, constipation, diarrhea, nausea and vomiting  Endocrine: Negative  Genitourinary: Negative for difficulty urinating, dysuria, flank pain, frequency, hematuria, pelvic pain, urgency, vaginal bleeding, vaginal discharge and vaginal pain  Musculoskeletal: Negative for arthralgias and myalgias  Skin: Negative for color change, pallor and rash  Neurological: Negative for dizziness, weakness, numbness and headaches  Hematological: Negative  Psychiatric/Behavioral: The patient is not nervous/anxious          Current Outpatient Medications   Medication Sig Dispense Refill    acetaminophen-codeine (TYLENOL #3) 300-30 mg per tablet Take 1 tablet by mouth every 6 (six) hours as needed for moderate pain 20 tablet 0    ARIPiprazole (ABILIFY) 10 mg tablet Take 10 mg by mouth daily      aspirin (ECOTRIN LOW STRENGTH) 81 mg EC tablet Take 81 mg by mouth daily      Calcium-Magnesium-Vitamin D (CALCIUM 500 PO) Take by mouth daily       cholecalciferol (VITAMIN D3) 1,000 units tablet Take 1,000 Units by mouth daily      [employer] Melatonin 3 MG TAKE 1 TABLET (3 MG TOTAL) BY MOUTH DAILY AT BEDTIME 90 tablet 1    Cyanocobalamin (VITAMIN B12 PO) Take by mouth daily       dronabinol (MARINOL) 2 5 mg capsule Take 1 capsule (2 5 mg total) by mouth 2 (two) times a day before meals 60 capsule 0    folic acid (FOLVITE) 1 mg tablet Take 1 tablet (1 mg total) by mouth daily  0  gemfibrozil (LOPID) 600 mg tablet TAKE 1 TABLET BY MOUTH EVERY DAY 90 tablet 1    lactulose (CHRONULAC) 10 g/15 mL solution TAKE 45 ML (30 G TOTAL) BY MOUTH 2 (TWO) TIMES A DAY (Patient taking differently: as needed TAKE 45 ML (30 G TOTAL) BY MOUTH 2 (TWO) TIMES A DAY) 300 mL 2    LORazepam (ATIVAN) 0 5 mg tablet Take 1 tablet (0 5 mg total) by mouth every 6 (six) hours as needed for anxiety (or nausea) 36 tablet 1    Multiple Vitamin (MULTIVITAMIN) tablet Take 1 tablet by mouth daily      naloxone (NARCAN) 4 mg/0 1 mL nasal spray Administer 1 spray into a nostril  If no response after 2-3 minutes, give another dose in the other nostril using a new spray  1 each 0    omeprazole (PriLOSEC) 20 mg delayed release capsule Take 20 mg by mouth daily      ondansetron (ZOFRAN) 8 mg tablet Take 1 tablet (8 mg total) by mouth every 8 (eight) hours as needed for nausea or vomiting 30 tablet 1    oxybutynin (DITROPAN XL) 15 MG 24 hr tablet TAKE 1 TABLET BY MOUTH DAILY AT BEDTIME 90 tablet 3    phenazopyridine (PYRIDIUM) 200 mg tablet Take 1 tablet (200 mg total) by mouth daily as needed for bladder spasms 30 tablet 0    quinapril (ACCUPRIL) 5 mg tablet TAKE 1 TABLET BY MOUTH EVERYDAY AT BEDTIME 90 tablet 0    rucaparib (RUBRACA) 300 mg tablet Take 600 mg by mouth every 12 (twelve) hours Take with or without food  Do not repeat a vomited dose        saccharomyces boulardii (FLORASTOR) 250 mg capsule Take 1 capsule (250 mg total) by mouth 2 (two) times a day  0    venlafaxine (EFFEXOR) 75 mg tablet Take 75 mg by mouth 3 (three) times a day        ciprofloxacin (CIPRO) 500 mg tablet TAKE 1 TABLET BY MOUTH 1 HOUR PRIOR TO TUBE CHANGE (Patient not taking: Reported on 1/21/2022)      enoxaparin (LOVENOX) 100 mg/mL INJECT 1 ML (100 MG TOTAL) UNDER THE SKIN EVERY 24 HOURS 30 mL 5    Sodium Chloride Flush (Normal Saline Flush) 0 9 % SOLN  (Patient not taking: Reported on 11/16/2021 )       No current facility-administered medications for this visit  Objective:    Blood pressure 126/88, pulse 97, temperature 98 2 °F (36 8 °C), temperature source Temporal, resp  rate 16, height 4' 10 5" (1 486 m), weight 58 5 kg (129 lb), SpO2 99 %, not currently breastfeeding  Body mass index is 26 5 kg/m²  Body surface area is 1 52 meters squared  Physical Exam  Vitals reviewed  Constitutional:       General: She is not in acute distress  Appearance: Normal appearance  She is underweight  She is not ill-appearing  HENT:      Head: Normocephalic and atraumatic  Mouth/Throat:      Mouth: Mucous membranes are moist    Eyes:      General: No scleral icterus  Right eye: No discharge  Left eye: No discharge  Conjunctiva/sclera: Conjunctivae normal    Pulmonary:      Effort: Pulmonary effort is normal    Musculoskeletal:      Right lower leg: No edema  Left lower leg: No edema  Skin:     General: Skin is warm and dry  Coloration: Skin is not jaundiced  Findings: No rash  Neurological:      General: No focal deficit present  Mental Status: She is alert and oriented to person, place, and time  Cranial Nerves: No cranial nerve deficit  Sensory: No sensory deficit  Motor: No weakness  Gait: Gait normal    Psychiatric:         Mood and Affect: Mood normal          Behavior: Behavior normal          Thought Content:  Thought content normal          Judgment: Judgment normal          No results found for:   Lab Results   Component Value Date     10/27/2017    K 4 0 02/11/2022     02/11/2022    CO2 22 02/11/2022    BUN 22 02/11/2022    CREATININE 1 02 02/11/2022    GLUCOSE 219 (H) 12/19/2017    GLUF 91 02/11/2022    CALCIUM 10 0 02/11/2022    CORRECTEDCA 10 0 01/21/2022    AST 35 02/11/2022    ALT 42 02/11/2022    ALKPHOS 159 (H) 02/11/2022    PROT 6 9 10/27/2017    BILITOT 0 3 10/27/2017    EGFR 58 02/11/2022     Lab Results   Component Value Date    WBC 6 41 02/11/2022    HGB 9 9 (L) 02/11/2022    HCT 31 0 (L) 02/11/2022    MCV 99 (H) 02/11/2022     02/11/2022     Lab Results   Component Value Date    NEUTROABS 5 06 02/11/2022        Trend:  No results found for:       Radiology:  CT CHEST, ABDOMEN AND PELVIS WITH IV CONTRAST     INDICATION:   C54 1: Malignant neoplasm of endometrium      COMPARISON:  Multiple prior examinations most recently December 8, 2021      TECHNIQUE: CT examination of the chest, abdomen and pelvis was performed  In addition to portal venous phase postcontrast scanning through the abdomen and pelvis, delayed phase postcontrast scanning was performed through the upper abdominal viscera  Axial, sagittal, and coronal 2D reformatted images were created from the source data and submitted for interpretation      Radiation dose length product (DLP) for this visit:  532 42 mGy-cm   This examination, like all CT scans performed in the [facility], was performed utilizing techniques to minimize radiation dose exposure, including the use of iterative   reconstruction and automated exposure control      IV Contrast:  100 mL of iohexol (OMNIPAQUE)  Enteric Contrast: Enteric contrast was administered      FINDINGS:     RECIST TARGET LESIONS:      1   Upper aortocaval node, 1 9 x 1 4 on image 59 of series 2, not significantly changed from 1 9 x 1 3 cm on previous examination      2  Mid aortocaval node, 1 2 x 0 8 on image 62 of series 2, not significantly changed from 1 1 x 0 7 cm on previous examination      RECIST NON-TARGET LESIONS:      Right retrocrural node on image 39 of series 2, barely detectable approximately 1 to 2 mm in short axis, slightly decreased in size from previous examination    A nearby posterior mediastinal node on image 35 of series 2 is 5 mm in short axis slightly   decreased from 7 mm on image 40 of series 2 previous examination      FINDINGS:     CHEST     LUNGS:  Lingular atelectasis/scarring is reidentified  Previously noted groundglass parenchymal density has resolved  Lungs are otherwise clear  Specifically there is no suspicious pulmonary nodule or mass  No tracheal or endobronchial lesion      PLEURA:  Unremarkable      HEART/GREAT VESSELS:  Trace pericardial effusion similar from previous examination      MEDIASTINUM AND KATIANA:  No mediastinal or hilar lymphadenopathy is identified  Again noted is contrast and gaseous distention of the esophagus without evidence of esophageal wall thickening      CHEST WALL AND LOWER NECK:   Left chest port, catheter extends to the cavoatrial junction  No axillary lymphadenopathy      ABDOMEN     LIVER/BILIARY TREE:  Unremarkable  Specifically, no hepatic mass and normal hepatic contours      GALLBLADDER:  The patient is status post cholecystectomy         SPLEEN:  Unremarkable      PANCREAS:  Unremarkable      ADRENAL GLANDS:  Unremarkable      KIDNEYS/URETERS:  Right-sided nephroureteral stent is reidentified  There is no hydronephrosis  Right renal cortical thinning is reidentified  There is an exophytic cyst at the posterior upper pole the right kidney, unchanged      STOMACH AND BOWEL:  Surgical changes of prior Edgardo-en-Y gastric bypass surgery are identified  No abnormal bowel wall thickening  Again noted is a larger than typical volume of stool in the colon in keeping with reported history of constipation      APPENDIX:  The appendix is not well seen      ABDOMINOPELVIC CAVITY:  No ascites  No pneumoperitoneum  Retroperitoneal lymphadenopathy is reidentified, target lesions as measured above      Upper retroperitoneal lymph nodes are reidentified  The larger of these is slightly decreased in size when compared to prior examination  Otherwise no significant interval change in the size of retroperitoneal nodes  No new or enlarging   retroperitoneal, pelvic, or inguinal lymphadenopathy    No developing omental or peritoneal mass        Unchanged appearance of subtle ill-defined soft tissue density along the margin of the stent at site just below the level of the pelvic inlet in the right hemipelvis on image 87, unchanged from prior examinations and from prior PET study January 27, 2020   where some increased activity was noted in the location  This is of uncertain etiology but could represent the sequela of ureteral reimplantation      VESSELS:  Unremarkable for patient's age      PELVIS     REPRODUCTIVE ORGANS:  Surgical changes of prior hysterectomy  No new or enlarging pelvic mass      URINARY BLADDER:  Right nephroureteral stent extends into the urinary bladder lumen  Bladder is collapsed but otherwise unremarkable      ABDOMINAL WALL/INGUINAL REGIONS:  Multiple areas of increased density in the subcutaneous tissues, most consistent with injection sites      OSSEOUS STRUCTURES:  Sclerosis in the upper sacrum bilaterally similar from previous exam suspicious for insufficiency fractures possibly due to radiation therapy  Similarly, there is sclerosis on the right side of the pubic symphysis, unchanged from   most recent prior examination with associated bony deformity suggesting the sequela of remote prior nondisplaced fracture  Unchanged wedge compression deformity at L5 with associated sclerosis  No new destructive or sclerotic osseous lesions  No new   fractures      IMPRESSION:     Small upper retroperitoneal nodes not significantly changed from previous examination  Target lesions as above    No new, enlarging, or otherwise suspicious solid mass in the chest, abdomen or pelvis      Interval resolution of previously noted groundglass changes in the left upper lobe consistent with resolution of infectious/inflammatory or post infectious/post inflammatory process

## 2022-02-11 NOTE — ASSESSMENT & PLAN NOTE
Patient has history of bariatric surgery in 2001  She has been meeting with the bariatric dietitian  Next appt is 2/24  I encouraged her to keep this appointment  Will reach out to oncology dietitian to possibly see patient when she is having chemotherapy on Monday  Prior to starting the EndoBarr trial in December 2020, she weighed 199 lbs  Today, she weighs 129 lbs  BMI is 26 5  There is temporal wasting noted

## 2022-02-11 NOTE — ASSESSMENT & PLAN NOTE
61year-old with recurrent stage IB endometrial cancer who is receiving treatment per the [facility] trial  She has received 20 cycles  She has lost an additional 6lbs since her last visit  Her performance status is 1  Labs reviewed and are WNL for treatment on Monday  Elevation in AST and ALT noted; value remains < 3x the upper limit of normal  F/u CT planned for February 2022

## 2022-02-11 NOTE — PROGRESS NOTES
Patient was seen in the office for her Cycle 21 Day 1 pre treatment office visit For Endo[facility] with Harsh Khan  Patient states that she has been feeling Tired a little more than usually the past few days   She hasn't been eating well again  She states that her appetite has been decreased again the past couple days  Constipation is still present  but is tolerable with miralax   Patient will continue with  palliative care as well as bariatrics and the nutrition   All AEs and Conmeds were reviewed   Patient denied changes to her conmeds   Future appointments were reviewed  Overall, patient states that she is feeling okay, and was told to call with any questions or concerns

## 2022-02-12 ENCOUNTER — APPOINTMENT (OUTPATIENT)
Dept: LAB | Facility: CLINIC | Age: 67
End: 2022-02-12

## 2022-02-12 LAB
BACTERIA UR QL AUTO: ABNORMAL /HPF
BILIRUB UR QL STRIP: NEGATIVE
CLARITY UR: CLEAR
COLOR UR: YELLOW
CREAT UR-MCNC: 13 MG/DL
GLUCOSE UR STRIP-MCNC: NEGATIVE MG/DL
HGB UR QL STRIP.AUTO: ABNORMAL
KETONES UR STRIP-MCNC: NEGATIVE MG/DL
LEUKOCYTE ESTERASE UR QL STRIP: ABNORMAL
NITRITE UR QL STRIP: POSITIVE
NON-SQ EPI CELLS URNS QL MICRO: ABNORMAL /HPF
PH UR STRIP.AUTO: 6 [PH]
PROT UR STRIP-MCNC: NEGATIVE MG/DL
PROT UR-MCNC: 15 MG/DL
PROT/CREAT UR: 1.15 MG/G{CREAT} (ref 0–0.1)
RBC #/AREA URNS AUTO: ABNORMAL /HPF
SP GR UR STRIP.AUTO: <=1.005 (ref 1–1.03)
UROBILINOGEN UR QL STRIP.AUTO: 0.2 E.U./DL
WBC #/AREA URNS AUTO: ABNORMAL /HPF

## 2022-02-12 PROCEDURE — 81001 URINALYSIS AUTO W/SCOPE: CPT

## 2022-02-12 PROCEDURE — 84156 ASSAY OF PROTEIN URINE: CPT

## 2022-02-12 PROCEDURE — 82570 ASSAY OF URINE CREATININE: CPT

## 2022-02-14 ENCOUNTER — DOCUMENTATION (OUTPATIENT)
Dept: OTHER | Facility: HOSPITAL | Age: 67
End: 2022-02-14

## 2022-02-14 ENCOUNTER — HOSPITAL ENCOUNTER (OUTPATIENT)
Dept: INFUSION CENTER | Facility: CLINIC | Age: 67
Discharge: HOME | End: 2022-02-14
Payer: COMMERCIAL

## 2022-02-14 VITALS
TEMPERATURE: 97 F | OXYGEN SATURATION: 99 % | WEIGHT: 126 LBS | DIASTOLIC BLOOD PRESSURE: 66 MMHG | HEIGHT: 59 IN | BODY MASS INDEX: 25.4 KG/M2 | RESPIRATION RATE: 16 BRPM | HEART RATE: 66 BPM | SYSTOLIC BLOOD PRESSURE: 106 MMHG

## 2022-02-14 PROCEDURE — J9035Q0 INV BEVACIZUMAB 400MG/16ML 16 ML: Performed by: NURSE PRACTITIONER

## 2022-02-14 PROCEDURE — 96413 CHEMO IV INFUSION 1 HR: CPT

## 2022-02-14 PROCEDURE — J9999Q0 INV ATEZOLIZUMAB 1200MG/20ML 20 ML: Performed by: OBSTETRICS & GYNECOLOGY

## 2022-02-14 PROCEDURE — 96417 CHEMO IV INFUS EACH ADDL SEQ: CPT

## 2022-02-14 RX ADMIN — Medication 878 MG: at 09:49

## 2022-02-14 RX ADMIN — Medication 1200 MG: at 09:11

## 2022-02-14 RX ADMIN — SODIUM CHLORIDE 20 ML/HR: 0.9 INJECTION, SOLUTION INTRAVENOUS at 08:30

## 2022-02-14 NOTE — PROGRESS NOTES
Pt resting with complaints of some fatigue and mouth sores  Clinical trials notified  Vitals stable; labs drawn and okay for treatment per Clinical Trials  Callbell within reach; will continue to monitor

## 2022-02-14 NOTE — PROGRESS NOTES
Patient was seen in [address] [place] [address] for [address] treatment  Patient was given Cycle 21 Rucaparib pills and drug diary, and patient returned Cycle 20 study pills, and diary   Patient denied any changes to AE'S and conmeds since office visit on 2/11/22, but she did state that she is experiencing dry mouth   She also said she had a root canal and now has a few mouth sores on the side she had the tooth worked on and they are still present along with sensitivity   Discussed with patient her outreaching to her dentist for the tooth sensitivity  CRN did discuss salt water rinses as well as biotene mouth wash   Patient was instructed to call if they get worse or she has no relief from them in a few days     Patient was told to call with any questions or concerns, Patient tolerates treatment without incident

## 2022-02-15 ENCOUNTER — TELEPHONE (OUTPATIENT)
Dept: NUTRITION | Facility: CLINIC | Age: 67
End: 2022-02-15

## 2022-02-15 NOTE — TELEPHONE ENCOUNTER
Notified by Tereza Joaquin, that pt continues to experience weight loss  Pt has been closely followed by the oncology RD for the past 3 years  Her next follow up is 2/23  Please see prior RD notes  Per chart review, pt is now following with the bariatric RD as well, last appts: 1/25 and 2/7  Pt referred to bariatric RD by Dr Jesus Alegre in the past     Call placed to pt to check in and discuss her nutrition and plan moving forward  Spoke with pt and discussed the reason for my call  Pt reports that she has been having extensive sessions with her therapist  She says that the minute someone tells her to "do this" she wants to "do the opposite" as a means to take back control  She shared that she suffers from depression which she is also working on with her psychiatrist who recently increased her medication (abilify) and she says she has been feeling better for the past 2-3 days  She is trying to "take control" of her nutrition by putting all of the suggestions from her RD's together, picking what she wants to follow, and making a daily schedule for herself  She continues to have mouth sores for which she started Biotene mouthwash which is helping already  Supportive listening and encouragement provided to [facility] today  Asked if there is anything I can do to support her at this time; she requested that I e-mail her some milkshake recipes  E-mailed pt (@ [email or web address]') the Oncology Nutrition Milkshake and Smoothie Recipes handout  Ragini agreed that she knows what she needs to do to improve her nutritional status, but it is a matter of putting it into practice and overcoming her barriers  She feels that her depression and desire to control things are her biggest limiting factors at this point, and she is working on these with her therapist   At this point, I will continue to support Ragini to the extent that she wishes, without undue pressure      Reviewed follow up appt for next week  She reports that this will be a good time to follow up as she will have about a week to put her new plan into place  She will also be following up with the bariatric RD on 2/24  All questions and concerns addressed at this time  Will make pts care team aware of today's call so that we can all support her efforts to improve her health

## 2022-02-15 NOTE — TELEPHONE ENCOUNTER
----- Message from Nanette Up, [address] sent at 2/11/2022  9:35 AM EST -----  Good morning,    I believe this patient has been seen before by you, but I wanted to again bring her to your attention and see if someone might be able to see her at the [facility] infusion center on Monday when she is there for treatment? She continues to lose weight while on chemotherapy  She has lost about 70 lbs in ~18 months while on treatment  She uses protein powder, supplements, etc  but has lost about 6 additional lbs since our last visit on 1/21      Thank you for your help,     Sophy Flores

## 2022-02-15 NOTE — PROGRESS NOTES
Outpatient Oncology Nutrition Consult  Type of Consult: Follow Up  Care Location: Telephone Call   Nutrition Assessment:  Oncology Diagnosis & Treatments:   · Endometrial cancer  · S/p surgery 12/19/17  · Recurrence 7/8/19  · S/p chemotherapy (carboplatin/taxol from 7/30/19-1/6/20)  · S/p whole pelvis RT (2/4/20- 4/13/20)  · Disease progression  · ENDOBAR trial started 12/21/20    Oncology History   Endometrial cancer ([facility] [address] )   11/17/2017 Initial Diagnosis    Endometrial cancer ([facility] [address] )     11/17/2017 Biopsy    ENDOMETRIAL BIOPSY: WELL DIFFERENTIATED ENDOMETRIAL ADENOCARCINOMA (FIGO I) WITH FOCALMUCINOUS FEATURES  Part B: ENDOCERVICAL POLYP:BENIGN ENDOCERVICAL      12/19/2017 Surgery    Robotic assisted total laparoscopic hysterectomy with bilateral salpingo-oophorectomy and sentinel bilateral pelvic lymph node dissection  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 (4 4 x 3 2 cm tumor, 9 4/15 4mm invasion, NO LVSI, washings revealed atypical cellular changes)     12/19/2017 Genetic Testing    Morrison testing negative     6/28/2019 Biopsy    A  Breast, Right, US BX Right Breast 1000 4 cmfn:  - Benign breast tissue with focal histiocytic aggregate  See comment   - Negative for atypia and in-situ or invasive carcinoma  7/8/2019 Recurrence    Presented with right lower extremity DVT and CT demonstrating right pelvic sidewall mass with venous, ureteral and nerve compression causing significant neuropathic pain  Biopsy:  Lymph Node, Right pelvic lymph node x3:  - High-grade adenocarcinoma  7/30/2019 - 1/6/2020 Chemotherapy    Taxol 175 mg/m2 and carboplatin AUC 6 every 21 days  Dose was reduced to taxol 135 mg/m2 and carboplatin AUC 5  Completed 6 cycles  Treatment protracted due to multiple hospital admissions       2/24/2020 - 4/13/2020 Radiation    adjuvant external beam radiation therapy to the whole pelvis to 4500 cGy followed by boost to gross disease of an additional 2200 cGy 11/23/2020 Progression    New necrotic adenopathy in the retroperitoneum on CT      12/21/2020 -  Chemotherapy    Began chemotherapy with rucaparib, atezolizumab, and bevacizumab as per [facility] clinical trial        PMH: RNYGB (2001 at [address])    Past Medical History:   Diagnosis Date    Anemia     Chemotherapy follow-up examination     Depression     Endometrial cancer ([address] ) 12/2017    Hyperglycemia     vx type 2 dm -- last assessed 4/1/14; resolved 11/7/17    Hyperlipidemia     Hypertension     Obesity     last assessed 10/14/17; resolved 11/7/17     Past Surgical History:   Procedure Laterality Date    ABDOMINAL SURGERY      GASTRIC BYPASS    CHOLECYSTECTOMY      at the time of gastric bypass    COLONOSCOPY      CT NEEDLE BIOPSY LYMPH NODE  7/8/2019    FL GUIDED CENTRAL VENOUS ACCESS DEVICE INSERTION  11/12/2019    GASTRIC BYPASS      HYSTERECTOMY Bilateral     total abdominal with salpingo-oophorectomy    IR NEPHROSTOMY TUBE PLACEMENT  7/26/2019    IR NEPHROURETERAL STENT CHECK/CHANGE/REPOSITION  4/6/2021    IR NEPHROURETERAL STENT CHECK/CHANGE/REPOSITION  6/4/2021    IR NEPHROURETERAL STENT CHECK/CHANGE/REPOSITION  9/3/2021    IR NEPHROURETERAL STENT CHECK/CHANGE/REPOSITION  12/7/2021    IR PICC LINE  9/27/2019    IR PORT PLACEMENT  7/26/2019    IR PORT REMOVAL  9/20/2019    OOPHORECTOMY Bilateral     MS INSJ TUNNELED CTR VAD W/SUBQ PORT AGE 5 YR/> Left 11/12/2019    Procedure: INSERTION VENOUS PORT ( PORT-A-CATH) IR;  Surgeon: Johnnie Mills DO;  [facility];  Service: Interventional Radiology    MS LAP, RADICAL HYST W/ TUBE&OV, NODE BX N/A 12/19/2017    Procedure: HYSTERECTOMY LAPAROSCOPIC TOTAL ([address]) W/ ROBOTICS; BILATERAL SALPINGOOPHERECTOMY; LYMPH NODE DISSECTION; lysis of adhesions;  Surgeon: Jonny Miranda MD;  [facility]: [facility];  Service: Gynecology Oncology    MS LAP,DIAGNOSTIC ABDOMEN N/A 12/19/2017    Procedure: LAPAROSCOPY DIAGNOSTIC;  Surgeon: Riley Kramer Sury Moser MD;  [facility];  Service: Gynecology Oncology    TONSILLECTOMY      US GUIDED BREAST BIOPSY RIGHT COMPLETE Right 6/28/2019       Review of Medications:   Vitamins, Supplements and Herbals: yes: Hx RNYGB regimen: Vitamin D, Folic Acid, Align, calcium citrate TID, B12 1000 mcg QD, MVI BID (MVI does not have iron);  Iron 65 mg QD (2 hrs seperate from calcium)    Current Outpatient Medications:     acetaminophen-codeine (TYLENOL #3) 300-30 mg per tablet, Take 1 tablet by mouth every 6 (six) hours as needed for moderate pain, Disp: 20 tablet, Rfl: 0    ARIPiprazole (ABILIFY) 10 mg tablet, Take 20 mg by mouth daily  , Disp: , Rfl:     aspirin (ECOTRIN LOW STRENGTH) 81 mg EC tablet, Take 81 mg by mouth daily, Disp: , Rfl:     Calcium-Magnesium-Vitamin D (CALCIUM 500 PO), Take by mouth daily , Disp: , Rfl:     cholecalciferol (VITAMIN D3) 1,000 units tablet, Take 1,000 Units by mouth daily, Disp: , Rfl:     ciprofloxacin (CIPRO) 500 mg tablet, TAKE 1 TABLET BY MOUTH 1 HOUR PRIOR TO TUBE CHANGE (Patient not taking: Reported on 1/21/2022), Disp: , Rfl:     [employer] Melatonin 3 MG, TAKE 1 TABLET (3 MG TOTAL) BY MOUTH DAILY AT BEDTIME (Patient not taking: Reported on 2/17/2022), Disp: 90 tablet, Rfl: 1    Cyanocobalamin (VITAMIN B12 PO), Take by mouth daily , Disp: , Rfl:     dronabinol (MARINOL) 2 5 mg capsule, Take 1 capsule (2 5 mg total) by mouth 2 (two) times a day before meals, Disp: 60 capsule, Rfl: 0    enoxaparin (LOVENOX) 100 mg/mL, INJECT 1 ML (100 MG TOTAL) UNDER THE SKIN EVERY 24 HOURS, Disp: 30 mL, Rfl: 5    folic acid (FOLVITE) 1 mg tablet, Take 1 tablet (1 mg total) by mouth daily, Disp: , Rfl: 0    gemfibrozil (LOPID) 600 mg tablet, TAKE 1 TABLET BY MOUTH EVERY DAY, Disp: 90 tablet, Rfl: 1    lactulose (CHRONULAC) 10 g/15 mL solution, TAKE 45 ML (30 G TOTAL) BY MOUTH 2 (TWO) TIMES A DAY (Patient taking differently: as needed TAKE 45 ML (30 G TOTAL) BY MOUTH 2 (TWO) TIMES A DAY), Disp: 300 mL, Rfl: 2    LORazepam (ATIVAN) 0 5 mg tablet, Take 1 tablet (0 5 mg total) by mouth every 6 (six) hours as needed for anxiety (or nausea), Disp: 36 tablet, Rfl: 1    Multiple Vitamin (MULTIVITAMIN) tablet, Take 1 tablet by mouth daily, Disp: , Rfl:     naloxone (NARCAN) 4 mg/0 1 mL nasal spray, Administer 1 spray into a nostril  If no response after 2-3 minutes, give another dose in the other nostril using a new spray , Disp: 1 each, Rfl: 0    omeprazole (PriLOSEC) 20 mg delayed release capsule, Take 20 mg by mouth daily, Disp: , Rfl:     ondansetron (ZOFRAN) 8 mg tablet, Take 1 tablet (8 mg total) by mouth every 8 (eight) hours as needed for nausea or vomiting, Disp: 30 tablet, Rfl: 1    oxybutynin (DITROPAN XL) 15 MG 24 hr tablet, TAKE 1 TABLET BY MOUTH DAILY AT BEDTIME, Disp: 90 tablet, Rfl: 3    phenazopyridine (PYRIDIUM) 200 mg tablet, Take 1 tablet (200 mg total) by mouth daily as needed for bladder spasms, Disp: 30 tablet, Rfl: 0    quinapril (ACCUPRIL) 5 mg tablet, TAKE 1 TABLET BY MOUTH EVERYDAY AT BEDTIME, Disp: 90 tablet, Rfl: 0    rucaparib (RUBRACA) 300 mg tablet, Take 600 mg by mouth every 12 (twelve) hours Take with or without food    Do not repeat a vomited dose , Disp: , Rfl:     saccharomyces boulardii (FLORASTOR) 250 mg capsule, Take 1 capsule (250 mg total) by mouth 2 (two) times a day, Disp: , Rfl: 0    Sodium Chloride Flush (Normal Saline Flush) 0 9 % SOLN, , Disp: , Rfl:     venlafaxine (EFFEXOR) 75 mg tablet, Take 75 mg by mouth 3 (three) times a day  , Disp: , Rfl:     Most Recent Lab Results:  Lab Results   Component Value Date    WBC 6 41 02/11/2022    IRON 92 11/19/2021    TIBC 374 11/19/2021    FERRITIN 1,429 (H) 11/19/2021    CHOLESTEROL 196 02/11/2022    CHOL 183 10/27/2017    TRIG 88 11/01/2018    HDL 59 11/01/2018    LDLCALC 116 (H) 11/01/2018    ALT 42 02/11/2022    AST 35 02/11/2022    ALB 3 5 02/11/2022     10/27/2017     05/12/2017    SODIUM 133 (L) 02/11/2022    SODIUM 135 (L) 01/21/2022    K 4 0 02/11/2022    K 3 6 01/21/2022     02/11/2022    BUN 22 02/11/2022    BUN 24 01/21/2022    CREATININE 1 02 02/11/2022    CREATININE 1 05 01/21/2022    EGFR 58 02/11/2022    PHOS 4 2 (H) 02/11/2022    PHOS 3 7 01/21/2022    GLUCOSE 219 (H) 12/19/2017    POCGLU 97 08/18/2020    GLUF 91 02/11/2022    GLUF 105 (H) 04/02/2021    GLUC 91 02/11/2022    HGBA1C 4 8 07/07/2021    HGBA1C 5 1 02/03/2020    HGBA1C 6 2 08/28/2019    CALCIUM 10 0 02/11/2022    FOLATE 5 4 10/06/2019    MG 1 9 02/11/2022     Anthropometric Measurements:   Height: 59"  Ht Readings from Last 1 Encounters:   02/17/22 4' 10 5" (1 486 m)     Wt Readings from Last 20 Encounters:   02/17/22 58 5 kg (129 lb)   02/14/22 57 2 kg (126 lb)   02/11/22 58 5 kg (129 lb)   01/25/22 61 5 kg (135 lb 9 6 oz)   01/24/22 60 3 kg (133 lb)   01/21/22 61 2 kg (134 lb 14 7 oz)   01/21/22 61 2 kg (135 lb)   01/11/22 60 3 kg (133 lb)   01/03/22 61 kg (134 lb 8 oz)   12/30/21 61 2 kg (135 lb)   12/13/21 63 7 kg (140 lb 8 oz)   12/10/21 64 9 kg (143 lb)   12/09/21 65 3 kg (144 lb)   11/22/21 65 4 kg (144 lb 2 9 oz)   11/19/21 64 3 kg (141 lb 12 1 oz)   11/19/21 65 1 kg (143 lb 8 oz)   11/16/21 66 7 kg (147 lb)   11/01/21 66 7 kg (147 lb)   10/29/21 67 8 kg (149 lb 8 oz)   10/22/21 67 6 kg (149 lb 0 5 oz)     Weight Hx:  · Usual Weight: 270# before RNYGB in 2001; lowest post-op wt: 200#; wt typically fluctuates between 215-230# (prior to wt loss)  · Varian: (2/25/20) 185 6#, (3/3/20) 188 6#, (3/10/20) 187 2#, (3/19/20) 186 2#, (3/24/20) 187#, (3/31/20) 185 4#, (4/10/20) 184  4#   · Home Scale Wts: (2/19/20) 185#, (8/3/20) 194#    Oncology Nutrition-Anthropometrics      [facility] Nutrition from 1/25/2022 in [facility]   Patient age (years): 61 years 61 years   Patient (female) height (in): 61 in 61 in Current Weight to be used for anthropometric calculations (lbs) 129 lbs 135 lbs   Current Weight to be used for anthropometric calculations (kg) 58 6 kg 61 4 kg   BMI: 26 1 27 3   IBW female: 95 lbs 95 lbs   IBW (kg) female: 43 2 kg 43 2 kg   IBW % (female) 135 8 % 142 1 %   Adjusted BW (female): 103 5 lbs 105 lbs   Adjusted BW kg (female): 47 kg 47 7 kg   % weight change after 1 week: 0 % --   Weight change after 1 week (lbs) 0 lbs --   % weight change after 1 month: -4 4 % 0 %   Weight change after 1 month (lbs) -6 lbs 0 lbs   % weight change after 3 months: -12 2 % -9 5 %   Weight change after 3 months (lbs) -18 lbs -14 1 lbs         Nutrition-Focused Physical Findings: N/A d/t telephone call  Last in-person visit (10/11/21) observed:  mild  muscle depletion ([place]) - hollowing/scooping/depression and mild  body fat depletion ([place]) - hollowing/depression/dark circles    Food/Nutrition-Related History & Client/Social History:    Current Nutrition Impact Symptoms:   [] Nausea  [x] Reduced Appetite -  -on marinol which seems to help  -gets hungry but says eating "is not worth the effort" d/t oral pain [] Acid Reflux   [] Vomiting  [x] Unintended Wt Loss - ongoing & significant  -pt has been discussing psychological component of wt loss with her therapist  -cancer cachexia per GynOnc - referred to [facility] and bariatric dietitian   [x] Malabsorption - S/p RNYGB in 2001   [] Diarrhea  [] Unintended Wt Gain  [] Dumping Syndrome   [x] Constipation - ongoing, no BM for a couple of days, feels like she might have one today  -has not used apple prune sauce lately (mixture has been successful for her in the past)  -On bowel protocol [] Thick Mucous/Secretions  [] Abdominal Pain    [] Dysgeusia (Altered Taste) [x] Xerostomia (Dry Mouth) - significant, worse at night  -Keeps water nearby  -using Biotene toothpaste and mouthwash    Biotene has been effective  -uses dry mouth lozenges prn  -to restart bs/sw rinses [] Gas [] Dysosmia (Altered Smell)  [] Lexi Multani  [] Difficulty Chewing    [x] Oral Mucositis (Sore Mouth) - Mouth sores on the side of mouth  -plans to re-start bs/sw rinses as these have been helpful in the past [x] Fatigue - significant around [place], improved since depression medication was increased [x] Pain: pubic area, limits po intake   [] Odynophagia   [] Esophagitis  [] Other:    [] Dysphagia [] Early Satiety  [] No Problems Eating      Food Allergies & Intolerances: yes: had skin patch testing which showed allergy to strawberries, but says she is able to eat strawberries without any side effects    Current Diet: Regular Diet, No Restrictions  Current Nutrition Intake: Decreased since last visit   Appetite: Poor-Fair  Nutrition Route: PO   Oral Care: Biotene, plans to start bs/sw rinses again for mouth sores  Activity level: Sedentary  Limited by pain and medical condition  24 Hr Diet Recall: keeping a list of po intake on phone of what she eats, say she has been eating but not a lot  Breakfast: [employer] Flip yogurt (ate half); yesterday: eggs benedict (ate 1 egg)  Snack: fresh fries (1 handful)  Lunch: 1/2 cup broccoli cheddar soup from [employer]  Snack: medium chocolate Frostie with some Unjury mixed in (ate half)  Dinner: shrimp cocktail (8 shrimp)  Snack: humus with naan bread (2 pc)    Beverages: water (16 9 oz x2-3), Unjury mixed with another beverage (lemonade (does not hurt mouth d/t using a straw), soup, or Frostie), DD unsweetened iced tea (32 oz x1)   -Does not drink coffee or alcohol      -Does not separate fluids from solids, does not have discomfort while eating and drinking at the same time    Supplements:    Unjury Protein Powder mixed with lemonade (32 oz + 1/2 pkt) OR with soup - QD    Does not like Ensure or Boost; has tried homemade milkshakes and CIB but lacks the motivation to make them    Oncology Nutrition-Estimated Needs      Nutrition from 10/11/2021 in [facility]   Weight type used Adjusted weight Adjusted weight   Weight in kilograms (kg) used for estimated needs 49 5 kg 50 7 kg   Energy needs formula:  35-40 kcal/kg 35-40 kcal/kg   Energy needs based on 35 kcal/k kcal 1775 kcal   Energy needs based on 40 kcal/k kcal 2029 kcal   Protein needs formula: 1 5-2 g/kg 1 5-2 g/kg   Protein needs based on 1 5 g/kg 74 g 76 g   Protein needs based on 2 g/kg 99 g 101 g   Fluid needs formula: 30-35 mL/kg 30-35 mL/kg   Fluid needs based on 30 mL/kg 1485 mL 1527 mL   Fluid needs in ounces 50 oz 52 oz   Fluid needs based on 35 mL/kg 1733 mL 1782 mL   Fluid needs in ounces 59 oz 60 oz        Discussion & Intervention:    Ragini was evaluated today for an RD follow up regarding wt loss and pt request for dietitian involvement throughout tx  Ragini is currently undergoing tx for endometrial cancer  Ragini continues to experience significant, undesirable weight loss  Her weight has been stable for the past week  She says that she has been eating frequently, but not a lot overall  She is using 1/2 pkt of Unjury daily and wants to increase this to 1 pkt daily  She feels as though she is doing a good job with hydration  She continues to experience mouth sores on the sides of her mouth and xerostomia  She is using Biotene but this is not helpful for mouth sores, only helpful for xerostomia  We talked about re-starting bs/sw rinses for mouth sores as these have been successful in the past, and a soft/moist/bland diet  Constipation continues to be an issue, she will think about re-starting the apple/prune sauce as it was successful in the past for constipation  She has both of these recipes at home  Ragini has been following with 2 RD's  She said her clinical trials nurse recommended she see a bariatric dietitian    Moving forward, Ragini reports that she wishes to follow with 1 RD and will continue following with oncology RD d/t convenience of appts (can be seen during infusion or by phone)  She does have a follow up with the bariatric RD tomorrow  Ragini has recognized that she does best with self-directed goals and not being told what to do  All goals and plan of care were selected by pt today  Supportive listening and encouragement were provided  Reviewed 24 hour recall, which revealed an inadequate po intake, and discussed ways to increase kcal, protein, and fluid intakes and optimize nutrient intake  Also reviewed the importance of wt management throughout the tx process and the role of a high kcal/ high protein diet in managing wt and overall health  Based on today's assessment, discussion included: MNT for: wt loss, lack of appetite, sore mouth, xerostomia, practicing proper oral care, a high kcal/protein diet & food choices to include at all meals & snacks (Examples of high kcal foods: cheese, full-fat dairy products, nut butter, plant-based fats, coconut oil/milk, avocado, butter, cream soup, etc  Examples of high protein foods: eggs, chicken, fish, beans/legumes, nuts/nut butters, bone broth, etc ) , adequate hydration & fluid choices, sipping on calorie containing beverages (examples include: adding whole milk or cream to coffee, oral nutrition supplements, juice, electrolyte replacement beverages, milk, etc ), eating smaller more frequent meals every 2-3 hours (5-6 small meals/day), having consistent and planned snacks between meals, eating when feeling most hungry, increasing oral nutrition supplements and recipe suggestions/resources, trying new recipes, & cooking at home more often     Moving forward, Ragini was encouraged to increase kcal, protein, and fluid intakes, and increase oral nutrition supplements  Materials Provided: not applicable  All questions and concerns addressed during todays visit    Ragini has RD contact information  Nutrition Diagnosis:  Inadequate Energy Intake related to physiological causes, disease state and treatment related issues as evidenced by food recall, wt loss and discussion with pt and/or family  Increased Nutrient Needs (kcal & pro) related to increased demand for nutrients and disease state as evidenced by cancer dx and pt undergoing tx for cancer  Altered GI Function related to alteration in GI tract motility and integrity as evidenced by Constipation, Oral Mucositis (Sore Mouth) and Xerostomia (Dry Mouth)  Patient has clinical indicators (or ASPEN criteria) consistent with severe protein-calorie malnutrition in the context of Chronic Illness as evidenced by >7 5% wt loss in 3 months, </=75% energy intake vs  Estimated needs for >/=1 month, mild  muscle depletion ([place]) - hollowing/scooping/depression and mild  body fat depletion ([place]) - hollowing/depression/dark circles  Monitoring & Evaluation:   Goals:  · weight maintenance/stabilization  · adequate nutrition impact symptom management  · pt to meet >/=75% estimated nutrition needs daily     · Progress Towards Goals: Progressing    Nutrition Rx & Recommendations:  · Diet: High Calorie, High Protein (for high calorie foods see pages 52-53, and for high protein foods see pages 49-51 in your Eating Hints book)  · Small, frequent meals/snacks may be easier to tolerate than 3 large daily meals  Aim for 5-6 small meals per day (every 2-3 hours)  · Include protein at all meals/snacks  · Avoid spicy, acidic, sharp/hard/crunchy foods & carbonated beverages as needed  · Follow proper oral care; Try baking soda/salt water rinse recipe (mix 3/4 tsp salt + 1 tsp baking soda + 1 qt water; rinse with plain water after using) in Eating Hints book (pg 18)  Brush your teeth before/after meals & before bed    · For appetite loss: try powdered or liquid nutrition supplements; eating by the clock every 2-3 hours; set a timer to remind yourself to eat, keep snacks nearby; add extra protein & calories to your diet; drink liquids in between meals, choose liquids that add calories; eat a bedtime snack; eat soft, cool or frozen foods; eat a larger meal when you feel well & rested; only sip small amounts of liquids during meals (see pages 10-12 in your Eating Hints book)  · For weight loss: monitor your weight at home at least 2x/week, record your weight, start by adding 250-500 extra calories per day, eat 5-6 small scheduled meals every 2-3 hrs, choose foods that are high in protein and calories (see pages 49-53 in your Eating Hints book), drink liquids with calories for example: milkshakes/smoothies/juice/soup/whole milk/chocolate milk, cook with protein fortified milk (see recipe on page 36 in your Eating Hints book), consider ready-to-drink oral nutrition supplements such as Ensure Plus, Ensure Enlive, Boost Plus, or Boost Very High Calorie, avoid "diet" and "light" foods when possible, avoid drinking too much with meals, contact your dietitian with any continued weight loss over the course of 1 week  For more info see pages 35-37 in your Eating Hints book  · For constipation: drink plenty of fluids (>64 oz/day); drink hot liquids; eat high-fiber foods as tolerated (whole grains, beans, peas, nuts, seeds, fruits, vegetables, etc); increase physical activity as tolerated  Avoid increasing fiber intake too quickly, add fiber into your diet slowly; keep a record of your bowel movements (see pages 13-14 in you Eating Hints book)  · For sore mouth/throat: choose foods that are easy to chew/swallow; cook foods until they are soft/tender; moisten & soften foods (gravy/sauce/broth/yogurt); cut food into small pieces; drink with a straw (as tolerated); eat with a very small spoon; eat cold or room temperature food; suck on ice chips;  Avoid citrus, spicy foods, tomatoes/ketchup, salty foods, raw vegetables, sharp/crunchy/hard foods & alcohol (see pages 23-28 in your Eating Hints book)  · For dry mouth: sip water throughout the day; try very sweet (or tart, as tolerated) drinks; chew gum or suck on hard candy, popsicles, & ice chips; eat easy to swallow foods (such as pureed cooked foods or soups); moisten food with sauce/gravy/salad dressing; do not drink beer, wine, or any type of alcohol; keep lips moist with lip balm; avoid tobacco products & second-hand smoke; try Biotene as needed (see pages 17-18 in your Eating Hints book)  Follow proper oral care; Try baking soda/salt water rinse recipe (mix 3/4 tsp salt + 1 tsp baking soda + 1 qt water; rinse with pain water after using) in Eating Hints book (pg 18)  Brush your teeth before/after meals & before bed  · Weigh yourself regularly  If you notice weight loss, make an effort to increase your daily food/calorie intake  If you continue to notice loss after these efforts, reach out to your dietitian to establish a plan to stabilize weight  · Snack ideas: [place] yogurt with fruit, pudding, veggies with humus OR try greek yogurt based vegetable dip, peanut butter, grapes and cheese, fruit with a cheese stick , cottage cheese, soup, unjury, protein shakes/smoothies      Patient choosen goals:  · Continue to write down food intake each day  · Nutrition Supplements:  Aim for 1 Unjury protein shake daily  · Re-start baking soda salt water rinses for mouth sores  · Re-start apple prune sauce for constipation      Follow Up Plan: 3/7/22 during infusion   Recommend Referral to Other Providers: none at this time

## 2022-02-17 ENCOUNTER — OFFICE VISIT (OUTPATIENT)
Dept: FAMILY MEDICINE CLINIC | Facility: CLINIC | Age: 67
End: 2022-02-17
Payer: COMMERCIAL

## 2022-02-17 VITALS
HEART RATE: 68 BPM | RESPIRATION RATE: 16 BRPM | WEIGHT: 129 LBS | TEMPERATURE: 97.6 F | SYSTOLIC BLOOD PRESSURE: 100 MMHG | HEIGHT: 59 IN | BODY MASS INDEX: 26 KG/M2 | OXYGEN SATURATION: 99 % | DIASTOLIC BLOOD PRESSURE: 64 MMHG

## 2022-02-17 DIAGNOSIS — Z00.00 WELCOME TO MEDICARE PREVENTIVE VISIT: Primary | ICD-10-CM

## 2022-02-17 DIAGNOSIS — I10 BENIGN ESSENTIAL HYPERTENSION: ICD-10-CM

## 2022-02-17 DIAGNOSIS — I82.4Y1 ACUTE DEEP VEIN THROMBOSIS (DVT) OF PROXIMAL VEIN OF RIGHT LOWER EXTREMITY (HCC): ICD-10-CM

## 2022-02-17 PROCEDURE — G0402 INITIAL PREVENTIVE EXAM: HCPCS | Performed by: INTERNAL MEDICINE

## 2022-02-17 PROCEDURE — 1090F PRES/ABSN URINE INCON ASSESS: CPT | Performed by: INTERNAL MEDICINE

## 2022-02-17 PROCEDURE — 3288F FALL RISK ASSESSMENT DOCD: CPT | Performed by: INTERNAL MEDICINE

## 2022-02-17 PROCEDURE — 3078F DIAST BP <80 MM HG: CPT | Performed by: INTERNAL MEDICINE

## 2022-02-17 PROCEDURE — 99214 OFFICE O/P EST MOD 30 MIN: CPT | Performed by: INTERNAL MEDICINE

## 2022-02-17 PROCEDURE — 3074F SYST BP LT 130 MM HG: CPT | Performed by: INTERNAL MEDICINE

## 2022-02-17 PROCEDURE — 3725F SCREEN DEPRESSION PERFORMED: CPT | Performed by: INTERNAL MEDICINE

## 2022-02-17 NOTE — PROGRESS NOTES
Assessment and Plan:     Problem List Items Addressed This Visit     None           Preventive health issues were discussed with patient, and age appropriate screening tests were ordered as noted in patient's After Visit Summary  Personalized health advice and appropriate referrals for health education or preventive services given if needed, as noted in patient's After Visit Summary  History of Present Illness:     Patient presents for Welcome to Medicare visit  Patient Care Team:  Brigid Cleaning DO as PCP - General (Internal Medicine)  BRITANY Jaffe MD (Obstetrics and Gynecology)  RAFITA Flores (Vascular Surgery)  Sadi Luo MD (Radiation Oncology)  Cash Tineo RD (Nutrition)  Generic Provider MD Latonia (Inactive) as Referring Physician ([address])  Felix Samayoa MD (Gynecologic Oncology)  Serenity Barreto (Nutrition)     Review of Systems:     Review of Systems   Constitutional: Negative for chills and fever  HENT: Negative  Respiratory: Negative  Negative for cough and shortness of breath  Cardiovascular: Negative         Problem List:     Patient Active Problem List   Diagnosis    Benign essential hypertension    Major depression, chronic    Dyslipidemia    Endometrial [facility] ([facility] [address] )    Acute deep vein thrombosis (DVT) of proximal vein of lower extremity (HCC)    Breast cancer screening    Encounter for central line care    Cancer related pain    Chemotherapy induced neutropenia ([address] )    Anemia    Chemotherapy-induced nausea    Hypokalemia    Hyponatremia    Generalized weakness    Bilateral lower extremity edema    Moderate protein-calorie malnutrition (HCC)    Ambulatory dysfunction    Hypomagnesemia    Obstruction of right ureter    Overactive bladder    Acute left-sided low back pain without sciatica    Nephrostomy status (HCC)    Bilateral piriformis syndrome    Chronic pain syndrome    Myofascial pain syndrome    Pathological fracture due to osteoporosis    Osteoporosis    Drug induced constipation    Dental disorder    Need for follow-up by     Exertional dyspnea    Pubic bone pain    Closed nondisplaced fracture of right pubis with delayed healing    Abnormal urine findings    Secondary malignant neoplasm of other specified sites [facility])    Chronic UTI    Cachexia [facility])      Past Medical and Surgical History:     Past Medical History:   Diagnosis Date    Anemia     Chemotherapy follow-up examination     Depression     Endometrial cancer ([address] ) 12/2017    Hyperglycemia     vx type 2 dm -- last assessed 4/1/14; resolved 11/7/17    Hyperlipidemia     Hypertension     Obesity     last assessed 10/14/17; resolved 11/7/17     Past Surgical History:   Procedure Laterality Date    ABDOMINAL SURGERY      GASTRIC BYPASS    CHOLECYSTECTOMY      at the time of gastric bypass    COLONOSCOPY      CT NEEDLE BIOPSY LYMPH NODE  7/8/2019    FL GUIDED CENTRAL VENOUS ACCESS DEVICE INSERTION  11/12/2019    GASTRIC BYPASS      HYSTERECTOMY Bilateral     total abdominal with salpingo-oophorectomy    IR NEPHROSTOMY TUBE PLACEMENT  7/26/2019    IR NEPHROURETERAL STENT CHECK/CHANGE/REPOSITION  4/6/2021    IR NEPHROURETERAL STENT CHECK/CHANGE/REPOSITION  6/4/2021    IR NEPHROURETERAL STENT CHECK/CHANGE/REPOSITION  9/3/2021    IR NEPHROURETERAL STENT CHECK/CHANGE/REPOSITION  12/7/2021    IR PICC LINE  9/27/2019    IR PORT PLACEMENT  7/26/2019    IR PORT REMOVAL  9/20/2019    OOPHORECTOMY Bilateral     MO INSJ TUNNELED CTR VAD W/SUBQ PORT AGE 5 YR/> Left 11/12/2019    Procedure: INSERTION VENOUS PORT ( PORT-A-CATH) IR;  Surgeon: Maddie Amaya DO;  [facility];  Service: Interventional Radiology    MO LAP, RADICAL HYST W/ TUBE&OV, NODE BX N/A 12/19/2017    Procedure: HYSTERECTOMY LAPAROSCOPIC TOTAL ([address]) W/ ROBOTICS; BILATERAL SALPINGOOPHERECTOMY; LYMPH NODE DISSECTION; lysis of adhesions;  Surgeon: Aide Vaughn MD;  [facility];  Service: Gynecology Oncology    MD LAP,DIAGNOSTIC ABDOMEN N/A 12/19/2017    Procedure: LAPAROSCOPY DIAGNOSTIC;  Surgeon: Aide Vaughn MD;  [facility];  Service: Gynecology Oncology    TONSILLECTOMY      US GUIDED BREAST BIOPSY RIGHT COMPLETE Right 6/28/2019      Family History:     Family History   Problem Relation Age of Onset    Other Mother         dyslipidemia    Ovarian cancer Mother 48    Lymphoma Father     Bone cancer Maternal Grandfather     No Known Problems Sister     No Known Problems Maternal Grandmother     Uterine cancer Paternal Grandmother     No Known Problems Paternal Grandfather     No Known Problems Maternal Aunt     No Known Problems Maternal Aunt     No Known Problems Maternal Aunt     No Known Problems Maternal Aunt     No Known Problems Paternal Aunt     No Known Problems Paternal Aunt     No Known Problems Paternal Aunt     No Known Problems Paternal Aunt     No Known Problems Brother     No Known Problems Brother       Social History:     Social History     Socioeconomic History    Marital status: Single     Spouse name: None    Number of children: None    Years of education: None    Highest education level: None   Occupational History    None   Tobacco Use    Smoking status: Never Smoker    Smokeless tobacco: Never Used   Vaping Use    Vaping Use: Never used   Substance and Sexual Activity    Alcohol use: Not Currently    Drug use: No    Sexual activity: Not Currently   Other Topics Concern    None   Social History Narrative    None     Social Determinants of Health     Financial Resource Strain: Not on file   Food Insecurity: Not on file   Transportation Needs: Not on file   Physical Activity: Not on file   Stress: Not on file   Social Connections: Not on file   Intimate Partner Violence: Not on file   Housing Stability: Not on file      Medications and Allergies:     Current Outpatient Medications   Medication Sig Dispense Refill    acetaminophen-codeine (TYLENOL #3) 300-30 mg per tablet Take 1 tablet by mouth every 6 (six) hours as needed for moderate pain 20 tablet 0    ARIPiprazole (ABILIFY) 10 mg tablet Take 20 mg by mouth daily        aspirin (ECOTRIN LOW STRENGTH) 81 mg EC tablet Take 81 mg by mouth daily      Calcium-Magnesium-Vitamin D (CALCIUM 500 PO) Take by mouth daily       cholecalciferol (VITAMIN D3) 1,000 units tablet Take 1,000 Units by mouth daily      Cyanocobalamin (VITAMIN B12 PO) Take by mouth daily       dronabinol (MARINOL) 2 5 mg capsule Take 1 capsule (2 5 mg total) by mouth 2 (two) times a day before meals 60 capsule 0    enoxaparin (LOVENOX) 100 mg/mL INJECT 1 ML (100 MG TOTAL) UNDER THE SKIN EVERY 24 HOURS 30 mL 5    folic acid (FOLVITE) 1 mg tablet Take 1 tablet (1 mg total) by mouth daily  0    gemfibrozil (LOPID) 600 mg tablet TAKE 1 TABLET BY MOUTH EVERY DAY 90 tablet 1    lactulose (CHRONULAC) 10 g/15 mL solution TAKE 45 ML (30 G TOTAL) BY MOUTH 2 (TWO) TIMES A DAY (Patient taking differently: as needed TAKE 45 ML (30 G TOTAL) BY MOUTH 2 (TWO) TIMES A DAY) 300 mL 2    LORazepam (ATIVAN) 0 5 mg tablet Take 1 tablet (0 5 mg total) by mouth every 6 (six) hours as needed for anxiety (or nausea) 36 tablet 1    Multiple Vitamin (MULTIVITAMIN) tablet Take 1 tablet by mouth daily      naloxone (NARCAN) 4 mg/0 1 mL nasal spray Administer 1 spray into a nostril  If no response after 2-3 minutes, give another dose in the other nostril using a new spray   1 each 0    omeprazole (PriLOSEC) 20 mg delayed release capsule Take 20 mg by mouth daily      ondansetron (ZOFRAN) 8 mg tablet Take 1 tablet (8 mg total) by mouth every 8 (eight) hours as needed for nausea or vomiting 30 tablet 1    oxybutynin (DITROPAN XL) 15 MG 24 hr tablet TAKE 1 TABLET BY MOUTH DAILY AT BEDTIME 90 tablet 3    phenazopyridine (PYRIDIUM) 200 mg tablet Take 1 tablet (200 mg total) by mouth daily as needed for bladder spasms 30 tablet 0    quinapril (ACCUPRIL) 5 mg tablet TAKE 1 TABLET BY MOUTH EVERYDAY AT BEDTIME 90 tablet 0    rucaparib (RUBRACA) 300 mg tablet Take 600 mg by mouth every 12 (twelve) hours Take with or without food  Do not repeat a vomited dose   saccharomyces boulardii (FLORASTOR) 250 mg capsule Take 1 capsule (250 mg total) by mouth 2 (two) times a day  0    venlafaxine (EFFEXOR) 75 mg tablet Take 75 mg by mouth 3 (three) times a day        ciprofloxacin (CIPRO) 500 mg tablet TAKE 1 TABLET BY MOUTH 1 HOUR PRIOR TO TUBE CHANGE (Patient not taking: Reported on 1/21/2022)      [employer]S Melatonin 3 MG TAKE 1 TABLET (3 MG TOTAL) BY MOUTH DAILY AT BEDTIME (Patient not taking: Reported on 2/17/2022) 90 tablet 1    Sodium Chloride Flush (Normal Saline Flush) 0 9 % SOLN  (Patient not taking: Reported on 11/16/2021 )       No current facility-administered medications for this visit       Allergies   Allergen Reactions    Cephalosporins Rash     Which Cephalosporin reaction was to not specified; however, has tolerated Amoxicillin, Cefazolin, and Cefepime      Immunizations:     Immunization History   Administered Date(s) Administered    COVID-19 PFIZER VACCINE 0 3 ML IM 03/10/2021, 04/07/2021, 09/18/2021    Influenza, injectable, quadrivalent, preservative free 0 5 mL 11/08/2019    Influenza, recombinant, quadrivalent,injectable, preservative free 10/14/2020, 12/07/2021    Tuberculin Skin Test-PPD Intradermal 09/29/2015      Health Maintenance:         Topic Date Due    Hepatitis C Screening  Never done    DXA SCAN  Never done    HIV Screening  Never done    Breast Cancer Screening: Mammogram  06/24/2020    Colorectal Cancer Screening  10/05/2020         Topic Date Due    Pneumococcal Vaccine: Pediatrics (0 to 5 Years) and At-Risk Patients (6 to 59 Years) (1 of 4 - PCV13) Never done    DTaP,Tdap,and Td Vaccines (1 - Tdap) Never done   [facility] COVID-19 Vaccine (4 - Booster for Pfizer series) 02/18/2022      Medicare Screening Tests and Risk Assessments:     Brandy Phoenix is here for her Welcome to Medicare visit  Health Risk Assessment:   Patient rates overall health as fair  Patient feels that their physical health rating is slightly worse  Patient is satisfied with their life  Eyesight was rated as same  Hearing was rated as same  Patient feels that their emotional and mental health rating is slightly worse  Patients states they are sometimes angry  Patient states they are always unusually tired/fatigued  Pain experienced in the last 7 days has been none  Patient states that she has experienced weight loss or gain in last 6 months  Depression Screening:   PHQ-9 Score: 15      Fall Risk Screening: In the past year, patient has experienced: no history of falling in past year      Urinary Incontinence Screening:   Patient has leaked urine accidently in the last six months  Home Safety:  Patient has trouble with stairs inside or outside of their home  Patient has working smoke alarms and has no working carbon monoxide detector  Nutrition:   Current diet is Regular  Medications:   Patient is currently taking over-the-counter supplements  OTC medications include: see medication list  Patient is able to manage medications  Activities of Daily Living (ADLs)/Instrumental Activities of Daily Living (IADLs):   Walk and transfer into and out of bed and chair?: Yes  Dress and groom yourself?: Yes    Bathe or shower yourself?: Yes    Feed yourself? Yes  Do your laundry/housekeeping?: Yes  Manage your money, pay your bills and track your expenses?: Yes  Make your own meals?: Yes    Do your own shopping?: No    Previous Hospitalizations:   Any hospitalizations or ED visits within the last 12 months?: No      Advance Care Planning:   Living will: No    Durable POA for healthcare:  Yes    Advanced directive: No      Comments: Sister magdy is durable poa/ and brother Lurdes Fry is durable poa    Cognitive Screening:   Provider or family/friend/caregiver concerned regarding cognition?: No    PREVENTIVE SCREENINGS      Cardiovascular Screening:    General: Screening Current      Diabetes Screening:     General: Screening Current      Osteoporosis Screening:    General: Screening Not Indicated and History Osteoporosis      Lung Cancer Screening:     General: Screening Not Indicated    Screening, Brief Intervention, and Referral to Treatment (SBIRT)    Screening    Typical number of drinks in a week: 0    Single Item Drug Screening:  How often have you used an illegal drug (including marijuana) or a prescription medication for non-medical reasons in the past year? never    Single Item Drug Screen Score: 0  Interpretation: Negative screen for possible drug use disorder    No exam data present     Physical Exam:     /64 (BP Location: Left arm, Patient Position: Sitting, Cuff Size: Standard)   Pulse 68   Temp 97 6 °F (36 4 °C) (Temporal)   Resp 16   Ht 4' 10 5" (1 486 m)   Wt 58 5 kg (129 lb)   LMP  (LMP Unknown)   SpO2 99%   BMI 26 50 kg/m²     Physical Exam     Tan Stephenson DO

## 2022-02-17 NOTE — PROGRESS NOTES
Assessment/Plan:         Diagnoses and all orders for this visit:    Welcome to Medicare preventive visit    Benign essential hypertension  Comments:  hold quinipril  Acute deep vein thrombosis (DVT) of proximal vein of right lower extremity (HCC)  Comments:  lovenox - try kris ointment for her lovenox injections          Subjective:      Patient ID: Miroslava Torres is a 61 y o  female  Pt gets chemo q 3 weeks and po med bid  States no new cancer and cancer that she has has decreased  The following portions of the patient's history were reviewed and updated as appropriate: She  has a past medical history of Anemia, Chemotherapy follow-up examination, Depression, Endometrial cancer ([facility] [address] ) (12/2017), Hyperglycemia, Hyperlipidemia, Hypertension, and Obesity    She   Patient Active Problem List    Diagnosis Date Noted    Cachexia ([facility] [address] ) 10/06/2021    Chronic UTI 08/06/2021    Secondary malignant neoplasm of other specified sites [facility]) 07/07/2021    Abnormal urine findings 06/25/2021    Pubic bone pain 05/14/2021    Closed nondisplaced fracture of right pubis with delayed healing 05/14/2021    Exertional dyspnea 04/02/2021    Need for follow-up by  03/12/2021    Dental disorder 01/29/2021    Drug induced constipation 01/08/2021    Osteoporosis 11/30/2020    Pathological fracture due to osteoporosis 11/25/2020    Bilateral piriformis syndrome     Chronic pain syndrome     Myofascial pain syndrome     Nephrostomy status ([facility] [address] ) 10/14/2020    Overactive bladder 08/06/2020    Acute left-sided low back pain without sciatica 08/06/2020    Obstruction of right ureter 01/03/2020    Hypomagnesemia 10/18/2019    Ambulatory dysfunction 10/11/2019    Moderate protein-calorie malnutrition ([facility] [address] ) 10/03/2019    Bilateral lower extremity edema 09/30/2019    Generalized weakness 09/29/2019    Hypokalemia 09/19/2019    Hyponatremia 09/19/2019    Chemotherapy-induced nausea 09/18/2019    Anemia 09/06/2019    Chemotherapy induced neutropenia ([facility] [address] ) 08/30/2019    Cancer related pain 07/19/2019    Encounter for central line care 07/16/2019    Acute deep vein thrombosis (DVT) of proximal vein of lower extremity ([facility] [address] ) 06/19/2019    Breast cancer screening 06/19/2019    Benign essential hypertension 12/19/2017    Endometrial cancer ([facility] [address] ) 11/29/2017    Dyslipidemia 04/01/2014    Major depression, chronic 10/07/2013     She  has a past surgical history that includes Abdominal surgery; Colonoscopy; pr lap, radical hyst w/ tube&ov, node bx (N/A, 12/19/2017); pr lap,diagnostic abdomen (N/A, 12/19/2017); Cholecystectomy; Gastric bypass; Tonsillectomy; Hysterectomy (Bilateral); Oophorectomy (Bilateral); US guided breast biopsy right complete (Right, 6/28/2019); CT needle biopsy lymph node (7/8/2019); IR port placement (7/26/2019); IR nephrostomy tube placement (7/26/2019); IR port removal (9/20/2019); IR PICC line (9/27/2019); pr insj tunneled ctr vad w/subq port age 13 yr/> (Left, 11/12/2019); FL guided central venous access device insertion (11/12/2019); IR nephroureteral stent check/change/reposition (4/6/2021); IR nephroureteral stent check/change/reposition (6/4/2021); IR nephroureteral stent check/change/reposition (9/3/2021); and IR nephroureteral stent check/change/reposition (12/7/2021)  Her family history includes Bone cancer in her maternal grandfather; Lymphoma in her father; No Known Problems in her brother, brother, maternal aunt, maternal aunt, maternal aunt, maternal aunt, maternal grandmother, paternal aunt, paternal aunt, paternal aunt, paternal aunt, paternal grandfather, and sister; Other in her mother; Ovarian cancer (age of onset: 48) in her mother; Uterine cancer in her paternal grandmother  She  reports that she has never smoked  She has never used smokeless tobacco  She reports previous alcohol use  She reports that she does not use drugs    Current Outpatient Medications Medication Sig Dispense Refill    acetaminophen-codeine (TYLENOL #3) 300-30 mg per tablet Take 1 tablet by mouth every 6 (six) hours as needed for moderate pain 20 tablet 0    ARIPiprazole (ABILIFY) 10 mg tablet Take 20 mg by mouth daily        aspirin (ECOTRIN LOW STRENGTH) 81 mg EC tablet Take 81 mg by mouth daily      Calcium-Magnesium-Vitamin D (CALCIUM 500 PO) Take by mouth daily       cholecalciferol (VITAMIN D3) 1,000 units tablet Take 1,000 Units by mouth daily      Cyanocobalamin (VITAMIN B12 PO) Take by mouth daily       dronabinol (MARINOL) 2 5 mg capsule Take 1 capsule (2 5 mg total) by mouth 2 (two) times a day before meals 60 capsule 0    enoxaparin (LOVENOX) 100 mg/mL INJECT 1 ML (100 MG TOTAL) UNDER THE SKIN EVERY 24 HOURS 30 mL 5    folic acid (FOLVITE) 1 mg tablet Take 1 tablet (1 mg total) by mouth daily  0    gemfibrozil (LOPID) 600 mg tablet TAKE 1 TABLET BY MOUTH EVERY DAY 90 tablet 1    lactulose (CHRONULAC) 10 g/15 mL solution TAKE 45 ML (30 G TOTAL) BY MOUTH 2 (TWO) TIMES A DAY (Patient taking differently: as needed TAKE 45 ML (30 G TOTAL) BY MOUTH 2 (TWO) TIMES A DAY) 300 mL 2    LORazepam (ATIVAN) 0 5 mg tablet Take 1 tablet (0 5 mg total) by mouth every 6 (six) hours as needed for anxiety (or nausea) 36 tablet 1    Multiple Vitamin (MULTIVITAMIN) tablet Take 1 tablet by mouth daily      naloxone (NARCAN) 4 mg/0 1 mL nasal spray Administer 1 spray into a nostril  If no response after 2-3 minutes, give another dose in the other nostril using a new spray   1 each 0    omeprazole (PriLOSEC) 20 mg delayed release capsule Take 20 mg by mouth daily      ondansetron (ZOFRAN) 8 mg tablet Take 1 tablet (8 mg total) by mouth every 8 (eight) hours as needed for nausea or vomiting 30 tablet 1    oxybutynin (DITROPAN XL) 15 MG 24 hr tablet TAKE 1 TABLET BY MOUTH DAILY AT BEDTIME 90 tablet 3    phenazopyridine (PYRIDIUM) 200 mg tablet Take 1 tablet (200 mg total) by mouth daily as needed for bladder spasms 30 tablet 0    quinapril (ACCUPRIL) 5 mg tablet TAKE 1 TABLET BY MOUTH EVERYDAY AT BEDTIME 90 tablet 0    rucaparib (RUBRACA) 300 mg tablet Take 600 mg by mouth every 12 (twelve) hours Take with or without food  Do not repeat a vomited dose   saccharomyces boulardii (FLORASTOR) 250 mg capsule Take 1 capsule (250 mg total) by mouth 2 (two) times a day  0    venlafaxine (EFFEXOR) 75 mg tablet Take 75 mg by mouth 3 (three) times a day        ciprofloxacin (CIPRO) 500 mg tablet TAKE 1 TABLET BY MOUTH 1 HOUR PRIOR TO TUBE CHANGE (Patient not taking: Reported on 1/21/2022)      [employer]S Melatonin 3 MG TAKE 1 TABLET (3 MG TOTAL) BY MOUTH DAILY AT BEDTIME (Patient not taking: Reported on 2/17/2022) 90 tablet 1    Sodium Chloride Flush (Normal Saline Flush) 0 9 % SOLN  (Patient not taking: Reported on 11/16/2021 )       No current facility-administered medications for this visit       Current Outpatient Medications on File Prior to Visit   Medication Sig    acetaminophen-codeine (TYLENOL #3) 300-30 mg per tablet Take 1 tablet by mouth every 6 (six) hours as needed for moderate pain    ARIPiprazole (ABILIFY) 10 mg tablet Take 20 mg by mouth daily      aspirin (ECOTRIN LOW STRENGTH) 81 mg EC tablet Take 81 mg by mouth daily    Calcium-Magnesium-Vitamin D (CALCIUM 500 PO) Take by mouth daily     cholecalciferol (VITAMIN D3) 1,000 units tablet Take 1,000 Units by mouth daily    Cyanocobalamin (VITAMIN B12 PO) Take by mouth daily     dronabinol (MARINOL) 2 5 mg capsule Take 1 capsule (2 5 mg total) by mouth 2 (two) times a day before meals    enoxaparin (LOVENOX) 100 mg/mL INJECT 1 ML (100 MG TOTAL) UNDER THE SKIN EVERY 24 HOURS    folic acid (FOLVITE) 1 mg tablet Take 1 tablet (1 mg total) by mouth daily    gemfibrozil (LOPID) 600 mg tablet TAKE 1 TABLET BY MOUTH EVERY DAY    lactulose (CHRONULAC) 10 g/15 mL solution TAKE 45 ML (30 G TOTAL) BY MOUTH 2 (TWO) TIMES A DAY (Patient taking differently: as needed TAKE 45 ML (30 G TOTAL) BY MOUTH 2 (TWO) TIMES A DAY)    LORazepam (ATIVAN) 0 5 mg tablet Take 1 tablet (0 5 mg total) by mouth every 6 (six) hours as needed for anxiety (or nausea)    Multiple Vitamin (MULTIVITAMIN) tablet Take 1 tablet by mouth daily    naloxone (NARCAN) 4 mg/0 1 mL nasal spray Administer 1 spray into a nostril  If no response after 2-3 minutes, give another dose in the other nostril using a new spray   omeprazole (PriLOSEC) 20 mg delayed release capsule Take 20 mg by mouth daily    ondansetron (ZOFRAN) 8 mg tablet Take 1 tablet (8 mg total) by mouth every 8 (eight) hours as needed for nausea or vomiting    oxybutynin (DITROPAN XL) 15 MG 24 hr tablet TAKE 1 TABLET BY MOUTH DAILY AT BEDTIME    phenazopyridine (PYRIDIUM) 200 mg tablet Take 1 tablet (200 mg total) by mouth daily as needed for bladder spasms    quinapril (ACCUPRIL) 5 mg tablet TAKE 1 TABLET BY MOUTH EVERYDAY AT BEDTIME    rucaparib (RUBRACA) 300 mg tablet Take 600 mg by mouth every 12 (twelve) hours Take with or without food  Do not repeat a vomited dose   saccharomyces boulardii (FLORASTOR) 250 mg capsule Take 1 capsule (250 mg total) by mouth 2 (two) times a day    venlafaxine (EFFEXOR) 75 mg tablet Take 75 mg by mouth 3 (three) times a day      ciprofloxacin (CIPRO) 500 mg tablet TAKE 1 TABLET BY MOUTH 1 HOUR PRIOR TO TUBE CHANGE (Patient not taking: Reported on 1/21/2022)    CVS Melatonin 3 MG TAKE 1 TABLET (3 MG TOTAL) BY MOUTH DAILY AT BEDTIME (Patient not taking: Reported on 2/17/2022)    Sodium Chloride Flush (Normal Saline Flush) 0 9 % SOLN  (Patient not taking: Reported on 11/16/2021 )     No current facility-administered medications on file prior to visit  She is allergic to cephalosporins       Review of Systems   Constitutional: Negative  HENT: Negative  Respiratory: Negative  Cardiovascular: Negative            Objective:      /64 (BP Location: Left arm, Patient Position: Sitting, Cuff Size: Standard)   Pulse 68   Temp 97 6 °F (36 4 °C) (Temporal)   Resp 16   Ht 4' 10 5" (1 486 m)   Wt 58 5 kg (129 lb)   LMP  (LMP Unknown)   SpO2 99%   BMI 26 50 kg/m²          Physical Exam  HENT:      Head: Normocephalic and atraumatic  Right Ear: Tympanic membrane normal       Left Ear: Tympanic membrane normal       Nose: Nose normal    Cardiovascular:      Rate and Rhythm: Normal rate and regular rhythm  Pulmonary:      Effort: Pulmonary effort is normal       Breath sounds: Normal breath sounds  Musculoskeletal:      Cervical back: Neck supple  Neurological:      Mental Status: She is alert

## 2022-02-17 NOTE — PATIENT INSTRUCTIONS
Medicare Preventive Visit Patient Instructions  Thank you for completing your Welcome to Medicare Visit or Medicare Annual Wellness Visit today  Your next wellness visit will be due in one year (2/18/2023)  The screening/preventive services that you may require over the next 5-10 years are detailed below  Some tests may not apply to you based off risk factors and/or age  Screening tests ordered at today's visit but not completed yet may show as past due  Also, please note that scanned in results may not display below  Preventive Screenings:  Service Recommendations Previous Testing/Comments   Colorectal Cancer Screening  * Colonoscopy    * Fecal Occult Blood Test (FOBT)/Fecal Immunochemical Test (FIT)  * Fecal DNA/Cologuard Test  * Flexible Sigmoidoscopy Age: 54-65 years old   Colonoscopy: every 10 years (may be performed more frequently if at higher risk)  OR  FOBT/FIT: every 1 year  OR  Cologuard: every 3 years  OR  Sigmoidoscopy: every 5 years  Screening may be recommended earlier than age 48 if at higher risk for colorectal cancer  Also, an individualized decision between you and your healthcare provider will decide whether screening between the ages of 74-80 would be appropriate  Colonoscopy: Not on file  FOBT/FIT: 10/05/2019  Cologuard: Not on file  Sigmoidoscopy: Not on file          Breast Cancer Screening Age: 36 years old  Frequency: every 1-2 years  Not required if history of left and right mastectomy Mammogram: 06/24/2019        Cervical Cancer Screening Between the ages of 21-29, pap smear recommended once every 3 years  Between the ages of 33-67, can perform pap smear with HPV co-testing every 5 years     Recommendations may differ for women with a history of total hysterectomy, cervical cancer, or abnormal pap smears in past  Pap Smear: Not on file        Hepatitis C Screening Once for adults born between [facility]  More frequently in patients at high risk for Hepatitis C Hep C Antibody: Not on file        Diabetes Screening 1-2 times per year if you're at risk for diabetes or have pre-diabetes Fasting glucose: 91 mg/dL   A1C: 4 8    Screening Current   Cholesterol Screening Once every 5 years if you don't have a lipid disorder  May order more often based on risk factors  Lipid panel: 11/01/2018    Screening Current     Other Preventive Screenings Covered by Medicare:  1  Abdominal Aortic Aneurysm (AAA) Screening: covered once if your at risk  You're considered to be at risk if you have a family history of AAA  2  Lung Cancer Screening: covers low dose CT scan once per year if you meet all of the following conditions: (1) Age 50-69; (2) No signs or symptoms of lung cancer; (3) Current smoker or have quit smoking within the last 15 years; (4) You have a tobacco smoking history of at least 30 pack years (packs per day multiplied by number of years you smoked); (5) You get a written order from a healthcare provider  3  Glaucoma Screening: covered annually if you're considered high risk: (1) You have diabetes OR (2) Family history of glaucoma OR (3)  aged 48 and older OR (3)  American aged 72 and older  3  Osteoporosis Screening: covered every 2 years if you meet one of the following conditions: (1) You're estrogen deficient and at risk for osteoporosis based off medical history and other findings; (2) Have a vertebral abnormality; (3) On glucocorticoid therapy for more than 3 months; (4) Have primary hyperparathyroidism; (5) On osteoporosis medications and need to assess response to drug therapy  · Last bone density test (DXA Scan): Not on file  5  HIV Screening: covered annually if you're between the age of 12-76  Also covered annually if you are younger than 13 and older than 72 with risk factors for HIV infection  For pregnant patients, it is covered up to 3 times per pregnancy      Immunizations:  Immunization Recommendations   Influenza Vaccine Annual influenza vaccination during flu season is recommended for all persons aged >= 6 months who do not have contraindications   Pneumococcal Vaccine (Prevnar and Pneumovax)  * Prevnar = PCV13  * Pneumovax = PPSV23   Adults 25-60 years old: 1-3 doses may be recommended based on certain risk factors  Adults 72 years old: Prevnar (PCV13) vaccine recommended followed by Pneumovax (PPSV23) vaccine  If already received PPSV23 since turning 65, then PCV13 recommended at least one year after PPSV23 dose  Hepatitis B Vaccine 3 dose series if at intermediate or high risk (ex: diabetes, end stage renal disease, liver disease)   Tetanus (Td) Vaccine - COST NOT COVERED BY MEDICARE PART B Following completion of primary series, a booster dose should be given every 10 years to maintain immunity against tetanus  Td may also be given as tetanus wound prophylaxis  Tdap Vaccine - COST NOT COVERED BY MEDICARE PART B Recommended at least once for all adults  For pregnant patients, recommended with each pregnancy  Shingles Vaccine (Shingrix) - COST NOT COVERED BY MEDICARE PART B  2 shot series recommended in those aged 48 and above     Health Maintenance Due:      Topic Date Due    Hepatitis C Screening  Never done    DXA SCAN  Never done    HIV Screening  Never done    Breast Cancer Screening: Mammogram  06/24/2020    Colorectal Cancer Screening  10/05/2020     Immunizations Due:      Topic Date Due    Pneumococcal Vaccine: Pediatrics (0 to 5 Years) and At-Risk Patients (6 to 59 Years) (1 of 4 - PCV13) Never done    DTaP,Tdap,and Td Vaccines (1 - Tdap) Never done    COVID-19 Vaccine (4 - Booster for Pfizer series) 02/18/2022     Advance Directives   What are advance directives? Advance directives are legal documents that state your wishes and plans for medical care  These plans are made ahead of time in case you lose your ability to make decisions for yourself   Advance directives can apply to any medical decision, such as the treatments you want, and if you want to donate organs  What are the types of advance directives? There are many types of advance directives, and each state has rules about how to use them  You may choose a combination of any of the following:  · Living will: This is a written record of the treatment you want  You can also choose which treatments you do not want, which to limit, and which to stop at a certain time  This includes surgery, medicine, IV fluid, and tube feedings  · Durable power of  for healthcare [facility]): This is a written record that states who you want to make healthcare choices for you when you are unable to make them for yourself  This person, called a proxy, is usually a family member or a friend  You may choose more than 1 proxy  · Do not resuscitate (DNR) order:  A DNR order is used in case your heart stops beating or you stop breathing  It is a request not to have certain forms of treatment, such as CPR  A DNR order may be included in other types of advance directives  · Medical directive: This covers the care that you want if you are in a coma, near death, or unable to make decisions for yourself  You can list the treatments you want for each condition  Treatment may include pain medicine, surgery, blood transfusions, dialysis, IV or tube feedings, and a ventilator (breathing machine)  · Values history: This document has questions about your views, beliefs, and how you feel and think about life  This information can help others choose the care that you would choose  Why are advance directives important? An advance directive helps you control your care  Although spoken wishes may be used, it is better to have your wishes written down  Spoken wishes can be misunderstood, or not followed  Treatments may be given even if you do not want them  An advance directive may make it easier for your family to make difficult choices about your care     Urinary Incontinence   Urinary incontinence (UI)  is when you lose control of your bladder  UI develops because your bladder cannot store or empty urine properly  The 3 most common types of UI are stress incontinence, urge incontinence, or both  Medicines:   · May be given to help strengthen your bladder control  Report any side effects of medication to your healthcare provider  Do pelvic muscle exercises often:  Your pelvic muscles help you stop urinating  Squeeze these muscles tight for 5 seconds, then relax for 5 seconds  Gradually work up to squeezing for 10 seconds  Do 3 sets of 15 repetitions a day, or as directed  This will help strengthen your pelvic muscles and improve bladder control  Train your bladder:  Go to the bathroom at set times, such as every 2 hours, even if you do not feel the urge to go  You can also try to hold your urine when you feel the urge to go  For example, hold your urine for 5 minutes when you feel the urge to go  As that becomes easier, hold your urine for 10 minutes  Self-care:   · Keep a UI record  Write down how often you leak urine and how much you leak  Make a note of what you were doing when you leaked urine  · Drink liquids as directed  You may need to limit the amount of liquid you drink to help control your urine leakage  Do not drink any liquid right before you go to bed  Limit or do not have drinks that contain caffeine or alcohol  · Prevent constipation  Eat a variety of high-fiber foods  Good examples are high-fiber cereals, beans, vegetables, and whole-grain breads  Walking is the best way to trigger your intestines to have a bowel movement  · Exercise regularly and maintain a healthy weight  Weight loss and exercise will decrease pressure on your bladder and help you control your leakage  · Use a catheter as directed  to help empty your bladder  A catheter is a tiny, plastic tube that is put into your bladder to drain your urine  · Go to behavior therapy as directed    Behavior therapy may be used to help you learn to control your urge to urinate  Weight Management   Why it is important to manage your weight:  Being overweight increases your risk of health conditions such as heart disease, high blood pressure, type 2 diabetes, and certain types of cancer  It can also increase your risk for osteoarthritis, sleep apnea, and other respiratory problems  Aim for a slow, steady weight loss  Even a small amount of weight loss can lower your risk of health problems  How to lose weight safely:  A safe and healthy way to lose weight is to eat fewer calories and get regular exercise  You can lose up about 1 pound a week by decreasing the number of calories you eat by 500 calories each day  Healthy meal plan for weight management:  A healthy meal plan includes a variety of foods, contains fewer calories, and helps you stay healthy  A healthy meal plan includes the following:  · Eat whole-grain foods more often  A healthy meal plan should contain fiber  Fiber is the part of grains, fruits, and vegetables that is not broken down by your body  Whole-grain foods are healthy and provide extra fiber in your diet  Some examples of whole-grain foods are whole-wheat breads and pastas, oatmeal, brown rice, and bulgur  · Eat a variety of vegetables every day  Include dark, leafy greens such as spinach, kale, chalino greens, and mustard greens  Eat yellow and orange vegetables such as carrots, sweet potatoes, and winter squash  · Eat a variety of fruits every day  Choose fresh or canned fruit (canned in its own juice or light syrup) instead of juice  Fruit juice has very little or no fiber  · Eat low-fat dairy foods  Drink fat-free (skim) milk or 1% milk  Eat fat-free yogurt and low-fat cottage cheese  Try low-fat cheeses such as mozzarella and other reduced-fat cheeses  · Choose meat and other protein foods that are low in fat  Choose beans or other legumes such as split peas or lentils   Choose fish, skinless poultry (chicken or turkey), or lean cuts of red meat (beef or pork)  Before you cook meat or poultry, cut off any visible fat  · Use less fat and oil  Try baking foods instead of frying them  Add less fat, such as margarine, sour cream, regular salad dressing and mayonnaise to foods  Eat fewer high-fat foods  Some examples of high-fat foods include french fries, doughnuts, ice cream, and cakes  · Eat fewer sweets  Limit foods and drinks that are high in sugar  This includes candy, cookies, regular soda, and sweetened drinks  Exercise:  Exercise at least 30 minutes per day on most days of the week  Some examples of exercise include walking, biking, dancing, and swimming  You can also fit in more physical activity by taking the stairs instead of the elevator or parking farther away from stores  Ask your healthcare provider about the best exercise plan for you  © Copyright [employer] 2018 Information is for End User's use only and may not be sold, redistributed or otherwise used for commercial purposes   All illustrations and images included in CareNotes® are the copyrighted property of [employer]  or [address] [place] [address]

## 2022-02-23 ENCOUNTER — NUTRITION (OUTPATIENT)
Dept: NUTRITION | Facility: CLINIC | Age: 67
End: 2022-02-23

## 2022-02-23 DIAGNOSIS — Z71.3 NUTRITIONAL COUNSELING: Primary | ICD-10-CM

## 2022-02-23 NOTE — PATIENT INSTRUCTIONS
Nutrition Rx & Recommendations:  · Diet: High Calorie, High Protein (for high calorie foods see pages 52-53, and for high protein foods see pages 49-51 in your Eating Hints book)  · Small, frequent meals/snacks may be easier to tolerate than 3 large daily meals  Aim for 5-6 small meals per day (every 2-3 hours)  · Include protein at all meals/snacks  · Avoid spicy, acidic, sharp/hard/crunchy foods & carbonated beverages as needed  · Follow proper oral care; Try baking soda/salt water rinse recipe (mix 3/4 tsp salt + 1 tsp baking soda + 1 qt water; rinse with plain water after using) in Eating Hints book (pg 18)  Brush your teeth before/after meals & before bed  · For appetite loss: try powdered or liquid nutrition supplements; eating by the clock every 2-3 hours; set a timer to remind yourself to eat, keep snacks nearby; add extra protein & calories to your diet; drink liquids in between meals, choose liquids that add calories; eat a bedtime snack; eat soft, cool or frozen foods; eat a larger meal when you feel well & rested; only sip small amounts of liquids during meals (see pages 10-12 in your Eating Hints book)  · For weight loss: monitor your weight at home at least 2x/week, record your weight, start by adding 250-500 extra calories per day, eat 5-6 small scheduled meals every 2-3 hrs, choose foods that are high in protein and calories (see pages 49-53 in your Eating Hints book), drink liquids with calories for example: milkshakes/smoothies/juice/soup/whole milk/chocolate milk, cook with protein fortified milk (see recipe on page 36 in your Eating Hints book), consider ready-to-drink oral nutrition supplements such as Ensure Plus, Ensure Enlive, Boost Plus, or Boost Very High Calorie, avoid "diet" and "light" foods when possible, avoid drinking too much with meals, contact your dietitian with any continued weight loss over the course of 1 week    For more info see pages 35-37 in your Eating Hints book   · For constipation: drink plenty of fluids (>64 oz/day); drink hot liquids; eat high-fiber foods as tolerated (whole grains, beans, peas, nuts, seeds, fruits, vegetables, etc); increase physical activity as tolerated  Avoid increasing fiber intake too quickly, add fiber into your diet slowly; keep a record of your bowel movements (see pages 13-14 in you Eating Hints book)  · For sore mouth/throat: choose foods that are easy to chew/swallow; cook foods until they are soft/tender; moisten & soften foods (gravy/sauce/broth/yogurt); cut food into small pieces; drink with a straw (as tolerated); eat with a very small spoon; eat cold or room temperature food; suck on ice chips; Avoid citrus, spicy foods, tomatoes/ketchup, salty foods, raw vegetables, sharp/crunchy/hard foods & alcohol (see pages 23-28 in your Eating Hints book)  · For dry mouth: sip water throughout the day; try very sweet (or tart, as tolerated) drinks; chew gum or suck on hard candy, popsicles, & ice chips; eat easy to swallow foods (such as pureed cooked foods or soups); moisten food with sauce/gravy/salad dressing; do not drink beer, wine, or any type of alcohol; keep lips moist with lip balm; avoid tobacco products & second-hand smoke; try Biotene as needed (see pages 17-18 in your Eating Hints book)  Follow proper oral care; Try baking soda/salt water rinse recipe (mix 3/4 tsp salt + 1 tsp baking soda + 1 qt water; rinse with pain water after using) in Eating Hints book (pg 18)  Brush your teeth before/after meals & before bed  · Weigh yourself regularly  If you notice weight loss, make an effort to increase your daily food/calorie intake  If you continue to notice loss after these efforts, reach out to your dietitian to establish a plan to stabilize weight     · Snack ideas: [place] yogurt with fruit, pudding, veggies with humus OR try greek yogurt based vegetable dip, peanut butter, grapes and cheese, fruit with a cheese stick , cottage cheese, soup, unjury, protein shakes/smoothies      Patient choosen goals:  · Continue to write down food intake each day  · Nutrition Supplements:  Aim for 1 Unjury protein shake daily  · Re-start baking soda salt water rinses for mouth sores  · Re-start apple prune sauce for constipation      Follow Up Plan: 3/7/22 during infusion   Recommend Referral to Other Providers: none at this time

## 2022-02-24 ENCOUNTER — OFFICE VISIT (OUTPATIENT)
Dept: BARIATRICS | Facility: CLINIC | Age: 67
End: 2022-02-24

## 2022-02-24 VITALS — BODY MASS INDEX: 24.88 KG/M2 | HEIGHT: 59 IN | WEIGHT: 123.4 LBS

## 2022-02-24 DIAGNOSIS — K91.2 POSTSURGICAL MALABSORPTION: Primary | ICD-10-CM

## 2022-02-24 PROCEDURE — 3008F BODY MASS INDEX DOCD: CPT | Performed by: INTERNAL MEDICINE

## 2022-02-24 PROCEDURE — RECHECK

## 2022-02-24 NOTE — PROGRESS NOTES
Bariatric Follow Up Nutrition Note      Type of surgery  Gastric bypass: laparoscopic  Surgery Date: 2001  21 years  post-op  Surgeon: Dr Jasen Melo  61 y o   female    Dx with endometrial cancer in 2019:  Lost 100lbs since  Weight on Day of Weight Loss Surgery: 270lbs  Nelson with surgery:  200lbs, then gained about 30lbs until dx with CA in 2019 and since has lost 100lbs  Weight in (lb) to have BMI = 25: 122#  Pre-Op Excess Wt: 148#  Post-Op Wt Loss: 146# --most of that weight loss has been since her cancer dx in 2019, not related to surgery  Weight change since last in office 1 months ago:  -12lbs, but is to maintain or gain  Estimate needs to maintain/gain 0 5 # per week:  0238-2041 cals  protein needs:  72-92gm (average about 80gm per day)    Weight (last 2 days)     Date/Time Weight    02/24/22 1059 56 (123 4)        Body mass index is 25 35 kg/m²  Review of History and Medications   Really bad dry mouth and pain  Taking biotein mouthwash and was just given recipe for another mouthwash that can hlep      Past Medical History:   Diagnosis Date    Anemia     Chemotherapy follow-up examination     Depression     Endometrial cancer ([facility] [address] ) 12/2017    Hyperglycemia     vx type 2 dm -- last assessed 4/1/14; resolved 11/7/17    Hyperlipidemia     Hypertension     Obesity     last assessed 10/14/17; resolved 11/7/17     Past Surgical History:   Procedure Laterality Date    ABDOMINAL SURGERY      GASTRIC BYPASS    CHOLECYSTECTOMY      at the time of gastric bypass    COLONOSCOPY      CT NEEDLE BIOPSY LYMPH NODE  7/8/2019    FL GUIDED CENTRAL VENOUS ACCESS DEVICE INSERTION  11/12/2019    GASTRIC BYPASS      HYSTERECTOMY Bilateral     total abdominal with salpingo-oophorectomy    IR NEPHROSTOMY TUBE PLACEMENT  7/26/2019    IR NEPHROURETERAL STENT CHECK/CHANGE/REPOSITION  4/6/2021    IR NEPHROURETERAL STENT CHECK/CHANGE/REPOSITION  6/4/2021    IR NEPHROURETERAL STENT CHECK/CHANGE/REPOSITION  9/3/2021    IR NEPHROURETERAL STENT CHECK/CHANGE/REPOSITION  12/7/2021    IR PICC LINE  9/27/2019    IR PORT PLACEMENT  7/26/2019    IR PORT REMOVAL  9/20/2019    OOPHORECTOMY Bilateral     OR INSJ TUNNELED CTR VAD W/SUBQ PORT AGE 5 YR/> Left 11/12/2019    Procedure: INSERTION VENOUS PORT ( PORT-A-CATH) IR;  Surgeon: Maura Espino DO;  Location: [facility];  Service: Interventional Radiology    OR LAP, RADICAL HYST W/ TUBE&OV, NODE BX N/A 12/19/2017    Procedure: HYSTERECTOMY LAPAROSCOPIC TOTAL ([address]) W/ ROBOTICS; BILATERAL SALPINGOOPHERECTOMY; LYMPH NODE DISSECTION; lysis of adhesions;  Surgeon: Aide Vaughn MD;  [facility]: [facility];  Service: Gynecology Oncology    OR LAP,DIAGNOSTIC ABDOMEN N/A 12/19/2017    Procedure: LAPAROSCOPY DIAGNOSTIC;  Surgeon: Aide Vaughn MD;  [facility];  Service: Gynecology Oncology    TONSILLECTOMY      US GUIDED BREAST BIOPSY RIGHT COMPLETE Right 6/28/2019     Social History     Socioeconomic History    Marital status: Single     Spouse name: Not on file    Number of children: Not on file    Years of education: Not on file    Highest education level: Not on file   Occupational History    Not on file   Tobacco Use    Smoking status: Never Smoker    Smokeless tobacco: Never Used   Vaping Use    Vaping Use: Never used   Substance and Sexual Activity    Alcohol use: Not Currently    Drug use: No    Sexual activity: Not Currently   Other Topics Concern    Not on file   Social History Narrative    Not on file     Social Determinants of Health     Financial Resource Strain: Not on file   Food Insecurity: Not on file   Transportation Needs: Not on file   Physical Activity: Not on file   Stress: Not on file   Social Connections: Not on file   Intimate Partner Violence: Not on file   Housing Stability: Not on file       Current Outpatient Medications:     acetaminophen-codeine (TYLENOL #3) 300-30 mg per tablet, Take 1 tablet by mouth every 6 (six) hours as needed for moderate pain, Disp: 20 tablet, Rfl: 0    ARIPiprazole (ABILIFY) 10 mg tablet, Take 20 mg by mouth daily  , Disp: , Rfl:     aspirin (ECOTRIN LOW STRENGTH) 81 mg EC tablet, Take 81 mg by mouth daily, Disp: , Rfl:     Calcium-Magnesium-Vitamin D (CALCIUM 500 PO), Take by mouth daily , Disp: , Rfl:     cholecalciferol (VITAMIN D3) 1,000 units tablet, Take 1,000 Units by mouth daily, Disp: , Rfl:     ciprofloxacin (CIPRO) 500 mg tablet, TAKE 1 TABLET BY MOUTH 1 HOUR PRIOR TO TUBE CHANGE (Patient not taking: Reported on 1/21/2022), Disp: , Rfl:     [employer] Melatonin 3 MG, TAKE 1 TABLET (3 MG TOTAL) BY MOUTH DAILY AT BEDTIME (Patient not taking: Reported on 2/17/2022), Disp: 90 tablet, Rfl: 1    Cyanocobalamin (VITAMIN B12 PO), Take by mouth daily , Disp: , Rfl:     dronabinol (MARINOL) 2 5 mg capsule, Take 1 capsule (2 5 mg total) by mouth 2 (two) times a day before meals, Disp: 60 capsule, Rfl: 0    enoxaparin (LOVENOX) 100 mg/mL, INJECT 1 ML (100 MG TOTAL) UNDER THE SKIN EVERY 24 HOURS, Disp: 30 mL, Rfl: 5    folic acid (FOLVITE) 1 mg tablet, Take 1 tablet (1 mg total) by mouth daily, Disp: , Rfl: 0    gemfibrozil (LOPID) 600 mg tablet, TAKE 1 TABLET BY MOUTH EVERY DAY, Disp: 90 tablet, Rfl: 1    lactulose (CHRONULAC) 10 g/15 mL solution, TAKE 45 ML (30 G TOTAL) BY MOUTH 2 (TWO) TIMES A DAY (Patient taking differently: as needed TAKE 45 ML (30 G TOTAL) BY MOUTH 2 (TWO) TIMES A DAY), Disp: 300 mL, Rfl: 2    LORazepam (ATIVAN) 0 5 mg tablet, Take 1 tablet (0 5 mg total) by mouth every 6 (six) hours as needed for anxiety (or nausea), Disp: 36 tablet, Rfl: 1    Multiple Vitamin (MULTIVITAMIN) tablet, Take 1 tablet by mouth daily, Disp: , Rfl:     naloxone (NARCAN) 4 mg/0 1 mL nasal spray, Administer 1 spray into a nostril   If no response after 2-3 minutes, give another dose in the other nostril using a new spray , Disp: 1 each, Rfl: 0   omeprazole (PriLOSEC) 20 mg delayed release capsule, Take 20 mg by mouth daily, Disp: , Rfl:     ondansetron (ZOFRAN) 8 mg tablet, Take 1 tablet (8 mg total) by mouth every 8 (eight) hours as needed for nausea or vomiting, Disp: 30 tablet, Rfl: 1    oxybutynin (DITROPAN XL) 15 MG 24 hr tablet, TAKE 1 TABLET BY MOUTH DAILY AT BEDTIME, Disp: 90 tablet, Rfl: 3    phenazopyridine (PYRIDIUM) 200 mg tablet, Take 1 tablet (200 mg total) by mouth daily as needed for bladder spasms, Disp: 30 tablet, Rfl: 0    quinapril (ACCUPRIL) 5 mg tablet, TAKE 1 TABLET BY MOUTH EVERYDAY AT BEDTIME, Disp: 90 tablet, Rfl: 0    rucaparib (RUBRACA) 300 mg tablet, Take 600 mg by mouth every 12 (twelve) hours Take with or without food  Do not repeat a vomited dose , Disp: , Rfl:     saccharomyces boulardii (FLORASTOR) 250 mg capsule, Take 1 capsule (250 mg total) by mouth 2 (two) times a day, Disp: , Rfl: 0    Sodium Chloride Flush (Normal Saline Flush) 0 9 % SOLN, , Disp: , Rfl:     venlafaxine (EFFEXOR) 75 mg tablet, Take 75 mg by mouth 3 (three) times a day  , Disp: , Rfl:      Multi 2x/day  Calcium 2x/day  B12 1x/day  Vitamin D  Folic acid  Iron  Probiotics 2x/day    Food Intake and Lifestyle Assessment   Food Intake Assessment:  · Pt was dx with endometrial cancer in 2019 and has seen 100# weight loss since  She is interested in at least maintaining her current weight  · Was started on Marinol 2x/day (sometimes forgets the 2nd dose)  · Feels is hungry, but the mouth hurts too much so will portions are small  · Is going to start making a homemade mouthwash, using biotene and plans to get biotene  the gum    24hr recall:  Wakes:  8-9am   Breakfast: granola bar ( [employer]) OR eggs benedict w/1 egg  Snack: [employer]ani flip  Lunch: broccoli soup add unflavored unjury  Snack: pudding OR 2 pieces of PB and celery but celery too crunchy   Dinner: 4-6 shrimp + 1/2 roast beef sandwich w/wise      Pudding with meds--will maybe add protein powder  Snack: Frosty (1/2)    Beverage intake: water, sugar free beverages and sometimes lemonade with strawberry unjury  Diet texture/stage: soft  Protein supplement: unjury  Estimated protein intake per day:  If does the unjury protein powder will meet protien needs--Hasn't tried these again since we last spoke  Estimated fluid intake per day: 32-48oz water, large unsweet tea from DD (will go back to mixing Miralax in this) and occasional lemonade with unjury  Meals eaten away from home: one a day at dinner is most often take out  Typical meal pattern: 3 meals per day and 3 snacks per day--eats about every 2 hrs--will get back to this  Eating Behaviors: Appropriate portion sizes    Food allergies or intolerances: crunchy foods are hard to eat right now do to her mouth sores  Cultural or Hindu considerations: none    Physical Assessment  Nutrition Related Findings  Constipation--BM every 3-4 days  Now taking miralax every day w/ a stool soften which is helping  Mouth sores--needs softer foods, been using the biotene mouth wash and was given recipe by oncology RD for another mouthwash that ht can help  She feels this is limiting her intake the most      Physical Activity  Types of exercise: None  Current physical limitations: current with cancer dx    Psychosocial Assessment   Support systems: friend(s) relative(s)  Socioeconomic factors: retired    Nutrition Diagnosis  Diagnosis: Inadequate protein intake (NI-5 7  3)  Related to: Altered GI function and poor appetite due to cancer sx/chem meds  As Evidenced by: Unintentional weight loss     Interventions and Teaching   Patient educated on post-op nutrition guidelines  Patient educated and handouts provided    Capacity of post-surgery stomach  Adequate hydration  Nutrition considerations after surgery  Protein supplements  Meal planning and preparation  Appropriate carbohydrate, protein, and fat intake, and food/fluid choices to maximize safe weight loss, nutrient intake, and tolerance   Dietary and lifestyle changes  Techniques for self monitoring and keeping daily food journal  Vitamin / Mineral supplementation of Multivitamin with minerals, Calcium, Vitamin B12, Iron and Vitamin D    Education provided to: patient    Barriers to learning: No barriers identified    Readiness to change: action    Comprehension: verbalizes understanding     Expected Compliance: good    Pt is 18 years s/p RYGB  After surgery she got down to 200lbs, regained 30lbs, but in 2019 she was dx with endometrial cancer and has since lost 100lbs  Pt was maintaining her weight when she was here in the office one month ago, but since she has lost 12lbs  She feels she is hungry and eating frequently, but the portions aren't large  Her mouth sores are making it difficult to eat much  Encouraged smaller portions of high calorie foods such as PB, avocado and adding olive oil to foods, etc  Pt was provided with shake recipes from her oncology RD that she plans on trying  She is going to keep up with the food log to keep her accountable  Pt would prefer to f/u with her oncology RD since is it more convenient and more relevant to her current situation  RD agreed and pt will reach as she needs       Goals  Make smoothies/milk shakes at home  Work on controlling mouth pain  Try [employer] protein shakes  Use [employer]life milk in smoothies  Incorporate PB    Time Spent:   30 Minutes

## 2022-02-25 NOTE — PROGRESS NOTES
Outpatient Oncology Nutrition Consult  Type of Consult: Follow Up  Care Location: [facility]   Nutrition Assessment:  Oncology Diagnosis & Treatments:   · Endometrial cancer  · S/p surgery 12/19/17  · Recurrence 7/8/19  · S/p chemotherapy (carboplatin/taxol from 7/30/19-1/6/20)  · S/p whole pelvis RT (2/4/20- 4/13/20)  · Disease progression  · ENDOBAR trial started 12/21/20    Oncology History   Endometrial cancer ([facility] [address] )   11/17/2017 Initial Diagnosis    Endometrial cancer ([facility] [address] )     11/17/2017 Biopsy    ENDOMETRIAL BIOPSY: WELL DIFFERENTIATED ENDOMETRIAL ADENOCARCINOMA (FIGO I) WITH FOCALMUCINOUS FEATURES  Part B: ENDOCERVICAL POLYP:BENIGN ENDOCERVICAL      12/19/2017 Surgery    Robotic assisted total laparoscopic hysterectomy with bilateral salpingo-oophorectomy and sentinel bilateral pelvic lymph node dissection  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 (4 4 x 3 2 cm tumor, 9 4/15 4mm invasion, NO LVSI, washings revealed atypical cellular changes)     12/19/2017 Genetic Testing    Morrison testing negative     6/28/2019 Biopsy    A  Breast, Right, US BX Right Breast 1000 4 cmfn:  - Benign breast tissue with focal histiocytic aggregate  See comment   - Negative for atypia and in-situ or invasive carcinoma  7/8/2019 Recurrence    Presented with right lower extremity DVT and CT demonstrating right pelvic sidewall mass with venous, ureteral and nerve compression causing significant neuropathic pain  Biopsy:  Lymph Node, Right pelvic lymph node x3:  - High-grade adenocarcinoma  7/30/2019 - 1/6/2020 Chemotherapy    Taxol 175 mg/m2 and carboplatin AUC 6 every 21 days  Dose was reduced to taxol 135 mg/m2 and carboplatin AUC 5  Completed 6 cycles  Treatment protracted due to multiple hospital admissions       2/24/2020 - 4/13/2020 Radiation    adjuvant external beam radiation therapy to the whole pelvis to 4500 cGy followed by boost to gross disease of an additional 2200 cGy 11/23/2020 Progression    New necrotic adenopathy in the retroperitoneum on CT      12/21/2020 -  Chemotherapy    Began chemotherapy with rucaparib, atezolizumab, and bevacizumab as per [facility] clinical trial        PMH: RNYGB (2001 at [facility])    Past Medical History:   Diagnosis Date    Anemia     Chemotherapy follow-up examination     Depression     Endometrial cancer ([address] ) 12/2017    Hyperglycemia     vx type 2 dm -- last assessed 4/1/14; resolved 11/7/17    Hyperlipidemia     Hypertension     Obesity     last assessed 10/14/17; resolved 11/7/17     Past Surgical History:   Procedure Laterality Date    ABDOMINAL SURGERY      GASTRIC BYPASS    CHOLECYSTECTOMY      at the time of gastric bypass    COLONOSCOPY      CT NEEDLE BIOPSY LYMPH NODE  7/8/2019    FL GUIDED CENTRAL VENOUS ACCESS DEVICE INSERTION  11/12/2019    GASTRIC BYPASS      HYSTERECTOMY Bilateral     total abdominal with salpingo-oophorectomy    IR NEPHROSTOMY TUBE PLACEMENT  7/26/2019    IR NEPHROURETERAL STENT CHECK/CHANGE/REPOSITION  4/6/2021    IR NEPHROURETERAL STENT CHECK/CHANGE/REPOSITION  6/4/2021    IR NEPHROURETERAL STENT CHECK/CHANGE/REPOSITION  9/3/2021    IR NEPHROURETERAL STENT CHECK/CHANGE/REPOSITION  12/7/2021    IR PICC LINE  9/27/2019    IR PORT PLACEMENT  7/26/2019    IR PORT REMOVAL  9/20/2019    OOPHORECTOMY Bilateral     IA INSJ TUNNELED CTR VAD W/SUBQ PORT AGE 5 YR/> Left 11/12/2019    Procedure: INSERTION VENOUS PORT ( PORT-A-CATH) IR;  Surgeon: Silverio Escamilla DO;  [facility]: [facility];  Service: Interventional Radiology    IA LAP, RADICAL HYST W/ TUBE&OV, NODE BX N/A 12/19/2017    Procedure: HYSTERECTOMY LAPAROSCOPIC TOTAL ([address]) W/ ROBOTICS; BILATERAL SALPINGOOPHERECTOMY; LYMPH NODE DISSECTION; lysis of adhesions;  Surgeon: Rosalba Dorsey MD;  Location: [facility];  Service: Gynecology Oncology    IA LAP,DIAGNOSTIC ABDOMEN N/A 12/19/2017    Procedure: LAPAROSCOPY DIAGNOSTIC;  Surgeon: Wilber Kraus Marlen Sheets MD;  Location: [facility];  Service: Gynecology Oncology    TONSILLECTOMY      US GUIDED BREAST BIOPSY RIGHT COMPLETE Right 6/28/2019       Review of Medications:   Vitamins, Supplements and Herbals: yes: Hx RNYGB regimen: Vitamin D, Folic Acid, Align, calcium citrate TID, B12 1000 mcg QD, MVI BID (MVI does not have iron);  Iron 65 mg QD (2 hrs seperate from calcium)    Current Outpatient Medications:     acetaminophen-codeine (TYLENOL #3) 300-30 mg per tablet, Take 1 tablet by mouth every 6 (six) hours as needed for moderate pain, Disp: 20 tablet, Rfl: 0    ARIPiprazole (ABILIFY) 10 mg tablet, Take 20 mg by mouth daily  , Disp: , Rfl:     aspirin (ECOTRIN LOW STRENGTH) 81 mg EC tablet, Take 81 mg by mouth daily, Disp: , Rfl:     Calcium-Magnesium-Vitamin D (CALCIUM 500 PO), Take by mouth daily , Disp: , Rfl:     cholecalciferol (VITAMIN D3) 1,000 units tablet, Take 1,000 Units by mouth daily, Disp: , Rfl:     Cyanocobalamin (VITAMIN B12 PO), Take by mouth daily , Disp: , Rfl:     dronabinol (MARINOL) 2 5 mg capsule, Take 1 capsule (2 5 mg total) by mouth 2 (two) times a day before meals, Disp: 60 capsule, Rfl: 0    enoxaparin (LOVENOX) 100 mg/mL, INJECT 1 ML (100 MG TOTAL) UNDER THE SKIN EVERY 24 HOURS, Disp: 30 mL, Rfl: 5    folic acid (FOLVITE) 1 mg tablet, Take 1 tablet (1 mg total) by mouth daily, Disp: , Rfl: 0    gemfibrozil (LOPID) 600 mg tablet, TAKE 1 TABLET BY MOUTH EVERY DAY, Disp: 90 tablet, Rfl: 1    lactulose (CHRONULAC) 10 g/15 mL solution, TAKE 45 ML (30 G TOTAL) BY MOUTH 2 (TWO) TIMES A DAY (Patient taking differently: as needed TAKE 45 ML (30 G TOTAL) BY MOUTH 2 (TWO) TIMES A DAY), Disp: 300 mL, Rfl: 2    LORazepam (ATIVAN) 0 5 mg tablet, Take 1 tablet (0 5 mg total) by mouth every 6 (six) hours as needed for anxiety (or nausea), Disp: 36 tablet, Rfl: 1    Multiple Vitamin (MULTIVITAMIN) tablet, Take 1 tablet by mouth daily, Disp: , Rfl:     naloxone (NARCAN) 4 mg/0 1 mL nasal spray, Administer 1 spray into a nostril  If no response after 2-3 minutes, give another dose in the other nostril using a new spray , Disp: 1 each, Rfl: 0    omeprazole (PriLOSEC) 20 mg delayed release capsule, Take 20 mg by mouth daily, Disp: , Rfl:     ondansetron (ZOFRAN) 8 mg tablet, Take 1 tablet (8 mg total) by mouth every 8 (eight) hours as needed for nausea or vomiting, Disp: 30 tablet, Rfl: 1    oxybutynin (DITROPAN XL) 15 MG 24 hr tablet, TAKE 1 TABLET BY MOUTH DAILY AT BEDTIME, Disp: 90 tablet, Rfl: 3    phenazopyridine (PYRIDIUM) 200 mg tablet, Take 1 tablet (200 mg total) by mouth daily as needed for bladder spasms, Disp: 30 tablet, Rfl: 0    quinapril (ACCUPRIL) 5 mg tablet, TAKE 1 TABLET BY MOUTH EVERYDAY AT BEDTIME, Disp: 90 tablet, Rfl: 0    rucaparib (RUBRACA) 300 mg tablet, Take 600 mg by mouth every 12 (twelve) hours Take with or without food  Do not repeat a vomited dose , Disp: , Rfl:     saccharomyces boulardii (FLORASTOR) 250 mg capsule, Take 1 capsule (250 mg total) by mouth 2 (two) times a day, Disp: , Rfl: 0    Sodium Chloride Flush (Normal Saline Flush) 0 9 % SOLN,  , Disp: , Rfl:     venlafaxine (EFFEXOR) 75 mg tablet, Take 75 mg by mouth 3 (three) times a day  , Disp: , Rfl:   No current facility-administered medications for this visit      Facility-Administered Medications Ordered in Other Visits:     INV atezolizumab (INVESTIGATIONAL) 1,200 mg in sodium chloride 0 9 % 250 mL infusion, 1,200 mg, Intravenous, Once, Greta Colbert MD    INV bevacizumab (INVESTIGATIONAL) 878 mg in sodium chloride 0 9 % 64 88 mL IVPB, 878 mg, Intravenous, Once, Greta Colbert MD    sodium chloride 0 9 % infusion, 20 mL/hr, Intravenous, Continuous, Greta Colbert MD    Most Recent Lab Results:  Lab Results   Component Value Date    WBC 4 80 03/04/2022    IRON 92 11/19/2021    TIBC 374 11/19/2021    FERRITIN 1,429 (H) 11/19/2021    CHOLESTEROL 181 03/04/2022    CHOL 183 10/27/2017    TRIG 88 11/01/2018    HDL 59 11/01/2018    LDLCALC 116 (H) 11/01/2018    ALT 37 03/04/2022    AST 41 03/04/2022    ALB 3 6 03/04/2022     10/27/2017     05/12/2017    SODIUM 137 03/04/2022    SODIUM 133 (L) 02/11/2022    K 4 2 03/04/2022    K 4 0 02/11/2022     03/04/2022    BUN 18 03/04/2022    BUN 22 02/11/2022    CREATININE 0 99 03/04/2022    CREATININE 1 02 02/11/2022    EGFR 60 03/04/2022    PHOS 3 5 03/04/2022    PHOS 4 2 (H) 02/11/2022    GLUCOSE 219 (H) 12/19/2017    POCGLU 97 08/18/2020    GLUF 86 03/04/2022    GLUF 91 02/11/2022    GLUC 86 03/04/2022    HGBA1C 4 8 07/07/2021    HGBA1C 5 1 02/03/2020    HGBA1C 6 2 08/28/2019    CALCIUM 9 8 03/04/2022    FOLATE 5 4 10/06/2019    MG 1 9 03/04/2022     Anthropometric Measurements:   Height: 59"  Ht Readings from Last 1 Encounters:   03/07/22 4' 10 5" (1 486 m)     Wt Readings from Last 20 Encounters:   03/07/22 56 2 kg (124 lb)   03/04/22 55 1 kg (121 lb 8 oz)   02/24/22 56 kg (123 lb 6 4 oz)   02/17/22 58 5 kg (129 lb)   02/14/22 57 2 kg (126 lb)   02/11/22 58 5 kg (129 lb)   01/25/22 61 5 kg (135 lb 9 6 oz)   01/24/22 60 3 kg (133 lb)   01/21/22 61 2 kg (134 lb 14 7 oz)   01/21/22 61 2 kg (135 lb)   01/11/22 60 3 kg (133 lb)   01/03/22 61 kg (134 lb 8 oz)   12/30/21 61 2 kg (135 lb)   12/13/21 63 7 kg (140 lb 8 oz)   12/10/21 64 9 kg (143 lb)   12/09/21 65 3 kg (144 lb)   11/22/21 65 4 kg (144 lb 2 9 oz)   11/19/21 64 3 kg (141 lb 12 1 oz)   11/19/21 65 1 kg (143 lb 8 oz)   11/16/21 66 7 kg (147 lb)     Weight Hx:  · Usual Weight: 270# before RNYGB in 2001; lowest post-op wt: 200#; wt typically fluctuates between 215-230# (prior to wt loss)  · Varian: (2/25/20) 185 6#, (3/3/20) 188 6#, (3/10/20) 187 2#, (3/19/20) 186 2#, (3/24/20) 187#, (3/31/20) 185 4#, (4/10/20) 184  4#   · Home Scale Wts: (2/19/20) 185#, (8/3/20) 194#    Oncology Nutrition-Anthropometrics      Nutrition from 3/7/2022 in [facility]itian Services Nutrition from 2/23/2022 in [facility] Dietitian [facility]   Patient age (years): 61 years 61 years   Patient (female) height (in): 61 in 61 in   Current Weight to be used for anthropometric calculations (lbs) 124 lbs 129 lbs   Current Weight to be used for anthropometric calculations (kg) 56 4 kg 58 6 kg   BMI: 25 26 1   IBW female: 95 lbs 95 lbs   IBW (kg) female: 43 2 kg 43 2 kg   IBW % (female) 130 5 % 135 8 %   Adjusted BW (female): 102 2 lbs 103 5 lbs   Adjusted BW kg (female): 46 5 kg 47 kg   % weight change after 1 week: -- 0 %   Weight change after 1 week (lbs) -- 0 lbs   % weight change after 1 month: -3 9 % -4 4 %   Weight change after 1 month (lbs) -5 lbs -6 lbs   % weight change after 3 months: -13 9 % -12 2 %   Weight change after 3 months (lbs) -20 lbs -18 lbs   % weight change after 6 months: -21 % --   Weight change after 6 months (lbs) -33 lbs --   % weight change after 1 year: -34 % --         Nutrition-Focused Physical Findings:  moderate muscle depletion ([place]) - hollowing/scooping/depression and moderate body fat depletion (Orbital) - hollowing/depression/dark circles    Food/Nutrition-Related History & Client/Social History:    Current Nutrition Impact Symptoms:   [] Nausea   -gets dry heaves at times, pt thinks this is r/t stress and when has not had enough rest [x] Reduced Appetite -on appetite stimulants [] Acid Reflux   [] Vomiting  [x] Unintended Wt Loss - ongoing, significant, undesirable  -pt has been discussing psychological component of wt loss with her therapist  -cancer cachexia per GynOnc    [x] Malabsorption - S/p RNYGB in 2001   [] Diarrhea  [] Unintended Wt Gain  [] Dumping Syndrome   [x] Constipation - says BM's have been better, had BM Tues/Thurs last week  -forgot to try apple prune sauce (mixture has been successful for her in the past)  -On bowel protocol [] Thick Mucous/Secretions  [] Abdominal Pain    [] Dysgeusia (Altered Taste) [x] Xerostomia (Dry Mouth) -   -uses bs/sw rinses and Biotene [] Gas    [] Dysosmia (Altered Smell)  [] Thrush  [] Difficulty Chewing    [x] Oral Mucositis (Sore Mouth) - improved  -using biotene rinse and spray, now added back bs/sw rinses [x] Fatigue  [x] Pain: pubic area, limits po intake   [] Odynophagia   [] Esophagitis  [] Other:    [] Dysphagia [] Early Satiety  [] No Problems Eating      Food Allergies & Intolerances: yes: had skin patch testing which showed allergy to strawberries, but says she is able to eat strawberries without any side effects    Current Diet: Regular Diet, No Restrictions  Current Nutrition Intake: increased over the past 1 5 weeks per pt   Appetite: Good, Fair   Nutrition Route: PO   Oral Care: Biotene rinse and spray, bs/sw rinses  Activity level: Sedentary  Limited by pain and medical condition  24 Hr Diet Recall: continues to write down what she is eating; has a 3 oz cup that she plans to start using to portion meals (pt feels this is a good and reasonable goal for herself)  Breakfast: CIB mixed with 2% Fairlife milk (has been doing more of these in AM); yesterday: 1/2 eggs bendict  Snack: none; states appetite gets better ~10AM  Lunch: 10-12 shrimp with cocktail sauce, tostitos with salsa  Snack: none  Dinner: 1 steakhouse chicken wing with cayetano cheese, 3 oz steak, 2-3 asparagus ruiz  Snack: single svg velveeta mac and cheese    Beverages: water (16 9 oz x2-3), iced tea (32 oz x1, occasionally more), CIB mixed with Fairlife milk (16 oz x1)   -Does not drink coffee or alcohol      -Does not separate fluids from solids, does not have discomfort while eating and drinking at the same time    Supplements:    [employer] Essentials (5azk=045 kcal, 5 g pro) mixed with 2% Fairlife Milk - 16 oz x1   Unflavored Unjury Protein Powder mixed into foods - 1/2 serving daily - provided samples today of Bonjoy & protein centric MR    Does not like Ensure or Boost    Oncology Nutrition-Estimated Needs      Nutrition from 10/11/2021 in [place] [address] Oncology Dietitian Services Nutrition from 2021 in [place] [address] Oncology Dietitian Services   Weight type used Adjusted weight Adjusted weight   Weight in kilograms (kg) used for estimated needs 49 5 kg 50 7 kg   Energy needs formula:  35-40 kcal/kg 35-40 kcal/kg   Energy needs based on 35 kcal/k kcal 1775 kcal   Energy needs based on 40 kcal/k kcal 2029 kcal   Protein needs formula: 1 5-2 g/kg 1 5-2 g/kg   Protein needs based on 1 5 g/kg 74 g 76 g   Protein needs based on 2 g/kg 99 g 101 g   Fluid needs formula: 30-35 mL/kg 30-35 mL/kg   Fluid needs based on 30 mL/kg 1485 mL 1527 mL   Fluid needs in ounces 50 oz 52 oz   Fluid needs based on 35 mL/kg 1733 mL 1782 mL   Fluid needs in ounces 59 oz 60 oz        Discussion & Intervention:    Ragini was evaluated today for an RD follow up regarding wt loss and pt request for dietitian involvement throughout tx  Ragini is currently undergoing tx for endometrial cancer  Ragini continues to experience significant, undesirable weight loss  She says that her mouth sores are under better control now that she resumed the bs/sw rinses and added Biotene spray at night  She says that her po intake has improved over the past 1 5 weeks and describes her appetite as fair-good  She continues to be volume restricted and plans to start using a 3oz cup to portion her foods  Constipation continues to be an issue and she is working on a bowel regimen  She has added a CIB shake daily in the morning and continues to consume 1/2 serving of [place] daily  She is still writing down what she eats during the day to help herself be accountable  Ragini has recognized that she does best with self-directed goals and not being told what to do  Goals and plan of care were selected by pt today  Supportive listening and encouragement were provided     Reviewed 24 hour recall, which revealed an inadequate po intake, and discussed ways to increase kcal, protein, and fluid intakes and optimize nutrient intake  Also reviewed the importance of wt management throughout the tx process and the role of a high kcal/ high protein diet in managing wt and overall health  Based on today's assessment, discussion included: MNT for: wt loss, lack of appetite, sore mouth, xerostomia, practicing proper oral care, a high kcal/protein diet & food choices to include at all meals & snacks (Examples of high kcal foods: cheese, full-fat dairy products, nut butter, plant-based fats, coconut oil/milk, avocado, butter, cream soup, etc  Examples of high protein foods: eggs, chicken, fish, beans/legumes, nuts/nut butters, bone broth, etc ) , adequate hydration & fluid choices, sipping on calorie containing beverages (examples include: adding whole milk or cream to coffee, oral nutrition supplements, juice, electrolyte replacement beverages, milk, etc ), eating smaller more frequent meals every 2-3 hours (5-6 small meals/day), having consistent and planned snacks between meals, eating when feeling most hungry, increasing oral nutrition supplements and recipe suggestions/resources, trying new recipes, & cooking at home more often     Moving forward, Ragini was encouraged to increase kcal, protein, and fluid intakes  Materials Provided: unjury samples: bonjoy & protein centric meal replacement  All questions and concerns addressed during todays visit  Ragini has RD contact information  Nutrition Diagnosis:  Inadequate Energy Intake related to physiological causes, disease state and treatment related issues as evidenced by food recall, wt loss and discussion with pt and/or family  Increased Nutrient Needs (kcal & pro) related to increased demand for nutrients and disease state as evidenced by cancer dx and pt undergoing tx for cancer    Altered GI Function related to alteration in GI tract motility and integrity as evidenced by Constipation and Oral Mucositis (Sore Mouth)  Patient has clinical indicators (or ASPEN criteria) consistent with severe protein-calorie malnutrition in the context of Chronic Illness as evidenced by >7 5% wt loss in 3 months, >10% wt loss in 6 months, >/=20% wt loss in 12 months, </=75% energy intake vs  Estimated needs for >/=1 month, moderate muscle depletion ([place]) - hollowing/scooping/depression and moderate body fat depletion (Orbital) - hollowing/depression/dark circles  Monitoring & Evaluation:   Goals:  · weight maintenance/stabilization  · adequate nutrition impact symptom management  · pt to meet >/=75% estimated nutrition needs daily     · Progress Towards Goals: Progressing and Partially Met    Nutrition Rx & Recommendations:  · Diet: High Calorie, High Protein (for high calorie foods see pages 52-53, and for high protein foods see pages 49-51 in your Eating Hints book)  · Small, frequent meals/snacks may be easier to tolerate than 3 large daily meals  Aim for 5-6 small meals per day (every 2-3 hours)  · Include protein at all meals/snacks  · Avoid spicy, acidic, sharp/hard/crunchy foods & carbonated beverages as needed  · Follow proper oral care; Try baking soda/salt water rinse recipe (mix 3/4 tsp salt + 1 tsp baking soda + 1 qt water; rinse with plain water after using) in Eating Hints book (pg 18)  Brush your teeth before/after meals & before bed  · For appetite loss: try powdered or liquid nutrition supplements; eating by the clock every 2-3 hours; set a timer to remind yourself to eat, keep snacks nearby; add extra protein & calories to your diet; drink liquids in between meals, choose liquids that add calories; eat a bedtime snack; eat soft, cool or frozen foods; eat a larger meal when you feel well & rested; only sip small amounts of liquids during meals (see pages 10-12 in your Eating Hints book)    · For weight loss: monitor your weight at home at least 2x/week, record your weight, start by adding 250-500 extra calories per day, eat 5-6 small scheduled meals every 2-3 hrs, choose foods that are high in protein and calories (see pages 49-53 in your Eating Hints book), drink liquids with calories for example: milkshakes/smoothies/juice/soup/whole milk/chocolate milk, cook with protein fortified milk (see recipe on page 36 in your Eating Hints book), consider ready-to-drink oral nutrition supplements such as Ensure Plus, Ensure Enlive, Boost Plus, or Boost Very High Calorie, avoid "diet" and "light" foods when possible, avoid drinking too much with meals, contact your dietitian with any continued weight loss over the course of 1 week  For more info see pages 35-37 in your Eating Hints book  · For constipation: drink plenty of fluids (>64 oz/day); drink hot liquids; eat high-fiber foods as tolerated (whole grains, beans, peas, nuts, seeds, fruits, vegetables, etc); increase physical activity as tolerated  Avoid increasing fiber intake too quickly, add fiber into your diet slowly; keep a record of your bowel movements (see pages 13-14 in you Eating Hints book)  · For sore mouth/throat: choose foods that are easy to chew/swallow; cook foods until they are soft/tender; moisten & soften foods (gravy/sauce/broth/yogurt); cut food into small pieces; drink with a straw (as tolerated); eat with a very small spoon; eat cold or room temperature food; suck on ice chips; Avoid citrus, spicy foods, tomatoes/ketchup, salty foods, raw vegetables, sharp/crunchy/hard foods & alcohol (see pages 23-28 in your Eating Hints book)    · For dry mouth: sip water throughout the day; try very sweet (or tart, as tolerated) drinks; chew gum or suck on hard candy, popsicles, & ice chips; eat easy to swallow foods (such as pureed cooked foods or soups); moisten food with sauce/gravy/salad dressing; do not drink beer, wine, or any type of alcohol; keep lips moist with lip balm; avoid tobacco products & second-hand smoke; try Biotene as needed (see pages 17-18 in your Eating Hints book)  Follow proper oral care; Try baking soda/salt water rinse recipe (mix 3/4 tsp salt + 1 tsp baking soda + 1 qt water; rinse with pain water after using) in Eating Hints book (pg 18)  Brush your teeth before/after meals & before bed  · Weigh yourself regularly  If you notice weight loss, make an effort to increase your daily food/calorie intake  If you continue to notice loss after these efforts, reach out to your dietitian to establish a plan to stabilize weight  · Snack ideas: [place] yogurt with fruit, pudding, veggies with humus OR try greek yogurt based vegetable dip, peanut butter, grapes and cheese, fruit with a cheese stick , cottage cheese, soup, unjury, protein shakes/smoothies  · Continue to write down food intake each day  · Re-start apple prune sauce for constipation  Patient choosen goals:  · Nutrition Supplements:  Try to be more consistent with your Loi Elsy and aim for 2 svg/day; continue CIB mixed with Fairlife milk daily  · Try to increase fruit and vegetable intake at snacks  · Be more consistent with snacks      Follow Up Plan: 3/28/22 during infusion   Recommend Referral to Other Providers: none at this time

## 2022-02-28 ENCOUNTER — TELEPHONE (OUTPATIENT)
Dept: RADIOLOGY | Facility: HOSPITAL | Age: 67
End: 2022-02-28

## 2022-02-28 RX ORDER — SODIUM CHLORIDE 9 MG/ML
75 INJECTION, SOLUTION INTRAVENOUS CONTINUOUS
Status: CANCELLED | OUTPATIENT
Start: 2022-02-28

## 2022-02-28 NOTE — PRE-PROCEDURE INSTRUCTIONS
Pre-procedure Instructions for Interventional Radiology  [address] [place] [address] [address] [phone]    You are scheduled for a/an Right PCNU Exchange  On Tuesday 3/8/22  Your tentative arrival time is AM   Short stay will notify you the day before your procedure with the exact arrival time and the location to arrive  To prepare for your procedure:  1  Please arrange for someone to drive you home after the procedure and stay with you until the next morning if you are instructed to do so  This is typically for patients receiving some type of sedative or anesthetic for the procedure  2  DO NOT EAT OR DRINK ANYTHING after midnight on the evening before your procedure including candy & gum   3  ONLY SIPS OF WATER with your medications are allowed on the morning of your procedure  4  TAKE ALL OF YOUR REGULAR MEDICATIONS THE MORNING OF YOUR PROCEDURE with sips of water! We may call you to stop some of your blood sugar, blood pressure and blood thinning medications depending on the procedure  Please take all of these medications unless we instruct you to stop them  5  If you have an allergy to x-ray dye, please contact Interventional Radiology for an x-ray dye preparation which usually consists of an oral steroid and Benadryl  The day of your procedure:  1  Bring a list of the medications you take at home  2  Bring medications you take for breathing problems (such as inhalers), medications for chest pain, or both  3  Bring a case for your glasses or contacts  4  Bring your insurance card and a form of photo ID   5  Please leave all valuables such as credit cards and jewelry at home  6  Report to the registration desk in the main lobby at the [address], Entrance B  Ask to be directed to [facility]  7  While your procedure is being performed, your family may wait in the Radiology Waiting Room on the 1st floor in Radiology    if they need to leave, they may provide a number to be called following the procedure  8  Be prepared to stay overnight just in case  Sometimes procedures will indicate the need for further observation or treatment  9  If you are scheduled for a follow-up visit with the Interventional Radiologist after your procedure, you will be called with a date and time  10  Covid Vaccine and Booster completed      Special Instructions (Medications to stop taking before your procedure etc ):

## 2022-03-02 DIAGNOSIS — C54.1 ENDOMETRIAL CANCER (HCC): Primary | ICD-10-CM

## 2022-03-03 NOTE — PROGRESS NOTES
Assessment/Plan:    Problem List Items Addressed This Visit        Digestive    Drug induced constipation     Managed with medications            Genitourinary    Endometrial cancer ([facility] ) - Primary (Chronic)     61year-old with recurrent stage IB endometrial cancer who is receiving treatment per the [facility] trial presents for pre cycle 22 visit  CBC, CMP, mag reviewed and all hematologic parameters support continued treatment    UrP:C 1 15 increased from previous     Repeat scan in April per protocol  Continue with dosing pending repeat labs          Obstruction of right ureter     Discussed with patient possible attempt at capping and/or internalizing in the setting stable/improved disease  She will follow-up with Urology to assess further  Other    Cancer related pain     Followed by palliative         Cachexia ([facility] )     Malnutrition Findings:     muscle wasting with temporal mass loss  Continued weight loss with 2 lb weight loss since last visit  Decreased appetite       BMI Findings: Body mass index is 24 96 kg/m²  I discussed with patient increasing protein been continuing with small frequent meals  She has been started on appetite stimulants by palliative care                   CHIEF COMPLAINT: pre cycle 22      Problem:  Cancer Staging  Endometrial cancer ([place] [address] )  Staging form: Corpus Uteri - Carcinoma, AJCC 7th Edition  - Clinical stage from 12/19/2017: FIGO Stage IB (T1b, N0, M0) - Signed by Sigifredo Desir MD on 2/12/2018        Previous therapy:  Oncology History   Endometrial cancer ([facility] )   11/17/2017 Initial Diagnosis    Endometrial cancer ([facility] )     11/17/2017 Biopsy    ENDOMETRIAL BIOPSY: WELL DIFFERENTIATED ENDOMETRIAL ADENOCARCINOMA (FIGO I) WITH FOCALMUCINOUS FEATURES      Part B: ENDOCERVICAL POLYP:BENIGN ENDOCERVICAL      12/19/2017 Surgery    Robotic assisted total laparoscopic hysterectomy with bilateral salpingo-oophorectomy and sentinel bilateral pelvic lymph node dissection  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 (4 4 x 3 2 cm tumor, 9 4/15 4mm invasion, NO LVSI, washings revealed atypical cellular changes)     12/19/2017 Genetic Testing    Morrison testing negative     6/28/2019 Biopsy    A  Breast, Right, US BX Right Breast 1000 4 cmfn:  - Benign breast tissue with focal histiocytic aggregate  See comment   - Negative for atypia and in-situ or invasive carcinoma  7/8/2019 Recurrence    Presented with right lower extremity DVT and CT demonstrating right pelvic sidewall mass with venous, ureteral and nerve compression causing significant neuropathic pain  Biopsy:  Lymph Node, Right pelvic lymph node x3:  - High-grade adenocarcinoma  7/30/2019 - 1/6/2020 Chemotherapy    Taxol 175 mg/m2 and carboplatin AUC 6 every 21 days  Dose was reduced to taxol 135 mg/m2 and carboplatin AUC 5  Completed 6 cycles  Treatment protracted due to multiple hospital admissions  2/24/2020 - 4/13/2020 Radiation    adjuvant external beam radiation therapy to the whole pelvis to 4500 cGy followed by boost to gross disease of an additional 2200 cGy     11/23/2020 Progression    New necrotic adenopathy in the retroperitoneum on CT      12/21/2020 -  Chemotherapy    Began chemotherapy with rucaparib, atezolizumab, and bevacizumab as per [facility] clinical trial            Patient ID: Tash Hoyt is a 61 y o  female  65-year-old with recurrent stage IB endometrial cancer who is receiving treatment per the [facility] trial presents for pre cycle 22 visit  Last imaging 2/2022 with stable retroperitoneal disease and no new lesions  Patient overall is feeling at baseline  No difficulties with urination or her PCN  She has intermittent constipation which is managed by MiraLax and Colace  She does have difficulty with appetite and has been tolerating small frequent meals but with continued weight loss    Her performance status is a 1      The following portions of the patient's history were reviewed and updated as appropriate: allergies, current medications, past family history, past medical history, past social history, past surgical history and problem list     Review of Systems   Constitutional: Positive for appetite change and fatigue  Negative for chills and fever  Respiratory: Negative for chest tightness and shortness of breath  Gastrointestinal: Positive for constipation  Negative for abdominal distention, abdominal pain, diarrhea and nausea  Genitourinary: Negative for difficulty urinating, flank pain, frequency, urgency, vaginal bleeding, vaginal discharge and vaginal pain  Musculoskeletal: Negative for back pain, joint swelling and myalgias  Skin: Negative for rash  Neurological: Negative for dizziness, light-headedness, numbness and headaches         Current Outpatient Medications   Medication Sig Dispense Refill    acetaminophen-codeine (TYLENOL #3) 300-30 mg per tablet Take 1 tablet by mouth every 6 (six) hours as needed for moderate pain 20 tablet 0    ARIPiprazole (ABILIFY) 10 mg tablet Take 20 mg by mouth daily        aspirin (ECOTRIN LOW STRENGTH) 81 mg EC tablet Take 81 mg by mouth daily      Calcium-Magnesium-Vitamin D (CALCIUM 500 PO) Take by mouth daily       cholecalciferol (VITAMIN D3) 1,000 units tablet Take 1,000 Units by mouth daily      ciprofloxacin (CIPRO) 500 mg tablet TAKE 1 TABLET BY MOUTH 1 HOUR PRIOR TO TUBE CHANGE (Patient not taking: Reported on 1/21/2022)      [employer] Melatonin 3 MG TAKE 1 TABLET (3 MG TOTAL) BY MOUTH DAILY AT BEDTIME (Patient not taking: Reported on 2/17/2022) 90 tablet 1    Cyanocobalamin (VITAMIN B12 PO) Take by mouth daily       dronabinol (MARINOL) 2 5 mg capsule Take 1 capsule (2 5 mg total) by mouth 2 (two) times a day before meals 60 capsule 0    enoxaparin (LOVENOX) 100 mg/mL INJECT 1 ML (100 MG TOTAL) UNDER THE SKIN EVERY 24 HOURS 30 mL 5    folic acid (FOLVITE) 1 mg tablet Take 1 tablet (1 mg total) by mouth daily  0    gemfibrozil (LOPID) 600 mg tablet TAKE 1 TABLET BY MOUTH EVERY DAY 90 tablet 1    lactulose (CHRONULAC) 10 g/15 mL solution TAKE 45 ML (30 G TOTAL) BY MOUTH 2 (TWO) TIMES A DAY (Patient taking differently: as needed TAKE 45 ML (30 G TOTAL) BY MOUTH 2 (TWO) TIMES A DAY) 300 mL 2    LORazepam (ATIVAN) 0 5 mg tablet Take 1 tablet (0 5 mg total) by mouth every 6 (six) hours as needed for anxiety (or nausea) 36 tablet 1    Multiple Vitamin (MULTIVITAMIN) tablet Take 1 tablet by mouth daily      naloxone (NARCAN) 4 mg/0 1 mL nasal spray Administer 1 spray into a nostril  If no response after 2-3 minutes, give another dose in the other nostril using a new spray  1 each 0    omeprazole (PriLOSEC) 20 mg delayed release capsule Take 20 mg by mouth daily      ondansetron (ZOFRAN) 8 mg tablet Take 1 tablet (8 mg total) by mouth every 8 (eight) hours as needed for nausea or vomiting 30 tablet 1    oxybutynin (DITROPAN XL) 15 MG 24 hr tablet TAKE 1 TABLET BY MOUTH DAILY AT BEDTIME 90 tablet 3    phenazopyridine (PYRIDIUM) 200 mg tablet Take 1 tablet (200 mg total) by mouth daily as needed for bladder spasms 30 tablet 0    quinapril (ACCUPRIL) 5 mg tablet TAKE 1 TABLET BY MOUTH EVERYDAY AT BEDTIME 90 tablet 0    rucaparib (RUBRACA) 300 mg tablet Take 600 mg by mouth every 12 (twelve) hours Take with or without food  Do not repeat a vomited dose   saccharomyces boulardii (FLORASTOR) 250 mg capsule Take 1 capsule (250 mg total) by mouth 2 (two) times a day  0    Sodium Chloride Flush (Normal Saline Flush) 0 9 % SOLN  (Patient not taking: Reported on 11/16/2021 )      venlafaxine (EFFEXOR) 75 mg tablet Take 75 mg by mouth 3 (three) times a day         No current facility-administered medications for this visit  Objective:    not currently breastfeeding  There is no height or weight on file to calculate BMI  There is no height or weight on file to calculate BSA      Physical Exam  HENT:      Head: Normocephalic and atraumatic  Nose: Nose normal    Cardiovascular:      Rate and Rhythm: Normal rate and regular rhythm  Pulmonary:      Effort: Pulmonary effort is normal    Abdominal:      General: There is no distension  Palpations: Abdomen is soft  There is no mass  Genitourinary:     Comments: defer  Musculoskeletal:         General: No swelling  Normal range of motion  Cervical back: Normal range of motion  Skin:     General: Skin is warm and dry  Neurological:      General: No focal deficit present  Mental Status: She is alert     Psychiatric:         Mood and Affect: Mood normal            Lab Results   Component Value Date     10/27/2017    K 4 0 02/11/2022     02/11/2022    CO2 22 02/11/2022    BUN 22 02/11/2022    CREATININE 1 02 02/11/2022    GLUCOSE 219 (H) 12/19/2017    GLUF 91 02/11/2022    CALCIUM 10 0 02/11/2022    CORRECTEDCA 10 0 01/21/2022    AST 35 02/11/2022    ALT 42 02/11/2022    ALKPHOS 159 (H) 02/11/2022    PROT 6 9 10/27/2017    BILITOT 0 3 10/27/2017    EGFR 58 02/11/2022     Lab Results   Component Value Date    WBC 6 41 02/11/2022    HGB 9 9 (L) 02/11/2022    HCT 31 0 (L) 02/11/2022    MCV 99 (H) 02/11/2022     02/11/2022     Lab Results   Component Value Date    NEUTROABS 5 06 02/11/2022        Trend:  No results found for:

## 2022-03-04 ENCOUNTER — DOCUMENTATION (OUTPATIENT)
Dept: OTHER | Facility: HOSPITAL | Age: 67
End: 2022-03-04

## 2022-03-04 ENCOUNTER — HOSPITAL ENCOUNTER (OUTPATIENT)
Dept: INFUSION CENTER | Facility: CLINIC | Age: 67
Discharge: HOME | End: 2022-03-04
Payer: COMMERCIAL

## 2022-03-04 ENCOUNTER — OFFICE VISIT (OUTPATIENT)
Dept: GYNECOLOGIC ONCOLOGY | Facility: CLINIC | Age: 67
End: 2022-03-04
Payer: COMMERCIAL

## 2022-03-04 VITALS
WEIGHT: 121.5 LBS | RESPIRATION RATE: 16 BRPM | SYSTOLIC BLOOD PRESSURE: 120 MMHG | HEART RATE: 55 BPM | BODY MASS INDEX: 24.49 KG/M2 | TEMPERATURE: 97.7 F | DIASTOLIC BLOOD PRESSURE: 90 MMHG | HEIGHT: 59 IN | OXYGEN SATURATION: 97 %

## 2022-03-04 DIAGNOSIS — M80.00XS OSTEOPOROSIS WITH CURRENT PATHOLOGICAL FRACTURE, UNSPECIFIED OSTEOPOROSIS TYPE, SEQUELA: ICD-10-CM

## 2022-03-04 DIAGNOSIS — C54.1 ENDOMETRIAL CANCER (HCC): Primary | Chronic | ICD-10-CM

## 2022-03-04 DIAGNOSIS — D70.1 CHEMOTHERAPY INDUCED NEUTROPENIA (HCC): ICD-10-CM

## 2022-03-04 DIAGNOSIS — R64 CACHEXIA (HCC): ICD-10-CM

## 2022-03-04 DIAGNOSIS — T45.1X5A CHEMOTHERAPY INDUCED NEUTROPENIA (HCC): ICD-10-CM

## 2022-03-04 DIAGNOSIS — K59.03 DRUG INDUCED CONSTIPATION: ICD-10-CM

## 2022-03-04 DIAGNOSIS — G89.3 CANCER RELATED PAIN: ICD-10-CM

## 2022-03-04 DIAGNOSIS — N13.5 OBSTRUCTION OF RIGHT URETER: ICD-10-CM

## 2022-03-04 DIAGNOSIS — C54.1 ENDOMETRIAL CANCER (HCC): Primary | ICD-10-CM

## 2022-03-04 DIAGNOSIS — E83.42 HYPOMAGNESEMIA: ICD-10-CM

## 2022-03-04 LAB
ALBUMIN SERPL BCP-MCNC: 3.6 G/DL (ref 3.5–5)
ALP SERPL-CCNC: 117 U/L (ref 46–116)
ALT SERPL W P-5'-P-CCNC: 37 U/L (ref 12–78)
AMYLASE SERPL-CCNC: 40 IU/L (ref 25–115)
ANION GAP SERPL CALCULATED.3IONS-SCNC: 13 MMOL/L (ref 4–13)
APTT PPP: 46 SECONDS (ref 23–37)
AST SERPL W P-5'-P-CCNC: 41 U/L (ref 5–45)
BASOPHILS # BLD AUTO: 0.04 THOUSANDS/ΜL (ref 0–0.1)
BASOPHILS NFR BLD AUTO: 1 % (ref 0–1)
BILIRUB SERPL-MCNC: 0.29 MG/DL (ref 0.2–1)
BUN SERPL-MCNC: 18 MG/DL (ref 5–25)
CALCIUM SERPL-MCNC: 9.8 MG/DL (ref 8.3–10.1)
CHLORIDE SERPL-SCNC: 103 MMOL/L (ref 100–108)
CHOLEST SERPL-MCNC: 181 MG/DL
CO2 SERPL-SCNC: 21 MMOL/L (ref 21–32)
CREAT SERPL-MCNC: 0.99 MG/DL (ref 0.6–1.3)
EOSINOPHIL # BLD AUTO: 0.02 THOUSAND/ΜL (ref 0–0.61)
EOSINOPHIL NFR BLD AUTO: 0 % (ref 0–6)
ERYTHROCYTE [DISTWIDTH] IN BLOOD BY AUTOMATED COUNT: 14.7 % (ref 11.6–15.1)
GFR SERPL CREATININE-BSD FRML MDRD: 60 ML/MIN/1.73SQ M
GGT SERPL-CCNC: 13 U/L (ref 5–85)
GLUCOSE P FAST SERPL-MCNC: 86 MG/DL (ref 65–99)
GLUCOSE SERPL-MCNC: 86 MG/DL (ref 65–140)
HCT VFR BLD AUTO: 31.4 % (ref 34.8–46.1)
HGB BLD-MCNC: 10.1 G/DL (ref 11.5–15.4)
IMM GRANULOCYTES # BLD AUTO: 0.05 THOUSAND/UL (ref 0–0.2)
IMM GRANULOCYTES NFR BLD AUTO: 1 % (ref 0–2)
INR PPP: 1.09 (ref 0.84–1.19)
LIPASE SERPL-CCNC: 121 U/L (ref 73–393)
LYMPHOCYTES # BLD AUTO: 0.68 THOUSANDS/ΜL (ref 0.6–4.47)
LYMPHOCYTES NFR BLD AUTO: 14 % (ref 14–44)
MAGNESIUM SERPL-MCNC: 1.9 MG/DL (ref 1.6–2.6)
MCH RBC QN AUTO: 32.1 PG (ref 26.8–34.3)
MCHC RBC AUTO-ENTMCNC: 32.2 G/DL (ref 31.4–37.4)
MCV RBC AUTO: 100 FL (ref 82–98)
MONOCYTES # BLD AUTO: 0.52 THOUSAND/ΜL (ref 0.17–1.22)
MONOCYTES NFR BLD AUTO: 11 % (ref 4–12)
NEUTROPHILS # BLD AUTO: 3.49 THOUSANDS/ΜL (ref 1.85–7.62)
NEUTS SEG NFR BLD AUTO: 73 % (ref 43–75)
NRBC BLD AUTO-RTO: 0 /100 WBCS
PHOSPHATE SERPL-MCNC: 3.5 MG/DL (ref 2.3–4.1)
PLATELET # BLD AUTO: 249 THOUSANDS/UL (ref 149–390)
PMV BLD AUTO: 11 FL (ref 8.9–12.7)
POTASSIUM SERPL-SCNC: 4.2 MMOL/L (ref 3.5–5.3)
PROT SERPL-MCNC: 8.1 G/DL (ref 6.4–8.2)
PROTHROMBIN TIME: 14.1 SECONDS (ref 11.6–14.5)
RBC # BLD AUTO: 3.15 MILLION/UL (ref 3.81–5.12)
SODIUM SERPL-SCNC: 137 MMOL/L (ref 136–145)
T3 SERPL-MCNC: 1 NG/ML (ref 0.6–1.8)
T4 FREE SERPL-MCNC: 1.28 NG/DL (ref 0.76–1.46)
TSH SERPL DL<=0.05 MIU/L-ACNC: 1.79 UIU/ML (ref 0.36–3.74)
WBC # BLD AUTO: 4.8 THOUSAND/UL (ref 4.31–10.16)

## 2022-03-04 PROCEDURE — 84100 ASSAY OF PHOSPHORUS: CPT

## 2022-03-04 PROCEDURE — 83690 ASSAY OF LIPASE: CPT

## 2022-03-04 PROCEDURE — 85610 PROTHROMBIN TIME: CPT

## 2022-03-04 PROCEDURE — 82465 ASSAY BLD/SERUM CHOLESTEROL: CPT

## 2022-03-04 PROCEDURE — 85730 THROMBOPLASTIN TIME PARTIAL: CPT

## 2022-03-04 PROCEDURE — 82977 ASSAY OF GGT: CPT

## 2022-03-04 PROCEDURE — 99214 OFFICE O/P EST MOD 30 MIN: CPT | Performed by: OBSTETRICS & GYNECOLOGY

## 2022-03-04 PROCEDURE — 82150 ASSAY OF AMYLASE: CPT

## 2022-03-04 PROCEDURE — 84439 ASSAY OF FREE THYROXINE: CPT

## 2022-03-04 PROCEDURE — 85025 COMPLETE CBC W/AUTO DIFF WBC: CPT

## 2022-03-04 PROCEDURE — 84443 ASSAY THYROID STIM HORMONE: CPT

## 2022-03-04 PROCEDURE — 83735 ASSAY OF MAGNESIUM: CPT

## 2022-03-04 PROCEDURE — 84480 ASSAY TRIIODOTHYRONINE (T3): CPT

## 2022-03-04 PROCEDURE — 80053 COMPREHEN METABOLIC PANEL: CPT

## 2022-03-04 RX ORDER — SODIUM CHLORIDE 9 MG/ML
20 INJECTION, SOLUTION INTRAVENOUS CONTINUOUS
Status: DISCONTINUED | OUTPATIENT
Start: 2022-03-07 | End: 2022-03-10 | Stop reason: HOSPADM

## 2022-03-04 NOTE — PROGRESS NOTES
Pt here for central labs for clinical trials  Pt offers no complaints  Labs drawn and sent to the lab  Port flushed per protocol  Aware of next appointments-AVS declined  Ignacio Perrin

## 2022-03-05 ENCOUNTER — APPOINTMENT (OUTPATIENT)
Dept: LAB | Facility: CLINIC | Age: 67
End: 2022-03-05
Payer: COMMERCIAL

## 2022-03-05 LAB
BACTERIA UR QL AUTO: ABNORMAL /HPF
BILIRUB UR QL STRIP: NEGATIVE
CLARITY UR: CLEAR
COLOR UR: YELLOW
CREAT UR-MCNC: <13 MG/DL
GLUCOSE UR STRIP-MCNC: NEGATIVE MG/DL
HGB UR QL STRIP.AUTO: ABNORMAL
KETONES UR STRIP-MCNC: NEGATIVE MG/DL
LEUKOCYTE ESTERASE UR QL STRIP: ABNORMAL
NITRITE UR QL STRIP: POSITIVE
NON-SQ EPI CELLS URNS QL MICRO: ABNORMAL /HPF
PH UR STRIP.AUTO: 5.5 [PH]
PROT UR STRIP-MCNC: NEGATIVE MG/DL
PROT UR-MCNC: 10 MG/DL
PROT/CREAT UR: >0.77 MG/G{CREAT} (ref 0–0.1)
RBC #/AREA URNS AUTO: ABNORMAL /HPF
SP GR UR STRIP.AUTO: <=1.005 (ref 1–1.03)
UROBILINOGEN UR QL STRIP.AUTO: 0.2 E.U./DL
WBC #/AREA URNS AUTO: ABNORMAL /HPF

## 2022-03-05 PROCEDURE — 81001 URINALYSIS AUTO W/SCOPE: CPT

## 2022-03-05 PROCEDURE — 84156 ASSAY OF PROTEIN URINE: CPT

## 2022-03-05 PROCEDURE — 82570 ASSAY OF URINE CREATININE: CPT

## 2022-03-07 ENCOUNTER — DOCUMENTATION (OUTPATIENT)
Dept: OTHER | Facility: HOSPITAL | Age: 67
End: 2022-03-07

## 2022-03-07 ENCOUNTER — NUTRITION (OUTPATIENT)
Dept: NUTRITION | Facility: CLINIC | Age: 67
End: 2022-03-07

## 2022-03-07 ENCOUNTER — HOSPITAL ENCOUNTER (OUTPATIENT)
Dept: INFUSION CENTER | Facility: CLINIC | Age: 67
Discharge: HOME | End: 2022-03-07
Payer: COMMERCIAL

## 2022-03-07 VITALS
WEIGHT: 124 LBS | HEIGHT: 59 IN | BODY MASS INDEX: 25 KG/M2 | HEART RATE: 70 BPM | OXYGEN SATURATION: 99 % | SYSTOLIC BLOOD PRESSURE: 126 MMHG | TEMPERATURE: 96.9 F | RESPIRATION RATE: 18 BRPM | DIASTOLIC BLOOD PRESSURE: 80 MMHG

## 2022-03-07 DIAGNOSIS — Z71.3 NUTRITIONAL COUNSELING: Primary | ICD-10-CM

## 2022-03-07 PROCEDURE — J9999Q0 INV ATEZOLIZUMAB 1200MG/20ML 20 ML: Performed by: OBSTETRICS & GYNECOLOGY

## 2022-03-07 PROCEDURE — 96413 CHEMO IV INFUSION 1 HR: CPT

## 2022-03-07 PROCEDURE — J9035Q0 INV BEVACIZUMAB 400MG/16ML 16 ML: Performed by: OBSTETRICS & GYNECOLOGY

## 2022-03-07 PROCEDURE — 96417 CHEMO IV INFUS EACH ADDL SEQ: CPT

## 2022-03-07 RX ADMIN — SODIUM CHLORIDE 20 ML/HR: 9 INJECTION, SOLUTION INTRAVENOUS at 08:15

## 2022-03-07 RX ADMIN — Medication 878 MG: at 09:28

## 2022-03-07 RX ADMIN — Medication 1200 MG: at 08:50

## 2022-03-07 NOTE — PROGRESS NOTES
Labs have been reviewed and signed by Dr Ozzie Gallegos   Per protocol, patient is ok to proceed with Cycle 22 Day 1 treatment on Monday  3/07/22

## 2022-03-07 NOTE — PROGRESS NOTES
Patient was seen in [facility] [address] [facility]  Patient was given Cycle 22 Rucaparib pills and drug diary, and patient returned Cycle 21 study pills, and diary   Patient denied any changes to AE'S and conmeds since office visit on 3/04/22   She states that she is feeling much better and her mouth sores have started to clear and her appetite is up   Patient was told to call with any questions or concerns, Patient tolerates treatment without incident

## 2022-03-07 NOTE — PROGRESS NOTES
Pt resting with no complaints  Vitals stable; labs drawn and reviewed prior to tx  Callbell within reach; will continue to monitor

## 2022-03-07 NOTE — PATIENT INSTRUCTIONS
Nutrition Rx & Recommendations:  · Diet: High Calorie, High Protein (for high calorie foods see pages 52-53, and for high protein foods see pages 49-51 in your Eating Hints book)  · Small, frequent meals/snacks may be easier to tolerate than 3 large daily meals  Aim for 5-6 small meals per day (every 2-3 hours)  · Include protein at all meals/snacks  · Avoid spicy, acidic, sharp/hard/crunchy foods & carbonated beverages as needed  · Follow proper oral care; Try baking soda/salt water rinse recipe (mix 3/4 tsp salt + 1 tsp baking soda + 1 qt water; rinse with plain water after using) in Eating Hints book (pg 18)  Brush your teeth before/after meals & before bed  · For appetite loss: try powdered or liquid nutrition supplements; eating by the clock every 2-3 hours; set a timer to remind yourself to eat, keep snacks nearby; add extra protein & calories to your diet; drink liquids in between meals, choose liquids that add calories; eat a bedtime snack; eat soft, cool or frozen foods; eat a larger meal when you feel well & rested; only sip small amounts of liquids during meals (see pages 10-12 in your Eating Hints book)  · For weight loss: monitor your weight at home at least 2x/week, record your weight, start by adding 250-500 extra calories per day, eat 5-6 small scheduled meals every 2-3 hrs, choose foods that are high in protein and calories (see pages 49-53 in your Eating Hints book), drink liquids with calories for example: milkshakes/smoothies/juice/soup/whole milk/chocolate milk, cook with protein fortified milk (see recipe on page 36 in your Eating Hints book), consider ready-to-drink oral nutrition supplements such as Ensure Plus, Ensure Enlive, Boost Plus, or Boost Very High Calorie, avoid "diet" and "light" foods when possible, avoid drinking too much with meals, contact your dietitian with any continued weight loss over the course of 1 week    For more info see pages 35-37 in your Eating Hints book   · For constipation: drink plenty of fluids (>64 oz/day); drink hot liquids; eat high-fiber foods as tolerated (whole grains, beans, peas, nuts, seeds, fruits, vegetables, etc); increase physical activity as tolerated  Avoid increasing fiber intake too quickly, add fiber into your diet slowly; keep a record of your bowel movements (see pages 13-14 in you Eating Hints book)  · For sore mouth/throat: choose foods that are easy to chew/swallow; cook foods until they are soft/tender; moisten & soften foods (gravy/sauce/broth/yogurt); cut food into small pieces; drink with a straw (as tolerated); eat with a very small spoon; eat cold or room temperature food; suck on ice chips; Avoid citrus, spicy foods, tomatoes/ketchup, salty foods, raw vegetables, sharp/crunchy/hard foods & alcohol (see pages 23-28 in your Eating Hints book)  · For dry mouth: sip water throughout the day; try very sweet (or tart, as tolerated) drinks; chew gum or suck on hard candy, popsicles, & ice chips; eat easy to swallow foods (such as pureed cooked foods or soups); moisten food with sauce/gravy/salad dressing; do not drink beer, wine, or any type of alcohol; keep lips moist with lip balm; avoid tobacco products & second-hand smoke; try Biotene as needed (see pages 17-18 in your Eating Hints book)  Follow proper oral care; Try baking soda/salt water rinse recipe (mix 3/4 tsp salt + 1 tsp baking soda + 1 qt water; rinse with pain water after using) in Eating Hints book (pg 18)  Brush your teeth before/after meals & before bed  · Weigh yourself regularly  If you notice weight loss, make an effort to increase your daily food/calorie intake  If you continue to notice loss after these efforts, reach out to your dietitian to establish a plan to stabilize weight     · Snack ideas: [place] yogurt with fruit, pudding, veggies with humus OR try greek yogurt based vegetable dip, peanut butter, grapes and cheese, fruit with a cheese stick , cottage cheese, soup, unjury, protein shakes/smoothies  · Continue to write down food intake each day  · Re-start apple prune sauce for constipation  Patient choosen goals:  · Nutrition Supplements:  Try to be more consistent with your Cirilo Blocker and aim for 2 svg/day; continue CIB mixed with Fairlife milk daily  · Try to increase fruit and vegetable intake at snacks  · Be more consistent with snacks      Follow Up Plan: 3/28/22 during infusion   Recommend Referral to Other Providers: none at this time

## 2022-03-08 ENCOUNTER — HOSPITAL ENCOUNTER (OUTPATIENT)
Dept: RADIOLOGY | Facility: HOSPITAL | Age: 67
Discharge: HOME | End: 2022-03-08
Attending: RADIOLOGY | Admitting: RADIOLOGY
Payer: COMMERCIAL

## 2022-03-08 ENCOUNTER — PREP FOR PROCEDURE (OUTPATIENT)
Dept: INTERVENTIONAL RADIOLOGY/VASCULAR | Facility: CLINIC | Age: 67
End: 2022-03-08

## 2022-03-08 VITALS
TEMPERATURE: 98.3 F | SYSTOLIC BLOOD PRESSURE: 131 MMHG | HEART RATE: 79 BPM | DIASTOLIC BLOOD PRESSURE: 66 MMHG | BODY MASS INDEX: 25.2 KG/M2 | RESPIRATION RATE: 17 BRPM | WEIGHT: 125 LBS | HEIGHT: 59 IN | OXYGEN SATURATION: 98 %

## 2022-03-08 DIAGNOSIS — N13.5 OBSTRUCTION OF RIGHT URETER: Primary | ICD-10-CM

## 2022-03-08 PROCEDURE — 50387 CHANGE NEPHROURETERAL CATH: CPT

## 2022-03-08 PROCEDURE — C1769 GUIDE WIRE: HCPCS

## 2022-03-08 PROCEDURE — 99153 MOD SED SAME PHYS/QHP EA: CPT

## 2022-03-08 PROCEDURE — 99152 MOD SED SAME PHYS/QHP 5/>YRS: CPT

## 2022-03-08 PROCEDURE — 50387 CHANGE NEPHROURETERAL CATH: CPT | Performed by: RADIOLOGY

## 2022-03-08 PROCEDURE — C2625 STENT, NON-COR, TEM W/DEL SY: HCPCS

## 2022-03-08 RX ORDER — SODIUM CHLORIDE 9 MG/ML
75 INJECTION, SOLUTION INTRAVENOUS CONTINUOUS
Status: CANCELLED | OUTPATIENT
Start: 2022-03-08

## 2022-03-08 RX ORDER — SODIUM CHLORIDE 9 MG/ML
75 INJECTION, SOLUTION INTRAVENOUS CONTINUOUS
Status: DISCONTINUED | OUTPATIENT
Start: 2022-03-08 | End: 2022-03-09 | Stop reason: HOSPADM

## 2022-03-08 RX ORDER — FENTANYL CITRATE 50 UG/ML
25 INJECTION, SOLUTION INTRAMUSCULAR; INTRAVENOUS EVERY 2 HOUR PRN
Status: DISCONTINUED | OUTPATIENT
Start: 2022-03-08 | End: 2022-03-09 | Stop reason: HOSPADM

## 2022-03-08 RX ORDER — LIDOCAINE WITH 8.4% SOD BICARB 0.9%(10ML)
SYRINGE (ML) INJECTION CODE/TRAUMA/SEDATION MEDICATION
Status: COMPLETED | OUTPATIENT
Start: 2022-03-08 | End: 2022-03-08

## 2022-03-08 RX ORDER — FENTANYL CITRATE 50 UG/ML
INJECTION, SOLUTION INTRAMUSCULAR; INTRAVENOUS CODE/TRAUMA/SEDATION MEDICATION
Status: COMPLETED | OUTPATIENT
Start: 2022-03-08 | End: 2022-03-08

## 2022-03-08 RX ORDER — MIDAZOLAM HYDROCHLORIDE 2 MG/2ML
INJECTION, SOLUTION INTRAMUSCULAR; INTRAVENOUS CODE/TRAUMA/SEDATION MEDICATION
Status: COMPLETED | OUTPATIENT
Start: 2022-03-08 | End: 2022-03-08

## 2022-03-08 RX ORDER — ACETAMINOPHEN 325 MG/1
650 TABLET ORAL EVERY 4 HOURS PRN
Status: DISCONTINUED | OUTPATIENT
Start: 2022-03-08 | End: 2022-03-09 | Stop reason: HOSPADM

## 2022-03-08 RX ORDER — CIPROFLOXACIN 500 MG/1
500 TABLET, FILM COATED ORAL ONCE
Qty: 1 TABLET | Refills: 0 | Status: SHIPPED | OUTPATIENT
Start: 2022-03-08 | End: 2022-03-08

## 2022-03-08 RX ADMIN — FENTANYL CITRATE 50 MCG: 50 INJECTION INTRAMUSCULAR; INTRAVENOUS at 09:09

## 2022-03-08 RX ADMIN — MIDAZOLAM 1 MG: 1 INJECTION INTRAMUSCULAR; INTRAVENOUS at 09:09

## 2022-03-08 RX ADMIN — IOHEXOL 20 ML: 350 INJECTION, SOLUTION INTRAVENOUS at 09:29

## 2022-03-08 RX ADMIN — Medication 10 ML: at 09:19

## 2022-03-08 RX ADMIN — SODIUM CHLORIDE 75 ML/HR: 0.9 INJECTION, SOLUTION INTRAVENOUS at 08:47

## 2022-03-08 RX ADMIN — FENTANYL CITRATE 50 MCG: 50 INJECTION INTRAMUSCULAR; INTRAVENOUS at 09:24

## 2022-03-08 NOTE — SEDATION DOCUMENTATION
Right PCNU exchange performed by Dr Shanda Lisa  Procedure tolerated well, VSS  IR Procedure Bedrest Start Time is 0930

## 2022-03-08 NOTE — DISCHARGE INSTRUCTIONS
TUBE CARE INSTRUCTIONS    Care after your procedure:    Resume your normal diet  Small sips of flat soda will help with nausea  1  The properly functioning catheter should be forward flushed once (1x) daily with 10ml of normal saline using clean technique  You will be given a prescription for flushes  To flush the tube, clean both connections with alcohol swab  Twist off the drainage bag/ bulb  tubing and twist the saline syringe into the drainage tube and flush  Remove the syringe and twist the drainage bag / bulb tubing tubing back on     2  The drainage bag/bulb may be emptied as necessary  Keep a record of the amount of fluid you drain from your tube  This should be done with clean technique as well  3  A fresh dressing should be applied daily over the tube insertion site  4  As the tube is secured to the skin with only a suture,try not to pull on your tube  Tub baths are not permitted  Showers are permitted if the patient's skin entry site is prevented from getting wet  Similarly, washcloth "baths" are acceptable  Contact Interventional Radiology at [phone] Angelique PATIENTS: Contact Interventional Radiology at [phone]) Ania Rosa PATIENTS: Contact Interventional Radiology at [phone]) if:    1  Leakage or large amounts of liquid around the catheter  2  Fever of 101 degrees lasting several hours without other obvious cause (such as sore throat, flu, etc)  3  Persistent nausea or vomiting  4  Diminished drainage, which may be associated with pressure or pain  Or when the     drainage from your tube is less than 10mls for 48 hours  5  Catheter pulled back or falls out  The following pharmacies carry the flush syringes         [facility]                     [facility]  [address]                         [address] [facility]  Phone [phone]            Phone [phone]   [place] [address]                                [phone]  [address] [facility] Syl SANCHEZ                      [address] [facility]  Phone [phone]            Phone [phone]                      [facility]                                                                                                          [phone]  [facility]  [facility] [address]    [address]  Phone [phone]

## 2022-03-16 ENCOUNTER — HOSPITAL ENCOUNTER (OUTPATIENT)
Dept: NON INVASIVE DIAGNOSTICS | Facility: HOSPITAL | Age: 67
Discharge: HOME | End: 2022-03-16
Attending: OBSTETRICS & GYNECOLOGY
Payer: COMMERCIAL

## 2022-03-16 VITALS
WEIGHT: 125 LBS | SYSTOLIC BLOOD PRESSURE: 131 MMHG | HEART RATE: 65 BPM | DIASTOLIC BLOOD PRESSURE: 66 MMHG | BODY MASS INDEX: 25.2 KG/M2 | HEIGHT: 59 IN

## 2022-03-16 DIAGNOSIS — C54.1 ENDOMETRIAL CANCER (HCC): ICD-10-CM

## 2022-03-16 LAB
APICAL FOUR CHAMBER EJECTION FRACTION: 61 %
ASCENDING AORTA: 3.6 CM (ref 1.79–2.67)
E WAVE DECELERATION TIME: 245 MS
GLOBAL LONGITUIDAL STRAIN: -23 %
MV E'TISSUE VEL-SEP: 6 CM/S
MV PEAK A VEL: 1.03 M/S
MV PEAK E VEL: 52 CM/S
MV STENOSIS PRESSURE HALF TIME: 71 MS
MV VALVE AREA P 1/2 METHOD: 3.1 CM2
SL CV LV EF: 65
TR MAX PG: 27 MMHG
TR PEAK VELOCITY: 2.6 M/S
TRICUSPID VALVE PEAK REGURGITATION VELOCITY: 2.59 M/S
Z-SCORE OF ASCENDING AORTA: 6.12

## 2022-03-16 PROCEDURE — 93325 DOPPLER ECHO COLOR FLOW MAPG: CPT

## 2022-03-16 PROCEDURE — 93356 MYOCRD STRAIN IMG SPCKL TRCK: CPT | Performed by: INTERNAL MEDICINE

## 2022-03-16 PROCEDURE — 93325 DOPPLER ECHO COLOR FLOW MAPG: CPT | Performed by: INTERNAL MEDICINE

## 2022-03-16 PROCEDURE — 93321 DOPPLER ECHO F-UP/LMTD STD: CPT | Performed by: INTERNAL MEDICINE

## 2022-03-16 PROCEDURE — 93321 DOPPLER ECHO F-UP/LMTD STD: CPT

## 2022-03-16 PROCEDURE — 93308 TTE F-UP OR LMTD: CPT | Performed by: INTERNAL MEDICINE

## 2022-03-16 PROCEDURE — 93308 TTE F-UP OR LMTD: CPT

## 2022-03-17 DIAGNOSIS — C54.1 ENDOMETRIAL CANCER (HCC): Primary | ICD-10-CM

## 2022-03-18 ENCOUNTER — OFFICE VISIT (OUTPATIENT)
Dept: LAB | Facility: HOSPITAL | Age: 67
End: 2022-03-18
Attending: OBSTETRICS & GYNECOLOGY
Payer: COMMERCIAL

## 2022-03-18 ENCOUNTER — OFFICE VISIT (OUTPATIENT)
Dept: PALLIATIVE MEDICINE | Facility: CLINIC | Age: 67
End: 2022-03-18
Payer: COMMERCIAL

## 2022-03-18 VITALS
RESPIRATION RATE: 18 BRPM | OXYGEN SATURATION: 99 % | DIASTOLIC BLOOD PRESSURE: 66 MMHG | TEMPERATURE: 97.9 F | HEART RATE: 77 BPM | WEIGHT: 125 LBS | SYSTOLIC BLOOD PRESSURE: 120 MMHG | BODY MASS INDEX: 25.25 KG/M2

## 2022-03-18 DIAGNOSIS — R11.0 CHEMOTHERAPY-INDUCED NAUSEA: ICD-10-CM

## 2022-03-18 DIAGNOSIS — K59.03 DRUG INDUCED CONSTIPATION: Primary | ICD-10-CM

## 2022-03-18 DIAGNOSIS — R64 CACHEXIA (HCC): ICD-10-CM

## 2022-03-18 DIAGNOSIS — C54.1 ENDOMETRIAL CANCER (HCC): ICD-10-CM

## 2022-03-18 DIAGNOSIS — G89.3 CANCER RELATED PAIN: ICD-10-CM

## 2022-03-18 DIAGNOSIS — T45.1X5A CHEMOTHERAPY-INDUCED NAUSEA: ICD-10-CM

## 2022-03-18 DIAGNOSIS — C54.1 ENDOMETRIAL CANCER (HCC): Chronic | ICD-10-CM

## 2022-03-18 DIAGNOSIS — C79.89 SECONDARY MALIGNANT NEOPLASM OF OTHER SPECIFIED SITES (HCC): ICD-10-CM

## 2022-03-18 DIAGNOSIS — E44.0 MODERATE PROTEIN-CALORIE MALNUTRITION (HCC): ICD-10-CM

## 2022-03-18 LAB
ATRIAL RATE: 75 BPM
P AXIS: 60 DEGREES
PR INTERVAL: 150 MS
QRS AXIS: -35 DEGREES
QRSD INTERVAL: 80 MS
QT INTERVAL: 380 MS
QTC INTERVAL: 424 MS
T WAVE AXIS: 40 DEGREES
VENTRICULAR RATE: 75 BPM

## 2022-03-18 PROCEDURE — 93005 ELECTROCARDIOGRAM TRACING: CPT

## 2022-03-18 PROCEDURE — 99214 OFFICE O/P EST MOD 30 MIN: CPT | Performed by: INTERNAL MEDICINE

## 2022-03-18 PROCEDURE — 3074F SYST BP LT 130 MM HG: CPT | Performed by: INTERNAL MEDICINE

## 2022-03-18 PROCEDURE — 3078F DIAST BP <80 MM HG: CPT | Performed by: INTERNAL MEDICINE

## 2022-03-18 PROCEDURE — 93010 ELECTROCARDIOGRAM REPORT: CPT | Performed by: INTERNAL MEDICINE

## 2022-03-18 PROCEDURE — 1036F TOBACCO NON-USER: CPT | Performed by: INTERNAL MEDICINE

## 2022-03-18 RX ORDER — OMEGA-3S/DHA/EPA/FISH OIL 1000-1400
1 CAPSULE,DELAYED RELEASE (ENTERIC COATED) ORAL DAILY
Qty: 90 TABLET | Refills: 1 | Status: SHIPPED | OUTPATIENT
Start: 2022-03-18 | End: 2022-06-09 | Stop reason: ALTCHOICE

## 2022-03-18 RX ORDER — LACTULOSE 10 G/15ML
SOLUTION ORAL
Qty: 300 ML | Refills: 2 | Status: SHIPPED | OUTPATIENT
Start: 2022-03-18 | End: 2022-05-31

## 2022-03-18 NOTE — PROGRESS NOTES
Outpatient Follow-Up - Palliative and Supportive Care   Ragini Melendez 61 y o  female [id number]    Assessment & Plan  Problem List Items Addressed This Visit        Digestive    Drug induced constipation - Primary    Relevant Medications    lactulose (CHRONULAC) 10 g/15 mL solution    Fiber Adult Gummies 2 g CHEW   Family memberitations: {Palliative Care Visit Info; History limitation:17061}    Contacts:{Palliative Care Visit Info; Contacts:06416}    History of Present Illness      Eliezer Melendez is a 61 y o  female who presents in follow up of symptoms related to endometrial cancer  Pertinent issues include: symptom management, nausea, anorexia      Patient is a 62 yo F who presents to the clinic for routine follow up  Patinet    Past medical, surgical, social, and family histories are reviewed and pertinent updates are made  ROS      Vital Signs    /66 (BP Location: Left arm, Cuff Size: Standard)   Pulse 77   Temp 97 9 °F (36 6 °C) (Temporal)   Resp 18   Wt 56 7 kg (125 lb)   LMP  (LMP Unknown)   SpO2 99%   BMI 25 25 kg/m²     Physical Exam and Objective Data  Physical Exam      Radiology and Laboratory:  I personally reviewed and interpreted the following results: ***    30 minutes was spent face to face with {Patient and family:90925} with greater than 50% of the time spent in counseling or coordination of care including discussions of instructions for disease self management, treatment instructions, follow up requirements, risk factors and risk reduction of disease and patient and family counseling/involvement in care  All of the patient's or agent's questions were answered during this discussion

## 2022-03-18 NOTE — PROGRESS NOTES
Outpatient Follow-Up - Palliative and Supportive Care   Ragini Melendez 61 y o  female [id number]    Assessment & Plan  Problem List Items Addressed This Visit        Digestive    Drug induced constipation - Primary    Relevant Medications    lactulose (CHRONULAC) 10 g/15 mL solution    Fiber Adult Gummies 2 g CHEW       Genitourinary    Endometrial cancer (HCC) (Chronic)       Other    Cancer related pain    Chemotherapy-induced nausea    Moderate protein-calorie malnutrition (HCC)    Secondary malignant neoplasm of other specified sites ([facility] [address] )    [facility] )          1  Anorexia: Patient reports appetite has been good  Weight has been stable  Continue Marinol  Follow up in 2 months    2  Constipation: Chronic  Recommended to stop Miralax and change to Fiber gummies instead  Try lactulose daily or twice a day  Prescriptions sent to pharmacy  Instructed to call the office for any questions or concerns  3  SASHA: Well controlled  Using Zofran sparingly       Medications adjusted this encounter:  Requested Prescriptions     Signed Prescriptions Disp Refills    lactulose (CHRONULAC) 10 g/15 mL solution 300 mL 2     Sig: TAKE 45 ML (30 G TOTAL) BY MOUTH 2 (TWO) TIMES A DAY    Fiber Adult Gummies 2 g CHEW 90 tablet 1     Sig: Chew 1 tablet in the morning     No orders of the defined types were placed in this encounter  Medications Discontinued During This Encounter   Medication Reason    lactulose (CHRONULAC) 10 g/15 mL solution Reorder         Ragini Melendez was seen today for symptoms and planning cares related to above illnesses  I have reviewed the patient's controlled substance dispensing history in the Prescription Drug Monitoring Program in compliance with the [facility] regulations before prescribing any controlled substances  They are invited to continue to follow with us    If there are questions or concerns, please contact us through our clinic/answering service 24 hours a day, seven days a pat Jeffrey MD  [facility]  [phone]      Visit Information    Accompanied By: Family member    Source of History: Self    History Limitations: None       History of Present Illness      Bryce Blanco is a 61 y o  female who presents in follow up of symptoms related to endometrial cancer  Pertinent issues include: symptom management, nausea, constipation, anorexia      Patient is a 62 yo F who presents to the office for routine follow up  Patient reports feeling overall well  She states her appetite is good  Still has some problems with constipation  She is taking Miralax everyday and lactulose every 3-4 days when she does not have a BM  She reports nausea has also been very well controlled with Zofran every once in a while  She denies pain or any other concerns  Weight has been stable  Continues to use Marinol    Past medical, surgical, social, and family histories are reviewed and pertinent updates are made  Review of Systems   Constitutional: Negative for decreased appetite, malaise/fatigue and weight loss  Respiratory: Negative for shortness of breath  Gastrointestinal: Positive for bloating and constipation  Negative for abdominal pain, diarrhea, nausea and vomiting  Neurological: Negative for light-headedness  Psychiatric/Behavioral: The patient is not nervous/anxious  Vital Signs    /66 (BP Location: Left arm, Cuff Size: Standard)   Pulse 77   Temp 97 9 °F (36 6 °C) (Temporal)   Resp 18   Wt 56 7 kg (125 lb)   LMP  (LMP Unknown)   SpO2 99%   BMI 25 25 kg/m²     Physical Exam and Objective Data  Physical Exam  Constitutional:       General: She is not in acute distress  Eyes:      General:         Right eye: No discharge  Left eye: Discharge present  Conjunctiva/sclera: Conjunctivae normal    Cardiovascular:      Rate and Rhythm: Normal rate     Pulmonary:      Effort: Pulmonary effort is normal  No respiratory distress  Abdominal:      General: Abdomen is flat  There is no distension  Skin:     Capillary Refill: Capillary refill takes less than 2 seconds  Neurological:      Mental Status: She is alert and oriented to person, place, and time  Mental status is at baseline  Psychiatric:         Mood and Affect: Mood normal  Affect is flat  Behavior: Behavior normal  Behavior is cooperative  Radiology and Laboratory:  I personally reviewed and interpreted the following results:     Lab Results   Component Value Date    WBC 4 80 03/04/2022    HGB 10 1 (L) 03/04/2022    HCT 31 4 (L) 03/04/2022     (H) 03/04/2022     03/04/2022     Lab Results   Component Value Date    SODIUM 137 03/04/2022    K 4 2 03/04/2022     03/04/2022    CO2 21 03/04/2022    BUN 18 03/04/2022    CREATININE 0 99 03/04/2022    GLUC 86 03/04/2022    CALCIUM 9 8 03/04/2022         30 minutes was spent face to face with Ragini Melendez and her family with greater than 50% of the time spent in counseling or coordination of care including discussions of instructions for disease self management, treatment instructions, follow up requirements and risk factors and risk reduction of disease  All of the patient's or agent's questions were answered during this discussion

## 2022-03-18 NOTE — PROGRESS NOTES
Outpatient Oncology Nutrition Consult  Type of Consult: Follow Up  Care Location: [facility]   Nutrition Assessment:  Oncology Diagnosis & Treatments:   · Endometrial cancer  · S/p surgery 12/19/17  · Recurrence 7/8/19  · S/p chemotherapy (carboplatin/taxol from 7/30/19-1/6/20)  · S/p whole pelvis RT (2/4/20- 4/13/20)  · Disease progression  · ENDOBAR trial started 12/21/20    Oncology History   Endometrial cancer ([facility] [address] )   11/17/2017 Initial Diagnosis    Endometrial cancer ([facility] [address] )     11/17/2017 Biopsy    ENDOMETRIAL BIOPSY: WELL DIFFERENTIATED ENDOMETRIAL ADENOCARCINOMA (FIGO I) WITH FOCALMUCINOUS FEATURES  Part B: ENDOCERVICAL POLYP:BENIGN ENDOCERVICAL      12/19/2017 Surgery    Robotic assisted total laparoscopic hysterectomy with bilateral salpingo-oophorectomy and sentinel bilateral pelvic lymph node dissection  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 (4 4 x 3 2 cm tumor, 9 4/15 4mm invasion, NO LVSI, washings revealed atypical cellular changes)     12/19/2017 Genetic Testing    Morrison testing negative     6/28/2019 Biopsy    A  Breast, Right, US BX Right Breast 1000 4 cmfn:  - Benign breast tissue with focal histiocytic aggregate  See comment   - Negative for atypia and in-situ or invasive carcinoma  7/8/2019 Recurrence    Presented with right lower extremity DVT and CT demonstrating right pelvic sidewall mass with venous, ureteral and nerve compression causing significant neuropathic pain  Biopsy:  Lymph Node, Right pelvic lymph node x3:  - High-grade adenocarcinoma  7/30/2019 - 1/6/2020 Chemotherapy    Taxol 175 mg/m2 and carboplatin AUC 6 every 21 days  Dose was reduced to taxol 135 mg/m2 and carboplatin AUC 5  Completed 6 cycles  Treatment protracted due to multiple hospital admissions       2/24/2020 - 4/13/2020 Radiation    adjuvant external beam radiation therapy to the whole pelvis to 4500 cGy followed by boost to gross disease of an additional 2200 cGy 11/23/2020 Progression    New necrotic adenopathy in the retroperitoneum on CT      12/21/2020 -  Chemotherapy    Began chemotherapy with rucaparib, atezolizumab, and bevacizumab as per [facility] clinical trial        PMH: RNYGB (2001 at [address])    Past Medical History:   Diagnosis Date    Anemia     Chemotherapy follow-up examination     Depression     Endometrial cancer ([address] ) 12/2017    Hyperglycemia     vx type 2 dm -- last assessed 4/1/14; resolved 11/7/17    Hyperlipidemia     Hypertension     Obesity     last assessed 10/14/17; resolved 11/7/17     Past Surgical History:   Procedure Laterality Date    ABDOMINAL SURGERY      GASTRIC BYPASS    CHOLECYSTECTOMY      at the time of gastric bypass    COLONOSCOPY      CT NEEDLE BIOPSY LYMPH NODE  7/8/2019    FL GUIDED CENTRAL VENOUS ACCESS DEVICE INSERTION  11/12/2019    GASTRIC BYPASS      HYSTERECTOMY Bilateral     total abdominal with salpingo-oophorectomy    IR NEPHROSTOMY TUBE PLACEMENT  7/26/2019    IR NEPHROURETERAL STENT CHECK/CHANGE/REPOSITION  4/6/2021    IR NEPHROURETERAL STENT CHECK/CHANGE/REPOSITION  6/4/2021    IR NEPHROURETERAL STENT CHECK/CHANGE/REPOSITION  9/3/2021    IR NEPHROURETERAL STENT CHECK/CHANGE/REPOSITION  12/7/2021    IR NEPHROURETERAL STENT CHECK/CHANGE/REPOSITION  3/8/2022    IR PICC LINE  9/27/2019    IR PORT PLACEMENT  7/26/2019    IR PORT REMOVAL  9/20/2019    OOPHORECTOMY Bilateral     WI INSJ TUNNELED CTR VAD W/SUBQ PORT AGE 5 YR/> Left 11/12/2019    Procedure: INSERTION VENOUS PORT ( PORT-A-CATH) IR;  Surgeon: Rolanda Rankin DO;  [facility];  Service: Interventional Radiology    WI LAP, RADICAL HYST W/ TUBE&OV, NODE BX N/A 12/19/2017    Procedure: HYSTERECTOMY LAPAROSCOPIC TOTAL ([address]) W/ ROBOTICS; BILATERAL SALPINGOOPHERECTOMY; LYMPH NODE DISSECTION; lysis of adhesions;  Surgeon: Fariba Jaimes MD;  Location: [facility];  Service: Gynecology Oncology    WI LAP,DIAGNOSTIC ABDOMEN N/A 12/19/2017    Procedure: LAPAROSCOPY DIAGNOSTIC;  Surgeon: Brigid Vivas MD;  Location: [facility];  Service: Gynecology Oncology    TONSILLECTOMY      US GUIDED BREAST BIOPSY RIGHT COMPLETE Right 6/28/2019       Review of Medications:   Vitamins, Supplements and Herbals: yes: Hx RNYGB regimen: Vitamin D, Folic Acid, Align, calcium citrate TID, B12 1000 mcg QD, MVI BID (MVI does not have iron); Iron 65 mg QD (2 hrs seperate from calcium)    Current Outpatient Medications:     acetaminophen-codeine (TYLENOL #3) 300-30 mg per tablet, Take 1 tablet by mouth every 6 (six) hours as needed for moderate pain, Disp: 20 tablet, Rfl: 0    ARIPiprazole (ABILIFY) 10 mg tablet, Take 20 mg by mouth daily  , Disp: , Rfl:     aspirin (ECOTRIN LOW STRENGTH) 81 mg EC tablet, Take 81 mg by mouth daily, Disp: , Rfl:     Calcium-Magnesium-Vitamin D (CALCIUM 500 PO), Take by mouth daily , Disp: , Rfl:     cholecalciferol (VITAMIN D3) 1,000 units tablet, Take 1,000 Units by mouth daily, Disp: , Rfl:     Cyanocobalamin (VITAMIN B12 PO), Take by mouth daily , Disp: , Rfl:     dronabinol (MARINOL) 2 5 mg capsule, Take 1 capsule (2 5 mg total) by mouth 2 (two) times a day before meals, Disp: 60 capsule, Rfl: 0    Fiber Adult Gummies 2 g CHEW, Chew 1 tablet in the morning, Disp: 90 tablet, Rfl: 1    folic acid (FOLVITE) 1 mg tablet, Take 1 tablet (1 mg total) by mouth daily, Disp: , Rfl: 0    gemfibrozil (LOPID) 600 mg tablet, TAKE 1 TABLET BY MOUTH EVERY DAY, Disp: 90 tablet, Rfl: 1    lactulose (CHRONULAC) 10 g/15 mL solution, TAKE 45 ML (30 G TOTAL) BY MOUTH 2 (TWO) TIMES A DAY, Disp: 300 mL, Rfl: 2    LORazepam (ATIVAN) 0 5 mg tablet, Take 1 tablet (0 5 mg total) by mouth every 6 (six) hours as needed for anxiety (or nausea), Disp: 36 tablet, Rfl: 1    Multiple Vitamin (MULTIVITAMIN) tablet, Take 1 tablet by mouth daily, Disp: , Rfl:     naloxone (NARCAN) 4 mg/0 1 mL nasal spray, Administer 1 spray into a nostril   If no response after 2-3 minutes, give another dose in the other nostril using a new spray , Disp: 1 each, Rfl: 0    omeprazole (PriLOSEC) 20 mg delayed release capsule, Take 20 mg by mouth daily, Disp: , Rfl:     ondansetron (ZOFRAN) 8 mg tablet, Take 1 tablet (8 mg total) by mouth every 8 (eight) hours as needed for nausea or vomiting, Disp: 30 tablet, Rfl: 1    oxybutynin (DITROPAN XL) 15 MG 24 hr tablet, TAKE 1 TABLET BY MOUTH DAILY AT BEDTIME, Disp: 90 tablet, Rfl: 3    phenazopyridine (PYRIDIUM) 200 mg tablet, Take 1 tablet (200 mg total) by mouth daily as needed for bladder spasms, Disp: 30 tablet, Rfl: 0    quinapril (ACCUPRIL) 5 mg tablet, TAKE 1 TABLET BY MOUTH EVERYDAY AT BEDTIME, Disp: 90 tablet, Rfl: 0    rucaparib (RUBRACA) 300 mg tablet, Take 600 mg by mouth every 12 (twelve) hours Take with or without food  Do not repeat a vomited dose , Disp: , Rfl:     saccharomyces boulardii (FLORASTOR) 250 mg capsule, Take 1 capsule (250 mg total) by mouth 2 (two) times a day, Disp: , Rfl: 0    Sodium Chloride Flush (Normal Saline Flush) 0 9 % SOLN,  , Disp: , Rfl:     venlafaxine (EFFEXOR) 75 mg tablet, Take 75 mg by mouth 3 (three) times a day  , Disp: , Rfl:   No current facility-administered medications for this visit      Facility-Administered Medications Ordered in Other Visits:     INV atezolizumab (INVESTIGATIONAL) 1,200 mg in sodium chloride 0 9 % 250 mL infusion, 1,200 mg, Intravenous, Once, Luli Busby PA-C    INV bevacizumab (INVESTIGATIONAL) 878 mg in sodium chloride 0 9 % 64 88 mL IVPB, 878 mg, Intravenous, Once, Luli Busby PA-C    sodium chloride 0 9 % infusion, 20 mL/hr, Intravenous, Continuous, Luli Busby PA-C, Last Rate: 20 mL/hr at 03/28/22 0815, 20 mL/hr at 03/28/22 0815    Most Recent Lab Results:  Lab Results   Component Value Date    WBC 4 35 03/25/2022    IRON 92 11/19/2021    TIBC 374 11/19/2021    FERRITIN 1,429 (H) 11/19/2021    CHOLESTEROL 177 03/25/2022 CHOL 183 10/27/2017    TRIG 88 11/01/2018    HDL 59 11/01/2018    LDLCALC 116 (H) 11/01/2018    ALT 35 03/25/2022    AST 34 03/25/2022    ALB 3 4 (L) 03/25/2022     10/27/2017     05/12/2017    SODIUM 135 (L) 03/25/2022    SODIUM 137 03/04/2022    K 3 8 03/25/2022    K 4 2 03/04/2022     03/25/2022    BUN 12 03/25/2022    BUN 18 03/04/2022    CREATININE 1 04 03/25/2022    CREATININE 0 99 03/04/2022    EGFR 56 03/25/2022    PHOS 3 4 03/25/2022    PHOS 3 5 03/04/2022    GLUCOSE 219 (H) 12/19/2017    POCGLU 97 08/18/2020    GLUF 86 03/04/2022    GLUF 91 02/11/2022    GLUC 89 03/25/2022    HGBA1C 4 8 07/07/2021    HGBA1C 5 1 02/03/2020    HGBA1C 6 2 08/28/2019    CALCIUM 9 5 03/25/2022    FOLATE 5 4 10/06/2019    MG 1 9 03/25/2022     Anthropometric Measurements:   Height: 59"  Ht Readings from Last 1 Encounters:   03/28/22 4' 11" (1 499 m)     Wt Readings from Last 20 Encounters:   03/28/22 54 kg (119 lb)   03/25/22 54 6 kg (120 lb 5 9 oz)   03/18/22 56 7 kg (125 lb)   03/16/22 56 7 kg (125 lb)   03/08/22 56 7 kg (125 lb)   03/07/22 56 2 kg (124 lb)   03/04/22 55 1 kg (121 lb 8 oz)   02/24/22 56 kg (123 lb 6 4 oz)   02/17/22 58 5 kg (129 lb)   02/14/22 57 2 kg (126 lb)   02/11/22 58 5 kg (129 lb)   01/25/22 61 5 kg (135 lb 9 6 oz)   01/24/22 60 3 kg (133 lb)   01/21/22 61 2 kg (134 lb 14 7 oz)   01/21/22 61 2 kg (135 lb)   01/11/22 60 3 kg (133 lb)   01/03/22 61 kg (134 lb 8 oz)   12/30/21 61 2 kg (135 lb)   12/13/21 63 7 kg (140 lb 8 oz)   12/10/21 64 9 kg (143 lb)     Weight Hx:  · Usual Weight: 270# before RNYGB in 2001; lowest post-op wt: 200#; wt typically fluctuates between 215-230# (prior to wt loss)  · Varian: (2/25/20) 185 6#, (3/3/20) 188 6#, (3/10/20) 187 2#, (3/19/20) 186 2#, (3/24/20) 187#, (3/31/20) 185 4#, (4/10/20) 184  4#   · Home Scale Wts: (2/19/20) 185#, (8/3/20) 194#    Oncology Nutrition-Anthropometrics      Nutrition from 3/28/2022 in [facility] Dietitian Services Nutrition from 3/7/2022 in [facility] Oncology Dietitian Services   Patient age (years): 61 years 61 years   Patient (female) height (in): 61 in 61 in   Current Weight to be used for anthropometric calculations (lbs) 119 lbs 124 lbs   Current Weight to be used for anthropometric calculations (kg) 54 1 kg 56 4 kg   BMI: 24 25   IBW female: 95 lbs 95 lbs   IBW (kg) female: 43 2 kg 43 2 kg   IBW % (female) 125 3 % 130 5 %   Adjusted BW (female): 101 lbs 102 2 lbs   Adjusted BW kg (female): 45 9 kg 46 5 kg   % weight change after 1 month: -3 6 % -3 9 %   Weight change after 1 month (lbs) -4 4 lbs -5 lbs   % weight change after 3 months: -11 9 % -13 9 %   Weight change after 3 months (lbs) -16 lbs -20 lbs   % weight change after 6 months: -22 % -21 %   Weight change after 6 months (lbs) -33 5 lbs -33 lbs   % weight change after 1 year: -36 4 % -34 %         Nutrition-Focused Physical Findings:  severe muscle depletion ([place]) - hollowing/scooping/depression and severe body fat depletion (Orbital) - hollowing/depression/dark circles    Food/Nutrition-Related History & Client/Social History:    Current Nutrition Impact Symptoms:   [] Nausea  [x] Reduced Appetite - says she takes appetite stimulants prn, which has been daily lately [] Acid Reflux   [] Vomiting  [x] Unintended Wt Loss - ongoing, significant, undesirable [x] Malabsorption - S/p RNYGB in 2001   [] Diarrhea  [] Unintended Wt Gain  [] Dumping Syndrome   [x] Constipation - no recent BM  -forgot to try apple prune sauce (mixture has been successful for her in the past)  -On bowel protocol but not following it [] Thick Mucous/Secretions  [] Abdominal Pain    [] Dysgeusia (Altered Taste) [x] Xerostomia (Dry Mouth) - comes and goes  -uses bs/sw rinses and Biotene [] Gas    [] Dysosmia (Altered Smell)  [] Thrush  [] Difficulty Chewing    [x] Oral Mucositis (Sore Mouth) - had been better but sores are back again  -using biotene rinse and spray + bs/sw rinses [x] Fatigue  [x] Pain: pubic area, limits po intake   [] Odynophagia   [] Esophagitis  [] Other:    [] Dysphagia [] Early Satiety  [] No Problems Eating      Food Allergies & Intolerances: yes: had skin patch testing which showed allergy to strawberries, but says she is able to eat strawberries without any side effects    Current Diet: Small Frequent Meals  Current Nutrition Intake: Decreased since last visit   Appetite: Poor, Forcing self to eat - pushes self to eat, especially when has not eaten much early in the day  Nutrition Route: PO   Oral Care: Biotene rinse and spray, bs/sw rinses  Activity level: Sedentary  Limited by pain and medical condition  24 Hr Diet Recall: likes the Guerra's Well Yes soups  Breakfast: Ensure Clear, pretzels; yesterday: 1/2 of a cheese/onion omelet  Snack: 1/2 c panera broccoli cheddar soup   Lunch: 3 oz steak w/ salad with dressing and cayetano cheese  Snack: 1/4 c sweet potato with cinnamon and butter  Dinner: 2 oz pieces of steak  Snack: pretzels and ranch dip    Beverages: water (16 9 oz x2-3), iced tea (32 oz x1, occasionally more), Ensure Clear (10 oz x2)   -Does not drink coffee or alcohol      -Does not separate fluids from solids, does not have discomfort while eating and drinking at the same time    Supplements:    Ensure Clear (10 oz, 180 kcal, 8 g pro) - finds she can drink this, drinking BID   [employer] Ess[employer] ([id number] kcal, 5 g pro) mixed with 2% Fairlife Milk - says she doesn't think of drinking these   Unflavored Unjury Protein Powder mixed into foods - every once in awhile when has soup    Prepared Bonjoy but has not tried it yet, has Unjury protein-centric MR samples at home   Does not like Ensure or Boost    Oncology Nutrition-Estimated Needs      Nutrition from 3/28/2022 in [facility] 107 Oncology Dietitian Services Nutrition from 10/11/2021 in [facility] 107 Oncology Dietitian Services   Weight type used Actual weight Adjusted weight   Weight in kilograms (kg) used for estimated needs 54 1 kg 49 5 kg   Energy needs formula:  35-40 kcal/kg 35-40 kcal/kg   Energy needs based on 35 kcal/k kcal 1733 kcal   Energy needs based on 40 kcal/k kcal 1980 kcal   Protein needs formula: 1 5-2 g/kg 1 5-2 g/kg   Protein needs based on 1 5 g/kg 81 g 74 g   Protein needs based on 2 g/kg 108 g 99 g   Fluid needs formula: 30-35 mL/kg 30-35 mL/kg   Fluid needs based on 30 mL/kg 1623 mL 1485 mL   Fluid needs in ounces 55 oz 50 oz   Fluid needs based on 35 mL/kg 1894 mL 1733 mL   Fluid needs in ounces 64 oz 59 oz        Discussion & Intervention:    Ragini was evaluated today for an RD follow up regarding wt loss and pt request for dietitian involvement throughout tx  Ragini is currently undergoing tx for endometrial cancer  Ragini continues to experience significant, undesirable weight loss  She meets ASPEN criteria for severe protein-calorie malnutrition  Her appetite is very poor and she is forcing herself to eat  When she does not eat much during the day, she tries to push herself to eat more at night  She has found that she likes Ensure Clear so she has been drinking them BID  She is not drinking CIB and has yet to try the Bonjoy and Unjury MR samples  She occasionally mixes Unjury protein powder into soups  She likes the Guerra's Well Yes soups  Her mouth sores have returned  She continues with constipation and fatigue  Reviewed case with Clinical Trials RN, Leif Cleveland  today  Ragini has recognized that she does best with self-directed goals and not being told what to do  Goals and plan of care were selected by pt today  Supportive listening and encouragement were provided  Reviewed 24 hour recall, which revealed an inadequate po intake, and discussed ways to increase kcal, protein, and fluid intakes and optimize nutrient intake    Also reviewed the importance of wt management throughout the tx process and the role of a high kcal/ high protein diet in managing wt and overall health  Based on today's assessment, discussion included: MNT for: wt loss, lack of appetite, sore mouth, xerostomia, fatigue, malnutrition, practicing proper oral care, a high kcal/protein diet & food choices to include at all meals & snacks (Examples of high kcal foods: cheese, full-fat dairy products, nut butter, plant-based fats, coconut oil/milk, avocado, butter, cream soup, etc  Examples of high protein foods: eggs, chicken, fish, beans/legumes, nuts/nut butters, bone broth, etc ) , fortifying foods for added kcal and protein (examples include: adding cheese to foods such as eggs, mashed potatoes, casseroles, etc ; Making oatmeal with whole milk rather than water; Making fortified mashed potatoes with cream, butter, dry milk powder, plain [place] yogurt, and cheese ), adequate hydration & fluid choices, sipping on calorie containing beverages (examples include: adding whole milk or cream to coffee, oral nutrition supplements, juice, electrolyte replacement beverages, milk, etc ), eating smaller more frequent meals every 2-3 hours (5-6 small meals/day), having consistent and planned snacks between meals, eating when feeling most hungry, choosing higher kcal/protein oral nutrition supplements and increasing oral nutrition supplements and recipe suggestions/resources, trying new recipes, & cooking at home more often     Moving forward, Ragini was encouraged to increase kcal, protein, and fluid intakes  Materials Provided: Ensure Coupons  All questions and concerns addressed during todays visit  Ragini has RD contact information  Nutrition Diagnosis:  Inadequate Energy Intake related to physiological causes, disease state and treatment related issues as evidenced by food recall, wt loss and discussion with pt and/or family    Increased Nutrient Needs (kcal & pro) related to increased demand for nutrients and disease state as evidenced by cancer dx and pt undergoing tx for cancer  Altered GI Function related to alteration in GI tract motility and integrity as evidenced by Constipation and Oral Mucositis (Sore Mouth)  Patient has clinical indicators (or ASPEN criteria) consistent with severe protein-calorie malnutrition in the context of Chronic Illness as evidenced by >7 5% wt loss in 3 months, >10% wt loss in 6 months, >/=20% wt loss in 12 months, </=75% energy intake vs  Estimated needs for >/=1 month, severe muscle depletion ([place]) - hollowing/scooping/depression and severe body fat depletion (Orbital) - hollowing/depression/dark circles  Monitoring & Evaluation:   Goals:  · weight maintenance/stabilization  · adequate nutrition impact symptom management  · pt to meet >/=75% estimated nutrition needs daily     · Progress Towards Goals: Not Progressing and Not Met    Nutrition Rx & Recommendations:  · Diet: High Calorie, High Protein (for high calorie foods see pages 52-53, and for high protein foods see pages 49-51 in your Eating Hints book)  · Small, frequent meals/snacks may be easier to tolerate than 3 large daily meals  Aim for 5-6 small meals per day (every 2-3 hours)  · Include protein at all meals/snacks  · Avoid spicy, acidic, sharp/hard/crunchy foods & carbonated beverages as needed  · Follow proper oral care; Try baking soda/salt water rinse recipe (mix 3/4 tsp salt + 1 tsp baking soda + 1 qt water; rinse with plain water after using) in Eating Hints book (pg 18)  Brush your teeth before/after meals & before bed    · For appetite loss: try powdered or liquid nutrition supplements; eating by the clock every 2-3 hours; set a timer to remind yourself to eat, keep snacks nearby; add extra protein & calories to your diet; drink liquids in between meals, choose liquids that add calories; eat a bedtime snack; eat soft, cool or frozen foods; eat a larger meal when you feel well & rested; only sip small amounts of liquids during meals (see pages 10-12 in your Eating Hints book)  · For weight loss: monitor your weight at home at least 2x/week, record your weight, start by adding 250-500 extra calories per day, eat 5-6 small scheduled meals every 2-3 hrs, choose foods that are high in protein and calories (see pages 49-53 in your Eating Hints book), drink liquids with calories for example: milkshakes/smoothies/juice/soup/whole milk/chocolate milk, cook with protein fortified milk (see recipe on page 36 in your Eating Hints book), consider ready-to-drink oral nutrition supplements such as Ensure Plus, Ensure Enlive, Boost Plus, or Boost Very High Calorie, avoid "diet" and "light" foods when possible, avoid drinking too much with meals, contact your dietitian with any continued weight loss over the course of 1 week  For more info see pages 35-37 in your Eating Hints book  · For constipation: drink plenty of fluids (>64 oz/day); drink hot liquids; eat high-fiber foods as tolerated (whole grains, beans, peas, nuts, seeds, fruits, vegetables, etc); increase physical activity as tolerated  Avoid increasing fiber intake too quickly, add fiber into your diet slowly; keep a record of your bowel movements (see pages 13-14 in you Eating Hints book)  · For sore mouth/throat: choose foods that are easy to chew/swallow; cook foods until they are soft/tender; moisten & soften foods (gravy/sauce/broth/yogurt); cut food into small pieces; drink with a straw (as tolerated); eat with a very small spoon; eat cold or room temperature food; suck on ice chips; Avoid citrus, spicy foods, tomatoes/ketchup, salty foods, raw vegetables, sharp/crunchy/hard foods & alcohol (see pages 23-28 in your Eating Hints book)    · For dry mouth: sip water throughout the day; try very sweet (or tart, as tolerated) drinks; chew gum or suck on hard candy, popsicles, & ice chips; eat easy to swallow foods (such as pureed cooked foods or soups); moisten food with sauce/gravy/salad dressing; do not drink beer, wine, or any type of alcohol; keep lips moist with lip balm; avoid tobacco products & second-hand smoke; try Biotene as needed (see pages 17-18 in your Eating Hints book)  Follow proper oral care; Try baking soda/salt water rinse recipe (mix 3/4 tsp salt + 1 tsp baking soda + 1 qt water; rinse with pain water after using) in Eating Hints book (pg 18)  Brush your teeth before/after meals & before bed  · Weigh yourself regularly  If you notice weight loss, make an effort to increase your daily food/calorie intake  If you continue to notice loss after these efforts, reach out to your dietitian to establish a plan to stabilize weight  · Snack ideas: [place] yogurt with fruit, pudding, veggies with humus OR try greek yogurt based vegetable dip, peanut butter, grapes and cheese, fruit with a cheese stick , cottage cheese, soup, unjury, protein shakes/smoothies  · Continue to write down food intake each day  · Re-start apple prune sauce for constipation      Patient choosen goals:  · Nutrition Supplements:    · Ensure Clear 2X/day  · 1 serving of Unjury protein powder daily - add to Well Yes soups; add shredded cheese on top of soups  · Try to eat something every 1 hr, set alarm on your phone as a reminder    Follow Up Plan: 4/18/22 during infusion   Recommend Referral to Other Providers: none at this time

## 2022-03-23 DIAGNOSIS — C54.1 ENDOMETRIAL CANCER (HCC): Primary | ICD-10-CM

## 2022-03-25 ENCOUNTER — DOCUMENTATION (OUTPATIENT)
Dept: OTHER | Facility: HOSPITAL | Age: 67
End: 2022-03-25

## 2022-03-25 ENCOUNTER — HOSPITAL ENCOUNTER (OUTPATIENT)
Dept: INFUSION CENTER | Facility: HOSPITAL | Age: 67
Discharge: HOME | End: 2022-03-25
Payer: COMMERCIAL

## 2022-03-25 ENCOUNTER — OFFICE VISIT (OUTPATIENT)
Dept: GYNECOLOGIC ONCOLOGY | Facility: CLINIC | Age: 67
End: 2022-03-25
Payer: COMMERCIAL

## 2022-03-25 VITALS
TEMPERATURE: 97.1 F | SYSTOLIC BLOOD PRESSURE: 130 MMHG | DIASTOLIC BLOOD PRESSURE: 84 MMHG | OXYGEN SATURATION: 100 % | BODY MASS INDEX: 24.27 KG/M2 | HEIGHT: 59 IN | HEART RATE: 58 BPM | WEIGHT: 120.37 LBS

## 2022-03-25 VITALS — TEMPERATURE: 97.4 F

## 2022-03-25 DIAGNOSIS — M80.00XS OSTEOPOROSIS WITH CURRENT PATHOLOGICAL FRACTURE, UNSPECIFIED OSTEOPOROSIS TYPE, SEQUELA: ICD-10-CM

## 2022-03-25 DIAGNOSIS — C54.1 ENDOMETRIAL CANCER (HCC): Primary | Chronic | ICD-10-CM

## 2022-03-25 DIAGNOSIS — T45.1X5A CHEMOTHERAPY INDUCED NEUTROPENIA (HCC): ICD-10-CM

## 2022-03-25 DIAGNOSIS — C54.1 ENDOMETRIAL CANCER (HCC): Primary | ICD-10-CM

## 2022-03-25 DIAGNOSIS — D70.1 CHEMOTHERAPY INDUCED NEUTROPENIA (HCC): ICD-10-CM

## 2022-03-25 DIAGNOSIS — E83.42 HYPOMAGNESEMIA: ICD-10-CM

## 2022-03-25 DIAGNOSIS — N13.5 OBSTRUCTION OF RIGHT URETER: ICD-10-CM

## 2022-03-25 LAB
ALBUMIN SERPL BCP-MCNC: 3.4 G/DL (ref 3.5–5)
ALP SERPL-CCNC: 133 U/L (ref 46–116)
ALT SERPL W P-5'-P-CCNC: 35 U/L (ref 12–78)
AMYLASE SERPL-CCNC: 28 IU/L (ref 25–115)
ANION GAP SERPL CALCULATED.3IONS-SCNC: 6 MMOL/L (ref 4–13)
APTT PPP: 55 SECONDS (ref 23–37)
AST SERPL W P-5'-P-CCNC: 34 U/L (ref 5–45)
BACTERIA UR QL AUTO: ABNORMAL /HPF
BASOPHILS # BLD AUTO: 0.04 THOUSANDS/ΜL (ref 0–0.1)
BASOPHILS NFR BLD AUTO: 1 % (ref 0–1)
BILIRUB SERPL-MCNC: 0.34 MG/DL (ref 0.2–1)
BILIRUB UR QL STRIP: NEGATIVE
BUN SERPL-MCNC: 12 MG/DL (ref 5–25)
CALCIUM ALBUM COR SERPL-MCNC: 10 MG/DL (ref 8.3–10.1)
CALCIUM SERPL-MCNC: 9.5 MG/DL (ref 8.3–10.1)
CHLORIDE SERPL-SCNC: 107 MMOL/L (ref 100–108)
CHOLEST SERPL-MCNC: 177 MG/DL
CLARITY UR: CLEAR
CO2 SERPL-SCNC: 22 MMOL/L (ref 21–32)
COLOR UR: ABNORMAL
CREAT SERPL-MCNC: 1.04 MG/DL (ref 0.6–1.3)
CREAT UR-MCNC: 23.3 MG/DL
EOSINOPHIL # BLD AUTO: 0.06 THOUSAND/ΜL (ref 0–0.61)
EOSINOPHIL NFR BLD AUTO: 1 % (ref 0–6)
ERYTHROCYTE [DISTWIDTH] IN BLOOD BY AUTOMATED COUNT: 14.6 % (ref 11.6–15.1)
GFR SERPL CREATININE-BSD FRML MDRD: 56 ML/MIN/1.73SQ M
GGT SERPL-CCNC: 12 U/L (ref 5–85)
GLUCOSE SERPL-MCNC: 89 MG/DL (ref 65–140)
GLUCOSE UR STRIP-MCNC: NEGATIVE MG/DL
HCT VFR BLD AUTO: 27.9 % (ref 34.8–46.1)
HGB BLD-MCNC: 9.5 G/DL (ref 11.5–15.4)
HGB UR QL STRIP.AUTO: ABNORMAL
HYALINE CASTS #/AREA URNS LPF: ABNORMAL /LPF
IMM GRANULOCYTES # BLD AUTO: 0.02 THOUSAND/UL (ref 0–0.2)
IMM GRANULOCYTES NFR BLD AUTO: 1 % (ref 0–2)
INR PPP: 1.12 (ref 0.84–1.19)
KETONES UR STRIP-MCNC: NEGATIVE MG/DL
LEUKOCYTE ESTERASE UR QL STRIP: ABNORMAL
LIPASE SERPL-CCNC: 115 U/L (ref 73–393)
LYMPHOCYTES # BLD AUTO: 0.56 THOUSANDS/ΜL (ref 0.6–4.47)
LYMPHOCYTES NFR BLD AUTO: 13 % (ref 14–44)
MAGNESIUM SERPL-MCNC: 1.9 MG/DL (ref 1.6–2.6)
MCH RBC QN AUTO: 32.5 PG (ref 26.8–34.3)
MCHC RBC AUTO-ENTMCNC: 34.1 G/DL (ref 31.4–37.4)
MCV RBC AUTO: 96 FL (ref 82–98)
MONOCYTES # BLD AUTO: 0.49 THOUSAND/ΜL (ref 0.17–1.22)
MONOCYTES NFR BLD AUTO: 11 % (ref 4–12)
NEUTROPHILS # BLD AUTO: 3.18 THOUSANDS/ΜL (ref 1.85–7.62)
NEUTS SEG NFR BLD AUTO: 73 % (ref 43–75)
NITRITE UR QL STRIP: NEGATIVE
NON-SQ EPI CELLS URNS QL MICRO: ABNORMAL /HPF
NRBC BLD AUTO-RTO: 0 /100 WBCS
PH UR STRIP.AUTO: 5.5 [PH]
PHOSPHATE SERPL-MCNC: 3.4 MG/DL (ref 2.3–4.1)
PLATELET # BLD AUTO: 189 THOUSANDS/UL (ref 149–390)
PMV BLD AUTO: 11.2 FL (ref 8.9–12.7)
POTASSIUM SERPL-SCNC: 3.8 MMOL/L (ref 3.5–5.3)
PROT SERPL-MCNC: 7.6 G/DL (ref 6.4–8.2)
PROT UR STRIP-MCNC: NEGATIVE MG/DL
PROT UR-MCNC: 22 MG/DL
PROT/CREAT UR: 0.94 MG/G{CREAT} (ref 0–0.1)
PROTHROMBIN TIME: 13.9 SECONDS (ref 11.6–14.5)
RBC # BLD AUTO: 2.92 MILLION/UL (ref 3.81–5.12)
RBC #/AREA URNS AUTO: ABNORMAL /HPF
SODIUM SERPL-SCNC: 135 MMOL/L (ref 136–145)
SP GR UR STRIP.AUTO: 1.01 (ref 1–1.03)
T3 SERPL-MCNC: 1 NG/ML (ref 0.6–1.8)
T4 FREE SERPL-MCNC: 1.2 NG/DL (ref 0.76–1.46)
TSH SERPL DL<=0.05 MIU/L-ACNC: 0.85 UIU/ML (ref 0.36–3.74)
UROBILINOGEN UR STRIP-ACNC: <2 MG/DL
WBC # BLD AUTO: 4.35 THOUSAND/UL (ref 4.31–10.16)
WBC #/AREA URNS AUTO: ABNORMAL /HPF

## 2022-03-25 PROCEDURE — 83735 ASSAY OF MAGNESIUM: CPT

## 2022-03-25 PROCEDURE — 82150 ASSAY OF AMYLASE: CPT

## 2022-03-25 PROCEDURE — 85730 THROMBOPLASTIN TIME PARTIAL: CPT

## 2022-03-25 PROCEDURE — 82465 ASSAY BLD/SERUM CHOLESTEROL: CPT

## 2022-03-25 PROCEDURE — 84100 ASSAY OF PHOSPHORUS: CPT

## 2022-03-25 PROCEDURE — 3008F BODY MASS INDEX DOCD: CPT | Performed by: INTERNAL MEDICINE

## 2022-03-25 PROCEDURE — 84439 ASSAY OF FREE THYROXINE: CPT

## 2022-03-25 PROCEDURE — 84156 ASSAY OF PROTEIN URINE: CPT

## 2022-03-25 PROCEDURE — 81001 URINALYSIS AUTO W/SCOPE: CPT

## 2022-03-25 PROCEDURE — 85025 COMPLETE CBC W/AUTO DIFF WBC: CPT

## 2022-03-25 PROCEDURE — 84443 ASSAY THYROID STIM HORMONE: CPT

## 2022-03-25 PROCEDURE — 85610 PROTHROMBIN TIME: CPT

## 2022-03-25 PROCEDURE — 82977 ASSAY OF GGT: CPT

## 2022-03-25 PROCEDURE — 99215 OFFICE O/P EST HI 40 MIN: CPT | Performed by: PHYSICIAN ASSISTANT

## 2022-03-25 PROCEDURE — 84480 ASSAY TRIIODOTHYRONINE (T3): CPT

## 2022-03-25 PROCEDURE — 83690 ASSAY OF LIPASE: CPT

## 2022-03-25 PROCEDURE — 80053 COMPREHEN METABOLIC PANEL: CPT

## 2022-03-25 PROCEDURE — 82570 ASSAY OF URINE CREATININE: CPT

## 2022-03-25 RX ORDER — SODIUM CHLORIDE 9 MG/ML
20 INJECTION, SOLUTION INTRAVENOUS CONTINUOUS
Status: DISCONTINUED | OUTPATIENT
Start: 2022-03-28 | End: 2022-03-31 | Stop reason: HOSPADM

## 2022-03-25 NOTE — ASSESSMENT & PLAN NOTE
Recurrent stage IB endometrial cancer currently on ENDOBARR clinical trial presents  She has received 22 cycles  Overall, she is tolerating treatment well  She has treatment related fatigue and decreased appetite  Labs from today pending, CBC/Diff reviewed  Proceed with next cycle of treatment as scheduled  CT imaging per protocol scheduled early April  Return to the office per protocol

## 2022-03-25 NOTE — PROGRESS NOTES
Pt here for labs only  Port accessed, flushed, labs obtained, flushed again, and de-accessed without incident   Declines AVS

## 2022-03-25 NOTE — PROGRESS NOTES
Labs have been reviewed and signed by Jaylen Wilson PA-C on 3/25/2022   Per protocol, patient is ok to proceed with Cycle 23 Day 1 treatment on [facility] 3/28/22

## 2022-03-25 NOTE — PROGRESS NOTES
Patient was seen in the office for her Cycle 23 Day 1 pretreatment office visit for [facility] with Mone Etienne PA-C  Patient states that she is doing well overall  She continues to have difficulty with her appetite Patient states she feels she may need to switch to even smaller more frequent meals, and that she will set an alarm reminder  She attempted to try carnation instant breakfast but still wasnt hungry in the morning  Clinical trials nurse reviewed if she is able tolerate more po in evening that she can drink then or another type of supplement  Constipation is still present but patient states it is manageable with miralax and occasional ducolax, she has also started lactulose po every other day  Patient will continue with palliative care as well as bariatrics and nutritionist   All AEs and Conmeds were reviewed  Future appointments were reviewed  Overall, patient states that she is feeling okay, and was told to call with any questions or concerns

## 2022-03-25 NOTE — PROGRESS NOTES
Assessment/Plan:    Problem List Items Addressed This Visit        Genitourinary    Endometrial cancer ([facility] [address] ) - Primary (Chronic)     Recurrent stage IB endometrial cancer currently on ENDOBARR clinical trial presents  She has received 22 cycles  Overall, she is tolerating treatment well  She has treatment related fatigue and decreased appetite  Labs from today pending, CBC/Diff reviewed  Proceed with next cycle of treatment as scheduled  CT imaging per protocol scheduled early April  Return to the office per protocol  Obstruction of right ureter     Follow-up with urology scheduled for May  CHIEF COMPLAINT:   Pre-treatment visit on clinical trial     Problem:  Cancer Staging  Endometrial cancer [facility])  Staging form: Corpus Uteri - Carcinoma, AJCC 7th Edition  - Clinical stage from 12/19/2017: FIGO Stage IB (T1b, N0, M0) - Signed by Madeline Moore MD on 2/12/2018        Previous therapy:  Oncology History   [facility] )   11/17/2017 Initial Diagnosis    Endometrial cancer ([facility] )     11/17/2017 Biopsy    ENDOMETRIAL BIOPSY: WELL DIFFERENTIATED ENDOMETRIAL ADENOCARCINOMA (FIGO I) WITH FOCALMUCINOUS FEATURES  Part B: ENDOCERVICAL POLYP:BENIGN ENDOCERVICAL      12/19/2017 Surgery    Robotic assisted total laparoscopic hysterectomy with bilateral salpingo-oophorectomy and sentinel bilateral pelvic lymph node dissection  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 (4 4 x 3 2 cm tumor, 9 4/15 4mm invasion, NO LVSI, washings revealed atypical cellular changes)     12/19/2017 Genetic Testing    Morrison testing negative     6/28/2019 Biopsy    A  Breast, Right, US BX Right Breast 1000 4 cmfn:  - Benign breast tissue with focal histiocytic aggregate  See comment   - Negative for atypia and in-situ or invasive carcinoma       7/8/2019 Recurrence    Presented with right lower extremity DVT and CT demonstrating right pelvic sidewall mass with venous, ureteral and nerve compression causing significant neuropathic pain  Biopsy:  Lymph Node, Right pelvic lymph node x3:  - High-grade adenocarcinoma  7/30/2019 - 1/6/2020 Chemotherapy    Taxol 175 mg/m2 and carboplatin AUC 6 every 21 days  Dose was reduced to taxol 135 mg/m2 and carboplatin AUC 5  Completed 6 cycles  Treatment protracted due to multiple hospital admissions  2/24/2020 - 4/13/2020 Radiation    adjuvant external beam radiation therapy to the whole pelvis to 4500 cGy followed by boost to gross disease of an additional 2200 cGy     11/23/2020 Progression    New necrotic adenopathy in the retroperitoneum on CT      12/21/2020 -  Chemotherapy    Began chemotherapy with rucaparib, atezolizumab, and bevacizumab as per [facility] clinical trial            Patient ID: Mary Thompson is a 61 y o  female  who presents to the office for pre-treatment evaluation on ENDOBannerR clinical trial  Overall, she is tolerating treatment well without new complaints  She notes intermittent fatigue and poor appetite  She is without n/v/abdominal pain  She denies diarrhea or bloody stool  She is constipated, and managing this  She notes her blood pressures are controlled  She denies skin rashes, new SOB/cough  CBC/Diff from 3/25/22 was resulted and reviewed  All other trial labs pending  The following portions of the patient's history were reviewed and updated as appropriate: allergies, current medications, past medical history, past surgical history and problem list     Review of Systems   Constitutional: Positive for appetite change (decreased) and fatigue  Negative for chills and fever  HENT: Negative  Eyes: Negative  Respiratory: Negative  Cardiovascular: Negative  Gastrointestinal: Positive for constipation (occasional)  Negative for abdominal pain, diarrhea and vomiting  Genitourinary: Negative  Musculoskeletal: Negative  Skin: Negative  Neurological: Negative      Psychiatric/Behavioral: Negative  Current Outpatient Medications   Medication Sig Dispense Refill    acetaminophen-codeine (TYLENOL #3) 300-30 mg per tablet Take 1 tablet by mouth every 6 (six) hours as needed for moderate pain 20 tablet 0    ARIPiprazole (ABILIFY) 10 mg tablet Take 20 mg by mouth daily        aspirin (ECOTRIN LOW STRENGTH) 81 mg EC tablet Take 81 mg by mouth daily      Calcium-Magnesium-Vitamin D (CALCIUM 500 PO) Take by mouth daily       cholecalciferol (VITAMIN D3) 1,000 units tablet Take 1,000 Units by mouth daily      Cyanocobalamin (VITAMIN B12 PO) Take by mouth daily       dronabinol (MARINOL) 2 5 mg capsule Take 1 capsule (2 5 mg total) by mouth 2 (two) times a day before meals 60 capsule 0    Fiber Adult Gummies 2 g CHEW Chew 1 tablet in the morning 90 tablet 1    folic acid (FOLVITE) 1 mg tablet Take 1 tablet (1 mg total) by mouth daily  0    gemfibrozil (LOPID) 600 mg tablet TAKE 1 TABLET BY MOUTH EVERY DAY 90 tablet 1    lactulose (CHRONULAC) 10 g/15 mL solution TAKE 45 ML (30 G TOTAL) BY MOUTH 2 (TWO) TIMES A  mL 2    LORazepam (ATIVAN) 0 5 mg tablet Take 1 tablet (0 5 mg total) by mouth every 6 (six) hours as needed for anxiety (or nausea) 36 tablet 1    Multiple Vitamin (MULTIVITAMIN) tablet Take 1 tablet by mouth daily      naloxone (NARCAN) 4 mg/0 1 mL nasal spray Administer 1 spray into a nostril  If no response after 2-3 minutes, give another dose in the other nostril using a new spray   1 each 0    omeprazole (PriLOSEC) 20 mg delayed release capsule Take 20 mg by mouth daily      ondansetron (ZOFRAN) 8 mg tablet Take 1 tablet (8 mg total) by mouth every 8 (eight) hours as needed for nausea or vomiting 30 tablet 1    oxybutynin (DITROPAN XL) 15 MG 24 hr tablet TAKE 1 TABLET BY MOUTH DAILY AT BEDTIME 90 tablet 3    phenazopyridine (PYRIDIUM) 200 mg tablet Take 1 tablet (200 mg total) by mouth daily as needed for bladder spasms 30 tablet 0    rucaparib (RUBRACA) 300 mg tablet Take 600 mg by mouth every 12 (twelve) hours Take with or without food  Do not repeat a vomited dose   saccharomyces boulardii (FLORASTOR) 250 mg capsule Take 1 capsule (250 mg total) by mouth 2 (two) times a day  0    venlafaxine (EFFEXOR) 75 mg tablet Take 75 mg by mouth 3 (three) times a day        quinapril (ACCUPRIL) 5 mg tablet TAKE 1 TABLET BY MOUTH EVERYDAY AT BEDTIME 90 tablet 0    Sodium Chloride Flush (Normal Saline Flush) 0 9 % SOLN         No current facility-administered medications for this visit  Objective:    Blood pressure 130/84, pulse 58, temperature (!) 97 1 °F (36 2 °C), height 4' 11" (1 499 m), weight 54 6 kg (120 lb 5 9 oz), SpO2 100 %, not currently breastfeeding  Body mass index is 24 31 kg/m²  Body surface area is 1 49 meters squared  Physical Exam  Vitals reviewed  Constitutional:       General: She is not in acute distress  Appearance: Normal appearance  She is not ill-appearing  HENT:      Head: Normocephalic and atraumatic  Mouth/Throat:      Mouth: Mucous membranes are moist    Eyes:      General: No scleral icterus  Right eye: No discharge  Left eye: No discharge  Conjunctiva/sclera: Conjunctivae normal    Pulmonary:      Effort: Pulmonary effort is normal    Musculoskeletal:      Right lower leg: No edema  Left lower leg: No edema  Skin:     General: Skin is warm and dry  Coloration: Skin is not jaundiced  Findings: No rash  Neurological:      General: No focal deficit present  Mental Status: She is alert and oriented to person, place, and time  Cranial Nerves: No cranial nerve deficit  Sensory: No sensory deficit  Motor: No weakness  Gait: Gait normal    Psychiatric:         Mood and Affect: Mood normal          Behavior: Behavior normal          Thought Content: Thought content normal          Judgment: Judgment normal      Performance status is zero         Lab Results Component Value Date     10/27/2017    K 3 8 03/25/2022     03/25/2022    CO2 22 03/25/2022    BUN 12 03/25/2022    CREATININE 1 04 03/25/2022    GLUCOSE 219 (H) 12/19/2017    GLUF 86 03/04/2022    CALCIUM 9 5 03/25/2022    CORRECTEDCA 10 0 03/25/2022    AST 34 03/25/2022    ALT 35 03/25/2022    ALKPHOS 133 (H) 03/25/2022    PROT 6 9 10/27/2017    BILITOT 0 3 10/27/2017    EGFR 56 03/25/2022     Lab Results   Component Value Date    WBC 4 35 03/25/2022    HGB 9 5 (L) 03/25/2022    HCT 27 9 (L) 03/25/2022    MCV 96 03/25/2022     03/25/2022     Lab Results   Component Value Date    NEUTROABS 3 18 03/25/2022

## 2022-03-28 ENCOUNTER — HOSPITAL ENCOUNTER (OUTPATIENT)
Dept: INFUSION CENTER | Facility: CLINIC | Age: 67
Discharge: HOME | End: 2022-03-28
Payer: COMMERCIAL

## 2022-03-28 ENCOUNTER — DOCUMENTATION (OUTPATIENT)
Dept: OTHER | Facility: HOSPITAL | Age: 67
End: 2022-03-28

## 2022-03-28 ENCOUNTER — NUTRITION (OUTPATIENT)
Dept: NUTRITION | Facility: CLINIC | Age: 67
End: 2022-03-28

## 2022-03-28 VITALS
DIASTOLIC BLOOD PRESSURE: 62 MMHG | HEART RATE: 73 BPM | SYSTOLIC BLOOD PRESSURE: 102 MMHG | BODY MASS INDEX: 23.99 KG/M2 | TEMPERATURE: 96.3 F | WEIGHT: 119 LBS | RESPIRATION RATE: 18 BRPM | OXYGEN SATURATION: 98 % | HEIGHT: 59 IN

## 2022-03-28 DIAGNOSIS — Z71.3 NUTRITIONAL COUNSELING: Primary | ICD-10-CM

## 2022-03-28 PROCEDURE — 96413 CHEMO IV INFUSION 1 HR: CPT

## 2022-03-28 PROCEDURE — J9999Q0 INV ATEZOLIZUMAB 1200MG/20ML 20 ML: Performed by: PHYSICIAN ASSISTANT

## 2022-03-28 PROCEDURE — J9035Q0 INV BEVACIZUMAB 400MG/16ML 16 ML: Performed by: PHYSICIAN ASSISTANT

## 2022-03-28 PROCEDURE — 96417 CHEMO IV INFUS EACH ADDL SEQ: CPT

## 2022-03-28 RX ADMIN — SODIUM CHLORIDE 20 ML/HR: 0.9 INJECTION, SOLUTION INTRAVENOUS at 08:15

## 2022-03-28 RX ADMIN — Medication 1200 MG: at 09:05

## 2022-03-28 RX ADMIN — Medication 878 MG: at 09:45

## 2022-03-28 NOTE — PROGRESS NOTES
Patient here for day 1 cycle 23 clinical trial atezolizumab and bevacizumab  Labs reviewed from 3/25, note from provider and clinical trials reviewed, patient okay for treatment today  Patient offers minimal complaints, says she does have some constipation and appetite loss, pt does follow with oncology dietician, currently at chairside for follow up  Vitals stable  Call bell within reach, will continue to monitor

## 2022-03-28 NOTE — PATIENT INSTRUCTIONS
Nutrition Rx & Recommendations:  · Diet: High Calorie, High Protein (for high calorie foods see pages 52-53, and for high protein foods see pages 49-51 in your Eating Hints book)  · Small, frequent meals/snacks may be easier to tolerate than 3 large daily meals  Aim for 5-6 small meals per day (every 2-3 hours)  · Include protein at all meals/snacks  · Avoid spicy, acidic, sharp/hard/crunchy foods & carbonated beverages as needed  · Follow proper oral care; Try baking soda/salt water rinse recipe (mix 3/4 tsp salt + 1 tsp baking soda + 1 qt water; rinse with plain water after using) in Eating Hints book (pg 18)  Brush your teeth before/after meals & before bed  · For appetite loss: try powdered or liquid nutrition supplements; eating by the clock every 2-3 hours; set a timer to remind yourself to eat, keep snacks nearby; add extra protein & calories to your diet; drink liquids in between meals, choose liquids that add calories; eat a bedtime snack; eat soft, cool or frozen foods; eat a larger meal when you feel well & rested; only sip small amounts of liquids during meals (see pages 10-12 in your Eating Hints book)  · For weight loss: monitor your weight at home at least 2x/week, record your weight, start by adding 250-500 extra calories per day, eat 5-6 small scheduled meals every 2-3 hrs, choose foods that are high in protein and calories (see pages 49-53 in your Eating Hints book), drink liquids with calories for example: milkshakes/smoothies/juice/soup/whole milk/chocolate milk, cook with protein fortified milk (see recipe on page 36 in your Eating Hints book), consider ready-to-drink oral nutrition supplements such as Ensure Plus, Ensure Enlive, Boost Plus, or Boost Very High Calorie, avoid "diet" and "light" foods when possible, avoid drinking too much with meals, contact your dietitian with any continued weight loss over the course of 1 week    For more info see pages 35-37 in your Eating Hints book   · For constipation: drink plenty of fluids (>64 oz/day); drink hot liquids; eat high-fiber foods as tolerated (whole grains, beans, peas, nuts, seeds, fruits, vegetables, etc); increase physical activity as tolerated  Avoid increasing fiber intake too quickly, add fiber into your diet slowly; keep a record of your bowel movements (see pages 13-14 in you Eating Hints book)  · For sore mouth/throat: choose foods that are easy to chew/swallow; cook foods until they are soft/tender; moisten & soften foods (gravy/sauce/broth/yogurt); cut food into small pieces; drink with a straw (as tolerated); eat with a very small spoon; eat cold or room temperature food; suck on ice chips; Avoid citrus, spicy foods, tomatoes/ketchup, salty foods, raw vegetables, sharp/crunchy/hard foods & alcohol (see pages 23-28 in your Eating Hints book)  · For dry mouth: sip water throughout the day; try very sweet (or tart, as tolerated) drinks; chew gum or suck on hard candy, popsicles, & ice chips; eat easy to swallow foods (such as pureed cooked foods or soups); moisten food with sauce/gravy/salad dressing; do not drink beer, wine, or any type of alcohol; keep lips moist with lip balm; avoid tobacco products & second-hand smoke; try Biotene as needed (see pages 17-18 in your Eating Hints book)  Follow proper oral care; Try baking soda/salt water rinse recipe (mix 3/4 tsp salt + 1 tsp baking soda + 1 qt water; rinse with pain water after using) in Eating Hints book (pg 18)  Brush your teeth before/after meals & before bed  · Weigh yourself regularly  If you notice weight loss, make an effort to increase your daily food/calorie intake  If you continue to notice loss after these efforts, reach out to your dietitian to establish a plan to stabilize weight     · Snack ideas: [place] yogurt with fruit, pudding, veggies with humus OR try greek yogurt based vegetable dip, peanut butter, grapes and cheese, fruit with a cheese stick , cottage cheese, soup, unjury, protein shakes/smoothies  · Continue to write down food intake each day  · Re-start apple prune sauce for constipation      Patient choosen goals:  · Nutrition Supplements:    · Ensure Clear 2X/day  · 1 serving of Unjury protein powder daily - add to Well Yes soups; add shredded cheese on top of soups  · Try to eat something every 1 hr, set alarm on your phone as a reminder    Follow Up Plan: 4/18/22 during infusion   Recommend Referral to Other Providers: none at this time

## 2022-03-28 NOTE — PROGRESS NOTES
Patient was seen in [address] [place] [address] for Cycle 23 Day 1 treatment  Patient was given Cycle 23 Rucaparib pills and drug diary, and patient returned Cycle 21 study pills, and diary  Patient denied any changes to AE'S and conmeds since office visit on 3/25/22   She states that she is feeling well overall  Patient was told to call with any questions or concerns, Patient tolerates treatment without incident

## 2022-03-30 PROCEDURE — 93010 ELECTROCARDIOGRAM REPORT: CPT | Performed by: INTERNAL MEDICINE

## 2022-04-01 ENCOUNTER — TELEPHONE (OUTPATIENT)
Dept: GYNECOLOGIC ONCOLOGY | Facility: CLINIC | Age: 67
End: 2022-04-01

## 2022-04-01 DIAGNOSIS — I82.4Y1 ACUTE DEEP VEIN THROMBOSIS (DVT) OF PROXIMAL VEIN OF RIGHT LOWER EXTREMITY (HCC): Primary | ICD-10-CM

## 2022-04-01 DIAGNOSIS — E78.5 DYSLIPIDEMIA: ICD-10-CM

## 2022-04-01 DIAGNOSIS — E78.5 DYSLIPIDEMIA: Primary | ICD-10-CM

## 2022-04-01 RX ORDER — GEMFIBROZIL 600 MG/1
TABLET, FILM COATED ORAL
Qty: 90 TABLET | Refills: 0 | Status: SHIPPED | OUTPATIENT
Start: 2022-04-01 | End: 2022-05-10

## 2022-04-01 NOTE — TELEPHONE ENCOUNTER
Pt states that her Lovenox refills were cancelled by the pharmacy and would like to know if something changed with her rx, please advise

## 2022-04-01 NOTE — TELEPHONE ENCOUNTER
Return call placed to patient  New rx submitted for lovenox 80 mg Q 24 hrs  Dose-adjusted due to patient's weight loss

## 2022-04-05 DIAGNOSIS — C54.1 ENDOMETRIAL CANCER (HCC): Primary | ICD-10-CM

## 2022-04-08 NOTE — PROGRESS NOTES
Outpatient Oncology Nutrition Consult  Type of Consult: Follow Up  Care Location: [facility]   Nutrition Assessment:  Oncology Diagnosis & Treatments:   · Endometrial cancer  · S/p surgery 12/19/17  · Recurrence 7/8/19  · S/p chemotherapy (carboplatin/taxol from 7/30/19-1/6/20)  · S/p whole pelvis RT (2/4/20- 4/13/20)  · Disease progression  · ENDOBAR trial started 12/21/20    Oncology History   Endometrial cancer ([facility] [address] )   11/17/2017 Initial Diagnosis    Endometrial cancer ([facility] [address] )     11/17/2017 Biopsy    ENDOMETRIAL BIOPSY: WELL DIFFERENTIATED ENDOMETRIAL ADENOCARCINOMA (FIGO I) WITH FOCALMUCINOUS FEATURES  Part B: ENDOCERVICAL POLYP:BENIGN ENDOCERVICAL      12/19/2017 Surgery    Robotic assisted total laparoscopic hysterectomy with bilateral salpingo-oophorectomy and sentinel bilateral pelvic lymph node dissection  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 (4 4 x 3 2 cm tumor, 9 4/15 4mm invasion, NO LVSI, washings revealed atypical cellular changes)     12/19/2017 Genetic Testing    Morrison testing negative     6/28/2019 Biopsy    A  Breast, Right, US BX Right Breast 1000 4 cmfn:  - Benign breast tissue with focal histiocytic aggregate  See comment   - Negative for atypia and in-situ or invasive carcinoma  7/8/2019 Recurrence    Presented with right lower extremity DVT and CT demonstrating right pelvic sidewall mass with venous, ureteral and nerve compression causing significant neuropathic pain  Biopsy:  Lymph Node, Right pelvic lymph node x3:  - High-grade adenocarcinoma  7/30/2019 - 1/6/2020 Chemotherapy    Taxol 175 mg/m2 and carboplatin AUC 6 every 21 days  Dose was reduced to taxol 135 mg/m2 and carboplatin AUC 5  Completed 6 cycles  Treatment protracted due to multiple hospital admissions       2/24/2020 - 4/13/2020 Radiation    adjuvant external beam radiation therapy to the whole pelvis to 4500 cGy followed by boost to gross disease of an additional 2200 cGy 11/23/2020 Progression    New necrotic adenopathy in the retroperitoneum on CT      12/21/2020 -  Chemotherapy    Began chemotherapy with rucaparib, atezolizumab, and bevacizumab as per [facility] clinical trial        PMH: RNYGB (2001 at [address])    Past Medical History:   Diagnosis Date    Anemia     Chemotherapy follow-up examination     Depression     Endometrial cancer ([address] ) 12/2017    Hyperglycemia     vx type 2 dm -- last assessed 4/1/14; resolved 11/7/17    Hyperlipidemia     Hypertension     Obesity     last assessed 10/14/17; resolved 11/7/17     Past Surgical History:   Procedure Laterality Date    ABDOMINAL SURGERY      GASTRIC BYPASS    CHOLECYSTECTOMY      at the time of gastric bypass    COLONOSCOPY      CT NEEDLE BIOPSY LYMPH NODE  7/8/2019    FL GUIDED CENTRAL VENOUS ACCESS DEVICE INSERTION  11/12/2019    GASTRIC BYPASS      HYSTERECTOMY Bilateral     total abdominal with salpingo-oophorectomy    IR NEPHROSTOMY TUBE PLACEMENT  7/26/2019    IR NEPHROURETERAL STENT CHECK/CHANGE/REPOSITION  4/6/2021    IR NEPHROURETERAL STENT CHECK/CHANGE/REPOSITION  6/4/2021    IR NEPHROURETERAL STENT CHECK/CHANGE/REPOSITION  9/3/2021    IR NEPHROURETERAL STENT CHECK/CHANGE/REPOSITION  12/7/2021    IR NEPHROURETERAL STENT CHECK/CHANGE/REPOSITION  3/8/2022    IR PICC LINE  9/27/2019    IR PORT PLACEMENT  7/26/2019    IR PORT REMOVAL  9/20/2019    OOPHORECTOMY Bilateral     WY INSJ TUNNELED CTR VAD W/SUBQ PORT AGE 5 YR/> Left 11/12/2019    Procedure: INSERTION VENOUS PORT ( PORT-A-CATH) IR;  Surgeon: Vivek Roger DO;  [facility];  Service: Interventional Radiology    [place] LAP, RADICAL HYST W/ TUBE&OV, NODE BX N/A 12/19/2017    Procedure: HYSTERECTOMY LAPAROSCOPIC TOTAL ([address]) W/ ROBOTICS; BILATERAL SALPINGOOPHERECTOMY; LYMPH NODE DISSECTION; lysis of adhesions;  Surgeon: Lianne Chang MD;  Location: [facility];  Service: Gynecology Oncology    W[place] LAP,DIAGNOSTIC ABDOMEN N/A 12/19/2017    Procedure: LAPAROSCOPY DIAGNOSTIC;  Surgeon: Mann Tanner MD;  [facility];  Service: Gynecology Oncology    TONSILLECTOMY      US GUIDED BREAST BIOPSY RIGHT COMPLETE Right 6/28/2019       Review of Medications:   Vitamins, Supplements and Herbals: yes: Hx RNYGB regimen: Vitamin D, Folic Acid, Align, calcium citrate TID, B12 1000 mcg QD, MVI BID (MVI does not have iron);  Iron 65 mg QD (2 hrs seperate from calcium)    Current Outpatient Medications:     acetaminophen-codeine (TYLENOL #3) 300-30 mg per tablet, Take 1 tablet by mouth every 6 (six) hours as needed for moderate pain, Disp: 20 tablet, Rfl: 0    ARIPiprazole (ABILIFY) 10 mg tablet, Take 20 mg by mouth daily  , Disp: , Rfl:     aspirin (ECOTRIN LOW STRENGTH) 81 mg EC tablet, Take 81 mg by mouth daily, Disp: , Rfl:     Calcium-Magnesium-Vitamin D (CALCIUM 500 PO), Take by mouth daily , Disp: , Rfl:     cholecalciferol (VITAMIN D3) 1,000 units tablet, Take 1,000 Units by mouth daily, Disp: , Rfl:     clotrimazole-betamethasone (LOTRISONE) 1-0 05 % cream, Apply topically 2 (two) times a day, Disp: 30 g, Rfl: 0    Cyanocobalamin (VITAMIN B12 PO), Take by mouth daily , Disp: , Rfl:     dronabinol (MARINOL) 2 5 mg capsule, Take 1 capsule (2 5 mg total) by mouth 2 (two) times a day before meals (Patient taking differently: Take 2 5 mg by mouth 2 (two) times a day before meals As needed ), Disp: 60 capsule, Rfl: 0    enoxaparin (LOVENOX) 80 mg/0 8 mL, Inject 0 8 mL (80 mg total) under the skin every 24 hours, Disp: 30 mL, Rfl: 5    Fiber Adult Gummies 2 g CHEW, Chew 1 tablet in the morning (Patient not taking: Reported on 4/14/2022 ), Disp: 90 tablet, Rfl: 1    folic acid (FOLVITE) 1 mg tablet, Take 1 tablet (1 mg total) by mouth daily, Disp: , Rfl: 0    gemfibrozil (LOPID) 600 mg tablet, TAKE 1 TABLET BY MOUTH EVERY DAY, Disp: 90 tablet, Rfl: 0    lactulose (CHRONULAC) 10 g/15 mL solution, TAKE 45 ML (30 G TOTAL) BY MOUTH 2 (TWO) TIMES A DAY, Disp: 300 mL, Rfl: 2    LORazepam (ATIVAN) 0 5 mg tablet, Take 1 tablet (0 5 mg total) by mouth every 6 (six) hours as needed for anxiety (or nausea), Disp: 36 tablet, Rfl: 1    Multiple Vitamin (MULTIVITAMIN) tablet, Take 1 tablet by mouth daily, Disp: , Rfl:     naloxone (NARCAN) 4 mg/0 1 mL nasal spray, Administer 1 spray into a nostril  If no response after 2-3 minutes, give another dose in the other nostril using a new spray , Disp: 1 each, Rfl: 0    omeprazole (PriLOSEC) 20 mg delayed release capsule, Take 20 mg by mouth daily, Disp: , Rfl:     ondansetron (ZOFRAN) 8 mg tablet, Take 1 tablet (8 mg total) by mouth every 8 (eight) hours as needed for nausea or vomiting, Disp: 30 tablet, Rfl: 1    oxybutynin (DITROPAN XL) 15 MG 24 hr tablet, TAKE 1 TABLET BY MOUTH DAILY AT BEDTIME, Disp: 90 tablet, Rfl: 3    oxyCODONE-acetaminophen (Percocet) 5-325 mg per tablet, Take 1 tablet by mouth every 4 (four) hours as needed for moderate pain Max Daily Amount: 6 tablets, Disp: 30 tablet, Rfl: 0    phenazopyridine (PYRIDIUM) 200 mg tablet, Take 1 tablet (200 mg total) by mouth daily as needed for bladder spasms, Disp: 30 tablet, Rfl: 0    rucaparib (RUBRACA) 300 mg tablet, Take 600 mg by mouth every 12 (twelve) hours Take with or without food  Do not repeat a vomited dose , Disp: , Rfl:     saccharomyces boulardii (FLORASTOR) 250 mg capsule, Take 1 capsule (250 mg total) by mouth 2 (two) times a day, Disp: , Rfl: 0    venlafaxine (EFFEXOR) 75 mg tablet, Take 75 mg by mouth 3 (three) times a day  , Disp: , Rfl:   No current facility-administered medications for this visit      Facility-Administered Medications Ordered in Other Visits:     INV atezolizumab (INVESTIGATIONAL) 1,200 mg in sodium chloride 0 9 % 250 mL infusion, 1,200 mg, Intravenous, Once, Kendell Devine MD    INV bevacizumab (INVESTIGATIONAL) 878 mg in sodium chloride 0 9 % 64 88 mL IVPB, 878 mg, Intravenous, Once, Kendell Devine, MD    sodium chloride 0 9 % infusion, 20 mL/hr, Intravenous, Continuous, Koko Allred MD    Most Recent Lab Results:  Lab Results   Component Value Date    WBC 5 03 04/15/2022    IRON 92 11/19/2021    TIBC 374 11/19/2021    FERRITIN 1,429 (H) 11/19/2021    CHOLESTEROL 189 04/15/2022    CHOL 183 10/27/2017    TRIG 87 04/15/2022    HDL 72 04/15/2022    LDLCALC 100 04/15/2022    ALT 41 04/15/2022    AST 33 04/15/2022    ALB 3 3 (L) 04/15/2022     10/27/2017     05/12/2017    SODIUM 137 04/15/2022    SODIUM 135 (L) 03/25/2022    K 4 0 04/15/2022    K 3 8 03/25/2022     04/15/2022    BUN 17 04/15/2022    BUN 12 03/25/2022    CREATININE 0 90 04/15/2022    CREATININE 1 04 03/25/2022    EGFR 67 04/15/2022    PHOS 4 4 (H) 04/15/2022    PHOS 3 4 03/25/2022    GLUCOSE 219 (H) 12/19/2017    POCGLU 97 08/18/2020    GLUF 89 04/15/2022    GLUF 86 03/04/2022    GLUC 89 04/15/2022    HGBA1C 4 8 07/07/2021    HGBA1C 5 1 02/03/2020    HGBA1C 6 2 08/28/2019    CALCIUM 9 4 04/15/2022    FOLATE 5 4 10/06/2019    MG 1 9 04/15/2022     Anthropometric Measurements:   Height: 59"  Ht Readings from Last 1 Encounters:   04/18/22 4' 11" (1 499 m)     Wt Readings from Last 20 Encounters:   04/18/22 55 3 kg (122 lb)   04/15/22 55 3 kg (122 lb)   04/14/22 55 3 kg (122 lb)   03/28/22 54 kg (119 lb)   03/25/22 54 6 kg (120 lb 5 9 oz)   03/18/22 56 7 kg (125 lb)   03/16/22 56 7 kg (125 lb)   03/08/22 56 7 kg (125 lb)   03/07/22 56 2 kg (124 lb)   03/04/22 55 1 kg (121 lb 8 oz)   02/24/22 56 kg (123 lb 6 4 oz)   02/17/22 58 5 kg (129 lb)   02/14/22 57 2 kg (126 lb)   02/11/22 58 5 kg (129 lb)   01/25/22 61 5 kg (135 lb 9 6 oz)   01/24/22 60 3 kg (133 lb)   01/21/22 61 2 kg (134 lb 14 7 oz)   01/21/22 61 2 kg (135 lb)   01/11/22 60 3 kg (133 lb)   01/03/22 61 kg (134 lb 8 oz)     Weight Hx:  · Usual Weight: 270# before RNYGB in 2001; lowest post-op wt: 200#; wt typically fluctuates between 215-230# (prior to wt loss)  · Varian: (2/25/20) 185 6#, (3/3/20) 188 6#, (3/10/20) 187 2#, (3/19/20) 186 2#, (3/24/20) 187#, (3/31/20) 185 4#, (4/10/20) 184  4#   · Home Scale Wts: (2/19/20) 185#, (8/3/20) 194#    Oncology Nutrition-Anthropometrics      Nutrition from 4/18/2022 in [facility] Nutrition from 3/28/2022 in [facility]   Patient age (years): 61 years 61 years   Patient (female) height (in): 61 in 61 in   Current Weight to be used for anthropometric calculations (lbs) 122 lbs 119 lbs   Current Weight to be used for anthropometric calculations (kg) 55 5 kg 54 1 kg   BMI: 24 6 24   IBW female: 95 lbs 95 lbs   IBW (kg) female: 43 2 kg 43 2 kg   IBW % (female) 128 4 % 125 3 %   Adjusted BW (female): 101 8 lbs 101 lbs   Adjusted BW kg (female): 46 3 kg 45 9 kg   % weight change after 1 month: -2 4 % -3 6 %   Weight change after 1 month (lbs) -3 lbs -4 4 lbs   % weight change after 3 months: -9 6 % -11 9 %   Weight change after 3 months (lbs) -13 lbs -16 lbs   % weight change after 6 months: -- -22 %   Weight change after 6 months (lbs) -- -33 5 lbs   % weight change after 1 year: -33 % -36 4 %         Nutrition-Focused Physical Findings:  severe muscle depletion ([place]) - hollowing/scooping/depression and severe body fat depletion (Or[place]) - hollowing/depression/dark circles    Food/Nutrition-Related History & Client/Social History:    Current Nutrition Impact Symptoms:   [] Nausea  [x] Reduced Appetite - improved [] Acid Reflux   [] Vomiting  [x] Unintended Wt Loss - ongoing, significant, undesirable  +wt gain over the past 2-3 weeks [x] Malabsorption - S/p RNYGB in 2001   [] Diarrhea  [] Unintended Wt Gain  [] Dumping Syndrome   [x] Constipation - improved, +BM this AM  -forgets to try apple prune sauce (mixture has been successful for her in the past)  -On bowel protocol [] Thick Mucous/Secretions  [] Abdominal Pain    [] Dysgeusia (Altered Taste) [x] Xerostomia (Dry Mouth) - comes and goes  -uses bs/sw rinses and Biotene [] Gas    [] Dysosmia (Altered Smell)  [] Thrush  [] Difficulty Chewing    [x] Oral Mucositis (Sore Mouth) - +mouth sores  -using biotene products + bs/sw rinses [x] Fatigue - improved [x] Pain: pubic area, limits po intake   [] Odynophagia   [] Esophagitis  [] Other:    [] Dysphagia [] Early Satiety  [] No Problems Eating      Food Allergies & Intolerances: yes: had skin patch testing which showed allergy to strawberries, but says she is able to eat strawberries without any side effects    Current Diet: Small Frequent Meals  Current Nutrition Intake: Increased since last visit   Appetite: Good   Nutrition Route: PO   Oral Care: Biotene toothpaste, rinse and spray, bs/sw rinses  Activity level: Sedentary  Limited by pain and medical condition  Usual Day Diet Recall: keeps a food journal, helps with planning and accountability  Breakfast: 1 Katey Nathan with butter and jelly  Snack: 1 Profit wafer  Lunch: soup (panera chicken with rice or Well Yes), sometimes crackers (eats slowly over time)  Snack: pretzels or shrimp cocktail (4-5 pc or more) or 1/4 cup almonds  Dinner: usually has a small portion of meat with potato (not as many veggies lately) or 1/2 chicken salad sandwich  Snack: almonds or 1 Profit wafer    Beverages: water (16 9 oz x2), iced tea (32 oz x1, occasionally more), Premier protein shake (11 oz x1), Fairlife milk (occasionally with cereal)   -Does not drink coffee or alcohol   -Does not separate fluids from solids, does not have discomfort while eating and drinking at the same time  Supplements:    Premier Protein Shake (11 oz, 160 kcal, 30 g pro) - QD, finds it sweet so adds a little water, discussed adding milk instead   · Unjury protein powder sometimes added to soup  · Was gong to try Ensure Clear but went for Premier instead d/t higher protein content   No recent CIB    Has not tried Bonjoy (threw it away after sat in freezer)   Does not like Ensure or Boost    Oncology Nutrition-Estimated Needs      Nutrition from 3/28/2022 in [place] [address] [facility] Nutrition from 10/11/2021 in [place] [address] [facility]   Weight type used Actual weight Adjusted weight   Weight in kilograms (kg) used for estimated needs 54 1 kg 49 5 kg   Energy needs formula:  35-40 kcal/kg 35-40 kcal/kg   Energy needs based on 35 kcal/k kcal 1733 kcal   Energy needs based on 40 kcal/k kcal 1980 kcal   Protein needs formula: 1 5-2 g/kg 1 5-2 g/kg   Protein needs based on 1 5 g/kg 81 g 74 g   Protein needs based on 2 g/kg 108 g 99 g   Fluid needs formula: 30-35 mL/kg 30-35 mL/kg   Fluid needs based on 30 mL/kg 1623 mL 1485 mL   Fluid needs in ounces 55 oz 50 oz   Fluid needs based on 35 mL/kg 1894 mL 1733 mL   Fluid needs in ounces 64 oz 59 oz        Discussion & Intervention:    Ragini was evaluated today for an RD follow up regarding wt loss and pt request for dietitian involvement throughout tx  Ragini is currently undergoing tx for endometrial cancer  Ragini is doing well today  She is happy with the fact that she has gained a little bit of wt over the past couple weeks, however, her wt is still down overall compared to 1 month ago  Her appetite, constipation, and fatigue are improving  She has ongoing mouth sores but is managing with diet and oral care  She found that she likes Premier shakes so she has been drinking these  She continues to keep a food journal which she finds helpful for planning and accountability  Ragini has recognized that she does best with self-directed goals and not being told what to do  Goals and plan of care were selected by pt today  Supportive listening and encouragement were provided     Reviewed 24 hour recall, which revealed an suboptimal po intake, and discussed ways to increase kcal, protein, and fluid intakes and optimize nutrient intake  Also reviewed the importance of wt management throughout the tx process and the role of a high kcal/ high protein diet in managing wt and overall health  Based on today's assessment, discussion included: MNT for: wt loss, sore mouth, malnutrition, bowel management, practicing proper oral care, a high kcal/protein diet & food choices to include at all meals & snacks (Examples of high kcal foods: cheese, full-fat dairy products, nut butter, plant-based fats, coconut oil/milk, avocado, butter, cream soup, etc  Examples of high protein foods: eggs, chicken, fish, beans/legumes, nuts/nut butters, bone broth, etc ) , fortifying foods for added kcal and protein (examples include: adding cheese to foods such as eggs, mashed potatoes, casseroles, etc ; Making oatmeal with whole milk rather than water; Making fortified mashed potatoes with cream, butter, dry milk powder, plain [place] yogurt, and cheese ), adequate hydration & fluid choices, sipping on calorie containing beverages (examples include: adding whole milk or cream to coffee, oral nutrition supplements, juice, electrolyte replacement beverages, milk, etc ), eating smaller more frequent meals every 2-3 hours (5-6 small meals/day), having consistent and planned snacks between meals, eating when feeling most hungry, choosing higher kcal/protein oral nutrition supplements and increasing oral nutrition supplements and recipe suggestions/resources, trying new recipes, & cooking at home more often     Moving forward, Ragini was encouraged to increase kcal, protein, and fluid intakes  Materials Provided: not applicable  All questions and concerns addressed during todays visit  Ragini has RD contact information  Nutrition Diagnosis:  Inadequate Energy Intake related to physiological causes, disease state and treatment related issues as evidenced by food recall, wt loss and discussion with pt and/or family    Increased Nutrient Needs (kcal & pro) related to increased demand for nutrients and disease state as evidenced by cancer dx and pt undergoing tx for cancer  Altered GI Function related to alteration in GI tract motility and integrity as evidenced by Constipation and Oral Mucositis (Sore Mouth)  Patient has clinical indicators (or ASPEN criteria) consistent with severe protein-calorie malnutrition in the context of Chronic Illness as evidenced by >7 5% wt loss in 3 months, >/=20% wt loss in 12 months, </=75% energy intake vs  Estimated needs for >/=1 month, severe muscle depletion ([place]) - hollowing/scooping/depression and severe body fat depletion ([place]) - hollowing/depression/dark circles  Monitoring & Evaluation:   Goals:  · weight maintenance/stabilization  · adequate nutrition impact symptom management  · pt to meet >/=75% estimated nutrition needs daily     · Progress Towards Goals: Progressing    Nutrition Rx & Recommendations:  · Diet: High Calorie, High Protein (for high calorie foods see pages 52-53, and for high protein foods see pages 49-51 in your Eating Hints book)  · Small, frequent meals/snacks may be easier to tolerate than 3 large daily meals  Aim for 5-6 small meals per day (every 2-3 hours)  · Include protein at all meals/snacks  · Avoid spicy, acidic, sharp/hard/crunchy foods & carbonated beverages as needed  · Follow proper oral care; Try baking soda/salt water rinse recipe (mix 3/4 tsp salt + 1 tsp baking soda + 1 qt water; rinse with plain water after using) in Eating Hints book (pg 18)  Brush your teeth before/after meals & before bed    · For appetite loss: try powdered or liquid nutrition supplements; eating by the clock every 2-3 hours; set a timer to remind yourself to eat, keep snacks nearby; add extra protein & calories to your diet; drink liquids in between meals, choose liquids that add calories; eat a bedtime snack; eat soft, cool or frozen foods; eat a larger meal when you feel well & rested; only sip small amounts of liquids during meals (see pages 10-12 in your Eating Hints book)  · For weight loss: monitor your weight at home at least 2x/week, record your weight, start by adding 250-500 extra calories per day, eat 5-6 small scheduled meals every 2-3 hrs, choose foods that are high in protein and calories (see pages 49-53 in your Eating Hints book), drink liquids with calories for example: milkshakes/smoothies/juice/soup/whole milk/chocolate milk, cook with protein fortified milk (see recipe on page 36 in your Eating Hints book), consider ready-to-drink oral nutrition supplements such as Ensure Plus, Ensure Enlive, Boost Plus, or Boost Very High Calorie, avoid "diet" and "light" foods when possible, avoid drinking too much with meals, contact your dietitian with any continued weight loss over the course of 1 week  For more info see pages 35-37 in your Eating Hints book  · For constipation: drink plenty of fluids (>64 oz/day); drink hot liquids; eat high-fiber foods as tolerated (whole grains, beans, peas, nuts, seeds, fruits, vegetables, etc); increase physical activity as tolerated  Avoid increasing fiber intake too quickly, add fiber into your diet slowly; keep a record of your bowel movements (see pages 13-14 in you Eating Hints book)  · For sore mouth/throat: choose foods that are easy to chew/swallow; cook foods until they are soft/tender; moisten & soften foods (gravy/sauce/broth/yogurt); cut food into small pieces; drink with a straw (as tolerated); eat with a very small spoon; eat cold or room temperature food; suck on ice chips; Avoid citrus, spicy foods, tomatoes/ketchup, salty foods, raw vegetables, sharp/crunchy/hard foods & alcohol (see pages 23-28 in your Eating Hints book)    · For dry mouth: sip water throughout the day; try very sweet (or tart, as tolerated) drinks; chew gum or suck on hard candy, popsicles, & ice chips; eat easy to swallow foods (such as pureed cooked foods or soups); moisten food with sauce/gravy/salad dressing; do not drink beer, wine, or any type of alcohol; keep lips moist with lip balm; avoid tobacco products & second-hand smoke; try Biotene as needed (see pages 17-18 in your Eating Hints book)  Follow proper oral care; Try baking soda/salt water rinse recipe (mix 3/4 tsp salt + 1 tsp baking soda + 1 qt water; rinse with pain water after using) in Eating Hints book (pg 18)  Brush your teeth before/after meals & before bed  · Weigh yourself regularly  If you notice weight loss, make an effort to increase your daily food/calorie intake  If you continue to notice loss after these efforts, reach out to your dietitian to establish a plan to stabilize weight     · Snack ideas: [place] yogurt with fruit, pudding, veggies with humus OR try greek yogurt based vegetable dip, peanut butter, grapes and cheese, fruit with a cheese stick , cottage cheese, soup, unjury, protein shakes/smoothies  · Continue to keep a food journal as long as it is helpful  · Re-start apple prune sauce for constipation  · Try to eat something every 1 hr, set alarm on your phone as a reminder    Patient choosen goals:  · Nutrition Supplements:    · Increase Premier Protein Shake to 2X/day - try mixing with Fairlife milk or putting it over ice to reduce sweetness  · Continue adding unflavored Unjury to foods such as soup  · Try to increase your fruit and vegetable intake, add 1-2 servings per day to start    Follow Up Plan: 5/9/22 during infusion   Recommend Referral to Other Providers: none at this time

## 2022-04-14 ENCOUNTER — APPOINTMENT (OUTPATIENT)
Dept: RADIOLOGY | Facility: MEDICAL CENTER | Age: 67
End: 2022-04-14
Payer: COMMERCIAL

## 2022-04-14 ENCOUNTER — OFFICE VISIT (OUTPATIENT)
Dept: FAMILY MEDICINE CLINIC | Facility: CLINIC | Age: 67
End: 2022-04-14
Payer: COMMERCIAL

## 2022-04-14 VITALS
WEIGHT: 122 LBS | TEMPERATURE: 98 F | OXYGEN SATURATION: 98 % | HEIGHT: 59 IN | BODY MASS INDEX: 24.6 KG/M2 | SYSTOLIC BLOOD PRESSURE: 94 MMHG | HEART RATE: 71 BPM | DIASTOLIC BLOOD PRESSURE: 68 MMHG | RESPIRATION RATE: 16 BRPM

## 2022-04-14 DIAGNOSIS — F33.9 DEPRESSION, RECURRENT (HCC): ICD-10-CM

## 2022-04-14 DIAGNOSIS — F11.20 CONTINUOUS OPIOID DEPENDENCE (HCC): ICD-10-CM

## 2022-04-14 DIAGNOSIS — C54.1 ENDOMETRIAL CANCER (HCC): Primary | ICD-10-CM

## 2022-04-14 DIAGNOSIS — R07.81 RIB PAIN ON RIGHT SIDE: Primary | ICD-10-CM

## 2022-04-14 DIAGNOSIS — Z12.11 SCREEN FOR COLON CANCER: ICD-10-CM

## 2022-04-14 DIAGNOSIS — R07.81 RIB PAIN ON RIGHT SIDE: ICD-10-CM

## 2022-04-14 PROCEDURE — 3078F DIAST BP <80 MM HG: CPT | Performed by: INTERNAL MEDICINE

## 2022-04-14 PROCEDURE — 99214 OFFICE O/P EST MOD 30 MIN: CPT | Performed by: INTERNAL MEDICINE

## 2022-04-14 PROCEDURE — 71101 X-RAY EXAM UNILAT RIBS/CHEST: CPT

## 2022-04-14 RX ORDER — CLOTRIMAZOLE AND BETAMETHASONE DIPROPIONATE 10; .64 MG/G; MG/G
CREAM TOPICAL 2 TIMES DAILY
Qty: 30 G | Refills: 0 | Status: SHIPPED | OUTPATIENT
Start: 2022-04-14

## 2022-04-14 RX ORDER — OXYCODONE HYDROCHLORIDE AND ACETAMINOPHEN 5; 325 MG/1; MG/1
1 TABLET ORAL EVERY 4 HOURS PRN
Qty: 30 TABLET | Refills: 0 | Status: SHIPPED | OUTPATIENT
Start: 2022-04-14

## 2022-04-14 NOTE — PROGRESS NOTES
Assessment/Plan:    Problem List Items Addressed This Visit        Digestive    Drug induced constipation     Intermittent  Managed with meds             Genitourinary    Endometrial cancer ([facility] ) - Primary (Chronic)     61year-old with recurrent stage IB endometrial cancer who is receiving treatment per the [facility] trial presents for pre cycle 24 visit  CBC, CMP, mag reviewed and all hematologic parameters support continued treatment    Tolerating treatment with minimal toxicity  Proceed as planned  CT planned for next week            Obstruction of right ureter     Pending CT and urology follow up   Possible internalization of stent if improved  CHIEF COMPLAINT: pre chemo      Problem:  Cancer Staging  Endometrial cancer [facility])  Staging form: Corpus Uteri - Carcinoma, AJCC 7th Edition  - Clinical stage from 12/19/2017: FIGO Stage IB (T1b, N0, M0) - Signed by Yoandy Turcios MD on 2/12/2018        Previous therapy:  Oncology History   Endometrial cancer ([facility] [address] )   11/17/2017 Initial Diagnosis    [facility] )     11/17/2017 Biopsy    ENDOMETRIAL BIOPSY: WELL DIFFERENTIATED ENDOMETRIAL ADENOCARCINOMA (FIGO I) WITH FOCALMUCINOUS FEATURES  Part B: ENDOCERVICAL POLYP:BENIGN ENDOCERVICAL      12/19/2017 Surgery    Robotic assisted total laparoscopic hysterectomy with bilateral salpingo-oophorectomy and sentinel bilateral pelvic lymph node dissection  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 (4 4 x 3 2 cm tumor, 9 4/15 4mm invasion, NO LVSI, washings revealed atypical cellular changes)     12/19/2017 Genetic Testing    Morrison testing negative     6/28/2019 Biopsy    A  Breast, Right, US BX Right Breast 1000 4 cmfn:  - Benign breast tissue with focal histiocytic aggregate  See comment   - Negative for atypia and in-situ or invasive carcinoma       7/8/2019 Recurrence    Presented with right lower extremity DVT and CT demonstrating right pelvic sidewall mass with venous, ureteral and nerve compression causing significant neuropathic pain  Biopsy:  Lymph Node, Right pelvic lymph node x3:  - High-grade adenocarcinoma  7/30/2019 - 1/6/2020 Chemotherapy    Taxol 175 mg/m2 and carboplatin AUC 6 every 21 days  Dose was reduced to taxol 135 mg/m2 and carboplatin AUC 5  Completed 6 cycles  Treatment protracted due to multiple hospital admissions  2/24/2020 - 4/13/2020 Radiation    adjuvant external beam radiation therapy to the whole pelvis to 4500 cGy followed by boost to gross disease of an additional 2200 cGy     11/23/2020 Progression    New necrotic adenopathy in the retroperitoneum on CT      12/21/2020 -  Chemotherapy    Began chemotherapy with rucaparib, atezolizumab, and bevacizumab as per [facility] clinical trial            Patient ID: Nhan Escamilla is a 61 y o  female  46-year-old with recurrent stage IB endometrial cancer who is receiving treatment per the [facility] trial presents for pre cycle 24 visit  Last imaging 2/2022 with stable retroperitoneal disease and no new lesions  Patient reports overall feeling well  She continues to have a PCN to drainage with follow-up with Urology scheduled for May  She has intermittent constipation managed with medications  She denies any nausea/vomiting, shortness of breath  Her performance status is a 1          The following portions of the patient's history were reviewed and updated as appropriate: allergies, current medications, past family history, past medical history, past social history, past surgical history and problem list     Review of Systems   Constitutional: Positive for fatigue  Negative for appetite change, chills and fever  Respiratory: Negative for chest tightness and shortness of breath  Gastrointestinal: Positive for constipation  Negative for abdominal distention, abdominal pain, diarrhea and nausea     Genitourinary: Negative for difficulty urinating, flank pain, frequency, urgency, vaginal bleeding, vaginal discharge and vaginal pain  Musculoskeletal: Negative for back pain, joint swelling and myalgias  Skin: Negative for rash  Neurological: Negative for dizziness, light-headedness, numbness and headaches  Current Outpatient Medications   Medication Sig Dispense Refill    acetaminophen-codeine (TYLENOL #3) 300-30 mg per tablet Take 1 tablet by mouth every 6 (six) hours as needed for moderate pain 20 tablet 0    ARIPiprazole (ABILIFY) 10 mg tablet Take 20 mg by mouth daily        aspirin (ECOTRIN LOW STRENGTH) 81 mg EC tablet Take 81 mg by mouth daily      Calcium-Magnesium-Vitamin D (CALCIUM 500 PO) Take by mouth daily       cholecalciferol (VITAMIN D3) 1,000 units tablet Take 1,000 Units by mouth daily      clotrimazole-betamethasone (LOTRISONE) 1-0 05 % cream Apply topically 2 (two) times a day 30 g 0    Cyanocobalamin (VITAMIN B12 PO) Take by mouth daily       dronabinol (MARINOL) 2 5 mg capsule Take 1 capsule (2 5 mg total) by mouth 2 (two) times a day before meals (Patient taking differently: Take 2 5 mg by mouth 2 (two) times a day before meals As needed ) 60 capsule 0    enoxaparin (LOVENOX) 80 mg/0 8 mL Inject 0 8 mL (80 mg total) under the skin every 24 hours 30 mL 5    Fiber Adult Gummies 2 g CHEW Chew 1 tablet in the morning (Patient not taking: Reported on 4/14/2022 ) 90 tablet 1    folic acid (FOLVITE) 1 mg tablet Take 1 tablet (1 mg total) by mouth daily  0    gemfibrozil (LOPID) 600 mg tablet TAKE 1 TABLET BY MOUTH EVERY DAY 90 tablet 0    lactulose (CHRONULAC) 10 g/15 mL solution TAKE 45 ML (30 G TOTAL) BY MOUTH 2 (TWO) TIMES A  mL 2    LORazepam (ATIVAN) 0 5 mg tablet Take 1 tablet (0 5 mg total) by mouth every 6 (six) hours as needed for anxiety (or nausea) 36 tablet 1    Multiple Vitamin (MULTIVITAMIN) tablet Take 1 tablet by mouth daily      naloxone (NARCAN) 4 mg/0 1 mL nasal spray Administer 1 spray into a nostril   If no response after 2-3 minutes, give another dose in the other nostril using a new spray  1 each 0    omeprazole (PriLOSEC) 20 mg delayed release capsule Take 20 mg by mouth daily      ondansetron (ZOFRAN) 8 mg tablet Take 1 tablet (8 mg total) by mouth every 8 (eight) hours as needed for nausea or vomiting 30 tablet 1    oxybutynin (DITROPAN XL) 15 MG 24 hr tablet TAKE 1 TABLET BY MOUTH DAILY AT BEDTIME 90 tablet 3    oxyCODONE-acetaminophen (Percocet) 5-325 mg per tablet Take 1 tablet by mouth every 4 (four) hours as needed for moderate pain Max Daily Amount: 6 tablets 30 tablet 0    phenazopyridine (PYRIDIUM) 200 mg tablet Take 1 tablet (200 mg total) by mouth daily as needed for bladder spasms 30 tablet 0    rucaparib (RUBRACA) 300 mg tablet Take 600 mg by mouth every 12 (twelve) hours Take with or without food  Do not repeat a vomited dose   saccharomyces boulardii (FLORASTOR) 250 mg capsule Take 1 capsule (250 mg total) by mouth 2 (two) times a day  0    venlafaxine (EFFEXOR) 75 mg tablet Take 75 mg by mouth 3 (three) times a day         No current facility-administered medications for this visit  Objective:    not currently breastfeeding  There is no height or weight on file to calculate BMI  There is no height or weight on file to calculate BSA  Physical Exam  HENT:      Head: Normocephalic and atraumatic  Nose: Nose normal    Cardiovascular:      Rate and Rhythm: Normal rate and regular rhythm  Pulmonary:      Effort: Pulmonary effort is normal    Abdominal:      General: There is no distension  Palpations: Abdomen is soft  There is no mass  Genitourinary:     Comments: defer  Musculoskeletal:         General: No swelling  Normal range of motion  Cervical back: Normal range of motion  Skin:     General: Skin is warm and dry  Neurological:      General: No focal deficit present  Mental Status: She is alert     Psychiatric:         Mood and Affect: Mood normal            Lab Results   Component Value Date     10/27/2017    K 3 8 03/25/2022     03/25/2022    CO2 22 03/25/2022    BUN 12 03/25/2022    CREATININE 1 04 03/25/2022    GLUCOSE 219 (H) 12/19/2017    GLUF 86 03/04/2022    CALCIUM 9 5 03/25/2022    CORRECTEDCA 10 0 03/25/2022    AST 34 03/25/2022    ALT 35 03/25/2022    ALKPHOS 133 (H) 03/25/2022    PROT 6 9 10/27/2017    BILITOT 0 3 10/27/2017    EGFR 56 03/25/2022     Lab Results   Component Value Date    WBC 4 35 03/25/2022    HGB 9 5 (L) 03/25/2022    HCT 27 9 (L) 03/25/2022    MCV 96 03/25/2022     03/25/2022     Lab Results   Component Value Date    NEUTROABS 3 18 03/25/2022        Trend:  No results found for:

## 2022-04-14 NOTE — ASSESSMENT & PLAN NOTE
44-year-old with recurrent stage IB endometrial cancer who is receiving treatment per the [facility] trial presents for pre cycle 24 visit  CBC, CMP, mag reviewed and all hematologic parameters support continued treatment    Tolerating treatment with minimal toxicity     Proceed as planned  CT planned for next week

## 2022-04-14 NOTE — PROGRESS NOTES
Pt developed sever rt rib(low) pain under her breast   She said she wore an underwire bra and felt it stabbed her when she bent over  Assessment/Plan:         Diagnoses and all orders for this visit:    Rib pain on right side  Comments:  r/o mets  /  add lotrisone due to location  Orders:  -     XR ribs right w pa chest min 3 views; Future  -     oxyCODONE-acetaminophen (Percocet) 5-325 mg per tablet; Take 1 tablet by mouth every 4 (four) hours as needed for moderate pain Max Daily Amount: 6 tablets  -     clotrimazole-betamethasone (LOTRISONE) 1-0 05 % cream; Apply topically 2 (two) times a day    Screen for colon cancer  -     Occult Blood, Fecal Immunochemical; Future    Continuous opioid dependence (HCC)    Depression, recurrent (HCC)          Subjective:      Patient ID: Justine Rebolledo is a 61 y o  female  Patient develops severe right lower rib pain under her right breast   She said she wore an underwire bra and felt it stabbed her when she bent over  Denies cut to her skin or erythema  Positive tenderness with palpation of ribs  +pain with deep inspiration      The following portions of the patient's history were reviewed and updated as appropriate: She  has a past medical history of Anemia, Chemotherapy follow-up examination, Depression, Endometrial cancer ([facility] ) (12/2017), Hyperglycemia, Hyperlipidemia, Hypertension, and Obesity    She   Patient Active Problem List    Diagnosis Date Noted    Continuous opioid dependence ([facility] ) 04/14/2022    Depression, recurrent ([facility] ) 04/14/2022    Cachexia ([facility] ) 10/06/2021    Chronic UTI 08/06/2021    Secondary malignant neoplasm of other specified sites [facility]) 07/07/2021    Abnormal urine findings 06/25/2021    Pubic bone pain 05/14/2021    Closed nondisplaced fracture of right pubis with delayed healing 05/14/2021    Exertional dyspnea 04/02/2021    Need for follow-up by  03/12/2021    Dental disorder 01/29/2021    Drug induced constipation 01/08/2021    Osteoporosis 11/30/2020    Pathological fracture due to osteoporosis 11/25/2020    Bilateral piriformis syndrome     Chronic pain syndrome     Myofascial pain syndrome     Nephrostomy status ([facility] ) 10/14/2020    Overactive bladder 08/06/2020    Acute left-sided low back pain without sciatica 08/06/2020    Obstruction of right ureter 01/03/2020    Hypomagnesemia 10/18/2019    Ambulatory dysfunction 10/11/2019    Moderate protein-calorie malnutrition (HCC) 10/03/2019    Bilateral lower extremity edema 09/30/2019    Generalized weakness 09/29/2019    Hypokalemia 09/19/2019    Hyponatremia 09/19/2019    Chemotherapy-induced nausea 09/18/2019    Anemia 09/06/2019    Chemotherapy induced neutropenia ([facility] ) 08/30/2019    Cancer related pain 07/19/2019    Encounter for central line care 07/16/2019    Acute deep vein thrombosis (DVT) of proximal vein of lower extremity ([facility] ) 06/19/2019    Breast cancer screening 06/19/2019    Benign essential hypertension 12/19/2017    Endometrial cancer ([facility] ) 11/29/2017    Dyslipidemia 04/01/2014    Major depression, chronic 10/07/2013     She  has a past surgical history that includes Abdominal surgery; Colonoscopy; pr lap, radical hyst w/ tube&ov, node bx (N/A, 12/19/2017); pr lap,diagnostic abdomen (N/A, 12/19/2017); Cholecystectomy; Gastric bypass; Tonsillectomy; Hysterectomy (Bilateral); Oophorectomy (Bilateral); US guided breast biopsy right complete (Right, 6/28/2019); CT needle biopsy lymph node (7/8/2019); IR port placement (7/26/2019); IR nephrostomy tube placement (7/26/2019); IR port removal (9/20/2019); IR PICC line (9/27/2019); pr insj tunneled ctr vad w/subq port age 13 yr/> (Left, 11/12/2019); FL guided central venous access device insertion (11/12/2019); IR nephroureteral stent check/change/reposition (4/6/2021); IR nephroureteral stent check/change/reposition (6/4/2021);  IR nephroureteral stent check/change/reposition (9/3/2021); IR nephroureteral stent check/change/reposition (12/7/2021); and IR nephroureteral stent check/change/reposition (3/8/2022)  Her family history includes Bone cancer in her maternal grandfather; Lymphoma in her father; No Known Problems in her brother, brother, maternal aunt, maternal aunt, maternal aunt, maternal aunt, maternal grandmother, paternal aunt, paternal aunt, paternal aunt, paternal aunt, paternal grandfather, and sister; Other in her mother; Ovarian cancer (age of onset: 48) in her mother; Uterine cancer in her paternal grandmother  She  reports that she has never smoked  She has never used smokeless tobacco  She reports previous alcohol use  She reports that she does not use drugs    Current Outpatient Medications   Medication Sig Dispense Refill    acetaminophen-codeine (TYLENOL #3) 300-30 mg per tablet Take 1 tablet by mouth every 6 (six) hours as needed for moderate pain 20 tablet 0    ARIPiprazole (ABILIFY) 10 mg tablet Take 20 mg by mouth daily        aspirin (ECOTRIN LOW STRENGTH) 81 mg EC tablet Take 81 mg by mouth daily      Calcium-Magnesium-Vitamin D (CALCIUM 500 PO) Take by mouth daily       cholecalciferol (VITAMIN D3) 1,000 units tablet Take 1,000 Units by mouth daily      Cyanocobalamin (VITAMIN B12 PO) Take by mouth daily       dronabinol (MARINOL) 2 5 mg capsule Take 1 capsule (2 5 mg total) by mouth 2 (two) times a day before meals (Patient taking differently: Take 2 5 mg by mouth 2 (two) times a day before meals As needed ) 60 capsule 0    enoxaparin (LOVENOX) 80 mg/0 8 mL Inject 0 8 mL (80 mg total) under the skin every 24 hours 30 mL 5    folic acid (FOLVITE) 1 mg tablet Take 1 tablet (1 mg total) by mouth daily  0    gemfibrozil (LOPID) 600 mg tablet TAKE 1 TABLET BY MOUTH EVERY DAY 90 tablet 0    lactulose (CHRONULAC) 10 g/15 mL solution TAKE 45 ML (30 G TOTAL) BY MOUTH 2 (TWO) TIMES A  mL 2    LORazepam (ATIVAN) 0 5 mg tablet Take 1 tablet (0 5 mg total) by mouth every 6 (six) hours as needed for anxiety (or nausea) 36 tablet 1    Multiple Vitamin (MULTIVITAMIN) tablet Take 1 tablet by mouth daily      naloxone (NARCAN) 4 mg/0 1 mL nasal spray Administer 1 spray into a nostril  If no response after 2-3 minutes, give another dose in the other nostril using a new spray  1 each 0    omeprazole (PriLOSEC) 20 mg delayed release capsule Take 20 mg by mouth daily      ondansetron (ZOFRAN) 8 mg tablet Take 1 tablet (8 mg total) by mouth every 8 (eight) hours as needed for nausea or vomiting 30 tablet 1    oxybutynin (DITROPAN XL) 15 MG 24 hr tablet TAKE 1 TABLET BY MOUTH DAILY AT BEDTIME 90 tablet 3    phenazopyridine (PYRIDIUM) 200 mg tablet Take 1 tablet (200 mg total) by mouth daily as needed for bladder spasms 30 tablet 0    rucaparib (RUBRACA) 300 mg tablet Take 600 mg by mouth every 12 (twelve) hours Take with or without food  Do not repeat a vomited dose   saccharomyces boulardii (FLORASTOR) 250 mg capsule Take 1 capsule (250 mg total) by mouth 2 (two) times a day  0    venlafaxine (EFFEXOR) 75 mg tablet Take 75 mg by mouth 3 (three) times a day        clotrimazole-betamethasone (LOTRISONE) 1-0 05 % cream Apply topically 2 (two) times a day 30 g 0    Fiber Adult Gummies 2 g CHEW Chew 1 tablet in the morning (Patient not taking: Reported on 4/14/2022 ) 90 tablet 1    oxyCODONE-acetaminophen (Percocet) 5-325 mg per tablet Take 1 tablet by mouth every 4 (four) hours as needed for moderate pain Max Daily Amount: 6 tablets 30 tablet 0     No current facility-administered medications for this visit       Current Outpatient Medications on File Prior to Visit   Medication Sig    acetaminophen-codeine (TYLENOL #3) 300-30 mg per tablet Take 1 tablet by mouth every 6 (six) hours as needed for moderate pain    ARIPiprazole (ABILIFY) 10 mg tablet Take 20 mg by mouth daily      aspirin (ECOTRIN LOW STRENGTH) 81 mg EC tablet Take 81 mg by mouth daily    Calcium-Magnesium-Vitamin D (CALCIUM 500 PO) Take by mouth daily     cholecalciferol (VITAMIN D3) 1,000 units tablet Take 1,000 Units by mouth daily    Cyanocobalamin (VITAMIN B12 PO) Take by mouth daily     dronabinol (MARINOL) 2 5 mg capsule Take 1 capsule (2 5 mg total) by mouth 2 (two) times a day before meals (Patient taking differently: Take 2 5 mg by mouth 2 (two) times a day before meals As needed )    enoxaparin (LOVENOX) 80 mg/0 8 mL Inject 0 8 mL (80 mg total) under the skin every 24 hours    folic acid (FOLVITE) 1 mg tablet Take 1 tablet (1 mg total) by mouth daily    gemfibrozil (LOPID) 600 mg tablet TAKE 1 TABLET BY MOUTH EVERY DAY    lactulose (CHRONULAC) 10 g/15 mL solution TAKE 45 ML (30 G TOTAL) BY MOUTH 2 (TWO) TIMES A DAY    LORazepam (ATIVAN) 0 5 mg tablet Take 1 tablet (0 5 mg total) by mouth every 6 (six) hours as needed for anxiety (or nausea)    Multiple Vitamin (MULTIVITAMIN) tablet Take 1 tablet by mouth daily    naloxone (NARCAN) 4 mg/0 1 mL nasal spray Administer 1 spray into a nostril  If no response after 2-3 minutes, give another dose in the other nostril using a new spray   omeprazole (PriLOSEC) 20 mg delayed release capsule Take 20 mg by mouth daily    ondansetron (ZOFRAN) 8 mg tablet Take 1 tablet (8 mg total) by mouth every 8 (eight) hours as needed for nausea or vomiting    oxybutynin (DITROPAN XL) 15 MG 24 hr tablet TAKE 1 TABLET BY MOUTH DAILY AT BEDTIME    phenazopyridine (PYRIDIUM) 200 mg tablet Take 1 tablet (200 mg total) by mouth daily as needed for bladder spasms    rucaparib (RUBRACA) 300 mg tablet Take 600 mg by mouth every 12 (twelve) hours Take with or without food  Do not repeat a vomited dose      saccharomyces boulardii (FLORASTOR) 250 mg capsule Take 1 capsule (250 mg total) by mouth 2 (two) times a day    venlafaxine (EFFEXOR) 75 mg tablet Take 75 mg by mouth 3 (three) times a day      Fiber Adult Gummies 2 g CHEW Chew 1 tablet in the morning (Patient not taking: Reported on 4/14/2022 )     No current facility-administered medications on file prior to visit  She is allergic to cephalosporins       Review of Systems   Constitutional: Negative for chills and fever  HENT: Negative  Respiratory: Negative  Cardiovascular: Negative  Musculoskeletal:        Rib pain         Objective:      BP 94/68 (BP Location: Right arm, Patient Position: Sitting, Cuff Size: Standard)   Pulse 71   Temp 98 °F (36 7 °C) (Temporal)   Resp 16   Ht 4' 11" (1 499 m)   Wt 55 3 kg (122 lb)   LMP  (LMP Unknown)   SpO2 98%   BMI 24 64 kg/m²          Physical Exam  Constitutional:       Appearance: Normal appearance  HENT:      Head: Normocephalic and atraumatic  Right Ear: Tympanic membrane and ear canal normal       Left Ear: Tympanic membrane and ear canal normal    Cardiovascular:      Rate and Rhythm: Normal rate and regular rhythm  Pulmonary:      Effort: Pulmonary effort is normal       Breath sounds: Normal breath sounds  Musculoskeletal:      Cervical back: Neck supple  Comments: Sever pain with light palp rt low ribs anterior   Lymphadenopathy:      Cervical: No cervical adenopathy  Neurological:      Mental Status: She is alert

## 2022-04-15 ENCOUNTER — HOSPITAL ENCOUNTER (OUTPATIENT)
Dept: INFUSION CENTER | Facility: CLINIC | Age: 67
Discharge: HOME | End: 2022-04-15
Payer: COMMERCIAL

## 2022-04-15 ENCOUNTER — OFFICE VISIT (OUTPATIENT)
Dept: GYNECOLOGIC ONCOLOGY | Facility: CLINIC | Age: 67
End: 2022-04-15
Payer: COMMERCIAL

## 2022-04-15 ENCOUNTER — DOCUMENTATION (OUTPATIENT)
Dept: OTHER | Facility: HOSPITAL | Age: 67
End: 2022-04-15

## 2022-04-15 VITALS
TEMPERATURE: 97.1 F | WEIGHT: 122 LBS | RESPIRATION RATE: 16 BRPM | HEIGHT: 59 IN | SYSTOLIC BLOOD PRESSURE: 116 MMHG | BODY MASS INDEX: 24.6 KG/M2 | DIASTOLIC BLOOD PRESSURE: 80 MMHG | OXYGEN SATURATION: 100 % | HEART RATE: 80 BPM

## 2022-04-15 DIAGNOSIS — C54.1 ENDOMETRIAL CANCER (HCC): Primary | Chronic | ICD-10-CM

## 2022-04-15 DIAGNOSIS — E83.42 HYPOMAGNESEMIA: ICD-10-CM

## 2022-04-15 DIAGNOSIS — N13.5 OBSTRUCTION OF RIGHT URETER: ICD-10-CM

## 2022-04-15 DIAGNOSIS — T45.1X5A CHEMOTHERAPY INDUCED NEUTROPENIA (HCC): ICD-10-CM

## 2022-04-15 DIAGNOSIS — E78.5 DYSLIPIDEMIA: ICD-10-CM

## 2022-04-15 DIAGNOSIS — K59.03 DRUG INDUCED CONSTIPATION: ICD-10-CM

## 2022-04-15 DIAGNOSIS — C54.1 ENDOMETRIAL CANCER (HCC): Primary | ICD-10-CM

## 2022-04-15 DIAGNOSIS — M80.00XS OSTEOPOROSIS WITH CURRENT PATHOLOGICAL FRACTURE, UNSPECIFIED OSTEOPOROSIS TYPE, SEQUELA: ICD-10-CM

## 2022-04-15 DIAGNOSIS — D70.1 CHEMOTHERAPY INDUCED NEUTROPENIA (HCC): ICD-10-CM

## 2022-04-15 LAB
ALBUMIN SERPL BCP-MCNC: 3.3 G/DL (ref 3.5–5)
ALP SERPL-CCNC: 130 U/L (ref 46–116)
ALT SERPL W P-5'-P-CCNC: 41 U/L (ref 12–78)
AMYLASE SERPL-CCNC: 34 IU/L (ref 25–115)
ANION GAP SERPL CALCULATED.3IONS-SCNC: 11 MMOL/L (ref 4–13)
APTT PPP: 44 SECONDS (ref 23–37)
AST SERPL W P-5'-P-CCNC: 33 U/L (ref 5–45)
BACTERIA UR QL AUTO: ABNORMAL /HPF
BASOPHILS # BLD AUTO: 0.03 THOUSANDS/ΜL (ref 0–0.1)
BASOPHILS NFR BLD AUTO: 1 % (ref 0–1)
BILIRUB SERPL-MCNC: 0.28 MG/DL (ref 0.2–1)
BILIRUB UR QL STRIP: NEGATIVE
BUN SERPL-MCNC: 17 MG/DL (ref 5–25)
CALCIUM ALBUM COR SERPL-MCNC: 10 MG/DL (ref 8.3–10.1)
CALCIUM SERPL-MCNC: 9.4 MG/DL (ref 8.3–10.1)
CHLORIDE SERPL-SCNC: 102 MMOL/L (ref 100–108)
CHOLEST SERPL-MCNC: 189 MG/DL
CLARITY UR: CLEAR
CO2 SERPL-SCNC: 24 MMOL/L (ref 21–32)
COLOR UR: YELLOW
CREAT SERPL-MCNC: 0.9 MG/DL (ref 0.6–1.3)
CREAT UR-MCNC: 16 MG/DL
EOSINOPHIL # BLD AUTO: 0.03 THOUSAND/ΜL (ref 0–0.61)
EOSINOPHIL NFR BLD AUTO: 1 % (ref 0–6)
ERYTHROCYTE [DISTWIDTH] IN BLOOD BY AUTOMATED COUNT: 14.9 % (ref 11.6–15.1)
GFR SERPL CREATININE-BSD FRML MDRD: 67 ML/MIN/1.73SQ M
GGT SERPL-CCNC: 26 U/L (ref 5–85)
GLUCOSE P FAST SERPL-MCNC: 89 MG/DL (ref 65–99)
GLUCOSE SERPL-MCNC: 89 MG/DL (ref 65–140)
GLUCOSE UR STRIP-MCNC: NEGATIVE MG/DL
HCT VFR BLD AUTO: 30.5 % (ref 34.8–46.1)
HDLC SERPL-MCNC: 72 MG/DL
HGB BLD-MCNC: 9.8 G/DL (ref 11.5–15.4)
HGB UR QL STRIP.AUTO: ABNORMAL
IMM GRANULOCYTES # BLD AUTO: 0.04 THOUSAND/UL (ref 0–0.2)
IMM GRANULOCYTES NFR BLD AUTO: 1 % (ref 0–2)
INR PPP: 1.01 (ref 0.84–1.19)
KETONES UR STRIP-MCNC: NEGATIVE MG/DL
LDLC SERPL CALC-MCNC: 100 MG/DL (ref 0–100)
LEUKOCYTE ESTERASE UR QL STRIP: ABNORMAL
LIPASE SERPL-CCNC: 93 U/L (ref 73–393)
LYMPHOCYTES # BLD AUTO: 0.64 THOUSANDS/ΜL (ref 0.6–4.47)
LYMPHOCYTES NFR BLD AUTO: 13 % (ref 14–44)
MAGNESIUM SERPL-MCNC: 1.9 MG/DL (ref 1.6–2.6)
MCH RBC QN AUTO: 32.3 PG (ref 26.8–34.3)
MCHC RBC AUTO-ENTMCNC: 32.1 G/DL (ref 31.4–37.4)
MCV RBC AUTO: 101 FL (ref 82–98)
MONOCYTES # BLD AUTO: 0.42 THOUSAND/ΜL (ref 0.17–1.22)
MONOCYTES NFR BLD AUTO: 8 % (ref 4–12)
NEUTROPHILS # BLD AUTO: 3.87 THOUSANDS/ΜL (ref 1.85–7.62)
NEUTS SEG NFR BLD AUTO: 76 % (ref 43–75)
NITRITE UR QL STRIP: POSITIVE
NON-SQ EPI CELLS URNS QL MICRO: ABNORMAL /HPF
NONHDLC SERPL-MCNC: 117 MG/DL
NRBC BLD AUTO-RTO: 0 /100 WBCS
PH UR STRIP.AUTO: 6 [PH]
PHOSPHATE SERPL-MCNC: 4.4 MG/DL (ref 2.3–4.1)
PLATELET # BLD AUTO: 240 THOUSANDS/UL (ref 149–390)
PMV BLD AUTO: 11.2 FL (ref 8.9–12.7)
POTASSIUM SERPL-SCNC: 4 MMOL/L (ref 3.5–5.3)
PROT SERPL-MCNC: 7.8 G/DL (ref 6.4–8.2)
PROT UR STRIP-MCNC: NEGATIVE MG/DL
PROT UR-MCNC: 20 MG/DL
PROT/CREAT UR: 1.25 MG/G{CREAT} (ref 0–0.1)
PROTHROMBIN TIME: 13.3 SECONDS (ref 11.6–14.5)
RBC # BLD AUTO: 3.03 MILLION/UL (ref 3.81–5.12)
RBC #/AREA URNS AUTO: ABNORMAL /HPF
SODIUM SERPL-SCNC: 137 MMOL/L (ref 136–145)
SP GR UR STRIP.AUTO: <=1.005 (ref 1–1.03)
T3 SERPL-MCNC: 1.1 NG/ML (ref 0.6–1.8)
T4 FREE SERPL-MCNC: 1.13 NG/DL (ref 0.76–1.46)
TRIGL SERPL-MCNC: 87 MG/DL
TSH SERPL DL<=0.05 MIU/L-ACNC: 1.37 UIU/ML (ref 0.45–4.5)
UROBILINOGEN UR QL STRIP.AUTO: 0.2 E.U./DL
WBC # BLD AUTO: 5.03 THOUSAND/UL (ref 4.31–10.16)
WBC #/AREA URNS AUTO: ABNORMAL /HPF

## 2022-04-15 PROCEDURE — 82977 ASSAY OF GGT: CPT

## 2022-04-15 PROCEDURE — 83735 ASSAY OF MAGNESIUM: CPT

## 2022-04-15 PROCEDURE — 81001 URINALYSIS AUTO W/SCOPE: CPT

## 2022-04-15 PROCEDURE — 82150 ASSAY OF AMYLASE: CPT

## 2022-04-15 PROCEDURE — 85025 COMPLETE CBC W/AUTO DIFF WBC: CPT

## 2022-04-15 PROCEDURE — 3008F BODY MASS INDEX DOCD: CPT | Performed by: OBSTETRICS & GYNECOLOGY

## 2022-04-15 PROCEDURE — 84443 ASSAY THYROID STIM HORMONE: CPT

## 2022-04-15 PROCEDURE — 85730 THROMBOPLASTIN TIME PARTIAL: CPT

## 2022-04-15 PROCEDURE — 80061 LIPID PANEL: CPT

## 2022-04-15 PROCEDURE — 83690 ASSAY OF LIPASE: CPT

## 2022-04-15 PROCEDURE — 84156 ASSAY OF PROTEIN URINE: CPT

## 2022-04-15 PROCEDURE — 80053 COMPREHEN METABOLIC PANEL: CPT

## 2022-04-15 PROCEDURE — 84480 ASSAY TRIIODOTHYRONINE (T3): CPT

## 2022-04-15 PROCEDURE — 84100 ASSAY OF PHOSPHORUS: CPT

## 2022-04-15 PROCEDURE — 99213 OFFICE O/P EST LOW 20 MIN: CPT | Performed by: OBSTETRICS & GYNECOLOGY

## 2022-04-15 PROCEDURE — 1036F TOBACCO NON-USER: CPT | Performed by: OBSTETRICS & GYNECOLOGY

## 2022-04-15 PROCEDURE — 85610 PROTHROMBIN TIME: CPT

## 2022-04-15 PROCEDURE — 84439 ASSAY OF FREE THYROXINE: CPT

## 2022-04-15 PROCEDURE — 82570 ASSAY OF URINE CREATININE: CPT

## 2022-04-15 RX ORDER — SODIUM CHLORIDE 9 MG/ML
20 INJECTION, SOLUTION INTRAVENOUS CONTINUOUS
Status: DISCONTINUED | OUTPATIENT
Start: 2022-04-18 | End: 2022-04-19 | Stop reason: HOSPADM

## 2022-04-15 NOTE — PROGRESS NOTES
Labs have been reviewed and signed by Royal Roque  on 4/15/2022   Per protocol, patient is ok to proceed with Cycle 24 Day 1 treatment on [facility] 4/18/22

## 2022-04-15 NOTE — PROGRESS NOTES
Patient was seen in the office for her Cycle 24 Day 1 pretreatment office visit for [facility] with Dr Sunny Benitez  Patient states that she is doing well overall  She states that her appetite seems better this week  She has found some high protein snacks that she enjoys and that has helped   Constipation is still present but waxes and weans  patient states it is manageable with her bowel regimen  Patient will continue with palliative care as well as bariatrics and nutritionist   All AEs and Conmeds were reviewed   Future appointments were reviewed  Overall, patient states that she is feeling okay, and was told to call with any questions or concerns

## 2022-04-18 ENCOUNTER — DOCUMENTATION (OUTPATIENT)
Dept: OTHER | Facility: HOSPITAL | Age: 67
End: 2022-04-18

## 2022-04-18 ENCOUNTER — NUTRITION (OUTPATIENT)
Dept: NUTRITION | Facility: CLINIC | Age: 67
End: 2022-04-18

## 2022-04-18 ENCOUNTER — HOSPITAL ENCOUNTER (OUTPATIENT)
Dept: INFUSION CENTER | Facility: CLINIC | Age: 67
Discharge: HOME | End: 2022-04-18
Payer: COMMERCIAL

## 2022-04-18 VITALS
TEMPERATURE: 97.3 F | SYSTOLIC BLOOD PRESSURE: 122 MMHG | HEIGHT: 59 IN | OXYGEN SATURATION: 96 % | HEART RATE: 65 BPM | WEIGHT: 122 LBS | DIASTOLIC BLOOD PRESSURE: 66 MMHG | BODY MASS INDEX: 24.6 KG/M2 | RESPIRATION RATE: 18 BRPM

## 2022-04-18 DIAGNOSIS — Z71.3 NUTRITIONAL COUNSELING: Primary | ICD-10-CM

## 2022-04-18 PROCEDURE — J9035Q0 INV BEVACIZUMAB 400MG/16ML 16 ML: Performed by: OBSTETRICS & GYNECOLOGY

## 2022-04-18 PROCEDURE — 96413 CHEMO IV INFUSION 1 HR: CPT

## 2022-04-18 PROCEDURE — 96417 CHEMO IV INFUS EACH ADDL SEQ: CPT

## 2022-04-18 PROCEDURE — J9999Q0 INV ATEZOLIZUMAB 1200MG/20ML 20 ML: Performed by: OBSTETRICS & GYNECOLOGY

## 2022-04-18 RX ADMIN — Medication 878 MG: at 10:18

## 2022-04-18 RX ADMIN — Medication 1200 MG: at 09:32

## 2022-04-18 RX ADMIN — SODIUM CHLORIDE 20 ML/HR: 0.9 INJECTION, SOLUTION INTRAVENOUS at 08:33

## 2022-04-18 NOTE — PROGRESS NOTES
Patient was seen in [address] for [address] treatment  Patient was given Cycle 24 Rucaparib pills and drug diary, and patient returned Cycle 23 study pills, and diary  Patient denied any changes to AE'S and conmeds since office visit on 4/15/22   She states that she is feeling well overall   Patient was told to call with any questions or concerns, Patient tolerates treatment without incident

## 2022-04-18 NOTE — PROGRESS NOTES
Pt to clinic for Atezolizumab and Bevacizumab  Pt offers no complaints at this time, pt resting comfortably in recliner at this time

## 2022-04-18 NOTE — PATIENT INSTRUCTIONS
Nutrition Rx & Recommendations:  · Diet: High Calorie, High Protein (for high calorie foods see pages 52-53, and for high protein foods see pages 49-51 in your Eating Hints book)  · Small, frequent meals/snacks may be easier to tolerate than 3 large daily meals  Aim for 5-6 small meals per day (every 2-3 hours)  · Include protein at all meals/snacks  · Avoid spicy, acidic, sharp/hard/crunchy foods & carbonated beverages as needed  · Follow proper oral care; Try baking soda/salt water rinse recipe (mix 3/4 tsp salt + 1 tsp baking soda + 1 qt water; rinse with plain water after using) in Eating Hints book (pg 18)  Brush your teeth before/after meals & before bed  · For appetite loss: try powdered or liquid nutrition supplements; eating by the clock every 2-3 hours; set a timer to remind yourself to eat, keep snacks nearby; add extra protein & calories to your diet; drink liquids in between meals, choose liquids that add calories; eat a bedtime snack; eat soft, cool or frozen foods; eat a larger meal when you feel well & rested; only sip small amounts of liquids during meals (see pages 10-12 in your Eating Hints book)  · For weight loss: monitor your weight at home at least 2x/week, record your weight, start by adding 250-500 extra calories per day, eat 5-6 small scheduled meals every 2-3 hrs, choose foods that are high in protein and calories (see pages 49-53 in your Eating Hints book), drink liquids with calories for example: milkshakes/smoothies/juice/soup/whole milk/chocolate milk, cook with protein fortified milk (see recipe on page 36 in your Eating Hints book), consider ready-to-drink oral nutrition supplements such as Ensure Plus, Ensure Enlive, Boost Plus, or Boost Very High Calorie, avoid "diet" and "light" foods when possible, avoid drinking too much with meals, contact your dietitian with any continued weight loss over the course of 1 week    For more info see pages 35-37 in your Eating Hints book   · For constipation: drink plenty of fluids (>64 oz/day); drink hot liquids; eat high-fiber foods as tolerated (whole grains, beans, peas, nuts, seeds, fruits, vegetables, etc); increase physical activity as tolerated  Avoid increasing fiber intake too quickly, add fiber into your diet slowly; keep a record of your bowel movements (see pages 13-14 in you Eating Hints book)  · For sore mouth/throat: choose foods that are easy to chew/swallow; cook foods until they are soft/tender; moisten & soften foods (gravy/sauce/broth/yogurt); cut food into small pieces; drink with a straw (as tolerated); eat with a very small spoon; eat cold or room temperature food; suck on ice chips; Avoid citrus, spicy foods, tomatoes/ketchup, salty foods, raw vegetables, sharp/crunchy/hard foods & alcohol (see pages 23-28 in your Eating Hints book)  · For dry mouth: sip water throughout the day; try very sweet (or tart, as tolerated) drinks; chew gum or suck on hard candy, popsicles, & ice chips; eat easy to swallow foods (such as pureed cooked foods or soups); moisten food with sauce/gravy/salad dressing; do not drink beer, wine, or any type of alcohol; keep lips moist with lip balm; avoid tobacco products & second-hand smoke; try Biotene as needed (see pages 17-18 in your Eating Hints book)  Follow proper oral care; Try baking soda/salt water rinse recipe (mix 3/4 tsp salt + 1 tsp baking soda + 1 qt water; rinse with pain water after using) in Eating Hints book (pg 18)  Brush your teeth before/after meals & before bed  · Weigh yourself regularly  If you notice weight loss, make an effort to increase your daily food/calorie intake  If you continue to notice loss after these efforts, reach out to your dietitian to establish a plan to stabilize weight     · Snack ideas: [place] yogurt with fruit, pudding, veggies with humus OR try greek yogurt based vegetable dip, peanut butter, grapes and cheese, fruit with a cheese stick , cottage cheese, soup, unjury, protein shakes/smoothies  · Continue to keep a food journal as long as it is helpful  · Re-start apple prune sauce for constipation  · Try to eat something every 1 hr, set alarm on your phone as a reminder    Patient choosen goals:  · Nutrition Supplements:    · Increase Premier Protein Shake to 2X/day - try mixing with [employer]life milk or putting it over ice to reduce sweetness  · Continue adding unflavored Unjury to foods such as soup  · Try to increase your fruit and vegetable intake, add 1-2 servings per day to start    Follow Up Plan: 5/9/22 during infusion   Recommend Referral to Other Providers: none at this time

## 2022-04-19 ENCOUNTER — HOSPITAL ENCOUNTER (OUTPATIENT)
Dept: RADIOLOGY | Facility: HOSPITAL | Age: 67
Discharge: HOME | End: 2022-04-19
Attending: OBSTETRICS & GYNECOLOGY
Payer: MEDICARE

## 2022-04-19 DIAGNOSIS — C54.1 ENDOMETRIAL CANCER (HCC): Primary | ICD-10-CM

## 2022-04-19 DIAGNOSIS — C54.1 ENDOMETRIAL CANCER (HCC): ICD-10-CM

## 2022-04-19 PROCEDURE — 74177 CT ABD & PELVIS W/CONTRAST: CPT

## 2022-04-19 PROCEDURE — 71260 CT THORAX DX C+: CPT

## 2022-04-19 PROCEDURE — G1004 CDSM NDSC: HCPCS

## 2022-04-19 RX ADMIN — IOHEXOL 100 ML: 350 INJECTION, SOLUTION INTRAVENOUS at 14:38

## 2022-04-29 NOTE — PROGRESS NOTES
Outpatient Oncology Nutrition Consult  Type of Consult: Follow Up  Care Location: [facility]   Nutrition Assessment:  Oncology Diagnosis & Treatments:   Endometrial cancer  S/p surgery 12/19/17  Recurrence 7/8/19  S/p chemotherapy (carboplatin/taxol from 7/30/19-1/6/20)  S/p whole pelvis RT (2/4/20- 4/13/20)  Disease progression  ENDOBAR trial started 12/21/20    Oncology History   Endometrial cancer ([address] )   11/17/2017 Initial Diagnosis    Endometrial cancer ([address] )     11/17/2017 Biopsy    ENDOMETRIAL BIOPSY: WELL DIFFERENTIATED ENDOMETRIAL ADENOCARCINOMA (FIGO I) WITH FOCALMUCINOUS FEATURES  Part B: ENDOCERVICAL POLYP:BENIGN ENDOCERVICAL      12/19/2017 Surgery    Robotic assisted total laparoscopic hysterectomy with bilateral salpingo-oophorectomy and sentinel bilateral pelvic lymph node dissection  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 (4 4 x 3 2 cm tumor, 9 4/15 4mm invasion, NO LVSI, washings revealed atypical cellular changes)     12/19/2017 Genetic Testing    Morrison testing negative     6/28/2019 Biopsy    A  Breast, Right, US BX Right Breast 1000 4 cmfn:  - Benign breast tissue with focal histiocytic aggregate  See comment   - Negative for atypia and in-situ or invasive carcinoma  7/8/2019 Recurrence    Presented with right lower extremity DVT and CT demonstrating right pelvic sidewall mass with venous, ureteral and nerve compression causing significant neuropathic pain  Biopsy:  Lymph Node, Right pelvic lymph node x3:  - High-grade adenocarcinoma  7/30/2019 - 1/6/2020 Chemotherapy    Taxol 175 mg/m2 and carboplatin AUC 6 every 21 days  Dose was reduced to taxol 135 mg/m2 and carboplatin AUC 5  Completed 6 cycles  Treatment protracted due to multiple hospital admissions       2/24/2020 - 4/13/2020 Radiation    adjuvant external beam radiation therapy to the whole pelvis to 4500 cGy followed by boost to gross disease of an additional 2200 cGy     11/23/2020 Progression    New necrotic adenopathy in the retroperitoneum on CT      12/21/2020 -  Chemotherapy    Began chemotherapy with rucaparib, atezolizumab, and bevacizumab as per [facility] clinical trial        PMH: RNYGB (2001 at [address])    Past Medical History:   Diagnosis Date    Anemia     Chemotherapy follow-up examination     Depression     Endometrial cancer ([address] ) 12/2017    Hyperglycemia     vx type 2 dm -- last assessed 4/1/14; resolved 11/7/17    Hyperlipidemia     Hypertension     Obesity     last assessed 10/14/17; resolved 11/7/17     Past Surgical History:   Procedure Laterality Date    ABDOMINAL SURGERY      GASTRIC BYPASS    CHOLECYSTECTOMY      at the time of gastric bypass    COLONOSCOPY      CT NEEDLE BIOPSY LYMPH NODE  7/8/2019    FL GUIDED CENTRAL VENOUS ACCESS DEVICE INSERTION  11/12/2019    GASTRIC BYPASS      HYSTERECTOMY Bilateral     total abdominal with salpingo-oophorectomy    IR NEPHROSTOMY TUBE PLACEMENT  7/26/2019    IR NEPHROURETERAL STENT CHECK/CHANGE/REPOSITION  4/6/2021    IR NEPHROURETERAL STENT CHECK/CHANGE/REPOSITION  6/4/2021    IR NEPHROURETERAL STENT CHECK/CHANGE/REPOSITION  9/3/2021    IR NEPHROURETERAL STENT CHECK/CHANGE/REPOSITION  12/7/2021    IR NEPHROURETERAL STENT CHECK/CHANGE/REPOSITION  3/8/2022    IR PICC LINE  9/27/2019    IR PORT PLACEMENT  7/26/2019    IR PORT REMOVAL  9/20/2019    OOPHORECTOMY Bilateral     IA INSJ TUNNELED CTR VAD W/SUBQ PORT AGE 5 YR/> Left 11/12/2019    Procedure: INSERTION VENOUS PORT ( PORT-A-CATH) IR;  Surgeon: Jessie Ching DO;  [facility];  Service: Interventional Radiology    IA LAP, RADICAL HYST W/ TUBE&OV, NODE BX N/A 12/19/2017    Procedure: HYSTERECTOMY LAPAROSCOPIC TOTAL ([address]) W/ ROBOTICS; BILATERAL SALPINGOOPHERECTOMY; LYMPH NODE DISSECTION; lysis of adhesions;  Surgeon: Juan Carlos Boston MD;  Location: [facility];  Service: Gynecology Oncology    IA LAP,DIAGNOSTIC ABDOMEN N/A 12/19/2017 Procedure: LAPAROSCOPY DIAGNOSTIC;  Surgeon: Kecia Silver MD;  [facility];  Service: Gynecology Oncology    TONSILLECTOMY      US GUIDED BREAST BIOPSY RIGHT COMPLETE Right 6/28/2019       Review of Medications:   Vitamins, Supplements and Herbals: yes: Hx RNYGB regimen: Vitamin D, Folic Acid, Align, calcium citrate TID, B12 1000 mcg QD, MVI BID (MVI does not have iron);  Iron 65 mg QD (2 hrs seperate from calcium)    Current Outpatient Medications:     acetaminophen-codeine (TYLENOL #3) 300-30 mg per tablet, Take 1 tablet by mouth every 6 (six) hours as needed for moderate pain, Disp: 20 tablet, Rfl: 0    ARIPiprazole (ABILIFY) 10 mg tablet, Take 20 mg by mouth daily  , Disp: , Rfl:     aspirin (ECOTRIN LOW STRENGTH) 81 mg EC tablet, Take 81 mg by mouth daily, Disp: , Rfl:     Calcium-Magnesium-Vitamin D (CALCIUM 500 PO), Take by mouth daily , Disp: , Rfl:     cholecalciferol (VITAMIN D3) 1,000 units tablet, Take 1,000 Units by mouth daily, Disp: , Rfl:     clotrimazole-betamethasone (LOTRISONE) 1-0 05 % cream, Apply topically 2 (two) times a day, Disp: 30 g, Rfl: 0    Cyanocobalamin (VITAMIN B12 PO), Take by mouth daily , Disp: , Rfl:     dronabinol (MARINOL) 2 5 mg capsule, Take 1 capsule (2 5 mg total) by mouth 2 (two) times a day before meals (Patient taking differently: Take 2 5 mg by mouth 2 (two) times a day before meals As needed ), Disp: 60 capsule, Rfl: 0    enoxaparin (LOVENOX) 80 mg/0 8 mL, Inject 0 8 mL (80 mg total) under the skin every 24 hours, Disp: 30 mL, Rfl: 5    Fiber Adult Gummies 2 g CHEW, Chew 1 tablet in the morning, Disp: 90 tablet, Rfl: 1    folic acid (FOLVITE) 1 mg tablet, Take 1 tablet (1 mg total) by mouth daily, Disp: , Rfl: 0    gemfibrozil (LOPID) 600 mg tablet, TAKE 1 TABLET BY MOUTH EVERY DAY, Disp: 90 tablet, Rfl: 0    lactulose (CHRONULAC) 10 g/15 mL solution, TAKE 45 ML (30 G TOTAL) BY MOUTH 2 (TWO) TIMES A DAY, Disp: 300 mL, Rfl: 2    LORazepam (ATIVAN) 0 5 mg tablet, Take 1 tablet (0 5 mg total) by mouth every 6 (six) hours as needed for anxiety (or nausea), Disp: 36 tablet, Rfl: 1    Multiple Vitamin (MULTIVITAMIN) tablet, Take 1 tablet by mouth daily, Disp: , Rfl:     naloxone (NARCAN) 4 mg/0 1 mL nasal spray, Administer 1 spray into a nostril  If no response after 2-3 minutes, give another dose in the other nostril using a new spray , Disp: 1 each, Rfl: 0    nitrofurantoin (MACROBID) 100 mg capsule, Take 1 capsule (100 mg total) by mouth 2 (two) times a day for 5 days, Disp: 10 capsule, Rfl: 0    omeprazole (PriLOSEC) 20 mg delayed release capsule, Take 20 mg by mouth daily, Disp: , Rfl:     ondansetron (ZOFRAN) 8 mg tablet, Take 1 tablet (8 mg total) by mouth every 8 (eight) hours as needed for nausea or vomiting, Disp: 30 tablet, Rfl: 1    oxybutynin (DITROPAN XL) 15 MG 24 hr tablet, TAKE 1 TABLET BY MOUTH DAILY AT BEDTIME, Disp: 90 tablet, Rfl: 3    oxyCODONE-acetaminophen (Percocet) 5-325 mg per tablet, Take 1 tablet by mouth every 4 (four) hours as needed for moderate pain Max Daily Amount: 6 tablets, Disp: 30 tablet, Rfl: 0    phenazopyridine (PYRIDIUM) 200 mg tablet, Take 1 tablet (200 mg total) by mouth daily as needed for bladder spasms, Disp: 30 tablet, Rfl: 0    predniSONE 5 mg tablet, Take 2 tablets (10 mg total) by mouth daily for 4 days, THEN 1 tablet (5 mg total) daily for 4 days, THEN 1 tablet (5 mg total) every other day for 4 days  , Disp: 14 tablet, Rfl: 0    rucaparib (RUBRACA) 300 mg tablet, Take 600 mg by mouth every 12 (twelve) hours Take with or without food  Do not repeat a vomited dose , Disp: , Rfl:     saccharomyces boulardii (FLORASTOR) 250 mg capsule, Take 1 capsule (250 mg total) by mouth 2 (two) times a day, Disp: , Rfl: 0    venlafaxine (EFFEXOR) 75 mg tablet, Take 75 mg by mouth 3 (three) times a day  , Disp: , Rfl:   No current facility-administered medications for this visit      Facility-Administered Medications Ordered in Other Visits:     INV atezolizumab (INVESTIGATIONAL) 1,200 mg in sodium chloride 0 9 % 250 mL infusion, 1,200 mg, Intravenous, Once, Robert Bello MD    INV bevacizumab (INVESTIGATIONAL) 878 mg in sodium chloride 0 9 % 64 88 mL IVPB, 878 mg, Intravenous, Once, Robert Bello MD    sodium chloride 0 9 % infusion, 20 mL/hr, Intravenous, Continuous, Robert Bello MD, Last Rate: 20 mL/hr at 05/09/22 0825, 20 mL/hr at 05/09/22 0825    Most Recent Lab Results:  Lab Results   Component Value Date    WBC 5 90 05/06/2022    IRON 92 11/19/2021    TIBC 374 11/19/2021    FERRITIN 1,429 (H) 11/19/2021    CHOLESTEROL 161 05/06/2022    CHOL 183 10/27/2017    TRIG 87 04/15/2022    HDL 72 04/15/2022    LDLCALC 100 04/15/2022    ALT 12 05/06/2022    AST 17 05/06/2022    ALB 3 9 05/06/2022     10/27/2017     05/12/2017    SODIUM 138 05/06/2022    SODIUM 137 04/15/2022    K 3 5 05/06/2022    K 4 0 04/15/2022     05/06/2022    BUN 15 05/06/2022    BUN 17 04/15/2022    CREATININE 0 81 05/06/2022    CREATININE 0 90 04/15/2022    EGFR 76 05/06/2022    PHOS 3 6 05/06/2022    PHOS 4 4 (H) 04/15/2022    GLUCOSE 219 (H) 12/19/2017    POCGLU 97 08/18/2020    GLUF 95 05/06/2022    GLUF 89 04/15/2022    GLUC 95 05/06/2022    HGBA1C 4 8 07/07/2021    HGBA1C 5 1 02/03/2020    HGBA1C 6 2 08/28/2019    CALCIUM 9 2 05/06/2022    FOLATE 5 4 10/06/2019    MG 1 7 (L) 05/06/2022     Anthropometric Measurements:   Height: 59"  Ht Readings from Last 1 Encounters:   05/09/22 4' 11" (1 499 m)     Wt Readings from Last 20 Encounters:   05/09/22 56 kg (123 lb 8 oz)   05/06/22 55 1 kg (121 lb 8 oz)   04/18/22 55 3 kg (122 lb)   04/15/22 55 3 kg (122 lb)   04/14/22 55 3 kg (122 lb)   03/28/22 54 kg (119 lb)   03/25/22 54 6 kg (120 lb 5 9 oz)   03/18/22 56 7 kg (125 lb)   03/16/22 56 7 kg (125 lb)   03/08/22 56 7 kg (125 lb)   03/07/22 56 2 kg (124 lb)   03/04/22 55 1 kg (121 lb 8 oz)   02/24/22 56 kg (123 lb 6 4 oz) 02/17/22 58 5 kg (129 lb)   02/14/22 57 2 kg (126 lb)   02/11/22 58 5 kg (129 lb)   01/25/22 61 5 kg (135 lb 9 6 oz)   01/24/22 60 3 kg (133 lb)   01/21/22 61 2 kg (134 lb 14 7 oz)   01/21/22 61 2 kg (135 lb)     Weight Hx:  Usual Weight: 270# before RNYGB in 2001; lowest post-op wt: 200#; wt typically fluctuates between 215-230# (prior to wt loss)  Varian: (2/25/20) 185 6#, (3/3/20) 188 6#, (3/10/20) 187 2#, (3/19/20) 186 2#, (3/24/20) 187#, (3/31/20) 185 4#, (4/10/20) 184 4#    Home Scale Wts: (2/19/20) 185#, (8/3/20) 194#    Oncology Nutrition-Anthropometrics      Nutrition from 5/9/2022 in [facility] Nutrition from 4/18/2022 in [facility]   Patient age (years): 61 years 61 years   Patient (female) height (in): 61 in 61 in   Current Weight to be used for anthropometric calculations (lbs) 123 5 lbs 122 lbs   Current Weight to be used for anthropometric calculations (kg) 56 1 kg 55 5 kg   BMI: 24 9 24 6   IBW female: 95 lbs 95 lbs   IBW (kg) female: 43 2 kg 43 2 kg   IBW % (female) 130 % 128 4 %   Adjusted BW (female): 102 1 lbs 101 8 lbs   Adjusted BW kg (female): 46 4 kg 46 3 kg   % weight change after 1 month: 1 2 % -2 4 %   Weight change after 1 month (lbs) 1 5 lbs -3 lbs   % weight change after 3 months: -4 3 % -9 6 %   Weight change after 3 months (lbs) -5 5 lbs -13 lbs   % weight change after 6 months: -16 % --   Weight change after 6 months (lbs) -23 5 lbs --   % weight change after 1 year: -- -33 %         Nutrition-Focused Physical Findings:  severe muscle depletion ([place]) - hollowing/scooping/depression and severe body fat depletion ([place]) - hollowing/depression/dark circles    Food/Nutrition-Related History & Client/Social History:    Current Nutrition Impact Symptoms:   [] Nausea  [x] Reduced Appetite - improved [] Acid Reflux   [] Vomiting  [x] Unintended Wt Loss - improving  -significant x6 months [x] Malabsorption - S/p RNYGB in 2001   [] Diarrhea  [] Unintended Wt Gain  [] Dumping Syndrome   [x] Constipation - "not as much", says bowels have been good lately  -On bowel protocol [] Thick Mucous/Secretions  [] Abdominal Pain    [] Dysgeusia (Altered Taste) [x] Xerostomia (Dry Mouth) - comes and goes  -uses bs/sw rinses and Biotene [] Gas    [] Dysosmia (Altered Smell)  [] Thrush  [] Difficulty Chewing    [x] Oral Mucositis (Sore Mouth) - +mouth sores  -using biotene products + bs/sw rinses  -does not follow soft diet [x] Fatigue - improved [x] Pain: pubic area, limits po intake   [] Odynophagia   [] Esophagitis  [] Other:    [] Dysphagia [] Early Satiety  [] No Problems Eating      Food Allergies & Intolerances: yes: had skin patch testing which showed allergy to strawberries, but says she is able to eat strawberries without any side effects    Current Diet: Small Frequent Meals  Current Nutrition Intake: Increased since last visit   Appetite: Good   Nutrition Route: PO   Oral Care: Biotene toothpaste, rinse and spray, bs/sw rinses  Activity level: Sedentary  Limited by pain and medical condition       Usual Day Diet Recall: keeps a food journal, helps with planning and accountability  -says she has not been eating enough protein lately  -says she has been eating more fruits but not veggies    Breakfast: 1 [place] waffle with butter and jelly  Snack: 1/2 bagel with butter  Lunch: 3 oz ham with stuffing  Snack: watermelon  Dinner: 3 oz ham with 1/4 cup mac and cheese  Snack: 1 cupcake  Snack: strawberries with whipped cream (had to take her time eating this d/t mouth sores)     Beverages: water (16 9 oz x2), iced tea (32 oz x1, occasionally more), Premier protein shake (11 oz x1), Fairlife milk (occasionally with cereal)   -Does not drink coffee or alcohol   -Does not separate fluids from solids, does not have discomfort while eating and drinking at the same time  Supplements:   Premier Protein Shake (11 oz, 160 kcal, 30 g pro) - QD, finds it sweet so adds a little water, discussed adding milk instead   Unjury protein powder - none lately    Oncology Nutrition-Estimated Needs      Nutrition from 3/28/2022 in [place] [address] [facility] Nutrition from 10/11/2021 in [place] [address] [facility]   Weight type used Actual weight Adjusted weight   Weight in kilograms (kg) used for estimated needs 54 1 kg 49 5 kg   Energy needs formula:  35-40 kcal/kg 35-40 kcal/kg   Energy needs based on 35 kcal/k kcal 1733 kcal   Energy needs based on 40 kcal/k kcal 1980 kcal   Protein needs formula: 1 5-2 g/kg 1 5-2 g/kg   Protein needs based on 1 5 g/kg 81 g 74 g   Protein needs based on 2 g/kg 108 g 99 g   Fluid needs formula: 30-35 mL/kg 30-35 mL/kg   Fluid needs based on 30 mL/kg 1623 mL 1485 mL   Fluid needs in ounces 55 oz 50 oz   Fluid needs based on 35 mL/kg 1894 mL 1733 mL   Fluid needs in ounces 64 oz 59 oz        Discussion & Intervention:    Ragini was evaluated today for an RD follow up regarding wt loss and pt request for dietitian involvement throughout tx  Ragini is currently undergoing tx for endometrial cancer  Ragini is doing well today and has been feeling good recently     She has not lost any wt since last visit  Her appetite is good  She is eating small/frequent meals  She is hydrating well  Her constipation has improved  She continues to drink 1 Premier protein shake daily  She continues to experience mouth sores but does not limit her diet  She thinks that she has been eating less protein recently and would like to work on this  Reviewed 24 hour recall, which revealed an suboptimal po intake, and discussed ways to increase kcal, protein, and fluid intakes and optimize nutrient intake    Also reviewed the importance of wt management throughout the tx process and the role of a high kcal/ high protein diet in managing wt and overall health  Based on today's assessment, discussion included: MNT for: sore mouth, practicing proper oral care, a high kcal/protein diet & food choices to include at all meals & snacks (Examples of high kcal foods: cheese, full-fat dairy products, nut butter, plant-based fats, coconut oil/milk, avocado, butter, cream soup, etc  Examples of high protein foods: eggs, chicken, fish, beans/legumes, nuts/nut butters, bone broth, etc ) , fortifying foods for added kcal and protein (examples include: adding cheese to foods such as eggs, mashed potatoes, casseroles, etc ; Making oatmeal with whole milk rather than water; Making fortified mashed potatoes with cream, butter, dry milk powder, plain [place] yogurt, and cheese ), adequate hydration & fluid choices, sipping on calorie containing beverages (examples include: adding whole milk or cream to coffee, oral nutrition supplements, juice, electrolyte replacement beverages, milk, etc ), eating smaller more frequent meals every 2-3 hours (5-6 small meals/day), having consistent and planned snacks between meals, eating when feeling most hungry, utilizing oral nutrition supplements and recipe suggestions/resources, trying new recipes, & cooking at home more often  Moving forward, Ragini was encouraged to increase kcal, protein, and fluid intakes  She does best with self directed goals and has chosen goals for herself moving forward  Materials Provided: not applicable  All questions and concerns addressed during todays visit  Ragini has RD contact information  Nutrition Diagnosis:  Inadequate Energy Intake related to physiological causes, disease state and treatment related issues as evidenced by food recall, wt loss and discussion with pt and/or family  Increased Nutrient Needs (kcal & pro) related to increased demand for nutrients and disease state as evidenced by cancer dx and pt undergoing tx for cancer    Altered GI Function related to alteration in GI tract motility and integrity as evidenced by Constipation and Oral Mucositis (Sore Mouth)  Patient has clinical indicators (or ASPEN criteria) consistent with severe protein-calorie malnutrition in the context of Chronic Illness as evidenced by >10% wt loss in 6 months, </=75% energy intake vs  Estimated needs for >/=1 month, severe muscle depletion ([place]) - hollowing/scooping/depression and severe body fat depletion ([place]) - hollowing/depression/dark circles  Monitoring & Evaluation:   Goals:  weight maintenance/stabilization  adequate nutrition impact symptom management  pt to meet >/=75% estimated nutrition needs daily     Progress Towards Goals: Progressing    Nutrition Rx & Recommendations:  Diet: High Calorie, High Protein (for high calorie foods see pages 52-53, and for high protein foods see pages 49-51 in your Eating Hints book)  Small, frequent meals/snacks may be easier to tolerate than 3 large daily meals  Aim for 5-6 small meals per day (every 2-3 hours)  Include protein at all meals/snacks  Avoid spicy, acidic, sharp/hard/crunchy foods & carbonated beverages as needed  Follow proper oral care; Try baking soda/salt water rinse recipe (mix 3/4 tsp salt + 1 tsp baking soda + 1 qt water; rinse with plain water after using) in Eating Hints book (pg 18)  Brush your teeth before/after meals & before bed  For appetite loss: try powdered or liquid nutrition supplements; eating by the clock every 2-3 hours; set a timer to remind yourself to eat, keep snacks nearby; add extra protein & calories to your diet; drink liquids in between meals, choose liquids that add calories; eat a bedtime snack; eat soft, cool or frozen foods; eat a larger meal when you feel well & rested; only sip small amounts of liquids during meals (see pages 10-12 in your Eating Hints book)    For weight loss: monitor your weight at home at least 2x/week, record your weight, start by adding 250-500 extra calories per day, eat 5-6 small scheduled meals every 2-3 hrs, choose foods that are high in protein and calories (see pages 49-53 in your Eating Hints book), drink liquids with calories for example: milkshakes/smoothies/juice/soup/whole milk/chocolate milk, cook with protein fortified milk (see recipe on page 36 in your Eating Hints book), consider ready-to-drink oral nutrition supplements such as Ensure Plus, Ensure Enlive, Boost Plus, or Boost Very High Calorie, avoid "diet" and "light" foods when possible, avoid drinking too much with meals, contact your dietitian with any continued weight loss over the course of 1 week  For more info see pages 35-37 in your Eating Hints book  For constipation: drink plenty of fluids (>64 oz/day); drink hot liquids; eat high-fiber foods as tolerated (whole grains, beans, peas, nuts, seeds, fruits, vegetables, etc); increase physical activity as tolerated  Avoid increasing fiber intake too quickly, add fiber into your diet slowly; keep a record of your bowel movements (see pages 13-14 in you Eating Hints book)  For sore mouth/throat: choose foods that are easy to chew/swallow; cook foods until they are soft/tender; moisten & soften foods (gravy/sauce/broth/yogurt); cut food into small pieces; drink with a straw (as tolerated); eat with a very small spoon; eat cold or room temperature food; suck on ice chips; Avoid citrus, spicy foods, tomatoes/ketchup, salty foods, raw vegetables, sharp/crunchy/hard foods & alcohol (see pages 23-28 in your Eating Hints book)    For dry mouth: sip water throughout the day; try very sweet (or tart, as tolerated) drinks; chew gum or suck on hard candy, popsicles, & ice chips; eat easy to swallow foods (such as pureed cooked foods or soups); moisten food with sauce/gravy/salad dressing; do not drink beer, wine, or any type of alcohol; keep lips moist with lip balm; avoid tobacco products & second-hand smoke; try Biotene as needed (see pages 17-18 in your Eating Hints book)  Follow proper oral care; Try baking soda/salt water rinse recipe (mix 3/4 tsp salt + 1 tsp baking soda + 1 qt water; rinse with pain water after using) in Eating Hints book (pg 18)  Brush your teeth before/after meals & before bed  Weigh yourself regularly  If you notice weight loss, make an effort to increase your daily food/calorie intake  If you continue to notice loss after these efforts, reach out to your dietitian to establish a plan to stabilize weight  Snack ideas: [place] yogurt with fruit, pudding, veggies with humus OR try greek yogurt based vegetable dip, peanut butter, grapes and cheese, fruit with a cheese stick , cottage cheese, soup, unjury, protein shakes/smoothies  Continue to keep a food journal as long as it is helpful  Re-start apple prune sauce for constipation  Try to eat something every 1 hr, set alarm on your phone as a reminder    Patient choosen goals:   Increase protein intake by adding protein to snacks  Scoop of pb, 1/2 Premier, cheese, protein wafers, eggs, [place] yogurt  Nutrition Supplements:    Continue Premier Protein Shake 1X/day - try mixing with Fairlife milk or putting it over ice to reduce sweetness  Add Unjury protein powder to foods as needed    Follow Up Plan: 6/20 during infusion   Recommend Referral to Other Providers: none at this time

## 2022-05-02 ENCOUNTER — TELEPHONE (OUTPATIENT)
Dept: RADIOLOGY | Facility: HOSPITAL | Age: 67
End: 2022-05-02

## 2022-05-02 RX ORDER — CEFAZOLIN SODIUM 1 G/50ML
1000 SOLUTION INTRAVENOUS ONCE
Status: CANCELLED | OUTPATIENT
Start: 2022-05-10

## 2022-05-02 NOTE — PRE-PROCEDURE INSTRUCTIONS
Pre-procedure Instructions for Interventional Radiology  [address] [place] [address] [id number] [phone]    You are scheduled for a/an Routine Right PCNU exchange  On Tuesday 5/10/22    Your tentative arrival time is AM   Short stay will notify you the day before your procedure with the exact arrival time and the location to arrive  To prepare for your procedure:  1  Please arrange for someone to drive you home after the procedure and stay with you until the next morning if you are instructed to do so  This is typically for patients receiving some type of sedative or anesthetic for the procedure  2  DO NOT EAT OR DRINK ANYTHING after midnight on the evening before your procedure including candy & gum   3  ONLY SIPS OF WATER with your medications are allowed on the morning of your procedure  4  TAKE ALL OF YOUR REGULAR MEDICATIONS THE MORNING OF YOUR PROCEDURE with sips of water! We may call you to stop some of your blood sugar, blood pressure and blood thinning medications depending on the procedure  Please take all of these medications unless we instruct you to stop them  5  If you have an allergy to x-ray dye, please contact Interventional Radiology for an x-ray dye preparation which usually consists of an oral steroid and Benadryl  The day of your procedure:  1  Bring a list of the medications you take at home  2  Bring medications you take for breathing problems (such as inhalers), medications for chest pain, or both  3  Bring a case for your glasses or contacts  4  Bring your insurance card and a form of photo ID   5  Please leave all valuables such as credit cards and jewelry at home  6  Report to the registration desk in the main lobby at the [facility]  Ask to be directed to [facility]    7  While your procedure is being performed, your family may wait in the Radiology Waiting Room on the 1st floor in Radiology  if they need to leave, they may provide a number to be called following the procedure  8  Be prepared to stay overnight just in case  Sometimes procedures will indicate the need for further observation or treatment  9  If you are scheduled for a follow-up visit with the Interventional Radiologist after your procedure, you will be called with a date and time  10  Covid vaccine plus booster completed      Special Instructions (Medications to stop taking before your procedure etc ):

## 2022-05-03 ENCOUNTER — APPOINTMENT (OUTPATIENT)
Dept: LAB | Facility: CLINIC | Age: 67
End: 2022-05-03
Payer: COMMERCIAL

## 2022-05-03 ENCOUNTER — TELEPHONE (OUTPATIENT)
Dept: UROLOGY | Facility: AMBULATORY SURGERY CENTER | Age: 67
End: 2022-05-03

## 2022-05-03 DIAGNOSIS — R30.9 PAIN WITH URINATION: ICD-10-CM

## 2022-05-03 DIAGNOSIS — R30.9 PAIN WITH URINATION: Primary | ICD-10-CM

## 2022-05-03 LAB
BACTERIA UR QL AUTO: ABNORMAL /HPF
BILIRUB UR QL STRIP: NEGATIVE
CLARITY UR: ABNORMAL
COLOR UR: COLORLESS
GLUCOSE UR STRIP-MCNC: NEGATIVE MG/DL
HGB UR QL STRIP.AUTO: ABNORMAL
KETONES UR STRIP-MCNC: NEGATIVE MG/DL
LEUKOCYTE ESTERASE UR QL STRIP: ABNORMAL
NITRITE UR QL STRIP: NEGATIVE
NON-SQ EPI CELLS URNS QL MICRO: ABNORMAL /HPF
PH UR STRIP.AUTO: 6 [PH]
PROT UR STRIP-MCNC: ABNORMAL MG/DL
RBC #/AREA URNS AUTO: ABNORMAL /HPF
SP GR UR STRIP.AUTO: 1 (ref 1–1.03)
UROBILINOGEN UR STRIP-ACNC: <2 MG/DL
WBC #/AREA URNS AUTO: ABNORMAL /HPF
WBC CLUMPS # UR AUTO: PRESENT /UL

## 2022-05-03 PROCEDURE — 87086 URINE CULTURE/COLONY COUNT: CPT

## 2022-05-03 PROCEDURE — 87186 SC STD MICRODIL/AGAR DIL: CPT

## 2022-05-03 PROCEDURE — 81001 URINALYSIS AUTO W/SCOPE: CPT

## 2022-05-03 PROCEDURE — 87077 CULTURE AEROBIC IDENTIFY: CPT

## 2022-05-03 NOTE — TELEPHONE ENCOUNTER
Called Ragini and notified her that urine studies were placed in the system to have completeed at any [facility] [place]   She will go tomorrow

## 2022-05-03 NOTE — TELEPHONE ENCOUNTER
Regarding: Pain  ----- Message from Ursula Chambers RN sent at 5/3/2022  8:09 AM EDT -----       ----- Message from Ranjana Valadez to RAFITA Hernandez sent at 5/2/2022  1:23 PM -----   Hi! Ryan Elise ([birth year]) here  I am writing to let you know that I am once again experiencing pain in my pubic/vaginal region  I have tried several things including Replens and nothing seems to work consistently  Any suggestions? I am scheduled to see you on May 16 - is there any way I can come in sooner to discuss this issue? Please let me know! Thank you!

## 2022-05-04 ENCOUNTER — TELEPHONE (OUTPATIENT)
Dept: OTHER | Facility: OTHER | Age: 67
End: 2022-05-04

## 2022-05-04 NOTE — TELEPHONE ENCOUNTER
Patient called in to report pain with urination persists and she is calling in to review urinalysis and urine culture results from 5/3/22  Triage RN did report to patient that culture results were not finalized yet  Please follow up with patient

## 2022-05-05 DIAGNOSIS — C54.1 ENDOMETRIAL CANCER (HCC): Primary | ICD-10-CM

## 2022-05-06 ENCOUNTER — TELEPHONE (OUTPATIENT)
Dept: UROLOGY | Facility: AMBULATORY SURGERY CENTER | Age: 67
End: 2022-05-06

## 2022-05-06 ENCOUNTER — HOSPITAL ENCOUNTER (OUTPATIENT)
Dept: INFUSION CENTER | Facility: CLINIC | Age: 67
Discharge: HOME | End: 2022-05-06
Payer: COMMERCIAL

## 2022-05-06 ENCOUNTER — OFFICE VISIT (OUTPATIENT)
Dept: GYNECOLOGIC ONCOLOGY | Facility: CLINIC | Age: 67
End: 2022-05-06
Payer: COMMERCIAL

## 2022-05-06 VITALS
SYSTOLIC BLOOD PRESSURE: 130 MMHG | OXYGEN SATURATION: 99 % | HEIGHT: 59 IN | WEIGHT: 121.5 LBS | BODY MASS INDEX: 24.49 KG/M2 | DIASTOLIC BLOOD PRESSURE: 88 MMHG | TEMPERATURE: 97.9 F | HEART RATE: 79 BPM | RESPIRATION RATE: 16 BRPM

## 2022-05-06 DIAGNOSIS — T45.1X5A CHEMOTHERAPY INDUCED NEUTROPENIA (HCC): ICD-10-CM

## 2022-05-06 DIAGNOSIS — N39.0 CHRONIC UTI: ICD-10-CM

## 2022-05-06 DIAGNOSIS — C54.1 ENDOMETRIAL CANCER (HCC): Primary | Chronic | ICD-10-CM

## 2022-05-06 DIAGNOSIS — C54.1 ENDOMETRIAL CANCER (HCC): ICD-10-CM

## 2022-05-06 DIAGNOSIS — M80.00XS OSTEOPOROSIS WITH CURRENT PATHOLOGICAL FRACTURE, UNSPECIFIED OSTEOPOROSIS TYPE, SEQUELA: Primary | ICD-10-CM

## 2022-05-06 DIAGNOSIS — N39.0 RECURRENT UTI: Primary | ICD-10-CM

## 2022-05-06 DIAGNOSIS — D70.1 CHEMOTHERAPY INDUCED NEUTROPENIA (HCC): ICD-10-CM

## 2022-05-06 DIAGNOSIS — E83.42 HYPOMAGNESEMIA: ICD-10-CM

## 2022-05-06 DIAGNOSIS — Z93.6 NEPHROSTOMY STATUS (HCC): ICD-10-CM

## 2022-05-06 LAB
ALBUMIN SERPL BCP-MCNC: 3.9 G/DL (ref 3.5–5)
ALP SERPL-CCNC: 148 U/L (ref 34–104)
ALT SERPL W P-5'-P-CCNC: 12 U/L (ref 7–52)
AMYLASE SERPL-CCNC: 20 IU/L (ref 29–103)
ANION GAP SERPL CALCULATED.3IONS-SCNC: 9 MMOL/L (ref 4–13)
APTT PPP: 51 SECONDS (ref 23–37)
AST SERPL W P-5'-P-CCNC: 17 U/L (ref 13–39)
BACTERIA UR CULT: ABNORMAL
BASOPHILS # BLD AUTO: 0.03 THOUSANDS/ΜL (ref 0–0.1)
BASOPHILS NFR BLD AUTO: 1 % (ref 0–1)
BILIRUB SERPL-MCNC: 0.3 MG/DL (ref 0.2–1)
BUN SERPL-MCNC: 15 MG/DL (ref 5–25)
CALCIUM SERPL-MCNC: 9.2 MG/DL (ref 8.4–10.2)
CHLORIDE SERPL-SCNC: 106 MMOL/L (ref 96–108)
CHOLEST SERPL-MCNC: 161 MG/DL
CO2 SERPL-SCNC: 23 MMOL/L (ref 21–32)
CREAT SERPL-MCNC: 0.81 MG/DL (ref 0.6–1.3)
EOSINOPHIL # BLD AUTO: 0.02 THOUSAND/ΜL (ref 0–0.61)
EOSINOPHIL NFR BLD AUTO: 0 % (ref 0–6)
ERYTHROCYTE [DISTWIDTH] IN BLOOD BY AUTOMATED COUNT: 14.6 % (ref 11.6–15.1)
GFR SERPL CREATININE-BSD FRML MDRD: 76 ML/MIN/1.73SQ M
GGT SERPL-CCNC: 13 U/L (ref 5–85)
GLUCOSE P FAST SERPL-MCNC: 95 MG/DL (ref 65–99)
GLUCOSE SERPL-MCNC: 95 MG/DL (ref 65–140)
HCT VFR BLD AUTO: 32.7 % (ref 34.8–46.1)
HGB BLD-MCNC: 10.2 G/DL (ref 11.5–15.4)
IMM GRANULOCYTES # BLD AUTO: 0.05 THOUSAND/UL (ref 0–0.2)
IMM GRANULOCYTES NFR BLD AUTO: 1 % (ref 0–2)
INR PPP: 1.06 (ref 0.84–1.19)
LIPASE SERPL-CCNC: 68 U/L (ref 11–82)
LYMPHOCYTES # BLD AUTO: 0.71 THOUSANDS/ΜL (ref 0.6–4.47)
LYMPHOCYTES NFR BLD AUTO: 12 % (ref 14–44)
MAGNESIUM SERPL-MCNC: 1.7 MG/DL (ref 1.9–2.7)
MCH RBC QN AUTO: 32 PG (ref 26.8–34.3)
MCHC RBC AUTO-ENTMCNC: 31.2 G/DL (ref 31.4–37.4)
MCV RBC AUTO: 103 FL (ref 82–98)
MONOCYTES # BLD AUTO: 0.44 THOUSAND/ΜL (ref 0.17–1.22)
MONOCYTES NFR BLD AUTO: 8 % (ref 4–12)
NEUTROPHILS # BLD AUTO: 4.65 THOUSANDS/ΜL (ref 1.85–7.62)
NEUTS SEG NFR BLD AUTO: 78 % (ref 43–75)
NRBC BLD AUTO-RTO: 0 /100 WBCS
PHOSPHATE SERPL-MCNC: 3.6 MG/DL (ref 2.3–4.1)
PLATELET # BLD AUTO: 221 THOUSANDS/UL (ref 149–390)
PMV BLD AUTO: 10.3 FL (ref 8.9–12.7)
POTASSIUM SERPL-SCNC: 3.5 MMOL/L (ref 3.5–5.3)
PROT SERPL-MCNC: 7.2 G/DL (ref 6.4–8.4)
PROTHROMBIN TIME: 13.8 SECONDS (ref 11.6–14.5)
RBC # BLD AUTO: 3.19 MILLION/UL (ref 3.81–5.12)
SODIUM SERPL-SCNC: 138 MMOL/L (ref 135–147)
T3 SERPL-MCNC: 1.1 NG/ML (ref 0.6–1.8)
T4 FREE SERPL-MCNC: 1.1 NG/DL (ref 0.76–1.46)
TSH SERPL DL<=0.05 MIU/L-ACNC: 1.69 UIU/ML (ref 0.45–4.5)
WBC # BLD AUTO: 5.9 THOUSAND/UL (ref 4.31–10.16)

## 2022-05-06 PROCEDURE — 82977 ASSAY OF GGT: CPT

## 2022-05-06 PROCEDURE — 82465 ASSAY BLD/SERUM CHOLESTEROL: CPT

## 2022-05-06 PROCEDURE — 84443 ASSAY THYROID STIM HORMONE: CPT

## 2022-05-06 PROCEDURE — 83690 ASSAY OF LIPASE: CPT

## 2022-05-06 PROCEDURE — 80053 COMPREHEN METABOLIC PANEL: CPT

## 2022-05-06 PROCEDURE — 82150 ASSAY OF AMYLASE: CPT

## 2022-05-06 PROCEDURE — 85730 THROMBOPLASTIN TIME PARTIAL: CPT

## 2022-05-06 PROCEDURE — 99213 OFFICE O/P EST LOW 20 MIN: CPT | Performed by: OBSTETRICS & GYNECOLOGY

## 2022-05-06 PROCEDURE — 84439 ASSAY OF FREE THYROXINE: CPT

## 2022-05-06 PROCEDURE — 83735 ASSAY OF MAGNESIUM: CPT

## 2022-05-06 PROCEDURE — 85025 COMPLETE CBC W/AUTO DIFF WBC: CPT

## 2022-05-06 PROCEDURE — 85610 PROTHROMBIN TIME: CPT

## 2022-05-06 PROCEDURE — 84100 ASSAY OF PHOSPHORUS: CPT

## 2022-05-06 PROCEDURE — 84480 ASSAY TRIIODOTHYRONINE (T3): CPT

## 2022-05-06 RX ORDER — NITROFURANTOIN 25; 75 MG/1; MG/1
100 CAPSULE ORAL 2 TIMES DAILY
Qty: 10 CAPSULE | Refills: 0 | Status: SHIPPED | OUTPATIENT
Start: 2022-05-06 | End: 2022-05-11

## 2022-05-06 RX ORDER — SODIUM CHLORIDE 9 MG/ML
20 INJECTION, SOLUTION INTRAVENOUS CONTINUOUS
Status: DISCONTINUED | OUTPATIENT
Start: 2022-05-09 | End: 2022-05-12 | Stop reason: HOSPADM

## 2022-05-06 NOTE — H&P (VIEW-ONLY)
Assessment/Plan:    Problem List Items Addressed This Visit        Genitourinary    Endometrial cancer ([facility] [address] ) - Primary (Chronic)     51-year-old with recurrent stage IB endometrial cancer who is receiving treatment per the [facility] trial presents for pre cycle 25 visit  CBC, CMP, mag, reviewed and all hematologic parameters support continued treatment    CT with 1 5cm residual aortocaval LN remaining  Tolerating chemo with minimal toxicity    Proceed with cycle #25           Chronic UTI     Recurrent  Urology treating current            Other    Nephrostomy status [facility])     Imaging without residual disease in retroperitoneum  Will reach out to IR to cap PCN and attempt removal vs internalization  CHIEF COMPLAINT: pre chemo      Problem:  Cancer Staging  Endometrial cancer [facility])  Staging form: Corpus Uteri - Carcinoma, AJCC 7th Edition  - Clinical stage from 12/19/2017: FIGO Stage IB (T1b, N0, M0) - Signed by Araceli Simpson MD on 2/12/2018        Previous therapy:  Oncology History   Endometrial cancer ([facility] [address] )   11/17/2017 Initial Diagnosis    Endometrial cancer ([facility] [address] )     11/17/2017 Biopsy    ENDOMETRIAL BIOPSY: WELL DIFFERENTIATED ENDOMETRIAL ADENOCARCINOMA (FIGO I) WITH FOCALMUCINOUS FEATURES  Part B: ENDOCERVICAL POLYP:BENIGN ENDOCERVICAL      12/19/2017 Surgery    Robotic assisted total laparoscopic hysterectomy with bilateral salpingo-oophorectomy and sentinel bilateral pelvic lymph node dissection  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 (4 4 x 3 2 cm tumor, 9 4/15 4mm invasion, NO LVSI, washings revealed atypical cellular changes)     12/19/2017 Genetic Testing    Morrison testing negative     6/28/2019 Biopsy    A  Breast, Right, US BX Right Breast 1000 4 cmfn:  - Benign breast tissue with focal histiocytic aggregate  See comment   - Negative for atypia and in-situ or invasive carcinoma       7/8/2019 Recurrence    Presented with right lower extremity DVT and CT demonstrating right pelvic sidewall mass with venous, ureteral and nerve compression causing significant neuropathic pain  Biopsy:  Lymph Node, Right pelvic lymph node x3:  - High-grade adenocarcinoma  7/30/2019 - 1/6/2020 Chemotherapy    Taxol 175 mg/m2 and carboplatin AUC 6 every 21 days  Dose was reduced to taxol 135 mg/m2 and carboplatin AUC 5  Completed 6 cycles  Treatment protracted due to multiple hospital admissions  2/24/2020 - 4/13/2020 Radiation    adjuvant external beam radiation therapy to the whole pelvis to 4500 cGy followed by boost to gross disease of an additional 2200 cGy     11/23/2020 Progression    New necrotic adenopathy in the retroperitoneum on CT      12/21/2020 -  Chemotherapy    Began chemotherapy with rucaparib, atezolizumab, and bevacizumab as per [facility] clinical trial            Patient ID: Reese Diaz is a 61 y o  female  79-year-old with recurrent stage IB endometrial cancer who is receiving treatment per the [facility] trial presents for pre cycle 25 visit  Pt reports improvement in her overall fatigue  +UTI symptoms for which urology sent culture  She continues to have a PCNU to drainage with follow-up for exchange with IR 5/10  She has intermittent constipation managed with medications  The following portions of the patient's history were reviewed and updated as appropriate: allergies, current medications, past family history, past medical history, past social history, past surgical history and problem list     Review of Systems   Constitutional: Positive for fatigue  Negative for appetite change, chills and fever  Respiratory: Negative for chest tightness and shortness of breath  Gastrointestinal: Positive for constipation  Negative for abdominal distention, abdominal pain, diarrhea and nausea  Genitourinary: Positive for frequency and urgency  Negative for difficulty urinating, flank pain, vaginal bleeding, vaginal discharge and vaginal pain  Musculoskeletal: Negative for back pain, joint swelling and myalgias  Skin: Negative for rash  Neurological: Negative for dizziness, light-headedness, numbness and headaches  Current Outpatient Medications   Medication Sig Dispense Refill    acetaminophen-codeine (TYLENOL #3) 300-30 mg per tablet Take 1 tablet by mouth every 6 (six) hours as needed for moderate pain 20 tablet 0    ARIPiprazole (ABILIFY) 10 mg tablet Take 20 mg by mouth daily        aspirin (ECOTRIN LOW STRENGTH) 81 mg EC tablet Take 81 mg by mouth daily      Calcium-Magnesium-Vitamin D (CALCIUM 500 PO) Take by mouth daily       cholecalciferol (VITAMIN D3) 1,000 units tablet Take 1,000 Units by mouth daily      clotrimazole-betamethasone (LOTRISONE) 1-0 05 % cream Apply topically 2 (two) times a day 30 g 0    Cyanocobalamin (VITAMIN B12 PO) Take by mouth daily       dronabinol (MARINOL) 2 5 mg capsule Take 1 capsule (2 5 mg total) by mouth 2 (two) times a day before meals (Patient taking differently: Take 2 5 mg by mouth 2 (two) times a day before meals As needed ) 60 capsule 0    enoxaparin (LOVENOX) 80 mg/0 8 mL Inject 0 8 mL (80 mg total) under the skin every 24 hours 30 mL 5    Fiber Adult Gummies 2 g CHEW Chew 1 tablet in the morning (Patient not taking: Reported on 4/14/2022 ) 90 tablet 1    folic acid (FOLVITE) 1 mg tablet Take 1 tablet (1 mg total) by mouth daily  0    gemfibrozil (LOPID) 600 mg tablet TAKE 1 TABLET BY MOUTH EVERY DAY 90 tablet 0    lactulose (CHRONULAC) 10 g/15 mL solution TAKE 45 ML (30 G TOTAL) BY MOUTH 2 (TWO) TIMES A  mL 2    LORazepam (ATIVAN) 0 5 mg tablet Take 1 tablet (0 5 mg total) by mouth every 6 (six) hours as needed for anxiety (or nausea) 36 tablet 1    Multiple Vitamin (MULTIVITAMIN) tablet Take 1 tablet by mouth daily      naloxone (NARCAN) 4 mg/0 1 mL nasal spray Administer 1 spray into a nostril   If no response after 2-3 minutes, give another dose in the other nostril using a new spray  1 each 0    nitrofurantoin (MACROBID) 100 mg capsule Take 1 capsule (100 mg total) by mouth 2 (two) times a day for 5 days 10 capsule 0    omeprazole (PriLOSEC) 20 mg delayed release capsule Take 20 mg by mouth daily      ondansetron (ZOFRAN) 8 mg tablet Take 1 tablet (8 mg total) by mouth every 8 (eight) hours as needed for nausea or vomiting 30 tablet 1    oxybutynin (DITROPAN XL) 15 MG 24 hr tablet TAKE 1 TABLET BY MOUTH DAILY AT BEDTIME 90 tablet 3    oxyCODONE-acetaminophen (Percocet) 5-325 mg per tablet Take 1 tablet by mouth every 4 (four) hours as needed for moderate pain Max Daily Amount: 6 tablets 30 tablet 0    phenazopyridine (PYRIDIUM) 200 mg tablet Take 1 tablet (200 mg total) by mouth daily as needed for bladder spasms 30 tablet 0    rucaparib (RUBRACA) 300 mg tablet Take 600 mg by mouth every 12 (twelve) hours Take with or without food  Do not repeat a vomited dose   saccharomyces boulardii (FLORASTOR) 250 mg capsule Take 1 capsule (250 mg total) by mouth 2 (two) times a day  0    venlafaxine (EFFEXOR) 75 mg tablet Take 75 mg by mouth 3 (three) times a day         No current facility-administered medications for this visit  Objective:    Resp  rate 16, height 4' 11" (1 499 m), weight 55 1 kg (121 lb 8 oz), not currently breastfeeding  Body mass index is 24 54 kg/m²  Body surface area is 1 49 meters squared  Physical Exam  HENT:      Head: Normocephalic and atraumatic  Nose: Nose normal    Cardiovascular:      Rate and Rhythm: Normal rate and regular rhythm  Pulmonary:      Effort: Pulmonary effort is normal    Abdominal:      General: There is no distension  Palpations: Abdomen is soft  There is no mass  Genitourinary:     Comments: defer  Musculoskeletal:         General: No swelling  Normal range of motion  Cervical back: Normal range of motion  Skin:     General: Skin is warm and dry     Neurological:      General: No focal deficit present  Mental Status: She is alert     Psychiatric:         Mood and Affect: Mood normal            Lab Results   Component Value Date     10/27/2017    K 4 0 04/15/2022     04/15/2022    CO2 24 04/15/2022    BUN 17 04/15/2022    CREATININE 0 90 04/15/2022    GLUCOSE 219 (H) 12/19/2017    GLUF 89 04/15/2022    CALCIUM 9 4 04/15/2022    CORRECTEDCA 10 0 04/15/2022    AST 33 04/15/2022    ALT 41 04/15/2022    ALKPHOS 130 (H) 04/15/2022    PROT 6 9 10/27/2017    BILITOT 0 3 10/27/2017    EGFR 67 04/15/2022     Lab Results   Component Value Date    WBC 5 90 05/06/2022    HGB 10 2 (L) 05/06/2022    HCT 32 7 (L) 05/06/2022     (H) 05/06/2022     05/06/2022     Lab Results   Component Value Date    NEUTROABS 4 65 05/06/2022        Trend:  No results found for:

## 2022-05-06 NOTE — ASSESSMENT & PLAN NOTE
Imaging without residual disease in retroperitoneum  Will reach out to IR to cap PCN and attempt removal vs internalization

## 2022-05-06 NOTE — TELEPHONE ENCOUNTER
Left message per communication form advising patient of AP's note  Office number provided with any questions or concerns

## 2022-05-06 NOTE — PROGRESS NOTES
Assessment/Plan:    Problem List Items Addressed This Visit        Genitourinary    Endometrial cancer ([facility] [address] ) - Primary (Chronic)     79-year-old with recurrent stage IB endometrial cancer who is receiving treatment per the [facility] trial presents for pre cycle 25 visit  CBC, CMP, mag, reviewed and all hematologic parameters support continued treatment    CT with 1 5cm residual aortocaval LN remaining  Tolerating chemo with minimal toxicity    Proceed with cycle #25           Chronic UTI     Recurrent  Urology treating current            Other    Nephrostomy status [facility])     Imaging without residual disease in retroperitoneum  Will reach out to IR to cap PCN and attempt removal vs internalization  CHIEF COMPLAINT: pre chemo      Problem:  Cancer Staging  Endometrial cancer [facility])  Staging form: Corpus Uteri - Carcinoma, AJCC 7th Edition  - Clinical stage from 12/19/2017: FIGO Stage IB (T1b, N0, M0) - Signed by Claudean Fife, MD on 2/12/2018        Previous therapy:  Oncology History   Endometrial cancer ([facility] [address] )   11/17/2017 Initial Diagnosis    Endometrial cancer ([facility] [address] )     11/17/2017 Biopsy    ENDOMETRIAL BIOPSY: WELL DIFFERENTIATED ENDOMETRIAL ADENOCARCINOMA (FIGO I) WITH FOCALMUCINOUS FEATURES  Part B: ENDOCERVICAL POLYP:BENIGN ENDOCERVICAL      12/19/2017 Surgery    Robotic assisted total laparoscopic hysterectomy with bilateral salpingo-oophorectomy and sentinel bilateral pelvic lymph node dissection  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 (4 4 x 3 2 cm tumor, 9 4/15 4mm invasion, NO LVSI, washings revealed atypical cellular changes)     12/19/2017 Genetic Testing    Morrison testing negative     6/28/2019 Biopsy    A  Breast, Right, US BX Right Breast 1000 4 cmfn:  - Benign breast tissue with focal histiocytic aggregate  See comment   - Negative for atypia and in-situ or invasive carcinoma       7/8/2019 Recurrence    Presented with right lower extremity DVT and CT demonstrating right pelvic sidewall mass with venous, ureteral and nerve compression causing significant neuropathic pain  Biopsy:  Lymph Node, Right pelvic lymph node x3:  - High-grade adenocarcinoma  7/30/2019 - 1/6/2020 Chemotherapy    Taxol 175 mg/m2 and carboplatin AUC 6 every 21 days  Dose was reduced to taxol 135 mg/m2 and carboplatin AUC 5  Completed 6 cycles  Treatment protracted due to multiple hospital admissions  2/24/2020 - 4/13/2020 Radiation    adjuvant external beam radiation therapy to the whole pelvis to 4500 cGy followed by boost to gross disease of an additional 2200 cGy     11/23/2020 Progression    New necrotic adenopathy in the retroperitoneum on CT      12/21/2020 -  Chemotherapy    Began chemotherapy with rucaparib, atezolizumab, and bevacizumab as per [facility] clinical trial            Patient ID: Sukhi Hedrick is a 61 y o  female  72-year-old with recurrent stage IB endometrial cancer who is receiving treatment per the [facility] trial presents for pre cycle 25 visit  Pt reports improvement in her overall fatigue  +UTI symptoms for which urology sent culture  She continues to have a PCNU to drainage with follow-up for exchange with IR 5/10  She has intermittent constipation managed with medications  The following portions of the patient's history were reviewed and updated as appropriate: allergies, current medications, past family history, past medical history, past social history, past surgical history and problem list     Review of Systems   Constitutional: Positive for fatigue  Negative for appetite change, chills and fever  Respiratory: Negative for chest tightness and shortness of breath  Gastrointestinal: Positive for constipation  Negative for abdominal distention, abdominal pain, diarrhea and nausea  Genitourinary: Positive for frequency and urgency  Negative for difficulty urinating, flank pain, vaginal bleeding, vaginal discharge and vaginal pain  Musculoskeletal: Negative for back pain, joint swelling and myalgias  Skin: Negative for rash  Neurological: Negative for dizziness, light-headedness, numbness and headaches  Current Outpatient Medications   Medication Sig Dispense Refill    acetaminophen-codeine (TYLENOL #3) 300-30 mg per tablet Take 1 tablet by mouth every 6 (six) hours as needed for moderate pain 20 tablet 0    ARIPiprazole (ABILIFY) 10 mg tablet Take 20 mg by mouth daily        aspirin (ECOTRIN LOW STRENGTH) 81 mg EC tablet Take 81 mg by mouth daily      Calcium-Magnesium-Vitamin D (CALCIUM 500 PO) Take by mouth daily       cholecalciferol (VITAMIN D3) 1,000 units tablet Take 1,000 Units by mouth daily      clotrimazole-betamethasone (LOTRISONE) 1-0 05 % cream Apply topically 2 (two) times a day 30 g 0    Cyanocobalamin (VITAMIN B12 PO) Take by mouth daily       dronabinol (MARINOL) 2 5 mg capsule Take 1 capsule (2 5 mg total) by mouth 2 (two) times a day before meals (Patient taking differently: Take 2 5 mg by mouth 2 (two) times a day before meals As needed ) 60 capsule 0    enoxaparin (LOVENOX) 80 mg/0 8 mL Inject 0 8 mL (80 mg total) under the skin every 24 hours 30 mL 5    Fiber Adult Gummies 2 g CHEW Chew 1 tablet in the morning (Patient not taking: Reported on 4/14/2022 ) 90 tablet 1    folic acid (FOLVITE) 1 mg tablet Take 1 tablet (1 mg total) by mouth daily  0    gemfibrozil (LOPID) 600 mg tablet TAKE 1 TABLET BY MOUTH EVERY DAY 90 tablet 0    lactulose (CHRONULAC) 10 g/15 mL solution TAKE 45 ML (30 G TOTAL) BY MOUTH 2 (TWO) TIMES A  mL 2    LORazepam (ATIVAN) 0 5 mg tablet Take 1 tablet (0 5 mg total) by mouth every 6 (six) hours as needed for anxiety (or nausea) 36 tablet 1    Multiple Vitamin (MULTIVITAMIN) tablet Take 1 tablet by mouth daily      naloxone (NARCAN) 4 mg/0 1 mL nasal spray Administer 1 spray into a nostril   If no response after 2-3 minutes, give another dose in the other nostril using a new spray  1 each 0    nitrofurantoin (MACROBID) 100 mg capsule Take 1 capsule (100 mg total) by mouth 2 (two) times a day for 5 days 10 capsule 0    omeprazole (PriLOSEC) 20 mg delayed release capsule Take 20 mg by mouth daily      ondansetron (ZOFRAN) 8 mg tablet Take 1 tablet (8 mg total) by mouth every 8 (eight) hours as needed for nausea or vomiting 30 tablet 1    oxybutynin (DITROPAN XL) 15 MG 24 hr tablet TAKE 1 TABLET BY MOUTH DAILY AT BEDTIME 90 tablet 3    oxyCODONE-acetaminophen (Percocet) 5-325 mg per tablet Take 1 tablet by mouth every 4 (four) hours as needed for moderate pain Max Daily Amount: 6 tablets 30 tablet 0    phenazopyridine (PYRIDIUM) 200 mg tablet Take 1 tablet (200 mg total) by mouth daily as needed for bladder spasms 30 tablet 0    rucaparib (RUBRACA) 300 mg tablet Take 600 mg by mouth every 12 (twelve) hours Take with or without food  Do not repeat a vomited dose   saccharomyces boulardii (FLORASTOR) 250 mg capsule Take 1 capsule (250 mg total) by mouth 2 (two) times a day  0    venlafaxine (EFFEXOR) 75 mg tablet Take 75 mg by mouth 3 (three) times a day         No current facility-administered medications for this visit  Objective:    Resp  rate 16, height 4' 11" (1 499 m), weight 55 1 kg (121 lb 8 oz), not currently breastfeeding  Body mass index is 24 54 kg/m²  Body surface area is 1 49 meters squared  Physical Exam  HENT:      Head: Normocephalic and atraumatic  Nose: Nose normal    Cardiovascular:      Rate and Rhythm: Normal rate and regular rhythm  Pulmonary:      Effort: Pulmonary effort is normal    Abdominal:      General: There is no distension  Palpations: Abdomen is soft  There is no mass  Genitourinary:     Comments: defer  Musculoskeletal:         General: No swelling  Normal range of motion  Cervical back: Normal range of motion  Skin:     General: Skin is warm and dry     Neurological:      General: No focal deficit present  Mental Status: She is alert     Psychiatric:         Mood and Affect: Mood normal            Lab Results   Component Value Date     10/27/2017    K 4 0 04/15/2022     04/15/2022    CO2 24 04/15/2022    BUN 17 04/15/2022    CREATININE 0 90 04/15/2022    GLUCOSE 219 (H) 12/19/2017    GLUF 89 04/15/2022    CALCIUM 9 4 04/15/2022    CORRECTEDCA 10 0 04/15/2022    AST 33 04/15/2022    ALT 41 04/15/2022    ALKPHOS 130 (H) 04/15/2022    PROT 6 9 10/27/2017    BILITOT 0 3 10/27/2017    EGFR 67 04/15/2022     Lab Results   Component Value Date    WBC 5 90 05/06/2022    HGB 10 2 (L) 05/06/2022    HCT 32 7 (L) 05/06/2022     (H) 05/06/2022     05/06/2022     Lab Results   Component Value Date    NEUTROABS 4 65 05/06/2022        Trend:  No results found for:

## 2022-05-06 NOTE — PROGRESS NOTES
Pt  Resting Comfortably  Port accessed, labs drawn, port saline-locked and deaccessed without issue  Pt tolerated procedure well  Pt unable to give urine specimen at this time  Orders and container given to patient to collect at home and bring to closest [facility]  Verbalized understanding  Aware of next appt  AVS declined

## 2022-05-06 NOTE — ASSESSMENT & PLAN NOTE
68-year-old with recurrent stage IB endometrial cancer who is receiving treatment per the [facility] trial presents for pre cycle 25 visit  CBC, CMP, mag, reviewed and all hematologic parameters support continued treatment    CT with 1 5cm residual aortocaval LN remaining    Tolerating chemo with minimal toxicity    Proceed with cycle #25

## 2022-05-07 ENCOUNTER — APPOINTMENT (OUTPATIENT)
Dept: LAB | Facility: CLINIC | Age: 67
End: 2022-05-07
Payer: COMMERCIAL

## 2022-05-07 LAB
BACTERIA UR QL AUTO: ABNORMAL /HPF
BILIRUB UR QL STRIP: ABNORMAL
CLARITY UR: CLEAR
COLOR UR: YELLOW
CREAT UR-MCNC: 9.4 MG/DL
GLUCOSE UR STRIP-MCNC: NEGATIVE MG/DL
HGB UR QL STRIP.AUTO: NEGATIVE
HYALINE CASTS #/AREA URNS LPF: ABNORMAL /LPF
KETONES UR STRIP-MCNC: ABNORMAL MG/DL
LEUKOCYTE ESTERASE UR QL STRIP: ABNORMAL
MUCOUS THREADS UR QL AUTO: ABNORMAL
NITRITE UR QL STRIP: NEGATIVE
NON-SQ EPI CELLS URNS QL MICRO: ABNORMAL /HPF
PH UR STRIP.AUTO: 6 [PH]
PROT UR STRIP-MCNC: NEGATIVE MG/DL
PROT UR-MCNC: 12 MG/DL
PROT/CREAT UR: 1.28 MG/G{CREAT} (ref 0–0.1)
RBC #/AREA URNS AUTO: ABNORMAL /HPF
SP GR UR STRIP.AUTO: >=1.03 (ref 1–1.03)
UROBILINOGEN UR QL STRIP.AUTO: 0.2 E.U./DL
WBC #/AREA URNS AUTO: ABNORMAL /HPF

## 2022-05-07 PROCEDURE — 84156 ASSAY OF PROTEIN URINE: CPT

## 2022-05-07 PROCEDURE — 82570 ASSAY OF URINE CREATININE: CPT

## 2022-05-07 PROCEDURE — 81001 URINALYSIS AUTO W/SCOPE: CPT

## 2022-05-09 ENCOUNTER — DOCUMENTATION (OUTPATIENT)
Dept: OTHER | Facility: HOSPITAL | Age: 67
End: 2022-05-09

## 2022-05-09 ENCOUNTER — NUTRITION (OUTPATIENT)
Dept: NUTRITION | Facility: CLINIC | Age: 67
End: 2022-05-09

## 2022-05-09 ENCOUNTER — TELEPHONE (OUTPATIENT)
Dept: FAMILY MEDICINE CLINIC | Facility: CLINIC | Age: 67
End: 2022-05-09

## 2022-05-09 ENCOUNTER — HOSPITAL ENCOUNTER (OUTPATIENT)
Dept: INFUSION CENTER | Facility: CLINIC | Age: 67
Discharge: HOME | End: 2022-05-09
Payer: COMMERCIAL

## 2022-05-09 VITALS
WEIGHT: 123.5 LBS | TEMPERATURE: 97.3 F | HEART RATE: 52 BPM | OXYGEN SATURATION: 98 % | DIASTOLIC BLOOD PRESSURE: 79 MMHG | HEIGHT: 59 IN | RESPIRATION RATE: 18 BRPM | BODY MASS INDEX: 24.9 KG/M2 | SYSTOLIC BLOOD PRESSURE: 146 MMHG

## 2022-05-09 DIAGNOSIS — E78.5 DYSLIPIDEMIA: ICD-10-CM

## 2022-05-09 DIAGNOSIS — K12.30 MUCOSITIS: Primary | ICD-10-CM

## 2022-05-09 DIAGNOSIS — Z71.3 NUTRITIONAL COUNSELING: Primary | ICD-10-CM

## 2022-05-09 PROCEDURE — 96417 CHEMO IV INFUS EACH ADDL SEQ: CPT

## 2022-05-09 PROCEDURE — J9999Q0 INV ATEZOLIZUMAB 1200MG/20ML 20 ML: Performed by: OBSTETRICS & GYNECOLOGY

## 2022-05-09 PROCEDURE — J9035Q0 INV BEVACIZUMAB 400MG/16ML 16 ML: Performed by: OBSTETRICS & GYNECOLOGY

## 2022-05-09 PROCEDURE — 96413 CHEMO IV INFUSION 1 HR: CPT

## 2022-05-09 RX ORDER — PREDNISONE 1 MG/1
TABLET ORAL
Qty: 14 TABLET | Refills: 0 | Status: SHIPPED | OUTPATIENT
Start: 2022-05-09 | End: 2022-05-21

## 2022-05-09 RX ADMIN — SODIUM CHLORIDE 20 ML/HR: 0.9 INJECTION, SOLUTION INTRAVENOUS at 08:25

## 2022-05-09 RX ADMIN — Medication 878 MG: at 09:37

## 2022-05-09 RX ADMIN — Medication 1200 MG: at 08:54

## 2022-05-09 NOTE — TELEPHONE ENCOUNTER
Patient called and requested a script for a mammogram be placed in her chart to schedule  Patient is aware you are out of office until Wednesday

## 2022-05-09 NOTE — PATIENT INSTRUCTIONS
Nutrition Rx & Recommendations:  · Diet: High Calorie, High Protein (for high calorie foods see pages 52-53, and for high protein foods see pages 49-51 in your Eating Hints book)  · Small, frequent meals/snacks may be easier to tolerate than 3 large daily meals  Aim for 5-6 small meals per day (every 2-3 hours)  · Include protein at all meals/snacks  · Avoid spicy, acidic, sharp/hard/crunchy foods & carbonated beverages as needed  · Follow proper oral care; Try baking soda/salt water rinse recipe (mix 3/4 tsp salt + 1 tsp baking soda + 1 qt water; rinse with plain water after using) in Eating Hints book (pg 18)  Brush your teeth before/after meals & before bed  · For appetite loss: try powdered or liquid nutrition supplements; eating by the clock every 2-3 hours; set a timer to remind yourself to eat, keep snacks nearby; add extra protein & calories to your diet; drink liquids in between meals, choose liquids that add calories; eat a bedtime snack; eat soft, cool or frozen foods; eat a larger meal when you feel well & rested; only sip small amounts of liquids during meals (see pages 10-12 in your Eating Hints book)  · For weight loss: monitor your weight at home at least 2x/week, record your weight, start by adding 250-500 extra calories per day, eat 5-6 small scheduled meals every 2-3 hrs, choose foods that are high in protein and calories (see pages 49-53 in your Eating Hints book), drink liquids with calories for example: milkshakes/smoothies/juice/soup/whole milk/chocolate milk, cook with protein fortified milk (see recipe on page 36 in your Eating Hints book), consider ready-to-drink oral nutrition supplements such as Ensure Plus, Ensure Enlive, Boost Plus, or Boost Very High Calorie, avoid "diet" and "light" foods when possible, avoid drinking too much with meals, contact your dietitian with any continued weight loss over the course of 1 week    For more info see pages 35-37 in your Eating Hints book   · For constipation: drink plenty of fluids (>64 oz/day); drink hot liquids; eat high-fiber foods as tolerated (whole grains, beans, peas, nuts, seeds, fruits, vegetables, etc); increase physical activity as tolerated  Avoid increasing fiber intake too quickly, add fiber into your diet slowly; keep a record of your bowel movements (see pages 13-14 in you Eating Hints book)  · For sore mouth/throat: choose foods that are easy to chew/swallow; cook foods until they are soft/tender; moisten & soften foods (gravy/sauce/broth/yogurt); cut food into small pieces; drink with a straw (as tolerated); eat with a very small spoon; eat cold or room temperature food; suck on ice chips; Avoid citrus, spicy foods, tomatoes/ketchup, salty foods, raw vegetables, sharp/crunchy/hard foods & alcohol (see pages 23-28 in your Eating Hints book)  · For dry mouth: sip water throughout the day; try very sweet (or tart, as tolerated) drinks; chew gum or suck on hard candy, popsicles, & ice chips; eat easy to swallow foods (such as pureed cooked foods or soups); moisten food with sauce/gravy/salad dressing; do not drink beer, wine, or any type of alcohol; keep lips moist with lip balm; avoid tobacco products & second-hand smoke; try Biotene as needed (see pages 17-18 in your Eating Hints book)  Follow proper oral care; Try baking soda/salt water rinse recipe (mix 3/4 tsp salt + 1 tsp baking soda + 1 qt water; rinse with pain water after using) in Eating Hints book (pg 18)  Brush your teeth before/after meals & before bed  · Weigh yourself regularly  If you notice weight loss, make an effort to increase your daily food/calorie intake  If you continue to notice loss after these efforts, reach out to your dietitian to establish a plan to stabilize weight     · Snack ideas: [place] yogurt with fruit, pudding, veggies with humus OR try greek yogurt based vegetable dip, peanut butter, grapes and cheese, fruit with a cheese stick , cottage cheese, soup, unjury, protein shakes/smoothies  · Continue to keep a food journal as long as it is helpful  · Re-start apple prune sauce for constipation  · Try to eat something every 1 hr, set alarm on your phone as a reminder    Patient choosen goals:  · Increase protein intake by adding protein to snacks  · Scoop of pb, 1/2 Premier, cheese, protein wafers, eggs, Greek yogurt  · Nutrition Supplements:    · Continue Premier Protein Shake 1X/day - try mixing with Fairlife milk or putting it over ice to reduce sweetness  · Add Unjury protein powder to foods as needed    Follow Up Plan: 6/20 during infusion   Recommend Referral to Other Providers: none at this time

## 2022-05-09 NOTE — PROGRESS NOTES
Patient here for D1C25 treatment  Patient resting with no complaints  Vitals stable  Labs reviewed for treatment and signed off by Dr Marnie Rock  Patient currently on antibiotic ordered by urology for chronic UTI, per office note Dr Marnie Rock aware of this and okay to proceed with C25 today  [facility] within reach of patient

## 2022-05-09 NOTE — PROGRESS NOTES
Patient was seen in the office on 5/6/2022 for her Cycle 25 Day 1 pretreatment office visit for [facility] with Dr Tristin Contreras  Patient states that she is doing well   Patient states she feels better than she has, her energy is up as well as her appetite  She still gets occasional mouth sores that she uses biotene mouth wash as well as a salt water rinse that helps  Patient did state that she has a UTI but is managed by urology for this  All AEs and Conmeds were reviewed   Future appointments were reviewed  Overall, patient states that she is feeling okay, and was told to call with any questions or concerns

## 2022-05-09 NOTE — PROGRESS NOTES
Labs have been reviewed and signed by Gasper Johns  on 5/06/2022   Per protocol, patient is ok to proceed with Cycle 25 Day 1 treatment on [facility] 5/09/22

## 2022-05-09 NOTE — PROGRESS NOTES
Patient was seen in [facility] for [facility] 1 treatment  Patient was given Cycle 25 Rucaparib pills and drug diary, and patient returned Cycle 24 study pills, and diary  Patient denied any changes to conmeds since office visit on 5/06/22   Patient did state that her mouth sores are back the mouth wash isn't helping this time  CRN spoke to [address]  and she sent rx for low dose steroid taper  Per protocol dose is less than or equal to 10mg po daily then tapers down  She states that she is feeling well overall   Patient was told to call with any questions or concerns, Patient tolerates treatment without incident

## 2022-05-10 ENCOUNTER — HOSPITAL ENCOUNTER (OUTPATIENT)
Dept: RADIOLOGY | Facility: HOSPITAL | Age: 67
Discharge: HOME | End: 2022-05-10
Attending: RADIOLOGY | Admitting: INTERNAL MEDICINE
Payer: COMMERCIAL

## 2022-05-10 VITALS
RESPIRATION RATE: 17 BRPM | TEMPERATURE: 98.4 F | SYSTOLIC BLOOD PRESSURE: 175 MMHG | HEIGHT: 59 IN | BODY MASS INDEX: 24.8 KG/M2 | OXYGEN SATURATION: 99 % | HEART RATE: 67 BPM | WEIGHT: 123 LBS | DIASTOLIC BLOOD PRESSURE: 77 MMHG

## 2022-05-10 DIAGNOSIS — Z12.39 ENCOUNTER FOR SCREENING FOR MALIGNANT NEOPLASM OF BREAST, UNSPECIFIED SCREENING MODALITY: Primary | ICD-10-CM

## 2022-05-10 DIAGNOSIS — N13.5 OBSTRUCTION OF RIGHT URETER: Primary | ICD-10-CM

## 2022-05-10 PROCEDURE — C1729 CATH, DRAINAGE: HCPCS

## 2022-05-10 PROCEDURE — 50435 EXCHANGE NEPHROSTOMY CATH: CPT | Performed by: INTERNAL MEDICINE

## 2022-05-10 PROCEDURE — C1769 GUIDE WIRE: HCPCS

## 2022-05-10 PROCEDURE — 50435 EXCHANGE NEPHROSTOMY CATH: CPT

## 2022-05-10 RX ORDER — CEFAZOLIN SODIUM 1 G/3ML
INJECTION, POWDER, FOR SOLUTION INTRAMUSCULAR; INTRAVENOUS CODE/TRAUMA/SEDATION MEDICATION
Status: COMPLETED | OUTPATIENT
Start: 2022-05-10 | End: 2022-05-10

## 2022-05-10 RX ORDER — MIDAZOLAM HYDROCHLORIDE 2 MG/2ML
INJECTION, SOLUTION INTRAMUSCULAR; INTRAVENOUS CODE/TRAUMA/SEDATION MEDICATION
Status: COMPLETED | OUTPATIENT
Start: 2022-05-10 | End: 2022-05-10

## 2022-05-10 RX ORDER — CEFAZOLIN SODIUM 1 G/50ML
1000 SOLUTION INTRAVENOUS ONCE
Status: DISCONTINUED | OUTPATIENT
Start: 2022-05-10 | End: 2022-05-11 | Stop reason: HOSPADM

## 2022-05-10 RX ORDER — GEMFIBROZIL 600 MG/1
TABLET, FILM COATED ORAL
Qty: 90 TABLET | Refills: 0 | Status: SHIPPED | OUTPATIENT
Start: 2022-05-10

## 2022-05-10 RX ORDER — FENTANYL CITRATE 50 UG/ML
INJECTION, SOLUTION INTRAMUSCULAR; INTRAVENOUS CODE/TRAUMA/SEDATION MEDICATION
Status: COMPLETED | OUTPATIENT
Start: 2022-05-10 | End: 2022-05-10

## 2022-05-10 RX ORDER — SODIUM CHLORIDE 9 MG/ML
75 INJECTION, SOLUTION INTRAVENOUS CONTINUOUS
Status: DISCONTINUED | OUTPATIENT
Start: 2022-05-10 | End: 2022-05-11 | Stop reason: HOSPADM

## 2022-05-10 RX ADMIN — CEFAZOLIN 1000 MG: 1 INJECTION, POWDER, FOR SOLUTION INTRAMUSCULAR; INTRAVENOUS at 10:58

## 2022-05-10 RX ADMIN — FENTANYL CITRATE 50 MCG: 50 INJECTION INTRAMUSCULAR; INTRAVENOUS at 11:12

## 2022-05-10 RX ADMIN — MIDAZOLAM 1 MG: 1 INJECTION INTRAMUSCULAR; INTRAVENOUS at 11:12

## 2022-05-10 RX ADMIN — IOHEXOL 7 ML: 350 INJECTION, SOLUTION INTRAVENOUS at 11:34

## 2022-05-10 RX ADMIN — MIDAZOLAM 0.5 MG: 1 INJECTION INTRAMUSCULAR; INTRAVENOUS at 11:32

## 2022-05-10 RX ADMIN — FENTANYL CITRATE 50 MCG: 50 INJECTION INTRAMUSCULAR; INTRAVENOUS at 11:32

## 2022-05-10 NOTE — DISCHARGE INSTRUCTIONS
Procedural Sedation   WHAT YOU NEED TO KNOW:   Procedural sedation is medicine used during procedures to help you feel relaxed and calm  You will remember little to none of the procedure  After sedation you may feel tired, weak, or unsteady on your feet  You may also have trouble concentrating or short-term memory loss  These symptoms should go away in 24 hours or less  DISCHARGE INSTRUCTIONS:   Call 911 or have someone else call for any of the following:   · You have sudden trouble breathing      · You cannot be woken  ·    Contact Interventional Radiology at [phone] PATIENTS: Contact Interventional Radiology at [phone] [facility] PATIENTS: Contact Interventional Radiology at [phone]) if any of the following occur:      · You have a severe headache or dizziness      · Your heart is beating faster than usual     · You have a fever or chills      · Your skin is itchy, swollen, or you have a rash      · You have nausea or are vomiting for more than 8 hours after the procedure       · You have questions or concerns about your condition or care  Self-care:   · Have someone stay with you for 24 hours  This person can drive you to errands and help you do things around the house  This person can also watch for problems       · Rest and do quiet activities for 24 hours  Do not exercise, ride a bike, or play sports  Stand up slowly to prevent dizziness and falls  Take short walks around the house with another person  Slowly return to your usual activities the next day       · Do not drive or use dangerous machines or tools for 24 hours  You may injure yourself or others  Examples include a lawnmower, saw, or drill  Do not return to work for 24 hours if you use dangerous machines or tools for work       · Do not make important decisions for 24 hours  For example, do not sign important papers or invest money       · Drink liquids as directed  Liquids help flush the sedation medicine out of your body  Ask how much liquid to drink each day and which liquids are best for you       · Eat small, frequent meals to prevent nausea and vomiting  Start with clear liquids such as juice or broth  If you do not vomit after clear liquids, you can eat your usual foods       · Do not drink alcohol or take medicines that make you drowsy  This includes medicines that help you sleep and anxiety medicines  Ask your healthcare provider if it is safe for you to take pain medicine  Follow up with your healthcare provider as directed: Write down your questions so you remember to ask them during your visits  Nephrostomy Tube Care     WHAT YOU NEED TO KNOW:   A nephrostomy tube is a catheter (thin plastic tube) that is inserted through your skin and into your kidney  The nephrostomy tube drains urine from your kidney into a collecting bag outside your body  You may need a nephrostomy tube when something is blocking the normal flow of urine  A nephrostomy tube may be used for a short or a long period of time  The nephrostomy tube comes out of your back, so you will need someone to help care for your nephrostomy tube  DISCHARGE INSTRUCTIONS:      How to clean the skin around the nephrostomy tube and change the bandage:  Since the nephrostomy tube comes out of your back, you will not be able to care for it by yourself  Ask someone to follow the general directions below to check and care for your nephrostomy tube  Gather the items you will need  Disposable (single use) under-pad, and a clean washcloth  ¨ Plain soap, warm water, and new medical gloves  ¨ Sterile gauze bandages  ¨ Clear adhesive dressing or medical tape  ¨ Skin barrier  ¨ Protective skin film  ¨ Trash bag  · Remove the old bandage, and check the tube entry site  ¨ Have the patient lie on his side with the nephrostomy tube entry site facing up  Place the under-pad where it will catch drainage as you are working with the nephrostomy tube     [facility] [place] Wash your hands with soap and water  Put on new medical gloves  ¨ Gently remove the old bandage, without pulling on the tube  Do this by holding the skin beside the tube with one hand  With the other hand, gently remove sticky tape and the skin barrier by pulling in the same direction as hair growth  Do not touch the side of the bandage that is placed over or around the tube  Throw the bandage and skin barrier away in a trash bag  ¨ Look for signs of infection, such as skin redness and swelling  Report any skin changes to healthcare providers  ¨ Clean the tube entry site  ¨ Hold the tube in place to keep it from being pulled out while you are cleaning around it  ¨ You will need to clean the area twice  For the first cleaning, wet a new gauze bandage with soap and water  Begin at the entry site of the tube  Wipe the skin in circles, moving away from the entry site  Remove blood and any other material with the gauze  Do this as often as needed  Use a new gauze bandage each time you clean the area, moving away from the entry site  ¨ For the second cleaning, wet a new gauze bandage with water  Begin at the entry site of the tube  Wipe the skin in circles, moving away from the entry site  Use a new gauze bandage each time you clean the area, moving away from the entry site  ¨ Gently pat the skin with a clean washcloth to dry it  · Apply the skin barrier and bandages  ¨ Roll up a bandage to make it thick, and place it under  the place where the tube enters the skin  Place it to support the tube, and stop it from kinking or bending  Tape the bandage in place, and apply more bandages if directed by a healthcare provider  ¨ Bring the tubing forward to the front and tape it to the skin  Do not stretch the tube tight, because this may pull the nephrostomy tube out  How often to change the bandage  Change the bandage around the tube, every other day   If your bandages  get dirty or wet, change them right away, and as often as needed  If your nephrostomy tube is to be used for a long period of time, the tube needs to be changed every 2 to 3 months  Healthcare providers will tell you when you need to make an appointment to have your tube changed  How to care for the urine drainage bag:   · Ask if you need to measure and write down how much urine is in the bag before you empty it  Drain urine out of the drainage bag when it is ½ to ? full  Open the spout at the bottom of the bag to empty the urine into the toilet  · You may need to detach the drainage bag from the nephrostomy tube to change it    If so, attach a new drainage bag tightly to the nephrostomy tube  ·   How to prevent problems with your nephrostomy tube:   · Change bandages, directed  This helps to prevent infection  Throw away or clean your drainage bag as directed by your healthcare provider  · Wipe the connecting ends of the drainage bag with alcohol before you reconnect the bag to the tube  This helps prevent infection  Keep the tube taped to your skin and connected to a drainage bag placed below the level of your kidneys  This helps prevent urine from backing up into your kidneys  You may wear a small drainage bag strapped to your leg to let you move around more easily  · Check the catheter to be sure it is in place after you change your clothes or do other activities  Do not wear tight clothing over the tube  Place the tubing over your thigh rather than under it when you are sitting down  Be sure that nothing is pulling on the nephrostomy tube when you move around  · Change positions if you see little or no urine in your drainage bag  Check to see if the urine tube is twisted or bent  Be sure that you are not sitting or lying on the tube  If there are no kinks and there is little or no urine in the drainage bag, tell your healthcare provider  · Flush out the tube as directed   Some tubes get flushed one time a day with 10 mls of NSS You will be given a prescription for the flushes  To flush the nephrostomy tube, clean both connections with alcohol swap  Twist off the drainage bag tube and twist the saline syringe into the nephrostomy tube and flush briskly  Remove the syringe and twist the drainage bag tube back into the nephrostomy tube  · Keep the site covered while you shower  Tape a piece of clear adhesive plastic over the dressing to keep it dry while you shower  Do not take tub baths  Contact Interventional Radiology at [phone] Angelique PATIENTS: Contact Interventional Radiology at [phone]) [facility] PATIENTS: Contact Interventional Radiology at [phone]) if:  · The skin around the nephrostomy tube is red, swollen, itches, or has a rash  · You have a fever greater than 101 or chills  · You have lower back or hip pain  · There are changes in how your urine looks or smells  · You have little or no urine draining from the nephrostomy tube  · You have nausea and are vomiting  · The black bernardo on your tube has moved, or the tube is longer than when it was put in    · You have questions or concerns about your condition or care  · The nephrostomy tube comes out completely  · There is blood, pus, or a bad smell coming from the place where the tube enters your skin  · Urine is leaking around the tube  The following pharmacies carry the flush syringes  [facility]                     [facility]       [address]                    [address] [facility]  Phone [phone]            Phone [phone]   Allie Sargent [address]                      [address]                                 [phone]  Phone [phone]            Phone [phone]    [facility]                                                                         [facility] [phone]  [address]   Phone [phone]

## 2022-05-10 NOTE — INTERVAL H&P NOTE
Update: (This section must be completed if the H&P was completed greater than 24 hrs to procedure or admission)    H&P reviewed  After examining the patient, I find no changed to the H&P since it had been written  Patient re-evaluated  Accept as history and physical     We will plan for right anterograde nephrostogram today, with possible exchange for a PCN to initiate capping trial if the right ureter appears patent      Lucio Law MD/May 10, 2022/11:03 AM

## 2022-05-10 NOTE — SEDATION DOCUMENTATION
Nephrostomy stent exchange completed by Dr Little Guillen without complications  Capping trial post procedure, pt sent home with collection bag, pt is to return in one month for follow up

## 2022-05-18 ENCOUNTER — OFFICE VISIT (OUTPATIENT)
Dept: BARIATRICS | Facility: CLINIC | Age: 67
End: 2022-05-18
Payer: COMMERCIAL

## 2022-05-18 VITALS
TEMPERATURE: 97.1 F | HEIGHT: 59 IN | DIASTOLIC BLOOD PRESSURE: 70 MMHG | HEART RATE: 67 BPM | SYSTOLIC BLOOD PRESSURE: 120 MMHG | WEIGHT: 121 LBS | BODY MASS INDEX: 24.39 KG/M2

## 2022-05-18 DIAGNOSIS — K59.03 DRUG INDUCED CONSTIPATION: ICD-10-CM

## 2022-05-18 DIAGNOSIS — K91.2 POSTSURGICAL MALABSORPTION: ICD-10-CM

## 2022-05-18 DIAGNOSIS — I82.4Y1 ACUTE DEEP VEIN THROMBOSIS (DVT) OF PROXIMAL VEIN OF RIGHT LOWER EXTREMITY (HCC): ICD-10-CM

## 2022-05-18 DIAGNOSIS — C54.1 ENDOMETRIAL CANCER (HCC): Chronic | ICD-10-CM

## 2022-05-18 DIAGNOSIS — I10 BENIGN ESSENTIAL HYPERTENSION: ICD-10-CM

## 2022-05-18 DIAGNOSIS — Z48.815 ENCOUNTER FOR SURGICAL AFTERCARE FOLLOWING SURGERY OF DIGESTIVE SYSTEM: Primary | ICD-10-CM

## 2022-05-18 DIAGNOSIS — Z98.84 BARIATRIC SURGERY STATUS: ICD-10-CM

## 2022-05-18 PROCEDURE — 1036F TOBACCO NON-USER: CPT | Performed by: PHYSICIAN ASSISTANT

## 2022-05-18 PROCEDURE — 99214 OFFICE O/P EST MOD 30 MIN: CPT | Performed by: PHYSICIAN ASSISTANT

## 2022-05-18 RX ORDER — ARIPIPRAZOLE 20 MG/1
20 TABLET ORAL DAILY
COMMUNITY
Start: 2022-02-10

## 2022-05-18 NOTE — PATIENT INSTRUCTIONS
Follow-up in 6 months  We kindly ask that your arrive 15 minutes before your scheduled appointment time with your provider to allow our staff to room you, get your vital signs and update your chart  Call our office if you have any problems with abdominal pain especially associated with fever, chills, nausea, vomiting or any other concerns  All  Post-bariatric surgery patients should be aware that very small quantities of any alcohol can cause impairment and it is very possible not to feel the effect  The effect can be in the system for several hours  It is also a stomach irritant  It is advised to AVOID alcohol, Nonsteroidal antiinflammatory drugs (NSAIDS) and nicotine of all forms   Any of these can cause stomach irritation/pain  Discussed the effects of alcohol on a bariatric patient and the increased impairment risk  Keep up the good work!

## 2022-05-18 NOTE — PROGRESS NOTES
Assessment/Plan:     Patient ID: Peg Gross is a 61 y o  female  Bariatric Surgery Status    s/p Edgardo-En-Y Gastric Bypass with Dr Vita Junior on in 2001  Patient with weight loss over the past 2 years due to decrease appetite; she has recurrent stage IB endometrial cancer who is receiving treatment per the [facility] trial presents for pre cycle 25 visit  She has nephrostomy tube  Recent imaging  without residual disease in retroperitoneum  Per patient tube may be removed soon    She has seen our RD and also follows with her oncology dietician and her weight has been stable in the 120-125lbs  BMI 24  For now she will continue seeing her oncology dietician  Once she is off the chemo, will reach back out to our RD to help with diet to prevent weight gain  Follow up 6 months    · Continued/Maintain healthy weight loss with good nutrition intakes  · Adequate hydration with at least 64oz  fluid intake  · Follow diet as discussed  · Follow vitamin and mineral recommendations as reviewed with you  · Exercise as tolerated  ·      · Colonoscopy referral made: unsure - advised to follow up with PCP or oncology on their recommendations  · Mammogram - UTD, scheduled         · Follow-up in 6 months  We kindly ask that your arrive 15 minutes before your scheduled appointment time with your provider to allow our staff to room you, get your vital signs and update your chart  ·   · Call our office if you have any problems with abdominal pain especially associated with fever, chills, nausea, vomiting or any other concerns  · All  Post-bariatric surgery patients should be aware that very small quantities of any alcohol can cause impairment and it is very possible not to feel the effect  The effect can be in the system for several hours  It is also a stomach irritant  · It is advised to AVOID alcohol, Nonsteroidal antiinflammatory drugs (NSAIDS) and nicotine of all forms    Any of these can cause stomach irritation/pain  · Discussed the effects of alcohol on a bariatric patient and the increased impairment risk  · Keep up the good work! Postsurgical Malabsorption   -At risk for malabsorption of vitamins/minerals secondary to malabsorption and restriction of intake from bariatric surgery  -Currently taking adequate postop bariatric surgery vitamin supplementation  -Last set of bariatric labs completed 11/2021 - refer to [facility] - wnl but PTH high likely related to her CA, advised follow up with her oncology dietician if they have any concerns on this   -Patient received education about the importance of adhering to a lifelong supplementation regimen to avoid vitamin/mineral deficiencies      Diagnoses and all orders for this visit:    Encounter for surgical aftercare following surgery of digestive system    Bariatric surgery status    Postsurgical malabsorption    Endometrial cancer ([facility] [address] )    Benign essential hypertension    Drug induced constipation    Acute deep vein thrombosis (DVT) of proximal vein of right lower extremity ([facility] [address] )    Other orders  -     ARIPiprazole (ABILIFY) 20 MG tablet; Take 20 mg by mouth in the morning  Subjective:      Patient ID: Daniele Durham is a 61 y o  female  s/p Edgardo-En-Y Gastric Bypass with Dr Liz Beal on in 2001  Patient with weight loss over the past 2 years due to decrease appetite; she has recurrent stage IB endometrial cancer who is receiving treatment per the [facility] trial presents for pre cycle 25 visit  Current:121  EWL: (Weight loss is ahead of schedule at this post surgical period )  Current BMI is Body mass index is 24 86 kg/m²      · Tolerating a regular diet-yes  · Eating at least 60 grams of protein per day-yes  · Following 30/60 minute rule with liquids-yes  · Drinking at least 64 ounces of fluid per day-yes  · Drinking carbonated beverages-no  · Sufficient exercise-no  · Using NSAIDs regularly-baby aspirin daily, on PPI   · Using nicotine-no  · Using alcohol-no  · Supplements:  1 tab MVI + calcium + iron+ + Vitamin D + B12     · EWL is 101%, which places the patient ahead of schedule for expected post surgical weight loss at this time  The following portions of the patient's history were reviewed and updated as appropriate: allergies, current medications, past family history, past medical history, past social history, past surgical history and problem list     Review of Systems   Constitutional: Negative  Respiratory: Negative  Cardiovascular: Negative  Gastrointestinal: Positive for constipation (occasional )  Negative for abdominal pain, diarrhea, nausea and vomiting  Musculoskeletal: Negative  Skin: Negative  Neurological: Negative  Psychiatric/Behavioral: Negative  Mood much improved          Objective:    /70 (BP Location: Left arm, Patient Position: Sitting, Cuff Size: Standard)   Pulse 67   Temp (!) 97 1 °F (36 2 °C) (Tympanic)   Ht 4' 10 5" (1 486 m)   Wt 54 9 kg (121 lb)   LMP  (LMP Unknown)   BMI 24 86 kg/m²      Physical Exam  Vitals and nursing note reviewed  Constitutional:       Appearance: Normal appearance  HENT:      Head: Normocephalic and atraumatic  Eyes:      Extraocular Movements: Extraocular movements intact  Pupils: Pupils are equal, round, and reactive to light  Cardiovascular:      Rate and Rhythm: Normal rate and regular rhythm  Pulmonary:      Effort: Pulmonary effort is normal       Breath sounds: Normal breath sounds  Abdominal:      General: Bowel sounds are normal       Tenderness: There is no abdominal tenderness  Musculoskeletal:         General: Normal range of motion  Cervical back: Normal range of motion  Skin:     General: Skin is warm and dry  Neurological:      General: No focal deficit present  Mental Status: She is alert and oriented to person, place, and time     Psychiatric:         Mood and Affect: Mood normal

## 2022-05-23 ENCOUNTER — TELEPHONE (OUTPATIENT)
Dept: OTHER | Facility: OTHER | Age: 67
End: 2022-05-23

## 2022-05-25 DIAGNOSIS — C54.1 ENDOMETRIAL CANCER (HCC): Primary | ICD-10-CM

## 2022-05-25 NOTE — TELEPHONE ENCOUNTER
ES DO saw patient 3 times and is being discharged today  Patient feels she does not need their services  Patient is following up with cancer doctor who will facilitate chemo and follow her closely  Quality 226: Preventive Care And Screening: Tobacco Use: Screening And Cessation Intervention: Patient screened for tobacco use and is an ex/non-smoker Quality 265: Biopsy Follow-Up: Biopsy results reviewed, communicated, tracked, and documented Quality 111:Pneumonia Vaccination Status For Older Adults: Pneumococcal Vaccination Previously Received Quality 130: Documentation Of Current Medications In The Medical Record: Current Medications Documented Quality 431: Preventive Care And Screening: Unhealthy Alcohol Use - Screening: Patient not identified as an unhealthy alcohol user when screened for unhealthy alcohol use using a systematic screening method Quality 110: Preventive Care And Screening: Influenza Immunization: Influenza Immunization Administered during Influenza season Detail Level: Detailed

## 2022-05-26 ENCOUNTER — APPOINTMENT (OUTPATIENT)
Dept: LAB | Facility: CLINIC | Age: 67
End: 2022-05-26
Payer: COMMERCIAL

## 2022-05-26 ENCOUNTER — HOSPITAL ENCOUNTER (OUTPATIENT)
Dept: INFUSION CENTER | Facility: CLINIC | Age: 67
Discharge: HOME | End: 2022-05-26
Payer: COMMERCIAL

## 2022-05-26 DIAGNOSIS — M80.00XS OSTEOPOROSIS WITH CURRENT PATHOLOGICAL FRACTURE, UNSPECIFIED OSTEOPOROSIS TYPE, SEQUELA: Primary | ICD-10-CM

## 2022-05-26 DIAGNOSIS — E83.42 HYPOMAGNESEMIA: ICD-10-CM

## 2022-05-26 DIAGNOSIS — D70.1 CHEMOTHERAPY INDUCED NEUTROPENIA (HCC): ICD-10-CM

## 2022-05-26 DIAGNOSIS — C54.1 ENDOMETRIAL CANCER (HCC): ICD-10-CM

## 2022-05-26 DIAGNOSIS — T45.1X5A CHEMOTHERAPY INDUCED NEUTROPENIA (HCC): ICD-10-CM

## 2022-05-26 LAB
ALBUMIN SERPL BCP-MCNC: 3.7 G/DL (ref 3.5–5)
ALP SERPL-CCNC: 92 U/L (ref 34–104)
ALT SERPL W P-5'-P-CCNC: 11 U/L (ref 7–52)
AMYLASE SERPL-CCNC: 16 IU/L (ref 29–103)
ANION GAP SERPL CALCULATED.3IONS-SCNC: 11 MMOL/L (ref 4–13)
APTT PPP: 41 SECONDS (ref 23–37)
AST SERPL W P-5'-P-CCNC: 12 U/L (ref 13–39)
BACTERIA UR QL AUTO: ABNORMAL /HPF
BASOPHILS # BLD AUTO: 0.04 THOUSANDS/ΜL (ref 0–0.1)
BASOPHILS NFR BLD AUTO: 1 % (ref 0–1)
BILIRUB SERPL-MCNC: 0.36 MG/DL (ref 0.2–1)
BILIRUB UR QL STRIP: NEGATIVE
BUN SERPL-MCNC: 21 MG/DL (ref 5–25)
CALCIUM SERPL-MCNC: 9.1 MG/DL (ref 8.4–10.2)
CHLORIDE SERPL-SCNC: 104 MMOL/L (ref 96–108)
CHOLEST SERPL-MCNC: 148 MG/DL
CLARITY UR: CLEAR
CO2 SERPL-SCNC: 20 MMOL/L (ref 21–32)
COLOR UR: ABNORMAL
CREAT SERPL-MCNC: 0.76 MG/DL (ref 0.6–1.3)
CREAT UR-MCNC: 47.2 MG/DL
EOSINOPHIL # BLD AUTO: 0.01 THOUSAND/ΜL (ref 0–0.61)
EOSINOPHIL NFR BLD AUTO: 0 % (ref 0–6)
ERYTHROCYTE [DISTWIDTH] IN BLOOD BY AUTOMATED COUNT: 13.5 % (ref 11.6–15.1)
GFR SERPL CREATININE-BSD FRML MDRD: 83 ML/MIN/1.73SQ M
GGT SERPL-CCNC: <7 U/L (ref 5–85)
GLUCOSE SERPL-MCNC: 79 MG/DL (ref 65–140)
GLUCOSE UR STRIP-MCNC: NEGATIVE MG/DL
HCT VFR BLD AUTO: 30.5 % (ref 34.8–46.1)
HGB BLD-MCNC: 9.9 G/DL (ref 11.5–15.4)
HGB UR QL STRIP.AUTO: NEGATIVE
IMM GRANULOCYTES # BLD AUTO: 0.06 THOUSAND/UL (ref 0–0.2)
IMM GRANULOCYTES NFR BLD AUTO: 1 % (ref 0–2)
INR PPP: 1.03 (ref 0.84–1.19)
KETONES UR STRIP-MCNC: NEGATIVE MG/DL
LEUKOCYTE ESTERASE UR QL STRIP: ABNORMAL
LIPASE SERPL-CCNC: 9 U/L (ref 11–82)
LYMPHOCYTES # BLD AUTO: 0.65 THOUSANDS/ΜL (ref 0.6–4.47)
LYMPHOCYTES NFR BLD AUTO: 11 % (ref 14–44)
MAGNESIUM SERPL-MCNC: 1.7 MG/DL (ref 1.9–2.7)
MCH RBC QN AUTO: 32.8 PG (ref 26.8–34.3)
MCHC RBC AUTO-ENTMCNC: 32.5 G/DL (ref 31.4–37.4)
MCV RBC AUTO: 101 FL (ref 82–98)
MONOCYTES # BLD AUTO: 0.52 THOUSAND/ΜL (ref 0.17–1.22)
MONOCYTES NFR BLD AUTO: 9 % (ref 4–12)
NEUTROPHILS # BLD AUTO: 4.72 THOUSANDS/ΜL (ref 1.85–7.62)
NEUTS SEG NFR BLD AUTO: 78 % (ref 43–75)
NITRITE UR QL STRIP: NEGATIVE
NON-SQ EPI CELLS URNS QL MICRO: ABNORMAL /HPF
NRBC BLD AUTO-RTO: 0 /100 WBCS
PH UR STRIP.AUTO: 6 [PH]
PHOSPHATE SERPL-MCNC: 3.4 MG/DL (ref 2.3–4.1)
PLATELET # BLD AUTO: 212 THOUSANDS/UL (ref 149–390)
PMV BLD AUTO: 11.1 FL (ref 8.9–12.7)
POTASSIUM SERPL-SCNC: 3.7 MMOL/L (ref 3.5–5.3)
PROT SERPL-MCNC: 7.2 G/DL (ref 6.4–8.4)
PROT UR STRIP-MCNC: ABNORMAL MG/DL
PROT UR-MCNC: 33 MG/DL
PROT/CREAT UR: 0.7 MG/G{CREAT} (ref 0–0.1)
PROTHROMBIN TIME: 13.5 SECONDS (ref 11.6–14.5)
RBC # BLD AUTO: 3.02 MILLION/UL (ref 3.81–5.12)
RBC #/AREA URNS AUTO: ABNORMAL /HPF
SODIUM SERPL-SCNC: 135 MMOL/L (ref 135–147)
SP GR UR STRIP.AUTO: 1.01 (ref 1–1.03)
T3 SERPL-MCNC: 1 NG/ML (ref 0.6–1.8)
T4 FREE SERPL-MCNC: 1.34 NG/DL (ref 0.76–1.46)
TSH SERPL DL<=0.05 MIU/L-ACNC: 1.23 UIU/ML (ref 0.45–4.5)
UROBILINOGEN UR STRIP-ACNC: <2 MG/DL
WBC # BLD AUTO: 6 THOUSAND/UL (ref 4.31–10.16)
WBC #/AREA URNS AUTO: ABNORMAL /HPF

## 2022-05-26 PROCEDURE — 82977 ASSAY OF GGT: CPT

## 2022-05-26 PROCEDURE — 82465 ASSAY BLD/SERUM CHOLESTEROL: CPT

## 2022-05-26 PROCEDURE — 83690 ASSAY OF LIPASE: CPT

## 2022-05-26 PROCEDURE — 84100 ASSAY OF PHOSPHORUS: CPT

## 2022-05-26 PROCEDURE — 82570 ASSAY OF URINE CREATININE: CPT

## 2022-05-26 PROCEDURE — 84439 ASSAY OF FREE THYROXINE: CPT

## 2022-05-26 PROCEDURE — 80053 COMPREHEN METABOLIC PANEL: CPT

## 2022-05-26 PROCEDURE — 85025 COMPLETE CBC W/AUTO DIFF WBC: CPT

## 2022-05-26 PROCEDURE — 82150 ASSAY OF AMYLASE: CPT

## 2022-05-26 PROCEDURE — 81001 URINALYSIS AUTO W/SCOPE: CPT

## 2022-05-26 PROCEDURE — 84443 ASSAY THYROID STIM HORMONE: CPT

## 2022-05-26 PROCEDURE — 83735 ASSAY OF MAGNESIUM: CPT

## 2022-05-26 PROCEDURE — 84156 ASSAY OF PROTEIN URINE: CPT

## 2022-05-26 PROCEDURE — 85730 THROMBOPLASTIN TIME PARTIAL: CPT

## 2022-05-26 PROCEDURE — 85610 PROTHROMBIN TIME: CPT

## 2022-05-26 PROCEDURE — 84480 ASSAY TRIIODOTHYRONINE (T3): CPT

## 2022-05-26 NOTE — PROGRESS NOTES
Pt resting with no complaints  Port accessed, labs drawn, port saline-locked and deaccessed without issue  Pt unable to urinate at this time; pt given a cup and copy of labwork to complete later today  Pt declined AVS  Next appt reviewed c/ pt

## 2022-05-27 ENCOUNTER — DOCUMENTATION (OUTPATIENT)
Dept: OTHER | Facility: HOSPITAL | Age: 67
End: 2022-05-27

## 2022-05-27 ENCOUNTER — OFFICE VISIT (OUTPATIENT)
Dept: GYNECOLOGIC ONCOLOGY | Facility: CLINIC | Age: 67
End: 2022-05-27
Payer: COMMERCIAL

## 2022-05-27 ENCOUNTER — HOSPITAL ENCOUNTER (OUTPATIENT)
Dept: RADIOLOGY | Facility: HOSPITAL | Age: 67
Discharge: HOME | End: 2022-05-27
Attending: OBSTETRICS & GYNECOLOGY
Payer: COMMERCIAL

## 2022-05-27 ENCOUNTER — HOSPITAL ENCOUNTER (OUTPATIENT)
Dept: INFUSION CENTER | Facility: HOSPITAL | Age: 67
Discharge: HOME | End: 2022-05-27
Payer: COMMERCIAL

## 2022-05-27 VITALS
SYSTOLIC BLOOD PRESSURE: 118 MMHG | DIASTOLIC BLOOD PRESSURE: 80 MMHG | HEIGHT: 59 IN | HEART RATE: 52 BPM | OXYGEN SATURATION: 98 % | WEIGHT: 118 LBS | TEMPERATURE: 97.6 F | RESPIRATION RATE: 17 BRPM | BODY MASS INDEX: 23.79 KG/M2

## 2022-05-27 VITALS
TEMPERATURE: 97 F | SYSTOLIC BLOOD PRESSURE: 124 MMHG | RESPIRATION RATE: 18 BRPM | BODY MASS INDEX: 24.23 KG/M2 | WEIGHT: 117.95 LBS | DIASTOLIC BLOOD PRESSURE: 70 MMHG | HEART RATE: 64 BPM

## 2022-05-27 DIAGNOSIS — N13.5 OBSTRUCTION OF RIGHT URETER: ICD-10-CM

## 2022-05-27 DIAGNOSIS — C54.1 ENDOMETRIAL CANCER (HCC): Primary | Chronic | ICD-10-CM

## 2022-05-27 PROCEDURE — 3008F BODY MASS INDEX DOCD: CPT | Performed by: PHYSICIAN ASSISTANT

## 2022-05-27 PROCEDURE — J9999Q0 INV ATEZOLIZUMAB 1200MG/20ML 20 ML: Performed by: OBSTETRICS & GYNECOLOGY

## 2022-05-27 PROCEDURE — 50431 NJX PX NFROSGRM &/URTRGRM: CPT

## 2022-05-27 PROCEDURE — 50431 NJX PX NFROSGRM &/URTRGRM: CPT | Performed by: RADIOLOGY

## 2022-05-27 PROCEDURE — 99215 OFFICE O/P EST HI 40 MIN: CPT | Performed by: PHYSICIAN ASSISTANT

## 2022-05-27 PROCEDURE — J9035Q0 INV BEVACIZUMAB 400MG/16ML 16 ML: Performed by: OBSTETRICS & GYNECOLOGY

## 2022-05-27 PROCEDURE — 96413 CHEMO IV INFUSION 1 HR: CPT

## 2022-05-27 PROCEDURE — 96417 CHEMO IV INFUS EACH ADDL SEQ: CPT

## 2022-05-27 RX ADMIN — Medication 878 MG: at 12:02

## 2022-05-27 RX ADMIN — IOHEXOL 4 ML: 350 INJECTION, SOLUTION INTRAVENOUS at 13:42

## 2022-05-27 RX ADMIN — Medication 1200 MG: at 11:19

## 2022-05-27 NOTE — BRIEF OP NOTE (RAD/CATH)
INTERVENTIONAL RADIOLOGY PROCEDURE NOTE    Date: 5/27/2022    Procedure: IR NEPHROSTOMY TUBE CHECK/CHANGE/REPOSITION/REINSERTION/UPSIZE    Preoperative diagnosis:   1  Obstruction of right ureter         Postoperative diagnosis: Same  Surgeon: Marilu East MD     Assistant: None  No qualified resident was available  Blood loss: None    Specimens: None     Findings:   1  Right PCN was in stable position  2  Right antegrade nephrostogram showed occlusion of mid ureter  3  Patient will need to return for conversion of the right PCN to PCNU next week with sedation as patient has failed capping trial     Complications: None immediate      Anesthesia: none

## 2022-05-27 NOTE — PROGRESS NOTES
Patient was seen in the office on 5/27/2022 for her Cycle 26 Day 1 pretreatment office visit for [facility] with BHAVYA Michael  Patient states that she is doing well   She still gets occasional mouth sores that she uses biotene mouth wash as well as a salt water rinse that helps  The RX for the mouth sores helped  She still has occasional constipation that is manageable  All AEs and Conmeds were reviewed   Future appointments were reviewed  Overall, patient states that she is feeling okay, and was told to call with any questions or concerns

## 2022-05-27 NOTE — ASSESSMENT & PLAN NOTE
Recurrent stage IB endometrial cancer currently on ENDOBARR clinical trial presents  She has received 25 cycles  Overall, she is tolerating treatment well  She has treatment related fatigue, which is stable  Her treatment-related mouth sores are improved       Labs from 5/26/22 reviewed  Proceed with next cycle of treatment as scheduled       CT imaging per protocol scheduled June       Return to the office per protocol

## 2022-05-27 NOTE — PROGRESS NOTES
Labs have been reviewed and signed by Dr Dominique   on 5/26/2022   Per protocol, patient is ok to proceed with Cycle 26 Day 1 treatment on [facility] 5/27/22

## 2022-05-27 NOTE — PROGRESS NOTES
Patient was seen in the infusion center for cycle 26  Patient was given Cycle 26 Rucaparib pills and drug diary, and patient returned Cycle 25 study pills, and diary  Patient denied any changes to conmeds since office visit prior to infusion    Patient was told to call with any questions or concerns, Patient tolerates treatment without incident

## 2022-05-27 NOTE — PROGRESS NOTES
Assessment/Plan:    Problem List Items Addressed This Visit        Genitourinary    Endometrial cancer ([facility] [address] ) - Primary (Chronic)     Recurrent stage IB endometrial cancer currently on ENDOBARR clinical trial presents  She has received 25 cycles  Overall, she is tolerating treatment well  She has treatment related fatigue, which is stable  Her treatment-related mouth sores are improved       Labs from 5/26/22 reviewed  Proceed with next cycle of treatment as scheduled       CT imaging per protocol scheduled June       Return to the office per protocol  Obstruction of right ureter     Continue with urology  Recently failed PCN capping  Ongoing f/u with IR                    CHIEF COMPLAINT:   Follow-up on clinical trial, pre-cycle 26    Problem:  Cancer Staging  Endomet[facility] ([facility] )  Staging form: Corpus Uteri - Carcinoma, AJCC 7th Edition  - Clinical stage from 12/19/2017: FIGO Stage IB (T1b, N0, M0) - Signed by Pedro Dakins, MD on 2/12/2018        Previous therapy:  Oncology History   Endometrial cancer ([facility] [address] )   11/17/2017 Initial Diagnosis    Endometrial cancer ([facility] )     11/17/2017 Biopsy    ENDOMETRIAL BIOPSY: WELL DIFFERENTIATED ENDOMETRIAL ADENOCARCINOMA (FIGO I) WITH FOCALMUCINOUS FEATURES  Part B: ENDOCERVICAL POLYP:BENIGN ENDOCERVICAL      12/19/2017 Surgery    Robotic assisted total laparoscopic hysterectomy with bilateral salpingo-oophorectomy and sentinel bilateral pelvic lymph node dissection  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 (4 4 x 3 2 cm tumor, 9 4/15 4mm invasion, NO LVSI, washings revealed atypical cellular changes)     12/19/2017 Genetic Testing    Morrison testing negative     6/28/2019 Biopsy    A  Breast, Right, US BX Right Breast 1000 4 cmfn:  - Benign breast tissue with focal histiocytic aggregate  See comment   - Negative for atypia and in-situ or invasive carcinoma       7/8/2019 Recurrence    Presented with right lower extremity DVT and CT demonstrating right pelvic sidewall mass with venous, ureteral and nerve compression causing significant neuropathic pain  Biopsy:  Lymph Node, Right pelvic lymph node x3:  - High-grade adenocarcinoma  7/30/2019 - 1/6/2020 Chemotherapy    Taxol 175 mg/m2 and carboplatin AUC 6 every 21 days  Dose was reduced to taxol 135 mg/m2 and carboplatin AUC 5  Completed 6 cycles  Treatment protracted due to multiple hospital admissions  2/24/2020 - 4/13/2020 Radiation    adjuvant external beam radiation therapy to the whole pelvis to 4500 cGy followed by boost to gross disease of an additional 2200 cGy     11/23/2020 Progression    New necrotic adenopathy in the retroperitoneum on CT      12/21/2020 -  Chemotherapy    Began chemotherapy with rucaparib, atezolizumab, and bevacizumab as per [facility] clinical trial            Patient ID: Eloy Lau is a 61 y o  female  who presents to the office for pre-treatment evaluation on [facility] clinical trial  The patient has been afebrile  Overall, she is feeling well  Her treatment-related fatigue is stable  She denies vomiting  Appetite is appropriate  She notes mouth sore that have improved  She uses magic mouthwash at home  She denies diarrhea, blood in her stool, new or worsening SOB/cough or skin rash  Her blood pressures are well controlled  The patient recently underwent trial of PCN capping, which resulted in pelvic/abdominal pressure several hours after  This was a failed attempt  She has f/u with IR  Labs from 5/26/22 reviewed  The following portions of the patient's history were reviewed and updated as appropriate: allergies, current medications, past medical history, past surgical history and problem list     Review of Systems   Constitutional: Positive for fatigue  Negative for fever  HENT: Positive for mouth sores  Eyes: Negative  Respiratory: Negative  Cardiovascular: Negative  Gastrointestinal: Negative  Genitourinary: Negative  Musculoskeletal: Negative  Skin: Negative  Neurological: Negative  Psychiatric/Behavioral: Negative  Current Outpatient Medications   Medication Sig Dispense Refill    acetaminophen-codeine (TYLENOL #3) 300-30 mg per tablet Take 1 tablet by mouth every 6 (six) hours as needed for moderate pain 20 tablet 0    ARIPiprazole (ABILIFY) 20 MG tablet Take 20 mg by mouth in the morning   aspirin (ECOTRIN LOW STRENGTH) 81 mg EC tablet Take 81 mg by mouth daily      Calcium-Magnesium-Vitamin D (CALCIUM 500 PO) Take by mouth daily       cholecalciferol (VITAMIN D3) 1,000 units tablet Take 1,000 Units by mouth daily      Cyanocobalamin (VITAMIN B12 PO) Take by mouth daily       dronabinol (MARINOL) 2 5 mg capsule Take 1 capsule (2 5 mg total) by mouth 2 (two) times a day before meals (Patient taking differently: Take 2 5 mg by mouth 2 (two) times a day before meals As needed) 60 capsule 0    enoxaparin (LOVENOX) 80 mg/0 8 mL Inject 0 8 mL (80 mg total) under the skin every 24 hours 30 mL 5    folic acid (FOLVITE) 1 mg tablet Take 1 tablet (1 mg total) by mouth daily  0    gemfibrozil (LOPID) 600 mg tablet TAKE 1 TABLET BY MOUTH EVERY DAY 90 tablet 0    lactulose (CHRONULAC) 10 g/15 mL solution TAKE 45 ML (30 G TOTAL) BY MOUTH 2 (TWO) TIMES A  mL 2    LORazepam (ATIVAN) 0 5 mg tablet Take 1 tablet (0 5 mg total) by mouth every 6 (six) hours as needed for anxiety (or nausea) 36 tablet 1    Multiple Vitamin (MULTIVITAMIN) tablet Take 1 tablet by mouth daily      naloxone (NARCAN) 4 mg/0 1 mL nasal spray Administer 1 spray into a nostril  If no response after 2-3 minutes, give another dose in the other nostril using a new spray   1 each 0    omeprazole (PriLOSEC) 20 mg delayed release capsule Take 20 mg by mouth daily      ondansetron (ZOFRAN) 8 mg tablet Take 1 tablet (8 mg total) by mouth every 8 (eight) hours as needed for nausea or vomiting 30 tablet 1    oxybutynin (DITROPAN XL) 15 MG 24 hr tablet TAKE 1 TABLET BY MOUTH DAILY AT BEDTIME 90 tablet 3    oxyCODONE-acetaminophen (Percocet) 5-325 mg per tablet Take 1 tablet by mouth every 4 (four) hours as needed for moderate pain Max Daily Amount: 6 tablets 30 tablet 0    phenazopyridine (PYRIDIUM) 200 mg tablet Take 1 tablet (200 mg total) by mouth daily as needed for bladder spasms 30 tablet 0    rucaparib (RUBRACA) 300 mg tablet Take 600 mg by mouth every 12 (twelve) hours Take with or without food  Do not repeat a vomited dose   saccharomyces boulardii (FLORASTOR) 250 mg capsule Take 1 capsule (250 mg total) by mouth 2 (two) times a day  0    venlafaxine (EFFEXOR) 75 mg tablet Take 75 mg by mouth 3 (three) times a day        ARIPiprazole (ABILIFY) 10 mg tablet Take 20 mg by mouth daily   (Patient not taking: No sig reported)      clotrimazole-betamethasone (LOTRISONE) 1-0 05 % cream Apply topically 2 (two) times a day (Patient not taking: No sig reported) 30 g 0    Fiber Adult Gummies 2 g CHEW Chew 1 tablet in the morning (Patient not taking: No sig reported) 90 tablet 1     No current facility-administered medications for this visit  Facility-Administered Medications Ordered in Other Visits   Medication Dose Route Frequency Provider Last Rate Last Admin    INV atezolizumab (INVESTIGATIONAL) 1,200 mg in sodium chloride 0 9 % 250 mL infusion  1,200 mg Intravenous Once Javan Veronica MD        INV bevacizumab (INVESTIGATIONAL) 878 mg in sodium chloride 0 9 % 64 88 mL IVPB  878 mg Intravenous Once Javan Veronica MD               Objective:    Blood pressure 118/80, pulse (!) 52, temperature 97 6 °F (36 4 °C), resp  rate 17, height 4' 10 5" (1 486 m), weight 53 5 kg (118 lb), SpO2 98 %, not currently breastfeeding  Body mass index is 24 24 kg/m²  Body surface area is 1 46 meters squared  Physical Exam  Vitals reviewed  Constitutional:       General: She is not in acute distress       Appearance: Normal appearance  She is not ill-appearing  HENT:      Head: Normocephalic and atraumatic  Mouth/Throat:      Mouth: Mucous membranes are moist    Eyes:      General: No scleral icterus  Right eye: No discharge  Left eye: No discharge  Conjunctiva/sclera: Conjunctivae normal    Pulmonary:      Effort: Pulmonary effort is normal    Musculoskeletal:      Right lower leg: No edema  Left lower leg: No edema  Skin:     General: Skin is warm and dry  Coloration: Skin is not jaundiced  Findings: No rash  Neurological:      General: No focal deficit present  Mental Status: She is alert and oriented to person, place, and time  Cranial Nerves: No cranial nerve deficit  Sensory: No sensory deficit  Motor: No weakness  Gait: Gait normal    Psychiatric:         Mood and Affect: Mood normal          Behavior: Behavior normal          Thought Content: Thought content normal          Judgment: Judgment normal      Performance status is zero         Lab Results   Component Value Date     10/27/2017    K 3 7 05/26/2022     05/26/2022    CO2 20 (L) 05/26/2022    BUN 21 05/26/2022    CREATININE 0 76 05/26/2022    GLUCOSE 219 (H) 12/19/2017    GLUF 95 05/06/2022    CALCIUM 9 1 05/26/2022    CORRECTEDCA 10 0 04/15/2022    AST 12 (L) 05/26/2022    ALT 11 05/26/2022    ALKPHOS 92 05/26/2022    PROT 6 9 10/27/2017    BILITOT 0 3 10/27/2017    EGFR 83 05/26/2022     Lab Results   Component Value Date    WBC 6 00 05/26/2022    HGB 9 9 (L) 05/26/2022    HCT 30 5 (L) 05/26/2022     (H) 05/26/2022     05/26/2022     Lab Results   Component Value Date    NEUTROABS 4 72 05/26/2022

## 2022-05-27 NOTE — SEDATION DOCUMENTATION
Right PCN tube check completed in IR by Dr Alondra Bertrand d/t c/o leaking at site  Patient to be scheduled for PCN to PCNU conversion with sedation next week if possible  Provided IR scheduling number and instructed to call on Wed if she has not been contacted yet  AVS declined  Discharged via [facility] without questions or complaints

## 2022-05-27 NOTE — H&P
Interventional Radiology  History and Physical 5/27/2022     Ragini Melendez   1955   [id number]    Assessment/Plan:  81-year-old female with history of endometrial cancer complicated by hydronephrosis status post right PCNU placement which was recently converted to PCN, returns due to leakage around PCN catheter  Problem List Items Addressed This Visit        Genitourinary    Obstruction of right ureter    Relevant Orders    IR nephrostomy tube check/change/reposition/reinsertion/upsize             Subjective:     Patient ID: Cass Dhillon is a 61 y o  female  History of Present Illness  Patient with history of endometrial cancer complicated by hydronephrosis status post right PCNU placement which was recently converted to PCN, returns due to leakage around PCN catheter      Review of Systems      Past Medical History:   Diagnosis Date    Anemia     Chemotherapy follow-up examination     Depression     Endometrial cancer ([facility] [address] ) 12/2017    Hyperglycemia     vx type 2 dm -- last assessed 4/1/14; resolved 11/7/17    Hyperlipidemia     Hypertension     Obesity     last assessed 10/14/17; resolved 11/7/17        Past Surgical History:   Procedure Laterality Date    ABDOMINAL SURGERY      GASTRIC BYPASS    CHOLECYSTECTOMY      at the time of gastric bypass    COLONOSCOPY      CT NEEDLE BIOPSY LYMPH NODE  7/8/2019    FL GUIDED CENTRAL VENOUS ACCESS DEVICE INSERTION  11/12/2019    GASTRIC BYPASS      HYSTERECTOMY Bilateral     total abdominal with salpingo-oophorectomy    IR NEPHROSTOMY TUBE PLACEMENT  7/26/2019    IR NEPHROURETERAL STENT CHECK/CHANGE/REPOSITION  4/6/2021    IR NEPHROURETERAL STENT CHECK/CHANGE/REPOSITION  6/4/2021    IR NEPHROURETERAL STENT CHECK/CHANGE/REPOSITION  9/3/2021    IR NEPHROURETERAL STENT CHECK/CHANGE/REPOSITION  12/7/2021    IR NEPHROURETERAL STENT CHECK/CHANGE/REPOSITION  3/8/2022    IR NEPHROURETERAL STENT CHECK/CHANGE/REPOSITION  5/10/2022    IR PICC LINE 9/27/2019    IR PORT PLACEMENT  7/26/2019    IR PORT REMOVAL  9/20/2019    OOPHORECTOMY Bilateral     NM INSJ TUNNELED CTR VAD W/SUBQ PORT AGE 5 YR/> Left 11/12/2019    Procedure: INSERTION VENOUS PORT ( PORT-A-CATH) IR;  Surgeon: Suzanne Allred DO;  [facility];  Service: Interventional Radiology    NM LAP, RADICAL HYST W/ TUBE&OV, NODE BX N/A 12/19/2017    Procedure: HYSTERECTOMY LAPAROSCOPIC TOTAL ([address]) W/ ROBOTICS; BILATERAL SALPINGOOPHERECTOMY; LYMPH NODE DISSECTION; lysis of adhesions;  Surgeon: Davie Wright MD;  [facility]: [facility];  Service: Gynecology Oncology    NM LAP,DIAGNOSTIC ABDOMEN N/A 12/19/2017    Procedure: LAPAROSCOPY DIAGNOSTIC;  Surgeon: Davie Wright MD;  [facility];  Service: Gynecology Oncology    TONSILLECTOMY      US GUIDED BREAST BIOPSY RIGHT COMPLETE Right 6/28/2019        Social History     Tobacco Use   Smoking Status Never Smoker   Smokeless Tobacco Never Used        Social History     Substance and Sexual Activity   Alcohol Use Not Currently        Social History     Substance and Sexual Activity   Drug Use No        Allergies   Allergen Reactions    Cephalosporins Rash     Which Cephalosporin reaction was to not specified; however, has tolerated Amoxicillin, Cefazolin, and Cefepime       Current Outpatient Medications   Medication Sig Dispense Refill    acetaminophen-codeine (TYLENOL #3) 300-30 mg per tablet Take 1 tablet by mouth every 6 (six) hours as needed for moderate pain 20 tablet 0    ARIPiprazole (ABILIFY) 10 mg tablet Take 20 mg by mouth daily   (Patient not taking: No sig reported)      ARIPiprazole (ABILIFY) 20 MG tablet Take 20 mg by mouth in the morning        aspirin (ECOTRIN LOW STRENGTH) 81 mg EC tablet Take 81 mg by mouth daily      Calcium-Magnesium-Vitamin D (CALCIUM 500 PO) Take by mouth daily       cholecalciferol (VITAMIN D3) 1,000 units tablet Take 1,000 Units by mouth daily      clotrimazole-betamethasone (LOTRISONE) 1-0 05 % cream Apply topically 2 (two) times a day (Patient not taking: No sig reported) 30 g 0    Cyanocobalamin (VITAMIN B12 PO) Take by mouth daily       dronabinol (MARINOL) 2 5 mg capsule Take 1 capsule (2 5 mg total) by mouth 2 (two) times a day before meals (Patient taking differently: Take 2 5 mg by mouth 2 (two) times a day before meals As needed) 60 capsule 0    enoxaparin (LOVENOX) 80 mg/0 8 mL Inject 0 8 mL (80 mg total) under the skin every 24 hours 30 mL 5    Fiber Adult Gummies 2 g CHEW Chew 1 tablet in the morning (Patient not taking: No sig reported) 90 tablet 1    folic acid (FOLVITE) 1 mg tablet Take 1 tablet (1 mg total) by mouth daily  0    gemfibrozil (LOPID) 600 mg tablet TAKE 1 TABLET BY MOUTH EVERY DAY 90 tablet 0    lactulose (CHRONULAC) 10 g/15 mL solution TAKE 45 ML (30 G TOTAL) BY MOUTH 2 (TWO) TIMES A  mL 2    LORazepam (ATIVAN) 0 5 mg tablet Take 1 tablet (0 5 mg total) by mouth every 6 (six) hours as needed for anxiety (or nausea) 36 tablet 1    Multiple Vitamin (MULTIVITAMIN) tablet Take 1 tablet by mouth daily      naloxone (NARCAN) 4 mg/0 1 mL nasal spray Administer 1 spray into a nostril  If no response after 2-3 minutes, give another dose in the other nostril using a new spray   1 each 0    omeprazole (PriLOSEC) 20 mg delayed release capsule Take 20 mg by mouth daily      ondansetron (ZOFRAN) 8 mg tablet Take 1 tablet (8 mg total) by mouth every 8 (eight) hours as needed for nausea or vomiting 30 tablet 1    oxybutynin (DITROPAN XL) 15 MG 24 hr tablet TAKE 1 TABLET BY MOUTH DAILY AT BEDTIME 90 tablet 3    oxyCODONE-acetaminophen (Percocet) 5-325 mg per tablet Take 1 tablet by mouth every 4 (four) hours as needed for moderate pain Max Daily Amount: 6 tablets 30 tablet 0    phenazopyridine (PYRIDIUM) 200 mg tablet Take 1 tablet (200 mg total) by mouth daily as needed for bladder spasms 30 tablet 0    rucaparib (RUBRACA) 300 mg tablet Take 600 mg by mouth every 12 (twelve) hours Take with or without food  Do not repeat a vomited dose   saccharomyces boulardii (FLORASTOR) 250 mg capsule Take 1 capsule (250 mg total) by mouth 2 (two) times a day  0    venlafaxine (EFFEXOR) 75 mg tablet Take 75 mg by mouth 3 (three) times a day         No current facility-administered medications for this encounter  Objective: There were no vitals filed for this visit  Physical Exam  Constitutional:       Appearance: Normal appearance  Pulmonary:      Effort: Pulmonary effort is normal    Genitourinary:     Comments: R PCN in place  Skin:     General: Skin is dry  No results found for: BNP   Lab Results   Component Value Date    WBC 6 00 05/26/2022    HGB 9 9 (L) 05/26/2022    HCT 30 5 (L) 05/26/2022     (H) 05/26/2022     05/26/2022     Lab Results   Component Value Date    INR 1 03 05/26/2022    INR 1 06 05/06/2022    INR 1 01 04/15/2022    PROTIME 13 5 05/26/2022    PROTIME 13 8 05/06/2022    PROTIME 13 3 04/15/2022     Lab Results   Component Value Date    PTT 41 (H) 05/26/2022         I have personally reviewed pertinent imaging and laboratory results  Code Status: Prior  Advance Directive and Living Will:      Power of :    POLST:      This text is generated with voice recognition software  There may be translation, syntax,  or grammatical errors  If you have any questions, please contact the dictating provider

## 2022-05-31 ENCOUNTER — OFFICE VISIT (OUTPATIENT)
Dept: PALLIATIVE MEDICINE | Facility: CLINIC | Age: 67
End: 2022-05-31
Payer: COMMERCIAL

## 2022-05-31 VITALS
HEART RATE: 70 BPM | TEMPERATURE: 97.3 F | SYSTOLIC BLOOD PRESSURE: 110 MMHG | DIASTOLIC BLOOD PRESSURE: 60 MMHG | OXYGEN SATURATION: 98 % | BODY MASS INDEX: 24.41 KG/M2 | WEIGHT: 118.83 LBS

## 2022-05-31 DIAGNOSIS — C54.1 ENDOMETRIAL CANCER (HCC): Chronic | ICD-10-CM

## 2022-05-31 DIAGNOSIS — G89.3 CANCER RELATED PAIN: ICD-10-CM

## 2022-05-31 DIAGNOSIS — R64 CACHEXIA (HCC): ICD-10-CM

## 2022-05-31 DIAGNOSIS — K59.03 DRUG INDUCED CONSTIPATION: Primary | ICD-10-CM

## 2022-05-31 PROCEDURE — 99214 OFFICE O/P EST MOD 30 MIN: CPT | Performed by: NURSE PRACTITIONER

## 2022-05-31 RX ORDER — SENNA PLUS 8.6 MG/1
1 TABLET ORAL 2 TIMES DAILY PRN
Qty: 60 TABLET | Refills: 0 | Status: SHIPPED | OUTPATIENT
Start: 2022-05-31

## 2022-05-31 RX ORDER — LACTULOSE 10 G/15ML
SOLUTION ORAL
Qty: 300 ML | Refills: 2 | Status: SHIPPED | OUTPATIENT
Start: 2022-05-31

## 2022-05-31 NOTE — PROGRESS NOTES
Outpatient Follow-Up - Palliative and Supportive Care   Ragini Melendez 61 y o  female [id number]    Assessment & Plan  Problem List Items Addressed This Visit        Digestive    Drug induced constipation - Primary    Relevant Medications    lactulose (CHRONULAC) 10 g/15 mL solution    senna (SENOKOT) 8 6 MG tablet       Genitourinary    Endometrial cancer (HCC) (Chronic)       Other    Cancer related pain    Cachexia (HCC)          Medications adjusted this encounter:  Requested Prescriptions     Signed Prescriptions Disp Refills    lactulose (CHRONULAC) 10 g/15 mL solution 300 mL 2     Sig: TAKE 45 ML (30 G TOTAL) BY MOUTH 2 (TWO) TIMES A DAY as needed    senna (SENOKOT) 8 6 MG tablet 60 tablet 0     Sig: Take 1 tablet (8 6 mg total) by mouth 2 (two) times a day as needed for constipation     No orders of the defined types were placed in this encounter  Medications Discontinued During This Encounter   Medication Reason    lactulose (CHRONULAC) 10 g/15 mL solution          Ragini Melendez was seen today for symptoms and planning cares related to above illnesses  I have reviewed the patient's controlled substance dispensing history in the Prescription Drug Monitoring Program in compliance with the [facility] regulations before prescribing any controlled substances  They are invited to continue to follow with us  If there are questions or concerns, please contact us through our clinic/answering service 24 hours a day, seven days a week  April RAFITA Hubbard  [facility] Palliative and Supportive Care        Visit Information    Accompanied By: No one    Source of History: Self    History Limitations: None    History of Present Illness      Yumi Melendez is a 61 y o  female who presents in follow up of symptoms related to endometrial cancer  Pertinent issues include: symptom management, nausea, fatigue, support in serious illness  She has been following with PCM since 2019    Shew as diagnosed in 2017 with stage IB disease, recurrence in 2019 and disease progression in 2020  She is currently on treatment with rucaparib, atezolizumab, and bevacizumab as per [facility] clinical trial   She has R PCN secondary to hydronephrosis r/t endometrial cancer  Marinol was initiated for appetite stimulation in January  At her visit in March, she had reported improved appetite  Today, she reports a loss of appetite over the past week with "a couple pound" weight loss  She denies any nausea or vomiting  She states she stopped taking the marinol "a while ago" because her appetite had improved  Will restart marinol in order to assist with appetite once again  She denies current pain  She states she is sleeping well at night and overall feels that she is doing well  Past medical, surgical, social, and family histories are reviewed and pertinent updates are made  Review of Systems   Constitutional: Positive for appetite change and unexpected weight change  All other systems reviewed and are negative  Vital Signs    /60 (Cuff Size: Standard)   Pulse 70   Temp (!) 97 3 °F (36 3 °C) (Temporal)   Wt 53 9 kg (118 lb 13 3 oz)   LMP  (LMP Unknown)   SpO2 98%   BMI 24 41 kg/m²     Physical Exam and Objective Data  Physical Exam  Vitals and nursing note reviewed  Constitutional:       General: She is not in acute distress  Appearance: Normal appearance  She is not ill-appearing  HENT:      Head: Normocephalic and atraumatic  Jaw: There is normal jaw occlusion  Mouth/Throat:      Lips: Pink  Pharynx: Oropharynx is clear  Eyes:      General: Lids are normal       Extraocular Movements: Extraocular movements intact  Neck:      Trachea: Trachea normal    Cardiovascular:      Rate and Rhythm: Normal rate and regular rhythm  Pulmonary:      Effort: Pulmonary effort is normal  No prolonged expiration, respiratory distress or retractions     Abdominal:      General: Abdomen is flat  Palpations: Abdomen is soft  Musculoskeletal:      Cervical back: Full passive range of motion without pain  Comments: Equal age appropriate extremity strenght   Skin:     General: Skin is warm and dry  Neurological:      General: No focal deficit present  Mental Status: She is alert and oriented to person, place, and time  GCS: GCS eye subscore is 4  GCS verbal subscore is 5  GCS motor subscore is 6  Psychiatric:         Attention and Perception: Attention and perception normal          Mood and Affect: Mood and affect normal          Behavior: Behavior is cooperative  Cognition and Memory: Cognition and memory normal          30 +  minutes was spent face to face with Ragini Melendez with greater than 50% of the time spent in counseling or coordination of care including discussions of etiology of diagnosis, diagnostic results, impression, and recommendations, instructions for disease self management, treatment instructions and follow up requirements  All of the patient's or agent's questions were answered during this discussion

## 2022-05-31 NOTE — H&P (VIEW-ONLY)
Outpatient Follow-Up - Palliative and Supportive Care   Ragini Melendez 61 y o  female [id number]    Assessment & Plan  Problem List Items Addressed This Visit        Digestive    Drug induced constipation - Primary    Relevant Medications    lactulose (CHRONULAC) 10 g/15 mL solution    senna (SENOKOT) 8 6 MG tablet       Genitourinary    Endometrial cancer (HCC) (Chronic)       Other    Cancer related pain    Cachexia (HCC)          Medications adjusted this encounter:  Requested Prescriptions     Signed Prescriptions Disp Refills    lactulose (CHRONULAC) 10 g/15 mL solution 300 mL 2     Sig: TAKE 45 ML (30 G TOTAL) BY MOUTH 2 (TWO) TIMES A DAY as needed    senna (SENOKOT) 8 6 MG tablet 60 tablet 0     Sig: Take 1 tablet (8 6 mg total) by mouth 2 (two) times a day as needed for constipation     No orders of the defined types were placed in this encounter  Medications Discontinued During This Encounter   Medication Reason    lactulose (CHRONULAC) 10 g/15 mL solution          Ragini Melendez was seen today for symptoms and planning cares related to above illnesses  I have reviewed the patient's controlled substance dispensing history in the Prescription Drug Monitoring Program in compliance with the [facility] regulations before prescribing any controlled substances  They are invited to continue to follow with us  If there are questions or concerns, please contact us through our clinic/answering service 24 hours a day, seven days a week  April RAFITA Hubbard  [facility] Palliative and Supportive Care        Visit Information    Accompanied By: No one    Source of History: Self    History Limitations: None    History of Present Illness      Shazia Melendez is a 61 y o  female who presents in follow up of symptoms related to endometrial cancer  Pertinent issues include: symptom management, nausea, fatigue, support in serious illness  She has been following with PCM since 2019    Shew as diagnosed in 2017 with stage IB disease, recurrence in 2019 and disease progression in 2020  She is currently on treatment with rucaparib, atezolizumab, and bevacizumab as per [facility] clinical trial   She has R PCN secondary to hydronephrosis r/t endometrial cancer  Marinol was initiated for appetite stimulation in January  At her visit in March, she had reported improved appetite  Today, she reports a loss of appetite over the past week with "a couple pound" weight loss  She denies any nausea or vomiting  She states she stopped taking the marinol "a while ago" because her appetite had improved  Will restart marinol in order to assist with appetite once again  She denies current pain  She states she is sleeping well at night and overall feels that she is doing well  Past medical, surgical, social, and family histories are reviewed and pertinent updates are made  Review of Systems   Constitutional: Positive for appetite change and unexpected weight change  All other systems reviewed and are negative  Vital Signs    /60 (Cuff Size: Standard)   Pulse 70   Temp (!) 97 3 °F (36 3 °C) (Temporal)   Wt 53 9 kg (118 lb 13 3 oz)   LMP  (LMP Unknown)   SpO2 98%   BMI 24 41 kg/m²     Physical Exam and Objective Data  Physical Exam  Vitals and nursing note reviewed  Constitutional:       General: She is not in acute distress  Appearance: Normal appearance  She is not ill-appearing  HENT:      Head: Normocephalic and atraumatic  Jaw: There is normal jaw occlusion  Mouth/Throat:      Lips: Pink  Pharynx: Oropharynx is clear  Eyes:      General: Lids are normal       Extraocular Movements: Extraocular movements intact  Neck:      Trachea: Trachea normal    Cardiovascular:      Rate and Rhythm: Normal rate and regular rhythm  Pulmonary:      Effort: Pulmonary effort is normal  No prolonged expiration, respiratory distress or retractions     Abdominal:      General: Abdomen is flat  Palpations: Abdomen is soft  Musculoskeletal:      Cervical back: Full passive range of motion without pain  Comments: Equal age appropriate extremity strenght   Skin:     General: Skin is warm and dry  Neurological:      General: No focal deficit present  Mental Status: She is alert and oriented to person, place, and time  GCS: GCS eye subscore is 4  GCS verbal subscore is 5  GCS motor subscore is 6  Psychiatric:         Attention and Perception: Attention and perception normal          Mood and Affect: Mood and affect normal          Behavior: Behavior is cooperative  Cognition and Memory: Cognition and memory normal          30 +  minutes was spent face to face with aRgini Melendez with greater than 50% of the time spent in counseling or coordination of care including discussions of etiology of diagnosis, diagnostic results, impression, and recommendations, instructions for disease self management, treatment instructions and follow up requirements  All of the patient's or agent's questions were answered during this discussion

## 2022-06-01 ENCOUNTER — TELEPHONE (OUTPATIENT)
Dept: GYNECOLOGIC ONCOLOGY | Facility: CLINIC | Age: 67
End: 2022-06-01

## 2022-06-01 NOTE — TELEPHONE ENCOUNTER
Per Viri Kate, patient will be able to come in to [facility] to see [facility]  Her appointment with infusion and this office visit were rescheduled to the morning of 6/17 at [place]

## 2022-06-01 NOTE — TELEPHONE ENCOUNTER
Phone call to patient to reschedule appointment on 6/17 with Dr Joan Wade for the same day with Michelle Elkins at [place] due to provider being unavailable at that time  I spoke with Ricardo Johnson per the patients request and Nayeli states if we could have her see Michelle Elkins at [place] that would be perfect, and she can work on moving her appointment with infusion to [place] as well  Left message to return my call  Spouse/Significant other

## 2022-06-03 ENCOUNTER — TELEPHONE (OUTPATIENT)
Dept: RADIOLOGY | Facility: HOSPITAL | Age: 67
End: 2022-06-03

## 2022-06-03 ENCOUNTER — NEW PATIENT COMPREHENSIVE (OUTPATIENT)
Dept: URBAN - METROPOLITAN AREA CLINIC 6 | Facility: CLINIC | Age: 67
End: 2022-06-03

## 2022-06-03 DIAGNOSIS — H25.813: ICD-10-CM

## 2022-06-03 PROCEDURE — 92015 DETERMINE REFRACTIVE STATE: CPT

## 2022-06-03 PROCEDURE — 92004 COMPRE OPH EXAM NEW PT 1/>: CPT

## 2022-06-03 RX ORDER — SODIUM CHLORIDE 9 MG/ML
75 INJECTION, SOLUTION INTRAVENOUS CONTINUOUS
Status: CANCELLED | OUTPATIENT
Start: 2022-06-03

## 2022-06-03 ASSESSMENT — TONOMETRY
OS_IOP_MMHG: 9
OD_IOP_MMHG: 10

## 2022-06-03 ASSESSMENT — VISUAL ACUITY
OS_GLARE: 20/60
OD_PH: 20/50
OD_CC: 20/60
OD_GLARE: 20/60
OU_CC: J3
OS_PH: 20/40
OS_CC: 20/60

## 2022-06-06 ENCOUNTER — TELEPHONE (OUTPATIENT)
Dept: RADIOLOGY | Facility: HOSPITAL | Age: 67
End: 2022-06-06

## 2022-06-07 ENCOUNTER — HOSPITAL ENCOUNTER (OUTPATIENT)
Dept: NON INVASIVE DIAGNOSTICS | Facility: CLINIC | Age: 67
Discharge: HOME | End: 2022-06-07
Payer: COMMERCIAL

## 2022-06-07 ENCOUNTER — TELEPHONE (OUTPATIENT)
Dept: RADIOLOGY | Facility: HOSPITAL | Age: 67
End: 2022-06-07

## 2022-06-07 VITALS
BODY MASS INDEX: 23.79 KG/M2 | HEART RATE: 80 BPM | DIASTOLIC BLOOD PRESSURE: 60 MMHG | HEIGHT: 59 IN | WEIGHT: 118 LBS | SYSTOLIC BLOOD PRESSURE: 110 MMHG

## 2022-06-07 DIAGNOSIS — C54.1 ENDOMETRIAL CANCER (HCC): ICD-10-CM

## 2022-06-07 LAB
AORTIC ROOT: 2.8 CM
APICAL FOUR CHAMBER EJECTION FRACTION: 62 %
FRACTIONAL SHORTENING: 31 (ref 28–44)
GLOBAL LONGITUIDAL STRAIN: -20 %
INTERVENTRICULAR SEPTUM IN DIASTOLE (PARASTERNAL SHORT AXIS VIEW): 1.2 CM
INTERVENTRICULAR SEPTUM: 1.2 CM (ref 0.6–1.1)
LEFT ATRIUM SIZE: 4.4 CM
LEFT INTERNAL DIMENSION IN SYSTOLE: 2.2 CM (ref 2.1–4)
LEFT VENTRICULAR INTERNAL DIMENSION IN DIASTOLE: 3.2 CM (ref 3.5–6)
LEFT VENTRICULAR POSTERIOR WALL IN END DIASTOLE: 1.2 CM
LEFT VENTRICULAR STROKE VOLUME: 23 ML
LVSV (TEICH): 23 ML
PA SYSTOLIC PRESSURE: 26 MMHG
SL CV LV EF: 60
SL CV PED ECHO LEFT VENTRICLE DIASTOLIC VOLUME (MOD BIPLANE) 2D: 40 ML
SL CV PED ECHO LEFT VENTRICLE SYSTOLIC VOLUME (MOD BIPLANE) 2D: 17 ML
TR MAX PG: 22 MMHG
TR PEAK VELOCITY: 2.4 M/S
TRICUSPID VALVE PEAK REGURGITATION VELOCITY: 2.37 M/S

## 2022-06-07 PROCEDURE — 93321 DOPPLER ECHO F-UP/LMTD STD: CPT

## 2022-06-07 PROCEDURE — 93325 DOPPLER ECHO COLOR FLOW MAPG: CPT

## 2022-06-07 PROCEDURE — 93321 DOPPLER ECHO F-UP/LMTD STD: CPT | Performed by: INTERNAL MEDICINE

## 2022-06-07 PROCEDURE — 93325 DOPPLER ECHO COLOR FLOW MAPG: CPT | Performed by: INTERNAL MEDICINE

## 2022-06-07 PROCEDURE — 93308 TTE F-UP OR LMTD: CPT

## 2022-06-07 PROCEDURE — 93308 TTE F-UP OR LMTD: CPT | Performed by: INTERNAL MEDICINE

## 2022-06-07 RX ORDER — LEVOFLOXACIN 5 MG/ML
500 INJECTION, SOLUTION INTRAVENOUS ONCE
Status: CANCELLED | OUTPATIENT
Start: 2022-06-07 | End: 2022-06-07

## 2022-06-07 NOTE — PRE-PROCEDURE INSTRUCTIONS
Pre-procedure Instructions for Interventional Radiology  [address] [place] [address] [address] [phone]    You are scheduled for a/an Conversion of Bilateral PCNs to PCNUs  On Thursday 6/9/22  Your tentative arrival time is AM   Short stay will notify you the day before your procedure with the exact arrival time and the location to arrive  To prepare for your procedure:  1  Please arrange for someone to drive you home after the procedure and stay with you until the next morning if you are instructed to do so  This is typically for patients receiving some type of sedative or anesthetic for the procedure  2  DO NOT EAT OR DRINK ANYTHING after midnight on the evening before your procedure including candy & gum   3  ONLY SIPS OF WATER with your medications are allowed on the morning of your procedure  4  TAKE ALL OF YOUR REGULAR MEDICATIONS THE MORNING OF YOUR PROCEDURE with sips of water! We may call you to stop some of your blood sugar, blood pressure and blood thinning medications depending on the procedure  Please take all of these medications unless we instruct you to stop them  5  If you have an allergy to x-ray dye, please contact Interventional Radiology for an x-ray dye preparation which usually consists of an oral steroid and Benadryl  The day of your procedure:  1  Bring a list of the medications you take at home  2  Bring medications you take for breathing problems (such as inhalers), medications for chest pain, or both  3  Bring a case for your glasses or contacts  4  Bring your insurance card and a form of photo ID   5  Please leave all valuables such as credit cards and jewelry at home  6  Report to the registration desk in [facility] main lobby [facility]  Ask to be directed to [facility]    7  While your procedure is being performed, your family may wait in the Radiology Waiting Room on the 1st floor in Radiology  if they need to leave, they may provide a number to be called following the procedure  8  Be prepared to stay overnight just in case  Sometimes procedures will indicate the need for further observation or treatment  9  If you are scheduled for a follow-up visit with the Interventional Radiologist after your procedure, you will be called with a date and time  10  Covid vaccine and booster completed      Special Instructions (Medications to stop taking before your procedure etc ):

## 2022-06-09 ENCOUNTER — HOSPITAL ENCOUNTER (OUTPATIENT)
Dept: RADIOLOGY | Facility: HOSPITAL | Age: 67
Discharge: HOME | End: 2022-06-09
Attending: RADIOLOGY
Payer: COMMERCIAL

## 2022-06-09 ENCOUNTER — TELEPHONE (OUTPATIENT)
Dept: GYNECOLOGIC ONCOLOGY | Facility: HOSPITAL | Age: 67
End: 2022-06-09

## 2022-06-09 VITALS
SYSTOLIC BLOOD PRESSURE: 163 MMHG | OXYGEN SATURATION: 99 % | RESPIRATION RATE: 16 BRPM | HEIGHT: 59 IN | DIASTOLIC BLOOD PRESSURE: 86 MMHG | HEART RATE: 57 BPM | WEIGHT: 118 LBS | BODY MASS INDEX: 23.79 KG/M2 | TEMPERATURE: 97.7 F

## 2022-06-09 DIAGNOSIS — N13.5 OBSTRUCTION OF RIGHT URETER: ICD-10-CM

## 2022-06-09 DIAGNOSIS — C54.1 ENDOMETRIAL CANCER (HCC): Primary | ICD-10-CM

## 2022-06-09 PROCEDURE — 50434 CONVERT NEPHROSTOMY CATHETER: CPT | Performed by: RADIOLOGY

## 2022-06-09 PROCEDURE — C1769 GUIDE WIRE: HCPCS

## 2022-06-09 PROCEDURE — 50434 CONVERT NEPHROSTOMY CATHETER: CPT

## 2022-06-09 PROCEDURE — C2617 STENT, NON-COR, TEM W/O DEL: HCPCS

## 2022-06-09 PROCEDURE — 99152 MOD SED SAME PHYS/QHP 5/>YRS: CPT | Performed by: RADIOLOGY

## 2022-06-09 PROCEDURE — 99153 MOD SED SAME PHYS/QHP EA: CPT

## 2022-06-09 PROCEDURE — 99152 MOD SED SAME PHYS/QHP 5/>YRS: CPT

## 2022-06-09 PROCEDURE — C1887 CATHETER, GUIDING: HCPCS

## 2022-06-09 RX ORDER — FENTANYL CITRATE 50 UG/ML
INJECTION, SOLUTION INTRAMUSCULAR; INTRAVENOUS CODE/TRAUMA/SEDATION MEDICATION
Status: COMPLETED | OUTPATIENT
Start: 2022-06-09 | End: 2022-06-09

## 2022-06-09 RX ORDER — LEVOFLOXACIN 5 MG/ML
500 INJECTION, SOLUTION INTRAVENOUS ONCE
Status: DISCONTINUED | OUTPATIENT
Start: 2022-06-09 | End: 2022-06-10 | Stop reason: HOSPADM

## 2022-06-09 RX ORDER — ACETAMINOPHEN 325 MG/1
650 TABLET ORAL ONCE
Status: COMPLETED | OUTPATIENT
Start: 2022-06-09 | End: 2022-06-09

## 2022-06-09 RX ORDER — MIDAZOLAM HYDROCHLORIDE 2 MG/2ML
INJECTION, SOLUTION INTRAMUSCULAR; INTRAVENOUS CODE/TRAUMA/SEDATION MEDICATION
Status: COMPLETED | OUTPATIENT
Start: 2022-06-09 | End: 2022-06-09

## 2022-06-09 RX ORDER — SODIUM CHLORIDE 9 MG/ML
75 INJECTION, SOLUTION INTRAVENOUS CONTINUOUS
Status: DISCONTINUED | OUTPATIENT
Start: 2022-06-09 | End: 2022-06-10 | Stop reason: HOSPADM

## 2022-06-09 RX ADMIN — MIDAZOLAM 1 MG: 1 INJECTION INTRAMUSCULAR; INTRAVENOUS at 10:41

## 2022-06-09 RX ADMIN — FENTANYL CITRATE 50 MCG: 50 INJECTION INTRAMUSCULAR; INTRAVENOUS at 10:50

## 2022-06-09 RX ADMIN — FENTANYL CITRATE 50 MCG: 50 INJECTION INTRAMUSCULAR; INTRAVENOUS at 10:41

## 2022-06-09 RX ADMIN — SODIUM CHLORIDE 75 ML/HR: 0.9 INJECTION, SOLUTION INTRAVENOUS at 09:47

## 2022-06-09 RX ADMIN — IOHEXOL 20 ML: 300 INJECTION, SOLUTION INTRAVENOUS at 11:22

## 2022-06-09 RX ADMIN — MIDAZOLAM 1 MG: 1 INJECTION INTRAMUSCULAR; INTRAVENOUS at 10:50

## 2022-06-09 RX ADMIN — ACETAMINOPHEN 650 MG: 325 TABLET, FILM COATED ORAL at 11:53

## 2022-06-09 RX ADMIN — MORPHINE SULFATE 1 MG: 2 INJECTION, SOLUTION INTRAMUSCULAR; INTRAVENOUS at 14:03

## 2022-06-09 NOTE — INTERVAL H&P NOTE
Update: (This section must be completed if the H&P was completed greater than 24 hrs to procedure or admission)    H&P reviewed  After examining the patient, I find no changed to the H&P since it had been written  Patient re-evaluated   Accept as history and physical     Torey Mata MD/June 9, 2022/11:19 AM

## 2022-06-09 NOTE — QUICK NOTE
61year old female with endometrial cancer currently receiving chemotherapy with history of right ureteral obstruction with recent failing of capping trial     She presented today for re-conversion of right PCN to right PCNU  Nephrostogram showed occlusion of the mid right ureter  Occlusion successfully crossed with glide wire and catheter  10 F PCNU placed  After the procedure there was persistent dark blood in her bag in recovery  It was not a large volume and the patient said she has had similar experiences in the past  I discussed with both the patient and sister that her vitals are stable and I anticipate this will stop and her urine will gradually clear  She also has pressure in her pelvis that is similar to when they attempted her capping trial  Denies pain  We discussed admission vs going home tonight with close follow up  Her sister will be staying with her and I will reach out to them tomorrow  They have our number and will call with any questions or concerns  Also during recovering she had a bowel movement which also was blood tinged  I reached out to her gyn/onc team who will follow up tomorrow with her

## 2022-06-09 NOTE — BRIEF OP NOTE (RAD/CATH)
INTERVENTIONAL RADIOLOGY PROCEDURE NOTE    Date: 6/9/2022    Procedure: IR NEPHROSTOMY TO NEPHROURETERAL STENT    Preoperative diagnosis:   1  Obstruction of right ureter         Postoperative diagnosis: Same  Surgeon: Chin Whittaker MD     Assistant: None  No qualified resident was available  Blood loss: Minimal    Specimens: None     Findings: Successful crossing of mid ureteral obstruction on the right  10 F x 24 cm PCNU placed  Urine is blood tinged  Patient was instructed to leave the catheter to bag drainage until her urine clears  Once urine is clear ok to attempt capping trail  Instructed to contact IR if bleeding persists or urine becomes darker red  After capping trial the patient understands that if she has worsening pain or new fevers/chills she is to uncap the tube, place to bag drainage and call IR  Complications: None immediate      Anesthesia: conscious sedation

## 2022-06-09 NOTE — SEDATION DOCUMENTATION
Right PCNU placed by Dr Vreduzco Left without complications, pt tolerated well  Tube placed with a drainage bag, pt will be sent home with a cap  Pt instructed to keep bag draining until draining clear then place cap  If drainage does not clear after a day then call IR

## 2022-06-10 ENCOUNTER — APPOINTMENT (OUTPATIENT)
Dept: LAB | Facility: CLINIC | Age: 67
End: 2022-06-10
Payer: COMMERCIAL

## 2022-06-10 DIAGNOSIS — C54.1 ENDOMETRIAL CANCER (HCC): ICD-10-CM

## 2022-06-10 LAB
BASOPHILS # BLD AUTO: 0.02 THOUSANDS/ΜL (ref 0–0.1)
BASOPHILS NFR BLD AUTO: 0 % (ref 0–1)
EOSINOPHIL # BLD AUTO: 0 THOUSAND/ΜL (ref 0–0.61)
EOSINOPHIL NFR BLD AUTO: 0 % (ref 0–6)
ERYTHROCYTE [DISTWIDTH] IN BLOOD BY AUTOMATED COUNT: 14.2 % (ref 11.6–15.1)
HCT VFR BLD AUTO: 39 % (ref 34.8–46.1)
HGB BLD-MCNC: 11.8 G/DL (ref 11.5–15.4)
IMM GRANULOCYTES # BLD AUTO: 0.07 THOUSAND/UL (ref 0–0.2)
IMM GRANULOCYTES NFR BLD AUTO: 1 % (ref 0–2)
LYMPHOCYTES # BLD AUTO: 0.54 THOUSANDS/ΜL (ref 0.6–4.47)
LYMPHOCYTES NFR BLD AUTO: 5 % (ref 14–44)
MCH RBC QN AUTO: 32.2 PG (ref 26.8–34.3)
MCHC RBC AUTO-ENTMCNC: 30.3 G/DL (ref 31.4–37.4)
MCV RBC AUTO: 107 FL (ref 82–98)
MONOCYTES # BLD AUTO: 0.89 THOUSAND/ΜL (ref 0.17–1.22)
MONOCYTES NFR BLD AUTO: 7 % (ref 4–12)
NEUTROPHILS # BLD AUTO: 10.51 THOUSANDS/ΜL (ref 1.85–7.62)
NEUTS SEG NFR BLD AUTO: 87 % (ref 43–75)
NRBC BLD AUTO-RTO: 0 /100 WBCS
PLATELET # BLD AUTO: 283 THOUSANDS/UL (ref 149–390)
PMV BLD AUTO: 13.6 FL (ref 8.9–12.7)
RBC # BLD AUTO: 3.66 MILLION/UL (ref 3.81–5.12)
WBC # BLD AUTO: 12.03 THOUSAND/UL (ref 4.31–10.16)

## 2022-06-10 PROCEDURE — 85025 COMPLETE CBC W/AUTO DIFF WBC: CPT

## 2022-06-10 PROCEDURE — 36415 COLL VENOUS BLD VENIPUNCTURE: CPT

## 2022-06-10 NOTE — PROGRESS NOTES
Outpatient Oncology Nutrition Consult  Type of Consult: Follow Up  Care Location: [facility]   Nutrition Assessment:  Oncology Diagnosis & Treatments:   Endometrial cancer  S/p surgery 12/19/17  Recurrence 7/8/19  S/p chemotherapy (carboplatin/taxol from 7/30/19-1/6/20)  S/p whole pelvis RT (2/4/20- 4/13/20)  Disease progression  ENDOBAR trial started 12/21/20    Oncology History   Endometrial cancer ([facility] [address] )   11/17/2017 Initial Diagnosis    Endometrial cancer ([facility] [address] )     11/17/2017 Biopsy    ENDOMETRIAL BIOPSY: WELL DIFFERENTIATED ENDOMETRIAL ADENOCARCINOMA (FIGO I) WITH FOCALMUCINOUS FEATURES  Part B: ENDOCERVICAL POLYP:BENIGN ENDOCERVICAL      12/19/2017 Surgery    Robotic assisted total laparoscopic hysterectomy with bilateral salpingo-oophorectomy and sentinel bilateral pelvic lymph node dissection  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 (4 4 x 3 2 cm tumor, 9 4/15 4mm invasion, NO LVSI, washings revealed atypical cellular changes)     12/19/2017 Genetic Testing    Morrison testing negative     6/28/2019 Biopsy    A  Breast, Right, US BX Right Breast 1000 4 cmfn:  - Benign breast tissue with focal histiocytic aggregate  See comment   - Negative for atypia and in-situ or invasive carcinoma  7/8/2019 Recurrence    Presented with right lower extremity DVT and CT demonstrating right pelvic sidewall mass with venous, ureteral and nerve compression causing significant neuropathic pain  Biopsy:  Lymph Node, Right pelvic lymph node x3:  - High-grade adenocarcinoma  7/30/2019 - 1/6/2020 Chemotherapy    Taxol 175 mg/m2 and carboplatin AUC 6 every 21 days  Dose was reduced to taxol 135 mg/m2 and carboplatin AUC 5  Completed 6 cycles  Treatment protracted due to multiple hospital admissions       2/24/2020 - 4/13/2020 Radiation    adjuvant external beam radiation therapy to the whole pelvis to 4500 cGy followed by boost to gross disease of an additional 2200 cGy     11/23/2020 Progression    New necrotic adenopathy in the retroperitoneum on CT      12/21/2020 -  Chemotherapy    Began chemotherapy with rucaparib, atezolizumab, and bevacizumab as per [facility] clinical trial        PMH: RNYGB (2001 at [facility])    Past Medical History:   Diagnosis Date    Anemia     Chemotherapy follow-up examination     Depression     Endometrial cancer ([address] ) 12/2017    Hyperglycemia     vx type 2 dm -- last assessed 4/1/14; resolved 11/7/17    Hyperlipidemia     Hypertension     Obesity     last assessed 10/14/17; resolved 11/7/17     Past Surgical History:   Procedure Laterality Date    ABDOMINAL SURGERY      GASTRIC BYPASS    CHOLECYSTECTOMY      at the time of gastric bypass    COLONOSCOPY      CT NEEDLE BIOPSY LYMPH NODE  7/8/2019    FL GUIDED CENTRAL VENOUS ACCESS DEVICE INSERTION  11/12/2019    GASTRIC BYPASS      HYSTERECTOMY Bilateral     total abdominal with salpingo-oophorectomy    IR NEPHROSTOMY TO NEPHROURETERAL STENT  6/9/2022    IR NEPHROSTOMY TUBE CHECK/CHANGE/REPOSITION/REINSERTION/UPSIZE  5/27/2022    IR NEPHROSTOMY TUBE PLACEMENT  7/26/2019    IR NEPHROURETERAL STENT CHECK/CHANGE/REPOSITION  4/6/2021    IR NEPHROURETERAL STENT CHECK/CHANGE/REPOSITION  6/4/2021    IR NEPHROURETERAL STENT CHECK/CHANGE/REPOSITION  9/3/2021    IR NEPHROURETERAL STENT CHECK/CHANGE/REPOSITION  12/7/2021    IR NEPHROURETERAL STENT CHECK/CHANGE/REPOSITION  3/8/2022    IR NEPHROURETERAL STENT CHECK/CHANGE/REPOSITION  5/10/2022    IR PICC LINE  9/27/2019    IR PORT PLACEMENT  7/26/2019    IR PORT REMOVAL  9/20/2019    OOPHORECTOMY Bilateral     IN INSJ TUNNELED CTR VAD W/SUBQ PORT AGE 5 YR/> Left 11/12/2019    Procedure: INSERTION VENOUS PORT ( PORT-A-CATH) IR;  Surgeon: Walter Garces DO;  [facility]: [facility];  Service: Interventional Radiology    IN LAP, RADICAL HYST W/ TUBE&OV, NODE BX N/A 12/19/2017    Procedure: HYSTERECTOMY LAPAROSCOPIC TOTAL ([address]) W/ ROBOTICS; BILATERAL SALPINGOOPHERECTOMY; LYMPH NODE DISSECTION; lysis of adhesions;  Surgeon: Kevin Irene MD;  Location: [facility];  Service: Gynecology Oncology    IN LAP,DIAGNOSTIC ABDOMEN N/A 12/19/2017    Procedure: LAPAROSCOPY DIAGNOSTIC;  Surgeon: Kevin Irene MD;  [facility];  Service: Gynecology Oncology    TONSILLECTOMY      US GUIDED BREAST BIOPSY RIGHT COMPLETE Right 6/28/2019       Review of Medications:   Vitamins, Supplements and Herbals: yes: Hx RNYGB regimen: Vitamin D, Folic Acid, Align, calcium citrate TID, B12 1000 mcg QD, MVI BID (MVI does not have iron);  Iron 65 mg QD (2 hrs seperate from calcium)    Current Outpatient Medications:     acetaminophen-codeine (TYLENOL #3) 300-30 mg per tablet, Take 1 tablet by mouth every 6 (six) hours as needed for moderate pain, Disp: 20 tablet, Rfl: 0    ARIPiprazole (ABILIFY) 20 MG tablet, Take 20 mg by mouth in the morning , Disp: , Rfl:     aspirin (ECOTRIN LOW STRENGTH) 81 mg EC tablet, Take 81 mg by mouth daily, Disp: , Rfl:     Calcium-Magnesium-Vitamin D (CALCIUM 500 PO), Take by mouth daily , Disp: , Rfl:     cholecalciferol (VITAMIN D3) 1,000 units tablet, Take 1,000 Units by mouth daily, Disp: , Rfl:     ciprofloxacin (CIPRO) 250 mg tablet, Take 1 tablet (250 mg total) by mouth every 12 (twelve) hours for 3 days, Disp: 6 tablet, Rfl: 0    clotrimazole-betamethasone (LOTRISONE) 1-0 05 % cream, Apply topically 2 (two) times a day (Patient not taking: No sig reported), Disp: 30 g, Rfl: 0    Cyanocobalamin (VITAMIN B12 PO), Take by mouth daily , Disp: , Rfl:     dronabinol (MARINOL) 2 5 mg capsule, Take 1 capsule (2 5 mg total) by mouth 2 (two) times a day before meals (Patient taking differently: Take 2 5 mg by mouth 2 (two) times a day before meals As needed), Disp: 60 capsule, Rfl: 0    enoxaparin (LOVENOX) 80 mg/0 8 mL, Inject 0 8 mL (80 mg total) under the skin every 24 hours, Disp: 30 mL, Rfl: 5    folic acid (FOLVITE) 1 mg tablet, Take 1 tablet (1 mg total) by mouth daily, Disp: , Rfl: 0    gemfibrozil (LOPID) 600 mg tablet, TAKE 1 TABLET BY MOUTH EVERY DAY, Disp: 90 tablet, Rfl: 0    lactulose (CHRONULAC) 10 g/15 mL solution, TAKE 45 ML (30 G TOTAL) BY MOUTH 2 (TWO) TIMES A DAY as needed, Disp: 300 mL, Rfl: 2    LORazepam (ATIVAN) 0 5 mg tablet, Take 1 tablet (0 5 mg total) by mouth every 6 (six) hours as needed for anxiety (or nausea), Disp: 36 tablet, Rfl: 1    Multiple Vitamin (MULTIVITAMIN) tablet, Take 1 tablet by mouth daily, Disp: , Rfl:     naloxone (NARCAN) 4 mg/0 1 mL nasal spray, Administer 1 spray into a nostril  If no response after 2-3 minutes, give another dose in the other nostril using a new spray  (Patient not taking: Reported on 6/17/2022), Disp: 1 each, Rfl: 0    omeprazole (PriLOSEC) 20 mg delayed release capsule, Take 20 mg by mouth daily, Disp: , Rfl:     ondansetron (ZOFRAN) 8 mg tablet, Take 1 tablet (8 mg total) by mouth every 8 (eight) hours as needed for nausea or vomiting (Patient not taking: Reported on 6/17/2022), Disp: 30 tablet, Rfl: 1    oxybutynin (DITROPAN XL) 15 MG 24 hr tablet, TAKE 1 TABLET BY MOUTH DAILY AT BEDTIME, Disp: 90 tablet, Rfl: 3    oxyCODONE-acetaminophen (Percocet) 5-325 mg per tablet, Take 1 tablet by mouth every 4 (four) hours as needed for moderate pain Max Daily Amount: 6 tablets, Disp: 30 tablet, Rfl: 0    phenazopyridine (PYRIDIUM) 200 mg tablet, Take 1 tablet (200 mg total) by mouth daily as needed for bladder spasms, Disp: 30 tablet, Rfl: 0    rucaparib (RUBRACA) 300 mg tablet, Take 600 mg by mouth every 12 (twelve) hours Take with or without food    Do not repeat a vomited dose , Disp: , Rfl:     saccharomyces boulardii (FLORASTOR) 250 mg capsule, Take 1 capsule (250 mg total) by mouth 2 (two) times a day, Disp: , Rfl: 0    senna (SENOKOT) 8 6 MG tablet, Take 1 tablet (8 6 mg total) by mouth 2 (two) times a day as needed for constipation, Disp: 60 tablet, Rfl: 0   venlafaxine (EFFEXOR) 75 mg tablet, Take 75 mg by mouth 3 (three) times a day  , Disp: , Rfl:   No current facility-administered medications for this visit      Facility-Administered Medications Ordered in Other Visits:     INV bevacizumab (INVESTIGATIONAL) 775 mg in sodium chloride 0 9 % 69 mL IVPB, 775 mg, Intravenous, Once, Luli Busby PA-C    sodium chloride 0 9 % infusion, 20 mL/hr, Intravenous, Continuous, Luli Busby PA-C, Last Rate: 20 mL/hr at 06/20/22 0815, 20 mL/hr at 06/20/22 0815    Most Recent Lab Results:  Lab Results   Component Value Date    WBC 8 34 06/20/2022    IRON 92 11/19/2021    TIBC 374 11/19/2021    FERRITIN 1,429 (H) 11/19/2021    CHOLESTEROL 150 06/17/2022    CHOL 183 10/27/2017    TRIG 87 04/15/2022    HDL 72 04/15/2022    LDLCALC 100 04/15/2022    ALT 13 06/17/2022    AST 12 06/17/2022    ALB 3 1 (L) 06/17/2022     10/27/2017     05/12/2017    SODIUM 137 06/17/2022    SODIUM 135 05/26/2022    K 3 8 06/17/2022    K 3 7 05/26/2022     06/17/2022    BUN 13 06/17/2022    BUN 21 05/26/2022    CREATININE 0 94 06/17/2022    CREATININE 0 76 05/26/2022    EGFR 64 06/17/2022    PHOS 3 4 06/17/2022    PHOS 3 4 05/26/2022    GLUCOSE 219 (H) 12/19/2017    POCGLU 97 08/18/2020    GLUF 95 05/06/2022    GLUF 89 04/15/2022    GLUC 93 06/17/2022    HGBA1C 4 8 07/07/2021    HGBA1C 5 1 02/03/2020    HGBA1C 6 2 08/28/2019    CALCIUM 9 2 06/17/2022    FOLATE 5 4 10/06/2019    MG 1 8 06/17/2022     Anthropometric Measurements:   Height: 59"  Ht Readings from Last 1 Encounters:   06/20/22 4' 11" (1 499 m)     Wt Readings from Last 20 Encounters:   06/20/22 51 7 kg (114 lb)   06/17/22 51 7 kg (114 lb)   06/09/22 53 5 kg (118 lb)   06/07/22 53 5 kg (118 lb)   05/31/22 53 9 kg (118 lb 13 3 oz)   05/27/22 53 5 kg (117 lb 15 1 oz)   05/27/22 53 5 kg (118 lb)   05/18/22 54 9 kg (121 lb)   05/10/22 55 8 kg (123 lb)   05/09/22 56 kg (123 lb 8 oz)   05/06/22 55 1 kg (121 lb 8 oz) 04/18/22 55 3 kg (122 lb)   04/15/22 55 3 kg (122 lb)   04/14/22 55 3 kg (122 lb)   03/28/22 54 kg (119 lb)   03/25/22 54 6 kg (120 lb 5 9 oz)   03/18/22 56 7 kg (125 lb)   03/16/22 56 7 kg (125 lb)   03/08/22 56 7 kg (125 lb)   03/07/22 56 2 kg (124 lb)     Weight Hx:  Usual Weight: 270# before RNYGB in 2001; lowest post-op wt: 200#; wt typically fluctuates between 215-230# (prior to wt loss)  Varian: (2/25/20) 185 6#, (3/3/20) 188 6#, (3/10/20) 187 2#, (3/19/20) 186 2#, (3/24/20) 187#, (3/31/20) 185 4#, (4/10/20) 184 4#    Home Scale Wts: (2/19/20) 185#, (8/3/20) 194#    Oncology Nutrition-Anthropometrics    Flowsheet Row Nutrition from 6/20/2022 in [facility] Nutrition from 5/9/2022 in [facility]   Patient age (years): 61 years 61 years   Patient (female) height (in): 61 in 61 in   Current Weight to be used for anthropometric calculations (lbs) 114 lbs 123 5 lbs   Current Weight to be used for anthropometric calculations (kg) 51 8 kg 56 1 kg   BMI: 23 24 9   IBW female: 95 lbs 95 lbs   IBW (kg) female: 43 2 kg 43 2 kg   IBW % (female) 120 % 130 %   Adjusted BW (female): 99 8 lbs 102 1 lbs   Adjusted BW kg (female): 45 4 kg 46 4 kg   % weight change after 1 month: -5 8 % 1 2 %   Weight change after 1 month (lbs) -7 lbs 1 5 lbs   % weight change after 3 months: -8 8 % -4 3 %   Weight change after 3 months (lbs) -11 lbs -5 5 lbs   % weight change after 6 months: -- -16 %   Weight change after 6 months (lbs) -- -23 5 lbs         Nutrition-Focused Physical Findings:  severe muscle depletion ([place]) - hollowing/scooping/depression and severe body fat depletion (Orbital) - hollowing/depression/dark circles    Food/Nutrition-Related History & Client/Social History:    Current Nutrition Impact Symptoms:   [] Nausea  [x] Reduced Appetite - decreased last week but improved now [] Acid Reflux   [] Vomiting  [x] Unintended Wt Loss - -significant x1 & 3 mo [x] Malabsorption - S/p RNYGB in 2001   [] Diarrhea  [] Unintended Wt Gain  [] Dumping Syndrome   [] Constipation - denies  -On bowel protocol [] Thick Mucous/Secretions  [] Abdominal Pain    [] Dysgeusia (Altered Taste) [x] Xerostomia (Dry Mouth) - comes and goes  -uses bs/sw rinses and Biotene [] Gas    [] Dysosmia (Altered Smell)  [] Thrush  [] Difficulty Chewing    [] Oral Mucositis (Sore Mouth) - resolved  -using biotene products + bs/sw rinses [x] Fatigue - stable [x] Pain: pubic area, limits po intake   [] Odynophagia   [] Esophagitis  [] Other:    [] Dysphagia [] Early Satiety  [] No Problems Eating      Food Allergies & Intolerances: yes: had skin patch testing which showed allergy to strawberries, but says she is able to eat strawberries without any side effects    Current Diet: Small Frequent Meals  Current Nutrition Intake: Decreased since last visit - says she hardly ate for 3-4 days last week d/t issues with nephrostomy tubes   Appetite: Good   Nutrition Route: PO   Oral Care: Biotene toothpaste, rinse and spray, bs/sw rinses  Activity level: Sedentary  Limited by pain and medical condition  Usual Day Diet Recall: lost food journal; wants to start again    Breakfast: pretzels and Premier shake today (had to be here early); yesterday: 1 cup beef chili  Snack: strawberries with whipped topping  Lunch: Saturday: 1/2-1 cup [employer] chicken wild rice soup;  Yesterday: 1/2 [employer] turkey sandwich  Snack: pretzels and handful almonds  Dinner: 5 oz steak, 3/4 large sweet potato with cinnamon butter, 1 pc bread with butter  Snack: 1 peach    Beverages: water (16 9 oz x2-3), iced tea (32 oz x1, occasionally more), Premier protein shake (11 oz x1), Fairlife milk (occasionally with cereal)   -Does not drink coffee or alcohol   -Does not separate fluids from solids, does not have discomfort while eating and drinking at the same time  Supplements:   Premier Protein Shake (11 oz, 160 kcal, 30 g pro) - QD, sometmes adds water to lessen sweetness   Unjury protein powder - occasionally adds to foods such as soup, did this on Saturday, using more this past week d/t poor po intake last week    Oncology Nutrition-Estimated Needs    Flowsheet Row Nutrition from 3/28/2022 in [place] [address] [facility] Nutrition from 10/11/2021 in [place] [address] [facility]   Weight type used Actual weight Adjusted weight   Weight in kilograms (kg) used for estimated needs 54 1 kg 49 5 kg   Energy needs formula:  35-40 kcal/kg 35-40 kcal/kg   Energy needs based on 35 kcal/k kcal 1733 kcal   Energy needs based on 40 kcal/k kcal 1980 kcal   Protein needs formula: 1 5-2 g/kg 1 5-2 g/kg   Protein needs based on 1 5 g/kg 81 g 74 g   Protein needs based on 2 g/kg 108 g 99 g   Fluid needs formula: 30-35 mL/kg 30-35 mL/kg   Fluid needs based on 30 mL/kg 1623 mL 1485 mL   Fluid needs in ounces 55 oz 50 oz   Fluid needs based on 35 mL/kg 1894 mL 1733 mL   Fluid needs in ounces 64 oz 59 oz        Discussion & Intervention:    Ragini was evaluated today for an RD follow up regarding wt loss and pt request for dietitian involvement throughout tx  Ragini is currently undergoing tx for endometrial cancer  Ragini reports a difficult week last week d/t issues with her nephrostomy tubes which caused her not to eat for 3-4 days  She has lost a significant amount of wt  She says she is now feeling better and her appetite has improved  She continues to use Premier shakes daily and says she has increased her Unjury protein powder intake over the past week in an attempt to regain wt  She has not been food journaling but plans to get back to it  She seems to be hydrating well and has not been experiencing constipaton recently  Moving forward, she wishes to focus on the same goals as last time     Reviewed 24 hour recall, which revealed an inadequate po intake, and discussed ways to increase kcal, protein, and fluid intakes and optimize nutrient intake  Also reviewed the importance of wt management throughout the tx process and the role of a high kcal/ high protein diet in managing wt and overall health  Based on today's assessment, discussion included: MNT for: wt loss, a high kcal/protein diet & food choices to include at all meals & snacks (Examples of high kcal foods: cheese, full-fat dairy products, nut butter, plant-based fats, coconut oil/milk, avocado, butter, cream soup, etc  Examples of high protein foods: eggs, chicken, fish, beans/legumes, nuts/nut butters, bone broth, etc ) , fortifying foods for added kcal and protein (examples include: adding cheese to foods such as eggs, mashed potatoes, casseroles, etc ; Making oatmeal with whole milk rather than water; Making fortified mashed potatoes with cream, butter, dry milk powder, plain [place] yogurt, and cheese ), adequate hydration & fluid choices, sipping on calorie containing beverages (examples include: adding whole milk or cream to coffee, oral nutrition supplements, juice, electrolyte replacement beverages, milk, etc ), eating smaller more frequent meals every 2-3 hours (5-6 small meals/day), eating when feeling most hungry, utilizing oral nutrition supplements and recipe suggestions/resources, trying new recipes, & cooking at home more often  Moving forward, Ragini was encouraged to increase kcal, protein, and fluid intakes  She does best with self-directed goals and has chosen goals for herself moving forward; RD guidance provided during goal setting  Materials Provided: not applicable  All questions and concerns addressed during todays visit  Ragini has RD contact information  Nutrition Diagnosis:  Inadequate Energy Intake related to physiological causes, disease state and treatment related issues as evidenced by food recall, wt loss and discussion with pt and/or family    Increased Nutrient Needs (kcal & pro) related to increased demand for nutrients and disease state as evidenced by cancer dx and pt undergoing tx for cancer  Patient has clinical indicators (or ASPEN criteria) consistent with severe protein-calorie malnutrition in the context of Chronic Illness as evidenced by >5% wt loss in 1 month, </=75% energy intake vs  Estimated needs for >/=1 month, severe muscle depletion ([place]) - hollowing/scooping/depression and severe body fat depletion (Orbital) - hollowing/depression/dark circles  Monitoring & Evaluation:   Goals:  weight maintenance/stabilization  adequate nutrition impact symptom management  pt to meet >/=75% estimated nutrition needs daily     Progress Towards Goals: Not Met    Nutrition Rx & Recommendations:  Diet: High Calorie, High Protein (for high calorie foods see pages 52-53, and for high protein foods see pages 49-51 in your Eating Hints book)  Small, frequent meals/snacks may be easier to tolerate than 3 large daily meals  Aim for 5-6 small meals per day (every 2-3 hours)  Include protein at all meals/snacks  Follow proper oral care; Try baking soda/salt water rinse recipe (mix 3/4 tsp salt + 1 tsp baking soda + 1 qt water; rinse with plain water after using) in Eating Hints book (pg 18)  Brush your teeth before/after meals & before bed  For appetite loss: try powdered or liquid nutrition supplements; eating by the clock every 2-3 hours; set a timer to remind yourself to eat, keep snacks nearby; add extra protein & calories to your diet; drink liquids in between meals, choose liquids that add calories; eat a bedtime snack; eat soft, cool or frozen foods; eat a larger meal when you feel well & rested; only sip small amounts of liquids during meals (see pages 10-12 in your Eating Hints book)    For weight loss: monitor your weight at home at least 2x/week, record your weight, start by adding 250-500 extra calories per day, eat 5-6 small scheduled meals every 2-3 hrs, choose foods that are high in protein and calories (see pages 49-53 in your Eating Hints book), drink liquids with calories for example: milkshakes/smoothies/juice/soup/whole milk/chocolate milk, cook with protein fortified milk (see recipe on page 36 in your Eating Hints book), consider ready-to-drink oral nutrition supplements such as Ensure Plus, Ensure Enlive, Boost Plus, or Boost Very High Calorie, avoid "diet" and "light" foods when possible, avoid drinking too much with meals, contact your dietitian with any continued weight loss over the course of 1 week  For more info see pages 35-37 in your Eating Hints book  Weigh yourself regularly  If you notice weight loss, make an effort to increase your daily food/calorie intake  If you continue to notice loss after these efforts, reach out to your dietitian to establish a plan to stabilize weight     Snack ideas: [place] yogurt with fruit, pudding, veggies with humus OR try greek yogurt based vegetable dip, peanut butter, grapes and cheese, fruit with a cheese stick , cottage cheese, soup, unjury, protein shakes/smoothies  Continue to keep a food journal as long as it is helpful  Try to eat something every 1 hr, set alarm on your phone as a reminder    Patient choosen goals:  Resume food journal  Increase protein intake by adding protein to snacks  Scoop of pb, 1/2 Premier, cheese, protein wafers, eggs, [place] yogurt  Nutrition Supplements:    Continue Premier Protein Shake 1X/day  Add Unjury protein powder to foods as needed    Follow Up Plan: 7/11 during infusion   Recommend Referral to Other Providers: none at this time

## 2022-06-10 NOTE — QUICK NOTE
61year old female with endometrial cancer currently receiving chemotherapy with history of right ureteral obstruction with recent failing of capping trial     Patient reports persistent bleeding into nephrostomy bag similar in color to yesterday  She had a CBC drawn today order by gyn/onc due to blood in stool which will allow us to see if her hgb has changed  I discussed with her that if the bleeding is similar tomorrow then she should hold her lovenox  If her hgb has trended down significantly may require admission for monitoring

## 2022-06-13 DIAGNOSIS — C54.1 ENDOMETRIAL CANCER (HCC): Primary | ICD-10-CM

## 2022-06-15 ENCOUNTER — TELEPHONE (OUTPATIENT)
Dept: GYNECOLOGIC ONCOLOGY | Facility: CLINIC | Age: 67
End: 2022-06-15

## 2022-06-15 ENCOUNTER — DOCUMENTATION (OUTPATIENT)
Dept: OTHER | Facility: HOSPITAL | Age: 67
End: 2022-06-15

## 2022-06-15 DIAGNOSIS — I82.4Y1 ACUTE DEEP VEIN THROMBOSIS (DVT) OF PROXIMAL VEIN OF RIGHT LOWER EXTREMITY (HCC): ICD-10-CM

## 2022-06-15 NOTE — TELEPHONE ENCOUNTER
Patient called today to find out if her lovenox was approved yet  She is anxious   Please call the patient back [phone]

## 2022-06-15 NOTE — PROGRESS NOTES
Telephone from patient on 6/14/2022  Patient called CRN to inform us that she canceled her CT scan scheduled for 6/14/2022 due to her not feeling well post her nephrostomy tube change   Patient stated " she is not feeing up to any diagnostic test at this time"  CRN will reschedule when patient is feeling better

## 2022-06-17 ENCOUNTER — APPOINTMENT (OUTPATIENT)
Dept: LAB | Facility: HOSPITAL | Age: 67
End: 2022-06-17
Attending: OBSTETRICS & GYNECOLOGY
Payer: COMMERCIAL

## 2022-06-17 ENCOUNTER — HOSPITAL ENCOUNTER (OUTPATIENT)
Dept: INFUSION CENTER | Facility: HOSPITAL | Age: 67
Discharge: HOME | End: 2022-06-17
Payer: COMMERCIAL

## 2022-06-17 ENCOUNTER — OFFICE VISIT (OUTPATIENT)
Dept: GYNECOLOGIC ONCOLOGY | Facility: CLINIC | Age: 67
End: 2022-06-17

## 2022-06-17 ENCOUNTER — TELEPHONE (OUTPATIENT)
Dept: GYNECOLOGIC ONCOLOGY | Facility: CLINIC | Age: 67
End: 2022-06-17

## 2022-06-17 ENCOUNTER — DOCUMENTATION (OUTPATIENT)
Dept: OTHER | Facility: HOSPITAL | Age: 67
End: 2022-06-17

## 2022-06-17 VITALS
HEIGHT: 59 IN | BODY MASS INDEX: 22.98 KG/M2 | OXYGEN SATURATION: 78 % | RESPIRATION RATE: 16 BRPM | DIASTOLIC BLOOD PRESSURE: 70 MMHG | SYSTOLIC BLOOD PRESSURE: 114 MMHG | TEMPERATURE: 97.1 F | WEIGHT: 114 LBS | HEART RATE: 68 BPM

## 2022-06-17 DIAGNOSIS — C54.1 ENDOMETRIAL CANCER (HCC): Primary | ICD-10-CM

## 2022-06-17 DIAGNOSIS — C54.1 ENDOMETRIAL CANCER (HCC): ICD-10-CM

## 2022-06-17 DIAGNOSIS — D62 ACUTE BLOOD LOSS ANEMIA: ICD-10-CM

## 2022-06-17 LAB
ALBUMIN SERPL BCP-MCNC: 3.1 G/DL (ref 3.5–5)
ALP SERPL-CCNC: 130 U/L (ref 46–116)
ALT SERPL W P-5'-P-CCNC: 13 U/L (ref 12–78)
AMYLASE SERPL-CCNC: 26 IU/L (ref 25–115)
ANION GAP SERPL CALCULATED.3IONS-SCNC: 6 MMOL/L (ref 4–13)
APTT PPP: 39 SECONDS (ref 23–37)
AST SERPL W P-5'-P-CCNC: 12 U/L (ref 5–45)
ATRIAL RATE: 57 BPM
BASOPHILS # BLD AUTO: 0.05 THOUSANDS/ΜL (ref 0–0.1)
BASOPHILS NFR BLD AUTO: 1 % (ref 0–1)
BILIRUB SERPL-MCNC: 0.2 MG/DL (ref 0.2–1)
BUN SERPL-MCNC: 13 MG/DL (ref 5–25)
CALCIUM ALBUM COR SERPL-MCNC: 9.9 MG/DL (ref 8.3–10.1)
CALCIUM SERPL-MCNC: 9.2 MG/DL (ref 8.3–10.1)
CHLORIDE SERPL-SCNC: 106 MMOL/L (ref 100–108)
CHOLEST SERPL-MCNC: 150 MG/DL
CO2 SERPL-SCNC: 25 MMOL/L (ref 21–32)
CREAT SERPL-MCNC: 0.94 MG/DL (ref 0.6–1.3)
EOSINOPHIL # BLD AUTO: 0.01 THOUSAND/ΜL (ref 0–0.61)
EOSINOPHIL NFR BLD AUTO: 0 % (ref 0–6)
ERYTHROCYTE [DISTWIDTH] IN BLOOD BY AUTOMATED COUNT: 13.9 % (ref 11.6–15.1)
GFR SERPL CREATININE-BSD FRML MDRD: 64 ML/MIN/1.73SQ M
GGT SERPL-CCNC: 13 U/L (ref 5–85)
GLUCOSE SERPL-MCNC: 93 MG/DL (ref 65–140)
HCT VFR BLD AUTO: 26.9 % (ref 34.8–46.1)
HGB BLD-MCNC: 8.6 G/DL (ref 11.5–15.4)
IMM GRANULOCYTES # BLD AUTO: 0.15 THOUSAND/UL (ref 0–0.2)
IMM GRANULOCYTES NFR BLD AUTO: 2 % (ref 0–2)
INR PPP: 1.03 (ref 0.84–1.19)
LIPASE SERPL-CCNC: 86 U/L (ref 73–393)
LYMPHOCYTES # BLD AUTO: 0.6 THOUSANDS/ΜL (ref 0.6–4.47)
LYMPHOCYTES NFR BLD AUTO: 8 % (ref 14–44)
MAGNESIUM SERPL-MCNC: 1.8 MG/DL (ref 1.6–2.6)
MCH RBC QN AUTO: 32.2 PG (ref 26.8–34.3)
MCHC RBC AUTO-ENTMCNC: 32 G/DL (ref 31.4–37.4)
MCV RBC AUTO: 101 FL (ref 82–98)
MONOCYTES # BLD AUTO: 0.68 THOUSAND/ΜL (ref 0.17–1.22)
MONOCYTES NFR BLD AUTO: 9 % (ref 4–12)
NEUTROPHILS # BLD AUTO: 5.85 THOUSANDS/ΜL (ref 1.85–7.62)
NEUTS SEG NFR BLD AUTO: 80 % (ref 43–75)
NRBC BLD AUTO-RTO: 0 /100 WBCS
P AXIS: 56 DEGREES
PHOSPHATE SERPL-MCNC: 3.4 MG/DL (ref 2.3–4.1)
PLATELET # BLD AUTO: 307 THOUSANDS/UL (ref 149–390)
PMV BLD AUTO: 10.5 FL (ref 8.9–12.7)
POTASSIUM SERPL-SCNC: 3.8 MMOL/L (ref 3.5–5.3)
PR INTERVAL: 144 MS
PROT SERPL-MCNC: 7.8 G/DL (ref 6.4–8.2)
PROTHROMBIN TIME: 13.1 SECONDS (ref 11.6–14.5)
QRS AXIS: -26 DEGREES
QRSD INTERVAL: 86 MS
QT INTERVAL: 432 MS
QTC INTERVAL: 420 MS
RBC # BLD AUTO: 2.67 MILLION/UL (ref 3.81–5.12)
SODIUM SERPL-SCNC: 137 MMOL/L (ref 136–145)
T WAVE AXIS: 39 DEGREES
T3 SERPL-MCNC: 0.6 NG/ML (ref 0.6–1.8)
T4 FREE SERPL-MCNC: 1.16 NG/DL (ref 0.76–1.46)
TSH SERPL DL<=0.05 MIU/L-ACNC: 1.21 UIU/ML (ref 0.45–4.5)
VENTRICULAR RATE: 57 BPM
WBC # BLD AUTO: 7.34 THOUSAND/UL (ref 4.31–10.16)

## 2022-06-17 PROCEDURE — 84443 ASSAY THYROID STIM HORMONE: CPT

## 2022-06-17 PROCEDURE — 82150 ASSAY OF AMYLASE: CPT

## 2022-06-17 PROCEDURE — 84480 ASSAY TRIIODOTHYRONINE (T3): CPT

## 2022-06-17 PROCEDURE — 93005 ELECTROCARDIOGRAM TRACING: CPT

## 2022-06-17 PROCEDURE — 85730 THROMBOPLASTIN TIME PARTIAL: CPT

## 2022-06-17 PROCEDURE — 85025 COMPLETE CBC W/AUTO DIFF WBC: CPT

## 2022-06-17 PROCEDURE — 82977 ASSAY OF GGT: CPT

## 2022-06-17 PROCEDURE — 93010 ELECTROCARDIOGRAM REPORT: CPT | Performed by: INTERNAL MEDICINE

## 2022-06-17 PROCEDURE — 84100 ASSAY OF PHOSPHORUS: CPT

## 2022-06-17 PROCEDURE — 80053 COMPREHEN METABOLIC PANEL: CPT

## 2022-06-17 PROCEDURE — 85610 PROTHROMBIN TIME: CPT

## 2022-06-17 PROCEDURE — 83735 ASSAY OF MAGNESIUM: CPT

## 2022-06-17 PROCEDURE — 84439 ASSAY OF FREE THYROXINE: CPT

## 2022-06-17 PROCEDURE — 83690 ASSAY OF LIPASE: CPT

## 2022-06-17 PROCEDURE — 82465 ASSAY BLD/SERUM CHOLESTEROL: CPT

## 2022-06-17 PROCEDURE — 3008F BODY MASS INDEX DOCD: CPT | Performed by: NURSE PRACTITIONER

## 2022-06-17 RX ORDER — SODIUM CHLORIDE 9 MG/ML
20 INJECTION, SOLUTION INTRAVENOUS CONTINUOUS
Status: DISCONTINUED | OUTPATIENT
Start: 2022-06-20 | End: 2022-06-23 | Stop reason: HOSPADM

## 2022-06-17 NOTE — PROGRESS NOTES
Patient was seen in the office on 6/17/2022 for her Cycle 27 Day 1 pretreatment office visit for [facility] with Luli Busby, BHAVYA  Patient states that she is doing well   She still gets occasional mouth sores that she uses biotene mouth wash as well as a salt water rinse that helps  Patient states that she has none at this time though  She still has occasional constipation that is manageable  She recently started senokot in place of the lactulose  Patient recently had her nephrostomy tube changed that caused her significant pain and some bleeding this time around  Patient stated that it effected her for 5 days  This was why she cancelled her CT scan  Due to the national shortage in contrast dye patient was not able to be rescheduled to 7/18  Per protocol patients next scan will be 8/5 so PAC was in agreement that this CT scan would not be completed and resumed at the next scheduled time point  All AEs and Conmeds were reviewed   Future appointments were reviewed  Overall, patient states that she is feeling okay, and was told to call with any questions or concerns

## 2022-06-17 NOTE — PROGRESS NOTES
Assessment/Plan:    Problem List Items Addressed This Visit        Genitourinary    Endometrial cancer ([facility] [address] ) - Primary (Chronic)     Recurrent stage IB endometrial cancer currently on ENDOBARR clinical trial presents  She has received 26 cycles  Overall, she is tolerating treatment well  She has treatment related fatigue, which is stable  Recently she under IR procedure with bleeding complication      Labs from 6/17/22 pending  Proceed with next cycle of treatment as scheduled       CT imaging per protocol scheduled August       Return to the office per protocol             Relevant Orders    CBC and differential       Other    Acute blood loss anemia     Likely result of recent IR procedure  Downward trend 11 2 g/dL to 8 6 g/dL (today)  Will repeat CBC on Monday with treatment, as patient recently re-started lovenox  She is currently without active bleeding into her nephrostomy tube bag  CHIEF COMPLAINT:   Follow-up on clinical trial, pre-cycle 27    Problem:  Cancer Staging  Endometrial cancer [facility])  Staging form: Corpus Uteri - Carcinoma, AJCC 7th Edition  - Clinical stage from 12/19/2017: FIGO Stage IB (T1b, N0, M0) - Signed by Shasta Dash MD on 2/12/2018        Previous therapy:  Oncology History   Endometrial cancer ([facility] [address] )   11/17/2017 Initial Diagnosis    Endometrial cancer ([facility] [address] )     11/17/2017 Biopsy    ENDOMETRIAL BIOPSY: WELL DIFFERENTIATED ENDOMETRIAL ADENOCARCINOMA (FIGO I) WITH FOCALMUCINOUS FEATURES  Part B: ENDOCERVICAL POLYP:BENIGN ENDOCERVICAL      12/19/2017 Surgery    Robotic assisted total laparoscopic hysterectomy with bilateral salpingo-oophorectomy and sentinel bilateral pelvic lymph node dissection  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 (4 4 x 3 2 cm tumor, 9 4/15 4mm invasion, NO LVSI, washings revealed atypical cellular changes)     12/19/2017 Genetic Testing    Morrison testing negative     6/28/2019 Biopsy    A   Breast, Right, US BX Right Breast 1000 4 cmfn:  - Benign breast tissue with focal histiocytic aggregate  See comment   - Negative for atypia and in-situ or invasive carcinoma  7/8/2019 Recurrence    Presented with right lower extremity DVT and CT demonstrating right pelvic sidewall mass with venous, ureteral and nerve compression causing significant neuropathic pain  Biopsy:  Lymph Node, Right pelvic lymph node x3:  - High-grade adenocarcinoma  7/30/2019 - 1/6/2020 Chemotherapy    Taxol 175 mg/m2 and carboplatin AUC 6 every 21 days  Dose was reduced to taxol 135 mg/m2 and carboplatin AUC 5  Completed 6 cycles  Treatment protracted due to multiple hospital admissions  2/24/2020 - 4/13/2020 Radiation    adjuvant external beam radiation therapy to the whole pelvis to 4500 cGy followed by boost to gross disease of an additional 2200 cGy     11/23/2020 Progression    New necrotic adenopathy in the retroperitoneum on CT      12/21/2020 -  Chemotherapy    Began chemotherapy with rucaparib, atezolizumab, and bevacizumab as per [facility] clinical trial            Patient ID: Juancarlos Yoon is a 61 y o  female  who presents to the office for evaluation while on clinical trial  The patient is without new complaints today  She has been afebrile  The patient notes fatigue  She underwent IR procedure last week, and experienced some post-procedural bleeding  Her lovenox has been held x 1 week  She has recovered from her procedure  She denies abdominal, n/v  Appetite is appropriate  She denies skin rashes, diarrhea, blood in stool, upper abdominal pain or blood pressure concerns  Majority of labs from 6/17/22 are pending  Hgb result is 8 6 (down from 11 2)  The following portions of the patient's history were reviewed and updated as appropriate: allergies, current medications, past medical history, past surgical history and problem list     Review of Systems   Constitutional: Positive for fatigue  Negative for fever     HENT: Negative  Eyes: Negative  Respiratory: Negative  Cardiovascular: Negative  Gastrointestinal: Negative  Genitourinary: Negative  Musculoskeletal: Negative  Skin: Negative  Neurological: Negative  Psychiatric/Behavioral: Negative  Current Outpatient Medications   Medication Sig Dispense Refill    acetaminophen-codeine (TYLENOL #3) 300-30 mg per tablet Take 1 tablet by mouth every 6 (six) hours as needed for moderate pain 20 tablet 0    ARIPiprazole (ABILIFY) 20 MG tablet Take 20 mg by mouth in the morning        aspirin (ECOTRIN LOW STRENGTH) 81 mg EC tablet Take 81 mg by mouth daily      Calcium-Magnesium-Vitamin D (CALCIUM 500 PO) Take by mouth daily       cholecalciferol (VITAMIN D3) 1,000 units tablet Take 1,000 Units by mouth daily      Cyanocobalamin (VITAMIN B12 PO) Take by mouth daily       dronabinol (MARINOL) 2 5 mg capsule Take 1 capsule (2 5 mg total) by mouth 2 (two) times a day before meals (Patient taking differently: Take 2 5 mg by mouth 2 (two) times a day before meals As needed) 60 capsule 0    enoxaparin (LOVENOX) 80 mg/0 8 mL Inject 0 8 mL (80 mg total) under the skin every 24 hours 30 mL 5    folic acid (FOLVITE) 1 mg tablet Take 1 tablet (1 mg total) by mouth daily  0    gemfibrozil (LOPID) 600 mg tablet TAKE 1 TABLET BY MOUTH EVERY DAY 90 tablet 0    lactulose (CHRONULAC) 10 g/15 mL solution TAKE 45 ML (30 G TOTAL) BY MOUTH 2 (TWO) TIMES A DAY as needed 300 mL 2    LORazepam (ATIVAN) 0 5 mg tablet Take 1 tablet (0 5 mg total) by mouth every 6 (six) hours as needed for anxiety (or nausea) 36 tablet 1    Multiple Vitamin (MULTIVITAMIN) tablet Take 1 tablet by mouth daily      omeprazole (PriLOSEC) 20 mg delayed release capsule Take 20 mg by mouth daily      oxybutynin (DITROPAN XL) 15 MG 24 hr tablet TAKE 1 TABLET BY MOUTH DAILY AT BEDTIME 90 tablet 3    oxyCODONE-acetaminophen (Percocet) 5-325 mg per tablet Take 1 tablet by mouth every 4 (four) hours as needed for moderate pain Max Daily Amount: 6 tablets 30 tablet 0    phenazopyridine (PYRIDIUM) 200 mg tablet Take 1 tablet (200 mg total) by mouth daily as needed for bladder spasms 30 tablet 0    rucaparib (RUBRACA) 300 mg tablet Take 600 mg by mouth every 12 (twelve) hours Take with or without food  Do not repeat a vomited dose   saccharomyces boulardii (FLORASTOR) 250 mg capsule Take 1 capsule (250 mg total) by mouth 2 (two) times a day  0    senna (SENOKOT) 8 6 MG tablet Take 1 tablet (8 6 mg total) by mouth 2 (two) times a day as needed for constipation 60 tablet 0    venlafaxine (EFFEXOR) 75 mg tablet Take 75 mg by mouth 3 (three) times a day        clotrimazole-betamethasone (LOTRISONE) 1-0 05 % cream Apply topically 2 (two) times a day (Patient not taking: No sig reported) 30 g 0    naloxone (NARCAN) 4 mg/0 1 mL nasal spray Administer 1 spray into a nostril  If no response after 2-3 minutes, give another dose in the other nostril using a new spray  (Patient not taking: Reported on 6/17/2022) 1 each 0    ondansetron (ZOFRAN) 8 mg tablet Take 1 tablet (8 mg total) by mouth every 8 (eight) hours as needed for nausea or vomiting (Patient not taking: Reported on 6/17/2022) 30 tablet 1     No current facility-administered medications for this visit  Objective:    Blood pressure 114/70, pulse 68, temperature (!) 97 1 °F (36 2 °C), temperature source Temporal, resp  rate 16, height 4' 11" (1 499 m), weight 51 7 kg (114 lb), SpO2 (!) 78 %, not currently breastfeeding  Body mass index is 23 03 kg/m²  Body surface area is 1 45 meters squared  Physical Exam  Vitals reviewed  Constitutional:       General: She is not in acute distress  Appearance: Normal appearance  She is not ill-appearing  HENT:      Head: Normocephalic and atraumatic  Mouth/Throat:      Mouth: Mucous membranes are moist    Eyes:      General: No scleral icterus  Right eye: No discharge  Left eye: No discharge  Conjunctiva/sclera: Conjunctivae normal    Pulmonary:      Effort: Pulmonary effort is normal    Musculoskeletal:      Right lower leg: No edema  Left lower leg: No edema  Skin:     General: Skin is warm and dry  Coloration: Skin is not jaundiced  Findings: No rash  Neurological:      General: No focal deficit present  Mental Status: She is alert and oriented to person, place, and time  Cranial Nerves: No cranial nerve deficit  Sensory: No sensory deficit  Motor: No weakness  Gait: Gait normal    Psychiatric:         Mood and Affect: Mood normal          Behavior: Behavior normal          Thought Content: Thought content normal          Judgment: Judgment normal      Performance status is zero           Lab Results   Component Value Date     10/27/2017    K 3 8 06/17/2022     06/17/2022    CO2 25 06/17/2022    BUN 13 06/17/2022    CREATININE 0 94 06/17/2022    GLUCOSE 219 (H) 12/19/2017    GLUF 95 05/06/2022    CALCIUM 9 2 06/17/2022    CORRECTEDCA 9 9 06/17/2022    AST 12 06/17/2022    ALT 13 06/17/2022    ALKPHOS 130 (H) 06/17/2022    PROT 6 9 10/27/2017    BILITOT 0 3 10/27/2017    EGFR 64 06/17/2022     Lab Results   Component Value Date    WBC 7 34 06/17/2022    HGB 8 6 (L) 06/17/2022    HCT 26 9 (L) 06/17/2022     (H) 06/17/2022     06/17/2022     Lab Results   Component Value Date    NEUTROABS 5 85 06/17/2022

## 2022-06-17 NOTE — ASSESSMENT & PLAN NOTE
Likely result of recent IR procedure  Downward trend 11 2 g/dL to 8 6 g/dL (today)  Will repeat CBC on Monday with treatment, as patient recently re-started lovenox  She is currently without active bleeding into her nephrostomy tube bag

## 2022-06-17 NOTE — PROGRESS NOTES
Port flushed and central labs drawn  Patient unable to void  Requests urine cup and states she will drop off sample later at [place] lab  Sterile cup and lab slips for urine given to patient  Next appointment confirmed

## 2022-06-17 NOTE — ASSESSMENT & PLAN NOTE
Recurrent stage IB endometrial cancer currently on ENDOBARR clinical trial presents  She has received 26 cycles  Overall, she is tolerating treatment well  She has treatment related fatigue, which is stable  Recently she under IR procedure with bleeding complication      Labs from 6/17/22 pending   Proceed with next cycle of treatment as scheduled       CT imaging per protocol scheduled August       Return to the office per protocol

## 2022-06-18 ENCOUNTER — APPOINTMENT (OUTPATIENT)
Dept: LAB | Facility: CLINIC | Age: 67
End: 2022-06-18
Payer: COMMERCIAL

## 2022-06-18 DIAGNOSIS — C54.1 ENDOMETRIAL CANCER (HCC): ICD-10-CM

## 2022-06-18 LAB
ANISOCYTOSIS BLD QL SMEAR: PRESENT
BACTERIA UR QL AUTO: ABNORMAL /HPF
BASOPHILS # BLD MANUAL: 0.08 THOUSAND/UL (ref 0–0.1)
BASOPHILS NFR MAR MANUAL: 1 % (ref 0–1)
BILIRUB UR QL STRIP: NEGATIVE
CLARITY UR: ABNORMAL
COLOR UR: YELLOW
CREAT UR-MCNC: 12.4 MG/DL
EOSINOPHIL # BLD MANUAL: 0 THOUSAND/UL (ref 0–0.4)
EOSINOPHIL NFR BLD MANUAL: 0 % (ref 0–6)
ERYTHROCYTE [DISTWIDTH] IN BLOOD BY AUTOMATED COUNT: 13.8 % (ref 11.6–15.1)
GLUCOSE UR STRIP-MCNC: NEGATIVE MG/DL
HCT VFR BLD AUTO: 29.2 % (ref 34.8–46.1)
HGB BLD-MCNC: 9.1 G/DL (ref 11.5–15.4)
HGB UR QL STRIP.AUTO: ABNORMAL
HYPERCHROMIA BLD QL SMEAR: PRESENT
KETONES UR STRIP-MCNC: NEGATIVE MG/DL
LEUKOCYTE ESTERASE UR QL STRIP: ABNORMAL
LYMPHOCYTES # BLD AUTO: 1.02 THOUSAND/UL (ref 0.6–4.47)
LYMPHOCYTES # BLD AUTO: 13 % (ref 14–44)
MCH RBC QN AUTO: 32 PG (ref 26.8–34.3)
MCHC RBC AUTO-ENTMCNC: 31.2 G/DL (ref 31.4–37.4)
MCV RBC AUTO: 103 FL (ref 82–98)
MONOCYTES # BLD AUTO: 0.31 THOUSAND/UL (ref 0–1.22)
MONOCYTES NFR BLD: 4 % (ref 4–12)
MYELOCYTES NFR BLD MANUAL: 1 % (ref 0–1)
NEUTROPHILS # BLD MANUAL: 6.34 THOUSAND/UL (ref 1.85–7.62)
NEUTS BAND NFR BLD MANUAL: 2 % (ref 0–8)
NEUTS SEG NFR BLD AUTO: 79 % (ref 43–75)
NITRITE UR QL STRIP: NEGATIVE
NON-SQ EPI CELLS URNS QL MICRO: ABNORMAL /HPF
PH UR STRIP.AUTO: 6 [PH]
PLATELET # BLD AUTO: 369 THOUSANDS/UL (ref 149–390)
PLATELET BLD QL SMEAR: ADEQUATE
PMV BLD AUTO: 10.8 FL (ref 8.9–12.7)
PROT UR STRIP-MCNC: NEGATIVE MG/DL
PROT UR-MCNC: 32 MG/DL
PROT/CREAT UR: 2.58 MG/G{CREAT} (ref 0–0.1)
RBC # BLD AUTO: 2.84 MILLION/UL (ref 3.81–5.12)
RBC #/AREA URNS AUTO: ABNORMAL /HPF
RBC MORPH BLD: PRESENT
SP GR UR STRIP.AUTO: <=1.005 (ref 1–1.03)
UROBILINOGEN UR QL STRIP.AUTO: 0.2 E.U./DL
WBC # BLD AUTO: 7.83 THOUSAND/UL (ref 4.31–10.16)
WBC #/AREA URNS AUTO: ABNORMAL /HPF

## 2022-06-18 PROCEDURE — 81001 URINALYSIS AUTO W/SCOPE: CPT

## 2022-06-18 PROCEDURE — 85027 COMPLETE CBC AUTOMATED: CPT

## 2022-06-18 PROCEDURE — 84156 ASSAY OF PROTEIN URINE: CPT

## 2022-06-18 PROCEDURE — 82570 ASSAY OF URINE CREATININE: CPT

## 2022-06-18 PROCEDURE — 85007 BL SMEAR W/DIFF WBC COUNT: CPT

## 2022-06-18 PROCEDURE — 36415 COLL VENOUS BLD VENIPUNCTURE: CPT

## 2022-06-20 ENCOUNTER — HOSPITAL ENCOUNTER (OUTPATIENT)
Dept: INFUSION CENTER | Facility: CLINIC | Age: 67
Discharge: HOME | End: 2022-06-20
Payer: COMMERCIAL

## 2022-06-20 ENCOUNTER — TELEPHONE (OUTPATIENT)
Dept: GYNECOLOGIC ONCOLOGY | Facility: CLINIC | Age: 67
End: 2022-06-20

## 2022-06-20 ENCOUNTER — NUTRITION (OUTPATIENT)
Dept: NUTRITION | Facility: CLINIC | Age: 67
End: 2022-06-20

## 2022-06-20 ENCOUNTER — DOCUMENTATION (OUTPATIENT)
Dept: OTHER | Facility: HOSPITAL | Age: 67
End: 2022-06-20

## 2022-06-20 VITALS
TEMPERATURE: 96.8 F | HEIGHT: 59 IN | BODY MASS INDEX: 22.98 KG/M2 | HEART RATE: 60 BPM | RESPIRATION RATE: 18 BRPM | WEIGHT: 114 LBS | OXYGEN SATURATION: 100 % | SYSTOLIC BLOOD PRESSURE: 138 MMHG | DIASTOLIC BLOOD PRESSURE: 86 MMHG

## 2022-06-20 DIAGNOSIS — Z71.3 NUTRITIONAL COUNSELING: Primary | ICD-10-CM

## 2022-06-20 DIAGNOSIS — R30.0 DYSURIA: Primary | ICD-10-CM

## 2022-06-20 DIAGNOSIS — C54.1 ENDOMETRIAL CANCER (HCC): ICD-10-CM

## 2022-06-20 LAB
BASOPHILS # BLD AUTO: 0.05 THOUSANDS/ΜL (ref 0–0.1)
BASOPHILS NFR BLD AUTO: 1 % (ref 0–1)
EOSINOPHIL # BLD AUTO: 0.02 THOUSAND/ΜL (ref 0–0.61)
EOSINOPHIL NFR BLD AUTO: 0 % (ref 0–6)
ERYTHROCYTE [DISTWIDTH] IN BLOOD BY AUTOMATED COUNT: 14.2 % (ref 11.6–15.1)
HCT VFR BLD AUTO: 27.4 % (ref 34.8–46.1)
HGB BLD-MCNC: 8.5 G/DL (ref 11.5–15.4)
IMM GRANULOCYTES # BLD AUTO: 0.13 THOUSAND/UL (ref 0–0.2)
IMM GRANULOCYTES NFR BLD AUTO: 2 % (ref 0–2)
LYMPHOCYTES # BLD AUTO: 0.65 THOUSANDS/ΜL (ref 0.6–4.47)
LYMPHOCYTES NFR BLD AUTO: 8 % (ref 14–44)
MCH RBC QN AUTO: 32.1 PG (ref 26.8–34.3)
MCHC RBC AUTO-ENTMCNC: 31 G/DL (ref 31.4–37.4)
MCV RBC AUTO: 103 FL (ref 82–98)
MONOCYTES # BLD AUTO: 0.67 THOUSAND/ΜL (ref 0.17–1.22)
MONOCYTES NFR BLD AUTO: 8 % (ref 4–12)
NEUTROPHILS # BLD AUTO: 6.82 THOUSANDS/ΜL (ref 1.85–7.62)
NEUTS SEG NFR BLD AUTO: 81 % (ref 43–75)
NRBC BLD AUTO-RTO: 0 /100 WBCS
PLATELET # BLD AUTO: 306 THOUSANDS/UL (ref 149–390)
PMV BLD AUTO: 10.4 FL (ref 8.9–12.7)
RBC # BLD AUTO: 2.65 MILLION/UL (ref 3.81–5.12)
WBC # BLD AUTO: 8.34 THOUSAND/UL (ref 4.31–10.16)

## 2022-06-20 PROCEDURE — 96417 CHEMO IV INFUS EACH ADDL SEQ: CPT

## 2022-06-20 PROCEDURE — 85025 COMPLETE CBC W/AUTO DIFF WBC: CPT

## 2022-06-20 PROCEDURE — 96413 CHEMO IV INFUSION 1 HR: CPT

## 2022-06-20 PROCEDURE — J9999Q0 INV ATEZOLIZUMAB 1200MG/20ML 20 ML: Performed by: PHYSICIAN ASSISTANT

## 2022-06-20 PROCEDURE — J9035Q0 INV BEVACIZUMAB 400MG/16ML 16 ML: Performed by: PHYSICIAN ASSISTANT

## 2022-06-20 RX ORDER — CIPROFLOXACIN 250 MG/1
250 TABLET, FILM COATED ORAL EVERY 12 HOURS SCHEDULED
Qty: 6 TABLET | Refills: 0 | Status: SHIPPED | OUTPATIENT
Start: 2022-06-20 | End: 2022-06-23

## 2022-06-20 RX ADMIN — SODIUM CHLORIDE 20 ML/HR: 9 INJECTION, SOLUTION INTRAVENOUS at 08:15

## 2022-06-20 RX ADMIN — Medication 1200 MG: at 09:19

## 2022-06-20 RX ADMIN — Medication 775 MG: at 10:07

## 2022-06-20 NOTE — PROGRESS NOTES
Patient is here for tecentriq and Avastin  Clinical trial cycle 27 day 1, for treatment of endomitrial cancer  Labs reviewed from 6/17no lab parameters required for treatment today  Port was accessed with good blood return noted  Patient resting with no complains  Will continue to monitor

## 2022-06-20 NOTE — TELEPHONE ENCOUNTER
Patient with recurrent/chronic UTI with nephrostomy tube in place now with s/s of acute UTI (dysuria)  Will add culture to specimen from Saturday  Will send short course of abx and review culture when available

## 2022-06-20 NOTE — PROGRESS NOTES
Patient tolerated infusion without complications  Port flushed and good blood return noted Patient aware of next appointment   Patient declined AVS

## 2022-06-20 NOTE — PROGRESS NOTES
Patient was seen in the infusion center for cycle 27  Patient was given Cycle 27 Rucaparib pills and drug diary, and patient returned Cycle 26 study pills, and diary  Patient denied any changes to conmeds since office visit prior to infusion  Patient did say that she feels symptomatic with a UTI   Dahiana Villagomez 6 reviewed Urinalysis and sent antibiotic to patients pharmacy  Geoffrey Rehman was told to call with any questions or concerns, Patient tolerates treatment without incident

## 2022-06-20 NOTE — PATIENT INSTRUCTIONS
Nutrition Rx & Recommendations:  Diet: High Calorie, High Protein (for high calorie foods see pages 52-53, and for high protein foods see pages 49-51 in your Eating Hints book)  Small, frequent meals/snacks may be easier to tolerate than 3 large daily meals  Aim for 5-6 small meals per day (every 2-3 hours)  Include protein at all meals/snacks  Follow proper oral care; Try baking soda/salt water rinse recipe (mix 3/4 tsp salt + 1 tsp baking soda + 1 qt water; rinse with plain water after using) in Eating Hints book (pg 18)  Brush your teeth before/after meals & before bed  For appetite loss: try powdered or liquid nutrition supplements; eating by the clock every 2-3 hours; set a timer to remind yourself to eat, keep snacks nearby; add extra protein & calories to your diet; drink liquids in between meals, choose liquids that add calories; eat a bedtime snack; eat soft, cool or frozen foods; eat a larger meal when you feel well & rested; only sip small amounts of liquids during meals (see pages 10-12 in your Eating Hints book)  For weight loss: monitor your weight at home at least 2x/week, record your weight, start by adding 250-500 extra calories per day, eat 5-6 small scheduled meals every 2-3 hrs, choose foods that are high in protein and calories (see pages 49-53 in your Eating Hints book), drink liquids with calories for example: milkshakes/smoothies/juice/soup/whole milk/chocolate milk, cook with protein fortified milk (see recipe on page 36 in your Eating Hints book), consider ready-to-drink oral nutrition supplements such as Ensure Plus, Ensure Enlive, Boost Plus, or Boost Very High Calorie, avoid "diet" and "light" foods when possible, avoid drinking too much with meals, contact your dietitian with any continued weight loss over the course of 1 week  For more info see pages 35-37 in your Eating Hints book  Weigh yourself regularly   If you notice weight loss, make an effort to increase your daily food/calorie intake  If you continue to notice loss after these efforts, reach out to your dietitian to establish a plan to stabilize weight     Snack ideas: [place] yogurt with fruit, pudding, veggies with humus OR try greek yogurt based vegetable dip, peanut butter, grapes and cheese, fruit with a cheese stick , cottage cheese, soup, unjury, protein shakes/smoothies  Continue to keep a food journal as long as it is helpful  Try to eat something every 1 hr, set alarm on your phone as a reminder    Patient choosen goals:  Resume food journal  Increase protein intake by adding protein to snacks  Scoop of pb, 1/2 Premier, cheese, protein wafers, eggs, [place] yogurt  Nutrition Supplements:    Continue Premier Protein Shake 1X/day  Add Unjury protein powder to foods as needed    Follow Up Plan: 7/11 during infusion   Recommend Referral to Other Providers: none at this time

## 2022-06-21 ENCOUNTER — TELEPHONE (OUTPATIENT)
Dept: UROLOGY | Facility: AMBULATORY SURGERY CENTER | Age: 67
End: 2022-06-21

## 2022-06-21 DIAGNOSIS — N39.0 RECURRENT UTI: Primary | ICD-10-CM

## 2022-06-21 DIAGNOSIS — R30.0 BURNING WITH URINATION: ICD-10-CM

## 2022-06-21 NOTE — TELEPHONE ENCOUNTER
Unfortunately, urine culture was not completed  If patient experiencing any symptoms of acute infection, would recommend patient go for repeat urine testing

## 2022-06-21 NOTE — TELEPHONE ENCOUNTER
Patient had urine culture ordered but lab didn't do the last culture that she had done was 5/3 and was susceptible   Please advise

## 2022-06-21 NOTE — TELEPHONE ENCOUNTER
----- Message from [employer] sent at 6/21/2022  3:27 PM EDT -----  Regarding: UTI  I had a urine test on Saturday  It read that I had a UTI  Digna Arita from the oncology office prescribed CIPRO for me but it doesnt seem to be working  The pain is getting worse and is almost constant  Can you prescribe something else for me? Obdulio Grief     [id number]

## 2022-06-22 RX ORDER — PHENAZOPYRIDINE HYDROCHLORIDE 200 MG/1
200 TABLET, FILM COATED ORAL DAILY PRN
Qty: 10 TABLET | Refills: 0 | Status: SHIPPED | OUTPATIENT
Start: 2022-06-22 | End: 2022-07-08 | Stop reason: SDUPTHER

## 2022-06-22 NOTE — TELEPHONE ENCOUNTER
Patient stated she is still experiencing pain when urinating and is requesting a call back to discuss

## 2022-06-22 NOTE — TELEPHONE ENCOUNTER
Agree with nurse's recommendations  Pyridium sent for management of dysuria  Patient should not take pyridium if providing a urine sample  She had previously been on myrbetriq, oxybutynin, and vaginal estrace, but medications discontinued due to side effects

## 2022-06-22 NOTE — TELEPHONE ENCOUNTER
Spoke to patient and she is agreeable to go for repeat urine testing  Patient states she is finishing Cipro that was prescribed by her doctor tonight  Advised she would need to be off the antibiotic for at least 3 days to be able to test her urine for an infection  Patient is understanding  Patient states she has a lot of burning when she urinates, when she feels she has to urinate and was asking of something would be able to prescribed until she is able to go for urine testing  Advised I will send a message to the provider to see if something could be prescribed in the meantime or what other alternatives can be done  Patient is agreeable  Advised we will call her again once we receive feedback

## 2022-06-27 ENCOUNTER — APPOINTMENT (OUTPATIENT)
Dept: LAB | Facility: CLINIC | Age: 67
End: 2022-06-27
Payer: COMMERCIAL

## 2022-06-27 DIAGNOSIS — N39.0 RECURRENT UTI: ICD-10-CM

## 2022-06-27 LAB

## 2022-06-27 PROCEDURE — 81001 URINALYSIS AUTO W/SCOPE: CPT

## 2022-06-27 PROCEDURE — 87186 SC STD MICRODIL/AGAR DIL: CPT | Performed by: NURSE PRACTITIONER

## 2022-06-27 PROCEDURE — 87077 CULTURE AEROBIC IDENTIFY: CPT | Performed by: NURSE PRACTITIONER

## 2022-06-27 PROCEDURE — 87086 URINE CULTURE/COLONY COUNT: CPT | Performed by: NURSE PRACTITIONER

## 2022-06-28 ENCOUNTER — TELEPHONE (OUTPATIENT)
Dept: UROLOGY | Facility: AMBULATORY SURGERY CENTER | Age: 67
End: 2022-06-28

## 2022-06-28 DIAGNOSIS — N39.0 URINARY TRACT INFECTION WITHOUT HEMATURIA, SITE UNSPECIFIED: Primary | ICD-10-CM

## 2022-06-28 NOTE — TELEPHONE ENCOUNTER
----- Message from [id number] Sheeba Avchase sent at 6/28/2022  1:02 PM EDT -----  Results of UA note micro hematuria, final culture is pending  Would recommend cystoscopy to further evaluate micro hematuria  Recent CT scan from April 2022 showed no significant findings in kidney or bladder

## 2022-06-28 NOTE — TELEPHONE ENCOUNTER
Spoke to patient to advise of AP's note  Patient wants to wait for the urine culture to be finalized until she can schedule the Cystoscopy  Advised we should be getting the UC results tomorrow and if not the next day  Patient is understanding

## 2022-06-29 LAB — BACTERIA UR CULT: ABNORMAL

## 2022-06-29 NOTE — TELEPHONE ENCOUNTER
Spoke with patient to answer her questions  She actually did not have any questions, and was looking to schedule cysto  Advised office will call her back when we have an availability  She verbalized understanding

## 2022-06-29 NOTE — TELEPHONE ENCOUNTER
Patient called stating she is to schedule a procedure (cysto) and she has questions about it and would like a call back       Patient can be reached at [phone]

## 2022-06-29 NOTE — TELEPHONE ENCOUNTER
Offered patient cysto appt with Dr Araseli Man 6/30/22 @ [address] [place] [address] office    Patient accepted appt and was thankful for phone call

## 2022-06-30 RX ORDER — LEVOFLOXACIN 500 MG/1
500 TABLET, FILM COATED ORAL EVERY 24 HOURS
Qty: 7 TABLET | Refills: 0 | Status: SHIPPED | OUTPATIENT
Start: 2022-06-30 | End: 2022-07-07

## 2022-06-30 NOTE — TELEPHONE ENCOUNTER
Spoke with patient and advised of AP's note  Patient understood and was thankful for the call  She will  her antibiotic and follow up 7/12 with Severiano Mendosa

## 2022-06-30 NOTE — TELEPHONE ENCOUNTER
Results of recent urine culture were positive for infection  Prescription for Levaquin was sent to her pharmacy based on final sensitivity  Reviewed with Dr Trevino Salts, as she was scheduled for cystoscopy  Given current urinary tract infection, cystoscopy will be cancelled  Cause of microhematuria is likely secondary to nephrostomy tube and UTI  He reviewed all recent images and history  Please call patient to cancel appointment  Plan to keep next appointment with Raul Logan on 7/12/22 as scheduled

## 2022-07-05 NOTE — TELEPHONE ENCOUNTER
Called Ragini back  And she states that she is uncomfortable and she is having burning even when she is not going to the bathroom  She completed 6 days of the Levoflocin    She states she is hydrating - please advise

## 2022-07-05 NOTE — PROGRESS NOTES
Outpatient Oncology Nutrition Consult  Type of Consult: Follow Up  Care Location: [facility]   Nutrition Assessment:  Oncology Diagnosis & Treatments:   Endometrial cancer  S/p surgery 12/19/17  Recurrence 7/8/19  S/p chemotherapy (carboplatin/taxol from 7/30/19-1/6/20)  S/p whole pelvis RT (2/4/20- 4/13/20)  Disease progression  ENDOBAR trial started 12/21/20    Oncology History   Endometrial cancer ([facility] [address] )   11/17/2017 Initial Diagnosis    Endometrial cancer ([facility] [address] )     11/17/2017 Biopsy    ENDOMETRIAL BIOPSY: WELL DIFFERENTIATED ENDOMETRIAL ADENOCARCINOMA (FIGO I) WITH FOCALMUCINOUS FEATURES  Part B: ENDOCERVICAL POLYP:BENIGN ENDOCERVICAL      12/19/2017 Surgery    Robotic assisted total laparoscopic hysterectomy with bilateral salpingo-oophorectomy and sentinel bilateral pelvic lymph node dissection  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 (4 4 x 3 2 cm tumor, 9 4/15 4mm invasion, NO LVSI, washings revealed atypical cellular changes)     12/19/2017 Genetic Testing    Morrison testing negative     6/28/2019 Biopsy    A  Breast, Right, US BX Right Breast 1000 4 cmfn:  - Benign breast tissue with focal histiocytic aggregate  See comment   - Negative for atypia and in-situ or invasive carcinoma  7/8/2019 Recurrence    Presented with right lower extremity DVT and CT demonstrating right pelvic sidewall mass with venous, ureteral and nerve compression causing significant neuropathic pain  Biopsy:  Lymph Node, Right pelvic lymph node x3:  - High-grade adenocarcinoma  7/30/2019 - 1/6/2020 Chemotherapy    Taxol 175 mg/m2 and carboplatin AUC 6 every 21 days  Dose was reduced to taxol 135 mg/m2 and carboplatin AUC 5  Completed 6 cycles  Treatment protracted due to multiple hospital admissions       2/24/2020 - 4/13/2020 Radiation    adjuvant external beam radiation therapy to the whole pelvis to 4500 cGy followed by boost to gross disease of an additional 2200 cGy     11/23/2020 Progression    New necrotic adenopathy in the retroperitoneum on CT      12/21/2020 -  Chemotherapy    Began chemotherapy with rucaparib, atezolizumab, and bevacizumab as per [facility] clinical trial        PMH: RNYGB (2001 at [address])    Past Medical History:   Diagnosis Date    Anemia     Chemotherapy follow-up examination     Depression     Endometrial cancer ([address] ) 12/2017    Hyperglycemia     vx type 2 dm -- last assessed 4/1/14; resolved 11/7/17    Hyperlipidemia     Hypertension     Obesity     last assessed 10/14/17; resolved 11/7/17     Past Surgical History:   Procedure Laterality Date    ABDOMINAL SURGERY      GASTRIC BYPASS    CHOLECYSTECTOMY      at the time of gastric bypass    COLONOSCOPY      CT NEEDLE BIOPSY LYMPH NODE  7/8/2019    FL GUIDED CENTRAL VENOUS ACCESS DEVICE INSERTION  11/12/2019    GASTRIC BYPASS      HYSTERECTOMY Bilateral     total abdominal with salpingo-oophorectomy    IR NEPHROSTOMY TO NEPHROURETERAL STENT  6/9/2022    IR NEPHROSTOMY TUBE CHECK/CHANGE/REPOSITION/REINSERTION/UPSIZE  5/27/2022    IR NEPHROSTOMY TUBE PLACEMENT  7/26/2019    IR NEPHROURETERAL STENT CHECK/CHANGE/REPOSITION  4/6/2021    IR NEPHROURETERAL STENT CHECK/CHANGE/REPOSITION  6/4/2021    IR NEPHROURETERAL STENT CHECK/CHANGE/REPOSITION  9/3/2021    IR NEPHROURETERAL STENT CHECK/CHANGE/REPOSITION  12/7/2021    IR NEPHROURETERAL STENT CHECK/CHANGE/REPOSITION  3/8/2022    IR NEPHROURETERAL STENT CHECK/CHANGE/REPOSITION  5/10/2022    IR PICC LINE  9/27/2019    IR PORT PLACEMENT  7/26/2019    IR PORT REMOVAL  9/20/2019    OOPHORECTOMY Bilateral     WI INSJ TUNNELED CTR VAD W/SUBQ PORT AGE 5 YR/> Left 11/12/2019    Procedure: INSERTION VENOUS PORT ( PORT-A-CATH) IR;  Surgeon: Cyndi Dove DO;  [facility]: [facility];  Service: Interventional Radiology    WI LAP, RADICAL HYST W/ TUBE&OV, NODE BX N/A 12/19/2017    Procedure: HYSTERECTOMY LAPAROSCOPIC TOTAL ([address]) W/ ROBOTICS; BILATERAL SALPINGOOPHERECTOMY; LYMPH NODE DISSECTION; lysis of adhesions;  Surgeon: Virgil Dias MD;  Location: [facility];  Service: Gynecology Oncology    NJ LAP,DIAGNOSTIC ABDOMEN N/A 12/19/2017    Procedure: LAPAROSCOPY DIAGNOSTIC;  Surgeon: Virgil Dias MD;  [facility]: [facility];  Service: Gynecology Oncology    TONSILLECTOMY      US GUIDED BREAST BIOPSY RIGHT COMPLETE Right 6/28/2019       Review of Medications:   Vitamins, Supplements and Herbals: yes: Hx RNYGB regimen: Vitamin D, Folic Acid, Align, calcium citrate TID, B12 1000 mcg QD, MVI BID (MVI does not have iron);  Iron 65 mg QD (2 hrs seperate from calcium)    Current Outpatient Medications:     acetaminophen-codeine (TYLENOL #3) 300-30 mg per tablet, Take 1 tablet by mouth every 6 (six) hours as needed for moderate pain, Disp: 20 tablet, Rfl: 0    ARIPiprazole (ABILIFY) 20 MG tablet, Take 20 mg by mouth in the morning , Disp: , Rfl:     aspirin (ECOTRIN LOW STRENGTH) 81 mg EC tablet, Take 81 mg by mouth daily, Disp: , Rfl:     Calcium-Magnesium-Vitamin D (CALCIUM 500 PO), Take by mouth daily , Disp: , Rfl:     cholecalciferol (VITAMIN D3) 1,000 units tablet, Take 1,000 Units by mouth daily, Disp: , Rfl:     clotrimazole-betamethasone (LOTRISONE) 1-0 05 % cream, Apply topically 2 (two) times a day (Patient not taking: No sig reported), Disp: 30 g, Rfl: 0    Cyanocobalamin (VITAMIN B12 PO), Take by mouth daily , Disp: , Rfl:     dronabinol (MARINOL) 2 5 mg capsule, Take 1 capsule (2 5 mg total) by mouth 2 (two) times a day before meals (Patient taking differently: Take 2 5 mg by mouth 2 (two) times a day before meals As needed), Disp: 60 capsule, Rfl: 0    enoxaparin (LOVENOX) 80 mg/0 8 mL, Inject 0 8 mL (80 mg total) under the skin every 24 hours, Disp: 30 mL, Rfl: 5    folic acid (FOLVITE) 1 mg tablet, Take 1 tablet (1 mg total) by mouth daily, Disp: , Rfl: 0    gemfibrozil (LOPID) 600 mg tablet, TAKE 1 TABLET BY MOUTH EVERY DAY, Disp: 90 tablet, Rfl: 0    lactulose (CHRONULAC) 10 g/15 mL solution, TAKE 45 ML (30 G TOTAL) BY MOUTH 2 (TWO) TIMES A DAY as needed, Disp: 300 mL, Rfl: 2    LORazepam (ATIVAN) 0 5 mg tablet, Take 1 tablet (0 5 mg total) by mouth every 6 (six) hours as needed for anxiety (or nausea), Disp: 36 tablet, Rfl: 1    Multiple Vitamin (MULTIVITAMIN) tablet, Take 1 tablet by mouth daily, Disp: , Rfl:     naloxone (NARCAN) 4 mg/0 1 mL nasal spray, Administer 1 spray into a nostril  If no response after 2-3 minutes, give another dose in the other nostril using a new spray  (Patient not taking: No sig reported), Disp: 1 each, Rfl: 0    omeprazole (PriLOSEC) 20 mg delayed release capsule, Take 20 mg by mouth daily, Disp: , Rfl:     ondansetron (ZOFRAN) 8 mg tablet, Take 1 tablet (8 mg total) by mouth every 8 (eight) hours as needed for nausea or vomiting (Patient not taking: No sig reported), Disp: 30 tablet, Rfl: 1    oxybutynin (DITROPAN XL) 15 MG 24 hr tablet, TAKE 1 TABLET BY MOUTH DAILY AT BEDTIME, Disp: 90 tablet, Rfl: 3    oxyCODONE-acetaminophen (Percocet) 5-325 mg per tablet, Take 1 tablet by mouth every 4 (four) hours as needed for moderate pain Max Daily Amount: 6 tablets, Disp: 30 tablet, Rfl: 0    phenazopyridine (PYRIDIUM) 200 mg tablet, Take 1 tablet (200 mg total) by mouth daily as needed for bladder spasms, Disp: 10 tablet, Rfl: 0    rucaparib (RUBRACA) 300 mg tablet, Take 600 mg by mouth every 12 (twelve) hours Take with or without food    Do not repeat a vomited dose , Disp: , Rfl:     saccharomyces boulardii (FLORASTOR) 250 mg capsule, Take 1 capsule (250 mg total) by mouth 2 (two) times a day, Disp: , Rfl: 0    senna (SENOKOT) 8 6 MG tablet, Take 1 tablet (8 6 mg total) by mouth 2 (two) times a day as needed for constipation, Disp: 60 tablet, Rfl: 0    venlafaxine (EFFEXOR) 75 mg tablet, Take 75 mg by mouth 3 (three) times a day  , Disp: , Rfl:   No current facility-administered medications for this visit      Facility-Administered Medications Ordered in Other Visits:     INV atezolizumab (INVESTIGATIONAL) 1,200 mg in sodium chloride 0 9 % 250 mL infusion, 1,200 mg, Intravenous, Once, BRITANY Rojsa bevacizumab (INVESTIGATIONAL) 775 mg in sodium chloride 0 9 % 69 mL IVPB, 775 mg, Intravenous, Once, Luli Busby PA-C    sodium chloride 0 9 % infusion, 20 mL/hr, Intravenous, Continuous, Susi Lawson PA-C    Most Recent Lab Results:  Lab Results   Component Value Date    WBC 7 36 07/08/2022    IRON 92 11/19/2021    TIBC 374 11/19/2021    FERRITIN 1,429 (H) 11/19/2021    CHOLESTEROL 177 07/08/2022    CHOL 183 10/27/2017    TRIG 87 04/15/2022    HDL 72 04/15/2022    LDLCALC 100 04/15/2022    ALT 16 07/08/2022    AST 13 07/08/2022    ALB 3 4 (L) 07/08/2022     10/27/2017     05/12/2017    SODIUM 138 07/08/2022    SODIUM 137 06/17/2022    K 3 7 07/08/2022    K 3 8 06/17/2022     07/08/2022    BUN 19 07/08/2022    BUN 13 06/17/2022    CREATININE 0 97 07/08/2022    CREATININE 0 94 06/17/2022    EGFR 61 07/08/2022    PHOS 3 7 07/08/2022    PHOS 3 4 06/17/2022    GLUCOSE 219 (H) 12/19/2017    POCGLU 97 08/18/2020    GLUF 100 (H) 07/08/2022    GLUF 95 05/06/2022    GLUC 100 07/08/2022    HGBA1C 4 8 07/07/2021    HGBA1C 5 1 02/03/2020    HGBA1C 6 2 08/28/2019    CALCIUM 9 4 07/08/2022    FOLATE 5 4 10/06/2019    MG 2 0 07/08/2022     Anthropometric Measurements:   Height: 59"  Ht Readings from Last 1 Encounters:   07/11/22 4' 11" (1 499 m)     Wt Readings from Last 20 Encounters:   07/11/22 54 kg (119 lb)   07/08/22 53 5 kg (118 lb)   06/20/22 51 7 kg (114 lb)   06/17/22 51 7 kg (114 lb)   06/09/22 53 5 kg (118 lb)   06/07/22 53 5 kg (118 lb)   05/31/22 53 9 kg (118 lb 13 3 oz)   05/27/22 53 5 kg (117 lb 15 1 oz)   05/27/22 53 5 kg (118 lb)   05/18/22 54 9 kg (121 lb)   05/10/22 55 8 kg (123 lb)   05/09/22 56 kg (123 lb 8 oz)   05/06/22 55 1 kg (121 lb 8 oz) 04/18/22 55 3 kg (122 lb)   04/15/22 55 3 kg (122 lb)   04/14/22 55 3 kg (122 lb)   03/28/22 54 kg (119 lb)   03/25/22 54 6 kg (120 lb 5 9 oz)   03/18/22 56 7 kg (125 lb)   03/16/22 56 7 kg (125 lb)     Weight Hx:  Usual Weight: 270# before RNYGB in 2001; lowest post-op wt: 200#; wt typically fluctuates between 215-230# (prior to wt loss)  Varian: (2/25/20) 185 6#, (3/3/20) 188 6#, (3/10/20) 187 2#, (3/19/20) 186 2#, (3/24/20) 187#, (3/31/20) 185 4#, (4/10/20) 184 4#    Home Scale Wts: (2/19/20) 185#, (8/3/20) 194#    Oncology Nutrition-Anthropometrics    Flowsheet Row Nutrition from 7/11/2022 in [facility] Nutrition from 6/20/2022 in [facility]   Patient [age] (years): 61 years 61 years   Patient (female) height (in): 61 in 61 in   Current Weight to be used for anthropometric calculations (lbs) 119 lbs 114 lbs   Current Weight to be used for anthropometric calculations (kg) 54 1 kg 51 8 kg   BMI: 24 23   IBW female: 95 lbs 95 lbs   IBW (kg) female: 43 2 kg 43 2 kg   IBW % (female) 125 3 % 120 %   Adjusted BW (female): 101 lbs 99 8 lbs   Adjusted BW kg (female): 45 9 kg 45 4 kg   % weight change after 1 month: 0 8 % -5 8 %   Weight change after 1 month (lbs) 1 lbs -7 lbs   % weight change after 3 months: -2 5 % -8 8 %   Weight change after 3 months (lbs) -3 lbs -11 lbs   % weight change after 6 months: -10 5 % --   Weight change after 6 months (lbs) -14 lbs --   % weight change after 1 year: -27 % --         Nutrition-Focused Physical Findings:  severe muscle depletion ([place]) - hollowing/scooping/depression and severe body fat depletion (Orbital) - hollowing/depression/dark circles    Food/Nutrition-Related History & Client/Social History:    Current Nutrition Impact Symptoms:   [] Nausea  [] Reduced Appetite - much better [] Acid Reflux   [] Vomiting  [x] Unintended Wt Loss -   -significant x6 mo + 1 year [x] Malabsorption - S/p RNYGB in 2001   [] Diarrhea  [] Unintended Wt Gain  [] Dumping Syndrome   [] Constipation - denies  -On bowel protocol [] Thick Mucous/Secretions  [] Abdominal Pain    [] Dysgeusia (Altered Taste) [x] Xerostomia (Dry Mouth) - comes and goes  -uses bs/sw rinses and Biotene [] Gas    [] Dysosmia (Altered Smell)  [] Thrush  [] Difficulty Chewing    [x] Oral Mucositis (Sore Mouth) - some chewing on L cheek during sleep (needs dental work); mouth sores come/go; sometimes hurts to eat   -using biotene products + bs/sw rinses [x] Fatigue - more than usual d/t low Hgb per pt [] Pain:   [] Odynophagia   [] Esophagitis  [] Other:    [] Dysphagia [] Early Satiety  [] No Problems Eating      Food Allergies & Intolerances: yes: had skin patch testing which showed allergy to strawberries, but says she is able to eat strawberries without any side effects    Current Diet: Small Frequent Meals  Current Nutrition Intake: Increased since last visit   Appetite: Good   Nutrition Route: PO   Oral Care: Biotene toothpaste, rinse and spray, bs/sw rinses  Activity level: Sedentary  Limited by pain and medical condition       Usual Day Diet Recall: has not restarted food journal  Breakfast: eating later d/t eating during the night: hot dog w/o roll; wants to start Premier again  Snack: strawberries with whipped topping  Lunch: beef and beans chili  Snack: ice cream  Dinner: ordered out: 1/2 cup rigatoni with mushrooms  Snack: has been eating in the middle of the night: chocolate ice cream, strawberries with whipped topping, hot dog    Beverages: water (16 9 oz x2-3), iced tea (32 oz x1, occasionally more), Fairlife milk (occasionally with cereal)   -Does not drink coffee or alcohol   -Does not separate fluids from solids, does not have discomfort while eating and drinking at the same time  Supplements:   Premier Protein Shake (11 oz, 160 kcal, 30 g pro) - none lately   Unjury protein powder - occasionally adds to foods such as soup    Oncology Nutrition-Estimated Needs    Flowsheet Row Nutrition from 3/28/2022 in [place] [address] [facility] Nutrition from 10/11/2021 in [place] [address] [facility]   Weight type used Actual weight Adjusted weight   Weight in kilograms (kg) used for estimated needs 54 1 kg 49 5 kg   Energy needs formula:  35-40 kcal/kg 35-40 kcal/kg   Energy needs based on 35 kcal/k kcal 1733 kcal   Energy needs based on 40 kcal/k kcal 1980 kcal   Protein needs formula: 1 5-2 g/kg 1 5-2 g/kg   Protein needs based on 1 5 g/kg 81 g 74 g   Protein needs based on 2 g/kg 108 g 99 g   Fluid needs formula: 30-35 mL/kg 30-35 mL/kg   Fluid needs based on 30 mL/kg 1623 mL 1485 mL   Fluid needs in ounces 55 oz 50 oz   Fluid needs based on 35 mL/kg 1894 mL 1733 mL   Fluid needs in ounces 64 oz 59 oz        Discussion & Intervention:    Ragini was evaluated today for an RD follow up regarding wt loss and pt request for dietitian involvement throughout tx  Ragini is currently undergoing tx for endometrial cancer  Ragini is doing well today  She has regained some weight since last visit  Her appetite has been good  She says she has been eating during the night lately  Denies N/V/D/C  She is not finding the need to food journal   Xerostomia and sore mouth continue to be an issue but she is managing her sx the best she can  Reviewed 24 hour recall, which revealed an adequate po intake, and discussed ways to increase kcal, protein, and fluid intakes and optimize nutrient intake  Also reviewed the importance of wt management throughout the tx process and the role of a high kcal/ high protein diet in managing wt and overall health    Based on today's assessment, discussion included: MNT for: xerostomia, bowel management, sore mouth, hx wt loss, a high kcal/protein diet & food choices to include at all meals & snacks (Examples of high kcal foods: cheese, full-fat dairy products, nut butter, plant-based fats, coconut oil/milk, avocado, butter, cream soup, etc  Examples of high protein foods: eggs, chicken, fish, beans/legumes, nuts/nut butters, bone broth, etc ) , adequate hydration & fluid choices, utilizing oral nutrition supplements and recipe suggestions/resources, trying new recipes, & cooking at home more often  Moving forward, Ragini was encouraged to increase kcal, protein, and fluid intakes  She does best with self-directed goals and has chosen goals for herself moving forward; RD guidance provided during goal setting  Materials Provided: not applicable  All questions and concerns addressed during todays visit  Ragini has RD contact information  Nutrition Diagnosis:  Increased Nutrient Needs (kcal & pro) related to increased demand for nutrients and disease state as evidenced by cancer dx and pt undergoing tx for cancer  Patient has clinical indicators (or ASPEN criteria) consistent with severe protein-calorie malnutrition in the context of Chronic Illness as evidenced by severe muscle depletion ([place]) - hollowing/scooping/depression and severe body fat depletion (Orbital) - hollowing/depression/dark circles  Monitoring & Evaluation:   Goals:  weight maintenance/stabilization  adequate nutrition impact symptom management  pt to meet >/=75% estimated nutrition needs daily     Progress Towards Goals: Progressing and Partially Met    Nutrition Rx & Recommendations:  Diet: High Calorie, High Protein (for high calorie foods see pages 52-53, and for high protein foods see pages 49-51 in your Eating Hints book)  Small, frequent meals/snacks may be easier to tolerate than 3 large daily meals  Aim for 5-6 small meals per day (every 2-3 hours)  Include protein at all meals/snacks    Follow proper oral care; Try baking soda/salt water rinse recipe (mix 3/4 tsp salt + 1 tsp baking soda + 1 qt water; rinse with plain water after using) in Eating Hints book (pg 18)  Brush your teeth before/after meals & before bed  For sore mouth/throat: choose foods that are easy to chew/swallow; cook foods until they are soft/tender; moisten & soften foods (gravy/sauce/broth/yogurt); cut food into small pieces; drink with a straw (as tolerated); eat with a very small spoon; eat cold or room temperature food; suck on ice chips; Avoid citrus, spicy foods, tomatoes/ketchup, salty foods, raw vegetables, sharp/crunchy/hard foods & alcohol (see pages 23-28 in your Eating Hints book)  For dry mouth: sip water throughout the day; try very sweet (or tart, as tolerated) drinks; chew gum or suck on hard candy, popsicles, & ice chips; eat easy to swallow foods (such as pureed cooked foods or soups); moisten food with sauce/gravy/salad dressing; do not drink beer, wine, or any type of alcohol; keep lips moist with lip balm; avoid tobacco products & second-hand smoke; try Biotene as needed (see pages 17-18 in your Eating Hints book)  Follow proper oral care; Try baking soda/salt water rinse recipe (mix 3/4 tsp salt + 1 tsp baking soda + 1 qt water; rinse with pain water after using) in Eating Hints book (pg 18)  Brush your teeth before/after meals & before bed  Weigh yourself regularly  If you notice weight loss, make an effort to increase your daily food/calorie intake  If you continue to notice loss after these efforts, reach out to your dietitian to establish a plan to stabilize weight  Snack ideas: [place] yogurt with fruit, pudding, veggies with humus OR try greek yogurt based vegetable dip, peanut butter, grapes and cheese, fruit with a cheese stick , cottage cheese, soup, unjury, protein shakes/smoothies  Continue to keep a food journal as long as it is helpful  Try to eat something every 1 hr, set alarm on your phone as a reminder    Patient choosen goals:   Increase vegetable intake  Make sure you have 1 serving with lunch and dinner  Examples: broccoli, asparagus, carrots, celery, corn, zucchini  Try making bean salad: black beans, corn, tomato, peppers, red onion, cilantro, balsamic  Nutrition Supplements:    Re-start Premier Protein Shake 1X/day in the morning    Follow Up Plan: 8/1 during infusion   Recommend Referral to Other Providers: none at this time

## 2022-07-06 ENCOUNTER — APPOINTMENT (OUTPATIENT)
Dept: LAB | Facility: CLINIC | Age: 67
End: 2022-07-06
Payer: COMMERCIAL

## 2022-07-06 DIAGNOSIS — C54.1 ENDOMETRIAL CANCER (HCC): Primary | ICD-10-CM

## 2022-07-06 DIAGNOSIS — N39.0 URINARY TRACT INFECTION WITHOUT HEMATURIA, SITE UNSPECIFIED: ICD-10-CM

## 2022-07-06 PROCEDURE — 87086 URINE CULTURE/COLONY COUNT: CPT

## 2022-07-06 NOTE — TELEPHONE ENCOUNTER
Left message per communication form advising of AP's note  Urine testing orders have been placed  Advised she can go to any [facility] lab to provide a urine sample  Office number provided with any further questions or concerns

## 2022-07-06 NOTE — TELEPHONE ENCOUNTER
Patient may be experiencing symptoms more consistent with vaginal atrophy, which can be further discussed at her upcoming appointment  In the interim, would recommend patient go for repeat urine culture to ensure infection has resolved, and she may take pyridium as needed after providing specimen

## 2022-07-07 ENCOUNTER — APPOINTMENT (OUTPATIENT)
Dept: LAB | Facility: CLINIC | Age: 67
End: 2022-07-07
Payer: COMMERCIAL

## 2022-07-07 LAB
AMORPH URATE CRY URNS QL MICRO: ABNORMAL
BACTERIA UR QL AUTO: ABNORMAL /HPF
BILIRUB UR QL STRIP: NEGATIVE
CLARITY UR: ABNORMAL
COLOR UR: YELLOW
GLUCOSE UR STRIP-MCNC: NEGATIVE MG/DL
HGB UR QL STRIP.AUTO: ABNORMAL
KETONES UR STRIP-MCNC: NEGATIVE MG/DL
LEUKOCYTE ESTERASE UR QL STRIP: ABNORMAL
MUCOUS THREADS UR QL AUTO: ABNORMAL
NITRITE UR QL STRIP: NEGATIVE
NON-SQ EPI CELLS URNS QL MICRO: ABNORMAL /HPF
PH UR STRIP.AUTO: 6 [PH]
PROT UR STRIP-MCNC: ABNORMAL MG/DL
RBC #/AREA URNS AUTO: ABNORMAL /HPF
SP GR UR STRIP.AUTO: 1.01 (ref 1–1.03)
UROBILINOGEN UR STRIP-ACNC: <2 MG/DL
WBC #/AREA URNS AUTO: ABNORMAL /HPF

## 2022-07-07 PROCEDURE — 81001 URINALYSIS AUTO W/SCOPE: CPT

## 2022-07-08 ENCOUNTER — DOCUMENTATION (OUTPATIENT)
Dept: OTHER | Facility: HOSPITAL | Age: 67
End: 2022-07-08

## 2022-07-08 ENCOUNTER — HOSPITAL ENCOUNTER (OUTPATIENT)
Dept: INFUSION CENTER | Facility: HOSPITAL | Age: 67
Discharge: HOME | End: 2022-07-08
Payer: COMMERCIAL

## 2022-07-08 ENCOUNTER — OFFICE VISIT (OUTPATIENT)
Dept: GYNECOLOGIC ONCOLOGY | Facility: CLINIC | Age: 67
End: 2022-07-08
Payer: MEDICARE

## 2022-07-08 VITALS
WEIGHT: 118 LBS | BODY MASS INDEX: 23.79 KG/M2 | DIASTOLIC BLOOD PRESSURE: 70 MMHG | SYSTOLIC BLOOD PRESSURE: 122 MMHG | TEMPERATURE: 97.3 F | HEIGHT: 59 IN | RESPIRATION RATE: 16 BRPM | OXYGEN SATURATION: 99 % | HEART RATE: 71 BPM

## 2022-07-08 DIAGNOSIS — C54.1 ENDOMETRIAL CANCER (HCC): Primary | Chronic | ICD-10-CM

## 2022-07-08 DIAGNOSIS — D64.89 DRUG-INDUCED ANEMIA: ICD-10-CM

## 2022-07-08 DIAGNOSIS — C54.1 ENDOMETRIAL CANCER (HCC): ICD-10-CM

## 2022-07-08 DIAGNOSIS — R30.0 BURNING WITH URINATION: ICD-10-CM

## 2022-07-08 LAB
ALBUMIN SERPL BCP-MCNC: 3.4 G/DL (ref 3.5–5)
ALP SERPL-CCNC: 124 U/L (ref 46–116)
ALT SERPL W P-5'-P-CCNC: 16 U/L (ref 12–78)
AMYLASE SERPL-CCNC: 35 IU/L (ref 25–115)
ANION GAP SERPL CALCULATED.3IONS-SCNC: 8 MMOL/L (ref 4–13)
APTT PPP: 55 SECONDS (ref 23–37)
AST SERPL W P-5'-P-CCNC: 13 U/L (ref 5–45)
BACTERIA UR CULT: NORMAL
BASOPHILS # BLD AUTO: 0.06 THOUSANDS/ΜL (ref 0–0.1)
BASOPHILS NFR BLD AUTO: 1 % (ref 0–1)
BILIRUB SERPL-MCNC: 0.44 MG/DL (ref 0.2–1)
BUN SERPL-MCNC: 19 MG/DL (ref 5–25)
CALCIUM ALBUM COR SERPL-MCNC: 9.9 MG/DL (ref 8.3–10.1)
CALCIUM SERPL-MCNC: 9.4 MG/DL (ref 8.3–10.1)
CHLORIDE SERPL-SCNC: 107 MMOL/L (ref 100–108)
CHOLEST SERPL-MCNC: 177 MG/DL
CO2 SERPL-SCNC: 23 MMOL/L (ref 21–32)
CREAT SERPL-MCNC: 0.97 MG/DL (ref 0.6–1.3)
EOSINOPHIL # BLD AUTO: 0.12 THOUSAND/ΜL (ref 0–0.61)
EOSINOPHIL NFR BLD AUTO: 2 % (ref 0–6)
ERYTHROCYTE [DISTWIDTH] IN BLOOD BY AUTOMATED COUNT: 14.9 % (ref 11.6–15.1)
GFR SERPL CREATININE-BSD FRML MDRD: 61 ML/MIN/1.73SQ M
GGT SERPL-CCNC: 9 U/L (ref 5–85)
GLUCOSE P FAST SERPL-MCNC: 100 MG/DL (ref 65–99)
GLUCOSE SERPL-MCNC: 100 MG/DL (ref 65–140)
HCT VFR BLD AUTO: 28.1 % (ref 34.8–46.1)
HGB BLD-MCNC: 8.7 G/DL (ref 11.5–15.4)
IMM GRANULOCYTES # BLD AUTO: 0.14 THOUSAND/UL (ref 0–0.2)
IMM GRANULOCYTES NFR BLD AUTO: 2 % (ref 0–2)
INR PPP: 1.04 (ref 0.84–1.19)
LIPASE SERPL-CCNC: 90 U/L (ref 73–393)
LYMPHOCYTES # BLD AUTO: 0.88 THOUSANDS/ΜL (ref 0.6–4.47)
LYMPHOCYTES NFR BLD AUTO: 12 % (ref 14–44)
MAGNESIUM SERPL-MCNC: 2 MG/DL (ref 1.6–2.6)
MCH RBC QN AUTO: 32.1 PG (ref 26.8–34.3)
MCHC RBC AUTO-ENTMCNC: 31 G/DL (ref 31.4–37.4)
MCV RBC AUTO: 104 FL (ref 82–98)
MONOCYTES # BLD AUTO: 0.74 THOUSAND/ΜL (ref 0.17–1.22)
MONOCYTES NFR BLD AUTO: 10 % (ref 4–12)
NEUTROPHILS # BLD AUTO: 5.42 THOUSANDS/ΜL (ref 1.85–7.62)
NEUTS SEG NFR BLD AUTO: 73 % (ref 43–75)
NRBC BLD AUTO-RTO: 0 /100 WBCS
PHOSPHATE SERPL-MCNC: 3.7 MG/DL (ref 2.3–4.1)
PLATELET # BLD AUTO: 299 THOUSANDS/UL (ref 149–390)
PMV BLD AUTO: 9.7 FL (ref 8.9–12.7)
POTASSIUM SERPL-SCNC: 3.7 MMOL/L (ref 3.5–5.3)
PROT SERPL-MCNC: 7.3 G/DL (ref 6.4–8.2)
PROTHROMBIN TIME: 13.2 SECONDS (ref 11.6–14.5)
RBC # BLD AUTO: 2.71 MILLION/UL (ref 3.81–5.12)
SODIUM SERPL-SCNC: 138 MMOL/L (ref 136–145)
T3 SERPL-MCNC: 1.1 NG/ML (ref 0.6–1.8)
T4 FREE SERPL-MCNC: 1.03 NG/DL (ref 0.76–1.46)
TSH SERPL DL<=0.05 MIU/L-ACNC: 1.47 UIU/ML (ref 0.45–4.5)
WBC # BLD AUTO: 7.36 THOUSAND/UL (ref 4.31–10.16)

## 2022-07-08 PROCEDURE — 80053 COMPREHEN METABOLIC PANEL: CPT

## 2022-07-08 PROCEDURE — 82465 ASSAY BLD/SERUM CHOLESTEROL: CPT

## 2022-07-08 PROCEDURE — 83735 ASSAY OF MAGNESIUM: CPT

## 2022-07-08 PROCEDURE — 82150 ASSAY OF AMYLASE: CPT

## 2022-07-08 PROCEDURE — 85610 PROTHROMBIN TIME: CPT

## 2022-07-08 PROCEDURE — 84439 ASSAY OF FREE THYROXINE: CPT

## 2022-07-08 PROCEDURE — 85025 COMPLETE CBC W/AUTO DIFF WBC: CPT

## 2022-07-08 PROCEDURE — 84480 ASSAY TRIIODOTHYRONINE (T3): CPT

## 2022-07-08 PROCEDURE — 82977 ASSAY OF GGT: CPT

## 2022-07-08 PROCEDURE — 83690 ASSAY OF LIPASE: CPT

## 2022-07-08 PROCEDURE — 84100 ASSAY OF PHOSPHORUS: CPT

## 2022-07-08 PROCEDURE — 84443 ASSAY THYROID STIM HORMONE: CPT

## 2022-07-08 PROCEDURE — 85730 THROMBOPLASTIN TIME PARTIAL: CPT

## 2022-07-08 PROCEDURE — 99215 OFFICE O/P EST HI 40 MIN: CPT | Performed by: PHYSICIAN ASSISTANT

## 2022-07-08 RX ORDER — SODIUM CHLORIDE 9 MG/ML
20 INJECTION, SOLUTION INTRAVENOUS CONTINUOUS
Status: DISCONTINUED | OUTPATIENT
Start: 2022-07-11 | End: 2022-07-14 | Stop reason: HOSPADM

## 2022-07-08 RX ORDER — PHENAZOPYRIDINE HYDROCHLORIDE 200 MG/1
200 TABLET, FILM COATED ORAL DAILY PRN
Qty: 10 TABLET | Refills: 0 | Status: SHIPPED | OUTPATIENT
Start: 2022-07-08 | End: 2022-08-23

## 2022-07-08 NOTE — PROGRESS NOTES
Labs have been reviewed and signed Silva Busby   on 7/08/2022  Per protocol, patient is ok to proceed with Cycle 28 Day 1 treatment on [facility] 7/11/22  Patients Hgb was 8 5 , Winfield Lipps will hold patient Oral Rucaparib only for one cycle  Ok to proceed with infusion

## 2022-07-08 NOTE — ASSESSMENT & PLAN NOTE
Persistent anemia 8 5-8 7 g/dL  Previously stable 9-10 g/dL (May 2022)  Patient has worsening fatigue  Per protocol, will hold rucaparib for 1 cycle, and evaluate if fatigue improves and hemoglobin rebounds

## 2022-07-08 NOTE — ASSESSMENT & PLAN NOTE
Recurrent stage IB endometrial cancer currently on ENDOBARR clinical trial presents  She has received 27 cycles  She has treatment-related fatigue which has worsened, and drug-related anemia       Labs from 7/8/22 reviewed  Proceed with next cycle of treatment as scheduled   Plan to hold rucaparib x 1 cycle given anemia with associated worsening fatigue      CT imaging per protocol scheduled August       Return to the office per protocol

## 2022-07-08 NOTE — PROGRESS NOTES
Patient was seen in the office on 7/8/2022 for her Cycle 28 Day 1 pretreatment office visit for EndoBarr with BHAVYA Michael  Patient states that she is following with urology for a UTI  She just finished a course of antibiotic and is awaiting the results to her urine culture sent out yesterday  Patient states that her appetite has been good and she has been eating more  She denies mouth sores at this time and states that her constipation has resolved and is having normal bowel movements  Patient states that she is still experiencing mild blood in her urine post her nephrostomy tube change  Urology is aware and is monitoring her  Vern Ruedaa PAC reviewed her labs and her HgB is at 8 5 which is holding consistent from last cycle  Patient does feel slightly tired  Vern Amaro will hold patients Rucaparib for one cycle to see if this helps in the rebound of her HgB  Patient verbalized understanding and is in agreement with this plan  Patient is aware that she will still receive her infusion for this cycle but will not take her oral pill for this cycle only  Labs will be recheck prior to cycle 29  All AEs and Conmeds were reviewed   Future appointments were reviewed  Overall, patient states that she is feeling okay, and was told to call with any questions or concerns

## 2022-07-08 NOTE — PROGRESS NOTES
Assessment/Plan:    Problem List Items Addressed This Visit        Genitourinary    Endometrial cancer ([facility] ) - Primary (Chronic)     Recurrent stage IB endometrial cancer currently on ENDOBARR clinical trial presents  She has received 27 cycles  She has treatment-related fatigue which has worsened, and drug-related anemia       Labs from 7/8/22 reviewed  Proceed with next cycle of treatment as scheduled  Plan to hold rucaparib x 1 cycle given anemia with associated worsening fatigue      CT imaging per protocol scheduled August       Return to the office per protocol                Other    Drug-induced anemia     Persistent anemia 8 5-8 7 g/dL  Previously stable 9-10 g/dL (May 2022)  Patient has worsening fatigue  Per protocol, will hold rucaparib for 1 cycle, and evaluate if fatigue improves and hemoglobin rebounds  Burning with urination     Persistent dysuria despite treatment with levaquin for UTI  Repeat urine culture pending  Refill for pyridium provided  Relevant Medications    phenazopyridine (PYRIDIUM) 200 mg tablet            CHIEF COMPLAINT:   F/u on clinical trial, pre-cycle 28    Problem:  Cancer Staging  Endometrial cancer ([place] [id number] )  Staging form: Corpus Uteri - Carcinoma, AJCC 7th Edition  - Clinical stage from 12/19/2017: FIGO Stage IB (T1b, N0, M0) - Signed by Jonny Miranda MD on 2/12/2018        Previous therapy:  Oncology History   Endometrial cancer ([facility] )   11/17/2017 Initial Diagnosis    Endometrial cancer ([facility] )     11/17/2017 Biopsy    ENDOMETRIAL BIOPSY: WELL DIFFERENTIATED ENDOMETRIAL ADENOCARCINOMA (FIGO I) WITH FOCALMUCINOUS FEATURES  Part B: ENDOCERVICAL POLYP:BENIGN ENDOCERVICAL      12/19/2017 Surgery    Robotic assisted total laparoscopic hysterectomy with bilateral salpingo-oophorectomy and sentinel bilateral pelvic lymph node dissection   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 (4 4 x 3 2 cm tumor, 9 4/15 4mm invasion, NO LVSI, washings revealed atypical cellular changes)     12/19/2017 Genetic Testing    Morrison testing negative     6/28/2019 Biopsy    A  Breast, Right, US BX Right Breast 1000 4 cmfn:  - Benign breast tissue with focal histiocytic aggregate  See comment   - Negative for atypia and in-situ or invasive carcinoma  7/8/2019 Recurrence    Presented with right lower extremity DVT and CT demonstrating right pelvic sidewall mass with venous, ureteral and nerve compression causing significant neuropathic pain  Biopsy:  Lymph Node, Right pelvic lymph node x3:  - High-grade adenocarcinoma  7/30/2019 - 1/6/2020 Chemotherapy    Taxol 175 mg/m2 and carboplatin AUC 6 every 21 days  Dose was reduced to taxol 135 mg/m2 and carboplatin AUC 5  Completed 6 cycles  Treatment protracted due to multiple hospital admissions  2/24/2020 - 4/13/2020 Radiation    adjuvant external beam radiation therapy to the whole pelvis to 4500 cGy followed by boost to gross disease of an additional 2200 cGy     11/23/2020 Progression    New necrotic adenopathy in the retroperitoneum on CT      12/21/2020 -  Chemotherapy    Began chemotherapy with rucaparib, atezolizumab, and bevacizumab as per [facility] clinical trial            Patient ID: Manuela Landon is a 61 y o  female  who presents to the office for follow-up while on clinical trial  The patient has been afebrile  She notes treatment-related fatigue that has worsened over the past several weeks  She denies n/v/adbominal pain  She is without skin rash, bloody diarrhea, new/worsening cough/SOB  She notes dysuria that is persistent despite completion of oral levaquin per urology and pyridium  The patient has a repeat pending urine culture  Protocol labs from 7/8/22 reviewed  Hgb is persistently low at 8 7 g/dL  Previously, hgb stable 9-10 g/dL         The following portions of the patient's history were reviewed and updated as appropriate: allergies, current medications, past medical history, past surgical history and problem list     Review of Systems   Constitutional: Positive for fatigue  Negative for fever  HENT: Negative  Eyes: Negative  Respiratory: Negative  Cardiovascular: Negative  Gastrointestinal: Negative  Genitourinary:        As per HPI  Musculoskeletal: Negative  Skin: Negative  Neurological: Negative  Psychiatric/Behavioral: Negative  Current Outpatient Medications   Medication Sig Dispense Refill    acetaminophen-codeine (TYLENOL #3) 300-30 mg per tablet Take 1 tablet by mouth every 6 (six) hours as needed for moderate pain 20 tablet 0    ARIPiprazole (ABILIFY) 20 MG tablet Take 20 mg by mouth in the morning        aspirin (ECOTRIN LOW STRENGTH) 81 mg EC tablet Take 81 mg by mouth daily      Calcium-Magnesium-Vitamin D (CALCIUM 500 PO) Take by mouth daily       cholecalciferol (VITAMIN D3) 1,000 units tablet Take 1,000 Units by mouth daily      Cyanocobalamin (VITAMIN B12 PO) Take by mouth daily       dronabinol (MARINOL) 2 5 mg capsule Take 1 capsule (2 5 mg total) by mouth 2 (two) times a day before meals (Patient taking differently: Take 2 5 mg by mouth 2 (two) times a day before meals As needed) 60 capsule 0    enoxaparin (LOVENOX) 80 mg/0 8 mL Inject 0 8 mL (80 mg total) under the skin every 24 hours 30 mL 5    folic acid (FOLVITE) 1 mg tablet Take 1 tablet (1 mg total) by mouth daily  0    gemfibrozil (LOPID) 600 mg tablet TAKE 1 TABLET BY MOUTH EVERY DAY 90 tablet 0    lactulose (CHRONULAC) 10 g/15 mL solution TAKE 45 ML (30 G TOTAL) BY MOUTH 2 (TWO) TIMES A DAY as needed 300 mL 2    LORazepam (ATIVAN) 0 5 mg tablet Take 1 tablet (0 5 mg total) by mouth every 6 (six) hours as needed for anxiety (or nausea) 36 tablet 1    Multiple Vitamin (MULTIVITAMIN) tablet Take 1 tablet by mouth daily      omeprazole (PriLOSEC) 20 mg delayed release capsule Take 20 mg by mouth daily      oxybutynin (DITROPAN XL) 15 MG 24 hr tablet TAKE 1 TABLET BY MOUTH DAILY AT BEDTIME 90 tablet 3    oxyCODONE-acetaminophen (Percocet) 5-325 mg per tablet Take 1 tablet by mouth every 4 (four) hours as needed for moderate pain Max Daily Amount: 6 tablets 30 tablet 0    phenazopyridine (PYRIDIUM) 200 mg tablet Take 1 tablet (200 mg total) by mouth daily as needed for bladder spasms 10 tablet 0    rucaparib (RUBRACA) 300 mg tablet Take 600 mg by mouth every 12 (twelve) hours Take with or without food  Do not repeat a vomited dose   saccharomyces boulardii (FLORASTOR) 250 mg capsule Take 1 capsule (250 mg total) by mouth 2 (two) times a day  0    senna (SENOKOT) 8 6 MG tablet Take 1 tablet (8 6 mg total) by mouth 2 (two) times a day as needed for constipation 60 tablet 0    venlafaxine (EFFEXOR) 75 mg tablet Take 75 mg by mouth 3 (three) times a day        clotrimazole-betamethasone (LOTRISONE) 1-0 05 % cream Apply topically 2 (two) times a day (Patient not taking: No sig reported) 30 g 0    naloxone (NARCAN) 4 mg/0 1 mL nasal spray Administer 1 spray into a nostril  If no response after 2-3 minutes, give another dose in the other nostril using a new spray  (Patient not taking: No sig reported) 1 each 0    ondansetron (ZOFRAN) 8 mg tablet Take 1 tablet (8 mg total) by mouth every 8 (eight) hours as needed for nausea or vomiting (Patient not taking: No sig reported) 30 tablet 1     No current facility-administered medications for this visit  Objective:    Blood pressure 122/70, pulse 71, temperature (!) 97 3 °F (36 3 °C), temperature source Temporal, resp  rate 16, height 4' 11" (1 499 m), weight 53 5 kg (118 lb), SpO2 99 %, not currently breastfeeding  Body mass index is 23 83 kg/m²  Body surface area is 1 47 meters squared  Physical Exam  Vitals reviewed  Constitutional:       General: She is not in acute distress  Appearance: Normal appearance  She is not ill-appearing  HENT:      Head: Normocephalic and atraumatic        Mouth/Throat: Mouth: Mucous membranes are moist    Eyes:      General: No scleral icterus  Right eye: No discharge  Left eye: No discharge  Conjunctiva/sclera: Conjunctivae normal    Pulmonary:      Effort: Pulmonary effort is normal    Musculoskeletal:      Right lower leg: No edema  Left lower leg: No edema  Skin:     General: Skin is warm and dry  Coloration: Skin is not jaundiced  Findings: No rash  Neurological:      General: No focal deficit present  Mental Status: She is alert and oriented to person, place, and time  Cranial Nerves: No cranial nerve deficit  Sensory: No sensory deficit  Motor: No weakness  Gait: Gait normal    Psychiatric:         Mood and Affect: Mood normal          Behavior: Behavior normal          Thought Content: Thought content normal          Judgment: Judgment normal      Performance status is one           Lab Results   Component Value Date     10/27/2017    K 3 7 07/08/2022     07/08/2022    CO2 23 07/08/2022    BUN 19 07/08/2022    CREATININE 0 97 07/08/2022    GLUCOSE 219 (H) 12/19/2017    GLUF 100 (H) 07/08/2022    CALCIUM 9 4 07/08/2022    CORRECTEDCA 9 9 07/08/2022    AST 13 07/08/2022    ALT 16 07/08/2022    ALKPHOS 124 (H) 07/08/2022    PROT 6 9 10/27/2017    BILITOT 0 3 10/27/2017    EGFR 61 07/08/2022     Lab Results   Component Value Date    WBC 7 36 07/08/2022    HGB 8 7 (L) 07/08/2022    HCT 28 1 (L) 07/08/2022     (H) 07/08/2022     07/08/2022     Lab Results   Component Value Date    NEUTROABS 5 42 07/08/2022

## 2022-07-08 NOTE — ASSESSMENT & PLAN NOTE
Persistent dysuria despite treatment with levaquin for UTI  Repeat urine culture pending  Refill for pyridium provided

## 2022-07-11 ENCOUNTER — NUTRITION (OUTPATIENT)
Dept: NUTRITION | Facility: CLINIC | Age: 67
End: 2022-07-11

## 2022-07-11 ENCOUNTER — HOSPITAL ENCOUNTER (OUTPATIENT)
Dept: INFUSION CENTER | Facility: CLINIC | Age: 67
Discharge: HOME | End: 2022-07-11
Payer: COMMERCIAL

## 2022-07-11 ENCOUNTER — DOCUMENTATION (OUTPATIENT)
Dept: OTHER | Facility: HOSPITAL | Age: 67
End: 2022-07-11

## 2022-07-11 VITALS
BODY MASS INDEX: 23.99 KG/M2 | TEMPERATURE: 97.1 F | RESPIRATION RATE: 16 BRPM | SYSTOLIC BLOOD PRESSURE: 124 MMHG | HEIGHT: 59 IN | DIASTOLIC BLOOD PRESSURE: 82 MMHG | WEIGHT: 119 LBS | HEART RATE: 63 BPM | OXYGEN SATURATION: 91 %

## 2022-07-11 DIAGNOSIS — Z71.3 NUTRITIONAL COUNSELING: Primary | ICD-10-CM

## 2022-07-11 PROCEDURE — J9999Q0 INV ATEZOLIZUMAB 1200MG/20ML 20 ML: Performed by: PHYSICIAN ASSISTANT

## 2022-07-11 PROCEDURE — 96417 CHEMO IV INFUS EACH ADDL SEQ: CPT

## 2022-07-11 PROCEDURE — J9035Q0 INV BEVACIZUMAB 400MG/16ML 16 ML: Performed by: PHYSICIAN ASSISTANT

## 2022-07-11 PROCEDURE — 96413 CHEMO IV INFUSION 1 HR: CPT

## 2022-07-11 RX ADMIN — SODIUM CHLORIDE 20 ML/HR: 0.9 INJECTION, SOLUTION INTRAVENOUS at 08:23

## 2022-07-11 RX ADMIN — Medication 775 MG: at 09:45

## 2022-07-11 RX ADMIN — Medication 1200 MG: at 09:01

## 2022-07-11 NOTE — PATIENT INSTRUCTIONS
Nutrition Rx & Recommendations:  Diet: High Calorie, High Protein (for high calorie foods see pages 52-53, and for high protein foods see pages 49-51 in your Eating Hints book)  Small, frequent meals/snacks may be easier to tolerate than 3 large daily meals  Aim for 5-6 small meals per day (every 2-3 hours)  Include protein at all meals/snacks  Follow proper oral care; Try baking soda/salt water rinse recipe (mix 3/4 tsp salt + 1 tsp baking soda + 1 qt water; rinse with plain water after using) in Eating Hints book (pg 18)  Brush your teeth before/after meals & before bed  For sore mouth/throat: choose foods that are easy to chew/swallow; cook foods until they are soft/tender; moisten & soften foods (gravy/sauce/broth/yogurt); cut food into small pieces; drink with a straw (as tolerated); eat with a very small spoon; eat cold or room temperature food; suck on ice chips; Avoid citrus, spicy foods, tomatoes/ketchup, salty foods, raw vegetables, sharp/crunchy/hard foods & alcohol (see pages 23-28 in your Eating Hints book)  For dry mouth: sip water throughout the day; try very sweet (or tart, as tolerated) drinks; chew gum or suck on hard candy, popsicles, & ice chips; eat easy to swallow foods (such as pureed cooked foods or soups); moisten food with sauce/gravy/salad dressing; do not drink beer, wine, or any type of alcohol; keep lips moist with lip balm; avoid tobacco products & second-hand smoke; try Biotene as needed (see pages 17-18 in your Eating Hints book)  Follow proper oral care; Try baking soda/salt water rinse recipe (mix 3/4 tsp salt + 1 tsp baking soda + 1 qt water; rinse with pain water after using) in Eating Hints book (pg 18)  Brush your teeth before/after meals & before bed  Weigh yourself regularly  If you notice weight loss, make an effort to increase your daily food/calorie intake   If you continue to notice loss after these efforts, reach out to your dietitian to establish a plan to stabilize weight  Snack ideas: [place] yogurt with fruit, pudding, veggies with humus OR try greek yogurt based vegetable dip, peanut butter, grapes and cheese, fruit with a cheese stick , cottage cheese, soup, unjury, protein shakes/smoothies  Continue to keep a food journal as long as it is helpful  Try to eat something every 1 hr, set alarm on your phone as a reminder    Patient choosen goals:   Increase vegetable intake  Make sure you have 1 serving with lunch and dinner  Examples: broccoli, asparagus, carrots, celery, corn, zucchini  Try making bean salad: black beans, corn, tomato, peppers, red onion, cilantro, balsamic  Nutrition Supplements:    Re-start Premier Protein Shake 1X/day in the morning    Follow Up Plan: 8/1 during infusion   Recommend Referral to Other Providers: none at this time

## 2022-07-11 NOTE — PROGRESS NOTES
Patient was seen in the infusion center for cycle 28  Patient  returned Cycle 27 study pills, and diary  Cycle 28 pill dispense is held due to anemia induced fatigue  Patient aware that we will recheck labs and restart on cycle 29  Patient denied any changes to conmeds since office visit prior to infusion  Patient did say that she still  Feels some UTI symptoms but is waiting on the urine culture ordered by urology before they will give another antibiotic   Patient was told to call with any questions or concerns, Patient tolerates treatment without incident

## 2022-07-11 NOTE — PROGRESS NOTES
Pt to clinic for clinical trial, pt tolerated infusion without complications, aware of next appointment, declined avs

## 2022-07-12 ENCOUNTER — OFFICE VISIT (OUTPATIENT)
Dept: UROLOGY | Facility: AMBULATORY SURGERY CENTER | Age: 67
End: 2022-07-12
Payer: COMMERCIAL

## 2022-07-12 ENCOUNTER — TELEPHONE (OUTPATIENT)
Dept: UROLOGY | Facility: AMBULATORY SURGERY CENTER | Age: 67
End: 2022-07-12

## 2022-07-12 VITALS
BODY MASS INDEX: 23.99 KG/M2 | WEIGHT: 119 LBS | DIASTOLIC BLOOD PRESSURE: 80 MMHG | HEART RATE: 77 BPM | HEIGHT: 59 IN | SYSTOLIC BLOOD PRESSURE: 122 MMHG

## 2022-07-12 DIAGNOSIS — N39.0 RECURRENT UTI: Primary | ICD-10-CM

## 2022-07-12 PROCEDURE — 99214 OFFICE O/P EST MOD 30 MIN: CPT | Performed by: NURSE PRACTITIONER

## 2022-07-12 RX ORDER — LIDOCAINE HCL 4 %
1 CREAM (GRAM) TOPICAL DAILY
Qty: 90 CAPSULE | Refills: 3 | Status: SHIPPED | OUTPATIENT
Start: 2022-07-12

## 2022-07-12 RX ORDER — NITROFURANTOIN 25; 75 MG/1; MG/1
100 CAPSULE ORAL 2 TIMES DAILY
Qty: 10 CAPSULE | Refills: 0 | Status: SHIPPED | OUTPATIENT
Start: 2022-07-12 | End: 2022-08-23

## 2022-07-12 NOTE — PROGRESS NOTES
7/12/2022    Assessment and Plan    61 y o  female managed by Dr Denae Mancuso    1  Hydronephrosis secondary to right ureteral obstruction  · Secondary to endometrial cancer  · Status post insertion nephrostomy tube-exchanged intervention Radiology 06/09/2022  · Next exchange due 09/2022  · Nephrostomy tube site unremarkable  · Previously failed capping trial and presented to IR 6/9/2022 for re-conversion of PCN to PCNU  Nephrostogram shows the following:  · Occlusion to mid right ureter  · Occlusion crossed with guidewire and catheter 10 F placed     2  Atrophic vaginitis  · Continue with recommendations from GYN    3  Overactive bladder symptoms  · Continue oxybutynin  · Maintain adequate hydration upwards to 40 oz of water intake per day while avoiding bladder irritating foods beverages  · Ensure complete bladder emptying with urination  · No caffeinated beverages after 3:00 p m     4  Urinary Tract Infection   · Previously identified infection on culture with enetercoccus  · Failed therapy with Levaquin  · Macrobid provided   · Will prescribe Vitamin C and Cranberry daily   · Maintain hydration upwards to 40-60 oz daily       History of Present Illness  Ragini Melendez is a 61 y o  female here for follow up evaluation of  hydronephrosis secondary to endometrial cancer   She is undergoing chemotherapy and clinical trials for advancement of disease   She has a nephrostomy tube most recently exchanged by intervention Radiology 06/09/2022  She denies complications with percutaneous nephrostomy tube         Component       BUN Creatinine   Latest Ref Rng & Units       5 - 25 mg/dL 0 60 - 1 30 mg/dL   1/21/2022       24 1 05   2/11/2022       22 1 02   3/4/2022       18 0 99   3/25/2022       12 1 04   4/15/2022       17 0 90   5/6/2022       15 0 81   5/26/2022       21 0 76   6/17/2022       13 0 94   7/8/2022       19 0 97     Component       eGFR   Latest Ref Rng & Units       ml/min/1 73sq m   1/21/2022       56 2/11/2022       58   3/4/2022       60   3/25/2022       56   4/15/2022       67   5/6/2022       76   5/26/2022       83   6/17/2022       64   7/8/2022       61         Review of Systems   Constitutional: Negative for chills and fever  Respiratory: Negative for cough and shortness of breath  Cardiovascular: Negative for chest pain  Gastrointestinal: Negative for abdominal distention, abdominal pain, blood in stool, nausea and vomiting  Genitourinary: Negative for difficulty urinating, dysuria, enuresis, flank pain, frequency, hematuria and urgency  Skin: Negative for rash                    Past Medical History  Past Medical History:   Diagnosis Date    Anemia     Chemotherapy follow-up examination     Depression     Endometrial cancer ([facility] [address] ) 12/2017    Hyperglycemia     vx type 2 dm -- last assessed 4/1/14; resolved 11/7/17    Hyperlipidemia     Hypertension     Obesity     last assessed 10/14/17; resolved 11/7/17       Past Social History  Past Surgical History:   Procedure Laterality Date    ABDOMINAL SURGERY      GASTRIC BYPASS    CHOLECYSTECTOMY      at the time of gastric bypass    COLONOSCOPY      CT NEEDLE BIOPSY LYMPH NODE  7/8/2019    FL GUIDED CENTRAL VENOUS ACCESS DEVICE INSERTION  11/12/2019    GASTRIC BYPASS      HYSTERECTOMY Bilateral     total abdominal with salpingo-oophorectomy    IR NEPHROSTOMY TO NEPHROURETERAL STENT  6/9/2022    IR NEPHROSTOMY TUBE CHECK/CHANGE/REPOSITION/REINSERTION/UPSIZE  5/27/2022    IR NEPHROSTOMY TUBE PLACEMENT  7/26/2019    IR NEPHROURETERAL STENT CHECK/CHANGE/REPOSITION  4/6/2021    IR NEPHROURETERAL STENT CHECK/CHANGE/REPOSITION  6/4/2021    IR NEPHROURETERAL STENT CHECK/CHANGE/REPOSITION  9/3/2021    IR NEPHROURETERAL STENT CHECK/CHANGE/REPOSITION  12/7/2021    IR NEPHROURETERAL STENT CHECK/CHANGE/REPOSITION  3/8/2022    IR NEPHROURETERAL STENT CHECK/CHANGE/REPOSITION  5/10/2022    IR PICC LINE  9/27/2019    IR PORT PLACEMENT 7/26/2019    IR PORT REMOVAL  9/20/2019    OOPHORECTOMY Bilateral     RI INSJ TUNNELED CTR VAD W/SUBQ PORT AGE 5 YR/> Left 11/12/2019    Procedure: INSERTION VENOUS PORT ( PORT-A-CATH) IR;  Surgeon: Zay Blake DO;  [facility]: [facility];  Service: Interventional Radiology    RI LAP, RADICAL HYST W/ TUBE&OV, NODE BX N/A 12/19/2017    Procedure: HYSTERECTOMY LAPAROSCOPIC TOTAL ([address]) W/ ROBOTICS; BILATERAL SALPINGOOPHERECTOMY; LYMPH NODE DISSECTION; lysis of adhesions;  Surgeon: Luana Villegas MD;  [facility];  Service: Gynecology Oncology    RI LAP,DIAGNOSTIC ABDOMEN N/A 12/19/2017    Procedure: LAPAROSCOPY DIAGNOSTIC;  Surgeon: Luana Villegas MD;  [facility];  Service: Gynecology Oncology    TONSILLECTOMY      US GUIDED BREAST BIOPSY RIGHT COMPLETE Right 6/28/2019     Social History     Tobacco Use   Smoking Status Never Smoker   Smokeless Tobacco Never Used       Past Family History  Family History   Problem Relation Age of Onset    Other Mother         dyslipidemia    Ovarian cancer Mother 48    Lymphoma Father     Bone cancer Maternal Grandfather     No Known Problems Sister     No Known Problems Maternal Grandmother     Uterine cancer Paternal Grandmother     No Known Problems Paternal Grandfather     No Known Problems Maternal Aunt     No Known Problems Maternal Aunt     No Known Problems Maternal Aunt     No Known Problems Maternal Aunt     No Known Problems Paternal Aunt     No Known Problems Paternal Aunt     No Known Problems Paternal Aunt     No Known Problems Paternal Aunt     No Known Problems Brother     No Known Problems Brother        Past Social history  Social History     Socioeconomic History    Marital status: Single     Spouse name: Not on file    Number of children: Not on file    Years of education: Not on file    Highest education level: Not on file   Occupational History    Not on file   Tobacco Use    Smoking status: Never Smoker  Smokeless tobacco: Never Used   Vaping Use    Vaping Use: Never used   Substance and Sexual Activity    Alcohol use: Not Currently    Drug use: No    Sexual activity: Not Currently   Other Topics Concern    Not on file   Social History Narrative    Not on file     Social Determinants of Health     Financial Resource Strain: Not on file   Food Insecurity: Not on file   Transportation Needs: Not on file   Physical Activity: Not on file   Stress: Not on file   Social Connections: Not on file   Intimate Partner Violence: Not on file   Housing Stability: Not on file       Current Medications  Current Outpatient Medications   Medication Sig Dispense Refill    ARIPiprazole (ABILIFY) 20 MG tablet Take 20 mg by mouth in the morning        aspirin (ECOTRIN LOW STRENGTH) 81 mg EC tablet Take 81 mg by mouth daily      Calcium-Magnesium-Vitamin D (CALCIUM 500 PO) Take by mouth daily       cholecalciferol (VITAMIN D3) 1,000 units tablet Take 1,000 Units by mouth daily      Cranberry-Vitamin C 250-60 MG CAPS Take 1 tablet by mouth in the morning 90 capsule 3    Cyanocobalamin (VITAMIN B12 PO) Take by mouth daily       enoxaparin (LOVENOX) 80 mg/0 8 mL Inject 0 8 mL (80 mg total) under the skin every 24 hours 30 mL 5    folic acid (FOLVITE) 1 mg tablet Take 1 tablet (1 mg total) by mouth daily  0    gemfibrozil (LOPID) 600 mg tablet TAKE 1 TABLET BY MOUTH EVERY DAY 90 tablet 0    Multiple Vitamin (MULTIVITAMIN) tablet Take 1 tablet by mouth daily      nitrofurantoin (MACROBID) 100 mg capsule Take 1 capsule (100 mg total) by mouth 2 (two) times a day 10 capsule 0    omeprazole (PriLOSEC) 20 mg delayed release capsule Take 20 mg by mouth daily      oxybutynin (DITROPAN XL) 15 MG 24 hr tablet TAKE 1 TABLET BY MOUTH DAILY AT BEDTIME 90 tablet 3    phenazopyridine (PYRIDIUM) 200 mg tablet Take 1 tablet (200 mg total) by mouth daily as needed for bladder spasms 10 tablet 0    saccharomyces boulardii (FLORASTOR) 250 mg capsule Take 1 capsule (250 mg total) by mouth 2 (two) times a day  0    venlafaxine (EFFEXOR) 75 mg tablet Take 75 mg by mouth 3 (three) times a day        acetaminophen-codeine (TYLENOL #3) 300-30 mg per tablet Take 1 tablet by mouth every 6 (six) hours as needed for moderate pain (Patient not taking: Reported on 7/12/2022) 20 tablet 0    clotrimazole-betamethasone (LOTRISONE) 1-0 05 % cream Apply topically 2 (two) times a day 30 g 0    dronabinol (MARINOL) 2 5 mg capsule Take 1 capsule (2 5 mg total) by mouth 2 (two) times a day before meals (Patient taking differently: Take 2 5 mg by mouth 2 (two) times a day before meals As needed) 60 capsule 0    lactulose (CHRONULAC) 10 g/15 mL solution TAKE 45 ML (30 G TOTAL) BY MOUTH 2 (TWO) TIMES A DAY as needed 300 mL 2    LORazepam (ATIVAN) 0 5 mg tablet Take 1 tablet (0 5 mg total) by mouth every 6 (six) hours as needed for anxiety (or nausea) (Patient not taking: Reported on 7/12/2022) 36 tablet 1    naloxone (NARCAN) 4 mg/0 1 mL nasal spray Administer 1 spray into a nostril  If no response after 2-3 minutes, give another dose in the other nostril using a new spray  1 each 0    ondansetron (ZOFRAN) 8 mg tablet Take 1 tablet (8 mg total) by mouth every 8 (eight) hours as needed for nausea or vomiting 30 tablet 1    oxyCODONE-acetaminophen (Percocet) 5-325 mg per tablet Take 1 tablet by mouth every 4 (four) hours as needed for moderate pain Max Daily Amount: 6 tablets 30 tablet 0    rucaparib (RUBRACA) 300 mg tablet Take 600 mg by mouth every 12 (twelve) hours Take with or without food  Do not repeat a vomited dose  (Patient not taking: Reported on 7/12/2022)      senna (SENOKOT) 8 6 MG tablet Take 1 tablet (8 6 mg total) by mouth 2 (two) times a day as needed for constipation (Patient not taking: Reported on 7/12/2022) 60 tablet 0     No current facility-administered medications for this visit       Facility-Administered Medications Ordered in Other Visits   Medication Dose Route Frequency Provider Last Rate Last Admin    sodium chloride 0 9 % infusion  20 mL/hr Intravenous Continuous Jacque Uriostegui PA-C   Stopped at 07/11/22 1031       Allergies  Allergies   Allergen Reactions    Cephalosporins Rash     Which Cephalosporin reaction was to not specified; however, has tolerated Amoxicillin, Cefazolin, and Cefepime         The following portions of the patient's history were reviewed and updated as appropriate: allergies, current medications, past medical history, past social history, past surgical history and problem list       Vitals  Vitals:    07/12/22 1051   BP: 122/80   Pulse: 77   Weight: 54 kg (119 lb)   Height: 4' 11" (1 499 m)           Physical Exam  Physical Exam  Vitals reviewed  Constitutional:       General: She is not in acute distress  Appearance: Normal appearance  Cardiovascular:      Heart sounds: Normal heart sounds  Pulmonary:      Effort: Pulmonary effort is normal  No respiratory distress  Breath sounds: Normal breath sounds  Abdominal:      Tenderness: There is no right CVA tenderness or left CVA tenderness  Musculoskeletal:         General: Normal range of motion  Skin:     General: Skin is warm and dry  Neurological:      General: No focal deficit present  Mental Status: She is alert  Psychiatric:         Mood and Affect: Mood normal          Behavior: Behavior normal            Results  No results found for this or any previous visit (from the past 1 hour(s))  ]  No results found for: PSA  Lab Results   Component Value Date    GLUCOSE 219 (H) 12/19/2017    CALCIUM 9 4 07/08/2022     10/27/2017    K 3 7 07/08/2022    CO2 23 07/08/2022     07/08/2022    BUN 19 07/08/2022    CREATININE 0 97 07/08/2022     Lab Results   Component Value Date    WBC 7 36 07/08/2022    HGB 8 7 (L) 07/08/2022    HCT 28 1 (L) 07/08/2022     (H) 07/08/2022     07/08/2022           Orders  No orders of the defined types were placed in this encounter        RAFITA Tavera

## 2022-07-12 NOTE — TELEPHONE ENCOUNTER
Pt will require a visit with the below instructions  ( No available appointments)    She states she will be on vacation September 10-17 and unavailable for an appointment  Return in about 6 weeks (around 8/23/2022) for ct 8/15- See FT 1-2 weeks after but before IR appointment, Recheck

## 2022-07-20 ENCOUNTER — TELEPHONE (OUTPATIENT)
Dept: GYNECOLOGIC ONCOLOGY | Facility: CLINIC | Age: 67
End: 2022-07-20

## 2022-07-20 NOTE — TELEPHONE ENCOUNTER
Patient states she has  had this discomfort for a while--it is intermittent in nature and has not gotten any worse -it remains the same  She reported this before and she was checked for a UTI which was negative  The discomfort remains and is described as  intermittent vaginal burning and itching in nature (with burning being more often that the itching)  No vaginal bleeding or any vaginal discharge  Analgesics takes the "edge off" but not really effective  She would like to know what if anything can be done for this

## 2022-07-20 NOTE — TELEPHONE ENCOUNTER
Patient schedule for exam tomorrow with Hugo Garza in [facility]

## 2022-07-20 NOTE — TELEPHONE ENCOUNTER
Patient called states she is in a lot of pain, Would like to know what can she do or if any medication can be prescribed for it

## 2022-07-21 ENCOUNTER — OFFICE VISIT (OUTPATIENT)
Dept: GYNECOLOGIC ONCOLOGY | Facility: CLINIC | Age: 67
End: 2022-07-21
Payer: COMMERCIAL

## 2022-07-21 VITALS
OXYGEN SATURATION: 99 % | DIASTOLIC BLOOD PRESSURE: 74 MMHG | RESPIRATION RATE: 17 BRPM | SYSTOLIC BLOOD PRESSURE: 124 MMHG | BODY MASS INDEX: 24.6 KG/M2 | TEMPERATURE: 97.4 F | HEIGHT: 59 IN | WEIGHT: 122 LBS | HEART RATE: 70 BPM

## 2022-07-21 DIAGNOSIS — N95.2 VAGINAL ATROPHY: Primary | ICD-10-CM

## 2022-07-21 DIAGNOSIS — N90.89 VULVAR IRRITATION: ICD-10-CM

## 2022-07-21 PROCEDURE — 99214 OFFICE O/P EST MOD 30 MIN: CPT | Performed by: NURSE PRACTITIONER

## 2022-07-21 PROCEDURE — 3074F SYST BP LT 130 MM HG: CPT | Performed by: NURSE PRACTITIONER

## 2022-07-21 PROCEDURE — 3078F DIAST BP <80 MM HG: CPT | Performed by: NURSE PRACTITIONER

## 2022-07-21 RX ORDER — ESTRADIOL 0.1 MG/G
1 CREAM VAGINAL DAILY
Qty: 42.5 G | Refills: 1 | Status: SHIPPED | OUTPATIENT
Start: 2022-07-21 | End: 2022-08-09 | Stop reason: SDUPTHER

## 2022-07-21 NOTE — ASSESSMENT & PLAN NOTE
61year-old with new vulvar irritation that has become more bothersome in recent weeks  She is currently on the [facility] trial for recurrent endometrial cancer  Atrophy noted on pelvic exam, but there are no masses, lesions, bleeding, or discharge  Reviewed topical vaginal estrogen rx with Dr Pierre Pereyra  Discussed with the patient the potential risks of topical vaginal estrogen, including VTE (pt anticoagulated on Lovenox), cardiovascular disease, and malignancy  She would like to proceed  Rx sent to pharmacy  Patient will RTO as per her chemotherapy calendar

## 2022-07-21 NOTE — PROGRESS NOTES
Assessment/Plan:    Problem List Items Addressed This Visit        Genitourinary    Vaginal atrophy - Primary    Relevant Medications    estradiol (ESTRACE VAGINAL) 0 1 mg/g vaginal cream       Other    Vulvar irritation     61year-old with new vulvar irritation that has become more bothersome in recent weeks  She is currently on the [facility] trial for recurrent endometrial cancer  Atrophy noted on pelvic exam, but there are no masses, lesions, bleeding, or discharge  Reviewed topical vaginal estrogen rx with Dr Velazquez Rather  Discussed with the patient the potential risks of topical vaginal estrogen, including VTE (pt anticoagulated on Lovenox), cardiovascular disease, and malignancy  She would like to proceed  Rx sent to pharmacy  Patient will RTO as per her chemotherapy calendar  CHIEF COMPLAINT: Vulvar irritation, vaginal atrophy      Subjective:     Problem:  Cancer Staging  Endometrial cancer ([facility] )  Staging form: Corpus Uteri - Carcinoma, AJCC 7th Edition  - Clinical stage from 12/19/2017: FIGO Stage IB (T1b, N0, M0) - Signed by Roxanna Cabrera MD on 2/12/2018      Previous therapy:  Oncology History   Endometrial cancer ([facility] )   11/17/2017 Initial Diagnosis    Endometrial cancer ([facility] )     11/17/2017 Biopsy    ENDOMETRIAL BIOPSY: WELL DIFFERENTIATED ENDOMETRIAL ADENOCARCINOMA (FIGO I) WITH FOCALMUCINOUS FEATURES  Part B: ENDOCERVICAL POLYP:BENIGN ENDOCERVICAL      12/19/2017 Surgery    Robotic assisted total laparoscopic hysterectomy with bilateral salpingo-oophorectomy and sentinel bilateral pelvic lymph node dissection  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 (4 4 x 3 2 cm tumor, 9 4/15 4mm invasion, NO LVSI, washings revealed atypical cellular changes)     12/19/2017 Genetic Testing    Morrison testing negative     6/28/2019 Biopsy    A  Breast, Right, US BX Right Breast 1000 4 cmfn:  - Benign breast tissue with focal histiocytic aggregate    See comment   - Negative for atypia and in-situ or invasive carcinoma  7/8/2019 Recurrence    Presented with right lower extremity DVT and CT demonstrating right pelvic sidewall mass with venous, ureteral and nerve compression causing significant neuropathic pain  Biopsy:  Lymph Node, Right pelvic lymph node x3:  - High-grade adenocarcinoma  7/30/2019 - 1/6/2020 Chemotherapy    Taxol 175 mg/m2 and carboplatin AUC 6 every 21 days  Dose was reduced to taxol 135 mg/m2 and carboplatin AUC 5  Completed 6 cycles  Treatment protracted due to multiple hospital admissions  2/24/2020 - 4/13/2020 Radiation    adjuvant external beam radiation therapy to the whole pelvis to 4500 cGy followed by boost to gross disease of an additional 2200 cGy     11/23/2020 Progression    New necrotic adenopathy in the retroperitoneum on CT      12/21/2020 -  Chemotherapy    Began chemotherapy with rucaparib, atezolizumab, and bevacizumab as per [facility] clinical trial            Patient ID: Leisa Burr is a 61 y o  female     Patient presents for problem visit with concern of vaginal irritation  This began several weeks ago  She describes an itching/burning sensation  She is without vaginal bleeding or discharge  She denies palpating any gross masses or lesions  Review of Systems   Constitutional: Positive for fatigue and unexpected weight change  Negative for chills and fever  HENT: Negative for nosebleeds  Eyes: Negative  Respiratory: Negative for cough, chest tightness, shortness of breath and wheezing  Cardiovascular: Negative for chest pain, palpitations and leg swelling  Gastrointestinal: Negative for abdominal distention, abdominal pain, anal bleeding, blood in stool, constipation, diarrhea, nausea, rectal pain and vomiting  Endocrine: Negative  Genitourinary: Negative for difficulty urinating, dysuria, frequency, hematuria, pelvic pain, urgency, vaginal bleeding, vaginal discharge and vaginal pain          Vulvar itching   Musculoskeletal: Positive for arthralgias  Negative for joint swelling  Skin: Negative for color change, pallor and rash  Neurological: Negative for dizziness, weakness, light-headedness, numbness and headaches  Hematological: Negative  Psychiatric/Behavioral: Negative  Current Outpatient Medications   Medication Sig Dispense Refill    ARIPiprazole (ABILIFY) 20 MG tablet Take 20 mg by mouth in the morning   aspirin (ECOTRIN LOW STRENGTH) 81 mg EC tablet Take 81 mg by mouth daily      Calcium-Magnesium-Vitamin D (CALCIUM 500 PO) Take by mouth daily       cholecalciferol (VITAMIN D3) 1,000 units tablet Take 1,000 Units by mouth daily      clotrimazole-betamethasone (LOTRISONE) 1-0 05 % cream Apply topically 2 (two) times a day 30 g 0    Cranberry-Vitamin C 250-60 MG CAPS Take 1 tablet by mouth in the morning 90 capsule 3    Cyanocobalamin (VITAMIN B12 PO) Take by mouth daily       dronabinol (MARINOL) 2 5 mg capsule Take 1 capsule (2 5 mg total) by mouth 2 (two) times a day before meals (Patient taking differently: Take 2 5 mg by mouth 2 (two) times a day before meals As needed) 60 capsule 0    enoxaparin (LOVENOX) 80 mg/0 8 mL Inject 0 8 mL (80 mg total) under the skin every 24 hours 30 mL 5    estradiol (ESTRACE VAGINAL) 0 1 mg/g vaginal cream Insert 1 g into the vagina daily 48 8 g 1    folic acid (FOLVITE) 1 mg tablet Take 1 tablet (1 mg total) by mouth daily  0    gemfibrozil (LOPID) 600 mg tablet TAKE 1 TABLET BY MOUTH EVERY DAY 90 tablet 0    lactulose (CHRONULAC) 10 g/15 mL solution TAKE 45 ML (30 G TOTAL) BY MOUTH 2 (TWO) TIMES A DAY as needed 300 mL 2    Multiple Vitamin (MULTIVITAMIN) tablet Take 1 tablet by mouth daily      naloxone (NARCAN) 4 mg/0 1 mL nasal spray Administer 1 spray into a nostril  If no response after 2-3 minutes, give another dose in the other nostril using a new spray   1 each 0    nitrofurantoin (MACROBID) 100 mg capsule Take 1 capsule (100 mg total) by mouth 2 (two) times a day 10 capsule 0    omeprazole (PriLOSEC) 20 mg delayed release capsule Take 20 mg by mouth daily      ondansetron (ZOFRAN) 8 mg tablet Take 1 tablet (8 mg total) by mouth every 8 (eight) hours as needed for nausea or vomiting 30 tablet 1    oxybutynin (DITROPAN XL) 15 MG 24 hr tablet TAKE 1 TABLET BY MOUTH DAILY AT BEDTIME 90 tablet 3    oxyCODONE-acetaminophen (Percocet) 5-325 mg per tablet Take 1 tablet by mouth every 4 (four) hours as needed for moderate pain Max Daily Amount: 6 tablets 30 tablet 0    phenazopyridine (PYRIDIUM) 200 mg tablet Take 1 tablet (200 mg total) by mouth daily as needed for bladder spasms 10 tablet 0    saccharomyces boulardii (FLORASTOR) 250 mg capsule Take 1 capsule (250 mg total) by mouth 2 (two) times a day  0    venlafaxine (EFFEXOR) 75 mg tablet Take 75 mg by mouth 3 (three) times a day        acetaminophen-codeine (TYLENOL #3) 300-30 mg per tablet Take 1 tablet by mouth every 6 (six) hours as needed for moderate pain (Patient not taking: No sig reported) 20 tablet 0    LORazepam (ATIVAN) 0 5 mg tablet Take 1 tablet (0 5 mg total) by mouth every 6 (six) hours as needed for anxiety (or nausea) (Patient not taking: No sig reported) 36 tablet 1    rucaparib (RUBRACA) 300 mg tablet Take 600 mg by mouth every 12 (twelve) hours Take with or without food  Do not repeat a vomited dose  (Patient not taking: No sig reported)      senna (SENOKOT) 8 6 MG tablet Take 1 tablet (8 6 mg total) by mouth 2 (two) times a day as needed for constipation (Patient not taking: No sig reported) 60 tablet 0     No current facility-administered medications for this visit         Allergies   Allergen Reactions    Cephalosporins Rash     Which Cephalosporin reaction was to not specified; however, has tolerated Amoxicillin, Cefazolin, and Cefepime       Past Medical History:   Diagnosis Date    Anemia     Chemotherapy follow-up examination     Depression     Endometrial cancer ([facility] ) 12/2017    Hyperglycemia     vx type 2 dm -- last assessed 4/1/14; resolved 11/7/17    Hyperlipidemia     Hypertension     Obesity     last assessed 10/14/17; resolved 11/7/17       Past Surgical History:   Procedure Laterality Date    ABDOMINAL SURGERY      GASTRIC BYPASS    CHOLECYSTECTOMY      at the time of gastric bypass    COLONOSCOPY      CT NEEDLE BIOPSY LYMPH NODE  7/8/2019    FL GUIDED CENTRAL VENOUS ACCESS DEVICE INSERTION  11/12/2019    GASTRIC BYPASS      HYSTERECTOMY Bilateral     total abdominal with salpingo-oophorectomy    IR NEPHROSTOMY TO NEPHROURETERAL STENT  6/9/2022    IR NEPHROSTOMY TUBE CHECK/CHANGE/REPOSITION/REINSERTION/UPSIZE  5/27/2022    IR NEPHROSTOMY TUBE PLACEMENT  7/26/2019    IR NEPHROURETERAL STENT CHECK/CHANGE/REPOSITION  4/6/2021    IR NEPHROURETERAL STENT CHECK/CHANGE/REPOSITION  6/4/2021    IR NEPHROURETERAL STENT CHECK/CHANGE/REPOSITION  9/3/2021    IR NEPHROURETERAL STENT CHECK/CHANGE/REPOSITION  12/7/2021    IR NEPHROURETERAL STENT CHECK/CHANGE/REPOSITION  3/8/2022    IR NEPHROURETERAL STENT CHECK/CHANGE/REPOSITION  5/10/2022    IR PICC LINE  9/27/2019    IR PORT PLACEMENT  7/26/2019    IR PORT REMOVAL  9/20/2019    OOPHORECTOMY Bilateral     MD INSJ TUNNELED CTR VAD W/SUBQ PORT AGE 5 YR/> Left 11/12/2019    Procedure: INSERTION VENOUS PORT ( PORT-A-CATH) IR;  Surgeon: Skye Multani DO;  [facility]: [facility];  Service: Interventional Radiology    MD LAP, RADICAL HYST W/ TUBE&OV, NODE BX N/A 12/19/2017    Procedure: HYSTERECTOMY LAPAROSCOPIC TOTAL ([address] [place] [address]) W/ ROBOTICS; BILATERAL SALPINGOOPHERECTOMY; LYMPH NODE DISSECTION; lysis of adhesions;  Surgeon: Pilar St MD;  Location: [facility];  Service: Gynecology Oncology    MD LAP,DIAGNOSTIC ABDOMEN N/A 12/19/2017    Procedure: LAPAROSCOPY DIAGNOSTIC;  Surgeon: Pilar St MD;  Location: [facility];  Service: Gynecology Oncology    TONSILLECTOMY     [place] [id number] BREAST BIOPSY RIGHT COMPLETE Right 2019       OB History             Para        Term   0            AB        Living           SAB        IAB        Ectopic        Multiple        Live Births                     Family History   Problem Relation Age of Onset    Other Mother         dyslipidemia    Ovarian cancer Mother 48    Lymphoma Father     Bone cancer Maternal Grandfather     No Known Problems Sister     No Known Problems Maternal Grandmother     Uterine cancer Paternal Grandmother     No Known Problems Paternal Grandfather     No Known Problems Maternal Aunt     No Known Problems Maternal Aunt     No Known Problems Maternal Aunt     No Known Problems Maternal Aunt     No Known Problems Paternal Aunt     No Known Problems Paternal Aunt     No Known Problems Paternal Aunt     No Known Problems Paternal Aunt     No Known Problems Brother     No Known Problems Brother        The following portions of the patient's history were reviewed and updated as appropriate: allergies, current medications, past family history, past medical history, past social history, past surgical history and problem list       Objective:    Blood pressure 124/74, pulse 70, temperature (!) 97 4 °F (36 3 °C), resp  rate 17, height 4' 11" (1 499 m), weight 55 3 kg (122 lb), SpO2 99 %, not currently breastfeeding  Body mass index is 24 64 kg/m²  Physical Exam  Vitals reviewed  Constitutional:       General: She is not in acute distress  Appearance: Normal appearance  She is not ill-appearing  HENT:      Head: Normocephalic and atraumatic  Mouth/Throat:      Mouth: Mucous membranes are moist    Eyes:      General: No scleral icterus  Right eye: No discharge  Left eye: No discharge  Conjunctiva/sclera: Conjunctivae normal    Pulmonary:      Effort: Pulmonary effort is normal    Genitourinary:     Comments: External genitalia without abnormality   Vulvovaginal atrophy appreciated  The Bartholin's and Otis's glands are normal  Normal midline urethral meatus  There is no blood, discharge, lesion, or mass visualized in the vagina  Uterus, adnexa, and cervix are surgically absent  No masses or fullness on bimanual exam  Bladder without tenderness  Anus without fissure or lesion  Musculoskeletal:      Right lower leg: No edema  Left lower leg: No edema  Skin:     General: Skin is warm and dry  Coloration: Skin is not jaundiced  Findings: No rash  Neurological:      General: No focal deficit present  Mental Status: She is alert and oriented to person, place, and time  Cranial Nerves: No cranial nerve deficit  Sensory: No sensory deficit  Motor: No weakness  Gait: Gait normal    Psychiatric:         Mood and Affect: Mood normal          Behavior: Behavior normal          Thought Content:  Thought content normal          Judgment: Judgment normal            No results found for:   Lab Results   Component Value Date    WBC 7 36 07/08/2022    HGB 8 7 (L) 07/08/2022    HCT 28 1 (L) 07/08/2022     (H) 07/08/2022     07/08/2022     Lab Results   Component Value Date     10/27/2017    K 3 7 07/08/2022     07/08/2022    CO2 23 07/08/2022    BUN 19 07/08/2022    CREATININE 0 97 07/08/2022    GLUCOSE 219 (H) 12/19/2017    GLUF 100 (H) 07/08/2022    CALCIUM 9 4 07/08/2022    CORRECTEDCA 9 9 07/08/2022    AST 13 07/08/2022    ALT 16 07/08/2022    ALKPHOS 124 (H) 07/08/2022    PROT 6 9 10/27/2017    BILITOT 0 3 10/27/2017    EGFR 61 07/08/2022        Trend:  No results found for:

## 2022-07-22 ENCOUNTER — TELEPHONE (OUTPATIENT)
Dept: CARDIAC SURGERY | Facility: CLINIC | Age: 67
End: 2022-07-22

## 2022-07-22 NOTE — TELEPHONE ENCOUNTER
Pharmacy calling in stating they need a prior authorization for the quantity of the medication enoxaparin

## 2022-07-25 NOTE — PROGRESS NOTES
Outpatient Oncology Nutrition Consult  Type of Consult: Follow Up  Care Location: [facility]   Nutrition Assessment:  Oncology Diagnosis & Treatments:   Endometrial cancer  S/p surgery 12/19/17  Recurrence 7/8/19  S/p chemotherapy (carboplatin/taxol from 7/30/19-1/6/20)  S/p whole pelvis RT (2/4/20- 4/13/20)  Disease progression  ENDOBAR trial started 12/21/20    Oncology History   Endometrial cancer ([facility] [address] )   11/17/2017 Initial Diagnosis    Endometrial cancer ([facility] [address] )     11/17/2017 Biopsy    ENDOMETRIAL BIOPSY: WELL DIFFERENTIATED ENDOMETRIAL ADENOCARCINOMA (FIGO I) WITH FOCALMUCINOUS FEATURES  Part B: ENDOCERVICAL POLYP:BENIGN ENDOCERVICAL      12/19/2017 Surgery    Robotic assisted total laparoscopic hysterectomy with bilateral salpingo-oophorectomy and sentinel bilateral pelvic lymph node dissection  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 (4 4 x 3 2 cm tumor, 9 4/15 4mm invasion, NO LVSI, washings revealed atypical cellular changes)     12/19/2017 Genetic Testing    Morrison testing negative     6/28/2019 Biopsy    A  Breast, Right, US BX Right Breast 1000 4 cmfn:  - Benign breast tissue with focal histiocytic aggregate  See comment   - Negative for atypia and in-situ or invasive carcinoma  7/8/2019 Recurrence    Presented with right lower extremity DVT and CT demonstrating right pelvic sidewall mass with venous, ureteral and nerve compression causing significant neuropathic pain  Biopsy:  Lymph Node, Right pelvic lymph node x3:  - High-grade adenocarcinoma  7/30/2019 - 1/6/2020 Chemotherapy    Taxol 175 mg/m2 and carboplatin AUC 6 every 21 days  Dose was reduced to taxol 135 mg/m2 and carboplatin AUC 5  Completed 6 cycles  Treatment protracted due to multiple hospital admissions       2/24/2020 - 4/13/2020 Radiation    adjuvant external beam radiation therapy to the whole pelvis to 4500 cGy followed by boost to gross disease of an additional 2200 cGy     11/23/2020 Progression    New necrotic adenopathy in the retroperitoneum on CT      12/21/2020 -  Chemotherapy    Began chemotherapy with rucaparib, atezolizumab, and bevacizumab as per [facility] clinical trial        PMH: RNYGB (2001 at [address])    Past Medical History:   Diagnosis Date    Anemia     Chemotherapy follow-up examination     Depression     Endometrial cancer ([address] ) 12/2017    History of chemotherapy     started 12/2021endometrial cancer    Hyperglycemia     vx type 2 dm -- last assessed 4/1/14; resolved 11/7/17    Hyperlipidemia     Hypertension     Obesity     last assessed 10/14/17; resolved 11/7/17     Past Surgical History:   Procedure Laterality Date    ABDOMINAL SURGERY      GASTRIC BYPASS    BREAST BIOPSY Right 06/28/2019    core biopsy; benign    CHOLECYSTECTOMY      at the time of gastric bypass    COLONOSCOPY      CT NEEDLE BIOPSY LYMPH NODE  07/08/2019    FL GUIDED CENTRAL VENOUS ACCESS DEVICE INSERTION  11/12/2019    GASTRIC BYPASS      HYSTERECTOMY Bilateral 2017    total abdominal with salpingo-oophorectomy    IR NEPHROSTOMY TO NEPHROURETERAL STENT  06/09/2022    IR NEPHROSTOMY TUBE CHECK/CHANGE/REPOSITION/REINSERTION/UPSIZE  05/27/2022    IR NEPHROSTOMY TUBE PLACEMENT  07/26/2019    IR NEPHROURETERAL STENT CHECK/CHANGE/REPOSITION  04/06/2021    IR NEPHROURETERAL STENT CHECK/CHANGE/REPOSITION  06/04/2021    IR NEPHROURETERAL STENT CHECK/CHANGE/REPOSITION  09/03/2021    IR NEPHROURETERAL STENT CHECK/CHANGE/REPOSITION  12/07/2021    IR NEPHROURETERAL STENT CHECK/CHANGE/REPOSITION  03/08/2022    IR NEPHROURETERAL STENT CHECK/CHANGE/REPOSITION  05/10/2022    IR PICC LINE  09/27/2019    IR PORT PLACEMENT  07/26/2019    IR PORT REMOVAL  09/20/2019    OOPHORECTOMY Bilateral 2017    LA INSJ TUNNELED CTR VAD W/SUBQ PORT AGE 5 YR/> Left 11/12/2019    Procedure: INSERTION VENOUS PORT ( PORT-A-CATH) IR;  Surgeon: Brad Brown DO;  [facility];  Service: Interventional Radiology    PA LAP, RADICAL HYST W/ TUBE&OV, NODE BX N/A 12/19/2017    Procedure: HYSTERECTOMY LAPAROSCOPIC TOTAL ([address]) W/ ROBOTICS; BILATERAL SALPINGOOPHERECTOMY; LYMPH NODE DISSECTION; lysis of adhesions;  Surgeon: Vicky Diop MD;  Location: [facility];  Service: Gynecology Oncology    PA LAP,DIAGNOSTIC ABDOMEN N/A 12/19/2017    Procedure: LAPAROSCOPY DIAGNOSTIC;  Surgeon: Vicky Diop MD;  [facility];  Service: Gynecology Oncology    TONSILLECTOMY      US GUIDED BREAST BIOPSY RIGHT COMPLETE Right 06/28/2019       Review of Medications:   Vitamins, Supplements and Herbals: yes: Hx RNYGB regimen: Vitamin D, Folic Acid, Align, calcium citrate TID, B12 1000 mcg QD, MVI BID (MVI does not have iron);  Iron 65 mg QD (2 hrs seperate from calcium)    Current Outpatient Medications:     acetaminophen-codeine (TYLENOL #3) 300-30 mg per tablet, Take 1 tablet by mouth every 6 (six) hours as needed for moderate pain (Patient not taking: No sig reported), Disp: 20 tablet, Rfl: 0    ARIPiprazole (ABILIFY) 20 MG tablet, Take 20 mg by mouth in the morning , Disp: , Rfl:     aspirin (ECOTRIN LOW STRENGTH) 81 mg EC tablet, Take 81 mg by mouth daily, Disp: , Rfl:     Calcium-Magnesium-Vitamin D (CALCIUM 500 PO), Take by mouth daily , Disp: , Rfl:     cholecalciferol (VITAMIN D3) 1,000 units tablet, Take 1,000 Units by mouth daily, Disp: , Rfl:     clotrimazole-betamethasone (LOTRISONE) 1-0 05 % cream, Apply topically 2 (two) times a day, Disp: 30 g, Rfl: 0    Cranberry-Vitamin C 250-60 MG CAPS, Take 1 tablet by mouth in the morning, Disp: 90 capsule, Rfl: 3    Cyanocobalamin (VITAMIN B12 PO), Take by mouth daily , Disp: , Rfl:     dronabinol (MARINOL) 2 5 mg capsule, Take 1 capsule (2 5 mg total) by mouth 2 (two) times a day before meals (Patient taking differently: Take 2 5 mg by mouth 2 (two) times a day before meals As needed), Disp: 60 capsule, Rfl: 0    enoxaparin (LOVENOX) 80 mg/0 8 mL, Inject 0 8 mL (80 mg total) under the skin every 24 hours, Disp: 72 mL, Rfl: 1    estradiol (ESTRACE VAGINAL) 0 1 mg/g vaginal cream, Insert 1 g into the vagina daily, Disp: 42 5 g, Rfl: 1    folic acid (FOLVITE) 1 mg tablet, Take 1 tablet (1 mg total) by mouth daily, Disp: , Rfl: 0    gemfibrozil (LOPID) 600 mg tablet, TAKE 1 TABLET BY MOUTH EVERY DAY, Disp: 90 tablet, Rfl: 0    lactulose (CHRONULAC) 10 g/15 mL solution, TAKE 45 ML (30 G TOTAL) BY MOUTH 2 (TWO) TIMES A DAY as needed, Disp: 300 mL, Rfl: 2    LORazepam (ATIVAN) 0 5 mg tablet, Take 1 tablet (0 5 mg total) by mouth every 6 (six) hours as needed for anxiety (or nausea) (Patient not taking: No sig reported), Disp: 36 tablet, Rfl: 1    Multiple Vitamin (MULTIVITAMIN) tablet, Take 1 tablet by mouth daily, Disp: , Rfl:     naloxone (NARCAN) 4 mg/0 1 mL nasal spray, Administer 1 spray into a nostril  If no response after 2-3 minutes, give another dose in the other nostril using a new spray , Disp: 1 each, Rfl: 0    nitrofurantoin (MACROBID) 100 mg capsule, Take 1 capsule (100 mg total) by mouth 2 (two) times a day, Disp: 10 capsule, Rfl: 0    omeprazole (PriLOSEC) 20 mg delayed release capsule, Take 20 mg by mouth daily, Disp: , Rfl:     ondansetron (ZOFRAN) 8 mg tablet, Take 1 tablet (8 mg total) by mouth every 8 (eight) hours as needed for nausea or vomiting, Disp: 30 tablet, Rfl: 1    oxybutynin (DITROPAN XL) 15 MG 24 hr tablet, TAKE 1 TABLET BY MOUTH DAILY AT BEDTIME, Disp: 90 tablet, Rfl: 3    oxyCODONE-acetaminophen (Percocet) 5-325 mg per tablet, Take 1 tablet by mouth every 4 (four) hours as needed for moderate pain Max Daily Amount: 6 tablets, Disp: 30 tablet, Rfl: 0    phenazopyridine (PYRIDIUM) 200 mg tablet, Take 1 tablet (200 mg total) by mouth daily as needed for bladder spasms, Disp: 10 tablet, Rfl: 0    rucaparib (RUBRACA) 300 mg tablet, Take 600 mg by mouth every 12 (twelve) hours Take with or without food    Do not repeat a vomited dose  (Patient not taking: No sig reported), Disp: , Rfl:     saccharomyces boulardii (FLORASTOR) 250 mg capsule, Take 1 capsule (250 mg total) by mouth 2 (two) times a day, Disp: , Rfl: 0    senna (SENOKOT) 8 6 MG tablet, Take 1 tablet (8 6 mg total) by mouth 2 (two) times a day as needed for constipation (Patient not taking: No sig reported), Disp: 60 tablet, Rfl: 0    venlafaxine (EFFEXOR) 75 mg tablet, Take 75 mg by mouth 3 (three) times a day  , Disp: , Rfl:   No current facility-administered medications for this visit      Facility-Administered Medications Ordered in Other Visits:     INV atezolizumab (INVESTIGATIONAL) 1,200 mg in sodium chloride 0 9 % 250 mL infusion, 1,200 mg, Intravenous, Once, DANIEL Valles-WALDEMAR    INV bevacizumab (INVESTIGATIONAL) 858 mg in sodium chloride 0 9 % 65 68 mL IVPB, 858 mg, Intravenous, Once, Luli Busby PA-C    sodium chloride 0 9 % infusion, 20 mL/hr, Intravenous, Continuous, Jin Figueroa PA-C    Most Recent Lab Results:  Lab Results   Component Value Date    WBC 5 64 07/29/2022    IRON 92 11/19/2021    TIBC 374 11/19/2021    FERRITIN 1,429 (H) 11/19/2021    CHOLESTEROL 174 07/29/2022    CHOL 183 10/27/2017    TRIG 87 04/15/2022    HDL 72 04/15/2022    LDLCALC 100 04/15/2022    ALT 13 07/29/2022    AST 11 07/29/2022    ALB 3 2 (L) 07/29/2022     10/27/2017     05/12/2017    SODIUM 140 07/29/2022    SODIUM 138 07/08/2022    K 3 7 07/29/2022    K 3 7 07/08/2022     (H) 07/29/2022    BUN 21 07/29/2022    BUN 19 07/08/2022    CREATININE 0 92 07/29/2022    CREATININE 0 97 07/08/2022    EGFR 66 07/29/2022    PHOS 4 1 07/29/2022    PHOS 3 7 07/08/2022    GLUCOSE 219 (H) 12/19/2017    POCGLU 97 08/18/2020    GLUF 100 (H) 07/08/2022    GLUF 95 05/06/2022    GLUC 74 07/29/2022    HGBA1C 4 8 07/07/2021    HGBA1C 5 1 02/03/2020    HGBA1C 6 2 08/28/2019    CALCIUM 8 8 07/29/2022    FOLATE 5 4 10/06/2019    MG 2 0 07/29/2022     Anthropometric Measurements:   Height: 59"  Ht Readings from Last 1 Encounters:   08/01/22 4' 11" (1 499 m)     Wt Readings from Last 20 Encounters:   08/01/22 58 1 kg (128 lb)   07/29/22 57 2 kg (126 lb)   07/27/22 55 3 kg (122 lb)   07/21/22 55 3 kg (122 lb)   07/12/22 54 kg (119 lb)   07/11/22 54 kg (119 lb)   07/08/22 53 5 kg (118 lb)   06/20/22 51 7 kg (114 lb)   06/17/22 51 7 kg (114 lb)   06/09/22 53 5 kg (118 lb)   06/07/22 53 5 kg (118 lb)   05/31/22 53 9 kg (118 lb 13 3 oz)   05/27/22 53 5 kg (117 lb 15 1 oz)   05/27/22 53 5 kg (118 lb)   05/18/22 54 9 kg (121 lb)   05/10/22 55 8 kg (123 lb)   05/09/22 56 kg (123 lb 8 oz)   05/06/22 55 1 kg (121 lb 8 oz)   04/18/22 55 3 kg (122 lb)   04/15/22 55 3 kg (122 lb)     Weight Hx:  Usual Weight: 270# before RNYGB in 2001; lowest post-op wt: 200#; wt typically fluctuates between 215-230# (prior to wt loss)  Varian: (2/25/20) 185 6#, (3/3/20) 188 6#, (3/10/20) 187 2#, (3/19/20) 186 2#, (3/24/20) 187#, (3/31/20) 185 4#, (4/10/20) 184 4#    Home Scale Wts: (2/19/20) 185#, (8/3/20) 194#    Oncology Nutrition-Anthropometrics    Flowsheet Row Nutrition from 8/1/2022 in [place] [address] [facility] Nutrition from 7/11/2022 in [place] [address] [facility]   Patient [age] (years): 61 years 61 years   Patient (female) height (in): 61 in 61 in   Current Weight to be used for anthropometric calculations (lbs) 128 lbs 119 lbs   Current Weight to be used for anthropometric calculations (kg) 58 2 kg 54 1 kg   BMI: 25 9 24   IBW female: 95 lbs 95 lbs   IBW (kg) female: 43 2 kg 43 2 kg   IBW % (female) 134 7 % 125 3 %   Adjusted BW (female): 103 3 lbs 101 lbs   Adjusted BW kg (female): 47 kg 45 9 kg   % weight change after 1 month: 8 5 % 0 8 %   Weight change after 1 month (lbs) 10 lbs 1 lbs   % weight change after 3 months: 5 3 % -2 5 %   Weight change after 3 months (lbs) 6 5 lbs -3 lbs   % weight change after 6 months: -5 6 % -10 5 %   Weight change after 6 months (lbs) -7 6 lbs -14 lbs   % weight change after 1 year: -20 5 % -27 %         Nutrition-Focused Physical Findings:  moderate muscle depletion ([place]) - hollowing/scooping/depression and moderate body fat depletion ([place]) - hollowing/depression/dark circles    Food/Nutrition-Related History & Client/Social History:    Current Nutrition Impact Symptoms:   [] Nausea  [] Reduced Appetite  [] Acid Reflux   [] Vomiting  [x] Unintended Wt Loss - improved (significant gain x1 month) d/t stopping oral chemo 3 weeks ago, pt reports she will starting it again today  -significant loss x 1 year  -pt does not want to regain wt she lost [x] Malabsorption - S/p RNYGB in 2001   [] Diarrhea  [] Unintended Wt Gain  [] Dumping Syndrome   [] Constipation - denies  -On bowel protocol [] Thick Mucous/Secretions  [] Abdominal Pain    [] Dysgeusia (Altered Taste) [x] Xerostomia (Dry Mouth) - comes and goes  -uses bs/sw rinses and Biotene [] Gas    [] Dysosmia (Altered Smell)  [] Thrush  [] Difficulty Chewing    [x] Oral Mucositis (Sore Mouth) - mouth sores come/go; sensitive inner cheeks d/t biting cheeks during the night  -using biotene products + bs/sw rinses [] Fatigue - much better since break from oral chemo [] Pain:   [] Odynophagia   [] Esophagitis  [] Other:    [] Dysphagia [] Early Satiety  [] No Problems Eating      Food Allergies & Intolerances: yes: had skin patch testing which showed allergy to strawberries, but says she is able to eat strawberries without any side effects    Current Diet: Small Frequent Meals  Current Nutrition Intake: Increased since last visit   Appetite: Good   Nutrition Route: PO   Oral Care: Biotene toothpaste, rinse and spray, bs/sw rinses  Activity level: Sedentary  Limited by pain and medical condition       24 Hr Diet Recall: tried bean salad since last visit  Breakfast: tried to eat a sausage biscuit but it did not taste right (ate too early this AM); yesterday: 2 Eggo waffles with butter and jelly  Snack: 1/4 cup cookie dough ice cream  Lunch: 1/2 Asian chicken salad from [employer] and 3/4 of a grilled chicken sandwich from [employer]  Snack: 1/2 ham and cheese sandwich with wise  Dinner: 2 pc cheese pizza (ate late)  Snack: cookie dough ice cream    Beverages: water (16 9 oz x2), sweetened iced tea (32 oz x1 + 24 oz x1)   -Does not drink coffee or alcohol   -Does not separate fluids from solids, does not have discomfort while eating and drinking at the same time  Supplements:   Premier Protein Shake (11 oz, 160 kcal, 30 g pro) - none lately   Unjury protein powder - occasionally adds to foods such as soup    Oncology Nutrition-Estimated Needs    Flowsheet Row Nutrition from 3/28/2022 in [place] [address] [facility] Nutrition from 10/11/2021 in [place] [address] [facility]   Weight type used Actual weight Adjusted weight   Weight in kilograms (kg) used for estimated needs 54 1 kg 49 5 kg   Energy needs formula:  35-40 kcal/kg 35-40 kcal/kg   Energy needs based on 35 kcal/k kcal 1733 kcal   Energy needs based on 40 kcal/k kcal 1980 kcal   Protein needs formula: 1 5-2 g/kg 1 5-2 g/kg   Protein needs based on 1 5 g/kg 81 g 74 g   Protein needs based on 2 g/kg 108 g 99 g   Fluid needs formula: 30-35 mL/kg 30-35 mL/kg   Fluid needs based on 30 mL/kg 1623 mL 1485 mL   Fluid needs in ounces 55 oz 50 oz   Fluid needs based on 35 mL/kg 1894 mL 1733 mL   Fluid needs in ounces 64 oz 59 oz        Discussion & Intervention:    Ragini was evaluated today for an RD follow up regarding wt loss and pt request for dietitian involvement throughout tx  Ragini is currently undergoing tx for endometrial cancer  Ragini is doing well today  She had a significant wt gain over the past month  She says she stopped her oral chemo 3 weeks ago and she has been eating much more    She will be restarting her oral chemo today  She does not want to regain the wt that she lost  She plans to return to small/frequent protein-containing meals and drinking Premier protein shakes daily in anticipation of reduced po intake now that her oral chemo will be resuming  Reviewed 24 hour recall, which revealed an adequate po intake, and discussed ways to increase kcal, protein, and fluid intakes and optimize nutrient intake  Also reviewed the importance of wt management throughout the tx process and the role of a high kcal/ high protein diet in managing wt and overall health  Based on today's assessment, discussion included: MNT for: xerostomia, sore mouth, hx wt loss, a high kcal/protein diet & food choices to include at all meals & snacks (Examples of high kcal foods: cheese, full-fat dairy products, nut butter, plant-based fats, coconut oil/milk, avocado, butter, cream soup, etc  Examples of high protein foods: eggs, chicken, fish, beans/legumes, nuts/nut butters, bone broth, etc ) , adequate hydration & fluid choices, utilizing oral nutrition supplements and recipe suggestions/resources, trying new recipes, & cooking at home more often  Moving forward, Ragini was encouraged to increase kcal, protein, and fluid intakes  She does best with self-directed goals and has chosen goals for herself moving forward; RD guidance provided during goal setting  Materials Provided: not applicable  All questions and concerns addressed during todays visit  Ragini has RD contact information  Nutrition Diagnosis:  Increased Nutrient Needs (kcal & pro) related to increased demand for nutrients and disease state as evidenced by cancer dx and pt undergoing tx for cancer         Monitoring & Evaluation:   Goals:  weight maintenance/stabilization  adequate nutrition impact symptom management  pt to meet >/=75% estimated nutrition needs daily     Progress Towards Goals: Progressing and Partially Met    Nutrition Rx & Recommendations:  Diet: High Calorie, High Protein (for high calorie foods see pages 52-53, and for high protein foods see pages 49-51 in your Eating Hints book)  Small, frequent meals/snacks may be easier to tolerate than 3 large daily meals  Aim for 5-6 small meals per day (every 2-3 hours)  Include protein at all meals/snacks  Follow proper oral care; Try baking soda/salt water rinse recipe (mix 3/4 tsp salt + 1 tsp baking soda + 1 qt water; rinse with plain water after using) in Eating Hints book (pg 18)  Brush your teeth before/after meals & before bed  For sore mouth/throat: choose foods that are easy to chew/swallow; cook foods until they are soft/tender; moisten & soften foods (gravy/sauce/broth/yogurt); cut food into small pieces; drink with a straw (as tolerated); eat with a very small spoon; eat cold or room temperature food; suck on ice chips; Avoid citrus, spicy foods, tomatoes/ketchup, salty foods, raw vegetables, sharp/crunchy/hard foods & alcohol (see pages 23-28 in your Eating Hints book)  For dry mouth: sip water throughout the day; try very sweet (or tart, as tolerated) drinks; chew gum or suck on hard candy, popsicles, & ice chips; eat easy to swallow foods (such as pureed cooked foods or soups); moisten food with sauce/gravy/salad dressing; do not drink beer, wine, or any type of alcohol; keep lips moist with lip balm; avoid tobacco products & second-hand smoke; try Biotene as needed (see pages 17-18 in your Eating Hints book)  Follow proper oral care; Try baking soda/salt water rinse recipe (mix 3/4 tsp salt + 1 tsp baking soda + 1 qt water; rinse with pain water after using) in Eating Hints book (pg 18)  Brush your teeth before/after meals & before bed  Weigh yourself regularly  If you notice weight loss, make an effort to increase your daily food/calorie intake  If you continue to notice loss after these efforts, reach out to your dietitian to establish a plan to stabilize weight     Snack ideas: [place] yogurt with fruit, pudding, veggies with humus OR try greek yogurt based vegetable dip, peanut butter, grapes and cheese, fruit with a cheese stick , cottage cheese, soup, unjury, protein shakes/smoothies  Continue to keep a food journal as long as it is helpful    Patient choosen goals:  6 small, protein-containing meals  Try to cook a meal 1X/week to limit eating out; use simple recipes; try the [employer] com website for recipe suggestions   Nutrition Supplements:    Re-start Premier Protein Shake 1X/day in the morning    Follow Up Plan: 8/22 during infusion   Recommend Referral to Other Providers: none at this time

## 2022-07-26 DIAGNOSIS — C54.1 ENDOMETRIAL CANCER (HCC): Primary | ICD-10-CM

## 2022-07-27 ENCOUNTER — HOSPITAL ENCOUNTER (OUTPATIENT)
Dept: RADIOLOGY | Age: 67
Discharge: HOME | End: 2022-07-27
Payer: COMMERCIAL

## 2022-07-27 VITALS — HEIGHT: 59 IN | WEIGHT: 122 LBS | BODY MASS INDEX: 24.6 KG/M2

## 2022-07-27 DIAGNOSIS — Z12.39 ENCOUNTER FOR SCREENING FOR MALIGNANT NEOPLASM OF BREAST, UNSPECIFIED SCREENING MODALITY: ICD-10-CM

## 2022-07-27 PROCEDURE — 77067 SCR MAMMO BI INCL CAD: CPT

## 2022-07-27 PROCEDURE — 77063 BREAST TOMOSYNTHESIS BI: CPT

## 2022-07-29 ENCOUNTER — DOCUMENTATION (OUTPATIENT)
Dept: OTHER | Facility: HOSPITAL | Age: 67
End: 2022-07-29

## 2022-07-29 ENCOUNTER — HOSPITAL ENCOUNTER (OUTPATIENT)
Dept: INFUSION CENTER | Facility: HOSPITAL | Age: 67
Discharge: HOME | End: 2022-07-29
Payer: COMMERCIAL

## 2022-07-29 ENCOUNTER — OFFICE VISIT (OUTPATIENT)
Dept: GYNECOLOGIC ONCOLOGY | Facility: CLINIC | Age: 67
End: 2022-07-29
Payer: MEDICARE

## 2022-07-29 VITALS
TEMPERATURE: 97.7 F | WEIGHT: 126 LBS | BODY MASS INDEX: 25.4 KG/M2 | DIASTOLIC BLOOD PRESSURE: 74 MMHG | SYSTOLIC BLOOD PRESSURE: 118 MMHG | HEIGHT: 59 IN

## 2022-07-29 DIAGNOSIS — C54.1 ENDOMETRIAL CANCER (HCC): Primary | Chronic | ICD-10-CM

## 2022-07-29 DIAGNOSIS — I82.4Y1 ACUTE DEEP VEIN THROMBOSIS (DVT) OF PROXIMAL VEIN OF RIGHT LOWER EXTREMITY (HCC): ICD-10-CM

## 2022-07-29 DIAGNOSIS — C54.1 ENDOMETRIAL CANCER (HCC): Primary | ICD-10-CM

## 2022-07-29 DIAGNOSIS — N95.2 VAGINAL ATROPHY: ICD-10-CM

## 2022-07-29 LAB
ALBUMIN SERPL BCP-MCNC: 3.2 G/DL (ref 3.5–5)
ALP SERPL-CCNC: 106 U/L (ref 46–116)
ALT SERPL W P-5'-P-CCNC: 13 U/L (ref 12–78)
AMYLASE SERPL-CCNC: 32 IU/L (ref 25–115)
ANION GAP SERPL CALCULATED.3IONS-SCNC: 6 MMOL/L (ref 4–13)
APTT PPP: 35 SECONDS (ref 23–37)
AST SERPL W P-5'-P-CCNC: 11 U/L (ref 5–45)
BASOPHILS # BLD AUTO: 0.03 THOUSANDS/ΜL (ref 0–0.1)
BASOPHILS NFR BLD AUTO: 1 % (ref 0–1)
BILIRUB SERPL-MCNC: 0.2 MG/DL (ref 0.2–1)
BUN SERPL-MCNC: 21 MG/DL (ref 5–25)
CALCIUM ALBUM COR SERPL-MCNC: 9.4 MG/DL (ref 8.3–10.1)
CALCIUM SERPL-MCNC: 8.8 MG/DL (ref 8.3–10.1)
CHLORIDE SERPL-SCNC: 111 MMOL/L (ref 96–108)
CHOLEST SERPL-MCNC: 174 MG/DL
CO2 SERPL-SCNC: 23 MMOL/L (ref 21–32)
CREAT SERPL-MCNC: 0.92 MG/DL (ref 0.6–1.3)
EOSINOPHIL # BLD AUTO: 0.06 THOUSAND/ΜL (ref 0–0.61)
EOSINOPHIL NFR BLD AUTO: 1 % (ref 0–6)
ERYTHROCYTE [DISTWIDTH] IN BLOOD BY AUTOMATED COUNT: 14.4 % (ref 11.6–15.1)
GFR SERPL CREATININE-BSD FRML MDRD: 66 ML/MIN/1.73SQ M
GGT SERPL-CCNC: <7 U/L (ref 5–85)
GLUCOSE SERPL-MCNC: 74 MG/DL (ref 65–140)
HCT VFR BLD AUTO: 31.7 % (ref 34.8–46.1)
HGB BLD-MCNC: 9.7 G/DL (ref 11.5–15.4)
IMM GRANULOCYTES # BLD AUTO: 0.06 THOUSAND/UL (ref 0–0.2)
IMM GRANULOCYTES NFR BLD AUTO: 1 % (ref 0–2)
INR PPP: 0.91 (ref 0.84–1.19)
LIPASE SERPL-CCNC: 84 U/L (ref 73–393)
LYMPHOCYTES # BLD AUTO: 0.56 THOUSANDS/ΜL (ref 0.6–4.47)
LYMPHOCYTES NFR BLD AUTO: 10 % (ref 14–44)
MAGNESIUM SERPL-MCNC: 2 MG/DL (ref 1.6–2.6)
MCH RBC QN AUTO: 32.2 PG (ref 26.8–34.3)
MCHC RBC AUTO-ENTMCNC: 30.6 G/DL (ref 31.4–37.4)
MCV RBC AUTO: 105 FL (ref 82–98)
MONOCYTES # BLD AUTO: 0.67 THOUSAND/ΜL (ref 0.17–1.22)
MONOCYTES NFR BLD AUTO: 12 % (ref 4–12)
NEUTROPHILS # BLD AUTO: 4.26 THOUSANDS/ΜL (ref 1.85–7.62)
NEUTS SEG NFR BLD AUTO: 75 % (ref 43–75)
NRBC BLD AUTO-RTO: 0 /100 WBCS
PHOSPHATE SERPL-MCNC: 4.1 MG/DL (ref 2.3–4.1)
PLATELET # BLD AUTO: 295 THOUSANDS/UL (ref 149–390)
PMV BLD AUTO: 10.6 FL (ref 8.9–12.7)
POTASSIUM SERPL-SCNC: 3.7 MMOL/L (ref 3.5–5.3)
PROT SERPL-MCNC: 7.2 G/DL (ref 6.4–8.4)
PROTHROMBIN TIME: 12.4 SECONDS (ref 11.6–14.5)
RBC # BLD AUTO: 3.01 MILLION/UL (ref 3.81–5.12)
SODIUM SERPL-SCNC: 140 MMOL/L (ref 135–147)
T3 SERPL-MCNC: 0.9 NG/ML (ref 0.6–1.8)
T4 FREE SERPL-MCNC: 0.84 NG/DL (ref 0.76–1.46)
TSH SERPL DL<=0.05 MIU/L-ACNC: 1.14 UIU/ML (ref 0.45–4.5)
WBC # BLD AUTO: 5.64 THOUSAND/UL (ref 4.31–10.16)

## 2022-07-29 PROCEDURE — 83735 ASSAY OF MAGNESIUM: CPT

## 2022-07-29 PROCEDURE — 85730 THROMBOPLASTIN TIME PARTIAL: CPT

## 2022-07-29 PROCEDURE — 85610 PROTHROMBIN TIME: CPT

## 2022-07-29 PROCEDURE — 84439 ASSAY OF FREE THYROXINE: CPT

## 2022-07-29 PROCEDURE — 84480 ASSAY TRIIODOTHYRONINE (T3): CPT

## 2022-07-29 PROCEDURE — 82977 ASSAY OF GGT: CPT

## 2022-07-29 PROCEDURE — 85025 COMPLETE CBC W/AUTO DIFF WBC: CPT

## 2022-07-29 PROCEDURE — 84443 ASSAY THYROID STIM HORMONE: CPT

## 2022-07-29 PROCEDURE — 84100 ASSAY OF PHOSPHORUS: CPT

## 2022-07-29 PROCEDURE — 82150 ASSAY OF AMYLASE: CPT

## 2022-07-29 PROCEDURE — 82465 ASSAY BLD/SERUM CHOLESTEROL: CPT

## 2022-07-29 PROCEDURE — 80053 COMPREHEN METABOLIC PANEL: CPT

## 2022-07-29 PROCEDURE — 99215 OFFICE O/P EST HI 40 MIN: CPT | Performed by: PHYSICIAN ASSISTANT

## 2022-07-29 PROCEDURE — 83690 ASSAY OF LIPASE: CPT

## 2022-07-29 RX ORDER — SODIUM CHLORIDE 9 MG/ML
20 INJECTION, SOLUTION INTRAVENOUS CONTINUOUS
Status: DISCONTINUED | OUTPATIENT
Start: 2022-08-01 | End: 2022-08-04 | Stop reason: HOSPADM

## 2022-07-29 RX ORDER — ENOXAPARIN SODIUM 100 MG/ML
INJECTION SUBCUTANEOUS
Qty: 22.4 ML | Refills: 5 | Status: SHIPPED | OUTPATIENT
Start: 2022-07-29 | End: 2022-08-01 | Stop reason: SDUPTHER

## 2022-07-29 NOTE — PROGRESS NOTES
Ragini Melendez (1955) is patient#[id number] on the [facility] clinical trial  Pt was seen for pre-treatment office visit and physical exam with Saint Cherry PA-C on 29-Jul-2022  AEs and ConMeds were reviewed with pt  She is eating well and gaining weight  Mouth sores are minimal  She is experiencing less fatigue, and her Hgb has increased to 9 7  We will restart the pt back on oral Rucaparib beginning 01-Aug-2022  Labs from 29-Jul-2022 were reviewed and signed by Saint Cherry  Per protocol, pt is okay to proceed with treatment for cycle 29 day 1 scheduled 01-Aug-2022  Pt will call with any questions or concerns

## 2022-07-29 NOTE — PROGRESS NOTES
Assessment/Plan:    Problem List Items Addressed This Visit        Cardiovascular and Mediastinum    Acute deep vein thrombosis (DVT) of proximal vein of lower extremity (HCC)    Relevant Medications    enoxaparin (LOVENOX) 80 mg/0 8 mL       Genitourinary    Endometrial cancer (HCC) - Primary (Chronic)     Recurrent stage IB endometrial cancer currently on ENDOBARR clinical trial presents  She has received 28 cycles  Rucaparib was held with cycle 28 d/t treatment-related fatigue and anemia  Her fatigue and anemia has improved       Labs from 7/29/22 reviewed  Proceed with next cycle of treatment as scheduled  Will restart rucaparib      CT imaging per protocol scheduled August       Return to the office per protocol           Vaginal atrophy     Improved with topical estrogen  CHIEF COMPLAINT:   F/u on clinical trial, pre-cycle 29    Problem:  Cancer Staging  Endometrial cancer [facility])  Staging form: Corpus Uteri - Carcinoma, AJCC 7th Edition  - Clinical stage from 12/19/2017: FIGO Stage IB (T1b, N0, M0) - Signed by Alyssa Winters MD on 2/12/2018        Previous therapy:  Oncology History   Endometrial cancer ([facility] [address] )   11/17/2017 Initial Diagnosis    Endometrial cancer ([facility] [address] )     11/17/2017 Biopsy    ENDOMETRIAL BIOPSY: WELL DIFFERENTIATED ENDOMETRIAL ADENOCARCINOMA (FIGO I) WITH FOCALMUCINOUS FEATURES  Part B: ENDOCERVICAL POLYP:BENIGN ENDOCERVICAL      12/19/2017 Surgery    Robotic assisted total laparoscopic hysterectomy with bilateral salpingo-oophorectomy and sentinel bilateral pelvic lymph node dissection  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 (4 4 x 3 2 cm tumor, 9 4/15 4mm invasion, NO LVSI, washings revealed atypical cellular changes)     12/19/2017 Genetic Testing    Morrison testing negative     6/28/2019 Biopsy    A  Breast, Right, US BX Right Breast 1000 4 cmfn:  - Benign breast tissue with focal histiocytic aggregate    See comment   - Negative for atypia and in-situ or invasive carcinoma  7/8/2019 Recurrence    Presented with right lower extremity DVT and CT demonstrating right pelvic sidewall mass with venous, ureteral and nerve compression causing significant neuropathic pain  Biopsy:  Lymph Node, Right pelvic lymph node x3:  - High-grade adenocarcinoma  7/30/2019 - 1/6/2020 Chemotherapy    Taxol 175 mg/m2 and carboplatin AUC 6 every 21 days  Dose was reduced to taxol 135 mg/m2 and carboplatin AUC 5  Completed 6 cycles  Treatment protracted due to multiple hospital admissions  2/24/2020 - 4/13/2020 Radiation    adjuvant external beam radiation therapy to the whole pelvis to 4500 cGy followed by boost to gross disease of an additional 2200 cGy     11/23/2020 Progression    New necrotic adenopathy in the retroperitoneum on CT      12/21/2020 -  Chemotherapy    Began chemotherapy with rucaparib, atezolizumab, and bevacizumab as per [facility] clinical trial            Patient ID: Juan Jo is a 61 y o  female  who [facility]  Overall, she continues to tolerate treatment well  She has been afebrile  The patient notes improvement in her fatigue with this last cycle of treatment  She denies n/v/abdominal pain  Appetite is appropriate  She notes normal bowel function, without bloody diarrhea  She denies new/worsening cough/SOB or skin rash  Protocol labs from 7/29/22 reviewed  The following portions of the patient's history were reviewed and updated as appropriate: allergies, current medications, past medical history, past surgical history and problem list     Review of Systems   Constitutional: Positive for fatigue  Negative for fever  HENT: Negative  Eyes: Negative  Respiratory: Negative  Cardiovascular: Negative  Gastrointestinal: Negative  Genitourinary:        As per HPI  Musculoskeletal: Negative  Skin: Negative  Neurological: Negative  Psychiatric/Behavioral: Negative  Current Outpatient Medications   Medication Sig Dispense Refill    acetaminophen-codeine (TYLENOL #3) 300-30 mg per tablet Take 1 tablet by mouth every 6 (six) hours as needed for moderate pain (Patient not taking: No sig reported) 20 tablet 0    ARIPiprazole (ABILIFY) 20 MG tablet Take 20 mg by mouth in the morning   aspirin (ECOTRIN LOW STRENGTH) 81 mg EC tablet Take 81 mg by mouth daily      Calcium-Magnesium-Vitamin D (CALCIUM 500 PO) Take by mouth daily       cholecalciferol (VITAMIN D3) 1,000 units tablet Take 1,000 Units by mouth daily      clotrimazole-betamethasone (LOTRISONE) 1-0 05 % cream Apply topically 2 (two) times a day 30 g 0    Cranberry-Vitamin C 250-60 MG CAPS Take 1 tablet by mouth in the morning 90 capsule 3    Cyanocobalamin (VITAMIN B12 PO) Take by mouth daily       dronabinol (MARINOL) 2 5 mg capsule Take 1 capsule (2 5 mg total) by mouth 2 (two) times a day before meals (Patient taking differently: Take 2 5 mg by mouth 2 (two) times a day before meals As needed) 60 capsule 0    enoxaparin (LOVENOX) 80 mg/0 8 mL Inject 0 8 mL (80 mg total) under the skin every 24 hours 30 mL 5    estradiol (ESTRACE VAGINAL) 0 1 mg/g vaginal cream Insert 1 g into the vagina daily 83 4 g 1    folic acid (FOLVITE) 1 mg tablet Take 1 tablet (1 mg total) by mouth daily  0    gemfibrozil (LOPID) 600 mg tablet TAKE 1 TABLET BY MOUTH EVERY DAY 90 tablet 0    lactulose (CHRONULAC) 10 g/15 mL solution TAKE 45 ML (30 G TOTAL) BY MOUTH 2 (TWO) TIMES A DAY as needed 300 mL 2    LORazepam (ATIVAN) 0 5 mg tablet Take 1 tablet (0 5 mg total) by mouth every 6 (six) hours as needed for anxiety (or nausea) (Patient not taking: No sig reported) 36 tablet 1    Multiple Vitamin (MULTIVITAMIN) tablet Take 1 tablet by mouth daily      naloxone (NARCAN) 4 mg/0 1 mL nasal spray Administer 1 spray into a nostril   If no response after 2-3 minutes, give another dose in the other nostril using a new spray  1 each 0    nitrofurantoin (MACROBID) 100 mg capsule Take 1 capsule (100 mg total) by mouth 2 (two) times a day 10 capsule 0    omeprazole (PriLOSEC) 20 mg delayed release capsule Take 20 mg by mouth daily      ondansetron (ZOFRAN) 8 mg tablet Take 1 tablet (8 mg total) by mouth every 8 (eight) hours as needed for nausea or vomiting 30 tablet 1    oxybutynin (DITROPAN XL) 15 MG 24 hr tablet TAKE 1 TABLET BY MOUTH DAILY AT BEDTIME 90 tablet 3    oxyCODONE-acetaminophen (Percocet) 5-325 mg per tablet Take 1 tablet by mouth every 4 (four) hours as needed for moderate pain Max Daily Amount: 6 tablets 30 tablet 0    phenazopyridine (PYRIDIUM) 200 mg tablet Take 1 tablet (200 mg total) by mouth daily as needed for bladder spasms 10 tablet 0    rucaparib (RUBRACA) 300 mg tablet Take 600 mg by mouth every 12 (twelve) hours Take with or without food  Do not repeat a vomited dose  (Patient not taking: No sig reported)      saccharomyces boulardii (FLORASTOR) 250 mg capsule Take 1 capsule (250 mg total) by mouth 2 (two) times a day  0    senna (SENOKOT) 8 6 MG tablet Take 1 tablet (8 6 mg total) by mouth 2 (two) times a day as needed for constipation (Patient not taking: No sig reported) 60 tablet 0    venlafaxine (EFFEXOR) 75 mg tablet Take 75 mg by mouth 3 (three) times a day         No current facility-administered medications for this visit  Objective:    Blood pressure 118/74, temperature 97 7 °F (36 5 °C), temperature source Tympanic, height 4' 11" (1 499 m), weight 57 2 kg (126 lb), not currently breastfeeding  Body mass index is 25 45 kg/m²  Body surface area is 1 52 meters squared  Physical Exam  Vitals reviewed  Constitutional:       General: She is not in acute distress  Appearance: Normal appearance  She is not ill-appearing  HENT:      Head: Normocephalic and atraumatic        Mouth/Throat:      Mouth: Mucous membranes are moist    Eyes: General: No scleral icterus  Right eye: No discharge  Left eye: No discharge  Conjunctiva/sclera: Conjunctivae normal    Pulmonary:      Effort: Pulmonary effort is normal    Musculoskeletal:      Right lower leg: No edema  Left lower leg: No edema  Skin:     General: Skin is warm and dry  Coloration: Skin is not jaundiced  Findings: No rash  Neurological:      General: No focal deficit present  Mental Status: She is alert and oriented to person, place, and time  Cranial Nerves: No cranial nerve deficit  Sensory: No sensory deficit  Motor: No weakness  Gait: Gait normal    Psychiatric:         Mood and Affect: Mood normal          Behavior: Behavior normal          Thought Content: Thought content normal          Judgment: Judgment normal      Performance status is one         Lab Results   Component Value Date     10/27/2017    K 3 7 07/29/2022     (H) 07/29/2022    CO2 23 07/29/2022    BUN 21 07/29/2022    CREATININE 0 92 07/29/2022    GLUCOSE 219 (H) 12/19/2017    GLUF 100 (H) 07/08/2022    CALCIUM 8 8 07/29/2022    CORRECTEDCA 9 4 07/29/2022    AST 11 07/29/2022    ALT 13 07/29/2022    ALKPHOS 106 07/29/2022    PROT 6 9 10/27/2017    BILITOT 0 3 10/27/2017    EGFR 66 07/29/2022     Lab Results   Component Value Date    WBC 5 64 07/29/2022    HGB 9 7 (L) 07/29/2022    HCT 31 7 (L) 07/29/2022     (H) 07/29/2022     07/29/2022     Lab Results   Component Value Date    NEUTROABS 4 26 07/29/2022

## 2022-07-29 NOTE — ASSESSMENT & PLAN NOTE
Recurrent stage IB endometrial cancer currently on ENDOBARR clinical trial presents  She has received 28 cycles  Rucaparib was held with cycle 28 d/t treatment-related fatigue and anemia  Her fatigue and anemia has improved       Labs from 7/29/22 reviewed  Proceed with next cycle of treatment as scheduled   Will restart rucaparib      CT imaging per protocol scheduled August       Return to the office per protocol

## 2022-07-29 NOTE — PROGRESS NOTES
Patient presented to infusion center for lab draw via port  Labs collected without incident  Patient declined to provide urine specimen during this appointment, stated she would collect at home and drop off to lab at another time  Sterile urine cup provided   Declined AVS

## 2022-07-30 ENCOUNTER — APPOINTMENT (OUTPATIENT)
Dept: LAB | Facility: CLINIC | Age: 67
End: 2022-07-30
Payer: COMMERCIAL

## 2022-07-30 LAB
BACTERIA UR QL AUTO: ABNORMAL /HPF
BILIRUB UR QL STRIP: NEGATIVE
CLARITY UR: ABNORMAL
COLOR UR: COLORLESS
CREAT UR-MCNC: 17.1 MG/DL
GLUCOSE UR STRIP-MCNC: NEGATIVE MG/DL
HGB UR QL STRIP.AUTO: ABNORMAL
KETONES UR STRIP-MCNC: NEGATIVE MG/DL
LEUKOCYTE ESTERASE UR QL STRIP: ABNORMAL
MUCOUS THREADS UR QL AUTO: ABNORMAL
NITRITE UR QL STRIP: NEGATIVE
NON-SQ EPI CELLS URNS QL MICRO: ABNORMAL /HPF
PH UR STRIP.AUTO: 5.5 [PH]
PROT UR STRIP-MCNC: NEGATIVE MG/DL
PROT UR-MCNC: 32 MG/DL
PROT/CREAT UR: 1.87 MG/G{CREAT} (ref 0–0.1)
RBC #/AREA URNS AUTO: ABNORMAL /HPF
SP GR UR STRIP.AUTO: 1 (ref 1–1.03)
UROBILINOGEN UR STRIP-ACNC: <2 MG/DL
WBC #/AREA URNS AUTO: ABNORMAL /HPF
WBC CLUMPS # UR AUTO: PRESENT /UL

## 2022-07-30 PROCEDURE — 82570 ASSAY OF URINE CREATININE: CPT

## 2022-07-30 PROCEDURE — 84156 ASSAY OF PROTEIN URINE: CPT

## 2022-07-30 PROCEDURE — 81001 URINALYSIS AUTO W/SCOPE: CPT

## 2022-08-01 ENCOUNTER — TELEPHONE (OUTPATIENT)
Dept: GYNECOLOGIC ONCOLOGY | Facility: CLINIC | Age: 67
End: 2022-08-01

## 2022-08-01 ENCOUNTER — HOSPITAL ENCOUNTER (OUTPATIENT)
Dept: INFUSION CENTER | Facility: CLINIC | Age: 67
Discharge: HOME | End: 2022-08-01
Payer: COMMERCIAL

## 2022-08-01 ENCOUNTER — NUTRITION (OUTPATIENT)
Dept: NUTRITION | Facility: CLINIC | Age: 67
End: 2022-08-01

## 2022-08-01 ENCOUNTER — DOCUMENTATION (OUTPATIENT)
Dept: OTHER | Facility: HOSPITAL | Age: 67
End: 2022-08-01

## 2022-08-01 VITALS
HEART RATE: 65 BPM | DIASTOLIC BLOOD PRESSURE: 73 MMHG | WEIGHT: 128 LBS | HEIGHT: 59 IN | TEMPERATURE: 97 F | BODY MASS INDEX: 25.8 KG/M2 | OXYGEN SATURATION: 98 % | RESPIRATION RATE: 14 BRPM | SYSTOLIC BLOOD PRESSURE: 111 MMHG

## 2022-08-01 DIAGNOSIS — Z71.3 NUTRITIONAL COUNSELING: Primary | ICD-10-CM

## 2022-08-01 DIAGNOSIS — I82.4Y1 ACUTE DEEP VEIN THROMBOSIS (DVT) OF PROXIMAL VEIN OF RIGHT LOWER EXTREMITY (HCC): ICD-10-CM

## 2022-08-01 PROCEDURE — J9035Q0 INV BEVACIZUMAB 400MG/16ML 16 ML: Performed by: PHYSICIAN ASSISTANT

## 2022-08-01 PROCEDURE — J9999Q0 INV ATEZOLIZUMAB 1200MG/20ML 20 ML: Performed by: PHYSICIAN ASSISTANT

## 2022-08-01 PROCEDURE — 96417 CHEMO IV INFUS EACH ADDL SEQ: CPT

## 2022-08-01 PROCEDURE — 96413 CHEMO IV INFUSION 1 HR: CPT

## 2022-08-01 RX ORDER — ENOXAPARIN SODIUM 100 MG/ML
80 INJECTION SUBCUTANEOUS EVERY 24 HOURS
Qty: 72 ML | Refills: 1 | Status: SHIPPED | OUTPATIENT
Start: 2022-08-01 | End: 2023-01-28

## 2022-08-01 RX ADMIN — Medication 1200 MG: at 09:03

## 2022-08-01 RX ADMIN — Medication 858 MG: at 09:50

## 2022-08-01 RX ADMIN — SODIUM CHLORIDE 20 ML/HR: 0.9 INJECTION, SOLUTION INTRAVENOUS at 08:10

## 2022-08-01 NOTE — TELEPHONE ENCOUNTER
Patient states the pharmacy needs to be called  There is a problem with getting her lovenox, it is not active   Patient can be reached at [phone]

## 2022-08-01 NOTE — PROGRESS NOTES
Patient tolerated treatment without incident today  Port remains with brisk blood return, flushed easily without resistance  Deaccessed by DALILA RN, bandaid in place  Patient aware of next appt, declines AVS  Patient AAOx4 and ambulatory upon DC without gait disturbance noted

## 2022-08-01 NOTE — PROGRESS NOTES
Pt here for Atezolizumab and Bevacizumab infusion  Labs reviewed from  7/29/22 and with in parameters  Port accessed and blood return noted with NSS infusing @ [facility]  Pt resting comfortably and has no further questions or concerns at this time  Call bell with in reach

## 2022-08-01 NOTE — PATIENT INSTRUCTIONS
Nutrition Rx & Recommendations:  Diet: High Calorie, High Protein (for high calorie foods see pages 52-53, and for high protein foods see pages 49-51 in your Eating Hints book)  Small, frequent meals/snacks may be easier to tolerate than 3 large daily meals  Aim for 5-6 small meals per day (every 2-3 hours)  Include protein at all meals/snacks  Follow proper oral care; Try baking soda/salt water rinse recipe (mix 3/4 tsp salt + 1 tsp baking soda + 1 qt water; rinse with plain water after using) in Eating Hints book (pg 18)  Brush your teeth before/after meals & before bed  For sore mouth/throat: choose foods that are easy to chew/swallow; cook foods until they are soft/tender; moisten & soften foods (gravy/sauce/broth/yogurt); cut food into small pieces; drink with a straw (as tolerated); eat with a very small spoon; eat cold or room temperature food; suck on ice chips; Avoid citrus, spicy foods, tomatoes/ketchup, salty foods, raw vegetables, sharp/crunchy/hard foods & alcohol (see pages 23-28 in your Eating Hints book)  For dry mouth: sip water throughout the day; try very sweet (or tart, as tolerated) drinks; chew gum or suck on hard candy, popsicles, & ice chips; eat easy to swallow foods (such as pureed cooked foods or soups); moisten food with sauce/gravy/salad dressing; do not drink beer, wine, or any type of alcohol; keep lips moist with lip balm; avoid tobacco products & second-hand smoke; try Biotene as needed (see pages 17-18 in your Eating Hints book)  Follow proper oral care; Try baking soda/salt water rinse recipe (mix 3/4 tsp salt + 1 tsp baking soda + 1 qt water; rinse with pain water after using) in Eating Hints book (pg 18)  Brush your teeth before/after meals & before bed  Weigh yourself regularly  If you notice weight loss, make an effort to increase your daily food/calorie intake   If you continue to notice loss after these efforts, reach out to your dietitian to establish a plan to stabilize weight     Snack ideas: [place] yogurt with fruit, pudding, veggies with humus OR try greek yogurt based vegetable dip, peanut butter, grapes and cheese, fruit with a cheese stick , cottage cheese, soup, unjury, protein shakes/smoothies  Continue to keep a food journal as long as it is helpful    Patient choosen goals:  6 small, protein-containing meals  Try to cook a meal 1X/week to limit eating out; use simple recipes; try the [employer]Your[employer] com website for recipe suggestions   Nutrition Supplements:    Re-start Premier Protein Shake 1X/day in the morning    Follow Up Plan: 8/22 during infusion   Recommend Referral to Other Providers: none at this time

## 2022-08-01 NOTE — PROGRESS NOTES
Patient was seen in the infusion center for cycle 29  Patient was given Cycle 29 Rucaparib pills and drug diary  Patient did not have any pills or diaries to return as CYCLE 28 Rucaparib pills were held  Patient denied any changes to conmeds since office visit prior to infusion  Patient states she feels well overall   Patient was told to call with any questions or concerns  Patient tolerated treatment without incident

## 2022-08-02 ENCOUNTER — OFFICE VISIT (OUTPATIENT)
Dept: PALLIATIVE MEDICINE | Facility: CLINIC | Age: 67
End: 2022-08-02
Payer: COMMERCIAL

## 2022-08-02 VITALS
TEMPERATURE: 97.7 F | WEIGHT: 126.54 LBS | HEART RATE: 64 BPM | SYSTOLIC BLOOD PRESSURE: 120 MMHG | BODY MASS INDEX: 25.56 KG/M2 | DIASTOLIC BLOOD PRESSURE: 80 MMHG | OXYGEN SATURATION: 99 %

## 2022-08-02 DIAGNOSIS — G89.3 CANCER RELATED PAIN: ICD-10-CM

## 2022-08-02 DIAGNOSIS — K59.03 DRUG INDUCED CONSTIPATION: ICD-10-CM

## 2022-08-02 DIAGNOSIS — R11.0 CHEMOTHERAPY-INDUCED NAUSEA: ICD-10-CM

## 2022-08-02 DIAGNOSIS — R63.0 ANOREXIA: ICD-10-CM

## 2022-08-02 DIAGNOSIS — R64 CACHEXIA (HCC): ICD-10-CM

## 2022-08-02 DIAGNOSIS — T45.1X5A CHEMOTHERAPY-INDUCED NAUSEA: ICD-10-CM

## 2022-08-02 DIAGNOSIS — C54.1 ENDOMETRIAL CANCER (HCC): Primary | Chronic | ICD-10-CM

## 2022-08-02 PROCEDURE — 99214 OFFICE O/P EST MOD 30 MIN: CPT | Performed by: NURSE PRACTITIONER

## 2022-08-02 RX ORDER — DRONABINOL 2.5 MG/1
2.5 CAPSULE ORAL
Qty: 180 CAPSULE | Refills: 0 | Status: SHIPPED | OUTPATIENT
Start: 2022-08-02 | End: 2022-08-26 | Stop reason: SDUPTHER

## 2022-08-02 NOTE — PROGRESS NOTES
Outpatient Follow-Up - Palliative and Supportive Care   Ragini Melendez 61 y o  female [id number]    Assessment & Plan  Problem List Items Addressed This Visit        Digestive    Drug induced constipation       Genitourinary    Endometrial cancer ([facility] [address] ) - Primary (Chronic)       Other    Cancer related pain    Chemotherapy-induced nausea    Relevant Medications    dronabinol (MARINOL) 2 5 mg capsule    Cachexia (HCC)    Relevant Medications    dronabinol (MARINOL) 2 5 mg capsule      Other Visit Diagnoses     Anorexia        Relevant Medications    dronabinol (MARINOL) 2 5 mg capsule          Medications adjusted this encounter:  Requested Prescriptions     Signed Prescriptions Disp Refills    dronabinol (MARINOL) 2 5 mg capsule 180 capsule 0     Sig: Take 1 capsule (2 5 mg total) by mouth 2 (two) times a day before meals     No orders of the defined types were placed in this encounter  Medications Discontinued During This Encounter   Medication Reason    dronabinol (MARINOL) 2 5 mg capsule Reorder         Zach Avery was seen today for symptoms and planning cares related to above illnesses  I have reviewed the patient's controlled substance dispensing history in the Prescription Drug Monitoring Program in compliance with the [facility] regulations before prescribing any controlled substances  They are invited to continue to follow with us  If there are questions or concerns, please contact us through our clinic/answering service 24 hours a day, seven days a week  April D RAFITA Hassan  [facility] Palliative and Supportive Care        Visit Information    Accompanied By: sister,     Source of History: Self    History Limitations: None      History of Present Illness      Zach Avery is a 61 y o  female who presents in follow up of symptoms related to endometrial cancer    Pertinent issues include: symptom management, pain, neoplasm related, assessment of goals of care, support in serious illness  The patient restarted her Rucaparib yesterday  Treatment had been on hold in setting of anemia  She requests refill of Marinol she recognizes that her appetite typically lacks when she is on her medication  OVerall she states she generally feels well  She does not experiences pain  She wakes in the night to drain her nephrostomy tube but otherwise sleeps well  Discussed pursuing option in order to prevent her having to wake up to empty her bag and she states that she is fine with her normal routine and would not want to do anything new  She is looking forward to going on vacation with her family in September  Past medical, surgical, social, and family histories are reviewed and pertinent updates are made  Review of Systems   Constitutional: Positive for appetite change and fatigue  All other systems reviewed and are negative  Vital Signs    /80 (BP Location: Left arm, Cuff Size: Standard)   Pulse 64   Temp 97 7 °F (36 5 °C) (Temporal)   Wt 57 4 kg (126 lb 8 7 oz)   LMP  (LMP Unknown)   SpO2 99%   BMI 25 56 kg/m²     Physical Exam and Objective Data  Physical Exam  Vitals and nursing note reviewed  Constitutional:       General: She is not in acute distress  Appearance: She is underweight  She is ill-appearing  HENT:      Head: Normocephalic and atraumatic  Mouth/Throat:      Mouth: Mucous membranes are moist    Eyes:      Conjunctiva/sclera: Conjunctivae normal       Pupils: Pupils are equal, round, and reactive to light  Cardiovascular:      Rate and Rhythm: Normal rate and regular rhythm  Heart sounds: No murmur heard  Pulmonary:      Effort: Pulmonary effort is normal  No respiratory distress or retractions  Breath sounds: Normal breath sounds  Abdominal:      Palpations: Abdomen is soft  Tenderness: There is no abdominal tenderness  Musculoskeletal:      Cervical back: Neck supple  Skin:     General: Skin is warm and dry  Coloration: Skin is pale  Neurological:      General: No focal deficit present  Mental Status: She is alert and oriented to person, place, and time  GCS: GCS eye subscore is 4  GCS verbal subscore is 5  GCS motor subscore is 6  Psychiatric:         Attention and Perception: Attention normal          Mood and Affect: Affect is flat  Behavior: Behavior is cooperative  Cognition and Memory: Cognition and memory normal          25+ minutes was spent face to face with her sister with greater than 50% of the time spent in counseling or coordination of care including discussions of treatment instructions, follow up requirements and support in serious illness   All of the patient's or agent's questions were answered during this discussion

## 2022-08-03 ENCOUNTER — TELEPHONE (OUTPATIENT)
Dept: PALLIATIVE MEDICINE | Facility: CLINIC | Age: 67
End: 2022-08-03

## 2022-08-03 NOTE — TELEPHONE ENCOUNTER
Prior Authorization needed for Dronabinol 2 5 mg    [address]:    Anola Penny- [phone]  Fx# [phone]

## 2022-08-05 NOTE — TELEPHONE ENCOUNTER
KEY# [id number]       UNABLE TO COMPLETE VIA CMM  Need to call [employer] to verify name  and address

## 2022-08-09 ENCOUNTER — TELEPHONE (OUTPATIENT)
Dept: SURGICAL ONCOLOGY | Facility: CLINIC | Age: 67
End: 2022-08-09

## 2022-08-09 ENCOUNTER — PATIENT MESSAGE (OUTPATIENT)
Dept: UROLOGY | Facility: AMBULATORY SURGERY CENTER | Age: 67
End: 2022-08-09

## 2022-08-09 DIAGNOSIS — N95.2 VAGINAL ATROPHY: ICD-10-CM

## 2022-08-09 DIAGNOSIS — N39.0 RECURRENT UTI: Primary | ICD-10-CM

## 2022-08-09 RX ORDER — ESTRADIOL 0.1 MG/G
CREAM VAGINAL
Qty: 42.5 G | Refills: 1 | Status: SHIPPED | OUTPATIENT
Start: 2022-08-09 | End: 2023-03-22

## 2022-08-09 NOTE — TELEPHONE ENCOUNTER
Patient calling for [facility], [address]   She would like a refill of her Estrace, estradiol cream sent to [facility] on [address] in [place]   Patient can be reached at [phone]

## 2022-08-10 ENCOUNTER — APPOINTMENT (OUTPATIENT)
Dept: LAB | Facility: CLINIC | Age: 67
End: 2022-08-10
Payer: COMMERCIAL

## 2022-08-10 DIAGNOSIS — N39.0 RECURRENT UTI: ICD-10-CM

## 2022-08-10 PROCEDURE — 87086 URINE CULTURE/COLONY COUNT: CPT

## 2022-08-11 ENCOUNTER — APPOINTMENT (OUTPATIENT)
Dept: LAB | Facility: CLINIC | Age: 67
End: 2022-08-11
Payer: COMMERCIAL

## 2022-08-11 DIAGNOSIS — C54.1 ENDOMETRIAL CANCER (HCC): Primary | ICD-10-CM

## 2022-08-11 LAB
BACTERIA UR QL AUTO: ABNORMAL /HPF
BILIRUB UR QL STRIP: NEGATIVE
CLARITY UR: ABNORMAL
COLOR UR: YELLOW
GLUCOSE UR STRIP-MCNC: NEGATIVE MG/DL
HGB UR QL STRIP.AUTO: ABNORMAL
KETONES UR STRIP-MCNC: NEGATIVE MG/DL
LEUKOCYTE ESTERASE UR QL STRIP: ABNORMAL
NITRITE UR QL STRIP: NEGATIVE
NON-SQ EPI CELLS URNS QL MICRO: ABNORMAL /HPF
PH UR STRIP.AUTO: 6.5 [PH]
PROT UR STRIP-MCNC: ABNORMAL MG/DL
RBC #/AREA URNS AUTO: ABNORMAL /HPF
SP GR UR STRIP.AUTO: 1.01 (ref 1–1.03)
UROBILINOGEN UR STRIP-ACNC: <2 MG/DL
WBC #/AREA URNS AUTO: ABNORMAL /HPF
WBC CLUMPS # UR AUTO: PRESENT /UL

## 2022-08-11 PROCEDURE — 81001 URINALYSIS AUTO W/SCOPE: CPT | Performed by: NURSE PRACTITIONER

## 2022-08-11 NOTE — TELEPHONE ENCOUNTER
Prior authorization for pt's   Dronabinol 2 5 mg cap has been completed verbally with insurance rep Helder Rachel     Ref number   PA-A [id number]       Awaiting determination

## 2022-08-12 ENCOUNTER — TELEPHONE (OUTPATIENT)
Dept: GYNECOLOGIC ONCOLOGY | Facility: CLINIC | Age: 67
End: 2022-08-12

## 2022-08-12 LAB — BACTERIA UR CULT: NORMAL

## 2022-08-12 NOTE — TELEPHONE ENCOUNTER
Return call placed to patient  She notes hematuria, with blood in her nephrostomy bag   Encouraged her to hold lovenox until Monday, and contact IR to notify them of bleeding      ----- Message from Adin Monroy MA sent at 8/12/2022  9:59 AM EDT -----  Regarding: Lovenox Injx's  Pt would like to speak to you in ref to her Lovenox injx's

## 2022-08-12 NOTE — TELEPHONE ENCOUNTER
I retrieved a message on our machine that further clinical documentation will be needed for this medication and they will be faxing the forms shortly

## 2022-08-12 NOTE — PROGRESS NOTES
Outpatient Oncology Nutrition Consult  Type of Consult: Follow Up  Care Location: [facility]   Nutrition Assessment:  Oncology Diagnosis & Treatments:   Endometrial cancer  S/p surgery 12/19/17  Recurrence 7/8/19  S/p chemotherapy (carboplatin/taxol from 7/30/19-1/6/20)  S/p whole pelvis RT (2/4/20- 4/13/20)  Disease progression  ENDOBAR trial started 12/21/20    Oncology History   Endometrial cancer ([facility] [address] )   11/17/2017 Initial Diagnosis    Endometrial cancer ([facility] [address] )     11/17/2017 Biopsy    ENDOMETRIAL BIOPSY: WELL DIFFERENTIATED ENDOMETRIAL ADENOCARCINOMA (FIGO I) WITH FOCALMUCINOUS FEATURES  Part B: ENDOCERVICAL POLYP:BENIGN ENDOCERVICAL      12/19/2017 Surgery    Robotic assisted total laparoscopic hysterectomy with bilateral salpingo-oophorectomy and sentinel bilateral pelvic lymph node dissection  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 (4 4 x 3 2 cm tumor, 9 4/15 4mm invasion, NO LVSI, washings revealed atypical cellular changes)     12/19/2017 Genetic Testing    Morrison testing negative     6/28/2019 Biopsy    A  Breast, Right, US BX Right Breast 1000 4 cmfn:  - Benign breast tissue with focal histiocytic aggregate  See comment   - Negative for atypia and in-situ or invasive carcinoma  7/8/2019 Recurrence    Presented with right lower extremity DVT and CT demonstrating right pelvic sidewall mass with venous, ureteral and nerve compression causing significant neuropathic pain  Biopsy:  Lymph Node, Right pelvic lymph node x3:  - High-grade adenocarcinoma  7/30/2019 - 1/6/2020 Chemotherapy    Taxol 175 mg/m2 and carboplatin AUC 6 every 21 days  Dose was reduced to taxol 135 mg/m2 and carboplatin AUC 5  Completed 6 cycles  Treatment protracted due to multiple hospital admissions       2/24/2020 - 4/13/2020 Radiation    adjuvant external beam radiation therapy to the whole pelvis to 4500 cGy followed by boost to gross disease of an additional 2200 cGy     11/23/2020 Progression    New necrotic adenopathy in the retroperitoneum on CT      12/21/2020 -  Chemotherapy    Began chemotherapy with rucaparib, atezolizumab, and bevacizumab as per [facility] clinical trial        PMH: RNYGB (2001 at [address])    Past Medical History:   Diagnosis Date    Anemia     Chemotherapy follow-up examination     Depression     Endometrial cancer ([address] ) 12/2017    History of chemotherapy     started 12/2021endometrial cancer    Hyperglycemia     vx type 2 dm -- last assessed 4/1/14; resolved 11/7/17    Hyperlipidemia     Hypertension     Obesity     last assessed 10/14/17; resolved 11/7/17     Past Surgical History:   Procedure Laterality Date    ABDOMINAL SURGERY      GASTRIC BYPASS    BREAST BIOPSY Right 06/28/2019    core biopsy; benign    CHOLECYSTECTOMY      at the time of gastric bypass    COLONOSCOPY      CT NEEDLE BIOPSY LYMPH NODE  07/08/2019    FL GUIDED CENTRAL VENOUS ACCESS DEVICE INSERTION  11/12/2019    GASTRIC BYPASS      HYSTERECTOMY Bilateral 2017    total abdominal with salpingo-oophorectomy    IR NEPHROSTOMY TO NEPHROURETERAL STENT  06/09/2022    IR NEPHROSTOMY TUBE CHECK/CHANGE/REPOSITION/REINSERTION/UPSIZE  05/27/2022    IR NEPHROSTOMY TUBE PLACEMENT  07/26/2019    IR NEPHROURETERAL STENT CHECK/CHANGE/REPOSITION  04/06/2021    IR NEPHROURETERAL STENT CHECK/CHANGE/REPOSITION  06/04/2021    IR NEPHROURETERAL STENT CHECK/CHANGE/REPOSITION  09/03/2021    IR NEPHROURETERAL STENT CHECK/CHANGE/REPOSITION  12/07/2021    IR NEPHROURETERAL STENT CHECK/CHANGE/REPOSITION  03/08/2022    IR NEPHROURETERAL STENT CHECK/CHANGE/REPOSITION  05/10/2022    IR PICC LINE  09/27/2019    IR PORT PLACEMENT  07/26/2019    IR PORT REMOVAL  09/20/2019    OOPHORECTOMY Bilateral 2017    WI INSJ TUNNELED CTR VAD W/SUBQ PORT AGE 5 YR/> Left 11/12/2019    Procedure: INSERTION VENOUS PORT ( PORT-A-CATH) IR;  Surgeon: Silverio Escamilla DO;  [facility];  Service: Interventional Radiology    ID LAP, RADICAL HYST W/ TUBE&OV, NODE BX N/A 12/19/2017    Procedure: HYSTERECTOMY LAPAROSCOPIC TOTAL ([address]) W/ ROBOTICS; BILATERAL SALPINGOOPHERECTOMY; LYMPH NODE DISSECTION; lysis of adhesions;  Surgeon: Elier Perez MD;  Location: [facility];  Service: Gynecology Oncology    ID LAP,DIAGNOSTIC ABDOMEN N/A 12/19/2017    Procedure: LAPAROSCOPY DIAGNOSTIC;  Surgeon: Elier Perez MD;  [facility];  Service: Gynecology Oncology    TONSILLECTOMY      US GUIDED BREAST BIOPSY RIGHT COMPLETE Right 06/28/2019       Review of Medications:   Vitamins, Supplements and Herbals: yes: Hx RNYGB regimen: Vitamin D, Folic Acid, Align, calcium citrate TID, B12 1000 mcg QD, MVI BID (MVI does not have iron);  Iron 65 mg QD (2 hrs seperate from calcium)    Current Outpatient Medications:     acetaminophen-codeine (TYLENOL #3) 300-30 mg per tablet, Take 1 tablet by mouth every 6 (six) hours as needed for moderate pain (Patient not taking: No sig reported), Disp: 20 tablet, Rfl: 0    ARIPiprazole (ABILIFY) 20 MG tablet, Take 20 mg by mouth in the morning , Disp: , Rfl:     aspirin (ECOTRIN LOW STRENGTH) 81 mg EC tablet, Take 81 mg by mouth daily, Disp: , Rfl:     Calcium-Magnesium-Vitamin D (CALCIUM 500 PO), Take by mouth daily , Disp: , Rfl:     cholecalciferol (VITAMIN D3) 1,000 units tablet, Take 1,000 Units by mouth daily, Disp: , Rfl:     clotrimazole-betamethasone (LOTRISONE) 1-0 05 % cream, Apply topically 2 (two) times a day, Disp: 30 g, Rfl: 0    Cranberry-Vitamin C 250-60 MG CAPS, Take 1 tablet by mouth in the morning, Disp: 90 capsule, Rfl: 3    Cyanocobalamin (VITAMIN B12 PO), Take by mouth daily , Disp: , Rfl:     dronabinol (MARINOL) 2 5 mg capsule, Take 1 capsule (2 5 mg total) by mouth 2 (two) times a day before meals, Disp: 180 capsule, Rfl: 0    enoxaparin (LOVENOX) 80 mg/0 8 mL, Inject 0 8 mL (80 mg total) under the skin every 24 hours, Disp: 72 mL, Rfl: 1   estradiol (ESTRACE VAGINAL) 0 1 mg/g vaginal cream, Insert 1g nightly for 6 weeks, then 1-2x weekly  , Disp: 42 5 g, Rfl: 1    folic acid (FOLVITE) 1 mg tablet, Take 1 tablet (1 mg total) by mouth daily, Disp: , Rfl: 0    gemfibrozil (LOPID) 600 mg tablet, TAKE 1 TABLET BY MOUTH EVERY DAY, Disp: 90 tablet, Rfl: 0    lactulose (CHRONULAC) 10 g/15 mL solution, TAKE 45 ML (30 G TOTAL) BY MOUTH 2 (TWO) TIMES A DAY as needed, Disp: 300 mL, Rfl: 2    LORazepam (ATIVAN) 0 5 mg tablet, Take 1 tablet (0 5 mg total) by mouth every 6 (six) hours as needed for anxiety (or nausea) (Patient not taking: No sig reported), Disp: 36 tablet, Rfl: 1    Multiple Vitamin (MULTIVITAMIN) tablet, Take 1 tablet by mouth daily, Disp: , Rfl:     naloxone (NARCAN) 4 mg/0 1 mL nasal spray, Administer 1 spray into a nostril  If no response after 2-3 minutes, give another dose in the other nostril using a new spray , Disp: 1 each, Rfl: 0    nitrofurantoin (MACROBID) 100 mg capsule, Take 1 capsule (100 mg total) by mouth 2 (two) times a day, Disp: 10 capsule, Rfl: 0    omeprazole (PriLOSEC) 20 mg delayed release capsule, Take 20 mg by mouth daily, Disp: , Rfl:     ondansetron (ZOFRAN) 8 mg tablet, Take 1 tablet (8 mg total) by mouth every 8 (eight) hours as needed for nausea or vomiting, Disp: 30 tablet, Rfl: 1    oxybutynin (DITROPAN XL) 15 MG 24 hr tablet, TAKE 1 TABLET BY MOUTH DAILY AT BEDTIME, Disp: 90 tablet, Rfl: 3    oxyCODONE-acetaminophen (Percocet) 5-325 mg per tablet, Take 1 tablet by mouth every 4 (four) hours as needed for moderate pain Max Daily Amount: 6 tablets, Disp: 30 tablet, Rfl: 0    phenazopyridine (PYRIDIUM) 200 mg tablet, Take 1 tablet (200 mg total) by mouth daily as needed for bladder spasms, Disp: 10 tablet, Rfl: 0    rucaparib (RUBRACA) 300 mg tablet, Take 600 mg by mouth every 12 (twelve) hours Take with or without food  Do not repeat a vomited dose   (Patient not taking: No sig reported), Disp: , Rfl:    saccharomyces boulardii (FLORASTOR) 250 mg capsule, Take 1 capsule (250 mg total) by mouth 2 (two) times a day, Disp: , Rfl: 0    senna (SENOKOT) 8 6 MG tablet, Take 1 tablet (8 6 mg total) by mouth 2 (two) times a day as needed for constipation (Patient not taking: No sig reported), Disp: 60 tablet, Rfl: 0    venlafaxine (EFFEXOR) 75 mg tablet, Take 75 mg by mouth 3 (three) times a day  , Disp: , Rfl:   No current facility-administered medications for this visit      Facility-Administered Medications Ordered in Other Visits:     INV bevacizumab (INVESTIGATIONAL) 858 mg in sodium chloride 0 9 % 65 68 mL IVPB, 858 mg, Intravenous, Once, Vince Bateman MD    sodium chloride 0 9 % infusion, 20 mL/hr, Intravenous, Continuous, Vince Bateman MD, Last Rate: 20 mL/hr at 08/22/22 0840, 20 mL/hr at 08/22/22 0840    Most Recent Lab Results:  Lab Results   Component Value Date    WBC 6 65 08/19/2022    IRON 92 11/19/2021    TIBC 374 11/19/2021    FERRITIN 1,429 (H) 11/19/2021    CHOLESTEROL 155 08/19/2022    CHOL 183 10/27/2017    TRIG 87 04/15/2022    HDL 72 04/15/2022    LDLCALC 100 04/15/2022    ALT 13 08/19/2022    AST 15 08/19/2022    ALB 4 0 08/19/2022     10/27/2017     05/12/2017    SODIUM 138 08/19/2022    SODIUM 140 07/29/2022    K 3 8 08/19/2022    K 3 7 07/29/2022     08/19/2022    BUN 26 (H) 08/19/2022    BUN 21 07/29/2022    CREATININE 0 91 08/19/2022    CREATININE 0 92 07/29/2022    EGFR 66 08/19/2022    PHOS 4 2 (H) 08/19/2022    PHOS 4 1 07/29/2022    GLUCOSE 219 (H) 12/19/2017    POCGLU 97 08/18/2020    GLUF 89 08/19/2022    GLUF 100 (H) 07/08/2022    GLUC 89 08/19/2022    HGBA1C 4 8 07/07/2021    HGBA1C 5 1 02/03/2020    HGBA1C 6 2 08/28/2019    CALCIUM 9 2 08/19/2022    FOLATE 5 4 10/06/2019    MG 1 7 (L) 08/19/2022     Anthropometric Measurements:   Height: 59"  Ht Readings from Last 1 Encounters:   08/22/22 4' 11 02" (1 499 m)     Wt Readings from Last 20 Encounters:   08/22/22 57 8 kg (127 lb 8 oz)   08/19/22 57 6 kg (127 lb)   08/18/22 57 2 kg (126 lb)   08/15/22 57 2 kg (126 lb)   08/02/22 57 4 kg (126 lb 8 7 oz)   08/01/22 58 1 kg (128 lb)   07/29/22 57 2 kg (126 lb)   07/27/22 55 3 kg (122 lb)   07/21/22 55 3 kg (122 lb)   07/12/22 54 kg (119 lb)   07/11/22 54 kg (119 lb)   07/08/22 53 5 kg (118 lb)   06/20/22 51 7 kg (114 lb)   06/17/22 51 7 kg (114 lb)   06/09/22 53 5 kg (118 lb)   06/07/22 53 5 kg (118 lb)   05/31/22 53 9 kg (118 lb 13 3 oz)   05/27/22 53 5 kg (117 lb 15 1 oz)   05/27/22 53 5 kg (118 lb)   05/18/22 54 9 kg (121 lb)     Weight Hx:  Usual Weight: 270# before RNYGB in 2001; lowest post-op wt: 200#; wt typically fluctuates between 215-230# (prior to wt loss)  Varian: (2/25/20) 185 6#, (3/3/20) 188 6#, (3/10/20) 187 2#, (3/19/20) 186 2#, (3/24/20) 187#, (3/31/20) 185 4#, (4/10/20) 184 4#    Home Scale Wts: (2/19/20) 185#, (8/3/20) 194#    Oncology Nutrition-Anthropometrics    Flowsheet Row Nutrition from 8/22/2022 in [facility] Nutrition from 8/1/2022 in [facility] On[facility] Diet[facility]   Patient age (years): 61 years 61 years   Patient (female) height (in): 61 in 61 in   Current Weight to be used for anthropometric calculations (lbs) 127 5 lbs 128 lbs   Current Weight to be used for anthropometric calculations (kg) 58 kg 58 2 kg   BMI: 25 7 25 9   IBW female: 95 lbs 95 lbs   IBW (kg) female: 43 2 kg 43 2 kg   IBW % (female) 134 2 % 134 7 %   Adjusted BW (female): 103 1 lbs 103 3 lbs   Adjusted BW kg (female): 46 9 kg 47 kg   % weight change after 1 week: 1 2 % --   Weight change after 1 week (lbs) 1 5 lbs --   % weight change after 1 month: 4 5 % 8 5 %   Weight change after 1 month (lbs) 5 5 lbs 10 lbs   % weight change after 3 months: 5 4 % 5 3 %   Weight change after 3 months (lbs) 6 5 lbs 6 5 lbs   % weight change after 6 months: -- -5 6 %   Weight change after 6 months (lbs) -- -7 6 lbs   % weight change after 1 year: -- -20 5 %         Nutrition-Focused Physical Findings:  moderate muscle depletion ([place]) - hollowing/scooping/depression and moderate body fat depletion ([place]) - hollowing/depression/dark circles    Food/Nutrition-Related History & Client/Social History:    Current Nutrition Impact Symptoms:   [] Nausea  [] Reduced Appetite - r/t constipation per pt [] Acid Reflux   [] Vomiting  [x] Unintended Wt Loss - recent hx  -pt does not want to regain the wt that she lost [x] Malabsorption - S/p RNYGB in 2001   [] Diarrhea  [] Unintended Wt Gain  [] Dumping Syndrome   [x] Constipation - no BM x2 days, plans to take senokot later today  -On bowel protocol [] Thick Mucous/Secretions  [] Abdominal Pain    [] Dysgeusia (Altered Taste) [x] Xerostomia (Dry Mouth) - comes and goes  -uses bs/sw rinses and Biotene [] Gas    [] Dysosmia (Altered Smell)  [] Thrush  [] Difficulty Chewing    [x] Oral Mucositis (Sore Mouth) - mouth sores come/go  -using biotene products + bs/sw rinses [] Fatigue - much better since break from oral chemo [] Pain:   [] Odynophagia   [] Esophagitis  [] Other:    [] Dysphagia [] Early Satiety  [] No Problems Eating      Food Allergies & Intolerances: yes: had skin patch testing which showed allergy to strawberries, but says she is able to eat strawberries without any side effects    Current Diet: Small Frequent Meals  Current Nutrition Intake: Unchanged from last visit   Appetite: Good   Nutrition Route: PO   Oral Care: Biotene toothpaste, rinse and spray, bs/sw rinses  Activity level: Sedentary  Limited by pain and medical condition       24 Hr Diet Recall:   Breakfast: (9-10am) regular English muffin with butter and strawberry jelly (not very hungry in the morning)  Snack: strawberries or peaches  Lunch: visiting friends: 3 oz beef roast, 1/2 baked potato, 1/2 cup corn  Snack: ice cream cake  Dinner: 2 pc cheese pizza, fresh fruit salad  Snack: pc of chicken cutlet (has been having 2 smaller dinners)    Beverages: water (16 9 oz x2), sweetened iced tea (32 oz x1 + 24 oz x1)   -Does not drink coffee or alcohol   -Does not separate fluids from solids, does not have discomfort while eating and drinking at the same time  Supplements:   Premier Protein Shake (11 oz, 160 kcal, 30 g pro) - none lately   Unjury protein powder - occasionally adds to foods such as soup    Oncology Nutrition-Estimated Needs    Flowsheet Row Nutrition from 3/28/2022 in [place] [address] [facility] Nutrition from 10/11/2021 in [place] [address] On[facility] Services   Weight type used Actual weight Adjusted weight   Weight in kilograms (kg) used for estimated needs 54 1 kg 49 5 kg   Energy needs formula:  35-40 kcal/kg 35-40 kcal/kg   Energy needs based on 35 kcal/k kcal 1733 kcal   Energy needs based on 40 kcal/k kcal 1980 kcal   Protein needs formula: 1 5-2 g/kg 1 5-2 g/kg   Protein needs based on 1 5 g/kg 81 g 74 g   Protein needs based on 2 g/kg 108 g 99 g   Fluid needs formula: 30-35 mL/kg 30-35 mL/kg   Fluid needs based on 30 mL/kg 1623 mL 1485 mL   Fluid needs in ounces 55 oz 50 oz   Fluid needs based on 35 mL/kg 1894 mL 1733 mL   Fluid needs in ounces 64 oz 59 oz        Discussion & Intervention:    Ragini was evaluated today for an RD follow up regarding wt loss and pt request for dietitian involvement throughout tx  Ragini is currently undergoing tx for endometrial cancer  Ragini is doing well today  Her weight has been steady  She has a good appetite and has been eating well  She thinks that constipation affects her appetite the most and she has not had a BM for the past 2 days  She plans to take senokot today  She will be going on vacation to [place] [address] on 9/10 and is looking forward to her trip     Reviewed 24 hour recall, which revealed an adequate po intake, and discussed ways to increase kcal, protein, and fluid intakes and optimize nutrient intake  Also reviewed the importance of wt management throughout the tx process and the role of a high kcal/ high protein diet in managing wt and overall health  Based on today's assessment, discussion included: MNT for: hx wt loss, a high kcal/protein diet & food choices to include at all meals & snacks (Examples of high kcal foods: cheese, full-fat dairy products, nut butter, plant-based fats, coconut oil/milk, avocado, butter, cream soup, etc  Examples of high protein foods: eggs, chicken, fish, beans/legumes, nuts/nut butters, bone broth, etc ) , adequate hydration & fluid choices, utilizing oral nutrition supplements and recipe suggestions/resources, trying new recipes, & cooking at home more often  Moving forward, Ragini was encouraged to increase kcal, protein, and fluid intakes  She does best with self-directed goals and has chosen goals for herself moving forward; RD guidance provided during goal setting  Materials Provided: not applicable  All questions and concerns addressed during todays visit  Ragini has RD contact information  Nutrition Diagnosis:  Increased Nutrient Needs (kcal & pro) related to increased demand for nutrients and disease state as evidenced by cancer dx and pt undergoing tx for cancer  Monitoring & Evaluation:   Goals:  weight maintenance/stabilization  adequate nutrition impact symptom management  pt to meet >/=75% estimated nutrition needs daily     Progress Towards Goals: Progressing    Nutrition Rx & Recommendations:  Diet: High Calorie, High Protein (for high calorie foods see pages 52-53, and for high protein foods see pages 49-51 in your Eating Hints book)  Small, frequent meals/snacks may be easier to tolerate than 3 large daily meals  Aim for 5-6 small meals per day (every 2-3 hours)  Include protein at all meals/snacks    Follow proper oral care; Try baking soda/salt water rinse recipe (mix 3/4 tsp salt + 1 tsp baking soda + 1 qt water; rinse with plain water after using) in Eating Hints book (pg 18)  Brush your teeth before/after meals & before bed  For sore mouth/throat: choose foods that are easy to chew/swallow; cook foods until they are soft/tender; moisten & soften foods (gravy/sauce/broth/yogurt); cut food into small pieces; drink with a straw (as tolerated); eat with a very small spoon; eat cold or room temperature food; suck on ice chips; Avoid citrus, spicy foods, tomatoes/ketchup, salty foods, raw vegetables, sharp/crunchy/hard foods & alcohol (see pages 23-28 in your Eating Hints book)  For dry mouth: sip water throughout the day; try very sweet (or tart, as tolerated) drinks; chew gum or suck on hard candy, popsicles, & ice chips; eat easy to swallow foods (such as pureed cooked foods or soups); moisten food with sauce/gravy/salad dressing; do not drink beer, wine, or any type of alcohol; keep lips moist with lip balm; avoid tobacco products & second-hand smoke; try Biotene as needed (see pages 17-18 in your Eating Hints book)  Follow proper oral care; Try baking soda/salt water rinse recipe (mix 3/4 tsp salt + 1 tsp baking soda + 1 qt water; rinse with pain water after using) in Eating Hints book (pg 18)  Brush your teeth before/after meals & before bed  Weigh yourself regularly  If you notice weight loss, make an effort to increase your daily food/calorie intake  If you continue to notice loss after these efforts, reach out to your dietitian to establish a plan to stabilize weight  Snack ideas: [place] yogurt with fruit, pudding, veggies with humus OR try greek yogurt based vegetable dip, peanut butter, grapes and cheese, fruit with a cheese stick , cottage cheese, soup, unjury, protein shakes/smoothies  Continue to keep a food journal as long as it is helpful    Patient choosen goals:   Add protein to breakfast: peanut butter on English muffin, eggs, 1/2 Premier shake    Follow Up Plan: 10/3 during infusion   Recommend Referral to Other Providers: none at this time

## 2022-08-13 NOTE — SEDATION DOCUMENTATION
Pt instructed to flush PCNU 2x/day with 3-5cc NSS until urine is clear  Pt was instructed by Dr Corky Dunn  Pt understood instructions, this also was explained to pt's sister as well as demonstration on how to flush PCNU   Pt sent home with flushes, gauze, and extra drainage bag as well as AVS     Pt on PO amoxicillin, no ABX given for tube change 3

## 2022-08-15 ENCOUNTER — HOSPITAL ENCOUNTER (EMERGENCY)
Facility: HOSPITAL | Age: 67
Discharge: HOME | End: 2022-08-15
Attending: EMERGENCY MEDICINE
Payer: COMMERCIAL

## 2022-08-15 ENCOUNTER — APPOINTMENT (EMERGENCY)
Dept: RADIOLOGY | Facility: HOSPITAL | Age: 67
End: 2022-08-15
Payer: COMMERCIAL

## 2022-08-15 ENCOUNTER — TELEPHONE (OUTPATIENT)
Dept: OBGYN CLINIC | Facility: HOSPITAL | Age: 67
End: 2022-08-15

## 2022-08-15 VITALS
RESPIRATION RATE: 18 BRPM | HEART RATE: 68 BPM | HEIGHT: 59 IN | WEIGHT: 126 LBS | DIASTOLIC BLOOD PRESSURE: 80 MMHG | TEMPERATURE: 97.8 F | OXYGEN SATURATION: 98 % | SYSTOLIC BLOOD PRESSURE: 144 MMHG | BODY MASS INDEX: 25.4 KG/M2
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
## 2022-08-15 DIAGNOSIS — T14.8XXA FRACTURE: ICD-10-CM

## 2022-08-15 PROCEDURE — 73090 X-RAY EXAM OF FOREARM: CPT

## 2022-08-15 PROCEDURE — 73080 X-RAY EXAM OF ELBOW: CPT

## 2022-08-15 PROCEDURE — 73060 X-RAY EXAM OF HUMERUS: CPT

## 2022-08-15 PROCEDURE — 73030 X-RAY EXAM OF SHOULDER: CPT

## 2022-08-15 PROCEDURE — 73130 X-RAY EXAM OF HAND: CPT

## 2022-08-15 PROCEDURE — 99283 EMERGENCY DEPT VISIT LOW MDM: CPT

## 2022-08-15 PROCEDURE — 99282 EMERGENCY DEPT VISIT SF MDM: CPT | Performed by: EMERGENCY MEDICINE

## 2022-08-15 PROCEDURE — 73110 X-RAY EXAM OF WRIST: CPT

## 2022-08-15 NOTE — TELEPHONE ENCOUNTER
Spoke to Victorino at [employer] she stated the Dronabinol was approved from 8/12/2022-2/11/2023   She will be Faxing approval to the office

## 2022-08-15 NOTE — ED PROVIDER NOTES
History  Chief Complaint   Patient presents with    Fall     Pt states flip flop got caught in door pt fell onto L arm this past Sat denies hitting head, pt is on Lovenox      60yo F presenting s/p fall  2d ago, while Pt was entering her home from the outside, the flip flops she was wearing got caught on the door  She lost her balance and fell on her left shoulder/arm  Denies LOC, confusion, HA, visual changes, headstrike  She is on Lovenox d/t DVT hx  She has tried tylenol and lidocaine patch, which only helped the lower arm pain, however, the shoulder and upper arm is still in significant pain causing weakness and limited ROM d/t pain  History provided by:  Patient      Prior to Admission Medications   Prescriptions Last Dose Informant Patient Reported? Taking? ARIPiprazole (ABILIFY) 20 MG tablet  Self Yes No   Sig: Take 20 mg by mouth in the morning     Calcium-Magnesium-Vitamin D (CALCIUM 500 PO)  Self Yes No   Sig: Take by mouth daily    Cranberry-Vitamin C 250-60 MG CAPS   No No   Sig: Take 1 tablet by mouth in the morning   Cyanocobalamin (VITAMIN B12 PO)  Self Yes No   Sig: Take by mouth daily    LORazepam (ATIVAN) 0 5 mg tablet   No No   Sig: Take 1 tablet (0 5 mg total) by mouth every 6 (six) hours as needed for anxiety (or nausea)   Patient not taking: No sig reported   Multiple Vitamin (MULTIVITAMIN) tablet  Self Yes No   Sig: Take 1 tablet by mouth daily   acetaminophen-codeine (TYLENOL #3) 300-30 mg per tablet   No No   Sig: Take 1 tablet by mouth every 6 (six) hours as needed for moderate pain   Patient not taking: No sig reported   aspirin (ECOTRIN LOW STRENGTH) 81 mg EC tablet  Self Yes No   Sig: Take 81 mg by mouth daily   cholecalciferol (VITAMIN D3) 1,000 units tablet  Self Yes No   Sig: Take 1,000 Units by mouth daily   clotrimazole-betamethasone (LOTRISONE) 1-0 05 % cream   No No   Sig: Apply topically 2 (two) times a day   dronabinol (MARINOL) 2 5 mg capsule   No No   Sig: Take 1 capsule (2 5 mg total) by mouth 2 (two) times a day before meals   enoxaparin (LOVENOX) 80 mg/0 8 mL   No No   Sig: Inject 0 8 mL (80 mg total) under the skin every 24 hours   estradiol (ESTRACE VAGINAL) 0 1 mg/g vaginal cream   No No   Sig: Insert 1g nightly for 6 weeks, then 1-2x weekly  folic acid (FOLVITE) 1 mg tablet  Self No No   Sig: Take 1 tablet (1 mg total) by mouth daily   gemfibrozil (LOPID) 600 mg tablet  Self No No   Sig: TAKE 1 TABLET BY MOUTH EVERY DAY   lactulose (CHRONULAC) 10 g/15 mL solution  Self No No   Sig: TAKE 45 ML (30 G TOTAL) BY MOUTH 2 (TWO) TIMES A DAY as needed   naloxone (NARCAN) 4 mg/0 1 mL nasal spray   No No   Sig: Administer 1 spray into a nostril  If no response after 2-3 minutes, give another dose in the other nostril using a new spray  nitrofurantoin (MACROBID) 100 mg capsule   No No   Sig: Take 1 capsule (100 mg total) by mouth 2 (two) times a day   omeprazole (PriLOSEC) 20 mg delayed release capsule  Self Yes No   Sig: Take 20 mg by mouth daily   ondansetron (ZOFRAN) 8 mg tablet   No No   Sig: Take 1 tablet (8 mg total) by mouth every 8 (eight) hours as needed for nausea or vomiting   oxyCODONE-acetaminophen (Percocet) 5-325 mg per tablet  Self No No   Sig: Take 1 tablet by mouth every 4 (four) hours as needed for moderate pain Max Daily Amount: 6 tablets   oxybutynin (DITROPAN XL) 15 MG 24 hr tablet  Self No No   Sig: TAKE 1 TABLET BY MOUTH DAILY AT BEDTIME   phenazopyridine (PYRIDIUM) 200 mg tablet   No No   Sig: Take 1 tablet (200 mg total) by mouth daily as needed for bladder spasms   rucaparib (RUBRACA) 300 mg tablet   Yes No   Sig: Take 600 mg by mouth every 12 (twelve) hours Take with or without food  Do not repeat a vomited dose     Patient not taking: No sig reported   saccharomyces boulardii (FLORASTOR) 250 mg capsule  Self No No   Sig: Take 1 capsule (250 mg total) by mouth 2 (two) times a day   senna (SENOKOT) 8 6 MG tablet   No No   Sig: Take 1 tablet (8 6 mg total) by mouth 2 (two) times a day as needed for constipation   Patient not taking: No sig reported   venlafaxine (EFFEXOR) 75 mg tablet  Self Yes No   Sig: Take 75 mg by mouth 3 (three) times a day        Facility-Administered Medications: None       Past Medical History:   Diagnosis Date    Anemia     Chemotherapy follow-up examination     Depression     Endometrial cancer ([facility] [address] ) 12/2017    History of chemotherapy     started 12/2021endometrial cancer    Hyperglycemia     vx type 2 dm -- last assessed 4/1/14; resolved 11/7/17    Hyperlipidemia     Hypertension     Obesity     last assessed 10/14/17; resolved 11/7/17       Past Surgical History:   Procedure Laterality Date    ABDOMINAL SURGERY      GASTRIC BYPASS    BREAST BIOPSY Right 06/28/2019    core biopsy; benign    CHOLECYSTECTOMY      at the time of gastric bypass    COLONOSCOPY      CT NEEDLE BIOPSY LYMPH NODE  07/08/2019    FL GUIDED CENTRAL VENOUS ACCESS DEVICE INSERTION  11/12/2019    GASTRIC BYPASS      HYSTERECTOMY Bilateral 2017    total abdominal with salpingo-oophorectomy    IR NEPHROSTOMY TO NEPHROURETERAL STENT  06/09/2022    IR NEPHROSTOMY TUBE CHECK/CHANGE/REPOSITION/REINSERTION/UPSIZE  05/27/2022    IR NEPHROSTOMY TUBE PLACEMENT  07/26/2019    IR NEPHROURETERAL STENT CHECK/CHANGE/REPOSITION  04/06/2021    IR NEPHROURETERAL STENT CHECK/CHANGE/REPOSITION  06/04/2021    IR NEPHROURETERAL STENT CHECK/CHANGE/REPOSITION  09/03/2021    IR NEPHROURETERAL STENT CHECK/CHANGE/REPOSITION  12/07/2021    IR NEPHROURETERAL STENT CHECK/CHANGE/REPOSITION  03/08/2022    IR NEPHROURETERAL STENT CHECK/CHANGE/REPOSITION  05/10/2022    IR PICC LINE  09/27/2019    IR PORT PLACEMENT  07/26/2019    IR PORT REMOVAL  09/20/2019    OOPHORECTOMY Bilateral 2017    OR INSJ TUNNELED CTR VAD W/SUBQ PORT AGE 5 YR/> Left 11/12/2019    Procedure: INSERTION VENOUS PORT ( PORT-A-CATH) IR;  Surgeon: Zay Blake DO;  [facility]; Service: Interventional Radiology    [place] LAP, RADICAL HYST W/ TUBE&OV, NODE BX N/A 12/19/2017    Procedure: HYSTERECTOMY LAPAROSCOPIC TOTAL ([address]) W/ ROBOTICS; BILATERAL SALPINGOOPHERECTOMY; LYMPH NODE DISSECTION; lysis of adhesions;  Surgeon: Ish Beck MD;  [facility];  Service: Gynecology Oncology    [place] LAP,DIAGNOSTIC ABDOMEN N/A 12/19/2017    Procedure: LAPAROSCOPY DIAGNOSTIC;  Surgeon: Ish Beck MD;  [facility];  Service: Gynecology Oncology    TONSILLECTOMY      US GUIDED BREAST BIOPSY RIGHT COMPLETE Right 06/28/2019       Family History   Problem Relation Age of Onset    Other Mother         dyslipidemia    Ovarian cancer Mother 48    Lymphoma Father     Breast cancer Sister 61    No Known Problems Maternal Grandmother     Bone cancer Maternal Grandfather     Uterine cancer Paternal Grandmother     No Known Problems Paternal Grandfather     No Known Problems Brother     No Known Problems Brother     No Known Problems Maternal Aunt     No Known Problems Maternal Aunt     No Known Problems Maternal Aunt     No Known Problems Maternal Aunt     No Known Problems Paternal Aunt     No Known Problems Paternal Aunt     No Known Problems Paternal Aunt     No Known Problems Paternal Aunt      I have reviewed and agree with the history as documented  E-Cigarette/Vaping    E-Cigarette Use Never User      E-Cigarette/Vaping Substances    Nicotine No     THC No     CBD No     Flavoring No     Other No     Unknown No      Social History     Tobacco Use    Smoking status: Never Smoker    Smokeless tobacco: Never Used   Vaping Use    Vaping Use: Never used   Substance Use Topics    Alcohol use: Not Currently    Drug use: No        Review of Systems   Constitutional: Negative  HENT: Negative  Respiratory: Negative  Cardiovascular: Negative  Gastrointestinal: Negative  Genitourinary: Negative      Musculoskeletal: Positive for arthralgias and myalgias  Skin: Negative  Neurological: Negative  Psychiatric/Behavioral: Negative  Physical Exam  ED Triage Vitals [08/15/22 0852]   Temperature Pulse Respirations Blood Pressure SpO2   97 8 °F (36 6 °C) 68 18 144/80 98 %      Temp Source Heart Rate Source Patient Position - Orthostatic VS BP Location FiO2 (%)   Oral Monitor -- Right arm --      Pain Score       --             Orthostatic Vital Signs  Vitals:    08/15/22 0852   BP: 144/80   Pulse: 68       Physical Exam  Constitutional:       General: She is not in acute distress  Appearance: Normal appearance  HENT:      Head: Normocephalic and atraumatic  Right Ear: External ear normal       Left Ear: External ear normal       Nose: Nose normal  No rhinorrhea  Eyes:      General: No scleral icterus  Conjunctiva/sclera: Conjunctivae normal    Cardiovascular:      Rate and Rhythm: Normal rate and regular rhythm  Pulses: Normal pulses  Heart sounds: Normal heart sounds  Pulmonary:      Effort: Pulmonary effort is normal  No respiratory distress  Breath sounds: Normal breath sounds  Abdominal:      General: Abdomen is flat  Bowel sounds are normal       Palpations: Abdomen is soft  Tenderness: There is no abdominal tenderness  Musculoskeletal:         General: Tenderness (left humeral head and shaft) present  No swelling or deformity  Comments: ROM limited on the left d/t pain   Skin:     General: Skin is warm and dry  Capillary Refill: Capillary refill takes less than 2 seconds  Neurological:      General: No focal deficit present  Mental Status: She is alert and oriented to person, place, and time  Sensory: No sensory deficit     Psychiatric:         Mood and Affect: Mood normal          Behavior: Behavior normal          ED Medications  Medications - No data to display    Diagnostic Studies  Results Reviewed     None                 XR elbow 3+ views LEFT   Final Result by Randy Muniz Annmarie Lynne MD (08/15 1037)         1  Minimally displaced oblique fracture of the surgical neck left humerus  2   No other acute fracture or dislocation appreciated in the left upper extremity  The study was marked in [facility] for immediate notification  Workstation performed: [id number]         XR humerus LEFT   Final Result by Isis Gonzales MD (08/15 1037)         1  Minimally displaced oblique fracture of the surgical neck left humerus  2   No other acute fracture or dislocation appreciated in the left upper extremity  The study was marked in [facility] for immediate notification  Workstation performed: [id number]         XR shoulder 2+ views LEFT   Final Result by Isis Gonzales MD (08/15 1037)         1  Minimally displaced oblique fracture of the surgical neck left humerus  2   No other acute fracture or dislocation appreciated in the left upper extremity  The study was marked in [facility] for immediate notification  Workstation performed: [id number]         XR forearm 2 views LEFT   Final Result by Isis Gonzales MD (08/15 1037)         1  Minimally displaced oblique fracture of the surgical neck left humerus  2   No other acute fracture or dislocation appreciated in the left upper extremity  The study was marked in [facility] for immediate notification  Workstation performed: [id number]         XR wrist 3+ views LEFT   Final Result by Isis Gonzales MD (08/15 1037)         1  Minimally displaced oblique fracture of the surgical neck left humerus  2   No other acute fracture or dislocation appreciated in the left upper extremity  The study was marked in [facility] for immediate notification  Workstation performed: [id number]         XR hand 3+ views LEFT   Final Result by Isis Gonzales MD (08/15 1037)         1  Minimally displaced oblique fracture of the surgical neck left humerus     2   No other acute fracture or dislocation appreciated in the left upper extremity  The study was marked in [place] [facility] for immediate notification  Workstation performed: [id number]               Procedures  Procedures      ED Course                                       MDM  Number of Diagnoses or Management Options  Fall, initial encounter  Fracture  Diagnosis management comments: Pt w/ humeral head Fx  Pt stable for d/c with sling, ortho f/u, and return precautions  Disposition  Final diagnoses:   Fall, initial encounter   Fracture     Time reflects when diagnosis was documented in both MDM as applicable and the Disposition within this note     Time User Action Codes Description Comment    8/15/2022 11:01 AM Janna Dannyon Add [W19  IVNH] Fall, initial encounter     8/15/2022 11:01 AM Janna Croon Add Mary Maximus  8XXA] Fracture       ED Disposition     ED Disposition   Discharge    Condition   Stable    Date/Time   Mon Aug 15, 2022 11:01 AM    Comment   Ragini Melendez discharge to home/self care                 Follow-up Information     Follow up With Specialties Details Why Contact Info Additional [address] [facility] [place] [facility] Schedule an appointment as soon as possible for a visit  As needed [address] [phone]  [address] [facility] [place], [address], [address] [place], [place], [facility]          Discharge Medication List as of 8/15/2022 11:05 AM      CONTINUE these medications which have NOT CHANGED    Details   acetaminophen-codeine (TYLENOL #3) 300-30 mg per tablet Take 1 tablet by mouth every 6 (six) hours as needed for moderate pain, Starting Tue 11/9/2021, Normal      ARIPiprazole (ABILIFY) 20 MG tablet Take 20 mg by mouth in the morning , Starting Thu 2/10/2022, Historical Med      aspirin (ECOTRIN LOW STRENGTH) 81 mg EC tablet Take 81 mg by mouth daily, Historical Med Calcium-Magnesium-Vitamin D (CALCIUM 500 PO) Take by mouth daily , Historical Med      cholecalciferol (VITAMIN D3) 1,000 units tablet Take 1,000 Units by mouth daily, Historical Med      clotrimazole-betamethasone (LOTRISONE) 1-0 05 % cream Apply topically 2 (two) times a day, Starting Thu 4/14/2022, Normal      Cranberry-Vitamin C 250-60 MG CAPS Take 1 tablet by mouth in the morning, Starting Tue 7/12/2022, Normal      Cyanocobalamin (VITAMIN B12 PO) Take by mouth daily , Historical Med      dronabinol (MARINOL) 2 5 mg capsule Take 1 capsule (2 5 mg total) by mouth 2 (two) times a day before meals, Starting Tue 8/2/2022, Normal      enoxaparin (LOVENOX) 80 mg/0 8 mL Inject 0 8 mL (80 mg total) under the skin every 24 hours, Starting Mon 8/1/2022, Until Sat 1/28/2023, Normal      estradiol (ESTRACE VAGINAL) 0 1 mg/g vaginal cream Insert 1g nightly for 6 weeks, then 1-2x weekly  , Normal      folic acid (FOLVITE) 1 mg tablet Take 1 tablet (1 mg total) by mouth daily, Starting Tue 10/8/2019, Print      gemfibrozil (LOPID) 600 mg tablet TAKE 1 TABLET BY MOUTH EVERY DAY, Normal      lactulose (CHRONULAC) 10 g/15 mL solution TAKE 45 ML (30 G TOTAL) BY MOUTH 2 (TWO) TIMES A DAY as needed, Normal      LORazepam (ATIVAN) 0 5 mg tablet Take 1 tablet (0 5 mg total) by mouth every 6 (six) hours as needed for anxiety (or nausea), Starting Fri 8/6/2021, Normal      Multiple Vitamin (MULTIVITAMIN) tablet Take 1 tablet by mouth daily, Historical Med      naloxone (NARCAN) 4 mg/0 1 mL nasal spray Administer 1 spray into a nostril   If no response after 2-3 minutes, give another dose in the other nostril using a new spray , Normal      nitrofurantoin (MACROBID) 100 mg capsule Take 1 capsule (100 mg total) by mouth 2 (two) times a day, Starting Tue 7/12/2022, Normal      omeprazole (PriLOSEC) 20 mg delayed release capsule Take 20 mg by mouth daily, Historical Med      ondansetron (ZOFRAN) 8 mg tablet Take 1 tablet (8 mg total) by mouth every 8 (eight) hours as needed for nausea or vomiting, Starting Fri 12/10/2021, Normal      oxybutynin (DITROPAN XL) 15 MG 24 hr tablet TAKE 1 TABLET BY MOUTH DAILY AT BEDTIME, Normal      oxyCODONE-acetaminophen (Percocet) 5-325 mg per tablet Take 1 tablet by mouth every 4 (four) hours as needed for moderate pain Max Daily Amount: 6 tablets, Starting Thu 4/14/2022, Normal      phenazopyridine (PYRIDIUM) 200 mg tablet Take 1 tablet (200 mg total) by mouth daily as needed for bladder spasms, Starting Fri 7/8/2022, Normal      rucaparib (RUBRACA) 300 mg tablet Take 600 mg by mouth every 12 (twelve) hours Take with or without food  Do not repeat a vomited dose , Historical Med      saccharomyces boulardii (FLORASTOR) 250 mg capsule Take 1 capsule (250 mg total) by mouth 2 (two) times a day, Starting Tue 10/8/2019, Print      senna (SENOKOT) 8 6 MG tablet Take 1 tablet (8 6 mg total) by mouth 2 (two) times a day as needed for constipation, Starting Tue 5/31/2022, Normal      venlafaxine (EFFEXOR) 75 mg tablet Take 75 mg by mouth 3 (three) times a day  , Historical Med           No discharge procedures on file  PDMP Review       Value Time User    PDMP Reviewed  Yes 8/2/2022 11:24 AM Harsh Bower           ED Provider  Attending physically available and evaluated Ragini Melendez I managed the patient along with the ED Attending      Electronically Signed by         Milly Jasso MD  08/15/22 1765

## 2022-08-15 NOTE — ED ATTENDING ATTESTATION
8/15/2022  Genia Muniz DO, saw and evaluated the patient  I have discussed the patient with the resident/non-physician practitioner and agree with the resident's/non-physician practitioner's findings, Plan of Care, and MDM as documented in the resident's/non-physician practitioner's note, except where noted  All available labs and Radiology studies were reviewed  I was present for key portions of any procedure(s) performed by the resident/non-physician practitioner and I was immediately available to provide assistance  At this point I agree with the current assessment done in the Emergency Department  I have conducted an independent evaluation of this patient a history and physical is as follows:    ED Course     Patient seen and examined  Patient with oblique femoral neck fracture  No signs of head injury  Fall happened on Saturday  Patient denies any headache, nausea or vomiting  No chest pain shortness of breath  Patient placed in a sling or and orthopedics follow-up provided  Patient discharged in stable condition  Return precautions provided      Critical Care Time  Procedures

## 2022-08-15 NOTE — TELEPHONE ENCOUNTER
Hello,  Please advise if the following patient can be forced onto the schedule:    Radha Mohr    : [id number]    MRN: [id number]    Call back #: [phone]    Insurance: [employer]    Reason for appointment: Displaced fracture humerus    Requested doctor/location:  [facility]/J[facility] Shelby      Thank you

## 2022-08-16 ENCOUNTER — TELEPHONE (OUTPATIENT)
Dept: UROLOGY | Facility: AMBULATORY SURGERY CENTER | Age: 67
End: 2022-08-16

## 2022-08-16 ENCOUNTER — PATIENT MESSAGE (OUTPATIENT)
Dept: UROLOGY | Facility: AMBULATORY SURGERY CENTER | Age: 67
End: 2022-08-16

## 2022-08-16 ENCOUNTER — HOSPITAL ENCOUNTER (OUTPATIENT)
Dept: RADIOLOGY | Facility: HOSPITAL | Age: 67
Discharge: HOME | End: 2022-08-16
Attending: OBSTETRICS & GYNECOLOGY
Payer: COMMERCIAL

## 2022-08-16 DIAGNOSIS — C54.1 ENDOMETRIAL CANCER (HCC): Primary | ICD-10-CM

## 2022-08-16 DIAGNOSIS — C54.1 ENDOMETRIAL CANCER (HCC): ICD-10-CM

## 2022-08-16 PROCEDURE — G1004 CDSM NDSC: HCPCS

## 2022-08-16 PROCEDURE — 71260 CT THORAX DX C+: CPT

## 2022-08-16 PROCEDURE — 74177 CT ABD & PELVIS W/CONTRAST: CPT

## 2022-08-16 RX ORDER — IODIXANOL 320 MG/ML
74 INJECTION, SOLUTION INTRAVASCULAR
Status: COMPLETED | OUTPATIENT
Start: 2022-08-16 | End: 2022-08-16

## 2022-08-16 RX ADMIN — IODIXANOL 74 ML: 320 INJECTION, SOLUTION INTRAVASCULAR at 16:41

## 2022-08-16 NOTE — TELEPHONE ENCOUNTER
Regarding: UTI  ----- Message from Mary Steven, RN sent at 8/16/2022  2:06 PM EDT -----       ----- Message from Miguel Rivas to Petra Mcgrath  sent at 8/16/2022  2:05 PM -----   Are the results of my urine culture in?       ----- Message -----       From:Beth Alva RN       Sent:8/9/2022 10:47 AM EDT         Miachel Dose    Subject:UTI    I placed orders for urine testing for you  You can go to any [facility] lab to have them done or if you use an outside lab let us know and we can get the scripts to you  Make sure you increase your water intake and we will call once we get the results in about 2-3 days  Once you give your sample you may try OTC AZO to help with the burning and bladder discomfort  Feel better soon!      ----- Message -----       From:Ragini Melendez       Sent:8/9/2022  9:13 AM EDT         To:Avery B Romayne Baston    Subject:UTI    I have been trying to call the office but there is over an hour wait each time I called so I decided to try a message  I believe I have a UTI - I have burning when I urinate, the frequency of urination has increased and Emily noticed blood in the urine in my nephrostomy tube  I assume I will need some urine lab tests so if you could enter them in the computer and let me know, I will have them done ASAP  Thanks!

## 2022-08-18 ENCOUNTER — OFFICE VISIT (OUTPATIENT)
Dept: OBGYN CLINIC | Facility: CLINIC | Age: 67
End: 2022-08-18
Payer: COMMERCIAL

## 2022-08-18 VITALS
DIASTOLIC BLOOD PRESSURE: 80 MMHG | OXYGEN SATURATION: 99 % | SYSTOLIC BLOOD PRESSURE: 122 MMHG | WEIGHT: 126 LBS | HEIGHT: 59 IN | HEART RATE: 57 BPM | BODY MASS INDEX: 25.4 KG/M2

## 2022-08-18 DIAGNOSIS — S42.295A OTHER CLOSED NONDISPLACED FRACTURE OF PROXIMAL END OF LEFT HUMERUS, INITIAL ENCOUNTER: Primary | ICD-10-CM

## 2022-08-18 PROCEDURE — 99203 OFFICE O/P NEW LOW 30 MIN: CPT | Performed by: ORTHOPAEDIC SURGERY

## 2022-08-18 PROCEDURE — 23600 CLTX PROX HUMRL FX W/O MNPJ: CPT | Performed by: ORTHOPAEDIC SURGERY

## 2022-08-18 NOTE — ASSESSMENT & PLAN NOTE
72-year-old with recurrent stage IB endometrial cancer who is receiving treatment per the [facility] trial presents for pre cycle 30 visit  Repeat imaging reviewed with stable small lymph nodes  She also has alise-renal collection which is most likely secondary to prior PCN replacement  Will continue to monitor       CBC, CMP, mag reviewed and all hematologic parameters support continued treatment

## 2022-08-18 NOTE — PROGRESS NOTES
Assessment/Plan:    Problem List Items Addressed This Visit        Genitourinary    Endometrial cancer ([facility] [address] ) - Primary (Chronic)     44-year-old with recurrent stage IB endometrial cancer who is receiving treatment per the [facility] trial presents for pre cycle 30 visit  Repeat imaging reviewed with stable small lymph nodes  She also has alise-renal collection which is most likely secondary to prior PCN replacement  Will continue to monitor  CBC, CMP, mag reviewed and all hematologic parameters support continued treatment           Obstruction of right ureter     Patient with failed PCN capping and replacement of PC and you  I discussed with patient that there is a known obstruction of the lower right ureter which is most likely secondary to scarring or previous treated tumor  Send a message to urology and IR regarding possible internalization of the stent as patient is severely affected by the PCN bag and strongly desires removal if possible  Other    Drug-induced anemia     Status post rucaparib break with resolution of her significant anemia  Hemoglobin is now in the 10s  Continued her baseline  We will continue to monitor                 CHIEF COMPLAINT: pre cycle 30      Problem:  Cancer Staging  Endometrial cancer [facility])  Staging form: Corpus Uteri - Carcinoma, AJCC 7th Edition  - Clinical stage from 12/19/2017: FIGO Stage IB (T1b, N0, M0) - Signed by Selina Aj MD on 2/12/2018        Previous therapy:  Oncology History   Endometrial cancer ([facility] [address] )   11/17/2017 Initial Diagnosis    Endometrial cancer ([facility] [address] )     11/17/2017 Biopsy    ENDOMETRIAL BIOPSY: WELL DIFFERENTIATED ENDOMETRIAL ADENOCARCINOMA (FIGO I) WITH FOCALMUCINOUS FEATURES  Part B: ENDOCERVICAL POLYP:BENIGN ENDOCERVICAL      12/19/2017 Surgery    Robotic assisted total laparoscopic hysterectomy with bilateral salpingo-oophorectomy and sentinel bilateral pelvic lymph node dissection   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 (4 4 x 3 2 cm tumor, 9 4/15 4mm invasion, NO LVSI, washings revealed atypical cellular changes)     12/19/2017 Genetic Testing    Morrison testing negative     6/28/2019 Biopsy    A  Breast, Right, US BX Right Breast 1000 4 cmfn:  - Benign breast tissue with focal histiocytic aggregate  See comment   - Negative for atypia and in-situ or invasive carcinoma  7/8/2019 Recurrence    Presented with right lower extremity DVT and CT demonstrating right pelvic sidewall mass with venous, ureteral and nerve compression causing significant neuropathic pain  Biopsy:  Lymph Node, Right pelvic lymph node x3:  - High-grade adenocarcinoma  7/30/2019 - 1/6/2020 Chemotherapy    Taxol 175 mg/m2 and carboplatin AUC 6 every 21 days  Dose was reduced to taxol 135 mg/m2 and carboplatin AUC 5  Completed 6 cycles  Treatment protracted due to multiple hospital admissions  2/24/2020 - 4/13/2020 Radiation    adjuvant external beam radiation therapy to the whole pelvis to 4500 cGy followed by boost to gross disease of an additional 2200 cGy     11/23/2020 Progression    New necrotic adenopathy in the retroperitoneum on CT      12/21/2020 -  Chemotherapy    Began chemotherapy with rucaparib, atezolizumab, and bevacizumab as per [facility] clinical trial            Patient ID: Stevie Mohr is a 61 y o  female  51-year-old with recurrent stage IB endometrial cancer who is receiving treatment per the [facility] trial presents for pre cycle 30 visit  Patient had a recent fall with emergency room visit revealing a left shoulder fracture  She has follow-up with ortho scheduled  She reports that this was a mechanical fall  She has had intermittent blood it is dark in her PCN bag as well as occasional blood in her urine  She denies any new onset flank pain  Denies any pelvic pain or discomfort  She is ongoing intermittent fatigue that is her baseline  She denies any residual constipation          The following portions of the patient's history were reviewed and updated as appropriate: allergies, current medications, past family history, past medical history, past social history, past surgical history and problem list     Review of Systems   Constitutional: Positive for fatigue  Negative for appetite change, chills and fever  Respiratory: Negative for chest tightness and shortness of breath  Gastrointestinal: Negative for abdominal distention, abdominal pain, constipation, diarrhea and nausea  Genitourinary: Positive for flank pain and hematuria  Negative for difficulty urinating, frequency, urgency, vaginal bleeding, vaginal discharge and vaginal pain  Musculoskeletal: Positive for arthralgias  Negative for back pain, joint swelling and myalgias  Left should pain   Skin: Negative for rash  Neurological: Negative for dizziness, light-headedness, numbness and headaches  Current Outpatient Medications   Medication Sig Dispense Refill    acetaminophen-codeine (TYLENOL #3) 300-30 mg per tablet Take 1 tablet by mouth every 6 (six) hours as needed for moderate pain (Patient not taking: No sig reported) 20 tablet 0    ARIPiprazole (ABILIFY) 20 MG tablet Take 20 mg by mouth in the morning        aspirin (ECOTRIN LOW STRENGTH) 81 mg EC tablet Take 81 mg by mouth daily      Calcium-Magnesium-Vitamin D (CALCIUM 500 PO) Take by mouth daily       cholecalciferol (VITAMIN D3) 1,000 units tablet Take 1,000 Units by mouth daily      clotrimazole-betamethasone (LOTRISONE) 1-0 05 % cream Apply topically 2 (two) times a day 30 g 0    Cranberry-Vitamin C 250-60 MG CAPS Take 1 tablet by mouth in the morning 90 capsule 3    Cyanocobalamin (VITAMIN B12 PO) Take by mouth daily       dronabinol (MARINOL) 2 5 mg capsule Take 1 capsule (2 5 mg total) by mouth 2 (two) times a day before meals 180 capsule 0    enoxaparin (LOVENOX) 80 mg/0 8 mL Inject 0 8 mL (80 mg total) under the skin every 24 hours 72 mL 1  estradiol (ESTRACE VAGINAL) 0 1 mg/g vaginal cream Insert 1g nightly for 6 weeks, then 1-2x weekly  81 4 g 1    folic acid (FOLVITE) 1 mg tablet Take 1 tablet (1 mg total) by mouth daily  0    gemfibrozil (LOPID) 600 mg tablet TAKE 1 TABLET BY MOUTH EVERY DAY 90 tablet 0    lactulose (CHRONULAC) 10 g/15 mL solution TAKE 45 ML (30 G TOTAL) BY MOUTH 2 (TWO) TIMES A DAY as needed 300 mL 2    LORazepam (ATIVAN) 0 5 mg tablet Take 1 tablet (0 5 mg total) by mouth every 6 (six) hours as needed for anxiety (or nausea) (Patient not taking: No sig reported) 36 tablet 1    Multiple Vitamin (MULTIVITAMIN) tablet Take 1 tablet by mouth daily      naloxone (NARCAN) 4 mg/0 1 mL nasal spray Administer 1 spray into a nostril  If no response after 2-3 minutes, give another dose in the other nostril using a new spray  1 each 0    nitrofurantoin (MACROBID) 100 mg capsule Take 1 capsule (100 mg total) by mouth 2 (two) times a day 10 capsule 0    omeprazole (PriLOSEC) 20 mg delayed release capsule Take 20 mg by mouth daily      ondansetron (ZOFRAN) 8 mg tablet Take 1 tablet (8 mg total) by mouth every 8 (eight) hours as needed for nausea or vomiting 30 tablet 1    oxybutynin (DITROPAN XL) 15 MG 24 hr tablet TAKE 1 TABLET BY MOUTH DAILY AT BEDTIME 90 tablet 3    oxyCODONE-acetaminophen (Percocet) 5-325 mg per tablet Take 1 tablet by mouth every 4 (four) hours as needed for moderate pain Max Daily Amount: 6 tablets 30 tablet 0    phenazopyridine (PYRIDIUM) 200 mg tablet Take 1 tablet (200 mg total) by mouth daily as needed for bladder spasms 10 tablet 0    rucaparib (RUBRACA) 300 mg tablet Take 600 mg by mouth every 12 (twelve) hours Take with or without food  Do not repeat a vomited dose   (Patient not taking: No sig reported)      saccharomyces boulardii (FLORASTOR) 250 mg capsule Take 1 capsule (250 mg total) by mouth 2 (two) times a day  0    senna (SENOKOT) 8 6 MG tablet Take 1 tablet (8 6 mg total) by mouth 2 (two) times a day as needed for constipation (Patient not taking: No sig reported) 60 tablet 0    venlafaxine (EFFEXOR) 75 mg tablet Take 75 mg by mouth 3 (three) times a day         No current facility-administered medications for this visit  Objective:    not currently breastfeeding  There is no height or weight on file to calculate BMI  There is no height or weight on file to calculate BSA  Physical Exam  HENT:      Head: Normocephalic and atraumatic  Nose: Nose normal    Cardiovascular:      Rate and Rhythm: Normal rate and regular rhythm  Pulmonary:      Effort: Pulmonary effort is normal    Abdominal:      General: There is no distension  Palpations: Abdomen is soft  There is no mass  Genitourinary:     Comments: defer  Musculoskeletal:         General: No swelling  Normal range of motion  Cervical back: Normal range of motion  Comments: Left should/arm in brace/sling   Skin:     General: Skin is warm and dry  Neurological:      General: No focal deficit present  Mental Status: She is alert     Psychiatric:         Mood and Affect: Mood normal            Lab Results   Component Value Date     10/27/2017    K 3 7 07/29/2022     (H) 07/29/2022    CO2 23 07/29/2022    BUN 21 07/29/2022    CREATININE 0 92 07/29/2022    GLUCOSE 219 (H) 12/19/2017    GLUF 100 (H) 07/08/2022    CALCIUM 8 8 07/29/2022    CORRECTEDCA 9 4 07/29/2022    AST 11 07/29/2022    ALT 13 07/29/2022    ALKPHOS 106 07/29/2022    PROT 6 9 10/27/2017    BILITOT 0 3 10/27/2017    EGFR 66 07/29/2022     Lab Results   Component Value Date    WBC 5 64 07/29/2022    HGB 9 7 (L) 07/29/2022    HCT 31 7 (L) 07/29/2022     (H) 07/29/2022     07/29/2022     Lab Results   Component Value Date    NEUTROABS 4 26 07/29/2022        Trend:  No results found for:

## 2022-08-18 NOTE — PATIENT INSTRUCTIONS
Recommend taking the following supplements: Vitamin D 4000 units per day and Calcium 1200 mg per day  This will help with bone healing

## 2022-08-18 NOTE — PROGRESS NOTES
[address] [facility]  [place] [address]  [phone]       Vikram Escoto  [id number]  1955    ORTHOPAEDIC SURGERY OUTPATIENT NOTE  8/18/2022      HISTORY:  61 y o  female  presents with left shoulder pain since age 17  She tripped and fell landing on her left arm  She is seen in the emergency room with x-rays and found to have a proximal humerus fracture  She has been in a sling  She denies numbness or tingling in the hand  She does have history of cancer is on Lovenox       Past Medical History:   Diagnosis Date    Anemia     Chemotherapy follow-up examination     Depression     Endometrial cancer ([facility] [address] ) 12/2017    History of chemotherapy     started 12/2021endometrial cancer    Hyperglycemia     vx type 2 dm -- last assessed 4/1/14; resolved 11/7/17    Hyperlipidemia     Hypertension     Obesity     last assessed 10/14/17; resolved 11/7/17       Past Surgical History:   Procedure Laterality Date    ABDOMINAL SURGERY      GASTRIC BYPASS    BREAST BIOPSY Right 06/28/2019    core biopsy; benign    CHOLECYSTECTOMY      at the time of gastric bypass    COLONOSCOPY      CT NEEDLE BIOPSY LYMPH NODE  07/08/2019    FL GUIDED CENTRAL VENOUS ACCESS DEVICE INSERTION  11/12/2019    GASTRIC BYPASS      HYSTERECTOMY Bilateral 2017    total abdominal with salpingo-oophorectomy    IR NEPHROSTOMY TO NEPHROURETERAL STENT  06/09/2022    IR NEPHROSTOMY TUBE CHECK/CHANGE/REPOSITION/REINSERTION/UPSIZE  05/27/2022    IR NEPHROSTOMY TUBE PLACEMENT  07/26/2019    IR NEPHROURETERAL STENT CHECK/CHANGE/REPOSITION  04/06/2021    IR NEPHROURETERAL STENT CHECK/CHANGE/REPOSITION  06/04/2021    IR NEPHROURETERAL STENT CHECK/CHANGE/REPOSITION  09/03/2021    IR NEPHROURETERAL STENT CHECK/CHANGE/REPOSITION  12/07/2021    IR NEPHROURETERAL STENT CHECK/CHANGE/REPOSITION  03/08/2022    IR NEPHROURETERAL STENT CHECK/CHANGE/REPOSITION 05/10/2022    IR PICC LINE  09/27/2019    IR PORT PLACEMENT  07/26/2019    IR PORT REMOVAL  09/20/2019    OOPHORECTOMY Bilateral 2017    OR INSJ TUNNELED CTR VAD W/SUBQ PORT AGE 5 YR/> Left 11/12/2019    Procedure: INSERTION VENOUS PORT ( PORT-A-CATH) IR;  Surgeon: Cali Antonio DO;  [facility];  Service: Interventional Radiology    OR LAP, RADICAL HYST W/ TUBE&OV, NODE BX N/A 12/19/2017    Procedure: HYSTERECTOMY LAPAROSCOPIC TOTAL ([address]) W/ ROBOTICS; BILATERAL SALPINGOOPHERECTOMY; LYMPH NODE DISSECTION; lysis of adhesions;  Surgeon: Yakelin Franks MD;  Location: [facility];  Service: Gynecology Oncology    OR LAP,DIAGNOSTIC ABDOMEN N/A 12/19/2017    Procedure: LAPAROSCOPY DIAGNOSTIC;  Surgeon: Yakelin Franks MD;  [facility];  Service: Gynecology Oncology    TONSILLECTOMY      US GUIDED BREAST BIOPSY RIGHT COMPLETE Right 06/28/2019       Social History     Socioeconomic History    Marital status: Single     Spouse name: Not on file    Number of children: Not on file    Years of education: Not on file    Highest education level: Not on file   Occupational History    Not on file   Tobacco Use    Smoking status: Never Smoker    Smokeless tobacco: Never Used   Vaping Use    Vaping Use: Never used   Substance and Sexual Activity    Alcohol use: Not Currently    Drug use: No    Sexual activity: Not Currently   Other Topics Concern    Not on file   Social History Narrative    Not on file     Social Determinants of Health     Financial Resource Strain: Not on file   Food Insecurity: Not on file   Transportation Needs: Not on file   Physical Activity: Not on file   Stress: Not on file   Social Connections: Not on file   Intimate Partner Violence: Not on file   Housing Stability: Not on file       Family History   Problem Relation Age of Onset    Other Mother         dyslipidemia    Ovarian cancer Mother 48    Lymphoma Father     Breast cancer Sister 61    No Known Problems Maternal Grandmother     Bone cancer Maternal Grandfather     Uterine cancer Paternal Grandmother     No Known Problems Paternal Grandfather     No Known Problems Brother     No Known Problems Brother     No Known Problems Maternal Aunt     No Known Problems Maternal Aunt     No Known Problems Maternal Aunt     No Known Problems Maternal Aunt     No Known Problems Paternal Aunt     No Known Problems Paternal Aunt     No Known Problems Paternal Aunt     No Known Problems Paternal Aunt         Patient's Medications   New Prescriptions    No medications on file   Previous Medications    ACETAMINOPHEN-CODEINE (TYLENOL #3) 300-30 MG PER TABLET    Take 1 tablet by mouth every 6 (six) hours as needed for moderate pain    ARIPIPRAZOLE (ABILIFY) 20 MG TABLET    Take 20 mg by mouth in the morning  ASPIRIN (ECOTRIN LOW STRENGTH) 81 MG EC TABLET    Take 81 mg by mouth daily    CALCIUM-MAGNESIUM-VITAMIN D (CALCIUM 500 PO)    Take by mouth daily     CHOLECALCIFEROL (VITAMIN D3) 1,000 UNITS TABLET    Take 1,000 Units by mouth daily    CLOTRIMAZOLE-BETAMETHASONE (LOTRISONE) 1-0 05 % CREAM    Apply topically 2 (two) times a day    CRANBERRY-VITAMIN C 250-60 MG CAPS    Take 1 tablet by mouth in the morning    CYANOCOBALAMIN (VITAMIN B12 PO)    Take by mouth daily     DRONABINOL (MARINOL) 2 5 MG CAPSULE    Take 1 capsule (2 5 mg total) by mouth 2 (two) times a day before meals    ENOXAPARIN (LOVENOX) 80 MG/0 8 ML    Inject 0 8 mL (80 mg total) under the skin every 24 hours    ESTRADIOL (ESTRACE VAGINAL) 0 1 MG/G VAGINAL CREAM    Insert 1g nightly for 6 weeks, then 1-2x weekly      FOLIC ACID (FOLVITE) 1 MG TABLET    Take 1 tablet (1 mg total) by mouth daily    GEMFIBROZIL (LOPID) 600 MG TABLET    TAKE 1 TABLET BY MOUTH EVERY DAY    LACTULOSE (CHRONULAC) 10 G/15 ML SOLUTION    TAKE 45 ML (30 G TOTAL) BY MOUTH 2 (TWO) TIMES A DAY as needed    LORAZEPAM (ATIVAN) 0 5 MG TABLET    Take 1 tablet (0 5 mg total) by mouth every 6 (six) hours as needed for anxiety (or nausea)    MULTIPLE VITAMIN (MULTIVITAMIN) TABLET    Take 1 tablet by mouth daily    NALOXONE (NARCAN) 4 MG/0 1 ML NASAL SPRAY    Administer 1 spray into a nostril  If no response after 2-3 minutes, give another dose in the other nostril using a new spray  NITROFURANTOIN (MACROBID) 100 MG CAPSULE    Take 1 capsule (100 mg total) by mouth 2 (two) times a day    OMEPRAZOLE (PRILOSEC) 20 MG DELAYED RELEASE CAPSULE    Take 20 mg by mouth daily    ONDANSETRON (ZOFRAN) 8 MG TABLET    Take 1 tablet (8 mg total) by mouth every 8 (eight) hours as needed for nausea or vomiting    OXYBUTYNIN (DITROPAN XL) 15 MG 24 HR TABLET    TAKE 1 TABLET BY MOUTH DAILY AT BEDTIME    OXYCODONE-ACETAMINOPHEN (PERCOCET) 5-325 MG PER TABLET    Take 1 tablet by mouth every 4 (four) hours as needed for moderate pain Max Daily Amount: 6 tablets    PHENAZOPYRIDINE (PYRIDIUM) 200 MG TABLET    Take 1 tablet (200 mg total) by mouth daily as needed for bladder spasms    RUCAPARIB (RUBRACA) 300 MG TABLET    Take 600 mg by mouth every 12 (twelve) hours Take with or without food  Do not repeat a vomited dose  SACCHAROMYCES BOULARDII (FLORASTOR) 250 MG CAPSULE    Take 1 capsule (250 mg total) by mouth 2 (two) times a day    SENNA (SENOKOT) 8 6 MG TABLET    Take 1 tablet (8 6 mg total) by mouth 2 (two) times a day as needed for constipation    VENLAFAXINE (EFFEXOR) 75 MG TABLET    Take 75 mg by mouth 3 (three) times a day     Modified Medications    No medications on file   Discontinued Medications    No medications on file       Allergies   Allergen Reactions    Cephalosporins Rash     Which Cephalosporin reaction was to not specified; however, has tolerated Amoxicillin, Cefazolin, and Cefepime        /80   Pulse 57   Ht 4' 11" (1 499 m)   Wt 57 2 kg (126 lb)   LMP  (LMP Unknown)   SpO2 99%   BMI 25 45 kg/m²      REVIEW OF SYSTEMS:  Constitutional: Negative  HEENT: Negative  Respiratory: Negative  Skin: Negative  Neurological: Negative  Psychiatric/Behavioral: Negative  Musculoskeletal: Negative except for that mentioned in the HPI  /80   Pulse 57   Ht 4' 11" (1 499 m)   Wt 57 2 kg (126 lb)   LMP  (LMP Unknown)   SpO2 99%   BMI 25 45 kg/m²   Gen: No acute distress, resting comfortably in bed  HEENT: Eyes clear, moist mucus membranes, hearing intact  Respiratory: No audible wheezing or stridor  Cardiovascular: Well Perfused peripherally, 2+ distal pulse  Abdomen: nondistended, no peritoneal signs     PHYSICAL EXAM:    Left shoulder  No ROM due to fracture  Mild bruising upper arm  Elbow NT/ no pain with ROM  SILT m    IMAGING:  XR left shoulder reviewed demonstrates nondisplaced left surgical neck proximal humerus fracture    ASSESSMENT AND PLAN:  61 y o  female  With left proximal humerus fracture  This can be treated non operatively was the  She will start vitamin D and calcium supplementation  She will use a sling and come out of the sling a few times a day to range the elbow  Nonweightbearing left upper extremity  We will see her back in 2 weeks for repeat x-ray and starting of pendulum exercises  She did request a script for some home health so I put an order for her      Fracture / Dislocation Treatment    Date/Time: 8/18/2022 1:21 PM  Performed by: Rodrigo Jordan  Authorized by: Rodrigo Jordan     Patient Location:  [facility]  [place] Protocol:  Consent given by: patient      Injury location:  Shoulder  Location details:  Left shoulder  Injury type:  Fracture  Fracture type: surgical neck    Neurovascular status: Neurovascularly intact    Immobilization:  Sling  Neurovascular status: Neurovascularly intact

## 2022-08-19 ENCOUNTER — HOSPITAL ENCOUNTER (OUTPATIENT)
Dept: INFUSION CENTER | Facility: CLINIC | Age: 67
Discharge: HOME | End: 2022-08-19
Payer: COMMERCIAL

## 2022-08-19 ENCOUNTER — OFFICE VISIT (OUTPATIENT)
Dept: GYNECOLOGIC ONCOLOGY | Facility: CLINIC | Age: 67
End: 2022-08-19
Payer: COMMERCIAL

## 2022-08-19 ENCOUNTER — APPOINTMENT (OUTPATIENT)
Dept: LAB | Facility: CLINIC | Age: 67
End: 2022-08-19

## 2022-08-19 ENCOUNTER — DOCUMENTATION (OUTPATIENT)
Dept: OTHER | Facility: HOSPITAL | Age: 67
End: 2022-08-19

## 2022-08-19 VITALS
BODY MASS INDEX: 25.6 KG/M2 | SYSTOLIC BLOOD PRESSURE: 128 MMHG | WEIGHT: 127 LBS | DIASTOLIC BLOOD PRESSURE: 80 MMHG | HEART RATE: 76 BPM | RESPIRATION RATE: 16 BRPM | HEIGHT: 59 IN | OXYGEN SATURATION: 97 %

## 2022-08-19 DIAGNOSIS — D64.89 DRUG-INDUCED ANEMIA: ICD-10-CM

## 2022-08-19 DIAGNOSIS — C54.1 ENDOMETRIAL CANCER (HCC): Primary | Chronic | ICD-10-CM

## 2022-08-19 DIAGNOSIS — C54.1 ENDOMETRIAL CANCER (HCC): Primary | ICD-10-CM

## 2022-08-19 DIAGNOSIS — N13.5 OBSTRUCTION OF RIGHT URETER: ICD-10-CM

## 2022-08-19 PROBLEM — R82.90 ABNORMAL URINE FINDINGS: Status: RESOLVED | Noted: 2021-06-25 | Resolved: 2022-08-19

## 2022-08-19 PROBLEM — D62 ACUTE BLOOD LOSS ANEMIA: Status: RESOLVED | Noted: 2022-06-17 | Resolved: 2022-08-19

## 2022-08-19 PROBLEM — M89.9 PUBIC BONE PAIN: Status: RESOLVED | Noted: 2021-05-14 | Resolved: 2022-08-19

## 2022-08-19 LAB
ALBUMIN SERPL BCP-MCNC: 4 G/DL (ref 3.5–5)
ALP SERPL-CCNC: 134 U/L (ref 34–104)
ALT SERPL W P-5'-P-CCNC: 13 U/L (ref 7–52)
AMYLASE SERPL-CCNC: 23 IU/L (ref 29–103)
ANION GAP SERPL CALCULATED.3IONS-SCNC: 9 MMOL/L (ref 4–13)
APTT PPP: 43 SECONDS (ref 23–37)
AST SERPL W P-5'-P-CCNC: 15 U/L (ref 13–39)
BACTERIA UR QL AUTO: ABNORMAL /HPF
BASOPHILS # BLD AUTO: 0.03 THOUSANDS/ΜL (ref 0–0.1)
BASOPHILS NFR BLD AUTO: 1 % (ref 0–1)
BILIRUB SERPL-MCNC: 0.3 MG/DL (ref 0.2–1)
BILIRUB UR QL STRIP: NEGATIVE
BUN SERPL-MCNC: 26 MG/DL (ref 5–25)
CALCIUM SERPL-MCNC: 9.2 MG/DL (ref 8.4–10.2)
CHLORIDE SERPL-SCNC: 106 MMOL/L (ref 96–108)
CHOLEST SERPL-MCNC: 155 MG/DL
CLARITY UR: ABNORMAL
CO2 SERPL-SCNC: 23 MMOL/L (ref 21–32)
COLOR UR: COLORLESS
CREAT SERPL-MCNC: 0.91 MG/DL (ref 0.6–1.3)
CREAT UR-MCNC: <13 MG/DL
EOSINOPHIL # BLD AUTO: 0.08 THOUSAND/ΜL (ref 0–0.61)
EOSINOPHIL NFR BLD AUTO: 1 % (ref 0–6)
ERYTHROCYTE [DISTWIDTH] IN BLOOD BY AUTOMATED COUNT: 12.7 % (ref 11.6–15.1)
GFR SERPL CREATININE-BSD FRML MDRD: 66 ML/MIN/1.73SQ M
GGT SERPL-CCNC: 9 U/L (ref 5–85)
GLUCOSE P FAST SERPL-MCNC: 89 MG/DL (ref 65–99)
GLUCOSE SERPL-MCNC: 89 MG/DL (ref 65–140)
GLUCOSE UR STRIP-MCNC: NEGATIVE MG/DL
HCT VFR BLD AUTO: 33.8 % (ref 34.8–46.1)
HGB BLD-MCNC: 10.5 G/DL (ref 11.5–15.4)
HGB UR QL STRIP.AUTO: ABNORMAL
IMM GRANULOCYTES # BLD AUTO: 0.07 THOUSAND/UL (ref 0–0.2)
IMM GRANULOCYTES NFR BLD AUTO: 1 % (ref 0–2)
INR PPP: 1.01 (ref 0.84–1.19)
KETONES UR STRIP-MCNC: NEGATIVE MG/DL
LEUKOCYTE ESTERASE UR QL STRIP: ABNORMAL
LIPASE SERPL-CCNC: 44 U/L (ref 11–82)
LYMPHOCYTES # BLD AUTO: 0.62 THOUSANDS/ΜL (ref 0.6–4.47)
LYMPHOCYTES NFR BLD AUTO: 9 % (ref 14–44)
MAGNESIUM SERPL-MCNC: 1.7 MG/DL (ref 1.9–2.7)
MCH RBC QN AUTO: 31.1 PG (ref 26.8–34.3)
MCHC RBC AUTO-ENTMCNC: 31.1 G/DL (ref 31.4–37.4)
MCV RBC AUTO: 100 FL (ref 82–98)
MONOCYTES # BLD AUTO: 0.68 THOUSAND/ΜL (ref 0.17–1.22)
MONOCYTES NFR BLD AUTO: 10 % (ref 4–12)
NEUTROPHILS # BLD AUTO: 5.17 THOUSANDS/ΜL (ref 1.85–7.62)
NEUTS SEG NFR BLD AUTO: 78 % (ref 43–75)
NITRITE UR QL STRIP: NEGATIVE
NON-SQ EPI CELLS URNS QL MICRO: ABNORMAL /HPF
NRBC BLD AUTO-RTO: 0 /100 WBCS
PH UR STRIP.AUTO: 6 [PH]
PHOSPHATE SERPL-MCNC: 4.2 MG/DL (ref 2.3–4.1)
PLATELET # BLD AUTO: 258 THOUSANDS/UL (ref 149–390)
PMV BLD AUTO: 10.8 FL (ref 8.9–12.7)
POTASSIUM SERPL-SCNC: 3.8 MMOL/L (ref 3.5–5.3)
PROT SERPL-MCNC: 7.4 G/DL (ref 6.4–8.4)
PROT UR STRIP-MCNC: ABNORMAL MG/DL
PROT UR-MCNC: 58 MG/DL
PROT/CREAT UR: >4.46 MG/G{CREAT} (ref 0–0.1)
PROTHROMBIN TIME: 13.5 SECONDS (ref 11.6–14.5)
RBC # BLD AUTO: 3.38 MILLION/UL (ref 3.81–5.12)
RBC #/AREA URNS AUTO: ABNORMAL /HPF
SODIUM SERPL-SCNC: 138 MMOL/L (ref 135–147)
SP GR UR STRIP.AUTO: 1.01 (ref 1–1.03)
T3 SERPL-MCNC: 1 NG/ML (ref 0.6–1.8)
T4 FREE SERPL-MCNC: 1.03 NG/DL (ref 0.76–1.46)
TSH SERPL DL<=0.05 MIU/L-ACNC: 3.07 UIU/ML (ref 0.45–4.5)
UROBILINOGEN UR STRIP-ACNC: <2 MG/DL
WBC # BLD AUTO: 6.65 THOUSAND/UL (ref 4.31–10.16)
WBC #/AREA URNS AUTO: ABNORMAL /HPF
WBC CLUMPS # UR AUTO: PRESENT /UL

## 2022-08-19 PROCEDURE — 84439 ASSAY OF FREE THYROXINE: CPT

## 2022-08-19 PROCEDURE — 84156 ASSAY OF PROTEIN URINE: CPT

## 2022-08-19 PROCEDURE — 85730 THROMBOPLASTIN TIME PARTIAL: CPT

## 2022-08-19 PROCEDURE — 83690 ASSAY OF LIPASE: CPT

## 2022-08-19 PROCEDURE — 84100 ASSAY OF PHOSPHORUS: CPT

## 2022-08-19 PROCEDURE — 82465 ASSAY BLD/SERUM CHOLESTEROL: CPT

## 2022-08-19 PROCEDURE — 84443 ASSAY THYROID STIM HORMONE: CPT

## 2022-08-19 PROCEDURE — 99215 OFFICE O/P EST HI 40 MIN: CPT | Performed by: OBSTETRICS & GYNECOLOGY

## 2022-08-19 PROCEDURE — 85025 COMPLETE CBC W/AUTO DIFF WBC: CPT

## 2022-08-19 PROCEDURE — 83735 ASSAY OF MAGNESIUM: CPT

## 2022-08-19 PROCEDURE — 85610 PROTHROMBIN TIME: CPT

## 2022-08-19 PROCEDURE — 81001 URINALYSIS AUTO W/SCOPE: CPT

## 2022-08-19 PROCEDURE — 82977 ASSAY OF GGT: CPT

## 2022-08-19 PROCEDURE — 82570 ASSAY OF URINE CREATININE: CPT

## 2022-08-19 PROCEDURE — 84480 ASSAY TRIIODOTHYRONINE (T3): CPT

## 2022-08-19 PROCEDURE — 82150 ASSAY OF AMYLASE: CPT

## 2022-08-19 PROCEDURE — 80053 COMPREHEN METABOLIC PANEL: CPT

## 2022-08-19 RX ORDER — SODIUM CHLORIDE 9 MG/ML
20 INJECTION, SOLUTION INTRAVENOUS CONTINUOUS
Status: DISCONTINUED | OUTPATIENT
Start: 2022-08-22 | End: 2022-08-25 | Stop reason: HOSPADM

## 2022-08-19 NOTE — PROGRESS NOTES
Ragini Melendez (1955) is patient#[id number] on the [facility] clinical trial  Pt was seen for pre-treatment office visit and physical exam withDr Felicity Orellana on 19-Aug-2022  AEs and ConMeds were reviewed with pt  She is eating well and gaining weight  Mouth sores are resolved at this time   Patient did have a recent ER visit , patient stated that she was wearing flip flops and the caught on her door entrance way and she fell  She had her arm xrayed in the ER and was found to have fractured her humorous  She is experiencing mild fatigue, and her Hgb has increased to 10 5  overall she states that she is feeling well  Patients schedule was reviewed   Pt will call with any questions or concerns

## 2022-08-19 NOTE — PROGRESS NOTES
Patient here for lab work  Port flushed, blood return noted, labs drawn per order  Patient declined AVS  Next appointment reviewed with patient

## 2022-08-19 NOTE — ASSESSMENT & PLAN NOTE
Patient with failed PCN capping and replacement of PC and you  I discussed with patient that there is a known obstruction of the lower right ureter which is most likely secondary to scarring or previous treated tumor  Send a message to urology and IR regarding possible internalization of the stent as patient is severely affected by the PCN bag and strongly desires removal if possible

## 2022-08-19 NOTE — PROGRESS NOTES
Labs were reviewed and signed by Dr Diogo Au   Per protocol, pt is okay to proceed with treatment for cycle 30 day 1 scheduled 22-Aug-2022

## 2022-08-19 NOTE — ASSESSMENT & PLAN NOTE
Status post rucaparib break with resolution of her significant anemia  Hemoglobin is now in the 10s  Continued her baseline    We will continue to monitor

## 2022-08-22 ENCOUNTER — DOCUMENTATION (OUTPATIENT)
Dept: OTHER | Facility: HOSPITAL | Age: 67
End: 2022-08-22

## 2022-08-22 ENCOUNTER — HOSPITAL ENCOUNTER (OUTPATIENT)
Dept: INFUSION CENTER | Facility: CLINIC | Age: 67
Discharge: HOME | End: 2022-08-22
Payer: COMMERCIAL

## 2022-08-22 ENCOUNTER — NUTRITION (OUTPATIENT)
Dept: NUTRITION | Facility: CLINIC | Age: 67
End: 2022-08-22

## 2022-08-22 VITALS
BODY MASS INDEX: 25.7 KG/M2 | HEART RATE: 65 BPM | SYSTOLIC BLOOD PRESSURE: 117 MMHG | HEIGHT: 59 IN | DIASTOLIC BLOOD PRESSURE: 76 MMHG | WEIGHT: 127.5 LBS | OXYGEN SATURATION: 98 % | RESPIRATION RATE: 18 BRPM | TEMPERATURE: 97.2 F

## 2022-08-22 DIAGNOSIS — Z71.3 NUTRITIONAL COUNSELING: Primary | ICD-10-CM

## 2022-08-22 PROCEDURE — 96413 CHEMO IV INFUSION 1 HR: CPT

## 2022-08-22 PROCEDURE — J9999Q0 INV ATEZOLIZUMAB 1200MG/20ML 20 ML: Performed by: OBSTETRICS & GYNECOLOGY

## 2022-08-22 RX ADMIN — Medication 1200 MG: at 08:58

## 2022-08-22 RX ADMIN — SODIUM CHLORIDE 20 ML/HR: 9 INJECTION, SOLUTION INTRAVENOUS at 08:40

## 2022-08-22 NOTE — PATIENT INSTRUCTIONS
Nutrition Rx & Recommendations:  Diet: High Calorie, High Protein (for high calorie foods see pages 52-53, and for high protein foods see pages 49-51 in your Eating Hints book)  Small, frequent meals/snacks may be easier to tolerate than 3 large daily meals  Aim for 5-6 small meals per day (every 2-3 hours)  Include protein at all meals/snacks  Follow proper oral care; Try baking soda/salt water rinse recipe (mix 3/4 tsp salt + 1 tsp baking soda + 1 qt water; rinse with plain water after using) in Eating Hints book (pg 18)  Brush your teeth before/after meals & before bed  For sore mouth/throat: choose foods that are easy to chew/swallow; cook foods until they are soft/tender; moisten & soften foods (gravy/sauce/broth/yogurt); cut food into small pieces; drink with a straw (as tolerated); eat with a very small spoon; eat cold or room temperature food; suck on ice chips; Avoid citrus, spicy foods, tomatoes/ketchup, salty foods, raw vegetables, sharp/crunchy/hard foods & alcohol (see pages 23-28 in your Eating Hints book)  For dry mouth: sip water throughout the day; try very sweet (or tart, as tolerated) drinks; chew gum or suck on hard candy, popsicles, & ice chips; eat easy to swallow foods (such as pureed cooked foods or soups); moisten food with sauce/gravy/salad dressing; do not drink beer, wine, or any type of alcohol; keep lips moist with lip balm; avoid tobacco products & second-hand smoke; try Biotene as needed (see pages 17-18 in your Eating Hints book)  Follow proper oral care; Try baking soda/salt water rinse recipe (mix 3/4 tsp salt + 1 tsp baking soda + 1 qt water; rinse with pain water after using) in Eating Hints book (pg 18)  Brush your teeth before/after meals & before bed  Weigh yourself regularly  If you notice weight loss, make an effort to increase your daily food/calorie intake   If you continue to notice loss after these efforts, reach out to your dietitian to establish a plan to stabilize weight  Snack ideas: [place] yogurt with fruit, pudding, veggies with humus OR try greek yogurt based vegetable dip, peanut butter, grapes and cheese, fruit with a cheese stick , cottage cheese, soup, unjury, protein shakes/smoothies  Continue to keep a food journal as long as it is helpful    Patient choosen goals:   Add protein to breakfast: peanut butter on English muffin, eggs, 1/2 Premier shake    Follow Up Plan: 10/3 during infusion   Recommend Referral to Other Providers: none at this time

## 2022-08-22 NOTE — PROGRESS NOTES
Patient tolerated Tecentriq infusion without incident  Port flushed and de-accessed as per protocol  Next appointment confirmed  AVS offered and declined

## 2022-08-22 NOTE — PROGRESS NOTES
Ragini Melendez (1955) is patient#[id number] on the [facility] clinical trial  Pt was seen for treatment in the infusion center for cycle 30  Patient was given Cycle 30 Rucaparib pills and drug diary  Patient returned cycle 29 Rucaparib pills and diary  Patient provided a urine sample over the weekend  Results reviewed with Dr Rodriguez this morning  Patients urine protein creatine was 4 46  per protocol patients bevacizumab will be held for cycle 30 due to UPC over 3 5  Patient verbalized understanding  Patient has some urinary frequency but states that se will be following with urology tomorrow Tuesday 8/23/22  Patient denied any changes to conmeds since office visit prior to infusion  Patient states she feels well overall   Patient was told to call with any questions or concerns  Patient tolerated treatment without incident

## 2022-08-22 NOTE — PROGRESS NOTES
Patient presents today for treatment with Tecentriq and Avastin  Patient offers no complaints  VSS  Port accessed without incident excellent blood return noted  Labs reviewed, awaiting okay for Clinical Trials to proceed

## 2022-08-22 NOTE — PROGRESS NOTES
TIME OUT  Time out received from Shahla Santana RN in clinical trials  Please hold Avastin on 8/22/2023  UA to be done to recheck protein/creat ratio prior to cycle three  Pharmacy made aware

## 2022-08-23 ENCOUNTER — OFFICE VISIT (OUTPATIENT)
Dept: FAMILY MEDICINE CLINIC | Facility: CLINIC | Age: 67
End: 2022-08-23
Payer: COMMERCIAL

## 2022-08-23 VITALS
WEIGHT: 127 LBS | OXYGEN SATURATION: 98 % | DIASTOLIC BLOOD PRESSURE: 72 MMHG | HEIGHT: 59 IN | TEMPERATURE: 97.3 F | RESPIRATION RATE: 16 BRPM | SYSTOLIC BLOOD PRESSURE: 108 MMHG | BODY MASS INDEX: 25.6 KG/M2 | HEART RATE: 68 BPM

## 2022-08-23 DIAGNOSIS — I82.4Y1 ACUTE DEEP VEIN THROMBOSIS (DVT) OF PROXIMAL VEIN OF RIGHT LOWER EXTREMITY (HCC): ICD-10-CM

## 2022-08-23 DIAGNOSIS — I10 BENIGN ESSENTIAL HYPERTENSION: Primary | ICD-10-CM

## 2022-08-23 DIAGNOSIS — F33.9 DEPRESSION, RECURRENT (HCC): ICD-10-CM

## 2022-08-23 DIAGNOSIS — Z93.6 NEPHROSTOMY STATUS (HCC): ICD-10-CM

## 2022-08-23 DIAGNOSIS — C54.1 ENDOMETRIAL CANCER (HCC): Chronic | ICD-10-CM

## 2022-08-23 PROCEDURE — 99214 OFFICE O/P EST MOD 30 MIN: CPT | Performed by: INTERNAL MEDICINE

## 2022-08-23 NOTE — PROGRESS NOTES
Assessment/Plan:    No problem-specific Assessment & Plan notes found for this encounter  Diagnoses and all orders for this visit:    Benign essential hypertension    Acute deep vein thrombosis (DVT) of proximal vein of right lower extremity (HCC)    Endometrial cancer (HCC)    Nephrostomy status (HCC)    Depression, recurrent (HCC)          Subjective:      Patient ID: Vikram Escoto is a 61 y o  female  Pt without complains  Recently fx left shoulder  Fell due to flip flops  Sees gyn oncology dr Rolf Ferraro  To see urology and maybe to get rid of urine bag  She said had stent put in ureter  The following portions of the patient's history were reviewed and updated as appropriate: She  has a past medical history of Anemia, Chemotherapy follow-up examination, Depression, Endometrial cancer ([facility] ) (12/2017), History of chemotherapy, Hyperglycemia, Hyperlipidemia, Hypertension, and Obesity    She   Patient Active Problem List    Diagnosis Date Noted    Vaginal atrophy 07/21/2022    Vulvar irritation 07/21/2022    Drug-induced anemia 07/08/2022    Burning with urination 07/08/2022    Continuous opioid dependence ([facility] ) 04/14/2022    Depression, recurrent ([facility] ) 04/14/2022    Cachexia ([facility] ) 10/06/2021    Chronic UTI 08/06/2021    Secondary malignant neoplasm of other specified sites ([facility] ) 07/07/2021    Closed nondisplaced fracture of right pubis with delayed healing 05/14/2021    Exertional dyspnea 04/02/2021    Need for follow-up by  03/12/2021    Dental disorder 01/29/2021    Drug induced constipation 01/08/2021    Osteoporosis 11/30/2020    Pathological fracture due to osteoporosis 11/25/2020    Bilateral piriformis syndrome     Chronic pain syndrome     Myofascial pain syndrome     Nephrostomy status ([facility] ) 10/14/2020    Overactive bladder 08/06/2020    Acute left-sided low back pain without sciatica 08/06/2020    Obstruction of right ureter 01/03/2020    Hypomagnesemia 10/18/2019    Ambulatory dysfunction 10/11/2019    Moderate protein-calorie malnutrition ([facility] [address] ) 10/03/2019    Bilateral lower extremity edema 09/30/2019    Generalized weakness 09/29/2019    Hypokalemia 09/19/2019    Hyponatremia 09/19/2019    Chemotherapy-induced nausea 09/18/2019    Anemia 09/06/2019    Chemotherapy induced neutropenia ([facility] ) 08/30/2019    Cancer related pain 07/19/2019    Encounter for central line care 07/16/2019    Acute deep vein thrombosis (DVT) of proximal vein of lower extremity ([facility] [address] ) 06/19/2019    Breast cancer screening 06/19/2019    Benign essential hypertension 12/19/2017    [facility] 11/29/2017    Dyslipidemia 04/01/2014    Major depression, chronic 10/07/2013     She  has a past surgical history that includes Abdominal surgery; Colonoscopy; pr lap, radical hyst w/ tube&ov, node bx (N/A, 12/19/2017); pr lap,diagnostic abdomen (N/A, 12/19/2017); Cholecystectomy; Gastric bypass; Tonsillectomy; Hysterectomy (Bilateral, 2017); Oophorectomy (Bilateral, 2017); US guided breast biopsy right complete (Right, 06/28/2019); CT needle biopsy lymph node (07/08/2019); IR port placement (07/26/2019); IR nephrostomy tube placement (07/26/2019); IR port removal (09/20/2019); IR PICC line (09/27/2019); pr insj tunneled ctr vad w/subq port age 13 yr/> (Left, 11/12/2019); FL guided central venous access device insertion (11/12/2019); IR nephroureteral stent check/change/reposition (04/06/2021); IR nephroureteral stent check/change/reposition (06/04/2021); IR nephroureteral stent check/change/reposition (09/03/2021); IR nephroureteral stent check/change/reposition (12/07/2021); IR nephroureteral stent check/change/reposition (03/08/2022); IR nephroureteral stent check/change/reposition (05/10/2022); IR nephrostomy tube check/change/reposition/reinsertion/upsize (05/27/2022);  IR nephrostomy to nephroureteral stent (06/09/2022); and Breast biopsy (Right, 06/28/2019)  Her family history includes Bone cancer in her maternal grandfather; Breast cancer (age of onset: 61) in her sister; Lymphoma in her father; No Known Problems in her brother, brother, maternal aunt, maternal aunt, maternal aunt, maternal aunt, maternal grandmother, paternal aunt, paternal aunt, paternal aunt, paternal aunt, and paternal grandfather; Other in her mother; Ovarian cancer (age of onset: 48) in her mother; Uterine cancer in her paternal grandmother  She  reports that she has never smoked  She has never used smokeless tobacco  She reports previous alcohol use  She reports that she does not use drugs  Current Outpatient Medications   Medication Sig Dispense Refill    ARIPiprazole (ABILIFY) 20 MG tablet Take 20 mg by mouth in the morning   aspirin (ECOTRIN LOW STRENGTH) 81 mg EC tablet Take 81 mg by mouth daily      Calcium-Magnesium-Vitamin D (CALCIUM 500 PO) Take by mouth daily       cholecalciferol (VITAMIN D3) 1,000 units tablet Take 1,000 Units by mouth daily      Cranberry-Vitamin C 250-60 MG CAPS Take 1 tablet by mouth in the morning 90 capsule 3    Cyanocobalamin (VITAMIN B12 PO) Take by mouth daily       dronabinol (MARINOL) 2 5 mg capsule Take 1 capsule (2 5 mg total) by mouth 2 (two) times a day before meals (Patient taking differently: Take 2 5 mg by mouth 2 (two) times a day before meals As needed) 180 capsule 0    enoxaparin (LOVENOX) 80 mg/0 8 mL Inject 0 8 mL (80 mg total) under the skin every 24 hours 72 mL 1    estradiol (ESTRACE VAGINAL) 0 1 mg/g vaginal cream Insert 1g nightly for 6 weeks, then 1-2x weekly  02 3 g 1    folic acid (FOLVITE) 1 mg tablet Take 1 tablet (1 mg total) by mouth daily  0    gemfibrozil (LOPID) 600 mg tablet TAKE 1 TABLET BY MOUTH EVERY DAY 90 tablet 0    Multiple Vitamin (MULTIVITAMIN) tablet Take 1 tablet by mouth daily      naloxone (NARCAN) 4 mg/0 1 mL nasal spray Administer 1 spray into a nostril   If no response after 2-3 minutes, give another dose in the other nostril using a new spray  1 each 0    omeprazole (PriLOSEC) 20 mg delayed release capsule Take 20 mg by mouth daily      ondansetron (ZOFRAN) 8 mg tablet Take 1 tablet (8 mg total) by mouth every 8 (eight) hours as needed for nausea or vomiting 30 tablet 1    oxybutynin (DITROPAN XL) 15 MG 24 hr tablet TAKE 1 TABLET BY MOUTH DAILY AT BEDTIME 90 tablet 3    rucaparib (RUBRACA) 300 mg tablet Take 600 mg by mouth every 12 (twelve) hours Take with or without food  Do not repeat a vomited dose   saccharomyces boulardii (FLORASTOR) 250 mg capsule Take 1 capsule (250 mg total) by mouth 2 (two) times a day  0    senna (SENOKOT) 8 6 MG tablet Take 1 tablet (8 6 mg total) by mouth 2 (two) times a day as needed for constipation 60 tablet 0    venlafaxine (EFFEXOR) 75 mg tablet Take 75 mg by mouth 3 (three) times a day        clotrimazole-betamethasone (LOTRISONE) 1-0 05 % cream Apply topically 2 (two) times a day (Patient not taking: Reported on 8/23/2022) 30 g 0    LORazepam (ATIVAN) 0 5 mg tablet Take 1 tablet (0 5 mg total) by mouth every 6 (six) hours as needed for anxiety (or nausea) (Patient not taking: No sig reported) 36 tablet 1     No current facility-administered medications for this visit  Facility-Administered Medications Ordered in Other Visits   Medication Dose Route Frequency Provider Last Rate Last Admin    INV bevacizumab (INVESTIGATIONAL) 858 mg in sodium chloride 0 9 % 65 68 mL IVPB  858 mg Intravenous Once Caryn Parada MD        sodium chloride 0 9 % infusion  20 mL/hr Intravenous Continuous Caryn Parada MD   Stopped at 08/22/22 0150     Current Outpatient Medications on File Prior to Visit   Medication Sig    ARIPiprazole (ABILIFY) 20 MG tablet Take 20 mg by mouth in the morning      aspirin (ECOTRIN LOW STRENGTH) 81 mg EC tablet Take 81 mg by mouth daily    Calcium-Magnesium-Vitamin D (CALCIUM 500 PO) Take by mouth daily     cholecalciferol (VITAMIN D3) 1,000 units tablet Take 1,000 Units by mouth daily    Cranberry-Vitamin C 250-60 MG CAPS Take 1 tablet by mouth in the morning    Cyanocobalamin (VITAMIN B12 PO) Take by mouth daily     dronabinol (MARINOL) 2 5 mg capsule Take 1 capsule (2 5 mg total) by mouth 2 (two) times a day before meals (Patient taking differently: Take 2 5 mg by mouth 2 (two) times a day before meals As needed)    enoxaparin (LOVENOX) 80 mg/0 8 mL Inject 0 8 mL (80 mg total) under the skin every 24 hours    estradiol (ESTRACE VAGINAL) 0 1 mg/g vaginal cream Insert 1g nightly for 6 weeks, then 1-2x weekly   folic acid (FOLVITE) 1 mg tablet Take 1 tablet (1 mg total) by mouth daily    gemfibrozil (LOPID) 600 mg tablet TAKE 1 TABLET BY MOUTH EVERY DAY    Multiple Vitamin (MULTIVITAMIN) tablet Take 1 tablet by mouth daily    naloxone (NARCAN) 4 mg/0 1 mL nasal spray Administer 1 spray into a nostril  If no response after 2-3 minutes, give another dose in the other nostril using a new spray   omeprazole (PriLOSEC) 20 mg delayed release capsule Take 20 mg by mouth daily    ondansetron (ZOFRAN) 8 mg tablet Take 1 tablet (8 mg total) by mouth every 8 (eight) hours as needed for nausea or vomiting    oxybutynin (DITROPAN XL) 15 MG 24 hr tablet TAKE 1 TABLET BY MOUTH DAILY AT BEDTIME    rucaparib (RUBRACA) 300 mg tablet Take 600 mg by mouth every 12 (twelve) hours Take with or without food  Do not repeat a vomited dose      saccharomyces boulardii (FLORASTOR) 250 mg capsule Take 1 capsule (250 mg total) by mouth 2 (two) times a day    senna (SENOKOT) 8 6 MG tablet Take 1 tablet (8 6 mg total) by mouth 2 (two) times a day as needed for constipation    venlafaxine (EFFEXOR) 75 mg tablet Take 75 mg by mouth 3 (three) times a day      clotrimazole-betamethasone (LOTRISONE) 1-0 05 % cream Apply topically 2 (two) times a day (Patient not taking: Reported on 8/23/2022)    LORazepam (ATIVAN) 0 5 mg tablet Take 1 tablet (0 5 mg total) by mouth every 6 (six) hours as needed for anxiety (or nausea) (Patient not taking: No sig reported)    [DISCONTINUED] acetaminophen-codeine (TYLENOL #3) 300-30 mg per tablet Take 1 tablet by mouth every 6 (six) hours as needed for moderate pain (Patient not taking: No sig reported)    [DISCONTINUED] lactulose (CHRONULAC) 10 g/15 mL solution TAKE 45 ML (30 G TOTAL) BY MOUTH 2 (TWO) TIMES A DAY as needed (Patient not taking: Reported on 8/23/2022)    [DISCONTINUED] nitrofurantoin (MACROBID) 100 mg capsule Take 1 capsule (100 mg total) by mouth 2 (two) times a day (Patient not taking: Reported on 8/23/2022)    [DISCONTINUED] oxyCODONE-acetaminophen (Percocet) 5-325 mg per tablet Take 1 tablet by mouth every 4 (four) hours as needed for moderate pain Max Daily Amount: 6 tablets (Patient not taking: Reported on 8/23/2022)    [DISCONTINUED] phenazopyridine (PYRIDIUM) 200 mg tablet Take 1 tablet (200 mg total) by mouth daily as needed for bladder spasms (Patient not taking: Reported on 8/23/2022)     Current Facility-Administered Medications on File Prior to Visit   Medication    INV bevacizumab (INVESTIGATIONAL) 858 mg in sodium chloride 0 9 % 65 68 mL IVPB    sodium chloride 0 9 % infusion     She is allergic to cephalosporins       Review of Systems   Constitutional: Negative  Negative for chills and fever  HENT: Negative  Respiratory: Negative  Cardiovascular: Negative  Objective:      /72 (BP Location: Right arm, Patient Position: Sitting, Cuff Size: Standard)   Pulse 68   Temp (!) 97 3 °F (36 3 °C) (Temporal)   Resp 16   Ht 4' 11" (1 499 m)   Wt 57 6 kg (127 lb)   LMP  (LMP Unknown)   SpO2 98%   BMI 25 65 kg/m²          Physical Exam  Constitutional:       Appearance: Normal appearance  HENT:      Head: Normocephalic and atraumatic  Cardiovascular:      Rate and Rhythm: Normal rate and regular rhythm     Pulmonary:      Effort: Pulmonary effort is normal       Breath sounds: Normal breath sounds  Musculoskeletal:      Cervical back: Neck supple  Lymphadenopathy:      Cervical: No cervical adenopathy  Neurological:      Mental Status: She is alert

## 2022-08-26 DIAGNOSIS — R11.0 CHEMOTHERAPY-INDUCED NAUSEA: ICD-10-CM

## 2022-08-26 DIAGNOSIS — R63.0 ANOREXIA: ICD-10-CM

## 2022-08-26 DIAGNOSIS — T45.1X5A CHEMOTHERAPY-INDUCED NAUSEA: ICD-10-CM

## 2022-08-26 DIAGNOSIS — R64 CACHEXIA (HCC): ICD-10-CM

## 2022-08-26 RX ORDER — DRONABINOL 2.5 MG/1
2.5 CAPSULE ORAL
Qty: 30 CAPSULE | Refills: 0 | Status: SHIPPED | OUTPATIENT
Start: 2022-08-26

## 2022-08-26 NOTE — TELEPHONE ENCOUNTER
Looks like a script was sent to [facility] for mail delivery on 8/3  No recent fills on PDMP since January  No other red flags  Will send 2 week supply to [facility] today  However, will need to investigate 1) whether [facility] can fill this controlled substance and 2) if so, whether it shows up on PDMP

## 2022-08-26 NOTE — TELEPHONE ENCOUNTER
Primary palliative medicine provider:   alicia    Medication requested: If for pain, how has the patient been taking their pain medicine?      Last appointment:    Next scheduled appointment: 09/27    PDMP review:  Not reviewed

## 2022-08-30 ENCOUNTER — APPOINTMENT (OUTPATIENT)
Dept: RADIOLOGY | Facility: MEDICAL CENTER | Age: 67
End: 2022-08-30
Payer: COMMERCIAL

## 2022-08-30 ENCOUNTER — OFFICE VISIT (OUTPATIENT)
Dept: OBGYN CLINIC | Facility: MEDICAL CENTER | Age: 67
End: 2022-08-30

## 2022-08-30 VITALS
HEIGHT: 59 IN | HEART RATE: 79 BPM | BODY MASS INDEX: 25.8 KG/M2 | WEIGHT: 128 LBS | RESPIRATION RATE: 17 BRPM | SYSTOLIC BLOOD PRESSURE: 112 MMHG | DIASTOLIC BLOOD PRESSURE: 76 MMHG

## 2022-08-30 DIAGNOSIS — S42.295D OTHER CLOSED NONDISPLACED FRACTURE OF PROXIMAL END OF LEFT HUMERUS WITH ROUTINE HEALING, SUBSEQUENT ENCOUNTER: Primary | ICD-10-CM

## 2022-08-30 DIAGNOSIS — S42.295D OTHER CLOSED NONDISPLACED FRACTURE OF PROXIMAL END OF LEFT HUMERUS WITH ROUTINE HEALING, SUBSEQUENT ENCOUNTER: ICD-10-CM

## 2022-08-30 PROCEDURE — 73030 X-RAY EXAM OF SHOULDER: CPT

## 2022-08-30 PROCEDURE — 99024 POSTOP FOLLOW-UP VISIT: CPT | Performed by: ORTHOPAEDIC SURGERY

## 2022-09-06 ENCOUNTER — HOSPITAL ENCOUNTER (OUTPATIENT)
Dept: NON INVASIVE DIAGNOSTICS | Facility: CLINIC | Age: 67
Discharge: HOME | End: 2022-09-06
Payer: COMMERCIAL

## 2022-09-06 ENCOUNTER — OFFICE VISIT (OUTPATIENT)
Dept: LAB | Facility: CLINIC | Age: 67
End: 2022-09-06
Payer: COMMERCIAL

## 2022-09-06 VITALS
DIASTOLIC BLOOD PRESSURE: 72 MMHG | HEIGHT: 59 IN | HEART RATE: 68 BPM | BODY MASS INDEX: 25.6 KG/M2 | SYSTOLIC BLOOD PRESSURE: 108 MMHG | WEIGHT: 127 LBS

## 2022-09-06 DIAGNOSIS — C54.1 ENDOMETRIAL CANCER (HCC): ICD-10-CM

## 2022-09-06 LAB
AORTIC ROOT: 3.6 CM
APICAL FOUR CHAMBER EJECTION FRACTION: 59 %
ASCENDING AORTA: 3.6 CM
FRACTIONAL SHORTENING: 28 % (ref 28–44)
GLOBAL LONGITUIDAL STRAIN: -21 %
INTERVENTRICULAR SEPTUM IN DIASTOLE (PARASTERNAL SHORT AXIS VIEW): 1.7 CM
INTERVENTRICULAR SEPTUM: 1.7 CM (ref 0.6–1.1)
LAAS-AP2: 14.8 CM2
LAAS-AP4: 13.5 CM2
LEFT ATRIUM SIZE: 4.1 CM
LEFT INTERNAL DIMENSION IN SYSTOLE: 2.3 CM (ref 2.1–4)
LEFT VENTRICULAR INTERNAL DIMENSION IN DIASTOLE: 3.2 CM (ref 3.5–6)
LEFT VENTRICULAR POSTERIOR WALL IN END DIASTOLE: 1.7 CM
LEFT VENTRICULAR STROKE VOLUME: 22 ML
LVSV (TEICH): 22 ML
PULMONARY REGURGITATION LATE DIASTOLIC VELOCITY: 0.01 M/S
RIGHT ATRIUM AREA SYSTOLE A4C: 7.5 CM2
RIGHT VENTRICLE ID DIMENSION: 2.8 CM
SL CV LEFT ATRIUM LENGTH A2C: 4.5 CM
SL CV LV EF: 65
SL CV PED ECHO LEFT VENTRICLE DIASTOLIC VOLUME (MOD BIPLANE) 2D: 41 ML
SL CV PED ECHO LEFT VENTRICLE SYSTOLIC VOLUME (MOD BIPLANE) 2D: 19 ML
TR MAX PG: 26 MMHG
TR PEAK VELOCITY: 2.6 M/S
TRICUSPID VALVE PEAK REGURGITATION VELOCITY: 2.57 M/S

## 2022-09-06 PROCEDURE — 93325 DOPPLER ECHO COLOR FLOW MAPG: CPT | Performed by: INTERNAL MEDICINE

## 2022-09-06 PROCEDURE — 93005 ELECTROCARDIOGRAM TRACING: CPT

## 2022-09-06 PROCEDURE — 93308 TTE F-UP OR LMTD: CPT

## 2022-09-06 PROCEDURE — 93308 TTE F-UP OR LMTD: CPT | Performed by: INTERNAL MEDICINE

## 2022-09-06 PROCEDURE — 93321 DOPPLER ECHO F-UP/LMTD STD: CPT | Performed by: INTERNAL MEDICINE

## 2022-09-07 ENCOUNTER — OFFICE VISIT (OUTPATIENT)
Dept: UROLOGY | Facility: AMBULATORY SURGERY CENTER | Age: 67
End: 2022-09-07
Payer: COMMERCIAL

## 2022-09-07 VITALS
SYSTOLIC BLOOD PRESSURE: 130 MMHG | WEIGHT: 127 LBS | OXYGEN SATURATION: 97 % | DIASTOLIC BLOOD PRESSURE: 82 MMHG | BODY MASS INDEX: 25.6 KG/M2 | HEART RATE: 72 BPM | HEIGHT: 59 IN

## 2022-09-07 DIAGNOSIS — N39.0 RECURRENT UTI: Primary | ICD-10-CM

## 2022-09-07 DIAGNOSIS — C54.1 ENDOMETRIAL CANCER (HCC): Primary | ICD-10-CM

## 2022-09-07 LAB
ATRIAL RATE: 53 BPM
P AXIS: 32 DEGREES
PR INTERVAL: 148 MS
QRS AXIS: -33 DEGREES
QRSD INTERVAL: 96 MS
QT INTERVAL: 444 MS
QTC INTERVAL: 416 MS
T WAVE AXIS: 15 DEGREES
VENTRICULAR RATE: 53 BPM

## 2022-09-07 PROCEDURE — 3079F DIAST BP 80-89 MM HG: CPT | Performed by: UROLOGY

## 2022-09-07 PROCEDURE — 99214 OFFICE O/P EST MOD 30 MIN: CPT | Performed by: UROLOGY

## 2022-09-07 PROCEDURE — 93010 ELECTROCARDIOGRAM REPORT: CPT | Performed by: INTERNAL MEDICINE

## 2022-09-07 PROCEDURE — 3075F SYST BP GE 130 - 139MM HG: CPT | Performed by: UROLOGY

## 2022-09-07 NOTE — PROGRESS NOTES
9/7/2022    Ragini Melendez  1955  [id number]        Assessment  Right hydronephrosis secondary to endometrial carcinoma, status post right nephrostomy tube insertion      Discussion  I had a lengthy discussion with Anita Mcintosh and her sister in the office today  I recommend that at her next stent exchange on September 19, 2022 that they maintain the right nephro ureteral stent in place and we can then capped this 2  If she tolerates a cap trial she can then return to Interventional Radiology where the nephroureteral stent can be internalized to a double-J ureteral stent  If she could tolerate this then I can simply exchange the stent in the operating room every 4-6 months in a routine fashion  With the nephroureteral stent capped she will require close monitoring and a follow-up creatinine level  The patient her sister are amenable with this plan  History of Present Illness  74 y o  female with a history of recurrent stage I B endometrial carcinoma managed by Gynecological Oncology  She is currently receiving atezolizumab  Her last nephroureteral stent exchange was in June of 2022  Her next scheduled changes September 19, 2022  She presents today to discuss options for her right nephroureteral stent  Her most recent CT scan from August 16, 2022 shows a small right subcapsular fluid collection with a right nephroureteral stent in place  CT scan is otherwise stable with regards to her very mild retrocrural/mediastinal adenopathy which is unchanged from prior  AUA Symptom Score      Review of Systems  Review of Systems   Constitutional: Negative  HENT: Negative  Eyes: Negative  Respiratory: Negative  Cardiovascular: Negative  Gastrointestinal: Negative  Endocrine: Negative  Genitourinary:        Per HPI   Musculoskeletal: Negative  Skin: Negative  Allergic/Immunologic: Negative  Neurological: Negative  Hematological: Negative  Psychiatric/Behavioral: Negative  Past Medical History  Past Medical History:   Diagnosis Date    Anemia     Chemotherapy follow-up examination     Depression     Endometrial cancer ([facility] [address] ) 12/2017    History of chemotherapy     started 12/2021endometrial cancer    Hyperglycemia     vx type 2 dm -- last assessed 4/1/14; resolved 11/7/17    Hyperlipidemia     Hypertension     Obesity     last assessed 10/14/17; resolved 11/7/17       Past Social History  Past Surgical History:   Procedure Laterality Date    ABDOMINAL SURGERY      GASTRIC BYPASS    BREAST BIOPSY Right 06/28/2019    core biopsy; benign    CHOLECYSTECTOMY      at the time of gastric bypass    COLONOSCOPY      CT NEEDLE BIOPSY LYMPH NODE  07/08/2019    FL GUIDED CENTRAL VENOUS ACCESS DEVICE INSERTION  11/12/2019    GASTRIC BYPASS      HYSTERECTOMY Bilateral 2017    total abdominal with salpingo-oophorectomy    IR NEPHROSTOMY TO NEPHROURETERAL STENT  06/09/2022    IR NEPHROSTOMY TUBE CHECK/CHANGE/REPOSITION/REINSERTION/UPSIZE  05/27/2022    IR NEPHROSTOMY TUBE PLACEMENT  07/26/2019    IR NEPHROURETERAL STENT CHECK/CHANGE/REPOSITION  04/06/2021    IR NEPHROURETERAL STENT CHECK/CHANGE/REPOSITION  06/04/2021    IR NEPHROURETERAL STENT CHECK/CHANGE/REPOSITION  09/03/2021    IR NEPHROURETERAL STENT CHECK/CHANGE/REPOSITION  12/07/2021    IR NEPHROURETERAL STENT CHECK/CHANGE/REPOSITION  03/08/2022    IR NEPHROURETERAL STENT CHECK/CHANGE/REPOSITION  05/10/2022    IR PICC LINE  09/27/2019    IR PORT PLACEMENT  07/26/2019    IR PORT REMOVAL  09/20/2019    OOPHORECTOMY Bilateral 2017    LA INSJ TUNNELED CTR VAD W/SUBQ PORT AGE 7 YR/> Left 11/12/2019    Procedure: INSERTION VENOUS PORT ( PORT-A-CATH) IR;  Surgeon: Mone Garcia DO;  [facility]: [facility];  Service: Interventional Radiology    LA LAP, RADICAL HYST W/ TUBE&OV, NODE BX N/A 12/19/2017    Procedure: HYSTERECTOMY LAPAROSCOPIC TOTAL ([address]) W/ ROBOTICS; BILATERAL SALPINGOOPHERECTOMY; LYMPH NODE DISSECTION; lysis of adhesions;  Surgeon: Virgil Dias MD;  [facility]: [facility];  Service: Gynecology Oncology    DE LAP,DIAGNOSTIC ABDOMEN N/A 12/19/2017    Procedure: LAPAROSCOPY DIAGNOSTIC;  Surgeon: Virgil Dias MD;  [facility];  Service: Gynecology Oncology    TONSILLECTOMY      US GUIDED BREAST BIOPSY RIGHT COMPLETE Right 06/28/2019       Past Family History  Family History   Problem Relation Age of Onset    Other Mother         dyslipidemia    Ovarian cancer Mother 48    Lymphoma Father     Breast cancer Sister 61    No Known Problems Maternal Grandmother     Bone cancer Maternal Grandfather     Uterine cancer Paternal Grandmother     No Known Problems Paternal Grandfather     No Known Problems Brother     No Known Problems Brother     No Known Problems Maternal Aunt     No Known Problems Maternal Aunt     No Known Problems Maternal Aunt     No Known Problems Maternal Aunt     No Known Problems Paternal Aunt     No Known Problems Paternal Aunt     No Known Problems Paternal Aunt     No Known Problems Paternal Aunt        Past Social history  Social History     Socioeconomic History    Marital status: Single     Spouse name: Not on file    Number of children: Not on file    Years of education: Not on file    Highest education level: Not on file   Occupational History    Not on file   Tobacco Use    Smoking status: Never Smoker    Smokeless tobacco: Never Used   Vaping Use    Vaping Use: Never used   Substance and Sexual Activity    Alcohol use: Not Currently    Drug use: No    Sexual activity: Not Currently   Other Topics Concern    Not on file   Social History Narrative    Not on file     Social Determinants of Health     Financial Resource Strain: Not on file   Food Insecurity: Not on file   Transportation Needs: Not on file   Physical Activity: Not on file   Stress: Not on file   Social Connections: Not on file   Intimate Partner Violence: Not on file Housing Stability: Not on file       Current Medications  Current Outpatient Medications   Medication Sig Dispense Refill    ARIPiprazole (ABILIFY) 20 MG tablet Take 20 mg by mouth in the morning   aspirin (ECOTRIN LOW STRENGTH) 81 mg EC tablet Take 81 mg by mouth daily      Calcium-Magnesium-Vitamin D (CALCIUM 500 PO) Take by mouth daily       cholecalciferol (VITAMIN D3) 1,000 units tablet Take 1,000 Units by mouth daily      Cranberry-Vitamin C 250-60 MG CAPS Take 1 tablet by mouth in the morning 90 capsule 3    Cyanocobalamin (VITAMIN B12 PO) Take by mouth daily       dronabinol (MARINOL) 2 5 mg capsule Take 1 capsule (2 5 mg total) by mouth 2 (two) times a day before meals 30 capsule 0    enoxaparin (LOVENOX) 80 mg/0 8 mL Inject 0 8 mL (80 mg total) under the skin every 24 hours 72 mL 1    estradiol (ESTRACE VAGINAL) 0 1 mg/g vaginal cream Insert 1g nightly for 6 weeks, then 1-2x weekly  83 9 g 1    folic acid (FOLVITE) 1 mg tablet Take 1 tablet (1 mg total) by mouth daily  0    gemfibrozil (LOPID) 600 mg tablet TAKE 1 TABLET BY MOUTH EVERY DAY 90 tablet 0    Multiple Vitamin (MULTIVITAMIN) tablet Take 1 tablet by mouth daily      naloxone (NARCAN) 4 mg/0 1 mL nasal spray Administer 1 spray into a nostril  If no response after 2-3 minutes, give another dose in the other nostril using a new spray  1 each 0    omeprazole (PriLOSEC) 20 mg delayed release capsule Take 20 mg by mouth daily      ondansetron (ZOFRAN) 8 mg tablet Take 1 tablet (8 mg total) by mouth every 8 (eight) hours as needed for nausea or vomiting 30 tablet 1    oxybutynin (DITROPAN XL) 15 MG 24 hr tablet TAKE 1 TABLET BY MOUTH DAILY AT BEDTIME 90 tablet 3    rucaparib (RUBRACA) 300 mg tablet Take 600 mg by mouth every 12 (twelve) hours Take with or without food  Do not repeat a vomited dose        saccharomyces boulardii (FLORASTOR) 250 mg capsule Take 1 capsule (250 mg total) by mouth 2 (two) times a day  0    senna (SENOKOT) 8 6 MG tablet Take 1 tablet (8 6 mg total) by mouth 2 (two) times a day as needed for constipation 60 tablet 0    venlafaxine (EFFEXOR) 75 mg tablet Take 75 mg by mouth 3 (three) times a day        clotrimazole-betamethasone (LOTRISONE) 1-0 05 % cream Apply topically 2 (two) times a day (Patient not taking: No sig reported) 30 g 0    LORazepam (ATIVAN) 0 5 mg tablet Take 1 tablet (0 5 mg total) by mouth every 6 (six) hours as needed for anxiety (or nausea) (Patient not taking: No sig reported) 36 tablet 1     No current facility-administered medications for this visit  Allergies  Allergies   Allergen Reactions    Cephalosporins Rash     Which Cephalosporin reaction was to not specified; however, has tolerated Amoxicillin, Cefazolin, and Cefepime       Past Medical History, Social History, Family History, medications and allergies were reviewed  Vitals  Vitals:    09/07/22 1407   BP: 130/82   Pulse: 72   SpO2: 97%   Weight: 57 6 kg (127 lb)   Height: 4' 11" (1 499 m)       Physical Exam  Physical Exam  On examination she is in no acute distress  Her abdomen is soft nontender nondistended  A right nephroureteral stent is in place  The urine in the bag has mild hematuria  The insertion site is clean dry and intact  Gait normal   Affect normal   Left arm in a sling        Results  No results found for: PSA  Lab Results   Component Value Date    GLUCOSE 219 (H) 12/19/2017    CALCIUM 9 2 08/19/2022     10/27/2017    K 3 8 08/19/2022    CO2 23 08/19/2022     08/19/2022    BUN 26 (H) 08/19/2022    CREATININE 0 91 08/19/2022     Lab Results   Component Value Date    WBC 6 65 08/19/2022    HGB 10 5 (L) 08/19/2022    HCT 33 8 (L) 08/19/2022     (H) 08/19/2022     08/19/2022         Office Urine Dip  No results found for this or any previous visit (from the past 1 hour(s)) ]

## 2022-09-07 NOTE — LETTER
September 7, 2022     Demetrius Segal DO  [address]    Patient: Uche Vargas   YOB: 1955   Date of Visit: 9/7/2022       Dear Dr Gemma Reich:    Thank you for referring Uche Vargas to me for evaluation  Below are my notes for this consultation  If you have questions, please do not hesitate to call me  I look forward to following your patient along with you  Sincerely,        Deana Kothari MD        CC: MD Deana Cole MD  9/7/2022  2:57 PM  Sign when Signing Visit  9/7/2022    Ragini Moore Leonard  1955  [id number]        Assessment  Right hydronephrosis secondary to endometrial carcinoma, status post right nephrostomy tube insertion      Discussion  I had a lengthy discussion with Matthew Vides and her sister in the office today  I recommend that at her next stent exchange on September 19, 2022 that they maintain the right nephro ureteral stent in place and we can then capped this 2  If she tolerates a cap trial she can then return to Interventional Radiology where the nephroureteral stent can be internalized to a double-J ureteral stent  If she could tolerate this then I can simply exchange the stent in the operating room every 4-6 months in a routine fashion  With the nephroureteral stent capped she will require close monitoring and a follow-up creatinine level  The patient her sister are amenable with this plan  History of Present Illness  74 y o  female with a history of recurrent stage I B endometrial carcinoma managed by Gynecological Oncology  She is currently receiving atezolizumab  Her last nephroureteral stent exchange was in June of 2022  Her next scheduled changes September 19, 2022  She presents today to discuss options for her right nephroureteral stent    Her most recent CT scan from August 16, 2022 shows a small right subcapsular fluid collection with a right nephroureteral stent in place   CT scan is otherwise stable with regards to her very mild retrocrural/mediastinal adenopathy which is unchanged from prior  AUA Symptom Score      Review of Systems  Review of Systems   Constitutional: Negative  HENT: Negative  Eyes: Negative  Respiratory: Negative  Cardiovascular: Negative  Gastrointestinal: Negative  Endocrine: Negative  Genitourinary:        Per HPI   Musculoskeletal: Negative  Skin: Negative  Allergic/Immunologic: Negative  Neurological: Negative  Hematological: Negative  Psychiatric/Behavioral: Negative            Past Medical History  Past Medical History:   Diagnosis Date    Anemia     Chemotherapy follow-up examination     Depression     Endometrial cancer ([facility] [address] ) 12/2017    History of chemotherapy     started 12/2021endometrial cancer    Hyperglycemia     vx type 2 dm -- last assessed 4/1/14; resolved 11/7/17    Hyperlipidemia     Hypertension     Obesity     last assessed 10/14/17; resolved 11/7/17       Past Social History  Past Surgical History:   Procedure Laterality Date    ABDOMINAL SURGERY      GASTRIC BYPASS    BREAST BIOPSY Right 06/28/2019    core biopsy; benign    CHOLECYSTECTOMY      at the time of gastric bypass    COLONOSCOPY      CT NEEDLE BIOPSY LYMPH NODE  07/08/2019    FL GUIDED CENTRAL VENOUS ACCESS DEVICE INSERTION  11/12/2019    GASTRIC BYPASS      HYSTERECTOMY Bilateral 2017    total abdominal with salpingo-oophorectomy    IR NEPHROSTOMY TO NEPHROURETERAL STENT  06/09/2022    IR NEPHROSTOMY TUBE CHECK/CHANGE/REPOSITION/REINSERTION/UPSIZE  05/27/2022    IR NEPHROSTOMY TUBE PLACEMENT  07/26/2019    IR NEPHROURETERAL STENT CHECK/CHANGE/REPOSITION  04/06/2021    IR NEPHROURETERAL STENT CHECK/CHANGE/REPOSITION  06/04/2021    IR NEPHROURETERAL STENT CHECK/CHANGE/REPOSITION  09/03/2021    IR NEPHROURETERAL STENT CHECK/CHANGE/REPOSITION  12/07/2021    IR NEPHROURETERAL STENT CHECK/CHANGE/REPOSITION 03/08/2022    IR NEPHROURETERAL STENT CHECK/CHANGE/REPOSITION  05/10/2022    IR PICC LINE  09/27/2019    IR PORT PLACEMENT  07/26/2019    IR PORT REMOVAL  09/20/2019    OOPHORECTOMY Bilateral 2017    IA INSJ TUNNELED CTR VAD W/SUBQ PORT AGE 5 YR/> Left 11/12/2019    Procedure: INSERTION VENOUS PORT ( PORT-A-CATH) IR;  Surgeon: Yodit Lafleur DO;  [facility];  Service: Interventional Radiology    IA LAP, RADICAL HYST W/ TUBE&OV, NODE BX N/A 12/19/2017    Procedure: HYSTERECTOMY LAPAROSCOPIC TOTAL ([address]) W/ ROBOTICS; BILATERAL SALPINGOOPHERECTOMY; LYMPH NODE DISSECTION; lysis of adhesions;  Surgeon: Caleb Alonso MD;  [facility];  Service: Gynecology Oncology    IA LAP,DIAGNOSTIC ABDOMEN N/A 12/19/2017    Procedure: LAPAROSCOPY DIAGNOSTIC;  Surgeon: Caleb Alonso MD;  [facility];  Service: Gynecology Oncology    TONSILLECTOMY      US GUIDED BREAST BIOPSY RIGHT COMPLETE Right 06/28/2019       Past Family History  Family History   Problem Relation Age of Onset    Other Mother         dyslipidemia    Ovarian cancer Mother 48    Lymphoma Father     Breast cancer Sister 61    No Known Problems Maternal Grandmother     Bone cancer Maternal Grandfather     Uterine cancer Paternal Grandmother     No Known Problems Paternal Grandfather     No Known Problems Brother     No Known Problems Brother     No Known Problems Maternal Aunt     No Known Problems Maternal Aunt     No Known Problems Maternal Aunt     No Known Problems Maternal Aunt     No Known Problems Paternal Aunt     No Known Problems Paternal Aunt     No Known Problems Paternal Aunt     No Known Problems Paternal Aunt        Past Social history  Social History     Socioeconomic History    Marital status: Single     Spouse name: Not on file    Number of children: Not on file    Years of education: Not on file    Highest education level: Not on file   Occupational History    Not on file   Tobacco Use    Smoking status: Never Smoker    Smokeless tobacco: Never Used   Vaping Use    Vaping Use: Never used   Substance and Sexual Activity    Alcohol use: Not Currently    Drug use: No    Sexual activity: Not Currently   Other Topics Concern    Not on file   Social History Narrative    Not on file     Social Determinants of Health     Financial Resource Strain: Not on file   Food Insecurity: Not on file   Transportation Needs: Not on file   Physical Activity: Not on file   Stress: Not on file   Social Connections: Not on file   Intimate Partner Violence: Not on file   Housing Stability: Not on file       Current Medications  Current Outpatient Medications   Medication Sig Dispense Refill    ARIPiprazole (ABILIFY) 20 MG tablet Take 20 mg by mouth in the morning   aspirin (ECOTRIN LOW STRENGTH) 81 mg EC tablet Take 81 mg by mouth daily      Calcium-Magnesium-Vitamin D (CALCIUM 500 PO) Take by mouth daily       cholecalciferol (VITAMIN D3) 1,000 units tablet Take 1,000 Units by mouth daily      Cranberry-Vitamin C 250-60 MG CAPS Take 1 tablet by mouth in the morning 90 capsule 3    Cyanocobalamin (VITAMIN B12 PO) Take by mouth daily       dronabinol (MARINOL) 2 5 mg capsule Take 1 capsule (2 5 mg total) by mouth 2 (two) times a day before meals 30 capsule 0    enoxaparin (LOVENOX) 80 mg/0 8 mL Inject 0 8 mL (80 mg total) under the skin every 24 hours 72 mL 1    estradiol (ESTRACE VAGINAL) 0 1 mg/g vaginal cream Insert 1g nightly for 6 weeks, then 1-2x weekly  73 9 g 1    folic acid (FOLVITE) 1 mg tablet Take 1 tablet (1 mg total) by mouth daily  0    gemfibrozil (LOPID) 600 mg tablet TAKE 1 TABLET BY MOUTH EVERY DAY 90 tablet 0    Multiple Vitamin (MULTIVITAMIN) tablet Take 1 tablet by mouth daily      naloxone (NARCAN) 4 mg/0 1 mL nasal spray Administer 1 spray into a nostril  If no response after 2-3 minutes, give another dose in the other nostril using a new spray   1 each 0    omeprazole (PriLOSEC) 20 mg delayed release capsule Take 20 mg by mouth daily      ondansetron (ZOFRAN) 8 mg tablet Take 1 tablet (8 mg total) by mouth every 8 (eight) hours as needed for nausea or vomiting 30 tablet 1    oxybutynin (DITROPAN XL) 15 MG 24 hr tablet TAKE 1 TABLET BY MOUTH DAILY AT BEDTIME 90 tablet 3    rucaparib (RUBRACA) 300 mg tablet Take 600 mg by mouth every 12 (twelve) hours Take with or without food  Do not repeat a vomited dose   saccharomyces boulardii (FLORASTOR) 250 mg capsule Take 1 capsule (250 mg total) by mouth 2 (two) times a day  0    senna (SENOKOT) 8 6 MG tablet Take 1 tablet (8 6 mg total) by mouth 2 (two) times a day as needed for constipation 60 tablet 0    venlafaxine (EFFEXOR) 75 mg tablet Take 75 mg by mouth 3 (three) times a day        clotrimazole-betamethasone (LOTRISONE) 1-0 05 % cream Apply topically 2 (two) times a day (Patient not taking: No sig reported) 30 g 0    LORazepam (ATIVAN) 0 5 mg tablet Take 1 tablet (0 5 mg total) by mouth every 6 (six) hours as needed for anxiety (or nausea) (Patient not taking: No sig reported) 36 tablet 1     No current facility-administered medications for this visit  Allergies  Allergies   Allergen Reactions    Cephalosporins Rash     Which Cephalosporin reaction was to not specified; however, has tolerated Amoxicillin, Cefazolin, and Cefepime       Past Medical History, Social History, Family History, medications and allergies were reviewed  Vitals  Vitals:    09/07/22 1407   BP: 130/82   Pulse: 72   SpO2: 97%   Weight: 57 6 kg (127 lb)   Height: 4' 11" (1 499 m)       Physical Exam  Physical Exam  On examination she is in no acute distress  Her abdomen is soft nontender nondistended  A right nephroureteral stent is in place  The urine in the bag has mild hematuria  The insertion site is clean dry and intact  Gait normal   Affect normal   Left arm in a sling        Results  No results found for: PSA  Lab Results Component Value Date    GLUCOSE 219 (H) 12/19/2017    CALCIUM 9 2 08/19/2022     10/27/2017    K 3 8 08/19/2022    CO2 23 08/19/2022     08/19/2022    BUN 26 (H) 08/19/2022    CREATININE 0 91 08/19/2022     Lab Results   Component Value Date    WBC 6 65 08/19/2022    HGB 10 5 (L) 08/19/2022    HCT 33 8 (L) 08/19/2022     (H) 08/19/2022     08/19/2022         Office Urine Dip  No results found for this or any previous visit (from the past 1 hour(s)) ]

## 2022-09-08 ENCOUNTER — HOSPITAL ENCOUNTER (OUTPATIENT)
Dept: INFUSION CENTER | Facility: CLINIC | Age: 67
Discharge: HOME | End: 2022-09-08
Payer: COMMERCIAL

## 2022-09-08 ENCOUNTER — DOCUMENTATION (OUTPATIENT)
Dept: OTHER | Facility: HOSPITAL | Age: 67
End: 2022-09-08

## 2022-09-08 ENCOUNTER — APPOINTMENT (OUTPATIENT)
Dept: LAB | Facility: AMBULARY SURGERY CENTER | Age: 67
End: 2022-09-08
Attending: OBSTETRICS & GYNECOLOGY
Payer: COMMERCIAL

## 2022-09-08 DIAGNOSIS — C54.1 ENDOMETRIAL CANCER (HCC): Primary | ICD-10-CM

## 2022-09-08 LAB
ALBUMIN SERPL BCP-MCNC: 4.1 G/DL (ref 3.5–5)
ALP SERPL-CCNC: 125 U/L (ref 34–104)
ALT SERPL W P-5'-P-CCNC: 10 U/L (ref 7–52)
AMYLASE SERPL-CCNC: 27 IU/L (ref 29–103)
ANION GAP SERPL CALCULATED.3IONS-SCNC: 12 MMOL/L (ref 4–13)
APTT PPP: 45 SECONDS (ref 23–37)
AST SERPL W P-5'-P-CCNC: 12 U/L (ref 13–39)
BACTERIA UR QL AUTO: ABNORMAL /HPF
BASOPHILS # BLD AUTO: 0.05 THOUSANDS/ΜL (ref 0–0.1)
BASOPHILS NFR BLD AUTO: 1 % (ref 0–1)
BILIRUB SERPL-MCNC: 0.25 MG/DL (ref 0.2–1)
BILIRUB UR QL STRIP: NEGATIVE
BUN SERPL-MCNC: 27 MG/DL (ref 5–25)
CALCIUM SERPL-MCNC: 9.1 MG/DL (ref 8.4–10.2)
CHLORIDE SERPL-SCNC: 105 MMOL/L (ref 96–108)
CHOLEST SERPL-MCNC: 162 MG/DL
CLARITY UR: ABNORMAL
CO2 SERPL-SCNC: 19 MMOL/L (ref 21–32)
COLOR UR: ABNORMAL
CREAT SERPL-MCNC: 1.02 MG/DL (ref 0.6–1.3)
CREAT UR-MCNC: 28.3 MG/DL
EOSINOPHIL # BLD AUTO: 0.06 THOUSAND/ΜL (ref 0–0.61)
EOSINOPHIL NFR BLD AUTO: 1 % (ref 0–6)
ERYTHROCYTE [DISTWIDTH] IN BLOOD BY AUTOMATED COUNT: 13 % (ref 11.6–15.1)
GFR SERPL CREATININE-BSD FRML MDRD: 57 ML/MIN/1.73SQ M
GGT SERPL-CCNC: 8 U/L (ref 5–85)
GLUCOSE SERPL-MCNC: 126 MG/DL (ref 65–140)
GLUCOSE UR STRIP-MCNC: NEGATIVE MG/DL
HCT VFR BLD AUTO: 36.1 % (ref 34.8–46.1)
HGB BLD-MCNC: 11.1 G/DL (ref 11.5–15.4)
HGB UR QL STRIP.AUTO: ABNORMAL
IMM GRANULOCYTES # BLD AUTO: 0.07 THOUSAND/UL (ref 0–0.2)
IMM GRANULOCYTES NFR BLD AUTO: 1 % (ref 0–2)
INR PPP: 1.1 (ref 0.84–1.19)
KETONES UR STRIP-MCNC: NEGATIVE MG/DL
LEUKOCYTE ESTERASE UR QL STRIP: ABNORMAL
LIPASE SERPL-CCNC: 45 U/L (ref 11–82)
LYMPHOCYTES # BLD AUTO: 0.69 THOUSANDS/ΜL (ref 0.6–4.47)
LYMPHOCYTES NFR BLD AUTO: 12 % (ref 14–44)
MAGNESIUM SERPL-MCNC: 1.8 MG/DL (ref 1.9–2.7)
MCH RBC QN AUTO: 30.8 PG (ref 26.8–34.3)
MCHC RBC AUTO-ENTMCNC: 30.7 G/DL (ref 31.4–37.4)
MCV RBC AUTO: 100 FL (ref 82–98)
MONOCYTES # BLD AUTO: 0.63 THOUSAND/ΜL (ref 0.17–1.22)
MONOCYTES NFR BLD AUTO: 11 % (ref 4–12)
NEUTROPHILS # BLD AUTO: 4.28 THOUSANDS/ΜL (ref 1.85–7.62)
NEUTS SEG NFR BLD AUTO: 74 % (ref 43–75)
NITRITE UR QL STRIP: NEGATIVE
NON-SQ EPI CELLS URNS QL MICRO: ABNORMAL /HPF
NRBC BLD AUTO-RTO: 0 /100 WBCS
PH UR STRIP.AUTO: 5.5 [PH]
PHOSPHATE SERPL-MCNC: 4 MG/DL (ref 2.3–4.1)
PLATELET # BLD AUTO: 260 THOUSANDS/UL (ref 149–390)
PMV BLD AUTO: 10.3 FL (ref 8.9–12.7)
POTASSIUM SERPL-SCNC: 3.8 MMOL/L (ref 3.5–5.3)
PROT SERPL-MCNC: 7.4 G/DL (ref 6.4–8.4)
PROT UR STRIP-MCNC: ABNORMAL MG/DL
PROT UR-MCNC: 96 MG/DL
PROT/CREAT UR: 3.39 MG/G{CREAT} (ref 0–0.1)
PROTHROMBIN TIME: 14.4 SECONDS (ref 11.6–14.5)
RBC # BLD AUTO: 3.6 MILLION/UL (ref 3.81–5.12)
RBC #/AREA URNS AUTO: ABNORMAL /HPF
RENAL EPI CELLS #/AREA URNS HPF: PRESENT /[HPF]
SODIUM SERPL-SCNC: 136 MMOL/L (ref 135–147)
SP GR UR STRIP.AUTO: 1.01 (ref 1–1.03)
T3 SERPL-MCNC: 1.2 NG/ML (ref 0.6–1.8)
T4 FREE SERPL-MCNC: 1.05 NG/DL (ref 0.76–1.46)
TSH SERPL DL<=0.05 MIU/L-ACNC: 1.96 UIU/ML (ref 0.45–4.5)
UROBILINOGEN UR STRIP-ACNC: <2 MG/DL
WBC # BLD AUTO: 5.78 THOUSAND/UL (ref 4.31–10.16)
WBC #/AREA URNS AUTO: ABNORMAL /HPF
WBC CLUMPS # UR AUTO: PRESENT /UL

## 2022-09-08 PROCEDURE — 83735 ASSAY OF MAGNESIUM: CPT

## 2022-09-08 PROCEDURE — 82150 ASSAY OF AMYLASE: CPT

## 2022-09-08 PROCEDURE — 84439 ASSAY OF FREE THYROXINE: CPT

## 2022-09-08 PROCEDURE — 84100 ASSAY OF PHOSPHORUS: CPT

## 2022-09-08 PROCEDURE — 83690 ASSAY OF LIPASE: CPT

## 2022-09-08 PROCEDURE — 81001 URINALYSIS AUTO W/SCOPE: CPT

## 2022-09-08 PROCEDURE — 85610 PROTHROMBIN TIME: CPT

## 2022-09-08 PROCEDURE — 84480 ASSAY TRIIODOTHYRONINE (T3): CPT

## 2022-09-08 PROCEDURE — 84156 ASSAY OF PROTEIN URINE: CPT

## 2022-09-08 PROCEDURE — 82977 ASSAY OF GGT: CPT

## 2022-09-08 PROCEDURE — 80053 COMPREHEN METABOLIC PANEL: CPT

## 2022-09-08 PROCEDURE — 85025 COMPLETE CBC W/AUTO DIFF WBC: CPT

## 2022-09-08 PROCEDURE — 82570 ASSAY OF URINE CREATININE: CPT

## 2022-09-08 PROCEDURE — 82465 ASSAY BLD/SERUM CHOLESTEROL: CPT

## 2022-09-08 PROCEDURE — 36415 COLL VENOUS BLD VENIPUNCTURE: CPT

## 2022-09-08 PROCEDURE — 84443 ASSAY THYROID STIM HORMONE: CPT

## 2022-09-08 PROCEDURE — 85730 THROMBOPLASTIN TIME PARTIAL: CPT

## 2022-09-08 NOTE — PROGRESS NOTES
Patient is due for Endobarr Cycle 31 day 1 on 09/09/2022  Patient was seen for Labs today at infusion center   Patient was unable to provide urine sample at this appointment but has supplies and states she will drop off specimen later today  Discussed with ANTOINETTE Dunlap and made aware  Also Spoke with Candy at [facility] and made her aware, Also asked for [facility] center to call Clinical Trials Office at [phone] prior to medication administration to ensure complete lab panel reviewed and approved for treatment by provider

## 2022-09-08 NOTE — PROGRESS NOTES
Patient here for lab work  Port flushed, blood return noted, labs drawn per order  Patient unable to provide urine sample, urine not collected, patient aware to have this completed prior to treatment appointment tomorrow, Reynaldo Tuttle in clinical trials aware  Patient declined AVS  Next appointment reviewed with patient

## 2022-09-09 ENCOUNTER — HOSPITAL ENCOUNTER (OUTPATIENT)
Dept: INFUSION CENTER | Facility: HOSPITAL | Age: 67
End: 2022-09-09
Payer: COMMERCIAL

## 2022-09-09 ENCOUNTER — HOSPITAL ENCOUNTER (OUTPATIENT)
Dept: INFUSION CENTER | Facility: HOSPITAL | Age: 67
End: 2022-09-09

## 2022-09-09 ENCOUNTER — DOCUMENTATION (OUTPATIENT)
Dept: OTHER | Facility: HOSPITAL | Age: 67
End: 2022-09-09

## 2022-09-09 ENCOUNTER — OFFICE VISIT (OUTPATIENT)
Dept: GYNECOLOGIC ONCOLOGY | Facility: CLINIC | Age: 67
End: 2022-09-09

## 2022-09-09 VITALS
TEMPERATURE: 97 F | RESPIRATION RATE: 18 BRPM | HEIGHT: 59 IN | HEART RATE: 62 BPM | WEIGHT: 126.54 LBS | DIASTOLIC BLOOD PRESSURE: 75 MMHG | BODY MASS INDEX: 25.51 KG/M2 | SYSTOLIC BLOOD PRESSURE: 117 MMHG

## 2022-09-09 VITALS
TEMPERATURE: 97 F | DIASTOLIC BLOOD PRESSURE: 70 MMHG | BODY MASS INDEX: 25.2 KG/M2 | RESPIRATION RATE: 16 BRPM | OXYGEN SATURATION: 97 % | HEART RATE: 67 BPM | SYSTOLIC BLOOD PRESSURE: 114 MMHG | WEIGHT: 125 LBS | HEIGHT: 59 IN

## 2022-09-09 DIAGNOSIS — C54.1 ENDOMETRIAL CANCER (HCC): Primary | Chronic | ICD-10-CM

## 2022-09-09 DIAGNOSIS — E78.5 DYSLIPIDEMIA: ICD-10-CM

## 2022-09-09 PROCEDURE — 96413 CHEMO IV INFUSION 1 HR: CPT

## 2022-09-09 PROCEDURE — J9035Q0 INV BEVACIZUMAB 400MG/16ML 16 ML: Performed by: PHYSICIAN ASSISTANT

## 2022-09-09 PROCEDURE — J9999Q0 INV ATEZOLIZUMAB 1200MG/20ML 20 ML: Performed by: PHYSICIAN ASSISTANT

## 2022-09-09 PROCEDURE — 96417 CHEMO IV INFUS EACH ADDL SEQ: CPT

## 2022-09-09 RX ORDER — SODIUM CHLORIDE 9 MG/ML
20 INJECTION, SOLUTION INTRAVENOUS ONCE
Status: COMPLETED | OUTPATIENT
Start: 2022-09-09 | End: 2022-09-09

## 2022-09-09 RX ADMIN — Medication 1200 MG: at 10:34

## 2022-09-09 RX ADMIN — Medication 858 MG: at 11:15

## 2022-09-09 RX ADMIN — SODIUM CHLORIDE 20 ML/HR: 0.9 INJECTION, SOLUTION INTRAVENOUS at 10:17

## 2022-09-09 NOTE — PROGRESS NOTES
Assessment/Plan:    Problem List Items Addressed This Visit        Genitourinary    Endometrial cancer ([facility] [address] ) - Primary (Chronic)     Recurrent stage IB endometrial cancer currently on ENDOBARR clinical trial presents  She has received 30 cycles  Rucaparib was held with cycle 28 d/t treatment-related fatigue and anemia  Her fatigue and anemia has improved  Imaging on 8/16/22 noted stable disease  Avastin held with cycle 30 due to [facility] ratio elevation      Labs from 9/8/22 reviewed  UPC ratio is down-trending, and meets protocol parameter  Proceed with next cycle of treatment as scheduled       Return to the office per protocol                   CHIEF COMPLAINT:   F/U on clinical trial, pre cycle 31    Problem:  Cancer Staging  Endometrial cancer [facility])  Staging form: Corpus Uteri - Carcinoma, AJCC 7th Edition  - Clinical stage from 12/19/2017: FIGO Stage IB (T1b, N0, M0) - Signed by Jeanella Peabody, MD on 2/12/2018        Previous therapy:  Oncology History   Endometrial cancer ([facility] [address] )   11/17/2017 Initial Diagnosis    Endometrial cancer ([facility] [address] )     11/17/2017 Biopsy    ENDOMETRIAL BIOPSY: WELL DIFFERENTIATED ENDOMETRIAL ADENOCARCINOMA (FIGO I) WITH FOCALMUCINOUS FEATURES  Part B: ENDOCERVICAL POLYP:BENIGN ENDOCERVICAL      12/19/2017 Surgery    Robotic assisted total laparoscopic hysterectomy with bilateral salpingo-oophorectomy and sentinel bilateral pelvic lymph node dissection  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 (4 4 x 3 2 cm tumor, 9 4/15 4mm invasion, NO LVSI, washings revealed atypical cellular changes)     12/19/2017 Genetic Testing    Morrison testing negative     6/28/2019 Biopsy    A  Breast, Right, US BX Right Breast 1000 4 cmfn:  - Benign breast tissue with focal histiocytic aggregate  See comment   - Negative for atypia and in-situ or invasive carcinoma       7/8/2019 Recurrence    Presented with right lower extremity DVT and CT demonstrating right pelvic sidewall mass with venous, ureteral and nerve compression causing significant neuropathic pain  Biopsy:  Lymph Node, Right pelvic lymph node x3:  - High-grade adenocarcinoma  7/30/2019 - 1/6/2020 Chemotherapy    Taxol 175 mg/m2 and carboplatin AUC 6 every 21 days  Dose was reduced to taxol 135 mg/m2 and carboplatin AUC 5  Completed 6 cycles  Treatment protracted due to multiple hospital admissions  2/24/2020 - 4/13/2020 Radiation    adjuvant external beam radiation therapy to the whole pelvis to 4500 cGy followed by boost to gross disease of an additional 2200 cGy     11/23/2020 Progression    New necrotic adenopathy in the retroperitoneum on CT      12/21/2020 -  Chemotherapy    Began chemotherapy with rucaparib, atezolizumab, and bevacizumab as per [facility] clinical trial            Patient ID: Remedios Amato is a 74 y o  female  who presents to the office for follow-up on clinical trial  Overall, the patient continues to tolerate treatment well  She has been afebrile  She denies n/v/abdominal pain  Appetite is appropriate  Normal bowel/bladder function  She denies diarrhea, blood in her stool, new cough/SOB  She is without skin rashes  Her blood pressures are well controlled  She has an upcoming stent exchange with capping trial  Trial labs from 9/8/22 reviewed  The following portions of the patient's history were reviewed and updated as appropriate: allergies, current medications, past medical history, past surgical history and problem list     Review of Systems   Constitutional: Positive for fatigue (stable)  Negative for appetite change and fever  HENT: Negative  Eyes: Negative  Respiratory: Negative  Cardiovascular: Negative  Gastrointestinal: Negative for abdominal pain, blood in stool and diarrhea  Genitourinary:        As per HPI  Musculoskeletal: Negative  Skin: Negative  Neurological: Negative  Psychiatric/Behavioral: Negative          Current Outpatient Medications   Medication Sig Dispense Refill    ARIPiprazole (ABILIFY) 20 MG tablet Take 20 mg by mouth in the morning   aspirin (ECOTRIN LOW STRENGTH) 81 mg EC tablet Take 81 mg by mouth daily      Calcium-Magnesium-Vitamin D (CALCIUM 500 PO) Take by mouth daily       cholecalciferol (VITAMIN D3) 1,000 units tablet Take 1,000 Units by mouth daily      Cranberry-Vitamin C 250-60 MG CAPS Take 1 tablet by mouth in the morning 90 capsule 3    Cyanocobalamin (VITAMIN B12 PO) Take by mouth daily       dronabinol (MARINOL) 2 5 mg capsule Take 1 capsule (2 5 mg total) by mouth 2 (two) times a day before meals 30 capsule 0    enoxaparin (LOVENOX) 80 mg/0 8 mL Inject 0 8 mL (80 mg total) under the skin every 24 hours 72 mL 1    estradiol (ESTRACE VAGINAL) 0 1 mg/g vaginal cream Insert 1g nightly for 6 weeks, then 1-2x weekly  10 2 g 1    folic acid (FOLVITE) 1 mg tablet Take 1 tablet (1 mg total) by mouth daily  0    gemfibrozil (LOPID) 600 mg tablet TAKE 1 TABLET BY MOUTH EVERY DAY 90 tablet 0    Multiple Vitamin (MULTIVITAMIN) tablet Take 1 tablet by mouth daily      naloxone (NARCAN) 4 mg/0 1 mL nasal spray Administer 1 spray into a nostril  If no response after 2-3 minutes, give another dose in the other nostril using a new spray  1 each 0    omeprazole (PriLOSEC) 20 mg delayed release capsule Take 20 mg by mouth daily      ondansetron (ZOFRAN) 8 mg tablet Take 1 tablet (8 mg total) by mouth every 8 (eight) hours as needed for nausea or vomiting 30 tablet 1    oxybutynin (DITROPAN XL) 15 MG 24 hr tablet TAKE 1 TABLET BY MOUTH DAILY AT BEDTIME 90 tablet 3    rucaparib (RUBRACA) 300 mg tablet Take 600 mg by mouth every 12 (twelve) hours Take with or without food  Do not repeat a vomited dose        saccharomyces boulardii (FLORASTOR) 250 mg capsule Take 1 capsule (250 mg total) by mouth 2 (two) times a day  0    senna (SENOKOT) 8 6 MG tablet Take 1 tablet (8 6 mg total) by mouth 2 (two) times a day as needed for constipation 60 tablet 0    venlafaxine (EFFEXOR) 75 mg tablet Take 75 mg by mouth 3 (three) times a day        clotrimazole-betamethasone (LOTRISONE) 1-0 05 % cream Apply topically 2 (two) times a day (Patient not taking: No sig reported) 30 g 0    LORazepam (ATIVAN) 0 5 mg tablet Take 1 tablet (0 5 mg total) by mouth every 6 (six) hours as needed for anxiety (or nausea) (Patient not taking: No sig reported) 36 tablet 1     No current facility-administered medications for this visit  Facility-Administered Medications Ordered in Other Visits   Medication Dose Route Frequency Provider Last Rate Last Admin    INV atezolizumab (INVESTIGATIONAL) 1,200 mg in sodium chloride 0 9 % 250 mL infusion  1,200 mg Intravenous Once Sheyla Busby PA-C        INV bevacizumab (INVESTIGATIONAL) 858 mg in sodium chloride 0 9 % 65 68 mL IVPB  15 mg/kg (Order-Specific) Intravenous Once [employer], PA-C        sodium chloride 0 9 % infusion  20 mL/hr Intravenous Once [employer], PA-C               Objective:    Blood pressure 114/70, pulse 67, temperature (!) 97 °F (36 1 °C), temperature source Temporal, resp  rate 16, height 4' 11" (1 499 m), weight 56 7 kg (125 lb), SpO2 97 %, not currently breastfeeding  Body mass index is 25 25 kg/m²  Body surface area is 1 51 meters squared  Physical Exam  Vitals reviewed  Constitutional:       General: She is not in acute distress  Appearance: Normal appearance  She is not ill-appearing  HENT:      Head: Normocephalic and atraumatic  Mouth/Throat:      Mouth: Mucous membranes are moist    Eyes:      General: No scleral icterus  Right eye: No discharge  Left eye: No discharge  Conjunctiva/sclera: Conjunctivae normal    Pulmonary:      Effort: Pulmonary effort is normal    Musculoskeletal:      Right lower leg: No edema  Left lower leg: No edema  Skin:     General: Skin is warm and dry  Coloration: Skin is not jaundiced  Findings: No rash  Neurological:      General: No focal deficit present  Mental Status: She is alert and oriented to person, place, and time  Cranial Nerves: No cranial nerve deficit  Sensory: No sensory deficit  Motor: No weakness  Gait: Gait normal    Psychiatric:         Mood and Affect: Mood normal          Behavior: Behavior normal          Thought Content: Thought content normal          Judgment: Judgment normal      Performance status is one         Lab Results   Component Value Date     10/27/2017    K 3 8 09/08/2022     09/08/2022    CO2 19 (L) 09/08/2022    BUN 27 (H) 09/08/2022    CREATININE 1 02 09/08/2022    GLUCOSE 219 (H) 12/19/2017    GLUF 89 08/19/2022    CALCIUM 9 1 09/08/2022    CORRECTEDCA 9 4 07/29/2022    AST 12 (L) 09/08/2022    ALT 10 09/08/2022    ALKPHOS 125 (H) 09/08/2022    PROT 6 9 10/27/2017    BILITOT 0 3 10/27/2017    EGFR 57 09/08/2022     Lab Results   Component Value Date    WBC 5 78 09/08/2022    HGB 11 1 (L) 09/08/2022    HCT 36 1 09/08/2022     (H) 09/08/2022     09/08/2022     Lab Results   Component Value Date    NEUTROABS 4 28 09/08/2022

## 2022-09-09 NOTE — PROGRESS NOTES
Ragini Melendez (1955) is patient #[id number] on the [facility] clinical trial  Pt was seen for pre-treatment office visit and physical exam With Chetan Palmer PA-C  09-Sept-2022  AEs and Con Meds were reviewed with pt  She continues to experience mild fatigue and her Hgb has increased to 11 1, her UPC ratio has decreased to 3 39 and is ok to resume Avastin per protocol  Labs were signed and reviewed with Chetan Palmer PA-C, per protocol pt is ok to proceed with treatment cycle 31 day 1 scheduled for 09/09/2022  Patientwas also seen at infusion center and was given cycle 31 rucaparib pills and drug diary  Overall, she states that she is feeling well   Patients schedule was reviewed  Pt will call with any questions or concerns

## 2022-09-09 NOTE — ASSESSMENT & PLAN NOTE
Recurrent stage IB endometrial cancer currently on ENDOBARR clinical trial presents  She has received 30 cycles  Rucaparib was held with cycle 28 d/t treatment-related fatigue and anemia  Her fatigue and anemia has improved  Imaging on 8/16/22 noted stable disease  Avastin held with cycle 30 due to [facility] ratio elevation      Labs from 9/8/22 reviewed  UPC ratio is down-trending, and meets protocol parameter   Proceed with next cycle of treatment as scheduled       Return to the office per protocol   eICU® Admission Review Note    Reason for ICU Admission: s/p Anterior mandible reconstruction with free fibula flap and tracheostomy    Unplanned Post-Op ICU Admission: No     Code Status: Full Resuscitation      Data Reviewed:   Recent Notes:yes   Laboratory Results:yes   Radiology Images/Reports:Yes                AV Assessment:Yes    EBBP Review:       SUP:   not indicated   VTEP: Compression Devices     Blood Glucose Monitoring:    DM:Not previously diagnosed     Blood Glucose:  Glucose (mg/dL)   Date Value   01/15/2018 172 (H)      Treatment Ordered:N/A     Ventilator Review:   Intubated: trach   ARDS:     no, tidal volume review completed.                                        Chlorhexidine oral care:Yes     IBW/kg (Calculated) FEMALE: 82.3 (01/15/18 1835)   IBW/kg (Calculated) Male: 86.8 (01/15/18 1835)      Active Problems: s/p Anterior mandible reconstruction with free fibula flap and trachestomy      Srikanth Hernandez MD  2:03 AM

## 2022-09-11 NOTE — ASSESSMENT & PLAN NOTE
· POA, baseline appears 0 7-0 9 however was 1 12 on 12/18/2019  · Possibly in setting of worsening hydronephrosis and sepsis  · Cr improved from 1 63-> 1 44 -> 1 25 -> 1 08  · Okay to discontinue fluids  · Management of hydronephrosis as above [Normal] : supple, no neck mass and thyroid not enlarged [Normal Neck Lymph Nodes] : normal neck lymph nodes  [Normal Supraclavicular Lymph Nodes] : normal supraclavicular lymph nodes [Normal Axillary Lymph Nodes] : normal axillary lymph nodes [Normal] : oriented to person, place and time, with appropriate affect [de-identified] : soft NT ND, well healed incision sites without evidence of infection [de-identified] : healing incision sites w/o evidence of infection

## 2022-09-12 ENCOUNTER — TELEPHONE (OUTPATIENT)
Dept: RADIOLOGY | Facility: HOSPITAL | Age: 67
End: 2022-09-12

## 2022-09-12 RX ORDER — LEVOFLOXACIN 5 MG/ML
500 INJECTION, SOLUTION INTRAVENOUS ONCE
Status: CANCELLED | OUTPATIENT
Start: 2022-09-12 | End: 2022-09-12

## 2022-09-12 RX ORDER — SODIUM CHLORIDE 9 MG/ML
75 INJECTION, SOLUTION INTRAVENOUS CONTINUOUS
Status: CANCELLED | OUTPATIENT
Start: 2022-09-12

## 2022-09-12 RX ORDER — GEMFIBROZIL 600 MG/1
TABLET, FILM COATED ORAL
Qty: 90 TABLET | Refills: 0 | Status: SHIPPED | OUTPATIENT
Start: 2022-09-12

## 2022-09-12 NOTE — PRE-PROCEDURE INSTRUCTIONS
Pre-procedure Instructions for Interventional Radiology  [address] [place] [address] [address] [phone]    You are scheduled for a/an Right PCNU Exchange  On Monday 9/19/22  Your tentative arrival time is AM   Short stay will notify you the day before your procedure with the exact arrival time and the location to arrive  To prepare for your procedure:  1  Please arrange for someone to drive you home after the procedure and stay with you until the next morning if you are instructed to do so  This is typically for patients receiving some type of sedative or anesthetic for the procedure  2  DO NOT EAT OR DRINK ANYTHING after midnight on the evening before your procedure including candy & gum   3  ONLY SIPS OF WATER with your medications are allowed on the morning of your procedure  4  TAKE ALL OF YOUR REGULAR MEDICATIONS THE MORNING OF YOUR PROCEDURE with sips of water! We may call you to stop some of your blood sugar, blood pressure and blood thinning medications depending on the procedure  Please take all of these medications unless we instruct you to stop them  5  If you have an allergy to x-ray dye, please contact Interventional Radiology for an x-ray dye preparation which usually consists of an oral steroid and Benadryl  The day of your procedure:  1  Bring a list of the medications you take at home  2  Bring medications you take for breathing problems (such as inhalers), medications for chest pain, or both  3  Bring a case for your glasses or contacts  4  Bring your insurance card and a form of photo ID   5  Please leave all valuables such as credit cards and jewelry at home  6  Report to the registration desk in the main lobby at the [facility]  Ask to be directed to [facility]  7  While your procedure is being performed, your family may wait in the Radiology Waiting Room on the 1st floor in Radiology    if they need to leave, they may provide a number to be called following the procedure  8  Be prepared to stay overnight just in case  Sometimes procedures will indicate the need for further observation or treatment  9  If you are scheduled for a follow-up visit with the Interventional Radiologist after your procedure, you will be called with a date and time  10  Covid vaccine and booster completed      Special Instructions (Medications to stop taking before your procedure etc ):

## 2022-09-14 NOTE — PROGRESS NOTES
Patient tolerated treatment today without issues   Patient verified upcoming appointment and declined AVS 
Pt to clinic for clinical trial medications  Pt offers no complaints at this time  Will continue to monitor 
[id number]

## 2022-09-19 ENCOUNTER — HOSPITAL ENCOUNTER (OUTPATIENT)
Dept: RADIOLOGY | Facility: HOSPITAL | Age: 67
Discharge: HOME | End: 2022-09-19
Attending: RADIOLOGY
Payer: COMMERCIAL

## 2022-09-19 VITALS
BODY MASS INDEX: 25.6 KG/M2 | RESPIRATION RATE: 16 BRPM | DIASTOLIC BLOOD PRESSURE: 67 MMHG | WEIGHT: 127 LBS | HEART RATE: 98 BPM | HEIGHT: 59 IN | SYSTOLIC BLOOD PRESSURE: 123 MMHG | TEMPERATURE: 98 F | OXYGEN SATURATION: 99 %

## 2022-09-19 DIAGNOSIS — N13.5 OBSTRUCTION OF RIGHT URETER: Primary | ICD-10-CM

## 2022-09-19 DIAGNOSIS — N13.5 OBSTRUCTION OF RIGHT URETER: ICD-10-CM

## 2022-09-19 PROCEDURE — C1769 GUIDE WIRE: HCPCS

## 2022-09-19 PROCEDURE — 99152 MOD SED SAME PHYS/QHP 5/>YRS: CPT

## 2022-09-19 PROCEDURE — 50387 CHANGE NEPHROURETERAL CATH: CPT

## 2022-09-19 PROCEDURE — 99153 MOD SED SAME PHYS/QHP EA: CPT

## 2022-09-19 PROCEDURE — 99152 MOD SED SAME PHYS/QHP 5/>YRS: CPT | Performed by: RADIOLOGY

## 2022-09-19 PROCEDURE — C2617 STENT, NON-COR, TEM W/O DEL: HCPCS

## 2022-09-19 PROCEDURE — 50387 CHANGE NEPHROURETERAL CATH: CPT | Performed by: RADIOLOGY

## 2022-09-19 RX ORDER — SODIUM CHLORIDE 9 MG/ML
75 INJECTION, SOLUTION INTRAVENOUS CONTINUOUS
Status: DISCONTINUED | OUTPATIENT
Start: 2022-09-19 | End: 2022-09-20 | Stop reason: HOSPADM

## 2022-09-19 RX ORDER — MIDAZOLAM HYDROCHLORIDE 2 MG/2ML
INJECTION, SOLUTION INTRAMUSCULAR; INTRAVENOUS CODE/TRAUMA/SEDATION MEDICATION
Status: COMPLETED | OUTPATIENT
Start: 2022-09-19 | End: 2022-09-19

## 2022-09-19 RX ORDER — SODIUM CHLORIDE 9 MG/ML
10 INJECTION INTRAVENOUS DAILY
Qty: 300 ML | Refills: 3 | Status: SHIPPED | OUTPATIENT
Start: 2022-09-19

## 2022-09-19 RX ORDER — FENTANYL CITRATE 50 UG/ML
INJECTION, SOLUTION INTRAMUSCULAR; INTRAVENOUS CODE/TRAUMA/SEDATION MEDICATION
Status: COMPLETED | OUTPATIENT
Start: 2022-09-19 | End: 2022-09-19

## 2022-09-19 RX ORDER — LEVOFLOXACIN 5 MG/ML
500 INJECTION, SOLUTION INTRAVENOUS ONCE
Status: COMPLETED | OUTPATIENT
Start: 2022-09-19 | End: 2022-09-19

## 2022-09-19 RX ADMIN — FENTANYL CITRATE 50 MCG: 50 INJECTION INTRAMUSCULAR; INTRAVENOUS at 11:36

## 2022-09-19 RX ADMIN — IOHEXOL 15 ML: 350 INJECTION, SOLUTION INTRAVENOUS at 12:01

## 2022-09-19 RX ADMIN — SODIUM CHLORIDE 75 ML/HR: 0.9 INJECTION, SOLUTION INTRAVENOUS at 10:12

## 2022-09-19 RX ADMIN — MIDAZOLAM 1 MG: 1 INJECTION INTRAMUSCULAR; INTRAVENOUS at 11:36

## 2022-09-19 RX ADMIN — LEVOFLOXACIN 500 MG: 500 INJECTION, SOLUTION INTRAVENOUS at 11:15

## 2022-09-19 NOTE — DISCHARGE INSTRUCTIONS
Nephrostomy Tube Care     WHAT YOU NEED TO KNOW:   A nephrostomy tube is a catheter (thin plastic tube) that is inserted through your skin and into your kidney  The nephrostomy tube drains urine from your kidney into a collecting bag outside your body  You may need a nephrostomy tube when something is blocking the normal flow of urine  A nephrostomy tube may be used for a short or a long period of time  The nephrostomy tube comes out of your back, so you will need someone to help care for your nephrostomy tube  DISCHARGE INSTRUCTIONS:      How to clean the skin around the nephrostomy tube and change the bandage:  Since the nephrostomy tube comes out of your back, you will not be able to care for it by yourself  Ask someone to follow the general directions below to check and care for your nephrostomy tube  Gather the items you will need  Disposable (single use) under-pad, and a clean washcloth  Plain soap, warm water, and new medical gloves  Sterile gauze bandages  Clear adhesive dressing or medical tape  Skin barrier  Protective skin film  Trash bag  Remove the old bandage, and check the tube entry site  Have the patient lie on his side with the nephrostomy tube entry site facing up  Place the under-pad where it will catch drainage as you are working with the nephrostomy tube  Wash your hands with soap and water  Put on new medical gloves  Gently remove the old bandage, without pulling on the tube  Do this by holding the skin beside the tube with one hand  With the other hand, gently remove sticky tape and the skin barrier by pulling in the same direction as hair growth  Do not touch the side of the bandage that is placed over or around the tube  Throw the bandage and skin barrier away in a trash bag  Look for signs of infection, such as skin redness and swelling  Report any skin changes to healthcare providers  Clean the tube entry site      Hold the tube in place to keep it from being pulled out while you are cleaning around it  You will need to clean the area twice  For the first cleaning, wet a new gauze bandage with soap and water  Begin at the entry site of the tube  Wipe the skin in circles, moving away from the entry site  Remove blood and any other material with the gauze  Do this as often as needed  Use a new gauze bandage each time you clean the area, moving away from the entry site  For the second cleaning, wet a new gauze bandage with water  Begin at the entry site of the tube  Wipe the skin in circles, moving away from the entry site  Use a new gauze bandage each time you clean the area, moving away from the entry site  Gently pat the skin with a clean washcloth to dry it  Apply the skin barrier and bandages  Roll up a bandage to make it thick, and place it under  the place where the tube enters the skin  Place it to support the tube, and stop it from kinking or bending  Tape the bandage in place, and apply more bandages if directed by a healthcare provider  Bring the tubing forward to the front and tape it to the skin  Do not stretch the tube tight, because this may pull the nephrostomy tube out  How often to change the bandage  Change the bandage around the tube, every other day  If your bandages  get dirty or wet, change them right away, and as often as needed  If your nephrostomy tube is to be used for a long period of time, the tube needs to be changed every 2 to 3 months  Healthcare providers will tell you when you need to make an appointment to have your tube changed  How to care for the urine drainage bag:   Ask if you need to measure and write down how much urine is in the bag before you empty it  Drain urine out of the drainage bag when it is ½ to ? full  Open the spout at the bottom of the bag to empty the urine into the toilet  You may need to detach the drainage bag from the nephrostomy tube to change it    If so, attach a new drainage bag tightly to the nephrostomy tube  How to prevent problems with your nephrostomy tube:   Change bandages, directed  This helps to prevent infection  Throw away or clean your drainage bag as directed by your healthcare provider  Wipe the connecting ends of the drainage bag with alcohol before you reconnect the bag to the tube  This helps prevent infection  Keep the tube taped to your skin and connected to a drainage bag placed below the level of your kidneys  This helps prevent urine from backing up into your kidneys  You may wear a small drainage bag strapped to your leg to let you move around more easily  Check the catheter to be sure it is in place after you change your clothes or do other activities  Do not wear tight clothing over the tube  Place the tubing over your thigh rather than under it when you are sitting down  Be sure that nothing is pulling on the nephrostomy tube when you move around  Change positions if you see little or no urine in your drainage bag  Check to see if the urine tube is twisted or bent  Be sure that you are not sitting or lying on the tube  If there are no kinks and there is little or no urine in the drainage bag, tell your healthcare provider  Flush out the tube as directed  Some tubes get flushed one time a day with 10 mls of NSS You will be given a prescription for the flushes  To flush the nephrostomy tube, clean both connections with alcohol swap  Twist off the drainage bag tube and twist the saline syringe into the nephrostomy tube and flush briskly  Remove the syringe and twist the drainage bag tube back into the nephrostomy tube  Keep the site covered while you shower  Tape a piece of clear adhesive plastic over the dressing to keep it dry while you shower  Do not take tub baths      Contact Interventional Radiology at [phone] [facility] PATIENTS: Contact Interventional Radiology at [phone]) [facility] PATIENTS: Contact Interventional Radiology at [phone]) if:  The skin around the nephrostomy tube is red, swollen, itches, or has a rash  You have a fever greater than 101 or chills  You have lower back or hip pain  There are changes in how your urine looks or smells  You have little or no urine draining from the nephrostomy tube  You have nausea and are vomiting  The black bernardo on your tube has moved, or the tube is longer than when it was put in  You have questions or concerns about your condition or care  The nephrostomy tube comes out completely  There is blood, pus, or a bad smell coming from the place where the tube enters your skin  Urine is leaking around the tube  The following pharmacies carry the flush syringes  Home [facility]                     [facility]       [address] [facility]                    [id number] [place] [address]  Phone [phone]            Phone [phone]  [address] & [facility]                      [address]                                 [phone]  Phone [phone]            Phone [phone]    [facility]                                                                         [facility] [phone]  [address]   Phone [phone]   Procedural Sedation   WHAT YOU NEED TO KNOW:   Procedural sedation is medicine used during procedures to help you feel relaxed and calm  You will remember little to none of the procedure  After sedation you may feel tired, weak, or unsteady on your feet  You may also have trouble concentrating or short-term memory loss   These symptoms should go away in 24 hours or less  DISCHARGE INSTRUCTIONS:     Call 911 or have someone else call for any of the following: You have sudden trouble breathing  You cannot be woken  Contact Interventional Radiology at [phone]   [facility] PATIENTS: Contact Interventional Radiology at [phone]) [facility] PATIENTS: Contact Interventional Radiology at [phone]) if any of the following occur: You have a severe headache or dizziness  Your heart is beating faster than usual     You have a fever or chills  Your skin is itchy, swollen, or you have a rash  You have nausea or are vomiting for more than 8 hours after the procedure  You have questions or concerns about your condition or care  Self-care:   Have someone stay with you for 24 hours  This person can drive you to errands and help you do things around the house  This person can also watch for problems  Rest and do quiet activities for 24 hours  Do not exercise, ride a bike, or play sports  Stand up slowly to prevent dizziness and falls  Take short walks around the house with another person  Slowly return to your usual activities the next day  Do not drive or use dangerous machines or tools for 24 hours  You may injure yourself or others  Examples include a lawnmower, saw, or drill  Do not return to work for 24 hours if you use dangerous machines or tools for work  Do not make important decisions for 24 hours  For example, do not sign important papers or invest money  Drink liquids as directed  Liquids help flush the sedation medicine out of your body  Ask how much liquid to drink each day and which liquids are best for you  Eat small, frequent meals to prevent nausea and vomiting  Start with clear liquids such as juice or broth  If you do not vomit after clear liquids, you can eat your usual foods  Do not drink alcohol or take medicines that make you drowsy   This includes medicines that help you sleep and anxiety medicines  Ask your healthcare provider if it is safe for you to take pain medicine  Follow up with your healthcare provider as directed: Write down your questions so you remember to ask them during your visits

## 2022-09-19 NOTE — SEDATION DOCUMENTATION
PCNU exchanged by Dr Yevgeniy Jones without complications  PCNU capped at this time  Sent home with gravity bag if needing to be placed to drainage  Discharged back to Short Stay by this RN via stretcher

## 2022-09-19 NOTE — PROGRESS NOTES
PCNU is exchanged and capped  If patient tolerates capping, when she is due for her next exchange, she can be internalized instead to a double-J stent  Outpatient orders have been placed for her next exchange  This can be changed to an internal double-J if appropriate  Dr Veronika Tomlinson will then exchange her double-J stent cystoscopically

## 2022-09-19 NOTE — BRIEF OP NOTE (RAD/CATH)
INTERVENTIONAL RADIOLOGY PROCEDURE NOTE    Date: 9/19/2022    Procedure: IR NEPHROURETERAL STENT CHECK/CHANGE/REPOSITION    Preoperative diagnosis:   1  Obstruction of right ureter         Postoperative diagnosis: Same  Surgeon: Yeyo Mccallum MD     Assistant: None  No qualified resident was available  Blood loss:  Trace    Specimens:  None     Findings:  Successful routine exchange of right nephroureteral stent  At the end the procedure, the catheter was capped  Complications: None immediate      Anesthesia: conscious sedation

## 2022-09-21 ENCOUNTER — EVALUATION (OUTPATIENT)
Dept: PHYSICAL THERAPY | Facility: CLINIC | Age: 67
End: 2022-09-21
Payer: COMMERCIAL

## 2022-09-21 DIAGNOSIS — S42.295D OTHER CLOSED NONDISPLACED FRACTURE OF PROXIMAL END OF LEFT HUMERUS WITH ROUTINE HEALING, SUBSEQUENT ENCOUNTER: ICD-10-CM

## 2022-09-21 PROCEDURE — 97162 PT EVAL MOD COMPLEX 30 MIN: CPT

## 2022-09-21 PROCEDURE — 97112 NEUROMUSCULAR REEDUCATION: CPT

## 2022-09-21 NOTE — PROGRESS NOTES
PT Evaluation     Today's date: 2022  Patient name: Nanette Rascon  : 1955  MRN: [id number]  Referring provider: Joshua Lambert DO  Dx:   Encounter Diagnosis     ICD-10-CM    1  Other closed nondisplaced fracture of proximal end of left humerus with routine healing, subsequent encounter  S42 554D Ambulatory Referral to Physical Therapy       Start Time: 1400  Stop Time: 1445  Total time in clinic (min): 45 minutes    Assessment  Assessment details: Patient is a 74 y o  female who presents to outpatient PT IE s/p fall on 2022 with Left proximal humeral fracture  Patient presents to IE at this time wearing sling as given per physician  Patient at this time is only permitted to perform gentle ROM and postural exercises at this  She is cleared for strengthening in about 3 weeks (as per last physician appointment on  ) Patient at this time exhibits forward hunched/rounded shoulder posture due to stiffness from immobilization and minimal use of Left UE  Patient's PROM and AROM are thus impacted, with decreased ability to move L UE past 50% of full ROM at this time  Patient is Right-handed and is thus performing most activities with use of Right hand  Patient has difficulty toperform certain activities that require use of both hands or just Left hand (ie: lifting, certain household chores, and self-care activities)  Patient otherwise reports she is independent with ADLs and self-care tasks, however lives with her mother who assists her in certain other tasks and household chores  She is also not driving at this time  Patient would benefit from skilled PT services to improve her Left shoulder mobility, improve postural mechanics, reduce pain levels, and progress strength training when permitted by physician in order to perform ADLs and self-care tasks more efficiently and easily and to overall maximize function    Thank you for the referral     Impairments: abnormal muscle firing, abnormal or restricted ROM, abnormal movement, activity intolerance, impaired physical strength, lacks appropriate home exercise program, pain with function, scapular dyskinesis, poor posture  and poor body mechanics  Functional limitations: reaching overhead/laterally, lifting, lying/sleeping on Left sideBarriers to therapy: Undergoing chemo, nephro stents, extensive medical hx  Understanding of Dx/Px/POC: good   Prognosis: good    Goals  Impairment Goals 4-6 weeks  In order to improve and maximize function patient will be able to     - Decrease pain intensity to <3/10  - Improve shoulder PROM to [facility] without pain throughout to improve flexibility of GHJ  - Improve shoulder AROM to [facility] without pain throughout to improve mobility of shoulder for varying reaching tasks    Functional Goals 6-8 weeks  In order to improve and maximize function patient will be able to     - Return to Prior Level of Function with no greater than 2/10 pain   - Increase Functional Status Measure (FOTO) to > = expected at Discharge  - Be independent and compliant with HEP  - Have the ROM necessary for performing dressing/bathing activities without difficulty  - Sleep throughout the night without disturbances due to pain         Plan  Patient would benefit from: skilled PT  Planned modality interventions: cryotherapy  Other planned modality interventions: moist heat  Planned therapy interventions: joint mobilization, manual therapy, neuromuscular re-education, patient education, strengthening, stretching, therapeutic activities, therapeutic exercise, home exercise program, functional ROM exercises, postural training, body mechanics training, flexibility, Paz taping and massage  Frequency: 1-2x week    Duration in weeks: 4  Treatment plan discussed with: patient        Subjective Evaluation    History of Present Illness  Date of onset: 8/13/2022  Mechanism of injury: trauma  Mechanism of injury: More Carmen is a 74y o  year-old female who presents to outpatient PT s/p Left proximal humeral fracture that occurred on 2022  Patient reports she was home, she was wearing flipflops when her flipflop got stuck to a door  Patient reports she fell forward and onto Left shoulder  Patient reports she was able to get up by herself, and then called her sister  Patient did not want to go to ER, however was convinced by her sister to go on 8/15/2022  Patient received x-ray at the time that revealed proximal humeral fracture (see full report for details)  Patient was given sling and sent home  She had follow up on 2022 and received follow up x-rays again that stated fracture was healing well  Patient continues to be in sling at this time, with precautions for no strengthening for 6 weeks  Patient has follow up MD appointment on 10/4/2022  Patient is independent with most ADLs and self-care tasks, but requires assistance with putting her sling on  Patient is not driving at this time  Quality of life: good    Pain  Current pain ratin  At best pain ratin  At worst pain ratin  Location: L shoulder  Quality: dull ache  Relieving factors: medications and support  Aggravating factors: overhead activity and lifting  Progression: improved    Social Support  Lives with: mother  Hand dominance: right      Diagnostic Tests  X-ray: abnormal (8/15, : L shoulder)  Treatments  Previous treatment: immobilization  Current treatment: physical therapy  Patient Goals  Patient goals for therapy: decreased pain, increased motion, independence with ADLs/IADLs, return to sport/leisure activities and increased strength          Objective     Postural Observations    Additional Postural Observation Details  Left shoulder guarded, rounded/kyphotic thoracic posture    Observations     Additional Observation Details  Patient in Left UE sling    Palpation   Left   Muscle spasm in the anterior deltoid, middle deltoid and triceps     Tenderness of the anterior deltoid, middle deltoid and triceps  Tenderness     Left Shoulder   Tenderness in the [facility] joint, biceps tendon (proximal) and bicipital groove  Active Range of Motion   Left Shoulder   Flexion: 75 degrees   Extension: 60 degrees   Abduction: 40 degrees   External rotation 0°: 30 degrees   External rotation BTH: Active external rotation behind the head: suboccipital    Internal rotation BTB: Active internal rotation behind the back: glut  Right Shoulder   Normal active range of motion    Passive Range of Motion   Left Shoulder   Flexion: 85 degrees with pain  Abduction: 45 degrees with pain  External rotation 0°: 55 degrees with pain    Right Shoulder   Normal passive range of motion    Scapular Mobility   Left Shoulder   Scapular mobility: poor    Additional Scapular Mobility Details  Passive mobility with significant stiffness in distraction, upward and downward rotations  Joint Play   Left Shoulder  Hypomobile in the anterior capsule, posterior capsule and inferior capsule      Strength/Myotome Testing     Left Shoulder     Isolated Muscles   Biceps: 4   Triceps: 3+     Right Shoulder   Normal muscle strength    Additional Strength Details  L shoulder MMT not assessed due to precautions   Strength (Lv II in neutral): Left 15, 13, 13 lbs; Right 25, 20, 18 lbs             Diagnosis: L proximal humerus fracture (DOI 8/13/2022)  Precautions: no strengthening for 6 weeks (till 10/11); HTN, DM, hx of chemo, recent nephro stent (9/19/2022)  ( * astericks indicates given per HEP)    Manuals 9/21         STM DK         IASTM          PROM DK                   Neuro Re-Ed          scap squeezes x10         Shoulder iso          Pendulums x10                                                 Ther Ex          Elbow flex/ext          physioball rollouts seated          Table slides flex and ABD          AAROM ER with cane                                        Ther Activity          Gripping Pt Ed HEP, POC         Re-Evaluation          Modalities          Heat/ice (prn)

## 2022-09-23 ENCOUNTER — OFFICE VISIT (OUTPATIENT)
Dept: PHYSICAL THERAPY | Facility: CLINIC | Age: 67
End: 2022-09-23
Payer: COMMERCIAL

## 2022-09-23 DIAGNOSIS — S42.295D OTHER CLOSED NONDISPLACED FRACTURE OF PROXIMAL END OF LEFT HUMERUS WITH ROUTINE HEALING, SUBSEQUENT ENCOUNTER: Primary | ICD-10-CM

## 2022-09-23 PROCEDURE — 97110 THERAPEUTIC EXERCISES: CPT

## 2022-09-23 PROCEDURE — 97010 HOT OR COLD PACKS THERAPY: CPT

## 2022-09-23 PROCEDURE — 97140 MANUAL THERAPY 1/> REGIONS: CPT

## 2022-09-23 NOTE — PROGRESS NOTES
Daily Note     Today's date: 2022  Patient name: Jase Power  : 1955  MRN: [id number]  Referring provider: Katrina Rodriguez DO  Dx:   Encounter Diagnosis     ICD-10-CM    1  Other closed nondisplaced fracture of proximal end of left humerus with routine healing, subsequent encounter  S42 295D        Start Time: 1115  Stop Time: 1200  Total time in clinic (min): 45 minutes    Subjective: Patient reports her shoulder is sore after IE  Patient reports she has not tried ice pack for shoulder at home  Objective: See treatment diary below      Assessment: Tolerated treatment well  Initiated session with gentle PROM of L shoulder in supine  Patient noted to have pain and restrictions primarily into ER and ABD  Added seated physioball to improve forward reaching mobility as tolerable  Concluded session with ice pack to L shoulder with positive response  Educated patient to use cold pack at home to relieve fatigue  Patient exhibited good technique with therapeutic exercises and would benefit from continued PT      Plan: Continue per plan of care  Progress treatment as tolerated         Diagnosis: L proximal humerus fracture (DOI 2022)  Precautions: no strengthening for 6 weeks (till 10/11); HTN, DM, hx of chemo, recent nephro stent (2022)  ( * astericks indicates given per HEP)    Manuals         STM DK DK        IASTM          PROM DK DK                  Neuro Re-Ed          scap squeezes* x10 2x10        BWD shoulder rolls  x15        Shoulder iso          Pendulums* x10 x10                            Ther Ex          Elbow flex/ext*  2x10        physioball rollouts seated  2x5        Table slides flex and ABD          AAROM ER with cane                                        Ther Activity          Gripping  Green 30" x2                            Pt Ed HEP, POC         Re-Evaluation          Modalities          Heat/ice (prn)  Ice x8'

## 2022-09-23 NOTE — PROGRESS NOTES
Encounter opened in error  Pt's tx was cancelled today  Voice message received from pt notifying me that she will not be here for her appt today  Called pt back, no answer  Left a voice message letting her know that I will see her on 10/24 during her infusion and to call me back if this will not work for her

## 2022-09-26 ENCOUNTER — OFFICE VISIT (OUTPATIENT)
Dept: PHYSICAL THERAPY | Facility: CLINIC | Age: 67
End: 2022-09-26
Payer: COMMERCIAL

## 2022-09-26 DIAGNOSIS — S42.295D OTHER CLOSED NONDISPLACED FRACTURE OF PROXIMAL END OF LEFT HUMERUS WITH ROUTINE HEALING, SUBSEQUENT ENCOUNTER: Primary | ICD-10-CM

## 2022-09-26 PROCEDURE — 97140 MANUAL THERAPY 1/> REGIONS: CPT

## 2022-09-26 PROCEDURE — 97110 THERAPEUTIC EXERCISES: CPT

## 2022-09-26 NOTE — PROGRESS NOTES
Daily Note     Today's date: 2022  Patient name: Stevie Mohr  : 1955  MRN: [id number]  Referring provider: Albertina Giang DO  Dx:   Encounter Diagnosis     ICD-10-CM    1  Other closed nondisplaced fracture of proximal end of left humerus with routine healing, subsequent encounter  S42 445D                   Subjective: Patient presents to PT in sling, states she is sore today that it may be weather related  Upon walking back for session she reported feel "weird" and "woozy "      Objective: See treatment diary below      Assessment:  Attempted to take patients BP due to symptoms  BP cuff was too large and did not have a pediatric cuff available  She sat and rested with feet up for a few min and said she felt better and decided to continue therapy  Tolerated treatment fair  Slight restriction felt with PROM, discomfort felt  Close supervision during standing PB roll outs for safety  She was able to perform table slides with a slight increase in ROM  Ice at end gave relief  Patient demonstrated fatigue post treatment and would benefit from continued PT      Plan: Continue per plan of care        Diagnosis: L proximal humerus fracture (DOI 2022)  Precautions: no strengthening for 6 weeks (till 10/11); HTN, DM, hx of chemo, recent nephro stent (2022)  ( * astericks indicates given per HEP)    Manuals         STM DK DK SAM       IASTM          PROM JENN BARAJAS SAM- gentle                  Neuro Re-Ed          scap squeezes* x10 2x10 2x10       BWD shoulder rolls  x15 x20        Shoulder iso          Pendulums* x10 x10 held                           Ther Ex          Elbow flex/ext*  2x10 supine 2x10       physioball rollouts seated  2x5 Close supervision standing 20x flex/scap with purple ball       Table slides flex and ABD   Flex 15x  Scap 10x  ea 5-10 sec hold        AAROM ER with cane                                        Ther Activity          Gripping  Green 30" x2 Green 30x  3" Pt Ed HEP, POC         Re-Evaluation          Modalities          Heat/ice (prn)  Ice x8' Ice x8'

## 2022-09-29 ENCOUNTER — TELEPHONE (OUTPATIENT)
Dept: OTHER | Facility: HOSPITAL | Age: 67
End: 2022-09-29

## 2022-09-29 ENCOUNTER — OFFICE VISIT (OUTPATIENT)
Dept: PHYSICAL THERAPY | Facility: CLINIC | Age: 67
End: 2022-09-29
Payer: COMMERCIAL

## 2022-09-29 ENCOUNTER — PATIENT MESSAGE (OUTPATIENT)
Dept: UROLOGY | Facility: AMBULATORY SURGERY CENTER | Age: 67
End: 2022-09-29

## 2022-09-29 ENCOUNTER — TELEPHONE (OUTPATIENT)
Dept: UROLOGY | Facility: HOSPITAL | Age: 67
End: 2022-09-29

## 2022-09-29 DIAGNOSIS — S42.295D OTHER CLOSED NONDISPLACED FRACTURE OF PROXIMAL END OF LEFT HUMERUS WITH ROUTINE HEALING, SUBSEQUENT ENCOUNTER: Primary | ICD-10-CM

## 2022-09-29 DIAGNOSIS — R39.9 UTI SYMPTOMS: Primary | ICD-10-CM

## 2022-09-29 DIAGNOSIS — C54.1 ENDOMETRIAL CANCER (HCC): Primary | ICD-10-CM

## 2022-09-29 PROCEDURE — 97110 THERAPEUTIC EXERCISES: CPT

## 2022-09-29 PROCEDURE — 97010 HOT OR COLD PACKS THERAPY: CPT

## 2022-09-29 PROCEDURE — 97140 MANUAL THERAPY 1/> REGIONS: CPT

## 2022-09-29 NOTE — TELEPHONE ENCOUNTER
I spoke to patient and she is having increased urinary urgency voiding 3 times every half hour  Denies any abdominal pain, fevers, chills  She is set to have her urine checked tomorrow as an every 3 week regimen for chemo  She is seeking a medication be added with her Oxybutinin to relieve her of this urinary incontinence and urinary urgency issue  Please advise

## 2022-09-29 NOTE — PROGRESS NOTES
Assessment/Plan:    Problem List Items Addressed This Visit        Genitourinary    Endometrial cancer ([address] ) - Primary (Chronic)     72-year-old with recurrent stage IB endometrial cancer who is receiving treatment per the [facility] trial presents for pre cycle visit  CBC, CMP, mag,  reviewed and all hematologic parameters support continued treatment    Avastin has been held for [facility] ratio elevation in the past   Given significant weight loss and grade 3 mucositis as well as persistently elevated urine protein to creatinine we discussed holding all chemotherapy for this cycle to allow further workup and improvement  We will plan to restart with next cycle after re-evaluation           Obstruction of right ureter     PCN use currently capped and she is tolerating without difficulty  She is reporting worsening of her overactive bladder symptoms now that she is urinating more frequently  She has follow-up with Urology for internalization in November         Nephrotoxicity     Patient's protein to creatinine ratio has been persistently elevated with slight improvement prior to confusion  I discussed with patient possible permanent kidney damage in the setting of prolonged Avastin use  I have ordered a 24 hour urine protein for further workup and discussed referral to Nephrology to ensure no underlying renal pathology prior to reinitiating Avastin  Relevant Orders    Ambulatory Referral to Nephrology    Protein, Total W/Creat, 24 Hour Urine       Other    Moderate protein-calorie malnutrition ([address] )     Malnutrition Findings:     5 lb weight loss since last visit  Most likely secondary to worsening mucositis  Is followed by nutrition                             BMI Findings: Body mass index is 24 64 kg/m²  Nephrostomy status ([address] )    Mucositis     Most likely related to rucaparib  Grade 3 with significant weight loss noted  Continue magic mouthwash for comfort    We will give low-dose steroid taper and hold rucaparib for this cycle         Relevant Medications    methylprednisolone (MEDROL) 4 mg tablet            CHIEF COMPLAINT: chemo continuation      Problem:  Cancer Staging  Endometrial cancer ([facility] [address] )  Staging form: Corpus Uteri - Carcinoma, AJCC 7th Edition  - Clinical stage from 12/19/2017: FIGO Stage IB (T1b, N0, M0) - Signed by Brigid Vivas MD on 2/12/2018        Previous therapy:  Oncology History   Endometrial cancer ([facility] [address] )   11/17/2017 Initial Diagnosis    Endometrial cancer ([facility] [address] )     11/17/2017 Biopsy    ENDOMETRIAL BIOPSY: WELL DIFFERENTIATED ENDOMETRIAL ADENOCARCINOMA (FIGO I) WITH FOCALMUCINOUS FEATURES  Part B: ENDOCERVICAL POLYP:BENIGN ENDOCERVICAL      12/19/2017 Surgery    Robotic assisted total laparoscopic hysterectomy with bilateral salpingo-oophorectomy and sentinel bilateral pelvic lymph node dissection  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 (4 4 x 3 2 cm tumor, 9 4/15 4mm invasion, NO LVSI, washings revealed atypical cellular changes)     12/19/2017 Genetic Testing    Morrison testing negative     6/28/2019 Biopsy    A  Breast, Right, US BX Right Breast 1000 4 cmfn:  - Benign breast tissue with focal histiocytic aggregate  See comment   - Negative for atypia and in-situ or invasive carcinoma  7/8/2019 Recurrence    Presented with right lower extremity DVT and CT demonstrating right pelvic sidewall mass with venous, ureteral and nerve compression causing significant neuropathic pain  Biopsy:  Lymph Node, Right pelvic lymph node x3:  - High-grade adenocarcinoma  7/30/2019 - 1/6/2020 Chemotherapy    Taxol 175 mg/m2 and carboplatin AUC 6 every 21 days  Dose was reduced to taxol 135 mg/m2 and carboplatin AUC 5  Completed 6 cycles  Treatment protracted due to multiple hospital admissions       2/24/2020 - 4/13/2020 Radiation    adjuvant external beam radiation therapy to the whole pelvis to 4500 cGy followed by boost to gross disease of an additional 2200 cGy     11/23/2020 Progression    New necrotic adenopathy in the retroperitoneum on CT      12/21/2020 -  Chemotherapy    Began chemotherapy with rucaparib, atezolizumab, and bevacizumab as per [facility] clinical trial            Patient ID: Judi Eubanks is a 74 y o  female  26-year-old with recurrent stage IB endometrial cancer who is receiving treatment per the [facility] trial presents for pre cycle visit  Recently had PCNU capped on 9/19 and has follow-up with Urology for possible internalization in November  She reports that she is been having overactive bladder symptoms but she had prior to the PCN placement in that she is urinating larger volume feels it is occurring again  She also notes hematuria at times  She is had difficulty eating in the last few weeks given persistent mucositis and has had noticeable weight loss  She is still healing from a left upper extremity fracture  She also was recently diagnosed with COVID and has a persistent cough and weakness noted  The following portions of the patient's history were reviewed and updated as appropriate: allergies, current medications, past family history, past medical history, past social history, past surgical history and problem list     Review of Systems   Constitutional: Positive for appetite change and fatigue  Negative for chills and fever  HENT: Positive for mouth sores and trouble swallowing  Respiratory: Positive for cough  Negative for chest tightness and shortness of breath  Gastrointestinal: Negative for abdominal distention, abdominal pain, constipation, diarrhea and nausea  Genitourinary: Negative for difficulty urinating, flank pain, frequency, urgency, vaginal bleeding, vaginal discharge and vaginal pain  Musculoskeletal: Negative for back pain, joint swelling and myalgias  Skin: Negative for rash  Neurological: Negative for dizziness, light-headedness, numbness and headaches  Current Outpatient Medications   Medication Sig Dispense Refill    ARIPiprazole (ABILIFY) 20 MG tablet Take 20 mg by mouth in the morning   aspirin (ECOTRIN LOW STRENGTH) 81 mg EC tablet Take 81 mg by mouth daily      BD PosiFlush 0 9 % SOLN       Calcium-Magnesium-Vitamin D (CALCIUM 500 PO) Take by mouth daily       cholecalciferol (VITAMIN D3) 1,000 units tablet Take 1,000 Units by mouth daily      Cranberry-Vitamin C 250-60 MG CAPS Take 1 tablet by mouth in the morning 90 capsule 3    Cyanocobalamin (VITAMIN B12 PO) Take by mouth daily       dronabinol (MARINOL) 2 5 mg capsule Take 1 capsule (2 5 mg total) by mouth 2 (two) times a day before meals 30 capsule 0    enoxaparin (LOVENOX) 80 mg/0 8 mL Inject 0 8 mL (80 mg total) under the skin every 24 hours 72 mL 1    estradiol (ESTRACE VAGINAL) 0 1 mg/g vaginal cream Insert 1g nightly for 6 weeks, then 1-2x weekly  66 2 g 1    folic acid (FOLVITE) 1 mg tablet Take 1 tablet (1 mg total) by mouth daily  0    gemfibrozil (LOPID) 600 mg tablet TAKE 1 TABLET BY MOUTH EVERY DAY 90 tablet 0    LORazepam (ATIVAN) 0 5 mg tablet Take 1 tablet (0 5 mg total) by mouth every 6 (six) hours as needed for anxiety (or nausea) 36 tablet 1    methylprednisolone (MEDROL) 4 mg tablet Take 1 tablet (4 mg total) by mouth in the morning Take 2 tables once a day for 5 days, take 1 tablet once a day for 5 days, take 1 tablet every other day for 14 days  21 tablet 0    Multiple Vitamin (MULTIVITAMIN) tablet Take 1 tablet by mouth daily      naloxone (NARCAN) 4 mg/0 1 mL nasal spray Administer 1 spray into a nostril  If no response after 2-3 minutes, give another dose in the other nostril using a new spray   1 each 0    omeprazole (PriLOSEC) 20 mg delayed release capsule Take 20 mg by mouth daily      ondansetron (ZOFRAN) 8 mg tablet Take 1 tablet (8 mg total) by mouth every 8 (eight) hours as needed for nausea or vomiting 30 tablet 1    oxybutynin (DITROPAN XL) 15 MG 24 hr tablet TAKE 1 TABLET BY MOUTH DAILY AT BEDTIME 90 tablet 3    rucaparib (RUBRACA) 300 mg tablet Take 600 mg by mouth every 12 (twelve) hours Take with or without food  Do not repeat a vomited dose   saccharomyces boulardii (FLORASTOR) 250 mg capsule Take 1 capsule (250 mg total) by mouth 2 (two) times a day  0    senna (SENOKOT) 8 6 MG tablet Take 1 tablet (8 6 mg total) by mouth 2 (two) times a day as needed for constipation 60 tablet 0    sodium chloride, PF, 0 9 % 10 mL by Intracatheter route daily 300 mL 3    venlafaxine (EFFEXOR) 75 mg tablet Take 75 mg by mouth 3 (three) times a day        clotrimazole-betamethasone (LOTRISONE) 1-0 05 % cream Apply topically 2 (two) times a day (Patient not taking: No sig reported) 30 g 0     No current facility-administered medications for this visit  Facility-Administered Medications Ordered in Other Visits   Medication Dose Route Frequency Provider Last Rate Last Admin    INV atezolizumab (INVESTIGATIONAL) 1,200 mg in sodium chloride 0 9 % 250 mL infusion  1,200 mg Intravenous Once Estelle Busby PA-C        sodium chloride 0 9 % infusion  20 mL/hr Intravenous Continuous Luli Busby PA-C               Objective:    Blood pressure 122/84, pulse 77, temperature (!) 97 3 °F (36 3 °C), temperature source Temporal, resp  rate 16, height 4' 11" (1 499 m), weight 55 3 kg (122 lb), SpO2 100 %, not currently breastfeeding  Body mass index is 24 64 kg/m²  Body surface area is 1 49 meters squared  Physical Exam  HENT:      Head: Normocephalic and atraumatic  Nose: Nose normal    Cardiovascular:      Rate and Rhythm: Normal rate and regular rhythm  Pulmonary:      Effort: Pulmonary effort is normal    Abdominal:      General: There is no distension  Palpations: Abdomen is soft  There is no mass  Genitourinary:     Comments: defer  Musculoskeletal:         General: No swelling  Normal range of motion        Cervical back: Normal range of motion  Comments: Left arm in sling   Skin:     General: Skin is warm and dry  Neurological:      General: No focal deficit present  Mental Status: She is alert     Psychiatric:         Mood and Affect: Mood normal            Lab Results   Component Value Date     10/27/2017    K 4 1 10/01/2022     10/01/2022    CO2 25 10/01/2022    BUN 16 10/01/2022    CREATININE 1 07 10/01/2022    GLUCOSE 219 (H) 12/19/2017    GLUF 117 (H) 10/01/2022    CALCIUM 9 7 10/01/2022    CORRECTEDCA 9 4 07/29/2022    AST 22 10/01/2022    ALT 15 10/01/2022    ALKPHOS 156 (H) 10/01/2022    PROT 6 9 10/27/2017    BILITOT 0 3 10/27/2017    EGFR 54 10/01/2022     Lab Results   Component Value Date    WBC 5 86 10/01/2022    HGB 12 3 10/01/2022    HCT 38 3 10/01/2022    MCV 98 10/01/2022     10/01/2022     Lab Results   Component Value Date    NEUTROABS 4 44 10/01/2022        Trend:  No results found for:

## 2022-09-29 NOTE — TELEPHONE ENCOUNTER
----- Message from [facility] sent at 9/29/2022  9:33 AM EDT -----  Regarding: UTI  I am having difficult again with incontinence  At the present time I am taking oxybutynin by itself  In the past I took it with Myrbetriq but had to stop taking the Myrbetriq because it was causing pain in the vaginal area  I think I need to take another medicine in addition to the oxybutynin  Please let me know if this is possible  Thank you

## 2022-09-29 NOTE — TELEPHONE ENCOUNTER
Called Ragini back and she is quarantined for 5 days as she has COVID   Told a urine culture was placed in system for when she feels better

## 2022-09-29 NOTE — TELEPHONE ENCOUNTER
Recommend waiting for results of urine culture to rule out infection  If results are negative, consider adding Myrbetriq as well as referral to pelvic floor physical therapy for management of urinary incontinence

## 2022-09-29 NOTE — TELEPHONE ENCOUNTER
Telephone call from patient Alison Keita [birth year] on the [employer] study  Patient states that she took a home covid test and she is positive  Her symptoms started Tuesday   She had a mild sore throat and chest congestion that have all resolved  Patient states that her PCP told her to 5 day quarantine then mask for an additional 5  Patient situation was discussed with Grabiel Marx PAC  Patient will now be seen the day of treatment on 10/3 by Sam Salguero   Patient verbalized understanding of the appointment change

## 2022-09-29 NOTE — PROGRESS NOTES
Daily Note     Today's date: 2022  Patient name: Leisa Burr  : 1955  MRN: [id number]  Referring provider: Sharlene Salas DO  Dx:   Encounter Diagnosis     ICD-10-CM    1  Other closed nondisplaced fracture of proximal end of left humerus with routine healing, subsequent encounter  S42 295D        Start Time: 0815  Stop Time: 0900  Total time in clinic (min): 45 minutes    Subjective: Patient reports she is feeling better on arrival today  Objective: See treatment diary below      Assessment: Tolerated treatment well  Patient with steadily improving tolerance to PROM of L shoulder into all directions  Patient with good mobility and tolerance to self-stretches/AAROM of Left shoulder into flexion and scaption/ABD directions  Improved posture and ability to engage scapular muscle with seated scap squeezes  Patient exhibited good technique with therapeutic exercises and would benefit from continued PT      Plan: Continue per plan of care  Progress treatment as tolerated         Diagnosis: L proximal humerus fracture (DOI 2022)  Precautions: no strengthening for 6 weeks (till 10/11); HTN, DM, hx of chemo, recent nephro stent (2022)  ( * astericks indicates given per HEP)    Manuals       STM DK DK SAM DK      IASTM          PROM DK DK SAM- gentle  DK                Neuro Re-Ed          scap squeezes* x10 2x10 2x10 2x10      BWD shoulder rolls  x15 x20  2x10      Shoulder iso          Pendulums* x10 x10 held HEP                          Ther Ex          Elbow flex/ext*  2x10 supine 2x10 seated 2x10      physioball rollouts seated  2x5 Close supervision standing 20x flex/scap with purple ball Seated green ball 2x10      Table slides flex and ABD (standing)   Flex 15x  Scap 10x  ea 5-10 sec hold  Flex 15x  Scap 10x  ea 3-5 sec hold       AAROM ER with cane                                        Ther Activity          Gripping  Green 30" x2 Green 30x  3" Green 30" x2 Pt Ed HEP, POC         Re-Evaluation          Modalities          Heat/ice (prn)  Ice x8' Ice x8' Ice x8'

## 2022-09-29 NOTE — ASSESSMENT & PLAN NOTE
77-year-old with recurrent stage IB endometrial cancer who is receiving treatment per the [facility] trial presents for pre cycle visit  CBC, CMP, mag,  reviewed and all hematologic parameters support continued treatment    Avastin has been held for [facility] ratio elevation in the past   Given significant weight loss and grade 3 mucositis as well as persistently elevated urine protein to creatinine we discussed holding all chemotherapy for this cycle to allow further workup and improvement      We will plan to restart with next cycle after re-evaluation

## 2022-09-30 ENCOUNTER — HOSPITAL ENCOUNTER (OUTPATIENT)
Dept: INFUSION CENTER | Facility: HOSPITAL | Age: 67
Discharge: HOME | End: 2022-09-30

## 2022-09-30 RX ORDER — SODIUM CHLORIDE 9 MG/ML
20 INJECTION, SOLUTION INTRAVENOUS CONTINUOUS
Status: DISCONTINUED | OUTPATIENT
Start: 2022-10-03 | End: 2022-10-06 | Stop reason: HOSPADM

## 2022-10-01 ENCOUNTER — APPOINTMENT (OUTPATIENT)
Dept: LAB | Facility: CLINIC | Age: 67
End: 2022-10-01
Payer: COMMERCIAL

## 2022-10-01 DIAGNOSIS — C54.1 ENDOMETRIAL CANCER (HCC): ICD-10-CM

## 2022-10-01 LAB
ALBUMIN SERPL BCP-MCNC: 4.2 G/DL (ref 3.5–5)
ALP SERPL-CCNC: 156 U/L (ref 34–104)
ALT SERPL W P-5'-P-CCNC: 15 U/L (ref 7–52)
AMYLASE SERPL-CCNC: 19 IU/L (ref 29–103)
ANION GAP SERPL CALCULATED.3IONS-SCNC: 10 MMOL/L (ref 4–13)
APTT PPP: 48 SECONDS (ref 23–37)
AST SERPL W P-5'-P-CCNC: 22 U/L (ref 13–39)
BACTERIA UR QL AUTO: ABNORMAL /HPF
BASOPHILS # BLD AUTO: 0.02 THOUSANDS/ΜL (ref 0–0.1)
BASOPHILS NFR BLD AUTO: 0 % (ref 0–1)
BILIRUB SERPL-MCNC: 0.29 MG/DL (ref 0.2–1)
BILIRUB UR QL STRIP: NEGATIVE
BUDDING YEAST: PRESENT
BUN SERPL-MCNC: 16 MG/DL (ref 5–25)
CALCIUM SERPL-MCNC: 9.7 MG/DL (ref 8.4–10.2)
CHLORIDE SERPL-SCNC: 103 MMOL/L (ref 96–108)
CHOLEST SERPL-MCNC: 170 MG/DL
CLARITY UR: ABNORMAL
CO2 SERPL-SCNC: 25 MMOL/L (ref 21–32)
COLOR UR: ABNORMAL
CREAT SERPL-MCNC: 1.07 MG/DL (ref 0.6–1.3)
CREAT UR-MCNC: 58.6 MG/DL
EOSINOPHIL # BLD AUTO: 0 THOUSAND/ΜL (ref 0–0.61)
EOSINOPHIL NFR BLD AUTO: 0 % (ref 0–6)
ERYTHROCYTE [DISTWIDTH] IN BLOOD BY AUTOMATED COUNT: 12.9 % (ref 11.6–15.1)
GFR SERPL CREATININE-BSD FRML MDRD: 54 ML/MIN/1.73SQ M
GGT SERPL-CCNC: 12 U/L (ref 5–85)
GLUCOSE P FAST SERPL-MCNC: 117 MG/DL (ref 65–99)
GLUCOSE UR STRIP-MCNC: NEGATIVE MG/DL
HCT VFR BLD AUTO: 38.3 % (ref 34.8–46.1)
HGB BLD-MCNC: 12.3 G/DL (ref 11.5–15.4)
HGB UR QL STRIP.AUTO: ABNORMAL
IMM GRANULOCYTES # BLD AUTO: 0.06 THOUSAND/UL (ref 0–0.2)
IMM GRANULOCYTES NFR BLD AUTO: 1 % (ref 0–2)
INR PPP: 1.06 (ref 0.84–1.19)
KETONES UR STRIP-MCNC: NEGATIVE MG/DL
LEUKOCYTE ESTERASE UR QL STRIP: ABNORMAL
LIPASE SERPL-CCNC: 59 U/L (ref 11–82)
LYMPHOCYTES # BLD AUTO: 0.8 THOUSANDS/ΜL (ref 0.6–4.47)
LYMPHOCYTES NFR BLD AUTO: 14 % (ref 14–44)
MAGNESIUM SERPL-MCNC: 1.8 MG/DL (ref 1.9–2.7)
MCH RBC QN AUTO: 31.5 PG (ref 26.8–34.3)
MCHC RBC AUTO-ENTMCNC: 32.1 G/DL (ref 31.4–37.4)
MCV RBC AUTO: 98 FL (ref 82–98)
MONOCYTES # BLD AUTO: 0.54 THOUSAND/ΜL (ref 0.17–1.22)
MONOCYTES NFR BLD AUTO: 9 % (ref 4–12)
NEUTROPHILS # BLD AUTO: 4.44 THOUSANDS/ΜL (ref 1.85–7.62)
NEUTS SEG NFR BLD AUTO: 76 % (ref 43–75)
NITRITE UR QL STRIP: NEGATIVE
NON-SQ EPI CELLS URNS QL MICRO: ABNORMAL /HPF
NRBC BLD AUTO-RTO: 0 /100 WBCS
PH UR STRIP.AUTO: 6 [PH]
PHOSPHATE SERPL-MCNC: 3.6 MG/DL (ref 2.3–4.1)
PLATELET # BLD AUTO: 252 THOUSANDS/UL (ref 149–390)
PMV BLD AUTO: 10.7 FL (ref 8.9–12.7)
POTASSIUM SERPL-SCNC: 4.1 MMOL/L (ref 3.5–5.3)
PROT SERPL-MCNC: 7.9 G/DL (ref 6.4–8.4)
PROT UR STRIP-MCNC: ABNORMAL MG/DL
PROT UR-MCNC: 351 MG/DL
PROT/CREAT UR: 5.99 MG/G{CREAT} (ref 0–0.1)
PROTHROMBIN TIME: 14 SECONDS (ref 11.6–14.5)
RBC # BLD AUTO: 3.9 MILLION/UL (ref 3.81–5.12)
RBC #/AREA URNS AUTO: ABNORMAL /HPF
SODIUM SERPL-SCNC: 138 MMOL/L (ref 135–147)
SP GR UR STRIP.AUTO: 1.01 (ref 1–1.03)
T3 SERPL-MCNC: 1.3 NG/ML (ref 0.6–1.8)
T4 FREE SERPL-MCNC: 1.14 NG/DL (ref 0.76–1.46)
TSH SERPL DL<=0.05 MIU/L-ACNC: 1.47 UIU/ML (ref 0.45–4.5)
UROBILINOGEN UR STRIP-ACNC: <2 MG/DL
WBC # BLD AUTO: 5.86 THOUSAND/UL (ref 4.31–10.16)
WBC #/AREA URNS AUTO: ABNORMAL /HPF
WBC CLUMPS # UR AUTO: PRESENT /UL

## 2022-10-01 PROCEDURE — 36415 COLL VENOUS BLD VENIPUNCTURE: CPT

## 2022-10-01 PROCEDURE — 82465 ASSAY BLD/SERUM CHOLESTEROL: CPT

## 2022-10-01 PROCEDURE — 83690 ASSAY OF LIPASE: CPT

## 2022-10-01 PROCEDURE — 82977 ASSAY OF GGT: CPT

## 2022-10-01 PROCEDURE — 82150 ASSAY OF AMYLASE: CPT

## 2022-10-01 PROCEDURE — 85730 THROMBOPLASTIN TIME PARTIAL: CPT

## 2022-10-01 PROCEDURE — 84480 ASSAY TRIIODOTHYRONINE (T3): CPT

## 2022-10-01 PROCEDURE — 84439 ASSAY OF FREE THYROXINE: CPT

## 2022-10-01 PROCEDURE — 85610 PROTHROMBIN TIME: CPT

## 2022-10-01 PROCEDURE — 82570 ASSAY OF URINE CREATININE: CPT

## 2022-10-01 PROCEDURE — 80053 COMPREHEN METABOLIC PANEL: CPT

## 2022-10-01 PROCEDURE — 84443 ASSAY THYROID STIM HORMONE: CPT

## 2022-10-01 PROCEDURE — 83735 ASSAY OF MAGNESIUM: CPT

## 2022-10-01 PROCEDURE — 84100 ASSAY OF PHOSPHORUS: CPT

## 2022-10-01 PROCEDURE — 84156 ASSAY OF PROTEIN URINE: CPT

## 2022-10-01 PROCEDURE — 81001 URINALYSIS AUTO W/SCOPE: CPT

## 2022-10-01 PROCEDURE — 85025 COMPLETE CBC W/AUTO DIFF WBC: CPT

## 2022-10-03 ENCOUNTER — OFFICE VISIT (OUTPATIENT)
Dept: GYNECOLOGIC ONCOLOGY | Facility: CLINIC | Age: 67
End: 2022-10-03
Payer: COMMERCIAL

## 2022-10-03 ENCOUNTER — DOCUMENTATION (OUTPATIENT)
Dept: OTHER | Facility: HOSPITAL | Age: 67
End: 2022-10-03

## 2022-10-03 ENCOUNTER — NUTRITION (OUTPATIENT)
Dept: NUTRITION | Facility: CLINIC | Age: 67
End: 2022-10-03

## 2022-10-03 ENCOUNTER — HOSPITAL ENCOUNTER (OUTPATIENT)
Dept: INFUSION CENTER | Facility: CLINIC | Age: 67
Discharge: HOME | End: 2022-10-03

## 2022-10-03 VITALS
HEART RATE: 77 BPM | TEMPERATURE: 97.3 F | SYSTOLIC BLOOD PRESSURE: 122 MMHG | RESPIRATION RATE: 16 BRPM | OXYGEN SATURATION: 100 % | WEIGHT: 122 LBS | BODY MASS INDEX: 24.6 KG/M2 | DIASTOLIC BLOOD PRESSURE: 84 MMHG | HEIGHT: 59 IN

## 2022-10-03 DIAGNOSIS — K12.30 MUCOSITIS: ICD-10-CM

## 2022-10-03 DIAGNOSIS — N14.4 NEPHROTOXICITY: ICD-10-CM

## 2022-10-03 DIAGNOSIS — N13.5 OBSTRUCTION OF RIGHT URETER: ICD-10-CM

## 2022-10-03 DIAGNOSIS — Z93.6 NEPHROSTOMY STATUS (HCC): ICD-10-CM

## 2022-10-03 DIAGNOSIS — E44.0 MODERATE PROTEIN-CALORIE MALNUTRITION (HCC): ICD-10-CM

## 2022-10-03 DIAGNOSIS — C54.1 ENDOMETRIAL CANCER (HCC): Primary | Chronic | ICD-10-CM

## 2022-10-03 DIAGNOSIS — Z71.3 NUTRITIONAL COUNSELING: Primary | ICD-10-CM

## 2022-10-03 PROCEDURE — 99215 OFFICE O/P EST HI 40 MIN: CPT | Performed by: OBSTETRICS & GYNECOLOGY

## 2022-10-03 RX ORDER — METHYLPREDNISOLONE 4 MG/1
4 TABLET ORAL DAILY
Qty: 21 TABLET | Refills: 0 | Status: SHIPPED | OUTPATIENT
Start: 2022-10-03

## 2022-10-03 RX ORDER — SODIUM CHLORIDE, PRESERVATIVE FREE 5 ML
INJECTION INTRAVENOUS
COMMUNITY
Start: 2022-09-19

## 2022-10-03 NOTE — ASSESSMENT & PLAN NOTE
Malnutrition Findings:     5 lb weight loss since last visit  Most likely secondary to worsening mucositis  Is followed by nutrition                             BMI Findings: Body mass index is 24 64 kg/m²

## 2022-10-03 NOTE — ASSESSMENT & PLAN NOTE
Most likely related to rucaparib  Grade 3 with significant weight loss noted  Continue magic mouthwash for comfort    We will give low-dose steroid taper and hold rucaparib for this cycle

## 2022-10-03 NOTE — ASSESSMENT & PLAN NOTE
PCN use currently capped and she is tolerating without difficulty  She is reporting worsening of her overactive bladder symptoms now that she is urinating more frequently    She has follow-up with Urology for internalization in November

## 2022-10-03 NOTE — ASSESSMENT & PLAN NOTE
Patient's protein to creatinine ratio has been persistently elevated with slight improvement prior to confusion  I discussed with patient possible permanent kidney damage in the setting of prolonged Avastin use  I have ordered a 24 hour urine protein for further workup and discussed referral to Nephrology to ensure no underlying renal pathology prior to reinitiating Avastin

## 2022-10-03 NOTE — PROGRESS NOTES
Ragini Melendez (1955) is patient #[id number] on the [facility] clinical trial  Pt was seen for pre-treatment office visit and physical exam With Dr Deepthi Palafox  Patient AE and Conmeds where reviewed  Patient did test positive for covid on 9/27/2022 but states she only has a lingering cough  Patient labs were reviewed by Ben aMn, due to her elevation in her Protein / creat ratio her Avastin would have to be held per study protocol  Patient was also found to have mouth sores with associated weight loss  Ben Man discussed with patient and all of  Cycle 32 was held  Pateint was given a steriod taper for her mouth sores and a consult to nephrology was made as well as ordered a 24hr urine to check kidney function  Patient was informed that we would hold this cycle but reevaluate her again prior to cycle 33  Patient was in agreement with this plan  Patient was instructed to call if she had any questions or concerns  Cycle 31 pills and diary was collected

## 2022-10-03 NOTE — PROGRESS NOTES
Time Out:     Received for Christofer Ochoa RN    Holding Cycle 32 today for increased Urine Protein / Creatinine Ratio  Re-evaluate in 3 weeks for Cycle 33  New orders to follow    Pharmacy aware

## 2022-10-04 ENCOUNTER — OFFICE VISIT (OUTPATIENT)
Dept: PHYSICAL THERAPY | Facility: CLINIC | Age: 67
End: 2022-10-04
Payer: COMMERCIAL

## 2022-10-04 DIAGNOSIS — S42.295D OTHER CLOSED NONDISPLACED FRACTURE OF PROXIMAL END OF LEFT HUMERUS WITH ROUTINE HEALING, SUBSEQUENT ENCOUNTER: Primary | ICD-10-CM

## 2022-10-04 PROCEDURE — 97010 HOT OR COLD PACKS THERAPY: CPT

## 2022-10-04 PROCEDURE — 97140 MANUAL THERAPY 1/> REGIONS: CPT

## 2022-10-04 NOTE — PROGRESS NOTES
Daily Note     Today's date: 10/4/2022  Patient name: Mary Thompson  : 1955  MRN: [id number]  Referring provider: Ann Curtis DO  Dx:   Encounter Diagnosis     ICD-10-CM    1  Other closed nondisplaced fracture of proximal end of left humerus with routine healing, subsequent encounter  S42 295D        Start Time: 930  Stop Time: 1015  Total time in clinic (min): 45 minutes    Subjective: Patient reports she was suppose to have MD follow up today, but her ride was unable to drive due to the weather  She reports she rescheduled for next week  Patient reports she has achy pain with exercises, but no sharp pain  She reports she has not been doing ice pack at home  Objective: See treatment diary below      Assessment: Tolerated treatment well  Educated patient on using ice pack for L shoulder especially after exercises  Patient completed exercises with good effort, improving mobility  Patient continues to have restrictions in ER and ABD  Added supine ER AAROM to tolerable ROM  Patient exhibited good technique with therapeutic exercises and would benefit from continued PT      Plan: Continue per plan of care  Progress treatment as tolerated         Diagnosis: L proximal humerus fracture (DOI 2022)  Precautions: no strengthening for 6 weeks (till 10/11); HTN, DM, hx of chemo, recent nephro stent (2022)  ( * astericks indicates given per HEP)    Manuals 9/21 9/23 9/26  9/29 10/4     STM DK DK SAM DK DK     IASTM          PROM DK DK SAM- gentle  DK DK               Neuro Re-Ed          scap squeezes* x10 2x10 2x10 2x10 2x10     BWD shoulder rolls  x15 x20  2x10 2x10     Shoulder iso          Pendulums* x10 x10 held HEP                          Ther Ex          Elbow flex/ext*  2x10 supine 2x10 seated 2x10 supine 2x10     physioball rollouts seated  2x5 Close supervision standing 20x flex/scap with purple ball Seated green ball 2x10 Seated green ball 2x10     Table slides flex and ABD (standing) Flex 15x  Scap 10x  ea 5-10 sec hold  Flex 15x  Scap 10x  ea 3-5 sec hold  Flex 2x10  Scap 2x10, 3-5 sec hold ea     AAROM ER with cane     supine 2x10                                   Ther Activity          Gripping  Green 30" x2 Green 30x  3" Green 30" x2 Green 30" x2                         Pt Ed HEP, POC         Re-Evaluation          Modalities          Heat/ice (prn)  Ice x8' Ice x8' Ice x8' Ice x8'

## 2022-10-05 ENCOUNTER — APPOINTMENT (OUTPATIENT)
Dept: PHYSICAL THERAPY | Facility: CLINIC | Age: 67
End: 2022-10-05

## 2022-10-05 DIAGNOSIS — N39.46 MIXED STRESS AND URGE URINARY INCONTINENCE: ICD-10-CM

## 2022-10-05 RX ORDER — OXYBUTYNIN CHLORIDE 15 MG/1
15 TABLET, EXTENDED RELEASE ORAL
Qty: 90 TABLET | Refills: 3 | Status: SHIPPED | OUTPATIENT
Start: 2022-10-05

## 2022-10-05 NOTE — TELEPHONE ENCOUNTER
An Auto-fax Refill Request for Oxybutynin ER 15mg was received from [facility]/pharmacy #[id number]  The patient was last seen on 7/12/22 by RAFITA Griffin in the [place] location; continuation of the medication was authorized at that time    Request for same, 90 day supply with 3 refills was queued and forwarded to the Advanced Practitioner covering the [place] location for approval

## 2022-10-06 ENCOUNTER — TELEPHONE (OUTPATIENT)
Dept: UROLOGY | Facility: AMBULATORY SURGERY CENTER | Age: 67
End: 2022-10-06

## 2022-10-06 NOTE — TELEPHONE ENCOUNTER
Spoke with lab  Urine was discarded already  She would need to repeat   MyChart message sent explanting such

## 2022-10-06 NOTE — TELEPHONE ENCOUNTER
Regarding: UTI  ----- Message from Manohar Vera RN sent at 10/6/2022  2:36 PM EDT -----  I only see a UA from 10/1     ----- Message from [address] [place] [address] to [address]  sent at 10/6/2022  2:22 PM -----   What is the result of my urine culture?        ----- Message -----       From:Ragini Melendez       Sent:9/29/2022 11:30 AM EDT         To:RAFITA Chakraborty    Subject:UTI    I am in quarantine until Saturday  I will go to the lab and have a urine culture done with my other tests on Saturday       ----- Message -----       From:Ragini Melendez       Sent:9/29/2022  9:33 AM EDT         To:RAFITA Chakraborty    Subject:UTI    I am having difficult again with incontinence  At the present time I am taking oxybutynin by itself  In the past I took it with Myrbetriq but had to stop taking the Myrbetriq because it was causing pain in the vaginal area  I think I need to take another medicine in addition to the oxybutynin  Please let me know if this is possible  Thank you       ----- Message -----       From:Beth Alva RN       Sent:8/16/2022  2:12 PM EDT         To:Ragini Melendez    Subject:UTI    Your urine culture was negative       ----- Message -----       From:Ragini Melendez       Sent:8/16/2022  2:05 PM EDT         Yusuf Garza    Subject:UTI    Are the results of my urine culture in?       ----- Message -----       From:Beth Alva RN       Sent:8/9/2022 10:47 AM EDT         Demetrius Ferguson    Subject:UTI    I placed orders for urine testing for you  You can go to any  lab to have them done or if you use an outside lab let us know and we can get the scripts to you  Make sure you increase your water intake and we will call once we get the results in about 2-3 days  Once you give your sample you may try OTC AZO to help with the burning and bladder discomfort   Feel better soon!      ----- Message -----       From:Ragini Melendez       Sent:8/9/2022  9:13 AM EDT Fatemeh Thurston    Subject:UTI    I have been trying to call the office but there is over an hour wait each time I called so I decided to try a message  I believe I have a UTI - I have burning when I urinate, the frequency of urination has increased and Emily noticed blood in the urine in my nephrostomy tube  I assume I will need some urine lab tests so if you could enter them in the computer and let me know, I will have them done ASAP  Thanks!

## 2022-10-06 NOTE — TELEPHONE ENCOUNTER
Urine culture was ordered, but it does not appear laboratory performed test   Can you please call to confirm? Please ask patient if she is still symptomatic

## 2022-10-07 ENCOUNTER — APPOINTMENT (OUTPATIENT)
Dept: LAB | Facility: CLINIC | Age: 67
End: 2022-10-07
Payer: COMMERCIAL

## 2022-10-07 ENCOUNTER — OFFICE VISIT (OUTPATIENT)
Dept: PHYSICAL THERAPY | Facility: CLINIC | Age: 67
End: 2022-10-07
Payer: COMMERCIAL

## 2022-10-07 DIAGNOSIS — S42.295D OTHER CLOSED NONDISPLACED FRACTURE OF PROXIMAL END OF LEFT HUMERUS WITH ROUTINE HEALING, SUBSEQUENT ENCOUNTER: Primary | ICD-10-CM

## 2022-10-07 PROCEDURE — 97010 HOT OR COLD PACKS THERAPY: CPT

## 2022-10-07 PROCEDURE — 97110 THERAPEUTIC EXERCISES: CPT

## 2022-10-07 PROCEDURE — 87086 URINE CULTURE/COLONY COUNT: CPT

## 2022-10-07 PROCEDURE — 97140 MANUAL THERAPY 1/> REGIONS: CPT

## 2022-10-07 NOTE — PROGRESS NOTES
Daily Note     Today's date: 10/7/2022  Patient name: Zach Avery  : 1955  MRN: [id number]  Referring provider: Obdulio Patel DO  Dx:   Encounter Diagnosis     ICD-10-CM    1  Other closed nondisplaced fracture of proximal end of left humerus with routine healing, subsequent encounter  S42 295D        Start Time: 945  Stop Time: 1030  Total time in clinic (min): 45 minutes    Subjective: Patient reports she has follow up with physician on 10/11  Patient reports she has some soreness, but no significant pain  Objective: See treatment diary below      Assessment: Tolerated treatment well  Added supine gentle AAROM with cane for overhead/forward flexion  Patient performed within pain-free ROM with good mobility, but limited to about 90 deg  Patient presents with slightly improved shoulder ER and ABD mobility, however continues to be limited due to pain and muscle guarding  Patient exhibited good technique with therapeutic exercises and would benefit from continued PT      Plan: Continue per plan of care  Progress treatment as tolerated         Diagnosis: L proximal humerus fracture (DOI 2022)  Precautions: no strengthening for 6 weeks (till 10/11); HTN, DM, hx of chemo, recent nephro stent (2022)  ( * astericks indicates given per HEP)    Manuals 9/21 9/23 9/26  9/29 10/4 10/7    STM DK DK SAM DK DK DK    IASTM          PROM DK DK SAM- gentle  DK DK DK              Neuro Re-Ed          scap squeezes* x10 2x10 2x10 2x10 2x10 2x10    BWD shoulder rolls  x15 x20  2x10 2x10 2x10    Shoulder iso          Pendulums* x10 x10 held HEP                          Ther Ex          Elbow flex/ext*  2x10 supine 2x10 seated 2x10 supine 2x10 supine 2x10    physioball rollouts seated  2x5 Close supervision standing 20x flex/scap with purple ball Seated green ball 2x10 Seated green ball 2x10 Seated green ball 2x10    Table slides flex and ABD (standing)   Flex 15x  Scap 10x  ea 5-10 sec hold  Flex 15x  Scap 10x ea 3-5 sec hold  Flex 2x10  Scap 2x10, 3-5 sec hold ea Flex 2x10  Scap 2x10, 3" hold ea    AAROM ER with cane     supine 2x10 supine 2x10    AAROM flexion with cane      Supine x10                        Ther Activity          Gripping  Green 30" x2 Green 30x  3" Green 30" x2 Green 30" x2 Blue 30" x2                        Pt Ed HEP, POC         Re-Evaluation          Modalities          Heat/ice (prn)  Ice x8' Ice x8' Ice x8' Ice x8' Ice x8'

## 2022-10-08 LAB — BACTERIA UR CULT: NORMAL

## 2022-10-11 ENCOUNTER — OFFICE VISIT (OUTPATIENT)
Dept: OBGYN CLINIC | Facility: MEDICAL CENTER | Age: 67
End: 2022-10-11

## 2022-10-11 ENCOUNTER — PATIENT MESSAGE (OUTPATIENT)
Dept: UROLOGY | Facility: AMBULATORY SURGERY CENTER | Age: 67
End: 2022-10-11

## 2022-10-11 ENCOUNTER — APPOINTMENT (OUTPATIENT)
Dept: LAB | Facility: CLINIC | Age: 67
End: 2022-10-11
Payer: COMMERCIAL

## 2022-10-11 ENCOUNTER — APPOINTMENT (OUTPATIENT)
Dept: RADIOLOGY | Facility: MEDICAL CENTER | Age: 67
End: 2022-10-11
Payer: COMMERCIAL

## 2022-10-11 VITALS
HEIGHT: 59 IN | SYSTOLIC BLOOD PRESSURE: 113 MMHG | BODY MASS INDEX: 25.16 KG/M2 | RESPIRATION RATE: 16 BRPM | HEART RATE: 81 BPM | WEIGHT: 124.8 LBS | DIASTOLIC BLOOD PRESSURE: 75 MMHG

## 2022-10-11 DIAGNOSIS — S42.295A OTHER CLOSED NONDISPLACED FRACTURE OF PROXIMAL END OF LEFT HUMERUS, INITIAL ENCOUNTER: Primary | ICD-10-CM

## 2022-10-11 DIAGNOSIS — N32.81 OAB (OVERACTIVE BLADDER): Primary | ICD-10-CM

## 2022-10-11 DIAGNOSIS — N14.4 NEPHROTOXICITY: Primary | ICD-10-CM

## 2022-10-11 DIAGNOSIS — S42.295D OTHER CLOSED NONDISPLACED FRACTURE OF PROXIMAL END OF LEFT HUMERUS WITH ROUTINE HEALING, SUBSEQUENT ENCOUNTER: ICD-10-CM

## 2022-10-11 LAB
CREAT 24H UR-MRATE: 0.6 G/24HR (ref 0.6–1.8)
PROT 24H UR-MCNC: 2486 MG/24 HRS (ref 40–150)
SPECIMEN VOL UR: 2200 ML
SPECIMEN VOL UR: 2200 ML

## 2022-10-11 PROCEDURE — 73030 X-RAY EXAM OF SHOULDER: CPT

## 2022-10-11 PROCEDURE — 82570 ASSAY OF URINE CREATININE: CPT

## 2022-10-11 PROCEDURE — 84156 ASSAY OF PROTEIN URINE: CPT

## 2022-10-11 PROCEDURE — 99024 POSTOP FOLLOW-UP VISIT: CPT | Performed by: ORTHOPAEDIC SURGERY

## 2022-10-11 NOTE — PROGRESS NOTES
[facility] [address] [place] [address]       Li Mcnulty  [id number]  1955    ORTHOPAEDIC SURGERY OUTPATIENT NOTE  10/11/2022      HISTORY:  74 y o  female  presents for follow-up for left proximal humerus fracture  Date of injury 08/13/2022  She is doing well with physical therapy and wearing the sling    She has no pain at rest      Past Medical History:   Diagnosis Date   • Anemia    • Chemotherapy follow-up examination    • Depression    • Endometrial cancer ([facility] [address] ) 12/2017   • History of chemotherapy     started 12/2021endometrial cancer   • Hyperglycemia     vx type 2 dm -- last assessed 4/1/14; resolved 11/7/17   • Hyperlipidemia    • Hypertension    • Obesity     last assessed 10/14/17; resolved 11/7/17       Past Surgical History:   Procedure Laterality Date   • ABDOMINAL SURGERY      GASTRIC BYPASS   • BREAST BIOPSY Right 06/28/2019    core biopsy; benign   • CHOLECYSTECTOMY      at the time of gastric bypass   • COLONOSCOPY     • CT NEEDLE BIOPSY LYMPH NODE  07/08/2019   • FL GUIDED CENTRAL VENOUS ACCESS DEVICE INSERTION  11/12/2019   • GASTRIC BYPASS     • HYSTERECTOMY Bilateral 2017    total abdominal with salpingo-oophorectomy   • IR NEPHROSTOMY TO NEPHROURETERAL STENT  06/09/2022   • IR NEPHROSTOMY TUBE CHECK/CHANGE/REPOSITION/REINSERTION/UPSIZE  05/27/2022   • IR NEPHROSTOMY TUBE PLACEMENT  07/26/2019   • IR NEPHROURETERAL STENT CHECK/CHANGE/REPOSITION  04/06/2021   • IR NEPHROURETERAL STENT CHECK/CHANGE/REPOSITION  06/04/2021   • IR NEPHROURETERAL STENT CHECK/CHANGE/REPOSITION  09/03/2021   • IR NEPHROURETERAL STENT CHECK/CHANGE/REPOSITION  12/07/2021   • IR NEPHROURETERAL STENT CHECK/CHANGE/REPOSITION  03/08/2022   • IR NEPHROURETERAL STENT CHECK/CHANGE/REPOSITION  05/10/2022   • IR NEPHROURETERAL STENT CHECK/CHANGE/REPOSITION  9/19/2022   • IR PICC LINE  09/27/2019   • IR PORT PLACEMENT  07/26/2019   • IR PORT REMOVAL  09/20/2019   • OOPHORECTOMY Bilateral 2017   • OR INSJ TUNNELED CTR VAD W/SUBQ PORT AGE 5 YR/> Left 11/12/2019    Procedure: INSERTION VENOUS PORT ( PORT-A-CATH) IR;  Surgeon: Agustin Mace DO;  Location: [facility];  Service: Interventional Radiology   • OR LAP, RADICAL HYST W/ TUBE&OV, NODE BX N/A 12/19/2017    Procedure: HYSTERECTOMY LAPAROSCOPIC TOTAL ([address]) W/ ROBOTICS; BILATERAL SALPINGOOPHERECTOMY; LYMPH NODE DISSECTION; lysis of adhesions;  Surgeon: Elmer Hays MD;  Location: [facility];  Service: Gynecology Oncology   • OR LAP,DIAGNOSTIC ABDOMEN N/A 12/19/2017    Procedure: LAPAROSCOPY DIAGNOSTIC;  Surgeon: Elmer Hays MD;  Location: [facility];  Service: Gynecology Oncology   • TONSILLECTOMY     • US GUIDED BREAST BIOPSY RIGHT COMPLETE Right 06/28/2019       Social History     Socioeconomic History   • Marital status: Single     Spouse name: Not on file   • Number of children: Not on file   • Years of education: Not on file   • Highest education level: Not on file   Occupational History   • Not on file   Tobacco Use   • Smoking status: Never Smoker   • Smokeless tobacco: Never Used   Vaping Use   • Vaping Use: Never used   Substance and Sexual Activity   • Alcohol use: Not Currently   • Drug use: No   • Sexual activity: Not Currently   Other Topics Concern   • Not on file   Social History Narrative   • Not on file     Social Determinants of Health     Financial Resource Strain: Not on file   Food Insecurity: Not on file   Transportation Needs: Not on file   Physical Activity: Not on file   Stress: Not on file   Social Connections: Not on file   Intimate Partner Violence: Not on file   Housing Stability: Not on file       Family History   Problem Relation Age of Onset   • Other Mother         dyslipidemia   • Ovarian cancer Mother 48   • Lymphoma Father    • Breast cancer Sister 61   • No Known Problems Maternal Grandmother    • Bone cancer Maternal Grandfather    • Uterine cancer Paternal Grandmother    • No Known Problems Paternal Grandfather    • No Known Problems Brother    • No Known Problems Brother    • No Known Problems Maternal Aunt    • No Known Problems Maternal Aunt    • No Known Problems Maternal Aunt    • No Known Problems Maternal Aunt    • No Known Problems Paternal Aunt    • No Known Problems Paternal Aunt    • No Known Problems Paternal Aunt    • No Known Problems Paternal Aunt         Patient's Medications   New Prescriptions    No medications on file   Previous Medications    ARIPIPRAZOLE (ABILIFY) 20 MG TABLET    Take 20 mg by mouth in the morning  ASPIRIN (ECOTRIN LOW STRENGTH) 81 MG EC TABLET    Take 81 mg by mouth daily    BD POSIFLUSH 0 9 % SOLN        CALCIUM-MAGNESIUM-VITAMIN D (CALCIUM 500 PO)    Take by mouth daily     CHOLECALCIFEROL (VITAMIN D3) 1,000 UNITS TABLET    Take 1,000 Units by mouth daily    CLOTRIMAZOLE-BETAMETHASONE (LOTRISONE) 1-0 05 % CREAM    Apply topically 2 (two) times a day    CRANBERRY-VITAMIN C 250-60 MG CAPS    Take 1 tablet by mouth in the morning    CYANOCOBALAMIN (VITAMIN B12 PO)    Take by mouth daily     DRONABINOL (MARINOL) 2 5 MG CAPSULE    Take 1 capsule (2 5 mg total) by mouth 2 (two) times a day before meals    ENOXAPARIN (LOVENOX) 80 MG/0 8 ML    Inject 0 8 mL (80 mg total) under the skin every 24 hours    ESTRADIOL (ESTRACE VAGINAL) 0 1 MG/G VAGINAL CREAM    Insert 1g nightly for 6 weeks, then 1-2x weekly  FOLIC ACID (FOLVITE) 1 MG TABLET    Take 1 tablet (1 mg total) by mouth daily    GEMFIBROZIL (LOPID) 600 MG TABLET    TAKE 1 TABLET BY MOUTH EVERY DAY    LORAZEPAM (ATIVAN) 0 5 MG TABLET    Take 1 tablet (0 5 mg total) by mouth every 6 (six) hours as needed for anxiety (or nausea)    METHYLPREDNISOLONE (MEDROL) 4 MG TABLET    Take 1 tablet (4 mg total) by mouth in the morning Take 2 tables once a day for 5 days, take 1 tablet once a day for 5 days, take 1 tablet every other day for 14 days      MULTIPLE VITAMIN (MULTIVITAMIN) TABLET    Take 1 tablet by mouth daily    NALOXONE (NARCAN) 4 MG/0 1 ML NASAL SPRAY    Administer 1 spray into a nostril  If no response after 2-3 minutes, give another dose in the other nostril using a new spray  OMEPRAZOLE (PRILOSEC) 20 MG DELAYED RELEASE CAPSULE    Take 20 mg by mouth daily    ONDANSETRON (ZOFRAN) 8 MG TABLET    Take 1 tablet (8 mg total) by mouth every 8 (eight) hours as needed for nausea or vomiting    OXYBUTYNIN (DITROPAN XL) 15 MG 24 HR TABLET    Take 1 tablet (15 mg total) by mouth daily at bedtime    RUCAPARIB (RUBRACA) 300 MG TABLET    Take 600 mg by mouth every 12 (twelve) hours Take with or without food  Do not repeat a vomited dose  SACCHAROMYCES BOULARDII (FLORASTOR) 250 MG CAPSULE    Take 1 capsule (250 mg total) by mouth 2 (two) times a day    SENNA (SENOKOT) 8 6 MG TABLET    Take 1 tablet (8 6 mg total) by mouth 2 (two) times a day as needed for constipation    SODIUM CHLORIDE, PF, 0 9 %    10 mL by Intracatheter route daily    VENLAFAXINE (EFFEXOR) 75 MG TABLET    Take 75 mg by mouth 3 (three) times a day     Modified Medications    No medications on file   Discontinued Medications    No medications on file       Allergies   Allergen Reactions   • Cephalosporins Rash     Which Cephalosporin reaction was to not specified; however, has tolerated Amoxicillin, Cefazolin, and Cefepime        /75 (BP Location: Right arm, Patient Position: Sitting)   Pulse 81   Resp 16   Ht 4' 11" (1 499 m)   Wt 56 6 kg (124 lb 12 8 oz)   LMP  (LMP Unknown)   BMI 25 21 kg/m²      REVIEW OF SYSTEMS:  Constitutional: Negative  HEENT: Negative  Respiratory: Negative  Skin: Negative  Neurological: Negative  Psychiatric/Behavioral: Negative  Musculoskeletal: Negative except for that mentioned in the HPI      /75 (BP Location: Right arm, Patient Position: Sitting)   Pulse 81   Resp 16   Ht 4' 11" (1 499 m)   Wt 56 6 kg (124 lb 12 8 oz)   LMP  (LMP Unknown)   BMI 25 21 kg/m²   Gen: No acute distress, resting comfortably in bed  HEENT: Eyes clear, moist mucus membranes, hearing intact  Respiratory: No audible wheezing or stridor  Cardiovascular: Well Perfused peripherally, 2+ distal pulse  Abdomen: nondistended, no peritoneal signs     PHYSICAL EXAM:    LEFT SHOULDER:    Appearance: normal    Forward flexion:   60 degrees active, passive 90  Abduction:  passive 30  External rotation at 0 degrees: 20  Internal rotation: sacrum     STRENGTH:  Forward flexion:  4/5   Abduction:  4/5   External rotation:  4/5   Internal rotation:  5/5          Radial/median/ulnar nerve intact    <2 sec cap refill          IMAGING:  XR left shoulder today reviewed demonstrates healing at the fracture site with stable alignment    ASSESSMENT AND PLAN:  74 y o  female  With left proximal humerus fracture, healed on XR  She is now 2 months out from her injury  She may discontinue the sling and progress with ROM and strength as tolerated at PT  She may gradually increase activity as tolerated  This may take several more weeks to months with PT to improve strength and ROM  We will see her back as needed      Scribe Attestation      I,:  Kelsey Kelly PA-C am acting as a scribe while in the presence of the attending physician :       I,:  Pita Saavedra personally performed the services described in this documentation    as scribed in my presence :

## 2022-10-12 ENCOUNTER — OFFICE VISIT (OUTPATIENT)
Dept: PHYSICAL THERAPY | Facility: CLINIC | Age: 67
End: 2022-10-12
Payer: COMMERCIAL

## 2022-10-12 ENCOUNTER — TELEPHONE (OUTPATIENT)
Dept: NEPHROLOGY | Facility: CLINIC | Age: 67
End: 2022-10-12

## 2022-10-12 DIAGNOSIS — S42.295D OTHER CLOSED NONDISPLACED FRACTURE OF PROXIMAL END OF LEFT HUMERUS WITH ROUTINE HEALING, SUBSEQUENT ENCOUNTER: Primary | ICD-10-CM

## 2022-10-12 PROCEDURE — 97140 MANUAL THERAPY 1/> REGIONS: CPT

## 2022-10-12 PROCEDURE — 97110 THERAPEUTIC EXERCISES: CPT

## 2022-10-12 PROCEDURE — 97010 HOT OR COLD PACKS THERAPY: CPT

## 2022-10-12 NOTE — PROGRESS NOTES
Daily Note     Today's date: 10/12/2022  Patient name: Juan Jo  : 1955  MRN: [id number]  Referring provider: Josee Barrett DO  Dx:   Encounter Diagnosis     ICD-10-CM    1  Other closed nondisplaced fracture of proximal end of left humerus with routine healing, subsequent encounter  S42 295D        Start Time: 0900  Stop Time: 0945  Total time in clinic (min): 45 minutes    Subjective: Patient reports she saw physician yesterday who discontinued her sling and cleared her for driving  Patient reports she has some soreness because she is using her arm more  Objective: See treatment diary below      Assessment: Tolerated treatment well  Added shoulder isometrics per MD protocol and orders for progressing strength at this time  Attempted to increased ROM potential with PROM, however patient continues to have painful end-feels at about 50% of full PROM  Progressed AAROM with p-bally slides/rollouts standing at table  Further encouraged ROM exercises to perform within tolerable, but advancing ROM to improve overall mobility and flexibility of L GHJ  Patient exhibited good technique with therapeutic exercises and would benefit from continued PT      Plan: Continue per plan of care  Progress treatment as tolerated         Diagnosis: L proximal humerus fracture (DOI 2022)  Precautions: no strengthening for 6 weeks (till 10/11); HTN, DM, hx of chemo, recent nephro stent (2022)  ( * astericks indicates given per HEP)    Manuals 9/21 9/23 9/26  9/29 10/4 10/7 10/12   STM DK DK SAM DK DK DK DK   IASTM          PROM DK DK SAM- gentle  DK DK DK DK             Neuro Re-Ed          scap squeezes* x10 2x10 2x10 2x10 2x10 2x10    BWD shoulder rolls  x15 x20  2x10 2x10 2x10    Shoulder iso (4-way)       5" x10ea   Pendulums* x10 x10 held HEP                          Ther Ex          Elbow flex/ext*  2x10 supine 2x10 seated 2x10 supine 2x10 supine 2x10 supine 2x10   physioball rollouts seated  2x5 Close supervision standing 20x flex/scap with purple ball Seated green ball 2x10 Seated green ball 2x10 Seated green ball 2x10    Table slides flex and ABD (standing)   Flex 15x  Scap 10x  ea 5-10 sec hold  Flex 15x  Scap 10x  ea 3-5 sec hold  Flex 2x10  Scap 2x10, 3-5 sec hold ea Flex 2x10  Scap 2x10, 3" hold ea With p-ball FWD, scap 2x10ea   AAROM ER with cane     supine 2x10 supine 2x10 supine 2x10   AAROM flexion with cane      Supine x10 supine 2x10                       Ther Activity          Gripping  Green 30" x2 Green 30x  3" Green 30" x2 Green 30" x2 Blue 30" x2                        Pt Ed HEP, POC         Re-Evaluation          Modalities          Heat/ice (prn)  Ice x8' Ice x8' Ice x8' Ice x8' Ice x8' Ice x8'

## 2022-10-14 ENCOUNTER — OFFICE VISIT (OUTPATIENT)
Dept: PHYSICAL THERAPY | Facility: CLINIC | Age: 67
End: 2022-10-14
Payer: COMMERCIAL

## 2022-10-14 DIAGNOSIS — S42.295D OTHER CLOSED NONDISPLACED FRACTURE OF PROXIMAL END OF LEFT HUMERUS WITH ROUTINE HEALING, SUBSEQUENT ENCOUNTER: Primary | ICD-10-CM

## 2022-10-14 PROCEDURE — 97140 MANUAL THERAPY 1/> REGIONS: CPT

## 2022-10-14 PROCEDURE — 97110 THERAPEUTIC EXERCISES: CPT

## 2022-10-14 PROCEDURE — 97010 HOT OR COLD PACKS THERAPY: CPT

## 2022-10-14 NOTE — PROGRESS NOTES
Outpatient Oncology Nutrition Consult  Type of Consult: Follow Up  Care Location: [facility]   Nutrition Assessment:  Oncology Diagnosis & Treatments:   Endometrial cancer  S/p surgery 12/19/17  Recurrence 7/8/19  S/p chemotherapy (carboplatin/taxol from 7/30/19-1/6/20)  S/p whole pelvis RT (2/4/20- 4/13/20)  Disease progression  ENDOBAR trial started 12/21/20  - Tx held 10/3/22    Oncology History   Endometrial cancer ([facility] )   11/17/2017 Initial Diagnosis    Endometrial cancer ([facility] [address] )     11/17/2017 Biopsy    ENDOMETRIAL BIOPSY: WELL DIFFERENTIATED ENDOMETRIAL ADENOCARCINOMA (FIGO I) WITH FOCALMUCINOUS FEATURES  Part B: ENDOCERVICAL POLYP:BENIGN ENDOCERVICAL      12/19/2017 Surgery    Robotic assisted total laparoscopic hysterectomy with bilateral salpingo-oophorectomy and sentinel bilateral pelvic lymph node dissection  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 (4 4 x 3 2 cm tumor, 9 4/15 4mm invasion, NO LVSI, washings revealed atypical cellular changes)     12/19/2017 Genetic Testing    Morrison testing negative     6/28/2019 Biopsy    A  Breast, Right, US BX Right Breast 1000 4 cmfn:  - Benign breast tissue with focal histiocytic aggregate  See comment   - Negative for atypia and in-situ or invasive carcinoma  7/8/2019 Recurrence    Presented with right lower extremity DVT and CT demonstrating right pelvic sidewall mass with venous, ureteral and nerve compression causing significant neuropathic pain  Biopsy:  Lymph Node, Right pelvic lymph node x3:  - High-grade adenocarcinoma  7/30/2019 - 1/6/2020 Chemotherapy    Taxol 175 mg/m2 and carboplatin AUC 6 every 21 days  Dose was reduced to taxol 135 mg/m2 and carboplatin AUC 5  Completed 6 cycles  Treatment protracted due to multiple hospital admissions       2/24/2020 - 4/13/2020 Radiation    adjuvant external beam radiation therapy to the whole pelvis to 4500 cGy followed by boost to gross disease of an additional 2200 cGy 11/23/2020 Progression    New necrotic adenopathy in the retroperitoneum on CT      12/21/2020 -  Chemotherapy    Began chemotherapy with rucaparib, atezolizumab, and bevacizumab as per [facility] clinical trial        PMH: RNYGB (2001 at [address])    Past Medical History:   Diagnosis Date   • Anemia    • Chemotherapy follow-up examination    • Depression    • Endometrial cancer ([address] ) 12/2017   • History of chemotherapy     started 12/2021endometrial cancer   • Hyperglycemia     vx type 2 dm -- last assessed 4/1/14; resolved 11/7/17   • Hyperlipidemia    • Hypertension    • Obesity     last assessed 10/14/17; resolved 11/7/17     Past Surgical History:   Procedure Laterality Date   • ABDOMINAL SURGERY      GASTRIC BYPASS   • BREAST BIOPSY Right 06/28/2019    core biopsy; benign   • CHOLECYSTECTOMY      at the time of gastric bypass   • COLONOSCOPY     • CT NEEDLE BIOPSY LYMPH NODE  07/08/2019   • FL GUIDED CENTRAL VENOUS ACCESS DEVICE INSERTION  11/12/2019   • GASTRIC BYPASS     • HYSTERECTOMY Bilateral 2017    total abdominal with salpingo-oophorectomy   • IR NEPHROSTOMY TO NEPHROURETERAL STENT  06/09/2022   • IR NEPHROSTOMY TUBE CHECK/CHANGE/REPOSITION/REINSERTION/UPSIZE  05/27/2022   • IR NEPHROSTOMY TUBE PLACEMENT  07/26/2019   • IR NEPHROURETERAL STENT CHECK/CHANGE/REPOSITION  04/06/2021   • IR NEPHROURETERAL STENT CHECK/CHANGE/REPOSITION  06/04/2021   • IR NEPHROURETERAL STENT CHECK/CHANGE/REPOSITION  09/03/2021   • IR NEPHROURETERAL STENT CHECK/CHANGE/REPOSITION  12/07/2021   • IR NEPHROURETERAL STENT CHECK/CHANGE/REPOSITION  03/08/2022   • IR NEPHROURETERAL STENT CHECK/CHANGE/REPOSITION  05/10/2022   • IR NEPHROURETERAL STENT CHECK/CHANGE/REPOSITION  9/19/2022   • IR PICC LINE  09/27/2019   • IR PORT PLACEMENT  07/26/2019   • IR PORT REMOVAL  09/20/2019   • OOPHORECTOMY Bilateral 2017   • ND INSJ TUNNELED CTR VAD W/SUBQ PORT AGE 7 YR/> Left 11/12/2019    Procedure: INSERTION VENOUS PORT ( PORT-A-CATH) IR; Surgeon: Pallavi Daniel DO;  [facility];  Service: Interventional Radiology   • AR LAP, RADICAL HYST W/ TUBE&OV, NODE BX N/A 12/19/2017    Procedure: HYSTERECTOMY LAPAROSCOPIC TOTAL ([address]) W/ ROBOTICS; BILATERAL SALPINGOOPHERECTOMY; LYMPH NODE DISSECTION; lysis of adhesions;  Surgeon: Glenn Dacosta MD;  Location: [facility];  Service: Gynecology Oncology   • AR LAP,DIAGNOSTIC ABDOMEN N/A 12/19/2017    Procedure: LAPAROSCOPY DIAGNOSTIC;  Surgeon: Glenn Dacosta MD;  Location: [facility];  Service: Gynecology Oncology   • TONSILLECTOMY     • US GUIDED BREAST BIOPSY RIGHT COMPLETE Right 06/28/2019       Review of Medications:   Vitamins, Supplements and Herbals: yes: Hx RNYGB regimen: Vitamin D, Folic Acid, Align, calcium citrate TID, B12 1000 mcg QD, MVI BID (MVI does not have iron);  Iron 65 mg QD (2 hrs seperate from calcium)    Current Outpatient Medications:   •  ARIPiprazole (ABILIFY) 20 MG tablet, Take 20 mg by mouth in the morning , Disp: , Rfl:   •  aspirin (ECOTRIN LOW STRENGTH) 81 mg EC tablet, Take 81 mg by mouth daily, Disp: , Rfl:   •  BD PosiFlush 0 9 % SOLN, , Disp: , Rfl:   •  Calcium-Magnesium-Vitamin D (CALCIUM 500 PO), Take by mouth daily , Disp: , Rfl:   •  cholecalciferol (VITAMIN D3) 1,000 units tablet, Take 1,000 Units by mouth daily, Disp: , Rfl:   •  clotrimazole-betamethasone (LOTRISONE) 1-0 05 % cream, Apply topically 2 (two) times a day (Patient not taking: No sig reported), Disp: 30 g, Rfl: 0  •  Cranberry-Vitamin C 250-60 MG CAPS, Take 1 tablet by mouth in the morning, Disp: 90 capsule, Rfl: 3  •  Cyanocobalamin (VITAMIN B12 PO), Take by mouth daily , Disp: , Rfl:   •  dronabinol (MARINOL) 2 5 mg capsule, Take 1 capsule (2 5 mg total) by mouth 2 (two) times a day before meals, Disp: 30 capsule, Rfl: 0  •  enoxaparin (LOVENOX) 80 mg/0 8 mL, Inject 0 8 mL (80 mg total) under the skin every 24 hours, Disp: 72 mL, Rfl: 1  •  estradiol (ESTRACE VAGINAL) 0 1 mg/g vaginal cream, Insert 1g nightly for 6 weeks, then 1-2x weekly  , Disp: 42 5 g, Rfl: 1  •  folic acid (FOLVITE) 1 mg tablet, Take 1 tablet (1 mg total) by mouth daily, Disp: , Rfl: 0  •  gemfibrozil (LOPID) 600 mg tablet, TAKE 1 TABLET BY MOUTH EVERY DAY, Disp: 90 tablet, Rfl: 0  •  LORazepam (ATIVAN) 0 5 mg tablet, Take 1 tablet (0 5 mg total) by mouth every 6 (six) hours as needed for anxiety (or nausea), Disp: 36 tablet, Rfl: 1  •  methylprednisolone (MEDROL) 4 mg tablet, Take 1 tablet (4 mg total) by mouth in the morning Take 2 tables once a day for 5 days, take 1 tablet once a day for 5 days, take 1 tablet every other day for 14 days  , Disp: 21 tablet, Rfl: 0  •  Multiple Vitamin (MULTIVITAMIN) tablet, Take 1 tablet by mouth daily, Disp: , Rfl:   •  naloxone (NARCAN) 4 mg/0 1 mL nasal spray, Administer 1 spray into a nostril  If no response after 2-3 minutes, give another dose in the other nostril using a new spray , Disp: 1 each, Rfl: 0  •  omeprazole (PriLOSEC) 20 mg delayed release capsule, Take 20 mg by mouth daily, Disp: , Rfl:   •  ondansetron (ZOFRAN) 8 mg tablet, Take 1 tablet (8 mg total) by mouth every 8 (eight) hours as needed for nausea or vomiting, Disp: 30 tablet, Rfl: 1  •  oxybutynin (DITROPAN XL) 15 MG 24 hr tablet, Take 1 tablet (15 mg total) by mouth daily at bedtime, Disp: 90 tablet, Rfl: 3  •  rucaparib (RUBRACA) 300 mg tablet, Take 600 mg by mouth every 12 (twelve) hours Take with or without food    Do not repeat a vomited dose , Disp: , Rfl:   •  saccharomyces boulardii (FLORASTOR) 250 mg capsule, Take 1 capsule (250 mg total) by mouth 2 (two) times a day, Disp: , Rfl: 0  •  senna (SENOKOT) 8 6 MG tablet, Take 1 tablet (8 6 mg total) by mouth 2 (two) times a day as needed for constipation, Disp: 60 tablet, Rfl: 0  •  sodium chloride, PF, 0 9 %, 10 mL by Intracatheter route daily, Disp: 300 mL, Rfl: 3  •  venlafaxine (EFFEXOR) 75 mg tablet, Take 75 mg by mouth 3 (three) times a day  , Disp: , Rfl:   • Vibegron 75 MG TABS, Take 75 mg by mouth in the morning, Disp: 30 tablet, Rfl: 3  No current facility-administered medications for this visit      Facility-Administered Medications Ordered in Other Visits:   •  INV atezolizumab (INVESTIGATIONAL) 1,200 mg in sodium chloride 0 9 % 250 mL infusion, 1,200 mg, Intravenous, Once, Merribeth Baumgarten, MD, 1,200 mg at 10/24/22 0930  •  sodium chloride 0 9 % infusion, 20 mL/hr, Intravenous, Continuous, Merribeth Baumgarten, MD, Last Rate: 20 mL/hr at 10/24/22 0927, 20 mL/hr at 10/24/22 0927    Most Recent Lab Results:  Lab Results   Component Value Date    WBC 6 41 10/21/2022    IRON 92 11/19/2021    TIBC 374 11/19/2021    FERRITIN 1,429 (H) 11/19/2021    CHOLESTEROL 171 10/21/2022    CHOL 183 10/27/2017    TRIG 87 04/15/2022    HDL 72 04/15/2022    LDLCALC 100 04/15/2022    ALT 8 10/21/2022    AST 10 (L) 10/21/2022    ALB 4 1 10/21/2022     10/27/2017     05/12/2017    SODIUM 140 10/21/2022    SODIUM 138 10/01/2022    K 3 2 (L) 10/21/2022    K 4 1 10/01/2022     10/21/2022    BUN 24 10/21/2022    BUN 16 10/01/2022    CREATININE 1 05 10/21/2022    CREATININE 1 07 10/01/2022    EGFR 55 10/21/2022    PHOS 3 5 10/21/2022    PHOS 3 6 10/01/2022    GLUCOSE 219 (H) 12/19/2017    POCGLU 97 08/18/2020    GLUF 173 (H) 10/21/2022    GLUF 117 (H) 10/01/2022    GLUC 173 (H) 10/21/2022    HGBA1C 4 8 07/07/2021    HGBA1C 5 1 02/03/2020    HGBA1C 6 2 08/28/2019    CALCIUM 9 3 10/21/2022    FOLATE 5 4 10/06/2019    MG 1 8 (L) 10/21/2022     Anthropometric Measurements:   Height: 59"  Ht Readings from Last 1 Encounters:   10/21/22 4' 11" (1 499 m)     Wt Readings from Last 20 Encounters:   10/21/22 56 5 kg (124 lb 8 oz)   10/11/22 56 6 kg (124 lb 12 8 oz)   10/03/22 55 3 kg (122 lb)   09/19/22 57 6 kg (127 lb)   09/09/22 57 4 kg (126 lb 8 7 oz)   09/09/22 56 7 kg (125 lb)   09/07/22 57 6 kg (127 lb)   09/06/22 57 6 kg (127 lb)   08/30/22 58 1 kg (128 lb)   08/23/22 57 6 kg (127 lb) 08/22/22 57 8 kg (127 lb 8 oz)   08/19/22 57 6 kg (127 lb)   08/18/22 57 2 kg (126 lb)   08/15/22 57 2 kg (126 lb)   08/02/22 57 4 kg (126 lb 8 7 oz)   08/01/22 58 1 kg (128 lb)   07/29/22 57 2 kg (126 lb)   07/27/22 55 3 kg (122 lb)   07/21/22 55 3 kg (122 lb)   07/12/22 54 kg (119 lb)     Weight Hx:  Usual Weight: 270# before RNYGB in 2001; lowest post-op wt: 200#; wt typically fluctuates between 215-230# (prior to wt loss)  Varian: (2/25/20) 185 6#, (3/3/20) 188 6#, (3/10/20) 187 2#, (3/19/20) 186 2#, (3/24/20) 187#, (3/31/20) 185 4#, (4/10/20) 184 4#    Home Scale Wts: (2/19/20) 185#, (8/3/20) 194#    Oncology Nutrition-Anthropometrics    Flowsheet Row Nutrition from 10/24/2022 in [facility] Nutrition from 8/22/2022 in [facility]   Patient [age] (years): 74 years 61 years   Patient (female) height (in): 61 in 61 in   Current Weight to be used for anthropometric calculations (lbs) 124 5 lbs 127 5 lbs   Current Weight to be used for anthropometric calculations (kg) 56 6 kg 58 kg   BMI: 25 1 25 7   IBW female: 95 lbs 95 lbs   IBW (kg) female: 43 2 kg 43 2 kg   IBW % (female) 131 1 % 134 2 %   Adjusted BW (female): 102 4 lbs 103 1 lbs   Adjusted BW kg (female): 46 5 kg 46 9 kg   % weight change after 1 week: -0 2 % 1 2 %   Weight change after 1 week (lbs) -0 3 lbs 1 5 lbs   % weight change after 1 month: -2 % 4 5 %   Weight change after 1 month (lbs) -2 5 lbs 5 5 lbs   % weight change after 3 months: 2 % 5 4 %   Weight change after 3 months (lbs) 2 5 lbs 6 5 lbs        Nutrition-Focused Physical Findings:  mild  muscle depletion ([place]) - hollowing/scooping/depression and mild  body fat depletion ([place]) - hollowing/depression/dark circles    Food/Nutrition-Related History & Client/Social History:    Current Nutrition Impact Symptoms:   [] Nausea  [] Reduced Appetite  [] Acid Reflux   [] Vomiting  [x] Unintended Wt Loss - recent hx  -pt does not want to regain the wt that she lost [x] Malabsorption - S/p RNYGB in 2001   [] Diarrhea  [] Unintended Wt Gain  [] Dumping Syndrome   [] Constipation - denies  -On bowel protocol [] Thick Mucous/Secretions  [] Abdominal Pain    [] Dysgeusia (Altered Taste) [x] Xerostomia (Dry Mouth) - comes and goes  -uses bs/sw rinses and Biotene [] Gas    [] Dysosmia (Altered Smell)  [] Thrush  [] Difficulty Chewing    [x] Oral Mucositis (Sore Mouth) - mouth sores, still on steroids  -using biotene products + bs/sw rinses  -tries to avoid acidic foods and sharp/hard/crunchy foods [] Fatigue  [] Pain:   [] Odynophagia   [] Esophagitis  [] Other:    [] Dysphagia [] Early Satiety  [] No Problems Eating      Food Allergies & Intolerances: yes: had skin patch testing which showed allergy to strawberries, but says she is able to eat strawberries without any side effects    Current Diet: Small Frequent Meals  Current Nutrition Intake: Unchanged from last visit   Appetite: Good   Nutrition Route: PO   Oral Care: Biotene toothpaste, rinse and spray, bs/sw rinses  Activity level: Sedentary  Limited by pain and medical condition  Currently in PT d/t arm injury       24 Hr Diet Recall: has been cooking more recently  Breakfast: Eggo chocolate chip waffle with butter  Snack: 10:30am - went to breakfast and had 2 eggs with 1 slice toast with butter  Lunch: hot dog, pc of bread, mustard, ketchup  Snack: chocolate ice cream cone  Dinner: 6 oz steak (ate 100%), sweet potato (ate 50%)  Snack: another hot dog with a pc of bread, mustard, ketchup  Snack: caramel apple cut into slices    Beverages: water (16 9 oz x2), sweetened iced tea (32 oz x1 + 24 oz x1)   -Does not drink coffee or alcohol   -Does not separate fluids from solids, does not have discomfort while eating and drinking at the same time  Supplements:   Premier Protein Shake (11 oz, 160 kcal, 30 g pro) - none lately   Unjury protein powder - occasionally adds to foods such as soup    Oncology Nutrition-Estimated Needs    Flowsheet Row Nutrition from 3/28/2022 in [place] [address] [facility] Nutrition from 10/11/2021 in [place] [address] On[facility]   Weight type used Actual weight Adjusted weight   Weight in kilograms (kg) used for estimated needs 54 1 kg 49 5 kg   Energy needs formula:  35-40 kcal/kg 35-40 kcal/kg   Energy needs based on 35 kcal/k kcal 1733 kcal   Energy needs based on 40 kcal/k kcal 1980 kcal   Protein needs formula: 1 5-2 g/kg 1 5-2 g/kg   Protein needs based on 1 5 g/kg 81 g 74 g   Protein needs based on 2 g/kg 108 g 99 g   Fluid needs formula: 30-35 mL/kg 30-35 mL/kg   Fluid needs based on 30 mL/kg 1623 mL 1485 mL   Fluid needs in ounces 55 oz 50 oz   Fluid needs based on 35 mL/kg 1894 mL 1733 mL   Fluid needs in ounces 64 oz 59 oz        Discussion & Intervention:    Ragini was evaluated today for an RD follow up regarding wt loss and pt request for dietitian involvement throughout tx  Ragini is currently undergoing tx for endometrial cancer  Ragini is doing well today s/p a recent tx break  She has lost a little bit of wt since last visit but she is now stabilizing/gaining some back  She has a good appetite and has been eating well  She says she has been cooking more and wants to try some new recipes  We discussed a plan for reduced appetite/wt loss now that tx has resumed  Reviewed 24 hour recall, which revealed an adequate po intake, and discussed ways to increase kcal, protein, and fluid intakes and optimize nutrient intake  Also reviewed the importance of wt management throughout the tx process and the role of a high kcal/ high protein diet in managing wt and overall health    Based on today's assessment, discussion included: MNT for: hx wt loss, mouth sores, a high kcal/protein diet & food choices to include at all meals & snacks (Examples of high kcal foods: cheese, full-fat dairy products, nut butter, plant-based fats, coconut oil/milk, avocado, butter, cream soup, etc  Examples of high protein foods: eggs, chicken, fish, beans/legumes, nuts/nut butters, bone broth, etc ) , adequate hydration & fluid choices, utilizing oral nutrition supplements and recipe suggestions/resources, trying new recipes, & cooking at home more often  Moving forward, Ragini was encouraged to increase kcal, protein, and fluid intakes  She does best with self-directed goals and has chosen goals for herself moving forward; RD guidance provided during goal setting  Materials Provided: not applicable  All questions and concerns addressed during today’s visit  Ragini has RD contact information  Nutrition Diagnosis:  Increased Nutrient Needs (kcal & pro) related to increased demand for nutrients and disease state as evidenced by cancer dx and pt undergoing tx for cancer  Monitoring & Evaluation:   Goals:  weight maintenance/stabilization  adequate nutrition impact symptom management  pt to meet >/=75% estimated nutrition needs daily     Progress Towards Goals: Progressing    Nutrition Rx & Recommendations:  Diet: High Calorie, High Protein (for high calorie foods see pages 52-53, and for high protein foods see pages 49-51 in your Eating Hints book)  Small, frequent meals/snacks may be easier to tolerate than 3 large daily meals  Aim for 5-6 small meals per day (every 2-3 hours)  Include protein at all meals/snacks  Follow proper oral care; Try baking soda/salt water rinse recipe (mix 3/4 tsp salt + 1 tsp baking soda + 1 qt water; rinse with plain water after using) in Eating Hints book (pg 18)  Brush your teeth before/after meals & before bed    For sore mouth/throat: choose foods that are easy to chew/swallow; cook foods until they are soft/tender; moisten & soften foods (gravy/sauce/broth/yogurt); cut food into small pieces; drink with a straw (as tolerated); eat with a very small spoon; eat cold or room temperature food; suck on ice chips; Avoid citrus, spicy foods, tomatoes/ketchup, salty foods, raw vegetables, sharp/crunchy/hard foods & alcohol (see pages 23-28 in your Eating Hints book)  For dry mouth: sip water throughout the day; try very sweet (or tart, as tolerated) drinks; chew gum or suck on hard candy, popsicles, & ice chips; eat easy to swallow foods (such as pureed cooked foods or soups); moisten food with sauce/gravy/salad dressing; do not drink beer, wine, or any type of alcohol; keep lips moist with lip balm; avoid tobacco products & second-hand smoke; try Biotene as needed (see pages 17-18 in your Eating Hints book)  Follow proper oral care; Try baking soda/salt water rinse recipe (mix 3/4 tsp salt + 1 tsp baking soda + 1 qt water; rinse with pain water after using) in Eating Hints book (pg 18)  Brush your teeth before/after meals & before bed  Weigh yourself regularly  If you notice weight loss, make an effort to increase your daily food/calorie intake  If you continue to notice loss after these efforts, reach out to your dietitian to establish a plan to stabilize weight     Snack ideas: [place] yogurt with fruit, pudding, veggies with humus OR try greek yogurt based vegetable dip, peanut butter, grapes and cheese, fruit with a cheese stick , cottage cheese, soup, unjury, protein shakes/smoothies  Continue to keep a food journal as long as it is helpful    Patient choosen goals:  Resume Premier shake daily as needed for weight loss  Increase vegetable intake  Try new, simple recipes;  Try crockpot recipes    Follow Up Plan: 12/5 during infusion   Recommend Referral to Other Providers: none at this time

## 2022-10-14 NOTE — PROGRESS NOTES
Tolerated procedure without incident: No adverse reactions noted: Verified follow up appt with patient: AVS offered and declined No

## 2022-10-14 NOTE — PROGRESS NOTES
Daily Note     Today's date: 10/14/2022  Patient name: Zach Avery  : 1955  MRN: [id number]  Referring provider: Obdulio Patel DO  Dx:   Encounter Diagnosis     ICD-10-CM    1  Other closed nondisplaced fracture of proximal end of left humerus with routine healing, subsequent encounter  S42 295D        Start Time: 830  Stop Time: 915  Total time in clinic (min): 45 minutes    Subjective: Patient reports she was very sore after last session  Patient reports used ice pack at home that helped to relieve soreness  Patient reports she has also been using her Left arm a lot more at home  Objective: See treatment diary below      Assessment: Tolerated treatment well  Paitent with improved tolerance noted with shoulder isometrics, however cuing required for maintaining proper form and muscle activation  Improving mobility and tolerance to manual stretching/PROM this visit, however continues to be limited due to pain  Educated patient to continue HEP, especially postural exercises  Also educated patient to continue using cold pack for Left shoulder 2-3x/day if soreness persists  Patient exhibited good technique with therapeutic exercises and would benefit from continued PT      Plan: Continue per plan of care  Progress treatment as tolerated         Diagnosis: L proximal humerus fracture (DOI 2022)  Precautions: no strengthening for 6 weeks (till 10/11); HTN, DM, hx of chemo, recent nephro stent (2022)  ( * astericks indicates given per HEP)    Manuals 9/21 9/23 9/26  9/29 10/4 10/7 10/12 10/14   STM DK DK SAM DK DK DK DK DK   IASTM           PROM DK DK SAM- gentle  DK DK DK DK DK              Neuro Re-Ed           scap squeezes* x10 2x10 2x10 2x10 2x10 2x10     BWD shoulder rolls  x15 x20  2x10 2x10 2x10     Shoulder iso (4-way)       5" x10ea 5" x10ea   Pendulums* x10 x10 held HEP                             Ther Ex           Elbow flex/ext*  2x10 supine 2x10 seated 2x10 supine 2x10 supine 2x10 supine 2x10 Supine #1 2x10   physioball rollouts seated  2x5 Close supervision standing 20x flex/scap with purple ball Seated green ball 2x10 Seated green ball 2x10 Seated green ball 2x10     Table slides flex and ABD (standing)   Flex 15x  Scap 10x  ea 5-10 sec hold  Flex 15x  Scap 10x  ea 3-5 sec hold  Flex 2x10  Scap 2x10, 3-5 sec hold ea Flex 2x10  Scap 2x10, 3" hold ea With p-ball FWD, scap 2x10ea With p-ball FWD, scap 2x10ea   AAROM ER with cane     supine 2x10 supine 2x10 supine 2x10 supine 2x10   AAROM flexion with cane      Supine x10 supine 2x10 supine 2x10                         Ther Activity           Gripping  Green 30" x2 Green 30x  3" Green 30" x2 Green 30" x2 Blue 30" x2                           Pt Ed HEP, POC          Re-Evaluation           Modalities           Heat/ice (prn)  Ice x8' Ice x8' Ice x8' Ice x8' Ice x8' Ice x8' Ice x8'

## 2022-10-15 ENCOUNTER — TELEPHONE (OUTPATIENT)
Dept: UROLOGY | Facility: MEDICAL CENTER | Age: 67
End: 2022-10-15

## 2022-10-15 NOTE — TELEPHONE ENCOUNTER
Drug Prior Authorization for Gemtesa 75mg was APPROVED (Auth#:  [id number]) and valid from 10/15/22 until 12/31/23  Pharmacy notified of same

## 2022-10-15 NOTE — TELEPHONE ENCOUNTER
Prior Authorization for Gemtesa 75mg was requested and initiated via CoverMyMeds  com - (Key: [id number]) Rx #: [id number]  Response questions answered and submitted for consideration  Final determination is expected within 24-72 hours

## 2022-10-17 ENCOUNTER — OFFICE VISIT (OUTPATIENT)
Dept: PHYSICAL THERAPY | Facility: CLINIC | Age: 67
End: 2022-10-17
Payer: COMMERCIAL

## 2022-10-17 DIAGNOSIS — S42.295D OTHER CLOSED NONDISPLACED FRACTURE OF PROXIMAL END OF LEFT HUMERUS WITH ROUTINE HEALING, SUBSEQUENT ENCOUNTER: Primary | ICD-10-CM

## 2022-10-17 PROCEDURE — 97140 MANUAL THERAPY 1/> REGIONS: CPT

## 2022-10-17 PROCEDURE — 97010 HOT OR COLD PACKS THERAPY: CPT

## 2022-10-17 PROCEDURE — 97110 THERAPEUTIC EXERCISES: CPT

## 2022-10-17 NOTE — PROGRESS NOTES
Daily Note     Today's date: 10/17/2022  Patient name: Pam Branch  : 1955  MRN: [id number]  Referring provider: Ganesh Odonnell DO  Dx:   Encounter Diagnosis     ICD-10-CM    1  Other closed nondisplaced fracture of proximal end of left humerus with routine healing, subsequent encounter  S42 295D        Start Time: 1230  Stop Time: 1315  Total time in clinic (min): 45 minutes    Subjective: Patient reports her shoulder is sore because she has been using her arm more  Patient reports she has been putting ice pack every night for 15 min that has been helping  Patient reports pain is not severe and rates 5/10 at worst        Objective: See treatment diary below      Assessment: Tolerated treatment well  Patient with slightly improved L shoulder PROM  Patient also noted to have improving supine flexion AAROM with wand  Added standing wall slides to improve overhead reaching and forward flexion mobility  Patient performed with good tolerance and no increased pain  Cuing required for proper form and muscle activation with shoulder isometrics  Educated patient about activation modifications at home to avoid significant exacerbation of pain or soreness, to continue using arm for ADLs and other tasks, but to modify as needed when increased pain or soreness is felt  Patient exhibited good technique with therapeutic exercises and would benefit from continued PT      Plan: Continue per plan of care  Progress note during next visit        Diagnosis: L proximal humerus fracture (DOI 2022)  Precautions: no strengthening for 6 weeks (till 10/11); HTN, DM, hx of chemo, recent nephro stent (2022)  ( * astericks indicates given per HEP)    Manuals 9/21 9/23 9/26  9/29 10/4 10/7 10/12 10/14 10/17   STM DK DK SAM DK DK DK DK DK DK   IASTM            PROM DK DK SAM- gentle  DK DK DK DK DK DK               Neuro Re-Ed            scap squeezes* x10 2x10 2x10 2x10 2x10 2x10      BWD shoulder rolls  x15 x20  2x10 2x10 2x10      Shoulder iso (4-way)       5" x10ea 5" x10ea 5" x10ea   Pendulums* x10 x10 held HEP                                Ther Ex            Elbow flex/ext*  2x10 supine 2x10 seated 2x10 supine 2x10 supine 2x10 supine 2x10 Supine #1 2x10 Supine #1 2x10   physioball rollouts seated  2x5 Close supervision standing 20x flex/scap with purple ball Seated green ball 2x10 Seated green ball 2x10 Seated green ball 2x10      Table slides flex and ABD (standing)   Flex 15x  Scap 10x  ea 5-10 sec hold  Flex 15x  Scap 10x  ea 3-5 sec hold  Flex 2x10  Scap 2x10, 3-5 sec hold ea Flex 2x10  Scap 2x10, 3" hold ea With p-ball FWD, scap 2x10ea With p-ball FWD, scap 2x10ea With p-ball FWD, scap 2x10ea   AAROM ER with cane     supine 2x10 supine 2x10 supine 2x10 supine 2x10 supine 2x10   AAROM flexion with cane      Supine x10 supine 2x10 supine 2x10 supine 2x10   Wall slides with towel         Flex x10               Ther Activity            Gripping  Green 30" x2 Green 30x  3" Green 30" x2 Green 30" x2 Blue 30" x2                              Pt Ed HEP, POC           Re-Evaluation            Modalities            Heat/ice (prn)  Ice x8' Ice x8' Ice x8' Ice x8' Ice x8' Ice x8' Ice x8' Ice x8'

## 2022-10-18 ENCOUNTER — HOSPITAL ENCOUNTER (OUTPATIENT)
Dept: CT IMAGING | Facility: HOSPITAL | Age: 67
Discharge: HOME | End: 2022-10-18
Attending: OBSTETRICS & GYNECOLOGY
Payer: COMMERCIAL

## 2022-10-18 DIAGNOSIS — C54.1 ENDOMETRIAL CANCER (HCC): ICD-10-CM

## 2022-10-18 PROCEDURE — 74177 CT ABD & PELVIS W/CONTRAST: CPT

## 2022-10-18 PROCEDURE — 71260 CT THORAX DX C+: CPT

## 2022-10-18 PROCEDURE — G1004 CDSM NDSC: HCPCS

## 2022-10-18 RX ADMIN — IOHEXOL 80 ML: 300 INJECTION, SOLUTION INTRAVENOUS at 07:46

## 2022-10-19 ENCOUNTER — EVALUATION (OUTPATIENT)
Dept: PHYSICAL THERAPY | Facility: CLINIC | Age: 67
End: 2022-10-19
Payer: COMMERCIAL

## 2022-10-19 DIAGNOSIS — C54.1 ENDOMETRIAL CANCER (HCC): Primary | ICD-10-CM

## 2022-10-19 DIAGNOSIS — S42.295D OTHER CLOSED NONDISPLACED FRACTURE OF PROXIMAL END OF LEFT HUMERUS WITH ROUTINE HEALING, SUBSEQUENT ENCOUNTER: Primary | ICD-10-CM

## 2022-10-19 PROCEDURE — 97530 THERAPEUTIC ACTIVITIES: CPT

## 2022-10-19 PROCEDURE — 97140 MANUAL THERAPY 1/> REGIONS: CPT

## 2022-10-19 PROCEDURE — 97112 NEUROMUSCULAR REEDUCATION: CPT

## 2022-10-19 PROCEDURE — 97010 HOT OR COLD PACKS THERAPY: CPT

## 2022-10-19 NOTE — PROGRESS NOTES
PT Re-Evaluation     Today's date: 10/19/2022  Patient name: Jase Power  : 1955  MRN: [id number]  Referring provider: Katrina Rodriguez DO  Dx:   Encounter Diagnosis     ICD-10-CM    1  Other closed nondisplaced fracture of proximal end of left humerus with routine healing, subsequent encounter  S42 295D        Start Time: 0915  Stop Time: 1000  Total time in clinic (min): 45 minutes    Assessment  Assessment details: Patient is a 74 y o  female who presents to outpatient PT IE s/p fall on 2022 with Left proximal humeral fracture  Patient is about 2 months post fracture at this time  Patient has attended PT for 9 visits thus far  Patient presents to PT re-evaluation without sling, reporting improved pain levels since IE  Patient also reports improved mobility of Left shoulder, especially with overhead reaching and other motions  Ptaient noted to have improved AROM and PROM of Left shoulder in all directions  Patient continues to have difficulty with reaching behind her head and back, which affects her ability to perform UE dressing (shirt, bra, etc) and bathing activities  Patient reports no difficulty with LE dressing  Recently initiated light strength training in PT, starting with isometrics and slowly progressing into concentric/eccentric training  Also progressing ROM exercises in PT to further improve overhead and lateral reaching ability  Patient reports she has begun driving all distances and reports no difficulty at this time  Patient however reports pain and difficulty with opening her car door with Left hand or reaching out for picking up food from drive-thru with Left hand  Patient has reported reduced Left shoulder pain while sleeping  Patient is Right-handed and is thus is able to assist and perform most activities with use of Right hand   Patient continues to have difficulty perform certain activities that require use of both hands or just Left hand (ie: lifting, certain household chores, and self-care activities)  Patient otherwise reports she is independent with ADLs and self-care tasks, however lives with her mother who assists her in certain other tasks and household chores  Patient would benefit from continued skilled PT services to further improve her Left shoulder mobility, improve UE strength, improve postural mechanics, reduce pain levels, and progress overall functional mobility in order to perform ADLs and self-care tasks more efficiently and easily and to overall maximize function  Thank you for the referral     Impairments: abnormal muscle firing, abnormal or restricted ROM, abnormal movement, activity intolerance, impaired physical strength, lacks appropriate home exercise program, pain with function, scapular dyskinesis, poor posture  and poor body mechanics  Functional limitations: reaching overhead/laterally, lifting, opening car doors, UE dressingBarriers to therapy: Undergoing chemo, nephro stents, extensive medical hx  Understanding of Dx/Px/POC: good   Prognosis: good    Goals  Impairment Goals 4-6 weeks  In order to improve and maximize function patient will be able to     - Decrease pain intensity to <3/10  Ongoing  - Improve shoulder PROM to [facility] without pain throughout to improve flexibility of GHJ  - Improve shoulder AROM to [facility] without pain throughout to improve mobility of shoulder for varying reaching tasks    Functional Goals 6-8 weeks  In order to improve and maximize function patient will be able to     - Return to Prior Level of Function with no greater than 2/10 pain   - Increase Functional Status Measure (FOTO) to > = expected at Discharge  Improving  - Be independent and compliant with HEP  Partially Met  - Have the ROM necessary for performing dressing/bathing activities without difficulty  Improved, difficulty with putting shirt or bra on  - Sleep throughout the night without disturbances due to pain   Met  - Improve ability to lift/carry light objects overhead for better ease with household chores and ADLs  New, 10/19/2022  - Improve ability to open car door without difficulty or pain to improve ease with getting into car or opening other doors  New, 10/19/2022      Plan  Patient would benefit from: skilled PT  Planned modality interventions: cryotherapy  Other planned modality interventions: moist heat  Planned therapy interventions: joint mobilization, manual therapy, neuromuscular re-education, patient education, strengthening, stretching, therapeutic activities, therapeutic exercise, home exercise program, functional ROM exercises, postural training, body mechanics training, flexibility, Paz taping and massage  Frequency: 1-2x week  Duration in weeks: 4  Treatment plan discussed with: patient        Subjective Evaluation    History of Present Illness  Date of onset: 8/13/2022  Mechanism of injury: trauma  Mechanism of injury: Sammy Estrada is a 74y o  year-old female who presents to outpatient PT s/p Left proximal humeral fracture that occurred on 8/13/2022  Patient reports she was home, she was wearing flipflops when her flipflop got stuck to a door  Patient reports she fell forward and onto Left shoulder  Patient reports she was able to get up by herself, and then called her sister  Patient did not want to go to ER, however was convinced by her sister to go on 8/15/2022  Patient received x-ray at the time that revealed proximal humeral fracture (see full report for details)  Patient was given sling and sent home  She had follow up on 8/30/2022 and received follow up x-rays again that stated fracture was healing well  Patient continues to be in sling at this time, with precautions for no strengthening for 6 weeks  Patient has follow up MD appointment on 10/4/2022  Patient is independent with most ADLs and self-care tasks, but requires assistance with putting her sling on  Patient is not driving at this time    Quality of life: good    Pain  Current pain rating: 3  At best pain ratin  At worst pain ratin  Location: L shoulder  Quality: dull ache  Relieving factors: medications and support  Aggravating factors: overhead activity and lifting  Progression: improved    Social Support  Lives with: mother  Hand dominance: right      Diagnostic Tests  X-ray: abnormal (8/15, : L shoulder)  Treatments  Previous treatment: immobilization  Current treatment: physical therapy  Patient Goals  Patient goals for therapy: decreased pain, increased motion, independence with ADLs/IADLs, return to sport/leisure activities and increased strength          Objective     Postural Observations  Seated posture: good  Standing posture: good    Additional Postural Observation Details  Left shoulder slightly guarded, slightly rounded/kyphotic thoracic posture    Palpation   Left   Muscle spasm in the anterior deltoid, middle deltoid and triceps  Tenderness of the anterior deltoid, middle deltoid and triceps  Tenderness     Left Shoulder   Tenderness in the [facility] [place] joint, biceps tendon (proximal) and bicipital groove  Active Range of Motion   Left Shoulder   Flexion: 85 degrees   Extension: 60 degrees   Abduction: 55 degrees   External rotation 0°: 52 degrees   External rotation BTH: C7 (suboccipital)   Internal rotation BTB: sacrum (glut)     Right Shoulder   Normal active range of motion    Passive Range of Motion   Left Shoulder   Flexion: 105 degrees   Abduction: 60 degrees   External rotation 0°: 65 degrees     Right Shoulder   Normal passive range of motion    Scapular Mobility   Left Shoulder   Scapular mobility: fair    Additional Scapular Mobility Details  Passive mobility with significant stiffness in distraction, upward and downward rotations  Joint Play   Left Shoulder  Hypomobile in the anterior capsule, posterior capsule and inferior capsule      Strength/Myotome Testing     Left Shoulder     Planes of Motion   Flexion: 3   Abduction: 3+   External rotation at 0°: 3+   Internal rotation at 0°: 3+     Isolated Muscles   Biceps: 4   Triceps: 3+     Right Shoulder   Normal muscle strength    Additional Strength Details  L shoulder MMT assessed at available AROM   Strength (Lv II in neutral): Left 20, 25, 20 lbs; Right 25, 20, 18 lbs       Subjective: Patient reports yesterday she had a good day with 0/10 pain  She reports this morning she has some soreness in her shoulder 3/10  She reports overall her motion of L shoulder has improved and feels PT has been helping her with reaching, dressing/bathing, and other activities at home  Patient reports she has started driving and reports no issues except for difficulty with opening car door          Diagnosis: L proximal humerus fracture (DOI 8/13/2022)  Precautions: HTN, DM, hx of chemo, recent nephro stent (9/19/2022)  ( * astericks indicates given per HEP)    Manuals 10/4 10/7 10/12 10/14 10/17 10/19   STM DK DK DK DK DK DK   IASTM         PROM DK DK DK DK DK DK            Neuro Re-Ed         scap squeezes* 2x10 2x10       BWD shoulder rolls 2x10 2x10       Shoulder iso (4-way)   5" x10ea 5" x10ea 5" x10ea 5" x10ea   Pendulums*         Mid Rows      NV   Shoulder Ext      NV            Ther Ex         Elbow flex/ext* supine 2x10 supine 2x10 supine 2x10 Supine #1 2x10 Supine #1 2x10    physioball rollouts seated Seated green ball 2x10 Seated green ball 2x10       Table slides flex and ABD (standing) Flex 2x10  Scap 2x10, 3-5 sec hold ea Flex 2x10  Scap 2x10, 3" hold ea With p-ball FWD, scap 2x10ea With p-ball FWD, scap 2x10ea With p-ball FWD, scap 2x10ea    AAROM ER with cane supine 2x10 supine 2x10 supine 2x10 supine 2x10 supine 2x10 Seated 2x10   AAROM flexion with cane  Supine x10 supine 2x10 supine 2x10 supine 2x10 supine 2x10   Wall slides with towel     Flex x10 Flex x10            Ther Activity         Gripping Green 30" x2 Blue 30" x2                         Pt Ed         Re-Evaluation      DK   Modalities Heat/ice (prn) Ice x8' Ice x8' Ice x8' Ice x8' Ice x8' Ice x7'

## 2022-10-21 ENCOUNTER — DOCUMENTATION (OUTPATIENT)
Dept: OTHER | Facility: HOSPITAL | Age: 67
End: 2022-10-21

## 2022-10-21 ENCOUNTER — HOSPITAL ENCOUNTER (OUTPATIENT)
Dept: INFUSION CENTER | Facility: CLINIC | Age: 67
End: 2022-10-21
Payer: COMMERCIAL

## 2022-10-21 ENCOUNTER — OFFICE VISIT (OUTPATIENT)
Dept: GYNECOLOGIC ONCOLOGY | Facility: CLINIC | Age: 67
End: 2022-10-21

## 2022-10-21 VITALS
TEMPERATURE: 97.9 F | BODY MASS INDEX: 25.1 KG/M2 | HEIGHT: 59 IN | SYSTOLIC BLOOD PRESSURE: 112 MMHG | HEART RATE: 82 BPM | RESPIRATION RATE: 18 BRPM | DIASTOLIC BLOOD PRESSURE: 82 MMHG | WEIGHT: 124.5 LBS | OXYGEN SATURATION: 98 %

## 2022-10-21 DIAGNOSIS — Z93.6 NEPHROSTOMY STATUS (HCC): ICD-10-CM

## 2022-10-21 DIAGNOSIS — K08.9 DENTAL DISORDER: ICD-10-CM

## 2022-10-21 DIAGNOSIS — N32.81 OVERACTIVE BLADDER: ICD-10-CM

## 2022-10-21 DIAGNOSIS — C54.1 ENDOMETRIAL CANCER (HCC): Primary | ICD-10-CM

## 2022-10-21 DIAGNOSIS — K12.30 MUCOSITIS: ICD-10-CM

## 2022-10-21 DIAGNOSIS — C54.1 ENDOMETRIAL CANCER (HCC): Primary | Chronic | ICD-10-CM

## 2022-10-21 LAB
ALBUMIN SERPL BCP-MCNC: 4.1 G/DL (ref 3.5–5)
ALP SERPL-CCNC: 112 U/L (ref 34–104)
ALT SERPL W P-5'-P-CCNC: 8 U/L (ref 7–52)
AMYLASE SERPL-CCNC: 22 IU/L (ref 29–103)
ANION GAP SERPL CALCULATED.3IONS-SCNC: 11 MMOL/L (ref 4–13)
APTT PPP: 41 SECONDS (ref 23–37)
AST SERPL W P-5'-P-CCNC: 10 U/L (ref 13–39)
BACTERIA UR QL AUTO: ABNORMAL /HPF
BASOPHILS # BLD AUTO: 0.03 THOUSANDS/ÂΜL (ref 0–0.1)
BASOPHILS NFR BLD AUTO: 1 % (ref 0–1)
BILIRUB SERPL-MCNC: 0.24 MG/DL (ref 0.2–1)
BILIRUB UR QL STRIP: NEGATIVE
BUN SERPL-MCNC: 24 MG/DL (ref 5–25)
CALCIUM SERPL-MCNC: 9.3 MG/DL (ref 8.4–10.2)
CHLORIDE SERPL-SCNC: 106 MMOL/L (ref 96–108)
CHOLEST SERPL-MCNC: 171 MG/DL
CLARITY UR: ABNORMAL
CO2 SERPL-SCNC: 23 MMOL/L (ref 21–32)
COLOR UR: ABNORMAL
CREAT SERPL-MCNC: 1.05 MG/DL (ref 0.6–1.3)
CREAT UR-MCNC: 63.2 MG/DL
EOSINOPHIL # BLD AUTO: 0.01 THOUSAND/ÂΜL (ref 0–0.61)
EOSINOPHIL NFR BLD AUTO: 0 % (ref 0–6)
ERYTHROCYTE [DISTWIDTH] IN BLOOD BY AUTOMATED COUNT: 13.2 % (ref 11.6–15.1)
GFR SERPL CREATININE-BSD FRML MDRD: 55 ML/MIN/1.73SQ M
GGT SERPL-CCNC: 11 U/L (ref 5–85)
GLUCOSE P FAST SERPL-MCNC: 173 MG/DL (ref 65–99)
GLUCOSE SERPL-MCNC: 173 MG/DL (ref 65–140)
GLUCOSE UR STRIP-MCNC: NEGATIVE MG/DL
HCT VFR BLD AUTO: 34.9 % (ref 34.8–46.1)
HGB BLD-MCNC: 11.1 G/DL (ref 11.5–15.4)
HGB UR QL STRIP.AUTO: ABNORMAL
IMM GRANULOCYTES # BLD AUTO: 0.08 THOUSAND/UL (ref 0–0.2)
IMM GRANULOCYTES NFR BLD AUTO: 1 % (ref 0–2)
INR PPP: 0.94 (ref 0.84–1.19)
KETONES UR STRIP-MCNC: NEGATIVE MG/DL
LEUKOCYTE ESTERASE UR QL STRIP: ABNORMAL
LIPASE SERPL-CCNC: 33 U/L (ref 11–82)
LYMPHOCYTES # BLD AUTO: 0.62 THOUSANDS/ÂΜL (ref 0.6–4.47)
LYMPHOCYTES NFR BLD AUTO: 10 % (ref 14–44)
MAGNESIUM SERPL-MCNC: 1.8 MG/DL (ref 1.9–2.7)
MCH RBC QN AUTO: 30.7 PG (ref 26.8–34.3)
MCHC RBC AUTO-ENTMCNC: 31.8 G/DL (ref 31.4–37.4)
MCV RBC AUTO: 96 FL (ref 82–98)
MONOCYTES # BLD AUTO: 0.6 THOUSAND/ÂΜL (ref 0.17–1.22)
MONOCYTES NFR BLD AUTO: 9 % (ref 4–12)
NEUTROPHILS # BLD AUTO: 5.07 THOUSANDS/ÂΜL (ref 1.85–7.62)
NEUTS SEG NFR BLD AUTO: 79 % (ref 43–75)
NITRITE UR QL STRIP: NEGATIVE
NON-SQ EPI CELLS URNS QL MICRO: ABNORMAL /HPF
NRBC BLD AUTO-RTO: 0 /100 WBCS
PH UR STRIP.AUTO: 6 [PH]
PHOSPHATE SERPL-MCNC: 3.5 MG/DL (ref 2.3–4.1)
PLATELET # BLD AUTO: 276 THOUSANDS/UL (ref 149–390)
PMV BLD AUTO: 10.3 FL (ref 8.9–12.7)
POTASSIUM SERPL-SCNC: 3.2 MMOL/L (ref 3.5–5.3)
PROT SERPL-MCNC: 7.4 G/DL (ref 6.4–8.4)
PROT UR STRIP-MCNC: ABNORMAL MG/DL
PROT UR-MCNC: 177 MG/DL
PROT/CREAT UR: 2.8 MG/G{CREAT} (ref 0–0.1)
PROTHROMBIN TIME: 12.8 SECONDS (ref 11.6–14.5)
RBC # BLD AUTO: 3.62 MILLION/UL (ref 3.81–5.12)
RBC #/AREA URNS AUTO: ABNORMAL /HPF
SODIUM SERPL-SCNC: 140 MMOL/L (ref 135–147)
SP GR UR STRIP.AUTO: 1.01 (ref 1–1.03)
T3 SERPL-MCNC: 1.1 NG/ML (ref 0.6–1.8)
T4 FREE SERPL-MCNC: 1 NG/DL (ref 0.76–1.46)
TSH SERPL DL<=0.05 MIU/L-ACNC: 2.31 UIU/ML (ref 0.45–4.5)
UROBILINOGEN UR STRIP-ACNC: <2 MG/DL
WBC # BLD AUTO: 6.41 THOUSAND/UL (ref 4.31–10.16)
WBC #/AREA URNS AUTO: ABNORMAL /HPF
WBC CLUMPS # UR AUTO: PRESENT /UL

## 2022-10-21 PROCEDURE — 84480 ASSAY TRIIODOTHYRONINE (T3): CPT

## 2022-10-21 PROCEDURE — 83690 ASSAY OF LIPASE: CPT

## 2022-10-21 PROCEDURE — 85025 COMPLETE CBC W/AUTO DIFF WBC: CPT

## 2022-10-21 PROCEDURE — 82977 ASSAY OF GGT: CPT

## 2022-10-21 PROCEDURE — 84100 ASSAY OF PHOSPHORUS: CPT

## 2022-10-21 PROCEDURE — 82465 ASSAY BLD/SERUM CHOLESTEROL: CPT

## 2022-10-21 PROCEDURE — 81001 URINALYSIS AUTO W/SCOPE: CPT

## 2022-10-21 PROCEDURE — 84439 ASSAY OF FREE THYROXINE: CPT

## 2022-10-21 PROCEDURE — 85730 THROMBOPLASTIN TIME PARTIAL: CPT

## 2022-10-21 PROCEDURE — 84156 ASSAY OF PROTEIN URINE: CPT

## 2022-10-21 PROCEDURE — 84443 ASSAY THYROID STIM HORMONE: CPT

## 2022-10-21 PROCEDURE — 85610 PROTHROMBIN TIME: CPT

## 2022-10-21 PROCEDURE — 80053 COMPREHEN METABOLIC PANEL: CPT

## 2022-10-21 PROCEDURE — 83735 ASSAY OF MAGNESIUM: CPT

## 2022-10-21 PROCEDURE — 82570 ASSAY OF URINE CREATININE: CPT

## 2022-10-21 PROCEDURE — 82150 ASSAY OF AMYLASE: CPT

## 2022-10-21 RX ORDER — SODIUM CHLORIDE 9 MG/ML
20 INJECTION, SOLUTION INTRAVENOUS CONTINUOUS
Status: DISCONTINUED | OUTPATIENT
Start: 2022-10-24 | End: 2022-10-27 | Stop reason: HOSPADM

## 2022-10-21 NOTE — PROGRESS NOTES
Assessment/Plan:    Problem List Items Addressed This Visit        Digestive    Dental disorder     Needs tooth extraction  Will hold avastin until complete  Genitourinary    Endometrial cancer ([facility] [address] ) - Primary (Chronic)     74year-old with recurrent stage IB endometrial cancer who is receiving treatment per the [facility] trial presents for pre cycle visit  Last cycle held given significant mucositis, weight loss and elevated UPC  Last labs 10/1  Repeat pending from today  Will most likely continue to hold avastin and proceed with other 2 agents  Overactive bladder       Other    Nephrostomy status (HCC)    Mucositis     Improved with steroids and break  May be related to dental needs                  CHIEF COMPLAINT: pre chemo      Problem:  Cancer Staging  Endometrial cancer [facility])  Staging form: Corpus Uteri - Carcinoma, AJCC 7th Edition  - Clinical stage from 12/19/2017: FIGO Stage IB (T1b, N0, M0) - Signed by Pilar St MD on 2/12/2018        Previous therapy:  Oncology History   Endometrial cancer ([facility] [address] )   11/17/2017 Initial Diagnosis    Endometrial cancer ([facility] [address] )     11/17/2017 Biopsy    ENDOMETRIAL BIOPSY: WELL DIFFERENTIATED ENDOMETRIAL ADENOCARCINOMA (FIGO I) WITH FOCALMUCINOUS FEATURES  Part B: ENDOCERVICAL POLYP:BENIGN ENDOCERVICAL      12/19/2017 Surgery    Robotic assisted total laparoscopic hysterectomy with bilateral salpingo-oophorectomy and sentinel bilateral pelvic lymph node dissection  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 (4 4 x 3 2 cm tumor, 9 4/15 4mm invasion, NO LVSI, washings revealed atypical cellular changes)     12/19/2017 Genetic Testing    Morrison testing negative     6/28/2019 Biopsy    A  Breast, Right, US BX Right Breast 1000 4 cmfn:  - Benign breast tissue with focal histiocytic aggregate  See comment   - Negative for atypia and in-situ or invasive carcinoma       7/8/2019 Recurrence    Presented with right lower extremity DVT and CT demonstrating right pelvic sidewall mass with venous, ureteral and nerve compression causing significant neuropathic pain  Biopsy:  Lymph Node, Right pelvic lymph node x3:  - High-grade adenocarcinoma  7/30/2019 - 1/6/2020 Chemotherapy    Taxol 175 mg/m2 and carboplatin AUC 6 every 21 days  Dose was reduced to taxol 135 mg/m2 and carboplatin AUC 5  Completed 6 cycles  Treatment protracted due to multiple hospital admissions  2/24/2020 - 4/13/2020 Radiation    adjuvant external beam radiation therapy to the whole pelvis to 4500 cGy followed by boost to gross disease of an additional 2200 cGy     11/23/2020 Progression    New necrotic adenopathy in the retroperitoneum on CT      12/21/2020 -  Chemotherapy    Began chemotherapy with rucaparib, atezolizumab, and bevacizumab as per [facility] clinical trial            Patient ID: Nathanael Lyons is a 74 y o  female  70-year-old with recurrent stage IB endometrial cancer who is receiving treatment per the [facility] trial presents for pre cycle visit  Patient reports significant improvement in her overall well-being since chemo break  She still has mild mucositis but does feel that it may be related to the need for dental extractions which are scheduled for early November  Her appetite has improved  She is undergoing further physical therapy for her recent break  She still has symptoms of overactive bladder and is awaiting medication to be approved by insurance to start  No further blood in her urine  Recently had PCNU capped on 9/19 and has follow-up with Urology for possible internalization in November  She reports that she is been having overactive bladder symptoms but she had prior to the PCN placement in that she is urinating larger volume feels it is occurring again  She also notes hematuria at times  She is had difficulty eating in the last few weeks given persistent mucositis and has had noticeable weight loss    She is still healing from a left upper extremity fracture  She also was recently diagnosed with COVID and has a persistent cough and weakness noted  The following portions of the patient's history were reviewed and updated as appropriate: allergies, current medications, past family history, past medical history, past social history, past surgical history and problem list     Review of Systems   Constitutional: Positive for fatigue  Negative for appetite change, chills and fever  HENT: Positive for mouth sores  Respiratory: Negative for chest tightness and shortness of breath  Gastrointestinal: Negative for abdominal distention, abdominal pain, constipation, diarrhea and nausea  Genitourinary: Negative for difficulty urinating, flank pain, frequency, urgency, vaginal bleeding, vaginal discharge and vaginal pain  Musculoskeletal: Negative for back pain, joint swelling and myalgias  Skin: Negative for rash  Neurological: Negative for dizziness, light-headedness, numbness and headaches  Current Outpatient Medications   Medication Sig Dispense Refill   • ARIPiprazole (ABILIFY) 20 MG tablet Take 20 mg by mouth in the morning  • aspirin (ECOTRIN LOW STRENGTH) 81 mg EC tablet Take 81 mg by mouth daily     • BD PosiFlush 0 9 % SOLN      • Calcium-Magnesium-Vitamin D (CALCIUM 500 PO) Take by mouth daily      • cholecalciferol (VITAMIN D3) 1,000 units tablet Take 1,000 Units by mouth daily     • Cranberry-Vitamin C 250-60 MG CAPS Take 1 tablet by mouth in the morning 90 capsule 3   • Cyanocobalamin (VITAMIN B12 PO) Take by mouth daily      • dronabinol (MARINOL) 2 5 mg capsule Take 1 capsule (2 5 mg total) by mouth 2 (two) times a day before meals 30 capsule 0   • enoxaparin (LOVENOX) 80 mg/0 8 mL Inject 0 8 mL (80 mg total) under the skin every 24 hours 72 mL 1   • estradiol (ESTRACE VAGINAL) 0 1 mg/g vaginal cream Insert 1g nightly for 6 weeks, then 1-2x weekly   48 7 g 1   • folic acid (FOLVITE) 1 mg tablet Take 1 tablet (1 mg total) by mouth daily  0   • gemfibrozil (LOPID) 600 mg tablet TAKE 1 TABLET BY MOUTH EVERY DAY 90 tablet 0   • LORazepam (ATIVAN) 0 5 mg tablet Take 1 tablet (0 5 mg total) by mouth every 6 (six) hours as needed for anxiety (or nausea) 36 tablet 1   • methylprednisolone (MEDROL) 4 mg tablet Take 1 tablet (4 mg total) by mouth in the morning Take 2 tables once a day for 5 days, take 1 tablet once a day for 5 days, take 1 tablet every other day for 14 days  21 tablet 0   • Multiple Vitamin (MULTIVITAMIN) tablet Take 1 tablet by mouth daily     • naloxone (NARCAN) 4 mg/0 1 mL nasal spray Administer 1 spray into a nostril  If no response after 2-3 minutes, give another dose in the other nostril using a new spray  1 each 0   • omeprazole (PriLOSEC) 20 mg delayed release capsule Take 20 mg by mouth daily     • ondansetron (ZOFRAN) 8 mg tablet Take 1 tablet (8 mg total) by mouth every 8 (eight) hours as needed for nausea or vomiting 30 tablet 1   • oxybutynin (DITROPAN XL) 15 MG 24 hr tablet Take 1 tablet (15 mg total) by mouth daily at bedtime 90 tablet 3   • rucaparib (RUBRACA) 300 mg tablet Take 600 mg by mouth every 12 (twelve) hours Take with or without food  Do not repeat a vomited dose  • saccharomyces boulardii (FLORASTOR) 250 mg capsule Take 1 capsule (250 mg total) by mouth 2 (two) times a day  0   • senna (SENOKOT) 8 6 MG tablet Take 1 tablet (8 6 mg total) by mouth 2 (two) times a day as needed for constipation 60 tablet 0   • sodium chloride, PF, 0 9 % 10 mL by Intracatheter route daily 300 mL 3   • venlafaxine (EFFEXOR) 75 mg tablet Take 75 mg by mouth 3 (three) times a day       • Vibegron 75 MG TABS Take 75 mg by mouth in the morning 30 tablet 3   • clotrimazole-betamethasone (LOTRISONE) 1-0 05 % cream Apply topically 2 (two) times a day (Patient not taking: No sig reported) 30 g 0     No current facility-administered medications for this visit             Objective:    Blood pressure 112/82, pulse 82, temperature 97 9 °F (36 6 °C), temperature source Temporal, resp  rate 18, height 4' 11" (1 499 m), weight 56 5 kg (124 lb 8 oz), SpO2 98 %, not currently breastfeeding  Body mass index is 25 15 kg/m²  Body surface area is 1 51 meters squared  Physical Exam  HENT:      Head: Normocephalic and atraumatic  Nose: Nose normal    Cardiovascular:      Rate and Rhythm: Normal rate and regular rhythm  Pulmonary:      Effort: Pulmonary effort is normal    Abdominal:      General: There is no distension  Palpations: Abdomen is soft  There is no mass  Genitourinary:     Comments: defer  Musculoskeletal:         General: No swelling  Normal range of motion  Cervical back: Normal range of motion  Skin:     General: Skin is warm and dry  Neurological:      General: No focal deficit present  Mental Status: She is alert     Psychiatric:         Mood and Affect: Mood normal            Lab Results   Component Value Date     10/27/2017    K 3 2 (L) 10/21/2022     10/21/2022    CO2 23 10/21/2022    BUN 24 10/21/2022    CREATININE 1 05 10/21/2022    GLUCOSE 219 (H) 12/19/2017    GLUF 173 (H) 10/21/2022    CALCIUM 9 3 10/21/2022    CORRECTEDCA 9 4 07/29/2022    AST 10 (L) 10/21/2022    ALT 8 10/21/2022    ALKPHOS 112 (H) 10/21/2022    PROT 6 9 10/27/2017    BILITOT 0 3 10/27/2017    EGFR 55 10/21/2022     Lab Results   Component Value Date    WBC 6 41 10/21/2022    HGB 11 1 (L) 10/21/2022    HCT 34 9 10/21/2022    MCV 96 10/21/2022     10/21/2022     Lab Results   Component Value Date    NEUTROABS 5 07 10/21/2022        Trend:  No results found for:

## 2022-10-21 NOTE — ASSESSMENT & PLAN NOTE
62-year-old with recurrent stage IB endometrial cancer who is receiving treatment per the [facility] trial presents for pre cycle visit  Last cycle held given significant mucositis, weight loss and elevated UPC  Last labs 10/1  Repeat pending from today  Will most likely continue to hold avastin and proceed with other 2 agents

## 2022-10-21 NOTE — PROGRESS NOTES
Labs were reviewed and signed by Dr Zain Anderson   Per protocol, pt is okay to proceed with treatment for cycle 33 day 1 scheduled 24-Oct-2022  Patients Avastin will be held this cycle   Proceed with Atezolizumab and pill dispense for Rucaparib

## 2022-10-21 NOTE — PROGRESS NOTES
Ragini Melendez (1955) is patient #[id number] on the [facility] clinical trial  Pt was seen for cycle 33 pre-treatment office visit and physical exam With Dr Alivia Ramon  Patient AE and [place] where reviewed  Patient states that she still has some mild mouth sores but feels they are due to the dental issues she is having and she is scheduled to have 2 teeth extracted in early November  She still has mild fatigue but over all doing better  Patient states that she is waiting on urology to start her on another medication for urinary incontinence  No change to her other medication  Catie Jo decided to hold her avastin again due to her upcoming dental work  Patient will proceed with cycle 33 on 10/24/2022 with  atezolizumab and her rucaparib  Patient is in agreement with this plan   Patient will call with any questions or concerns

## 2022-10-21 NOTE — PROGRESS NOTES
Patient presents today for lab draw  Patient offers no complaints  Port accessed without incident with excellent blood return noted  Labs drawn as per order  Port flushed and de-accessed as per protocol  Next appointment confirmed  AVS offered and declined

## 2022-10-24 ENCOUNTER — HOSPITAL ENCOUNTER (OUTPATIENT)
Dept: INFUSION CENTER | Facility: CLINIC | Age: 67
Discharge: HOME | End: 2022-10-24
Payer: COMMERCIAL

## 2022-10-24 ENCOUNTER — NUTRITION (OUTPATIENT)
Dept: NUTRITION | Facility: CLINIC | Age: 67
End: 2022-10-24

## 2022-10-24 VITALS
DIASTOLIC BLOOD PRESSURE: 64 MMHG | OXYGEN SATURATION: 98 % | RESPIRATION RATE: 18 BRPM | HEART RATE: 86 BPM | TEMPERATURE: 98 F | SYSTOLIC BLOOD PRESSURE: 104 MMHG

## 2022-10-24 DIAGNOSIS — Z71.3 NUTRITIONAL COUNSELING: Primary | ICD-10-CM

## 2022-10-24 PROCEDURE — J9999Q0 INV ATEZOLIZUMAB 1200MG/20ML 20 ML: Performed by: OBSTETRICS & GYNECOLOGY

## 2022-10-24 RX ADMIN — Medication 1200 MG: at 09:30

## 2022-10-24 RX ADMIN — SODIUM CHLORIDE 20 ML/HR: 0.9 INJECTION, SOLUTION INTRAVENOUS at 09:27

## 2022-10-24 NOTE — PATIENT INSTRUCTIONS
Nutrition Rx & Recommendations:  Diet: High Calorie, High Protein (for high calorie foods see pages 52-53, and for high protein foods see pages 49-51 in your Eating Hints book)  Small, frequent meals/snacks may be easier to tolerate than 3 large daily meals  Aim for 5-6 small meals per day (every 2-3 hours)  Include protein at all meals/snacks  Follow proper oral care; Try baking soda/salt water rinse recipe (mix 3/4 tsp salt + 1 tsp baking soda + 1 qt water; rinse with plain water after using) in Eating Hints book (pg 18)  Brush your teeth before/after meals & before bed  For sore mouth/throat: choose foods that are easy to chew/swallow; cook foods until they are soft/tender; moisten & soften foods (gravy/sauce/broth/yogurt); cut food into small pieces; drink with a straw (as tolerated); eat with a very small spoon; eat cold or room temperature food; suck on ice chips; Avoid citrus, spicy foods, tomatoes/ketchup, salty foods, raw vegetables, sharp/crunchy/hard foods & alcohol (see pages 23-28 in your Eating Hints book)  For dry mouth: sip water throughout the day; try very sweet (or tart, as tolerated) drinks; chew gum or suck on hard candy, popsicles, & ice chips; eat easy to swallow foods (such as pureed cooked foods or soups); moisten food with sauce/gravy/salad dressing; do not drink beer, wine, or any type of alcohol; keep lips moist with lip balm; avoid tobacco products & second-hand smoke; try Biotene as needed (see pages 17-18 in your Eating Hints book)  Follow proper oral care; Try baking soda/salt water rinse recipe (mix 3/4 tsp salt + 1 tsp baking soda + 1 qt water; rinse with pain water after using) in Eating Hints book (pg 18)  Brush your teeth before/after meals & before bed  Weigh yourself regularly  If you notice weight loss, make an effort to increase your daily food/calorie intake   If you continue to notice loss after these efforts, reach out to your dietitian to establish a plan to stabilize weight     Snack ideas: [place] yogurt with fruit, pudding, veggies with humus OR try greek yogurt based vegetable dip, peanut butter, grapes and cheese, fruit with a cheese stick , cottage cheese, soup, unjury, protein shakes/smoothies  Continue to keep a food journal as long as it is helpful    Patient choosen goals:  Resume Premier shake daily as needed for weight loss  Increase vegetable intake  Try new, simple recipes;  Try crockpot recipes    Follow Up Plan: 12/5 during infusion   Recommend Referral to Other Providers: none at this time

## 2022-10-24 NOTE — PROGRESS NOTES
Patient here for Azetolizumab on clinical trial  She still has some mouth sores but they are improving  Labs reviewed by Dr Grant Matos  Patient tolerated her infusion without incident  She is aware of her next appointment   Declined AVS

## 2022-10-25 ENCOUNTER — DOCUMENTATION (OUTPATIENT)
Dept: OTHER | Facility: HOSPITAL | Age: 67
End: 2022-10-25

## 2022-10-25 ENCOUNTER — OFFICE VISIT (OUTPATIENT)
Dept: PALLIATIVE MEDICINE | Facility: CLINIC | Age: 67
End: 2022-10-25
Payer: COMMERCIAL

## 2022-10-25 VITALS
TEMPERATURE: 97.9 F | SYSTOLIC BLOOD PRESSURE: 122 MMHG | HEART RATE: 81 BPM | WEIGHT: 124.34 LBS | DIASTOLIC BLOOD PRESSURE: 84 MMHG | BODY MASS INDEX: 25.11 KG/M2 | OXYGEN SATURATION: 99 %

## 2022-10-25 DIAGNOSIS — K59.03 DRUG INDUCED CONSTIPATION: Primary | ICD-10-CM

## 2022-10-25 DIAGNOSIS — F11.20 CONTINUOUS OPIOID DEPENDENCE (HCC): ICD-10-CM

## 2022-10-25 DIAGNOSIS — C54.1 ENDOMETRIAL CANCER (HCC): Chronic | ICD-10-CM

## 2022-10-25 PROCEDURE — 99214 OFFICE O/P EST MOD 30 MIN: CPT | Performed by: NURSE PRACTITIONER

## 2022-10-25 NOTE — PROGRESS NOTES
Outpatient Follow-Up - Palliative and Supportive Care   Ragini Melendez 74 y o  female [id number]    Assessment & Plan  Problem List Items Addressed This Visit        Digestive    Drug induced constipation - Primary       Genitourinary    Endometrial cancer ([address] ) (Chronic)       Other    Continuous opioid dependence ([address] )          Medications adjusted this encounter:  Requested Prescriptions      No prescriptions requested or ordered in this encounter     No orders of the defined types were placed in this encounter  There are no discontinued medications  Artsaúl Dacosta was seen today for symptoms and planning cares related to above illnesses  I have reviewed the patient's controlled substance dispensing history in the Prescription Drug Monitoring Program in compliance with the [facility] regulations before prescribing any controlled substances  They are invited to continue to follow with us  If there are questions or concerns, please contact us through our clinic/answering service 24 hours a day, seven days a week  April [address] [facility]  [phone]      Visit Information    Accompanied By: No one    Source of History: Self    History Limitations: None       History of Present Illness      Edwige Melendez is a 74 y o  female who presents in follow up of symptoms related to endometrial cancer  Pertinent issues include: symptom management, pain, neoplasm related, assessment of goals of care, support in serious illness  The patient restarted her Rucaparib at the beginning of August and was receiving treatment per the [facility] trial   Treatments were being held in setting of significant mucositis, weight loss and elevated UPC  She was last seen by gyn onc on 10/21  She had a significant improvement in symptoms; fatigue, mucocitis, appetite while treatments were held      Overall she states she is feeling well    Her appetite has improves, she sleeps well at night and has not been experiencing pain  She reports mucocitis has resolved  She reports that she has mouth rinse that was recommended for when she restarts treatments  She does have an appointment with nephrology this week as her GFR has declined from 64 to 55 over the past serveral months  In August she had fallen and broken her arm  The cast is now removed and she is able to drive again which she is happy about  She has found that she has been enjoying cooking meal for herself and her mother  She lives with her mom who she helps  Her mom has been having increased difficulty getting around the house  The patient has been reaching out to her mom's PCP for assistance  Follow up in 3 months,       Past medical, surgical, social, and family histories are reviewed and pertinent updates are made  Review of Systems   Constitutional: Negative for activity change, chills and fever  HENT: Negative for ear pain and sore throat  Eyes: Negative for pain and visual disturbance  Respiratory: Negative for cough and shortness of breath  Cardiovascular: Negative for chest pain and palpitations  Gastrointestinal: Negative for abdominal pain and vomiting  Genitourinary: Negative for dysuria and hematuria  Musculoskeletal: Negative for arthralgias and back pain  Skin: Negative for color change and rash  Neurological: Negative for seizures and syncope  All other systems reviewed and are negative  Vital Signs    /84 (BP Location: Right arm, Patient Position: Sitting, Cuff Size: Standard)   Pulse 81   Temp 97 9 °F (36 6 °C)   Wt 56 4 kg (124 lb 5 4 oz)   LMP  (LMP Unknown)   SpO2 99%   BMI 25 11 kg/m²     Physical Exam and Objective Data  Physical Exam  Vitals and nursing note reviewed  Constitutional:       General: She is awake  She is not in acute distress  Appearance: She is well-developed  HENT:      Head: Normocephalic and atraumatic        Mouth/Throat:      Lips: Pink       Mouth: Mucous membranes are moist    Eyes:      General: Lids are normal       Conjunctiva/sclera: Conjunctivae normal    Cardiovascular:      Rate and Rhythm: Normal rate and regular rhythm  Pulses: Normal pulses  Heart sounds: No murmur heard  Pulmonary:      Effort: Pulmonary effort is normal  No respiratory distress or retractions  Breath sounds: Normal breath sounds  Abdominal:      Palpations: Abdomen is soft  Tenderness: There is no abdominal tenderness  Musculoskeletal:      Cervical back: Neck supple  Comments: Equal extremity strength    Skin:     General: Skin is warm and dry  Neurological:      General: No focal deficit present  Mental Status: She is alert and oriented to person, place, and time  GCS: GCS eye subscore is 4  GCS verbal subscore is 5  GCS motor subscore is 6  Psychiatric:         Attention and Perception: Attention normal          Mood and Affect: Mood normal          Behavior: Behavior is cooperative  Cognition and Memory: Cognition and memory normal          30+  minutes was spent face to face with Ragini Melendez with greater than 50% of the time spent in counseling or coordination of care including discussions of prognosis of diagnosis, follow up requirements and support in serious illness  All of the patient's or agent's questions were answered during this discussion

## 2022-10-25 NOTE — PROGRESS NOTES
Late entry note, upon chart review original note did not save  Ragini Melendez (1955) is patient#[id number] on the [facility] clinical trial  Pt was seen for treatment in the infusion center for cycle 33 on 10/24/2022  Patient was given Cycle 33 Rucaparib pills and drug diary  Patient previous cycle was held so no drug diary or medication was available for return  Patient denied any changes to conmeds since office visit prior to infusion  Patient states she feels well overall  Patient was told to call with any questions or concerns   Patient tolerated treatment without incident

## 2022-10-26 ENCOUNTER — OFFICE VISIT (OUTPATIENT)
Dept: PHYSICAL THERAPY | Facility: CLINIC | Age: 67
End: 2022-10-26
Payer: COMMERCIAL

## 2022-10-26 DIAGNOSIS — S42.295D OTHER CLOSED NONDISPLACED FRACTURE OF PROXIMAL END OF LEFT HUMERUS WITH ROUTINE HEALING, SUBSEQUENT ENCOUNTER: Primary | ICD-10-CM

## 2022-10-26 PROCEDURE — 97110 THERAPEUTIC EXERCISES: CPT

## 2022-10-26 PROCEDURE — 97140 MANUAL THERAPY 1/> REGIONS: CPT

## 2022-10-26 PROCEDURE — 97010 HOT OR COLD PACKS THERAPY: CPT

## 2022-10-26 NOTE — PROGRESS NOTES
Daily Note     Today's date: 10/26/2022  Patient name: Caridad Celis  : 1955  MRN: [id number]  Referring provider: Hampton Nyhan, DO  Dx:   Encounter Diagnosis     ICD-10-CM    1  Other closed nondisplaced fracture of proximal end of left humerus with routine healing, subsequent encounter  S42 [id number]        Start Time: 915  Stop Time: 1000  Total time in clinic (min): 45 minutes    Subjective: Patient reports some soreness in her Left shoulder "probably from the weather"  Patient reports she had difficulty with putting on her jacket, especially while starting with her Left hand  Objective: See treatment diary below      Assessment: Tolerated treatment well  Educated patient on proper mechanics with putting on jacket to avoid pain (reaching forward instead of laterally while putting Left arm in first)  Added standing AAROM with cane for abduction to further progress movement of shoulder in varying directions  Added TB mid rows and shoulder ext to also further progress scapular and UE strength as patient can tolerate  Patient was Patient exhibited good technique with therapeutic exercises and would benefit from continued PT      Plan: Continue per plan of care  Progress treatment as tolerated         Diagnosis: L proximal humerus fracture (DOI 2022)  Precautions: HTN, DM, hx of chemo, recent nephro stent (2022)  ( * astericks indicates given per HEP)    Manuals 10/4 10/7 10/12 10/14 10/17 10/19 10/26   STM DK DK DK DK DK DK DK   IASTM          PROM DK DK DK DK DK DK DK             Neuro Re-Ed          scap squeezes* 2x10 2x10        BWD shoulder rolls 2x10 2x10        Shoulder iso (4-way)   5" x10ea 5" x10ea 5" x10ea 5" x10ea 5" x10ea   Pendulums*          Mid Rows      NV RTB 2x10   Shoulder Ext      NV RTB 2x10             Ther Ex          Elbow flex/ext* supine 2x10 supine 2x10 supine 2x10 Supine #1 2x10 Supine #1 2x10     physioball rollouts seated Seated green ball 2x10 Seated green ball 2x10        Table slides flex and ABD (standing) Flex 2x10  Scap 2x10, 3-5 sec hold ea Flex 2x10  Scap 2x10, 3" hold ea With p-ball FWD, scap 2x10ea With p-ball FWD, scap 2x10ea With p-ball FWD, scap 2x10ea     AAROM ER with cane supine 2x10 supine 2x10 supine 2x10 supine 2x10 supine 2x10 Seated 2x10 Seated 2x10   AAROM flexion with cane  Supine x10 supine 2x10 supine 2x10 supine 2x10 supine 2x10 supine 2x10   AAROM ABD with cane       Standing x15   Wall slides with towel     Flex x10 Flex x10 Flex x15             Ther Activity          Gripping Green 30" x2 Blue 30" x2                            Pt Ed          Re-Evaluation      DK    Modalities          Heat/ice (prn) Ice x8' Ice x8' Ice x8' Ice x8' Ice x8' Ice x7' Ice x8'

## 2022-10-27 ENCOUNTER — CONSULT (OUTPATIENT)
Dept: NEPHROLOGY | Facility: CLINIC | Age: 67
End: 2022-10-27

## 2022-10-27 VITALS
SYSTOLIC BLOOD PRESSURE: 120 MMHG | WEIGHT: 123.8 LBS | BODY MASS INDEX: 24.96 KG/M2 | HEART RATE: 80 BPM | DIASTOLIC BLOOD PRESSURE: 80 MMHG | HEIGHT: 59 IN

## 2022-10-27 DIAGNOSIS — N14.4 NEPHROTOXICITY: ICD-10-CM

## 2022-10-27 DIAGNOSIS — N18.31 STAGE 3A CHRONIC KIDNEY DISEASE (HCC): ICD-10-CM

## 2022-10-27 DIAGNOSIS — R80.9 PROTEINURIA, UNSPECIFIED TYPE: Primary | ICD-10-CM

## 2022-10-27 LAB
SL AMB  POCT GLUCOSE, UA: ABNORMAL
SL AMB LEUKOCYTE ESTERASE,UA: ABNORMAL
SL AMB POCT BILIRUBIN,UA: ABNORMAL
SL AMB POCT BLOOD,UA: ABNORMAL
SL AMB POCT CLARITY,UA: ABNORMAL
SL AMB POCT COLOR,UA: ABNORMAL
SL AMB POCT KETONES,UA: ABNORMAL
SL AMB POCT NITRITE,UA: ABNORMAL
SL AMB POCT PH,UA: 6
SL AMB POCT SPECIFIC GRAVITY,UA: 1.02
SL AMB POCT URINE PROTEIN: 300
SL AMB POCT UROBILINOGEN: 0.2

## 2022-10-27 RX ORDER — LISINOPRIL 5 MG/1
5 TABLET ORAL DAILY
Qty: 90 TABLET | Refills: 3 | Status: SHIPPED | OUTPATIENT
Start: 2022-10-27 | End: 2023-01-25

## 2022-10-27 NOTE — PROGRESS NOTES
NEPHROLOGY OUTPATIENT CONSULTATION   Ragini Melendez 74 y o  female MRN: [id number]  Date: 10/27/22  Reason for consultation:  Proteinuria    ASSESSMENT and PLAN:  62-year-old female was seen in Nephrology office today for evaluation of proteinuria      # Nephrotic range Proteinuria  - Urine albumin to creatinine ratio 150 milligram/gram in 2017  - 2+ proteinuria on dipstick in 2019   - Urine protein to creatinine ratio almost 1 g in 12/2020 before initiating Avastin  - Urine protein to creatinine ratio around 1-2 g since starting Avastin  - Urine protein to creatinine ratio most recently on the rise with a peak of 6 g on 10/01/2022  - Avastin was subsequently held after last dose on 09/09/2022  - Urine protein to creatinine ratio 2 80 on 10/21/2022 after Avastin has been on hold   - Urinalysis with large blood, innumerable RBCs, innumerable WBCs, moderate bacteriuria in setting of right-sided nephroureteral stent  - Urine sediment in office today with too numerous to count WBCs, few RBCs interspersed between them  - Serum albumin mostly above 3, most recently more than 4  - No evidence of hyperlipidemia  - HbA1c 4 80 07/2021  - Worsening of proteinuria is related to use of VEGF inhibitor but she can have baseline glomerular pathology as proteinuria dates back to 2017  - Differential diagnosis includes proteinuria secondary to VEGF inhibitor on top of secondary membranous nephropathy in setting of malignancy versus obesity related FSGS versus mild diabetic kidney disease    >> Plan  - Check HbA1c  - Check C3/C4   - Check MARY ANNE 2R antibody  - Check CHERI/double-stranded DNA   - Check SPEP/serum free light chain ratio   - Check hepatitis serologies including hepatitis-B and C and HIV  - Start low-dose ACE inhibitor, lisinopril 5 mg daily to decrease proteinuria  - Further discussion needed with Gyn-Oncology regarding use of Avastin in future  - She may need a kidney biopsy for further evaluation of proteinuria based on discussions with Gyn-Oncology    # Recurrent stage I B endometrial cancer  - Diagnosis was made in 2017 and she underwent total abdominal hysterectomy and bilateral salpingo oophorectomy   - Currently on ENDOBARR (rucaparib, atezolizumab, and bevacizumab) clinical trial started in 12/2020    # Chronic Kidney Disease Stage II/III  - Etiology/risk factors: Hypertension, obstructive uropathy, underlying glomerular pathology, age-related nephron loss   - Baseline Cr:  0 9-1 1, EGFR 55-60   - Urinalysis with large blood, innumerable RBCs, innumerable WBCs, moderate bacteriuria in setting of right-sided nephroureteral stent  - Proteinuria: As above   - Imaging: Right side nephroureteral catheter with subcapsular collection measuring 3 7 cm, mild right renal atrophy, right renal cysts, 2 mm nonobstructing calculus in the left lower pole  - Discussed risk factor reduction to slow progression of chronic kidney disease  - Discussed avoidance of NSAIDs due to their short-term and long-term effects on kidney function    # Right hydronephrosis   - Secondary to endometrial carcinoma, status post right nephrostomy tube with later conversion to nephroureteral stent   - 09/2022: Successful exchange of 10 Egyptian 24 cm nephroureteral stent under fluoroscopic control, Catheter was capped  - If the patient tolerates capping, the plan is to convert to a double-J stent at the next visit and urology will would then do the patient's double-J exchanges cystoscopically      # Hypertension/Volume   - Goal BP <120/80 per KDIGO guidelines   - Volume status: Euvolemic  - Status: Blood pressure currently at goal  - Current antihypertensive regimen:  None  - Changes: Lisinopril 5 mg added for decreasing proteinuria     # Screening for Anemia   - Target Hb: More than 10 g/dL  - Most recent hemoglobin: 11 1 g/dL    # Electrolytes/Acid Base status   - Mild hypokalemia on last lab check, repeat BMP    # CKD Mineral and Bone Disorder   - Goal Ca 8 5-10 mg/dL, goal Phos 2 7-4 6 mg/dL, goal iPTH 30-70 pg/mL  - Check 25 hydroxy vitamin-D and iPTH level    # Other issues   - History of DVT on Lovenox  - History of gastric bypass in 2001    HISTORY OF PRESENT ILLNESS:  Requesting Physician: Marcella Rehman DO    Lilia Meyer is a 74 y o  female who has history of endometrial cancer status post total abdominal hysterectomy and bilateral salpingo-oophorectomy, status post chemotherapy with Taxol and carboplatin in 2019, currently onrucaparib, atezolizumab, and bevacizumab  Bevacizumab was held on last 2 cycles due to worsening proteinuria  She has history of obesity for which he underwent gastric bypass surgery in 2001  She gained weight and was requiring metformin for prediabetes and quinapril for hypertension before she was diagnosed with endometrial cancer  Since diagnosis of endometrial cancer she has lost significant amount of weight and is not currently requiring any anti diabetic or antihypertensive medications  She denies dyspnea at rest or on exertion  She denies leg swelling  She has urinary incontinence  She seldom sees blood in her urine     >> Major risk factors for CKD  - Diabetes: Previously pre-diabetic on Metformin  - Hypertension: Previous history of hypertension but not on any medications   - Age ?  56 years: Yes   - Family history of kidney disease: Father had kidney stones, his cousin was on dialysis   - Obesity or metabolic syndrome: Yes     >> Medical history evaluation   - Prior kidney disease or dialysis: No  - Incidental hematuria in the past: Yes with ureteral stent in place   - Urinary symptoms: Urinary incontinence   - History of foamy or frothy urine: No   - History of nephrolithiasis: Yes but never passed a stone   - Diseases that share risk factors with CKD: DM, HTN  - Systemic diseases that might affect kidney: No   - History of use of medications that might affect renal function: No    PAST MEDICAL HISTORY:  Past Medical History:   Diagnosis Date   • Anemia    • Chemotherapy follow-up examination    • Depression    • Endometrial cancer ([facility] [address] ) 12/2017   • History of chemotherapy     started 12/2021endometrial cancer   • Hyperglycemia     vx type 2 dm -- last assessed 4/1/14; resolved 11/7/17   • Hyperlipidemia    • Hypertension    • Obesity     last assessed 10/14/17; resolved 11/7/17       PAST SURGICAL HISTORY:  Past Surgical History:   Procedure Laterality Date   • ABDOMINAL SURGERY      GASTRIC BYPASS   • BREAST BIOPSY Right 06/28/2019    core biopsy; benign   • CHOLECYSTECTOMY      at the time of gastric bypass   • COLONOSCOPY     • CT NEEDLE BIOPSY LYMPH NODE  07/08/2019   • FL GUIDED CENTRAL VENOUS ACCESS DEVICE INSERTION  11/12/2019   • GASTRIC BYPASS     • HYSTERECTOMY Bilateral 2017    total abdominal with salpingo-oophorectomy   • IR NEPHROSTOMY TO NEPHROURETERAL STENT  06/09/2022   • IR NEPHROSTOMY TUBE CHECK/CHANGE/REPOSITION/REINSERTION/UPSIZE  05/27/2022   • IR NEPHROSTOMY TUBE PLACEMENT  07/26/2019   • IR NEPHROURETERAL STENT CHECK/CHANGE/REPOSITION  04/06/2021   • IR NEPHROURETERAL STENT CHECK/CHANGE/REPOSITION  06/04/2021   • IR NEPHROURETERAL STENT CHECK/CHANGE/REPOSITION  09/03/2021   • IR NEPHROURETERAL STENT CHECK/CHANGE/REPOSITION  12/07/2021   • IR NEPHROURETERAL STENT CHECK/CHANGE/REPOSITION  03/08/2022   • IR NEPHROURETERAL STENT CHECK/CHANGE/REPOSITION  05/10/2022   • IR NEPHROURETERAL STENT CHECK/CHANGE/REPOSITION  9/19/2022   • IR PICC LINE  09/27/2019   • IR PORT PLACEMENT  07/26/2019   • IR PORT REMOVAL  09/20/2019   • OOPHORECTOMY Bilateral 2017   • AL INSJ TUNNELED CTR VAD W/SUBQ PORT AGE 7 YR/> Left 11/12/2019    Procedure: INSERTION VENOUS PORT ( PORT-A-CATH) IR;  Surgeon: Tanner Juárez DO;  Location: [facility];  Service: Interventional Radiology   • AL LAP, RADICAL HYST W/ TUBE&OV, NODE BX N/A 12/19/2017    Procedure: HYSTERECTOMY LAPAROSCOPIC TOTAL ([address]) W/ ROBOTICS; BILATERAL SALPINGOOPHERECTOMY; LYMPH NODE DISSECTION; lysis of adhesions;  Surgeon: Jessica Wright MD;  Location: [facility];  Service: Gynecology Oncology   • NE LAP,DIAGNOSTIC ABDOMEN N/A 12/19/2017    Procedure: LAPAROSCOPY DIAGNOSTIC;  Surgeon: Jessica Wright MD;  Location: [facility];  Service: Gynecology Oncology   • TONSILLECTOMY     • US GUIDED BREAST BIOPSY RIGHT COMPLETE Right 06/28/2019       ALLERGIES:  Allergies   Allergen Reactions   • Cephalosporins Rash     Which Cephalosporin reaction was to not specified; however, has tolerated Amoxicillin, Cefazolin, and Cefepime       SOCIAL HISTORY:  Social History     Substance and Sexual Activity   Alcohol Use Not Currently     Social History     Substance and Sexual Activity   Drug Use No     Social History     Tobacco Use   Smoking Status Never Smoker   Smokeless Tobacco Never Used       FAMILY HISTORY:  Family History   Problem Relation Age of Onset   • Other Mother         dyslipidemia   • Ovarian cancer Mother 48   • Lymphoma Father    • Breast cancer Sister 61   • No Known Problems Maternal Grandmother    • Bone cancer Maternal Grandfather    • Uterine cancer Paternal Grandmother    • No Known Problems Paternal Grandfather    • No Known Problems Brother    • No Known Problems Brother    • No Known Problems Maternal Aunt    • No Known Problems Maternal Aunt    • No Known Problems Maternal Aunt    • No Known Problems Maternal Aunt    • No Known Problems Paternal Aunt    • No Known Problems Paternal Aunt    • No Known Problems Paternal Aunt    • No Known Problems Paternal Aunt        MEDICATIONS:    Current Outpatient Medications:   •  ARIPiprazole (ABILIFY) 20 MG tablet, Take 20 mg by mouth in the morning , Disp: , Rfl:   •  aspirin (ECOTRIN LOW STRENGTH) 81 mg EC tablet, Take 81 mg by mouth daily, Disp: , Rfl:   •  BD PosiFlush 0 9 % SOLN, , Disp: , Rfl:   •  Calcium-Magnesium-Vitamin D (CALCIUM 500 PO), Take by mouth daily , Disp: , Rfl:   •  cholecalciferol (VITAMIN D3) 1,000 units tablet, Take 1,000 Units by mouth daily, Disp: , Rfl:   •  Cranberry-Vitamin C 250-60 MG CAPS, Take 1 tablet by mouth in the morning, Disp: 90 capsule, Rfl: 3  •  Cyanocobalamin (VITAMIN B12 PO), Take by mouth daily , Disp: , Rfl:   •  dronabinol (MARINOL) 2 5 mg capsule, Take 1 capsule (2 5 mg total) by mouth 2 (two) times a day before meals, Disp: 30 capsule, Rfl: 0  •  enoxaparin (LOVENOX) 80 mg/0 8 mL, Inject 0 8 mL (80 mg total) under the skin every 24 hours, Disp: 72 mL, Rfl: 1  •  estradiol (ESTRACE VAGINAL) 0 1 mg/g vaginal cream, Insert 1g nightly for 6 weeks, then 1-2x weekly  , Disp: 42 5 g, Rfl: 1  •  folic acid (FOLVITE) 1 mg tablet, Take 1 tablet (1 mg total) by mouth daily, Disp: , Rfl: 0  •  gemfibrozil (LOPID) 600 mg tablet, TAKE 1 TABLET BY MOUTH EVERY DAY, Disp: 90 tablet, Rfl: 0  •  lisinopril (ZESTRIL) 5 mg tablet, Take 1 tablet (5 mg total) by mouth daily, Disp: 90 tablet, Rfl: 3  •  LORazepam (ATIVAN) 0 5 mg tablet, Take 1 tablet (0 5 mg total) by mouth every 6 (six) hours as needed for anxiety (or nausea), Disp: 36 tablet, Rfl: 1  •  methylprednisolone (MEDROL) 4 mg tablet, Take 1 tablet (4 mg total) by mouth in the morning Take 2 tables once a day for 5 days, take 1 tablet once a day for 5 days, take 1 tablet every other day for 14 days  , Disp: 21 tablet, Rfl: 0  •  Multiple Vitamin (MULTIVITAMIN) tablet, Take 1 tablet by mouth daily, Disp: , Rfl:   •  naloxone (NARCAN) 4 mg/0 1 mL nasal spray, Administer 1 spray into a nostril   If no response after 2-3 minutes, give another dose in the other nostril using a new spray , Disp: 1 each, Rfl: 0  •  omeprazole (PriLOSEC) 20 mg delayed release capsule, Take 20 mg by mouth daily, Disp: , Rfl:   •  ondansetron (ZOFRAN) 8 mg tablet, Take 1 tablet (8 mg total) by mouth every 8 (eight) hours as needed for nausea or vomiting, Disp: 30 tablet, Rfl: 1  •  oxybutynin (DITROPAN XL) 15 MG 24 hr tablet, Take 1 tablet (15 mg total) by mouth daily at bedtime, Disp: 90 tablet, Rfl: 3  •  rucaparib (RUBRACA) 300 mg tablet, Take 600 mg by mouth every 12 (twelve) hours Take with or without food  Do not repeat a vomited dose , Disp: , Rfl:   •  saccharomyces boulardii (FLORASTOR) 250 mg capsule, Take 1 capsule (250 mg total) by mouth 2 (two) times a day, Disp: , Rfl: 0  •  senna (SENOKOT) 8 6 MG tablet, Take 1 tablet (8 6 mg total) by mouth 2 (two) times a day as needed for constipation, Disp: 60 tablet, Rfl: 0  •  sodium chloride, PF, 0 9 %, 10 mL by Intracatheter route daily, Disp: 300 mL, Rfl: 3  •  venlafaxine (EFFEXOR) 75 mg tablet, Take 75 mg by mouth 3 (three) times a day  , Disp: , Rfl:   •  Vibegron 75 MG TABS, Take 75 mg by mouth in the morning, Disp: 30 tablet, Rfl: 3  •  clotrimazole-betamethasone (LOTRISONE) 1-0 05 % cream, Apply topically 2 (two) times a day (Patient not taking: No sig reported), Disp: 30 g, Rfl: 0  No current facility-administered medications for this visit  REVIEW OF SYSTEMS:  Review of Systems   Constitutional: Negative for chills and fever  HENT: Negative for ear pain and sore throat  Eyes: Negative for pain and visual disturbance  Respiratory: Negative for cough and shortness of breath  Cardiovascular: Negative for chest pain and palpitations  Gastrointestinal: Negative for abdominal pain and vomiting  Genitourinary: Negative for dysuria and hematuria  Musculoskeletal: Negative for arthralgias and back pain  Skin: Negative for color change and rash  Neurological: Negative for seizures and syncope  All other systems reviewed and are negative  All the systems were reviewed and were negative except as documented on the HPI  PHYSICAL EXAMINATION:  /80   Pulse 80   Ht 4' 11" (1 499 m)   Wt 56 2 kg (123 lb 12 8 oz)   LMP  (LMP Unknown)   BMI 25 00 kg/m²   Current Weight: Weight - Scale: 56 2 kg (123 lb 12 8 oz) Body mass index is 25 kg/m²    Physical Exam  Constitutional: Appearance: Normal appearance  HENT:      Head: Normocephalic and atraumatic  Mouth/Throat:      Mouth: Mucous membranes are moist       Pharynx: Oropharynx is clear  Cardiovascular:      Rate and Rhythm: Normal rate and regular rhythm  Pulmonary:      Effort: Pulmonary effort is normal       Breath sounds: Normal breath sounds  Abdominal:      General: Abdomen is flat  Bowel sounds are normal       Palpations: Abdomen is soft  Musculoskeletal:         General: Normal range of motion  Right lower leg: No edema  Left lower leg: No edema  Skin:     General: Skin is warm and dry  Neurological:      General: No focal deficit present  Mental Status: She is alert and oriented to person, place, and time  Mental status is at baseline  Psychiatric:         Mood and Affect: Mood normal          Behavior: Behavior normal          Thought Content:  Thought content normal          Judgment: Judgment normal        LABORATORY RESULTS:  Lab Results   Component Value Date     10/27/2017    K 3 2 (L) 10/21/2022     10/21/2022    CO2 23 10/21/2022    BUN 24 10/21/2022    CREATININE 1 05 10/21/2022    GLUCOSE 219 (H) 12/19/2017    GLUF 173 (H) 10/21/2022    CALCIUM 9 3 10/21/2022    CORRECTEDCA 9 4 07/29/2022    AST 10 (L) 10/21/2022    ALT 8 10/21/2022    ALKPHOS 112 (H) 10/21/2022    PROT 6 9 10/27/2017    BILITOT 0 3 10/27/2017    EGFR 55 10/21/2022     Lab Results   Component Value Date    WBC 6 41 10/21/2022    HGB 11 1 (L) 10/21/2022    HCT 34 9 10/21/2022    MCV 96 10/21/2022     10/21/2022     Lab Results   Component Value Date     6 (H) 11/19/2021    CALCIUM 9 3 10/21/2022    PHOS 3 5 10/21/2022

## 2022-10-27 NOTE — PATIENT INSTRUCTIONS
Start lisinopril 5 mg daily  Check BMP in 1 month  I have also ordered other blood work that you can do now to evaluate protein in your urine  We will see you back within 4-6 weeks

## 2022-10-28 ENCOUNTER — OFFICE VISIT (OUTPATIENT)
Dept: PHYSICAL THERAPY | Facility: CLINIC | Age: 67
End: 2022-10-28
Payer: COMMERCIAL

## 2022-10-28 DIAGNOSIS — S42.295D OTHER CLOSED NONDISPLACED FRACTURE OF PROXIMAL END OF LEFT HUMERUS WITH ROUTINE HEALING, SUBSEQUENT ENCOUNTER: Primary | ICD-10-CM

## 2022-10-28 PROCEDURE — 97140 MANUAL THERAPY 1/> REGIONS: CPT

## 2022-10-28 PROCEDURE — 97010 HOT OR COLD PACKS THERAPY: CPT

## 2022-10-28 PROCEDURE — 97110 THERAPEUTIC EXERCISES: CPT

## 2022-10-28 NOTE — PROGRESS NOTES
Daily Note     Today's date: 10/28/2022  Patient name: Abdi Velasco  : 1955  MRN: [id number]  Referring provider: Donnie Palacios DO  Dx:   Encounter Diagnosis     ICD-10-CM    1  Other closed nondisplaced fracture of proximal end of left humerus with routine healing, subsequent encounter  S42 295D        Start Time: 1500  Stop Time: 1545  Total time in clinic (min): 45 minutes    Subjective: Patient reports she is tried from starting this round of chemo earlier this week  Objective: See treatment diary below      Assessment: Tolerated treatment well  Patient tolerated treatment session with good effort  No progression this visit as patient noted to have fatigue on arrival and some fatigue end of session  Performed exercises with good effort, with improving mobility noted with AAROM exercises  Patient exhibited good technique with therapeutic exercises and would benefit from continued PT      Plan: Continue per plan of care  Progress treatment as tolerated         Diagnosis: L proximal humerus fracture (DOI 2022)  Precautions: HTN, DM, hx of chemo, recent nephro stent (2022)  ( * astericks indicates given per HEP)    Manuals 10/4 10/7 10/12 10/14 10/17 10/19 10/26 10/28   STM DK DK DK DK DK DK DK DK   IASTM           PROM DK DK DK DK DK DK DK DK              Neuro Re-Ed           scap squeezes* 2x10 2x10         BWD shoulder rolls 2x10 2x10         Shoulder iso (4-way)   5" x10ea 5" x10ea 5" x10ea 5" x10ea 5" x10ea 5" x10ea   Pendulums*           Mid Rows      NV RTB 2x10 RTB 2x10   Shoulder Ext      NV RTB 2x10 RTB 2x10              Ther Ex           Elbow flex/ext* supine 2x10 supine 2x10 supine 2x10 Supine #1 2x10 Supine #1 2x10      physioball rollouts seated Seated green ball 2x10 Seated green ball 2x10         Table slides flex and ABD (standing) Flex 2x10  Scap 2x10, 3-5 sec hold ea Flex 2x10  Scap 2x10, 3" hold ea With p-ball FWD, scap 2x10ea With p-ball FWD, scap 2x10ea With p-ball FWD, scap 2x10ea      AAROM ER with cane supine 2x10 supine 2x10 supine 2x10 supine 2x10 supine 2x10 Seated 2x10 Seated 2x10 Seated 2x10   AAROM flexion with cane  Supine x10 supine 2x10 supine 2x10 supine 2x10 supine 2x10 supine 2x10 supine 2x10   AAROM ABD with cane       Standing x15 Standing x15   Wall slides with towel     Flex x10 Flex x10 Flex x15 Flex x15              Ther Activity           Gripping Green 30" x2 Blue 30" x2                               Pt Ed           Re-Evaluation      DK     Modalities           Heat/ice (prn) Ice x8' Ice x8' Ice x8' Ice x8' Ice x8' Ice x7' Ice x8' Ice x8'

## 2022-10-31 ENCOUNTER — OFFICE VISIT (OUTPATIENT)
Dept: PHYSICAL THERAPY | Facility: CLINIC | Age: 67
End: 2022-10-31

## 2022-10-31 ENCOUNTER — TRANSCRIBE ORDERS (OUTPATIENT)
Dept: LAB | Facility: CLINIC | Age: 67
End: 2022-10-31

## 2022-10-31 ENCOUNTER — APPOINTMENT (OUTPATIENT)
Dept: LAB | Facility: CLINIC | Age: 67
End: 2022-10-31

## 2022-10-31 DIAGNOSIS — S42.295D OTHER CLOSED NONDISPLACED FRACTURE OF PROXIMAL END OF LEFT HUMERUS WITH ROUTINE HEALING, SUBSEQUENT ENCOUNTER: Primary | ICD-10-CM

## 2022-10-31 DIAGNOSIS — I42.0 DILATED CARDIOMYOPATHY SECONDARY TO MALIGNANCY (HCC): Primary | ICD-10-CM

## 2022-10-31 DIAGNOSIS — C80.1 DILATED CARDIOMYOPATHY SECONDARY TO MALIGNANCY (HCC): Primary | ICD-10-CM

## 2022-10-31 DIAGNOSIS — C80.1 DILATED CARDIOMYOPATHY SECONDARY TO MALIGNANCY (HCC): ICD-10-CM

## 2022-10-31 DIAGNOSIS — I42.0 DILATED CARDIOMYOPATHY SECONDARY TO MALIGNANCY (HCC): ICD-10-CM

## 2022-10-31 DIAGNOSIS — N18.31 STAGE 3A CHRONIC KIDNEY DISEASE (HCC): ICD-10-CM

## 2022-10-31 DIAGNOSIS — R80.9 PROTEINURIA, UNSPECIFIED TYPE: ICD-10-CM

## 2022-10-31 LAB
25(OH)D3 SERPL-MCNC: 25.4 NG/ML (ref 30–100)
C3 SERPL-MCNC: 151 MG/DL (ref 90–180)
C4 SERPL-MCNC: 59 MG/DL (ref 10–40)
ERYTHROCYTE [DISTWIDTH] IN BLOOD BY AUTOMATED COUNT: 13.6 % (ref 11.6–15.1)
EST. AVERAGE GLUCOSE BLD GHB EST-MCNC: 103 MG/DL
HBA1C MFR BLD: 5.2 %
HBV CORE AB SER QL: NORMAL
HBV CORE IGM SER QL: NORMAL
HBV SURFACE AG SER QL: NORMAL
HCT VFR BLD AUTO: 38.1 % (ref 34.8–46.1)
HCV AB SER QL: NORMAL
HGB BLD-MCNC: 11.8 G/DL (ref 11.5–15.4)
MCH RBC QN AUTO: 30.4 PG (ref 26.8–34.3)
MCHC RBC AUTO-ENTMCNC: 31 G/DL (ref 31.4–37.4)
MCV RBC AUTO: 98 FL (ref 82–98)
PLATELET # BLD AUTO: 377 THOUSANDS/UL (ref 149–390)
PMV BLD AUTO: 11 FL (ref 8.9–12.7)
PTH-INTACT SERPL-MCNC: 160.9 PG/ML (ref 18.4–80.1)
RBC # BLD AUTO: 3.88 MILLION/UL (ref 3.81–5.12)
WBC # BLD AUTO: 8.47 THOUSAND/UL (ref 4.31–10.16)

## 2022-10-31 NOTE — PROGRESS NOTES
Daily Note     Today's date: 10/31/2022  Patient name: Khang Izaguirre  : 1955  MRN: [id number]  Referring provider: Lance Byrd DO  Dx:   Encounter Diagnosis     ICD-10-CM    1  Other closed nondisplaced fracture of proximal end of left humerus with routine healing, subsequent encounter  S42 295D        Start Time: 1230  Stop Time: 1310  Total time in clinic (min): 40 minutes    Subjective: Patient reports she feels very tired today from the chemo  Objective: See treatment diary below      Assessment: Tolerated treatment well  Patient continues to have limited scaption/ABD mobility, that affects overhead and lateral reaching ability  Patient performed treatment exercises with good effort, but deferred mid rows and shoulder extension exercises today due to reports of fatigue  Patient exhibited good technique with therapeutic exercises and would benefit from continued PT      Plan: Continue per plan of care  Progress treatment as tolerated         Diagnosis: L proximal humerus fracture (DOI 2022)  Precautions: HTN, DM, hx of chemo, recent nephro stent (2022)  ( * astericks indicates given per HEP)    Manuals 10/17 10/19 10/26 10/28 10/31   STM DK DK DK DK DK   IASTM        PROM DK DK DK DK DK           Neuro Re-Ed        scap squeezes*        BWD shoulder rolls        Shoulder iso (4-way) 5" x10ea 5" x10ea 5" x10ea 5" x10ea 5" x10ea   Pendulums*        Mid Rows  NV RTB 2x10 RTB 2x10 NV   Shoulder Ext  NV RTB 2x10 RTB 2x10 NV           Ther Ex        Elbow flex/ext* Supine #1 2x10       physioball rollouts seated        Table slides flex and ABD (standing) With p-ball FWD, scap 2x10ea       AAROM ER with cane supine 2x10 Seated 2x10 Seated 2x10 Seated 2x10 Seated 2x10   AAROM flexion with cane supine 2x10 supine 2x10 supine 2x10 supine 2x10 supine 2x10   AAROM ABD with cane   Standing x15 Standing x15 Standing x15   Wall slides with pillow case Flex x10 Flex x10 Flex x15 Flex x15 Flex x15, scap x10           Ther Activity        Gripping                        Pt Ed        Re-Evaluation  DK      Modalities        Heat/ice (prn) Ice x8' Ice x7' Ice x8' Ice x8' Ice x8'

## 2022-11-01 ENCOUNTER — TELEPHONE (OUTPATIENT)
Dept: NEPHROLOGY | Facility: CLINIC | Age: 67
End: 2022-11-01

## 2022-11-01 DIAGNOSIS — E55.9 VITAMIN D INSUFFICIENCY: Primary | ICD-10-CM

## 2022-11-01 LAB
ALBUMIN SERPL ELPH-MCNC: 4.24 G/DL (ref 3.5–5)
ALBUMIN SERPL ELPH-MCNC: 58.1 % (ref 52–65)
ALPHA1 GLOB SERPL ELPH-MCNC: 0.48 G/DL (ref 0.1–0.4)
ALPHA1 GLOB SERPL ELPH-MCNC: 6.6 % (ref 2.5–5)
ALPHA2 GLOB SERPL ELPH-MCNC: 0.97 G/DL (ref 0.4–1.2)
ALPHA2 GLOB SERPL ELPH-MCNC: 13.3 % (ref 7–13)
BETA GLOB ABNORMAL SERPL ELPH-MCNC: 0.4 G/DL (ref 0.4–0.8)
BETA1 GLOB SERPL ELPH-MCNC: 5.5 % (ref 5–13)
BETA2 GLOB SERPL ELPH-MCNC: 5.9 % (ref 2–8)
BETA2+GAMMA GLOB SERPL ELPH-MCNC: 0.43 G/DL (ref 0.2–0.5)
DSDNA AB SER-ACNC: 2 IU/ML (ref 0–9)
GAMMA GLOB ABNORMAL SERPL ELPH-MCNC: 0.77 G/DL (ref 0.5–1.6)
GAMMA GLOB SERPL ELPH-MCNC: 10.6 % (ref 12–22)
HIV 1+2 AB+HIV1 P24 AG SERPL QL IA: NORMAL
IGG/ALB SER: 1.39 {RATIO} (ref 1.1–1.8)
KAPPA LC FREE SER-MCNC: 55.3 MG/L (ref 3.3–19.4)
KAPPA LC FREE/LAMBDA FREE SER: 1.54 {RATIO} (ref 0.26–1.65)
LAMBDA LC FREE SERPL-MCNC: 36 MG/L (ref 5.7–26.3)
PROT PATTERN SERPL ELPH-IMP: ABNORMAL
PROT SERPL-MCNC: 7.3 G/DL (ref 6.4–8.2)

## 2022-11-01 RX ORDER — ERGOCALCIFEROL 1.25 MG/1
50000 CAPSULE ORAL WEEKLY
Qty: 6 CAPSULE | Refills: 0 | Status: SHIPPED | OUTPATIENT
Start: 2022-11-01 | End: 2022-12-07

## 2022-11-01 NOTE — TELEPHONE ENCOUNTER
Patient seen for nephrotic range proteinuria  Workup so far including complements, double-stranded DNA, hepatitis-B/hepatitis-C, HIV serologies normal/negative  Hemoglobin A1c 5 2  SPEP/serum free light chain/MARY ANNE 2R antibody pending  Most likely cause of worsening proteinuria is use of Avastin  Discussed with Gyn-Oncology and they have planned to put Avastin on hold for now  We will continue to monitor proteinuria  If proteinuria continues to improve and goes down to less than 1 g would not pursue further workup  If proteinuria persists she may need a kidney biopsy because she had proteinuria even before starting Avastin  She has an elevated PTH but also has vitamin-D insufficiency with level of around 25 despite being on vitamin-D supplementation with 2000 units daily  Most likely cause for elevated PTH is likely secondary hyperparathyroidism from vitamin-D insufficiency  Advised to take ergocalciferol 50,000 units weekly for 6 weeks and then resume 2000 units daily  Most of the serological workup came back negative  CHERI elevated, 1:80, double-stranded DNA negative, no hypocomplementemia  Patient does not have clinical evidence of lupus  Continue to follow proteinuria with Avastin now being on hold      Lab Results   Component Value Date     9 (H) 10/31/2022    CALCIUM 9 3 10/21/2022    PHOS 3 5 10/21/2022

## 2022-11-02 LAB
ANA HOMOGEN SER QL IF: NORMAL
ANA HOMOGEN TITR SER: NORMAL {TITER}
PLA2R IGG SER IA-ACNC: <1.8 RU/ML (ref 0–19.9)
RYE IGE QN: POSITIVE

## 2022-11-04 ENCOUNTER — OFFICE VISIT (OUTPATIENT)
Dept: PHYSICAL THERAPY | Facility: CLINIC | Age: 67
End: 2022-11-04

## 2022-11-04 DIAGNOSIS — S42.295D OTHER CLOSED NONDISPLACED FRACTURE OF PROXIMAL END OF LEFT HUMERUS WITH ROUTINE HEALING, SUBSEQUENT ENCOUNTER: Primary | ICD-10-CM

## 2022-11-04 NOTE — PROGRESS NOTES
Daily Note     Today's date: 2022  Patient name: Magi Tinajero  : 1955  MRN: [id number]  Referring provider: Kym Hurd DO  Dx:   Encounter Diagnosis     ICD-10-CM    1  Other closed nondisplaced fracture of proximal end of left humerus with routine healing, subsequent encounter  S42 295D        Start Time: 1300  Stop Time: 1345  Total time in clinic (min): 45 minutes    Subjective: Patient reports she feels better today  She reports she is able to move her arm better than previous sessions  Objective: See treatment diary below      Assessment: Tolerated treatment well  Patient with slightly improving PROM mobility  Slightly improving tolerance with AAROM  Able to perform scapular strengthening exercises with good form  Added shoulder pulleys to further progress shoulder mobility within pain-free ROM  Patient exhibited good technique with therapeutic exercises and would benefit from continued PT      Plan: Continue per plan of care  Progress treatment as tolerated         Diagnosis: L proximal humerus fracture (DOI 2022)  Precautions: HTN, DM, hx of chemo, recent nephro stent (2022)  ( * astericks indicates given per HEP)    Manuals 10/17 10/19 10/26 10/28 10/31 11/4   STM DK DK DK DK DK DK   IASTM         PROM DK DK DK DK DK DK            Neuro Re-Ed         scap squeezes*         BWD shoulder rolls         Shoulder iso (4-way) 5" x10ea 5" x10ea 5" x10ea 5" x10ea 5" x10ea 5" x10ea   Pendulums*         Mid Rows  NV RTB 2x10 RTB 2x10 NV RTB 2x10   Shoulder Ext  NV RTB 2x10 RTB 2x10 NV RTB 2x10            Ther Ex         Elbow flex/ext* Supine #1 2x10        physioball rollouts seated         Table slides flex and ABD (standing) With p-ball FWD, scap 2x10ea        AAROM ER with cane supine 2x10 Seated 2x10 Seated 2x10 Seated 2x10 Seated 2x10 Seated 2x10   AAROM flexion with cane supine 2x10 supine 2x10 supine 2x10 supine 2x10 supine 2x10 supine 2x10   AAROM ABD with cane   Standing x15 Standing x15 Standing x15 Standing x15   Wall slides with pillow case Flex x10 Flex x10 Flex x15 Flex x15 Flex x15, scap x10 Flex x15, scap x10   Pulleys      Seated flex x10, scap x10            Ther Activity         Gripping                           Pt Ed         Re-Evaluation  DK       Modalities         Heat/ice (prn) Ice x8' Ice x7' Ice x8' Ice x8' Ice x8' Ice x6'

## 2022-11-07 ENCOUNTER — APPOINTMENT (OUTPATIENT)
Dept: PHYSICAL THERAPY | Facility: CLINIC | Age: 67
End: 2022-11-07

## 2022-11-09 ENCOUNTER — OFFICE VISIT (OUTPATIENT)
Dept: PHYSICAL THERAPY | Facility: CLINIC | Age: 67
End: 2022-11-09

## 2022-11-09 DIAGNOSIS — S42.295D OTHER CLOSED NONDISPLACED FRACTURE OF PROXIMAL END OF LEFT HUMERUS WITH ROUTINE HEALING, SUBSEQUENT ENCOUNTER: Primary | ICD-10-CM

## 2022-11-09 NOTE — PROGRESS NOTES
Daily Note     Today's date: 2022  Patient name: Yumi Castillo  : 1955  MRN: [id number]  Referring provider: Ruchi Love DO  Dx:   Encounter Diagnosis     ICD-10-CM    1  Other closed nondisplaced fracture of proximal end of left humerus with routine healing, subsequent encounter  S42 [id number]                   Subjective: Patient reports some increased shoulder pain today  States she thinks it may be due to missing last PT session; denies any changes in activity  Patient also reports stiffness pre treatment session  Objective: See treatment diary below      Assessment: Tolerated treatment well  Patient tolerated treatment progression well this visit without adverse effects or increase in pain  She does tend to compensate with L UT during AAROM shoulder elevation  Fair carryover following verbal cueing to prevent UT substitution  Reports improvements in stiffness following treatment session  Patient exhibited good technique with therapeutic exercises and would benefit from continued PT      Plan: Continue per plan of care        Diagnosis: L proximal humerus fracture (DOI 2022)  Precautions: HTN, DM, hx of chemo, recent nephro stent (2022)  ( * astericks indicates given per HEP)    Manuals 11/9 10/19 10/26 10/28 10/31 11/4   STM  DK DK DK DK DK   IASTM         PROM SD DK DK DK DK DK            Neuro Re-Ed         scap squeezes*         BWD shoulder rolls         Shoulder iso (4-way) 5s x 10 ea 5" x10ea 5" x10ea 5" x10ea 5" x10ea 5" x10ea   Pendulums*         Mid Rows 2x10 RTB NV RTB 2x10 RTB 2x10 NV RTB 2x10   Shoulder Ext 2x10 RTB NV RTB 2x10 RTB 2x10 NV RTB 2x10            Ther Ex         Elbow flex/ext*         physioball rollouts seated         Table slides flex and ABD (standing)         AAROM ER with cane Seated 2x10 5s Seated 2x10 Seated 2x10 Seated 2x10 Seated 2x10 Seated 2x10   AAROM flexion with cane Supine 2x10 5s supine 2x10 supine 2x10 supine 2x10 supine 2x10 supine 2x10 AAROM ABD with cane Standing 2x10  Standing x15 Standing x15 Standing x15 Standing x15   Wall slides with pillow case Flex x15  Scap x 10 Flex x10 Flex x15 Flex x15 Flex x15, scap x10 Flex x15, scap x10   Pulleys Seated flex + scap 5s x 15     Seated flex x10, scap x10            Ther Activity         Gripping                           Pt Ed         Re-Evaluation  DK       Modalities         Heat/ice (prn)  Ice x7' Ice x8' Ice x8' Ice x8' Ice x6'

## 2022-11-10 DIAGNOSIS — C54.1 ENDOMETRIAL CANCER (HCC): Primary | ICD-10-CM

## 2022-11-11 ENCOUNTER — APPOINTMENT (OUTPATIENT)
Dept: LAB | Facility: HOSPITAL | Age: 67
End: 2022-11-11
Attending: OBSTETRICS & GYNECOLOGY

## 2022-11-11 ENCOUNTER — OFFICE VISIT (OUTPATIENT)
Dept: GYNECOLOGIC ONCOLOGY | Facility: CLINIC | Age: 67
End: 2022-11-11

## 2022-11-11 ENCOUNTER — HOSPITAL ENCOUNTER (OUTPATIENT)
Dept: INFUSION CENTER | Facility: HOSPITAL | Age: 67
End: 2022-11-11

## 2022-11-11 ENCOUNTER — DOCUMENTATION (OUTPATIENT)
Dept: OTHER | Facility: HOSPITAL | Age: 67
End: 2022-11-11

## 2022-11-11 VITALS
HEIGHT: 59 IN | BODY MASS INDEX: 24.39 KG/M2 | WEIGHT: 121 LBS | TEMPERATURE: 97.7 F | DIASTOLIC BLOOD PRESSURE: 78 MMHG | HEART RATE: 70 BPM | RESPIRATION RATE: 16 BRPM | SYSTOLIC BLOOD PRESSURE: 118 MMHG

## 2022-11-11 DIAGNOSIS — C54.1 ENDOMETRIAL CANCER (HCC): Primary | ICD-10-CM

## 2022-11-11 DIAGNOSIS — C54.1 ENDOMETRIAL CANCER (HCC): Primary | Chronic | ICD-10-CM

## 2022-11-11 LAB
ALBUMIN SERPL BCP-MCNC: 3 G/DL (ref 3.5–5)
ALP SERPL-CCNC: 147 U/L (ref 46–116)
ALT SERPL W P-5'-P-CCNC: 12 U/L (ref 12–78)
AMYLASE SERPL-CCNC: 27 IU/L (ref 25–115)
ANION GAP SERPL CALCULATED.3IONS-SCNC: 7 MMOL/L (ref 4–13)
APTT PPP: 34 SECONDS (ref 23–37)
AST SERPL W P-5'-P-CCNC: 9 U/L (ref 5–45)
BACTERIA UR QL AUTO: ABNORMAL /HPF
BASOPHILS # BLD AUTO: 0.04 THOUSANDS/ÂΜL (ref 0–0.1)
BASOPHILS NFR BLD AUTO: 1 % (ref 0–1)
BILIRUB SERPL-MCNC: 0.31 MG/DL (ref 0.2–1)
BILIRUB UR QL STRIP: NEGATIVE
BUN SERPL-MCNC: 26 MG/DL (ref 5–25)
CALCIUM ALBUM COR SERPL-MCNC: 10.5 MG/DL (ref 8.3–10.1)
CALCIUM SERPL-MCNC: 9.7 MG/DL (ref 8.3–10.1)
CHLORIDE SERPL-SCNC: 107 MMOL/L (ref 96–108)
CHOLEST SERPL-MCNC: 158 MG/DL
CLARITY UR: ABNORMAL
CO2 SERPL-SCNC: 22 MMOL/L (ref 21–32)
COLOR UR: YELLOW
CREAT SERPL-MCNC: 1.43 MG/DL (ref 0.6–1.3)
EOSINOPHIL # BLD AUTO: 0.12 THOUSAND/ÂΜL (ref 0–0.61)
EOSINOPHIL NFR BLD AUTO: 2 % (ref 0–6)
ERYTHROCYTE [DISTWIDTH] IN BLOOD BY AUTOMATED COUNT: 13.5 % (ref 11.6–15.1)
GFR SERPL CREATININE-BSD FRML MDRD: 38 ML/MIN/1.73SQ M
GGT SERPL-CCNC: 9 U/L (ref 5–85)
GLUCOSE SERPL-MCNC: 114 MG/DL (ref 65–140)
GLUCOSE UR STRIP-MCNC: NEGATIVE MG/DL
HCT VFR BLD AUTO: 33.3 % (ref 34.8–46.1)
HGB BLD-MCNC: 10.5 G/DL (ref 11.5–15.4)
HGB UR QL STRIP.AUTO: ABNORMAL
HYALINE CASTS #/AREA URNS LPF: ABNORMAL /LPF
IMM GRANULOCYTES # BLD AUTO: 0.11 THOUSAND/UL (ref 0–0.2)
IMM GRANULOCYTES NFR BLD AUTO: 1 % (ref 0–2)
INR PPP: 0.93 (ref 0.84–1.19)
KETONES UR STRIP-MCNC: NEGATIVE MG/DL
LEUKOCYTE ESTERASE UR QL STRIP: ABNORMAL
LIPASE SERPL-CCNC: 99 U/L (ref 73–393)
LYMPHOCYTES # BLD AUTO: 0.66 THOUSANDS/ÂΜL (ref 0.6–4.47)
LYMPHOCYTES NFR BLD AUTO: 8 % (ref 14–44)
MAGNESIUM SERPL-MCNC: 1.9 MG/DL (ref 1.6–2.6)
MCH RBC QN AUTO: 30.2 PG (ref 26.8–34.3)
MCHC RBC AUTO-ENTMCNC: 31.5 G/DL (ref 31.4–37.4)
MCV RBC AUTO: 96 FL (ref 82–98)
MONOCYTES # BLD AUTO: 0.71 THOUSAND/ÂΜL (ref 0.17–1.22)
MONOCYTES NFR BLD AUTO: 9 % (ref 4–12)
MUCOUS THREADS UR QL AUTO: ABNORMAL
NEUTROPHILS # BLD AUTO: 6.32 THOUSANDS/ÂΜL (ref 1.85–7.62)
NEUTS SEG NFR BLD AUTO: 79 % (ref 43–75)
NITRITE UR QL STRIP: NEGATIVE
NON-SQ EPI CELLS URNS QL MICRO: ABNORMAL /HPF
NRBC BLD AUTO-RTO: 0 /100 WBCS
PH UR STRIP.AUTO: 6 [PH]
PHOSPHATE SERPL-MCNC: 3.7 MG/DL (ref 2.3–4.1)
PLATELET # BLD AUTO: 278 THOUSANDS/UL (ref 149–390)
PMV BLD AUTO: 9.5 FL (ref 8.9–12.7)
POTASSIUM SERPL-SCNC: 4.4 MMOL/L (ref 3.5–5.3)
PROT SERPL-MCNC: 8 G/DL (ref 6.4–8.4)
PROT UR STRIP-MCNC: ABNORMAL MG/DL
PROTHROMBIN TIME: 12.7 SECONDS (ref 11.6–14.5)
RBC # BLD AUTO: 3.48 MILLION/UL (ref 3.81–5.12)
RBC #/AREA URNS AUTO: ABNORMAL /HPF
SODIUM SERPL-SCNC: 136 MMOL/L (ref 135–147)
SP GR UR STRIP.AUTO: 1.02 (ref 1–1.03)
T3 SERPL-MCNC: 1 NG/ML (ref 0.6–1.8)
T4 FREE SERPL-MCNC: 1.18 NG/DL (ref 0.76–1.46)
TSH SERPL DL<=0.05 MIU/L-ACNC: 1.52 UIU/ML (ref 0.45–4.5)
UROBILINOGEN UR QL STRIP.AUTO: 0.2 E.U./DL
WBC # BLD AUTO: 7.96 THOUSAND/UL (ref 4.31–10.16)
WBC #/AREA URNS AUTO: ABNORMAL /HPF

## 2022-11-11 RX ORDER — AMOXICILLIN 500 MG/1
CAPSULE ORAL
COMMUNITY
Start: 2022-11-02

## 2022-11-11 NOTE — PROGRESS NOTES
Ragini Melendez (1955) is patient #[id number] on the [facility] clinical trial  Pt was seen for cycle 33 pre-treatment office visit and physical exam Woody Olvera PAC  Patient AE and Conmeds where reviewed  Patient states that she still has some mild fatigue but says it is most likely due to her recent dental work and her inability to eat well   her mouth sores have resolved  She states that she is on a new incontinence medication called vibegron 75mg and also started amoxicillin 500mg post dental procedure  No other changes to her medications  Nephrology discussed with patient that we continue to hold that Avastin to see if she continues to have improvement in her urine protien/cre ratio  Nicki De La Torre was in agreement with this as well  Patient did inform us that she will drop off her urine sample by the end of the day  Patient schedule was reviewed and will call with any questions or concerns

## 2022-11-11 NOTE — ASSESSMENT & PLAN NOTE
Recurrent stage IB endometrial cancer currently on ENDOBARR clinical trial presents  She has received 33 cycles  Rucaparib was held with cycle 28 d/t treatment-related fatigue and anemia  Her fatigue is stable and her mucositis has improved to grade 1  Imaging on 10/18/22 noted stable disease  Avastin held with cycle 30 due to [facility] ratio elevation, and continues to be held per discussion with nephrology      Labs from 11/11/22 reviewed   Proceed with next cycle of treatment as scheduled  Plan to continue to hold avastin and trend proteinuria      Return to the office per protocol

## 2022-11-11 NOTE — PROGRESS NOTES
Labs were reviewed and signed by Ricky LATIF   Per protocol, pt is okay to proceed with treatment for cycle 34 day 1 scheduled 14-Nov-2022  Patients Avastin will be held this cycle    Proceed with Atezolizumab and pill dispense for Rucaparib

## 2022-11-11 NOTE — PROGRESS NOTES
Central labs drawn, port flushed without incident  Pt unable to provide urine sample, labs slips given with specimen cup and pt will drop off at lab    Declined AVS but aware of next appt

## 2022-11-11 NOTE — PROGRESS NOTES
Assessment/Plan:    Problem List Items Addressed This Visit        Genitourinary    Endometrial cancer ([facility] ) - Primary (Chronic)     Recurrent stage IB endometrial cancer currently on ENDOBARR clinical trial presents  She has received 33 cycles  Rucaparib was held with cycle 28 d/t treatment-related fatigue and anemia  Her fatigue is stable and her mucositis has improved to grade 1  Imaging on 10/18/22 noted stable disease  Avastin held with cycle 30 due to [facility] ratio elevation, and continues to be held per discussion with nephrology      Labs from 11/11/22 reviewed  Proceed with next cycle of treatment as scheduled  Plan to continue to hold avastin and trend proteinuria      Return to the office per protocol                   CHIEF COMPLAINT:   F/U on clinical trial, pre-cycle 34    Problem:  Cancer Staging  Endometrial cancer [facility])  Staging form: Corpus Uteri - Carcinoma, AJCC 7th Edition  - Clinical stage from 12/19/2017: FIGO Stage IB (T1b, N0, M0) - Signed by John De La Vega MD on 2/12/2018        Previous therapy:  Oncology History   Endometrial cancer ([facility] )   11/17/2017 Initial Diagnosis    Endometrial cancer ([facility] )     11/17/2017 Biopsy    ENDOMETRIAL BIOPSY: WELL DIFFERENTIATED ENDOMETRIAL ADENOCARCINOMA (FIGO I) WITH FOCALMUCINOUS FEATURES  Part B: ENDOCERVICAL POLYP:BENIGN ENDOCERVICAL      12/19/2017 Surgery    Robotic assisted total laparoscopic hysterectomy with bilateral salpingo-oophorectomy and sentinel bilateral pelvic lymph node dissection  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 (4 4 x 3 2 cm tumor, 9 4/15 4mm invasion, NO LVSI, washings revealed atypical cellular changes)     12/19/2017 Genetic Testing    Morrison testing negative     6/28/2019 Biopsy    A  Breast, Right, US BX Right Breast 1000 4 cmfn:  - Benign breast tissue with focal histiocytic aggregate  See comment   - Negative for atypia and in-situ or invasive carcinoma       7/8/2019 Recurrence    Presented with right lower extremity DVT and CT demonstrating right pelvic sidewall mass with venous, ureteral and nerve compression causing significant neuropathic pain  Biopsy:  Lymph Node, Right pelvic lymph node x3:  - High-grade adenocarcinoma  7/30/2019 - 1/6/2020 Chemotherapy    Taxol 175 mg/m2 and carboplatin AUC 6 every 21 days  Dose was reduced to taxol 135 mg/m2 and carboplatin AUC 5  Completed 6 cycles  Treatment protracted due to multiple hospital admissions  2/24/2020 - 4/13/2020 Radiation    adjuvant external beam radiation therapy to the whole pelvis to 4500 cGy followed by boost to gross disease of an additional 2200 cGy     11/23/2020 Progression    New necrotic adenopathy in the retroperitoneum on CT      12/21/2020 -  Chemotherapy    Began chemotherapy with rucaparib, atezolizumab, and bevacizumab as per [facility] clinical trial            Patient ID: Peg Gross is a 74 y o  female  who presents to the office for follow-up on clinical trial  She is without new complaints  The patient has been afebrile  She notes her mouth sores have improved  She underwent oral surgery and she is recovering  The patient denies n/v/abdominal pain  Her PO intake is limited due to soft diet and oral surgery  Normal bowel/bladder function  She denies new SOB/cough, diarrhea, blood in stool, skin rash  Labs from 11/11/22 reviewed  UPC ratio pending  The following portions of the patient's history were reviewed and updated as appropriate: allergies, current medications, past medical history, past surgical history and problem list     Review of Systems   Constitutional: Positive for appetite change (due to oral surgery) and fatigue  Negative for fever  HENT: Negative  Eyes: Negative  Respiratory: Negative  Cardiovascular: Negative  Gastrointestinal: Negative  Genitourinary: Negative  Musculoskeletal: Negative  Skin: Negative  Neurological: Negative      Psychiatric/Behavioral: Negative  Current Outpatient Medications   Medication Sig Dispense Refill   • amoxicillin (AMOXIL) 500 mg capsule      • ARIPiprazole (ABILIFY) 20 MG tablet Take 20 mg by mouth in the morning  • aspirin (ECOTRIN LOW STRENGTH) 81 mg EC tablet Take 81 mg by mouth daily     • BD PosiFlush 0 9 % SOLN      • Calcium-Magnesium-Vitamin D (CALCIUM 500 PO) Take by mouth daily      • cholecalciferol (VITAMIN D3) 1,000 units tablet Take 1,000 Units by mouth daily     • Cranberry-Vitamin C 250-60 MG CAPS Take 1 tablet by mouth in the morning 90 capsule 3   • Cyanocobalamin (VITAMIN B12 PO) Take by mouth daily      • dronabinol (MARINOL) 2 5 mg capsule Take 1 capsule (2 5 mg total) by mouth 2 (two) times a day before meals 30 capsule 0   • enoxaparin (LOVENOX) 80 mg/0 8 mL Inject 0 8 mL (80 mg total) under the skin every 24 hours 72 mL 1   • ergocalciferol (VITAMIN D2) 50,000 units Take 1 capsule (50,000 Units total) by mouth once a week for 6 doses 6 capsule 0   • folic acid (FOLVITE) 1 mg tablet Take 1 tablet (1 mg total) by mouth daily  0   • gemfibrozil (LOPID) 600 mg tablet TAKE 1 TABLET BY MOUTH EVERY DAY 90 tablet 0   • lisinopril (ZESTRIL) 5 mg tablet Take 1 tablet (5 mg total) by mouth daily 90 tablet 3   • LORazepam (ATIVAN) 0 5 mg tablet Take 1 tablet (0 5 mg total) by mouth every 6 (six) hours as needed for anxiety (or nausea) 36 tablet 1   • Multiple Vitamin (MULTIVITAMIN) tablet Take 1 tablet by mouth daily     • omeprazole (PriLOSEC) 20 mg delayed release capsule Take 20 mg by mouth daily     • ondansetron (ZOFRAN) 8 mg tablet Take 1 tablet (8 mg total) by mouth every 8 (eight) hours as needed for nausea or vomiting 30 tablet 1   • oxybutynin (DITROPAN XL) 15 MG 24 hr tablet Take 1 tablet (15 mg total) by mouth daily at bedtime 90 tablet 3   • rucaparib (RUBRACA) 300 mg tablet Take 600 mg by mouth every 12 (twelve) hours Take with or without food  Do not repeat a vomited dose       • saccharomyces boulardii (FLORASTOR) 250 mg capsule Take 1 capsule (250 mg total) by mouth 2 (two) times a day  0   • senna (SENOKOT) 8 6 MG tablet Take 1 tablet (8 6 mg total) by mouth 2 (two) times a day as needed for constipation 60 tablet 0   • sodium chloride, PF, 0 9 % 10 mL by Intracatheter route daily 300 mL 3   • venlafaxine (EFFEXOR) 75 mg tablet Take 75 mg by mouth 3 (three) times a day       • Vibegron 75 MG TABS Take 75 mg by mouth in the morning 30 tablet 3   • clotrimazole-betamethasone (LOTRISONE) 1-0 05 % cream Apply topically 2 (two) times a day (Patient not taking: No sig reported) 30 g 0   • estradiol (ESTRACE VAGINAL) 0 1 mg/g vaginal cream Insert 1g nightly for 6 weeks, then 1-2x weekly  (Patient not taking: Reported on 11/11/2022) 42 5 g 1   • methylprednisolone (MEDROL) 4 mg tablet Take 1 tablet (4 mg total) by mouth in the morning Take 2 tables once a day for 5 days, take 1 tablet once a day for 5 days, take 1 tablet every other day for 14 days  (Patient not taking: Reported on 11/11/2022) 21 tablet 0   • naloxone (NARCAN) 4 mg/0 1 mL nasal spray Administer 1 spray into a nostril  If no response after 2-3 minutes, give another dose in the other nostril using a new spray  (Patient not taking: Reported on 11/11/2022) 1 each 0     No current facility-administered medications for this visit  Objective:    Blood pressure 118/78, pulse 70, temperature 97 7 °F (36 5 °C), temperature source Temporal, resp  rate 16, height 4' 11" (1 499 m), weight 54 9 kg (121 lb), not currently breastfeeding  Body mass index is 24 44 kg/m²  Body surface area is 1 49 meters squared  Physical Exam  Vitals reviewed  Constitutional:       General: She is not in acute distress  Appearance: Normal appearance  She is not ill-appearing  HENT:      Head: Normocephalic and atraumatic  Mouth/Throat:      Mouth: Mucous membranes are moist    Eyes:      General: No scleral icterus  Right eye: No discharge  Left eye: No discharge  Conjunctiva/sclera: Conjunctivae normal    Pulmonary:      Effort: Pulmonary effort is normal    Musculoskeletal:      Right lower leg: No edema  Left lower leg: No edema  Skin:     General: Skin is warm and dry  Coloration: Skin is not jaundiced  Findings: No rash  Neurological:      General: No focal deficit present  Mental Status: She is alert and oriented to person, place, and time  Cranial Nerves: No cranial nerve deficit  Sensory: No sensory deficit  Motor: No weakness  Gait: Gait normal    Psychiatric:         Mood and Affect: Mood normal          Behavior: Behavior normal          Thought Content: Thought content normal          Judgment: Judgment normal      Performance status is one         Lab Results   Component Value Date     10/27/2017    K 4 4 11/11/2022     11/11/2022    CO2 22 11/11/2022    BUN 26 (H) 11/11/2022    CREATININE 1 43 (H) 11/11/2022    GLUCOSE 219 (H) 12/19/2017    GLUF 173 (H) 10/21/2022    CALCIUM 9 7 11/11/2022    CORRECTEDCA 10 5 (H) 11/11/2022    AST 9 11/11/2022    ALT 12 11/11/2022    ALKPHOS 147 (H) 11/11/2022    PROT 6 9 10/27/2017    BILITOT 0 3 10/27/2017    EGFR 38 11/11/2022     Lab Results   Component Value Date    WBC 7 96 11/11/2022    HGB 10 5 (L) 11/11/2022    HCT 33 3 (L) 11/11/2022    MCV 96 11/11/2022     11/11/2022     Lab Results   Component Value Date    NEUTROABS 6 32 11/11/2022

## 2022-11-12 LAB
CREAT UR-MCNC: 56.8 MG/DL
PROT UR-MCNC: 235 MG/DL
PROT/CREAT UR: 4.14 MG/G{CREAT} (ref 0–0.1)

## 2022-11-14 ENCOUNTER — DOCUMENTATION (OUTPATIENT)
Dept: OTHER | Facility: HOSPITAL | Age: 67
End: 2022-11-14

## 2022-11-14 ENCOUNTER — HOSPITAL ENCOUNTER (OUTPATIENT)
Dept: INFUSION CENTER | Facility: CLINIC | Age: 67
Discharge: HOME | End: 2022-11-14

## 2022-11-14 VITALS
DIASTOLIC BLOOD PRESSURE: 65 MMHG | RESPIRATION RATE: 16 BRPM | BODY MASS INDEX: 24.44 KG/M2 | SYSTOLIC BLOOD PRESSURE: 92 MMHG | HEART RATE: 81 BPM | OXYGEN SATURATION: 100 % | HEIGHT: 59 IN | TEMPERATURE: 97.6 F | WEIGHT: 121.25 LBS

## 2022-11-14 RX ADMIN — Medication 1200 MG: at 09:23

## 2022-11-14 NOTE — PROGRESS NOTES
Pt resting with no complaints  Vitals stable; labs drawn and okay for treatment per Clinical Trials  Callbell within reach

## 2022-11-14 NOTE — PROGRESS NOTES
Ragini Melendez (1955) is patient#[id number] on the [facility] clinical trial  Pt was seen for treatment in the infusion center for cycle 34 on 11/14/2022  Patient was given Cycle 34 Rucaparib pills and drug diary  Patient returned cycle 33 drug and diary  Patient denied any changes to conmeds since office visit prior to infusion  Patient states she feels well overall  Patient Urinalysis was reviewed with Dr Rodriguez  We will continue to hold Avastin due to urine protein/ creatine ratio which was 4  15    Patient was told to call with any questions or concerns  Patient tolerated treatment without incident

## 2022-11-15 ENCOUNTER — OFFICE VISIT (OUTPATIENT)
Dept: PHYSICAL THERAPY | Facility: CLINIC | Age: 67
End: 2022-11-15

## 2022-11-15 DIAGNOSIS — S42.295D OTHER CLOSED NONDISPLACED FRACTURE OF PROXIMAL END OF LEFT HUMERUS WITH ROUTINE HEALING, SUBSEQUENT ENCOUNTER: Primary | ICD-10-CM

## 2022-11-15 NOTE — PROGRESS NOTES
Daily Note     Today's date: 11/15/2022  Patient name: Nell Mendez  : 1955  MRN: [id number]  Referring provider: Emelia Linder DO  Dx:   Encounter Diagnosis     ICD-10-CM    1  Other closed nondisplaced fracture of proximal end of left humerus with routine healing, subsequent encounter  S42 107D                   Subjective: Patient states she had chemotherapy yesterday and is feeling pretty tired  Pt states her shoulder is doing okay but she just feels off today  Objective: See treatment diary below      Assessment: Tolerated treatment fair as she is very fatigued today  Pt required rest breaks between exercises  Pt had decrease discomfort post manual PT  Patient would benefit from continued PT      Plan: Continue per plan of care        Diagnosis: L proximal humerus fracture (DOI 2022)  Precautions: HTN, DM, hx of chemo, recent nephro stent (2022)  ( * astericks indicates given per HEP)    Manuals 11/9 11/15    10/31 11   STM     DK DK   IASTM         PROM SD CF   DK DK            Neuro Re-Ed         scap squeezes*         BWD shoulder rolls         Shoulder iso (4-way) 5s x 10 ea nv    5" x10ea 5" x10ea   Pendulums*         Mid Rows 2x10 RTB nv   NV RTB 2x10   Shoulder Ext 2x10 RTB nv    NV RTB 2x10            Ther Ex         Elbow flex/ext*         physioball rollouts seated         Table slides flex and ABD (standing)         AAROM ER with cane Seated 2x10 5s Seated 2 x 10 x 5"    Seated 2x10 Seated 2x10   AAROM flexion with cane Supine 2x10 5s Supine   2 x 10 x 5"    supine 2x10 supine 2x10   AAROM ABD with cane Standing 2x10 nv   Standing x15 Standing x15   Wall slides with pillow case Flex x15  Scap x 10 Flex x10  Scap x 10   Flex x15, scap x10 Flex x15, scap x10   Pulleys Seated flex + scap 5s x 15 Seated flex + scap 5s x 15    Seated flex x10, scap x10            Ther Activity         Gripping                           Pt Ed         Re-Evaluation         Modalities Heat/ice (prn)     Ice x8' Ice x6'

## 2022-11-18 ENCOUNTER — EVALUATION (OUTPATIENT)
Dept: PHYSICAL THERAPY | Facility: CLINIC | Age: 67
End: 2022-11-18

## 2022-11-18 DIAGNOSIS — S42.295D OTHER CLOSED NONDISPLACED FRACTURE OF PROXIMAL END OF LEFT HUMERUS WITH ROUTINE HEALING, SUBSEQUENT ENCOUNTER: Primary | ICD-10-CM

## 2022-11-18 NOTE — PROGRESS NOTES
PT Re-Evaluation     Today's date: 2022  Patient name: Leisa Burr  : 1955  MRN: [id number]  Referring provider: Sharlene Salas DO  Dx:   Encounter Diagnosis     ICD-10-CM    1  Other closed nondisplaced fracture of proximal end of left humerus with routine healing, subsequent encounter  S42 295D           Start Time: 0900  Stop Time: 0945  Total time in clinic (min): 45 minutes    Assessment  Assessment details: Patient is a 74 y o  female who presents to outpatient PT IE s/p fall on 2022 with Left proximal humeral fracture  Patient is about 3 months post fracture at this time  Patient has attended PT for 17 visits thus far  Patient presents to PT re-evaluation with improving AROM and PROM since last assessed  Patient presents with overall reports of reduced pain levels and improved mobility of Left shoulder  Patient reports improved ability with self-care tasks such as dressing and bathing  Patient however reports continued difficulty with putting on her coat  Patient continues to have limitations with reaching behind her back and head, which affects UE dressing and hygiene activities  This affects her efficiency and independency with certain ADLs  Recently initiated strength training in PT, slowly progressing into concentric/eccentric training  Also progressing ROM exercises in PT to further improve overhead and lateral reaching ability  Patient was advised strongly to continue with self-stretches/AAROM exercises at home to assist with shoulder mobility in all directions for ADLs  Patient reports she has begun driving all distances and reports no difficulty at this time  Patient has reported improved ability with opening car door, which was her primary concern about 1 month ago  Patient has reported reduced Left shoulder pain while sleeping  Patient is Right-handed and is thus is able to assist and perform most activities with use of Right hand   Patient otherwise reports she is independent with ADLs and self-care tasks, however lives with her mother who assists her in certain other tasks and household chores  Patient however continues to have difficulty with using her Left hand to assist with ADLs, household chores, and house maintenance activities that require use of Left or both hands  Patient would benefit from continued skilled PT services to further improve her Left shoulder mobility, improve UE strength, improve postural mechanics, reduce pain levels, and progress overall functional mobility in order to perform ADLs and self-care tasks more efficiently and easily and to overall maximize function  Thank you for the referral     Impairments: abnormal muscle firing, abnormal or restricted ROM, abnormal movement, activity intolerance, impaired physical strength, lacks appropriate home exercise program, pain with function, scapular dyskinesis, poor posture  and poor body mechanics  Functional limitations: reaching overhead/laterally, lifting, opening car doors, UE dressingBarriers to therapy: Undergoing chemo, nephro stents, extensive medical hx  Understanding of Dx/Px/POC: good   Prognosis: good    Goals  Impairment Goals 4-6 weeks  In order to improve and maximize function patient will be able to     - Decrease pain intensity to <3/10  Ongoing  - Improve shoulder PROM to [facility] without pain throughout to improve flexibility of GHJ  Improving  - Improve shoulder AROM to [facility] without pain throughout to improve mobility of shoulder for varying reaching tasks  Improving    Functional Goals 6-8 weeks  In order to improve and maximize function patient will be able to     - Return to Prior Level of Function with no greater than 2/10 pain   - Increase Functional Status Measure (FOTO) to > = expected at Discharge  Partially Met  - Be independent and compliant with HEP  Partially Met  - Have the ROM necessary for performing dressing/bathing activities without difficulty   Partially Met, difficulty with putting on coat  - Sleep throughout the night without disturbances due to pain  Met  - Improve ability to lift/carry light objects overhead for better ease with household chores and ADLs  Ongoing  - Improve ability to open car door without difficulty or pain to improve ease with getting into car or opening other doors  Improving      Plan  Patient would benefit from: skilled PT  Planned modality interventions: cryotherapy  Other planned modality interventions: moist heat  Planned therapy interventions: joint mobilization, manual therapy, neuromuscular re-education, patient education, strengthening, stretching, therapeutic activities, therapeutic exercise, home exercise program, functional ROM exercises, postural training, body mechanics training, flexibility, Paz taping and massage  Frequency: 1-2x week  Duration in weeks: 4  Treatment plan discussed with: patient        Subjective Evaluation    History of Present Illness  Date of onset: 8/13/2022  Mechanism of injury: trauma  Mechanism of injury: Jacob Soni is a 74y o  year-old female who presents to outpatient PT s/p Left proximal humeral fracture that occurred on 8/13/2022  Patient reports she was home, she was wearing flipflops when her flipflop got stuck to a door  Patient reports she fell forward and onto Left shoulder  Patient reports she was able to get up by herself, and then called her sister  Patient did not want to go to ER, however was convinced by her sister to go on 8/15/2022  Patient received x-ray at the time that revealed proximal humeral fracture (see full report for details)  Patient was given sling and sent home  She had follow up on 8/30/2022 and received follow up x-rays again that stated fracture was healing well  Patient continues to be in sling at this time, with precautions for no strengthening for 6 weeks  Patient has follow up MD appointment on 10/4/2022   Patient is independent with most ADLs and self-care tasks, but requires assistance with putting her sling on  Patient is not driving at this time  Quality of life: good    Pain  Current pain ratin  At best pain ratin  At worst pain ratin  Location: L shoulder  Quality: dull ache  Relieving factors: medications and support  Aggravating factors: overhead activity and lifting  Progression: improved    Social Support  Lives with: mother  Hand dominance: right      Diagnostic Tests  X-ray: abnormal (8/15, : L shoulder)  Treatments  Previous treatment: immobilization  Current treatment: physical therapy  Patient Goals  Patient goals for therapy: decreased pain, increased motion, independence with ADLs/IADLs, return to sport/leisure activities and increased strength          Objective     Postural Observations  Seated posture: good  Standing posture: good    Additional Postural Observation Details  Left shoulder slightly guarded, slightly rounded/kyphotic thoracic posture    Palpation   Left   Muscle spasm in the anterior deltoid, middle deltoid and triceps  Tenderness of the anterior deltoid, middle deltoid and triceps  Tenderness     Left Shoulder   T[facility]ness [facility] joint, biceps tendon (proximal) and bicipital groove  Active Range of Motion   Left Shoulder   Flexion: 100 degrees   Extension: 60 degrees   Abduction: 80 degrees   External rotation 0°: 52 degrees   External rotation BTH: C7 (suboccipital)   Internal rotation BTB: L5 (glut)     Right Shoulder   Normal active range of motion    Passive Range of Motion   Left Shoulder   Flexion: 105 degrees   Abduction: 65 degrees   External rotation 0°: 50 degrees     Right Shoulder   Normal passive range of motion    Scapular Mobility   Left Shoulder   Scapular mobility: fair    Additional Scapular Mobility Details  Passive mobility with significant stiffness in distraction, upward and downward rotations      Joint Play   Left Shoulder  Hypomobile in the anterior capsule, posterior capsule and inferior capsule  Strength/Myotome Testing     Left Shoulder     Planes of Motion   Flexion: 3   Abduction: 3+   External rotation at 0°: 3+   Internal rotation at 0°: 3+     Isolated Muscles   Biceps: 4   Triceps: 3+     Right Shoulder   Normal muscle strength    Additional Strength Details  L shoulder MMT assessed at available AROM   Strength (Lv II in neutral): Left 20, 25, 20 lbs; Right 25, 20, 18 lbs       Subjective: Patient reports she has been feeing achy in her shoulder mainly because she is using it more  Patient reports improved mobility overall          Diagnosis: L proximal humerus fracture (DOI 8/13/2022)  Precautions: HTN, DM, hx of chemo, recent nephro stent (9/19/2022)  ( * astericks indicates given per HEP)    Manuals 11/9 11/15  11/18        STM   DK        IASTM           PROM SD CF DK                   Neuro Re-Ed           scap squeezes*           BWD shoulder rolls           Shoulder iso (4-way) 5s x 10 ea nv          Pendulums*           Mid Rows 2x10 RTB nv nv        Shoulder Ext 2x10 RTB nv  nv                   Ther Ex           Elbow flex/ext*           physioball rollouts seated           Table slides flex and ABD (standing)           AAROM ER with cane Seated 2x10 5s Seated 2 x 10 x 5"  Seated 2x10        AAROM flexion with cane Supine 2x10 5s Supine   2 x 10 x 5"  supine 2x10        AAROM ABD with cane Standing 2x10 nv Standing 2x10        Wall slides with pillow case Flex x15  Scap x 10 Flex x10  Scap x 10         Pulleys Seated flex + scap 5s x 15 Seated flex + scap 5s x 15 Seated flex + scap 5s x 15        IR stretch with strap   3" x10 standing                   Ther Activity           Gripping                                 Pt Ed           Re-Evaluation   DK        Modalities           Heat/ice (prn)

## 2022-11-22 ENCOUNTER — APPOINTMENT (OUTPATIENT)
Dept: PHYSICAL THERAPY | Facility: CLINIC | Age: 67
End: 2022-11-22

## 2022-11-25 ENCOUNTER — OFFICE VISIT (OUTPATIENT)
Dept: PHYSICAL THERAPY | Facility: CLINIC | Age: 67
End: 2022-11-25

## 2022-11-25 DIAGNOSIS — S42.295D OTHER CLOSED NONDISPLACED FRACTURE OF PROXIMAL END OF LEFT HUMERUS WITH ROUTINE HEALING, SUBSEQUENT ENCOUNTER: Primary | ICD-10-CM

## 2022-11-25 NOTE — PROGRESS NOTES
Daily Note     Today's date: 2022  Patient name: Amira Gerber  : 1955  MRN: [id number]  Referring provider: Ney Kwok DO  Dx:   Encounter Diagnosis     ICD-10-CM    1  Other closed nondisplaced fracture of proximal end of left humerus with routine healing, subsequent encounter  S42 295D           Start Time: 1330  Stop Time: 1410  Total time in clinic (min): 40 minutes    Subjective: Patient reports she has been feeling a little fatigued from the chemo and her appetite has changed  She reports she ate a full meal before coming for PT today  Objective: See treatment diary below      Assessment: Tolerated treatment well  Patient with improved tolerance for PROM of Left shoulder, but continues to have some limitations into flexion and ABD  Improved mobility with AAROM motions, with improved AROM into flexion and ABD noted end of session  Added web slide shoulder ER and IR strengthening with red TB  Performed exercises today with good tolerance and without increased pain  Patient reported feeling better end of session compared to on arrival  Patient exhibited good technique with therapeutic exercises and would benefit from continued PT      Plan: Continue per plan of care  Progress treament per protocol        Diagnosis: L proximal humerus fracture (DOI 2022)  Precautions: HTN, DM, hx of chemo, recent nephro stent (2022)  ( * astericks indicates given per HEP)    Manuals 11/9 11/15  11/18 11/25       STM   DK DK       IASTM           PROM SD CF DK DK                  Neuro Re-Ed           scap squeezes*           BWD shoulder rolls           Shoulder iso (4-way) 5s x 10 ea nv          Pendulums*           Mid Rows 2x10 RTB nv nv 2x10 RTB       Shoulder Ext 2x10 RTB nv  nv 2x10 RTB       Web Slide IR/ER    x15ea RTB                  Ther Ex           Elbow flex/ext*           physioball rollouts seated           Table slides flex and ABD (standing)           AAROM ER with cane Seated 2x10 5s Seated 2 x 10 x 5"  Seated 2x10 Seated 2x10       AAROM flexion with cane Supine 2x10 5s Supine   2 x 10 x 5"  supine 2x10 supine 2x10       AAROM ABD with cane Standing 2x10 nv Standing 2x10 Standing 2x10       Wall slides with pillow case Flex x15  Scap x 10 Flex x10  Scap x 10  Flex x15  Scap x 10       Pulleys Seated flex + scap 5s x 15 Seated flex + scap 5s x 15 Seated flex + scap 5s x 15 Seated flex + scap 5s x 15       IR stretch with strap   3" x10 standing                   Ther Activity           Gripping                                 Pt Ed           Re-Evaluation   DK        Modalities           Heat/ice (prn)

## 2022-11-28 ENCOUNTER — TELEPHONE (OUTPATIENT)
Dept: HEMATOLOGY ONCOLOGY | Facility: CLINIC | Age: 67
End: 2022-11-28

## 2022-11-28 ENCOUNTER — OFFICE VISIT (OUTPATIENT)
Dept: PHYSICAL THERAPY | Facility: CLINIC | Age: 67
End: 2022-11-28

## 2022-11-28 DIAGNOSIS — S42.295D OTHER CLOSED NONDISPLACED FRACTURE OF PROXIMAL END OF LEFT HUMERUS WITH ROUTINE HEALING, SUBSEQUENT ENCOUNTER: Primary | ICD-10-CM

## 2022-11-28 NOTE — PROGRESS NOTES
Outpatient Oncology Nutrition Consult  Type of Consult: Follow Up  Care Location: [facility]   Nutrition Assessment:  Oncology Diagnosis & Treatments:   Endometrial cancer  S/p surgery 12/19/17  Recurrence 7/8/19  S/p chemotherapy (carboplatin/taxol from 7/30/19-1/6/20)  S/p whole pelvis RT (2/4/20- 4/13/20)  Disease progression  ENDOBAR trial started 12/21/20      Oncology History   Endometrial cancer ([facility] [address] )   11/17/2017 Initial Diagnosis    Endometrial cancer ([facility] [address] )     11/17/2017 Biopsy    ENDOMETRIAL BIOPSY: WELL DIFFERENTIATED ENDOMETRIAL ADENOCARCINOMA (FIGO I) WITH FOCALMUCINOUS FEATURES  Part B: ENDOCERVICAL POLYP:BENIGN ENDOCERVICAL      12/19/2017 Surgery    Robotic assisted total laparoscopic hysterectomy with bilateral salpingo-oophorectomy and sentinel bilateral pelvic lymph node dissection  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 (4 4 x 3 2 cm tumor, 9 4/15 4mm invasion, NO LVSI, washings revealed atypical cellular changes)     12/19/2017 Genetic Testing    Morrison testing negative     6/28/2019 Biopsy    A  Breast, Right, US BX Right Breast 1000 4 cmfn:  - Benign breast tissue with focal histiocytic aggregate  See comment   - Negative for atypia and in-situ or invasive carcinoma  7/8/2019 Recurrence    Presented with right lower extremity DVT and CT demonstrating right pelvic sidewall mass with venous, ureteral and nerve compression causing significant neuropathic pain  Biopsy:  Lymph Node, Right pelvic lymph node x3:  - High-grade adenocarcinoma  7/30/2019 - 1/6/2020 Chemotherapy    Taxol 175 mg/m2 and carboplatin AUC 6 every 21 days  Dose was reduced to taxol 135 mg/m2 and carboplatin AUC 5  Completed 6 cycles  Treatment protracted due to multiple hospital admissions       2/24/2020 - 4/13/2020 Radiation    adjuvant external beam radiation therapy to the whole pelvis to 4500 cGy followed by boost to gross disease of an additional 2200 cGy     11/23/2020 Progression    New necrotic adenopathy in the retroperitoneum on CT      12/21/2020 -  Chemotherapy    Began chemotherapy with rucaparib, atezolizumab, and bevacizumab as per [facility] clinical trial     Rucaparib held cycle 28 secondary to fatigue  Avastin held since cycle 30 for elevated UPC  Treatment held for one cycle after cycle 31 for mucositis/fatigue         PMH: RNYGB (2001 at [facility])    Past Medical History:   Diagnosis Date   • Anemia    • Chemotherapy follow-up examination    • Depression    • Endometrial cancer ([facility] [address] ) 12/2017   • History of chemotherapy     started 12/2021endometrial cancer   • Hyperglycemia     vx type 2 dm -- last assessed 4/1/14; resolved 11/7/17   • Hyperlipidemia    • Hypertension    • Obesity     last assessed 10/14/17; resolved 11/7/17     Past Surgical History:   Procedure Laterality Date   • ABDOMINAL SURGERY      GASTRIC BYPASS   • BREAST BIOPSY Right 06/28/2019    core biopsy; benign   • CHOLECYSTECTOMY      at the time of gastric bypass   • COLONOSCOPY     • CT NEEDLE BIOPSY LYMPH NODE  07/08/2019   • FL GUIDED CENTRAL VENOUS ACCESS DEVICE INSERTION  11/12/2019   • GASTRIC BYPASS     • HYSTERECTOMY Bilateral 2017    total abdominal with salpingo-oophorectomy   • IR NEPHROSTOMY TO NEPHROURETERAL STENT  06/09/2022   • IR NEPHROSTOMY TUBE CHECK/CHANGE/REPOSITION/REINSERTION/UPSIZE  05/27/2022   • IR NEPHROSTOMY TUBE PLACEMENT  07/26/2019   • IR NEPHROURETERAL STENT CHECK/CHANGE/REPOSITION  04/06/2021   • IR NEPHROURETERAL STENT CHECK/CHANGE/REPOSITION  06/04/2021   • IR NEPHROURETERAL STENT CHECK/CHANGE/REPOSITION  09/03/2021   • IR NEPHROURETERAL STENT CHECK/CHANGE/REPOSITION  12/07/2021   • IR NEPHROURETERAL STENT CHECK/CHANGE/REPOSITION  03/08/2022   • IR NEPHROURETERAL STENT CHECK/CHANGE/REPOSITION  05/10/2022   • IR NEPHROURETERAL STENT CHECK/CHANGE/REPOSITION  9/19/2022   • IR PICC LINE  09/27/2019   • IR PORT PLACEMENT  07/26/2019   • IR PORT REMOVAL  09/20/2019   • OOPHORECTOMY Bilateral 2017   • TN INSJ TUNNELED CTR VAD W/SUBQ PORT AGE 7 YR/> Left 11/12/2019    Procedure: INSERTION VENOUS PORT ( PORT-A-CATH) IR;  Surgeon: Ezequiel Holder DO;  [facility];  Service: Interventional Radiology   • TN LAP, RADICAL HYST W/ TUBE&OV, NODE BX N/A 12/19/2017    Procedure: HYSTERECTOMY LAPAROSCOPIC TOTAL ([address]) W/ ROBOTICS; BILATERAL SALPINGOOPHERECTOMY; LYMPH NODE DISSECTION; lysis of adhesions;  Surgeon: John Marquez MD;  Location: [facility];  Service: Gynecology Oncology   • TN LAP,DIAGNOSTIC ABDOMEN N/A 12/19/2017    Procedure: LAPAROSCOPY DIAGNOSTIC;  Surgeon: John Marquez MD;  Location: [facility];  Service: Gynecology Oncology   • TONSILLECTOMY     • US GUIDED BREAST BIOPSY RIGHT COMPLETE Right 06/28/2019       Review of Medications:   Vitamins, Supplements and Herbals: yes: Hx RNYGB regimen: Vitamin D, Folic Acid, Align, calcium citrate TID, B12 1000 mcg QD, MVI BID (MVI does not have iron);  Iron 65 mg QD (2 hrs seperate from calcium)    Current Outpatient Medications:   •  amoxicillin (AMOXIL) 500 mg capsule, , Disp: , Rfl:   •  ARIPiprazole (ABILIFY) 20 MG tablet, Take 20 mg by mouth in the morning , Disp: , Rfl:   •  aspirin (ECOTRIN LOW STRENGTH) 81 mg EC tablet, Take 81 mg by mouth daily, Disp: , Rfl:   •  BD PosiFlush 0 9 % SOLN, , Disp: , Rfl:   •  Calcium-Magnesium-Vitamin D (CALCIUM 500 PO), Take by mouth daily , Disp: , Rfl:   •  cholecalciferol (VITAMIN D3) 1,000 units tablet, Take 1,000 Units by mouth daily, Disp: , Rfl:   •  clotrimazole-betamethasone (LOTRISONE) 1-0 05 % cream, Apply topically 2 (two) times a day (Patient not taking: Reported on 8/23/2022), Disp: 30 g, Rfl: 0  •  Cranberry-Vitamin C 250-60 MG CAPS, Take 1 tablet by mouth in the morning, Disp: 90 capsule, Rfl: 3  •  Cyanocobalamin (VITAMIN B12 PO), Take by mouth daily , Disp: , Rfl:   •  dronabinol (MARINOL) 2 5 mg capsule, Take 1 capsule (2 5 mg total) by mouth 2 (two) times a day before meals, Disp: 30 capsule, Rfl: 0  •  enoxaparin (LOVENOX) 80 mg/0 8 mL, Inject 0 8 mL (80 mg total) under the skin every 24 hours, Disp: 72 mL, Rfl: 1  •  ergocalciferol (VITAMIN D2) 50,000 units, Take 1 capsule (50,000 Units total) by mouth once a week for 6 doses (Patient not taking: Reported on 11/29/2022), Disp: 6 capsule, Rfl: 0  •  estradiol (ESTRACE VAGINAL) 0 1 mg/g vaginal cream, Insert 1g nightly for 6 weeks, then 1-2x weekly  (Patient not taking: Reported on 11/11/2022), Disp: 42 5 g, Rfl: 1  •  folic acid (FOLVITE) 1 mg tablet, Take 1 tablet (1 mg total) by mouth daily, Disp: , Rfl: 0  •  gemfibrozil (LOPID) 600 mg tablet, TAKE 1 TABLET BY MOUTH EVERY DAY, Disp: 90 tablet, Rfl: 0  •  lisinopril (ZESTRIL) 5 mg tablet, Take 1 tablet (5 mg total) by mouth daily, Disp: 90 tablet, Rfl: 3  •  LORazepam (ATIVAN) 0 5 mg tablet, Take 1 tablet (0 5 mg total) by mouth every 6 (six) hours as needed for anxiety (or nausea), Disp: 36 tablet, Rfl: 1  •  methylprednisolone (MEDROL) 4 mg tablet, Take 1 tablet (4 mg total) by mouth in the morning Take 2 tables once a day for 5 days, take 1 tablet once a day for 5 days, take 1 tablet every other day for 14 days  (Patient not taking: Reported on 11/11/2022), Disp: 21 tablet, Rfl: 0  •  Multiple Vitamin (MULTIVITAMIN) tablet, Take 1 tablet by mouth daily, Disp: , Rfl:   •  naloxone (NARCAN) 4 mg/0 1 mL nasal spray, Administer 1 spray into a nostril  If no response after 2-3 minutes, give another dose in the other nostril using a new spray   (Patient not taking: Reported on 11/11/2022), Disp: 1 each, Rfl: 0  •  omeprazole (PriLOSEC) 20 mg delayed release capsule, Take 20 mg by mouth daily, Disp: , Rfl:   •  ondansetron (ZOFRAN) 8 mg tablet, Take 1 tablet (8 mg total) by mouth every 8 (eight) hours as needed for nausea or vomiting, Disp: 30 tablet, Rfl: 1  •  oxybutynin (DITROPAN XL) 15 MG 24 hr tablet, Take 1 tablet (15 mg total) by mouth daily at bedtime, Disp: 90 tablet, Rfl: 3  •  rucaparib (RUBRACA) 300 mg tablet, Take 600 mg by mouth every 12 (twelve) hours Take with or without food  Do not repeat a vomited dose , Disp: , Rfl:   •  saccharomyces boulardii (FLORASTOR) 250 mg capsule, Take 1 capsule (250 mg total) by mouth 2 (two) times a day, Disp: , Rfl: 0  •  senna (SENOKOT) 8 6 MG tablet, Take 1 tablet (8 6 mg total) by mouth 2 (two) times a day as needed for constipation, Disp: 60 tablet, Rfl: 0  •  sodium chloride, PF, 0 9 %, 10 mL by Intracatheter route daily, Disp: 300 mL, Rfl: 3  •  venlafaxine (EFFEXOR) 75 mg tablet, Take 75 mg by mouth 3 (three) times a day  , Disp: , Rfl:   •  Vibegron 75 MG TABS, Take 75 mg by mouth in the morning, Disp: 30 tablet, Rfl: 3  No current facility-administered medications for this visit      Facility-Administered Medications Ordered in Other Visits:   •  sodium chloride 0 9 % infusion, 20 mL/hr, Intravenous, Continuous, Rachel Conroy MD, Last Rate: 20 mL/hr at 12/05/22 0908, 20 mL/hr at 12/05/22 0908    Most Recent Lab Results:  Lab Results   Component Value Date    WBC 7 72 12/02/2022    IRON 92 11/19/2021    TIBC 374 11/19/2021    FERRITIN 1,429 (H) 11/19/2021    CHOLESTEROL 155 12/02/2022    CHOL 183 10/27/2017    TRIG 87 04/15/2022    HDL 72 04/15/2022    LDLCALC 100 04/15/2022    ALT 8 12/02/2022    AST 11 (L) 12/02/2022    ALB 3 8 12/02/2022     10/27/2017     05/12/2017    SODIUM 136 12/02/2022    SODIUM 136 11/11/2022    K 3 7 12/02/2022    K 4 4 11/11/2022     12/02/2022    BUN 22 12/02/2022    BUN 26 (H) 11/11/2022    CREATININE 1 36 (H) 12/02/2022    CREATININE 1 43 (H) 11/11/2022    EGFR 40 12/02/2022    PHOS 3 3 12/02/2022    PHOS 3 7 11/11/2022    GLUCOSE 219 (H) 12/19/2017    POCGLU 97 08/18/2020    GLUF 173 (H) 10/21/2022    GLUF 117 (H) 10/01/2022    GLUC 147 (H) 12/02/2022    HGBA1C 5 2 10/31/2022    HGBA1C 4 8 07/07/2021    HGBA1C 5 1 02/03/2020    CALCIUM 9 0 12/02/2022    FOLATE 5 4 10/06/2019 MG 1 7 (L) 12/02/2022     Anthropometric Measurements:   Height: 59"  Ht Readings from Last 1 Encounters:   12/02/22 4' 11" (1 499 m)     Wt Readings from Last 20 Encounters:   12/05/22 53 3 kg (117 lb 8 oz)   12/02/22 54 2 kg (119 lb 8 oz)   11/29/22 54 1 kg (119 lb 3 2 oz)   11/14/22 55 kg (121 lb 4 1 oz)   11/11/22 54 9 kg (121 lb)   10/27/22 56 2 kg (123 lb 12 8 oz)   10/25/22 56 4 kg (124 lb 5 4 oz)   10/21/22 56 5 kg (124 lb 8 oz)   10/11/22 56 6 kg (124 lb 12 8 oz)   10/03/22 55 3 kg (122 lb)   09/19/22 57 6 kg (127 lb)   09/09/22 57 4 kg (126 lb 8 7 oz)   09/09/22 56 7 kg (125 lb)   09/07/22 57 6 kg (127 lb)   09/06/22 57 6 kg (127 lb)   08/30/22 58 1 kg (128 lb)   08/23/22 57 6 kg (127 lb)   08/22/22 57 8 kg (127 lb 8 oz)   08/19/22 57 6 kg (127 lb)   08/18/22 57 2 kg (126 lb)     Weight Hx:  Usual Weight: 270# before RNYGB in 2001; lowest post-op wt: 200#; wt typically fluctuates between 215-230# (prior to wt loss)  Varian: (2/25/20) 185 6#, (3/3/20) 188 6#, (3/10/20) 187 2#, (3/19/20) 186 2#, (3/24/20) 187#, (3/31/20) 185 4#, (4/10/20) 184 4#    Home Scale Wts: (2/19/20) 185#, (8/3/20) 194#    Oncology Nutrition-Anthropometrics    Flowsheet Row Nutrition from 12/5/2022 in [facility] Nutrition from 10/24/2022 in [facility]   Patient [age] (years): [age] years   Patient (female) height (in): 61 in 61 in   Current Weight to be used for anthropometric calculations (lbs) 117 5 lbs 124 5 lbs   Current Weight to be used for anthropometric calculations (kg) 53 4 kg 56 6 kg   BMI: 23 7 25 1   IBW female: 95 lbs 95 lbs   IBW (kg) female: 43 2 kg 43 2 kg   IBW % (female) 123 7 % 131 1 %   Adjusted BW (female): 100 6 lbs 102 4 lbs   Adjusted BW kg (female): 45 7 kg 46 5 kg   % weight change after 1 week: -1 4 % -0 2 %   Weight change after 1 week (lbs) -1 7 lbs -0 3 lbs   % weight change after 1 month: -2 9 % -2 % Weight change after 1 month (lbs) -3 5 lbs -2 5 lbs   % weight change after 3 months: -7 5 % 2 %   Weight change after 3 months (lbs) -9 5 lbs 2 5 lbs        Nutrition-Focused Physical Findings:  moderate muscle depletion ([place]) - hollowing/scooping/depression and moderate body fat depletion (Orbital) - hollowing/depression/dark circles     Food/Nutrition-Related History & Client/Social History:    Current Nutrition Impact Symptoms:   [] Nausea  [] Reduced Appetite  [] Acid Reflux   [] Vomiting  [x] Unintended Wt Loss - ongoing  -pt does not want to regain the wt that she lost [x] Malabsorption - S/p RNYGB in 2001   [] Diarrhea  [] Unintended Wt Gain  [] Dumping Syndrome   [x] Constipation - comes and goes, likely d/t significant decreased in fluid intake  -On bowel protocol [] Thick Mucous/Secretions  [] Abdominal Pain    [x] Dysgeusia (Altered Taste) - bland taste recently  [x] Xerostomia (Dry Mouth) - increased recently  -uses bs/sw rinses and Biotene  -plans to look into hard candies/lozenges and humidifier use at bedside [] Gas    [] Dysosmia (Altered Smell)  [] Thrush  [] Difficulty Chewing    [x] Oral Mucositis (Sore Mouth) - mouth sores  -using biotene products + bs/sw rinses; says MMW was not very effective in the past [] Fatigue  [] Pain:   [] Odynophagia   [] Esophagitis  [] Other:    [] Dysphagia [] Early Satiety  [] No Problems Eating      Food Allergies & Intolerances: yes: had skin patch testing which showed allergy to strawberries, but says she is able to eat strawberries without any side effects    Current Diet: Soft/Moist  Current Nutrition Intake: Decreased since last visit   Appetite: Good   Nutrition Route: PO   Oral Care: Biotene toothpaste, rinse and spray, bs/sw rinses  Activity level: Sedentary  Limited by pain and medical condition      24 Hr Diet Recall: only takes a few bites at meals  Breakfast: 4 oz pudding today during tx; yesterday: Kashi protein waffle with butter (ate half)  Snack: none  Lunch: 1/2 cup [employer] Karli onion soup  Snack: grapes  Dinner: a few bites of eggplant Parmesan, small soft canoli    Snack: none    Beverages: water (16 9 oz x1), sweetened iced tea (32 oz x1)   -Fluid intake significantly decreased d/t unable to use straw   -Does not drink coffee or alcohol   -Does not separate fluids from solids, does not have discomfort while eating and drinking at the same time  Supplements:   Premier Protein Shake (11 oz, 160 kcal, 30 g pro) - none lately   Unjury protein powder - occasionally adds to foods such as soup    Oncology Nutrition-Estimated Needs    Flowsheet Row Nutrition from 3/28/2022 in [place] [address] [facility] Nutrition from 10/11/2021 in [place] [address] [facility]   Weight type used Actual weight Adjusted weight   Weight in kilograms (kg) used for estimated needs 54 1 kg 49 5 kg   Energy needs formula:  35-40 kcal/kg 35-40 kcal/kg   Energy needs based on 35 kcal/k kcal 1733 kcal   Energy needs based on 40 kcal/k kcal 1980 kcal   Protein needs formula: 1 5-2 g/kg 1 5-2 g/kg   Protein needs based on 1 5 g/kg 81 g 74 g   Protein needs based on 2 g/kg 108 g 99 g   Fluid needs formula: 30-35 mL/kg 30-35 mL/kg   Fluid needs based on 30 mL/kg 1623 mL 1485 mL   Fluid needs in ounces 55 oz 50 oz   Fluid needs based on 35 mL/kg 1894 mL 1733 mL   Fluid needs in ounces 64 oz 59 oz        Discussion & Intervention:    Ragini was evaluated today for an RD follow up regarding wt loss and pt request for dietitian involvement throughout tx  Ragini is currently undergoing tx for endometrial cancer  Ragini has lost wt recently  She states she has more mouth sores, xerostomia, fluctuating constipation, dysgeusia, and has only been able to take a few bites at meals despite a good appetite  Her fluid intake has significantly declined recently d/t inability to use straws    She says that one day she made sure to eat something every hour and did feel better at the end of the day  She is hopeful for an upcoming tx break pending upcoming scans per pt  Reviewed 24 hour recall, which revealed an inadequate po intake, and discussed ways to increase kcal, protein, and fluid intakes and optimize nutrient intake  Also reviewed the importance of wt management throughout the tx process and the role of a high kcal/ high protein diet in managing wt and overall health  Based on today's assessment, discussion included: MNT for: wt loss, mouth sores, dysgeusia, xerostomia, constipation, practicing proper oral care, a soft/moist diet & food choices to include at all meals & snacks, fortifying foods for added kcal and protein (examples include: adding cheese to foods such as eggs, mashed potatoes, casseroles, etc ; Making oatmeal with whole milk rather than water; Making fortified mashed potatoes with cream, butter, dry milk powder, plain [place] yogurt, and cheese ), adequate hydration & fluid choices, sipping on calorie containing beverages (examples include: adding whole milk or cream to coffee, oral nutrition supplements, juice, electrolyte replacement beverages, milk, etc ), eating smaller more frequent meals every 2-3 hours (5-6 small meals/day), utilizing oral nutrition supplements and recipe suggestions/resources, trying new recipes, & cooking at home more often  Moving forward, Ragini was encouraged to increase kcal, protein, and fluid intakes  She does best with self-directed goals and has chosen goals for herself moving forward; RD guidance provided during goal setting  Materials Provided: not applicable  All questions and concerns addressed during today’s visit  Ragini has RD contact information  Nutrition Diagnosis:  Inadequate Energy Intake related to physiological causes, disease state and treatment related issues as evidenced by food recall, wt loss and discussion with pt and/or family    Increased Nutrient Needs (kcal & pro) related to increased demand for nutrients and disease state as evidenced by cancer dx and pt undergoing tx for cancer  Altered GI Function related to alteration in GI tract integrity as evidenced by Constipation, Oral Mucositis (Sore Mouth) and Xerostomia (Dry Mouth)  Patient has clinical indicators (or ASPEN criteria) consistent with severe protein-calorie malnutrition in the context of Chronic Illness as evidenced by </=75% energy intake vs  Estimated needs for >/=1 month, moderate muscle depletion (Clav[place]) - protruding/visible bone and moderate body fat depletion (Or[place]) - hollowing/depression/dark circles  Monitoring & Evaluation:   Goals:  weight maintenance/stabilization  adequate nutrition impact symptom management  pt to meet >/=75% estimated nutrition needs daily     Progress Towards Goals: Not Met    Nutrition Rx & Recommendations:  Diet: Soft/Moist, High Calorie, High Protein Diet  Small, frequent meals/snacks may be easier to tolerate than 3 large daily meals  Aim for 5-6 small meals per day (every 2-3 hours)  Include protein at all meals/snacks  Avoid spicy, acidic, sharp/hard/crunchy foods & carbonated beverages as needed  Follow proper oral care; Try baking soda/salt water rinse recipe (mix 3/4 tsp salt + 1 tsp baking soda + 1 qt water; rinse with plain water after using) in Eating Hints book (pg 18)  Brush your teeth before/after meals & before bed  For lack of taste: Practice good oral care  Blend fresh fruits into shakes, ice cream or yogurt  Eat frozen fruits: grapes, chopped cantaloupe, watermelon  Select fresh vegetables (may be more appealing than canned/frozen)  Add extra flavor to foods: experiment with spices, salt & sweeteners, extra oil/butter, acidic foods; marinate meats (see pages 29-30 in your Eating Hints book)    For weight loss: monitor your weight at home at least 2x/week, record your weight, start by adding 250-500 extra calories per day, eat 5-6 small scheduled meals every 2-3 hrs, choose foods that are high in protein and calories (see pages 49-53 in your Eating Hints book), drink liquids with calories for example: milkshakes/smoothies/juice/soup/whole milk/chocolate milk, cook with protein fortified milk (see recipe on page 36 in your Eating Hints book), consider ready-to-drink oral nutrition supplements such as Ensure Plus, Ensure Enlive, Boost Plus, or Boost Very High Calorie, avoid "diet" and "light" foods when possible, avoid drinking too much with meals, contact your dietitian with any continued weight loss over the course of 1 week  For more info see pages 35-37 in your Eating Hints book  For constipation: drink plenty of fluids (>64 oz/day); drink hot liquids; eat high-fiber foods as tolerated (whole grains, beans, peas, nuts, seeds, fruits, vegetables, etc); increase physical activity as tolerated  Avoid increasing fiber intake too quickly, add fiber into your diet slowly; keep a record of your bowel movements (see pages 13-14 in you Eating Hints book)  For sore mouth/throat: choose foods that are easy to chew/swallow; cook foods until they are soft/tender; moisten & soften foods (gravy/sauce/broth/yogurt); cut food into small pieces; drink with a straw (as tolerated); eat with a very small spoon; eat cold or room temperature food; suck on ice chips; Avoid citrus, spicy foods, tomatoes/ketchup, salty foods, raw vegetables, sharp/crunchy/hard foods & alcohol (see pages 23-28 in your Eating Hints book)    For dry mouth: sip water throughout the day; try very sweet (or tart, as tolerated) drinks; chew gum or suck on hard candy, popsicles, & ice chips; eat easy to swallow foods (such as pureed cooked foods or soups); moisten food with sauce/gravy/salad dressing; do not drink beer, wine, or any type of alcohol; keep lips moist with lip balm; avoid tobacco products & second-hand smoke; try Biotene as needed (see pages 17-18 in your Eating Hints book)  Follow proper oral care; Try baking soda/salt water rinse recipe (mix 3/4 tsp salt + 1 tsp baking soda + 1 qt water; rinse with pain water after using) in Eating Hints book (pg 18)  Brush your teeth before/after meals & before bed  Weigh yourself regularly  If you notice weight loss, make an effort to increase your daily food/calorie intake  If you continue to notice loss after these efforts, reach out to your dietitian to establish a plan to stabilize weight       Patient choosen goals:  Resume Premier shakes once daily, add at breakfast as it is the most difficult meal; could also try half portions at a time  Stick to soft/moist foods - jello, pudding, yogurt, drinkable yogurt, popsicles, Italian ice, soups, scrambled eggs with cheese, chicken/tuna/egg salad, etc   Try lozenges and sugar-free hard candies for dry mouth  Try humidifier at bedside for dry mouth  Try tea or warm water with honey to soothe mouth    Follow Up Plan: 1/16 during infusion   Recommend Referral to Other Providers: none at this time

## 2022-11-28 NOTE — PROGRESS NOTES
Daily Note     Today's date: 2022  Patient name: Nhan Escamilla  : 1955  MRN: [id number]  Referring provider: Dario Holder DO  Dx:   Encounter Diagnosis     ICD-10-CM    1  Other closed nondisplaced fracture of proximal end of left humerus with routine healing, subsequent encounter  S42 295D           Start Time: 1300  Stop Time: 1345  Total time in clinic (min): 45 minutes    Subjective: Patient reports she is feeling better today  Objective: See treatment diary below      Assessment: Tolerated treatment well  Patient exhibits improved Left shoulder flexibility and tolerance to manual stretching  Improving motor control and mechanics with scapular strengthening exercises  Progressed resistance with mid rows and extensions to green TB with good tolerance and no reports of pain  Performed shoulder IR stretch with improved mobility and tolerance  Patient exhibited good technique with therapeutic exercises and would benefit from continued PT      Plan: Continue per plan of care  Progress treatment as tolerated         Diagnosis: L proximal humerus fracture (DOI 2022)  Precautions: HTN, DM, hx of chemo, recent nephro stent (2022)  ( * astericks indicates given per HEP)    Manuals 11/9 11/15  11/18 11/25 11/28      STM   DK DK DK      IASTM           PROM SD CF DK DK DK                 Neuro Re-Ed           scap squeezes*           BWD shoulder rolls           Shoulder iso (4-way) 5s x 10 ea nv          Pendulums*           Mid Rows 2x10 RTB nv nv 2x10 RTB 2x10 GTB      Shoulder Ext 2x10 RTB nv  nv 2x10 RTB 2x10 GTB      Web Slide IR/ER    x15ea RTB 2x10 RTB ea                 Ther Ex           Elbow flex/ext*           physioball rollouts seated           Table slides flex and ABD (standing)           AAROM ER with cane Seated 2x10 5s Seated 2 x 10 x 5"  Seated 2x10 Seated 2x10 Seated 2x10      AAROM flexion with cane Supine 2x10 5s Supine   2 x 10 x 5"  supine 2x10 supine 2x10 supine 2x10 AAROM ABD with cane Standing 2x10 nv Standing 2x10 Standing 2x10 Standing 2x10      Wall slides with pillow case Flex x15  Scap x 10 Flex x10  Scap x 10  Flex x15  Scap x 10 Flex 2x10  Scap x10      Pulleys Seated flex + scap 5s x 15 Seated flex + scap 5s x 15 Seated flex + scap 5s x 15 Seated flex + scap 5s x 15 Seated flex + scap 5s x 15      IR stretch with strap   3" x10 standing  3" x15 standing                 Ther Activity           Gripping                                 Pt Ed           Re-Evaluation   DK        Modalities           Heat/ice (prn)

## 2022-11-28 NOTE — TELEPHONE ENCOUNTER
Voicemail was left for patient informing her of rescheduling need for [facility] appointment  New date/time given  Advised to return call if does not work for patient

## 2022-11-29 ENCOUNTER — OFFICE VISIT (OUTPATIENT)
Dept: UROLOGY | Facility: CLINIC | Age: 67
End: 2022-11-29

## 2022-11-29 ENCOUNTER — DOCUMENTATION (OUTPATIENT)
Dept: HEMATOLOGY ONCOLOGY | Facility: MEDICAL CENTER | Age: 67
End: 2022-11-29

## 2022-11-29 VITALS
HEIGHT: 59 IN | WEIGHT: 119.2 LBS | BODY MASS INDEX: 24.03 KG/M2 | DIASTOLIC BLOOD PRESSURE: 78 MMHG | OXYGEN SATURATION: 91 % | SYSTOLIC BLOOD PRESSURE: 118 MMHG | TEMPERATURE: 78 F

## 2022-11-29 DIAGNOSIS — C54.1 ENDOMETRIAL CANCER (HCC): Primary | Chronic | ICD-10-CM

## 2022-11-29 NOTE — PROGRESS NOTES
Spoke with patient but we had a bad connection  She will be calling back, she is already scheduled with Sam Bruce (GYN) in [place] on Fri 12/23/22 at 10:15am and the appointment with Devaughn Rivers (Hem/Onc) on 12/21/22 is cancelled

## 2022-11-29 NOTE — PROGRESS NOTES
11/29/2022    Ragini Al  1955 [id number]        Assessment  History of right hydronephrosis secondary to endometrial carcinoma, status post right nephrostomy tube insertion      Discussion  Fortunately she is doing very well with her right nephroureteral stent capped  In addition her disease appears to be stable on immunotherapy  She will continue to follow with gynecological oncology  She is scheduled to return to Interventional Radiology on December 20, 2022 for routine tube exchange  At that time I would recommend internalization of her right nephroureteral stent to a double-J right ureteral stent  As the tube has been capped for 2 months without issue she likely does not even require a safety nephrostomy tube  One she has an internalized stent she will then return to the operating room with me approximately 3 months later for cystoscopy with a retrograde pyelogram and routine stent exchange versus stent removal if the retrograde pyelogram appears normal   The patient is amenable with this plan  History of Present Illness  74 y o  female with a history of recurrent stage I B endometrial carcinoma  She follows with gynecological oncology  She is receiving immunotherapy  She recently returned to Interventional Radiology on September 19, 2022 and underwent exchange of her nephroureteral stent which was then capped  She has not had an issue since that time  She is very pleased that she does not have a bag external to drain urine  She returns with her friend for routine follow-up and discussion today  In addition her most recent follow-up CT scan from October shows stability of her disease  AUA Symptom Score      Review of Systems  Review of Systems   Constitutional: Negative  HENT: Negative  Eyes: Negative  Respiratory: Negative  Cardiovascular: Negative  Gastrointestinal: Negative  Endocrine: Negative      Genitourinary:        Per HPI   Musculoskeletal: Negative  Skin: Negative  Allergic/Immunologic: Negative  Neurological: Negative  Hematological: Negative  Psychiatric/Behavioral: Negative            Past Medical History  Past Medical History:   Diagnosis Date   • Anemia    • Chemotherapy follow-up examination    • Depression    • Endometrial cancer ([facility] [address] ) 12/2017   • History of chemotherapy     started 12/2021endometrial cancer   • Hyperglycemia     vx type 2 dm -- last assessed 4/1/14; resolved 11/7/17   • Hyperlipidemia    • Hypertension    • Obesity     last assessed 10/14/17; resolved 11/7/17       Past Social History  Past Surgical History:   Procedure Laterality Date   • ABDOMINAL SURGERY      GASTRIC BYPASS   • BREAST BIOPSY Right 06/28/2019    core biopsy; benign   • CHOLECYSTECTOMY      at the time of gastric bypass   • COLONOSCOPY     • CT NEEDLE BIOPSY LYMPH NODE  07/08/2019   • FL GUIDED CENTRAL VENOUS ACCESS DEVICE INSERTION  11/12/2019   • GASTRIC BYPASS     • HYSTERECTOMY Bilateral 2017    total abdominal with salpingo-oophorectomy   • IR NEPHROSTOMY TO NEPHROURETERAL STENT  06/09/2022   • IR NEPHROSTOMY TUBE CHECK/CHANGE/REPOSITION/REINSERTION/UPSIZE  05/27/2022   • IR NEPHROSTOMY TUBE PLACEMENT  07/26/2019   • IR NEPHROURETERAL STENT CHECK/CHANGE/REPOSITION  04/06/2021   • IR NEPHROURETERAL STENT CHECK/CHANGE/REPOSITION  06/04/2021   • IR NEPHROURETERAL STENT CHECK/CHANGE/REPOSITION  09/03/2021   • IR NEPHROURETERAL STENT CHECK/CHANGE/REPOSITION  12/07/2021   • IR NEPHROURETERAL STENT CHECK/CHANGE/REPOSITION  03/08/2022   • IR NEPHROURETERAL STENT CHECK/CHANGE/REPOSITION  05/10/2022   • IR NEPHROURETERAL STENT CHECK/CHANGE/REPOSITION  9/19/2022   • IR PICC LINE  09/27/2019   • IR PORT PLACEMENT  07/26/2019   • IR PORT REMOVAL  09/20/2019   • OOPHORECTOMY Bilateral 2017   • [place] INSJ TUNNELED CTR VAD W/SUBQ PORT AGE 5 YR/> Left 11/12/2019    Procedure: INSERTION VENOUS PORT ( PORT-A-CATH) IR;  Surgeon: Aniya Ruano DO;  Location: [facility];  Service: Interventional Radiology   • OR LAP, RADICAL HYST W/ TUBE&OV, NODE BX N/A 12/19/2017    Procedure: HYSTERECTOMY LAPAROSCOPIC TOTAL ([address]) W/ ROBOTICS; BILATERAL SALPINGOOPHERECTOMY; LYMPH NODE DISSECTION; lysis of adhesions;  Surgeon: Greg Cárdenas MD;  Location: [facility];  Service: Gynecology Oncology   • OR LAP,DIAGNOSTIC ABDOMEN N/A 12/19/2017    Procedure: LAPAROSCOPY DIAGNOSTIC;  Surgeon: Greg Cárdenas MD;  Location: [facility];  Service: Gynecology Oncology   • TONSILLECTOMY     • US GUIDED BREAST BIOPSY RIGHT COMPLETE Right 06/28/2019       Past Family History  Family History   Problem Relation Age of Onset   • Other Mother         dyslipidemia   • Ovarian cancer Mother 48   • Lymphoma Father    • Breast cancer Sister 61   • No Known Problems Maternal Grandmother    • Bone cancer Maternal Grandfather    • Uterine cancer Paternal Grandmother    • No Known Problems Paternal Grandfather    • No Known Problems Brother    • No Known Problems Brother    • No Known Problems Maternal Aunt    • No Known Problems Maternal Aunt    • No Known Problems Maternal Aunt    • No Known Problems Maternal Aunt    • No Known Problems Paternal Aunt    • No Known Problems Paternal Aunt    • No Known Problems Paternal Aunt    • No Known Problems Paternal Aunt        Past Social history  Social History     Socioeconomic History   • Marital status: Single     Spouse name: Not on file   • Number of children: Not on file   • Years of education: Not on file   • Highest education level: Not on file   Occupational History   • Not on file   Tobacco Use   • Smoking status: Never   • Smokeless tobacco: Never   Vaping Use   • Vaping Use: Never used   Substance and Sexual Activity   • Alcohol use: Not Currently   • Drug use: No   • Sexual activity: Not Currently   Other Topics Concern   • Not on file   Social History Narrative   • Not on file     Social Determinants of Health     Financial Resource Strain: Not on file Food Insecurity: Not on file   Transportation Needs: Not on file   Physical Activity: Not on file   Stress: Not on file   Social Connections: Not on file   Intimate Partner Violence: Not on file   Housing Stability: Not on file       Current Medications  Current Outpatient Medications   Medication Sig Dispense Refill   • ARIPiprazole (ABILIFY) 20 MG tablet Take 20 mg by mouth in the morning       • aspirin (ECOTRIN LOW STRENGTH) 81 mg EC tablet Take 81 mg by mouth daily     • BD PosiFlush 0 9 % SOLN      • Calcium-Magnesium-Vitamin D (CALCIUM 500 PO) Take by mouth daily      • cholecalciferol (VITAMIN D3) 1,000 units tablet Take 1,000 Units by mouth daily     • Cranberry-Vitamin C 250-60 MG CAPS Take 1 tablet by mouth in the morning 90 capsule 3   • Cyanocobalamin (VITAMIN B12 PO) Take by mouth daily      • dronabinol (MARINOL) 2 5 mg capsule Take 1 capsule (2 5 mg total) by mouth 2 (two) times a day before meals 30 capsule 0   • enoxaparin (LOVENOX) 80 mg/0 8 mL Inject 0 8 mL (80 mg total) under the skin every 24 hours 72 mL 1   • folic acid (FOLVITE) 1 mg tablet Take 1 tablet (1 mg total) by mouth daily  0   • gemfibrozil (LOPID) 600 mg tablet TAKE 1 TABLET BY MOUTH EVERY DAY 90 tablet 0   • lisinopril (ZESTRIL) 5 mg tablet Take 1 tablet (5 mg total) by mouth daily 90 tablet 3   • LORazepam (ATIVAN) 0 5 mg tablet Take 1 tablet (0 5 mg total) by mouth every 6 (six) hours as needed for anxiety (or nausea) 36 tablet 1   • Multiple Vitamin (MULTIVITAMIN) tablet Take 1 tablet by mouth daily     • omeprazole (PriLOSEC) 20 mg delayed release capsule Take 20 mg by mouth daily     • ondansetron (ZOFRAN) 8 mg tablet Take 1 tablet (8 mg total) by mouth every 8 (eight) hours as needed for nausea or vomiting 30 tablet 1   • oxybutynin (DITROPAN XL) 15 MG 24 hr tablet Take 1 tablet (15 mg total) by mouth daily at bedtime 90 tablet 3   • rucaparib (RUBRACA) 300 mg tablet Take 600 mg by mouth every 12 (twelve) hours Take with or without food  Do not repeat a vomited dose  • saccharomyces boulardii (FLORASTOR) 250 mg capsule Take 1 capsule (250 mg total) by mouth 2 (two) times a day  0   • senna (SENOKOT) 8 6 MG tablet Take 1 tablet (8 6 mg total) by mouth 2 (two) times a day as needed for constipation 60 tablet 0   • sodium chloride, PF, 0 9 % 10 mL by Intracatheter route daily 300 mL 3   • venlafaxine (EFFEXOR) 75 mg tablet Take 75 mg by mouth 3 (three) times a day       • Vibegron 75 MG TABS Take 75 mg by mouth in the morning 30 tablet 3   • amoxicillin (AMOXIL) 500 mg capsule  (Patient not taking: Reported on 11/29/2022)     • clotrimazole-betamethasone (LOTRISONE) 1-0 05 % cream Apply topically 2 (two) times a day (Patient not taking: Reported on 8/23/2022) 30 g 0   • ergocalciferol (VITAMIN D2) 50,000 units Take 1 capsule (50,000 Units total) by mouth once a week for 6 doses (Patient not taking: Reported on 11/29/2022) 6 capsule 0   • estradiol (ESTRACE VAGINAL) 0 1 mg/g vaginal cream Insert 1g nightly for 6 weeks, then 1-2x weekly  (Patient not taking: Reported on 11/11/2022) 42 5 g 1   • methylprednisolone (MEDROL) 4 mg tablet Take 1 tablet (4 mg total) by mouth in the morning Take 2 tables once a day for 5 days, take 1 tablet once a day for 5 days, take 1 tablet every other day for 14 days  (Patient not taking: Reported on 11/11/2022) 21 tablet 0   • naloxone (NARCAN) 4 mg/0 1 mL nasal spray Administer 1 spray into a nostril  If no response after 2-3 minutes, give another dose in the other nostril using a new spray  (Patient not taking: Reported on 11/11/2022) 1 each 0     No current facility-administered medications for this visit         Allergies  Allergies   Allergen Reactions   • Cephalosporins Rash     Which Cephalosporin reaction was to not specified; however, has tolerated Amoxicillin, Cefazolin, and Cefepime       Past Medical History, Social History, Family History, medications and allergies were reviewed  Vitals  Vitals:    11/29/22 0817   BP: 118/78   BP Location: Left arm   Patient Position: Sitting   Cuff Size: Adult   Temp: (!) 78 °F (25 6 °C)   SpO2: 91%   Weight: 54 1 kg (119 lb 3 2 oz)   Height: 4' 11" (1 499 m)       Physical Exam  Physical Exam  On examination she is in no acute distress  A right nephroureteral stent is visualized exiting from her right flank  The insertion site is clean dry and intact  The stent is connected to extension tubing and capped      Results  No results found for: PSA  Lab Results   Component Value Date    GLUCOSE 219 (H) 12/19/2017    CALCIUM 9 7 11/11/2022     10/27/2017    K 4 4 11/11/2022    CO2 22 11/11/2022     11/11/2022    BUN 26 (H) 11/11/2022    CREATININE 1 43 (H) 11/11/2022     Lab Results   Component Value Date    WBC 7 96 11/11/2022    HGB 10 5 (L) 11/11/2022    HCT 33 3 (L) 11/11/2022    MCV 96 11/11/2022     11/11/2022         Office Urine Dip  No results found for this or any previous visit (from the past 1 hour(s)) ]

## 2022-11-30 ENCOUNTER — OFFICE VISIT (OUTPATIENT)
Dept: PHYSICAL THERAPY | Facility: CLINIC | Age: 67
End: 2022-11-30

## 2022-11-30 DIAGNOSIS — S42.295D OTHER CLOSED NONDISPLACED FRACTURE OF PROXIMAL END OF LEFT HUMERUS WITH ROUTINE HEALING, SUBSEQUENT ENCOUNTER: Primary | ICD-10-CM

## 2022-11-30 DIAGNOSIS — C54.1 ENDOMETRIAL CANCER (HCC): Primary | ICD-10-CM

## 2022-11-30 NOTE — PROGRESS NOTES
Daily Note     Today's date: 2022  Patient name: Mi George  : 1955  MRN: [id number]  Referring provider: Ezequiel Pelaez DO  Dx:   Encounter Diagnosis     ICD-10-CM    1  Other closed nondisplaced fracture of proximal end of left humerus with routine healing, subsequent encounter  S42 295D           Start Time: 1015  Stop Time: 1100  Total time in clinic (min): 45 minutes    Subjective: Patient reports she feels some soreness and fatigue, but no significant changes in pain on arrival  She reports steadily improving ROM at home  Objective: See treatment diary below      Assessment: Tolerated treatment well  PROM performed with challenging ROM, with some ERP reported to be "tightness" and not sharp in nature  Improving AAROM into flexion and ER especially  Good form and motor control with UE strengthening exercises, however some muscle soreness reported  Completed session with good effort and without significant increased pain  Declined cold pack at end of session  Advised patient to use cold pack as needed at home  Patient exhibited good technique with therapeutic exercises and would benefit from continued PT      Plan: Continue per plan of care  Progress treatment as tolerated         Diagnosis: L proximal humerus fracture (DOI 2022)  Precautions: HTN, DM, hx of chemo, recent nephro stent (2022)  ( * astericks indicates given per HEP)    Manuals 11/9 11/15  11/18 11/25 11/28 11/30     STM   DK DK DK DK     IASTM           PROM SD CF DK DK DK DK                Neuro Re-Ed           scap squeezes*           BWD shoulder rolls           Shoulder iso (4-way) 5s x 10 ea nv          Pendulums*           Mid Rows 2x10 RTB nv nv 2x10 RTB 2x10 GTB 2x10 GTB     Shoulder Ext 2x10 RTB nv  nv 2x10 RTB 2x10 GTB 2x10 GTB     Web Slide IR/ER    x15ea RTB 2x10 RTB ea 2x10 GTB ea                Ther Ex           Elbow flex/ext*           physioball rollouts seated           Table slides flex and ABD (standing)           AAROM ER with cane Seated 2x10 5s Seated 2 x 10 x 5"  Seated 2x10 Seated 2x10 Seated 2x10 Seated 2x10     AAROM flexion with cane Supine 2x10 5s Supine   2 x 10 x 5"  supine 2x10 supine 2x10 supine 2x10 supine 2x10     AAROM ABD with cane Standing 2x10 nv Standing 2x10 Standing 2x10 Standing 2x10 Standing 2x10     Wall slides with pillow case Flex x15  Scap x 10 Flex x10  Scap x 10  Flex x15  Scap x 10 Flex 2x10  Scap x10 Flex 2x10  Scap x15     Pulleys Seated flex + scap 5s x 15 Seated flex + scap 5s x 15 Seated flex + scap 5s x 15 Seated flex + scap 5s x 15 Seated flex + scap 5s x 15 Seated flex + scap 5s, 2x10     IR stretch with strap   3" x10 standing  3" x15 standing 5" x15 standing                Ther Activity           Gripping                                 Pt Ed           Re-Evaluation   DK        Modalities           Heat/ice (prn)

## 2022-12-01 NOTE — ASSESSMENT & PLAN NOTE
82-year-old with recurrent stage IB endometrial cancer who is receiving treatment per the [facility] trial presents for pre cycle visit    CBC, CMP, mag, UrPC reviewed from previous  Current labs pending  Has f/u with nephro for elevated UrPC    Last imaging 10/18/2022 [facility] - repeat imaging pending after this cycle  Pt did not an improvement in overall QoL after cycle hold and is considering stopping treatment for chemo break pending next CT    We discussed routine surveillance thereafter     Will readdress after CT

## 2022-12-01 NOTE — PROGRESS NOTES
Assessment/Plan:    Problem List Items Addressed This Visit        Genitourinary    Endometrial cancer ([address] ) - Primary (Chronic)     72-year-old with recurrent stage IB endometrial cancer who is receiving treatment per the [facility] trial presents for pre cycle visit    CBC, CMP, mag, UrPC reviewed from previous  Current labs pending  Has f/u with nephro for elevated UrPC    Last imaging 10/18/2022 [facility] - repeat imaging pending after this cycle  Pt did not an improvement in overall QoL after cycle hold and is considering stopping treatment for chemo break pending next CT    We discussed routine surveillance thereafter  Will readdress after CT                Obstruction of right ureter     Plan for internalization             Other    Moderate protein-calorie malnutrition ([address] )     Secondary to decreased po intake and mouth sores  Encouraged protein drinks            Mucositis     Grade 2-3 with noted weight loss  Symptomatic relief discussed  Did not improvement during cycle hold  CHIEF COMPLAINT: chemo      Problem:   Cancer Staging   Endometrial cancer [facility])  Staging form: Corpus Uteri - Carcinoma, AJCC 7th Edition  - Clinical stage from 12/19/2017: FIGO Stage IB (T1b, N0, M0) - Signed by Davie Wright MD on 2/12/2018        Previous therapy:  Oncology History   Endometrial cancer ([address] )   11/17/2017 Initial Diagnosis    Endometrial cancer ([facility] [address] )     11/17/2017 Biopsy    ENDOMETRIAL BIOPSY: WELL DIFFERENTIATED ENDOMETRIAL ADENOCARCINOMA (FIGO I) WITH FOCALMUCINOUS FEATURES  Part B: ENDOCERVICAL POLYP:BENIGN ENDOCERVICAL      12/19/2017 Surgery    Robotic assisted total laparoscopic hysterectomy with bilateral salpingo-oophorectomy and sentinel bilateral pelvic lymph node dissection   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 (4 4 x 3 2 cm tumor, 9 4/15 4mm invasion, NO LVSI, washings revealed atypical cellular changes)     12/19/2017 Genetic Testing    Morrison testing negative     6/28/2019 Biopsy    A  Breast, Right, US BX Right Breast 1000 4 cmfn:  - Benign breast tissue with focal histiocytic aggregate  See comment   - Negative for atypia and in-situ or invasive carcinoma  7/8/2019 Recurrence    Presented with right lower extremity DVT and CT demonstrating right pelvic sidewall mass with venous, ureteral and nerve compression causing significant neuropathic pain  Biopsy:  Lymph Node, Right pelvic lymph node x3:  - High-grade adenocarcinoma  7/30/2019 - 1/6/2020 Chemotherapy    Taxol 175 mg/m2 and carboplatin AUC 6 every 21 days  Dose was reduced to taxol 135 mg/m2 and carboplatin AUC 5  Completed 6 cycles  Treatment protracted due to multiple hospital admissions  2/24/2020 - 4/13/2020 Radiation    adjuvant external beam radiation therapy to the whole pelvis to 4500 cGy followed by boost to gross disease of an additional 2200 cGy     11/23/2020 Progression    New necrotic adenopathy in the retroperitoneum on CT      12/21/2020 -  Chemotherapy    Began chemotherapy with rucaparib, atezolizumab, and bevacizumab as per [facility] clinical trial     Rucaparib held cycle 28 secondary to fatigue  Avastin held since cycle 30 for elevated UPC  Treatment held for one cycle after cycle 31 for mucositis/fatigue             Patient ID: Lore Shore is a 74 y o  female  62-year-old with recurrent stage IB endometrial cancer who is receiving treatment per the [facility] trial presents for pre cycle visit  She has f/u with urology and has tolerated PCNu capping  Plan is for internalization by IR  Pt reports ongoing mucositis with difficulty tolerating p o  Extensive dry mouth is noted  She has noted a few lb weight loss as well  She continues to have fatigue  Denies any shortness of breath, rash, diarrhea        The following portions of the patient's history were reviewed and updated as appropriate: allergies, current medications, past family history, past medical history, past social history, past surgical history and problem list     Review of Systems   Constitutional: Positive for appetite change and fatigue  Negative for chills and fever  HENT: Positive for mouth sores  Respiratory: Negative for chest tightness and shortness of breath  Gastrointestinal: Negative for abdominal distention, abdominal pain, constipation, diarrhea and nausea  Genitourinary: Negative for difficulty urinating, flank pain, frequency, urgency, vaginal bleeding, vaginal discharge and vaginal pain  Musculoskeletal: Negative for back pain, joint swelling and myalgias  Skin: Negative for rash  Neurological: Negative for dizziness, light-headedness, numbness and headaches  Current Outpatient Medications   Medication Sig Dispense Refill   • ARIPiprazole (ABILIFY) 20 MG tablet Take 20 mg by mouth in the morning       • aspirin (ECOTRIN LOW STRENGTH) 81 mg EC tablet Take 81 mg by mouth daily     • BD PosiFlush 0 9 % SOLN      • Calcium-Magnesium-Vitamin D (CALCIUM 500 PO) Take by mouth daily      • cholecalciferol (VITAMIN D3) 1,000 units tablet Take 1,000 Units by mouth daily     • Cranberry-Vitamin C 250-60 MG CAPS Take 1 tablet by mouth in the morning 90 capsule 3   • Cyanocobalamin (VITAMIN B12 PO) Take by mouth daily      • dronabinol (MARINOL) 2 5 mg capsule Take 1 capsule (2 5 mg total) by mouth 2 (two) times a day before meals 30 capsule 0   • enoxaparin (LOVENOX) 80 mg/0 8 mL Inject 0 8 mL (80 mg total) under the skin every 24 hours 72 mL 1   • folic acid (FOLVITE) 1 mg tablet Take 1 tablet (1 mg total) by mouth daily  0   • gemfibrozil (LOPID) 600 mg tablet TAKE 1 TABLET BY MOUTH EVERY DAY 90 tablet 0   • lisinopril (ZESTRIL) 5 mg tablet Take 1 tablet (5 mg total) by mouth daily 90 tablet 3   • LORazepam (ATIVAN) 0 5 mg tablet Take 1 tablet (0 5 mg total) by mouth every 6 (six) hours as needed for anxiety (or nausea) 36 tablet 1   • Multiple Vitamin (MULTIVITAMIN) tablet Take 1 tablet by mouth daily     • omeprazole (PriLOSEC) 20 mg delayed release capsule Take 20 mg by mouth daily     • ondansetron (ZOFRAN) 8 mg tablet Take 1 tablet (8 mg total) by mouth every 8 (eight) hours as needed for nausea or vomiting 30 tablet 1   • oxybutynin (DITROPAN XL) 15 MG 24 hr tablet Take 1 tablet (15 mg total) by mouth daily at bedtime 90 tablet 3   • rucaparib (RUBRACA) 300 mg tablet Take 600 mg by mouth every 12 (twelve) hours Take with or without food  Do not repeat a vomited dose  • saccharomyces boulardii (FLORASTOR) 250 mg capsule Take 1 capsule (250 mg total) by mouth 2 (two) times a day  0   • senna (SENOKOT) 8 6 MG tablet Take 1 tablet (8 6 mg total) by mouth 2 (two) times a day as needed for constipation 60 tablet 0   • sodium chloride, PF, 0 9 % 10 mL by Intracatheter route daily 300 mL 3   • venlafaxine (EFFEXOR) 75 mg tablet Take 75 mg by mouth 3 (three) times a day       • Vibegron 75 MG TABS Take 75 mg by mouth in the morning 30 tablet 3   • amoxicillin (AMOXIL) 500 mg capsule  (Patient not taking: Reported on 11/29/2022)     • clotrimazole-betamethasone (LOTRISONE) 1-0 05 % cream Apply topically 2 (two) times a day (Patient not taking: Reported on 8/23/2022) 30 g 0   • ergocalciferol (VITAMIN D2) 50,000 units Take 1 capsule (50,000 Units total) by mouth once a week for 6 doses (Patient not taking: Reported on 11/29/2022) 6 capsule 0   • estradiol (ESTRACE VAGINAL) 0 1 mg/g vaginal cream Insert 1g nightly for 6 weeks, then 1-2x weekly  (Patient not taking: Reported on 11/11/2022) 42 5 g 1   • methylprednisolone (MEDROL) 4 mg tablet Take 1 tablet (4 mg total) by mouth in the morning Take 2 tables once a day for 5 days, take 1 tablet once a day for 5 days, take 1 tablet every other day for 14 days  (Patient not taking: Reported on 11/11/2022) 21 tablet 0   • naloxone (NARCAN) 4 mg/0 1 mL nasal spray Administer 1 spray into a nostril   If no response after 2-3 minutes, give another dose in the other nostril using a new spray  (Patient not taking: Reported on 11/11/2022) 1 each 0     No current facility-administered medications for this visit  Objective:    Blood pressure 114/68, pulse 85, temperature 97 7 °F (36 5 °C), temperature source Temporal, resp  rate 18, height 4' 11" (1 499 m), weight 54 2 kg (119 lb 8 oz), SpO2 98 %, not currently breastfeeding  Body mass index is 24 14 kg/m²  Body surface area is 1 48 meters squared  Physical Exam  HENT:      Head: Normocephalic and atraumatic  Cardiovascular:      Rate and Rhythm: Normal rate and regular rhythm  Pulmonary:      Effort: Pulmonary effort is normal    Abdominal:      General: There is no distension  Palpations: Abdomen is soft  There is no mass  Genitourinary:     Comments: defer  Musculoskeletal:         General: No swelling  Normal range of motion  Cervical back: Normal range of motion  Skin:     General: Skin is warm and dry  Neurological:      General: No focal deficit present  Mental Status: She is alert     Psychiatric:         Mood and Affect: Mood normal            Lab Results   Component Value Date     10/27/2017    K 3 7 12/02/2022     12/02/2022    CO2 20 (L) 12/02/2022    BUN 22 12/02/2022    CREATININE 1 36 (H) 12/02/2022    GLUCOSE 219 (H) 12/19/2017    GLUF 173 (H) 10/21/2022    CALCIUM 9 0 12/02/2022    CORRECTEDCA 10 5 (H) 11/11/2022    AST 11 (L) 12/02/2022    ALT 8 12/02/2022    ALKPHOS 140 (H) 12/02/2022    PROT 6 9 10/27/2017    BILITOT 0 3 10/27/2017    EGFR 40 12/02/2022     Lab Results   Component Value Date    WBC 7 72 12/02/2022    HGB 9 7 (L) 12/02/2022    HCT 31 1 (L) 12/02/2022    MCV 96 12/02/2022     12/02/2022     Lab Results   Component Value Date    NEUTROABS 6 32 11/11/2022        Trend:  No results found for:

## 2022-12-02 ENCOUNTER — HOSPITAL ENCOUNTER (OUTPATIENT)
Dept: INFUSION CENTER | Facility: CLINIC | Age: 67
End: 2022-12-02

## 2022-12-02 ENCOUNTER — OFFICE VISIT (OUTPATIENT)
Dept: GYNECOLOGIC ONCOLOGY | Facility: CLINIC | Age: 67
End: 2022-12-02

## 2022-12-02 ENCOUNTER — DOCUMENTATION (OUTPATIENT)
Dept: OTHER | Facility: HOSPITAL | Age: 67
End: 2022-12-02

## 2022-12-02 VITALS
HEART RATE: 85 BPM | RESPIRATION RATE: 18 BRPM | HEIGHT: 59 IN | WEIGHT: 119.5 LBS | OXYGEN SATURATION: 98 % | TEMPERATURE: 97.7 F | SYSTOLIC BLOOD PRESSURE: 114 MMHG | DIASTOLIC BLOOD PRESSURE: 68 MMHG | BODY MASS INDEX: 24.09 KG/M2

## 2022-12-02 DIAGNOSIS — K12.30 MUCOSITIS: ICD-10-CM

## 2022-12-02 DIAGNOSIS — C54.1 ENDOMETRIAL CANCER (HCC): Primary | Chronic | ICD-10-CM

## 2022-12-02 DIAGNOSIS — N13.5 OBSTRUCTION OF RIGHT URETER: ICD-10-CM

## 2022-12-02 DIAGNOSIS — E44.0 MODERATE PROTEIN-CALORIE MALNUTRITION (HCC): ICD-10-CM

## 2022-12-02 DIAGNOSIS — C54.1 ENDOMETRIAL CANCER (HCC): Primary | ICD-10-CM

## 2022-12-02 LAB
ALBUMIN SERPL BCP-MCNC: 3.8 G/DL (ref 3.5–5)
ALP SERPL-CCNC: 140 U/L (ref 34–104)
ALT SERPL W P-5'-P-CCNC: 8 U/L (ref 7–52)
AMYLASE SERPL-CCNC: 20 IU/L (ref 29–103)
ANION GAP SERPL CALCULATED.3IONS-SCNC: 11 MMOL/L (ref 4–13)
APTT PPP: 33 SECONDS (ref 23–37)
AST SERPL W P-5'-P-CCNC: 11 U/L (ref 13–39)
BACTERIA UR QL AUTO: ABNORMAL /HPF
BASOPHILS # BLD MANUAL: 0.08 THOUSAND/UL (ref 0–0.1)
BASOPHILS NFR MAR MANUAL: 1 % (ref 0–1)
BILIRUB SERPL-MCNC: 0.29 MG/DL (ref 0.2–1)
BILIRUB UR QL STRIP: NEGATIVE
BUN SERPL-MCNC: 22 MG/DL (ref 5–25)
CALCIUM SERPL-MCNC: 9 MG/DL (ref 8.4–10.2)
CHLORIDE SERPL-SCNC: 105 MMOL/L (ref 96–108)
CHOLEST SERPL-MCNC: 155 MG/DL
CLARITY UR: ABNORMAL
CO2 SERPL-SCNC: 20 MMOL/L (ref 21–32)
COLOR UR: ABNORMAL
CREAT SERPL-MCNC: 1.36 MG/DL (ref 0.6–1.3)
CREAT UR-MCNC: 52.8 MG/DL
EOSINOPHIL # BLD MANUAL: 0 THOUSAND/UL (ref 0–0.4)
EOSINOPHIL NFR BLD MANUAL: 0 % (ref 0–6)
ERYTHROCYTE [DISTWIDTH] IN BLOOD BY AUTOMATED COUNT: 13.6 % (ref 11.6–15.1)
GFR SERPL CREATININE-BSD FRML MDRD: 40 ML/MIN/1.73SQ M
GGT SERPL-CCNC: 17 U/L (ref 5–85)
GLUCOSE SERPL-MCNC: 147 MG/DL (ref 65–140)
GLUCOSE UR STRIP-MCNC: NEGATIVE MG/DL
HCT VFR BLD AUTO: 31.1 % (ref 34.8–46.1)
HGB BLD-MCNC: 9.7 G/DL (ref 11.5–15.4)
HGB UR QL STRIP.AUTO: ABNORMAL
INR PPP: 1 (ref 0.84–1.19)
KETONES UR STRIP-MCNC: NEGATIVE MG/DL
LEUKOCYTE ESTERASE UR QL STRIP: ABNORMAL
LIPASE SERPL-CCNC: 16 U/L (ref 11–82)
LYMPHOCYTES # BLD AUTO: 0.69 THOUSAND/UL (ref 0.6–4.47)
LYMPHOCYTES # BLD AUTO: 9 % (ref 14–44)
MAGNESIUM SERPL-MCNC: 1.7 MG/DL (ref 1.9–2.7)
MCH RBC QN AUTO: 30 PG (ref 26.8–34.3)
MCHC RBC AUTO-ENTMCNC: 31.2 G/DL (ref 31.4–37.4)
MCV RBC AUTO: 96 FL (ref 82–98)
MONOCYTES # BLD AUTO: 0.23 THOUSAND/UL (ref 0–1.22)
MONOCYTES NFR BLD: 3 % (ref 4–12)
MUCOUS THREADS UR QL AUTO: ABNORMAL
NEUTROPHILS # BLD MANUAL: 6.72 THOUSAND/UL (ref 1.85–7.62)
NEUTS BAND NFR BLD MANUAL: 15 % (ref 0–8)
NEUTS SEG NFR BLD AUTO: 72 % (ref 43–75)
NITRITE UR QL STRIP: NEGATIVE
NON-SQ EPI CELLS URNS QL MICRO: ABNORMAL /HPF
PH UR STRIP.AUTO: 6 [PH]
PHOSPHATE SERPL-MCNC: 3.3 MG/DL (ref 2.3–4.1)
PLATELET # BLD AUTO: 318 THOUSANDS/UL (ref 149–390)
PLATELET BLD QL SMEAR: ADEQUATE
PMV BLD AUTO: 10.2 FL (ref 8.9–12.7)
POTASSIUM SERPL-SCNC: 3.7 MMOL/L (ref 3.5–5.3)
PROT SERPL-MCNC: 7.4 G/DL (ref 6.4–8.4)
PROT UR STRIP-MCNC: ABNORMAL MG/DL
PROT UR-MCNC: 217 MG/DL
PROT/CREAT UR: 4.11 MG/G{CREAT} (ref 0–0.1)
PROTHROMBIN TIME: 13.5 SECONDS (ref 11.6–14.5)
RBC # BLD AUTO: 3.23 MILLION/UL (ref 3.81–5.12)
RBC #/AREA URNS AUTO: ABNORMAL /HPF
RBC MORPH BLD: NORMAL
SODIUM SERPL-SCNC: 136 MMOL/L (ref 135–147)
SP GR UR STRIP.AUTO: 1.01 (ref 1–1.03)
T3 SERPL-MCNC: 0.9 NG/ML (ref 0.6–1.8)
T4 FREE SERPL-MCNC: 1.13 NG/DL (ref 0.76–1.46)
TSH SERPL DL<=0.05 MIU/L-ACNC: 1.05 UIU/ML (ref 0.45–4.5)
UROBILINOGEN UR STRIP-ACNC: <2 MG/DL
WBC # BLD AUTO: 7.72 THOUSAND/UL (ref 4.31–10.16)
WBC #/AREA URNS AUTO: ABNORMAL /HPF
WBC CLUMPS # UR AUTO: PRESENT /UL

## 2022-12-02 NOTE — PROGRESS NOTES
Ragini Melendez (1955) is patient #[id number] on the [facility] clinical trial  Pt was seen for cycle 35 pre-treatment office visit and physical exam With Dr Jabier Motley  Patient AE and Conmeds where reviewed  Patient states that she still has some mild fatigue and mouth sores  Patient states she doesn't eat well with the mouth sores  No change to medication  Nephrology discussed with patient that we continue to hold that Avastin to see if she continues to have improvement in her urine protien/cre ratio, Anai Steele is in agreement with hold avastin until further notice  Patient schedule was reviewed and will call with any questions or concerns

## 2022-12-02 NOTE — ASSESSMENT & PLAN NOTE
Grade 2-3 with noted weight loss  Symptomatic relief discussed  Did not improvement during cycle hold

## 2022-12-02 NOTE — PROGRESS NOTES
Labs were reviewed and signed by Dr Florentino Javier   Per protocol, pt is okay to proceed with treatment for cycle 35 day 1 scheduled 02-DEC-2022  Patients Avastin will be held this cycle    Proceed with Atezolizumab and pill dispense for Rucaparib

## 2022-12-02 NOTE — PROGRESS NOTES
Pt here for central labs for clinical trials  Pt offers no complaints  Labs drawn and sent to the lab  Port flushed per protocol  Urine specimen collected and sent to the lab  Aware of next appointments  AVS declined

## 2022-12-05 ENCOUNTER — HOSPITAL ENCOUNTER (OUTPATIENT)
Dept: INFUSION CENTER | Facility: CLINIC | Age: 67
Discharge: HOME | End: 2022-12-05

## 2022-12-05 ENCOUNTER — NUTRITION (OUTPATIENT)
Dept: NUTRITION | Facility: CLINIC | Age: 67
End: 2022-12-05

## 2022-12-05 ENCOUNTER — DOCUMENTATION (OUTPATIENT)
Dept: OTHER | Facility: HOSPITAL | Age: 67
End: 2022-12-05

## 2022-12-05 VITALS
HEART RATE: 70 BPM | SYSTOLIC BLOOD PRESSURE: 102 MMHG | WEIGHT: 117.5 LBS | DIASTOLIC BLOOD PRESSURE: 64 MMHG | RESPIRATION RATE: 18 BRPM | TEMPERATURE: 96.7 F | BODY MASS INDEX: 23.73 KG/M2 | OXYGEN SATURATION: 98 %

## 2022-12-05 DIAGNOSIS — Z71.3 NUTRITIONAL COUNSELING: Primary | ICD-10-CM

## 2022-12-05 RX ORDER — SODIUM CHLORIDE 9 MG/ML
20 INJECTION, SOLUTION INTRAVENOUS CONTINUOUS
Status: DISCONTINUED | OUTPATIENT
Start: 2022-12-05 | End: 2022-12-08 | Stop reason: HOSPADM

## 2022-12-05 RX ADMIN — SODIUM CHLORIDE 20 ML/HR: 0.9 INJECTION, SOLUTION INTRAVENOUS at 09:08

## 2022-12-05 RX ADMIN — Medication 1200 MG: at 09:05

## 2022-12-05 NOTE — PROGRESS NOTES
Ragini Melendez (1955) is patient#[id number] on the [facility] clinical trial  Pt was seen for treatment in the infusion center for cycle 35 on 12/05/2022  Patient was given Cycle 35 Rucaparib pills and drug diary  Patient returned cycle 34 drug and diary  Patient denied any changes to conmeds since office visit prior to infusion  Patient states she feels tired today and still is having decreased appetite an mouth sores  CRN discussed the use of Biotin to help with mouth sores and dry mouth  Also discussed small frequent meals  Patient was made aware of the need for re-consent on study   Once new IFC is available patient agrees to re-consent   Patient was told to call with any questions or concerns  Patient tolerated treatment without incident

## 2022-12-05 NOTE — PROGRESS NOTES
Patient presents today for treatment with Atezolizumab  Patient offers no complaints  VSS  Labs from 12/2/2022 reviewed and per provider, okay to proceed with treatment  Per Dragan Loyola RN in clinical trials okay to proceed with today's treatment  Port accessed without incident with excellent blood return noted

## 2022-12-05 NOTE — PATIENT INSTRUCTIONS
Nutrition Rx & Recommendations:  Diet: Soft/Moist, High Calorie, High Protein Diet  Small, frequent meals/snacks may be easier to tolerate than 3 large daily meals  Aim for 5-6 small meals per day (every 2-3 hours)  Include protein at all meals/snacks  Avoid spicy, acidic, sharp/hard/crunchy foods & carbonated beverages as needed  Follow proper oral care; Try baking soda/salt water rinse recipe (mix 3/4 tsp salt + 1 tsp baking soda + 1 qt water; rinse with plain water after using) in Eating Hints book (pg 18)  Brush your teeth before/after meals & before bed  For lack of taste: Practice good oral care  Blend fresh fruits into shakes, ice cream or yogurt  Eat frozen fruits: grapes, chopped cantaloupe, watermelon  Select fresh vegetables (may be more appealing than canned/frozen)  Add extra flavor to foods: experiment with spices, salt & sweeteners, extra oil/butter, acidic foods; marinate meats (see pages 29-30 in your Eating Hints book)  For weight loss: monitor your weight at home at least 2x/week, record your weight, start by adding 250-500 extra calories per day, eat 5-6 small scheduled meals every 2-3 hrs, choose foods that are high in protein and calories (see pages 49-53 in your Eating Hints book), drink liquids with calories for example: milkshakes/smoothies/juice/soup/whole milk/chocolate milk, cook with protein fortified milk (see recipe on page 36 in your Eating Hints book), consider ready-to-drink oral nutrition supplements such as Ensure Plus, Ensure Enlive, Boost Plus, or Boost Very High Calorie, avoid "diet" and "light" foods when possible, avoid drinking too much with meals, contact your dietitian with any continued weight loss over the course of 1 week  For more info see pages 35-37 in your Eating Hints book    For constipation: drink plenty of fluids (>64 oz/day); drink hot liquids; eat high-fiber foods as tolerated (whole grains, beans, peas, nuts, seeds, fruits, vegetables, etc); increase physical activity as tolerated  Avoid increasing fiber intake too quickly, add fiber into your diet slowly; keep a record of your bowel movements (see pages 13-14 in you Eating Hints book)  For sore mouth/throat: choose foods that are easy to chew/swallow; cook foods until they are soft/tender; moisten & soften foods (gravy/sauce/broth/yogurt); cut food into small pieces; drink with a straw (as tolerated); eat with a very small spoon; eat cold or room temperature food; suck on ice chips; Avoid citrus, spicy foods, tomatoes/ketchup, salty foods, raw vegetables, sharp/crunchy/hard foods & alcohol (see pages 23-28 in your Eating Hints book)  For dry mouth: sip water throughout the day; try very sweet (or tart, as tolerated) drinks; chew gum or suck on hard candy, popsicles, & ice chips; eat easy to swallow foods (such as pureed cooked foods or soups); moisten food with sauce/gravy/salad dressing; do not drink beer, wine, or any type of alcohol; keep lips moist with lip balm; avoid tobacco products & second-hand smoke; try Biotene as needed (see pages 17-18 in your Eating Hints book)  Follow proper oral care; Try baking soda/salt water rinse recipe (mix 3/4 tsp salt + 1 tsp baking soda + 1 qt water; rinse with pain water after using) in Eating Hints book (pg 18)  Brush your teeth before/after meals & before bed  Weigh yourself regularly  If you notice weight loss, make an effort to increase your daily food/calorie intake  If you continue to notice loss after these efforts, reach out to your dietitian to establish a plan to stabilize weight       Patient choosen goals:  Resume Premier shakes once daily, add at breakfast as it is the most difficult meal; could also try half portions at a time  Stick to soft/moist foods - jello, pudding, yogurt, drinkable yogurt, popsicles, Italian ice, soups, scrambled eggs with cheese, chicken/tuna/egg salad, etc   Try lozenges and sugar-free hard candies for dry mouth  Try humidifier at bedside for dry mouth  Try tea or warm water with honey to soothe mouth    Follow Up Plan: 1/16 during infusion   Recommend Referral to Other Providers: none at this time

## 2022-12-06 ENCOUNTER — APPOINTMENT (OUTPATIENT)
Dept: PHYSICAL THERAPY | Facility: CLINIC | Age: 67
End: 2022-12-06

## 2022-12-08 ENCOUNTER — OFFICE VISIT (OUTPATIENT)
Dept: PHYSICAL THERAPY | Facility: CLINIC | Age: 67
End: 2022-12-08

## 2022-12-08 DIAGNOSIS — S42.295D OTHER CLOSED NONDISPLACED FRACTURE OF PROXIMAL END OF LEFT HUMERUS WITH ROUTINE HEALING, SUBSEQUENT ENCOUNTER: Primary | ICD-10-CM

## 2022-12-08 NOTE — PROGRESS NOTES
Daily Note     Today's date: 2022  Patient name: Judi Eubanks  : 1955  MRN: [id number]  Referring provider: Rocio Riddle DO  Dx:   Encounter Diagnosis     ICD-10-CM    1  Other closed nondisplaced fracture of proximal end of left humerus with routine healing, subsequent encounter  S42 295D           Start Time: 1015  Stop Time: 1050  Total time in clinic (min): 35 minutes    Subjective: Patient reports she still has some fatigue from the chemo earlier this week, but is feeling a little better compared to 2 days ago  She reports she was not able to do much exercises at home due to overall feeling fatigued and weak  Objective: See treatment diary below      Assessment: Tolerated treatment well  PROM performed to patient's tolerance  Focused session on ROM especially ABD/scaption and flexion  Performed web slide strengthening exercises with good effort and form  Deferred IR stretch and wall slides today due to reports of some fatigue end of session and requested to leave 10 min early  Patient exhibited good technique with therapeutic exercises and would benefit from continued PT      Plan: Continue per plan of care  Progress treatment as tolerated         Diagnosis: L proximal humerus fracture (DOI 2022)  Precautions: HTN, DM, hx of chemo, recent nephro stent (2022)  ( * astericks indicates given per HEP)    Manuals 11/9 11/15  11/18 11/25 11/28 11/30 12/8    STM   DK DK DK DK DK    IASTM           PROM SD CF DK DK DK DK DK               Neuro Re-Ed           Mid Rows 2x10 RTB nv nv 2x10 RTB 2x10 GTB 2x10 GTB 2x10 GTB    Shoulder Ext 2x10 RTB nv  nv 2x10 RTB 2x10 GTB 2x10 GTB 2x10 GTB    Web Slide IR/ER    x15ea RTB 2x10 RTB ea 2x10 GTB ea 2x10 GTB ea               Ther Ex           physioball rollouts seated           Table slides flex and ABD (standing)           AAROM ER with cane Seated 2x10 5s Seated 2 x 10 x 5"  Seated 2x10 Seated 2x10 Seated 2x10 Seated 2x10 Seated 2x10    AAROM flexion with cane Supine 2x10 5s Supine   2 x 10 x 5"  supine 2x10 supine 2x10 supine 2x10 supine 2x10 supine 2x10    AAROM ABD with cane Standing 2x10 nv Standing 2x10 Standing 2x10 Standing 2x10 Standing 2x10 Standing 2x10    Wall slides with pillow case Flex x15  Scap x 10 Flex x10  Scap x 10  Flex x15  Scap x 10 Flex 2x10  Scap x10 Flex 2x10  Scap x15 NV    Pulleys Seated flex + scap 5s x 15 Seated flex + scap 5s x 15 Seated flex + scap 5s x 15 Seated flex + scap 5s x 15 Seated flex + scap 5s x 15 Seated flex + scap 5s, 2x10 Seated flex + scap 5s, 2x10    IR stretch with strap   3" x10 standing  3" x15 standing 5" x15 standing NV               Ther Activity           UBE                      Pt Ed           Re-Evaluation   DK        Modalities           Heat/ice (prn)

## 2022-12-12 ENCOUNTER — OFFICE VISIT (OUTPATIENT)
Dept: PHYSICAL THERAPY | Facility: CLINIC | Age: 67
End: 2022-12-12

## 2022-12-12 DIAGNOSIS — S42.295D OTHER CLOSED NONDISPLACED FRACTURE OF PROXIMAL END OF LEFT HUMERUS WITH ROUTINE HEALING, SUBSEQUENT ENCOUNTER: Primary | ICD-10-CM

## 2022-12-12 NOTE — PROGRESS NOTES
Daily Note     Today's date: 2022  Patient name: Stephania Vuong  : 1955  MRN: [id number]  Referring provider: Crispin Roque DO  Dx:   Encounter Diagnosis     ICD-10-CM    1  Other closed nondisplaced fracture of proximal end of left humerus with routine healing, subsequent encounter  S42 295D           Start Time: 1300  Stop Time: 1340  Total time in clinic (min): 40 minutes    Subjective: Patient reports the chemo still makes her have trouble eating  Patient reports she feels a little fatigued on arrival, but a little better than last session  Objective: See treatment diary below      Assessment: Tolerated treatment well  Steadily progressing flexion PROM, however continues to be limited due to pain  Patient noted to have improved ER and ABD mobility with PROM and AAROM exercises  Improved form and muscle activation noted with Web slide strengthening exercises  Deferred wall slides due to patient's report of some fatigue and requests to leave early  Patient exhibited good technique with therapeutic exercises and would benefit from continued PT      Plan: Continue per plan of care  Progress note during next visit        Diagnosis: L proximal humerus fracture (DOI 2022)  Precautions: HTN, DM, hx of chemo, recent nephro stent (2022)  ( * astericks indicates given per HEP)    Manuals 11/9 11/15  11/18 11/25 11/28 11/30 12/8 12/12   STM   DK DK DK DK DK DK   IASTM           PROM SD CF DK DK DK DK DK DK              Neuro Re-Ed           Mid Rows 2x10 RTB nv nv 2x10 RTB 2x10 GTB 2x10 GTB 2x10 GTB 2x10 GTB   Shoulder Ext 2x10 RTB nv  nv 2x10 RTB 2x10 GTB 2x10 GTB 2x10 GTB 2x10 GTB   Web Slide IR/ER    x15ea RTB 2x10 RTB ea 2x10 GTB ea 2x10 GTB ea 2x10 GTBea              Ther Ex           physioball rollouts seated           Table slides flex and ABD (standing)           AAROM ER with cane Seated 2x10 5s Seated 2 x 10 x 5"  Seated 2x10 Seated 2x10 Seated 2x10 Seated 2x10 Seated 2x10 Seated 2x10   AAROM flexion with cane Supine 2x10 5s Supine   2 x 10 x 5"  supine 2x10 supine 2x10 supine 2x10 supine 2x10 supine 2x10 supine 2x10   AAROM ABD with cane Standing 2x10 nv Standing 2x10 Standing 2x10 Standing 2x10 Standing 2x10 Standing 2x10 Standing 2x10   Wall slides with pillow case Flex x15  Scap x 10 Flex x10  Scap x 10  Flex x15  Scap x 10 Flex 2x10  Scap x10 Flex 2x10  Scap x15 NV NV   Pulleys Seated flex + scap 5s x 15 Seated flex + scap 5s x 15 Seated flex + scap 5s x 15 Seated flex + scap 5s x 15 Seated flex + scap 5s x 15 Seated flex + scap 5s, 2x10 Seated flex + scap 5s, 2x10 Seated flex + scap 5s, 2x10   IR stretch with strap   3" x10 standing  3" x15 standing 5" x15 standing NV NV              Ther Activity           UBE                      Pt Ed           Re-Evaluation   DK        Modalities           Heat/ice (prn)

## 2022-12-13 ENCOUNTER — PREP FOR PROCEDURE (OUTPATIENT)
Dept: INTERVENTIONAL RADIOLOGY/VASCULAR | Facility: CLINIC | Age: 67
End: 2022-12-13

## 2022-12-13 ENCOUNTER — TELEPHONE (OUTPATIENT)
Dept: INTERVENTIONAL RADIOLOGY/VASCULAR | Facility: CLINIC | Age: 67
End: 2022-12-13

## 2022-12-13 ENCOUNTER — OFFICE VISIT (OUTPATIENT)
Dept: NEPHROLOGY | Facility: CLINIC | Age: 67
End: 2022-12-13

## 2022-12-13 VITALS
SYSTOLIC BLOOD PRESSURE: 112 MMHG | HEIGHT: 59 IN | WEIGHT: 115 LBS | BODY MASS INDEX: 23.18 KG/M2 | DIASTOLIC BLOOD PRESSURE: 80 MMHG | HEART RATE: 70 BPM

## 2022-12-13 DIAGNOSIS — R80.9 PROTEINURIA, UNSPECIFIED TYPE: Primary | ICD-10-CM

## 2022-12-13 DIAGNOSIS — N13.5 OBSTRUCTION OF RIGHT URETER: Primary | ICD-10-CM

## 2022-12-13 RX ORDER — SODIUM CHLORIDE 9 MG/ML
75 INJECTION, SOLUTION INTRAVENOUS CONTINUOUS
Status: CANCELLED | OUTPATIENT
Start: 2022-12-13

## 2022-12-13 RX ORDER — LEVOFLOXACIN 5 MG/ML
500 INJECTION, SOLUTION INTRAVENOUS ONCE
Status: CANCELLED | OUTPATIENT
Start: 2022-12-13 | End: 2022-12-13

## 2022-12-13 NOTE — TELEPHONE ENCOUNTER
Wyatt Johnson,  We need to change this patient's procedure for 12/20 from Nephroureteral stent change to double J stent conversion, as per Dr Davis Back note  I will place the new order, please swap it out   Thanks,  Ian Agarwal

## 2022-12-13 NOTE — PROGRESS NOTES
NEPHROLOGY OFFICE VISIT   Himanshu Melendez 74 y o  female MRN: [id number]  12/13/2022    Reason for Visit: Proteinuria    ASSESSMENT and PLAN:  It was a pleasure evaluating your patient in the office today  Thank you for allowing our team to participate in the care of Ms Ragini Melendez  Please do not hesitate to contact our team if further issues/questions shall arise in the interim     72-year-old female was seen in Nephrology office today for evaluation of proteinuria      # Nephrotic range Proteinuria  >>Workup   - Urine albumin to creatinine ratio 150 milligram/gram in 2017  - 2+ proteinuria on dipstick in 2019   - Urine protein to creatinine ratio almost 1 g in 12/2020 before initiating Avastin  - Urine protein to creatinine ratio around 1-2 g since starting Avastin  - Urine protein to creatinine ratio most recently on the rise with a peak of 6 g on 10/01/2022  - Avastin was subsequently held after last dose on 09/09/2022  - Urine protein to creatinine ratio 2 8-4 1 (11/2022-12/2022) after Avastin has been on hold   - Urinalysis with large blood, innumerable RBCs, innumerable WBCs, moderate bacteriuria in setting of right-sided nephroureteral stent  - Serum albumin 3 8, no evidence of hyperlipidemia  - HbA1c 5 2, MARY ANNE 2 R-  - CHERI 1:80, dsDNA negative, no hypocomplementemia (no clinical evidence of lupus)  - Hep B/hep C/HIV negative    >>Etiology  - Worsening of proteinuria is related to use of VEGF inhibitor but she can have baseline glomerular pathology as proteinuria dates back to 2017  - Differential diagnosis includes proteinuria secondary to VEGF inhibitor versus membranous nephropathy in setting of malignancy versus adaptive FSGS versus minimal-change disease     >> Plan  - Continue lisinopril 5 mg daily to decrease proteinuria  - She may need a kidney biopsy for further evaluation if proteinuria fails to return to baseline on next lab check as she has been off Avastin since 09/2022     # Recurrent stage I B endometrial cancer  - Diagnosis was made in 2017 and she underwent total abdominal hysterectomy and bilateral salpingo oophorectomy   - Currently on ENDOBARR (rucaparib, atezolizumab, and bevacizumab) clinical trial started in 12/2020     # Chronic Kidney Disease Stage II/III  - Etiology/risk factors: Hypertension, obstructive uropathy, underlying glomerular pathology, age-related nephron loss   - MONROE: Bump in creatinine in the last few weeks may be related to volume depletion, hemodynamic changes from initiation of RAAS inhibitor, recurrence of hydronephrosis, use of gemfibrozil also increases serum creatinine by competing with secretion of creatinine  - Baseline Cr: 0 9-1 1, recently 1 3-1 4   - Urinalysis with large blood, innumerable RBCs, innumerable WBCs, moderate bacteriuria in setting of right-sided nephroureteral stent  - Proteinuria: As above   - Imaging: Right side nephroureteral catheter with subcapsular collection measuring 3 7 cm, mild right renal atrophy, right renal cysts, 2 mm nonobstructing calculus in the left lower pole  - Discussed risk factor reduction to slow progression of chronic kidney disease  - Discussed avoidance of NSAIDs due to their short-term and long-term effects on kidney function     # Right hydronephrosis   - Secondary to endometrial carcinoma, status post right nephrostomy tube with later conversion to nephroureteral stent   - 09/2022: Successful exchange of 10 [place] 24 cm nephroureteral stent under fluoroscopic control, Catheter was capped  - If the patient tolerates capping, the plan is to convert to a double-J stent at the next visit and urology will would then do the patient's double-J exchanges cystoscopically  - Given slight bump in creatinine we will reach out to urology regarding possibility of recurrence of hydronephrosis as nephroureteral catheter is currently capped  - She is getting CT abdomen on Monday which will give us the answer     # Hypertension/Volume   - Goal BP <120/80 per KDIGO guidelines   - Volume status: Euvolemic  - Status: Blood pressure currently at goal  - Current antihypertensive regimen: Lisinopril 5 mg daily     # Screening for Anemia   - Target Hb: More than 10 g/dL  - Most recent hemoglobin: 9 7 g/dL  - Management per oncology     # Electrolytes/Acid Base status   - Electrolytes/acid-base status currently stable  - Mildly low serum bicarbonate, trend for now     # CKD Mineral and Bone Disorder   - Goal Ca 8 5-10 mg/dL, goal Phos 2 7-4 6 mg/dL, goal iPTH 30-70 pg/mL  - She has an elevated PTH but also has vitamin-D insufficiency with level of around 25 despite being on vitamin-D supplementation with 2000 units daily  - Most likely cause for elevated PTH is likely secondary hyperparathyroidism from vitamin-D insufficiency  - She was started on ergocalciferol 50,000 units weekly 01/11/2022 for 6 weeks and then resume 2000 units daily     # Other issues   - History of DVT on Lovenox  - History of gastric bypass in 2001    HPI:  Since last visit: She has not resumed Avastin  She has not eaten well in the last few weeks  She has lost weight  Juan Jo is a 74 y o  female who has history of endometrial cancer status post total abdominal hysterectomy and bilateral salpingo-oophorectomy, status post chemotherapy with Taxol and carboplatin in 2019, currently onrucaparib, atezolizumab, and bevacizumab  Bevacizumab was held on last 2 cycles due to worsening proteinuria  She has history of obesity for which he underwent gastric bypass surgery in 2001  She gained weight and was requiring metformin for prediabetes and quinapril for hypertension before she was diagnosed with endometrial cancer    Since diagnosis of endometrial cancer she has lost significant amount of weight; anti diabetic and antihypertensive medications were stopped        >> Major risk factors for CKD  - Diabetes: Previously pre-diabetic on Metformin  - Hypertension: Previous history of hypertension but not on any medications   - Age ? 56 years: Yes   - Family history of kidney disease: Father had kidney stones, his cousin was on dialysis   - Obesity or metabolic syndrome: Yes      >> Medical history evaluation   - Prior kidney disease or dialysis: No  - Incidental hematuria in the past: Yes with ureteral stent in place   - Urinary symptoms: Urinary incontinence   - History of foamy or frothy urine: No   - History of nephrolithiasis: Yes but never passed a stone   - Diseases that share risk factors with CKD: DM, HTN  - Systemic diseases that might affect kidney: No   - History of use of medications that might affect renal function: No     OBJECTIVE:  Current Weight: Weight - Scale: 52 2 kg (115 lb)  Vitals:    12/13/22 1215   BP: 112/80   BP Location: Left arm   Patient Position: Sitting   Cuff Size: Standard   Pulse: 70   Weight: 52 2 kg (115 lb)   Height: 4' 11" (1 499 m)    Body mass index is 23 23 kg/m²  REVIEW OF SYSTEMS:    Review of Systems   Constitutional: Positive for appetite change  Negative for chills and fever  HENT: Negative for ear pain and sore throat  Eyes: Negative for pain and visual disturbance  Respiratory: Negative for cough and shortness of breath  Cardiovascular: Negative for chest pain and palpitations  Gastrointestinal: Negative for abdominal pain and vomiting  Genitourinary: Negative for dysuria and hematuria  Musculoskeletal: Negative for arthralgias and back pain  Skin: Negative for color change and rash  Neurological: Negative for seizures and syncope  All other systems reviewed and are negative  PHYSICAL EXAM:      Physical Exam  Constitutional:       Appearance: Normal appearance  HENT:      Head: Normocephalic and atraumatic  Mouth/Throat:      Mouth: Mucous membranes are moist       Pharynx: Oropharynx is clear  Cardiovascular:      Rate and Rhythm: Normal rate and regular rhythm  Pulses: Normal pulses  Heart sounds: Normal heart sounds  Pulmonary:      Effort: Pulmonary effort is normal       Breath sounds: Normal breath sounds  Abdominal:      General: Bowel sounds are normal       Palpations: Abdomen is soft  Musculoskeletal:         General: Normal range of motion  Right lower leg: No edema  Left lower leg: No edema  Skin:     General: Skin is warm and dry  Neurological:      General: No focal deficit present  Mental Status: She is alert and oriented to person, place, and time  Mental status is at baseline  Psychiatric:         Mood and Affect: Mood normal          Behavior: Behavior normal          Thought Content:  Thought content normal          Judgment: Judgment normal          Medications:    Current Outpatient Medications:   •  ARIPiprazole (ABILIFY) 20 MG tablet, Take 20 mg by mouth in the morning , Disp: , Rfl:   •  aspirin (ECOTRIN LOW STRENGTH) 81 mg EC tablet, Take 81 mg by mouth daily, Disp: , Rfl:   •  Calcium-Magnesium-Vitamin D (CALCIUM 500 PO), Take by mouth daily , Disp: , Rfl:   •  cholecalciferol (VITAMIN D3) 1,000 units tablet, Take 1,000 Units by mouth daily, Disp: , Rfl:   •  Cranberry-Vitamin C 250-60 MG CAPS, Take 1 tablet by mouth in the morning, Disp: 90 capsule, Rfl: 3  •  Cyanocobalamin (VITAMIN B12 PO), Take by mouth daily , Disp: , Rfl:   •  dronabinol (MARINOL) 2 5 mg capsule, Take 1 capsule (2 5 mg total) by mouth 2 (two) times a day before meals, Disp: 30 capsule, Rfl: 0  •  enoxaparin (LOVENOX) 80 mg/0 8 mL, Inject 0 8 mL (80 mg total) under the skin every 24 hours, Disp: 72 mL, Rfl: 1  •  gemfibrozil (LOPID) 600 mg tablet, TAKE 1 TABLET BY MOUTH EVERY DAY, Disp: 90 tablet, Rfl: 0  •  lisinopril (ZESTRIL) 5 mg tablet, Take 1 tablet (5 mg total) by mouth daily, Disp: 90 tablet, Rfl: 3  •  LORazepam (ATIVAN) 0 5 mg tablet, Take 1 tablet (0 5 mg total) by mouth every 6 (six) hours as needed for anxiety (or nausea), Disp: 36 tablet, Rfl: 1  •  Multiple Vitamin (MULTIVITAMIN) tablet, Take 1 tablet by mouth daily, Disp: , Rfl:   •  omeprazole (PriLOSEC) 20 mg delayed release capsule, Take 20 mg by mouth daily, Disp: , Rfl:   •  ondansetron (ZOFRAN) 8 mg tablet, Take 1 tablet (8 mg total) by mouth every 8 (eight) hours as needed for nausea or vomiting, Disp: 30 tablet, Rfl: 1  •  oxybutynin (DITROPAN XL) 15 MG 24 hr tablet, Take 1 tablet (15 mg total) by mouth daily at bedtime, Disp: 90 tablet, Rfl: 3  •  rucaparib (RUBRACA) 300 mg tablet, Take 600 mg by mouth every 12 (twelve) hours Take with or without food  Do not repeat a vomited dose , Disp: , Rfl:   •  saccharomyces boulardii (FLORASTOR) 250 mg capsule, Take 1 capsule (250 mg total) by mouth 2 (two) times a day, Disp: , Rfl: 0  •  senna (SENOKOT) 8 6 MG tablet, Take 1 tablet (8 6 mg total) by mouth 2 (two) times a day as needed for constipation, Disp: 60 tablet, Rfl: 0  •  venlafaxine (EFFEXOR) 75 mg tablet, Take 75 mg by mouth 3 (three) times a day  , Disp: , Rfl:   •  Vibegron 75 MG TABS, Take 75 mg by mouth in the morning, Disp: 30 tablet, Rfl: 3  •  amoxicillin (AMOXIL) 500 mg capsule, , Disp: , Rfl:   •  BD PosiFlush 0 9 % SOLN, , Disp: , Rfl:   •  clotrimazole-betamethasone (LOTRISONE) 1-0 05 % cream, Apply topically 2 (two) times a day (Patient not taking: Reported on 8/23/2022), Disp: 30 g, Rfl: 0  •  ergocalciferol (VITAMIN D2) 50,000 units, Take 1 capsule (50,000 Units total) by mouth once a week for 6 doses (Patient not taking: Reported on 11/29/2022), Disp: 6 capsule, Rfl: 0  •  estradiol (ESTRACE VAGINAL) 0 1 mg/g vaginal cream, Insert 1g nightly for 6 weeks, then 1-2x weekly   (Patient not taking: Reported on 11/11/2022), Disp: 42 5 g, Rfl: 1  •  folic acid (FOLVITE) 1 mg tablet, Take 1 tablet (1 mg total) by mouth daily, Disp: , Rfl: 0  •  methylprednisolone (MEDROL) 4 mg tablet, Take 1 tablet (4 mg total) by mouth in the morning Take 2 tables once a day for 5 days, take 1 tablet once a day for 5 days, take 1 tablet every other day for 14 days  (Patient not taking: Reported on 11/11/2022), Disp: 21 tablet, Rfl: 0  •  naloxone (NARCAN) 4 mg/0 1 mL nasal spray, Administer 1 spray into a nostril  If no response after 2-3 minutes, give another dose in the other nostril using a new spray   (Patient not taking: Reported on 11/11/2022), Disp: 1 each, Rfl: 0  •  sodium chloride, PF, 0 9 %, 10 mL by Intracatheter route daily, Disp: 300 mL, Rfl: 3    Laboratory Results:        Invalid input(s): ALBUMIN    Results for orders placed or performed during the hospital encounter of 12/02/22   Lipase   Result Value Ref Range    Lipase 16 11 - 82 u/L   Protime-INR   Result Value Ref Range    Protime 13 5 11 6 - 14 5 seconds    INR 1 00 0 84 - 1 19   T4, free   Result Value Ref Range    Free T4 1 13 0 76 - 1 46 ng/dL   T3   Result Value Ref Range    T3, Total 0 90 0 60 - 1 80 ng/mL   Phosphorus   Result Value Ref Range    Phosphorus 3 3 2 3 - 4 1 mg/dL   Magnesium   Result Value Ref Range    Magnesium 1 7 (L) 1 9 - 2 7 mg/dL   Gamma GT   Result Value Ref Range    GGT 17 5 - 85 U/L   Comprehensive metabolic panel   Result Value Ref Range    Sodium 136 135 - 147 mmol/L    Potassium 3 7 3 5 - 5 3 mmol/L    Chloride 105 96 - 108 mmol/L    CO2 20 (L) 21 - 32 mmol/L    ANION GAP 11 4 - 13 mmol/L    BUN 22 5 - 25 mg/dL    Creatinine 1 36 (H) 0 60 - 1 30 mg/dL    Glucose 147 (H) 65 - 140 mg/dL    Calcium 9 0 8 4 - 10 2 mg/dL    AST 11 (L) 13 - 39 U/L    ALT 8 7 - 52 U/L    Alkaline Phosphatase 140 (H) 34 - 104 U/L    Total Protein 7 4 6 4 - 8 4 g/dL    Albumin 3 8 3 5 - 5 0 g/dL    Total Bilirubin 0 29 0 20 - 1 00 mg/dL    eGFR 40 ml/min/1 73sq m   Cholesterol, total   Result Value Ref Range    Cholesterol 155 See Comment mg/dL   CBC and differential   Result Value Ref Range    WBC 7 72 4 31 - 10 16 Thousand/uL    RBC 3 23 (L) 3 81 - 5 12 Million/uL    Hemoglobin 9 7 (L) 11 5 - 15 4 g/dL    Hematocrit 31 1 (L) 34 8 - 46 1 %    MCV 96 82 - 98 fL    MCH 30 0 26 8 - 34 3 pg    MCHC 31 2 (L) 31 4 - 37 4 g/dL    RDW 13 6 11 6 - 15 1 %    MPV 10 2 8 9 - 12 7 fL    Platelets 281 973 - 630 Thousands/uL   APTT   Result Value Ref Range    PTT 33 23 - 37 seconds   Amylase   Result Value Ref Range    Amylase 20 (L) 29 - 103 IU/L   TSH, 3rd generation   Result Value Ref Range    TSH 3RD GENERATON 1 050 0 450 - 4 500 uIU/mL   Manual Differential(PHLEBS Do Not Order)   Result Value Ref Range    Segmented % 72 43 - 75 %    Bands % 15 (H) 0 - 8 %    Lymphocytes % 9 (L) 14 - 44 %    Monocytes % 3 (L) 4 - 12 %    Eosinophils, % 0 0 - 6 %    Basophils % 1 0 - 1 %    Absolute Neutrophils 6 72 1 85 - 7 62 Thousand/uL    Lymphocytes Absolute 0 69 0 60 - 4 47 Thousand/uL    Monocytes Absolute 0 23 0 00 - 1 22 Thousand/uL    Eosinophils Absolute 0 00 0 00 - 0 40 Thousand/uL    Basophils Absolute 0 08 0 00 - 0 10 Thousand/uL    Total Counted      RBC Morphology Normal     Platelet Estimate Adequate Adequate     *Note: Due to a large number of results and/or encounters for the requested time period, some results have not been displayed  A complete set of results can be found in Results Review

## 2022-12-14 ENCOUNTER — TELEPHONE (OUTPATIENT)
Dept: INTERVENTIONAL RADIOLOGY/VASCULAR | Facility: CLINIC | Age: 67
End: 2022-12-14

## 2022-12-14 ENCOUNTER — TELEPHONE (OUTPATIENT)
Dept: RADIOLOGY | Facility: HOSPITAL | Age: 67
End: 2022-12-14

## 2022-12-14 DIAGNOSIS — N32.81 OAB (OVERACTIVE BLADDER): ICD-10-CM

## 2022-12-14 RX ORDER — VIBEGRON 75 MG/1
TABLET, FILM COATED ORAL
Qty: 90 TABLET | Refills: 2 | Status: SHIPPED | OUTPATIENT
Start: 2022-12-14

## 2022-12-14 NOTE — TELEPHONE ENCOUNTER
We need to change this patient's procedure for 12/20 from Nephroureteral stent change to double J stent conversion, as per Dr Erick Ibarra note  I will place the new order, please swap it out  I already sent this message directly to Jono Rain, but I have not heard from her  Figured she must be off    Thanks,  Krystina Park

## 2022-12-15 ENCOUNTER — EVALUATION (OUTPATIENT)
Dept: PHYSICAL THERAPY | Facility: CLINIC | Age: 67
End: 2022-12-15

## 2022-12-15 DIAGNOSIS — S42.295D OTHER CLOSED NONDISPLACED FRACTURE OF PROXIMAL END OF LEFT HUMERUS WITH ROUTINE HEALING, SUBSEQUENT ENCOUNTER: Primary | ICD-10-CM

## 2022-12-15 DIAGNOSIS — C54.1 ENDOMETRIAL CANCER (HCC): Primary | ICD-10-CM

## 2022-12-15 NOTE — PROGRESS NOTES
PT Re-Evaluation     Today's date: 12/15/2022  Patient name: Mary Thompson  : 1955  MRN: [id number]  Referring provider: Ann Curtis DO  Dx:   Encounter Diagnosis     ICD-10-CM    1  Other closed nondisplaced fracture of proximal end of left humerus with routine healing, subsequent encounter  S42 295D           Start Time: 1015  Stop Time: 1045  Total time in clinic (min): 30 minutes    Assessment  Assessment details: Patient is a 74 y o  female who presents to outpatient PT IE s/p fall on 2022 with Left proximal humeral fracture  Patient is about 4 months post fracture at this time  Patient has attended PT for 23 visits thus far  Patient presents to re-evaluation with reports of improving pain levels to about 0-2/10 at this time, and improve AROM and PROM in the past few weeks  Patient reports improving ability to perform ADLs and household chores  She reports she is able to reach overhead and laterally better than when she started PT  She reports continued difficulty with reaching/lifting light objects, however has been improving recently  She reports she is able to lay on her Left shoulder without pain  Patient reports primary limitations and difficulty at this time is reaching behind her back and putting coat or sweater on  Patient continues to have limitations with reaching behind her back and head, which affects UE dressing and hygiene activities  However, mobility is steady improving since last assessed  This affects her efficiency and independency with certain ADLs  Slowly progressing strength training in PT as patient tolerates  Also progressing ROM exercises in PT to further improve overhead and lateral reaching ability  Patient was advised strongly to continue with self-stretches/AAROM exercises at home to assist with shoulder mobility in all directions for ADLs  Patient has reported improved ability with opening car door, which was her primary concern a few months ago   Patient is Right-handed and is thus is able to assist and perform most activities with use of Right hand  Patient otherwise reports she is independent with ADLs and self-care tasks, however lives with her mother who assists her in certain other tasks and household chores  Patient however continues to have difficulty with using her Left hand to assist with ADLs, household chores, and house maintenance activities that require use of Left or both hands  Patient would benefit from continued skilled PT services to further improve her Left shoulder mobility, improve UE strength, improve postural mechanics, reduce pain levels, and progress overall functional mobility in order to perform ADLs and self-care tasks more efficiently and easily and to overall maximize function  Please note that progress is slow at times due to patient's medical history and undergoing chemotherapy treatment at this time  Thank you for the referral     Impairments: abnormal muscle firing, abnormal or restricted ROM, abnormal movement, activity intolerance, impaired physical strength, lacks appropriate home exercise program, pain with function, scapular dyskinesis, poor posture  and poor body mechanics  Functional limitations: reaching overhead/laterally, lifting, opening car doors, UE dressingBarriers to therapy: Undergoing chemo, nephro stents, extensive medical hx  Understanding of Dx/Px/POC: good   Prognosis: good    Goals  Impairment Goals 4-6 weeks  In order to improve and maximize function patient will be able to     - Decrease pain intensity to <3/10  Ongoing  - Improve shoulder PROM to [facility] without pain throughout to improve flexibility of GHJ  Improving  - Improve shoulder AROM to [facility] without pain throughout to improve mobility of shoulder for varying reaching tasks  Improving  - Improved shoulder MMT to 4/5 to improve ability with lifting/carrying light to moderately heavier objects   New, 12/15/2022    Functional Goals 6-8 weeks  In order to improve and maximize function patient will be able to     - Return to Prior Level of Function with no greater than 2/10 pain   - Increase Functional Status Measure (FOTO) to > = expected at Discharge  Partially Met  - Be independent and compliant with HEP  Partially Met  - Have the ROM necessary for performing dressing/bathing activities without difficulty  Partially Met, difficulty with putting on coat  - Sleep throughout the night without disturbances due to pain  Met  - Improve ability to lift/carry light objects overhead for better ease with household chores and ADLs  Improved, on average 5lbs tolerated  - Improve ability to open car door without difficulty or pain to improve ease with getting into car or opening other doors  Partially Met, difficulty at times depending on angle      Plan  Patient would benefit from: skilled PT  Planned modality interventions: cryotherapy  Other planned modality interventions: moist heat  Planned therapy interventions: joint mobilization, manual therapy, neuromuscular re-education, patient education, strengthening, stretching, therapeutic activities, therapeutic exercise, home exercise program, functional ROM exercises, postural training, body mechanics training, flexibility, Paz taping and massage  Frequency: 1-2x week  Duration in weeks: 4  Treatment plan discussed with: patient        Subjective Evaluation    History of Present Illness  Date of onset: 8/13/2022  Mechanism of injury: trauma  Mechanism of injury: Mary Velez is a 74y o  year-old female who presents to outpatient PT s/p Left proximal humeral fracture that occurred on 8/13/2022  Patient reports she was home, she was wearing flipflops when her flipflop got stuck to a door  Patient reports she fell forward and onto Left shoulder  Patient reports she was able to get up by herself, and then called her sister  Patient did not want to go to ER, however was convinced by her sister to go on 8/15/2022  Patient received x-ray at the time that revealed proximal humeral fracture (see full report for details)  Patient was given sling and sent home  She had follow up on 2022 and received follow up x-rays again that stated fracture was healing well  Patient continues to be in sling at this time, with precautions for no strengthening for 6 weeks  Patient has follow up MD appointment on 10/4/2022  Patient is independent with most ADLs and self-care tasks, but requires assistance with putting her sling on  Patient is not driving at this time  Quality of life: good    Pain  Current pain ratin  At best pain ratin  At worst pain ratin  Location: L shoulder  Quality: dull ache  Relieving factors: medications and support  Aggravating factors: overhead activity and lifting  Progression: improved    Social Support  Lives with: mother  Hand dominance: right      Diagnostic Tests  X-ray: abnormal (8/15, : L shoulder)  Treatments  Previous treatment: immobilization  Current treatment: physical therapy  Patient Goals  Patient goals for therapy: decreased pain, increased motion, independence with ADLs/IADLs, return to sport/leisure activities and increased strength          Objective     Postural Observations  Seated posture: good  Standing posture: good    Additional Postural Observation Details  Left shoulder slightly guarded, slightly rounded/kyphotic thoracic posture    Palpation   Left   Muscle spasm in the anterior deltoid, middle deltoid and triceps  Tenderness of the anterior deltoid, middle deltoid and triceps  Tenderness     Left Shoulder   Tenderness in the [facility] joint, biceps tendon (proximal) and bicipital groove       Active Range of Motion   Left Shoulder   Flexion: 105 degrees   Extension: 60 degrees   Abduction: 95 degrees   External rotation 0°: 65 degrees   External rotation BTH: T2   Internal rotation BTB: L5     Right Shoulder   Normal active range of motion    Passive Range of Motion Left Shoulder   Flexion: 120 degrees   Abduction: 80 degrees   External rotation 0°: 60 degrees   External rotation 45°: 57 degrees     Right Shoulder   Normal passive range of motion    Scapular Mobility   Left Shoulder   Scapular mobility: fair    Additional Scapular Mobility Details  Passive mobility with significant stiffness in distraction, upward and downward rotations  Joint Play   Left Shoulder  Hypomobile in the anterior capsule, posterior capsule and inferior capsule  Strength/Myotome Testing     Left Shoulder     Planes of Motion   Flexion: 3+   Abduction: 3+   External rotation at 0°: 4-   Internal rotation at 0°: 3+     Isolated Muscles   Biceps: 4   Triceps: 4-     Right Shoulder   Normal muscle strength    Additional Strength Details  L shoulder MMT assessed at available AROM   Strength (Lv II in neutral): Left 20, 25, 20 lbs; Right 25, 20, 18 lbs       Subjective: Patient reports her shoulder is feeling better and has more mobility  She reports primary limitation at this time putting on her coat and lifting objects mild to moderately heavy  Patient requests to leave early after re-eval due to bad weather          Diagnosis: L proximal humerus fracture (DOI 8/13/2022)  Precautions: HTN, DM, hx of chemo, recent nephro stent (9/19/2022)  ( * astericks indicates given per HEP)    Manuals 11/25 11/28 11/30 12/8 12/12 12/15   STM DK DK DK DK DK DK   IASTM         PROM DK DK DK DK DK DK            Neuro Re-Ed         Mid Rows 2x10 RTB 2x10 GTB 2x10 GTB 2x10 GTB 2x10 GTB    Shoulder Ext 2x10 RTB 2x10 GTB 2x10 GTB 2x10 GTB 2x10 GTB    Web Slide IR/ER x15ea RTB 2x10 RTB ea 2x10 GTB ea 2x10 GTB ea 2x10 GTBea             Ther Ex         physioball rollouts seated         Table slides flex and ABD (standing)         AAROM ER with cane Seated 2x10 Seated 2x10 Seated 2x10 Seated 2x10 Seated 2x10    AAROM flexion with cane supine 2x10 supine 2x10 supine 2x10 supine 2x10 supine 2x10    AAROM ABD with cane Standing 2x10 Standing 2x10 Standing 2x10 Standing 2x10 Standing 2x10    Wall slides with pillow case Flex x15  Scap x 10 Flex 2x10  Scap x10 Flex 2x10  Scap x15 NV NV    Pulleys Seated flex + scap 5s x 15 Seated flex + scap 5s x 15 Seated flex + scap 5s, 2x10 Seated flex + scap 5s, 2x10 Seated flex + scap 5s, 2x10    IR stretch with strap  3" x15 standing 5" x15 standing NV NV             Ther Activity         UBE                  Pt Ed      HEP   Re-Evaluation      DK   Modalities         Heat/ice (prn)

## 2022-12-16 ENCOUNTER — HOSPITAL ENCOUNTER (OUTPATIENT)
Dept: NON INVASIVE DIAGNOSTICS | Facility: HOSPITAL | Age: 67
Discharge: HOME | End: 2022-12-16
Attending: OBSTETRICS & GYNECOLOGY

## 2022-12-16 VITALS
DIASTOLIC BLOOD PRESSURE: 80 MMHG | HEIGHT: 59 IN | BODY MASS INDEX: 23.18 KG/M2 | WEIGHT: 115 LBS | HEART RATE: 70 BPM | SYSTOLIC BLOOD PRESSURE: 112 MMHG

## 2022-12-16 DIAGNOSIS — C54.1 ENDOMETRIAL CANCER (HCC): ICD-10-CM

## 2022-12-16 LAB
APICAL FOUR CHAMBER EJECTION FRACTION: 58 %
ATRIAL RATE: 63 BPM
E WAVE DECELERATION TIME: 251 MS
FRACTIONAL SHORTENING: 33 (ref 28–44)
GLOBAL LONGITUIDAL STRAIN: -20 %
INTERVENTRICULAR SEPTUM IN DIASTOLE (PARASTERNAL SHORT AXIS VIEW): 1.1 CM
INTERVENTRICULAR SEPTUM: 1.1 CM (ref 0.6–1.1)
LEFT ATRIUM SIZE: 3.4 CM
LEFT INTERNAL DIMENSION IN SYSTOLE: 2.6 CM (ref 2.1–4)
LEFT VENTRICULAR INTERNAL DIMENSION IN DIASTOLE: 3.9 CM (ref 3.5–6)
LEFT VENTRICULAR POSTERIOR WALL IN END DIASTOLE: 1.2 CM
LEFT VENTRICULAR STROKE VOLUME: 40 ML
LVSV (TEICH): 40 ML
MV E'TISSUE VEL-SEP: 6 CM/S
MV PEAK A VEL: 0.65 M/S
MV PEAK E VEL: 42 CM/S
MV STENOSIS PRESSURE HALF TIME: 73 MS
MV VALVE AREA P 1/2 METHOD: 3.01
P AXIS: 39 DEGREES
PR INTERVAL: 142 MS
QRS AXIS: -39 DEGREES
QRSD INTERVAL: 96 MS
QT INTERVAL: 406 MS
QTC INTERVAL: 415 MS
SL CV LV EF: 60
SL CV PED ECHO LEFT VENTRICLE DIASTOLIC VOLUME (MOD BIPLANE) 2D: 66 ML
SL CV PED ECHO LEFT VENTRICLE SYSTOLIC VOLUME (MOD BIPLANE) 2D: 25 ML
T WAVE AXIS: 43 DEGREES
TR MAX PG: 15 MMHG
TR PEAK VELOCITY: 1.9 M/S
TRICUSPID VALVE PEAK REGURGITATION VELOCITY: 1.9 M/S
VENTRICULAR RATE: 63 BPM

## 2022-12-19 ENCOUNTER — OFFICE VISIT (OUTPATIENT)
Dept: PHYSICAL THERAPY | Facility: CLINIC | Age: 67
End: 2022-12-19

## 2022-12-19 ENCOUNTER — HOSPITAL ENCOUNTER (OUTPATIENT)
Dept: CT IMAGING | Facility: HOSPITAL | Age: 67
Discharge: HOME | End: 2022-12-19
Attending: OBSTETRICS & GYNECOLOGY

## 2022-12-19 DIAGNOSIS — C54.1 ENDOMETRIAL CANCER (HCC): ICD-10-CM

## 2022-12-19 DIAGNOSIS — S42.295D OTHER CLOSED NONDISPLACED FRACTURE OF PROXIMAL END OF LEFT HUMERUS WITH ROUTINE HEALING, SUBSEQUENT ENCOUNTER: Primary | ICD-10-CM

## 2022-12-19 RX ADMIN — IOHEXOL 85 ML: 350 INJECTION, SOLUTION INTRAVENOUS at 09:03

## 2022-12-19 NOTE — PROGRESS NOTES
Daily Note     Today's date: 2022  Patient name: Vesna Jama  : 1955  MRN: [id number]  Referring provider: Perla Fernandez DO  Dx:   Encounter Diagnosis     ICD-10-CM    1  Other closed nondisplaced fracture of proximal end of left humerus with routine healing, subsequent encounter  S42 295D           Start Time: 1400  Stop Time: 1445  Total time in clinic (min): 45 minutes    Subjective: Patient reports she feels better compared to last week  She reports some continued soreness and weakness, but not as bad  Objective: See treatment diary below      Assessment: Tolerated treatment well  Continues to have limitations in overhead reaching/forward flexion, however steadily improving compared to previous visits  Improved AAROM into flexion noted in supine and with pulley  Added shoulder shelf taps for progressing overhead reaching  Patient had difficulty reaching past 120 deg flexion  Completed all other exercises without increased pain or difficulty  Patient exhibited good technique with therapeutic exercises and would benefit from continued PT      Plan: Continue per plan of care  Progress treatment as tolerated         Diagnosis: L proximal humerus fracture (DOI 2022)  Precautions: HTN, DM, hx of chemo, recent nephro stent (2022)  ( * astericks indicates given per HEP)    Manuals 11/25 11/28 11/30 12/8 12/12 12/15 12/19   STM DK DK DK DK DK DK DK   IASTM          PROM DK DK DK DK DK DK DK             Neuro Re-Ed          Mid Rows 2x10 RTB 2x10 GTB 2x10 GTB 2x10 GTB 2x10 GTB     Shoulder Ext 2x10 RTB 2x10 GTB 2x10 GTB 2x10 GTB 2x10 GTB     Web Slide IR/ER x15ea RTB 2x10 RTB ea 2x10 GTB ea 2x10 GTB ea 2x10 GTBea  2x10 GTBea             Ther Ex          physioball rollouts seated          Table slides flex and ABD (standing)          AAROM ER with cane Seated 2x10 Seated 2x10 Seated 2x10 Seated 2x10 Seated 2x10  Seated 2x10   AAROM flexion with cane supine 2x10 supine 2x10 supine 2x10 supine 2x10 supine 2x10  supine 2x10   AAROM ABD with cane Standing 2x10 Standing 2x10 Standing 2x10 Standing 2x10 Standing 2x10  Standing 2x10   Wall slides with pillow case Flex x15  Scap x 10 Flex 2x10  Scap x10 Flex 2x10  Scap x15 NV NV  Flex 2x10  Scap x15   Pulleys Seated flex + scap 5s x 15 Seated flex + scap 5s x 15 Seated flex + scap 5s, 2x10 Seated flex + scap 5s, 2x10 Seated flex + scap 5s, 2x10  Seated flex + scap 5s, 2x10   IR stretch with strap  3" x15 standing 5" x15 standing NV NV  5" x15 standing             Ther Activity          UBE          Shoulder Shelf taps Flexion       #1 x10   Pt Ed      HEP    Re-Evaluation      DK    Modalities          Heat/ice (prn)

## 2022-12-20 ENCOUNTER — HOSPITAL ENCOUNTER (OUTPATIENT)
Dept: RADIOLOGY | Facility: HOSPITAL | Age: 67
Discharge: HOME | End: 2022-12-20
Attending: RADIOLOGY

## 2022-12-20 VITALS
HEIGHT: 59 IN | HEART RATE: 97 BPM | SYSTOLIC BLOOD PRESSURE: 94 MMHG | RESPIRATION RATE: 16 BRPM | BODY MASS INDEX: 23.18 KG/M2 | DIASTOLIC BLOOD PRESSURE: 55 MMHG | WEIGHT: 115 LBS | TEMPERATURE: 98.2 F | OXYGEN SATURATION: 100 %

## 2022-12-20 DIAGNOSIS — C54.1 ENDOMETRIAL CANCER (HCC): Primary | ICD-10-CM

## 2022-12-20 DIAGNOSIS — N13.5 OBSTRUCTION OF RIGHT URETER: ICD-10-CM

## 2022-12-20 RX ORDER — LIDOCAINE HYDROCHLORIDE 10 MG/ML
INJECTION, SOLUTION EPIDURAL; INFILTRATION; INTRACAUDAL; PERINEURAL AS NEEDED
Status: COMPLETED | OUTPATIENT
Start: 2022-12-20 | End: 2022-12-20

## 2022-12-20 RX ORDER — FENTANYL CITRATE 50 UG/ML
INJECTION, SOLUTION INTRAMUSCULAR; INTRAVENOUS AS NEEDED
Status: COMPLETED | OUTPATIENT
Start: 2022-12-20 | End: 2022-12-20

## 2022-12-20 RX ORDER — MIDAZOLAM HYDROCHLORIDE 2 MG/2ML
INJECTION, SOLUTION INTRAMUSCULAR; INTRAVENOUS AS NEEDED
Status: COMPLETED | OUTPATIENT
Start: 2022-12-20 | End: 2022-12-20

## 2022-12-20 RX ORDER — SODIUM CHLORIDE 9 MG/ML
75 INJECTION, SOLUTION INTRAVENOUS CONTINUOUS
Status: DISCONTINUED | OUTPATIENT
Start: 2022-12-20 | End: 2022-12-21 | Stop reason: HOSPADM

## 2022-12-20 RX ORDER — LEVOFLOXACIN 5 MG/ML
500 INJECTION, SOLUTION INTRAVENOUS ONCE
Status: COMPLETED | OUTPATIENT
Start: 2022-12-20 | End: 2022-12-20

## 2022-12-20 RX ADMIN — MIDAZOLAM 0.5 MG: 1 INJECTION INTRAMUSCULAR; INTRAVENOUS at 10:43

## 2022-12-20 RX ADMIN — MIDAZOLAM 1 MG: 1 INJECTION INTRAMUSCULAR; INTRAVENOUS at 10:32

## 2022-12-20 RX ADMIN — FENTANYL CITRATE 50 MCG: 50 INJECTION INTRAMUSCULAR; INTRAVENOUS at 10:43

## 2022-12-20 RX ADMIN — LEVOFLOXACIN 500 MG: 500 INJECTION, SOLUTION INTRAVENOUS at 10:17

## 2022-12-20 RX ADMIN — FENTANYL CITRATE 50 MCG: 50 INJECTION INTRAMUSCULAR; INTRAVENOUS at 10:32

## 2022-12-20 RX ADMIN — MIDAZOLAM 0.5 MG: 1 INJECTION INTRAMUSCULAR; INTRAVENOUS at 10:45

## 2022-12-20 RX ADMIN — LIDOCAINE HYDROCHLORIDE 10 ML: 10 INJECTION, SOLUTION EPIDURAL; INFILTRATION; INTRACAUDAL; PERINEURAL at 10:41

## 2022-12-20 RX ADMIN — IOHEXOL 15 ML: 350 INJECTION, SOLUTION INTRAVENOUS at 10:50

## 2022-12-20 NOTE — DISCHARGE INSTRUCTIONS
Drainage Tube Removal    Your drainage tube was removed today  What you need know at home:   Keep a clean dry dressing at the tube site until the small opening closes  It will take twenty four to forty eight hours  Keep the site dry until it heals  A small amount of drainage on your dressing is normal  Resume your normal diet  Small sips of flat soda will help with any nausea  Contact Interventional Radiology for any of the following: You have pain, fever greater than 101, shaking chills  If you have increased redness or swelling at the site  I the drainage from your site does not stop  If the site drains pus or has a bad odor  Contact Interventional Radiology at [phone]   Angelique PATIENTS: Contact Interventional Radiology at [phone]) Sharif Janeth PATIENTS: Contact Interventional Radiology at [phone]) if:        Moderate Sedation   WHAT YOU NEED TO KNOW:   Moderate sedation, or conscious sedation, is medicine used during procedures to help you feel relaxed and calm  You will be awake and able to follow directions without anxiety or pain  You will remember little to none of the procedure  You may feel tired, weak, or unsteady on your feet after you get sedation  You may also have trouble concentrating or short-term memory loss  These symptoms should go away in 24 hours or less  DISCHARGE INSTRUCTIONS:   Call 911 or have someone else call for any of the following: You have sudden trouble breathing  You cannot be woken  Seek care immediately if:   You have a severe headache or dizziness  Your heart is beating faster than usual   Contact your healthcare provider if:   You have a fever  You have nausea or are vomiting for more than 8 hours after the procedure  Your skin is itchy, swollen, or you have a rash  You have questions or concerns about your condition or care    Self-care:   Have someone stay with you for 24 hours  This person can drive you to errands and help you do things around the house  This person can also watch for problems  Rest and do quiet activities for 24 hours  Do not exercise, ride a bike, or play sports  Stand up slowly to prevent dizziness and falls  Take short walks around the house with another person  Slowly return to your usual activities the next day  Do not drive or use dangerous machines or tools for 24 hours  You may injure yourself or others  Examples include a lawnmower, saw, or drill  Do not return to work for 24 hours if you use dangerous machines or tools for work  Do not make important decisions for 24 hours  For example, do not sign important papers or invest money  Drink liquids as directed  Liquids help flush the sedation medicine out of your body  Ask how much liquid to drink each day and which liquids are best for you  Eat small, frequent meals to prevent nausea and vomiting  Start with clear liquids such as juice or broth  If you do not vomit after clear liquids, you can eat your usual foods  Do not drink alcohol or take medicines that make you drowsy  This includes medicines that help you sleep and anxiety medicines  Ask your healthcare provider if it is safe for you to take pain medicine  Follow up with your healthcare provider as directed: Write down your questions so you remember to ask them during your visits  © 2017 [employer] Information is for End User's use only and may not be sold, redistributed or otherwise used for commercial purposes  All illustrations and images included in CareNotes® are the copyrighted property of [employer]  or [employer]  The above information is an  only  It is not intended as medical advice for individual conditions or treatments   Talk to your doctor, nurse or pharmacist before following any medical regimen to see if it is safe and effective for you

## 2022-12-20 NOTE — SEDATION DOCUMENTATION
PCNU conversion to double J stent completed by Dr Arpan Sanders without complications  Patient tolerated procedure well  Gauze over nephrostomy tube site  Bedrest start time 1047  Report given to [facility]

## 2022-12-22 ENCOUNTER — APPOINTMENT (OUTPATIENT)
Dept: PHYSICAL THERAPY | Facility: CLINIC | Age: 67
End: 2022-12-22

## 2022-12-23 ENCOUNTER — HOSPITAL ENCOUNTER (OUTPATIENT)
Dept: INFUSION CENTER | Facility: HOSPITAL | Age: 67
End: 2022-12-23

## 2022-12-23 ENCOUNTER — TELEPHONE (OUTPATIENT)
Dept: NUTRITION | Facility: CLINIC | Age: 67
End: 2022-12-23

## 2022-12-23 NOTE — TELEPHONE ENCOUNTER
Voice message received from pt requesting a call back to discuss her next appt since she will not be getting chemo the same day anymore  Called pt back to discuss  Left a voice message requesting a call back

## 2022-12-27 ENCOUNTER — HOSPITAL ENCOUNTER (OUTPATIENT)
Dept: INFUSION CENTER | Facility: HOSPITAL | Age: 67
End: 2022-12-27

## 2022-12-27 ENCOUNTER — TELEPHONE (OUTPATIENT)
Dept: GYNECOLOGIC ONCOLOGY | Facility: CLINIC | Age: 67
End: 2022-12-27

## 2022-12-27 ENCOUNTER — DOCUMENTATION (OUTPATIENT)
Dept: HEMATOLOGY ONCOLOGY | Facility: CLINIC | Age: 67
End: 2022-12-27

## 2022-12-27 NOTE — TELEPHONE ENCOUNTER
----- Message from Elijerman Cuello sent at 12/27/2022  2:39 PM EST -----  Regarding: FW: Lovenox  Contact: [phone]  I just called her insurance company and this is pending under urgency    ----- Message -----  From: Ivelisse Winston  Sent: 12/27/2022   1:44 PM EST  To: Kelley Saeed  Subject: FW: Lovenox                                        ----- Message -----  From: RAFITA Palomino  Sent: 12/27/2022   1:32 PM EST  To: [facility]  Subject: FW: Lovenox                                      Let me know if there is anything that we need to do on our end  I know we've had issues with her Lovenox auth in the past     ----- Message -----  From: David Nuñez  Sent: 12/27/2022   1:12 PM EST  To: RAFITA Palomino  Subject: FW: Lovenox                                        ----- Message -----  From: Gisselle Nevarez  Sent: 12/27/2022   1:08 PM EST  To: [facility]  Subject: Lovenox                                          I got a letter from the prescription company regarding prior authorization for Lovenox  They said the current authorization is about to run out  and for the doctors office to apply for a new authorization of the Lovenox  Please let me know if they’re is anything else you need from me  Thanks!     Partha Cesar

## 2022-12-27 NOTE — PROGRESS NOTES
Received request from patient that she will need a new PA for her lovenox soon  I called [employer] at  [phone] and spoke with Naresh Fallon who stated this is pending and marked urgent   I also faxed in all clinical labs and notes to [phone] and the PA reference number is [id number]

## 2022-12-28 ENCOUNTER — OFFICE VISIT (OUTPATIENT)
Dept: PHYSICAL THERAPY | Facility: CLINIC | Age: 67
End: 2022-12-28

## 2022-12-28 DIAGNOSIS — S42.295D OTHER CLOSED NONDISPLACED FRACTURE OF PROXIMAL END OF LEFT HUMERUS WITH ROUTINE HEALING, SUBSEQUENT ENCOUNTER: Primary | ICD-10-CM

## 2022-12-28 NOTE — PROGRESS NOTES
Daily Note     Today's date: 2022  Patient name: Isacc Borjas  : 1955  MRN: [id number]  Referring provider: Dave Washington DO  Dx:   Encounter Diagnosis     ICD-10-CM    1  Other closed nondisplaced fracture of proximal end of left humerus with routine healing, subsequent encounter  S42 295D           Start Time: 1430  Stop Time: 1515  Total time in clinic (min): 45 minutes    Subjective: Patient reports she is feeling overall better, her appetite has improved  Patient reports shoulder is feeling better as well, however putting on coat is still difficult at times  Objective: See treatment diary below      Assessment: Tolerated treatment well  Patient presents with improved tolerance to PROM of Left shoulder  Patient presents with improving supine shoulder flexion AAROM  Added standing AAROM with stick, with improving mobility and no significant increase in pain  Performed shoulder taps on shelves with 1#, without significant pain, but difficulty with reaching top shelf  Some soreness noted after wall slides, but reduced pain with rest  Patient reported overall improved tolerance and reduced stiffness end of session compared to on arrival  Patient exhibited good technique with therapeutic exercises and would benefit from continued PT      Plan: Continue per plan of care  Progress treatment as tolerated         Diagnosis: L proximal humerus fracture (DOI 2022)  Precautions: HTN, DM, hx of chemo, recent nephro stent (2022)  ( * astericks indicates given per HEP)    Manuals 11/25 11/28 11/30 12/8 12/12 12/15 12/19 12/28   STM DK DK DK DK DK DK DK DK   IASTM           PROM DK DK DK DK DK DK DK DK              Neuro Re-Ed           Mid Rows 2x10 RTB 2x10 GTB 2x10 GTB 2x10 GTB 2x10 GTB      Shoulder Ext 2x10 RTB 2x10 GTB 2x10 GTB 2x10 GTB 2x10 GTB      Web Slide IR/ER x15ea RTB 2x10 RTB ea 2x10 GTB ea 2x10 GTB ea 2x10 GTBea  2x10 GTBea 2x10 GTBea              Ther Ex           physioball rollouts seated           Table slides flex and ABD (standing)           AAROM ER with cane Seated 2x10 Seated 2x10 Seated 2x10 Seated 2x10 Seated 2x10  Seated 2x10 Seated 2x10   AAROM flexion with cane supine 2x10 supine 2x10 supine 2x10 supine 2x10 supine 2x10  supine 2x10 supine 2x10, stand x10   AAROM ABD with cane Standing 2x10 Standing 2x10 Standing 2x10 Standing 2x10 Standing 2x10  Standing 2x10 Standing 2x10   Wall slides with pillow case Flex x15  Scap x 10 Flex 2x10  Scap x10 Flex 2x10  Scap x15 NV NV  Flex 2x10  Scap x15 Flex 2x10  Scap x15   Pulleys Seated flex + scap 5s x 15 Seated flex + scap 5s x 15 Seated flex + scap 5s, 2x10 Seated flex + scap 5s, 2x10 Seated flex + scap 5s, 2x10  Seated flex + scap 5s, 2x10 Seated flex + scap 5s, 2x10   IR stretch with strap  3" x15 standing 5" x15 standing NV NV  5" x15 standing               Ther Activity           UBE           Shoulder Shelf taps Flexion       #1 x10 #1 2x5   Pt Ed      HEP     Re-Evaluation      DK     Modalities           Heat/ice (prn)

## 2022-12-29 ENCOUNTER — TELEPHONE (OUTPATIENT)
Dept: GYNECOLOGIC ONCOLOGY | Facility: CLINIC | Age: 67
End: 2022-12-29

## 2022-12-29 ENCOUNTER — APPOINTMENT (OUTPATIENT)
Dept: PHYSICAL THERAPY | Facility: CLINIC | Age: 67
End: 2022-12-29

## 2022-12-29 NOTE — TELEPHONE ENCOUNTER
Jim salazar by Delmy Ernandez called in regarding the patient and some clinical trial questions that she had   Jim Raymundo can be contacted back @ [phone]

## 2022-12-30 ENCOUNTER — APPOINTMENT (OUTPATIENT)
Dept: LAB | Facility: CLINIC | Age: 67
End: 2022-12-30

## 2022-12-30 ENCOUNTER — OFFICE VISIT (OUTPATIENT)
Dept: GYNECOLOGIC ONCOLOGY | Facility: CLINIC | Age: 67
End: 2022-12-30

## 2022-12-30 ENCOUNTER — DOCUMENTATION (OUTPATIENT)
Dept: OTHER | Facility: HOSPITAL | Age: 67
End: 2022-12-30

## 2022-12-30 ENCOUNTER — OFFICE VISIT (OUTPATIENT)
Dept: PHYSICAL THERAPY | Facility: CLINIC | Age: 67
End: 2022-12-30

## 2022-12-30 ENCOUNTER — HOSPITAL ENCOUNTER (OUTPATIENT)
Dept: INFUSION CENTER | Facility: CLINIC | Age: 67
End: 2022-12-30

## 2022-12-30 VITALS
OXYGEN SATURATION: 100 % | DIASTOLIC BLOOD PRESSURE: 60 MMHG | HEART RATE: 98 BPM | BODY MASS INDEX: 24.09 KG/M2 | TEMPERATURE: 98.7 F | SYSTOLIC BLOOD PRESSURE: 98 MMHG | RESPIRATION RATE: 18 BRPM | WEIGHT: 119.5 LBS | HEIGHT: 59 IN

## 2022-12-30 DIAGNOSIS — S42.295D OTHER CLOSED NONDISPLACED FRACTURE OF PROXIMAL END OF LEFT HUMERUS WITH ROUTINE HEALING, SUBSEQUENT ENCOUNTER: Primary | ICD-10-CM

## 2022-12-30 DIAGNOSIS — M80.00XS OSTEOPOROSIS WITH CURRENT PATHOLOGICAL FRACTURE, UNSPECIFIED OSTEOPOROSIS TYPE, SEQUELA: ICD-10-CM

## 2022-12-30 DIAGNOSIS — T45.1X5A CHEMOTHERAPY INDUCED NEUTROPENIA (HCC): ICD-10-CM

## 2022-12-30 DIAGNOSIS — I82.4Y1 ACUTE DEEP VEIN THROMBOSIS (DVT) OF PROXIMAL VEIN OF RIGHT LOWER EXTREMITY (HCC): ICD-10-CM

## 2022-12-30 DIAGNOSIS — E83.42 HYPOMAGNESEMIA: ICD-10-CM

## 2022-12-30 DIAGNOSIS — C54.1 ENDOMETRIAL CANCER (HCC): Primary | ICD-10-CM

## 2022-12-30 DIAGNOSIS — C54.1 ENDOMETRIAL CANCER (HCC): Primary | Chronic | ICD-10-CM

## 2022-12-30 DIAGNOSIS — D70.1 CHEMOTHERAPY INDUCED NEUTROPENIA (HCC): ICD-10-CM

## 2022-12-30 LAB
ALBUMIN SERPL BCP-MCNC: 3.7 G/DL (ref 3.5–5)
ALP SERPL-CCNC: 98 U/L (ref 34–104)
ALT SERPL W P-5'-P-CCNC: 9 U/L (ref 7–52)
AMYLASE SERPL-CCNC: 34 IU/L (ref 29–103)
ANION GAP SERPL CALCULATED.3IONS-SCNC: 9 MMOL/L (ref 4–13)
APTT PPP: 52 SECONDS (ref 23–37)
AST SERPL W P-5'-P-CCNC: 10 U/L (ref 13–39)
BACTERIA UR QL AUTO: ABNORMAL /HPF
BASOPHILS # BLD AUTO: 0.03 THOUSANDS/ÂΜL (ref 0–0.1)
BASOPHILS NFR BLD AUTO: 1 % (ref 0–1)
BILIRUB SERPL-MCNC: 0.24 MG/DL (ref 0.2–1)
BILIRUB UR QL STRIP: NEGATIVE
BUN SERPL-MCNC: 25 MG/DL (ref 5–25)
CALCIUM SERPL-MCNC: 9.2 MG/DL (ref 8.4–10.2)
CHLORIDE SERPL-SCNC: 107 MMOL/L (ref 96–108)
CHOLEST SERPL-MCNC: 197 MG/DL
CLARITY UR: ABNORMAL
CO2 SERPL-SCNC: 22 MMOL/L (ref 21–32)
COLOR UR: ABNORMAL
CREAT SERPL-MCNC: 1.13 MG/DL (ref 0.6–1.3)
CREAT UR-MCNC: 28.9 MG/DL
EOSINOPHIL # BLD AUTO: 0 THOUSAND/ÂΜL (ref 0–0.61)
EOSINOPHIL NFR BLD AUTO: 0 % (ref 0–6)
ERYTHROCYTE [DISTWIDTH] IN BLOOD BY AUTOMATED COUNT: 14.2 % (ref 11.6–15.1)
GFR SERPL CREATININE-BSD FRML MDRD: 51 ML/MIN/1.73SQ M
GGT SERPL-CCNC: <7 U/L (ref 5–85)
GLUCOSE P FAST SERPL-MCNC: 86 MG/DL (ref 65–99)
GLUCOSE SERPL-MCNC: 86 MG/DL (ref 65–140)
GLUCOSE UR STRIP-MCNC: NEGATIVE MG/DL
HCT VFR BLD AUTO: 28.7 % (ref 34.8–46.1)
HGB BLD-MCNC: 9.3 G/DL (ref 11.5–15.4)
HGB UR QL STRIP.AUTO: ABNORMAL
IMM GRANULOCYTES # BLD AUTO: 0.06 THOUSAND/UL (ref 0–0.2)
IMM GRANULOCYTES NFR BLD AUTO: 1 % (ref 0–2)
INR PPP: 0.99 (ref 0.84–1.19)
KETONES UR STRIP-MCNC: NEGATIVE MG/DL
LEUKOCYTE ESTERASE UR QL STRIP: ABNORMAL
LIPASE SERPL-CCNC: 92 U/L (ref 11–82)
LYMPHOCYTES # BLD AUTO: 0.65 THOUSANDS/ÂΜL (ref 0.6–4.47)
LYMPHOCYTES NFR BLD AUTO: 10 % (ref 14–44)
MAGNESIUM SERPL-MCNC: 1.7 MG/DL (ref 1.9–2.7)
MCH RBC QN AUTO: 31.1 PG (ref 26.8–34.3)
MCHC RBC AUTO-ENTMCNC: 32.4 G/DL (ref 31.4–37.4)
MCV RBC AUTO: 96 FL (ref 82–98)
MONOCYTES # BLD AUTO: 0.45 THOUSAND/ÂΜL (ref 0.17–1.22)
MONOCYTES NFR BLD AUTO: 7 % (ref 4–12)
NEUTROPHILS # BLD AUTO: 5.11 THOUSANDS/ÂΜL (ref 1.85–7.62)
NEUTS SEG NFR BLD AUTO: 81 % (ref 43–75)
NITRITE UR QL STRIP: NEGATIVE
NON-SQ EPI CELLS URNS QL MICRO: ABNORMAL /HPF
NRBC BLD AUTO-RTO: 0 /100 WBCS
PH UR STRIP.AUTO: 5.5 [PH]
PHOSPHATE SERPL-MCNC: 4.2 MG/DL (ref 2.3–4.1)
PLATELET # BLD AUTO: 273 THOUSANDS/UL (ref 149–390)
PMV BLD AUTO: 10 FL (ref 8.9–12.7)
POTASSIUM SERPL-SCNC: 4.2 MMOL/L (ref 3.5–5.3)
PROT SERPL-MCNC: 6.7 G/DL (ref 6.4–8.4)
PROT UR STRIP-MCNC: ABNORMAL MG/DL
PROT UR-MCNC: 209 MG/DL
PROT/CREAT UR: 7.23 MG/G{CREAT} (ref 0–0.1)
PROTHROMBIN TIME: 13.3 SECONDS (ref 11.6–14.5)
RBC # BLD AUTO: 2.99 MILLION/UL (ref 3.81–5.12)
RBC #/AREA URNS AUTO: ABNORMAL /HPF
SODIUM SERPL-SCNC: 138 MMOL/L (ref 135–147)
SP GR UR STRIP.AUTO: 1.01 (ref 1–1.03)
T3 SERPL-MCNC: 1.4 NG/ML (ref 0.6–1.8)
T4 FREE SERPL-MCNC: 1.06 NG/DL (ref 0.76–1.46)
TSH SERPL DL<=0.05 MIU/L-ACNC: 2.58 UIU/ML (ref 0.45–4.5)
UROBILINOGEN UR STRIP-ACNC: <2 MG/DL
WBC # BLD AUTO: 6.3 THOUSAND/UL (ref 4.31–10.16)
WBC #/AREA URNS AUTO: ABNORMAL /HPF
WBC CLUMPS # UR AUTO: PRESENT /UL

## 2022-12-30 NOTE — PROGRESS NOTES
Assessment/Plan:    Problem List Items Addressed This Visit        Cardiovascular and Mediastinum    Acute deep vein thrombosis (DVT) of proximal vein of lower extremity (HCC)     Patient currently SABAS  Will continue Lovenox for at least another month, and consider discontinuing at her next follow-up visit in 4 weeks  Genitourinary    Endometrial cancer ([place] [address] ) - Primary (Chronic)     74year-old with recurrent stage IB endometrial cancer who completed the EndoBARR trial on 12/5/22 after 35 cycles  She is feeling well and has no acute concerns  Her PCN was internalized (coverted to double J stent)  on 12/20  She has some urinary incontinence but no pain or other issues  PS is 1  Patient will return to the office for 1 month f/u as per clinical trial protocol  She will call in the interim with any concerns  CHIEF COMPLAINT: End of clinical trial f/u      Subjective:     Problem:  Cancer Staging   Endometrial cancer ([place] [address] )  Staging form: Corpus Uteri - Carcinoma, AJCC 7th Edition  - Clinical stage from 12/19/2017: FIGO Stage IB (T1b, N0, M0) - Signed by Mariana Muniz MD on 2/12/2018      Previous therapy:  Oncology History   Endometrial cancer ([place] [address] )   11/17/2017 Initial Diagnosis    Endometrial cancer ([place] [address] )     11/17/2017 Biopsy    ENDOMETRIAL BIOPSY: WELL DIFFERENTIATED ENDOMETRIAL ADENOCARCINOMA (FIGO I) WITH FOCALMUCINOUS FEATURES  Part B: ENDOCERVICAL POLYP:BENIGN ENDOCERVICAL      12/19/2017 Surgery    Robotic assisted total laparoscopic hysterectomy with bilateral salpingo-oophorectomy and sentinel bilateral pelvic lymph node dissection  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 (4 4 x 3 2 cm tumor, 9 4/15 4mm invasion, NO LVSI, washings revealed atypical cellular changes)     12/19/2017 Genetic Testing    Morrison testing negative     6/28/2019 Biopsy    A  Breast, Right, US BX Right Breast 1000 4 cmfn:  - Benign breast tissue with focal histiocytic aggregate    See comment   - Negative for atypia and in-situ or invasive carcinoma  7/8/2019 Recurrence    Presented with right lower extremity DVT and CT demonstrating right pelvic sidewall mass with venous, ureteral and nerve compression causing significant neuropathic pain  Biopsy:  Lymph Node, Right pelvic lymph node x3:  - High-grade adenocarcinoma  7/30/2019 - 1/6/2020 Chemotherapy    Taxol 175 mg/m2 and carboplatin AUC 6 every 21 days  Dose was reduced to taxol 135 mg/m2 and carboplatin AUC 5  Completed 6 cycles  Treatment protracted due to multiple hospital admissions  2/24/2020 - 4/13/2020 Radiation    adjuvant external beam radiation therapy to the whole pelvis to 4500 cGy followed by boost to gross disease of an additional 2200 cGy     11/23/2020 Progression    New necrotic adenopathy in the retroperitoneum on CT      12/21/2020 -  Chemotherapy    Began chemotherapy with rucaparib, atezolizumab, and bevacizumab as per [facility] clinical trial     Rucaparib held cycle 28 secondary to fatigue  Avastin held since cycle 30 for elevated UPC  Treatment held for one cycle after cycle 31 for mucositis/fatigue             Patient ID: Beatriz Dhillon is a 74 y o  female     Patient presents for evaluation without interval concerns  She has been afebrile, and without nausea or vomiting  She has a normal appetite  She denies bowel or bladder dysfunction, and has no pain in the abdomen or pelvis  She is without vaginal bleeding or discharge  She is ambulatory and independent with all ADL's  Review of Systems   Constitutional: Positive for fatigue  Negative for activity change, appetite change, chills, fever and unexpected weight change  HENT: Negative for mouth sores  Eyes: Negative  Respiratory: Negative for cough, chest tightness, shortness of breath and wheezing  Cardiovascular: Negative for chest pain, palpitations and leg swelling  Gastrointestinal: Positive for constipation   Negative for abdominal distention, abdominal pain, anal bleeding, blood in stool, diarrhea, nausea and vomiting  Endocrine: Negative  Genitourinary: Negative for difficulty urinating, dysuria, flank pain, frequency, hematuria, pelvic pain, urgency, vaginal bleeding, vaginal discharge and vaginal pain  Musculoskeletal: Negative for arthralgias and myalgias  Skin: Negative for color change, pallor and rash  Neurological: Negative for dizziness, weakness, numbness and headaches  Hematological: Negative  Psychiatric/Behavioral: The patient is not nervous/anxious  Current Outpatient Medications   Medication Sig Dispense Refill   • amoxicillin (AMOXIL) 500 mg capsule      • ARIPiprazole (ABILIFY) 20 MG tablet Take 20 mg by mouth in the morning  • aspirin (ECOTRIN LOW STRENGTH) 81 mg EC tablet Take 81 mg by mouth daily     • BD PosiFlush 0 9 % SOLN      • Calcium-Magnesium-Vitamin D (CALCIUM 500 PO) Take by mouth daily      • cholecalciferol (VITAMIN D3) 1,000 units tablet Take 1,000 Units by mouth daily     • clotrimazole-betamethasone (LOTRISONE) 1-0 05 % cream Apply topically 2 (two) times a day 30 g 0   • Cranberry-Vitamin C 250-60 MG CAPS Take 1 tablet by mouth in the morning 90 capsule 3   • Cyanocobalamin (VITAMIN B12 PO) Take by mouth daily      • dronabinol (MARINOL) 2 5 mg capsule Take 1 capsule (2 5 mg total) by mouth 2 (two) times a day before meals 30 capsule 0   • enoxaparin (LOVENOX) 80 mg/0 8 mL Inject 0 8 mL (80 mg total) under the skin every 24 hours 72 mL 1   • estradiol (ESTRACE VAGINAL) 0 1 mg/g vaginal cream Insert 1g nightly for 6 weeks, then 1-2x weekly   45 3 g 1   • folic acid (FOLVITE) 1 mg tablet Take 1 tablet (1 mg total) by mouth daily  0   • gemfibrozil (LOPID) 600 mg tablet TAKE 1 TABLET BY MOUTH EVERY DAY 90 tablet 0   • Gemtesa 75 MG TABS TAKE 75 MG BY MOUTH IN THE MORNING 90 tablet 2   • lisinopril (ZESTRIL) 5 mg tablet Take 1 tablet (5 mg total) by mouth daily 90 tablet 3 • LORazepam (ATIVAN) 0 5 mg tablet Take 1 tablet (0 5 mg total) by mouth every 6 (six) hours as needed for anxiety (or nausea) 36 tablet 1   • methylprednisolone (MEDROL) 4 mg tablet Take 1 tablet (4 mg total) by mouth in the morning Take 2 tables once a day for 5 days, take 1 tablet once a day for 5 days, take 1 tablet every other day for 14 days  21 tablet 0   • Multiple Vitamin (MULTIVITAMIN) tablet Take 1 tablet by mouth daily     • naloxone (NARCAN) 4 mg/0 1 mL nasal spray Administer 1 spray into a nostril  If no response after 2-3 minutes, give another dose in the other nostril using a new spray  1 each 0   • omeprazole (PriLOSEC) 20 mg delayed release capsule Take 20 mg by mouth daily     • ondansetron (ZOFRAN) 8 mg tablet Take 1 tablet (8 mg total) by mouth every 8 (eight) hours as needed for nausea or vomiting 30 tablet 1   • oxybutynin (DITROPAN XL) 15 MG 24 hr tablet Take 1 tablet (15 mg total) by mouth daily at bedtime 90 tablet 3   • rucaparib (RUBRACA) 300 mg tablet Take 600 mg by mouth every 12 (twelve) hours Take with or without food  Do not repeat a vomited dose  • saccharomyces boulardii (FLORASTOR) 250 mg capsule Take 1 capsule (250 mg total) by mouth 2 (two) times a day  0   • senna (SENOKOT) 8 6 MG tablet Take 1 tablet (8 6 mg total) by mouth 2 (two) times a day as needed for constipation 60 tablet 0   • sodium chloride, PF, 0 9 % 10 mL by Intracatheter route daily 300 mL 3   • venlafaxine (EFFEXOR) 75 mg tablet Take 75 mg by mouth 3 (three) times a day       • ergocalciferol (VITAMIN D2) 50,000 units Take 1 capsule (50,000 Units total) by mouth once a week for 6 doses (Patient not taking: Reported on 11/29/2022) 6 capsule 0     No current facility-administered medications for this visit         Allergies   Allergen Reactions   • Cephalosporins Rash     Which Cephalosporin reaction was to not specified; however, has tolerated Amoxicillin, Cefazolin, and Cefepime       Past Medical History: Diagnosis Date   • Anemia    • Chemotherapy follow-up examination    • Depression    • Endometrial cancer ([facility] [address] ) 12/2017   • History of chemotherapy     started 12/2021endometrial cancer   • Hyperglycemia     vx type 2 dm -- last assessed 4/1/14; resolved 11/7/17   • Hyperlipidemia    • Hypertension    • Obesity     last assessed 10/14/17; resolved 11/7/17       Past Surgical History:   Procedure Laterality Date   • ABDOMINAL SURGERY      GASTRIC BYPASS   • BREAST BIOPSY Right 06/28/2019    core biopsy; benign   • CHOLECYSTECTOMY      at the time of gastric bypass   • COLONOSCOPY     • CT NEEDLE BIOPSY LYMPH NODE  07/08/2019   • FL GUIDED CENTRAL VENOUS ACCESS DEVICE INSERTION  11/12/2019   • GASTRIC BYPASS     • HYSTERECTOMY Bilateral 2017    total abdominal with salpingo-oophorectomy   • IR NEPHROSTOMY TO NEPHROURETERAL STENT  06/09/2022   • IR NEPHROSTOMY TO NEPHROURETERAL STENT  12/20/2022   • IR NEPHROSTOMY TUBE CHECK/CHANGE/REPOSITION/REINSERTION/UPSIZE  05/27/2022   • IR NEPHROSTOMY TUBE PLACEMENT  07/26/2019   • IR NEPHROURETERAL STENT CHECK/CHANGE/REPOSITION  04/06/2021   • IR NEPHROURETERAL STENT CHECK/CHANGE/REPOSITION  06/04/2021   • IR NEPHROURETERAL STENT CHECK/CHANGE/REPOSITION  09/03/2021   • IR NEPHROURETERAL STENT CHECK/CHANGE/REPOSITION  12/07/2021   • IR NEPHROURETERAL STENT CHECK/CHANGE/REPOSITION  03/08/2022   • IR NEPHROURETERAL STENT CHECK/CHANGE/REPOSITION  05/10/2022   • IR NEPHROURETERAL STENT CHECK/CHANGE/REPOSITION  9/19/2022   • IR PICC LINE  09/27/2019   • IR PORT PLACEMENT  07/26/2019   • IR PORT REMOVAL  09/20/2019   • OOPHORECTOMY Bilateral 2017   • NY INSJ TUNNELED CTR VAD W/SUBQ PORT AGE 5 YR/> Left 11/12/2019    Procedure: INSERTION VENOUS PORT ( PORT-A-CATH) IR;  Surgeon: Marcos Spann DO;  [facility];  Service: Interventional Radiology   • [place] LAPS ABD PRTM&OMENTUM DX W/WO SPEC BR/WA SPX N/A 12/19/2017    Procedure: LAPAROSCOPY DIAGNOSTIC;  Surgeon: Tomasa Warren Esther Tillman MD;  Location: [facility];  Service: Gynecology Oncology   • TN LAPS W/RAD HYST W/BILAT LMPHADEC RMVL TUBE/OVARY N/A 2017    Procedure: HYSTERECTOMY LAPAROSCOPIC TOTAL ([address]) W/ ROBOTICS; BILATERAL SALPINGOOPHERECTOMY; LYMPH NODE DISSECTION; lysis of adhesions;  Surgeon: Graham Morales MD;  Location: [facility];  Service: Gynecology Oncology   • TONSILLECTOMY     • US GUIDED BREAST BIOPSY RIGHT COMPLETE Right 2019       OB History             Para        Term   0            AB        Living           SAB        IAB        Ectopic        Multiple        Live Births                     Family History   Problem Relation Age of Onset   • Other Mother         dyslipidemia   • Ovarian cancer Mother 48   • Lymphoma Father    • Breast cancer Sister 61   • No Known Problems Maternal Grandmother    • Bone cancer Maternal Grandfather    • Uterine cancer Paternal Grandmother    • No Known Problems Paternal Grandfather    • No Known Problems Brother    • No Known Problems Brother    • No Known Problems Maternal Aunt    • No Known Problems Maternal Aunt    • No Known Problems Maternal Aunt    • No Known Problems Maternal Aunt    • No Known Problems Paternal Aunt    • No Known Problems Paternal Aunt    • No Known Problems Paternal Aunt    • No Known Problems Paternal Aunt        The following portions of the patient's history were reviewed and updated as appropriate: allergies, current medications, past family history, past medical history, past social history, past surgical history and problem list       Objective:    Blood pressure 98/60, pulse 98, temperature 98 7 °F (37 1 °C), temperature source Temporal, resp  rate 18, height 4' 11" (1 499 m), weight 54 2 kg (119 lb 8 oz), SpO2 100 %, not currently breastfeeding  Body mass index is 24 14 kg/m²  Physical Exam  Vitals reviewed  Constitutional:       General: She is not in acute distress  Appearance: Normal appearance   She is not ill-appearing  HENT:      Head: Normocephalic and atraumatic  Mouth/Throat:      Mouth: Mucous membranes are moist    Eyes:      General: No scleral icterus  Right eye: No discharge  Left eye: No discharge  Conjunctiva/sclera: Conjunctivae normal    Pulmonary:      Effort: Pulmonary effort is normal    Musculoskeletal:      Right lower leg: No edema  Left lower leg: No edema  Skin:     General: Skin is warm and dry  Coloration: Skin is not jaundiced  Findings: No rash  Neurological:      General: No focal deficit present  Mental Status: She is alert and oriented to person, place, and time  Cranial Nerves: No cranial nerve deficit  Sensory: No sensory deficit  Motor: No weakness  Gait: Gait normal    Psychiatric:         Mood and Affect: Mood normal          Behavior: Behavior normal          Thought Content:  Thought content normal          Judgment: Judgment normal            No results found for:   Lab Results   Component Value Date    WBC 7 72 12/02/2022    HGB 9 7 (L) 12/02/2022    HCT 31 1 (L) 12/02/2022    MCV 96 12/02/2022     12/02/2022     Lab Results   Component Value Date     10/27/2017    K 3 7 12/02/2022     12/02/2022    CO2 20 (L) 12/02/2022    BUN 22 12/02/2022    CREATININE 1 36 (H) 12/02/2022    GLUCOSE 219 (H) 12/19/2017    GLUF 173 (H) 10/21/2022    CALCIUM 9 0 12/02/2022    CORRECTEDCA 10 5 (H) 11/11/2022    AST 11 (L) 12/02/2022    ALT 8 12/02/2022    ALKPHOS 140 (H) 12/02/2022    PROT 6 9 10/27/2017    BILITOT 0 3 10/27/2017    EGFR 40 12/02/2022        Trend:  No results found for:

## 2022-12-30 NOTE — PROGRESS NOTES
Ragini Melendez (1955) is patient #[id number] on the [facility] clinical trial  Pt was seen for her end of treatment visit  Link Louie Dale recent CT scan was reviewed and unchanged from previous exam  Patient had discussions with her physician and decided that she wished to end treatment at this time  Nena Tavares discussed this with patient at her last visit on 12/2/2022  Patients last dose of  Rucaparib was on 12/23/2022  Patient states that she is feeling much better after stopping all treatment  Her fatigue and constipation are still present but resolving  Patients overall QOL is improving  AE and ConMeds reviewed  Patient will return cycle 35 pills and diary next week the week of 1/2/2023  Patient verbalized understanding of her new follow-up schedule for study  She will be seen in one month  Then 90 days  There after , with scans every 3 months   End of treatment labs were collected today  Patient will call if she has any questions or concerns

## 2022-12-30 NOTE — PATIENT INSTRUCTIONS
1  Return to the office in 1 month  2   Contact the office in the interim if you develop any any new or concerning symptoms, including vaginal bleeding, discharge, pain, etc

## 2022-12-30 NOTE — PROGRESS NOTES
Patient arrived for central line blood work  Offers no complaints  Blood work drawn with port access  Port flushed per protocol  Patient provided with specimen cup so she can drop off a urine sample at the outpatient lab  Patient was unable to provide a urine sample while here   Patient verified upcoming appointments and declined AVS

## 2022-12-30 NOTE — ASSESSMENT & PLAN NOTE
59-year-old with recurrent stage IB endometrial cancer who completed the EndoBARR trial on 12/5/22 after 35 cycles  She is feeling well and has no acute concerns  Her PCN was internalized (coverted to double J stent)  on 12/20  She has some urinary incontinence but no pain or other issues  PS is 1  Patient will return to the office for 1 month f/u as per clinical trial protocol  She will call in the interim with any concerns

## 2022-12-30 NOTE — PROGRESS NOTES
Daily Note     Today's date: 2022  Patient name: Veronica Vieyra  : 1955  MRN: [id number]  Referring provider: Erica Canchola DO  Dx:   Encounter Diagnosis     ICD-10-CM    1  Other closed nondisplaced fracture of proximal end of left humerus with routine healing, subsequent encounter  S42 295D           Start Time: 1430  Stop Time: 1515  Total time in clinic (min): 45 minutes    Subjective: Patient reports she is feeling better today  Objective: See treatment diary below      Assessment: Tolerated treatment well  Continues to have limitations due to ERP with PROM of shoulder flexion and ABD  ER improving in supine  Incorporated standing shoulder flexion with AAROM, with improving mobility, but continues to be limited to about 150 deg  Improving ability with shelf taps, but continues to have limited mobility with reaching 3rd shelf  Patient exhibited good technique with therapeutic exercises and would benefit from continued PT      Plan: Continue per plan of care  Progress treatment as tolerated         Diagnosis: L proximal humerus fracture (DOI 2022)  Precautions: HTN, DM, hx of chemo, recent nephro stent (2022)  ( * astericks indicates given per HEP)    Manuals 11/25 11/28 11/30 12/8 12/12 12/15 12/19 12/28 12/30   STM DK DK DK DK DK DK DK DK DK   IASTM            PROM DK DK DK DK DK DK DK DK DK               Neuro Re-Ed            Mid Rows 2x10 RTB 2x10 GTB 2x10 GTB 2x10 GTB 2x10 GTB       Shoulder Ext 2x10 RTB 2x10 GTB 2x10 GTB 2x10 GTB 2x10 GTB       Web Slide IR/ER x15ea RTB 2x10 RTB ea 2x10 GTB ea 2x10 GTB ea 2x10 GTBea  2x10 GTBea 2x10 GTBea 2x10 GTBea               Ther Ex            physioball rollouts seated            Table slides flex and ABD (standing)            AAROM ER with cane Seated 2x10 Seated 2x10 Seated 2x10 Seated 2x10 Seated 2x10  Seated 2x10 Seated 2x10 Seated 2x10   AAROM flexion with cane supine 2x10 supine 2x10 supine 2x10 supine 2x10 supine 2x10  supine 2x10 supine 2x10, stand x10 supine 2x10, stand x10   AAROM ABD with cane Standing 2x10 Standing 2x10 Standing 2x10 Standing 2x10 Standing 2x10  Standing 2x10 Standing 2x10 Standing 2x10   Wall slides with pillow case Flex x15  Scap x 10 Flex 2x10  Scap x10 Flex 2x10  Scap x15 NV NV  Flex 2x10  Scap x15 Flex 2x10  Scap x15 Flex 2x10  Scap 2x10   Pulleys Seated flex + scap 5s x 15 Seated flex + scap 5s x 15 Seated flex + scap 5s, 2x10 Seated flex + scap 5s, 2x10 Seated flex + scap 5s, 2x10  Seated flex + scap 5s, 2x10 Seated flex + scap 5s, 2x10 Seated flex + scap 5s, 2x10   IR stretch with strap  3" x15 standing 5" x15 standing NV NV  5" x15 standing                 Ther Activity            UBE         retro only x2'   Shoulder Shelf taps Flexion       #1 x10 #1 2x5 #1 x15   Pt Ed      HEP      Re-Evaluation      DK      Modalities            Heat/ice (prn)

## 2022-12-30 NOTE — ASSESSMENT & PLAN NOTE
Patient currently SABAS  Will continue Lovenox for at least another month, and consider discontinuing at her next follow-up visit in 4 weeks

## 2023-01-01 DIAGNOSIS — I82.4Y1 ACUTE DEEP VEIN THROMBOSIS (DVT) OF PROXIMAL VEIN OF RIGHT LOWER EXTREMITY (HCC): ICD-10-CM

## 2023-01-03 ENCOUNTER — TELEPHONE (OUTPATIENT)
Dept: NEPHROLOGY | Facility: CLINIC | Age: 68
End: 2023-01-03

## 2023-01-03 ENCOUNTER — OFFICE VISIT (OUTPATIENT)
Dept: PHYSICAL THERAPY | Facility: CLINIC | Age: 68
End: 2023-01-03

## 2023-01-03 DIAGNOSIS — S42.295D OTHER CLOSED NONDISPLACED FRACTURE OF PROXIMAL END OF LEFT HUMERUS WITH ROUTINE HEALING, SUBSEQUENT ENCOUNTER: Primary | ICD-10-CM

## 2023-01-03 DIAGNOSIS — R80.9 PROTEINURIA, UNSPECIFIED TYPE: Primary | ICD-10-CM

## 2023-01-03 RX ORDER — ENOXAPARIN SODIUM 100 MG/ML
INJECTION SUBCUTANEOUS
Qty: 72 ML | Refills: 1 | Status: SHIPPED | OUTPATIENT
Start: 2023-01-03

## 2023-01-03 NOTE — PROGRESS NOTES
Daily Note     Today's date: 1/3/2023  Patient name: Miroslava Torres  : 1955  MRN: [id number]  Referring provider: Urbano Blankenship DO  Dx:   Encounter Diagnosis     ICD-10-CM    1  Other closed nondisplaced fracture of proximal end of left humerus with routine healing, subsequent encounter  S42 295D           Start Time: 1530  Stop Time: 1615  Total time in clinic (min): 45 minutes    Subjective: Patient reports feeling better overall  She reports slightly improved mobility and tolerance with overhead reaching  Objective: See treatment diary below      Assessment: Tolerated treatment well  Continues to have limitations in shoulder flexion PROM and AROM  Patient noted to have bony and painful end-feels  Attempted overpressure and GHJ stabilization with standing shoulder flexion AAROM with slightly improved mobility noted  Performed UBE in FWD direction today with improved tolerance and good mobility  Improved mechanics and tolerance with shoulder taps overhead reaching with weight  Patient exhibited good technique with therapeutic exercises and would benefit from continued PT      Plan: Continue per plan of care  Progress treatment as tolerated         Diagnosis: L proximal humerus fracture (DOI 2022)  Precautions: HTN, DM, hx of chemo, recent nephro stent (2022)  ( * astericks indicates given per HEP)    Manuals 12/15 12/19 12/28 12/30 1/3/23   STM DK DK DK DK DK   IASTM        PROM DK DK DK DK DK           Neuro Re-Ed        Mid Rows        Shoulder Ext        Web Slide IR/ER  2x10 GTBea 2x10 GTBea 2x10 GTBea 2x10 GTBea           Ther Ex        physioball rollouts seated        Table slides flex and ABD (standing)        AAROM ER with cane  Seated 2x10 Seated 2x10 Seated 2x10 Seated 2x10   AAROM flexion with cane  supine 2x10 supine 2x10, stand x10 supine 2x10, stand x10 supine 2x10, stand x10   AAROM flexion cane with overpressure     Standing x10   AAROM ABD with cane  Standing 2x10 Standing 2x10 Standing 2x10 Standing 2x10   Wall slides with pillow case  Flex 2x10  Scap x15 Flex 2x10  Scap x15 Flex 2x10  Scap 2x10 Flex 2x10  Scap 2x10   Pulleys  Seated flex + scap 5s, 2x10 Seated flex + scap 5s, 2x10 Seated flex + scap 5s, 2x10 Seated flex + scap 5s, 2x10   IR stretch with strap  5" x15 standing              Ther Activity        UBE    retro only x2' FWD only x2'   Shoulder Shelf taps Flexion  #1 x10 #1 2x5 #1 x15 #1 x15   Pt Ed HEP       Re-Evaluation DK       Modalities        Heat/ice (prn)

## 2023-01-05 ENCOUNTER — OFFICE VISIT (OUTPATIENT)
Dept: PHYSICAL THERAPY | Facility: CLINIC | Age: 68
End: 2023-01-05

## 2023-01-05 DIAGNOSIS — S42.295D OTHER CLOSED NONDISPLACED FRACTURE OF PROXIMAL END OF LEFT HUMERUS WITH ROUTINE HEALING, SUBSEQUENT ENCOUNTER: Primary | ICD-10-CM

## 2023-01-05 NOTE — PROGRESS NOTES
Daily Note     Today's date: 2023  Patient name: Jacob Soni  : 1955  MRN: [id number]  Referring provider: Justyn Spears DO  Dx:   Encounter Diagnosis     ICD-10-CM    1  Other closed nondisplaced fracture of proximal end of left humerus with routine healing, subsequent encounter  S42 295D           Start Time: 1215  Stop Time: 1300  Total time in clinic (min): 45 minutes    Subjective: Patient reports her shoulder is feeling better and can do more with her arm  Objective: See treatment diary below      Assessment: Tolerated treatment well  PROM of Left shoulder with improving into flexion  Overpressure and GHJ glide with AAROM in ER, ABD, and flexion with improved mobility and tolerance to AAROM  Patient reported muscle soreness with shoulder taps with weight, but completed remainder of exercises with good tolerance and effort  UBE performed end of session with good tolerance in FWD direction, steadily improving BWD tolerance  Patient exhibited good technique with therapeutic exercises and would benefit from continued PT      Plan: Continue per plan of care  Progress treatment as tolerated         Diagnosis: L proximal humerus fracture (DOI 2022)  Precautions: HTN, DM, hx of chemo, recent nephro stent (2022)  ( * astericks indicates given per HEP)    Manuals 12/15 12/19 12/28 12/30 1/3/23 1/5/23   STM DK DK DK DK DK DK   IASTM         PROM DK DK DK DK DK DK            Neuro Re-Ed         Mid Rows         Shoulder Ext         Web Slide IR/ER  2x10 GTBea 2x10 GTBea 2x10 GTBea 2x10 GTBea 2x10 GTBea            Ther Ex         physioball rollouts seated         Table slides flex and ABD (standing)         AAROM ER with cane  Seated 2x10 Seated 2x10 Seated 2x10 Seated 2x10 Seated 2x10   AAROM flexion with cane  supine 2x10 supine 2x10, stand x10 supine 2x10, stand x10 supine 2x10, stand x10 supine 2x10, stand x10   AAROM flexion cane with overpressure     Standing x10 Standing x10   AAROM ABD with cane  Standing 2x10 Standing 2x10 Standing 2x10 Standing 2x10 Standing 2x10   Wall slides with pillow case  Flex 2x10  Scap x15 Flex 2x10  Scap x15 Flex 2x10  Scap 2x10 Flex 2x10  Scap 2x10 Flex 2x10  Scap 2x10   Pulleys  Seated flex + scap 5s, 2x10 Seated flex + scap 5s, 2x10 Seated flex + scap 5s, 2x10 Seated flex + scap 5s, 2x10 Seated flex + scap 5s, 2x10   IR stretch with strap  5" x15 standing                Ther Activity         UBE    retro only x2' FWD only x2' FWD x2', BWD x1'   Shoulder Shelf taps Flexion  #1 x10 #1 2x5 #1 x15 #1 x15 #1 x15   Pt Ed HEP        Re-Evaluation DK        Modalities         Heat/ice (prn)

## 2023-01-06 ENCOUNTER — OFFICE VISIT (OUTPATIENT)
Dept: UROLOGY | Facility: AMBULATORY SURGERY CENTER | Age: 68
End: 2023-01-06

## 2023-01-06 ENCOUNTER — APPOINTMENT (OUTPATIENT)
Dept: LAB | Facility: CLINIC | Age: 68
End: 2023-01-06

## 2023-01-06 VITALS
WEIGHT: 119 LBS | OXYGEN SATURATION: 98 % | HEIGHT: 59 IN | BODY MASS INDEX: 23.99 KG/M2 | HEART RATE: 73 BPM | DIASTOLIC BLOOD PRESSURE: 62 MMHG | SYSTOLIC BLOOD PRESSURE: 98 MMHG

## 2023-01-06 DIAGNOSIS — N13.30 HYDRONEPHROSIS OF RIGHT KIDNEY: ICD-10-CM

## 2023-01-06 DIAGNOSIS — R32 INCONTINENCE IN FEMALE: Primary | ICD-10-CM

## 2023-01-06 LAB
CREAT UR-MCNC: <13 MG/DL
MICROALBUMIN UR-MCNC: 234 MG/L (ref 0–20)
MICROALBUMIN/CREAT 24H UR: >1800 MG/G CREATININE (ref 0–30)

## 2023-01-06 RX ORDER — CIPROFLOXACIN 2 MG/ML
400 INJECTION, SOLUTION INTRAVENOUS ONCE
OUTPATIENT
Start: 2023-01-06 | End: 2023-01-06

## 2023-01-06 NOTE — PROGRESS NOTES
01/06/23    Ragini Al   1955   [id number]     Assessment  1 History of right hydronephrosis secondary to endometrial carcinoma, status post right nephrostomy tube insertion  2 Urinary urge incontinence     Discussion/Plan  1 History of right hydronephrosis secondary to endometrial carcinoma, status post right nephrostomy tube insertion   OR case request for cysto, retrograde pyelography, and routine exchange of right ureteral stent due early March 2023 with Dr Kole Long    Pre-op urine culture ordered   2 Urinary urge incontinence     Referral to pelvic floor PT - brochure provided    Continue Gemtasa and oxybuytnin daily     Patient will schedule OR for March 2023 with Dr Kole Long  Subjective  HPI   Vikram Escoto is a 74 y o  female with a history of recurrent stage I B endometrial carcinoma  She follows with gynecological oncology  She is receiving immunotherapy  She was seen in Interventional Radiology on September 19, 2022 and underwent exchange of her nephroureteral stent which was then capped  She returned 12/20/22 for routine tube exchange  Right nephroureteral stent was internalized to a double-J right stent  The site has healed  She states it is occasionally itchy  Denies redness or drainage from wound  She is very pleased that she does not have a bag external to drain urine  She returns to discuss next steps in care  She reports worsening urgency incontinence despite Gemtasta and oxybutynin  Myrbetriq was more effective for her however she experienced undesirable side effects       Review of Systems - History obtained from chart review and the patient  General ROS: negative  Psychological ROS: negative  Respiratory ROS: negative  Cardiovascular ROS: negative  Gastrointestinal ROS: negative  Genito-Urinary ROS: positive for - incontinence and urinary urgency  Musculoskeletal ROS: negative  Neurological ROS: negative  Dermatological ROS: negative       Objective  Physical Exam  Vitals and nursing note reviewed  Constitutional:       General: She is not in acute distress  Appearance: Normal appearance  She is normal weight  She is not ill-appearing, toxic-appearing or diaphoretic  HENT:      Head: Normocephalic and atraumatic  Pulmonary:      Effort: Pulmonary effort is normal  No respiratory distress  Abdominal:      Tenderness: There is no right CVA tenderness or left CVA tenderness  Musculoskeletal:         General: Normal range of motion  Cervical back: Normal range of motion  Skin:     General: Skin is warm and dry  Neurological:      General: No focal deficit present  Mental Status: She is alert and oriented to person, place, and time  Mental status is at baseline  Psychiatric:         Mood and Affect: Mood normal          Behavior: Behavior normal          Thought Content: Thought content normal          Judgment: Judgment normal               RAFITA Hallman     I have spent 15 minutes with Patient and family today in which greater than 50% of this time was spent in counseling/coordination of care regarding Intructions for management

## 2023-01-06 NOTE — PROGRESS NOTES
Outpatient Oncology Nutrition Consult  Type of Consult: Follow Up  Care Location: Office Visit   Nutrition Assessment:  Oncology Diagnosis & Treatments:   Endometrial cancer  S/p surgery 12/19/17  Recurrence 7/8/19  S/p chemotherapy (carboplatin/taxol from 7/30/19-1/6/20)  S/p whole pelvis RT (2/4/20- 4/13/20)  Disease progression  S/p ENDOBAR trial 12/21/20-12/5/22    Oncology History   Endometrial cancer ([facility] [address] )   11/17/2017 Initial Diagnosis    Endometrial cancer ([facility] [address] )     11/17/2017 Biopsy    ENDOMETRIAL BIOPSY: WELL DIFFERENTIATED ENDOMETRIAL ADENOCARCINOMA (FIGO I) WITH FOCALMUCINOUS FEATURES  Part B: ENDOCERVICAL POLYP:BENIGN ENDOCERVICAL      12/19/2017 Surgery    Robotic assisted total laparoscopic hysterectomy with bilateral salpingo-oophorectomy and sentinel bilateral pelvic lymph node dissection  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 (4 4 x 3 2 cm tumor, 9 4/15 4mm invasion, NO LVSI, washings revealed atypical cellular changes)     12/19/2017 Genetic Testing    Morrison testing negative     6/28/2019 Biopsy    A  Breast, Right, US BX Right Breast 1000 4 cmfn:  - Benign breast tissue with focal histiocytic aggregate  See comment   - Negative for atypia and in-situ or invasive carcinoma  7/8/2019 Recurrence    Presented with right lower extremity DVT and CT demonstrating right pelvic sidewall mass with venous, ureteral and nerve compression causing significant neuropathic pain  Biopsy:  Lymph Node, Right pelvic lymph node x3:  - High-grade adenocarcinoma  7/30/2019 - 1/6/2020 Chemotherapy    Taxol 175 mg/m2 and carboplatin AUC 6 every 21 days  Dose was reduced to taxol 135 mg/m2 and carboplatin AUC 5  Completed 6 cycles  Treatment protracted due to multiple hospital admissions       2/24/2020 - 4/13/2020 Radiation    adjuvant external beam radiation therapy to the whole pelvis to 4500 cGy followed by boost to gross disease of an additional 2200 cGy     11/23/2020 Progression    New necrotic adenopathy in the retroperitoneum on CT      12/21/2020 -  Chemotherapy    Began chemotherapy with rucaparib, atezolizumab, and bevacizumab as per [facility] clinical trial     Rucaparib held cycle 28 secondary to fatigue  Avastin held since cycle 30 for elevated UPC  Treatment held for one cycle after cycle 31 for mucositis/fatigue         PMH: RNYGB (2001 at [address])    Past Medical History:   Diagnosis Date   • Anemia    • Chemotherapy follow-up examination    • Depression    • Endometrial cancer ([facility] [address] ) 12/2017   • History of chemotherapy     started 12/2021endometrial cancer   • Hyperglycemia     vx type 2 dm -- last assessed 4/1/14; resolved 11/7/17   • Hyperlipidemia    • Hypertension    • Obesity     last assessed 10/14/17; resolved 11/7/17     Past Surgical History:   Procedure Laterality Date   • ABDOMINAL SURGERY      GASTRIC BYPASS   • BREAST BIOPSY Right 06/28/2019    core biopsy; benign   • CHOLECYSTECTOMY      at the time of gastric bypass   • COLONOSCOPY     • CT NEEDLE BIOPSY LYMPH NODE  07/08/2019   • FL GUIDED CENTRAL VENOUS ACCESS DEVICE INSERTION  11/12/2019   • GASTRIC BYPASS     • HYSTERECTOMY Bilateral 2017    total abdominal with salpingo-oophorectomy   • IR NEPHROSTOMY TO NEPHROURETERAL STENT  06/09/2022   • IR NEPHROSTOMY TO NEPHROURETERAL STENT  12/20/2022   • IR NEPHROSTOMY TUBE CHECK/CHANGE/REPOSITION/REINSERTION/UPSIZE  05/27/2022   • IR NEPHROSTOMY TUBE PLACEMENT  07/26/2019   • IR NEPHROURETERAL STENT CHECK/CHANGE/REPOSITION  04/06/2021   • IR NEPHROURETERAL STENT CHECK/CHANGE/REPOSITION  06/04/2021   • IR NEPHROURETERAL STENT CHECK/CHANGE/REPOSITION  09/03/2021   • IR NEPHROURETERAL STENT CHECK/CHANGE/REPOSITION  12/07/2021   • IR NEPHROURETERAL STENT CHECK/CHANGE/REPOSITION  03/08/2022   • IR NEPHROURETERAL STENT CHECK/CHANGE/REPOSITION  05/10/2022   • IR NEPHROURETERAL STENT CHECK/CHANGE/REPOSITION  9/19/2022   • IR PICC LINE  09/27/2019   • IR PORT PLACEMENT  07/26/2019   • IR PORT REMOVAL  09/20/2019   • OOPHORECTOMY Bilateral 2017   • OK INSJ TUNNELED CTR VAD W/SUBQ PORT AGE 5 YR/> Left 11/12/2019    Procedure: INSERTION VENOUS PORT ( PORT-A-CATH) IR;  Surgeon: Pavan Burks DO;  [facility];  Service: Interventional Radiology   • OK LAPS ABD PRTM&OMENTUM DX W/WO SPEC BR/WA SPX N/A 12/19/2017    Procedure: LAPAROSCOPY DIAGNOSTIC;  Surgeon: Audrey Rosas MD;  Location: [facility];  Service: Gynecology Oncology   • OK LAPS W/RAD HYST W/BILAT LMPHADEC RMVL TUBE/OVARY N/A 12/19/2017    Procedure: HYSTERECTOMY LAPAROSCOPIC TOTAL ([address]) W/ ROBOTICS; BILATERAL SALPINGOOPHERECTOMY; LYMPH NODE DISSECTION; lysis of adhesions;  Surgeon: Audrey Rosas MD;  Location: [facility];  Service: Gynecology Oncology   • TONSILLECTOMY     • US GUIDED BREAST BIOPSY RIGHT COMPLETE Right 06/28/2019       Review of Medications:   Vitamins, Supplements and Herbals: yes: Hx RNYGB regimen: Vitamin D, Folic Acid, Align, calcium citrate TID, B12 1000 mcg QD, MVI BID (MVI does not have iron);  Iron 65 mg QD (2 hrs seperate from calcium)    Current Outpatient Medications:   •  amoxicillin (AMOXIL) 500 mg capsule, , Disp: , Rfl:   •  ARIPiprazole (ABILIFY) 20 MG tablet, Take 20 mg by mouth in the morning , Disp: , Rfl:   •  aspirin (ECOTRIN LOW STRENGTH) 81 mg EC tablet, Take 81 mg by mouth daily, Disp: , Rfl:   •  BD PosiFlush 0 9 % SOLN, , Disp: , Rfl:   •  Calcium-Magnesium-Vitamin D (CALCIUM 500 PO), Take by mouth daily , Disp: , Rfl:   •  cholecalciferol (VITAMIN D3) 1,000 units tablet, Take 1,000 Units by mouth daily, Disp: , Rfl:   •  clotrimazole-betamethasone (LOTRISONE) 1-0 05 % cream, Apply topically 2 (two) times a day (Patient not taking: Reported on 1/13/2023), Disp: 30 g, Rfl: 0  •  Cranberry-Vitamin C 250-60 MG CAPS, Take 1 tablet by mouth in the morning, Disp: 90 capsule, Rfl: 3  •  Cyanocobalamin (VITAMIN B12 PO), Take by mouth daily , Disp: , Rfl:   • dronabinol (MARINOL) 2 5 mg capsule, Take 1 capsule (2 5 mg total) by mouth 2 (two) times a day before meals, Disp: 30 capsule, Rfl: 0  •  enoxaparin (LOVENOX) 80 mg/0 8 mL, INJECT 0 8ML (80MG TOTAL)  SUBCUTANEOUSLY EVERY 24  HOURS, Disp: 72 mL, Rfl: 1  •  ergocalciferol (VITAMIN D2) 50,000 units, Take 1 capsule (50,000 Units total) by mouth once a week for 6 doses (Patient not taking: Reported on 11/29/2022), Disp: 6 capsule, Rfl: 0  •  estradiol (ESTRACE VAGINAL) 0 1 mg/g vaginal cream, Insert 1g nightly for 6 weeks, then 1-2x weekly  , Disp: 42 5 g, Rfl: 1  •  folic acid (FOLVITE) 1 mg tablet, Take 1 tablet (1 mg total) by mouth daily (Patient not taking: Reported on 1/13/2023), Disp: , Rfl: 0  •  gemfibrozil (LOPID) 600 mg tablet, TAKE 1 TABLET BY MOUTH EVERY DAY, Disp: 90 tablet, Rfl: 0  •  Gemtesa 75 MG TABS, TAKE 75 MG BY MOUTH IN THE MORNING, Disp: 90 tablet, Rfl: 2  •  lisinopril (ZESTRIL) 5 mg tablet, Take 1 tablet (5 mg total) by mouth daily, Disp: 90 tablet, Rfl: 3  •  LORazepam (ATIVAN) 0 5 mg tablet, Take 1 tablet (0 5 mg total) by mouth every 6 (six) hours as needed for anxiety (or nausea), Disp: 36 tablet, Rfl: 1  •  methylprednisolone (MEDROL) 4 mg tablet, Take 1 tablet (4 mg total) by mouth in the morning Take 2 tables once a day for 5 days, take 1 tablet once a day for 5 days, take 1 tablet every other day for 14 days  , Disp: 21 tablet, Rfl: 0  •  Multiple Vitamin (MULTIVITAMIN) tablet, Take 1 tablet by mouth daily, Disp: , Rfl:   •  naloxone (NARCAN) 4 mg/0 1 mL nasal spray, Administer 1 spray into a nostril   If no response after 2-3 minutes, give another dose in the other nostril using a new spray , Disp: 1 each, Rfl: 0  •  omeprazole (PriLOSEC) 20 mg delayed release capsule, Take 20 mg by mouth daily, Disp: , Rfl:   •  ondansetron (ZOFRAN) 8 mg tablet, Take 1 tablet (8 mg total) by mouth every 8 (eight) hours as needed for nausea or vomiting, Disp: 30 tablet, Rfl: 1  •  oxybutynin (DITROPAN XL) 15 MG 24 hr tablet, Take 1 tablet (15 mg total) by mouth daily at bedtime, Disp: 90 tablet, Rfl: 3  •  rucaparib (RUBRACA) 300 mg tablet, Take 600 mg by mouth every 12 (twelve) hours Take with or without food  Do not repeat a vomited dose   (Patient not taking: Reported on 1/6/2023), Disp: , Rfl:   •  saccharomyces boulardii (FLORASTOR) 250 mg capsule, Take 1 capsule (250 mg total) by mouth 2 (two) times a day, Disp: , Rfl: 0  •  senna (SENOKOT) 8 6 MG tablet, Take 1 tablet (8 6 mg total) by mouth 2 (two) times a day as needed for constipation, Disp: 60 tablet, Rfl: 0  •  sodium chloride, PF, 0 9 %, 10 mL by Intracatheter route daily (Patient not taking: Reported on 1/13/2023), Disp: 300 mL, Rfl: 3  •  venlafaxine (EFFEXOR) 75 mg tablet, Take 75 mg by mouth 3 (three) times a day  , Disp: , Rfl:     Most Recent Lab Results:  Lab Results   Component Value Date    WBC 6 30 12/30/2022    IRON 92 11/19/2021    TIBC 374 11/19/2021    FERRITIN 1,429 (H) 11/19/2021    CHOLESTEROL 197 12/30/2022    CHOL 183 10/27/2017    TRIG 87 04/15/2022    HDL 72 04/15/2022    LDLCALC 100 04/15/2022    ALT 9 12/30/2022    AST 10 (L) 12/30/2022    ALB 3 7 12/30/2022     10/27/2017     05/12/2017    SODIUM 138 12/30/2022    SODIUM 136 12/02/2022    K 4 2 12/30/2022    K 3 7 12/02/2022     12/30/2022    BUN 25 12/30/2022    BUN 22 12/02/2022    CREATININE 1 13 12/30/2022    CREATININE 1 36 (H) 12/02/2022    EGFR 51 12/30/2022    PHOS 4 2 (H) 12/30/2022    PHOS 3 3 12/02/2022    GLUCOSE 219 (H) 12/19/2017    POCGLU 97 08/18/2020    GLUF 86 12/30/2022    GLUF 173 (H) 10/21/2022    GLUC 86 12/30/2022    HGBA1C 5 2 10/31/2022    HGBA1C 4 8 07/07/2021    HGBA1C 5 1 02/03/2020    CALCIUM 9 2 12/30/2022    FOLATE 5 4 10/06/2019    MG 1 7 (L) 12/30/2022     Anthropometric Measurements:   Height: 59"  Ht Readings from Last 1 Encounters:   01/13/23 4' 10 5" (1 486 m)     Wt Readings from Last 20 Encounters:   01/16/23 55 3 kg (122 lb)   01/13/23 54 7 kg (120 lb 8 oz)   01/06/23 54 kg (119 lb)   12/30/22 54 2 kg (119 lb 8 oz)   12/20/22 52 2 kg (115 lb)   12/16/22 52 2 kg (115 lb)   12/13/22 52 2 kg (115 lb)   12/05/22 53 3 kg (117 lb 8 oz)   12/02/22 54 2 kg (119 lb 8 oz)   11/29/22 54 1 kg (119 lb 3 2 oz)   11/14/22 55 kg (121 lb 4 1 oz)   11/11/22 54 9 kg (121 lb)   10/27/22 56 2 kg (123 lb 12 8 oz)   10/25/22 56 4 kg (124 lb 5 4 oz)   10/21/22 56 5 kg (124 lb 8 oz)   10/11/22 56 6 kg (124 lb 12 8 oz)   10/03/22 55 3 kg (122 lb)   09/19/22 57 6 kg (127 lb)   09/09/22 57 4 kg (126 lb 8 7 oz)   09/09/22 56 7 kg (125 lb)     Weight Hx:  Usual Weight: 270# before RNYGB in 2001; lowest post-op wt: 200#; wt typically fluctuates between 215-230# (prior to wt loss)  Varian: (2/25/20) 185 6#, (3/3/20) 188 6#, (3/10/20) 187 2#, (3/19/20) 186 2#, (3/24/20) 187#, (3/31/20) 185 4#, (4/10/20) 184 4#    Home Scale Wts: (2/19/20) 185#, (8/3/20) 194#    Oncology Nutrition-Anthropometrics    Flowsheet Row Nutrition from 1/16/2023 in [facility] Nutrition from 12/5/2022 in [facility] Oncology Dietitian Services   Patient [age] (years): 74 years 74 years   Patient (female) height (in): 61 in 61 in   Current Weight to be used for anthropometric calculations (lbs) 122 lbs 117 5 lbs   Current Weight to be used for anthropometric calculations (kg) 55 5 kg 53 4 kg   BMI: 24 6 23 7   IBW female: 95 lbs 95 lbs   IBW (kg) female: 43 2 kg 43 2 kg   IBW % (female) 128 4 % 123 7 %   Adjusted BW (female): 101 8 lbs 100 6 lbs   Adjusted BW kg (female): 46 3 kg 45 7 kg   % weight change after 1 week: 2 5 % -1 4 %   Weight change after 1 week (lbs) 3 lbs -1 7 lbs   % weight change after 1 month: 6 1 % -2 9 %   Weight change after 1 month (lbs) 7 lbs -3 5 lbs   % weight change after 3 months: -2 2 % -7 5 %   Weight change after 3 months (lbs) -2 8 lbs -9 5 lbs   % weight change after 1 year: -9 6 % -- Nutrition-Focused Physical Findings:  moderate muscle depletion ([place]) - hollowing/scooping/depression and moderate body fat depletion ([place]) - hollowing/depression/dark circles     Food/Nutrition-Related History & Client/Social History:    Current Nutrition Impact Symptoms:   [] Nausea  [x] Reduced Appetite  [] Acid Reflux   [] Vomiting  [] Unintended Wt Loss - hx  -pt does not want to regain the wt that she lost [x] Malabsorption - S/p RNYGB in 2001   [] Diarrhea  [] Unintended Wt Gain  [] Dumping Syndrome   [x] Constipation - plans to restart apple/prune sauce recipe again [] Thick Mucous/Secretions  [] Abdominal Pain    [x] Dysgeusia (Altered Taste) - bland taste recently  [x] Xerostomia (Dry Mouth) - mainly at night  -uses lozenges [] Gas    [] Dysosmia (Altered Smell)  [] Thrush  [] Difficulty Chewing    [x] Oral Mucositis (Sore Mouth) - mouth sores started again yesterday  -using Biotene and bs/sw rinses  -reviewed diet changes [] Fatigue  [] Pain:   [] Odynophagia   [] Esophagitis  [] Other:    [] Dysphagia [] Early Satiety  [] No Problems Eating      Food Allergies & Intolerances: yes: had skin patch testing which showed allergy to strawberries, but says she is able to eat strawberries without any side effects    Current Diet: Regular Diet, No Restrictions  Current Nutrition Intake: Increased since last visit   Appetite: Fair    Nutrition Route: PO   Oral Care: Biotene toothpaste and rinse, bs/sw rinses  Activity level: Sedentary  Limited by pain and medical condition      25 Hr Diet Recall: has been cooking meals more often  Breakfast: nothing yet today, having a problem eating breakfast, has been having a beef and cheese stick (yesterday)  Snack: spinach and feta cheese omelet from [employer] (ate 1/4 of omelet)   Lunch: cannot remember what she had yesterday  Snack: Tostitos chips with salsa and cheese  Dinner: 1 cup of chicken and white rice (added butter)  Snack: pretzels with mustard    Beverages: water (16 9 oz x2-3), sweetened iced tea (32 oz x1)   -Does not drink coffee or alcohol   -Does not separate fluids from solids, does not have discomfort while eating and drinking at the same time  Supplements:   Premier Protein Shake (11 oz, 160 kcal, 30 g pro) - none lately   Unjury protein powder - occasionally adds to foods such as soup    Oncology Nutrition-Estimated Needs    Flowsheet Row Nutrition from 3/28/2022 in [place] [address] [facility] Nutrition from 10/11/2021 in [place] [address] On[facility] Dietitian Services   Weight type used Actual weight Adjusted weight   Weight in kilograms (kg) used for estimated needs 54 1 kg 49 5 kg   Energy needs formula:  35-40 kcal/kg 35-40 kcal/kg   Energy needs based on 35 kcal/k kcal 1733 kcal   Energy needs based on 40 kcal/k kcal 1980 kcal   Protein needs formula: 1 5-2 g/kg 1 5-2 g/kg   Protein needs based on 1 5 g/kg 81 g 74 g   Protein needs based on 2 g/kg 108 g 99 g   Fluid needs formula: 30-35 mL/kg 30-35 mL/kg   Fluid needs based on 30 mL/kg 1623 mL 1485 mL   Fluid needs in ounces 55 oz 50 oz   Fluid needs based on 35 mL/kg 1894 mL 1733 mL   Fluid needs in ounces 64 oz 59 oz        Discussion & Intervention:    Ragini was evaluated today for an RD follow up regarding nutrition impact sx management, weight management and cancer-preventative eating  Ragini has completed tx for endometrial cancer  Today she reports recurrent mouth sores and constipation  She wants to start the apple prune sauce again to help her constipation  We talked about a soft/moist diet and avoidance of foods that can worsen mouth sores (sharp/hard/crunchy foods, acidic/spicy foods, etc )  She would like to focus on changing her diet/eating pattern to prevent wt regain and future health complications       Reviewed 24 hour recall, which revealed an suboptimal po intake, and discussed ways to transition to a cancer preventative diet, balance energy intake to promote a healthy body weight and optimize nutrient intake to promote healing  Also reviewed the importance of wt management throughout the healing process and the role of a high kcal/ high protein diet in managing wt and overall health  Based on today's assessment, discussion included: MNT for: mouth sores, wt management, xerostomia, constipation, practicing proper oral care, choosing a variety of colorful, plant-based foods to support a cancer preventative diet, a soft/moist diet & food choices to include at all meals & snacks, adequate hydration & fluid choices, eating smaller more frequent meals every 2-3 hours (5-6 small meals/day), having consistent and planned snacks between meals, utilizing oral nutrition supplements, recipe suggestions/resources, trying new recipes, & cooking at home more often and balancing energy intake with physical activity  Moving forward, Ragini was encouraged to transition to a cancer-preventative lifestyle and practice MNT for nutrition impact sx management  She does best with self-directed goals and has chosen goals for herself moving forward; RD guidance provided during goal setting  Materials Provided: [employer] Eating Hints book  All questions and concerns addressed during today’s visit  Ragini has RD contact information  Nutrition Diagnosis:  Increased Nutrient Needs (kcal & pro) related to increased demand for nutrients and disease state as evidenced by recovering from cancer tx  Altered GI Function related to alteration in GI tract integrity as evidenced by Constipation, Oral Mucositis (Sore Mouth) and Xerostomia (Dry Mouth)        Monitoring & Evaluation:   Goals:  weight maintenance/stabilization  adequate nutrition impact symptom management  transition to a cancer preventative eating pattern     Progress Towards Goals: Progressing    Nutrition Rx & Recommendations:  Diet: Soft/Moist, High Calorie, High Protein Diet  Small, frequent meals/snacks may be easier to tolerate than 3 large daily meals  Aim for 5-6 small meals per day (every 2-3 hours)  Include protein at all meals/snacks  Follow a Cancer Preventative Nutrition Pattern: colorful, plant-based, low-fat, avoid added sugars, limit alcohol, include high fiber foods, limit processed meats, limit red meat, choose lean protein sources, use low-fat cooking methods, balance calories with physical activity, avoid excessive weight gain throughout life  Avoid spicy, acidic, sharp/hard/crunchy foods & carbonated beverages as needed  Follow proper oral care; Try baking soda/salt water rinse recipe (mix 3/4 tsp salt + 1 tsp baking soda + 1 qt water; rinse with plain water after using) in Eating Hints book (pg 18)  Brush your teeth before/after meals & before bed  For constipation: drink plenty of fluids (>64 oz/day); drink hot liquids; eat high-fiber foods as tolerated (whole grains, beans, peas, nuts, seeds, fruits, vegetables, etc); increase physical activity as tolerated  Avoid increasing fiber intake too quickly, add fiber into your diet slowly; keep a record of your bowel movements (see pages 13-14 in you Eating Hints book)  For sore mouth/throat: choose foods that are easy to chew/swallow; cook foods until they are soft/tender; moisten & soften foods (gravy/sauce/broth/yogurt); cut food into small pieces; drink with a straw (as tolerated); eat with a very small spoon; eat cold or room temperature food; suck on ice chips; Avoid citrus, spicy foods, tomatoes/ketchup, salty foods, raw vegetables, sharp/crunchy/hard foods & alcohol (see pages 23-28 in your Eating Hints book)    For dry mouth: sip water throughout the day; try very sweet (or tart, as tolerated) drinks; chew gum or suck on hard candy, popsicles, & ice chips; eat easy to swallow foods (such as pureed cooked foods or soups); moisten food with sauce/gravy/salad dressing; do not drink beer, wine, or any type of alcohol; keep lips moist with lip balm; avoid tobacco products & second-hand smoke; try Biotene as needed (see pages 17-18 in your Eating Hints book)  Follow proper oral care; Try baking soda/salt water rinse recipe (mix 3/4 tsp salt + 1 tsp baking soda + 1 qt water; rinse with pain water after using) in Eating Hints book (pg 18)  Brush your teeth before/after meals & before bed  Weigh yourself regularly  If you notice weight loss, make an effort to increase your daily food/calorie intake  If you continue to notice loss after these efforts, reach out to your dietitian to establish a plan to stabilize weight     Try dry mouth spray and using a humidifier at bedside to help manage your dry mouth at night  Work on pre-planning meals/snacks and keep a food journal if you find it to be helpful  Resume apple/prune sauce recipe for constipation    Patient choosen goals:  Aim for 5-6 small meals per day (every 2-3 hrs)  Choose a lean protein + fiber source at all eating occasions  Try incorporating a Premier shake once a day and as needed    Follow Up Plan: 2/6 at 11am   Recommend Referral to Other Providers: none at this time

## 2023-01-10 ENCOUNTER — OFFICE VISIT (OUTPATIENT)
Dept: PHYSICAL THERAPY | Facility: CLINIC | Age: 68
End: 2023-01-10

## 2023-01-10 DIAGNOSIS — S42.295D OTHER CLOSED NONDISPLACED FRACTURE OF PROXIMAL END OF LEFT HUMERUS WITH ROUTINE HEALING, SUBSEQUENT ENCOUNTER: Primary | ICD-10-CM

## 2023-01-10 NOTE — PROGRESS NOTES
Daily Note     Today's date: 1/10/2023  Patient name: Nathanael Lyons  : 1955  MRN: [id number]  Referring provider: Teodoro Cook DO  Dx:   Encounter Diagnosis     ICD-10-CM    1  Other closed nondisplaced fracture of proximal end of left humerus with routine healing, subsequent encounter  S42 295D           Start Time: 0900  Stop Time: 0945  Total time in clinic (min): 45 minutes    Subjective: Patient reports she is feeling good today  Objective: See treatment diary below      Assessment: Tolerated treatment well  Patient noted to have slightly improving mobility with PROM shoulder flexion  Added standing wall slides flexion with foam roller with improved tolerance and no increase in pain  Overall noted improved forward flexion mobility, however continues to have decreased ABD AROM past 90 deg  Improved mobility with manual overpressure  Patient exhibited good technique with therapeutic exercises and would benefit from continued PT      Plan: Continue per plan of care  Progress treatment as tolerated         Diagnosis: L proximal humerus fracture (DOI 2022)  Precautions: HTN, DM, hx of chemo, recent nephro stent (2022)  ( * astericks indicates given per HEP)    Manuals 12/15 12/19 12/28 12/30 1/3/23 1/5/23 1/10/23   STM DK DK DK DK DK DK DK   IASTM          PROM DK DK DK DK DK DK DK             Neuro Re-Ed          Mid Rows          Shoulder Ext          Web Slide IR/ER  2x10 GTBea 2x10 GTBea 2x10 GTBea 2x10 GTBea 2x10 GTBea 2x10 GTBea             Ther Ex          physioball rollouts seated          Table slides flex and ABD (standing)          AAROM ER with cane  Seated 2x10 Seated 2x10 Seated 2x10 Seated 2x10 Seated 2x10 Seated 2x10   AAROM flexion with cane  supine 2x10 supine 2x10, stand x10 supine 2x10, stand x10 supine 2x10, stand x10 supine 2x10, stand x10 supine 2x10, stand x10   AAROM flexion cane with overpressure     Standing x10 Standing x10 Standing x10   AAROM ABD with cane Standing 2x10 Standing 2x10 Standing 2x10 Standing 2x10 Standing 2x10 Standing 2x10   Wall slides with pillow case  Flex 2x10  Scap x15 Flex 2x10  Scap x15 Flex 2x10  Scap 2x10 Flex 2x10  Scap 2x10 Flex 2x10  Scap 2x10    Wall slides with foam roller       Flex 2x10   Pulleys  Seated flex + scap 5s, 2x10 Seated flex + scap 5s, 2x10 Seated flex + scap 5s, 2x10 Seated flex + scap 5s, 2x10 Seated flex + scap 5s, 2x10 Seated flex + scap 5s, 2x10   IR stretch with strap  5" x15 standing                  Ther Activity          UBE    retro only x2' FWD only x2' FWD x2', BWD x1' FWD x2', BWD x1'   Shoulder Shelf taps Flexion  #1 x10 #1 2x5 #1 x15 #1 x15 #1 x15 #1 x15   Pt Ed HEP         Re-Evaluation DK         Modalities          Heat/ice (prn)

## 2023-01-12 ENCOUNTER — OFFICE VISIT (OUTPATIENT)
Dept: PHYSICAL THERAPY | Facility: CLINIC | Age: 68
End: 2023-01-12

## 2023-01-12 DIAGNOSIS — S42.295D OTHER CLOSED NONDISPLACED FRACTURE OF PROXIMAL END OF LEFT HUMERUS WITH ROUTINE HEALING, SUBSEQUENT ENCOUNTER: Primary | ICD-10-CM

## 2023-01-12 NOTE — PROGRESS NOTES
Daily Note     Today's date: 2023  Patient name: Nathanael Lyons  : 1955  MRN: [id number]  Referring provider: Teodoro Cook DO  Dx:   Encounter Diagnosis     ICD-10-CM    1  Other closed nondisplaced fracture of proximal end of left humerus with routine healing, subsequent encounter  S42 295D           Start Time: 845  Stop Time: 930  Total time in clinic (min): 45 minutes    Subjective: Patient reports her shoulder is feeling a lot better  She reports she is able to wash her hair and put on her coat without too much pain or difficulty  Patient reports she was able to put on her heavier jacket over the weekend without much difficulty  Objective: See treatment diary below      Assessment: Tolerated treatment well  Initiated session with PROM of L shoulder to tolerance  Continues to have firm end-feel, with mild ERP noted  Slightly improved mobility with GHJ stabilization and overpressure  Increased weight with shoulder taps on shelf to 2#, with some difficulty noted but no significant increase in pain  Completed all exercises with good effort and no significant soreness reported  Patient exhibited good technique with therapeutic exercises and would benefit from continued PT      Plan: Continue per plan of care  Progress note during next visit        Diagnosis: L proximal humerus fracture (DOI 2022)  Precautions: HTN, DM, hx of chemo, recent nephro stent (2022)  ( * astericks indicates given per HEP)    Manuals 12/15 12/19 12/28 12/30 1/3/23 1/5/23 1/10/23 1/12/23   STM DK DK DK DK DK DK DK DK   IASTM           PROM DK DK DK DK DK DK DK DK              Neuro Re-Ed           Mid Rows           Shoulder Ext           Web Slide IR/ER  2x10 GTBea 2x10 GTBea 2x10 GTBea 2x10 GTBea 2x10 GTBea 2x10 GTBea 2x10 BTBea              Ther Ex           physioball rollouts seated           Table slides flex and ABD (standing)           AAROM ER with cane  Seated 2x10 Seated 2x10 Seated 2x10 Seated 2x10 Seated 2x10 Seated 2x10 Seated 2x10   AAROM flexion with cane  supine 2x10 supine 2x10, stand x10 supine 2x10, stand x10 supine 2x10, stand x10 supine 2x10, stand x10 supine 2x10, stand x10 supine 2x10, stand x10   AAROM flexion cane with overpressure     Standing x10 Standing x10 Standing x10 Standing x10   AAROM ABD with cane  Standing 2x10 Standing 2x10 Standing 2x10 Standing 2x10 Standing 2x10 Standing 2x10 Standing 2x10   Wall slides with pillow case  Flex 2x10  Scap x15 Flex 2x10  Scap x15 Flex 2x10  Scap 2x10 Flex 2x10  Scap 2x10 Flex 2x10  Scap 2x10     Wall slides with foam roller       Flex 2x10 Flex 2x10   Pulleys  Seated flex + scap 5s, 2x10 Seated flex + scap 5s, 2x10 Seated flex + scap 5s, 2x10 Seated flex + scap 5s, 2x10 Seated flex + scap 5s, 2x10 Seated flex + scap 5s, 2x10 Seated flex + scap 5s, 2x10   IR stretch with strap  5" x15 standing                    Ther Activity           UBE    retro only x2' FWD only x2' FWD x2', BWD x1' FWD x2', BWD x1' FWD x2', BWD x1'   Shoulder Shelf taps Flexion  #1 x10 #1 2x5 #1 x15 #1 x15 #1 x15 #1 x15 #2 x10,  #1 x10   Pt Ed HEP          Re-Evaluation DK          Modalities           Heat/ice (prn)

## 2023-01-13 ENCOUNTER — OFFICE VISIT (OUTPATIENT)
Dept: BARIATRICS | Facility: CLINIC | Age: 68
End: 2023-01-13

## 2023-01-13 ENCOUNTER — TELEPHONE (OUTPATIENT)
Dept: NUTRITION | Facility: CLINIC | Age: 68
End: 2023-01-13

## 2023-01-13 VITALS
WEIGHT: 120.5 LBS | BODY MASS INDEX: 24.29 KG/M2 | HEART RATE: 75 BPM | DIASTOLIC BLOOD PRESSURE: 62 MMHG | SYSTOLIC BLOOD PRESSURE: 110 MMHG | TEMPERATURE: 96.9 F | HEIGHT: 59 IN

## 2023-01-13 DIAGNOSIS — C54.1 ENDOMETRIAL CANCER (HCC): Chronic | ICD-10-CM

## 2023-01-13 DIAGNOSIS — Z92.21 HISTORY OF CHEMOTHERAPY: ICD-10-CM

## 2023-01-13 DIAGNOSIS — K91.2 POSTSURGICAL MALABSORPTION: ICD-10-CM

## 2023-01-13 DIAGNOSIS — Z48.815 ENCOUNTER FOR SURGICAL AFTERCARE FOLLOWING SURGERY OF DIGESTIVE SYSTEM: Primary | ICD-10-CM

## 2023-01-13 DIAGNOSIS — I10 BENIGN ESSENTIAL HYPERTENSION: ICD-10-CM

## 2023-01-13 DIAGNOSIS — Z98.84 BARIATRIC SURGERY STATUS: ICD-10-CM

## 2023-01-13 NOTE — TELEPHONE ENCOUNTER
Contacted [facility] to complete the COVID-19 screening for Monday's appointment  No answer  Left voice message requesting a call back to complete screening and confirm appointment

## 2023-01-13 NOTE — PROGRESS NOTES
Assessment/Plan:     Patient ID: Marcelo Brothers is a 74 y o  female  Bariatric Surgery Status     s/p Edgardo-En-Y Gastric Bypass with Dr Meena Cornelius in 2001  Patient with weight loss over the past 2 years due to decrease appetite; she has recurrent stage IB endometrial cancer  Patient states as of last month she is cancer free     She is worried about gaining weight as a result of being off chemo and regaining appetite  She has an appt with her RD this week to review her diet  · Continued/Maintain healthy weight loss with good nutrition intakes  · Adequate hydration with at least 64oz  fluid intake  · Follow diet as discussed  · Follow vitamin and mineral recommendations as reviewed with you  · Exercise as tolerated  · Colonoscopy referral made:  unsure - she will see a new PCP next week and will discuss with her now that chemo treatment is complete  · Mammo: UTD     · Follow-up in 1 year  We kindly ask that your arrive 15 minutes before your scheduled appointment time with your provider to allow our staff to room you, get your vital signs and update your chart  · Get lab work done   Please call the office if you need a script  It is recommended to check with your insurance BEFORE getting labs done to make sure they are covered by your policy  · Call our office if you have any problems with abdominal pain especially associated with fever, chills, nausea, vomiting or any other concerns  · All  Post-bariatric surgery patients should be aware that very small quantities of any alcohol can cause impairment and it is very possible not to feel the effect  The effect can be in the system for several hours  It is also a stomach irritant  · It is advised to AVOID alcohol, Nonsteroidal antiinflammatory drugs (NSAIDS) and nicotine of all forms   Any of these can cause stomach irritation/pain  · Discussed the effects of alcohol on a bariatric patient and the increased impairment risk  · Keep up the good work! Postsurgical Malabsorption   -At risk for malabsorption of vitamins/minerals secondary to malabsorption and restriction of intake from bariatric surgery  -TAKING taking adequate postop bariatric surgery vitamin supplementation  -Next set of bariatric labs ordered for approximately 2 weeks  -Patient received education about the importance of adhering to a lifelong supplementation regimen to avoid vitamin/mineral deficiencies      There are no diagnoses linked to this encounter  Subjective:      Patient ID: More Carmen is a 74 y o  female  s/p Edgardo-En-Y Gastric Bypass with Dr Jaspal Elkins in 2001  Presents to the office today for routine follow up  Tolerating diet without issues; denies N/V, dysphagia, reflux  Overall doing Well  Current:120  EWL: (Weight loss is ahead of schedule at this post surgical period )  Current BMI is Body mass index is 24 76 kg/m²  · Tolerating a regular diet-yes  · Eating at least 60 grams of protein per day-lately has been working on more protein   · Following 30/60 minute rule with liquids-yes  · Drinking at least 64 ounces of fluid per day- no  · Drinking carbonated beverages-no  · Sufficient exercise-no; in PT for arm fx   Wants to start walking   · Using NSAIDs regularly-aspirin daily but is taking PPI   · Using nicotine-no  · Using alcohol-no  · Supplements: 1 tab MVI + calcium + iron+ + Vitamin D + B12     · EWL is 102%, which places the patient ahead of schedule for expected post surgical weight loss at this time  The following portions of the patient's history were reviewed and updated as appropriate: allergies, current medications, past family history, past medical history, past social history, past surgical history and problem list     Review of Systems   Constitutional: Negative  Respiratory: Negative  Cardiovascular: Negative  Gastrointestinal: Negative  Musculoskeletal: Negative  Skin: Negative  Neurological: Negative  Psychiatric/Behavioral: Negative  Mood much improved          Objective:    /62   Pulse 75   Temp (!) 96 9 °F (36 1 °C) (Tympanic)   Ht 4' 10 5" (1 486 m)   Wt 54 7 kg (120 lb 8 oz)   LMP  (LMP Unknown)   BMI 24 76 kg/m²      Physical Exam  Vitals and nursing note reviewed  Constitutional:       Appearance: Normal appearance  She is obese  HENT:      Head: Normocephalic and atraumatic  Eyes:      Extraocular Movements: Extraocular movements intact  Pupils: Pupils are equal, round, and reactive to light  Cardiovascular:      Rate and Rhythm: Normal rate and regular rhythm  Pulmonary:      Effort: Pulmonary effort is normal       Breath sounds: Rhonchi (mild expiratory in left lung fields, pt states has been more congested with phlegm since recent tooth procedure  will discuss with her pcp at upcoming appt but i advised her sooner appt if any worsenign or new sxs) present  Abdominal:      General: Bowel sounds are normal       Tenderness: There is no abdominal tenderness  Musculoskeletal:         General: Normal range of motion  Cervical back: Normal range of motion  Skin:     General: Skin is warm and dry  Neurological:      General: No focal deficit present  Mental Status: She is alert and oriented to person, place, and time     Psychiatric:         Mood and Affect: Mood normal

## 2023-01-16 ENCOUNTER — NUTRITION (OUTPATIENT)
Dept: NUTRITION | Facility: CLINIC | Age: 68
End: 2023-01-16

## 2023-01-16 VITALS — BODY MASS INDEX: 25.06 KG/M2 | WEIGHT: 122 LBS

## 2023-01-16 DIAGNOSIS — Z71.3 NUTRITIONAL COUNSELING: Primary | ICD-10-CM

## 2023-01-16 NOTE — PATIENT INSTRUCTIONS
Nutrition Rx & Recommendations:  Diet: Soft/Moist, High Calorie, High Protein Diet  Small, frequent meals/snacks may be easier to tolerate than 3 large daily meals  Aim for 5-6 small meals per day (every 2-3 hours)  Include protein at all meals/snacks  Follow a Cancer Preventative Nutrition Pattern: colorful, plant-based, low-fat, avoid added sugars, limit alcohol, include high fiber foods, limit processed meats, limit red meat, choose lean protein sources, use low-fat cooking methods, balance calories with physical activity, avoid excessive weight gain throughout life  Avoid spicy, acidic, sharp/hard/crunchy foods & carbonated beverages as needed  Follow proper oral care; Try baking soda/salt water rinse recipe (mix 3/4 tsp salt + 1 tsp baking soda + 1 qt water; rinse with plain water after using) in Eating Hints book (pg 18)  Brush your teeth before/after meals & before bed  For constipation: drink plenty of fluids (>64 oz/day); drink hot liquids; eat high-fiber foods as tolerated (whole grains, beans, peas, nuts, seeds, fruits, vegetables, etc); increase physical activity as tolerated  Avoid increasing fiber intake too quickly, add fiber into your diet slowly; keep a record of your bowel movements (see pages 13-14 in you Eating Hints book)  For sore mouth/throat: choose foods that are easy to chew/swallow; cook foods until they are soft/tender; moisten & soften foods (gravy/sauce/broth/yogurt); cut food into small pieces; drink with a straw (as tolerated); eat with a very small spoon; eat cold or room temperature food; suck on ice chips; Avoid citrus, spicy foods, tomatoes/ketchup, salty foods, raw vegetables, sharp/crunchy/hard foods & alcohol (see pages 23-28 in your Eating Hints book)    For dry mouth: sip water throughout the day; try very sweet (or tart, as tolerated) drinks; chew gum or suck on hard candy, popsicles, & ice chips; eat easy to swallow foods (such as pureed cooked foods or soups); moisten food with sauce/gravy/salad dressing; do not drink beer, wine, or any type of alcohol; keep lips moist with lip balm; avoid tobacco products & second-hand smoke; try Biotene as needed (see pages 17-18 in your Eating Hints book)  Follow proper oral care; Try baking soda/salt water rinse recipe (mix 3/4 tsp salt + 1 tsp baking soda + 1 qt water; rinse with pain water after using) in Eating Hints book (pg 18)  Brush your teeth before/after meals & before bed  Weigh yourself regularly  If you notice weight loss, make an effort to increase your daily food/calorie intake  If you continue to notice loss after these efforts, reach out to your dietitian to establish a plan to stabilize weight     Try dry mouth spray and using a humidifier at bedside to help manage your dry mouth at night  Work on pre-planning meals/snacks and keep a food journal if you find it to be helpful  Resume apple/prune sauce recipe for constipation    Patient choosen goals:  Aim for 5-6 small meals per day (every 2-3 hrs)  Choose a lean protein + fiber source at all eating occasions  Try incorporating a Premier shake once a day and as needed    Follow Up Plan: 2/6 at 11am   Recommend Referral to Other Providers: none at this time

## 2023-01-16 NOTE — PROGRESS NOTES
PT Re-Evaluation     Today's date: 2023  Patient name: Isrrael Jerez  : 1955  MRN: [id number]  Referring provider: Erasmo Beltran DO  Dx:   Encounter Diagnosis     ICD-10-CM    1  Other closed nondisplaced fracture of proximal end of left humerus with routine healing, subsequent encounter  S42 295D           Start Time: 0900  Stop Time: 0945  Total time in clinic (min): 45 minutes    Assessment  Assessment details: Patient is a 74 y o  female who presents to outpatient PT IE s/p fall on 2022 with Left proximal humeral fracture  Patient is about 5 months post fracture at this time  Patient has attended PT for 31 visits thus far  Patient reports to PT re-evaluation with improving overall pain levels, reporting minimal soreness at worst with increased activities  Patient reports she is able to perform self-care activities such as UE dressing (coat, bra, etc) without significant difficulty or pain  Patient also reports improving overhead reaching and lifting, beneficial for performing certain household chores without pain or significant compensations  As noted on re-evaluation, patient noted to have improved AROM compared to previous session, however no changes in PROM  This indicates possible GHJ / capsular stiffness continues to be limited with PROM, however with improving muscle activation and motor control with AROM  Patient however continues to have limited ROM from achieving WFLs, especially with flexion and abduction  PROM continues to be a firm end-feel with mild ERP noted at available ROM  Nonetheless, she reports she is able to perform ADLs and household chores required to perform daily without significant difficulty or reports of pain  Patient also reports good carryover of exercises at home  Patient was advised strongly to continue with self-stretches/AAROM exercises at home to assist with shoulder mobility in all directions for ADLs   Patient has reported improved ability with opening car door, which was her primary concern a few months ago  Patient is Right-handed and is thus is able to assist and perform most activities with use of Right hand  Patient otherwise reports she is independent with ADLs and self-care tasks, however lives with her mother who assists her in certain other tasks and household chores  Patient would benefit from continued skilled PT services to further improve her Left shoulder mobility, improve UE strength, improve postural mechanics, reduce pain levels, and progress overall functional mobility in order to perform ADLs and self-care tasks more efficiently and easily and to overall maximize function  Please note that progress was slow at times due to patient's medical history and was undergoing chemotherapy treatment  Plan to discharge from PT to [facility] in about 2 weeks due to good progress made with function thus far and reports of minimal to no pain with ADLs  Thank you for the referral     Impairments: abnormal muscle firing, abnormal or restricted ROM, abnormal movement, activity intolerance, impaired physical strength, lacks appropriate home exercise program, pain with function, scapular dyskinesis, poor posture  and poor body mechanics  Functional limitations: reaching overhead/laterally, liftingBarriers to therapy: Undergoing chemo, nephro stents, extensive medical hx  Understanding of Dx/Px/POC: good   Prognosis: good    Goals  Impairment Goals 4-6 weeks  In order to improve and maximize function patient will be able to     - Decrease pain intensity to <3/10  Met  - Improve shoulder PROM to [facility] without pain throughout to improve flexibility of GHJ  Improving  - Improve shoulder AROM to [facility] without pain throughout to improve mobility of shoulder for varying reaching tasks  Improving  - Improved shoulder MMT to 4/5 to improve ability with lifting/carrying light to moderately heavier objects   Improving    Functional Goals 6-8 weeks  In order to improve and maximize function patient will be able to     - Return to Prior Level of Function with no greater than 2/10 pain  Partially Met   - Increase Functional Status Measure (FOTO) to > = expected at Discharge  Met  - Be independent and compliant with HEP  Partially Met  - Have the ROM necessary for performing dressing/bathing activities without difficulty  Partially Met  - Sleep throughout the night without disturbances due to pain  Met  - Improve ability to lift/carry light objects overhead for better ease with household chores and ADLs  Improved, on average 5lbs tolerated  - Improve ability to open car door without difficulty or pain to improve ease with getting into car or opening other doors  Partially Met    Plan  Plan details: Plan to discharge from PT on 2/2/2023 as appropriate  Patient would benefit from: skilled PT  Planned modality interventions: cryotherapy  Other planned modality interventions: moist heat  Planned therapy interventions: joint mobilization, manual therapy, neuromuscular re-education, patient education, strengthening, stretching, therapeutic activities, therapeutic exercise, home exercise program, functional ROM exercises, postural training, body mechanics training, flexibility, Paz taping and massage  Frequency: 1-2x week  Duration in weeks: 4  Treatment plan discussed with: patient        Subjective Evaluation    History of Present Illness  Date of onset: 8/13/2022  Mechanism of injury: trauma  Mechanism of injury: Chastity Lee is a 74y o  year-old female who presents to outpatient PT s/p Left proximal humeral fracture that occurred on 8/13/2022  Patient reports she was home, she was wearing flipflops when her flipflop got stuck to a door  Patient reports she fell forward and onto Left shoulder  Patient reports she was able to get up by herself, and then called her sister  Patient did not want to go to ER, however was convinced by her sister to go on 8/15/2022   Patient received x-ray at the time that revealed proximal humeral fracture (see full report for details)  Patient was given sling and sent home  She had follow up on 2022 and received follow up x-rays again that stated fracture was healing well  Patient continues to be in sling at this time, with precautions for no strengthening for 6 weeks  Patient has follow up MD appointment on 10/4/2022  Patient is independent with most ADLs and self-care tasks, but requires assistance with putting her sling on  Patient is not driving at this time  Quality of life: good    Pain  Current pain ratin  At best pain ratin  At worst pain ratin  Location: L shoulder  Quality: dull ache  Relieving factors: medications and support  Aggravating factors: overhead activity and lifting  Progression: improved    Social Support  Lives with: mother  Hand dominance: right      Diagnostic Tests  X-ray: abnormal (8/15, : L shoulder)  Treatments  Previous treatment: immobilization  Current treatment: physical therapy  Patient Goals  Patient goals for therapy: decreased pain, increased motion, independence with ADLs/IADLs, return to sport/leisure activities and increased strength          Objective     Postural Observations  Seated posture: good  Standing posture: good    Additional Postural Observation Details  Left shoulder slightly guarded, slightly rounded/kyphotic thoracic posture    Palpation   Left   Muscle spasm in the anterior deltoid, middle deltoid and triceps  Tenderness     Left Shoulder   No tenderness in the [facility] joint, biceps tendon (proximal) and bicipital groove       Active Range of Motion   Left Shoulder   Flexion: 110 degrees   Extension: 60 degrees   Abduction: 105 degrees   External rotation 0°: 75 degrees   External rotation BTH: T2   Internal rotation BTB: T12     Right Shoulder   Normal active range of motion    Passive Range of Motion   Left Shoulder   Flexion: 120 degrees   Abduction: 80 degrees   External rotation 0°: 75 degrees   External rotation 45°: 60 degrees     Right Shoulder   Normal passive range of motion    Scapular Mobility   Left Shoulder   Scapular mobility: fair    Additional Scapular Mobility Details  Passive mobility with significant stiffness in distraction, upward and downward rotations  Joint Play   Left Shoulder  Joints within functional limits are the anterior capsule and inferior capsule  Hypomobile in the posterior capsule  Strength/Myotome Testing     Left Shoulder     Planes of Motion   Flexion: 3+   Abduction: 3+   External rotation at 0°: 4-   Internal rotation at 0°: 3+     Isolated Muscles   Biceps: 4   Triceps: 4-     Right Shoulder   Normal muscle strength    Additional Strength Details  L shoulder MMT assessed at available AROM   Strength (Lv II in neutral): Left 20, 25, 20 lbs; Right 25, 20, 18 lbs       Subjective: Patient reports she is able to do most activities independently without significant pain or difficulty   She is able to perform ADLs and household chores without difficulty        Diagnosis: L proximal humerus fracture (DOI 8/13/2022)  Precautions: HTN, DM, hx of chemo, recent nephro stent (9/19/2022)  ( * astericks indicates given per HEP)    Manuals 1/3 1/5 1/10 1/12 1/17   STM DK DK DK DK DK   IASTM        PROM DK DK DK DK DK           Neuro Re-Ed        Mid Rows        Shoulder Ext        Web Slide IR/ER 2x10 GTBea 2x10 GTBea 2x10 GTBea 2x10 BTBea            Ther Ex        physioball rollouts seated        Table slides flex and ABD (standing)        AAROM ER with cane Seated 2x10 Seated 2x10 Seated 2x10 Seated 2x10 Seated 2x10   AAROM flexion with cane supine 2x10, stand x10 supine 2x10, stand x10 supine 2x10, stand x10 supine 2x10, stand x10 supine 2x10, stand x10   AAROM flexion cane with overpressure Standing x10 Standing x10 Standing x10 Standing x10 Standing x10   AAROM ABD with cane Standing 2x10 Standing 2x10 Standing 2x10 Standing 2x10 Standing 2x10   Wall slides with pillow case Flex 2x10  Scap 2x10 Flex 2x10  Scap 2x10      Wall slides with foam roller   Flex 2x10 Flex 2x10 Flex 2x10   Pulleys Seated flex + scap 5s, 2x10 Seated flex + scap 5s, 2x10 Seated flex + scap 5s, 2x10 Seated flex + scap 5s, 2x10 Seated flex + scap 5s, 2x10   IR stretch with strap                Ther Activity        UBE FWD only x2' FWD x2', BWD x1' FWD x2', BWD x1' FWD x2', BWD x1' FWD x2', BWD x1'   Shoulder Shelf taps Flexion #1 x15 #1 x15 #1 x15 #2 x10,  #1 x10 #2 x15   Pt Ed        Re-Evaluation     DK   Modalities        Heat/ice (prn)

## 2023-01-17 ENCOUNTER — EVALUATION (OUTPATIENT)
Dept: PHYSICAL THERAPY | Facility: CLINIC | Age: 68
End: 2023-01-17

## 2023-01-17 DIAGNOSIS — S42.295D OTHER CLOSED NONDISPLACED FRACTURE OF PROXIMAL END OF LEFT HUMERUS WITH ROUTINE HEALING, SUBSEQUENT ENCOUNTER: Primary | ICD-10-CM

## 2023-01-18 ENCOUNTER — RA CDI HCC (OUTPATIENT)
Dept: OTHER | Facility: HOSPITAL | Age: 68
End: 2023-01-18

## 2023-01-18 NOTE — PROGRESS NOTES
[facility]  coding opportunities       Chart reviewed, no opportunity found:   [address]        Patients Insurance     Medicare Insurance: [employer] Advantage

## 2023-01-19 ENCOUNTER — OFFICE VISIT (OUTPATIENT)
Dept: PHYSICAL THERAPY | Facility: CLINIC | Age: 68
End: 2023-01-19

## 2023-01-19 DIAGNOSIS — S42.295D OTHER CLOSED NONDISPLACED FRACTURE OF PROXIMAL END OF LEFT HUMERUS WITH ROUTINE HEALING, SUBSEQUENT ENCOUNTER: Primary | ICD-10-CM

## 2023-01-19 NOTE — PROGRESS NOTES
Daily Note     Today's date: 2023  Patient name: Chastity Lee  : 1955  MRN: [id number]  Referring provider: Joseph Sharma DO  Dx:   Encounter Diagnosis     ICD-10-CM    1  Other closed nondisplaced fracture of proximal end of left humerus with routine healing, subsequent encounter  S42 295D           Start Time: 0815  Stop Time: 0900  Total time in clinic (min): 45 minutes    Subjective: Patient reports improving shoulder mobility, no changes in pain  Objective: See treatment diary below      Assessment: Tolerated treatment well  Initiated session with UBE with good response Patient noted to have some ERP with flexion PROM, however slightly improved with GHJ stabilization and repetition and cuing for relaxation  Improving ability to perform shoulder taps flexion, but muscle soreness reported after  Added web slide shoulder flexion to improve overhead reaching  Performed with good effort and without increase in pain  Some muscle soreness reported end of session but relieved with rest  Patient exhibited good technique with therapeutic exercises and would benefit from continued PT      Plan: Continue per plan of care  Progress treatment as tolerated         Diagnosis: L proximal humerus fracture (DOI 2022)  Precautions: HTN, DM, hx of chemo, recent nephro stent (2022)  ( * astericks indicates given per HEP)    Manuals 1/3 1/5 1/10 1/12 1/17 1/19   STM DK DK DK DK DK DK   IASTM         PROM DK DK DK DK DK DK            Neuro Re-Ed         Mid Rows         Shoulder Ext         Web Slide IR/ER 2x10 GTBea 2x10 GTBea 2x10 GTBea 2x10 BTBea  2x10 BTBea   Web Slide shoulder Flex      GTB x15            Ther Ex         physioball rollouts seated         Table slides flex and ABD (standing)         AAROM ER with cane Seated 2x10 Seated 2x10 Seated 2x10 Seated 2x10 Seated 2x10 Seated 2x10   AAROM flexion with cane supine 2x10, stand x10 supine 2x10, stand x10 supine 2x10, stand x10 supine 2x10, stand x10 supine 2x10, stand x10 supine 2x10, stand x10   AAROM flexion cane with overpressure Standing x10 Standing x10 Standing x10 Standing x10 Standing x10    AAROM ABD with cane Standing 2x10 Standing 2x10 Standing 2x10 Standing 2x10 Standing 2x10 Standing 2x10   Wall slides with pillow case Flex 2x10  Scap 2x10 Flex 2x10  Scap 2x10       Wall slides with foam roller   Flex 2x10 Flex 2x10 Flex 2x10 NV   Pulleys Seated flex + scap 5s, 2x10 Seated flex + scap 5s, 2x10 Seated flex + scap 5s, 2x10 Seated flex + scap 5s, 2x10 Seated flex + scap 5s, 2x10 Seated flex + scap 5s, 2x10   IR stretch with strap                  Ther Activity         UBE for improving UE mobility and posture @ start FWD only x2' FWD x2', BWD x1' FWD x2', BWD x1' FWD x2', BWD x1' FWD x2', BWD x1' FWD x2', BWD x1'   Shoulder Shelf taps Flexion #1 x15 #1 x15 #1 x15 #2 x10,  #1 x10 #2 x15 #2 x15   Pt Ed         Re-Evaluation     DK    Modalities         Heat/ice (prn)

## 2023-01-20 ENCOUNTER — OFFICE VISIT (OUTPATIENT)
Dept: FAMILY MEDICINE CLINIC | Facility: CLINIC | Age: 68
End: 2023-01-20

## 2023-01-20 VITALS
HEART RATE: 64 BPM | DIASTOLIC BLOOD PRESSURE: 70 MMHG | BODY MASS INDEX: 24.39 KG/M2 | RESPIRATION RATE: 16 BRPM | OXYGEN SATURATION: 96 % | HEIGHT: 59 IN | WEIGHT: 121 LBS | SYSTOLIC BLOOD PRESSURE: 110 MMHG

## 2023-01-20 DIAGNOSIS — D70.1 CHEMOTHERAPY INDUCED NEUTROPENIA (HCC): ICD-10-CM

## 2023-01-20 DIAGNOSIS — E78.5 DYSLIPIDEMIA: ICD-10-CM

## 2023-01-20 DIAGNOSIS — C79.89 SECONDARY MALIGNANT NEOPLASM OF OTHER SPECIFIED SITES (HCC): ICD-10-CM

## 2023-01-20 DIAGNOSIS — Z76.89 ENCOUNTER TO ESTABLISH CARE: Primary | ICD-10-CM

## 2023-01-20 DIAGNOSIS — E44.0 MODERATE PROTEIN-CALORIE MALNUTRITION (HCC): ICD-10-CM

## 2023-01-20 DIAGNOSIS — Z80.3 FAMILY HISTORY OF BREAST CANCER IN SISTER: ICD-10-CM

## 2023-01-20 DIAGNOSIS — C54.1 ENDOMETRIAL CANCER (HCC): Chronic | ICD-10-CM

## 2023-01-20 DIAGNOSIS — Z12.11 COLON CANCER SCREENING: ICD-10-CM

## 2023-01-20 DIAGNOSIS — I82.4Y1 ACUTE DEEP VEIN THROMBOSIS (DVT) OF PROXIMAL VEIN OF RIGHT LOWER EXTREMITY (HCC): ICD-10-CM

## 2023-01-20 DIAGNOSIS — I10 BENIGN ESSENTIAL HYPERTENSION: ICD-10-CM

## 2023-01-20 DIAGNOSIS — M80.00XS OSTEOPOROSIS WITH CURRENT PATHOLOGICAL FRACTURE, UNSPECIFIED OSTEOPOROSIS TYPE, SEQUELA: ICD-10-CM

## 2023-01-20 DIAGNOSIS — Z71.85 VACCINE COUNSELING: ICD-10-CM

## 2023-01-20 DIAGNOSIS — Z80.0 FAMILY HISTORY OF COLON CANCER IN MOTHER: ICD-10-CM

## 2023-01-20 DIAGNOSIS — D64.89 DRUG-INDUCED ANEMIA: ICD-10-CM

## 2023-01-20 DIAGNOSIS — N18.31 CHRONIC KIDNEY DISEASE, STAGE 3A (HCC): ICD-10-CM

## 2023-01-20 DIAGNOSIS — Z12.31 SCREENING MAMMOGRAM, ENCOUNTER FOR: ICD-10-CM

## 2023-01-20 DIAGNOSIS — Z93.6 NEPHROSTOMY STATUS (HCC): ICD-10-CM

## 2023-01-20 DIAGNOSIS — Z23 NEED FOR INFLUENZA VACCINATION: ICD-10-CM

## 2023-01-20 DIAGNOSIS — D50.8 OTHER IRON DEFICIENCY ANEMIA: ICD-10-CM

## 2023-01-20 DIAGNOSIS — Z23 NEED FOR PNEUMOCOCCAL VACCINATION: ICD-10-CM

## 2023-01-20 DIAGNOSIS — F33.9 DEPRESSION, RECURRENT (HCC): ICD-10-CM

## 2023-01-20 DIAGNOSIS — T45.1X5A CHEMOTHERAPY INDUCED NEUTROPENIA (HCC): ICD-10-CM

## 2023-01-20 RX ORDER — GEMFIBROZIL 600 MG/1
600 TABLET, FILM COATED ORAL DAILY
Qty: 30 TABLET | Refills: 0 | Status: SHIPPED | OUTPATIENT
Start: 2023-01-20

## 2023-01-20 NOTE — PROGRESS NOTES
Assessment/Plan:   Diagnoses and all orders for this visit:    Encounter to establish care  - reviewed PMHx, PSHx, meds, allergies, FHx, Soc Hx   - UTD with COVID IMMs and Booster x1 - advised to schedule 2nd Booster  - received PCV20 in the office today  - received annual Flu vaccine in the office today   - has script for labs from [address] [place] [address] TSH 12/2022  - script given for Lipids (of note, has been off gemfibrozil for "months" now) - f/u after labs - pt aware and agreeable   - UTD with screening Mammo (7/27/2022) - script given for repeat, of note (+) FHx of Breast Ca in Sister  - has never had screening Cscope - referred to Colorectal as per pt request, of note, (+) FHx of Colon Ca in Mom   - due for Osteoporosis screening - script given for DXA  - UTD with Optho and Dental   - RTO in 1month for AWV - pt aware and agreeable     Benign essential hypertension  - BP stable   - follows with Nephro   Chronic kidney disease, stage 3a ([facility] )  Nephrostomy status ([facility] )    Acute deep vein thrombosis (DVT) of proximal vein of right lower extremity ([facility] )  - self admins Lovenox     Osteoporosis with current pathological fracture, unspecified osteoporosis type, sequela  -     DXA bone density spine hip and pelvis; Future    Endometrial cancer ([facility] )  Secondary malignant neoplasm of other specified sites [facility])  - follows with Gyn/Onc - management as per primary team     Chemotherapy induced neutropenia ([facility] )  Other iron deficiency anemia  Drug-induced anemia  Moderate protein-calorie malnutrition ([facility] )  - labs pending   - follows with Nutritionist     Depression, recurrent ([facility] )  - follows with Psych and Therapist (has telephone sessions Qwk)     Dyslipidemia  -     Lipid panel; Future  -     gemfibrozil (LOPID) 600 mg tablet;  Take 1 tablet (600 mg total) by mouth daily  - script given for Lipids   - of note, has been off gemfibrozil for "months" now  - f/u after labs - pt aware and agreeable     Colon cancer screening  -     Ambulatory Referral to Colorectal Surgery; Future  Family history of colon cancer in mother    Screening mammogram, encounter for  -     Mammo screening bilateral w 3d & cad; Future  Family history of breast cancer in sister    Need for influenza vaccination  -     influenza vaccine, high-dose, PF 0 7 mL (FLUZONE HIGH-DOSE)  Need for pneumococcal vaccination  -     Pneumococcal Conjugate Vaccine 20-valent (Pcv20)  Vaccine counseling  - advised to schedule 2nd COVID Booster - pt aware and agreeable             Subjective:    Patient ID: Luis Patiño is a 74 y o  female    Luis Patiño is a 74 y o  female who presents to the office to establish care  - prior PCP: Dr Curtis Denis - PCP retired last [facility] 8/2022  - Specialists: Gyn/Onc, Psych, Therapist (Qwk), Uro, Nephro, Nutritionist   - PMHx: Endometrial Ca, HTN, HL, anemia, drug induced anemia, chemo induced neutropenia, Depression, h/o DVT, Osteoporosis  - allergies: Cephalosporin - rash   - Meds: see med rec   - PSHx: reviewed  - FHx: reviewed   - Immunizations: UTD with COVID IMMs and Booster x1, interested in annual Flu and PCV20 and will get both in office today  - GYN Hx: s/p hysterectomy   - Hm: due for Colon Ca screening, due for annual Mammo in July   - diet/exercise: active, trying to gain weight after cancer treatment   - social: denies tob/EtOH  - sexual Hx: n/a  - last vision: "[employer] - goes annually   - last dental: Christine Roland - goes [id number]   - ROS: today in the office pt denies F/C/N/V/HA/visual changes/palpitations/SOB/wheezing/abd pain/D/LE edema      The following portions of the patient's history were reviewed and updated as appropriate: allergies, current medications, past family history, past medical history, past social history, past surgical history and problem list     Review of Systems  as per HPI    Objective:  /70   Pulse 64   Resp 16   Ht 4' 10 5" (1 486 m)   Wt 54 9 kg (121 lb)   LMP  (LMP Unknown)   SpO2 96%   BMI 24 86 kg/m²    Physical Exam  Vitals reviewed  Constitutional:       General: She is not in acute distress  Appearance: Normal appearance  She is not ill-appearing, toxic-appearing or diaphoretic  HENT:      Head: Normocephalic and atraumatic  Right Ear: External ear normal       Left Ear: External ear normal       Nose: Nose normal    Eyes:      General: No scleral icterus  Right eye: No discharge  Left eye: No discharge  Extraocular Movements: Extraocular movements intact  Conjunctiva/sclera: Conjunctivae normal    Cardiovascular:      Rate and Rhythm: Normal rate and regular rhythm  Heart sounds: Normal heart sounds  Pulmonary:      Effort: Pulmonary effort is normal       Breath sounds: Normal breath sounds  Abdominal:      Palpations: Abdomen is soft  Musculoskeletal:         General: Normal range of motion  Cervical back: Normal range of motion  Right lower leg: No edema  Left lower leg: No edema  Skin:     General: Skin is warm  Neurological:      General: No focal deficit present  Mental Status: She is alert and oriented to person, place, and time  Psychiatric:         Mood and Affect: Mood normal          Behavior: Behavior normal          Depression Screening Follow-up Plan: Patient's depression screening was positive with a PHQ-2 score of   Their PHQ-9 score was 0  Continue regular follow-up with their psychologist/therapist/psychiatrist who is managing their mental health condition(s)

## 2023-01-23 ENCOUNTER — RA CDI HCC (OUTPATIENT)
Dept: OTHER | Facility: HOSPITAL | Age: 68
End: 2023-01-23

## 2023-01-23 NOTE — PROGRESS NOTES
[facility]  coding opportunities       Chart reviewed, no opportunity found:   [address]        Patients Insurance     Medicare Insurance: [employer]

## 2023-01-24 ENCOUNTER — OFFICE VISIT (OUTPATIENT)
Dept: PHYSICAL THERAPY | Facility: CLINIC | Age: 68
End: 2023-01-24

## 2023-01-24 DIAGNOSIS — S42.295D OTHER CLOSED NONDISPLACED FRACTURE OF PROXIMAL END OF LEFT HUMERUS WITH ROUTINE HEALING, SUBSEQUENT ENCOUNTER: Primary | ICD-10-CM

## 2023-01-24 NOTE — PROGRESS NOTES
Daily Note     Today's date: 2023  Patient name: Donna Oconnor  : 1955  MRN: [id number]  Referring provider: Yobany Lama DO  Dx:   Encounter Diagnosis     ICD-10-CM    1  Other closed nondisplaced fracture of proximal end of left humerus with routine healing, subsequent encounter  S42 102D                      Subjective: Pt reports that she has been doing well  Objective: See treatment diary below      Assessment: Tolerated treatment well  Patient demonstrated fatigue post treatment, exhibited good technique with therapeutic exercises and would benefit from continued PT  Pt continued to have hypomobility in the [address] joint  She demonstrated empty end feels with abduction and IR  Progressed periscapular strengthening this visit without increases in pain  Plan: Continue per plan of care  Progress treatment as tolerated         Diagnosis: L proximal humerus fracture (DOI 2022)  Precautions: HTN, DM, hx of chemo, recent nephro stent (2022)  ( * astericks indicates given per HEP)    Manuals 1/24  1/10 1/12 1/17 1/19   STM KB  DK DK DK DK   IASTM         PROM KB  DK DK DK DK            Neuro Re-Ed         Mid Rows webslide GTB 2x10        Shoulder Ext webslide GTB 2x10 B/L        Web Slide IR/ER 2x10 BTBea  2x10 GTBea 2x10 BTBea  2x10 BTBea   Web Slide shoulder Flex GTB x15     GTB x15            Ther Ex         physioball rollouts seated         Table slides flex and ABD (standing)         AAROM ER with cane Seated 2x10  Seated 2x10 Seated 2x10 Seated 2x10 Seated 2x10   AAROM flexion with cane supine 2x10, stand x10  supine 2x10, stand x10 supine 2x10, stand x10 supine 2x10, stand x10 supine 2x10, stand x10   AAROM flexion cane with overpressure   Standing x10 Standing x10 Standing x10    AAROM ABD with cane Standing 2x10  Standing 2x10 Standing 2x10 Standing 2x10 Standing 2x10   Wall slides with pillow case         Wall slides with foam roller Flex 2x10  Flex 2x10 Flex 2x10 Flex 2x10 NV   Pulleys Seated flex + scap 5s, 2x10  Seated flex + scap 5s, 2x10 Seated flex + scap 5s, 2x10 Seated flex + scap 5s, 2x10 Seated flex + scap 5s, 2x10   IR stretch with strap                  Ther Activity         UBE for improving UE mobility and posture @ start FWD x2', BWD x1'  FWD x2', BWD x1' FWD x2', BWD x1' FWD x2', BWD x1' FWD x2', BWD x1'   Shoulder Shelf taps Flexion #2 x10  #1 x15 #2 x10,  #1 x10 #2 x15 #2 x15   Pt Ed         Re-Evaluation     DK    Modalities         Heat/ice (prn)

## 2023-01-26 ENCOUNTER — TELEPHONE (OUTPATIENT)
Dept: GYNECOLOGIC ONCOLOGY | Facility: CLINIC | Age: 68
End: 2023-01-26

## 2023-01-26 ENCOUNTER — OFFICE VISIT (OUTPATIENT)
Dept: PHYSICAL THERAPY | Facility: CLINIC | Age: 68
End: 2023-01-26

## 2023-01-26 DIAGNOSIS — S42.295D OTHER CLOSED NONDISPLACED FRACTURE OF PROXIMAL END OF LEFT HUMERUS WITH ROUTINE HEALING, SUBSEQUENT ENCOUNTER: Primary | ICD-10-CM

## 2023-01-26 NOTE — TELEPHONE ENCOUNTER
Papito Fonseca from [employer] called into the office to confirm clinical trial information   Papito Fonseca can be reached back @ [phone]-

## 2023-01-26 NOTE — PROGRESS NOTES
Daily Note     Today's date: 2023  Patient name: Veronica Vieyra  : 1955  MRN: [id number]  Referring provider: Erica Canchola DO  Dx:   Encounter Diagnosis     ICD-10-CM    1  Other closed nondisplaced fracture of proximal end of left humerus with routine healing, subsequent encounter  [id number]                      Subjective: Pt has no c/o pain today  States her biggest difficulty is still reaching overhead and out to the side  Objective: See treatment diary below      Assessment: Tolerated treatment well  Pt presents with a firm end feel throughout all planes  She is most limited throughout flexion and abduction  Plan: Continue per plan of care  Progress treatment as tolerated         Diagnosis: L proximal humerus fracture (DOI 2022)  Precautions: HTN, DM, hx of chemo, recent nephro stent (2022)  ( * astericks indicates given per HEP)    Manuals 1/24 1/26 1/10 1/12 1/17 1/19   STM KB  DK DK DK DK   IASTM         PROM KB CMF DK DK DK DK            Neuro Re-Ed         Mid Rows webslide GTB 2x10 GTB 2x10       Shoulder Ext webslide GTB 2x10 B/L GTB 2x10       Web Slide IR/ER 2x10 BTBea BTB 2x15 each 2x10 GTBea 2x10 BTBea  2x10 BTBea   Web Slide shoulder Flex GTB x15 GTB 2x10    GTB x15            Ther Ex         physioball rollouts seated         Table slides flex and ABD (standing)         AAROM ER with cane Seated 2x10 Seated 2x10 Seated 2x10 Seated 2x10 Seated 2x10 Seated 2x10   AAROM flexion with cane supine 2x10, stand x10 Supine 2x10  supine 2x10, stand x10 supine 2x10, stand x10 supine 2x10, stand x10 supine 2x10, stand x10   AAROM flexion cane with overpressure   Standing x10 Standing x10 Standing x10    AAROM ABD with cane Standing 2x10 Standing 2x10 Standing 2x10 Standing 2x10 Standing 2x10 Standing 2x10   Wall slides with pillow case         Wall slides with foam roller Flex 2x10  Flex 2x10 Flex 2x10 Flex 2x10 NV   Pulleys Seated flex + scap 5s, 2x10 Seated flex and scap :05 2x10 Seated flex + scap 5s, 2x10 Seated flex + scap 5s, 2x10 Seated flex + scap 5s, 2x10 Seated flex + scap 5s, 2x10   IR stretch with strap                  Ther Activity         UBE for improving UE mobility and posture @ start FWD x2', BWD x1' FWD x2', BWD x1' FWD x2', BWD x1' FWD x2', BWD x1' FWD x2', BWD x1' FWD x2', BWD x1'   Shoulder Shelf taps Flexion #2 x10 #2 x10 #1 x15 #2 x10,  #1 x10 #2 x15 #2 x15   Pt Ed         Re-Evaluation     DK    Modalities         Heat/ice (prn)

## 2023-01-27 NOTE — PROGRESS NOTES
Outpatient Oncology Nutrition Consult  Type of Consult: Follow Up  Care Location: Office Visit   Nutrition Assessment:  Oncology Diagnosis & Treatments:   Endometrial cancer  S/p surgery 12/19/17  Recurrence 7/8/19  S/p chemotherapy (carboplatin/taxol from 7/30/19-1/6/20)  S/p whole pelvis RT (2/4/20- 4/13/20)  Disease progression  S/p ENDOBAR trial 12/21/20-12/5/22    Oncology History   Endometrial cancer ([facility] [address] )   11/17/2017 Initial Diagnosis    Endometrial cancer ([facility] [address] )     11/17/2017 Biopsy    ENDOMETRIAL BIOPSY: WELL DIFFERENTIATED ENDOMETRIAL ADENOCARCINOMA (FIGO I) WITH FOCALMUCINOUS FEATURES  Part B: ENDOCERVICAL POLYP:BENIGN ENDOCERVICAL      12/19/2017 Surgery    Robotic assisted total laparoscopic hysterectomy with bilateral salpingo-oophorectomy and sentinel bilateral pelvic lymph node dissection  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 (4 4 x 3 2 cm tumor, 9 4/15 4mm invasion, NO LVSI, washings revealed atypical cellular changes)     12/19/2017 Genetic Testing    Morrison testing negative     6/28/2019 Biopsy    A  Breast, Right, US BX Right Breast 1000 4 cmfn:  - Benign breast tissue with focal histiocytic aggregate  See comment   - Negative for atypia and in-situ or invasive carcinoma  7/8/2019 Recurrence    Presented with right lower extremity DVT and CT demonstrating right pelvic sidewall mass with venous, ureteral and nerve compression causing significant neuropathic pain  Biopsy:  Lymph Node, Right pelvic lymph node x3:  - High-grade adenocarcinoma  7/30/2019 - 1/6/2020 Chemotherapy    Taxol 175 mg/m2 and carboplatin AUC 6 every 21 days  Dose was reduced to taxol 135 mg/m2 and carboplatin AUC 5  Completed 6 cycles  Treatment protracted due to multiple hospital admissions       2/24/2020 - 4/13/2020 Radiation    adjuvant external beam radiation therapy to the whole pelvis to 4500 cGy followed by boost to gross disease of an additional 2200 cGy     11/23/2020 Progression    New necrotic adenopathy in the retroperitoneum on CT      12/21/2020 -  Chemotherapy    Began chemotherapy with rucaparib, atezolizumab, and bevacizumab as per [facility] clinical trial     Rucaparib held cycle 28 secondary to fatigue  Avastin held since cycle 30 for elevated UPC  Treatment held for one cycle after cycle 31 for mucositis/fatigue         PMH: RNYGB (2001 at [address])    Past Medical History:   Diagnosis Date   • Anemia    • Chemotherapy follow-up examination    • Depression    • Endometrial cancer ([facility] [address] ) 12/2017   • History of chemotherapy     started 12/2021endometrial cancer   • Hyperglycemia     vx type 2 dm -- last assessed 4/1/14; resolved 11/7/17   • Hyperlipidemia    • Hypertension    • Obesity     last assessed 10/14/17; resolved 11/7/17     Past Surgical History:   Procedure Laterality Date   • ABDOMINAL SURGERY      GASTRIC BYPASS   • BREAST BIOPSY Right 06/28/2019    core biopsy; benign   • CHOLECYSTECTOMY      at the time of gastric bypass   • COLONOSCOPY     • CT NEEDLE BIOPSY LYMPH NODE  07/08/2019   • FL GUIDED CENTRAL VENOUS ACCESS DEVICE INSERTION  11/12/2019   • GASTRIC BYPASS     • HYSTERECTOMY Bilateral 2017    total abdominal with salpingo-oophorectomy   • IR NEPHROSTOMY TO NEPHROURETERAL STENT  06/09/2022   • IR NEPHROSTOMY TO NEPHROURETERAL STENT  12/20/2022   • IR NEPHROSTOMY TUBE CHECK/CHANGE/REPOSITION/REINSERTION/UPSIZE  05/27/2022   • IR NEPHROSTOMY TUBE PLACEMENT  07/26/2019   • IR NEPHROURETERAL STENT CHECK/CHANGE/REPOSITION  04/06/2021   • IR NEPHROURETERAL STENT CHECK/CHANGE/REPOSITION  06/04/2021   • IR NEPHROURETERAL STENT CHECK/CHANGE/REPOSITION  09/03/2021   • IR NEPHROURETERAL STENT CHECK/CHANGE/REPOSITION  12/07/2021   • IR NEPHROURETERAL STENT CHECK/CHANGE/REPOSITION  03/08/2022   • IR NEPHROURETERAL STENT CHECK/CHANGE/REPOSITION  05/10/2022   • IR NEPHROURETERAL STENT CHECK/CHANGE/REPOSITION  9/19/2022   • IR PICC LINE  09/27/2019   • IR PORT PLACEMENT  07/26/2019   • IR PORT REMOVAL  09/20/2019   • OOPHORECTOMY Bilateral 2017   • MO INSJ TUNNELED CTR VAD W/SUBQ PORT AGE 5 YR/> Left 11/12/2019    Procedure: INSERTION VENOUS PORT ( PORT-A-CATH) IR;  Surgeon: Cindy Hyatt DO;  [facility];  Service: Interventional Radiology   • MO LAPS ABD PRTM&OMENTUM DX W/WO SPEC BR/WA SPX N/A 12/19/2017    Procedure: LAPAROSCOPY DIAGNOSTIC;  Surgeon: Jose Jorge MD;  Location: [facility];  Service: Gynecology Oncology   • MO LAPS W/RAD HYST W/BILAT LMPHADEC RMVL TUBE/OVARY N/A 12/19/2017    Procedure: HYSTERECTOMY LAPAROSCOPIC TOTAL ([address]) W/ ROBOTICS; BILATERAL SALPINGOOPHERECTOMY; LYMPH NODE DISSECTION; lysis of adhesions;  Surgeon: Jose Jorge MD;  Location: [facility];  Service: Gynecology Oncology   • TONSILLECTOMY     • US GUIDED BREAST BIOPSY RIGHT COMPLETE Right 06/28/2019       Review of Medications:   Vitamins, Supplements and Herbals: yes: Hx RNYGB regimen: Vitamin D, Folic Acid, Align, calcium citrate TID, B12 1000 mcg QD, MVI BID (MVI does not have iron);  Iron 65 mg QD (2 hrs seperate from calcium)    Current Outpatient Medications:   •  amoxicillin (AMOXIL) 500 mg capsule, , Disp: , Rfl:   •  ARIPiprazole (ABILIFY) 20 MG tablet, Take 20 mg by mouth in the morning , Disp: , Rfl:   •  aspirin (ECOTRIN LOW STRENGTH) 81 mg EC tablet, Take 81 mg by mouth daily, Disp: , Rfl:   •  BD PosiFlush 0 9 % SOLN, , Disp: , Rfl:   •  Calcium-Magnesium-Vitamin D (CALCIUM 500 PO), Take by mouth daily , Disp: , Rfl:   •  cholecalciferol (VITAMIN D3) 1,000 units tablet, Take 4 tablets (4,000 Units total) by mouth daily, Disp: 90 tablet, Rfl: 0  •  clotrimazole-betamethasone (LOTRISONE) 1-0 05 % cream, Apply topically 2 (two) times a day (Patient not taking: Reported on 1/13/2023), Disp: 30 g, Rfl: 0  •  Cranberry-Vitamin C 250-60 MG CAPS, Take 1 tablet by mouth in the morning, Disp: 90 capsule, Rfl: 3  •  Cyanocobalamin (VITAMIN B12 PO), Take by mouth daily , Disp: , Rfl:   •  dronabinol (MARINOL) 2 5 mg capsule, Take 1 capsule (2 5 mg total) by mouth 2 (two) times a day before meals, Disp: 30 capsule, Rfl: 0  •  enoxaparin (LOVENOX) 80 mg/0 8 mL, INJECT 0 8ML (80MG TOTAL)  SUBCUTANEOUSLY EVERY 24  HOURS, Disp: 72 mL, Rfl: 1  •  ergocalciferol (VITAMIN D2) 50,000 units, Take 1 capsule (50,000 Units total) by mouth once a week for 6 doses (Patient not taking: Reported on 11/29/2022), Disp: 6 capsule, Rfl: 0  •  estradiol (ESTRACE VAGINAL) 0 1 mg/g vaginal cream, Insert 1g nightly for 6 weeks, then 1-2x weekly  (Patient not taking: Reported on 1/20/2023), Disp: 42 5 g, Rfl: 1  •  folic acid (FOLVITE) 1 mg tablet, Take 1 tablet (1 mg total) by mouth daily, Disp: , Rfl: 0  •  Gemtesa 75 MG TABS, TAKE 75 MG BY MOUTH IN THE MORNING, Disp: 90 tablet, Rfl: 2  •  lisinopril (ZESTRIL) 5 mg tablet, Take 1 tablet (5 mg total) by mouth daily, Disp: 90 tablet, Rfl: 3  •  LORazepam (ATIVAN) 0 5 mg tablet, Take 1 tablet (0 5 mg total) by mouth every 6 (six) hours as needed for anxiety (or nausea), Disp: 36 tablet, Rfl: 1  •  methylprednisolone (MEDROL) 4 mg tablet, Take 1 tablet (4 mg total) by mouth in the morning Take 2 tables once a day for 5 days, take 1 tablet once a day for 5 days, take 1 tablet every other day for 14 days  (Patient not taking: Reported on 1/20/2023), Disp: 21 tablet, Rfl: 0  •  Multiple Vitamin (MULTIVITAMIN) tablet, Take 1 tablet by mouth daily, Disp: , Rfl:   •  naloxone (NARCAN) 4 mg/0 1 mL nasal spray, Administer 1 spray into a nostril   If no response after 2-3 minutes, give another dose in the other nostril using a new spray , Disp: 1 each, Rfl: 0  •  omeprazole (PriLOSEC) 20 mg delayed release capsule, Take 20 mg by mouth daily, Disp: , Rfl:   •  ondansetron (ZOFRAN) 8 mg tablet, Take 1 tablet (8 mg total) by mouth every 8 (eight) hours as needed for nausea or vomiting, Disp: 30 tablet, Rfl: 1  •  oxybutynin (DITROPAN XL) 15 MG 24 hr tablet, Take 1 tablet (15 mg total) by mouth daily at bedtime, Disp: 90 tablet, Rfl: 3  •  rucaparib (RUBRACA) 300 mg tablet, Take 600 mg by mouth every 12 (twelve) hours Take with or without food    Do not repeat a vomited dose , Disp: , Rfl:   •  saccharomyces boulardii (FLORASTOR) 250 mg capsule, Take 1 capsule (250 mg total) by mouth 2 (two) times a day, Disp: , Rfl: 0  •  senna (SENOKOT) 8 6 MG tablet, Take 1 tablet (8 6 mg total) by mouth 2 (two) times a day as needed for constipation, Disp: 60 tablet, Rfl: 0  •  sodium chloride, PF, 0 9 %, 10 mL by Intracatheter route daily (Patient not taking: Reported on 1/13/2023), Disp: 300 mL, Rfl: 3  •  venlafaxine (EFFEXOR) 75 mg tablet, Take 75 mg by mouth 3 (three) times a day  , Disp: , Rfl:     Most Recent Lab Results:  Lab Results   Component Value Date    WBC 5 41 01/28/2023    IRON 31 (L) 01/28/2023    TIBC 224 (L) 01/28/2023    FERRITIN 467 (H) 01/28/2023    CHOLESTEROL 184 01/28/2023    CHOL 183 10/27/2017    TRIG 82 01/28/2023    HDL 64 01/28/2023    LDLCALC 104 (H) 01/28/2023    ALT 8 01/28/2023    AST 8 (L) 01/28/2023    ALB 3 5 01/28/2023     10/27/2017     05/12/2017    SODIUM 138 01/28/2023    SODIUM 138 12/30/2022    K 4 3 01/28/2023    K 4 2 12/30/2022     (H) 01/28/2023    BUN 29 (H) 01/28/2023    BUN 25 12/30/2022    CREATININE 1 12 01/28/2023    CREATININE 1 13 12/30/2022    EGFR 51 01/28/2023    PHOS 4 2 (H) 01/28/2023    PHOS 4 2 (H) 12/30/2022    GLUCOSE 219 (H) 12/19/2017    POCGLU 97 08/18/2020    GLUF 86 01/28/2023    GLUF 86 12/30/2022    GLUC 86 12/30/2022    HGBA1C 5 2 10/31/2022    HGBA1C 4 8 07/07/2021    HGBA1C 5 1 02/03/2020    CALCIUM 8 8 01/28/2023    FOLATE 5 4 10/06/2019    MG 1 7 (L) 12/30/2022     Anthropometric Measurements:   Height: 59"  Ht Readings from Last 1 Encounters:   02/03/23 4' 10 5" (1 486 m)     Wt Readings from Last 20 Encounters:   02/06/23 54 4 kg (120 lb)   02/03/23 53 8 kg (118 lb 8 oz)   02/01/23 55 1 kg (121 lb 8 oz)   01/30/23 55 2 kg (121 lb 11 1 oz)   01/20/23 54 9 kg (121 lb)   01/16/23 55 3 kg (122 lb)   01/13/23 54 7 kg (120 lb 8 oz)   01/06/23 54 kg (119 lb)   12/30/22 54 2 kg (119 lb 8 oz)   12/20/22 52 2 kg (115 lb)   12/16/22 52 2 kg (115 lb)   12/13/22 52 2 kg (115 lb)   12/05/22 53 3 kg (117 lb 8 oz)   12/02/22 54 2 kg (119 lb 8 oz)   11/29/22 54 1 kg (119 lb 3 2 oz)   11/14/22 55 kg (121 lb 4 1 oz)   11/11/22 54 9 kg (121 lb)   10/27/22 56 2 kg (123 lb 12 8 oz)   10/25/22 56 4 kg (124 lb 5 4 oz)   10/21/22 56 5 kg (124 lb 8 oz)     Weight Hx:  Usual Weight: 270# before RNYGB in 2001; lowest post-op wt: 200#; wt typically fluctuates between 215-230# (prior to wt loss)  Varian: (2/25/20) 185 6#, (3/3/20) 188 6#, (3/10/20) 187 2#, (3/19/20) 186 2#, (3/24/20) 187#, (3/31/20) 185 4#, (4/10/20) 184 4#    Home Scale Wts: (2/19/20) 185#, (8/3/20) 194#    Oncology Nutrition-Anthropometrics    Flowsheet Row Nutrition from 2/6/2023 in [facility] Nutrition from 1/16/2023 in [facility]   Patient age (years): [age] years   Patient (female) height (in): 61 in 61 in   Current Weight to be used for anthropometric calculations (lbs) 120 lbs 122 lbs   Current Weight to be used for anthropometric calculations (kg) 54 5 kg 55 5 kg   BMI: 24 2 24 6   IBW female: 95 lbs 95 lbs   IBW (kg) female: 43 2 kg 43 2 kg   IBW % (female) 126 3 % 128 4 %   Adjusted BW (female): 101 3 lbs 101 8 lbs   Adjusted BW kg (female): 46 kg 46 3 kg   % weight change after 1 week: -1 4 % 2 5 %   Weight change after 1 week (lbs) -1 7 lbs 3 lbs   % weight change after 1 month: 0 8 % 6 1 %   Weight change after 1 month (lbs) 1 lbs 7 lbs   % weight change after 3 months: -0 8 % -2 2 %   Weight change after 3 months (lbs) -1 lbs -2 8 lbs   % weight change after 1 year: -- -9 6 %        Nutrition-Focused Physical Findings:  moderate muscle depletion ([place]) - hollowing/scooping/depression and moderate body fat depletion ([place]) - hollowing/depression/dark circles     Food/Nutrition-Related History & Client/Social History:    Current Nutrition Impact Symptoms:   [] Nausea  [x] Reduced Appetite  [] Acid Reflux   [] Vomiting  [] Unintended Wt Loss - hx  -pt does not want to regain the wt that she lost [x] Malabsorption - S/p RNYGB in 2001   [] Diarrhea  [] Unintended Wt Gain  [] Dumping Syndrome   [x] Constipation - plans to restart apple/prune sauce recipe again, needs to buy unprocessed bran [] Thick Mucous/Secretions  [] Abdominal Pain    [] Dysgeusia (Altered Taste)   [x] Xerostomia (Dry Mouth) - increased  -using Biotene & Xylimelts which help [] Gas    [] Dysosmia (Altered Smell)  [] Thrush  [] Difficulty Chewing    [] Oral Mucositis (Sore Mouth)  [] Fatigue  [] Pain:   [] Odynophagia   [] Esophagitis  [] Other:    [] Dysphagia [] Early Satiety  [] No Problems Eating      Food Allergies & Intolerances: yes: had skin patch testing which showed allergy to strawberries, but says she is able to eat strawberries without any side effects    Current Diet: Regular Diet, No Restrictions  Current Nutrition Intake: Decreased since last visit (d/t reduced hunger)   Appetite: Good as the day goes on, Poor in the morning   Nutrition Route: PO   Oral Care: Biotene , bs/sw rinses, & Xylimelts  Activity level: Sedentary  Limited by pain and medical condition      25 Hr Diet Recall: has trouble eating breakfast d/t a lack of appetite; has been doing more cooking at home  Breakfast: CIB mixed with 2% Fairlife milk  Snack: cheese stick  Lunch: went to [employer]: 1 small crab cake, stuffed mushrooms with crab meat  Snack: none; usually has pudding or jello or fruit  Dinner: ham and cheese sandwich with wise on an [place] muffin (ate 3/4)  Snack: 3 chicken wings    Beverages: water (16 9 oz x2-3), sweetened iced tea (32 oz x1)   -Does not drink coffee or alcohol   -Does not separate fluids from solids, does not have discomfort while eating and drinking at the same time  Supplements:   [employer] (0axl=470 kcal, 5 g pro) mixed with Fairlife milk - 1 per day     Oncology Nutrition-Estimated Needs    Flowsheet Row Nutrition from 3/28/2022 in [facility] Nutrition from 10/11/2021 in [place] [address] [facility]   Weight type used Actual weight Adjusted weight   Weight in kilograms (kg) used for estimated needs 54 1 kg 49 5 kg   Energy needs formula:  35-40 kcal/kg 35-40 kcal/kg   Energy needs based on 35 kcal/k kcal 1733 kcal   Energy needs based on 40 kcal/k kcal 1980 kcal   Protein needs formula: 1 5-2 g/kg 1 5-2 g/kg   Protein needs based on 1 5 g/kg 81 g 74 g   Protein needs based on 2 g/kg 108 g 99 g   Fluid needs formula: 30-35 mL/kg 30-35 mL/kg   Fluid needs based on 30 mL/kg 1623 mL 1485 mL   Fluid needs in ounces 55 oz 50 oz   Fluid needs based on 35 mL/kg 1894 mL 1733 mL   Fluid needs in ounces 64 oz 59 oz        Discussion & Intervention:    Ragini was evaluated today for an RD follow up regarding nutrition impact sx management, weight management and cancer-preventative eating  Ragini has completed tx for endometrial cancer  She is doing well today  She is eating a little bit less than last visit d/t a reduced appetite  She finds that her appetite improves as the day goes on  She is trying to drink a CIB shake each morning  She continues to struggle with constipation and is still planning on starting the apple/prune sauce again  Her mouth sores and dysgeusia have improved but xerostomia is ongoing  Her wt has been relatively stable since last visit      Reviewed 24 hour recall, which revealed an suboptimal po intake, and discussed ways to transition to a cancer preventative diet, balance energy intake to promote a healthy body weight and optimize nutrient intake to promote healing  Also reviewed the importance of wt management throughout the healing process and the role of a high kcal/ high protein diet in managing wt and overall health  Based on today's assessment, discussion included: MNT for: wt management, xerostomia, constipation, practicing proper oral care, choosing a variety of colorful, plant-based foods to support a cancer preventative diet, a soft/moist diet & food choices to include at all meals & snacks, adequate hydration & fluid choices, eating smaller more frequent meals every 2-3 hours (5-6 small meals/day), having consistent and planned snacks between meals, utilizing oral nutrition supplements, recipe suggestions/resources, trying new recipes, & cooking at home more often and balancing energy intake with physical activity  Moving forward, Ragini was encouraged to transition to a cancer-preventative lifestyle and practice MNT for nutrition impact sx management  She does best with self-directed goals and has chosen goals for herself moving forward; RD guidance provided during goal setting  Materials Provided: not applicable  All questions and concerns addressed during today’s visit  Ragini has RD contact information  Nutrition Diagnosis:  Increased Nutrient Needs (kcal & pro) related to increased demand for nutrients and disease state as evidenced by recovering from cancer tx  Altered GI Function related to alteration in GI tract integrity as evidenced by Constipation and Xerostomia (Dry Mouth)  Monitoring & Evaluation:   Goals:  weight maintenance/stabilization  adequate nutrition impact symptom management  transition to a cancer preventative eating pattern     Progress Towards Goals: Progressing    Nutrition Rx & Recommendations:  Small, frequent meals/snacks may be easier to tolerate than 3 large daily meals  Aim for 5-6 small meals per day (every 2-3 hours)  Include protein at all meals/snacks    Follow a Cancer Preventative Nutrition Pattern: colorful, plant-based, low-fat, avoid added sugars, limit alcohol, include high fiber foods, limit processed meats, limit red meat, choose lean protein sources, use low-fat cooking methods, balance calories with physical activity, avoid excessive weight gain throughout life  Follow proper oral care; Try baking soda/salt water rinse recipe (mix 3/4 tsp salt + 1 tsp baking soda + 1 qt water; rinse with plain water after using) in Eating Hints book (pg 18)  Brush your teeth before/after meals & before bed  For constipation: drink plenty of fluids (>64 oz/day); drink hot liquids; eat high-fiber foods as tolerated (whole grains, beans, peas, nuts, seeds, fruits, vegetables, etc); increase physical activity as tolerated  Avoid increasing fiber intake too quickly, add fiber into your diet slowly; keep a record of your bowel movements (see pages 13-14 in you Eating Hints book)  For dry mouth: sip water throughout the day; try very sweet (or tart, as tolerated) drinks; chew gum or suck on hard candy, popsicles, & ice chips; eat easy to swallow foods (such as pureed cooked foods or soups); moisten food with sauce/gravy/salad dressing; do not drink beer, wine, or any type of alcohol; keep lips moist with lip balm; avoid tobacco products & second-hand smoke; try Biotene as needed (see pages 17-18 in your Eating Hints book)  Follow proper oral care; Try baking soda/salt water rinse recipe (mix 3/4 tsp salt + 1 tsp baking soda + 1 qt water; rinse with pain water after using) in Eating Hints book (pg 18)  Brush your teeth before/after meals & before bed  Try dry mouth spray and using a humidifier at bedside to help manage your dry mouth at night  Work on pre-planning meals/snacks and keep a food journal if you find it to be helpful  Aim for 5-6 small meals per day (every 2-3 hrs)  Choose a lean protein + fiber source at all eating occasions    Patient choosen goals:   Increase fruit and vegetable intake (try blending frozen fruits and/or spinach/kale with CIB shake)  Continue to have something for breakfast, even if it is just a CIB shake  Resume apple prune sauce for constipation     Follow Up Plan: 3/6 at 10am   Recommend Referral to Other Providers: none at this time

## 2023-01-28 ENCOUNTER — LAB (OUTPATIENT)
Dept: LAB | Facility: CLINIC | Age: 68
End: 2023-01-28

## 2023-01-28 DIAGNOSIS — E78.5 DYSLIPIDEMIA: ICD-10-CM

## 2023-01-28 DIAGNOSIS — R80.9 PROTEINURIA, UNSPECIFIED TYPE: ICD-10-CM

## 2023-01-28 DIAGNOSIS — N13.30 HYDRONEPHROSIS OF RIGHT KIDNEY: ICD-10-CM

## 2023-01-28 DIAGNOSIS — Z48.815 ENCOUNTER FOR SURGICAL AFTERCARE FOLLOWING SURGERY OF DIGESTIVE SYSTEM: ICD-10-CM

## 2023-01-28 DIAGNOSIS — C54.1 ENDOMETRIAL CANCER (HCC): Chronic | ICD-10-CM

## 2023-01-28 DIAGNOSIS — I10 BENIGN ESSENTIAL HYPERTENSION: ICD-10-CM

## 2023-01-28 DIAGNOSIS — N18.31 STAGE 3A CHRONIC KIDNEY DISEASE (HCC): ICD-10-CM

## 2023-01-28 DIAGNOSIS — K91.2 POSTSURGICAL MALABSORPTION: ICD-10-CM

## 2023-01-28 DIAGNOSIS — Z98.84 BARIATRIC SURGERY STATUS: ICD-10-CM

## 2023-01-28 DIAGNOSIS — Z92.21 HISTORY OF CHEMOTHERAPY: ICD-10-CM

## 2023-01-28 LAB
25(OH)D3 SERPL-MCNC: 27.4 NG/ML (ref 30–100)
ALBUMIN SERPL BCP-MCNC: 3.5 G/DL (ref 3.5–5)
ALP SERPL-CCNC: 108 U/L (ref 34–104)
ALT SERPL W P-5'-P-CCNC: 8 U/L (ref 7–52)
ANION GAP SERPL CALCULATED.3IONS-SCNC: 6 MMOL/L (ref 4–13)
AST SERPL W P-5'-P-CCNC: 8 U/L (ref 13–39)
BACTERIA UR QL AUTO: ABNORMAL /HPF
BILIRUB SERPL-MCNC: 0.23 MG/DL (ref 0.2–1)
BILIRUB UR QL STRIP: NEGATIVE
BUN SERPL-MCNC: 29 MG/DL (ref 5–25)
CALCIUM SERPL-MCNC: 8.8 MG/DL (ref 8.4–10.2)
CHLORIDE SERPL-SCNC: 109 MMOL/L (ref 96–108)
CHOLEST SERPL-MCNC: 184 MG/DL
CLARITY UR: ABNORMAL
CO2 SERPL-SCNC: 23 MMOL/L (ref 21–32)
COLOR UR: ABNORMAL
CREAT SERPL-MCNC: 1.12 MG/DL (ref 0.6–1.3)
ERYTHROCYTE [DISTWIDTH] IN BLOOD BY AUTOMATED COUNT: 13.9 % (ref 11.6–15.1)
FERRITIN SERPL-MCNC: 467 NG/ML (ref 8–388)
GFR SERPL CREATININE-BSD FRML MDRD: 51 ML/MIN/1.73SQ M
GLUCOSE P FAST SERPL-MCNC: 86 MG/DL (ref 65–99)
GLUCOSE UR STRIP-MCNC: NEGATIVE MG/DL
HCT VFR BLD AUTO: 30.7 % (ref 34.8–46.1)
HDLC SERPL-MCNC: 64 MG/DL
HGB BLD-MCNC: 9.4 G/DL (ref 11.5–15.4)
HGB UR QL STRIP.AUTO: ABNORMAL
IRON SATN MFR SERPL: 14 % (ref 15–50)
IRON SERPL-MCNC: 31 UG/DL (ref 50–170)
KETONES UR STRIP-MCNC: NEGATIVE MG/DL
LDLC SERPL CALC-MCNC: 104 MG/DL (ref 0–100)
LEUKOCYTE ESTERASE UR QL STRIP: ABNORMAL
MCH RBC QN AUTO: 29.7 PG (ref 26.8–34.3)
MCHC RBC AUTO-ENTMCNC: 30.6 G/DL (ref 31.4–37.4)
MCV RBC AUTO: 97 FL (ref 82–98)
NITRITE UR QL STRIP: NEGATIVE
NON-SQ EPI CELLS URNS QL MICRO: ABNORMAL /HPF
NONHDLC SERPL-MCNC: 120 MG/DL
PH UR STRIP.AUTO: 6 [PH]
PHOSPHATE SERPL-MCNC: 4.2 MG/DL (ref 2.3–4.1)
PLATELET # BLD AUTO: 309 THOUSANDS/UL (ref 149–390)
PMV BLD AUTO: 9.6 FL (ref 8.9–12.7)
POTASSIUM SERPL-SCNC: 4.3 MMOL/L (ref 3.5–5.3)
PROT SERPL-MCNC: 6.8 G/DL (ref 6.4–8.4)
PROT UR STRIP-MCNC: ABNORMAL MG/DL
PTH-INTACT SERPL-MCNC: 115.6 PG/ML (ref 18.4–80.1)
RBC # BLD AUTO: 3.17 MILLION/UL (ref 3.81–5.12)
RBC #/AREA URNS AUTO: ABNORMAL /HPF
SODIUM SERPL-SCNC: 138 MMOL/L (ref 135–147)
SP GR UR STRIP.AUTO: 1.01 (ref 1–1.03)
TIBC SERPL-MCNC: 224 UG/DL (ref 250–450)
TRIGL SERPL-MCNC: 82 MG/DL
UROBILINOGEN UR STRIP-ACNC: <2 MG/DL
VIT B12 SERPL-MCNC: 411 PG/ML (ref 100–900)
WBC # BLD AUTO: 5.41 THOUSAND/UL (ref 4.31–10.16)
WBC #/AREA URNS AUTO: ABNORMAL /HPF
WBC CLUMPS # UR AUTO: PRESENT /UL

## 2023-01-29 LAB — BACTERIA UR CULT: NORMAL

## 2023-01-30 ENCOUNTER — OFFICE VISIT (OUTPATIENT)
Dept: PALLIATIVE MEDICINE | Facility: CLINIC | Age: 68
End: 2023-01-30

## 2023-01-30 VITALS
OXYGEN SATURATION: 95 % | SYSTOLIC BLOOD PRESSURE: 100 MMHG | DIASTOLIC BLOOD PRESSURE: 62 MMHG | WEIGHT: 121.69 LBS | HEART RATE: 82 BPM | HEIGHT: 59 IN | TEMPERATURE: 97.3 F | BODY MASS INDEX: 24.53 KG/M2

## 2023-01-30 DIAGNOSIS — E44.0 MODERATE PROTEIN-CALORIE MALNUTRITION (HCC): ICD-10-CM

## 2023-01-30 DIAGNOSIS — C54.1 ENDOMETRIAL CANCER (HCC): Chronic | ICD-10-CM

## 2023-01-30 DIAGNOSIS — F11.20 CONTINUOUS OPIOID DEPENDENCE (HCC): ICD-10-CM

## 2023-01-30 DIAGNOSIS — C79.89 SECONDARY MALIGNANT NEOPLASM OF OTHER SPECIFIED SITES (HCC): ICD-10-CM

## 2023-01-30 DIAGNOSIS — G89.3 CANCER RELATED PAIN: ICD-10-CM

## 2023-01-30 DIAGNOSIS — K59.03 DRUG INDUCED CONSTIPATION: Primary | ICD-10-CM

## 2023-01-30 NOTE — PROGRESS NOTES
Outpatient Follow-Up - Palliative and Supportive Care   Ragini Melendez 74 y o  female [id number]    Assessment & Plan  Problem List Items Addressed This Visit        Digestive    Drug induced constipation - Primary       Genitourinary    Endometrial cancer ([facility] [address] ) (Chronic)       Other    Cancer related pain    Moderate protein-calorie malnutrition ([facility] [address] )    Secondary malignant neoplasm of other specified sites [facility])    Continuous opioid dependence ([facility] )       Medications adjusted this encounter:  Requested Prescriptions      No prescriptions requested or ordered in this encounter     No orders of the defined types were placed in this encounter  There are no discontinued medications  Thomas Whitt was seen today for symptoms and planning cares related to above illnesses  I have reviewed the patient's controlled substance dispensing history in the Prescription Drug Monitoring Program in compliance with the [facility] regulations before prescribing any controlled substances  They are invited to continue to follow with us  If there are questions or concerns, please contact us through our clinic/answering service 24 hours a day, seven days a week  April RAFITA Hubbard  [facility]        Visit Information    Accompanied By: No one     Source of History: Self    History Limitations: None    History of Present Illness      Zane Melendez is a 74 y o  female who presents in follow up of symptoms related to endometrial cancer    Pertinent issues include: symptom management, nausea, advance care planning    Per last PCM Note: The patient restarted her Rucaparib at the beginning of August and was receiving treatment per the [facility] trial   Treatments were being held in setting of significant mucositis, weight loss and elevated UPC   She was last seen by gyn onc on 10/21   She had a significant improvement in symptoms; fatigue, mucocitis, appetite while treatments were held      Overall she states she is feeling well   Her appetite has improves, she sleeps well at night and has not been experiencing pain   She reports mucocitis has resolved   She reports that she has mouth rinse that was recommended for when she restarts treatments        She follows with Dr Govind Still and Ricardo Hendrix of gyn/onc  She recently completed the EndoBARR trial on 12/5/22 after 35 cycles  She recently established care with a new PCP  The patient reports overall feeling well and being cancer free  She reports no pain, eating well, sleeping well  She has follow up scheduled with gyn onc  She denies any symptoms  She is completing physical t herapy for her arm  Overall she is doing well  At this time, she does not need further palliative care follow up  Past medical, surgical, social, and family histories are reviewed and pertinent updates are made  Review of Systems   All other systems reviewed and are negative  Vital Signs    /62 (BP Location: Left arm, Patient Position: Sitting, Cuff Size: Standard)   Pulse 82   Temp (!) 97 3 °F (36 3 °C) (Temporal)   Ht 4' 10 5" (1 486 m)   Wt 55 2 kg (121 lb 11 1 oz)   LMP  (LMP Unknown)   SpO2 95%   BMI 25 00 kg/m²     Physical Exam and Objective Data  Physical Exam  Vitals and nursing note reviewed  Constitutional:       General: She is not in acute distress  Appearance: She is well-developed  HENT:      Head: Normocephalic and atraumatic  Nose: Nose normal       Mouth/Throat:      Mouth: Mucous membranes are moist    Eyes:      Conjunctiva/sclera: Conjunctivae normal    Cardiovascular:      Rate and Rhythm: Normal rate and regular rhythm  Heart sounds: No murmur heard  Pulmonary:      Effort: Pulmonary effort is normal  No respiratory distress  Breath sounds: Normal breath sounds  Abdominal:      General: Abdomen is flat  Palpations: Abdomen is soft  Tenderness:  There is no abdominal tenderness  Musculoskeletal:         General: No swelling  Normal range of motion  Cervical back: Neck supple  Skin:     General: Skin is warm and dry  Capillary Refill: Capillary refill takes less than 2 seconds  Neurological:      General: No focal deficit present  Mental Status: She is alert  She is disoriented  Psychiatric:         Mood and Affect: Mood normal          Behavior: Behavior normal            25+  minutes was spent face to face with Ragini Melendez with greater than 50% of the time spent in counseling or coordination of care including discussions of etiology of diagnosis, pathogenesis of diagnosis, instructions for disease self management, follow up requirements, compliance with treatment regimen and support in serious illness  All of the patient's or agent's questions were answered during this discussion

## 2023-01-31 ENCOUNTER — OFFICE VISIT (OUTPATIENT)
Dept: PHYSICAL THERAPY | Facility: CLINIC | Age: 68
End: 2023-01-31

## 2023-01-31 DIAGNOSIS — S42.295D OTHER CLOSED NONDISPLACED FRACTURE OF PROXIMAL END OF LEFT HUMERUS WITH ROUTINE HEALING, SUBSEQUENT ENCOUNTER: Primary | ICD-10-CM

## 2023-01-31 LAB
VIT B1 BLD-SCNC: 85.4 NMOL/L (ref 66.5–200)
ZINC SERPL-MCNC: 52 UG/DL (ref 44–115)

## 2023-01-31 NOTE — PROGRESS NOTES
Daily Note     Today's date: 2023  Patient name: Grant Tomlinson  : 1955  MRN: [id number]  Referring provider: Austin Mclain DO  Dx:   Encounter Diagnosis     ICD-10-CM    1  Other closed nondisplaced fracture of proximal end of left humerus with routine healing, subsequent encounter  [id number]           Start Time: 0900  Stop Time: 0945  Total time in clinic (min): 45 minutes    Subjective: Patient reports no changes in pain on arrival  She is in agreement for discharge next visit  She reports she ordered pulleys for home  Objective: See treatment diary below      Assessment: Tolerated treatment well  Initiated session with UBE and shoulder pulleys with good tolerance and no increase in pain  Improved tolerance and steadily improving mobility with PROM in all planes  Foam roller shoulder flexion improving mobility noted  Patient noted to have some soreness and difficulty with shoulder taps with 2# weight this session and was only able to perform 5 reps  Symptoms/pain relieved with rest break and declined cold pack end of session  Patient exhibited good technique with therapeutic exercises      Plan: Potential discharge next visit       Diagnosis: L proximal humerus fracture (DOI 2022)  Precautions: HTN, DM, hx of chemo, recent nephro stent (2022)  ( * astericks indicates given per HEP)    Manuals 1/10 1/12 1/17 1/19 1/24 1/26 1/31   STM DK DK DK DK KB  DK   IASTM          PROM DK DK DK DK KB CMF DK             Neuro Re-Ed          Mid Rows webslide     GTB 2x10 GTB 2x10    Shoulder Ext webslide     GTB 2x10 B/L GTB 2x10    Web Slide IR/ER 2x10 GTBea 2x10 BTBea  2x10 BTBea 2x10 BTBea BTB 2x15 each 2x10 BTBea   Web Slide shoulder Flex    GTB x15 GTB x15 GTB 2x10 GTB 2x10             Ther Ex          physioball rollouts seated          Table slides flex and ABD (standing)          AAROM ER with cane Seated 2x10 Seated 2x10 Seated 2x10 Seated 2x10 Seated 2x10 Seated 2x10 Seated 2x10   AAROM flexion with cane supine 2x10, stand x10 supine 2x10, stand x10 supine 2x10, stand x10 supine 2x10, stand x10 supine 2x10, stand x10 Seated 2x10 supine 2x10, stand x10   AAROM flexion cane with overpressure Standing x10 Standing x10 Standing x10       AAROM ABD with cane Standing 2x10 Standing 2x10 Standing 2x10 Standing 2x10 Standing 2x10 Standing 2x10 Standing 2x10   Wall slides with pillow case          Wall slides with foam roller Flex 2x10 Flex 2x10 Flex 2x10 NV Flex 2x10  Flex 2x10   Pulleys Seated flex + scap 5s, 2x10 Seated flex + scap 5s, 2x10 Seated flex + scap 5s, 2x10 Seated flex + scap 5s, 2x10 Seated flex + scap 5s, 2x10 Seated flex and scap :05 2x10 Seated flex and scap :05 2x10   IR stretch with strap                    Ther Activity          UBE for improving UE mobility and posture @ start FWD x2', BWD x1' FWD x2', BWD x1' FWD x2', BWD x1' FWD x2', BWD x1' FWD x2', BWD x1' FWD x2', BWD x1' FWD x2', BWD x1'   Shoulder Shelf taps Flexion #1 x15 #2 x10,  #1 x10 #2 x15 #2 x15 #2 x10 #2 x10 #2 x5   Pt Ed          Re-Evaluation   DK       Modalities          Heat/ice (prn)

## 2023-02-01 ENCOUNTER — TELEMEDICINE (OUTPATIENT)
Dept: FAMILY MEDICINE CLINIC | Facility: CLINIC | Age: 68
End: 2023-02-01

## 2023-02-01 VITALS — BODY MASS INDEX: 24.49 KG/M2 | WEIGHT: 121.5 LBS | HEIGHT: 59 IN

## 2023-02-01 DIAGNOSIS — R79.0 ABNORMAL IRON SATURATION: ICD-10-CM

## 2023-02-01 DIAGNOSIS — D70.1 CHEMOTHERAPY INDUCED NEUTROPENIA (HCC): ICD-10-CM

## 2023-02-01 DIAGNOSIS — I10 BENIGN ESSENTIAL HYPERTENSION: Primary | ICD-10-CM

## 2023-02-01 DIAGNOSIS — M80.00XS OSTEOPOROSIS WITH CURRENT PATHOLOGICAL FRACTURE, UNSPECIFIED OSTEOPOROSIS TYPE, SEQUELA: ICD-10-CM

## 2023-02-01 DIAGNOSIS — E78.5 DYSLIPIDEMIA: ICD-10-CM

## 2023-02-01 DIAGNOSIS — T45.1X5A CHEMOTHERAPY INDUCED NEUTROPENIA (HCC): ICD-10-CM

## 2023-02-01 DIAGNOSIS — D50.8 OTHER IRON DEFICIENCY ANEMIA: ICD-10-CM

## 2023-02-01 DIAGNOSIS — D64.89 DRUG-INDUCED ANEMIA: ICD-10-CM

## 2023-02-01 DIAGNOSIS — E55.9 VITAMIN D INSUFFICIENCY: ICD-10-CM

## 2023-02-01 DIAGNOSIS — N18.31 CHRONIC KIDNEY DISEASE, STAGE 3A (HCC): ICD-10-CM

## 2023-02-01 PROBLEM — Z85.42 ENCOUNTER FOR FOLLOW-UP SURVEILLANCE OF ENDOMETRIAL CANCER: Status: ACTIVE | Noted: 2019-03-17

## 2023-02-01 RX ORDER — MELATONIN
4000 DAILY
Qty: 90 TABLET | Refills: 0 | Status: SHIPPED | OUTPATIENT
Start: 2023-02-01

## 2023-02-01 NOTE — ASSESSMENT & PLAN NOTE
19-year-old with stage IB endometrial cancer in remission presents for follow up  I have extensively reviewed patients history  Her recurrent pathology showed a high grade lesion which is different than her initial path from endometrial cancer  We discussed that this may be ar esult of mutation from low grade vs a second primary that was unknown origin at that time  She also has family h/o ovarian cancer in her mother and breast cancer in her sister (BRCA negative per report)  I discussed obtaining genetic testing for further assistance in the event of recurrence  We also discussed if she were to recur obtaining another biopsy  We will repeat CT in 3 months given her persistent enlarged LN to ensure SABAS  RTC at that time

## 2023-02-01 NOTE — PROGRESS NOTES
Assessment/Plan:    Problem List Items Addressed This Visit        Genitourinary    Overactive bladder     Followed by urology  Starting pelvic PT             Other    Encounter for follow-up surveillance of endometrial cancer - Primary     61-year-old with stage IB endometrial cancer in remission presents for follow up  I have extensively reviewed patients history  Her recurrent pathology showed a high grade lesion which is different than her initial path from endometrial cancer  We discussed that this may be ar esult of mutation from low grade vs a second primary that was unknown origin at that time  She also has family h/o ovarian cancer in her mother and breast cancer in her sister (BRCA negative per report)  I discussed obtaining genetic testing for further assistance in the event of recurrence  We also discussed if she were to recur obtaining another biopsy  We will repeat CT in 3 months given her persistent enlarged LN to ensure SABAS  RTC at that time  Relevant Orders    Ambulatory Referral to Oncology Genetics    CT chest abdomen pelvis w contrast         CHIEF COMPLAINT: follow up       Problem:   Cancer Staging   Endometrial cancer [facility])  Staging form: Corpus Uteri - Carcinoma, AJCC 7th Edition  - Clinical stage from 12/19/2017: FIGO Stage IB (T1b, N0, M0) - Signed by Rosy Jones MD on 2/12/2018        Previous therapy:  Oncology History   Endometrial cancer ([facility] [address] )   11/17/2017 Initial Diagnosis    Endometrial cancer ([facility] [address] )     11/17/2017 Biopsy    ENDOMETRIAL BIOPSY: WELL DIFFERENTIATED ENDOMETRIAL ADENOCARCINOMA (FIGO I) WITH FOCALMUCINOUS FEATURES  Part B: ENDOCERVICAL POLYP:BENIGN ENDOCERVICAL      12/19/2017 Surgery    Robotic assisted total laparoscopic hysterectomy with bilateral salpingo-oophorectomy and sentinel bilateral pelvic lymph node dissection   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 (4 4 x 3 2 cm tumor, 9 4/15 4mm invasion, NO LVSI, washings revealed atypical cellular changes)     12/19/2017 Genetic Testing    Morrison testing negative     6/28/2019 Biopsy    A  Breast, Right, US BX Right Breast 1000 4 cmfn:  - Benign breast tissue with focal histiocytic aggregate  See comment   - Negative for atypia and in-situ or invasive carcinoma  7/8/2019 Recurrence    Presented with right lower extremity DVT and CT demonstrating right pelvic sidewall mass with venous, ureteral and nerve compression causing significant neuropathic pain  Biopsy:  Lymph Node, Right pelvic lymph node x3:  - High-grade adenocarcinoma  7/30/2019 - 1/6/2020 Chemotherapy    Taxol 175 mg/m2 and carboplatin AUC 6 every 21 days  Dose was reduced to taxol 135 mg/m2 and carboplatin AUC 5  Completed 6 cycles  Treatment protracted due to multiple hospital admissions  2/24/2020 - 4/13/2020 Radiation    adjuvant external beam radiation therapy to the whole pelvis to 4500 cGy followed by boost to gross disease of an additional 2200 cGy     11/23/2020 Progression    New necrotic adenopathy in the retroperitoneum on CT      12/21/2020 -  Chemotherapy    Began chemotherapy with rucaparib, atezolizumab, and bevacizumab as per [facility] clinical trial     Rucaparib held cycle 28 secondary to fatigue  Avastin held since cycle 30 for elevated UPC  Treatment held for one cycle after cycle 31 for mucositis/fatigue             Patient ID: Joni Londono is a 74 y o  female  22-year-old with stage IB endometrial cancer in remission presents for follow up  Pt was most recently on EndoBARR and decided to discontinue for worsening symptoms  Pt repots ongoing incontinence issues [facility]  She started pelvic PT this weel  She has ongoing dry mouth that is interferring with eating mostly in the morning  No bloating  No changes in BMs  No pelvic pain  No bleeding     Last imaging 12/19/2022 with stable slightly enlarged LNs         The following portions of the patient's history were reviewed and updated as appropriate: allergies, current medications, past family history, past medical history, past social history, past surgical history and problem list     Review of Systems   Constitutional: Negative for appetite change, chills, fatigue and fever  HENT: Positive for trouble swallowing  Respiratory: Negative for chest tightness and shortness of breath  Gastrointestinal: Negative for abdominal distention, abdominal pain, constipation, diarrhea and nausea  Genitourinary: Positive for frequency  Negative for difficulty urinating, flank pain, urgency, vaginal bleeding, vaginal discharge and vaginal pain  Musculoskeletal: Negative for back pain, joint swelling and myalgias  Skin: Negative for rash  Neurological: Negative for dizziness, light-headedness, numbness and headaches  Current Outpatient Medications   Medication Sig Dispense Refill   • ARIPiprazole (ABILIFY) 20 MG tablet Take 20 mg by mouth in the morning       • aspirin (ECOTRIN LOW STRENGTH) 81 mg EC tablet Take 81 mg by mouth daily     • Calcium-Magnesium-Vitamin D (CALCIUM 500 PO) Take by mouth daily      • cholecalciferol (VITAMIN D3) 1,000 units tablet Take 4 tablets (4,000 Units total) by mouth daily 90 tablet 0   • Cranberry-Vitamin C 250-60 MG CAPS Take 1 tablet by mouth in the morning 90 capsule 3   • Cyanocobalamin (VITAMIN B12 PO) Take by mouth daily      • dronabinol (MARINOL) 2 5 mg capsule Take 1 capsule (2 5 mg total) by mouth 2 (two) times a day before meals 30 capsule 0   • enoxaparin (LOVENOX) 80 mg/0 8 mL INJECT 0 8ML (80MG TOTAL)  SUBCUTANEOUSLY EVERY 24  HOURS 72 mL 1   • folic acid (FOLVITE) 1 mg tablet Take 1 tablet (1 mg total) by mouth daily  0   • Gemtesa 75 MG TABS TAKE 75 MG BY MOUTH IN THE MORNING 90 tablet 2   • lisinopril (ZESTRIL) 5 mg tablet Take 1 tablet (5 mg total) by mouth daily 90 tablet 3   • LORazepam (ATIVAN) 0 5 mg tablet Take 1 tablet (0 5 mg total) by mouth every 6 (six) hours as needed for anxiety (or nausea) 36 tablet 1   • Multiple Vitamin (MULTIVITAMIN) tablet Take 1 tablet by mouth daily     • naloxone (NARCAN) 4 mg/0 1 mL nasal spray Administer 1 spray into a nostril  If no response after 2-3 minutes, give another dose in the other nostril using a new spray  1 each 0   • omeprazole (PriLOSEC) 20 mg delayed release capsule Take 20 mg by mouth daily     • ondansetron (ZOFRAN) 8 mg tablet Take 1 tablet (8 mg total) by mouth every 8 (eight) hours as needed for nausea or vomiting 30 tablet 1   • oxybutynin (DITROPAN XL) 15 MG 24 hr tablet Take 1 tablet (15 mg total) by mouth daily at bedtime 90 tablet 3   • rucaparib (RUBRACA) 300 mg tablet Take 600 mg by mouth every 12 (twelve) hours Take with or without food  Do not repeat a vomited dose  • saccharomyces boulardii (FLORASTOR) 250 mg capsule Take 1 capsule (250 mg total) by mouth 2 (two) times a day  0   • senna (SENOKOT) 8 6 MG tablet Take 1 tablet (8 6 mg total) by mouth 2 (two) times a day as needed for constipation 60 tablet 0   • venlafaxine (EFFEXOR) 75 mg tablet Take 75 mg by mouth 3 (three) times a day       • amoxicillin (AMOXIL) 500 mg capsule  (Patient not taking: Reported on 1/13/2023)     • BD PosiFlush 0 9 % SOLN  (Patient not taking: Reported on 1/13/2023)     • clotrimazole-betamethasone (LOTRISONE) 1-0 05 % cream Apply topically 2 (two) times a day (Patient not taking: Reported on 1/13/2023) 30 g 0   • ergocalciferol (VITAMIN D2) 50,000 units Take 1 capsule (50,000 Units total) by mouth once a week for 6 doses (Patient not taking: Reported on 11/29/2022) 6 capsule 0   • estradiol (ESTRACE VAGINAL) 0 1 mg/g vaginal cream Insert 1g nightly for 6 weeks, then 1-2x weekly   (Patient not taking: Reported on 1/20/2023) 42 5 g 1   • methylprednisolone (MEDROL) 4 mg tablet Take 1 tablet (4 mg total) by mouth in the morning Take 2 tables once a day for 5 days, take 1 tablet once a day for 5 days, take 1 tablet every other day for 14 days  (Patient not taking: Reported on 1/20/2023) 21 tablet 0   • sodium chloride, PF, 0 9 % 10 mL by Intracatheter route daily (Patient not taking: Reported on 1/13/2023) 300 mL 3     No current facility-administered medications for this visit  Objective:    Blood pressure 98/58, pulse 84, temperature 98 8 °F (37 1 °C), temperature source Temporal, resp  rate 16, height 4' 10 5" (1 486 m), weight 53 8 kg (118 lb 8 oz), SpO2 97 %, not currently breastfeeding  Body mass index is 24 34 kg/m²  Body surface area is 1 47 meters squared  Physical Exam  HENT:      Head: Normocephalic and atraumatic  Nose: Nose normal    Cardiovascular:      Rate and Rhythm: Normal rate and regular rhythm  Pulmonary:      Effort: Pulmonary effort is normal    Abdominal:      General: There is no distension  Palpations: Abdomen is soft  There is no mass  Genitourinary:     Comments: defer  Musculoskeletal:         General: No swelling  Normal range of motion  Cervical back: Normal range of motion  Skin:     General: Skin is warm and dry  Neurological:      General: No focal deficit present  Mental Status: She is alert     Psychiatric:         Mood and Affect: Mood normal            Lab Results   Component Value Date     10/27/2017    K 4 3 01/28/2023     (H) 01/28/2023    CO2 23 01/28/2023    BUN 29 (H) 01/28/2023    CREATININE 1 12 01/28/2023    GLUCOSE 219 (H) 12/19/2017    GLUF 86 01/28/2023    CALCIUM 8 8 01/28/2023    CORRECTEDCA 10 5 (H) 11/11/2022    AST 8 (L) 01/28/2023    ALT 8 01/28/2023    ALKPHOS 108 (H) 01/28/2023    PROT 6 9 10/27/2017    BILITOT 0 3 10/27/2017    EGFR 51 01/28/2023     Lab Results   Component Value Date    WBC 5 41 01/28/2023    HGB 9 4 (L) 01/28/2023    HCT 30 7 (L) 01/28/2023    MCV 97 01/28/2023     01/28/2023     Lab Results   Component Value Date    NEUTROABS 5 11 12/30/2022        Trend:  No results found for:

## 2023-02-01 NOTE — PROGRESS NOTES
Virtual Regular Visit    Verification of patient location:  Patient is located in the following state in which I hold an active license [place]    Assessment/Plan:  Problem List Items Addressed This Visit        Cardiovascular and Mediastinum    Benign essential hypertension - Primary  - follows with Nephro   - on ACEI 5mg QD for hernando-protection   - noted to have lower pressure readings at home - would recommend taking 1/2 dose but advised to consult her Nephrologist prior to making any changes        Musculoskeletal and Integument    Osteoporosis       Genitourinary    Chronic kidney disease, stage 3a ([address] )       Other    Dyslipidemia  - Lipids (1/28/2023): , TG 82, HDL 64,   - advised to STOP Gemfibrozil       Chemotherapy induced neutropenia ([address] )    Anemia    Relevant Orders    Ambulatory Referral to Hematology / Oncology      Drug-induced anemia   Other Visit Diagnoses     Abnormal iron saturation        Relevant Orders    Ambulatory Referral to Hematology / Oncology  - CBC (1/28/2023): H/H 9 4/30 7  - serum Fe 31  - Fe sat 14%   - Ferritin 467   - pt with h/o drug induced anemia and chemo induced neutropenia  - takes OTC Fe QD   - advised to f/u with Heme/Onc - could benefit from IV Fe infusion - pt aware and agreeable      Vitamin D insufficiency        Relevant Medications    cholecalciferol (VITAMIN D3) 1,000 units tablet  - (+) h/o Osteoporosis  - advised to take OTC Vit D 4000IU QD              Reason for visit is   Chief Complaint   Patient presents with   • Follow-up     Review labs   • Virtual Regular Visit        Encounter provider Liset Eller DO  Provider located at [address] [place] [phone]      Recent Visits  No visits were found meeting these conditions    Showing recent visits within past 7 days and meeting all other requirements  Today's Visits  Date Type Provider Dept   02/01/23 Telemedicine Emely Yost DO [facility]   Showing today's visits and meeting all other requirements  Future Appointments  No visits were found meeting these conditions  Showing future appointments within next 150 days and meeting all other requirements       The patient was identified by name and date of birth  Luca Pelaez was informed that this is a telemedicine visit and that the visit is being conducted through the [address] [employer] platform  She agrees to proceed     My office door was closed  No one else was in the room  She acknowledged consent and understanding of privacy and security of the video platform  The patient has agreed to participate and understands they can discontinue the visit at any time  Patient is aware this is a billable service  Yaquelin Moreira is a 74 y o  female who presents virtually for eval of labs      HPI   - reviewed labs done 1/28/2023  - denies F/C/N/V/CP/palpitations/SOB/wheezing/abd pain  - noted to have lower BP reads at home - but denies lightheadedness or dizziness when getting up from seated position       Past Medical History:   Diagnosis Date   • Anemia    • Chemotherapy follow-up examination    • Depression    • Endometrial cancer ([facility] ) 12/2017   • History of chemotherapy     started 12/2021endometrial cancer   • Hyperglycemia     vx type 2 dm -- last assessed 4/1/14; resolved 11/7/17   • Hyperlipidemia    • Hypertension    • Obesity     last assessed 10/14/17; resolved 11/7/17       Past Surgical History:   Procedure Laterality Date   • ABDOMINAL SURGERY      GASTRIC BYPASS   • BREAST BIOPSY Right 06/28/2019    core biopsy; benign   • CHOLECYSTECTOMY      at the time of gastric bypass   • COLONOSCOPY     • CT NEEDLE BIOPSY LYMPH NODE  07/08/2019   • FL GUIDED CENTRAL VENOUS ACCESS DEVICE INSERTION  11/12/2019   • GASTRIC BYPASS     • HYSTERECTOMY Bilateral 2017    total abdominal with salpingo-oophorectomy   • IR NEPHROSTOMY TO NEPHROURETERAL STENT  06/09/2022   • IR NEPHROSTOMY TO NEPHROURETERAL STENT  12/20/2022   • IR NEPHROSTOMY TUBE CHECK/CHANGE/REPOSITION/REINSERTION/UPSIZE  05/27/2022   • IR NEPHROSTOMY TUBE PLACEMENT  07/26/2019   • IR NEPHROURETERAL STENT CHECK/CHANGE/REPOSITION  04/06/2021   • IR NEPHROURETERAL STENT CHECK/CHANGE/REPOSITION  06/04/2021   • IR NEPHROURETERAL STENT CHECK/CHANGE/REPOSITION  09/03/2021   • IR NEPHROURETERAL STENT CHECK/CHANGE/REPOSITION  12/07/2021   • IR NEPHROURETERAL STENT CHECK/CHANGE/REPOSITION  03/08/2022   • IR NEPHROURETERAL STENT CHECK/CHANGE/REPOSITION  05/10/2022   • IR NEPHROURETERAL STENT CHECK/CHANGE/REPOSITION  9/19/2022   • IR PICC LINE  09/27/2019   • IR PORT PLACEMENT  07/26/2019   • IR PORT REMOVAL  09/20/2019   • OOPHORECTOMY Bilateral 2017   • HI INSJ TUNNELED CTR VAD W/SUBQ PORT AGE 5 YR/> Left 11/12/2019    Procedure: INSERTION VENOUS PORT ( PORT-A-CATH) IR;  Surgeon: Mickie Ochoa DO;  Location[facility];  Service: Interventional Radiology   • HI LAPS ABD PRTM&OMENTUM DX W/WO SPEC BR/WA SPX N/A 12/19/2017    Procedure: LAPAROSCOPY DIAGNOSTIC;  Surgeon: Drake Frazier MD;  Location: [facility];  Service: Gynecology Oncology   • HI LAPS W/RAD HYST W/BILAT LMPHADEC RMVL TUBE/OVARY N/A 12/19/2017    Procedure: HYSTERECTOMY LAPAROSCOPIC TOTAL ([address]) W/ ROBOTICS; BILATERAL SALPINGOOPHERECTOMY; LYMPH NODE DISSECTION; lysis of adhesions;  Surgeon: Drake Frazier MD;  Location: [facility];  Service: Gynecology Oncology   • TONSILLECTOMY     • US GUIDED BREAST BIOPSY RIGHT COMPLETE Right 06/28/2019       Current Outpatient Medications   Medication Sig Dispense Refill   • ARIPiprazole (ABILIFY) 20 MG tablet Take 20 mg by mouth in the morning       • aspirin (ECOTRIN LOW STRENGTH) 81 mg EC tablet Take 81 mg by mouth daily     • Calcium-Magnesium-Vitamin D (CALCIUM 500 PO) Take by mouth daily      • cholecalciferol (VITAMIN D3) 1,000 units tablet Take 4 tablets (4,000 Units total) by mouth daily 90 tablet 0   • Cranberry-Vitamin C 250-60 MG CAPS Take 1 tablet by mouth in the morning 90 capsule 3   • dronabinol (MARINOL) 2 5 mg capsule Take 1 capsule (2 5 mg total) by mouth 2 (two) times a day before meals 30 capsule 0   • enoxaparin (LOVENOX) 80 mg/0 8 mL INJECT 0 8ML (80MG TOTAL)  SUBCUTANEOUSLY EVERY 24  HOURS 72 mL 1   • folic acid (FOLVITE) 1 mg tablet Take 1 tablet (1 mg total) by mouth daily  0   • Gemtesa 75 MG TABS TAKE 75 MG BY MOUTH IN THE MORNING 90 tablet 2   • LORazepam (ATIVAN) 0 5 mg tablet Take 1 tablet (0 5 mg total) by mouth every 6 (six) hours as needed for anxiety (or nausea) 36 tablet 1   • Multiple Vitamin (MULTIVITAMIN) tablet Take 1 tablet by mouth daily     • naloxone (NARCAN) 4 mg/0 1 mL nasal spray Administer 1 spray into a nostril  If no response after 2-3 minutes, give another dose in the other nostril using a new spray   1 each 0   • omeprazole (PriLOSEC) 20 mg delayed release capsule Take 20 mg by mouth daily     • ondansetron (ZOFRAN) 8 mg tablet Take 1 tablet (8 mg total) by mouth every 8 (eight) hours as needed for nausea or vomiting 30 tablet 1   • oxybutynin (DITROPAN XL) 15 MG 24 hr tablet Take 1 tablet (15 mg total) by mouth daily at bedtime 90 tablet 3   • saccharomyces boulardii (FLORASTOR) 250 mg capsule Take 1 capsule (250 mg total) by mouth 2 (two) times a day  0   • senna (SENOKOT) 8 6 MG tablet Take 1 tablet (8 6 mg total) by mouth 2 (two) times a day as needed for constipation 60 tablet 0   • venlafaxine (EFFEXOR) 75 mg tablet Take 75 mg by mouth 3 (three) times a day       • amoxicillin (AMOXIL) 500 mg capsule  (Patient not taking: Reported on 1/13/2023)     • BD PosiFlush 0 9 % SOLN  (Patient not taking: Reported on 1/13/2023)     • clotrimazole-betamethasone (LOTRISONE) 1-0 05 % cream Apply topically 2 (two) times a day (Patient not taking: Reported on 1/13/2023) 30 g 0   • Cyanocobalamin (VITAMIN B12 PO) Take by mouth daily      • ergocalciferol (VITAMIN D2) 50,000 units Take 1 capsule (50,000 Units total) by mouth once a week for 6 doses (Patient not taking: Reported on 11/29/2022) 6 capsule 0   • estradiol (ESTRACE VAGINAL) 0 1 mg/g vaginal cream Insert 1g nightly for 6 weeks, then 1-2x weekly  (Patient not taking: Reported on 1/20/2023) 42 5 g 1   • lisinopril (ZESTRIL) 5 mg tablet Take 1 tablet (5 mg total) by mouth daily 90 tablet 3   • methylprednisolone (MEDROL) 4 mg tablet Take 1 tablet (4 mg total) by mouth in the morning Take 2 tables once a day for 5 days, take 1 tablet once a day for 5 days, take 1 tablet every other day for 14 days  (Patient not taking: Reported on 1/20/2023) 21 tablet 0   • rucaparib (RUBRACA) 300 mg tablet Take 600 mg by mouth every 12 (twelve) hours Take with or without food  Do not repeat a vomited dose  (Patient not taking: Reported on 1/6/2023)     • sodium chloride, PF, 0 9 % 10 mL by Intracatheter route daily (Patient not taking: Reported on 1/13/2023) 300 mL 3     No current facility-administered medications for this visit          Allergies   Allergen Reactions   • Cephalosporins Rash     Which Cephalosporin reaction was to not specified; however, has tolerated Amoxicillin, Cefazolin, and Cefepime       Review of Systems as per HPI    Video Exam  Vitals:    02/01/23 0936   Weight: 55 1 kg (121 lb 8 oz)   Height: 4' 10 5" (1 486 m)     Physical Exam   General: AAOx3, NAD  HEENT: NC/AT, EOMI, clear conjunctiva, nml external ear and nose   Cardio: deferred  Pulm: no acute respiratory distress, able to talk in complete sentences w/o getting short of breath   Abd: deferred   Psych: nml mood/affect/behavior       I spent 20 minutes directly with the patient during this visit

## 2023-02-02 ENCOUNTER — OFFICE VISIT (OUTPATIENT)
Dept: PHYSICAL THERAPY | Facility: CLINIC | Age: 68
End: 2023-02-02

## 2023-02-02 DIAGNOSIS — S42.295D OTHER CLOSED NONDISPLACED FRACTURE OF PROXIMAL END OF LEFT HUMERUS WITH ROUTINE HEALING, SUBSEQUENT ENCOUNTER: Primary | ICD-10-CM

## 2023-02-02 NOTE — PROGRESS NOTES
Discharge Summary     Today's date: 2023  Patient name: Isrrael Jerez  : 1955  MRN: [id number]  Referring provider: Erasmo Beltran DO  Dx:   Encounter Diagnosis     ICD-10-CM    1  Other closed nondisplaced fracture of proximal end of left humerus with routine healing, subsequent encounter  S42 295D           Start Time: 0900  Stop Time: 0945  Total time in clinic (min): 45 minutes    Subjective: Patient reports she has better function of her Left shoulder without much pain  Objective: See treatment diary below  Objective findings as per last reassessment, that are consistent today  Assessment: Tolerated treatment well  Patient noted to have improving mobility of Left shoulder, continuing to have firm end-feel with PROM  However, AROM and AAROM seems to improve flexibility and mobility of shoulder in all directions, with minimal ERP  Patient overall reports improved ability to perform ADLs, self-care activities, and other daily functional activities such as household chores and taking care of her mother with improved ability since she started PT  Patient exhibited good technique with therapeutic exercises  She will be discharged from PT this session to HEP, to continue with exercises at home for maintaining PT gains and proper carryover of PT interventions with daily household activities  Plan: Discharge from PT to HEP per patient and PT agreement       Diagnosis: L proximal humerus fracture (DOI 2022)  Precautions: HTN, DM, hx of chemo, recent nephro stent (2022)  ( * astericks indicates given per HEP)    Manuals 1/10 1/12 1/17 1/19 1/24 1/26 1/31 2/2   STM DK DK DK DK KB  DK DK   IASTM           PROM DK DK DK DK KB CMF DK DK              Neuro Re-Ed           Mid Rows webslide     GTB 2x10 GTB 2x10     Shoulder Ext webslide     GTB 2x10 B/L GTB 2x10     Web Slide IR/ER 2x10 GTBea 2x10 BTBea  2x10 BTBea 2x10 BTBea BTB 2x15 each 2x10 BTBea    Web Slide shoulder Flex    GTB x15 GTB x15 GTB 2x10 GTB 2x10    No Monies*        GTB x10              Ther Ex           physioball rollouts seated           Table slides flex and ABD (standing)           AAROM ER with cane* Seated 2x10 Seated 2x10 Seated 2x10 Seated 2x10 Seated 2x10 Seated 2x10 Seated 2x10 Seated 2x10   AAROM flexion with cane* supine 2x10, stand x10 supine 2x10, stand x10 supine 2x10, stand x10 supine 2x10, stand x10 supine 2x10, stand x10 Seated 2x10 supine 2x10, stand x10 supine 2x10, stand x10   AAROM flexion cane with overpressure Standing x10 Standing x10 Standing x10        AAROM ABD with cane* Standing 2x10 Standing 2x10 Standing 2x10 Standing 2x10 Standing 2x10 Standing 2x10 Standing 2x10 Standing 2x10   Wall slides with pillow case*           Wall slides with foam roller Flex 2x10 Flex 2x10 Flex 2x10 NV Flex 2x10  Flex 2x10 Flex 2x10   Pulleys* Seated flex + scap 5s, 2x10 Seated flex + scap 5s, 2x10 Seated flex + scap 5s, 2x10 Seated flex + scap 5s, 2x10 Seated flex + scap 5s, 2x10 Seated flex and scap :05 2x10 Seated flex and scap :05 2x10 Seated flex and scap :05 2x10   IR stretch with strap                      Ther Activity           UBE for improving UE mobility and posture @ start FWD x2', BWD x1' FWD x2', BWD x1' FWD x2', BWD x1' FWD x2', BWD x1' FWD x2', BWD x1' FWD x2', BWD x1' FWD x2', BWD x1' FWD x2', BWD x1'   Shoulder Shelf taps Flexion #1 x15 #2 x10,  #1 x10 #2 x15 #2 x15 #2 x10 #2 x10 #2 x5 #1 2x10   Pt Ed        HEP   Re-Evaluation   JENN     DK, D/C   Modalities           Heat/ice (prn)

## 2023-02-03 ENCOUNTER — DOCUMENTATION (OUTPATIENT)
Dept: OTHER | Facility: HOSPITAL | Age: 68
End: 2023-02-03

## 2023-02-03 ENCOUNTER — DOCUMENTATION (OUTPATIENT)
Dept: NUTRITION | Facility: CLINIC | Age: 68
End: 2023-02-03

## 2023-02-03 ENCOUNTER — OFFICE VISIT (OUTPATIENT)
Dept: GYNECOLOGIC ONCOLOGY | Facility: CLINIC | Age: 68
End: 2023-02-03

## 2023-02-03 VITALS
HEIGHT: 59 IN | TEMPERATURE: 98.8 F | WEIGHT: 118.5 LBS | DIASTOLIC BLOOD PRESSURE: 58 MMHG | BODY MASS INDEX: 23.89 KG/M2 | SYSTOLIC BLOOD PRESSURE: 98 MMHG | OXYGEN SATURATION: 97 % | RESPIRATION RATE: 16 BRPM | HEART RATE: 84 BPM

## 2023-02-03 DIAGNOSIS — N32.81 OVERACTIVE BLADDER: ICD-10-CM

## 2023-02-03 DIAGNOSIS — Z85.42 ENCOUNTER FOR FOLLOW-UP SURVEILLANCE OF ENDOMETRIAL CANCER: Primary | ICD-10-CM

## 2023-02-03 DIAGNOSIS — Z08 ENCOUNTER FOR FOLLOW-UP SURVEILLANCE OF ENDOMETRIAL CANCER: Primary | ICD-10-CM

## 2023-02-03 PROBLEM — R68.2 DRY MOUTH: Status: ACTIVE | Noted: 2023-02-03

## 2023-02-03 LAB — VIT A SERPL-MCNC: 11.9 UG/DL (ref 22–69.5)

## 2023-02-03 NOTE — ASSESSMENT & PLAN NOTE
May be secondary to medications  Discussed symptom management  Continue to monitor  Recommended supplemental nutrition given interference with eating

## 2023-02-03 NOTE — PROGRESS NOTES
Ragini Melendez (1955) is patient #[id number] on the [facility] clinical trial  Patient was seen for her 30 follow up with Tyrell Carmen  AE and con-meds were reviewed  Patient states no change to medication   She is still experiencing Dry mouth and mouth sores and states it might be caused from some of her other medication   Over-all patient states that she is doing well  Patient was informed that this would be her last follow-up visit for clinical trials ,[facility] study  All patients questions and concerns were answered  Tyrell Carmen discussed that she will see the patient back in march with a CT scan and from there they will discuss her follow-up schedule  Patient verbalized understanding of all the above

## 2023-02-06 ENCOUNTER — TELEPHONE (OUTPATIENT)
Dept: BARIATRICS | Facility: CLINIC | Age: 68
End: 2023-02-06

## 2023-02-06 ENCOUNTER — EVALUATION (OUTPATIENT)
Dept: PHYSICAL THERAPY | Facility: REHABILITATION | Age: 68
End: 2023-02-06

## 2023-02-06 ENCOUNTER — NUTRITION (OUTPATIENT)
Dept: NUTRITION | Facility: CLINIC | Age: 68
End: 2023-02-06

## 2023-02-06 VITALS — BODY MASS INDEX: 24.65 KG/M2 | WEIGHT: 120 LBS

## 2023-02-06 DIAGNOSIS — R32 INCONTINENCE IN FEMALE: ICD-10-CM

## 2023-02-06 DIAGNOSIS — K91.2 POSTSURGICAL MALABSORPTION: ICD-10-CM

## 2023-02-06 DIAGNOSIS — E21.3 HYPERPARATHYROIDISM (HCC): ICD-10-CM

## 2023-02-06 DIAGNOSIS — Z71.3 NUTRITIONAL COUNSELING: Primary | ICD-10-CM

## 2023-02-06 DIAGNOSIS — R79.89 LOW SERUM VITAMIN A: ICD-10-CM

## 2023-02-06 DIAGNOSIS — R39.15 URINARY URGENCY: Primary | ICD-10-CM

## 2023-02-06 DIAGNOSIS — Z98.84 BARIATRIC SURGERY STATUS: Primary | ICD-10-CM

## 2023-02-06 NOTE — TELEPHONE ENCOUNTER
Reached out to Pt to review labs  Pt stated she was unable to talk as she was in physical therapy appt  Copied results into [employer] message and encouraged her to contact office with questions or concerns  Pt verbalized understanding and was agreeable to plan

## 2023-02-06 NOTE — PROGRESS NOTES
PT Evaluation     Today's date: 2023  Patient name: Silvina Barron  : 1955  MRN: [id number]  Referring provider: RAFITA Bhardwaj  Dx:   Encounter Diagnosis     ICD-10-CM    1  Urinary urgency  R39 15       2  Incontinence in female  R32 Ambulatory Referral to Physical Therapy          Start Time: 1400  Stop Time: 1500  Total time in clinic (min): 60 minutes    Assessment  Assessment details: The patient is a 74 y o  female with complaints of urinary frequency/urgency, and incontinence  Her History includes endometrial cancer  She has undergone chemotherapy two times, most recently from 2021 to Dec 2022, as well as radiation  She also had surgery to place a stent in her right ureter  She reports that she developed weakness in her right LE after blood clots were found and the stent was placed  She also has constipation as well  Education and counseling provided today  Pelvic floor muscle examination will be performed next visit  She was given bladder logs to fill out and bring in to her next visit  She would benefit from pelvic floor physical therapy to help reduce/manage pain and symptoms, address impairments and maximize function and quality of life upon discharge  female will be given updated HEP throughout episode of care  Thank you for the referral     Therapeutic activities performed upon examination included education regarding pelvic floor anatomy, explanation of exam technique, explanation of exam findings and discussion of treatment plan as well as expectations of the patient to emphasize the importance of compliance and adherence to physical therapy visits                 Impairments: abnormal coordination, abnormal or restricted ROM, activity intolerance, impaired physical strength, lacks appropriate home exercise program, pain with function, poor posture  and poor body mechanics  Understanding of Dx/Px/POC: good  Plan  Patient would benefit from: skilled physical therapy  Planned modality interventions: biofeedback  Planned therapy interventions: activity modification, abdominal trunk stabilization, behavior modification, body mechanics training, breathing training, coordination, flexibility, functional ROM exercises, IADL retraining, manual therapy, neuromuscular re-education, patient education, postural training, self care, strengthening, therapeutic activities and therapeutic exercise  Frequency: 1x week  Duration in visits: 12  Duration in weeks: 12  Plan of Care beginning date: 2/6/2023  Plan of Care expiration date: 5/1/2023  Treatment plan discussed with: patient        PT Pelvic Floor Subjective:   History of Present Illness: The patient was diagnosed with endometrial cancer  She had a hysterectomy in 2017  She developed blood clots in 2019 and they discovered that the cancer had spread to the right ureter and kidney  She underwent a first round of chemotherapy in 2020 and then also radiation therapy  She notes no adverse side effects from the radiation but she continues to experience dry mouth, constipation, and lack of appetite from the chemotherapy  She was part of a study from Nov 2021 to Dec 2022  in which she took an oral chemo medication (daily) and then had an infusion once every three weeks and she responded well to this  She notes that she had a stent placed in her R Ureter in the summer of 2022  She will go in March to follow up with this  She had a PET scan in December and is scheduled to have another in March of this year  She notes that there was no evidence of cancer on her scan from December  She will follow up with the Oncologist in March and recently saw her last week  The patient reports that she was heavier before, she lost 100 lbs during her cancer treatments  She had a hard time holding her bladder when she was heavier  She was taking Myrbetriq and Oxybutynin for these symptoms   She started to experience Genital pain which was a side effect on the Myrbetriq and she stopped taking it and it has since subsided  She is now taking Gemtessa and Oxybutynin  This does not seem to be as effective  She notes that she experiences urgency, frequency, and incontinence of urine  Her symptoms have gotten worse in the last 6 months  Social Support:     Lives in:  [address] with: lives with 80year old mother; she is her caretaker  Work status: retired    History of Depression: yes    treated for her depression with medication  Diet and Exercise:      none  OB/ gyn History    Gestational History:     Prior Pregnancy: No      Delivery Complications:  GYN history:  None other then hysterectomy and cancer    Menstrual History:      Menopause: went though menopause in 44's  Bladder Function:     Voiding Difficulties positive for: urgency, frequent urination and incomplete emptying (sometimes)      Voiding Difficulties negative for: hesitancy and straining      Voiding Difficulties comments:     Urinary leakage: urine leakage    Urinary leakage aggravated by: coughing, sneezing and walking to the bathroom    Urinary leakage not aggravated by: bending and standing up    Intake (ounces): Intake (ounces) comment: 32 ounces of ice tea (sweetened and caffeinated) in the morning  Water after: 2-3 bottles a day (more at night)  More iced tea around 2-3 o'clock  Drinks [employer]  No coffee  No soda  Incontinence Management:     Pads/Diaper Use:  24 hours    Pads/Diapers Additional Comments: pads are pretty saturated when she changes; patient wet overnight, changes at least 1 time overnight  Bowel Function:     Voiding DIfficulties: painful defecating and unfinished feeling after defecating      Bowel Function comments:  Was not very regular previously   Hard stools  Pain with emptying  Straining to empty  Bleeding with emptying  Incomplete emptying sensation    Stool frequency: 1-2 times a week      [place] Stool Scale: type 1  Sexual Function: Sexually Active:  Not sexually active  Pain:     Location:  Only when she hasnt had a bowel movement some pain and cramping lower abdominal region      Objective     Static Posture   General Observations  Flexed  Head  Forward  Shoulders  Rounded  Thoracic Spine  Hyperkyphosis  Lumbar Spine   Flattened       Neurological Testing     Sensation     Lumbar   Left   Intact: light touch    Right   Intact: light touch    Strength/Myotome Testing     Left Hip   Planes of Motion   Flexion: 4-  Abduction: 4-  Adduction: 4-    Right Hip   Planes of Motion   Flexion: 3+  Abduction: 4-  Adduction: 4-    Left Knee   Flexion: 4-  Extension: 4-    Right Knee   Flexion: 3+  Extension: 3+    Left Ankle/Foot   Dorsiflexion: 4-  Plantar flexion: 4-    Right Ankle/Foot   Dorsiflexion: 3+  Plantar flexion: 3+  Pelvic Floor Exam     General Perineum Exam:     General perineum exam comments: Pelvic floor verbal consent and written consent signed and in chart - will give NV  * pelvic floor assessment to be performed NV *    Education provided today:   Time Spent on Patient Education:    Pelvic floor anatomy and function  Physiology/relationship of abdominal canister and pelvis/pelvic organs/pelvic floor muscles  Diaphragm and Diaphragmatic breathing  Bowel and Bladder anatomy and function  Constipation Education    Hormonal changes during and after menopause  Chemo and radiation considerations  PT exam and course of treatment  Bladder and Bowel diary                      Precautions: history of endometrial cancer, chemotherapy and radiation; history of R LE blood clots; R ureteral stent  [facility] HEP    Manuals 2/6                                                                Neuro Re-Ed             Diaphragmatic Breathing             TA ADIM             TA + PFMC                          Biofeedback sEMG             PFMC: Slow Holds             PFMC: Quick Flicks             PFMC + hip abd isometrics             PFMC + hip add isometrics             PFMC + ecc bridge             PFMC in sitting             PFMC seated + tilt             PFMC in standing             PFMC in Quadruped                                                                 Ther Ex             PPT             Bridge + SLR             Bridge + marching                                                                              Ther Activity             EDUCATION             PFMC + reach             PFMC + squat             PFMC + lift             PFMC + sit to stand             PFMC + step up/down             Gait Training                                       Modalities

## 2023-02-06 NOTE — PATIENT INSTRUCTIONS
Nutrition Rx & Recommendations:  Small, frequent meals/snacks may be easier to tolerate than 3 large daily meals  Aim for 5-6 small meals per day (every 2-3 hours)  Include protein at all meals/snacks  Follow a Cancer Preventative Nutrition Pattern: colorful, plant-based, low-fat, avoid added sugars, limit alcohol, include high fiber foods, limit processed meats, limit red meat, choose lean protein sources, use low-fat cooking methods, balance calories with physical activity, avoid excessive weight gain throughout life  Follow proper oral care; Try baking soda/salt water rinse recipe (mix 3/4 tsp salt + 1 tsp baking soda + 1 qt water; rinse with plain water after using) in Eating Hints book (pg 18)  Brush your teeth before/after meals & before bed  For constipation: drink plenty of fluids (>64 oz/day); drink hot liquids; eat high-fiber foods as tolerated (whole grains, beans, peas, nuts, seeds, fruits, vegetables, etc); increase physical activity as tolerated  Avoid increasing fiber intake too quickly, add fiber into your diet slowly; keep a record of your bowel movements (see pages 13-14 in you Eating Hints book)  For dry mouth: sip water throughout the day; try very sweet (or tart, as tolerated) drinks; chew gum or suck on hard candy, popsicles, & ice chips; eat easy to swallow foods (such as pureed cooked foods or soups); moisten food with sauce/gravy/salad dressing; do not drink beer, wine, or any type of alcohol; keep lips moist with lip balm; avoid tobacco products & second-hand smoke; try Biotene as needed (see pages 17-18 in your Eating Hints book)  Follow proper oral care; Try baking soda/salt water rinse recipe (mix 3/4 tsp salt + 1 tsp baking soda + 1 qt water; rinse with pain water after using) in Eating Hints book (pg 18)  Brush your teeth before/after meals & before bed     Try dry mouth spray and using a humidifier at bedside to help manage your dry mouth at night  Work on pre-planning meals/snacks and keep a food journal if you find it to be helpful  Aim for 5-6 small meals per day (every 2-3 hrs)  Choose a lean protein + fiber source at all eating occasions    Patient choosen goals:   Increase fruit and vegetable intake (try blending frozen fruits and/or spinach/kale with CIB shake)  Continue to have something for breakfast, even if it is just a CIB shake  Resume apple prune sauce for constipation     Follow Up Plan: 3/6 at 10am   Recommend Referral to Other Providers: none at this time

## 2023-02-06 NOTE — TELEPHONE ENCOUNTER
----- Message from Augustus Stafford PA-C sent at 2/6/2023  1:48 PM EST -----  Please call patient with labs: Your vitamin A level is  low, which can affect your night vision  Recommend that you take 10,000  IU of retinyl acetate or retinyl palmitate ( Vitamin A) per day for 3 months  It is important that you take an actual vitamin A supplement for repletion, and not a carotene based supplement  Vitamin A can cause birth defects if you are pregnant  If you are pregnant, consult with your OB for recommendations  Your OB may recommend carotene supplements  If you are not sure if you are pregnant, take a home pregnancy test to determine if you are pregnant  You are not to take vitamin A supplements if you are pregnant  After 3 months,  discontinue the vitamin A supplement and check your vitamin A level  Long term vitamin A supplementation can be toxic, so it is important to discontinue your vitamin A supplementation after 12 weeks  If you experience symptoms of toxicity such as :  Nausea, vomiting, blurred vision, severe headache, and vertigo, discontinue the vitamin A supplement immediately and call the office  Your PTH (parathyroid hormone) and ferritin are both elevated  In this care these are likely inflammatory markers due to cancer history, however her iron is low and she is anemic  I would recommend patient see a hematologist for further evaluation as she may need infusions for quicker repletion of her iron stores  If pt is agreeable please let me know and I will place referral     Will repeat her vit a , iron panel and PTH in about 4 months I have already ordered these in the system    BUN is elevated this may be due to dehydration   Ensure at least 64 oz fluid daily and follow up with PCP on this level

## 2023-02-07 ENCOUNTER — TELEPHONE (OUTPATIENT)
Dept: GENETICS | Facility: CLINIC | Age: 68
End: 2023-02-07

## 2023-02-07 ENCOUNTER — TELEPHONE (OUTPATIENT)
Dept: HEMATOLOGY ONCOLOGY | Facility: CLINIC | Age: 68
End: 2023-02-07

## 2023-02-07 ENCOUNTER — NURSE TRIAGE (OUTPATIENT)
Age: 68
End: 2023-02-07

## 2023-02-07 ENCOUNTER — TELEPHONE (OUTPATIENT)
Dept: NEPHROLOGY | Facility: CLINIC | Age: 68
End: 2023-02-07

## 2023-02-07 NOTE — LETTER
[address] [address]        FLEXIBLE COLONOSCOPY INSTRUCTIONS  PLEASE NOTE    AS OF JUNE 1, 2014, OUR OFFICE REQUIRES 72 HOURS NOTICE OF CANCELLATION/RESCHEDULE OF A PROCEDURE TO AVOID INCURRING A MISSED APPOINTMENT FEE  Your Colonoscopy Procedure has been scheduled at:  [address], [address] [facility]    The date of your procedure is: May 24, 2023       The hospital facility will contact you the evening prior to your scheduled procedure with your arrival time  Use the bowel preparation as directed  Check with your family doctor if you are taking a blood thinner (Coumadin, Plavix, Xarelto, Pradaxa, Gingko biloba, Ginseng, Feverfew, St  Xavier's Wort)  We suggest stopping these for 3 days  Special instructions may be needed if you are taking aspirin or any aspirin-containing medication  Check with your family physician  If you are on DIABETIC MEDICATION (tablets or insulin) your doctor may make changes in your preparation  Take all medications usual unless otherwise instructed  As always, if you have any questions or concerns, feel free to call the office  Our  staff will be happy to connect you with one of the nurses to answer any questions or address any concerns you have regarding your procedure  Do not wear any jewelry the day of your procedure including wedding rings  Arrangements must be made for a responsible party to drive you home from the procedure  If you do not have a responsible family member or friend to drive you home, the procedure will not be done  Vast or a taxi is not acceptable  It is important you notify our office of any insurance changes prior to your procedure and, if necessary, supply us with referrals from your primary care physician      COLONOSCOPY PREPARATION INSTRUCTIONS    Purchase (prescription not required):  · 238 gram bottle of Miralax® (Glycolax®)  · 4 Dulcolax® (Bisacodyl) Laxative Tablets  · 64 oz  bottle of Gatorade® or your preference of a non-carbonated clear liquid - NOT RED OR PURPLE     One Day Prior to Colonoscopy Procedure  · Nothing to eat the day before your procedure, only clear liquids  · It is important that you drink plenty of clear liquids throughout the day to prevent dehydration  Clear Liquids include:  o Water/Iced Tea/Lemonade/Gatorade®/Black Coffee or tea (no milk or creamers  o Soft drinks: orange, ginger ale, cola, Pepsi®, Sprite®, 7Up®  o Asael-Aid® (lemonade or orange flavors only)  o Strained fruit juices without pulp such as apple, white grape, white cranberry  o Jell-O®, lemon, lime or orange (no fruit or toppings)  o Popsicles, [place] Ice (No Ice Cream, sherbets, or fruit bars)  o Chicken or beef bouillon/broth  DO NOT EAT OR DRINK ANYTHING RED OR PURPLE  DO NOT DRINK ANY ALCOHOLIC BEVERAGES  DIABETIC PATIENTS: Consult your physician    At 4:00 pm, take (2) Dulcolax® (Bisacodyl) Laxative Tablets  Swallow the tablets whole with an 8 oz  glass of water  At 8:00 pm, take the additional (2) Dulcolax® (Bisacodyl) Laxative Tablets with 8 oz  of water  The package may direct you not to exceed (2) tablets at any time but for the purpose of this examination, you should take (4) total     Mix the 238 gm of Miralax® in 64 oz  of Gatorade® and shake the solution until the Miralax® is dissolved  You will drink half (32 oz) of this solution this evening, beginning between 4 and 6 o'clock  Drink 8 oz glassfuls at your own pace  It may take several hours to drink the solution  Remember to stay close to toilet facilities  DAY OF COLONOSCOPY PROCEDURE    Five (5) hours before your procedure, drink the other half (32 oz) of the Miralax®/Gatorade® mixture within a two (2) hour period  This may require you to get up very early if you are scheduled for an early procedure      NOTHING IS TO BE TAKEN BY MOUTH 3 HOURS PRIOR TO PROCEDURE  If you use an inhaler, please bring it with you to your procedure

## 2023-02-07 NOTE — TELEPHONE ENCOUNTER
I called Ragini to schedule a new patient appointment with the Cancer [facility] Genetics [facility]       Outcome:  Genetics appointment scheduled for June 21 at 11:00 am  Patient has been added to the wait list      Personal/Family History Related to Appointment:  Phx of endometrial cancer 2017, recurred 2019  Family hx ovarian cancer (mom), breast cancer (sister), non-hodgkin lymphoma (father), Uterine cancer ([facility])  Patient is non-Christian  History of Genetic Testing:  Patient reports family history of genetic testing  sister had brca testing negative       Genetics Family History Questionnaire:  I confirmed the patient's e-mail on file as the best e-mail to send an invite link for our genetics family history intake

## 2023-02-07 NOTE — TELEPHONE ENCOUNTER
Spoke with patient about the following, she expressed understanding and thanked us for the call:    Yes she can hold it for a week and call us next week with her blood pressure readings

## 2023-02-07 NOTE — TELEPHONE ENCOUNTER
Made attempt to schedule a new patient appointment with Hematology oncology, patient states that she is currently busy and will call back to schedule a appointment

## 2023-02-07 NOTE — TELEPHONE ENCOUNTER
Patient called because her blood pressure has been running in 90's/60's and she's been having some lightheadedness  She was wondering if she should reduce the lisinopril

## 2023-02-07 NOTE — TELEPHONE ENCOUNTER
Patient calling to schedule colonoscopy  Stated she had one done yrs ago, doesn't remember who did it  Verified information  Please call to schedule with Dr Lupe Florentino

## 2023-02-08 ENCOUNTER — CONSULT (OUTPATIENT)
Dept: HEMATOLOGY ONCOLOGY | Facility: CLINIC | Age: 68
End: 2023-02-08

## 2023-02-08 ENCOUNTER — TELEPHONE (OUTPATIENT)
Dept: HEMATOLOGY ONCOLOGY | Facility: CLINIC | Age: 68
End: 2023-02-08

## 2023-02-08 VITALS
HEIGHT: 59 IN | TEMPERATURE: 97.6 F | DIASTOLIC BLOOD PRESSURE: 62 MMHG | WEIGHT: 122 LBS | HEART RATE: 88 BPM | BODY MASS INDEX: 24.6 KG/M2 | RESPIRATION RATE: 14 BRPM | SYSTOLIC BLOOD PRESSURE: 100 MMHG | OXYGEN SATURATION: 95 %

## 2023-02-08 DIAGNOSIS — C54.1 ENDOMETRIAL CANCER (HCC): ICD-10-CM

## 2023-02-08 DIAGNOSIS — D50.8 IRON DEFICIENCY ANEMIA SECONDARY TO INADEQUATE DIETARY IRON INTAKE: ICD-10-CM

## 2023-02-08 DIAGNOSIS — D50.8 OTHER IRON DEFICIENCY ANEMIA: Primary | ICD-10-CM

## 2023-02-08 DIAGNOSIS — Z45.2 ENCOUNTER FOR CENTRAL LINE CARE: ICD-10-CM

## 2023-02-08 DIAGNOSIS — R79.0 ABNORMAL IRON SATURATION: ICD-10-CM

## 2023-02-08 RX ORDER — SODIUM CHLORIDE 9 MG/ML
20 INJECTION, SOLUTION INTRAVENOUS ONCE
Status: CANCELLED | OUTPATIENT
Start: 2023-02-16

## 2023-02-08 NOTE — PROGRESS NOTES
Oncology Outpatient Consult Note  Humberto Melendez 74 y o  female MRN: @ Encounter: [id number]        Date:  2/8/2023        CC: History of endometrial cancer status postchemotherapy and radiation now with iron deficiency and anemia  The patient was on rucaparib, atezolizumab, and bevacizumab apparently as part of a clinical trial from what I can tell in the notes  She is also had radiation to the pelvis in 2020      HPI:  Annmarie Vuong is seen for initial consultation 2/8/2023 regarding iron deficiency  The patient has a history of endometrial cancer which was a stage Ib malignancy for which she was on a clinical trial and was on treatment until December 5, 2022 i e  35 cycles  Recently she was anemic and was worked up to include an iron panel which showed an iron saturation of 14% with a TIBC of 224 and iron level of 31  Patient's B12 was actually high and then within normal limits when checked  She was referred to see us for her iron deficiency along with normochromic normocytic anemia  From the record it appears hemoglobin was normal till around October 2022 when it started going down  The patient has been set up for a GI work-up and states she is seeing a gastroenterologist   Hopefully she will be having an EGD and colonoscopy done  In terms of her iron deficiency I explained I would replace with Venofer  She was on oral iron but Venofer will bring this number up quickly  She does feel fatigued  Denies any nausea vomiting  Is set up for imaging through her gynecologic oncologist in March  The rest of her 14 point review of systems today was negative          Cancer Staging:  Cancer Staging   Endometrial cancer [facility])  Staging form: Corpus Uteri - Carcinoma, AJCC 7th Edition  - Clinical stage from 12/19/2017: FIGO Stage IB (T1b, N0, M0) - Signed by Graham Morales MD on 2/12/2018  Staged by: Managing physician  Histopathologic type: Endometrioid adenocarcinoma, NOS  Tumor size (mm): 44  Histologic grade (G): G1  Lymph-vascular invasion (LVI): LVI not present (absent)/not identified  Residual tumor (R): R0 - None  Pelvic deb status: Negative  Para-aortic status: Not assessed  Omentectomy performed: No  Stage used in treatment planning: Yes  National guidelines used in treatment planning: Yes      Previous Hematologic/ Oncologic History:    Oncology History   Endometrial cancer ([facility] )   11/17/2017 Initial Diagnosis    Endometrial cancer ([facility] [address] )     11/17/2017 Biopsy    ENDOMETRIAL BIOPSY: WELL DIFFERENTIATED ENDOMETRIAL ADENOCARCINOMA (FIGO I) WITH FOCALMUCINOUS FEATURES  Part B: ENDOCERVICAL POLYP:BENIGN ENDOCERVICAL      12/19/2017 Surgery    Robotic assisted total laparoscopic hysterectomy with bilateral salpingo-oophorectomy and sentinel bilateral pelvic lymph node dissection  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 (4 4 x 3 2 cm tumor, 9 4/15 4mm invasion, NO LVSI, washings revealed atypical cellular changes)     12/19/2017 Genetic Testing    Morrison testing negative     6/28/2019 Biopsy    A  Breast, Right, US BX Right Breast 1000 4 cmfn:  - Benign breast tissue with focal histiocytic aggregate  See comment   - Negative for atypia and in-situ or invasive carcinoma  7/8/2019 Recurrence    Presented with right lower extremity DVT and CT demonstrating right pelvic sidewall mass with venous, ureteral and nerve compression causing significant neuropathic pain  Biopsy:  Lymph Node, Right pelvic lymph node x3:  - High-grade adenocarcinoma  7/30/2019 - 1/6/2020 Chemotherapy    Taxol 175 mg/m2 and carboplatin AUC 6 every 21 days  Dose was reduced to taxol 135 mg/m2 and carboplatin AUC 5  Completed 6 cycles  Treatment protracted due to multiple hospital admissions       2/24/2020 - 4/13/2020 Radiation    adjuvant external beam radiation therapy to the whole pelvis to 4500 cGy followed by boost to gross disease of an additional 2200 cGy     11/23/2020 Progression New necrotic adenopathy in the retroperitoneum on CT      12/21/2020 -  Chemotherapy    Began chemotherapy with rucaparib, atezolizumab, and bevacizumab as per [facility] clinical trial     Rucaparib held cycle 28 secondary to fatigue  Avastin held since cycle 30 for elevated UPC  Treatment held for one cycle after cycle 31 for mucositis/fatigue         Test Results:    Imaging: No results found  Labs:   Lab Results   Component Value Date    WBC 5 41 01/28/2023    HGB 9 4 (L) 01/28/2023    HCT 30 7 (L) 01/28/2023    MCV 97 01/28/2023     01/28/2023     Lab Results   Component Value Date     10/27/2017    K 4 3 01/28/2023     (H) 01/28/2023    CO2 23 01/28/2023    BUN 29 (H) 01/28/2023    CREATININE 1 12 01/28/2023    GLUCOSE 219 (H) 12/19/2017    GLUF 86 01/28/2023    CALCIUM 8 8 01/28/2023    CORRECTEDCA 10 5 (H) 11/11/2022    AST 8 (L) 01/28/2023    ALT 8 01/28/2023    ALKPHOS 108 (H) 01/28/2023    PROT 6 9 10/27/2017    BILITOT 0 3 10/27/2017    EGFR 51 01/28/2023         Lab Results   Component Value Date    SPEP See Comment 10/31/2022       Lab Results   Component Value Date    IRON 31 (L) 01/28/2023    TIBC 224 (L) 01/28/2023    FERRITIN 467 (H) 01/28/2023       Lab Results   Component Value Date    WNPONQBE25 411 01/28/2023     ROS: As stated in history of present illness otherwise her 14 point review of systems today was negative      Active Problems:   Patient Active Problem List   Diagnosis   • Benign essential hypertension   • Major depression, chronic   • Dyslipidemia   • Endometrial cancer ([facility] [address] )   • Encounter for follow-up surveillance of endometrial cancer   • Acute deep vein thrombosis (DVT) of proximal vein of lower extremity ([facility] )   • Breast cancer screening   • Encounter for central line care   • Cancer related pain   • Chemotherapy induced neutropenia (HCC)   • Anemia   • Chemotherapy-induced nausea   • Hypokalemia   • Hyponatremia   • Generalized weakness   • Bilateral lower extremity edema   • Moderate protein-calorie malnutrition (HCC)   • Ambulatory dysfunction   • Hypomagnesemia   • Obstruction of right ureter   • Overactive bladder   • Acute left-sided low back pain without sciatica   • Nephrostomy status (HCC)   • Bilateral piriformis syndrome   • Chronic pain syndrome   • Myofascial pain syndrome   • Pathological fracture due to osteoporosis   • Osteoporosis   • Drug induced constipation   • Dental disorder   • Need for follow-up by    • Exertional dyspnea   • Closed nondisplaced fracture of right pubis with delayed healing   • Secondary malignant neoplasm of other specified sites [facility])   • Chronic UTI   • Cachexia ([facility] [address] )   • Continuous opioid dependence ([facility] [address] )   • Depression, recurrent ([facility] )   • Drug-induced anemia   • Burning with urination   • Vaginal atrophy   • Vulvar irritation   • Nephrotoxicity   • Mucositis   • Chronic kidney disease, stage 3a ([facility])   • Dry mouth       Past Medical History:   Past Medical History:   Diagnosis Date   • Anemia    • Chemotherapy follow-up examination    • Depression    • Endometrial cancer ([facility] [address] ) 12/2017   • History of chemotherapy     started 12/2021endometrial cancer   • Hyperglycemia     vx type 2 dm -- last assessed 4/1/14; resolved 11/7/17   • Hyperlipidemia    • Hypertension    • Obesity     last assessed 10/14/17; resolved 11/7/17       Surgical History:   Past Surgical History:   Procedure Laterality Date   • ABDOMINAL SURGERY      GASTRIC BYPASS; 2001   • BREAST BIOPSY Right 06/28/2019    core biopsy; benign   • CHOLECYSTECTOMY      at the time of gastric bypass   • COLONOSCOPY     • CT NEEDLE BIOPSY LYMPH NODE  07/08/2019   • FL GUIDED CENTRAL VENOUS ACCESS DEVICE INSERTION  11/12/2019   • GASTRIC BYPASS     • HYSTERECTOMY Bilateral 2017    total abdominal with salpingo-oophorectomy   • IR NEPHROSTOMY TO NEPHROURETERAL STENT  06/09/2022   • IR NEPHROSTOMY TO NEPHROURETERAL STENT  12/20/2022   • IR NEPHROSTOMY TUBE CHECK/CHANGE/REPOSITION/REINSERTION/UPSIZE  05/27/2022   • IR NEPHROSTOMY TUBE PLACEMENT  07/26/2019   • IR NEPHROURETERAL STENT CHECK/CHANGE/REPOSITION  04/06/2021   • IR NEPHROURETERAL STENT CHECK/CHANGE/REPOSITION  06/04/2021   • IR NEPHROURETERAL STENT CHECK/CHANGE/REPOSITION  09/03/2021   • IR NEPHROURETERAL STENT CHECK/CHANGE/REPOSITION  12/07/2021   • IR NEPHROURETERAL STENT CHECK/CHANGE/REPOSITION  03/08/2022   • IR NEPHROURETERAL STENT CHECK/CHANGE/REPOSITION  05/10/2022   • IR NEPHROURETERAL STENT CHECK/CHANGE/REPOSITION  09/19/2022   • IR PICC LINE  09/27/2019   • IR PORT PLACEMENT  07/26/2019   • IR PORT REMOVAL  09/20/2019   • OOPHORECTOMY Bilateral 2017   • AR INSJ TUNNELED CTR VAD W/SUBQ PORT AGE 5 YR/> Left 11/12/2019    Procedure: INSERTION VENOUS PORT ( PORT-A-CATH) IR;  Surgeon: Mickie Ochoa DO;  [facility];  Service: Interventional Radiology   • AR LAPS ABD PRTM&OMENTUM DX W/WO SPEC BR/WA SPX N/A 12/19/2017    Procedure: LAPAROSCOPY DIAGNOSTIC;  Surgeon: Drake Frazier MD;  Location[facility];  Service: Gynecology Oncology   • AR LAPS Graylon Liming HYST W/BILAT LMPHADEC RMVL TUBE/OVARY N/A 12/19/2017    Procedure: HYSTERECTOMY LAPAROSCOPIC TOTAL ([address]) W/ ROBOTICS; BILATERAL SALPINGOOPHERECTOMY; LYMPH NODE DISSECTION; lysis of adhesions;  Surgeon: Drake Frazier MD;  Location[facility];  Service: Gynecology Oncology   • TONSILLECTOMY     • US GUIDED BREAST BIOPSY RIGHT COMPLETE Right 06/28/2019       Family History:    Family History   Problem Relation Age of Onset   • Hyperlipidemia Mother    • Heart disease Mother    • Ovarian cancer Mother 48   • Colon cancer Mother    • Lymphoma Father    • Breast cancer Sister 61   • No Known Problems Brother    • No Known Problems Brother    • No Known Problems Maternal Grandmother    • Bone cancer Maternal Grandfather    • Uterine cancer Paternal Grandmother    • No Known Problems Paternal Grandfather    • No Known Problems Maternal Aunt • No Known Problems Maternal Aunt    • No Known Problems Maternal Aunt    • No Known Problems Maternal Aunt    • No Known Problems Paternal Aunt    • No Known Problems Paternal Aunt    • No Known Problems Paternal Aunt    • No Known Problems Paternal Aunt        Cancer-related family history includes Bone cancer in her maternal grandfather; Breast cancer (age of onset: 61) in her sister; Colon cancer in her mother; Lymphoma in her father; Ovarian cancer (age of onset: 48) in her mother; Uterine cancer in her paternal grandmother  Social History:   Social History     Socioeconomic History   • Marital status: Single     Spouse name: Not on file   • Number of children: Not on file   • Years of education: Not on file   • Highest education level: Not on file   Occupational History   • Not on file   Tobacco Use   • Smoking status: Never   • Smokeless tobacco: Never   Vaping Use   • Vaping Use: Never used   Substance and Sexual Activity   • Alcohol use: Not Currently   • Drug use: No   • Sexual activity: Not Currently   Other Topics Concern   • Not on file   Social History Narrative   • Not on file     Social Determinants of Health     Financial Resource Strain: Not on file   Food Insecurity: Not on file   Transportation Needs: Not on file   Physical Activity: Not on file   Stress: Not on file   Social Connections: Not on file   Intimate Partner Violence: Not on file   Housing Stability: Not on file       Current Medications:   Current Outpatient Medications   Medication Sig Dispense Refill   • ARIPiprazole (ABILIFY) 20 MG tablet Take 20 mg by mouth in the morning       • aspirin (ECOTRIN LOW STRENGTH) 81 mg EC tablet Take 81 mg by mouth daily     • Calcium-Magnesium-Vitamin D (CALCIUM 500 PO) Take by mouth daily      • cholecalciferol (VITAMIN D3) 1,000 units tablet Take 4 tablets (4,000 Units total) by mouth daily 90 tablet 0   • Cranberry-Vitamin C 250-60 MG CAPS Take 1 tablet by mouth in the morning 90 capsule 3 • Cyanocobalamin (VITAMIN B12 PO) Take by mouth daily      • dronabinol (MARINOL) 2 5 mg capsule Take 1 capsule (2 5 mg total) by mouth 2 (two) times a day before meals 30 capsule 0   • enoxaparin (LOVENOX) 80 mg/0 8 mL INJECT 0 8ML (80MG TOTAL)  SUBCUTANEOUSLY EVERY 24  HOURS 72 mL 1   • folic acid (FOLVITE) 1 mg tablet Take 1 tablet (1 mg total) by mouth daily  0   • Gemtesa 75 MG TABS TAKE 75 MG BY MOUTH IN THE MORNING 90 tablet 2   • lisinopril (ZESTRIL) 5 mg tablet Take 1 tablet (5 mg total) by mouth daily 90 tablet 3   • LORazepam (ATIVAN) 0 5 mg tablet Take 1 tablet (0 5 mg total) by mouth every 6 (six) hours as needed for anxiety (or nausea) 36 tablet 1   • Multiple Vitamin (MULTIVITAMIN) tablet Take 1 tablet by mouth daily     • naloxone (NARCAN) 4 mg/0 1 mL nasal spray Administer 1 spray into a nostril  If no response after 2-3 minutes, give another dose in the other nostril using a new spray   1 each 0   • omeprazole (PriLOSEC) 20 mg delayed release capsule Take 20 mg by mouth daily     • ondansetron (ZOFRAN) 8 mg tablet Take 1 tablet (8 mg total) by mouth every 8 (eight) hours as needed for nausea or vomiting 30 tablet 1   • oxybutynin (DITROPAN XL) 15 MG 24 hr tablet Take 1 tablet (15 mg total) by mouth daily at bedtime 90 tablet 3   • saccharomyces boulardii (FLORASTOR) 250 mg capsule Take 1 capsule (250 mg total) by mouth 2 (two) times a day  0   • senna (SENOKOT) 8 6 MG tablet Take 1 tablet (8 6 mg total) by mouth 2 (two) times a day as needed for constipation 60 tablet 0   • venlafaxine (EFFEXOR) 75 mg tablet Take 75 mg by mouth 3 (three) times a day       • amoxicillin (AMOXIL) 500 mg capsule  (Patient not taking: Reported on 1/13/2023)     • BD PosiFlush 0 9 % SOLN  (Patient not taking: Reported on 1/13/2023)     • clotrimazole-betamethasone (LOTRISONE) 1-0 05 % cream Apply topically 2 (two) times a day (Patient not taking: Reported on 1/13/2023) 30 g 0   • ergocalciferol (VITAMIN D2) 50,000 units Take 1 capsule (50,000 Units total) by mouth once a week for 6 doses (Patient not taking: Reported on 11/29/2022) 6 capsule 0   • estradiol (ESTRACE VAGINAL) 0 1 mg/g vaginal cream Insert 1g nightly for 6 weeks, then 1-2x weekly  (Patient not taking: Reported on 1/20/2023) 42 5 g 1   • methylprednisolone (MEDROL) 4 mg tablet Take 1 tablet (4 mg total) by mouth in the morning Take 2 tables once a day for 5 days, take 1 tablet once a day for 5 days, take 1 tablet every other day for 14 days  (Patient not taking: Reported on 1/20/2023) 21 tablet 0   • rucaparib (RUBRACA) 300 mg tablet Take 600 mg by mouth every 12 (twelve) hours Take with or without food  Do not repeat a vomited dose  • sodium chloride, PF, 0 9 % 10 mL by Intracatheter route daily (Patient not taking: Reported on 1/13/2023) 300 mL 3     No current facility-administered medications for this visit  Allergies: Allergies   Allergen Reactions   • Cephalosporins Rash     Which Cephalosporin reaction was to not specified; however, has tolerated Amoxicillin, Cefazolin, and Cefepime         Physical Exam:    Body surface area is 1 48 meters squared  Wt Readings from Last 3 Encounters:   02/08/23 55 3 kg (122 lb)   02/06/23 54 4 kg (120 lb)   02/03/23 53 8 kg (118 lb 8 oz)        Temp Readings from Last 3 Encounters:   02/08/23 97 6 °F (36 4 °C) (Temporal)   02/03/23 98 8 °F (37 1 °C) (Temporal)   01/30/23 (!) 97 3 °F (36 3 °C) (Temporal)        BP Readings from Last 3 Encounters:   02/08/23 100/62   02/03/23 98/58   01/30/23 100/62         Pulse Readings from Last 3 Encounters:   02/08/23 88   02/03/23 84   01/30/23 82       Physical Exam     Constitutional   General appearance: No acute distress, well appearing and well nourished  Eyes   Conjunctiva and lids: No swelling, erythema or discharge  Pupils and irises: Equal, round and reactive to light      Ears, Nose, Mouth, and Throat   External inspection of ears and nose: Normal  Nasal mucosa, septum, and turbinates: Normal without edema or erythema  Oropharynx: Normal with no erythema, edema, exudate or lesions  Pulmonary   Respiratory effort: No increased work of breathing or signs of respiratory distress  Auscultation of lungs: Clear to auscultation  Cardiovascular   Palpation of heart: Normal PMI, no thrills  Auscultation of heart: Normal rate and rhythm, normal S1 and S2, without murmurs  Examination of extremities for edema and/or varicosities: Normal     Carotid pulses: Normal     Abdomen   Abdomen: Non-tender, no masses  Liver and spleen: No hepatomegaly or splenomegaly  Lymphatic   Palpation of lymph nodes in neck: No lymphadenopathy  Musculoskeletal   Gait and station: Normal     Digits and nails: Normal without clubbing or cyanosis  Inspection/palpation of joints, bones, and muscles: Normal     Skin   Skin and subcutaneous tissue: Normal without rashes or lesions  Neurologic   Cranial nerves: Cranial nerves 2-12 intact  Sensation: No sensory loss  Psychiatric   Orientation to person, place, and time: Normal     Mood and affect: Normal           Assessment/ Plan:      The patient is a pleasant 72-year-old female with a past medical history of endometrial malignancy for which she has had pelvic radiation and chemotherapy in the past and more recently was onrucaparib, atezolizumab, and bevacizumab apparently as part of a clinical trial who was referred to see us for iron deficiency anemia  She is in the process of having a GI work-up done and is anemic although she does have normal, normocytic indices  Emphasized importance of a GI work-up with these indices but based on her low iron we will replace her iron levels with Venofer  This will be 200 mg IV of Venofer once a week for 8 doses  We will see her back in 4 months with repeat blood work    She will continue to follow with her gynecologic oncologist for her endometrial malignancy and is already set up for imaging  If she has any questions she will call our office  Again her anemia was normochromic normocytic so could be related to previous pelvic radiation, recent treatment for her malignancy but I suspect iron deficiency is definitely contributing so we will correct this  Other possibility this includes GI bleeding and she is already set up for a work-up according to her  We will see her back in 4 months with repeat blood work  She will get 8 doses of Venofer  Goals and Barriers:  Current Goal: Prolong Survival from iron deficiency anemia  Barriers: None  Patient's Capacity to Self Care:  Patient able to self care  Portions of the record may have been created with voice recognition software  Occasional wrong word or "sound a like" substitutions may have occurred due to the inherent limitations of voice recognition software  Read the chart carefully and recognize, using context, where substitutions have occurred

## 2023-02-08 NOTE — TELEPHONE ENCOUNTER
While we try to accommodate patient requests, our priority is to schedule treatment according to Doctor's orders and site availability  1  Does the Provider use the intake sheet or checkout note? Note  2  What would be a preferred day of the week that would work best for your infusion appointment? Thursday  3  Do you prefer mornings or afternoons for your appointments? AM  4  Are there any days or dates that do not work for your schedule, including any upcoming vacations? 2/23 - can only do morning due to appt @ 7715  5  We are going to try our best to schedule you at the infusion center closest to your home  In the event that we are unable to what would be your next preferred infusion site or sites? 1  [facility]  2  [facility]    6  Do you have transportation to take you to all of your appointments? yes  7   Would you like the infusion center to draw labs from your port? (disregard if patient doesn't have a port or need labs for infusion appointment) yes

## 2023-02-10 ENCOUNTER — HOSPITAL ENCOUNTER (OUTPATIENT)
Dept: INFUSION CENTER | Facility: CLINIC | Age: 68
Discharge: HOME | End: 2023-02-10

## 2023-02-10 DIAGNOSIS — D70.1 CHEMOTHERAPY INDUCED NEUTROPENIA (HCC): ICD-10-CM

## 2023-02-10 DIAGNOSIS — E83.42 HYPOMAGNESEMIA: ICD-10-CM

## 2023-02-10 DIAGNOSIS — T45.1X5A CHEMOTHERAPY INDUCED NEUTROPENIA (HCC): ICD-10-CM

## 2023-02-10 DIAGNOSIS — M80.00XS OSTEOPOROSIS WITH CURRENT PATHOLOGICAL FRACTURE, UNSPECIFIED OSTEOPOROSIS TYPE, SEQUELA: Primary | ICD-10-CM

## 2023-02-13 ENCOUNTER — OFFICE VISIT (OUTPATIENT)
Dept: PHYSICAL THERAPY | Facility: REHABILITATION | Age: 68
End: 2023-02-13

## 2023-02-13 DIAGNOSIS — R39.15 URINARY URGENCY: ICD-10-CM

## 2023-02-13 DIAGNOSIS — R32 INCONTINENCE IN FEMALE: Primary | ICD-10-CM

## 2023-02-13 NOTE — PROGRESS NOTES
Daily Note     Today's date: 2023  Patient name: Luca Pelaez  : 1955  MRN: [id number]  Referring provider: RAFITA Conway  Dx:   Encounter Diagnosis     ICD-10-CM    1  Incontinence in female  R32       2  Urinary urgency  R39 15           Start Time: 1405  Stop Time: 1500  Total time in clinic (min): 55 minutes    Subjective: The patient brought her bladder logs in with her to her session today from 2/10 and   She does experience urinary frequency, urgency, and incontinence  See attached logs  Objective: See treatment diary below    Objective   Pelvic Floor Exam     General Perineum Exam:   perineum intact  Positive for perianal erythema (red/purple, dry patches of skin; labia majora, minora; irritation from dampness of pad against skin)     Negative for swelling, descent, lesion, rectal irritation and gaping introitus    General perineum exam comments: Pelvic floor verbal consent and written consent signed and in chart    Education provided today:   Time Spent on Patient Education: 15 min    Pelvic floor anatomy and function  Physiology/relationship of abdominal canister and pelvis/pelvic organs/pelvic floor muscles  Diaphragm and Diaphragmatic breathing  Bowel and Bladder anatomy and function    Pelvic Organ Prolapse - explanation and discussion of management of symptoms  Importance of body mechanics and ergonomics in regards to protecting against activities which increase IAP and pressure    Toileting posture and body mechanics  Constipation Education  Pelvic and chronic pain education  "The Poo in You" Educational video (peds and family) on [employer]    Hormonal and MSK changes during pregnancy   Post partum posture and MSK changes  Hormonal changes during and after menopause  Surgical and post op considerations  PT exam and course of treatment  Bladder and Bowel diary     Future Education To be Provided:     Visual Inspection of Perineum:   Excursion of perineal body in cephalad direction with contraction of pelvic floor muscles (PFM): weak and fair   Excursion of perineal body in caudal direction with relaxation of pelvic floor muscles (PFM): fair   Involuntary contraction with coughing: no  Involuntary relaxation with bearing down: no  Sensation: intact    Pelvic Organ Prolapse   no pelvic organ prolapse    Pelvic Floor Muscle Exam     Palpation   No increased muscle tension in the pubococcygeus, iIliococcygeus, obturator internus and periurethral  Moderate increased muscle tension in the bulbospongiosus, ischiocavernosus, super transverse perineal and puborectalis  No tenderness on right in the bulbospongiosus, ischiocavernosus, super transverse perineal, puborectalis, pubococcygeus, iliococcygeus, obturator internus and periurethral  No tenderness on left in the bulbospongiosus, ischiocavernosus, super transverse perineal, puborectalis, pubococcygeus, iliococcygeus, obturator internus and periurethral    Muscle Contraction: substitution  Pelvic floor muscle relaxation is complete  PERFECT Score   Power right: 1/5  Power left: 1/5        Assessment: Tolerated treatment well  Patient 's full assessment below  SHe does demonstrate some pelvic floor muscle weakness as well as some tightness/vaginal atrophy, especially on the right  She also had some compensation for her pelvic floor muscles with glutes and hip adductors due to PFM weakness  She did experience a moderate amount of leakage with going from supine to sit and sit to stand  She notes she typically experiences losses of urine with change in position  She will return for her next visit in one week  Work on further pelvic floor muscle strength and activation in supine on Biofeedback  Plan: Continue per plan of care        leakage Precautions: history of endometrial cancer, chemotherapy and radiation; history of R LE blood clots; R ureteral stent  [employer] HEP    Manuals 2/6 2/13 Neuro Re-Ed             Diaphragmatic Breathing             TA ADIM             TA + PFMC                          Biofeedback sEMG             PFMC: Slow Holds             PFMC: Quick Flicks             PFMC + hip abd isometrics             PFMC + hip add isometrics             PFMC + ecc bridge             PFMC in sitting             PFMC seated + tilt             PFMC in standing             PFMC in Quadruped                                                                 Ther Ex             PPT             Bridge + SLR             Bridge + marching                                                                              Ther Activity             EDUCATION  40 min incuding bladder log review and counseling           PFMC + reach             PFMC + squat             PFMC + lift             PFMC + sit to stand             PFMC + step up/down             Gait Training                                       Modalities

## 2023-02-16 ENCOUNTER — HOSPITAL ENCOUNTER (OUTPATIENT)
Dept: INFUSION CENTER | Facility: CLINIC | Age: 68
Discharge: HOME | End: 2023-02-16

## 2023-02-16 VITALS
DIASTOLIC BLOOD PRESSURE: 51 MMHG | SYSTOLIC BLOOD PRESSURE: 95 MMHG | RESPIRATION RATE: 16 BRPM | TEMPERATURE: 97.7 F | OXYGEN SATURATION: 92 % | HEART RATE: 73 BPM

## 2023-02-16 DIAGNOSIS — R79.0 ABNORMAL IRON SATURATION: ICD-10-CM

## 2023-02-16 DIAGNOSIS — D50.8 IRON DEFICIENCY ANEMIA SECONDARY TO INADEQUATE DIETARY IRON INTAKE: Primary | ICD-10-CM

## 2023-02-16 DIAGNOSIS — C54.1 ENDOMETRIAL CANCER (HCC): ICD-10-CM

## 2023-02-16 DIAGNOSIS — Z45.2 ENCOUNTER FOR CENTRAL LINE CARE: ICD-10-CM

## 2023-02-16 RX ORDER — SODIUM CHLORIDE 9 MG/ML
20 INJECTION, SOLUTION INTRAVENOUS ONCE
Status: CANCELLED | OUTPATIENT
Start: 2023-02-22

## 2023-02-16 RX ORDER — SODIUM CHLORIDE 9 MG/ML
20 INJECTION, SOLUTION INTRAVENOUS ONCE
Status: COMPLETED | OUTPATIENT
Start: 2023-02-16 | End: 2023-02-16

## 2023-02-16 RX ADMIN — SODIUM CHLORIDE 20 ML/HR: 0.9 INJECTION, SOLUTION INTRAVENOUS at 14:10

## 2023-02-16 RX ADMIN — SODIUM CHLORIDE 200 MG: 9 INJECTION, SOLUTION INTRAVENOUS at 14:20

## 2023-02-16 NOTE — PROGRESS NOTES
Patient to [facility] for Ven[facility]: Offers no complaints at present time: Lab work ( 01/28/23 ) reviewed: Ferritin - 467:  Within parameters to treat: Left PAC accessed without difficulty: Good blood return noted

## 2023-02-18 ENCOUNTER — PREP FOR PROCEDURE (OUTPATIENT)
Age: 68
End: 2023-02-18

## 2023-02-18 DIAGNOSIS — Z12.11 COLON CANCER SCREENING: Primary | ICD-10-CM

## 2023-02-18 NOTE — TELEPHONE ENCOUNTER
NP, last fc known, BMI 25     Tom Love     Scheduled DE 5/24  AMBER   Mailed PW to patient     Additional Information  • Negative: Is this a new patient under the age of 48? • Negative: Is this a patient over the age of 76 that has never had a colonoscopy? • Negative: Is this patient over the age of [de-identified] with a history of cancer and/or polyps? • Negative: Has the patient had a colonoscopy within the last year and when was it? • Affirmative: Does the patient have a family history of colon or rectal cancer? Mother  • Negative: Has the patient had a cardiac procedure within the last year? • Negative: Advised Patient - Initial Assessment  1  Patient advised that our office requires 72 hours notice for a cancellation of a procedure to avoid incurring a missed appointment fee  2  Asked patient to please contact insurance company to ask how they will be processing the individual claim for the procedure  3  Advised patient that she is welcome to see the doctor in the office prior to the procedure  4  Advised patient to be at the location of the procedure at 30 minutes prior to the scheduled time      Is the patient currently on any blood thinners such as Coumadin, Plavix, Eliquis, Pradaxa, Xarelto, etc?  (Check the patient's current medication list and document reason for taking medication)  Tom Love     Current Outpatient Medications:  amoxicillin (AMOXIL) 500 mg capsule, , Disp: , Rfl:   ARIPiprazole (ABILIFY) 20 MG tablet, Take 20 mg by mouth in the morning , Disp: , Rfl:   aspirin (ECOTRIN LOW STRENGTH) 81 mg EC tablet, Take 81 mg by mouth daily, Disp: , Rfl:   BD PosiFlush 0 9 % SOLN, , Disp: , Rfl:   Calcium-Magnesium-Vitamin D (CALCIUM 500 PO), Take by mouth daily , Disp: , Rfl:   cholecalciferol (VITAMIN D3) 1,000 units tablet, Take 4 tablets (4,000 Units total) by mouth daily, Disp: 90 tablet, Rfl: 0  clotrimazole-betamethasone (LOTRISONE) 1-0 05 % cream, Apply topically 2 (two) times a day (Patient not taking: Reported on 1/13/2023), Disp: 30 g, Rfl: 0  Cranberry-Vitamin C 250-60 MG CAPS, Take 1 tablet by mouth in the morning, Disp: 90 capsule, Rfl: 3  Cyanocobalamin (VITAMIN B12 PO), Take by mouth daily , Disp: , Rfl:   dronabinol (MARINOL) 2 5 mg capsule, Take 1 capsule (2 5 mg total) by mouth 2 (two) times a day before meals, Disp: 30 capsule, Rfl: 0  enoxaparin (LOVENOX) 80 mg/0 8 mL, INJECT 0 8ML (80MG TOTAL)  SUBCUTANEOUSLY EVERY 24  HOURS, Disp: 72 mL, Rfl: 1  ergocalciferol (VITAMIN D2) 50,000 units, Take 1 capsule (50,000 Units total) by mouth once a week for 6 doses (Patient not taking: Reported on 11/29/2022), Disp: 6 capsule, Rfl: 0  estradiol (ESTRACE VAGINAL) 0 1 mg/g vaginal cream, Insert 1g nightly for 6 weeks, then 1-2x weekly  (Patient not taking: Reported on 1/20/2023), Disp: 01 6 g, Rfl: 1  folic acid (FOLVITE) 1 mg tablet, Take 1 tablet (1 mg total) by mouth daily, Disp: , Rfl: 0  Gemtesa 75 MG TABS, TAKE 75 MG BY MOUTH IN THE MORNING, Disp: 90 tablet, Rfl: 2  lisinopril (ZESTRIL) 5 mg tablet, Take 1 tablet (5 mg total) by mouth daily, Disp: 90 tablet, Rfl: 3  LORazepam (ATIVAN) 0 5 mg tablet, Take 1 tablet (0 5 mg total) by mouth every 6 (six) hours as needed for anxiety (or nausea), Disp: 36 tablet, Rfl: 1  methylprednisolone (MEDROL) 4 mg tablet, Take 1 tablet (4 mg total) by mouth in the morning Take 2 tables once a day for 5 days, take 1 tablet once a day for 5 days, take 1 tablet every other day for 14 days  (Patient not taking: Reported on 1/20/2023), Disp: 21 tablet, Rfl: 0  Multiple Vitamin (MULTIVITAMIN) tablet, Take 1 tablet by mouth daily, Disp: , Rfl:   naloxone (NARCAN) 4 mg/0 1 mL nasal spray, Administer 1 spray into a nostril   If no response after 2-3 minutes, give another dose in the other nostril using a new spray , Disp: 1 each, Rfl: 0  omeprazole (PriLOSEC) 20 mg delayed release capsule, Take 20 mg by mouth daily, Disp: , Rfl:   ondansetron (ZOFRAN) 8 mg tablet, Take 1 tablet (8 mg total) by mouth every 8 (eight) hours as needed for nausea or vomiting, Disp: 30 tablet, Rfl: 1  oxybutynin (DITROPAN XL) 15 MG 24 hr tablet, Take 1 tablet (15 mg total) by mouth daily at bedtime, Disp: 90 tablet, Rfl: 3  rucaparib (RUBRACA) 300 mg tablet, Take 600 mg by mouth every 12 (twelve) hours Take with or without food  Do not repeat a vomited dose , Disp: , Rfl:   saccharomyces boulardii (FLORASTOR) 250 mg capsule, Take 1 capsule (250 mg total) by mouth 2 (two) times a day, Disp: , Rfl: 0  senna (SENOKOT) 8 6 MG tablet, Take 1 tablet (8 6 mg total) by mouth 2 (two) times a day as needed for constipation, Disp: 60 tablet, Rfl: 0  sodium chloride, PF, 0 9 %, 10 mL by Intracatheter route daily (Patient not taking: Reported on 1/13/2023), Disp: 300 mL, Rfl: 3  venlafaxine (EFFEXOR) 75 mg tablet, Take 75 mg by mouth 3 (three) times a day  , Disp: , Rfl:     No current facility-administered medications for this visit     Affirmative: Is the patient currently on any blood thinners such as Coumadin, Plavix, Eliquis, Pradaxa, Xarelto, etc?  (Check the patient's current medication list and document reason for taking medication)    Protocols used:  AMB [facility] COLONOSCOPY SCHEDULING

## 2023-02-20 ENCOUNTER — OFFICE VISIT (OUTPATIENT)
Dept: NEPHROLOGY | Facility: CLINIC | Age: 68
End: 2023-02-20

## 2023-02-20 ENCOUNTER — APPOINTMENT (OUTPATIENT)
Dept: PHYSICAL THERAPY | Facility: REHABILITATION | Age: 68
End: 2023-02-20

## 2023-02-20 VITALS
SYSTOLIC BLOOD PRESSURE: 103 MMHG | HEIGHT: 59 IN | WEIGHT: 126 LBS | BODY MASS INDEX: 25.4 KG/M2 | HEART RATE: 67 BPM | DIASTOLIC BLOOD PRESSURE: 62 MMHG

## 2023-02-20 DIAGNOSIS — D50.9 IRON DEFICIENCY ANEMIA, UNSPECIFIED IRON DEFICIENCY ANEMIA TYPE: ICD-10-CM

## 2023-02-20 DIAGNOSIS — E55.9 VITAMIN D INSUFFICIENCY: ICD-10-CM

## 2023-02-20 DIAGNOSIS — R80.9 NEPHROTIC RANGE PROTEINURIA: Primary | ICD-10-CM

## 2023-02-20 DIAGNOSIS — N13.30 HYDRONEPHROSIS OF RIGHT KIDNEY: ICD-10-CM

## 2023-02-20 DIAGNOSIS — N25.81 SECONDARY HYPERPARATHYROIDISM (HCC): ICD-10-CM

## 2023-02-20 DIAGNOSIS — N18.31 STAGE 3A CHRONIC KIDNEY DISEASE (HCC): ICD-10-CM

## 2023-02-20 NOTE — PROGRESS NOTES
NEPHROLOGY OFFICE VISIT   Brandy Phoenix Pulcini 74 y o  female MRN: [id number]  2/20/2023    Reason for Visit: Proteinuria    ASSESSMENT and PLAN:  It was a pleasure evaluating your patient in the office today  Thank you for allowing our team to participate in the care of Ms Ragini Melendez  Please do not hesitate to contact our team if further issues/questions shall arise in the interim       # Nephrotic range Proteinuria  >>Workup   - Urine albumin to creatinine ratio 150 milligram/gram in 2017  - 2+ proteinuria on dipstick in 2019   - Urine protein to creatinine ratio almost 1 g in 12/2020 before initiating Avastin  - Urine protein to creatinine ratio around 1-2 g since starting Avastin  - Urine protein to creatinine ratio most recently on the rise with a peak of 6 g on 10/01/2022  - Avastin was subsequently held after last dose on 09/09/2022  - Urine protein to creatinine ratio 3-7 g (11/2022-12/2022) after Avastin has been on hold   - Urinalysis with large blood, innumerable RBCs, innumerable WBCs, moderate bacteriuria in setting of right-sided nephroureteral stent  - Serum albumin 3 5, no evidence of hyperlipidemia  - No clinical evidence of nephrotic syndrome  - HbA1c 5 2, MARY ANNE 2 R-  - CHERI 1:80, dsDNA negative, no hypocomplementemia (no clinical evidence of lupus)  - Hep B/hep C/HIV negative     >>Etiology  - Worsening of proteinuria is related to use of VEGF inhibitor but she can have baseline glomerular pathology as proteinuria dates back to 2017  - Differential diagnosis includes proteinuria secondary to VEGF inhibitor (TMA) versus membranous nephropathy in setting of malignancy versus adaptive FSGS versus minimal-change disease     >> Plan  - She has not tolerated RAAS inhibition due to borderline low blood pressure and therefore would keep lisinopril on hold  - Given the fact that her right kidney is compromised (right-sided hydronephrosis with ureteral stent in place with atrophy) and most of her renal function is probably from the left side would keep a high threshold for kidney biopsy  - Avastin has been on hold and there are no plans to restart the drug at this time and would therefore continue conservative management without doing a kidney biopsy expecting improvement  - Repeat urine protein excretion in 1 month     # Recurrent stage I B endometrial cancer  - Diagnosis was made in 2017 and she underwent total abdominal hysterectomy and bilateral salpingo oophorectomy   - Status post ENDOBARR (rucaparib, atezolizumab, and bevacizumab) clinical trial started in 12/2020 and finished 12/2022  - She is currently under active surveillance     # Chronic Kidney Disease Stage III-A  - Etiology/risk factors: Hypertension, obstructive uropathy, underlying glomerular pathology, age-related nephron loss   - Baseline Cr: 0 9-1 1, recently 1 12 01/2023  - Urinalysis with large blood, innumerable RBCs, innumerable WBCs, moderate bacteriuria in setting of right-sided nephroureteral stent  - Proteinuria: As above   - Imaging: Right-sided hydronephrosis status post conversion of nephro ureterostomy tube or double-J ureteral stent, mild right renal atrophy 12/2022   - Discussed risk factor reduction to slow progression of chronic kidney disease  - Discussed avoidance of NSAIDs due to their short-term and long-term effects on kidney function     # Right hydronephrosis   - Secondary to endometrial carcinoma, status post right nephrostomy tube with later conversion to nephroureteral stent   - 09/2022: Successful exchange of 10 Irish 24 cm nephroureteral stent under fluoroscopic control, Catheter was capped  - 12/2022: Right nephroureteral stent was internalized to a double-J right stent  - She is currently scheduled for routine stent exchange with urology in March  - It appears that the stent is working well as her renal function remains stable    # Hypertension/Volume   - Goal BP <120/80 per KDIGO guidelines   - Volume status: Euvolemic  - Status: Blood pressure currently at goal  - Current antihypertensive regimen: Lisinopril is currently on hold due to borderline low blood pressure     # Anemia  - Target Hb: More than 10 g/dL  - Most recent hemoglobin: 9 4 g/dL  - Iron saturation 14, ferritin 467 01/2023  - Currently on IV iron supplementation with oncology  - No KENNEY given malignancy     # Electrolytes/Acid Base status   - Electrolytes/acid-base status currently stable     # CKD Mineral and Bone Disorder   - Goal Ca 8 5-10 mg/dL, goal Phos 2 7-4 6 mg/dL, goal iPTH 30-70 pg/mL  - She has an elevated PTH (secondary hyperparathyroidism) but also has vitamin-D insufficiency with level of around 25-27 despite being on vitamin-D supplementation with 2000 units daily  - Her vitamin D dose was increased to 4000 units by her PCP  - Mildly elevated serum phosphate but at goal for chronic kidney disease, discussed low phosphate diet    # Vitamin D insufficiency  - Most recent vitamin D level around 27, cholecalciferol dose increased to 4000 units by her PCP    # Other issues   - History of DVT on Lovenox  - History of gastric bypass in 2001    HPI:  Since last visit: Patient has been having low blood pressure and has stopped taking lisinopril after discussing with us  She was feeling fatigued and underwent lab test that showed iron deficiency anemia and has been started on IV iron  She otherwise feels well  She denies leg swelling  She denies dyspnea      Ragini Melendez is a 74 y  o  female who has history of endometrial cancer status post total abdominal hysterectomy and bilateral salpingo-oophorectomy, status post chemotherapy with Taxol and carboplatin in 2019, currently onrucaparib, atezolizumab, and bevacizumab  Alfredito Few was held on last 2 cycles due to worsening proteinuria  Jersey Todd has history of obesity for which he underwent gastric bypass surgery in 2001   She gained weight and was requiring metformin for prediabetes and quinapril for hypertension before she was diagnosed with endometrial cancer   Since diagnosis of endometrial cancer she has lost significant amount of weight; anti diabetic and antihypertensive medications were stopped        >> Major risk factors for CKD  - Diabetes: Previously pre-diabetic on Metformin  - Hypertension: Previous history of hypertension but not on any medications   - Age ? 57 years: Raiza Melody  - Family history of kidney disease: Father had kidney stones, his cousin was on dialysis   - Obesity or metabolic syndrome: Yes      >> Medical history evaluation   - Prior kidney disease or dialysis: No  - Incidental hematuria in the past: Yes with ureteral stent in place   - Urinary symptoms: Urinary incontinence   - History of foamy or frothy urine: No   - History of nephrolithiasis: Yes but never passed a stone   - Diseases that share risk factors with CKD: DM, HTN  - Systemic diseases that might affect kidney: No   - History of use of medications that might affect renal function: No    PATIENT INSTRUCTIONS:    Patient Instructions   Your blood pressure is on the lower side and would therefore keep lisinopril on hold  Please check your urine tests in 1 month and blood work in 3 months before next office visit in 3 months  OBJECTIVE:  Current Weight: Weight - Scale: 57 2 kg (126 lb)  Vitals:    02/20/23 1043   BP: 103/62   BP Location: Right arm   Patient Position: Sitting   Cuff Size: Standard   Pulse: 67   Weight: 57 2 kg (126 lb)   Height: 4' 10 5" (1 486 m)    Body mass index is 25 89 kg/m²  REVIEW OF SYSTEMS:    Review of Systems   Constitutional: Positive for fatigue  Negative for chills and fever  HENT: Negative for ear pain and sore throat  Eyes: Negative for pain and visual disturbance  Respiratory: Negative for cough and shortness of breath  Cardiovascular: Negative for chest pain and palpitations  Gastrointestinal: Negative for abdominal pain and vomiting     Genitourinary: Negative for dysuria and hematuria  Musculoskeletal: Negative for arthralgias and back pain  Skin: Negative for color change and rash  Neurological: Negative for seizures and syncope  All other systems reviewed and are negative        Past Medical History:   Diagnosis Date   • Anemia    • Chemotherapy follow-up examination    • Depression    • Endometrial cancer ([facility] [address] ) 12/2017   • History of chemotherapy     started 12/2021endometrial cancer   • Hyperglycemia     vx type 2 dm -- last assessed 4/1/14; resolved 11/7/17   • Hyperlipidemia    • Hypertension    • Obesity     last assessed 10/14/17; resolved 11/7/17       Past Surgical History:   Procedure Laterality Date   • ABDOMINAL SURGERY      GASTRIC BYPASS; 2001   • BREAST BIOPSY Right 06/28/2019    core biopsy; benign   • CHOLECYSTECTOMY      at the time of gastric bypass   • COLONOSCOPY     • CT NEEDLE BIOPSY LYMPH NODE  07/08/2019   • FL GUIDED CENTRAL VENOUS ACCESS DEVICE INSERTION  11/12/2019   • GASTRIC BYPASS     • HYSTERECTOMY Bilateral 2017    total abdominal with salpingo-oophorectomy   • IR NEPHROSTOMY TO NEPHROURETERAL STENT  06/09/2022   • IR NEPHROSTOMY TO NEPHROURETERAL STENT  12/20/2022   • IR NEPHROSTOMY TUBE CHECK/CHANGE/REPOSITION/REINSERTION/UPSIZE  05/27/2022   • IR NEPHROSTOMY TUBE PLACEMENT  07/26/2019   • IR NEPHROURETERAL STENT CHECK/CHANGE/REPOSITION  04/06/2021   • IR NEPHROURETERAL STENT CHECK/CHANGE/REPOSITION  06/04/2021   • IR NEPHROURETERAL STENT CHECK/CHANGE/REPOSITION  09/03/2021   • IR NEPHROURETERAL STENT CHECK/CHANGE/REPOSITION  12/07/2021   • IR NEPHROURETERAL STENT CHECK/CHANGE/REPOSITION  03/08/2022   • IR NEPHROURETERAL STENT CHECK/CHANGE/REPOSITION  05/10/2022   • IR NEPHROURETERAL STENT CHECK/CHANGE/REPOSITION  09/19/2022   • IR PICC LINE  09/27/2019   • IR PORT PLACEMENT  07/26/2019   • IR PORT REMOVAL  09/20/2019   • OOPHORECTOMY Bilateral 2017   • [place] INSJ TUNNELED CTR VAD W/SUBQ PORT AGE 7 YR/> Left 11/12/2019    Procedure: INSERTION VENOUS PORT ( PORT-A-CATH) IR;  Surgeon: Brad Brown DO;  [facility];  Service: Interventional Radiology   • SC LAPS ABD PRTM&OMENTUM DX W/WO SPEC BR/WA SPX N/A 12/19/2017    Procedure: LAPAROSCOPY DIAGNOSTIC;  Surgeon: Jeffry Boucher MD;  Location: [facility];  Service: Gynecology Oncology   • SC LAPS W/RAD HYST W/BILAT LMPHADEC RMVL TUBE/OVARY N/A 12/19/2017    Procedure: HYSTERECTOMY LAPAROSCOPIC TOTAL ([address]) W/ ROBOTICS; BILATERAL SALPINGOOPHERECTOMY; LYMPH NODE DISSECTION; lysis of adhesions;  Surgeon: Jeffry Boucher MD;  Location[facility];  Service: Gynecology Oncology   • TONSILLECTOMY     • US GUIDED BREAST BIOPSY RIGHT COMPLETE Right 06/28/2019       Family History   Problem Relation Age of Onset   • Hyperlipidemia Mother    • Heart disease Mother    • Ovarian cancer Mother 48   • Colon cancer Mother    • Lymphoma Father    • Breast cancer Sister 61   • No Known Problems Brother    • No Known Problems Brother    • No Known Problems Maternal Grandmother    • Bone cancer Maternal Grandfather    • Uterine cancer Paternal Grandmother    • No Known Problems Paternal Grandfather    • No Known Problems Maternal Aunt    • No Known Problems Maternal Aunt    • No Known Problems Maternal Aunt    • No Known Problems Maternal Aunt    • No Known Problems Paternal Aunt    • No Known Problems Paternal Aunt    • No Known Problems Paternal Aunt    • No Known Problems Paternal Aunt         Social History     Substance and Sexual Activity   Alcohol Use Not Currently     Social History     Substance and Sexual Activity   Drug Use No     Social History     Tobacco Use   Smoking Status Never   Smokeless Tobacco Never       PHYSICAL EXAM:      Physical Exam  Constitutional:       Appearance: Normal appearance  She is not toxic-appearing  HENT:      Head: Normocephalic and atraumatic  Mouth/Throat:      Mouth: Mucous membranes are moist       Pharynx: Oropharynx is clear     Cardiovascular: Rate and Rhythm: Normal rate and regular rhythm  Pulses: Normal pulses  Heart sounds: Normal heart sounds  Pulmonary:      Effort: Pulmonary effort is normal       Breath sounds: Normal breath sounds  Abdominal:      General: Bowel sounds are normal       Palpations: Abdomen is soft  Musculoskeletal:         General: Normal range of motion  Right lower leg: No edema  Left lower leg: No edema  Skin:     General: Skin is warm and dry  Neurological:      General: No focal deficit present  Mental Status: She is alert and oriented to person, place, and time  Mental status is at baseline  Psychiatric:         Mood and Affect: Mood normal          Behavior: Behavior normal          Thought Content:  Thought content normal          Judgment: Judgment normal          Medications:    Current Outpatient Medications:   •  ARIPiprazole (ABILIFY) 20 MG tablet, Take 20 mg by mouth in the morning , Disp: , Rfl:   •  aspirin (ECOTRIN LOW STRENGTH) 81 mg EC tablet, Take 81 mg by mouth daily, Disp: , Rfl:   •  Calcium-Magnesium-Vitamin D (CALCIUM 500 PO), Take by mouth daily , Disp: , Rfl:   •  cholecalciferol (VITAMIN D3) 1,000 units tablet, Take 4 tablets (4,000 Units total) by mouth daily, Disp: 90 tablet, Rfl: 0  •  Cranberry-Vitamin C 250-60 MG CAPS, Take 1 tablet by mouth in the morning, Disp: 90 capsule, Rfl: 3  •  Cyanocobalamin (VITAMIN B12 PO), Take by mouth daily , Disp: , Rfl:   •  dronabinol (MARINOL) 2 5 mg capsule, Take 1 capsule (2 5 mg total) by mouth 2 (two) times a day before meals, Disp: 30 capsule, Rfl: 0  •  enoxaparin (LOVENOX) 80 mg/0 8 mL, INJECT 0 8ML (80MG TOTAL)  SUBCUTANEOUSLY EVERY 24  HOURS, Disp: 72 mL, Rfl: 1  •  folic acid (FOLVITE) 1 mg tablet, Take 1 tablet (1 mg total) by mouth daily, Disp: , Rfl: 0  •  Gemtesa 75 MG TABS, TAKE 75 MG BY MOUTH IN THE MORNING, Disp: 90 tablet, Rfl: 2  •  LORazepam (ATIVAN) 0 5 mg tablet, Take 1 tablet (0 5 mg total) by mouth every 6 (six) hours as needed for anxiety (or nausea), Disp: 36 tablet, Rfl: 1  •  Multiple Vitamin (MULTIVITAMIN) tablet, Take 1 tablet by mouth daily, Disp: , Rfl:   •  naloxone (NARCAN) 4 mg/0 1 mL nasal spray, Administer 1 spray into a nostril  If no response after 2-3 minutes, give another dose in the other nostril using a new spray , Disp: 1 each, Rfl: 0  •  omeprazole (PriLOSEC) 20 mg delayed release capsule, Take 20 mg by mouth daily, Disp: , Rfl:   •  ondansetron (ZOFRAN) 8 mg tablet, Take 1 tablet (8 mg total) by mouth every 8 (eight) hours as needed for nausea or vomiting, Disp: 30 tablet, Rfl: 1  •  oxybutynin (DITROPAN XL) 15 MG 24 hr tablet, Take 1 tablet (15 mg total) by mouth daily at bedtime, Disp: 90 tablet, Rfl: 3  •  saccharomyces boulardii (FLORASTOR) 250 mg capsule, Take 1 capsule (250 mg total) by mouth 2 (two) times a day, Disp: , Rfl: 0  •  senna (SENOKOT) 8 6 MG tablet, Take 1 tablet (8 6 mg total) by mouth 2 (two) times a day as needed for constipation, Disp: 60 tablet, Rfl: 0  •  venlafaxine (EFFEXOR) 75 mg tablet, Take 75 mg by mouth 3 (three) times a day  , Disp: , Rfl:   •  amoxicillin (AMOXIL) 500 mg capsule, , Disp: , Rfl:   •  BD PosiFlush 0 9 % SOLN, , Disp: , Rfl:   •  clotrimazole-betamethasone (LOTRISONE) 1-0 05 % cream, Apply topically 2 (two) times a day (Patient not taking: Reported on 1/13/2023), Disp: 30 g, Rfl: 0  •  ergocalciferol (VITAMIN D2) 50,000 units, Take 1 capsule (50,000 Units total) by mouth once a week for 6 doses (Patient not taking: Reported on 11/29/2022), Disp: 6 capsule, Rfl: 0  •  estradiol (ESTRACE VAGINAL) 0 1 mg/g vaginal cream, Insert 1g nightly for 6 weeks, then 1-2x weekly   (Patient not taking: Reported on 1/20/2023), Disp: 42 5 g, Rfl: 1  •  lisinopril (ZESTRIL) 5 mg tablet, Take 1 tablet (5 mg total) by mouth daily (Patient not taking: Reported on 2/20/2023), Disp: 90 tablet, Rfl: 3  •  methylprednisolone (MEDROL) 4 mg tablet, Take 1 tablet (4 mg total) by mouth in the morning Take 2 tables once a day for 5 days, take 1 tablet once a day for 5 days, take 1 tablet every other day for 14 days  (Patient not taking: Reported on 1/20/2023), Disp: 21 tablet, Rfl: 0  •  rucaparib (RUBRACA) 300 mg tablet, Take 600 mg by mouth every 12 (twelve) hours Take with or without food  Do not repeat a vomited dose   (Patient not taking: Reported on 2/20/2023), Disp: , Rfl:   •  sodium chloride, PF, 0 9 %, 10 mL by Intracatheter route daily (Patient not taking: Reported on 1/13/2023), Disp: 300 mL, Rfl: 3    Laboratory Results:        Invalid input(s): ALBUMIN    Results for orders placed or performed in visit on 01/28/23   Urine culture    Specimen: Urine   Result Value Ref Range    Urine Culture 30,000-39,000 cfu/ml    CBC and Platelet   Result Value Ref Range    WBC 5 41 4 31 - 10 16 Thousand/uL    RBC 3 17 (L) 3 81 - 5 12 Million/uL    Hemoglobin 9 4 (L) 11 5 - 15 4 g/dL    Hematocrit 30 7 (L) 34 8 - 46 1 %    MCV 97 82 - 98 fL    MCH 29 7 26 8 - 34 3 pg    MCHC 30 6 (L) 31 4 - 37 4 g/dL    RDW 13 9 11 6 - 15 1 %    Platelets 169 984 - 307 Thousands/uL    MPV 9 6 8 9 - 12 7 fL   Comprehensive metabolic panel   Result Value Ref Range    Sodium 138 135 - 147 mmol/L    Potassium 4 3 3 5 - 5 3 mmol/L    Chloride 109 (H) 96 - 108 mmol/L    CO2 23 21 - 32 mmol/L    ANION GAP 6 4 - 13 mmol/L    BUN 29 (H) 5 - 25 mg/dL    Creatinine 1 12 0 60 - 1 30 mg/dL    Glucose, Fasting 86 65 - 99 mg/dL    Calcium 8 8 8 4 - 10 2 mg/dL    AST 8 (L) 13 - 39 U/L    ALT 8 7 - 52 U/L    Alkaline Phosphatase 108 (H) 34 - 104 U/L    Total Protein 6 8 6 4 - 8 4 g/dL    Albumin 3 5 3 5 - 5 0 g/dL    Total Bilirubin 0 23 0 20 - 1 00 mg/dL    eGFR 51 ml/min/1 73sq m   Zinc   Result Value Ref Range    Zinc 52 44 - 115 ug/dL   Vitamin D 25 hydroxy   Result Value Ref Range    Vit D, 25-Hydroxy 27 4 (L) 30 0 - 100 0 ng/mL   Vitamin B12   Result Value Ref Range    Vitamin B-12 411 100 - 900 pg/mL   Vitamin B1, whole blood   Result Value Ref Range    Vitamin B1, Whole Blood 85 4 66 5 - 200 0 nmol/L   Vitamin A   Result Value Ref Range    Vitamin A 11 9 (L) 22 0 - 69 5 ug/dL   PTH, intact   Result Value Ref Range     6 (H) 18 4 - 80 1 pg/mL   Iron Saturation %   Result Value Ref Range    Iron Saturation 14 (L) 15 - 50 %    TIBC 224 (L) 250 - 450 ug/dL    Iron 31 (L) 50 - 170 ug/dL   Ferritin   Result Value Ref Range    Ferritin 467 (H) 8 - 388 ng/mL   Lipid panel   Result Value Ref Range    Cholesterol 184 See Comment mg/dL    Triglycerides 82 See Comment mg/dL    HDL, Direct 64 >=50 mg/dL    LDL Calculated 104 (H) 0 - 100 mg/dL    Non-HDL-Chol (CHOL-HDL) 120 mg/dl   Phosphorus   Result Value Ref Range    Phosphorus 4 2 (H) 2 3 - 4 1 mg/dL     *Note: Due to a large number of results and/or encounters for the requested time period, some results have not been displayed  A complete set of results can be found in Results Review

## 2023-02-20 NOTE — PATIENT INSTRUCTIONS
Your blood pressure is on the lower side and would therefore keep lisinopril on hold  Please check your urine tests in 1 month and blood work in 3 months before next office visit in 3 months

## 2023-02-22 ENCOUNTER — PATIENT MESSAGE (OUTPATIENT)
Dept: UROLOGY | Facility: AMBULATORY SURGERY CENTER | Age: 68
End: 2023-02-22

## 2023-02-22 ENCOUNTER — HOSPITAL ENCOUNTER (OUTPATIENT)
Dept: INFUSION CENTER | Facility: HOSPITAL | Age: 68
Discharge: HOME | End: 2023-02-22
Attending: INTERNAL MEDICINE

## 2023-02-24 ENCOUNTER — HOSPITAL ENCOUNTER (OUTPATIENT)
Dept: INFUSION CENTER | Facility: HOSPITAL | Age: 68
End: 2023-02-24
Attending: INTERNAL MEDICINE

## 2023-02-24 VITALS
DIASTOLIC BLOOD PRESSURE: 64 MMHG | RESPIRATION RATE: 18 BRPM | TEMPERATURE: 97 F | HEART RATE: 79 BPM | SYSTOLIC BLOOD PRESSURE: 99 MMHG

## 2023-02-24 DIAGNOSIS — Z45.2 ENCOUNTER FOR CENTRAL LINE CARE: ICD-10-CM

## 2023-02-24 DIAGNOSIS — C54.1 ENDOMETRIAL CANCER (HCC): ICD-10-CM

## 2023-02-24 DIAGNOSIS — D50.8 IRON DEFICIENCY ANEMIA SECONDARY TO INADEQUATE DIETARY IRON INTAKE: Primary | ICD-10-CM

## 2023-02-24 DIAGNOSIS — R79.0 ABNORMAL IRON SATURATION: ICD-10-CM

## 2023-02-24 RX ORDER — SODIUM CHLORIDE 9 MG/ML
20 INJECTION, SOLUTION INTRAVENOUS ONCE
Status: COMPLETED | OUTPATIENT
Start: 2023-02-24 | End: 2023-02-24

## 2023-02-24 RX ORDER — SODIUM CHLORIDE 9 MG/ML
20 INJECTION, SOLUTION INTRAVENOUS ONCE
Status: CANCELLED | OUTPATIENT
Start: 2023-03-02

## 2023-02-24 RX ADMIN — SODIUM CHLORIDE 20 ML/HR: 0.9 INJECTION, SOLUTION INTRAVENOUS at 11:39

## 2023-02-24 RX ADMIN — IRON SUCROSE 200 MG: 20 INJECTION, SOLUTION INTRAVENOUS at 11:39

## 2023-02-27 ENCOUNTER — OFFICE VISIT (OUTPATIENT)
Dept: PHYSICAL THERAPY | Facility: REHABILITATION | Age: 68
End: 2023-02-27

## 2023-02-27 DIAGNOSIS — R39.15 URINARY URGENCY: ICD-10-CM

## 2023-02-27 DIAGNOSIS — R32 INCONTINENCE IN FEMALE: Primary | ICD-10-CM

## 2023-02-27 NOTE — PROGRESS NOTES
Daily Note     Today's date: 2023  Patient name: Aury Sim  : 1955  MRN: [id number]  Referring provider: RAFITA Mcconnell  Dx:   Encounter Diagnosis     ICD-10-CM    1  Incontinence in female  R32       2  Urinary urgency  R39 15           Start Time: 1000  Stop Time: 1100  Total time in clinic (min): 60 minutes    Subjective: The patient has no new complaints since her last visit  She does continue to experience incontinence and urgency of her bladder  Objective: See treatment diary below      Assessment: Tolerated treatment well  Patient worked on [employer] today  She demonstrated good resting rate, it was WNL  She did have some difficulty with isolating her pelvic floor muscles initially  She was compensating with her glutes and hip adductors  She was able to isolate her pelvic floor with cueing and correction, although she had limited recruitment  She was able to practice in supine and sitting  She was given HEP for home today consisting of isolation and awareness training exercises, a few repetitions, a few times a day  Did also work on supine to sit transfers for better body mechanics and to help prevent leakage  She was encouraged to call with any questions or concerns  Plan: Continue per plan of care        leakage Precautions: history of endometrial cancer, chemotherapy and radiation; history of R LE blood clots; R ureteral stent  [employer] HEP    Manuals                                                               Neuro Re-Ed             Diaphragmatic Breathing             TA ADIM             TA + PFMC                          Biofeedback sEMG   30 min          PFMC: Slow Holds             PFMC: Quick Flicks   3Q70  supine          PFMC + hip abd isometrics             PFMC + hip add isometrics             PFMC + ecc bridge             PFMC in sitting   10x           PFMC seated + tilt             PFMC in standing             PFMC in Quadruped Ther Ex             PPT             Bridge + SLR             Bridge + marching                                                                              Ther Activity             EDUCATION  40 min incuding bladder log review and counseling 25 min          PFMC + reach             PFMC + squat             PFMC + lift             PFMC + sit to stand             PFMC + step up/down             Gait Training                                       Modalities

## 2023-02-28 ENCOUNTER — TELEPHONE (OUTPATIENT)
Dept: NUTRITION | Facility: CLINIC | Age: 68
End: 2023-02-28

## 2023-02-28 NOTE — TELEPHONE ENCOUNTER
Call received from pt stating she needs to reschedule her RD follow up for Monday d/t an appt conflict    Appt rescheduled to 3/17 at 11am

## 2023-03-02 ENCOUNTER — HOSPITAL ENCOUNTER (OUTPATIENT)
Dept: INFUSION CENTER | Facility: HOSPITAL | Age: 68
Discharge: HOME | End: 2023-03-02
Attending: INTERNAL MEDICINE

## 2023-03-02 DIAGNOSIS — D50.8 IRON DEFICIENCY ANEMIA SECONDARY TO INADEQUATE DIETARY IRON INTAKE: Primary | ICD-10-CM

## 2023-03-02 DIAGNOSIS — R79.0 ABNORMAL IRON SATURATION: ICD-10-CM

## 2023-03-02 DIAGNOSIS — C54.1 ENDOMETRIAL CANCER (HCC): ICD-10-CM

## 2023-03-02 DIAGNOSIS — Z45.2 ENCOUNTER FOR CENTRAL LINE CARE: ICD-10-CM

## 2023-03-02 RX ORDER — SODIUM CHLORIDE 9 MG/ML
20 INJECTION, SOLUTION INTRAVENOUS ONCE
Status: COMPLETED | OUTPATIENT
Start: 2023-03-02 | End: 2023-03-02

## 2023-03-02 RX ORDER — SODIUM CHLORIDE 9 MG/ML
20 INJECTION, SOLUTION INTRAVENOUS ONCE
Status: CANCELLED | OUTPATIENT
Start: 2023-03-10

## 2023-03-02 RX ADMIN — SODIUM CHLORIDE 20 ML/HR: 0.9 INJECTION, SOLUTION INTRAVENOUS at 08:47

## 2023-03-02 RX ADMIN — IRON SUCROSE 200 MG: 20 INJECTION, SOLUTION INTRAVENOUS at 08:47

## 2023-03-03 ENCOUNTER — TELEPHONE (OUTPATIENT)
Dept: UROLOGY | Facility: AMBULATORY SURGERY CENTER | Age: 68
End: 2023-03-03

## 2023-03-03 NOTE — TELEPHONE ENCOUNTER
I called pt to inform her that I apologized that I was not aware of her surgical case until recently due to not being informed by the provider who ordered the case  There was no answer when I called so I did leave a voicemail and informed her that I was holding 3/30 at [facility] for her procedure  I asked her to call our office back on Monday to confirm if this worked for her

## 2023-03-07 NOTE — PROGRESS NOTES
Outpatient Oncology Nutrition Consult  Type of Consult: Follow Up  Care Location: Telephone Call   Nutrition Assessment:  Oncology Diagnosis & Treatments:   Endometrial cancer  S/p surgery 12/19/17  Recurrence 7/8/19  S/p chemotherapy (carboplatin/taxol from 7/30/19-1/6/20)  S/p whole pelvis RT (2/4/20- 4/13/20)  Disease progression  S/p ENDOBAR trial 12/21/20-12/5/22    Oncology History   Endometrial cancer ([facility] [address] )   11/17/2017 Initial Diagnosis    Endometrial cancer ([facility] [address] )     11/17/2017 Biopsy    ENDOMETRIAL BIOPSY: WELL DIFFERENTIATED ENDOMETRIAL ADENOCARCINOMA (FIGO I) WITH FOCALMUCINOUS FEATURES  Part B: ENDOCERVICAL POLYP:BENIGN ENDOCERVICAL      12/19/2017 Surgery    Robotic assisted total laparoscopic hysterectomy with bilateral salpingo-oophorectomy and sentinel bilateral pelvic lymph node dissection  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 (4 4 x 3 2 cm tumor, 9 4/15 4mm invasion, NO LVSI, washings revealed atypical cellular changes)     12/19/2017 Genetic Testing    Morrison testing negative     6/28/2019 Biopsy    A  Breast, Right, US BX Right Breast 1000 4 cmfn:  - Benign breast tissue with focal histiocytic aggregate  See comment   - Negative for atypia and in-situ or invasive carcinoma  7/8/2019 Recurrence    Presented with right lower extremity DVT and CT demonstrating right pelvic sidewall mass with venous, ureteral and nerve compression causing significant neuropathic pain  Biopsy:  Lymph Node, Right pelvic lymph node x3:  - High-grade adenocarcinoma  7/30/2019 - 1/6/2020 Chemotherapy    Taxol 175 mg/m2 and carboplatin AUC 6 every 21 days  Dose was reduced to taxol 135 mg/m2 and carboplatin AUC 5  Completed 6 cycles  Treatment protracted due to multiple hospital admissions       2/24/2020 - 4/13/2020 Radiation    adjuvant external beam radiation therapy to the whole pelvis to 4500 cGy followed by boost to gross disease of an additional 2200 cGy     11/23/2020 Progression    New necrotic adenopathy in the retroperitoneum on CT      12/21/2020 -  Chemotherapy    Began chemotherapy with rucaparib, atezolizumab, and bevacizumab as per [facility] clinical trial     Rucaparib held cycle 28 secondary to fatigue  Avastin held since cycle 30 for elevated UPC  Treatment held for one cycle after cycle 31 for mucositis/fatigue         PMH: RNYGB (2001 at [address])    Past Medical History:   Diagnosis Date   • Anemia    • Chemotherapy follow-up examination    • Depression    • Endometrial cancer ([facility] [address] ) 12/2017   • History of chemotherapy     started 12/2021endometrial cancer   • Hyperglycemia     vx type 2 dm -- last assessed 4/1/14; resolved 11/7/17   • Hyperlipidemia    • Hypertension    • Obesity     last assessed 10/14/17; resolved 11/7/17     Past Surgical History:   Procedure Laterality Date   • ABDOMINAL SURGERY      GASTRIC BYPASS; 2001   • BREAST BIOPSY Right 06/28/2019    core biopsy; benign   • CHOLECYSTECTOMY      at the time of gastric bypass   • COLONOSCOPY     • CT NEEDLE BIOPSY LYMPH NODE  07/08/2019   • FL GUIDED CENTRAL VENOUS ACCESS DEVICE INSERTION  11/12/2019   • GASTRIC BYPASS     • HYSTERECTOMY Bilateral 2017    total abdominal with salpingo-oophorectomy   • IR NEPHROSTOMY TO NEPHROURETERAL STENT  06/09/2022   • IR NEPHROSTOMY TO NEPHROURETERAL STENT  12/20/2022   • IR NEPHROSTOMY TUBE CHECK/CHANGE/REPOSITION/REINSERTION/UPSIZE  05/27/2022   • IR NEPHROSTOMY TUBE PLACEMENT  07/26/2019   • IR NEPHROURETERAL STENT CHECK/CHANGE/REPOSITION  04/06/2021   • IR NEPHROURETERAL STENT CHECK/CHANGE/REPOSITION  06/04/2021   • IR NEPHROURETERAL STENT CHECK/CHANGE/REPOSITION  09/03/2021   • IR NEPHROURETERAL STENT CHECK/CHANGE/REPOSITION  12/07/2021   • IR NEPHROURETERAL STENT CHECK/CHANGE/REPOSITION  03/08/2022   • IR NEPHROURETERAL STENT CHECK/CHANGE/REPOSITION  05/10/2022   • IR NEPHROURETERAL STENT CHECK/CHANGE/REPOSITION  09/19/2022   • IR PICC LINE  09/27/2019   • IR PORT PLACEMENT  07/26/2019   • IR PORT REMOVAL  09/20/2019   • OOPHORECTOMY Bilateral 2017   • CT INSJ TUNNELED CTR VAD W/SUBQ PORT AGE 5 YR/> Left 11/12/2019    Procedure: INSERTION VENOUS PORT ( PORT-A-CATH) IR;  Surgeon: Dami Gardner DO;  [facility];  Service: Interventional Radiology   • CT LAPS ABD PRTM&OMENTUM DX W/WO SPEC BR/WA SPX N/A 12/19/2017    Procedure: LAPAROSCOPY DIAGNOSTIC;  Surgeon: Torey Alarcon MD;  Location: [facility];  Service: Gynecology Oncology   • CT LAPS W/RAD HYST W/BILAT LMPHADEC RMVL TUBE/OVARY N/A 12/19/2017    Procedure: HYSTERECTOMY LAPAROSCOPIC TOTAL ([address]) W/ ROBOTICS; BILATERAL SALPINGOOPHERECTOMY; LYMPH NODE DISSECTION; lysis of adhesions;  Surgeon: Torey Alarcon MD;  Location: [facility];  Service: Gynecology Oncology   • TONSILLECTOMY     • US GUIDED BREAST BIOPSY RIGHT COMPLETE Right 06/28/2019       Review of Medications:   Vitamins, Supplements and Herbals: yes: Hx RNYGB regimen: Vitamin D, Folic Acid, Align, calcium citrate TID, B12 1000 mcg QD, MVI BID (MVI does not have iron);  Iron 65 mg QD (2 hrs seperate from calcium)    Current Outpatient Medications:   •  amoxicillin (AMOXIL) 500 mg capsule, , Disp: , Rfl:   •  ARIPiprazole (ABILIFY) 20 MG tablet, Take 20 mg by mouth in the morning , Disp: , Rfl:   •  aspirin (ECOTRIN LOW STRENGTH) 81 mg EC tablet, Take 81 mg by mouth daily, Disp: , Rfl:   •  BD PosiFlush 0 9 % SOLN, , Disp: , Rfl:   •  Calcium-Magnesium-Vitamin D (CALCIUM 500 PO), Take by mouth daily , Disp: , Rfl:   •  cholecalciferol (VITAMIN D3) 1,000 units tablet, Take 4 tablets (4,000 Units total) by mouth daily, Disp: 90 tablet, Rfl: 0  •  clotrimazole-betamethasone (LOTRISONE) 1-0 05 % cream, Apply topically 2 (two) times a day (Patient not taking: Reported on 1/13/2023), Disp: 30 g, Rfl: 0  •  Cranberry-Vitamin C 250-60 MG CAPS, Take 1 tablet by mouth in the morning, Disp: 90 capsule, Rfl: 3  •  Cyanocobalamin (VITAMIN B12 PO), Take by mouth daily , Disp: , Rfl:   •  dronabinol (MARINOL) 2 5 mg capsule, Take 1 capsule (2 5 mg total) by mouth 2 (two) times a day before meals, Disp: 30 capsule, Rfl: 0  •  enoxaparin (LOVENOX) 80 mg/0 8 mL, INJECT 0 8ML (80MG TOTAL)  SUBCUTANEOUSLY EVERY 24  HOURS, Disp: 72 mL, Rfl: 1  •  ergocalciferol (VITAMIN D2) 50,000 units, Take 1 capsule (50,000 Units total) by mouth once a week for 6 doses (Patient not taking: Reported on 11/29/2022), Disp: 6 capsule, Rfl: 0  •  estradiol (ESTRACE VAGINAL) 0 1 mg/g vaginal cream, Insert 1g nightly for 6 weeks, then 1-2x weekly  (Patient not taking: Reported on 1/20/2023), Disp: 42 5 g, Rfl: 1  •  folic acid (FOLVITE) 1 mg tablet, Take 1 tablet (1 mg total) by mouth daily, Disp: , Rfl: 0  •  Gemtesa 75 MG TABS, TAKE 75 MG BY MOUTH IN THE MORNING, Disp: 90 tablet, Rfl: 2  •  lisinopril (ZESTRIL) 5 mg tablet, Take 1 tablet (5 mg total) by mouth daily (Patient not taking: Reported on 2/20/2023), Disp: 90 tablet, Rfl: 3  •  LORazepam (ATIVAN) 0 5 mg tablet, Take 1 tablet (0 5 mg total) by mouth every 6 (six) hours as needed for anxiety (or nausea), Disp: 36 tablet, Rfl: 1  •  methylprednisolone (MEDROL) 4 mg tablet, Take 1 tablet (4 mg total) by mouth in the morning Take 2 tables once a day for 5 days, take 1 tablet once a day for 5 days, take 1 tablet every other day for 14 days  (Patient not taking: Reported on 1/20/2023), Disp: 21 tablet, Rfl: 0  •  Multiple Vitamin (MULTIVITAMIN) tablet, Take 1 tablet by mouth daily, Disp: , Rfl:   •  naloxone (NARCAN) 4 mg/0 1 mL nasal spray, Administer 1 spray into a nostril   If no response after 2-3 minutes, give another dose in the other nostril using a new spray , Disp: 1 each, Rfl: 0  •  omeprazole (PriLOSEC) 20 mg delayed release capsule, Take 20 mg by mouth daily, Disp: , Rfl:   •  ondansetron (ZOFRAN) 8 mg tablet, Take 1 tablet (8 mg total) by mouth every 8 (eight) hours as needed for nausea or vomiting, Disp: 30 tablet, Rfl: 1  • oxybutynin (DITROPAN XL) 15 MG 24 hr tablet, Take 1 tablet (15 mg total) by mouth daily at bedtime, Disp: 90 tablet, Rfl: 3  •  rucaparib (RUBRACA) 300 mg tablet, Take 600 mg by mouth every 12 (twelve) hours Take with or without food  Do not repeat a vomited dose  (Patient not taking: Reported on 2/20/2023), Disp: , Rfl:   •  saccharomyces boulardii (FLORASTOR) 250 mg capsule, Take 1 capsule (250 mg total) by mouth 2 (two) times a day, Disp: , Rfl: 0  •  senna (SENOKOT) 8 6 MG tablet, Take 1 tablet (8 6 mg total) by mouth 2 (two) times a day as needed for constipation, Disp: 60 tablet, Rfl: 0  •  sodium chloride, PF, 0 9 %, 10 mL by Intracatheter route daily (Patient not taking: Reported on 1/13/2023), Disp: 300 mL, Rfl: 3  •  venlafaxine (EFFEXOR) 75 mg tablet, Take 75 mg by mouth 3 (three) times a day  , Disp: , Rfl:   No current facility-administered medications for this visit      Most Recent Lab Results:  Lab Results   Component Value Date    WBC 5 41 01/28/2023    IRON 31 (L) 01/28/2023    TIBC 224 (L) 01/28/2023    FERRITIN 467 (H) 01/28/2023    CHOLESTEROL 184 01/28/2023    CHOL 183 10/27/2017    TRIG 82 01/28/2023    HDL 64 01/28/2023    LDLCALC 104 (H) 01/28/2023    ALT 8 01/28/2023    AST 8 (L) 01/28/2023    ALB 3 5 01/28/2023     10/27/2017     05/12/2017    SODIUM 138 01/28/2023    SODIUM 138 12/30/2022    K 4 3 01/28/2023    K 4 2 12/30/2022     (H) 01/28/2023    BUN 29 (H) 01/28/2023    BUN 25 12/30/2022    CREATININE 1 12 01/28/2023    CREATININE 1 13 12/30/2022    EGFR 51 01/28/2023    PHOS 4 2 (H) 01/28/2023    PHOS 4 2 (H) 12/30/2022    GLUCOSE 219 (H) 12/19/2017    POCGLU 97 08/18/2020    GLUF 86 01/28/2023    GLUF 86 12/30/2022    GLUC 86 12/30/2022    HGBA1C 5 2 10/31/2022    HGBA1C 4 8 07/07/2021    HGBA1C 5 1 02/03/2020    CALCIUM 8 8 01/28/2023    FOLATE 5 4 10/06/2019    MG 1 7 (L) 12/30/2022     Anthropometric Measurements:   Height: 59"  Ht Readings from Last 1 Encounters:   02/20/23 4' 10 5" (1 486 m)     Wt Readings from Last 20 Encounters:   02/20/23 57 2 kg (126 lb)   02/08/23 55 3 kg (122 lb)   02/06/23 54 4 kg (120 lb)   02/03/23 53 8 kg (118 lb 8 oz)   02/01/23 55 1 kg (121 lb 8 oz)   01/30/23 55 2 kg (121 lb 11 1 oz)   01/20/23 54 9 kg (121 lb)   01/16/23 55 3 kg (122 lb)   01/13/23 54 7 kg (120 lb 8 oz)   01/06/23 54 kg (119 lb)   12/30/22 54 2 kg (119 lb 8 oz)   12/20/22 52 2 kg (115 lb)   12/16/22 52 2 kg (115 lb)   12/13/22 52 2 kg (115 lb)   12/05/22 53 3 kg (117 lb 8 oz)   12/02/22 54 2 kg (119 lb 8 oz)   11/29/22 54 1 kg (119 lb 3 2 oz)   11/14/22 55 kg (121 lb 4 1 oz)   11/11/22 54 9 kg (121 lb)   10/27/22 56 2 kg (123 lb 12 8 oz)     Weight Hx:  Usual Weight: 270# before RNYGB in 2001; lowest post-op wt: 200#; wt typically fluctuates between 215-230# (prior to wt loss)  Varian: (2/25/20) 185 6#, (3/3/20) 188 6#, (3/10/20) 187 2#, (3/19/20) 186 2#, (3/24/20) 187#, (3/31/20) 185 4#, (4/10/20) 184 4#    Home Scale Wts: (2/19/20) 185#, (8/3/20) 194#, (3/16/23 - on infusion scale pt weighed herself) 126#    Oncology Nutrition-Anthropometrics    Flowsheet Row Nutrition from 3/17/2023 in [address] [place] [address] Oncology [facility] Services Nutrition from 2/6/2023 in [address] [place] [address] Oncology Dietitian Services   Patient [age] (years): [age] years   Patient (female) height (in): 61 in 61 in   Current Weight to be used for anthropometric calculations (lbs) 126 lbs 120 lbs   Current Weight to be used for anthropometric calculations (kg) 57 3 kg 54 5 kg   BMI: 25 4 24 2   IBW female: 95 lbs 95 lbs   IBW (kg) female: 43 2 kg 43 2 kg   IBW % (female) 132 6 % 126 3 %   Adjusted BW (female): 102 8 lbs 101 3 lbs   Adjusted BW kg (female): 46 7 kg 46 kg   % weight change after 1 week: -- -1 4 %   Weight change after 1 week (lbs) -- -1 7 lbs   % weight change after 1 month: 0 % 0 8 %   Weight change after 1 month (lbs) 0 lbs 1 lbs   % weight change after 3 months: 9 6 % -0 8 %   Weight change after 3 months (lbs) 11 lbs -1 lbs        Nutrition-Focused Physical Findings:  n/a due to telephone call     Food/Nutrition-Related History & Client/Social History:    Current Nutrition Impact Symptoms:   [] Nausea  [] Reduced Appetite  [] Acid Reflux   [] Vomiting  [] Unintended Wt Loss - hx  -pt does not want to regain the wt that she lost [x] Malabsorption - S/p RNYGB in 2001   [x] Diarrhea - fluctuating between diarrhea/constipation, "no normal BM"  -diarrhea occurs after taking senna or lactulose [] Unintended Wt Gain  [] Dumping Syndrome   [x] Constipation - fluctuating between diarrhea/constipation, "no normal BM"  -inconsistent with apple/prune sauce (which has been effective in the past) [] Thick Mucous/Secretions  [] Abdominal Pain    [] Dysgeusia (Altered Taste)   [] Xerostomia (Dry Mouth)  [] Gas    [] Dysosmia (Altered Smell)  [] Thrush  [] Difficulty Chewing    [] Oral Mucositis (Sore Mouth)  [] Fatigue  [] Pain:   [] Odynophagia   [] Esophagitis  [] Other:    [] Dysphagia [] Early Satiety  [] No Problems Eating      Food Allergies & Intolerances: yes: had skin patch testing which showed allergy to strawberries, but says she is able to eat strawberries without any side effects    Current Diet: Regular Diet, No Restrictions  Current Nutrition Intake: Increased since last visit  Appetite: Good   Nutrition Route: PO   Activity level: Sedentary  Limited by pain and medical condition      24 Hr Diet Recall:   Breakfast: cinnamon raisin English muffin with canola oil butter and fruit, sometimes with pb  Snack: none  Lunch: soup or salad or hot dog or sandwich (ham or salami) on white bread; yesterday: marie flat bread pizza from [employer]: jello with whipped topping or grapes and cheese  Dinner: has been doing a lot of take out d/t constipation, last night: 2 hot dogs on a roll  Snack: celery with pb or Tostitos with salsa and cheese Beverages: water (16 9 oz x2-3), sweetened iced tea (32 oz x1)   -Does not drink coffee or alcohol   -Does not separate fluids from solids, does not have discomfort while eating and drinking at the same time  Supplements:   [employer] (1yhn=360 kcal, 5 g pro) mixed with [employer] milk - as needed     Oncology Nutrition-Estimated Needs    Flowsheet Row Nutrition from 3/28/2022 in [place] [address] Oncology Dietitian Services Nutrition from 10/11/2021 in [place] [address] Oncology Dietitian Services   Weight type used Actual weight Adjusted weight   Weight in kilograms (kg) used for estimated needs 54 1 kg 49 5 kg   Energy needs formula:  35-40 kcal/kg 35-40 kcal/kg   Energy needs based on 35 kcal/k kcal 1733 kcal   Energy needs based on 40 kcal/k kcal 1980 kcal   Protein needs formula: 1 5-2 g/kg 1 5-2 g/kg   Protein needs based on 1 5 g/kg 81 g 74 g   Protein needs based on 2 g/kg 108 g 99 g   Fluid needs formula: 30-35 mL/kg 30-35 mL/kg   Fluid needs based on 30 mL/kg 1623 mL 1485 mL   Fluid needs in ounces 55 oz 50 oz   Fluid needs based on 35 mL/kg 1894 mL 1733 mL   Fluid needs in ounces 64 oz 59 oz        Discussion & Intervention:    Ragini was evaluated today for an RD follow up regarding nutrition impact sx management, weight management and cancer-preventative eating  Ragini has completed tx for endometrial cancer  She is doing well today  She is maintaining her wt  Her appetite has returned  She has been struggling with constipation  She says she will take senna and lactulose then have diarrhea  She is using the apple/prune sauce but says she has been inconsistent with it  Reviewed 24 hour recall, which revealed an adequate po intake, and discussed ways to transition to a cancer preventative diet, balance energy intake to promote a healthy body weight and optimize nutrient intake to promote healing    Also reviewed the importance of wt management throughout the healing process and the role of a cancer preventative diet in managing wt and overall health  Based on today's assessment, discussion included: MNT for: wt management, constipation, choosing a variety of colorful, plant-based foods to support a cancer preventative diet, a higher fiber diet & food choices to include at all meals & snacks, adequate hydration & fluid choices, eating smaller more frequent meals every 2-3 hours (5-6 small meals/day), recipe suggestions/resources, trying new recipes, & cooking at home more often and balancing energy intake with physical activity  Moving forward, Ragini was encouraged to transition to a cancer-preventative lifestyle and practice MNT for nutrition impact sx management  She does best with self-directed goals and has chosen goals for herself moving forward; RD guidance provided during goal setting  Materials Provided: not applicable  All questions and concerns addressed during today’s visit  Ragini has RD contact information  Nutrition Diagnosis:  Increased Nutrient Needs (kcal & pro) related to increased demand for nutrients and disease state as evidenced by recovering from cancer tx  Altered GI Function related to alteration in GI tract motility as evidenced by Constipation  Monitoring & Evaluation:   Goals:  weight maintenance/stabilization  adequate nutrition impact symptom management  transition to a cancer preventative eating pattern     Progress Towards Goals: Progressing    Nutrition Rx & Recommendations:  Small, frequent meals/snacks may be easier to tolerate than 3 large daily meals  Aim for 5-6 small meals per day (every 2-3 hours)  Include protein at all meals/snacks    Follow a Cancer Preventative Nutrition Pattern: colorful, plant-based, low-fat, avoid added sugars, limit alcohol, include high fiber foods, limit processed meats, limit red meat, choose lean protein sources, use low-fat cooking methods, balance calories with physical activity, avoid excessive weight gain throughout life  Follow proper oral care; Try baking soda/salt water rinse recipe (mix 3/4 tsp salt + 1 tsp baking soda + 1 qt water; rinse with plain water after using) in Eating Hints book (pg 18)  Brush your teeth before/after meals & before bed  For constipation: drink plenty of fluids (>64 oz/day); drink hot liquids; eat high-fiber foods as tolerated (whole grains, beans, peas, nuts, seeds, fruits, vegetables, etc); increase physical activity as tolerated  Avoid increasing fiber intake too quickly, add fiber into your diet slowly; keep a record of your bowel movements (see pages 13-14 in you Eating Hints book)     Work on [employer] and keep a food journal if you find it to be helpful  Aim for 5-6 small meals per day (every 2-3 hrs)  Choose a lean protein + fiber source at all eating occasions    Patient choosen goals:  Use apple prune sauce each night for constipation - try to be consistent   Increase fiber intake: beans, whole grains, fruits, vegetables, lentils, nuts, etc   Always increase water intake when increasing fiber    Follow Up Plan: 4/13 during infusion   Recommend Referral to Other Providers: none at this time

## 2023-03-09 ENCOUNTER — OFFICE VISIT (OUTPATIENT)
Dept: PHYSICAL THERAPY | Facility: REHABILITATION | Age: 68
End: 2023-03-09

## 2023-03-09 DIAGNOSIS — R39.15 URINARY URGENCY: ICD-10-CM

## 2023-03-09 DIAGNOSIS — R32 INCONTINENCE IN FEMALE: Primary | ICD-10-CM

## 2023-03-09 NOTE — PROGRESS NOTES
Daily Note     Today's date: 3/9/2023  Patient name: Erika Rosas  : 1955  MRN: [id number]  Referring provider: RAFITA Espinosa  Dx:   Encounter Diagnosis     ICD-10-CM    1  Incontinence in female  R32       2  Urinary urgency  R39 15           Start Time: 1055  Stop Time: 1145   Total time in clinic (min): 50 minutes    Subjective: The patient notes that she practiced her pelvic floor exercises since her last visit  She notes that she practiced everytime she sat to have a meal  She notes that her awareness improved over time, but there were times she did not feel the contractions well  Objective: See treatment diary below      Assessment: Tolerated treatment well  Patient performed Biofeedback today  She did have trouble with awareness and was not feeling contractions  She had limited recruitment noted on the Biofeedback  Did check internally and she did have some palpable contraction, although weak, and she was more aware with the proprioception with PT than Biofeedback  She did experience some leakage post treatment when she sat up from supine position but mild compared to previous two visits  She is going to continue to work on HEP at home focusing on muscle recruitment and awareness  Plan: Continue per plan of care        leakage Precautions: history of endometrial cancer, chemotherapy and radiation; history of R LE blood clots; R ureteral stent  [employer] HEP    Manuals 2/6 2/13 2/27 3/9                                                             Neuro Re-Ed             Diaphragmatic Breathing             TA ADIM             TA + PFMC             Internal muscle cueing    10 min         Biofeedback sEMG   30 min 30 min total         PFMC: Slow Holds             PFMC: Quick Flicks   [id number]  supine 4x5 supine         PFMC + hip abd isometrics             PFMC + hip add isometrics             PFMC + ecc bridge             PFMC in sitting   10x           PFMC seated + tilt PFMC in standing             PFMC in Quadruped                                                                 Ther Ex             PPT             Bridge + SLR             Bridge + marching                                                                              Ther Activity             EDUCATION  40 min incuding bladder log review and counseling 25 min 5 min         PFMC + reach             PFMC + squat             PFMC + lift             PFMC + sit to stand             PFMC + step up/down             Gait Training                                       Modalities

## 2023-03-10 ENCOUNTER — HOSPITAL ENCOUNTER (OUTPATIENT)
Dept: INFUSION CENTER | Facility: HOSPITAL | Age: 68
End: 2023-03-10
Attending: INTERNAL MEDICINE

## 2023-03-10 VITALS
HEART RATE: 61 BPM | DIASTOLIC BLOOD PRESSURE: 64 MMHG | TEMPERATURE: 97.6 F | SYSTOLIC BLOOD PRESSURE: 111 MMHG | RESPIRATION RATE: 18 BRPM

## 2023-03-10 DIAGNOSIS — D50.8 IRON DEFICIENCY ANEMIA SECONDARY TO INADEQUATE DIETARY IRON INTAKE: Primary | ICD-10-CM

## 2023-03-10 DIAGNOSIS — C54.1 ENDOMETRIAL CANCER (HCC): ICD-10-CM

## 2023-03-10 DIAGNOSIS — R79.0 ABNORMAL IRON SATURATION: ICD-10-CM

## 2023-03-10 DIAGNOSIS — Z45.2 ENCOUNTER FOR CENTRAL LINE CARE: ICD-10-CM

## 2023-03-10 RX ORDER — SODIUM CHLORIDE 9 MG/ML
20 INJECTION, SOLUTION INTRAVENOUS ONCE
Status: CANCELLED | OUTPATIENT
Start: 2023-03-16

## 2023-03-10 RX ORDER — SODIUM CHLORIDE 9 MG/ML
20 INJECTION, SOLUTION INTRAVENOUS ONCE
Status: COMPLETED | OUTPATIENT
Start: 2023-03-10 | End: 2023-03-10

## 2023-03-10 RX ADMIN — IRON SUCROSE 200 MG: 20 INJECTION, SOLUTION INTRAVENOUS at 09:36

## 2023-03-10 RX ADMIN — SODIUM CHLORIDE 20 ML/HR: 0.9 INJECTION, SOLUTION INTRAVENOUS at 09:32

## 2023-03-13 ENCOUNTER — PREP FOR PROCEDURE (OUTPATIENT)
Dept: UROLOGY | Facility: AMBULATORY SURGERY CENTER | Age: 68
End: 2023-03-13

## 2023-03-13 DIAGNOSIS — R39.89 SUSPECTED UTI: ICD-10-CM

## 2023-03-13 DIAGNOSIS — N13.30 HYDRONEPHROSIS OF RIGHT KIDNEY: Primary | ICD-10-CM

## 2023-03-15 ENCOUNTER — HOSPITAL ENCOUNTER (OUTPATIENT)
Dept: CT IMAGING | Facility: HOSPITAL | Age: 68
Discharge: HOME | End: 2023-03-15
Attending: OBSTETRICS & GYNECOLOGY

## 2023-03-15 DIAGNOSIS — Z85.42 ENCOUNTER FOR FOLLOW-UP SURVEILLANCE OF ENDOMETRIAL CANCER: ICD-10-CM

## 2023-03-15 DIAGNOSIS — Z08 ENCOUNTER FOR FOLLOW-UP SURVEILLANCE OF ENDOMETRIAL CANCER: ICD-10-CM

## 2023-03-15 RX ADMIN — IOHEXOL 100 ML: 350 INJECTION, SOLUTION INTRAVENOUS at 12:00

## 2023-03-16 ENCOUNTER — TELEPHONE (OUTPATIENT)
Dept: NUTRITION | Facility: CLINIC | Age: 68
End: 2023-03-16

## 2023-03-16 ENCOUNTER — HOSPITAL ENCOUNTER (OUTPATIENT)
Dept: INFUSION CENTER | Facility: CLINIC | Age: 68
Discharge: HOME | End: 2023-03-16

## 2023-03-16 ENCOUNTER — OFFICE VISIT (OUTPATIENT)
Dept: PHYSICAL THERAPY | Facility: REHABILITATION | Age: 68
End: 2023-03-16

## 2023-03-16 VITALS
DIASTOLIC BLOOD PRESSURE: 73 MMHG | HEART RATE: 74 BPM | RESPIRATION RATE: 18 BRPM | OXYGEN SATURATION: 100 % | TEMPERATURE: 97.8 F | SYSTOLIC BLOOD PRESSURE: 107 MMHG

## 2023-03-16 DIAGNOSIS — Z45.2 ENCOUNTER FOR CENTRAL LINE CARE: ICD-10-CM

## 2023-03-16 DIAGNOSIS — C54.1 ENDOMETRIAL CANCER (HCC): ICD-10-CM

## 2023-03-16 DIAGNOSIS — R79.0 ABNORMAL IRON SATURATION: ICD-10-CM

## 2023-03-16 DIAGNOSIS — R39.89 SUSPECTED UTI: ICD-10-CM

## 2023-03-16 DIAGNOSIS — R32 INCONTINENCE IN FEMALE: Primary | ICD-10-CM

## 2023-03-16 DIAGNOSIS — R39.15 URINARY URGENCY: ICD-10-CM

## 2023-03-16 DIAGNOSIS — N13.30 HYDRONEPHROSIS OF RIGHT KIDNEY: ICD-10-CM

## 2023-03-16 DIAGNOSIS — D50.8 IRON DEFICIENCY ANEMIA SECONDARY TO INADEQUATE DIETARY IRON INTAKE: Primary | ICD-10-CM

## 2023-03-16 RX ORDER — SODIUM CHLORIDE 9 MG/ML
20 INJECTION, SOLUTION INTRAVENOUS ONCE
Status: CANCELLED | OUTPATIENT
Start: 2023-03-23

## 2023-03-16 RX ORDER — SODIUM CHLORIDE 9 MG/ML
20 INJECTION, SOLUTION INTRAVENOUS ONCE
Status: COMPLETED | OUTPATIENT
Start: 2023-03-16 | End: 2023-03-16

## 2023-03-16 RX ADMIN — SODIUM CHLORIDE 200 MG: 9 INJECTION, SOLUTION INTRAVENOUS at 10:31

## 2023-03-16 RX ADMIN — SODIUM CHLORIDE 20 ML/HR: 0.9 INJECTION, SOLUTION INTRAVENOUS at 10:28

## 2023-03-16 NOTE — PROGRESS NOTES
Daily Note     Today's date: 3/16/2023  Patient name: Annmarie Vuong  : 1955  MRN: [id number]  Referring provider: RAFITA Foster  Dx:   Encounter Diagnosis     ICD-10-CM    1  Incontinence in female  R32       2  Urinary urgency  R39 15           Start Time: 1400  Stop Time: 1500  Total time in clinic (min): 60 minutes    Subjective: The patient had an infusion earlier today  She notes feeling tired overall but did not sleep well less night anticipating having to get up earlier today for an appointment  She does not feel any more frequent or leakage after having an infusion  She notes that things are about the same over the last week and she continues to experience incontinence of urine  She does not always have urges prior to her large losses of urine  She did not empty her bladder prior to session and notes that she had a significant amount of leakage when she got into the car to come over to PT  She is scheduled for her ureteral stent procedure on 3/30  Objective: See treatment diary below      Assessment: Tolerated treatment well  Patient did have awareness of her pelvic floor muscles today with exercises  PT cueing performed today because there is limited recruitment noted, but she did have improved recruitment and awareness with practice  She did not generally exhibit fatigue  She initially was holding her breath and pressure downward noted  Talked about pressure management as well today with body mechanics and breath holding with transition of movements such as supine to sit and sit to stand as she generally leaks with these types of motions  She is going to record urges and emptying as well as episodes of leakage for two consecutive days between now and her next visit  Plan: Continue per plan of care        leakage Precautions: history of endometrial cancer, chemotherapy and radiation; history of R LE blood clots; R ureteral stent  [facility] HEP    Manuals 2/6 2/13 2/27 3/9 3/16 Neuro Re-Ed             Diaphragmatic Breathing             TA ADIM             TA + PFMC             Internal muscle cueing    10 min 15 min        Biofeedback sEMG   30 min 30 min total         PFMC: Slow Holds             PFMC: Quick Flicks   [id number]  supine 4x5 supine         PFMC + hip abd isometrics             PFMC + hip add isometrics             PFMC + ecc bridge             PFMC in sitting   10x           PFMC seated + tilt             PFMC in standing             PFMC in Quadruped                                                                 Ther Ex             PPT             Bridge + SLR             Bridge + marching                                                                              Ther Activity             EDUCATION  40 min incuding bladder log review and counseling 25 min 5 min 30 min        PFMC + reach             PFMC + squat             PFMC + lift             PFMC + sit to stand             PFMC + step up/down             Gait Training                                       Modalities

## 2023-03-16 NOTE — PROGRESS NOTES
Patient tolerated treatment today without complications   Patient verified upcoming appointment and declined AVS

## 2023-03-17 ENCOUNTER — NUTRITION (OUTPATIENT)
Dept: NUTRITION | Facility: CLINIC | Age: 68
End: 2023-03-17

## 2023-03-17 ENCOUNTER — TELEPHONE (OUTPATIENT)
Dept: NUTRITION | Facility: CLINIC | Age: 68
End: 2023-03-17

## 2023-03-17 DIAGNOSIS — Z71.3 NUTRITIONAL COUNSELING: Primary | ICD-10-CM

## 2023-03-17 NOTE — PATIENT INSTRUCTIONS
Nutrition Rx & Recommendations:  Small, frequent meals/snacks may be easier to tolerate than 3 large daily meals  Aim for 5-6 small meals per day (every 2-3 hours)  Include protein at all meals/snacks  Follow a Cancer Preventative Nutrition Pattern: colorful, plant-based, low-fat, avoid added sugars, limit alcohol, include high fiber foods, limit processed meats, limit red meat, choose lean protein sources, use low-fat cooking methods, balance calories with physical activity, avoid excessive weight gain throughout life  Follow proper oral care; Try baking soda/salt water rinse recipe (mix 3/4 tsp salt + 1 tsp baking soda + 1 qt water; rinse with plain water after using) in Eating Hints book (pg 18)  Brush your teeth before/after meals & before bed  For constipation: drink plenty of fluids (>64 oz/day); drink hot liquids; eat high-fiber foods as tolerated (whole grains, beans, peas, nuts, seeds, fruits, vegetables, etc); increase physical activity as tolerated  Avoid increasing fiber intake too quickly, add fiber into your diet slowly; keep a record of your bowel movements (see pages 13-14 in you Eating Hints book)     Work on [employer] and keep a food journal if you find it to be helpful  Aim for 5-6 small meals per day (every 2-3 hrs)  Choose a lean protein + fiber source at all eating occasions    Patient choosen goals:  Use apple prune sauce each night for constipation - try to be consistent   Increase fiber intake: beans, whole grains, fruits, vegetables, lentils, nuts, etc   Always increase water intake when increasing fiber    Follow Up Plan: 4/13 during infusion   Recommend Referral to Other Providers: none at this time

## 2023-03-17 NOTE — PROGRESS NOTES
Assessment/Plan:    Problem List Items Addressed This Visit        Genitourinary    Obstruction of right ureter     Stent exchanged scheduled with urology             Other    Encounter for follow-up surveillance of endometrial cancer - Primary     72-year-old with stage IB endometrial cancer in remission presents for follow up  Imaging reviewed  LN that were enlarged previously remain unchanged, lending to benign process or treated tumor  D/w pt that this is likely treated tumor however we can consider repeat imaging given slightly enlarged  Given asymptomatic, would recommend 6 months or sooner if symptoms develop  RTC 3 months for exam  Plan for repeat imaging in 6 months     Genetics set up for June               CHIEF COMPLAINT: follow up      Problem:   Cancer Staging   Endometrial cancer [facility])  Staging form: Corpus Uteri - Carcinoma, AJCC 7th Edition  - Clinical stage from 12/19/2017: FIGO Stage IB (T1b, N0, M0) - Signed by Brent Gregory MD on 2/12/2018        Previous therapy:  Oncology History   Endometrial cancer ([facility] )   11/17/2017 Initial Diagnosis    Endometrial cancer ([facility] [address] )     11/17/2017 Biopsy    ENDOMETRIAL BIOPSY: WELL DIFFERENTIATED ENDOMETRIAL ADENOCARCINOMA (FIGO I) WITH FOCALMUCINOUS FEATURES  Part B: ENDOCERVICAL POLYP:BENIGN ENDOCERVICAL      12/19/2017 Surgery    Robotic assisted total laparoscopic hysterectomy with bilateral salpingo-oophorectomy and sentinel bilateral pelvic lymph node dissection  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 (4 4 x 3 2 cm tumor, 9 4/15 4mm invasion, NO LVSI, washings revealed atypical cellular changes)     12/19/2017 Genetic Testing    Morrison testing negative     6/28/2019 Biopsy    A  Breast, Right, US BX Right Breast 1000 4 cmfn:  - Benign breast tissue with focal histiocytic aggregate  See comment   - Negative for atypia and in-situ or invasive carcinoma       7/8/2019 Recurrence    Presented with right lower extremity DVT and CT demonstrating right pelvic sidewall mass with venous, ureteral and nerve compression causing significant neuropathic pain  Biopsy:  Lymph Node, Right pelvic lymph node x3:  - High-grade adenocarcinoma  7/30/2019 - 1/6/2020 Chemotherapy    Taxol 175 mg/m2 and carboplatin AUC 6 every 21 days  Dose was reduced to taxol 135 mg/m2 and carboplatin AUC 5  Completed 6 cycles  Treatment protracted due to multiple hospital admissions  2/24/2020 - 4/13/2020 Radiation    adjuvant external beam radiation therapy to the whole pelvis to 4500 cGy followed by boost to gross disease of an additional 2200 cGy     11/23/2020 Progression    New necrotic adenopathy in the retroperitoneum on CT      12/21/2020 - 2/3/2023 Chemotherapy    Began chemotherapy with rucaparib, atezolizumab, and bevacizumab as per [facility] clinical trial     Rucaparib held cycle 28 secondary to fatigue  Avastin held since cycle 30 for elevated UPC  Treatment held for one cycle after cycle 31 for mucositis/fatigue             Patient ID: Mary Velez is a 74 y o  female  46-year-old with stage IB endometrial cancer in remission presents for follow up  Pt was most recently on EndoBARR and decided to discontinue for worsening symptoms  Repeat imaging with stable findings and SABAS  She reports she is overall feeling well  She continues to have dry mouth which she believes is secondary to other medications  She is having her stent replaced by urology  She feels much better without the external PCN  Her appetite has improved and she has gained weight  Denies any abdominal pain or pelvic pain  No vaginal bleeding or discharge        The following portions of the patient's history were reviewed and updated as appropriate: allergies, current medications, past family history, past medical history, past social history, past surgical history and problem list     Review of Systems   Constitutional: Negative for appetite change, chills, fatigue and fever    HENT:        Mouth dryness   Respiratory: Negative for chest tightness and shortness of breath  Gastrointestinal: Negative for abdominal distention, abdominal pain, constipation, diarrhea and nausea  Genitourinary: Negative for difficulty urinating, flank pain, frequency, urgency, vaginal bleeding, vaginal discharge and vaginal pain  Musculoskeletal: Negative for back pain, joint swelling and myalgias  Skin: Negative for rash  Neurological: Negative for dizziness, light-headedness, numbness and headaches  Current Outpatient Medications   Medication Sig Dispense Refill   • ARIPiprazole (ABILIFY) 20 MG tablet Take 20 mg by mouth in the morning  • aspirin (ECOTRIN LOW STRENGTH) 81 mg EC tablet Take 81 mg by mouth daily     • Calcium-Magnesium-Vitamin D (CALCIUM 500 PO) Take by mouth daily      • cholecalciferol (VITAMIN D3) 1,000 units tablet Take 4 tablets (4,000 Units total) by mouth daily 90 tablet 0   • Cranberry-Vitamin C 250-60 MG CAPS Take 1 tablet by mouth in the morning 90 capsule 3   • Cyanocobalamin (VITAMIN B12 PO) Take by mouth daily      • dronabinol (MARINOL) 2 5 mg capsule Take 1 capsule (2 5 mg total) by mouth 2 (two) times a day before meals 30 capsule 0   • enoxaparin (LOVENOX) 80 mg/0 8 mL INJECT 0 8ML (80MG TOTAL)  SUBCUTANEOUSLY EVERY 24  HOURS 72 mL 1   • folic acid (FOLVITE) 1 mg tablet Take 1 tablet (1 mg total) by mouth daily  0   • Gemtesa 75 MG TABS TAKE 75 MG BY MOUTH IN THE MORNING 90 tablet 2   • LORazepam (ATIVAN) 0 5 mg tablet Take 1 tablet (0 5 mg total) by mouth every 6 (six) hours as needed for anxiety (or nausea) 36 tablet 1   • Multiple Vitamin (MULTIVITAMIN) tablet Take 1 tablet by mouth daily     • naloxone (NARCAN) 4 mg/0 1 mL nasal spray Administer 1 spray into a nostril  If no response after 2-3 minutes, give another dose in the other nostril using a new spray   1 each 0   • omeprazole (PriLOSEC) 20 mg delayed release capsule Take 20 mg by mouth daily     • ondansetron (ZOFRAN) 8 mg tablet Take 1 tablet (8 mg total) by mouth every 8 (eight) hours as needed for nausea or vomiting 30 tablet 1   • oxybutynin (DITROPAN XL) 15 MG 24 hr tablet Take 1 tablet (15 mg total) by mouth daily at bedtime 90 tablet 3   • saccharomyces boulardii (FLORASTOR) 250 mg capsule Take 1 capsule (250 mg total) by mouth 2 (two) times a day  0   • senna (SENOKOT) 8 6 MG tablet Take 1 tablet (8 6 mg total) by mouth 2 (two) times a day as needed for constipation 60 tablet 0   • venlafaxine (EFFEXOR) 75 mg tablet Take 75 mg by mouth 3 (three) times a day       • amoxicillin (AMOXIL) 500 mg capsule  (Patient not taking: Reported on 1/13/2023)     • BD PosiFlush 0 9 % SOLN  (Patient not taking: Reported on 1/13/2023)     • clotrimazole-betamethasone (LOTRISONE) 1-0 05 % cream Apply topically 2 (two) times a day (Patient not taking: Reported on 1/13/2023) 30 g 0   • ergocalciferol (VITAMIN D2) 50,000 units Take 1 capsule (50,000 Units total) by mouth once a week for 6 doses (Patient not taking: Reported on 11/29/2022) 6 capsule 0   • estradiol (ESTRACE VAGINAL) 0 1 mg/g vaginal cream Insert 1g nightly for 6 weeks, then 1-2x weekly  (Patient not taking: Reported on 1/20/2023) 42 5 g 1   • lisinopril (ZESTRIL) 5 mg tablet Take 1 tablet (5 mg total) by mouth daily (Patient not taking: Reported on 2/20/2023) 90 tablet 3   • methylprednisolone (MEDROL) 4 mg tablet Take 1 tablet (4 mg total) by mouth in the morning Take 2 tables once a day for 5 days, take 1 tablet once a day for 5 days, take 1 tablet every other day for 14 days  (Patient not taking: Reported on 1/20/2023) 21 tablet 0   • rucaparib (RUBRACA) 300 mg tablet Take 600 mg by mouth every 12 (twelve) hours Take with or without food  Do not repeat a vomited dose   (Patient not taking: Reported on 2/20/2023)     • sodium chloride, PF, 0 9 % 10 mL by Intracatheter route daily (Patient not taking: Reported on 1/13/2023) 300 mL 3     No current facility-administered medications for this visit  Objective:    Blood pressure 110/60, pulse 75, temperature 98 4 °F (36 9 °C), temperature source Temporal, resp  rate 18, height 4' 11" (1 499 m), weight 56 2 kg (124 lb), SpO2 98 %, not currently breastfeeding  Body mass index is 25 04 kg/m²  Body surface area is 1 5 meters squared  Physical Exam  HENT:      Head: Normocephalic and atraumatic  Nose: Nose normal    Cardiovascular:      Rate and Rhythm: Normal rate and regular rhythm  Pulmonary:      Effort: Pulmonary effort is normal    Abdominal:      General: There is no distension  Palpations: Abdomen is soft  There is no mass  Genitourinary:     Comments: defer  Musculoskeletal:         General: No swelling  Normal range of motion  Cervical back: Normal range of motion  Skin:     General: Skin is warm and dry  Neurological:      General: No focal deficit present  Mental Status: She is alert     Psychiatric:         Mood and Affect: Mood normal            Lab Results   Component Value Date     10/27/2017    K 4 3 01/28/2023     (H) 01/28/2023    CO2 23 01/28/2023    BUN 29 (H) 01/28/2023    CREATININE 1 12 01/28/2023    GLUCOSE 219 (H) 12/19/2017    GLUF 86 01/28/2023    CALCIUM 8 8 01/28/2023    CORRECTEDCA 10 5 (H) 11/11/2022    AST 8 (L) 01/28/2023    ALT 8 01/28/2023    ALKPHOS 108 (H) 01/28/2023    PROT 6 9 10/27/2017    BILITOT 0 3 10/27/2017    EGFR 51 01/28/2023     Lab Results   Component Value Date    WBC 5 41 01/28/2023    HGB 9 4 (L) 01/28/2023    HCT 30 7 (L) 01/28/2023    MCV 97 01/28/2023     01/28/2023     Lab Results   Component Value Date    NEUTROABS 5 11 12/30/2022        Trend:  No results found for:

## 2023-03-17 NOTE — TELEPHONE ENCOUNTER
Voice message received from pt requesting to change today's visit to a phone visit    Appt changed per her request

## 2023-03-17 NOTE — ASSESSMENT & PLAN NOTE
72-year-old with stage IB endometrial cancer in remission presents for follow up  Imaging reviewed  LN that were enlarged previously remain unchanged, lending to benign process or treated tumor  D/w pt that this is likely treated tumor however we can consider repeat imaging given slightly enlarged  Given asymptomatic, would recommend 6 months or sooner if symptoms develop       RTC 3 months for exam  Plan for repeat imaging in 6 months     Genetics set up for [place]

## 2023-03-18 LAB
BACTERIA UR CULT: ABNORMAL
BACTERIA UR CULT: ABNORMAL

## 2023-03-20 ENCOUNTER — ANESTHESIA EVENT (OUTPATIENT)
Dept: PERIOP | Facility: HOSPITAL | Age: 68
End: 2023-03-20

## 2023-03-20 ENCOUNTER — OFFICE VISIT (OUTPATIENT)
Dept: GYNECOLOGIC ONCOLOGY | Facility: CLINIC | Age: 68
End: 2023-03-20

## 2023-03-20 ENCOUNTER — TELEPHONE (OUTPATIENT)
Dept: UROLOGY | Facility: AMBULATORY SURGERY CENTER | Age: 68
End: 2023-03-20

## 2023-03-20 VITALS
TEMPERATURE: 98.4 F | DIASTOLIC BLOOD PRESSURE: 60 MMHG | OXYGEN SATURATION: 98 % | RESPIRATION RATE: 18 BRPM | HEIGHT: 59 IN | BODY MASS INDEX: 25 KG/M2 | SYSTOLIC BLOOD PRESSURE: 110 MMHG | WEIGHT: 124 LBS | HEART RATE: 75 BPM

## 2023-03-20 DIAGNOSIS — Z08 ENCOUNTER FOR FOLLOW-UP SURVEILLANCE OF ENDOMETRIAL CANCER: Primary | ICD-10-CM

## 2023-03-20 DIAGNOSIS — Z85.42 ENCOUNTER FOR FOLLOW-UP SURVEILLANCE OF ENDOMETRIAL CANCER: Primary | ICD-10-CM

## 2023-03-20 DIAGNOSIS — N13.5 OBSTRUCTION OF RIGHT URETER: ICD-10-CM

## 2023-03-20 DIAGNOSIS — R39.9 UTI SYMPTOMS: Primary | ICD-10-CM

## 2023-03-20 DIAGNOSIS — N18.31 STAGE 3A CHRONIC KIDNEY DISEASE (HCC): Primary | ICD-10-CM

## 2023-03-20 RX ORDER — SULFAMETHOXAZOLE AND TRIMETHOPRIM 800; 160 MG/1; MG/1
1 TABLET ORAL EVERY 12 HOURS SCHEDULED
Qty: 10 TABLET | Refills: 0 | Status: SHIPPED | OUTPATIENT
Start: 2023-03-20 | End: 2023-03-25

## 2023-03-20 NOTE — TELEPHONE ENCOUNTER
[facility] Clinical 27 minutes ago (1:41 PM)     Recommend starting 5 days prior to OR  I spoke with the patient and provided her with this information per Dr Lowe

## 2023-03-20 NOTE — TELEPHONE ENCOUNTER
Patient scheduled for endoscopic procedure in 10 days with stent insertion  Urine culture reveals methicillin sensitive Staphylococcus aureus  Recommend Bactrim twice daily for a 5-day course prior to surgery as she is allergic to cephalosporins  Prescription sent to pharmacy  Please call patient and ask her to begin antibiotics prior to surgery

## 2023-03-22 NOTE — PRE-PROCEDURE INSTRUCTIONS
Pre-Surgery Instructions:   Medication Instructions   • ARIPiprazole (ABILIFY) 20 MG tablet Take day of surgery  • aspirin (ECOTRIN LOW STRENGTH) 81 mg EC tablet Instructions provided by MD   • Calcium-Magnesium-Vitamin D (CALCIUM 500 PO) Stop taking 7 days prior to surgery  • cholecalciferol (VITAMIN D3) 1,000 units tablet Stop taking 7 days prior to surgery  • Cranberry-Vitamin C 250-60 MG CAPS Stop taking 7 days prior to surgery  • Cyanocobalamin (VITAMIN B12 PO) Stop taking 7 days prior to surgery  • enoxaparin (LOVENOX) 80 mg/0 8 mL Instructions provided by MD   • folic acid (FOLVITE) 1 mg tablet Stop taking 7 days prior to surgery  • Gemtesa 75 MG TABS Hold day of surgery  • Multiple Vitamin (MULTIVITAMIN) tablet Stop taking 7 days prior to surgery  • naloxone (NARCAN) 4 mg/0 1 mL nasal spray Hold day of surgery  • omeprazole (PriLOSEC) 20 mg delayed release capsule Take day of surgery  • oxybutynin (DITROPAN XL) 15 MG 24 hr tablet Take night before surgery   • saccharomyces boulardii (FLORASTOR) 250 mg capsule Hold day of surgery  • senna (SENOKOT) 8 6 MG tablet Hold day of surgery  • sulfamethoxazole-trimethoprim (BACTRIM DS) 800-160 mg per tablet Hold day of surgery  • venlafaxine (EFFEXOR) 75 mg tablet Take day of surgery  Medication instructions for day surgery reviewed  Please use only a sip of water to take your instructed medications  Avoid all over the counter vitamins, supplements and NSAIDS for one week prior to surgery per anesthesia guidelines  Tylenol is ok to take as needed  You will receive a call one business day prior to surgery with an arrival time and hospital directions  If your surgery is scheduled on a Monday, the hospital will be calling you on the Friday prior to your surgery  If you have not heard from anyone by 8pm, please call the hospital supervisor through the hospital  at [phone]  Melissa Vides [phone])      Do not eat or drink anything after midnight the night before your surgery, including candy, mints, lifesavers, or chewing gum  Do not drink alcohol 24hrs before your surgery  Try not to smoke at least 24hrs before your surgery  Follow the pre surgery showering instructions as listed in the [facility] Surgical Experience Booklet” or otherwise provided by your surgeon's office  Do not shave the surgical area 24 hours before surgery  Do not apply any lotions, creams, including makeup, cologne, deodorant, or perfumes after showering on the day of your surgery  No contact lenses, eye make-up, or artificial eyelashes  Remove nail polish, including gel polish, and any artificial, gel, or acrylic nails if possible  Remove all jewelry including rings and body piercing jewelry  Wear causal clothing that is easy to take on and off  Consider your type of surgery  Keep any valuables, jewelry, piercings at home  Please bring any specially ordered equipment (sling, braces) if indicated  Arrange for a responsible person to drive you to and from the hospital on the day of your surgery  Visitor Guidelines discussed  Call the surgeon's office with any new illnesses, exposures, or additional questions prior to surgery  Please reference your [facility] Surgical Experience Booklet” for additional information to prepare for your upcoming surgery

## 2023-03-23 ENCOUNTER — HOSPITAL ENCOUNTER (OUTPATIENT)
Dept: INFUSION CENTER | Facility: CLINIC | Age: 68
Discharge: HOME | End: 2023-03-23

## 2023-03-23 ENCOUNTER — OFFICE VISIT (OUTPATIENT)
Dept: PHYSICAL THERAPY | Facility: REHABILITATION | Age: 68
End: 2023-03-23

## 2023-03-23 VITALS
SYSTOLIC BLOOD PRESSURE: 112 MMHG | DIASTOLIC BLOOD PRESSURE: 72 MMHG | RESPIRATION RATE: 16 BRPM | OXYGEN SATURATION: 96 % | TEMPERATURE: 97.8 F | HEART RATE: 72 BPM

## 2023-03-23 DIAGNOSIS — R39.15 URINARY URGENCY: ICD-10-CM

## 2023-03-23 DIAGNOSIS — R80.9 NEPHROTIC RANGE PROTEINURIA: ICD-10-CM

## 2023-03-23 DIAGNOSIS — D50.8 IRON DEFICIENCY ANEMIA SECONDARY TO INADEQUATE DIETARY IRON INTAKE: Primary | ICD-10-CM

## 2023-03-23 DIAGNOSIS — R32 INCONTINENCE IN FEMALE: Primary | ICD-10-CM

## 2023-03-23 DIAGNOSIS — N18.31 STAGE 3A CHRONIC KIDNEY DISEASE (HCC): ICD-10-CM

## 2023-03-23 DIAGNOSIS — Z45.2 ENCOUNTER FOR CENTRAL LINE CARE: ICD-10-CM

## 2023-03-23 DIAGNOSIS — R79.0 ABNORMAL IRON SATURATION: ICD-10-CM

## 2023-03-23 DIAGNOSIS — C54.1 ENDOMETRIAL CANCER (HCC): ICD-10-CM

## 2023-03-23 LAB
ANION GAP SERPL CALCULATED.3IONS-SCNC: 10 MMOL/L (ref 4–13)
BUN SERPL-MCNC: 23 MG/DL (ref 5–25)
CALCIUM SERPL-MCNC: 8.5 MG/DL (ref 8.4–10.2)
CHLORIDE SERPL-SCNC: 110 MMOL/L (ref 96–108)
CO2 SERPL-SCNC: 19 MMOL/L (ref 21–32)
CREAT SERPL-MCNC: 1.6 MG/DL (ref 0.6–1.3)
CREAT UR-MCNC: 20.6 MG/DL
GFR SERPL CREATININE-BSD FRML MDRD: 33 ML/MIN/1.73SQ M
GLUCOSE SERPL-MCNC: 147 MG/DL (ref 65–140)
MICROALBUMIN UR-MCNC: 322 MG/L (ref 0–20)
MICROALBUMIN/CREAT 24H UR: 1563 MG/G CREATININE (ref 0–30)
POTASSIUM SERPL-SCNC: 3.7 MMOL/L (ref 3.5–5.3)
SODIUM SERPL-SCNC: 139 MMOL/L (ref 135–147)

## 2023-03-23 RX ORDER — SODIUM CHLORIDE 9 MG/ML
20 INJECTION, SOLUTION INTRAVENOUS ONCE
Status: CANCELLED | OUTPATIENT
Start: 2023-03-30

## 2023-03-23 RX ORDER — SODIUM CHLORIDE 9 MG/ML
20 INJECTION, SOLUTION INTRAVENOUS ONCE
Status: COMPLETED | OUTPATIENT
Start: 2023-03-23 | End: 2023-03-23

## 2023-03-23 RX ADMIN — SODIUM CHLORIDE 20 ML/HR: 0.9 INJECTION, SOLUTION INTRAVENOUS at 08:30

## 2023-03-23 RX ADMIN — SODIUM CHLORIDE 200 MG: 9 INJECTION, SOLUTION INTRAVENOUS at 08:50

## 2023-03-23 NOTE — PROGRESS NOTES
Daily Note     Today's date: 3/23/2023  Patient name: Crispin Mott  : 1955  MRN: [id number]  Referring provider: RAFITA Akers  Dx:   Encounter Diagnosis     ICD-10-CM    1  Incontinence in female  R32       2  Urinary urgency  R39 15           Start Time: 1100  Stop Time: 1145  Total time in clinic (min): 45 minutes    Subjective: The patient notes that she has been experiencing constipation  She did take Senna from being constipated and this helps her to go but it also gives her loose stools  Her stools are really hard when she is constipated  She is working the oncology dietician and she recommended bran, apple sauce and prunes (a spoonful of this mixture) daily to help with fiber and regularity  She did have a urinalysis done prior to her upcoming stent replacement procedure and she had some bacteria so she will be starting an antibiotic on Saturday (5 days prior to her procedure)  She was noticing some frequency symptoms and also some pressure and discomfort with emptying her bladder for about one week  She did not have a chance to practice her exercises over the past week because she had a tough week with her mom as 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 who lives with her and has dementia  She had an iron infusion earlier this morning  Objective: See treatment diary below      Assessment: Tolerated treatment well  Patient did well exercises today  More palpable muscle recruitment noted  She has some awareness of the contraction, but limited and inconsistent with being able to feel the contractions  She did have some leakage during treatment when she was standing to take off her Depends and bent over and also when she was transitioning from sitting to supine on the treatment table  No leakage while we were practicing contractions  No visible leakage when she transitioned back into sitting from supine and from sit to stand to get dressed again   Talked about pressure management with movements and positions as this also affects her incontinence  Practice proper supine to sit transfer  Her skin is much improved  Some mild redness in the left vulva and groin, but improved from the past 2-3 visits  She will return in 2 weeks for her next scheduled visit due to her procedure next Thursday  Plan: Continue per plan of care        leakage Precautions: history of endometrial cancer, chemotherapy and radiation; history of R LE blood clots; R ureteral stent  [facility] HEP    Manuals 2/6 2/13 2/27 3/9 3/16 3/23                                                           Neuro Re-Ed             Diaphragmatic Breathing             TA ADIM             TA + PFMC             Internal muscle cueing    10 min 15 min 15 min       Biofeedback sEMG   30 min 30 min total         PFMC: Slow Holds             PFMC: Quick Flicks   4U33  supine 4x5 supine         PFMC + hip abd isometrics             PFMC + hip add isometrics             PFMC + ecc bridge             PFMC in sitting   10x           PFMC seated + tilt             PFMC in standing             PFMC in Quadruped                                                                 Ther Ex             PPT             Bridge + SLR             Bridge + marching                                                                              Ther Activity             EDUCATION  40 min incuding bladder log review and counseling 25 min 5 min 30 min 30 min       PFMC + reach             PFMC + squat             PFMC + lift             PFMC + sit to stand             PFMC + step up/down             Gait Training                                       Modalities

## 2023-03-23 NOTE — PROGRESS NOTES
Patient to [facility] for Lab Testing / [facility]: Offers no complaints at present time: Left PAC accessed without difficulty: Good blood return noted: Labs drawn per MD order

## 2023-03-30 ENCOUNTER — TELEPHONE (OUTPATIENT)
Dept: UROLOGY | Facility: AMBULATORY SURGERY CENTER | Age: 68
End: 2023-03-30

## 2023-03-30 ENCOUNTER — ANESTHESIA (OUTPATIENT)
Dept: PERIOP | Facility: HOSPITAL | Age: 68
End: 2023-03-30

## 2023-03-30 ENCOUNTER — APPOINTMENT (OUTPATIENT)
Dept: RADIOLOGY | Facility: HOSPITAL | Age: 68
End: 2023-03-30

## 2023-03-30 ENCOUNTER — HOSPITAL ENCOUNTER (OUTPATIENT)
Facility: HOSPITAL | Age: 68
Setting detail: OUTPATIENT SURGERY
Discharge: HOME | End: 2023-03-30
Attending: UROLOGY | Admitting: UROLOGY

## 2023-03-30 ENCOUNTER — APPOINTMENT (OUTPATIENT)
Dept: PHYSICAL THERAPY | Facility: REHABILITATION | Age: 68
End: 2023-03-30

## 2023-03-30 VITALS
WEIGHT: 124 LBS | HEART RATE: 77 BPM | DIASTOLIC BLOOD PRESSURE: 53 MMHG | BODY MASS INDEX: 25 KG/M2 | TEMPERATURE: 97.6 F | SYSTOLIC BLOOD PRESSURE: 101 MMHG | RESPIRATION RATE: 16 BRPM | OXYGEN SATURATION: 99 % | HEIGHT: 59 IN

## 2023-03-30 DIAGNOSIS — N13.30 HYDRONEPHROSIS OF RIGHT KIDNEY: ICD-10-CM

## 2023-03-30 DEVICE — STENT URETERAL 6FR 22CM INLAY OPTIMA W/O GW: Type: IMPLANTABLE DEVICE | Status: FUNCTIONAL

## 2023-03-30 RX ORDER — PROPOFOL 10 MG/ML
INJECTION, EMULSION INTRAVENOUS AS NEEDED
Status: DISCONTINUED | OUTPATIENT
Start: 2023-03-30 | End: 2023-03-30

## 2023-03-30 RX ORDER — OXYCODONE HYDROCHLORIDE AND ACETAMINOPHEN 5; 325 MG/1; MG/1
1 TABLET ORAL EVERY 4 HOURS PRN
Qty: 4 TABLET | Refills: 0 | Status: SHIPPED | OUTPATIENT
Start: 2023-03-30 | End: 2023-04-09

## 2023-03-30 RX ORDER — SODIUM CHLORIDE, SODIUM LACTATE, POTASSIUM CHLORIDE, CALCIUM CHLORIDE 600; 310; 30; 20 MG/100ML; MG/100ML; MG/100ML; MG/100ML
INJECTION, SOLUTION INTRAVENOUS CONTINUOUS PRN
Status: DISCONTINUED | OUTPATIENT
Start: 2023-03-30 | End: 2023-03-30

## 2023-03-30 RX ORDER — OXYCODONE HYDROCHLORIDE AND ACETAMINOPHEN 5; 325 MG/1; MG/1
1 TABLET ORAL EVERY 4 HOURS PRN
Status: DISCONTINUED | OUTPATIENT
Start: 2023-03-30 | End: 2023-03-30 | Stop reason: HOSPADM

## 2023-03-30 RX ORDER — MIDAZOLAM HYDROCHLORIDE 2 MG/2ML
INJECTION, SOLUTION INTRAMUSCULAR; INTRAVENOUS AS NEEDED
Status: DISCONTINUED | OUTPATIENT
Start: 2023-03-30 | End: 2023-03-30

## 2023-03-30 RX ORDER — FENTANYL CITRATE/PF 50 MCG/ML
25 SYRINGE (ML) INJECTION
Status: DISCONTINUED | OUTPATIENT
Start: 2023-03-30 | End: 2023-03-30 | Stop reason: HOSPADM

## 2023-03-30 RX ORDER — NITROFURANTOIN 25; 75 MG/1; MG/1
100 CAPSULE ORAL 2 TIMES DAILY
Qty: 10 CAPSULE | Refills: 0 | Status: SHIPPED | OUTPATIENT
Start: 2023-03-30 | End: 2023-04-04

## 2023-03-30 RX ORDER — CIPROFLOXACIN 250 MG/1
250 TABLET, FILM COATED ORAL 2 TIMES DAILY
Qty: 10 TABLET | Refills: 0 | Status: SHIPPED | OUTPATIENT
Start: 2023-03-30 | End: 2023-04-04

## 2023-03-30 RX ORDER — ONDANSETRON 2 MG/ML
INJECTION INTRAMUSCULAR; INTRAVENOUS AS NEEDED
Status: DISCONTINUED | OUTPATIENT
Start: 2023-03-30 | End: 2023-03-30

## 2023-03-30 RX ORDER — CIPROFLOXACIN 2 MG/ML
400 INJECTION, SOLUTION INTRAVENOUS ONCE
Status: COMPLETED | OUTPATIENT
Start: 2023-03-30 | End: 2023-03-30

## 2023-03-30 RX ORDER — HYDROMORPHONE HCL IN WATER/PF 6 MG/30 ML
0.2 PATIENT CONTROLLED ANALGESIA SYRINGE INTRAVENOUS
Status: DISCONTINUED | OUTPATIENT
Start: 2023-03-30 | End: 2023-03-30 | Stop reason: HOSPADM

## 2023-03-30 RX ORDER — SODIUM CHLORIDE, SODIUM LACTATE, POTASSIUM CHLORIDE, CALCIUM CHLORIDE 600; 310; 30; 20 MG/100ML; MG/100ML; MG/100ML; MG/100ML
125 INJECTION, SOLUTION INTRAVENOUS CONTINUOUS
Status: DISCONTINUED | OUTPATIENT
Start: 2023-03-30 | End: 2023-03-30 | Stop reason: HOSPADM

## 2023-03-30 RX ORDER — LIDOCAINE HYDROCHLORIDE 20 MG/ML
INJECTION, SOLUTION EPIDURAL; INFILTRATION; INTRACAUDAL; PERINEURAL AS NEEDED
Status: DISCONTINUED | OUTPATIENT
Start: 2023-03-30 | End: 2023-03-30

## 2023-03-30 RX ORDER — FENTANYL CITRATE 50 UG/ML
INJECTION, SOLUTION INTRAMUSCULAR; INTRAVENOUS AS NEEDED
Status: DISCONTINUED | OUTPATIENT
Start: 2023-03-30 | End: 2023-03-30

## 2023-03-30 RX ADMIN — CIPROFLOXACIN 400 MG: 2 INJECTION, SOLUTION INTRAVENOUS at 06:06

## 2023-03-30 RX ADMIN — PROPOFOL 100 MG: 10 INJECTION, EMULSION INTRAVENOUS at 07:34

## 2023-03-30 RX ADMIN — MIDAZOLAM 2 MG: 1 INJECTION INTRAMUSCULAR; INTRAVENOUS at 07:26

## 2023-03-30 RX ADMIN — FENTANYL CITRATE 25 MCG: 50 INJECTION, SOLUTION INTRAMUSCULAR; INTRAVENOUS at 07:39

## 2023-03-30 RX ADMIN — SODIUM CHLORIDE, SODIUM LACTATE, POTASSIUM CHLORIDE, AND CALCIUM CHLORIDE: .6; .31; .03; .02 INJECTION, SOLUTION INTRAVENOUS at 07:29

## 2023-03-30 RX ADMIN — FENTANYL CITRATE 25 MCG: 50 INJECTION, SOLUTION INTRAMUSCULAR; INTRAVENOUS at 07:34

## 2023-03-30 RX ADMIN — LIDOCAINE HYDROCHLORIDE 100 MG: 20 INJECTION, SOLUTION EPIDURAL; INFILTRATION; INTRACAUDAL; PERINEURAL at 07:34

## 2023-03-30 RX ADMIN — FENTANYL CITRATE 25 MCG: 50 INJECTION, SOLUTION INTRAMUSCULAR; INTRAVENOUS at 07:50

## 2023-03-30 RX ADMIN — FENTANYL CITRATE 25 MCG: 50 INJECTION, SOLUTION INTRAMUSCULAR; INTRAVENOUS at 07:47

## 2023-03-30 RX ADMIN — SODIUM CHLORIDE, SODIUM LACTATE, POTASSIUM CHLORIDE, AND CALCIUM CHLORIDE 125 ML/HR: .6; .31; .03; .02 INJECTION, SOLUTION INTRAVENOUS at 06:18

## 2023-03-30 RX ADMIN — ONDANSETRON 4 MG: 2 INJECTION INTRAMUSCULAR; INTRAVENOUS at 07:37

## 2023-03-30 NOTE — TELEPHONE ENCOUNTER
----- Message from Mayito Weston MD sent at 3/30/2023  8:10 AM EDT -----  Patient has an indwelling right stent without a string  She needs follow-up with advanced practitioner in 3 months

## 2023-03-30 NOTE — OP NOTE
OPERATIVE REPORT  PATIENT NAME: Maryjo Bedolla    :  1955  MRN: [id number]  Pt Location: [facility] 01    SURGERY DATE: 3/30/2023    Surgeon(s) and Role:     * Zee Bliss MD - Primary    Preop Diagnosis:  Hydronephrosis of right kidney [N13 30]  Stage Ib endometrial carcinoma    Post-Op Diagnosis Codes: * Hydronephrosis of right kidney [N13 30]   Stage Ib endometrial carcinoma    Procedure(s):  Right - CYSTOSCOPY RETROGRADE PYELOGRAM WITH exchange of STENT URETERAL  Right - EXCHANGE STENT URETERAL    Specimen(s):  ID Type Source Tests Collected by Time Destination   A : urine culture  Urine Urine, Cystoscopic URINE CULTURE Zee Bliss MD 3/30/2023 7311        Estimated Blood Loss:   Minimal    Drains:  Ureteral Internal Stent Right ureter 6 Fr  (Active)   Number of days: 0       Anesthesia Type:   General    Operative Indications:  Hydronephrosis of right kidney [N13 30]      Operative Findings:  Narrowing of the distal right ureter  Minimal hydronephrosis  Routine stent exchange  Right 22-6 Kuwaiti double-J ureteral stent without string    Complications:   None    Procedure and Technique: Agustin Tabor is a 70year old female with a history of stage Ib endometrial carcinoma  She presents to the operating room on 2023 to undergo exchange of the right ureteral stent  After the smooth induction of general LMA anesthesia, the patient was placed in the dorsal lithotomy position  Her genitalia was prepped and draped in a sterile fashion  Intravenous antibiotics were administered  A timeout was performed with all members of the operative team and from the patient's identity, procedure to be performed, and laterality of the case  A 22 Kuwaiti rigid cystoscope with 30° lens was inserted  The bladder was thoroughly inspected  A urine culture was obtained  There was mild erythema adjacent to the right ureteral orifice where the stent was visualized    The remainder of the bladder was free and clear mucosal abnormalities or lesions  The stent was grasped and presented to the urethral meatus  A wire was inserted through the stent and the stent was removed  A 5 Jamaican open-ended catheter was then placed over top of the wire  Retrograde pyelography was performed  There was narrowing of the distal ureter with no hydronephrosis         The wire was then repassed and secured into an upper pole calyx  A right 22-6 [place] double-J ureteral stent was then placed in the standard fashion  The proximal coil was appreciated renal pelvis and the distal coil was visualized within the bladder  There was no string left in place  The bladder was emptied and the cystoscope was removed        Patient Disposition:  PACU stable and extubated        SIGNATURE: Elke Peng MD  DATE: March 30, 2023  TIME: 7:56 AM

## 2023-03-30 NOTE — TELEPHONE ENCOUNTER
Patient still inpatient tentatively schedule for 6/22 @ 3:15 with Guicho Schuster in [facility] office

## 2023-03-30 NOTE — ANESTHESIA PREPROCEDURE EVALUATION
Procedure:  CYSTOSCOPY RETROGRADE PYELOGRAM WITH exchange of STENT URETERAL (Right: Bladder)  EXCHANGE STENT URETERAL (Right: Bladder)    Relevant Problems   CARDIO   (+) Acute deep vein thrombosis (DVT) of proximal vein of lower extremity (HCC)   (+) Benign essential hypertension   (+) Exertional dyspnea      /RENAL   (+) Chronic kidney disease, stage 3a (HCC)   (+) Nephrotoxicity      GYN   (+) Endometrial cancer (HCC)      HEMATOLOGY   (+) Anemia   (+) Drug-induced anemia   (+) Iron deficiency anemia secondary to inadequate dietary iron intake      MUSCULOSKELETAL   (+) Acute left-sided low back pain without sciatica   (+) Bilateral piriformis syndrome   (+) Myofascial pain syndrome      NEURO/PSYCH   (+) Chronic pain syndrome   (+) Continuous opioid dependence (HCC)   (+) Depression, recurrent (HCC)   (+) Encounter for follow-up surveillance of endometrial cancer   (+) Major depression, chronic   (+) Myofascial pain syndrome      PULMONARY   (+) Exertional dyspnea        H/o difficult intubation, pt denies, h/o difficultly passing TRINH probe  Prev Anes - Diagnostic Lap 12/19/17: Grade 1 view with MAC 3 blade, MV not attempted (RSI)       Physical Exam    Airway    Mallampati score: I  TM Distance: >3 FB  Neck ROM: full     Dental   No notable dental hx     Cardiovascular  Rhythm: regular, Rate: normal, No murmur, Cardiovascular exam normal    Pulmonary  Comment: Decreased breath sounds on right side compared to left, No rhonchi, Decreased breath sounds, No wheezes, No rales, No stridor    Other Findings        Anesthesia Plan  ASA Score- 3     Anesthesia Type- general with ASA Monitors  Additional Monitors:   Airway Plan: LMA  Plan Factors-    Chart reviewed  Existing labs reviewed  Patient summary reviewed  Induction- intravenous  Postoperative Plan- Plan for postoperative opioid use   Planned trial extubation    Informed Consent- Anesthetic plan and risks discussed with patient  I personally reviewed this patient with the CRNA  Discussed and agreed on the Anesthesia Plan with the CRNA  Anahi Barry

## 2023-03-30 NOTE — H&P
"Assessment  History of right hydronephrosis secondary to endometrial carcinoma, status post right nephrostomy tube insertion        Discussion  Fortunately she is doing well with her right double-J ureteral stent which has been internalized  In addition her disease appears to be stable on immunotherapy  She will continue to follow with gynecological oncology who she recently saw and is stable  Plan today for cystoscopy with right retrograde pyelography and routine right ureteral stent exchange  Pending the results of the right retrograde we can make a decision regarding possible removal of the stent in the outpatient setting      History of Present Illness  74 y o  female with a history of recurrent stage I B endometrial carcinoma  She follows with gynecological oncology  She is receiving immunotherapy  She recently returned to Interventional Radiology on December 20, 2022 and underwent conversion of her nephroureteral stent to a double-J stent  She has not had an issue since that time  She is very pleased that she does not have a bag external to drain urine  Her most recent CT scan from March 15, 2023 shows no evidence of progressive metastatic disease within the chest abdomen or pelvis  /70   Pulse 86   Temp 98 6 °F (37 °C) (Temporal)   Resp 18   Ht 4' 11\" (1 499 m)   Wt 56 2 kg (124 lb)   LMP  (LMP Unknown)   SpO2 100%   BMI 25 04 kg/m²     On examination she is in no acute distress  Respiratory no distress  Cardiac regular  Abdomen soft nontender nondistended  Skin is warm    Extremities without edema  "

## 2023-03-30 NOTE — ANESTHESIA POSTPROCEDURE EVALUATION
Post-Op Assessment Note    CV Status:  Stable    Pain management: adequate     Mental Status:  Sleepy   Hydration Status:  Euvolemic   PONV Controlled:  Controlled   Airway Patency:  Patent      Post Op Vitals Reviewed: Yes      Staff: CRNA, Anesthesiologist         No notable events documented      BP   115/63   Temp 97 6 °F (36 4 °C) (03/30/23 0758)    Pulse  67   Resp   18   SpO2 99 % (03/30/23 0758)

## 2023-03-31 DIAGNOSIS — N18.31 STAGE 3A CHRONIC KIDNEY DISEASE (HCC): Primary | ICD-10-CM

## 2023-03-31 NOTE — TELEPHONE ENCOUNTER
Post Op Note    Live Aparicio is a 74 y o  female s/p  Cysto stent performed 03//30/2023  Live Aparicio is a patient of Dr Malinda Cleveland and is seen at the [place] office  How would you rate your pain on a scale from 1 to 10, 10 being the worst pain ever? 1  Have you had a fever? No  If so, what was your highest t  Have your bowel movements been regular? No  Do you have any difficulty urinating? No urine is a pinky color - told her to hydrate with water   Do you have any other questions or concerns that I can address at this time?  6/22 @ 3:15 scheduled for f/u to discuss next exchange

## 2023-04-02 LAB
BACTERIA UR CULT: ABNORMAL

## 2023-04-04 DIAGNOSIS — Z45.2 ENCOUNTER FOR CENTRAL LINE CARE: ICD-10-CM

## 2023-04-04 DIAGNOSIS — D50.8 IRON DEFICIENCY ANEMIA SECONDARY TO INADEQUATE DIETARY IRON INTAKE: ICD-10-CM

## 2023-04-04 DIAGNOSIS — C54.1 ENDOMETRIAL CANCER (HCC): Primary | ICD-10-CM

## 2023-04-04 DIAGNOSIS — R79.0 ABNORMAL IRON SATURATION: ICD-10-CM

## 2023-04-04 RX ORDER — SODIUM CHLORIDE 9 MG/ML
20 INJECTION, SOLUTION INTRAVENOUS ONCE
Status: CANCELLED | OUTPATIENT
Start: 2023-04-06

## 2023-04-21 ENCOUNTER — APPOINTMENT (OUTPATIENT)
Dept: LAB | Facility: CLINIC | Age: 68
End: 2023-04-21

## 2023-04-24 ENCOUNTER — APPOINTMENT (OUTPATIENT)
Dept: PHYSICAL THERAPY | Facility: REHABILITATION | Age: 68
End: 2023-04-24

## 2023-04-25 ENCOUNTER — OFFICE VISIT (OUTPATIENT)
Dept: PHYSICAL THERAPY | Facility: REHABILITATION | Age: 68
End: 2023-04-25

## 2023-04-25 ENCOUNTER — APPOINTMENT (OUTPATIENT)
Dept: LAB | Facility: CLINIC | Age: 68
End: 2023-04-25

## 2023-04-25 DIAGNOSIS — R80.9 NEPHROTIC RANGE PROTEINURIA: ICD-10-CM

## 2023-04-25 DIAGNOSIS — R32 INCONTINENCE IN FEMALE: Primary | ICD-10-CM

## 2023-04-25 DIAGNOSIS — N18.31 STAGE 3A CHRONIC KIDNEY DISEASE (HCC): ICD-10-CM

## 2023-04-25 DIAGNOSIS — R39.15 URINARY URGENCY: ICD-10-CM

## 2023-04-25 LAB
CREAT UR-MCNC: 13.8 MG/DL
PROT UR-MCNC: 238 MG/DL
PROT/CREAT UR: 17.25 MG/G{CREAT} (ref 0–0.1)

## 2023-04-25 NOTE — PROGRESS NOTES
PT Re-Evaluation     Today's date: 2023  Patient name: Mckenna Lyons  : 1955  MRN: [id number]  Referring provider: RAFITA Duque  Dx:   Encounter Diagnosis     ICD-10-CM    1  Incontinence in female  R32       2  Urinary urgency  R39 15           Start Time: 1200  Stop Time: 1255  Total time in clinic (min): 55 minutes    Assessment  Assessment details: The patient is a 74 y o  female with complaints of urinary frequency/urgency, and incontinence  Her history includes endometrial cancer  She has undergone chemotherapy two times, most recently from 2021 to Dec 2022, as well as radiation  She also had surgery to place a stent in her right ureter and recently had this stent replaced  She reports that she developed weakness in her right LE after blood clots were found and the stent was placed  She also has constipation as well  She has been trying to increase her dietary fiber to help with this  She does strain at times to go to the bathroom  She notes that her urinary incontinence is worst with going from sitting to standing but has actually improved a bit since her stent was replaced in about one Month ago  She is able to better isolate her pelvic floor muscles since beginning pelvic floor PT  She does continue to demonstrate weakness and limited endurance of her pelvic floor muscles  Education and counseling provided today  She does also exhibit skin irritation and has redness and also is tender in this region  Recommended that she call her Urologist or GYN oncologist in regards to this to avoid skin breakdown  It is most likely due to incontinence and wearing pads  She would benefit from continuing pelvic floor physical therapy to help reduce/manage pain and symptoms, address impairments and maximize function and quality of life upon discharge  female will be given updated HEP throughout episode of care   Thank you for the referral     Therapeutic activities performed upon examination included education regarding pelvic floor anatomy, explanation of exam technique, explanation of exam findings and discussion of treatment plan as well as expectations of the patient to emphasize the importance of compliance and adherence to physical therapy visits  Impairments: abnormal coordination, abnormal or restricted ROM, activity intolerance, impaired physical strength, lacks appropriate home exercise program, pain with function, poor posture  and poor body mechanics  Understanding of Dx/Px/POC: good  Goals  ST  The patient will improve PFM strength by 1 to 2 grades in 12 weeks  - not met  2  The patient will improve PFM endurance to 3 to 5 seconds in 12 weeks  - not met  3  The patient will reduce pads by 1 to 2 a day in 12 weeks  - not met    LT  The patient will reduce pad utilization by 50% upon discharge  2  The patient will reduce frequency of incontinence episodes with sit to stand transfers by 50% upon discharge  Plan  Patient would benefit from: skilled physical therapy  Planned modality interventions: biofeedback  Planned therapy interventions: activity modification, abdominal trunk stabilization, behavior modification, body mechanics training, breathing training, coordination, flexibility, functional ROM exercises, IADL retraining, manual therapy, neuromuscular re-education, patient education, postural training, self care, strengthening, therapeutic activities and therapeutic exercise  Frequency: 1x week  Duration in visits: 12  Duration in weeks: 12  Plan of Care beginning date: 2023  Plan of Care expiration date: 2023  Treatment plan discussed with: patient        PT Pelvic Floor Subjective:   History of Present Illness: The patient had a right ureteral stent replaced almost one month ago  She reports that this went well overall  She has been tired  She finished her infusions recently as well   She notes that last week she had some trouble eating because she wasn't hungry  She has been able to eat better/more over the past few days  She is wondering if that is why she has felt more tired/weak  She sees the nephrologist on Friday  She is doing a 24 hour urine collection in preparation for this  The patient reports that in regards to her urinary incontinence her symptoms have been better  She notes that the Everlean Lapping seems to be effective and helping her to less frequent episodes of leakage  She notes that it does occur daily 5-6 times a day  She notes that she does still experience leakage when going from sitting to standing  This can range from a small to a large amount  It depends on what she has had to drink  She notes that she could be sitting for just a few minutes and she will leak when she goes back into standing  Social Support:     Lives in:   [place] with: lives with 80year old mother; she is her caretaker  Work status: retired    History of Depression: yes    treated for her depression with medication  Diet and Exercise:      none  OB/ gyn History    Gestational History:     Prior Pregnancy: No      Delivery Complications:  GYN history:  None other then hysterectomy and cancer    Menstrual History:      Menopause: went though menopause in 44's  Bladder Function:     Voiding Difficulties positive for: urgency      Voiding Difficulties negative for: frequent urination, hesitancy, straining and incomplete emptying (sometimes)      Voiding Difficulties comments:     Urinary leakage: urine leakage    Urinary leakage aggravated by: coughing, sneezing, standing up and walking to the bathroom    Urinary leakage not aggravated by: bending    Intake (ounces):      Intake (ounces) comment: 32 ounces of iced tea (sweetened and caffeinated) in the morning  Water after: 2-3 bottles a day (more at night)  More iced tea around 2-3 o'clock  Drinks [employer] Milk  No coffee  No soda  Incontinence Management:     Pads/Diaper Use:  24 hours Pads/Diapers Additional Comments: she is going through 5 pads and Depends a day; needs them more at night as incontinence is worse at night  Bowel Function:     Voiding DIfficulties: painful defecating and unfinished feeling after defecating      Bowel Function comments:  Dietician recommended patient try prunes + applesauce + bran to help increase fiber and promote more regular bowel movement (eats 1 Tablespoon at night)  Stools are sticky  Pain with emptying  Straining to empty  Bleeding with emptying  Incomplete emptying sensation    Stool frequency: 1-2 times a week  Sexual Function:     Sexually Active:  Not sexually active  Pain:     Location:  Only when she hasnt had a bowel movement some pain and cramping lower abdominal region      Objective   Pelvic Floor Exam     General Perineum Exam:   perineum intact  Positive for perianal erythema (red/purple, dry patches of skin; labia majora, minora; irritation from wetness of pad against skin - advised patient to call physician in regards to this)     Negative for swelling, descent, lesion, rectal irritation and gaping introitus    General perineum exam comments: Pelvic floor verbal consent and written consent signed and in chart    Education provided today:   Time Spent on Patient Education: 15 min    Pelvic floor anatomy and function  Physiology/relationship of abdominal canister and pelvis/pelvic organs/pelvic floor muscles  Bowel and Bladder anatomy and function  Skin and skin check - using mirror to show patient    Toileting posture and body mechanics  Constipation Education    Hormonal and MSK changes during pregnancy   Post partum posture and MSK changes  Hormonal changes during and after menopause  Surgical and post op considerations  PT exam and course of treatment       Future Education To be Provided:     Visual Inspection of Perineum:   Excursion of perineal body in cephalad direction with contraction of pelvic floor muscles (PFM): weak and fair Excursion of perineal body in caudal direction with relaxation of pelvic floor muscles (PFM): fair   Involuntary contraction with coughing: no  Involuntary relaxation with bearing down: no  Sensation: intact    Pelvic Organ Prolapse   no pelvic organ prolapse    Pelvic Floor Muscle Exam     Palpation   No increased muscle tension in the pubococcygeus, iIliococcygeus, obturator internus and periurethral  Moderate increased muscle tension in the bulbospongiosus, ischiocavernosus, super transverse perineal and puborectalis  No tenderness on right in the bulbospongiosus, ischiocavernosus, super transverse perineal, puborectalis, pubococcygeus, iliococcygeus, obturator internus and periurethral  No tenderness on left in the bulbospongiosus, ischiocavernosus, super transverse perineal, puborectalis, pubococcygeus, iliococcygeus, obturator internus and periurethral    Muscle Contraction: substitution and adductors; improved isolation and muscle fatigue quickly  Pelvic floor muscle relaxation is complete       PERFECT Score   Power right: 1/5  Power left: 1/5               Precautions: history of endometrial cancer, chemotherapy and radiation; history of R LE blood clots; R ureteral stent  CAVI [employer] HEP    Manuals 2/6 2/13 2/27 3/9 3/16 3/23 4/25                                                          Neuro Re-Ed             Diaphragmatic Breathing             TA ADIM             TA + PFMC             Internal muscle cueing    10 min 15 min 15 min 10 min      Biofeedback sEMG   30 min 30 min total         PFMC: Slow Holds             PFMC: Quick Flicks   [id number]  supine 4x5 supine         PFMC + hip abd isometrics             PFMC + hip add isometrics             PFMC + ecc bridge             PFMC in sitting   10x           PFMC seated + tilt             PFMC in standing             PFMC in Quadruped                                                                 Ther Ex             PPT             Bridge + SLR Bridge + marching                                                                              Ther Activity             EDUCATION  40 min incuding bladder log review and counseling 25 min 5 min 30 min 30 min 40 min      PFMC + reach             PFMC + squat             PFMC + lift             PFMC + sit to stand             PFMC + step up/down             Gait Training                                       Modalities

## 2023-04-26 ENCOUNTER — APPOINTMENT (OUTPATIENT)
Dept: LAB | Facility: CLINIC | Age: 68
End: 2023-04-26

## 2023-04-27 ENCOUNTER — APPOINTMENT (OUTPATIENT)
Dept: LAB | Facility: CLINIC | Age: 68
End: 2023-04-27

## 2023-04-27 DIAGNOSIS — R80.9 NEPHROTIC RANGE PROTEINURIA: ICD-10-CM

## 2023-04-27 LAB
CREAT 24H UR-MRATE: 0.1 G/24HR (ref 0.6–1.8)
PROT 24H UR-MCNC: 615 MG/24 HRS (ref 40–150)
SPECIMEN VOL UR: 375 ML
SPECIMEN VOL UR: 375 ML

## 2023-04-28 ENCOUNTER — OFFICE VISIT (OUTPATIENT)
Dept: NEPHROLOGY | Facility: CLINIC | Age: 68
End: 2023-04-28

## 2023-04-28 VITALS
WEIGHT: 116 LBS | SYSTOLIC BLOOD PRESSURE: 102 MMHG | DIASTOLIC BLOOD PRESSURE: 72 MMHG | HEIGHT: 59 IN | BODY MASS INDEX: 23.39 KG/M2

## 2023-04-28 DIAGNOSIS — N18.31 STAGE 3A CHRONIC KIDNEY DISEASE (HCC): ICD-10-CM

## 2023-04-28 DIAGNOSIS — R80.9 NEPHROTIC RANGE PROTEINURIA: ICD-10-CM

## 2023-04-28 DIAGNOSIS — N25.81 SECONDARY HYPERPARATHYROIDISM (HCC): ICD-10-CM

## 2023-04-28 DIAGNOSIS — N17.9 AKI (ACUTE KIDNEY INJURY) (HCC): Primary | ICD-10-CM

## 2023-04-28 DIAGNOSIS — Z87.448 HISTORY OF HYDRONEPHROSIS: ICD-10-CM

## 2023-04-28 NOTE — PATIENT INSTRUCTIONS
You still have significant amount of protein in the urine  We will do some blood tests and urine test for further evaluation  We will also plan to do a nuclear medicine study after discussion with your urologist to assess the kidney function from each kidney  If you have good kidney function from your right side, we will proceed with biopsy on the left side for further evaluation of protein spillage in the urine  We will bring you back to nephrology office in 3 months

## 2023-04-28 NOTE — PROGRESS NOTES
NEPHROLOGY OFFICE VISIT   Neeta Melendez 74 y o  female MRN: [id number]  4/28/2023    Reason for Visit: Nephrotic range proteinuria    ASSESSMENT and PLAN:  It was a pleasure evaluating your patient in the office today  Thank you for allowing our team to participate in the care of Ms Ragini Melendez  Please do not hesitate to contact our team if further issues/questions shall arise in the interim       # Nephrotic range Proteinuria  >>Workup   - Urine albumin to creatinine ratio 150 milligram/gram in 2017  - Urine protein to creatinine ratio almost 1 g in 12/2020 before initiating Avastin  - Urine protein to creatinine ratio started rising in 06/2022 and subsequently Avastin was held after last dose on 09/09/2022  - Unfortunately urine protein to creatinine ratio keeps rising and most recently 17 g  - Urine albumin to creatinine ratio most recently 1 5 g does not correlate with proteinuria but this cannot be isolated tubular proteinuria as amount of proteinuria is very high  - 24-hour urine protein 615 mg (urine creatinine was 0 1 in that sample and total urine volume was 375 mL incomplete collection)  - Urinalysis with moderate blood, large leukocytes, 2+ protein, 30-50 RBC, innumerable WBCs, innumerable bacteria in setting of right-sided nephroureteral stent  - Serum albumin 3 5-3 8, mild elevation in LDL  - No clinical evidence of nephrotic syndrome  - HbA1c 5 2, MARY ANNE 2 R-  - CHERI 1:80, dsDNA negative, no hypocomplementemia (no clinical evidence of lupus)  - Hep B/hep C/HIV negative  - SPEP no monoclonal band, serum free light chain ratio 1 5 (no evidence of monoclonal gammopathy)     >>Etiology  - Worsening of proteinuria is related to use of VEGF inhibitor (based on literature review proteinuria can continue for several months even after stopping the medication) but she can have baseline glomerular pathology as proteinuria dates back to 2017  - Differential diagnosis includes proteinuria secondary to VEGF inhibitor (auto cytopathy versus TMA) versus membranous nephropathy in setting of malignancy (however malignancy is in remission) versus adaptive FSGS versus minimal-change disease     >> Plan  - She has not tolerated RAAS inhibition due to low blood pressure (systolic blood pressure in 80s to 90s) and therefore would keep lisinopril on hold  - Given the fact that her right kidney is compromised (right-sided hydronephrosis with ureteral stent in place with atrophy) and most of her kidney function is probably from the left side would keep a high threshold for kidney biopsy as a bad outcome on left kidney postbiopsy would leave her with very low kidney function  - We will perform NM scan to see how much of kidney function is coming from the right side and if there is significant function coming from the right side, we will go ahead and perform the kidney biopsy on the left side  - Avastin has been on hold and there are no plans to restart the drug at this time  - Currently no evidence of peripheral TMA as there is no thrombocytopenia, check CBC/LDH/haptoglobin/peripheral smear to complete work-up  - Check rheumatoid factor/cryoglobulins  - Repeat SPEP/serum free light chain ratio     # Recurrent stage I B endometrial cancer  - Diagnosis was made in 2017 and she underwent total abdominal hysterectomy and bilateral salpingo oophorectomy   - Status post ENDOBARR (rucaparib, atezolizumab, and bevacizumab) clinical trial started in 12/2020 and finished 12/2022  - She is currently under active surveillance     # Acute kidney injury chronic Kidney Disease Stage III-A  - Etiology/risk factors: Hypertension, obstructive uropathy, underlying glomerular pathology, age-related nephron loss   - Baseline Cr: 0 9-1 1, most recently 1 6 03/2023  - Urinalysis with large blood, innumerable RBCs, innumerable WBCs, moderate bacteriuria in setting of right-sided nephroureteral stent  - Proteinuria: As above   - Imaging: Decreased size of right kidney subcapsular fluid collection, right nephroureteral stent in place, no hydronephrosis, ureteritis of left renal collecting system and right renal pelvis, stable cystitis 03/2023  - Nephroureteral stent was exchanged after most recent elevation in creatinine and there has been no follow-up BMP  - Check BMP now  - Continue to hold RAAS blockade with aim to resume once kidney function back to baseline  - Discussed avoidance of NSAIDs due to their short-term and long-term effects on kidney function     # History of right hydronephrosis   - Secondary to endometrial carcinoma, status post right nephrostomy tube with later conversion to nephroureteral stent   - 09/2022: Successful exchange of 10 [place] 24 cm nephroureteral stent under fluoroscopic control, Catheter was capped  - 12/2022: Right nephroureteral stent was internalized to a double-J right stent  - 03/2022: Nephroureteral stent exchanged by urology  - Continue follow-up with urology     # Hypertension/Volume   - Goal BP <120/80 per KDIGO guidelines   - Volume status: Euvolemic  - Status: Blood pressure currently on low side  - Current antihypertensive regimen: Lisinopril is currently on hold due to borderline low blood pressure and MONROE     # Anemia  - Target Hb: More than 10 g/dL  - Most recent hemoglobin: 9 4 g/dL  - Iron saturation 14, ferritin 467 01/2023  - Status post IV iron supplementation with oncology  - No KENNEY given malignancy     # Electrolytes/Acid Base status   - Electrolytes/acid-base status currently stable     # CKD Mineral and Bone Disorder   - Goal Ca 8 5-10 mg/dL, goal Phos 2 7-4 6 mg/dL, goal iPTH 30-70 pg/mL  - She has an elevated PTH (secondary hyperparathyroidism) but also has vitamin-D insufficiency with level of around 25-27 despite being on vitamin-D supplementation with 2000 units daily  - Her vitamin D dose was increased to 4000 units by her PCP  - Mildly elevated serum phosphate but at goal for chronic kidney disease    # Vitamin D insufficiency  - Most recent vitamin D level around 27, cholecalciferol dose increased to 4000 units by her PCP     # Other issues   - History of DVT on Lovenox  - History of gastric bypass in 2001      HPI:  Since last visit:  She had her right-sided nephroureteral stent exchanged  She has poor appetite and is on Marinol for appetite stimulation  She denies leg swelling  She denies facial swelling  She denies dyspnea  She has intermittent dysuria  She has urinary incontinence  Ragini Melendez is a 74 y  o  female who has history of endometrial cancer status post total abdominal hysterectomy and bilateral salpingo-oophorectomy, status post chemotherapy with Taxol and carboplatin in 2019, currently onrucaparib, atezolizumab, and bevacizumab  Rosy Po was held on last 2 cycles due to worsening proteinuria  Susan Geller has history of obesity for which he underwent gastric bypass surgery in 2001   She gained weight and was requiring metformin for prediabetes and quinapril for hypertension before she was diagnosed with endometrial cancer   Since diagnosis of endometrial cancer she has lost significant amount of weight; anti diabetic and antihypertensive medications were stopped        >> Major risk factors for CKD  - Diabetes: Previously pre-diabetic on Metformin  - Hypertension: Previous history of hypertension but not on any medications   - Age ? 57 years:  Ronen Barth  - Family history of kidney disease: Father had kidney stones, his cousin was on dialysis   - Obesity or metabolic syndrome: Yes      >> Medical history evaluation   - Prior kidney disease or dialysis: No  - Incidental hematuria in the past: Yes with ureteral stent in place   - Urinary symptoms: Urinary incontinence   - History of foamy or frothy urine: No   - History of nephrolithiasis: Yes but never passed a stone   - Diseases that share risk factors with CKD: DM, HTN  - Systemic diseases that might affect kidney: No   - History of use of "medications that might affect renal function: No    PATIENT INSTRUCTIONS:    Patient Instructions   You still have significant amount of protein in the urine  We will do some blood tests and urine test for further evaluation  We will also plan to do a nuclear medicine study after discussion with your urologist to assess the kidney function from each kidney  If you have good kidney function from your right side, we will proceed with biopsy on the left side for further evaluation of protein spillage in the urine  We will bring you back to nephrology office in 3 months  OBJECTIVE:  Current Weight: Weight - Scale: 52 6 kg (116 lb)  Vitals:    04/28/23 1048   BP: 102/72   BP Location: Right arm   Patient Position: Sitting   Cuff Size: Standard   Weight: 52 6 kg (116 lb)   Height: 4' 11\" (1 499 m)    Body mass index is 23 43 kg/m²  REVIEW OF SYSTEMS:    Review of Systems   Constitutional: Positive for appetite change  Negative for chills and fever  HENT: Negative for ear pain and sore throat  Eyes: Negative for pain and visual disturbance  Respiratory: Negative for cough and shortness of breath  Cardiovascular: Negative for chest pain and palpitations  Gastrointestinal: Negative for abdominal pain and vomiting  Genitourinary: Positive for dysuria  Negative for hematuria  Musculoskeletal: Negative for arthralgias and back pain  Skin: Negative for color change and rash  Neurological: Negative for seizures and syncope  All other systems reviewed and are negative        Past Medical History:   Diagnosis Date   • Anxiety    • CKD (chronic kidney disease) stage 3, GFR 30-59 ml/min ([facility])    • COVID     Fall 2022   • Depression    • Endometrial cancer ([facility] [address] ) 12/2017   • GERD (gastroesophageal reflux disease)    • H/O gastric bypass    • Hard to intubate     pt denies   • History of chemotherapy     started 12/2021endometrial cancer- done 12/23/22   • History of DVT in adulthood     RLE   • " Hyperglycemia     vx type 2 dm -- last assessed 4/1/14; resolved 11/7/17   • Hyperlipidemia    • Hypertension     in past- no meds at present   • Iron deficiency anemia     iron infusions weekly   • OAB (overactive bladder)    • Port-A-Cath in place    • Wears glasses        Past Surgical History:   Procedure Laterality Date   • ABDOMINAL SURGERY      GASTRIC BYPASS; 2001   • BREAST BIOPSY Right 06/28/2019    core biopsy; benign   • CHOLECYSTECTOMY      at the time of gastric bypass   • COLONOSCOPY     • CT NEEDLE BIOPSY LYMPH NODE  07/08/2019   • FL GUIDED CENTRAL VENOUS ACCESS DEVICE INSERTION  11/12/2019   • FL RETROGRADE PYELOGRAM  3/30/2023   • GASTRIC BYPASS     • HYSTERECTOMY Bilateral 2017    total abdominal with salpingo-oophorectomy   • IR NEPHROSTOMY TO NEPHROURETERAL STENT  06/09/2022   • IR NEPHROSTOMY TO NEPHROURETERAL STENT  12/20/2022   • IR NEPHROSTOMY TUBE CHECK/CHANGE/REPOSITION/REINSERTION/UPSIZE  05/27/2022   • IR NEPHROSTOMY TUBE PLACEMENT  07/26/2019   • IR NEPHROURETERAL STENT CHECK/CHANGE/REPOSITION  04/06/2021   • IR NEPHROURETERAL STENT CHECK/CHANGE/REPOSITION  06/04/2021   • IR NEPHROURETERAL STENT CHECK/CHANGE/REPOSITION  09/03/2021   • IR NEPHROURETERAL STENT CHECK/CHANGE/REPOSITION  12/07/2021   • IR NEPHROURETERAL STENT CHECK/CHANGE/REPOSITION  03/08/2022   • IR NEPHROURETERAL STENT CHECK/CHANGE/REPOSITION  05/10/2022   • IR NEPHROURETERAL STENT CHECK/CHANGE/REPOSITION  09/19/2022   • IR PICC LINE  09/27/2019   • IR PORT PLACEMENT  07/26/2019   • IR PORT REMOVAL  09/20/2019   • OOPHORECTOMY Bilateral 2017   • SD CYSTO BLADDER W/URETERAL CATHETERIZATION Right 3/30/2023    Procedure: CYSTOSCOPY RETROGRADE PYELOGRAM WITH exchange of STENT URETERAL;  Surgeon: Ashley Ching MD;  [facility];  Service: Urology   • SD CYSTO W/INSERT URETERAL STENT Right 3/30/2023    Procedure: EXCHANGE STENT URETERAL;  Surgeon: Ashley Ching MD;  [facility];  Service: Urology   • NJ INSJ TUNNELED CTR VAD W/SUBQ PORT AGE 5 YR/> Left 11/12/2019    Procedure: INSERTION VENOUS PORT ( PORT-A-CATH) IR;  Surgeon: Sarah Shaver DO;  [facility];  Service: Interventional Radiology   • NJ LAPS ABD PRTM&OMENTUM DX W/WO SPEC BR/WA SPX N/A 12/19/2017    Procedure: LAPAROSCOPY DIAGNOSTIC;  Surgeon: Lionel Gastelum MD;  [facility];  Service: Gynecology Oncology   • NJ LAPS W/RAD HYST W/BILAT LMPHADEC RMVL TUBE/OVARY N/A 12/19/2017    Procedure: HYSTERECTOMY LAPAROSCOPIC TOTAL ([address]) W/ ROBOTICS; BILATERAL SALPINGOOPHERECTOMY; LYMPH NODE DISSECTION; lysis of adhesions;  Surgeon: Lionel Gastelum MD;  [facility];  Service: Gynecology Oncology   • TONSILLECTOMY     • US GUIDED BREAST BIOPSY RIGHT COMPLETE Right 06/28/2019       Family History   Problem Relation Age of Onset   • Hyperlipidemia Mother    • Heart disease Mother    • Ovarian cancer Mother 48   • Colon cancer Mother    • Lymphoma Father    • Breast cancer Sister 61   • No Known Problems Brother    • No Known Problems Brother    • No Known Problems Maternal Grandmother    • Bone cancer Maternal Grandfather    • Uterine cancer Paternal Grandmother    • No Known Problems Paternal Grandfather    • No Known Problems Maternal Aunt    • No Known Problems Maternal Aunt    • No Known Problems Maternal Aunt    • No Known Problems Maternal Aunt    • No Known Problems Paternal Aunt    • No Known Problems Paternal Aunt    • No Known Problems Paternal Aunt    • No Known Problems Paternal Aunt         Social History     Substance and Sexual Activity   Alcohol Use Not Currently     Social History     Substance and Sexual Activity   Drug Use No     Social History     Tobacco Use   Smoking Status Never   Smokeless Tobacco Never       PHYSICAL EXAM:      Physical Exam  Constitutional:       Appearance: Normal appearance  She is ill-appearing  HENT:      Head: Normocephalic and atraumatic        Mouth/Throat:      Mouth: Mucous membranes are moist       Pharynx: Oropharynx is clear  Cardiovascular:      Rate and Rhythm: Normal rate and regular rhythm  Pulses: Normal pulses  Heart sounds: Normal heart sounds  Pulmonary:      Effort: Pulmonary effort is normal       Breath sounds: Normal breath sounds  Abdominal:      General: Bowel sounds are normal       Palpations: Abdomen is soft  Musculoskeletal:         General: Normal range of motion  Right lower leg: No edema  Left lower leg: No edema  Skin:     General: Skin is warm and dry  Neurological:      General: No focal deficit present  Mental Status: She is alert and oriented to person, place, and time  Mental status is at baseline  Psychiatric:         Mood and Affect: Mood normal          Behavior: Behavior normal          Thought Content:  Thought content normal          Judgment: Judgment normal          Medications:    Current Outpatient Medications:   •  ARIPiprazole (ABILIFY) 20 MG tablet, Take 20 mg by mouth in the morning , Disp: , Rfl:   •  aspirin (ECOTRIN LOW STRENGTH) 81 mg EC tablet, Take 81 mg by mouth every evening, Disp: , Rfl:   •  Calcium-Magnesium-Vitamin D (CALCIUM 500 PO), Take by mouth daily , Disp: , Rfl:   •  cholecalciferol (VITAMIN D3) 1,000 units tablet, Take 4 tablets (4,000 Units total) by mouth daily, Disp: 90 tablet, Rfl: 0  •  Cranberry-Vitamin C 250-60 MG CAPS, Take 1 tablet by mouth in the morning, Disp: 90 capsule, Rfl: 3  •  Cyanocobalamin (VITAMIN B12 PO), Take by mouth daily , Disp: , Rfl:   •  dronabinol (MARINOL) 2 5 mg capsule, Take 1 capsule (2 5 mg total) by mouth 2 (two) times a day before meals, Disp: 30 capsule, Rfl: 0  •  enoxaparin (LOVENOX) 80 mg/0 8 mL, INJECT 0 8ML (80MG TOTAL)  SUBCUTANEOUSLY EVERY 24  HOURS, Disp: 72 mL, Rfl: 1  •  folic acid (FOLVITE) 1 mg tablet, Take 1 tablet (1 mg total) by mouth daily, Disp: , Rfl: 0  •  Gemtesa 75 MG TABS, TAKE 75 MG BY MOUTH IN THE MORNING, Disp: 90 tablet, Rfl: 2  •  Multiple Vitamin (MULTIVITAMIN) tablet, Take 1 tablet by mouth daily, Disp: , Rfl:   •  naloxone (NARCAN) 4 mg/0 1 mL nasal spray, Administer 1 spray into a nostril  If no response after 2-3 minutes, give another dose in the other nostril using a new spray , Disp: 1 each, Rfl: 0  •  omeprazole (PriLOSEC) 20 mg delayed release capsule, Take 20 mg by mouth daily, Disp: , Rfl:   •  oxybutynin (DITROPAN XL) 15 MG 24 hr tablet, Take 1 tablet (15 mg total) by mouth daily at bedtime, Disp: 90 tablet, Rfl: 3  •  saccharomyces boulardii (FLORASTOR) 250 mg capsule, Take 1 capsule (250 mg total) by mouth 2 (two) times a day, Disp: , Rfl: 0  •  senna (SENOKOT) 8 6 MG tablet, Take 1 tablet (8 6 mg total) by mouth 2 (two) times a day as needed for constipation, Disp: 60 tablet, Rfl: 0  •  venlafaxine (EFFEXOR) 75 mg tablet, Take 75 mg by mouth 3 (three) times a day  , Disp: , Rfl:   •  sodium chloride, PF, 0 9 %, 10 mL by Intracatheter route daily (Patient not taking: Reported on 4/28/2023), Disp: 300 mL, Rfl: 3    Laboratory Results:        Invalid input(s): ALBUMIN    Results for orders placed or performed in visit on 04/27/23   Creatinine, urine, 24 hour   Result Value Ref Range    Creatinine, 24H Ur 0 1 (L) 0 6 - 1 8 g/24Hr    TOTAL URINE VOLUME 375 ml   Protein, urine, 24 hour   Result Value Ref Range    24H Urine Volume 375 mL    Protein, 24H Urine 615 (H) 40 - 150 mg/24 hrs     *Note: Due to a large number of results and/or encounters for the requested time period, some results have not been displayed  A complete set of results can be found in Results Review

## 2023-05-01 ENCOUNTER — OFFICE VISIT (OUTPATIENT)
Dept: PHYSICAL THERAPY | Facility: REHABILITATION | Age: 68
End: 2023-05-01

## 2023-05-01 DIAGNOSIS — R32 INCONTINENCE IN FEMALE: Primary | ICD-10-CM

## 2023-05-01 DIAGNOSIS — R39.15 URINARY URGENCY: ICD-10-CM

## 2023-05-01 NOTE — PROGRESS NOTES
Daily Note     Today's date: 2023  Patient name: Brian Mccallum  : 1955  MRN: [id number]  Referring provider: RAFITA Bonds  Dx:   Encounter Diagnosis     ICD-10-CM    1  Incontinence in female  R32       2  Urinary urgency  R39 15           Start Time: 1005  Stop Time: 1045  Total time in clinic (min): 40 minutes    Subjective: The patient notes that she felt she has not been able to eat much over the last week  She notes that she CAN eat but she is not eating a lot  She feels weak and fatigued  She does have a protein powder that tastes like chicken noodle soup and she is going to try to eat this today  She did follow up with nephrologist on Friday and she notes that there is still protein in her urine  She needs to have some further blood and urine tests performed  Objective: See treatment diary below      Assessment: Tolerated treatment well  Patient does have some improved overall pelvic floor muscle recruitment with her contractions  She also notes some improved awareness as well  She did have a tinge of blood in her Depends when she changed them and upon questioning she notes that she has had occasional blood in her urine (not new) or when she wipes  Her skin appears about the same as her last visit  No openings or wounds noted  She is going to reach out to the dietician she works with in regards to her appetite and also to the Nephrologist about blood in urine  She is having follow up blood work done  She will return for PT in 2 weeks  Recommended practicing PFM engagement once a day, 10x while lying down to continue to promote increased muscle recruitment and support  She did have minimal leakage of urine post treatment today after sitting up, whereas previously this was more significant loss of urine  Plan: Continue per plan of care        Precautions: history of endometrial cancer, chemotherapy and radiation; history of R LE blood clots; R ureteral stent  [employer] HEP    Manuals 2/6 2/13 2/27 3/9 3/16 3/23 4/25 5/1                                                         Neuro Re-Ed             Diaphragmatic Breathing             TA ADIM             TA + PFMC             Internal muscle cueing    10 min 15 min 15 min 10 min 10 min     Biofeedback sEMG   30 min 30 min total         PFMC: Slow Holds             PFMC: Quick Flicks   [id number]  supine 4x5 supine         PFMC + hip abd isometrics             PFMC + hip add isometrics             PFMC + ecc bridge             PFMC in sitting   10x           PFMC seated + tilt             PFMC in standing             PFMC in Quadruped                                                                 Ther Ex             PPT             Bridge + SLR             Bridge + marching                                                                              Ther Activity             EDUCATION  40 min incuding bladder log review and counseling 25 min 5 min 30 min 30 min 40 min 30 min     PFMC + reach             PFMC + squat             PFMC + lift             PFMC + sit to stand             PFMC + step up/down             Gait Training                                       Modalities

## 2023-05-04 ENCOUNTER — APPOINTMENT (EMERGENCY)
Dept: CT IMAGING | Facility: HOSPITAL | Age: 68
End: 2023-05-04

## 2023-05-04 ENCOUNTER — HOSPITAL ENCOUNTER (INPATIENT)
Facility: HOSPITAL | Age: 68
LOS: 9 days | Discharge: HOME HEALTH | End: 2023-05-13
Attending: EMERGENCY MEDICINE | Admitting: INTERNAL MEDICINE

## 2023-05-04 ENCOUNTER — APPOINTMENT (OUTPATIENT)
Dept: LAB | Facility: CLINIC | Age: 68
End: 2023-05-04

## 2023-05-04 ENCOUNTER — APPOINTMENT (EMERGENCY)
Dept: ULTRASOUND IMAGING | Facility: HOSPITAL | Age: 68
End: 2023-05-04

## 2023-05-04 DIAGNOSIS — R53.1 GENERALIZED WEAKNESS: ICD-10-CM

## 2023-05-04 DIAGNOSIS — N18.31 STAGE 3A CHRONIC KIDNEY DISEASE (HCC): ICD-10-CM

## 2023-05-04 DIAGNOSIS — R31.9 HEMATURIA: ICD-10-CM

## 2023-05-04 DIAGNOSIS — N30.91 HEMORRHAGIC CYSTITIS: ICD-10-CM

## 2023-05-04 DIAGNOSIS — C54.1 ENDOMETRIAL CANCER (HCC): ICD-10-CM

## 2023-05-04 DIAGNOSIS — N13.39 OTHER HYDRONEPHROSIS: ICD-10-CM

## 2023-05-04 DIAGNOSIS — N17.9 AKI (ACUTE KIDNEY INJURY) (HCC): Primary | ICD-10-CM

## 2023-05-04 DIAGNOSIS — N39.0 UTI (URINARY TRACT INFECTION): ICD-10-CM

## 2023-05-04 DIAGNOSIS — D50.8 OTHER IRON DEFICIENCY ANEMIA: ICD-10-CM

## 2023-05-04 DIAGNOSIS — Z45.2 ENCOUNTER FOR CENTRAL LINE CARE: ICD-10-CM

## 2023-05-04 DIAGNOSIS — R80.9 NEPHROTIC RANGE PROTEINURIA: ICD-10-CM

## 2023-05-04 DIAGNOSIS — D50.8 IRON DEFICIENCY ANEMIA SECONDARY TO INADEQUATE DIETARY IRON INTAKE: ICD-10-CM

## 2023-05-04 DIAGNOSIS — E86.0 DEHYDRATION: ICD-10-CM

## 2023-05-04 DIAGNOSIS — N17.9 ACUTE KIDNEY INJURY (HCC): Primary | ICD-10-CM

## 2023-05-04 DIAGNOSIS — R79.0 ABNORMAL IRON SATURATION: ICD-10-CM

## 2023-05-04 PROBLEM — E83.39 HYPERPHOSPHATEMIA: Status: ACTIVE | Noted: 2023-05-04

## 2023-05-04 PROBLEM — E87.20 METABOLIC ACIDOSIS: Status: ACTIVE | Noted: 2023-05-04

## 2023-05-04 LAB
ALBUMIN SERPL BCP-MCNC: 3.7 G/DL (ref 3.5–5)
ALP SERPL-CCNC: 118 U/L (ref 34–104)
ALT SERPL W P-5'-P-CCNC: 7 U/L (ref 7–52)
ANION GAP SERPL CALCULATED.3IONS-SCNC: 15 MMOL/L (ref 4–13)
ANION GAP SERPL CALCULATED.3IONS-SCNC: 17 MMOL/L (ref 4–13)
AST SERPL W P-5'-P-CCNC: 7 U/L (ref 13–39)
BACTERIA UR QL AUTO: ABNORMAL /HPF
BASE EX.OXY STD BLDV CALC-SCNC: 91.2 % (ref 60–80)
BASE EXCESS BLDV CALC-SCNC: -18.9 MMOL/L
BASOPHILS NFR BLD MANUAL: 1 % (ref 0–1)
BILIRUB SERPL-MCNC: 0.33 MG/DL (ref 0.2–1)
BILIRUB UR QL STRIP: NEGATIVE
BUN SERPL-MCNC: 87 MG/DL (ref 5–25)
BUN SERPL-MCNC: 89 MG/DL (ref 5–25)
BURR CELLS BLD QL SMEAR: PRESENT
CALCIUM SERPL-MCNC: 8.7 MG/DL (ref 8.4–10.2)
CALCIUM SERPL-MCNC: 9.2 MG/DL (ref 8.4–10.2)
CHLORIDE SERPL-SCNC: 103 MMOL/L (ref 96–108)
CHLORIDE SERPL-SCNC: 109 MMOL/L (ref 96–108)
CLARITY UR: ABNORMAL
CO2 SERPL-SCNC: 11 MMOL/L (ref 21–32)
CO2 SERPL-SCNC: 12 MMOL/L (ref 21–32)
COLOR UR: ABNORMAL
CREAT SERPL-MCNC: 4.1 MG/DL (ref 0.6–1.3)
CREAT SERPL-MCNC: 4.26 MG/DL (ref 0.6–1.3)
ERYTHROCYTE [DISTWIDTH] IN BLOOD BY AUTOMATED COUNT: 14.4 % (ref 11.6–15.1)
FERRITIN SERPL-MCNC: 1618 NG/ML (ref 8–388)
GFR SERPL CREATININE-BSD FRML MDRD: 10 ML/MIN/1.73SQ M
GFR SERPL CREATININE-BSD FRML MDRD: 10 ML/MIN/1.73SQ M
GLUCOSE SERPL-MCNC: 108 MG/DL (ref 65–140)
GLUCOSE SERPL-MCNC: 81 MG/DL (ref 65–140)
GLUCOSE UR STRIP-MCNC: NEGATIVE MG/DL
HCO3 BLDV-SCNC: 7.2 MMOL/L (ref 24–30)
HCT VFR BLD AUTO: 31.4 % (ref 34.8–46.1)
HGB BLD-MCNC: 9.4 G/DL (ref 11.5–15.4)
HGB UR QL STRIP.AUTO: ABNORMAL
IMM EOSINOPHIL NFR BLD MANUAL: 0 % (ref 0–6)
IRON SATN MFR SERPL: 39 % (ref 15–50)
IRON SERPL-MCNC: 74 UG/DL (ref 50–170)
KETONES UR STRIP-MCNC: NEGATIVE MG/DL
LDH SERPL-CCNC: 65 U/L (ref 140–271)
LEUKOCYTE ESTERASE UR QL STRIP: ABNORMAL
LYMPHOCYTES NFR BLD: 17 % (ref 14–44)
MCH RBC QN AUTO: 27.9 PG (ref 26.8–34.3)
MCHC RBC AUTO-ENTMCNC: 29.9 G/DL (ref 31.4–37.4)
MCV RBC AUTO: 93 FL (ref 82–98)
MONOCYTES NFR BLD AUTO: 2 % (ref 4–12)
NEUTS BAND NFR BLD MANUAL: 2 THOUSAND/UL
NEUTS SEG NFR BLD AUTO: 78 % (ref 45–77)
NITRITE UR QL STRIP: NEGATIVE
NON-SQ EPI CELLS URNS QL MICRO: ABNORMAL /HPF
NRBC BLD AUTO-RTO: 0 /100 WBCS
O2 CT BLDV-SCNC: 11 ML/DL
OVALOCYTES BLD QL SMEAR: PRESENT
PCO2 BLDV: 18.5 MM HG (ref 42–50)
PH BLDV: 7.21 [PH] (ref 7.3–7.4)
PH UR STRIP.AUTO: 5 [PH]
PHOSPHATE SERPL-MCNC: 8.4 MG/DL (ref 2.3–4.1)
PLATELET # BLD AUTO: 393 THOUSANDS/UL (ref 149–390)
PLATELET BLD QL SMEAR: ADEQUATE
PMV BLD AUTO: 9.8 FL (ref 8.9–12.7)
PO2 BLDV: 86.7 MM HG (ref 35–45)
POTASSIUM SERPL-SCNC: 4.6 MMOL/L (ref 3.5–5.3)
POTASSIUM SERPL-SCNC: 4.6 MMOL/L (ref 3.5–5.3)
PROT SERPL-MCNC: 8.9 G/DL (ref 6.4–8.4)
PROT UR STRIP-MCNC: ABNORMAL MG/DL
RBC # BLD AUTO: 3.37 MILLION/UL (ref 3.81–5.12)
RBC #/AREA URNS AUTO: ABNORMAL /HPF
RBC MORPH BLD: PRESENT
ROULEAUX BLD QL SMEAR: PRESENT
SODIUM SERPL-SCNC: 132 MMOL/L (ref 135–147)
SODIUM SERPL-SCNC: 135 MMOL/L (ref 135–147)
SP GR UR STRIP.AUTO: 1.01 (ref 1–1.03)
TIBC SERPL-MCNC: 188 UG/DL (ref 250–450)
TOTAL CELLS COUNTED SPEC: 100
UROBILINOGEN UR STRIP-ACNC: <2 MG/DL
WBC # BLD AUTO: 13.84 THOUSAND/UL (ref 4.31–10.16)
WBC #/AREA URNS AUTO: ABNORMAL /HPF
WBC CLUMPS # UR AUTO: PRESENT /UL

## 2023-05-04 RX ORDER — SODIUM CHLORIDE 9 MG/ML
100 INJECTION, SOLUTION INTRAVENOUS CONTINUOUS
Status: DISCONTINUED | OUTPATIENT
Start: 2023-05-04 | End: 2023-05-05

## 2023-05-04 RX ORDER — HEPARIN SODIUM 5000 [USP'U]/ML
5000 INJECTION, SOLUTION INTRAVENOUS; SUBCUTANEOUS EVERY 8 HOURS SCHEDULED
Status: DISCONTINUED | OUTPATIENT
Start: 2023-05-05 | End: 2023-05-07

## 2023-05-04 RX ADMIN — SODIUM CHLORIDE 1000 ML: 0.9 INJECTION, SOLUTION INTRAVENOUS at 19:22

## 2023-05-04 RX ADMIN — SODIUM BICARBONATE 100 ML/HR: 84 INJECTION, SOLUTION INTRAVENOUS at 21:07

## 2023-05-04 RX ADMIN — CEFEPIME 2000 MG: 2 INJECTION, POWDER, FOR SOLUTION INTRAVENOUS at 22:00

## 2023-05-04 NOTE — PROGRESS NOTES
Patient with history of endometrial cancer status post chemotherapy currently in remission  Also had right-sided hydronephrosis due to endometrial cancer and currently has nephroureteral stent  She also has nephrotic range proteinuria most likely due to previous Avastin use  She has not been feeling well lately with decreased appetite  Lab work reveals severe acute kidney injury complicated with metabolic acidosis  She needs further evaluation especially imaging to begin with (CT abdomen without contrast or kidney ultrasound to rule out hydronephrosis and malfunction of right-sided nephroureteral stent)  She will also need IV fluids      Lab Results   Component Value Date    SODIUM 132 (L) 05/04/2023    K 4 6 05/04/2023     05/04/2023    CO2 12 (L) 05/04/2023    BUN 89 (H) 05/04/2023    CREATININE 4 26 (H) 05/04/2023    GLUC 108 05/04/2023    CALCIUM 9 2 05/04/2023

## 2023-05-04 NOTE — ED PROVIDER NOTES
History  Chief Complaint   Patient presents with   • Abnormal Lab     Presents today after being told by her nephrologist to come in for evaluation of abnormal labs  Denies pain or urinary symptoms  44-year-old female coming into the ED for evaluation of abnormal labs  She was seen by her nephrologist who did outpatient blood work and found her to have acute kidney injury with a creatinine over 4  She states she has had poor appetite, poor oral intake recently  She states she is urinating normally  She denies abdominal or flank pain  Has no prior history of kidney failure or chronic kidney disease  She does not take daily NSAIDs  History provided by:  Patient   used: No        Prior to Admission Medications   Prescriptions Last Dose Informant Patient Reported? Taking? ARIPiprazole (ABILIFY) 20 MG tablet   Yes No   Sig: Take 20 mg by mouth in the morning  Calcium-Magnesium-Vitamin D (CALCIUM 500 PO)   Yes No   Sig: Take by mouth daily    Cranberry-Vitamin C 250-60 MG CAPS   No No   Sig: Take 1 tablet by mouth in the morning   Cyanocobalamin (VITAMIN B12 PO)   Yes No   Sig: Take by mouth daily    Gemtesa 75 MG TABS   No No   Sig: TAKE 75 MG BY MOUTH IN THE MORNING   Multiple Vitamin (MULTIVITAMIN) tablet   Yes No   Sig: Take 1 tablet by mouth daily   aspirin (ECOTRIN LOW STRENGTH) 81 mg EC tablet   Yes No   Sig: Take 81 mg by mouth every evening   cholecalciferol (VITAMIN D3) 1,000 units tablet   No No   Sig: Take 4 tablets (4,000 Units total) by mouth daily   dronabinol (MARINOL) 2 5 mg capsule   No No   Sig: Take 1 capsule (2 5 mg total) by mouth 2 (two) times a day before meals   enoxaparin (LOVENOX) 80 mg/0 8 mL   No No   Sig: INJECT 0 8ML (80MG TOTAL)  SUBCUTANEOUSLY EVERY 24  HOURS   folic acid (FOLVITE) 1 mg tablet   No No   Sig: Take 1 tablet (1 mg total) by mouth daily   naloxone (NARCAN) 4 mg/0 1 mL nasal spray   No No   Sig: Administer 1 spray into a nostril   If no response after 2-3 minutes, give another dose in the other nostril using a new spray     omeprazole (PriLOSEC) 20 mg delayed release capsule   Yes No   Sig: Take 20 mg by mouth daily   oxybutynin (DITROPAN XL) 15 MG 24 hr tablet   No No   Sig: Take 1 tablet (15 mg total) by mouth daily at bedtime   saccharomyces boulardii (FLORASTOR) 250 mg capsule   No No   Sig: Take 1 capsule (250 mg total) by mouth 2 (two) times a day   senna (SENOKOT) 8 6 MG tablet   No No   Sig: Take 1 tablet (8 6 mg total) by mouth 2 (two) times a day as needed for constipation   sodium chloride, PF, 0 9 %   No No   Sig: 10 mL by Intracatheter route daily   Patient not taking: Reported on 4/28/2023   venlafaxine (EFFEXOR) 75 mg tablet   Yes No   Sig: Take 75 mg by mouth 3 (three) times a day        Facility-Administered Medications: None       Past Medical History:   Diagnosis Date   • Anxiety    • CKD (chronic kidney disease) stage 3, GFR 30-59 ml/min ([facility] [address] )    • COVID     Fall 2022   • Depression    • Endometrial cancer ([facility] [address] ) 12/2017   • GERD (gastroesophageal reflux disease)    • H/O gastric bypass    • Hard to intubate     pt denies   • History of chemotherapy     started 12/2021endometrial cancer- done 12/23/22   • History of DVT in adulthood     RLE   • Hyperglycemia     vx type 2 dm -- last assessed 4/1/14; resolved 11/7/17   • Hyperlipidemia    • Hypertension     in past- no meds at present   • Iron deficiency anemia     iron infusions weekly   • OAB (overactive bladder)    • Port-A-Cath in place    • Wears glasses        Past Surgical History:   Procedure Laterality Date   • ABDOMINAL SURGERY      GASTRIC BYPASS; 2001   • BREAST BIOPSY Right 06/28/2019    core biopsy; benign   • CHOLECYSTECTOMY      at the time of gastric bypass   • COLONOSCOPY     • CT NEEDLE BIOPSY LYMPH NODE  07/08/2019   • FL GUIDED CENTRAL VENOUS ACCESS DEVICE INSERTION  11/12/2019   • FL RETROGRADE PYELOGRAM  3/30/2023   • GASTRIC BYPASS     • HYSTERECTOMY Bilateral 2017    total abdominal with salpingo-oophorectomy   • IR NEPHROSTOMY TO NEPHROURETERAL STENT  06/09/2022   • IR NEPHROSTOMY TO NEPHROURETERAL STENT  12/20/2022   • IR NEPHROSTOMY TUBE CHECK/CHANGE/REPOSITION/REINSERTION/UPSIZE  05/27/2022   • IR NEPHROSTOMY TUBE PLACEMENT  07/26/2019   • IR NEPHROURETERAL STENT CHECK/CHANGE/REPOSITION  04/06/2021   • IR NEPHROURETERAL STENT CHECK/CHANGE/REPOSITION  06/04/2021   • IR NEPHROURETERAL STENT CHECK/CHANGE/REPOSITION  09/03/2021   • IR NEPHROURETERAL STENT CHECK/CHANGE/REPOSITION  12/07/2021   • IR NEPHROURETERAL STENT CHECK/CHANGE/REPOSITION  03/08/2022   • IR NEPHROURETERAL STENT CHECK/CHANGE/REPOSITION  05/10/2022   • IR NEPHROURETERAL STENT CHECK/CHANGE/REPOSITION  09/19/2022   • IR PICC LINE  09/27/2019   • IR PORT PLACEMENT  07/26/2019   • IR PORT REMOVAL  09/20/2019   • OOPHORECTOMY Bilateral 2017   • ND CYSTO BLADDER W/URETERAL CATHETERIZATION Right 3/30/2023    Procedure: CYSTOSCOPY RETROGRADE PYELOGRAM WITH exchange of STENT URETERAL;  Surgeon: Brenda Reyes MD;  [facility];  Service: Urology   • ND CYSTO W/INSERT URETERAL STENT Right 3/30/2023    Procedure: EXCHANGE STENT URETERAL;  Surgeon: Brenda Reyes MD;  [facility];  Service: Urology   • ND INSJ TUNNELED CTR VAD W/SUBQ PORT AGE 5 YR/> Left 11/12/2019    Procedure: INSERTION VENOUS PORT ( PORT-A-CATH) IR;  Surgeon: Kimberly Goodpasture, DO;  [facility];  Service: Interventional Radiology   • ND LAPS ABD PRTM&OMENTUM DX W/WO SPEC BR/WA SPX N/A 12/19/2017    Procedure: LAPAROSCOPY DIAGNOSTIC;  Surgeon: Jaiden Call MD;  [facility];  Service: Gynecology Oncology   • ND LAPS W/RAD HYST W/BILAT LMPHADEC RMVL TUBE/OVARY N/A 12/19/2017    Procedure: HYSTERECTOMY LAPAROSCOPIC TOTAL ([address]) W/ ROBOTICS; BILATERAL SALPINGOOPHERECTOMY; LYMPH NODE DISSECTION; lysis of adhesions;  Surgeon: Jaiden Call MD;  [facility];  Service: Gynecology Oncology   • TONSILLECTOMY     • US GUIDED BREAST BIOPSY RIGHT COMPLETE Right 06/28/2019       Family History   Problem Relation Age of Onset   • Hyperlipidemia Mother    • Heart disease Mother    • Ovarian cancer Mother 48   • Colon cancer Mother    • Lymphoma Father    • Breast cancer Sister 61   • No Known Problems Brother    • No Known Problems Brother    • No Known Problems Maternal Grandmother    • Bone cancer Maternal Grandfather    • Uterine cancer Paternal Grandmother    • No Known Problems Paternal Grandfather    • No Known Problems Maternal Aunt    • No Known Problems Maternal Aunt    • No Known Problems Maternal Aunt    • No Known Problems Maternal Aunt    • No Known Problems Paternal Aunt    • No Known Problems Paternal Aunt    • No Known Problems Paternal Aunt    • No Known Problems Paternal Aunt      I have reviewed and agree with the history as documented  E-Cigarette/Vaping   • E-Cigarette Use Never User      E-Cigarette/Vaping Substances   • Nicotine No    • THC No    • CBD No    • Flavoring No    • Other No    • Unknown No      Social History     Tobacco Use   • Smoking status: Never   • Smokeless tobacco: Never   Vaping Use   • Vaping Use: Never used   Substance Use Topics   • Alcohol use: Not Currently   • Drug use: No       Review of Systems   Constitutional: Positive for fatigue  Genitourinary: Positive for dysuria and hematuria  All other systems reviewed and are negative  Physical Exam  Physical Exam  Vitals and nursing note reviewed  Constitutional:       General: She is not in acute distress  Appearance: Normal appearance  She is well-developed and normal weight  She is not ill-appearing, toxic-appearing or diaphoretic  HENT:      Head: Normocephalic and atraumatic  Right Ear: External ear normal       Left Ear: External ear normal       Nose: Nose normal       Mouth/Throat:      Mouth: Mucous membranes are dry  Pharynx: Oropharynx is clear     Eyes: Extraocular Movements: Extraocular movements intact  Conjunctiva/sclera: Conjunctivae normal       Pupils: Pupils are equal, round, and reactive to light  Cardiovascular:      Rate and Rhythm: Normal rate and regular rhythm  Pulses: Normal pulses  Heart sounds: Normal heart sounds  Pulmonary:      Effort: Pulmonary effort is normal       Breath sounds: Normal breath sounds  Abdominal:      General: Abdomen is flat  Bowel sounds are normal  There is no distension or abdominal bruit  There are no signs of injury  Palpations: Abdomen is soft  There is no shifting dullness or mass  Tenderness: There is abdominal tenderness in the right lower quadrant  There is no guarding or rebound  Hernia: No hernia is present  Genitourinary:     Adnexa: Right adnexa normal and left adnexa normal    Musculoskeletal:         General: Normal range of motion  Cervical back: Normal range of motion and neck supple  Skin:     General: Skin is warm and dry  Capillary Refill: Capillary refill takes less than 2 seconds  Neurological:      General: No focal deficit present  Mental Status: She is alert and oriented to person, place, and time  Mental status is at baseline     Psychiatric:         Mood and Affect: Mood normal          Behavior: Behavior normal          Vital Signs  ED Triage Vitals [05/04/23 1727]   Temperature Pulse Respirations Blood Pressure SpO2   97 7 °F (36 5 °C) 88 16 99/60 99 %      Temp Source Heart Rate Source Patient Position - Orthostatic VS BP Location FiO2 (%)   Oral Monitor Lying Right arm --      Pain Score       --           Vitals:    05/04/23 1727 05/04/23 1945   BP: 99/60 114/64   Pulse: 88 71   Patient Position - Orthostatic VS: Lying Lying         Visual Acuity      ED Medications  Medications   sodium bicarbonate 150 mEq in dextrose 5 % 1,000 mL infusion (100 mL/hr Intravenous New Bag 5/4/23 2107)   sodium chloride 0 9 % infusion (has no administration in time range)   sodium chloride 0 9 % bolus 1,000 mL (0 mL Intravenous Stopped 5/4/23 2152)   cefepime (MAXIPIME) 2 g/50 mL dextrose IVPB (0 mg Intravenous Stopped 5/4/23 2230)       Diagnostic Studies  Results Reviewed     Procedure Component Value Units Date/Time    Blood gas, venous [[id number]]     Lab Status: No result Specimen: Blood     Basic metabolic panel [[id number]]  (Abnormal) Collected: 05/04/23 2157    Lab Status: Final result Specimen: Blood from Hand, Left Updated: 05/04/23 2231     Sodium 135 mmol/L      Potassium 4 6 mmol/L      Chloride 109 mmol/L      CO2 11 mmol/L      ANION GAP 15 mmol/L      BUN 87 mg/dL      Creatinine 4 10 mg/dL      Glucose 81 mg/dL      Calcium 8 7 mg/dL      eGFR 10 ml/min/1 73sq m     Narrative:      [place] guidelines for Chronic Kidney Disease (CKD):   •  Stage 1 with normal or high GFR (GFR > 90 mL/min/1 73 square meters)  •  Stage 2 Mild CKD (GFR = 60-89 mL/min/1 73 square meters)  •  Stage 3A Moderate CKD (GFR = 45-59 mL/min/1 73 square meters)  •  Stage 3B Moderate CKD (GFR = 30-44 mL/min/1 73 square meters)  •  Stage 4 Severe CKD (GFR = 15-29 mL/min/1 73 square meters)  •  Stage 5 End Stage CKD (GFR <15 mL/min/1 73 square meters)  Note: GFR calculation is accurate only with a steady state creatinine    Urine Microscopic [[id number]]  (Abnormal) Collected: 05/04/23 2127    Lab Status: Final result Specimen: Urine, Indwelling Sifuentes Catheter Updated: 05/04/23 2222     RBC, UA Innumerable /hpf      WBC, UA Innumerable /hpf      Epithelial Cells None Seen /hpf      Bacteria, UA Occasional /hpf      WBC Clumps Present    Urine culture [[id number]] Collected: 05/04/23 2127    Lab Status:  In process Specimen: Urine, Indwelling Sifuentes Catheter Updated: 05/04/23 2222    UA w Reflex to Microscopic w Reflex to Culture [[id number]]  (Abnormal) Collected: 05/04/23 2127    Lab Status: Final result Specimen: Urine, Indwelling Sifuentes Catheter Updated: 05/04/23 2211     Color, UA Light Orange     Clarity, UA Extra Turbid     Specific Gravity, UA 1 006     pH, UA 5 0     Leukocytes, UA Large     Nitrite, UA Negative     Protein, UA 30 (1+) mg/dl      Glucose, UA Negative mg/dl      Ketones, UA Negative mg/dl      Urobilinogen, UA <2 0 mg/dl      Bilirubin, UA Negative     Occult Blood, UA Large    Blood culture #2 [[id number]] Collected: 05/04/23 2156    Lab Status: In process Specimen: Blood from Hand, Left Updated: 05/04/23 2203    Blood culture #1 [[id number]] Collected: 05/04/23 2157    Lab Status: In process Specimen: Blood from Arm, Left Updated: 05/04/23 2203    Blood gas, venous [[id number]]  (Abnormal) Collected: 05/04/23 1941    Lab Status: Final result Specimen: Blood from Line, Venous Updated: 05/04/23 1953     pH, Jonathan 7 207     pCO2, Jonathan 18 5 mm Hg      pO2, Jonathan 86 7 mm Hg      HCO3, Jonathan 7 2 mmol/L      Base Excess, Jonathan -18 9 mmol/L      O2 Content, Jonathan 11 0 ml/dL      O2 HGB, VENOUS 91 2 %                  CT abdomen pelvis wo contrast   Final Result by Jaylen Lowery MD (05/04 2143)         1  Right ureteral stent in expected position  Resolution of subcapsular collection  No obstructive uropathy  2  Mild left hydronephrosis and hydroureter  No obstructing calculus  Pyelonephritis not excluded  3  Persistent circumferential bladder wall thickening could indicate cystitis  4  Severe constipation  Workstation performed: [id number]         US kidney and bladder with pvr   Final Result by Gray Farfan MD (05/04 1952)      The nephroureteral stent is in place  Mild bilateral hydronephrosis more prominent on the left  Chronic circumferential bladder wall thickening              Workstation performed: [id number]                    Procedures  CriticalCare Time    Date/Time: 5/4/2023 11:35 PM  Performed by: Shireen Hogan DO  Authorized by: Shireen Hogan DO     Critical care provider statement:     Critical care time (minutes):  40    Critical care time was exclusive of:  Separately billable procedures and treating other patients and teaching time    Critical care was necessary to treat or prevent imminent or life-threatening deterioration of the following conditions:  Renal failure, dehydration and metabolic crisis    Critical care was time spent personally by me on the following activities:  Obtaining history from patient or surrogate, development of treatment plan with patient or surrogate, discussions with consultants, discussions with primary provider, evaluation of patient's response to treatment, examination of patient, review of old charts, ordering and review of radiographic studies, re-evaluation of patient's condition, ordering and review of laboratory studies and ordering and performing treatments and interventions    I assumed direction of critical care for this patient from another provider in my specialty: no               ED Course  ED Course as of 05/04/23 2337   Thu May 04, 2023   2018 Discussed w/ nephrology - recommends bicarb drip, 150 meq in D5W for severe metabolic acidosis  Bicarb 7, PH 7 2   + urinary retention, 240mL in bladder  Will place manley cath  2122 Manley placed -grossly purulent, and bloody w/ clots  Hand irrigating - no further clots, but urine clearing, dark red colored  Will leave catheter to gravity at this time, if bleeding worsens may place 3 way cath and CBI    2212 D/w Dr Khoa Quezada, accepts to service of Dr Kaylah Barker  SBIRT 20yo+    Flowsheet Row Most Recent Value   Initial Alcohol Screen: US AUDIT-C     1  How often do you have a drink containing alcohol? 0 Filed at: 05/04/2023 1846   2  How many drinks containing alcohol do you have on a typical day you are drinking? 0 Filed at: 05/04/2023 1846   3a  Male UNDER 65: How often do you have five or more drinks on one occasion? 0 Filed at: 05/04/2023 1846   3b  FEMALE Any Age, or MALE 65+:  How often do you have 4 or more drinks on one occassion? 0 Filed at: 05/04/2023 1846   Audit-C Score 0 Filed at: 05/04/2023 1846   VALERIE: How many times in the past year have you    Used an illegal drug or used a prescription medication for non-medical reasons? Never Filed at: 05/04/2023 1846                    Medical Decision Making  44-year-old female coming into the ED for evaluation of acute kidney injury by outpatient labs today  Creatinine over 4 which is new, last creatinine was 1 6  Additionally her BUN is up in the 80s, her ratio is about 20-1  Patient was found to be retaining urine so a Sifuentes catheter was placed which revealed purulent urine, additionally grossly bloody urine with clots  Urine was hand irrigated and did initially clear but then clots started to come out again  Decided to place a three-way Sifuentes catheter and start CBI  IV fluids given for dehydration  Antibiotics given for acute UTI, possibly drug resistant/polymicrobial per last urine culture, containing S  Aureus and enterococcus  Case discussed with hospitalist who accepted service  Acute kidney injury [facility]): acute illness or injury  Dehydration: acute illness or injury  Hematuria: acute illness or injury  UTI (urinary tract infection): acute illness or injury  Amount and/or Complexity of Data Reviewed  Labs: ordered  Radiology: ordered  Risk  Decision regarding hospitalization            Disposition  Final diagnoses:   Acute kidney injury ([address] )   Dehydration   UTI (urinary tract infection)   Hematuria     Time reflects when diagnosis was documented in both MDM as applicable and the Disposition within this note     Time User Action Codes Description Comment    5/4/2023 10:20 PM Rafael Junior [N17 9] Acute kidney injury ([address] )     5/4/2023 10:20 PM Rafael Junior [E86 0] Dehydration     5/4/2023 10:21 PM Lizet Phipps [N39 0] UTI (urinary tract infection)     5/4/2023 10:21 PM Adolfo Arnold, [address] [R31 [id number]] Hematuria       ED Disposition     ED Disposition   Admit    Condition   Stable    Date/Time   Thu May 4, 2023 10:20 PM    Comment   Case was discussed with Dr Cleta Lundborg and the patient's admission status was agreed to be Admission Status: inpatient status to the service of Dr Lillian Lacey   Follow-up Information    None         Patient's Medications   Discharge Prescriptions    No medications on file       No discharge procedures on file      PDMP Review       Value Time User    PDMP Reviewed  Yes 8/26/2022  4:42 PM Edd Griffith PA-C          ED Provider  Electronically Signed by           France Kwon DO  05/04/23 3883

## 2023-05-05 ENCOUNTER — APPOINTMENT (INPATIENT)
Dept: VASCULAR ULTRASOUND | Facility: HOSPITAL | Age: 68
End: 2023-05-05

## 2023-05-05 PROBLEM — Z86.718 HISTORY OF DVT (DEEP VEIN THROMBOSIS): Status: ACTIVE | Noted: 2023-05-05

## 2023-05-05 PROBLEM — K59.00 CONSTIPATION: Status: ACTIVE | Noted: 2021-01-08

## 2023-05-05 PROBLEM — E43 SEVERE PROTEIN-CALORIE MALNUTRITION (HCC): Status: ACTIVE | Noted: 2019-10-03

## 2023-05-05 PROBLEM — E87.29 METABOLIC ACIDOSIS, INCREASED ANION GAP: Status: ACTIVE | Noted: 2023-05-04

## 2023-05-05 LAB
ALBUMIN SERPL BCP-MCNC: 3 G/DL (ref 3.5–5)
ALP SERPL-CCNC: 94 U/L (ref 34–104)
ALT SERPL W P-5'-P-CCNC: 5 U/L (ref 7–52)
ANION GAP SERPL CALCULATED.3IONS-SCNC: 12 MMOL/L (ref 4–13)
ANION GAP SERPL CALCULATED.3IONS-SCNC: 14 MMOL/L (ref 4–13)
APTT PPP: 36 SECONDS (ref 23–37)
AST SERPL W P-5'-P-CCNC: 6 U/L (ref 13–39)
BASE EX.OXY STD BLDV CALC-SCNC: 62.9 % (ref 60–80)
BASE EX.OXY STD BLDV CALC-SCNC: 72.9 % (ref 60–80)
BASE EXCESS BLDV CALC-SCNC: -4.1 MMOL/L
BASE EXCESS BLDV CALC-SCNC: 5.3 MMOL/L
BASOPHILS # BLD AUTO: 0.05 THOUSANDS/ÂΜL (ref 0–0.1)
BASOPHILS NFR BLD AUTO: 0 % (ref 0–1)
BILIRUB SERPL-MCNC: 0.32 MG/DL (ref 0.2–1)
BUN SERPL-MCNC: 71 MG/DL (ref 5–25)
BUN SERPL-MCNC: 78 MG/DL (ref 5–25)
CALCIUM ALBUM COR SERPL-MCNC: 9 MG/DL (ref 8.3–10.1)
CALCIUM SERPL-MCNC: 8.2 MG/DL (ref 8.4–10.2)
CALCIUM SERPL-MCNC: 8.3 MG/DL (ref 8.4–10.2)
CHLORIDE SERPL-SCNC: 102 MMOL/L (ref 96–108)
CHLORIDE SERPL-SCNC: 104 MMOL/L (ref 96–108)
CO2 SERPL-SCNC: 19 MMOL/L (ref 21–32)
CO2 SERPL-SCNC: 22 MMOL/L (ref 21–32)
CREAT SERPL-MCNC: 3.48 MG/DL (ref 0.6–1.3)
CREAT SERPL-MCNC: 3.6 MG/DL (ref 0.6–1.3)
EOSINOPHIL # BLD AUTO: 0.02 THOUSAND/ÂΜL (ref 0–0.61)
EOSINOPHIL NFR BLD AUTO: 0 % (ref 0–6)
ERYTHROCYTE [DISTWIDTH] IN BLOOD BY AUTOMATED COUNT: 14.2 % (ref 11.6–15.1)
GFR SERPL CREATININE-BSD FRML MDRD: 12 ML/MIN/1.73SQ M
GFR SERPL CREATININE-BSD FRML MDRD: 13 ML/MIN/1.73SQ M
GLUCOSE SERPL-MCNC: 155 MG/DL (ref 65–140)
GLUCOSE SERPL-MCNC: 291 MG/DL (ref 65–140)
HAPTOGLOB SERPL-MCNC: 294 MG/DL (ref 37–355)
HCO3 BLDV-SCNC: 21.7 MMOL/L (ref 24–30)
HCO3 BLDV-SCNC: 32.7 MMOL/L (ref 24–30)
HCT VFR BLD AUTO: 26.1 % (ref 34.8–46.1)
HGB BLD-MCNC: 8 G/DL (ref 11.5–15.4)
IMM GRANULOCYTES # BLD AUTO: 0.19 THOUSAND/UL (ref 0–0.2)
IMM GRANULOCYTES NFR BLD AUTO: 2 % (ref 0–2)
KAPPA LC FREE SER-MCNC: 248.5 MG/L (ref 3.3–19.4)
KAPPA LC FREE/LAMBDA FREE SER: 1.81 {RATIO} (ref 0.26–1.65)
LAMBDA LC FREE SERPL-MCNC: 137.3 MG/L (ref 5.7–26.3)
LYMPHOCYTES # BLD AUTO: 0.61 THOUSANDS/ÂΜL (ref 0.6–4.47)
LYMPHOCYTES NFR BLD AUTO: 5 % (ref 14–44)
MAGNESIUM SERPL-MCNC: 1.5 MG/DL (ref 1.9–2.7)
MAGNESIUM SERPL-MCNC: 2.4 MG/DL (ref 1.9–2.7)
MCH RBC QN AUTO: 27.7 PG (ref 26.8–34.3)
MCHC RBC AUTO-ENTMCNC: 30.7 G/DL (ref 31.4–37.4)
MCV RBC AUTO: 90 FL (ref 82–98)
MONOCYTES # BLD AUTO: 1.06 THOUSAND/ÂΜL (ref 0.17–1.22)
MONOCYTES NFR BLD AUTO: 8 % (ref 4–12)
NEUTROPHILS # BLD AUTO: 11.09 THOUSANDS/ÂΜL (ref 1.85–7.62)
NEUTS SEG NFR BLD AUTO: 85 % (ref 43–75)
NRBC BLD AUTO-RTO: 0 /100 WBCS
O2 CT BLDV-SCNC: 7.7 ML/DL
O2 CT BLDV-SCNC: 8.8 ML/DL
PCO2 BLDV: 42.8 MM HG (ref 42–50)
PCO2 BLDV: 66.5 MM HG (ref 42–50)
PH BLDV: 7.31 [PH] (ref 7.3–7.4)
PH BLDV: 7.32 [PH] (ref 7.3–7.4)
PHOSPHATE SERPL-MCNC: 5.2 MG/DL (ref 2.3–4.1)
PHOSPHATE SERPL-MCNC: 6.8 MG/DL (ref 2.3–4.1)
PLATELET # BLD AUTO: 292 THOUSANDS/UL (ref 149–390)
PLATELET # BLD AUTO: 328 THOUSANDS/UL (ref 149–390)
PMV BLD AUTO: 10 FL (ref 8.9–12.7)
PMV BLD AUTO: 9.7 FL (ref 8.9–12.7)
PO2 BLDV: 35.6 MM HG (ref 35–45)
PO2 BLDV: 44.4 MM HG (ref 35–45)
POTASSIUM SERPL-SCNC: 3.2 MMOL/L (ref 3.5–5.3)
POTASSIUM SERPL-SCNC: 4 MMOL/L (ref 3.5–5.3)
PROT SERPL-MCNC: 7.3 G/DL (ref 6.4–8.4)
RBC # BLD AUTO: 2.89 MILLION/UL (ref 3.81–5.12)
RHEUMATOID FACT SER QL LA: NEGATIVE
SODIUM SERPL-SCNC: 136 MMOL/L (ref 135–147)
SODIUM SERPL-SCNC: 137 MMOL/L (ref 135–147)
WBC # BLD AUTO: 13.02 THOUSAND/UL (ref 4.31–10.16)

## 2023-05-05 RX ORDER — MAGNESIUM SULFATE HEPTAHYDRATE 40 MG/ML
2 INJECTION, SOLUTION INTRAVENOUS ONCE
Status: COMPLETED | OUTPATIENT
Start: 2023-05-05 | End: 2023-05-05

## 2023-05-05 RX ORDER — SODIUM CHLORIDE 9 MG/ML
100 INJECTION, SOLUTION INTRAVENOUS CONTINUOUS
Status: DISCONTINUED | OUTPATIENT
Start: 2023-05-05 | End: 2023-05-06

## 2023-05-05 RX ORDER — BISACODYL 10 MG
10 SUPPOSITORY, RECTAL RECTAL ONCE AS NEEDED
Status: COMPLETED | OUTPATIENT
Start: 2023-05-05 | End: 2023-05-12

## 2023-05-05 RX ORDER — ASPIRIN 81 MG/1
81 TABLET ORAL EVERY EVENING
Status: DISCONTINUED | OUTPATIENT
Start: 2023-05-05 | End: 2023-05-07

## 2023-05-05 RX ORDER — BISACODYL 10 MG
10 SUPPOSITORY, RECTAL RECTAL ONCE
Status: COMPLETED | OUTPATIENT
Start: 2023-05-05 | End: 2023-05-05

## 2023-05-05 RX ORDER — CALCIUM ACETATE 667 MG/1
667 CAPSULE ORAL
Status: DISCONTINUED | OUTPATIENT
Start: 2023-05-05 | End: 2023-05-05

## 2023-05-05 RX ORDER — BISACODYL 5 MG/1
10 TABLET, DELAYED RELEASE ORAL DAILY PRN
Status: DISCONTINUED | OUTPATIENT
Start: 2023-05-05 | End: 2023-05-05

## 2023-05-05 RX ORDER — VENLAFAXINE 37.5 MG/1
75 TABLET ORAL 3 TIMES DAILY
Status: DISCONTINUED | OUTPATIENT
Start: 2023-05-05 | End: 2023-05-13 | Stop reason: HOSPADM

## 2023-05-05 RX ORDER — POTASSIUM CHLORIDE 20 MEQ/1
40 TABLET, EXTENDED RELEASE ORAL ONCE
Status: COMPLETED | OUTPATIENT
Start: 2023-05-05 | End: 2023-05-05

## 2023-05-05 RX ORDER — POLYETHYLENE GLYCOL 3350 17 G/17G
17 POWDER, FOR SOLUTION ORAL DAILY
Status: DISCONTINUED | OUTPATIENT
Start: 2023-05-05 | End: 2023-05-13 | Stop reason: HOSPADM

## 2023-05-05 RX ORDER — ARIPIPRAZOLE 5 MG/1
20 TABLET ORAL DAILY
Status: DISCONTINUED | OUTPATIENT
Start: 2023-05-05 | End: 2023-05-13 | Stop reason: HOSPADM

## 2023-05-05 RX ORDER — OXYCODONE HYDROCHLORIDE 5 MG/1
5 TABLET ORAL EVERY 4 HOURS PRN
Status: DISCONTINUED | OUTPATIENT
Start: 2023-05-05 | End: 2023-05-13 | Stop reason: HOSPADM

## 2023-05-05 RX ORDER — ACETAMINOPHEN 325 MG/1
650 TABLET ORAL EVERY 4 HOURS PRN
Status: DISCONTINUED | OUTPATIENT
Start: 2023-05-05 | End: 2023-05-13 | Stop reason: HOSPADM

## 2023-05-05 RX ORDER — OXYBUTYNIN CHLORIDE 5 MG/1
15 TABLET, EXTENDED RELEASE ORAL
Status: DISCONTINUED | OUTPATIENT
Start: 2023-05-05 | End: 2023-05-13 | Stop reason: HOSPADM

## 2023-05-05 RX ORDER — SACCHAROMYCES BOULARDII 250 MG
250 CAPSULE ORAL 2 TIMES DAILY
Status: DISCONTINUED | OUTPATIENT
Start: 2023-05-05 | End: 2023-05-13 | Stop reason: HOSPADM

## 2023-05-05 RX ORDER — CALCIUM ACETATE 667 MG/1
667 CAPSULE ORAL
Status: DISCONTINUED | OUTPATIENT
Start: 2023-05-06 | End: 2023-05-05

## 2023-05-05 RX ORDER — PANTOPRAZOLE SODIUM 40 MG/1
40 TABLET, DELAYED RELEASE ORAL
Status: DISCONTINUED | OUTPATIENT
Start: 2023-05-05 | End: 2023-05-13 | Stop reason: HOSPADM

## 2023-05-05 RX ORDER — CALCIUM CARBONATE 500(1250)
1 TABLET ORAL
Status: DISCONTINUED | OUTPATIENT
Start: 2023-05-05 | End: 2023-05-05

## 2023-05-05 RX ORDER — FOLIC ACID 1 MG/1
1 TABLET ORAL DAILY
Status: DISCONTINUED | OUTPATIENT
Start: 2023-05-05 | End: 2023-05-13 | Stop reason: HOSPADM

## 2023-05-05 RX ORDER — DOCUSATE SODIUM 100 MG/1
100 CAPSULE, LIQUID FILLED ORAL 2 TIMES DAILY
Status: DISCONTINUED | OUTPATIENT
Start: 2023-05-05 | End: 2023-05-13 | Stop reason: HOSPADM

## 2023-05-05 RX ADMIN — SODIUM CHLORIDE 100 ML/HR: 0.9 INJECTION, SOLUTION INTRAVENOUS at 19:11

## 2023-05-05 RX ADMIN — SODIUM BICARBONATE 100 ML/HR: 84 INJECTION, SOLUTION INTRAVENOUS at 09:47

## 2023-05-05 RX ADMIN — Medication 250 MG: at 17:14

## 2023-05-05 RX ADMIN — HEPARIN SODIUM 5000 UNITS: 5000 INJECTION INTRAVENOUS; SUBCUTANEOUS at 15:37

## 2023-05-05 RX ADMIN — VENLAFAXINE 75 MG: 37.5 TABLET ORAL at 22:23

## 2023-05-05 RX ADMIN — ASPIRIN 81 MG: 81 TABLET, COATED ORAL at 17:14

## 2023-05-05 RX ADMIN — HEPARIN SODIUM 5000 UNITS: 5000 INJECTION INTRAVENOUS; SUBCUTANEOUS at 22:24

## 2023-05-05 RX ADMIN — CEFEPIME 1000 MG: 1 INJECTION, POWDER, FOR SOLUTION INTRAMUSCULAR; INTRAVENOUS at 22:24

## 2023-05-05 RX ADMIN — POLYETHYLENE GLYCOL 3350 17 G: 17 POWDER, FOR SOLUTION ORAL at 08:01

## 2023-05-05 RX ADMIN — POTASSIUM CHLORIDE 40 MEQ: 1500 TABLET, EXTENDED RELEASE ORAL at 09:53

## 2023-05-05 RX ADMIN — PANTOPRAZOLE SODIUM 40 MG: 40 TABLET, DELAYED RELEASE ORAL at 05:57

## 2023-05-05 RX ADMIN — HEPARIN SODIUM 5000 UNITS: 5000 INJECTION INTRAVENOUS; SUBCUTANEOUS at 05:57

## 2023-05-05 RX ADMIN — FOLIC ACID 1 MG: 1 TABLET ORAL at 08:03

## 2023-05-05 RX ADMIN — VENLAFAXINE 75 MG: 37.5 TABLET ORAL at 08:05

## 2023-05-05 RX ADMIN — ARIPIPRAZOLE 20 MG: 5 TABLET ORAL at 08:01

## 2023-05-05 RX ADMIN — VENLAFAXINE 75 MG: 37.5 TABLET ORAL at 17:14

## 2023-05-05 RX ADMIN — DOCUSATE SODIUM 100 MG: 100 CAPSULE, LIQUID FILLED ORAL at 17:14

## 2023-05-05 RX ADMIN — MAGNESIUM SULFATE HEPTAHYDRATE 2 G: 40 INJECTION, SOLUTION INTRAVENOUS at 09:48

## 2023-05-05 RX ADMIN — OXYBUTYNIN CHLORIDE 15 MG: 5 TABLET, EXTENDED RELEASE ORAL at 22:23

## 2023-05-05 RX ADMIN — BISACODYL 10 MG: 10 SUPPOSITORY RECTAL at 05:57

## 2023-05-05 RX ADMIN — OXYBUTYNIN CHLORIDE 15 MG: 5 TABLET, EXTENDED RELEASE ORAL at 00:43

## 2023-05-05 RX ADMIN — CALCIUM ACETATE 667 MG: 667 CAPSULE ORAL at 17:14

## 2023-05-05 RX ADMIN — DOCUSATE SODIUM 100 MG: 100 CAPSULE, LIQUID FILLED ORAL at 09:53

## 2023-05-05 NOTE — ASSESSMENT & PLAN NOTE
- Sever constipation noted on CT abdomen  - Patient reports no BM in over a week  - Patient without any abdominal pain/tenderness or distention     Plan  - Clear liquid diet   - PEG  - Bisacodyl suppository   - Consider enema

## 2023-05-05 NOTE — H&P
[facility]  H&P  Name: Rupert Puckett 74 y o  female I MRN: [id number]  Unit/Bed#: S -01 I Date of Admission: 5/4/2023   Date of Service: 5/5/2023 I Hospital Day: 1      Assessment/Plan   * Hemorrhagic cystitis w/ pyelonephritis  Assessment & Plan  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  - On arrival patient hemodynamically stable, blood work showing leukocytosis (13 84), mild hyponatremia (132), anion gap (17) metabolic acidosis (1 292/43 7/26 9/2 9), MONROE (Cr 4 26; baseline 0 9 - 1 1)  - UA revealing innumerable WBC, RBC and occasional bacteria  - Urine grossly bloody requiring 3 way catheter for continuous irrigation as manley was clotting   - CT abd revealing no obstructive uropathy, mild left hydronephrosis and hydrorureter (pyelonephritis not excluded), persistent circumferential bladder wall thickening possibly indicating cystitis as well as severe constipation   - Physical exam notable for ecchymosis around the lower abdomen from patients home lovenox; denies any abdominal pain, dysuria, N/V, fever, chills  - Patient complaining of ongoing decreased appetite for last week and a half, fatigue   Also reports not having a BM for > 1 week  - Patient has history of UTI's last one in March, positive cultures for pseudomonas in '20   - Suspect bleed 2/2 to cystitis causing MONROE possibly resulting in super therapeutic dose of lovenox     Plan  - Nephro consult  - 150 mL/hr sodium bicarb D5; pH currently corrected but will maintain until serum bicarb normalized to avoid rebound acidosis  - Cefepime 500mg IV q24 (renally adjusted)  - F/u urine cx   - F/u blood cx   - Continue bladder irrigation until clear  - CBC  - CMP  - Phos  - Strict I/O's   - F/u APTT    Metabolic acidosis, increased anion gap  Assessment & Plan  - On arrival 7 206/18 5/86 7/7 2 (VBG)  - Anion gap of 17   - Serum bicarb of 11  - Suspect 2/2 to renal failure due to infection     Plan  - Sodium bicarb drip until correction THEN  - IVF  mL/hr   - Repeat VBG  - F/u BMP  - Replete electrolytes     Acute on chronic kidney disease  Assessment & Plan  -  Baseline Cr: 0 9-1 1  - Prior CDK3a now 5   - Suspect 2/2 to UTI   Estimated Creatinine Clearance: 10 5 mL/min (A) (by C-G formula based on SCr of 4 1 mg/dL (H))        Plan  - Continue IVF   - Avoid nephrotoxic drugs  - Avoid NSAIDs   - Avoid contrast mediated studies   - Maintain MAP >65    History of DVT (deep vein thrombosis)  Assessment & Plan  - Patient with DVT in 2017  - Currently on Lovenox 80 mg daily    Plan  - Hold lovenox in setting of MONROE  - Start heparin DVT prophylaxis  - VAS US LE     Hyperphosphatemia  Assessment & Plan  - Phosphate of 8 4 on admission  - In setting of acute on chronic disease     Plan  - Start PhosLo  - recheck phosphate     Constipation  Assessment & Plan  - Sever constipation noted on CT abdomen  - Patient reports no BM in over a week  - Patient without any abdominal pain/tenderness or distention     Plan  - Clear liquid diet   - PEG daily  - Bisacodyl suppository   - Consider enema   - Monitor for BM    Hyponatremia  Assessment & Plan  - Corrected from 132 to 135 during admission  - Currently corrected    Plan  - BMP    Endometrial cancer [facility])  Assessment & Plan  - Diagnosis '17; s/p total abdominal hysterectomy and bilateral salpingo oophorectomy   - Status post ENDOBARR (rucaparib, atezolizumab, and bevacizumab) clinical trial started in 12/2020 and finished 12/2022  - She is currently under active surveillance       VTE Prophylaxis: Heparin  / sequential compression device   Code Status: 1  POLST: POLST form is not discussed and not completed at this time  Anticipated Length of Stay:  Patient will be admitted on an Inpatient basis with an anticipated length of stay of  > 2 midnights  Justification for Hospital Stay: Hemorrhagic cystitis,     Chief Complaint:   Decreased appetite, fatigue     History of Present Illness:    Simón Ames is a 74 y o  female PMHx of endometrial cancer s/p chemotherapy currently in remission, DVT, CKD3, HTN, anemia, chronic UTI, hydronephrosis requiring nephroureteral stent placement 2/2 to cancer presenting to hospital at request of her Nephrologist for acutely worsening kidney function  Patient seen by nephrology a few days prior with lab showing worsening kidney function and metabolic acidosis  Patient advised to come to the hospital for further work-up  Of note patient reporting a week and a half of decreased appetite, fatigue  On arrival patient hemodynamically stable and without any acute complaints  Follow up blood work revealing leukocytosis, hyponatremia, and increased creatinine  Patient now CKD 5, eGFR of 10, Cr of 4 26 (baseline 0 9-1 1)  VBG showing metabolic acidosis of 5 626/72 5/96 4/9 2  US kidney and bladder revealing mild b/l hydronephrosis L > R, chronic circumferential bladder wall thickening  Follow up CT abdomen showing mild left hydronephrosis and hydroureter (pyelonephritis not excluded), persistent circumferential bladder wall thickening possibly indicating cystitis  In ED patient noted to have grossly bloody hematuria when manley placed ultimately requiring 3 way catheter for bladder irrigation due to clotting  Patient started on sodium bicarb drip and cefepime       On my examination patient currently undergoing bladder irrigation with drainage of pink tinged urine  Patient laying in bed in no acute distress  AAOx3  Patient only complaining of right sided flank pain (6/10), decreased appetite and fatigue  Denies any fever, chills, N/V, dysuria  Physical exam findings notable for ecchymosis over the lower abdomen; patient reports bruising from her home lovenox (80mg) for DVT prophylaxis  Review of Systems:    Review of Systems   Constitutional: Positive for appetite change and fatigue  Negative for chills, diaphoresis and fever  HENT: Negative for congestion and rhinorrhea  Eyes: Negative for visual disturbance  Respiratory: Negative for chest tightness and shortness of breath  Cardiovascular: Negative for chest pain and palpitations  Gastrointestinal: Positive for constipation  Negative for abdominal distention, abdominal pain, diarrhea, nausea and vomiting  Endocrine: Negative for polyuria  Genitourinary: Positive for flank pain (right side) and hematuria  Negative for difficulty urinating, dysuria and pelvic pain  Skin: Positive for color change  Neurological: Negative for dizziness, light-headedness and headaches         Past Medical and Surgical History:     Past Medical History:   Diagnosis Date   • Anxiety    • CKD (chronic kidney disease) stage 3, GFR 30-59 ml/min ([facility] [address] )    • COVID     Fall 2022   • Depression    • Endometrial cancer ([facility] [address] ) 12/2017   • GERD (gastroesophageal reflux disease)    • H/O gastric bypass    • Hard to intubate     pt denies   • History of chemotherapy     started 12/2021endometrial cancer- done 12/23/22   • History of DVT in adulthood     RLE   • Hyperglycemia     vx type 2 dm -- last assessed 4/1/14; resolved 11/7/17   • Hyperlipidemia    • Hypertension     in past- no meds at present   • Iron deficiency anemia     iron infusions weekly   • OAB (overactive bladder)    • Port-A-Cath in place    • Wears glasses        Past Surgical History:   Procedure Laterality Date   • ABDOMINAL SURGERY      GASTRIC BYPASS; 2001   • BREAST BIOPSY Right 06/28/2019    core biopsy; benign   • CHOLECYSTECTOMY      at the time of gastric bypass   • COLONOSCOPY     • CT NEEDLE BIOPSY LYMPH NODE  07/08/2019   • FL GUIDED CENTRAL VENOUS ACCESS DEVICE INSERTION  11/12/2019   • FL RETROGRADE PYELOGRAM  3/30/2023   • GASTRIC BYPASS     • HYSTERECTOMY Bilateral 2017    total abdominal with salpingo-oophorectomy   • IR NEPHROSTOMY TO NEPHROURETERAL STENT  06/09/2022   • IR NEPHROSTOMY TO NEPHROURETERAL STENT  12/20/2022   • IR NEPHROSTOMY TUBE CHECK/CHANGE/REPOSITION/REINSERTION/UPSIZE  05/27/2022   • IR NEPHROSTOMY TUBE PLACEMENT  07/26/2019   • IR NEPHROURETERAL STENT CHECK/CHANGE/REPOSITION  04/06/2021   • IR NEPHROURETERAL STENT CHECK/CHANGE/REPOSITION  06/04/2021   • IR NEPHROURETERAL STENT CHECK/CHANGE/REPOSITION  09/03/2021   • IR NEPHROURETERAL STENT CHECK/CHANGE/REPOSITION  12/07/2021   • IR NEPHROURETERAL STENT CHECK/CHANGE/REPOSITION  03/08/2022   • IR NEPHROURETERAL STENT CHECK/CHANGE/REPOSITION  05/10/2022   • IR NEPHROURETERAL STENT CHECK/CHANGE/REPOSITION  09/19/2022   • IR PICC LINE  09/27/2019   • IR PORT PLACEMENT  07/26/2019   • IR PORT REMOVAL  09/20/2019   • OOPHORECTOMY Bilateral 2017   • MT CYSTO BLADDER W/URETERAL CATHETERIZATION Right 3/30/2023    Procedure: CYSTOSCOPY RETROGRADE PYELOGRAM WITH exchange of STENT URETERAL;  Surgeon: Brenda Reyes MD;  [facility];  Service: Urology   • MT CYSTO W/INSERT URETERAL STENT Right 3/30/2023    Procedure: EXCHANGE STENT URETERAL;  Surgeon: Brenda Reyes MD;  [facility];  Service: Urology   • MT INSJ TUNNELED CTR VAD W/SUBQ PORT AGE 5 YR/> Left 11/12/2019    Procedure: INSERTION VENOUS PORT ( PORT-A-CATH) IR;  Surgeon: Kimberly Goodpasture, DO;  [facility];  Service: Interventional Radiology   • MT LAPS ABD PRTM&OMENTUM DX W/WO SPEC BR/WA SPX N/A 12/19/2017    Procedure: LAPAROSCOPY DIAGNOSTIC;  Surgeon: Jaiden Call MD;  Location: [facility]; Service: Gynecology Oncology   • CO LAPS W/RAD HYST W/BILAT QUARTERMAIN RMVL TUBE/OVARY N/A 12/19/2017    Procedure: HYSTERECTOMY LAPAROSCOPIC TOTAL ([address]) W/ ROBOTICS; BILATERAL SALPINGOOPHERECTOMY; LYMPH NODE DISSECTION; lysis of adhesions;  Surgeon: Servando Salazar MD;  Location: [facility];  Service: Gynecology Oncology   • TONSILLECTOMY     • US GUIDED BREAST BIOPSY RIGHT COMPLETE Right 06/28/2019       Meds/Allergies:    Prior to Admission medications    Medication Sig Start Date End Date Taking? Authorizing Provider   ARIPiprazole (ABILIFY) 20 MG tablet Take 20 mg by mouth in the morning  2/10/22   Historical Provider, MD   aspirin (ECOTRIN LOW STRENGTH) 81 mg EC tablet Take 81 mg by mouth every evening    Historical Provider, MD   Calcium-Magnesium-Vitamin D (CALCIUM 500 PO) Take by mouth daily     Historical Provider, MD   cholecalciferol (VITAMIN D3) 1,000 units tablet Take 4 tablets (4,000 Units total) by mouth daily 2/1/23   Adwoa Honey,    Cranberry-Vitamin C 250-60 MG CAPS Take 1 tablet by mouth in the morning 7/12/22   Florentino DayRAFITA   Cyanocobalamin (VITAMIN B12 PO) Take by mouth daily     Historical Provider, MD   dronabinol (MARINOL) 2 5 mg capsule Take 1 capsule (2 5 mg total) by mouth 2 (two) times a day before meals 8/26/22   Debo Renteria PA-C   enoxaparin (LOVENOX) 80 mg/0 8 mL INJECT 0 8ML (80MG TOTAL)  SUBCUTANEOUSLY EVERY 24  HOURS 1/3/23   Gamal Shrestha PA-C   folic acid (FOLVITE) 1 mg tablet Take 1 tablet (1 mg total) by mouth daily 10/8/19   Ayleena RumDO yared   Gemtesa 75 MG TABS TAKE 75 MG BY MOUTH IN THE MORNING 12/14/22   Heber Skates RAFITA Muñiz   Multiple Vitamin (MULTIVITAMIN) tablet Take 1 tablet by mouth daily    Historical Provider, MD   naloxone (NARCAN) 4 mg/0 1 mL nasal spray Administer 1 spray into a nostril  If no response after 2-3 minutes, give another dose in the other nostril using a new spray   11/9/21   Rose Marie Hagan MD   omeprazole ([address]) 20 mg delayed release capsule Take 20 mg by mouth daily    Historical Provider, MD   oxybutynin (DITROPAN XL) 15 MG 24 hr tablet Take 1 tablet (15 mg total) by mouth daily at bedtime 10/5/22   RAFITA Farooq   saccharomyces boulardii (FLORASTOR) 250 mg capsule Take 1 capsule (250 mg total) by mouth 2 (two) times a day 10/8/19   Clyde Bianchi DO   senna (SENOKOT) 8 6 MG tablet Take 1 tablet (8 6 mg total) by mouth 2 (two) times a day as needed for constipation 5/31/22 April RAFITA Hubbard   sodium chloride, PF, 0 9 % 10 mL by Intracatheter route daily  Patient not taking: Reported on 4/28/2023 9/19/22   Pam Ramesh PA-C   [facility]) 75 mg tablet Take 75 mg by mouth 3 (three) times a day      Historical Provider, MD     I have reviewed home medications with patient personally  Allergies: Allergies   Allergen Reactions   • Cephalosporins Rash     Which Cephalosporin reaction was to not specified; however, has tolerated Amoxicillin, Cefazolin, and Cefepime       Social History:     Marital Status: Single   Occupation: Retired   Patient Pre-hospital Living Situation: Home with daughter   Patient Pre-hospital Level of Mobility: Ambulates  Patient Pre-hospital Diet Restrictions: None   Substance Use History:   Social History     Substance and Sexual Activity   Alcohol Use Not Currently     Social History     Tobacco Use   Smoking Status Never   Smokeless Tobacco Never     Social History     Substance and Sexual Activity   Drug Use No       Family History:    non-contributory    Physical Exam:     Vitals:   Blood Pressure: 112/78 (05/05/23 0025)  Pulse: 82 (05/05/23 0025)  Temperature: 97 9 °F (36 6 °C) (05/05/23 0025)  Temp Source: Oral (05/05/23 0025)  Respirations: 18 (05/05/23 0025)  SpO2: 100 % (05/05/23 0025)    Physical Exam  Constitutional:       General: She is not in acute distress  Appearance: Normal appearance  She is not ill-appearing or toxic-appearing     HENT: Head: Normocephalic and atraumatic  Right Ear: External ear normal       Left Ear: External ear normal       Nose: Nose normal    Eyes:      Extraocular Movements: Extraocular movements intact  Conjunctiva/sclera: Conjunctivae normal    Cardiovascular:      Rate and Rhythm: Normal rate and regular rhythm  Pulses: Normal pulses  Heart sounds: Normal heart sounds  Pulmonary:      Effort: Pulmonary effort is normal       Breath sounds: Normal breath sounds  No wheezing, rhonchi or rales  Abdominal:      General: Abdomen is flat  There is no distension  Palpations: Abdomen is soft  There is no mass  Tenderness: There is no abdominal tenderness  There is right CVA tenderness  There is no guarding  Comments: Bruising around the lower abdomen reportedly from lovenox injections    Musculoskeletal:         General: No swelling or tenderness  Right lower leg: No edema  Left lower leg: No edema  Skin:     General: Skin is warm and dry  Neurological:      Mental Status: She is alert  Motor: No weakness  Gait: Gait normal          Additional Data:     Lab Results: I have personally reviewed pertinent reports  Results from last 7 days   Lab Units 05/05/23  0024 05/04/23  1357   WBC Thousand/uL  --  13 84*   HEMOGLOBIN g/dL  --  9 4*   HEMATOCRIT %  --  31 4*   PLATELETS Thousands/uL 292 393*   EOS PCT %  --  0     Results from last 7 days   Lab Units 05/04/23  2157 05/04/23  1357   POTASSIUM mmol/L 4 6 4 6   CHLORIDE mmol/L 109* 103   CO2 mmol/L 11* 12*   BUN mg/dL 87* 89*   CREATININE mg/dL 4 10* 4 26*   CALCIUM mg/dL 8 7 9 2   ALK PHOS U/L  --  118*   ALT U/L  --  7   AST U/L  --  7*           Imaging: I have personally reviewed pertinent reports  CT abdomen pelvis wo contrast    Result Date: 5/4/2023  Narrative: CT ABDOMEN AND PELVIS WITHOUT IV CONTRAST INDICATION:   Acute renal failure   COMPARISON: 3/15/2023 TECHNIQUE:  CT examination of the abdomen and pelvis was performed without intravenous contrast  Multiplanar 2D reformatted images were created from the source data  Radiation dose length product (DLP) for this visit:  456 mGy-cm   This examination, like all CT scans performed in the [facility], was performed utilizing techniques to minimize radiation dose exposure, including the use of iterative reconstruction and automated exposure control  Enteric contrast was administered  FINDINGS: ABDOMEN LOWER CHEST: Clear lung bases LIVER/BILIARY TREE:  Unremarkable  GALLBLADDER: Cholecystectomy  SPLEEN:  Unremarkable  PANCREAS:  Unremarkable  ADRENAL GLANDS:  Unremarkable  KIDNEYS/URETERS: Right ureteral stent in expected position  No evidence of right hydronephrosis or hydroureter  Stable exophytic right renal 2 3 cm cyst  Previously demonstrated subcapsular collection has resolved  Mild left hydronephrosis and hydroureter  No obstructing calculus  Nonobstructing 3 mm calculus in the lower pole calyx  STOMACH AND BOWEL: Postsurgical change from gastric bypass  Large amount of stool markedly distends the entire colon  No evidence of colitis, diverticulitis or bowel obstruction  APPENDIX:  No findings to suggest appendicitis  ABDOMINOPELVIC CAVITY: Stable aortocaval lymph node with calcification measuring 1 4 cm  No retrocrural lymphadenopathy  VESSELS: No abdominal aortic aneurysm  PELVIS REPRODUCTIVE ORGANS: No adnexal mass or cyst  URINARY BLADDER: Circumferential bladder wall thickening, similar to the previous exam ABDOMINAL WALL/INGUINAL REGIONS:  Unremarkable  OSSEOUS STRUCTURES:  No acute fracture or destructive osseous lesion  Impression: 1  Right ureteral stent in expected position  Resolution of subcapsular collection  No obstructive uropathy  2  Mild left hydronephrosis and hydroureter  No obstructing calculus  Pyelonephritis not excluded  3  Persistent circumferential bladder wall thickening could indicate cystitis   4  Severe "constipation  Workstation performed: [id number]     US kidney and bladder with pvr    Result Date: 5/4/2023  Narrative: RENAL ULTRASOUND WITH PVR INDICATION:   severe MONROE  \"74year-old female coming into the ED for evaluation of abnormal labs  She was seen by her nephrologist who did outpatient blood work and found her to have acute kidney injury with a creatinine over 4   \" COMPARISON: CT chest abdomen pelvis 3/15/2023 TECHNIQUE:   Ultrasound of the retroperitoneum was performed with a curvilinear transducer utilizing volumetric sweeps and still imaging techniques  FINDINGS: KIDNEYS: Symmetric and normal size  Right kidney:  8 7 x 4 3 x 4 2 cm  Volume 83 1 mL Left kidney:  10 1 x 5 6 x 4 7 cm  Volume 138 5 mL Right kidney Echogenic cortex in keeping with medical renal disease  Partially visualized right nephroureteral stent  Normal echogenicity and contour  Simple 3 cm upper pole cyst  Mild right hydronephrosis  No shadowing calculi  Trace perinephric fluid  Left kidney The renal parenchyma is diffusely echogenic consistent with medical renal disease  No mass is identified  Mild left hydronephrosis more prominent than the right kidney  Shadowing 4 mm lower pole calculus  No perinephric fluid collections  URETERS: Nonvisualized  BLADDER: Normally distended  Chronic circumferential wall thickening  No mass lesions  Bilateral ureteral jets detected  Prevoid: 99 8 Tech note states that the patient was unable to void due to incontinence  Impression: The nephroureteral stent is in place  Mild bilateral hydronephrosis more prominent on the left  Chronic circumferential bladder wall thickening  Workstation performed: [id number]       EKG, Pathology, and Other Studies Reviewed on Admission:   · EKG: NS rhythm, non-specific ST wave changes, left axis deviation     Epic / Care Everywhere Records Reviewed: Yes     ** Please Note: This note has been constructed using a voice recognition system   **    "

## 2023-05-05 NOTE — UTILIZATION REVIEW
Initial Clinical Review    Admission: Date/Time/Statement:   Admission Orders (From admission, onward)     Ordered        05/04/23 2222  INPATIENT ADMISSION  Once                      Orders Placed This Encounter   Procedures   • INPATIENT ADMISSION     Standing Status:   Standing     Number of Occurrences:   1     Order Specific Question:   Level of Care     Answer:   Med Surg [16]     Order Specific Question:   Estimated length of stay     Answer:   More than 2 Midnights     Order Specific Question:   Certification     Answer:   I certify that inpatient services are medically necessary for this patient for a duration of greater than two midnights  See H&P and MD Progress Notes for additional information about the patient's course of treatment  ED Arrival Information     Expected   5/4/2023     Arrival   5/4/2023 16:08    Acuity   Urgent            Means of arrival   Walk-In    Escorted by   Family Member    Service   Hospitalist    Admission type   Emergency            Arrival complaint   MONROE (acute kidney injury)           Chief Complaint   Patient presents with   • Abnormal Lab     Presents today after being told by her nephrologist to come in for evaluation of abnormal labs  Denies pain or urinary symptoms  Initial Presentation: 74 y o  female PMHx of endometrial cancer s/p chemotherapy currently in remission, DVT, CKD3, HTN, anemia, chronic UTI, hydronephrosis requiring nephroureteral stent placement 2/2 to cancer who presents to ED from home at request of her Nephrologist for acutely worsening kidney function and metabolic acidosis on outpt labs   Pt reports decreased appetite and fatigue x 1 5 weeks, no bm x >1 week   On exam,pt has grossly bloody urine when manley placed ultimately requiring 3 way catheter for bladder irrigation due to clotting  R flank pain , ecchymosis over the lower abdomen; patient reports bruising from her home lovenox (80mg) for DVT prophylaxis  Prior CDK3a now 5       Labs - low sodium 132, phos elevated 8 4 , WBC elevated 13 84, increased creat 4 26 (baseline 0 9-1 1)     AG 17  Bicarb 11  VBG - metabolic acidosis of 6 422/14 9/49 5/2 7  US kidney and bladder -mild bilat hydronephrosis L > R, chronic circumferential bladder wall thickening  CT A/P shows mild left hydronephrosis and hydroureter (pyelonephritis not excluded), possible cystitis and severe constipation  Patient started on sodium bicarb drip and cefepime in ED  Pt admitted as Inpatient with hemorrhagic cystitis w/ pyelonephritis,MONROE metabolic acidosis w/ increased anion gap  Plan - nephro consult, IVF - bicarb drip, IV Cefepime  F/U blood and urine cx  Strict I/O  Continue CBI until clear  Monitor BMP, replete lytes as needed  CBC  Urban [place] MAP >65  Hold Lovenox  Start heparin DVT ppx  Venous duplex  Start PhosLo  Recheck phosphate   CL diet  Bowel regime, monitor for BM  Date: 5/5  Day 2:    Hemorrhagic cystitis with pyelonephritis in the setting of MONROE and metabolic acidosis  Patient had 1 bowel movement this morning after receiving a dose of rectal suppository  Pt continues on Bicarb drip until correction   IV fluid normal saline 100 ml/ hour  Creat downtrending   Continue IV cefepime   AG 14 this am,bicarb 19  Recheck VBG and BMP 1500 today  Hold home Lovenox   Heparin DVT ppx  CL diet   Sifuentes cath w/ CBI -urine pink early this am with mucous/sediment  Venous duplex neg for DVT BLE  Nephrology consult- significant improvement with IV sodium bicarb since admission  Acidosis improved to 7 309 after initiation of bicarb IVF   MONROE may be secondary to her poor oral intake over the past week, or to her urinary retention as evidenced by bladder scan of 240 mL and hydronephrosis on imaging  Continuing IV bicarb 150 mEq with 5% dextrose at 100 mL/h  Bladder irrigation as required for hemorrhagic cystitis, maintain catheterization to relieve obstruction   Urine output this morning appeared light pink with white debris and no clots   BMP daily  Monitor phosphorus daily, now downtrending 8 6 --> 6 8  Mag low1 5 today- repletion ordered  Monitor Mag daily   ED Triage Vitals   Temperature Pulse Respirations Blood Pressure SpO2   05/04/23 1727 05/04/23 1727 05/04/23 1727 05/04/23 1727 05/04/23 1727   97 7 °F (36 5 °C) 88 16 99/60 99 %      Temp Source Heart Rate Source Patient Position - Orthostatic VS BP Location FiO2 (%)   05/04/23 1727 05/04/23 1727 05/04/23 1727 05/04/23 1727 --   Oral Monitor Lying Right arm       Pain Score       05/05/23 0025       6          Wt Readings from Last 1 Encounters:   04/28/23 52 6 kg (116 lb)     Additional Vital Signs:   Date/Time Temp Pulse Resp BP MAP (mmHg) SpO2   05/05/23 06:05:04 98 °F (36 7 °C) 70 -- 116/66 83 98 %   05/05/23 0025 97 9 °F (36 6 °C) 82 18 112/78 -- 100 %   05/04/23 1945 -- 71 18 114/64 83 --     Pertinent Labs/Diagnostic Test Results:   CT abdomen pelvis wo contrast   Final Result by Patrica Fuentes MD (05/04 2143)         1  Right ureteral stent in expected position  Resolution of subcapsular collection  No obstructive uropathy  2  Mild left hydronephrosis and hydroureter  No obstructing calculus  Pyelonephritis not excluded  3  Persistent circumferential bladder wall thickening could indicate cystitis  4  Severe constipation  Workstation performed: [id number]         US kidney and bladder with pvr   Final Result by Gallito Champion MD (05/04 1952)      The nephroureteral stent is in place  Mild bilateral hydronephrosis more prominent on the left  Chronic circumferential bladder wall thickening  Workstation performed: [id number]         VAS lower limb venous duplex study, complete bilateral    5/5/23   RIGHT LOWER LIMB:  No evidence of acute or chronic deep vein thrombosis  No evidence of superficial thrombophlebitis noted  No evidence of valvular incompetence noted in the deep veins    Popliteal, posterior tibial and anterior tibial arterial Doppler waveform's are  triphasic  LEFT LOWER LIMB:  No evidence of acute or chronic deep vein thrombosis  No evidence of superficial thrombophlebitis noted  No evidence of valvular incompetence noted in the deep veins  Popliteal, posterior tibial and anterior tibial arterial Doppler waveform's are  triphasic  Tech Note:  There is an echogenic structure located in the Right inguinal region measuring  approximately 1 83 cm suggestive of enlarged lymphatic channels  This result has not been signed  Information might be incomplete                Results from last 7 days   Lab Units 05/05/23 0537 05/05/23 0024 05/04/23  1357   WBC Thousand/uL 13 02*  --  13 84*   HEMOGLOBIN g/dL 8 0*  --  9 4*   HEMATOCRIT % 26 1*  --  31 4*   PLATELETS Thousands/uL 328 292 393*   NEUTROS ABS Thousands/µL 11 09*  --   --          Results from last 7 days   Lab Units 05/05/23 0537 05/04/23 2157 05/04/23  1357   SODIUM mmol/L 137 135 132*   POTASSIUM mmol/L 3 2* 4 6 4 6   CHLORIDE mmol/L 104 109* 103   CO2 mmol/L 19* 11* 12*   ANION GAP mmol/L 14* 15* 17*   BUN mg/dL 78* 87* 89*   CREATININE mg/dL 3 60* 4 10* 4 26*   EGFR ml/min/1 73sq m 12 10 10   CALCIUM mg/dL 8 2* 8 7 9 2   MAGNESIUM mg/dL 1 5*  --   --    PHOSPHORUS mg/dL 6 8*  --  8 4*     Results from last 7 days   Lab Units 05/05/23 0537 05/04/23  1357   AST U/L 6* 7*   ALT U/L 5* 7   ALK PHOS U/L 94 118*   TOTAL PROTEIN g/dL 7 3 8 9*   ALBUMIN g/dL 3 0* 3 7   TOTAL BILIRUBIN mg/dL 0 32 0 33         Results from last 7 days   Lab Units 05/05/23 0537 05/04/23 2157 05/04/23  1357   GLUCOSE RANDOM mg/dL 291* 81 108             No results found for: BETA-HYDROXYBUTYRATE       Results from last 7 days   Lab Units 05/05/23  0024 05/04/23  1941   PH MIESHA  7 309 7 207*   PCO2 MIESHA mm Hg 66 5* 18 5*   PO2 MIESHA mm Hg 35 6 86 7*   HCO3 MIESHA mmol/L 32 7* 7 2*   BASE EXC MIESHA mmol/L 5 3 -18 9   O2 CONTENT MIESHA ml/dL 7 7 11 0   O2 HGB, VENOUS % 62 9 91 2*                     Results from last 7 days   Lab Units 05/05/23  0537   PTT seconds 36                             Results from last 7 days   Lab Units 05/04/23  1357   FERRITIN ng/mL 1,618*   IRON SATURATION % 39   IRON ug/dL 74   TIBC ug/dL 188*                                 Results from last 7 days   Lab Units 05/04/23  2127   CLARITY UA  Extra Turbid   COLOR UA  Light Orange   SPEC GRAV UA  1 006   PH UA  5 0   GLUCOSE UA mg/dl Negative   KETONES UA mg/dl Negative   BLOOD UA  Large*   PROTEIN UA mg/dl 30 (1+)*   NITRITE UA  Negative   BILIRUBIN UA  Negative   UROBILINOGEN UA (BE) mg/dl <2 0   LEUKOCYTES UA  Large*   WBC UA /hpf Innumerable*   RBC UA /hpf Innumerable*   BACTERIA UA /hpf Occasional   EPITHELIAL CELLS WET PREP /hpf None Seen                                 Results from last 7 days   Lab Units 05/04/23  2157 05/04/23  2156   BLOOD CULTURE  Received in Microbiology Lab  Culture in Progress  Received in Microbiology Lab  Culture in Progress       Results from last 7 days   Lab Units 05/04/23  1357   TOTAL COUNTED  100           ED Treatment:   Medication Administration from 05/04/2023 1520 to 05/05/2023 0014       Date/Time Order Dose Route Action     05/04/2023 2152 EDT sodium chloride 0 9 % bolus 1,000 mL 0 mL Intravenous Stopped     05/04/2023 1922 EDT sodium chloride 0 9 % bolus 1,000 mL 1,000 mL Intravenous New Bag     05/04/2023 2107 EDT sodium bicarbonate 150 mEq in dextrose 5 % 1,000 mL infusion 100 mL/hr Intravenous New Bag     05/04/2023 2230 EDT cefepime (MAXIPIME) 2 g/50 mL dextrose IVPB 0 mg Intravenous Stopped     05/04/2023 2200 EDT cefepime (MAXIPIME) 2 g/50 mL dextrose IVPB 2,000 mg Intravenous New Bag        Past Medical History:   Diagnosis Date   • Anxiety    • CKD (chronic kidney disease) stage 3, GFR 30-59 ml/min ([facility] [address] )    • COVID     Fall 2022   • Depression    • Endometrial cancer ([facility] [address] ) 12/2017   • GERD (gastroesophageal reflux disease)    • H/O gastric bypass    • Hard to intubate     pt denies   • History of chemotherapy     started 12/2021endometrial cancer- done 12/23/22   • History of DVT in adulthood     RLE   • Hyperglycemia     vx type 2 dm -- last assessed 4/1/14; resolved 11/7/17   • Hyperlipidemia    • Hypertension     in past- no meds at present   • Iron deficiency anemia     iron infusions weekly   • OAB (overactive bladder)    • Port-A-Cath in place    • Wears glasses      Present on Admission:  • Endometrial cancer ([facility] )  • Acute on chronic kidney disease  • Hemorrhagic cystitis w/ pyelonephritis  • Hyponatremia  • Metabolic acidosis, increased anion gap  • Hyperphosphatemia  • Constipation      Admitting Diagnosis: Dehydration [E86 0]  Hemorrhagic cystitis [N30 91]  UTI (urinary tract infection) [N39 0]  Hematuria [R31 9]  Abnormal laboratory test result [R89 9]  Acute kidney injury ([facility] ) [N17 9]  Age/Sex: 74 y o  female  Admission Orders:  Scheduled Medications:  ARIPiprazole, 20 mg, Oral, Daily  aspirin, 81 mg, Oral, QPM  [START ON 5/6/2023] calcium acetate, 667 mg, Oral, TID With Meals  cefepime, 500 mg, Intravenous, Q24H  docusate sodium, 100 mg, Oral, BID  folic acid, 1 mg, Oral, Daily  heparin (porcine), 5,000 Units, Subcutaneous, Q8H CHRISTIANO  magnesium sulfate, 2 g, Intravenous, Once  oxybutynin, 15 mg, Oral, HS  pantoprazole, 40 mg, Oral, Early Morning  polyethylene glycol, 17 g, Oral, Daily  saccharomyces boulardii, 250 mg, Oral, BID  venlafaxine, 75 mg, Oral, TID    potassium chloride (K-DUR,KLOR-CON) CR tablet 40 mEq  Dose: 40 mEq  Freq: Once Route: PO  Start: 05/05/23 0845 End: 05/05/23 0953  bisacodyl (DULCOLAX) rectal suppository 10 mg  Dose: 10 mg  Freq: Once Route: RE  Start: 05/05/23 0330 End: 05/05/23 0557  magnesium sulfate 2 g/50 mL IVPB (premix) 2 g  Dose: 2 g  Freq:  Once Route: IV  Last Dose: 2 g (05/05/23 0948)  Start: 05/05/23 0845 End: 05/05/23 1148      Continuous IV Infusions:  sodium bicarbonate infusion, 100 mL/hr, Intravenous, Continuous      PRN Meds:  acetaminophen, 650 mg, Oral, Q4H PRN  bisacodyl, 10 mg, Rectal, Once PRN  oxyCODONE, 5 mg, Oral, Q4H PRN  oxyCODONE, 2 5 mg, Oral, Q4H PRN    CL diet   CBI      IP CONSULT TO NEPHROLOGY    Network Utilization Review Department  ATTENTION: Please call with any questions or concerns to [phone] and carefully listen to the prompts so that you are directed to the right person  All voicemails are confidential   Yvonne Lopez all requests for admission clinical reviews, approved or denied determinations and any other requests to dedicated fax number below belonging to the campus where the patient is receiving treatment   List of dedicated fax numbers for the Facilities:  [address] DENIALS (Administrative/Medical Necessity) [phone]   [address] (Maternity/NICU/Pediatrics) [phone]   [address] [phone]   [facility] [phone]   [address] [address] [facility] [place] [address] [address] [facility] 28 [phone]   [address] [facility] 134 815 [facility] [phone]

## 2023-05-05 NOTE — ASSESSMENT & PLAN NOTE
- Patient with DVT in 2017  - Currently on Lovenox 80 mg daily    Plan  - Hold lovenox in setting of MONROE  - Start heparin DVT prophylaxis  - VAS US LE

## 2023-05-05 NOTE — CONSULTS
Consultation - Nephrology   Ragini Melendez 74 y o  female MRN: [id number]  Unit/Bed#: S -54 Encounter: [id number]      Assessment/Plan     Assessment:  The patient presents with MONROE from her outpatient nephrologist, with outpatient labs showing Cr elevation to 4 26, and anion gap of 17 with bicarb of 12 on the day of presentation  She has had significant improvement with IV sodium bicarb since admission  Her MONROE may be secondary to her poor oral intake over the past week, or to her urinary retention as evidenced by bladder scan of 240 mL and hydronephrosis on imaging  Metabolic acidosis on presentation suspected to be secondary to her MONROE at this time  Hematuria and bladder wall thickening with her urinalysis of innumerable WBC/RBCs and moderate bacteria raise suspicion for UTI/cystitis  Plan:  1  MONROE  -Patient presented with creatinine of 4 26 on outpatient labs 5/4, POA creatinine 4 10  -Improved overnight to 3 60 while on sodium bicarbonate with 5% dextrose   -Baseline creatinine 0 9 - 1 1  -Continuing IV bicarb 150 mEq with 5% dextrose at 100 mL/h, will adjust fluids after afternoon labs  -Bladder irrigation as required for hemorrhagic cystitis, maintain catheterization to relieve obstruction   -Avoid nephrotoxic agents, IV contrast, NSAIDs   -Consider repeat renal ultrasound on Sunday or Monday  -Monitor on BMP daily  2  Metabolic acidosis  -POA VBG: pH 7 207, PCO2 18 5, PO2 86 7, HCO3 7 2   -Acidosis improved to 7 309 after initiation of bicarb IVF  -Continuing IVF as above    3  Hemorrhagic cystitis  -Bladder irrigation per primary team  Consider urology consult if hematuria is not improving    -Recommend renal dosing of cefepime as 1 g per day  -Urine output this morning appeared light pink with white debris and no clots   -Following Urine culture, blood cultures      4  CKD stage III  -Avoid nephrotoxic agents, IV contrast, NSAIDs   -Baseline creatinine 0 9-1 1, most recent outpatient labs in March showed elevated creatinine of 1 60   -Maintaining IV hydration as above  5   Nephrotic range proteinuria  -The patient has been following with Dr Tay Caicedo with nephrology, who has been working up proteinuria  -Urine albumin to creatinine ratio has been steadily rising since June 2022, most recently elevated to  -Suspected to be secondary to bevacizumab, which was held on 9/9/2022, though additional work-up has been pursued  -CHERI 1: 80, dsDNA negative, no complementemia, hepatitis B/hepatitis C/HIV negative, SPEP negative x2, serum free light chain ratio 1 5, RF negative, cryoglobulin  -Pending outpatient labs to follow from 5/4/2023: Phospholipase A2 receptor antibodies, methylmalonic acid level, immunoglobulin free LT chains, protein electrophoresis serum, cryoglobulin level   -Avoiding RAAS inhibition due to low systolic blood pressure as outpatient   -Outpatient plan was to assess kidney function with nuclear medicine scan to determine which kidney to biopsy  6   Hyperphosphatemia  -Continuing PhosLo phosphate binder 667 3 times daily with meals  -Monitor phosphorus daily, now downtrending 8 6 --> 6 8    7  Hypomagnesemia  -Agree with repletion as per primary team  (1 5 this AM )  -Monitor Mg level daily  8  Endometrial cancer (in remission)  -Status post rucaparib, atezolizumab, and bevacizumab, from December 2020 - December 2022   -Currently monitoring for recurrence   -Had previously involved the right ureter causing stenosis, leading to her current ureteral stent        History of Present Illness   Physician Requesting Consult: Evie Farmer MD  Reason for Consult / Principal Problem: MONROE, metabolic acidosis  Hx and PE limited by:   HPI: Nciholas Young is a 74y o  year old female has a past medical history of endometrial cancer s/p chemotherapy from [id number], currently under surveillance, right-sided hydronephrosis s/p ureteral stenting, CKD stage III, DVT, HTN, anemia, and UTI with cystitis, who presents on recommendation of her nephrologist for MONROE and metabolic acidosis  The patient reports that over the past 2 weeks she has had pink-tinged urine, without any burning on urination, suprapubic tenderness, or incomplete voiding  She also had right-sided costovertebral angle tenderness over this time, though this has improved since her urinary catheter was placed  Additionally, she had fatigue and decreased oral intake over the past week and a half stating that she ate small amounts of her typical meals in the day, with reduced liquid intake as well  She also reports constipation during this time, but has had small bowel movements today  She denies fevers, chills, shortness of breath, chest pain, abdominal pain, nausea, vomiting, or diarrhea  She denies swelling of the extremities  In the ED she had undergone renal ultrasound and abdomen pelvis CT which has demonstrated appropriate positioning of her ureteral stent and hydronephrosis  Urinary retention was identified with 240 mL in the bladder  Gross hematuria was produced after manley placement and bladder irrigation was started  History obtained from chart review and the patient    Inpatient consult to Nephrology  Consult performed by: Daphnie Jeffery MD  Consult ordered by: Cesar Bear DO          Review of Systems   Constitutional: Positive for fatigue  Negative for chills and fever  HENT: Negative for rhinorrhea, sore throat and trouble swallowing  Eyes: Negative for redness and visual disturbance  Respiratory: Negative for cough and shortness of breath  Cardiovascular: Negative for chest pain, palpitations and leg swelling  Gastrointestinal: Positive for constipation  Negative for abdominal pain, diarrhea, nausea and vomiting  Genitourinary: Positive for flank pain  Negative for difficulty urinating, dysuria, hematuria and urgency  Musculoskeletal: Negative for arthralgias and myalgias     Skin: Negative for color change and rash  Neurological: Positive for weakness  Negative for dizziness, syncope and light-headedness  Psychiatric/Behavioral: Negative for agitation, behavioral problems and confusion  All other systems reviewed and are negative        Historical Information   Past Medical History:   Diagnosis Date   • Anxiety    • CKD (chronic kidney disease) stage 3, G[facility] 30-59 ml[facility] )    • COVID     Fall 2022   • Depression    • Endometrial cancer ([facility] ) 12/2017   • GERD (gastroesophageal reflux disease)    • H/O gastric bypass    • Hard to intubate     pt denies   • History of chemotherapy     started 12/2021endometrial cancer- done 12/23/22   • History of DVT in adulthood     RLE   • Hyperglycemia     vx type 2 dm -- last assessed 4/1/14; resolved 11/7/17   • Hyperlipidemia    • Hypertension     in past- no meds at present   • Iron deficiency anemia     iron infusions weekly   • OAB (overactive bladder)    • Port-A-Cath in place    • Wears glasses      Past Surgical History:   Procedure Laterality Date   • ABDOMINAL SURGERY      GASTRIC BYPASS; 2001   • BREAST BIOPSY Right 06/28/2019    core biopsy; benign   • CHOLECYSTECTOMY      at the time of gastric bypass   • COLONOSCOPY     • CT NEEDLE BIOPSY LYMPH NODE  07/08/2019   • FL GUIDED CENTRAL VENOUS ACCESS DEVICE INSERTION  11/12/2019   • FL RETROGRADE PYELOGRAM  3/30/2023   • GASTRIC BYPASS     • HYSTERECTOMY Bilateral 2017    total abdominal with salpingo-oophorectomy   • IR NEPHROSTOMY TO NEPHROURETERAL STENT  06/09/2022   • IR NEPHROSTOMY TO NEPHROURETERAL STENT  12/20/2022   • IR NEPHROSTOMY TUBE CHECK/CHANGE/REPOSITION/REINSERTION/UPSIZE  05/27/2022   • IR NEPHROSTOMY TUBE PLACEMENT  07/26/2019   • IR NEPHROURETERAL STENT CHECK/CHANGE/REPOSITION  04/06/2021   • IR NEPHROURETERAL STENT CHECK/CHANGE/REPOSITION  06/04/2021   • IR NEPHROURETERAL STENT CHECK/CHANGE/REPOSITION  09/03/2021   • IR NEPHROURETERAL STENT CHECK/CHANGE/REPOSITION 12/07/2021   • IR NEPHROURETERAL STENT CHECK/CHANGE/REPOSITION  03/08/2022   • IR NEPHROURETERAL STENT CHECK/CHANGE/REPOSITION  05/10/2022   • IR NEPHROURETERAL STENT CHECK/CHANGE/REPOSITION  09/19/2022   • IR PICC LINE  09/27/2019   • IR PORT PLACEMENT  07/26/2019   • IR PORT REMOVAL  09/20/2019   • OOPHORECTOMY Bilateral 2017   • MI CYSTO BLADDER W/URETERAL CATHETERIZATION Right 3/30/2023    Procedure: CYSTOSCOPY RETROGRADE PYELOGRAM WITH exchange of STENT URETERAL;  Surgeon: Zechariah West MD;  [facility];  Service: Urology   • MI CYSTO W/INSERT URETERAL STENT Right 3/30/2023    Procedure: EXCHANGE STENT URETERAL;  Surgeon: Zechariah West MD;  [facility];  Service: Urology   • MI INSJ TUNNELED CTR VAD W/SUBQ PORT AGE 7 YR/> Left 11/12/2019    Procedure: INSERTION VENOUS PORT ( PORT-A-CATH) IR;  Surgeon: Gabrielle Shipman DO;  [facility];  Service: Interventional Radiology   • MI LAPS ABD PRTM&OMENTUM DX W/WO SPEC BR/WA SPX N/A 12/19/2017    Procedure: LAPAROSCOPY DIAGNOSTIC;  Surgeon: Dion Rodrigues MD;  [facility];  Service: Gynecology Oncology   • MI LAPS W/RAD HYST W/BILAT LMPHADEC RMVL TUBE/OVARY N/A 12/19/2017    Procedure: HYSTERECTOMY LAPAROSCOPIC TOTAL ([address]) W/ ROBOTICS; BILATERAL SALPINGOOPHERECTOMY; LYMPH NODE DISSECTION; lysis of adhesions;  Surgeon: Dion Rodrigues MD;  Location[facility];  Service: Gynecology Oncology   • TONSILLECTOMY     • US GUIDED BREAST BIOPSY RIGHT COMPLETE Right 06/28/2019     Social History   Social History     Substance and Sexual Activity   Alcohol Use Not Currently     Social History     Substance and Sexual Activity   Drug Use No     E-Cigarette/Vaping   • E-Cigarette Use Never User      E-Cigarette/Vaping Substances   • Nicotine No    • THC No    • CBD No    • Flavoring No    • Other No    • Unknown No      Social History     Tobacco Use   Smoking Status Never   Smokeless Tobacco Never     Family History Problem Relation Age of Onset   • Hyperlipidemia Mother    • Heart disease Mother    • Ovarian cancer Mother 48   • Colon cancer Mother    • Lymphoma Father    • Breast cancer Sister 61   • No Known Problems Brother    • No Known Problems Brother    • No Known Problems Maternal Grandmother    • Bone cancer Maternal Grandfather    • Uterine cancer Paternal Grandmother    • No Known Problems Paternal Grandfather    • No Known Problems Maternal Aunt    • No Known Problems Maternal Aunt    • No Known Problems Maternal Aunt    • No Known Problems Maternal Aunt    • No Known Problems Paternal Aunt    • No Known Problems Paternal Aunt    • No Known Problems Paternal Aunt    • No Known Problems Paternal Aunt        Meds/Allergies   all current active meds have been reviewed and current meds:   Current Facility-Administered Medications   Medication Dose Route Frequency   • acetaminophen (TYLENOL) tablet 650 mg  650 mg Oral Q4H PRN   • ARIPiprazole (ABILIFY) tablet 20 mg  20 mg Oral Daily   • aspirin (ECOTRIN LOW STRENGTH) EC tablet 81 mg  81 mg Oral QPM   • bisacodyl (DULCOLAX) rectal suppository 10 mg  10 mg Rectal Once PRN   • [START ON 5/6/2023] calcium acetate (PHOSLO) capsule 667 mg  667 mg Oral TID With Meals   • cefepime (MAXIPIME) 500 mg in sodium chloride 0 9 % 50 mL IVPB  500 mg Intravenous Q24H   • docusate sodium (COLACE) capsule 100 mg  100 mg Oral BID   • folic acid (FOLVITE) tablet 1 mg  1 mg Oral Daily   • heparin (porcine) subcutaneous injection 5,000 Units  5,000 Units Subcutaneous Q8H [facility]   • magnesium sulfate 2 g/50 mL IVPB (premix) 2 g  2 g Intravenous Once   • oxybutynin (DITROPAN-XL) 24 hr tablet 15 mg  15 mg Oral HS   • oxyCODONE (ROXICODONE) IR tablet 5 mg  5 mg Oral Q4H PRN   • oxyCODONE (ROXICODONE) split tablet 2 5 mg  2 5 mg Oral Q4H PRN   • pantoprazole (PROTONIX) EC tablet 40 mg  40 mg Oral Early Morning   • polyethylene glycol (MIRALAX) packet 17 g  17 g Oral Daily   • saccharomyces boulardii (FLORASTOR) capsule 250 mg  250 mg Oral BID   • sodium bicarbonate 150 mEq in dextrose 5 % 1,000 mL infusion  100 mL/hr Intravenous Continuous   • venlafaxine (EFFEXOR) tablet 75 mg  75 mg Oral TID       Allergies   Allergen Reactions   • Cephalosporins Rash     Which Cephalosporin reaction was to not specified; however, has tolerated Amoxicillin, Cefazolin, and Cefepime       Objective     Intake/Output Summary (Last 24 hours) at 5/5/2023 1029  Last data filed at 5/5/2023 0426  Gross per 24 hour   Intake 1050 ml   Output 1250 ml   Net -200 ml       Invasive Devices:      Physical Exam     Physical Exam  Vitals and nursing note reviewed  Constitutional:       General: She is not in acute distress  Appearance: She is well-developed and normal weight  She is not toxic-appearing  HENT:      Head: Normocephalic and atraumatic  Nose: Nose normal  No rhinorrhea  Mouth/Throat:      Mouth: Mucous membranes are moist    Eyes:      General: No scleral icterus  Extraocular Movements: Extraocular movements intact  Conjunctiva/sclera: Conjunctivae normal    Cardiovascular:      Rate and Rhythm: Normal rate and regular rhythm  Pulses: Normal pulses  Heart sounds: No murmur heard  Pulmonary:      Effort: Pulmonary effort is normal  No respiratory distress  Breath sounds: Normal breath sounds  No wheezing or rales  Abdominal:      General: There is no distension  Palpations: Abdomen is soft  There is no mass  Tenderness: There is no abdominal tenderness  There is right CVA tenderness  There is no left CVA tenderness or guarding  Genitourinary:     Comments: Urine output this morning appeared light pink with white debris and no clots  Musculoskeletal:         General: No swelling  Cervical back: Neck supple  Right lower leg: No edema  Left lower leg: No edema  Skin:     General: Skin is warm and dry        Capillary Refill: Capillary refill takes less than 2 seconds  Neurological:      Mental Status: She is alert and oriented to person, place, and time  Psychiatric:         Mood and Affect: Mood normal          Behavior: Behavior normal          Thought Content: Thought content normal               Lab Results:    CBC:   Lab Results   Component Value Date    WBC 13 02 (H) 05/05/2023    HGB 8 0 (L) 05/05/2023    HCT 26 1 (L) 05/05/2023    MCV 90 05/05/2023     05/05/2023    MCH 27 7 05/05/2023    MCHC 30 7 (L) 05/05/2023    RDW 14 2 05/05/2023    MPV 10 0 05/05/2023    NRBC 0 05/05/2023     CMP:   Lab Results   Component Value Date    SODIUM 137 05/05/2023    K 3 2 (L) 05/05/2023     05/05/2023    CO2 19 (L) 05/05/2023    BUN 78 (H) 05/05/2023    CREATININE 3 60 (H) 05/05/2023    CALCIUM 8 2 (L) 05/05/2023    AST 6 (L) 05/05/2023    ALT 5 (L) 05/05/2023    ALKPHOS 94 05/05/2023    EGFR 12 05/05/2023     Phosphorus:   Lab Results   Component Value Date    PHOS 6 8 (H) 05/05/2023     Magnesium:   Lab Results   Component Value Date    MG 1 5 (L) 05/05/2023     Urinalysis:   Lab Results   Component Value Date    COLORU Light Orange 05/04/2023    CLARITYU Extra Turbid 05/04/2023    SPECGRAV 1 006 05/04/2023    PHUR 5 0 05/04/2023    LEUKOCYTESUR Large (A) 05/04/2023    NITRITE Negative 05/04/2023    GLUCOSEU Negative 05/04/2023    KETONESU Negative 05/04/2023    BILIRUBINUR Negative 05/04/2023    BLOODU Large (A) 05/04/2023     Radiology review: Reviewed reports from ultrasound and CT abdomen pelvis  Counseling / Coordination of Care  Total floor / unit time spent today 30 minutes  Greater than 50% of total time was spent with the patient and / or family counseling and / or coordination of care  A description of the counseling / coordination of care: Discussed the importance of attending hydration even when having poor food intake to avoid MONROE

## 2023-05-05 NOTE — PLAN OF CARE
Problem: GENITOURINARY - ADULT  Goal: Maintains or returns to baseline urinary function  Description: INTERVENTIONS:  - Assess urinary function  - Encourage oral fluids to ensure adequate hydration if ordered  - Administer IV fluids as ordered to ensure adequate hydration  - Administer ordered medications as needed  - Offer frequent toileting  - Follow urinary retention protocol if ordered  5/5/2023 1624 by Ovi Wright RN  Outcome: Progressing  5/5/2023 1623 by Ovi Wright RN  Outcome: Progressing     Problem: GENITOURINARY - ADULT  Goal: Urinary catheter remains patent  Description: INTERVENTIONS:  - Assess patency of urinary catheter  - If patient has a chronic manley, consider changing catheter if non-functioning  - Follow guidelines for intermittent irrigation of non-functioning urinary catheter  5/5/2023 1624 by Ovi Wright RN  Outcome: Progressing  5/5/2023 1623 by Ovi Wright RN  Outcome: Progressing

## 2023-05-05 NOTE — ASSESSMENT & PLAN NOTE
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  - On arrival patient hemodynamically stable, blood work showing leukocytosis (13 84), mild hyponatremia (132), anion gap (17) metabolic acidosis (6 531/98 1/45 7/1 5), MONROE (Cr 4 26; baseline 0 9 - 1 1)  - UA revealing innumerable WBC, RBC and occasional bacteria  - Urine grossly bloody requiring 3 way catheter for continuous irrigation as manley was clotting   - CT abd revealing no obstructive uropathy, mild left hydronephrosis and hydrorureter (pyelonephritis not excluded), persistent circumferential bladder wall thickening possibly indicating cystitis as well as severe constipation   - Physical exam notable for ecchymosis around the lower abdomen from patients home lovenox; denies any abdominal pain, dysuria, N/V, fever, chills  - Patient complaining of ongoing decreased appetite for last week and a half, fatigue   Also reports not having a BM for > 1 week  - Patient has history of UTI's last one in March, positive cultures for pseudomonas in '20   - Suspect bleed 2/2 to     Plan  - Nephro consult  - 150 mL/hr sodium bicarb   - Cefepime 1g IV q8   - F/u urine cx   - F/u blood cx   - Continue bladder irrigation until clear  - Clear liquid diet  - Miralax QD   - Monitor for BM  - CBC  - CMP  - Phos  - Strict I/O's   - F/u APTT

## 2023-05-05 NOTE — ASSESSMENT & PLAN NOTE
- On arrival 7 206/18 5/86 7/7 2 (VBG)  - Anion gap of 17   - Serum bicarb of 11  - Suspect 2/2 to renal failure due to infection   S/p - Sodium bicarb drip and IVF  mL/hr     Plan  Monitor BMP

## 2023-05-05 NOTE — PLAN OF CARE
Problem: Potential for Falls  Goal: Patient will remain free of falls  Description: INTERVENTIONS:  - Educate patient/family on patient safety including physical limitations  - Instruct patient to call for assistance with activity   - Consult OT/PT to assist with strengthening/mobility   - Keep Call bell within reach  - Keep bed low and locked with side rails adjusted as appropriate  - Keep care items and personal belongings within reach  - Initiate and maintain comfort rounds  - Make Fall Risk Sign visible to staff  - Offer Toileting every 2 Hours, in advance of need  - Initiate/Maintain bed alarm  - Obtain necessary fall risk management equipment  - Apply yellow socks and bracelet for high fall risk patients  - Consider moving patient to room near nurses station  Outcome: Progressing     Problem: Nutrition/Hydration-ADULT  Goal: Nutrient/Hydration intake appropriate for improving, restoring or maintaining nutritional needs  Description: Monitor and assess patient's nutrition/hydration status for malnutrition  Collaborate with interdisciplinary team and initiate plan and interventions as ordered  Monitor patient's weight and dietary intake as ordered or per policy  Utilize nutrition screening tool and intervene as necessary  Determine patient's food preferences and provide high-protein, high-caloric foods as appropriate       INTERVENTIONS:  - Monitor oral intake, urinary output, labs, and treatment plans  - Assess nutrition and hydration status and recommend course of action  - Evaluate amount of meals eaten  - Assist patient with eating if necessary   - Allow adequate time for meals  - Recommend/ encourage appropriate diets, oral nutritional supplements, and vitamin/mineral supplements  - Order, calculate, and assess calorie counts as needed  - Recommend, monitor, and adjust tube feedings and TPN/PPN based on assessed needs  - Assess need for intravenous fluids  - Provide specific nutrition/hydration education as appropriate  - Include patient/family/caregiver in decisions related to nutrition  Outcome: Progressing     Problem: PAIN - ADULT  Goal: Verbalizes/displays adequate comfort level or baseline comfort level  Description: Interventions:  - Encourage patient to monitor pain and request assistance  - Assess pain using appropriate pain scale  - Administer analgesics based on type and severity of pain and evaluate response  - Implement non-pharmacological measures as appropriate and evaluate response  - Consider cultural and social influences on pain and pain management  - Notify physician/advanced practitioner if interventions unsuccessful or patient reports new pain  Outcome: Progressing     Problem: INFECTION - ADULT  Goal: Absence or prevention of progression during hospitalization  Description: INTERVENTIONS:  - Assess and monitor for signs and symptoms of infection  - Monitor lab/diagnostic results  - Monitor all insertion sites, i e  indwelling lines, tubes, and drains  - Monitor endotracheal if appropriate and nasal secretions for changes in amount and color  - [place] appropriate cooling/warming therapies per order  - Administer medications as ordered  - Instruct and encourage patient and family to use good hand hygiene technique  - Identify and instruct in appropriate isolation precautions for identified infection/condition  Outcome: Progressing     Problem: SAFETY ADULT  Goal: Patient will remain free of falls  Description: INTERVENTIONS:  - Educate patient/family on patient safety including physical limitations  - Instruct patient to call for assistance with activity   - Consult OT/PT to assist with strengthening/mobility   - Keep Call bell within reach  - Keep bed low and locked with side rails adjusted as appropriate  - Keep care items and personal belongings within reach  - Initiate and maintain comfort rounds  - Make Fall Risk Sign visible to staff  - Offer Toileting every 2 Hours, in advance of need  - Initiate/Maintain bed alarm  - Obtain necessary fall risk management equipment  - Apply yellow socks and bracelet for high fall risk patients  - Consider moving patient to room near nurses station  Outcome: Progressing     Problem: DISCHARGE PLANNING  Goal: Discharge to home or other facility with appropriate resources  Description: INTERVENTIONS:  - Identify barriers to discharge w/patient and caregiver  - Arrange for needed discharge resources and transportation as appropriate  - Identify discharge learning needs (meds, wound care, etc )  - Arrange for interpretive services to assist at discharge as needed  - Refer to Case Management Department for coordinating discharge planning if the patient needs post-hospital services based on physician/advanced practitioner order or complex needs related to functional status, cognitive ability, or social support system  Outcome: Progressing     Problem: Knowledge Deficit  Goal: Patient/family/caregiver demonstrates understanding of disease process, treatment plan, medications, and discharge instructions  Description: Complete learning assessment and assess knowledge base    Interventions:  - Provide teaching at level of understanding  - Provide teaching via preferred learning methods  Outcome: Progressing     Problem: GENITOURINARY - ADULT  Goal: Maintains or returns to baseline urinary function  Description: INTERVENTIONS:  - Assess urinary function  - Encourage oral fluids to ensure adequate hydration if ordered  - Administer IV fluids as ordered to ensure adequate hydration  - Administer ordered medications as needed  - Offer frequent toileting  - Follow urinary retention protocol if ordered  Outcome: Progressing  Goal: Absence of urinary retention  Description: INTERVENTIONS:  - Assess patient’s ability to void and empty bladder  - Monitor I/O  - Bladder scan as needed  - Discuss with physician/AP medications to alleviate retention as needed  - Discuss catheterization for long term situations as appropriate  Outcome: Progressing  Goal: Urinary catheter remains patent  Description: INTERVENTIONS:  - Assess patency of urinary catheter  - If patient has a chronic manley, consider changing catheter if non-functioning  - Follow guidelines for intermittent irrigation of non-functioning urinary catheter  Outcome: Progressing     Problem: METABOLIC, FLUID AND ELECTROLYTES - ADULT  Goal: Electrolytes maintained within normal limits  Description: INTERVENTIONS:  - Monitor labs and assess patient for signs and symptoms of electrolyte imbalances  - Administer electrolyte replacement as ordered  - Monitor response to electrolyte replacements, including repeat lab results as appropriate  - Instruct patient on fluid and nutrition as appropriate  Outcome: Progressing  Goal: Fluid balance maintained  Description: INTERVENTIONS:  - Monitor labs   - Monitor I/O and WT  - Instruct patient on fluid and nutrition as appropriate  - Assess for signs & symptoms of volume excess or deficit  Outcome: Progressing     Problem: SKIN/TISSUE INTEGRITY - ADULT  Goal: Skin Integrity remains intact(Skin Breakdown Prevention)  Description: Assess:  -Perform Ariel assessment every shift   -Clean and moisturize skin daily and as needed for soilage  -Inspect skin when repositioning, toileting, and assisting with ADLS  -Assess under medical devices   -Assess extremities for adequate circulation and sensation     Bed Management:  -Have minimal linens on bed & keep smooth, unwrinkled  -Change linens as needed when moist or perspiring  -Avoid sitting or lying in one position for more than 2 hours while in bed  -Keep HOB at 30 degrees     Toileting:  -Offer bedside commode  -Assess for incontinence every 2 hrs   -Use incontinent care products after each incontinent episode    Activity:  -Mobilize patient 3 times a day  -Encourage activity and walks on unit  -Encourage or provide ROM exercises   -Turn and reposition patient every 2 Hours  -Use appropriate equipment to lift or move patient in bed  -Instruct/ Assist with weight shifting  when out of bed in chair  Skin Care:  -Avoid use of baby powder, tape, friction and shearing, hot water or constrictive clothing  -Relieve pressure over bony prominences using foam dressings   -Do not massage red bony areas    Next Steps:  -Teach patient strategies to minimize risks such as frequent repositioning    Outcome: Progressing  Goal: Incision(s), wounds(s) or drain site(s) healing without S/S of infection  Description: INTERVENTIONS  - Assess and document dressing, incision, wound bed, drain sites and surrounding tissue  - Provide patient and family education  - Perform skin care/dressing changes every shift  Outcome: Progressing  Goal: Pressure injury heals and does not worsen  Description: Interventions:  - Implement low air loss mattress or specialty surface (Criteria met)  - Apply silicone foam dressing  - Instruct/assist with weight shifting when in chair   - Use special pressure reducing interventions such as waffle cushion when in chair   - Apply fecal or urinary incontinence containment device   - Perform passive or active ROM every shift   - Turn and reposition patient & offload bony prominences every 2 hours   - Utilize friction reducing device or surface for transfers   - Use incontinent care products after each incontinent episode   - Consider nutrition services referral as needed  Outcome: Progressing

## 2023-05-05 NOTE — ASSESSMENT & PLAN NOTE
- On arrival 7 206/18 5/86 7/7 2 (VBG)  - Anion gap of 17   - Serum bicarb of 11  - Suspect 2/2 to renal failure due to infection     Plan  - Sodium bicarb drip until correction in pH and serum bicarb normalized to avoid rebound acidosis THEN  - IVF  mL/hr   - Repeat VBG  - F/u BMP  - Replete electrolytes ----- Message from Whit Guerrero sent at 8/28/2020  7:26 AM EDT -----  Regarding: PAP and Mammo  08/28/20 7:26 AM    Hello, our patient Eric Denny has had Mammogram and Pap Smear (HPV) aka Cervical Cancer Screening completed/performed  Please assist in updating the patient chart by pulling the Care Everywhere (CE) document  The date of service is mammo 8/12/2020 and pap 5/29/2019       Thank you,  Whit Guerrero  [facility]

## 2023-05-05 NOTE — ASSESSMENT & PLAN NOTE
- Phosphate of 8 4 on admission  - In setting of acute on chronic disease     Plan  - Start PhosLo  - recheck phosphate

## 2023-05-05 NOTE — ASSESSMENT & PLAN NOTE
- Sever constipation noted on CT abdomen  - Patient reports no BM in over a week  - Patient without any abdominal pain/tenderness or distention     Plan-  Resume regular diet  Bisacodyl suppository   If no resolution will order enema

## 2023-05-05 NOTE — MALNUTRITION/BMI
This medical record reflects one or more clinical indicators suggestive of malnutrition  Malnutrition Findings:   Adult Malnutrition type: Chronic illness  Adult Degree of Malnutrition: Other severe protein calorie malnutrition  Malnutrition Characteristics: Muscle loss, Fat loss                  360 Statement: inadequate intake related to poor appetite as evidenced by dark sunken orbital, wasted temporalis, buccal pads, hollow supraclavicular space, wasted interosseous  Int: Advance diet to regular, as medically indicated,  medical food supplements  BMI Findings: Body mass index is 23 43 kg/m²  See Nutrition note dated 5/5/2023 for additional details  Completed nutrition assessment is viewable in the nutrition documentation

## 2023-05-05 NOTE — QUICK NOTE
Patient's CBI clamped; no gross hematuria present    Labs reviewed    Plan:   D/c Sodium Bicarb and started on IVF  mL/hr for 6 hours   Follow up repeat BMP

## 2023-05-05 NOTE — ASSESSMENT & PLAN NOTE
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  - On arrival patient hemodynamically stable, blood work showing leukocytosis (13 84), mild hyponatremia (132), anion gap (17) metabolic acidosis (2 073/66 1/10 1/5 3), MONROE (Cr 4 26; baseline 0 9 - 1 1)  - UA revealing innumerable WBC, RBC and occasional bacteria  - Urine grossly bloody requiring 3 way catheter for continuous irrigation as manley was clotting   - CT abd revealing no obstructive uropathy, mild left hydronephrosis and hydrorureter (pyelonephritis not excluded), persistent circumferential bladder wall thickening possibly indicating cystitis as well as severe constipation   - Physical exam notable for ecchymosis around the lower abdomen from patients home lovenox; denies any abdominal pain, dysuria, N/V, fever, chills  - Patient complaining of ongoing decreased appetite for last week and a half, fatigue   Also reports not having a BM for > 1 week  - Patient has history of UTI's last one in March, positive cultures for pseudomonas in '20   - Suspect bleed 2/2 to cystitis causing MONROE possibly resulting in super therapeutic dose of lovenox   No longer on CBI  Completed sodium bicarb, IVF  cc for 6 hours    Plan  Appreciate nephrology recommendations  Continue cefepime  UA culture-Staph aureus, awaiting sensitivities   Voiding trial today, remove Manley  Urinary retention protocol  Consider urology consult if patient fails voiding trial

## 2023-05-05 NOTE — ASSESSMENT & PLAN NOTE
-  Baseline Cr: 0 9-1 1  - Prior CDK3a now 5   - Suspect 2/2 to UTI     Plan  - Continue IVF   - Avoid nephrotoxic drugs  - Avoid NSAIDs   - Avoid contrast mediated studies   - Maintain MAP >65

## 2023-05-05 NOTE — ASSESSMENT & PLAN NOTE
- Patient with DVT in 2017  - Currently on Lovenox 80 mg daily  VAS US-negative    Plan  - Hold lovenox in setting of MONROE  -Continue heparin DVT prophylaxis

## 2023-05-05 NOTE — QUICK NOTE
Pt said that her home med subcutaneous lovenox  80 mg daily is prescribed by her gyne oncologist Dr Caballero July  Pt denied to call to her family today  They know that pt is here and receiving treatment

## 2023-05-05 NOTE — ASSESSMENT & PLAN NOTE
- Diagnosis '14; s/p total abdominal hysterectomy and bilateral salpingo oophorectomy   - Status post ENDOBARR (rucaparib, atezolizumab, and bevacizumab) clinical trial started in 12/2020 and finished 12/2022  - She is currently under active surveillance

## 2023-05-05 NOTE — QUICK NOTE
Patient was admitted early last night for hemorrhagic cystitis with pyelonephritis in the setting of MONROE and metabolic acidosis  Hemodynamically stable  CT abdomen showed left-sided left hydronephrosis and hydroureter and circumferential bladder wall thickening indicating cystitis  Patient had 1 bowel movement this morning after receiving a dose of rectal suppository  Plans: Follow-up nephrology consult  Nephrology recommendations appreciated  Continue bicarb drip until correction and IV fluid normal saline 100 ml/ hour  Continue cefepime  Recheck VBG and BMP at 3 PM  Home med Lovenox is on hold in setting of MONROE  Heparin DVT prophylaxis  Continue bowel regimen    Clear liquid diet  Check CBI

## 2023-05-05 NOTE — ED NOTES
Manual irrigation of urinary catheter completed  Provider at bedside       Britt Oates RN  05/04/23 2147

## 2023-05-05 NOTE — ASSESSMENT & PLAN NOTE
-  Baseline Cr: 0 9-1 1  - Prior CDK3a now 5   - Suspect 2/2 to UTI  Estimated Creatinine Clearance: 10 5 mL/min (A) (by C-G formula based on SCr of 4 1 mg/dL (H))    Cr-3 17    Plan  Avoid nephrotoxic drugs  Avoid hypotension  Monitor BMP

## 2023-05-06 PROBLEM — E87.1 HYPONATREMIA: Status: RESOLVED | Noted: 2019-09-19 | Resolved: 2023-05-06

## 2023-05-06 PROBLEM — E87.29 METABOLIC ACIDOSIS, INCREASED ANION GAP: Status: RESOLVED | Noted: 2023-05-04 | Resolved: 2023-05-06

## 2023-05-06 LAB
ALBUMIN SERPL BCP-MCNC: 2.9 G/DL (ref 3.5–5)
ALP SERPL-CCNC: 90 U/L (ref 34–104)
ALT SERPL W P-5'-P-CCNC: 5 U/L (ref 7–52)
ANION GAP SERPL CALCULATED.3IONS-SCNC: 10 MMOL/L (ref 4–13)
ANION GAP SERPL CALCULATED.3IONS-SCNC: 11 MMOL/L (ref 4–13)
AST SERPL W P-5'-P-CCNC: 6 U/L (ref 13–39)
BILIRUB SERPL-MCNC: 0.29 MG/DL (ref 0.2–1)
BUN SERPL-MCNC: 65 MG/DL (ref 5–25)
BUN SERPL-MCNC: 69 MG/DL (ref 5–25)
CALCIUM ALBUM COR SERPL-MCNC: 9.4 MG/DL (ref 8.3–10.1)
CALCIUM SERPL-MCNC: 8.5 MG/DL (ref 8.4–10.2)
CALCIUM SERPL-MCNC: 8.5 MG/DL (ref 8.4–10.2)
CHLORIDE SERPL-SCNC: 105 MMOL/L (ref 96–108)
CHLORIDE SERPL-SCNC: 108 MMOL/L (ref 96–108)
CO2 SERPL-SCNC: 21 MMOL/L (ref 21–32)
CO2 SERPL-SCNC: 21 MMOL/L (ref 21–32)
CREAT SERPL-MCNC: 3.15 MG/DL (ref 0.6–1.3)
CREAT SERPL-MCNC: 3.17 MG/DL (ref 0.6–1.3)
ERYTHROCYTE [DISTWIDTH] IN BLOOD BY AUTOMATED COUNT: 14.5 % (ref 11.6–15.1)
GFR SERPL CREATININE-BSD FRML MDRD: 14 ML/MIN/1.73SQ M
GFR SERPL CREATININE-BSD FRML MDRD: 14 ML/MIN/1.73SQ M
GLUCOSE SERPL-MCNC: 85 MG/DL (ref 65–140)
GLUCOSE SERPL-MCNC: 94 MG/DL (ref 65–140)
HCT VFR BLD AUTO: 25.6 % (ref 34.8–46.1)
HGB BLD-MCNC: 7.9 G/DL (ref 11.5–15.4)
MAGNESIUM SERPL-MCNC: 2.2 MG/DL (ref 1.9–2.7)
MCH RBC QN AUTO: 28.5 PG (ref 26.8–34.3)
MCHC RBC AUTO-ENTMCNC: 30.9 G/DL (ref 31.4–37.4)
MCV RBC AUTO: 92 FL (ref 82–98)
PHOSPHATE SERPL-MCNC: 5.2 MG/DL (ref 2.3–4.1)
PLATELET # BLD AUTO: 323 THOUSANDS/UL (ref 149–390)
PMV BLD AUTO: 10.4 FL (ref 8.9–12.7)
POTASSIUM SERPL-SCNC: 3.9 MMOL/L (ref 3.5–5.3)
POTASSIUM SERPL-SCNC: 4.1 MMOL/L (ref 3.5–5.3)
PROT SERPL-MCNC: 7.1 G/DL (ref 6.4–8.4)
RBC # BLD AUTO: 2.77 MILLION/UL (ref 3.81–5.12)
SODIUM SERPL-SCNC: 137 MMOL/L (ref 135–147)
SODIUM SERPL-SCNC: 139 MMOL/L (ref 135–147)
WBC # BLD AUTO: 9.82 THOUSAND/UL (ref 4.31–10.16)

## 2023-05-06 RX ORDER — BISACODYL 10 MG
10 SUPPOSITORY, RECTAL RECTAL ONCE
Status: COMPLETED | OUTPATIENT
Start: 2023-05-06 | End: 2023-05-06

## 2023-05-06 RX ORDER — SODIUM CHLORIDE, SODIUM GLUCONATE, SODIUM ACETATE, POTASSIUM CHLORIDE, MAGNESIUM CHLORIDE, SODIUM PHOSPHATE, DIBASIC, AND POTASSIUM PHOSPHATE .53; .5; .37; .037; .03; .012; .00082 G/100ML; G/100ML; G/100ML; G/100ML; G/100ML; G/100ML; G/100ML
100 INJECTION, SOLUTION INTRAVENOUS CONTINUOUS
Status: DISPENSED | OUTPATIENT
Start: 2023-05-06 | End: 2023-05-07

## 2023-05-06 RX ORDER — CALCIUM ACETATE 667 MG/1
667 CAPSULE ORAL
Status: DISCONTINUED | OUTPATIENT
Start: 2023-05-07 | End: 2023-05-13 | Stop reason: HOSPADM

## 2023-05-06 RX ADMIN — VENLAFAXINE 75 MG: 37.5 TABLET ORAL at 21:47

## 2023-05-06 RX ADMIN — POLYETHYLENE GLYCOL 3350 17 G: 17 POWDER, FOR SOLUTION ORAL at 08:15

## 2023-05-06 RX ADMIN — FOLIC ACID 1 MG: 1 TABLET ORAL at 08:15

## 2023-05-06 RX ADMIN — PANTOPRAZOLE SODIUM 40 MG: 40 TABLET, DELAYED RELEASE ORAL at 05:32

## 2023-05-06 RX ADMIN — OXYBUTYNIN CHLORIDE 15 MG: 5 TABLET, EXTENDED RELEASE ORAL at 21:47

## 2023-05-06 RX ADMIN — HEPARIN SODIUM 5000 UNITS: 5000 INJECTION INTRAVENOUS; SUBCUTANEOUS at 21:47

## 2023-05-06 RX ADMIN — DOCUSATE SODIUM 100 MG: 100 CAPSULE, LIQUID FILLED ORAL at 18:12

## 2023-05-06 RX ADMIN — ARIPIPRAZOLE 20 MG: 5 TABLET ORAL at 08:15

## 2023-05-06 RX ADMIN — VENLAFAXINE 75 MG: 37.5 TABLET ORAL at 18:12

## 2023-05-06 RX ADMIN — OXYCODONE HYDROCHLORIDE 5 MG: 5 TABLET ORAL at 18:36

## 2023-05-06 RX ADMIN — HEPARIN SODIUM 5000 UNITS: 5000 INJECTION INTRAVENOUS; SUBCUTANEOUS at 14:11

## 2023-05-06 RX ADMIN — ASPIRIN 81 MG: 81 TABLET, COATED ORAL at 18:12

## 2023-05-06 RX ADMIN — SODIUM CHLORIDE, SODIUM GLUCONATE, SODIUM ACETATE, POTASSIUM CHLORIDE, MAGNESIUM CHLORIDE, SODIUM PHOSPHATE, DIBASIC, AND POTASSIUM PHOSPHATE 100 ML/HR: .53; .5; .37; .037; .03; .012; .00082 INJECTION, SOLUTION INTRAVENOUS at 14:15

## 2023-05-06 RX ADMIN — OXYCODONE HYDROCHLORIDE 5 MG: 5 TABLET ORAL at 14:11

## 2023-05-06 RX ADMIN — OXYCODONE HYDROCHLORIDE 5 MG: 5 TABLET ORAL at 09:37

## 2023-05-06 RX ADMIN — CEFEPIME 1000 MG: 1 INJECTION, POWDER, FOR SOLUTION INTRAMUSCULAR; INTRAVENOUS at 21:55

## 2023-05-06 RX ADMIN — Medication 250 MG: at 18:12

## 2023-05-06 RX ADMIN — HEPARIN SODIUM 5000 UNITS: 5000 INJECTION INTRAVENOUS; SUBCUTANEOUS at 05:32

## 2023-05-06 RX ADMIN — OXYCODONE HYDROCHLORIDE 5 MG: 5 TABLET ORAL at 07:53

## 2023-05-06 RX ADMIN — DOCUSATE SODIUM 100 MG: 100 CAPSULE, LIQUID FILLED ORAL at 08:15

## 2023-05-06 RX ADMIN — VENLAFAXINE 75 MG: 37.5 TABLET ORAL at 08:15

## 2023-05-06 RX ADMIN — Medication 250 MG: at 08:15

## 2023-05-06 RX ADMIN — BISACODYL 10 MG: 10 SUPPOSITORY RECTAL at 09:37

## 2023-05-06 NOTE — ASSESSMENT & PLAN NOTE
Malnutrition Findings:   Adult Malnutrition type: Chronic illness  Adult Degree of Malnutrition: Other severe protein calorie malnutrition  Malnutrition Characteristics: Muscle loss, Fat loss                  360 Statement: inadequate intake related to poor appetite as evidenced by dark sunken orbital, wasted temporalis, buccal pads, hollow supraclavicular space, wasted interosseous  Int: Advance diet to regular, as medically indicated,  medical food supplements  BMI Findings: Body mass index is 23 43 kg/m²

## 2023-05-06 NOTE — ASSESSMENT & PLAN NOTE
-  Baseline Cr: 0 9-1 1  - Prior CDK3a now 5   - Suspect 2/2 to UTI  Estimated Creatinine Clearance: 13 7 mL/min (A) (by C-G formula based on SCr of 3 15 mg/dL (H))    Cr-3 17    Plan  Avoid nephrotoxic drugs  Avoid hypotension  Monitor BMP

## 2023-05-06 NOTE — PROGRESS NOTES
[address] [facility] NOTE   Ragini Melendez 74 y o  female MRN: [id number]  Unit/Bed#: S -01 Encounter: [id number]  Reason for Consult: MONROE on CKD    ASSESSMENT and PLAN:    74-year-old female with a past medical history of CKD, nephrotic syndrome, endometrial carcinoma with recent VEGF with history of DVT, gastric bypass, hypertension, who was sent in by nephrologist on 5/4 due to acute kidney injury and severe acidosis     1-acute kidney injury on chronic kidney injury stage III-  - Outpatient nephrologist Dr Charlie Rick  - Baseline creatinine 0 9 to 1 1 mg/dL but rising to 1 6 mg/dL in March  - Admission creatinine-  - Renal imaging-renal ultrasound 5/4 with nephroureteral stent in place, mild bilateral hydro with more prominence on the left, left kidney renal parenchyma diffusely echogenic  - CT scan-5/4-right ureteral stent in expected position with resolution of subcapsular collection with no obstructive uropathy  Mild left-sided hydro and hydroureter but no calculus noted  Pyelonephritis not excluded  Circumferential bladder wall thickening   - Urinalysis-innumerable WBC, normal RBC, moderate bacteria most recent stent exchange was in March 2022  - Was noted to have gross hematuria and was started on three-way Sifuentes catheter for irrigation  - Initially started on isotonic bicarbonate fluids and broad-spectrum antibiotics with improvement in renal function and transition to isotonic fluids on 5/5  - 5/6-creatinine improving 3 2 mg/dL  Changed fluids to pH balance fluids       Overall, patient is clinically improving with volume expansion  Only has Staph aureus in the urine thus far which is known from prior  Therefore if the patient did have a true urinary tract infection, and given hardware, may not have improved as rapidly as the patient was only receiving cefepime  Therefore this may be more volume depletion as the cause of acute kidney injury and may have element of obstructive issue    I have reviewed directly with primary team resident      Plan  - I/os; avoid nephrotoxic agents  - Avoid hypotension  - Continue intravenous fluids today  - Follow-up cryoglobulin, anti-PLA2R antibody, SPEP, free light chain  - BMP in a m   - Follow-up final cultures  - check renal u/s in 48 hours - Sunday  - Void trial in progress by primary team  - Consider Urology eval especially if hydro not improving  - If true urinary tract infection (not currently convincing), then will need to consider urology evaluation as the patient has hardware with ureteral stent  I reviewed this directly with primary team resident over the phone who is in agreement renal plan above     2-nephrotic range proteinuria history     - Last Avastin dose on September 9, 2022  - Recently with proteinuria as high as 17 g/g  - Recent 24-hour urine protein does not appear accurate-rather incomplete collection  - CHERI slightly positive  - Anti-double-stranded DNA negative  - Complements unrevealing  - PLA2R -  - SPEP-no monoclonal band  - Kappa/lambda ratio unrevealing  - Rheumatoid factor-  - Cryoglobulin-  - Repeat SPEP-  - Repeat free light chain ratio-  - Has not tolerated lisinopril in the past due to hypotension  - Given obstructive issues with right kidney, patient was felt to be high risk for renal biopsy  - Patient was planned to have a nuclear scan to evaluate split function test as outpatient  - Etiology felt to be in the setting of VEGF inhibitor     3-right-sided hydronephrosis secondary to endometrial carcinoma - which appears improved on CT scan currently and now L hydro  Started on CBI for hematuria initially  Started on broad spectrum antibiotics for cystitis currently?     - History of prior right nephrostomy tube converted to nephroureteral stent  - concern for cystitis  - f/u final cultures  - etiology of L hydro is unclear thus far       4-acid/base - improving bicarb  With bicarbonate based fluids    Changed to pH balance fluids     5-electrolytes     - Hyperphosphatemia- improving slowly     9-chvvkm-opsronu with oncology team   No KENNEY due to malignancy  Per primary team     7-history of DVT-agree with avoiding Lovenox given current MONROE     8-constipation-per primary team     9-hypertension-initially hypotensive improved with volume expansion       SUBJECTIVE / 24H INTERVAL HISTORY:    Still hypotensive at times  Asymptomatic  Patient states he is feeling improved today  Less weak      OBJECTIVE:  Current Weight:    Vitals:    05/05/23 1622 05/05/23 2345 05/06/23 0751 05/06/23 0913   BP: 109/62 106/63 (!) 88/56 102/62   BP Location:   Left arm Left arm   Pulse: 75 72 70    Resp: 16  16    Temp: 97 6 °F (36 4 °C) 98 1 °F (36 7 °C) 97 8 °F (36 6 °C)    TempSrc:   Oral    SpO2: 98% 97% 99%    Height:           Intake/Output Summary (Last 24 hours) at 5/6/2023 1253  Last data filed at 5/6/2023 5668  Gross per 24 hour   Intake 120 ml   Output 1650 ml   Net -1530 ml     General: NAD  Skin: no rash  Eyes: anicteric sclera  ENT: moist mucous membrane  Neck: supple  Chest: CTA b/l, no ronchii, no wheeze, no rubs, no rales  CVS: s1s2, no murmur, no gallop, no rub  Abdomen: soft, nontender, nl sounds  Extremities: no edema LE b/l  : no manley  Neuro: AAOX3  Psych: normal affect      Medications:    Current Facility-Administered Medications:   •  acetaminophen (TYLENOL) tablet 650 mg, 650 mg, Oral, Q4H PRN, Chelsea Manning, DO  •  ARIPiprazole (ABILIFY) tablet 20 mg, 20 mg, Oral, Daily, Chelsea Manning, DO, 20 mg at 05/06/23 0815  •  aspirin (ECOTRIN LOW STRENGTH) EC tablet 81 mg, 81 mg, Oral, QPM, Chelsea Manning, DO, 81 mg at 05/05/23 1714  •  bisacodyl (DULCOLAX) rectal suppository 10 mg, 10 mg, Rectal, Once PRN, May Connie Franco MD  •  cefepime (MAXIPIME) 1,000 mg in dextrose 5 % 50 mL IVPB, 1,000 mg, Intravenous, Q24H, Donna Franco MD, Last Rate: 100 mL/hr at 05/05/23 2224, 1,000 mg at 05/05/23 2224  • docusate sodium (COLACE) capsule 100 mg, 100 mg, Oral, BID, May Cesaru Carlin Bowden MD, 100 mg at 50/68/46 5168  •  folic acid (FOLVITE) tablet 1 mg, 1 mg, Oral, Daily, Mclaughlin Love Nigel, DO, 1 mg at 05/06/23 0815  •  heparin (porcine) subcutaneous injection 5,000 Units, 5,000 Units, Subcutaneous, Q8H [facility], 5,000 Units at 05/06/23 0532 **AND** [COMPLETED] Platelet count, , , Once, [employer], DO  •  oxybutynin (DITROPAN-XL) 24 hr tablet 15 mg, 15 mg, Oral, HS, Mclaughlin Love Nigel, DO, 15 mg at 05/05/23 2223  •  oxyCODONE (ROXICODONE) IR tablet 5 mg, 5 mg, Oral, Q4H PRN, Mclaughlin Love Nigel, DO, 5 mg at 05/06/23 4634  •  oxyCODONE (ROXICODONE) split tablet 2 5 mg, 2 5 mg, Oral, Q4H PRN, Mclaughlin Love Nigel, DO  •  pantoprazole (PROTONIX) EC tablet 40 mg, 40 mg, Oral, Early Morning, Mclaughlin Love Nigel, DO, 40 mg at 05/06/23 0532  •  polyethylene glycol (MIRALAX) packet 17 g, 17 g, Oral, Daily, Mclaughlin Love Nigel, DO, 17 g at 05/06/23 0815  •  saccharomyces boulardii (FLORASTOR) capsule 250 mg, 250 mg, Oral, BID, Mclaughlin Love Nigel, DO, 250 mg at 05/06/23 0815  •  venlafaxine [facility]) tablet 75 mg, 75 mg, Oral, TID, Mclaughlin Love Nigel, DO, 75 mg at 05/06/23 0815    Laboratory Results:  Results from last 7 days   Lab Units 05/06/23  0535 05/06/23  0034 05/05/23  1656 05/05/23  0537 05/05/23  0024 05/04/23  2157 05/04/23  1357   WBC Thousand/uL 9 82  --   --  13 02*  --   --  13 84*   HEMOGLOBIN g/dL 7 9*  --   --  8 0*  --   --  9 4*   HEMATOCRIT % 25 6*  --   --  26 1*  --   --  31 4*   PLATELETS Thousands/uL 323  --   --  328 292  --  393*   POTASSIUM mmol/L 4 1 3 9 4 0 3 2*  --  4 6 4 6   CHLORIDE mmol/L 108 105 102 104  --  109* 103   CO2 mmol/L 21 21 22 19*  --  11* 12*   BUN mg/dL 65* 69* 71* 78*  --  87* 89*   CREATININE mg/dL 3 15* 3 17* 3 48* 3 60*  --  4 10* 4 26*   CALCIUM mg/dL 8 5 8 5 8 3* 8 2*  --  8 7 9 2   MAGNESIUM mg/dL 2 2  --  2 4 1 5*  -- --   --    PHOSPHORUS mg/dL 5 2*  --  5 2* 6 8*  --   --  8 4*

## 2023-05-06 NOTE — ASSESSMENT & PLAN NOTE
- Phosphate of 8 4 on admission  - In setting of acute on chronic disease     Plan  -Continue PhosLo   - recheck phosphate

## 2023-05-06 NOTE — PLAN OF CARE
Problem: Potential for Falls  Goal: Patient will remain free of falls  Description: INTERVENTIONS:  - Educate patient/family on patient safety including physical limitations  - Instruct patient to call for assistance with activity   - Consult OT/PT to assist with strengthening/mobility   - Keep Call bell within reach  - Keep bed low and locked with side rails adjusted as appropriate  - Keep care items and personal belongings within reach  - Initiate and maintain comfort rounds  - Make Fall Risk Sign visible to staff  - Offer Toileting every 2 Hours, in advance of need  - Initiate/Maintain bed alarm  - Obtain necessary fall risk management equipment  - Apply yellow socks and bracelet for high fall risk patients  - Consider moving patient to room near nurses station  Outcome: Progressing     Problem: Nutrition/Hydration-ADULT  Goal: Nutrient/Hydration intake appropriate for improving, restoring or maintaining nutritional needs  Description: Monitor and assess patient's nutrition/hydration status for malnutrition  Collaborate with interdisciplinary team and initiate plan and interventions as ordered  Monitor patient's weight and dietary intake as ordered or per policy  Utilize nutrition screening tool and intervene as necessary  Determine patient's food preferences and provide high-protein, high-caloric foods as appropriate       INTERVENTIONS:  - Monitor oral intake, urinary output, labs, and treatment plans  - Assess nutrition and hydration status and recommend course of action  - Evaluate amount of meals eaten  - Assist patient with eating if necessary   - Allow adequate time for meals  - Recommend/ encourage appropriate diets, oral nutritional supplements, and vitamin/mineral supplements  - Order, calculate, and assess calorie counts as needed  - Assess need for intravenous fluids  - Provide nutrition/hydration education as appropriate  - Include patient/family/caregiver in decisions related to nutrition  Outcome: Progressing     Problem: PAIN - ADULT  Goal: Verbalizes/displays adequate comfort level or baseline comfort level  Description: Interventions:  - Encourage patient to monitor pain and request assistance  - Assess pain using appropriate pain scale  - Administer analgesics based on type and severity of pain and evaluate response  - Implement non-pharmacological measures as appropriate and evaluate response  - Consider cultural and social influences on pain and pain management  - Notify physician/advanced practitioner if interventions unsuccessful or patient reports new pain  Outcome: Progressing     Problem: INFECTION - ADULT  Goal: Absence or prevention of progression during hospitalization  Description: INTERVENTIONS:  - Assess and monitor for signs and symptoms of infection  - Monitor lab/diagnostic results  - Monitor all insertion sites, i e  indwelling lines, tubes, and drains  - Monitor endotracheal if appropriate and nasal secretions for changes in amount and color  - [place] appropriate cooling/warming therapies per order  - Administer medications as ordered  - Instruct and encourage patient and family to use good hand hygiene technique  - Identify and instruct in appropriate isolation precautions for identified infection/condition  Outcome: Progressing     Problem: SAFETY ADULT  Goal: Patient will remain free of falls  Description: INTERVENTIONS:  - Educate patient/family on patient safety including physical limitations  - Instruct patient to call for assistance with activity   - Consult OT/PT to assist with strengthening/mobility   - Keep Call bell within reach  - Keep bed low and locked with side rails adjusted as appropriate  - Keep care items and personal belongings within reach  - Initiate and maintain comfort rounds  - Make Fall Risk Sign visible to staff  - Offer Toileting every 2 Hours, in advance of need  - Initiate/Maintain bed alarm  - Obtain necessary fall risk management equipment  - Apply yellow socks and bracelet for high fall risk patients  - Consider moving patient to room near nurses station  Outcome: Progressing     Problem: DISCHARGE PLANNING  Goal: Discharge to home or other facility with appropriate resources  Description: INTERVENTIONS:  - Identify barriers to discharge w/patient and caregiver  - Arrange for needed discharge resources and transportation as appropriate  - Identify discharge learning needs (meds, wound care, etc )  - Arrange for interpretive services to assist at discharge as needed  - Refer to Case Management Department for coordinating discharge planning if the patient needs post-hospital services based on physician/advanced practitioner order or complex needs related to functional status, cognitive ability, or social support system  Outcome: Progressing     Problem: Knowledge Deficit  Goal: Patient/family/caregiver demonstrates understanding of disease process, treatment plan, medications, and discharge instructions  Description: Complete learning assessment and assess knowledge base    Interventions:  - Provide teaching at level of understanding  - Provide teaching via preferred learning methods  Outcome: Progressing     Problem: GENITOURINARY - ADULT  Goal: Maintains or returns to baseline urinary function  Description: INTERVENTIONS:  - Assess urinary function  - Encourage oral fluids to ensure adequate hydration if ordered  - Administer IV fluids as ordered to ensure adequate hydration  - Administer ordered medications as needed  - Offer frequent toileting  - Follow urinary retention protocol if ordered  Outcome: Progressing  Goal: Absence of urinary retention  Description: INTERVENTIONS:  - Assess patient’s ability to void and empty bladder  - Monitor I/O  - Bladder scan as needed  - Discuss with physician/AP medications to alleviate retention as needed  - Discuss catheterization for long term situations as appropriate  Outcome: Progressing  Goal: Urinary catheter remains patent  Description: INTERVENTIONS:  - Assess patency of urinary catheter  - If patient has a chronic manley, consider changing catheter if non-functioning  - Follow guidelines for intermittent irrigation of non-functioning urinary catheter  Outcome: Progressing     Problem: METABOLIC, FLUID AND ELECTROLYTES - ADULT  Goal: Electrolytes maintained within normal limits  Description: INTERVENTIONS:  - Monitor labs and assess patient for signs and symptoms of electrolyte imbalances  - Administer electrolyte replacement as ordered  - Monitor response to electrolyte replacements, including repeat lab results as appropriate  - Instruct patient on fluid and nutrition as appropriate  Outcome: Progressing  Goal: Fluid balance maintained  Description: INTERVENTIONS:  - Monitor labs   - Monitor I/O and WT  - Instruct patient on fluid and nutrition as appropriate  - Assess for signs & symptoms of volume excess or deficit  Outcome: Progressing     Problem: SKIN/TISSUE INTEGRITY - ADULT  Goal: Skin Integrity remains intact(Skin Breakdown Prevention)  Description: Assess:  -Perform Ariel assessment every   -Clean and moisturize skin every   -Inspect skin when repositioning, toileting, and assisting with ADLS  -Assess under medical devices such as  every   -Assess extremities for adequate circulation and sensation     Bed Management:  -Have minimal linens on bed & keep smooth, unwrinkled  -Change linens as needed when moist or perspiring  -Avoid sitting or lying in one position for more than  hours while in bed  -Keep HOB at degrees     Toileting:  -Offer bedside commode  -Assess for incontinence every   -Use incontinent care products after each incontinent episode such as     Activity:  -Mobilize patient  times a day  -Encourage activity and walks on unit  -Encourage or provide ROM exercises   -Turn and reposition patient every  Hours  -Use appropriate equipment to lift or move patient in bed  -Instruct/ Assist with weight shifting every  when out of bed in chair  -Consider limitation of chair time  hour intervals    Skin Care:  -Avoid use of baby powder, tape, friction and shearing, hot water or constrictive clothing  -Relieve pressure over bony prominences using   -Do not massage red bony areas    Next Steps:  -Teach patient strategies to minimize risks such as    -Consider consults to  interdisciplinary teams such as   Outcome: Progressing  Goal: Incision(s), wounds(s) or drain site(s) healing without S/S of infection  Description: INTERVENTIONS  - Assess and document dressing, incision, wound bed, drain sites and surrounding tissue  - Provide patient and family education  - Perform skin care/dressing changes every   Outcome: Progressing  Goal: Pressure injury heals and does not worsen  Description: Interventions:  - Implement low air loss mattress or specialty surface (Criteria met)  - Apply silicone foam dressing  - Instruct/assist with weight shifting every  minutes when in chair   - Limit chair time to  hour intervals  - Use special pressure reducing interventions such as  when in chair   - Apply fecal or urinary incontinence containment device   - Perform passive or active ROM every   - Turn and reposition patient & offload bony prominences every  hours   - Utilize friction reducing device or surface for transfers   - Consider consults to  interdisciplinary teams such as   - Use incontinent care products after each incontinent episode such as   - Consider nutrition services referral as needed  Outcome: Progressing     Problem: MOBILITY - ADULT  Goal: Maintain or return to baseline ADL function  Description: INTERVENTIONS:  -  Assess patient's ability to carry out ADLs; assess patient's baseline for ADL function and identify physical deficits which impact ability to perform ADLs (bathing, care of mouth/teeth, toileting, grooming, dressing, etc )  - Assess/evaluate cause of self-care deficits   - Assess range of motion  - Assess patient's mobility; develop plan if impaired  - Assess patient's need for assistive devices and provide as appropriate  - Encourage maximum independence but intervene and supervise when necessary  - Involve family in performance of ADLs  - Assess for home care needs following discharge   - Consider OT consult to assist with ADL evaluation and planning for discharge  - Provide patient education as appropriate  Outcome: Progressing  Goal: Maintains/Returns to pre admission functional level  Description: INTERVENTIONS:  - Perform BMAT or MOVE assessment daily    - Set and communicate daily mobility goal to care team and patient/family/caregiver  - Collaborate with rehabilitation services on mobility goals if consulted  - Perform Range of Motion 2 times a day  - Reposition patient every 2 hours    - Dangle patient 2 times a day  - Stand patient 3 times a day  - Ambulate patient 3 times a day  - Out of bed to chair 3 times a day   - Out of bed for meals 3 times a day  - Out of bed for toileting  - Record patient progress and toleration of activity level   Outcome: Progressing     Problem: Prexisting or High Potential for Compromised Skin Integrity  Goal: Skin integrity is maintained or improved  Description: INTERVENTIONS:  - Identify patients at risk for skin breakdown  - Assess and monitor skin integrity  - Assess and monitor nutrition and hydration status  - Monitor labs   - Assess for incontinence   - Turn and reposition patient  - Assist with mobility/ambulation  - Relieve pressure over bony prominences  - Avoid friction and shearing  - Provide appropriate hygiene as needed including keeping skin clean and dry  - Evaluate need for skin moisturizer/barrier cream  - Collaborate with interdisciplinary team   - Patient/family teaching  - Consider wound care consult   Outcome: Progressing

## 2023-05-06 NOTE — PROGRESS NOTES
[facility]  Progress Note  Name: Neel Choudhary  MRN: [id number]  Unit/Bed#: S -56 I Date of Admission: 5/4/2023   Date of Service: 5/6/2023 I Hospital Day: 2    Assessment/Plan   * Hemorrhagic cystitis w/ pyelonephritis  Assessment & Plan  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  - On arrival patient hemodynamically stable, blood work showing leukocytosis (13 84), mild hyponatremia (132), anion gap (17) metabolic acidosis (8 259/22 1/42 9/9 4), MONROE (Cr 4 26; baseline 0 9 - 1 1)  - UA revealing innumerable WBC, RBC and occasional bacteria  - Urine grossly bloody requiring 3 way catheter for continuous irrigation as manley was clotting   - CT abd revealing no obstructive uropathy, mild left hydronephrosis and hydrorureter (pyelonephritis not excluded), persistent circumferential bladder wall thickening possibly indicating cystitis as well as severe constipation   - Physical exam notable for ecchymosis around the lower abdomen from patients home lovenox; denies any abdominal pain, dysuria, N/V, fever, chills  - Patient complaining of ongoing decreased appetite for last week and a half, fatigue   Also reports not having a BM for > 1 week  - Patient has history of UTI's last one in March, positive cultures for pseudomonas in '20   - Suspect bleed 2/2 to cystitis causing MONROE possibly resulting in super therapeutic dose of lovenox   No longer on CBI  Completed sodium bicarb, IVF  cc for 6 hours    Plan  Appreciate nephrology recommendations  Continue cefepime  UA culture-Staph aureus, awaiting sensitivities   Voiding trial today, remove Sifuentes  Urinary retention protocol  Consider urology consult if patient fails voiding trial  US Kidney Bladder tomorrow     Acute on chronic kidney disease  Assessment & Plan  -  Baseline Cr: 0 9-1 1  - Prior CDK3a now 5   - Suspect 2/2 to UTI  Estimated Creatinine Clearance: 13 7 mL/min (A) (by C-G formula based on SCr of 3 15 mg/dL (H))  Cr-3 17    Plan  Avoid nephrotoxic drugs  Avoid hypotension  Monitor BMP    History of DVT (deep vein thrombosis)  Assessment & Plan  - Patient with DVT in 2017  - Currently on Lovenox 80 mg daily  VAS US-negative    Plan  - Hold lovenox in setting of MONROE  -Continue heparin DVT prophylaxis    Hyperphosphatemia  Assessment & Plan  - Phosphate of 8 4 on admission  - In setting of acute on chronic disease     Plan  - Start PhosLo tomorrow   - recheck phosphate     Constipation  Assessment & Plan  - Sever constipation noted on CT abdomen  - Patient reports no BM in over a week  - Patient without any abdominal pain/tenderness or distention     Plan-  Resume regular diet  Bisacodyl suppository   If no resolution will order enema    Severe protein-calorie malnutrition (HCC)  Assessment & Plan  Malnutrition Findings:   Adult Malnutrition type: Chronic illness  Adult Degree of Malnutrition: Other severe protein calorie malnutrition  Malnutrition Characteristics: Muscle loss, Fat loss                  360 Statement: inadequate intake related to poor appetite as evidenced by dark sunken orbital, wasted temporalis, buccal pads, hollow supraclavicular space, wasted interosseous  Int: Advance diet to regular, as medically indicated,  medical food supplements  BMI Findings: Body mass index is 23 43 kg/m²         Endometrial cancer [facility])  Assessment & Plan  - Diagnosis '14; s/p total abdominal hysterectomy and bilateral salpingo oophorectomy   - Status post ENDOBARR (rucaparib, atezolizumab, and bevacizumab) clinical trial started in 2020 and finished 2022  - She is currently under active surveillance    Metabolic acidosis, increased anion gap-resolved as of 2023  Assessment & Plan  - On arrival  2 (VBG)  - Anion gap of 17   - Serum bicarb of 11  - Suspect 2/2 to renal failure due to infection   S/p - Sodium bicarb drip and IVF  mL/hr     Plan  Monitor BMP    Hyponatremia-resolved as of 2023  Assessment & Plan  - Corrected from 132 to 135 during admission  In a 137    Plan  -BMP         VTE Pharmacologic Prophylaxis: VTE Score: 6 High Risk (Score >/= 5) - Pharmacological DVT Prophylaxis Ordered: heparin  Sequential Compression Devices Ordered  Patient Centered Rounds: I performed bedside rounds with nursing staff today  Discussions with Specialists or Other Care Team Provider: Nephrology    Education and Discussions with Family / Patient: Updated  (sister) via phone  Current Length of Stay: 2 day(s)  Current Patient Status: Inpatient   Discharge Plan: Anticipate discharge in 24-48 hrs to home with home services  Code Status: Level 1 - Full Code    Subjective:   Patient was seen and examined at the bedside  Patient is resting comfortably no acute distress  Patient reports she feels well and has no complaints  Patient denies fever, chills, chest pain, shortness of breath, palpitation, abdominal pain, dysuria, frequency, flank pain, hematuria, leg pain, or any other symptoms at this time  Objective:     Vitals:   Temp (24hrs), Av 8 °F (36 6 °C), Min:97 6 °F (36 4 °C), Max:98 1 °F (36 7 °C)    Temp:  [97 6 °F (36 4 °C)-98 1 °F (36 7 °C)] 97 8 °F (36 6 °C)  HR:  [70-75] 70  Resp:  [16] 16  BP: ()/(56-63) 102/62  SpO2:  [97 %-99 %] 99 %  Body mass index is 23 43 kg/m²  Input and Output Summary (last 24 hours):      Intake/Output Summary (Last 24 hours) at 5/6/2023 1303  Last data filed at 5/6/2023 3356  Gross per 24 hour   Intake 120 ml   Output 1650 ml   Net -1530 ml       Physical Exam:   Physical Exam  Vitals and nursing note reviewed  Constitutional:       General: She is not in acute distress  Appearance: She is well-developed  She is ill-appearing  She is not toxic-appearing or diaphoretic  HENT:      Head: Normocephalic and atraumatic  Mouth/Throat:      Mouth: Mucous membranes are moist    Eyes:      Extraocular Movements: Extraocular movements intact  Conjunctiva/sclera: Conjunctivae normal       Pupils: Pupils are equal, round, and reactive to light  Cardiovascular:      Rate and Rhythm: Normal rate and regular rhythm  Pulses: Normal pulses  Heart sounds: Normal heart sounds  No murmur heard  No friction rub  No gallop  Pulmonary:      Effort: Pulmonary effort is normal  No respiratory distress  Breath sounds: Normal breath sounds  No wheezing or rhonchi  Chest:      Chest wall: No tenderness  Abdominal:      General: Bowel sounds are normal       Palpations: Abdomen is soft  Tenderness: There is no abdominal tenderness  There is no right CVA tenderness, left CVA tenderness, guarding or rebound  Genitourinary:     Comments: Sifuentes catheter in place draining clear urine  Musculoskeletal:         General: No tenderness  Normal range of motion  Cervical back: Neck supple  Right lower leg: No edema  Left lower leg: No edema  Skin:     General: Skin is warm and dry  Capillary Refill: Capillary refill takes less than 2 seconds  Neurological:      General: No focal deficit present  Mental Status: She is alert and oriented to person, place, and time  Cranial Nerves: No cranial nerve deficit  Sensory: No sensory deficit  Motor: No weakness        Coordination: Coordination normal    Psychiatric:         Mood and Affect: Mood normal          Behavior: Behavior normal          Thought Content: Thought content normal           Additional Data:     Labs:  Results from last 7 days   Lab Units 05/06/23  0535 05/05/23  0537   WBC Thousand/uL 9 82 13 02*   HEMOGLOBIN g/dL 7 9* 8 0*   HEMATOCRIT % 25 6* 26 1*   PLATELETS Thousands/uL 323 328   NEUTROS PCT %  --  85*   LYMPHS PCT %  --  5*   MONOS PCT %  --  8   EOS PCT %  --  0     Results from last 7 days   Lab Units 05/06/23  0535   SODIUM mmol/L 139   POTASSIUM mmol/L 4 1   CHLORIDE mmol/L 108   CO2 mmol/L 21   BUN mg/dL 65*   CREATININE mg/dL 3 15*   ANION GAP mmol/L 10   CALCIUM mg/dL 8 5   ALBUMIN g/dL 2 9*   TOTAL BILIRUBIN mg/dL 0 29   ALK PHOS U/L 90   ALT U/L 5*   AST U/L 6*   GLUCOSE RANDOM mg/dL 85                       Lines/Drains:  Invasive Devices     Central Venous Catheter Line  Duration           Port A Cath 11/12/19 Left Chest 1270 days          Peripheral Intravenous Line  Duration           Peripheral IV 05/04/23 Distal;Dorsal (posterior); Left Forearm 1 day    Peripheral IV 05/04/23 Right;Ventral (anterior) Antecubital 1 day          Drain  Duration           Ureteral Internal Stent Right ureter 6 Fr  37 days                Central Line:  Goal for removal: Voiding trial today             Imaging: Reviewed radiology reports from this admission including: abdominal/pelvic CT and ultrasound(s)    Recent Cultures (last 7 days):   Results from last 7 days   Lab Units 05/04/23 2157 05/04/23 2156 05/04/23 2127   BLOOD CULTURE  No Growth at 24 hrs   No Growth at 24 hrs   --    URINE CULTURE   --   --  >100,000 cfu/ml Staphylococcus aureus*       Last 24 Hours Medication List:   Current Facility-Administered Medications   Medication Dose Route Frequency Provider Last Rate   • acetaminophen  650 mg Oral Q4H PRN Vipin Christianson DO     • ARIPiprazole  20 mg Oral Daily [facility] Jay connelly, DO     • aspirin  81 mg Oral QPM Vipin Christianson DO     • bisacodyl  10 mg Rectal Once PRN May Thu Salvador MD     • cefepime  1,000 mg Intravenous Q24H May Connie Miles MD 1,000 mg (05/05/23 2226)   • docusate sodium  100 mg Oral BID May Connie Franco MD     • folic acid  1 mg Oral Daily Doug Manning DO     • heparin (porcine)  5,000 Units Subcutaneous [facility] Doug connelly, DO     • multi-electrolyte  100 mL/hr Intravenous Continuous Ravinder Matthews MD     • oxybutynin  15 mg Oral HS Doug connelly DO     • oxyCODONE  5 mg Oral Q4H PRN Doug Manning DO     • oxyCODONE  2 5 mg Oral Q4H PRN Doug Manning DO     • pantoprazole  40 mg Oral Early Morning Doug Manning DO     • polyethylene glycol  17 g Oral Daily Doug Manning DO     • saccharomyces boulardii  250 mg Oral BID Doug connelly DO     • venlafaxine  75 mg Oral TID Briseida Decker DO          Today, Patient Was Seen By: Jhon Michel MD    **Please Note: This note may have been constructed using a voice recognition system  **

## 2023-05-06 NOTE — ASSESSMENT & PLAN NOTE
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  - On arrival patient hemodynamically stable, blood work showing leukocytosis (13 84), mild hyponatremia (132), anion gap (17) metabolic acidosis (5 765/12 8/34 1/3 0), MONROE (Cr 4 26; baseline 0 9 - 1 1)  - UA revealing innumerable WBC, RBC and occasional bacteria  - Urine grossly bloody requiring 3 way catheter for continuous irrigation as manley was clotting   - CT abd revealing no obstructive uropathy, mild left hydronephrosis and hydrorureter (pyelonephritis not excluded), persistent circumferential bladder wall thickening possibly indicating cystitis as well as severe constipation   - Physical exam notable for ecchymosis around the lower abdomen from patients home lovenox; denies any abdominal pain, dysuria, N/V, fever, chills  - Patient complaining of ongoing decreased appetite for last week and a half, fatigue   Also reports not having a BM for > 1 week  - Patient has history of UTI's last one in March, positive cultures for pseudomonas in '20   - Suspect bleed 2/2 to cystitis causing MONROE possibly resulting in super therapeutic dose of lovenox   No longer on CBI  Completed sodium bicarb, IVF  cc for 6 hours    Plan  Appreciate nephrology recommendations  Continue cefepime  UA culture-Staph aureus, awaiting sensitivities   Voiding trial today, remove Manley  Urinary retention protocol  Consider urology consult if patient fails voiding trial  Follow-up US Kidney Bladder report

## 2023-05-06 NOTE — UTILIZATION REVIEW
Continued Stay Review    Date: 5/6/23                        Current Patient Class: inpatient   Current Level of Care: med surg     HPI:67 y o  female initially admitted on 5/4/23 inpatient due to hemorrhagic cystitis with pyelonephritis/Acute on chronic kidney disease  PMHx of endometrial cancer s/p chemotherapy currently in remission, DVT, CKD3, HTN, anemia, chronic UTI, hydronephrosis requiring nephroureteral stent placement 2/2 to cancer   Baseline Cr: 0 9-1 1    Assessment/Plan:   Date: 5/6/23     Day 3: Has surpassed a 2nd midnight with active treatments and services  Still hypotensive at times  Weakness less  On exam appears ill  Manley in place with yellow urine  H&h 7 9/25 6  Bun 65  Creatinine 3 15  Continue IV Cefepime  Dc manley  Void trial    Us kidney and bladder tomorrow  Continue IVF  Vital Signs:   05/06/23 15:33:48 97 9 °F (36 6 °C) 57 20 105/61 76 94 % -- --   05/06/23 0913 -- -- -- 102/62 -- -- -- Lying   05/06/23 0900 -- -- -- -- -- -- None (Room air) --   05/06/23 0751 97 8 °F (36 6 °C) 70 16 88/56 Abnormal  -- 99 % None (Room air      Pertinent Labs/Diagnostic Results:   VAS lower limb venous duplex study, complete bilateral   Final Result by Swati Sandhu MD (05/05 5627)   RIGHT LOWER LIMB:  No evidence of acute or chronic deep vein thrombosis  No evidence of superficial thrombophlebitis noted  No evidence of valvular incompetence noted in the deep veins  Popliteal, posterior tibial and anterior tibial arterial Doppler waveform's are  triphasic  LEFT LOWER LIMB:  No evidence of acute or chronic deep vein thrombosis  No evidence of superficial thrombophlebitis noted  No evidence of valvular incompetence noted in the deep veins  Popliteal, posterior tibial and anterior tibial arterial Doppler waveform's are  triphasic       Tech Note:  There is an echogenic structure located in the Right inguinal region measuring  approximately 1 83 cm suggestive of enlarged lymphatic channels  CT abdomen pelvis wo contrast   Final Result by Uche Ortiz MD (05/04 2143)         1  Right ureteral stent in expected position  Resolution of subcapsular collection  No obstructive uropathy  2  Mild left hydronephrosis and hydroureter  No obstructing calculus  Pyelonephritis not excluded  3  Persistent circumferential bladder wall thickening could indicate cystitis  4  Severe constipation  Workstation performed: [id number]         US kidney and bladder with pvr   Final Result by Sheldon Serrano MD (05/04 1952)      The nephroureteral stent is in place  Mild bilateral hydronephrosis more prominent on the left  Chronic circumferential bladder wall thickening              Workstation performed: [id number]         US kidney and bladder with pvr    (Results Pending)     Results from last 7 days   Lab Units 05/06/23  0535 05/05/23  0537 05/05/23  0024 05/04/23  1357   WBC Thousand/uL 9 82 13 02*  --  13 84*   HEMOGLOBIN g/dL 7 9* 8 0*  --  9 4*   HEMATOCRIT % 25 6* 26 1*  --  31 4*   PLATELETS Thousands/uL 323 328 292 393*   NEUTROS ABS Thousands/µL  --  11 09*  --   --          Results from last 7 days   Lab Units 05/06/23  0535 05/06/23  0034 05/05/23  1656 05/05/23  0537 05/04/23  2157 05/04/23  1357   SODIUM mmol/L 139 137 136 137 135 132*   POTASSIUM mmol/L 4 1 3 9 4 0 3 2* 4 6 4 6   CHLORIDE mmol/L 108 105 102 104 109* 103   CO2 mmol/L 21 21 22 19* 11* 12*   ANION GAP mmol/L 10 11 12 14* 15* 17*   BUN mg/dL 65* 69* 71* 78* 87* 89*   CREATININE mg/dL 3 15* 3 17* 3 48* 3 60* 4 10* 4 26*   EGFR ml/min/1 73sq m 14 14 13 12 10 10   CALCIUM mg/dL 8 5 8 5 8 3* 8 2* 8 7 9 2   MAGNESIUM mg/dL 2 2  --  2 4 1 5*  --   --    PHOSPHORUS mg/dL 5 2*  --  5 2* 6 8*  --  8 4*     Results from last 7 days   Lab Units 05/06/23  0535 05/05/23  0537 05/04/23  1357   AST U/L 6* 6* 7*   ALT U/L 5* 5* 7   ALK PHOS U/L 90 94 118*   TOTAL PROTEIN g/dL 7 1 7 3 8 9*   ALBUMIN g/dL 2 9* 3 0* 3 7   TOTAL BILIRUBIN mg/dL 0 29 0 32 0 33         Results from last 7 days   Lab Units 05/06/23  0535 05/06/23  0034 05/05/23 1656 05/05/23  0537 05/04/23 2157 05/04/23  1357   GLUCOSE RANDOM mg/dL 85 94 155* 291* 81 108     Results from last 7 days   Lab Units 05/05/23  1656 05/05/23  0024 05/04/23  1941   PH MIESHA  7 322 7 309 7 207*   PCO2 MIESHA mm Hg 42 8 66 5* 18 5*   PO2 MIESHA mm Hg 44 4 35 6 86 7*   HCO3 MIESHA mmol/L 21 7* 32 7* 7 2*   BASE EXC MIESHA mmol/L -4 1 5 3 -18 9   O2 CONTENT MIESHA ml/dL 8 8 7 7 11 0   O2 HGB, VENOUS % 72 9 62 9 91 2*     Results from last 7 days   Lab Units 05/05/23  0537   PTT seconds 36     Results from last 7 days   Lab Units 05/04/23  1357   FERRITIN ng/mL 1,618*   IRON SATURATION % 39   IRON ug/dL 74   TIBC ug/dL 188*     Results from last 7 days   Lab Units 05/04/23 2127   CLARITY UA  Extra Turbid   COLOR UA  Light Orange   SPEC GRAV UA  1 006   PH UA  5 0   GLUCOSE UA mg/dl Negative   KETONES UA mg/dl Negative   BLOOD UA  Large*   PROTEIN UA mg/dl 30 (1+)*   NITRITE UA  Negative   BILIRUBIN UA  Negative   UROBILINOGEN UA (BE) mg/dl <2 0   LEUKOCYTES UA  Large*   WBC UA /hpf Innumerable*   RBC UA /hpf Innumerable*   BACTERIA UA /hpf Occasional   EPITHELIAL CELLS WET PREP /hpf None Seen     Results from last 7 days   Lab Units 05/04/23 2157 05/04/23 2156 05/04/23 2127   BLOOD CULTURE  No Growth at 24 hrs   No Growth at 24 hrs   --    URINE CULTURE   --   --  >100,000 cfu/ml Staphylococcus aureus*     Results from last 7 days   Lab Units 05/04/23  1357   TOTAL COUNTED  100       Medications:   Scheduled Medications:  ARIPiprazole, 20 mg, Oral, Daily  aspirin, 81 mg, Oral, QPM  cefepime, 1,000 mg, Intravenous, Q24H  docusate sodium, 100 mg, Oral, BID  folic acid, 1 mg, Oral, Daily  heparin (porcine), 5,000 Units, Subcutaneous, Q8H CHRISTIANO  oxybutynin, 15 mg, Oral, HS  pantoprazole, 40 mg, Oral, Early Morning  polyethylene glycol, 17 g, Oral, Daily  saccharomyces boulardii, 250 mg, Oral, BID  venlafaxine, 75 mg, Oral, TID      Continuous IV Infusions:  multi-electrolyte, 100 mL/hr, Intravenous, Continuous      PRN Meds:  acetaminophen, 650 mg, Oral, Q4H PRN  bisacodyl, 10 mg, Rectal, Once PRN  oxyCODONE, 5 mg, Oral, Q4H PRN  oxyCODONE, 2 5 mg, Oral, Q4H PRN        Discharge Plan: To be determined  Network Utilization Review Department  ATTENTION: Please call with any questions or concerns to [phone] and carefully listen to the prompts so that you are directed to the right person  All voicemails are confidential   Annette De La Rosa all requests for admission clinical reviews, approved or denied determinations and any other requests to dedicated fax number below belonging to the campus where the patient is receiving treatment   List of dedicated fax numbers for the Facilities:  [address] DENIALS (Administrative/Medical Necessity) [phone]   [address] (Maternity/NICU/Pediatrics) [phone]   7 Azalia Luz [phone]   [facility] [address] [phone]   [address] [place] [address] [place] [address] [phone]   1554 [facility] [address] [place] [address] 815 [facility] [phone]

## 2023-05-06 NOTE — QUICK NOTE
Attempted to update sister Chon Fitting, unfortunately I am unable to reach, left voicemail    Will try again

## 2023-05-07 ENCOUNTER — APPOINTMENT (INPATIENT)
Dept: ULTRASOUND IMAGING | Facility: HOSPITAL | Age: 68
End: 2023-05-07

## 2023-05-07 LAB
ANION GAP SERPL CALCULATED.3IONS-SCNC: 10 MMOL/L (ref 4–13)
BACTERIA UR CULT: ABNORMAL
BACTERIA UR CULT: ABNORMAL
BUN SERPL-MCNC: 58 MG/DL (ref 5–25)
CALCIUM SERPL-MCNC: 8.6 MG/DL (ref 8.4–10.2)
CHLORIDE SERPL-SCNC: 106 MMOL/L (ref 96–108)
CO2 SERPL-SCNC: 24 MMOL/L (ref 21–32)
CREAT SERPL-MCNC: 3.15 MG/DL (ref 0.6–1.3)
ERYTHROCYTE [DISTWIDTH] IN BLOOD BY AUTOMATED COUNT: 14.6 % (ref 11.6–15.1)
GFR SERPL CREATININE-BSD FRML MDRD: 14 ML/MIN/1.73SQ M
GLUCOSE SERPL-MCNC: 84 MG/DL (ref 65–140)
HCT VFR BLD AUTO: 25.6 % (ref 34.8–46.1)
HGB BLD-MCNC: 7.7 G/DL (ref 11.5–15.4)
MCH RBC QN AUTO: 28.4 PG (ref 26.8–34.3)
MCHC RBC AUTO-ENTMCNC: 30.1 G/DL (ref 31.4–37.4)
MCV RBC AUTO: 95 FL (ref 82–98)
PLATELET # BLD AUTO: 293 THOUSANDS/UL (ref 149–390)
PMV BLD AUTO: 10.3 FL (ref 8.9–12.7)
POTASSIUM SERPL-SCNC: 4.1 MMOL/L (ref 3.5–5.3)
RBC # BLD AUTO: 2.71 MILLION/UL (ref 3.81–5.12)
SODIUM SERPL-SCNC: 140 MMOL/L (ref 135–147)
WBC # BLD AUTO: 11.94 THOUSAND/UL (ref 4.31–10.16)

## 2023-05-07 RX ORDER — HEPARIN SODIUM 5000 [USP'U]/ML
5000 INJECTION, SOLUTION INTRAVENOUS; SUBCUTANEOUS EVERY 8 HOURS SCHEDULED
Status: COMPLETED | OUTPATIENT
Start: 2023-05-07 | End: 2023-05-07

## 2023-05-07 RX ADMIN — PANTOPRAZOLE SODIUM 40 MG: 40 TABLET, DELAYED RELEASE ORAL at 05:23

## 2023-05-07 RX ADMIN — DOCUSATE SODIUM 100 MG: 100 CAPSULE, LIQUID FILLED ORAL at 18:31

## 2023-05-07 RX ADMIN — HEPARIN SODIUM 5000 UNITS: 5000 INJECTION INTRAVENOUS; SUBCUTANEOUS at 05:22

## 2023-05-07 RX ADMIN — CALCIUM ACETATE 667 MG: 667 CAPSULE ORAL at 18:33

## 2023-05-07 RX ADMIN — HEPARIN SODIUM 5000 UNITS: 5000 INJECTION INTRAVENOUS; SUBCUTANEOUS at 13:01

## 2023-05-07 RX ADMIN — Medication 250 MG: at 18:31

## 2023-05-07 RX ADMIN — POLYETHYLENE GLYCOL 3350 17 G: 17 POWDER, FOR SOLUTION ORAL at 08:13

## 2023-05-07 RX ADMIN — SODIUM CHLORIDE, SODIUM GLUCONATE, SODIUM ACETATE, POTASSIUM CHLORIDE, MAGNESIUM CHLORIDE, SODIUM PHOSPHATE, DIBASIC, AND POTASSIUM PHOSPHATE 100 ML/HR: .53; .5; .37; .037; .03; .012; .00082 INJECTION, SOLUTION INTRAVENOUS at 00:12

## 2023-05-07 RX ADMIN — OXYCODONE HYDROCHLORIDE 5 MG: 5 TABLET ORAL at 18:31

## 2023-05-07 RX ADMIN — ASPIRIN 81 MG: 81 TABLET, COATED ORAL at 18:31

## 2023-05-07 RX ADMIN — VENLAFAXINE 75 MG: 37.5 TABLET ORAL at 08:13

## 2023-05-07 RX ADMIN — DOCUSATE SODIUM 100 MG: 100 CAPSULE, LIQUID FILLED ORAL at 08:13

## 2023-05-07 RX ADMIN — OXYCODONE HYDROCHLORIDE 5 MG: 5 TABLET ORAL at 08:14

## 2023-05-07 RX ADMIN — Medication 250 MG: at 08:13

## 2023-05-07 RX ADMIN — VENLAFAXINE 75 MG: 37.5 TABLET ORAL at 18:33

## 2023-05-07 RX ADMIN — HEPARIN SODIUM 5000 UNITS: 5000 INJECTION INTRAVENOUS; SUBCUTANEOUS at 21:53

## 2023-05-07 RX ADMIN — ARIPIPRAZOLE 20 MG: 5 TABLET ORAL at 08:13

## 2023-05-07 RX ADMIN — CALCIUM ACETATE 667 MG: 667 CAPSULE ORAL at 08:13

## 2023-05-07 RX ADMIN — FOLIC ACID 1 MG: 1 TABLET ORAL at 08:13

## 2023-05-07 RX ADMIN — VENLAFAXINE 75 MG: 37.5 TABLET ORAL at 21:52

## 2023-05-07 RX ADMIN — OXYBUTYNIN CHLORIDE 15 MG: 5 TABLET, EXTENDED RELEASE ORAL at 21:52

## 2023-05-07 RX ADMIN — CEFEPIME 1000 MG: 1 INJECTION, POWDER, FOR SOLUTION INTRAMUSCULAR; INTRAVENOUS at 21:57

## 2023-05-07 RX ADMIN — CALCIUM ACETATE 667 MG: 667 CAPSULE ORAL at 12:53

## 2023-05-07 NOTE — CONSULTS
UROLOGY CONSULTATION NOTE     Patient Identifiers: Flakito Geronimo (MRN [id number])  Service Requesting Consultation: SLIM  Service Providing Consultation:  Urology, Agustin Palacios    Date of Service: 5/7/2023  Inpatient consult to Urology  Consult performed by: RAFITA Song  Consult ordered by: Dorothy Puente MD          Reason for Consultation: UTI    ASSESSMENT and PLAN     UTI  · Continue antibiotics  · Urine john  · Culture pending     MONROE  · Creat 4 1--3 6--3 1 x 3   · Creat 1 6 in March   · Nephrology following   · Retention protocol, Q shift bladder scans     Left hydronephrosis  · US ordered to evaluate, if left-sided hydro persistent, may require intervention         History of Present Illness:     Flakito Geronimo is a 74 y o  old with a history of CKD and endometrial cancer with subsequent right hydronephrosis due to compression managed with chronic right ureteral stent last exchanged in March with Dr Kevin Becker  Patient presented to the ED at the recommendation of her nephrologist for worsening renal function and metabolic acidosis  She reported fatigue and poor appetite for previous week  CT revealed right ureteral stent in proper positioning, mild left hydronephrosis and hydroureter with circumferential bladder wall thickening and severe constipation        Past Medical, Past Surgical History:     Past Medical History:   Diagnosis Date   • Anxiety    • CKD (chronic kidney disease) stage 3, GFR 30-59 ml/min ([address] )    • COVID     Fall 2022   • Depression    • Endometrial cancer ([address] ) 12/2017   • GERD (gastroesophageal reflux disease)    • H/O gastric bypass    • Hard to intubate     pt denies   • History of chemotherapy     started 12/2021endometrial cancer- done 12/23/22   • History of DVT in adulthood     RLE   • Hyperglycemia     vx type 2 dm -- last assessed 4/1/14; resolved 11/7/17   • Hyperlipidemia    • Hypertension     in past- no meds at present   • Iron deficiency anemia iron infusions weekly   • OAB (overactive bladder)    • Port-A-Cath in place    • Wears glasses    :    Past Surgical History:   Procedure Laterality Date   • ABDOMINAL SURGERY      GASTRIC BYPASS; 2001   • BREAST BIOPSY Right 06/28/2019    core biopsy; benign   • CHOLECYSTECTOMY      at the time of gastric bypass   • COLONOSCOPY     • CT NEEDLE BIOPSY LYMPH NODE  07/08/2019   • FL GUIDED CENTRAL VENOUS ACCESS DEVICE INSERTION  11/12/2019   • FL RETROGRADE PYELOGRAM  3/30/2023   • GASTRIC BYPASS     • HYSTERECTOMY Bilateral 2017    total abdominal with salpingo-oophorectomy   • IR NEPHROSTOMY TO NEPHROURETERAL STENT  06/09/2022   • IR NEPHROSTOMY TO NEPHROURETERAL STENT  12/20/2022   • IR NEPHROSTOMY TUBE CHECK/CHANGE/REPOSITION/REINSERTION/UPSIZE  05/27/2022   • IR NEPHROSTOMY TUBE PLACEMENT  07/26/2019   • IR NEPHROURETERAL STENT CHECK/CHANGE/REPOSITION  04/06/2021   • IR NEPHROURETERAL STENT CHECK/CHANGE/REPOSITION  06/04/2021   • IR NEPHROURETERAL STENT CHECK/CHANGE/REPOSITION  09/03/2021   • IR NEPHROURETERAL STENT CHECK/CHANGE/REPOSITION  12/07/2021   • IR NEPHROURETERAL STENT CHECK/CHANGE/REPOSITION  03/08/2022   • IR NEPHROURETERAL STENT CHECK/CHANGE/REPOSITION  05/10/2022   • IR NEPHROURETERAL STENT CHECK/CHANGE/REPOSITION  09/19/2022   • IR PICC LINE  09/27/2019   • IR PORT PLACEMENT  07/26/2019   • IR PORT REMOVAL  09/20/2019   • OOPHORECTOMY Bilateral 2017   • TN CYSTO BLADDER W/URETERAL CATHETERIZATION Right 3/30/2023    Procedure: CYSTOSCOPY RETROGRADE PYELOGRAM WITH exchange of STENT URETERAL;  Surgeon: Colletta Driver, MD;  [facility];  Service: Urology   • TN CYSTO W/INSERT URETERAL STENT Right 3/30/2023    Procedure: EXCHANGE STENT URETERAL;  Surgeon: Colletta Driver, MD;  [facility];  Service: Urology   • TN INSJ TUNNELED CTR VAD W/SUBQ PORT AGE 5 YR/> Left 11/12/2019    Procedure: INSERTION VENOUS PORT ( PORT-A-CATH) IR;  Surgeon: Maty José DO;  [facility];  Service: Interventional Radiology   • WA LAPS ABD PRTM&OMENTUM DX W/WO SPEC BR/WA SPX N/A 12/19/2017    Procedure: LAPAROSCOPY DIAGNOSTIC;  Surgeon: Richard Sellers MD;  Location[facility];  Service: Gynecology Oncology   • WA LAPS W/RAD HYST W/BILAT LMPHADEC RMVL TUBE/OVARY N/A 12/19/2017    Procedure: HYSTERECTOMY LAPAROSCOPIC TOTAL ([address]) W/ ROBOTICS; BILATERAL SALPINGOOPHERECTOMY; LYMPH NODE DISSECTION; lysis of adhesions;  Surgeon: Richard Sellers MD;  Location[facility];  Service: Gynecology Oncology   • TONSILLECTOMY     • US GUIDED BREAST BIOPSY RIGHT COMPLETE Right 06/28/2019   :    Medications, Allergies:     Current Facility-Administered Medications   Medication Dose Route Frequency   • acetaminophen (TYLENOL) tablet 650 mg  650 mg Oral Q4H PRN   • ARIPiprazole (ABILIFY) tablet 20 mg  20 mg Oral Daily   • aspirin (ECOTRIN LOW STRENGTH) EC tablet 81 mg  81 mg Oral QPM   • bisacodyl (DULCOLAX) rectal suppository 10 mg  10 mg Rectal Once PRN   • calcium acetate (PHOSLO) capsule 667 mg  667 mg Oral TID With Meals   • cefepime (MAXIPIME) 1,000 mg in dextrose 5 % 50 mL IVPB  1,000 mg Intravenous Q24H   • docusate sodium (COLACE) capsule 100 mg  100 mg Oral BID   • folic acid (FOLVITE) tablet 1 mg  1 mg Oral Daily   • heparin (porcine) subcutaneous injection 5,000 Units  5,000 Units Subcutaneous Q8H [address]   • oxybutynin (DITROPAN-XL) 24 hr tablet 15 mg  15 mg Oral HS   • oxyCODONE (ROXICODONE) IR tablet 5 mg  5 mg Oral Q4H PRN   • oxyCODONE (ROXICODONE) split tablet 2 5 mg  2 5 mg Oral Q4H PRN   • pantoprazole (PROTONIX) EC tablet 40 mg  40 mg Oral Early Morning   • polyethylene glycol (MIRALAX) packet 17 g  17 g Oral Daily   • saccharomyces boulardii (FLORASTOR) capsule 250 mg  250 mg Oral BID   • venlafaxine (EFFEXOR) tablet 75 mg  75 mg Oral TID       Allergies:   Allergies   Allergen Reactions   • Cephalosporins Rash     Which Cephalosporin reaction was to not specified; however, has "tolerated Amoxicillin, Cefazolin, and Cefepime   :    Social and Family History:   Social History:   Social History     Tobacco Use   • Smoking status: Never   • Smokeless tobacco: Never   Vaping Use   • Vaping Use: Never used   Substance Use Topics   • Alcohol use: Not Currently   • Drug use: No        Social History     Tobacco Use   Smoking Status Never   Smokeless Tobacco Never       Family History:  Family History   Problem Relation Age of Onset   • Hyperlipidemia Mother    • Heart disease Mother    • Ovarian cancer Mother 48   • Colon cancer Mother    • Lymphoma Father    • Breast cancer Sister 61   • No Known Problems Brother    • No Known Problems Brother    • No Known Problems Maternal Grandmother    • Bone cancer Maternal Grandfather    • Uterine cancer Paternal Grandmother    • No Known Problems Paternal Grandfather    • No Known Problems Maternal Aunt    • No Known Problems Maternal Aunt    • No Known Problems Maternal Aunt    • No Known Problems Maternal Aunt    • No Known Problems Paternal Aunt    • No Known Problems Paternal Aunt    • No Known Problems Paternal Aunt    • No Known Problems Paternal Aunt    :     Review of Systems:     Review of Systems - History obtained from chart review and the patient  General ROS: negative  Respiratory ROS: no cough, shortness of breath, or wheezing  Cardiovascular ROS: no chest pain or dyspnea on exertion  Gastrointestinal ROS: no abdominal pain, change in bowel habits, or black or bloody stools  Genito-Urinary ROS: no dysuria, trouble voiding, or hematuria  Musculoskeletal ROS: negative    All other systems queried were negative  Physical Exam:   General: Patient is pleasant and in NAD   Awake and alert  /62 (BP Location: Left arm)   Pulse 65   Temp 97 8 °F (36 6 °C) (Oral)   Resp 18   Ht 4' 11\" (1 499 m) Comment: last ht assessment  LMP  (LMP Unknown)   SpO2 97%   BMI 23 43 kg/m² Temp (24hrs), Av 8 °F (36 6 °C), Min:97 7 °F (36 5 °C), Max:97 9 " "°F (36 6 °C)  current; Temperature: 97 8 °F (36 6 °C)  I/O last 24 hours: In: 7098 [P O :240; I V :995]  Out: 2275 [Urine:2275]    /62 (BP Location: Left arm)   Pulse 65   Temp 97 8 °F (36 6 °C) (Oral)   Resp 18   Ht 4' 11\" (1 499 m) Comment: last ht assessment  LMP  (LMP Unknown)   SpO2 97%   BMI 23 43 kg/m²   General appearance: alert and oriented, in no acute distress  Back: symmetric, no curvature  ROM normal  No CVA tenderness  Lungs: clear to auscultation bilaterally  Heart: regular rate and rhythm, S1, S2 normal, no murmur, click, rub or gallop  Abdomen: soft, non-tender; bowel sounds normal; no masses,  no organomegaly  Skin: pale, ecchymosis right hip   : urine john     Labs:     Lab Results   Component Value Date    HGB 7 7 (L) 05/07/2023    HCT 25 6 (L) 05/07/2023    WBC 11 94 (H) 05/07/2023     05/07/2023   ]    Lab Results   Component Value Date     10/27/2017    K 4 1 05/07/2023     05/07/2023    CO2 24 05/07/2023    BUN 58 (H) 05/07/2023    CREATININE 3 15 (H) 05/07/2023    CALCIUM 8 6 05/07/2023    GLUCOSE 219 (H) 12/19/2017   ]    Imaging:       CT  1  Right ureteral stent in expected position  Resolution of subcapsular collection  No obstructive uropathy  2  Mild left hydronephrosis and hydroureter  No obstructing calculus  Pyelonephritis not excluded  3  Persistent circumferential bladder wall thickening could indicate cystitis  4  Severe constipation  US   pending          Thank you for allowing me to participate in this patients’ care  Please do not hesitate to call with any additional questions    RAFITA العراقي      "

## 2023-05-07 NOTE — PLAN OF CARE
Problem: Potential for Falls  Goal: Patient will remain free of falls  Description: INTERVENTIONS:  - Educate patient/family on patient safety including physical limitations  - Instruct patient to call for assistance with activity   - Consult OT/PT to assist with strengthening/mobility   - Keep Call bell within reach  - Keep bed low and locked with side rails adjusted as appropriate  - Keep care items and personal belongings within reach  - Initiate and maintain comfort rounds  - Make Fall Risk Sign visible to staff  - Offer Toileting every 2 Hours, in advance of need  - Initiate/Maintain bed alarm  - Obtain necessary fall risk management equipment  - Apply yellow socks and bracelet for high fall risk patients  - Consider moving patient to room near nurses station  Outcome: Progressing     Problem: Nutrition/Hydration-ADULT  Goal: Nutrient/Hydration intake appropriate for improving, restoring or maintaining nutritional needs  Description: Monitor and assess patient's nutrition/hydration status for malnutrition  Collaborate with interdisciplinary team and initiate plan and interventions as ordered  Monitor patient's weight and dietary intake as ordered or per policy  Utilize nutrition screening tool and intervene as necessary  Determine patient's food preferences and provide high-protein, high-caloric foods as appropriate       INTERVENTIONS:  - Monitor oral intake, urinary output, labs, and treatment plans  - Assess nutrition and hydration status and recommend course of action  - Evaluate amount of meals eaten  - Assist patient with eating if necessary   - Allow adequate time for meals  - Recommend/ encourage appropriate diets, oral nutritional supplements, and vitamin/mineral supplements  - Order, calculate, and assess calorie counts as needed  - Assess need for intravenous fluids  - Provide nutrition/hydration education as appropriate  - Include patient/family/caregiver in decisions related to nutrition  Outcome: Progressing     Problem: PAIN - ADULT  Goal: Verbalizes/displays adequate comfort level or baseline comfort level  Description: Interventions:  - Encourage patient to monitor pain and request assistance  - Assess pain using appropriate pain scale  - Administer analgesics based on type and severity of pain and evaluate response  - Implement non-pharmacological measures as appropriate and evaluate response  - Consider cultural and social influences on pain and pain management  - Notify physician/advanced practitioner if interventions unsuccessful or patient reports new pain  Outcome: Progressing     Problem: INFECTION - ADULT  Goal: Absence or prevention of progression during hospitalization  Description: INTERVENTIONS:  - Assess and monitor for signs and symptoms of infection  - Monitor lab/diagnostic results  - Monitor all insertion sites, i e  indwelling lines, tubes, and drains  - Monitor endotracheal if appropriate and nasal secretions for changes in amount and color  - [place] appropriate cooling/warming therapies per order  - Administer medications as ordered  - Instruct and encourage patient and family to use good hand hygiene technique  - Identify and instruct in appropriate isolation precautions for identified infection/condition  Outcome: Progressing     Problem: SAFETY ADULT  Goal: Patient will remain free of falls  Description: INTERVENTIONS:  - Educate patient/family on patient safety including physical limitations  - Instruct patient to call for assistance with activity   - Consult OT/PT to assist with strengthening/mobility   - Keep Call bell within reach  - Keep bed low and locked with side rails adjusted as appropriate  - Keep care items and personal belongings within reach  - Initiate and maintain comfort rounds  - Make Fall Risk Sign visible to staff  - Offer Toileting every 2 Hours, in advance of need  - Initiate/Maintain bed alarm  - Obtain necessary fall risk management equipment  - Apply yellow socks and bracelet for high fall risk patients  - Consider moving patient to room near nurses station  Outcome: Progressing     Problem: DISCHARGE PLANNING  Goal: Discharge to home or other facility with appropriate resources  Description: INTERVENTIONS:  - Identify barriers to discharge w/patient and caregiver  - Arrange for needed discharge resources and transportation as appropriate  - Identify discharge learning needs (meds, wound care, etc )  - Arrange for interpretive services to assist at discharge as needed  - Refer to Case Management Department for coordinating discharge planning if the patient needs post-hospital services based on physician/advanced practitioner order or complex needs related to functional status, cognitive ability, or social support system  Outcome: Progressing     Problem: Knowledge Deficit  Goal: Patient/family/caregiver demonstrates understanding of disease process, treatment plan, medications, and discharge instructions  Description: Complete learning assessment and assess knowledge base    Interventions:  - Provide teaching at level of understanding  - Provide teaching via preferred learning methods  Outcome: Progressing     Problem: GENITOURINARY - ADULT  Goal: Maintains or returns to baseline urinary function  Description: INTERVENTIONS:  - Assess urinary function  - Encourage oral fluids to ensure adequate hydration if ordered  - Administer IV fluids as ordered to ensure adequate hydration  - Administer ordered medications as needed  - Offer frequent toileting  - Follow urinary retention protocol if ordered  Outcome: Progressing  Goal: Absence of urinary retention  Description: INTERVENTIONS:  - Assess patient’s ability to void and empty bladder  - Monitor I/O  - Bladder scan as needed  - Discuss with physician/AP medications to alleviate retention as needed  - Discuss catheterization for long term situations as appropriate  Outcome: Progressing  Goal: Urinary catheter remains patent  Description: INTERVENTIONS:  - Assess patency of urinary catheter  - If patient has a chronic manley, consider changing catheter if non-functioning  - Follow guidelines for intermittent irrigation of non-functioning urinary catheter  Outcome: Progressing     Problem: METABOLIC, FLUID AND ELECTROLYTES - ADULT  Goal: Electrolytes maintained within normal limits  Description: INTERVENTIONS:  - Monitor labs and assess patient for signs and symptoms of electrolyte imbalances  - Administer electrolyte replacement as ordered  - Monitor response to electrolyte replacements, including repeat lab results as appropriate  - Instruct patient on fluid and nutrition as appropriate  Outcome: Progressing  Goal: Fluid balance maintained  Description: INTERVENTIONS:  - Monitor labs   - Monitor I/O and WT  - Instruct patient on fluid and nutrition as appropriate  - Assess for signs & symptoms of volume excess or deficit  Outcome: Progressing     Problem: SKIN/TISSUE INTEGRITY - ADULT  Goal: Skin Integrity remains intact(Skin Breakdown Prevention)  Description: Assess:  -Perform Ariel assessment every shift   -Clean and moisturize skin every  -Inspect skin when repositioning, toileting, and assisting with ADLS  -Assess under medical devices   -Assess extremities for adequate circulation and sensation     Bed Management:  -Have minimal linens on bed & keep smooth, unwrinkled  -Change linens as needed when moist or perspiring  -Avoid sitting or lying in one position for more than 2 hours while in bed  -Keep HOB at 30 degrees     Toileting:  -Offer bedside commode  -Assess for incontinence every 2 hrs   -Use incontinent care products after each incontinent episode     Activity:  -Mobilize patient 3 times a day  -Encourage activity and walks on unit  -Encourage or provide ROM exercises   -Turn and reposition patient every 3 Hours  -Use appropriate equipment to lift or move patient in bed  -Instruct/ Assist with weight shifting when out of bed in chair    Skin Care:  -Avoid use of baby powder, tape, friction and shearing, hot water or constrictive clothing  -Do not massage red bony areas    Next Steps:  -Teach patient strategies to minimize risks such as frequent repositioning   Outcome: Progressing  Goal: Incision(s), wounds(s) or drain site(s) healing without S/S of infection  Description: INTERVENTIONS  - Assess and document dressing, incision, wound bed, drain sites and surrounding tissue  - Provide patient and family education  - Perform skin care daily  Outcome: Progressing  Goal: Pressure injury heals and does not worsen  Description: Interventions:  - Implement low air loss mattress or specialty surface (Criteria met)  - Apply silicone foam dressing  - Apply fecal or urinary incontinence containment device   - Perform passive or active ROM every shift  - Turn and reposition patient & offload bony prominences every 2 hours   - Utilize friction reducing device or surface for transfers   - Use incontinent care products after each incontinent episode   - Consider nutrition services referral as needed  Outcome: Progressing     Problem: MOBILITY - ADULT  Goal: Maintain or return to baseline ADL function  Description: INTERVENTIONS:  -  Assess patient's ability to carry out ADLs; assess patient's baseline for ADL function and identify physical deficits which impact ability to perform ADLs (bathing, care of mouth/teeth, toileting, grooming, dressing, etc )  - Assess/evaluate cause of self-care deficits   - Assess range of motion  - Assess patient's mobility; develop plan if impaired  - Assess patient's need for assistive devices and provide as appropriate  - Encourage maximum independence but intervene and supervise when necessary  - Involve family in performance of ADLs  - Assess for home care needs following discharge   - Consider OT consult to assist with ADL evaluation and planning for discharge  - Provide patient education as appropriate  Outcome: Progressing  Goal: Maintains/Returns to pre admission functional level  Description: INTERVENTIONS:  - Perform BMAT or MOVE assessment daily    - Set and communicate daily mobility goal to care team and patient/family/caregiver  - Collaborate with rehabilitation services on mobility goals if consulted  - Perform Range of Motion 3 times a day  - Reposition patient every 2 hours    - Dangle patient 3 times a day  - Stand patient 3 times a day  - Ambulate patient 3 times a day  - Out of bed to chair 3 times a day   - Out of bed for meals 3 times a day  - Out of bed for toileting  - Record patient progress and toleration of activity level   Outcome: Progressing     Problem: Prexisting or High Potential for Compromised Skin Integrity  Goal: Skin integrity is maintained or improved  Description: INTERVENTIONS:  - Identify patients at risk for skin breakdown  - Assess and monitor skin integrity  - Assess and monitor nutrition and hydration status  - Monitor labs   - Assess for incontinence   - Turn and reposition patient  - Assist with mobility/ambulation  - Relieve pressure over bony prominences  - Avoid friction and shearing  - Provide appropriate hygiene as needed including keeping skin clean and dry  - Evaluate need for skin moisturizer/barrier cream  - Collaborate with interdisciplinary team   - Patient/family teaching  - Consider wound care consult   Outcome: Progressing

## 2023-05-07 NOTE — PROGRESS NOTES
[address] [facility] NOTE   Ragini Melendez 74 y o  female MRN: [id number]  Unit/Bed#: S -01 Encounter: [id number]  Reason for Consult: MONROE on CKD    ASSESSMENT and PLAN:    28-year-old female with a past medical history of CKD, nephrotic syndrome, endometrial carcinoma with recent VEGF with history of DVT, gastric bypass, hypertension, who was sent in by nephrologist on 5/4 due to acute kidney injury and severe acidosis     1-acute kidney injury on chronic kidney injury stage III-  - Outpatient nephrologist Dr Shay Otero  - Baseline creatinine 0 9 to 1 1 mg/dL but rising to 1 6 mg/dL in March  - Admission creatinine-  - Renal imaging-renal ultrasound 5/4 with nephroureteral stent in place, mild bilateral hydro with more prominence on the left, left kidney renal parenchyma diffusely echogenic  - CT scan-5/4-right ureteral stent in expected position with resolution of subcapsular collection with no obstructive uropathy   Mild left-sided hydro and hydroureter but no calculus noted   Pyelonephritis not excluded   Circumferential bladder wall thickening   - Urinalysis-innumerable WBC, normal RBC, moderate bacteria most recent stent exchange was in March 2022  - Was noted to have gross hematuria and was started on three-way Sifuentes catheter for irrigation  - Initially started on isotonic bicarbonate fluids and broad-spectrum antibiotics with improvement in renal function and transition to isotonic fluids on 5/5  - 5/6-creatinine improving 3 2 mg/dL  Changed fluids to pH balance fluids     - 5/7- creatinine stable 3 2 mg/dL  Unchanged  Awaiting ultrasound      Overall, patient is clinically improving with volume expansion  Only has Staph aureus in the urine thus far which is known from prior  Therefore if the patient did have a true urinary tract infection, and given hardware, may not have improved as rapidly as the patient was only receiving cefepime    Therefore this may be more volume depletion as the cause of acute kidney injury and may have element of obstructive issue  I have reviewed directly with primary team resident on 5/6     Plan  - I/os; avoid nephrotoxic agents  - Avoid hypotension  - Follow-up final ultrasound results  - Follow-up cryoglobulin, anti-PLA2R antibody, SPEP  - Repeat free light chain level in steady state  - BMP in a m   - Follow-up final cultures  -  Defer to primary team and urology team regarding positive urine culture  From a renal standpoint, I do not believe the patient has a true infection from this as she is colonized with this but does have a stent therefore monitor closely  I discussed with primary team resident regarding this yesterday  Overall, patient's renal function is improving but not yet back to baseline  His underlying nephrotic syndrome also  Await ultrasound to rule out mechanical issue  May have had ATN initially but if does not continue to improve will need continued follow-up and work-up as outlined below     2-nephrotic range proteinuria history     - Last Avastin dose on September 9, 2022  - Recently with proteinuria as high as 17 g/g  - Recent 24-hour urine protein does not appear accurate-rather incomplete collection  - CHERI slightly positive  - Anti-double-stranded DNA negative  - Complements unrevealing  - PLA2R -  - SPEP-no monoclonal band  - Kappa/lambda ratio unrevealing  - Repeat free light chain on 5/4 is 1 8  - Cryoglobulin-  - Repeat SPEP-  - Rheumatoid factor-negative  - Repeat free light chain ratio-  - Has not tolerated lisinopril in the past due to hypotension  - Given obstructive issues with right kidney, patient was felt to be high risk for renal biopsy  - Patient was planned to have a nuclear scan to evaluate split function test as outpatient  - Etiology felt to be in the setting of VEGF inhibitor     3-right-sided hydronephrosis secondary to endometrial carcinoma - which appears improved on CT scan currently and now L hydro   Started on CBI for hematuria initially  Started on broad spectrum antibiotics for cystitis currently? Blood cultures positive for Staph aureus  Has ureteral stent  Deferring to primary team regarding final plans regarding this      - History of prior right nephrostomy tube converted to nephroureteral stent  - concern for cystitis  - f/u final cultures  - etiology of L hydro is unclear thus far       4-acid/base - improving bicarb  With bicarbonate based fluids  Changed to pH balance fluids     5-electrolytes     - Hyperphosphatemia- improving slowly     4-nrbfaz-jhldpzr with oncology team   No KENNEY due to malignancy  Per primary team     7-history of DVT-agree with avoiding Lovenox given current MONROE     8-constipation-per primary team     9-hypertension-initially hypotensive improved with volume expansion       SUBJECTIVE / 24H INTERVAL HISTORY:    Blood pressures 959 systolic  Overall appears to be stabilizing  On room air  Urine output 2 2 L yesterday  Which has overall improved  Denies complaints overall  States is feeling better day by day  No shortness of breath      OBJECTIVE:  Current Weight:    Vitals:    05/06/23 0913 05/06/23 1533 05/06/23 2150 05/07/23 0626   BP: 102/62 105/61 101/59 104/62   BP Location: Left arm  Left arm Left arm   Pulse:  57 62 65   Resp:  20 18 18   Temp:  97 9 °F (36 6 °C) 97 7 °F (36 5 °C) 97 8 °F (36 6 °C)   TempSrc:   Oral Oral   SpO2:  94% 99% 97%   Height:           Intake/Output Summary (Last 24 hours) at 5/7/2023 1155  Last data filed at 5/7/2023 1130  Gross per 24 hour   Intake 2443 33 ml   Output 2275 ml   Net 168 33 ml     General: NAD  Skin: no rash  Eyes: anicteric sclera  ENT: moist mucous membrane  Neck: supple  Chest: CTA b/l, no ronchii, no wheeze, no rubs, no rales  CVS: s1s2, no murmur, no gallop, no rub  Abdomen: soft, no significant tenderness but does have minimal tenderness over Lovenox injection sites, nl sounds  Extremities: no edema LE b/l  : no manley  Neuro: AAOX3  Psych: normal affect    Medications:    Current Facility-Administered Medications:   •  acetaminophen (TYLENOL) tablet 650 mg, 650 mg, Oral, Q4H PRN, Glorya Smoker Nigel, DO  •  ARIPiprazole (ABILIFY) tablet 20 mg, 20 mg, Oral, Daily, Glorya Smoker Nigel, DO, 20 mg at 05/07/23 0813  •  aspirin (ECOTRIN LOW STRENGTH) EC tablet 81 mg, 81 mg, Oral, QPM, Glorya Smoker Nigel, DO, 81 mg at 05/06/23 1812  •  bisacodyl (DULCOLAX) rectal suppository 10 mg, 10 mg, Rectal, Once PRN, May Thu MD Salvador  •  calcium acetate (PHOSLO) capsule 667 mg, 667 mg, Oral, TID With Meals, May Thu Regina Fuentes MD, 667 mg at 05/07/23 8798  •  cefepime (MAXIPIME) 1,000 mg in dextrose 5 % 50 mL IVPB, 1,000 mg, Intravenous, Q24H, May Thu Regina Fuentes MD, Last Rate: 100 mL/hr at 05/06/23 2155, 1,000 mg at 05/06/23 2155  •  docusate sodium (COLACE) capsule 100 mg, 100 mg, Oral, BID, May Thu Regina Fuentes MD, 100 mg at 33/42/76 1812  •  folic acid (FOLVITE) tablet 1 mg, 1 mg, Oral, Daily, Glorya Smoker Nigel, DO, 1 mg at 05/07/23 0813  •  heparin (porcine) subcutaneous injection 5,000 Units, 5,000 Units, Subcutaneous, Q8H [address], 5,000 Units at 05/07/23 0522 **AND** [COMPLETED] Platelet count, , , Once, [employer], DO  •  oxybutynin (DITROPAN-XL) 24 hr tablet 15 mg, 15 mg, Oral, HS, Glorya Smoker Nigel, DO, 15 mg at 05/06/23 2147  •  oxyCODONE (ROXICODONE) IR tablet 5 mg, 5 mg, Oral, Q4H PRN, Glorya Smoker Nigel, DO, 5 mg at 05/07/23 9238  •  oxyCODONE (ROXICODONE) split tablet 2 5 mg, 2 5 mg, Oral, Q4H PRN, Glorya Smoker Nigel, DO  •  pantoprazole (PROTONIX) EC tablet 40 mg, 40 mg, Oral, Early Morning, Glorya Smoker Nigel, DO, 40 mg at 05/07/23 8754  •  polyethylene glycol (MIRALAX) packet 17 g, 17 g, Oral, Daily, Glorya Smoker Nigel, DO, 17 g at 05/07/23 0813  •  saccharomyces boulardii (FLORASTOR) capsule 250 mg, 250 mg, Oral, BID, Glorya Smoker Nigel, DO, 250 mg at 05/07/23 7079  •  venlafaxine [facility]) tablet 75 mg, 75 mg, Oral, TID, Savannah Brian Manning, DO, 75 mg at 05/07/23 0813    Laboratory Results:  Results from last 7 days   Lab Units 05/07/23  0530 05/06/23  0535 05/06/23  0034 05/05/23  1656 05/05/23  0537 05/05/23  0024 05/04/23  2157 05/04/23  1357   WBC Thousand/uL 11 94* 9 82  --   --  13 02*  --   --  13 84*   HEMOGLOBIN g/dL 7 7* 7 9*  --   --  8 0*  --   --  9 4*   HEMATOCRIT % 25 6* 25 6*  --   --  26 1*  --   --  31 4*   PLATELETS Thousands/uL 293 323  --   --  328 292  --  393*   POTASSIUM mmol/L 4 1 4 1 3 9 4 0 3 2*  --  4 6 4 6   CHLORIDE mmol/L 106 108 105 102 104  --  109* 103   CO2 mmol/L 24 21 21 22 19*  --  11* 12*   BUN mg/dL 58* 65* 69* 71* 78*  --  87* 89*   CREATININE mg/dL 3 15* 3 15* 3 17* 3 48* 3 60*  --  4 10* 4 26*   CALCIUM mg/dL 8 6 8 5 8 5 8 3* 8 2*  --  8 7 9 2   MAGNESIUM mg/dL  --  2 2  --  2 4 1 5*  --   --   --    PHOSPHORUS mg/dL  --  5 2*  --  5 2* 6 8*  --   --  8 4*

## 2023-05-07 NOTE — PROGRESS NOTES
[facility]  Progress Note  Name: Ck Chavis  MRN: [id number]  Unit/Bed#: S -05 I Date of Admission: 5/4/2023   Date of Service: 5/7/2023 I Hospital Day: 3    Assessment/Plan   History of DVT (deep vein thrombosis)  Assessment & Plan  - Patient with DVT in 2017  - Currently on Lovenox 80 mg daily  VAS US-negative    Plan  - Hold lovenox in setting of MONROE  -Continue heparin DVT prophylaxis    Hyperphosphatemia  Assessment & Plan  - Phosphate of 8 4 on admission  - In setting of acute on chronic disease     Plan  -Continue PhosLo   - recheck phosphate     Constipation  Assessment & Plan  - Sever constipation noted on CT abdomen  - Patient reports no BM in over a week  - Patient without any abdominal pain/tenderness or distention     Plan-  Resume regular diet  Bisacodyl suppository   If no resolution will order enema    Severe protein-calorie malnutrition ([facility] [address] )  Assessment & Plan  Malnutrition Findings:   Adult Malnutrition type: Chronic illness  Adult Degree of Malnutrition: Other severe protein calorie malnutrition  Malnutrition Characteristics: Muscle loss, Fat loss                  360 Statement: inadequate intake related to poor appetite as evidenced by dark sunken orbital, wasted temporalis, buccal pads, hollow supraclavicular space, wasted interosseous  Int: Advance diet to regular, as medically indicated,  medical food supplements  BMI Findings: Body mass index is 23 43 kg/m²  Acute on chronic kidney disease  Assessment & Plan  -  Baseline Cr: 0 9-1 1  - Prior CDK3a now 5   - Suspect 2/2 to UTI  Estimated Creatinine Clearance: 13 7 mL/min (A) (by C-G formula based on SCr of 3 15 mg/dL (H))    Cr-3 17    Plan  Avoid nephrotoxic drugs  Avoid hypotension  Monitor BMP    Endometrial cancer [facility])  Assessment & Plan  - Diagnosis '14; s/p total abdominal hysterectomy and bilateral salpingo oophorectomy   - Status post ENDOBARR (rucaparib, atezolizumab, and bevacizumab) clinical trial started in 12/2020 and finished 12/2022  - She is currently under active surveillance    * Hemorrhagic cystitis w/ pyelonephritis  Assessment & Plan  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  - On arrival patient hemodynamically stable, blood work showing leukocytosis (13 84), mild hyponatremia (132), anion gap (17) metabolic acidosis (3 405/34 8/36 3/8 1), MONROE (Cr 4 26; baseline 0 9 - 1 1)  - UA revealing innumerable WBC, RBC and occasional bacteria  - Urine grossly bloody requiring 3 way catheter for continuous irrigation as manley was clotting   - CT abd revealing no obstructive uropathy, mild left hydronephrosis and hydrorureter (pyelonephritis not excluded), persistent circumferential bladder wall thickening possibly indicating cystitis as well as severe constipation   - Physical exam notable for ecchymosis around the lower abdomen from patients home lovenox; denies any abdominal pain, dysuria, N/V, fever, chills  - Patient complaining of ongoing decreased appetite for last week and a half, fatigue   Also reports not having a BM for > 1 week  - Patient has history of UTI's last one in March, positive cultures for pseudomonas in '20   - Suspect bleed 2/2 to cystitis causing MONROE possibly resulting in super therapeutic dose of lovenox   No longer on CBI  Completed sodium bicarb, IVF  cc for 6 hours    Plan  Appreciate nephrology recommendations  Continue cefepime  UA culture-Staph aureus, awaiting sensitivities   Voiding trial today, remove Sifuentes  Urinary retention protocol  Consider urology consult if patient fails voiding trial  Follow-up US Kidney Bladder report    Metabolic acidosis, increased anion gap-resolved as of 2023  Assessment & Plan  - On arrival  2 (VBG)  - Anion gap of 17   - Serum bicarb of 11  - Suspect 2/2 to renal failure due to infection   S/p - Sodium bicarb drip and IVF  mL/hr     Plan  Monitor BMP    Hyponatremia-resolved as of 2023  Assessment & Plan  - Corrected from 132 to 135 during admission  In a 137    Plan  -BMP             VTE Pharmacologic Prophylaxis: VTE Score: 6 High Risk (Score >/= 5) - Pharmacological DVT Prophylaxis Ordered: heparin  Sequential Compression Devices Ordered  Patient Centered Rounds: I performed bedside rounds with nursing staff today  Discussions with Specialists or Other Care Team Provider:     Education and Discussions with Family / Patient: Updated  (sister) via phone  No voicemail  Current Length of Stay: 3 day(s)  Current Patient Status: Inpatient   Discharge Plan: Anticipate discharge in 24-48 hrs to home with home services  Code Status: Level 1 - Full Code    Subjective:   No significant overnight event  Sifuentes is out yesterday  Urine is john-colored  Patient said that she could not sleep last night because she slept in the daytime  Recommended to close the door at nighttime to help sleeping  Objective:     Vitals:   Temp (24hrs), Av 8 °F (36 6 °C), Min:97 7 °F (36 5 °C), Max:97 9 °F (36 6 °C)    Temp:  [97 7 °F (36 5 °C)-97 9 °F (36 6 °C)] 97 7 °F (36 5 °C)  HR:  [57-65] 65  Resp:  [17-20] 17  BP: (101-105)/(59-65) 104/65  SpO2:  [94 %-99 %] 97 %  Body mass index is 23 43 kg/m²  Input and Output Summary (last 24 hours):      Intake/Output Summary (Last 24 hours) at 2023 1523  Last data filed at 2023 1130  Gross per 24 hour Intake 2323 33 ml   Output 1950 ml   Net 373 33 ml       Physical Exam:   Physical Exam  Vitals and nursing note reviewed  Constitutional:       General: She is not in acute distress  Appearance: She is well-developed  HENT:      Head: Normocephalic and atraumatic  Eyes:      Extraocular Movements: Extraocular movements intact  Conjunctiva/sclera: Conjunctivae normal       Pupils: Pupils are equal, round, and reactive to light  Cardiovascular:      Rate and Rhythm: Normal rate and regular rhythm  Heart sounds: No murmur heard  Pulmonary:      Effort: Pulmonary effort is normal  No respiratory distress  Breath sounds: Normal breath sounds  No wheezing or rales  Abdominal:      General: There is no distension  Palpations: Abdomen is soft  Tenderness: There is no abdominal tenderness  Musculoskeletal:         General: No swelling  Cervical back: Neck supple  Right lower leg: No edema  Skin:     General: Skin is warm and dry  Capillary Refill: Capillary refill takes less than 2 seconds  Coloration: Skin is not jaundiced or pale  Findings: No bruising, erythema, lesion or rash  Neurological:      Mental Status: She is alert and oriented to person, place, and time  Psychiatric:         Mood and Affect: Mood normal           Additional Data:     Labs:  Results from last 7 days   Lab Units 05/07/23  0530 05/06/23  0535 05/05/23  0537   WBC Thousand/uL 11 94*   < > 13 02*   HEMOGLOBIN g/dL 7 7*   < > 8 0*   HEMATOCRIT % 25 6*   < > 26 1*   PLATELETS Thousands/uL 293   < > 328   NEUTROS PCT %  --   --  85*   LYMPHS PCT %  --   --  5*   MONOS PCT %  --   --  8   EOS PCT %  --   --  0    < > = values in this interval not displayed       Results from last 7 days   Lab Units 05/07/23 0530 05/06/23  0535   SODIUM mmol/L 140 139   POTASSIUM mmol/L 4 1 4 1   CHLORIDE mmol/L 106 108   CO2 mmol/L 24 21   BUN mg/dL 58* 65*   CREATININE mg/dL 3 15* 3 15*   ANION GAP mmol/L 10 10   CALCIUM mg/dL 8 6 8 5   ALBUMIN g/dL  --  2 9*   TOTAL BILIRUBIN mg/dL  --  0 29   ALK PHOS U/L  --  90   ALT U/L  --  5*   AST U/L  --  6*   GLUCOSE RANDOM mg/dL 84 85                       Lines/Drains:  Invasive Devices     Central Venous Catheter Line  Duration           Port A Cath 11/12/19 Left Chest 1272 days          Peripheral Intravenous Line  Duration           Peripheral IV 05/04/23 Distal;Dorsal (posterior); Left Forearm 2 days    Peripheral IV 05/04/23 Right;Ventral (anterior) Antecubital 2 days          Drain  Duration           Ureteral Internal Stent Right ureter 6 Fr  38 days                Central Line:  Goal for removal: Continue central line             Imaging: Reviewed radiology reports from this admission including: ultrasound(s)   VAS lower limb venous duplex study, complete bilateral   Final Result by Judit Conner MD (05/05 1605)      CT abdomen pelvis wo contrast   Final Result by Gumaro Self MD (05/04 2143)         1  Right ureteral stent in expected position  Resolution of subcapsular collection  No obstructive uropathy  2  Mild left hydronephrosis and hydroureter  No obstructing calculus  Pyelonephritis not excluded  3  Persistent circumferential bladder wall thickening could indicate cystitis  4  Severe constipation  Workstation performed: [id number]         US kidney and bladder with pvr   Final Result by Arnulfo Guzman MD (05/04 1952)      The nephroureteral stent is in place  Mild bilateral hydronephrosis more prominent on the left  Chronic circumferential bladder wall thickening  Workstation performed: [id number]         US kidney and bladder    (Results Pending)       Recent Cultures (last 7 days):   Results from last 7 days   Lab Units 05/04/23 2157 05/04/23 2156 05/04/23 2127   BLOOD CULTURE  No Growth at 48 hrs  No Growth at 48 hrs    --    URINE CULTURE   --   --  >100,000 cfu/ml Staphylococcus aureus*  >100,000 cfu/ml Staphylococcus aureus*       Last 24 Hours Medication List:   Current Facility-Administered Medications   Medication Dose Route Frequency Provider Last Rate   • acetaminophen  650 mg Oral Q4H PRN Dimitrychase Manning DO     • ARIPiprazole  20 mg Oral Daily [facility] danay connelly, DO     • aspirin  81 mg Oral QPM [facility] danay connelly, DO     • bisacodyl  10 mg Rectal Once PRN May u Herminio Cheng MD     • calcium acetate  667 mg Oral TID With Meals May Thu Herminio Cheng MD     • cefepime  1,000 mg Intravenous Q24H May u Herminio Cheng MD 1,000 mg (05/06/23 3857)   • docusate sodium  100 mg Oral BID May u Herminio Cheng MD     • folic acid  1 mg Oral Daily Sydney Meyer DO     • heparin (porcine)  5,000 Units Subcutaneous [facility] danay connelly, DO     • oxybutynin  15 mg Oral HS [facility] danay wood, DO     • oxyCODONE  5 mg Oral Q4H PRN [facility] Shelley Manning DO     • oxyCODONE  2 5 mg Oral Q4H PRN [facility] Shelley Manning DO     • pantoprazole  40 mg Oral Early Morning [facility] Shelley Manning DO     • polyethylene glycol  17 g Oral Daily [facility] Cornelialandivon Manning, DO     • saccharomyces boulardii  250 mg Oral BID [facility] danay connelly, DO     • venlafaxine  75 mg Oral TID Sydney Meyer DO          Today, Patient Was Seen By: May u Herminio Cheng MD    **Please Note: This note may have been constructed using a voice recognition system  **

## 2023-05-08 LAB
ANION GAP SERPL CALCULATED.3IONS-SCNC: 11 MMOL/L (ref 4–13)
BUN SERPL-MCNC: 53 MG/DL (ref 5–25)
CALCIUM SERPL-MCNC: 8.6 MG/DL (ref 8.4–10.2)
CHLORIDE SERPL-SCNC: 106 MMOL/L (ref 96–108)
CO2 SERPL-SCNC: 21 MMOL/L (ref 21–32)
CREAT SERPL-MCNC: 3.2 MG/DL (ref 0.6–1.3)
CRYOGLOB SER QL 1D COLD INC: NORMAL
ERYTHROCYTE [DISTWIDTH] IN BLOOD BY AUTOMATED COUNT: 14.6 % (ref 11.6–15.1)
GFR SERPL CREATININE-BSD FRML MDRD: 14 ML/MIN/1.73SQ M
GLUCOSE SERPL-MCNC: 86 MG/DL (ref 65–140)
HCT VFR BLD AUTO: 24.5 % (ref 34.8–46.1)
HGB BLD-MCNC: 7.4 G/DL (ref 11.5–15.4)
INR PPP: 1.03 (ref 0.84–1.19)
MCH RBC QN AUTO: 28.5 PG (ref 26.8–34.3)
MCHC RBC AUTO-ENTMCNC: 30.2 G/DL (ref 31.4–37.4)
MCV RBC AUTO: 94 FL (ref 82–98)
PHOSPHATE SERPL-MCNC: 5.1 MG/DL (ref 2.3–4.1)
PLA2R IGG SER IA-ACNC: <1.8 RU/ML (ref 0–19.9)
PLATELET # BLD AUTO: 292 THOUSANDS/UL (ref 149–390)
PMV BLD AUTO: 10.4 FL (ref 8.9–12.7)
POTASSIUM SERPL-SCNC: 4.9 MMOL/L (ref 3.5–5.3)
PROTHROMBIN TIME: 13.7 SECONDS (ref 11.6–14.5)
RBC # BLD AUTO: 2.6 MILLION/UL (ref 3.81–5.12)
SODIUM SERPL-SCNC: 138 MMOL/L (ref 135–147)
WBC # BLD AUTO: 12.01 THOUSAND/UL (ref 4.31–10.16)

## 2023-05-08 RX ADMIN — FOLIC ACID 1 MG: 1 TABLET ORAL at 09:00

## 2023-05-08 RX ADMIN — VENLAFAXINE 75 MG: 37.5 TABLET ORAL at 22:33

## 2023-05-08 RX ADMIN — VENLAFAXINE 75 MG: 37.5 TABLET ORAL at 09:00

## 2023-05-08 RX ADMIN — CALCIUM ACETATE 667 MG: 667 CAPSULE ORAL at 13:04

## 2023-05-08 RX ADMIN — CALCIUM ACETATE 667 MG: 667 CAPSULE ORAL at 18:23

## 2023-05-08 RX ADMIN — CEFEPIME 1000 MG: 1 INJECTION, POWDER, FOR SOLUTION INTRAMUSCULAR; INTRAVENOUS at 22:34

## 2023-05-08 RX ADMIN — DOCUSATE SODIUM 100 MG: 100 CAPSULE, LIQUID FILLED ORAL at 09:00

## 2023-05-08 RX ADMIN — DOCUSATE SODIUM 100 MG: 100 CAPSULE, LIQUID FILLED ORAL at 18:23

## 2023-05-08 RX ADMIN — Medication 250 MG: at 09:00

## 2023-05-08 RX ADMIN — Medication 250 MG: at 18:23

## 2023-05-08 RX ADMIN — OXYBUTYNIN CHLORIDE 15 MG: 5 TABLET, EXTENDED RELEASE ORAL at 22:33

## 2023-05-08 RX ADMIN — ARIPIPRAZOLE 20 MG: 5 TABLET ORAL at 09:00

## 2023-05-08 RX ADMIN — VENLAFAXINE 75 MG: 37.5 TABLET ORAL at 18:23

## 2023-05-08 RX ADMIN — PANTOPRAZOLE SODIUM 40 MG: 40 TABLET, DELAYED RELEASE ORAL at 05:15

## 2023-05-08 NOTE — PLAN OF CARE
Problem: Potential for Falls  Goal: Patient will remain free of falls  Description: INTERVENTIONS:  - Educate patient/family on patient safety including physical limitations  - Instruct patient to call for assistance with activity   - Consult OT/PT to assist with strengthening/mobility   - Keep Call bell within reach  - Keep bed low and locked with side rails adjusted as appropriate  - Keep care items and personal belongings within reach  - Initiate and maintain comfort rounds  - Make Fall Risk Sign visible to staff  - Offer Toileting every 2 Hours, in advance of need  - Initiate/Maintain bed alarm  - Obtain necessary fall risk management equipment  - Apply yellow socks and bracelet for high fall risk patients  - Consider moving patient to room near nurses station  Outcome: Progressing     Problem: Nutrition/Hydration-ADULT  Goal: Nutrient/Hydration intake appropriate for improving, restoring or maintaining nutritional needs  Description: Monitor and assess patient's nutrition/hydration status for malnutrition  Collaborate with interdisciplinary team and initiate plan and interventions as ordered  Monitor patient's weight and dietary intake as ordered or per policy  Utilize nutrition screening tool and intervene as necessary  Determine patient's food preferences and provide high-protein, high-caloric foods as appropriate       INTERVENTIONS:  - Monitor oral intake, urinary output, labs, and treatment plans  - Assess nutrition and hydration status and recommend course of action  - Evaluate amount of meals eaten  - Assist patient with eating if necessary   - Allow adequate time for meals  - Recommend/ encourage appropriate diets, oral nutritional supplements, and vitamin/mineral supplements  - Order, calculate, and assess calorie counts as needed  - Assess need for intravenous fluids  - Provide nutrition/hydration education as appropriate  - Include patient/family/caregiver in decisions related to nutrition  Outcome: Progressing     Problem: PAIN - ADULT  Goal: Verbalizes/displays adequate comfort level or baseline comfort level  Description: Interventions:  - Encourage patient to monitor pain and request assistance  - Assess pain using appropriate pain scale  - Administer analgesics based on type and severity of pain and evaluate response  - Implement non-pharmacological measures as appropriate and evaluate response  - Consider cultural and social influences on pain and pain management  - Notify physician/advanced practitioner if interventions unsuccessful or patient reports new pain  Outcome: Progressing     Problem: INFECTION - ADULT  Goal: Absence or prevention of progression during hospitalization  Description: INTERVENTIONS:  - Assess and monitor for signs and symptoms of infection  - Monitor lab/diagnostic results  - Monitor all insertion sites, i e  indwelling lines, tubes, and drains  - Monitor endotracheal if appropriate and nasal secretions for changes in amount and color  - [place] appropriate cooling/warming therapies per order  - Administer medications as ordered  - Instruct and encourage patient and family to use good hand hygiene technique  - Identify and instruct in appropriate isolation precautions for identified infection/condition  Outcome: Progressing     Problem: SAFETY ADULT  Goal: Patient will remain free of falls  Description: INTERVENTIONS:  - Educate patient/family on patient safety including physical limitations  - Instruct patient to call for assistance with activity   - Consult OT/PT to assist with strengthening/mobility   - Keep Call bell within reach  - Keep bed low and locked with side rails adjusted as appropriate  - Keep care items and personal belongings within reach  - Initiate and maintain comfort rounds  - Make Fall Risk Sign visible to staff  - Offer Toileting every 2 Hours, in advance of need  - Initiate/Maintain bed alarm  - Obtain necessary fall risk management equipment  - Apply yellow socks and bracelet for high fall risk patients  - Consider moving patient to room near nurses station  Outcome: Progressing     Problem: DISCHARGE PLANNING  Goal: Discharge to home or other facility with appropriate resources  Description: INTERVENTIONS:  - Identify barriers to discharge w/patient and caregiver  - Arrange for needed discharge resources and transportation as appropriate  - Identify discharge learning needs (meds, wound care, etc )  - Arrange for interpretive services to assist at discharge as needed  - Refer to Case Management Department for coordinating discharge planning if the patient needs post-hospital services based on physician/advanced practitioner order or complex needs related to functional status, cognitive ability, or social support system  Outcome: Progressing     Problem: Knowledge Deficit  Goal: Patient/family/caregiver demonstrates understanding of disease process, treatment plan, medications, and discharge instructions  Description: Complete learning assessment and assess knowledge base    Interventions:  - Provide teaching at level of understanding  - Provide teaching via preferred learning methods  Outcome: Progressing     Problem: GENITOURINARY - ADULT  Goal: Maintains or returns to baseline urinary function  Description: INTERVENTIONS:  - Assess urinary function  - Encourage oral fluids to ensure adequate hydration if ordered  - Administer IV fluids as ordered to ensure adequate hydration  - Administer ordered medications as needed  - Offer frequent toileting  - Follow urinary retention protocol if ordered  Outcome: Progressing  Goal: Absence of urinary retention  Description: INTERVENTIONS:  - Assess patient’s ability to void and empty bladder  - Monitor I/O  - Bladder scan as needed  - Discuss with physician/AP medications to alleviate retention as needed  - Discuss catheterization for long term situations as appropriate  Outcome: Progressing  Goal: Urinary catheter remains patent  Description: INTERVENTIONS:  - Assess patency of urinary catheter  - If patient has a chronic manley, consider changing catheter if non-functioning  - Follow guidelines for intermittent irrigation of non-functioning urinary catheter  Outcome: Progressing     Problem: METABOLIC, FLUID AND ELECTROLYTES - ADULT  Goal: Electrolytes maintained within normal limits  Description: INTERVENTIONS:  - Monitor labs and assess patient for signs and symptoms of electrolyte imbalances  - Administer electrolyte replacement as ordered  - Monitor response to electrolyte replacements, including repeat lab results as appropriate  - Instruct patient on fluid and nutrition as appropriate  Outcome: Progressing  Goal: Fluid balance maintained  Description: INTERVENTIONS:  - Monitor labs   - Monitor I/O and WT  - Instruct patient on fluid and nutrition as appropriate  - Assess for signs & symptoms of volume excess or deficit  Outcome: Progressing     Problem: SKIN/TISSUE INTEGRITY - ADULT  Goal: Skin Integrity remains intact(Skin Breakdown Prevention)  Description: Assess:  -Perform Ariel assessment every shift  -Clean and moisturize skin daily and as needed for soilage  -Inspect skin when repositioning, toileting, and assisting with ADLS  -Assess under medical devices   -Assess extremities for adequate circulation and sensation     Bed Management:  -Have minimal linens on bed & keep smooth, unwrinkled  -Change linens as needed when moist or perspiring  -Avoid sitting or lying in one position for more than 2 hours while in bed  -Keep HOB at 30 degrees     Toileting:  -Offer bedside commode  -Assess for incontinence every 2 hour  -Use incontinent care products after each incontinent episode     Activity:  -Mobilize patient 3 times a day  -Encourage activity and walks on unit  -Encourage or provide ROM exercises   -Turn and reposition patient every 2 Hours  -Use appropriate equipment to lift or move patient in bed    Skin Care:  -Avoid use of baby powder, tape, friction and shearing, hot water or constrictive clothing  -Relieve pressure over bony prominences using foam wedges/pillow support  -Do not massage red bony areas    Next Steps:  -Teach patient strategies to minimize risks such as frequent repositioning   Outcome: Progressing  Goal: Incision(s), wounds(s) or drain site(s) healing without S/S of infection  Description: INTERVENTIONS  - Assess and document dressing, incision, wound bed, drain sites and surrounding tissue  - Provide patient and family education  - Perform skin care daily   Outcome: Progressing  Goal: Pressure injury heals and does not worsen  Description: Interventions:  - Implement low air loss mattress or specialty surface (Criteria met)  - Apply silicone foam dressing  - Apply fecal or urinary incontinence containment device   - Perform passive or active ROM every shift  - Turn and reposition patient & offload bony prominences every 2 hours   - Utilize friction reducing device or surface for transfers  - Consider nutrition services referral as needed  Outcome: Progressing     Problem: MOBILITY - ADULT  Goal: Maintain or return to baseline ADL function  Description: INTERVENTIONS:  -  Assess patient's ability to carry out ADLs; assess patient's baseline for ADL function and identify physical deficits which impact ability to perform ADLs (bathing, care of mouth/teeth, toileting, grooming, dressing, etc )  - Assess/evaluate cause of self-care deficits   - Assess range of motion  - Assess patient's mobility; develop plan if impaired  - Assess patient's need for assistive devices and provide as appropriate  - Encourage maximum independence but intervene and supervise when necessary  - Involve family in performance of ADLs  - Assess for home care needs following discharge   - Consider OT consult to assist with ADL evaluation and planning for discharge  - Provide patient education as appropriate  Outcome: Progressing  Goal: Maintains/Returns to pre admission functional level  Description: INTERVENTIONS:  - Perform BMAT or MOVE assessment daily    - Set and communicate daily mobility goal to care team and patient/family/caregiver  - Collaborate with rehabilitation services on mobility goals if consulted  - Perform Range of Motion 3 times a day  - Reposition patient every 2 hours    - Dangle patient 3 times a day  - Stand patient 3 times a day  - Ambulate patient 3 times a day  - Out of bed to chair 3 times a day   - Out of bed for meals 3 times a day  - Out of bed for toileting  - Record patient progress and toleration of activity level   Outcome: Progressing     Problem: Prexisting or High Potential for Compromised Skin Integrity  Goal: Skin integrity is maintained or improved  Description: INTERVENTIONS:  - Identify patients at risk for skin breakdown  - Assess and monitor skin integrity  - Assess and monitor nutrition and hydration status  - Monitor labs   - Assess for incontinence   - Turn and reposition patient  - Assist with mobility/ambulation  - Relieve pressure over bony prominences  - Avoid friction and shearing  - Provide appropriate hygiene as needed including keeping skin clean and dry  - Evaluate need for skin moisturizer/barrier cream  - Collaborate with interdisciplinary team   - Patient/family teaching  - Consider wound care consult   Outcome: Progressing

## 2023-05-08 NOTE — ASSESSMENT & PLAN NOTE
- Sever constipation noted on CT abdomen  - Patient reports no BM in over a week  - Patient without any abdominal pain/tenderness or distention     Plan-  Resume regular diet, npo at midnight   Bisacodyl suppository

## 2023-05-08 NOTE — PROGRESS NOTES
[facility]  Progress Note  Name: Patience Villagran  MRN: [id number]  Unit/Bed#: S -73 I Date of Admission: 5/4/2023   Date of Service: 5/8/2023 I Hospital Day: 4    Assessment/Plan   * Hemorrhagic cystitis w/ pyelonephritis  Assessment & Plan  - Patient sent to hospital by Nephro for MONROE and metabolic acidosis discovered on recent labs - followed by Nephrology for chronic cystitis, kidney disease w/ proteinuria (HTN, obstructive uropathy, underlying glomerular pathology, age-related nephron loss pathology)  - Recent nephroureteral stent exchange by Urology in March; right sided nephroureteral stent placement 2/2 to hydronephrosis from endometrial carcinoma  - UA revealing innumerable WBC, RBC and occasional bacteria  - Urine grossly bloody requiring 3 way catheter for continuous irrigation as manley was clotting   - CT abd revealing no obstructive uropathy, mild left hydronephrosis and hydrorureter (pyelonephritis not excluded), persistent circumferential bladder wall thickening possibly indicating cystitis as well as severe constipation     - Suspect bleed 2/2 to cystitis causing MONROE possibly resulting in super therapeutic dose of lovenox       Plan  Appreciate nephrology recommendations- Treatment for urinary tract infection which does not appear to be pyelonephritis but would need to make sure she could have a biopsy with this ongoing, Creatinine plateau    Continue cefepime for now   UA culture-Staph aureus  Urinary retention protocol  Renal US-  Appreciate Urology recommendations- Spoke with patient about bilateral nephrostomy tube placement, will reach out to IR tomorrow for consult, n p o  at midnight    Acute on chronic kidney disease  Assessment & Plan  -  Baseline Cr: 0 9-1 1  - Prior CDK3a now 5   - Suspect 2/2 to UTI  Estimated Creatinine Clearance: 13 5 mL/min (A) (by C-G formula based on SCr of 3 2 mg/dL (H))    Cr-3 20    Plan  Avoid nephrotoxic drugs  Avoid hypotension  Monitor BMP    History of DVT (deep vein thrombosis)  Assessment & Plan  - Patient with DVT in 2017  - Currently on Lovenox 80 mg daily  VAS US-negative    Plan  Continue heparin DVT prophylaxis    Hyperphosphatemia  Assessment & Plan  - Phosphate of 8 4 on admission  - In setting of acute on chronic disease     Plan  -Continue PhosLo     Constipation  Assessment & Plan  - Sever constipation noted on CT abdomen  - Patient reports no BM in over a week  - Patient without any abdominal pain/tenderness or distention     Plan-  Resume regular diet, npo at midnight   Bisacodyl suppository     Severe protein-calorie malnutrition (HCC)  Assessment & Plan  Malnutrition Findings:   Adult Malnutrition type: Chronic illness  Adult Degree of Malnutrition: Other severe protein calorie malnutrition  Malnutrition Characteristics: Muscle loss, Fat loss                  360 Statement: inadequate intake related to poor appetite as evidenced by dark sunken orbital, wasted temporalis, buccal pads, hollow supraclavicular space, wasted interosseous  Int: Advance diet to regular, as medically indicated,  medical food supplements  BMI Findings: Body mass index is 23 43 kg/m²  Endometrial cancer [facility])  Assessment & Plan  - Diagnosis '14; s/p total abdominal hysterectomy and bilateral salpingo oophorectomy   - Status post ENDOBARR (rucaparib, atezolizumab, and bevacizumab) clinical trial started in 12/2020 and finished 12/2022  - She is currently under active surveillance             VTE Pharmacologic Prophylaxis: VTE Score: 6 High Risk (Score >/= 5) - Pharmacological DVT Prophylaxis Ordered: heparin  Sequential Compression Devices Ordered  Patient Centered Rounds: I performed bedside rounds with nursing staff today  Discussions with Specialists or Other Care Team Provider: Nephrology, Urology    Education and Discussions with Family / Patient: Updated  (sister) via phone      Current Length of Stay: 4 day(s)  Current Patient Status: Inpatient   Discharge Plan: Anticipate discharge in 48 hrs to discharge location to be determined pending rehab evaluations  Code Status: Level 1 - Full Code    Subjective:   Patient was seen and examined at bedside  Patient is resting comfortably no acute distress  Patient reports she feels well and has no complaints at this time  Patient denies fever, chills, chest pain, shortness of breath, palpitations, abdominal pain, urinary symptoms, hematuria, urinary retention, abdominal pain, NVD, leg pain, or any other symptoms at this time  Objective:     Vitals:   Temp (24hrs), Av 7 °F (36 5 °C), Min:97 7 °F (36 5 °C), Max:97 7 °F (36 5 °C)    Temp:  [97 7 °F (36 5 °C)] 97 7 °F (36 5 °C)  HR:  [72-79] 72  Resp:  [16-17] 16  BP: ()/(62-65) 95/62  SpO2:  [99 %-100 %] 99 %  Body mass index is 23 43 kg/m²  Input and Output Summary (last 24 hours): Intake/Output Summary (Last 24 hours) at 2023 1417  Last data filed at 2023 6999  Gross per 24 hour   Intake --   Output 2600 ml   Net -2600 ml       Physical Exam:   Physical Exam  Vitals and nursing note reviewed  Constitutional:       General: She is not in acute distress  Appearance: She is well-developed  She is ill-appearing  She is not toxic-appearing or diaphoretic  HENT:      Head: Normocephalic and atraumatic  Mouth/Throat:      Mouth: Mucous membranes are moist    Eyes:      Extraocular Movements: Extraocular movements intact  Conjunctiva/sclera: Conjunctivae normal       Pupils: Pupils are equal, round, and reactive to light  Cardiovascular:      Rate and Rhythm: Normal rate and regular rhythm  Pulses: Normal pulses  Heart sounds: Normal heart sounds  No murmur heard  No friction rub  No gallop  Pulmonary:      Effort: Pulmonary effort is normal  No respiratory distress  Breath sounds: Normal breath sounds  No wheezing or rhonchi     Chest:      Chest wall: No tenderness  Abdominal:      General: Bowel sounds are normal       Palpations: Abdomen is soft  Tenderness: There is no abdominal tenderness  There is no right CVA tenderness, left CVA tenderness, guarding or rebound  Musculoskeletal:         General: No tenderness  Normal range of motion  Cervical back: Neck supple  Right lower leg: No edema  Left lower leg: No edema  Skin:     General: Skin is warm and dry  Capillary Refill: Capillary refill takes less than 2 seconds  Neurological:      General: No focal deficit present  Mental Status: She is alert and oriented to person, place, and time  Cranial Nerves: No cranial nerve deficit  Sensory: No sensory deficit  Motor: No weakness  Coordination: Coordination normal    Psychiatric:         Mood and Affect: Mood normal          Behavior: Behavior normal          Thought Content: Thought content normal           Additional Data:     Labs:  Results from last 7 days   Lab Units 05/08/23 0515 05/06/23  0535 05/05/23  0537   WBC Thousand/uL 12 01*   < > 13 02*   HEMOGLOBIN g/dL 7 4*   < > 8 0*   HEMATOCRIT % 24 5*   < > 26 1*   PLATELETS Thousands/uL 292   < > 328   NEUTROS PCT %  --   --  85*   LYMPHS PCT %  --   --  5*   MONOS PCT %  --   --  8   EOS PCT %  --   --  0    < > = values in this interval not displayed  Results from last 7 days   Lab Units 05/08/23 0515 05/07/23  0530 05/06/23  0535   SODIUM mmol/L 138   < > 139   POTASSIUM mmol/L 4 9   < > 4 1   CHLORIDE mmol/L 106   < > 108   CO2 mmol/L 21   < > 21   BUN mg/dL 53*   < > 65*   CREATININE mg/dL 3 20*   < > 3 15*   ANION GAP mmol/L 11   < > 10   CALCIUM mg/dL 8 6   < > 8 5   ALBUMIN g/dL  --   --  2 9*   TOTAL BILIRUBIN mg/dL  --   --  0 29   ALK PHOS U/L  --   --  90   ALT U/L  --   --  5*   AST U/L  --   --  6*   GLUCOSE RANDOM mg/dL 86   < > 85    < > = values in this interval not displayed       Results from last 7 days   Lab Units 05/08/23  0515   INR  1 03                   Lines/Drains:  Invasive Devices     Central Venous Catheter Line  Duration           Port A Cath 11/12/19 Left Chest 1273 days          Peripheral Intravenous Line  Duration           Peripheral IV 05/08/23 Right Forearm <1 day          Drain  Duration           Ureteral Internal Stent Right ureter 6 Fr  39 days                Central Line:  Goal for removal: N/A - Chronic PICC             Imaging: Reviewed radiology reports from this admission including: abdominal/pelvic CT and ultrasound(s)    Recent Cultures (last 7 days):   Results from last 7 days   Lab Units 05/04/23 2157 05/04/23 2156 05/04/23 2127   BLOOD CULTURE  No Growth at 72 hrs   No Growth at 72 hrs   --    URINE CULTURE   --   --  >100,000 cfu/ml Staphylococcus aureus*  >100,000 cfu/ml Staphylococcus aureus*       Last 24 Hours Medication List:   Current Facility-Administered Medications   Medication Dose Route Frequency Provider Last Rate   • acetaminophen  650 mg Oral Q4H PRN Scott Adjutant Nigel, DO     • ARIPiprazole  20 mg Oral Daily Scott Adjutant Jay connelly DO     • bisacodyl  10 mg Rectal Once PRN May Connie Murphy MD     • calcium acetate  667 mg Oral TID With Meals May Connie Franco MD     • cefepime  1,000 mg Intravenous Q24H May Connie Murphy MD 1,000 mg (05/07/23 2157)   • docusate sodium  100 mg Oral BID May Connie Murphy MD     • folic acid  1 mg Oral Daily Everlene Locks, DO     • oxybutynin  15 mg Oral HS Everlene Locks, DO     • oxyCODONE  5 mg Oral Q4H PRN Scott Adjutant Nigel, DO     • oxyCODONE  2 5 mg Oral Q4H PRN Scott Adjutant Nigel, DO     • pantoprazole  40 mg Oral Early Morning Scott Adjutant Nigel, DO     • polyethylene glycol  17 g Oral Daily Scott Adjutant Nigel, DO     • saccharomyces boulardii  250 mg Oral BID Scott Adjutant Nigel, DO     • venlafaxine  75 mg Oral TID Everlene Locks, DO          Today, Patient Was Seen By: Beth Simmons Gus Brock MD    **Please Note: This note may have been constructed using a voice recognition system  **

## 2023-05-08 NOTE — PROGRESS NOTES
Progress Note - Urology  Cece Motley 1955, 74 y o  female MRN: [id number]    Unit/Bed#: S -01 Encounter: [id number]      Assessment & Plan:  1  Bilateral hydronephrosis  - Ultrasound obtained yesterday showing bilateral hydronephrosis, slightly worsened as compared to ultrasound from 5/4/2023  Echogenic debris within bilateral collecting systems as well as the bladder which may resent a cellular debris versus blood products  Stable positioning of right ureteral stent  Nonobstructing left renal calculus measuring 5 mm   -Patient had previously had R nephrostomy converted to ureteral stent, due to right hydronephrosis/ right stricture due to compression from endometrial cancer   - Creatinine 3 25, yesterday 3 15  Creatinine has plateaued  On admission 4 1  Nephrology following    - VSS, afebrile  - Sifuentes catheter was removed for TOV this morning  Purewick showing cloudy yellow urine  - Check PVR with Sifuentes catheter removed  Patient reports she is voiding   - Discussed with patient bilateral hydronephrosis, slightly worsened compared to previous ultrasound  Discussed with  attending, recommending bilateral ureteral stent placement  If patient has persistent hydronephrosis and elevated creatinine despite stent placement, may need bilateral nephrostomy tubes  -Nephrology following, patient appears to have glomerular disease with heavy proteinuria, likely etiology of renal dysfunction   -N p o  at midnight  -Urology will continue to follow    Subjective:   HPI: Patient seen at bedside this morning  Reports Sifuentes catheter was removed this morning  Voiding with pure wick, cloudy yellow urine  Patient would like to avoid bilateral nephrostomy tube placements  Review of Systems   Constitutional: Negative for chills and fever  Respiratory: Negative for cough and shortness of breath  Cardiovascular: Negative for chest pain and palpitations     Gastrointestinal: Negative for abdominal pain and "vomiting  Genitourinary: Negative for dysuria, flank pain and hematuria  Musculoskeletal: Negative for arthralgias and back pain  Skin: Negative for color change and rash  Neurological: Negative for seizures and syncope  All other systems reviewed and are negative  Objective:  Vitals: Blood pressure 95/62, pulse 72, temperature 97 7 °F (36 5 °C), resp  rate 16, height 4' 11\" (1 499 m), SpO2 99 %, not currently breastfeeding  ,Body mass index is 23 43 kg/m²  Physical Exam  Vitals reviewed  Constitutional:       General: She is not in acute distress  Appearance: Normal appearance  She is normal weight  She is ill-appearing  She is not toxic-appearing or diaphoretic  HENT:      Head: Normocephalic and atraumatic  Right Ear: External ear normal       Left Ear: External ear normal       Nose: Nose normal       Mouth/Throat:      Mouth: Mucous membranes are moist    Eyes:      General: No scleral icterus  Conjunctiva/sclera: Conjunctivae normal    Cardiovascular:      Rate and Rhythm: Normal rate  Pulses: Normal pulses  Pulmonary:      Effort: Pulmonary effort is normal    Abdominal:      General: Abdomen is flat  There is no distension  Palpations: Abdomen is soft  Tenderness: There is no abdominal tenderness  There is no right CVA tenderness, left CVA tenderness or guarding  Genitourinary:     Comments: PureWick draining cloudy yellow urine  Musculoskeletal:         General: Normal range of motion  Cervical back: Normal range of motion  Skin:     General: Skin is warm  Findings: No rash  Neurological:      General: No focal deficit present  Mental Status: She is alert and oriented to person, place, and time  Mental status is at baseline  Psychiatric:         Mood and Affect: Mood normal          Behavior: Behavior normal          Thought Content:  Thought content normal          Judgment: Judgment normal          Imaging:    RENAL " ULTRASOUND     INDICATION:   Left hydronephrosis  Right ureteral stent  COMPARISON: CT abdomen pelvis and renal ultrasound 5/4/2023      TECHNIQUE:   Ultrasound of the retroperitoneum was performed with a curvilinear transducer utilizing volumetric sweeps and still imaging techniques      FINDINGS:     KIDNEYS:  Symmetric and normal size  Right kidney:  9 0 x 4 8 x 4 2 cm  Volume 94 1 mL  Left kidney:  10 3 x 5 5 x 5 8 cm  Volume 172 2 mL     Right kidney  The renal parenchyma is diffusely echogenic consistent with medical renal disease  Simple appearing cyst along the superior pole measures 3 0 x 2 5 x 2 4 cm  There is a mild hydronephrosis which is appears slightly worsened from recent ultrasound of 5/4/2023  Echogenic debris is noted within the right renal collecting system  Ureteral stent is in stable position  Intrarenal resistive indices are within normal limits ranging from 0 53-0 69  No shadowing calculi  No perinephric fluid collections      Left kidney  The renal parenchyma is diffusely echogenic consistent with medical renal disease  No mass is identified  Mild hydronephrosis appears slightly worsened from recent ultrasound  Echogenic debris is noted within the collecting system  Renal resistive indices are within normal limits ranging from 0 45-0 68  Nonobstructing calculus is seen within the lower pole measuring 5 mm  No perinephric fluid collections      URETERS:  Nonvisualized      BLADDER:  Partially distended  Right ureteral stent is partially visualized within the bladder  There is a small amount of echogenic debris  Tiny focus of air is suggested within the nondependent bladder, presumably from recent instrumentation/catheterization  The bladder wall is circumferentially thickened  Bilateral ureteral jets are not detected         IMPRESSION:  1  Redemonstration of bilateral hydronephrosis which appears slightly worsened as compared to ultrasound of 5/4/2023   There is echogenic debris within the bilateral collecting systems as well as the bladder which may represent cellular debris versus   blood products  2  Stable positioning of right ureteral stent  3  Nonobstructing left renal calculus measuring 5 mm  4  Redemonstration of circumferential urinary bladder wall thickening compatible with chronic urinary retention versus cystitis  There is a tiny focus of air within suggested within the bladder as can be seen in the setting of cystitis versus recent   catheterization/instrumentation   Correlation with urinalysis is recommended      The study was marked in EPIC for immediate notification      Workstation performed: [id number]    Labs:  Recent Labs     05/06/23  0535 05/07/23  0530 05/08/23  0515   WBC 9 82 11 94* 12 01*     Recent Labs     05/06/23  0535 05/07/23  0530 05/08/23  0515   HGB 7 9* 7 7* 7 4*     Recent Labs     05/06/23  0535 05/07/23  0530 05/08/23  0515   HCT 25 6* 25 6* 24 5*     Recent Labs     05/05/23  1656 05/06/23  0034 05/06/23  0535 05/07/23  0530 05/08/23  0515   CREATININE 3 48* 3 17* 3 15* 3 15* 3 20*       History:  Past Medical History:   Diagnosis Date   • Anxiety    • CKD (chronic kidney disease) stage 3, GFR 30-59 ml/min ([facility] [address] )    • COVID     Fall 2022   • Depression    • Endometrial cancer ([facility] [address] ) 12/2017   • GERD (gastroesophageal reflux disease)    • H/O gastric bypass    • Hard to intubate     pt denies   • History of chemotherapy     started 12/2021endometrial cancer- done 12/23/22   • History of DVT in adulthood     RLE   • Hyperglycemia     vx type 2 dm -- last assessed 4/1/14; resolved 11/7/17   • Hyperlipidemia    • Hypertension     in past- no meds at present   • Iron deficiency anemia     iron infusions weekly   • OAB (overactive bladder)    • Port-A-Cath in place    • Wears glasses      Social History     Socioeconomic History   • Marital status: Single     Spouse name: None   • Number of children: None   • Years of education: None   • Highest education level: None   Occupational History   • None   Tobacco Use   • Smoking status: Never   • Smokeless tobacco: Never   Vaping Use   • Vaping Use: Never used   Substance and Sexual Activity   • Alcohol use: Not Currently   • Drug use: No   • Sexual activity: Not Currently   Other Topics Concern   • None   Social History Narrative   • None     Social Determinants of Health     Financial Resource Strain: Not on file   Food Insecurity: Not on file   Transportation Needs: Not on file   Physical Activity: Not on file   Stress: Not on file   Social Connections: Not on file   Intimate Partner Violence: Not on file   Housing Stability: Not on file     Past Surgical History:   Procedure Laterality Date   • ABDOMINAL SURGERY      GASTRIC BYPASS; 2001   • BREAST BIOPSY Right 06/28/2019    core biopsy; benign   • CHOLECYSTECTOMY      at the time of gastric bypass   • COLONOSCOPY     • CT NEEDLE BIOPSY LYMPH NODE  07/08/2019   • FL GUIDED CENTRAL VENOUS ACCESS DEVICE INSERTION  11/12/2019   • FL RETROGRADE PYELOGRAM  3/30/2023   • GASTRIC BYPASS     • HYSTERECTOMY Bilateral 2017    total abdominal with salpingo-oophorectomy   • IR NEPHROSTOMY TO NEPHROURETERAL STENT  06/09/2022   • IR NEPHROSTOMY TO NEPHROURETERAL STENT  12/20/2022   • IR NEPHROSTOMY TUBE CHECK/CHANGE/REPOSITION/REINSERTION/UPSIZE  05/27/2022   • IR NEPHROSTOMY TUBE PLACEMENT  07/26/2019   • IR NEPHROURETERAL STENT CHECK/CHANGE/REPOSITION  04/06/2021   • IR NEPHROURETERAL STENT CHECK/CHANGE/REPOSITION  06/04/2021   • IR NEPHROURETERAL STENT CHECK/CHANGE/REPOSITION  09/03/2021   • IR NEPHROURETERAL STENT CHECK/CHANGE/REPOSITION  12/07/2021   • IR NEPHROURETERAL STENT CHECK/CHANGE/REPOSITION  03/08/2022   • IR NEPHROURETERAL STENT CHECK/CHANGE/REPOSITION  05/10/2022   • IR NEPHROURETERAL STENT CHECK/CHANGE/REPOSITION  09/19/2022   • IR PICC LINE  09/27/2019   • IR PORT PLACEMENT  07/26/2019   • IR PORT REMOVAL  09/20/2019   • OOPHORECTOMY Bilateral 2017   • NM CYSTO BLADDER W/URETERAL CATHETERIZATION Right 3/30/2023    Procedure: CYSTOSCOPY RETROGRADE PYELOGRAM WITH exchange of STENT URETERAL;  Surgeon: Kristine Hart MD;  [facility];  Service: Urology   • DE CYSTO W/INSERT URETERAL STENT Right 3/30/2023    Procedure: EXCHANGE STENT URETERAL;  Surgeon: Kristine Hart MD;  [facility];  Service: Urology   • DE INSJ TUNNELED CTR VAD W/SUBQ PORT AGE 5 YR/> Left 11/12/2019    Procedure: INSERTION VENOUS PORT ( PORT-A-CATH) IR;  Surgeon: Re Pablo DO;  [facility];  Service: Interventional Radiology   • DE LAPS ABD PRTM&OMENTUM DX W/WO [address]/ [facility] N/A 12/19/2017    Procedure: LAPAROSCOPY DIAGNOSTIC;  Surgeon: Robina Lee MD;  [facility];  Service: Gynecology Oncology   • DE LAPS W/RAD HYST W/BILAT LMPHADEC RMVL TUBE/OVARY N/A 12/19/2017    Procedure: HYSTERECTOMY LAPAROSCOPIC TOTAL ([address] [place] [address]) W/ ROBOTICS; BILATERAL SALPINGOOPHERECTOMY; LYMPH NODE DISSECTION; lysis of adhesions;  Surgeon: Robina Lee MD;  [facility];  Service: Gynecology Oncology   • TONSILLECTOMY     • US GUIDED BREAST BIOPSY RIGHT COMPLETE Right 06/28/2019     Family History   Problem Relation Age of Onset   • Hyperlipidemia Mother    • Heart disease Mother    • Ovarian cancer Mother 48   • Colon cancer Mother    • Lymphoma Father    • Breast cancer Sister 61   • No Known Problems Brother    • No Known Problems Brother    • No Known Problems Maternal Grandmother    • Bone cancer Maternal Grandfather    • Uterine cancer Paternal Grandmother    • No Known Problems Paternal Grandfather    • No Known Problems Maternal Aunt    • No Known Problems Maternal Aunt    • No Known Problems Maternal Aunt    • No Known Problems Maternal Aunt    • No Known Problems Paternal Aunt    • No Known Problems Paternal Aunt    • No Known Problems Paternal Aunt    • No Known Problems Paternal Aunt        Tyree Malina, [place]  Date: 5/8/2023 Time: 12:01 PM

## 2023-05-08 NOTE — ASSESSMENT & PLAN NOTE
- Phosphate of 8 4 on admission  - In setting of acute on chronic disease     Plan  -Continue PhosLo

## 2023-05-08 NOTE — ASSESSMENT & PLAN NOTE
- Patient with DVT in 2017  - Currently on Lovenox 80 mg daily  VAS US-negative    Plan  Continue heparin DVT prophylaxis

## 2023-05-08 NOTE — PROGRESS NOTES
Progress Note - Nephrology   Ragini Melendez 74 y o  female MRN: [id number]  Unit/Bed#: S -01 Encounter: [id number]      Assessment:  The patient presents with MONROE from her outpatient nephrologist, with outpatient labs showing Cr elevation to 4 26, and anion gap of 17 with bicarb of 12 on the day of presentation  She has had significant improvement with IV sodium bicarb since admission  Her MONROE may be secondary to her poor oral intake over the past week, or to her urinary retention as evidenced by bladder scan of 240 mL and hydronephrosis on imaging  Metabolic acidosis on presentation suspected to be secondary to her MONROE at this time  Hematuria and bladder wall thickening with her urinalysis of innumerable WBC/RBCs and moderate bacteria raise suspicion for UTI/cystitis  Plan:  1  MONROE  -Patient presented with creatinine of 4 26 on outpatient labs 5/4, POA creatinine 4 10  -Now stabilizing near creatinine of 3 15  -Baseline creatinine 0 9 - 1 1  -IV fluids held as of 5/7  -Avoid nephrotoxic agents, IV contrast, NSAIDs  -Repeat renal ultrasound pending final read, though urology mentions possibility of worsening hydronephrosis and were considering nephrostomy tube placement   -History of right-sided nephrostomy tube  -Etiology of left hydronephrosis is unclear so far  -Monitor BMP daily      2  Metabolic acidosis  -POA VBG: pH 7 207, PCO2 18 5, PO2 86 7, HCO3 7 2   -Acidosis improved to 7 309 after initiation of bicarb IVF  -IV fluids discontinued, bicarb and anion gap stable      3  Hemorrhagic cystitis  -Bladder irrigation  and Sifuentes discontinued, urine output is yellow/cloudy on 5/8   -Urology on board  -On antibiotics per primary team   -Following urine culture positive for greater than 100,000 Staph aureus, though this likely represents colonization rather than an acute infectious process, though defer to primary team and urology for final treatment decision      4   CKD stage III  -Avoid nephrotoxic agents, IV contrast, NSAIDs   -Baseline creatinine 0 9-1 1, most recent outpatient labs in March showed elevated creatinine of 1 60   -Creatinine appears to be stabilizing near 3 15, which may represent progression of her CKD rather than any acute changes  -Maintaining IV hydration as above      5  Nephrotic range proteinuria  -The patient has been following with Dr Rory Liu with nephrology, who has been working up proteinuria  -Urine albumin to creatinine ratio has been steadily rising since June 2022, most recently elevated to  -Suspected to be secondary to bevacizumab, which was held on 9/9/2022, though additional work-up has been pursued  -CHERI 1: 80, dsDNA negative, no complementemia, hepatitis B/hepatitis C/HIV negative, SPEP negative x2, serum free light chain ratio 1 5, RF negative  -Pending outpatient labs to follow from 5/4/2023: Phospholipase A2 receptor antibodies, methylmalonic acid level,  protein electrophoresis serum, cryoglobulin level  -Immunoglobulin free LT chains: IgG kappa to 48 5, Ig lambda 137 3, kappa/lambda ratio 1 81 (elevated)  -Plan to repeat free light chain level in steady state   -Avoiding RAAS inhibition due to low systolic blood pressure as outpatient   -Outpatient plan was to assess kidney function with nuclear medicine scan to determine which kidney to biopsy      6  Hyperphosphatemia  -Continuing PhosLo phosphate binder 667 3 times daily with meals  -Monitor phosphorus daily, now 5 1     7  Hypomagnesemia  -Repletion and monitoring as per primary team   -Improved to 2 2 on 5/6      8  Endometrial cancer (in remission)  -Status post rucaparib, atezolizumab, and bevacizumab, from December 2020 - December 2022   -Currently monitoring for recurrence   -Had previously involved the right ureter causing stenosis, leading to her current ureteral stent  Subjective:   Patient has had no acute concerns overnight    States that she is both thirsty and hungry and would like to know "if she needs to be n p o  Otherwise, she has not had any difficulty with urination and is no longer having a Sifuentes  He collected urine from this morning was yellow/cloudy  He has not had any burning or pain with urination  She denies any fevers or chills, abdominal pain, nausea, vomiting, or diarrhea  She states that she has right sided soreness at the costovertebral angle which comes and goes, although this is currently not bothering her, and at its worst she does not usually need to use more than Tylenol to relieve the pain  Objective:     Vitals: Blood pressure 95/62, pulse 72, temperature 97 7 °F (36 5 °C), resp  rate 16, height 4' 11\" (1 499 m), SpO2 99 %, not currently breastfeeding  ,Body mass index is 23 43 kg/m²  Weight (last 2 days)     None            Intake/Output Summary (Last 24 hours) at 5/8/2023 1050  Last data filed at 5/8/2023 0906  Gross per 24 hour   Intake 1328 33 ml   Output 3350 ml   Net -2021 67 ml            Physical Exam:  Physical Exam  Vitals and nursing note reviewed  Constitutional:       General: She is not in acute distress  Appearance: She is well-developed and normal weight  She is not toxic-appearing  HENT:      Head: Normocephalic and atraumatic  Nose: Nose normal  No rhinorrhea  Mouth/Throat:      Mouth: Mucous membranes are moist    Eyes:      General: No scleral icterus  Extraocular Movements: Extraocular movements intact  Conjunctiva/sclera: Conjunctivae normal    Cardiovascular:      Rate and Rhythm: Normal rate and regular rhythm  Pulses: Normal pulses  Heart sounds: No murmur heard  Pulmonary:      Effort: Pulmonary effort is normal  No respiratory distress  Breath sounds: Normal breath sounds  No wheezing or rales  Abdominal:      General: There is no distension  Palpations: Abdomen is soft  There is no mass  Tenderness: There is no abdominal tenderness  There is right CVA tenderness   There is no left CVA " tenderness or guarding  Comments: States that her CVA on the right is sore to the touch, though not exquisitely tender  Genitourinary:     Comments: Urine output this morning appeared yellow/cloudy and no clots or pink tinge  Musculoskeletal:         General: No swelling  Cervical back: Neck supple  Right lower leg: No edema  Left lower leg: No edema  Skin:     General: Skin is warm and dry  Capillary Refill: Capillary refill takes less than 2 seconds  Neurological:      Mental Status: She is alert and oriented to person, place, and time  Psychiatric:         Mood and Affect: Mood normal          Behavior: Behavior normal          Thought Content: Thought content normal          Lab, Imaging and other studies: Urine culture  I have personally reviewed pertinent labs    CBC:   Lab Results   Component Value Date    WBC 12 01 (H) 05/08/2023    HGB 7 4 (L) 05/08/2023    HCT 24 5 (L) 05/08/2023    MCV 94 05/08/2023     05/08/2023    MCH 28 5 05/08/2023    MCHC 30 2 (L) 05/08/2023    RDW 14 6 05/08/2023    MPV 10 4 05/08/2023     CMP:   Lab Results   Component Value Date    SODIUM 138 05/08/2023    K 4 9 05/08/2023     05/08/2023    CO2 21 05/08/2023    BUN 53 (H) 05/08/2023    CREATININE 3 20 (H) 05/08/2023    CALCIUM 8 6 05/08/2023    EGFR 14 05/08/2023     Phosphorus:   Lab Results   Component Value Date    PHOS 5 1 (H) 05/08/2023

## 2023-05-08 NOTE — UTILIZATION REVIEW
Initial Clinical Review    Admission: Date/Time/Statement:   Admission Orders (From admission, onward)     Ordered        05/04/23 2222  INPATIENT ADMISSION  Once                      Orders Placed This Encounter   Procedures   • INPATIENT ADMISSION     Standing Status:   Standing     Number of Occurrences:   1     Order Specific Question:   Level of Care     Answer:   Med Surg [16]     Order Specific Question:   Estimated length of stay     Answer:   More than 2 Midnights     Order Specific Question:   Certification     Answer:   I certify that inpatient services are medically necessary for this patient for a duration of greater than two midnights  See H&P and MD Progress Notes for additional information about the patient's course of treatment  ED Arrival Information     Expected   5/4/2023     Arrival   5/4/2023 16:08    Acuity   Urgent            Means of arrival   Walk-In    Escorted by   Family Member    Service   Hospitalist    Admission type   Emergency            Arrival complaint   MONROE (acute kidney injury)           Chief Complaint   Patient presents with   • Abnormal Lab     Presents today after being told by her nephrologist to come in for evaluation of abnormal labs  Denies pain or urinary symptoms  Initial Presentation: 74 y o  female PMHx of endometrial cancer s/p chemotherapy currently in remission, DVT, CKD3, HTN, anemia, chronic UTI, hydronephrosis requiring nephroureteral stent placement 2/2 to cancer who presents to ED from home at request of her Nephrologist for acutely worsening kidney function and metabolic acidosis on outpt labs   Pt reports decreased appetite and fatigue x 1 5 weeks, no bm x >1 week   On exam,pt has grossly bloody urine when manley placed ultimately requiring 3 way catheter for bladder irrigation due to clotting  R flank pain , ecchymosis over the lower abdomen; patient reports bruising from her home lovenox (80mg) for DVT prophylaxis  Prior CDK3a now 5       Labs - low sodium 132, phos elevated 8 4 , WBC elevated 13 84, increased creat 4 26 (baseline 0 9-1 1)     AG 17  Bicarb 11  VBG - metabolic acidosis of 0 554/32 9/44 5/9 2  US kidney and bladder -mild bilat hydronephrosis L > R, chronic circumferential bladder wall thickening  CT A/P shows mild left hydronephrosis and hydroureter (pyelonephritis not excluded), possible cystitis and severe constipation  Patient started on sodium bicarb drip and cefepime in ED  Pt admitted as Inpatient with hemorrhagic cystitis w/ pyelonephritis,MONROE metabolic acidosis w/ increased anion gap  Plan - nephro consult, IVF - bicarb drip, IV Cefepime  F/U blood and urine cx  Strict I/O  Continue CBI until clear  Monitor BMP, replete lytes as needed  CBC  Kylie Cruz Keep MAP >65  Hold Lovenox  Start heparin DVT ppx  Venous duplex  Start PhosLo  Recheck phosphate   CL diet  Bowel regime, monitor for BM  Date: 5/5  Day 2:    Hemorrhagic cystitis with pyelonephritis in the setting of MONROE and metabolic acidosis  Patient had 1 bowel movement this morning after receiving a dose of rectal suppository  Pt continues on Bicarb drip until correction   IV fluid normal saline 100 ml/ hour  Creat downtrending   Continue IV cefepime   AG 14 this am,bicarb 19  Recheck VBG and BMP 1500 today  Hold home Lovenox   Heparin DVT ppx  CL diet   Sifuentes cath w/ CBI -urine pink early this am with mucous/sediment  Venous duplex neg for DVT BLE  Nephrology consult- significant improvement with IV sodium bicarb since admission  Acidosis improved to 7 309 after initiation of bicarb IVF   MONROE may be secondary to her poor oral intake over the past week, or to her urinary retention as evidenced by bladder scan of 240 mL and hydronephrosis on imaging  Continuing IV bicarb 150 mEq with 5% dextrose at 100 mL/h  Bladder irrigation as required for hemorrhagic cystitis, maintain catheterization to relieve obstruction   Urine output this morning appeared light pink with white debris and no clots   BMP daily  Monitor phosphorus daily, now downtrending 8 6 --> 6 8  Mag low1 5 today- repletion ordered  Monitor Mag daily   ED Triage Vitals   Temperature Pulse Respirations Blood Pressure SpO2   05/04/23 1727 05/04/23 1727 05/04/23 1727 05/04/23 1727 05/04/23 1727   97 7 °F (36 5 °C) 88 16 99/60 99 %      Temp Source Heart Rate Source Patient Position - Orthostatic VS BP Location FiO2 (%)   05/04/23 1727 05/04/23 1727 05/04/23 1727 05/04/23 1727 --   Oral Monitor Lying Right arm       Pain Score       05/05/23 0025       6          Wt Readings from Last 1 Encounters:   04/28/23 52 6 kg (116 lb)     Additional Vital Signs:   Date/Time Temp Pulse Resp BP MAP (mmHg) SpO2   05/05/23 06:05:04 98 °F (36 7 °C) 70 -- 116/66 83 98 %   05/05/23 0025 97 9 °F (36 6 °C) 82 18 112/78 -- 100 %   05/04/23 1945 -- 71 18 114/64 83 --     Pertinent Labs/Diagnostic Test Results:   CT abdomen pelvis wo contrast   Final Result by Paco Dominguez MD (05/04 2143)         1  Right ureteral stent in expected position  Resolution of subcapsular collection  No obstructive uropathy  2  Mild left hydronephrosis and hydroureter  No obstructing calculus  Pyelonephritis not excluded  3  Persistent circumferential bladder wall thickening could indicate cystitis  4  Severe constipation  Workstation performed: [id number]         US kidney and bladder with pvr   Final Result by Mulu Angel MD (05/04 1952)      The nephroureteral stent is in place  Mild bilateral hydronephrosis more prominent on the left  Chronic circumferential bladder wall thickening  Workstation performed: [id number]         VAS lower limb venous duplex study, complete bilateral    5/5/23   RIGHT LOWER LIMB:  No evidence of acute or chronic deep vein thrombosis  No evidence of superficial thrombophlebitis noted  No evidence of valvular incompetence noted in the deep veins    Popliteal, posterior tibial and anterior tibial arterial Doppler waveform's are  triphasic  LEFT LOWER LIMB:  No evidence of acute or chronic deep vein thrombosis  No evidence of superficial thrombophlebitis noted  No evidence of valvular incompetence noted in the deep veins  Popliteal, posterior tibial and anterior tibial arterial Doppler waveform's are  triphasic  Tech Note:  There is an echogenic structure located in the Right inguinal region measuring  approximately 1 83 cm suggestive of enlarged lymphatic channels  This result has not been signed  Information might be incomplete  Results from last 7 days   Lab Units 05/08/23  0515 05/07/23  0530 05/06/23  0535 05/05/23  0537 05/05/23  0024 05/04/23  1357   WBC Thousand/uL 12 01* 11 94* 9 82 13 02*  --  13 84*   HEMOGLOBIN g/dL 7 4* 7 7* 7 9* 8 0*  --  9 4*   HEMATOCRIT % 24 5* 25 6* 25 6* 26 1*  --  31 4*   PLATELETS Thousands/uL 292 293 323 328   < > 393*   NEUTROS ABS Thousands/µL  --   --   --  11 09*  --   --     < > = values in this interval not displayed  Results from last 7 days   Lab Units 05/08/23  0515 05/07/23  0530 05/06/23  0535 05/06/23  0034 05/05/23  1656 05/05/23  0537 05/04/23  2157 05/04/23  1357   SODIUM mmol/L 138 140 139 137 136 137   < > 132*   POTASSIUM mmol/L 4 9 4 1 4 1 3 9 4 0 3 2*   < > 4 6   CHLORIDE mmol/L 106 106 108 105 102 104   < > 103   CO2 mmol/L 21 24 21 21 22 19*   < > 12*   ANION GAP mmol/L 11 10 10 11 12 14*   < > 17*   BUN mg/dL 53* 58* 65* 69* 71* 78*   < > 89*   CREATININE mg/dL 3 20* 3 15* 3 15* 3 17* 3 48* 3 60*   < > 4 26*   EGFR ml/min/1 73sq m 14 14 14 14 13 12   < > 10   CALCIUM mg/dL 8 6 8 6 8 5 8 5 8 3* 8 2*   < > 9 2   MAGNESIUM mg/dL  --   --  2 2  --  2 4 1 5*  --   --    PHOSPHORUS mg/dL 5 1*  --  5 2*  --  5 2* 6 8*  --  8 4*    < > = values in this interval not displayed       Results from last 7 days   Lab Units 05/06/23  0535 05/05/23  0537 05/04/23  1357   AST U/L 6* 6* 7*   ALT U/L 5* 5* 7   ALK PHOS U/L 90 94 118*   TOTAL PROTEIN g/dL 7 1 7 3 8 9*   ALBUMIN g/dL 2 9* 3 0* 3 7   TOTAL BILIRUBIN mg/dL 0 29 0 32 0 33         Results from last 7 days   Lab Units 05/08/23  0515 05/07/23  0530 05/06/23  0535 05/06/23  0034 05/05/23  1656 05/05/23  0537 05/04/23 2157 05/04/23  1357   GLUCOSE RANDOM mg/dL 86 84 85 94 155* 291* 81 108             No results found for: BETA-HYDROXYBUTYRATE       Results from last 7 days   Lab Units 05/05/23  1656 05/05/23  0024 05/04/23  1941   PH MIESHA  7 322 7 309 7 207*   PCO2 MIESHA mm Hg 42 8 66 5* 18 5*   PO2 MIESHA mm Hg 44 4 35 6 86 7*   HCO3 MIESHA mmol/L 21 7* 32 7* 7 2*   BASE EXC MIESHA mmol/L -4 1 5 3 -18 9   O2 CONTENT MIESHA ml/dL 8 8 7 7 11 0   O2 HGB, VENOUS % 72 9 62 9 91 2*                     Results from last 7 days   Lab Units 05/08/23 0515 05/05/23  0537   PROTIME seconds 13 7  --    INR  1 03  --    PTT seconds  --  36                             Results from last 7 days   Lab Units 05/04/23  1357   FERRITIN ng/mL 1,618*   IRON SATURATION % 39   IRON ug/dL 74   TIBC ug/dL 188*                                 Results from last 7 days   Lab Units 05/04/23 2127   CLARITY UA  Extra Turbid   COLOR UA  Light Orange   SPEC GRAV UA  1 006   PH UA  5 0   GLUCOSE UA mg/dl Negative   KETONES UA mg/dl Negative   BLOOD UA  Large*   PROTEIN UA mg/dl 30 (1+)*   NITRITE UA  Negative   BILIRUBIN UA  Negative   UROBILINOGEN UA (BE) mg/dl <2 0   LEUKOCYTES UA  Large*   WBC UA /hpf Innumerable*   RBC UA /hpf Innumerable*   BACTERIA UA /hpf Occasional   EPITHELIAL CELLS WET PREP /hpf None Seen                                 Results from last 7 days   Lab Units 05/04/23 2157 05/04/23 2156 05/04/23 2127   BLOOD CULTURE  No Growth at 72 hrs   No Growth at 72 hrs   --    URINE CULTURE   --   --  >100,000 cfu/ml Staphylococcus aureus*  >100,000 cfu/ml Staphylococcus aureus*     Results from last 7 days   Lab Units 05/04/23  1357   TOTAL COUNTED  100           ED Treatment:   Medication Administration from 05/04/2023 1520 to 05/05/2023 0014       Date/Time Order Dose Route Action     05/04/2023 2152 EDT sodium chloride 0 9 % bolus 1,000 mL 0 mL Intravenous Stopped     05/04/2023 1922 EDT sodium chloride 0 9 % bolus 1,000 mL 1,000 mL Intravenous New Bag     05/04/2023 2107 EDT sodium bicarbonate 150 mEq in dextrose 5 % 1,000 mL infusion 100 mL/hr Intravenous New Bag     05/04/2023 2230 EDT cefepime (MAXIPIME) 2 g/50 mL dextrose IVPB 0 mg Intravenous Stopped     05/04/2023 2200 EDT cefepime (MAXIPIME) 2 g/50 mL dextrose IVPB 2,000 mg Intravenous New Bag        Past Medical History:   Diagnosis Date   • Anxiety    • CKD (chronic kidney disease) stage 3, GFR 30-59 ml/min ([facility])    • COVID     Fall 2022   • Depression    • Endometrial cancer ([place] [address] ) 12/2017   • GERD (gastroesophageal reflux disease)    • H/O gastric bypass    • Hard to intubate     pt denies   • History of chemotherapy     started 12/2021endometrial cancer- done 12/23/22   • History of DVT in adulthood     RLE   • Hyperglycemia     vx type 2 dm -- last assessed 4/1/14; resolved 11/7/17   • Hyperlipidemia    • Hypertension     in past- no meds at present   • Iron deficiency anemia     iron infusions weekly   • OAB (overactive bladder)    • Port-A-Cath in place    • Wears glasses      Present on Admission:  • Endometrial cancer ([place] [address] )  • Acute on chronic kidney disease  • Hemorrhagic cystitis w/ pyelonephritis  • (Resolved) Hyponatremia  • (Resolved) Metabolic acidosis, increased anion gap  • Hyperphosphatemia  • Constipation      Admitting Diagnosis: Dehydration [E86 0]  Hemorrhagic cystitis [N30 91]  UTI (urinary tract infection) [N39 0]  Hematuria [R31 9]  Abnormal laboratory test result [R89 9]  Acute kidney injury ([place] [address] ) [N17 9]  Age/Sex: 74 y o  female  Admission Orders:  Scheduled Medications:  ARIPiprazole, 20 mg, Oral, Daily  calcium acetate, 667 mg, Oral, TID With Meals  cefepime, 1,000 mg, Intravenous, Q24H  docusate sodium, 100 mg, Oral, BID  folic acid, 1 mg, Oral, Daily  oxybutynin, 15 mg, Oral, HS  pantoprazole, 40 mg, Oral, Early Morning  polyethylene glycol, 17 g, Oral, Daily  saccharomyces boulardii, 250 mg, Oral, BID  venlafaxine, 75 mg, Oral, TID    potassium chloride (K-DUR,KLOR-CON) CR tablet 40 mEq  Dose: 40 mEq  Freq: Once Route: PO  Start: 05/05/23 0845 End: 05/05/23 0953  bisacodyl (DULCOLAX) rectal suppository 10 mg  Dose: 10 mg  Freq: Once Route: RE  Start: 05/05/23 0330 End: 05/05/23 0557  magnesium sulfate 2 g/50 mL IVPB (premix) 2 g  Dose: 2 g  Freq: Once Route: IV  Last Dose: 2 g (05/05/23 0948)  Start: 05/05/23 0845 End: 05/05/23 1148      Continuous IV Infusions:     PRN Meds:  acetaminophen, 650 mg, Oral, Q4H PRN  bisacodyl, 10 mg, Rectal, Once PRN  oxyCODONE, 5 mg, Oral, Q4H PRN  oxyCODONE, 2 5 mg, Oral, Q4H PRN    CL diet   CBI      IP CONSULT TO NEPHROLOGY  IP CONSULT TO UROLOGY    Network Utilization Review Department  ATTENTION: Please call with any questions or concerns to [phone] and carefully listen to the prompts so that you are directed to the right person  All voicemails are confidential   Chevy Hunter all requests for admission clinical reviews, approved or denied determinations and any other requests to dedicated fax number below belonging to the campus where the patient is receiving treatment   List of dedicated fax numbers for the Facilities:  [address] DENIALS (Administrative/Medical Necessity) [phone]   [address] [place] [address] (Maternity/NICU/Pediatrics) [phone]   [address] Luz [address] [facility] [address] [id number] [place] [address] [facility] [address] [place], [place] [address] [facility] [phone]           Iver Goltz, RN  Registered Nurse  Specialty:  Utilization Review  Utilization Review     Signed  Date of Service:  5/6/2023  4:12 PM     Signed        Continued Stay Review     Date: 5/6/23                         Current Patient Class: inpatient                  Current Level of Care: med surg      HPI:67 y o  female initially admitted on 5/4/23 inpatient due to hemorrhagic cystitis with pyelonephritis/Acute on chronic kidney disease  PMHx of endometrial cancer s/p chemotherapy currently in remission, DVT, CKD3, HTN, anemia, chronic UTI, hydronephrosis requiring nephroureteral stent placement 2/2 to cancer   Baseline Cr: 0 9-1  1     Assessment/Plan:   Date: 5/6/23     Day 3: Has surpassed a 2nd midnight with active treatments and services  Still hypotensive at times  Weakness less  On exam appears ill  Manley in place with yellow urine  H&h 7 9/25 6  Bun 65  Creatinine 3 15  Continue IV Cefepime  Dc manley  Void trial    Us kidney and bladder tomorrow  Continue IVF       Vital Signs:   05/06/23 15:33:48 97 9 °F (36 6 °C) 57 20 105/61 76 94 % -- --   05/06/23 0913 -- -- -- 102/62 -- -- -- Lying   05/06/23 0900 -- -- -- -- -- -- None (Room air) --   05/06/23 0751 97 8 °F (36 6 °C) 70 16 88/56 Abnormal  -- 99 % None (Room air        Pertinent Labs/Diagnostic Results:   VAS lower limb venous duplex study, complete bilateral   Final Result by Theo Stern MD (05/05 7662)   RIGHT LOWER LIMB:  No evidence of acute or chronic deep vein thrombosis  No evidence of superficial thrombophlebitis noted  No evidence of valvular incompetence noted in the deep veins    Popliteal, posterior tibial and anterior tibial arterial Doppler waveform's are  triphasic      LEFT LOWER LIMB:  No evidence of acute or chronic deep vein thrombosis  No evidence of superficial thrombophlebitis noted  No evidence of valvular incompetence noted in the deep veins  Popliteal, posterior tibial and anterior tibial arterial Doppler waveform's are  triphasic      Tech Note:  There is an echogenic structure located in the Right inguinal region measuring  approximately 1 83 cm suggestive of enlarged lymphatic channels  CT abdomen pelvis wo contrast   Final Result by Clare Morris MD (05/04 2143)           1  Right ureteral stent in expected position  Resolution of subcapsular collection  No obstructive uropathy  2  Mild left hydronephrosis and hydroureter  No obstructing calculus  Pyelonephritis not excluded  3  Persistent circumferential bladder wall thickening could indicate cystitis     4  Severe constipation                Workstation performed: [id number]           US kidney and bladder with pvr   Final Result by Howard Nash MD (05/04 1952)       The nephroureteral stent is in place        Mild bilateral hydronephrosis more prominent on the left        Chronic circumferential bladder wall thickening                Workstation performed: [id number]           US kidney and bladder with pvr    (Results Pending)              Results from last 7 days   Lab Units 05/06/23  0535 05/05/23  0537 05/05/23  0024 05/04/23  1357   WBC Thousand/uL 9 82 13 02*  --  13 84*   HEMOGLOBIN g/dL 7 9* 8 0*  --  9 4*   HEMATOCRIT % 25 6* 26 1*  --  31 4*   PLATELETS Thousands/uL 323 328 292 393*   NEUTROS ABS Thousands/µL  --  11 09*  --   --                     Results from last 7 days   Lab Units 05/06/23  0535 05/06/23  0034 05/05/23  1656 05/05/23  0537 05/04/23  2157 05/04/23  1357   SODIUM mmol/L 139 137 136 137 135 132*   POTASSIUM mmol/L 4 1 3 9 4 0 3 2* 4 6 4 6   CHLORIDE mmol/L 108 105 102 104 109* 103   CO2 mmol/L 21 21 22 19* 11* 12*   ANION GAP mmol/L 10 11 12 14* 15* 17*   BUN mg/dL 65* 69* 71* 78* 87* 89*   CREATININE mg/dL 3 15* 3 17* 3 48* 3 60* 4 10* 4 26*   EGFR ml/min/1 73sq m 14 14 13 12 10 10   CALCIUM mg/dL 8 5 8 5 8 3* 8 2* 8 7 9 2   MAGNESIUM mg/dL 2 2  --  2 4 1 5*  --   --    PHOSPHORUS mg/dL 5 2*  --  5 2* 6 8*  --  8 4*             Results from last 7 days   Lab Units 05/06/23  0535 05/05/23  0537 05/04/23  1357   AST U/L 6* 6* 7*   ALT U/L 5* 5* 7   ALK PHOS U/L 90 94 118*   TOTAL PROTEIN g/dL 7 1 7 3 8 9*   ALBUMIN g/dL 2 9* 3 0* 3 7   TOTAL BILIRUBIN mg/dL 0 29 0 32 0 33                    Results from last 7 days   Lab Units 05/06/23  0535 05/06/23  0034 05/05/23  1656 05/05/23  0537 05/04/23  2157 05/04/23  1357   GLUCOSE RANDOM mg/dL 85 94 155* 291* 81 108             Results from last 7 days   Lab Units 05/05/23  1656 05/05/23  0024 05/04/23  1941   PH MIESHA   7 322 7 309 7 207*   PCO2 MIESHA mm Hg 42 8 66 5* 18 5*   PO2 MIESHA mm Hg 44 4 35 6 86 7*   HCO3 MIESHA mmol/L 21 7* 32 7* 7 2*   BASE EXC MIESHA mmol/L -4 1 5 3 -18 9   O2 CONTENT MIESHA ml/dL 8 8 7 7 11 0   O2 HGB, VENOUS % 72 9 62 9 91 2*           Results from last 7 days   Lab Units 05/05/23  0537   PTT seconds 36           Results from last 7 days   Lab Units 05/04/23  1357   FERRITIN ng/mL 1,618*   IRON SATURATION % 39   IRON ug/dL 74   TIBC ug/dL 188*           Results from last 7 days   Lab Units 05/04/23  2127   CLARITY UA   Extra Turbid   COLOR UA   Light Orange   SPEC GRAV UA   1 006   PH UA   5 0   GLUCOSE UA mg/dl Negative   KETONES UA mg/dl Negative   BLOOD UA   Large*   PROTEIN UA mg/dl 30 (1+)*   NITRITE UA   Negative   BILIRUBIN UA   Negative   UROBILINOGEN UA (BE) mg/dl <2 0   LEUKOCYTES UA   Large*   WBC UA /hpf Innumerable*   RBC UA /hpf Innumerable*   BACTERIA UA /hpf Occasional   EPITHELIAL CELLS WET PREP /hpf None Seen             Results from last 7 days   Lab Units 05/04/23 2157 05/04/23 2156 05/04/23 2127   BLOOD CULTURE   No Growth at 24 hrs   No Growth at 24 hrs   --    URINE CULTURE    --   --  >100,000 cfu/ml Staphylococcus aureus*     Results from last 7 days   Lab Units 05/04/23  1357   TOTAL COUNTED   100         Medications:   Scheduled Medications:  ARIPiprazole, 20 mg, Oral, Daily  aspirin, 81 mg, Oral, QPM  cefepime, 1,000 mg, Intravenous, Q24H  docusate sodium, 100 mg, Oral, BID  folic acid, 1 mg, Oral, Daily  heparin (porcine), 5,000 Units, Subcutaneous, Q8H CHRISTIANO  oxybutynin, 15 mg, Oral, HS  pantoprazole, 40 mg, Oral, Early Morning  polyethylene glycol, 17 g, Oral, Daily  saccharomyces boulardii, 250 mg, Oral, BID  venlafaxine, 75 mg, Oral, TID        Continuous IV Infusions:  multi-electrolyte, 100 mL/hr, Intravenous, Continuous        PRN Meds:  acetaminophen, 650 mg, Oral, Q4H PRN  bisacodyl, 10 mg, Rectal, Once PRN  oxyCODONE, 5 mg, Oral, Q4H PRN  oxyCODONE, 2 5 mg, Oral, Q4H PRN           Discharge Plan: To be determined       Network Utilization Review Department  ATTENTION: Please call with any questions or concerns to [phone] and carefully listen to the prompts so that you are directed to the right person  All voicemails are confidential   Karan Villalba all requests for admission clinical reviews, approved or denied determinations and any other requests to dedicated fax number below belonging to the campus where the patient is receiving treatment   List of dedicated fax numbers for the Facilities:  [address] DENIALS (Administrative/Medical Necessity) [phone]   [address] (Maternity/NICU/Pediatrics) [phone]   [address] [phone]   [facility] [phone]   [address] [place] [address] [address] [place] [address] [place] [address] [phone]   [facility] [place] [phone] [address] [phone]

## 2023-05-08 NOTE — ASSESSMENT & PLAN NOTE
- Patient sent to hospital by Nephro for MONROE and metabolic acidosis discovered on recent labs - followed by Nephrology for chronic cystitis, kidney disease w/ proteinuria (HTN, obstructive uropathy, underlying glomerular pathology, age-related nephron loss pathology)  - Recent nephroureteral stent exchange by Urology in March; right sided nephroureteral stent placement 2/2 to hydronephrosis from endometrial carcinoma  - UA revealing innumerable WBC, RBC and occasional bacteria  - Urine grossly bloody requiring 3 way catheter for continuous irrigation as manley was clotting   - CT abd revealing no obstructive uropathy, mild left hydronephrosis and hydrorureter (pyelonephritis not excluded), persistent circumferential bladder wall thickening possibly indicating cystitis as well as severe constipation     - Suspect bleed 2/2 to cystitis causing MONROE possibly resulting in super therapeutic dose of lovenox       Plan  Appreciate nephrology recommendations- Treatment for urinary tract infection which does not appear to be pyelonephritis but would need to make sure she could have a biopsy with this ongoing, Creatinine plateau    Continue cefepime for now   UA culture-Staph aureus  Urinary retention protocol  Renal US-  Appreciate Urology recommendations- Spoke with patient about bilateral nephrostomy tube placement, will reach out to IR tomorrow for consult, n p o  at midnight

## 2023-05-08 NOTE — ASSESSMENT & PLAN NOTE
-  Baseline Cr: 0 9-1 1  - Prior CDK3a now 5   - Suspect 2/2 to UTI  Estimated Creatinine Clearance: 13 5 mL/min (A) (by C-G formula based on SCr of 3 2 mg/dL (H))    Cr-3 20    Plan  Avoid nephrotoxic drugs  Avoid hypotension  Monitor BMP

## 2023-05-09 ENCOUNTER — ANESTHESIA EVENT (INPATIENT)
Dept: PERIOP | Facility: HOSPITAL | Age: 68
End: 2023-05-09

## 2023-05-09 ENCOUNTER — TELEPHONE (OUTPATIENT)
Dept: UROLOGY | Facility: CLINIC | Age: 68
End: 2023-05-09

## 2023-05-09 ENCOUNTER — ANESTHESIA (INPATIENT)
Dept: PERIOP | Facility: HOSPITAL | Age: 68
End: 2023-05-09

## 2023-05-09 ENCOUNTER — APPOINTMENT (INPATIENT)
Dept: RADIOLOGY | Facility: HOSPITAL | Age: 68
End: 2023-05-09

## 2023-05-09 LAB
ANION GAP SERPL CALCULATED.3IONS-SCNC: 11 MMOL/L (ref 4–13)
BUN SERPL-MCNC: 52 MG/DL (ref 5–25)
CALCIUM SERPL-MCNC: 9 MG/DL (ref 8.4–10.2)
CHLORIDE SERPL-SCNC: 105 MMOL/L (ref 96–108)
CO2 SERPL-SCNC: 20 MMOL/L (ref 21–32)
CREAT SERPL-MCNC: 3.01 MG/DL (ref 0.6–1.3)
ERYTHROCYTE [DISTWIDTH] IN BLOOD BY AUTOMATED COUNT: 14.4 % (ref 11.6–15.1)
GFR SERPL CREATININE-BSD FRML MDRD: 15 ML/MIN/1.73SQ M
GLUCOSE SERPL-MCNC: 89 MG/DL (ref 65–140)
HCT VFR BLD AUTO: 26 % (ref 34.8–46.1)
HGB BLD-MCNC: 7.8 G/DL (ref 11.5–15.4)
MCH RBC QN AUTO: 28.9 PG (ref 26.8–34.3)
MCHC RBC AUTO-ENTMCNC: 30 G/DL (ref 31.4–37.4)
MCV RBC AUTO: 96 FL (ref 82–98)
PLATELET # BLD AUTO: 253 THOUSANDS/UL (ref 149–390)
PMV BLD AUTO: 10.1 FL (ref 8.9–12.7)
POTASSIUM SERPL-SCNC: 4.2 MMOL/L (ref 3.5–5.3)
RBC # BLD AUTO: 2.7 MILLION/UL (ref 3.81–5.12)
SODIUM SERPL-SCNC: 136 MMOL/L (ref 135–147)
WBC # BLD AUTO: 13.19 THOUSAND/UL (ref 4.31–10.16)

## 2023-05-09 PROCEDURE — 0TP98DZ REMOVAL OF INTRALUMINAL DEVICE FROM URETER, VIA NATURAL OR ARTIFICIAL OPENING ENDOSCOPIC: ICD-10-PCS | Performed by: INTERNAL MEDICINE

## 2023-05-09 PROCEDURE — 0T768DZ DILATION OF RIGHT URETER WITH INTRALUMINAL DEVICE, VIA NATURAL OR ARTIFICIAL OPENING ENDOSCOPIC: ICD-10-PCS | Performed by: INTERNAL MEDICINE

## 2023-05-09 PROCEDURE — BT141ZZ FLUOROSCOPY OF KIDNEYS, URETERS AND BLADDER USING LOW OSMOLAR CONTRAST: ICD-10-PCS | Performed by: INTERNAL MEDICINE

## 2023-05-09 PROCEDURE — 0T778DZ DILATION OF LEFT URETER WITH INTRALUMINAL DEVICE, VIA NATURAL OR ARTIFICIAL OPENING ENDOSCOPIC: ICD-10-PCS | Performed by: INTERNAL MEDICINE

## 2023-05-09 DEVICE — STENT URETERAL 7FR 24CM INLAY OPTIMA W/HYDROGLIDE GDWR: Type: IMPLANTABLE DEVICE | Site: URETER | Status: FUNCTIONAL

## 2023-05-09 DEVICE — VARIABLE LENGTH INJECTION STENT SET
Type: IMPLANTABLE DEVICE | Site: URETER | Status: FUNCTIONAL
Brand: CONTOUR VL™ INJECTION STENT SET

## 2023-05-09 RX ORDER — PROPOFOL 10 MG/ML
INJECTION, EMULSION INTRAVENOUS AS NEEDED
Status: DISCONTINUED | OUTPATIENT
Start: 2023-05-09 | End: 2023-05-09

## 2023-05-09 RX ORDER — ONDANSETRON 2 MG/ML
INJECTION INTRAMUSCULAR; INTRAVENOUS AS NEEDED
Status: DISCONTINUED | OUTPATIENT
Start: 2023-05-09 | End: 2023-05-09

## 2023-05-09 RX ORDER — MAGNESIUM HYDROXIDE 1200 MG/15ML
LIQUID ORAL AS NEEDED
Status: DISCONTINUED | OUTPATIENT
Start: 2023-05-09 | End: 2023-05-09 | Stop reason: HOSPADM

## 2023-05-09 RX ORDER — MAGNESIUM HYDROXIDE/ALUMINUM HYDROXICE/SIMETHICONE 120; 1200; 1200 MG/30ML; MG/30ML; MG/30ML
30 SUSPENSION ORAL ONCE
Status: COMPLETED | OUTPATIENT
Start: 2023-05-09 | End: 2023-05-09

## 2023-05-09 RX ORDER — LIDOCAINE HYDROCHLORIDE 10 MG/ML
INJECTION, SOLUTION EPIDURAL; INFILTRATION; INTRACAUDAL; PERINEURAL AS NEEDED
Status: DISCONTINUED | OUTPATIENT
Start: 2023-05-09 | End: 2023-05-09

## 2023-05-09 RX ORDER — SODIUM CHLORIDE, SODIUM LACTATE, POTASSIUM CHLORIDE, CALCIUM CHLORIDE 600; 310; 30; 20 MG/100ML; MG/100ML; MG/100ML; MG/100ML
INJECTION, SOLUTION INTRAVENOUS CONTINUOUS PRN
Status: DISCONTINUED | OUTPATIENT
Start: 2023-05-09 | End: 2023-05-09

## 2023-05-09 RX ORDER — FENTANYL CITRATE 50 UG/ML
INJECTION, SOLUTION INTRAMUSCULAR; INTRAVENOUS AS NEEDED
Status: DISCONTINUED | OUTPATIENT
Start: 2023-05-09 | End: 2023-05-09

## 2023-05-09 RX ORDER — SODIUM CHLORIDE 9 MG/ML
INJECTION, SOLUTION INTRAVENOUS CONTINUOUS PRN
Status: DISCONTINUED | OUTPATIENT
Start: 2023-05-09 | End: 2023-05-09

## 2023-05-09 RX ORDER — ONDANSETRON 2 MG/ML
4 INJECTION INTRAMUSCULAR; INTRAVENOUS ONCE AS NEEDED
Status: DISCONTINUED | OUTPATIENT
Start: 2023-05-09 | End: 2023-05-09 | Stop reason: HOSPADM

## 2023-05-09 RX ORDER — MIDAZOLAM HYDROCHLORIDE 2 MG/2ML
INJECTION, SOLUTION INTRAMUSCULAR; INTRAVENOUS AS NEEDED
Status: DISCONTINUED | OUTPATIENT
Start: 2023-05-09 | End: 2023-05-09

## 2023-05-09 RX ORDER — SODIUM CHLORIDE, SODIUM LACTATE, POTASSIUM CHLORIDE, CALCIUM CHLORIDE 600; 310; 30; 20 MG/100ML; MG/100ML; MG/100ML; MG/100ML
50 INJECTION, SOLUTION INTRAVENOUS CONTINUOUS
Status: DISPENSED | OUTPATIENT
Start: 2023-05-09 | End: 2023-05-10

## 2023-05-09 RX ORDER — SODIUM CHLORIDE, SODIUM LACTATE, POTASSIUM CHLORIDE, CALCIUM CHLORIDE 600; 310; 30; 20 MG/100ML; MG/100ML; MG/100ML; MG/100ML
20 INJECTION, SOLUTION INTRAVENOUS CONTINUOUS
Status: DISCONTINUED | OUTPATIENT
Start: 2023-05-09 | End: 2023-05-09

## 2023-05-09 RX ORDER — FENTANYL CITRATE/PF 50 MCG/ML
50 SYRINGE (ML) INJECTION
Status: DISCONTINUED | OUTPATIENT
Start: 2023-05-09 | End: 2023-05-09 | Stop reason: HOSPADM

## 2023-05-09 RX ORDER — DEXAMETHASONE SODIUM PHOSPHATE 10 MG/ML
INJECTION, SOLUTION INTRAMUSCULAR; INTRAVENOUS AS NEEDED
Status: DISCONTINUED | OUTPATIENT
Start: 2023-05-09 | End: 2023-05-09

## 2023-05-09 RX ORDER — CEFAZOLIN SODIUM 1 G/50ML
1000 SOLUTION INTRAVENOUS
Status: COMPLETED | OUTPATIENT
Start: 2023-05-09 | End: 2023-05-09

## 2023-05-09 RX ADMIN — SODIUM CHLORIDE, SODIUM LACTATE, POTASSIUM CHLORIDE, AND CALCIUM CHLORIDE 50 ML/HR: .6; .31; .03; .02 INJECTION, SOLUTION INTRAVENOUS at 16:14

## 2023-05-09 RX ADMIN — PROPOFOL 50 MG: 10 INJECTION, EMULSION INTRAVENOUS at 13:16

## 2023-05-09 RX ADMIN — DOCUSATE SODIUM 100 MG: 100 CAPSULE, LIQUID FILLED ORAL at 17:07

## 2023-05-09 RX ADMIN — CEFAZOLIN SODIUM 1000 MG: 1 SOLUTION INTRAVENOUS at 13:16

## 2023-05-09 RX ADMIN — OXYBUTYNIN CHLORIDE 15 MG: 5 TABLET, EXTENDED RELEASE ORAL at 21:11

## 2023-05-09 RX ADMIN — Medication 250 MG: at 09:10

## 2023-05-09 RX ADMIN — FENTANYL CITRATE 25 MCG: 50 INJECTION, SOLUTION INTRAMUSCULAR; INTRAVENOUS at 13:33

## 2023-05-09 RX ADMIN — FOLIC ACID 1 MG: 1 TABLET ORAL at 09:10

## 2023-05-09 RX ADMIN — DOCUSATE SODIUM 100 MG: 100 CAPSULE, LIQUID FILLED ORAL at 09:10

## 2023-05-09 RX ADMIN — MIDAZOLAM HYDROCHLORIDE 1 MG: 1 INJECTION, SOLUTION INTRAMUSCULAR; INTRAVENOUS at 13:10

## 2023-05-09 RX ADMIN — VENLAFAXINE 75 MG: 37.5 TABLET ORAL at 09:10

## 2023-05-09 RX ADMIN — SODIUM CHLORIDE, SODIUM LACTATE, POTASSIUM CHLORIDE, AND CALCIUM CHLORIDE: .6; .31; .03; .02 INJECTION, SOLUTION INTRAVENOUS at 13:09

## 2023-05-09 RX ADMIN — ALUMINUM HYDROXIDE, MAGNESIUM HYDROXIDE, AND DIMETHICONE 30 ML: 200; 20; 200 SUSPENSION ORAL at 17:07

## 2023-05-09 RX ADMIN — MIDAZOLAM HYDROCHLORIDE 1 MG: 1 INJECTION, SOLUTION INTRAMUSCULAR; INTRAVENOUS at 13:12

## 2023-05-09 RX ADMIN — PANTOPRAZOLE SODIUM 40 MG: 40 TABLET, DELAYED RELEASE ORAL at 05:21

## 2023-05-09 RX ADMIN — VENLAFAXINE 75 MG: 37.5 TABLET ORAL at 20:37

## 2023-05-09 RX ADMIN — VENLAFAXINE 75 MG: 37.5 TABLET ORAL at 16:21

## 2023-05-09 RX ADMIN — ARIPIPRAZOLE 20 MG: 5 TABLET ORAL at 09:10

## 2023-05-09 RX ADMIN — SODIUM CHLORIDE: 9 INJECTION, SOLUTION INTRAVENOUS at 13:35

## 2023-05-09 RX ADMIN — CALCIUM ACETATE 667 MG: 667 CAPSULE ORAL at 16:21

## 2023-05-09 RX ADMIN — OXYCODONE HYDROCHLORIDE 5 MG: 5 TABLET ORAL at 01:37

## 2023-05-09 RX ADMIN — FENTANYL CITRATE 50 MCG: 50 INJECTION, SOLUTION INTRAMUSCULAR; INTRAVENOUS at 13:30

## 2023-05-09 RX ADMIN — CEFEPIME 1000 MG: 1 INJECTION, POWDER, FOR SOLUTION INTRAMUSCULAR; INTRAVENOUS at 21:18

## 2023-05-09 RX ADMIN — Medication 250 MG: at 17:07

## 2023-05-09 RX ADMIN — OXYCODONE HYDROCHLORIDE 5 MG: 5 TABLET ORAL at 20:37

## 2023-05-09 RX ADMIN — LIDOCAINE HYDROCHLORIDE 30 MG: 10 INJECTION, SOLUTION EPIDURAL; INFILTRATION; INTRACAUDAL; PERINEURAL at 13:16

## 2023-05-09 RX ADMIN — FENTANYL CITRATE 25 MCG: 50 INJECTION, SOLUTION INTRAMUSCULAR; INTRAVENOUS at 13:37

## 2023-05-09 RX ADMIN — DEXAMETHASONE SODIUM PHOSPHATE 8 MG: 10 INJECTION, SOLUTION INTRAMUSCULAR; INTRAVENOUS at 13:35

## 2023-05-09 RX ADMIN — ONDANSETRON 4 MG: 2 INJECTION INTRAMUSCULAR; INTRAVENOUS at 13:41

## 2023-05-09 NOTE — TELEPHONE ENCOUNTER
Established patient with chronic right ureteral stent managed by Dr Richard Gastelum  Patient was seen as an inpatient for acute renal failure  Her right stent was exchanged and a left stent was placed  Both of these will be permanent and need to be maintained chronically  Please schedule bilateral ureteral stent exchange with Dr Richard Gastelum in 3 months

## 2023-05-09 NOTE — PROGRESS NOTES
Progress Note - Nephrology   Ragini Melendez 74 y o  female MRN: [id number]  Unit/Bed#: S -01 Encounter: [id number]      Assessment:  The patient presents with MONROE from her outpatient nephrologist, with outpatient labs showing Cr elevation to 4 26, and anion gap of 17 with bicarb of 12 on the day of presentation  Her MONROE may be secondary to her poor oral intake over the past week, or to her urinary retention as evidenced by bladder scan of 240 mL and hydronephrosis on imaging  Creatinine is improving, however it remains elevated suggesting possible worsening of baseline renal function since prior outpatient labs  Hematuria and bladder wall thickening with her urinalysis of innumerable WBC/RBCs and moderate bacteria raise suspicion for UTI/cystitis  Hydronephrosis is of unclear cause, however ureteral stenting will be tried today per urology to avoid the need for nephrostomy tubes  Plan:  1  MONROE  -Patient presented with creatinine of 4 26 on outpatient labs 5/4, POA creatinine 4 10  -Now stabilizing near creatinine of 3 0 - 3 20  -Baseline creatinine 0 9 - 1 1  -Avoid nephrotoxic agents, IV contrast, NSAIDs  -Repeat renal ultrasound demonstrated slightly worsened bilateral hydronephrosis, urology planning for bilateral ureteral stents today   -History of right-sided nephrostomy tube  -Etiology of left hydronephrosis is unclear so far  -Monitor BMP daily  2  Metabolic acidosis  -POA VBG: pH 7 207, PCO2 18 5, PO2 86 7, HCO3 7 2   -Acidosis improved to 7 309 after initiation of bicarb IVF  -Bicarb and anion gap stable      3  Hemorrhagic cystitis  -Urine output is yellow/cloudy on 5/8   -Urology on board  -On antibiotics per primary team   -Following urine culture positive for greater than 100,000 Staph aureus, though this likely represents colonization rather than an acute infectious process   Defer to primary team and urology for final treatment decision      4  CKD stage III  -Avoid nephrotoxic agents, IV contrast, NSAIDs   -Baseline creatinine 0 9-1 1, most recent outpatient labs in March showed elevated creatinine of 1 60   -Creatinine appears to be stabilizing near 3 0 - 3 2, which may represent progression of her CKD rather than any acute changes      5   Nephrotic range proteinuria  -The patient has been following with Enolia Model with nephrology, who has been working up proteinuria  -Urine albumin to creatinine ratio has been steadily rising since June 2022, most recently elevated to  -Suspected to be secondary to bevacizumab, which was held on 9/9/2022, though additional work-up has been pursued  -CHERI 1: 80, dsDNA negative, no complementemia, hepatitis B/hepatitis C/HIV negative, SPEP negative x2, serum free light chain ratio 1 5, RF negative  -Pending outpatient labs to follow from 5/4/2023: Phospholipase A2 receptor antibodies, methylmalonic acid level,  protein electrophoresis serum, cryoglobulin level  -Immunoglobulin free LT chains: IgG kappa to 48 5, Ig lambda 137 3, kappa/lambda ratio 1 81 (elevated)  -Plan to repeat free light chain level in steady state   -Avoiding RAAS inhibition due to low systolic blood pressure as outpatient   -Outpatient plan was to assess kidney function with nuclear medicine scan to determine which kidney to biopsy   -Considering her significant proteinuria and decreased kidney function, renal biopsy will need to be considered at this point to determine if the cause it treatable as soon as possible, though may need to postpone this until treatment for UTI is complete      6   Hyperphosphatemia  -Continuing PhosLo phosphate binder 667 3 times daily with meals  -Monitor phosphorus, 5 1 on 5/9/23      7   Hypomagnesemia  -Repletion and monitoring as per primary team   -Improved to 2 2 on 5/6      8  Endometrial cancer (in remission)  -Status post rucaparib, atezolizumab, and bevacizumab, from December 2020 - December 2022    Bevacizumab stopped September 2022   -Currently "monitoring for recurrence   -Had previously involved the right ureter causing stenosis, leading to her current ureteral stent  Subjective: The patient reports no acute concerns this morning  She states that she had recurrence of her right costovertebral angle pain last night  She reports no current CVA pain, and only soreness when palpated  She denies any fevers or chills, abdominal pain, nausea, vomiting, shortness of breath or chest pain  We discussed the possibility of renal biopsy and agreed to continue the discussion after we see if the ureteral stent procedure improves her renal function  Objective:     Vitals: Blood pressure 105/64, pulse 69, temperature 97 8 °F (36 6 °C), resp  rate 17, height 4' 11\" (1 499 m), SpO2 98 %, not currently breastfeeding  ,Body mass index is 23 43 kg/m²  Weight (last 2 days)     None            Intake/Output Summary (Last 24 hours) at 5/9/2023 1028  Last data filed at 5/9/2023 0701  Gross per 24 hour   Intake --   Output 800 ml   Net -800 ml            Physical Exam:   Physical Exam  Vitals and nursing note reviewed  Constitutional:       General: She is not in acute distress  Appearance: She is well-developed and normal weight  She is not toxic-appearing  HENT:      Head: Normocephalic and atraumatic  Nose: Nose normal  No rhinorrhea  Mouth/Throat:      Mouth: Mucous membranes are moist    Eyes:      General: No scleral icterus  Extraocular Movements: Extraocular movements intact  Conjunctiva/sclera: Conjunctivae normal    Cardiovascular:      Rate and Rhythm: Normal rate and regular rhythm  Pulses: Normal pulses  Heart sounds: No murmur heard  Pulmonary:      Effort: Pulmonary effort is normal  No respiratory distress  Breath sounds: Normal breath sounds  No wheezing or rales  Abdominal:      General: There is no distension  Palpations: Abdomen is soft  There is no mass  Tenderness:  There is no abdominal " tenderness  There is right CVA tenderness  There is no left CVA tenderness or guarding  Comments: States that her CVA on the right is sore to the touch, though not exquisitely tender  Genitourinary:     Comments: Urine output this morning appeared yellow/cloudy and no clots  Slight pink tinge  Musculoskeletal:         General: No swelling  Cervical back: Neck supple  Right lower leg: No edema  Left lower leg: No edema  Skin:     General: Skin is warm and dry  Capillary Refill: Capillary refill takes less than 2 seconds  Neurological:      Mental Status: She is alert and oriented to person, place, and time  Psychiatric:         Mood and Affect: Mood normal          Behavior: Behavior normal          Thought Content: Thought content normal            Lab, Imaging and other studies:   I have personally reviewed pertinent labs    CBC:   Lab Results   Component Value Date    WBC 13 19 (H) 05/09/2023    HGB 7 8 (L) 05/09/2023    HCT 26 0 (L) 05/09/2023    MCV 96 05/09/2023     05/09/2023    MCH 28 9 05/09/2023    MCHC 30 0 (L) 05/09/2023    RDW 14 4 05/09/2023    MPV 10 1 05/09/2023     CMP:   Lab Results   Component Value Date    SODIUM 136 05/09/2023    K 4 2 05/09/2023     05/09/2023    CO2 20 (L) 05/09/2023    BUN 52 (H) 05/09/2023    CREATININE 3 01 (H) 05/09/2023    CALCIUM 9 0 05/09/2023    EGFR 15 05/09/2023

## 2023-05-09 NOTE — PROGRESS NOTES
"Progress Note - Urology  Ragini Melendez 1955, 74 y o  female MRN: [id number]    Unit/Bed#: S -01 Encounter: [id number]      Assessment & Plan:  1  Bilateral hydronephrosis  -Ultrasound showing bilateral hydronephrosis, slightly worsened as compared to previous ultrasound on admission   -Bladder scan 154 yesterday  -Continue to monitor bladder scans to ensure patient is not in retention   -Vital signs stable, afebrile  -Creatinine 3 01, 3 25 yesterday  Nephrology following, patient likely has glomerular disease  Planning for renal biopsy  -WBC 13 19, 12 01 yesterday   -Patient on cefepime  -Discussed with patient right ureteral stent exchange, insertion of left ureteral stent, retrograde pyelogram   Discussed risks associated with anesthesia, bleeding, infection, damage to kidneys, ureter, bladder, need for further urologic procedures  Discussed risk of stent failure, and future need for nephrostomy tube placement  Patient understands the risks and wishes to proceed  -Consent signed  -Proceed to the OR    Subjective:   HPI: Patient seen at bedside  She denies any complaints  Review of Systems   Constitutional: Negative for chills and fever  Respiratory: Negative for cough and shortness of breath  Cardiovascular: Negative for chest pain and palpitations  Gastrointestinal: Negative for abdominal pain and vomiting  Genitourinary: Negative for dysuria and hematuria  Musculoskeletal: Negative for arthralgias and back pain  Skin: Negative for color change and rash  Neurological: Negative for seizures and syncope  All other systems reviewed and are negative  Objective:    Vitals: Blood pressure 105/64, pulse 69, temperature 97 8 °F (36 6 °C), resp  rate 17, height 4' 11\" (1 499 m), SpO2 100 %, not currently breastfeeding  ,Body mass index is 23 43 kg/m²  Physical Exam  Vitals reviewed  Constitutional:       General: She is not in acute distress       Appearance: Normal " appearance  She is normal weight  She is not ill-appearing, toxic-appearing or diaphoretic  HENT:      Head: Normocephalic and atraumatic  Right Ear: External ear normal       Left Ear: External ear normal       Nose: Nose normal       Mouth/Throat:      Mouth: Mucous membranes are moist    Eyes:      General: No scleral icterus  Conjunctiva/sclera: Conjunctivae normal    Cardiovascular:      Rate and Rhythm: Normal rate and regular rhythm  Pulses: Normal pulses  Heart sounds: Normal heart sounds  No murmur heard  No friction rub  No gallop  Pulmonary:      Effort: Pulmonary effort is normal  No respiratory distress  Breath sounds: Normal breath sounds  No stridor  No wheezing, rhonchi or rales  Abdominal:      General: Abdomen is flat  There is no distension  Palpations: Abdomen is soft  Tenderness: There is no abdominal tenderness  There is no guarding  Genitourinary:     Comments: PureWick in place draining yellow cloudy urine  Musculoskeletal:         General: Normal range of motion  Cervical back: Normal range of motion  Skin:     General: Skin is warm  Findings: No rash  Neurological:      General: No focal deficit present  Mental Status: She is alert and oriented to person, place, and time  Mental status is at baseline  Psychiatric:         Mood and Affect: Mood normal          Behavior: Behavior normal          Thought Content:  Thought content normal          Judgment: Judgment normal            Labs:  Recent Labs     05/07/23  0530 05/08/23  0515 05/09/23  0525   WBC 11 94* 12 01* 13 19*     Recent Labs     05/07/23  0530 05/08/23  0515 05/09/23  0525   HGB 7 7* 7 4* 7 8*     Recent Labs     05/07/23  0530 05/08/23  0515 05/09/23  0525   HCT 25 6* 24 5* 26 0*     Recent Labs     05/07/23  0530 05/08/23  0515 05/09/23  0525   CREATININE 3 15* 3 20* 3 01*         History:  Past Medical History:   Diagnosis Date   • Anxiety    • CKD (chronic kidney disease) stage 3, GFR 30-59 ml/min ([facility])    • COVID     Fall 2022   • Depression    • Endometrial cancer ([facility] [address] ) 12/2017   • GERD (gastroesophageal reflux disease)    • H/O gastric bypass    • Hard to intubate     pt denies   • History of chemotherapy     started 12/2021endometrial cancer- done 12/23/22   • History of DVT in adulthood     RLE   • Hyperglycemia     vx type 2 dm -- last assessed 4/1/14; resolved 11/7/17   • Hyperlipidemia    • Hypertension     in past- no meds at present   • Iron deficiency anemia     iron infusions weekly   • OAB (overactive bladder)    • Port-A-Cath in place    • Wears glasses      Social History     Socioeconomic History   • Marital status: Single     Spouse name: None   • Number of children: None   • Years of education: None   • Highest education level: None   Occupational History   • None   Tobacco Use   • Smoking status: Never   • Smokeless tobacco: Never   Vaping Use   • Vaping Use: Never used   Substance and Sexual Activity   • Alcohol use: Not Currently   • Drug use: No   • Sexual activity: Not Currently   Other Topics Concern   • None   Social History Narrative   • None     Social Determinants of Health     Financial Resource Strain: Not on file   Food Insecurity: Not on file   Transportation Needs: Not on file   Physical Activity: Not on file   Stress: Not on file   Social Connections: Not on file   Intimate Partner Violence: Not on file   Housing Stability: Not on file     Past Surgical History:   Procedure Laterality Date   • ABDOMINAL SURGERY      GASTRIC BYPASS; 2001   • BREAST BIOPSY Right 06/28/2019    core biopsy; benign   • CHOLECYSTECTOMY      at the time of gastric bypass   • COLONOSCOPY     • CT NEEDLE BIOPSY LYMPH NODE  07/08/2019   • FL GUIDED CENTRAL VENOUS ACCESS DEVICE INSERTION  11/12/2019   • FL RETROGRADE PYELOGRAM  3/30/2023   • GASTRIC BYPASS     • HYSTERECTOMY Bilateral 2017    total abdominal with salpingo-oophorectomy   • IR NEPHROSTOMY TO NEPHROURETERAL STENT  06/09/2022   • IR NEPHROSTOMY TO NEPHROURETERAL STENT  12/20/2022   • IR NEPHROSTOMY TUBE CHECK/CHANGE/REPOSITION/REINSERTION/UPSIZE  05/27/2022   • IR NEPHROSTOMY TUBE PLACEMENT  07/26/2019   • IR NEPHROURETERAL STENT CHECK/CHANGE/REPOSITION  04/06/2021   • IR NEPHROURETERAL STENT CHECK/CHANGE/REPOSITION  06/04/2021   • IR NEPHROURETERAL STENT CHECK/CHANGE/REPOSITION  09/03/2021   • IR NEPHROURETERAL STENT CHECK/CHANGE/REPOSITION  12/07/2021   • IR NEPHROURETERAL STENT CHECK/CHANGE/REPOSITION  03/08/2022   • IR NEPHROURETERAL STENT CHECK/CHANGE/REPOSITION  05/10/2022   • IR NEPHROURETERAL STENT CHECK/CHANGE/REPOSITION  09/19/2022   • IR PICC LINE  09/27/2019   • IR PORT PLACEMENT  07/26/2019   • IR PORT REMOVAL  09/20/2019   • OOPHORECTOMY Bilateral 2017   • AK CYSTO BLADDER W/URETERAL CATHETERIZATION Right 3/30/2023    Procedure: CYSTOSCOPY RETROGRADE PYELOGRAM WITH exchange of STENT URETERAL;  Surgeon: Debra Ayala MD;  [facility];  Service: Urology   • AK CYSTO W/INSERT URETERAL STENT Right 3/30/2023    Procedure: EXCHANGE STENT URETERAL;  Surgeon: Debra Ayala MD;  [facility];  Service: Urology   • AK INSJ TUNNELED CTR VAD W/SUBQ PORT AGE 5 YR/> Left 11/12/2019    Procedure: INSERTION VENOUS PORT ( PORT-A-CATH) IR;  Surgeon: Teofilo Mueller DO;  [facility];  Service: Interventional Radiology   • AK LAPS ABD PRTM&OMENTUM DX W/WO SPEC BR/WA SPX N/A 12/19/2017    Procedure: LAPAROSCOPY DIAGNOSTIC;  Surgeon: Olivia Bonner MD;  [facility];  Service: Gynecology Oncology   • AK LAPS W/RAD HYST W/BILAT LMPHADEC RMVL TUBE/OVARY N/A 12/19/2017    Procedure: HYSTERECTOMY LAPAROSCOPIC TOTAL ([address]) W/ ROBOTICS; BILATERAL SALPINGOOPHERECTOMY; LYMPH NODE DISSECTION; lysis of adhesions;  Surgeon: Olivia Bonner MD;  [facility];  Service: Gynecology Oncology   • TONSILLECTOMY     • US GUIDED BREAST BIOPSY RIGHT COMPLETE Right 06/28/2019     Family History   Problem Relation Age of Onset   • Hyperlipidemia Mother    • Heart disease Mother    • Ovarian cancer Mother 48   • Colon cancer Mother    • Lymphoma Father    • Breast cancer Sister 61   • No Known Problems Brother    • No Known Problems Brother    • No Known Problems Maternal Grandmother    • Bone cancer Maternal Grandfather    • Uterine cancer Paternal Grandmother    • No Known Problems Paternal Grandfather    • No Known Problems Maternal Aunt    • No Known Problems Maternal Aunt    • No Known Problems Maternal Aunt    • No Known Problems Maternal Aunt    • No Known Problems Paternal Aunt    • No Known Problems Paternal Aunt    • No Known Problems Paternal Aunt    • No Known Problems Paternal Aunt        Ghulam Osorio  Date: 5/9/2023 Time: 8:46 AM

## 2023-05-09 NOTE — ANESTHESIA PREPROCEDURE EVALUATION
Procedure:  CYSTOSCOPY RETROGRADE PYELOGRAM WITH INSERTION STENT URETERAL (Bilateral: Bladder)    Relevant Problems   CARDIO   (+) Acute deep vein thrombosis (DVT) of proximal vein of lower extremity (HCC)   (+) Benign essential hypertension   (+) Exertional dyspnea      /RENAL   (+) Acute on chronic kidney disease   (+) Chronic kidney disease, stage 3a (HCC)   (+) Nephrotoxicity      GYN   (+) Endometrial cancer (HCC)      HEMATOLOGY   (+) Anemia   (+) Drug-induced anemia   (+) Iron deficiency anemia secondary to inadequate dietary iron intake      MUSCULOSKELETAL   (+) Acute left-sided low back pain without sciatica   (+) Bilateral piriformis syndrome   (+) Myofascial pain syndrome      NEURO/PSYCH   (+) Chronic pain syndrome   (+) Continuous opioid dependence (HCC)   (+) Depression, recurrent (HCC)   (+) Encounter for follow-up surveillance of endometrial cancer   (+) History of DVT (deep vein thrombosis)   (+) Major depression, chronic   (+) Myofascial pain syndrome      PULMONARY   (+) Exertional dyspnea   1  Bilateral hydronephrosis  -Ultrasound showing bilateral hydronephrosis, slightly worsened as compared to previous ultrasound on admission   -Bladder scan 154 yesterday  -Continue to monitor bladder scans to ensure patient is not in retention   -Vital signs stable, afebrile  -Creatinine 3 01, 3 25 yesterday  Nephrology following, patient likely has glomerular disease  Planning for renal biopsy  -WBC 13 19, 12 01 yesterday   -Patient on cefepime  -Discussed with patient right ureteral stent exchange, insertion of left ureteral stent, retrograde pyelogram   Discussed risks associated with anesthesia, bleeding, infection, damage to kidneys, ureter, bladder, need for further urologic procedures  Discussed risk of stent failure, and future need for nephrostomy tube placement  Patient understands the risks and wishes to proceed       Physical Exam    Airway       Dental       Cardiovascular  Cardiovascular exam normal    Pulmonary  Pulmonary exam normal     Other Findings        Anesthesia Plan  ASA Score- 3     Anesthesia Type- general with ASA Monitors  Additional Monitors:   Airway Plan: LMA  Plan Factors-Exercise tolerance (METS): >4 METS  Chart reviewed  EKG reviewed  Imaging results reviewed  Existing labs reviewed  Patient summary reviewed  Patient is not a current smoker  Induction- intravenous  Postoperative Plan- Plan for postoperative opioid use  Informed Consent- Anesthetic plan and risks discussed with patient  I personally reviewed this patient with the CRNA  Discussed and agreed on the Anesthesia Plan with the CRNA  Wicho Cardoso

## 2023-05-09 NOTE — ASSESSMENT & PLAN NOTE
-  Baseline Cr: 0 9-1 1  - Prior CDK3a now 5   - Suspect 2/2 to UTI  Estimated Creatinine Clearance: 14 4 mL/min (A) (by C-G formula based on SCr of 3 01 mg/dL (H))    Cr-3 01 improving     Plan  Avoid nephrotoxic drugs  Avoid hypotension  Monitor BMP

## 2023-05-09 NOTE — CASE MANAGEMENT
Case Management Assessment    Patient name Berna Jansen  Location S /S -01 MRN [id number]  : 1955 Date 2023       Current Admission Date: 2023  Current Admission Diagnosis:Hemorrhagic cystitis w/ pyelonephritis   Patient Active Problem List    Diagnosis Date Noted   • Other proteinuria    • History of DVT (deep vein thrombosis) 2023   • Hemorrhagic cystitis w/ pyelonephritis 2023   • Hyperphosphatemia 2023   • Abnormal iron saturation 2023   • Iron deficiency anemia secondary to inadequate dietary iron intake 2023   • Dry mouth 2023   • Chronic kidney disease, stage 3a ([address] ) 2023   • Nephrotoxicity 10/03/2022   • Mucositis 10/03/2022   • Vaginal atrophy 2022   • Vulvar irritation 2022   • Drug-induced anemia 2022   • Burning with urination 2022   • Continuous opioid dependence ([address] ) 2022   • Depression, recurrent ([address] ) 2022   • Cachexia ([address] ) 10/06/2021   • Chronic UTI 2021   • Secondary malignant neoplasm of other specified sites ([address] ) 2021   • Closed nondisplaced fracture of right pubis with delayed healing 2021   • Exertional dyspnea 2021   • Need for follow-up by  2021   • Dental disorder 2021   • Constipation 2021   • Osteoporosis 2020   • Pathological fracture due to osteoporosis 2020   • Bilateral piriformis syndrome    • Chronic pain syndrome    • Myofascial pain syndrome    • Nephrostomy status ([address] ) 10/14/2020   • Overactive bladder 2020   • Acute left-sided low back pain without sciatica 2020   • Obstruction of right ureter 2020   • Hypomagnesemia 10/18/2019   • Ambulatory dysfunction 10/11/2019   • Severe protein-calorie malnutrition ([address] ) 10/03/2019   • Bilateral lower extremity edema 2019   • Generalized weakness 2019   • Acute on chronic kidney disease 2019   • Hypokalemia 2019   • Chemotherapy-induced nausea 09/18/2019   • Anemia 09/06/2019   • Chemotherapy induced neutropenia ([facility] ) 08/30/2019   • Cancer related pain 07/19/2019   • Encounter for central line care 07/16/2019   • Acute deep vein thrombosis (DVT) of proximal vein of lower extremity ([facility] ) 06/19/2019   • Breast cancer screening 06/19/2019   • Encounter for follow-up surveillance of endometrial cancer 03/17/2019   • Benign essential hypertension 12/19/2017   • Endometrial cancer [facility] ) 11/29/2017   • Dyslipidemia 04/01/2014   • Major depression, chronic 10/07/2013      LOS (days): 5  Geometric Mean LOS (GMLOS) (days): 3 10  Days to GMLOS:-1 6     OBJECTIVE:    Risk of Unplanned Readmission Score: 35 16         Current admission status: Inpatient       Preferred Pharmacy:   [facility]pharmacy [id number] [address] [facility]  [address] [place] [address]  Phone: [phone] Fax: [phone]    [id number] [facility], [address] [facility]  [address] [place], [place] [address] [address]  Phone: [phone] Fax: [phone]    [address] ([facility]) - [place], [address] [facility] 63  [address] [facility] [address]  Phone: [phone] Fax: [phone] [address] [place] [address]  Phone: [phone] Fax: [phone]    [facility] - [facility]  [address] [facility] [address]  Phone: [phone] Fax: [phone] [facility] [address] [facility] [address] [facility] 94 [facility] 38 210 [facility]  Phone: [phone] Fax: [phone]    [address]  [address] [place] [address]  Phone: [phone] Fax: [phone]    Primary Care Provider: Aubrie Carrillo DO    Primary Insurance: Loi Hogan [facility]  Secondary Insurance: ASSESSMENT:  Active Health Care Proxies     [address] [facility] Representative - Sister   Primary Phone: [phone] [facility])  Home Phone: [phone]                         Readmission Root Cause  30 Day Readmission: No    Patient Information  Admitted from[de-identified] Home  Mental Status: Alert  During Assessment patient was accompanied by: Not accompanied during assessment  Assessment information provided by[de-identified] Patient  Primary Caregiver: Self  Support Systems: Family members  [place] [facility] do you live in?: [address] [place] [address] entry access options   Select all that apply : Stairs  Number of steps to enter home : 3  Do the steps have railings?: Yes  In the last 12 months, was there a time when you were not able to pay the mortgage or rent on time?: No  In the last 12 months, was there a time when you did not have a steady place to sleep or slept in a shelter (including now)?: No  Homeless/housing insecurity resource given?: N/A  Living Arrangements: Other (Comment) (lives with family members)  Is patient a ?: No    Activities of Daily Living Prior to Admission  Functional Status: Independent  Completes ADLs independently?: Yes  Ambulates independently?: Yes  Does patient use assisted devices?: No  Does patient currently own DME?: No  Does patient have a history of Outpatient Therapy (PT/OT)?: Yes  Does the patient have a history of Short-Term Rehab?: No  Does patient have a history of HHC?: No  Does patient currently have [facility]?: No         Patient Information Continued  Income Source: SSI/SSD  Does patient have prescription coverage?: Yes  Within the past 12 months, you worried that your food would run out before you got the money to buy more : Never true  Within the past 12 months, the food you bought just didn't last and you didn't have money to get more : Never true  Food insecurity resource given?: N/A  Does patient receive dialysis treatments?: No  Does patient have a history of substance abuse?: No  Does patient have a history of Mental Health Diagnosis?: Yes  Is patient receiving treatment for mental health?: Yes  Has patient received inpatient treatment related to mental health in the last 2 years?: No         Means of Transportation  Means of Transport to Appts[de-identified] Drives Self  In the past 12 months, has lack of transportation kept you from medical appointments or from getting medications?: No  In the past 12 months, has lack of transportation kept you from meetings, work, or from getting things needed for daily living?: No  Was application for public transport provided?: N/A    CM reviewed the availability of treatment team to discuss questions or concerns patient and/or family may have regarding  understanding medications and recognizing signs and symptoms at discharge  CM also encouraged patient to follow up with all recommended appointments after discharge  CM reviewed the information that will be provided to pt/family on the discharge instructions  Patient advised of importance for patient and family to participate in managing patient's medical well being

## 2023-05-09 NOTE — OP NOTE
Operative Note     PATIENT:  Myrick Klinefelter (MRN [id number])    DATE OF PROCEDURE:   5/9/2023    PRE-OP DIAGNOSIS:   1) history of endometrial cancer  2) right ureteral obstruction  3) indwelling right ureteral stent  4  Left hydronephrosis  5  Acute renal failure    POST-OP DIAGNOSIS:   1) history of endometrial cancer  2) right ureteral obstruction  3) indwelling right ureteral stent  4  Left hydronephrosis  5  Acute renal failure  #6 complex upper urinary tract infection    PROCEDURES PERFORMED:  1) Cystoscopy  2) bilateral retrograde pyelography with fluoroscopic interpretation  3) Left ureteral stent placement  4) right ureteral stent exchange    SURGEON:  Sumaya Boswell MD    ASSISTANTS:  There were no qualified teaching residents to assist with this case    ANESTHESIA: General     COMPLICATIONS:   None    ANTIBIOTICS:  Ancef    INTRAOPERATIVE THROMBOEMBOLISM PROPHYLAXIS:  Pneumatic compression stockings      FINDINGS:  -Significant radiation changes are noted on cystoscopy  - Upon placement of a left ureteral stent there was prompt drainage of grossly purulent urine from the left kidney consistent with ongoing obstruction and infection  Sterile urine cultures were obtained  - The right ureteral stent was also exchanged and upsized to 7 [place]   -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    INDICATIONS FOR PROCEDURE:  Myrick Klinefelter is an 74 y o  old female with history of advanced endometrial cancer treated with surgery, radiation to the whole pelvis and chemotherapy  He subsequent developed stenosis of the right ureter and has been stent dependent for some time  She is admitted with acute renal failure with a creatinine of 4 from a baseline of 1 6  Repeat imaging demonstrates that the right stent appears to be in appropriate position but there is new mild left hydroureteronephrosis of unclear etiology     After discussing the options, the patient elected to undergo ureteral stent placement  We discussed the procedure in detail, the alternatives, and the risks, and they signed informed consent to proceed  PROCEDURE IN DETAIL:   The patient was identified and brought to the OR  Antibiotic prophylaxis and DVT prophylaxis were administered  They were placed in the comfortable dorsal lithotomy position with care to pad all pressure points  They were prepped and draped in the usual sterile fashion using hibiclens  A surgical time out was performed with all in the room in agreement with the correct patient, procedure, indications, and laterality  A 21-Micronesian rigid cystoscope was used to enter the bladder  The bladder was inspected in its entirety and there were no lesions noted  Significant radiation changes are noted  Urine inside the bladder is turbid despite ongoing antibiotics  The ureteral orifices were identified in their normal orthotopic positions  The Left ureteral orifice was identified and a 5 Fr open ended catheter was placed into the ureteral orifice  A retrograde pyelogram was performed with injection of 50/50 Isovue with the findings as described above  A Sensor wire was up to the kidney under fluoroscopic guidance  The open-ended catheter is navigated atop the pre-placed wire and advanced to the renal pelvis  A sterile urine culture was collected and submitted for analysis   the distance between the UVJ and the UPJ was measured and appropriately sized stent was selected  The JJ stent was then passed up the wire  under fluoroscopic guidance into the  kidney with a good curl noted in the kidney and in the bladder  The bladder was drained  The right ureteral stent is exchanged empirically  I again utilized a 7 [place] Karli stent ideally to improve drainage    As the distance between the UVJ and UPJ was very foreshortened on the right side I utilized a Percuflex multilength stent        The patient was placed back supine, awakened from general anesthesia and brought to recovery room in stable condition  SPECIMENS:   Order Name Source Comment Collection Info Order Time   URINE CULTURE Urine, Cystoscopic  Collected By: Xander Suggs MD 5/9/2023  1:36 PM     Release to patient through [employer]   Immediate             IMPLANTS:   Implant Name Type Inv  Item Serial No   Lot No  LRB No  Used Action   STENT URETERAL 7FR Esperanza Pedroza GDWR - [id number]  STENT URETERAL 7FR 24CM [facility] [id number] Left 1 Implanted   STENT CONTOUR INJ 7FR 30CM - [id number]  STENT CONTOUR INJ 7FR 30CM  [employer]ivun Hadash [id number] Right 1 Implanted   STENT URETERAL 6FR 22CM INLAY OPTIMA W/O GW - [id number]  STENT URETERAL 6FR 22CM INLAY OPTIMA W/O [address] [facility] [id number] Right 1 Explanted        COMPLICATIONS: None    DISPOSITION: PACU    PLAN: Findings the time of surgery reviewed with the patient's sister and the primary team   I am very optimistic that today's procedure will improve her renal function  Sifuentes catheter can be removed when her renal function approaches its baseline  Continue antibiotics per primary team     Assuming her kidney function improves I will request follow-up in 3 months for ureteral stent exchange with Dr Handy Smart

## 2023-05-09 NOTE — PROGRESS NOTES
[facility]  Progress Note  Name: Pinky Aparicio  MRN: [id number]  Unit/Bed#: S -55 I Date of Admission: 5/4/2023   Date of Service: 5/9/2023 I Hospital Day: 5    Assessment/Plan   * Hemorrhagic cystitis w/ pyelonephritis  Assessment & Plan  - Patient sent to hospital by Nephro for MONROE and metabolic acidosis discovered on recent labs - followed by Nephrology for chronic cystitis, kidney disease w/ proteinuria (HTN, obstructive uropathy, underlying glomerular pathology, age-related nephron loss pathology)    - CT abd revealing no obstructive uropathy, mild left hydronephrosis and hydrorureter (pyelonephritis not excluded), persistent circumferential bladder wall thickening possibly indicating cystitis as well as severe constipation  Renal US- Redemonstration of bilateral hydronephrosis which appears slightly worsened as compared to ultrasound of 5/4/2023  There is echogenic debris within the bilateral collecting systems as well as the bladder which may represent cellular debris versus blood products, Stable positioning of right ureteral stent, Nonobstructing left renal calculus measuring 5 mm, Redemonstration of circumferential urinary bladder wall thickening compatible with chronic urinary retention versus cystitis   There is a tiny focus of air within suggested within the bladder as can be seen in the setting of cystitis versus recent   catheterization/instrumentation       Plan   Appreciate nephrology recommendations- Treatment for urinary tract infection which does not appear to be pyelonephritis but would need to make sure she could have a biopsy with this ongoing, Creatinine plateau    Continue cefepime for now   UA culture-Staph aureus  Urinary retention protocol  Appreciate Urology recommendations- Bilateral stent placement/exchange today, monitor renal function     Acute on chronic kidney disease  Assessment & Plan  -  Baseline Cr: 0 9-1 1  - Prior CDK3a now 5   - Suspect 2/2 to UTI  Estimated Creatinine Clearance: 14 4 mL/min (A) (by C-G formula based on SCr of 3 01 mg/dL (H))  Cr-3 01 improving     Plan  Avoid nephrotoxic drugs  Avoid hypotension  Monitor BMP    History of DVT (deep vein thrombosis)  Assessment & Plan  - Patient with DVT in 2017  - Currently on Lovenox 80 mg daily  VAS US-negative    Plan  Continue heparin DVT prophylaxis    Hyperphosphatemia  Assessment & Plan  - Phosphate of 8 4 on admission  - In setting of acute on chronic disease     Plan  -Continue PhosLo     Constipation  Assessment & Plan  - Sever constipation noted on CT abdomen  - Patient reports no BM in over a week  - Patient without any abdominal pain/tenderness or distention     Plan-  Resume regular diet after procedure  Bisacodyl suppository PRN    Severe protein-calorie malnutrition (HCC)  Assessment & Plan  Malnutrition Findings:   Adult Malnutrition type: Chronic illness  Adult Degree of Malnutrition: Other severe protein calorie malnutrition  Malnutrition Characteristics: Muscle loss, Fat loss                  360 Statement: inadequate intake related to poor appetite as evidenced by dark sunken orbital, wasted temporalis, buccal pads, hollow supraclavicular space, wasted interosseous  Int: Advance diet to regular, as medically indicated,  medical food supplements  BMI Findings: Body mass index is 23 43 kg/m²  Endometrial cancer [facility])  Assessment & Plan  - Diagnosis '14; s/p total abdominal hysterectomy and bilateral salpingo oophorectomy   - Status post ENDOBARR (rucaparib, atezolizumab, and bevacizumab) clinical trial started in 12/2020 and finished 12/2022  - She is currently under active surveillance           VTE Pharmacologic Prophylaxis: VTE Score: 6 High Risk (Score >/= 5) - Pharmacological DVT Prophylaxis Ordered: heparin  Sequential Compression Devices Ordered  Patient Centered Rounds: I performed bedside rounds with nursing staff today    Discussions with Specialists or Other Care Team Provider: Urology, Nephrology     Education and Discussions with Family / Patient: Updated  (sister) via phone  Current Length of Stay: 5 day(s)  Current Patient Status: Inpatient   Discharge Plan: Anticipate discharge in 24-48 hrs to home with home services  Code Status: Level 1 - Full Code    Subjective:   Patient was seen and examined at the bedside  Patient was resting comfortably no acute distress  Patient reports she feels well however last night she was experiencing some right-sided flank pain that is since improved  Patient denies fever, chills, abdominal pain, NVD, urinary symptoms, chest pain, shortness of breath, flank pain, or any other symptoms at this time  Objective:     Vitals:   Temp (24hrs), Av 9 °F (36 6 °C), Min:97 8 °F (36 6 °C), Max:98 °F (36 7 °C)    Temp:  [97 8 °F (36 6 °C)-98 °F (36 7 °C)] 97 8 °F (36 6 °C)  HR:  [69-73] 69  Resp:  [17-18] 17  BP: ()/(61-64) 105/64  SpO2:  [97 %-100 %] 100 %  Body mass index is 23 43 kg/m²  Input and Output Summary (last 24 hours): Intake/Output Summary (Last 24 hours) at 2023 0818  Last data filed at 2023 0701  Gross per 24 hour   Intake --   Output 1550 ml   Net -1550 ml       Physical Exam:   Physical Exam  Vitals and nursing note reviewed  Constitutional:       General: She is not in acute distress  Appearance: She is well-developed  She is ill-appearing  She is not toxic-appearing or diaphoretic  HENT:      Head: Normocephalic and atraumatic  Mouth/Throat:      Mouth: Mucous membranes are moist    Eyes:      Extraocular Movements: Extraocular movements intact  Conjunctiva/sclera: Conjunctivae normal       Pupils: Pupils are equal, round, and reactive to light  Cardiovascular:      Rate and Rhythm: Normal rate and regular rhythm  Pulses: Normal pulses  Heart sounds: Normal heart sounds  No murmur heard  No friction rub  No gallop  Pulmonary:      Effort: Pulmonary effort is normal  No respiratory distress  Breath sounds: Normal breath sounds  No wheezing or rhonchi  Chest:      Chest wall: No tenderness  Abdominal:      General: Bowel sounds are normal       Palpations: Abdomen is soft  Tenderness: There is no abdominal tenderness  There is right CVA tenderness  There is no left CVA tenderness, guarding or rebound  Musculoskeletal:         General: No tenderness  Normal range of motion  Cervical back: Neck supple  Right lower leg: No edema  Left lower leg: No edema  Skin:     General: Skin is warm and dry  Capillary Refill: Capillary refill takes less than 2 seconds  Neurological:      General: No focal deficit present  Mental Status: She is alert and oriented to person, place, and time  Cranial Nerves: No cranial nerve deficit  Sensory: No sensory deficit  Motor: No weakness  Coordination: Coordination normal    Psychiatric:         Mood and Affect: Mood normal          Behavior: Behavior normal          Thought Content: Thought content normal           Additional Data:     Labs:  Results from last 7 days   Lab Units 05/09/23  0525 05/06/23  0535 05/05/23  0537   WBC Thousand/uL 13 19*   < > 13 02*   HEMOGLOBIN g/dL 7 8*   < > 8 0*   HEMATOCRIT % 26 0*   < > 26 1*   PLATELETS Thousands/uL 253   < > 328   NEUTROS PCT %  --   --  85*   LYMPHS PCT %  --   --  5*   MONOS PCT %  --   --  8   EOS PCT %  --   --  0    < > = values in this interval not displayed       Results from last 7 days   Lab Units 05/09/23  0525 05/07/23  0530 05/06/23  0535   SODIUM mmol/L 136   < > 139   POTASSIUM mmol/L 4 2   < > 4 1   CHLORIDE mmol/L 105   < > 108   CO2 mmol/L 20*   < > 21   BUN mg/dL 52*   < > 65*   CREATININE mg/dL 3 01*   < > 3 15*   ANION GAP mmol/L 11   < > 10   CALCIUM mg/dL 9 0   < > 8 5   ALBUMIN g/dL  --   --  2 9*   TOTAL BILIRUBIN mg/dL  --   --  0 29   ALK PHOS U/L  --   --  90 ALT U/L  --   --  5*   AST U/L  --   --  6*   GLUCOSE RANDOM mg/dL 89   < > 85    < > = values in this interval not displayed  Results from last 7 days   Lab Units 05/08/23  0515   INR  1 03                   Lines/Drains:  Invasive Devices     Central Venous Catheter Line  Duration           Port A Cath 11/12/19 Left Chest 1273 days          Peripheral Intravenous Line  Duration           Peripheral IV 05/08/23 Right Forearm 1 day          Drain  Duration           Ureteral Internal Stent Right ureter 6 Fr  40 days                Central Line:  Goal for removal: N/A - Chronic PICC             Imaging: Reviewed radiology reports from this admission including: abdominal/pelvic CT and ultrasound(s)    Recent Cultures (last 7 days):   Results from last 7 days   Lab Units 05/04/23  2157 05/04/23  2156 05/04/23 2127   BLOOD CULTURE  No Growth After 4 Days  No Growth After 4 Days    --    URINE CULTURE   --   --  >100,000 cfu/ml Staphylococcus aureus*  >100,000 cfu/ml Staphylococcus aureus*       Last 24 Hours Medication List:   Current Facility-Administered Medications   Medication Dose Route Frequency Provider Last Rate   • acetaminophen  650 mg Oral Q4H PRN Mio Manning, DO     • ARIPiprazole  20 mg Oral Daily Mio Daniel Jay connelly DO     • bisacodyl  10 mg Rectal Once PRN May Connie Kaufman MD     • calcium acetate  667 mg Oral TID With Meals May Connie Franco MD     • cefepime  1,000 mg Intravenous Q24H May Connie Franco MD 1,000 mg (05/08/23 3386)   • docusate sodium  100 mg Oral BID May Connie Kaufman MD     • folic acid  1 mg Oral Daily Lucius Mathis, DO     • oxybutynin  15 mg Oral HS Lucius Mathis, DO     • oxyCODONE  5 mg Oral Q4H PRN Mio Daniel Nigel, DO     • oxyCODONE  2 5 mg Oral Q4H PRN Mioamanda Manning, DO     • pantoprazole  40 mg Oral Early Morning Mio Daniel Nigel, DO     • polyethylene glycol  17 g Oral Daily Lucius Mathis, DO     • saccharomyces boulardii  250 mg Oral BID Bran connelly DO     • venlafaxine  75 mg Oral TID Elisabeth Elizabeth DO          Today, Patient Was Seen By: Alec Schaefer MD    **Please Note: This note may have been constructed using a voice recognition system  **

## 2023-05-09 NOTE — ASSESSMENT & PLAN NOTE
- Sever constipation noted on CT abdomen  - Patient reports no BM in over a week  - Patient without any abdominal pain/tenderness or distention     Plan-  Resume regular diet after procedure  Bisacodyl suppository PRN

## 2023-05-09 NOTE — ASSESSMENT & PLAN NOTE
- Patient sent to hospital by Nephro for MONROE and metabolic acidosis discovered on recent labs - followed by Nephrology for chronic cystitis, kidney disease w/ proteinuria (HTN, obstructive uropathy, underlying glomerular pathology, age-related nephron loss pathology)    - CT abd revealing no obstructive uropathy, mild left hydronephrosis and hydrorureter (pyelonephritis not excluded), persistent circumferential bladder wall thickening possibly indicating cystitis as well as severe constipation  Renal US- Redemonstration of bilateral hydronephrosis which appears slightly worsened as compared to ultrasound of 5/4/2023  There is echogenic debris within the bilateral collecting systems as well as the bladder which may represent cellular debris versus blood products, Stable positioning of right ureteral stent, Nonobstructing left renal calculus measuring 5 mm, Redemonstration of circumferential urinary bladder wall thickening compatible with chronic urinary retention versus cystitis   There is a tiny focus of air within suggested within the bladder as can be seen in the setting of cystitis versus recent   catheterization/instrumentation       Plan   Appreciate nephrology recommendations- Treatment for urinary tract infection which does not appear to be pyelonephritis but would need to make sure she could have a biopsy with this ongoing, Creatinine plateau    Continue cefepime for now   UA culture-Staph aureus  Urinary retention protocol  Appreciate Urology recommendations- Bilateral stent placement/exchange today, monitor renal function

## 2023-05-09 NOTE — ANESTHESIA POSTPROCEDURE EVALUATION
Post-Op Assessment Note    CV Status:  Stable  Pain Score: 0    Pain management: adequate     Mental Status:  Alert and awake   Hydration Status:  Euvolemic and stable   PONV Controlled:  Controlled   Airway Patency:  Patent and adequate      Post Op Vitals Reviewed: Yes      Staff: CRNA         No notable events documented      BP  110/58    Temp      Pulse 110   Resp 12   SpO2 100%

## 2023-05-10 LAB
ANION GAP SERPL CALCULATED.3IONS-SCNC: 9 MMOL/L (ref 4–13)
BACTERIA BLD CULT: NORMAL
BACTERIA BLD CULT: NORMAL
BUN SERPL-MCNC: 47 MG/DL (ref 5–25)
CALCIUM SERPL-MCNC: 8.7 MG/DL (ref 8.4–10.2)
CHLORIDE SERPL-SCNC: 104 MMOL/L (ref 96–108)
CO2 SERPL-SCNC: 22 MMOL/L (ref 21–32)
CREAT SERPL-MCNC: 2.76 MG/DL (ref 0.6–1.3)
ERYTHROCYTE [DISTWIDTH] IN BLOOD BY AUTOMATED COUNT: 14.2 % (ref 11.6–15.1)
FOLATE SERPL-MCNC: >20 NG/ML (ref 3.1–17.5)
GFR SERPL CREATININE-BSD FRML MDRD: 17 ML/MIN/1.73SQ M
GLUCOSE SERPL-MCNC: 111 MG/DL (ref 65–140)
HCT VFR BLD AUTO: 29.4 % (ref 34.8–46.1)
HGB BLD-MCNC: 8.4 G/DL (ref 11.5–15.4)
MCH RBC QN AUTO: 28.8 PG (ref 26.8–34.3)
MCHC RBC AUTO-ENTMCNC: 28.6 G/DL (ref 31.4–37.4)
MCV RBC AUTO: 101 FL (ref 82–98)
PLATELET # BLD AUTO: 242 THOUSANDS/UL (ref 149–390)
PMV BLD AUTO: 9.9 FL (ref 8.9–12.7)
POTASSIUM SERPL-SCNC: 5.3 MMOL/L (ref 3.5–5.3)
RBC # BLD AUTO: 2.92 MILLION/UL (ref 3.81–5.12)
SODIUM SERPL-SCNC: 135 MMOL/L (ref 135–147)
VIT B12 SERPL-MCNC: 821 PG/ML (ref 100–900)
WBC # BLD AUTO: 22.44 THOUSAND/UL (ref 4.31–10.16)

## 2023-05-10 RX ORDER — BISACODYL 10 MG
10 SUPPOSITORY, RECTAL RECTAL ONCE
Status: COMPLETED | OUTPATIENT
Start: 2023-05-10 | End: 2023-05-10

## 2023-05-10 RX ORDER — HYDROMORPHONE HCL IN WATER/PF 6 MG/30 ML
0.2 PATIENT CONTROLLED ANALGESIA SYRINGE INTRAVENOUS EVERY 4 HOURS PRN
Status: DISCONTINUED | OUTPATIENT
Start: 2023-05-10 | End: 2023-05-13 | Stop reason: HOSPADM

## 2023-05-10 RX ADMIN — BISACODYL 10 MG: 10 SUPPOSITORY RECTAL at 10:29

## 2023-05-10 RX ADMIN — ARIPIPRAZOLE 20 MG: 5 TABLET ORAL at 08:14

## 2023-05-10 RX ADMIN — VENLAFAXINE 75 MG: 37.5 TABLET ORAL at 08:15

## 2023-05-10 RX ADMIN — CALCIUM ACETATE 667 MG: 667 CAPSULE ORAL at 12:03

## 2023-05-10 RX ADMIN — DOCUSATE SODIUM 100 MG: 100 CAPSULE, LIQUID FILLED ORAL at 08:15

## 2023-05-10 RX ADMIN — FOLIC ACID 1 MG: 1 TABLET ORAL at 08:15

## 2023-05-10 RX ADMIN — OXYBUTYNIN CHLORIDE 15 MG: 5 TABLET, EXTENDED RELEASE ORAL at 21:49

## 2023-05-10 RX ADMIN — VENLAFAXINE 75 MG: 37.5 TABLET ORAL at 16:22

## 2023-05-10 RX ADMIN — DOCUSATE SODIUM 100 MG: 100 CAPSULE, LIQUID FILLED ORAL at 18:36

## 2023-05-10 RX ADMIN — HYDROMORPHONE HYDROCHLORIDE 0.2 MG: 0.2 INJECTION, SOLUTION INTRAMUSCULAR; INTRAVENOUS; SUBCUTANEOUS at 00:33

## 2023-05-10 RX ADMIN — CALCIUM ACETATE 667 MG: 667 CAPSULE ORAL at 07:48

## 2023-05-10 RX ADMIN — Medication 250 MG: at 18:36

## 2023-05-10 RX ADMIN — CALCIUM ACETATE 667 MG: 667 CAPSULE ORAL at 16:22

## 2023-05-10 RX ADMIN — Medication 250 MG: at 08:15

## 2023-05-10 RX ADMIN — CEFEPIME 1000 MG: 1 INJECTION, POWDER, FOR SOLUTION INTRAMUSCULAR; INTRAVENOUS at 21:52

## 2023-05-10 RX ADMIN — PANTOPRAZOLE SODIUM 40 MG: 40 TABLET, DELAYED RELEASE ORAL at 06:02

## 2023-05-10 RX ADMIN — VENLAFAXINE 75 MG: 37.5 TABLET ORAL at 21:49

## 2023-05-10 RX ADMIN — POLYETHYLENE GLYCOL 3350 17 G: 17 POWDER, FOR SOLUTION ORAL at 08:15

## 2023-05-10 NOTE — ASSESSMENT & PLAN NOTE
- Sever constipation noted on CT abdomen  - Patient reports no BM in over a week  - Patient without any abdominal pain/tenderness or distention     Plan-  Resume regular diet  Bisacodyl suppository PRN

## 2023-05-10 NOTE — PROGRESS NOTES
Progress Note - Nephrology   Ragini Melendez 74 y o  female MRN: [id number]  Unit/Bed#: S -01 Encounter: [id number]      Assessment:  The patient presents with MONROE from her outpatient nephrologist, with outpatient labs showing Cr elevation to 4 26, and anion gap of 17 with bicarb of 12 on the day of presentation  Her MONROE may be secondary to her poor oral intake over the past week, or to her urinary retention as evidenced by bladder scan of 240 mL and hydronephrosis on imaging  Creatinine is improving after ureteral stent placement on left and replacement on right, and obstruction is hopefully relieved on the left after this procedure  Plan:  1  MONROE  -Patient presented with creatinine of 4 26 on outpatient labs 5/4, POA creatinine 4 10  -Now stabilizing near creatinine of 3 0 - 3 20  -Baseline creatinine 0 9 - 1 1  -Avoid nephrotoxic agents, IV contrast, NSAIDs   -Ureteral stenting from 5/9 demonstrated purulent drainage from the left ureteral stent when placed  -F/u urine culture from 5/9  -History of right-sided nephrostomy tube  -Left hydronephrosis appears secondary to obstruction, as relived by stent placement  -Monitor BMP daily      2  Metabolic acidosis  -POA VBG: pH 7 207, PCO2 18 5, PO2 86 7, HCO3 7 2   -Acidosis improved to 7 309 after initiation of bicarb IVF  -Bicarb and anion gap stable      3  Hemorrhagic cystitis  -Urine output is yellow and clear with white debris on 5/9    -Urology on board  -On antibiotics per primary team   -Following urine culture from 5/9 due to purulent drainage after stenting   Defer to primary team and urology for final treatment decision      4  CKD stage III  -Avoid nephrotoxic agents, IV contrast, NSAIDs   -Baseline creatinine 0 9-1 1, most recent outpatient labs in March showed elevated creatinine of 1 60   -Creatinine is now further improving today, suggesting further recovery of renal function      5   Nephrotic range proteinuria  -The patient has been following with Marion Todd with nephrology, who has been working up proteinuria  -Urine albumin to creatinine ratio has been steadily rising since June 2022, most recently elevated to  -Suspected to be secondary to bevacizumab, which was held on 9/9/2022, though additional work-up has been pursued  -CHERI 1: 80, dsDNA negative, no complementemia, hepatitis B/hepatitis C/HIV negative, SPEP negative x2, serum free light chain ratio 1 5, RF negative  -Pending outpatient labs to follow from 5/4/2023: , methylmalonic acid level,  protein electrophoresis serum,    -Immunoglobulin free LT chains: IgG kappa to 48 5, Ig lambda 137 3, kappa/lambda ratio 1 81 (elevated), cryoglobulin negative, Phospholipase A2 receptor antibodies <1 8   -Plan to repeat free light chain level in steady state   -Avoiding RAAS inhibition due to low systolic blood pressure as outpatient   -Outpatient plan was to assess kidney function with nuclear medicine scan to determine which kidney to biopsy   -Considering her significant proteinuria and decreased kidney function, renal biopsy will need to be considered at this point to determine if the cause it treatable as soon as possible, though may need to postpone this until treatment for UTI is complete      6   Hyperphosphatemia  -Continuing PhosLo phosphate binder 667 3 times daily with meals  -Monitor phosphorus, 5 1 on 5/9/23      7   Hypomagnesemia  -Repletion and monitoring as per primary team   -Improved to 2 2 on 5/6      8  Endometrial cancer (in remission)  -Status post rucaparib, atezolizumab, and bevacizumab, from December 2020 - December 2022  Bevacizumab stopped September 2022   -Currently monitoring for recurrence   -Had previously involved the right ureter causing stenosis, leading to her current ureteral stent  Subjective: The patient mentions worse right flank pain near the CVA overnight, requiring breakthrough pain medication    She states that since that time she has also felt "\"foggy\", though her pain is now at a tolerable level  She denies any nausea or vomiting, shortness of breath, chest pain, or abdominal pain  Objective:     Vitals: Blood pressure 115/74, pulse 91, temperature 97 9 °F (36 6 °C), resp  rate 18, height 4' 11\" (1 499 m), SpO2 98 %, not currently breastfeeding  ,Body mass index is 23 43 kg/m²  Weight (last 2 days)     None            Intake/Output Summary (Last 24 hours) at 5/10/2023 1013  Last data filed at 5/10/2023 0919  Gross per 24 hour   Intake 650 ml   Output 2400 ml   Net -1750 ml       Urethral Catheter Latex 20 Fr  (Active)   Reasons to continue Urinary Catheter  Post-operative urological requirements 05/09/23 2037   Site Assessment Clean;Skin intact 05/09/23 2037   Sifuentes Care Done 05/10/23 0900   Collection Container Standard drainage bag 05/09/23 2037   Securement Method Securing device (Describe) 05/09/23 2037   Output (mL) 1150 mL 05/09/23 2257       Physical Exam  Vitals reviewed  Constitutional:       General: She is not in acute distress  Appearance: She is well-developed  HENT:      Head: Normocephalic and atraumatic  Right Ear: External ear normal       Left Ear: External ear normal       Nose: Nose normal  No rhinorrhea  Mouth/Throat:      Mouth: Mucous membranes are moist       Pharynx: No oropharyngeal exudate or posterior oropharyngeal erythema  Eyes:      General: No scleral icterus  Conjunctiva/sclera: Conjunctivae normal    Cardiovascular:      Rate and Rhythm: Normal rate and regular rhythm  Pulses: Normal pulses  Heart sounds: Normal heart sounds  No murmur heard  Pulmonary:      Effort: Pulmonary effort is normal  No respiratory distress  Breath sounds: Normal breath sounds  No wheezing or rales  Abdominal:      General: There is no distension  Palpations: Abdomen is soft  Tenderness: There is no abdominal tenderness  There is right CVA tenderness   There is no left CVA tenderness or " guarding  Musculoskeletal:         General: No swelling  Cervical back: Neck supple  Right lower leg: No edema  Left lower leg: No edema  Skin:     General: Skin is warm and dry  Capillary Refill: Capillary refill takes less than 2 seconds  Neurological:      Mental Status: She is alert and oriented to person, place, and time  Psychiatric:         Mood and Affect: Mood normal          Behavior: Behavior normal          Thought Content: Thought content normal          Lab, Imaging and other studies:   I have personally reviewed pertinent labs    CBC:   Lab Results   Component Value Date    WBC 22 44 (H) 05/10/2023    HGB 8 4 (L) 05/10/2023    HCT 29 4 (L) 05/10/2023     (H) 05/10/2023     05/10/2023    MCH 28 8 05/10/2023    MCHC 28 6 (L) 05/10/2023    RDW 14 2 05/10/2023    MPV 9 9 05/10/2023     CMP:   Lab Results   Component Value Date    SODIUM 135 05/10/2023    K 5 3 05/10/2023     05/10/2023    CO2 22 05/10/2023    BUN 47 (H) 05/10/2023    CREATININE 2 76 (H) 05/10/2023    CALCIUM 8 7 05/10/2023    EGFR 17 05/10/2023

## 2023-05-10 NOTE — PROGRESS NOTES
[facility]  Progress Note  Name: Deny Monreal  MRN: [id number]  Unit/Bed#: S -73 I Date of Admission: 5/4/2023   Date of Service: 5/10/2023 I Hospital Day: 6    Assessment/Plan   * Hemorrhagic cystitis w/ pyelonephritis  Assessment & Plan  - Patient sent to hospital by Nephro for MONROE and metabolic acidosis discovered on recent labs - followed by Nephrology for chronic cystitis, kidney disease w/ proteinuria (HTN, obstructive uropathy, underlying glomerular pathology, age-related nephron loss pathology)    - CT abd revealing no obstructive uropathy, mild left hydronephrosis and hydrorureter (pyelonephritis not excluded), persistent circumferential bladder wall thickening possibly indicating cystitis as well as severe constipation  Renal US- Redemonstration of bilateral hydronephrosis which appears slightly worsened as compared to ultrasound of 5/4/2023  There is echogenic debris within the bilateral collecting systems as well as the bladder which may represent cellular debris versus blood products, Stable positioning of right ureteral stent, Nonobstructing left renal calculus measuring 5 mm, Redemonstration of circumferential urinary bladder wall thickening compatible with chronic urinary retention versus cystitis   There is a tiny focus of air within suggested within the bladder as can be seen in the setting of cystitis versus recent   catheterization/instrumentation       Plan   Appreciate nephrology recommendations- Treatment for urinary tract infection which does not appear to be pyelonephritis but would need to make sure she could have a biopsy with this ongoing, Creatinine plateau    Continue cefepime for now   UA culture-Staph aureus  Urinary retention protocol  Appreciate Urology recommendations- Bilateral stent placement/exchange yesterday, monitor renal function in hopes to remove manley and complete voiding trial today     Acute on chronic kidney disease  Assessment & Plan  -  Baseline Cr: 0 9-1 1  - Prior CDK3a now 5   - Suspect 2/2 to UTI  Estimated Creatinine Clearance: 15 7 mL/min (A) (by C-G formula based on SCr of 2 76 mg/dL (H))  Cr- 2 76 improving     Plan  Avoid nephrotoxic drugs  Avoid hypotension  Monitor BMP    History of DVT (deep vein thrombosis)  Assessment & Plan  - Patient with DVT in 2017  - Currently on Lovenox 80 mg daily  VAS US-negative    Plan  Continue heparin DVT prophylaxis    Hyperphosphatemia  Assessment & Plan  - Phosphate of 8 4 on admission  - In setting of acute on chronic disease     Plan  -Continue PhosLo     Constipation  Assessment & Plan  - Sever constipation noted on CT abdomen  - Patient reports no BM in over a week  - Patient without any abdominal pain/tenderness or distention     Plan-  Resume regular diet  Bisacodyl suppository PRN    Severe protein-calorie malnutrition (HCC)  Assessment & Plan  Malnutrition Findings:   Adult Malnutrition type: Chronic illness  Adult Degree of Malnutrition: Other severe protein calorie malnutrition  Malnutrition Characteristics: Muscle loss, Fat loss                  360 Statement: inadequate intake related to poor appetite as evidenced by dark sunken orbital, wasted temporalis, buccal pads, hollow supraclavicular space, wasted interosseous  Int: Advance diet to regular, as medically indicated,  medical food supplements  BMI Findings: Body mass index is 23 43 kg/m²  Endometrial cancer [facility])  Assessment & Plan  - Diagnosis '14; s/p total abdominal hysterectomy and bilateral salpingo oophorectomy   - Status post ENDOBARR (rucaparib, atezolizumab, and bevacizumab) clinical trial started in 12/2020 and finished 12/2022  - She is currently under active surveillance               VTE Pharmacologic Prophylaxis: VTE Score: 6 High Risk (Score >/= 5) - Pharmacological DVT Prophylaxis Ordered: heparin  Sequential Compression Devices Ordered      Patient Centered Rounds: I performed bedside rounds with nursing staff today  Discussions with Specialists or Other Care Team Provider: Nephrology, urology    Education and Discussions with Family / Patient: Updated  (sister) via phone  Current Length of Stay: 6 day(s)  Current Patient Status: Inpatient   Discharge Plan: Anticipate discharge in 24-48 hrs to discharge location to be determined pending rehab evaluations  Code Status: Level 1 - Full Code    Subjective:   Patient was seen and examined at the bedside  Patient was resting comfortably no acute distress  Overnight nursing reported that patient was experiencing right-sided flank pain which required some Dilaudid which helped with the pain  Upon my examination this morning patient was still complaining of right flank pain  Patient denies fever, chills, chest pain, shortness of breath, abdominal pain, NVD, urinary symptoms, or any other symptoms at this time  Objective:     Vitals:   Temp (24hrs), Av 7 °F (36 5 °C), Min:97 °F (36 1 °C), Max:98 °F (36 7 °C)    Temp:  [97 °F (36 1 °C)-98 °F (36 7 °C)] 97 9 °F (36 6 °C)  HR:  [] 91  Resp:  [14-20] 18  BP: ()/(56-74) 115/74  SpO2:  [92 %-99 %] 98 %  Body mass index is 23 43 kg/m²  Input and Output Summary (last 24 hours): Intake/Output Summary (Last 24 hours) at 5/10/2023 0912  Last data filed at 5/10/2023 0749  Gross per 24 hour   Intake 650 ml   Output 1350 ml   Net -700 ml       Physical Exam:   Physical Exam  Vitals and nursing note reviewed  Constitutional:       General: She is not in acute distress  Appearance: She is well-developed  She is ill-appearing  She is not toxic-appearing or diaphoretic  HENT:      Head: Normocephalic and atraumatic  Mouth/Throat:      Mouth: Mucous membranes are moist    Eyes:      Extraocular Movements: Extraocular movements intact  Conjunctiva/sclera: Conjunctivae normal       Pupils: Pupils are equal, round, and reactive to light     Cardiovascular:      Rate and Rhythm: Normal rate and regular rhythm  Pulses: Normal pulses  Heart sounds: Normal heart sounds  No murmur heard  No friction rub  No gallop  Pulmonary:      Effort: Pulmonary effort is normal  No respiratory distress  Breath sounds: Normal breath sounds  No wheezing or rhonchi  Chest:      Chest wall: No tenderness  Abdominal:      General: Bowel sounds are normal       Palpations: Abdomen is soft  Tenderness: There is no abdominal tenderness  There is right CVA tenderness  There is no left CVA tenderness, guarding or rebound  Musculoskeletal:         General: No tenderness  Normal range of motion  Cervical back: Neck supple  Right lower leg: No edema  Left lower leg: No edema  Skin:     General: Skin is warm and dry  Capillary Refill: Capillary refill takes less than 2 seconds  Neurological:      General: No focal deficit present  Mental Status: She is alert and oriented to person, place, and time  Cranial Nerves: No cranial nerve deficit  Sensory: No sensory deficit  Motor: No weakness  Coordination: Coordination normal    Psychiatric:         Mood and Affect: Mood normal          Behavior: Behavior normal          Thought Content: Thought content normal           Additional Data:     Labs:  Results from last 7 days   Lab Units 05/10/23  0500 05/06/23  0535 05/05/23  0537   WBC Thousand/uL 22 44*   < > 13 02*   HEMOGLOBIN g/dL 8 4*   < > 8 0*   HEMATOCRIT % 29 4*   < > 26 1*   PLATELETS Thousands/uL 242   < > 328   NEUTROS PCT %  --   --  85*   LYMPHS PCT %  --   --  5*   MONOS PCT %  --   --  8   EOS PCT %  --   --  0    < > = values in this interval not displayed       Results from last 7 days   Lab Units 05/10/23  0645 05/07/23  0530 05/06/23  0535   SODIUM mmol/L 135   < > 139   POTASSIUM mmol/L 5 3   < > 4 1   CHLORIDE mmol/L 104   < > 108   CO2 mmol/L 22   < > 21   BUN mg/dL 47*   < > 65*   CREATININE mg/dL 2 76*   < > 3 15* ANION GAP mmol/L 9   < > 10   CALCIUM mg/dL 8 7   < > 8 5   ALBUMIN g/dL  --   --  2 9*   TOTAL BILIRUBIN mg/dL  --   --  0 29   ALK PHOS U/L  --   --  90   ALT U/L  --   --  5*   AST U/L  --   --  6*   GLUCOSE RANDOM mg/dL 111   < > 85    < > = values in this interval not displayed  Results from last 7 days   Lab Units 05/08/23  0515   INR  1 03                   Lines/Drains:  Invasive Devices     Central Venous Catheter Line  Duration           Port A Cath 11/12/19 Left Chest 1274 days          Peripheral Intravenous Line  Duration           Peripheral IV 05/08/23 Right Forearm 2 days          Drain  Duration           Ureteral Internal Stent Right ureter 6 Fr  41 days    Ureteral Internal Stent Left ureter 7 Fr  <1 day    Ureteral Internal Stent Right ureter 7 Fr  <1 day    Urethral Catheter Latex 20 Fr  <1 day              Urinary Catheter:  Goal for removal: Will do voiding trial today         Central Line:  Goal for removal: N/A - Chronic PICC             Imaging: Reviewed radiology reports from this admission including: abdominal/pelvic CT and ultrasound(s)    Recent Cultures (last 7 days):   Results from last 7 days   Lab Units 05/04/23 2157 05/04/23 2156 05/04/23 2127   BLOOD CULTURE  No Growth After 5 Days  No Growth After 5 Days    --    URINE CULTURE   --   --  >100,000 cfu/ml Staphylococcus aureus*  >100,000 cfu/ml Staphylococcus aureus*       Last 24 Hours Medication List:   Current Facility-Administered Medications   Medication Dose Route Frequency Provider Last Rate   • acetaminophen  650 mg Oral Q4H PRN Dulce Kamara MD     • ARIPiprazole  20 mg Oral Daily Dulce Kamara MD     • bisacodyl  10 mg Rectal Once PRN Dulce Kamara MD     • bisacodyl  10 mg Rectal Once Dulce Kamara MD     • calcium acetate  667 mg Oral TID With Meals Dulce Kamara MD     • cefepime  1,000 mg Intravenous Q24H Dulce Kamara MD 1,000 mg (05/09/23 2118)   • docusate sodium  100 mg Oral BID Dulce Kamara MD     • folic acid  1 mg Oral Daily Malu Cantu MD     • HYDROmorphone  0 2 mg Intravenous Q4H PRN Clovis Rodriguez MD     • oxybutynin  15 mg Oral HS Malu Cantu MD     • oxyCODONE  5 mg Oral Q4H PRN Malu Cantu MD     • oxyCODONE  2 5 mg Oral Q4H PRN Malu Cantu MD     • pantoprazole  40 mg Oral Early Morning Malu Cantu MD     • polyethylene glycol  17 g Oral Daily Malu Cantu MD     • saccharomyces boulardii  250 mg Oral BID Malu Cantu MD     • venlafaxine  75 mg Oral TID Malu Cantu MD          Today, Patient Was Seen By: Malu Cantu MD    **Please Note: This note may have been constructed using a voice recognition system  **

## 2023-05-10 NOTE — QUICK NOTE
Pt in severe pain despite oxycodone administration  Pt give dilaudid IV for breakthrough pain for 2 doses PRN

## 2023-05-10 NOTE — ASSESSMENT & PLAN NOTE
-  Baseline Cr: 0 9-1 1  - Prior CDK3a now 5   - Suspect 2/2 to UTI  Estimated Creatinine Clearance: 15 7 mL/min (A) (by C-G formula based on SCr of 2 76 mg/dL (H))    Cr- 2 76 improving     Plan  Avoid nephrotoxic drugs  Avoid hypotension  Monitor BMP

## 2023-05-10 NOTE — ASSESSMENT & PLAN NOTE
- Patient sent to hospital by Nephro for MONROE and metabolic acidosis discovered on recent labs - followed by Nephrology for chronic cystitis, kidney disease w/ proteinuria (HTN, obstructive uropathy, underlying glomerular pathology, age-related nephron loss pathology)    - CT abd revealing no obstructive uropathy, mild left hydronephrosis and hydrorureter (pyelonephritis not excluded), persistent circumferential bladder wall thickening possibly indicating cystitis as well as severe constipation  Renal US- Redemonstration of bilateral hydronephrosis which appears slightly worsened as compared to ultrasound of 5/4/2023  There is echogenic debris within the bilateral collecting systems as well as the bladder which may represent cellular debris versus blood products, Stable positioning of right ureteral stent, Nonobstructing left renal calculus measuring 5 mm, Redemonstration of circumferential urinary bladder wall thickening compatible with chronic urinary retention versus cystitis   There is a tiny focus of air within suggested within the bladder as can be seen in the setting of cystitis versus recent   catheterization/instrumentation       Plan   Appreciate nephrology recommendations- Treatment for urinary tract infection which does not appear to be pyelonephritis but would need to make sure she could have a biopsy with this ongoing, Creatinine plateau    Continue cefepime for now   UA culture-Staph aureus  Urinary retention protocol  Appreciate Urology recommendations- Bilateral stent placement/exchange yesterday, monitor renal function in hopes to remove manley and complete voiding trial today

## 2023-05-10 NOTE — PROGRESS NOTES
Progress Note - Urology  Flakito Geronimo 1955, 74 y o  female MRN: [id number]    Unit/Bed#: S -01 Encounter: [id number]      Assessment & Plan:  1  Bilateral hydronephrosis  -Status post bilateral retrograde pyelogram, left ureteral stent placement, right ureteral stent exchange   -Patient with significant radiation changes on cystoscopy, when placing left ureteral stent there is drainage of grossly purulent urine from left kidney consistent with obstruction and infection  Urine cultures were obtained   -Sifuentes catheter placed after procedure  Sifuentes catheter draining purulent white/yellow urine  Maintain Sifuentes catheter at this time, monitor Cr and WBC, likely removal tomorrow   -Patient reports abdominal pain due to lack of bowel movement in the past 2 days  Bowel regimen per primary team   -Right flank pain  Stent discomfort  Oxybutynin, Flomax, pain regimen for stent discomfort  -Vital signs stable, afebrile  -Previous urine cultures Staph aureus  -WBC 22 44, yesterday 13 19  -Creatinine 2 76, yesterday 3 01  Continue to trend  Baseline 0 9-1 1  Nephrology following   -Continue medical optimization antibiosis per primary team   -Urology will continue to follow    Subjective:   HPI: Patient seen at bedside this morning  Reports constipation  Denies any fever, chills, shortness of breath, chest pain  Review of Systems   Constitutional: Negative for chills and fever  Respiratory: Negative for cough and shortness of breath  Cardiovascular: Negative for chest pain and palpitations  Gastrointestinal: Positive for abdominal pain  Negative for vomiting  Genitourinary: Positive for flank pain  Negative for dysuria and hematuria  Musculoskeletal: Negative for arthralgias and back pain  Skin: Negative for color change and rash  Neurological: Negative for seizures and syncope  All other systems reviewed and are negative        Objective:  Vitals: Blood pressure 115/74, pulse 91, "temperature 97 9 °F (36 6 °C), resp  rate 18, height 4' 11\" (1 499 m), SpO2 98 %, not currently breastfeeding  ,Body mass index is 23 43 kg/m²  Physical Exam  Vitals reviewed  Constitutional:       General: She is not in acute distress  Appearance: Normal appearance  She is normal weight  She is ill-appearing  She is not toxic-appearing or diaphoretic  HENT:      Head: Normocephalic and atraumatic  Right Ear: External ear normal       Left Ear: External ear normal       Nose: Nose normal       Mouth/Throat:      Mouth: Mucous membranes are moist    Eyes:      General: No scleral icterus  Conjunctiva/sclera: Conjunctivae normal    Cardiovascular:      Rate and Rhythm: Normal rate  Pulses: Normal pulses  Pulmonary:      Effort: Pulmonary effort is normal    Abdominal:      General: Abdomen is flat  There is no distension  Palpations: Abdomen is soft  Tenderness: There is no abdominal tenderness  There is no right CVA tenderness, left CVA tenderness or guarding  Musculoskeletal:         General: Normal range of motion  Cervical back: Normal range of motion  Skin:     General: Skin is warm  Findings: No rash  Neurological:      General: No focal deficit present  Mental Status: She is alert and oriented to person, place, and time  Mental status is at baseline  Psychiatric:         Mood and Affect: Mood normal          Behavior: Behavior normal          Thought Content:  Thought content normal          Judgment: Judgment normal            Labs:  Recent Labs     05/08/23  0515 05/09/23  0525 05/10/23  0500   WBC 12 01* 13 19* 22 44*     Recent Labs     05/08/23  0515 05/09/23  0525 05/10/23  0500   HGB 7 4* 7 8* 8 4*     Recent Labs     05/08/23  0515 05/09/23  0525 05/10/23  0500   HCT 24 5* 26 0* 29 4*     Recent Labs     05/08/23  0515 05/09/23  0525 05/10/23  0645   CREATININE 3 20* 3 01* 2 76*       History:  Past Medical History:   Diagnosis Date   • Anxiety  " • CKD (chronic kidney disease) stage 3, GFR 30-59 ml/min ([facility])    • COVID     Fall 2022   • Depression    • Endometrial cancer ([facility] [address] ) 12/2017   • GERD (gastroesophageal reflux disease)    • H/O gastric bypass    • Hard to intubate     pt denies   • History of chemotherapy     started 12/2021endometrial cancer- done 12/23/22   • History of DVT in adulthood     RLE   • Hyperglycemia     vx type 2 dm -- last assessed 4/1/14; resolved 11/7/17   • Hyperlipidemia    • Hypertension     in past- no meds at present   • Iron deficiency anemia     iron infusions weekly   • OAB (overactive bladder)    • Port-A-Cath in place    • Wears glasses      Social History     Socioeconomic History   • Marital status: Single     Spouse name: None   • Number of children: None   • Years of education: None   • Highest education level: None   Occupational History   • None   Tobacco Use   • Smoking status: Never   • Smokeless tobacco: Never   Vaping Use   • Vaping Use: Never used   Substance and Sexual Activity   • Alcohol use: Not Currently   • Drug use: No   • Sexual activity: Not Currently   Other Topics Concern   • None   Social History Narrative   • None     Social Determinants of Health     Financial Resource Strain: Not on file   Food Insecurity: No Food Insecurity   • Worried About Running Out of Food in the Last Year: Never true   • Ran Out of Food in the Last Year: Never true   Transportation Needs: No Transportation Needs   • Lack of Transportation (Medical): No   • Lack of Transportation (Non-Medical):  No   Physical Activity: Not on file   Stress: Not on file   Social Connections: Not on file   Intimate Partner Violence: Not on file   Housing Stability: Unknown   • Unable to Pay for Housing in the Last Year: No   • Number of Places Lived in the Last Year: Not on file   • Unstable Housing in the Last Year: No     Past Surgical History:   Procedure Laterality Date   • ABDOMINAL SURGERY      GASTRIC BYPASS; 2001   • BREAST BIOPSY Right 06/28/2019    core biopsy; benign   • CHOLECYSTECTOMY      at the time of gastric bypass   • COLONOSCOPY     • CT NEEDLE BIOPSY LYMPH NODE  07/08/2019   • FL GUIDED CENTRAL VENOUS ACCESS DEVICE INSERTION  11/12/2019   • FL RETROGRADE PYELOGRAM  3/30/2023   • GASTRIC BYPASS     • HYSTERECTOMY Bilateral 2017    total abdominal with salpingo-oophorectomy   • IR NEPHROSTOMY TO NEPHROURETERAL STENT  06/09/2022   • IR NEPHROSTOMY TO NEPHROURETERAL STENT  12/20/2022   • IR NEPHROSTOMY TUBE CHECK/CHANGE/REPOSITION/REINSERTION/UPSIZE  05/27/2022   • IR NEPHROSTOMY TUBE PLACEMENT  07/26/2019   • IR NEPHROURETERAL STENT CHECK/CHANGE/REPOSITION  04/06/2021   • IR NEPHROURETERAL STENT CHECK/CHANGE/REPOSITION  06/04/2021   • IR NEPHROURETERAL STENT CHECK/CHANGE/REPOSITION  09/03/2021   • IR NEPHROURETERAL STENT CHECK/CHANGE/REPOSITION  12/07/2021   • IR NEPHROURETERAL STENT CHECK/CHANGE/REPOSITION  03/08/2022   • IR NEPHROURETERAL STENT CHECK/CHANGE/REPOSITION  05/10/2022   • IR NEPHROURETERAL STENT CHECK/CHANGE/REPOSITION  09/19/2022   • IR PICC LINE  09/27/2019   • IR PORT PLACEMENT  07/26/2019   • IR PORT REMOVAL  09/20/2019   • OOPHORECTOMY Bilateral 2017   • TX CYSTO BLADDER W/URETERAL CATHETERIZATION Right 3/30/2023    Procedure: CYSTOSCOPY RETROGRADE PYELOGRAM WITH exchange of STENT URETERAL;  Surgeon: Steven Leal MD;  [facility];  Service: Urology   • TX CYSTO BLADDER W/URETERAL CATHETERIZATION Bilateral 5/9/2023    Procedure: CYSTOSCOPY, BILATERAL RETROGRADE PYELOGRAM, WITH LEFT INSERTION STENT URETERAL, RIGHT [address];  Surgeon: Cortney Mitchell MD;  [facility];  Service: Urology   • TX CYSTO W/INSERT URETERAL STENT Right 3/30/2023    Procedure: EXCHANGE STENT URETERAL;  Surgeon: Steven Leal MD;  [facility];  Service: Urology   • TX INSJ TUNNELED CTR VAD W/SUBQ PORT AGE 5 YR/> Left 11/12/2019    Procedure: INSERTION VENOUS PORT ( PORT-A-CATH) IR;  Surgeon: Re Pablo DO;  [facility];  Service: Interventional Radiology   • KY LAPS ABD PRTM&OMENTUM DX W/WO SPEC BR/WA SPX N/A 12/19/2017    Procedure: LAPAROSCOPY DIAGNOSTIC;  Surgeon: Robina Lee MD;  [facility];  Service: Gynecology Oncology   • [place] LAPS W/RAD HYST W/BILAT LMPHADEC RMVL TUBE/OVARY N/A 12/19/2017    Procedure: HYSTERECTOMY LAPAROSCOPIC TOTAL ([address]) W/ ROBOTICS; BILATERAL SALPINGOOPHERECTOMY; LYMPH NODE DISSECTION; lysis of adhesions;  Surgeon: Robina Lee MD;  [facility];  Service: Gynecology Oncology   • TONSILLECTOMY     • US GUIDED BREAST BIOPSY RIGHT COMPLETE Right 06/28/2019     Family History   Problem Relation Age of Onset   • Hyperlipidemia Mother    • Heart disease Mother    • Ovarian cancer Mother 48   • Colon cancer Mother    • Lymphoma Father    • Breast cancer Sister 61   • No Known Problems Brother    • No Known Problems Brother    • No Known Problems Maternal Grandmother    • Bone cancer Maternal Grandfather    • Uterine cancer Paternal Grandmother    • No Known Problems Paternal Grandfather    • No Known Problems Maternal Aunt    • No Known Problems Maternal Aunt    • No Known Problems Maternal Aunt    • No Known Problems Maternal Aunt    • No Known Problems Paternal Aunt    • No Known Problems Paternal Aunt    • No Known Problems Paternal Aunt    • No Known Problems Paternal Aunt        Tyree [place], [place]  Date: 5/10/2023 Time: 10:50 AM

## 2023-05-11 LAB
ANION GAP SERPL CALCULATED.3IONS-SCNC: 12 MMOL/L (ref 4–13)
BACTERIA UR CULT: ABNORMAL
BACTERIA UR CULT: ABNORMAL
BUN SERPL-MCNC: 45 MG/DL (ref 5–25)
CALCIUM SERPL-MCNC: 8.8 MG/DL (ref 8.4–10.2)
CHLORIDE SERPL-SCNC: 101 MMOL/L (ref 96–108)
CO2 SERPL-SCNC: 21 MMOL/L (ref 21–32)
CREAT SERPL-MCNC: 2.65 MG/DL (ref 0.6–1.3)
ERYTHROCYTE [DISTWIDTH] IN BLOOD BY AUTOMATED COUNT: 13.8 % (ref 11.6–15.1)
GFR SERPL CREATININE-BSD FRML MDRD: 18 ML/MIN/1.73SQ M
GLUCOSE SERPL-MCNC: 71 MG/DL (ref 65–140)
HCT VFR BLD AUTO: 27.4 % (ref 34.8–46.1)
HGB BLD-MCNC: 8.2 G/DL (ref 11.5–15.4)
MCH RBC QN AUTO: 28.8 PG (ref 26.8–34.3)
MCHC RBC AUTO-ENTMCNC: 29.9 G/DL (ref 31.4–37.4)
MCV RBC AUTO: 96 FL (ref 82–98)
METHYLMALONATE SERPL-SCNC: 188 NMOL/L (ref 0–378)
PLATELET # BLD AUTO: 266 THOUSANDS/UL (ref 149–390)
PMV BLD AUTO: 9.9 FL (ref 8.9–12.7)
POTASSIUM SERPL-SCNC: 4.4 MMOL/L (ref 3.5–5.3)
RBC # BLD AUTO: 2.85 MILLION/UL (ref 3.81–5.12)
SODIUM SERPL-SCNC: 134 MMOL/L (ref 135–147)
WBC # BLD AUTO: 17.63 THOUSAND/UL (ref 4.31–10.16)

## 2023-05-11 RX ORDER — ONDANSETRON 2 MG/ML
4 INJECTION INTRAMUSCULAR; INTRAVENOUS EVERY 4 HOURS PRN
Status: DISCONTINUED | OUTPATIENT
Start: 2023-05-11 | End: 2023-05-13 | Stop reason: HOSPADM

## 2023-05-11 RX ADMIN — VENLAFAXINE 75 MG: 37.5 TABLET ORAL at 16:36

## 2023-05-11 RX ADMIN — OXYBUTYNIN CHLORIDE 15 MG: 5 TABLET, EXTENDED RELEASE ORAL at 21:50

## 2023-05-11 RX ADMIN — VENLAFAXINE 75 MG: 37.5 TABLET ORAL at 21:50

## 2023-05-11 RX ADMIN — Medication 250 MG: at 09:26

## 2023-05-11 RX ADMIN — FOLIC ACID 1 MG: 1 TABLET ORAL at 09:26

## 2023-05-11 RX ADMIN — ARIPIPRAZOLE 20 MG: 5 TABLET ORAL at 09:25

## 2023-05-11 RX ADMIN — VENLAFAXINE 75 MG: 37.5 TABLET ORAL at 09:26

## 2023-05-11 RX ADMIN — Medication 250 MG: at 18:03

## 2023-05-11 RX ADMIN — PANTOPRAZOLE SODIUM 40 MG: 40 TABLET, DELAYED RELEASE ORAL at 06:05

## 2023-05-11 RX ADMIN — CALCIUM ACETATE 667 MG: 667 CAPSULE ORAL at 16:36

## 2023-05-11 RX ADMIN — DOCUSATE SODIUM 100 MG: 100 CAPSULE, LIQUID FILLED ORAL at 09:25

## 2023-05-11 RX ADMIN — CALCIUM ACETATE 667 MG: 667 CAPSULE ORAL at 07:56

## 2023-05-11 RX ADMIN — POLYETHYLENE GLYCOL 3350 17 G: 17 POWDER, FOR SOLUTION ORAL at 09:26

## 2023-05-11 RX ADMIN — DOCUSATE SODIUM 100 MG: 100 CAPSULE, LIQUID FILLED ORAL at 18:03

## 2023-05-11 RX ADMIN — CEFEPIME 1000 MG: 1 INJECTION, POWDER, FOR SOLUTION INTRAMUSCULAR; INTRAVENOUS at 21:50

## 2023-05-11 RX ADMIN — CALCIUM ACETATE 667 MG: 667 CAPSULE ORAL at 12:58

## 2023-05-11 NOTE — PHYSICAL THERAPY NOTE
"   PT EVALUATION     05/11/23 1431   PT Last Visit   PT Visit Date 05/11/23   Note Type   Note type Evaluation   Pain Assessment   Pain Assessment Tool 0-10   Pain Score No Pain   Restrictions/Precautions   Other Precautions Contact/isolation; Fall Risk;Multiple lines  (SaO2 monitor; Sifuentes catheter)   Home Living   Type of [address] [place] [address] Two level;Bed/bath upstairs;Stairs to enter with rails  (4 steps to enter; stair glide to second-floor)   Home Equipment Stair glide  (Roller walker; 3 wheeled roller walker; cane)   Prior Function   Level of [place] Independent with ADLs; Independent with functional mobility; Independent with IADLS   Lives With Family  (42-year-old mother)   Receives Help From Family  (Sister)   IADLs Independent with driving; Independent with meal prep; Independent with medication management   Falls in the last 6 months 0   Vocational Retired  (Teacher)   Comments Patient normally ambulates without an assistive device prior to admission  Patient drives  Patient also uses a stair glide to the second floor prior to admission   General   Additional Pertinent History Patient is admitted with decreased appetite and fatigue  On 5/9/2023 patient underwent a cystoscopy with left ureteral stent and right ureteral stent exchange  Patient has a history of endometrial cancer   Family/Caregiver Present No   Cognition   Overall Cognitive Status WFL   Arousal/Participation Cooperative   Attention Within functional limits   Orientation Level Oriented X4   Following Commands Follows all commands and directions without difficulty   Comments At least 2 patient identifiers including name and date of birth   Subjective   Subjective \"I cannot wait to get rid of this bag  \"  Patient is referring to the Sifuentes catheter   RLE Assessment   RLE Assessment WFL  (3-3+/5 grossly)   LLE Assessment   LLE Assessment WFL  (3-3+/5 grossly)   Bed Mobility   Supine to Sit 5  Supervision   Additional items Assist x " 1;Verbal cues; Increased time required; Bedrails   Transfers   Sit to Stand 5  Supervision   Additional items Assist x 1;Verbal cues; Increased time required  (Cues for safe hand placement)   Stand to Sit 5  Supervision   Additional items Assist x 1;Verbal cues; Increased time required;Armrests  (Cues for safe hand placement)   Ambulation/Elevation   Gait pattern Short stride;Decreased foot clearance; Step through pattern  (Decreased gait speed)   Gait Assistance   (Close supervision progressing to supervision)   Additional items Assist x 1;Verbal cues; Tactile cues   Assistive Device Rolling walker   Distance 20 feet with change in direction in patient's room; patient easily fatigues with limited activity; Clines further or additional ambulation   Balance   Static Sitting Good   Static Standing Fair  (With roller walker)   Ambulatory Fair  (With roller walker)   Endurance Deficit   Endurance Deficit Yes   Endurance Deficit Description Limited standing and gait tolerance   Activity Tolerance   Activity Tolerance Patient limited by fatigue;Treatment limited secondary to medical complications (Comment)  (Weakness and deconditioning)   Assessment   Problem List Decreased strength;Decreased endurance; Impaired balance;Decreased mobility; Decreased safety awareness   Assessment Patient seen for Physical Therapy evaluation  Patient admitted with Hemorrhagic cystitis  Comorbidities affecting patient's physical performance include: Endometrial cancer, acute on CKD, severe protein calorie malnutrition, hypertension, history of acute DVT, anemia, generalized weakness, bilateral lower extremity edema, ambulatory dysfunction, pathological fracture due to osteoporosis, exertional dyspnea, closed nondisplaced fracture of right pubis, cachexia    Personal factors affecting patient at time of initial evaluation include: lives in two story house, stairs to enter home, inability to navigate community distances, inability to navigate level surfaces without external assistance, inability to perform dynamic tasks in community and limited home support  Prior to admission, patient was independent with functional mobility without assistive device, independent with ADLS, independent with IADLS, living with 79 yo mother in a two level home with 4 steps to enter, ambulating household distance and ambulating community distances  Please find objective findings from Physical Therapy assessment regarding body systems outlined above with impairments and limitations including weakness, impaired balance, decreased endurance, gait deviations, decreased activity tolerance, decreased functional mobility tolerance, decreased safety awareness and fall risk  The Barthel Index was used as a functional outcome tool presenting with a score of Barthel Index Score: 45 today indicating marked limitations of functional mobility and ADLS  Patient's clinical presentation is currently unstable/unpredictable as seen in patient's presentation of vital sign response, changing level of pain, increased fall risk, new onset of impairment of functional mobility, decreased endurance and new onset of weakness  Pt would benefit from continued Physical Therapy treatment to address deficits as defined above and maximize level of functional mobility  As demonstrated by objective findings, the assigned level of complexity for this evaluation is high  The patient's AM-PAC Basic Mobility Inpatient Short Form Raw Score is 18  A Raw score of greater than 16 suggests the patient may benefit from discharge to home  Please also refer to the recommendation of the Physical Therapist for safe discharge planning  Goals   Patient Goals To get rid of this bag and go home   STG Expiration Date 05/21/23   Short Term Goal #1 Patient will:  Increase bilateral LE strength 1 grade to facilitate independent mobility, Perform all bed mobility tasks independently to improve pt's independence w/ repositioning for decrease risk of skin breakdown, Perform all transfers independently consistently from various height surfaces in order to improve I w/ engagement w/ real-world environments/situations, Ambulate at least 300+ ft  with least restrictive assistive device or no assistive device independently w/o LOB to facilitate return and engagement w/ previous living environment, Navigate 4 stairs independently with unilateral handrail to either improve independence w/ entering home and/or so patient can fully access living areas in home, Increase ambulatory and static and dynamic standing balance 1 grade to decrease risk for falls, Tolerate at least 15 consecutive minutes of activity to demonstrate improved activity tolerance and endurance and Tolerate 3 hr OOB to faciliate upright tolerance   Plan   Treatment/Interventions ADL retraining;Functional transfer training;LE strengthening/ROM; Elevations; Therapeutic exercise; Endurance training;Patient/family training;Equipment eval/education; Bed mobility;Gait training; Compensatory technique education;OT;Spoke to case management   PT Frequency 3-5x/wk   Recommendation   PT Discharge Recommendation Home with home health rehabilitation   Equipment Recommended   (Patient has a roller walker, a 3 wheeled walker and a cane)   AM-PAC Basic Mobility Inpatient   Turning in Flat Bed Without Bedrails 4   Lying on Back to Sitting on Edge of Flat Bed Without Bedrails 3   Moving Bed to Chair 3   Standing Up From Chair Using Arms 3   Walk in Room 3   Climb 3-5 Stairs With Railing 2   Basic Mobility Inpatient Raw Score 18   Basic Mobility Standardized Score 41 05   Highest Level Of Mobility   JH-HLM Goal 6: Walk 10 steps or more   JH-HLM Achieved 6: Walk 10 steps or more   Barthel Index   Feeding 10   Bathing 0   Grooming Score 5   Dressing Score 5   Bladder Score 0   Bowels Score 5   Toilet Use Score 5   Transfers (Bed/Chair) Score 10   Mobility (Level Surface) Score 0   Stairs Score 5   Barthel Index Score 45   End of Consult   Patient Position at End of Consult Bedside chair; All needs within reach   [facility] Insurance Number  [address] [place] [address] [id number]   Portions of the documentation may have been created using voice recognition software  Occasional wrong word or sound alike substitutions may have occurred due to the inherent limitation of the voice recognition software  Read the chart carefully and recognize, using context, where substitutions have occurred

## 2023-05-11 NOTE — PLAN OF CARE
Problem: PHYSICAL THERAPY ADULT  Goal: Performs mobility at highest level of function for planned discharge setting  See evaluation for individualized goals  Description: Treatment/Interventions: ADL retraining, Functional transfer training, LE strengthening/ROM, Elevations, Therapeutic exercise, Endurance training, Patient/family training, Equipment eval/education, Bed mobility, Gait training, Compensatory technique education, OT, Spoke to case management  Equipment Recommended:  (Patient has a roller walker, a 3 wheeled walker and a cane)       See flowsheet documentation for full assessment, interventions and recommendations  Note:    Problem List: Decreased strength, Decreased endurance, Impaired balance, Decreased mobility, Decreased safety awareness  Assessment: Patient seen for Physical Therapy evaluation  Patient admitted with Hemorrhagic cystitis  Comorbidities affecting patient's physical performance include: Endometrial cancer, acute on CKD, severe protein calorie malnutrition, hypertension, history of acute DVT, anemia, generalized weakness, bilateral lower extremity edema, ambulatory dysfunction, pathological fracture due to osteoporosis, exertional dyspnea, closed nondisplaced fracture of right pubis, cachexia  Personal factors affecting patient at time of initial evaluation include: lives in two story house, stairs to enter home, inability to navigate community distances, inability to navigate level surfaces without external assistance, inability to perform dynamic tasks in community and limited home support  Prior to admission, patient was independent with functional mobility without assistive device, independent with ADLS, independent with IADLS, living with 81 yo mother in a two level home with 4 steps to enter, ambulating household distance and ambulating community distances    Please find objective findings from Physical Therapy assessment regarding body systems outlined above with impairments and limitations including weakness, impaired balance, decreased endurance, gait deviations, decreased activity tolerance, decreased functional mobility tolerance, decreased safety awareness and fall risk  The Barthel Index was used as a functional outcome tool presenting with a score of Barthel Index Score: 45 today indicating marked limitations of functional mobility and ADLS  Patient's clinical presentation is currently unstable/unpredictable as seen in patient's presentation of vital sign response, changing level of pain, increased fall risk, new onset of impairment of functional mobility, decreased endurance and new onset of weakness  Pt would benefit from continued Physical Therapy treatment to address deficits as defined above and maximize level of functional mobility  As demonstrated by objective findings, the assigned level of complexity for this evaluation is high  The patient's AM-PAC Basic Mobility Inpatient Short Form Raw Score is 18  A Raw score of greater than 16 suggests the patient may benefit from discharge to home  Please also refer to the recommendation of the Physical Therapist for safe discharge planning  PT Discharge Recommendation: Home with home health rehabilitation    See flowsheet documentation for full assessment

## 2023-05-11 NOTE — PROGRESS NOTES
[facility]  Progress Note  Name: Che Ballard  MRN: [id number]  Unit/Bed#: S -47 I Date of Admission: 5/4/2023   Date of Service: 5/11/2023 I Hospital Day: 7    Assessment/Plan   * Hemorrhagic cystitis w/ pyelonephritis  Assessment & Plan  - Patient sent to hospital by Nephro for MONROE and metabolic acidosis discovered on recent labs - followed by Nephrology for chronic cystitis, kidney disease w/ proteinuria (HTN, obstructive uropathy, underlying glomerular pathology, age-related nephron loss pathology)    - CT abd revealing no obstructive uropathy, mild left hydronephrosis and hydrorureter (pyelonephritis not excluded), persistent circumferential bladder wall thickening possibly indicating cystitis as well as severe constipation  Renal US- Redemonstration of bilateral hydronephrosis which appears slightly worsened as compared to ultrasound of 5/4/2023  There is echogenic debris within the bilateral collecting systems as well as the bladder which may represent cellular debris versus blood products, Stable positioning of right ureteral stent, Nonobstructing left renal calculus measuring 5 mm, Redemonstration of circumferential urinary bladder wall thickening compatible with chronic urinary retention versus cystitis   There is a tiny focus of air within suggested within the bladder as can be seen in the setting of cystitis versus recent   catheterization/instrumentation       Plan   Appreciate nephrology recommendations- Will need renal biopsy in near future, not emergent can do as outpatient, Cr improving   Appreciate Urology recommendations- Bilateral stent placement/exchange Day 2, monitor renal function, continue manley until urine is draining clear, leukocytosis and kidney function improves  Continue Cefepime Day 8   UA culture-Staph aureus  Urinary retention protocol  Blood Culture- Pending     Acute on chronic kidney disease  Assessment & Plan  -  Baseline Cr: 0 9-1 1  - Prior Alex Edilberto now 5   - Suspect 2/2 to UTI  Estimated Creatinine Clearance: 16 3 mL/min (A) (by C-G formula based on SCr of 2 65 mg/dL (H))  Cr- 2 65 improving     Plan  Avoid nephrotoxic drugs  Avoid hypotension  Monitor BMP    History of DVT (deep vein thrombosis)  Assessment & Plan  - Patient with DVT in 2017  - Currently on Lovenox 80 mg daily  VAS US-negative    Plan  Continue heparin DVT prophylaxis    Hyperphosphatemia  Assessment & Plan  - Phosphate of 8 4 on admission  - In setting of acute on chronic disease     Plan  -Continue PhosLo     Constipation  Assessment & Plan  - Sever constipation noted on CT abdomen  - Patient reports no BM in over a week  - Patient without any abdominal pain/tenderness or distention     Plan-  Resume regular diet  Bisacodyl suppository PRN    Severe protein-calorie malnutrition (HCC)  Assessment & Plan  Malnutrition Findings:   Adult Malnutrition type: Chronic illness  Adult Degree of Malnutrition: Other severe protein calorie malnutrition  Malnutrition Characteristics: Muscle loss, Fat loss                  360 Statement: inadequate intake related to poor appetite as evidenced by dark sunken orbital, wasted temporalis, buccal pads, hollow supraclavicular space, wasted interosseous  Int: Advance diet to regular, as medically indicated,  medical food supplements  BMI Findings: Body mass index is 23 43 kg/m²  Endometrial cancer [facility])  Assessment & Plan  - Diagnosis '14; s/p total abdominal hysterectomy and bilateral salpingo oophorectomy   - Status post ENDOBARR (rucaparib, atezolizumab, and bevacizumab) clinical trial started in 12/2020 and finished 12/2022  - She is currently under active surveillance           VTE Pharmacologic Prophylaxis: VTE Score: 6 High Risk (Score >/= 5) - Pharmacological DVT Prophylaxis Ordered: heparin  Sequential Compression Devices Ordered  Patient Centered Rounds: I performed bedside rounds with nursing staff today    Discussions with Specialists or Other Care Team Provider: Urology, Nephrology    Education and Discussions with Family / Patient: Updated  (sister) via phone  Current Length of Stay: 7 day(s)  Current Patient Status: Inpatient   Discharge Plan: Anticipate discharge in 24-48 hrs to discharge location to be determined pending rehab evaluations  Code Status: Level 1 - Full Code    Subjective:   Patient was seen and examined at the bedside  Patient was resting comfortably in no acute distress  At the time my evaluation patient was sitting up in the bed eating breakfast and looking well  Patient reports that she feels much better from yesterday and is no longer experiencing the right flank pain  Patient denies fever, chills, chest pain, shortness of breath, palpitations, abdominal pain, NVD, flank pain, urinary symptoms, leg pain, or any other symptoms at this time  Objective:     Vitals:   Temp (24hrs), Av 3 °F (36 8 °C), Min:98 °F (36 7 °C), Max:98 7 °F (37 1 °C)    Temp:  [98 °F (36 7 °C)-98 7 °F (37 1 °C)] 98 4 °F (36 9 °C)  HR:  [79-90] 84  Resp:  [16-20] 18  BP: (114-134)/(67-78) 117/78  SpO2:  [96 %-99 %] 98 %  Body mass index is 23 43 kg/m²  Input and Output Summary (last 24 hours): Intake/Output Summary (Last 24 hours) at 2023 1226  Last data filed at 2023 0511  Gross per 24 hour   Intake --   Output 2200 ml   Net -2200 ml       Physical Exam:   Physical Exam  Vitals and nursing note reviewed  Constitutional:       General: She is not in acute distress  Appearance: She is well-developed  She is ill-appearing  She is not toxic-appearing or diaphoretic  Comments: Improved from yesterday   HENT:      Head: Normocephalic and atraumatic  Mouth/Throat:      Mouth: Mucous membranes are moist    Eyes:      Extraocular Movements: Extraocular movements intact  Conjunctiva/sclera: Conjunctivae normal       Pupils: Pupils are equal, round, and reactive to light  Cardiovascular:      Rate and Rhythm: Normal rate and regular rhythm  Pulses: Normal pulses  Heart sounds: Normal heart sounds  No murmur heard  No friction rub  No gallop  Pulmonary:      Effort: Pulmonary effort is normal  No respiratory distress  Breath sounds: Normal breath sounds  No wheezing or rhonchi  Chest:      Chest wall: No tenderness  Abdominal:      General: Bowel sounds are normal       Palpations: Abdomen is soft  Tenderness: There is no abdominal tenderness  There is no right CVA tenderness, left CVA tenderness, guarding or rebound  Genitourinary:     Comments: Sifuentes in place draining yellow to cloudy urine no blood  Musculoskeletal:         General: No tenderness  Normal range of motion  Cervical back: Neck supple  Right lower leg: No edema  Left lower leg: No edema  Skin:     General: Skin is warm and dry  Capillary Refill: Capillary refill takes less than 2 seconds  Neurological:      General: No focal deficit present  Mental Status: She is alert and oriented to person, place, and time  Cranial Nerves: No cranial nerve deficit  Sensory: No sensory deficit  Motor: No weakness  Coordination: Coordination normal    Psychiatric:         Mood and Affect: Mood normal          Behavior: Behavior normal          Thought Content: Thought content normal           Additional Data:     Labs:  Results from last 7 days   Lab Units 05/11/23  0506 05/06/23  0535 05/05/23  0537   WBC Thousand/uL 17 63*   < > 13 02*   HEMOGLOBIN g/dL 8 2*   < > 8 0*   HEMATOCRIT % 27 4*   < > 26 1*   PLATELETS Thousands/uL 266   < > 328   NEUTROS PCT %  --   --  85*   LYMPHS PCT %  --   --  5*   MONOS PCT %  --   --  8   EOS PCT %  --   --  0    < > = values in this interval not displayed       Results from last 7 days   Lab Units 05/11/23  0506 05/07/23  0530 05/06/23  0535   SODIUM mmol/L 134*   < > 139   POTASSIUM mmol/L 4 4   < > 4 1   CHLORIDE mmol/L 101   < > 108   CO2 mmol/L 21   < > 21   BUN mg/dL 45*   < > 65*   CREATININE mg/dL 2 65*   < > 3 15*   ANION GAP mmol/L 12   < > 10   CALCIUM mg/dL 8 8   < > 8 5   ALBUMIN g/dL  --   --  2 9*   TOTAL BILIRUBIN mg/dL  --   --  0 29   ALK PHOS U/L  --   --  90   ALT U/L  --   --  5*   AST U/L  --   --  6*   GLUCOSE RANDOM mg/dL 71   < > 85    < > = values in this interval not displayed  Results from last 7 days   Lab Units 05/08/23  0515   INR  1 03                   Lines/Drains:  Invasive Devices     Central Venous Catheter Line  Duration           Port A Cath 11/12/19 Left Chest 1275 days          Peripheral Intravenous Line  Duration           Peripheral IV 05/08/23 Right Forearm 3 days          Drain  Duration           Ureteral Internal Stent Right ureter 6 Fr  42 days    Ureteral Internal Stent Left ureter 7 Fr  1 day    Ureteral Internal Stent Right ureter 7 Fr  1 day    Urethral Catheter Latex 20 Fr  1 day              Urinary Catheter:  Goal for removal: Voiding trial when leukocytosis and kidney function improves         Central Line:  Goal for removal: N/A - Chronic PICC             Imaging: Reviewed radiology reports from this admission including: abdominal/pelvic CT and ultrasound(s)    Recent Cultures (last 7 days):   Results from last 7 days   Lab Units 05/10/23  1415 05/09/23  1336 05/04/23  2157 05/04/23  2156 05/04/23 2127   BLOOD CULTURE  Received in Microbiology Lab  Culture in Progress  Received in Microbiology Lab  Culture in Progress  --  No Growth After 5 Days  No Growth After 5 Days    --    URINE CULTURE   --  60,000-69,000 cfu/ml Staphylococcus aureus*  --   --  >100,000 cfu/ml Staphylococcus aureus*  >100,000 cfu/ml Staphylococcus aureus*       Last 24 Hours Medication List:   Current Facility-Administered Medications   Medication Dose Route Frequency Provider Last Rate   • acetaminophen  650 mg Oral Q4H PRN Bianka Chapa MD     • ARIPiprazole  20 mg Oral Daily Kash Rodrigues MD Lupis     • bisacodyl  10 mg Rectal Once PRN Rajan Gonzalez MD     • calcium acetate  667 mg Oral TID With Meals Rajan Gonzalez MD     • cefepime  1,000 mg Intravenous Q24H Rajan Gonzalez MD 1,000 mg (05/10/23 5666)   • docusate sodium  100 mg Oral BID Rajan Gonzalez MD     • folic acid  1 mg Oral Daily Rajan Gonzalez MD     • HYDROmorphone  0 2 mg Intravenous Q4H PRN Dionna Zavala MD     • oxybutynin  15 mg Oral HS Rajan Gonzalez MD     • oxyCODONE  5 mg Oral Q4H PRN Rajan Gonzalez MD     • oxyCODONE  2 5 mg Oral Q4H PRN Rajan Gonzalez MD     • pantoprazole  40 mg Oral Early Morning Rajan Gonzalez MD     • polyethylene glycol  17 g Oral Daily Rajan Gonzalez MD     • saccharomyces boulardii  250 mg Oral BID Rajan Gonzalez MD     • venlafaxine  75 mg Oral TID Rajan Gonzalez MD          Today, Patient Was Seen By: Rajan Gonzalez MD    **Please Note: This note may have been constructed using a voice recognition system  **

## 2023-05-11 NOTE — ASSESSMENT & PLAN NOTE
- Patient sent to hospital by Nephro for MONROE and metabolic acidosis discovered on recent labs - followed by Nephrology for chronic cystitis, kidney disease w/ proteinuria (HTN, obstructive uropathy, underlying glomerular pathology, age-related nephron loss pathology)    - CT abd revealing no obstructive uropathy, mild left hydronephrosis and hydrorureter (pyelonephritis not excluded), persistent circumferential bladder wall thickening possibly indicating cystitis as well as severe constipation  Renal US- Redemonstration of bilateral hydronephrosis which appears slightly worsened as compared to ultrasound of 5/4/2023  There is echogenic debris within the bilateral collecting systems as well as the bladder which may represent cellular debris versus blood products, Stable positioning of right ureteral stent, Nonobstructing left renal calculus measuring 5 mm, Redemonstration of circumferential urinary bladder wall thickening compatible with chronic urinary retention versus cystitis   There is a tiny focus of air within suggested within the bladder as can be seen in the setting of cystitis versus recent   catheterization/instrumentation       Plan   Appreciate nephrology recommendations- Will need renal biopsy in near future, not emergent can do as outpatient, Cr improving   Appreciate Urology recommendations- Bilateral stent placement/exchange Day 2, monitor renal function, continue manley until urine is draining clear, leukocytosis and kidney function improves  Continue Cefepime Day 8   UA culture-Staph aureus  Urinary retention protocol  Blood Culture- Pending

## 2023-05-11 NOTE — ASSESSMENT & PLAN NOTE
- Patient with DVT in 2017  - Currently on Lovenox 80 mg daily  VAS US-negative    Plan  Continue heparin DVT prophylaxis 31 y/o F with hx of depression, migraines presents with c/o dizziness. Pt states that she was standing at work when she started feeling lightheaded, nauseous, saw "stars" and vomited. States that she felt weak and shaky and felt like she was going to pass out. States that she also felt pressure in her chest which has improved but has mild constant, nonradiating pressure like sensation in her chest. States that she still feels slightly nauseous and lightheaded. Denies hx of syncope, fever, cough, vomiting, abdominal pain, recent travel/immobilization, calf pain/swelling. Pt vapes occasionally. Had covid in 12/21.

## 2023-05-11 NOTE — ASSESSMENT & PLAN NOTE
-  Baseline Cr: 0 9-1 1  - Prior CDK3a now 5   - Suspect 2/2 to UTI  Estimated Creatinine Clearance: 16 3 mL/min (A) (by C-G formula based on SCr of 2 65 mg/dL (H))    Cr- 2 65 improving     Plan  Avoid nephrotoxic drugs  Avoid hypotension  Monitor BMP

## 2023-05-11 NOTE — PROGRESS NOTES
"Progress Note - Urology  Ragini Lovettperla 1955, 74 y o  female MRN: [id number]    Unit/Bed#: S -01 Encounter: [id number]      Assessment & Plan:  1  Bilateral hydronephrosis  -Status post bilateral retrograde pyelogram, left ureteral stent placement, right ureteral stent exchange  -Creatinine 2 65, yesterday 2 76  Continue to trend  Baseline 0 9-1 1  Nephrology following, likely patient has glomerular process as well  Likely kidney biopsy as an outpatient  -WBC to 17 63, yesterday 22 44  -Sifuentes catheter in place draining yellow-colored urine, monitor amount of purulence in Sifuentes tubing  -Maintain Sifuentes catheter until resolution of leukocytosis, improvement of creatinine  -Urine culture showing Staph aureus  -Vital signs stable, afebrile  -Continue medical optimization antibiosis per primary team  -Urology will continue to follow    Subjective:   HPI: Patient seen at bedside this morning  No overnight events  Review of Systems   Constitutional: Negative for chills and fever  Respiratory: Negative for cough and shortness of breath  Cardiovascular: Negative for chest pain and palpitations  Gastrointestinal: Negative for abdominal pain and vomiting  Genitourinary: Negative for dysuria and hematuria  Musculoskeletal: Negative for arthralgias and back pain  Skin: Negative for color change and rash  Neurological: Negative for seizures and syncope  All other systems reviewed and are negative  Objective:  Vitals: Blood pressure 114/67, pulse 79, temperature 98 °F (36 7 °C), resp  rate 16, height 4' 11\" (1 499 m), SpO2 99 %, not currently breastfeeding  ,Body mass index is 23 43 kg/m²  Physical Exam  Vitals reviewed  Constitutional:       General: She is not in acute distress  Appearance: Normal appearance  She is normal weight  She is not ill-appearing, toxic-appearing or diaphoretic  HENT:      Head: Normocephalic and atraumatic        Right Ear: External ear normal       " Left Ear: External ear normal       Nose: Nose normal       Mouth/Throat:      Mouth: Mucous membranes are moist    Eyes:      General: No scleral icterus  Conjunctiva/sclera: Conjunctivae normal    Cardiovascular:      Rate and Rhythm: Normal rate  Pulses: Normal pulses  Pulmonary:      Effort: Pulmonary effort is normal    Abdominal:      General: Abdomen is flat  There is no distension  Palpations: Abdomen is soft  Tenderness: There is no abdominal tenderness  There is no guarding  Genitourinary:     Comments: Sifuentes catheter in place draining yellow-colored urine, moderate amount of purulence in Sifuentes tubing  Musculoskeletal:         General: Normal range of motion  Cervical back: Normal range of motion  Skin:     General: Skin is warm  Findings: No rash  Neurological:      General: No focal deficit present  Mental Status: She is alert and oriented to person, place, and time  Mental status is at baseline  Psychiatric:         Mood and Affect: Mood normal          Behavior: Behavior normal          Thought Content:  Thought content normal          Judgment: Judgment normal        Labs:  Recent Labs     05/09/23  0525 05/10/23  0500 05/11/23  0506   WBC 13 19* 22 44* 17 63*     Recent Labs     05/09/23  0525 05/10/23  0500 05/11/23  0506   HGB 7 8* 8 4* 8 2*     Recent Labs     05/09/23  0525 05/10/23  0500 05/11/23  0506   HCT 26 0* 29 4* 27 4*     Recent Labs     05/09/23  0525 05/10/23  0645 05/11/23  0506   CREATININE 3 01* 2 76* 2 65*         History:  Past Medical History:   Diagnosis Date   • Anxiety    • CKD (chronic kidney disease) stage 3, GFR 30-59 ml/min ([facility] [address] )    • COVID     Fall 2022   • Depression    • Endometrial cancer ([facility] [address] ) 12/2017   • GERD (gastroesophageal reflux disease)    • H/O gastric bypass    • Hard to intubate     pt denies   • History of chemotherapy     started 12/2021endometrial cancer- done 12/23/22   • History of DVT in adulthood     RLE   • Hyperglycemia     vx type 2 dm -- last assessed 4/1/14; resolved 11/7/17   • Hyperlipidemia    • Hypertension     in past- no meds at present   • Iron deficiency anemia     iron infusions weekly   • OAB (overactive bladder)    • Port-A-Cath in place    • Wears glasses      Social History     Socioeconomic History   • Marital status: Single     Spouse name: None   • Number of children: None   • Years of education: None   • Highest education level: None   Occupational History   • None   Tobacco Use   • Smoking status: Never   • Smokeless tobacco: Never   Vaping Use   • Vaping Use: Never used   Substance and Sexual Activity   • Alcohol use: Not Currently   • Drug use: No   • Sexual activity: Not Currently   Other Topics Concern   • None   Social History Narrative   • None     Social Determinants of Health     Financial Resource Strain: Not on file   Food Insecurity: No Food Insecurity   • Worried About Running Out of Food in the Last Year: Never true   • Ran Out of Food in the Last Year: Never true   Transportation Needs: No Transportation Needs   • Lack of Transportation (Medical): No   • Lack of Transportation (Non-Medical):  No   Physical Activity: Not on file   Stress: Not on file   Social Connections: Not on file   Intimate Partner Violence: Not on file   Housing Stability: Unknown   • Unable to Pay for Housing in the Last Year: No   • Number of Places Lived in the Last Year: Not on file   • Unstable Housing in the Last Year: No     Past Surgical History:   Procedure Laterality Date   • ABDOMINAL SURGERY      GASTRIC BYPASS; 2001   • BREAST BIOPSY Right 06/28/2019    core biopsy; benign   • CHOLECYSTECTOMY      at the time of gastric bypass   • COLONOSCOPY     • CT NEEDLE BIOPSY LYMPH NODE  07/08/2019   • FL GUIDED CENTRAL VENOUS ACCESS DEVICE INSERTION  11/12/2019   • FL RETROGRADE PYELOGRAM  3/30/2023   • FL RETROGRADE PYELOGRAM  5/9/2023   • GASTRIC BYPASS     • HYSTERECTOMY Bilateral 2017    total abdominal with salpingo-oophorectomy   • IR NEPHROSTOMY TO NEPHROURETERAL STENT  06/09/2022   • IR NEPHROSTOMY TO NEPHROURETERAL STENT  12/20/2022   • IR NEPHROSTOMY TUBE CHECK/CHANGE/REPOSITION/REINSERTION/UPSIZE  05/27/2022   • IR NEPHROSTOMY TUBE PLACEMENT  07/26/2019   • IR NEPHROURETERAL STENT CHECK/CHANGE/REPOSITION  04/06/2021   • IR NEPHROURETERAL STENT CHECK/CHANGE/REPOSITION  06/04/2021   • IR NEPHROURETERAL STENT CHECK/CHANGE/REPOSITION  09/03/2021   • IR NEPHROURETERAL STENT CHECK/CHANGE/REPOSITION  12/07/2021   • IR NEPHROURETERAL STENT CHECK/CHANGE/REPOSITION  03/08/2022   • IR NEPHROURETERAL STENT CHECK/CHANGE/REPOSITION  05/10/2022   • IR NEPHROURETERAL STENT CHECK/CHANGE/REPOSITION  09/19/2022   • IR PICC LINE  09/27/2019   • IR PORT PLACEMENT  07/26/2019   • IR PORT REMOVAL  09/20/2019   • OOPHORECTOMY Bilateral 2017   • WI CYSTO BLADDER W/URETERAL CATHETERIZATION Right 3/30/2023    Procedure: CYSTOSCOPY RETROGRADE PYELOGRAM WITH exchange of STENT URETERAL;  Surgeon: Sally Yang MD;  [facility];  Service: Urology   • WI CYSTO BLADDER W/URETERAL CATHETERIZATION Bilateral 5/9/2023    Procedure: CYSTOSCOPY, BILATERAL RETROGRADE PYELOGRAM, WITH LEFT INSERTION STENT URETERAL, RIGHT URTERAL STENT EXCHANGE;  Surgeon: Laila Smith MD;  [facility];  Service: Urology   • WI CYSTO W/INSERT URETERAL STENT Right 3/30/2023    Procedure: EXCHANGE STENT URETERAL;  Surgeon: Sally Yang MD;  [facility];  Service: Urology   • WI INSJ TUNNELED CTR VAD W/SUBQ PORT AGE 5 YR/> Left 11/12/2019    Procedure: INSERTION VENOUS PORT ( PORT-A-CATH) IR;  Surgeon: Eveline Guajardo DO;  [facility];  Service: Interventional Radiology   • WI LAPS ABD PRTM&OMENTUM DX W/WO SPEC BR/WA SPX N/A 12/19/2017    Procedure: LAPAROSCOPY DIAGNOSTIC;  Surgeon: Fatimah Sarabia MD;  [facility];  Service: Gynecology Oncology   • WI LAPS Wilberto Neigh CAROLYN CHACON/CARI [employer]aks TUBE/OVARY N/A 12/19/2017    Procedure: HYSTERECTOMY LAPAROSCOPIC TOTAL ([address]) W/ ROBOTICS; BILATERAL SALPINGOOPHERECTOMY; LYMPH NODE DISSECTION; lysis of adhesions;  Surgeon: Nohemi Kennedy MD;  Location[facility];  Service: Gynecology Oncology   • TONSILLECTOMY     • US GUIDED BREAST BIOPSY RIGHT COMPLETE Right 06/28/2019     Family History   Problem Relation Age of Onset   • Hyperlipidemia Mother    • Heart disease Mother    • Ovarian cancer Mother 48   • Colon cancer Mother    • Lymphoma Father    • Breast cancer Sister 61   • No Known Problems Brother    • No Known Problems Brother    • No Known Problems Maternal Grandmother    • Bone cancer Maternal Grandfather    • Uterine cancer Paternal Grandmother    • No Known Problems Paternal Grandfather    • No Known Problems Maternal Aunt    • No Known Problems Maternal Aunt    • No Known Problems Maternal Aunt    • No Known Problems Maternal Aunt    • No Known Problems Paternal Aunt    • No Known Problems Paternal Aunt    • No Known Problems Paternal Aunt    • No Known Problems Paternal Aunt        Stacie Mcbride [place]  Date: 5/11/2023 Time: 8:59 AM

## 2023-05-11 NOTE — PLAN OF CARE
Problem: OCCUPATIONAL THERAPY ADULT  Goal: Performs self-care activities at highest level of function for planned discharge setting  See evaluation for individualized goals  Description: Treatment Interventions: ADL retraining, Functional transfer training, Endurance training, Patient/family training, Equipment evaluation/education, Compensatory technique education, Energy conservation, Activityengagement  Equipment Recommended: Shower/Tub chair with back ($)       See flowsheet documentation for full assessment, interventions and recommendations  Note: Limitation: Decreased ADL status, Decreased endurance, Decreased self-care trans, Decreased high-level ADLs     Assessment: Pt is a 74yo female admitted to [facility] on 5/4/23 at request of her nephrologist for acutely worsening kidney function and metabolic acidosis  Diagnosed w/ hemorrhagic cystitis w/ pyelonephritis  Significant PMH impacting her occupational performance includes endometrial cancer s/p chemo, DVT, HTN, CKD Stage 3, chronic UTI, anxiety/ depression, hx gastric bypass, hysterectomy (2017)  Pt is s/p Cystoscopy, bilateral retrograde pyelography with fluoroscopic interpretation, Left ureteral stent placement, right ureteral stent exchange on 5/9/23  Pt reports I w/ ADL/ IADL at baseline at home in [facility] w/ her 79yo mother  Upon eval pt alert and oriented  Able to follow directions, communicate wants / needs w/ flat affect  Encouragement to challenge activity tolerance  Pt required mod I UBD/ grooming seated, S LBD, and mod A toileting  Pt required S to stand from recliner chair and use of RW w/ S for short distance functional mobility w/ in room  Pt completing ADLs below baseline level of I w/ decreased activity tolerance, decreased endurance, generalized weakness  Recommend DC home w/ family support when medically stable for discharge from acute care   Will continue to follow     OT Discharge Recommendation: No rehabilitation needs (anticipate DC home w/ family support and PT services pend medical optimization, improved activity tolerance)

## 2023-05-11 NOTE — OCCUPATIONAL THERAPY NOTE
Occupational Therapy Evaluation     Patient Name: Cece WILDEDKDESIREE Date: 5/11/2023  Problem List  Principal Problem:    Hemorrhagic cystitis w/ pyelonephritis  Active Problems:    Endometrial cancer ([place] [address] )    Acute on chronic kidney disease    Severe protein-calorie malnutrition (HCC)    Constipation    Hyperphosphatemia    History of DVT (deep vein thrombosis)    Other proteinuria    Acute kidney injury ([place] [address] )    Chronic kidney disease    Past Medical History  Past Medical History:   Diagnosis Date    Anxiety     CKD (chronic kidney disease) stage 3, GFR 30-59 ml/min ([place] [address] )     COVID     Fall 2022    Depression     Endometrial cancer ([place] [address] ) 12/2017    GERD (gastroesophageal reflux disease)     H/O gastric bypass     Hard to intubate     pt denies    History of chemotherapy     started 12/2021endometrial cancer- done 12/23/22    History of DVT in adulthood     RLE    Hyperglycemia     vx type 2 dm -- last assessed 4/1/14; resolved 11/7/17    Hyperlipidemia     Hypertension     in past- no meds at present    Iron deficiency anemia     iron infusions weekly    OAB (overactive bladder)     Port-A-Cath in place     Wears glasses      Past Surgical History  Past Surgical History:   Procedure Laterality Date    ABDOMINAL SURGERY      GASTRIC BYPASS; 2001    BREAST BIOPSY Right 06/28/2019    core biopsy; benign    CHOLECYSTECTOMY      at the time of gastric bypass    COLONOSCOPY      CT NEEDLE BIOPSY LYMPH NODE  07/08/2019    FL GUIDED CENTRAL VENOUS ACCESS DEVICE INSERTION  11/12/2019    FL RETROGRADE PYELOGRAM  3/30/2023    FL RETROGRADE PYELOGRAM  5/9/2023    GASTRIC BYPASS      HYSTERECTOMY Bilateral 2017    total abdominal with salpingo-oophorectomy    IR NEPHROSTOMY TO NEPHROURETERAL STENT  06/09/2022    IR NEPHROSTOMY TO NEPHROURETERAL STENT  12/20/2022    IR NEPHROSTOMY TUBE CHECK/CHANGE/REPOSITION/REINSERTION/UPSIZE  05/27/2022    IR NEPHROSTOMY TUBE PLACEMENT  07/26/2019    IR NEPHROURETERAL STENT CHECK/CHANGE/REPOSITION  04/06/2021    IR NEPHROURETERAL STENT CHECK/CHANGE/REPOSITION  06/04/2021    IR NEPHROURETERAL STENT CHECK/CHANGE/REPOSITION  09/03/2021    IR NEPHROURETERAL STENT CHECK/CHANGE/REPOSITION  12/07/2021    IR NEPHROURETERAL STENT CHECK/CHANGE/REPOSITION  03/08/2022    IR NEPHROURETERAL STENT CHECK/CHANGE/REPOSITION  05/10/2022    IR NEPHROURETERAL STENT CHECK/CHANGE/REPOSITION  09/19/2022    IR PICC LINE  09/27/2019    IR PORT PLACEMENT  07/26/2019    IR PORT REMOVAL  09/20/2019    OOPHORECTOMY Bilateral 2017    VA CYSTO BLADDER W/URETERAL CATHETERIZATION Right 3/30/2023    Procedure: CYSTOSCOPY RETROGRADE PYELOGRAM WITH exchange of STENT URETERAL;  Surgeon: Amandeep Pelayo MD;  [facility];  Service: Urology    VA CYSTO BLADDER W/URETERAL CATHETERIZATION Bilateral 5/9/2023    Procedure: CYSTOSCOPY, BILATERAL RETROGRADE PYELOGRAM, WITH LEFT INSERTION STENT URETERAL, RIGHT URTERAL STENT EXCHANGE;  Surgeon: Nuvia Thrasher MD;  [facility];  Service: Urology    VA CYSTO W/INSERT URETERAL STENT Right 3/30/2023    Procedure: EXCHANGE STENT URETERAL;  Surgeon: Amandeep Pelayo MD;  [facility];  Service: Urology    VA INSJ TUNNELED CTR VAD W/SUBQ PORT AGE 5 YR/> Left 11/12/2019    Procedure: INSERTION VENOUS PORT ( PORT-A-CATH) IR;  Surgeon: Mitali Vuong DO;  [facility];  Service: Interventional Radiology    VA LAPS ABD PRTM&OMENTUM DX W/WO SPEC BR/WA SPX N/A 12/19/2017    Procedure: LAPAROSCOPY DIAGNOSTIC;  Surgeon: Servando Salazar MD;  [facility];  Service: Gynecology Oncology    VA LAPS Eren Hoist HYST W/BILAT LMPHADEC RMVL TUBE/OVARY N/A 12/19/2017    Procedure: HYSTERECTOMY LAPAROSCOPIC TOTAL ([address]) W/ ROBOTICS; BILATERAL SALPINGOOPHERECTOMY; LYMPH NODE DISSECTION; lysis of adhesions;  Surgeon: Servando Salazar MD;  [facility];  Service: Gynecology Oncology    TONSILLECTOMY      US GUIDED BREAST BIOPSY RIGHT COMPLETE "Right 06/28/2019 05/11/23 1042   OT Last Visit   OT Visit Date 05/11/23  (Thursday)   Note Type   Note type Evaluation   Pain Assessment   Pain Assessment Tool 0-10   Pain Score No Pain   Restrictions/Precautions   Weight Bearing Precautions Per Order No   Other Precautions Contact/isolation;Multiple lines; Fall Risk  (manley catheter, [place])   Home Living   Type of [address]  (4 RAJANI; bottom step is higher)   Home Layout Two level;Bed/bath upstairs;Stairs to enter with rails   [address] glide;Walker;Cane;Other (Comment)  (not using AD PTA)   Additional Comments Pt reports living w/ her 79yo mother in 2  and use of stairglide to access 2nd for bedroom / bathroom   Prior Function   Level of Rabun Independent with ADLs; Independent with functional mobility; Independent with IADLS   Lives With Other (Comment)  (79yo mother)   Bonilla Mourning Help From   (supportive sister visits often)   IADLs Independent with driving; Independent with meal prep; Independent with medication management   Falls in the last 6 months 0   Vocational Retired   Comments Pt reports I w/ ADL/ IADL at baseline w/ out use of AD  Utilizes stairglilde to access 2nd floor   Lifestyle   Autonomy Pt reports I w/ ADLs / IADLs at baseline w/ out use of AD   Reciprocal Relationships Pt reports living w/ her 79yo mother in [facility]   Supportive local sister   Service to Others 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[place] [address] Way Pt reports enjoying reading mysteries   General   Additional Pertinent History s/p Cystoscopy, bilateral retrograde pyelography with fluoroscopic interpretation, Left ureteral stent placement, right ureteral stent exchange on 5/9/23   Family/Caregiver Present No  (pt reports that her sister is comng by later and is going to bring her a book)   Subjective   Subjective \"I think that I will be okay in the next few days once I am ready to go home\"   ADL " "  Where Assessed Chair   Eating Assistance 6  Modified independent   Eating Deficit Setup   Grooming Assistance 6  Modified Independent  (seated due to decreased stand tolerance)   Grooming Deficit Setup; Increased time to complete   UB Bathing Assistance Unable to assess  (anticipate mod I for + time, set- up based on fxal obs skills, clinical judgement)   LB Bathing Assistance   (anticipate S after set- up seated based on fxal obs skills, clinical judgement)   LB Bathing Deficit Setup   UB Dressing Assistance 6  Modified independent   UB Dressing Deficit Setup; Increased time to complete   LB Dressing Assistance 5  Supervision/Setup   LB Dressing Deficit Setup;Supervision/safety; Increased time to complete; Requires assistive device for steadying   Toileting Assistance  3  Moderate Assistance   Toileting Deficit Perineal hygiene;Setup;Supervison/safety; Increased time to complete   Bed Mobility   Supine to Sit Unable to assess   Sit to Supine Unable to assess   Additional Comments Pt seated OOB in chair upon arrival and post eval w/ needs met, call bell in reach   Transfers   Sit to Stand 5  Supervision   Additional items Assist x 1; Armrests; Increased time required;Verbal cues  (instruction for hand placement)   Stand to Sit 5  Supervision   Additional items Assist x 1; Armrests; Increased time required;Verbal cues  (hand placement)   Additional Comments Pt reported feeling \"lightheaded\" and \"nauseous\" during functional mobility using RW  Symptoms improved upon return to chair   Pt appeared anxious and stated \"I am not sure about this with the bag\" (referring to manley catheter)   Functional Mobility   Functional Mobility 5  Supervision   Additional Comments engaged in functional mobility w/ in room using RW w/ S   Additional items Rolling walker   Balance   Static Sitting Good   Dynamic Sitting Fair   Static Standing Fair   Ambulatory Fair  (using RW)   Activity Tolerance   Activity Tolerance Patient limited by fatigue " Medical Staff Made Aware spoke w/ PT, Ale and Rinku NO   Nurse Made Aware spoke w/ RNRicardo   RUE Assessment   RUE Assessment   (observed during ADLs)   RUE Strength   RUE Overall Strength Within Functional Limits - able to perform ADL tasks with strength   LUE Assessment   LUE Assessment   (observed during ADLs)   LUE Strength   LUE Overall Strength Within Functional Limits - able to perform ADL tasks with strength   Hand Function   Gross Motor Coordination Functional   Fine Motor Coordination Functional   Vision-Basic Assessment   Current Vision Wears glasses all the time   Cognition   Overall Cognitive Status [facility]   Arousal/Participation Alert; Cooperative   Attention Within functional limits   Orientation Level Oriented X4   Memory Within functional limits   Following Commands Follows multistep commands without difficulty   Comments Identified pt by full name and birthdate  Alert, generally oriented and able to follow directions during ADLs  Pt appeared anxious about moving around w/ manley catheter   Assessment   Limitation Decreased ADL status; Decreased endurance;Decreased self-care trans;Decreased high-level ADLs   Assessment Pt is a 72yo female admitted to [facility] on 5/4/23 at request of her nephrologist for acutely worsening kidney function and metabolic acidosis  Diagnosed w/ hemorrhagic cystitis w/ pyelonephritis  Significant PMH impacting her occupational performance includes endometrial cancer s/p chemo, DVT, HTN, CKD Stage 3, chronic UTI, anxiety/ depression, hx gastric bypass, hysterectomy (2017)  Pt is s/p Cystoscopy, bilateral retrograde pyelography with fluoroscopic interpretation, Left ureteral stent placement, right ureteral stent exchange on 5/9/23  Pt reports I w/ ADL/ IADL at baseline at home in [facility] w/ her 81yo mother  Upon eval pt alert and oriented  Able to follow directions, communicate wants / needs w/ flat affect  Encouragement to challenge activity tolerance   Pt required mod I UBD/ grooming seated, S LBD, and mod A toileting  Pt required S to stand from recliner chair and use of RW w/ S for short distance functional mobility w/ in room  Pt completing ADLs below baseline level of I w/ decreased activity tolerance, decreased endurance, generalized weakness  Recommend DC home w/ family support when medically stable for discharge from acute care  Will continue to follow   Goals   Patient Goals Pt stated that she would like to return home to baseline level of I  Plan   Treatment Interventions ADL retraining;Functional transfer training; Endurance training;Patient/family training;Equipment evaluation/education; Compensatory technique education; Energy conservation; Activityengagement   Goal Expiration Date 05/21/23   OT Frequency 1-2x/wk   Recommendation   OT Discharge Recommendation No rehabilitation needs  (anticipate DC home w/ family support and PT services pend medical optimization, improved activity tolerance)   Equipment Recommended Shower/Tub chair with back ($)   AM-PAC Daily Activity Inpatient   Lower Body Dressing 3   Bathing 3   Toileting 3   Upper Body Dressing 4   Grooming 4   Eating 4   Daily Activity Raw Score 21   Daily Activity Standardized Score (Calc for Raw Score >=11) 44 27   AM-[facility] Applied Cognition Inpatient   Following a Speech/Presentation 4   Understanding Ordinary Conversation 4   Taking Medications 3   Remembering Where Things Are Placed or Put Away 4   Remembering List of 4-5 Errands 4   Taking Care of Complicated Tasks 4   Applied Cognition Raw Score 23   Applied Cognition Standardized Score 53 08   Barthel Index   Feeding 10   Bathing 0   Grooming Score 5   Dressing Score 5   Bladder Score 0   Bowels Score 5   Toilet Use Score 5   Transfers (Bed/Chair) Score 10   Mobility (Level Surface) Score 0   Stairs Score 0   Barthel Index Score 40   Modified Jewell Scale   Modified Jewell Scale 3         The patient's raw score on the AM-PAC Daily Activity Inpatient Short Form is 21  A raw score of greater than or equal to 19 suggests the patient may benefit from discharge to home  Please refer to the recommendation of the Occupational Therapist for safe discharge planning  Pt goals to be met by 5/21/23 to max I w/ ADLs and improve engagement to return home to baseline level of I at home w/ her mother includes:    -Pt will demonstrate good attention and understanding EC tech to max I w/ ADLs and improve engagement    -Pt will consistently follow multi step directions during ADLs to improve engagement     -Pt will complete bed mobility supine <> sit w/ mod I    -Pt will complete UBD/LBD w/ mod I for + time to max I w/ ADLs    -Pt will consistently engage in functional mobility using LRAD w/ mod I household distances to max I and return home    -Pt will complete functional transfers to all surfaces w/ mod I using LRAD, DME as needed    -Pt will demonstrate improved activity, functional standing tolerance for at least 10 minutes w/ at least fair balance using LRAD while engaged in grooming/ bathing at sink to max I w/ light IADLs and functional mobility to return home       RONI Garland/ETHAN  [id number]  [id number]

## 2023-05-11 NOTE — PROGRESS NOTES
Progress Note - Nephrology   Ragini Melendez 74 y o  female MRN: [id number]  Unit/Bed#: S -01 Encounter: [id number]      Assessment:  The patient presents with MONROE from her outpatient nephrologist, with outpatient labs showing Cr elevation to 4 26, and anion gap of 17 with bicarb of 12 on the day of presentation  Her MONROE may be secondary to her poor oral intake over the previous week, in addition to hydronephrosis and urinary retention  Creatinine continues to improve after ureteral stent placement on left and replacement on right, and obstruction is hopefully relieved on the left after this procedure  If renal function continues to improve that would reduce the urgency of renal biopsy, and she would be able to follow with her outpatient nephrologist for further biopsy discussions  Plan:  1  MONROE  -Patient presented with creatinine of 4 26 on outpatient labs 5/4, POA creatinine 4 10  -Creatinine now further improving to 2 65, trending down   -Baseline creatinine 0 9 - 1 1  -Avoid nephrotoxic agents, IV contrast, NSAIDs   -Ureteral stenting from 5/9 demonstrated purulent drainage from the left ureteral stent when placed  -F/u urine culture from 5/9 -  Positive for staph aureus, preliminary   -History of right-sided nephrostomy tube  -Left hydronephrosis appears secondary to obstruction, as relived by stent placement  -Monitor BMP daily      2  Metabolic acidosis  -POA VBG: pH 7 207, PCO2 18 5, PO2 86 7, HCO3 7 2   -Acidosis improved to 7 309 after initiation of bicarb IVF  -Bicarb and anion gap stable      3  Hemorrhagic cystitis  -Urine output is yellow and clear with white debris on 5/9 and 5/10    -Urology on board  -On antibiotics per primary team   -Following urine culture from 5/9 due to purulent drainage after stenting   Defer to primary team and urology for final treatment decision      4  CKD stage III  -Avoid nephrotoxic agents, IV contrast, NSAIDs   -Baseline creatinine 0 9-1 1, most recent outpatient labs in March showed elevated creatinine of 1 60   -Creatinine is now further improving today, suggesting further recovery of renal function      5   Nephrotic range proteinuria  -The patient has been following with Minerva Frye with nephrology, who has been working up proteinuria  -Urine albumin to creatinine ratio has been steadily rising since June 2022   -Suspected to be secondary to bevacizumab, which was held on 9/9/2022, though additional work-up has been pursued  -CHERI 1: 80, dsDNA negative, no complementemia, hepatitis B/hepatitis C/HIV negative, SPEP negative x2, serum free light chain ratio 1 5, RF negative  -Pending outpatient labs to follow from 5/4/2023: protein electrophoresis serum   -Immunoglobulin free LT chains: IgG kappa to 48 5, Ig lambda 137 3, kappa/lambda ratio 1 81 (elevated), cryoglobulin negative, Phospholipase A2 receptor antibodies <1 8, methylmalonic acid level 188   -Plan to repeat free light chain level in steady state   -Avoiding RAAS inhibition due to low systolic blood pressure as outpatient   -Outpatient plan was to assess kidney function with nuclear medicine scan to determine which kidney to biopsy   -Considering her significant proteinuria and decreased kidney function, renal biopsy will need to be considered at this point to determine if the cause it treatable as soon as possible, though may need to postpone this until treatment for UTI is complete      6   Hyperphosphatemia  -Continuing PhosLo phosphate binder 667 3 times daily with meals  -Monitor phosphorus, 5 1 on 5/9/23      7  Endometrial cancer (in remission)  -Status post rucaparib, atezolizumab, and bevacizumab, from December 2020 - December 2022   Bevacizumab stopped September 2022   -Currently monitoring for recurrence   -Had previously involved the right ureter causing stenosis, leading to her current ureteral stent      Subjective:   Patient reports she feels better today than yesterday, feeling less "\"foggy\", which she attributed to having Dilaudid the previous night  Her right-sided flank/CVA tenderness and pain have not returned since yesterday  She denies any abdominal pain or tenderness  She denies shortness of breath or chest pain  She expresses that she would prefer not to have a renal biopsy at this time if it can wait for outpatient follow-up with her nephrologist     Objective:     Vitals: Blood pressure 114/67, pulse 79, temperature 98 °F (36 7 °C), resp  rate 16, height 4' 11\" (1 499 m), SpO2 99 %, not currently breastfeeding  ,Body mass index is 23 43 kg/m²  Weight (last 2 days)     None            Intake/Output Summary (Last 24 hours) at 5/11/2023 1113  Last data filed at 5/11/2023 0511  Gross per 24 hour   Intake --   Output 2200 ml   Net -2200 ml       Urethral Catheter Latex 20 Fr  (Active)   Reasons to continue Urinary Catheter  Post-operative urological requirements 05/09/23 2037   Site Assessment Clean 05/11/23 0706   Sifuentes Care Done 05/11/23 0900   Collection Container Standard drainage bag 05/11/23 0706   Securement Method Securing device (Describe) 05/11/23 0706   Output (mL) 1100 mL 05/11/23 0511       Physical Exam:   Physical Exam  Vitals and nursing note reviewed  Constitutional:       General: She is not in acute distress  Appearance: She is well-developed  She is not toxic-appearing  HENT:      Head: Normocephalic and atraumatic  Right Ear: External ear normal       Left Ear: External ear normal       Nose: Nose normal  No rhinorrhea  Mouth/Throat:      Mouth: Mucous membranes are moist    Eyes:      General: No scleral icterus  Conjunctiva/sclera: Conjunctivae normal    Cardiovascular:      Rate and Rhythm: Normal rate and regular rhythm  Pulses: Normal pulses  Heart sounds: No murmur heard  Pulmonary:      Effort: Pulmonary effort is normal  No respiratory distress  Breath sounds: Normal breath sounds  No wheezing or rales     Abdominal:    " General: Bowel sounds are normal  There is no distension  Palpations: Abdomen is soft  Tenderness: There is no abdominal tenderness  There is no right CVA tenderness or left CVA tenderness  Musculoskeletal:         General: No swelling  Cervical back: Neck supple  Right lower leg: No edema  Left lower leg: No edema  Skin:     General: Skin is warm and dry  Capillary Refill: Capillary refill takes less than 2 seconds  Neurological:      Mental Status: She is alert and oriented to person, place, and time  Psychiatric:         Mood and Affect: Mood normal          Behavior: Behavior normal          Thought Content: Thought content normal            Lab, Imaging and other studies:   Preliminary urine culture results from sample taken 5/9/2023 were positive for Staph aureus  I have personally reviewed pertinent labs    CBC:   Lab Results   Component Value Date    WBC 17 63 (H) 05/11/2023    HGB 8 2 (L) 05/11/2023    HCT 27 4 (L) 05/11/2023    MCV 96 05/11/2023     05/11/2023    MCH 28 8 05/11/2023    MCHC 29 9 (L) 05/11/2023    RDW 13 8 05/11/2023    MPV 9 9 05/11/2023     CMP:   Lab Results   Component Value Date    SODIUM 134 (L) 05/11/2023    K 4 4 05/11/2023     05/11/2023    CO2 21 05/11/2023    BUN 45 (H) 05/11/2023    CREATININE 2 65 (H) 05/11/2023    CALCIUM 8 8 05/11/2023    EGFR 18 05/11/2023

## 2023-05-12 ENCOUNTER — TELEPHONE (OUTPATIENT)
Dept: GENETICS | Facility: CLINIC | Age: 68
End: 2023-05-12

## 2023-05-12 LAB
ANION GAP SERPL CALCULATED.3IONS-SCNC: 10 MMOL/L (ref 4–13)
BASOPHILS # BLD MANUAL: 0.12 THOUSAND/UL (ref 0–0.1)
BASOPHILS NFR MAR MANUAL: 1 % (ref 0–1)
BUN SERPL-MCNC: 42 MG/DL (ref 5–25)
CALCIUM SERPL-MCNC: 8.9 MG/DL (ref 8.4–10.2)
CHLORIDE SERPL-SCNC: 99 MMOL/L (ref 96–108)
CO2 SERPL-SCNC: 24 MMOL/L (ref 21–32)
CREAT SERPL-MCNC: 2.32 MG/DL (ref 0.6–1.3)
EOSINOPHIL # BLD MANUAL: 0.12 THOUSAND/UL (ref 0–0.4)
EOSINOPHIL NFR BLD MANUAL: 1 % (ref 0–6)
ERYTHROCYTE [DISTWIDTH] IN BLOOD BY AUTOMATED COUNT: 13.8 % (ref 11.6–15.1)
GFR SERPL CREATININE-BSD FRML MDRD: 21 ML/MIN/1.73SQ M
GLUCOSE SERPL-MCNC: 72 MG/DL (ref 65–140)
HCT VFR BLD AUTO: 29.6 % (ref 34.8–46.1)
HGB BLD-MCNC: 8.3 G/DL (ref 11.5–15.4)
LYMPHOCYTES # BLD AUTO: 1.32 THOUSAND/UL (ref 0.6–4.47)
LYMPHOCYTES # BLD AUTO: 11 % (ref 14–44)
MCH RBC QN AUTO: 28 PG (ref 26.8–34.3)
MCHC RBC AUTO-ENTMCNC: 28 G/DL (ref 31.4–37.4)
MCV RBC AUTO: 100 FL (ref 82–98)
MONOCYTES # BLD AUTO: 0.72 THOUSAND/UL (ref 0–1.22)
MONOCYTES NFR BLD: 6 % (ref 4–12)
NEUTROPHILS # BLD MANUAL: 9.71 THOUSAND/UL (ref 1.85–7.62)
NEUTS BAND NFR BLD MANUAL: 1 % (ref 0–8)
NEUTS SEG NFR BLD AUTO: 80 % (ref 43–75)
PLATELET # BLD AUTO: 240 THOUSANDS/UL (ref 149–390)
PLATELET BLD QL SMEAR: ADEQUATE
PMV BLD AUTO: 9.5 FL (ref 8.9–12.7)
POTASSIUM SERPL-SCNC: 3.8 MMOL/L (ref 3.5–5.3)
RBC # BLD AUTO: 2.96 MILLION/UL (ref 3.81–5.12)
RBC MORPH BLD: NORMAL
SODIUM SERPL-SCNC: 133 MMOL/L (ref 135–147)
WBC # BLD AUTO: 11.99 THOUSAND/UL (ref 4.31–10.16)

## 2023-05-12 RX ORDER — BISACODYL 10 MG
10 SUPPOSITORY, RECTAL RECTAL DAILY
Status: DISCONTINUED | OUTPATIENT
Start: 2023-05-12 | End: 2023-05-13 | Stop reason: HOSPADM

## 2023-05-12 RX ORDER — CEFAZOLIN SODIUM 1 G/50ML
1000 SOLUTION INTRAVENOUS EVERY 8 HOURS
Status: DISCONTINUED | OUTPATIENT
Start: 2023-05-12 | End: 2023-05-13 | Stop reason: HOSPADM

## 2023-05-12 RX ADMIN — CEFAZOLIN SODIUM 1000 MG: 1 SOLUTION INTRAVENOUS at 22:20

## 2023-05-12 RX ADMIN — ARIPIPRAZOLE 20 MG: 5 TABLET ORAL at 08:57

## 2023-05-12 RX ADMIN — PANTOPRAZOLE SODIUM 40 MG: 40 TABLET, DELAYED RELEASE ORAL at 05:26

## 2023-05-12 RX ADMIN — BISACODYL 10 MG: 10 SUPPOSITORY RECTAL at 12:19

## 2023-05-12 RX ADMIN — CALCIUM ACETATE 667 MG: 667 CAPSULE ORAL at 08:58

## 2023-05-12 RX ADMIN — FOLIC ACID 1 MG: 1 TABLET ORAL at 08:58

## 2023-05-12 RX ADMIN — CALCIUM ACETATE 667 MG: 667 CAPSULE ORAL at 12:19

## 2023-05-12 RX ADMIN — VENLAFAXINE 75 MG: 37.5 TABLET ORAL at 21:45

## 2023-05-12 RX ADMIN — POLYETHYLENE GLYCOL 3350 17 G: 17 POWDER, FOR SOLUTION ORAL at 08:57

## 2023-05-12 RX ADMIN — OXYBUTYNIN CHLORIDE 15 MG: 5 TABLET, EXTENDED RELEASE ORAL at 21:45

## 2023-05-12 RX ADMIN — DOCUSATE SODIUM 100 MG: 100 CAPSULE, LIQUID FILLED ORAL at 17:57

## 2023-05-12 RX ADMIN — CALCIUM ACETATE 667 MG: 667 CAPSULE ORAL at 17:57

## 2023-05-12 RX ADMIN — CEFAZOLIN SODIUM 1000 MG: 1 SOLUTION INTRAVENOUS at 14:59

## 2023-05-12 RX ADMIN — CEFAZOLIN SODIUM 1000 MG: 1 SOLUTION INTRAVENOUS at 09:04

## 2023-05-12 RX ADMIN — Medication 250 MG: at 17:57

## 2023-05-12 RX ADMIN — Medication 250 MG: at 08:57

## 2023-05-12 RX ADMIN — VENLAFAXINE 75 MG: 37.5 TABLET ORAL at 17:57

## 2023-05-12 RX ADMIN — DOCUSATE SODIUM 100 MG: 100 CAPSULE, LIQUID FILLED ORAL at 08:57

## 2023-05-12 RX ADMIN — VENLAFAXINE 75 MG: 37.5 TABLET ORAL at 08:57

## 2023-05-12 NOTE — ASSESSMENT & PLAN NOTE
- Patient sent to hospital by Nephro for MONROE and metabolic acidosis discovered on recent labs - followed by Nephrology for chronic cystitis, kidney disease w/ proteinuria (HTN, obstructive uropathy, underlying glomerular pathology, age-related nephron loss pathology)    - CT abd revealing no obstructive uropathy, mild left hydronephrosis and hydrorureter (pyelonephritis not excluded), persistent circumferential bladder wall thickening possibly indicating cystitis as well as severe constipation  Renal US- Redemonstration of bilateral hydronephrosis which appears slightly worsened as compared to ultrasound of 5/4/2023  There is echogenic debris within the bilateral collecting systems as well as the bladder which may represent cellular debris versus blood products, Stable positioning of right ureteral stent, Nonobstructing left renal calculus measuring 5 mm, Redemonstration of circumferential urinary bladder wall thickening compatible with chronic urinary retention versus cystitis  There is a tiny focus of air within suggested within the bladder as can be seen in the setting of cystitis versus recent   catheterization/instrumentation   Blood Cx- Negative     Plan   Appreciate nephrology recommendations- Will need renal biopsy in near future, not emergent can do as outpatient, Cr improving   Appreciate Urology recommendations- s/p Bilateral stent placement/exchange   Per urology-patient to have follow-up renal ultrasound 1 week from discharge  Patient advised to obtain a basic metabolic panel to assess renal function prior to her follow-up appointment with urology  Follow-up with urology in 2 weeks-an appointment has been set up  Transition patient to Keflex 500mg q6h at discharge for a total of 14 days of antibiotics

## 2023-05-12 NOTE — DISCHARGE INSTR - AVS FIRST PAGE
Dear Audrey Coates,     It was our pleasure to care for you here at [facility]  It is our hope that we were always able to exceed the expected standards for your care during your stay  You were hospitalized due to pyelonephritis, an infectino of the kidneys   You were cared for on the [facility] 4th floor by Romeo Hernandez MD under the service of Ronda Delvalle MD with the [facility] who covers for your primary care physician (PCP), Bull Robbins DO, while you were hospitalized  If you have any questions or concerns related to this hospitalization, you may contact us at [phone]  For follow up as well as any medication refills, we recommend that you follow up with your primary care physician  A registered nurse will reach out to you by phone within a few days after your discharge to answer any additional questions that you may have after going home  However, at this time we provide for you here, the most important instructions / recommendations at discharge:     Notable Medication Adjustments -   Please start taking cephalexin (Keflex) 250mg, 1 capsule every 8 hours  Please continue taking the rest of your medications as prescribed  Testing Required after Discharge -   Please obtain a renal utrasound 1 week after discharge  This has been ordered for you by urology  Repeat a basic metabolic panel (bloodwork) prior to your next appointment with urology  ** Please contact your PCP to request testing orders for any of the testing recommended here **  Important follow up information -   Please follow up with your primary care provider within a week of discharge  Please follow up with urology on outpatient basis  Please follow up with nephrology on outpatient basis     Other Instructions -   Please contact your primary care provider, call 911 or go to the nearest emergency department for worsening symptoms  Practice Social distancing  Please practie adequate hand washing techinque  Wear a mask in public at all times  Please review this entire after visit summary as additional general instructions including medication list, appointments, activity, diet, any pertinent wound care, and other additional recommendations from your care team that may be provided for you        Sincerely,     Abdirizak Alaniz MD

## 2023-05-12 NOTE — TELEPHONE ENCOUNTER
Post-Test Genetic Counseling Consult Note    Today I left a voicemail for Ragini reviewing the results of her genetic test for hereditary cancer  Ragini met previously with Rod Guevara on 4/19/23 for pre-test counseling  I encouraged Ragini to call [phone] to discuss this result further  A copy of this consult note and genetic test result will be shared with the patient  SUMMARY:    Test(s): CancerNext-Expanded (68 genes): AIP, ALK, APC, ORALIA, AXIN2, BAP1, BARD1, BLM, BMPR1A, BRCA1, BRCA2, BRIP1, CDC73, CDH1, CDK4, CDKN1B, CDKN2A, CHEK2, CTNNA1, DICER1, EGFR, EGLN1, EPCAM, FANCC, FH, FLCN, GALNT12, GREM1, HOXB13, KIF1B, KIT, LZTR1, MAX, MEN1, MET, MITF, MLH1, MSH2, MSH3, MSH6, MUTYH, NBN, NF1, NF2, NTHL1, PALB2, PDGFRA, PHOX2B, PMS2, POLD1, POLE, POT1, YTEOJ6N, PTCH1, PTEN, RAD51C, RAD51D, RB1, RECQL, RET, SDHA, SDHAF2, SDHB, SDHC, SDHD, SMAD4, SMARCA4, SMARCB1, SMARCE1, STK11, SUFU, VVML785, TP53, TSC1, TSC2, VHL, XRCC2      Result: Variant of uncertain significance     MAX c 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>G (p I131V); heterozygous; uncertain significance     Assessment:  A variant of uncertain significance (VUS) means that a change was identified in a specific gene but it cannot be determined whether the variant is associated with an increased risk of cancer or is a harmless genetic change  The significance of the MAX' variant is currently not known and therefore this test result cannot be used to help determine Ragini cancer risks  It is possible that the variant was seen in only a handful of individuals, or there may be conflicting or incomplete information in the medical literature about the variant and its association with hereditary cancer  Risks and Testing for Family Members:  Genetic testing for this variant is not recommended for relatives who wish to determine their cancer risks for purposes of determining medical management   The presence or absence of this variant in a relative is not clinically meaningful unless the variant is reclassified in the future  The laboratory will continue to accumulate information on this variant and will reclassify it as either a positive or negative genetic test result when they are confident that they have adequate information  As updated information is obtained, we will notify Ragini with the updated information  It is important to note that the majority of variants of uncertain significance are reclassified as likely benign or benign as additional information about the variant becomes available  Despite this result, Ragini's first-degree relatives may be at increased risk for the cancers based on the family history  We recommend they discuss screening and management recommendations with their healthcare providers  If Ragini has any affected family members with a cancer diagnosis, especially at a young age, they may still consider genetic testing  Relatives who wish to pursue genetic testing can reach out to the [address] [place] [address] Road ([id number]) to schedule an appointment or visit www [facility] org to identify a local genetic counselor  Risk Based on Family History:  The significance of this variant is currently not known and therefore this test result cannot be used to help determine Ragini cancer risks  Rather her personal medical history and family history of cancer are the most important factors used to estimate her risk for developing certain cancers and to direct her medical management  Ragini's risk of ovarian cancer is increased, based on the reported family history  Ragini's ovaries were surgically removed, which reduces her chance to develop this type of cancer  Zhens risk of breast cancer may still be increased based on her personal risk factors and family history  Despite a negative test result, we are able to run risk models to better estimate Ragini's lifetime risk of developing breast cancer   I ran two risk models based on the information Ragini provided during our pre-test counseling session:    MaryerCyn model: Estimated lifetime risk, to age 80, for breast cancer is 6% compared to approximately 5 8% general population risk     Haydee Giles model:Estimated lifetime risk, to age 80, for breast cancer is 13 1% compared to approximately 7 8% general population risk     Although these risk models do not predict a significantly increased lifetime risk, we cannot eliminate the possibility of an increased risk for breast cancer for Ragini given her family history  I also reminded Ragini that there is still a background risk for any woman to develop breast cancer over her lifetime (12-13%), regardless of family history  Plan:   There are no additional recommendations based on Ragini's result  she should continue cancer screening and medical management as clinically indicated and as determined appropriate by her healthcare providers  VUS Result: Ragini was strongly encouraged to contact us regarding any changes in her personal or family history of cancer as these changes could alter our recommendation regarding genetic testing and/or cancer screening  Ragini was also encouraged to follow up with us on an annual basis as variant classifications are subject to change

## 2023-05-12 NOTE — PROGRESS NOTES
[facility]  Progress Note  Name: Augustus Nagel  MRN: [id number]  Unit/Bed#: S -52 I Date of Admission: 5/4/2023   Date of Service: 5/12/2023 I Hospital Day: 8    Assessment/Plan   * Hemorrhagic cystitis w/ pyelonephritis  Assessment & Plan  - Patient sent to hospital by Nephro for MONROE and metabolic acidosis discovered on recent labs - followed by Nephrology for chronic cystitis, kidney disease w/ proteinuria (HTN, obstructive uropathy, underlying glomerular pathology, age-related nephron loss pathology)    - CT abd revealing no obstructive uropathy, mild left hydronephrosis and hydrorureter (pyelonephritis not excluded), persistent circumferential bladder wall thickening possibly indicating cystitis as well as severe constipation  Renal US- Redemonstration of bilateral hydronephrosis which appears slightly worsened as compared to ultrasound of 5/4/2023  There is echogenic debris within the bilateral collecting systems as well as the bladder which may represent cellular debris versus blood products, Stable positioning of right ureteral stent, Nonobstructing left renal calculus measuring 5 mm, Redemonstration of circumferential urinary bladder wall thickening compatible with chronic urinary retention versus cystitis   There is a tiny focus of air within suggested within the bladder as can be seen in the setting of cystitis versus recent   catheterization/instrumentation   Blood Cx- Negative     Plan   Appreciate nephrology recommendations- Will need renal biopsy in near future, not emergent can do as outpatient, Cr improving   Appreciate Urology recommendations- Bilateral stent placement/exchange Day 3, monitor renal function, continue manley until urine is draining clear, leukocytosis and kidney function improves, hopefully remove manley tomorrow   Continue Cefepime completed 8 days   Switched to Ancef today   UA culture-Staph aureus        Acute on chronic kidney disease  Assessment & Plan  -  Baseline Cr: 0 9-1 1  - Prior CDK3a now 5   - Suspect 2/2 to UTI  Estimated Creatinine Clearance: 18 6 mL/min (A) (by C-G formula based on SCr of 2 32 mg/dL (H))  Cr- 2  32 improving     Plan  Avoid nephrotoxic drugs  Avoid hypotension  Monitor BMP    History of DVT (deep vein thrombosis)  Assessment & Plan  - Patient with DVT in 2017  - Currently on Lovenox 80 mg daily  VAS US-negative    Plan  Continue heparin DVT prophylaxis    Hyperphosphatemia  Assessment & Plan  - Phosphate of 8 4 on admission  - In setting of acute on chronic disease     Plan  -Continue PhosLo     Constipation  Assessment & Plan  - Sever constipation noted on CT abdomen  - Patient reports no BM in over a week  - Patient without any abdominal pain/tenderness or distention     Plan-  Resume regular diet  Bisacodyl suppository today and PRN     Severe protein-calorie malnutrition (HCC)  Assessment & Plan  Malnutrition Findings:   Adult Malnutrition type: Chronic illness  Adult Degree of Malnutrition: Other severe protein calorie malnutrition  Malnutrition Characteristics: Muscle loss, Fat loss                  360 Statement: inadequate intake related to poor appetite as evidenced by dark sunken orbital, wasted temporalis, buccal pads, hollow supraclavicular space, wasted interosseous  Int: Advance diet to regular, as medically indicated,  medical food supplements  BMI Findings: Body mass index is 23 43 kg/m²  Endometrial cancer [facility])  Assessment & Plan  - Diagnosis '14; s/p total abdominal hysterectomy and bilateral salpingo oophorectomy   - Status post ENDOBARR (rucaparib, atezolizumab, and bevacizumab) clinical trial started in 12/2020 and finished 12/2022  - She is currently under active surveillance               VTE Pharmacologic Prophylaxis: VTE Score: 6 High Risk (Score >/= 5) - Pharmacological DVT Prophylaxis Contraindicated  Sequential Compression Devices Ordered   AC on hold due to hemorrhagic cystitis, will restart     Patient Centered Rounds: I performed bedside rounds with nursing staff today  Discussions with Specialists or Other Care Team Provider: Urology, Nephrology     Education and Discussions with Family / Patient: Updated  (sister) via phone  Current Length of Stay: 8 day(s)  Current Patient Status: Inpatient   Discharge Plan: Anticipate discharge tomorrow to home with home services  Code Status: Level 1 - Full Code    Subjective:   Patient was seen and examined at the bedside  Patient was resting in the chair comfortably no acute distress eating breakfast   Patient reports she feels significantly better from when she came in  Patient no longer is experiencing right flank pain  Patient denies fever, chills, chest pain, shortness of breath, palpitations, abdominal pain, NVD, leg pain, or any other symptoms at this time  Objective:     Vitals:   Temp (24hrs), Av 3 °F (36 8 °C), Min:98 1 °F (36 7 °C), Max:98 5 °F (36 9 °C)    Temp:  [98 1 °F (36 7 °C)-98 5 °F (36 9 °C)] 98 1 °F (36 7 °C)  HR:  [81-99] 81  Resp:  [16-18] 18  BP: (107-114)/(62-75) 107/62  SpO2:  [96 %-98 %] 96 %  Body mass index is 23 43 kg/m²  Input and Output Summary (last 24 hours): Intake/Output Summary (Last 24 hours) at 2023 1443  Last data filed at 2023 0601  Gross per 24 hour   Intake 240 ml   Output 3400 ml   Net -3160 ml       Physical Exam:   Physical Exam  Vitals and nursing note reviewed  Constitutional:       General: She is not in acute distress  Appearance: She is well-developed  She is ill-appearing  She is not toxic-appearing or diaphoretic  Comments: Improved from yesterday   HENT:      Head: Normocephalic and atraumatic  Mouth/Throat:      Mouth: Mucous membranes are moist    Eyes:      Extraocular Movements: Extraocular movements intact  Conjunctiva/sclera: Conjunctivae normal       Pupils: Pupils are equal, round, and reactive to light  Cardiovascular:      Rate and Rhythm: Normal rate and regular rhythm  Pulses: Normal pulses  Heart sounds: Normal heart sounds  No murmur heard  No friction rub  No gallop  Pulmonary:      Effort: Pulmonary effort is normal  No respiratory distress  Breath sounds: Normal breath sounds  No wheezing or rhonchi  Chest:      Chest wall: No tenderness  Abdominal:      General: Bowel sounds are normal       Palpations: Abdomen is soft  Tenderness: There is no abdominal tenderness  There is no right CVA tenderness, left CVA tenderness, guarding or rebound  Musculoskeletal:         General: No tenderness  Normal range of motion  Cervical back: Neck supple  Right lower leg: No edema  Left lower leg: No edema  Skin:     General: Skin is warm and dry  Capillary Refill: Capillary refill takes less than 2 seconds  Neurological:      General: No focal deficit present  Mental Status: She is alert and oriented to person, place, and time  Cranial Nerves: No cranial nerve deficit  Sensory: No sensory deficit  Motor: No weakness  Coordination: Coordination normal    Psychiatric:         Mood and Affect: Mood normal          Behavior: Behavior normal          Thought Content:  Thought content normal           Additional Data:     Labs:  Results from last 7 days   Lab Units 05/12/23  0526   WBC Thousand/uL 11 99*   HEMOGLOBIN g/dL 8 3*   HEMATOCRIT % 29 6*   PLATELETS Thousands/uL 240   BANDS PCT % 1   LYMPHO PCT % 11*   MONO PCT % 6   EOS PCT % 1     Results from last 7 days   Lab Units 05/12/23  0526 05/07/23  0530 05/06/23  0535   SODIUM mmol/L 133*   < > 139   POTASSIUM mmol/L 3 8   < > 4 1   CHLORIDE mmol/L 99   < > 108   CO2 mmol/L 24   < > 21   BUN mg/dL 42*   < > 65*   CREATININE mg/dL 2 32*   < > 3 15*   ANION GAP mmol/L 10   < > 10   CALCIUM mg/dL 8 9   < > 8 5   ALBUMIN g/dL  --   --  2 9*   TOTAL BILIRUBIN mg/dL  --   --  0 29   ALK PHOS U/L  -- --  90   ALT U/L  --   --  5*   AST U/L  --   --  6*   GLUCOSE RANDOM mg/dL 72   < > 85    < > = values in this interval not displayed  Results from last 7 days   Lab Units 05/08/23  0515   INR  1 03                   Lines/Drains:  Invasive Devices     Central Venous Catheter Line  Duration           Port A Cath 11/12/19 Left Chest 1277 days          Peripheral Intravenous Line  Duration           Peripheral IV 05/12/23 Proximal;Right;Ventral (anterior) Forearm <1 day          Drain  Duration           Ureteral Internal Stent Right ureter 6 Fr  43 days    Ureteral Internal Stent Left ureter 7 Fr  3 days    Ureteral Internal Stent Right ureter 7 Fr  3 days    Urethral Catheter Latex 20 Fr  3 days              Urinary Catheter:  Goal for removal: Voiding trial initiated         Central Line:  Goal for removal: N/A - Chronic PICC             Imaging: Reviewed radiology reports from this admission including: abdominal/pelvic CT and ultrasound(s)    Recent Cultures (last 7 days):   Results from last 7 days   Lab Units 05/10/23  1415 05/09/23  1336   BLOOD CULTURE  No Growth at 24 hrs    No Growth at 24 hrs   --    URINE CULTURE   --  70,000-79,000 cfu/ml Staphylococcus aureus*  >100,000 cfu/ml Candida albicans*       Last 24 Hours Medication List:   Current Facility-Administered Medications   Medication Dose Route Frequency Provider Last Rate   • acetaminophen  650 mg Oral Q4H PRN Alec Schaefer MD     • ARIPiprazole  20 mg Oral Daily Alec Schaefer MD     • bisacodyl  10 mg Rectal Daily Alec Schaefer MD     • calcium acetate  667 mg Oral TID With Meals Alec Schaefer MD     • cefazolin  1,000 mg Intravenous Regine Cantrell MD 1,000 mg (05/12/23 0904)   • docusate sodium  100 mg Oral BID Alec Schaefer MD     • folic acid  1 mg Oral Daily Alec Schaefer MD     • HYDROmorphone  0 2 mg Intravenous Q4H PRN Angela Martinez MD     • ondansetron  4 mg Intravenous Q4H PRN Leann Mehta MD     • oxybutynin  15 mg Oral HS Delvin Austin MD     • oxyCODONE  5 mg Oral Q4H PRN Delvin Austin MD     • oxyCODONE  2 5 mg Oral Q4H PRN Delvin Austin MD     • pantoprazole  40 mg Oral Early Morning Delvin Austin MD     • polyethylene glycol  17 g Oral Daily Delvin Austin MD     • saccharomyces boulardii  250 mg Oral BID Delvin Austin MD     • venlafaxine  75 mg Oral TID Delvin Austin MD          Today, Patient Was Seen By: Delvin Austin MD    **Please Note: This note may have been constructed using a voice recognition system  **

## 2023-05-12 NOTE — PHYSICAL THERAPY NOTE
PHYSICAL THERAPY NOTE    Patient Name: Kamlesh Saba  WGWNS'R Date: 232   PT Last Visit   PT Visit Date 23   Note Type   Note Type Treatment   Pain Assessment   Pain Assessment Tool 0-10   Pain Score No Pain   Restrictions/Precautions   Weight Bearing Precautions Per Order No   Other Precautions Contact/isolation; Fall Risk; Chair Alarm; Bed Alarm;Multiple lines  (masimo, manley catheter)   General   Family/Caregiver Present No   Subjective   Subjective Patient seated OOB in recliner and is agreeable to participate in therapy session  Pt identifers obtained from name &   Bed Mobility   Supine to Sit Unable to assess   Sit to Supine Unable to assess   Additional Comments Patient seated OOB in recliner pre and post session with chair alarm engaged, call bell and belongings in reach  Transfers   Sit to Stand 5  Supervision   Additional items Assist x 1; Armrests; Increased time required   Stand to Sit 5  Supervision   Additional items Assist x 1; Armrests; Increased time required   Ambulation/Elevation   Gait pattern Short stride;Decreased foot clearance; Step through pattern   Gait Assistance 5  Supervision   Additional items Assist x 1;Verbal cues   Assistive Device Rolling walker   Distance 45' x1  (with turns and obstacles)   Stair Management Assistance   (declined trial)   Balance   Static Sitting Good   Dynamic Sitting Fair   Static Standing Fair   Ambulatory Fair   Endurance Deficit   Endurance Deficit Yes   Endurance Deficit Description limited ambulation and activity tolerance   Activity Tolerance   Activity Tolerance Patient limited by fatigue   Nurse Made Aware Spoke to DANISH Tyler   Assessment   Problem List Decreased strength;Decreased endurance; Impaired balance;Decreased mobility; Decreased safety awareness   Assessment Patient agreeable to participate in therapy session   Patient remains consistent throughout session with supervision for sit<>stand transfers with increased time and good technique and positioning  Patient able to ambulate increased gait distance with roller walker however does remain limited to short distances secondary to fatigue  Pt denies any dizziness or nausea post ambulation this session  Pt declined further ambulation trials and stair training secondary to fatigue  Continue to focus on OOB mobility with ambulation tolerance progression and initiation of stair training as appropriate and able  Goals   Patient Goals none stated   STG Expiration Date 05/21/23   PT Treatment Day 1   Plan   Treatment/Interventions Functional transfer training;LE strengthening/ROM; Endurance training;Patient/family training;Equipment eval/education; Bed mobility;Gait training;Spoke to nursing   PT Frequency 3-5x/wk   Recommendation   PT Discharge Recommendation Home with home health rehabilitation   AM-PAC Basic Mobility Inpatient   Turning in Flat Bed Without Bedrails 4   Lying on Back to Sitting on Edge of Flat Bed Without Bedrails 3   Moving Bed to Chair 3   Standing Up From Chair Using Arms 3   Walk in Room 3   Climb 3-5 Stairs With Railing 2   Basic Mobility Inpatient Raw Score 18   Basic Mobility Standardized Score 41 05   Highest Level Of Mobility   JH-HLM Goal 6: Walk 10 steps or more   JH-HLM Achieved 7: Walk 25 feet or more   End of Consult   Patient Position at End of Consult Bedside chair; All needs within reach     The patient's AM-PAC Basic Mobility Inpatient Short Form Raw Score is 18  A Raw score of greater than 16 suggests the patient may benefit from discharge to home  Please also refer to the recommendation of the Physical Therapist for safe discharge planning        Alissa Don PTA

## 2023-05-12 NOTE — ASSESSMENT & PLAN NOTE
- Sever constipation noted on CT abdomen  - Patient reports no BM in over a week  - Patient without any abdominal pain/tenderness or distention     Plan-  Resume regular diet  Bisacodyl suppository today and PRN

## 2023-05-12 NOTE — PROGRESS NOTES
"Progress Note - Urology  Ragini Melendez 1955, 74 y o  female MRN: [id number]    Unit/Bed#: S -01 Encounter: [id number]      Assessment & Plan:  1  Bilateral hydronephrosis  - Status post bilateral retrograde pyelogram, left ureteral stent placement, right ureteral stent exchange 05/9/23  - WBC 11 99, improving from 17 63  - Creatinine 2 32, yesterday 2 65  Baseline 1 0  Nephrology following, plan for outpatient kidney biopsy to evaluate for glomerular process  - Vital signs stable, afebrile  - Manley catheter draining yellow urine, mild white purulence in Manley tubing  - TOV today  If patient retains, manley catheter to be replaced  Monitor PVRs today  - Repeat ultrasound in 1 week to reevaluate hydronephrosis after ureteral stent placement  - Close outpatient follow up with nephrology to monitor kidney function   - Urology with limited availability over the weekend  We will sign off, available as needed  Subjective:   HPI: Patient seen at bedside this morning  Denies any flank, abdominal pain  No fever, chills, cough, shortness of breath, chest pain  Review of Systems   Constitutional: Negative for chills and fever  Respiratory: Negative for cough and shortness of breath  Cardiovascular: Negative for chest pain and palpitations  Gastrointestinal: Negative for abdominal pain and vomiting  Genitourinary: Negative for dysuria and hematuria  Musculoskeletal: Negative for arthralgias and back pain  Skin: Negative for color change and rash  Neurological: Negative for seizures and syncope  All other systems reviewed and are negative  Objective:  Vitals: Blood pressure 107/62, pulse 81, temperature 98 1 °F (36 7 °C), resp  rate 18, height 4' 11\" (1 499 m), SpO2 96 %, not currently breastfeeding  ,Body mass index is 23 43 kg/m²  Physical Exam  Vitals reviewed  Constitutional:       General: She is not in acute distress  Appearance: Normal appearance   She is normal " weight  She is ill-appearing  She is not toxic-appearing or diaphoretic  HENT:      Head: Normocephalic and atraumatic  Right Ear: External ear normal       Left Ear: External ear normal       Nose: Nose normal       Mouth/Throat:      Mouth: Mucous membranes are moist    Eyes:      General: No scleral icterus  Conjunctiva/sclera: Conjunctivae normal    Cardiovascular:      Rate and Rhythm: Normal rate  Pulses: Normal pulses  Pulmonary:      Effort: Pulmonary effort is normal    Abdominal:      General: Abdomen is flat  There is no distension  Palpations: Abdomen is soft  Tenderness: There is no abdominal tenderness  There is no right CVA tenderness, left CVA tenderness or guarding  Musculoskeletal:         General: Normal range of motion  Cervical back: Normal range of motion  Skin:     General: Skin is warm  Findings: No rash  Neurological:      General: No focal deficit present  Mental Status: She is alert and oriented to person, place, and time  Mental status is at baseline  Psychiatric:         Mood and Affect: Mood normal          Behavior: Behavior normal          Thought Content:  Thought content normal          Judgment: Judgment normal            Labs:  Recent Labs     05/10/23  0500 05/11/23  0506 05/12/23  0526   WBC 22 44* 17 63* 11 99*     Recent Labs     05/10/23  0500 05/11/23  0506 05/12/23  0526   HGB 8 4* 8 2* 8 3*     Recent Labs     05/10/23  0500 05/11/23  0506 05/12/23  0526   HCT 29 4* 27 4* 29 6*     Recent Labs     05/10/23  0645 05/11/23  0506 05/12/23  0526   CREATININE 2 76* 2 65* 2 32*         History:  Past Medical History:   Diagnosis Date   • Anxiety    • CKD (chronic kidney disease) stage 3, GFR 30-59 ml/min ([address] )    • COVID     Fall 2022   • Depression    • Endometrial cancer ([address] ) 12/2017   • GERD (gastroesophageal reflux disease)    • H/O gastric bypass    • Hard to intubate     pt denies   • History of chemotherapy     started 12/2021endometrial cancer- done 12/23/22   • History of DVT in adulthood     RLE   • Hyperglycemia     vx type 2 dm -- last assessed 4/1/14; resolved 11/7/17   • Hyperlipidemia    • Hypertension     in past- no meds at present   • Iron deficiency anemia     iron infusions weekly   • OAB (overactive bladder)    • Port-A-Cath in place    • Wears glasses      Social History     Socioeconomic History   • Marital status: Single     Spouse name: None   • Number of children: None   • Years of education: None   • Highest education level: None   Occupational History   • None   Tobacco Use   • Smoking status: Never   • Smokeless tobacco: Never   Vaping Use   • Vaping Use: Never used   Substance and Sexual Activity   • Alcohol use: Not Currently   • Drug use: No   • Sexual activity: Not Currently   Other Topics Concern   • None   Social History Narrative   • None     Social Determinants of Health     Financial Resource Strain: Not on file   Food Insecurity: No Food Insecurity   • Worried About Running Out of Food in the Last Year: Never true   • Ran Out of Food in the Last Year: Never true   Transportation Needs: No Transportation Needs   • Lack of Transportation (Medical): No   • Lack of Transportation (Non-Medical):  No   Physical Activity: Not on file   Stress: Not on file   Social Connections: Not on file   Intimate Partner Violence: Not on file   Housing Stability: Unknown   • Unable to Pay for Housing in the Last Year: No   • Number of Places Lived in the Last Year: Not on file   • Unstable Housing in the Last Year: No     Past Surgical History:   Procedure Laterality Date   • ABDOMINAL SURGERY      GASTRIC BYPASS; 2001   • BREAST BIOPSY Right 06/28/2019    core biopsy; benign   • CHOLECYSTECTOMY      at the time of gastric bypass   • COLONOSCOPY     • CT NEEDLE BIOPSY LYMPH NODE  07/08/2019   • FL GUIDED CENTRAL VENOUS ACCESS DEVICE INSERTION  11/12/2019   • FL RETROGRADE PYELOGRAM  3/30/2023   • FL RETROGRADE PYELOGRAM 5/9/2023   • GASTRIC BYPASS     • HYSTERECTOMY Bilateral 2017    total abdominal with salpingo-oophorectomy   • IR NEPHROSTOMY TO NEPHROURETERAL STENT  06/09/2022   • IR NEPHROSTOMY TO NEPHROURETERAL STENT  12/20/2022   • IR NEPHROSTOMY TUBE CHECK/CHANGE/REPOSITION/REINSERTION/UPSIZE  05/27/2022   • IR NEPHROSTOMY TUBE PLACEMENT  07/26/2019   • IR NEPHROURETERAL STENT CHECK/CHANGE/REPOSITION  04/06/2021   • IR NEPHROURETERAL STENT CHECK/CHANGE/REPOSITION  06/04/2021   • IR NEPHROURETERAL STENT CHECK/CHANGE/REPOSITION  09/03/2021   • IR NEPHROURETERAL STENT CHECK/CHANGE/REPOSITION  12/07/2021   • IR NEPHROURETERAL STENT CHECK/CHANGE/REPOSITION  03/08/2022   • IR NEPHROURETERAL STENT CHECK/CHANGE/REPOSITION  05/10/2022   • IR NEPHROURETERAL STENT CHECK/CHANGE/REPOSITION  09/19/2022   • IR PICC LINE  09/27/2019   • IR PORT PLACEMENT  07/26/2019   • IR PORT REMOVAL  09/20/2019   • OOPHORECTOMY Bilateral 2017   • PA CYSTO BLADDER W/URETERAL CATHETERIZATION Right 3/30/2023    Procedure: CYSTOSCOPY RETROGRADE PYELOGRAM WITH exchange of STENT URETERAL;  Surgeon: Brenda Dutta MD;  [facility];  Service: Urology   • PA CYSTO BLADDER W/URETERAL CATHETERIZATION Bilateral 5/9/2023    Procedure: CYSTOSCOPY, BILATERAL RETROGRADE PYELOGRAM, WITH LEFT INSERTION STENT URETERAL, RIGHT URTERAL STENT EXCHANGE;  Surgeon: Sumaya Boswell MD;  [facility];  Service: Urology   • PA CYSTO W/INSERT URETERAL STENT Right 3/30/2023    Procedure: EXCHANGE STENT URETERAL;  Surgeon: Brenda Dutta MD;  [facility];  Service: Urology   • PA INSJ TUNNELED CTR VAD W/SUBQ PORT AGE 5 YR/> Left 11/12/2019    Procedure: INSERTION VENOUS PORT ( PORT-A-CATH) IR;  Surgeon: Jean Paul Sage DO;  [facility];  Service: Interventional Radiology   • PA LAPS ABD PRTM&OMENTUM DX W/WO SPEC BR/WA SPX N/A 12/19/2017    Procedure: LAPAROSCOPY DIAGNOSTIC;  Surgeon: Linda Hernandez MD;  Location: [facility]; Service: Gynecology Oncology   • MA LAPS W/RAD HYST W/BILAT QUARTERMAIN RMVL TUBE/OVARY N/A 12/19/2017    Procedure: HYSTERECTOMY LAPAROSCOPIC TOTAL ([address]) W/ ROBOTICS; BILATERAL SALPINGOOPHERECTOMY; LYMPH NODE DISSECTION; lysis of adhesions;  Surgeon: Dion Rodrigues MD;  [facility];  Service: Gynecology Oncology   • TONSILLECTOMY     • US GUIDED BREAST BIOPSY RIGHT COMPLETE Right 06/28/2019     Family History   Problem Relation Age of Onset   • Hyperlipidemia Mother    • Heart disease Mother    • Ovarian cancer Mother 48   • Colon cancer Mother    • Lymphoma Father    • Breast cancer Sister 61   • No Known Problems Brother    • No Known Problems Brother    • No Known Problems Maternal Grandmother    • Bone cancer Maternal Grandfather    • Uterine cancer Paternal Grandmother    • No Known Problems Paternal Grandfather    • No Known Problems Maternal Aunt    • No Known Problems Maternal Aunt    • No Known Problems Maternal Aunt    • No Known Problems Maternal Aunt    • No Known Problems Paternal Aunt    • No Known Problems Paternal Aunt    • No Known Problems Paternal Aunt    • No Known Problems Paternal Aunt        Ghulam Zepeda  Date: 5/12/2023 Time: 7:34 AM

## 2023-05-12 NOTE — ASSESSMENT & PLAN NOTE
- Patient sent to hospital by Nephro for [place]RO[place] and metabolic acidosis discovered on recent labs - followed by Nephrology for chronic cystitis, kidney disease w/ proteinuria (HTN, obstructive uropathy, underlying glomerular pathology, age-related nephron loss pathology)    - CT abd revealing no obstructive uropathy, mild left hydronephrosis and hydrorureter (pyelonephritis not excluded), persistent circumferential bladder wall thickening possibly indicating cystitis as well as severe constipation  Renal US- Redemonstration of bilateral hydronephrosis which appears slightly worsened as compared to ultrasound of 5/4/2023  There is echogenic debris within the bilateral collecting systems as well as the bladder which may represent cellular debris versus blood products, Stable positioning of right ureteral stent, Nonobstructing left renal calculus measuring 5 mm, Redemonstration of circumferential urinary bladder wall thickening compatible with chronic urinary retention versus cystitis   There is a tiny focus of air within suggested within the bladder as can be seen in the setting of cystitis versus recent   catheterization/instrumentation   Blood Cx- Negative     Plan   Appreciate nephrology recommendations- Will need renal biopsy in near future, not emergent can do as outpatient, Cr improving   Appreciate Urology recommendations- Bilateral stent placement/exchange Day 3, monitor renal function, continue manley until urine is draining clear, leukocytosis and kidney function improves, hopefully remove manley tomorrow   Continue Cefepime completed 8 days   Switched to Ancef today   UA culture-Staph aureus

## 2023-05-12 NOTE — ASSESSMENT & PLAN NOTE
-  Baseline Cr: 0 9-1 1  - Prior CDK3a now 5   - Suspect 2/2 to UTI  Estimated Creatinine Clearance: 18 6 mL/min (A) (by C-G formula based on SCr of 2 32 mg/dL (H))    Cr- 2  32 improving     Plan  Avoid nephrotoxic drugs  Avoid hypotension  Monitor BMP

## 2023-05-12 NOTE — ASSESSMENT & PLAN NOTE
-  Baseline Cr: 0 9-1 1  - Prior CDK3a now 5   - Suspect 2/2 to UTI  Estimated Creatinine Clearance: 18 6 mL/min (A) (by C-G formula based on SCr of 2 32 mg/dL (H))    Cr- 2  47   Will likely require kidney biopsy in the future - can follow up with her outpatient nephrologist    Plan  Repeat BMP prior to her next appointment with urology  Follow up with her nephrologist on outpatient basis

## 2023-05-12 NOTE — PROGRESS NOTES
NEPHROLOGY PROGRESS NOTE    Ragini Melendez 74 y o  female MRN: [id number]  Unit/Bed#: S -01 Encounter: [id number]  Reason for Consult: Acute on chronic kidney disease    Patient is awake and alert sitting up in a chair looks great  Still has Sifuentes catheter in says that she was a little weak with walking but able to get around  Otherwise no complaints  ASSESSMENT/PLAN:  1  Renal    Patient is acute on chronic kidney disease  She was seen by one of my associates as an outpatient and had significant proteinuria  During this hospitalization she had some hydronephrosis and had bilateral ureteral stents and a Sifuentes catheter placed at the same time  Creatinine has been improving since that event and is down to 2 3  Unsure how low it will eventually go but will continue urology follow-up and Sifuentes catheter per the recommendation  Also as an outpatient she had very heavy proteinuria suspicious for glomerular disease  Some serologies have been sent so far unrevealing and some are still pending  Follow-up on serologies that are pending  Consider renal biopsy if has persistently heavy proteinuria  I am repeating a urine protein to creatinine ratio to see if there is been any changes  Of note she has been on avastin in the past but not since September and this can cause renal insufficiency there have been reported cases of TMA but she has been off of this a long time so unclear if this is the etiology  BMP a m  Follow-up on serologies pending  Repeat urine protein to creatinine ratio  Urology follow-up continue with Sifuentes catheter and ureteral stent care    Once discharged she will follow-up with her primary nephrologist       SUBJECTIVE:  Review of Systems   Constitutional: Negative for chills, diaphoresis, fever and night sweats  HENT: Negative  Eyes: Negative  Cardiovascular: Negative for chest pain, dyspnea on exertion, leg swelling and orthopnea  Respiratory: Negative    Negative for "cough, shortness of breath, sputum production and wheezing  Gastrointestinal: Negative for abdominal pain, diarrhea, nausea and vomiting  Genitourinary: Sifuentes catheter in  Neurological: Negative for dizziness, focal weakness and headaches  Feels a little weak with walking  Psychiatric/Behavioral: Negative for altered mental status, depression, hallucinations and hypervigilance  OBJECTIVE:  Current Weight:    Vitals:Temp (24hrs), Av 3 °F (36 8 °C), Min:98 1 °F (36 7 °C), Max:98 5 °F (36 9 °C)  Current: Temperature: 98 1 °F (36 7 °C)   Blood pressure 107/62, pulse 81, temperature 98 1 °F (36 7 °C), resp  rate 18, height 4' 11\" (1 499 m), SpO2 96 %, not currently breastfeeding  , Body mass index is 23 43 kg/m²  Intake/Output Summary (Last 24 hours) at 2023 1204  Last data filed at 2023 0601  Gross per 24 hour   Intake 240 ml   Output 3400 ml   Net -3160 ml       Physical Exam: /62   Pulse 81   Temp 98 1 °F (36 7 °C)   Resp 18   Ht 4' 11\" (1 499 m) Comment: last ht assessment  LMP  (LMP Unknown)   SpO2 96%   BMI 23 43 kg/m²   Physical Exam  Constitutional:       General: She is not in acute distress  Appearance: She is not toxic-appearing or diaphoretic  HENT:      Head: Normocephalic and atraumatic  Nose: Nose normal       Mouth/Throat:      Mouth: Mucous membranes are moist    Eyes:      General: No scleral icterus  Extraocular Movements: Extraocular movements intact  Cardiovascular:      Rate and Rhythm: Normal rate and regular rhythm  Heart sounds: No friction rub  No gallop  Pulmonary:      Effort: Pulmonary effort is normal  No respiratory distress  Breath sounds: No wheezing, rhonchi or rales  Abdominal:      General: Bowel sounds are normal  There is no distension  Palpations: Abdomen is soft  Tenderness: There is no abdominal tenderness  There is no rebound     Musculoskeletal:      Cervical back: Normal " range of motion and neck supple  Neurological:      General: No focal deficit present  Mental Status: She is alert and oriented to person, place, and time  Mental status is at baseline  Psychiatric:         Mood and Affect: Mood normal          Behavior: Behavior normal          Thought Content:  Thought content normal          Judgment: Judgment normal          Medications:    Current Facility-Administered Medications:   •  acetaminophen (TYLENOL) tablet 650 mg, 650 mg, Oral, Q4H PRN, Azalia Reynoso MD  •  ARIPiprazole (ABILIFY) tablet 20 mg, 20 mg, Oral, Daily, Azalia Reynoso MD, 20 mg at 05/12/23 0857  •  bisacodyl (DULCOLAX) rectal suppository 10 mg, 10 mg, Rectal, Once PRN, Azalia Reynoso MD  •  bisacodyl (DULCOLAX) rectal suppository 10 mg, 10 mg, Rectal, Daily, Azalia Reynoso MD  •  calcium acetate (PHOSLO) capsule 667 mg, 667 mg, Oral, TID With Meals, Azalia Reynoso MD, 667 mg at 05/12/23 0858  •  ceFAZolin (ANCEF) IVPB (premix in dextrose) 1,000 mg 50 mL, 1,000 mg, Intravenous, Q8H, Azalia Reynoso MD, Last Rate: 100 mL/hr at 05/12/23 0904, 1,000 mg at 05/12/23 6692  •  docusate sodium (COLACE) capsule 100 mg, 100 mg, Oral, BID, Azalia Reynoso MD, 100 mg at 37/32/30 9933  •  folic acid (FOLVITE) tablet 1 mg, 1 mg, Oral, Daily, Azalia Reynoso MD, 1 mg at 05/12/23 0858  •  HYDROmorphone HCl (DILAUDID) injection 0 2 mg, 0 2 mg, Intravenous, Q4H PRN, Debby Cintron MD, 0 2 mg at 05/10/23 0033  •  ondansetron (ZOFRAN) injection 4 mg, 4 mg, Intravenous, Q4H PRN, Mary Villela MD  •  oxybutynin (DITROPAN-XL) 24 hr tablet 15 mg, 15 mg, Oral, HS, Azalia Reynoso MD, 15 mg at 05/11/23 2150  •  oxyCODONE (ROXICODONE) IR tablet 5 mg, 5 mg, Oral, Q4H PRN, Azalia Reynoso MD, 5 mg at 05/09/23 2037  •  oxyCODONE (ROXICODONE) split tablet 2 5 mg, 2 5 mg, Oral, Q4H PRN, Azalia Reynoso MD  •  pantoprazole (PROTONIX) EC tablet 40 mg, 40 mg, Oral, Early Morning, Azalia Reynoso MD, 40 mg at 05/12/23 0526  •  polyethylene glycol (MIRALAX) packet 17 g, 17 g, Oral, Daily, Rajan Gonzalez MD, 17 g at 05/12/23 0857  •  saccharomyces boulardii (FLORASTOR) capsule 250 mg, 250 mg, Oral, BID, Rajan Gonzalez MD, 250 mg at 05/12/23 0857  •  venlafaxine [facility]) tablet 75 mg, 75 mg, Oral, TID, Rajan Gonzalez MD, 75 mg at 05/12/23 0857    Laboratory Results:  Lab Results   Component Value Date    WBC 11 99 (H) 05/12/2023    HGB 8 3 (L) 05/12/2023    HCT 29 6 (L) 05/12/2023     (H) 05/12/2023     05/12/2023     Lab Results   Component Value Date    SODIUM 133 (L) 05/12/2023    K 3 8 05/12/2023    CL 99 05/12/2023    CO2 24 05/12/2023    BUN 42 (H) 05/12/2023    CREATININE 2 32 (H) 05/12/2023    GLUC 72 05/12/2023    CALCIUM 8 9 05/12/2023     Lab Results   Component Value Date    CALCIUM 8 9 05/12/2023    PHOS 5 1 (H) 05/08/2023     No results found for: LABPROT

## 2023-05-12 NOTE — PLAN OF CARE
Problem: PHYSICAL THERAPY ADULT  Goal: Performs mobility at highest level of function for planned discharge setting  See evaluation for individualized goals  Description: Treatment/Interventions: ADL retraining, Functional transfer training, LE strengthening/ROM, Elevations, Therapeutic exercise, Endurance training, Patient/family training, Equipment eval/education, Bed mobility, Gait training, Compensatory technique education, OT, Spoke to case management  Equipment Recommended:  (Patient has a roller walker, a 3 wheeled walker and a cane)       See flowsheet documentation for full assessment, interventions and recommendations  Outcome: Progressing  Note:    Problem List: Decreased strength, Decreased endurance, Impaired balance, Decreased mobility, Decreased safety awareness  Assessment: Patient agreeable to participate in therapy session  Patient remains consistent throughout session with supervision for sit<>stand transfers with increased time and good technique and positioning  Patient able to ambulate increased gait distance with roller walker however does remain limited to short distances secondary to fatigue  Pt denies any dizziness or nausea post ambulation this session  Pt declined further ambulation trials and stair training secondary to fatigue  Continue to focus on OOB mobility with ambulation tolerance progression and initiation of stair training as appropriate and able  PT Discharge Recommendation: Home with home health rehabilitation    See flowsheet documentation for full assessment

## 2023-05-12 NOTE — ASSESSMENT & PLAN NOTE
- Phosphate of 8 4 on admission  - In setting of acute on chronic disease     Plan  -Continue PhosLo   Nephrology follow up on outpatient basis    PCP follow up

## 2023-05-12 NOTE — DISCHARGE SUMMARY
[facility]  Discharge- [facility] 1955, 74 y o  female MRN: [id number]  Unit/Bed#: S -01 Encounter: [id number]  Primary Care Provider: Crow Womack DO   Date and time admitted to hospital: 5/4/2023  5:26 PM    * Hemorrhagic cystitis w/ pyelonephritis  Assessment & Plan  - Patient sent to hospital by Nephro for MONROE and metabolic acidosis discovered on recent labs - followed by Nephrology for chronic cystitis, kidney disease w/ proteinuria (HTN, obstructive uropathy, underlying glomerular pathology, age-related nephron loss pathology)    - CT abd revealing no obstructive uropathy, mild left hydronephrosis and hydrorureter (pyelonephritis not excluded), persistent circumferential bladder wall thickening possibly indicating cystitis as well as severe constipation  Renal US- Redemonstration of bilateral hydronephrosis which appears slightly worsened as compared to ultrasound of 5/4/2023  There is echogenic debris within the bilateral collecting systems as well as the bladder which may represent cellular debris versus blood products, Stable positioning of right ureteral stent, Nonobstructing left renal calculus measuring 5 mm, Redemonstration of circumferential urinary bladder wall thickening compatible with chronic urinary retention versus cystitis   There is a tiny focus of air within suggested within the bladder as can be seen in the setting of cystitis versus recent   catheterization/instrumentation   Blood Cx- Negative     Plan   Appreciate nephrology recommendations- Will need renal biopsy in near future, not emergent can do as outpatient, Cr improving   Appreciate Urology recommendations- s/p Bilateral stent placement/exchange   Per urology-patient to have follow-up renal ultrasound 1 week from discharge  Patient advised to obtain a basic metabolic panel to assess renal function prior to her follow-up appointment with urology  Follow-up with urology in 2 weeks-an appointment has been set up  Transition patient to Keflex 500mg q6h at discharge for a total of 14 days of antibiotics  Acute on chronic kidney disease  Assessment & Plan  -  Baseline Cr: 0 9-1 1  - Prior CDK3a now 5   - Suspect 2/2 to UTI  Estimated Creatinine Clearance: 18 6 mL/min (A) (by C-G formula based on SCr of 2 32 mg/dL (H))  Cr- 2  47   Will likely require kidney biopsy in the future - can follow up with her outpatient nephrologist    Plan  Repeat BMP prior to her next appointment with urology  Follow up with her nephrologist on outpatient basis    Chronic kidney disease  Assessment & Plan  Lab Results   Component Value Date    EGFR 19 05/13/2023    EGFR 21 05/12/2023    EGFR 18 05/11/2023    CREATININE 2 47 (H) 05/13/2023    CREATININE 2 32 (H) 05/12/2023    CREATININE 2 65 (H) 05/11/2023       History of DVT (deep vein thrombosis)  Assessment & Plan  - Patient with DVT in 2017  VAS US-negative  PCP follow up    Hyperphosphatemia  Assessment & Plan  - Phosphate of 8 4 on admission  - In setting of acute on chronic disease     Plan  -Continue PhosLo   Nephrology follow up on outpatient basis  PCP follow up    Constipation  Assessment & Plan  PCP follow up    Severe protein-calorie malnutrition ([address] )  Assessment & Plan  Malnutrition Findings:   Adult Malnutrition type: Chronic illness  Adult Degree of Malnutrition: Other severe protein calorie malnutrition  Malnutrition Characteristics: Muscle loss, Fat loss                  360 Statement: inadequate intake related to poor appetite as evidenced by dark sunken orbital, wasted temporalis, buccal pads, hollow supraclavicular space, wasted interosseous  Int: Advance diet to regular, as medically indicated,  medical food supplements  BMI Findings: Body mass index is 23 43 kg/m²         Endometrial cancer [facility])  Assessment & Plan  - Diagnosis '14; s/p total abdominal hysterectomy and bilateral salpingo oophorectomy   - Status post ENDOBARR (rucaparib, atezolizumab, and bevacizumab) clinical trial started in 12/2020 and finished 12/2022  - She is currently under active surveillance    Metabolic acidosis, increased anion gap-resolved as of 5/6/2023  Assessment & Plan  - On arrival 7 206/18 5/86 7/7 2 (VBG)  - Anion gap of 17   - Serum bicarb of 11  - Suspect 2/2 to renal failure due to infection   S/p - Sodium bicarb drip and IVF  mL/hr     Plan  Monitor BMP    Medical Problems     Resolved Problems  Date Reviewed: 5/8/2023          Resolved    Hyponatremia 5/6/2023     Resolved by  Anoop Peters MD    Metabolic acidosis, increased anion gap 5/6/2023     Resolved by  Anoop Peters MD        Discharging Resident: Yecenia Cartagena MD  Discharging Attending: Evie Farmer MD  PCP: Adwoa Yeager DO  Admission Date:   Admission Orders (From admission, onward)     Ordered        05/04/23 2222  INPATIENT ADMISSION  Once                      Discharge Date: 05/13/23    Consultations During Hospital Stay:  · Urology, nephrology    Procedures Performed:   · Ureteral stent placement and exchange    Significant Findings / Test Results:   FL retrograde pyelogram   Final Result by Ness Ruiz DO (05/11 0715)      Fluoroscopic guidance provided for bilateral retrograde pyelogram with ureteral stent placement as above  Please see procedure report for further details  Workstation performed: [id number]         US kidney and bladder   Final Result by Benjamin Jones DO (05/08 112)   1  Redemonstration of bilateral hydronephrosis which appears slightly worsened as compared to ultrasound of 5/4/2023  There is echogenic debris within the bilateral collecting systems as well as the bladder which may represent cellular debris versus    blood products  2  Stable positioning of right ureteral stent  3  Nonobstructing left renal calculus measuring 5 mm   4   Redemonstration of circumferential urinary bladder wall thickening compatible with chronic urinary retention versus cystitis  There is a tiny focus of air within suggested within the bladder as can be seen in the setting of cystitis versus recent    catheterization/instrumentation  Correlation with urinalysis is recommended  The study was marked in [facility] for immediate notification  Workstation performed: [id number]         VAS lower limb venous duplex study, complete bilateral   Final Result by Keiko Fishman MD (05/05 1605)      CT abdomen pelvis wo contrast   Final Result by Walter Soares MD (05/04 2143)         1  Right ureteral stent in expected position  Resolution of subcapsular collection  No obstructive uropathy  2  Mild left hydronephrosis and hydroureter  No obstructing calculus  Pyelonephritis not excluded  3  Persistent circumferential bladder wall thickening could indicate cystitis  4  Severe constipation  Workstation performed: [id number]         US kidney and bladder with pvr   Final Result by Augustine Vu MD (05/04 1952)      The nephroureteral stent is in place  Mild bilateral hydronephrosis more prominent on the left  Chronic circumferential bladder wall thickening  Workstation performed: [id number]         US kidney and bladder    (Results Pending)   ·    ·     Incidental Findings:   See above        Test Results Pending at Discharge (will require follow up): · None     Outpatient Tests Requested:  · CBC and BMP, follow-up with PCP, urology, nephrology, hematology oncology    Complications: None    Reason for Admission: Hemorrhagic cystitis    Hospital Course: Rupert Puckett is a 74 y o  female patient with a past medical history of endometrial cancer s/p chemotherapy currently in remission, DVT, CKD stage III, hypertension, anemia, who originally presented to the hospital on 5/4/2023 due to progressively worsening renal function    Patient was found to have hemorrhagic cystitis with "pyelonephritis, initially treated with cefepime for total of 8 days, urine culture returned which grew Staph aureus and patient was ultimately changed to Ancef  During hospitalization urology was consulted who changed the right ureteral stent and inserted a left ureteral stent  Upon admission patient's kidney function was close to 4 however has been progressively improving throughout hospitalization  Nephrology was also consulted who recommended biopsy as an outpatient and to follow-up with nephrology  Physical therapy also evaluated the patient and recommended home with home health  Patient was discharged on Cephalexin to complete a total of 14 days of antibiotics  She will have to repeat BMP prior to her next appointment with urology, as well as a renal ultrasound one week from discharge  She will follow up with her PCP within a week of discharge, as well as urology and nephrology on outpatient basis  Patient verbalized understanding and is in agreement with this plan  Please see above list of diagnoses and related plan for additional information  Condition at Discharge: fair    Discharge Day Visit / Exam:   Subjective:  Patient found laying in bed, not in acute distress at the time of my evaluation  She had no complaints today and is eager to go home  Denies dizziness, lightheadedness, chest pain, SoB, abdominal tenderness or distensions, has been urinating without issues  Vitals: Blood Pressure: 107/69 (05/13/23 0620)  Pulse: 72 (05/13/23 0620)  Temperature: 97 7 °F (36 5 °C) (05/13/23 0620)  Temp Source: Temporal (05/09/23 1211)  Respirations: 18 (05/13/23 0620)  Height: 4' 11\" (149 9 cm) (last ht assessment) (05/05/23 1325)  SpO2: 98 % (05/13/23 5377)  Exam:   Physical Exam  Vitals and nursing note reviewed  Constitutional:       General: She is not in acute distress  Appearance: Normal appearance  She is ill-appearing (chronically)  She is not toxic-appearing or diaphoretic     HENT:      " Head: Normocephalic and atraumatic  Nose: Nose normal       Mouth/Throat:      Mouth: Mucous membranes are moist       Pharynx: Oropharynx is clear  Eyes:      General: No scleral icterus  Right eye: No discharge  Left eye: No discharge  Extraocular Movements: Extraocular movements intact  Conjunctiva/sclera: Conjunctivae normal    Cardiovascular:      Rate and Rhythm: Normal rate and regular rhythm  Pulses: Normal pulses  Heart sounds: Normal heart sounds  No murmur heard  Pulmonary:      Effort: Pulmonary effort is normal  No respiratory distress  Breath sounds: Normal breath sounds  No wheezing, rhonchi or rales  Abdominal:      General: Abdomen is flat  Bowel sounds are normal  There is no distension  Palpations: Abdomen is soft  Tenderness: There is no abdominal tenderness  There is no guarding or rebound  Musculoskeletal:      Cervical back: Normal range of motion and neck supple  Right lower leg: No edema  Left lower leg: No edema  Skin:     General: Skin is warm and dry  Coloration: Skin is not jaundiced or pale  Neurological:      Mental Status: She is alert and oriented to person, place, and time  Mental status is at baseline  Psychiatric:         Mood and Affect: Mood normal          Behavior: Behavior normal          Thought Content: Thought content normal          Judgment: Judgment normal           Discussion with Family: Updated  (sister) via phone  Discharge instructions/Information to patient and family:   See after visit summary for information provided to patient and family  Provisions for Follow-Up Care:  See after visit summary for information related to follow-up care and any pertinent home health orders         Disposition:   Home with [facility] Services (Reminder: Complete face to face encounter)    Planned Readmission: None    Discharge Medications:  See after visit summary for reconciled discharge medications provided to patient and/or family        **Please Note: This note may have been constructed using a voice recognition system**

## 2023-05-13 ENCOUNTER — HOME HEALTH ADMISSION (OUTPATIENT)
Dept: HOME HEALTH SERVICES | Facility: HOME HEALTHCARE | Age: 68
End: 2023-05-13

## 2023-05-13 VITALS
DIASTOLIC BLOOD PRESSURE: 69 MMHG | RESPIRATION RATE: 18 BRPM | SYSTOLIC BLOOD PRESSURE: 107 MMHG | TEMPERATURE: 97.7 F | HEIGHT: 59 IN | OXYGEN SATURATION: 98 % | HEART RATE: 72 BPM | BODY MASS INDEX: 23.43 KG/M2

## 2023-05-13 LAB
ANION GAP SERPL CALCULATED.3IONS-SCNC: 10 MMOL/L (ref 4–13)
BUN SERPL-MCNC: 39 MG/DL (ref 5–25)
CALCIUM SERPL-MCNC: 8.9 MG/DL (ref 8.4–10.2)
CHLORIDE SERPL-SCNC: 101 MMOL/L (ref 96–108)
CO2 SERPL-SCNC: 23 MMOL/L (ref 21–32)
CREAT SERPL-MCNC: 2.47 MG/DL (ref 0.6–1.3)
CREAT UR-MCNC: 21.3 MG/DL
ERYTHROCYTE [DISTWIDTH] IN BLOOD BY AUTOMATED COUNT: 13.6 % (ref 11.6–15.1)
GFR SERPL CREATININE-BSD FRML MDRD: 19 ML/MIN/1.73SQ M
GLUCOSE SERPL-MCNC: 88 MG/DL (ref 65–140)
HCT VFR BLD AUTO: 29.2 % (ref 34.8–46.1)
HGB BLD-MCNC: 8.5 G/DL (ref 11.5–15.4)
MCH RBC QN AUTO: 28.7 PG (ref 26.8–34.3)
MCHC RBC AUTO-ENTMCNC: 29.1 G/DL (ref 31.4–37.4)
MCV RBC AUTO: 99 FL (ref 82–98)
PLATELET # BLD AUTO: 270 THOUSANDS/UL (ref 149–390)
PMV BLD AUTO: 9.8 FL (ref 8.9–12.7)
POTASSIUM SERPL-SCNC: 4 MMOL/L (ref 3.5–5.3)
PROT UR-MCNC: 151 MG/DL
PROT/CREAT UR: 7.09 MG/G{CREAT} (ref 0–0.1)
RBC # BLD AUTO: 2.96 MILLION/UL (ref 3.81–5.12)
SODIUM SERPL-SCNC: 134 MMOL/L (ref 135–147)
WBC # BLD AUTO: 12.75 THOUSAND/UL (ref 4.31–10.16)

## 2023-05-13 RX ORDER — CEPHALEXIN 500 MG/1
500 CAPSULE ORAL EVERY 6 HOURS SCHEDULED
Qty: 16 CAPSULE | Refills: 0 | Status: SHIPPED | OUTPATIENT
Start: 2023-05-13 | End: 2023-05-13 | Stop reason: SDUPTHER

## 2023-05-13 RX ORDER — CEPHALEXIN 250 MG/1
250 CAPSULE ORAL EVERY 8 HOURS SCHEDULED
Qty: 12 CAPSULE | Refills: 0 | Status: SHIPPED | OUTPATIENT
Start: 2023-05-13 | End: 2023-05-17

## 2023-05-13 RX ADMIN — CEFAZOLIN SODIUM 1000 MG: 1 SOLUTION INTRAVENOUS at 08:05

## 2023-05-13 RX ADMIN — Medication 250 MG: at 08:01

## 2023-05-13 RX ADMIN — FOLIC ACID 1 MG: 1 TABLET ORAL at 08:01

## 2023-05-13 RX ADMIN — CALCIUM ACETATE 667 MG: 667 CAPSULE ORAL at 12:01

## 2023-05-13 RX ADMIN — ARIPIPRAZOLE 20 MG: 5 TABLET ORAL at 08:01

## 2023-05-13 RX ADMIN — POLYETHYLENE GLYCOL 3350 17 G: 17 POWDER, FOR SOLUTION ORAL at 08:01

## 2023-05-13 RX ADMIN — VENLAFAXINE 75 MG: 37.5 TABLET ORAL at 08:01

## 2023-05-13 RX ADMIN — CALCIUM ACETATE 667 MG: 667 CAPSULE ORAL at 08:01

## 2023-05-13 RX ADMIN — PANTOPRAZOLE SODIUM 40 MG: 40 TABLET, DELAYED RELEASE ORAL at 08:05

## 2023-05-13 RX ADMIN — DOCUSATE SODIUM 100 MG: 100 CAPSULE, LIQUID FILLED ORAL at 08:01

## 2023-05-13 NOTE — CASE MANAGEMENT
Case Management Discharge Planning Note    Patient name Zeke Fowler  Location S /S -01 MRN [id number]  : 1955 Date 2023       Current Admission Date: 2023  Current Admission Diagnosis:Hemorrhagic cystitis w/ pyelonephritis   Patient Active Problem List    Diagnosis Date Noted   • Chronic kidney disease    • Acute kidney injury [facility])    • Other proteinuria    • History of DVT (deep vein thrombosis) 2023   • Hemorrhagic cystitis w/ pyelonephritis 2023   • Hyperphosphatemia 2023   • Abnormal iron saturation 2023   • Iron deficiency anemia secondary to inadequate dietary iron intake 2023   • Dry mouth 2023   • Chronic kidney disease, stage 3a ([address] ) 2023   • Nephrotoxicity 10/03/2022   • Mucositis 10/03/2022   • Vaginal atrophy 2022   • Vulvar irritation 2022   • Drug-induced anemia 2022   • Burning with urination 2022   • Continuous opioid dependence ([address] ) 2022   • Depression, recurrent ([address] ) 2022   • Cachexia ([address] ) 10/06/2021   • Chronic UTI 2021   • Secondary malignant neoplasm of other specified sites ([address] ) 2021   • Closed nondisplaced fracture of right pubis with delayed healing 2021   • Exertional dyspnea 2021   • Need for follow-up by  2021   • Dental disorder 2021   • Constipation 2021   • Osteoporosis 2020   • Pathological fracture due to osteoporosis 2020   • Bilateral piriformis syndrome    • Chronic pain syndrome    • Myofascial pain syndrome    • Nephrostomy status ([address] ) 10/14/2020   • Overactive bladder 2020   • Acute left-sided low back pain without sciatica 2020   • Obstruction of right ureter 2020   • Hypomagnesemia 10/18/2019   • Ambulatory dysfunction 10/11/2019   • Severe protein-calorie malnutrition ([address] ) 10/03/2019   • Bilateral lower extremity edema 2019   • Generalized weakness 2019   • Acute on chronic kidney disease 09/19/2019   • Hypokalemia 09/19/2019   • Chemotherapy-induced nausea 09/18/2019   • Anemia 09/06/2019   • Chemotherapy induced neutropenia ([facility] ) 08/30/2019   • Cancer related pain 07/19/2019   • Encounter for central line care 07/16/2019   • Acute deep vein thrombosis (DVT) of proximal vein of lower extremity ([facility] ) 06/19/2019   • Breast cancer screening 06/19/2019   • Encounter for follow-up surveillance of endometrial cancer 03/17/2019   • Benign essential hypertension 12/19/2017   • Endometrial cancer ([facility] ) 11/29/2017   • Dyslipidemia 04/01/2014   • Major depression, chronic 10/07/2013      LOS (days): 9  Geometric Mean LOS (GMLOS) (days): 5 90  Days to GMLOS:-2 6     OBJECTIVE:  Risk of Unplanned Readmission Score: 36 03         Current admission status: Inpatient   Preferred Pharmacy:   [id number] [place] - [address] [facility]  [address] [place] [address]  Phone: [phone] Fax: [phone]    [id number]- [place] [address] [facility] [address]  [address] [address]  Phone: [phone] Fax: [phone] [address] ([place]) - [place], [place] - [facility]  [address] [address]  Phone: [phone] Fax: [phone] [facility], [address] [address]  Phone: [phone] Fax: [phone]    [facility] - [facility]  [address] [place] [address] [address]  Phone: [phone] Fax: [phone] - [place] [address] [facility] [address] [facility] [address] [facility]  Phone: [phone] Fax: [phone]    [facility] - [address]  [address] [facility] [address]  Phone: [phone] Fax: [phone]    Primary Care Provider: DO Sierra Dubose Insurance: [employer] [facility] REP  Secondary Insurance:     DISCHARGE DETAILS:    Discharge planning discussed with[de-identified] Patient  Freedom of Choice: Yes  Comments - Freedom of Choice: Reviewed recommendation for [place]  CM contacted family/caregiver?: No- see comments (Patient is alert and oriented and declined CM outreach at this time)  Were Treatment Team discharge recommendations reviewed with patient/caregiver?: Yes  Did patient/caregiver verbalize understanding of patient care needs?: Yes  Were patient/caregiver advised of the risks associated with not following Treatment Team discharge recommendations?: Yes    Contacts  Patient Contacts: Patient  Relationship to Patient[de-identified] Other (Comment)  Contact Method: In Person  Reason/Outcome: Discharge Planning, Referral, Continuity of [address] [place] [address]         Is the patient interested in [facility] at discharge?: Yes  [address] requested[de-identified] Nursing, Occupational Therapy, Physical [address] Name[de-identified] Other  [address] [place] [address] Provider[de-identified] PCP  Home Health Services Needed[de-identified] Evaluate Functional Status and Safety, Gait/ADL Training, Strengthening/Theraputic Exercises to Improve Function  Homebound Criteria Met[de-identified] Uses an Assist Device (i e  cane, walker, etc), Requires the Assistance of Another Person for Safe Ambulation or to Leave the Home  Supporting Clincal Findings[de-identified] Limited Endurance, Fatigues Easliy in [employer]    DME Referral Provided  Referral made for DME?: No    Other Referral/Resources/Interventions Provided:  Interventions: [facility]         [facility]  Discharge Destination Plan[de-identified] Home with García at Discharge : Family                             IMM Given (Date):: 05/13/23  IMM Given to[de-identified] Patient        IMM reviewed with patient  Patient agrees with discharge determination  CM met with patient at bedside and reviewed recommendation for [facility] at [place]  Patient is agreeable with a blanket referral being made at this time  [facility] would be her preference if available  Patient reported her sister will pick her up once her DC is ready  CM will continue to follow to assist with discharge coordination

## 2023-05-13 NOTE — PROGRESS NOTES
-- Patient:  -- MRN: [id number]  -- Aidin Request ID: [id number]  -- Level of care reserved: [address] [place] [address]  -- Partner Reserved: [facility], [place], [address] [place] [address] [phone]  -- Clinical needs requested: physical therapy, occupational therapy, skilled nursing  -- Geography searched: [phone]  -- Start of Service:  -- Request sent: 11:11am EDT on 5/13/2023 by Nikko Galaviz  -- Partner reserved: 1:57pm EDT on 5/13/2023 by Nikko Galaviz  -- Choice list shared: 1:55pm EDT on 5/13/2023 by Nikko Galaviz

## 2023-05-13 NOTE — ASSESSMENT & PLAN NOTE
Lab Results   Component Value Date    EGFR 19 05/13/2023    EGFR 21 05/12/2023    EGFR 18 05/11/2023    CREATININE 2 47 (H) 05/13/2023    CREATININE 2 32 (H) 05/12/2023    CREATININE 2 65 (H) 05/11/2023

## 2023-05-13 NOTE — PROGRESS NOTES
[address] [facility] NOTE   Ragini Melendez 74 y o  female MRN: [id number]  Unit/Bed#: S -01 Encounter: [id number]  Reason for Consult: MONROE, CKD    ASSESSMENT and PLAN:  1  Acute kidney injury (POA):  · Admission creatinine 4 26 on May 4, 2023  · Etiology is felt to be due to hydronephrosis  · Renal function improving  SCr stable today - 2 47      2  Chronic kidney disease, stage III:  · Baseline creatinine around 1 1-1 4   · However was 1 60 in March 2023  · Follows with Dr Angelina Gutiérrez in the office  3  Nephrotic range proteinuria  · Worked up by Dr Angelina Gutiérrez  · Concern for Avastin as the etiology  · Follow up with Dr Angelina Gutiérrez on discharge  4  Bilateral hydronephrosis  · S/p left ureteral stent placement, right ureteral stent exchange on 5/9/23  · Follow up with urology  · Needs repeat renal US 1 week after discharge  5  Hypertension  · BP is controlled  · Not on meds  6  Anemia  · Hgb stable  7  Mineral and bone disease:  · Continue PhosLo with meals  DISPOSITION:  · Stable for discharge from renal standpoint  · Check BMP 1 week after discharge  · Will need repeat renal ultrasound and urology follow-up on discharge  · Follow-up with Dr Angelina Gutiérrez on discharge  The highlighted and/or bolded points in my assessment, plan, and disposition were discussed with the primary team and they agree with those points and the plan  SUBJECTIVE / 24H INTERVAL HISTORY:  No CP or SOB  Appetite is poor  OBJECTIVE:  Current Weight: Weight - Scale:  (no weight for current admission   please weigh as able)  Vitals:    05/12/23 0712 05/12/23 1640 05/12/23 2205 05/13/23 0620   BP: 107/62 113/73 111/75 107/69   Pulse: 81  84 72   Resp: 18  18 18   Temp: 98 1 °F (36 7 °C) 98 3 °F (36 8 °C) 98 °F (36 7 °C) 97 7 °F (36 5 °C)   TempSrc:       SpO2: 96%  97% 98%   Height:           Intake/Output Summary (Last 24 hours) at 5/13/2023 1134  Last data filed at 5/13/2023 0900  Gross per 24 hour   Intake --   Output 3600 ml   Net -3600 ml     General: conscious, cooperative, no distress  Skin: dry  Eyes: pink conjunctivae  ENT: moist mucous membranes  Respiratory: equal chest expansion, clear breath sounds  Cardiovascular: distinct heart sounds, normal rate, regular rhythm, no rub  Abdomen: soft, non tender, non distended, normal bowel sounds  Extremities: no edema  Genitourinary: no manley catheter  Neuro: awake, alert     Psych: appropriate affect    Medications:    Current Facility-Administered Medications:   •  acetaminophen (TYLENOL) tablet 650 mg, 650 mg, Oral, Q4H PRN, Shy Martinez MD  •  ARIPiprazole (ABILIFY) tablet 20 mg, 20 mg, Oral, Daily, Shy Martinez MD, 20 mg at 05/13/23 0801  •  bisacodyl (DULCOLAX) rectal suppository 10 mg, 10 mg, Rectal, Daily, Shy Martinez MD  •  calcium acetate (PHOSLO) capsule 667 mg, 667 mg, Oral, TID With Meals, Shy Martinez MD, 667 mg at 05/13/23 0801  •  ceFAZolin (ANCEF) IVPB (premix in dextrose) 1,000 mg 50 mL, 1,000 mg, Intravenous, Q8H, hSy Martinez MD, Last Rate: 100 mL/hr at 05/13/23 0805, 1,000 mg at 05/13/23 0805  •  docusate sodium (COLACE) capsule 100 mg, 100 mg, Oral, BID, Shy Martinez MD, 100 mg at 67/84/35 6206  •  folic acid (FOLVITE) tablet 1 mg, 1 mg, Oral, Daily, Shy Martinez MD, 1 mg at 05/13/23 0801  •  HYDROmorphone HCl (DILAUDID) injection 0 2 mg, 0 2 mg, Intravenous, Q4H PRN, Bishop Gupta MD, 0 2 mg at 05/10/23 0033  •  ondansetron (ZOFRAN) injection 4 mg, 4 mg, Intravenous, Q4H PRN, Abdirizak Alaniz MD  •  oxybutynin (DITROPAN-XL) 24 hr tablet 15 mg, 15 mg, Oral, HS, Shy Martinez MD, 15 mg at 05/12/23 2145  •  oxyCODONE (ROXICODONE) IR tablet 5 mg, 5 mg, Oral, Q4H PRN, Shy Martinez MD, 5 mg at 05/09/23 2037  •  oxyCODONE (ROXICODONE) split tablet 2 5 mg, 2 5 mg, Oral, Q4H PRN, Shy Martinez MD  •  pantoprazole (PROTONIX) EC tablet 40 mg, 40 mg, Oral, Early Morning, Shy Martinez MD, 40 mg at 05/13/23 0805  •  polyethylene glycol (MIRALAX) packet 17 g, 17 g, Oral, Daily, Cynthia Jackson MD, 17 g at 05/13/23 0801  •  saccharomyces boulardii (FLORASTOR) capsule 250 mg, 250 mg, Oral, BID, Cynthia Jackson MD, 250 mg at 05/13/23 0801  •  venlafaxine [facility]) tablet 75 mg, 75 mg, Oral, TID, Cynthia Jackson MD, 75 mg at 05/13/23 0801    Laboratory Results:  Results from last 7 days   Lab Units 05/13/23  0456 05/12/23  0526 05/11/23  0506 05/10/23  0645 05/10/23  0500 05/09/23  0525 05/08/23  0515 05/07/23  0530   WBC Thousand/uL 12 75* 11 99* 17 63*  --  22 44* 13 19* 12 01* 11 94*   HEMOGLOBIN g/dL 8 5* 8 3* 8 2*  --  8 4* 7 8* 7 4* 7 7*   HEMATOCRIT % 29 2* 29 6* 27 4*  --  29 4* 26 0* 24 5* 25 6*   PLATELETS Thousands/uL 270 240 266  --  242 253 292 293   POTASSIUM mmol/L 4 0 3 8 4 4 5 3  --  4 2 4 9 4 1   CHLORIDE mmol/L 101 99 101 104  --  105 106 106   CO2 mmol/L 23 24 21 22  --  20* 21 24   BUN mg/dL 39* 42* 45* 47*  --  52* 53* 58*   CREATININE mg/dL 2 47* 2 32* 2 65* 2 76*  --  3 01* 3 20* 3 15*   CALCIUM mg/dL 8 9 8 9 8 8 8 7  --  9 0 8 6 8 6   PHOSPHORUS mg/dL  --   --   --   --   --   --  5 1*  --

## 2023-05-13 NOTE — CASE MANAGEMENT
Case Management Discharge Planning Note    Patient name Flakito Geronimo  Location S /S -01 MRN [id number]  : 1955 Date 2023       Current Admission Date: 2023  Current Admission Diagnosis:Hemorrhagic cystitis w/ pyelonephritis   Patient Active Problem List    Diagnosis Date Noted   • Chronic kidney disease    • Acute kidney injury [facility])    • Other proteinuria    • History of DVT (deep vein thrombosis) 2023   • Hemorrhagic cystitis w/ pyelonephritis 2023   • Hyperphosphatemia 2023   • Abnormal iron saturation 2023   • Iron deficiency anemia secondary to inadequate dietary iron intake 2023   • Dry mouth 2023   • Chronic kidney disease, stage 3a ([address] ) 2023   • Nephrotoxicity 10/03/2022   • Mucositis 10/03/2022   • Vaginal atrophy 2022   • Vulvar irritation 2022   • Drug-induced anemia 2022   • Burning with urination 2022   • Continuous opioid dependence ([address] ) 2022   • Depression, recurrent ([address] ) 2022   • Cachexia ([address] ) 10/06/2021   • Chronic UTI 2021   • Secondary malignant neoplasm of other specified sites ([address] ) 2021   • Closed nondisplaced fracture of right pubis with delayed healing 2021   • Exertional dyspnea 2021   • Need for follow-up by  2021   • Dental disorder 2021   • Constipation 2021   • Osteoporosis 2020   • Pathological fracture due to osteoporosis 2020   • Bilateral piriformis syndrome    • Chronic pain syndrome    • Myofascial pain syndrome    • Nephrostomy status ([address] ) 10/14/2020   • Overactive bladder 2020   • Acute left-sided low back pain without sciatica 2020   • Obstruction of right ureter 2020   • Hypomagnesemia 10/18/2019   • Ambulatory dysfunction 10/11/2019   • Severe protein-calorie malnutrition ([address] ) 10/03/2019   • Bilateral lower extremity edema 2019   • Generalized weakness 2019   • Acute on chronic kidney disease 09/19/2019   • Hypokalemia 09/19/2019   • Chemotherapy-induced nausea 09/18/2019   • Anemia 09/06/2019   • Chemotherapy induced neutropenia ([facility] ) 08/30/2019   • Cancer related pain 07/19/2019   • Encounter for central line care 07/16/2019   • Acute deep vein thrombosis (DVT) of proximal vein of lower extremity ([facility] ) 06/19/2019   • Breast cancer screening 06/19/2019   • Encounter for follow-up surveillance of endometrial cancer 03/17/2019   • Benign essential hypertension 12/19/2017   • Endometrial cancer ([facility] ) 11/29/2017   • Dyslipidemia 04/01/2014   • Major depression, chronic 10/07/2013      LOS (days): 9  Geometric Mean LOS (GMLOS) (days): 5 90  Days to GMLOS:-2 7     OBJECTIVE:  Risk of Unplanned Readmission Score: 36 1         Current admission status: Inpatient   Preferred Pharmacy:   [id number] [place] [id number] [facility]  [address] [facility] [address]  Phone: [phone] Fax: [phone]    [id number] [address] [facility], [address] [facility] [address]  [address] [place] [address]  Phone: [phone] Fax: [phone] [address] ([place]) - [place], [place] - \[facility]\"" 63  [address] [place] [address]  Phone: [phone] Fax: [phone] - [facility] [place], [address] [place] [address] Drive [address]  Phone: [phone] Fax: [phone]    [facility] - [facility]  [address] [place] [address] [address]  Phone: [phone] Fax: [phone] - [place] [address] [facility]  Phone: [phone] Fax: [phone]    [facility] - [place] [place] [address] Rd  [address] [place] [id number]  Phone: [phone] Fax: [phone]    Primary Care Provider: Arlette Huff,     Primary Insurance: Ayse Marquez [address] REP  Secondary Insurance:     DISCHARGE DETAILS:                                Requested [address]         Is the patient interested in [address] at discharge?: Yes  [address] requested[de-identified] Nursing, Occupational Therapy, Physical [address] Name[de-identified] [address] [facility] Provider[de-identified] PCP  Home Health Services Needed[de-identified] Evaluate Functional Status and Safety, Strengthening/Theraputic Exercises to Improve Function, Gait/ADL Training  Homebound Criteria Met[de-identified] Requires the Assistance of Another Person for Safe Ambulation or to Leave the Home, Uses an Assist Device (i e  cane, walker, etc)  Supporting Clincal Findings[de-identified] Limited Endurance, Fatigues Easliy in [employer]         Other Referral/Resources/Interventions Provided:  Interventions: [address]       Patient was accepted by [facility] for [address] at DC  Their contact information was added to AVS     No further CM DC needs were discussed or identified  CM encouraged patient to follow up with all recommended appointments after discharge  Patient advised of importance for patient and family to participate in managing patient's medical well being

## 2023-05-15 ENCOUNTER — TELEPHONE (OUTPATIENT)
Dept: HEMATOLOGY ONCOLOGY | Facility: CLINIC | Age: 68
End: 2023-05-15

## 2023-05-15 ENCOUNTER — TELEPHONE (OUTPATIENT)
Dept: NEPHROLOGY | Facility: CLINIC | Age: 68
End: 2023-05-15

## 2023-05-15 ENCOUNTER — HOME CARE VISIT (OUTPATIENT)
Dept: HOME HEALTH SERVICES | Facility: HOME HEALTHCARE | Age: 68
End: 2023-05-15

## 2023-05-15 ENCOUNTER — TRANSITIONAL CARE MANAGEMENT (OUTPATIENT)
Dept: FAMILY MEDICINE CLINIC | Facility: CLINIC | Age: 68
End: 2023-05-15

## 2023-05-15 ENCOUNTER — APPOINTMENT (OUTPATIENT)
Dept: PHYSICAL THERAPY | Facility: REHABILITATION | Age: 68
End: 2023-05-15
Payer: COMMERCIAL

## 2023-05-15 DIAGNOSIS — N17.9 AKI (ACUTE KIDNEY INJURY) (HCC): Primary | ICD-10-CM

## 2023-05-15 DIAGNOSIS — Z71.89 COMPLEX CARE COORDINATION: Primary | ICD-10-CM

## 2023-05-15 LAB
ALBUMIN UR ELPH-MCNC: 22.3 %
ALPHA1 GLOB MFR UR ELPH: 11 %
ALPHA2 GLOB MFR UR ELPH: 23.5 %
B-GLOBULIN MFR UR ELPH: 20.9 %
BACTERIA BLD CULT: NORMAL
BACTERIA BLD CULT: NORMAL
GAMMA GLOB MFR UR ELPH: 22.3 %
INTERPRETATION UR IFE-IMP: NORMAL
PROT PATTERN UR ELPH-IMP: ABNORMAL
PROT UR-MCNC: 142 MG/DL

## 2023-05-15 NOTE — TELEPHONE ENCOUNTER
Patient Call    Who are you speaking with? Patient    If it is not the patient, are they listed on an active communication consent form? N/A   What is the reason for this call? Patient calling in requesting to speak with Magi Singletary regarding her Lovenox  Does this require a call back? Yes   If a call back is required, please list [facility] call back number [phone]   If a call back is required, advise that a message will be forwarded to their care team and someone will return their call as soon as possible  Did you relay this information to the patient?  Yes

## 2023-05-15 NOTE — TELEPHONE ENCOUNTER
Return call placed  Patient notes recent hospital admission and was managed with heparin  Patient ok to restart lovenox at current dose, 80 mg/day

## 2023-05-15 NOTE — TELEPHONE ENCOUNTER
----- Message from Tery Schlatter, MD sent at 5/15/2023 11:18 AM EDT -----  This patient was seen at [facility] for [place] on CKD  Please arrange for follow up in the office in 2-4 weeks  Labs: CBC and BMP to be done 1 week after discharge

## 2023-05-15 NOTE — UTILIZATION REVIEW
NOTIFICATION OF ADMISSION DISCHARGE   This is a Notification of Discharge from [address]  Please be advised that this patient has been discharge from our facility  Below you will find the admission and discharge date and time including the patient’s disposition  UTILIZATION REVIEW CONTACT:  Drea Swanson MA  Utilization   Network Utilization Review Department  Phone: [phone] x carefully listen to the prompts  All voicemails are confidential   Email: [email or web address]     ADMISSION INFORMATION  PRESENTATION DATE: 5/4/2023  5:26 PM  OBERVATION ADMISSION DATE:   INPATIENT ADMISSION DATE: 5/4/23 10:22 PM   DISCHARGE DATE: 5/13/2023  1:54 PM   DISPOSITION:Home with Home Health Care    IMPORTANT INFORMATION:  Send all requests for admission clinical reviews, approved or denied determinations and any other requests to dedicated fax number below belonging to the campus where the patient is receiving treatment   List of dedicated fax numbers:  [address] DENIALS (Administrative/Medical Necessity) [phone]   [address] (Maternity/NICU/Pediatrics) [phone]   [facility] [phone]   [place] [address] [phone]   [address] [phone]   [address] [facility] [phone]   [address] [place] [address] [phone]   [address] [place], [place] [address] [phone]   [facility]  [phone] [address] [facility] [phone]   [address] [facility] [address] [facility] [phone]

## 2023-05-16 ENCOUNTER — RA CDI HCC (OUTPATIENT)
Dept: OTHER | Facility: HOSPITAL | Age: 68
End: 2023-05-16

## 2023-05-16 ENCOUNTER — PATIENT OUTREACH (OUTPATIENT)
Dept: FAMILY MEDICINE CLINIC | Facility: CLINIC | Age: 68
End: 2023-05-16

## 2023-05-16 ENCOUNTER — OFFICE VISIT (OUTPATIENT)
Dept: UROLOGY | Facility: AMBULATORY SURGERY CENTER | Age: 68
End: 2023-05-16

## 2023-05-16 VITALS
HEART RATE: 66 BPM | WEIGHT: 108 LBS | DIASTOLIC BLOOD PRESSURE: 70 MMHG | BODY MASS INDEX: 21.77 KG/M2 | HEIGHT: 59 IN | SYSTOLIC BLOOD PRESSURE: 110 MMHG

## 2023-05-16 DIAGNOSIS — N13.39 OTHER HYDRONEPHROSIS: Primary | ICD-10-CM

## 2023-05-16 NOTE — PROGRESS NOTES
5/16/2023      Chief Complaint   Patient presents with   • Urinary Tract Infection     Assessment and Plan    74 y o  female managed by Dr Kevin Becker    1  Hydronephrosis secondary to right ureteral obstruction  ? Secondary to endometrial cancer  ? Status post insertion left ureteral stent placement and right ureteral stent exchange 5/9/2023  ? Next exchange due 08/2023  ? Nephrostomy tube site unremarkable  ? Previously failed capping trial and presented to IR 6/9/2022 for re-conversion of PCN to PCNU  Nephrostogram shows the following:  - Occlusion to mid right ureter  ? Occlusion crossed with guidewire and catheter 10 F placed   ? Follow up in the office in 3 months for H&P for ureteral stent exchanges  ? OR case requested  ? Case discussed with Surgical Coordinator who will assist in scheduling      2  Atrophic vaginitis  ? Continue with recommendations from GYN     3  Overactive bladder symptoms  ? Continue oxybutynin and Gemtesa  ? Maintain adequate hydration upwards to 40 oz of water intake per day while avoiding bladder irritating foods beverages  ? Ensure complete bladder emptying with urination  ? No caffeinated beverages after 3:00 p m        History of Present Illness  Ragini Melendez is a 74 y o  female here for follow up evaluation of  hydronephrosis secondary to endometrial cancer   She is undergoing chemotherapy and clinical trials for advancement of disease   She has a nephrostomy tube most recently exchanged by intervention Radiology 99/06/0308   HQY denies complications with percutaneous nephrostomy tube  Most recently, 5/4/2023, patient presented to the emergency department at the direction of nephrology for acute kidney injury and metabolic acidosis, cystitis  CT abdomen pelvis was performed and she was noted to have mild left-sided hydronephrosis and persistent circumferential bladder wall thickening    She was taken to the operating room for insertion of left ureteral stent and right ureteral stent exchange by Dr Rc Willson patient  She presents to the office today for discussion and reevaluation and scheduling of bilateral stent exchange in 3 months            Review of Systems   Constitutional: Positive for fatigue  Negative for chills and fever  Respiratory: Negative for cough and shortness of breath  Cardiovascular: Negative for chest pain  Gastrointestinal: Negative for abdominal distention, abdominal pain, blood in stool, nausea and vomiting  Genitourinary: Negative for difficulty urinating, dysuria, enuresis, flank pain, frequency, hematuria and urgency  Skin: Negative for rash       Past Medical History  Past Medical History:   Diagnosis Date   • Anxiety    • CKD (chronic kidney disease) stage 3, GFR 30-59 ml/min ([facility] )    • COVID     Fall 2022   • Depression    • Endometrial cancer ([facility] ) 12/2017   • GERD (gastroesophageal reflux disease)    • H/O gastric bypass    • Hard to intubate     pt denies   • History of chemotherapy     started 12/2021endometrial cancer- done 12/23/22   • History of DVT in adulthood     RLE   • Hyperglycemia     vx type 2 dm -- last assessed 4/1/14; resolved 11/7/17   • Hyperlipidemia    • Hypertension     in past- no meds at present   • Iron deficiency anemia     iron infusions weekly   • OAB (overactive bladder)    • Port-A-Cath in place    • Wears glasses        Past Social History  Past Surgical History:   Procedure Laterality Date   • ABDOMINAL SURGERY      GASTRIC BYPASS; 2001   • BREAST BIOPSY Right 06/28/2019    core biopsy; benign   • CHOLECYSTECTOMY      at the time of gastric bypass   • COLONOSCOPY     • CT NEEDLE BIOPSY LYMPH NODE  07/08/2019   • FL GUIDED CENTRAL VENOUS ACCESS DEVICE INSERTION  11/12/2019   • FL RETROGRADE PYELOGRAM  3/30/2023   • FL RETROGRADE PYELOGRAM  5/9/2023   • GASTRIC BYPASS     • HYSTERECTOMY Bilateral 2017    total abdominal with salpingo-oophorectomy   • IR NEPHROSTOMY TO NEPHROURETERAL STENT  06/09/2022   • IR NEPHROSTOMY TO NEPHROURETERAL STENT  12/20/2022   • IR NEPHROSTOMY TUBE CHECK/CHANGE/REPOSITION/REINSERTION/UPSIZE  05/27/2022   • IR NEPHROSTOMY TUBE PLACEMENT  07/26/2019   • IR NEPHROURETERAL STENT CHECK/CHANGE/REPOSITION  04/06/2021   • IR NEPHROURETERAL STENT CHECK/CHANGE/REPOSITION  06/04/2021   • IR NEPHROURETERAL STENT CHECK/CHANGE/REPOSITION  09/03/2021   • IR NEPHROURETERAL STENT CHECK/CHANGE/REPOSITION  12/07/2021   • IR NEPHROURETERAL STENT CHECK/CHANGE/REPOSITION  03/08/2022   • IR NEPHROURETERAL STENT CHECK/CHANGE/REPOSITION  05/10/2022   • IR NEPHROURETERAL STENT CHECK/CHANGE/REPOSITION  09/19/2022   • IR PICC LINE  09/27/2019   • IR PORT PLACEMENT  07/26/2019   • IR PORT REMOVAL  09/20/2019   • OOPHORECTOMY Bilateral 2017   • MI CYSTO BLADDER W/URETERAL CATHETERIZATION Right 3/30/2023    Procedure: CYSTOSCOPY RETROGRADE PYELOGRAM WITH exchange of STENT URETERAL;  Surgeon: Jeana Prado MD;  Location: [facility];  Service: Urology   • MI CYSTO BLADDER W/URETERAL CATHETERIZATION Bilateral 5/9/2023    Procedure: CYSTOSCOPY, BILATERAL RETROGRADE PYELOGRAM, WITH LEFT INSERTION STENT URETERAL, RIGHT URTERAL STENT EXCHANGE;  Surgeon: Ariel Banegas MD;  Location: [facility];  Service: Urology   • MI CYSTO W/INSERT URETERAL STENT Right 3/30/2023    Procedure: EXCHANGE STENT URETERAL;  Surgeon: Jeana Prado MD;  Location: [facility];  Service: Urology   • MI INSJ TUNNELED CTR VAD W/SUBQ PORT AGE 5 YR/> Left 11/12/2019    Procedure: INSERTION VENOUS PORT ( PORT-A-CATH) IR;  Surgeon: Michelle Skaggs DO;  Location[facility];  Service: Interventional Radiology   • MI LAPS ABD PRTM&OMENTUM DX W/WO SPEC BR/WA SPX N/A 12/19/2017    Procedure: LAPAROSCOPY DIAGNOSTIC;  Surgeon: Carla Moreno MD;  Location[facility];  Service: Gynecology Oncology   • MI LAPS W/RAD HYST W/BILAT LMPHADEC RMVL TUBE/OVARY N/A 12/19/2017    Procedure: HYSTERECTOMY LAPAROSCOPIC TOTAL ([address]) W/ ROBOTICS; BILATERAL SALPINGOOPHERECTOMY; LYMPH NODE DISSECTION; lysis of adhesions;  Surgeon: Neil Santos MD;  Location: [facility];  Service: Gynecology Oncology   • TONSILLECTOMY     • US GUIDED BREAST BIOPSY RIGHT COMPLETE Right 06/28/2019     Social History     Tobacco Use   Smoking Status Never   Smokeless Tobacco Never       Past Family History  Family History   Problem Relation Age of Onset   • Hyperlipidemia Mother    • Heart disease Mother    • Ovarian cancer Mother 48   • Colon cancer Mother    • Lymphoma Father    • Breast cancer Sister 61   • No Known Problems Brother    • No Known Problems Brother    • No Known Problems Maternal Grandmother    • Bone cancer Maternal Grandfather    • Uterine cancer Paternal Grandmother    • No Known Problems Paternal Grandfather    • No Known Problems Maternal Aunt    • No Known Problems Maternal Aunt    • No Known Problems Maternal Aunt    • No Known Problems Maternal Aunt    • No Known Problems Paternal Aunt    • No Known Problems Paternal Aunt    • No Known Problems Paternal Aunt    • No Known Problems Paternal Aunt        Past Social history  Social History     Socioeconomic History   • Marital status: Single     Spouse name: Not on file   • Number of children: Not on file   • Years of education: Not on file   • Highest education level: Not on file   Occupational History   • Not on file   Tobacco Use   • Smoking status: Never   • Smokeless tobacco: Never   Vaping Use   • Vaping Use: Never used   Substance and Sexual Activity   • Alcohol use: Not Currently   • Drug use: No   • Sexual activity: Not Currently   Other Topics Concern   • Not on file   Social History Narrative   • Not on file     Social Determinants of Health     Financial Resource Strain: Not on file   Food Insecurity: No Food Insecurity   • Worried About Running Out of Food in the Last Year: Never true   • Ran Out of Food in the Last Year: Never true   Transportation Needs: No Transportation Needs   • Lack of Transportation (Medical): No   • Lack of Transportation (Non-Medical): No   Physical Activity: Not on file   Stress: Not on file   Social Connections: Not on file   Intimate Partner Violence: Not on file   Housing Stability: Unknown   • Unable to Pay for Housing in the Last Year: No   • Number of Places Lived in the Last Year: Not on file   • Unstable Housing in the Last Year: No       Current Medications  Current Outpatient Medications   Medication Sig Dispense Refill   • ARIPiprazole (ABILIFY) 20 MG tablet Take 20 mg by mouth in the morning  • aspirin (ECOTRIN LOW STRENGTH) 81 mg EC tablet Take 81 mg by mouth every evening     • Calcium-Magnesium-Vitamin D (CALCIUM 500 PO) Take by mouth daily      • cephalexin (KEFLEX) 250 mg capsule Take 1 capsule (250 mg total) by mouth every 8 (eight) hours for 4 days 12 capsule 0   • cholecalciferol (VITAMIN D3) 1,000 units tablet Take 4 tablets (4,000 Units total) by mouth daily 90 tablet 0   • Cranberry-Vitamin C 250-60 MG CAPS Take 1 tablet by mouth in the morning 90 capsule 3   • Cyanocobalamin (VITAMIN B12 PO) Take by mouth daily      • dronabinol (MARINOL) 2 5 mg capsule Take 1 capsule (2 5 mg total) by mouth 2 (two) times a day before meals 30 capsule 0   • enoxaparin (LOVENOX) 80 mg/0 8 mL INJECT 0 8ML (80MG TOTAL)  SUBCUTANEOUSLY EVERY 24  HOURS 72 mL 1   • folic acid (FOLVITE) 1 mg tablet Take 1 tablet (1 mg total) by mouth daily  0   • Gemtesa 75 MG TABS TAKE 75 MG BY MOUTH IN THE MORNING 90 tablet 2   • Multiple Vitamin (MULTIVITAMIN) tablet Take 1 tablet by mouth daily     • naloxone (NARCAN) 4 mg/0 1 mL nasal spray Administer 1 spray into a nostril  If no response after 2-3 minutes, give another dose in the other nostril using a new spray   1 each 0   • omeprazole (PriLOSEC) 20 mg delayed release capsule Take 20 mg by mouth daily     • oxybutynin (DITROPAN XL) 15 MG 24 hr tablet Take 1 tablet (15 mg total) by mouth daily at bedtime 90 tablet 3   • "saccharomyces boulardii (FLORASTOR) 250 mg capsule Take 1 capsule (250 mg total) by mouth 2 (two) times a day  0   • senna (SENOKOT) 8 6 MG tablet Take 1 tablet (8 6 mg total) by mouth 2 (two) times a day as needed for constipation 60 tablet 0   • venlafaxine (EFFEXOR) 75 mg tablet Take 75 mg by mouth 3 (three) times a day         No current facility-administered medications for this visit  Allergies  Allergies   Allergen Reactions   • Cephalosporins Rash     Which Cephalosporin reaction was to not specified; however, has tolerated Amoxicillin, Cefazolin, and Cefepime         The following portions of the patient's history were reviewed and updated as appropriate: allergies, current medications, past medical history, past social history, past surgical history and problem list       Vitals  Vitals:    05/16/23 1556   BP: 110/70   BP Location: Left arm   Patient Position: Sitting   Cuff Size: Child   Pulse: 66   Weight: 49 kg (108 lb)   Height: 4' 11\" (1 499 m)           Physical Exam  Physical Exam  Vitals reviewed  Constitutional:       General: She is not in acute distress  Appearance: She is ill-appearing  Cardiovascular:      Rate and Rhythm: Normal rate and regular rhythm  Heart sounds: Normal heart sounds  Pulmonary:      Effort: Pulmonary effort is normal  No respiratory distress  Breath sounds: Normal breath sounds  Abdominal:      Tenderness: There is no right CVA tenderness or left CVA tenderness  Musculoskeletal:         General: Normal range of motion  Skin:     General: Skin is warm and dry  Neurological:      General: No focal deficit present  Mental Status: She is alert  Psychiatric:         Mood and Affect: Mood normal          Behavior: Behavior normal        Results  No results found for this or any previous visit (from the past 1 hour(s))  ]  No results found for: PSA  Lab Results   Component Value Date    GLUCOSE 219 (H) 12/19/2017    CALCIUM 8 9 05/13/2023 "  10/27/2017    K 4 0 05/13/2023    CO2 23 05/13/2023     05/13/2023    BUN 39 (H) 05/13/2023    CREATININE 2 47 (H) 05/13/2023     Lab Results   Component Value Date    WBC 12 75 (H) 05/13/2023    HGB 8 5 (L) 05/13/2023    HCT 29 2 (L) 05/13/2023    MCV 99 (H) 05/13/2023     05/13/2023     Orders  No orders of the defined types were placed in this encounter        Lonnye Anupama, CRNP

## 2023-05-16 NOTE — PROGRESS NOTES
Received referral via in basket message  Chart reviewed  Call to patient, introduced self and case management role  Pt declines outreach at this time  Pt did confirm that she is scheduled to see PCP tomorrow, has transportation and taking all medications as prescribed  Will close to complex care management at this time

## 2023-05-16 NOTE — PROGRESS NOTES
[facility]  coding opportunities       Chart reviewed, no opportunity found:   [address]        Patients Insurance     Medicare Insurance: [employer] Advantage

## 2023-05-17 ENCOUNTER — OFFICE VISIT (OUTPATIENT)
Dept: FAMILY MEDICINE CLINIC | Facility: CLINIC | Age: 68
End: 2023-05-17

## 2023-05-17 VITALS
BODY MASS INDEX: 22.38 KG/M2 | DIASTOLIC BLOOD PRESSURE: 64 MMHG | RESPIRATION RATE: 16 BRPM | SYSTOLIC BLOOD PRESSURE: 110 MMHG | HEIGHT: 59 IN | WEIGHT: 111 LBS

## 2023-05-17 DIAGNOSIS — Z76.89 ENCOUNTER FOR SUPPORT AND COORDINATION OF TRANSITION OF CARE: Primary | ICD-10-CM

## 2023-05-17 DIAGNOSIS — D50.9 IRON DEFICIENCY ANEMIA, UNSPECIFIED IRON DEFICIENCY ANEMIA TYPE: ICD-10-CM

## 2023-05-17 DIAGNOSIS — R53.83 OTHER FATIGUE: ICD-10-CM

## 2023-05-17 NOTE — PROGRESS NOTES
Assessment & Plan     1  Encounter for support and coordination of transition of care  - reviewed hospitalization records and notes from consultants  - reviewed recent labs   - completed abx today   - has US scheduled for 5/19/2023 and will likely get labs done that same day   - will add Fe panel, TSH and Vit D as pt with complaints of being cold and feeling fatigued  - of note, most recent CBC with Hb 8 5 (pt's last Fe infusion was 4/13/2023)   - has an appt with Nephro on 6/7/2023   - f/u after labs and in 1month for AWV - pt aware and agreeable     2  Other fatigue  -     Iron Panel (Includes Ferritin, Iron Sat%, Iron, and TIBC); Future  -     TSH, 3rd generation with Free T4 reflex  -     Vitamin D 25 hydroxy; Future  3  Iron deficiency anemia, unspecified iron deficiency anemia type  -     Iron Panel (Includes Ferritin, Iron Sat%, Iron, and TIBC); Future         Subjective     Transitional Care Management Review:   Joanna Schaefer is a 74 y o  female here for TCM follow up  During the TCM phone call patient stated:  TCM Call     Date and time call was made  5/15/2023 11:18 AM    Hospital care reviewed  Records reviewed    Patient was hospitialized at  [address] [place] [address]    Date of Admission  05/04/23    Date of discharge  05/13/23    Diagnosis  Hemorrhagic cystitis w/ pyelonephritis    Disposition  Home    Were the patients medications reviewed and updated  Yes    Current Symptoms  None      TCM Call     Post hospital issues  None    Should patient be enrolled in anticoag monitoring? No    Scheduled for follow up? Yes    Patient refusal reason  Patient following with Oncology  If biopsy turns out to not be cancerous, patient will follow with us      Referrals needed  None    Did you obtain your prescribed medications  Yes    Do you need help managing your prescriptions or medications  No    Is transportation to your appointment needed  No    I have advised the patient to call PCP with any new or worsening symptoms  [address]  Family    The type of support provided  Emotional; Physical    Do you have social support  Yes, as much as I need    Are you recieving any outpatient services  No    Are you recieving home care services  No    Types of home care services  Nurse visit    Are you using any community resources  No    Current waiver services  No    Have you fallen in the last 12 months  No    Interperter language line needed  No    Counseling  Patient    Counseling topics  Importance of RX compliance        HPI   74yo F presents to the office for TCM   - was hospitalized at [facility] from 5/4-13/2023 for evaluation and treatment of Hemorrhagic cystitis w/ pyelonephritis and MONROE       Hospital Course: Kamlesh Saba is a 74 y o  female patient with a past medical history of endometrial cancer s/p chemotherapy currently in remission, DVT, CKD stage III, hypertension, anemia, who originally presented to the hospital on 5/4/2023 due to progressively worsening renal function  Patient was found to have hemorrhagic cystitis with pyelonephritis, initially treated with cefepime for total of 8 days, urine culture returned which grew Staph aureus and patient was ultimately changed to Ancef  During hospitalization urology was consulted who changed the right ureteral stent and inserted a left ureteral stent  Upon admission patient's kidney function was close to 4 however has been progressively improving throughout hospitalization  Nephrology was also consulted who recommended biopsy as an outpatient and to follow-up with nephrology  Physical therapy also evaluated the patient and recommended home with home health  Patient was discharged on Cephalexin to complete a total of 14 days of antibiotics  She will have to repeat BMP prior to her next appointment with urology, as well as a renal ultrasound one week from discharge   She will follow up with her PCP within a "week of discharge, as well as urology and nephrology on outpatient basis  Patient verbalized understanding and is in agreement with this plan  - saw Uro CRNP 5/16/2023 - s/p insertion of L-ureteral stent and R-ureteral stent exchange on 5/9/2023 - next exchange due 8/2023 - has appt scheduled   - has an appt with Nephro 6/7/2023   - completed Keflex today - (+) fatigue and feeling cold   - has script for repeat BMP and CBC (of note, most recent CBC with Hb 8 5, last Fe infusion was 4/13/2023)   - has US scheduled for 5/19/2023   - getting her appetite back slowly       Review of Systems as above     Objective     /64 (BP Location: Left arm, Patient Position: Sitting, Cuff Size: Standard)   Resp 16   Ht 4' 11\" (1 499 m)   Wt 50 3 kg (111 lb)   LMP  (LMP Unknown)   BMI 22 42 kg/m²      Physical Exam  Vitals reviewed  Constitutional:       General: She is not in acute distress  Appearance: Normal appearance  She is not ill-appearing, toxic-appearing or diaphoretic  HENT:      Head: Normocephalic and atraumatic  Right Ear: External ear normal       Left Ear: External ear normal       Nose: Nose normal    Eyes:      General: No scleral icterus  Right eye: No discharge  Left eye: No discharge  Extraocular Movements: Extraocular movements intact  Conjunctiva/sclera: Conjunctivae normal    Cardiovascular:      Rate and Rhythm: Normal rate and regular rhythm  Heart sounds: Normal heart sounds  Pulmonary:      Effort: Pulmonary effort is normal  No respiratory distress  Breath sounds: Normal breath sounds  No stridor  No wheezing, rhonchi or rales  Abdominal:      Palpations: Abdomen is soft  Musculoskeletal:         General: Normal range of motion  Cervical back: Normal range of motion  Right lower leg: No edema  Left lower leg: No edema  Skin:     General: Skin is warm  Neurological:      General: No focal deficit present        Mental " Status: She is alert     Psychiatric:         Mood and Affect: Mood normal          Behavior: Behavior normal        Medications have been reviewed by provider in current encounter    Yasmeen Ordoñez DO

## 2023-05-18 ENCOUNTER — TELEPHONE (OUTPATIENT)
Dept: HEMATOLOGY ONCOLOGY | Facility: CLINIC | Age: 68
End: 2023-05-18

## 2023-05-18 DIAGNOSIS — I82.4Y1 ACUTE DEEP VEIN THROMBOSIS (DVT) OF PROXIMAL VEIN OF RIGHT LOWER EXTREMITY (HCC): ICD-10-CM

## 2023-05-18 RX ORDER — ENOXAPARIN SODIUM 100 MG/ML
80 INJECTION SUBCUTANEOUS EVERY 24 HOURS
Qty: 72 ML | Refills: 1 | Status: SHIPPED | OUTPATIENT
Start: 2023-05-18 | End: 2023-05-31

## 2023-05-18 NOTE — TELEPHONE ENCOUNTER
Patient Call    Who are you speaking with? Patient    If it is not the patient, are they listed on an active communication consent form? N/A   What is the reason for this call? Patient is calling in indicating that she needs a medication refill for her enoxaparin (LOVENOX) 80 mg  But indicates that it is complicated and [facility]    Does this require a call back? Yes   If a call back is required, please list best call back number [phone]   If a call back is required, advise that a message will be forwarded to their care team and someone will return their call as soon as possible  Did you relay this information to the patient?  Yes

## 2023-05-19 ENCOUNTER — HOSPITAL ENCOUNTER (OUTPATIENT)
Dept: RADIOLOGY | Facility: HOSPITAL | Age: 68
Discharge: HOME | End: 2023-05-19

## 2023-05-19 DIAGNOSIS — N13.39 OTHER HYDRONEPHROSIS: ICD-10-CM

## 2023-05-19 DIAGNOSIS — N17.9 ACUTE KIDNEY INJURY (HCC): ICD-10-CM

## 2023-05-22 ENCOUNTER — APPOINTMENT (OUTPATIENT)
Dept: LAB | Facility: CLINIC | Age: 68
End: 2023-05-22

## 2023-05-22 ENCOUNTER — APPOINTMENT (OUTPATIENT)
Dept: PHYSICAL THERAPY | Facility: REHABILITATION | Age: 68
End: 2023-05-22
Payer: COMMERCIAL

## 2023-05-22 DIAGNOSIS — D50.9 IRON DEFICIENCY ANEMIA, UNSPECIFIED IRON DEFICIENCY ANEMIA TYPE: ICD-10-CM

## 2023-05-22 DIAGNOSIS — N13.30 HYDRONEPHROSIS OF RIGHT KIDNEY: ICD-10-CM

## 2023-05-22 DIAGNOSIS — N18.31 STAGE 3A CHRONIC KIDNEY DISEASE (HCC): ICD-10-CM

## 2023-05-22 DIAGNOSIS — N17.9 AKI (ACUTE KIDNEY INJURY) (HCC): ICD-10-CM

## 2023-05-22 DIAGNOSIS — R53.83 OTHER FATIGUE: ICD-10-CM

## 2023-05-22 DIAGNOSIS — R80.9 NEPHROTIC RANGE PROTEINURIA: ICD-10-CM

## 2023-05-22 DIAGNOSIS — N25.81 SECONDARY HYPERPARATHYROIDISM (HCC): ICD-10-CM

## 2023-05-22 DIAGNOSIS — Z79.899 ENCOUNTER FOR LONG-TERM (CURRENT) USE OF OTHER MEDICATIONS: ICD-10-CM

## 2023-05-22 DIAGNOSIS — E55.9 VITAMIN D INSUFFICIENCY: ICD-10-CM

## 2023-05-22 LAB
25(OH)D3 SERPL-MCNC: 37.7 NG/ML (ref 30–100)
ALBUMIN SERPL BCP-MCNC: 2.8 G/DL (ref 3.5–5)
ANION GAP SERPL CALCULATED.3IONS-SCNC: 5 MMOL/L (ref 4–13)
BASOPHILS # BLD AUTO: 0.08 THOUSANDS/ÂΜL (ref 0–0.1)
BASOPHILS NFR BLD AUTO: 1 % (ref 0–1)
BUN SERPL-MCNC: 63 MG/DL (ref 5–25)
CALCIUM ALBUM COR SERPL-MCNC: 10.1 MG/DL (ref 8.3–10.1)
CALCIUM SERPL-MCNC: 9.1 MG/DL (ref 8.3–10.1)
CHLORIDE SERPL-SCNC: 113 MMOL/L (ref 96–108)
CO2 SERPL-SCNC: 16 MMOL/L (ref 21–32)
CREAT SERPL-MCNC: 3.15 MG/DL (ref 0.6–1.3)
EOSINOPHIL # BLD AUTO: 0.14 THOUSAND/ÂΜL (ref 0–0.61)
EOSINOPHIL NFR BLD AUTO: 1 % (ref 0–6)
ERYTHROCYTE [DISTWIDTH] IN BLOOD BY AUTOMATED COUNT: 14.5 % (ref 11.6–15.1)
FERRITIN SERPL-MCNC: 1036 NG/ML (ref 11–307)
FOLATE SERPL-MCNC: >22.3 NG/ML
GFR SERPL CREATININE-BSD FRML MDRD: 14 ML/MIN/1.73SQ M
GLUCOSE SERPL-MCNC: 89 MG/DL (ref 65–140)
HCT VFR BLD AUTO: 29.8 % (ref 34.8–46.1)
HGB BLD-MCNC: 8.7 G/DL (ref 11.5–15.4)
IMM GRANULOCYTES # BLD AUTO: 0.17 THOUSAND/UL (ref 0–0.2)
IMM GRANULOCYTES NFR BLD AUTO: 2 % (ref 0–2)
IRON SATN MFR SERPL: 28 % (ref 15–50)
IRON SERPL-MCNC: 61 UG/DL (ref 50–170)
LYMPHOCYTES # BLD AUTO: 0.76 THOUSANDS/ÂΜL (ref 0.6–4.47)
LYMPHOCYTES NFR BLD AUTO: 8 % (ref 14–44)
MCH RBC QN AUTO: 28 PG (ref 26.8–34.3)
MCHC RBC AUTO-ENTMCNC: 29.2 G/DL (ref 31.4–37.4)
MCV RBC AUTO: 96 FL (ref 82–98)
MONOCYTES # BLD AUTO: 0.76 THOUSAND/ÂΜL (ref 0.17–1.22)
MONOCYTES NFR BLD AUTO: 8 % (ref 4–12)
NEUTROPHILS # BLD AUTO: 8.13 THOUSANDS/ÂΜL (ref 1.85–7.62)
NEUTS SEG NFR BLD AUTO: 80 % (ref 43–75)
NRBC BLD AUTO-RTO: 0 /100 WBCS
PHOSPHATE SERPL-MCNC: 4.3 MG/DL (ref 2.3–4.1)
PLATELET # BLD AUTO: 401 THOUSANDS/UL (ref 149–390)
PMV BLD AUTO: 11.4 FL (ref 8.9–12.7)
POTASSIUM SERPL-SCNC: 3.8 MMOL/L (ref 3.5–5.3)
PTH-INTACT SERPL-MCNC: 211.1 PG/ML (ref 12–88)
RBC # BLD AUTO: 3.11 MILLION/UL (ref 3.81–5.12)
SODIUM SERPL-SCNC: 134 MMOL/L (ref 135–147)
TIBC SERPL-MCNC: 218 UG/DL (ref 250–450)
TSH SERPL DL<=0.05 MIU/L-ACNC: 1.76 UIU/ML (ref 0.45–4.5)
WBC # BLD AUTO: 10.04 THOUSAND/UL (ref 4.31–10.16)

## 2023-05-23 ENCOUNTER — APPOINTMENT (OUTPATIENT)
Dept: LAB | Facility: CLINIC | Age: 68
End: 2023-05-23

## 2023-05-23 DIAGNOSIS — E87.20 METABOLIC ACIDOSIS: Primary | ICD-10-CM

## 2023-05-23 DIAGNOSIS — R80.9 PROTEINURIA, UNSPECIFIED TYPE: ICD-10-CM

## 2023-05-23 DIAGNOSIS — N17.9 AKI (ACUTE KIDNEY INJURY) (HCC): ICD-10-CM

## 2023-05-23 DIAGNOSIS — N18.31 CHRONIC KIDNEY DISEASE (CKD) STAGE G3A/A1, MODERATELY DECREASED GLOMERULAR FILTRATION RATE (GFR) BETWEEN 45-59 ML/MIN/1.73 SQUARE METER AND ALBUMINURIA CREATININE RATIO LESS THAN 30 MG/G (HCC): ICD-10-CM

## 2023-05-23 RX ORDER — SODIUM BICARBONATE 650 MG/1
650 TABLET ORAL 3 TIMES DAILY
Qty: 270 TABLET | Refills: 3 | Status: SHIPPED | OUTPATIENT
Start: 2023-05-23 | End: 2023-08-21

## 2023-05-23 NOTE — PROGRESS NOTES
Follow-up acute kidney injury  Recent admission for acute kidney injury with peak creatinine of 4 and discharge creatinine around 2 5  Etiology was thought to be secondary to bilateral hydronephrosis status post right-sided ureteral stent exchange and left-sided ureteral stent placement  Follow-up creatinine rising again to 3 2  Follow-up ultrasound 05/19 with stable bilateral hydronephrosis and bilateral ureteral stents  Spoke to the patient on the phone  Feels much better from before  Denies diarrhea  Denies trouble with urination  Complains of shortness of breath on exertion  Advised continued hydration  Added sodium bicarbonate 650 mg 3 times daily  Recommended to check renal function panel on Thursday  I will reach out to urology to have them look at the ultrasound

## 2023-05-24 LAB — BACTERIA UR CULT: NORMAL

## 2023-05-25 ENCOUNTER — APPOINTMENT (OUTPATIENT)
Dept: LAB | Facility: CLINIC | Age: 68
End: 2023-05-25

## 2023-05-25 DIAGNOSIS — N17.9 AKI (ACUTE KIDNEY INJURY) (HCC): ICD-10-CM

## 2023-05-25 DIAGNOSIS — E87.20 METABOLIC ACIDOSIS: ICD-10-CM

## 2023-05-25 LAB
ALBUMIN SERPL BCP-MCNC: 3.4 G/DL (ref 3.5–5)
ANION GAP SERPL CALCULATED.3IONS-SCNC: 11 MMOL/L (ref 4–13)
BUN SERPL-MCNC: 73 MG/DL (ref 5–25)
CALCIUM ALBUM COR SERPL-MCNC: 9.3 MG/DL (ref 8.3–10.1)
CALCIUM SERPL-MCNC: 8.8 MG/DL (ref 8.4–10.2)
CHLORIDE SERPL-SCNC: 106 MMOL/L (ref 96–108)
CO2 SERPL-SCNC: 18 MMOL/L (ref 21–32)
CREAT SERPL-MCNC: 3.06 MG/DL (ref 0.6–1.3)
GFR SERPL CREATININE-BSD FRML MDRD: 15 ML/MIN/1.73SQ M
GLUCOSE P FAST SERPL-MCNC: 89 MG/DL (ref 65–99)
PHOSPHATE SERPL-MCNC: 5.3 MG/DL (ref 2.3–4.1)
POTASSIUM SERPL-SCNC: 3.8 MMOL/L (ref 3.5–5.3)
SODIUM SERPL-SCNC: 135 MMOL/L (ref 135–147)

## 2023-05-26 ENCOUNTER — HOSPITAL ENCOUNTER (INPATIENT)
Facility: HOSPITAL | Age: 68
LOS: 5 days | Discharge: HOME HEALTH | End: 2023-05-31
Attending: EMERGENCY MEDICINE | Admitting: INTERNAL MEDICINE

## 2023-05-26 ENCOUNTER — APPOINTMENT (EMERGENCY)
Dept: CT IMAGING | Facility: HOSPITAL | Age: 68
End: 2023-05-26

## 2023-05-26 ENCOUNTER — TELEPHONE (OUTPATIENT)
Dept: UROLOGY | Facility: CLINIC | Age: 68
End: 2023-05-26

## 2023-05-26 DIAGNOSIS — N13.30 HYDRONEPHROSIS: Primary | ICD-10-CM

## 2023-05-26 DIAGNOSIS — N13.39 OTHER HYDRONEPHROSIS: Primary | ICD-10-CM

## 2023-05-26 DIAGNOSIS — I82.4Y1 ACUTE DEEP VEIN THROMBOSIS (DVT) OF PROXIMAL VEIN OF RIGHT LOWER EXTREMITY (HCC): ICD-10-CM

## 2023-05-26 DIAGNOSIS — N18.9 CHRONIC KIDNEY DISEASE, UNSPECIFIED CKD STAGE: ICD-10-CM

## 2023-05-26 DIAGNOSIS — Z86.718 HISTORY OF DVT (DEEP VEIN THROMBOSIS): ICD-10-CM

## 2023-05-26 DIAGNOSIS — N17.9 ACUTE KIDNEY INJURY (HCC): ICD-10-CM

## 2023-05-26 DIAGNOSIS — R26.2 AMBULATORY DYSFUNCTION: ICD-10-CM

## 2023-05-26 LAB
ALBUMIN SERPL BCP-MCNC: 3.3 G/DL (ref 3.5–5)
ALP SERPL-CCNC: 104 U/L (ref 34–104)
ALT SERPL W P-5'-P-CCNC: 5 U/L (ref 7–52)
ANION GAP SERPL CALCULATED.3IONS-SCNC: 10 MMOL/L (ref 4–13)
APTT PPP: 49 SECONDS (ref 23–37)
AST SERPL W P-5'-P-CCNC: 7 U/L (ref 13–39)
BASOPHILS # BLD AUTO: 0.04 THOUSANDS/ÂΜL (ref 0–0.1)
BASOPHILS NFR BLD AUTO: 0 % (ref 0–1)
BILIRUB SERPL-MCNC: 0.22 MG/DL (ref 0.2–1)
BUN SERPL-MCNC: 73 MG/DL (ref 5–25)
CALCIUM ALBUM COR SERPL-MCNC: 9 MG/DL (ref 8.3–10.1)
CALCIUM SERPL-MCNC: 8.4 MG/DL (ref 8.4–10.2)
CHLORIDE SERPL-SCNC: 105 MMOL/L (ref 96–108)
CO2 SERPL-SCNC: 18 MMOL/L (ref 21–32)
CREAT SERPL-MCNC: 3.17 MG/DL (ref 0.6–1.3)
EOSINOPHIL # BLD AUTO: 0.2 THOUSAND/ÂΜL (ref 0–0.61)
EOSINOPHIL NFR BLD AUTO: 2 % (ref 0–6)
ERYTHROCYTE [DISTWIDTH] IN BLOOD BY AUTOMATED COUNT: 14.6 % (ref 11.6–15.1)
ERYTHROCYTE [DISTWIDTH] IN BLOOD BY AUTOMATED COUNT: 14.7 % (ref 11.6–15.1)
GFR SERPL CREATININE-BSD FRML MDRD: 14 ML/MIN/1.73SQ M
GLUCOSE SERPL-MCNC: 88 MG/DL (ref 65–140)
HCT VFR BLD AUTO: 26.8 % (ref 34.8–46.1)
HCT VFR BLD AUTO: 29.1 % (ref 34.8–46.1)
HGB BLD-MCNC: 8 G/DL (ref 11.5–15.4)
HGB BLD-MCNC: 8.7 G/DL (ref 11.5–15.4)
IMM GRANULOCYTES # BLD AUTO: 0.14 THOUSAND/UL (ref 0–0.2)
IMM GRANULOCYTES NFR BLD AUTO: 2 % (ref 0–2)
INR PPP: 1.09 (ref 0.84–1.19)
LYMPHOCYTES # BLD AUTO: 0.72 THOUSANDS/ÂΜL (ref 0.6–4.47)
LYMPHOCYTES NFR BLD AUTO: 8 % (ref 14–44)
MCH RBC QN AUTO: 28 PG (ref 26.8–34.3)
MCH RBC QN AUTO: 28.2 PG (ref 26.8–34.3)
MCHC RBC AUTO-ENTMCNC: 29.9 G/DL (ref 31.4–37.4)
MCHC RBC AUTO-ENTMCNC: 29.9 G/DL (ref 31.4–37.4)
MCV RBC AUTO: 94 FL (ref 82–98)
MCV RBC AUTO: 94 FL (ref 82–98)
MONOCYTES # BLD AUTO: 0.7 THOUSAND/ÂΜL (ref 0.17–1.22)
MONOCYTES NFR BLD AUTO: 8 % (ref 4–12)
NEUTROPHILS # BLD AUTO: 7.18 THOUSANDS/ÂΜL (ref 1.85–7.62)
NEUTS SEG NFR BLD AUTO: 80 % (ref 43–75)
NRBC BLD AUTO-RTO: 0 /100 WBCS
PLATELET # BLD AUTO: 273 THOUSANDS/UL (ref 149–390)
PLATELET # BLD AUTO: 309 THOUSANDS/UL (ref 149–390)
PMV BLD AUTO: 9.4 FL (ref 8.9–12.7)
PMV BLD AUTO: 9.4 FL (ref 8.9–12.7)
POTASSIUM SERPL-SCNC: 4.1 MMOL/L (ref 3.5–5.3)
PROT SERPL-MCNC: 7.5 G/DL (ref 6.4–8.4)
PROTHROMBIN TIME: 14.4 SECONDS (ref 11.6–14.5)
RBC # BLD AUTO: 2.84 MILLION/UL (ref 3.81–5.12)
RBC # BLD AUTO: 3.11 MILLION/UL (ref 3.81–5.12)
SODIUM SERPL-SCNC: 133 MMOL/L (ref 135–147)
WBC # BLD AUTO: 8.98 THOUSAND/UL (ref 4.31–10.16)
WBC # BLD AUTO: 9.84 THOUSAND/UL (ref 4.31–10.16)

## 2023-05-26 RX ORDER — PANTOPRAZOLE SODIUM 40 MG/1
40 TABLET, DELAYED RELEASE ORAL
Status: DISCONTINUED | OUTPATIENT
Start: 2023-05-27 | End: 2023-05-31 | Stop reason: HOSPADM

## 2023-05-26 RX ORDER — SODIUM CHLORIDE 9 MG/ML
50 INJECTION, SOLUTION INTRAVENOUS CONTINUOUS
Status: DISCONTINUED | OUTPATIENT
Start: 2023-05-26 | End: 2023-05-27

## 2023-05-26 RX ORDER — HEPARIN SODIUM 10000 [USP'U]/100ML
3-20 INJECTION, SOLUTION INTRAVENOUS
Status: DISCONTINUED | OUTPATIENT
Start: 2023-05-26 | End: 2023-05-27

## 2023-05-26 RX ORDER — HEPARIN SODIUM 1000 [USP'U]/ML
1500 INJECTION, SOLUTION INTRAVENOUS; SUBCUTANEOUS EVERY 6 HOURS PRN
Status: DISCONTINUED | OUTPATIENT
Start: 2023-05-26 | End: 2023-05-31 | Stop reason: HOSPADM

## 2023-05-26 RX ORDER — POLYETHYLENE GLYCOL 3350 17 G/17G
17 POWDER, FOR SOLUTION ORAL DAILY
Status: DISCONTINUED | OUTPATIENT
Start: 2023-05-27 | End: 2023-05-31 | Stop reason: HOSPADM

## 2023-05-26 RX ORDER — AMOXICILLIN 250 MG
1 CAPSULE ORAL 2 TIMES DAILY
Status: DISCONTINUED | OUTPATIENT
Start: 2023-05-26 | End: 2023-05-31 | Stop reason: HOSPADM

## 2023-05-26 RX ORDER — SODIUM BICARBONATE 650 MG/1
650 TABLET ORAL 3 TIMES DAILY
Status: DISCONTINUED | OUTPATIENT
Start: 2023-05-26 | End: 2023-05-31 | Stop reason: HOSPADM

## 2023-05-26 RX ORDER — OXYBUTYNIN CHLORIDE 5 MG/1
15 TABLET, EXTENDED RELEASE ORAL
Status: DISCONTINUED | OUTPATIENT
Start: 2023-05-26 | End: 2023-05-31 | Stop reason: HOSPADM

## 2023-05-26 RX ORDER — HEPARIN SODIUM 1000 [USP'U]/ML
3000 INJECTION, SOLUTION INTRAVENOUS; SUBCUTANEOUS EVERY 6 HOURS PRN
Status: DISCONTINUED | OUTPATIENT
Start: 2023-05-26 | End: 2023-05-31 | Stop reason: HOSPADM

## 2023-05-26 RX ORDER — ACETAMINOPHEN 325 MG/1
650 TABLET ORAL EVERY 6 HOURS PRN
Status: DISCONTINUED | OUTPATIENT
Start: 2023-05-26 | End: 2023-05-31 | Stop reason: HOSPADM

## 2023-05-26 RX ORDER — ARIPIPRAZOLE 5 MG/1
20 TABLET ORAL DAILY
Status: DISCONTINUED | OUTPATIENT
Start: 2023-05-27 | End: 2023-05-31 | Stop reason: HOSPADM

## 2023-05-26 RX ORDER — VENLAFAXINE 37.5 MG/1
75 TABLET ORAL 3 TIMES DAILY
Status: DISCONTINUED | OUTPATIENT
Start: 2023-05-26 | End: 2023-05-31 | Stop reason: HOSPADM

## 2023-05-26 RX ADMIN — VENLAFAXINE 75 MG: 37.5 TABLET ORAL at 21:59

## 2023-05-26 RX ADMIN — SODIUM CHLORIDE 50 ML/HR: 0.9 INJECTION, SOLUTION INTRAVENOUS at 18:43

## 2023-05-26 RX ADMIN — SODIUM BICARBONATE 650 MG: 650 TABLET ORAL at 21:59

## 2023-05-26 RX ADMIN — SENNOSIDES AND DOCUSATE SODIUM 1 TABLET: 50; 8.6 TABLET ORAL at 18:49

## 2023-05-26 RX ADMIN — OXYBUTYNIN CHLORIDE 15 MG: 5 TABLET, EXTENDED RELEASE ORAL at 21:59

## 2023-05-26 RX ADMIN — HEPARIN SODIUM 12 UNITS/KG/HR: 10000 INJECTION, SOLUTION INTRAVENOUS at 19:56

## 2023-05-26 NOTE — TELEPHONE ENCOUNTER
Discussed most recent findings and recommendations with pt regarding the need to go to the nearest hospital for admission and insertion of nephrostomy tube given worsening renal function and persistent hydronephrosis  Pt verbalized understanding and en route to [facility] ER  Contacted tess SANCHEZ at [facility] to notify  PAC referral provided with request/recommendation for Non-contrast CT to be performed on arrival          Component       BUN Creatinine   Latest Ref Rng & Units       5 - 25 mg/dL 0 60 - 1 30 mg/dL   5/9/2023      5:25 AM 52 (H) 3 01 (H)   5/10/2023      6:45 AM 47 (H) 2 76 (H)   5/11/2023      5:06 AM 45 (H) 2 65 (H)   5/12/2023      5:26 AM 42 (H) 2 32 (H)   5/13/2023      4:56 AM 39 (H) 2 47 (H)   5/22/2023      11:54 AM 63 (H) 3 15 (H)   5/25/2023      8:19 AM 73 (H) 3 06 (H)       RENAL ULTRASOUND- 5/19/2023     INDICATION:   N17 9: Acute kidney failure, unspecified  N13 39: Other hydronephrosis      COMPARISON: 5/7/2023     TECHNIQUE:   Ultrasound of the retroperitoneum was performed with a curvilinear transducer utilizing volumetric sweeps and still imaging techniques      FINDINGS:     KIDNEYS:  Symmetric and normal size  Right kidney: 8 9 x 4 8 x 4 4 cm  Volume 101 mL  Left kidney: 10 5 x 5 1 x 6 1 cm  Volume 171 mL     Right kidney  Normal echogenicity and contour  No mass is identified  Simple right renal cysts measure up to 2 9 cm are identified  Right ureteral stent is present  Moderate right hydronephrosis is noted similar to prior exam   No shadowing calculi  No perinephric fluid collections      Left kidney  Normal echogenicity and contour  No mass is identified  Left ureteral stent is present  Moderate left hydronephrosis is noted  4 mm nonobstructing left renal calculus is present  No perinephric fluid collections      URETERS:  Nonvisualized      BLADDER:  Normally distended  No focal thickening or mass lesions  Bilateral ureteral jets detected    Chronic bladder wall thickening is noted         IMPRESSION:     Bilateral ureteral stents with stable moderate bilateral renal stents      Nonobstructing left renal calculus      Chronic bladder wall thickening      RAFITA Pope

## 2023-05-26 NOTE — H&P
[facility] Hospitalist [facility] Attending Physician Attestation Note - Admission    I have seen and examined Ragini Melendez personally and have reviewed the medical record independently  I have reviewed the case with the resident physician including all assessments and the plan of care for each  I agree with the resident physician and offer the following addendum to the below statements by the resident physician:     Date Evaluated: 5/26/23  Time Evaluated: 5pm     Subjective / HPI: 60-year-old female with history of CKD, endometrial cancer with history of bilateral ureteric strictures postradiation with bilateral ureteric stent placement because of bilateral hydronephrosis presents with worsening of renal functions and hydronephrosis  Exam:   Vitals:    05/26/23 1621   BP: 98/66   Pulse:    Resp:    Temp: (!) 97 3 °F (36 3 °C)   SpO2:    Appears well-nourished  No flank tenderness  Lungs clear to auscultation bilaterally  Vascular exam has regular first second heart sound  Abdomen soft nontender sounds audible  CT scan and labs reviewed  Assessment and Plan:  · Bilateral hydronephrosis s/p bilateral ureteric stents  · Bilateral radiation-induced strictures of the ureter  · History of cystitis  · History of endometrial cancer  · Acute kidney injury on chronic kidney disease stage III  · Nephrotic range proteinuria  · Anemia of chronic disease  · Severe protein calorie malnutrition  · Vitamin D deficiency  Patient continues to have worsening renal function despite bilateral ureteric stent  CT scan shows moderate right and mild to moderate left hydronephrosis despite stent placement  Creatinine on 513 was 2 47 however elevated at 3 17 today  In the setting of worsening hydronephrosis and renal functions, patient will need bilateral percutaneous nephrostomy tube placed by IR  Follow-up with urinalysis to rule out any underlying urinary tract infection  Empiric antibiotics pending urine culture results    IR consultation will be obtained  Urology input appreciated  Education and Discussions with Family / Patient: Discussed with patient and family at bedside    Time Spent for Care: 75 min  More than 50% of total time spent on counseling and coordination of care as described above  Current Patient Status: Inpatient   Anticipated Length of Stay:  Patient will be admitted on an Inpatient basis with an anticipated length of stay of > 2 midnights  Justification for Hospital Stay: Urology evaluation, IV antibiotics and need for bilateral percutaneous nephrostomy placement    Epic / Care Everywhere Records Reviewed Independently: Yes     For detailed history, assessment, and plan of care, please review the statements below by resident physician      Hua Frost MD

## 2023-05-26 NOTE — TELEMEDICINE
Don't think we have appointments. But I did order urinalysis. Please help arrange lab appointment.   e-Consult (IPC)  - Interventional Radiology  Ragini Melendez 74 y o  female MRN: [id number]  Unit/Bed#: W -01 Encounter: [id number]          Interventional Radiology [facility]    Inpatient Consult to IR  Consult performed by: Jaz Elizondo MD  Consult ordered by: Aurea Malave PA-C        05/26/23    Assessment/Recommendation:     74year old female with history of endometrial cancer s/p radiation  Bilateral hydronephrosis has worsened despite bilateral internal ureteral stent placement  On CT from 5/26, moderate hydro is present on the right, mild left  No evidence of infection at this time  Creatinine is elevated at 3 17, increasing from two weeks prior where it was around 2 5  Plan for bilateral nephrostomy tube placement tomorrow  Patient ate today  Hold aspirin, last dose yesterday  Platelet / INR acceptable to proceed  11-20 minutes, >50% of the total time devoted to medical consultative verbal/EMR discussion between providers  Written report will be generated in the EMR  Thank you for allowing Interventional Radiology to participate in the care of [address] [place] [address]  Please don't hesitate to call or TigerText us with any questions       Jaz Elizondo MD

## 2023-05-26 NOTE — H&P
[facility]  H&P  Name: Norma Landry 74 y o  female I MRN: [id number]  Unit/Bed#: W -01 I Date of Admission: 5/26/2023   Date of Service: 5/26/2023 I Hospital Day: 0      Assessment/Plan   * Acute on chronic kidney disease  Assessment & Plan  Recent Labs     05/25/23  0819 05/26/23  1517   CREATININE 3 06* 3 17*   EGFR 15 14     Estimated Creatinine Clearance: 13 6 mL/min (A) (by C-G formula based on SCr of 3 17 mg/dL (H))  With creatinine POA of 3 17  Recently admitted for MONROE in the setting of obstructed ureteral stent, status post stent replacement  Creatinine at discharge on 5/13/2022 was 2 47, now as high as 3 17  Per her urologist, this is likely in the setting of recurrent ureteral stent obstruction  Was sent in to the ED today for imaging and bilateral nephrostomy tube placement with IR   CT of the abdomen and pelvis demonstrated moderate right and mild to moderate left hydronephrosis with ureteral stents in place  Persistent wall thickening of bladder concerning for cystitis which I suspect is likely in the setting of radiation  Was also being worked up by outpatient nephrologist for this and was going to undergo kidney biopsy if no improvement after ureteral stent exchange after last admission  Per Dr Vargas Ziegler this may also be due to glomerulopathy vs side effect of ENDOBARR treatment  Plan:  Plan to undergo bilateral nephrostomy tube placement with IR tomorrow  We will continue monitoring renal indices  Consider nephrology consult if creatinine remains unchanged or continues trending up after procedure  Avoid nephrotoxic agents  Avoid hypotension    History of DVT (deep vein thrombosis)  Assessment & Plan  Diagnosed with DVT in 2019, per patient, failed PO treatment with recurrence of dvt and was placed on Lovenox BID  B/l venous duplex performed during last admission did not show any DVT and patient was advised to discuss the Lovenox with her gyn-onc     At this time given MONROE, will place patient on heparin gtt  Continue monitoring  Constipation  Assessment & Plan  Patient reports constipation at baseline for which she takes MiraLAX and senna  We will order bowel regimen    Anemia  Assessment & Plan  Suspect this is likely anemia of chronic disease given recent iron panel  Endometrial cancer [facility])  Assessment & Plan  Diagnosed in 2017, status postchemotherapy and radiation with resultant ureteric strictures  Completed ENDOBARR (Bevacizumab, Atezolizumab and Rucaparib) clinical trial in 12/2022, now in remission and under active surveilance  Follows with [facility] gyn-onc  Major depression, chronic  Assessment & Plan  Continue PTA aripiprazole 20 mg daily and venlafaxine 75 mg 3 times daily  VTE Pharmacologic Prophylaxis: VTE Score: 6 High Risk (Score >/= 5) - Pharmacological DVT Prophylaxis Ordered: heparin drip  Sequential Compression Devices Ordered  Code Status: Level 1 - Full Code   Discussion with family: Updated  (sister) at bedside  Anticipated Length of Stay: Patient will be admitted on an inpatient basis with an anticipated length of stay of greater than 2 midnights secondary to acute on chronic kidney disease  Chief Complaint: abnormal lab    History of Present Illness:  Ellie Leon is a 74 y o  female with a PMH of endometrial carcinoma s/p chemo and radiation with 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, remote history of DVT in 2019, anemia, and previously stage 3 CKD who presents due to elevated creatinine noted on outpatient workup with concern for ureteral stent occlusion  Of note, patient was recently admitted to the [facility] service and underwent ureteral stent replacement  She was discharged with a creatinine of 2 47 on 5/13/23   She states that since admission she has felt much better and has been progressively improving, her only complaint was a 2/10 right sided back pain that she attributes to her kidney  Patient was discussed with IR and is planned to undergo bilateral nephrostomy tube placement on 5/27/23  Review of Systems:  Review of Systems   Constitutional: Negative for activity change, appetite change, fatigue, fever and unexpected weight change  HENT: Negative for congestion, postnasal drip, rhinorrhea, sinus pressure, sore throat and trouble swallowing  Eyes: Negative for photophobia and visual disturbance  Respiratory: Negative for cough, chest tightness, shortness of breath and wheezing  Cardiovascular: Negative for chest pain, palpitations and leg swelling  Gastrointestinal: Negative for abdominal distention, abdominal pain, constipation, diarrhea, nausea and vomiting  Endocrine: Negative for polydipsia and polyuria  Genitourinary: Negative for difficulty urinating, dysuria, frequency and hematuria  Musculoskeletal: Positive for back pain (right sided)  Negative for arthralgias and myalgias  Skin: Negative for color change, pallor and rash  Neurological: Negative for dizziness, facial asymmetry, weakness, light-headedness, numbness and headaches  Psychiatric/Behavioral: Negative for agitation, behavioral problems, confusion and dysphoric mood  All other systems reviewed and are negative        Past Medical and Surgical History:   Past Medical History:   Diagnosis Date   • Anxiety    • CKD (chronic kidney disease) stage 3, GFR 30-59 ml/min ([facility] [address] )    • COVID     Fall 2022   • Depression    • Endometrial cancer ([facility] [address] ) 12/2017   • GERD (gastroesophageal reflux disease)    • H/O gastric bypass    • Hard to intubate     pt denies   • History of chemotherapy     started 12/2021endometrial cancer- done 12/23/22   • History of DVT in adulthood     RLE   • Hyperglycemia     vx type 2 dm -- last assessed 4/1/14; resolved 11/7/17   • Hyperlipidemia    • Hypertension     in past- no meds at present   • Iron deficiency anemia     iron infusions weekly   • OAB (overactive bladder)    • Port-A-Cath in place    • Wears glasses        Past Surgical History:   Procedure Laterality Date   • ABDOMINAL SURGERY      GASTRIC BYPASS; 2001   • BREAST BIOPSY Right 06/28/2019    core biopsy; benign   • CHOLECYSTECTOMY      at the time of gastric bypass   • COLONOSCOPY     • CT NEEDLE BIOPSY LYMPH NODE  07/08/2019   • FL GUIDED CENTRAL VENOUS ACCESS DEVICE INSERTION  11/12/2019   • FL RETROGRADE PYELOGRAM  3/30/2023   • FL RETROGRADE PYELOGRAM  5/9/2023   • GASTRIC BYPASS     • HYSTERECTOMY Bilateral 2017    total abdominal with salpingo-oophorectomy   • IR NEPHROSTOMY TO NEPHROURETERAL STENT  06/09/2022   • IR NEPHROSTOMY TO NEPHROURETERAL STENT  12/20/2022   • IR NEPHROSTOMY TUBE CHECK/CHANGE/REPOSITION/REINSERTION/UPSIZE  05/27/2022   • IR NEPHROSTOMY TUBE PLACEMENT  07/26/2019   • IR NEPHROURETERAL STENT CHECK/CHANGE/REPOSITION  04/06/2021   • IR NEPHROURETERAL STENT CHECK/CHANGE/REPOSITION  06/04/2021   • IR NEPHROURETERAL STENT CHECK/CHANGE/REPOSITION  09/03/2021   • IR NEPHROURETERAL STENT CHECK/CHANGE/REPOSITION  12/07/2021   • IR NEPHROURETERAL STENT CHECK/CHANGE/REPOSITION  03/08/2022   • IR NEPHROURETERAL STENT CHECK/CHANGE/REPOSITION  05/10/2022   • IR NEPHROURETERAL STENT CHECK/CHANGE/REPOSITION  09/19/2022   • IR PICC LINE  09/27/2019   • IR PORT PLACEMENT  07/26/2019   • IR PORT REMOVAL  09/20/2019   • OOPHORECTOMY Bilateral 2017   • KY CYSTO BLADDER W/URETERAL CATHETERIZATION Right 3/30/2023    Procedure: CYSTOSCOPY RETROGRADE PYELOGRAM WITH exchange of STENT URETERAL;  Surgeon: Jamilah Pittman MD;  [facility];  Service: Urology   • KY CYSTO BLADDER W/URETERAL CATHETERIZATION Bilateral 5/9/2023    Procedure: CYSTOSCOPY, BILATERAL RETROGRADE PYELOGRAM, WITH LEFT INSERTION STENT URETERAL, RIGHT [address];  Surgeon: Tamie Wells MD;  Location[facility];  Service: Urology   • KY CYSTO W/INSERT URETERAL STENT Right 3/30/2023 Procedure: EXCHANGE STENT URETERAL;  Surgeon: Sotero Christopher MD;  Location: [facility];  Service: Urology   • VA INSJ TUNNELED CTR VAD W/SUBQ PORT AGE 5 YR/> Left 11/12/2019    Procedure: INSERTION VENOUS PORT ( PORT-A-CATH) IR;  Surgeon: Ricky Lara DO;  [facility];  Service: Interventional Radiology   • VA LAPS ABD PRTM&OMENTUM DX W/WO SPEC BR/WA SPX N/A 12/19/2017    Procedure: LAPAROSCOPY DIAGNOSTIC;  Surgeon: Edwige Pickett MD;  [facility];  Service: Gynecology Oncology   • VA LAPS W/RAD HYST W/BILAT LMPHADEC RMVL TUBE/OVARY N/A 12/19/2017    Procedure: HYSTERECTOMY LAPAROSCOPIC TOTAL ([address]) W/ ROBOTICS; BILATERAL SALPINGOOPHERECTOMY; LYMPH NODE DISSECTION; lysis of adhesions;  Surgeon: Edwige Pickett MD;  Location: [facility];  Service: Gynecology Oncology   • TONSILLECTOMY     • US GUIDED BREAST BIOPSY RIGHT COMPLETE Right 06/28/2019       Meds/Allergies:  Prior to Admission medications    Medication Sig Start Date End Date Taking? Authorizing Provider   ARIPiprazole (ABILIFY) 20 MG tablet Take 20 mg by mouth in the morning   2/10/22   Historical Provider, MD   aspirin (ECOTRIN LOW STRENGTH) 81 mg EC tablet Take 81 mg by mouth every evening    Historical Provider, MD   Calcium-Magnesium-Vitamin D (CALCIUM 500 PO) Take by mouth daily     Historical Provider, MD   cholecalciferol (VITAMIN D3) 1,000 units tablet Take 4 tablets (4,000 Units total) by mouth daily 2/1/23   Peg Gaitan DO   Cranberry-Vitamin C 250-60 MG CAPS Take 1 tablet by mouth in the morning 7/12/22   RAFITA Ramos   Cyanocobalamin (VITAMIN B12 PO) Take by mouth daily     Historical Provider, MD   dronabinol (MARINOL) 2 5 mg capsule Take 1 capsule (2 5 mg total) by mouth 2 (two) times a day before meals 8/26/22   Debo Renteria PA-C   enoxaparin (LOVENOX) 80 mg/0 8 mL Inject 0 8 mL (80 mg total) under the skin every 24 hours 5/18/23   RAFITA Gomez   folic acid (FOLVITE) 1 mg tablet Take 1 tablet (1 mg total) by mouth daily 10/8/19   Juan Ramon Lowe DO   Gemtesa 75 MG TABS TAKE 75 MG BY MOUTH IN THE MORNING 22   RAFITA Brown   Multiple Vitamin (MULTIVITAMIN) tablet Take 1 tablet by mouth daily    Historical Provider, MD   naloxone (NARCAN) 4 mg/0 1 mL nasal spray Administer 1 spray into a nostril  If no response after 2-3 minutes, give another dose in the other nostril using a new spray  21   Ciarra Santiago MD   omeprazole (PriLOSEC) 20 mg delayed release capsule Take 20 mg by mouth daily    Historical Provider, MD   oxybutynin (DITROPAN XL) 15 MG 24 hr tablet Take 1 tablet (15 mg total) by mouth daily at bedtime 10/5/22   RAFITA Oviedo   saccharomyces boulardii (FLORASTOR) 250 mg capsule Take 1 capsule (250 mg total) by mouth 2 (two) times a day 10/8/19   Juan Ramon Lowe DO   senna (SENOKOT) 8 6 MG tablet Take 1 tablet (8 6 mg total) by mouth 2 (two) times a day as needed for constipation 22 RAFITA Hubbard   sodium bicarbonate 650 mg tablet Take 1 tablet (650 mg total) by mouth 3 (three) times a day 23  Darnell Thompson MD   [facility]) 75 mg tablet Take 75 mg by mouth 3 (three) times a day      Historical Provider, MD WISE have reviewed home medications with patient personally  Allergies:    Allergies   Allergen Reactions   • Cephalosporins Rash     Which Cephalosporin reaction was to not specified; however, has tolerated Amoxicillin, Cefazolin, and Cefepime       Social History:  Marital Status: Single   Patient Pre-hospital Living Situation: Home  Patient Pre-hospital Level of Mobility: walks  Patient Pre-hospital Diet Restrictions: None  Substance Use History:   Social History     Substance and Sexual Activity   Alcohol Use Not Currently     Social History     Tobacco Use   Smoking Status Never   Smokeless Tobacco Never     Social History     Substance and Sexual Activity   Drug Use No       Family History:  Family History Problem Relation Age of Onset   • Hyperlipidemia Mother    • Heart disease Mother    • Ovarian cancer Mother 48   • Colon cancer Mother    • Lymphoma Father    • Breast cancer Sister 61   • No Known Problems Brother    • No Known Problems Brother    • No Known Problems Maternal Grandmother    • Bone cancer Maternal Grandfather    • Uterine cancer Paternal Grandmother    • No Known Problems Paternal Grandfather    • No Known Problems Maternal Aunt    • No Known Problems Maternal Aunt    • No Known Problems Maternal Aunt    • No Known Problems Maternal Aunt    • No Known Problems Paternal Aunt    • No Known Problems Paternal Aunt    • No Known Problems Paternal Aunt    • No Known Problems Paternal Aunt        Physical Exam:     Vitals:   Blood Pressure: 98/66 (05/26/23 1621)  Pulse: 80 (05/26/23 1600)  Temperature: (!) 97 3 °F (36 3 °C) (05/26/23 1621)  Temp Source: Oral (05/26/23 1621)  Respirations: 16 (05/26/23 1600)  SpO2: 97 % (05/26/23 1600)    Physical Exam  Vitals and nursing note reviewed  Constitutional:       General: She is not in acute distress  Appearance: Normal appearance  She is cachectic  She is not ill-appearing, toxic-appearing or diaphoretic  HENT:      Head: Normocephalic and atraumatic  Nose: Nose normal       Mouth/Throat:      Mouth: Mucous membranes are moist       Pharynx: Oropharynx is clear  Eyes:      General: No scleral icterus  Right eye: No discharge  Left eye: No discharge  Extraocular Movements: Extraocular movements intact  Conjunctiva/sclera: Conjunctivae normal    Cardiovascular:      Rate and Rhythm: Normal rate and regular rhythm  Pulses: Normal pulses  Heart sounds: Normal heart sounds  No murmur heard  Pulmonary:      Effort: Pulmonary effort is normal  No respiratory distress  Breath sounds: Normal breath sounds  No wheezing, rhonchi or rales  Abdominal:      General: Abdomen is flat   Bowel sounds are normal  There is no distension  Palpations: Abdomen is soft  Tenderness: There is no abdominal tenderness  There is right CVA tenderness  There is no left CVA tenderness, guarding or rebound  Musculoskeletal:      Cervical back: Normal range of motion and neck supple  Right lower leg: No edema  Left lower leg: No edema  Skin:     General: Skin is warm and dry  Coloration: Skin is not jaundiced or pale  Neurological:      Mental Status: She is alert and oriented to person, place, and time  Mental status is at baseline  Psychiatric:         Mood and Affect: Mood normal          Behavior: Behavior normal          Thought Content: Thought content normal          Judgment: Judgment normal           Additional Data:     Lab Results:  Results from last 7 days   Lab Units 05/26/23  1820 05/26/23  1517   EOS PCT %  --  2   HEMATOCRIT % 29 1* 26 8*   HEMOGLOBIN g/dL 8 7* 8 0*   LYMPHS PCT %  --  8*   MONOS PCT %  --  8   NEUTROS PCT %  --  80*   PLATELETS Thousands/uL 309 273   WBC Thousand/uL 9 84 8 98     Results from last 7 days   Lab Units 05/26/23  1517   ANION GAP mmol/L 10   ALBUMIN g/dL 3 3*   ALK PHOS U/L 104   ALT U/L 5*   AST U/L 7*   BUN mg/dL 73*   CALCIUM mg/dL 8 4   CHLORIDE mmol/L 105   CO2 mmol/L 18*   CREATININE mg/dL 3 17*   GLUCOSE RANDOM mg/dL 88   POTASSIUM mmol/L 4 1   SODIUM mmol/L 133*   TOTAL BILIRUBIN mg/dL 0 22     Results from last 7 days   Lab Units 05/26/23  1820   INR  1 09                   Lines/Drains:  Invasive Devices     Central Venous Catheter Line  Duration           Port A Cath 11/12/19 Left Chest 1291 days          Peripheral Intravenous Line  Duration           Peripheral IV 05/26/23 Left Antecubital <1 day          Drain  Duration           Ureteral Internal Stent Right ureter 6 Fr  57 days    Ureteral Internal Stent Left ureter 7 Fr  17 days    Ureteral Internal Stent Right ureter 7 Fr   17 days                Central Line:  Goal for removal: N/A - Chronic PICC           Imaging: Reviewed radiology reports from this admission including: abdominal/pelvic CT  CT abdomen pelvis wo contrast   Final Result by Cem Lomeli MD (05/26 7197)         1  Moderate right and mild to moderate left hydronephrosis with ureteral stent in place  Persistent hydronephrosis raises concern for malfunctioning of the catheters  2  Nonobstructing left renal calculi  3  Persistent wall thickening of the urinary bladder concerning for cystitis  The study was marked in [facility] for immediate notification  Workstation performed: [id number]         IR nephrostomy tube placement    (Results Pending)       EKG and Other Studies Reviewed on Admission:   · EKG: No EKG obtained  ** Please Note: This note has been constructed using a voice recognition system   **

## 2023-05-26 NOTE — PLAN OF CARE
Problem: PAIN - ADULT  Goal: Verbalizes/displays adequate comfort level or baseline comfort level  Description: Interventions:  - Encourage patient to monitor pain and request assistance  - Assess pain using appropriate pain scale  - Administer analgesics based on type and severity of pain and evaluate response  - Implement non-pharmacological measures as appropriate and evaluate response  - Consider cultural and social influences on pain and pain management  - Notify physician/advanced practitioner if interventions unsuccessful or patient reports new pain  Outcome: Progressing     Problem: INFECTION - ADULT  Goal: Absence or prevention of progression during hospitalization  Description: INTERVENTIONS:  - Assess and monitor for signs and symptoms of infection  - Monitor lab/diagnostic results  - Monitor all insertion sites, i e  indwelling lines, tubes, and drains  - Monitor endotracheal if appropriate and nasal secretions for changes in amount and color  - Ada appropriate cooling/warming therapies per order  - Administer medications as ordered  - Instruct and encourage patient and family to use good hand hygiene technique  - Identify and instruct in appropriate isolation precautions for identified infection/condition  Outcome: Progressing  Goal: Absence of fever/infection during neutropenic period  Description: INTERVENTIONS:  - Monitor WBC    Outcome: Progressing     Problem: SAFETY ADULT  Goal: Patient will remain free of falls  Description: INTERVENTIONS:  - Educate patient/family on patient safety including physical limitations  - Instruct patient to call for assistance with activity   - Consult OT/PT to assist with strengthening/mobility   - Keep Call bell within reach  - Keep bed low and locked with side rails adjusted as appropriate  - Keep care items and personal belongings within reach  - Initiate and maintain comfort rounds  - Make Fall Risk Sign visible to staff  - Offer Toileting every 2 Hours, in advance of need  - Initiate/Maintain bed alarm  - Obtain necessary fall risk management equipment:   - Apply yellow socks and bracelet for high fall risk patients  - Consider moving patient to room near nurses station  Outcome: Progressing  Goal: Maintain or return to baseline ADL function  Description: INTERVENTIONS:  -  Assess patient's ability to carry out ADLs; assess patient's baseline for ADL function and identify physical deficits which impact ability to perform ADLs (bathing, care of mouth/teeth, toileting, grooming, dressing, etc )  - Assess/evaluate cause of self-care deficits   - Assess range of motion  - Assess patient's mobility; develop plan if impaired  - Assess patient's need for assistive devices and provide as appropriate  - Encourage maximum independence but intervene and supervise when necessary  - Involve family in performance of ADLs  - Assess for home care needs following discharge   - Consider OT consult to assist with ADL evaluation and planning for discharge  - Provide patient education as appropriate  Outcome: Progressing  Goal: Maintains/Returns to pre admission functional level  Description: INTERVENTIONS:  - Perform BMAT or MOVE assessment daily    - Set and communicate daily mobility goal to care team and patient/family/caregiver  - Collaborate with rehabilitation services on mobility goals if consulted  - Perform Range of Motion 4 times a day  - Reposition patient every 2 hours    - Dangle patient 3 times a day  - Stand patient 3 times a day  - Ambulate patient 3 times a day  - Out of bed to chair 3 times a day   - Out of bed for meals 3 times a day  - Out of bed for toileting  - Record patient progress and toleration of activity level   Outcome: Progressing     Problem: DISCHARGE PLANNING  Goal: Discharge to home or other facility with appropriate resources  Description: INTERVENTIONS:  - Identify barriers to discharge w/patient and caregiver  - Arrange for needed discharge resources and transportation as appropriate  - Identify discharge learning needs (meds, wound care, etc )  - Arrange for interpretive services to assist at discharge as needed  - Refer to Case Management Department for coordinating discharge planning if the patient needs post-hospital services based on physician/advanced practitioner order or complex needs related to functional status, cognitive ability, or social support system  Outcome: Progressing     Problem: Knowledge Deficit  Goal: Patient/family/caregiver demonstrates understanding of disease process, treatment plan, medications, and discharge instructions  Description: Complete learning assessment and assess knowledge base    Interventions:  - Provide teaching at level of understanding  - Provide teaching via preferred learning methods  Outcome: Progressing

## 2023-05-26 NOTE — ASSESSMENT & PLAN NOTE
Recent Labs     05/25/23  0819 05/26/23  1517   CREATININE 3 06* 3 17*   EGFR 15 14     Estimated Creatinine Clearance: 13 6 mL/min (A) (by C-G formula based on SCr of 3 17 mg/dL (H))  With creatinine POA of 3 17  Recently admitted for MONROE in the setting of obstructed ureteral stent, status post stent replacement  Creatinine at discharge on 5/13/2022 was 2 47, now as high as 3 17  Per her urologist, this is likely in the setting of recurrent ureteral stent obstruction  Was sent in to the ED today for imaging and bilateral nephrostomy tube placement with IR   CT of the abdomen and pelvis demonstrated moderate right and mild to moderate left hydronephrosis with ureteral stents in place  Persistent wall thickening of bladder concerning for cystitis which I suspect is likely in the setting of radiation  Was also being worked up by outpatient nephrologist for this and was going to undergo kidney biopsy if no improvement after ureteral stent exchange after last admission  Per Dr Stephanie Mujica this may also be due to glomerulopathy vs side effect of ENDOBARR treatment  Plan:  Plan to undergo bilateral nephrostomy tube placement with IR tomorrow  We will continue monitoring renal indices  Consider nephrology consult if creatinine remains unchanged or continues trending up after procedure  Avoid nephrotoxic agents    Avoid hypotension

## 2023-05-26 NOTE — ED PROVIDER NOTES
History  Chief Complaint   Patient presents with   • Flank Pain     Pt states she has stents and that theyre not working  Pt states her creatinine was high and was instructed to come for nephrostomy tubes  82-year-old female with past medical history of hydronephrosis secondary to endometrial cancer presents today from urology office for admission for percutaneous nephrostomy tube placement  Patient has history of kidney disease following chemo and radiation for endometrial cancer  Patient had nephrostomy tubes placed in the past however they were recently removed  Urology tried ureteral stents however this did not relieve the obstruction so she will again require placement of nephrostomy tubes  Per urology notes, they sent patient to ED for non-contrast CT and admission for PCN insertion given worsening renal function and persistent hydronephrosis despite recent ureteral stent exchange  Patient reports intermittent achy right lower back pain over the past few days  Denies any fevers, chills, chest pain, shortness of breath, abdominal pain, urinary symptoms including dysuria, frequency, urgency, hematuria  Flank Pain  Associated symptoms: no chest pain, no chills, no cough, no dysuria, no fever, no hematuria, no shortness of breath, no sore throat and no vomiting        Prior to Admission Medications   Prescriptions Last Dose Informant Patient Reported? Taking? ARIPiprazole (ABILIFY) 20 MG tablet  Self Yes No   Sig: Take 20 mg by mouth in the morning     Calcium-Magnesium-Vitamin D (CALCIUM 500 PO)  Self Yes No   Sig: Take by mouth daily    Cranberry-Vitamin C 250-60 MG CAPS  Self No No   Sig: Take 1 tablet by mouth in the morning   Cyanocobalamin (VITAMIN B12 PO)  Self Yes No   Sig: Take by mouth daily    Gemtesa 75 MG TABS  Self No No   Sig: TAKE 75 MG BY MOUTH IN THE MORNING   Multiple Vitamin (MULTIVITAMIN) tablet  Self Yes No   Sig: Take 1 tablet by mouth daily   aspirin (ECOTRIN LOW STRENGTH) 81 mg EC tablet  Self Yes No   Sig: Take 81 mg by mouth every evening   cholecalciferol (VITAMIN D3) 1,000 units tablet  Self No No   Sig: Take 4 tablets (4,000 Units total) by mouth daily   dronabinol (MARINOL) 2 5 mg capsule  Self No No   Sig: Take 1 capsule (2 5 mg total) by mouth 2 (two) times a day before meals   enoxaparin (LOVENOX) 80 mg/0 8 mL   No No   Sig: Inject 0 8 mL (80 mg total) under the skin every 24 hours   folic acid (FOLVITE) 1 mg tablet  Self No No   Sig: Take 1 tablet (1 mg total) by mouth daily   naloxone (NARCAN) 4 mg/0 1 mL nasal spray  Self No No   Sig: Administer 1 spray into a nostril  If no response after 2-3 minutes, give another dose in the other nostril using a new spray     omeprazole (PriLOSEC) 20 mg delayed release capsule  Self Yes No   Sig: Take 20 mg by mouth daily   oxybutynin (DITROPAN XL) 15 MG 24 hr tablet  Self No No   Sig: Take 1 tablet (15 mg total) by mouth daily at bedtime   saccharomyces boulardii (FLORASTOR) 250 mg capsule  Self No No   Sig: Take 1 capsule (250 mg total) by mouth 2 (two) times a day   senna (SENOKOT) 8 6 MG tablet  Self No No   Sig: Take 1 tablet (8 6 mg total) by mouth 2 (two) times a day as needed for constipation   sodium bicarbonate 650 mg tablet   No No   Sig: Take 1 tablet (650 mg total) by mouth 3 (three) times a day   venlafaxine (EFFEXOR) 75 mg tablet  Self Yes No   Sig: Take 75 mg by mouth 3 (three) times a day        Facility-Administered Medications: None       Past Medical History:   Diagnosis Date   • Anxiety    • CKD (chronic kidney disease) stage 3, GFR 30-59 ml/min ([facility])    • COVID     Fall 2022   • Depression    • [facility]2017   • GERD (gastroesophageal reflux disease)    • H/O gastric bypass    • Hard to intubate     pt denies   • History of chemotherapy     started 12/2021endometrial cancer- done 12/23/22   • History of DVT in adulthood     RLE   • Hyperglycemia     vx type 2 dm -- last assessed 4/1/14; resolved 11/7/17   • Hyperlipidemia    • Hypertension     in past- no meds at present   • Iron deficiency anemia     iron infusions weekly   • OAB (overactive bladder)    • Port-A-Cath in place    • Wears glasses        Past Surgical History:   Procedure Laterality Date   • ABDOMINAL SURGERY      GASTRIC BYPASS; 2001   • BREAST BIOPSY Right 06/28/2019    core biopsy; benign   • CHOLECYSTECTOMY      at the time of gastric bypass   • COLONOSCOPY     • CT NEEDLE BIOPSY LYMPH NODE  07/08/2019   • FL GUIDED CENTRAL VENOUS ACCESS DEVICE INSERTION  11/12/2019   • FL RETROGRADE PYELOGRAM  3/30/2023   • FL RETROGRADE PYELOGRAM  5/9/2023   • GASTRIC BYPASS     • HYSTERECTOMY Bilateral 2017    total abdominal with salpingo-oophorectomy   • IR NEPHROSTOMY TO NEPHROURETERAL STENT  06/09/2022   • IR NEPHROSTOMY TO NEPHROURETERAL STENT  12/20/2022   • IR NEPHROSTOMY TUBE CHECK/CHANGE/REPOSITION/REINSERTION/UPSIZE  05/27/2022   • IR NEPHROSTOMY TUBE PLACEMENT  07/26/2019   • IR NEPHROURETERAL STENT CHECK/CHANGE/REPOSITION  04/06/2021   • IR NEPHROURETERAL STENT CHECK/CHANGE/REPOSITION  06/04/2021   • IR NEPHROURETERAL STENT CHECK/CHANGE/REPOSITION  09/03/2021   • IR NEPHROURETERAL STENT CHECK/CHANGE/REPOSITION  12/07/2021   • IR NEPHROURETERAL STENT CHECK/CHANGE/REPOSITION  03/08/2022   • IR NEPHROURETERAL STENT CHECK/CHANGE/REPOSITION  05/10/2022   • IR NEPHROURETERAL STENT CHECK/CHANGE/REPOSITION  09/19/2022   • IR PICC LINE  09/27/2019   • IR PORT PLACEMENT  07/26/2019   • IR PORT REMOVAL  09/20/2019   • OOPHORECTOMY Bilateral 2017   • VT CYSTO BLADDER W/URETERAL CATHETERIZATION Right 3/30/2023    Procedure: CYSTOSCOPY RETROGRADE PYELOGRAM WITH exchange of STENT URETERAL;  Surgeon: Suzanne Call MD;  [facility];  Service: Urology   • VT CYSTO BLADDER W/URETERAL CATHETERIZATION Bilateral 5/9/2023    Procedure: CYSTOSCOPY, BILATERAL RETROGRADE PYELOGRAM, WITH LEFT INSERTION STENT URETERAL, RIGHT Maryann Clore EXCHANGE;  Surgeon: Bhargavi Mtz MD;  [facility];  Service: Urology   • MO CYSTO W/INSERT URETERAL STENT Right 3/30/2023    Procedure: EXCHANGE STENT URETERAL;  Surgeon: Sotero Christopher MD;  [facility];  Service: Urology   • MO INSJ TUNNELED CTR VAD W/SUBQ PORT AGE 5 YR/> Left 11/12/2019    Procedure: INSERTION VENOUS PORT ( PORT-A-CATH) IR;  Surgeon: Ricky Lara DO;  [facility];  Service: Interventional Radiology   • MO LAPS ABD PRTM&OMENTUM DX W/WO [facility] N/A 12/19/2017    Procedure: LAPAROSCOPY DIAGNOSTIC;  Surgeon: Edwige Pickett MD;  [facility];  Service: Gynecology Oncology   • MO LAPS W/RAD HYST W/BILAT LMPHADEC RMVL TUBE/OVARY N/A 12/19/2017    Procedure: HYSTERECTOMY LAPAROSCOPIC TOTAL ([address]) W/ ROBOTICS; BILATERAL SALPINGOOPHERECTOMY; LYMPH NODE DISSECTION; lysis of adhesions;  Surgeon: Edwige Pickett MD;  [facility];  Service: Gynecology Oncology   • TONSILLECTOMY     • US GUIDED BREAST BIOPSY RIGHT COMPLETE Right 06/28/2019       Family History   Problem Relation Age of Onset   • Hyperlipidemia Mother    • Heart disease Mother    • Ovarian cancer Mother 48   • Colon cancer Mother    • Lymphoma Father    • Breast cancer Sister 61   • No Known Problems Brother    • No Known Problems Brother    • No Known Problems Maternal Grandmother    • Bone cancer Maternal Grandfather    • Uterine cancer Paternal Grandmother    • No Known Problems Paternal Grandfather    • No Known Problems Maternal Aunt    • No Known Problems Maternal Aunt    • No Known Problems Maternal Aunt    • No Known Problems Maternal Aunt    • No Known Problems Paternal Aunt    • No Known Problems Paternal Aunt    • No Known Problems Paternal Aunt    • No Known Problems Paternal Aunt      I have reviewed and agree with the history as documented      E-Cigarette/Vaping   • E-Cigarette Use Never User      E-Cigarette/Vaping Substances   • Nicotine No    • THC No    • CBD No    • Flavoring No    • Other No    • Unknown No      Social History     Tobacco Use   • Smoking status: Never   • Smokeless tobacco: Never   Vaping Use   • Vaping Use: Never used   Substance Use Topics   • Alcohol use: Not Currently   • Drug use: No        Review of Systems   Constitutional: Negative for chills and fever  HENT: Negative for ear pain and sore throat  Eyes: Negative for pain and visual disturbance  Respiratory: Negative for cough and shortness of breath  Cardiovascular: Negative for chest pain and palpitations  Gastrointestinal: Negative for abdominal pain and vomiting  Genitourinary: Positive for flank pain  Negative for dysuria and hematuria  Musculoskeletal: Negative for arthralgias and back pain  Skin: Negative for color change and rash  Neurological: Negative for seizures and syncope  All other systems reviewed and are negative  Physical Exam  ED Triage Vitals   Temperature Pulse Respirations Blood Pressure SpO2   05/26/23 1437 05/26/23 1435 05/26/23 1435 05/26/23 1435 05/26/23 1435   98 °F (36 7 °C) 74 14 98/52 100 %      Temp Source Heart Rate Source Patient Position - Orthostatic VS BP Location FiO2 (%)   05/26/23 1437 05/26/23 1435 05/26/23 1435 05/26/23 1435 --   Oral Monitor Sitting Right arm       Pain Score       05/26/23 1518       2             Orthostatic Vital Signs  Vitals:    05/26/23 1435 05/26/23 1518   BP: 98/52 108/51   Pulse: 74 84   Patient Position - Orthostatic VS: Sitting Sitting       Physical Exam  Vitals and nursing note reviewed  Constitutional:       General: She is not in acute distress  Appearance: Normal appearance  She is well-developed  She is not ill-appearing, toxic-appearing or diaphoretic  HENT:      Head: Normocephalic and atraumatic  Eyes:      Conjunctiva/sclera: Conjunctivae normal    Cardiovascular:      Rate and Rhythm: Normal rate and regular rhythm  Heart sounds: No murmur heard    Pulmonary:      Effort: Pulmonary effort is normal  No respiratory distress  Breath sounds: Normal breath sounds  Abdominal:      Palpations: Abdomen is soft  Tenderness: There is no abdominal tenderness  There is right CVA tenderness  There is no left CVA tenderness  Musculoskeletal:         General: No swelling  Cervical back: Neck supple  Skin:     General: Skin is warm and dry  Capillary Refill: Capillary refill takes less than 2 seconds  Neurological:      Mental Status: She is alert  Psychiatric:         Mood and Affect: Mood normal          ED Medications  Medications - No data to display    Diagnostic Studies  Results Reviewed     Procedure Component Value Units Date/Time    CBC and differential [[id number]]  (Abnormal) Collected: 05/26/23 1517    Lab Status: Final result Specimen: Blood from Arm, Left Updated: 05/26/23 1540     WBC 8 98 Thousand/uL      RBC 2 84 Million/uL      Hemoglobin 8 0 g/dL      Hematocrit 26 8 %      MCV 94 fL      MCH 28 2 pg      MCHC 29 9 g/dL      RDW 14 7 %      MPV 9 4 fL      Platelets 521 Thousands/uL      nRBC 0 /100 WBCs      Neutrophils Relative 80 %      Immat GRANS % 2 %      Lymphocytes Relative 8 %      Monocytes Relative 8 %      Eosinophils Relative 2 %      Basophils Relative 0 %      Neutrophils Absolute 7 18 Thousands/µL      Immature Grans Absolute 0 14 Thousand/uL      Lymphocytes Absolute 0 72 Thousands/µL      Monocytes Absolute 0 70 Thousand/µL      Eosinophils Absolute 0 20 Thousand/µL      Basophils Absolute 0 04 Thousands/µL     UA w Reflex to Microscopic w Reflex to Culture [[id number]]     Lab Status: No result Specimen: Urine     Comprehensive metabolic panel [[id number]] Collected: 05/26/23 1517    Lab Status: In process Specimen: Blood from Arm, Left Updated: 05/26/23 1529                 CT abdomen pelvis wo contrast   Final Result by Janice Dhillon MD (05/26 1530)         1   Moderate right and mild to moderate left hydronephrosis with ureteral stent in place  Persistent hydronephrosis raises concern for malfunctioning of the catheters  2  Nonobstructing left renal calculi  3  Persistent wall thickening of the urinary bladder concerning for cystitis  The study was marked in [facility] for immediate notification  Workstation performed: [id number]               Procedures  Procedures      ED Course  ED Course as of 05/30/23 1746   Fri May 26, 2023   1541 Hemoglobin(!): 8 0  Baseline   1552 Urology placed consult for IR for percutaneous nephrostomy tube placement                                        Medical Decision Making  74-year-old female with past medical history of hydronephrosis secondary to endometrial cancer presents today from urology office for admission for percutaneous nephrostomy tube placement  DDx including but not limited to: hydronephrosis, obstruction, renal colic, pyelonephritis, UTI  CT confirmed hydronephrosis  Patient will require admission for further management  Amount and/or Complexity of Data Reviewed  Labs: ordered  Decision-making details documented in ED Course  Radiology: ordered  Risk  Decision regarding hospitalization  Disposition  Final diagnoses:   Hydronephrosis     Time reflects when diagnosis was documented in both MDM as applicable and the Disposition within this note     Time User Action Codes Description Comment    5/26/2023  3:44 PM [place] An Add [N13 [id number]] Hydronephrosis       ED Disposition     ED Disposition   Admit    Condition   Stable    Date/Time   Fri May 26, 2023  3:44 PM    Comment   Case was discussed with DAVID and the patient's admission status was agreed to be Admission Status: inpatient status to the service of Dr Riley Stewart   Follow-up Information    None         Patient's Medications   Discharge Prescriptions    No medications on file     No discharge procedures on file      PDMP Review       Value Time User    PDMP Reviewed  Yes 8/26/2022  4:42 PM Janelle Cordoba PA-C           ED Provider  Attending physically available and evaluated Ragini Melendez I managed the patient along with the ED Attending      Electronically Signed by         Shirlene Goltz, MD  05/30/23 5372

## 2023-05-26 NOTE — CONSULTS
Consult - Urology   Ragini Al 1955, 74 y o  female MRN: [id number]    Unit/Bed#: W -01 Encounter: [id number]    Assessment & Plan:  1  Bilateral hydronephrosis  2  S/p bilateral ureteral stents  - Martin Momin is a 29-year-old female history of CKD, endometrial cancer recent hospitalization on 5/7/2023, who underwent bilateral ureteral stent placement due to bilateral hydronephrosis  Patient had improvement in her renal function and was discharged 5/14/2023   - Patient underwent ultrasound as an outpatient showing persistent bilateral hydronephrosis  Repeat renal function panel creatinine 3 15  - CT showing Moderate right and mild to moderate left hydronephrosis with ureteral stent in place    - Previous baseline creatinine 1 1  Cr on discharge 5/13 2 47  Creatinine today 3 17  - Vital signs stable, afebrile  - No leukocytosis  - UA to be collected  - Patient reports mild right flank pain  - Due to persistent bilateral hydronephrosis and worsening renal function, recommend bilateral PCN placement with IR  - IR consulted  - Urology will follow    Subjective: Ana Maria Jiménez is a 29-year-old female with history of CKD, endometrial cancer status post radiation, with recent hospitalization on 5/7/2023 due to worsening renal function and metabolic acidosis  During prior admission patient underwent bilateral ureteral stent placement and had improvement in renal function  Patient underwent repeat ultrasound as an outpatient showing persistent bilateral hydronephrosis despite bilateral ureteral stents in place  Patient with worsening renal function  Patient was referred to the ED by Dr hSy John for CT scan and recommending nephrostomy tube placement with IR  Patient reports mild right flank pain over the past 3 days  Denies any urinary complaints including dysuria, frequency, urgency    CT scan obtained on admission showing moderate right hydronephrosis and mild to moderate left hydronephrosis, with appropriate position of bilateral ureteral stents  Urology was consulted for further management recommendations  Review of Systems   Constitutional: Negative for chills and fever  Respiratory: Negative for cough and shortness of breath  Cardiovascular: Negative for chest pain and palpitations  Gastrointestinal: Negative for abdominal pain and vomiting  Genitourinary: Negative for dysuria and hematuria  Musculoskeletal: Negative for arthralgias and back pain  Skin: Negative for color change and rash  Neurological: Negative for seizures and syncope  All other systems reviewed and are negative  Objective:  Vitals: Blood pressure 98/53, pulse 80, temperature 98 °F (36 7 °C), temperature source Oral, resp  rate 16, SpO2 97 %, not currently breastfeeding  ,There is no height or weight on file to calculate BMI  Physical Exam  Vitals reviewed  Constitutional:       General: She is not in acute distress  Appearance: Normal appearance  She is normal weight  She is not ill-appearing, toxic-appearing or diaphoretic  Comments: Frail   HENT:      Head: Normocephalic and atraumatic  Right Ear: External ear normal       Left Ear: External ear normal       Nose: Nose normal       Mouth/Throat:      Mouth: Mucous membranes are moist    Eyes:      General: No scleral icterus  Conjunctiva/sclera: Conjunctivae normal    Cardiovascular:      Rate and Rhythm: Normal rate  Pulses: Normal pulses  Pulmonary:      Effort: Pulmonary effort is normal    Abdominal:      General: Abdomen is flat  There is no distension  Palpations: Abdomen is soft  Tenderness: There is no abdominal tenderness  There is no right CVA tenderness, left CVA tenderness or guarding  Musculoskeletal:         General: Normal range of motion  Cervical back: Normal range of motion  Skin:     General: Skin is warm  Findings: No rash  Neurological:      General: No focal deficit present  Mental Status: She is alert and oriented to person, place, and time  Mental status is at baseline  Psychiatric:         Mood and Affect: Mood normal          Behavior: Behavior normal          Thought Content: Thought content normal          Judgment: Judgment normal          Imaging:    CT ABDOMEN AND PELVIS WITHOUT IV CONTRAST     INDICATION:   Hydronephrosis  hydronephrosis, sent in by urology      COMPARISON: CT abdomen and pelvis May 4, 2023, renal ultrasound May 19, 2023     TECHNIQUE:  CT examination of the abdomen and pelvis was performed without intravenous contrast  Multiplanar 2D reformatted images were created from the source data      This examination, like all CT scans performed in the [place], was performed utilizing techniques to minimize radiation dose exposure, including the use of iterative reconstruction and automated exposure control  Radiation dose length   product (DLP) for this visit:  444 mGy-cm     Absence of intravenous and oral contrast decreases the sensitivity of the exam         FINDINGS:     ABDOMEN     LOWER CHEST:  No clinically significant abnormality identified in the visualized lower chest      LIVER/BILIARY TREE:  Unremarkable      GALLBLADDER:  Gallbladder is surgically absent      SPLEEN:  Unremarkable      PANCREAS:  Unremarkable      ADRENAL GLANDS: There is low density lobulated thickening of adrenal glands bilaterally statistically most likely to represent adenomatous hyperplasia      KIDNEYS/URETERS: Mild to moderate left hydronephrosis is demonstrated with left ureteral stent  4 mm calculus is seen in the lower pole of the left kidney  Punctate calculi lower pole left kidney  There is moderate to severe right hydronephrosis  Abnormal appearance of the right kidney is demonstrated with atrophy and cortical thinning  Simple cyst upper pole measures 3 cm  Right ureteral stent is noted      STOMACH AND BOWEL: Gastric bypass surgery    Small bowel loops are grossly within normal limits  There is large amount of stool seen throughout the colon      APPENDIX:  No findings to suggest appendicitis      ABDOMINOPELVIC CAVITY:  No ascites  No pneumoperitoneum  There is no interval change in calcified aortocaval node measuring 1 5 cm series 301/72     VESSELS:  Atherosclerotic changes are present  No evidence of aneurysm      PELVIS     REPRODUCTIVE ORGANS: Hysterectomy and bilateral salpingo-oophorectomy      URINARY BLADDER: Moderate to severe wall thickening of the urinary bladder is seen concerning for the presence of cystitis  This is asymmetric and more severe on the left      ABDOMINAL WALL/INGUINAL REGIONS: Collateral vessels are seen in the anterior abdominal wall as well as multiple areas of fat necrosis  Multiple foci of air are also seen in the anterior abdominal wall likely secondary to subcutaneous injections      OSSEOUS STRUCTURES: Lumbar spondylosis with old mild superior endplate compression fracture at L5      IMPRESSION:        1  Moderate right and mild to moderate left hydronephrosis with ureteral stent in place  Persistent hydronephrosis raises concern for malfunctioning of the catheters  2  Nonobstructing left renal calculi  3  Persistent wall thickening of the urinary bladder concerning for cystitis    The study was marked in [facility] for immediate notification         Workstation performed: [id number]       Labs:  Recent Labs     05/26/23  1517   WBC 8 98     Recent Labs     05/26/23  1517   HGB 8 0*     Recent Labs     05/25/23  0819 05/26/23  1517   CREATININE 3 06* 3 17*     History:  Social History     Socioeconomic History   • Marital status: Single     Spouse name: None   • Number of children: None   • Years of education: None   • Highest education level: None   Occupational History   • None   Tobacco Use   • Smoking status: Never   • Smokeless tobacco: Never   Vaping Use   • Vaping Use: Never used   Substance and Sexual Activity   • Alcohol use: Not Currently   • Drug use: No   • Sexual activity: Not Currently   Other Topics Concern   • None   Social History Narrative   • None     Social Determinants of Health     Financial Resource Strain: Not on file   Food Insecurity: No Food Insecurity (5/9/2023)    Hunger Vital Sign    • Worried About Running Out of Food in the Last Year: Never true    • Ran Out of Food in the Last Year: Never true   Transportation Needs: No Transportation Needs (5/9/2023)    PRAPARE - Transportation    • Lack of Transportation (Medical): No    • Lack of Transportation (Non-Medical):  No   Physical Activity: Not on file   Stress: Not on file   Social Connections: Not on file   Intimate Partner Violence: Not on file   Housing Stability: Unknown (5/9/2023)    Housing Stability Vital Sign    • Unable to Pay for Housing in the Last Year: No    • Number of Places Lived in the Last Year: Not on file    • Unstable Housing in the Last Year: No     Past Medical History:   Diagnosis Date   • Anxiety    • CKD (chronic kidney disease) stage 3, GFR 30-59 ml/min ([facility] [address] )    • COVID     Fall 2022   • Depression    • Endometrial cancer ([facility] [address] ) 12/2017   • GERD (gastroesophageal reflux disease)    • H/O gastric bypass    • Hard to intubate     pt denies   • History of chemotherapy     started 12/2021endometrial cancer- done 12/23/22   • History of DVT in adulthood     RLE   • Hyperglycemia     vx type 2 dm -- last assessed 4/1/14; resolved 11/7/17   • Hyperlipidemia    • Hypertension     in past- no meds at present   • Iron deficiency anemia     iron infusions weekly   • OAB (overactive bladder)    • Port-A-Cath in place    • Wears glasses      Past Surgical History:   Procedure Laterality Date   • ABDOMINAL SURGERY      GASTRIC BYPASS; 2001   • BREAST BIOPSY Right 06/28/2019    core biopsy; benign   • CHOLECYSTECTOMY      at the time of gastric bypass   • COLONOSCOPY     • CT NEEDLE BIOPSY LYMPH NODE  07/08/2019   • FL GUIDED CENTRAL VENOUS ACCESS DEVICE INSERTION  11/12/2019   • FL RETROGRADE PYELOGRAM  3/30/2023   • FL RETROGRADE PYELOGRAM  5/9/2023   • GASTRIC BYPASS     • HYSTERECTOMY Bilateral 2017    total abdominal with salpingo-oophorectomy   • IR NEPHROSTOMY TO NEPHROURETERAL STENT  06/09/2022   • IR NEPHROSTOMY TO NEPHROURETERAL STENT  12/20/2022   • IR NEPHROSTOMY TUBE CHECK/CHANGE/REPOSITION/REINSERTION/UPSIZE  05/27/2022   • IR NEPHROSTOMY TUBE PLACEMENT  07/26/2019   • IR NEPHROURETERAL STENT CHECK/CHANGE/REPOSITION  04/06/2021   • IR NEPHROURETERAL STENT CHECK/CHANGE/REPOSITION  06/04/2021   • IR NEPHROURETERAL STENT CHECK/CHANGE/REPOSITION  09/03/2021   • IR NEPHROURETERAL STENT CHECK/CHANGE/REPOSITION  12/07/2021   • IR NEPHROURETERAL STENT CHECK/CHANGE/REPOSITION  03/08/2022   • IR NEPHROURETERAL STENT CHECK/CHANGE/REPOSITION  05/10/2022   • IR NEPHROURETERAL STENT CHECK/CHANGE/REPOSITION  09/19/2022   • IR PICC LINE  09/27/2019   • IR PORT PLACEMENT  07/26/2019   • IR PORT REMOVAL  09/20/2019   • OOPHORECTOMY Bilateral 2017   • KY CYSTO BLADDER W/URETERAL CATHETERIZATION Right 3/30/2023    Procedure: CYSTOSCOPY RETROGRADE PYELOGRAM WITH exchange of STENT URETERAL;  Surgeon: Jamilah Pittman MD;  [facility];  Service: Urology   • KY CYSTO BLADDER W/URETERAL CATHETERIZATION Bilateral 5/9/2023    Procedure: CYSTOSCOPY, BILATERAL RETROGRADE PYELOGRAM, WITH LEFT INSERTION STENT URETERAL, RIGHT URTERAL STENT EXCHANGE;  Surgeon: Tamie Wells MD;  [facility];  Service: Urology   • KY CYSTO W/INSERT URETERAL STENT Right 3/30/2023    Procedure: EXCHANGE STENT URETERAL;  Surgeon: Jamilah Pittman MD;  [facility];  Service: Urology   • KY INSJ TUNNELED CTR VAD W/SUBQ PORT AGE 5 YR/> Left 11/12/2019    Procedure: INSERTION VENOUS PORT ( PORT-A-CATH) IR;  Surgeon: Truong Ortiz DO;  [facility];  Service: Interventional Radiology   • KY LAPS ABD PRTM&OMENTUM DX W/WO SPEC BR/WA SPX N/A 12/19/2017    Procedure: LAPAROSCOPY DIAGNOSTIC;  Surgeon: Dolores Choi MD;  [facility];  Service: Gynecology Oncology   • [place] LAPS W/RAD HYST W/BILAT LMPHADEC RMVL TUBE/OVARY N/A 12/19/2017    Procedure: HYSTERECTOMY LAPAROSCOPIC TOTAL ([address]) W/ ROBOTICS; BILATERAL SALPINGOOPHERECTOMY; LYMPH NODE DISSECTION; lysis of adhesions;  Surgeon: Dolores Choi MD;  [facility];  Service: Gynecology Oncology   • TONSILLECTOMY     • US GUIDED BREAST BIOPSY RIGHT COMPLETE Right 06/28/2019     Family History   Problem Relation Age of Onset   • Hyperlipidemia Mother    • Heart disease Mother    • Ovarian cancer Mother 48   • Colon cancer Mother    • Lymphoma Father    • Breast cancer Sister 61   • No Known Problems Brother    • No Known Problems Brother    • No Known Problems Maternal Grandmother    • Bone cancer Maternal Grandfather    • Uterine cancer Paternal Grandmother    • No Known Problems Paternal Grandfather    • No Known Problems Maternal Aunt    • No Known Problems Maternal Aunt    • No Known Problems Maternal Aunt    • No Known Problems Maternal Aunt    • No Known Problems Paternal Aunt    • No Known Problems Paternal Aunt    • No Known Problems Paternal Aunt    • No Known Problems Paternal Aunt        Cyndi Shah, [place]  Date: 5/26/2023 Time: 4:13 PM

## 2023-05-26 NOTE — ED ATTENDING ATTESTATION
5/26/2023  IKassandra MD, saw and evaluated the patient  I have discussed the patient with the resident/non-physician practitioner and agree with the resident's/non-physician practitioner's findings, Plan of Care, and MDM as documented in the resident's/non-physician practitioner's note, except where noted  All available labs and Radiology studies were reviewed  I was present for key portions of any procedure(s) performed by the resident/non-physician practitioner and I was immediately available to provide assistance  At this point I agree with the current assessment done in the Emergency Department  I have conducted an independent evaluation of this patient a history and physical is as follows:    73 y/o female with known hydronephrosis with bilateral stents in place presenting for increasing creatinine with concern for stent malfunction  Was sent in by urology for labs, CT scan, and admission for nephrostomy tube placement  Patient reports very mild right flank pain, is otherwise asymptomatic  No fevers or chills  Urinating without difficulty  Physical Exam  Constitutional:       General: She is not in acute distress  Appearance: She is well-developed  She is not diaphoretic  HENT:      Head: Normocephalic and atraumatic  Right Ear: External ear normal       Left Ear: External ear normal       Nose: Nose normal    Eyes:      Conjunctiva/sclera: Conjunctivae normal    Cardiovascular:      Rate and Rhythm: Normal rate and regular rhythm  Heart sounds: Normal heart sounds  No murmur heard  No friction rub  No gallop  Pulmonary:      Effort: Pulmonary effort is normal  No respiratory distress  Breath sounds: Normal breath sounds  No wheezing or rales  Abdominal:      General: Bowel sounds are normal  There is no distension  Palpations: Abdomen is soft  Tenderness: There is no abdominal tenderness  There is right CVA tenderness   There is no left CVA tenderness or guarding  Musculoskeletal:         General: No deformity  Normal range of motion  Cervical back: Normal range of motion and neck supple  Right lower leg: No edema  Left lower leg: No edema  Skin:     General: Skin is warm and dry  Neurological:      Mental Status: She is alert and oriented to person, place, and time  Motor: No abnormal muscle tone  Psychiatric:         Mood and Affect: Mood normal            ED Course  ED Course as of 05/26/23 1846   Fri May 26, 2023   1552 CT scan with moderate right and mild to moderate left hydronephrosis with stents in place raising concern for stent malfunction as well as cystitis  No leukocytosis  Creatinine 3 17 from 2 47 on 3/15  Patient will be admitted to [facility] for nephrostomy tube placement per urology's recommendation            Critical Care Time  Procedures

## 2023-05-26 NOTE — TELEPHONE ENCOUNTER
ICU - DAILY PROGRESS NOTE: 1/3/2018 8:33 AM      Previous 24 Hours  Patient remained on the ventilator. Started on trial of Mestinon. Still has Very low negative inspiratory force (-16cm) spontaneous tidal volume is in 450 cc on high pressure support of 16 CM H20. Discussed with the patient's son at bedside about patient's condition and my suspicion of aspiration pneumonia and low muscle strength, BAL results and possibility of tracheostomy if not able to extubate in 2 week, until then we will continue to work hard to liberate him from the ventilator.    Hospital Course:  Hospital Day: 4      Current Infusions:  Patient is currently on following IV medication-  • dextrose 5 % infusion     • norepinephrine (LEVOPHED) 4 mg in sodium chloride 0.9% 250 mL infusion Stopped (01/03/18 0824)   • DilTIAZem HCl 125 mg in sodium chloride 0.9 % 50 mL total volume infusion 5 mg/hr (01/03/18 0510)   • propofol (DIPRIVAN) infusion 10 mcg/kg/min (01/03/18 0683)         PLAN:     After examining patient and reviewing chart, Labs, radiology and taking the history in my opinion plan is as follows.     Neurologic:   Patient is doing acceptably well. Patient is awake alert following commands off sedation. CT head did not demonstrate any acute intracranial pathology. we'll continue to monitor patient neurological status. Neurology team was consulted to help us differentiate patient's hypercapnia due to neuromuscular problem versus underlying infection and COPD. patient was started on trial of Mestinon 1/2/2018.    Cardiovascular:   Patient is doing acceptably well. Patient is off norepinephrine drip.  Most recent BP is as follows   BP Readings from Last 1 Encounters:   01/03/18 154/83     Patient is hemodynamically controlled. I will use Combination of crystalloid and colloids. Patient echocardiogram showed right-sided failure. Agnes was d/c ed in AM today. Off diltiazem on low-dose beta blocker.    Respiratory:   Patient is not doing  Patient with bilateral radiation-induced strictures of the ureters  He has been managed by my colleague Dr Rosio Goodman  I managed her during acute hospitalization and recently exchanged her stents  I was contacted by her nephrologist that her renal function has continued to worsen and an ultrasound demonstrates persistent hydronephrosis    I  believe the patient needs to be hospitalized and have nephrostomy tubes placed  I have asked our advanced practitioner who recently saw the patient in the office to contact her with our recommendations  I would recommend she has a noncontrast CT scan initially followed by unilateral, potentially bilateral, nephrostomy tube placement      CC Dr Poli Gomez acceptably well. Patient is on the ventilator with following vent settings.   Vent Settings Last Value   FiO2 35 % (01/03/18 0826)   Mode PS/CPAP (01/03/18 0826)   Rate 26 (01/03/18 0729)   Tidal Volume 480 mL (01/03/18 0729)   Pressure Support (S) 16 cm H20 (01/03/18 0826)   PEEP/CPAC/EPAP 5 cm H20 (01/03/18 0826)       VENT COMPLIANCE DATA LAST VALUE   PIP Observed 26 cm H2O   Plateau Pressure 21 cm H2O   Mean Airway Pressure 10 cmH20   Auto PEEP 9 cm H2O   Et Co2 mmHg 50 mmHg     WEANING PARAMETERS LAST VALUE   Spontaneous Minute Volume 6.8   Respiratory Rate (Spontaneous) 19 br/min   Spontaneous vt (L) 0.36 L   Vital Capacity (L) 0.57 L/min   Maximum Inspiratory Pressure (CM) -16 cmH2O   Total RSBI. Rapid Shallow Breathing 52.78       Patient lastest ABG is as follows     ABG  Lab Results   Component Value Date    APH 7.37 01/03/2018    APO2 128 (H) 01/03/2018    FIO2 35 01/02/2018    AHCO3 33 (H) 01/03/2018    APCO2 60 (H) 01/03/2018        Chest x-ray did show   LAST CXR:    Results for orders placed during the hospital encounter of 12/29/17   XR Chest AP or PA    Impression IMPRESSION:    Stable appearance of mild probable CHF/volume overload changes.                  Most of the patient's current problems can be explained based on the patient's underlying lung disease, aspiration pneumonia, which might be's compounded by the fact that patient has low negative inspiratory force (underlying neuromuscular problem could be contributing to it.) I talked with Dr. Allen from neurology started on a trail of Mestinon.    Patients last Sputum Culture is as follows.  CULTURE (no units)   Date Value   01/02/2018 PENDING        CT chest is showing mostly airspace densities wanting towards aspiration pneumonia. BAL cultures are pending. Ventilator management. Adjusted per patient needs. Continue ventilatory support with current management strategy as ordered. Secretion clearance management as needed. CPAP PS trial with high  Pressure support.    Gastrointestinal:   Patient is doing acceptably well. Patient was started on early tube feeds in order to improve his nutrition and pt is on GI prophylaxis. Nutritional Parameters    Recent Labs  Lab 01/03/18  0405  12/30/17  0307   CHOLESTEROL  --   --  136   TRIGLYCERIDE 74  --  36   HDL  --   --  66   CALCLDL  --   --  63   ALBUMIN 2.8*  < >  --    < > = values in this interval not displayed.  OCURENCE OF BOWEL MOVEMENT:       Heme/Onc:   Patient is doing acceptably well. I will continue to monitor patient's hemoglobin and hematocrit last H/H   Recent Labs      01/01/18   0948  01/02/18   0500  01/03/18   0405   WBC  3.4*  15.2*  16.1*   HGB  10.1*  10.9*  11.3*   HCT  33.1*  35.6*  36.6*   PLT  89*  132*  140       No results for input(s): INR, PT, PTT in the last 72 hours.     and follow coags.     Endocrine:   Patient is  doing acceptably well.   Glucose (mg/dL)   Date Value   01/03/2018 228 (H)   I will instruct nursing staff to start insulin drip to keep blood sugar less than 200.    Infectious Disease:   Patient is not doing acceptably well.     Cultures are as follows:    Respiratory Culture and Smear   CULTURE (no units)   Date Value   01/02/2018 PENDING      LAST URINE CULTURE:  Results for orders placed or performed during the hospital encounter of 12/29/17   Urine Culture   Result Value Ref Range    Specimen Description URINE, CATHETERIZED, BLADDER     CULTURE NO GROWTH 2 DAYS.     REPORT STATUS 01/02/2018 FINAL        LAST BLOOD CULTURE:  Results for orders placed or performed during the hospital encounter of 12/29/17   Blood Culture   Result Value Ref Range    Specimen Description BLOOD, ARTERIAL LINE     CULTURE NO GROWTH 3 DAYS.     REPORT STATUS PENDING        Patient is on cefepime I will add Flagyl 500 q.8 hours. I also discussed case with Dr. Pandey from infectious disease, awaiting BAL cultures. Zyvox was discontinued on 1/2/2018.    Renal:   Patient is not doing  acceptably well. Kidney functions are improving as compared to yesterday. He is in positive balance. Nephrology team is managing him from kidney point of view we will continue to follow the recommendations. We will continue monitor patient's urine output replace his electrolytes as needed. Last 24 In and Out are as follows.       Intake/Output Summary (Last 24 hours) at 01/03/18 0833  Last data filed at 01/03/18 0755   Gross per 24 hour   Intake             2238 ml   Output             4870 ml   Net            -2632 ml        I will ask the nursing staff of the [facility] on the patient for accurate ins and outs.     Last BUN and Cr as follows.    Recent Labs      01/03/18   0405   CREATININE  1.93*         Prophylaxis: Patient is on SCDs for DVT prophylaxis. Pt is on PPI for GI prophylaxis and keep the patient's head of the bed elevated to 30°.     OT/PT:  Continue to do passive motion with the patient.    Nutritional support:   Not tolerating well residual was 240 cc I will restart the feeding of the spontaneous breathing trials today.    Lines:   Patient has peripheral lines which looks clean. Patient does have line.  Peripheral IV 12/30/17 Right Wrist 22 (Active)   Site Assessment WDL 1/1/2018  7:00 AM   Dressing Type Transparent 1/1/2018  7:00 AM   Dressing Activity Monitored 1/1/2018  7:00 AM   Line Status Infusing 12/31/2017  8:00 AM   Interventions Capped IV 1/1/2018  7:00 AM     Available Intravenous Access     PICC Line / CVC Line / PIV Line / Intraosseous Line / Line / UAC Line            Central Venous Catheter 12/31/17 Right Jugular Triple Non-tunneled 3 days                  Family/Patient Discussion:   CODE STATUS:  Patient is currently Full Resuscitation. I have discussed with the patient son at bedside explaining him patient's condition and my thought process of pneumonia, neuromuscular problems about patient’s current condition and possibility of tracheostomy and told that we'll continue to monitor  patient at this time. I discussed with them regarding the findings and treatment plan. After discussing the Risk Benefits and alternatives of treatment and not treating the underlying disease process, I told them my assessment of today is an opinion, new findings may develop, different explanations can be found and additional treatments may be necessary. They understand and agree. All questions were answered to their satisfaction.     Provider Discussion:   I have discussed the plan with Dr Pandya and the staff taking care of the patient. After discussing patients conditions and possible diagnosis of patient involved providers agree with above plan of care at this time.         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)   • Trigeminal neuralgia   • Atrial fibrillation with rapid ventricular response (CMS/HCC)   • VIOLETTA (acute kidney injury) (CMS/HCC)   • Acute diastolic heart failure (CMS/HCC)   • Hypoxemia   • Positive D dimer   • Abnormal color of sputum   • Hypokalemia   • Acute respiratory failure with hypercapnia (CMS/HCC)         Vitals:   Temp:  [98.4 °F (36.9 °C)-99.3 °F (37.4 °C)] 98.8 °F (37.1 °C)  Pulse:  [] 92  Resp:  [22-26] 26  BP: ()/(48-98) 154/83  FiO2 (%):  [35 %] 35 %    Physical Exam:    General Appear: Sedated on the ventilator but following commands  Eyes:                       PERRL  Ears/Nose/Throat: pharynx clear, mucous membranes moist  Neck:   supple, FROM, no adenopathy, no JVD  Cardiovascular: S1-S2 is positive no S3 or S4 is heard  Respiratory:  Bilaterally transmitted breath sounds basilar rales no wheezing   Abdomen:  soft,  non-tender, non-distended, good bowel sounds  Musculoskeletal: no edema, deformity  Skin:   dry, no rash  Neurologic:  Sedated but following commands    I/O’s:    Intake/Output Summary (Last 24 hours) at 01/03/18 0833  Last data filed at 01/03/18 0755   Gross per 24 hour   Intake             2238 ml   Output             4870 ml   Net            -2632 ml       Labs:  Recent Labs      01/01/18   0948  01/02/18   0500  01/03/18   0405   WBC  3.4*  15.2*  16.1*   HGB  10.1*  10.9*  11.3*   HCT  33.1*  35.6*  36.6*   PLT  89*  132*  140     Recent Labs      12/31/17   0850   01/01/18   0445   01/01/18   2030  01/02/18   0500  01/03/18   0405   CO2   --    < >  25   < >  27  29  33*   BUN   --    < >  44*   < >  48*  48*  62*   CREATININE   --    < >  1.78*   < >  1.96*  1.95*  1.93*   CALCIUM   --    < >  8.6   < >  8.9  9.1  9.0   MG   --    --   1.9   --    --   1.9  2.1   PHOS  3.4   --   2.9   --    --    --   3.7    < > = values in this interval not displayed.     No results for input(s): AST in the last 72 hours.    Invalid input(s): ALT, TROPONIN, PROCALCITONIN, LACTICACID  No results for input(s): INR, PT, PTT in the last 72 hours.    ABG  Lab Results   Component Value Date    APH 7.37 01/03/2018    APO2 128 (H) 01/03/2018    FIO2 35 01/02/2018    AHCO3 33 (H) 01/03/2018    APCO2 60 (H) 01/03/2018       Diagnostics:  I have reviewed the relevant diagnostic imaging.  XR Chest AP or PA   Final Result   IMPRESSION:      Stable appearance of mild probable CHF/volume overload changes.                  XR Chest AP or PA   Final Result   IMPRESSION: Persistent bilateral interstitial/bronchovascular markings with   slight hazy left lower lung field opacification.         XR Chest AP or PA   Final Result   IMPRESSION:   1. Overall stable chest with diffuse interstitial prominence and peripheral   infiltration and peripheral ill-defined infiltrates similar to previous CT   12/31/2017   2. Slight increased opacity left lung  base   3. Lines and tubes stable         CT CHEST   Final Result   IMPRESSION:   1. There are dense airspace infiltrates with air bronchograms present in   the posterior inferior bilateral upper lobes, the lingula, and the   bilateral lower lobes. The distribution suggests possible aspiration.   Correlate clinically.   2. There is interstitial thickening and peribronchial cuffing seen   throughout the lungs. The heart is enlarged with a small pericardial   effusion. The findings suggest a component of vascular congestion or fluid   overload. Correlate clinically.   3. Satisfactory positioning of a nasogastric tube and endotracheal tube.   4. Underlying emphysematous changes and apical fibrotic disease is present.   Correlate for past history of obstructive lung disease.   5. There are densely calcified, pathologic lymph nodes in the right hilum,   the middle mediastinum, and AP window. The findings are suggestive of   granulomatous disease. Correlate with history. There are also granulomatous   changes present within the spleen         CT HEAD BRAIN   Final Result   IMPRESSION:   1. No acute intracranial hemorrhage, extraaxial fluid collection, shift of   the midline, or other mass effect.   2. Stable changes of chronic small vessel ischemic disease in the   periventricular white matter.   3. No CT evidence for evolving infarct in a major intracranial vascular   territory, although MRI is more sensitive for detection of acute ischemia.    4. Extensive mucosal thickening in the bilateral ethmoid air cells.   5. No significant interval change..         XR CHEST AP OR PA   Final Result   FINDINGS/IMPRESSION:   The patient's endotracheal tube is in stable and satisfactory position.      There is a new nasogastric tube that courses into the gastric lumen.      There is a right neck central venous catheter that appears to terminate   near the right atrial-SVC junction. There is no apparent complication or   pneumothorax.       The chest x-ray is otherwise grossly stable with diffuse underlying   parenchymal fibrotic changes as well as central changes of vascular   congestion. Some mild basilar airspace disease is also grossly stable.         XR ABDOMEN AP KUB   Final Result   FINDINGS/IMPRESSION:: The orogastric tube is seen at the superior margin of   this film near the right hemidiaphragm. It does course below the   hemidiaphragm and appears to be within the proximal aspect of the gastric   lumen, especially on referenced with the concurrently ordered chest x-ray.      There is bibasilar airspace disease with what appears to be small   effusions. Correlation with the additional chest x-rays recommended.      There is a nonobstructed bowel gas pattern. There is no free air in the   abdomen. There is no wall thickening or pneumatosis. Surgical changes and   small calcifications are suggested in the left upper quadrant, nonspecific.         XR CHEST AP OR PA   Final Result   FINDINGS/IMPRESSION: DUE to the patient's chin position and the film   technique, it is difficult to fully addressed/evaluate the position of   endotracheal tube. There appears to be a endotracheal tube projecting over   the upper chest, at approximately the level of the clavicular heads. It   lies approximately 17 mm above the shira. Correlation with follow-up films   is recommended.      Heart size is at the upper limits of normal. Parenchymal fibrotic changes   within the bilateral mid and upper lung fields are stable. Some mild left   basilar airspace disease may still be present, but no focal consolidation   is appreciated. There is no large effusion or pneumothorax definitively   identified. There does appear to be significant bilateral apical capping   present.         XR Chest AP or PA   Final Result   Impression: No significant change from previous exam. No acute infiltrates.         CT ANGIOGRAM CHEST    (Results Pending)   XR Chest AP or PA    (Results  Pending)   XR CHEST AP OR PA    (Results Pending)   XR Chest AP or PA    (Results Pending)   XR Chest AP or PA    (Results Pending)       Microbiology:  I have reviewed the relevant microbiology.  Microbiology Results  (Last 10 results in the past 7 days)    Specimen Gram Smear Culture Result Status      01/02/18  1150 01/02/18  1150 01/02/18  1150 01/02/18  1150     LUNG, RIGHT UPPER LOBE BRONCHOALVEOLAR LAVAGE  ET ASPRIATE MANY POLYMORPHONUCLEAR CELLS PENDING PENDING      RARE EPITHELIAL CELLS        FEW GRAM NEGATIVE BACILLI        RARE GRAM POSITIVE COCCI        RARE GRAM POSITIVE BACILLI       12/31/17  0905 12/31/17  0905 12/31/17  0905 12/31/17  0905     SPUTUM ENDOTRACHEAL ASPIRATE ADEQUATE QUALITY SPECIMEN. ACUTE INFLAMMATION WITH MODERATE/MANY NEUTROPHILS PRESENT. RARE PSEUDOMONAS AERUGINOSA(P) 01/02/2018 FINAL      NO ORGANISMS SEEN       12/31/17  0904 12/30/17  0719 12/31/17  0904 12/31/17  0904     BLOOD, PERIPHERAL RIGHT HAND ADEQUATE QUALITY SPECIMEN.  ACUTE INFLAMMATION WITH MODERATE/MANY NEUTROPHILS.  MIXED BACTERIA WITH NO PREDOMINANT TYPE. NO GROWTH 3 DAYS. PENDING     12/31/17  0859  12/31/17  0859 12/31/17  0859     URINE, CATHETERIZED, BLADDER  NO GROWTH 2 DAYS. 01/02/2018 FINAL     12/31/17  0850  12/31/17  0850 12/31/17  0850     BLOOD, ARTERIAL LINE  NO GROWTH 3 DAYS. PENDING     12/30/17  0719  12/30/17  0719 12/30/17  0719     SPUTUM  RARE NORMAL UPPER RESPIRATORY FATMATA 01/01/2018 FINAL            Medications:  • metroNIDAZOLE  500 mg Per NG tube TID   • furosemide  80 mg Per NG tube Daily   • midodrine  10 mg Per NG tube TID WC   • famotidine  20 mg Per NG tube Daily   • pyridostigmine  30 mg Per NG tube 4x Daily   • insulin lispro   Subcutaneous 4 times per day   • epoetin yaa  10,000 Units Subcutaneous Once per day on Mon Wed Fri   • lactobacillus acidophilus  1 tablet Per NG tube BID   • chlorhexidine gluconate  15 mL Swish & Spit 2 times per day   • petrolatum(white)/mineral  oil/NaCL  1 application Both Eyes 6 times per day   • methylPREDNISolone  40 mg Intravenous 3 times per day   • budesonide  0.5 mg Nebulization BID Resp   • cholecalciferol  2,000 Units Per NG tube Daily   • carBAMazepine  100 mg Per NG tube Q6H   • ceFEPime (MAXIPIME) IVPB for Most Indications  2,000 mg Intravenous Q24H   • sodium chloride (PF)  2 mL Injection 2 times per day   • enoxaparin (LOVENOX) injection  40 mg Subcutaneous Q24H   • albuterol-ipratropium 2.5 mg/0.5 mg  3 mL Nebulization 4x Daily Resp     • dextrose 5 % infusion     • norepinephrine (LEVOPHED) 4 mg in sodium chloride 0.9% 250 mL infusion Stopped (01/03/18 0824)   • DilTIAZem HCl 125 mg in sodium chloride 0.9 % 50 mL total volume infusion 5 mg/hr (01/03/18 0521)   • propofol (DIPRIVAN) infusion 10 mcg/kg/min (01/03/18 0641)        I discussed the patient's management with the primary care team.  Critical care: Patient has a critical, potentially life threatening illness.    I spent 40 minutes providing critical care.    Critical care was necessary to treat respiratory failure.    -------------    Jace Ching MD, [facility]  Thoracic/Sleep/Critcal Care   1/3/2018  8:33 AM

## 2023-05-26 NOTE — ASSESSMENT & PLAN NOTE
Diagnosed in 2017, status postchemotherapy and radiation with resultant ureteric strictures  Completed ENDOBARR (Bevacizumab, Atezolizumab and Rucaparib) clinical trial in 12/2022, now in remission and under active surveilance  Follows with [facility] gyn-onc

## 2023-05-27 ENCOUNTER — ANESTHESIA EVENT (INPATIENT)
Dept: RADIOLOGY | Facility: HOSPITAL | Age: 68
End: 2023-05-27

## 2023-05-27 ENCOUNTER — APPOINTMENT (INPATIENT)
Dept: RADIOLOGY | Facility: HOSPITAL | Age: 68
End: 2023-05-27
Attending: INTERNAL MEDICINE

## 2023-05-27 ENCOUNTER — ANESTHESIA (INPATIENT)
Dept: RADIOLOGY | Facility: HOSPITAL | Age: 68
End: 2023-05-27

## 2023-05-27 LAB
ALBUMIN SERPL BCP-MCNC: 3 G/DL (ref 3.5–5)
ALP SERPL-CCNC: 93 U/L (ref 34–104)
ALT SERPL W P-5'-P-CCNC: 5 U/L (ref 7–52)
ANION GAP SERPL CALCULATED.3IONS-SCNC: 11 MMOL/L (ref 4–13)
APTT PPP: 59 SECONDS (ref 23–37)
AST SERPL W P-5'-P-CCNC: 7 U/L (ref 13–39)
BASOPHILS # BLD AUTO: 0.04 THOUSANDS/ÂΜL (ref 0–0.1)
BASOPHILS NFR BLD AUTO: 0 % (ref 0–1)
BILIRUB SERPL-MCNC: 0.22 MG/DL (ref 0.2–1)
BUN SERPL-MCNC: 75 MG/DL (ref 5–25)
CALCIUM ALBUM COR SERPL-MCNC: 9 MG/DL (ref 8.3–10.1)
CALCIUM SERPL-MCNC: 8.2 MG/DL (ref 8.4–10.2)
CHLORIDE SERPL-SCNC: 109 MMOL/L (ref 96–108)
CO2 SERPL-SCNC: 16 MMOL/L (ref 21–32)
CREAT SERPL-MCNC: 3.32 MG/DL (ref 0.6–1.3)
EOSINOPHIL # BLD AUTO: 0.2 THOUSAND/ÂΜL (ref 0–0.61)
EOSINOPHIL NFR BLD AUTO: 2 % (ref 0–6)
ERYTHROCYTE [DISTWIDTH] IN BLOOD BY AUTOMATED COUNT: 14.8 % (ref 11.6–15.1)
GFR SERPL CREATININE-BSD FRML MDRD: 13 ML/MIN/1.73SQ M
GLUCOSE SERPL-MCNC: 83 MG/DL (ref 65–140)
HCT VFR BLD AUTO: 24.2 % (ref 34.8–46.1)
HGB BLD-MCNC: 7.2 G/DL (ref 11.5–15.4)
IMM GRANULOCYTES # BLD AUTO: 0.11 THOUSAND/UL (ref 0–0.2)
IMM GRANULOCYTES NFR BLD AUTO: 1 % (ref 0–2)
LYMPHOCYTES # BLD AUTO: 0.74 THOUSANDS/ÂΜL (ref 0.6–4.47)
LYMPHOCYTES NFR BLD AUTO: 8 % (ref 14–44)
MAGNESIUM SERPL-MCNC: 1.8 MG/DL (ref 1.9–2.7)
MCH RBC QN AUTO: 28.2 PG (ref 26.8–34.3)
MCHC RBC AUTO-ENTMCNC: 29.8 G/DL (ref 31.4–37.4)
MCV RBC AUTO: 95 FL (ref 82–98)
MONOCYTES # BLD AUTO: 0.82 THOUSAND/ÂΜL (ref 0.17–1.22)
MONOCYTES NFR BLD AUTO: 9 % (ref 4–12)
NEUTROPHILS # BLD AUTO: 7.12 THOUSANDS/ÂΜL (ref 1.85–7.62)
NEUTS SEG NFR BLD AUTO: 80 % (ref 43–75)
NRBC BLD AUTO-RTO: 0 /100 WBCS
PHOSPHATE SERPL-MCNC: 5.6 MG/DL (ref 2.3–4.1)
PLATELET # BLD AUTO: 254 THOUSANDS/UL (ref 149–390)
PMV BLD AUTO: 9.4 FL (ref 8.9–12.7)
POTASSIUM SERPL-SCNC: 4.1 MMOL/L (ref 3.5–5.3)
PROT SERPL-MCNC: 7 G/DL (ref 6.4–8.4)
RBC # BLD AUTO: 2.55 MILLION/UL (ref 3.81–5.12)
SODIUM SERPL-SCNC: 136 MMOL/L (ref 135–147)
WBC # BLD AUTO: 9.03 THOUSAND/UL (ref 4.31–10.16)

## 2023-05-27 PROCEDURE — 0T9430Z DRAINAGE OF LEFT KIDNEY PELVIS WITH DRAINAGE DEVICE, PERCUTANEOUS APPROACH: ICD-10-PCS | Performed by: INTERNAL MEDICINE

## 2023-05-27 PROCEDURE — 0T9330Z DRAINAGE OF RIGHT KIDNEY PELVIS WITH DRAINAGE DEVICE, PERCUTANEOUS APPROACH: ICD-10-PCS | Performed by: INTERNAL MEDICINE

## 2023-05-27 RX ORDER — SODIUM CHLORIDE 450 MG/100ML
75 INJECTION, SOLUTION INTRAVENOUS CONTINUOUS
Status: DISCONTINUED | OUTPATIENT
Start: 2023-05-27 | End: 2023-05-29

## 2023-05-27 RX ORDER — ROCURONIUM BROMIDE 10 MG/ML
INJECTION, SOLUTION INTRAVENOUS AS NEEDED
Status: DISCONTINUED | OUTPATIENT
Start: 2023-05-27 | End: 2023-05-27

## 2023-05-27 RX ORDER — GLYCOPYRROLATE 0.2 MG/ML
INJECTION INTRAMUSCULAR; INTRAVENOUS AS NEEDED
Status: DISCONTINUED | OUTPATIENT
Start: 2023-05-27 | End: 2023-05-27

## 2023-05-27 RX ORDER — PROPOFOL 10 MG/ML
INJECTION, EMULSION INTRAVENOUS AS NEEDED
Status: DISCONTINUED | OUTPATIENT
Start: 2023-05-27 | End: 2023-05-27

## 2023-05-27 RX ORDER — CEFAZOLIN SODIUM 1 G/50ML
SOLUTION INTRAVENOUS AS NEEDED
Status: DISCONTINUED | OUTPATIENT
Start: 2023-05-27 | End: 2023-05-27

## 2023-05-27 RX ORDER — DIPHENHYDRAMINE HYDROCHLORIDE 50 MG/ML
12.5 INJECTION INTRAMUSCULAR; INTRAVENOUS ONCE AS NEEDED
Status: DISCONTINUED | OUTPATIENT
Start: 2023-05-27 | End: 2023-05-27 | Stop reason: HOSPADM

## 2023-05-27 RX ORDER — FENTANYL CITRATE 50 UG/ML
INJECTION, SOLUTION INTRAMUSCULAR; INTRAVENOUS AS NEEDED
Status: DISCONTINUED | OUTPATIENT
Start: 2023-05-27 | End: 2023-05-27

## 2023-05-27 RX ORDER — LIDOCAINE HYDROCHLORIDE 10 MG/ML
INJECTION, SOLUTION EPIDURAL; INFILTRATION; INTRACAUDAL; PERINEURAL AS NEEDED
Status: COMPLETED | OUTPATIENT
Start: 2023-05-27 | End: 2023-05-27

## 2023-05-27 RX ORDER — SODIUM CHLORIDE 9 MG/ML
10 INJECTION INTRAVENOUS DAILY
Qty: 900 ML | Refills: 0 | Status: SHIPPED | OUTPATIENT
Start: 2023-05-27 | End: 2023-08-25

## 2023-05-27 RX ORDER — HYDROMORPHONE HCL IN WATER/PF 6 MG/30 ML
0.2 PATIENT CONTROLLED ANALGESIA SYRINGE INTRAVENOUS
Status: DISCONTINUED | OUTPATIENT
Start: 2023-05-27 | End: 2023-05-27 | Stop reason: HOSPADM

## 2023-05-27 RX ORDER — FENTANYL CITRATE/PF 50 MCG/ML
25 SYRINGE (ML) INJECTION
Status: DISCONTINUED | OUTPATIENT
Start: 2023-05-27 | End: 2023-05-27 | Stop reason: HOSPADM

## 2023-05-27 RX ORDER — HEPARIN SODIUM 10000 [USP'U]/100ML
3-20 INJECTION, SOLUTION INTRAVENOUS
Status: DISCONTINUED | OUTPATIENT
Start: 2023-05-27 | End: 2023-05-31 | Stop reason: HOSPADM

## 2023-05-27 RX ORDER — LIDOCAINE HYDROCHLORIDE 10 MG/ML
INJECTION, SOLUTION EPIDURAL; INFILTRATION; INTRACAUDAL; PERINEURAL AS NEEDED
Status: DISCONTINUED | OUTPATIENT
Start: 2023-05-27 | End: 2023-05-27

## 2023-05-27 RX ORDER — ONDANSETRON 2 MG/ML
4 INJECTION INTRAMUSCULAR; INTRAVENOUS ONCE AS NEEDED
Status: DISCONTINUED | OUTPATIENT
Start: 2023-05-27 | End: 2023-05-27 | Stop reason: HOSPADM

## 2023-05-27 RX ADMIN — PANTOPRAZOLE SODIUM 40 MG: 40 TABLET, DELAYED RELEASE ORAL at 05:52

## 2023-05-27 RX ADMIN — SODIUM BICARBONATE 650 MG: 650 TABLET ORAL at 21:29

## 2023-05-27 RX ADMIN — VENLAFAXINE 75 MG: 37.5 TABLET ORAL at 11:01

## 2023-05-27 RX ADMIN — SENNOSIDES AND DOCUSATE SODIUM 1 TABLET: 50; 8.6 TABLET ORAL at 16:41

## 2023-05-27 RX ADMIN — SODIUM CHLORIDE 75 ML/HR: 4.5 INJECTION, SOLUTION INTRAVENOUS at 18:53

## 2023-05-27 RX ADMIN — ASPIRIN 81 MG: 81 TABLET, COATED ORAL at 16:39

## 2023-05-27 RX ADMIN — SODIUM BICARBONATE 650 MG: 650 TABLET ORAL at 11:01

## 2023-05-27 RX ADMIN — B-COMPLEX W/ C & FOLIC ACID TAB 1 TABLET: TAB at 11:01

## 2023-05-27 RX ADMIN — LIDOCAINE HYDROCHLORIDE 50 MG: 10 INJECTION, SOLUTION EPIDURAL; INFILTRATION; INTRACAUDAL at 09:28

## 2023-05-27 RX ADMIN — ARIPIPRAZOLE 20 MG: 5 TABLET ORAL at 11:01

## 2023-05-27 RX ADMIN — ACETAMINOPHEN 650 MG: 325 TABLET ORAL at 11:09

## 2023-05-27 RX ADMIN — ROCURONIUM BROMIDE 30 MG: 10 SOLUTION INTRAVENOUS at 09:29

## 2023-05-27 RX ADMIN — FENTANYL CITRATE 25 MCG: 50 INJECTION, SOLUTION INTRAMUSCULAR; INTRAVENOUS at 09:40

## 2023-05-27 RX ADMIN — HEPARIN SODIUM 1500 UNITS: 1000 INJECTION INTRAVENOUS; SUBCUTANEOUS at 03:33

## 2023-05-27 RX ADMIN — CEFAZOLIN SODIUM 1000 MG: 1 SOLUTION INTRAVENOUS at 09:45

## 2023-05-27 RX ADMIN — POLYETHYLENE GLYCOL 3350 17 G: 17 POWDER, FOR SOLUTION ORAL at 11:01

## 2023-05-27 RX ADMIN — IOHEXOL 20 ML: 350 INJECTION, SOLUTION INTRAVENOUS at 10:28

## 2023-05-27 RX ADMIN — SODIUM BICARBONATE 650 MG: 650 TABLET ORAL at 16:39

## 2023-05-27 RX ADMIN — SENNOSIDES AND DOCUSATE SODIUM 1 TABLET: 50; 8.6 TABLET ORAL at 11:01

## 2023-05-27 RX ADMIN — VENLAFAXINE 75 MG: 37.5 TABLET ORAL at 21:29

## 2023-05-27 RX ADMIN — HEPARIN SODIUM 14 UNITS/KG/HR: 10000 INJECTION, SOLUTION INTRAVENOUS at 03:33

## 2023-05-27 RX ADMIN — VENLAFAXINE 75 MG: 37.5 TABLET ORAL at 16:39

## 2023-05-27 RX ADMIN — FENTANYL CITRATE 50 MCG: 50 INJECTION, SOLUTION INTRAMUSCULAR; INTRAVENOUS at 09:28

## 2023-05-27 RX ADMIN — OXYBUTYNIN CHLORIDE 15 MG: 5 TABLET, EXTENDED RELEASE ORAL at 21:30

## 2023-05-27 RX ADMIN — PROPOFOL 110 MG: 10 INJECTION, EMULSION INTRAVENOUS at 09:28

## 2023-05-27 RX ADMIN — SUGAMMADEX 200 MG: 100 INJECTION, SOLUTION INTRAVENOUS at 10:10

## 2023-05-27 RX ADMIN — LIDOCAINE HYDROCHLORIDE 10 ML: 10 INJECTION, SOLUTION EPIDURAL; INFILTRATION; INTRACAUDAL at 09:57

## 2023-05-27 RX ADMIN — ACETAMINOPHEN 650 MG: 325 TABLET ORAL at 18:57

## 2023-05-27 RX ADMIN — GLYCOPYRROLATE 0.2 MG: 0.4 INJECTION INTRAMUSCULAR; INTRAVENOUS at 09:52

## 2023-05-27 RX ADMIN — FENTANYL CITRATE 25 MCG: 50 INJECTION, SOLUTION INTRAMUSCULAR; INTRAVENOUS at 09:48

## 2023-05-27 NOTE — PLAN OF CARE
Problem: PAIN - ADULT  Goal: Verbalizes/displays adequate comfort level or baseline comfort level  Description: Interventions:  - Encourage patient to monitor pain and request assistance  - Assess pain using appropriate pain scale  - Administer analgesics based on type and severity of pain and evaluate response  - Implement non-pharmacological measures as appropriate and evaluate response  - Consider cultural and social influences on pain and pain management  - Notify physician/advanced practitioner if interventions unsuccessful or patient reports new pain  Outcome: Progressing     Problem: INFECTION - ADULT  Goal: Absence or prevention of progression during hospitalization  Description: INTERVENTIONS:  - Assess and monitor for signs and symptoms of infection  - Monitor lab/diagnostic results  - Monitor all insertion sites, i e  indwelling lines, tubes, and drains  - Monitor endotracheal if appropriate and nasal secretions for changes in amount and color  - [place] appropriate cooling/warming therapies per order  - Administer medications as ordered  - Instruct and encourage patient and family to use good hand hygiene technique  - Identify and instruct in appropriate isolation precautions for identified infection/condition  Outcome: Progressing  Goal: Absence of fever/infection during neutropenic period  Description: INTERVENTIONS:  - Monitor WBC    Outcome: Progressing     Problem: SAFETY ADULT  Goal: Patient will remain free of falls  Description: INTERVENTIONS:  - Educate patient/family on patient safety including physical limitations  - Instruct patient to call for assistance with activity   - Consult OT/PT to assist with strengthening/mobility   - Keep Call bell within reach  - Keep bed low and locked with side rails adjusted as appropriate  - Keep care items and personal belongings within reach  - Initiate and maintain comfort rounds  - Make Fall Risk Sign visible to staff  - Offer Toileting every 2 Hours, in advance of need  - Initiate/Maintain bed alarm  - Obtain necessary fall risk management equipment: non skid socks  - Apply yellow socks and bracelet for high fall risk patients  - Consider moving patient to room near nurses station  Outcome: Progressing  Goal: Maintain or return to baseline ADL function  Description: INTERVENTIONS:  -  Assess patient's ability to carry out ADLs; assess patient's baseline for ADL function and identify physical deficits which impact ability to perform ADLs (bathing, care of mouth/teeth, toileting, grooming, dressing, etc )  - Assess/evaluate cause of self-care deficits   - Assess range of motion  - Assess patient's mobility; develop plan if impaired  - Assess patient's need for assistive devices and provide as appropriate  - Encourage maximum independence but intervene and supervise when necessary  - Involve family in performance of ADLs  - Assess for home care needs following discharge   - Consider OT consult to assist with ADL evaluation and planning for discharge  - Provide patient education as appropriate  Outcome: Progressing  Goal: Maintains/Returns to pre admission functional level  Description: INTERVENTIONS:  - Perform BMAT or MOVE assessment daily    - Set and communicate daily mobility goal to care team and patient/family/caregiver  - Collaborate with rehabilitation services on mobility goals if consulted  - Perform Range of Motion n/a times a day  - Reposition patient every n/a hours    - Dangle patient 3 times a day  - Stand patient 3 times a day  - Ambulate patient 3 times a day  - Out of bed to chair 3 times a day   - Out of bed for meals 3 times a day  - Out of bed for toileting  - Record patient progress and toleration of activity level   Outcome: Progressing     Problem: SAFETY ADULT  Goal: Maintain or return to baseline ADL function  Description: INTERVENTIONS:  -  Assess patient's ability to carry out ADLs; assess patient's baseline for ADL function and identify physical deficits which impact ability to perform ADLs (bathing, care of mouth/teeth, toileting, grooming, dressing, etc )  - Assess/evaluate cause of self-care deficits   - Assess range of motion  - Assess patient's mobility; develop plan if impaired  - Assess patient's need for assistive devices and provide as appropriate  - Encourage maximum independence but intervene and supervise when necessary  - Involve family in performance of ADLs  - Assess for home care needs following discharge   - Consider OT consult to assist with ADL evaluation and planning for discharge  - Provide patient education as appropriate  Outcome: Progressing     Problem: DISCHARGE PLANNING  Goal: Discharge to home or other facility with appropriate resources  Description: INTERVENTIONS:  - Identify barriers to discharge w/patient and caregiver  - Arrange for needed discharge resources and transportation as appropriate  - Identify discharge learning needs (meds, wound care, etc )  - Arrange for interpretive services to assist at discharge as needed  - Refer to Case Management Department for coordinating discharge planning if the patient needs post-hospital services based on physician/advanced practitioner order or complex needs related to functional status, cognitive ability, or social support system  Outcome: Progressing     Problem: Knowledge Deficit  Goal: Patient/family/caregiver demonstrates understanding of disease process, treatment plan, medications, and discharge instructions  Description: Complete learning assessment and assess knowledge base    Interventions:  - Provide teaching at level of understanding  - Provide teaching via preferred learning methods  Outcome: Progressing

## 2023-05-27 NOTE — ASSESSMENT & PLAN NOTE
Patient reports constipation at baseline for which she takes MiraLAX and senna  We will order bowel regimen

## 2023-05-27 NOTE — ANESTHESIA PREPROCEDURE EVALUATION
Procedure:  IR NEPHROSTOMY TUBE PLACEMENT    Relevant Problems   CARDIO   (+) Acute deep vein thrombosis (DVT) of proximal vein of lower extremity (HCC)   (+) Benign essential hypertension   (+) Exertional dyspnea      /RENAL   (+) Acute kidney injury ([address] )   (+) Acute on chronic kidney disease   (+) Chronic kidney disease   (+) Chronic kidney disease, stage 3a (HCC)   (+) Nephrotoxicity      GYN   (+) Endometrial cancer (HCC)      HEMATOLOGY   (+) Anemia   (+) Drug-induced anemia   (+) Iron deficiency anemia secondary to inadequate dietary iron intake      MUSCULOSKELETAL   (+) Acute left-sided low back pain without sciatica   (+) Bilateral piriformis syndrome   (+) Myofascial pain syndrome      NEURO/PSYCH   (+) Chronic pain syndrome   (+) Continuous opioid dependence (HCC)   (+) Depression, recurrent (HCC)   (+) Major depression, chronic   (+) Myofascial pain syndrome      PULMONARY   (+) Exertional dyspnea        Physical Exam    Airway    Mallampati score: III  TM Distance: >3 FB  Neck ROM: full     Dental       Cardiovascular      Pulmonary      Other Findings  Able to sublux mandible  Anesthesia Plan  ASA Score- 3     Anesthesia Type- general with ASA Monitors  Additional Monitors:   Airway Plan: ETT  Comment: Prone  Hx of difficult intubation, but no supporting documentation/details  Patient denies ever being told she is a difficult airway  Reassuring airway exam and history (denies hx head/neck cancer/radiation)  VL  Leo Mixer Plan Factors-Exercise tolerance (METS): >4 METS  Chart reviewed  Patient is not a current smoker  Obstructive sleep apnea risk education given perioperatively  Induction- intravenous  Postoperative Plan- Plan for postoperative opioid use  Planned trial extubation    Informed Consent- Anesthetic plan and risks discussed with patient  I personally reviewed this patient with the CRNA   Discussed and agreed on the Anesthesia Plan with the CRNA  Anesthesia plan and consent discussed with Ragini who expressed understanding and agreement  Risks/benefits and alternatives discussed with patient including possible PONV, sore throat, damage to teeth/lips/gums and possibility of rare anesthetic and surgical emergencies

## 2023-05-27 NOTE — SEDATION DOCUMENTATION
B/L PCN tubes inserted  Patient tolerated well with anesthesia support  Report called to primary RN and transferred to PACU for recovery

## 2023-05-27 NOTE — UTILIZATION REVIEW
Initial Clinical Review    Admission: Date/Time/Statement:   Admission Orders (From admission, onward)     Ordered        05/26/23 1545  INPATIENT ADMISSION  Once                      Orders Placed This Encounter   Procedures   • INPATIENT ADMISSION     Standing Status:   Standing     Number of Occurrences:   1     Order Specific Question:   Level of Care     Answer:   Med Surg [16]     Order Specific Question:   Estimated length of stay     Answer:   More than 2 Midnights     Order Specific Question:   Certification     Answer:   I certify that inpatient services are medically necessary for this patient for a duration of greater than two midnights  See H&P and MD Progress Notes for additional information about the patient's course of treatment  ED Arrival Information     Expected   5/26/2023     Arrival   5/26/2023 14:30    Acuity   Urgent            Means of arrival   Wheelchair    Escorted by   Family Member    Service   Hospitalist    Admission type   Emergency            Arrival complaint   hydronephrosis           Chief Complaint   Patient presents with   • Flank Pain     Pt states she has stents and that theyre not working  Pt states her creatinine was high and was instructed to come for nephrostomy tubes  Initial Presentation: 74 y o  female with  PMH of endometrial carcinoma s/p chemo and radiation with 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, DVT  2019, anemia, and stage 3 CKD who presents to ED from home due to elevated creatinine of 3 06 noted on outpt labs  From previous baseline 98661 1, concern for stent occlusion   Pt recently had stents replaced for [place] with obstructed stents and d/c'ed from hosp 5/13/23 with creat 2 47 at that time  On exam,  R sided flank pain , pt cachectic   Labs Creat 3 13  CT A/P shows moderate right and mild to moderate left hydronephrosis with ureteral stent in place   Persistent hydronephrosis raises concern for malfunctioning of the catheters, also concern for cystitis  Pt admitted as Inpatient with [place] on CKD, bilat hydronephrosis s/p bilat ureteral stents  Plan - urology consult, IR consult for bilat PCN tube placement, F/U UA, empiric abx, trend creat  Place on heparin drip, hold home Lovenox  IVF    Urology consult- no leukocytosis  F/U UA, IR consult for PCN tubes   Monitor creat  IR consult- Bilateral hydronephrosis has worsened despite bilateral internal ureteral stent placement  PLan for PCN tube placement bilat tomorrow as pt ate today  Hold ASA  Platelet / INR acceptable to proceed  Date:5/27   Day 2:    IR -5/27/23 @ 1013  B/L PCN tubes inserted  Patient tolerated well with general anesthesia    10 2 Vietnamese PCN placement   Per IR- flush both tubes daily with 10 mL saline  Maintain bag drainage of both catheters  Heparin gtt may be restarted in 6 hours  plan for routine exchange in 3 months    Urology  Right PCN is secure and patent for cloudy bloody output  Left PCN is grossly clear  Continuen to trend labs   Replete fluids given patient will likely experience postobstructive diuresis  Medicine- creat done today remains elevated  3 32, was done prior to PCN tube this am, will check creat tomorrow am  Heparin drip held for procedure today, will restart 6 hrs after PCN tube insertion   Will transition to oral AC once creatinine is slightly improved  IVF infusing   Date: 5/28 POD #1  Day 3: Has surpassed a 2nd midnight with active treatments and services  Pt remains with IVF, Heparin drip  Creat monitoring with creat down to 3 0 today   Monitoring PCN drainage    Pt with temp 100 4 F this am     ED Triage Vitals   Temperature Pulse Respirations Blood Pressure SpO2   05/26/23 1437 05/26/23 1435 05/26/23 1435 05/26/23 1435 05/26/23 1435   98 °F (36 7 °C) 74 14 98/52 100 %      Temp Source Heart Rate Source Patient Position - Orthostatic VS BP Location FiO2 (%)   05/26/23 1437 05/26/23 1435 05/26/23 1435 05/26/23 1435 --   Oral Monitor Sitting Right arm       Pain Score       05/26/23 1518       2          Wt Readings from Last 1 Encounters:   05/17/23 50 3 kg (111 lb)     Additional Vital Signs:   Date/Time Temp Pulse Resp BP MAP (mmHg) SpO2 O2 Flow Rate (L/min) O2 Device Cardiac (WDL) Patient Position - Orthostatic VS   05/28/23 07:58:15 100 4 °F (38 °C) 88 -- 103/60 74 98 % -- -- -- --   05/28/23 03:28:23 -- 99 16 116/69 85 98 % -- -- -- --   05/27/23 23:41:51 99 8 °F (37 7 °C) 97 16 110/68 82 84 % Abnormal  -- -- -- --   05/27/23 20:46:38 99 1 °F (37 3 °C) 86 17 93/67 76 69 % Abnormal  -- -- --      Date/Time Temp Pulse Resp BP MAP (mmHg) SpO2 O2 Flow Rate (L/min) O2 Device   05/27/23 17:28:19 97 4 °F (36 3 °C) Abnormal  73 20 119/66 84 100 % -- --   05/27/23 10:55:50 -- 90 -- 113/72 86 96 % -- --   05/27/23 1045 98 °F (36 7 °C) 100 18 94/58 -- 93 % -- None (Room air)   05/27/23 1030 97 7 °F (36 5 °C) 95 18 101/59 -- 98 % -- None (Room air)   05/27/23 1027 97 2 °F (36 2 °C) Abnormal  88 18 97/59 -- 96 % 5 L/min Simple mask   05/27/23 07:25:50 97 9 °F (36 6 °C) 67 -- 104/63 77 -- -- --   05/26/23 21:46:17 98 4 °F (36 9 °C) -- 16 93/58 70 -- -- --   05/26/23 16:21:13 97 3 °F (36 3 °C) Abnormal  -- -- 98/66 77 -- -- --   05/26/23 1600 -- 80 16 98/53 73 97 % -- None (Room air)   05/26/23 1518 -- 84 18 108/51 -- 96 % -- None (Room air)     Pertinent Labs/Diagnostic Test Results:   CT abdomen pelvis wo contrast   Final Result by Marcos Miramontes MD (05/26 1530)         1  Moderate right and mild to moderate left hydronephrosis with ureteral stent in place  Persistent hydronephrosis raises concern for malfunctioning of the catheters  2  Nonobstructing left renal calculi  3  Persistent wall thickening of the urinary bladder concerning for cystitis  The study was marked in [facility] for immediate notification           Workstation performed: [id number]         IR nephrostomy tube placement    (5/27   Ultrasound and fluoroscopic guided bilateral 10 2 Hebrew PCN placement           Results from last 7 days   Lab Units 05/27/23  0213 05/26/23  1820 05/26/23  1517 05/22/23  1154   HEMATOCRIT % 24 2* 29 1* 26 8* 29 8*   HEMOGLOBIN g/dL 7 2* 8 7* 8 0* 8 7*   NEUTROS ABS Thousands/µL 7 12  --  7 18 8 13*   PLATELETS Thousands/uL 254 309 273 401*   WBC Thousand/uL 9 03 9 84 8 98 10 04         Results from last 7 days   Lab Units 05/27/23 0213 05/26/23  1517 05/25/23  0819 05/22/23  1154   ANION GAP mmol/L 11 10 11 5   BUN mg/dL 75* 73* 73* 63*   CALCIUM mg/dL 8 2* 8 4 8 8 9 1   CHLORIDE mmol/L 109* 105 106 113*   CO2 mmol/L 16* 18* 18* 16*   CREATININE mg/dL 3 32* 3 17* 3 06* 3 15*   EGFR ml/min/1 73sq m 13 14 15 14   POTASSIUM mmol/L 4 1 4 1 3 8 3 8   MAGNESIUM mg/dL 1 8*  --   --   --    PHOSPHORUS mg/dL 5 6*  --  5 3* 4 3*   SODIUM mmol/L 136 133* 135 134*     Creatinine 0 60 - 1 30 mg/dL 3 00 High     Date-5/28/23       Results from last 7 days   Lab Units 05/27/23 0213 05/26/23  1517 05/25/23  0819 05/22/23  1154   ALBUMIN g/dL 3 0* 3 3* 3 4* 2 8*   ALK PHOS U/L 93 104  --   --    ALT U/L 5* 5*  --   --    AST U/L 7* 7*  --   --    TOTAL BILIRUBIN mg/dL 0 22 0 22  --   --    TOTAL PROTEIN g/dL 7 0 7 5  --   --          Results from last 7 days   Lab Units 05/27/23 0213 05/26/23  1517 05/22/23  1154   GLUCOSE RANDOM mg/dL 83 88 89               Results from last 7 days   Lab Units 05/27/23  0208 05/26/23  1820   INR   --  1 09   PROTIME seconds  --  14 4   PTT seconds 59* 49*     Results from last 7 days   Lab Units 05/22/23  1154   TSH 3RD GENERATON uIU/mL 1 760                         Results from last 7 days   Lab Units 05/22/23  1154   IRON SATURATION % 28   FERRITIN ng/mL 1,036*   IRON ug/dL 61   TIBC ug/dL 218*             Results from last 7 days   Lab Units 05/23/23  1011   URINE CULTURE  >100,000 cfu/ml                   ED Treatment:   Medication Administration from 05/26/2023 1211 to 05/26/2023 1610     None        Past Medical History: Diagnosis Date   • Anxiety    • CKD (chronic kidney disease) stage 3, GFR 30-59 ml/min ([facility])    • COVID     Fall 2022   • Depression    • End[facility]rial cancer ([facility] ) 12/2017   • GERD (gastroesophageal reflux disease)    • H/O gastric bypass    • Hard to intubate     pt denies   • History of chemotherapy     started 12/2021endometrial cancer- done 12/23/22   • History of DVT in adulthood     RLE   • Hyperglycemia     vx type 2 dm -- last assessed 4/1/14; resolved 11/7/17   • Hyperlipidemia    • Hypertension     in past- no meds at present   • Iron deficiency anemia     iron infusions weekly   • OAB (overactive bladder)    • Port-A-Cath in place    • Wears glasses      Present on Admission:  • Endometrial cancer ([facility] )  • Acute on chronic kidney disease  • Major depression, chronic  • Anemia  • Constipation      Admitting Diagnosis: Hydronephrosis [N13 30]  Flank pain [R10 9]  Age/Sex: 74 y o  female  Admission Orders:  Scheduled Medications:  ARIPiprazole, 20 mg, Oral, Daily  aspirin, 81 mg, Oral, QPM  multivitamin stress formula, 1 tablet, Oral, Daily  oxybutynin, 15 mg, Oral, HS  pantoprazole, 40 mg, Oral, Early Morning  polyethylene glycol, 17 g, Oral, Daily  senna-docusate sodium, 1 tablet, Oral, BID  sodium bicarbonate, 650 mg, Oral, TID  venlafaxine, 75 mg, Oral, TID      Continuous IV Infusions:  heparin (porcine), 3-20 Units/kg/hr (Order-Specific), Intravenous, Titrated  sodium chloride, 75 mL/hr, Intravenous, Continuous      PRN Meds:  acetaminophen, 650 mg, Oral, Q6H PRN x2 5/27  diphenhydrAMINE, 12 5 mg, Intravenous, Once PRN  fentaNYL, 25 mcg, Intravenous, Q5 Min PRN  heparin (porcine), 1,500 Units, Intravenous, Q6H PRN x1 5/27  heparin (porcine), 3,000 Units, Intravenous, Q6H PRN  HYDROmorphone, 0 2 mg, Intravenous, Q5 Min PRN  ondansetron, 4 mg, Intravenous, Once PRN    OOB as acosta   SCD's   continuous pulse ox   Bilat IR tubes flush with 10 ml NSS daily        INPATIENT CONSULT TO IR  IP CONSULT TO UROLOGY    Network Utilization Review Department  ATTENTION: Please call with any questions or concerns to [phone] and carefully listen to the prompts so that you are directed to the right person  All voicemails are confidential   Ratna Jimenez all requests for admission clinical reviews, approved or denied determinations and any other requests to dedicated fax number below belonging to the campus where the patient is receiving treatment   List of dedicated fax numbers for the Facilities:  [address] DENIALS (Administrative/Medical Necessity) [phone]   [address] (Maternity/NICU/Pediatrics) [phone]   [address] Azalia Luz [phone]   [facility] [phone]   [address] [address] [address] [phone]   1554 [facility] [address] 815 [facility] [phone]

## 2023-05-27 NOTE — ANESTHESIA POSTPROCEDURE EVALUATION
Post-Op Assessment Note    CV Status:  Stable  Pain Score: 0    Pain management: adequate     Mental Status:  Alert and awake   Hydration Status:  Euvolemic   PONV Controlled:  Controlled   Airway Patency:  Patent      Post Op Vitals Reviewed: Yes      Staff: CRNA         No notable events documented      BP 97/59 (05/27/23 1027)    Temp (!) 97 2 °F (36 2 °C) (05/27/23 1027)    Pulse 55 (05/27/23 1027)   Resp 18 (05/27/23 1027)    SpO2 100%

## 2023-05-27 NOTE — DISCHARGE INSTRUCTIONS
Nephrostomy Tube Care     WHAT YOU NEED TO KNOW:   A nephrostomy tube is a catheter (thin plastic tube) that is inserted through your skin and into your kidney  The nephrostomy tube drains urine from your kidney into a collecting bag outside your body  You may need a nephrostomy tube when something is blocking the normal flow of urine  A nephrostomy tube may be used for a short or a long period of time  The nephrostomy tube comes out of your back, so you will need someone to help care for your nephrostomy tube  DISCHARGE INSTRUCTIONS:      How to clean the skin around the nephrostomy tube and change the bandage:  Since the nephrostomy tube comes out of your back, you will not be able to care for it by yourself  Ask someone to follow the general directions below to check and care for your nephrostomy tube  Gather the items you will need  Disposable (single use) under-pad, and a clean washcloth  Plain soap, warm water, and new medical gloves  Sterile gauze bandages  Clear adhesive dressing or medical tape  Skin barrier  Protective skin film  Trash bag  Remove the old bandage, and check the tube entry site  Have the patient lie on his side with the nephrostomy tube entry site facing up  Place the under-pad where it will catch drainage as you are working with the nephrostomy tube  Wash your hands with soap and water  Put on new medical gloves  Gently remove the old bandage, without pulling on the tube  Do this by holding the skin beside the tube with one hand  With the other hand, gently remove sticky tape and the skin barrier by pulling in the same direction as hair growth  Do not touch the side of the bandage that is placed over or around the tube  Throw the bandage and skin barrier away in a trash bag  Look for signs of infection, such as skin redness and swelling  Report any skin changes to healthcare providers  Clean the tube entry site      Hold the tube in place to keep it from being pulled out while you are cleaning around it  You will need to clean the area twice  For the first cleaning, wet a new gauze bandage with soap and water  Begin at the entry site of the tube  Wipe the skin in circles, moving away from the entry site  Remove blood and any other material with the gauze  Do this as often as needed  Use a new gauze bandage each time you clean the area, moving away from the entry site  For the second cleaning, wet a new gauze bandage with water  Begin at the entry site of the tube  Wipe the skin in circles, moving away from the entry site  Use a new gauze bandage each time you clean the area, moving away from the entry site  Gently pat the skin with a clean washcloth to dry it  Apply the skin barrier and bandages  Roll up a bandage to make it thick, and place it under  the place where the tube enters the skin  Place it to support the tube, and stop it from kinking or bending  Tape the bandage in place, and apply more bandages if directed by a healthcare provider  Bring the tubing forward to the front and tape it to the skin  Do not stretch the tube tight, because this may pull the nephrostomy tube out  How often to change the bandage  Change the bandage around the tube, every other day  If your bandages  get dirty or wet, change them right away, and as often as needed  If your nephrostomy tube is to be used for a long period of time, the tube needs to be changed every 2 to 3 months  Healthcare providers will tell you when you need to make an appointment to have your tube changed  How to care for the urine drainage bag:   Ask if you need to measure and write down how much urine is in the bag before you empty it  Drain urine out of the drainage bag when it is ½ to ? full  Open the spout at the bottom of the bag to empty the urine into the toilet  You may need to detach the drainage bag from the nephrostomy tube to change it    If so, attach a new drainage bag tightly to the nephrostomy tube  How to prevent problems with your nephrostomy tube:   Change bandages, directed  This helps to prevent infection  Throw away or clean your drainage bag as directed by your healthcare provider  Wipe the connecting ends of the drainage bag with alcohol before you reconnect the bag to the tube  This helps prevent infection  Keep the tube taped to your skin and connected to a drainage bag placed below the level of your kidneys  This helps prevent urine from backing up into your kidneys  You may wear a small drainage bag strapped to your leg to let you move around more easily  Check the catheter to be sure it is in place after you change your clothes or do other activities  Do not wear tight clothing over the tube  Place the tubing over your thigh rather than under it when you are sitting down  Be sure that nothing is pulling on the nephrostomy tube when you move around  Change positions if you see little or no urine in your drainage bag  Check to see if the urine tube is twisted or bent  Be sure that you are not sitting or lying on the tube  If there are no kinks and there is little or no urine in the drainage bag, tell your healthcare provider  Flush out the tube as directed  Some tubes get flushed one time a day with 10 mls of NSS You will be given a prescription for the flushes  To flush the nephrostomy tube, clean both connections with alcohol swap  Twist off the drainage bag tube and twist the saline syringe into the nephrostomy tube and flush briskly  Remove the syringe and twist the drainage bag tube back into the nephrostomy tube  Keep the site covered while you shower  Tape a piece of clear adhesive plastic over the dressing to keep it dry while you shower  Do not take tub baths      Contact Interventional Radiology at [phone] Angelique PATIENTS: Contact Interventional Radiology at [phone]) Randi Christiansen PATIENTS: Contact Interventional Radiology at [phone]) if:  The skin around the nephrostomy tube is red, swollen, itches, or has a rash  You have a fever greater than 101 or chills  You have lower back or hip pain  There are changes in how your urine looks or smells  You have little or no urine draining from the nephrostomy tube  You have nausea and are vomiting  The black bernardo on your tube has moved, or the tube is longer than when it was put in  You have questions or concerns about your condition or care  The nephrostomy tube comes out completely  There is blood, pus, or a bad smell coming from the place where the tube enters your skin  Urine is leaking around the tube  The following pharmacies carry the flush syringes  [facility]                     [facility]       [address]  Phone [phone]            Phone [phone]  [address] & [facility]                      [address]                                 [phone]  Phone [phone]            Phone [phone]    [facility]                                                                         [facility] [phone]  [address]   Phone [phone]

## 2023-05-27 NOTE — PLAN OF CARE
Problem: PAIN - ADULT  Goal: Verbalizes/displays adequate comfort level or baseline comfort level  Description: Interventions:  - Encourage patient to monitor pain and request assistance  - Assess pain using appropriate pain scale  - Administer analgesics based on type and severity of pain and evaluate response  - Implement non-pharmacological measures as appropriate and evaluate response  - Consider cultural and social influences on pain and pain management  - Notify physician/advanced practitioner if interventions unsuccessful or patient reports new pain  Outcome: Progressing     Problem: INFECTION - ADULT  Goal: Absence or prevention of progression during hospitalization  Description: INTERVENTIONS:  - Assess and monitor for signs and symptoms of infection  - Monitor lab/diagnostic results  - Monitor all insertion sites, i e  indwelling lines, tubes, and drains  - Monitor endotracheal if appropriate and nasal secretions for changes in amount and color  - [place] appropriate cooling/warming therapies per order  - Administer medications as ordered  - Instruct and encourage patient and family to use good hand hygiene technique  - Identify and instruct in appropriate isolation precautions for identified infection/condition  Outcome: Progressing  Goal: Absence of fever/infection during neutropenic period  Description: INTERVENTIONS:  - Monitor WBC    Outcome: Progressing     Problem: SAFETY ADULT  Goal: Patient will remain free of falls  Description: INTERVENTIONS:  - Educate patient/family on patient safety including physical limitations  - Instruct patient to call for assistance with activity   - Consult OT/PT to assist with strengthening/mobility   - Keep Call bell within reach  - Keep bed low and locked with side rails adjusted as appropriate  - Keep care items and personal belongings within reach  - Initiate and maintain comfort rounds  - Make Fall Risk Sign visible to staff  - Offer Toileting every  Hours, in advance of need  - Initiate/Maintain alarm  - Obtain necessary fall risk management equipment:   - Apply yellow socks and bracelet for high fall risk patients  - Consider moving patient to room near nurses station  Outcome: Progressing  Goal: Maintain or return to baseline ADL function  Description: INTERVENTIONS:  -  Assess patient's ability to carry out ADLs; assess patient's baseline for ADL function and identify physical deficits which impact ability to perform ADLs (bathing, care of mouth/teeth, toileting, grooming, dressing, etc )  - Assess/evaluate cause of self-care deficits   - Assess range of motion  - Assess patient's mobility; develop plan if impaired  - Assess patient's need for assistive devices and provide as appropriate  - Encourage maximum independence but intervene and supervise when necessary  - Involve family in performance of ADLs  - Assess for home care needs following discharge   - Consider OT consult to assist with ADL evaluation and planning for discharge  - Provide patient education as appropriate  Outcome: Progressing  Goal: Maintains/Returns to pre admission functional level  Description: INTERVENTIONS:  - Perform BMAT or MOVE assessment daily    - Set and communicate daily mobility goal to care team and patient/family/caregiver  - Collaborate with rehabilitation services on mobility goals if consulted  - Perform Range of Motion 3 times a day  - Reposition patient every 3 hours    - Dangle patient 3 times a day  - Stand patient 3 times a day  - Ambulate patient 3 times a day  - Out of bed to chair 3 times a day   - Out of bed for meals 3 times a day  - Out of bed for toileting  - Record patient progress and toleration of activity level   Outcome: Progressing     Problem: DISCHARGE PLANNING  Goal: Discharge to home or other facility with appropriate resources  Description: INTERVENTIONS:  - Identify barriers to discharge w/patient and caregiver  - Arrange for needed discharge resources and transportation as appropriate  - Identify discharge learning needs (meds, wound care, etc )  - Arrange for interpretive services to assist at discharge as needed  - Refer to Case Management Department for coordinating discharge planning if the patient needs post-hospital services based on physician/advanced practitioner order or complex needs related to functional status, cognitive ability, or social support system  Outcome: Progressing     Problem: Knowledge Deficit  Goal: Patient/family/caregiver demonstrates understanding of disease process, treatment plan, medications, and discharge instructions  Description: Complete learning assessment and assess knowledge base    Interventions:  - Provide teaching at level of understanding  - Provide teaching via preferred learning methods  Outcome: Progressing

## 2023-05-27 NOTE — ASSESSMENT & PLAN NOTE
Diagnosed with DVT in 2019, per patient, failed PO treatment with recurrence of dvt and was placed on Lovenox BID  B/l venous duplex performed during last admission did not show any DVT and patient was advised to discuss the Lovenox with her gyn-onc  Plan  given MONROE, will place patient on heparin gtt  Continue monitoring

## 2023-05-27 NOTE — ASSESSMENT & PLAN NOTE
Recent Labs     05/25/23  0819 05/26/23  1517 05/27/23  0213   CREATININE 3 06* 3 17* 3 32*   EGFR 15 14 13     · Previous baseline creatinine 1 1  · Recently admitted for MONROE in the setting of obstructed ureteral stent, status post stent replacement  · Cr on discharge on 5/13/2022 was 2 47   · Cr POA of 3 17   · Was sent in to the ED for imaging and bilateral nephrostomy tube placement with IR     CT of the abdomen and pelvis demonstrated moderate right and mild to moderate left hydronephrosis with ureteral stents in place  Patient is status post bilateral nephrostomy tube placed by interventional radiology this admission 5/27      Plan:  · Trend BMP daily  · Monitor for signs of infection  · Strict I's/O's  · Replete fluids as needed, as postobstructive diuresis likely to occur  · Urology following, appreciate recommendations  · Possible conversion of PCN to nephroureteral stents in the outpatient setting

## 2023-05-27 NOTE — PROGRESS NOTES
[facility]  Progress Note  Name: Melanie Marks  MRN: [id number]  Unit/Bed#: W -01 I Date of Admission: 5/26/2023   Date of Service: 5/27/2023 I Hospital Day: 1    Assessment/Plan   * Acute on chronic kidney disease  Assessment & Plan  Recent Labs     05/25/23  0819 05/26/23  1517 05/27/23  0213   CREATININE 3 06* 3 17* 3 32*   EGFR 15 14 13     Estimated Creatinine Clearance: 13 mL/min (A) (by C-G formula based on SCr of 3 32 mg/dL (H))  With creatinine POA of 3 17  Recently admitted for MONROE in the setting of obstructed ureteral stent, status post stent replacement  Creatinine at discharge on 5/13/2022 was 2 47, now as high as 3 17  Per her urologist, this is likely in the setting of recurrent ureteral stent obstruction  Was sent in to the ED today for imaging and bilateral nephrostomy tube placement with IR   CT of the abdomen and pelvis demonstrated moderate right and mild to moderate left hydronephrosis with ureteral stents in place  Persistent wall thickening of bladder concerning for cystitis which I suspect is likely in the setting of radiation  Was also being worked up by outpatient nephrologist for this and was going to undergo kidney biopsy if no improvement after ureteral stent exchange after last admission  Per Dr Sheila Ch this may also be due to glomerulopathy vs side effect of ENDOBARR treatment  Patient is status post bilateral nephrostomy tube placed by interventional radiology  Appreciate intervention  Creatinine still elevated however these labs were done prior to nephrostomy tube placement  Monitor BMP in the morning  History of DVT (deep vein thrombosis)  Assessment & Plan  Diagnosed with DVT in 2019, per patient, failed PO treatment with recurrence of dvt and was placed on Lovenox BID  B/l venous duplex performed during last admission did not show any DVT and patient was advised to discuss the Lovenox with her gyn-onc     At this time given MONROE, will place patient on heparin gtt  Continue monitoring  -Heparin drip on hold until 6 status post bilateral nephrostomy tube insertion  As per IR recommendations  We will transition the patient to oral rate since once creatinine is slightly improved  Constipation  Assessment & Plan  Patient reports constipation at baseline for which she takes MiraLAX and senna  We will order bowel regimen    Major depression, chronic  Assessment & Plan  Continue PTA aripiprazole 20 mg daily and venlafaxine 75 mg 3 times daily  Pharmacologic VTE Prophylaxis: Yes   Mechanical VTE Prophylaxis in Place: No   Patient Centered Rounds: I have performed bedside rounds with the Nursing staff today  Current Length of Stay: 1 day(s)  Current Patient Status: Inpatient   Code Status: Level 1 - Full Code  Time Spent for Care:  35 minutes  More than 50% of total time spent on counseling and coordination of care as described above  Discussions with Specialists or Other Care Team Provider: Yes   Education and Discussions with Family / Patient: No , Patient refused  Discharge Plan: 24 to 48 hrs   Case Discussed with  regarding updating plan of care and disposition planning  Certification Statement: The patient will continue to require additional inpatient hospital stay due to Acute kidney injury on chronic kidney disease    Subjective:   I have seen and Examined the patient at the bedside  No CP or Sob  No fevers or chills, No nausea or vomiting  Overnight events reviewed with the RN  No Other complains  Patient is s/p IR B/l Nephrostomy tube  Mild soreness  Review of System:   Denies any CP or SOB  Denies any Cough or Cold  Denies any Fevers or chills  Denies any focal tingling numbness or weakness in any extremities  Denies any abdominal pain, Nausea or vomiting       Objective:   Temp (24hrs), Av 8 °F (36 6 °C), Min:97 2 °F (36 2 °C), Max:98 4 °F (36 9 °C)    Temp:  [97 2 °F (36 2 °C)-98 4 °F (36 9 °C)] 98 °F (36 7 °C)  HR:  [] 90  Resp:  [16-18] 18  BP: ()/(58-72) 113/72  SpO2:  [93 %-98 %] 96 %  There is no height or weight on file to calculate BMI  Input and Output Summary (last 24 hours): Intake/Output Summary (Last 24 hours) at 5/27/2023 1622  Last data filed at 5/27/2023 1300  Gross per 24 hour   Intake 545 7 ml   Output 820 ml   Net -274 3 ml     I/O       05/25 0701 05/26 0700 05/26 0701  05/27 0700 05/27 0701 05/28 0700    I V   45 7 500    Total Intake  45 7 500    Urine  400 420    Total Output  400 420    Net  -354 3 +80                 Physical Exam:   General : Alert, Awake and oriented x 2-3, NAD  Neck : Supple  Eyes:  FLY, EOMI  CVS : S1, S2, RRR    R/S : Clear to auscultate anteriorly  Abd: Soft, NT, Distended  Bs+ve, B/l Nephrostomy tubes noted   Extremity: Trace pedal edema noted  Skin: No acute Rash noted  CNS: No acute FND  Additional Data:     Labs, Culture & Imaging Data Reviewed:    Results from last 7 days   Lab Units 05/27/23  0213   HEMATOCRIT % 24 2*   HEMOGLOBIN g/dL 7 2*   PLATELETS Thousands/uL 254   WBC Thousand/uL 9 03     Results from last 7 days   Lab Units 05/27/23  0213   ALK PHOS U/L 93   ALT U/L 5*   AST U/L 7*   BUN mg/dL 75*   CALCIUM mg/dL 8 2*   CHLORIDE mmol/L 109*   CO2 mmol/L 16*   CREATININE mg/dL 3 32*   POTASSIUM mmol/L 4 1     Results from last 7 days   Lab Units 05/26/23  1820   INR  1 09     Lab Results   Component Value Date    HGBA1C 5 2 10/31/2022      CT abdomen pelvis wo contrast   Final Result by Shane Tellez MD (05/26 1530)         1  Moderate right and mild to moderate left hydronephrosis with ureteral stent in place  Persistent hydronephrosis raises concern for malfunctioning of the catheters  2  Nonobstructing left renal calculi  3  Persistent wall thickening of the urinary bladder concerning for cystitis  The study was marked in [facility] for immediate notification           Workstation "performed: [id number]         IR nephrostomy tube placement    (Results Pending)   IR nephrostomy tube check/change/reposition/reinsertion/upsize    (Results Pending)       Cultures:   Blood Culture:   Lab Results   Component Value Date    BLOODCX No Growth After 5 Days  05/10/2023    BLOODCX No Growth After 5 Days   05/10/2023     Urine Culture:   Lab Results   Component Value Date    URINECX >100,000 cfu/ml 05/23/2023    URINECX 70,000-79,000 cfu/ml Staphylococcus aureus (A) 05/09/2023    URINECX >100,000 cfu/ml Candida albicans (A) 05/09/2023     Sputum Culture: No components found for: \"SPUTUMCX\"  Wound Culture: No results found for: \"WOUNDCULT\"    Last 24 Hours Medication List:   Current Facility-Administered Medications   Medication Dose Route Frequency Provider Last Rate   • acetaminophen  650 mg Oral Q6H PRN Claudine Tom MD     • ARIPiprazole  20 mg Oral Daily Claudine Tom MD     • aspirin  81 mg Oral QPM Claudine Tom MD     • diphenhydrAMINE  12 5 mg Intravenous Once PRN Latasha Byrd CRNA     • fentaNYL  25 mcg Intravenous Q5 Min PRN Latasha Byrd CRNA     • heparin (porcine)  3-20 Units/kg/hr (Order-Specific) Intravenous Titrated Herlinda Andujar MD     • heparin (porcine)  1,500 Units Intravenous Q6H PRN Claudine Tom MD     • heparin (porcine)  3,000 Units Intravenous Q6H PRN Claudine Tom MD     • HYDROmorphone  0 2 mg Intravenous Q5 Min PRN Latasha Byrd CRNA     • multivitamin stress formula  1 tablet Oral Daily Claudine Tom MD     • ondansetron  4 mg Intravenous Once PRN Latasha Byrd CRNA     • oxybutynin  15 mg Oral HS Claudine Tom MD     • pantoprazole  40 mg Oral Early Morning Claudine Tom MD     • polyethylene glycol  17 g Oral Daily Claudine Tom MD     • senna-docusate sodium  1 tablet Oral BID Claudine Tom MD     • sodium bicarbonate  650 mg Oral TID Claudine Tom MD     • sodium chloride  50 mL/hr Intravenous " Continuous Marielena Hunter MD 50 mL/hr (05/27/23 7286)   • venlafaxine  75 mg Oral TID Marielena Hunter MD           Patient is at moderate risk for morbidity and mortality due to above mentioned illness and comorbidities

## 2023-05-27 NOTE — ASSESSMENT & PLAN NOTE
Diagnosed with DVT in 2019, per patient, failed PO treatment with recurrence of dvt and was placed on Lovenox BID  B/l venous duplex performed during last admission did not show any DVT and patient was advised to discuss the Lovenox with her gyn-onc  At this time given MONROE, will place patient on heparin gtt  Continue monitoring

## 2023-05-27 NOTE — BRIEF OP NOTE (RAD/CATH)
INTERVENTIONAL RADIOLOGY PROCEDURE NOTE    Date: 5/27/2023    Procedure:   Procedure Summary     Date: 05/27/23 Room / Location: [place] [address] Interventional Radiology    Anesthesia Start: 8506 Anesthesia Stop:     Procedure: IR NEPHROSTOMY TUBE PLACEMENT Diagnosis: (worsening bilateral hydro, right > left )    Scheduled Providers:  Responsible Provider: Christopher Altman MD    Anesthesia Type: general ASA Status: 3          Preoperative diagnosis:   1  Hydronephrosis         Postoperative diagnosis: Same  Surgeon: Leyla Nicole MD     Assistant: None  No qualified resident was available  Blood loss: 2 mL    Specimens: None     Findings: Ultrasound and fluoroscopic guided bilateral 10 2 [place] PCN placement  Complications: None immediate      Anesthesia: general     Recommendations:  - Maintain bag drainage of both catheters  - Flush both tubes daily with 10 mL saline  - Heparin gtt may be restarted in 6 hours  - We will plan for routine exchange in 3 months  - Please send outpatient IR referral for exchange for bilateral nephroureteral stent placement if requested

## 2023-05-27 NOTE — PROGRESS NOTES
Progress Note - Ragini Melendez 74 y o  female MRN: [id number]    Unit/Bed#: W -01 Encounter: [id number]      Assessment:  Ellie Leon is a 79-year-old female known to our service with history of endometrial cancer, bilateral ureteral radiation-induced obstruction, resultant hydronephrosis and maintain  with bilateral ureteral stents  And recent worsening renal function  She was referred to the emergency room for development of acute kidney injury and outpatient CT which demonstrated bilateral hydronephrosis despite favorable position of stents  She is admitted to the internal medicine service  She is now postprocedure day 0 IR insertion of bilateral percutaneous nephrostomies  Patient is chronically ill-appearing, but nontoxic, hemodynamically stable and without complaints of pain  Right PCN is secure and patent for cloudy bloody output  Left PCN is grossly clear  Patient is without leukocytosis  Serum creatinine today 3 32, increased since yesterday  Please note, this is prior to IR instrumentation  Plan:  · Continue medical optimization, symptom management and antibiosis  Trend labs  · Maintain PCN to straight drainage  Flush per IR protocol--10 mL normal saline daily  · Document urinary output  · Replete fluids given patient will likely experience postobstructive diuresis  · We will follow patient's clinical progress intermittently given limited urology network coverage for this holiday weekend  · There is a role for potential conversion of PCNs to nephroureteral stents  This remains to be seen and will occur in an outpatient setting  Subjective:   Denies fever, chills, flank, abdominal, suprapubic pain  Objective:     Vitals: Blood pressure 113/72, pulse 90, temperature 98 °F (36 7 °C), resp  rate 18, SpO2 96 %, not currently breastfeeding  ,There is no height or weight on file to calculate BMI        Intake/Output Summary (Last 24 hours) at 5/27/2023 9138  Last data filed at 5/27/2023 1300  Gross per 24 hour   Intake 545 7 ml   Output 820 ml   Net -274 3 ml       Physical Exam: General appearance: alert and oriented, in no acute distress, appears stated age, cooperative and no distress  Head: Normocephalic, without obvious abnormality, atraumatic  Neck: no adenopathy, no carotid bruit, no JVD, supple, symmetrical, trachea midline and thyroid not enlarged, symmetric, no tenderness/mass/nodules  Lungs: diminished breath sounds  Heart: regular rate and rhythm, S1, S2 normal, no murmur, click, rub or gallop  Abdomen: soft, non-tender; bowel sounds normal; no masses,  no organomegaly  Extremities: extremities normal, warm and well-perfused; no cyanosis, clubbing, or edema  Pulses: 2+ and symmetric  Neurologic: Grossly normal  Left PCN-status bloody  Right PCN grossly clear yellow urine     Invasive Devices     Central Venous Catheter Line  Duration           Port A Cath 11/12/19 Left Chest 1292 days          Peripheral Intravenous Line  Duration           Peripheral IV 05/26/23 Left Antecubital 1 day          Drain  Duration           Ureteral Internal Stent Right ureter 6 Fr  58 days    Ureteral Internal Stent Left ureter 7 Fr  18 days    Ureteral Internal Stent Right ureter 7 Fr  18 days    Nephrostomy Left 10 2 Fr  <1 day    Nephrostomy Right 10 2 Fr  <1 day              Lab Results   Component Value Date    HCT 24 2 (L) 05/27/2023    HGB 7 2 (L) 05/27/2023    MCV 95 05/27/2023     05/27/2023    WBC 9 03 05/27/2023     Lab Results   Component Value Date    BUN 75 (H) 05/27/2023    CALCIUM 8 2 (L) 05/27/2023     (H) 05/27/2023    CO2 16 (L) 05/27/2023    CREATININE 3 32 (H) 05/27/2023    GLUC 83 05/27/2023    K 4 1 05/27/2023    SODIUM 136 05/27/2023       Lab, Imaging and other studies: I have personally reviewed pertinent reports

## 2023-05-28 LAB
ANION GAP SERPL CALCULATED.3IONS-SCNC: 12 MMOL/L (ref 4–13)
APTT PPP: 45 SECONDS (ref 23–37)
APTT PPP: 61 SECONDS (ref 23–37)
APTT PPP: 73 SECONDS (ref 23–37)
BUN SERPL-MCNC: 65 MG/DL (ref 5–25)
CALCIUM SERPL-MCNC: 8 MG/DL (ref 8.4–10.2)
CHLORIDE SERPL-SCNC: 103 MMOL/L (ref 96–108)
CO2 SERPL-SCNC: 15 MMOL/L (ref 21–32)
CREAT SERPL-MCNC: 3 MG/DL (ref 0.6–1.3)
GFR SERPL CREATININE-BSD FRML MDRD: 15 ML/MIN/1.73SQ M
GLUCOSE SERPL-MCNC: 103 MG/DL (ref 65–140)
POTASSIUM SERPL-SCNC: 4.4 MMOL/L (ref 3.5–5.3)
SODIUM SERPL-SCNC: 130 MMOL/L (ref 135–147)

## 2023-05-28 RX ADMIN — OXYBUTYNIN CHLORIDE 15 MG: 5 TABLET, EXTENDED RELEASE ORAL at 21:19

## 2023-05-28 RX ADMIN — SENNOSIDES AND DOCUSATE SODIUM 1 TABLET: 50; 8.6 TABLET ORAL at 17:22

## 2023-05-28 RX ADMIN — SODIUM BICARBONATE 650 MG: 650 TABLET ORAL at 08:57

## 2023-05-28 RX ADMIN — VENLAFAXINE 75 MG: 37.5 TABLET ORAL at 08:57

## 2023-05-28 RX ADMIN — SODIUM CHLORIDE 75 ML/HR: 4.5 INJECTION, SOLUTION INTRAVENOUS at 05:52

## 2023-05-28 RX ADMIN — SENNOSIDES AND DOCUSATE SODIUM 1 TABLET: 50; 8.6 TABLET ORAL at 08:57

## 2023-05-28 RX ADMIN — SODIUM BICARBONATE 650 MG: 650 TABLET ORAL at 21:19

## 2023-05-28 RX ADMIN — SODIUM CHLORIDE 500 ML: 0.9 INJECTION, SOLUTION INTRAVENOUS at 12:17

## 2023-05-28 RX ADMIN — ARIPIPRAZOLE 20 MG: 5 TABLET ORAL at 08:57

## 2023-05-28 RX ADMIN — B-COMPLEX W/ C & FOLIC ACID TAB 1 TABLET: TAB at 08:57

## 2023-05-28 RX ADMIN — ASPIRIN 81 MG: 81 TABLET, COATED ORAL at 17:22

## 2023-05-28 RX ADMIN — HEPARIN SODIUM 1500 UNITS: 1000 INJECTION INTRAVENOUS; SUBCUTANEOUS at 02:54

## 2023-05-28 RX ADMIN — HEPARIN SODIUM 16 UNITS/KG/HR: 10000 INJECTION, SOLUTION INTRAVENOUS at 17:28

## 2023-05-28 RX ADMIN — SODIUM CHLORIDE 75 ML/HR: 4.5 INJECTION, SOLUTION INTRAVENOUS at 21:21

## 2023-05-28 RX ADMIN — PANTOPRAZOLE SODIUM 40 MG: 40 TABLET, DELAYED RELEASE ORAL at 05:25

## 2023-05-28 RX ADMIN — VENLAFAXINE 75 MG: 37.5 TABLET ORAL at 21:19

## 2023-05-28 RX ADMIN — VENLAFAXINE 75 MG: 37.5 TABLET ORAL at 15:57

## 2023-05-28 RX ADMIN — ACETAMINOPHEN 650 MG: 325 TABLET ORAL at 08:57

## 2023-05-28 RX ADMIN — SODIUM BICARBONATE 650 MG: 650 TABLET ORAL at 15:57

## 2023-05-28 NOTE — QUICK NOTE
PCA(Macie Escobedo) reports to RN(Beth Farias) that pt's BP is 92/56 manually  RN notified SLIM (Solitario Hebert)  Pt asymptomatic  Orthostatic BP's negative

## 2023-05-28 NOTE — ASSESSMENT & PLAN NOTE
Recent Labs     05/26/23  1517 05/27/23  0213 05/28/23  0912   CREATININE 3 17* 3 32* 3 00*   EGFR 14 13 15     · Previous baseline creatinine 1 1  · Recently admitted for MONROE in the setting of obstructed ureteral stent, status post stent replacement  · Cr on discharge on 5/13/2022 was 2 47   · Cr POA of 3 17   · Was sent in to the ED for imaging and bilateral nephrostomy tube placement with IR     CT of the abdomen and pelvis demonstrated moderate right and mild to moderate left hydronephrosis with ureteral stents in place  Patient is status post bilateral nephrostomy tube placed by interventional radiology this admission 5/27      Plan:  · Trend BMP daily  · Monitor for signs of infection  · Strict I's/O's -> monitor for post obstructive diuresis  · Replete fluids as needed  · Urology following, appreciate recommendations  · Possible conversion of PCN to nephroureteral stents in the outpatient setting

## 2023-05-28 NOTE — PROGRESS NOTES
Progress Note - Ragini Melendez 74 y o  female MRN: [id number]    Unit/Bed#: W -01 Encounter: [id number]      Assessment:  Jessica Christiansen is a 72-year-old female known to our service with history of endometrial cancer, bilateral ureteral radiation-induced obstruction, resultant hydronephrosis and maintain  with bilateral ureteral stents  And recent worsening renal function  She was referred to the emergency room for development of acute kidney injury and outpatient CT which demonstrated bilateral hydronephrosis despite favorable position of stents  She is admitted to the internal medicine service  She is now postprocedure day 1 IR insertion of bilateral percutaneous nephrostomies  Patient is chronically ill-appearing, but nontoxic, hemodynamically stable and without complaints of pain  Right PCN is secure and patent for cloudy bloody output  Left PCN is grossly clear  Greater than 2 L output in the past 24 hours  Morning BMP-- to be collected  Plan:  · Medical optimization and symptom management  · Bilateral PCNs to straight drainage  Flush per IR protocol  · Document all urinary output  · Replete fluids--we will see substantial postobstructive diuresis  · Trend labs  Outpatient follow-up and consider IR PCN conversion if feasible once patient is medically optimized  Not during hospitalization however  · We will follow intermittently  Subjective:   Denies fever, chills, flank, abdominal, suprapubic pain  Objective:     Vitals: Blood pressure 103/60, pulse 88, temperature 100 4 °F (38 °C), resp  rate 16, SpO2 98 %, not currently breastfeeding  ,There is no height or weight on file to calculate BMI        Intake/Output Summary (Last 24 hours) at 5/28/2023 0902  Last data filed at 5/28/2023 0756  Gross per 24 hour   Intake 1323 75 ml   Output 2295 ml   Net -971 25 ml       Physical Exam:  General appearance: alert and oriented, in no acute distress, appears stated age, cooperative and no distress  Head: Normocephalic, without obvious abnormality, atraumatic  Neck: no adenopathy, no carotid bruit, no JVD, supple, symmetrical, trachea midline and thyroid not enlarged, symmetric, no tenderness/mass/nodules  Lungs: diminished breath sounds  Heart: regular rate and rhythm, S1, S2 normal, no murmur, click, rub or gallop  Abdomen: soft, non-tender; bowel sounds normal; no masses,  no organomegaly  Extremities: extremities normal, warm and well-perfused; no cyanosis, clubbing, or edema  Pulses: 2+ and symmetric  Neurologic: Grossly normal  Right PCN-/bloody  Left PCN grossly pale yellow urine      Invasive Devices     Central Venous Catheter Line  Duration           Port A Cath 11/12/19 Left Chest 1292 days          Peripheral Intravenous Line  Duration           Peripheral IV 05/26/23 Left Antecubital 1 day          Drain  Duration           Ureteral Internal Stent Right ureter 6 Fr  59 days    Ureteral Internal Stent Left ureter 7 Fr  18 days    Ureteral Internal Stent Right ureter 7 Fr  18 days    Nephrostomy Left 10 2 Fr  <1 day    Nephrostomy Right 10 2 Fr  <1 day              Lab Results   Component Value Date    HCT 24 2 (L) 05/27/2023    HGB 7 2 (L) 05/27/2023    MCV 95 05/27/2023     05/27/2023    WBC 9 03 05/27/2023     Lab Results   Component Value Date    BUN 75 (H) 05/27/2023    CALCIUM 8 2 (L) 05/27/2023     (H) 05/27/2023    CO2 16 (L) 05/27/2023    CREATININE 3 32 (H) 05/27/2023    GLUC 83 05/27/2023    K 4 1 05/27/2023    SODIUM 136 05/27/2023       Lab, Imaging and other studies: I have personally reviewed pertinent reports

## 2023-05-28 NOTE — PLAN OF CARE
Problem: PAIN - ADULT  Goal: Verbalizes/displays adequate comfort level or baseline comfort level  Description: Interventions:  - Encourage patient to monitor pain and request assistance  - Assess pain using appropriate pain scale  - Administer analgesics based on type and severity of pain and evaluate response  - Implement non-pharmacological measures as appropriate and evaluate response  - Consider cultural and social influences on pain and pain management  - Notify physician/advanced practitioner if interventions unsuccessful or patient reports new pain  Outcome: Progressing     Problem: INFECTION - ADULT  Goal: Absence or prevention of progression during hospitalization  Description: INTERVENTIONS:  - Assess and monitor for signs and symptoms of infection  - Monitor lab/diagnostic results  - Monitor all insertion sites, i e  indwelling lines, tubes, and drains  - Monitor endotracheal if appropriate and nasal secretions for changes in amount and color  - [place] appropriate cooling/warming therapies per order  - Administer medications as ordered  - Instruct and encourage patient and family to use good hand hygiene technique  - Identify and instruct in appropriate isolation precautions for identified infection/condition  Outcome: Progressing  Goal: Absence of fever/infection during neutropenic period  Description: INTERVENTIONS:  - Monitor WBC    Outcome: Progressing     Problem: SAFETY ADULT  Goal: Patient will remain free of falls  Description: INTERVENTIONS:  - Educate patient/family on patient safety including physical limitations  - Instruct patient to call for assistance with activity   - Consult OT/PT to assist with strengthening/mobility   - Keep Call bell within reach  - Keep bed low and locked with side rails adjusted as appropriate  - Keep care items and personal belongings within reach  - Initiate and maintain comfort rounds  - Make Fall Risk Sign visible to staff  - Offer Toileting every 2 Hours, in advance of need  - Initiate/Maintain bed/chair alarm  - Apply yellow socks and bracelet for high fall risk patients  - Consider moving patient to room near nurses station  Outcome: Progressing  Goal: Maintain or return to baseline ADL function  Description: INTERVENTIONS:  -  Assess patient's ability to carry out ADLs; assess patient's baseline for ADL function and identify physical deficits which impact ability to perform ADLs (bathing, care of mouth/teeth, toileting, grooming, dressing, etc )  - Assess/evaluate cause of self-care deficits   - Assess range of motion  - Assess patient's mobility; develop plan if impaired  - Assess patient's need for assistive devices and provide as appropriate  - Encourage maximum independence but intervene and supervise when necessary  - Involve family in performance of ADLs  - Assess for home care needs following discharge   - Consider OT consult to assist with ADL evaluation and planning for discharge  - Provide patient education as appropriate  Outcome: Progressing  Goal: Maintains/Returns to pre admission functional level  Description: INTERVENTIONS:  - Perform BMAT or MOVE assessment daily    - Set and communicate daily mobility goal to care team and patient/family/caregiver     - Collaborate with rehabilitation services on mobility goals if consulted  - Stand patient 6 times a day  - Ambulate patient 4 times a day  - Out of bed to chair 3 times a day   - Out of bed for meals 3 times a day  - Out of bed for toileting  - Record patient progress and toleration of activity level   Outcome: Progressing     Problem: DISCHARGE PLANNING  Goal: Discharge to home or other facility with appropriate resources  Description: INTERVENTIONS:  - Identify barriers to discharge w/patient and caregiver  - Arrange for needed discharge resources and transportation as appropriate  - Identify discharge learning needs (meds, wound care, etc )  - Arrange for interpretive services to assist at discharge as needed  - Refer to Case Management Department for coordinating discharge planning if the patient needs post-hospital services based on physician/advanced practitioner order or complex needs related to functional status, cognitive ability, or social support system  Outcome: Progressing     Problem: Knowledge Deficit  Goal: Patient/family/caregiver demonstrates understanding of disease process, treatment plan, medications, and discharge instructions  Description: Complete learning assessment and assess knowledge base    Interventions:  - Provide teaching at level of understanding  - Provide teaching via preferred learning methods  Outcome: Progressing     Problem: Prexisting or High Potential for Compromised Skin Integrity  Goal: Skin integrity is maintained or improved  Description: INTERVENTIONS:  - Identify patients at risk for skin breakdown  - Assess and monitor skin integrity  - Assess and monitor nutrition and hydration status  - Monitor labs   - Assess for incontinence   - Turn and reposition patient  - Assist with mobility/ambulation  - Relieve pressure over bony prominences  - Avoid friction and shearing  - Provide appropriate hygiene as needed including keeping skin clean and dry  - Evaluate need for skin moisturizer/barrier cream  - Collaborate with interdisciplinary team   - Patient/family teaching  - Consider wound care consult   Outcome: Progressing     Problem: MOBILITY - ADULT  Goal: Maintain or return to baseline ADL function  Description: INTERVENTIONS:  -  Assess patient's ability to carry out ADLs; assess patient's baseline for ADL function and identify physical deficits which impact ability to perform ADLs (bathing, care of mouth/teeth, toileting, grooming, dressing, etc )  - Assess/evaluate cause of self-care deficits   - Assess range of motion  - Assess patient's mobility; develop plan if impaired  - Assess patient's need for assistive devices and provide as appropriate  - Encourage maximum independence but intervene and supervise when necessary  - Involve family in performance of ADLs  - Assess for home care needs following discharge   - Consider OT consult to assist with ADL evaluation and planning for discharge  - Provide patient education as appropriate  Outcome: Progressing  Goal: Maintains/Returns to pre admission functional level  Description: INTERVENTIONS:  - Perform BMAT or MOVE assessment daily    - Set and communicate daily mobility goal to care team and patient/family/caregiver     - Collaborate with rehabilitation services on mobility goals if consulted  - Stand patient 6 times a day  - Ambulate patient 4 times a day  - Out of bed to chair 3 times a day   - Out of bed for meals 3 times a day  - Out of bed for toileting  - Record patient progress and toleration of activity level   Outcome: Progressing

## 2023-05-28 NOTE — PROGRESS NOTES
[facility]  Progress Note  Name: Ander Jacques  MRN: [id number]  Unit/Bed#: W -01 I Date of Admission: 5/26/2023   Date of Service: 5/28/2023 I Hospital Day: 2    Assessment/Plan   * Acute on chronic kidney disease  Assessment & Plan  Recent Labs     05/26/23  1517 05/27/23  0213 05/28/23  0912   CREATININE 3 17* 3 32* 3 00*   EGFR 14 13 15     · Previous baseline creatinine 1 1  · Recently admitted for MONROE in the setting of obstructed ureteral stent, status post stent replacement  · Cr on discharge on 5/13/2022 was 2 47   · Cr POA of 3 17   · Was sent in to the ED for imaging and bilateral nephrostomy tube placement with IR     CT of the abdomen and pelvis demonstrated moderate right and mild to moderate left hydronephrosis with ureteral stents in place  Patient is status post bilateral nephrostomy tube placed by interventional radiology this admission 5/27  Plan:  · Trend BMP daily  · Monitor for signs of infection  · Strict I's/O's -> monitor for post obstructive diuresis  · Replete fluids as needed  · Urology following, appreciate recommendations  · Possible conversion of PCN to nephroureteral stents in the outpatient setting    History of DVT (deep vein thrombosis)  Assessment & Plan  Diagnosed with DVT in 2019, per patient, failed PO treatment with recurrence of dvt and was placed on Lovenox BID  B/l venous duplex performed during last admission did not show any DVT and patient was advised to discuss the Lovenox with her gyn-onc  Plan  given MONROE, will place patient on heparin gtt  Continue monitoring  Constipation  Assessment & Plan  Patient reports constipation at baseline for which she takes MiraLAX and senna  We will order bowel regimen    Anemia  Assessment & Plan  Suspect this is likely anemia of chronic disease given recent iron panel         Endometrial cancer [facility])  Assessment & Plan  Diagnosed in 2017, status postchemotherapy and radiation with resultant ureteric strictures  Completed ENDOBARR (Bevacizumab, Atezolizumab and Rucaparib) clinical trial in 2022, now in remission and under active surveilance  Follows with [facility] gyn-onc  Major depression, chronic  Assessment & Plan  Continue PTA aripiprazole 20 mg daily and venlafaxine 75 mg 3 times daily  VTE Pharmacologic Prophylaxis: VTE Score: 6 High Risk (Score >/= 5) - Pharmacological DVT Prophylaxis Ordered: heparin drip  Sequential Compression Devices Ordered  Patient Centered Rounds: I performed bedside rounds with nursing staff today  Discussions with Specialists or Other Care Team Provider: none    Education and Discussions with Family / Patient: Patient declined call to   Current Length of Stay: 2 day(s)  Current Patient Status: Inpatient   Discharge Plan: Anticipate discharge in 24-48 hrs to home  Code Status: Level 1 - Full Code    Subjective:   No acute events overnight  Patient denies physical complaints today other than some discomfort at the nephrostomy sites  No fever, chills, nausea, vomiting, chest pain, shortness breath, abdominal pain  Objective:     Vitals:   Temp (24hrs), Av 7 °F (37 1 °C), Min:97 2 °F (36 2 °C), Max:100 4 °F (38 °C)    Temp:  [97 2 °F (36 2 °C)-100 4 °F (38 °C)] 99 7 °F (37 6 °C)  HR:  [] 88  Resp:  [16-20] 16  BP: ()/(58-72) 103/60  SpO2:  [69 %-100 %] 98 %  There is no height or weight on file to calculate BMI  Input and Output Summary (last 24 hours): Intake/Output Summary (Last 24 hours) at 2023 1018  Last data filed at 2023 0912  Gross per 24 hour   Intake 1803 75 ml   Output 2485 ml   Net -681 25 ml       Physical Exam:   Physical Exam  Vitals and nursing note reviewed  Constitutional:       General: She is not in acute distress  Appearance: She is well-developed  HENT:      Head: Normocephalic and atraumatic     Eyes:      Conjunctiva/sclera: Conjunctivae normal    Cardiovascular:      Rate and Rhythm: Normal rate and regular rhythm  Heart sounds: No murmur heard  Pulmonary:      Effort: Pulmonary effort is normal  No respiratory distress  Breath sounds: Normal breath sounds  Abdominal:      Palpations: Abdomen is soft  Tenderness: There is no abdominal tenderness  Genitourinary:     Comments:   Bilateral nephrostomy tubes in place with clear yellow urine output  Musculoskeletal:         General: No swelling  Cervical back: Neck supple  Skin:     General: Skin is warm and dry  Capillary Refill: Capillary refill takes less than 2 seconds  Neurological:      General: No focal deficit present  Mental Status: She is alert and oriented to person, place, and time  Psychiatric:         Mood and Affect: Mood normal           Additional Data:     Labs:  Results from last 7 days   Lab Units 05/27/23  0213   EOS PCT % 2   HEMATOCRIT % 24 2*   HEMOGLOBIN g/dL 7 2*   LYMPHS PCT % 8*   MONOS PCT % 9   NEUTROS PCT % 80*   PLATELETS Thousands/uL 254   WBC Thousand/uL 9 03     Results from last 7 days   Lab Units 05/28/23  0912 05/27/23  0213   ANION GAP mmol/L 12 11   ALBUMIN g/dL  --  3 0*   ALK PHOS U/L  --  93   ALT U/L  --  5*   AST U/L  --  7*   BUN mg/dL 65* 75*   CALCIUM mg/dL 8 0* 8 2*   CHLORIDE mmol/L 103 109*   CO2 mmol/L 15* 16*   CREATININE mg/dL 3 00* 3 32*   GLUCOSE RANDOM mg/dL 103 83   POTASSIUM mmol/L 4 4 4 1   SODIUM mmol/L 130* 136   TOTAL BILIRUBIN mg/dL  --  0 22     Results from last 7 days   Lab Units 05/26/23  1820   INR  1 09                   Lines/Drains:  Invasive Devices     Central Venous Catheter Line  Duration           Port A Cath 11/12/19 Left Chest 1292 days          Peripheral Intravenous Line  Duration           Peripheral IV 05/26/23 Left Antecubital 1 day          Drain  Duration           Ureteral Internal Stent Right ureter 6 Fr  59 days    Ureteral Internal Stent Left ureter 7 Fr   18 days    Ureteral Internal Stent Right ureter 7 Fr  18 days    Nephrostomy Left 10 2 Fr  1 day    Nephrostomy Right 10 2 Fr  1 day                Central Line:  Goal for removal: N/A - Chronic PICC             Imaging: No pertinent imaging reviewed  Recent Cultures (last 7 days):   Results from last 7 days   Lab Units 05/23/23  1011   URINE CULTURE  >100,000 cfu/ml       Last 24 Hours Medication List:   Current Facility-Administered Medications   Medication Dose Route Frequency Provider Last Rate   • acetaminophen  650 mg Oral Q6H PRN Edgard Lozano MD     • ARIPiprazole  20 mg Oral Daily Edgard Lozano MD     • aspirin  81 mg Oral QPM Edgard Lozano MD     • heparin (porcine)  3-20 Units/kg/hr (Order-Specific) Intravenous Titrated Nevin Crouch MD 16 Units/kg/hr (05/28/23 1014)   • heparin (porcine)  1,500 Units Intravenous Q6H PRN Edgard Lozano MD     • heparin (porcine)  3,000 Units Intravenous Q6H PRN Edgard Lozano MD     • multivitamin stress formula  1 tablet Oral Daily Edgard Lozano MD     • oxybutynin  15 mg Oral HS Edgard Lozano MD     • pantoprazole  40 mg Oral Early Morning Edgard Lozano MD     • polyethylene glycol  17 g Oral Daily Edgard Lozano MD     • senna-docusate sodium  1 tablet Oral BID Edgard Lozano MD     • sodium bicarbonate  650 mg Oral TID Edgard Lozano MD     • sodium chloride  75 mL/hr Intravenous Continuous Nevin Crouch MD 75 mL/hr (05/28/23 4125)   • venlafaxine  75 mg Oral TID Edgard Lozano MD          Today, Patient Was Seen By: Venancio Gibson MD    **Please Note: This note may have been constructed using a voice recognition system  **

## 2023-05-29 LAB
ANION GAP SERPL CALCULATED.3IONS-SCNC: 12 MMOL/L (ref 4–13)
APTT PPP: 104 SECONDS (ref 23–37)
APTT PPP: 59 SECONDS (ref 23–37)
APTT PPP: 70 SECONDS (ref 23–37)
BUN SERPL-MCNC: 60 MG/DL (ref 5–25)
CALCIUM SERPL-MCNC: 7.9 MG/DL (ref 8.4–10.2)
CHLORIDE SERPL-SCNC: 106 MMOL/L (ref 96–108)
CO2 SERPL-SCNC: 15 MMOL/L (ref 21–32)
CREAT SERPL-MCNC: 2.73 MG/DL (ref 0.6–1.3)
GFR SERPL CREATININE-BSD FRML MDRD: 17 ML/MIN/1.73SQ M
GLUCOSE SERPL-MCNC: 129 MG/DL (ref 65–140)
GLUCOSE SERPL-MCNC: 74 MG/DL (ref 65–140)
GLUCOSE SERPL-MCNC: 93 MG/DL (ref 65–140)
POTASSIUM SERPL-SCNC: 4.1 MMOL/L (ref 3.5–5.3)
SODIUM SERPL-SCNC: 133 MMOL/L (ref 135–147)

## 2023-05-29 RX ADMIN — SODIUM BICARBONATE 650 MG: 650 TABLET ORAL at 16:35

## 2023-05-29 RX ADMIN — SODIUM BICARBONATE: 84 INJECTION, SOLUTION INTRAVENOUS at 12:44

## 2023-05-29 RX ADMIN — ARIPIPRAZOLE 20 MG: 5 TABLET ORAL at 09:51

## 2023-05-29 RX ADMIN — VENLAFAXINE 75 MG: 37.5 TABLET ORAL at 21:36

## 2023-05-29 RX ADMIN — ASPIRIN 81 MG: 81 TABLET, COATED ORAL at 17:32

## 2023-05-29 RX ADMIN — VENLAFAXINE 75 MG: 37.5 TABLET ORAL at 16:35

## 2023-05-29 RX ADMIN — SODIUM BICARBONATE 650 MG: 650 TABLET ORAL at 09:51

## 2023-05-29 RX ADMIN — OXYBUTYNIN CHLORIDE 15 MG: 5 TABLET, EXTENDED RELEASE ORAL at 21:36

## 2023-05-29 RX ADMIN — PANTOPRAZOLE SODIUM 40 MG: 40 TABLET, DELAYED RELEASE ORAL at 05:34

## 2023-05-29 RX ADMIN — B-COMPLEX W/ C & FOLIC ACID TAB 1 TABLET: TAB at 09:51

## 2023-05-29 RX ADMIN — SENNOSIDES AND DOCUSATE SODIUM 1 TABLET: 50; 8.6 TABLET ORAL at 09:51

## 2023-05-29 RX ADMIN — VENLAFAXINE 75 MG: 37.5 TABLET ORAL at 09:51

## 2023-05-29 RX ADMIN — SODIUM BICARBONATE 650 MG: 650 TABLET ORAL at 21:36

## 2023-05-29 RX ADMIN — HEPARIN SODIUM 1500 UNITS: 1000 INJECTION INTRAVENOUS; SUBCUTANEOUS at 21:43

## 2023-05-29 RX ADMIN — SODIUM CHLORIDE 75 ML/HR: 4.5 INJECTION, SOLUTION INTRAVENOUS at 07:11

## 2023-05-29 NOTE — ASSESSMENT & PLAN NOTE
Suspect this is likely anemia of chronic disease given recent iron panel  5/30 - Hgb 6 9, likely inpart due to IVF on to of anemia of chronic disease       Plan  -Consented for blood on 5/30, giving 1 uPRBC

## 2023-05-29 NOTE — PLAN OF CARE
Problem: PAIN - ADULT  Goal: Verbalizes/displays adequate comfort level or baseline comfort level  Description: Interventions:  - Encourage patient to monitor pain and request assistance  - Assess pain using appropriate pain scale  - Administer analgesics based on type and severity of pain and evaluate response  - Implement non-pharmacological measures as appropriate and evaluate response  - Consider cultural and social influences on pain and pain management  - Notify physician/advanced practitioner if interventions unsuccessful or patient reports new pain  Outcome: Progressing     Problem: INFECTION - ADULT  Goal: Absence or prevention of progression during hospitalization  Description: INTERVENTIONS:  - Assess and monitor for signs and symptoms of infection  - Monitor lab/diagnostic results  - Monitor all insertion sites, i e  indwelling lines, tubes, and drains  - Monitor endotracheal if appropriate and nasal secretions for changes in amount and color  - [place] appropriate cooling/warming therapies per order  - Administer medications as ordered  - Instruct and encourage patient and family to use good hand hygiene technique  - Identify and instruct in appropriate isolation precautions for identified infection/condition  Outcome: Progressing     Problem: SAFETY ADULT  Goal: Patient will remain free of falls  Description: INTERVENTIONS:  - Educate patient/family on patient safety including physical limitations  - Instruct patient to call for assistance with activity   - Consult OT/PT to assist with strengthening/mobility   - Keep Call bell within reach  - Keep bed low and locked with side rails adjusted as appropriate  - Keep care items and personal belongings within reach  - Initiate and maintain comfort rounds  - Make Fall Risk Sign visible to staff  - Offer Toileting every 2 Hours, in advance of need  - Initiate/Maintain bed/chair alarm  - Apply yellow socks and bracelet for high fall risk patients  - Consider moving patient to room near nurses station  Outcome: Progressing     Problem: SAFETY ADULT  Goal: Maintain or return to baseline ADL function  Description: INTERVENTIONS:  -  Assess patient's ability to carry out ADLs; assess patient's baseline for ADL function and identify physical deficits which impact ability to perform ADLs (bathing, care of mouth/teeth, toileting, grooming, dressing, etc )  - Assess/evaluate cause of self-care deficits   - Assess range of motion  - Assess patient's mobility; develop plan if impaired  - Assess patient's need for assistive devices and provide as appropriate  - Encourage maximum independence but intervene and supervise when necessary  - Involve family in performance of ADLs  - Assess for home care needs following discharge   - Consider OT consult to assist with ADL evaluation and planning for discharge  - Provide patient education as appropriate  Outcome: Progressing

## 2023-05-29 NOTE — PLAN OF CARE
Problem: PAIN - ADULT  Goal: Verbalizes/displays adequate comfort level or baseline comfort level  Description: Interventions:  - Encourage patient to monitor pain and request assistance  - Assess pain using appropriate pain scale  - Administer analgesics based on type and severity of pain and evaluate response  - Implement non-pharmacological measures as appropriate and evaluate response  - Consider cultural and social influences on pain and pain management  - Notify physician/advanced practitioner if interventions unsuccessful or patient reports new pain  Outcome: Progressing     Problem: INFECTION - ADULT  Goal: Absence or prevention of progression during hospitalization  Description: INTERVENTIONS:  - Assess and monitor for signs and symptoms of infection  - Monitor lab/diagnostic results  - Monitor all insertion sites, i e  indwelling lines, tubes, and drains  - Monitor endotracheal if appropriate and nasal secretions for changes in amount and color  - [place] appropriate cooling/warming therapies per order  - Administer medications as ordered  - Instruct and encourage patient and family to use good hand hygiene technique  - Identify and instruct in appropriate isolation precautions for identified infection/condition  Outcome: Progressing  Goal: Absence of fever/infection during neutropenic period  Description: INTERVENTIONS:  - Monitor WBC    Outcome: Progressing     Problem: SAFETY ADULT  Goal: Patient will remain free of falls  Description: INTERVENTIONS:  - Educate patient/family on patient safety including physical limitations  - Instruct patient to call for assistance with activity   - Consult OT/PT to assist with strengthening/mobility   - Keep Call bell within reach  - Keep bed low and locked with side rails adjusted as appropriate  - Keep care items and personal belongings within reach  - Initiate and maintain comfort rounds  - Make Fall Risk Sign visible to staff  - Offer Toileting every 2 Hours, in advance of need  - Initiate/Maintain bed/chair alarm  - Apply yellow socks and bracelet for high fall risk patients  - Consider moving patient to room near nurses station  Outcome: Progressing  Goal: Maintain or return to baseline ADL function  Description: INTERVENTIONS:  -  Assess patient's ability to carry out ADLs; assess patient's baseline for ADL function and identify physical deficits which impact ability to perform ADLs (bathing, care of mouth/teeth, toileting, grooming, dressing, etc )  - Assess/evaluate cause of self-care deficits   - Assess range of motion  - Assess patient's mobility; develop plan if impaired  - Assess patient's need for assistive devices and provide as appropriate  - Encourage maximum independence but intervene and supervise when necessary  - Involve family in performance of ADLs  - Assess for home care needs following discharge   - Consider OT consult to assist with ADL evaluation and planning for discharge  - Provide patient education as appropriate  Outcome: Progressing  Goal: Maintains/Returns to pre admission functional level  Description: INTERVENTIONS:  - Perform BMAT or MOVE assessment daily    - Set and communicate daily mobility goal to care team and patient/family/caregiver     - Collaborate with rehabilitation services on mobility goals if consulted  - Stand patient 6 times a day  - Ambulate patient 4 times a day  - Out of bed to chair 3 times a day   - Out of bed for meals 3 times a day  - Out of bed for toileting  - Record patient progress and toleration of activity level   Outcome: Progressing     Problem: DISCHARGE PLANNING  Goal: Discharge to home or other facility with appropriate resources  Description: INTERVENTIONS:  - Identify barriers to discharge w/patient and caregiver  - Arrange for needed discharge resources and transportation as appropriate  - Identify discharge learning needs (meds, wound care, etc )  - Arrange for interpretive services to assist at discharge as needed  - Refer to Case Management Department for coordinating discharge planning if the patient needs post-hospital services based on physician/advanced practitioner order or complex needs related to functional status, cognitive ability, or social support system  Outcome: Progressing     Problem: Knowledge Deficit  Goal: Patient/family/caregiver demonstrates understanding of disease process, treatment plan, medications, and discharge instructions  Description: Complete learning assessment and assess knowledge base    Interventions:  - Provide teaching at level of understanding  - Provide teaching via preferred learning methods  Outcome: Progressing     Problem: Prexisting or High Potential for Compromised Skin Integrity  Goal: Skin integrity is maintained or improved  Description: INTERVENTIONS:  - Identify patients at risk for skin breakdown  - Assess and monitor skin integrity  - Assess and monitor nutrition and hydration status  - Monitor labs   - Assess for incontinence   - Turn and reposition patient  - Assist with mobility/ambulation  - Relieve pressure over bony prominences  - Avoid friction and shearing  - Provide appropriate hygiene as needed including keeping skin clean and dry  - Evaluate need for skin moisturizer/barrier cream  - Collaborate with interdisciplinary team   - Patient/family teaching  - Consider wound care consult   Outcome: Progressing     Problem: MOBILITY - ADULT  Goal: Maintain or return to baseline ADL function  Description: INTERVENTIONS:  -  Assess patient's ability to carry out ADLs; assess patient's baseline for ADL function and identify physical deficits which impact ability to perform ADLs (bathing, care of mouth/teeth, toileting, grooming, dressing, etc )  - Assess/evaluate cause of self-care deficits   - Assess range of motion  - Assess patient's mobility; develop plan if impaired  - Assess patient's need for assistive devices and provide as appropriate  - Encourage maximum independence but intervene and supervise when necessary  - Involve family in performance of ADLs  - Assess for home care needs following discharge   - Consider OT consult to assist with ADL evaluation and planning for discharge  - Provide patient education as appropriate  Outcome: Progressing  Goal: Maintains/Returns to pre admission functional level  Description: INTERVENTIONS:  - Perform BMAT or MOVE assessment daily    - Set and communicate daily mobility goal to care team and patient/family/caregiver     - Collaborate with rehabilitation services on mobility goals if consulted  - Stand patient 6 times a day  - Ambulate patient 4 times a day  - Out of bed to chair 3 times a day   - Out of bed for meals 3 times a day  - Out of bed for toileting  - Record patient progress and toleration of activity level   Outcome: Progressing

## 2023-05-29 NOTE — PROGRESS NOTES
[facility]  Progress Note  Name: Brittanie Daniels  MRN: [id number]  Unit/Bed#: W -01 I Date of Admission: 5/26/2023   Date of Service: 5/29/2023 I Hospital Day: 3    Assessment/Plan   * Acute on chronic kidney disease  Assessment & Plan  Recent Labs     05/27/23  0213 05/28/23  0912 05/29/23  0714   CREATININE 3 32* 3 00* 2 73*   EGFR 13 15 17     · Previous baseline creatinine 1 1  · Recently admitted for MONROE in the setting of obstructed ureteral stent, status post stent replacement  · Cr on discharge on 5/13/2022 was 2 47   · Cr POA of 3 17   · Was sent in to the ED for imaging and bilateral nephrostomy tube placement with IR     CT of the abdomen and pelvis demonstrated moderate right and mild to moderate left hydronephrosis with ureteral stents in place  We would like patient's renal function to be optimized prior to discharge as she is needing to use Lovenox for DVTs secondary to cancer history    Patient is status post bilateral nephrostomy tube placed by interventional radiology this admission 5/27  Plan:  · Trend BMP daily  · Monitor for signs of infection  · Strict I's/O's -> monitor for post obstructive diuresis  · IV fluids: 75 mEq sodium bicarb and D5 with 1/2 saline at 75 mL/hr secondary to decreased bicarb  · Urology following, appreciate recommendations  · Possible conversion of PCN to nephroureteral stents in the outpatient setting    Endometrial cancer [facility])  Assessment & Plan  Diagnosed in 2017, status postchemotherapy and radiation with resultant ureteric strictures  Completed ENDOBARR (Bevacizumab, Atezolizumab and Rucaparib) clinical trial in 12/2022, now in remission and under active surveilance  Follows with [facility] gyn-onc  History of DVT (deep vein thrombosis)  Assessment & Plan  Diagnosed with DVT in 2019, per patient, failed PO treatment with recurrence of dvt and was placed on Lovenox BID     B/l venous duplex performed during last admission did not show any DVT and patient was advised to discuss the Lovenox with her gyn-onc  Plan  given MONROE, will place patient on heparin gtt  Continue monitoring  Constipation  Assessment & Plan  Continue MiraLAX and senna docusate      Anemia  Assessment & Plan  Suspect this is likely anemia of chronic disease given recent iron panel  Major depression, chronic  Assessment & Plan  Continue PTA aripiprazole 20 mg daily and venlafaxine 75 mg 3 times daily  VTE Pharmacologic Prophylaxis: VTE Score: 6 High Risk (Score >/= 5) - Pharmacological DVT Prophylaxis Ordered: heparin drip  Sequential Compression Devices Ordered  Patient Centered Rounds: I performed bedside rounds with nursing staff today  Discussions with Specialists or Other Care Team Provider:     Education and Discussions with Family / Patient: Patient declined call to   Current Length of Stay: 3 day(s)  Current Patient Status: Inpatient   Discharge Plan: Anticipate discharge in 24-48 hrs to home  Code Status: Level 1 - Full Code    Subjective:   Patient resting in bed  She offers no real complaints today  Her PCN bag surgery to urine the left with straw-colored urine in the right with more red-tinged urine  She says these are both normal for the bags  Objective:     Vitals:   Temp (24hrs), Av 8 °F (36 6 °C), Min:97 7 °F (36 5 °C), Max:97 8 °F (36 6 °C)    Temp:  [97 7 °F (36 5 °C)-97 8 °F (36 6 °C)] 97 8 °F (36 6 °C)  HR:  [] 106  Resp:  [18-20] 18  BP: ()/(52-59) 93/55  SpO2:  [97 %] 97 %  Body mass index is 24 04 kg/m²  Input and Output Summary (last 24 hours): Intake/Output Summary (Last 24 hours) at 2023 1200  Last data filed at 2023 1009  Gross per 24 hour   Intake 2716 35 ml   Output 3200 ml   Net -483 65 ml       Physical Exam:   Physical Exam  Constitutional:       General: She is not in acute distress  Appearance: She is ill-appearing     HENT: Mouth/Throat:      Mouth: Mucous membranes are moist       Pharynx: Oropharynx is clear  Cardiovascular:      Rate and Rhythm: Normal rate and regular rhythm  Heart sounds: No murmur heard  Pulmonary:      Effort: Pulmonary effort is normal  No respiratory distress  Breath sounds: Normal breath sounds  Abdominal:      General: Abdomen is flat  There is no distension  Palpations: Abdomen is soft  Genitourinary:     Comments: Left PCN with straw-colored urine  Right PCN with red-tinged urine  Musculoskeletal:      Right lower leg: No edema  Left lower leg: No edema  Skin:     General: Skin is warm and dry  Neurological:      Mental Status: She is alert and oriented to person, place, and time  Psychiatric:         Mood and Affect: Mood normal          Behavior: Behavior normal           Additional Data:     Labs:  Results from last 7 days   Lab Units 05/27/23  0213   EOS PCT % 2   HEMATOCRIT % 24 2*   HEMOGLOBIN g/dL 7 2*   LYMPHS PCT % 8*   MONOS PCT % 9   NEUTROS PCT % 80*   PLATELETS Thousands/uL 254   WBC Thousand/uL 9 03     Results from last 7 days   Lab Units 05/29/23  0714 05/28/23  0912 05/27/23  0213   ANION GAP mmol/L 12   < > 11   ALBUMIN g/dL  --   --  3 0*   ALK PHOS U/L  --   --  93   ALT U/L  --   --  5*   AST U/L  --   --  7*   BUN mg/dL 60*   < > 75*   CALCIUM mg/dL 7 9*   < > 8 2*   CHLORIDE mmol/L 106   < > 109*   CO2 mmol/L 15*   < > 16*   CREATININE mg/dL 2 73*   < > 3 32*   GLUCOSE RANDOM mg/dL 74   < > 83   POTASSIUM mmol/L 4 1   < > 4 1   SODIUM mmol/L 133*   < > 136   TOTAL BILIRUBIN mg/dL  --   --  0 22    < > = values in this interval not displayed       Results from last 7 days   Lab Units 05/26/23  1820   INR  1 09                   Lines/Drains:  Invasive Devices     Central Venous Catheter Line  Duration           Port A Cath 11/12/19 Left Chest 1293 days          Peripheral Intravenous Line  Duration           Peripheral IV 05/26/23 Left Antecubital 2 days          Drain  Duration           Ureteral Internal Stent Right ureter 6 Fr  60 days    Ureteral Internal Stent Left ureter 7 Fr  19 days    Ureteral Internal Stent Right ureter 7 Fr  19 days    Nephrostomy Left 10 2 Fr  2 days    Nephrostomy Right 10 2 Fr  2 days                Central Line:  Goal for removal: N/A - Chronic PICC             Imaging: No pertinent imaging reviewed  Recent Cultures (last 7 days):   Results from last 7 days   Lab Units 05/23/23  1011   URINE CULTURE  >100,000 cfu/ml       Last 24 Hours Medication List:   Current Facility-Administered Medications   Medication Dose Route Frequency Provider Last Rate   • acetaminophen  650 mg Oral Q6H PRN Dee Woods MD     • ARIPiprazole  20 mg Oral Daily Dee Woods MD     • aspirin  81 mg Oral QPM Dee Woods MD     • heparin (porcine)  3-20 Units/kg/hr (Order-Specific) Intravenous Titrated Ankit Tavera MD 14 Units/kg/hr (05/29/23 3908)   • heparin (porcine)  1,500 Units Intravenous Q6H PRN Dee Woods MD     • heparin (porcine)  3,000 Units Intravenous Q6H PRN Dee Woods MD     • multivitamin stress formula  1 tablet Oral Daily Dee Woods MD     • oxybutynin  15 mg Oral HS Dee Woods MD     • pantoprazole  40 mg Oral Early Morning Dee Woods MD     • polyethylene glycol  17 g Oral Daily Dee Woods MD     • senna-docusate sodium  1 tablet Oral BID Dee Woods MD     • sodium bicarbonate 75 mEq in dextrose 5 % and sodium chloride 0 45 % (D5%-0 45%NaCl) 1,000 mL infusion   Intravenous Continuous Génesis Llamas MD     • sodium bicarbonate  650 mg Oral TID Dee Woods MD     • venlafaxine  75 mg Oral TID Dee Woods MD          Today, Patient Was Seen By: Lilia Hayward DO    **Please Note: This note may have been constructed using a voice recognition system  **

## 2023-05-29 NOTE — ASSESSMENT & PLAN NOTE
Diagnosed with DVT in 2019, per patient, failed PO treatment with recurrence of dvt and was placed on Lovenox BID  B/l venous duplex performed during last admission did not show any DVT and patient was advised to discuss the Lovenox with her gyn-onc  Plan  given [place]ROE, will place patient on heparin gtt  Will transition to Lovenox after renal function improves  Continue monitoring

## 2023-05-29 NOTE — ASSESSMENT & PLAN NOTE
Recent Labs     05/28/23  0912 05/29/23  0714 05/30/23  0537   CREATININE 3 00* 2 73* 2 20*   EGFR 15 17 22     · Previous baseline creatinine 1 1  · Recently admitted for MONROE in the setting of obstructed ureteral stent, status post stent replacement  · Cr on discharge on 5/13/2022 was 2 47   · Cr POA of 3 17   · Was sent in to the ED for imaging and bilateral nephrostomy tube placement with IR     CT of the abdomen and pelvis demonstrated moderate right and mild to moderate left hydronephrosis with ureteral stents in place  We would like patient's renal function to be optimized prior to discharge as she is needing to use Lovenox for DVTs secondary to cancer history    Patient is status post bilateral nephrostomy tube placed by interventional radiology this admission 5/27  Plan:  · Trend BMP daily  · Monitor for signs of infection  · Strict I's/O's -> monitor for post obstructive diuresis  · IV fluids: Stopped bicarb containing fluids  Gave 500 mL Isolyte for hypotension    · Urology following, appreciate recommendations  · Possible conversion of PCN to nephroureteral stents in the outpatient setting

## 2023-05-30 ENCOUNTER — APPOINTMENT (OUTPATIENT)
Dept: PHYSICAL THERAPY | Facility: REHABILITATION | Age: 68
End: 2023-05-30
Payer: COMMERCIAL

## 2023-05-30 ENCOUNTER — HOME HEALTH ADMISSION (OUTPATIENT)
Dept: HOME HEALTH SERVICES | Facility: HOME HEALTHCARE | Age: 68
End: 2023-05-30
Payer: COMMERCIAL

## 2023-05-30 LAB
ABO GROUP BLD: NORMAL
ANION GAP SERPL CALCULATED.3IONS-SCNC: 8 MMOL/L (ref 4–13)
APTT PPP: 72 SECONDS (ref 23–37)
APTT PPP: 74 SECONDS (ref 23–37)
BASOPHILS # BLD AUTO: 0.03 THOUSANDS/ÂΜL (ref 0–0.1)
BASOPHILS NFR BLD AUTO: 0 % (ref 0–1)
BLD GP AB SCN SERPL QL: NEGATIVE
BUN SERPL-MCNC: 48 MG/DL (ref 5–25)
CALCIUM SERPL-MCNC: 7.9 MG/DL (ref 8.4–10.2)
CHLORIDE SERPL-SCNC: 106 MMOL/L (ref 96–108)
CO2 SERPL-SCNC: 23 MMOL/L (ref 21–32)
CREAT SERPL-MCNC: 2.2 MG/DL (ref 0.6–1.3)
EOSINOPHIL # BLD AUTO: 0.13 THOUSAND/ÂΜL (ref 0–0.61)
EOSINOPHIL NFR BLD AUTO: 2 % (ref 0–6)
ERYTHROCYTE [DISTWIDTH] IN BLOOD BY AUTOMATED COUNT: 14.7 % (ref 11.6–15.1)
GFR SERPL CREATININE-BSD FRML MDRD: 22 ML/MIN/1.73SQ M
GLUCOSE SERPL-MCNC: 108 MG/DL (ref 65–140)
GLUCOSE SERPL-MCNC: 120 MG/DL (ref 65–140)
GLUCOSE SERPL-MCNC: 120 MG/DL (ref 65–140)
GLUCOSE SERPL-MCNC: 77 MG/DL (ref 65–140)
GLUCOSE SERPL-MCNC: 93 MG/DL (ref 65–140)
HCT VFR BLD AUTO: 22.7 % (ref 34.8–46.1)
HGB BLD-MCNC: 6.9 G/DL (ref 11.5–15.4)
IMM GRANULOCYTES # BLD AUTO: 0.14 THOUSAND/UL (ref 0–0.2)
IMM GRANULOCYTES NFR BLD AUTO: 2 % (ref 0–2)
LYMPHOCYTES # BLD AUTO: 0.64 THOUSANDS/ÂΜL (ref 0.6–4.47)
LYMPHOCYTES NFR BLD AUTO: 8 % (ref 14–44)
MAGNESIUM SERPL-MCNC: 1.2 MG/DL (ref 1.9–2.7)
MCH RBC QN AUTO: 28.8 PG (ref 26.8–34.3)
MCHC RBC AUTO-ENTMCNC: 30.9 G/DL (ref 31.4–37.4)
MCV RBC AUTO: 93 FL (ref 82–98)
MONOCYTES # BLD AUTO: 0.6 THOUSAND/ÂΜL (ref 0.17–1.22)
MONOCYTES NFR BLD AUTO: 8 % (ref 4–12)
NEUTROPHILS # BLD AUTO: 6.08 THOUSANDS/ÂΜL (ref 1.85–7.62)
NEUTS SEG NFR BLD AUTO: 80 % (ref 43–75)
NRBC BLD AUTO-RTO: 0 /100 WBCS
PLATELET # BLD AUTO: 193 THOUSANDS/UL (ref 149–390)
PMV BLD AUTO: 9.7 FL (ref 8.9–12.7)
POTASSIUM SERPL-SCNC: 3.7 MMOL/L (ref 3.5–5.3)
RBC # BLD AUTO: 2.4 MILLION/UL (ref 3.81–5.12)
RH BLD: POSITIVE
SODIUM SERPL-SCNC: 137 MMOL/L (ref 135–147)
SPECIMEN EXPIRATION DATE: NORMAL
WBC # BLD AUTO: 7.62 THOUSAND/UL (ref 4.31–10.16)

## 2023-05-30 RX ORDER — ONDANSETRON 2 MG/ML
4 INJECTION INTRAMUSCULAR; INTRAVENOUS ONCE
Status: COMPLETED | OUTPATIENT
Start: 2023-05-30 | End: 2023-05-30

## 2023-05-30 RX ORDER — MICONAZOLE NITRATE 20 MG/G
CREAM TOPICAL 2 TIMES DAILY
Status: DISCONTINUED | OUTPATIENT
Start: 2023-05-30 | End: 2023-05-31 | Stop reason: HOSPADM

## 2023-05-30 RX ORDER — SODIUM CHLORIDE, SODIUM GLUCONATE, SODIUM ACETATE, POTASSIUM CHLORIDE, MAGNESIUM CHLORIDE, SODIUM PHOSPHATE, DIBASIC, AND POTASSIUM PHOSPHATE .53; .5; .37; .037; .03; .012; .00082 G/100ML; G/100ML; G/100ML; G/100ML; G/100ML; G/100ML; G/100ML
500 INJECTION, SOLUTION INTRAVENOUS ONCE
Status: COMPLETED | OUTPATIENT
Start: 2023-05-30 | End: 2023-05-30

## 2023-05-30 RX ORDER — MAGNESIUM SULFATE HEPTAHYDRATE 40 MG/ML
4 INJECTION, SOLUTION INTRAVENOUS ONCE
Status: COMPLETED | OUTPATIENT
Start: 2023-05-30 | End: 2023-05-30

## 2023-05-30 RX ADMIN — OXYBUTYNIN CHLORIDE 15 MG: 5 TABLET, EXTENDED RELEASE ORAL at 21:41

## 2023-05-30 RX ADMIN — POTASSIUM CHLORIDE 30 MEQ: 1500 TABLET, EXTENDED RELEASE ORAL at 08:30

## 2023-05-30 RX ADMIN — MAGNESIUM SULFATE HEPTAHYDRATE 4 G: 40 INJECTION, SOLUTION INTRAVENOUS at 07:37

## 2023-05-30 RX ADMIN — ONDANSETRON 4 MG: 2 INJECTION INTRAMUSCULAR; INTRAVENOUS at 07:35

## 2023-05-30 RX ADMIN — VENLAFAXINE 75 MG: 37.5 TABLET ORAL at 16:24

## 2023-05-30 RX ADMIN — SODIUM BICARBONATE 650 MG: 650 TABLET ORAL at 21:41

## 2023-05-30 RX ADMIN — ARIPIPRAZOLE 20 MG: 5 TABLET ORAL at 08:30

## 2023-05-30 RX ADMIN — MICONAZOLE NITRATE 1 APPLICATION.: 20 CREAM TOPICAL at 17:58

## 2023-05-30 RX ADMIN — PANTOPRAZOLE SODIUM 40 MG: 40 TABLET, DELAYED RELEASE ORAL at 05:12

## 2023-05-30 RX ADMIN — SODIUM CHLORIDE, SODIUM GLUCONATE, SODIUM ACETATE, POTASSIUM CHLORIDE, MAGNESIUM CHLORIDE, SODIUM PHOSPHATE, DIBASIC, AND POTASSIUM PHOSPHATE 500 ML: .53; .5; .37; .037; .03; .012; .00082 INJECTION, SOLUTION INTRAVENOUS at 11:54

## 2023-05-30 RX ADMIN — ASPIRIN 81 MG: 81 TABLET, COATED ORAL at 17:58

## 2023-05-30 RX ADMIN — VENLAFAXINE 75 MG: 37.5 TABLET ORAL at 21:41

## 2023-05-30 RX ADMIN — SODIUM BICARBONATE 650 MG: 650 TABLET ORAL at 16:24

## 2023-05-30 RX ADMIN — SODIUM BICARBONATE: 84 INJECTION, SOLUTION INTRAVENOUS at 03:59

## 2023-05-30 RX ADMIN — VENLAFAXINE 75 MG: 37.5 TABLET ORAL at 08:30

## 2023-05-30 RX ADMIN — HEPARIN SODIUM 16 UNITS/KG/HR: 10000 INJECTION, SOLUTION INTRAVENOUS at 02:44

## 2023-05-30 RX ADMIN — B-COMPLEX W/ C & FOLIC ACID TAB 1 TABLET: TAB at 08:30

## 2023-05-30 RX ADMIN — SODIUM BICARBONATE 650 MG: 650 TABLET ORAL at 08:30

## 2023-05-30 NOTE — DISCHARGE INSTR - OTHER ORDERS
Skin care Plan:  1-Cleanse sacro-buttocks with soap and water  Apply Allevyn foam  Adrian with T for treatment  Change every three days and PRN  2-Turn/reposition q2h or when medically stable for pressure re-distribution on skin   3-Elevate heels to offload pressure  4-Moisturize skin daily with skin nourishing cream  5-Ehob cushion in chair when out of bed  6-Hydraguard to bilateral heels BID and PRN      7-Apply LUCILA antifungal to groin/pubis/labia BID

## 2023-05-30 NOTE — PROGRESS NOTES
"Progress Note - Urology  Ragini Lovettperla 1955, 74 y o  female MRN: [id number]    Unit/Bed#: W -01 Encounter: [id number]      Assessment & Plan:  1  Bilateral hydronephrosis  - Cheyanne Kruger is a 53-year-old female history of CKD, endometrial cancer, recent hospitalization on 5/7/2023, who underwent bilateral ureteral stent placement due to bilateral hydronephrosis  Patient had worsening renal function and moderate bilateral hydronephrosis as an outpatient and was sent to ED for IR PCN placement  - s/p IR PCN placement 5/27/2023  - Cr 2 20, yesterday 2 73  Improving    - Hgb 6 9, patient to receive 1 unit PRBC today  -  No leukocytosis  - 24 hr PCN output 3525 mL  - PCN draining clear yellow urine  - Patient will require tube changes q 3 months    - Can consider IR conversion of PCN as outpatient  - Continue with nephrology follow up outpatient  - Urology will sign off  Please do not hesitate to reach out with any questions or concerns  Subjective:   HPI: Patient seen at bedside this morning eating breakfast  Denies any complaints  Review of Systems   Constitutional: Negative for chills and fever  Respiratory: Negative for cough and shortness of breath  Cardiovascular: Negative for chest pain and palpitations  Gastrointestinal: Negative for abdominal pain and vomiting  Genitourinary: Negative for dysuria and hematuria  Musculoskeletal: Negative for arthralgias and back pain  Skin: Negative for color change and rash  All other systems reviewed and are negative  Objective:  Vitals: Blood pressure 94/56, pulse 62, temperature 97 5 °F (36 4 °C), resp  rate 16, height 4' 11\" (1 499 m), weight 54 kg (119 lb), SpO2 96 %, not currently breastfeeding  ,Body mass index is 24 04 kg/m²  Physical Exam  Vitals reviewed  Constitutional:       General: She is not in acute distress  Appearance: Normal appearance  She is normal weight  She is not ill-appearing, toxic-appearing or diaphoretic   " Comments: Frail    HENT:      Head: Normocephalic and atraumatic  Right Ear: External ear normal       Left Ear: External ear normal       Nose: Nose normal       Mouth/Throat:      Mouth: Mucous membranes are moist    Eyes:      General: No scleral icterus  Conjunctiva/sclera: Conjunctivae normal    Cardiovascular:      Rate and Rhythm: Normal rate  Pulses: Normal pulses  Pulmonary:      Effort: Pulmonary effort is normal    Abdominal:      General: Abdomen is flat  There is no distension  Palpations: Abdomen is soft  Tenderness: There is no abdominal tenderness  There is no guarding  Comments: Nephrostomy draining clear yellow urine bilaterally   Musculoskeletal:         General: Normal range of motion  Cervical back: Normal range of motion  Skin:     General: Skin is warm  Findings: No rash  Neurological:      General: No focal deficit present  Mental Status: She is alert and oriented to person, place, and time  Mental status is at baseline  Psychiatric:         Mood and Affect: Mood normal          Behavior: Behavior normal          Thought Content:  Thought content normal          Judgment: Judgment normal        Labs:  Recent Labs     05/30/23  1300   WBC 7 62     Recent Labs     05/30/23  1300   HGB 6 9*     Recent Labs     05/30/23  1300   HCT 22 7*     Recent Labs     05/28/23  0912 05/29/23  0714 05/30/23  0537   CREATININE 3 00* 2 73* 2 20*       History:  Past Medical History:   Diagnosis Date   • Anxiety    • CKD (chronic kidney disease) stage 3, GFR 30-59 ml/min ([facility] [address] )    • COVID     Fall 2022   • Depression    • Endometrial cancer ([facility] [address] ) 12/2017   • GERD (gastroesophageal reflux disease)    • H/O gastric bypass    • Hard to intubate     pt denies   • History of chemotherapy     started 12/2021endometrial cancer- done 12/23/22   • History of DVT in adulthood     RLE   • Hyperglycemia     vx type 2 dm -- last assessed 4/1/14; resolved 11/7/17   • Hyperlipidemia    • Hypertension     in past- no meds at present   • Iron deficiency anemia     iron infusions weekly   • OAB (overactive bladder)    • Port-A-Cath in place    • Wears glasses      Social History     Socioeconomic History   • Marital status: Single     Spouse name: None   • Number of children: None   • Years of education: None   • Highest education level: None   Occupational History   • None   Tobacco Use   • Smoking status: Never   • Smokeless tobacco: Never   Vaping Use   • Vaping Use: Never used   Substance and Sexual Activity   • Alcohol use: Not Currently   • Drug use: No   • Sexual activity: Not Currently   Other Topics Concern   • None   Social History Narrative   • None     Social Determinants of Health     Financial Resource Strain: Not on file   Food Insecurity: No Food Insecurity (5/30/2023)    Hunger Vital Sign    • Worried About Running Out of Food in the Last Year: Never true    • Ran Out of Food in the Last Year: Never true   Transportation Needs: No Transportation Needs (5/30/2023)    PRAPARE - Transportation    • Lack of Transportation (Medical): No    • Lack of Transportation (Non-Medical):  No   Physical Activity: Not on file   Stress: Not on file   Social Connections: Not on file   Intimate Partner Violence: Not on file   Housing Stability: Low Risk  (5/30/2023)    Housing Stability Vital Sign    • Unable to Pay for Housing in the Last Year: No    • Number of Places Lived in the Last Year: 1    • Unstable Housing in the Last Year: No     Past Surgical History:   Procedure Laterality Date   • ABDOMINAL SURGERY      GASTRIC BYPASS; 2001   • BREAST BIOPSY Right 06/28/2019    core biopsy; benign   • CHOLECYSTECTOMY      at the time of gastric bypass   • COLONOSCOPY     • CT NEEDLE BIOPSY LYMPH NODE  07/08/2019   • FL GUIDED CENTRAL VENOUS ACCESS DEVICE INSERTION  11/12/2019   • FL RETROGRADE PYELOGRAM  3/30/2023   • FL RETROGRADE PYELOGRAM  5/9/2023   • GASTRIC BYPASS     • HYSTERECTOMY Bilateral 2017    total abdominal with salpingo-oophorectomy   • IR NEPHROSTOMY TO NEPHROURETERAL STENT  06/09/2022   • IR NEPHROSTOMY TO NEPHROURETERAL STENT  12/20/2022   • IR NEPHROSTOMY TUBE CHECK/CHANGE/REPOSITION/REINSERTION/UPSIZE  05/27/2022   • IR NEPHROSTOMY TUBE PLACEMENT  07/26/2019   • IR NEPHROSTOMY TUBE PLACEMENT  5/27/2023   • IR NEPHROURETERAL STENT CHECK/CHANGE/REPOSITION  04/06/2021   • IR NEPHROURETERAL STENT CHECK/CHANGE/REPOSITION  06/04/2021   • IR NEPHROURETERAL STENT CHECK/CHANGE/REPOSITION  09/03/2021   • IR NEPHROURETERAL STENT CHECK/CHANGE/REPOSITION  12/07/2021   • IR NEPHROURETERAL STENT CHECK/CHANGE/REPOSITION  03/08/2022   • IR NEPHROURETERAL STENT CHECK/CHANGE/REPOSITION  05/10/2022   • IR NEPHROURETERAL STENT CHECK/CHANGE/REPOSITION  09/19/2022   • IR PICC LINE  09/27/2019   • IR PORT PLACEMENT  07/26/2019   • IR PORT REMOVAL  09/20/2019   • OOPHORECTOMY Bilateral 2017   • DE CYSTO BLADDER W/URETERAL CATHETERIZATION Right 3/30/2023    Procedure: CYSTOSCOPY RETROGRADE PYELOGRAM WITH exchange of STENT URETERAL;  Surgeon: Jena Hyde MD;  [facility];  Service: Urology   • DE CYSTO BLADDER W/URETERAL CATHETERIZATION Bilateral 5/9/2023    Procedure: CYSTOSCOPY, BILATERAL RETROGRADE PYELOGRAM, WITH LEFT INSERTION STENT URETERAL, RIGHT URTERAL STENT EXCHANGE;  Surgeon: Mary Monroy MD;  [facility];  Service: Urology   • DE CYSTO W/INSERT URETERAL STENT Right 3/30/2023    Procedure: EXCHANGE STENT URETERAL;  Surgeon: Jena Hyde MD;  [facility];  Service: Urology   • DE INSJ TUNNELED CTR VAD W/SUBQ PORT AGE 5 YR/> Left 11/12/2019    Procedure: INSERTION VENOUS PORT ( PORT-A-CATH) IR;  Surgeon: Jayant Wei DO;  [facility];  Service: Interventional Radiology   • DE LAPS ABD PRTM&OMENTUM DX W/WO SPEC BR/WA SPX N/A 12/19/2017    Procedure: LAPAROSCOPY DIAGNOSTIC;  Surgeon: Sandra Astudillo MD;  [facility];  Service: Gynecology Oncology   • MS LAPS W/RAD HYST W/BILAT LMPHADEC RMVL TUBE/OVARY N/A 12/19/2017    Procedure: HYSTERECTOMY LAPAROSCOPIC TOTAL ([address]) W/ ROBOTICS; BILATERAL SALPINGOOPHERECTOMY; LYMPH NODE DISSECTION; lysis of adhesions;  Surgeon: Edgar Mckeon MD;  [facility];  Service: Gynecology Oncology   • TONSILLECTOMY     • US GUIDED BREAST BIOPSY RIGHT COMPLETE Right 06/28/2019     Family History   Problem Relation Age of Onset   • Hyperlipidemia Mother    • Heart disease Mother    • Ovarian cancer Mother 48   • Colon cancer Mother    • Lymphoma Father    • Breast cancer Sister 61   • No Known Problems Brother    • No Known Problems Brother    • No Known Problems Maternal Grandmother    • Bone cancer Maternal Grandfather    • Uterine cancer Paternal Grandmother    • No Known Problems Paternal Grandfather    • No Known Problems Maternal Aunt    • No Known Problems Maternal Aunt    • No Known Problems Maternal Aunt    • No Known Problems Maternal Aunt    • No Known Problems Paternal Aunt    • No Known Problems Paternal Aunt    • No Known Problems Paternal Aunt    • No Known Problems Paternal Aunt        Ghulam Mckeon  Date: 5/30/2023 Time: 2:50 PM

## 2023-05-30 NOTE — CASE MANAGEMENT
Case Management Assessment & Discharge Planning Note    Patient name Etta Gray W /W -01 MRN [id number]  : 1955 Date 2023       Current Admission Date: 2023  Current Admission Diagnosis:Acute on chronic kidney disease   Patient Active Problem List    Diagnosis Date Noted   • Chronic kidney disease    • Acute kidney injury [facility])    • Other proteinuria    • History of DVT (deep vein thrombosis) 2023   • Hemorrhagic cystitis w/ pyelonephritis 2023   • Hyperphosphatemia 2023   • Abnormal iron saturation 2023   • Iron deficiency anemia secondary to inadequate dietary iron intake 2023   • Dry mouth 2023   • Chronic kidney disease, stage 3a ([address] ) 2023   • Nephrotoxicity 10/03/2022   • Mucositis 10/03/2022   • Vaginal atrophy 2022   • Vulvar irritation 2022   • Drug-induced anemia 2022   • Burning with urination 2022   • Continuous opioid dependence ([address] ) 2022   • Depression, recurrent ([address] ) 2022   • Cachexia ([address] ) 10/06/2021   • Chronic UTI 2021   • Secondary malignant neoplasm of other specified sites ([address] ) 2021   • Closed nondisplaced fracture of right pubis with delayed healing 2021   • Exertional dyspnea 2021   • Need for follow-up by  2021   • Dental disorder 2021   • Constipation 2021   • Osteoporosis 2020   • Pathological fracture due to osteoporosis 2020   • Bilateral piriformis syndrome    • Chronic pain syndrome    • Myofascial pain syndrome    • Nephrostomy status ([address] ) 10/14/2020   • Overactive bladder 2020   • Acute left-sided low back pain without sciatica 2020   • Obstruction of right ureter 2020   • Hypomagnesemia 10/18/2019   • Ambulatory dysfunction 10/11/2019   • Severe protein-calorie malnutrition ([address] ) 10/03/2019   • Bilateral lower extremity edema 2019   • Generalized weakness 2019 • Acute on chronic kidney disease 09/19/2019   • Hypokalemia 09/19/2019   • Chemotherapy-induced nausea 09/18/2019   • Anemia 09/06/2019   • Chemotherapy induced neutropenia ([facility] [address] ) 08/30/2019   • Cancer related pain 07/19/2019   • Encounter for central line care 07/16/2019   • Acute deep vein thrombosis (DVT) of proximal vein of lower extremity ([facility] [address] ) 06/19/2019   • Breast cancer screening 06/19/2019   • Encounter for follow-up surveillance of endometrial cancer 03/17/2019   • Benign essential hypertension 12/19/2017   • Endometrial cancer ([facility] [address] ) 11/29/2017   • Dyslipidemia 04/01/2014   • Major depression, chronic 10/07/2013      LOS (days): 4  Geometric Mean LOS (GMLOS) (days):   Days to GMLOS:     OBJECTIVE:  PATIENT READMITTED TO HOSPITAL  Risk of Unplanned Readmission Score: 39 65         Current admission status: Inpatient       Preferred Pharmacy:   [id number] [place] - [address] [facility]  [address] [facility] [address]  Phone: [phone] Fax: [phone]    [id number] [place] [address] [place]  [address] [address]  Phone: [phone] Fax: [phone] [address] ([place]) - [place], [address] [facility] [address]  [address] [facility] [address]  Phone: [phone] Fax: [phone] [facility], [address] [place] [address]  Phone: [phone] Fax: [phone]    [facility] - [facility]  [address] [place] [address] [address]  Phone: [phone] Fax: [phone] - [place] [address] [facility] [address] [facility] [address] [facility]  Phone: [phone] Fax: [phone]    [facility] - [place] [address] [place] [address] Rd  [address] [address]  Phone: [phone] Fax: [phone]    [facility] Provider: Sydnie Rogers DO    Primary Insurance: [facility] REP  Secondary Insurance:     ASSESSMENT:  [address], [facility] Representative - Sister   Primary Phone: [phone] (Mobile)  Home Phone: [phone]                         Readmission Root Cause  30 Day Readmission: Yes  Who directed you to return to the hospital?: Self  Did you understand whom to contact if you had questions or problems?: Yes  Did you get your prescriptions before you left the hospital?: Yes  Were you able to get your prescriptions filled when you left the hospital?: Yes  Did you take your medications as prescribed?: Yes  Were you able to get to your follow-up appointments?: Yes  During previous admission, was a post-acute recommendation made?: No  Patient was readmitted due to: Acute on chronic kidney disease- bilateral hydronephrosis presents with worsening of renal functions and hydronephrosis  Action Plan: IR for bilateral nephrostomy tubes places    Patient Information  Admitted from[de-identified] Home  Mental Status: Alert  Assessment information provided by[de-identified] Patient  Primary Caregiver: Self  Support Systems: Self, Family members  [place] of Residence: [address] [place] [address] do you live in?: [facility] entry access options   Select all that apply : Stairs  Number of steps to enter home : 3  Do the steps have railings?: Yes  Type of Current Residence: [address] [place] [address] home  Upon entering residence, is there a bedroom on the main floor (no further steps)?: Yes  Upon entering residence, is there a bathroom on the main floor (no further steps)?: Yes  In the last 12 months, was there a time when you were not able to pay the mortgage or rent on time?: No  In the last 12 months, how many places have you lived?: 1  Homeless/housing insecurity resource given?: N/A  Living Arrangements: Lives w/ Parent(s)  Is patient a ?: No    Activities of Daily Living Prior to Admission  Functional Status: Independent  Completes ADLs independently?: Yes  Ambulates independently?: Yes  Does patient use assisted devices?: No  Does patient currently own DME?: No  Does patient have a history of Outpatient Therapy (PT/OT)?: Yes  Does the patient have a history of Short-Term Rehab?: No  Does patient have a history of HHC?: No  Does patient currently have Kia Mancia?: No         Patient Information Continued  Income Source: Pension/correction  Does patient have prescription coverage?: Yes  Within the past 12 months, you worried that your food would run out before you got the money to buy more : Never true  Within the past 12 months, the food you bought just didn't last and you didn't have money to get more : Never true  Food insecurity resource given?: N/A  Does patient receive dialysis treatments?: No  Does patient have a history of substance abuse?: No  Does patient have a history of Mental Health Diagnosis?: No         Means of Transportation  [facility]] Family transport  In the past 12 months, has lack of transportation kept you from medical appointments or from getting medications?: No  In the past 12 months, has lack of transportation kept you from meetings, work, or from getting things needed for daily living?: No  Was application for public transport provided?: N/A        DISCHARGE DETAILS:    Discharge planning discussed with[de-identified] patient  Freedom of Choice: Yes  Comments - Freedom of Choice: VNA discussed with pt due to pt discharging home with bilateral nephrostomy tubes  Pt voiced interest in [address] [place] [address]  CM contacted family/caregiver?: Yes  Were Treatment Team discharge recommendations reviewed with patient/caregiver?: Yes  Did patient/caregiver verbalize understanding of patient care needs?: Yes  Were patient/caregiver advised of the risks associated with not following Treatment Team discharge recommendations?: Yes    Contacts  Patient Contacts: Patient  Contact Method:  In Person  Reason/Outcome: Discharge [address]         Is the patient interested in [facility] at discharge?: Yes  [address] requested[de-identified] [address] Name[de-identified] [address] [facility] Provider[de-identified] PCP  Home Health Services Needed[de-identified] Post-Op Care and Assessment, Other (comment) (Bilateral nephrostomy tube)  Homebound Criteria Met[de-identified] Requires the Assistance of Another Person for Safe Ambulation or to Leave the Home  Supporting Clincal Findings[de-identified] Limited Endurance    DME Referral Provided  Referral made for DME?: No    Other Referral/Resources/Interventions Provided:  Referral Comments: Met with pt who provided information for initial assessment and discuss discharge planning needs  Pt lives in a [facility] with her mother  Currently sleeping on the couch on the 1st floor  Has a stairlift to seconf floor of home  Discussed VNA as pt will be discharging home with bilateral nephrostomy tubes  Pt voiced interest with [facility] but only for SN  Referral placed, pt was previously referred last admission but agency did not see pt as pt did not meet homebound criteria at that time  Pt was explained homebound criteria, and pt is not planning to drive or go to outpt therapy, requiring a little assistance, and if pt has a medical follow-up her sister will be taking her  This information provided to [facility]  agency accepted  Reserved in [address]  Family to transport pt at discharge  Will continue to follow for discharge planning needs           Treatment Team Recommendation: Home with [address]  Discharge Destination Plan[de-identified] Home with [address]

## 2023-05-30 NOTE — PLAN OF CARE
Problem: PAIN - ADULT  Goal: Verbalizes/displays adequate comfort level or baseline comfort level  Description: Interventions:  - Encourage patient to monitor pain and request assistance  - Assess pain using appropriate pain scale  - Administer analgesics based on type and severity of pain and evaluate response  - Implement non-pharmacological measures as appropriate and evaluate response  - Consider cultural and social influences on pain and pain management  - Notify physician/advanced practitioner if interventions unsuccessful or patient reports new pain  Outcome: Progressing     Problem: INFECTION - ADULT  Goal: Absence or prevention of progression during hospitalization  Description: INTERVENTIONS:  - Assess and monitor for signs and symptoms of infection  - Monitor lab/diagnostic results  - Monitor all insertion sites, i e  indwelling lines, tubes, and drains  - Monitor endotracheal if appropriate and nasal secretions for changes in amount and color  - [place] appropriate cooling/warming therapies per order  - Administer medications as ordered  - Instruct and encourage patient and family to use good hand hygiene technique  - Identify and instruct in appropriate isolation precautions for identified infection/condition  Outcome: Progressing  Goal: Absence of fever/infection during neutropenic period  Description: INTERVENTIONS:  - Monitor WBC    Outcome: Progressing     Problem: SAFETY ADULT  Goal: Patient will remain free of falls  Description: INTERVENTIONS:  - Educate patient/family on patient safety including physical limitations  - Instruct patient to call for assistance with activity   - Consult OT/PT to assist with strengthening/mobility   - Keep Call bell within reach  - Keep bed low and locked with side rails adjusted as appropriate  - Keep care items and personal belongings within reach  - Initiate and maintain comfort rounds  - Make Fall Risk Sign visible to staff  - Offer Toileting every 2 Hours, in advance of need  - Initiate/Maintain bed/chair alarm  - Apply yellow socks and bracelet for high fall risk patients  - Consider moving patient to room near nurses station  Outcome: Progressing  Goal: Maintain or return to baseline ADL function  Description: INTERVENTIONS:  -  Assess patient's ability to carry out ADLs; assess patient's baseline for ADL function and identify physical deficits which impact ability to perform ADLs (bathing, care of mouth/teeth, toileting, grooming, dressing, etc )  - Assess/evaluate cause of self-care deficits   - Assess range of motion  - Assess patient's mobility; develop plan if impaired  - Assess patient's need for assistive devices and provide as appropriate  - Encourage maximum independence but intervene and supervise when necessary  - Involve family in performance of ADLs  - Assess for home care needs following discharge   - Consider OT consult to assist with ADL evaluation and planning for discharge  - Provide patient education as appropriate  Outcome: Progressing  Goal: Maintains/Returns to pre admission functional level  Description: INTERVENTIONS:  - Perform BMAT or MOVE assessment daily    - Set and communicate daily mobility goal to care team and patient/family/caregiver     - Collaborate with rehabilitation services on mobility goals if consulted  - Stand patient 6 times a day  - Ambulate patient 4 times a day  - Out of bed to chair 3 times a day   - Out of bed for meals 3 times a day  - Out of bed for toileting  - Record patient progress and toleration of activity level   Outcome: Progressing     Problem: DISCHARGE PLANNING  Goal: Discharge to home or other facility with appropriate resources  Description: INTERVENTIONS:  - Identify barriers to discharge w/patient and caregiver  - Arrange for needed discharge resources and transportation as appropriate  - Identify discharge learning needs (meds, wound care, etc )  - Arrange for interpretive services to assist at discharge as needed  - Refer to Case Management Department for coordinating discharge planning if the patient needs post-hospital services based on physician/advanced practitioner order or complex needs related to functional status, cognitive ability, or social support system  Outcome: Progressing     Problem: Knowledge Deficit  Goal: Patient/family/caregiver demonstrates understanding of disease process, treatment plan, medications, and discharge instructions  Description: Complete learning assessment and assess knowledge base    Interventions:  - Provide teaching at level of understanding  - Provide teaching via preferred learning methods  Outcome: Progressing     Problem: Prexisting or High Potential for Compromised Skin Integrity  Goal: Skin integrity is maintained or improved  Description: INTERVENTIONS:  - Identify patients at risk for skin breakdown  - Assess and monitor skin integrity  - Assess and monitor nutrition and hydration status  - Monitor labs   - Assess for incontinence   - Turn and reposition patient  - Assist with mobility/ambulation  - Relieve pressure over bony prominences  - Avoid friction and shearing  - Provide appropriate hygiene as needed including keeping skin clean and dry  - Evaluate need for skin moisturizer/barrier cream  - Collaborate with interdisciplinary team   - Patient/family teaching  - Consider wound care consult   Outcome: Progressing     Problem: MOBILITY - ADULT  Goal: Maintain or return to baseline ADL function  Description: INTERVENTIONS:  -  Assess patient's ability to carry out ADLs; assess patient's baseline for ADL function and identify physical deficits which impact ability to perform ADLs (bathing, care of mouth/teeth, toileting, grooming, dressing, etc )  - Assess/evaluate cause of self-care deficits   - Assess range of motion  - Assess patient's mobility; develop plan if impaired  - Assess patient's need for assistive devices and provide as appropriate  - Encourage maximum independence but intervene and supervise when necessary  - Involve family in performance of ADLs  - Assess for home care needs following discharge   - Consider OT consult to assist with ADL evaluation and planning for discharge  - Provide patient education as appropriate  Outcome: Progressing  Goal: Maintains/Returns to pre admission functional level  Description: INTERVENTIONS:  - Perform BMAT or MOVE assessment daily    - Set and communicate daily mobility goal to care team and patient/family/caregiver     - Collaborate with rehabilitation services on mobility goals if consulted  - Stand patient 6 times a day  - Ambulate patient 4 times a day  - Out of bed to chair 3 times a day   - Out of bed for meals 3 times a day  - Out of bed for toileting  - Record patient progress and toleration of activity level   Outcome: Progressing

## 2023-05-30 NOTE — PROGRESS NOTES
[facility]  Progress Note  Name: Mickeal Fabry  MRN: [id number]  Unit/Bed#: W -01 I Date of Admission: 5/26/2023   Date of Service: 5/30/2023 I Hospital Day: 4    Assessment/Plan   * Acute on chronic kidney disease  Assessment & Plan  Recent Labs     05/28/23  0912 05/29/23  0714 05/30/23  0537   CREATININE 3 00* 2 73* 2 20*   EGFR 15 17 22     · Previous baseline creatinine 1 1  · Recently admitted for MONROE in the setting of obstructed ureteral stent, status post stent replacement  · Cr on discharge on 5/13/2022 was 2 47   · Cr POA of 3 17   · Was sent in to the ED for imaging and bilateral nephrostomy tube placement with IR     CT of the abdomen and pelvis demonstrated moderate right and mild to moderate left hydronephrosis with ureteral stents in place  We would like patient's renal function to be optimized prior to discharge as she is needing to use Lovenox for DVTs secondary to cancer history    Patient is status post bilateral nephrostomy tube placed by interventional radiology this admission 5/27  Plan:  · Trend BMP daily  · Monitor for signs of infection  · Strict I's/O's -> monitor for post obstructive diuresis  · IV fluids: Stopped bicarb containing fluids  Gave 500 mL Isolyte for hypotension  · Urology following, appreciate recommendations  · Possible conversion of PCN to nephroureteral stents in the outpatient setting    Anemia  Assessment & Plan  Suspect this is likely anemia of chronic disease given recent iron panel  5/30 - Hgb 6 9, likely inpart due to IVF on to of anemia of chronic disease  Plan  -Consented for blood on 5/30, giving 1 uPRBC      History of DVT (deep vein thrombosis)  Assessment & Plan  Diagnosed with DVT in 2019, per patient, failed PO treatment with recurrence of dvt and was placed on Lovenox BID     B/l venous duplex performed during last admission did not show any DVT and patient was advised to discuss the Lovenox with her gyn-onc  Plan  given MONROE, will place patient on heparin gtt  Will transition to Lovenox after renal function improves  Continue monitoring  Endometrial cancer [facility])  Assessment & Plan  Diagnosed in , status postchemotherapy and radiation with resultant ureteric strictures  Completed ENDOBARR (Bevacizumab, Atezolizumab and Rucaparib) clinical trial in 2022, now in remission and under active surveilance  Follows with [facility] gyn-onc  Constipation  Assessment & Plan  Continue MiraLAX and senna docusate  -reported BM on   Major depression, chronic  Assessment & Plan  Continue PTA aripiprazole 20 mg daily and venlafaxine 75 mg 3 times daily  VTE Pharmacologic Prophylaxis: VTE Score: 6 High Risk (Score >/= 5) - Pharmacological DVT Prophylaxis Ordered: heparin drip  Sequential Compression Devices Ordered  Patient Centered Rounds: I performed bedside rounds with nursing staff today  Discussions with Specialists or Other Care Team Provider: CM    Education and Discussions with Family / Patient: Updated  (sister) via phone  Current Length of Stay: 4 day(s)  Current Patient Status: Inpatient   Discharge Plan: Anticipate discharge in 24-48 hrs to home  Code Status: Level 1 - Full Code     Subjective: The patient is feeling comfortable this morning, though she did have an episode of dizziness later in the morning, which improved with a bolus of IV fluids  She stated she had a bowel movement, and did not have fever or chills overnight  She denies shortness of breath, abdominal pain, or diarrhea  Objective:     Vitals:   Temp (24hrs), Av 9 °F (36 6 °C), Min:97 2 °F (36 2 °C), Max:98 9 °F (37 2 °C)    Temp:  [97 2 °F (36 2 °C)-98 9 °F (37 2 °C)] 97 5 °F (36 4 °C)  HR:  [62] 62  Resp:  [15-18] 16  BP: (85-96)/(46-57) 86/52  SpO2:  [96 %-97 %] 96 %  Body mass index is 24 04 kg/m²  Input and Output Summary (last 24 hours):      Intake/Output Summary (Last 24 hours) at 5/30/2023 1407  Last data filed at 5/30/2023 1314  Gross per 24 hour   Intake 4332 65 ml   Output 4000 ml   Net 332 65 ml       Physical Exam:   Physical Exam  Vitals and nursing note reviewed  Constitutional:       General: She is not in acute distress  Appearance: She is well-developed  HENT:      Head: Normocephalic and atraumatic  Nose: Nose normal       Mouth/Throat:      Mouth: Mucous membranes are moist    Eyes:      General: No scleral icterus  Conjunctiva/sclera: Conjunctivae normal       Pupils: Pupils are equal, round, and reactive to light  Cardiovascular:      Rate and Rhythm: Normal rate and regular rhythm  Heart sounds: No murmur heard  Pulmonary:      Effort: Pulmonary effort is normal  No respiratory distress  Breath sounds: Normal breath sounds  Abdominal:      Palpations: Abdomen is soft  Tenderness: There is no abdominal tenderness  Comments: Bilateral nephrostomy tubes present, straw colored drainage, slight pink hue  Musculoskeletal:         General: No swelling  Cervical back: Neck supple  Right lower leg: No edema  Left lower leg: No edema  Skin:     General: Skin is warm and dry  Capillary Refill: Capillary refill takes less than 2 seconds  Coloration: Skin is pale  Neurological:      Mental Status: She is alert     Psychiatric:         Mood and Affect: Mood normal           Additional Data:     Labs:  Results from last 7 days   Lab Units 05/30/23  1300   EOS PCT % 2   HEMATOCRIT % 22 7*   HEMOGLOBIN g/dL 6 9*   LYMPHS PCT % 8*   MONOS PCT % 8   NEUTROS PCT % 80*   PLATELETS Thousands/uL 193   WBC Thousand/uL 7 62     Results from last 7 days   Lab Units 05/30/23  0537 05/28/23  0912 05/27/23  0213   ANION GAP mmol/L 8   < > 11   ALBUMIN g/dL  --   --  3 0*   ALK PHOS U/L  --   --  93   ALT U/L  --   --  5*   AST U/L  --   --  7*   BUN mg/dL 48*   < > 75*   CALCIUM mg/dL 7 9*   < > 8 2* CHLORIDE mmol/L 106   < > 109*   CO2 mmol/L 23   < > 16*   CREATININE mg/dL 2 20*   < > 3 32*   GLUCOSE RANDOM mg/dL 108   < > 83   POTASSIUM mmol/L 3 7   < > 4 1   SODIUM mmol/L 137   < > 136   TOTAL BILIRUBIN mg/dL  --   --  0 22    < > = values in this interval not displayed       Results from last 7 days   Lab Units 05/26/23  1820   INR  1 09     Results from last 7 days   Lab Units 05/30/23  1054 05/30/23  0735 05/29/23  2059 05/29/23  1513   POC GLUCOSE mg/dl 120 120 93 129               Lines/Drains:  Invasive Devices     Central Venous Catheter Line  Duration           Port A Cath 11/12/19 Left Chest 1295 days          Peripheral Intravenous Line  Duration           Peripheral IV 05/30/23 Left;Ventral (anterior) Forearm <1 day          Drain  Duration           Ureteral Internal Stent Right ureter 6 Fr  61 days    Ureteral Internal Stent Left ureter 7 Fr  21 days    Ureteral Internal Stent Right ureter 7 Fr  21 days    Nephrostomy Left 10 2 Fr  3 days    Nephrostomy Right 10 2 Fr  3 days                Central Line:  Goal for removal: N/A - Chronic PICC             Imaging: Reviewed radiology reports from this admission including: abdominal/pelvic CT    Recent Cultures (last 7 days):         Last 24 Hours Medication List:   Current Facility-Administered Medications   Medication Dose Route Frequency Provider Last Rate   • acetaminophen  650 mg Oral Q6H PRN Loreta Rivera MD     • ARIPiprazole  20 mg Oral Daily Loreta Rivera MD     • aspirin  81 mg Oral QPM Loreta Rivera MD     • heparin (porcine)  3-20 Units/kg/hr (Order-Specific) Intravenous Titrated Cathie Dancer, MD 16 Units/kg/hr (05/30/23 1128)   • heparin (porcine)  1,500 Units Intravenous Q6H PRN Loreta Rivera MD     • heparin (porcine)  3,000 Units Intravenous Q6H PRN Loreta Rivera MD     • multivitamin stress formula  1 tablet Oral Daily Loreta Rivera MD     • oxybutynin  15 mg Oral HS Loreta Rivera MD     • pantoprazole  40 mg Oral Early Morning Ronit French MD     • polyethylene glycol  17 g Oral Daily Ronit French MD     • senna-docusate sodium  1 tablet Oral BID Ronit French MD     • sodium bicarbonate  650 mg Oral TID Ronit French MD     • venlafaxine  75 mg Oral TID Ronit French MD          Today, Patient Was Seen By: Michaela Moser MD    **Please Note: This note may have been constructed using a voice recognition system  **

## 2023-05-30 NOTE — PLAN OF CARE
Problem: PAIN - ADULT  Goal: Verbalizes/displays adequate comfort level or baseline comfort level  Description: Interventions:  - Encourage patient to monitor pain and request assistance  - Assess pain using appropriate pain scale  - Administer analgesics based on type and severity of pain and evaluate response  - Implement non-pharmacological measures as appropriate and evaluate response  - Consider cultural and social influences on pain and pain management  - Notify physician/advanced practitioner if interventions unsuccessful or patient reports new pain  Outcome: Progressing     Problem: INFECTION - ADULT  Goal: Absence or prevention of progression during hospitalization  Description: INTERVENTIONS:  - Assess and monitor for signs and symptoms of infection  - Monitor lab/diagnostic results  - Monitor all insertion sites, i e  indwelling lines, tubes, and drains  - Monitor endotracheal if appropriate and nasal secretions for changes in amount and color  - [place] appropriate cooling/warming therapies per order  - Administer medications as ordered  - Instruct and encourage patient and family to use good hand hygiene technique  - Identify and instruct in appropriate isolation precautions for identified infection/condition  Outcome: Progressing     Problem: SAFETY ADULT  Goal: Patient will remain free of falls  Description: INTERVENTIONS:  - Educate patient/family on patient safety including physical limitations  - Instruct patient to call for assistance with activity   - Consult OT/PT to assist with strengthening/mobility   - Keep Call bell within reach  - Keep bed low and locked with side rails adjusted as appropriate  - Keep care items and personal belongings within reach  - Initiate and maintain comfort rounds  - Make Fall Risk Sign visible to staff  - Offer Toileting every 2 Hours, in advance of need  - Initiate/Maintain bed/chair alarm  - Apply yellow socks and bracelet for high fall risk patients  - Consider moving patient to room near nurses station  Outcome: Progressing     Problem: SAFETY ADULT  Goal: Maintain or return to baseline ADL function  Description: INTERVENTIONS:  -  Assess patient's ability to carry out ADLs; assess patient's baseline for ADL function and identify physical deficits which impact ability to perform ADLs (bathing, care of mouth/teeth, toileting, grooming, dressing, etc )  - Assess/evaluate cause of self-care deficits   - Assess range of motion  - Assess patient's mobility; develop plan if impaired  - Assess patient's need for assistive devices and provide as appropriate  - Encourage maximum independence but intervene and supervise when necessary  - Involve family in performance of ADLs  - Assess for home care needs following discharge   - Consider OT consult to assist with ADL evaluation and planning for discharge  - Provide patient education as appropriate  Outcome: Progressing

## 2023-05-30 NOTE — WOUND OSTOMY CARE
Consult Note - Wound   Ragini Lovetti 74 y o  female MRN: [id number]  Unit/Bed#: W -01 Encounter: [id number]        History and Present Illness:  Patient is 75 yo female [facility] with CKD  Patient has history of endometrial CA, anemia, and DVT  Patient is incontinent of bowel and bladder  Patient is min/mod assist with turning from side to side for assessment  Patient is independent with meals  Patient is thin and fragile in appearance    Assessment Findings:     1  HA DTI sacral/buttocks- area of intact purple, nonblanchable erythema, no drainage present  Patient is now off-loaded in bed on bariatric EHOB cushion, encouraged to reposition  Pubis/groin area excoriated from incontinence, LUCILA ordered  B/L heels intact and blanching  No induration, fluctuance, odor, warmth/temperature differences, redness, or purulence noted to the above noted wounds and skin areas assessed  New dressings applied per orders listed below  Patient tolerated well- no s/s of non-verbal pain or discomfort observed during the encounter  Bedside nurse aware of plan of care  See flow sheets for more detailed assessment findings  Wound care will continue to follow  Skin care Plan:  1-Cleanse sacro-buttocks with soap and water  Apply Allevyn foam  Adrian with T for treatment  Change every three days and PRN  2-Turn/reposition q2h or when medically stable for pressure re-distribution on skin   3-Elevate heels to offload pressure  4-Moisturize skin daily with skin nourishing cream  5-Ehob cushion in chair when out of bed  6-Hydraguard to bilateral heels BID and PRN  7-Apply LUCILA antifungal to groin/pubis/labia BID    Wounds:  Wound 05/29/23 Pressure Injury Buttocks (Active)   Wound Image   05/30/23 1351   Wound Description Non-blanchable erythema 05/30/23 1351   Pressure Injury Stage DTPI 05/30/23 1351   [place]-wound Assessment Clean;Dry; Intact; Erythema 05/30/23 1351   Wound Length (cm) 4 cm 05/30/23 1351   Wound Width (cm) 7 cm 05/30/23 1351   Wound Depth (cm) 0 cm 05/30/23 1351   Wound Surface Area (cm^2) 28 cm^2 05/30/23 1351   Wound Volume (cm^3) 0 cm^3 05/30/23 1351   Calculated Wound Volume (cm^3) 0 cm^3 05/30/23 1351   Tunneling 0 cm 05/30/23 1351   Tunneling in depth located at 0 05/30/23 1351   Undermining 0 05/30/23 1351   Undermining is depth extending from 0 05/30/23 1351   Wound Site Closure CHARITY 05/30/23 1351   Drainage Amount None 05/30/23 1351   Non-staged Wound Description Not applicable 11/14/41 3536   Treatments Cleansed 05/30/23 1351   Dressing Foam, Silicon (eg  Allevyn, etc) 05/30/23 1351   Wound packed? No 05/30/23 1351   Packing- # removed 0 05/30/23 1351   Packing- # inserted 0 05/30/23 1351   Dressing Changed New 05/30/23 1351   Patient Tolerance Tolerated well 05/30/23 1351   Dressing Status Clean;Dry; Intact 05/30/23 1351         Tori Nixon RN, [facility]

## 2023-05-31 ENCOUNTER — TRANSITIONAL CARE MANAGEMENT (OUTPATIENT)
Dept: FAMILY MEDICINE CLINIC | Facility: CLINIC | Age: 68
End: 2023-05-31

## 2023-05-31 VITALS
SYSTOLIC BLOOD PRESSURE: 94 MMHG | HEIGHT: 59 IN | DIASTOLIC BLOOD PRESSURE: 57 MMHG | HEART RATE: 61 BPM | TEMPERATURE: 97.7 F | RESPIRATION RATE: 16 BRPM | OXYGEN SATURATION: 96 % | BODY MASS INDEX: 23.99 KG/M2 | WEIGHT: 119 LBS

## 2023-05-31 DIAGNOSIS — N13.5 BILATERAL URETERAL OBSTRUCTION: Primary | ICD-10-CM

## 2023-05-31 LAB
ABO GROUP BLD BPU: NORMAL
ANION GAP SERPL CALCULATED.3IONS-SCNC: 7 MMOL/L (ref 4–13)
APTT PPP: 72 SECONDS (ref 23–37)
BPU ID: NORMAL
BUN SERPL-MCNC: 39 MG/DL (ref 5–25)
CALCIUM SERPL-MCNC: 8.4 MG/DL (ref 8.4–10.2)
CHLORIDE SERPL-SCNC: 106 MMOL/L (ref 96–108)
CO2 SERPL-SCNC: 26 MMOL/L (ref 21–32)
CREAT SERPL-MCNC: 2.04 MG/DL (ref 0.6–1.3)
CROSSMATCH: NORMAL
ERYTHROCYTE [DISTWIDTH] IN BLOOD BY AUTOMATED COUNT: 15 % (ref 11.6–15.1)
GFR SERPL CREATININE-BSD FRML MDRD: 25 ML/MIN/1.73SQ M
GLUCOSE SERPL-MCNC: 81 MG/DL (ref 65–140)
GLUCOSE SERPL-MCNC: 83 MG/DL (ref 65–140)
GLUCOSE SERPL-MCNC: 86 MG/DL (ref 65–140)
HCT VFR BLD AUTO: 26.8 % (ref 34.8–46.1)
HGB BLD-MCNC: 8.4 G/DL (ref 11.5–15.4)
MAGNESIUM SERPL-MCNC: 2.2 MG/DL (ref 1.9–2.7)
MCH RBC QN AUTO: 28.4 PG (ref 26.8–34.3)
MCHC RBC AUTO-ENTMCNC: 31.3 G/DL (ref 31.4–37.4)
MCV RBC AUTO: 91 FL (ref 82–98)
PLATELET # BLD AUTO: 211 THOUSANDS/UL (ref 149–390)
PMV BLD AUTO: 9.9 FL (ref 8.9–12.7)
POTASSIUM SERPL-SCNC: 4.5 MMOL/L (ref 3.5–5.3)
RBC # BLD AUTO: 2.96 MILLION/UL (ref 3.81–5.12)
SODIUM SERPL-SCNC: 139 MMOL/L (ref 135–147)
UNIT DISPENSE STATUS: NORMAL
UNIT PRODUCT CODE: NORMAL
UNIT PRODUCT VOLUME: 350 ML
UNIT RH: NORMAL
WBC # BLD AUTO: 7.6 THOUSAND/UL (ref 4.31–10.16)

## 2023-05-31 RX ORDER — ENOXAPARIN SODIUM 100 MG/ML
60 INJECTION SUBCUTANEOUS EVERY 24 HOURS
Qty: 18 ML | Refills: 0 | Status: SHIPPED | OUTPATIENT
Start: 2023-05-31

## 2023-05-31 RX ORDER — SODIUM CHLORIDE 9 MG/ML
10 INJECTION INTRAVENOUS DAILY
Qty: 600 ML | Refills: 0 | Status: SHIPPED | OUTPATIENT
Start: 2023-05-31

## 2023-05-31 RX ADMIN — VENLAFAXINE 75 MG: 37.5 TABLET ORAL at 08:10

## 2023-05-31 RX ADMIN — ARIPIPRAZOLE 20 MG: 5 TABLET ORAL at 08:10

## 2023-05-31 RX ADMIN — HEPARIN SODIUM 16 UNITS/KG/HR: 10000 INJECTION, SOLUTION INTRAVENOUS at 05:44

## 2023-05-31 RX ADMIN — MICONAZOLE NITRATE 1 APPLICATION.: 20 CREAM TOPICAL at 08:13

## 2023-05-31 RX ADMIN — B-COMPLEX W/ C & FOLIC ACID TAB 1 TABLET: TAB at 08:10

## 2023-05-31 RX ADMIN — PANTOPRAZOLE SODIUM 40 MG: 40 TABLET, DELAYED RELEASE ORAL at 05:05

## 2023-05-31 RX ADMIN — SODIUM BICARBONATE 650 MG: 650 TABLET ORAL at 08:10

## 2023-05-31 RX ADMIN — POLYETHYLENE GLYCOL 3350 17 G: 17 POWDER, FOR SOLUTION ORAL at 08:10

## 2023-05-31 RX ADMIN — HEPARIN SODIUM 16 UNITS/KG/HR: 10000 INJECTION, SOLUTION INTRAVENOUS at 07:22

## 2023-05-31 RX ADMIN — SENNOSIDES AND DOCUSATE SODIUM 1 TABLET: 50; 8.6 TABLET ORAL at 08:10

## 2023-05-31 NOTE — PLAN OF CARE
Problem: PAIN - ADULT  Goal: Verbalizes/displays adequate comfort level or baseline comfort level  Description: Interventions:  - Encourage patient to monitor pain and request assistance  - Assess pain using appropriate pain scale  - Administer analgesics based on type and severity of pain and evaluate response  - Implement non-pharmacological measures as appropriate and evaluate response  - Consider cultural and social influences on pain and pain management  - Notify physician/advanced practitioner if interventions unsuccessful or patient reports new pain  Outcome: Progressing     Problem: INFECTION - ADULT  Goal: Absence or prevention of progression during hospitalization  Description: INTERVENTIONS:  - Assess and monitor for signs and symptoms of infection  - Monitor lab/diagnostic results  - Monitor all insertion sites, i e  indwelling lines, tubes, and drains  - Monitor endotracheal if appropriate and nasal secretions for changes in amount and color  - [place] appropriate cooling/warming therapies per order  - Administer medications as ordered  - Instruct and encourage patient and family to use good hand hygiene technique  - Identify and instruct in appropriate isolation precautions for identified infection/condition  Outcome: Progressing     Problem: SAFETY ADULT  Goal: Patient will remain free of falls  Description: INTERVENTIONS:  - Educate patient/family on patient safety including physical limitations  - Instruct patient to call for assistance with activity   - Consult OT/PT to assist with strengthening/mobility   - Keep Call bell within reach  - Keep bed low and locked with side rails adjusted as appropriate  - Keep care items and personal belongings within reach  - Initiate and maintain comfort rounds  - Make Fall Risk Sign visible to staff  - Offer Toileting every 2 Hours, in advance of need  - Initiate/Maintain bed/chair alarm  - Apply yellow socks and bracelet for high fall risk patients  - Consider moving patient to room near nurses station  Outcome: Progressing

## 2023-05-31 NOTE — PLAN OF CARE
Problem: PAIN - ADULT  Goal: Verbalizes/displays adequate comfort level or baseline comfort level  Description: Interventions:  - Encourage patient to monitor pain and request assistance  - Assess pain using appropriate pain scale  - Administer analgesics based on type and severity of pain and evaluate response  - Implement non-pharmacological measures as appropriate and evaluate response  - Consider cultural and social influences on pain and pain management  - Notify physician/advanced practitioner if interventions unsuccessful or patient reports new pain  Outcome: Progressing     Problem: INFECTION - ADULT  Goal: Absence or prevention of progression during hospitalization  Description: INTERVENTIONS:  - Assess and monitor for signs and symptoms of infection  - Monitor lab/diagnostic results  - Monitor all insertion sites, i e  indwelling lines, tubes, and drains  - Monitor endotracheal if appropriate and nasal secretions for changes in amount and color  - [place] appropriate cooling/warming therapies per order  - Administer medications as ordered  - Instruct and encourage patient and family to use good hand hygiene technique  - Identify and instruct in appropriate isolation precautions for identified infection/condition  Outcome: Progressing  Goal: Absence of fever/infection during neutropenic period  Description: INTERVENTIONS:  - Monitor WBC    Outcome: Progressing     Problem: SAFETY ADULT  Goal: Patient will remain free of falls  Description: INTERVENTIONS:  - Educate patient/family on patient safety including physical limitations  - Instruct patient to call for assistance with activity   - Consult OT/PT to assist with strengthening/mobility   - Keep Call bell within reach  - Keep bed low and locked with side rails adjusted as appropriate  - Keep care items and personal belongings within reach  - Initiate and maintain comfort rounds  - Make Fall Risk Sign visible to staff  - Offer Toileting every 2 Hours, in advance of need  - Initiate/Maintain bed/chair alarm  - Apply yellow socks and bracelet for high fall risk patients  - Consider moving patient to room near nurses station  Outcome: Progressing  Goal: Maintain or return to baseline ADL function  Description: INTERVENTIONS:  -  Assess patient's ability to carry out ADLs; assess patient's baseline for ADL function and identify physical deficits which impact ability to perform ADLs (bathing, care of mouth/teeth, toileting, grooming, dressing, etc )  - Assess/evaluate cause of self-care deficits   - Assess range of motion  - Assess patient's mobility; develop plan if impaired  - Assess patient's need for assistive devices and provide as appropriate  - Encourage maximum independence but intervene and supervise when necessary  - Involve family in performance of ADLs  - Assess for home care needs following discharge   - Consider OT consult to assist with ADL evaluation and planning for discharge  - Provide patient education as appropriate  Outcome: Progressing  Goal: Maintains/Returns to pre admission functional level  Description: INTERVENTIONS:  - Perform BMAT or MOVE assessment daily    - Set and communicate daily mobility goal to care team and patient/family/caregiver     - Collaborate with rehabilitation services on mobility goals if consulted  - Stand patient 6 times a day  - Ambulate patient 4 times a day  - Out of bed to chair 3 times a day   - Out of bed for meals 3 times a day  - Out of bed for toileting  - Record patient progress and toleration of activity level   Outcome: Progressing     Problem: DISCHARGE PLANNING  Goal: Discharge to home or other facility with appropriate resources  Description: INTERVENTIONS:  - Identify barriers to discharge w/patient and caregiver  - Arrange for needed discharge resources and transportation as appropriate  - Identify discharge learning needs (meds, wound care, etc )  - Arrange for interpretive services to assist at discharge as needed  - Refer to Case Management Department for coordinating discharge planning if the patient needs post-hospital services based on physician/advanced practitioner order or complex needs related to functional status, cognitive ability, or social support system  Outcome: Progressing     Problem: Knowledge Deficit  Goal: Patient/family/caregiver demonstrates understanding of disease process, treatment plan, medications, and discharge instructions  Description: Complete learning assessment and assess knowledge base    Interventions:  - Provide teaching at level of understanding  - Provide teaching via preferred learning methods  Outcome: Progressing

## 2023-05-31 NOTE — ASSESSMENT & PLAN NOTE
Diagnosed in 2017, status postchemotherapy and radiation with resultant ureteric strictures  Completed ENDOBARR (Bevacizumab, Atezolizumab and Rucaparib) clinical trial in 12/2022, now in remission and under active surveilance  She has a port in place for anticipated future chemotherapy, currently under active surveillance  Follows with [facility] gyn-onc

## 2023-05-31 NOTE — ASSESSMENT & PLAN NOTE
Diagnosed with DVT in 2019, per patient, failed PO treatment with recurrence of dvt and was placed on Lovenox BID  B/l venous duplex performed during last admission did not show any DVT and patient was advised to discuss the Lovenox with her gyn-onc  Plan  Heparin drip stopped, patient transition to Lovenox on discharge to home, with dose adjusted to 60 mg daily from previous home 80 mg daily, as adjusted to her renal function

## 2023-05-31 NOTE — ASSESSMENT & PLAN NOTE
Recent Labs     05/29/23  0714 05/30/23  0537 05/31/23  0506   CREATININE 2 73* 2 20* 2 04*   EGFR 17 22 25     · Previous baseline creatinine 1 1  · Recently admitted for MONROE in the setting of obstructed ureteral stent, status post stent replacement  · Cr on discharge on 5/13/2022 was 2 47   · Cr POA of 3 17   · Was sent in to the ED for imaging and bilateral nephrostomy tube placement with IR     CT of the abdomen and pelvis demonstrated moderate right and mild to moderate left hydronephrosis with ureteral stents in place  We would like patient's renal function to be optimized prior to discharge as she is needing to use Lovenox for DVTs secondary to cancer history    Patient is status post bilateral nephrostomy tube placed by interventional radiology this admission 5/27      Plan:  · Monitor for signs of infection  · Urology following, appreciate recommendations  · Possible conversion of PCN to nephroureteral stents in the outpatient setting  · Creatinine continues to improve post PCN

## 2023-05-31 NOTE — DISCHARGE SUMMARY
[facility]  Discharge- [address] [facility] 1955, 74 y o  female MRN: [id number]  Unit/Bed#: W -01 Encounter: [id number]  Primary Care Provider: Daylin Romo DO   Date and time admitted to hospital: 5/26/2023  2:31 PM    * Acute on chronic kidney disease  Assessment & Plan  Recent Labs     05/29/23  0714 05/30/23  0537 05/31/23  0506   CREATININE 2 73* 2 20* 2 04*   EGFR 17 22 25     · Previous baseline creatinine 1 1  · Recently admitted for MONROE in the setting of obstructed ureteral stent, status post stent replacement  · Cr on discharge on 5/13/2022 was 2 47   · Cr POA of 3 17   · Was sent in to the ED for imaging and bilateral nephrostomy tube placement with IR     CT of the abdomen and pelvis demonstrated moderate right and mild to moderate left hydronephrosis with ureteral stents in place  We would like patient's renal function to be optimized prior to discharge as she is needing to use Lovenox for DVTs secondary to cancer history    Patient is status post bilateral nephrostomy tube placed by interventional radiology this admission 5/27  Plan:  · Monitor for signs of infection  · Urology following, appreciate recommendations  · Possible conversion of PCN to nephroureteral stents in the outpatient setting  · Creatinine continues to improve post PCN    Anemia  Assessment & Plan  Suspect this is likely anemia of chronic disease given recent iron panel  5/30 - Hgb 6 9, likely inpart due to IVF on to of anemia of chronic disease  Plan  -Consented for blood on 5/30, giving 1 uPRBC  -Hemoglobin stable on 5/31  History of DVT (deep vein thrombosis)  Assessment & Plan  Diagnosed with DVT in 2019, per patient, failed PO treatment with recurrence of dvt and was placed on Lovenox BID  B/l venous duplex performed during last admission did not show any DVT and patient was advised to discuss the Lovenox with her gyn-onc       Plan  Heparin drip stopped, patient transition to Lovenox on discharge to home, with dose adjusted to 60 mg daily from previous home 80 mg daily, as adjusted to her renal function  Endometrial cancer [facility])  Assessment & Plan  Diagnosed in 2017, status postchemotherapy and radiation with resultant ureteric strictures  Completed ENDOBARR (Bevacizumab, Atezolizumab and Rucaparib) clinical trial in 12/2022, now in remission and under active surveilance  She has a port in place for anticipated future chemotherapy, currently under active surveillance  Follows with [facility] gyn-onc  Constipation  Assessment & Plan  Continue MiraLAX and senna docusate  -reported BM on 5/29  Major depression, chronic  Assessment & Plan  Continue PTA aripiprazole 20 mg daily and venlafaxine 75 mg 3 times daily  Medical Problems     Resolved Problems  Date Reviewed: 5/29/2023   None       Discharging Resident: Shiraz Knapp MD  Discharging Attending: Mohit Ledezma MD  PCP: Mehnaz Mclaughlin DO  Admission Date:   Admission Orders (From admission, onward)     Ordered        05/26/23 [address]  Once                      Discharge Date: 05/31/23    Consultations During Hospital Stay:  · Urology, case management    Procedures Performed:   · IR bilateral nephrostomy tube placement  Significant Findings / Test Results:   IR nephrostomy tube placement    Result Date: 5/30/2023  Impression: Placement of bilateral 10 2 Syriac percutaneous nephrostomy catheters  Recommendations: - Maintain bag drainage of both catheters - Flush both tubes daily with 10 mL saline - Heparin gtt may be restarted in 6 hours - We will plan for routine exchange in 3 months - Please send outpatient IR referral for exchange for bilateral nephroureteral stent placement if requested Workstation performed: [id number]     CT abdomen pelvis wo contrast    Result Date: 5/26/2023  Impression: 1   Moderate right and mild to moderate left hydronephrosis with ureteral stent in place  Persistent hydronephrosis raises concern for malfunctioning of the catheters  2  Nonobstructing left renal calculi  3  Persistent wall thickening of the urinary bladder concerning for cystitis  The study was marked in [facility] for immediate notification  Workstation performed: [id number]   ·     Incidental Findings:   · None    Test Results Pending at Discharge (will require follow up): · None     Outpatient Tests Requested:  · None    Complications: None    Reason for Admission: Acute on chronic kidney disease    Hospital Course: Thom Gay is a 74 y o  female patient with a past medical history of endometrial carcinoma s/p chemo and radiation 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, remote history of DVT in 2019, anemia, and previously stage III CKD who originally presented to the hospital on 5/26/2023 due to elevated creatinine on outpatient labs  The patient had recent inpatient treatment for similar concerns, at which time ureteral stents were replaced  CT evaluation demonstrated bilateral hydronephrosis despite appropriate positioning of stents  interventional radiology was consulted, and the patient had bilateral nephrostomy tube placement, as previously discussed with nephrology during her previous admission  The patient had significant improvement in her creatinine, decreasing from 3 17 down to 2 04 by the time of discharge  She is expected to have follow-up in 3 months for bilateral nephrostomy tube exchange, as well as follow-up as an outpatient with urology  She was taking Lovenox at home 80 mg daily for prophylaxis given her history of DVT, this dose was adjusted to 60 mg daily based on her current renal function      The patient additionally had an episode of dizziness, with a CBC showing hemoglobin of 6 9, and received 1 unit of packed red blood cells as well as a fluid bolus for hypotension, with an expected improvement of her hemoglobin the "following morning  This was considered to be secondary to her anemia of chronic disease (as evidenced by recent iron panel) combined with dilution from receiving IV fluids while inpatient  She had no acute bleeding while in the hospital, and her nephrostomy tubes were draining straw-colored urine at that time  Please see above list of diagnoses and related plan for additional information  Condition at Discharge: good    Discharge Day Visit / Exam:   Subjective: This morning, the patient reports slight pain at the site of her nephrostomy tubes, but otherwise states she is feeling well  We discussed her plan for follow-up, and reviewed that her creatinine and hemoglobin were improved today  She denied any shortness of breath, chest pain, abdominal pain, nausea, or vomiting  Vitals: Blood Pressure: 94/57 (05/31/23 1105)  Pulse: 61 (05/31/23 1105)  Temperature: 97 7 °F (36 5 °C) (05/31/23 1105)  Temp Source: Oral (05/30/23 2101)  Respirations: 16 (05/31/23 0814)  Height: 4' 11\" (149 9 cm) (05/28/23 0957)  Weight - Scale: 54 kg (119 lb) (05/28/23 1133)  SpO2: 96 % (05/30/23 1058)  Exam:   Physical Exam  Vitals and nursing note reviewed  Constitutional:       General: She is not in acute distress  Appearance: She is well-developed  HENT:      Head: Normocephalic and atraumatic  Nose: Nose normal       Mouth/Throat:      Mouth: Mucous membranes are moist    Eyes:      General: No scleral icterus  Conjunctiva/sclera: Conjunctivae normal       Pupils: Pupils are equal, round, and reactive to light  Cardiovascular:      Rate and Rhythm: Normal rate and regular rhythm  Heart sounds: No murmur heard  Pulmonary:      Effort: Pulmonary effort is normal  No respiratory distress  Breath sounds: Normal breath sounds  Abdominal:      Palpations: Abdomen is soft  Tenderness: There is no abdominal tenderness  Comments: Bilateral nephrostomy tubes present, straw colored drainage   " Musculoskeletal:         General: No swelling  Cervical back: Neck supple  Right lower leg: No edema  Left lower leg: No edema  Skin:     General: Skin is warm and dry  Capillary Refill: Capillary refill takes less than 2 seconds  Coloration: Skin is pale  Neurological:      Mental Status: She is alert  Psychiatric:         Mood and Affect: Mood normal           Discussion with Family: Patient declined call to   Discharge instructions/Information to patient and family:   See after visit summary for information provided to patient and family  Provisions for Follow-Up Care:  See after visit summary for information related to follow-up care and any pertinent home health orders  Disposition:   Home with [facility] (Reminder: Complete face to face encounter)    Planned Readmission: None    Discharge Medications:  See after visit summary for reconciled discharge medications provided to patient and/or family        **Please Note: This note may have been constructed using a voice recognition system**

## 2023-05-31 NOTE — DISCHARGE INSTR - AVS FIRST PAGE
Dear Yordy Macias,     It was our pleasure to care for you here at [facility]  It is our hope that we were always able to exceed the expected standards for your care during your stay  You were hospitalized due to ***  You were cared for on the *** floor by Lillie Alexander MD under the service of Jayson Chacon MD with the [facility] who covers for your primary care physician (PCP), Lupillo Glynn DO, while you were hospitalized  If you have any questions or concerns related to this hospitalization, you may contact us at [phone]  For follow up as well as any medication refills, we recommend that you follow up with your primary care physician  A registered nurse will reach out to you by phone within a few days after your discharge to answer any additional questions that you may have after going home  However, at this time we provide for you here, the most important instructions / recommendations at discharge:     Notable Medication Adjustments -   Due to your reduced kidney function, we are reducing the dose of Lovenox (enoxaparin) to 60 mg daily instead of your prior 80 mg daily  Saline flushes were also added for you to , to flush the nephrostomy tubes with  You had narcan prescribed from over a year ago, though we will stop this medication due to you not currently taking opioid pain medicines  Please continue taking your other medications as you did before  Testing Required after Discharge -   Please follow up with your PCP to determine if they would want to repeat lab work, such as a BMP to check your creatinine level  ** Please contact your PCP to request testing orders for any of the testing recommended here **  Important follow up information -   Please follow up with the interventional radiologist in 3 months for nephrostomy tube exchange  I have placed a referral for this follow up     Please keep your appointments with nephrology in June and urology in July for follow up  Please follow up with your primary care provider in 1-2 weeks for follow-up after this admission  Please call ahead to see if they would want any lab testing before you are seen  Other Instructions -   If you develop bleeding from the nephrostomy tube sites, white or opaque discharge from the sites or in the ostomy bags, develop fevers or chills, or stop having output from the nephrostomy tubes, please seek medical attention  Please review this entire after visit summary as additional general instructions including medication list, appointments, activity, diet, any pertinent wound care, and other additional recommendations from your care team that may be provided for you        Sincerely,     Jasmin Valenzuela MD

## 2023-05-31 NOTE — ASSESSMENT & PLAN NOTE
Suspect this is likely anemia of chronic disease given recent iron panel  5/30 - Hgb 6 9, likely inpart due to IVF on to of anemia of chronic disease  Plan  -Consented for blood on 5/30, giving 1 uPRBC  -Hemoglobin stable on 5/31

## 2023-05-31 NOTE — CASE MANAGEMENT
Case Management Discharge Planning Note    Patient name Jessica Gray W /W -01 MRN [id number]  : 1955 Date 2023       Current Admission Date: 2023  Current Admission Diagnosis:Acute on chronic kidney disease   Patient Active Problem List    Diagnosis Date Noted   • Chronic kidney disease    • Acute kidney injury [facility])    • Other proteinuria    • History of DVT (deep vein thrombosis) 2023   • Hemorrhagic cystitis w/ pyelonephritis 2023   • Hyperphosphatemia 2023   • Abnormal iron saturation 2023   • Iron deficiency anemia secondary to inadequate dietary iron intake 2023   • Dry mouth 2023   • Chronic kidney disease, stage 3a ([address] ) 2023   • Nephrotoxicity 10/03/2022   • Mucositis 10/03/2022   • Vaginal atrophy 2022   • Vulvar irritation 2022   • Drug-induced anemia 2022   • Burning with urination 2022   • Continuous opioid dependence ([address] ) 2022   • Depression, recurrent ([address] ) 2022   • Cachexia ([address] ) 10/06/2021   • Chronic UTI 2021   • Secondary malignant neoplasm of other specified sites ([address] ) 2021   • Closed nondisplaced fracture of right pubis with delayed healing 2021   • Exertional dyspnea 2021   • Need for follow-up by  2021   • Dental disorder 2021   • Constipation 2021   • Osteoporosis 2020   • Pathological fracture due to osteoporosis 2020   • Bilateral piriformis syndrome    • Chronic pain syndrome    • Myofascial pain syndrome    • Nephrostomy status ([address] ) 10/14/2020   • Overactive bladder 2020   • Acute left-sided low back pain without sciatica 2020   • Obstruction of right ureter 2020   • Hypomagnesemia 10/18/2019   • Ambulatory dysfunction 10/11/2019   • Severe protein-calorie malnutrition ([address] ) 10/03/2019   • Bilateral lower extremity edema 2019   • Generalized weakness 2019   • Acute on chronic kidney disease 09/19/2019   • Hypokalemia 09/19/2019   • Chemotherapy-induced nausea 09/18/2019   • Anemia 09/06/2019   • Chemotherapy induced neutropenia ([facility] ) 08/30/2019   • Cancer related pain 07/19/2019   • Encounter for central line care 07/16/2019   • Acute deep vein thrombosis (DVT) of proximal vein of lower extremity ([facility] ) 06/19/2019   • Breast cancer screening 06/19/2019   • Encounter for follow-up surveillance of endometrial cancer 03/17/2019   • Benign essential hypertension 12/19/2017   • Endometrial cancer ([facility] ) 11/29/2017   • Dyslipidemia 04/01/2014   • Major depression, chronic 10/07/2013      LOS (days): 5  Geometric Mean LOS (GMLOS) (days): 3 20  Days to GMLOS:-1 7     OBJECTIVE:  Risk of Unplanned Readmission Score: 38 4         Current admission status: Inpatient   Preferred Pharmacy:   [id number] [id number] [facility]  [address] [facility] [address]  Phone: [phone] Fax: [phone]    [id number]- [address] [facility], [address] [facility] [address]  [address] [place] [address]  Phone: [phone] Fax: [phone] [address] - [place], [place] - [facility] [address]  [address] [facility] [address]  Phone: [phone] Fax: [phone] [facility], [address] [facility] [address]  Phone: [phone] Fax: [phone]    [facility] - [facility]  [address] [facility] [address]  Phone: [phone] Fax: [phone] - [address] [facility] [address] [facility]  Phone: [phone] Fax: [phone]    [facility] - [address]  [address] [facility] [address]  Phone: [phone] Fax: [phone]    Primary Care Provider: DO Sierra Anna Insurance: [facility] REP  Secondary Insurance:     DISCHARGE DETAILS:    Discharge planning discussed with[de-identified] patient at bedside  Freedom of Choice: Yes  Comments - Freedom of Choice: [facility]     Were Treatment Team discharge recommendations reviewed with patient/caregiver?: Yes  Did patient/caregiver verbalize understanding of patient care needs?: Yes  Were patient/caregiver advised of the risks associated with not following Treatment Team discharge recommendations?: Yes    Contacts  Reason/Outcome: Discharge Planning, Continuity of Care, Referral    Requested [address]         Is the patient interested in [facility] at discharge?: Yes  [address] requested[de-identified] [address][de-identified] [address] [facility] Provider[de-identified] PCP  Home Health Services Needed[de-identified] Post-Op Care and Assessment, Other (comment) (Bilateral nephrostomy tube)  Homebound Criteria Met[de-identified] Requires the Assistance of Another Person for Safe Ambulation or to Leave the Home  Supporting Clincal Findings[de-identified] Limited Endurance    Other Referral/Resources/Interventions Provided:  Interventions: [facility]  Referral Comments: Patient to discharge home today  CM met with patient at bedside to provide IMM  Patient aware Kamaljit Romero will be in touch and info is on AVS  Patient sister to provide transport home  No further CM needs  Would you like to participate in our [address] service program?  : No - Declined    Treatment Team Recommendation: Home with [address] [employer]  Discharge Destination Plan[de-identified] Home with [place] at Discharge : Family      IMM Given (Date):: 05/31/23  IMM Given to[de-identified] Patient (copy provided)  IMM reviewed with patient, patient agrees with discharge determination

## 2023-06-01 ENCOUNTER — HOME CARE VISIT (OUTPATIENT)
Dept: HOME HEALTH SERVICES | Facility: HOME HEALTHCARE | Age: 68
End: 2023-06-01

## 2023-06-01 VITALS
DIASTOLIC BLOOD PRESSURE: 60 MMHG | OXYGEN SATURATION: 98 % | HEART RATE: 58 BPM | TEMPERATURE: 98.1 F | RESPIRATION RATE: 16 BRPM | SYSTOLIC BLOOD PRESSURE: 110 MMHG

## 2023-06-01 NOTE — CASE COMMUNICATION
[facility] has admitted your patient to [facility] service with the following disciplines:       and MSW  This report is informational only, no responses is needed  Primary focus of home health care  assess BL neph tube   Patient stated goals of care get stronger and remain home  Anticipated visit pattern and next visit date 6/3/23  Dorothy Bejarano 2w3 1w3   See medication list - meds in home differ from AVS no med issues noted    patient to h ave flushes eb [facility] to [facility] pharmacy for refill  has a box of flushes in home currently from hospital floor  Significant clinical findings patient hx cancer has port not access but very thin and weak  Neph tubes also too short for patient to reach indep needs extension tubing so she can indep flush  sister demonstrated flush to nurse at [facility] 69  but patient needs the extension for prolonged placement of tubes that need flushin g   Potential barriers to goal achievement weakness and on and off nausea per pt   Other pertinent information lives with mom who has dementia but sister not far willing to help but cant come daily to flush tubes  SN also called IR directly and left message with reception about patient needing extension tubing  Thank you for allowing us to participate in the care of your patient        Makeda MAGANAA

## 2023-06-01 NOTE — UTILIZATION REVIEW
NOTIFICATION OF ADMISSION DISCHARGE   This is a Notification of Discharge from [address]  Please be advised that this patient has been discharge from our facility  Below you will find the admission and discharge date and time including the patient’s disposition  UTILIZATION REVIEW CONTACT:  Dinorah Worley MA  Utilization   Network Utilization Review Department  Phone: [phone] x carefully listen to the prompts  All voicemails are confidential   Email: [email or web address]     ADMISSION INFORMATION  PRESENTATION DATE: 5/26/2023  2:31 PM  OBERVATION ADMISSION DATE:   INPATIENT ADMISSION DATE: 5/26/23  3:45 PM   DISCHARGE DATE: 5/31/2023  3:04 PM   DISPOSITION:Home with Home Health Care    IMPORTANT INFORMATION:  Send all requests for admission clinical reviews, approved or denied determinations and any other requests to dedicated fax number below belonging to the campus where the patient is receiving treatment   List of dedicated fax numbers:  [address] DENIALS (Administrative/Medical Necessity) [phone]   [address] (Maternity/NICU/Pediatrics) [phone]   [facility] [phone]   [facility] [phone]   [address] [phone]   220 [facility] [phone]   90 [facility] [phone]   1463 [facility] 119 [phone]   [facility]  [phone]   4059 [facility] [phone]   412 [facility] [address] [facility] [phone]

## 2023-06-02 ENCOUNTER — OFFICE VISIT (OUTPATIENT)
Dept: FAMILY MEDICINE CLINIC | Facility: CLINIC | Age: 68
End: 2023-06-02

## 2023-06-02 ENCOUNTER — HOME CARE VISIT (OUTPATIENT)
Dept: HOME HEALTH SERVICES | Facility: HOME HEALTHCARE | Age: 68
End: 2023-06-02

## 2023-06-02 VITALS
RESPIRATION RATE: 16 BRPM | HEART RATE: 98 BPM | WEIGHT: 115 LBS | OXYGEN SATURATION: 100 % | DIASTOLIC BLOOD PRESSURE: 50 MMHG | SYSTOLIC BLOOD PRESSURE: 90 MMHG | BODY MASS INDEX: 23.23 KG/M2

## 2023-06-02 DIAGNOSIS — Z76.89 ENCOUNTER FOR SUPPORT AND COORDINATION OF TRANSITION OF CARE: Primary | ICD-10-CM

## 2023-06-02 DIAGNOSIS — R79.89 ELEVATED SERUM CREATININE: ICD-10-CM

## 2023-06-02 DIAGNOSIS — C54.1 ENDOMETRIAL CANCER (HCC): ICD-10-CM

## 2023-06-02 DIAGNOSIS — R53.83 OTHER FATIGUE: ICD-10-CM

## 2023-06-02 DIAGNOSIS — N18.9 ACUTE KIDNEY INJURY SUPERIMPOSED ON CHRONIC KIDNEY DISEASE (HCC): ICD-10-CM

## 2023-06-02 DIAGNOSIS — N17.9 ACUTE KIDNEY INJURY SUPERIMPOSED ON CHRONIC KIDNEY DISEASE (HCC): ICD-10-CM

## 2023-06-02 NOTE — PROGRESS NOTES
Assessment & Plan     1  Encounter for support and coordination of transition of care  -     CBC and differential; Future  -     Basic metabolic panel; Future  - reviewed hospitalization course and c/s notes  - doing well s/p b/l nephrostomy tube placement with IR - good drainage from both (L>R)   - d/c'd home with Crt of 2 04 - will repeat BMP   - pt complaining of fatigue and lightheadedness - will check CBC - of note, pt received 1U PRBC in the hospital as noted to have Hb of 6 9 on admission   - has VNA coming 2x/week for the next 3wks   - of note, her Lovenox dose was renally adjusted and pt to f/u with Gyn Onc re: duration of Lovenox given h/o DVT in 2019  - f/u after labs - pt aware and agreeable    2  Acute kidney injury superimposed on chronic kidney disease (HCC)  3  Elevated serum creatinine  4  Other fatigue  5  Endometrial cancer [facility])         Subjective     Transitional Care Management Review:   Bruce Ansari is a 74 y o  female here for TCM follow up  During the TCM phone call patient stated:  TCM Call     Date and time call was made  5/31/2023  3:32 PM    Hospital care reviewed  Records reviewed    Patient was hospitialized at  [address] [place] [address]    Date of Admission  05/26/23    Date of discharge  05/31/23    Diagnosis  Acute on chronic kidney disease    Disposition  Home    Were the patients medications reviewed and updated  Yes    Current Symptoms  None      TCM Call     Post hospital issues  None    Should patient be enrolled in anticoag monitoring? No    Scheduled for follow up? Yes    Patient refusal reason  Patient following with Oncology  If biopsy turns out to not be cancerous, patient will follow with us      Patients specialists  Nephrologist    Referrals needed  None    Did you obtain your prescribed medications  Yes    Do you need help managing your prescriptions or medications  No    Is transportation to your appointment needed  No    I have advised the patient to call PCP with any new or worsening symptoms  Hui Valenzuela MA    Living Arrangements  Family members    Support System  Family    The type of support provided  Emotional; Physical    Do you have social support  Yes, as much as I need    Are you recieving any outpatient services  No    Are you recieving home care services  Yes    Types of home care services  Nurse visit    Are you using any community resources  No    Current waiver services  No    Have you fallen in the last 12 months  No    Interperter language line needed  No    Counseling  Patient    Counseling topics  Importance of RX compliance        HPI   72yo F presents to the office for TCM   - was admitted to [address] [place] [address] from 5/26-31/2023 for eval and treatment of hydronephrosis     Hospital Course: Sean Ramires is a 74 y o  female patient with a past medical history of endometrial carcinoma s/p chemo and radiation 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, remote history of DVT in 2019, anemia, and previously stage III CKD who originally presented to the hospital on 5/26/2023 due to elevated creatinine on outpatient labs  The patient had recent inpatient treatment for similar concerns, at which time ureteral stents were replaced  CT evaluation demonstrated bilateral hydronephrosis despite appropriate positioning of stents  interventional radiology was consulted, and the patient had bilateral nephrostomy tube placement, as previously discussed with nephrology during her previous admission  The patient had significant improvement in her creatinine, decreasing from 3 17 down to 2 04 by the time of discharge    She is expected to have follow-up in 3 months for bilateral nephrostomy tube exchange, as well as follow-up as an outpatient with urology      She was taking Lovenox at home 80 mg daily for prophylaxis given her history of DVT, this dose was adjusted to 60 mg daily based on her current renal function      The patient additionally had an episode of dizziness, with a CBC showing hemoglobin of 6 9, and received 1 unit of packed red blood cells as well as a fluid bolus for hypotension, with an expected improvement of her hemoglobin the following morning  This was considered to be secondary to her anemia of chronic disease (as evidenced by recent iron panel) combined with dilution from receiving IV fluids while inpatient  She had no acute bleeding while in the hospital, and her nephrostomy tubes were draining straw-colored urine at that time  - s/p b/l nephrostomy tubes as stents were not working - Crt on d/c was 2 04    - received 1 unit of PRBCs as her Hb was noted to 6 9   - noted to have low BP - (+) lightheaded and tired   - good PO intake   - has VNA who comes 2x/week x3wks       Review of Systems as per HPI    Objective     BP 90/50   Pulse 98   Resp 16   Wt 52 2 kg (115 lb)   LMP  (LMP Unknown)   SpO2 100%   BMI 23 23 kg/m²      Physical Exam  Vitals reviewed  Constitutional:       General: She is not in acute distress  Appearance: Normal appearance  She is not ill-appearing, toxic-appearing or diaphoretic  HENT:      Head: Normocephalic and atraumatic  Right Ear: External ear normal       Left Ear: External ear normal       Nose: Nose normal    Eyes:      General: No scleral icterus  Right eye: No discharge  Left eye: No discharge  Extraocular Movements: Extraocular movements intact  Conjunctiva/sclera: Conjunctivae normal    Cardiovascular:      Rate and Rhythm: Normal rate and regular rhythm  Heart sounds: Normal heart sounds  Pulmonary:      Effort: Pulmonary effort is normal  No respiratory distress  Breath sounds: Normal breath sounds  No stridor  No wheezing, rhonchi or rales  Abdominal:      Palpations: Abdomen is soft  Comments: B/l nephrostomy tubes with straw colored urine drainage (L>R)    Musculoskeletal:      Cervical back: Normal range of motion        Right lower leg: No edema  Left lower leg: No edema  Comments: Ambulating with walker   Skin:     General: Skin is warm  Neurological:      General: No focal deficit present  Mental Status: She is alert and oriented to person, place, and time     Psychiatric:         Mood and Affect: Mood normal          Behavior: Behavior normal        Medications have been reviewed by provider in current encounter    Signe Leyden, DO

## 2023-06-03 ENCOUNTER — HOME CARE VISIT (OUTPATIENT)
Dept: HOME HEALTH SERVICES | Facility: HOME HEALTHCARE | Age: 68
End: 2023-06-03

## 2023-06-03 ENCOUNTER — LAB REQUISITION (OUTPATIENT)
Dept: LAB | Facility: HOSPITAL | Age: 68
End: 2023-06-03
Payer: COMMERCIAL

## 2023-06-03 VITALS
OXYGEN SATURATION: 99 % | HEART RATE: 86 BPM | TEMPERATURE: 97.8 F | DIASTOLIC BLOOD PRESSURE: 70 MMHG | SYSTOLIC BLOOD PRESSURE: 102 MMHG | RESPIRATION RATE: 16 BRPM

## 2023-06-03 DIAGNOSIS — Z76.89 PERSONS ENCOUNTERING HEALTH SERVICES IN OTHER SPECIFIED CIRCUMSTANCES: ICD-10-CM

## 2023-06-03 LAB
ANION GAP SERPL CALCULATED.3IONS-SCNC: 8 MMOL/L (ref 4–13)
ANISOCYTOSIS BLD QL SMEAR: PRESENT
BASOPHILS # BLD MANUAL: 0 THOUSAND/UL (ref 0–0.1)
BASOPHILS NFR MAR MANUAL: 0 % (ref 0–1)
BUN SERPL-MCNC: 36 MG/DL (ref 5–25)
CALCIUM SERPL-MCNC: 8.9 MG/DL (ref 8.4–10.2)
CHLORIDE SERPL-SCNC: 107 MMOL/L (ref 96–108)
CO2 SERPL-SCNC: 25 MMOL/L (ref 21–32)
CREAT SERPL-MCNC: 1.81 MG/DL (ref 0.6–1.3)
EOSINOPHIL # BLD MANUAL: 0.63 THOUSAND/UL (ref 0–0.4)
EOSINOPHIL NFR BLD MANUAL: 5 % (ref 0–6)
ERYTHROCYTE [DISTWIDTH] IN BLOOD BY AUTOMATED COUNT: 14.6 % (ref 11.6–15.1)
GFR SERPL CREATININE-BSD FRML MDRD: 28 ML/MIN/1.73SQ M
GLUCOSE SERPL-MCNC: 85 MG/DL (ref 65–140)
HCT VFR BLD AUTO: 32.4 % (ref 34.8–46.1)
HGB BLD-MCNC: 9.9 G/DL (ref 11.5–15.4)
LYMPHOCYTES # BLD AUTO: 0.89 THOUSAND/UL (ref 0.6–4.47)
LYMPHOCYTES # BLD AUTO: 7 % (ref 14–44)
MCH RBC QN AUTO: 29.1 PG (ref 26.8–34.3)
MCHC RBC AUTO-ENTMCNC: 30.6 G/DL (ref 31.4–37.4)
MCV RBC AUTO: 95 FL (ref 82–98)
MONOCYTES # BLD AUTO: 0.89 THOUSAND/UL (ref 0–1.22)
MONOCYTES NFR BLD: 7 % (ref 4–12)
NEUTROPHILS # BLD MANUAL: 10.25 THOUSAND/UL (ref 1.85–7.62)
NEUTS SEG NFR BLD AUTO: 81 % (ref 43–75)
PLATELET # BLD AUTO: 292 THOUSANDS/UL (ref 149–390)
PLATELET BLD QL SMEAR: ADEQUATE
PMV BLD AUTO: 9.4 FL (ref 8.9–12.7)
POIKILOCYTOSIS BLD QL SMEAR: PRESENT
POTASSIUM SERPL-SCNC: 4.8 MMOL/L (ref 3.5–5.3)
RBC # BLD AUTO: 3.4 MILLION/UL (ref 3.81–5.12)
SODIUM SERPL-SCNC: 140 MMOL/L (ref 135–147)
WBC # BLD AUTO: 12.65 THOUSAND/UL (ref 4.31–10.16)

## 2023-06-03 PROCEDURE — 85027 COMPLETE CBC AUTOMATED: CPT | Performed by: FAMILY MEDICINE

## 2023-06-03 PROCEDURE — 85007 BL SMEAR W/DIFF WBC COUNT: CPT | Performed by: FAMILY MEDICINE

## 2023-06-03 PROCEDURE — 80048 BASIC METABOLIC PNL TOTAL CA: CPT | Performed by: FAMILY MEDICINE

## 2023-06-04 ENCOUNTER — HOME CARE VISIT (OUTPATIENT)
Dept: HOME HEALTH SERVICES | Facility: HOME HEALTHCARE | Age: 68
End: 2023-06-04
Payer: COMMERCIAL

## 2023-06-04 NOTE — CASE COMMUNICATION
1605  Renard Dowd Patient called into office regarding nephostomy tubes  Patient has 2 nephostomy tubes  States caregiver was flushing today as ordered however she is unable to flush the right side as the stopcock is stuck and caregiver unable to move it open to flush  Left tube was flushed without difficutly  Both Nephrostomy tubes are functioning well, putting out yellow urine  Sites free from redness, swelling and increased pain  Patient reques ting her tuesday SN visit be moved to monday for assist and teaching

## 2023-06-05 ENCOUNTER — HOME CARE VISIT (OUTPATIENT)
Dept: HOME HEALTH SERVICES | Facility: HOME HEALTHCARE | Age: 68
End: 2023-06-05
Payer: COMMERCIAL

## 2023-06-05 VITALS
TEMPERATURE: 97.9 F | DIASTOLIC BLOOD PRESSURE: 62 MMHG | RESPIRATION RATE: 18 BRPM | HEART RATE: 82 BPM | SYSTOLIC BLOOD PRESSURE: 98 MMHG

## 2023-06-05 PROCEDURE — G0299 HHS/HOSPICE OF RN EA 15 MIN: HCPCS

## 2023-06-05 NOTE — PROGRESS NOTES
Outpatient Oncology Nutrition Consult  Type of Consult: Follow Up  Care Location: Office Visit  - met w/pt & sister Tongtie Foot)   Nutrition Assessment:  Oncology Diagnosis & Treatments:   Endometrial cancer  S/p surgery 12/19/17  Recurrence 7/8/19  S/p chemotherapy (carboplatin/taxol from 7/30/19-1/6/20)  S/p whole pelvis RT (2/4/20- 4/13/20)  Disease progression  S/p ENDOBAR trial 12/21/20-12/5/22    Oncology History   Endometrial cancer ([facility] [address] )   11/17/2017 Initial Diagnosis    Endometrial cancer ([facility] [address] )     11/17/2017 Biopsy    ENDOMETRIAL BIOPSY: WELL DIFFERENTIATED ENDOMETRIAL ADENOCARCINOMA (FIGO I) WITH FOCALMUCINOUS FEATURES  Part B: ENDOCERVICAL POLYP:BENIGN ENDOCERVICAL      12/19/2017 Surgery    Robotic assisted total laparoscopic hysterectomy with bilateral salpingo-oophorectomy and sentinel bilateral pelvic lymph node dissection  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 (4 4 x 3 2 cm tumor, 9 4/15 4mm invasion, NO LVSI, washings revealed atypical cellular changes)     12/19/2017 Genetic Testing    Morrison testing negative     6/28/2019 Biopsy    A  Breast, Right, US BX Right Breast 1000 4 cmfn:  - Benign breast tissue with focal histiocytic aggregate  See comment   - Negative for atypia and in-situ or invasive carcinoma  7/8/2019 Recurrence    Presented with right lower extremity DVT and CT demonstrating right pelvic sidewall mass with venous, ureteral and nerve compression causing significant neuropathic pain  Biopsy:  Lymph Node, Right pelvic lymph node x3:  - High-grade adenocarcinoma  7/30/2019 - 1/6/2020 Chemotherapy    Taxol 175 mg/m2 and carboplatin AUC 6 every 21 days  Dose was reduced to taxol 135 mg/m2 and carboplatin AUC 5  Completed 6 cycles  Treatment protracted due to multiple hospital admissions       2/24/2020 - 4/13/2020 Radiation    adjuvant external beam radiation therapy to the whole pelvis to 4500 cGy followed by boost to gross disease of an additional 2200 cGy     11/23/2020 Progression    New necrotic adenopathy in the retroperitoneum on CT      12/21/2020 - 2/3/2023 Chemotherapy    Began chemotherapy with rucaparib, atezolizumab, and bevacizumab as per [facility] clinical trial     Rucaparib held cycle 28 secondary to fatigue  Avastin held since cycle 30 for elevated UPC  Treatment held for one cycle after cycle 31 for mucositis/fatigue         PMH: RNYGB (2001 at [address])    Past Medical History:   Diagnosis Date   • Anxiety    • CKD (chronic kidney disease) stage 3, GFR 30-59 ml/min ([facility])    • COVID     Fall 2022   • Depression    • Endometrial cancer ([facility] [address] ) 12/2017   • GERD (gastroesophageal reflux disease)    • H/O gastric bypass    • Hard to intubate     pt denies   • History of chemotherapy     started 12/2021endometrial cancer- done 12/23/22   • History of DVT in adulthood     RLE   • Hyperglycemia     vx type 2 dm -- last assessed 4/1/14; resolved 11/7/17   • Hyperlipidemia    • Hypertension     in past- no meds at present   • Iron deficiency anemia     iron infusions weekly   • OAB (overactive bladder)    • Port-A-Cath in place    • Wears glasses      Past Surgical History:   Procedure Laterality Date   • ABDOMINAL SURGERY      GASTRIC BYPASS; 2001   • BREAST BIOPSY Right 06/28/2019    core biopsy; benign   • CHOLECYSTECTOMY      at the time of gastric bypass   • COLONOSCOPY     • CT NEEDLE BIOPSY LYMPH NODE  07/08/2019   • FL GUIDED CENTRAL VENOUS ACCESS DEVICE INSERTION  11/12/2019   • FL RETROGRADE PYELOGRAM  3/30/2023   • FL RETROGRADE PYELOGRAM  5/9/2023   • GASTRIC BYPASS     • HYSTERECTOMY Bilateral 2017    total abdominal with salpingo-oophorectomy   • IR NEPHROSTOMY TO NEPHROURETERAL STENT  06/09/2022   • IR NEPHROSTOMY TO NEPHROURETERAL STENT  12/20/2022   • IR NEPHROSTOMY TUBE CHECK/CHANGE/REPOSITION/REINSERTION/UPSIZE  05/27/2022   • IR NEPHROSTOMY TUBE PLACEMENT  07/26/2019   • IR NEPHROSTOMY TUBE PLACEMENT  5/27/2023   • IR NEPHROURETERAL STENT CHECK/CHANGE/REPOSITION  04/06/2021   • IR NEPHROURETERAL STENT CHECK/CHANGE/REPOSITION  06/04/2021   • IR NEPHROURETERAL STENT CHECK/CHANGE/REPOSITION  09/03/2021   • IR NEPHROURETERAL STENT CHECK/CHANGE/REPOSITION  12/07/2021   • IR NEPHROURETERAL STENT CHECK/CHANGE/REPOSITION  03/08/2022   • IR NEPHROURETERAL STENT CHECK/CHANGE/REPOSITION  05/10/2022   • IR NEPHROURETERAL STENT CHECK/CHANGE/REPOSITION  09/19/2022   • IR PICC LINE  09/27/2019   • IR PORT PLACEMENT  07/26/2019   • IR PORT REMOVAL  09/20/2019   • OOPHORECTOMY Bilateral 2017   • CA CYSTO BLADDER W/URETERAL CATHETERIZATION Right 3/30/2023    Procedure: CYSTOSCOPY RETROGRADE PYELOGRAM WITH exchange of STENT URETERAL;  Surgeon: Bill Sarmiento MD;  [facility];  Service: Urology   • CA CYSTO BLADDER W/URETERAL CATHETERIZATION Bilateral 5/9/2023    Procedure: CYSTOSCOPY, BILATERAL RETROGRADE PYELOGRAM, WITH LEFT INSERTION STENT URETERAL, RIGHT URTERAL STENT EXCHANGE;  Surgeon: Michael Oshea MD;  Location[facility];  Service: Urology   • CA CYSTO W/INSERT URETERAL STENT Right 3/30/2023    Procedure: EXCHANGE STENT URETERAL;  Surgeon: Bill Sarmiento MD;  [facility];  Service: Urology   • CA INSJ TUNNELED CTR VAD W/SUBQ PORT AGE 5 YR/> Left 11/12/2019    Procedure: INSERTION VENOUS PORT ( PORT-A-CATH) IR;  Surgeon: Aguilar Hodges DO;  [facility];  Service: Interventional Radiology   • CA LAPS ABD PRTM&OMENTUM DX W/WO SPEC BR/WA SPX N/A 12/19/2017    Procedure: LAPAROSCOPY DIAGNOSTIC;  Surgeon: Kay Witt MD;  [facility];  Service: Gynecology Oncology   • CA LAPS W/RAD HYST W/BILAT LMPHADEC RMVL TUBE/OVARY N/A 12/19/2017    Procedure: HYSTERECTOMY LAPAROSCOPIC TOTAL ([address]) W/ ROBOTICS; BILATERAL SALPINGOOPHERECTOMY; LYMPH NODE DISSECTION; lysis of adhesions;  Surgeon: Kay Witt MD;  Location[facility];  Service: Gynecology Oncology   • TONSILLECTOMY     • US GUIDED BREAST BIOPSY RIGHT COMPLETE Right 06/28/2019       Review of Medications:   Vitamins, Supplements and Herbals: Med List Reviewed & pt is only taking: Hx RNYGB regimen: Vitamin D, Folic Acid, Align, calcium citrate QD, B12 1000 mcg QD, MVI QD (MVI does not have iron);  Iron 65 mg QD (2 hrs seperate from calcium)    Current Outpatient Medications:   •  ARIPiprazole (ABILIFY) 20 MG tablet, Take 20 mg by mouth in the morning , Disp: , Rfl:   •  aspirin (ECOTRIN LOW STRENGTH) 81 mg EC tablet, Take 81 mg by mouth every evening, Disp: , Rfl:   •  Calcium-Magnesium-Vitamin D (CALCIUM 500 PO), Take 1 capsule by mouth daily, Disp: , Rfl:   •  cholecalciferol (VITAMIN D3) 1,000 units tablet, Take 4 tablets (4,000 Units total) by mouth daily, Disp: 90 tablet, Rfl: 0  •  Cranberry-Vitamin C 250-60 MG CAPS, Take 1 tablet by mouth in the morning, Disp: 90 capsule, Rfl: 3  •  Cyanocobalamin (VITAMIN B12 PO), Take by mouth daily , Disp: , Rfl:   •  dronabinol (MARINOL) 2 5 mg capsule, Take 1 capsule (2 5 mg total) by mouth 2 (two) times a day before meals (Patient taking differently: Take 2 5 mg by mouth 2 (two) times a day before meals per patient taking prn for when she needs help eating), Disp: 30 capsule, Rfl: 0  •  enoxaparin (Lovenox) 60 mg/0 6 mL, Inject 0 6 mL (60 mg total) under the skin every 24 hours, Disp: 18 mL, Rfl: 0  •  folic acid (FOLVITE) 1 mg tablet, Take 1 tablet (1 mg total) by mouth daily, Disp: , Rfl: 0  •  Gemtesa 75 MG TABS, TAKE 75 MG BY MOUTH IN THE MORNING, Disp: 90 tablet, Rfl: 2  •  Multiple Vitamin (MULTIVITAMIN) tablet, Take 1 tablet by mouth daily, Disp: , Rfl:   •  omeprazole (PriLOSEC) 20 mg delayed release capsule, Take 20 mg by mouth daily, Disp: , Rfl:   •  oxybutynin (DITROPAN XL) 15 MG 24 hr tablet, Take 1 tablet (15 mg total) by mouth daily at bedtime, Disp: 90 tablet, Rfl: 3  •  saccharomyces boulardii (FLORASTOR) 250 mg capsule, Take 1 capsule (250 mg total) by mouth 2 (two) times a day, Disp: , Rfl: "0  •  senna (SENOKOT) 8 6 MG tablet, Take 1 tablet (8 6 mg total) by mouth 2 (two) times a day as needed for constipation, Disp: 60 tablet, Rfl: 0  •  sodium chloride, PF, 0 9 %, 10 mL by Intracatheter route daily Intracatheter flushing daily  May substitute prefilled syringe with normal saline 10 mL vials, 10 mL syringes, and 18 g blunt needles, Disp: 900 mL, Rfl: 0  •  sodium chloride, PF, 0 9 %, 10 mL by Intracatheter route daily Please flush each nephrostomy tube with 10 mL of saline, once daily  , Disp: 600 mL, Rfl: 0  •  venlafaxine (EFFEXOR) 75 mg tablet, Take 75 mg by mouth 3 (three) times a day  , Disp: , Rfl:     Most Recent Lab Results:  Lab Results   Component Value Date    ALB 2 8 (L) 06/13/2023    ALT 5 (L) 05/27/2023    AST 7 (L) 05/27/2023    BUN 32 (H) 06/13/2023    BUN 36 (H) 06/03/2023    CALCIUM 9 1 06/13/2023    CHOL 183 10/27/2017    CHOLESTEROL 184 01/28/2023     (H) 06/13/2023    CREATININE 1 78 (H) 06/13/2023    CREATININE 1 81 (H) 06/03/2023    EGFR 29 06/13/2023    FERRITIN 1,036 (H) 05/22/2023    FOLATE >22 3 05/22/2023    GLUC 76 06/13/2023    GLUCOSE 219 (H) 12/19/2017    GLUF 89 05/25/2023    GLUF 86 01/28/2023    HDL 64 01/28/2023    HGBA1C 5 2 10/31/2022    HGBA1C 4 8 07/07/2021    HGBA1C 5 1 02/03/2020    IRON 61 05/22/2023    K 4 2 06/13/2023    K 4 8 06/03/2023    LDLCALC 104 (H) 01/28/2023    MG 2 2 05/31/2023     10/27/2017     05/12/2017    PHOS 4 5 (H) 06/13/2023    PHOS 5 6 (H) 05/27/2023    POCGLU 81 05/31/2023    SODIUM 141 06/13/2023    SODIUM 140 06/03/2023    TIBC 218 (L) 05/22/2023    TRIG 82 01/28/2023    WBC 8 05 06/13/2023     Anthropometric Measurements:   Height: 59\"  Ht Readings from Last 1 Encounters:   06/07/23 4' 11\" (1 499 m)     Wt Readings from Last 20 Encounters:   06/14/23 52 2 kg (115 lb)   06/07/23 51 9 kg (114 lb 6 4 oz)   06/02/23 52 2 kg (115 lb)   05/28/23 54 kg (119 lb)   05/17/23 50 3 kg (111 lb)   05/16/23 49 kg (108 lb) " 04/28/23 52 6 kg (116 lb)   03/30/23 56 2 kg (124 lb)   03/20/23 56 2 kg (124 lb)   02/20/23 57 2 kg (126 lb)   02/08/23 55 3 kg (122 lb)   02/06/23 54 4 kg (120 lb)   02/03/23 53 8 kg (118 lb 8 oz)   02/01/23 55 1 kg (121 lb 8 oz)   01/30/23 55 2 kg (121 lb 11 1 oz)   01/20/23 54 9 kg (121 lb)   01/16/23 55 3 kg (122 lb)   01/13/23 54 7 kg (120 lb 8 oz)   01/06/23 54 kg (119 lb)   12/30/22 54 2 kg (119 lb 8 oz)     Weight Hx:  Usual Weight: 270# before RNYGB in 2001; lowest post-op wt: 200#; wt typically fluctuates between 215-230# (prior to wt loss)  Varian: (2/25/20) 185 6#, (3/3/20) 188 6#, (3/10/20) 187 2#, (3/19/20) 186 2#, (3/24/20) 187#, (3/31/20) 185 4#, (4/10/20) 184 4#    Home Scale Wts: (2/19/20) 185#, (8/3/20) 194#, (3/16/23 - on infusion scale pt weighed herself) 126#, (4/13/23 - on infusion scale pt weighed self) 121#    Oncology Nutrition-Anthropometrics    Flowsheet Row Nutrition from 6/14/2023 in [facility] Nutrition from 4/17/2023 in [facility]   Patient [age] (years): [age] years   Patient (female) height (in): 61 in 61 in   Current Weight to be used for anthropometric calculations (lbs) 115 lbs 121 lbs   Current Weight to be used for anthropometric calculations (kg) 52 3 kg 55 kg   BMI: 23 2 24 4   IBW female: 95 lbs 95 lbs   IBW (kg) female: 43 2 kg 43 2 kg   IBW % (female) 121 1 % 127 4 %   Adjusted BW (female): 100 lbs 101 5 lbs   Adjusted BW kg (female): 45 5 kg 46 1 kg   % weight change after 1 week: 0 5 % --   Weight change after 1 week (lbs) 0 6 lbs --   % weight change after 1 month: 6 5 % -4 %   Weight change after 1 month (lbs) 7 lbs -5 lbs   % weight change after 3 months: -7 3 % 0 4 %   Weight change after 3 months (lbs) -9 lbs 0 5 lbs   % weight change after 6 months: -- -2 8 %   Weight change after 6 months (lbs) -- -3 5 lbs   % weight change after 1 year: -- -0 8 % "    Nutrition-Focused Physical Findings:  severe muscle depletion ([place]) - hollowing/scooping/depression and severe body fat depletion ([place]) - hollowing/depression/dark circles     Food/Nutrition-Related History & Client/Social History:    Current Nutrition Impact Symptoms:   [] Nausea  [x] Reduced Appetite  [] Acid Reflux   [] Vomiting  [x] Unintended Wt Loss - hx, improved this past month   -pt does not want to regain the wt that she lost [x] Malabsorption - S/p RNYGB in 2001   [] Diarrhea  [] Unintended Wt Gain  [] Dumping Syndrome   [] Constipation - hx  -no longer on bowel meds  -having a BM 1-2x/day but states they are \"gooey\" and \"sticky\" which makes BM's urgent, BM's are light brown and within 15 min of eating [] Thick Mucous/Secretions  [] Abdominal Pain   [] Dysgeusia (Altered Taste)   [x] Xerostomia (Dry Mouth)  [] Gas    [] Dysosmia (Altered Smell)  [] Thrush  [] Difficulty Chewing    [] Oral Mucositis (Sore Mouth)  [x] Fatigue - improving since hospitalization [] Pain:   [] Odynophagia   [] Esophagitis  [] Other:    [] Dysphagia [] Early Satiety  [] No Problems Eating      Food Allergies & Intolerances: yes: had skin patch testing which showed allergy to strawberries, but says she is able to eat strawberries without any side effects    Current Diet: Regular Diet, No Restrictions  Current Nutrition Intake:  Increased since last visit  Appetite: Good   Nutrition Route: PO   Activity level: as been more active lately, wants to start walking    24 Hr Diet Recall:  Has been having chocolate chip cookie dough ice cream after every mean lately d/t sweet cravings   Breakfast: none today, woke up late; yesterday: 1 sl ham/cheese quiche, 1 cup ice cream   Snack: none  Lunch: 1 hot dog on a pc of thin/light white bread  Snack: carrots, garden veggie dip  Dinner: 1 5 c spaghetti with tomato sauce  Snack: 1 cup ice cream    Beverages: water (16 9 oz x3-4), sweetened iced tea (32 oz x1)   -Does not drink coffee " or alcohol   -Does not separate fluids from solids, does not have discomfort while eating and drinking at the same time  Supplements:   [employer] (1kqa=082 kcal, 5 g pro) mixed with [employer]life milk - none lately     Oncology Nutrition-Estimated Needs    Flowsheet Row Nutrition from 3/28/2022 in [place] [facility] [facility] Nutrition from 10/11/2021 in [place] [address] [facility]   Weight type used Actual weight Adjusted weight   Weight in kilograms (kg) used for estimated needs 54 1 kg 49 5 kg   Energy needs formula:  35-40 kcal/kg 35-40 kcal/kg   Energy needs based on 35 kcal/k kcal 1733 kcal   Energy needs based on 40 kcal/k kcal 1980 kcal   Protein needs formula: 1 5-2 g/kg 1 5-2 g/kg   Protein needs based on 1 5 g/kg 81 g 74 g   Protein needs based on 2 g/kg 108 g 99 g   Fluid needs formula: 30-35 mL/kg 30-35 mL/kg   Fluid needs based on 30 mL/kg 1623 mL 1485 mL   Fluid needs in ounces 55 oz 50 oz   Fluid needs based on 35 mL/kg 1894 mL 1733 mL   Fluid needs in ounces 64 oz 59 oz        Discussion & Intervention:    Ragini was evaluated today for an RD follow up regarding nutrition impact sx management, weight management and cancer-preventative eating  Ragini has completed tx for endometrial cancer  She is doing well today but reports 2 recent hospitalizations that she is still recovering from  She states she now has nephrostomy tubes and is feeling much better  She is regaining wt, eating better, and her appetite is now good  Her renal labs are improving and being monitored by her Nephrologist   She is experiencing xerostomia and urgent BM's  Denies N/V/D/C  She says she has been eating ice cream after every meal lately and this may be contributing to change in BM's (?d/t lactose, fat, and/or sugar content)  We discussed substitutes for the ice cream today      Reviewed 24 hour recall, which revealed an suboptimal po intake, and discussed ways to balance energy intake to promote a healthy body weight and optimize nutrient intake to promote healing  Also reviewed the importance of wt management throughout the healing process and the role of a cancer preventative diet and post-RNYGB diet in managing wt and overall health  Based on today's assessment, discussion included: MNT for: wt management, bowel management, xerostomia, adequate hydration & fluid choices, eating smaller more frequent meals every 2-3 hours (5-6 small meals/day), having consistent and planned snacks between meals, utilizing oral nutrition supplements and balancing energy intake with physical activity  Moving forward, Ragini was encouraged to transition to a cancer-preventative lifestyle and practice MNT for nutrition impact sx management  She does best with self-directed goals and has chosen goals for herself moving forward; RD guidance provided during goal setting  She will follow up on a prn basis moving forward  Materials Provided: not applicable  All questions and concerns addressed during today’s visit  Ragini has RD contact information  Nutrition Diagnosis:  Increased Nutrient Needs (kcal & pro) related to increased demand for nutrients and disease state as evidenced by recovering from cancer tx  Altered GI Function related to alteration in GI tract motility as evidenced by urgent BM's  Monitoring & Evaluation:   Goals:  weight maintenance/stabilization  adequate nutrition impact symptom management  transition to a cancer preventative eating pattern     Progress Towards Goals: Progressing    Nutrition Rx & Recommendations:  Small, frequent meals/snacks may be easier to tolerate than 3 large daily meals  Aim for 5-6 small meals per day (every 2-3 hours)  Include protein at all meals/snacks    Follow a Cancer Preventative Nutrition Pattern: colorful, plant-based, low-fat, avoid added sugars, limit alcohol, include high fiber foods, limit processed meats, limit red meat, choose lean protein sources, use low-fat cooking methods, balance calories with physical activity, avoid excessive weight gain throughout life  Stay hydrated by sipping fluids of choice/tolerance throughout the day  For dry mouth: sip water throughout the day; try very sweet (or tart, as tolerated) drinks; chew gum or suck on hard candy, popsicles, & ice chips; eat easy to swallow foods (such as pureed cooked foods or soups); moisten food with sauce/gravy/salad dressing; do not drink beer, wine, or any type of alcohol; keep lips moist with lip balm; avoid tobacco products & second-hand smoke; try Biotene as needed (see pages 17-18 in your Eating Hints book)  Follow proper oral care; Try baking soda/salt water rinse recipe (mix 3/4 tsp salt + 1 tsp baking soda + 1 qt water; rinse with pain water after using) in Eating Hints book (pg 18)  Brush your teeth before/after meals & before bed     Choose a lean protein + fiber source at all eating occasions  Lifetime vitamin & mineral supplementation recommended after Gastric Bypass Surgery:  Multivitamin BID  Iron 45 mg daily (or per physician depending on medical history and labs)   Vitamin D daily  Calcium Citrate 500 mg TID (do not take with iron, needs to be 2 hrs apart from iron)  B12 1000 mcg daily    Patient choosen goals:  Trial a lactose-free or lactose-reduced diet as needed  Try swapping ice cream for fruit/pb or Greek yogurt to see if this helps your bowels     Follow Up Plan: PRN per patient request   Recommend Referral to Other Providers: none at this time

## 2023-06-06 NOTE — PROGRESS NOTES
NEPHROLOGY OFFICE VISIT   Jesusita Patient Al 74 y o  female MRN: [id number]  6/7/2023    Reason for Visit: Follow-up    INTERVAL HISTORY and SUBJECTIVE:    I had the pleasure of seeing Ragini today in the renal clinic  She is a 29-year-old female who presents for hospital follow-up  She was hospitalized at [facility] recently and was seen by our service for management of acute kidney injury on CKD  Since hospital discharge she is doing much better  Ragini states that she knows her kidneys are doing better since she feels so much better  She has new PCN tubes in bilaterally  She notices much more urine output from the left than the right there is sometimes some sediment depending on how much she is drinking but never blood  Her sister is with her who is assisting with her care  [facility] is visiting twice a week  Last labs on 6/3/2023 reviewed with Ragini    ASSESSMENT AND PLAN:    Acute kidney injury on CKD:  · Creatinine 4 26 on admission 5/4/2023  · Etiology: Obstructive uropathy due to hydronephrosis  · Renal function improved and was down to 2 47 at discharge  · Follow-up creatinine 1 81 mg/dL  · Function improving  PCN tubes are completely functional at this time  Suspect unilateral function with left urine output much greater than right  · At this time will monitor closely  Check renal function panel and UPCR later this month  I also provided her with a standing order for blood work until next appointment with Dr Caryn Damon to scheduled for 9/14/2023    Chronic kidney disease, stage III:  · Follows with Dr Caryn Damon  · Baseline creatinine 1 1 to 1 4 mg/dL in March 2023 creatinine was as high as 1 6      Nephrotic range proteinuria:  · UPCR near 17 g, currently near 7 g   · Questionably related to a Avastin which can cause nephrotic syndrome  · No clinical evidence of nephrotic syndrome per prior eval   · Myeloma work-up negative  · Prior work-up unrevealing  · Differential diagnosis being entertained: Affective Avastin vs underlying glomerular disease possibly related to FSGS versus minimal-change  Doubtfully related to malignancy since she is in remission  · Regarding renal biopsy  Considering concerns regarding unilateral function renal biopsy on hold  · This time plan is to control modifiable risk factors and monitor  · Check renal function panel    Bilateral hydronephrosis/history of right hydronephrosis  · Etiology endometrial carcinoma status post right nephrostomy tube which was later converted to nephroureteral stent  · Stent malfunction now with bilateral PCN tubes  · Follows with urology  · Needs follow-up renal ultrasound     Blood pressure/volume status:  · Blood pressure acceptable, not on medication  · Previously on quinapril  · Unable to use ACE or ARB due to borderline low blood pressure    Anemia  · Follow-up hemoglobin up to 9 9  Normocytic  · Status post 1 unit of packed cells during hospitalization    Electrolytes/acid-base:   · Acceptable    CKD-MBD:  · On PhosLo  · Vitamin D D insufficiency: On 4000 units of vitamin D3 daily  · Check levels before next appointment    Other:   · History of endometrial cancer status post abdominal hysterectomy and bilateral salpingo-oophorectomy, treated with chemotherapy: Taxol and carboplatin in 2019  Subsequently treated with onrucaparib, atezolizumab, and bevacizumab  · DVT on Lovenox, gastric bypass 2001    PATIENT INSTRUCTIONS:    Patient Instructions   You were seen today for hospital follow-up  Kidney function was significantly above baseline during hospitalization but improving and getting close to baseline  Plan/recommendations:  • Avoid NSAIDs such as Motrin Aleve ibuprofen and Advil  Tylenol is okay to use for mild pain  • Hold sodium bicarbonate tablets    We will be trending blood work  • Follow-up blood work in a few weeks along with checking urine protein creatinine ratio  • Active orders for blood work available every "4 weeks until blood work is done prior to the next appointment with Dr Win Peguero September  • Call with any questions or concerns        Orders Placed This Encounter   Procedures   • Renal function panel     Standing Status:   Standing     Number of Occurrences:   3     Standing Expiration Date:   6/7/2024     Order Specific Question: This patient has an active home care or hospice episode  [facility]? Answer: Yes   • Protein / creatinine ratio, urine     Standing Status:   Future     Standing Expiration Date:   8/7/2023     Order Specific Question: This patient has an active home care or hospice episode  Should this order be COMPLETED by [facility]? Answer:   Yes       OBJECTIVE:  Current Weight: Weight - Scale: 51 9 kg (114 lb 6 4 oz)  Vitals:    06/07/23 1321 06/07/23 1341   BP:  106/64   BP Location:  Left arm   Patient Position:  Sitting   Cuff Size:  Large   Pulse:  78   Weight: 51 9 kg (114 lb 6 4 oz)    Height: 4' 11\" (1 499 m)     Body mass index is 23 11 kg/m²  REVIEW OF SYSTEMS:    Review of Systems   Constitutional: Negative for chills, fatigue, fever and unexpected weight change  HENT: Negative for congestion and sore throat  Eyes: Negative for visual disturbance  Respiratory: Negative for cough and shortness of breath  Cardiovascular: Negative for chest pain, palpitations and leg swelling  Gastrointestinal: Negative for abdominal pain, constipation, diarrhea, nausea and vomiting  Genitourinary: Negative for hematuria  Musculoskeletal: Negative for arthralgias and back pain  Skin: Negative for color change and rash  Neurological: Negative for dizziness, seizures, syncope, weakness and headaches  Psychiatric/Behavioral: The patient is not nervous/anxious  All other systems reviewed and are negative  PHYSICAL EXAM:      Physical Exam  Constitutional:       General: She is not in acute distress       Appearance: She is " well-developed  She is not toxic-appearing  HENT:      Head: Normocephalic and atraumatic  Nose: Nose normal       Mouth/Throat:      Mouth: Mucous membranes are moist    Eyes:      Extraocular Movements: Extraocular movements intact  Conjunctiva/sclera: Conjunctivae normal       Pupils: Pupils are equal, round, and reactive to light  Neck:      Thyroid: No thyromegaly  Vascular: No carotid bruit or JVD  Trachea: No tracheal deviation  Cardiovascular:      Rate and Rhythm: Normal rate and regular rhythm  Heart sounds: No murmur heard  No friction rub  No gallop  Pulmonary:      Effort: Pulmonary effort is normal       Breath sounds: Normal breath sounds  Abdominal:      General: Bowel sounds are normal  There is no distension  Palpations: Abdomen is soft  There is no mass  Tenderness: There is no abdominal tenderness  There is no guarding or rebound  Genitourinary:     Comments: Bilateral external PCN tubes  No hematuria  Sediment noted in urine  Left draining more than right, significant difference in drainage amount with left greater than right  Musculoskeletal:         General: No swelling, tenderness or signs of injury  Normal range of motion  Cervical back: Normal range of motion and neck supple  No rigidity or tenderness  Right lower leg: No edema  Left lower leg: No edema  Skin:     General: Skin is warm and dry  Capillary Refill: Capillary refill takes less than 2 seconds  Coloration: Skin is not jaundiced or pale  Findings: No erythema or rash  Neurological:      General: No focal deficit present  Mental Status: She is alert and oriented to person, place, and time  Psychiatric:         Mood and Affect: Mood normal          Behavior: Behavior normal          Thought Content:  Thought content normal          Judgment: Judgment normal          Medications:    Current Outpatient Medications:   •  ARIPiprazole (ABILIFY) 20 MG tablet, Take 20 mg by mouth in the morning , Disp: , Rfl:   •  aspirin (ECOTRIN LOW STRENGTH) 81 mg EC tablet, Take 81 mg by mouth every evening, Disp: , Rfl:   •  Calcium-Magnesium-Vitamin D (CALCIUM 500 PO), Take 1 capsule by mouth daily, Disp: , Rfl:   •  cholecalciferol (VITAMIN D3) 1,000 units tablet, Take 4 tablets (4,000 Units total) by mouth daily, Disp: 90 tablet, Rfl: 0  •  Cranberry-Vitamin C 250-60 MG CAPS, Take 1 tablet by mouth in the morning, Disp: 90 capsule, Rfl: 3  •  Cyanocobalamin (VITAMIN B12 PO), Take by mouth daily , Disp: , Rfl:   •  dronabinol (MARINOL) 2 5 mg capsule, Take 1 capsule (2 5 mg total) by mouth 2 (two) times a day before meals (Patient taking differently: Take 2 5 mg by mouth 2 (two) times a day before meals per patient taking prn for when she needs help eating), Disp: 30 capsule, Rfl: 0  •  enoxaparin (Lovenox) 60 mg/0 6 mL, Inject 0 6 mL (60 mg total) under the skin every 24 hours, Disp: 18 mL, Rfl: 0  •  folic acid (FOLVITE) 1 mg tablet, Take 1 tablet (1 mg total) by mouth daily, Disp: , Rfl: 0  •  Gemtesa 75 MG TABS, TAKE 75 MG BY MOUTH IN THE MORNING, Disp: 90 tablet, Rfl: 2  •  Multiple Vitamin (MULTIVITAMIN) tablet, Take 1 tablet by mouth daily, Disp: , Rfl:   •  omeprazole (PriLOSEC) 20 mg delayed release capsule, Take 20 mg by mouth daily, Disp: , Rfl:   •  oxybutynin (DITROPAN XL) 15 MG 24 hr tablet, Take 1 tablet (15 mg total) by mouth daily at bedtime, Disp: 90 tablet, Rfl: 3  •  saccharomyces boulardii (FLORASTOR) 250 mg capsule, Take 1 capsule (250 mg total) by mouth 2 (two) times a day, Disp: , Rfl: 0  •  senna (SENOKOT) 8 6 MG tablet, Take 1 tablet (8 6 mg total) by mouth 2 (two) times a day as needed for constipation, Disp: 60 tablet, Rfl: 0  •  sodium chloride, PF, 0 9 %, 10 mL by Intracatheter route daily Intracatheter flushing daily   May substitute prefilled syringe with normal saline 10 mL vials, 10 mL syringes, and 18 g blunt needles, Disp: 900 mL, Rfl: 0  •  sodium chloride, PF, 0 9 %, 10 mL by Intracatheter route daily Please flush each nephrostomy tube with 10 mL of saline, once daily  , Disp: 600 mL, Rfl: 0  •  venlafaxine (EFFEXOR) 75 mg tablet, Take 75 mg by mouth 3 (three) times a day  , Disp: , Rfl:     Laboratory Results:  Results from last 7 days   Lab Units 06/03/23  0844   BUN mg/dL 36*   CALCIUM mg/dL 8 9   CHLORIDE mmol/L 107   CO2 mmol/L 25   CREATININE mg/dL 1 81*   HEMATOCRIT % 32 4*   HEMOGLOBIN g/dL 9 9*   POTASSIUM mmol/L 4 8   PLATELETS Thousands/uL 292   WBC Thousand/uL 12 65*       Results for orders placed or performed in visit on 03/83/20   Basic metabolic panel   Result Value Ref Range    Sodium 140 135 - 147 mmol/L    Potassium 4 8 3 5 - 5 3 mmol/L    Chloride 107 96 - 108 mmol/L    CO2 25 21 - 32 mmol/L    ANION GAP 8 4 - 13 mmol/L    BUN 36 (H) 5 - 25 mg/dL    Creatinine 1 81 (H) 0 60 - 1 30 mg/dL    Glucose 85 65 - 140 mg/dL    Calcium 8 9 8 4 - 10 2 mg/dL    eGFR 28 ml/min/1 73sq m   CBC and differential   Result Value Ref Range    WBC 12 65 (H) 4 31 - 10 16 Thousand/uL    RBC 3 40 (L) 3 81 - 5 12 Million/uL    Hemoglobin 9 9 (L) 11 5 - 15 4 g/dL    Hematocrit 32 4 (L) 34 8 - 46 1 %    MCV 95 82 - 98 fL    MCH 29 1 26 8 - 34 3 pg    MCHC 30 6 (L) 31 4 - 37 4 g/dL    RDW 14 6 11 6 - 15 1 %    MPV 9 4 8 9 - 12 7 fL    Platelets 386 746 - 525 Thousands/uL   Manual Differential(PHLEBS Do Not Order)   Result Value Ref Range    Segmented % 81 (H) 43 - 75 %    Lymphocytes % 7 (L) 14 - 44 %    Monocytes % 7 4 - 12 %    Eosinophils, % 5 0 - 6 %    Basophils % 0 0 - 1 %    Absolute Neutrophils 10 25 (H) 1 85 - 7 62 Thousand/uL    Lymphocytes Absolute 0 89 0 60 - 4 47 Thousand/uL    Monocytes Absolute 0 89 0 00 - 1 22 Thousand/uL    Eosinophils Absolute 0 63 (H) 0 00 - 0 40 Thousand/uL    Basophils Absolute 0 00 0 00 - 0 10 Thousand/uL    Total Counted      Anisocytosis Present     Poikilocytes Present     Platelet Estimate Adequate Adequate     *Note: Due to a large number of results and/or encounters for the requested time period, some results have not been displayed  A complete set of results can be found in Results Review

## 2023-06-07 ENCOUNTER — HOME CARE VISIT (OUTPATIENT)
Dept: HOME HEALTH SERVICES | Facility: HOME HEALTHCARE | Age: 68
End: 2023-06-07
Payer: COMMERCIAL

## 2023-06-07 ENCOUNTER — OFFICE VISIT (OUTPATIENT)
Dept: NEPHROLOGY | Facility: CLINIC | Age: 68
End: 2023-06-07
Payer: COMMERCIAL

## 2023-06-07 VITALS
DIASTOLIC BLOOD PRESSURE: 64 MMHG | BODY MASS INDEX: 23.06 KG/M2 | SYSTOLIC BLOOD PRESSURE: 106 MMHG | WEIGHT: 114.4 LBS | HEIGHT: 59 IN | HEART RATE: 78 BPM

## 2023-06-07 DIAGNOSIS — E83.39 HYPERPHOSPHATEMIA: ICD-10-CM

## 2023-06-07 DIAGNOSIS — N17.9 AKI (ACUTE KIDNEY INJURY) (HCC): ICD-10-CM

## 2023-06-07 DIAGNOSIS — N18.31 CHRONIC KIDNEY DISEASE, STAGE 3A (HCC): ICD-10-CM

## 2023-06-07 DIAGNOSIS — I10 BENIGN ESSENTIAL HYPERTENSION: Primary | ICD-10-CM

## 2023-06-07 DIAGNOSIS — R80.8 OTHER PROTEINURIA: ICD-10-CM

## 2023-06-07 DIAGNOSIS — Z93.6 NEPHROSTOMY STATUS (HCC): ICD-10-CM

## 2023-06-07 PROCEDURE — 99214 OFFICE O/P EST MOD 30 MIN: CPT | Performed by: NURSE PRACTITIONER

## 2023-06-07 NOTE — PATIENT INSTRUCTIONS
You were seen today for hospital follow-up  Kidney function was significantly above baseline during hospitalization but improving and getting close to baseline  Plan/recommendations:  Avoid NSAIDs such as Motrin Aleve ibuprofen and Advil  Tylenol is okay to use for mild pain  Hold sodium bicarbonate tablets    We will be trending blood work  Follow-up blood work in a few weeks along with checking urine protein creatinine ratio  Active orders for blood work available every 4 weeks until blood work is done prior to the next appointment with Dr Christopher Gale September  Call with any questions or concerns

## 2023-06-08 ENCOUNTER — TELEPHONE (OUTPATIENT)
Dept: FAMILY MEDICINE CLINIC | Facility: CLINIC | Age: 68
End: 2023-06-08

## 2023-06-08 NOTE — TELEPHONE ENCOUNTER
----- Message from Fanta Castañeda DO sent at 6/8/2023 12:21 PM EDT -----  Renal function trending down   Hb 9 9

## 2023-06-09 ENCOUNTER — HOME CARE VISIT (OUTPATIENT)
Dept: HOME HEALTH SERVICES | Facility: HOME HEALTHCARE | Age: 68
End: 2023-06-09
Payer: COMMERCIAL

## 2023-06-09 PROCEDURE — G0299 HHS/HOSPICE OF RN EA 15 MIN: HCPCS

## 2023-06-10 NOTE — BRIEF OP NOTE (RAD/CATH)
INTERVENTIONAL RADIOLOGY PROCEDURE NOTE    Date: 5/10/2022    Procedure: IR NEPHROURETERAL STENT CHECK/CHANGE/REPOSITION    Preoperative diagnosis:   1  Obstruction of right ureter         Postoperative diagnosis: Same  Surgeon: Jimbo Rahman MD     Assistant: None  No qualified resident was available  Blood loss: None    Specimens: None     Findings: Antegrade nephrostogram showed patency of the right ureter  The PCNU was exchanged for a right sided 8 5 [place] PCN for capping trial     Complications: None immediate  Anesthesia: conscious sedation and local     Recommendations:  - Tube is currently capped, please monitor for symptoms of infection  If feeling any symptoms, then uncap tube and place to bag drainage  - We will see patient in 1 month for possible removal of the tube  - Please contact IR for any questions or concerns 
Yoanna West

## 2023-06-12 ENCOUNTER — HOME CARE VISIT (OUTPATIENT)
Dept: HOME HEALTH SERVICES | Facility: HOME HEALTHCARE | Age: 68
End: 2023-06-12
Payer: COMMERCIAL

## 2023-06-12 VITALS
RESPIRATION RATE: 16 BRPM | DIASTOLIC BLOOD PRESSURE: 60 MMHG | OXYGEN SATURATION: 97 % | HEART RATE: 69 BPM | SYSTOLIC BLOOD PRESSURE: 102 MMHG | TEMPERATURE: 97.3 F

## 2023-06-12 PROCEDURE — G0155 HHCP-SVS OF CSW,EA 15 MIN: HCPCS

## 2023-06-13 ENCOUNTER — LAB (OUTPATIENT)
Dept: LAB | Facility: CLINIC | Age: 68
End: 2023-06-13
Payer: COMMERCIAL

## 2023-06-13 ENCOUNTER — HOME CARE VISIT (OUTPATIENT)
Dept: HOME HEALTH SERVICES | Facility: HOME HEALTHCARE | Age: 68
End: 2023-06-13
Payer: COMMERCIAL

## 2023-06-13 ENCOUNTER — TELEPHONE (OUTPATIENT)
Dept: NUTRITION | Facility: CLINIC | Age: 68
End: 2023-06-13

## 2023-06-13 ENCOUNTER — TELEPHONE (OUTPATIENT)
Dept: NEPHROLOGY | Facility: CLINIC | Age: 68
End: 2023-06-13

## 2023-06-13 VITALS
RESPIRATION RATE: 16 BRPM | DIASTOLIC BLOOD PRESSURE: 60 MMHG | TEMPERATURE: 97.7 F | SYSTOLIC BLOOD PRESSURE: 98 MMHG | OXYGEN SATURATION: 99 % | HEART RATE: 82 BPM

## 2023-06-13 DIAGNOSIS — Z76.89 ENCOUNTER FOR SUPPORT AND COORDINATION OF TRANSITION OF CARE: ICD-10-CM

## 2023-06-13 DIAGNOSIS — N18.31 CHRONIC KIDNEY DISEASE, STAGE 3A (HCC): ICD-10-CM

## 2023-06-13 DIAGNOSIS — Z93.6 NEPHROSTOMY STATUS (HCC): ICD-10-CM

## 2023-06-13 DIAGNOSIS — N17.9 AKI (ACUTE KIDNEY INJURY) (HCC): ICD-10-CM

## 2023-06-13 DIAGNOSIS — E83.39 HYPERPHOSPHATEMIA: ICD-10-CM

## 2023-06-13 DIAGNOSIS — N17.9 AKI (ACUTE KIDNEY INJURY) (HCC): Primary | ICD-10-CM

## 2023-06-13 DIAGNOSIS — I10 BENIGN ESSENTIAL HYPERTENSION: ICD-10-CM

## 2023-06-13 DIAGNOSIS — R80.8 OTHER PROTEINURIA: ICD-10-CM

## 2023-06-13 LAB
ALBUMIN SERPL BCP-MCNC: 2.8 G/DL (ref 3.5–5)
ANION GAP SERPL CALCULATED.3IONS-SCNC: 6 MMOL/L (ref 4–13)
BASOPHILS # BLD AUTO: 0.04 THOUSANDS/ÂΜL (ref 0–0.1)
BASOPHILS NFR BLD AUTO: 1 % (ref 0–1)
BUN SERPL-MCNC: 32 MG/DL (ref 5–25)
CALCIUM ALBUM COR SERPL-MCNC: 10.1 MG/DL (ref 8.3–10.1)
CALCIUM SERPL-MCNC: 9.1 MG/DL (ref 8.3–10.1)
CHLORIDE SERPL-SCNC: 116 MMOL/L (ref 96–108)
CO2 SERPL-SCNC: 19 MMOL/L (ref 21–32)
CREAT SERPL-MCNC: 1.78 MG/DL (ref 0.6–1.3)
CREAT UR-MCNC: 18.1 MG/DL
EOSINOPHIL # BLD AUTO: 0.21 THOUSAND/ÂΜL (ref 0–0.61)
EOSINOPHIL NFR BLD AUTO: 3 % (ref 0–6)
ERYTHROCYTE [DISTWIDTH] IN BLOOD BY AUTOMATED COUNT: 14.1 % (ref 11.6–15.1)
GFR SERPL CREATININE-BSD FRML MDRD: 29 ML/MIN/1.73SQ M
GLUCOSE SERPL-MCNC: 76 MG/DL (ref 65–140)
HCT VFR BLD AUTO: 31.9 % (ref 34.8–46.1)
HGB BLD-MCNC: 9.3 G/DL (ref 11.5–15.4)
IMM GRANULOCYTES # BLD AUTO: 0.12 THOUSAND/UL (ref 0–0.2)
IMM GRANULOCYTES NFR BLD AUTO: 2 % (ref 0–2)
LYMPHOCYTES # BLD AUTO: 0.91 THOUSANDS/ÂΜL (ref 0.6–4.47)
LYMPHOCYTES NFR BLD AUTO: 11 % (ref 14–44)
MCH RBC QN AUTO: 28.2 PG (ref 26.8–34.3)
MCHC RBC AUTO-ENTMCNC: 29.2 G/DL (ref 31.4–37.4)
MCV RBC AUTO: 97 FL (ref 82–98)
MONOCYTES # BLD AUTO: 0.7 THOUSAND/ÂΜL (ref 0.17–1.22)
MONOCYTES NFR BLD AUTO: 9 % (ref 4–12)
NEUTROPHILS # BLD AUTO: 6.07 THOUSANDS/ÂΜL (ref 1.85–7.62)
NEUTS SEG NFR BLD AUTO: 74 % (ref 43–75)
NRBC BLD AUTO-RTO: 0 /100 WBCS
PHOSPHATE SERPL-MCNC: 4.5 MG/DL (ref 2.3–4.1)
PLATELET # BLD AUTO: 374 THOUSANDS/UL (ref 149–390)
PMV BLD AUTO: 10.7 FL (ref 8.9–12.7)
POTASSIUM SERPL-SCNC: 4.2 MMOL/L (ref 3.5–5.3)
PROT UR-MCNC: 196 MG/DL
PROT/CREAT UR: 10.83 MG/G{CREAT} (ref 0–0.1)
RBC # BLD AUTO: 3.3 MILLION/UL (ref 3.81–5.12)
SODIUM SERPL-SCNC: 141 MMOL/L (ref 135–147)
WBC # BLD AUTO: 8.05 THOUSAND/UL (ref 4.31–10.16)

## 2023-06-13 PROCEDURE — 82570 ASSAY OF URINE CREATININE: CPT

## 2023-06-13 PROCEDURE — 84156 ASSAY OF PROTEIN URINE: CPT

## 2023-06-13 PROCEDURE — 85025 COMPLETE CBC W/AUTO DIFF WBC: CPT

## 2023-06-13 PROCEDURE — 36415 COLL VENOUS BLD VENIPUNCTURE: CPT | Performed by: NURSE PRACTITIONER

## 2023-06-13 PROCEDURE — 80069 RENAL FUNCTION PANEL: CPT | Performed by: NURSE PRACTITIONER

## 2023-06-13 PROCEDURE — G0299 HHS/HOSPICE OF RN EA 15 MIN: HCPCS

## 2023-06-13 NOTE — TELEPHONE ENCOUNTER
Spoke with patient about the following, she expressed understanding and thanked us for the call:    ----- Message from Han sent at 6/13/2023  4:13 PM EDT -----  Please let Ragini know that labs are stable  Creatinine is slowly improving which means that kidney function is slowly improving  We will continue to monitor as we planned during recent office visit  Blood count is also stable we will also continue to monitor blood count  Please ask if she has any questions or concerns I will be happy to call her back

## 2023-06-13 NOTE — RESULT ENCOUNTER NOTE
Please let Ragini know that labs are stable  Creatinine is slowly improving which means that kidney function is slowly improving  We will continue to monitor as we planned during recent office visit  Blood count is also stable we will also continue to monitor blood count  Please ask if she has any questions or concerns I will be happy to call her back

## 2023-06-13 NOTE — TELEPHONE ENCOUNTER
Contacted Ragini to confirm tomorrows appointment  Spoke with Ragini and appointment was changed to 9:30am and confirmed

## 2023-06-14 ENCOUNTER — OFFICE VISIT (OUTPATIENT)
Dept: HEMATOLOGY ONCOLOGY | Facility: CLINIC | Age: 68
End: 2023-06-14
Payer: COMMERCIAL

## 2023-06-14 ENCOUNTER — NUTRITION (OUTPATIENT)
Dept: NUTRITION | Facility: CLINIC | Age: 68
End: 2023-06-14

## 2023-06-14 ENCOUNTER — HOME CARE VISIT (OUTPATIENT)
Dept: HOME HEALTH SERVICES | Facility: HOME HEALTHCARE | Age: 68
End: 2023-06-14
Payer: COMMERCIAL

## 2023-06-14 VITALS
TEMPERATURE: 98.4 F | BODY MASS INDEX: 23.18 KG/M2 | DIASTOLIC BLOOD PRESSURE: 62 MMHG | HEART RATE: 70 BPM | OXYGEN SATURATION: 98 % | RESPIRATION RATE: 16 BRPM | HEIGHT: 59 IN | SYSTOLIC BLOOD PRESSURE: 108 MMHG | WEIGHT: 115 LBS

## 2023-06-14 VITALS — WEIGHT: 115 LBS | BODY MASS INDEX: 23.23 KG/M2

## 2023-06-14 DIAGNOSIS — D47.2 MONOCLONAL GAMMOPATHY: ICD-10-CM

## 2023-06-14 DIAGNOSIS — D47.2 MGUS (MONOCLONAL GAMMOPATHY OF UNKNOWN SIGNIFICANCE): ICD-10-CM

## 2023-06-14 DIAGNOSIS — D50.9 HYPOCHROMIC ANEMIA: ICD-10-CM

## 2023-06-14 DIAGNOSIS — D50.8 IRON DEFICIENCY ANEMIA SECONDARY TO INADEQUATE DIETARY IRON INTAKE: ICD-10-CM

## 2023-06-14 DIAGNOSIS — N17.9 ACUTE KIDNEY INJURY (HCC): ICD-10-CM

## 2023-06-14 DIAGNOSIS — C54.1 ENDOMETRIAL CANCER (HCC): ICD-10-CM

## 2023-06-14 DIAGNOSIS — K91.2 POSTSURGICAL MALABSORPTION: Primary | ICD-10-CM

## 2023-06-14 DIAGNOSIS — Z71.3 NUTRITIONAL COUNSELING: Primary | ICD-10-CM

## 2023-06-14 PROCEDURE — 99214 OFFICE O/P EST MOD 30 MIN: CPT | Performed by: PHYSICIAN ASSISTANT

## 2023-06-14 NOTE — RESULT ENCOUNTER NOTE
Urine protein creatinine ratio shows a bump from prior level but improvement from level 1 month ago  1 month ago protein excretion over 17 g with repeat level 7 g and current level 10 g  Continue to monitor

## 2023-06-14 NOTE — PATIENT INSTRUCTIONS
Nutrition Rx & Recommendations:  Small, frequent meals/snacks may be easier to tolerate than 3 large daily meals  Aim for 5-6 small meals per day (every 2-3 hours)  Include protein at all meals/snacks  Follow a Cancer Preventative Nutrition Pattern: colorful, plant-based, low-fat, avoid added sugars, limit alcohol, include high fiber foods, limit processed meats, limit red meat, choose lean protein sources, use low-fat cooking methods, balance calories with physical activity, avoid excessive weight gain throughout life  Stay hydrated by sipping fluids of choice/tolerance throughout the day  For dry mouth: sip water throughout the day; try very sweet (or tart, as tolerated) drinks; chew gum or suck on hard candy, popsicles, & ice chips; eat easy to swallow foods (such as pureed cooked foods or soups); moisten food with sauce/gravy/salad dressing; do not drink beer, wine, or any type of alcohol; keep lips moist with lip balm; avoid tobacco products & second-hand smoke; try Biotene as needed (see pages 17-18 in your Eating Hints book)  Follow proper oral care; Try baking soda/salt water rinse recipe (mix 3/4 tsp salt + 1 tsp baking soda + 1 qt water; rinse with pain water after using) in Eating Hints book (pg 18)  Brush your teeth before/after meals & before bed     Choose a lean protein + fiber source at all eating occasions  Lifetime vitamin & mineral supplementation recommended after Gastric Bypass Surgery:  Multivitamin BID  Iron 45 mg daily (or per physician depending on medical history and labs)   Vitamin D daily  Calcium Citrate 500 mg TID (do not take with iron, needs to be 2 hrs apart from iron)  B12 1000 mcg daily    Patient choosen goals:  Trial a lactose-free or lactose-reduced diet as needed  Try swapping ice cream for fruit/pb or Greek yogurt to see if this helps your bowels     Follow Up Plan: PRN per patient request   Recommend Referral to Other Providers: none at this time

## 2023-06-14 NOTE — PROGRESS NOTES
[id number] [facility] - Hematology & Medical Oncology  Outpatient Visit Encounter Note      Urmila Craft 74 y.o. female [id number] Date:  6/14/2023    HEMATOLOGICAL HISTORY      Urmila Craft is a 74 y.o. here to establish care with me today, previously followed with Dr. Sheyla Bradley for iron deficiency anemia. She notably has a history of endometrial cancer and has previously received Her, atezolizumab, bevacizumab as clinical trial per note review as well as underwent radiation to pelvis in 2020. Iron panel at the time of initial presentation to Dr. Sheyla Bradley and per his note states iron saturation 14% TIBC 224 iron 31 with an elevated B12. She was previously on oral iron however was transition to Venofer as patient was symptomatic with no improvement of symptoms on the oral iron per chart review. She has seen gastroenterology in the past.  Notably, patient does have history of gastric bypass surgery 2001. SUBJECTIVE        In review of the chart and talking with the patient, last received 200mg IV venofer weekly x 8 from 2/2023 x 4/2023. More recently 5/2023 patient had MONROE secondary to hydronephrosis secondary to endometrial cancer requiring percutaneous nephrostomy tubes. Do not see history of patient having colonoscopy, recommend that she reach out to her gastroenterology team to have this performed. Overall feeling okay today, denies b sx. No blood in stool or urine, dark tarry stool. No sob/reed/cp. I have reviewed the relevant past medical, surgical, social and family history. I have also reviewed allergies and medications for this patient. Review of Systems  Review of Systems   All other systems reviewed and are negative.         OBJECTIVE     Physical Exam  Vitals:    06/14/23 1017   BP: 108/62   BP Location: Left arm   Patient Position: Sitting   Cuff Size: Adult   Pulse: 70   Resp: 16   Temp: 98.4 °F (36.9 °C)   TempSrc: Tympanic   SpO2: 98%   Weight: 52.2 kg (115 lb)   Height: 4' 11" (1.499 m)       Physical Exam  Vitals reviewed. Constitutional:       Appearance: She is not ill-appearing. Cardiovascular:      Rate and Rhythm: Normal rate and regular rhythm. Heart sounds: No murmur heard. Pulmonary:      Effort: No respiratory distress. Breath sounds: Normal breath sounds. Abdominal:      General: There is no distension. Palpations: Abdomen is soft. Musculoskeletal:      Right lower leg: No edema. Left lower leg: No edema. Skin:     General: Skin is warm and dry. Findings: No bruising or rash. Neurological:      Mental Status: She is alert.           Imaging  Relevant imaging reviewed in chart    Labs  Relevant labs reviewed in chart     Component Ref Range & Units 6/13/23 10:45 AM 6/3/23  8:44 AM 6/3/23  8:44 AM 5/31/23  5:06 AM 5/30/23  1:00 PM 5/27/23  2:13 AM 5/26/23  6:20 PM   WBC 4.31 - 10.16 Thousand/uL 8.05   12.65 High   7.60  7.62  9.03  9.84    RBC 3.81 - 5.12 Million/uL 3.30 Low    3.40 Low   2.96 Low   2.40 Low   2.55 Low   3.11 Low     Hemoglobin 11.5 - 15.4 g/dL 9.3 Low    9.9 Low   8.4 Low   6.9 Low Panic  CM  7.2 Low   8.7 Low     Hematocrit 34.8 - 46.1 % 31.9 Low    32.4 Low   26.8 Low   22.7 Low   24.2 Low   29.1 Low     MCV 82 - 98 fL 97   95  91  93  95  94    MCH 26.8 - 34.3 pg 28.2   29.1  28.4  28.8  28.2  28.0    MCHC 31.4 - 37.4 g/dL 29.2 Low    30.6 Low   31.3 Low   30.9 Low   29.8 Low   29.9 Low     RDW 11.6 - 15.1 % 14.1   14.6  15.0  14.7  14.8  14.6    MPV 8.9 - 12.7 fL 10.7   9.4  9.9  9.7  9.4  9.4    Platelets 759 - 854 Thousands/uL 374   292  211  193  254  309    nRBC /100 WBCs 0     0  0     Neutrophils Relative 43 - 75 % 74     80 High   80 High      Immat GRANS % 0 - 2 % 2     2  1     Lymphocytes Relative 14 - 44 % 11 Low      8 Low   8 Low      Monocytes Relative 4 - 12 % 9     8  9     Eosinophils Relative 0 - 6 % 3  5    2  2     Basophils Relative 0 - 1 % 1  0    0  0 Neutrophils Absolute 1.85 - 7.62 Thousands/µL 6.07     6.08  7.12     Immature Grans Absolute 0.00 - 0.20 Thousand/uL 0.12     0.14  0.11     Lymphocytes Absolute 0.60 - 4.47 Thousands/µL 0.91     0.64  0.74     Monocytes Absolute 0.17 - 1.22 Thousand/µL 0.70     0.60  0.82     Eosinophils Absolute 0.00 - 0.61 Thousand/µL 0.21  0.63 High  R    0.13  0.20     Basophils Absolute 0.00 - 0.10 Thousands/µL 0.04  0.00 R    0.03  0.04     Segmented %   81 High  R         Platelet Estimate   Adequate R         Lymphocytes %   7 Low  R         Monocytes %   7 R         Absolute Neutrophils   10.25 High  R         Lymphocytes Absolute   0.89 R         Monocytes Absolute   0.89 R         Total Counted            Anisocytosis   Present         Poikilocytes   Present           Component Ref Range & Units 5/22/23 11:54 AM 5/4/23  1:57 PM 1/28/23  8:13 AM 11/19/21  8:02 AM 10/2/19  5:45 AM   Ferritin 11 - 307 ng/mL 1,036 High   1,618 High  R  467 High  R  1,429 High  R  1,042 High      Component Ref Range & Units 5/22/23 11:54 AM 5/4/23  1:57 PM 1/28/23  8:13 AM 11/19/21  8:02 AM 10/2/19  5:45 AM   Iron Saturation 15 - 50 % 28  39  14 Low   25  17 R    TIBC 250 - 450 ug/dL 218 Low   188 Low   224 Low   374  138 Low     Iron 50 - 170 ug/dL 61  74 CM  31 Low  CM  92 CM  24 Low  CM        ASSESSMENT & PLAN      Diagnosis ICD-10-CM Associated Orders   1. Postsurgical malabsorption  K91.2 CBC and differential     Comprehensive metabolic panel     Folate     Vitamin D 25 hydroxy     Copper Level      2. Iron deficiency anemia secondary to inadequate dietary iron intake  D50.8 Soluble Transferrin Receptor     Retic Count     Iron Panel (Includes Ferritin, Iron Sat%, Iron, and TIBC)      3. Endometrial cancer (HCC)  C54.1       4. Monoclonal gammopathy  D47.2       5. Hypochromic anemia  D50.9 Retic Count     Hemoglobin Electrophoresis     Gene test beta-thalassemia      6.  MGUS (monoclonal gammopathy of unknown significance)  D47.2 CBC and differential     Comprehensive metabolic panel     Protein electrophoresis, serum     Immunoglobulin free LT chains blood     IgG, IgA, IgM     LD,Blood     Beta 2 microglobulin, serum      7. Acute kidney injury ([address])  N17.9 Soluble Transferrin Receptor     Erythropoietin          74 y.o. female presenting for follow-up of iron deficiency anemia, prior concern for anemia secondary to to patient's treatment regimen for endometrial cancer. Patient does have history of gastric bypass surgery, suspect large component of postsurgical malabsorption. · Discussion  · Discussed the etiology of anemia is likely multifactorial including postsurgical malabsorption, recent MONROE, however as stated above patient has had anemia dating back to 2019 at least we will screen for thalassemia. UPEP with immunofixation previously not noting any monoclonal bands. Will proceed with comprehensive plasma cell dyscrasia workup. · Labs as above, will have patient follow up 4-6 weeks with labs prior     All questions were answered to the patient's satisfaction during this encounter. They appreciated and thanked me for spending time with them. The patient knows the contact information for our office and know to reach out for any relevant concerns related to this encounter. For all other listed problems and medical diagnosis in his chart - they are managed by PCP and/or other specialists, which patient acknowledges.         Hematology & Medical Oncology

## 2023-06-15 ENCOUNTER — HOME CARE VISIT (OUTPATIENT)
Dept: HOME HEALTH SERVICES | Facility: HOME HEALTHCARE | Age: 68
End: 2023-06-15
Payer: COMMERCIAL

## 2023-06-15 PROCEDURE — G0299 HHS/HOSPICE OF RN EA 15 MIN: HCPCS

## 2023-06-15 NOTE — PROGRESS NOTES
Assessment/Plan:    Problem List Items Addressed This Visit        Cardiovascular and Mediastinum    Acute deep vein thrombosis (DVT) of proximal vein of lower extremity (HCC)     Continues to be SABAS  Can stop lovenox at this time  Pt understands she would need to restart if recurrence noted  Genitourinary    Chronic kidney disease, stage 3a ([facility] [address] )     Lab Results   Component Value Date    CREATININE 1 78 (H) 06/13/2023    CREATININE 1 81 (H) 06/03/2023    CREATININE 2 04 (H) 05/31/2023    EGFR 29 06/13/2023    EGFR 28 06/03/2023    EGFR 25 05/31/2023     Cr continues to improve  Followed by nephro  May benefit from biopsy should things not improve further however highly suspect ureteric constriction secondary to RT as well as avastin use  Other    Encounter for follow-up surveillance of endometrial cancer - Primary     43-year-old with recurrent stage IB endometrial cancer in remission presents for follow up  Imaging from 5/26/23 reviewed and without evidence of disease recurrence  She is now 6 months off treatment and >2 years since recurrence  Discussed with pt continued surveillance q6 months or sooner with symptoms  She will continue to follow up with urology regarding persistent ureteral stricture likely from radiation  [facility] ([facility] )     I have spent a total time of 45 minutes on 06/16/23 in caring for this patient including Diagnostic results, Risks and benefits of tx options, Impressions, Obtaining or reviewing history   and Communicating with other healthcare professionals           CHIEF COMPLAINT: follow up       Problem:   Cancer Staging   Endometrial cancer [facility])  Staging form: Corpus Uteri - Carcinoma, AJCC 7th Edition  - Clinical stage from 12/19/2017: FIGO Stage IB (T1b, N0, M0) - Signed by Jordon Tom MD on 2/12/2018        Previous therapy:  Oncology History   Endometrial cancer ([facility] [address] )   11/17/2017 Initial Diagnosis    Endometrial cancer ([facility] [address] )     11/17/2017 Biopsy    ENDOMETRIAL BIOPSY: WELL DIFFERENTIATED ENDOMETRIAL ADENOCARCINOMA (FIGO I) WITH FOCALMUCINOUS FEATURES  Part B: ENDOCERVICAL POLYP:BENIGN ENDOCERVICAL      12/19/2017 Surgery    Robotic assisted total laparoscopic hysterectomy with bilateral salpingo-oophorectomy and sentinel bilateral pelvic lymph node dissection  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 (4 4 x 3 2 cm tumor, 9 4/15 4mm invasion, NO LVSI, washings revealed atypical cellular changes)     12/19/2017 Genetic Testing    Morrison testing negative     6/28/2019 Biopsy    A  Breast, Right, US BX Right Breast 1000 4 cmfn:  - Benign breast tissue with focal histiocytic aggregate  See comment   - Negative for atypia and in-situ or invasive carcinoma  7/8/2019 Recurrence    Presented with right lower extremity DVT and CT demonstrating right pelvic sidewall mass with venous, ureteral and nerve compression causing significant neuropathic pain  Biopsy:  Lymph Node, Right pelvic lymph node x3:  - High-grade adenocarcinoma  7/30/2019 - 1/6/2020 Chemotherapy    Taxol 175 mg/m2 and carboplatin AUC 6 every 21 days  Dose was reduced to taxol 135 mg/m2 and carboplatin AUC 5  Completed 6 cycles  Treatment protracted due to multiple hospital admissions       2/24/2020 - 4/13/2020 Radiation    adjuvant external beam radiation therapy to the whole pelvis to 4500 cGy followed by boost to gross disease of an additional 2200 cGy     11/23/2020 Progression    New necrotic adenopathy in the retroperitoneum on CT      12/21/2020 - 12/5/2022 Chemotherapy    Began chemotherapy with rucaparib, atezolizumab, and bevacizumab as per [facility] clinical trial     Rucaparib held cycle 28 secondary to fatigue  Avastin held since cycle 30 for elevated UPC  Treatment held for one cycle after cycle 31 for mucositis/fatigue             Patient ID: Karan Don is a 74 y o  female  29-year-old with recurrent stage IB endometrial cancer in remission presents for follow up  Pt was most recently on EndoBARR and decided to discontinue for worsening symptoms  Repeat imaging with stable findings and SABAS  Pt was admitted 5/2023 with hemorrhagic cystitis/pyelonephritis and bilateral stents were placed  Imaging without evidence of disease recurrence  She was then admitted [facility] at the end of may with worsening kidney function and PCNs were placed  Pt has some leaking from left PCN for which she has f/u with IR  She is seeing urology in July as well  She ahs persistent back pain from tubes  She continues to have some urination as well  Pt denies abdominal bloating/pain/cramping  No vaginal bleeding/discharge  No changes in BM/urination  She has gained weight and her stamina has improved     The following portions of the patient's history were reviewed and updated as appropriate: allergies, current medications, past family history, past medical history, past social history, past surgical history and problem list     Review of Systems   Constitutional: Negative for appetite change, chills, fatigue and fever  Respiratory: Negative for chest tightness and shortness of breath  Gastrointestinal: Negative for abdominal distention, abdominal pain, constipation, diarrhea and nausea  Genitourinary: Positive for difficulty urinating  Negative for flank pain, frequency, urgency, vaginal bleeding, vaginal discharge and vaginal pain  Musculoskeletal: Positive for back pain  Negative for joint swelling and myalgias  Skin: Negative for rash  Neurological: Negative for dizziness, light-headedness, numbness and headaches  Current Outpatient Medications   Medication Sig Dispense Refill   • ARIPiprazole (ABILIFY) 20 MG tablet Take 20 mg by mouth in the morning       • aspirin (ECOTRIN LOW STRENGTH) 81 mg EC tablet Take 81 mg by mouth every evening     • Calcium-Magnesium-Vitamin D (CALCIUM 500 PO) Take 1 capsule by mouth daily     • cholecalciferol (VITAMIN D3) 1,000 units tablet Take 4 tablets (4,000 Units total) by mouth daily 90 tablet 0   • Cranberry-Vitamin C 250-60 MG CAPS Take 1 tablet by mouth in the morning 90 capsule 3   • Cyanocobalamin (VITAMIN B12 PO) Take by mouth daily      • dronabinol (MARINOL) 2 5 mg capsule Take 1 capsule (2 5 mg total) by mouth 2 (two) times a day before meals (Patient taking differently: Take 2 5 mg by mouth 2 (two) times a day before meals per patient taking prn for when she needs help eating) 30 capsule 0   • enoxaparin (Lovenox) 60 mg/0 6 mL Inject 0 6 mL (60 mg total) under the skin every 24 hours 18 mL 0   • folic acid (FOLVITE) 1 mg tablet Take 1 tablet (1 mg total) by mouth daily  0   • Gemtesa 75 MG TABS TAKE 75 MG BY MOUTH IN THE MORNING 90 tablet 2   • Multiple Vitamin (MULTIVITAMIN) tablet Take 1 tablet by mouth daily     • omeprazole (PriLOSEC) 20 mg delayed release capsule Take 20 mg by mouth daily     • oxybutynin (DITROPAN XL) 15 MG 24 hr tablet Take 1 tablet (15 mg total) by mouth daily at bedtime 90 tablet 3   • saccharomyces boulardii (FLORASTOR) 250 mg capsule Take 1 capsule (250 mg total) by mouth 2 (two) times a day  0   • senna (SENOKOT) 8 6 MG tablet Take 1 tablet (8 6 mg total) by mouth 2 (two) times a day as needed for constipation 60 tablet 0   • sodium bicarbonate 650 mg tablet Take 650 mg by mouth 2 (two) times a day     • sodium chloride, PF, 0 9 % 10 mL by Intracatheter route daily Intracatheter flushing daily  May substitute prefilled syringe with normal saline 10 mL vials, 10 mL syringes, and 18 g blunt needles 900 mL 0   • sodium chloride, PF, 0 9 % 10 mL by Intracatheter route daily Please flush each nephrostomy tube with 10 mL of saline, once daily  600 mL 0   • venlafaxine (EFFEXOR) 75 mg tablet Take 75 mg by mouth 3 (three) times a day         No current facility-administered medications for this visit             Objective:    Blood pressure 104/70, pulse 70, temperature "(!) 97 1 °F (36 2 °C), temperature source Temporal, resp  rate 16, height 4' 11\" (1 499 m), weight 50 8 kg (112 lb), not currently breastfeeding  Body mass index is 22 62 kg/m²  Body surface area is 1 44 meters squared  Physical Exam  HENT:      Head: Normocephalic and atraumatic  Nose: Nose normal    Cardiovascular:      Rate and Rhythm: Normal rate and regular rhythm  Pulmonary:      Effort: Pulmonary effort is normal    Abdominal:      General: There is no distension  Palpations: Abdomen is soft  There is no mass  Comments: B/l PCN in place  Left with some granulation tissue around tube   Genitourinary:     Comments: defer  Musculoskeletal:         General: No swelling  Normal range of motion  Cervical back: Normal range of motion  Skin:     General: Skin is warm and dry  Neurological:      General: No focal deficit present  Mental Status: She is alert     Psychiatric:         Mood and Affect: Mood normal            Lab Results   Component Value Date     10/27/2017    K 4 2 06/13/2023     (H) 06/13/2023    CO2 19 (L) 06/13/2023    BUN 32 (H) 06/13/2023    CREATININE 1 78 (H) 06/13/2023    GLUCOSE 219 (H) 12/19/2017    GLUF 89 05/25/2023    CALCIUM 9 1 06/13/2023    CORRECTEDCA 10 1 06/13/2023    AST 7 (L) 05/27/2023    ALT 5 (L) 05/27/2023    ALKPHOS 93 05/27/2023    PROT 6 9 10/27/2017    BILITOT 0 3 10/27/2017    EGFR 29 06/13/2023     Lab Results   Component Value Date    WBC 8 05 06/13/2023    HGB 9 3 (L) 06/13/2023    HCT 31 9 (L) 06/13/2023    MCV 97 06/13/2023     06/13/2023     Lab Results   Component Value Date    NEUTROABS 6 07 06/13/2023        Trend:  No results found for: \"\"     "

## 2023-06-15 NOTE — ASSESSMENT & PLAN NOTE
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

## 2023-06-15 NOTE — ASSESSMENT & PLAN NOTE
Lab Results   Component Value Date    CREATININE 1 78 (H) 06/13/2023    CREATININE 1 81 (H) 06/03/2023    CREATININE 2 04 (H) 05/31/2023    EGFR 29 06/13/2023    EGFR 28 06/03/2023    EGFR 25 05/31/2023     Cr continues to improve  Followed by nephro  May benefit from biopsy should things not improve further however highly suspect ureteric constriction secondary to RT as well as avastin use

## 2023-06-16 ENCOUNTER — TELEPHONE (OUTPATIENT)
Dept: UROLOGY | Facility: AMBULATORY SURGERY CENTER | Age: 68
End: 2023-06-16

## 2023-06-16 ENCOUNTER — HOSPITAL ENCOUNTER (OUTPATIENT)
Dept: RADIOLOGY | Facility: HOSPITAL | Age: 68
Discharge: HOME | End: 2023-06-16
Attending: INTERNAL MEDICINE

## 2023-06-16 ENCOUNTER — OFFICE VISIT (OUTPATIENT)
Dept: GYNECOLOGIC ONCOLOGY | Facility: CLINIC | Age: 68
End: 2023-06-16
Payer: COMMERCIAL

## 2023-06-16 VITALS
WEIGHT: 112 LBS | RESPIRATION RATE: 16 BRPM | SYSTOLIC BLOOD PRESSURE: 104 MMHG | DIASTOLIC BLOOD PRESSURE: 70 MMHG | HEART RATE: 70 BPM | HEIGHT: 59 IN | BODY MASS INDEX: 22.58 KG/M2 | TEMPERATURE: 97.1 F

## 2023-06-16 VITALS
HEART RATE: 87 BPM | SYSTOLIC BLOOD PRESSURE: 98 MMHG | OXYGEN SATURATION: 100 % | DIASTOLIC BLOOD PRESSURE: 60 MMHG | TEMPERATURE: 97.5 F

## 2023-06-16 DIAGNOSIS — Z85.42 ENCOUNTER FOR FOLLOW-UP SURVEILLANCE OF ENDOMETRIAL CANCER: Primary | ICD-10-CM

## 2023-06-16 DIAGNOSIS — N13.30 HYDRONEPHROSIS: ICD-10-CM

## 2023-06-16 DIAGNOSIS — N17.9 ACUTE KIDNEY INJURY (HCC): ICD-10-CM

## 2023-06-16 DIAGNOSIS — N18.9 CHRONIC KIDNEY DISEASE, UNSPECIFIED CKD STAGE: ICD-10-CM

## 2023-06-16 DIAGNOSIS — Z08 ENCOUNTER FOR FOLLOW-UP SURVEILLANCE OF ENDOMETRIAL CANCER: Primary | ICD-10-CM

## 2023-06-16 DIAGNOSIS — I82.4Y1 ACUTE DEEP VEIN THROMBOSIS (DVT) OF PROXIMAL VEIN OF RIGHT LOWER EXTREMITY (HCC): ICD-10-CM

## 2023-06-16 DIAGNOSIS — N18.31 CHRONIC KIDNEY DISEASE, STAGE 3A (HCC): ICD-10-CM

## 2023-06-16 DIAGNOSIS — Z93.6 NEPHROSTOMY STATUS (HCC): ICD-10-CM

## 2023-06-16 PROBLEM — K59.00 CONSTIPATION: Status: RESOLVED | Noted: 2021-01-08 | Resolved: 2023-06-16

## 2023-06-16 PROBLEM — R64 CACHEXIA (HCC): Status: RESOLVED | Noted: 2021-10-06 | Resolved: 2023-06-16

## 2023-06-16 PROBLEM — G89.3 CANCER RELATED PAIN: Status: RESOLVED | Noted: 2019-07-19 | Resolved: 2023-06-16

## 2023-06-16 PROBLEM — T45.1X5A CHEMOTHERAPY-INDUCED NAUSEA: Status: RESOLVED | Noted: 2019-09-18 | Resolved: 2023-06-16

## 2023-06-16 PROBLEM — R30.0 BURNING WITH URINATION: Status: RESOLVED | Noted: 2022-07-08 | Resolved: 2023-06-16

## 2023-06-16 PROBLEM — R06.09 EXERTIONAL DYSPNEA: Status: RESOLVED | Noted: 2021-04-02 | Resolved: 2023-06-16

## 2023-06-16 PROBLEM — N90.89 VULVAR IRRITATION: Status: RESOLVED | Noted: 2022-07-21 | Resolved: 2023-06-16

## 2023-06-16 PROBLEM — E87.6 HYPOKALEMIA: Status: RESOLVED | Noted: 2019-09-19 | Resolved: 2023-06-16

## 2023-06-16 PROBLEM — K12.30 MUCOSITIS: Status: RESOLVED | Noted: 2022-10-03 | Resolved: 2023-06-16

## 2023-06-16 PROBLEM — R26.2 AMBULATORY DYSFUNCTION: Status: RESOLVED | Noted: 2019-10-11 | Resolved: 2023-06-16

## 2023-06-16 PROBLEM — D64.89 DRUG-INDUCED ANEMIA: Status: RESOLVED | Noted: 2022-07-08 | Resolved: 2023-06-16

## 2023-06-16 PROBLEM — R60.0 BILATERAL LOWER EXTREMITY EDEMA: Status: RESOLVED | Noted: 2019-09-30 | Resolved: 2023-06-16

## 2023-06-16 PROBLEM — R11.0 CHEMOTHERAPY-INDUCED NAUSEA: Status: RESOLVED | Noted: 2019-09-18 | Resolved: 2023-06-16

## 2023-06-16 PROBLEM — R53.1 GENERALIZED WEAKNESS: Status: RESOLVED | Noted: 2019-09-29 | Resolved: 2023-06-16

## 2023-06-16 PROBLEM — F11.20 CONTINUOUS OPIOID DEPENDENCE (HCC): Status: RESOLVED | Noted: 2022-04-14 | Resolved: 2023-06-16

## 2023-06-16 PROBLEM — E83.39 HYPERPHOSPHATEMIA: Status: RESOLVED | Noted: 2023-05-04 | Resolved: 2023-06-16

## 2023-06-16 PROCEDURE — 99215 OFFICE O/P EST HI 40 MIN: CPT | Performed by: OBSTETRICS & GYNECOLOGY

## 2023-06-16 RX ORDER — SODIUM BICARBONATE 650 MG/1
650 TABLET ORAL 2 TIMES DAILY
COMMUNITY

## 2023-06-16 NOTE — ASSESSMENT & PLAN NOTE
Continues to be SABAS  Can stop lovenox at this time  Pt understands she would need to restart if recurrence noted

## 2023-06-16 NOTE — TELEPHONE ENCOUNTER
Spoke with patient and scheduled her for appointment on 7/11 at 8:00 am with Dr Bhavya Goodman in [address] office  Office address and location were provided  Patient was thankful for call

## 2023-06-16 NOTE — TELEPHONE ENCOUNTER
----- Message from Saad Owen MD sent at 6/16/2023 10:31 AM EDT -----  Regarding: RE: urinary diversion? Thanks for the message  She should see me in follow-up with not our advanced practitioner for more discussion regarding a possible urinary diversion  I will keep you posted  Thank you  FT  ----- Message -----  From: Liz Alicea MD  Sent: 6/16/2023   9:07 AM EDT  To: Saad Owen MD  Subject: urinary diversion? Morning! So our gal ended up failing her PCN reversal after about 6 months  I suspect this is all radiation stricture/effect  She continues to have no evidence of disease at this time  Given she has now failed 2x, would you consider diversion? I am not sure how much of the ureter would be viable and I know the right kidney is already with poor function  Just wanted to touch base and get your thoughts   She sees your PA in mid July    Thanks  Ziggy

## 2023-06-19 ENCOUNTER — TELEPHONE (OUTPATIENT)
Dept: HOME HEALTH SERVICES | Facility: HOME HEALTHCARE | Age: 68
End: 2023-06-19

## 2023-06-20 ENCOUNTER — HOME CARE VISIT (OUTPATIENT)
Dept: HOME HEALTH SERVICES | Facility: HOME HEALTHCARE | Age: 68
End: 2023-06-20
Payer: COMMERCIAL

## 2023-06-20 VITALS
OXYGEN SATURATION: 95 % | SYSTOLIC BLOOD PRESSURE: 104 MMHG | TEMPERATURE: 97.6 F | HEART RATE: 80 BPM | RESPIRATION RATE: 18 BRPM | DIASTOLIC BLOOD PRESSURE: 62 MMHG

## 2023-06-20 PROCEDURE — G0299 HHS/HOSPICE OF RN EA 15 MIN: HCPCS

## 2023-06-23 ENCOUNTER — HOME CARE VISIT (OUTPATIENT)
Dept: HOME HEALTH SERVICES | Facility: HOME HEALTHCARE | Age: 68
End: 2023-06-23
Payer: COMMERCIAL

## 2023-06-24 ENCOUNTER — HOME CARE VISIT (OUTPATIENT)
Dept: HOME HEALTH SERVICES | Facility: HOME HEALTHCARE | Age: 68
End: 2023-06-24
Payer: COMMERCIAL

## 2023-06-24 VITALS
SYSTOLIC BLOOD PRESSURE: 90 MMHG | TEMPERATURE: 97.8 F | HEART RATE: 94 BPM | DIASTOLIC BLOOD PRESSURE: 60 MMHG | RESPIRATION RATE: 20 BRPM | OXYGEN SATURATION: 99 %

## 2023-06-24 PROCEDURE — G0299 HHS/HOSPICE OF RN EA 15 MIN: HCPCS

## 2023-06-27 ENCOUNTER — APPOINTMENT (OUTPATIENT)
Dept: LAB | Facility: CLINIC | Age: 68
End: 2023-06-27
Payer: COMMERCIAL

## 2023-06-27 ENCOUNTER — TELEPHONE (OUTPATIENT)
Dept: NEPHROLOGY | Facility: CLINIC | Age: 68
End: 2023-06-27

## 2023-06-27 DIAGNOSIS — D50.8 IRON DEFICIENCY ANEMIA SECONDARY TO INADEQUATE DIETARY IRON INTAKE: ICD-10-CM

## 2023-06-27 DIAGNOSIS — D50.9 HYPOCHROMIC ANEMIA: ICD-10-CM

## 2023-06-27 DIAGNOSIS — N18.31 CHRONIC KIDNEY DISEASE, STAGE 3A (HCC): ICD-10-CM

## 2023-06-27 DIAGNOSIS — K91.2 POSTSURGICAL MALABSORPTION: ICD-10-CM

## 2023-06-27 DIAGNOSIS — D47.2 MGUS (MONOCLONAL GAMMOPATHY OF UNKNOWN SIGNIFICANCE): ICD-10-CM

## 2023-06-27 DIAGNOSIS — E83.39 HYPERPHOSPHATEMIA: ICD-10-CM

## 2023-06-27 DIAGNOSIS — Z93.6 NEPHROSTOMY STATUS (HCC): ICD-10-CM

## 2023-06-27 DIAGNOSIS — I10 BENIGN ESSENTIAL HYPERTENSION: ICD-10-CM

## 2023-06-27 DIAGNOSIS — N17.9 AKI (ACUTE KIDNEY INJURY) (HCC): ICD-10-CM

## 2023-06-27 DIAGNOSIS — R80.8 OTHER PROTEINURIA: ICD-10-CM

## 2023-06-27 DIAGNOSIS — N17.9 ACUTE KIDNEY INJURY (HCC): ICD-10-CM

## 2023-06-27 LAB
25(OH)D3 SERPL-MCNC: 35.2 NG/ML (ref 30–100)
ALBUMIN SERPL BCP-MCNC: 3.5 G/DL (ref 3.5–5)
ALP SERPL-CCNC: 100 U/L (ref 34–104)
ALT SERPL W P-5'-P-CCNC: 9 U/L (ref 7–52)
ANION GAP SERPL CALCULATED.3IONS-SCNC: 8 MMOL/L
AST SERPL W P-5'-P-CCNC: 9 U/L (ref 13–39)
BASOPHILS # BLD AUTO: 0.06 THOUSANDS/ÂΜL (ref 0–0.1)
BASOPHILS NFR BLD AUTO: 1 % (ref 0–1)
BILIRUB SERPL-MCNC: 0.22 MG/DL (ref 0.2–1)
BUN SERPL-MCNC: 40 MG/DL (ref 5–25)
CALCIUM SERPL-MCNC: 8.5 MG/DL (ref 8.4–10.2)
CHLORIDE SERPL-SCNC: 108 MMOL/L (ref 96–108)
CO2 SERPL-SCNC: 23 MMOL/L (ref 21–32)
CREAT SERPL-MCNC: 1.63 MG/DL (ref 0.6–1.3)
EOSINOPHIL # BLD AUTO: 0.17 THOUSAND/ÂΜL (ref 0–0.61)
EOSINOPHIL NFR BLD AUTO: 2 % (ref 0–6)
ERYTHROCYTE [DISTWIDTH] IN BLOOD BY AUTOMATED COUNT: 14.1 % (ref 11.6–15.1)
FERRITIN SERPL-MCNC: 829 NG/ML (ref 11–307)
FOLATE SERPL-MCNC: >22.3 NG/ML
GFR SERPL CREATININE-BSD FRML MDRD: 32 ML/MIN/1.73SQ M
GLUCOSE P FAST SERPL-MCNC: 85 MG/DL (ref 65–99)
HCT VFR BLD AUTO: 30.8 % (ref 34.8–46.1)
HGB BLD-MCNC: 9.1 G/DL (ref 11.5–15.4)
IGA SERPL-MCNC: 340 MG/DL (ref 70–400)
IGG SERPL-MCNC: 1700 MG/DL (ref 700–1600)
IGM SERPL-MCNC: 66 MG/DL (ref 40–230)
IMM GRANULOCYTES # BLD AUTO: 0.19 THOUSAND/UL (ref 0–0.2)
IMM GRANULOCYTES NFR BLD AUTO: 2 % (ref 0–2)
IRON SATN MFR SERPL: 20 % (ref 15–50)
IRON SERPL-MCNC: 44 UG/DL (ref 50–170)
LDH SERPL-CCNC: 96 U/L (ref 140–271)
LYMPHOCYTES # BLD AUTO: 0.92 THOUSANDS/ÂΜL (ref 0.6–4.47)
LYMPHOCYTES NFR BLD AUTO: 10 % (ref 14–44)
MCH RBC QN AUTO: 28.5 PG (ref 26.8–34.3)
MCHC RBC AUTO-ENTMCNC: 29.5 G/DL (ref 31.4–37.4)
MCV RBC AUTO: 97 FL (ref 82–98)
MONOCYTES # BLD AUTO: 0.82 THOUSAND/ÂΜL (ref 0.17–1.22)
MONOCYTES NFR BLD AUTO: 9 % (ref 4–12)
NEUTROPHILS # BLD AUTO: 7.07 THOUSANDS/ÂΜL (ref 1.85–7.62)
NEUTS SEG NFR BLD AUTO: 76 % (ref 43–75)
NRBC BLD AUTO-RTO: 0 /100 WBCS
PHOSPHATE SERPL-MCNC: 4.1 MG/DL (ref 2.3–4.1)
PLATELET # BLD AUTO: 289 THOUSANDS/UL (ref 149–390)
PMV BLD AUTO: 9.8 FL (ref 8.9–12.7)
POTASSIUM SERPL-SCNC: 4 MMOL/L (ref 3.5–5.3)
PROT SERPL-MCNC: 7.2 G/DL (ref 6.4–8.4)
RBC # BLD AUTO: 3.19 MILLION/UL (ref 3.81–5.12)
RETICS # AUTO: NORMAL 10*3/UL (ref 14097–95744)
RETICS # CALC: 0.76 % (ref 0.37–1.87)
SODIUM SERPL-SCNC: 139 MMOL/L (ref 135–147)
TIBC SERPL-MCNC: 224 UG/DL (ref 250–450)
WBC # BLD AUTO: 9.23 THOUSAND/UL (ref 4.31–10.16)

## 2023-06-27 PROCEDURE — 83520 IMMUNOASSAY QUANT NOS NONAB: CPT

## 2023-06-27 PROCEDURE — 36415 COLL VENOUS BLD VENIPUNCTURE: CPT

## 2023-06-27 PROCEDURE — 84165 PROTEIN E-PHORESIS SERUM: CPT

## 2023-06-27 PROCEDURE — 85045 AUTOMATED RETICULOCYTE COUNT: CPT

## 2023-06-27 PROCEDURE — 83550 IRON BINDING TEST: CPT

## 2023-06-27 PROCEDURE — 82668 ASSAY OF ERYTHROPOIETIN: CPT

## 2023-06-27 PROCEDURE — 83615 LACTATE (LD) (LDH) ENZYME: CPT

## 2023-06-27 PROCEDURE — 82306 VITAMIN D 25 HYDROXY: CPT

## 2023-06-27 PROCEDURE — 82728 ASSAY OF FERRITIN: CPT

## 2023-06-27 PROCEDURE — 83540 ASSAY OF IRON: CPT

## 2023-06-27 PROCEDURE — 86334 IMMUNOFIX E-PHORESIS SERUM: CPT

## 2023-06-27 PROCEDURE — 82784 ASSAY IGA/IGD/IGG/IGM EACH: CPT

## 2023-06-27 PROCEDURE — 82232 ASSAY OF BETA-2 PROTEIN: CPT

## 2023-06-27 PROCEDURE — 83521 IG LIGHT CHAINS FREE EACH: CPT

## 2023-06-27 PROCEDURE — 83020 HEMOGLOBIN ELECTROPHORESIS: CPT

## 2023-06-27 PROCEDURE — 82525 ASSAY OF COPPER: CPT

## 2023-06-27 PROCEDURE — 85025 COMPLETE CBC W/AUTO DIFF WBC: CPT

## 2023-06-27 PROCEDURE — 82746 ASSAY OF FOLIC ACID SERUM: CPT

## 2023-06-27 PROCEDURE — 84100 ASSAY OF PHOSPHORUS: CPT

## 2023-06-27 PROCEDURE — 80053 COMPREHEN METABOLIC PANEL: CPT | Performed by: PHYSICIAN ASSISTANT

## 2023-06-27 NOTE — TELEPHONE ENCOUNTER
Called and spoke to the patient to relay the message above  Patient expressed understanding and had no further questions or concerns at this time   RFP already ordered

## 2023-06-27 NOTE — TELEPHONE ENCOUNTER
----- Message from Jameson Guerrero MD sent at 6/27/2023  9:42 AM EDT -----  Please let the patient know that creatinine continues to improve at this time  Serum bicarbonate has also improved with oral sodium bicarbonate  Please advise her to continue sodium bicarbonate and repeat renal function panel in 1 month  Thank you

## 2023-06-28 LAB
EPO SERPL-ACNC: 7.1 MIU/ML (ref 2.6–18.5)
KAPPA LC FREE SER-MCNC: 136.6 MG/L (ref 3.3–19.4)
KAPPA LC FREE/LAMBDA FREE SER: 1.48 {RATIO} (ref 0.26–1.65)
LAMBDA LC FREE SERPL-MCNC: 92.5 MG/L (ref 5.7–26.3)

## 2023-06-29 ENCOUNTER — HOME CARE VISIT (OUTPATIENT)
Dept: HOME HEALTH SERVICES | Facility: HOME HEALTHCARE | Age: 68
End: 2023-06-29
Payer: COMMERCIAL

## 2023-06-29 VITALS
SYSTOLIC BLOOD PRESSURE: 100 MMHG | HEART RATE: 88 BPM | TEMPERATURE: 98.6 F | RESPIRATION RATE: 20 BRPM | DIASTOLIC BLOOD PRESSURE: 60 MMHG

## 2023-06-29 LAB
ALBUMIN SERPL ELPH-MCNC: 3.53 G/DL (ref 3.2–5.1)
ALBUMIN SERPL ELPH-MCNC: 47 % (ref 48–70)
ALPHA1 GLOB SERPL ELPH-MCNC: 0.5 G/DL (ref 0.15–0.47)
ALPHA1 GLOB SERPL ELPH-MCNC: 6.7 % (ref 1.8–7)
ALPHA2 GLOB SERPL ELPH-MCNC: 0.89 G/DL (ref 0.42–1.04)
ALPHA2 GLOB SERPL ELPH-MCNC: 11.8 % (ref 5.9–14.9)
B2 MICROGLOB SERPL-MCNC: 7.3 MG/L (ref 0.6–2.4)
BETA GLOB ABNORMAL SERPL ELPH-MCNC: 0.41 G/DL (ref 0.31–0.57)
BETA1 GLOB SERPL ELPH-MCNC: 5.5 % (ref 4.7–7.7)
BETA2 GLOB SERPL ELPH-MCNC: 6.5 % (ref 3.1–7.9)
BETA2+GAMMA GLOB SERPL ELPH-MCNC: 0.49 G/DL (ref 0.2–0.58)
COPPER SERPL-MCNC: 142 UG/DL (ref 80–158)
GAMMA GLOB ABNORMAL SERPL ELPH-MCNC: 1.69 G/DL (ref 0.4–1.66)
GAMMA GLOB SERPL ELPH-MCNC: 22.5 % (ref 6.9–22.3)
IGG/ALB SER: 0.89 {RATIO} (ref 1.1–1.8)
INTERPRETATION UR IFE-IMP: NORMAL
M PEAK ID 2: 4.8 %
M PROTEIN 1 MFR SERPL ELPH: 2.3 %
M PROTEIN 1 SERPL ELPH-MCNC: 0.17 G/DL
M PROTEIN 2 SERPL ELPH-MCNC: 0.36 G/DL
PROT PATTERN SERPL ELPH-IMP: ABNORMAL
PROT SERPL-MCNC: 7.5 G/DL (ref 6.4–8.2)

## 2023-06-29 PROCEDURE — 86334 IMMUNOFIX E-PHORESIS SERUM: CPT | Performed by: STUDENT IN AN ORGANIZED HEALTH CARE EDUCATION/TRAINING PROGRAM

## 2023-06-29 PROCEDURE — G0299 HHS/HOSPICE OF RN EA 15 MIN: HCPCS

## 2023-06-29 PROCEDURE — 84165 PROTEIN E-PHORESIS SERUM: CPT | Performed by: STUDENT IN AN ORGANIZED HEALTH CARE EDUCATION/TRAINING PROGRAM

## 2023-06-30 ENCOUNTER — TELEPHONE (OUTPATIENT)
Dept: HEMATOLOGY ONCOLOGY | Facility: CLINIC | Age: 68
End: 2023-06-30

## 2023-06-30 LAB
HGB A MFR BLD: 2.4 % (ref 1.8–3.2)
HGB A MFR BLD: 97.6 % (ref 96.4–98.8)
HGB F MFR BLD: 0 % (ref 0–2)
HGB FRACT BLD-IMP: NORMAL
HGB S MFR BLD: 0 %

## 2023-06-30 NOTE — TELEPHONE ENCOUNTER
Called patient to discuss results of biclonal gammopathy  M spike is not large enough and free light chain ratio not high enough to warrant bone marrow biopsy at this time, suspect this is reactive to patient's MONROE  We will need to repeat this in 6 months  Patient's ferritin still elevated, soluble transferrin receptor pending  Folate normal   Recheck normal   Hemoglobin at baseline  LD normal     Hemoglobinopathy work-up still pending

## 2023-07-01 LAB — STFR SERPL-SCNC: 13.4 NMOL/L (ref 12.2–27.3)

## 2023-07-06 ENCOUNTER — HOME CARE VISIT (OUTPATIENT)
Dept: HOME HEALTH SERVICES | Facility: HOME HEALTHCARE | Age: 68
End: 2023-07-06
Payer: COMMERCIAL

## 2023-07-06 ENCOUNTER — TELEPHONE (OUTPATIENT)
Dept: HEMATOLOGY ONCOLOGY | Facility: CLINIC | Age: 68
End: 2023-07-06

## 2023-07-06 PROCEDURE — G0299 HHS/HOSPICE OF RN EA 15 MIN: HCPCS

## 2023-07-07 VITALS
DIASTOLIC BLOOD PRESSURE: 60 MMHG | OXYGEN SATURATION: 98 % | TEMPERATURE: 97.9 F | HEART RATE: 82 BPM | SYSTOLIC BLOOD PRESSURE: 102 MMHG | RESPIRATION RATE: 16 BRPM

## 2023-07-11 ENCOUNTER — OFFICE VISIT (OUTPATIENT)
Dept: UROLOGY | Facility: CLINIC | Age: 68
End: 2023-07-11

## 2023-07-11 DIAGNOSIS — N13.5 BILATERAL URETERAL OBSTRUCTION: Primary | ICD-10-CM

## 2023-07-11 LAB — HBB GENE MUT ANL BLD/T: NORMAL

## 2023-07-11 NOTE — PROGRESS NOTES
7/11/2023      No chief complaint on file. Assessment and Plan    74 y.o. female managed by Dr Pat Manuel    1. Bilateral ureteral stricture    Ragini had done well with bilateral ureteral stents for managing her strictures up until now, she had nephrostomy tubes placed in May 2023 for residual hydronephrosis and declining renal function, this is now improved. I recommend removing her ureteral stents. Her main goal is to be tube-free. Briefly explored/explained options for urinary diversion which would involve simple cystectomy with ileal conduit and what this major surgery would entail, recovery, and urostomy care/maintenance thereafter. Need to evaluate her location of (presumably distal) ureteral stricture that she would be a good candidate for ileal conduit with healthy length of mid/proximal ureters. Did discuss this is sometimes a case that is referred to [facility] for reconstructive urology team. First we will perform her cystoscopy, bilateral retrograde pyelograms, removal for ureteral stents which is scheduled for August 1 at [facility], the week later she will have her nephrostomy tubes exchanged, and she will discuss the options for diversion further with Dr. Pat Manuel based on those findings. She is happy with this plan. History of Present Illness  Lashell Melendez is a 74 y.o. female here for evaluation of followup discuss ureteral stents, nephrostomy tubes, and possible future urinary diversion. Endometrial cancer treated with chemoradiation in [id number]. Developed bilateral ureteral strictures. Initially managed with indwelling ureteral stents. Renal function declined this year despite stenting, and nephrostomy tubes were placed bilaterally in May. Renal function improved. Currently all urine drains via nephrostomy tubes with very little urine voided per urethra. She has no hematuria. No febrile infections.  She is interested in becoming tube free and questions if urinary diversion is right for her. Other abdominal surgeries include neisha-en-y gastric bypass in 2001. Review of Systems   Constitutional: Negative for activity change, appetite change, chills, fever and unexpected weight change. HENT: Negative. Respiratory: Negative. Negative for shortness of breath. Cardiovascular: Negative. Negative for chest pain. Gastrointestinal: Negative for abdominal pain, constipation, diarrhea, nausea and vomiting. Endocrine: Negative. Genitourinary: Negative for decreased urine volume, difficulty urinating, dysuria, flank pain, frequency, hematuria and urgency. Musculoskeletal: Negative for back pain and gait problem. Skin: Negative. Allergic/Immunologic: Negative. Neurological: Negative. Hematological: Negative for adenopathy. Does not bruise/bleed easily. Vitals  There were no vitals filed for this visit. Physical Exam  Vitals and nursing note reviewed. Constitutional:       General: She is not in acute distress. Appearance: She is well-developed. She is not diaphoretic. HENT:      Head: Normocephalic and atraumatic. Pulmonary:      Effort: Pulmonary effort is normal.   Abdominal:      General: There is no distension. Tenderness: There is no abdominal tenderness. Hernia: No hernia is present. Comments: Bilateral nephrostomy tubes draining clear yellow urine   Skin:     General: Skin is warm and dry. Neurological:      Mental Status: She is alert and oriented to person, place, and time.       Gait: Gait normal.   Psychiatric:         Speech: Speech normal.         Behavior: Behavior normal.           Past History  Past Medical History:   Diagnosis Date   • Anxiety    • CKD (chronic kidney disease) stage 3, GFR 30-59 ml/min ([facility])    • COVID     Fall 2022   • Depression    • Endometrial cancer ([address] [facility]) 12/2017   • GERD (gastroesophageal reflux disease)    • H/O gastric bypass    • Hard to intubate     pt denies   • History of chemotherapy     started 12/2021endometrial cancer- done 12/23/22   • History of DVT in adulthood     RLE   • Hyperglycemia     vx type 2 dm -- last assessed 4/1/14; resolved 11/7/17   • Hyperlipidemia    • Hypertension     in past- no meds at present   • Iron deficiency anemia     iron infusions weekly   • OAB (overactive bladder)    • Port-A-Cath in place    • Wears glasses      Social History     Socioeconomic History   • Marital status: Single     Spouse name: Not on file   • Number of children: Not on file   • Years of education: Not on file   • Highest education level: Not on file   Occupational History   • Not on file   Tobacco Use   • Smoking status: Never   • Smokeless tobacco: Never   Vaping Use   • Vaping Use: Never used   Substance and Sexual Activity   • Alcohol use: Not Currently   • Drug use: Never   • Sexual activity: Not Currently   Other Topics Concern   • Not on file   Social History Narrative   • Not on file     Social Determinants of Health     Financial Resource Strain: Not on file   Food Insecurity: No Food Insecurity (5/30/2023)    Hunger Vital Sign    • Worried About Running Out of Food in the Last Year: Never true    • Ran Out of Food in the Last Year: Never true   Transportation Needs: No Transportation Needs (5/30/2023)    PRAPARE - Transportation    • Lack of Transportation (Medical): No    • Lack of Transportation (Non-Medical):  No   Physical Activity: Not on file   Stress: Not on file   Social Connections: Not on file   Intimate Partner Violence: Not on file   Housing Stability: Low Risk  (5/30/2023)    Housing Stability Vital Sign    • Unable to Pay for Housing in the Last Year: No    • Number of Places Lived in the Last Year: 1    • Unstable Housing in the Last Year: No     Social History     Tobacco Use   Smoking Status Never   Smokeless Tobacco Never     Family History   Problem Relation Age of Onset   • Hyperlipidemia Mother    • Heart disease Mother    • Ovarian cancer Mother 48   • Colon cancer Mother    • Lymphoma Father    • Breast cancer Sister 61   • No Known Problems Brother    • No Known Problems Brother    • No Known Problems Maternal Grandmother    • Bone cancer Maternal Grandfather    • Uterine cancer Paternal Grandmother    • No Known Problems Paternal Grandfather    • No Known Problems Maternal Aunt    • No Known Problems Maternal Aunt    • No Known Problems Maternal Aunt    • No Known Problems Maternal Aunt    • No Known Problems Paternal Aunt    • No Known Problems Paternal Aunt    • No Known Problems Paternal Aunt    • No Known Problems Paternal Aunt        The following portions of the patient's history were reviewed and updated as appropriate: allergies, current medications, past medical history, past social history, past surgical history and problem list.    Results  No results found for this or any previous visit (from the past 1 hour(s)). ]  No results found for: "PSA"  Lab Results   Component Value Date    GLUCOSE 219 (H) 12/19/2017    CALCIUM 8.5 06/27/2023     10/27/2017    K 4.0 06/27/2023    CO2 23 06/27/2023     06/27/2023    BUN 40 (H) 06/27/2023    CREATININE 1.63 (H) 06/27/2023     Lab Results   Component Value Date    WBC 9.23 06/27/2023    HGB 9.1 (L) 06/27/2023    HCT 30.8 (L) 06/27/2023    MCV 97 06/27/2023     06/27/2023

## 2023-07-11 NOTE — H&P (VIEW-ONLY)
7/11/2023      No chief complaint on file. Assessment and Plan    74 y.o. female managed by Dr Papito Shaver    1. Bilateral ureteral stricture    Ragini had done well with bilateral ureteral stents for managing her strictures up until now, she had nephrostomy tubes placed in May 2023 for residual hydronephrosis and declining renal function, this is now improved. I recommend removing her ureteral stents. Her main goal is to be tube-free. Briefly explored/explained options for urinary diversion which would involve simple cystectomy with ileal conduit and what this major surgery would entail, recovery, and urostomy care/maintenance thereafter. Need to evaluate her location of (presumably distal) ureteral stricture that she would be a good candidate for ileal conduit with healthy length of mid/proximal ureters. Did discuss this is sometimes a case that is referred to [facility] for reconstructive urology team. First we will perform her cystoscopy, bilateral retrograde pyelograms, removal for ureteral stents which is scheduled for August 1 at [facility], the week later she will have her nephrostomy tubes exchanged, and she will discuss the options for diversion further with Dr. Papito Shaver based on those findings. She is happy with this plan. History of Present Illness  Leyla Melendez is a 74 y.o. female here for evaluation of followup discuss ureteral stents, nephrostomy tubes, and possible future urinary diversion. Endometrial cancer treated with chemoradiation in [id number]. Developed bilateral ureteral strictures. Initially managed with indwelling ureteral stents. Renal function declined this year despite stenting, and nephrostomy tubes were placed bilaterally in May. Renal function improved. Currently all urine drains via nephrostomy tubes with very little urine voided per urethra. She has no hematuria. No febrile infections.  She is interested in becoming tube free and questions if urinary diversion is right for her. Other abdominal surgeries include neisha-en-y gastric bypass in 2001. Review of Systems   Constitutional: Negative for activity change, appetite change, chills, fever and unexpected weight change. HENT: Negative. Respiratory: Negative. Negative for shortness of breath. Cardiovascular: Negative. Negative for chest pain. Gastrointestinal: Negative for abdominal pain, constipation, diarrhea, nausea and vomiting. Endocrine: Negative. Genitourinary: Negative for decreased urine volume, difficulty urinating, dysuria, flank pain, frequency, hematuria and urgency. Musculoskeletal: Negative for back pain and gait problem. Skin: Negative. Allergic/Immunologic: Negative. Neurological: Negative. Hematological: Negative for adenopathy. Does not bruise/bleed easily. Vitals  There were no vitals filed for this visit. Physical Exam  Vitals and nursing note reviewed. Constitutional:       General: She is not in acute distress. Appearance: She is well-developed. She is not diaphoretic. HENT:      Head: Normocephalic and atraumatic. Pulmonary:      Effort: Pulmonary effort is normal.   Abdominal:      General: There is no distension. Tenderness: There is no abdominal tenderness. Hernia: No hernia is present. Comments: Bilateral nephrostomy tubes draining clear yellow urine   Skin:     General: Skin is warm and dry. Neurological:      Mental Status: She is alert and oriented to person, place, and time.       Gait: Gait normal.   Psychiatric:         Speech: Speech normal.         Behavior: Behavior normal.           Past History  Past Medical History:   Diagnosis Date   • Anxiety    • CKD (chronic kidney disease) stage 3, GFR 30-59 ml/min ([facility])    • COVID     Fall 2022   • Depression    • Endometrial cancer ([address] [facility]) 12/2017   • GERD (gastroesophageal reflux disease)    • H/O gastric bypass    • Hard to intubate     pt denies   • History of chemotherapy     started 12/2021endometrial cancer- done 12/23/22   • History of DVT in adulthood     RLE   • Hyperglycemia     vx type 2 dm -- last assessed 4/1/14; resolved 11/7/17   • Hyperlipidemia    • Hypertension     in past- no meds at present   • Iron deficiency anemia     iron infusions weekly   • OAB (overactive bladder)    • Port-A-Cath in place    • Wears glasses      Social History     Socioeconomic History   • Marital status: Single     Spouse name: Not on file   • Number of children: Not on file   • Years of education: Not on file   • Highest education level: Not on file   Occupational History   • Not on file   Tobacco Use   • Smoking status: Never   • Smokeless tobacco: Never   Vaping Use   • Vaping Use: Never used   Substance and Sexual Activity   • Alcohol use: Not Currently   • Drug use: Never   • Sexual activity: Not Currently   Other Topics Concern   • Not on file   Social History Narrative   • Not on file     Social Determinants of Health     Financial Resource Strain: Not on file   Food Insecurity: No Food Insecurity (5/30/2023)    Hunger Vital Sign    • Worried About Running Out of Food in the Last Year: Never true    • Ran Out of Food in the Last Year: Never true   Transportation Needs: No Transportation Needs (5/30/2023)    PRAPARE - Transportation    • Lack of Transportation (Medical): No    • Lack of Transportation (Non-Medical):  No   Physical Activity: Not on file   Stress: Not on file   Social Connections: Not on file   Intimate Partner Violence: Not on file   Housing Stability: Low Risk  (5/30/2023)    Housing Stability Vital Sign    • Unable to Pay for Housing in the Last Year: No    • Number of Places Lived in the Last Year: 1    • Unstable Housing in the Last Year: No     Social History     Tobacco Use   Smoking Status Never   Smokeless Tobacco Never     Family History   Problem Relation Age of Onset   • Hyperlipidemia Mother    • Heart disease Mother    • Ovarian cancer Mother 48   • Colon cancer Mother    • Lymphoma Father    • Breast cancer Sister 61   • No Known Problems Brother    • No Known Problems Brother    • No Known Problems Maternal Grandmother    • Bone cancer Maternal Grandfather    • Uterine cancer Paternal Grandmother    • No Known Problems Paternal Grandfather    • No Known Problems Maternal Aunt    • No Known Problems Maternal Aunt    • No Known Problems Maternal Aunt    • No Known Problems Maternal Aunt    • No Known Problems Paternal Aunt    • No Known Problems Paternal Aunt    • No Known Problems Paternal Aunt    • No Known Problems Paternal Aunt        The following portions of the patient's history were reviewed and updated as appropriate: allergies, current medications, past medical history, past social history, past surgical history and problem list.    Results  No results found for this or any previous visit (from the past 1 hour(s)). ]  No results found for: "PSA"  Lab Results   Component Value Date    GLUCOSE 219 (H) 12/19/2017    CALCIUM 8.5 06/27/2023     10/27/2017    K 4.0 06/27/2023    CO2 23 06/27/2023     06/27/2023    BUN 40 (H) 06/27/2023    CREATININE 1.63 (H) 06/27/2023     Lab Results   Component Value Date    WBC 9.23 06/27/2023    HGB 9.1 (L) 06/27/2023    HCT 30.8 (L) 06/27/2023    MCV 97 06/27/2023     06/27/2023

## 2023-07-12 ENCOUNTER — PREP FOR PROCEDURE (OUTPATIENT)
Dept: UROLOGY | Facility: AMBULATORY SURGERY CENTER | Age: 68
End: 2023-07-12

## 2023-07-12 DIAGNOSIS — R39.89 SUSPECTED UTI: ICD-10-CM

## 2023-07-12 DIAGNOSIS — N13.5 BILATERAL URETERAL OBSTRUCTION: Primary | ICD-10-CM

## 2023-07-12 DIAGNOSIS — Z01.810 PREOP CARDIOVASCULAR EXAM: ICD-10-CM

## 2023-07-13 PROBLEM — Z45.2 ENCOUNTER FOR VENOUS ACCESS DEVICE CARE: Status: ACTIVE | Noted: 2023-07-13

## 2023-07-17 ENCOUNTER — HOSPITAL ENCOUNTER (OUTPATIENT)
Dept: INFUSION CENTER | Facility: CLINIC | Age: 68
Discharge: HOME | End: 2023-07-17
Payer: COMMERCIAL

## 2023-07-17 DIAGNOSIS — R80.9 NEPHROTIC RANGE PROTEINURIA: Primary | ICD-10-CM

## 2023-07-17 DIAGNOSIS — C54.1 ENDOMETRIAL CANCER (HCC): ICD-10-CM

## 2023-07-17 DIAGNOSIS — Z45.2 ENCOUNTER FOR VENOUS ACCESS DEVICE CARE: ICD-10-CM

## 2023-07-17 LAB
ALBUMIN SERPL BCP-MCNC: 3.5 G/DL (ref 3.5–5)
ANION GAP SERPL CALCULATED.3IONS-SCNC: 9 MMOL/L
BUN SERPL-MCNC: 44 MG/DL (ref 5–25)
CALCIUM SERPL-MCNC: 8.2 MG/DL (ref 8.4–10.2)
CHLORIDE SERPL-SCNC: 105 MMOL/L (ref 96–108)
CO2 SERPL-SCNC: 20 MMOL/L (ref 21–32)
CREAT SERPL-MCNC: 1.93 MG/DL (ref 0.6–1.3)
GFR SERPL CREATININE-BSD FRML MDRD: 26 ML/MIN/1.73SQ M
GLUCOSE SERPL-MCNC: 49 MG/DL (ref 65–140)
PHOSPHATE SERPL-MCNC: 4.5 MG/DL (ref 2.3–4.1)
POTASSIUM SERPL-SCNC: 4.4 MMOL/L (ref 3.5–5.3)
SODIUM SERPL-SCNC: 134 MMOL/L (ref 135–147)

## 2023-07-17 PROCEDURE — 80069 RENAL FUNCTION PANEL: CPT

## 2023-07-17 NOTE — PROGRESS NOTES
Patient arrives for lab draw today. Noted renal function panel due for draw 7/18 but then additional labs due next week 7/28. L PAC accessed without issue, brisk blood return noted, lab collected without issue. Port flushed well without issue, deaccessed, bandaid in place. Patient states she will get 7/28 labs drawn next week at outpatient lab. Patient aware to schedule next appointment upon DC.

## 2023-07-18 ENCOUNTER — APPOINTMENT (OUTPATIENT)
Dept: LAB | Facility: CLINIC | Age: 68
End: 2023-07-18
Payer: COMMERCIAL

## 2023-07-18 ENCOUNTER — OFFICE VISIT (OUTPATIENT)
Dept: LAB | Facility: CLINIC | Age: 68
End: 2023-07-18
Payer: COMMERCIAL

## 2023-07-18 DIAGNOSIS — R39.89 SUSPECTED UTI: ICD-10-CM

## 2023-07-18 DIAGNOSIS — N17.9 AKI (ACUTE KIDNEY INJURY) (HCC): ICD-10-CM

## 2023-07-18 DIAGNOSIS — N13.5 BILATERAL URETERAL OBSTRUCTION: ICD-10-CM

## 2023-07-18 DIAGNOSIS — N18.31 CHRONIC KIDNEY DISEASE, STAGE 3A (HCC): Primary | ICD-10-CM

## 2023-07-18 DIAGNOSIS — Z01.810 PREOP CARDIOVASCULAR EXAM: ICD-10-CM

## 2023-07-18 PROCEDURE — 87086 URINE CULTURE/COLONY COUNT: CPT

## 2023-07-18 PROCEDURE — 93005 ELECTROCARDIOGRAM TRACING: CPT

## 2023-07-19 LAB
ATRIAL RATE: 65 BPM
P AXIS: 33 DEGREES
PR INTERVAL: 146 MS
QRS AXIS: -30 DEGREES
QRSD INTERVAL: 92 MS
QT INTERVAL: 406 MS
QTC INTERVAL: 422 MS
T WAVE AXIS: 20 DEGREES
VENTRICULAR RATE: 65 BPM

## 2023-07-19 PROCEDURE — 93010 ELECTROCARDIOGRAM REPORT: CPT | Performed by: INTERNAL MEDICINE

## 2023-07-20 LAB — BACTERIA UR CULT: NORMAL

## 2023-07-23 NOTE — ASSESSMENT & PLAN NOTE
73-year-old with recurrent stage IB endometrial cancer who is receiving treatment per the [facility] trial presents for pre cycle 22 visit  CBC, CMP, mag reviewed and all hematologic parameters support continued treatment    UrP:C 1 15 increased from previous     Repeat scan in April per protocol    Continue with dosing pending repeat labs
Discussed with patient possible attempt at capping and/or internalizing in the setting stable/improved disease  She will follow-up with Urology to assess further 
Followed by palliative
Malnutrition Findings:     muscle wasting with temporal mass loss  Continued weight loss with 2 lb weight loss since last visit  Decreased appetite       BMI Findings: Body mass index is 24 96 kg/m²  I discussed with patient increasing protein been continuing with small frequent meals    She has been started on appetite stimulants by palliative care
Managed with medications
4 = No assist / stand by assistance

## 2023-07-25 ENCOUNTER — TELEPHONE (OUTPATIENT)
Dept: FAMILY MEDICINE CLINIC | Facility: CLINIC | Age: 68
End: 2023-07-25

## 2023-07-25 NOTE — PRE-PROCEDURE INSTRUCTIONS
Pre-Surgery Instructions:   Medication Instructions   • ARIPiprazole (ABILIFY) 20 MG tablet Take day of surgery. • aspirin (ECOTRIN LOW STRENGTH) 81 mg EC tablet PER MD   • Calcium-Magnesium-Vitamin D (CALCIUM 500 PO) Stop taking 7 days prior to surgery. • cholecalciferol (VITAMIN D3) 1,000 units tablet Stop taking 7 days prior to surgery. • Cranberry-Vitamin C 250-60 MG CAPS Stop taking 7 days prior to surgery. • Cyanocobalamin (VITAMIN B12 PO) Stop taking 7 days prior to surgery. • dronabinol (MARINOL) 2.5 mg capsule Uses PRN- DO NOT take day of surgery   • folic acid (FOLVITE) 1 mg tablet Stop taking 7 days prior to surgery. • Gemtesa 75 MG TABS Hold day of surgery. • Multiple Vitamin (MULTIVITAMIN) tablet Stop taking 7 days prior to surgery. • omeprazole (PriLOSEC) 20 mg delayed release capsule Take day of surgery. • oxybutynin (DITROPAN XL) 15 MG 24 hr tablet Hold day of surgery. • saccharomyces boulardii (FLORASTOR) 250 mg capsule Take night before surgery   • senna (SENOKOT) 8.6 MG tablet Take night before surgery   • sodium bicarbonate 650 mg tablet Take day of surgery. • sodium chloride, PF, 0.9 % Take day of surgery. • [DISCONTINUED] sodium chloride, PF, 0.9 % Take day of surgery. Medication instructions for day surgery reviewed. Please use only a sip of water to take your instructed medications. Avoid all over the counter vitamins, supplements and NSAIDS for one week prior to surgery per anesthesia guidelines. Tylenol is ok to take as needed. You will receive a call one business day prior to surgery with an arrival time and hospital directions. If your surgery is scheduled on a Monday, the hospital will be calling you on the Friday prior to your surgery. If you have not heard from anyone by 8pm, please call the hospital supervisor through the hospital  at [phone].     Do not eat or drink anything after midnight the night before your surgery, including candy, mints, lifesavers, or chewing gum. Do not drink alcohol 24hrs before your surgery. Try not to smoke at least 24hrs before your surgery. Follow the pre surgery showering instructions as listed in the [facility] Experience Booklet” or otherwise provided by your surgeon's office. Do not shave the surgical area 24 hours before surgery. Do not apply any lotions, creams, including makeup, cologne, deodorant, or perfumes after showering on the day of your surgery. No contact lenses, eye make-up, or artificial eyelashes. Remove nail polish, including gel polish, and any artificial, gel, or acrylic nails if possible. Remove all jewelry including rings and body piercing jewelry. Wear causal clothing that is easy to take on and off. Consider your type of surgery. Keep any valuables, jewelry, piercings at home. Please bring any specially ordered equipment (sling, braces) if indicated. Arrange for a responsible person to drive you to and from the hospital on the day of your surgery. Visitor Guidelines discussed. Call the surgeon's office with any new illnesses, exposures, or additional questions prior to surgery. Please reference your [facility] Experience Booklet” for additional information to prepare for your upcoming surgery.

## 2023-07-25 NOTE — TELEPHONE ENCOUNTER
Patient called states she's having surgery on 8/1 and wants to know if she should stop her aspirin. If so when?

## 2023-07-26 ENCOUNTER — TELEPHONE (OUTPATIENT)
Dept: RADIOLOGY | Facility: HOSPITAL | Age: 68
End: 2023-07-26

## 2023-07-26 RX ORDER — CEFAZOLIN SODIUM 1 G/50ML
1000 SOLUTION INTRAVENOUS ONCE
OUTPATIENT
Start: 2023-07-26 | End: 2023-07-26

## 2023-07-27 ENCOUNTER — APPOINTMENT (OUTPATIENT)
Dept: LAB | Facility: CLINIC | Age: 68
End: 2023-07-27
Payer: COMMERCIAL

## 2023-07-27 DIAGNOSIS — R80.9 NEPHROTIC RANGE PROTEINURIA: ICD-10-CM

## 2023-07-27 DIAGNOSIS — N17.9 AKI (ACUTE KIDNEY INJURY) (HCC): ICD-10-CM

## 2023-07-27 DIAGNOSIS — N18.31 STAGE 3A CHRONIC KIDNEY DISEASE (HCC): ICD-10-CM

## 2023-07-27 DIAGNOSIS — N25.81 SECONDARY HYPERPARATHYROIDISM (HCC): ICD-10-CM

## 2023-07-27 DIAGNOSIS — N18.31 CHRONIC KIDNEY DISEASE, STAGE 3A (HCC): ICD-10-CM

## 2023-07-27 LAB
ALBUMIN SERPL BCP-MCNC: 2.7 G/DL (ref 3.5–5)
ANION GAP SERPL CALCULATED.3IONS-SCNC: 6 MMOL/L
BUN SERPL-MCNC: 50 MG/DL (ref 5–25)
CALCIUM ALBUM COR SERPL-MCNC: 9.8 MG/DL (ref 8.3–10.1)
CALCIUM SERPL-MCNC: 8.8 MG/DL (ref 8.3–10.1)
CHLORIDE SERPL-SCNC: 113 MMOL/L (ref 96–108)
CO2 SERPL-SCNC: 20 MMOL/L (ref 21–32)
CREAT SERPL-MCNC: 2.18 MG/DL (ref 0.6–1.3)
CREAT UR-MCNC: <13 MG/DL
CREAT UR-MCNC: <13 MG/DL
GFR SERPL CREATININE-BSD FRML MDRD: 23 ML/MIN/1.73SQ M
GLUCOSE SERPL-MCNC: 78 MG/DL (ref 65–140)
MICROALBUMIN UR-MCNC: 58.6 MG/L (ref 0–20)
MICROALBUMIN/CREAT 24H UR: >451 MG/G CREATININE (ref 0–30)
PHOSPHATE SERPL-MCNC: 4.3 MG/DL (ref 2.3–4.1)
POTASSIUM SERPL-SCNC: 4.3 MMOL/L (ref 3.5–5.3)
PROT UR-MCNC: 28 MG/DL
PTH-INTACT SERPL-MCNC: 246.5 PG/ML (ref 12–88)
SODIUM SERPL-SCNC: 139 MMOL/L (ref 135–147)

## 2023-07-27 PROCEDURE — 36415 COLL VENOUS BLD VENIPUNCTURE: CPT

## 2023-07-27 PROCEDURE — 80069 RENAL FUNCTION PANEL: CPT

## 2023-07-27 PROCEDURE — 83970 ASSAY OF PARATHORMONE: CPT

## 2023-07-27 PROCEDURE — 82570 ASSAY OF URINE CREATININE: CPT

## 2023-07-27 PROCEDURE — 82043 UR ALBUMIN QUANTITATIVE: CPT

## 2023-07-27 PROCEDURE — 84156 ASSAY OF PROTEIN URINE: CPT

## 2023-07-28 ENCOUNTER — HOSPITAL ENCOUNTER (OUTPATIENT)
Dept: RADIOLOGY | Age: 68
Discharge: HOME | End: 2023-07-28
Payer: COMMERCIAL

## 2023-07-28 DIAGNOSIS — N17.9 AKI (ACUTE KIDNEY INJURY) (HCC): Primary | ICD-10-CM

## 2023-07-28 DIAGNOSIS — Z12.31 SCREENING MAMMOGRAM, ENCOUNTER FOR: ICD-10-CM

## 2023-07-28 PROCEDURE — 77063 BREAST TOMOSYNTHESIS BI: CPT

## 2023-07-28 PROCEDURE — 77067 SCR MAMMO BI INCL CAD: CPT

## 2023-07-31 ENCOUNTER — ANESTHESIA EVENT (OUTPATIENT)
Dept: PERIOP | Facility: HOSPITAL | Age: 68
End: 2023-07-31
Payer: COMMERCIAL

## 2023-07-31 NOTE — ANESTHESIA PREPROCEDURE EVALUATION
Procedure:  CYSTOSCOPY RETROGRADE PYELOGRAM WITH INSERTION STENT URETERAL (Bilateral: Bladder)  REMOVAL STENT URETERAL (Bilateral: Bladder)    Relevant Problems   CARDIO   (+) Acute deep vein thrombosis (DVT) of proximal vein of lower extremity (HCC)   (+) Benign essential hypertension      /RENAL   (+) Acute on chronic kidney disease   (+) Chronic kidney disease   (+) Chronic kidney disease, stage 3a (HCC)   (+) Nephrotoxicity      GYN   (+) Endometrial cancer (HCC)      HEMATOLOGY   (+) Anemia   (+) Iron deficiency anemia secondary to inadequate dietary iron intake      MUSCULOSKELETAL   (+) Acute left-sided low back pain without sciatica   (+) Bilateral piriformis syndrome   (+) Myofascial pain syndrome      NEURO/PSYCH   (+) Chronic pain syndrome   (+) Depression, recurrent (HCC)   (+) Major depression, chronic   (+) Myofascial pain syndrome   Hx of difficult intubation in chart, but no supporting documentation/details. Patient denies ever being told she is a difficult airway. Hx difficulty passing TRINH probe per chart. Easy mask and easy intubation w rivera 3 on 5/2023 for nephrostomy tube placement    Prev lma 3    Recent labs personally reviewed:  Lab Results   Component Value Date    WBC 9.23 06/27/2023    HGB 9.1 (L) 06/27/2023     06/27/2023     Lab Results   Component Value Date     10/27/2017    K 4.3 07/27/2023    BUN 50 (H) 07/27/2023    CREATININE 2.18 (H) 07/27/2023    GLUCOSE 219 (H) 12/19/2017     Lab Results   Component Value Date    PTT 72 (H) 05/31/2023      Lab Results   Component Value Date    INR 1.09 05/26/2023       Lab Results   Component Value Date    HGBA1C 5.2 10/31/2022       Type and Screen:  O    TTE 2022  Interpretation Summary       •  Left Ventricle: Left ventricular cavity size is normal. Wall thickness is mildly increased. The left ventricular ejection fraction is 60%. Systolic function is normal. Global longitudinal strain is normal at -20%.  Wall motion is normal. Diastolic function is mildly abnormal, consistent with grade I (abnormal) relaxation. •  Right Ventricle: Right ventricular cavity size is mildly dilated. Physical Exam    Airway    Mallampati score: II  TM Distance: >3 FB  Neck ROM: full     Dental       Cardiovascular      Pulmonary      Other Findings        Anesthesia Plan  ASA Score- 3     Anesthesia Type- IV sedation with anesthesia with ASA Monitors. Additional Monitors:   Airway Plan:     Comment: Supplemental O2, etco2 monitoring  . Plan Factors-    Chart reviewed. Patient summary reviewed. Induction- intravenous. Postoperative Plan-     Informed Consent- Anesthetic plan and risks discussed with patient. I personally reviewed this patient with the CRNA. Discussed and agreed on the Anesthesia Plan with the CRNA. Tai Correa

## 2023-08-01 ENCOUNTER — HOSPITAL ENCOUNTER (OUTPATIENT)
Facility: HOSPITAL | Age: 68
Setting detail: OUTPATIENT SURGERY
Discharge: HOME | End: 2023-08-01
Attending: UROLOGY | Admitting: UROLOGY
Payer: COMMERCIAL

## 2023-08-01 ENCOUNTER — APPOINTMENT (OUTPATIENT)
Dept: RADIOLOGY | Facility: HOSPITAL | Age: 68
End: 2023-08-01
Payer: COMMERCIAL

## 2023-08-01 ENCOUNTER — ANESTHESIA (OUTPATIENT)
Dept: PERIOP | Facility: HOSPITAL | Age: 68
End: 2023-08-01
Payer: COMMERCIAL

## 2023-08-01 ENCOUNTER — TELEPHONE (OUTPATIENT)
Dept: OTHER | Facility: HOSPITAL | Age: 68
End: 2023-08-01

## 2023-08-01 ENCOUNTER — TELEPHONE (OUTPATIENT)
Dept: RADIOLOGY | Facility: HOSPITAL | Age: 68
End: 2023-08-01

## 2023-08-01 VITALS
HEART RATE: 73 BPM | SYSTOLIC BLOOD PRESSURE: 103 MMHG | OXYGEN SATURATION: 98 % | RESPIRATION RATE: 20 BRPM | WEIGHT: 125 LBS | TEMPERATURE: 98.7 F | BODY MASS INDEX: 25.2 KG/M2 | DIASTOLIC BLOOD PRESSURE: 62 MMHG | HEIGHT: 59 IN

## 2023-08-01 DIAGNOSIS — N13.39 OTHER HYDRONEPHROSIS: ICD-10-CM

## 2023-08-01 PROCEDURE — C1769 GUIDE WIRE: HCPCS | Performed by: UROLOGY

## 2023-08-01 PROCEDURE — 52310 CYSTOSCOPY AND TREATMENT: CPT | Performed by: UROLOGY

## 2023-08-01 PROCEDURE — 87077 CULTURE AEROBIC IDENTIFY: CPT | Performed by: UROLOGY

## 2023-08-01 PROCEDURE — 74420 UROGRAPHY RTRGR +-KUB: CPT

## 2023-08-01 PROCEDURE — 87086 URINE CULTURE/COLONY COUNT: CPT | Performed by: UROLOGY

## 2023-08-01 RX ORDER — CIPROFLOXACIN 250 MG/1
250 TABLET, FILM COATED ORAL EVERY 24 HOURS
Qty: 3 TABLET | Refills: 0 | Status: SHIPPED | OUTPATIENT
Start: 2023-08-01 | End: 2023-08-01

## 2023-08-01 RX ORDER — FENTANYL CITRATE 50 UG/ML
INJECTION, SOLUTION INTRAMUSCULAR; INTRAVENOUS AS NEEDED
Status: DISCONTINUED | OUTPATIENT
Start: 2023-08-01 | End: 2023-08-01

## 2023-08-01 RX ORDER — SODIUM CHLORIDE, SODIUM LACTATE, POTASSIUM CHLORIDE, CALCIUM CHLORIDE 600; 310; 30; 20 MG/100ML; MG/100ML; MG/100ML; MG/100ML
INJECTION, SOLUTION INTRAVENOUS CONTINUOUS PRN
Status: DISCONTINUED | OUTPATIENT
Start: 2023-08-01 | End: 2023-08-01

## 2023-08-01 RX ORDER — MAGNESIUM HYDROXIDE 1200 MG/15ML
LIQUID ORAL AS NEEDED
Status: DISCONTINUED | OUTPATIENT
Start: 2023-08-01 | End: 2023-08-01 | Stop reason: HOSPADM

## 2023-08-01 RX ORDER — CEFAZOLIN SODIUM 1 G/50ML
1000 SOLUTION INTRAVENOUS ONCE
Status: COMPLETED | OUTPATIENT
Start: 2023-08-01 | End: 2023-08-01

## 2023-08-01 RX ORDER — LIDOCAINE HYDROCHLORIDE 10 MG/ML
INJECTION, SOLUTION EPIDURAL; INFILTRATION; INTRACAUDAL; PERINEURAL AS NEEDED
Status: DISCONTINUED | OUTPATIENT
Start: 2023-08-01 | End: 2023-08-01

## 2023-08-01 RX ORDER — FENTANYL CITRATE/PF 50 MCG/ML
25 SYRINGE (ML) INJECTION
Status: DISCONTINUED | OUTPATIENT
Start: 2023-08-01 | End: 2023-08-01 | Stop reason: HOSPADM

## 2023-08-01 RX ORDER — PROPOFOL 10 MG/ML
INJECTION, EMULSION INTRAVENOUS CONTINUOUS PRN
Status: DISCONTINUED | OUTPATIENT
Start: 2023-08-01 | End: 2023-08-01

## 2023-08-01 RX ORDER — DEXAMETHASONE SODIUM PHOSPHATE 10 MG/ML
INJECTION, SOLUTION INTRAMUSCULAR; INTRAVENOUS AS NEEDED
Status: DISCONTINUED | OUTPATIENT
Start: 2023-08-01 | End: 2023-08-01

## 2023-08-01 RX ORDER — MIDAZOLAM HYDROCHLORIDE 2 MG/2ML
INJECTION, SOLUTION INTRAMUSCULAR; INTRAVENOUS AS NEEDED
Status: DISCONTINUED | OUTPATIENT
Start: 2023-08-01 | End: 2023-08-01

## 2023-08-01 RX ORDER — ONDANSETRON 2 MG/ML
INJECTION INTRAMUSCULAR; INTRAVENOUS AS NEEDED
Status: DISCONTINUED | OUTPATIENT
Start: 2023-08-01 | End: 2023-08-01

## 2023-08-01 RX ORDER — CIPROFLOXACIN 250 MG/1
250 TABLET, FILM COATED ORAL EVERY 24 HOURS
Qty: 3 TABLET | Refills: 0 | Status: SHIPPED | OUTPATIENT
Start: 2023-08-01 | End: 2023-08-06

## 2023-08-01 RX ADMIN — ONDANSETRON 4 MG: 2 INJECTION INTRAMUSCULAR; INTRAVENOUS at 08:36

## 2023-08-01 RX ADMIN — PROPOFOL 50 MCG/KG/MIN: 10 INJECTION, EMULSION INTRAVENOUS at 08:43

## 2023-08-01 RX ADMIN — FENTANYL CITRATE 25 MCG: 50 INJECTION INTRAMUSCULAR; INTRAVENOUS at 08:40

## 2023-08-01 RX ADMIN — FENTANYL CITRATE 25 MCG: 50 INJECTION INTRAMUSCULAR; INTRAVENOUS at 08:44

## 2023-08-01 RX ADMIN — DEXAMETHASONE SODIUM PHOSPHATE 10 MG: 10 INJECTION, SOLUTION INTRAMUSCULAR; INTRAVENOUS at 08:45

## 2023-08-01 RX ADMIN — MIDAZOLAM 1 MG: 1 INJECTION INTRAMUSCULAR; INTRAVENOUS at 08:36

## 2023-08-01 RX ADMIN — SODIUM CHLORIDE, SODIUM LACTATE, POTASSIUM CHLORIDE, AND CALCIUM CHLORIDE: .6; .31; .03; .02 INJECTION, SOLUTION INTRAVENOUS at 08:08

## 2023-08-01 RX ADMIN — LIDOCAINE HYDROCHLORIDE 50 MG: 10 INJECTION, SOLUTION EPIDURAL; INFILTRATION; INTRACAUDAL; PERINEURAL at 08:42

## 2023-08-01 RX ADMIN — CEFAZOLIN SODIUM 1000 MG: 1 SOLUTION INTRAVENOUS at 08:36

## 2023-08-01 NOTE — TELEPHONE ENCOUNTER
Patient under care of Dr. Virgilio Siegel    Patient calling due to: Cipro was prescribed for 5 days but only 3 tablets called in. Could someone call the pharmacist to clarify?     Pharmacist can be reached at [phone]

## 2023-08-01 NOTE — TELEPHONE ENCOUNTER
Today I removed both of Brandy stents. She has bilateral nephrostomy tubes in place. Please arrange for follow-up with me in the next 4 to 6 weeks.

## 2023-08-01 NOTE — INTERVAL H&P NOTE
H&P reviewed. After examining the patient I find no changes in the patients condition since the H&P had been written. Vitals:    08/01/23 0651   BP: (!) 96/48   Pulse: 64   Resp: 18   Temp: 98.6 °F (37 °C)   SpO2: 100%     History of bilateral ureteral strictures secondary to history of endometrial carcinoma treated with chemoradiation. At this time she has ureteral stents and as well as nephrostomy tubes. We have attempted to manage her with stents and this has been unsuccessful. Unfortunately at this time she appears to be committed to nephrostomy tubes with possible consideration for ileal conduit diversion in the future if she were to be deemed a candidate. For now I recommend cystoscopy with bilateral retrograde pyelograms and removal of her bilateral stents. Both nephrostomy tubes will remain open to gravity drainage.

## 2023-08-01 NOTE — OP NOTE
OPERATIVE REPORT  PATIENT NAME: Juan Toscano    :  1955  MRN: [id number]  Pt Location: [facility]OM 01    SURGERY DATE: 2023    Surgeon(s) and Role:     * Chris Singh MD - Primary    Preop Diagnosis:  Other hydronephrosis [N13.39]    Post-Op Diagnosis Codes:     * Other hydronephrosis [N13.39]    Procedure(s):  Bilateral - CYSTOSCOPY BILATERAL RETROGRADE  WITH REMOVAL BILATERAL  STENT URETERAL  Bilateral - REMOVAL STENT URETERAL    Specimen(s):  ID Type Source Tests Collected by Time Destination   A : urine culture  Urine Urine, Cystoscopic URINE CULTURE Chris Singh MD 2023 [id number]        Estimated Blood Loss:   Minimal    Drains:  Nephrostomy Left 10.2 Fr. (Active)   Number of days: 66       Nephrostomy Right 10.2 Fr. (Active)   Number of days: 66       Ureteral Internal Stent Right ureter 6 Fr. (Active)   Number of days: 124       Ureteral Internal Stent Left ureter 7 Fr. (Active)   Number of days: 84       Ureteral Internal Stent Right ureter 7 Fr. (Active)   Number of days: 84       Anesthesia Type:   General    Operative Indications: Other hydronephrosis [N13.39]      Operative Findings:  Narrowing of the ureters bilaterally. Complications:   None    Procedure and Technique:  Muriel Guerra is a 72-year-old female with a history of endometrial carcinoma treated previously with chemo and radiation. She has developed bilateral distal ureteral strictures. I had previously attempted to manage her with bilateral indwelling double-J ureteral stents. She failed this management and ultimately has been managed with bilateral nephrostomy tubes. I have recommended stent removal in the office but the patient request that stents be removed in the operating room with anesthesia. Risks of the procedure including, but not limited to bleeding, infection, sepsis, ureteral injury, and need for permanent nephrostomy tube drainage was discussed and reviewed.   Informed consent obtained. The patient was brought to the operating room in August 1, 2023. After the smooth induction of IV anesthesia, patient was placed in dorsolithotomy position. Her genitalia prepped and draped in sterile fashion. Intravenous antibiotics were administered. A timeout was performed with all members the operative team confirming the patient's identity and procedure to be performed. A 22 Finnish rigid cystoscope with 30 degree lens was inserted. The bladder was inspected. A urine culture was obtained. The bladder capacity appeared small. Bilateral stents were identified. First I pulled the right stent to the urethral meatus and placed a wire. The stent was removed. The right nephrostomy tube remained in proper position. A right retrograde pyelogram was performed showing narrowing of the ureter distal to the pelvic brim. Otherwise there were no filling defects. The stent was removed. The same procedure was then repeated on the patient's left side. Again narrowing of the ureter from the pelvic brim distally was identified but no filling defects otherwise. The left stent was removed. The left nephrostomy tube remained in place. Overall the patient tolerated the procedure well and there were no complications. The patient was transferred the PACU in stable condition at the conclusion of the case.     Patient Disposition:  PACU stable        SIGNATURE: Elena Curran MD  DATE: August 1, 2023  TIME: 9:18 AM

## 2023-08-01 NOTE — ANESTHESIA POSTPROCEDURE EVALUATION
Post-Op Assessment Note    CV Status:  Stable  Pain Score: 0    Pain management: adequate     Mental Status:  Alert and awake   Hydration Status:  Euvolemic   PONV Controlled:  Controlled   Airway Patency:  Patent      Post Op Vitals Reviewed: Yes      Staff: CRNA         There were no known notable events for this encounter.     BP  106/55   Temp   98.3   Pulse  89   Resp   20   SpO2   99% ra

## 2023-08-02 ENCOUNTER — LAB (OUTPATIENT)
Dept: LAB | Facility: CLINIC | Age: 68
End: 2023-08-02
Payer: COMMERCIAL

## 2023-08-02 ENCOUNTER — TELEPHONE (OUTPATIENT)
Dept: FAMILY MEDICINE CLINIC | Facility: CLINIC | Age: 68
End: 2023-08-02

## 2023-08-02 DIAGNOSIS — N17.9 AKI (ACUTE KIDNEY INJURY) (HCC): ICD-10-CM

## 2023-08-02 LAB
ALBUMIN SERPL BCP-MCNC: 3.5 G/DL (ref 3.5–5)
ANION GAP SERPL CALCULATED.3IONS-SCNC: 10 MMOL/L
BUN SERPL-MCNC: 48 MG/DL (ref 5–25)
CALCIUM SERPL-MCNC: 8 MG/DL (ref 8.4–10.2)
CHLORIDE SERPL-SCNC: 103 MMOL/L (ref 96–108)
CO2 SERPL-SCNC: 24 MMOL/L (ref 21–32)
CREAT SERPL-MCNC: 1.98 MG/DL (ref 0.6–1.3)
GFR SERPL CREATININE-BSD FRML MDRD: 25 ML/MIN/1.73SQ M
GLUCOSE SERPL-MCNC: 73 MG/DL (ref 65–140)
PHOSPHATE SERPL-MCNC: 4.3 MG/DL (ref 2.3–4.1)
POTASSIUM SERPL-SCNC: 4 MMOL/L (ref 3.5–5.3)
SODIUM SERPL-SCNC: 137 MMOL/L (ref 135–147)

## 2023-08-02 PROCEDURE — 80069 RENAL FUNCTION PANEL: CPT

## 2023-08-02 PROCEDURE — 36415 COLL VENOUS BLD VENIPUNCTURE: CPT

## 2023-08-02 NOTE — TELEPHONE ENCOUNTER
Post Op Note    Dixon Castro is a 74 y.o. female s/pBilateral - CYSTOSCOPY BILATERAL RETROGRADE  WITH REMOVAL BILATERAL  STENT URETERAL  Bilateral - REMOVAL STENT URETERAL  performed 8/1/2023. Dixon Castro is a patient of Dr. Ornelas July and is seen at the [facility] office. Left message for patient seeing how she was doing after her procedure yesterday. Advised her to call back with any questions or concerns that she may have. Reminded her of her follow-up appointment on 8/30 at 1:30 in the [facility] office. Informed her to call back if this time and date does not work. Advised I will try to reach out to her again tomorrow.

## 2023-08-02 NOTE — TELEPHONE ENCOUNTER
----- Message from Moisés Fox DO sent at 8/2/2023  2:35 PM EDT -----  No evidence of malignancy - annual screening advised

## 2023-08-03 LAB
BACTERIA UR CULT: ABNORMAL

## 2023-08-03 NOTE — TELEPHONE ENCOUNTER
Post Op Note    Cristi Tineo is a 74 y.o. female s/p Bilateral - CYSTOSCOPY BILATERAL RETROGRADE  WITH REMOVAL BILATERAL  STENT URETERAL  Bilateral - REMOVAL STENT URETERAL performed 8/1/2023. Cristi Tineo is a patient of Dr. Frank Cox and is seen at the [facility] office. How would you rate your pain on a scale from 1 to 10, 10 being the worst pain ever? 0  Have you had a fever? No  Have your bowel movements been regular? Yes  Do you have any difficulty urinating? No  Do you have any other questions or concerns that I can address at this time? None at this time. Patient states that she is doing well at this time with no issues. Confirmed follow up appointment and advised to call sooner with any issues or concerns.

## 2023-08-04 ENCOUNTER — TELEPHONE (OUTPATIENT)
Dept: NEPHROLOGY | Facility: CLINIC | Age: 68
End: 2023-08-04

## 2023-08-04 DIAGNOSIS — N18.31 CHRONIC KIDNEY DISEASE, STAGE 3A (HCC): Primary | ICD-10-CM

## 2023-08-04 NOTE — TELEPHONE ENCOUNTER
Left voicemail for the patient relaying the message above. Requested patient call back with any questions, concerns, and to verify receipt of message.

## 2023-08-04 NOTE — TELEPHONE ENCOUNTER
----- Message from Ayanna Beyer PA-C sent at 8/3/2023  8:04 PM EDT -----  Labs reviewed. Please call patient and let her know that creatinine above baseline but trending down from 1 week ago. Just underwent cystoscopy with ureteral stent removal.  Repeat BMP in 1 week.

## 2023-08-04 NOTE — RESULT ENCOUNTER NOTE
Labs reviewed. Please call patient and let her know that creatinine above baseline but trending down from 1 week ago. Just underwent cystoscopy with ureteral stent removal.  Repeat BMP in 1 week.

## 2023-08-07 ENCOUNTER — OFFICE VISIT (OUTPATIENT)
Dept: HEMATOLOGY ONCOLOGY | Facility: CLINIC | Age: 68
End: 2023-08-07
Payer: COMMERCIAL

## 2023-08-07 VITALS
DIASTOLIC BLOOD PRESSURE: 64 MMHG | BODY MASS INDEX: 27.52 KG/M2 | RESPIRATION RATE: 16 BRPM | TEMPERATURE: 97.2 F | WEIGHT: 136.5 LBS | HEART RATE: 69 BPM | OXYGEN SATURATION: 97 % | HEIGHT: 59 IN | SYSTOLIC BLOOD PRESSURE: 104 MMHG

## 2023-08-07 DIAGNOSIS — N18.31 CHRONIC KIDNEY DISEASE, STAGE 3A (HCC): ICD-10-CM

## 2023-08-07 DIAGNOSIS — C54.1 ENDOMETRIAL CANCER (HCC): Chronic | ICD-10-CM

## 2023-08-07 DIAGNOSIS — D50.8 IRON DEFICIENCY ANEMIA SECONDARY TO INADEQUATE DIETARY IRON INTAKE: ICD-10-CM

## 2023-08-07 DIAGNOSIS — R79.0 ABNORMAL IRON SATURATION: ICD-10-CM

## 2023-08-07 DIAGNOSIS — D47.2 MGUS (MONOCLONAL GAMMOPATHY OF UNKNOWN SIGNIFICANCE): ICD-10-CM

## 2023-08-07 DIAGNOSIS — I82.4Y1 ACUTE DEEP VEIN THROMBOSIS (DVT) OF PROXIMAL VEIN OF RIGHT LOWER EXTREMITY (HCC): Primary | ICD-10-CM

## 2023-08-07 DIAGNOSIS — Z86.718 HISTORY OF DVT (DEEP VEIN THROMBOSIS): ICD-10-CM

## 2023-08-07 PROCEDURE — 99215 OFFICE O/P EST HI 40 MIN: CPT | Performed by: PHYSICIAN ASSISTANT

## 2023-08-07 NOTE — PROGRESS NOTES
Hematology/Oncology Outpatient Follow- up Note  Ragini Melendez 74 y.o. female MRN: @ Encounter: [id number]        Date:  8/7/2023      Assessment / Plan:    1. Chronic Anemia, multifactorial 2nd to iron deficiency, chronic kidney disease; chronic disease. Hgb 10.5 7/7/2019 with platelet count 410  (7/30/2019  C1D1 Taxol Carbo)  Hemoglobin 9 - 11g/dL range in 2019 when undergoing chemo/rt for endometrial ca. 10/2/2019 iron saturation 17%; ferritin 1000.    6/27/23 Hemoglobin 9.1 with MCV 97.        2.  Iron deficiency in a patient who is status post gastric bypass surgery in 2001. Chronic elevation of ferritin 400-1600 (secondary to inflammation). Iron saturation 14% to 39% since 2019 February through April 2023 -   Venofer 200 mg weekly x8 (Iron saturation 14% 1/2021 -> 39% 5/4/2023 -> 20% 6/27/23). Prior to this, she never received IV iron,  though evidenced by her CBC 7/2019 (anemia, thrombocytosis) she was likely iron deficient then. She is taking oral iron. Advised to d/c 2nd lack of absorption in background of iron deficiency. Dr. Vicki Kumar office reached out 2/2023 to schedule colonoscopy. Was scheduled for 5/24/23. Cancelled 2nd to inpatient hospitalization. Patient aware and will call to r/s in future. 3.  B12 deficiency. 5/2023 - 5/2023  normal MMA;  B12 level 821. She does take oral B12 daily. 4. History of Acute on chronic anemia. She received 1 unit packed red blood cells May 2023 during hospitalization when hemoglobin decreased to 6.9.      5. 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 Normal total protein, albumin, calcium. 6.  Endometrial Ca. Diagnosed initially 2017.    S/P total laparoscopic hysterectomy and bilateral salpingo-oophorectomy and bilateral pelvic lymph node dissection December 2017.  2019 -presented with right lower extremity DVT. CT identified right pelvic sidewall mass with venous, ureteral nerve compression  7/8/2019 LN biopsy- High-grade adenocarcinoma. She received Taxol carboplatin x6 doses July 2019 through January 2020. She received adjuvant external beam RT February 24 through April 20, 2020.  11/2020 -new retroperitoneal lymphadenopathy identified. 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 On surveillance with gynecologic oncology. 7.  Ureteral strictures, hydronephrosis  2nd to pelvic RT. She has had bilateral ureteral stents in place. Hospitalized x 2 5/2023 with [facility]. Percutaneous nephrostomy tubes placed 5/26 - 5/31/23 admission. She has had nephrostomy tubes placed multiple times over the years since 2019. Ureteral stents removed 8/1/23 by Dr. Дмитрий Villanueva. PCN tubes remain in place. Cr 2 - 2.1 7/2023 - 8/2023.      8 .History of RLE DVT dx in 2019. Induced 2nd to malignancy. Was on Lovenox. D/C’d 6/2023 per gyn/onc. 9.  L5 compression deformity on 11/2020 CT as well as incompletely healed fx superior pubic ramus. Sacral sclerosis which may be 2nd to radiation, insufficiency fractures. On 8/2020 pet/ctk, right medial pubic bone developing fracture noted. Dexa scan has been ordered, most recently 1/2023. Not yet performed. Orders Placed This Encounter   Procedures   • CBC and differential   • Vitamin B12   • IgG, IgA, IgM   • Kappa/Lambda Light Chains Free With Ratio, Serum   • Protein electrophoresis, serum       Patient will have repeat labs and we will go from there. HPI:  Kenya Hutchison is seen for initial consultation 2/8/2023 regarding iron deficiency. The patient has a history of endometrial cancer. S/P surgery 2017, chemo/RT for pelvic recurrence 2019; immunotherapy/ monoclonal antibody treatment on clinical trial [id number]. History of left lower extremity DVT in 2020 (time of recurrence of endometrial cancer). She was on anticoagulation through June 2023    History of gastric bypass surgery in 2001 by Dr. Dominique Abad. Follows with [facility]  History of hypertension, myofascial pain, osteoporosis, right pubis fracture, chronic kidney disease, hydronephrosis, depression, GERD, hyperlipidemia, overactive bladder. 10/2016 hemoglobin 12.3, MCV 86,  October 2017 hemoglobin 12.5, MCV 83    9/2019  EGD esophagus appeared normal, very small gastric pouch, normal gastrojejunal anastomosis  10/2022 no monoclonal bands on SPEP.  10/31/22 CHERI +1:80; homogenous pattern; normal double-stranded antibody  Nonreactive hepatitis B and C antibodies. HIV nonreactive    Venofer 200 mg weekly x 8 February through April 2023      May 2023 acute kidney injury secondary to hydronephrosis secondary to endometrial cancer requiring percutaneous nephrostomy tubes      5/2023  normal MMA;  B12 level 821    6/27/23 SPEP biclonal gammopathy  M1 IgG kappa  0.17 g/dL  M2 IgG lambda  0.36 g/dL  IgG 1700  Component      Latest Ref Rng 10/31/2022 5/4/2023   IMMUNOGLOBULIN KAPPA FREE LIGHT CHAIN      3.3 - 19.4 mg/L 55.3 (H)  248.5 (H)    IMMUNOGLOBULIN LAMBDA FREE LIGHT CHAIN      5.7 - 26.3 mg/L 36.0 (H)  137.3 (H)    KAPPA/LAMBDA FREE LIGHT CHAIN RATIO      0.26 - 1.65  1.54  1.81 (H)      Component      Latest Ref Rng 6/27/2023   IMMUNOGLOBULIN KAPPA FREE LIGHT CHAIN      3.3 - 19.4 mg/L 136.6 (H)    IMMUNOGLOBULIN LAMBDA FREE LIGHT CHAIN      5.7 - 26.3 mg/L 92.5 (H)    KAPPA/LAMBDA FREE LIGHT CHAIN RATIO      0.26 - 1.65  1.48           6/2023 normal hemoglobin electrophoresis      Interval History:    Here with her sister, Olive Mon.     August 1, 2023 she underwent cystoscopy with bilateral ureteral stent removal by Dr. Ornelas July    Tired. Unfortunately, she does not sleep well as she awakes every 2 hours to empty the nephrostomy tubes. Denies any melena, hematochezia, hematuria. She is taking oral iron            Review of Systems   Constitutional: Positive for fatigue. Negative for appetite change, chills, diaphoresis, fever and unexpected weight change. HENT:   Negative for mouth sores, nosebleeds, sore throat, tinnitus and voice change. Eyes: Negative for eye problems. Respiratory: Negative for chest tightness, cough, shortness of breath and wheezing. Cardiovascular: Negative for chest pain, leg swelling and palpitations. Gastrointestinal: Negative for abdominal distention, abdominal pain, blood in stool, constipation, diarrhea, nausea, rectal pain and vomiting. Endocrine: Negative for hot flashes. Genitourinary: Negative. Musculoskeletal: Negative for gait problem and myalgias. Skin: Negative for itching and rash. Neurological: Negative for dizziness, gait problem, headaches, light-headedness and numbness. Hematological: Negative for adenopathy. Psychiatric/Behavioral: Negative for confusion and sleep disturbance. The patient is not nervous/anxious.          Test Results:        Labs:   Lab Results   Component Value Date    HGB 9.1 (L) 06/27/2023    HCT 30.8 (L) 06/27/2023    MCV 97 06/27/2023     06/27/2023    WBC 9.23 06/27/2023    NRBC 0 06/27/2023    BANDSPCT 1 05/12/2023    ATYLMPCT 3 (H) 01/17/2020     Lab Results   Component Value Date     10/27/2017    K 4.0 08/02/2023     08/02/2023    CO2 24 08/02/2023    BUN 48 (H) 08/02/2023    CREATININE 1.98 (H) 08/02/2023    GLUCOSE 219 (H) 12/19/2017    GLUF 85 06/27/2023    CALCIUM 8.0 (L) 08/02/2023    CORRECTEDCA 9.8 07/27/2023    AST 9 (L) 06/27/2023    ALT 9 06/27/2023    ALKPHOS 100 06/27/2023    PROT 6.9 10/27/2017    BILITOT 0.3 10/27/2017    EGFR 25 08/02/2023 Imaging: Mammo screening bilateral w 3d & cad    Result Date: 7/31/2023  Narrative: DIAGNOSIS: Screening mammogram, encounter for TECHNIQUE: Digital screening mammography was performed. Computer Aided Detection (CAD) analyzed all applicable images. COMPARISONS: Prior breast imaging dated: 07/27/2022, 06/28/2019, 06/28/2019, 06/24/2019, and 06/20/2019 RELEVANT HISTORY: Family Breast Cancer History: History of breast cancer in Sister. Family Medical History: Family medical history includes breast cancer in sister, colon cancer in mother, and ovarian cancer in mother. Personal History: Hormone history includes estrogen replacement therapy. Surgical history includes breast biopsy, hysterectomy, and oophorectomy. Medical history includes endometrial cancer and history of chemotherapy. The patient is scheduled in a reminder system for screening mammography. 8-10% of cancers will be missed on mammography. Management of a palpable abnormality must be based on clinical grounds. Patients will be notified of their results via letter from our facility. Accredited by [employer] of [employer] and FDA. RISK ASSESSMENT: 5 Year Tyrer-Cuzick: 3.92 % 10 Year Tyrer-Cuzick: 6.91 % Lifetime Tyrer-Cuzick: 13.68 % TISSUE DENSITY: There are scattered areas of fibroglandular density. INDICATION: Gil Lai is a 74 y.o. female presenting for screening mammography. FINDINGS: There are no suspicious masses, grouped microcalcifications or areas of architectural distortion. A radiopaque post core biopsy clip is seen in the right breast. The skin and nipple areolar complex are unremarkable. Impression: No mammographic evidence of malignancy. No significant change ASSESSMENT/BI-RADS CATEGORY: Left: 1 - Negative Right: 1 - Negative Overall: 1 - Negative RECOMMENDATION:      - Routine screening mammogram in 1 year for both breasts. Workstation ID: [id number]             Allergies:    Allergies   Allergen Reactions   • Cephalosporins Rash     Which Cephalosporin reaction was to not specified; however, has tolerated Amoxicillin, Cefazolin, and Cefepime     Current Medications: Reviewed  PMH/FH/SH:  Reviewed      Physical Exam:    1.51 meters squared    Ht Readings from Last 3 Encounters:   08/07/23 4' 11" (1.499 m)   08/01/23 4' 11" (1.499 m)   07/11/23 4' 11" (1.499 m)        Wt Readings from Last 3 Encounters:   08/01/23 56.7 kg (125 lb)   07/11/23 54.6 kg (120 lb 6.4 oz)   06/16/23 50.8 kg (112 lb)        Temp Readings from Last 3 Encounters:   08/01/23 98.7 °F (37.1 °C) (Temporal)   07/06/23 97.9 °F (36.6 °C)   06/29/23 98.6 °F (37 °C) (Temporal)        BP Readings from Last 3 Encounters:   08/01/23 103/62   07/11/23 102/68   07/06/23 102/60             Physical Exam  Vitals reviewed. Constitutional:       General: She is not in acute distress. Appearance: She is well-developed. She is not diaphoretic. Comments: alopecia   HENT:      Head: Normocephalic and atraumatic. Eyes:      Conjunctiva/sclera: Conjunctivae normal.   Neck:      Trachea: No tracheal deviation. Cardiovascular:      Rate and Rhythm: Normal rate and regular rhythm. Heart sounds: No murmur heard. No friction rub. No gallop. Pulmonary:      Effort: Pulmonary effort is normal. No respiratory distress. Breath sounds: Normal breath sounds. No wheezing or rales. Chest:      Chest wall: No tenderness. Abdominal:      General: There is no distension. Palpations: Abdomen is soft. Tenderness: There is no abdominal tenderness. Musculoskeletal:      Cervical back: Normal range of motion and neck supple. Lymphadenopathy:      Cervical: No cervical adenopathy. Skin:     General: Skin is warm and dry. Coloration: Skin is not pale. Findings: No erythema. Neurological:      Mental Status: She is alert and oriented to person, place, and time.    Psychiatric:         Behavior: Behavior normal.         Thought Content: Thought content normal.         Judgment: Judgment normal.         ECOG:       Emergency Contacts:    Extended Emergency Contact Information  Primary Emergency Contact: [facility] [address] Phone: [phone]  Mobile Phone: [phone]  Relation: Sister  Secondary Emergency Contact: Fernando Melendez  Mobile Phone: [phone]  Relation: Brother

## 2023-08-08 ENCOUNTER — HOSPITAL ENCOUNTER (OUTPATIENT)
Dept: RADIOLOGY | Facility: HOSPITAL | Age: 68
Discharge: HOME | End: 2023-08-08
Attending: INTERNAL MEDICINE
Payer: COMMERCIAL

## 2023-08-08 VITALS
WEIGHT: 136 LBS | OXYGEN SATURATION: 99 % | DIASTOLIC BLOOD PRESSURE: 66 MMHG | BODY MASS INDEX: 27.42 KG/M2 | HEART RATE: 78 BPM | HEIGHT: 59 IN | TEMPERATURE: 97.2 F | SYSTOLIC BLOOD PRESSURE: 128 MMHG | RESPIRATION RATE: 18 BRPM

## 2023-08-08 DIAGNOSIS — N13.5 BILATERAL URETERAL OBSTRUCTION: ICD-10-CM

## 2023-08-08 LAB
ANION GAP SERPL CALCULATED.3IONS-SCNC: 9 MMOL/L
BUN SERPL-MCNC: 54 MG/DL (ref 5–25)
CALCIUM SERPL-MCNC: 8.7 MG/DL (ref 8.4–10.2)
CHLORIDE SERPL-SCNC: 109 MMOL/L (ref 96–108)
CO2 SERPL-SCNC: 22 MMOL/L (ref 21–32)
CREAT SERPL-MCNC: 1.83 MG/DL (ref 0.6–1.3)
ERYTHROCYTE [DISTWIDTH] IN BLOOD BY AUTOMATED COUNT: 13.3 % (ref 11.6–15.1)
GFR SERPL CREATININE-BSD FRML MDRD: 28 ML/MIN/1.73SQ M
GLUCOSE P FAST SERPL-MCNC: 82 MG/DL (ref 65–99)
GLUCOSE SERPL-MCNC: 82 MG/DL (ref 65–140)
HCT VFR BLD AUTO: 29.2 % (ref 34.8–46.1)
HGB BLD-MCNC: 8.8 G/DL (ref 11.5–15.4)
INR PPP: 0.99 (ref 0.84–1.19)
MCH RBC QN AUTO: 28.8 PG (ref 26.8–34.3)
MCHC RBC AUTO-ENTMCNC: 30.1 G/DL (ref 31.4–37.4)
MCV RBC AUTO: 95 FL (ref 82–98)
PLATELET # BLD AUTO: 289 THOUSANDS/UL (ref 149–390)
PMV BLD AUTO: 9.8 FL (ref 8.9–12.7)
POTASSIUM SERPL-SCNC: 4.4 MMOL/L (ref 3.5–5.3)
PROTHROMBIN TIME: 13.3 SECONDS (ref 11.6–14.5)
RBC # BLD AUTO: 3.06 MILLION/UL (ref 3.81–5.12)
SODIUM SERPL-SCNC: 140 MMOL/L (ref 135–147)
WBC # BLD AUTO: 6.85 THOUSAND/UL (ref 4.31–10.16)

## 2023-08-08 PROCEDURE — 50435 EXCHANGE NEPHROSTOMY CATH: CPT | Performed by: RADIOLOGY

## 2023-08-08 PROCEDURE — 85610 PROTHROMBIN TIME: CPT | Performed by: INTERNAL MEDICINE

## 2023-08-08 PROCEDURE — 85027 COMPLETE CBC AUTOMATED: CPT | Performed by: INTERNAL MEDICINE

## 2023-08-08 PROCEDURE — C1729 CATH, DRAINAGE: HCPCS

## 2023-08-08 PROCEDURE — C1769 GUIDE WIRE: HCPCS

## 2023-08-08 PROCEDURE — 80048 BASIC METABOLIC PNL TOTAL CA: CPT | Performed by: INTERNAL MEDICINE

## 2023-08-08 PROCEDURE — 50435 EXCHANGE NEPHROSTOMY CATH: CPT

## 2023-08-08 PROCEDURE — C1887 CATHETER, GUIDING: HCPCS

## 2023-08-08 PROCEDURE — 99152 MOD SED SAME PHYS/QHP 5/>YRS: CPT | Performed by: RADIOLOGY

## 2023-08-08 RX ORDER — SODIUM CHLORIDE 9 MG/ML
10 INJECTION INTRAVENOUS DAILY
Qty: 300 ML | Refills: 2 | Status: SHIPPED | OUTPATIENT
Start: 2023-08-08 | End: 2023-08-15

## 2023-08-08 RX ORDER — FENTANYL CITRATE 50 UG/ML
INJECTION, SOLUTION INTRAMUSCULAR; INTRAVENOUS AS NEEDED
Status: COMPLETED | OUTPATIENT
Start: 2023-08-08 | End: 2023-08-08

## 2023-08-08 RX ORDER — MIDAZOLAM HYDROCHLORIDE 2 MG/2ML
INJECTION, SOLUTION INTRAMUSCULAR; INTRAVENOUS AS NEEDED
Status: COMPLETED | OUTPATIENT
Start: 2023-08-08 | End: 2023-08-08

## 2023-08-08 RX ORDER — LIDOCAINE WITH 8.4% SOD BICARB 0.9%(10ML)
SYRINGE (ML) INJECTION AS NEEDED
Status: COMPLETED | OUTPATIENT
Start: 2023-08-08 | End: 2023-08-08

## 2023-08-08 RX ORDER — CEFAZOLIN SODIUM 1 G/50ML
1000 SOLUTION INTRAVENOUS ONCE
Status: COMPLETED | OUTPATIENT
Start: 2023-08-08 | End: 2023-08-08

## 2023-08-08 RX ADMIN — CEFAZOLIN SODIUM 1000 MG: 1 SOLUTION INTRAVENOUS at 09:10

## 2023-08-08 RX ADMIN — FENTANYL CITRATE 25 MCG: 50 INJECTION, SOLUTION INTRAMUSCULAR; INTRAVENOUS at 09:37

## 2023-08-08 RX ADMIN — Medication 10 ML: at 09:23

## 2023-08-08 RX ADMIN — FENTANYL CITRATE 25 MCG: 50 INJECTION, SOLUTION INTRAMUSCULAR; INTRAVENOUS at 09:14

## 2023-08-08 RX ADMIN — MIDAZOLAM HYDROCHLORIDE 0.5 MG: 1 INJECTION, SOLUTION INTRAMUSCULAR; INTRAVENOUS at 09:47

## 2023-08-08 RX ADMIN — FENTANYL CITRATE 25 MCG: 50 INJECTION, SOLUTION INTRAMUSCULAR; INTRAVENOUS at 10:08

## 2023-08-08 RX ADMIN — MIDAZOLAM HYDROCHLORIDE 0.5 MG: 1 INJECTION, SOLUTION INTRAMUSCULAR; INTRAVENOUS at 09:37

## 2023-08-08 RX ADMIN — FENTANYL CITRATE 25 MCG: 50 INJECTION, SOLUTION INTRAMUSCULAR; INTRAVENOUS at 09:47

## 2023-08-08 RX ADMIN — MIDAZOLAM HYDROCHLORIDE 1 MG: 1 INJECTION, SOLUTION INTRAMUSCULAR; INTRAVENOUS at 09:14

## 2023-08-08 RX ADMIN — MIDAZOLAM HYDROCHLORIDE 1 MG: 1 INJECTION, SOLUTION INTRAMUSCULAR; INTRAVENOUS at 10:11

## 2023-08-08 NOTE — DISCHARGE INSTRUCTIONS
Nephrostomy Tube Care     WHAT YOU NEED TO KNOW:   A nephrostomy tube is a catheter (thin plastic tube) that is inserted through your skin and into your kidney. The nephrostomy tube drains urine from your kidney into a collecting bag outside your body. You may need a nephrostomy tube when something is blocking the normal flow of urine. A nephrostomy tube may be used for a short or a long period of time. The nephrostomy tube comes out of your back, so you will need someone to help care for your nephrostomy tube. DISCHARGE INSTRUCTIONS:      How to clean the skin around the nephrostomy tube and change the bandage:  Since the nephrostomy tube comes out of your back, you will not be able to care for it by yourself. Ask someone to follow the general directions below to check and care for your nephrostomy tube. Gather the items you will need. Disposable (single use) under-pad, and a clean washcloth  Plain soap, warm water, and new medical gloves  Sterile gauze bandages  Clear adhesive dressing or medical tape  Skin barrier  Protective skin film  Trash bag  Remove the old bandage, and check the tube entry site. Have the patient lie on his side with the nephrostomy tube entry site facing up. Place the under-pad where it will catch drainage as you are working with the nephrostomy tube. Wash your hands with soap and water. Put on new medical gloves. Gently remove the old bandage, without pulling on the tube. Do this by holding the skin beside the tube with one hand. With the other hand, gently remove sticky tape and the skin barrier by pulling in the same direction as hair growth. Do not touch the side of the bandage that is placed over or around the tube. Throw the bandage and skin barrier away in a trash bag. Look for signs of infection, such as skin redness and swelling. Report any skin changes to healthcare providers. Clean the tube entry site.     Hold the tube in place to keep it from being pulled out while you are cleaning around it. You will need to clean the area twice. For the first cleaning, wet a new gauze bandage with soap and water. Begin at the entry site of the tube. Wipe the skin in circles, moving away from the entry site. Remove blood and any other material with the gauze. Do this as often as needed. Use a new gauze bandage each time you clean the area, moving away from the entry site. For the second cleaning, wet a new gauze bandage with water. Begin at the entry site of the tube. Wipe the skin in circles, moving away from the entry site. Use a new gauze bandage each time you clean the area, moving away from the entry site. Gently pat the skin with a clean washcloth to dry it. Apply the skin barrier and bandages. Roll up a bandage to make it thick, and place it under  the place where the tube enters the skin. Place it to support the tube, and stop it from kinking or bending. Tape the bandage in place, and apply more bandages if directed by a healthcare provider. Bring the tubing forward to the front and tape it to the skin. Do not stretch the tube tight, because this may pull the nephrostomy tube out. How often to change the bandage. Change the bandage around the tube, every other day. If your bandages  get dirty or wet, change them right away, and as often as needed. If your nephrostomy tube is to be used for a long period of time, the tube needs to be changed every 2 to 3 months. Healthcare providers will tell you when you need to make an appointment to have your tube changed. How to care for the urine drainage bag:   Ask if you need to measure and write down how much urine is in the bag before you empty it. Drain urine out of the drainage bag when it is ½ to ? full. Open the spout at the bottom of the bag to empty the urine into the toilet. You may need to detach the drainage bag from the nephrostomy tube to change it. . If so, attach a new drainage bag tightly to the nephrostomy tube. How to prevent problems with your nephrostomy tube:   Change bandages, directed. This helps to prevent infection. Throw away or clean your drainage bag as directed by your healthcare provider. Wipe the connecting ends of the drainage bag with alcohol before you reconnect the bag to the tube. This helps prevent infection. Keep the tube taped to your skin and connected to a drainage bag placed below the level of your kidneys. This helps prevent urine from backing up into your kidneys. You may wear a small drainage bag strapped to your leg to let you move around more easily. Check the catheter to be sure it is in place after you change your clothes or do other activities. Do not wear tight clothing over the tube. Place the tubing over your thigh rather than under it when you are sitting down. Be sure that nothing is pulling on the nephrostomy tube when you move around. Change positions if you see little or no urine in your drainage bag. Check to see if the urine tube is twisted or bent. Be sure that you are not sitting or lying on the tube. If there are no kinks and there is little or no urine in the drainage bag, tell your healthcare provider. Flush out the tube as directed. Some tubes get flushed one time a day with 10 mls of NSS You will be given a prescription for the flushes. To flush the nephrostomy tube, clean both connections with alcohol swap. Twist off the drainage bag tube and twist the saline syringe into the nephrostomy tube and flush briskly. Remove the syringe and twist the drainage bag tube back into the nephrostomy tube. Keep the site covered while you shower. Tape a piece of clear adhesive plastic over the dressing to keep it dry while you shower. Do not take tub baths.     Contact Interventional Radiology at [phone] Angelique PATIENTS: Contact Interventional Radiology at [phone]) Ki Reeves PATIENTS: Contact Interventional Radiology at [phone]) if:  The skin around the nephrostomy tube is red, swollen, itches, or has a rash. You have a fever greater than 101 or chills. You have lower back or hip pain. There are changes in how your urine looks or smells. You have little or no urine draining from the nephrostomy tube. You have nausea and are vomiting. The black bernardo on your tube has moved, or the tube is longer than when it was put in. You have questions or concerns about your condition or care. The nephrostomy tube comes out completely. There is blood, pus, or a bad smell coming from the place where the tube enters your skin. Urine is leaking around the tube. The following pharmacies carry the flush syringes. [facility]                     [facility]                    [address] [place] [address]  Phone [phone]            Phone [phone]  [address] [place] [address]. [address] [place] [address] [address]                                 [phone]  Phone [phone]            Phone [phone]    [facility]                                                                         [facility] [phone]  [address] [place] [address].   [facility]   Phone [phone]

## 2023-08-08 NOTE — BRIEF OP NOTE (RAD/CATH)
INTERVENTIONAL RADIOLOGY PROCEDURE NOTE    Date: 8/8/2023    Procedure:   Procedure Summary     Date: 08/08/23 Room / Location: [address] [place] [address] Interventional Radiology    Anesthesia Start:  Anesthesia Stop:     Procedure: IR NEPHROSTOMY TO NEPHROURETERAL STENT Diagnosis:       Bilateral ureteral obstruction      (Ureteral obstruction)    Scheduled Providers:  Responsible Provider:     Anesthesia Type: Not recorded ASA Status: Not recorded          Preoperative diagnosis:   1. Bilateral ureteral obstruction         Postoperative diagnosis: Same. Surgeon: Wade Jones MD     Assistant: None. No qualified resident was available. Blood loss: Minimal    Specimens: None     Findings: Bilateral nephrostomy tube exchange. The patient had 10 F drains placed and is here for routine exchange. He collecting systems is small and not dilated, placement of a regular 10 F tube was difficult due to catheter folding over on itself and the decision to place a 10 F DM drain in the left was made. Placement of a 8 F DM on the right was also planned, however no DM drain was available. Attempt to place a regular 10 F drain was attempted however again due to small collecting system, the pigtail would not form appropriately. A 10 F drain with a smaller pigtail was placed into a dilated calyx, which is not ideal, but due to clot and small collecting system further manipulation would likely result in further bleeding and unlikely for catheter to be repositioned in a better location. It communicate with the collecting system and should drain. If the patient has pain on the right due to pigtail in a calyx she may need to return sooner then 3 months for an exchange and repositioning once a DM is available for placement. Complications: None immediate.     Anesthesia: conscious sedation

## 2023-08-11 ENCOUNTER — TELEPHONE (OUTPATIENT)
Dept: NEPHROLOGY | Facility: CLINIC | Age: 68
End: 2023-08-11

## 2023-08-11 ENCOUNTER — LAB (OUTPATIENT)
Dept: LAB | Facility: CLINIC | Age: 68
End: 2023-08-11
Payer: COMMERCIAL

## 2023-08-11 DIAGNOSIS — R79.0 ABNORMAL IRON SATURATION: ICD-10-CM

## 2023-08-11 DIAGNOSIS — N18.31 CHRONIC KIDNEY DISEASE, STAGE 3A (HCC): ICD-10-CM

## 2023-08-11 DIAGNOSIS — D47.2 MGUS (MONOCLONAL GAMMOPATHY OF UNKNOWN SIGNIFICANCE): ICD-10-CM

## 2023-08-11 LAB
ANION GAP SERPL CALCULATED.3IONS-SCNC: 8 MMOL/L
BUN SERPL-MCNC: 53 MG/DL (ref 5–25)
CALCIUM SERPL-MCNC: 8.9 MG/DL (ref 8.4–10.2)
CHLORIDE SERPL-SCNC: 108 MMOL/L (ref 96–108)
CO2 SERPL-SCNC: 22 MMOL/L (ref 21–32)
CREAT SERPL-MCNC: 1.98 MG/DL (ref 0.6–1.3)
FERRITIN SERPL-MCNC: 567 NG/ML (ref 11–307)
GFR SERPL CREATININE-BSD FRML MDRD: 25 ML/MIN/1.73SQ M
GLUCOSE SERPL-MCNC: 84 MG/DL (ref 65–140)
IGA SERPL-MCNC: 318 MG/DL (ref 70–400)
IGG SERPL-MCNC: 1750 MG/DL (ref 700–1600)
IGM SERPL-MCNC: 72 MG/DL (ref 40–230)
IRON SATN MFR SERPL: 40 % (ref 15–50)
IRON SERPL-MCNC: 91 UG/DL (ref 50–170)
POTASSIUM SERPL-SCNC: 4.4 MMOL/L (ref 3.5–5.3)
SODIUM SERPL-SCNC: 138 MMOL/L (ref 135–147)
TIBC SERPL-MCNC: 225 UG/DL (ref 250–450)
VIT B12 SERPL-MCNC: 247 PG/ML (ref 180–914)

## 2023-08-11 PROCEDURE — 84165 PROTEIN E-PHORESIS SERUM: CPT

## 2023-08-11 PROCEDURE — 80048 BASIC METABOLIC PNL TOTAL CA: CPT

## 2023-08-11 PROCEDURE — 36415 COLL VENOUS BLD VENIPUNCTURE: CPT

## 2023-08-11 PROCEDURE — 83521 IG LIGHT CHAINS FREE EACH: CPT

## 2023-08-11 PROCEDURE — 82784 ASSAY IGA/IGD/IGG/IGM EACH: CPT

## 2023-08-11 PROCEDURE — 83540 ASSAY OF IRON: CPT

## 2023-08-11 PROCEDURE — 83550 IRON BINDING TEST: CPT

## 2023-08-11 PROCEDURE — 82728 ASSAY OF FERRITIN: CPT

## 2023-08-11 PROCEDURE — 82607 VITAMIN B-12: CPT

## 2023-08-11 NOTE — TELEPHONE ENCOUNTER
----- Message from Kerry Ramos PA-C sent at 8/11/2023  2:08 PM EDT -----  Reviewed. Please call pt and let her know labs are stable.   Results to reviewed at f/u

## 2023-08-14 ENCOUNTER — TELEPHONE (OUTPATIENT)
Dept: HEMATOLOGY ONCOLOGY | Facility: CLINIC | Age: 68
End: 2023-08-14

## 2023-08-14 ENCOUNTER — APPOINTMENT (EMERGENCY)
Dept: VASCULAR ULTRASOUND | Facility: HOSPITAL | Age: 68
End: 2023-08-14
Payer: COMMERCIAL

## 2023-08-14 ENCOUNTER — TELEPHONE (OUTPATIENT)
Dept: FAMILY MEDICINE CLINIC | Facility: CLINIC | Age: 68
End: 2023-08-14

## 2023-08-14 ENCOUNTER — HOSPITAL ENCOUNTER (OUTPATIENT)
Facility: HOSPITAL | Age: 68
Setting detail: OBSERVATION
Discharge: HOME | End: 2023-08-15
Attending: EMERGENCY MEDICINE | Admitting: HOSPITALIST
Payer: COMMERCIAL

## 2023-08-14 ENCOUNTER — APPOINTMENT (EMERGENCY)
Dept: RADIOLOGY | Facility: HOSPITAL | Age: 68
End: 2023-08-14
Payer: COMMERCIAL

## 2023-08-14 DIAGNOSIS — Z86.718 HISTORY OF DVT (DEEP VEIN THROMBOSIS): ICD-10-CM

## 2023-08-14 DIAGNOSIS — R60.0 BILATERAL LOWER EXTREMITY EDEMA: Primary | ICD-10-CM

## 2023-08-14 DIAGNOSIS — R78.81 MSSA BACTEREMIA: ICD-10-CM

## 2023-08-14 DIAGNOSIS — R07.9 CHEST PAIN: ICD-10-CM

## 2023-08-14 DIAGNOSIS — D47.2 MONOCLONAL GAMMOPATHY: Primary | ICD-10-CM

## 2023-08-14 DIAGNOSIS — B95.61 MSSA BACTEREMIA: ICD-10-CM

## 2023-08-14 DIAGNOSIS — R79.89 ELEVATED SERUM CREATININE: ICD-10-CM

## 2023-08-14 DIAGNOSIS — R79.89 ELEVATED D-DIMER: ICD-10-CM

## 2023-08-14 DIAGNOSIS — D50.8 IRON DEFICIENCY ANEMIA SECONDARY TO INADEQUATE DIETARY IRON INTAKE: ICD-10-CM

## 2023-08-14 PROBLEM — R07.89 LEFT-SIDED CHEST WALL PAIN: Status: ACTIVE | Noted: 2023-08-14

## 2023-08-14 LAB
2HR DELTA HS TROPONIN: 0 NG/L
ALBUMIN SERPL BCP-MCNC: 3.7 G/DL (ref 3.5–5)
ALP SERPL-CCNC: 133 U/L (ref 34–104)
ALT SERPL W P-5'-P-CCNC: 14 U/L (ref 7–52)
ANION GAP SERPL CALCULATED.3IONS-SCNC: 7 MMOL/L
APTT PPP: 32 SECONDS (ref 23–37)
AST SERPL W P-5'-P-CCNC: 15 U/L (ref 13–39)
BASOPHILS # BLD AUTO: 0.05 THOUSANDS/ÂΜL (ref 0–0.1)
BASOPHILS NFR BLD AUTO: 1 % (ref 0–1)
BILIRUB SERPL-MCNC: 0.28 MG/DL (ref 0.2–1)
BNP SERPL-MCNC: 29 PG/ML (ref 0–100)
BUN SERPL-MCNC: 61 MG/DL (ref 5–25)
CALCIUM SERPL-MCNC: 8.9 MG/DL (ref 8.4–10.2)
CARDIAC TROPONIN I PNL SERPL HS: 3 NG/L
CHLORIDE SERPL-SCNC: 108 MMOL/L (ref 96–108)
CO2 SERPL-SCNC: 23 MMOL/L (ref 21–32)
CREAT SERPL-MCNC: 2.12 MG/DL (ref 0.6–1.3)
D DIMER PPP FEU-MCNC: 1.65 UG/ML FEU
EOSINOPHIL # BLD AUTO: 0.08 THOUSAND/ÂΜL (ref 0–0.61)
EOSINOPHIL NFR BLD AUTO: 1 % (ref 0–6)
ERYTHROCYTE [DISTWIDTH] IN BLOOD BY AUTOMATED COUNT: 13.2 % (ref 11.6–15.1)
GFR SERPL CREATININE-BSD FRML MDRD: 23 ML/MIN/1.73SQ M
GLUCOSE SERPL-MCNC: 68 MG/DL (ref 65–140)
HCT VFR BLD AUTO: 29 % (ref 34.8–46.1)
HGB BLD-MCNC: 8.9 G/DL (ref 11.5–15.4)
IMM GRANULOCYTES # BLD AUTO: 0.08 THOUSAND/UL (ref 0–0.2)
IMM GRANULOCYTES NFR BLD AUTO: 1 % (ref 0–2)
INR PPP: 1 (ref 0.84–1.19)
KAPPA LC FREE SER-MCNC: 138.9 MG/L (ref 3.3–19.4)
KAPPA LC FREE/LAMBDA FREE SER: 1.74 {RATIO} (ref 0.26–1.65)
LAMBDA LC FREE SERPL-MCNC: 79.8 MG/L (ref 5.7–26.3)
LYMPHOCYTES # BLD AUTO: 0.82 THOUSANDS/ÂΜL (ref 0.6–4.47)
LYMPHOCYTES NFR BLD AUTO: 11 % (ref 14–44)
MAGNESIUM SERPL-MCNC: 2.2 MG/DL (ref 1.9–2.7)
MCH RBC QN AUTO: 29.5 PG (ref 26.8–34.3)
MCHC RBC AUTO-ENTMCNC: 30.7 G/DL (ref 31.4–37.4)
MCV RBC AUTO: 96 FL (ref 82–98)
MONOCYTES # BLD AUTO: 0.77 THOUSAND/ÂΜL (ref 0.17–1.22)
MONOCYTES NFR BLD AUTO: 10 % (ref 4–12)
NEUTROPHILS # BLD AUTO: 5.81 THOUSANDS/ÂΜL (ref 1.85–7.62)
NEUTS SEG NFR BLD AUTO: 76 % (ref 43–75)
NRBC BLD AUTO-RTO: 0 /100 WBCS
PLATELET # BLD AUTO: 261 THOUSANDS/UL (ref 149–390)
PLATELET # BLD AUTO: 285 THOUSANDS/UL (ref 149–390)
PMV BLD AUTO: 9.6 FL (ref 8.9–12.7)
PMV BLD AUTO: 9.7 FL (ref 8.9–12.7)
POTASSIUM SERPL-SCNC: 4.2 MMOL/L (ref 3.5–5.3)
PROT SERPL-MCNC: 7.6 G/DL (ref 6.4–8.4)
PROTHROMBIN TIME: 13.8 SECONDS (ref 11.6–14.5)
RBC # BLD AUTO: 3.02 MILLION/UL (ref 3.81–5.12)
SODIUM SERPL-SCNC: 138 MMOL/L (ref 135–147)
TSH SERPL DL<=0.05 MIU/L-ACNC: 2.05 UIU/ML (ref 0.45–4.5)
WBC # BLD AUTO: 7.61 THOUSAND/UL (ref 4.31–10.16)

## 2023-08-14 PROCEDURE — 93005 ELECTROCARDIOGRAM TRACING: CPT

## 2023-08-14 PROCEDURE — 85730 THROMBOPLASTIN TIME PARTIAL: CPT | Performed by: PHYSICIAN ASSISTANT

## 2023-08-14 PROCEDURE — 85049 AUTOMATED PLATELET COUNT: CPT | Performed by: PHYSICIAN ASSISTANT

## 2023-08-14 PROCEDURE — 84484 ASSAY OF TROPONIN QUANT: CPT | Performed by: PHYSICIAN ASSISTANT

## 2023-08-14 PROCEDURE — 71045 X-RAY EXAM CHEST 1 VIEW: CPT

## 2023-08-14 PROCEDURE — 85610 PROTHROMBIN TIME: CPT | Performed by: PHYSICIAN ASSISTANT

## 2023-08-14 PROCEDURE — 80053 COMPREHEN METABOLIC PANEL: CPT | Performed by: PHYSICIAN ASSISTANT

## 2023-08-14 PROCEDURE — 36415 COLL VENOUS BLD VENIPUNCTURE: CPT | Performed by: PHYSICIAN ASSISTANT

## 2023-08-14 PROCEDURE — 85025 COMPLETE CBC W/AUTO DIFF WBC: CPT | Performed by: PHYSICIAN ASSISTANT

## 2023-08-14 PROCEDURE — 83880 ASSAY OF NATRIURETIC PEPTIDE: CPT | Performed by: PHYSICIAN ASSISTANT

## 2023-08-14 PROCEDURE — 93970 EXTREMITY STUDY: CPT

## 2023-08-14 PROCEDURE — 99285 EMERGENCY DEPT VISIT HI MDM: CPT | Performed by: PHYSICIAN ASSISTANT

## 2023-08-14 PROCEDURE — 85379 FIBRIN DEGRADATION QUANT: CPT | Performed by: PHYSICIAN ASSISTANT

## 2023-08-14 PROCEDURE — 84443 ASSAY THYROID STIM HORMONE: CPT | Performed by: PHYSICIAN ASSISTANT

## 2023-08-14 PROCEDURE — 99285 EMERGENCY DEPT VISIT HI MDM: CPT

## 2023-08-14 PROCEDURE — 99223 1ST HOSP IP/OBS HIGH 75: CPT | Performed by: HOSPITALIST

## 2023-08-14 PROCEDURE — 83735 ASSAY OF MAGNESIUM: CPT | Performed by: PHYSICIAN ASSISTANT

## 2023-08-14 RX ORDER — SODIUM BICARBONATE 650 MG/1
650 TABLET ORAL 3 TIMES DAILY
Status: DISCONTINUED | OUTPATIENT
Start: 2023-08-14 | End: 2023-08-15 | Stop reason: HOSPADM

## 2023-08-14 RX ORDER — FUROSEMIDE 10 MG/ML
20 INJECTION INTRAMUSCULAR; INTRAVENOUS ONCE
Status: COMPLETED | OUTPATIENT
Start: 2023-08-14 | End: 2023-08-14

## 2023-08-14 RX ORDER — ARIPIPRAZOLE 5 MG/1
20 TABLET ORAL EVERY MORNING
Status: DISCONTINUED | OUTPATIENT
Start: 2023-08-15 | End: 2023-08-15 | Stop reason: HOSPADM

## 2023-08-14 RX ORDER — DRONABINOL 2.5 MG/1
2.5 CAPSULE ORAL
Status: DISCONTINUED | OUTPATIENT
Start: 2023-08-14 | End: 2023-08-15 | Stop reason: HOSPADM

## 2023-08-14 RX ORDER — HEPARIN SODIUM 5000 [USP'U]/ML
5000 INJECTION, SOLUTION INTRAVENOUS; SUBCUTANEOUS EVERY 8 HOURS SCHEDULED
Status: DISCONTINUED | OUTPATIENT
Start: 2023-08-14 | End: 2023-08-15 | Stop reason: HOSPADM

## 2023-08-14 RX ORDER — SENNOSIDES 8.6 MG
1 TABLET ORAL 2 TIMES DAILY PRN
Status: DISCONTINUED | OUTPATIENT
Start: 2023-08-14 | End: 2023-08-15 | Stop reason: HOSPADM

## 2023-08-14 RX ORDER — PANTOPRAZOLE SODIUM 40 MG/1
40 TABLET, DELAYED RELEASE ORAL
Status: DISCONTINUED | OUTPATIENT
Start: 2023-08-15 | End: 2023-08-15 | Stop reason: HOSPADM

## 2023-08-14 RX ORDER — OXYBUTYNIN CHLORIDE 5 MG/1
15 TABLET, EXTENDED RELEASE ORAL
Status: DISCONTINUED | OUTPATIENT
Start: 2023-08-14 | End: 2023-08-15 | Stop reason: HOSPADM

## 2023-08-14 RX ORDER — SODIUM CHLORIDE 9 MG/ML
10 INJECTION INTRAVENOUS DAILY
Status: DISCONTINUED | OUTPATIENT
Start: 2023-08-14 | End: 2023-08-15 | Stop reason: HOSPADM

## 2023-08-14 RX ORDER — SACCHAROMYCES BOULARDII 250 MG
250 CAPSULE ORAL
Status: DISCONTINUED | OUTPATIENT
Start: 2023-08-14 | End: 2023-08-15 | Stop reason: HOSPADM

## 2023-08-14 RX ORDER — ONDANSETRON 2 MG/ML
4 INJECTION INTRAMUSCULAR; INTRAVENOUS EVERY 8 HOURS PRN
Status: DISCONTINUED | OUTPATIENT
Start: 2023-08-14 | End: 2023-08-15 | Stop reason: HOSPADM

## 2023-08-14 RX ORDER — FOLIC ACID 1 MG/1
1 TABLET ORAL
Status: DISCONTINUED | OUTPATIENT
Start: 2023-08-14 | End: 2023-08-15 | Stop reason: HOSPADM

## 2023-08-14 RX ORDER — VENLAFAXINE 37.5 MG/1
75 TABLET ORAL EVERY MORNING
Status: DISCONTINUED | OUTPATIENT
Start: 2023-08-15 | End: 2023-08-15 | Stop reason: HOSPADM

## 2023-08-14 RX ORDER — MELATONIN
4000
Status: DISCONTINUED | OUTPATIENT
Start: 2023-08-14 | End: 2023-08-15 | Stop reason: HOSPADM

## 2023-08-14 RX ADMIN — CYANOCOBALAMIN TAB 500 MCG 500 MCG: 500 TAB at 18:45

## 2023-08-14 RX ADMIN — SODIUM BICARBONATE 650 MG: 650 TABLET ORAL at 21:40

## 2023-08-14 RX ADMIN — FUROSEMIDE 20 MG: 10 INJECTION, SOLUTION INTRAMUSCULAR; INTRAVENOUS at 18:45

## 2023-08-14 RX ADMIN — Medication 250 MG: at 18:44

## 2023-08-14 RX ADMIN — HEPARIN SODIUM 5000 UNITS: 5000 INJECTION INTRAVENOUS; SUBCUTANEOUS at 18:45

## 2023-08-14 RX ADMIN — Medication 4000 UNITS: at 21:40

## 2023-08-14 RX ADMIN — FOLIC ACID 1 MG: 1 TABLET ORAL at 21:40

## 2023-08-14 RX ADMIN — OXYBUTYNIN CHLORIDE 15 MG: 5 TABLET, EXTENDED RELEASE ORAL at 21:40

## 2023-08-14 RX ADMIN — SODIUM CHLORIDE 10 ML: 9 INJECTION, SOLUTION INTRAMUSCULAR; INTRAVENOUS; SUBCUTANEOUS at 22:06

## 2023-08-14 RX ADMIN — SODIUM BICARBONATE 650 MG: 650 TABLET ORAL at 18:45

## 2023-08-14 NOTE — TELEPHONE ENCOUNTER
Pt called and stated that bot ankles and left leg are very swollen. She has a history of DVT's I advised she go to the ER to be evaluated and get STAT testing done.

## 2023-08-14 NOTE — ASSESSMENT & PLAN NOTE
· Known history of anemia being followed by hematology--iron infusions through their office as needed  · Continue B12 and folate.   · No reports of active bleeding or concern for sudden drop in hemoglobin

## 2023-08-14 NOTE — ED PROVIDER NOTES
History  Chief Complaint   Patient presents with   • Foot Swelling     Pt started with b/l feet swelling on Saturday and it has been worsening since no pain  -sob-cp      Patient is a 43-year-old female with a PMHx of anemia, bilateral nephrostomy tubes, CKD 3, DVT, endometrial cancer, GERD, HLD and HTN, presenting to the ED for evaluation of bilateral lower extremity edema x3 days. Patient states that she noticed mild swelling of both of her feet and ankles on Saturday. She states that the swelling has been progressively worsening since that time. She reports associated "tightness" and discomfort in lower legs and feet but denies any specific pain. She denies any trauma or injuries to the lower extremities. She denies any overlying rash, erythema or warmth. She denies any prolonged periods of standing, changes in medications or changes in her diet. She has never experienced similar symptoms in the past.  She denies a history of CHF. She does have a history of DVT but is no longer on anticoagulation. She also states that she developed a sharp, nonreproducible pain under her left breast a few days ago that is mildly improved today. She denies any exertional or pleuritic chest pain. She denies any shortness of breath, orthopnea, palpitations or dizziness. Prior to Admission Medications   Prescriptions Last Dose Informant Patient Reported? Taking?    ARIPiprazole (ABILIFY) 20 MG tablet 8/14/2023 Self Yes Yes   Sig: Take 20 mg by mouth every morning   Calcium-Magnesium-Vitamin D (CALCIUM 500 PO) 8/14/2023 Self Yes Yes   Sig: Take 1 capsule by mouth daily at bedtime   Cyanocobalamin (VITAMIN B12 PO) 8/14/2023 Self Yes Yes   Sig: Take 1 capsule by mouth daily after lunch   Gemtesa 75 MG TABS 8/14/2023 Self No Yes   Sig: TAKE 75 MG BY MOUTH IN THE MORNING   Patient taking differently: Take 1 capsule by mouth every morning   Multiple Vitamin (MULTIVITAMIN) tablet 8/14/2023 Self Yes Yes   Sig: Take 1 tablet by mouth every morning   aspirin (ECOTRIN LOW STRENGTH) 81 mg EC tablet Not Taking Self Yes No   Sig: Take 81 mg by mouth daily after lunch   Patient not taking: Reported on 8/14/2023   cholecalciferol (VITAMIN D3) 1,000 units tablet 8/14/2023 Self No Yes   Sig: Take 4 tablets (4,000 Units total) by mouth daily   Patient taking differently: Take 4,000 Units by mouth daily at bedtime   dronabinol (MARINOL) 2.5 mg capsule  Self No No   Sig: Take 1 capsule (2.5 mg total) by mouth 2 (two) times a day before meals   folic acid (FOLVITE) 1 mg tablet 8/13/2023 Self No Yes   Sig: Take 1 tablet (1 mg total) by mouth daily   Patient taking differently: Take 1 mg by mouth daily at bedtime   omeprazole (PriLOSEC) 20 mg delayed release capsule 8/14/2023 Self Yes Yes   Sig: Take 20 mg by mouth every morning   oxybutynin (DITROPAN XL) 15 MG 24 hr tablet 8/13/2023 Self No Yes   Sig: Take 1 tablet (15 mg total) by mouth daily at bedtime   saccharomyces boulardii (FLORASTOR) 250 mg capsule 8/14/2023 Self No Yes   Sig: Take 1 capsule (250 mg total) by mouth 2 (two) times a day   Patient taking differently: Take 250 mg by mouth daily after lunch   senna (SENOKOT) 8.6 MG tablet Past Month Self No Yes   Sig: Take 1 tablet (8.6 mg total) by mouth 2 (two) times a day as needed for constipation   sodium bicarbonate 650 mg tablet  Self Yes No   Sig: Take 650 mg by mouth 3 (three) times a day   sodium chloride, PF, 0.9 %  Self No No   Sig: 10 mL by Intracatheter route daily Intracatheter flushing daily. May substitute prefilled syringe with normal saline 10 mL vials, 10 mL syringes, and 18 g blunt needles   sodium chloride, PF, 0.9 %   No No   Sig: 10 mL by Intracatheter route daily Intracatheter flushing daily.  May substitute prefilled syringe with normal saline 10 mL vials, 10 mL syringes, and 18 g blunt needles   venlafaxine (EFFEXOR) 75 mg tablet 8/14/2023 Self Yes Yes   Sig: Take 75 mg by mouth every morning      Facility-Administered Medications: None       Past Medical History:   Diagnosis Date   • Anemia    • Anxiety    • Cancer ([address])     ENDOMETRIAL   • Chemotherapy induced neutropenia ([address]) 8/30/2019   • CKD (chronic kidney disease) stage 3, GFR 30-59 ml/min ([facility])    • COVID     Fall 2022   • Depression    • DVT (deep venous thrombosis) ([address]) 07/19/2019    RIGHT LEG   • Endometrial cancer ([address]) 12/2017   • GERD (gastroesophageal reflux disease)    • H/O gastric bypass    • Hard to intubate     pt denies AS OF 7/25/23   • History of chemotherapy     started 12/2021endometrial cancer- done 12/23/22   • History of DVT in adulthood     RLE   • History of transfusion 04/13/2023   • Hyperglycemia     vx type 2 dm -- last assessed 4/1/14; resolved 11/7/17   • Hyperlipidemia    • Hypertension     in past- no meds at present   • Iron deficiency anemia     iron infusions weekly   • OAB (overactive bladder)    • Port-A-Cath in place    • Wears glasses        Past Surgical History:   Procedure Laterality Date   • ABDOMINAL SURGERY      GASTRIC BYPASS; 2001   • BREAST BIOPSY Right 06/28/2019    core biopsy; benign   • CHOLECYSTECTOMY      at the time of gastric bypass   • COLONOSCOPY     • CT NEEDLE BIOPSY LYMPH NODE  07/08/2019   • FL GUIDED CENTRAL VENOUS ACCESS DEVICE INSERTION  11/12/2019   • FL RETROGRADE PYELOGRAM  03/30/2023   • FL RETROGRADE PYELOGRAM  05/09/2023   • FL RETROGRADE PYELOGRAM  8/1/2023   • GASTRIC BYPASS     • HYSTERECTOMY Bilateral 2017    total abdominal with salpingo-oophorectomy   • IR NEPHROSTOMY TO NEPHROURETERAL STENT  06/09/2022   • IR NEPHROSTOMY TO NEPHROURETERAL STENT  12/20/2022   • IR NEPHROSTOMY TO NEPHROURETERAL STENT  8/8/2023   • IR NEPHROSTOMY TUBE CHECK/CHANGE/REPOSITION/REINSERTION/UPSIZE  05/27/2022   • IR NEPHROSTOMY TUBE PLACEMENT  07/26/2019   • IR NEPHROSTOMY TUBE PLACEMENT  05/27/2023   • IR NEPHROURETERAL STENT CHECK/CHANGE/REPOSITION  04/06/2021   • IR NEPHROURETERAL STENT CHECK/CHANGE/REPOSITION 06/04/2021   • IR NEPHROURETERAL STENT CHECK/CHANGE/REPOSITION  09/03/2021   • IR NEPHROURETERAL STENT CHECK/CHANGE/REPOSITION  12/07/2021   • IR NEPHROURETERAL STENT CHECK/CHANGE/REPOSITION  03/08/2022   • IR NEPHROURETERAL STENT CHECK/CHANGE/REPOSITION  05/10/2022   • IR NEPHROURETERAL STENT CHECK/CHANGE/REPOSITION  09/19/2022   • IR PICC LINE  09/27/2019   • IR PORT PLACEMENT  07/26/2019   • IR PORT PLACEMENT     • IR PORT REMOVAL  09/20/2019   • OOPHORECTOMY Bilateral 2017   • NV CYSTO BLADDER W/URETERAL CATHETERIZATION Right 03/30/2023    Procedure: CYSTOSCOPY RETROGRADE PYELOGRAM WITH exchange of STENT URETERAL;  Surgeon: Js Rivas MD;  [facility];  Service: Urology   • NV CYSTO BLADDER W/URETERAL CATHETERIZATION Bilateral 05/09/2023    Procedure: CYSTOSCOPY, BILATERAL RETROGRADE PYELOGRAM, WITH LEFT INSERTION STENT URETERAL, RIGHT URTERAL STENT EXCHANGE;  Surgeon: Timur Jorge MD;  [facility];  Service: Urology   • NV CYSTO BLADDER W/URETERAL CATHETERIZATION Bilateral 8/1/2023    Procedure: CYSTOSCOPY BILATERAL RETROGRADE  WITH REMOVAL BILATERAL  STENT URETERAL;  Surgeon: Js Rivas MD;  [facility];  Service: Urology   • NV CYSTO W/INSERT URETERAL STENT Right 03/30/2023    Procedure: EXCHANGE STENT URETERAL;  Surgeon: Js Rivas MD;  [facility];  Service: Urology   • NV INSJ TUNNELED CTR VAD W/SUBQ PORT AGE 5 YR/> Left 11/12/2019    Procedure: INSERTION VENOUS PORT ( PORT-A-CATH) IR;  Surgeon: Joya Galeana DO;  [facility];  Service: Interventional Radiology   • NV LAPS ABD PRTM&OMENTUM DX W/WO SPEC BR/WA SPX N/A 12/19/2017    Procedure: LAPAROSCOPY DIAGNOSTIC;  Surgeon: Anita Joyce MD;  [facility];  Service: Gynecology Oncology   • NV LAPS W/RAD HYST W/BILAT LMPHADEC RMVL TUBE/OVARY N/A 12/19/2017    Procedure: HYSTERECTOMY LAPAROSCOPIC TOTAL ([facility]) W/ ROBOTICS; BILATERAL SALPINGOOPHERECTOMY; LYMPH NODE DISSECTION; lysis of adhesions;  Surgeon: Artemio Cronin MD;  [facility];  Service: Gynecology Oncology   • REMOVAL URETERAL STENT Bilateral 8/1/2023    Procedure: REMOVAL STENT URETERAL;  Surgeon: Man Ryan MD;  [facility];  Service: Urology   • TONSILLECTOMY     • US GUIDED BREAST BIOPSY RIGHT COMPLETE Right 06/28/2019       Family History   Problem Relation Age of Onset   • Hyperlipidemia Mother    • Heart disease Mother    • Ovarian cancer Mother 48   • Colon cancer Mother    • Lymphoma Father    • Breast cancer Sister 61   • No Known Problems Brother    • No Known Problems Brother    • No Known Problems Maternal Grandmother    • Bone cancer Maternal Grandfather    • Uterine cancer Paternal Grandmother    • No Known Problems Paternal Grandfather    • No Known Problems Maternal Aunt    • No Known Problems Maternal Aunt    • No Known Problems Maternal Aunt    • No Known Problems Maternal Aunt    • No Known Problems Paternal Aunt    • No Known Problems Paternal Aunt    • No Known Problems Paternal Aunt    • No Known Problems Paternal Aunt      I have reviewed and agree with the history as documented. E-Cigarette/Vaping   • E-Cigarette Use Never User      E-Cigarette/Vaping Substances   • Nicotine No    • THC No    • CBD No    • Flavoring No    • Other No    • Unknown No      Social History     Tobacco Use   • Smoking status: Never   • Smokeless tobacco: Never   Vaping Use   • Vaping Use: Never used   Substance Use Topics   • Alcohol use: Never   • Drug use: Never       Review of Systems   Constitutional: Negative for chills and fever. HENT: Negative for congestion, rhinorrhea and sore throat. Eyes: Negative for photophobia and visual disturbance. Respiratory: Negative for cough and shortness of breath. Cardiovascular: Positive for chest pain and leg swelling. Negative for palpitations.    Gastrointestinal: Negative for abdominal pain, blood in stool, constipation, diarrhea, nausea and vomiting. Genitourinary: Negative for difficulty urinating, dysuria, flank pain, frequency, hematuria and urgency. Musculoskeletal: Negative for back pain, gait problem and neck pain. Skin: Negative for color change and rash. Neurological: Negative for dizziness, syncope, weakness and headaches. All other systems reviewed and are negative. Physical Exam  Physical Exam  Vitals and nursing note reviewed. Constitutional:       General: She is awake. Appearance: Normal appearance. She is well-developed. She is not toxic-appearing or diaphoretic. HENT:      Head: Normocephalic and atraumatic. Right Ear: External ear normal.      Left Ear: External ear normal.      Nose: Nose normal.      Mouth/Throat:      Lips: Pink. Mouth: Mucous membranes are moist.   Eyes:      General: Lids are normal. No scleral icterus. Conjunctiva/sclera: Conjunctivae normal.      Pupils: Pupils are equal, round, and reactive to light. Cardiovascular:      Rate and Rhythm: Normal rate and regular rhythm. Pulses: Normal pulses. Radial pulses are 2+ on the right side and 2+ on the left side. Heart sounds: Normal heart sounds, S1 normal and S2 normal.   Pulmonary:      Effort: Pulmonary effort is normal. No accessory muscle usage. Breath sounds: Normal breath sounds. No stridor. No decreased breath sounds, wheezing, rhonchi or rales. Chest:      Comments: No reproducible tenderness to palpation along the chest wall. No overlying rash or skin changes. Abdominal:      General: Abdomen is flat. Bowel sounds are normal. There is no distension. Palpations: Abdomen is soft. Tenderness: There is no abdominal tenderness. There is no right CVA tenderness, left CVA tenderness, guarding or rebound. Musculoskeletal:      Cervical back: Full passive range of motion without pain and neck supple. No signs of trauma. No pain with movement.       Right lower le+ Pitting Edema present. Left lower le+ Pitting Edema present. Comments: Bilateral pedal edema that extends to the ankles, worse on the left. Mild tenderness along both posterior calves. Negative Homer's sign. No overlying erythema/warmth. PT/DP pulses 2+. Sensation intact. Cap refill <2 seconds. Lymphadenopathy:      Cervical: No cervical adenopathy. Skin:     General: Skin is warm and dry. Capillary Refill: Capillary refill takes less than 2 seconds. Coloration: Skin is not cyanotic, jaundiced or pale. Neurological:      Mental Status: She is alert and oriented to person, place, and time. GCS: GCS eye subscore is 4. GCS verbal subscore is 5. GCS motor subscore is 6. Gait: Gait normal.   Psychiatric:         Mood and Affect: Mood normal.         Speech: Speech normal.         Behavior: Behavior is cooperative.          Vital Signs  ED Triage Vitals   Temperature Pulse Respirations Blood Pressure SpO2   23 1002 23 1002 23 1002 23 1002 23 1002   97.6 °F (36.4 °C) 76 18 116/60 98 %      Temp src Heart Rate Source Patient Position - Orthostatic VS BP Location FiO2 (%)   -- 23 1205 23 1205 23 1205 --    Monitor Sitting Right arm       Pain Score       23 1819       No Pain           Vitals:    23 1002 23 1205 23 1400 23 1838   BP: 116/60 124/60 116/59 131/68   Pulse: 76 69 72 65   Patient Position - Orthostatic VS:  Sitting Lying          Visual Acuity      ED Medications  Medications   ARIPiprazole (ABILIFY) tablet 20 mg (has no administration in time range)   cholecalciferol (VITAMIN D3) tablet 4,000 Units (has no administration in time range)   cyanocobalamin (VITAMIN B-12) tablet 500 mcg (500 mcg Oral Given 23 184)   dronabinol (MARINOL) capsule 2.5 mg (2.5 mg Oral Not Given 23 7424)   folic acid (FOLVITE) tablet 1 mg (has no administration in time range)   Vibegron TABS 1 capsule (has no administration in time range)   multivitamin stress formula tablet 1 tablet (has no administration in time range)   pantoprazole (PROTONIX) EC tablet 40 mg (has no administration in time range)   oxybutynin (DITROPAN-XL) 24 hr tablet 15 mg (has no administration in time range)   saccharomyces boulardii (FLORASTOR) capsule 250 mg (250 mg Oral Given 8/14/23 1844)   senna (SENOKOT) tablet 8.6 mg (8.6 mg Oral Not Given 8/14/23 1847)   sodium bicarbonate tablet 650 mg (650 mg Oral Given 8/14/23 1845)   sodium chloride (PF) 0.9 % injection 10 mL (has no administration in time range)   venlafaxine (EFFEXOR) tablet 75 mg (has no administration in time range)   ondansetron (ZOFRAN) injection 4 mg (4 mg Intravenous Not Given 8/14/23 1855)   heparin (porcine) subcutaneous injection 5,000 Units (5,000 Units Subcutaneous Given 8/14/23 1845)   furosemide (LASIX) injection 20 mg (20 mg Intravenous Given 8/14/23 1845)       Diagnostic Studies  Results Reviewed     Procedure Component Value Units Date/Time    TSH, 3rd generation [[id number]]  (Normal) Collected: 08/14/23 1527    Lab Status: Final result Specimen: Blood from Arm, Left Updated: 08/14/23 1616     TSH 3RD GENERATON 2.052 uIU/mL     HS Troponin 0hr (reflex protocol) [[id number]]  (Normal) Collected: 08/14/23 1527    Lab Status: Final result Specimen: Blood from Arm, Left Updated: 08/14/23 1608     hs TnI 0hr 3 ng/L     HS Troponin I 2hr [[id number]]     Lab Status: No result Specimen: Blood     HS Troponin I 4hr [[id number]]     Lab Status: No result Specimen: Blood     Platelet count [[id number]]  (Normal) Collected: 08/14/23 1527    Lab Status: Final result Specimen: Blood from Arm, Left Updated: 08/14/23 1544     Platelets 449 Thousands/uL      MPV 9.7 fL     UA w Reflex to Microscopic w Reflex to Culture [[id number]]     Lab Status: No result Specimen: Urine, Clean Catch     D-Dimer [[id number]]  (Abnormal) Collected: 08/14/23 3030    Lab Status: Final result Specimen: Blood from Arm, Left Updated: 08/14/23 1234     D-Dimer, Quant 1.65 ug/ml FEU     Narrative: In the evaluation for possible pulmonary embolism, in the appropriate (Well's Score of 4 or less) patient, the age adjusted d-dimer cutoff for this patient can be calculated as:    Age x 0.01 (in ug/mL) for Age-adjusted D-dimer exclusion threshold for a patient over 50 years.     B-Type Natriuretic Peptide(BNP) [[id number]]  (Normal) Collected: 08/14/23 1150    Lab Status: Final result Specimen: Blood from Arm, Left Updated: 08/14/23 1232     BNP 29 pg/mL     Comprehensive metabolic panel [[id number]]  (Abnormal) Collected: 08/14/23 1150    Lab Status: Final result Specimen: Blood from Arm, Left Updated: 08/14/23 1229     Sodium 138 mmol/L      Potassium 4.2 mmol/L      Chloride 108 mmol/L      CO2 23 mmol/L      ANION GAP 7 mmol/L      BUN 61 mg/dL      Creatinine 2.12 mg/dL      Glucose 68 mg/dL      Calcium 8.9 mg/dL      AST 15 U/L      ALT 14 U/L      Alkaline Phosphatase 133 U/L      Total Protein 7.6 g/dL      Albumin 3.7 g/dL      Total Bilirubin 0.28 mg/dL      eGFR 23 ml/min/1.73sq m     Narrative:      [place] guidelines for Chronic Kidney Disease (CKD):   •  Stage 1 with normal or high GFR (GFR > 90 mL/min/1.73 square meters)  •  Stage 2 Mild CKD (GFR = 60-89 mL/min/1.73 square meters)  •  Stage 3A Moderate CKD (GFR = 45-59 mL/min/1.73 square meters)  •  Stage 3B Moderate CKD (GFR = 30-44 mL/min/1.73 square meters)  •  Stage 4 Severe CKD (GFR = 15-29 mL/min/1.73 square meters)  •  Stage 5 End Stage CKD (GFR <15 mL/min/1.73 square meters)  Note: GFR calculation is accurate only with a steady state creatinine    Magnesium [[id number]]  (Normal) Collected: 08/14/23 1150    Lab Status: Final result Specimen: Blood from Arm, Left Updated: 08/14/23 1229     Magnesium 2.2 mg/dL     HS Troponin 0hr (reflex protocol) [[id number]]  (Normal) Collected: 08/14/23 1150    Lab Status: Final result Specimen: Blood from Arm, Left Updated: 08/14/23 1228     hs TnI 0hr 3 ng/L     Protime-INR [[id number]]  (Normal) Collected: 08/14/23 1150    Lab Status: Final result Specimen: Blood from Arm, Left Updated: 08/14/23 1215     Protime 13.8 seconds      INR 1.00    APTT [[id number]]  (Normal) Collected: 08/14/23 1150    Lab Status: Final result Specimen: Blood from Arm, Left Updated: 08/14/23 1215     PTT 32 seconds     CBC and differential [[id number]]  (Abnormal) Collected: 08/14/23 1150    Lab Status: Final result Specimen: Blood from Arm, Left Updated: 08/14/23 1204     WBC 7.61 Thousand/uL      RBC 3.02 Million/uL      Hemoglobin 8.9 g/dL      Hematocrit 29.0 %      MCV 96 fL      MCH 29.5 pg      MCHC 30.7 g/dL      RDW 13.2 %      MPV 9.6 fL      Platelets 769 Thousands/uL      nRBC 0 /100 WBCs      Neutrophils Relative 76 %      Immat GRANS % 1 %      Lymphocytes Relative 11 %      Monocytes Relative 10 %      Eosinophils Relative 1 %      Basophils Relative 1 %      Neutrophils Absolute 5.81 Thousands/µL      Immature Grans Absolute 0.08 Thousand/uL      Lymphocytes Absolute 0.82 Thousands/µL      Monocytes Absolute 0.77 Thousand/µL      Eosinophils Absolute 0.08 Thousand/µL      Basophils Absolute 0.05 Thousands/µL                  XR chest 1 view portable   Final Result by Aniya Watt MD (08/14 1305)      No acute cardiopulmonary disease.                   Workstation performed: [id number]         VAS lower limb venous duplex study, complete bilateral    (Results Pending)              Procedures  ECG 12 Lead Documentation Only    Date/Time: 8/14/2023 5:10 PM    Performed by: Isabelle Mckenna PA-C  Authorized by: Isabelle Mckenna PA-C    Indications / Diagnosis:  Cp  ECG reviewed by me, the ED Provider: yes    Patient location:  ED  Previous ECG:     Previous ECG:  Unavailable    Comparison to cardiac monitor: Yes    Rate:     ECG rate:  62    ECG rate assessment: normal    Rhythm: Rhythm: sinus rhythm    Ectopy:     Ectopy: none    QRS:     QRS axis:  Left    QRS intervals:  Normal  Conduction:     Conduction: normal    ST segments:     ST segments:  Normal  T waves:     T waves: inverted      Inverted:  V1, III and aVR  Comments:      No STEMI. QT/QTc 422/428             ED Course  ED Course as of 08/14/23 2008   Mon Aug 14, 2023   1219 Per vascular tech, venous duplex negative for DVT in bilateral lower extremities. 1252 Hemoglobin(!): 8.9  Baseline for patient. 1253 Creatinine(!): 2.12             HEART Risk Score    Flowsheet Row Most Recent Value   Heart Score Risk Calculator    History 0 Filed at: 08/14/2023 1254   ECG 0 Filed at: 08/14/2023 1254   Age 2 Filed at: 08/14/2023 1254   Risk Factors 2 Filed at: 08/14/2023 1254   Troponin 0 Filed at: 08/14/2023 1254   HEART Score 4 Filed at: 08/14/2023 1254                PERC Rule for PE    Flowsheet Row Most Recent Value   PERC Rule for PE    Age >=50 1 Filed at: 08/14/2023 1254   HR >=100 0 Filed at: 08/14/2023 1254   O2 Sat on room air < 95% 0 Filed at: 08/14/2023 1254   History of PE or DVT 1 Filed at: 08/14/2023 1254   Recent trauma or surgery 1 Filed at: 08/14/2023 1254   Hemoptysis 0 Filed at: 08/14/2023 1254   Exogenous estrogen 0 Filed at: 08/14/2023 1254   Unilateral leg swelling 0 Filed at: 08/14/2023 1254   PERC Rule for PE Results 3 Filed at: 08/14/2023 1254                            Medical Decision Making  Patient is a 81-year-old female with a PMHx of anemia, bilateral nephrostomy tubes, CKD 3, DVT, endometrial cancer, GERD, HLD and HTN, presenting to the ED for evaluation of bilateral lower extremity edema x3 days. Labs are notable for anemia with a hemoglobin of 8.9; however, this is at baseline for patient. Her creatinine is elevated at 2.12 which is mildly elevated from her baseline (was 1.98 3 days ago). Venous duplex of the bilateral lower extremities negative for DVT.  BNP is normal and chest x-ray shows no evidence of pulmonary edema or other acute cardiopulmonary disease. Troponin negative. EKG normal sinus rhythm without ischemic changes. Patient also experiencing left-sided chest pain. She is not tachycardic or hypoxic and denies SOB; however, she is high risk for PE with history of cancer and recent surgery on 8/1/23 so a d-dimer was obtained. D-dimer is mildly elevated; however, given patient's worsening kidney function and low clinical suspicion for PE she would be more suitable for a V/Q scan to avoid IV contrast which would likely cause worsening renal function. Patient was admitted to internal medicine for further work-up of new onset lower extremity edema, worsening kidney function and to rule out PE. I discussed all results with patient in detail and she is in agreement with admission for further work-up. Amount and/or Complexity of Data Reviewed  Labs: ordered. Decision-making details documented in ED Course. Radiology: ordered. Risk  Decision regarding hospitalization. Disposition  Final diagnoses:   Bilateral lower extremity edema   Chest pain   Elevated serum creatinine   Elevated d-dimer     Time reflects when diagnosis was documented in both MDM as applicable and the Disposition within this note     Time User Action Codes Description Comment    8/14/2023  1:57 PM Kolby Moncada Add [R60.0] Bilateral lower extremity edema     8/14/2023  1:57 PM Debo Springer Add [R07.9] Chest pain     8/14/2023  1:57 PM Debo Springer Add [R79.89] Elevated serum creatinine     8/14/2023  1:58 PM Kolby Moncada Add [R79.89] Elevated d-dimer       ED Disposition     ED Disposition   Admit    Condition   Stable    Date/Time   Mon Aug 14, 2023  1:57 PM    Comment   Case was discussed with DAVID and the patient's admission status was agreed to be Admission Status: inpatient status to the service of Dr. Deep Treviño.            Follow-up Information    None         Current Discharge Medication List      CONTINUE these medications which have NOT CHANGED    Details   ARIPiprazole (ABILIFY) 20 MG tablet Take 20 mg by mouth every morning      Calcium-Magnesium-Vitamin D (CALCIUM 500 PO) Take 1 capsule by mouth daily at bedtime      cholecalciferol (VITAMIN D3) 1,000 units tablet Take 4 tablets (4,000 Units total) by mouth daily  Qty: 90 tablet, Refills: 0    Associated Diagnoses: Vitamin D insufficiency      Cyanocobalamin (VITAMIN B12 PO) Take 1 capsule by mouth daily after lunch      folic acid (FOLVITE) 1 mg tablet Take 1 tablet (1 mg total) by mouth daily  Refills: 0    Associated Diagnoses: Anemia      Gemtesa 75 MG TABS TAKE 75 MG BY MOUTH IN THE MORNING  Qty: 90 tablet, Refills: 2    Associated Diagnoses: OAB (overactive bladder)      Multiple Vitamin (MULTIVITAMIN) tablet Take 1 tablet by mouth every morning      omeprazole (PriLOSEC) 20 mg delayed release capsule Take 20 mg by mouth every morning      oxybutynin (DITROPAN XL) 15 MG 24 hr tablet Take 1 tablet (15 mg total) by mouth daily at bedtime  Qty: 90 tablet, Refills: 3    Associated Diagnoses: Mixed stress and urge urinary incontinence      saccharomyces boulardii (FLORASTOR) 250 mg capsule Take 1 capsule (250 mg total) by mouth 2 (two) times a day  Refills: 0    Associated Diagnoses: MSSA bacteremia      senna (SENOKOT) 8.6 MG tablet Take 1 tablet (8.6 mg total) by mouth 2 (two) times a day as needed for constipation  Qty: 60 tablet, Refills: 0    Associated Diagnoses: Drug induced constipation      venlafaxine (EFFEXOR) 75 mg tablet Take 75 mg by mouth every morning      aspirin (ECOTRIN LOW STRENGTH) 81 mg EC tablet Take 81 mg by mouth daily after lunch      dronabinol (MARINOL) 2.5 mg capsule Take 1 capsule (2.5 mg total) by mouth 2 (two) times a day before meals  Qty: 30 capsule, Refills: 0    Associated Diagnoses: Cachexia ([address]); Anorexia;  Chemotherapy-induced nausea      sodium bicarbonate 650 mg tablet Take 650 mg by mouth 3 (three) times a day      !! sodium chloride, PF, 0.9 % 10 mL by Intracatheter route daily Intracatheter flushing daily. May substitute prefilled syringe with normal saline 10 mL vials, 10 mL syringes, and 18 g blunt needles  Qty: 900 mL, Refills: 0    Associated Diagnoses: Hydronephrosis; Acute kidney injury ([address]); Chronic kidney disease, unspecified CKD stage      !! sodium chloride, PF, 0.9 % 10 mL by Intracatheter route daily Intracatheter flushing daily. May substitute prefilled syringe with normal saline 10 mL vials, 10 mL syringes, and 18 g blunt needles  Qty: 300 mL, Refills: 2    Associated Diagnoses: Bilateral ureteral obstruction       !! - Potential duplicate medications found. Please discuss with provider. No discharge procedures on file.     PDMP Review       Value Time User    PDMP Reviewed  Yes 8/26/2022  4:42 PM Gerry El PA-C          ED Provider  Electronically Signed by           Ron Costello PA-C  08/14/23 2008

## 2023-08-14 NOTE — ASSESSMENT & PLAN NOTE
· Patient with sudden severe pain under her left breast which she described as feeling like a bruise on Friday.   Pain is nearly gone at this time  · No obvious injury rash to suggest zoster  · Low suspicion of cardiac event but can complete trending of troponins  · 24 hour tele  · T/c repeat echo  · Currently with no hypoxia and stable heart rate low suspicion of PE--could consider VQ scan d/t d-dimer 1.65 but this is nonspecific

## 2023-08-14 NOTE — ASSESSMENT & PLAN NOTE
Lab Results   Component Value Date    EGFR 23 08/14/2023    EGFR 25 08/11/2023    EGFR 28 08/08/2023    CREATININE 2.12 (H) 08/14/2023    CREATININE 1.98 (H) 08/11/2023    CREATININE 1.83 (H) 08/08/2023   · Baseline CKD range approx 1.6-2, stable

## 2023-08-14 NOTE — ASSESSMENT & PLAN NOTE
· Bilateral pedal edema, left slightly more so than right. No reports of pain or trauma  · Doppler negative for DVT  · BNP negative, no history of heart failure. · 2D echo December 2022 with mild diastolic dysfunction and normal EF. Consider repeating echo nonurgently  · No obvious offending medication  · Albumin normal  · Does admit to eating out a lot suggestive of high salt diet but denies prolonged standing  · Recommend salt restriction, elevation and compression.  Could try one low dose lasix Low Dose Naltrexone Pregnancy And Lactation Text: Naltrexone is pregnancy category C.  There have been no adequate and well-controlled studies in pregnant women.  It should be used in pregnancy only if the potential benefit justifies the potential risk to the fetus.   Limited data indicates that naltrexone is minimally excreted into breastmilk.

## 2023-08-14 NOTE — TELEPHONE ENCOUNTER
Called and spoke with the patient regarding her most recent blood work results. Patient made aware that her Vit B12 level was low and instructed to start taking 1000 mcg Vit B12 sublingually daily. Patient made aware scripts for blood work were placed in chart and to be done prior to her next appt in Feb.     Angela Cuellar, would you please schedule patient for an appt with Alice De Leon in Feb 2024? Thank you!      ----- Message from Bev Espinosa RN sent at 8/14/2023 10:36 AM EDT -----  Can you please schedule f/u appointment, order labs and call patient to make her aware of attached recommendations.   ----- Message -----  From: Ezra Santos PA-C  Sent: 8/14/2023  10:22 AM EDT  To: Cassie Guerra RN; Bev Espinosa RN    B12 is low normal.  Recommend SubLingual B12 1000mcg daily.        Needs f/u in 6 months with repeat CBCD, ironpanel, B12, SPEP, quantitative immunoglobulins, serum free light chains,

## 2023-08-14 NOTE — H&P
[address]  H&P  Name: Ana Pain[age] y.o. female I MRN: [id number]  Unit/Bed#: ED-04 I Date of Admission: 8/14/2023   Date of Service: 8/14/2023 I Hospital Day: 1      Assessment/Plan   * Bilateral lower extremity edema  Assessment & Plan  · Bilateral pedal edema, left slightly more so than right. No reports of pain or trauma  · Doppler negative for DVT  · BNP negative, no history of heart failure. · 2D echo December 2022 with mild diastolic dysfunction and normal EF. Consider repeating echo nonurgently  · No obvious offending medication  · Albumin normal  · Does admit to eating out a lot suggestive of high salt diet but denies prolonged standing  · Recommend salt restriction, elevation and compression. Could try one low dose lasix    Left-sided chest wall pain  Assessment & Plan  · Patient with sudden severe pain under her left breast which she described as feeling like a bruise on Friday. Pain is nearly gone at this time  · No obvious injury rash to suggest zoster  · Low suspicion of cardiac event but can complete trending of troponins  · 24 hour tele  · T/c repeat echo  · Currently with no hypoxia and stable heart rate low suspicion of PE--could consider VQ scan d/t d-dimer 1.65 but this is nonspecific    Stage 3 chronic kidney disease [facility])  Assessment & Plan  Lab Results   Component Value Date    EGFR 23 08/14/2023    EGFR 25 08/11/2023    EGFR 28 08/08/2023    CREATININE 2.12 (H) 08/14/2023    CREATININE 1.98 (H) 08/11/2023    CREATININE 1.83 (H) 08/08/2023   · Baseline CKD range approx 1.6-2, stable    History of DVT (deep vein thrombosis)  Assessment & Plan  · History of right lower extremity DVT in summer 2019 followed by right internal jugular DVT in 10/2019 for which she was changed to sc lovenox. This was stopped June 2023 by surg onc.   · spoke to her outpatient oncology team --she was not felt to require lifelong anticoagulation as her DVTs were felt to be provoked by her cancer which is now cured    Anemia  Assessment & Plan  · Known history of anemia being followed by hematology--iron infusions through their office as needed  · Continue B12 and folate. · No reports of active bleeding or concern for sudden drop in hemoglobin       VTE Pharmacologic Prophylaxis: VTE Score: 8sc heparin  Code Status: Level 1 - Full Code full code per discussion with patient  Discussion with family:    Anticipated Length of Stay: Patient will be admitted on an observation basis with an anticipated length of stay of less than 2 midnights secondary to mild edema, chest wall pain. Total Time Spent on Date of Encounter in care of patient: 60 min This time was spent on one or more of the following: performing physical exam; counseling and coordination of care; obtaining or reviewing history; documenting in the medical record; reviewing/ordering tests, medications or procedures; communicating with other healthcare professionals and discussing with patient's family/caregivers. Case was discussed with oncology and my attending    Chief Complaint: swollen feet    History of Present Illness:  Alissa Romero is a 74 y.o. female with a PMH of endometrial cancer, bilateral nephrostomy tubes, anemia, chronic kidney disease stage III and prior DVT who presents with new swelling in her feet. Patient states that on Saturday she noted very slight swelling in her feet which got progressively worse on Sunday and was definitely notable today prompting her to come to the hospital for evaluation. She denies any new change in her medications or being on her feet a long time in the heat over the weekend. She does admit that she eats out a lot and does not specifically monitor her salt intake. She has not noted any shortness of breath.   She denied any pain or trauma in her lower extremities    In addition she states that on Friday she had very severe pain on her chest wall under her left breast which she compared to the feeling of having an underwire bra rubbing on the area causing a bruise. However she was not wearing an underwire and did not sustain any other trauma to that area nor did she note any other rash. Pain at this time is resolved    Review of Systems:  Review of Systems   Constitutional: Negative for activity change, appetite change, chills, diaphoresis, fatigue, fever and unexpected weight change. HENT: Negative for congestion and sore throat. Respiratory: Negative for apnea, cough, choking, chest tightness, shortness of breath, wheezing and stridor. Cardiovascular: Positive for chest pain and leg swelling. Negative for palpitations. Gastrointestinal: Negative for abdominal distention, abdominal pain, blood in stool, constipation, diarrhea, nausea and rectal pain. Genitourinary: Negative for decreased urine volume, difficulty urinating and hematuria. Musculoskeletal: Negative for back pain, gait problem, joint swelling, myalgias, neck pain and neck stiffness. Skin: Negative for color change, pallor, rash and wound. Neurological: Negative for dizziness, tremors, weakness and headaches.        Past Medical and Surgical History:   Past Medical History:   Diagnosis Date   • Anemia    • Anxiety    • Cancer ([address])     ENDOMETRIAL   • Chemotherapy induced neutropenia ([address]) 8/30/2019   • CKD (chronic kidney disease) stage 3, GFR 30-59 ml/min ([facility])    • COVID     Fall 2022   • Depression    • DVT (deep venous thrombosis) ([address]) 07/19/2019    RIGHT LEG   • Endometrial cancer ([address]) 12/2017   • GERD (gastroesophageal reflux disease)    • H/O gastric bypass    • Hard to intubate     pt denies AS OF 7/25/23   • History of chemotherapy     started 12/2021endometrial cancer- done 12/23/22   • History of DVT in adulthood     RLE   • History of transfusion 04/13/2023   • Hyperglycemia     vx type 2 dm -- last assessed 4/1/14; resolved 11/7/17   • Hyperlipidemia    • Hypertension     in past- no meds at present   • Iron deficiency anemia     iron infusions weekly   • OAB (overactive bladder)    • Port-A-Cath in place    • Wears glasses        Past Surgical History:   Procedure Laterality Date   • ABDOMINAL SURGERY      GASTRIC BYPASS; 2001   • BREAST BIOPSY Right 06/28/2019    core biopsy; benign   • CHOLECYSTECTOMY      at the time of gastric bypass   • COLONOSCOPY     • CT NEEDLE BIOPSY LYMPH NODE  07/08/2019   • FL GUIDED CENTRAL VENOUS ACCESS DEVICE INSERTION  11/12/2019   • FL RETROGRADE PYELOGRAM  03/30/2023   • FL RETROGRADE PYELOGRAM  05/09/2023   • FL RETROGRADE PYELOGRAM  8/1/2023   • GASTRIC BYPASS     • HYSTERECTOMY Bilateral 2017    total abdominal with salpingo-oophorectomy   • IR NEPHROSTOMY TO NEPHROURETERAL STENT  06/09/2022   • IR NEPHROSTOMY TO NEPHROURETERAL STENT  12/20/2022   • IR NEPHROSTOMY TO NEPHROURETERAL STENT  8/8/2023   • IR NEPHROSTOMY TUBE CHECK/CHANGE/REPOSITION/REINSERTION/UPSIZE  05/27/2022   • IR NEPHROSTOMY TUBE PLACEMENT  07/26/2019   • IR NEPHROSTOMY TUBE PLACEMENT  05/27/2023   • IR NEPHROURETERAL STENT CHECK/CHANGE/REPOSITION  04/06/2021   • IR NEPHROURETERAL STENT CHECK/CHANGE/REPOSITION  06/04/2021   • IR NEPHROURETERAL STENT CHECK/CHANGE/REPOSITION  09/03/2021   • IR NEPHROURETERAL STENT CHECK/CHANGE/REPOSITION  12/07/2021   • IR NEPHROURETERAL STENT CHECK/CHANGE/REPOSITION  03/08/2022   • IR NEPHROURETERAL STENT CHECK/CHANGE/REPOSITION  05/10/2022   • IR NEPHROURETERAL STENT CHECK/CHANGE/REPOSITION  09/19/2022   • IR PICC LINE  09/27/2019   • IR PORT PLACEMENT  07/26/2019   • IR PORT PLACEMENT     • IR PORT REMOVAL  09/20/2019   • OOPHORECTOMY Bilateral 2017   • WA CYSTO BLADDER W/URETERAL CATHETERIZATION Right 03/30/2023    Procedure: CYSTOSCOPY RETROGRADE PYELOGRAM WITH exchange of STENT URETERAL;  Surgeon: Mane Rao MD;  [facility]: [facility];  Service: Urology   • WA CYSTO BLADDER W/URETERAL CATHETERIZATION Bilateral 05/09/2023    Procedure: CYSTOSCOPY, BILATERAL RETROGRADE PYELOGRAM, WITH LEFT INSERTION STENT URETERAL, RIGHT URTERAL STENT EXCHANGE;  Surgeon: Benny Hidalgo MD;  [facility];  Service: Urology   • PA CYSTO BLADDER W/URETERAL CATHETERIZATION Bilateral 8/1/2023    Procedure: CYSTOSCOPY BILATERAL RETROGRADE  WITH REMOVAL BILATERAL  STENT URETERAL;  Surgeon: Jose Haider MD;  [facility];  Service: Urology   • PA CYSTO W/INSERT URETERAL STENT Right 03/30/2023    Procedure: EXCHANGE STENT URETERAL;  Surgeon: Jose Haider MD;  [facility];  Service: Urology   • PA INSJ TUNNELED CTR VAD W/SUBQ PORT AGE 5 YR/> Left 11/12/2019    Procedure: INSERTION VENOUS PORT ( PORT-A-CATH) IR;  Surgeon: Quincy Humphries DO;  [facility];  Service: Interventional Radiology   • PA LAPS ABD PRTM&OMENTUM DX W/WO SPEC BR/WA SPX N/A 12/19/2017    Procedure: LAPAROSCOPY DIAGNOSTIC;  Surgeon: Claudeen Passy, MD;  [facility];  Service: Gynecology Oncology   • PA LAPS W/RAD HYST W/BILAT LMPHADEC RMVL TUBE/OVARY N/A 12/19/2017    Procedure: HYSTERECTOMY LAPAROSCOPIC TOTAL ([facility]) W/ ROBOTICS; BILATERAL SALPINGOOPHERECTOMY; LYMPH NODE DISSECTION; lysis of adhesions;  Surgeon: Claudeen Passy, MD;  [facility];  Service: Gynecology Oncology   • REMOVAL URETERAL STENT Bilateral 8/1/2023    Procedure: REMOVAL STENT URETERAL;  Surgeon: Jose Haider MD;  [facility];  Service: Urology   • TONSILLECTOMY     • US GUIDED BREAST BIOPSY RIGHT COMPLETE Right 06/28/2019       Meds/Allergies:  Prior to Admission medications    Medication Sig Start Date End Date Taking?  Authorizing Provider   ARIPiprazole (ABILIFY) 20 MG tablet Take 20 mg by mouth every morning 2/10/22   Historical Provider, MD   aspirin (ECOTRIN LOW STRENGTH) 81 mg EC tablet Take 81 mg by mouth daily after lunch  Patient not taking: Reported on 8/7/2023    Historical Provider, MD   Calcium-Magnesium-Vitamin D (CALCIUM 500 PO) Take 1 capsule by mouth daily at bedtime    Historical Provider, MD   cholecalciferol (VITAMIN D3) 1,000 units tablet Take 4 tablets (4,000 Units total) by mouth daily  Patient taking differently: Take 4,000 Units by mouth daily at bedtime 2/1/23   Emely Yost DO   Cyanocobalamin (VITAMIN B12 PO) Take 1 capsule by mouth daily after lunch    Historical Provider, MD   dronabinol (MARINOL) 2.5 mg capsule Take 1 capsule (2.5 mg total) by mouth 2 (two) times a day before meals 8/26/22   Debo Hale PA-C   folic acid (FOLVITE) 1 mg tablet Take 1 tablet (1 mg total) by mouth daily  Patient taking differently: Take 1 mg by mouth daily at bedtime 10/8/19   Sybil Eller DO   Gemtesa 75 MG TABS TAKE 75 MG BY MOUTH IN THE MORNING  Patient taking differently: Take 1 capsule by mouth every morning 12/14/22   RAFITA Bertrand   Multiple Vitamin (MULTIVITAMIN) tablet Take 1 tablet by mouth every morning    Historical Provider, MD   omeprazole (PriLOSEC) 20 mg delayed release capsule Take 20 mg by mouth every morning    Historical Provider, MD   oxybutynin (DITROPAN XL) 15 MG 24 hr tablet Take 1 tablet (15 mg total) by mouth daily at bedtime 10/5/22   RAFITA Cyr   saccharomyces boulardii (FLORASTOR) 250 mg capsule Take 1 capsule (250 mg total) by mouth 2 (two) times a day  Patient taking differently: Take 250 mg by mouth daily after lunch 10/8/19   Sybil Eller DO   senna (SENOKOT) 8.6 MG tablet Take 1 tablet (8.6 mg total) by mouth 2 (two) times a day as needed for constipation 5/31/22 April D RAFITA Hassan   sodium bicarbonate 650 mg tablet Take 650 mg by mouth 3 (three) times a day    Historical Provider, MD   sodium chloride PF, 0.9 % 10 mL by Intracatheter route daily Intracatheter flushing daily.  May substitute prefilled syringe with normal saline 10 mL vials, 10 mL syringes, and 18 g blunt needles 5/27/23 8/25/23  Rodolfo Coleman MD   sodium chloride, PF, 0.9 % 10 mL by Intracatheter route daily Intracatheter flushing daily. May substitute prefilled syringe with normal saline 10 mL vials, 10 mL syringes, and 18 g blunt needles 8/8/23 11/6/23  Delfina Castro MD   [facility]) 75 mg tablet Take 75 mg by mouth every morning    Historical Provider, MD WISE have reviewed home medications with patient personally. Allergies: Allergies   Allergen Reactions   • Cephalosporins Rash     Which Cephalosporin reaction was to not specified; however, has tolerated Amoxicillin, Cefazolin, and Cefepime       Social History:  Marital Status: Single   Occupation:   Patient Pre-hospital Living Situation: Lives with her mother  Patient Pre-hospital Level of Mobility: walks  Patient Pre-hospital Diet Restrictions: No restrictions  Substance Use History:   Social History     Substance and Sexual Activity   Alcohol Use Never     Social History     Tobacco Use   Smoking Status Never   Smokeless Tobacco Never     Social History     Substance and Sexual Activity   Drug Use Never       Family History:  Noncontributory    Physical Exam:     Vitals:   Blood Pressure: 116/59 (08/14/23 1400)  Pulse: 72 (08/14/23 1400)  Temperature: 97.6 °F (36.4 °C) (08/14/23 1002)  Respirations: 16 (08/14/23 1400)  SpO2: 99 % (08/14/23 1400)    Physical Exam  Vitals reviewed. Constitutional:       General: She is not in acute distress. Appearance: Normal appearance. She is not ill-appearing, toxic-appearing or diaphoretic. HENT:      Head: Normocephalic. Nose: No congestion or rhinorrhea. Mouth/Throat:      Mouth: Mucous membranes are moist.      Pharynx: Oropharynx is clear. No oropharyngeal exudate or posterior oropharyngeal erythema. Eyes:      General: No scleral icterus. Right eye: No discharge. Left eye: No discharge. Conjunctiva/sclera: Conjunctivae normal.   Cardiovascular:      Rate and Rhythm: Normal rate and regular rhythm. Heart sounds: No murmur heard.   Pulmonary: Effort: No respiratory distress. Breath sounds: Normal breath sounds. No stridor. No wheezing, rhonchi or rales. Comments: No evidence of rash, bruising or any tenderness to palpation on chest wall  Chest:      Chest wall: No tenderness. Abdominal:      General: There is no distension. Palpations: Abdomen is soft. Tenderness: There is no abdominal tenderness. There is no guarding. Genitourinary:     Comments: Bilateral percutaneous nephrostomy drains with clear urine  Musculoskeletal:         General: No swelling, tenderness, deformity or signs of injury. Right lower leg: Edema present. Left lower leg: Edema present. Comments: Negative Homans' sign. Bilateral left greater than right pedal/ankle edema with pitting   Skin:     General: Skin is warm and dry. Coloration: Skin is not jaundiced or pale. Findings: No bruising, erythema, lesion or rash. Neurological:      General: No focal deficit present. Mental Status: She is alert. Mental status is at baseline.          Additional Data:     Lab Results:  Results from last 7 days   Lab Units 08/14/23  1150   WBC Thousand/uL 7.61   HEMOGLOBIN g/dL 8.9*   HEMATOCRIT % 29.0*   PLATELETS Thousands/uL 285   NEUTROS PCT % 76*   LYMPHS PCT % 11*   MONOS PCT % 10   EOS PCT % 1     Results from last 7 days   Lab Units 08/14/23  1150   SODIUM mmol/L 138   POTASSIUM mmol/L 4.2   CHLORIDE mmol/L 108   CO2 mmol/L 23   BUN mg/dL 61*   CREATININE mg/dL 2.12*   ANION GAP mmol/L 7   CALCIUM mg/dL 8.9   ALBUMIN g/dL 3.7   TOTAL BILIRUBIN mg/dL 0.28   ALK PHOS U/L 133*   ALT U/L 14   AST U/L 15   GLUCOSE RANDOM mg/dL 68     Results from last 7 days   Lab Units 08/14/23  1150   INR  1.00                   Lines/Drains:  Invasive Devices     Central Venous Catheter Line  Duration           Port A Cath 11/12/19 Left Chest 1371 days          Peripheral Intravenous Line  Duration           Peripheral IV 08/14/23 Left;Proximal;Ventral (anterior) Forearm <1 day          Drain  Duration           Ureteral Internal Stent Right ureter 6 Fr. 137 days    Ureteral Internal Stent Left ureter 7 Fr. 97 days    Ureteral Internal Stent Right ureter 7 Fr. 97 days    Nephrostomy Left 10.2 Fr. 6 days    Nephrostomy Right 10.2 Fr. 6 days                Central Line:  Goal for removal:           Imaging: Chest x-ray and lower extremity Doppler  XR chest 1 view portable   Final Result by Dixie Arita MD (08/14 1305)      No acute cardiopulmonary disease. Workstation performed: [id number]         VAS lower limb venous duplex study, complete bilateral    (Results Pending)       EKG and Other Studies Reviewed on Admission:   · EKG: T wave inversion lead III    ** Please Note: This note has been constructed using a voice recognition system.  **

## 2023-08-15 VITALS
RESPIRATION RATE: 18 BRPM | HEART RATE: 63 BPM | SYSTOLIC BLOOD PRESSURE: 109 MMHG | DIASTOLIC BLOOD PRESSURE: 62 MMHG | TEMPERATURE: 97.8 F | OXYGEN SATURATION: 99 %

## 2023-08-15 LAB
ANION GAP SERPL CALCULATED.3IONS-SCNC: 10 MMOL/L
ATRIAL RATE: 62 BPM
BUN SERPL-MCNC: 59 MG/DL (ref 5–25)
CALCIUM SERPL-MCNC: 8.9 MG/DL (ref 8.4–10.2)
CHLORIDE SERPL-SCNC: 105 MMOL/L (ref 96–108)
CO2 SERPL-SCNC: 23 MMOL/L (ref 21–32)
CREAT SERPL-MCNC: 1.94 MG/DL (ref 0.6–1.3)
GFR SERPL CREATININE-BSD FRML MDRD: 26 ML/MIN/1.73SQ M
GLUCOSE SERPL-MCNC: 90 MG/DL (ref 65–140)
P AXIS: 36 DEGREES
POTASSIUM SERPL-SCNC: 4.3 MMOL/L (ref 3.5–5.3)
PR INTERVAL: 154 MS
QRS AXIS: -35 DEGREES
QRSD INTERVAL: 94 MS
QT INTERVAL: 422 MS
QTC INTERVAL: 428 MS
SODIUM SERPL-SCNC: 138 MMOL/L (ref 135–147)
T WAVE AXIS: 9 DEGREES
VENTRICULAR RATE: 62 BPM

## 2023-08-15 PROCEDURE — 93010 ELECTROCARDIOGRAM REPORT: CPT | Performed by: INTERNAL MEDICINE

## 2023-08-15 PROCEDURE — 93970 EXTREMITY STUDY: CPT | Performed by: SURGERY

## 2023-08-15 PROCEDURE — 80048 BASIC METABOLIC PNL TOTAL CA: CPT | Performed by: PHYSICIAN ASSISTANT

## 2023-08-15 PROCEDURE — 99239 HOSP IP/OBS DSCHRG MGMT >30: CPT | Performed by: PHYSICIAN ASSISTANT

## 2023-08-15 RX ORDER — FUROSEMIDE 20 MG/1
20 TABLET ORAL ONCE
Status: COMPLETED | OUTPATIENT
Start: 2023-08-15 | End: 2023-08-15

## 2023-08-15 RX ORDER — SACCHAROMYCES BOULARDII 250 MG
250 CAPSULE ORAL
Start: 2023-08-15

## 2023-08-15 RX ORDER — FUROSEMIDE 20 MG/1
20 TABLET ORAL AS NEEDED
Qty: 1 TABLET | Refills: 0 | Status: SHIPPED | OUTPATIENT
Start: 2023-08-15 | End: 2023-08-22 | Stop reason: SDUPTHER

## 2023-08-15 RX ADMIN — VENLAFAXINE 75 MG: 37.5 TABLET ORAL at 08:15

## 2023-08-15 RX ADMIN — ARIPIPRAZOLE 20 MG: 5 TABLET ORAL at 08:15

## 2023-08-15 RX ADMIN — SODIUM BICARBONATE 650 MG: 650 TABLET ORAL at 08:15

## 2023-08-15 RX ADMIN — B-COMPLEX W/ C & FOLIC ACID TAB 1 TABLET: TAB at 08:15

## 2023-08-15 RX ADMIN — PANTOPRAZOLE SODIUM 40 MG: 40 TABLET, DELAYED RELEASE ORAL at 05:00

## 2023-08-15 RX ADMIN — FUROSEMIDE 20 MG: 20 TABLET ORAL at 08:29

## 2023-08-15 RX ADMIN — HEPARIN SODIUM 5000 UNITS: 5000 INJECTION INTRAVENOUS; SUBCUTANEOUS at 05:00

## 2023-08-15 NOTE — DISCHARGE SUMMARY
[facility]  Discharge- [address] 1955, 74 y.o. female MRN: [id number]  Unit/Bed#: S -Anastacio Encounter: [id number]  Primary Care Provider: Gris Angel DO   Date and time admitted to hospital: 8/14/2023 11:00 AM    * Bilateral lower extremity edema  Assessment & Plan  · Bilateral pedal edema, left slightly more so than right. No reports of pain or trauma  · Doppler negative for DVT, but given prior history of DVT, could be element of venous stasis  · BNP negative, no history of heart failure. · 2D echo December 2022 with mild diastolic dysfunction and normal EF. Consider repeating echo nonurgently  · No obvious offending medication  · Albumin normal  · Does admit to eating out a lot suggestive of high salt diet but denies prolonged standing  · Recommend salt restriction, elevation and compression. lasix 20 mg IV x1 dose given on admission with notable improvement. We will give 1 more dose of Lasix 20 mg p.o. now and provide prescription to take at home as needed  · Follow-up with nephrology    Left-sided chest wall pain  Assessment & Plan  · Patient with sudden severe pain under her left breast which she described as feeling like a bruise on Friday. Pain is nearly gone at this time.   Patient notes that it was positional in nature suggesting musculoskeletal etiology  · No obvious injury rash to suggest zoster  · Low suspicion of cardiac event- troponins negative  · 24 hour tele unremarkable  · T/c repeat echo  · Currently with no hypoxia and stable heart rate low suspicion of PE--could consider VQ scan d/t d-dimer 1.65 but this is nonspecific    Stage 3 chronic kidney disease [facility])  Assessment & Plan  Lab Results   Component Value Date    EGFR 26 08/15/2023    EGFR 23 08/14/2023    EGFR 25 08/11/2023    CREATININE 1.94 (H) 08/15/2023    CREATININE 2.12 (H) 08/14/2023    CREATININE 1.98 (H) 08/11/2023   · Baseline CKD range approx 1.6-2, stable  · Follow-up with nephrology    History of DVT (deep vein thrombosis)  Assessment & Plan  · History of right lower extremity DVT in summer 2019 followed by right internal jugular DVT in 10/2019 for which she was changed to sc lovenox. This was stopped June 2023 by surg onc. · spoke to her outpatient oncology team --she was not felt to require lifelong anticoagulation as her DVTs were felt to be provoked by her cancer which is now cured    Anemia  Assessment & Plan  · Known history of anemia being followed by hematology--iron infusions through their office as needed  · Continue B12 and folate. · No reports of active bleeding or concern for sudden drop in hemoglobin    Medical Problems     Resolved Problems  Date Reviewed: 8/15/2023   None       Discharging Physician / Practitioner: Lorena Patel PA-C  PCP: Ruel Maurice DO  Admission Date:   Admission Orders (From admission, onward)     Ordered        08/14/23 1500  Place in Observation  Once            08/14/23 1358  INPATIENT ADMISSION  Once                      Discharge Date: 08/15/23    Consultations During Hospital Stay:  · none    Procedures Performed:   · LE doppler normal  · cxr normal    Significant Findings / Test Results:   · none    Incidental Findings:   · none    Test Results Pending at Discharge (will require follow up):   · none     Outpatient Tests Requested:  · Bmp  · T/c echo    Complications:  none    Reason for Admission: ankle swelling    Hospital Course: Alissa Romero is a 74 y.o. female patient with underlying history of previously successfully treated endometrial cancer, chronic anemia, prior DVTs in 2019, and chronic kidney disease who originally presented to the hospital on 8/14/2023 due to bilateral pedal edema. Doppler on admission was negative for acute DVT. Patient responded favorably to 1 dose of IV Lasix 20 mg and was given a prescription for Lasix 20 mg p.o. to be taken as needed.   Overall we suspected the source of her swelling was likely due to indiscriminate salt intake and possibly chronic venous stasis from prior clots in the setting of chronic kidney disease. In addition patient complained of self-limited left chest wall pain which appeared to be musculoskeletal in nature given that her pain was influenced by position and her troponins were negative. Suspicion for PE was very low in the setting of negative Doppler and normal vital signs and given her chronic kidney disease we did not pursue CT, nor did we feel a VQ scan would be useful at this time    Patient was feeling substantially better the following morning and was stable for discharge      Please see above list of diagnoses and related plan for additional information. Condition at Discharge: stable    Discharge Day Visit / Exam:   Subjective: Patient notes that her leg swelling is already much improved and she is asking if she can go home. She reports very minimal discomfort under her left breast which is influenced by position but is not even significant enough to take a Tylenol. She reiterates no difficulty breathing  Vitals: Blood Pressure: 109/62 (08/15/23 0759)  Pulse: 63 (08/15/23 0759)  Temperature: 97.8 °F (36.6 °C) (08/15/23 0759)  Temp Source: Oral (08/14/23 2215)  Respirations: 18 (08/15/23 0759)  SpO2: 99 % (08/15/23 0759)  Exam:   Physical Exam  Vitals reviewed. Constitutional:       General: She is not in acute distress. Appearance: Normal appearance. She is not ill-appearing, toxic-appearing or diaphoretic. Eyes:      General: No scleral icterus. Right eye: No discharge. Left eye: No discharge. Conjunctiva/sclera: Conjunctivae normal.   Cardiovascular:      Rate and Rhythm: Normal rate and regular rhythm. Heart sounds: No murmur heard. Pulmonary:      Effort: No respiratory distress. Breath sounds: No stridor. No wheezing or rhonchi. Chest:      Chest wall: No tenderness. Abdominal:      General: There is no distension. Palpations: Abdomen is soft. Tenderness: There is no abdominal tenderness. There is no guarding. Musculoskeletal:         General: No tenderness. Right lower leg: Edema present. Left lower leg: Edema present. Comments: Improved, mild bilateral ankle edema   Skin:     General: Skin is warm and dry. Coloration: Skin is not jaundiced or pale. Findings: No bruising, erythema, lesion or rash. Neurological:      Mental Status: She is alert. Mental status is at baseline. Comments: Awake alert interactive with no confusion noted   Psychiatric:         Mood and Affect: Mood normal.          Discussion with Family: Patient declined call to . Discharge instructions/Information to patient and family:   See after visit summary for information provided to patient and family. Provisions for Follow-Up Care:  See after visit summary for information related to follow-up care and any pertinent home health orders. Disposition:   Home    Planned Readmission: none     Discharge Statement:  I spent 35 minutes discharging the patient. This time was spent on the day of discharge. I had direct contact with the patient on the day of discharge. Greater than 50% of the total time was spent examining patient, answering all patient questions, arranging and discussing plan of care with patient as well as directly providing post-discharge instructions. Additional time then spent on discharge activities. Case discussed with nursing    Discharge Medications:  See after visit summary for reconciled discharge medications provided to patient and/or family.       **Please Note: This note may have been constructed using a voice recognition system**

## 2023-08-15 NOTE — ASSESSMENT & PLAN NOTE
· History of right lower extremity DVT in summer 2019 followed by right internal jugular DVT in 10/2019 for which she was changed to sc lovenox. This was stopped June 2023 by surg onc.   · spoke to her outpatient oncology team --she was not felt to require lifelong anticoagulation as her DVTs were felt to be provoked by her cancer which is now cured

## 2023-08-15 NOTE — ASSESSMENT & PLAN NOTE
· Patient with sudden severe pain under her left breast which she described as feeling like a bruise on Friday. Pain is nearly gone at this time.   Patient notes that it was positional in nature suggesting musculoskeletal etiology  · No obvious injury rash to suggest zoster  · Low suspicion of cardiac event- troponins negative  · 24 hour tele unremarkable  · T/c repeat echo  · Currently with no hypoxia and stable heart rate low suspicion of PE--could consider VQ scan d/t d-dimer 1.65 but this is nonspecific

## 2023-08-15 NOTE — DISCHARGE INSTR - AVS FIRST PAGE
Dear Dylon Khanna,     It was our pleasure to care for you here at [facility] [employer] It is our hope that we were always able to exceed the expected standards for your care during your stay. You were hospitalized due to ankle swelling. You were cared for on the 3rd floor by Sawyer Perdue PA-C under the service of Author MD Markie with the [facility] who covers for your primary care physician (PCP), Santos Son DO, while you were hospitalized. If you have any questions or concerns related to this hospitalization, you may contact us at [phone]. For follow up as well as any medication refills, we recommend that you follow up with your primary care physician. A registered nurse will reach out to you by phone within a few days after your discharge to answer any additional questions that you may have after going home. However, at this time we provide for you here, the most important instructions / recommendations at discharge:     Notable Medication Adjustments -   Lasix 20 mg once a day AS NEEDED if your legs swell significantly or you gain 2-3 lbs in 1 day or 5 lbs in a week  You can take tylenol, no NSAIDS, for chest wall muscular pain  Testing Required after Discharge -   You will need a BMP and possibly a repeat echocardiogram  ** Please contact your PCP to request testing orders for any of the testing recommended here **  Important follow up information -   Family doctor in about a week  Keep kidney doctor appt. Call for sooner appt if you are swelling up repeatedly  Other Instructions -   Low-sodium diet  Keep your legs elevated and wear compression stockings to reduce swelling and help with circulation.   Please review this entire after visit summary as additional general instructions including medication list, appointments, activity, diet, any pertinent wound care, and other additional recommendations from your care team that may be provided for you.       Sincerely,     April Benavidez PA-C

## 2023-08-15 NOTE — ASSESSMENT & PLAN NOTE
· Bilateral pedal edema, left slightly more so than right. No reports of pain or trauma  · Doppler negative for DVT, but given prior history of DVT, could be element of venous stasis  · BNP negative, no history of heart failure. · 2D echo December 2022 with mild diastolic dysfunction and normal EF. Consider repeating echo nonurgently  · No obvious offending medication  · Albumin normal  · Does admit to eating out a lot suggestive of high salt diet but denies prolonged standing  · Recommend salt restriction, elevation and compression. lasix 20 mg IV x1 dose given on admission with notable improvement.   We will give 1 more dose of Lasix 20 mg p.o. now and provide prescription to take at home as needed  · Follow-up with nephrology

## 2023-08-15 NOTE — ASSESSMENT & PLAN NOTE
Lab Results   Component Value Date    EGFR 26 08/15/2023    EGFR 23 08/14/2023    EGFR 25 08/11/2023    CREATININE 1.94 (H) 08/15/2023    CREATININE 2.12 (H) 08/14/2023    CREATININE 1.98 (H) 08/11/2023   · Baseline CKD range approx 1.6-2, stable  · Follow-up with nephrology

## 2023-08-16 LAB
ALBUMIN SERPL ELPH-MCNC: 3.6 G/DL (ref 3.2–5.1)
ALBUMIN SERPL ELPH-MCNC: 48 % (ref 48–70)
ALPHA1 GLOB SERPL ELPH-MCNC: 0.49 G/DL (ref 0.15–0.47)
ALPHA1 GLOB SERPL ELPH-MCNC: 6.5 % (ref 1.8–7)
ALPHA2 GLOB SERPL ELPH-MCNC: 0.88 G/DL (ref 0.42–1.04)
ALPHA2 GLOB SERPL ELPH-MCNC: 11.7 % (ref 5.9–14.9)
BETA GLOB ABNORMAL SERPL ELPH-MCNC: 0.43 G/DL (ref 0.31–0.57)
BETA1 GLOB SERPL ELPH-MCNC: 5.7 % (ref 4.7–7.7)
BETA2 GLOB SERPL ELPH-MCNC: 6.3 % (ref 3.1–7.9)
BETA2+GAMMA GLOB SERPL ELPH-MCNC: 0.47 G/DL (ref 0.2–0.58)
GAMMA GLOB ABNORMAL SERPL ELPH-MCNC: 1.64 G/DL (ref 0.4–1.66)
GAMMA GLOB SERPL ELPH-MCNC: 21.8 % (ref 6.9–22.3)
IGG/ALB SER: 0.92 {RATIO} (ref 1.1–1.8)
M PEAK ID 2: 3 %
M PROTEIN 1 MFR SERPL ELPH: 1.8 %
M PROTEIN 1 SERPL ELPH-MCNC: 0.14 G/DL
M PROTEIN 2 SERPL ELPH-MCNC: 0.23 G/DL
PROT PATTERN SERPL ELPH-IMP: ABNORMAL
PROT SERPL-MCNC: 7.5 G/DL (ref 6.4–8.2)

## 2023-08-16 PROCEDURE — 84165 PROTEIN E-PHORESIS SERUM: CPT | Performed by: PATHOLOGY

## 2023-08-17 ENCOUNTER — TRANSITIONAL CARE MANAGEMENT (OUTPATIENT)
Dept: FAMILY MEDICINE CLINIC | Facility: CLINIC | Age: 68
End: 2023-08-17

## 2023-08-17 DIAGNOSIS — N13.30 HYDRONEPHROSIS: ICD-10-CM

## 2023-08-17 DIAGNOSIS — N18.9 CHRONIC KIDNEY DISEASE, UNSPECIFIED CKD STAGE: ICD-10-CM

## 2023-08-17 DIAGNOSIS — N17.9 ACUTE KIDNEY INJURY (HCC): ICD-10-CM

## 2023-08-17 RX ORDER — VENLAFAXINE HYDROCHLORIDE 75 MG/1
CAPSULE, EXTENDED RELEASE ORAL
COMMUNITY
Start: 2023-07-24

## 2023-08-17 RX ORDER — SODIUM CHLORIDE 9 MG/ML
10 INJECTION INTRAVENOUS DAILY
Qty: 300 ML | Refills: 2 | Status: SHIPPED | OUTPATIENT
Start: 2023-08-17 | End: 2023-11-15

## 2023-08-18 ENCOUNTER — RA CDI HCC (OUTPATIENT)
Dept: OTHER | Facility: HOSPITAL | Age: 68
End: 2023-08-18

## 2023-08-18 NOTE — PROGRESS NOTES
[address] [facility] coding opportunities       Chart reviewed, no opportunity found: [id number]        Patients Insurance     Medicare Insurance: [employer]

## 2023-08-22 ENCOUNTER — OFFICE VISIT (OUTPATIENT)
Dept: FAMILY MEDICINE CLINIC | Facility: CLINIC | Age: 68
End: 2023-08-22
Payer: COMMERCIAL

## 2023-08-22 VITALS
BODY MASS INDEX: 27.21 KG/M2 | RESPIRATION RATE: 16 BRPM | WEIGHT: 135 LBS | HEIGHT: 59 IN | SYSTOLIC BLOOD PRESSURE: 122 MMHG | DIASTOLIC BLOOD PRESSURE: 78 MMHG | HEART RATE: 72 BPM | OXYGEN SATURATION: 97 %

## 2023-08-22 DIAGNOSIS — R60.0 BILATERAL LOWER EXTREMITY EDEMA: ICD-10-CM

## 2023-08-22 DIAGNOSIS — R26.81 UNSTEADY GAIT: ICD-10-CM

## 2023-08-22 DIAGNOSIS — Z76.89 ENCOUNTER FOR SUPPORT AND COORDINATION OF TRANSITION OF CARE: Primary | ICD-10-CM

## 2023-08-22 PROCEDURE — 99496 TRANSJ CARE MGMT HIGH F2F 7D: CPT | Performed by: FAMILY MEDICINE

## 2023-08-22 RX ORDER — FUROSEMIDE 20 MG/1
20 TABLET ORAL AS NEEDED
Qty: 30 TABLET | Refills: 0 | Status: SHIPPED | OUTPATIENT
Start: 2023-08-22

## 2023-08-22 NOTE — PROGRESS NOTES
Assessment & Plan     1. Encounter for support and coordination of transition of care  - reviewed hospitalization course   - had ECHO 12/2022 - with mild diastolic dysfunction and normal EF  - no longer with b/l LE edema - eRx for Lasix PRN given to pt   - repeat BMP later this week   - caution with increased Na in take in diet - pt aware and agreeable   - has an appt with Uro scheduled for 8/30/2023 t/c diverting tubes as her L-one has been causing her problems and the bag frequently leaks  - (+) feels unsteady on her feet and concerns for falling  - f/u after labs and advised to schedule AWV - pt aware and agreeable     2. Bilateral lower extremity edema  -     furosemide (LASIX) 20 mg tablet; Take 1 tablet (20 mg total) by mouth if needed (for increased leg swelling or weight gain >2 lbs in 1 day)  -     Basic metabolic panel; Future    3. Unsteady gait  -     Ambulatory Referral to Physical Therapy; Future         Subjective     Transitional Care Management Review:   Anahy Yanes is a 74 y.o. female here for TCM follow up. During the TCM phone call patient stated:  TCM Call     Date and time call was made  8/17/2023 10:35 AM    Hospital care reviewed  Records reviewed    Patient was hospitialized at  [address] [place] [address]    Date of Admission  08/14/23    Date of discharge  08/15/23    Diagnosis  Bilateral lower extremity edema    Disposition  Home    Were the patients medications reviewed and updated  Yes    Current Symptoms  None      TCM Call     Post hospital issues  None    Should patient be enrolled in anticoag monitoring? No    Scheduled for follow up? Yes    Patient refusal reason  Patient following with Oncology. If biopsy turns out to not be cancerous, patient will follow with us.     Patients specialists  Nephrologist    Referrals needed  None    Did you obtain your prescribed medications  Yes    Do you need help managing your prescriptions or medications  No    Is transportation to your appointment needed  No    I have advised the patient to call PCP with any new or worsening symptoms  Julio C Long, [address]  Family    The type of support provided  None    Do you have social support  Yes, as much as I need    Are you recieving any outpatient services  No    Are you recieving home care services  No    Types of home care services  Nurse visit    Are you using any community resources  No    Current waiver services  No    Have you fallen in the last 12 months  No    Interperter language line needed  No    Counseling  Patient    Counseling topics  Importance of RX compliance        HPI   - pt was admitted to [facility] from 8/14-15/2023 for evaluation and treatment of b/l LE edema    Hospital Course: Neeta Vickers is a 74 y.o. female patient with underlying history of previously successfully treated endometrial cancer, chronic anemia, prior DVTs in 2019, and chronic kidney disease who originally presented to the hospital on 8/14/2023 due to bilateral pedal edema. Doppler on admission was negative for acute DVT. Patient responded favorably to 1 dose of IV Lasix 20 mg and was given a prescription for Lasix 20 mg p.o. to be taken as needed. Overall we suspected the source of her swelling was likely due to indiscriminate salt intake and possibly chronic venous stasis from prior clots in the setting of chronic kidney disease. In addition patient complained of self-limited left chest wall pain which appeared to be musculoskeletal in nature given that her pain was influenced by position and her troponins were negative. Suspicion for PE was very low in the setting of negative Doppler and normal vital signs and given her chronic kidney disease we did not pursue CT, nor did we feel a VQ scan would be useful at this time.   Patient was feeling substantially better the following morning and was stable for discharge    - pt needs Lasix Rx  - no script was given for repeat labs  - states her b/l LE edema has resolved  - no longer with L-sided chest wall pain   - had ECHO 12/2022 - with mild diastolic dysfunction and normal EF  - has an appt with Uro scheduled for 8/30/2023 t/c diverting tubes as her L-one has been causing her problems and the bag frequently leaks  - (+) feels unsteady on her feet and concerns for falling      Review of Systems as per HPI     Objective     /78 (BP Location: Left arm, Patient Position: Sitting, Cuff Size: Standard)   Pulse 72   Resp 16   Ht 4' 11" (1.499 m)   Wt 61.2 kg (135 lb)   LMP  (LMP Unknown)   SpO2 97%   BMI 27.27 kg/m²      Physical Exam  Vitals reviewed. Constitutional:       General: She is not in acute distress. Appearance: Normal appearance. She is not ill-appearing, toxic-appearing or diaphoretic. HENT:      Head: Normocephalic and atraumatic. Right Ear: External ear normal.      Left Ear: External ear normal.      Nose: Nose normal.   Eyes:      General: No scleral icterus. Right eye: No discharge. Left eye: No discharge. Extraocular Movements: Extraocular movements intact. Conjunctiva/sclera: Conjunctivae normal.   Cardiovascular:      Rate and Rhythm: Normal rate and regular rhythm. Heart sounds: Normal heart sounds. No murmur heard. No friction rub. No gallop. Pulmonary:      Effort: Pulmonary effort is normal. No respiratory distress. Breath sounds: Normal breath sounds. No stridor. No wheezing, rhonchi or rales. Musculoskeletal:         General: Normal range of motion. Right lower leg: No edema. Left lower leg: No edema. Skin:     General: Skin is warm. Neurological:      General: No focal deficit present. Mental Status: She is alert and oriented to person, place, and time. Psychiatric:         Mood and Affect: Mood normal.         Behavior: Behavior normal.       BMI Counseling: Body mass index is 27.27 kg/m².  The BMI is above normal. Nutrition recommendations include 3-5 servings of fruits/vegetables daily. Exercise recommendations include exercising 3-5 times per week.        Medications have been reviewed by provider in current encounter    Julia Saba DO

## 2023-08-25 ENCOUNTER — APPOINTMENT (OUTPATIENT)
Dept: LAB | Facility: CLINIC | Age: 68
End: 2023-08-25
Payer: COMMERCIAL

## 2023-08-25 DIAGNOSIS — D47.2 MONOCLONAL GAMMOPATHY: ICD-10-CM

## 2023-08-25 DIAGNOSIS — R60.0 BILATERAL LOWER EXTREMITY EDEMA: ICD-10-CM

## 2023-08-25 DIAGNOSIS — Z86.718 HISTORY OF DVT (DEEP VEIN THROMBOSIS): ICD-10-CM

## 2023-08-25 DIAGNOSIS — D50.8 IRON DEFICIENCY ANEMIA SECONDARY TO INADEQUATE DIETARY IRON INTAKE: ICD-10-CM

## 2023-08-25 LAB
ANION GAP SERPL CALCULATED.3IONS-SCNC: 9 MMOL/L
BASOPHILS # BLD AUTO: 0.04 THOUSANDS/ÂΜL (ref 0–0.1)
BASOPHILS NFR BLD AUTO: 1 % (ref 0–1)
BUN SERPL-MCNC: 65 MG/DL (ref 5–25)
CALCIUM SERPL-MCNC: 8.3 MG/DL (ref 8.4–10.2)
CHLORIDE SERPL-SCNC: 107 MMOL/L (ref 96–108)
CO2 SERPL-SCNC: 22 MMOL/L (ref 21–32)
CREAT SERPL-MCNC: 2.27 MG/DL (ref 0.6–1.3)
EOSINOPHIL # BLD AUTO: 0.06 THOUSAND/ÂΜL (ref 0–0.61)
EOSINOPHIL NFR BLD AUTO: 1 % (ref 0–6)
ERYTHROCYTE [DISTWIDTH] IN BLOOD BY AUTOMATED COUNT: 13.2 % (ref 11.6–15.1)
FERRITIN SERPL-MCNC: 567 NG/ML (ref 11–307)
GFR SERPL CREATININE-BSD FRML MDRD: 21 ML/MIN/1.73SQ M
GLUCOSE P FAST SERPL-MCNC: 88 MG/DL (ref 65–99)
HCT VFR BLD AUTO: 28.9 % (ref 34.8–46.1)
HGB BLD-MCNC: 8.7 G/DL (ref 11.5–15.4)
IMM GRANULOCYTES # BLD AUTO: 0.06 THOUSAND/UL (ref 0–0.2)
IMM GRANULOCYTES NFR BLD AUTO: 1 % (ref 0–2)
IRON SATN MFR SERPL: 14 % (ref 15–50)
IRON SERPL-MCNC: 33 UG/DL (ref 50–212)
LYMPHOCYTES # BLD AUTO: 0.7 THOUSANDS/ÂΜL (ref 0.6–4.47)
LYMPHOCYTES NFR BLD AUTO: 8 % (ref 14–44)
MCH RBC QN AUTO: 28.6 PG (ref 26.8–34.3)
MCHC RBC AUTO-ENTMCNC: 30.1 G/DL (ref 31.4–37.4)
MCV RBC AUTO: 95 FL (ref 82–98)
MONOCYTES # BLD AUTO: 0.84 THOUSAND/ÂΜL (ref 0.17–1.22)
MONOCYTES NFR BLD AUTO: 10 % (ref 4–12)
NEUTROPHILS # BLD AUTO: 6.7 THOUSANDS/ÂΜL (ref 1.85–7.62)
NEUTS SEG NFR BLD AUTO: 79 % (ref 43–75)
NRBC BLD AUTO-RTO: 0 /100 WBCS
PLATELET # BLD AUTO: 240 THOUSANDS/UL (ref 149–390)
PMV BLD AUTO: 10 FL (ref 8.9–12.7)
POTASSIUM SERPL-SCNC: 4.1 MMOL/L (ref 3.5–5.3)
RBC # BLD AUTO: 3.04 MILLION/UL (ref 3.81–5.12)
SODIUM SERPL-SCNC: 138 MMOL/L (ref 135–147)
TIBC SERPL-MCNC: 233 UG/DL (ref 250–450)
UIBC SERPL-MCNC: 200 UG/DL (ref 155–355)
VIT B12 SERPL-MCNC: 362 PG/ML (ref 180–914)
WBC # BLD AUTO: 8.4 THOUSAND/UL (ref 4.31–10.16)

## 2023-08-25 PROCEDURE — 84165 PROTEIN E-PHORESIS SERUM: CPT

## 2023-08-25 PROCEDURE — 83521 IG LIGHT CHAINS FREE EACH: CPT

## 2023-08-25 PROCEDURE — 83540 ASSAY OF IRON: CPT

## 2023-08-25 PROCEDURE — 82607 VITAMIN B-12: CPT

## 2023-08-25 PROCEDURE — 85025 COMPLETE CBC W/AUTO DIFF WBC: CPT

## 2023-08-25 PROCEDURE — 82728 ASSAY OF FERRITIN: CPT

## 2023-08-25 PROCEDURE — 83550 IRON BINDING TEST: CPT

## 2023-08-25 PROCEDURE — 80048 BASIC METABOLIC PNL TOTAL CA: CPT

## 2023-08-25 PROCEDURE — 36415 COLL VENOUS BLD VENIPUNCTURE: CPT

## 2023-08-26 LAB
KAPPA LC FREE SER-MCNC: 126.2 MG/L (ref 3.3–19.4)
KAPPA LC FREE/LAMBDA FREE SER: 1.54 {RATIO} (ref 0.26–1.65)
LAMBDA LC FREE SERPL-MCNC: 82.1 MG/L (ref 5.7–26.3)

## 2023-08-28 LAB
ALBUMIN SERPL ELPH-MCNC: 3.35 G/DL (ref 3.2–5.1)
ALBUMIN SERPL ELPH-MCNC: 49.2 % (ref 48–70)
ALPHA1 GLOB SERPL ELPH-MCNC: 0.41 G/DL (ref 0.15–0.47)
ALPHA1 GLOB SERPL ELPH-MCNC: 6.1 % (ref 1.8–7)
ALPHA2 GLOB SERPL ELPH-MCNC: 0.78 G/DL (ref 0.42–1.04)
ALPHA2 GLOB SERPL ELPH-MCNC: 11.4 % (ref 5.9–14.9)
BETA GLOB ABNORMAL SERPL ELPH-MCNC: 0.37 G/DL (ref 0.31–0.57)
BETA1 GLOB SERPL ELPH-MCNC: 5.5 % (ref 4.7–7.7)
BETA2 GLOB SERPL ELPH-MCNC: 6.1 % (ref 3.1–7.9)
BETA2+GAMMA GLOB SERPL ELPH-MCNC: 0.41 G/DL (ref 0.2–0.58)
GAMMA GLOB ABNORMAL SERPL ELPH-MCNC: 1.48 G/DL (ref 0.4–1.66)
GAMMA GLOB SERPL ELPH-MCNC: 21.7 % (ref 6.9–22.3)
IGG/ALB SER: 0.97 {RATIO} (ref 1.1–1.8)
M PEAK ID 2: 5 %
M PROTEIN 1 MFR SERPL ELPH: 1.8 %
M PROTEIN 1 SERPL ELPH-MCNC: 0.12 G/DL
M PROTEIN 2 SERPL ELPH-MCNC: 0.34 G/DL
PROT PATTERN SERPL ELPH-IMP: ABNORMAL
PROT SERPL-MCNC: 6.8 G/DL (ref 6.4–8.4)

## 2023-08-28 PROCEDURE — 84165 PROTEIN E-PHORESIS SERUM: CPT | Performed by: PATHOLOGY

## 2023-08-30 ENCOUNTER — OFFICE VISIT (OUTPATIENT)
Dept: UROLOGY | Facility: AMBULATORY SURGERY CENTER | Age: 68
End: 2023-08-30
Payer: COMMERCIAL

## 2023-08-30 ENCOUNTER — TELEPHONE (OUTPATIENT)
Dept: NEPHROLOGY | Facility: CLINIC | Age: 68
End: 2023-08-30

## 2023-08-30 VITALS
DIASTOLIC BLOOD PRESSURE: 78 MMHG | HEIGHT: 59 IN | WEIGHT: 135 LBS | SYSTOLIC BLOOD PRESSURE: 122 MMHG | BODY MASS INDEX: 27.21 KG/M2 | RESPIRATION RATE: 18 BRPM

## 2023-08-30 DIAGNOSIS — C54.1 ENDOMETRIAL CANCER (HCC): Primary | Chronic | ICD-10-CM

## 2023-08-30 DIAGNOSIS — N17.9 AKI (ACUTE KIDNEY INJURY) (HCC): ICD-10-CM

## 2023-08-30 DIAGNOSIS — N13.5 URETERAL STRICTURE: ICD-10-CM

## 2023-08-30 DIAGNOSIS — N18.31 CHRONIC KIDNEY DISEASE, STAGE 3A (HCC): Primary | ICD-10-CM

## 2023-08-30 PROCEDURE — 99215 OFFICE O/P EST HI 40 MIN: CPT | Performed by: UROLOGY

## 2023-08-30 NOTE — LETTER
August 30, 2023     Rebeca Hoskins DO  [address] [address]    Patient: Neeta Vickers   YOB: 1955   Date of Visit: 8/30/2023       Dear Dr. Chavez Sanchez:    Thank you for referring Neeta Vickers to me for evaluation. Below are my notes for this consultation. If you have questions, please do not hesitate to call me. I look forward to following your patient along with you. Sincerely,        Brandon De Santiago MD        CC: MD Allyssa Miles CRNP Alwin Hutching, MD  8/30/2023  2:14 PM  Sign when Signing Visit  8/30/2023    Ragini Melendez  1955  [id number]        Assessment  History of endometrial carcinoma, status post hysterectomy, status post recurrence with chemoradiation treatment, bilateral ureteral strictures, nephrostomy tube dependent, chronic kidney disease      Discussion  I had a lengthy discussion as per below with the patient and her sister in the office today regarding her bilateral distal ureteral strictures which are likely secondary to radiation therapy. 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 Cara Shaver. We discussed options at length including exploratory laparotomy with an attempt to perform ileal conduit urinary diversion. We discussed the risk associated with this procedure including retroperitoneal scarring/fibrosis precluding surgery, risk of DVT/PE, anesthetic complication, bowel injury/obstruction, ileus, or even significant consequence such as postoperative mortality. At this time I will again readdress the case with Dr. Cara Shaver as well as        History of Present Illness  74 y.o. female with a history of endometrial carcinoma. Many years ago she underwent robot-assisted laparoscopic vaginal hysterectomy by Dr. Robert Aguilar. She then had recurrence and received chemoradiation. She developed bilateral ureteral strictures.   She has been managed with a combination of indwelling double-J ureteral stents as well as nephrostomy tubes. She has unfortunately failed management of the ureters with stents alone. She now appears to be nephrostomy tube dependent. She also previously underwent an open gastric bypass. I recently removed both stents in the operating room and performed retrograde pyelograms which showed narrowing of both ureters distally. She returns to the office today with her daughter. We had a lengthy discussion regarding the risks and benefits associated with exploratory laparotomy with ileal conduit urinary diversion. We discussed that it is possible that there could be so much inflammation and scarring within the retroperitoneum that surgery itself is not even possible. We discussed all of the potential complications associated with major surgery and also reviewed her history of DVT as well as the risk of progressive chronic kidney disease and even renal failure. .  This operation would place her at increased risk for repeat DVT/PE. AUA Symptom Score      Review of Systems  Review of Systems   Constitutional: Negative. HENT: Negative. Eyes: Negative. Respiratory: Negative. Cardiovascular: Negative. Gastrointestinal: Negative. Endocrine: Negative. Genitourinary:        Per HPI   Musculoskeletal: Negative. Skin: Negative. Allergic/Immunologic: Negative. Neurological: Negative. Hematological: Negative. Psychiatric/Behavioral: Negative.           Past Medical History  Past Medical History:   Diagnosis Date   • Anemia    • Anxiety    • Cancer ([address])     ENDOMETRIAL   • Chemotherapy induced neutropenia ([address]) 8/30/2019   • CKD (chronic kidney disease) stage 3, GFR 30-59 ml/min ([facility])    • COVID     Fall 2022   • Depression    • DVT (deep venous thrombosis) ([address]) 07/19/2019    RIGHT LEG   • Endometrial cancer ([address]) 12/2017   • GERD (gastroesophageal reflux disease)    • H/O gastric bypass    • Hard to intubate pt denies AS OF 7/25/23   • History of chemotherapy     started 12/2021endometrial cancer- done 12/23/22   • History of DVT in adulthood     RLE   • History of transfusion 04/13/2023   • Hyperglycemia     vx type 2 dm -- last assessed 4/1/14; resolved 11/7/17   • Hyperlipidemia    • Hypertension     in past- no meds at present   • Iron deficiency anemia     iron infusions weekly   • OAB (overactive bladder)    • Port-A-Cath in place    • Wears glasses        Past Social History  Past Surgical History:   Procedure Laterality Date   • ABDOMINAL SURGERY      GASTRIC BYPASS; 2001   • BREAST BIOPSY Right 06/28/2019    core biopsy; benign   • CHOLECYSTECTOMY      at the time of gastric bypass   • COLONOSCOPY     • CT NEEDLE BIOPSY LYMPH NODE  07/08/2019   • FL GUIDED CENTRAL VENOUS ACCESS DEVICE INSERTION  11/12/2019   • FL RETROGRADE PYELOGRAM  03/30/2023   • FL RETROGRADE PYELOGRAM  05/09/2023   • FL RETROGRADE PYELOGRAM  8/1/2023   • GASTRIC BYPASS     • HYSTERECTOMY Bilateral 2017    total abdominal with salpingo-oophorectomy   • IR NEPHROSTOMY TO NEPHROURETERAL STENT  06/09/2022   • IR NEPHROSTOMY TO NEPHROURETERAL STENT  12/20/2022   • IR NEPHROSTOMY TO NEPHROURETERAL STENT  8/8/2023   • IR NEPHROSTOMY TUBE CHECK/CHANGE/REPOSITION/REINSERTION/UPSIZE  05/27/2022   • IR NEPHROSTOMY TUBE PLACEMENT  07/26/2019   • IR NEPHROSTOMY TUBE PLACEMENT  05/27/2023   • IR NEPHROURETERAL STENT CHECK/CHANGE/REPOSITION  04/06/2021   • IR NEPHROURETERAL STENT CHECK/CHANGE/REPOSITION  06/04/2021   • IR NEPHROURETERAL STENT CHECK/CHANGE/REPOSITION  09/03/2021   • IR NEPHROURETERAL STENT CHECK/CHANGE/REPOSITION  12/07/2021   • IR NEPHROURETERAL STENT CHECK/CHANGE/REPOSITION  03/08/2022   • IR NEPHROURETERAL STENT CHECK/CHANGE/REPOSITION  05/10/2022   • IR NEPHROURETERAL STENT CHECK/CHANGE/REPOSITION  09/19/2022   • IR PICC LINE  09/27/2019   • IR PORT PLACEMENT  07/26/2019   • IR PORT PLACEMENT     • IR PORT REMOVAL  09/20/2019   • OOPHORECTOMY Bilateral 2017   • CO CYSTO BLADDER W/URETERAL CATHETERIZATION Right 03/30/2023    Procedure: CYSTOSCOPY RETROGRADE PYELOGRAM WITH exchange of STENT URETERAL;  Surgeon: Carlos Cook MD;  [facility];  Service: Urology   • CO CYSTO BLADDER W/URETERAL CATHETERIZATION Bilateral 05/09/2023    Procedure: CYSTOSCOPY, BILATERAL RETROGRADE PYELOGRAM, WITH LEFT INSERTION STENT URETERAL, RIGHT URTERAL STENT EXCHANGE;  Surgeon: Gurvinder Soto MD;  [facility];  Service: Urology   • CO CYSTO BLADDER W/URETERAL CATHETERIZATION Bilateral 8/1/2023    Procedure: CYSTOSCOPY BILATERAL RETROGRADE  WITH REMOVAL BILATERAL  STENT URETERAL;  Surgeon: Carlos Cook MD;  [facility];  Service: Urology   • CO CYSTO W/INSERT URETERAL STENT Right 03/30/2023    Procedure: EXCHANGE STENT URETERAL;  Surgeon: Carlos Cook MD;  [facility];  Service: Urology   • CO INSJ TUNNELED CTR VAD W/SUBQ PORT AGE 5 YR/> Left 11/12/2019    Procedure: INSERTION VENOUS PORT ( PORT-A-CATH) IR;  Surgeon: Coretta Muir DO;  [facility];  Service: Interventional Radiology   • CO LAPS ABD PRTM&OMENTUM DX W/WO SPEC BR/WA [facility] N/A 12/19/2017    Procedure: LAPAROSCOPY DIAGNOSTIC;  Surgeon: Matisa Shah MD;  [facility];  Service: Gynecology Oncology   • CO LAPS W/RAD HYST W/BILAT LMPHADEC RMVL TUBE/OVARY N/A 12/19/2017    Procedure: HYSTERECTOMY LAPAROSCOPIC TOTAL ([facility]) W/ ROBOTICS; BILATERAL SALPINGOOPHERECTOMY; LYMPH NODE DISSECTION; lysis of adhesions;  Surgeon: Matias Shah MD;  [facility];  Service: Gynecology Oncology   • REMOVAL URETERAL STENT Bilateral 8/1/2023    Procedure: REMOVAL STENT URETERAL;  Surgeon: Carlos Cook MD;  [facility];  Service: Urology   • TONSILLECTOMY     • US GUIDED BREAST BIOPSY RIGHT COMPLETE Right 06/28/2019       Past Family History  Family History   Problem Relation Age of Onset   • Hyperlipidemia Mother    • Heart disease Mother    • Ovarian cancer Mother 48   • Colon cancer Mother    • Lymphoma Father    • Breast cancer Sister 61   • No Known Problems Brother    • No Known Problems Brother    • No Known Problems Maternal Grandmother    • Bone cancer Maternal Grandfather    • Uterine cancer Paternal Grandmother    • No Known Problems Paternal Grandfather    • No Known Problems Maternal Aunt    • No Known Problems Maternal Aunt    • No Known Problems Maternal Aunt    • No Known Problems Maternal Aunt    • No Known Problems Paternal Aunt    • No Known Problems Paternal Aunt    • No Known Problems Paternal Aunt    • No Known Problems Paternal Aunt        Past Social history  Social History     Socioeconomic History   • Marital status: Single     Spouse name: Not on file   • Number of children: Not on file   • Years of education: Not on file   • Highest education level: Not on file   Occupational History   • Not on file   Tobacco Use   • Smoking status: Never   • Smokeless tobacco: Never   Vaping Use   • Vaping Use: Never used   Substance and Sexual Activity   • Alcohol use: Never   • Drug use: Never   • Sexual activity: Not Currently   Other Topics Concern   • Not on file   Social History Narrative   • Not on file     Social Determinants of Health     Financial Resource Strain: Not on file   Food Insecurity: No Food Insecurity (5/30/2023)    Hunger Vital Sign    • Worried About Running Out of Food in the Last Year: Never true    • Ran Out of Food in the Last Year: Never true   Transportation Needs: No Transportation Needs (5/30/2023)    PRAPARE - Transportation    • Lack of Transportation (Medical): No    • Lack of Transportation (Non-Medical):  No   Physical Activity: Not on file   Stress: Not on file   Social Connections: Not on file   Intimate Partner Violence: Not on file   Housing Stability: Low Risk  (5/30/2023)    Housing Stability Vital Sign    • Unable to Pay for Housing in the Last Year: No    • Number of Places Lived in the Last Year: 1    • Unstable Housing in the Last Year: No       Current Medications  Current Outpatient Medications   Medication Sig Dispense Refill   • ARIPiprazole (ABILIFY) 20 MG tablet Take 20 mg by mouth every morning     • BD PosiFlush 0.9 % SOLN      • Calcium-Magnesium-Vitamin D (CALCIUM 500 PO) Take 1 capsule by mouth daily at bedtime     • cholecalciferol (VITAMIN D3) 1,000 units tablet Take 4 tablets (4,000 Units total) by mouth daily (Patient taking differently: Take 4,000 Units by mouth daily at bedtime) 90 tablet 0   • Cyanocobalamin (VITAMIN B12 PO) Take 1 capsule by mouth daily after lunch     • dronabinol (MARINOL) 2.5 mg capsule Take 1 capsule (2.5 mg total) by mouth 2 (two) times a day before meals 30 capsule 0   • folic acid (FOLVITE) 1 mg tablet Take 1 tablet (1 mg total) by mouth daily (Patient taking differently: Take 1 mg by mouth daily at bedtime)  0   • furosemide (LASIX) 20 mg tablet Take 1 tablet (20 mg total) by mouth if needed (for increased leg swelling or weight gain >2 lbs in 1 day) 30 tablet 0   • Gemtesa 75 MG TABS TAKE 75 MG BY MOUTH IN THE MORNING (Patient taking differently: Take 1 capsule by mouth every morning) 90 tablet 2   • Multiple Vitamin (MULTIVITAMIN) tablet Take 1 tablet by mouth every morning     • omeprazole (PriLOSEC) 20 mg delayed release capsule Take 20 mg by mouth every morning     • oxybutynin (DITROPAN XL) 15 MG 24 hr tablet Take 1 tablet (15 mg total) by mouth daily at bedtime 90 tablet 3   • saccharomyces boulardii (FLORASTOR) 250 mg capsule Take 1 capsule (250 mg total) by mouth daily after lunch     • senna (SENOKOT) 8.6 MG tablet Take 1 tablet (8.6 mg total) by mouth 2 (two) times a day as needed for constipation 60 tablet 0   • sodium bicarbonate 650 mg tablet Take 650 mg by mouth 3 (three) times a day     • sodium chloride, PF, 0.9 % 10 mL by Intracatheter route daily Intracatheter flushing daily.  May substitute prefilled syringe with normal saline 10 mL vials, 10 mL syringes, and 18 g blunt needles 300 mL 2   • venlafaxine (EFFEXOR-XR) 75 mg 24 hr capsule TAKE 3 CAPS DAILY       No current facility-administered medications for this visit. Allergies  Allergies   Allergen Reactions   • Cephalosporins Rash     Which Cephalosporin reaction was to not specified; however, has tolerated Amoxicillin, Cefazolin, and Cefepime       Past Medical History, Social History, Family History, medications and allergies were reviewed. Vitals  Vitals:    08/30/23 1334   BP: 122/78   BP Location: Left arm   Patient Position: Sitting   Cuff Size: Standard   Resp: 18   Weight: 61.2 kg (135 lb)   Height: 4' 11" (1.499 m)       Physical Exam  Physical Exam  Examination she is in no acute distress. She does appear somewhat frail and elderly appearing. Her abdomen is soft nontender nondistended. An upper midline scar is appreciated. Laparoscopic scars noted. Bruising in the lower abdomen from Lovenox appreciated. Skin is warm. Extremities without edema. Gait is slow. Bilateral nephrostomy tubes in place.   Affect normal    Results  No results found for: "PSA"  Lab Results   Component Value Date    GLUCOSE 219 (H) 12/19/2017    CALCIUM 8.3 (L) 08/25/2023     10/27/2017    K 4.1 08/25/2023    CO2 22 08/25/2023     08/25/2023    BUN 65 (H) 08/25/2023    CREATININE 2.27 (H) 08/25/2023     Lab Results   Component Value Date    WBC 8.40 08/25/2023    HGB 8.7 (L) 08/25/2023    HCT 28.9 (L) 08/25/2023    MCV 95 08/25/2023     08/25/2023         Office Urine Dip  No results found for this or any previous visit (from the past 1 hour(s)).]

## 2023-08-30 NOTE — PROGRESS NOTES
8/30/2023    Ragini Melendez  1955  [id number]        Assessment  History of endometrial carcinoma, status post hysterectomy, status post recurrence with chemoradiation treatment, bilateral ureteral strictures, nephrostomy tube dependent, chronic kidney disease      Discussion  I had a lengthy discussion as per below with the patient and her sister in the office today regarding her bilateral distal ureteral strictures which are likely secondary to radiation therapy. 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 Antionette Gee. We discussed options at length including exploratory laparotomy with an attempt to perform ileal conduit urinary diversion. We discussed the risk associated with this procedure including retroperitoneal scarring/fibrosis precluding surgery, risk of DVT/PE, anesthetic complication, bowel injury/obstruction, ileus, or even significant consequence such as postoperative mortality. At this time I will again readdress the case with Dr. Antionette Gee as well as        History of Present Illness  74 y.o. female with a history of endometrial carcinoma. Many years ago she underwent robot-assisted laparoscopic vaginal hysterectomy by Dr. Kira Esposito. She then had recurrence and received chemoradiation. She developed bilateral ureteral strictures. She has been managed with a combination of indwelling double-J ureteral stents as well as nephrostomy tubes. She has unfortunately failed management of the ureters with stents alone. She now appears to be nephrostomy tube dependent. She also previously underwent an open gastric bypass. I recently removed both stents in the operating room and performed retrograde pyelograms which showed narrowing of both ureters distally. She returns to the office today with her daughter. We had a lengthy discussion regarding the risks and benefits associated with exploratory laparotomy with ileal conduit urinary diversion. We discussed that it is possible that there could be so much inflammation and scarring within the retroperitoneum that surgery itself is not even possible. We discussed all of the potential complications associated with major surgery and also reviewed her history of DVT as well as the risk of progressive chronic kidney disease and even renal failure. .  This operation would place her at increased risk for repeat DVT/PE. AUA Symptom Score      Review of Systems  Review of Systems   Constitutional: Negative. HENT: Negative. Eyes: Negative. Respiratory: Negative. Cardiovascular: Negative. Gastrointestinal: Negative. Endocrine: Negative. Genitourinary:        Per HPI   Musculoskeletal: Negative. Skin: Negative. Allergic/Immunologic: Negative. Neurological: Negative. Hematological: Negative. Psychiatric/Behavioral: Negative.           Past Medical History  Past Medical History:   Diagnosis Date   • Anemia    • Anxiety    • Cancer ([address])     ENDOMETRIAL   • Chemotherapy induced neutropenia ([address]) 8/30/2019   • CKD (chronic kidney disease) stage 3, GFR 30-59 ml/min ([facility])    • COVID     Fall 2022   • Depression    • DVT (deep venous thrombosis) ([address]) 07/19/2019    RIGHT LEG   • Endometrial cancer ([address]) 12/2017   • GERD (gastroesophageal reflux disease)    • H/O gastric bypass    • Hard to intubate     pt denies AS OF 7/25/23   • History of chemotherapy     started 12/2021endometrial cancer- done 12/23/22   • History of DVT in adulthood     RLE   • History of transfusion 04/13/2023   • Hyperglycemia     vx type 2 dm -- last assessed 4/1/14; resolved 11/7/17   • Hyperlipidemia    • Hypertension     in past- no meds at present   • Iron deficiency anemia     iron infusions weekly   • OAB (overactive bladder)    • Port-A-Cath in place    • Wears glasses        Past Social History  Past Surgical History:   Procedure Laterality Date   • ABDOMINAL SURGERY      GASTRIC BYPASS; 2001   • BREAST BIOPSY Right 06/28/2019    core biopsy; benign   • CHOLECYSTECTOMY      at the time of gastric bypass   • COLONOSCOPY     • CT NEEDLE BIOPSY LYMPH NODE  07/08/2019   • FL GUIDED CENTRAL VENOUS ACCESS DEVICE INSERTION  11/12/2019   • FL RETROGRADE PYELOGRAM  03/30/2023   • FL RETROGRADE PYELOGRAM  05/09/2023   • FL RETROGRADE PYELOGRAM  8/1/2023   • GASTRIC BYPASS     • HYSTERECTOMY Bilateral 2017    total abdominal with salpingo-oophorectomy   • IR NEPHROSTOMY TO NEPHROURETERAL STENT  06/09/2022   • IR NEPHROSTOMY TO NEPHROURETERAL STENT  12/20/2022   • IR NEPHROSTOMY TO NEPHROURETERAL STENT  8/8/2023   • IR NEPHROSTOMY TUBE CHECK/CHANGE/REPOSITION/REINSERTION/UPSIZE  05/27/2022   • IR NEPHROSTOMY TUBE PLACEMENT  07/26/2019   • IR NEPHROSTOMY TUBE PLACEMENT  05/27/2023   • IR NEPHROURETERAL STENT CHECK/CHANGE/REPOSITION  04/06/2021   • IR NEPHROURETERAL STENT CHECK/CHANGE/REPOSITION  06/04/2021   • IR NEPHROURETERAL STENT CHECK/CHANGE/REPOSITION  09/03/2021   • IR NEPHROURETERAL STENT CHECK/CHANGE/REPOSITION  12/07/2021   • IR NEPHROURETERAL STENT CHECK/CHANGE/REPOSITION  03/08/2022   • IR NEPHROURETERAL STENT CHECK/CHANGE/REPOSITION  05/10/2022   • IR NEPHROURETERAL STENT CHECK/CHANGE/REPOSITION  09/19/2022   • IR PICC LINE  09/27/2019   • IR PORT PLACEMENT  07/26/2019   • IR PORT PLACEMENT     • IR PORT REMOVAL  09/20/2019   • OOPHORECTOMY Bilateral 2017   • ND CYSTO BLADDER W/URETERAL CATHETERIZATION Right 03/30/2023    Procedure: CYSTOSCOPY RETROGRADE PYELOGRAM WITH exchange of STENT URETERAL;  Surgeon: Preeti Carrasco MD;  [facility];  Service: Urology   • ND CYSTO BLADDER W/URETERAL CATHETERIZATION Bilateral 05/09/2023    Procedure: CYSTOSCOPY, BILATERAL RETROGRADE PYELOGRAM, WITH LEFT INSERTION STENT URETERAL, RIGHT [address];  Surgeon: Justino Brown MD;  [facility];  Service: Urology   • ND CYSTO BLADDER W/URETERAL CATHETERIZATION Bilateral 8/1/2023 Procedure: CYSTOSCOPY BILATERAL RETROGRADE  WITH REMOVAL BILATERAL  STENT URETERAL;  Surgeon: Carlos Cook MD;  [facility];  Service: Urology   • WY CYSTO W/INSERT URETERAL STENT Right 03/30/2023    Procedure: EXCHANGE STENT URETERAL;  Surgeon: Carlos Cook MD;  [facility];  Service: Urology   • WY INSJ TUNNELED CTR VAD W/SUBQ PORT AGE 5 YR/> Left 11/12/2019    Procedure: INSERTION VENOUS PORT ( PORT-A-CATH) IR;  Surgeon: Coretta Muir DO;  Location: [facility];  Service: Interventional Radiology   • WY LAPS ABD PRTM&OMENTUM DX W/WO [facility] BR/WA [address] [facility] N/A 12/19/2017    Procedure: LAPAROSCOPY DIAGNOSTIC;  Surgeon: Matias Shah MD;  [facility];  Service: Gynecology Oncology   • WY LAPS W/RAD HYST W/BILAT LMPHADEC RMVL TUBE/OVARY N/A 12/19/2017    Procedure: HYSTERECTOMY LAPAROSCOPIC TOTAL ([address] [facility]) W/ ROBOTICS; BILATERAL SALPINGOOPHERECTOMY; LYMPH NODE DISSECTION; lysis of adhesions;  Surgeon: Matias hSah MD;  [facility];  Service: Gynecology Oncology   • REMOVAL URETERAL STENT Bilateral 8/1/2023    Procedure: REMOVAL STENT URETERAL;  Surgeon: Carlos Cook MD;  [facility];  Service: Urology   • TONSILLECTOMY     • US GUIDED BREAST BIOPSY RIGHT COMPLETE Right 06/28/2019       Past Family History  Family History   Problem Relation Age of Onset   • Hyperlipidemia Mother    • Heart disease Mother    • Ovarian cancer Mother 48   • Colon cancer Mother    • Lymphoma Father    • Breast cancer Sister 61   • No Known Problems Brother    • No Known Problems Brother    • No Known Problems Maternal Grandmother    • Bone cancer Maternal Grandfather    • Uterine cancer Paternal Grandmother    • No Known Problems Paternal Grandfather    • No Known Problems Maternal Aunt    • No Known Problems Maternal Aunt    • No Known Problems Maternal Aunt    • No Known Problems Maternal Aunt    • No Known Problems Paternal Aunt    • No Known Problems Paternal Aunt    • No Known Problems Paternal Aunt    • No Known Problems Paternal Aunt        Past Social history  Social History     Socioeconomic History   • Marital status: Single     Spouse name: Not on file   • Number of children: Not on file   • Years of education: Not on file   • Highest education level: Not on file   Occupational History   • Not on file   Tobacco Use   • Smoking status: Never   • Smokeless tobacco: Never   Vaping Use   • Vaping Use: Never used   Substance and Sexual Activity   • Alcohol use: Never   • Drug use: Never   • Sexual activity: Not Currently   Other Topics Concern   • Not on file   Social History Narrative   • Not on file     Social Determinants of Health     Financial Resource Strain: Not on file   Food Insecurity: No Food Insecurity (5/30/2023)    Hunger Vital Sign    • Worried About Running Out of Food in the Last Year: Never true    • Ran Out of Food in the Last Year: Never true   Transportation Needs: No Transportation Needs (5/30/2023)    PRAPARE - Transportation    • Lack of Transportation (Medical): No    • Lack of Transportation (Non-Medical):  No   Physical Activity: Not on file   Stress: Not on file   Social Connections: Not on file   Intimate Partner Violence: Not on file   Housing Stability: Low Risk  (5/30/2023)    Housing Stability Vital Sign    • Unable to Pay for Housing in the Last Year: No    • Number of Places Lived in the Last Year: 1    • Unstable Housing in the Last Year: No       Current Medications  Current Outpatient Medications   Medication Sig Dispense Refill   • ARIPiprazole (ABILIFY) 20 MG tablet Take 20 mg by mouth every morning     • BD PosiFlush 0.9 % SOLN      • Calcium-Magnesium-Vitamin D (CALCIUM 500 PO) Take 1 capsule by mouth daily at bedtime     • cholecalciferol (VITAMIN D3) 1,000 units tablet Take 4 tablets (4,000 Units total) by mouth daily (Patient taking differently: Take 4,000 Units by mouth daily at bedtime) 90 tablet 0   • Cyanocobalamin (VITAMIN B12 PO) Take 1 capsule by mouth daily after lunch     • dronabinol (MARINOL) 2.5 mg capsule Take 1 capsule (2.5 mg total) by mouth 2 (two) times a day before meals 30 capsule 0   • folic acid (FOLVITE) 1 mg tablet Take 1 tablet (1 mg total) by mouth daily (Patient taking differently: Take 1 mg by mouth daily at bedtime)  0   • furosemide (LASIX) 20 mg tablet Take 1 tablet (20 mg total) by mouth if needed (for increased leg swelling or weight gain >2 lbs in 1 day) 30 tablet 0   • Gemtesa 75 MG TABS TAKE 75 MG BY MOUTH IN THE MORNING (Patient taking differently: Take 1 capsule by mouth every morning) 90 tablet 2   • Multiple Vitamin (MULTIVITAMIN) tablet Take 1 tablet by mouth every morning     • omeprazole (PriLOSEC) 20 mg delayed release capsule Take 20 mg by mouth every morning     • oxybutynin (DITROPAN XL) 15 MG 24 hr tablet Take 1 tablet (15 mg total) by mouth daily at bedtime 90 tablet 3   • saccharomyces boulardii (FLORASTOR) 250 mg capsule Take 1 capsule (250 mg total) by mouth daily after lunch     • senna (SENOKOT) 8.6 MG tablet Take 1 tablet (8.6 mg total) by mouth 2 (two) times a day as needed for constipation 60 tablet 0   • sodium bicarbonate 650 mg tablet Take 650 mg by mouth 3 (three) times a day     • sodium chloride, PF, 0.9 % 10 mL by Intracatheter route daily Intracatheter flushing daily. May substitute prefilled syringe with normal saline 10 mL vials, 10 mL syringes, and 18 g blunt needles 300 mL 2   • venlafaxine (EFFEXOR-XR) 75 mg 24 hr capsule TAKE 3 CAPS DAILY       No current facility-administered medications for this visit. Allergies  Allergies   Allergen Reactions   • Cephalosporins Rash     Which Cephalosporin reaction was to not specified; however, has tolerated Amoxicillin, Cefazolin, and Cefepime       Past Medical History, Social History, Family History, medications and allergies were reviewed.     Vitals  Vitals:    08/30/23 1334   BP: 122/78   BP Location: Left arm Patient Position: Sitting   Cuff Size: Standard   Resp: 18   Weight: 61.2 kg (135 lb)   Height: 4' 11" (1.499 m)       Physical Exam  Physical Exam  Examination she is in no acute distress. She does appear somewhat frail and elderly appearing. Her abdomen is soft nontender nondistended. An upper midline scar is appreciated. Laparoscopic scars noted. Bruising in the lower abdomen from Lovenox appreciated. Skin is warm. Extremities without edema. Gait is slow. Bilateral nephrostomy tubes in place.   Affect normal    Results  No results found for: "PSA"  Lab Results   Component Value Date    GLUCOSE 219 (H) 12/19/2017    CALCIUM 8.3 (L) 08/25/2023     10/27/2017    K 4.1 08/25/2023    CO2 22 08/25/2023     08/25/2023    BUN 65 (H) 08/25/2023    CREATININE 2.27 (H) 08/25/2023     Lab Results   Component Value Date    WBC 8.40 08/25/2023    HGB 8.7 (L) 08/25/2023    HCT 28.9 (L) 08/25/2023    MCV 95 08/25/2023     08/25/2023         Office Urine Dip  No results found for this or any previous visit (from the past 1 hour(s)).]

## 2023-08-30 NOTE — TELEPHONE ENCOUNTER
Patient called stating PCP is concerned with her rise in creatinine based on 8/25 labs and would like Dr. Boykin Opitz to take a look.

## 2023-08-30 NOTE — TELEPHONE ENCOUNTER
Follow-up CKD. She has history of nephrotic range proteinuria in setting of previous Avastin use. She also has history of bilateral ureteral strictures due to history of endometrial carcinoma previously treated with chemoradiation. She failed ureteral stents and now has bilateral nephrostomy tubes. Serum creatinine has been running around 1.8-2.0 since recent issues with obstructive uropathy and placement of bilateral nephrostomy tubes. She continues to have good urinary output in both her nephrostomy tubes. She is currently asymptomatic. She was seen in the hospital earlier this month with lower extremity swelling and was treated with diuretics. She has not taken diuretics in the last few days. She was advised to continue oral hydration. She was advised to check BMP with urine protein assessment in 1 week.     Lab Results   Component Value Date    SODIUM 138 08/25/2023    K 4.1 08/25/2023     08/25/2023    CO2 22 08/25/2023    BUN 65 (H) 08/25/2023    CREATININE 2.27 (H) 08/25/2023    GLUC 90 08/15/2023    CALCIUM 8.3 (L) 08/25/2023

## 2023-09-06 ENCOUNTER — EVALUATION (OUTPATIENT)
Dept: PHYSICAL THERAPY | Facility: CLINIC | Age: 68
End: 2023-09-06
Payer: COMMERCIAL

## 2023-09-06 ENCOUNTER — APPOINTMENT (OUTPATIENT)
Dept: LAB | Facility: CLINIC | Age: 68
End: 2023-09-06
Payer: COMMERCIAL

## 2023-09-06 ENCOUNTER — TELEPHONE (OUTPATIENT)
Dept: NEPHROLOGY | Facility: CLINIC | Age: 68
End: 2023-09-06

## 2023-09-06 DIAGNOSIS — N18.31 CHRONIC KIDNEY DISEASE, STAGE 3A (HCC): ICD-10-CM

## 2023-09-06 DIAGNOSIS — N17.9 AKI (ACUTE KIDNEY INJURY) (HCC): ICD-10-CM

## 2023-09-06 DIAGNOSIS — R26.81 UNSTEADY GAIT: ICD-10-CM

## 2023-09-06 LAB
ANION GAP SERPL CALCULATED.3IONS-SCNC: 8 MMOL/L
BUN SERPL-MCNC: 53 MG/DL (ref 5–25)
CALCIUM SERPL-MCNC: 8.8 MG/DL (ref 8.4–10.2)
CHLORIDE SERPL-SCNC: 108 MMOL/L (ref 96–108)
CO2 SERPL-SCNC: 23 MMOL/L (ref 21–32)
CREAT SERPL-MCNC: 2.11 MG/DL (ref 0.6–1.3)
CREAT UR-MCNC: 23.8 MG/DL
CREAT UR-MCNC: 23.8 MG/DL
GFR SERPL CREATININE-BSD FRML MDRD: 23 ML/MIN/1.73SQ M
GLUCOSE P FAST SERPL-MCNC: 82 MG/DL (ref 65–99)
MICROALBUMIN UR-MCNC: 126.1 MG/L
MICROALBUMIN/CREAT 24H UR: 530 MG/G CREATININE (ref 0–30)
POTASSIUM SERPL-SCNC: 4.4 MMOL/L (ref 3.5–5.3)
PROT UR-MCNC: 61 MG/DL
PROT/CREAT UR: 2.56 MG/G{CREAT} (ref 0–0.1)
SODIUM SERPL-SCNC: 139 MMOL/L (ref 135–147)

## 2023-09-06 PROCEDURE — 97161 PT EVAL LOW COMPLEX 20 MIN: CPT | Performed by: PHYSICAL THERAPIST

## 2023-09-06 PROCEDURE — 82043 UR ALBUMIN QUANTITATIVE: CPT

## 2023-09-06 PROCEDURE — 97110 THERAPEUTIC EXERCISES: CPT | Performed by: PHYSICAL THERAPIST

## 2023-09-06 PROCEDURE — 36415 COLL VENOUS BLD VENIPUNCTURE: CPT

## 2023-09-06 PROCEDURE — 82570 ASSAY OF URINE CREATININE: CPT

## 2023-09-06 PROCEDURE — 80048 BASIC METABOLIC PNL TOTAL CA: CPT

## 2023-09-06 PROCEDURE — 84156 ASSAY OF PROTEIN URINE: CPT

## 2023-09-06 NOTE — TELEPHONE ENCOUNTER
----- Message from Marley Couch MD sent at 9/6/2023  3:15 PM EDT -----  Please let the patient know that creatinine is down to 2.1 and would not make any changes at this time. She should continue to do oral hydration at home. I will discuss more at upcoming office visit. Thank you.

## 2023-09-06 NOTE — PROGRESS NOTES
PT Evaluation     Today's date: 2023  Patient name: Salma Barajas  : 1955  MRN: [id number]  Referring provider: Lloyd Valenzuela DO  Dx:   Encounter Diagnosis     ICD-10-CM    1. Unsteady gait  R26.81 Ambulatory Referral to Physical Therapy                     Assessment  Assessment details: Salma Barjaas is a 79 y.o. female who presents with decreased strength, ambulatory dysfunction, and balance dysfunction. Due to these impairments, patient has difficulty performing a/iadls and recreational activities. Patient's clinical presentation is consistent with their referring diagnosis of unsteady gait. Patient would benefit from skilled physical therapy to address their aforementioned impairments, improve their level of function and to improve their overall quality of life. Impairments: impaired balance, impaired physical strength and lacks appropriate home exercise program  Understanding of Dx/Px/POC: good   Prognosis: good    Goals  Short term goals - to be achieved in 4 weeks:     Increase strength by 1/2 grade. Balance will be improved as indicated by tandem stance of at least 15 seconds. Long term goals - to be achieved by discharge:    Ambulation is improved to maximal level of function. Stair climbing is improved to maximal level of function. IADL performance in related activities is improved to maximal level of function. Performance in related household activities is improved to maximal level of function.      Plan  Planned therapy interventions: neuromuscular re-education, patient education, strengthening, stretching, therapeutic activities, therapeutic exercise, home exercise program, gait training and balance  Frequency: 2x week  Duration in visits: 12  Duration in weeks: 6  Plan of Care beginning date: 2023  Plan of Care expiration date: 10/18/2023  Treatment plan discussed with: patient        Subjective Evaluation    History of Present Illness  Mechanism of injury: Patient refers to PT with c/o unsteadiness during ambulation; states gait and balance has decreased in the past three months of insidious onset. Patient states decreased strength in bilateral LE's (right greater than left) since receiving radiation and chemotherapy for CA since 2019. Patient states she has not had any falls in the past year. Patient ambulates indoors and outdoors without an assistive device. Patient denies symptoms of vertigo. 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 Patient states difficulty with stair climbing; states she ascends and descends stairs with a step to gait pattern; states she has a stair glide in her home. Patient Goals  Patient goals for therapy: improved balance and increased strength  Patient's goals regarding treatment: Return to walking for exercise. Pain  Current pain ratin  At best pain ratin  At worst pain ratin          Objective     Strength/Myotome Testing     Left Hip   Planes of Motion   Flexion: 4  Extension: 4  Abduction: 4  Adduction: 4    Right Hip   Planes of Motion   Flexion: 3+  Extension: 4-  Abduction: 3+  Adduction: 4-    Left Knee   Flexion: 4+  Extension: 4+    Right Knee   Flexion: 4+  Extension: 4+    Left Ankle/Foot   Dorsiflexion: 4+  Plantar flexion: 4+    Right Ankle/Foot   Dorsiflexion: 4-  Plantar flexion: 4    Functional Assessment        Comments  TU.2 seconds  Standing feet together, firm surface, eyes closed:  Moderate sway  Tandem stance: Assistance to assume position, loss of balance 3 seconds             Precautions: Hx of endometrial CA, Gastric bypass, depression      Manuals                                                                 Neuro Re-Ed             Tandem stance @ counter/bar HEP            Sidestepping @ bar             FT/EO on foam             Biodex LOS             Biodex maze                                       Ther Ex             Bike             Mini squats HEP SLR flex HEP            SLR abd HEP            Leg press             Leg press HR             Toe raises                          Ther Activity                                       Gait Training                                       Modalities

## 2023-09-11 ENCOUNTER — OFFICE VISIT (OUTPATIENT)
Dept: PHYSICAL THERAPY | Facility: CLINIC | Age: 68
End: 2023-09-11
Payer: COMMERCIAL

## 2023-09-11 ENCOUNTER — HOSPITAL ENCOUNTER (OUTPATIENT)
Dept: INFUSION CENTER | Facility: CLINIC | Age: 68
Discharge: HOME | End: 2023-09-11
Payer: COMMERCIAL

## 2023-09-11 DIAGNOSIS — R26.81 UNSTEADY GAIT: Primary | ICD-10-CM

## 2023-09-11 DIAGNOSIS — Z45.2 ENCOUNTER FOR VENOUS ACCESS DEVICE CARE: Primary | ICD-10-CM

## 2023-09-11 DIAGNOSIS — C54.1 ENDOMETRIAL CANCER (HCC): ICD-10-CM

## 2023-09-11 PROCEDURE — 96523 IRRIG DRUG DELIVERY DEVICE: CPT

## 2023-09-11 PROCEDURE — 97112 NEUROMUSCULAR REEDUCATION: CPT | Performed by: PHYSICAL THERAPIST

## 2023-09-11 PROCEDURE — 97110 THERAPEUTIC EXERCISES: CPT | Performed by: PHYSICAL THERAPIST

## 2023-09-11 NOTE — PROGRESS NOTES
Pt here for central flush,port accessed, brisk blood return noted, port flushed and de accessed, pt will schedule next flush on her way out.  Declined AVS

## 2023-09-11 NOTE — PROGRESS NOTES
Daily Note     Today's date: 2023  Patient name: Tres Menjivar  : 1955  MRN: [id number]  Referring provider: Pancho Bess DO  Dx:   Encounter Diagnosis     ICD-10-CM    1. Unsteady gait  R26.81                      Subjective: Patient without c/o prior to treatment session. Objective: See treatment diary below      Assessment: Patient demonstrates significant difficulty with sitting toe raises on right LE; required verbal cueing to avoid anterior translation of knees during mini squat exercise; moderate challenge with Biodex activities. Plan: Continue per plan of care. Precautions: Hx of endometrial CA, Gastric bypass, depression      Manuals                                                                Neuro Re-Ed             Tandem stance @ counter/bar HEP 5x10" ea. Sidestepping @ bar             FT/EO on foam  1.5'           Biodex LOS  Static 3x           Biodex maze  Easy 2x                                     Ther Ex             Bike             Mini squats HEP 2x10           SLR flex HEP 20x3" ea.            SLR abd HEP 2x10 ea           Leg press  50# 3x10           Leg press HR  30# 3x10           Sitting toe raises  2x10           Bridges  20x3"           Ther Activity                                       Gait Training                                       Modalities

## 2023-09-12 DIAGNOSIS — N18.31 CHRONIC KIDNEY DISEASE, STAGE 3A (HCC): Primary | ICD-10-CM

## 2023-09-12 RX ORDER — SODIUM BICARBONATE 650 MG/1
650 TABLET ORAL 3 TIMES DAILY
Qty: 270 TABLET | Refills: 3 | Status: SHIPPED | OUTPATIENT
Start: 2023-09-12

## 2023-09-14 ENCOUNTER — OFFICE VISIT (OUTPATIENT)
Dept: NEPHROLOGY | Facility: CLINIC | Age: 68
End: 2023-09-14

## 2023-09-14 VITALS
DIASTOLIC BLOOD PRESSURE: 68 MMHG | SYSTOLIC BLOOD PRESSURE: 114 MMHG | HEART RATE: 71 BPM | BODY MASS INDEX: 27.21 KG/M2 | HEIGHT: 59 IN | WEIGHT: 135 LBS

## 2023-09-14 DIAGNOSIS — N18.4 STAGE 4 CHRONIC KIDNEY DISEASE (HCC): Primary | ICD-10-CM

## 2023-09-14 DIAGNOSIS — R60.0 BILATERAL LOWER EXTREMITY EDEMA: ICD-10-CM

## 2023-09-14 DIAGNOSIS — R80.9 NON-NEPHROTIC RANGE PROTEINURIA: ICD-10-CM

## 2023-09-14 DIAGNOSIS — N13.5 URETERAL STRICTURE: ICD-10-CM

## 2023-09-14 DIAGNOSIS — Z85.42 HISTORY OF ENDOMETRIAL CANCER: ICD-10-CM

## 2023-09-14 DIAGNOSIS — R77.8 ABNORMAL SPEP: ICD-10-CM

## 2023-09-14 DIAGNOSIS — N25.81 SECONDARY HYPERPARATHYROIDISM (HCC): ICD-10-CM

## 2023-09-14 DIAGNOSIS — Z86.39 HISTORY OF METABOLIC ACIDOSIS: ICD-10-CM

## 2023-09-14 RX ORDER — FUROSEMIDE 20 MG/1
20 TABLET ORAL AS NEEDED
Qty: 90 TABLET | Refills: 0 | Status: SHIPPED | OUTPATIENT
Start: 2023-09-14

## 2023-09-14 NOTE — PROGRESS NOTES
NEPHROLOGY OFFICE VISIT   Ragini Perez 79 y.o. female MRN: [id number]  9/14/2023    Reason for Visit: Nephrotic range proteinuria    ASSESSMENT and PLAN:  It was a pleasure evaluating your patient in the office today. Thank you for allowing our team to participate in the care of Ms. Ragini Melendez. Please do not hesitate to contact our team if further issues/questions shall arise in the interim.      # Nonnephrotic range proteinuria, previously nephrotic range proteinuria  >>Workup   - Urine albumin to creatinine ratio 150 mg/g in 2017  - Urine protein to creatinine ratio almost 1 g in 12/2020 before initiating Avastin  - Urine protein to creatinine ratio started rising in 06/2022 and subsequently Avastin was held after last dose on 09/09/2022  - Urine protein to creatinine ratio peaked at 17 g in 04/2023  - Urine protein to creatinine ratio currently improved to 2.5 g  - Serum albumin 3.5-3.8  - No clinical evidence of nephrotic syndrome  - HbA1c 5.2, MARY ANNE 2 R-  - CHERI 1:80, dsDNA negative, no hypocomplementemia (no clinical evidence of lupus)  - Hep B/hep C/HIV negative  - Rheumatoid factor negative, cryoglobulin not detected  - Immunofixation showed polyclonal peak in IgG and IgA, serum free light chain ratio 1.8     >>Etiology  - Worsening of proteinuria was related to use of VEGF inhibitor (based on literature review proteinuria can continue for several months even after stopping the medication) but she can have baseline glomerular pathology as proteinuria dates back to 2017  - Differential diagnosis includes proteinuria secondary to VEGF inhibitor versus membranous nephropathy in setting of malignancy (however malignancy is in remission) versus adaptive FSGS versus minimal-change disease versus monoclonal gammopathy of renal significance     >> Plan  - No plans for kidney biopsy at this time as both kidneys are compromised with bilateral nephrostomy tubes in place (especially right kidney is mildly atrophic as compared to left kidney)  - She has not tolerated RAAS inhibition due to low blood pressure   - Avastin has been on hold and there are no plans to restart the drug at this time  - Given abnormal immunofixation and polyclonal peak and IgG and IgA, will discuss with hematology if she needs a bone marrow biopsy    # Recurrent endometrial cancer  - Diagnosis was made in 2017 and she underwent total abdominal hysterectomy and bilateral salpingo oophorectomy   - Status post ENDOBARR (rucaparib, atezolizumab, and bevacizumab) clinical trial started in 12/2020 and finished 12/2022  - Notes reviewed from gynecologic oncology and plans noted for continued surveillance every 6 months or sooner with symptoms     # Chronic Kidney Disease Stage IV  - Etiology/risk factors: Hypertension, obstructive uropathy in setting of bilateral ureteral strictures, underlying glomerular pathology, age-related nephron loss   - Baseline Cr: 0.9-1.1, most recently 1.6 03/2023  - Discussed natural history of progression of chronic kidney disease  - Referral made to CKD modality education  - She will probably be a good candidate for hemodialysis if dialysis is needed as peritoneal dialysis may be difficult due to previous history of radiation therapy  - We will also make a referral to renal transplant once GFR less than 20  - Discussed risk factor reduction for progression of chronic kidney disease  · Intensive blood pressure control as below  · Avoidance of NSAIDs     # Bilateral ureteral strictures  - Notes reviewed from urology and etiology thought to be due to radiation therapy for endometrial cancer  - She currently has bilateral nephrostomy tubes and plans noted for evaluation for ileal conduit formation  - She is currently urinating via nephrostomy tubes (left more than right) and the natural way     # BP/Volume   - Goal BP <120/80 per KDIGO guidelines   - Volume status: Euvolemic  - Status: Blood pressure currently on low side  - Current antihypertensive regimen: She was previously tried on lisinopril to retard progression of proteinuria but this was stopped due to low blood pressure  - Continue to observe off antihypertensive medications     # Anemia  - Target Hb: More than 10 g/dL  - Most recent hemoglobin: 8.7 g/dL  - Status post IV iron supplementation with oncology  - Most recent ferritin 567, iron saturation 14  - No KENNEY given malignancy  - Defer management of anemia to hematology and oncology     # Electrolytes/Acid Base status   >>History of metabolic acidosis  - Goal serum bicarbonate level 24-26  - Most recent serum bicarbonate level 23 09/2023  - Continue sodium bicarbonate 650 mg 3 times daily     # CKD Mineral and Bone Disorder   - Goal Ca 8.5-10 mg/dL, goal Phos 2.7-4.6 mg/dL, goal iPTH 30-70 pg/mL  - Secondary hyperparathyroidism most likely in setting of chronic kidney disease and previous vitamin D insufficiency  - Her vitamin D dose was increased to 4000 units by her PCP would continue this dose for now  - Mildly elevated serum phosphate but at goal for chronic kidney disease    # Other issues   - History of DVT status posttreatment with Lovenox, currently off Lovenox  - History of gastric bypass in 2001    # Follow-up  - Follow-up renal function panel in 1 month  - Follow-up renal function panel and urine studies in 4 months  - Follow-up office visit in 4 months    HPI:  Since last visit:  She had bilateral nephrostomy tubes placed. Bilateral ureteral stents were removed. She was admitted to the hospital with lower extremity edema and DVT was ruled out. She was given intravenous furosemide and her symptoms improved. She has not taken any furosemide since discharge from the hospital.  She denies dyspnea. She denies leg swelling. She denies fatigue. She notices more urine output from her left nephrostomy as compared to right nephrostomy. She also urinates the natural route.     Ragini Melendez is a 79 y.o. female who has Is This A New Presentation, Or A Follow-Up?: Skin Lesions history of endometrial cancer status post total abdominal hysterectomy and bilateral salpingo-oophorectomy, status post chemotherapy with Taxol and carboplatin in 2019, currently onrucaparib, atezolizumab, and bevacizumab. [place] Spurling was held on last 2 cycles due to worsening proteinuria. Sonal Aviles has history of obesity for which he underwent gastric bypass surgery in 2001.  She gained weight and was requiring metformin for prediabetes and quinapril for hypertension before she was diagnosed with endometrial cancer.  Since diagnosis of endometrial cancer she has lost significant amount of weight; anti diabetic and antihypertensive medications were stopped.       >> Major risk factors for CKD  - Diabetes: Previously pre-diabetic on Metformin  - Hypertension: Previous history of hypertension but not on any medications   - Age ? 57 years: Madhu Kim  - Family history of kidney disease: Father had kidney stones, his cousin was on dialysis   - Obesity or metabolic syndrome: Yes      >> Medical history evaluation   - Prior kidney disease or dialysis: No  - Incidental hematuria in the past: Yes with ureteral stent in place   - Urinary symptoms: Urinary incontinence   - History of foamy or frothy urine: No   - History of nephrolithiasis: Yes but never passed a stone   - Diseases that share risk factors with CKD: DM, HTN  - Systemic diseases that might affect kidney: No   - History of use of medications that might affect renal function: No    PATIENT INSTRUCTIONS:    Patient Instructions   Kidney function has overall worsened since start of this year but the amount of protein in the urine has improved. Blood pressure is well controlled. We will refer you for CKD modality education. Currently there are no needs for dialysis but this can be a possibility in future, therefore we would start with education now. Repeat blood work in 1 month and then in 4 months. Repeat urine studies in 4 months.   We will bring you back in nephrology How Severe Is Your Skin Lesion?: moderate office in 4 months. OBJECTIVE:  Current Weight: Weight - Scale: 61.2 kg (135 lb)  Vitals:    09/14/23 0819   BP: 114/68   BP Location: Left arm   Patient Position: Sitting   Cuff Size: Standard   Pulse: 71   Weight: 61.2 kg (135 lb)   Height: 4' 11" (1.499 m)    Body mass index is 27.27 kg/m². REVIEW OF SYSTEMS:    Review of Systems   Constitutional: Negative for chills and fever. HENT: Negative for ear pain and sore throat. Eyes: Negative for pain and visual disturbance. Respiratory: Negative for cough and shortness of breath. Cardiovascular: Negative for chest pain and palpitations. Gastrointestinal: Negative for abdominal pain and vomiting. Genitourinary: Negative for dysuria and hematuria. Musculoskeletal: Negative for arthralgias and back pain. Skin: Negative for color change and rash. Neurological: Negative for seizures and syncope. All other systems reviewed and are negative.       Past Medical History:   Diagnosis Date   • Anemia    • Anxiety    • Cancer ([address])     ENDOMETRIAL   • Chemotherapy induced neutropenia ([address]) 8/30/2019   • CKD (chronic kidney disease) stage 3, GFR 30-59 ml/min ([facility])    • COVID     Fall 2022   • Depression    • DVT (deep venous thrombosis) ([address]) 07/19/2019    RIGHT LEG   • Endometrial cancer ([address]) 12/2017   • GERD (gastroesophageal reflux disease)    • H/O gastric bypass    • Hard to intubate     pt denies AS OF 7/25/23   • History of chemotherapy     started 12/2021endometrial cancer- done 12/23/22   • History of DVT in adulthood     RLE   • History of transfusion 04/13/2023   • Hyperglycemia     vx type 2 dm -- last assessed 4/1/14; resolved 11/7/17   • Hyperlipidemia    • Hypertension     in past- no meds at present   • Iron deficiency anemia     iron infusions weekly   • OAB (overactive bladder)    • Port-A-Cath in place    • Wears glasses        Past Surgical History:   Procedure Laterality Date   • ABDOMINAL SURGERY      GASTRIC BYPASS; 2001   • BREAST BIOPSY Right 06/28/2019    core biopsy; benign   • CHOLECYSTECTOMY      at the time of gastric bypass   • COLONOSCOPY     • CT NEEDLE BIOPSY LYMPH NODE  07/08/2019   • FL GUIDED CENTRAL VENOUS ACCESS DEVICE INSERTION  11/12/2019   • FL RETROGRADE PYELOGRAM  03/30/2023   • FL RETROGRADE PYELOGRAM  05/09/2023   • FL RETROGRADE PYELOGRAM  8/1/2023   • GASTRIC BYPASS     • HYSTERECTOMY Bilateral 2017    total abdominal with salpingo-oophorectomy   • IR NEPHROSTOMY TO NEPHROURETERAL STENT  06/09/2022   • IR NEPHROSTOMY TO NEPHROURETERAL STENT  12/20/2022   • IR NEPHROSTOMY TO NEPHROURETERAL STENT  8/8/2023   • IR NEPHROSTOMY TUBE CHECK/CHANGE/REPOSITION/REINSERTION/UPSIZE  05/27/2022   • IR NEPHROSTOMY TUBE PLACEMENT  07/26/2019   • IR NEPHROSTOMY TUBE PLACEMENT  05/27/2023   • IR NEPHROURETERAL STENT CHECK/CHANGE/REPOSITION  04/06/2021   • IR NEPHROURETERAL STENT CHECK/CHANGE/REPOSITION  06/04/2021   • IR NEPHROURETERAL STENT CHECK/CHANGE/REPOSITION  09/03/2021   • IR NEPHROURETERAL STENT CHECK/CHANGE/REPOSITION  12/07/2021   • IR NEPHROURETERAL STENT CHECK/CHANGE/REPOSITION  03/08/2022   • IR NEPHROURETERAL STENT CHECK/CHANGE/REPOSITION  05/10/2022   • IR NEPHROURETERAL STENT CHECK/CHANGE/REPOSITION  09/19/2022   • IR PICC LINE  09/27/2019   • IR PORT PLACEMENT  07/26/2019   • IR PORT PLACEMENT     • IR PORT REMOVAL  09/20/2019   • OOPHORECTOMY Bilateral 2017   • FL CYSTO BLADDER W/URETERAL CATHETERIZATION Right 03/30/2023    Procedure: CYSTOSCOPY RETROGRADE PYELOGRAM WITH exchange of STENT URETERAL;  Surgeon: Debra Nielsen MD;  [facility];  Service: Urology   • FL CYSTO BLADDER W/URETERAL CATHETERIZATION Bilateral 05/09/2023    Procedure: CYSTOSCOPY, BILATERAL RETROGRADE PYELOGRAM, WITH LEFT INSERTION STENT URETERAL, RIGHT [address];  Surgeon: Caleb Mattson MD;  [facility];  Service: Urology   • FL CYSTO BLADDER W/URETERAL CATHETERIZATION Bilateral 8/1/2023 Has Your Skin Lesion Been Treated?: not been treated Procedure: CYSTOSCOPY BILATERAL RETROGRADE  WITH REMOVAL BILATERAL  STENT URETERAL;  Surgeon: Yenni Toro MD;  [facility];  Service: Urology   • AZ CYSTO W/INSERT URETERAL STENT Right 03/30/2023    Procedure: EXCHANGE STENT URETERAL;  Surgeon: Yenni Toro MD;  [facility];  Service: Urology   • AZ INSJ TUNNELED CTR VAD W/SUBQ PORT AGE 7 YR/> Left 11/12/2019    Procedure: INSERTION VENOUS PORT ( PORT-A-CATH) IR;  Surgeon: Ebony Herrmann DO;  [facility];  Service: Interventional Radiology   • AZ LAPS ABD PRTM&OMENTUM DX W/WO [place] BR/WA [address] [facility] [address] N/A 12/19/2017    Procedure: LAPAROSCOPY DIAGNOSTIC;  Surgeon: Carter Marte MD;  [facility];  Service: Gynecology Oncology   • AZ LAPS Nonaster Blander HYST W/BILAT LMPHADEC RMVL TUBE/OVARY N/A 12/19/2017    Procedure: HYSTERECTOMY LAPAROSCOPIC TOTAL ([address] [facility]) W/ ROBOTICS; BILATERAL SALPINGOOPHERECTOMY; LYMPH NODE DISSECTION; lysis of adhesions;  Surgeon: Carter Marte MD;  [facility];  Service: Gynecology Oncology   • REMOVAL URETERAL STENT Bilateral 8/1/2023    Procedure: REMOVAL STENT URETERAL;  Surgeon: Yenni Toro MD;  [facility];  Service: Urology   • TONSILLECTOMY     • US GUIDED BREAST BIOPSY RIGHT COMPLETE Right 06/28/2019       Family History   Problem Relation Age of Onset   • Hyperlipidemia Mother    • Heart disease Mother    • Ovarian cancer Mother 48   • Colon cancer Mother    • Lymphoma Father    • Breast cancer Sister 61   • No Known Problems Brother    • No Known Problems Brother    • No Known Problems Maternal Grandmother    • Bone cancer Maternal Grandfather    • Uterine cancer Paternal Grandmother    • No Known Problems Paternal Grandfather    • No Known Problems Maternal Aunt    • No Known Problems Maternal Aunt    • No Known Problems Maternal Aunt    • No Known Problems Maternal Aunt    • No Known Problems Paternal Aunt    • No Known Problems Paternal Aunt    • No Known Problems Paternal Aunt    • No Known Problems Paternal Aunt         Social History     Substance and Sexual Activity   Alcohol Use Never     Social History     Substance and Sexual Activity   Drug Use Never     Social History     Tobacco Use   Smoking Status Never   Smokeless Tobacco Never       PHYSICAL EXAM:      Physical Exam  Constitutional:       Appearance: Normal appearance. HENT:      Head: Normocephalic and atraumatic. Mouth/Throat:      Mouth: Mucous membranes are moist.      Pharynx: Oropharynx is clear. Cardiovascular:      Rate and Rhythm: Normal rate and regular rhythm. Pulses: Normal pulses. Heart sounds: Normal heart sounds. Pulmonary:      Effort: Pulmonary effort is normal.      Breath sounds: Normal breath sounds. Abdominal:      General: Bowel sounds are normal.      Palpations: Abdomen is soft. Musculoskeletal:         General: Normal range of motion. Right lower leg: No edema. Left lower leg: No edema. Skin:     General: Skin is warm and dry. Neurological:      General: No focal deficit present. Mental Status: She is alert and oriented to person, place, and time. Mental status is at baseline. Psychiatric:         Mood and Affect: Mood normal.         Behavior: Behavior normal.         Thought Content:  Thought content normal.         Judgment: Judgment normal.         Medications:    Current Outpatient Medications:   •  ARIPiprazole (ABILIFY) 20 MG tablet, Take 20 mg by mouth every morning, Disp: , Rfl:   •  BD PosiFlush 0.9 % SOLN, , Disp: , Rfl:   •  Calcium-Magnesium-Vitamin D (CALCIUM 500 PO), Take 1 capsule by mouth daily at bedtime, Disp: , Rfl:   •  cholecalciferol (VITAMIN D3) 1,000 units tablet, Take 4 tablets (4,000 Units total) by mouth daily (Patient taking differently: Take 4,000 Units by mouth daily at bedtime), Disp: 90 tablet, Rfl: 0  •  Cyanocobalamin (VITAMIN B12 PO), Take 1 capsule by mouth daily after lunch, Disp: , Rfl:   • folic acid (FOLVITE) 1 mg tablet, Take 1 tablet (1 mg total) by mouth daily (Patient taking differently: Take 1 mg by mouth daily at bedtime), Disp: , Rfl: 0  •  Gemtesa 75 MG TABS, TAKE 75 MG BY MOUTH IN THE MORNING (Patient taking differently: Take 1 capsule by mouth every morning), Disp: 90 tablet, Rfl: 2  •  Multiple Vitamin (MULTIVITAMIN) tablet, Take 1 tablet by mouth every morning, Disp: , Rfl:   •  omeprazole (PriLOSEC) 20 mg delayed release capsule, Take 20 mg by mouth every morning, Disp: , Rfl:   •  oxybutynin (DITROPAN XL) 15 MG 24 hr tablet, Take 1 tablet (15 mg total) by mouth daily at bedtime, Disp: 90 tablet, Rfl: 3  •  saccharomyces boulardii (FLORASTOR) 250 mg capsule, Take 1 capsule (250 mg total) by mouth daily after lunch, Disp: , Rfl:   •  senna (SENOKOT) 8.6 MG tablet, Take 1 tablet (8.6 mg total) by mouth 2 (two) times a day as needed for constipation, Disp: 60 tablet, Rfl: 0  •  sodium bicarbonate 650 mg tablet, Take 1 tablet (650 mg total) by mouth 3 (three) times a day, Disp: 270 tablet, Rfl: 3  •  sodium chloride, PF, 0.9 %, 10 mL by Intracatheter route daily Intracatheter flushing daily. May substitute prefilled syringe with normal saline 10 mL vials, 10 mL syringes, and 18 g blunt needles, Disp: 300 mL, Rfl: 2  •  venlafaxine (EFFEXOR-XR) 75 mg 24 hr capsule, TAKE 3 CAPS DAILY, Disp: , Rfl:   •  dronabinol (MARINOL) 2.5 mg capsule, Take 1 capsule (2.5 mg total) by mouth 2 (two) times a day before meals (Patient not taking: Reported on 9/14/2023), Disp: 30 capsule, Rfl: 0  •  furosemide (LASIX) 20 mg tablet, Take 1 tablet (20 mg total) by mouth if needed (for increased leg swelling or weight gain >2 lbs in 1 day) (Patient not taking: Reported on 9/14/2023), Disp: 30 tablet, Rfl: 0  No current facility-administered medications for this visit.     Laboratory Results:        Invalid input(s): "ALBUMIN"    Results for orders placed or performed in visit on 14/66/46   Basic metabolic panel Result Value Ref Range    Sodium 139 135 - 147 mmol/L    Potassium 4.4 3.5 - 5.3 mmol/L    Chloride 108 96 - 108 mmol/L    CO2 23 21 - 32 mmol/L    ANION GAP 8 mmol/L    BUN 53 (H) 5 - 25 mg/dL    Creatinine 2.11 (H) 0.60 - 1.30 mg/dL    Glucose, Fasting 82 65 - 99 mg/dL    Calcium 8.8 8.4 - 10.2 mg/dL    eGFR 23 ml/min/1.73sq m   Protein / creatinine ratio, urine   Result Value Ref Range    Creatinine, Ur 23.8 Reference range not established. mg/dL    Protein Urine Random 61 Reference range not established. mg/dL    Prot/Creat Ratio, Ur 2.56 (H) 0.00 - 0.10   Albumin / creatinine urine ratio   Result Value Ref Range    Creatinine, Ur 23.8 Reference range not established. mg/dL    Albumin,U,Random 126.1 (H) <20.0 mg/L    Albumin Creat Ratio 530 (H) 0 - 30 mg/g creatinine     *Note: Due to a large number of results and/or encounters for the requested time period, some results have not been displayed. A complete set of results can be found in Results Review.

## 2023-09-14 NOTE — PATIENT INSTRUCTIONS
Kidney function has overall worsened since start of this year but the amount of protein in the urine has improved. Blood pressure is well controlled. We will refer you for CKD modality education. Currently there are no needs for dialysis but this can be a possibility in future, therefore we would start with education now. Repeat blood work in 1 month and then in 4 months. Repeat urine studies in 4 months. We will bring you back in nephrology office in 4 months.

## 2023-09-25 ENCOUNTER — APPOINTMENT (OUTPATIENT)
Dept: PHYSICAL THERAPY | Facility: CLINIC | Age: 68
End: 2023-09-25
Payer: COMMERCIAL

## 2023-09-26 ENCOUNTER — OFFICE VISIT (OUTPATIENT)
Dept: FAMILY MEDICINE CLINIC | Facility: CLINIC | Age: 68
End: 2023-09-26
Payer: COMMERCIAL

## 2023-09-26 VITALS
HEART RATE: 74 BPM | BODY MASS INDEX: 27.42 KG/M2 | SYSTOLIC BLOOD PRESSURE: 122 MMHG | RESPIRATION RATE: 16 BRPM | HEIGHT: 59 IN | OXYGEN SATURATION: 98 % | DIASTOLIC BLOOD PRESSURE: 78 MMHG | WEIGHT: 136 LBS

## 2023-09-26 DIAGNOSIS — Z23 NEED FOR INFLUENZA VACCINATION: ICD-10-CM

## 2023-09-26 DIAGNOSIS — Z00.00 ENCOUNTER FOR SUBSEQUENT ANNUAL WELLNESS VISIT (AWV) IN MEDICARE PATIENT: Primary | ICD-10-CM

## 2023-09-26 DIAGNOSIS — Z12.11 COLON CANCER SCREENING: ICD-10-CM

## 2023-09-26 DIAGNOSIS — M80.00XS OSTEOPOROSIS WITH CURRENT PATHOLOGICAL FRACTURE, UNSPECIFIED OSTEOPOROSIS TYPE, SEQUELA: ICD-10-CM

## 2023-09-26 PROCEDURE — 90662 IIV NO PRSV INCREASED AG IM: CPT | Performed by: FAMILY MEDICINE

## 2023-09-26 PROCEDURE — G0439 PPPS, SUBSEQ VISIT: HCPCS | Performed by: FAMILY MEDICINE

## 2023-09-26 PROCEDURE — G0008 ADMIN INFLUENZA VIRUS VAC: HCPCS | Performed by: FAMILY MEDICINE

## 2023-09-26 NOTE — PATIENT INSTRUCTIONS
Medicare Preventive Visit Patient Instructions  Thank you for completing your Welcome to Medicare Visit or Medicare Annual Wellness Visit today. Your next wellness visit will be due in one year (9/26/2024). The screening/preventive services that you may require over the next 5-10 years are detailed below. Some tests may not apply to you based off risk factors and/or age. Screening tests ordered at today's visit but not completed yet may show as past due. Also, please note that scanned in results may not display below. Preventive Screenings:  Service Recommendations Previous Testing/Comments   Colorectal Cancer Screening  * Colonoscopy    * Fecal Occult Blood Test (FOBT)/Fecal Immunochemical Test (FIT)  * Fecal DNA/Cologuard Test  * Flexible Sigmoidoscopy Age: 43-73 years old   Colonoscopy: every 10 years (may be performed more frequently if at higher risk)  OR  FOBT/FIT: every 1 year  OR  Cologuard: every 3 years  OR  Sigmoidoscopy: every 5 years  Screening may be recommended earlier than age 39 if at higher risk for colorectal cancer. Also, an individualized decision between you and your healthcare provider will decide whether screening between the ages of 77-80 would be appropriate. Colonoscopy: Not on file  FOBT/FIT: Not on file  Cologuard: Not on file  Sigmoidoscopy: Not on file          Breast Cancer Screening Age: 36 years old  Frequency: every 1-2 years  Not required if history of left and right mastectomy Mammogram: 07/28/2023    Screening Current   Cervical Cancer Screening Between the ages of 21-29, pap smear recommended once every 3 years. Between the ages of 32-69, can perform pap smear with HPV co-testing every 5 years.    Recommendations may differ for women with a history of total hysterectomy, cervical cancer, or abnormal pap smears in past. Pap Smear: Not on file    Screening Not Indicated   Hepatitis C Screening Once for adults born between 1945 and 1965  More frequently in patients at high risk for Hepatitis C Hep C Antibody: Not on file    Screening Current   Diabetes Screening 1-2 times per year if you're at risk for diabetes or have pre-diabetes Fasting glucose: 82 mg/dL (9/6/2023)  A1C: 5.2 % (10/31/2022)  Screening Current   Cholesterol Screening Once every 5 years if you don't have a lipid disorder. May order more often based on risk factors. Lipid panel: 01/28/2023    Screening Current     Other Preventive Screenings Covered by Medicare:  1. Abdominal Aortic Aneurysm (AAA) Screening: covered once if your at risk. You're considered to be at risk if you have a family history of AAA. 2. Lung Cancer Screening: covers low dose CT scan once per year if you meet all of the following conditions: (1) Age 48-67; (2) No signs or symptoms of lung cancer; (3) Current smoker or have quit smoking within the last 15 years; (4) You have a tobacco smoking history of at least 20 pack years (packs per day multiplied by number of years you smoked); (5) You get a written order from a healthcare provider. 3. Glaucoma Screening: covered annually if you're considered high risk: (1) You have diabetes OR (2) Family history of glaucoma OR (3)  aged 48 and older OR (3)  American aged 72 and older  3. Osteoporosis Screening: covered every 2 years if you meet one of the following conditions: (1) You're estrogen deficient and at risk for osteoporosis based off medical history and other findings; (2) Have a vertebral abnormality; (3) On glucocorticoid therapy for more than 3 months; (4) Have primary hyperparathyroidism; (5) On osteoporosis medications and need to assess response to drug therapy. · Last bone density test (DXA Scan): Not on file. 5. HIV Screening: covered annually if you're between the age of 14-79. Also covered annually if you are younger than 13 and older than 72 with risk factors for HIV infection.  For pregnant patients, it is covered up to 3 times per pregnancy. Immunizations:  Immunization Recommendations   Influenza Vaccine Annual influenza vaccination during flu season is recommended for all persons aged >= 6 months who do not have contraindications   Pneumococcal Vaccine   * Pneumococcal conjugate vaccine = PCV13 (Prevnar 13), PCV15 (Vaxneuvance), PCV20 (Prevnar 20)  * Pneumococcal polysaccharide vaccine = PPSV23 (Pneumovax) Adults 20-63 years old: 1-3 doses may be recommended based on certain risk factors  Adults 72 years old: 1-2 doses may be recommended based off what pneumonia vaccine you previously received   Hepatitis B Vaccine 3 dose series if at intermediate or high risk (ex: diabetes, end stage renal disease, liver disease)   Tetanus (Td) Vaccine - COST NOT COVERED BY MEDICARE PART B Following completion of primary series, a booster dose should be given every 10 years to maintain immunity against tetanus. Td may also be given as tetanus wound prophylaxis. Tdap Vaccine - COST NOT COVERED BY MEDICARE PART B Recommended at least once for all adults. For pregnant patients, recommended with each pregnancy. Shingles Vaccine (Shingrix) - COST NOT COVERED BY MEDICARE PART B  2 shot series recommended in those aged 48 and above     Health Maintenance Due:      Topic Date Due   • DXA SCAN  Never done   • Colorectal Cancer Screening  10/05/2020   • Breast Cancer Screening: Mammogram  07/28/2024   • Hepatitis C Screening  Completed     Immunizations Due:      Topic Date Due   • COVID-19 Vaccine (4 - Booster for Pfizer series) 11/13/2021   • Influenza Vaccine (1) 09/01/2023     Advance Directives   What are advance directives? Advance directives are legal documents that state your wishes and plans for medical care. These plans are made ahead of time in case you lose your ability to make decisions for yourself. Advance directives can apply to any medical decision, such as the treatments you want, and if you want to donate organs.    What are the types of advance directives? There are many types of advance directives, and each state has rules about how to use them. You may choose a combination of any of the following:  · Living will: This is a written record of the treatment you want. You can also choose which treatments you do not want, which to limit, and which to stop at a certain time. This includes surgery, medicine, IV fluid, and tube feedings. · Durable power of  for [facility]): This is a written record that states who you want to make healthcare choices for you when you are unable to make them for yourself. This person, called a proxy, is usually a family member or a friend. You may choose more than 1 proxy. · Do not resuscitate (DNR) order:  A DNR order is used in case your heart stops beating or you stop breathing. It is a request not to have certain forms of treatment, such as CPR. A DNR order may be included in other types of advance directives. · Medical directive: This covers the care that you want if you are in a coma, near death, or unable to make decisions for yourself. You can list the treatments you want for each condition. Treatment may include pain medicine, surgery, blood transfusions, dialysis, IV or tube feedings, and a ventilator (breathing machine). · Values history: This document has questions about your views, beliefs, and how you feel and think about life. This information can help others choose the care that you would choose. Why are advance directives important? An advance directive helps you control your care. Although spoken wishes may be used, it is better to have your wishes written down. Spoken wishes can be misunderstood, or not followed. Treatments may be given even if you do not want them. An advance directive may make it easier for your family to make difficult choices about your care. Urinary Incontinence   Urinary incontinence (UI)  is when you lose control of your bladder.  UI develops because your bladder cannot store or empty urine properly. The 3 most common types of UI are stress incontinence, urge incontinence, or both. Medicines:   · May be given to help strengthen your bladder control. Report any side effects of medication to your healthcare provider. Do pelvic muscle exercises often:  Your pelvic muscles help you stop urinating. Squeeze these muscles tight for 5 seconds, then relax for 5 seconds. Gradually work up to squeezing for 10 seconds. Do 3 sets of 15 repetitions a day, or as directed. This will help strengthen your pelvic muscles and improve bladder control. Train your bladder:  Go to the bathroom at set times, such as every 2 hours, even if you do not feel the urge to go. You can also try to hold your urine when you feel the urge to go. For example, hold your urine for 5 minutes when you feel the urge to go. As that becomes easier, hold your urine for 10 minutes. Self-care:   · Keep a UI record. Write down how often you leak urine and how much you leak. Make a note of what you were doing when you leaked urine. · Drink liquids as directed. You may need to limit the amount of liquid you drink to help control your urine leakage. Do not drink any liquid right before you go to bed. Limit or do not have drinks that contain caffeine or alcohol. · Prevent constipation. Eat a variety of high-fiber foods. Good examples are high-fiber cereals, beans, vegetables, and whole-grain breads. Walking is the best way to trigger your intestines to have a bowel movement. · Exercise regularly and maintain a healthy weight. Weight loss and exercise will decrease pressure on your bladder and help you control your leakage. · Use a catheter as directed  to help empty your bladder. A catheter is a tiny, plastic tube that is put into your bladder to drain your urine. · Go to behavior therapy as directed. Behavior therapy may be used to help you learn to control your urge to urinate.     Weight Management   Why it is important to manage your weight:  Being overweight increases your risk of health conditions such as heart disease, high blood pressure, type 2 diabetes, and certain types of cancer. It can also increase your risk for osteoarthritis, sleep apnea, and other respiratory problems. Aim for a slow, steady weight loss. Even a small amount of weight loss can lower your risk of health problems. How to lose weight safely:  A safe and healthy way to lose weight is to eat fewer calories and get regular exercise. You can lose up about 1 pound a week by decreasing the number of calories you eat by 500 calories each day. Healthy meal plan for weight management:  A healthy meal plan includes a variety of foods, contains fewer calories, and helps you stay healthy. A healthy meal plan includes the following:  · Eat whole-grain foods more often. A healthy meal plan should contain fiber. Fiber is the part of grains, fruits, and vegetables that is not broken down by your body. Whole-grain foods are healthy and provide extra fiber in your diet. Some examples of whole-grain foods are whole-wheat breads and pastas, oatmeal, brown rice, and bulgur. · Eat a variety of vegetables every day. Include dark, leafy greens such as spinach, kale, chalino greens, and mustard greens. Eat yellow and orange vegetables such as carrots, sweet potatoes, and winter squash. · Eat a variety of fruits every day. Choose fresh or canned fruit (canned in its own juice or light syrup) instead of juice. Fruit juice has very little or no fiber. · Eat low-fat dairy foods. Drink fat-free (skim) milk or 1% milk. Eat fat-free yogurt and low-fat cottage cheese. Try low-fat cheeses such as mozzarella and other reduced-fat cheeses. · Choose meat and other protein foods that are low in fat. Choose beans or other legumes such as split peas or lentils.  Choose fish, skinless poultry (chicken or turkey), or lean cuts of red meat (beef or pork). Before you cook meat or poultry, cut off any visible fat. · Use less fat and oil. Try baking foods instead of frying them. Add less fat, such as margarine, sour cream, regular salad dressing and mayonnaise to foods. Eat fewer high-fat foods. Some examples of high-fat foods include french fries, doughnuts, ice cream, and cakes. · Eat fewer sweets. Limit foods and drinks that are high in sugar. This includes candy, cookies, regular soda, and sweetened drinks. Exercise:  Exercise at least 30 minutes per day on most days of the week. Some examples of exercise include walking, biking, dancing, and swimming. You can also fit in more physical activity by taking the stairs instead of the elevator or parking farther away from stores. Ask your healthcare provider about the best exercise plan for you. © Copyright [employer] 2018 Information is for End User's use only and may not be sold, redistributed or otherwise used for commercial purposes.  All illustrations and images included in CareNotes® are the copyrighted property of [employer] or [address] [place] [address]

## 2023-09-26 NOTE — PROGRESS NOTES
Assessment and Plan:     Problem List Items Addressed This Visit        Musculoskeletal and Integument    Osteoporosis    Relevant Orders    DXA bone density spine hip and pelvis   Other Visit Diagnoses     Encounter for subsequent annual wellness visit (AWV) in Medicare patient   - UTD with PCV20   - received Flu vaccine in the office today   - advised to get new COVID vaccine - pt aware and agreeable   - UTD with Breast Ca screening   - overdue for Colon Ca screening - interested in screening Cscope - referred to GI   - due for Osteoporosis screening - script given for DXA  - reviewed recent labs  - RTO in 1yr for AWV or as needed - pt aware and agreeable       Need for influenza vaccination        Relevant Orders    influenza vaccine, high-dose, PF 0.7 mL (FLUZONE HIGH-DOSE) (Completed)    Colon cancer screening        Relevant Orders    Ambulatory Referral to Gastroenterology           Preventive health issues were discussed with patient, and age appropriate screening tests were ordered as noted in patient's After Visit Summary. Personalized health advice and appropriate referrals for health education or preventive services given if needed, as noted in patient's After Visit Summary.      History of Present Illness:     Patient presents for a Medicare Wellness Visit    HPI see below       Patient Care Team:  Kehinde Harding DO as PCP - General ([address] [place] [address])  BRITANY Weinstein MD (Obstetrics and Gynecology)  Nancy Mojica MD (Radiation Oncology)  Cash Peters RD (Nutrition)  Generic Provider MD Latonia (Inactive) as Referring Physician ([address] [place] [address],[address])  Margy Nuñez MD (Gynecologic Oncology)  Jean-Claude Mckeon (Nutrition)  Randy Trevizo MD (Nephrology)     Review of Systems:     Review of Systems as per HPI      Problem List:     Patient Active Problem List   Diagnosis   • Benign essential hypertension   • Major depression, chronic   • Dyslipidemia   • Endometrial cancer ([address])   • Encounter for follow-up surveillance of endometrial cancer   • Acute deep vein thrombosis (DVT) of proximal vein of lower extremity ([address])   • Breast cancer screening   • Encounter for central line care   • Chemotherapy induced neutropenia ([address])   • Anemia   • Acute on chronic kidney disease   • Bilateral lower extremity edema   • Severe protein-calorie malnutrition (HCC)   • Hypomagnesemia   • Obstruction of right ureter   • Overactive bladder   • Acute left-sided low back pain without sciatica   • Nephrostomy status (HCC)   • Bilateral piriformis syndrome   • Chronic pain syndrome   • Myofascial pain syndrome   • Pathological fracture due to osteoporosis   • Osteoporosis   • Dental disorder   • Need for follow-up by    • Closed nondisplaced fracture of right pubis with delayed healing   • Secondary malignant neoplasm of other specified sites [facility])   • Chronic UTI   • Depression, recurrent ([address])   • Vaginal atrophy   • Nephrotoxicity   • Dry mouth   • Abnormal iron saturation   • Iron deficiency anemia secondary to inadequate dietary iron intake   • Hemorrhagic cystitis w/ pyelonephritis   • History of DVT (deep vein thrombosis)   • Other proteinuria   • Stage 3 chronic kidney disease ([address])   • Encounter for venous access device care   • MGUS (monoclonal gammopathy of unknown significance)   • Left-sided chest wall pain   • Ureteral stricture      Past Medical and Surgical History:     Past Medical History:   Diagnosis Date   • Anemia    • Anxiety    • Cancer ([address])     ENDOMETRIAL   • Chemotherapy induced neutropenia ([address]) 8/30/2019   • CKD (chronic kidney disease) stage 3, GFR 30-59 ml/min ([address])    • COVID     Fall 2022   • Depression    • DVT (deep venous thrombosis) ([address]) 07/19/2019    RIGHT LEG   • Endometrial cancer ([address]) 12/2017   • GERD (gastroesophageal reflux disease)    • H/O gastric bypass    • Hard to intubate     pt denies AS OF 7/25/23   • History of chemotherapy     started 12/2021endometrial cancer- done 12/23/22   • History of DVT in adulthood     RLE   • History of transfusion 04/13/2023   • Hyperglycemia     vx type 2 dm -- last assessed 4/1/14; resolved 11/7/17   • Hyperlipidemia    • Hypertension     in past- no meds at present   • Iron deficiency anemia     iron infusions weekly   • OAB (overactive bladder)    • Port-A-Cath in place    • Wears glasses      Past Surgical History:   Procedure Laterality Date   • ABDOMINAL SURGERY      GASTRIC BYPASS; 2001   • BREAST BIOPSY Right 06/28/2019    core biopsy; benign   • CHOLECYSTECTOMY      at the time of gastric bypass   • COLONOSCOPY     • CT NEEDLE BIOPSY LYMPH NODE  07/08/2019   • FL GUIDED CENTRAL VENOUS ACCESS DEVICE INSERTION  11/12/2019   • FL RETROGRADE PYELOGRAM  03/30/2023   • FL RETROGRADE PYELOGRAM  05/09/2023   • FL RETROGRADE PYELOGRAM  8/1/2023   • GASTRIC BYPASS     • HYSTERECTOMY Bilateral 2017    total abdominal with salpingo-oophorectomy   • IR NEPHROSTOMY TO NEPHROURETERAL STENT  06/09/2022   • IR NEPHROSTOMY TO NEPHROURETERAL STENT  12/20/2022   • IR NEPHROSTOMY TO NEPHROURETERAL STENT  8/8/2023   • IR NEPHROSTOMY TUBE CHECK/CHANGE/REPOSITION/REINSERTION/UPSIZE  05/27/2022   • IR NEPHROSTOMY TUBE PLACEMENT  07/26/2019   • IR NEPHROSTOMY TUBE PLACEMENT  05/27/2023   • IR NEPHROURETERAL STENT CHECK/CHANGE/REPOSITION  04/06/2021   • IR NEPHROURETERAL STENT CHECK/CHANGE/REPOSITION  06/04/2021   • IR NEPHROURETERAL STENT CHECK/CHANGE/REPOSITION  09/03/2021   • IR NEPHROURETERAL STENT CHECK/CHANGE/REPOSITION  12/07/2021   • IR NEPHROURETERAL STENT CHECK/CHANGE/REPOSITION  03/08/2022   • IR NEPHROURETERAL STENT CHECK/CHANGE/REPOSITION  05/10/2022   • IR NEPHROURETERAL STENT CHECK/CHANGE/REPOSITION  09/19/2022   • IR PICC LINE  09/27/2019   • IR PORT PLACEMENT  07/26/2019   • IR PORT PLACEMENT     • IR PORT REMOVAL  09/20/2019   • OOPHORECTOMY Bilateral 2017   • AZ CYSTO BLADDER W/URETERAL CATHETERIZATION Right 03/30/2023    Procedure: CYSTOSCOPY RETROGRADE PYELOGRAM WITH exchange of STENT URETERAL;  Surgeon: Laura Burton MD;  [facility];  Service: Urology   • IA CYSTO BLADDER W/URETERAL CATHETERIZATION Bilateral 05/09/2023    Procedure: CYSTOSCOPY, BILATERAL RETROGRADE PYELOGRAM, WITH LEFT INSERTION STENT URETERAL, RIGHT URTERAL STENT EXCHANGE;  Surgeon: Nadeem Bob MD;  [facility];  Service: Urology   • IA CYSTO BLADDER W/URETERAL CATHETERIZATION Bilateral 8/1/2023    Procedure: CYSTOSCOPY BILATERAL RETROGRADE  WITH REMOVAL BILATERAL  STENT URETERAL;  Surgeon: Laura Burton MD;  [facility];  Service: Urology   • IA CYSTO W/INSERT URETERAL STENT Right 03/30/2023    Procedure: EXCHANGE STENT URETERAL;  Surgeon: Laura Burton MD;  [facility];  Service: Urology   • IA INSJ TUNNELED CTR VAD W/SUBQ PORT AGE 5 YR/> Left 11/12/2019    Procedure: INSERTION VENOUS PORT ( PORT-A-CATH) IR;  Surgeon: Pedro Mace DO;  [facility];  Service: Interventional Radiology   • IA LAPS ABD PRTM&OMENTUM DX W/WO SPEC BR/WA SPX N/A 12/19/2017    Procedure: LAPAROSCOPY DIAGNOSTIC;  Surgeon: Linnea Muniz MD;  [facility];  Service: Gynecology Oncology   • IA LAPS W/RAD HYST W/BILAT LMPHADEC RMVL TUBE/OVARY N/A 12/19/2017    Procedure: HYSTERECTOMY LAPAROSCOPIC TOTAL ([facility]) W/ ROBOTICS; BILATERAL SALPINGOOPHERECTOMY; LYMPH NODE DISSECTION; lysis of adhesions;  Surgeon: Linnea Muniz MD;  [facility];  Service: Gynecology Oncology   • REMOVAL URETERAL STENT Bilateral 8/1/2023    Procedure: REMOVAL STENT URETERAL;  Surgeon: Laura Burton MD;  [facility];  Service: Urology   • TONSILLECTOMY     • US GUIDED BREAST BIOPSY RIGHT COMPLETE Right 06/28/2019      Family History:     Family History   Problem Relation Age of Onset   • Hyperlipidemia Mother    • Heart disease Mother    • Ovarian cancer Mother 48   • Colon cancer Mother    • Lymphoma Father    • Breast cancer Sister 61   • No Known Problems Brother    • No Known Problems Brother    • No Known Problems Maternal Grandmother    • Bone cancer Maternal Grandfather    • Uterine cancer Paternal Grandmother    • No Known Problems Paternal Grandfather    • No Known Problems Maternal Aunt    • No Known Problems Maternal Aunt    • No Known Problems Maternal Aunt    • No Known Problems Maternal Aunt    • No Known Problems Paternal Aunt    • No Known Problems Paternal Aunt    • No Known Problems Paternal Aunt    • No Known Problems Paternal Aunt       Social History:     Social History     Socioeconomic History   • Marital status: Single     Spouse name: None   • Number of children: None   • Years of education: None   • Highest education level: None   Occupational History   • None   Tobacco Use   • Smoking status: Never   • Smokeless tobacco: Never   Vaping Use   • Vaping Use: Never used   Substance and Sexual Activity   • Alcohol use: Never   • Drug use: Never   • Sexual activity: Not Currently   Other Topics Concern   • None   Social History Narrative   • None     Social Determinants of Health     Financial Resource Strain: Low Risk  (9/26/2023)    Overall Financial Resource Strain (CARDIA)    • Difficulty of Paying Living Expenses: Not very hard   Food Insecurity: No Food Insecurity (5/30/2023)    Hunger Vital Sign    • Worried About Running Out of Food in the Last Year: Never true    • Ran Out of Food in the Last Year: Never true   Transportation Needs: No Transportation Needs (9/26/2023)    PRAPARE - Transportation    • Lack of Transportation (Medical): No    • Lack of Transportation (Non-Medical):  No   Physical Activity: Not on file   Stress: Not on file   Social Connections: Not on file   Intimate Partner Violence: Not on file   Housing Stability: Low Risk  (5/30/2023)    Housing Stability Vital Sign    • Unable to Pay for Housing in the Last Year: No • Number of Places Lived in the Last Year: 1    • Unstable Housing in the Last Year: No      Medications and Allergies:     Current Outpatient Medications   Medication Sig Dispense Refill   • ARIPiprazole (ABILIFY) 20 MG tablet Take 20 mg by mouth every morning     • BD PosiFlush 0.9 % SOLN      • Calcium-Magnesium-Vitamin D (CALCIUM 500 PO) Take 1 capsule by mouth daily at bedtime     • cholecalciferol (VITAMIN D3) 1,000 units tablet Take 4 tablets (4,000 Units total) by mouth daily (Patient taking differently: Take 4,000 Units by mouth daily at bedtime) 90 tablet 0   • Cyanocobalamin (VITAMIN B12 PO) Take 1 capsule by mouth daily after lunch     • dronabinol (MARINOL) 2.5 mg capsule Take 1 capsule (2.5 mg total) by mouth 2 (two) times a day before meals 30 capsule 0   • folic acid (FOLVITE) 1 mg tablet Take 1 tablet (1 mg total) by mouth daily (Patient taking differently: Take 1 mg by mouth daily at bedtime)  0   • furosemide (LASIX) 20 mg tablet TAKE 1 TABLET (20 MG TOTAL) BY MOUTH IF NEEDED (FOR INCREASED LEG SWELLING OR WEIGHT GAIN >2 LBS IN 1 DAY) 90 tablet 0   • Gemtesa 75 MG TABS TAKE 75 MG BY MOUTH IN THE MORNING (Patient taking differently: Take 1 capsule by mouth every morning) 90 tablet 2   • Multiple Vitamin (MULTIVITAMIN) tablet Take 1 tablet by mouth every morning     • omeprazole (PriLOSEC) 20 mg delayed release capsule Take 20 mg by mouth every morning     • oxybutynin (DITROPAN XL) 15 MG 24 hr tablet Take 1 tablet (15 mg total) by mouth daily at bedtime 90 tablet 3   • saccharomyces boulardii (FLORASTOR) 250 mg capsule Take 1 capsule (250 mg total) by mouth daily after lunch     • senna (SENOKOT) 8.6 MG tablet Take 1 tablet (8.6 mg total) by mouth 2 (two) times a day as needed for constipation 60 tablet 0   • sodium bicarbonate 650 mg tablet Take 1 tablet (650 mg total) by mouth 3 (three) times a day 270 tablet 3   • sodium chloride, PF, 0.9 % 10 mL by Intracatheter route daily Intracatheter flushing daily. May substitute prefilled syringe with normal saline 10 mL vials, 10 mL syringes, and 18 g blunt needles 300 mL 2   • venlafaxine (EFFEXOR-XR) 75 mg 24 hr capsule TAKE 3 CAPS DAILY       No current facility-administered medications for this visit. Allergies   Allergen Reactions   • Cephalosporins Rash     Which Cephalosporin reaction was to not specified; however, has tolerated Amoxicillin, Cefazolin, and Cefepime      Immunizations:     Immunization History   Administered Date(s) Administered   • COVID-19 PFIZER VACCINE 0.3 ML IM 03/10/2021, 04/07/2021, 09/18/2021   • INFLUENZA 01/20/2023   • Influenza, high dose seasonal 0.7 mL 01/20/2023, 09/26/2023   • Influenza, injectable, quadrivalent, preservative free 0.5 mL 11/08/2019   • Influenza, recombinant, quadrivalent,injectable, preservative free 10/14/2020, 12/07/2021   • Pneumococcal Conjugate Vaccine 20-valent (Pcv20), Polysace 01/20/2023   • Tuberculin Skin Test-PPD Intradermal 09/29/2015      Health Maintenance:         Topic Date Due   • DXA SCAN  Never done   • Colorectal Cancer Screening  10/05/2020   • Breast Cancer Screening: Mammogram  07/28/2024   • Hepatitis C Screening  Completed         Topic Date Due   • COVID-19 Vaccine (4 - Booster for Pfizer series) 11/13/2021      Medicare Screening Tests and Risk Assessments:     Ragini is here for her Initial Wellness visit. Last Medicare Wellness visit information reviewed, patient interviewed and updates made to the record today. Health Risk Assessment:   Patient rates overall health as fair. Patient feels that their physical health rating is slightly worse. Patient is satisfied with their life. Eyesight was rated as same. Hearing was rated as same. Patient feels that their emotional and mental health rating is slightly worse. Patients states they are sometimes angry. Patient states they are always unusually tired/fatigued. Pain experienced in the last 7 days has been none.  Patient states that she has experienced weight loss or gain in last 6 months. Depression Screening:   PHQ-9 Score: 0      Fall Risk Screening: In the past year, patient has experienced: no history of falling in past year      Urinary Incontinence Screening:   Patient has leaked urine accidently in the last six months. Home Safety:  Patient has trouble with stairs inside or outside of their home. Patient has working smoke alarms and has no working carbon monoxide detector. Nutrition:   Current diet is Regular. Medications:   Patient is currently taking over-the-counter supplements. OTC medications include: see medication list. Patient is able to manage medications. Activities of Daily Living (ADLs)/Instrumental Activities of Daily Living (IADLs):   Walk and transfer into and out of bed and chair?: Yes  Dress and groom yourself?: Yes    Bathe or shower yourself?: Yes    Feed yourself? Yes  Do your laundry/housekeeping?: Yes  Manage your money, pay your bills and track your expenses?: Yes  Make your own meals?: Yes    Do your own shopping?: No    Previous Hospitalizations:   Any hospitalizations or ED visits within the last 12 months?: Yes    How many hospitalizations have you had in the last year?: more than 4    Advance Care Planning:   Living will: No    Durable POA for healthcare:  Yes    Advanced directive: No      Comments: Sister magdy is durable poa/ and brothivon Abreu is durable poa    Cognitive Screening:   Provider or family/friend/caregiver concerned regarding cognition?: No    PREVENTIVE SCREENINGS      Cardiovascular Screening:    General: Screening Current and Risks and Benefits Discussed      Diabetes Screening:     General: Screening Current and Risks and Benefits Discussed      Colorectal Cancer Screening:       Due for: Colonoscopy - High Risk      Breast Cancer Screening:     General: Screening Current      Cervical Cancer Screening:    General: Screening Not Indicated      Osteoporosis Screening:    General: Screening Not Indicated and History Osteoporosis      Abdominal Aortic Aneurysm (AAA) Screening:        General: Screening Not Indicated      Lung Cancer Screening:     General: Screening Not Indicated      Hepatitis C Screening:    General: Screening Current      Preventive Screening Comments: 75yo F presents to the office for sub AWV   - UTD with PCV20   - received Flu vaccine in the office today   - UTD with Breast Ca screening   - overdue for Colon Ca screening - interested in screening Cscope  - reviewed recent labs  - denies F/C/N/V/CP/palpitations/SOB/wheezing/abd pain/LE edema     Screening, Brief Intervention, and Referral to Treatment (SBIRT)    Screening  Typical number of drinks in a day: 0  Typical number of drinks in a week: 0  Interpretation: Low risk drinking behavior. Single Item Drug Screening:  How often have you used an illegal drug (including marijuana) or a prescription medication for non-medical reasons in the past year? never    Single Item Drug Screen Score: 0  Interpretation: Negative screen for possible drug use disorder    No results found. Physical Exam:     /78   Pulse 74   Resp 16   Ht 4' 11" (1.499 m)   Wt 61.7 kg (136 lb)   LMP  (LMP Unknown)   SpO2 98%   BMI 27.47 kg/m²     Physical Exam  Vitals reviewed. Constitutional:       General: She is not in acute distress. Appearance: Normal appearance. She is not ill-appearing, toxic-appearing or diaphoretic. HENT:      Head: Normocephalic and atraumatic. Right Ear: External ear normal.      Left Ear: External ear normal.      Nose: Nose normal.   Eyes:      General: No scleral icterus. Right eye: No discharge. Left eye: No discharge. Extraocular Movements: Extraocular movements intact. Conjunctiva/sclera: Conjunctivae normal.   Cardiovascular:      Rate and Rhythm: Normal rate and regular rhythm. Heart sounds: Normal heart sounds.    Pulmonary: Effort: Pulmonary effort is normal. No respiratory distress. Breath sounds: Normal breath sounds. Abdominal:      Palpations: Abdomen is soft. Musculoskeletal:         General: Normal range of motion. Cervical back: Normal range of motion. Right lower leg: No edema. Left lower leg: No edema. Skin:     General: Skin is warm. Neurological:      General: No focal deficit present. Mental Status: She is alert and oriented to person, place, and time.    Psychiatric:         Mood and Affect: Mood normal.         Behavior: Behavior normal.          Julia Saba DO

## 2023-09-27 ENCOUNTER — APPOINTMENT (OUTPATIENT)
Dept: PHYSICAL THERAPY | Facility: CLINIC | Age: 68
End: 2023-09-27
Payer: COMMERCIAL

## 2023-10-02 ENCOUNTER — OFFICE VISIT (OUTPATIENT)
Dept: PHYSICAL THERAPY | Facility: CLINIC | Age: 68
End: 2023-10-02
Payer: COMMERCIAL

## 2023-10-02 DIAGNOSIS — R26.81 UNSTEADY GAIT: Primary | ICD-10-CM

## 2023-10-02 PROCEDURE — 97112 NEUROMUSCULAR REEDUCATION: CPT | Performed by: PHYSICAL THERAPIST

## 2023-10-02 PROCEDURE — 97110 THERAPEUTIC EXERCISES: CPT | Performed by: PHYSICAL THERAPIST

## 2023-10-02 NOTE — PROGRESS NOTES
Daily Note     Today's date: 10/2/2023  Patient name: Angel Koehler  : 1955  MRN: [id number]  Referring provider: Zev Moncada DO  Dx:   Encounter Diagnosis     ICD-10-CM    1. Unsteady gait  R26.81                      Subjective: Patient states no significant change at this time since last treatment session; states she had not been able to attend PT secondary to not having any transportation. Objective: See treatment diary below      Assessment: Patient demonstrated significant challenge with sidestepping on foam beam; moderate challenge with SLR flexion and abduction; stated mild fatigue at completion of treatment session. Plan: Continue per plan of care. Precautions: Hx of endometrial CA, Gastric bypass, depression      Manuals 9/6 9/11 10/2                                                              Neuro Re-Ed             Tandem stance @ counter/bar HEP 5x10" ea. 5x10" ea. Sidestepping @ bar   Foam 1 lap          FT/EO on foam  1.5' 1.5'          Biodex LOS  Static 3x Static 3x          Biodex maze  Easy 2x Easy 2x                                    Ther Ex             Bike             Mini squats HEP 2x10 2x10          SLR flex HEP 20x3" ea. 20x3" ea.           SLR abd HEP 2x10 ea 2x10 ea          Leg press  50# 3x10 50# 3x10          Leg press HR  30# 3x10 30# 3x10          Sitting toe raises  2x10 2x10          Bridges  20x3" 20x3"          Ther Activity                                       Gait Training                                       Modalities

## 2023-10-06 ENCOUNTER — OFFICE VISIT (OUTPATIENT)
Dept: PHYSICAL THERAPY | Facility: CLINIC | Age: 68
End: 2023-10-06
Payer: COMMERCIAL

## 2023-10-06 DIAGNOSIS — R26.81 UNSTEADY GAIT: Primary | ICD-10-CM

## 2023-10-06 PROCEDURE — 97112 NEUROMUSCULAR REEDUCATION: CPT | Performed by: PHYSICAL THERAPIST

## 2023-10-06 PROCEDURE — 97110 THERAPEUTIC EXERCISES: CPT | Performed by: PHYSICAL THERAPIST

## 2023-10-06 NOTE — PROGRESS NOTES
Daily Note     Today's date: 10/6/2023  Patient name: Briana Soto  : 1955  MRN: [id number]  Referring provider: Louie Min DO  Dx:   Encounter Diagnosis     ICD-10-CM    1. Unsteady gait  R26.81                      Subjective: Patient without c/o prior to treatment session. Objective: See treatment diary below      Assessment: Patient demonstrated moderate challenge with addition of standing cone taps; moderate fatigue at completion of treatment session. Plan: Continue per plan of care. Precautions: Hx of endometrial CA, Gastric bypass, depression      Manuals 9/6 9/11 10/2 10/6                                                             Neuro Re-Ed             Tandem stance @ counter/bar HEP 5x10" ea. 5x10" ea. 5x10" ea. Sidestepping @ bar   Foam 1 lap Foam 1 lap         FT/EO on foam  1.5' 1.5' 2'          Biodex LOS  Static 3x Static 3x Static 3x         Biodex maze  Easy 2x Easy 2x Easy 1x med 1x         Stand cone taps     20x ea. Step ups     6" 2x10 ea. Ther Ex             Bike             Mini squats HEP 2x10 2x10 3x10         SLR flex HEP 20x3" ea. 20x3" ea. 20x3" ea.          SLR abd HEP 2x10 ea 2x10 ea 2x10 ea         Leg press  50# 3x10 50# 3x10 50# 3x10         Leg press HR  30# 3x10 30# 3x10 30# 3x10         Sitting toe raises  2x10 2x10 2x10         Bridges  20x3" 20x3" 20x3"         Ther Activity                                       Gait Training                                       Modalities

## 2023-10-09 ENCOUNTER — OFFICE VISIT (OUTPATIENT)
Dept: PHYSICAL THERAPY | Facility: CLINIC | Age: 68
End: 2023-10-09
Payer: COMMERCIAL

## 2023-10-09 DIAGNOSIS — R26.81 UNSTEADY GAIT: Primary | ICD-10-CM

## 2023-10-09 PROCEDURE — 97110 THERAPEUTIC EXERCISES: CPT | Performed by: PHYSICAL THERAPIST

## 2023-10-09 PROCEDURE — 97112 NEUROMUSCULAR REEDUCATION: CPT | Performed by: PHYSICAL THERAPIST

## 2023-10-09 NOTE — PROGRESS NOTES
Daily Note     Today's date: 10/9/2023  Patient name: Nicole Jimenez  : 1955  MRN: [id number]  Referring provider: Florecita Jiang DO  Dx:   Encounter Diagnosis     ICD-10-CM    1. Unsteady gait  R26.81                      Subjective: Patient states no pain or problems after last treatment session. Objective: See treatment diary below      Assessment: Patient demonstrated significant challenge with addition of tandem stepping on foam; no c/o at completion of treatment session. Plan: Continue per plan of care. Precautions: Hx of endometrial CA, Gastric bypass, depression      Manuals 9/6 9/11 10/2 10/6 10/9                                                            Neuro Re-Ed             Tandem stance @ counter/bar HEP 5x10" ea. 5x10" ea. 5x10" ea. 5x10" ea. Sidestepping @ bar   Foam 1 lap Foam 1 lap Foam 1 lap        Tandem stepping on foam     1 lap        FT/EO on foam  1.5' 1.5' 2'  2'        Biodex LOS  Static 3x Static 3x Static 3x Static 3x        Biodex maze  Easy 2x Easy 2x Easy 1x med 1x Med 2x        Stand cone taps     20x ea. 20x ea. Step ups     6" 2x10 ea. 6" 2x10 ea. Ther Ex             Bike             Mini squats HEP 2x10 2x10 3x10 3x10        SLR flex HEP 20x3" ea. 20x3" ea. 20x3" ea.  30x3"        SLR abd HEP 2x10 ea 2x10 ea 2x10 ea 2x15 ea        Leg press  50# 3x10 50# 3x10 50# 3x10 50# 3x10        Leg press HR  30# 3x10 30# 3x10 30# 3x10 30# 3x10        Sitting toe raises  2x10 2x10 2x10 2x10        Bridges  20x3" 20x3" 20x3" 30x3"        Ther Activity                                       Gait Training                                       Modalities

## 2023-10-12 DIAGNOSIS — N32.81 OAB (OVERACTIVE BLADDER): ICD-10-CM

## 2023-10-13 ENCOUNTER — OFFICE VISIT (OUTPATIENT)
Dept: PHYSICAL THERAPY | Facility: CLINIC | Age: 68
End: 2023-10-13
Payer: COMMERCIAL

## 2023-10-13 ENCOUNTER — APPOINTMENT (OUTPATIENT)
Dept: LAB | Facility: CLINIC | Age: 68
End: 2023-10-13
Payer: COMMERCIAL

## 2023-10-13 DIAGNOSIS — R26.81 UNSTEADY GAIT: Primary | ICD-10-CM

## 2023-10-13 DIAGNOSIS — N18.4 STAGE 4 CHRONIC KIDNEY DISEASE (HCC): ICD-10-CM

## 2023-10-13 LAB
ALBUMIN SERPL BCP-MCNC: 3.8 G/DL (ref 3.5–5)
ANION GAP SERPL CALCULATED.3IONS-SCNC: 10 MMOL/L
BUN SERPL-MCNC: 51 MG/DL (ref 5–25)
CALCIUM SERPL-MCNC: 9.1 MG/DL (ref 8.4–10.2)
CHLORIDE SERPL-SCNC: 107 MMOL/L (ref 96–108)
CO2 SERPL-SCNC: 21 MMOL/L (ref 21–32)
CREAT SERPL-MCNC: 2.48 MG/DL (ref 0.6–1.3)
GFR SERPL CREATININE-BSD FRML MDRD: 19 ML/MIN/1.73SQ M
GLUCOSE P FAST SERPL-MCNC: 93 MG/DL (ref 65–99)
PHOSPHATE SERPL-MCNC: 4.9 MG/DL (ref 2.3–4.1)
POTASSIUM SERPL-SCNC: 4.2 MMOL/L (ref 3.5–5.3)
SODIUM SERPL-SCNC: 138 MMOL/L (ref 135–147)

## 2023-10-13 PROCEDURE — 97110 THERAPEUTIC EXERCISES: CPT | Performed by: PHYSICAL THERAPIST

## 2023-10-13 PROCEDURE — 80069 RENAL FUNCTION PANEL: CPT

## 2023-10-13 PROCEDURE — 36415 COLL VENOUS BLD VENIPUNCTURE: CPT

## 2023-10-13 PROCEDURE — 97112 NEUROMUSCULAR REEDUCATION: CPT | Performed by: PHYSICAL THERAPIST

## 2023-10-13 RX ORDER — VIBEGRON 75 MG/1
1 TABLET, FILM COATED ORAL EVERY MORNING
Qty: 90 TABLET | Refills: 3 | Status: SHIPPED | OUTPATIENT
Start: 2023-10-13

## 2023-10-13 NOTE — PROGRESS NOTES
Daily Note     Today's date: 10/13/2023  Patient name: Ean Navarro  : 1955  MRN: [id number]  Referring provider: Jimmie Paredes DO  Dx:   Encounter Diagnosis     ICD-10-CM    1. Unsteady gait  R26.81                      Subjective: Patient stated moderate soreness in her right anterior hip prior to treatment session. Objective: See treatment diary below      Assessment: Patient performed progression of increased weight with leg press without c/o pain; demonstrated moderate challenge with addition of casandra stepping; demonstrated moderate fatigue at completion of treatment session. Plan: Continue per plan of care. Precautions: Hx of endometrial CA, Gastric bypass, depression      Manuals 9/6 9/11 10/2 10/6 10/9 10/13                                                           Neuro Re-Ed             Tandem stance @ counter/bar HEP 5x10" ea. 5x10" ea. 5x10" ea. 5x10" ea. 5x10" ea. Sidestepping @ bar   Foam 1 lap Foam 1 lap Foam 1 lap Foam 1 lap       Tandem stepping on foam     1 lap 1 lap       FT/EO on foam  1.5' 1.5' 2'  2' EC 1'       Biodex LOS  Static 3x Static 3x Static 3x Static 3x Static 3x       Biodex maze  Easy 2x Easy 2x Easy 1x med 1x Med 2x Med 2x       Casandra stepping      Fwd + S/S 3 laps ea       Stand cone taps     20x ea. 20x ea. 20x ea       Step ups     6" 2x10 ea. 6" 2x10 ea. 6" 2x10 ea. Ther Ex             Bike             Mini squats HEP 2x10 2x10 3x10 3x10 3x10       SLR flex HEP 20x3" ea. 20x3" ea. 20x3" ea.  30x3" 30x3"       SLR abd HEP 2x10 ea 2x10 ea 2x10 ea 2x15 ea 2x15 ea       Leg press  50# 3x10 50# 3x10 50# 3x10 50# 3x10 60# 3x10       Leg press HR  30# 3x10 30# 3x10 30# 3x10 30# 3x10 40# 3x10       Sitting toe raises  2x10 2x10 2x10 2x10 np       Bridges  20x3" 20x3" 20x3" 30x3" 30x3"       Ther Activity                                       Gait Training                                       Modalities

## 2023-10-16 ENCOUNTER — EVALUATION (OUTPATIENT)
Dept: PHYSICAL THERAPY | Facility: CLINIC | Age: 68
End: 2023-10-16
Payer: COMMERCIAL

## 2023-10-16 DIAGNOSIS — R26.81 UNSTEADY GAIT: Primary | ICD-10-CM

## 2023-10-16 DIAGNOSIS — N18.4 STAGE 4 CHRONIC KIDNEY DISEASE (HCC): Primary | ICD-10-CM

## 2023-10-16 PROCEDURE — 97530 THERAPEUTIC ACTIVITIES: CPT | Performed by: PHYSICAL THERAPIST

## 2023-10-16 NOTE — PROGRESS NOTES
PT Re-Evaluation     Today's date: 10/16/2023  Patient name: Maribell Gibbons  : 1955  MRN: [id number]  Referring provider: Emmy Ramires DO  Dx:   Encounter Diagnosis     ICD-10-CM    1. Unsteady gait  R26.81                      Assessment  Assessment details: Patient has been seen for a total of seven PT treatment sessions since initial evaluation on 23. Patient demonstrates improved bilateral LE strength, improved gait and balance, and improved functional ability since beginning PT treatment. Patient states she feels that she can perform exercises independently at home at this time. Patient will be discharged from PT treatment at this time with recommendation to continue HEP activities. Goals  Short term goals - to be achieved in 4 weeks:   Increase strength by 1/2 grade - partially met  Balance will be improved as indicated by tandem stance of at least 15 seconds - met  Long term goals - to be achieved by discharge:   Ambulation is improved to maximal level of function - met  Stair climbing is improved to maximal level of function - partially met  IADL performance in related activities is improved to maximal level of function - met  Performance in related household activities is improved to maximal level of function - met      Plan  Plan details: D/C to [facility]   Planned therapy interventions: home exercise program      Subjective Evaluation    History of Present Illness  Mechanism of injury: Patient states she feels approximately 85% improved since beginning PT treatment. Patient states some continued difficulty performing stair climbing activity. Patient states she usually uses some UE assistance during sit to stand transfers. Patient states improved ambulation ability and states she feels improved flexibility in bilateral LE's.        Objective     Functional Assessment        Comments  Strength/Myotome Testing      Left Hip   Planes of Motion   Flexion: 4+  Extension: 4  Abduction: 4  Adduction: 4+     Right Hip   Planes of Motion   Flexion: 4  Extension: 4  Abduction: 4-  Adduction: 4     Left Knee   Flexion: 4+  Extension: 5     Right Knee   Flexion: 4+  Extension: 5     Left Ankle/Foot   Dorsiflexion: 5  Plantar flexion: 4+     Right Ankle/Foot   Dorsiflexion: 4-  Plantar flexion: 4+     Comments  TU seconds  Standing feet together, firm surface, eyes closed: Mild sway  Tandem stance: 25 seconds - loss of balance              Precautions: Hx of endometrial CA, Gastric bypass, depression        Manuals 9/6 9/11 10/2 10/6 10/9 10/13  10/16         Re-eval              KK, PT                                                                                 Neuro Re-Ed                       Tandem stance @ counter/bar HEP 5x10" ea. 5x10" ea. 5x10" ea. 5x10" ea. 5x10" ea. Sidestepping @ bar     Foam 1 lap Foam 1 lap Foam 1 lap Foam 1 lap           Tandem stepping on foam         1 lap 1 lap           FT/EO on foam   1.5' 1.5' 2'  2' EC 1'           Biodex LOS   Static 3x Static 3x Static 3x Static 3x Static 3x           Biodex maze   Easy 2x Easy 2x Easy 1x med 1x Med 2x Med 2x           Stephan stepping           Fwd + S/S 3 laps ea           Stand cone taps        20x ea. 20x ea. 20x ea           Step ups        6" 2x10 ea. 6" 2x10 ea. 6" 2x10 ea. Ther Ex                       Bike                       Mini squats HEP 2x10 2x10 3x10 3x10 3x10           SLR flex HEP 20x3" ea. 20x3" ea. 20x3" ea.  30x3" 30x3"           SLR abd HEP 2x10 ea 2x10 ea 2x10 ea 2x15 ea 2x15 ea           Leg press   50# 3x10 50# 3x10 50# 3x10 50# 3x10 60# 3x10           Leg press HR   30# 3x10 30# 3x10 30# 3x10 30# 3x10 40# 3x10           Sitting toe raises   2x10 2x10 2x10 2x10 np           Bridges   20x3" 20x3" 20x3" 30x3" 30x3"           Ther Activity                                                                       Gait Training Modalities

## 2023-10-16 NOTE — RESULT ENCOUNTER NOTE
Patient has progressive CKD as discussed in previous office notes. Most recent creatinine up to 2.5. Currently asymptomatic. No dyspnea. No leg swelling. She has not noticed decreasing urine in his nephrostomy tubes. For now we will continue to monitor. Repeat renal function panel in 1 month. She has completed CKD modality education and is interested in home modalities preferably peritoneal dialysis. We will place appropriate referrals if she continues to have a decline in her GFR. She was advised to notify us if she develops dyspnea or leg swelling or decrease in urine output.

## 2023-10-18 ENCOUNTER — APPOINTMENT (OUTPATIENT)
Dept: PHYSICAL THERAPY | Facility: CLINIC | Age: 68
End: 2023-10-18
Payer: COMMERCIAL

## 2023-10-23 ENCOUNTER — HOSPITAL ENCOUNTER (OUTPATIENT)
Dept: INFUSION CENTER | Facility: CLINIC | Age: 68
Discharge: HOME | End: 2023-10-23
Payer: COMMERCIAL

## 2023-10-23 DIAGNOSIS — Z45.2 ENCOUNTER FOR VENOUS ACCESS DEVICE CARE: ICD-10-CM

## 2023-10-23 DIAGNOSIS — C54.1 ENDOMETRIAL CANCER (HCC): Primary | ICD-10-CM

## 2023-10-23 DIAGNOSIS — D47.2 MGUS (MONOCLONAL GAMMOPATHY OF UNKNOWN SIGNIFICANCE): ICD-10-CM

## 2023-10-23 DIAGNOSIS — R79.0 ABNORMAL IRON SATURATION: ICD-10-CM

## 2023-10-23 LAB
BASOPHILS # BLD AUTO: 0.04 THOUSANDS/ÂΜL (ref 0–0.1)
BASOPHILS NFR BLD AUTO: 0 % (ref 0–1)
EOSINOPHIL # BLD AUTO: 0.03 THOUSAND/ÂΜL (ref 0–0.61)
EOSINOPHIL NFR BLD AUTO: 0 % (ref 0–6)
ERYTHROCYTE [DISTWIDTH] IN BLOOD BY AUTOMATED COUNT: 13 % (ref 11.6–15.1)
HCT VFR BLD AUTO: 30.7 % (ref 34.8–46.1)
HGB BLD-MCNC: 9.3 G/DL (ref 11.5–15.4)
IMM GRANULOCYTES # BLD AUTO: 0.12 THOUSAND/UL (ref 0–0.2)
IMM GRANULOCYTES NFR BLD AUTO: 1 % (ref 0–2)
LYMPHOCYTES # BLD AUTO: 0.76 THOUSANDS/ÂΜL (ref 0.6–4.47)
LYMPHOCYTES NFR BLD AUTO: 6 % (ref 14–44)
MCH RBC QN AUTO: 28.4 PG (ref 26.8–34.3)
MCHC RBC AUTO-ENTMCNC: 30.3 G/DL (ref 31.4–37.4)
MCV RBC AUTO: 94 FL (ref 82–98)
MONOCYTES # BLD AUTO: 1.07 THOUSAND/ÂΜL (ref 0.17–1.22)
MONOCYTES NFR BLD AUTO: 8 % (ref 4–12)
NEUTROPHILS # BLD AUTO: 11.62 THOUSANDS/ÂΜL (ref 1.85–7.62)
NEUTS SEG NFR BLD AUTO: 85 % (ref 43–75)
NRBC BLD AUTO-RTO: 0 /100 WBCS
PLATELET # BLD AUTO: 286 THOUSANDS/UL (ref 149–390)
PMV BLD AUTO: 9.4 FL (ref 8.9–12.7)
RBC # BLD AUTO: 3.28 MILLION/UL (ref 3.81–5.12)
WBC # BLD AUTO: 13.64 THOUSAND/UL (ref 4.31–10.16)

## 2023-10-23 PROCEDURE — 85025 COMPLETE CBC W/AUTO DIFF WBC: CPT

## 2023-10-23 NOTE — PROGRESS NOTES
Patient arrives to infusion center for lab draw and port flush. L PAC appears WNL. L PAC accessed without issue, brisk blood return noted, labs collected and sent as per orders, flushed well without resistance. Deaccessed, bandaid in place. Patient aware to schedule next appointment upon DC.

## 2023-10-25 NOTE — ADDENDUM NOTE
Addended by: Yesica Delcid on: 8/28/2019 02:17 PM     Modules accepted: Orders Carac Counseling:  I discussed with the patient the risks of Carac including but not limited to erythema, scaling, itching, weeping, crusting, and pain.

## 2023-10-31 ENCOUNTER — TELEPHONE (OUTPATIENT)
Dept: UROLOGY | Facility: AMBULATORY SURGERY CENTER | Age: 68
End: 2023-10-31

## 2023-10-31 ENCOUNTER — OFFICE VISIT (OUTPATIENT)
Dept: UROLOGY | Facility: AMBULATORY SURGERY CENTER | Age: 68
End: 2023-10-31
Payer: COMMERCIAL

## 2023-10-31 VITALS
HEART RATE: 72 BPM | WEIGHT: 142 LBS | BODY MASS INDEX: 28.63 KG/M2 | HEIGHT: 59 IN | SYSTOLIC BLOOD PRESSURE: 140 MMHG | DIASTOLIC BLOOD PRESSURE: 80 MMHG

## 2023-10-31 DIAGNOSIS — N13.5 URETERAL STRICTURE: Primary | ICD-10-CM

## 2023-10-31 PROCEDURE — 99214 OFFICE O/P EST MOD 30 MIN: CPT | Performed by: UROLOGY

## 2023-10-31 NOTE — PROGRESS NOTES
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 Fortunately, she has no evidence of disease recurrence from her endometrial carcinoma. We essentially discussed 2 options today. The first is maintaining her nephrostomy tubes open to gravity and being nephrostomy tube dependent for life. The second is exploratory laparotomy with creation of an ileal conduit. We discussed the countless potential complications associated with that operation including injury to surrounding tissues such as bowel, blood vessels, and nerves. We discussed that an ileal conduit may not even be technically feasible with the previously radiated ureters as they may be adherent into the retroperitoneum. Then we discussed complications related to anesthesia and surgery such as blood clots, stroke and heart attack. We discussed delayed complications from surgery including strictures of the ureteral ileal anastomoses which would be treated with nephrostomy tubes which she currently has in place. Lastly, we also addressed the risk of progressive renal failure with an ileal conduit. With a baseline creatinine of 2.5 she would be at risk for progression of her chronic kidney disease and potentially at risk to go on to hemodialysis. The patient states she is scheduled to see Dr. Yesy Sosa in December 2023. I recommend that she return in January 2024 for a final decision regarding surgery versus continued nephrostomy tubes.         History of Present Illness  79 y.o. female with a history of endometrial carcinoma initially treated with hysterectomy. She then had disease recurrence and received chemoradiation. Unfortunately she has radiation injury to her distal ureters bilaterally. Both kidneys are currently obstructed and being managed with nephrostomy tubes. She has previously tried and failed management with ureteral stents alone. She returns in routine follow-up today. Her sister is present with her in the office. She continues to have both nephrostomy tubes open to gravity. Overall she states that she is doing well and has no complaints. She has chronic kidney disease with a baseline creatinine of approximately 2.5. AUA Symptom Score      Review of Systems  Review of Systems   Constitutional: Negative. HENT: Negative. Eyes: Negative. Respiratory: Negative. Cardiovascular: Negative. Gastrointestinal: Negative. Endocrine: Negative. Genitourinary:         Per HPI   Musculoskeletal: Negative. Skin: Negative. Allergic/Immunologic: Negative. Neurological: Negative. Hematological: Negative. Psychiatric/Behavioral: Negative.            Past Medical History  Past Medical History:   Diagnosis Date    Anemia     Anxiety     Cancer ([address])     ENDOMETRIAL    Chemotherapy induced neutropenia  8/30/2019    CKD (chronic kidney disease) stage 3, GFR 30-59 ml/min ([address])     COVID     Fall 2022    Depression     DVT (deep venous thrombosis) ([address]) 07/19/2019    RIGHT LEG    Endometrial cancer ([address]) 12/2017    GERD (gastroesophageal reflux disease)     H/O gastric bypass     Hard to intubate     pt denies AS OF 7/25/23    History of chemotherapy     started 12/2021endometrial cancer- done 12/23/22    History of DVT in adulthood     RLE    History of transfusion 04/13/2023    Hyperglycemia     vx type 2 dm -- last assessed 4/1/14; resolved 11/7/17    Hyperlipidemia     Hypertension     in past- no meds at present    Iron deficiency anemia     iron infusions weekly    OAB (overactive bladder)     Port-A-Cath in place     Wears glasses        Past Social History  Past Surgical History:   Procedure Laterality Date    ABDOMINAL SURGERY      GASTRIC BYPASS; 2001    BREAST BIOPSY Right 06/28/2019    core biopsy; benign    CHOLECYSTECTOMY      at the time of gastric bypass    COLONOSCOPY      CT NEEDLE BIOPSY LYMPH NODE  07/08/2019    FL GUIDED CENTRAL VENOUS ACCESS DEVICE INSERTION  11/12/2019    FL RETROGRADE PYELOGRAM  03/30/2023    FL RETROGRADE PYELOGRAM  05/09/2023    FL RETROGRADE PYELOGRAM  8/1/2023    GASTRIC BYPASS      HYSTERECTOMY Bilateral 2017    total abdominal with salpingo-oophorectomy    IR NEPHROSTOMY TO NEPHROURETERAL STENT  06/09/2022    IR NEPHROSTOMY TO NEPHROURETERAL STENT  12/20/2022    IR NEPHROSTOMY TO NEPHROURETERAL STENT  8/8/2023    IR NEPHROSTOMY TUBE CHECK/CHANGE/REPOSITION/REINSERTION/UPSIZE  05/27/2022    IR NEPHROSTOMY TUBE PLACEMENT  07/26/2019    IR NEPHROSTOMY TUBE PLACEMENT  05/27/2023    IR NEPHROURETERAL STENT CHECK/CHANGE/REPOSITION  04/06/2021    IR NEPHROURETERAL STENT CHECK/CHANGE/REPOSITION  06/04/2021    IR NEPHROURETERAL STENT CHECK/CHANGE/REPOSITION  09/03/2021    IR NEPHROURETERAL STENT CHECK/CHANGE/REPOSITION  12/07/2021    IR NEPHROURETERAL STENT CHECK/CHANGE/REPOSITION  03/08/2022    IR NEPHROURETERAL STENT CHECK/CHANGE/REPOSITION  05/10/2022    IR NEPHROURETERAL STENT CHECK/CHANGE/REPOSITION  09/19/2022    IR PICC LINE  09/27/2019    IR PORT PLACEMENT  07/26/2019    IR PORT PLACEMENT      IR PORT REMOVAL  09/20/2019    OOPHORECTOMY Bilateral 2017    MI CYSTO BLADDER W/URETERAL CATHETERIZATION Right 03/30/2023    Procedure: CYSTOSCOPY RETROGRADE PYELOGRAM WITH exchange of STENT URETERAL;  Surgeon: Sandra Cantor MD;  [facility];  Service: Urology    MI CYSTO BLADDER W/URETERAL CATHETERIZATION Bilateral 05/09/2023    Procedure: CYSTOSCOPY, BILATERAL RETROGRADE PYELOGRAM, WITH LEFT INSERTION STENT URETERAL, RIGHT Malachi Parker STENT EXCHANGE;  Surgeon: Ragini Lawrence MD;  [facility];  Service: Urology    SC CYSTO BLADDER W/URETERAL CATHETERIZATION Bilateral 8/1/2023    Procedure: CYSTOSCOPY BILATERAL RETROGRADE  WITH REMOVAL BILATERAL  STENT URETERAL;  Surgeon: Bibi Shultz MD;  [facility];  Service: Urology    SC CYSTO W/INSERT URETERAL STENT Right 03/30/2023    Procedure: EXCHANGE STENT URETERAL;  Surgeon: Bibi Shultz MD;  [facility];  Service: Urology    SC INSJ TUNNELED CTR VAD W/SUBQ PORT AGE 7 YR/> Left 11/12/2019    Procedure: INSERTION VENOUS PORT ( PORT-A-CATH) IR;  Surgeon: Greyson Haskins DO;  [facility];  Service: Interventional Radiology    SC LAPS ABD PRTM&OMENTUM DX W/WO [facility] BR/WA [facility] N/A 12/19/2017    Procedure: LAPAROSCOPY DIAGNOSTIC;  Surgeon: Mimi Aj MD;  [facility];  Service: Gynecology Oncology    SC LAPS Lidia Landau HYST W/BILAT LMPHADEC RMVL TUBE/OVARY N/A 12/19/2017    Procedure: HYSTERECTOMY LAPAROSCOPIC TOTAL ([address]) W/ ROBOTICS; BILATERAL SALPINGOOPHERECTOMY; LYMPH NODE DISSECTION; lysis of adhesions;  Surgeon: Mimi Aj MD;  [facility];  Service: Gynecology Oncology    REMOVAL URETERAL STENT Bilateral 8/1/2023    Procedure: REMOVAL STENT URETERAL;  Surgeon: Bibi Shultz MD;  [facility];  Service: Urology    TONSILLECTOMY      US GUIDED BREAST BIOPSY RIGHT COMPLETE Right 06/28/2019       Past Family History  Family History   Problem Relation Age of Onset    Hyperlipidemia Mother     Heart disease Mother     Ovarian cancer Mother 48    Colon cancer Mother     Lymphoma Father     Breast cancer Sister 61    No Known Problems Brother     No Known Problems Brother     No Known Problems Maternal Grandmother     Bone cancer Maternal Grandfather     Uterine cancer Paternal Grandmother     No Known Problems Paternal Grandfather     No Known Problems Maternal Aunt     No Known Problems Maternal Aunt No Known Problems Maternal Aunt     No Known Problems Maternal Aunt     No Known Problems Paternal Aunt     No Known Problems Paternal Aunt     No Known Problems Paternal Aunt     No Known Problems Paternal Aunt        Past Social history  Social History     Socioeconomic History    Marital status: Single     Spouse name: Not on file    Number of children: Not on file    Years of education: Not on file    Highest education level: Not on file   Occupational History    Not on file   Tobacco Use    Smoking status: Never    Smokeless tobacco: Never   Vaping Use    Vaping Use: Never used   Substance and Sexual Activity    Alcohol use: Never    Drug use: Never    Sexual activity: Not Currently   Other Topics Concern    Not on file   Social History Narrative    Not on file     Social Determinants of Health     Financial Resource Strain: Low Risk  (9/26/2023)    Overall Financial Resource Strain (CARDIA)     Difficulty of Paying Living Expenses: Not very hard   Food Insecurity: No Food Insecurity (5/30/2023)    Hunger Vital Sign     Worried About Running Out of Food in the Last Year: Never true     Ran Out of Food in the Last Year: Never true   Transportation Needs: No Transportation Needs (9/26/2023)    PRAPARE - Transportation     Lack of Transportation (Medical): No     Lack of Transportation (Non-Medical):  No   Physical Activity: Not on file   Stress: Not on file   Social Connections: Not on file   Intimate Partner Violence: Not on file   Housing Stability: Low Risk  (5/30/2023)    Housing Stability Vital Sign     Unable to Pay for Housing in the Last Year: No     Number of Places Lived in the Last Year: 1     Unstable Housing in the Last Year: No       Current Medications  Current Outpatient Medications   Medication Sig Dispense Refill    ARIPiprazole (ABILIFY) 20 MG tablet Take 20 mg by mouth every morning      BD PosiFlush 0.9 % SOLN       Calcium-Magnesium-Vitamin D (CALCIUM 500 PO) Take 1 capsule by mouth daily at bedtime      cholecalciferol (VITAMIN D3) 1,000 units tablet Take 4 tablets (4,000 Units total) by mouth daily (Patient taking differently: Take 4,000 Units by mouth daily at bedtime) 90 tablet 0    Cyanocobalamin (VITAMIN B12 PO) Take 1 capsule by mouth daily after lunch      dronabinol (MARINOL) 2.5 mg capsule Take 1 capsule (2.5 mg total) by mouth 2 (two) times a day before meals 30 capsule 0    folic acid (FOLVITE) 1 mg tablet Take 1 tablet (1 mg total) by mouth daily (Patient taking differently: Take 1 mg by mouth daily at bedtime)  0    furosemide (LASIX) 20 mg tablet TAKE 1 TABLET (20 MG TOTAL) BY MOUTH IF NEEDED (FOR INCREASED LEG SWELLING OR WEIGHT GAIN >2 LBS IN 1 DAY) 90 tablet 0    Multiple Vitamin (MULTIVITAMIN) tablet Take 1 tablet by mouth every morning      omeprazole (PriLOSEC) 20 mg delayed release capsule Take 20 mg by mouth every morning      oxybutynin (DITROPAN XL) 15 MG 24 hr tablet Take 1 tablet (15 mg total) by mouth daily at bedtime 90 tablet 3    saccharomyces boulardii (FLORASTOR) 250 mg capsule Take 1 capsule (250 mg total) by mouth daily after lunch      senna (SENOKOT) 8.6 MG tablet Take 1 tablet (8.6 mg total) by mouth 2 (two) times a day as needed for constipation 60 tablet 0    sodium bicarbonate 650 mg tablet Take 1 tablet (650 mg total) by mouth 3 (three) times a day 270 tablet 3    sodium chloride, PF, 0.9 % 10 mL by Intracatheter route daily Intracatheter flushing daily. May substitute prefilled syringe with normal saline 10 mL vials, 10 mL syringes, and 18 g blunt needles 300 mL 2    venlafaxine (EFFEXOR-XR) 75 mg 24 hr capsule TAKE 3 CAPS DAILY      Vibegron (Gemtesa) 75 MG TABS Take 1 capsule by mouth every morning 90 tablet 3     No current facility-administered medications for this visit.        Allergies  Allergies   Allergen Reactions    Cephalosporins Rash     Which Cephalosporin reaction was to not specified; however, has tolerated Amoxicillin, Cefazolin, and Cefepime Past Medical History, Social History, Family History, medications and allergies were reviewed. Vitals  Vitals:    10/31/23 1529   BP: 140/80   BP Location: Left arm   Patient Position: Sitting   Cuff Size: Adult   Pulse: 72   Weight: 64.4 kg (142 lb)   Height: 4' 11" (1.499 m)       Physical Exam  Physical Exam    On examination she is in no acute distress. Bilateral nephrostomy tubes in place.   Gait normal.  Affect normal    Results  No results found for: "PSA"  Lab Results   Component Value Date    GLUCOSE 219 (H) 12/19/2017    CALCIUM 9.1 10/13/2023     10/27/2017    K 4.2 10/13/2023    CO2 21 10/13/2023     10/13/2023    BUN 51 (H) 10/13/2023    CREATININE 2.48 (H) 10/13/2023     Lab Results   Component Value Date    WBC 13.64 (H) 10/23/2023    HGB 9.3 (L) 10/23/2023    HCT 30.7 (L) 10/23/2023    MCV 94 10/23/2023     10/23/2023         Office Urine Dip  No results found for this or any previous visit (from the past 1 hour(s)).]

## 2023-10-31 NOTE — TELEPHONE ENCOUNTER
Looking for appropriate slot, FT next available FU is may. Please advise if this or which other slot pt should be scheduled for. Provider note:  Return for Jan 2024 FT f/u .

## 2023-10-31 NOTE — LETTER
October 31, 2023     Aliza Vergara, DO  [address] [address]    Patient: Ro Childs   YOB: 1955   Date of Visit: 10/31/2023       Dear Dr. Rishi Banegas:    Thank you for referring Ro Childs to me for evaluation. Below are my notes for this consultation. If you have questions, please do not hesitate to call me. I look forward to following your patient along with you. Sincerely,        Waleska Roger MD        CC: MD Waleska Valdez MD  10/31/2023  4:01 PM  Incomplete  10/31/2023    Ragini Al  1955  [id number]        Assessment  History of endometrial carcinoma status post hysterectomy with recurrence treated with chemoradiation, history of bilateral distal ureteral strictures with bilateral nephrostomy tube dependency, chronic kidney disease      Discussion  Again I had a lengthy discussion with the patient and her sister in the office today regarding her bilateral distal ureteral strictures which are most likely secondary to radiation therapy. Fortunately, she has no evidence of disease recurrence from her endometrial carcinoma. We essentially discussed 2 options today. The first is maintaining her nephrostomy tubes open to gravity and being nephrostomy tube dependent for life. The second is exploratory laparotomy with creation of an ileal conduit. We discussed the countless potential complications associated with that operation including injury to surrounding tissues such as bowel, blood vessels, and nerves. We discussed that an ileal conduit may not even be technically feasible with the previously radiated ureters as they may be adherent into the retroperitoneum. Then we discussed complications related to anesthesia and surgery such as blood clots, stroke and heart attack.   We discussed delayed complications from surgery including strictures of the ureteral ileal anastomoses which would be treated with nephrostomy tubes which she currently has in place. Lastly, we also addressed the risk of progressive renal failure with an ileal conduit. With a baseline creatinine of 2.5 she would be at risk for progression of her chronic kidney disease and potentially at risk to go on to hemodialysis. The patient states she is scheduled to see Dr. Melissa Ortiz in December 2023. I recommend that she return in January 2024 for a final decision regarding surgery versus continued nephrostomy tubes. History of Present Illness  79 y.o. female with a history of endometrial carcinoma initially treated with hysterectomy. She then had disease recurrence and received chemoradiation. Unfortunately she has radiation injury to her distal ureters bilaterally. Both kidneys are currently obstructed and being managed with nephrostomy tubes. She has previously tried and failed management with ureteral stents alone. She returns in routine follow-up today. Her sister is present with her in the office. She continues to have both nephrostomy tubes open to gravity. Overall she states that she is doing well and has no complaints. She has chronic kidney disease with a baseline creatinine of approximately 2.5. AUA Symptom Score      Review of Systems  Review of Systems   Constitutional: Negative. HENT: Negative. Eyes: Negative. Respiratory: Negative. Cardiovascular: Negative. Gastrointestinal: Negative. Endocrine: Negative. Genitourinary:         Per HPI   Musculoskeletal: Negative. Skin: Negative. Allergic/Immunologic: Negative. Neurological: Negative. Hematological: Negative. Psychiatric/Behavioral: Negative.            Past Medical History  Past Medical History:   Diagnosis Date   • Anemia    • Anxiety    • Cancer ([address])     ENDOMETRIAL   • Chemotherapy induced neutropenia  8/30/2019   • CKD (chronic kidney disease) stage 3, GFR 30-59 ml/min ([facility])    • COVID     Fall 2022   • Depression    • DVT (deep venous thrombosis) ([address]) 07/19/2019    RIGHT LEG   • Endometrial cancer ([address]) 12/2017   • GERD (gastroesophageal reflux disease)    • H/O gastric bypass    • Hard to intubate     pt denies AS OF 7/25/23   • History of chemotherapy     started 12/2021endometrial cancer- done 12/23/22   • History of DVT in adulthood     RLE   • History of transfusion 04/13/2023   • Hyperglycemia     vx type 2 dm -- last assessed 4/1/14; resolved 11/7/17   • Hyperlipidemia    • Hypertension     in past- no meds at present   • Iron deficiency anemia     iron infusions weekly   • OAB (overactive bladder)    • Port-A-Cath in place    • Wears glasses        Past Social History  Past Surgical History:   Procedure Laterality Date   • ABDOMINAL SURGERY      GASTRIC BYPASS; 2001   • BREAST BIOPSY Right 06/28/2019    core biopsy; benign   • CHOLECYSTECTOMY      at the time of gastric bypass   • COLONOSCOPY     • CT NEEDLE BIOPSY LYMPH NODE  07/08/2019   • FL GUIDED CENTRAL VENOUS ACCESS DEVICE INSERTION  11/12/2019   • FL RETROGRADE PYELOGRAM  03/30/2023   • FL RETROGRADE PYELOGRAM  05/09/2023   • FL RETROGRADE PYELOGRAM  8/1/2023   • GASTRIC BYPASS     • HYSTERECTOMY Bilateral 2017    total abdominal with salpingo-oophorectomy   • IR NEPHROSTOMY TO NEPHROURETERAL STENT  06/09/2022   • IR NEPHROSTOMY TO NEPHROURETERAL STENT  12/20/2022   • IR NEPHROSTOMY TO NEPHROURETERAL STENT  8/8/2023   • IR NEPHROSTOMY TUBE CHECK/CHANGE/REPOSITION/REINSERTION/UPSIZE  05/27/2022   • IR NEPHROSTOMY TUBE PLACEMENT  07/26/2019   • IR NEPHROSTOMY TUBE PLACEMENT  05/27/2023   • IR NEPHROURETERAL STENT CHECK/CHANGE/REPOSITION  04/06/2021   • IR NEPHROURETERAL STENT CHECK/CHANGE/REPOSITION  06/04/2021   • IR NEPHROURETERAL STENT CHECK/CHANGE/REPOSITION  09/03/2021   • IR NEPHROURETERAL STENT CHECK/CHANGE/REPOSITION  12/07/2021   • IR NEPHROURETERAL STENT CHECK/CHANGE/REPOSITION  03/08/2022   • IR NEPHROURETERAL STENT CHECK/CHANGE/REPOSITION  05/10/2022   • IR NEPHROURETERAL STENT CHECK/CHANGE/REPOSITION  09/19/2022   • IR PICC LINE  09/27/2019   • IR PORT PLACEMENT  07/26/2019   • IR PORT PLACEMENT     • IR PORT REMOVAL  09/20/2019   • OOPHORECTOMY Bilateral 2017   • GA CYSTO BLADDER W/URETERAL CATHETERIZATION Right 03/30/2023    Procedure: CYSTOSCOPY RETROGRADE PYELOGRAM WITH exchange of STENT URETERAL;  Surgeon: Isaac Villarreal MD;  [facility];  Service: Urology   • GA CYSTO BLADDER W/URETERAL CATHETERIZATION Bilateral 05/09/2023    Procedure: CYSTOSCOPY, BILATERAL RETROGRADE PYELOGRAM, WITH LEFT INSERTION STENT URETERAL, RIGHT URTERAL STENT EXCHANGE;  Surgeon: Alfredito Carrizales MD;  [facility];  Service: Urology   • GA CYSTO BLADDER W/URETERAL CATHETERIZATION Bilateral 8/1/2023    Procedure: CYSTOSCOPY BILATERAL RETROGRADE  WITH REMOVAL BILATERAL  STENT URETERAL;  Surgeon: Isaac Villarreal MD;  [facility];  Service: Urology   • GA CYSTO W/INSERT URETERAL STENT Right 03/30/2023    Procedure: EXCHANGE STENT URETERAL;  Surgeon: Isaac Villarreal MD;  [facility];  Service: Urology   • GA INSJ TUNNELED CTR VAD W/SUBQ PORT AGE 5 YR/> Left 11/12/2019    Procedure: INSERTION VENOUS PORT ( PORT-A-CATH) IR;  Surgeon: Angie Ballard DO;  [facility];  Service: Interventional Radiology   • GA LAPS ABD PRTM&OMENTUM DX W/WO Port \[facility] [place] [facility]\"" BR/WA [facility] N/A 12/19/2017    Procedure: LAPAROSCOPY DIAGNOSTIC;  Surgeon: Heath Toure MD;  [facility];  Service: Gynecology Oncology   • GA LAPS W/RAD HYST W/BILAT LMPHADEC RMVL TUBE/OVARY N/A 12/19/2017    Procedure: HYSTERECTOMY LAPAROSCOPIC TOTAL ([address]) W/ ROBOTICS; BILATERAL SALPINGOOPHERECTOMY; LYMPH NODE DISSECTION; lysis of adhesions;  Surgeon: Heath Toure MD;  [facility];  Service: Gynecology Oncology   • REMOVAL URETERAL STENT Bilateral 8/1/2023    Procedure: REMOVAL STENT URETERAL;  Surgeon: Isaac Villarreal MD;  [facility]; Service: Urology   • TONSILLECTOMY     • US GUIDED BREAST BIOPSY RIGHT COMPLETE Right 06/28/2019       Past Family History  Family History   Problem Relation Age of Onset   • Hyperlipidemia Mother    • Heart disease Mother    • Ovarian cancer Mother 48   • Colon cancer Mother    • Lymphoma Father    • Breast cancer Sister 61   • No Known Problems Brother    • No Known Problems Brother    • No Known Problems Maternal Grandmother    • Bone cancer Maternal Grandfather    • Uterine cancer Paternal Grandmother    • No Known Problems Paternal Grandfather    • No Known Problems Maternal Aunt    • No Known Problems Maternal Aunt    • No Known Problems Maternal Aunt    • No Known Problems Maternal Aunt    • No Known Problems Paternal Aunt    • No Known Problems Paternal Aunt    • No Known Problems Paternal Aunt    • No Known Problems Paternal Aunt        Past Social history  Social History     Socioeconomic History   • Marital status: Single     Spouse name: Not on file   • Number of children: Not on file   • Years of education: Not on file   • Highest education level: Not on file   Occupational History   • Not on file   Tobacco Use   • Smoking status: Never   • Smokeless tobacco: Never   Vaping Use   • Vaping Use: Never used   Substance and Sexual Activity   • Alcohol use: Never   • Drug use: Never   • Sexual activity: Not Currently   Other Topics Concern   • Not on file   Social History Narrative   • Not on file     Social Determinants of Health     Financial Resource Strain: Low Risk  (9/26/2023)    Overall Financial Resource Strain (CARDIA)    • Difficulty of Paying Living Expenses: Not very hard   Food Insecurity: No Food Insecurity (5/30/2023)    Hunger Vital Sign    • Worried About Running Out of Food in the Last Year: Never true    • Ran Out of Food in the Last Year: Never true   Transportation Needs: No Transportation Needs (9/26/2023)    PRAPARE - Transportation    • Lack of Transportation (Medical):  No    • Lack of Transportation (Non-Medical):  No   Physical Activity: Not on file   Stress: Not on file   Social Connections: Not on file   Intimate Partner Violence: Not on file   Housing Stability: Low Risk  (5/30/2023)    Housing Stability Vital Sign    • Unable to Pay for Housing in the Last Year: No    • Number of Places Lived in the Last Year: 1    • Unstable Housing in the Last Year: No       Current Medications  Current Outpatient Medications   Medication Sig Dispense Refill   • ARIPiprazole (ABILIFY) 20 MG tablet Take 20 mg by mouth every morning     • BD PosiFlush 0.9 % SOLN      • Calcium-Magnesium-Vitamin D (CALCIUM 500 PO) Take 1 capsule by mouth daily at bedtime     • cholecalciferol (VITAMIN D3) 1,000 units tablet Take 4 tablets (4,000 Units total) by mouth daily (Patient taking differently: Take 4,000 Units by mouth daily at bedtime) 90 tablet 0   • Cyanocobalamin (VITAMIN B12 PO) Take 1 capsule by mouth daily after lunch     • dronabinol (MARINOL) 2.5 mg capsule Take 1 capsule (2.5 mg total) by mouth 2 (two) times a day before meals 30 capsule 0   • folic acid (FOLVITE) 1 mg tablet Take 1 tablet (1 mg total) by mouth daily (Patient taking differently: Take 1 mg by mouth daily at bedtime)  0   • furosemide (LASIX) 20 mg tablet TAKE 1 TABLET (20 MG TOTAL) BY MOUTH IF NEEDED (FOR INCREASED LEG SWELLING OR WEIGHT GAIN >2 LBS IN 1 DAY) 90 tablet 0   • Multiple Vitamin (MULTIVITAMIN) tablet Take 1 tablet by mouth every morning     • omeprazole (PriLOSEC) 20 mg delayed release capsule Take 20 mg by mouth every morning     • oxybutynin (DITROPAN XL) 15 MG 24 hr tablet Take 1 tablet (15 mg total) by mouth daily at bedtime 90 tablet 3   • saccharomyces boulardii (FLORASTOR) 250 mg capsule Take 1 capsule (250 mg total) by mouth daily after lunch     • senna (SENOKOT) 8.6 MG tablet Take 1 tablet (8.6 mg total) by mouth 2 (two) times a day as needed for constipation 60 tablet 0   • sodium bicarbonate 650 mg tablet Take 1 tablet (650 mg total) by mouth 3 (three) times a day 270 tablet 3   • sodium chloride, PF, 0.9 % 10 mL by Intracatheter route daily Intracatheter flushing daily. May substitute prefilled syringe with normal saline 10 mL vials, 10 mL syringes, and 18 g blunt needles 300 mL 2   • venlafaxine (EFFEXOR-XR) 75 mg 24 hr capsule TAKE 3 CAPS DAILY     • Vibegron (Gemtesa) 75 MG TABS Take 1 capsule by mouth every morning 90 tablet 3     No current facility-administered medications for this visit. Allergies  Allergies   Allergen Reactions   • Cephalosporins Rash     Which Cephalosporin reaction was to not specified; however, has tolerated Amoxicillin, Cefazolin, and Cefepime       Past Medical History, Social History, Family History, medications and allergies were reviewed. Vitals  Vitals:    10/31/23 1529   BP: 140/80   BP Location: Left arm   Patient Position: Sitting   Cuff Size: Adult   Pulse: 72   Weight: 64.4 kg (142 lb)   Height: 4' 11" (1.499 m)       Physical Exam  Physical Exam    On examination she is in no acute distress. Bilateral nephrostomy tubes in place.   Gait normal.  Affect normal    Results  No results found for: "PSA"  Lab Results   Component Value Date    GLUCOSE 219 (H) 12/19/2017    CALCIUM 9.1 10/13/2023     10/27/2017    K 4.2 10/13/2023    CO2 21 10/13/2023     10/13/2023    BUN 51 (H) 10/13/2023    CREATININE 2.48 (H) 10/13/2023     Lab Results   Component Value Date    WBC 13.64 (H) 10/23/2023    HGB 9.3 (L) 10/23/2023    HCT 30.7 (L) 10/23/2023    MCV 94 10/23/2023     10/23/2023         Office Urine Dip  No results found for this or any previous visit (from the past 1 hour(s)).]

## 2023-11-01 ENCOUNTER — TELEPHONE (OUTPATIENT)
Dept: GASTROENTEROLOGY | Facility: CLINIC | Age: 68
End: 2023-11-01

## 2023-11-01 ENCOUNTER — CONSULT (OUTPATIENT)
Dept: GASTROENTEROLOGY | Facility: CLINIC | Age: 68
End: 2023-11-01
Payer: COMMERCIAL

## 2023-11-01 VITALS
DIASTOLIC BLOOD PRESSURE: 70 MMHG | SYSTOLIC BLOOD PRESSURE: 121 MMHG | BODY MASS INDEX: 28.63 KG/M2 | HEART RATE: 64 BPM | HEIGHT: 59 IN | WEIGHT: 142 LBS

## 2023-11-01 DIAGNOSIS — Z12.11 COLON CANCER SCREENING: Primary | ICD-10-CM

## 2023-11-01 DIAGNOSIS — R13.10 DYSPHAGIA, UNSPECIFIED TYPE: ICD-10-CM

## 2023-11-01 DIAGNOSIS — K21.9 GASTROESOPHAGEAL REFLUX DISEASE, UNSPECIFIED WHETHER ESOPHAGITIS PRESENT: ICD-10-CM

## 2023-11-01 DIAGNOSIS — K59.00 CONSTIPATION, UNSPECIFIED CONSTIPATION TYPE: ICD-10-CM

## 2023-11-01 DIAGNOSIS — Z98.84 HISTORY OF ROUX-EN-Y GASTRIC BYPASS: ICD-10-CM

## 2023-11-01 DIAGNOSIS — D50.8 IRON DEFICIENCY ANEMIA SECONDARY TO INADEQUATE DIETARY IRON INTAKE: ICD-10-CM

## 2023-11-01 PROCEDURE — 99204 OFFICE O/P NEW MOD 45 MIN: CPT | Performed by: NURSE PRACTITIONER

## 2023-11-01 RX ORDER — POLYETHYLENE GLYCOL 3350, SODIUM CHLORIDE, SODIUM BICARBONATE, POTASSIUM CHLORIDE 420; 11.2; 5.72; 1.48 G/4L; G/4L; G/4L; G/4L
4000 POWDER, FOR SOLUTION ORAL ONCE
Qty: 4000 ML | Refills: 0 | Status: SHIPPED | OUTPATIENT
Start: 2023-11-01 | End: 2023-11-01

## 2023-11-01 NOTE — TELEPHONE ENCOUNTER
ASC Screening    ASC Screening  BMI > than 45: No  Are you currently pregnant?: No  Do you rely on a wheelchair for mobility?: No  Do you need oxygen during the day?: No  Have you ever been informed by anesthesia that you have a difficult airway?: Yes  Have you been diagnosed with End Stage Renal Disease (ESRD)?: Yes  Are you actively on dialysis?: No  Have you been diagnosed with Pulmonary Hypertension?: No  Do you have a pacemaker or an Automatic Implantable Cardioverter Defibrillator (AICD)?: No  Have you ever had an organ transplant?: No  Have you had a stroke, heart attack, myocardial infarction (MI) within the last 6 months?: No  Have you ever been diagnosed with Aortic Stenosis?: No  Have you ever been diagnosed  with Congestive Heart Failure?: No  Have you ever been diagnosed with a heart valve disease?: Yes  Are you Diabetic?: No  If you are Diabetic, has your A1C been greater than 12 within the last six months?: N/A

## 2023-11-01 NOTE — PROGRESS NOTES
[facility] - Outpatient Consultation  Ragini Melendez 79 y.o. female MRN: [id number]  Encounter: [id number]          ASSESSMENT AND PLAN:      1. Colon cancer screening  Pt hasn't had colonoscopy due to treatment of endometrial cancer c/b ureteral strictures/CKD likely from radiation s/p bilateral nephrostomy tubes. She is now in remission. Will schedule colonoscopy with Trilyte prep. Prep and procedure explained. -     Ambulatory Referral to Gastroenterology  -     Colonoscopy; Future; Expected date: 11/01/2023  -     polyethylene glycol-electrolytes (TriLyte) 4000 mL solution; Take 4,000 mL by mouth once for 1 dose Take 4000 mL by mouth once for 1 dose. Use as directed    2. Constipation, unspecified constipation type  Reports BM every 2 days with sticky type stool and some urgency. Will start on a daily regimen of Miralax and she will work with her nutritionist to increase dietary fiber. 3. Dysphagia, unspecified type  Reports rare dysphagia with solids feeling as though they get stuck in her upper esophagus. Suspect this may be secondary to dry mouth from chemotherapy, will obtain EGD for further evaluation. Prior endoscopy in 2019 was benign.  -     EGD; Future; Expected date: 11/01/2023    4. Gastroesophageal reflux disease, unspecified whether esophagitis present  Currently asymptomatic on Prilosec 20mg daily. She lost a lot of weight during cancer treatment and is wondering if she can come off the medication. Her last EGD was in 2019 due to inability to do TRINH shich showed findings c/w previous gastric bypass surgery, no mucosal lesion, nor obstruction in the esophagus.  -Discussed with patient she can wean by taking Prilosec every other day for a week and then stopping. She can use Pepcid as needed going forward if successful at weaning.  -     EGD; Future; Expected date: 11/01/2023    5. History of Edgardo-en-Y gastric bypass  6.  Iron deficiency anemia secondary to inadequate dietary iron intake  Likely from hx RNYGB and malabsorption and chronic disease CKD. Hgb stable from previous baseline around 9 on most recent labs. She denies any black/bloody stool. She is on OTC iron supplementation. Will evaluate for any source of occult GI blood loss with EGD/Colonoscopy. -     Colonoscopy; Future; Expected date: 11/01/2023  -     EGD; Future; Expected date: 11/01/2023        ______________________________________________________________________    HPI:  Maribell Gibbons is a 79 y.o. female endometrial cancer now in remission, CKD, bilateral nephrostomy, RNYGB (2001), GERD, iron deficiency anemia on OTC iron supplementation, cholecystectomy, hysterectomy who presents to establish care for colon cancer screening. On Prilosec 20 mg daily for GERD. Denies any reflux symptoms and wondering if she still needs to be on it. Has lost quite a bit of weight with cancer treatment which subsequently helped her reflux symptoms. Intermittent dysphagia with solids over the last couple months, pretty rare, just feels like things get stuck in the upper esophagus. Denies nausea/vomiting, upper abdominal pain, dark tarry stool. Moves her bowels every 2 days and then can have some urgency with sticky stool. Denies any hard BM or rectal bleeding. Denies family history of colon cancer. REVIEW OF SYSTEMS:    CONSTITUTIONAL: Denies any fever, chills, rigors, and weight loss. HEENT: No earache or tinnitus. CARDIOVASCULAR: No chest pain or palpitations. RESPIRATORY: Denies any cough, hemoptysis, shortness of breath or dyspnea on exertion. GASTROINTESTINAL: As noted in the History of Present Illness. GENITOURINARY: Denies any hematuria or dysuria. NEUROLOGIC: No dizziness or vertigo. MUSCULOSKELETAL: Denies any joint swellings. SKIN: Denies skin rashes or itching. ENDOCRINE: Denies excessive thirst. Denies intolerance to heat or cold.   PSYCHOSOCIAL: Denies depression or anxiety. Denies any recent memory loss.        Historical Information   Past Medical History:   Diagnosis Date    Anemia     Anxiety     Cancer ([address])     ENDOMETRIAL    Chemotherapy induced neutropenia  8/30/2019    CKD (chronic kidney disease) stage 3, GFR 30-59 ml/min ([address])     COVID     Fall 2022    Depression     DVT (deep venous thrombosis) ([address]) 07/19/2019    RIGHT LEG    Endometrial cancer ([address]) 12/2017    GERD (gastroesophageal reflux disease)     H/O gastric bypass     Hard to intubate     pt denies AS OF 7/25/23    History of chemotherapy     started 12/2021endometrial cancer- done 12/23/22    History of DVT in adulthood     RLE    History of transfusion 04/13/2023    Hyperglycemia     vx type 2 dm -- last assessed 4/1/14; resolved 11/7/17    Hyperlipidemia     Hypertension     in past- no meds at present    Iron deficiency anemia     iron infusions weekly    OAB (overactive bladder)     Port-A-Cath in place     Wears glasses      Past Surgical History:   Procedure Laterality Date    ABDOMINAL SURGERY      GASTRIC BYPASS; 2001    BREAST BIOPSY Right 06/28/2019    core biopsy; benign    CHOLECYSTECTOMY      at the time of gastric bypass    COLONOSCOPY      CT NEEDLE BIOPSY LYMPH NODE  07/08/2019    FL GUIDED CENTRAL VENOUS ACCESS DEVICE INSERTION  11/12/2019    FL RETROGRADE PYELOGRAM  03/30/2023    FL RETROGRADE PYELOGRAM  05/09/2023    FL RETROGRADE PYELOGRAM  8/1/2023    GASTRIC BYPASS      HYSTERECTOMY Bilateral 2017    total abdominal with salpingo-oophorectomy    IR NEPHROSTOMY TO NEPHROURETERAL STENT  06/09/2022    IR NEPHROSTOMY TO NEPHROURETERAL STENT  12/20/2022    IR NEPHROSTOMY TO NEPHROURETERAL STENT  8/8/2023    IR NEPHROSTOMY TUBE CHECK/CHANGE/REPOSITION/REINSERTION/UPSIZE  05/27/2022    IR NEPHROSTOMY TUBE PLACEMENT  07/26/2019    IR NEPHROSTOMY TUBE PLACEMENT  05/27/2023    IR NEPHROURETERAL STENT CHECK/CHANGE/REPOSITION  04/06/2021    IR NEPHROURETERAL STENT CHECK/CHANGE/REPOSITION 06/04/2021    IR NEPHROURETERAL STENT CHECK/CHANGE/REPOSITION  09/03/2021    IR NEPHROURETERAL STENT CHECK/CHANGE/REPOSITION  12/07/2021    IR NEPHROURETERAL STENT CHECK/CHANGE/REPOSITION  03/08/2022    IR NEPHROURETERAL STENT CHECK/CHANGE/REPOSITION  05/10/2022    IR NEPHROURETERAL STENT CHECK/CHANGE/REPOSITION  09/19/2022    IR PICC LINE  09/27/2019    IR PORT PLACEMENT  07/26/2019    IR PORT PLACEMENT      IR PORT REMOVAL  09/20/2019    OOPHORECTOMY Bilateral 2017    VT CYSTO BLADDER W/URETERAL CATHETERIZATION Right 03/30/2023    Procedure: CYSTOSCOPY RETROGRADE PYELOGRAM WITH exchange of STENT URETERAL;  Surgeon: Rj Ortega MD;  [facility];  Service: Urology    VT CYSTO BLADDER W/URETERAL CATHETERIZATION Bilateral 05/09/2023    Procedure: CYSTOSCOPY, BILATERAL RETROGRADE PYELOGRAM, WITH LEFT INSERTION STENT URETERAL, RIGHT URTERAL STENT EXCHANGE;  Surgeon: Mikala Romero MD;  [facility];  Service: Urology    VT CYSTO BLADDER W/URETERAL CATHETERIZATION Bilateral 8/1/2023    Procedure: CYSTOSCOPY BILATERAL RETROGRADE  WITH REMOVAL BILATERAL  STENT URETERAL;  Surgeon: Rj Ortega MD;  [facility];  Service: Urology    VT CYSTO W/INSERT URETERAL STENT Right 03/30/2023    Procedure: EXCHANGE STENT URETERAL;  Surgeon: Rj Ortega MD;  [facility];  Service: Urology    VT INSJ TUNNELED CTR VAD W/SUBQ PORT AGE 5 YR/> Left 11/12/2019    Procedure: INSERTION VENOUS PORT ( PORT-A-CATH) IR;  Surgeon: Janet Gardner DO;  [facility];  Service: Interventional Radiology    VT LAPS ABD PRTM&OMENTUM DX W/WO SPEC BR/WA SPX N/A 12/19/2017    Procedure: LAPAROSCOPY DIAGNOSTIC;  Surgeon: Bam Ingram MD;  [facility];  Service: Gynecology Oncology    VT LAPS Gerlene Remak HYST W/BILAT LMPHADEC RMVL TUBE/OVARY N/A 12/19/2017    Procedure: HYSTERECTOMY LAPAROSCOPIC TOTAL ([address] [facility]) W/ ROBOTICS; BILATERAL SALPINGOOPHERECTOMY; LYMPH NODE DISSECTION; lysis of adhesions;  Surgeon: Alonzo Rincon MD;  [facility];  Service: Gynecology Oncology    REMOVAL URETERAL STENT Bilateral 8/1/2023    Procedure: REMOVAL STENT URETERAL;  Surgeon: Dilan Lacey MD;  [facility];  Service: Urology    TONSILLECTOMY      US GUIDED BREAST BIOPSY RIGHT COMPLETE Right 06/28/2019     Social History   Social History     Substance and Sexual Activity   Alcohol Use Never     Social History     Substance and Sexual Activity   Drug Use Never     Social History     Tobacco Use   Smoking Status Never   Smokeless Tobacco Never     Family History   Problem Relation Age of Onset    Hyperlipidemia Mother     Heart disease Mother     Ovarian cancer Mother 48    Colon cancer Mother     Lymphoma Father     Breast cancer Sister 61    No Known Problems Brother     No Known Problems Brother     No Known Problems Maternal Grandmother     Bone cancer Maternal Grandfather     Uterine cancer Paternal Grandmother     No Known Problems Paternal Grandfather     No Known Problems Maternal Aunt     No Known Problems Maternal Aunt     No Known Problems Maternal Aunt     No Known Problems Maternal Aunt     No Known Problems Paternal Aunt     No Known Problems Paternal Aunt     No Known Problems Paternal Aunt     No Known Problems Paternal Aunt        Meds/Allergies       Current Outpatient Medications:     ARIPiprazole (ABILIFY) 20 MG tablet    BD PosiFlush 0.9 % SOLN    Calcium-Magnesium-Vitamin D (CALCIUM 500 PO)    cholecalciferol (VITAMIN D3) 1,000 units tablet    Cyanocobalamin (VITAMIN B12 PO)    dronabinol (MARINOL) 2.5 mg capsule    folic acid (FOLVITE) 1 mg tablet    furosemide (LASIX) 20 mg tablet    Multiple Vitamin (MULTIVITAMIN) tablet    omeprazole (PriLOSEC) 20 mg delayed release capsule    oxybutynin (DITROPAN XL) 15 MG 24 hr tablet    polyethylene glycol-electrolytes (TriLyte) 4000 mL solution    saccharomyces boulardii (FLORASTOR) 250 mg capsule senna (SENOKOT) 8.6 MG tablet    sodium bicarbonate 650 mg tablet    sodium chloride, PF, 0.9 %    venlafaxine (EFFEXOR-XR) 75 mg 24 hr capsule    Vibegron (Gemtesa) 75 MG TABS    Allergies   Allergen Reactions    Cephalosporins Rash     Which Cephalosporin reaction was to not specified; however, has tolerated Amoxicillin, Cefazolin, and Cefepime           Objective     Blood pressure 121/70, pulse 64, height 4' 11" (1.499 m), weight 64.4 kg (142 lb), not currently breastfeeding. Body mass index is 28.68 kg/m². PHYSICAL EXAM:      General Appearance:   Alert, cooperative, no distress   HEENT:   Normocephalic, atraumatic, anicteric. Neck:  Supple, symmetrical, trachea midline   Lungs:   Clear to auscultation bilaterally; no rales, rhonchi or wheezing; respirations unlabored    Heart[de-identified]   Regular rate and rhythm; no murmur. Abdomen:   Soft, non-tender, non-distended; normal bowel sounds; no masses, no organomegaly    Genitalia:   Deferred    Rectal:   Deferred    Extremities:  No cyanosis, clubbing or edema    Skin:  No jaundice, rashes, or lesions    Lymph nodes:  No palpable cervical lymphadenopathy        Lab Results:   No visits with results within 1 Day(s) from this visit.    Latest known visit with results is:   Hospital Outpatient Visit on 10/23/2023   Component Date Value    WBC 10/23/2023 13.64 (H)     RBC 10/23/2023 3.28 (L)     Hemoglobin 10/23/2023 9.3 (L)     Hematocrit 10/23/2023 30.7 (L)     MCV 10/23/2023 94     MCH 10/23/2023 28.4     MCHC 10/23/2023 30.3 (L)     RDW 10/23/2023 13.0     MPV 10/23/2023 9.4     Platelets 51/07/7743 286     nRBC 10/23/2023 0     Neutrophils Relative 10/23/2023 85 (H)     Immat GRANS % 10/23/2023 1     Lymphocytes Relative 10/23/2023 6 (L)     Monocytes Relative 10/23/2023 8     Eosinophils Relative 10/23/2023 0     Basophils Relative 10/23/2023 0     Neutrophils Absolute 10/23/2023 11.62 (H)     Immature Grans Absolute 10/23/2023 0.12     Lymphocytes Absolute 10/23/2023 0.76     Monocytes Absolute 10/23/2023 1.07     Eosinophils Absolute 10/23/2023 0.03     Basophils Absolute 10/23/2023 0.04          Radiology Results:   No results found.

## 2023-11-01 NOTE — PATIENT INSTRUCTIONS
Increase your dietary fiber, make sure you stay well hydrated  Start Miralax 17g daily OTC  You can try to wean the Prilosec by taking every other day for 1 week then stop    You have been prescribed miralax (polyethylene glycol) for treatment of your CONSTIPATION. Start 1 capful daily. Take this dose x 3 days. If your symptoms are improved, great! Continue this dose daily. If you have diarrhea (stools are loose, associated with accidents, or urgency) with this dose, cut down to 1/2 capful daily. Continue to titrate down until you find the dose for you. This might be 1 tbsp daily. Wait 3 days before making a change. If you have continued constipation with 1 capful daily, you may need a higher dose. Start with 1.5 capfuls daily and titrate upward. Eg might need 1 capful twice daily. There is no maximum dose, whatever works for you. Again, wait 3 days before making a change. High Fiber Diet   WHAT YOU NEED TO KNOW:   A high-fiber diet includes foods that have a high amount of fiber. Fiber is the part of fruits, vegetables, and grains that is not broken down by your body. Fiber keeps your bowel movements regular. Fiber can also help lower your cholesterol level, control blood sugar in people with diabetes, and relieve constipation. Fiber can also help you control your weight because it helps you feel full faster. Most adults should eat 25 to 35 grams of fiber each day. Talk to your dietitian or healthcare provider about the amount of fiber you need.   DISCHARGE INSTRUCTIONS:   Good sources of fiber:       Foods with at least 4 grams of fiber per serving:      ? to ½ cup of high-fiber cereal (check the nutrition label on the box)    ½ cup of blackberries or raspberries    4 dried prunes    1 cooked artichoke    ½ cup of cooked legumes, such as lentils, or red, kidney, and bar beans    Foods with 1 to 3 grams of fiber per servin slice of whole-wheat, pumpernickel, or rye bread    ½ cup of cooked brown rice    4 whole-wheat crackers    1 cup of oatmeal    ½ cup of cereal with 1 to 3 grams of fiber per serving (check the nutrition label on the box)    1 small piece of fruit, such as an apple, banana, pear, kiwi, or orange    3 dates    ½ cup of canned apricots, fruit cocktail, peaches, or pears    ½ cup of raw or cooked vegetables, such as carrots, cauliflower, cabbage, spinach, squash, or corn  Ways that you can increase fiber in your diet:   Choose brown or wild rice instead of white rice. Use whole wheat flour in recipes instead of white or all-purpose flour. Add beans and peas to casseroles or soups. Choose fresh fruit and vegetables with peels or skins on instead of juices. Other diet guidelines to follow: Add fiber to your diet slowly. You may have abdominal discomfort, bloating, and gas if you add fiber to your diet too quickly. Drink plenty of liquids as you add fiber to your diet. You may have nausea or develop constipation if you do not drink enough water. Ask how much liquid to drink each day and which liquids are best for you. © Copyright Natasha Phelan 2023 Information is for End User's use only and may not be sold, redistributed or otherwise used for commercial purposes. The above information is an  only. It is not intended as medical advice for individual conditions or treatments. Talk to your doctor, nurse or pharmacist before following any medical regimen to see if it is safe and effective for you.

## 2023-11-01 NOTE — TELEPHONE ENCOUNTER
Scheduled date of EGD/colonoscopy (as of today):12/12/23  Physician performing EGD/colonoscopy:Dr Kraus    Location of EGD/colonoscopy:  Desired bowel prep reviewed with patient:gillian   Instructions reviewed with patient by:sb  Clearances:  none

## 2023-11-08 ENCOUNTER — OFFICE VISIT (OUTPATIENT)
Dept: BARIATRICS | Facility: CLINIC | Age: 68
End: 2023-11-08
Payer: COMMERCIAL

## 2023-11-08 VITALS
HEART RATE: 89 BPM | SYSTOLIC BLOOD PRESSURE: 124 MMHG | DIASTOLIC BLOOD PRESSURE: 70 MMHG | HEIGHT: 59 IN | BODY MASS INDEX: 27.66 KG/M2 | WEIGHT: 137.2 LBS

## 2023-11-08 DIAGNOSIS — E66.3 OVERWEIGHT WITH BODY MASS INDEX (BMI) OF 28 TO 28.9 IN ADULT: Primary | ICD-10-CM

## 2023-11-08 PROCEDURE — 99215 OFFICE O/P EST HI 40 MIN: CPT | Performed by: NURSE PRACTITIONER

## 2023-11-08 NOTE — ASSESSMENT & PLAN NOTE
- Discussed options of [facility]ensive Lifestyle Intervention Program and Conservative Program and the role of weight loss medications. - Explained the importance of making lifestyle changes first before starting anti-obesity medications. - Patient should demonstrate lifestyle changes first before anti-obesity medication initiated. - Patient is interested in pursuing Conservative Program and follow up visits with medical weight management provider. - Initial weight loss goal of 5-10% weight loss for improved health  - Weight loss can improve patient's co-morbid conditions and/or prevent weight-related complications. - Labs reviewed from 10/2023  -Patient lifestyle habits were reviewed and barriers to weight loss were discussed. Patient was given a meal plan with a calorie goal of 8267-4740 for weight loss. She was encouraged to start eating more regularly throughout the day and incorporating protein with her first meal of the day. Patient was encouraged to incorporate small snacks within her meals so that she is not as hungry when the mealtimes occur. She was given a healthy snack list including low calorie and low carb snack ideas with portions. Patient will start to adopt some of these nutrition changes over the next couple of months and see if this helps with some weight loss. If she is not successful on her own she may consider a visit with the dietitian. Patient was also encouraged to make sure she is getting some activity daily even if it is a 10-minute walk. She will start to incorporate more regular activity to help with muscle tone. Goals:  Calorie Goal: 2262-6661 calories  Do not skip meals. Food log via dedra - options include  www. myfitnesspal.com, sparkpeople. com, [employer].com, Manicube. com, baritastic  No sugary beverages. At least 64oz of water daily. Increase physical activity by 10 minutes daily.  Gradually increase physical activity to a goal of 5 days per week for 30 minutes of MODERATE intensity PLUS 2 days per week of FULL BODY resistance training

## 2023-11-08 NOTE — PROGRESS NOTES
Assessment/Plan:    Overweight with body mass index (BMI) of 28 to 28.9 in adult  - Discussed options of [facility]-Intensive Lifestyle Intervention Program and Conservative Program and the role of weight loss medications. - Explained the importance of making lifestyle changes first before starting anti-obesity medications. - Patient should demonstrate lifestyle changes first before anti-obesity medication initiated. - Patient is interested in pursuing Conservative Program and follow up visits with medical weight management provider. - Initial weight loss goal of 5-10% weight loss for improved health  - Weight loss can improve patient's co-morbid conditions and/or prevent weight-related complications. - Labs reviewed from 10/2023  -Patient lifestyle habits were reviewed and barriers to weight loss were discussed. Patient was given a meal plan with a calorie goal of [id number] for weight loss. She was encouraged to start eating more regularly throughout the day and incorporating protein with her first meal of the day. Patient was encouraged to incorporate small snacks within her meals so that she is not as hungry when the mealtimes occur. She was given a healthy snack list including low calorie and low carb snack ideas with portions. Patient will start to adopt some of these nutrition changes over the next couple of months and see if this helps with some weight loss. If she is not successful on her own she may consider a visit with the dietitian. Patient was also encouraged to make sure she is getting some activity daily even if it is a 10-minute walk. She will start to incorporate more regular activity to help with muscle tone. Goals:  Calorie Goal: 6279-3191 calories  Do not skip meals. Food log via dedra - options include  www. myfitnesspal.com, sparkpeople. com, Twitchit.com, calorieking. com, baritastic  No sugary beverages. At least 64oz of water daily.   Increase physical activity by 10 minutes daily. Gradually increase physical activity to a goal of 5 days per week for 30 minutes of MODERATE intensity PLUS 2 days per week of FULL BODY resistance training          Ragini was seen today for follow-up. Diagnoses and all orders for this visit:    Overweight with body mass index (BMI) of 28 to 28.9 in adult           Total time spent reviewing chart, interviewing patient, examining patient, discussing plan, answering all questions, and documentin min, with >50% face-to-face time spent counseling patient on nonsurgical interventions for the treatment of excess weight. Discussed in detail nonsurgical options including intensive lifestyle intervention program, very low-calorie diet program and conservative program.  Discussed the role of weight loss medications. Counseled patient on diet behavior and exercise modification for weight loss. Follow up in approximately 2 months with Non-Surgical Physician/Advanced Practitioner. Subjective:   Chief Complaint   Patient presents with    Follow-up     Pt is here for MWM f/u. Patient ID: Amandeep Steen  is a 79 y.o. female with excess weight/obesity here to pursue weight management. Previous notes and records have been reviewed.     Past Medical History:   Diagnosis Date    Anemia     Anxiety     Cancer ([address])     ENDOMETRIAL    Chemotherapy induced neutropenia  2019    CKD (chronic kidney disease) stage 3, GFR 30-59 ml/min ([address])     COVID     Fall     Depression     DVT (deep venous thrombosis) ([address]) 2019    RIGHT LEG    Endometrial cancer ([address]) 2017    GERD (gastroesophageal reflux disease)     H/O gastric bypass     Hard to intubate     pt denies AS OF 23    History of chemotherapy     started ndometrial cancer- done 24    History of DVT in adulthood     RLE    History of transfusion 2023    Hyperglycemia     vx type 2 dm -- last assessed 14; resolved 17    Hyperlipidemia     Hypertension in past- no meds at present    Iron deficiency anemia     iron infusions weekly    OAB (overactive bladder)     Port-A-Cath in place     Wears glasses      Past Surgical History:   Procedure Laterality Date    ABDOMINAL SURGERY      GASTRIC BYPASS; 2001    BREAST BIOPSY Right 06/28/2019    core biopsy; benign    CHOLECYSTECTOMY      at the time of gastric bypass    COLONOSCOPY      CT NEEDLE BIOPSY LYMPH NODE  07/08/2019    FL GUIDED CENTRAL VENOUS ACCESS DEVICE INSERTION  11/12/2019    FL RETROGRADE PYELOGRAM  03/30/2023    FL RETROGRADE PYELOGRAM  05/09/2023    FL RETROGRADE PYELOGRAM  8/1/2023    GASTRIC BYPASS      HYSTERECTOMY Bilateral 2017    total abdominal with salpingo-oophorectomy    IR NEPHROSTOMY TO NEPHROURETERAL STENT  06/09/2022    IR NEPHROSTOMY TO NEPHROURETERAL STENT  12/20/2022    IR NEPHROSTOMY TO NEPHROURETERAL STENT  8/8/2023    IR NEPHROSTOMY TUBE CHECK/CHANGE/REPOSITION/REINSERTION/UPSIZE  05/27/2022    IR NEPHROSTOMY TUBE PLACEMENT  07/26/2019    IR NEPHROSTOMY TUBE PLACEMENT  05/27/2023    IR NEPHROURETERAL STENT CHECK/CHANGE/REPOSITION  04/06/2021    IR NEPHROURETERAL STENT CHECK/CHANGE/REPOSITION  06/04/2021    IR NEPHROURETERAL STENT CHECK/CHANGE/REPOSITION  09/03/2021    IR NEPHROURETERAL STENT CHECK/CHANGE/REPOSITION  12/07/2021    IR NEPHROURETERAL STENT CHECK/CHANGE/REPOSITION  03/08/2022    IR NEPHROURETERAL STENT CHECK/CHANGE/REPOSITION  05/10/2022    IR NEPHROURETERAL STENT CHECK/CHANGE/REPOSITION  09/19/2022    IR PICC LINE  09/27/2019    IR PORT PLACEMENT  07/26/2019    IR PORT PLACEMENT      IR PORT REMOVAL  09/20/2019    OOPHORECTOMY Bilateral 2017    MN CYSTO BLADDER W/URETERAL CATHETERIZATION Right 03/30/2023    Procedure: CYSTOSCOPY RETROGRADE PYELOGRAM WITH exchange of STENT URETERAL;  Surgeon: Mildred Rosales MD;  [facility];  Service: Urology    MN CYSTO BLADDER W/URETERAL CATHETERIZATION Bilateral 05/09/2023    Procedure: Kike Retana RETROGRADE PYELOGRAM, WITH LEFT INSERTION STENT URETERAL, RIGHT URTERAL STENT EXCHANGE;  Surgeon: Pooja Martinez MD;  [facility];  Service: Urology    OH CYSTO BLADDER W/URETERAL CATHETERIZATION Bilateral 8/1/2023    Procedure: CYSTOSCOPY BILATERAL RETROGRADE  WITH REMOVAL BILATERAL  STENT URETERAL;  Surgeon: Brandon De Santiago MD;  [facility];  Service: Urology    OH CYSTO W/INSERT URETERAL STENT Right 03/30/2023    Procedure: EXCHANGE STENT URETERAL;  Surgeon: Brandon De Santiago MD;  [facility];  Service: Urology    OH INSJ TUNNELED CTR VAD W/SUBQ PORT AGE 7 YR/> Left 11/12/2019    Procedure: INSERTION VENOUS PORT ( PORT-A-CATH) IR;  Surgeon: Arsh Kang DO;  [facility];  Service: Interventional Radiology    OH LAPS ABD PRTM&OMENTUM DX W/WO SPEC BR/WA SPX N/A 12/19/2017    Procedure: LAPAROSCOPY DIAGNOSTIC;  Surgeon: Cony Kathleen MD;  [facility];  Service: Gynecology Oncology    OH LAPS Hermes Hernandes HYST W/BILAT QUARTERMAIN RMVL TUBE/OVARY N/A 12/19/2017    Procedure: HYSTERECTOMY LAPAROSCOPIC TOTAL ([facility]) W/ ROBOTICS; BILATERAL SALPINGOOPHERECTOMY; LYMPH NODE DISSECTION; lysis of adhesions;  Surgeon: Cony Kathleen MD;  [facility];  Service: Gynecology Oncology    REMOVAL URETERAL STENT Bilateral 8/1/2023    Procedure: REMOVAL STENT URETERAL;  Surgeon: Brandon De Santiago MD;  [facility];  Service: Urology    TONSILLECTOMY      US GUIDED BREAST BIOPSY RIGHT COMPLETE Right 06/28/2019       HPI:  Wt Readings from Last 20 Encounters:   11/08/23 62.2 kg (137 lb 3.2 oz)   11/01/23 64.4 kg (142 lb)   10/31/23 64.4 kg (142 lb)   09/26/23 61.7 kg (136 lb)   09/14/23 61.2 kg (135 lb)   08/30/23 61.2 kg (135 lb)   08/22/23 61.2 kg (135 lb)   08/08/23 61.7 kg (136 lb)   08/07/23 61.9 kg (136 lb 8 oz)   08/01/23 56.7 kg (125 lb)   07/11/23 54.6 kg (120 lb 6.4 oz)   06/16/23 50.8 kg (112 lb)   06/14/23 52.2 kg (115 lb)   06/14/23 52.2 kg (115 lb)   06/07/23 51.9 kg (114 lb 6.4 oz)   06/02/23 52.2 kg (115 lb)   05/28/23 54 kg (119 lb)   05/17/23 50.3 kg (111 lb)   05/16/23 49 kg (108 lb)   04/28/23 52.6 kg (116 lb)       Patient presents today to medical weight management office for consult. s/p Edgardo-En-Y Gastric Bypass with Dr. Joan Galloway on in 2001  Patient has recovered from endometrial cancer and during her therapy has lost almost 100 pounds. Her lowest weight was 109 pounds and she has gained back almost 30 pounds. Patient would like to maintain her weight between 130 and 135 pounds. She would like to get support and guidance as to how to better eat and better balance her nutrition. Patient would not like any medication at this time. Patient has chronic kidney disease and will likely be starting dialysis in the next couple of years. Patient states most of her struggles come in the evening where she finds herself very hungry and snacking often. Patient wakes up multiple times at night to empty her nephrostomy bags and sometimes will go for a snack in the middle of the night. She is working with trying to overcome these behaviors and have healthier snacks on hand. Patient states she has been through many weight loss programs and knows some of the habits she should get back into her regular routine, but needs some help implementing these.     Obesity/Excess Weight:  Severity: Mild  Onset:  on and off her whole life    Modifiers: Diet and Exercise, Physician Supervised Weight Loss Program, and bariatric surgery  Contributing factors: Poor Food Choices, Insufficient Physical Activity, and Medications  Associated symptoms: comorbid conditions, fatigue, increased joint pain, decreased exercise capacity, and decreased mobility    Diet recall:  B: Spanish toast sticks  S: no  L: sandwich -  thin bread cold cuts and baby carrots  S: SF pudding   D: grazing OR take out   S: pretzels OR grapes OR carrots OR tostitos with salsa  Eating in the middle of the night    Hydration: iced tea (2 glasses), water - 4 bottles  Alcohol: no  Smoking: no  Exercise: PT for legs - has exercises at home and likes to walk  Occupation: retired  Sleep: interrupted due to nephrostomy bags      Current weight: 137 lbs  Current BMI: 28.19  Goal weight: 130-135 lbs      The following portions of the patient's history were reviewed and updated as appropriate: allergies, current medications, past family history, past medical history, past social history, past surgical history, and problem list.    Family History   Problem Relation Age of Onset    Hyperlipidemia Mother     Heart disease Mother     Ovarian cancer Mother 48    Colon cancer Mother     Lymphoma Father     Breast cancer Sister 61    No Known Problems Brother     No Known Problems Brother     No Known Problems Maternal Grandmother     Bone cancer Maternal Grandfather     Uterine cancer Paternal Grandmother     No Known Problems Paternal Grandfather     No Known Problems Maternal Aunt     No Known Problems Maternal Aunt     No Known Problems Maternal Aunt     No Known Problems Maternal Aunt     No Known Problems Paternal Aunt     No Known Problems Paternal Aunt     No Known Problems Paternal Aunt     No Known Problems Paternal Aunt         Review of Systems   Constitutional:  Negative for fatigue. HENT:  Negative for sore throat. Respiratory:  Negative for cough and shortness of breath. Cardiovascular:  Negative for chest pain, palpitations and leg swelling. Gastrointestinal:  Negative for abdominal pain, constipation, diarrhea and nausea. Genitourinary:  Negative for dysuria. Musculoskeletal:  Positive for arthralgias and back pain. Skin:  Negative for rash. Neurological:  Negative for headaches. Psychiatric/Behavioral:  Negative for dysphoric mood. The patient is not nervous/anxious.         Objective:  /70   Pulse 89   Ht 4' 10.5" (1.486 m)   Wt 62.2 kg (137 lb 3.2 oz)   LMP  (LMP Unknown)   BMI 28.19 kg/m²     Physical Exam  Vitals and nursing note reviewed. Constitutional:       Appearance: Normal appearance. She is obese. HENT:      Head: Normocephalic. Pulmonary:      Effort: Pulmonary effort is normal.   Neurological:      General: No focal deficit present. Mental Status: She is alert and oriented to person, place, and time. Psychiatric:         Mood and Affect: Mood normal.         Behavior: Behavior normal.         Thought Content: Thought content normal.         Judgment: Judgment normal.                Labs and Imaging  Recent labs and imaging have been personally reviewed.   Lab Results   Component Value Date    WBC 13.64 (H) 10/23/2023    HGB 9.3 (L) 10/23/2023    HCT 30.7 (L) 10/23/2023    MCV 94 10/23/2023     10/23/2023     Lab Results   Component Value Date     10/27/2017    SODIUM 138 10/13/2023    K 4.2 10/13/2023     10/13/2023    CO2 21 10/13/2023    AGAP 10 10/13/2023    BUN 51 (H) 10/13/2023    CREATININE 2.48 (H) 10/13/2023    GLUC 90 08/15/2023    GLUF 93 10/13/2023    CALCIUM 9.1 10/13/2023    AST 15 08/14/2023    ALT 14 08/14/2023    ALKPHOS 133 (H) 08/14/2023    PROT 6.9 10/27/2017    TP 6.8 08/25/2023    BILITOT 0.3 10/27/2017    TBILI 0.28 08/14/2023    EGFR 19 10/13/2023     Lab Results   Component Value Date    HGBA1C 5.2 10/31/2022     Lab Results   Component Value Date    ZYU6EILBFALK 2.052 08/14/2023     Lab Results   Component Value Date    CHOLESTEROL 184 01/28/2023     Lab Results   Component Value Date    HDL 64 01/28/2023     Lab Results   Component Value Date    TRIG 82 01/28/2023     Lab Results   Component Value Date    LDLCALC 104 (H) 01/28/2023

## 2023-11-10 ENCOUNTER — HOSPITAL ENCOUNTER (OUTPATIENT)
Dept: RADIOLOGY | Facility: HOSPITAL | Age: 68
Discharge: HOME | End: 2023-11-10
Attending: RADIOLOGY
Payer: COMMERCIAL

## 2023-11-10 VITALS
SYSTOLIC BLOOD PRESSURE: 107 MMHG | DIASTOLIC BLOOD PRESSURE: 57 MMHG | WEIGHT: 137.13 LBS | BODY MASS INDEX: 27.64 KG/M2 | HEIGHT: 59 IN | TEMPERATURE: 97.4 F | RESPIRATION RATE: 18 BRPM | HEART RATE: 70 BPM | OXYGEN SATURATION: 98 %

## 2023-11-10 DIAGNOSIS — N13.5 URETERAL STRICTURE: Primary | ICD-10-CM

## 2023-11-10 DIAGNOSIS — N13.5 BILATERAL URETERAL OBSTRUCTION: ICD-10-CM

## 2023-11-10 PROCEDURE — 50435 EXCHANGE NEPHROSTOMY CATH: CPT | Performed by: STUDENT IN AN ORGANIZED HEALTH CARE EDUCATION/TRAINING PROGRAM

## 2023-11-10 PROCEDURE — 99152 MOD SED SAME PHYS/QHP 5/>YRS: CPT | Performed by: STUDENT IN AN ORGANIZED HEALTH CARE EDUCATION/TRAINING PROGRAM

## 2023-11-10 PROCEDURE — C1769 GUIDE WIRE: HCPCS

## 2023-11-10 PROCEDURE — 50435 EXCHANGE NEPHROSTOMY CATH: CPT

## 2023-11-10 PROCEDURE — C1729 CATH, DRAINAGE: HCPCS

## 2023-11-10 RX ORDER — FENTANYL CITRATE 50 UG/ML
INJECTION, SOLUTION INTRAMUSCULAR; INTRAVENOUS AS NEEDED
Status: COMPLETED | OUTPATIENT
Start: 2023-11-10 | End: 2023-11-10

## 2023-11-10 RX ORDER — LIDOCAINE WITH 8.4% SOD BICARB 0.9%(10ML)
SYRINGE (ML) INJECTION AS NEEDED
Status: COMPLETED | OUTPATIENT
Start: 2023-11-10 | End: 2023-11-10

## 2023-11-10 RX ORDER — SODIUM CHLORIDE 9 MG/ML
10 INJECTION INTRAVENOUS DAILY
Qty: 300 ML | Refills: 3 | Status: SHIPPED | OUTPATIENT
Start: 2023-11-10 | End: 2024-03-09

## 2023-11-10 RX ORDER — MIDAZOLAM HYDROCHLORIDE 2 MG/2ML
INJECTION, SOLUTION INTRAMUSCULAR; INTRAVENOUS AS NEEDED
Status: COMPLETED | OUTPATIENT
Start: 2023-11-10 | End: 2023-11-10

## 2023-11-10 RX ADMIN — MIDAZOLAM 0.5 MG: 1 INJECTION INTRAMUSCULAR; INTRAVENOUS at 11:57

## 2023-11-10 RX ADMIN — FENTANYL CITRATE 25 MCG: 50 INJECTION INTRAMUSCULAR; INTRAVENOUS at 11:57

## 2023-11-10 RX ADMIN — MIDAZOLAM 1 MG: 1 INJECTION INTRAMUSCULAR; INTRAVENOUS at 11:52

## 2023-11-10 RX ADMIN — MIDAZOLAM 0.5 MG: 1 INJECTION INTRAMUSCULAR; INTRAVENOUS at 11:47

## 2023-11-10 RX ADMIN — FENTANYL CITRATE 50 MCG: 50 INJECTION INTRAMUSCULAR; INTRAVENOUS at 11:52

## 2023-11-10 RX ADMIN — Medication 4 ML: at 11:50

## 2023-11-10 RX ADMIN — FENTANYL CITRATE 25 MCG: 50 INJECTION INTRAMUSCULAR; INTRAVENOUS at 11:47

## 2023-11-10 RX ADMIN — IOHEXOL 10 ML: 350 INJECTION, SOLUTION INTRAVENOUS at 12:20

## 2023-11-10 NOTE — BRIEF OP NOTE (RAD/CATH)
INTERVENTIONAL RADIOLOGY PROCEDURE NOTE    Date: 11/10/2023    Procedure:   Procedure Summary       Date: 11/10/23 Room / Location: [address] [place] [address] Interventional Radiology    Anesthesia Start:  Anesthesia Stop:     Procedure: IR NEPHROSTOMY TUBE CHECK/CHANGE/REPOSITION/REINSERTION/UPSIZE Diagnosis:       Bilateral ureteral obstruction      (Routine exchange)    Scheduled Providers:  Responsible Provider:     Anesthesia Type: Not recorded ASA Status: Not recorded          Preoperative diagnosis:   1. Bilateral ureteral obstruction        Postoperative diagnosis: Same. Surgeon: Xena Ramos MD     Assistant: None. No qualified resident was available. Blood loss: Minimal    Specimens: None     Findings:   Uncomplicated exchange of bilateral 10.2 Fr x 25 cm Hicks-Perry nephrostomy catheters. Complications: None immediate.     Anesthesia: conscious sedation

## 2023-11-10 NOTE — PERIOPERATIVE NURSING NOTE
Per Monica [place]. With IR- Pt. Only needs to stay 1 hour post procedure and may be discharged. Pt. Is A&oX4. Offers no complaints of pain. VSS. Was able to tolerate PO fluids without any issues. Exchange sites are clean, dry, and intact.

## 2023-11-10 NOTE — PERIOPERATIVE NURSING NOTE
While removing left AC IV site, noted left upper arm is swollen without redness, pain, or warmth. IV line has been clamped since arrival from IR. IV was removed and IR nurse Orquidea Gracia. Was notified. D/C paper work reviewed and provided to patient and sister at bedside.

## 2023-11-10 NOTE — DISCHARGE INSTRUCTIONS
Procedural Sedation   WHAT YOU NEED TO KNOW:   Procedural sedation is medicine used during procedures to help you feel relaxed and calm. You will remember little to none of the procedure. After sedation you may feel tired, weak, or unsteady on your feet. You may also have trouble concentrating or short-term memory loss. These symptoms should go away in 24 hours or less. DISCHARGE INSTRUCTIONS:     Call 911 or have someone else call for any of the following: You have sudden trouble breathing. You cannot be woken. Contact Interventional Radiology at [phone]   Angelique PATIENTS: Contact Interventional Radiology at [phone]) Johan Mata PATIENTS: Contact Interventional Radiology at [phone]) if any of the following occur: You have a severe headache or dizziness. Your heart is beating faster than usual.    You have a fever or chills. Your skin is itchy, swollen, or you have a rash. You have nausea or are vomiting for more than 8 hours after the procedure. You have questions or concerns about your condition or care. Self-care:   Have someone stay with you for 24 hours. This person can drive you to errands and help you do things around the house. This person can also watch for problems. Rest and do quiet activities for 24 hours. Do not exercise, ride a bike, or play sports. Stand up slowly to prevent dizziness and falls. Take short walks around the house with another person. Slowly return to your usual activities the next day. Do not drive or use dangerous machines or tools for 24 hours. You may injure yourself or others. Examples include a lawnmower, saw, or drill. Do not return to work for 24 hours if you use dangerous machines or tools for work. Do not make important decisions for 24 hours. For example, do not sign important papers or invest money. Drink liquids as directed. Liquids help flush the sedation medicine out of your body.  Ask how much liquid to drink each day and which liquids are best for you. Eat small, frequent meals to prevent nausea and vomiting. Start with clear liquids such as juice or broth. If you do not vomit after clear liquids, you can eat your usual foods. Do not drink alcohol or take medicines that make you drowsy. This includes medicines that help you sleep and anxiety medicines. Ask your healthcare provider if it is safe for you to take pain medicine. Follow up with your healthcare provider as directed: Write down your questions so you remember to ask them during your visits. Nephrostomy Tube Care     WHAT YOU NEED TO KNOW:   A nephrostomy tube is a catheter (thin plastic tube) that is inserted through your skin and into your kidney. The nephrostomy tube drains urine from your kidney into a collecting bag outside your body. You may need a nephrostomy tube when something is blocking the normal flow of urine. A nephrostomy tube may be used for a short or a long period of time. The nephrostomy tube comes out of your back, so you will need someone to help care for your nephrostomy tube. DISCHARGE INSTRUCTIONS:      How to clean the skin around the nephrostomy tube and change the bandage:  Since the nephrostomy tube comes out of your back, you will not be able to care for it by yourself. Ask someone to follow the general directions below to check and care for your nephrostomy tube. Gather the items you will need. Disposable (single use) under-pad, and a clean washcloth  Plain soap, warm water, and new medical gloves  Sterile gauze bandages  Clear adhesive dressing or medical tape  Skin barrier  Protective skin film  Trash bag  Remove the old bandage, and check the tube entry site. Have the patient lie on his side with the nephrostomy tube entry site facing up. Place the under-pad where it will catch drainage as you are working with the nephrostomy tube. Wash your hands with soap and water.  Put on new medical gloves. Gently remove the old bandage, without pulling on the tube. Do this by holding the skin beside the tube with one hand. With the other hand, gently remove sticky tape and the skin barrier by pulling in the same direction as hair growth. Do not touch the side of the bandage that is placed over or around the tube. Throw the bandage and skin barrier away in a trash bag. Look for signs of infection, such as skin redness and swelling. Report any skin changes to healthcare providers. Clean the tube entry site. Hold the tube in place to keep it from being pulled out while you are cleaning around it. You will need to clean the area twice. For the first cleaning, wet a new gauze bandage with soap and water. Begin at the entry site of the tube. Wipe the skin in circles, moving away from the entry site. Remove blood and any other material with the gauze. Do this as often as needed. Use a new gauze bandage each time you clean the area, moving away from the entry site. For the second cleaning, wet a new gauze bandage with water. Begin at the entry site of the tube. Wipe the skin in circles, moving away from the entry site. Use a new gauze bandage each time you clean the area, moving away from the entry site. Gently pat the skin with a clean washcloth to dry it. Apply the skin barrier and bandages. Roll up a bandage to make it thick, and place it under  the place where the tube enters the skin. Place it to support the tube, and stop it from kinking or bending. Tape the bandage in place, and apply more bandages if directed by a healthcare provider. Bring the tubing forward to the front and tape it to the skin. Do not stretch the tube tight, because this may pull the nephrostomy tube out. How often to change the bandage. Change the bandage around the tube, every other day. If your bandages  get dirty or wet, change them right away, and as often as needed.  If your nephrostomy tube is to be used for a long period of time, the tube needs to be changed every 2 to 3 months. Healthcare providers will tell you when you need to make an appointment to have your tube changed. How to care for the urine drainage bag:   Ask if you need to measure and write down how much urine is in the bag before you empty it. Drain urine out of the drainage bag when it is ½ to ? full. Open the spout at the bottom of the bag to empty the urine into the toilet. You may need to detach the drainage bag from the nephrostomy tube to change it. . If so, attach a new drainage bag tightly to the nephrostomy tube. How to prevent problems with your nephrostomy tube:   Change bandages, directed. This helps to prevent infection. Throw away or clean your drainage bag as directed by your healthcare provider. Wipe the connecting ends of the drainage bag with alcohol before you reconnect the bag to the tube. This helps prevent infection. Keep the tube taped to your skin and connected to a drainage bag placed below the level of your kidneys. This helps prevent urine from backing up into your kidneys. You may wear a small drainage bag strapped to your leg to let you move around more easily. Check the catheter to be sure it is in place after you change your clothes or do other activities. Do not wear tight clothing over the tube. Place the tubing over your thigh rather than under it when you are sitting down. Be sure that nothing is pulling on the nephrostomy tube when you move around. Change positions if you see little or no urine in your drainage bag. Check to see if the urine tube is twisted or bent. Be sure that you are not sitting or lying on the tube. If there are no kinks and there is little or no urine in the drainage bag, tell your healthcare provider. Flush out the tube as directed. Some tubes get flushed one time a day with 10 mls of NSS You will be given a prescription for the flushes.   To flush the nephrostomy tube, clean both connections with alcohol swap. Twist off the drainage bag tube and twist the saline syringe into the nephrostomy tube and flush briskly. Remove the syringe and twist the drainage bag tube back into the nephrostomy tube. Keep the site covered while you shower. Tape a piece of clear adhesive plastic over the dressing to keep it dry while you shower. Do not take tub baths. Contact Interventional Radiology at [phone] Angelique PATIENTS: Contact Interventional Radiology at [phone]) Casandra Hilliard PATIENTS: Contact Interventional Radiology at [phone]) if:  The skin around the nephrostomy tube is red, swollen, itches, or has a rash. You have a fever greater than 101 or chills. You have lower back or hip pain. There are changes in how your urine looks or smells. You have little or no urine draining from the nephrostomy tube. You have nausea and are vomiting. The black bernardo on your tube has moved, or the tube is longer than when it was put in. You have questions or concerns about your condition or care. The nephrostomy tube comes out completely. There is blood, pus, or a bad smell coming from the place where the tube enters your skin. Urine is leaking around the tube. The following pharmacies carry the flush syringes. [facility]                     [facility]                    [address] [place] [address]  Phone [phone]            Phone [phone] [facility].  Ananth                        [facility]                      [place] [phone]  Phone [phone]            Phone [phone]    [facility] [place] [address] [phone]  [facility].   [facility]   Phone [phone]

## 2023-11-16 ENCOUNTER — LAB (OUTPATIENT)
Dept: LAB | Facility: CLINIC | Age: 68
End: 2023-11-16
Payer: COMMERCIAL

## 2023-11-16 DIAGNOSIS — N18.4 STAGE 4 CHRONIC KIDNEY DISEASE (HCC): ICD-10-CM

## 2023-11-16 DIAGNOSIS — Z86.39 HISTORY OF METABOLIC ACIDOSIS: ICD-10-CM

## 2023-11-16 DIAGNOSIS — N25.81 SECONDARY HYPERPARATHYROIDISM (HCC): ICD-10-CM

## 2023-11-16 DIAGNOSIS — R80.9 NON-NEPHROTIC RANGE PROTEINURIA: ICD-10-CM

## 2023-11-16 DIAGNOSIS — R77.8 ABNORMAL SPEP: ICD-10-CM

## 2023-11-16 LAB
ALBUMIN SERPL BCP-MCNC: 3.7 G/DL (ref 3.5–5)
ANION GAP SERPL CALCULATED.3IONS-SCNC: 12 MMOL/L
BASOPHILS # BLD AUTO: 0.04 THOUSANDS/ÂΜL (ref 0–0.1)
BASOPHILS NFR BLD AUTO: 1 % (ref 0–1)
BUN SERPL-MCNC: 66 MG/DL (ref 5–25)
CALCIUM SERPL-MCNC: 8.6 MG/DL (ref 8.4–10.2)
CHLORIDE SERPL-SCNC: 102 MMOL/L (ref 96–108)
CO2 SERPL-SCNC: 21 MMOL/L (ref 21–32)
CREAT SERPL-MCNC: 2.23 MG/DL (ref 0.6–1.3)
CREAT UR-MCNC: 19.3 MG/DL
CREAT UR-MCNC: 19.3 MG/DL
EOSINOPHIL # BLD AUTO: 0.03 THOUSAND/ÂΜL (ref 0–0.61)
EOSINOPHIL NFR BLD AUTO: 0 % (ref 0–6)
ERYTHROCYTE [DISTWIDTH] IN BLOOD BY AUTOMATED COUNT: 13.2 % (ref 11.6–15.1)
GFR SERPL CREATININE-BSD FRML MDRD: 22 ML/MIN/1.73SQ M
GLUCOSE P FAST SERPL-MCNC: 125 MG/DL (ref 65–99)
HCT VFR BLD AUTO: 33.2 % (ref 34.8–46.1)
HGB BLD-MCNC: 9.8 G/DL (ref 11.5–15.4)
IMM GRANULOCYTES # BLD AUTO: 0.02 THOUSAND/UL (ref 0–0.2)
IMM GRANULOCYTES NFR BLD AUTO: 0 % (ref 0–2)
LYMPHOCYTES # BLD AUTO: 0.75 THOUSANDS/ÂΜL (ref 0.6–4.47)
LYMPHOCYTES NFR BLD AUTO: 9 % (ref 14–44)
MCH RBC QN AUTO: 28 PG (ref 26.8–34.3)
MCHC RBC AUTO-ENTMCNC: 29.5 G/DL (ref 31.4–37.4)
MCV RBC AUTO: 95 FL (ref 82–98)
MICROALBUMIN UR-MCNC: 47.7 MG/L
MICROALBUMIN/CREAT 24H UR: 247 MG/G CREATININE (ref 0–30)
MONOCYTES # BLD AUTO: 0.63 THOUSAND/ÂΜL (ref 0.17–1.22)
MONOCYTES NFR BLD AUTO: 8 % (ref 4–12)
NEUTROPHILS # BLD AUTO: 6.64 THOUSANDS/ÂΜL (ref 1.85–7.62)
NEUTS SEG NFR BLD AUTO: 82 % (ref 43–75)
NRBC BLD AUTO-RTO: 0 /100 WBCS
PHOSPHATE SERPL-MCNC: 5.5 MG/DL (ref 2.3–4.1)
PLATELET # BLD AUTO: 298 THOUSANDS/UL (ref 149–390)
PMV BLD AUTO: 12.2 FL (ref 8.9–12.7)
POTASSIUM SERPL-SCNC: 4.2 MMOL/L (ref 3.5–5.3)
PROT UR-MCNC: 37 MG/DL
PROT/CREAT UR: 1.92 MG/G{CREAT} (ref 0–0.1)
PTH-INTACT SERPL-MCNC: 401.2 PG/ML (ref 12–88)
RBC # BLD AUTO: 3.5 MILLION/UL (ref 3.81–5.12)
SODIUM SERPL-SCNC: 135 MMOL/L (ref 135–147)
WBC # BLD AUTO: 8.11 THOUSAND/UL (ref 4.31–10.16)

## 2023-11-16 PROCEDURE — 82570 ASSAY OF URINE CREATININE: CPT

## 2023-11-16 PROCEDURE — 83970 ASSAY OF PARATHORMONE: CPT

## 2023-11-16 PROCEDURE — 36415 COLL VENOUS BLD VENIPUNCTURE: CPT

## 2023-11-16 PROCEDURE — 80069 RENAL FUNCTION PANEL: CPT

## 2023-11-16 PROCEDURE — 85025 COMPLETE CBC W/AUTO DIFF WBC: CPT

## 2023-11-16 PROCEDURE — 82043 UR ALBUMIN QUANTITATIVE: CPT

## 2023-11-16 PROCEDURE — 84156 ASSAY OF PROTEIN URINE: CPT

## 2023-11-17 ENCOUNTER — TELEPHONE (OUTPATIENT)
Dept: NEPHROLOGY | Facility: CLINIC | Age: 68
End: 2023-11-17

## 2023-11-17 NOTE — TELEPHONE ENCOUNTER
----- Message from Leesa Freeman MD sent at 11/17/2023  1:25 PM EST -----  Regarding: Education   Can you please educate Ragini on a low phosphorus diet. Her phosphorus is elevated and I would like her to focus on a low phosphate diet. Thank you.

## 2023-11-20 ENCOUNTER — TELEPHONE (OUTPATIENT)
Dept: NEPHROLOGY | Facility: CLINIC | Age: 68
End: 2023-11-20

## 2023-11-20 DIAGNOSIS — E83.42 HYPOMAGNESEMIA: Primary | ICD-10-CM

## 2023-11-20 DIAGNOSIS — I10 BENIGN ESSENTIAL HYPERTENSION: ICD-10-CM

## 2023-11-20 NOTE — TELEPHONE ENCOUNTER
----- Message from Marly Henning MD sent at 11/20/2023  9:48 AM EST -----  Please advise the patient to check renal function panel with 25-hydroxy vitamin D level in 1 month. Thank you.

## 2023-11-20 NOTE — TELEPHONE ENCOUNTER
Follow-up CKD. Serum creatinine on most recent blood work around 2.2. Proteinuria has significantly improved from last year which was thought to be due to use of Avastin. Avastin remains on hold. She has bilateral nephrostomy tubes to drainage. PTH above 400 and in setting of hyperphosphatemia and secondary hyperparathyroidism of CKD. We will start with low phosphate diet. We will check 25-hydroxy vitamin D level to rule out vitamin D deficiency. Once serum phosphorus improves, will start supplementation with cholecalciferol. Hold off on initiation of calcitriol in setting of hyperphosphatemia and try to see the trend with low phosphate diet. Repeat renal function panel and check 25-hydroxy vitamin D level in 1 month.      Lab Results   Component Value Date    SODIUM 135 11/16/2023    K 4.2 11/16/2023     11/16/2023    CO2 21 11/16/2023    BUN 66 (H) 11/16/2023    CREATININE 2.23 (H) 11/16/2023    GLUC 90 08/15/2023    CALCIUM 8.6 11/16/2023

## 2023-11-29 ENCOUNTER — HOSPITAL ENCOUNTER (OUTPATIENT)
Dept: RADIOLOGY | Facility: MEDICAL CENTER | Age: 68
Discharge: HOME | End: 2023-11-29
Payer: COMMERCIAL

## 2023-11-29 VITALS — WEIGHT: 138.5 LBS | BODY MASS INDEX: 29.88 KG/M2 | HEIGHT: 57 IN

## 2023-11-29 DIAGNOSIS — M80.00XS OSTEOPOROSIS WITH CURRENT PATHOLOGICAL FRACTURE, UNSPECIFIED OSTEOPOROSIS TYPE, SEQUELA: ICD-10-CM

## 2023-11-29 PROCEDURE — 77080 DXA BONE DENSITY AXIAL: CPT

## 2023-11-30 DIAGNOSIS — N13.5 URETERAL STRICTURE: ICD-10-CM

## 2023-11-30 DIAGNOSIS — M81.6 LOCALIZED OSTEOPOROSIS, UNSPECIFIED PATHOLOGICAL FRACTURE PRESENCE: Primary | ICD-10-CM

## 2023-11-30 RX ORDER — SODIUM CHLORIDE 9 MG/ML
10 INJECTION INTRAVENOUS DAILY
Qty: 300 ML | Refills: 3 | Status: SHIPPED | OUTPATIENT
Start: 2023-11-30 | End: 2024-03-29

## 2023-12-01 ENCOUNTER — TELEPHONE (OUTPATIENT)
Dept: FAMILY MEDICINE CLINIC | Facility: CLINIC | Age: 68
End: 2023-12-01

## 2023-12-01 NOTE — TELEPHONE ENCOUNTER
----- Message from Noam Muller DO sent at 11/30/2023  3:54 PM EST -----  New dx of Osteoporosis - referral for Rheum in chart - for now, cont Vit D 2000IU QD and Ca++ 1200mg QD

## 2023-12-04 DIAGNOSIS — N39.46 MIXED STRESS AND URGE URINARY INCONTINENCE: ICD-10-CM

## 2023-12-04 RX ORDER — OXYBUTYNIN CHLORIDE 15 MG/1
15 TABLET, EXTENDED RELEASE ORAL
Qty: 90 TABLET | Refills: 1 | Status: SHIPPED | OUTPATIENT
Start: 2023-12-04

## 2023-12-07 ENCOUNTER — TELEPHONE (OUTPATIENT)
Dept: GASTROENTEROLOGY | Facility: CLINIC | Age: 68
End: 2023-12-07

## 2023-12-07 NOTE — TELEPHONE ENCOUNTER
I spoke to pt , her my chart notification came back unopened. , confirmed with pt , she has instructions,a  and aware she is on a clear liquid diet day before. Eh to call day before with procedure time.  Sb

## 2023-12-11 DIAGNOSIS — R60.0 BILATERAL LOWER EXTREMITY EDEMA: ICD-10-CM

## 2023-12-12 ENCOUNTER — HOSPITAL ENCOUNTER (OUTPATIENT)
Dept: GASTROENTEROLOGY | Facility: HOSPITAL | Age: 68
Setting detail: OUTPATIENT SURGERY
Discharge: HOME | End: 2023-12-12
Admitting: INTERNAL MEDICINE
Payer: COMMERCIAL

## 2023-12-12 ENCOUNTER — ANESTHESIA (OUTPATIENT)
Dept: GASTROENTEROLOGY | Facility: HOSPITAL | Age: 68
End: 2023-12-12

## 2023-12-12 ENCOUNTER — ANESTHESIA EVENT (OUTPATIENT)
Dept: GASTROENTEROLOGY | Facility: HOSPITAL | Age: 68
End: 2023-12-12

## 2023-12-12 VITALS
HEIGHT: 57 IN | HEART RATE: 69 BPM | BODY MASS INDEX: 28.52 KG/M2 | SYSTOLIC BLOOD PRESSURE: 121 MMHG | WEIGHT: 132.2 LBS | DIASTOLIC BLOOD PRESSURE: 58 MMHG | TEMPERATURE: 97.6 F | RESPIRATION RATE: 12 BRPM | OXYGEN SATURATION: 100 %

## 2023-12-12 DIAGNOSIS — D50.8 IRON DEFICIENCY ANEMIA SECONDARY TO INADEQUATE DIETARY IRON INTAKE: ICD-10-CM

## 2023-12-12 DIAGNOSIS — R13.10 DYSPHAGIA, UNSPECIFIED TYPE: ICD-10-CM

## 2023-12-12 DIAGNOSIS — Z12.11 COLON CANCER SCREENING: ICD-10-CM

## 2023-12-12 DIAGNOSIS — Z98.84 HISTORY OF ROUX-EN-Y GASTRIC BYPASS: ICD-10-CM

## 2023-12-12 DIAGNOSIS — K21.9 GASTROESOPHAGEAL REFLUX DISEASE, UNSPECIFIED WHETHER ESOPHAGITIS PRESENT: ICD-10-CM

## 2023-12-12 PROCEDURE — G0121 COLON CA SCRN NOT HI RSK IND: HCPCS | Performed by: INTERNAL MEDICINE

## 2023-12-12 PROCEDURE — 43235 EGD DIAGNOSTIC BRUSH WASH: CPT | Performed by: INTERNAL MEDICINE

## 2023-12-12 RX ORDER — SODIUM CHLORIDE, SODIUM LACTATE, POTASSIUM CHLORIDE, CALCIUM CHLORIDE 600; 310; 30; 20 MG/100ML; MG/100ML; MG/100ML; MG/100ML
INJECTION, SOLUTION INTRAVENOUS CONTINUOUS PRN
Status: DISCONTINUED | OUTPATIENT
Start: 2023-12-12 | End: 2023-12-12

## 2023-12-12 RX ORDER — PHENYLEPHRINE HCL IN 0.9% NACL 1 MG/10 ML
SYRINGE (ML) INTRAVENOUS AS NEEDED
Status: DISCONTINUED | OUTPATIENT
Start: 2023-12-12 | End: 2023-12-12

## 2023-12-12 RX ORDER — FUROSEMIDE 20 MG/1
20 TABLET ORAL AS NEEDED
Qty: 90 TABLET | Refills: 1 | Status: SHIPPED | OUTPATIENT
Start: 2023-12-12

## 2023-12-12 RX ORDER — PROPOFOL 10 MG/ML
INJECTION, EMULSION INTRAVENOUS AS NEEDED
Status: DISCONTINUED | OUTPATIENT
Start: 2023-12-12 | End: 2023-12-12

## 2023-12-12 RX ORDER — LIDOCAINE HYDROCHLORIDE 20 MG/ML
INJECTION, SOLUTION EPIDURAL; INFILTRATION; INTRACAUDAL; PERINEURAL AS NEEDED
Status: DISCONTINUED | OUTPATIENT
Start: 2023-12-12 | End: 2023-12-12

## 2023-12-12 RX ORDER — SIMETHICONE 20 MG/.3ML
EMULSION ORAL AS NEEDED
Status: COMPLETED | OUTPATIENT
Start: 2023-12-12 | End: 2023-12-12

## 2023-12-12 RX ADMIN — PROPOFOL 25 MG: 10 INJECTION, EMULSION INTRAVENOUS at 13:39

## 2023-12-12 RX ADMIN — Medication 40 MG: at 13:40

## 2023-12-12 RX ADMIN — PROPOFOL 25 MG: 10 INJECTION, EMULSION INTRAVENOUS at 13:32

## 2023-12-12 RX ADMIN — PROPOFOL 25 MG: 10 INJECTION, EMULSION INTRAVENOUS at 13:28

## 2023-12-12 RX ADMIN — PROPOFOL 25 MG: 10 INJECTION, EMULSION INTRAVENOUS at 13:25

## 2023-12-12 RX ADMIN — SODIUM CHLORIDE, SODIUM LACTATE, POTASSIUM CHLORIDE, AND CALCIUM CHLORIDE: .6; .31; .03; .02 INJECTION, SOLUTION INTRAVENOUS at 12:40

## 2023-12-12 RX ADMIN — PROPOFOL 25 MG: 10 INJECTION, EMULSION INTRAVENOUS at 13:36

## 2023-12-12 RX ADMIN — Medication 100 MCG: at 13:26

## 2023-12-12 RX ADMIN — PROPOFOL 25 MG: 10 INJECTION, EMULSION INTRAVENOUS at 13:34

## 2023-12-12 RX ADMIN — PROPOFOL 25 MG: 10 INJECTION, EMULSION INTRAVENOUS at 13:31

## 2023-12-12 RX ADMIN — PROPOFOL 50 MG: 10 INJECTION, EMULSION INTRAVENOUS at 13:14

## 2023-12-12 RX ADMIN — PROPOFOL 25 MG: 10 INJECTION, EMULSION INTRAVENOUS at 13:42

## 2023-12-12 RX ADMIN — PROPOFOL 25 MG: 10 INJECTION, EMULSION INTRAVENOUS at 13:16

## 2023-12-12 RX ADMIN — PROPOFOL 25 MG: 10 INJECTION, EMULSION INTRAVENOUS at 13:33

## 2023-12-12 RX ADMIN — PROPOFOL 25 MG: 10 INJECTION, EMULSION INTRAVENOUS at 13:19

## 2023-12-12 RX ADMIN — PROPOFOL 25 MG: 10 INJECTION, EMULSION INTRAVENOUS at 13:17

## 2023-12-12 RX ADMIN — LIDOCAINE HYDROCHLORIDE 100 MG: 20 INJECTION, SOLUTION EPIDURAL; INFILTRATION; INTRACAUDAL; PERINEURAL at 13:14

## 2023-12-12 RX ADMIN — PROPOFOL 50 MG: 10 INJECTION, EMULSION INTRAVENOUS at 13:15

## 2023-12-12 RX ADMIN — PROPOFOL 25 MG: 10 INJECTION, EMULSION INTRAVENOUS at 13:45

## 2023-12-12 RX ADMIN — PROPOFOL 25 MG: 10 INJECTION, EMULSION INTRAVENOUS at 13:22

## 2023-12-12 NOTE — H&P
History and Physical - [facility]  Ragini Melendez 79 y.o. female MRN: [id number]                  HPI: Tres Menjivar is a 79y.o. year old female who presents for colon cancer screening, dysphagia      REVIEW OF SYSTEMS: Per the HPI, and otherwise unremarkable.     Historical Information   Past Medical History:   Diagnosis Date    Anemia     Anxiety     Cancer ([address])     ENDOMETRIAL    Chemotherapy induced neutropenia  8/30/2019    CKD (chronic kidney disease) stage 3, GFR 30-59 ml/min ([address])     COVID     Fall 2022    Depression     DVT (deep venous thrombosis) ([address]) 07/19/2019    RIGHT LEG    Endometrial cancer ([address]) 12/2017    GERD (gastroesophageal reflux disease)     H/O gastric bypass     Hard to intubate     pt denies AS OF 7/25/23    History of chemotherapy     started 12/2021endometrial cancer- done 12/23/22    History of DVT in adulthood     RLE    History of transfusion 04/13/2023    Hyperglycemia     vx type 2 dm -- last assessed 4/1/14; resolved 11/7/17    Hyperlipidemia     Hypertension     in past- no meds at present    Iron deficiency anemia     iron infusions weekly    OAB (overactive bladder)     Port-A-Cath in place     Wears glasses      Past Surgical History:   Procedure Laterality Date    ABDOMINAL SURGERY      GASTRIC BYPASS; 2001    BREAST BIOPSY Right 06/28/2019    core biopsy; benign    CHOLECYSTECTOMY      at the time of gastric bypass    COLONOSCOPY      CT NEEDLE BIOPSY LYMPH NODE  07/08/2019    FL GUIDED CENTRAL VENOUS ACCESS DEVICE INSERTION  11/12/2019    FL RETROGRADE PYELOGRAM  03/30/2023    FL RETROGRADE PYELOGRAM  05/09/2023    FL RETROGRADE PYELOGRAM  8/1/2023    GASTRIC BYPASS      HYSTERECTOMY Bilateral 2017    total abdominal with salpingo-oophorectomy    IR NEPHROSTOMY TO NEPHROURETERAL STENT  06/09/2022    IR NEPHROSTOMY TO NEPHROURETERAL STENT  12/20/2022    IR NEPHROSTOMY TO NEPHROURETERAL STENT  8/8/2023    IR NEPHROSTOMY TUBE CHECK/CHANGE/REPOSITION/REINSERTION/UPSIZE  05/27/2022    IR NEPHROSTOMY TUBE CHECK/CHANGE/REPOSITION/REINSERTION/UPSIZE  11/10/2023    IR NEPHROSTOMY TUBE PLACEMENT  07/26/2019    IR NEPHROSTOMY TUBE PLACEMENT  05/27/2023    IR NEPHROURETERAL STENT CHECK/CHANGE/REPOSITION  04/06/2021    IR NEPHROURETERAL STENT CHECK/CHANGE/REPOSITION  06/04/2021    IR NEPHROURETERAL STENT CHECK/CHANGE/REPOSITION  09/03/2021    IR NEPHROURETERAL STENT CHECK/CHANGE/REPOSITION  12/07/2021    IR NEPHROURETERAL STENT CHECK/CHANGE/REPOSITION  03/08/2022    IR NEPHROURETERAL STENT CHECK/CHANGE/REPOSITION  05/10/2022    IR NEPHROURETERAL STENT CHECK/CHANGE/REPOSITION  09/19/2022    IR PICC LINE  09/27/2019    IR PORT PLACEMENT  07/26/2019    IR PORT PLACEMENT      IR PORT REMOVAL  09/20/2019    OOPHORECTOMY Bilateral 2017    AL CYSTO BLADDER W/URETERAL CATHETERIZATION Right 03/30/2023    Procedure: CYSTOSCOPY RETROGRADE PYELOGRAM WITH exchange of STENT URETERAL;  Surgeon: Norma Tierney MD;  [facility];  Service: Urology    AL CYSTO BLADDER W/URETERAL CATHETERIZATION Bilateral 05/09/2023    Procedure: CYSTOSCOPY, BILATERAL RETROGRADE PYELOGRAM, WITH LEFT INSERTION STENT URETERAL, RIGHT URTERAL STENT EXCHANGE;  Surgeon: Jo Bass MD;  [facility];  Service: Urology    AL CYSTO BLADDER W/URETERAL CATHETERIZATION Bilateral 8/1/2023    Procedure: CYSTOSCOPY BILATERAL RETROGRADE  WITH REMOVAL BILATERAL  STENT URETERAL;  Surgeon: Norma Tierney MD;  [facility];  Service: Urology    AL CYSTO W/INSERT URETERAL STENT Right 03/30/2023    Procedure: EXCHANGE STENT URETERAL;  Surgeon: Norma Tierney MD;  [facility];  Service: Urology    AL INSJ TUNNELED CTR VAD W/SUBQ PORT AGE 7 YR/> Left 11/12/2019    Procedure: INSERTION VENOUS PORT ( PORT-A-CATH) IR;  Surgeon: Mitali Moseley DO;  [facility];  Service: Interventional Radiology    AL LAPS ABD PRTM&OMENTUM DX W/WO SPEC BR/WA SPX N/A 12/19/2017    Procedure: LAPAROSCOPY DIAGNOSTIC;  Surgeon: Rossy Cid MD;  [facility];  Service: Gynecology Oncology    NC LAPS Jamaal Speedy HYST W/BILAT QUARTERMAIN RMVL TUBE/OVARY N/A 12/19/2017    Procedure: HYSTERECTOMY LAPAROSCOPIC TOTAL ([address]) W/ ROBOTICS; BILATERAL SALPINGOOPHERECTOMY; LYMPH NODE DISSECTION; lysis of adhesions;  Surgeon: Rossy Cid MD;  [facility];  Service: Gynecology Oncology    REMOVAL URETERAL STENT Bilateral 8/1/2023    Procedure: REMOVAL STENT URETERAL;  Surgeon: Tamara Gallegos MD;  [facility];  Service: Urology    TONSILLECTOMY      US GUIDED BREAST BIOPSY RIGHT COMPLETE Right 06/28/2019     Social History   Social History     Substance and Sexual Activity   Alcohol Use Never     Social History     Substance and Sexual Activity   Drug Use Never     Social History     Tobacco Use   Smoking Status Never   Smokeless Tobacco Never     Family History   Problem Relation Age of Onset    Hyperlipidemia Mother     Heart disease Mother     Ovarian cancer Mother 48    Colon cancer Mother     Lymphoma Father     Breast cancer Sister 61    No Known Problems Brother     No Known Problems Brother     No Known Problems Maternal Grandmother     Bone cancer Maternal Grandfather     Uterine cancer Paternal Grandmother     No Known Problems Paternal Grandfather     No Known Problems Maternal Aunt     No Known Problems Maternal Aunt     No Known Problems Maternal Aunt     No Known Problems Maternal Aunt     No Known Problems Paternal Aunt     No Known Problems Paternal Aunt     No Known Problems Paternal Aunt     No Known Problems Paternal Aunt        Meds/Allergies       Current Outpatient Medications:     ARIPiprazole (ABILIFY) 20 MG tablet    BD PosiFlush 0.9 % SOLN    Calcium-Magnesium-Vitamin D (CALCIUM 500 PO)    cholecalciferol (VITAMIN D3) 1,000 units tablet    Cyanocobalamin (VITAMIN Q60 PO)    folic acid (FOLVITE) 1 mg tablet    Multiple Vitamin (MULTIVITAMIN) tablet    oxybutynin (DITROPAN XL) 15 MG 24 hr tablet    saccharomyces boulardii (FLORASTOR) 250 mg capsule    sodium bicarbonate 650 mg tablet    sodium chloride, PF, 0.9 %    sodium chloride, PF, 0.9 %    venlafaxine (EFFEXOR-XR) 75 mg 24 hr capsule    Vibegron (Gemtesa) 75 MG TABS    dronabinol (MARINOL) 2.5 mg capsule    furosemide (LASIX) 20 mg tablet    omeprazole (PriLOSEC) 20 mg delayed release capsule    polyethylene glycol-electrolytes (TriLyte) 4000 mL solution    senna (SENOKOT) 8.6 MG tablet    sodium chloride, PF, 0.9 %    Allergies   Allergen Reactions    Cephalosporins Rash     Which Cephalosporin reaction was to not specified; however, has tolerated Amoxicillin, Cefazolin, and Cefepime       Objective     /63   Pulse 76   Temp 97.7 °F (36.5 °C) (Temporal)   Resp (!) 23   Ht 4' 9" (1.448 m)   Wt 60 kg (132 lb 3.2 oz)   LMP  (LMP Unknown)   SpO2 100%   BMI 28.61 kg/m²       PHYSICAL EXAM    Gen: NAD  Head: NCAT  CV: RRR  CHEST: Clear  ABD: soft, NT/ND  EXT: no edema      ASSESSMENT/PLAN:  This is a 79y.o. year old female here for colonoscopy, EGD, and she is stable and optimized for her procedure.

## 2023-12-12 NOTE — ANESTHESIA PREPROCEDURE EVALUATION
Procedure:  COLONOSCOPY  EGD    Relevant Problems   CARDIO   (+) Benign essential hypertension   (+) Left-sided chest wall pain      /RENAL   (+) Stage 3 chronic kidney disease (HCC)      GYN   (+) Endometrial cancer (HCC)      HEMATOLOGY   (+) Anemia      MUSCULOSKELETAL   (+) Bilateral piriformis syndrome   (+) Myofascial pain syndrome      NEURO/PSYCH   (+) Chronic pain syndrome   (+) Depression, recurrent (HCC)   (+) Major depression, chronic   (+) Myofascial pain syndrome      Genitourinary   (+) Chronic UTI   (+) Overactive bladder      Other   (+) Dyslipidemia   (+) History of DVT (deep vein thrombosis)   (+) MGUS (monoclonal gammopathy of unknown significance)        Physical Exam    Airway    Mallampati score: II  TM Distance: >3 FB  Neck ROM: full     Dental   No notable dental hx     Cardiovascular  Rhythm: regular, Rate: normal, Cardiovascular exam normal    Pulmonary  Pulmonary exam normal Breath sounds clear to auscultation    Other Findings  post-pubertal.      Anesthesia Plan  ASA Score- 3     Anesthesia Type- IV sedation with anesthesia with ASA Monitors. Additional Monitors:     Airway Plan:            Plan Factors-Exercise tolerance (METS): <4 METS. Exercise comment: Denies orthopnea/PND. Chart reviewed. Existing labs reviewed. Patient summary reviewed. Patient is not a current smoker. Induction-     Postoperative Plan-     Informed Consent- Anesthetic plan and risks discussed with patient. I personally reviewed this patient with the CRNA. Discussed and agreed on the Anesthesia Plan with the CRNA. Elroy Speaker

## 2023-12-12 NOTE — ANESTHESIA POSTPROCEDURE EVALUATION
Post-Op Assessment Note    CV Status:  Stable    Pain management: adequate       Mental Status:  Awake and sleepy   Hydration Status:  Euvolemic   PONV Controlled:  Controlled   Airway Patency:  Patent     Post Op Vitals Reviewed: Yes      Staff: Anesthesiologist, CRNA               /55 (12/12/23 1354)    Temp (!) 97.4 °F (36.3 °C) (12/12/23 1354)    Pulse 70 (12/12/23 1354)   Resp 16 (12/12/23 1354)    SpO2 98 % (12/12/23 1354)

## 2023-12-20 ENCOUNTER — TELEPHONE (OUTPATIENT)
Dept: UROLOGY | Facility: MEDICAL CENTER | Age: 68
End: 2023-12-20

## 2023-12-22 ENCOUNTER — HOSPITAL ENCOUNTER (OUTPATIENT)
Dept: INFUSION CENTER | Facility: CLINIC | Age: 68
Discharge: HOME | End: 2023-12-22

## 2023-12-22 DIAGNOSIS — Z45.2 ENCOUNTER FOR VENOUS ACCESS DEVICE CARE: ICD-10-CM

## 2023-12-22 DIAGNOSIS — C54.1 ENDOMETRIAL CANCER (HCC): Primary | ICD-10-CM

## 2023-12-22 NOTE — PROGRESS NOTES
Pt. offers no complaints.  Port flushed per protocol, good blood return noted.  AVS declined.  Next appt. confirmed.

## 2023-12-28 ENCOUNTER — TELEPHONE (OUTPATIENT)
Age: 68
End: 2023-12-28

## 2023-12-28 NOTE — ASSESSMENT & PLAN NOTE
I have discussed case with Dr Lowe on multiple occasions. Pt has been counseled regarding options and potential outcomes including permanent PCNs, ileal conduit, failed ileal conduit, etc.   Patient reports that given her worsening kidney failure and potential need for kidney transplant that she would like to wait to see what happens with her lab work at this time.  Given the potential for transplant utilizing an intact bladder she does not want to potentially undergo an unnecessary procedure with failing kidneys.  I would agree with this plan as the surgery is not without risk.  However, if her kidney function stabilizes and she maintains function we will readdress the issue and potential for surgery

## 2023-12-28 NOTE — PROGRESS NOTES
Assessment/Plan:    Problem List Items Addressed This Visit       Encounter for follow-up surveillance of endometrial cancer - Primary     68-year-old with h/o recurrent stage IB endometrial cancer in remission presents for follow up.    Clinically SABAS.    >2.5 years SABAS and >1 year off treatment.  Will continue with every 6 month surveillance visits or sooner with symptoms.  Signs and symptoms of concern were discussed         Nephrostomy status (HCC)     I have discussed case with Dr Lowe on multiple occasions. Pt has been counseled regarding options and potential outcomes including permanent PCNs, ileal conduit, failed ileal conduit, etc.   Patient reports that given her worsening kidney failure and potential need for kidney transplant that she would like to wait to see what happens with her lab work at this time.  Given the potential for transplant utilizing an intact bladder she does not want to potentially undergo an unnecessary procedure with failing kidneys.  I would agree with this plan as the surgery is not without risk.  However, if her kidney function stabilizes and she maintains function we will readdress the issue and potential for surgery         Stage 4 chronic kidney disease (HCC)     Lab Results   Component Value Date    EGFR 22 11/16/2023    EGFR 19 10/13/2023    EGFR 23 09/06/2023    CREATININE 2.23 (H) 11/16/2023    CREATININE 2.48 (H) 10/13/2023    CREATININE 2.11 (H) 09/06/2023     Patient is closely followed with nephrology.  She is currently undergoing discussion regarding dialysis and possible kidney transplant.  Continue lab work per nephrology.              CHIEF COMPLAINT: follow up       Problem:   Cancer Staging   Endometrial cancer (HCC)  Staging form: Corpus Uteri - Carcinoma, AJCC 7th Edition  - Clinical stage from 12/19/2017: FIGO Stage IB (T1b, N0, M0) - Signed by Evgeny So MD on 2/12/2018        Previous therapy:  Oncology History   Endometrial cancer (HCC)    11/17/2017 Initial Diagnosis    Endometrial cancer (HCC)     11/17/2017 Biopsy    ENDOMETRIAL BIOPSY: WELL DIFFERENTIATED ENDOMETRIAL ADENOCARCINOMA (FIGO I) WITH FOCALMUCINOUS FEATURES.    Part B: ENDOCERVICAL POLYP:BENIGN ENDOCERVICAL      12/19/2017 Surgery    Robotic assisted total laparoscopic hysterectomy with bilateral salpingo-oophorectomy and sentinel bilateral pelvic lymph node dissection. 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 (4.4 x 3.2 cm tumor, 9.4/15.4mm invasion, NO LVSI, washings revealed atypical cellular changes)     12/19/2017 Genetic Testing    Morrison testing negative     6/28/2019 Biopsy    A. Breast, Right, US BX Right Breast 1000 4 cmfn:  - Benign breast tissue with focal histiocytic aggregate.  See comment.  - Negative for atypia and in-situ or invasive carcinoma.     7/8/2019 Recurrence    Presented with right lower extremity DVT and CT demonstrating right pelvic sidewall mass with venous, ureteral and nerve compression causing significant neuropathic pain.      Biopsy:  Lymph Node, Right pelvic lymph node x3:  - High-grade adenocarcinoma.      7/30/2019 - 1/6/2020 Chemotherapy    Taxol 175 mg/m2 and carboplatin AUC 6 every 21 days. Dose was reduced to taxol 135 mg/m2 and carboplatin AUC 5. Completed 6 cycles.  Treatment protracted due to multiple hospital admissions.     2/24/2020 - 4/13/2020 Radiation    adjuvant external beam radiation therapy to the whole pelvis to 4500 cGy followed by boost to gross disease of an additional 2200 cGy     11/23/2020 Progression    New necrotic adenopathy in the retroperitoneum on CT.     12/21/2020 - 12/5/2022 Chemotherapy    Began chemotherapy with rucaparib, atezolizumab, and bevacizumab as per EndoBARR clinical trial.    Rucaparib held cycle 28 secondary to fatigue  Avastin held since cycle 30 for elevated UPC  Treatment held for one cycle after cycle 31 for mucositis/fatigue             Patient ID: Ragini Melendez is a 68 y.o.  "female  68-year-old with h/o recurrent stage IB endometrial cancer in remission presents for follow up. Pt with new diagnosis of osteoporosis. Pt has followed up with urology after failed PCN reversal for continued discussion regarding surgical options. Last imaging 5/2023 SABAS.  Patient reports continued decline in her kidney function with ongoing discussion regarding hemodialysis in the near future.  She reports overall discomfort with the PCNs however she has \"figured it out\" and is less uncomfortable as she was initially.  She denies any abdominal pain or bloating.  No vaginal bleeding or discharge.  She still has some urinary incontinence at baseline      The following portions of the patient's history were reviewed and updated as appropriate: allergies, current medications, past family history, past medical history, past social history, past surgical history, and problem list.    Review of Systems   Constitutional: Negative.    HENT: Negative.     Eyes: Negative.    Respiratory: Negative.     Cardiovascular: Negative.    Gastrointestinal: Negative.    Endocrine: Negative.    Genitourinary:  Positive for flank pain.   Neurological: Negative.    Psychiatric/Behavioral: Negative.         Current Outpatient Medications   Medication Sig Dispense Refill    ARIPiprazole (ABILIFY) 20 MG tablet Take 20 mg by mouth every morning      BD PosiFlush 0.9 % SOLN       Calcium-Magnesium-Vitamin D (CALCIUM 500 PO) Take 1 capsule by mouth daily at bedtime      cholecalciferol (VITAMIN D3) 1,000 units tablet Take 4 tablets (4,000 Units total) by mouth daily (Patient taking differently: Take 4,000 Units by mouth daily at bedtime) 90 tablet 0    Cyanocobalamin (VITAMIN B12 PO) Take 1 capsule by mouth daily after lunch      dronabinol (MARINOL) 2.5 mg capsule Take 1 capsule (2.5 mg total) by mouth 2 (two) times a day before meals 30 capsule 0    folic acid (FOLVITE) 1 mg tablet Take 1 tablet (1 mg total) by mouth daily (Patient " taking differently: Take 1 mg by mouth daily at bedtime)  0    furosemide (LASIX) 20 mg tablet TAKE 1 TABLET (20 MG TOTAL) BY MOUTH IF NEEDED (FOR INCREASED LEG SWELLING OR WEIGHT GAIN >2 LBS IN 1 DAY) 90 tablet 1    Multiple Vitamin (MULTIVITAMIN) tablet Take 1 tablet by mouth every morning      oxybutynin (DITROPAN XL) 15 MG 24 hr tablet Take 1 tablet (15 mg total) by mouth daily at bedtime 90 tablet 1    saccharomyces boulardii (FLORASTOR) 250 mg capsule Take 1 capsule (250 mg total) by mouth daily after lunch      sodium bicarbonate 650 mg tablet Take 1 tablet (650 mg total) by mouth 3 (three) times a day 270 tablet 3    sodium chloride, PF, 0.9 % 10 mL by Intracatheter route daily for 120 doses Intracatheter flushing daily. May substitute prefilled syringe with normal saline 10 mL vials, 10 mL syringes, and 18 g blunt needles 300 mL 3    venlafaxine (EFFEXOR-XR) 75 mg 24 hr capsule TAKE 3 CAPS DAILY      Vibegron (Gemtesa) 75 MG TABS Take 1 capsule by mouth every morning 90 tablet 3    omeprazole (PriLOSEC) 20 mg delayed release capsule Take 20 mg by mouth every morning (Patient not taking: Reported on 12/29/2023)      polyethylene glycol-electrolytes (TriLyte) 4000 mL solution Take 4,000 mL by mouth once for 1 dose Take 4000 mL by mouth once for 1 dose. Use as directed (Patient not taking: Reported on 11/8/2023) 4000 mL 0    senna (SENOKOT) 8.6 MG tablet Take 1 tablet (8.6 mg total) by mouth 2 (two) times a day as needed for constipation (Patient not taking: Reported on 11/8/2023) 60 tablet 0    sodium chloride, PF, 0.9 % 10 mL by Intracatheter route daily Intracatheter flushing daily. May substitute prefilled syringe with normal saline 10 mL vials, 10 mL syringes, and 18 g blunt needles 300 mL 2    sodium chloride, PF, 0.9 % 10 mL by Intracatheter route daily for 120 doses Intracatheter flushing daily. May substitute prefilled syringe with normal saline 10 mL vials, 10 mL syringes, and 18 g blunt needles  "(Patient not taking: Reported on 12/29/2023) 300 mL 3     No current facility-administered medications for this visit.           Objective:    Blood pressure 126/80, pulse 66, height 4' 9\" (1.448 m), weight 65.5 kg (144 lb 8 oz), SpO2 99%, not currently breastfeeding.  Body mass index is 31.27 kg/m².  Body surface area is 1.57 meters squared.    Physical Exam  HENT:      Head: Normocephalic and atraumatic.   Cardiovascular:      Rate and Rhythm: Normal rate and regular rhythm.   Pulmonary:      Effort: Pulmonary effort is normal.   Abdominal:      General: There is no distension.      Palpations: Abdomen is soft. There is no mass.   Genitourinary:     Comments: The external female genitalia is normal. The bartholin's, uretheral and skenes glands are normal. The urethral meatus is normal (midline with no lesions). Anus without fissure or lesion. Speculum exam reveals a grossly normal vagina cuff with significant radiation effect.  No masses, lesions,discharge or bleeding. No significant cystocele or rectocele noted. Bimanual exam notes a surgical absent cervix, uterus and adnexal structures. No masses or fullness.  Bilateral pelvic fibrosis is noted on palpation.  Bladder is without fullness, mass or tenderness.    Musculoskeletal:         General: Normal range of motion.      Cervical back: Normal range of motion.   Skin:     General: Skin is warm and dry.   Neurological:      Mental Status: She is alert and oriented to person, place, and time.           Lab Results   Component Value Date     10/27/2017    K 4.2 11/16/2023     11/16/2023    CO2 21 11/16/2023    BUN 66 (H) 11/16/2023    CREATININE 2.23 (H) 11/16/2023    GLUCOSE 219 (H) 12/19/2017    GLUF 125 (H) 11/16/2023    CALCIUM 8.6 11/16/2023    CORRECTEDCA 9.8 07/27/2023    AST 15 08/14/2023    ALT 14 08/14/2023    ALKPHOS 133 (H) 08/14/2023    PROT 6.9 10/27/2017    BILITOT 0.3 10/27/2017    EGFR 22 11/16/2023     Lab Results   Component Value " "Date    WBC 8.11 11/16/2023    HGB 9.8 (L) 11/16/2023    HCT 33.2 (L) 11/16/2023    MCV 95 11/16/2023     11/16/2023     Lab Results   Component Value Date    NEUTROABS 6.64 11/16/2023        Trend:  No results found for: \"\"     "

## 2023-12-28 NOTE — TELEPHONE ENCOUNTER
PA for GEMTESA    Submitted via  [x]CMM-KEY  [id number]   []Surescripts-Case ID #  []Faxed to plan   []Other website     Office notes sent, clinical questions answered. Awaiting determination

## 2023-12-28 NOTE — TELEPHONE ENCOUNTER
Reason for call: Patient called Stating that she received a letter from [facility] that a new prior Auth is needed   [] Prior Auth  [] Other:     Caller:  [x] Patient  [] Pharmacy  Name:   Address:   Callback Number:     Medication: Vibegron (Gemtesa) 75 MG TABS         Dose/Frequency: Take 1 capsule by mouth every morning,     Quantity: 90    Ordering Provider:   [] PCP/Provider -   [x] Speciality/Provider - Urology/ [facility]    Has the patient tried other medications and failed? If failed, which medications did they fail?    [] No   [x] Yes -     Is the patient's insurance updated in EPIC?   [] Yes   [] No     Is a copy of the patient's insurance scanned in EPIC?   [] Yes   [] No     [facility] [phone]  RX BIN [id number]  RX PCN [id number]  GROUP [id number]

## 2023-12-28 NOTE — TELEPHONE ENCOUNTER
PA for GEMTESA  Approved   Date(s) approved until 12/31/2024      Patient advised by [x] Board a [employer] Message                      [] Phone call       Pharmacy advised by [x]Fax                                     []Phone call    Approval letter scanned into Media Yes

## 2023-12-28 NOTE — ASSESSMENT & PLAN NOTE
68-year-old with h/o recurrent stage IB endometrial cancer in remission presents for follow up.    Clinically SABAS.    >2.5 years SABAS and >1 year off treatment.  Will continue with every 6 month surveillance visits or sooner with symptoms.  Signs and symptoms of concern were discussed

## 2023-12-29 ENCOUNTER — OFFICE VISIT (OUTPATIENT)
Dept: GYNECOLOGIC ONCOLOGY | Facility: CLINIC | Age: 68
End: 2023-12-29
Payer: COMMERCIAL

## 2023-12-29 VITALS
HEIGHT: 57 IN | SYSTOLIC BLOOD PRESSURE: 126 MMHG | OXYGEN SATURATION: 99 % | DIASTOLIC BLOOD PRESSURE: 80 MMHG | HEART RATE: 66 BPM | BODY MASS INDEX: 31.17 KG/M2 | WEIGHT: 144.5 LBS

## 2023-12-29 DIAGNOSIS — N18.4 STAGE 4 CHRONIC KIDNEY DISEASE (HCC): ICD-10-CM

## 2023-12-29 DIAGNOSIS — Z85.42 ENCOUNTER FOR FOLLOW-UP SURVEILLANCE OF ENDOMETRIAL CANCER: Primary | ICD-10-CM

## 2023-12-29 DIAGNOSIS — Z08 ENCOUNTER FOR FOLLOW-UP SURVEILLANCE OF ENDOMETRIAL CANCER: Primary | ICD-10-CM

## 2023-12-29 DIAGNOSIS — Z93.6 NEPHROSTOMY STATUS (HCC): ICD-10-CM

## 2023-12-29 PROCEDURE — 99215 OFFICE O/P EST HI 40 MIN: CPT | Performed by: OBSTETRICS & GYNECOLOGY

## 2023-12-29 NOTE — ASSESSMENT & PLAN NOTE
Lab Results   Component Value Date    EGFR 22 11/16/2023    EGFR 19 10/13/2023    EGFR 23 09/06/2023    CREATININE 2.23 (H) 11/16/2023    CREATININE 2.48 (H) 10/13/2023    CREATININE 2.11 (H) 09/06/2023     Patient is closely followed with nephrology.  She is currently undergoing discussion regarding dialysis and possible kidney transplant.  Continue lab work per nephrology.

## 2024-01-02 NOTE — ASSESSMENT & PLAN NOTE
Tetracycline Counseling: Patient counseled regarding possible photosensitivity and increased risk for sunburn.  Patient instructed to avoid sunlight, if possible.  When exposed to sunlight, patients should wear protective clothing, sunglasses, and sunscreen.  The patient was instructed to call the office immediately if the following severe adverse effects occur:  hearing changes, easy bruising/bleeding, severe headache, or vision changes.  The patient verbalized understanding of the proper use and possible adverse effects of tetracycline.  All of the patient's questions and concerns were addressed. Patient understands to avoid pregnancy while on therapy due to potential birth defects. · Hemoglobin 7 5 on admission, repeat 9 2  · Will monitor with CBC, consider transfusion for hemoglobin less than 7 or symptomatic Doxycycline Pregnancy And Lactation Text: This medication is Pregnancy Category D and not consider safe during pregnancy. It is also excreted in breast milk but is considered safe for shorter treatment courses. Topical Retinoid counseling:  Patient advised to apply a pea-sized amount only at bedtime and wait 30 minutes after washing their face before applying.  If too drying, patient may add a non-comedogenic moisturizer. The patient verbalized understanding of the proper use and possible adverse effects of retinoids.  All of the patient's questions and concerns were addressed. Dapsone Pregnancy And Lactation Text: This medication is Pregnancy Category C and is not considered safe during pregnancy or breast feeding. Topical Sulfur Applications Counseling: Topical Sulfur Counseling: Patient counseled that this medication may cause skin irritation or allergic reactions.  In the event of skin irritation, the patient was advised to reduce the amount of the drug applied or use it less frequently.   The patient verbalized understanding of the proper use and possible adverse effects of topical sulfur application.  All of the patient's questions and concerns were addressed. Spironolactone Counseling: Patient advised regarding risks of diarrhea, abdominal pain, hyperkalemia, birth defects (for female patients), liver toxicity and renal toxicity. The patient may need blood work to monitor liver and kidney function and potassium levels while on therapy. The patient verbalized understanding of the proper use and possible adverse effects of spironolactone.  All of the patient's questions and concerns were addressed. Bactrim Pregnancy And Lactation Text: This medication is Pregnancy Category D and is known to cause fetal risk.  It is also excreted in breast milk. Erythromycin Pregnancy And Lactation Text: This medication is Pregnancy Category B and is considered safe during pregnancy. It is also excreted in breast milk. Tetracycline Pregnancy And Lactation Text: This medication is Pregnancy Category D and not consider safe during pregnancy. It is also excreted in breast milk. Tazorac Counseling:  Patient advised that medication is irritating and drying.  Patient may need to apply sparingly and wash off after an hour before eventually leaving it on overnight.  The patient verbalized understanding of the proper use and possible adverse effects of tazorac.  All of the patient's questions and concerns were addressed. Detail Level: Zone Use Enhanced Medication Counseling?: No Benzoyl Peroxide Counseling: Patient counseled that medicine may cause skin irritation and bleach clothing.  In the event of skin irritation, the patient was advised to reduce the amount of the drug applied or use it less frequently.   The patient verbalized understanding of the proper use and possible adverse effects of benzoyl peroxide.  All of the patient's questions and concerns were addressed. Minocycline Counseling: Patient advised regarding possible photosensitivity and discoloration of the teeth, skin, lips, tongue and gums.  Patient instructed to avoid sunlight, if possible.  When exposed to sunlight, patients should wear protective clothing, sunglasses, and sunscreen.  The patient was instructed to call the office immediately if the following severe adverse effects occur:  hearing changes, easy bruising/bleeding, severe headache, or vision changes.  The patient verbalized understanding of the proper use and possible adverse effects of minocycline.  All of the patient's questions and concerns were addressed. Azithromycin Counseling:  I discussed with the patient the risks of azithromycin including but not limited to GI upset, allergic reaction, drug rash, diarrhea, and yeast infections. Erythromycin Counseling:  I discussed with the patient the risks of erythromycin including but not limited to GI upset, allergic reaction, drug rash, diarrhea, increase in liver enzymes, and yeast infections. High Dose Vitamin A Pregnancy And Lactation Text: High dose vitamin A therapy is contraindicated during pregnancy and breast feeding. Topical Retinoid Pregnancy And Lactation Text: This medication is Pregnancy Category C. It is unknown if this medication is excreted in breast milk. High Dose Vitamin A Counseling: Side effects reviewed, pt to contact office should one occur. Isotretinoin Counseling: Patient should get monthly blood tests, not donate blood, not drive at night if vision affected, not share medication, and not undergo elective surgery for 6 months after tx completed. Side effects reviewed, pt to contact office should one occur. Dapsone Counseling: I discussed with the patient the risks of dapsone including but not limited to hemolytic anemia, agranulocytosis, rashes, methemoglobinemia, kidney failure, peripheral neuropathy, headaches, GI upset, and liver toxicity.  Patients who start dapsone require monitoring including baseline LFTs and weekly CBCs for the first month, then every month thereafter.  The patient verbalized understanding of the proper use and possible adverse effects of dapsone.  All of the patient's questions and concerns were addressed. Azithromycin Pregnancy And Lactation Text: This medication is considered safe during pregnancy and is also secreted in breast milk. Topical Clindamycin Counseling: Patient counseled that this medication may cause skin irritation or allergic reactions.  In the event of skin irritation, the patient was advised to reduce the amount of the drug applied or use it less frequently.   The patient verbalized understanding of the proper use and possible adverse effects of clindamycin.  All of the patient's questions and concerns were addressed. Topical Sulfur Applications Pregnancy And Lactation Text: This medication is Pregnancy Category C and has an unknown safety profile during pregnancy. It is unknown if this topical medication is excreted in breast milk. Doxycycline Counseling:  Patient counseled regarding possible photosensitivity and increased risk for sunburn.  Patient instructed to avoid sunlight, if possible.  When exposed to sunlight, patients should wear protective clothing, sunglasses, and sunscreen.  The patient was instructed to call the office immediately if the following severe adverse effects occur:  hearing changes, easy bruising/bleeding, severe headache, or vision changes.  The patient verbalized understanding of the proper use and possible adverse effects of doxycycline.  All of the patient's questions and concerns were addressed. Birth Control Pills Counseling: Birth Control Pill Counseling: I discussed with the patient the potential side effects of OCPs including but not limited to increased risk of stroke, heart attack, thrombophlebitis, deep venous thrombosis, hepatic adenomas, breast changes, GI upset, headaches, and depression.  The patient verbalized understanding of the proper use and possible adverse effects of OCPs. All of the patient's questions and concerns were addressed. Sarecycline Counseling: Patient advised regarding possible photosensitivity and discoloration of the teeth, skin, lips, tongue and gums.  Patient instructed to avoid sunlight, if possible.  When exposed to sunlight, patients should wear protective clothing, sunglasses, and sunscreen.  The patient was instructed to call the office immediately if the following severe adverse effects occur:  hearing changes, easy bruising/bleeding, severe headache, or vision changes.  The patient verbalized understanding of the proper use and possible adverse effects of sarecycline.  All of the patient's questions and concerns were addressed. Spironolactone Pregnancy And Lactation Text: This medication can cause feminization of the male fetus and should be avoided during pregnancy. The active metabolite is also found in breast milk. Topical Clindamycin Pregnancy And Lactation Text: This medication is Pregnancy Category B and is considered safe during pregnancy. It is unknown if it is excreted in breast milk. Birth Control Pills Pregnancy And Lactation Text: This medication should be avoided if pregnant and for the first 30 days post-partum. Tazorac Pregnancy And Lactation Text: This medication is not safe during pregnancy. It is unknown if this medication is excreted in breast milk. Benzoyl Peroxide Pregnancy And Lactation Text: This medication is Pregnancy Category C. It is unknown if benzoyl peroxide is excreted in breast milk. Bactrim Counseling:  I discussed with the patient the risks of sulfa antibiotics including but not limited to GI upset, allergic reaction, drug rash, diarrhea, dizziness, photosensitivity, and yeast infections.  Rarely, more serious reactions can occur including but not limited to aplastic anemia, agranulocytosis, methemoglobinemia, blood dyscrasias, liver or kidney failure, lung infiltrates or desquamative/blistering drug rashes. Isotretinoin Pregnancy And Lactation Text: This medication is Pregnancy Category X and is considered extremely dangerous during pregnancy. It is unknown if it is excreted in breast milk. Aklief Pregnancy And Lactation Text: It is unknown if this medication is safe to use during pregnancy.  It is unknown if this medication is excreted in breast milk.  Breastfeeding women should use the topical cream on the smallest area of the skin for the shortest time needed while breastfeeding.  Do not apply to nipple and areola. Winlevi Pregnancy And Lactation Text: This medication is considered safe during pregnancy and breastfeeding. Azelaic Acid Counseling: Patient counseled that medicine may cause skin irritation and to avoid applying near the eyes.  In the event of skin irritation, the patient was advised to reduce the amount of the drug applied or use it less frequently.   The patient verbalized understanding of the proper use and possible adverse effects of azelaic acid.  All of the patient's questions and concerns were addressed. Winlevi Counseling:  I discussed with the patient the risks of topical clascoterone including but not limited to erythema, scaling, itching, and stinging. Patient voiced their understanding. Aklief counseling:  Patient advised to apply a pea-sized amount only at bedtime and wait 30 minutes after washing their face before applying.  If too drying, patient may add a non-comedogenic moisturizer.  The most commonly reported side effects including irritation, redness, scaling, dryness, stinging, burning, itching, and increased risk of sunburn.  The patient verbalized understanding of the proper use and possible adverse effects of retinoids.  All of the patient's questions and concerns were addressed. Azelaic Acid Pregnancy And Lactation Text: This medication is considered safe during pregnancy and breast feeding. Detail Level: Detailed Petrolatum Recommendations: Dr. Dan’s chapstick PRN Aquaphor Recommendations: Aquaphor multiple times a day Detail Level: Generalized

## 2024-01-20 ENCOUNTER — APPOINTMENT (OUTPATIENT)
Dept: LAB | Facility: CLINIC | Age: 69
End: 2024-01-20
Payer: COMMERCIAL

## 2024-01-20 DIAGNOSIS — I10 BENIGN ESSENTIAL HYPERTENSION: ICD-10-CM

## 2024-01-20 DIAGNOSIS — E83.42 HYPOMAGNESEMIA: ICD-10-CM

## 2024-01-20 LAB
25(OH)D3 SERPL-MCNC: 48.2 NG/ML (ref 30–100)
ALBUMIN SERPL BCP-MCNC: 4.2 G/DL (ref 3.5–5)
ANION GAP SERPL CALCULATED.3IONS-SCNC: 9 MMOL/L
BUN SERPL-MCNC: 66 MG/DL (ref 5–25)
CALCIUM SERPL-MCNC: 8.9 MG/DL (ref 8.4–10.2)
CHLORIDE SERPL-SCNC: 104 MMOL/L (ref 96–108)
CO2 SERPL-SCNC: 23 MMOL/L (ref 21–32)
CREAT SERPL-MCNC: 2.36 MG/DL (ref 0.6–1.3)
GFR SERPL CREATININE-BSD FRML MDRD: 20 ML/MIN/1.73SQ M
GLUCOSE SERPL-MCNC: 89 MG/DL (ref 65–140)
PHOSPHATE SERPL-MCNC: 5.1 MG/DL (ref 2.3–4.1)
POTASSIUM SERPL-SCNC: 4.3 MMOL/L (ref 3.5–5.3)
SODIUM SERPL-SCNC: 136 MMOL/L (ref 135–147)

## 2024-01-20 PROCEDURE — 36415 COLL VENOUS BLD VENIPUNCTURE: CPT

## 2024-01-20 PROCEDURE — 82306 VITAMIN D 25 HYDROXY: CPT

## 2024-01-20 PROCEDURE — 80069 RENAL FUNCTION PANEL: CPT

## 2024-01-23 ENCOUNTER — OFFICE VISIT (OUTPATIENT)
Dept: NEPHROLOGY | Facility: CLINIC | Age: 69
End: 2024-01-23
Payer: COMMERCIAL

## 2024-01-23 VITALS
DIASTOLIC BLOOD PRESSURE: 60 MMHG | HEIGHT: 57 IN | SYSTOLIC BLOOD PRESSURE: 118 MMHG | HEART RATE: 64 BPM | WEIGHT: 146 LBS | BODY MASS INDEX: 31.5 KG/M2

## 2024-01-23 DIAGNOSIS — N25.81 SECONDARY HYPERPARATHYROIDISM OF RENAL ORIGIN (HCC): ICD-10-CM

## 2024-01-23 DIAGNOSIS — E83.39 HYPERPHOSPHATEMIA: ICD-10-CM

## 2024-01-23 DIAGNOSIS — D63.1 ANEMIA IN STAGE 4 CHRONIC KIDNEY DISEASE: ICD-10-CM

## 2024-01-23 DIAGNOSIS — N18.4 ANEMIA IN STAGE 4 CHRONIC KIDNEY DISEASE: ICD-10-CM

## 2024-01-23 DIAGNOSIS — Z86.39 HISTORY OF METABOLIC ACIDOSIS: ICD-10-CM

## 2024-01-23 DIAGNOSIS — N18.4 STAGE 4 CHRONIC KIDNEY DISEASE (HCC): Primary | ICD-10-CM

## 2024-01-23 DIAGNOSIS — R80.9 NON-NEPHROTIC RANGE PROTEINURIA: ICD-10-CM

## 2024-01-23 DIAGNOSIS — Z85.42 HISTORY OF ENDOMETRIAL CANCER: ICD-10-CM

## 2024-01-23 PROCEDURE — 99214 OFFICE O/P EST MOD 30 MIN: CPT | Performed by: INTERNAL MEDICINE

## 2024-01-23 RX ORDER — CALCIUM ACETATE 667 MG/1
667 CAPSULE ORAL 2 TIMES DAILY WITH MEALS
Qty: 180 CAPSULE | Refills: 3 | Status: SHIPPED | OUTPATIENT
Start: 2024-01-23 | End: 2025-01-17

## 2024-01-23 NOTE — PROGRESS NOTES
NEPHROLOGY OFFICE VISIT   Ragini Melendez 68 y.o. female MRN: [id number]  1/23/2024    Reason for Visit: Nephrotic range proteinuria    ASSESSMENT and PLAN:  It was a pleasure evaluating your patient in the office today. Thank you for allowing our team to participate in the care of Ms. Ragini Melendez. Please do not hesitate to contact our team if further issues/questions shall arise in the interim.     # Nonnephrotic range proteinuria, previously nephrotic range proteinuria  >>Workup   - Urine albumin to creatinine ratio 150 mg/g in 2017  - Urine protein to creatinine ratio almost 1 g in 12/2020 before initiating Avastin  - Urine protein to creatinine ratio started rising in 06/2022 and subsequently Avastin was held after last dose on 09/09/2022  - Urine protein to creatinine ratio peaked at 17 g in 04/2023  - Urine protein to creatinine ratio currently improved to 1.92 g 11/2023  - Serum albumin improving, most recently 4.2 01/2024  - No clinical evidence of nephrotic syndrome  - HbA1c 5.2, MARY ANNE 2 R negative   - CHERI 1:80, dsDNA negative, no hypocomplementemia (no clinical evidence of lupus)  - Hep B/hep C/HIV negative  - Rheumatoid factor negative, cryoglobulin not detected  - Immunofixation showed polyclonal peak in IgG and IgA, serum free light chain ratio 1.8  - Worsening of proteinuria was related to use of VEGF inhibitor (based on literature review proteinuria can continue for several months even after stopping the medication) but she can have baseline glomerular pathology as proteinuria dates back to 2017  - Differential diagnosis includes proteinuria secondary to VEGF inhibitor versus membranous nephropathy in setting of malignancy (however malignancy is in remission) versus adaptive FSGS versus minimal-change disease   - No plans for kidney biopsy at this time as both kidneys are compromised with bilateral nephrostomy tubes in place (especially right kidney is mildly atrophic as compared to left kidney)  -  She has not tolerated RAAS inhibition due to low blood pressure   - Avastin has been on hold and there are no plans to restart the drug at this time  - She follows with hematology regarding biclonal gammopathy and no plans noted for bone marrow biopsy given normal total protein, albumin and calcium    # Recurrent endometrial cancer  - Diagnosis was made in 2017 and she underwent total abdominal hysterectomy and bilateral salpingo oophorectomy   - Status post ENDOBARR (rucaparib, atezolizumab, and bevacizumab) clinical trial started in 12/2020 and finished 12/2022  - Notes reviewed from gynecologic oncology (no evidence of disease) and plans noted for continued surveillance every 6 months or sooner with symptoms     # Chronic Kidney Disease Stage IV  - Etiology/risk factors: Hypertension, obstructive uropathy in setting of bilateral ureteral strictures, underlying glomerular pathology, age-related nephron loss   - Baseline Cr: 0.9-1.1 until 2022, then had several episodes of MONROE due to obstructive uropathy in setting of bilateral distal ureteral strictures and currently in the range of 2.2-2.5, most recently 2.36 01/2024  - Discussed natural history of progression of chronic kidney disease  - CKD modality education completed, patient is interested in peritoneal dialysis  - Once GFR persistently less than 15, we will make referral to see general surgery for candidacy of peritoneal dialysis catheter placement (this may be challenging due to previous history of abdominal surgery and radiation therapy which can increase the risk of adhesions and can make peritoneal dialysis difficult to perform).  - We will also make a referral to renal transplant as GFR is now persistently around 20 mL/min     # Bilateral ureteral strictures  - Notes reviewed from urology and etiology thought to be due to radiation therapy for endometrial cancer  - She currently has bilateral nephrostomy tubes and plans noted for evaluation for ileal  conduit formation  - She is currently urinating via nephrostomy tubes and the natural way  - We will reach out to urology to get their input regarding implication of creation of ileal conduit and future consideration of kidney transplant     # BP/Volume   - Goal BP <120/80 per KDIGO guidelines   - Volume status: Euvolemic  - Status: Blood pressure currently at goal  - Current antihypertensive regimen: She was previously tried on lisinopril to retard progression of proteinuria but this was stopped due to low blood pressure  - Continue to observe off antihypertensive medications  - She also has a prescription to take furosemide 20 mg as needed but has not required this in several months     # Anemia in CKD  - Target Hb: More than 10 g/dL  - Most recent hemoglobin: 9.8 g/dL  - Status post IV iron supplementation with oncology  - Most recent ferritin 567, iron saturation 14 08/2023  - No KENNEY given malignancy  - Defer management of anemia to hematology, she has an upcoming appointment in 02/2024     # Electrolytes/Acid Base status   >>History of metabolic acidosis  - Goal serum bicarbonate level 24-26  - Most recent serum bicarbonate level 23 01/2024  - Continue sodium bicarbonate 650 mg 3 times daily     # CKD Mineral and Bone Disorder   - Goal Ca 8.5-10 mg/dL, goal Phos 2.7-4.6 mg/dL, goal iPTH 30-70 pg/mL  - Secondary hyperparathyroidism most likely in setting of chronic kidney disease and previous vitamin D insufficiency  -  11/2023 increased from 246 and 07/2023  - 25 hydroxy vitamin D level 48.2 01/2024 on current dose of cholecalciferol  - Serum phosphorus level 5.1 despite low phosphorus diet  - Start PhosLo 1 tablet twice daily with meals  - We would typically treat with calcitriol when PTH is more than 150 but given hyperphosphatemia, holding off on initiation of activated vitamin D    # Other issues   - History of DVT status posttreatment with Lovenox, currently off Lovenox  - History of gastric bypass  in 2001    # Follow-up  - Follow-up renal function panel in 1 month  - Follow-up renal function panel and urine studies in in 3 months in 6 months  - Follow-up office visit in 6 months    HPI:  Since last visit:  She continues to have urine output through her nephrostomy tubes.  She is also urinating the natural way.  She denies dyspnea.  She denies leg swelling.    Ragini Melendez is a 68 y.o. female who has history of endometrial cancer status post total abdominal hysterectomy and bilateral salpingo-oophorectomy, status post chemotherapy with Taxol and carboplatin in 2019, currently onrucaparib, atezolizumab, and bevacizumab.  Bevacizumab was held on last 2 cycles due to worsening proteinuria.  She has history of obesity for which he underwent gastric bypass surgery in 2001.  She gained weight and was requiring metformin for prediabetes and quinapril for hypertension before she was diagnosed with endometrial cancer.  Since diagnosis of endometrial cancer she has lost significant amount of weight; anti diabetic and antihypertensive medications were stopped.       >> Major risk factors for CKD  - Diabetes: Previously pre-diabetic on Metformin  - Hypertension: Previous history of hypertension but not on any medications   - Age ? 55 years: Yes   - Family history of kidney disease: Father had kidney stones, his cousin was on dialysis   - Obesity or metabolic syndrome: Yes      >> Medical history evaluation   - Prior kidney disease or dialysis: No  - Incidental hematuria in the past: Yes with ureteral stent in place   - Urinary symptoms: Urinary incontinence   - History of foamy or frothy urine: No   - History of nephrolithiasis: Yes but never passed a stone   - Diseases that share risk factors with CKD: DM, HTN  - Systemic diseases that might affect kidney: No   - History of use of medications that might affect renal function: No    PATIENT INSTRUCTIONS:    Patient Instructions   Your kidney numbers are currently at  "stage IV kidney disease.  We will continue to keep a close eye on your kidney function.  We will check blood work in 1 month, then blood work and urine test in 3 months and then blood work and urine test in 6 months.  We will give you a referral to see kidney transplant doctor for further evaluation for kidney transplant.  For your high phosphorus, we will start you on a medication to decrease your phosphorus level.  This should be taken with food at the time of breakfast and dinner.  We will bring you back to nephrology office in 6 months.  Please reach out to the office if you have any concerns before that time.    OBJECTIVE:  Current Weight: Weight - Scale: 66.2 kg (146 lb)  Vitals:    01/23/24 0849   BP: 118/60   BP Location: Left arm   Patient Position: Sitting   Cuff Size: Standard   Pulse: 64   Weight: 66.2 kg (146 lb)   Height: 4' 9\" (1.448 m)      Body mass index is 31.59 kg/m².      REVIEW OF SYSTEMS:    Review of Systems   Constitutional:  Negative for chills and fever.   HENT:  Negative for ear pain and sore throat.    Eyes:  Negative for pain and visual disturbance.   Respiratory:  Negative for cough and shortness of breath.    Cardiovascular:  Negative for chest pain and palpitations.   Gastrointestinal:  Negative for abdominal pain and vomiting.   Genitourinary:  Negative for dysuria and hematuria.   Musculoskeletal:  Negative for arthralgias and back pain.   Skin:  Negative for color change and rash.   Neurological:  Negative for seizures and syncope.   All other systems reviewed and are negative.      Past Medical History:   Diagnosis Date    Anemia     Anxiety     Cancer ([facility])     ENDOMETRIAL    Chemotherapy induced neutropenia  8/30/2019    CKD (chronic kidney disease) stage 3, GFR 30-59 ml/min ([facility])     COVID     Fall 2022    Depression     DVT (deep venous thrombosis) ([facility]) 07/19/2019    RIGHT LEG    Endometrial cancer ([facility]) 12/2017    GERD (gastroesophageal reflux disease)     H/O gastric " bypass     Hard to intubate     pt denies AS OF 7/25/23    History of chemotherapy     started 12/2021endometrial cancer- done 12/23/22    History of DVT in adulthood     RLE    History of transfusion 04/13/2023    Hyperglycemia     vx type 2 dm -- last assessed 4/1/14; resolved 11/7/17    Hyperlipidemia     Hypertension     in past- no meds at present    Iron deficiency anemia     iron infusions weekly    OAB (overactive bladder)     Port-A-Cath in place     Wears glasses        Past Surgical History:   Procedure Laterality Date    ABDOMINAL SURGERY      GASTRIC BYPASS; 2001    BREAST BIOPSY Right 06/28/2019    core biopsy; benign    CHOLECYSTECTOMY      at the time of gastric bypass    COLONOSCOPY      CT NEEDLE BIOPSY LYMPH NODE  07/08/2019    FL GUIDED CENTRAL VENOUS ACCESS DEVICE INSERTION  11/12/2019    FL RETROGRADE PYELOGRAM  03/30/2023    FL RETROGRADE PYELOGRAM  05/09/2023    FL RETROGRADE PYELOGRAM  8/1/2023    GASTRIC BYPASS      HYSTERECTOMY Bilateral 2017    total abdominal with salpingo-oophorectomy    IR NEPHROSTOMY TO NEPHROURETERAL STENT  06/09/2022    IR NEPHROSTOMY TO NEPHROURETERAL STENT  12/20/2022    IR NEPHROSTOMY TO NEPHROURETERAL STENT  8/8/2023    IR NEPHROSTOMY TUBE CHECK/CHANGE/REPOSITION/REINSERTION/UPSIZE  05/27/2022    IR NEPHROSTOMY TUBE CHECK/CHANGE/REPOSITION/REINSERTION/UPSIZE  11/10/2023    IR NEPHROSTOMY TUBE PLACEMENT  07/26/2019    IR NEPHROSTOMY TUBE PLACEMENT  05/27/2023    IR NEPHROURETERAL STENT CHECK/CHANGE/REPOSITION  04/06/2021    IR NEPHROURETERAL STENT CHECK/CHANGE/REPOSITION  06/04/2021    IR NEPHROURETERAL STENT CHECK/CHANGE/REPOSITION  09/03/2021    IR NEPHROURETERAL STENT CHECK/CHANGE/REPOSITION  12/07/2021    IR NEPHROURETERAL STENT CHECK/CHANGE/REPOSITION  03/08/2022    IR NEPHROURETERAL STENT CHECK/CHANGE/REPOSITION  05/10/2022    IR NEPHROURETERAL STENT CHECK/CHANGE/REPOSITION  09/19/2022    IR PICC LINE  09/27/2019    IR PORT PLACEMENT  07/26/2019    IR PORT  PLACEMENT      IR PORT REMOVAL  09/20/2019    OOPHORECTOMY Bilateral 2017    ID CYSTO BLADDER W/URETERAL CATHETERIZATION Right 03/30/2023    Procedure: CYSTOSCOPY RETROGRADE PYELOGRAM WITH exchange of STENT URETERAL;  Surgeon: Demetrius Lowe MD;  [facility]: [facility];  Service: Urology    ID CYSTO BLADDER W/URETERAL CATHETERIZATION Bilateral 05/09/2023    Procedure: CYSTOSCOPY, BILATERAL RETROGRADE PYELOGRAM, WITH LEFT INSERTION STENT URETERAL, RIGHT URTERAL STENT EXCHANGE;  Surgeon: Nicola Hannah MD;  [facility];  Service: Urology    ID CYSTO BLADDER W/URETERAL CATHETERIZATION Bilateral 8/1/2023    Procedure: CYSTOSCOPY BILATERAL RETROGRADE  WITH REMOVAL BILATERAL  STENT URETERAL;  Surgeon: Demetrius Lowe MD;  [facility];  Service: Urology    ID CYSTO W/INSERT URETERAL STENT Right 03/30/2023    Procedure: EXCHANGE STENT URETERAL;  Surgeon: Demetrius Lowe MD;  [facility];  Service: Urology    ID INSJ TUNNELED CTR VAD W/SUBQ PORT AGE 7 YR/> Left 11/12/2019    Procedure: INSERTION VENOUS PORT ( PORT-A-CATH) IR;  Surgeon: Edilberto Guzman DO;  [facility];  Service: Interventional Radiology    ID LAPS ABD PRTM&OMENTUM DX W/WO SPEC BR/WA SPX N/A 12/19/2017    Procedure: LAPAROSCOPY DIAGNOSTIC;  Surgeon: Evgeny So MD;  [facility];  Service: Gynecology Oncology    ID LAPS W/RAD HYST W/BILAT LMPHADEC RMVL TUBE/OVARY N/A 12/19/2017    Procedure: HYSTERECTOMY LAPAROSCOPIC TOTAL (LTH) W/ ROBOTICS; BILATERAL SALPINGOOPHERECTOMY; LYMPH NODE DISSECTION; lysis of adhesions;  Surgeon: Evgeny So MD;  [facility];  Service: Gynecology Oncology    REMOVAL URETERAL STENT Bilateral 8/1/2023    Procedure: REMOVAL STENT URETERAL;  Surgeon: Demetrius Lowe MD;  [facility];  Service: Urology    TONSILLECTOMY      US GUIDED BREAST BIOPSY RIGHT COMPLETE Right 06/28/2019       Family History   Problem Relation Age of Onset     Hyperlipidemia Mother     Heart disease Mother     Ovarian cancer Mother 50    Colon cancer Mother     Lymphoma Father     Breast cancer Sister 63    No Known Problems Brother     No Known Problems Brother     No Known Problems Maternal Grandmother     Bone cancer Maternal Grandfather     Uterine cancer Paternal Grandmother     No Known Problems Paternal Grandfather     No Known Problems Maternal Aunt     No Known Problems Maternal Aunt     No Known Problems Maternal Aunt     No Known Problems Maternal Aunt     No Known Problems Paternal Aunt     No Known Problems Paternal Aunt     No Known Problems Paternal Aunt     No Known Problems Paternal Aunt         Social History     Substance and Sexual Activity   Alcohol Use Never     Social History     Substance and Sexual Activity   Drug Use Never     Social History     Tobacco Use   Smoking Status Never   Smokeless Tobacco Never       PHYSICAL EXAM:      Physical Exam  Constitutional:       Appearance: Normal appearance.   HENT:      Head: Normocephalic and atraumatic.      Mouth/Throat:      Mouth: Mucous membranes are moist.      Pharynx: Oropharynx is clear.   Cardiovascular:      Rate and Rhythm: Normal rate and regular rhythm.      Pulses: Normal pulses.      Heart sounds: Normal heart sounds.   Pulmonary:      Effort: Pulmonary effort is normal.      Breath sounds: Normal breath sounds.   Abdominal:      General: Bowel sounds are normal.      Palpations: Abdomen is soft.   Musculoskeletal:         General: Normal range of motion.      Right lower leg: No edema.      Left lower leg: No edema.   Skin:     General: Skin is warm and dry.   Neurological:      General: No focal deficit present.      Mental Status: She is alert and oriented to person, place, and time. Mental status is at baseline.   Psychiatric:         Mood and Affect: Mood normal.         Behavior: Behavior normal.         Thought Content: Thought content normal.         Judgment: Judgment normal.          Medications:    Current Outpatient Medications:     ARIPiprazole (ABILIFY) 20 MG tablet, Take 20 mg by mouth every morning, Disp: , Rfl:     calcium acetate (PHOSLO) capsule, Take 1 capsule (667 mg total) by mouth 2 (two) times a day with meals, Disp: 180 capsule, Rfl: 3    cholecalciferol (VITAMIN D3) 1,000 units tablet, Take 4 tablets (4,000 Units total) by mouth daily (Patient taking differently: Take 4,000 Units by mouth daily at bedtime), Disp: 90 tablet, Rfl: 0    Cyanocobalamin (VITAMIN B12 PO), Take 1 capsule by mouth daily after lunch, Disp: , Rfl:     dronabinol (MARINOL) 2.5 mg capsule, Take 1 capsule (2.5 mg total) by mouth 2 (two) times a day before meals, Disp: 30 capsule, Rfl: 0    folic acid (FOLVITE) 1 mg tablet, Take 1 tablet (1 mg total) by mouth daily (Patient taking differently: Take 1 mg by mouth daily at bedtime), Disp: , Rfl: 0    furosemide (LASIX) 20 mg tablet, TAKE 1 TABLET (20 MG TOTAL) BY MOUTH IF NEEDED (FOR INCREASED LEG SWELLING OR WEIGHT GAIN >2 LBS IN 1 DAY), Disp: 90 tablet, Rfl: 1    Multiple Vitamin (MULTIVITAMIN) tablet, Take 1 tablet by mouth every morning, Disp: , Rfl:     oxybutynin (DITROPAN XL) 15 MG 24 hr tablet, Take 1 tablet (15 mg total) by mouth daily at bedtime, Disp: 90 tablet, Rfl: 1    saccharomyces boulardii (FLORASTOR) 250 mg capsule, Take 1 capsule (250 mg total) by mouth daily after lunch, Disp: , Rfl:     sodium bicarbonate 650 mg tablet, Take 1 tablet (650 mg total) by mouth 3 (three) times a day, Disp: 270 tablet, Rfl: 3    venlafaxine (EFFEXOR-XR) 75 mg 24 hr capsule, TAKE 3 CAPS DAILY, Disp: , Rfl:     Vibegron (Gemtesa) 75 MG TABS, Take 1 capsule by mouth every morning, Disp: 90 tablet, Rfl: 3    BD PosiFlush 0.9 % SOLN, , Disp: , Rfl:     Calcium-Magnesium-Vitamin D (CALCIUM 500 PO), Take 1 capsule by mouth daily at bedtime, Disp: , Rfl:     omeprazole (PriLOSEC) 20 mg delayed release capsule, Take 20 mg by mouth every morning (Patient not  taking: Reported on 12/29/2023), Disp: , Rfl:     polyethylene glycol-electrolytes (TriLyte) 4000 mL solution, Take 4,000 mL by mouth once for 1 dose Take 4000 mL by mouth once for 1 dose. Use as directed (Patient not taking: Reported on 11/8/2023), Disp: 4000 mL, Rfl: 0    senna (SENOKOT) 8.6 MG tablet, Take 1 tablet (8.6 mg total) by mouth 2 (two) times a day as needed for constipation (Patient not taking: Reported on 11/8/2023), Disp: 60 tablet, Rfl: 0    sodium chloride, PF, 0.9 %, 10 mL by Intracatheter route daily Intracatheter flushing daily. May substitute prefilled syringe with normal saline 10 mL vials, 10 mL syringes, and 18 g blunt needles, Disp: 300 mL, Rfl: 2    sodium chloride, PF, 0.9 %, 10 mL by Intracatheter route daily for 120 doses Intracatheter flushing daily. May substitute prefilled syringe with normal saline 10 mL vials, 10 mL syringes, and 18 g blunt needles, Disp: 300 mL, Rfl: 3    sodium chloride, PF, 0.9 %, 10 mL by Intracatheter route daily for 120 doses Intracatheter flushing daily. May substitute prefilled syringe with normal saline 10 mL vials, 10 mL syringes, and 18 g blunt needles (Patient not taking: Reported on 12/29/2023), Disp: 300 mL, Rfl: 3    Laboratory Results:  Results from last 7 days   Lab Units 01/20/24  0853   POTASSIUM mmol/L 4.3   CHLORIDE mmol/L 104   CO2 mmol/L 23   BUN mg/dL 66*   CREATININE mg/dL 2.36*   CALCIUM mg/dL 8.9   PHOSPHORUS mg/dL 5.1*       Results for orders placed or performed in visit on 01/20/24   Renal function panel   Result Value Ref Range    Albumin 4.2 3.5 - 5.0 g/dL    Calcium 8.9 8.4 - 10.2 mg/dL    Phosphorus 5.1 (H) 2.3 - 4.1 mg/dL    Glucose 89 65 - 140 mg/dL    BUN 66 (H) 5 - 25 mg/dL    Creatinine 2.36 (H) 0.60 - 1.30 mg/dL    Sodium 136 135 - 147 mmol/L    Potassium 4.3 3.5 - 5.3 mmol/L    Chloride 104 96 - 108 mmol/L    CO2 23 21 - 32 mmol/L    ANION GAP 9 mmol/L    eGFR 20 ml/min/1.73sq m   Vitamin D 25 hydroxy   Result Value Ref  Range    Vit D, 25-Hydroxy 48.2 30.0 - 100.0 ng/mL     *Note: Due to a large number of results and/or encounters for the requested time period, some results have not been displayed. A complete set of results can be found in Results Review.

## 2024-01-23 NOTE — PATIENT INSTRUCTIONS
Your kidney numbers are currently at stage IV kidney disease.  We will continue to keep a close eye on your kidney function.  We will check blood work in 1 month, then blood work and urine test in 3 months and then blood work and urine test in 6 months.  We will give you a referral to see kidney transplant doctor for further evaluation for kidney transplant.  For your high phosphorus, we will start you on a medication to decrease your phosphorus level.  This should be taken with food at the time of breakfast and dinner.  We will bring you back to nephrology office in 6 months.  Please reach out to the office if you have any concerns before that time.

## 2024-01-29 ENCOUNTER — OFFICE VISIT (OUTPATIENT)
Dept: BARIATRICS | Facility: CLINIC | Age: 69
End: 2024-01-29

## 2024-01-29 VITALS
BODY MASS INDEX: 29.13 KG/M2 | DIASTOLIC BLOOD PRESSURE: 74 MMHG | HEART RATE: 53 BPM | WEIGHT: 144.5 LBS | OXYGEN SATURATION: 99 % | SYSTOLIC BLOOD PRESSURE: 100 MMHG | HEIGHT: 59 IN | TEMPERATURE: 97.6 F

## 2024-01-29 DIAGNOSIS — C54.1 ENDOMETRIAL CANCER (HCC): Chronic | ICD-10-CM

## 2024-01-29 DIAGNOSIS — K91.2 POSTSURGICAL MALABSORPTION: ICD-10-CM

## 2024-01-29 DIAGNOSIS — D50.8 IRON DEFICIENCY ANEMIA SECONDARY TO INADEQUATE DIETARY IRON INTAKE: ICD-10-CM

## 2024-01-29 DIAGNOSIS — E66.3 OVERWEIGHT: ICD-10-CM

## 2024-01-29 DIAGNOSIS — Z98.84 BARIATRIC SURGERY STATUS: ICD-10-CM

## 2024-01-29 DIAGNOSIS — N18.4 STAGE 4 CHRONIC KIDNEY DISEASE (HCC): ICD-10-CM

## 2024-01-29 DIAGNOSIS — Z93.6 NEPHROSTOMY STATUS (HCC): ICD-10-CM

## 2024-01-29 DIAGNOSIS — Z48.815 ENCOUNTER FOR SURGICAL AFTERCARE FOLLOWING SURGERY OF DIGESTIVE SYSTEM: Primary | ICD-10-CM

## 2024-01-29 DIAGNOSIS — G89.4 CHRONIC PAIN SYNDROME: ICD-10-CM

## 2024-01-29 RX ORDER — SODIUM CHLORIDE 9 MG/ML
30 INJECTION, SOLUTION INTRAVENOUS CONTINUOUS
Status: CANCELLED | OUTPATIENT
Start: 2024-01-29

## 2024-01-29 NOTE — PROGRESS NOTES
Assessment/Plan:     Patient ID: Ragini Melendez is a 68 y.o. female.     Bariatric Surgery Status    s/p Edgardo-En-Y Gastric Bypass with Dr. Mcmahon on in 2001   Patient with weight loss over the past 2 years due to decrease appetite; she has recurrent stage IB endometrial cancer. Patient states as of last year she is cancer free however now as kidney failure due to the radiation. She has b/l nephrostomy tubes and is a candidate for renal transplant.     She is not currently in weight watchers to help lose the weight she gained after stopping chemo. She states this is helping her lost weight. She is not exercising but would like to increase this slowly as tolerated.     Continued/Maintain healthy weight loss with good nutrition intakes.  Adequate hydration with at least 64oz. fluid intake.  Follow diet as discussed.  Follow vitamin and mineral recommendations as reviewed with you.  Exercise as tolerated.    Colonoscopy referral made: utd; she completed EGD at the same time which revealed unremarkable bypass anatomy   Mammo: utd     Follow-up in 1 year. We kindly ask that your arrive 15 minutes before your scheduled appointment time with your provider to allow our staff to room you, get your vital signs and update your chart.    Get lab work done . Please call the office if you need a script.  It is recommended to check with your insurance BEFORE getting labs done to make sure they are covered by your policy.      Call our office if you have any problems with abdominal pain especially associated with fever, chills, nausea, vomiting or any other concerns.    All  Post-bariatric surgery patients should be aware that very small quantities of any alcohol can cause impairment and it is very possible not to feel the effect. The effect can be in the system for several hours.  It is also a stomach irritant.     It is advised to AVOID alcohol, Nonsteroidal antiinflammatory drugs (NSAIDS) and nicotine of all forms . Any of  these can cause stomach irritation/pain.    Discussed the effects of alcohol on a bariatric patient and the increased impairment risk.     Keep up the good work!     Postsurgical Malabsorption   -At risk for malabsorption of vitamins/minerals secondary to malabsorption and restriction of intake from bariatric surgery  -Currently taking adequate postop bariatric surgery vitamin supplementation  -PTH and vit D monitored by endo   - iron labs managed by heme/onc  -Patient received education about the importance of adhering to a lifelong supplementation regimen to avoid vitamin/mineral deficiencies      Diagnoses and all orders for this visit:    Encounter for surgical aftercare following surgery of digestive system  -     Zinc; Future  -     Vitamin B1, whole blood; Future  -     Vitamin A; Future    Bariatric surgery status  -     Zinc; Future  -     Vitamin B1, whole blood; Future  -     Vitamin A; Future    Postsurgical malabsorption  -     Zinc; Future  -     Vitamin B1, whole blood; Future  -     Vitamin A; Future    Overweight  -     Zinc; Future  -     Vitamin B1, whole blood; Future  -     Vitamin A; Future    Iron deficiency anemia secondary to inadequate dietary iron intake    Chronic pain syndrome    Nephrostomy status (HCC)    Stage 4 chronic kidney disease (HCC)    Endometrial cancer (HCC)         Subjective:      Patient ID: Ragini Melendez is a 68 y.o. female.    s/p Edgardo-En-Y Gastric Bypass with Dr. Mcmahon on in 2001  Tolerating diet without issues; denies N/V, dysphagia, reflux.     Current: 144  EWL: (Weight loss is ahead of schedule at this post surgical period.)  Current BMI is Body mass index is 29.69 kg/m².    Tolerating a regular diet-yes  Eating at least 60 grams of protein per day-yes  Drinking at least 64 ounces of fluid per day-yes  Drinking carbonated beverages-no  Sufficient exercise-no  Using NSAIDs regularly-no  Using nicotine-no  Using alcohol-no  Supplements:  b12 + d3 + folic acid  "+ MVI + iron     EWL is 81%, which places the patient ahead of schedule for expected post surgical weight loss at this time.     The following portions of the patient's history were reviewed and updated as appropriate: allergies, current medications, past family history, past medical history, past social history, past surgical history and problem list.    Review of Systems   Constitutional: Negative.    Respiratory: Negative.     Cardiovascular: Negative.    Gastrointestinal: Negative.    Neurological: Negative.    Psychiatric/Behavioral: Negative.           Objective:    /74 (BP Location: Left arm, Patient Position: Sitting, Cuff Size: Large)   Pulse (!) 53   Temp 97.6 °F (36.4 °C) (Tympanic)   Ht 4' 10.5\" (1.486 m)   Wt 65.5 kg (144 lb 8 oz)   LMP  (LMP Unknown)   SpO2 99%   BMI 29.69 kg/m²      Physical Exam  Vitals and nursing note reviewed.   Constitutional:       Appearance: Normal appearance.   HENT:      Head: Normocephalic and atraumatic.   Eyes:      Extraocular Movements: Extraocular movements intact.      Pupils: Pupils are equal, round, and reactive to light.   Cardiovascular:      Rate and Rhythm: Normal rate.   Pulmonary:      Effort: Pulmonary effort is normal.   Musculoskeletal:         General: Normal range of motion.      Cervical back: Normal range of motion.   Skin:     General: Skin is warm and dry.   Neurological:      General: No focal deficit present.      Mental Status: She is alert and oriented to person, place, and time.   Psychiatric:         Mood and Affect: Mood normal.             "

## 2024-01-29 NOTE — PATIENT INSTRUCTIONS
Follow-up in 1 year. We kindly ask that your arrive 15 minutes before your scheduled appointment time with your provider to allow our staff to room you, get your vital signs and update your chart.    Get lab work done . Please call the office if you need a script.  It is recommended to check with your insurance BEFORE getting labs done to make sure they are covered by your policy.      Call our office if you have any problems with abdominal pain especially associated with fever, chills, nausea, vomiting or any other concerns.    All  Post-bariatric surgery patients should be aware that very small quantities of any alcohol can cause impairment and it is very possible not to feel the effect. The effect can be in the system for several hours.  It is also a stomach irritant.     It is advised to AVOID alcohol, Nonsteroidal antiinflammatory drugs (NSAIDS) and nicotine of all forms . Any of these can cause stomach irritation/pain.    Discussed the effects of alcohol on a bariatric patient and the increased impairment risk.     Keep up the good work!   
Airway patent, Nasal mucosa clear. Mouth with normal mucosa. Throat has no vesicles, no oropharyngeal exudates and uvula is midline.

## 2024-01-31 ENCOUNTER — TELEPHONE (OUTPATIENT)
Dept: RADIOLOGY | Facility: HOSPITAL | Age: 69
End: 2024-01-31

## 2024-01-31 NOTE — PRE-PROCEDURE INSTRUCTIONS
Spoke to patient regarding IR PCN exchange scheduled on 2/9 at 1030. Patient given arrival time of 0930 to [facility]. Instructed to be NPO after midnight the night before, to have a , and to take AM medications with a small sip of water. Patient stated understanding.

## 2024-02-05 ENCOUNTER — TELEPHONE (OUTPATIENT)
Dept: UROLOGY | Facility: AMBULATORY SURGERY CENTER | Age: 69
End: 2024-02-05

## 2024-02-05 NOTE — TELEPHONE ENCOUNTER
"----- Message from Beth Alva RN sent at 2/5/2024 12:49 PM EST -----  Regarding: FW: [place] patient  Holding 3/22 appt for discussion. Please call patient and see if she is interested in coming in to discuss a possible surgical intervention again.   ----- Message -----  From: Demetrius Lowe MD  Sent: 2/1/2024  10:50 AM EST  To: Beth Alva RN; Beth Rodriguez MD; #  Subject: RE: [place] patient                               Thanks for all of the messaging.  When I look in future epic encounters, she does not have an appt with me.  Beth HOANG RN will help me to get her back in.  Thank you.    FT    ----- Message -----  From: Beth Rodriguez MD  Sent: 1/31/2024   8:57 AM EST  To: Demetrius Lowe MD; #  Subject: RE: [place] patient                               Agree. I think its worth a shot.     Demetrius, do you want to bring her in and discuss again? Let me know how I can assist   ----- Message -----  From: Demetrius Lowe MD  Sent: 1/31/2024   8:08 AM EST  To: Beth Rodriguez MD; Wade Villalobos MD  Subject: RE: [place] patient                               Thank you.  This is every helpful.  She is only 68 and even if she has ESRD and needs RRT, she still may produce urine.  I think its reasonable that we offer her an attempt at an ileal conduit.  I think the real question is how technical and challenging the case may/will be.  We wont know until we open her.  Thanks again.    Demetrius     ----- Message -----  From: Wade Villalobos MD  Sent: 1/30/2024   3:07 PM EST  To: Demetrius Lowe MD; Beth Rodriguez MD  Subject: RE: [place] patient                               What we use in our office to predict chances of kidney failure is \"kidney failure risk equation\".  When I plug in all her numbers her 5-year risk for kidney failure needing dialysis to 70%.  Again it is hard to predict her timing of ESRD as she may have a lower GFR for years before she starts developing symptoms that would make " "her start dialysis.  I would still say that she may have more than a year at this stage before she would need dialysis.  She feels really well clinically and I do not think that she would need dialysis this year.  ----- Message -----  From: Demetrius Lowe MD  Sent: 1/28/2024   8:37 PM EST  To: Beth Rodriguez MD; Wade Villalobos MD  Subject: RE: [place] patient                               She can have an ileal conduit (even one that has some urine from the kidneys) as well as a transplant to the native bladder.  I cannot remember the last time I have seen that, but it can exist.  I think we need to rely on Wade to \"guesstimate\" her timing of ESRD with the need for RRT and the likelihood of her becoming anuric.      Thanks everyone.    FT   ----- Message -----  From: Beth Rodriguez MD  Sent: 1/28/2024   6:22 PM EST  To: Demetrius Lowe MD; #  Subject: RE: [place] patient                               Yes that's my thought as well, however, if its less exenterative and we just leave behind the distal ureter/bladder it may be less invasive. The reversal of that however if she undergoes transplant could be difficult, don't you think? I would have to discuss with a transplant surgeon to get their take.     Ill follow your lead Demetrius. I know she hates the PCNs and that it could be quite some time before a transplant can occur.       ----- Message -----  From: Demetrius Lowe MD  Sent: 1/23/2024   5:15 PM EST  To: Beth Rodriguez MD; Wade Villalobos MD  Subject: RE: [place] patient                               This is tough call as she is on the cusp of HD.  The ileal conduit would be for distal ureteral strictures.  We would leave her bladder in place so that if she gets a transplant they can implant into the bladder.  Ileal conduit is a big  operation for someone who may on HD and ultimately may be anuric.  Beth any thoughts?    FT    ----- Message -----  From: Wade Villalobos MD  Sent: " 1/23/2024   9:22 AM EST  To: Demetrius Lowe MD; Beth Rodriguez MD  Subject: [employer] patient                                   [place] Dr. Lowe, reaching out to you regarding mutual patient.  From a nephrology perspective, it appears that she now has stage IV chronic kidney disease with GFR around 20.  She has no indications for dialysis at this stage but with a GFR of around 20, she does qualify for evaluation for kidney transplantation.  She is interested in evaluation for kidney transplantation and I have provided her with a referral to see kidney transplant specialist in our practice.  But again this is a thing for future and she may not be able to get this done very quickly as she would have to undergo extensive evaluation and then go on a list without any certainty about the timing.  I have reviewed your notes from before and I was just wondering if she would still be a candidate for creation of an ileal conduit.  And if having an ileal conduit would put any restrictions for her kidney transplant for future.  I am also copying Dr. Rodriguez who follows the patient from gynecologic oncology.  Let me know of your thoughts.  Thank you.

## 2024-02-07 ENCOUNTER — TELEPHONE (OUTPATIENT)
Dept: UROLOGY | Facility: AMBULATORY SURGERY CENTER | Age: 69
End: 2024-02-07

## 2024-02-07 NOTE — TELEPHONE ENCOUNTER
Called pt and lvm telling there is appt held for her on 3/22 to have a discussion with Dr Lowe and we would like her to call back and let us know if she wants to keep this appt or not.

## 2024-02-07 NOTE — TELEPHONE ENCOUNTER
"----- Message from Beth Alva RN sent at 2/5/2024 12:49 PM EST -----  Regarding: FW: [place] patient  Holding 3/22 appt for discussion. Please call patient and see if she is interested in coming in to discuss a possible surgical intervention again.   ----- Message -----  From: Demetrius Lowe MD  Sent: 2/1/2024  10:50 AM EST  To: Beth Alva RN; Beth Rodriguez MD; #  Subject: RE: [place] patient                               Thanks for all of the messaging.  When I look in future epic encounters, she does not have an appt with me.  Beth HOANG RN will help me to get her back in.  Thank you.    FT    ----- Message -----  From: Beth Rodriguez MD  Sent: 1/31/2024   8:57 AM EST  To: Demetrius Lowe MD; #  Subject: RE: [place] patient                               Agree. I think its worth a shot.     Demetrius, do you want to bring her in and discuss again? Let me know how I can assist   ----- Message -----  From: Demetrius Lowe MD  Sent: 1/31/2024   8:08 AM EST  To: Beth Rodriguez MD; Wade Villalobos MD  Subject: RE: [place] patient                               Thank you.  This is every helpful.  She is only 68 and even if she has ESRD and needs RRT, she still may produce urine.  I think its reasonable that we offer her an attempt at an ileal conduit.  I think the real question is how technical and challenging the case may/will be.  We wont know until we open her.  Thanks again.    Demetrius     ----- Message -----  From: Wade Villalobos MD  Sent: 1/30/2024   3:07 PM EST  To: Demetrius Lowe MD; Beth Rodriguez MD  Subject: RE: [place] patient                               What we use in our office to predict chances of kidney failure is \"kidney failure risk equation\".  When I plug in all her numbers her 5-year risk for kidney failure needing dialysis to 70%.  Again it is hard to predict her timing of ESRD as she may have a lower GFR for years before she starts developing symptoms that would make " "her start dialysis.  I would still say that she may have more than a year at this stage before she would need dialysis.  She feels really well clinically and I do not think that she would need dialysis this year.  ----- Message -----  From: Demetrius Lowe MD  Sent: 1/28/2024   8:37 PM EST  To: Beth Rodriguez MD; Wade Villalobos MD  Subject: RE: [place] patient                               She can have an ileal conduit (even one that has some urine from the kidneys) as well as a transplant to the native bladder.  I cannot remember the last time I have seen that, but it can exist.  I think we need to rely on Wade to \"guesstimate\" her timing of ESRD with the need for RRT and the likelihood of her becoming anuric.      Thanks everyone.    FT   ----- Message -----  From: Beth Rodriguez MD  Sent: 1/28/2024   6:22 PM EST  To: Demetrius Lowe MD; #  Subject: RE: [place] patient                               Yes that's my thought as well, however, if its less exenterative and we just leave behind the distal ureter/bladder it may be less invasive. The reversal of that however if she undergoes transplant could be difficult, don't you think? I would have to discuss with a transplant surgeon to get their take.     Ill follow your lead Demetrius. I know she hates the PCNs and that it could be quite some time before a transplant can occur.       ----- Message -----  From: Demetrius Lowe MD  Sent: 1/23/2024   5:15 PM EST  To: Beth Rodriguez MD; Wade Villalobos MD  Subject: RE: [place] patient                               This is tough call as she is on the cusp of HD.  The ileal conduit would be for distal ureteral strictures.  We would leave her bladder in place so that if she gets a transplant they can implant into the bladder.  Ileal conduit is a big  operation for someone who may on HD and ultimately may be anuric.  Beth any thoughts?    FT    ----- Message -----  From: Wade Villalobos MD  Sent: " 1/23/2024   9:22 AM EST  To: Demetrius Lowe MD; Beth Rodriguez MD  Subject: [place] patient                                   [place] Dr. Lowe, reaching out to you regarding mutual patient.  From a nephrology perspective, it appears that she now has stage IV chronic kidney disease with GFR around 20.  She has no indications for dialysis at this stage but with a GFR of around 20, she does qualify for evaluation for kidney transplantation.  She is interested in evaluation for kidney transplantation and I have provided her with a referral to see kidney transplant specialist in our practice.  But again this is a thing for future and she may not be able to get this done very quickly as she would have to undergo extensive evaluation and then go on a list without any certainty about the timing.  I have reviewed your notes from before and I was just wondering if she would still be a candidate for creation of an ileal conduit.  And if having an ileal conduit would put any restrictions for her kidney transplant for future.  I am also copying Dr. Rodriguez who follows the patient from gynecologic oncology.  Let me know of your thoughts.  Thank you.

## 2024-02-09 ENCOUNTER — HOSPITAL ENCOUNTER (OUTPATIENT)
Dept: RADIOLOGY | Facility: HOSPITAL | Age: 69
Discharge: HOME | End: 2024-02-09
Attending: STUDENT IN AN ORGANIZED HEALTH CARE EDUCATION/TRAINING PROGRAM
Payer: COMMERCIAL

## 2024-02-09 VITALS
HEART RATE: 69 BPM | RESPIRATION RATE: 18 BRPM | TEMPERATURE: 97 F | DIASTOLIC BLOOD PRESSURE: 57 MMHG | WEIGHT: 140.6 LBS | OXYGEN SATURATION: 97 % | HEIGHT: 57 IN | SYSTOLIC BLOOD PRESSURE: 102 MMHG | BODY MASS INDEX: 30.34 KG/M2

## 2024-02-09 DIAGNOSIS — N13.5 URETERAL STRICTURE: ICD-10-CM

## 2024-02-09 PROCEDURE — C1769 GUIDE WIRE: HCPCS

## 2024-02-09 PROCEDURE — 99152 MOD SED SAME PHYS/QHP 5/>YRS: CPT

## 2024-02-09 PROCEDURE — 99152 MOD SED SAME PHYS/QHP 5/>YRS: CPT | Performed by: RADIOLOGY

## 2024-02-09 PROCEDURE — 50435 EXCHANGE NEPHROSTOMY CATH: CPT

## 2024-02-09 PROCEDURE — 50435 EXCHANGE NEPHROSTOMY CATH: CPT | Performed by: RADIOLOGY

## 2024-02-09 PROCEDURE — 99153 MOD SED SAME PHYS/QHP EA: CPT

## 2024-02-09 PROCEDURE — C1729 CATH, DRAINAGE: HCPCS

## 2024-02-09 RX ORDER — LIDOCAINE WITH 8.4% SOD BICARB 0.9%(10ML)
SYRINGE (ML) INJECTION AS NEEDED
Status: COMPLETED | OUTPATIENT
Start: 2024-02-09 | End: 2024-02-09

## 2024-02-09 RX ORDER — FENTANYL CITRATE 50 UG/ML
INJECTION, SOLUTION INTRAMUSCULAR; INTRAVENOUS AS NEEDED
Status: COMPLETED | OUTPATIENT
Start: 2024-02-09 | End: 2024-02-09

## 2024-02-09 RX ORDER — MIDAZOLAM HYDROCHLORIDE 2 MG/2ML
INJECTION, SOLUTION INTRAMUSCULAR; INTRAVENOUS AS NEEDED
Status: COMPLETED | OUTPATIENT
Start: 2024-02-09 | End: 2024-02-09

## 2024-02-09 RX ORDER — SODIUM CHLORIDE 9 MG/ML
30 INJECTION, SOLUTION INTRAVENOUS CONTINUOUS
Status: DISCONTINUED | OUTPATIENT
Start: 2024-02-09 | End: 2024-02-10 | Stop reason: HOSPADM

## 2024-02-09 RX ADMIN — Medication 3 ML: at 10:51

## 2024-02-09 RX ADMIN — MIDAZOLAM 1 MG: 1 INJECTION INTRAMUSCULAR; INTRAVENOUS at 10:54

## 2024-02-09 RX ADMIN — Medication 3 ML: at 11:01

## 2024-02-09 RX ADMIN — IOHEXOL 10 ML: 350 INJECTION, SOLUTION INTRAVENOUS at 11:08

## 2024-02-09 RX ADMIN — MIDAZOLAM 1 MG: 1 INJECTION INTRAMUSCULAR; INTRAVENOUS at 10:45

## 2024-02-09 RX ADMIN — FENTANYL CITRATE 50 MCG: 50 INJECTION INTRAMUSCULAR; INTRAVENOUS at 10:46

## 2024-02-09 RX ADMIN — FENTANYL CITRATE 25 MCG: 50 INJECTION INTRAMUSCULAR; INTRAVENOUS at 10:54

## 2024-02-09 NOTE — DISCHARGE INSTRUCTIONS
Nephrostomy Tube Care     WHAT YOU NEED TO KNOW:   A nephrostomy tube is a catheter (thin plastic tube) that is inserted through your skin and into your kidney. The nephrostomy tube drains urine from your kidney into a collecting bag outside your body. You may need a nephrostomy tube when something is blocking the normal flow of urine. A nephrostomy tube may be used for a short or a long period of time. The nephrostomy tube comes out of your back, so you will need someone to help care for your nephrostomy tube.        DISCHARGE INSTRUCTIONS:      How to clean the skin around the nephrostomy tube and change the bandage:  Since the nephrostomy tube comes out of your back, you will not be able to care for it by yourself. Ask someone to follow the general directions below to check and care for your nephrostomy tube.   Gather the items you will need.          Disposable (single use) under-pad, and a clean washcloth  Plain soap, warm water, and new medical gloves  Sterile gauze bandages  Clear adhesive dressing or medical tape  Skin barrier  Protective skin film  Trash bag  Remove the old bandage, and check the tube entry site.    Have the patient lie on his side with the nephrostomy tube entry site facing up. Place the under-pad where it will catch drainage as you are working with the nephrostomy tube.   Wash your hands with soap and water. Put on new medical gloves.  Gently remove the old bandage, without pulling on the tube. Do this by holding the skin beside the tube with one hand. With the other hand, gently remove sticky tape and the skin barrier by pulling in the same direction as hair growth. Do not touch the side of the bandage that is placed over or around the tube. Throw the bandage and skin barrier away in a trash bag.  Look for signs of infection, such as skin redness and swelling. Report any skin changes to healthcare providers.  Clean the tube entry site.    Hold the tube in place to keep it from being  pulled out while you are cleaning around it.  You will need to clean the area twice. For the first cleaning, wet a new gauze bandage with soap and water.  Begin at the entry site of the tube. Wipe the skin in circles, moving away from the entry site. Remove blood and any other material with the gauze. Do this as often as needed. Use a new gauze bandage each time you clean the area, moving away from the entry site.   For the second cleaning, wet a new gauze bandage with water. Begin at the entry site of the tube. Wipe the skin in circles, moving away from the entry site. Use a new gauze bandage each time you clean the area, moving away from the entry site.   Gently pat the skin with a clean washcloth to dry it.    Apply the skin barrier and bandages.    Roll up a bandage to make it thick, and place it under  the place where the tube enters the skin. Place it to support the tube, and stop it from kinking or bending. Tape the bandage in place, and apply more bandages if directed by a healthcare provider.   Bring the tubing forward to the front and tape it to the skin. Do not stretch the tube tight, because this may pull the nephrostomy tube out.  How often to change the bandage.  Change the bandage around the tube, every other day. If your bandages  get dirty or wet, change them right away, and as often as needed. If your nephrostomy tube is to be used for a long period of time, the tube needs to be changed every 2 to 3 months. Healthcare providers will tell you when you need to make an appointment to have your tube changed.     How to care for the urine drainage bag:   Ask if you need to measure and write down how much urine is in the bag before you empty it. Drain urine out of the drainage bag when it is ½ to ? full. Open the spout at the bottom of the bag to empty the urine into the toilet.   You may need to detach the drainage bag from the nephrostomy tube to change it.. If so, attach a new drainage bag tightly to  the nephrostomy tube.     How to prevent problems with your nephrostomy tube:   Change bandages, directed.  This helps to prevent infection. Throw away or clean your drainage bag as directed by your healthcare provider.    Wipe the connecting ends of the drainage bag with alcohol before you reconnect the bag to the tube.  This helps prevent infection.     Keep the tube taped to your skin and connected to a drainage bag placed below the level of your kidneys.  This helps prevent urine from backing up into your kidneys. You may wear a small drainage bag strapped to your leg to let you move around more easily.    Check the catheter to be sure it is in place after you change your clothes or do other activities.  Do not wear tight clothing over the tube. Place the tubing over your thigh rather than under it when you are sitting down. Be sure that nothing is pulling on the nephrostomy tube when you move around.    Change positions if you see little or no urine in your drainage bag.  Check to see if the urine tube is twisted or bent. Be sure that you are not sitting or lying on the tube. If there are no kinks and there is little or no urine in the drainage bag, tell your healthcare provider.    Flush out the tube as directed. Some tubes get flushed one time a day with 10 mls of NSS You will be given a prescription for the flushes.  To flush the nephrostomy tube, clean both connections with alcohol swap. Twist off the drainage bag tube and twist the saline syringe into the nephrostomy tube and flush briskly. Remove the syringe and twist the drainage bag tube back into the nephrostomy tube.  Keep the site covered while you shower.  Tape a piece of clear adhesive plastic over the dressing to keep it dry while you shower. Do not take tub baths.    Contact Interventional Radiology at [phone] (MACIEJ PATIENTS: Contact Interventional Radiology at [phone]) (MICHAEL PATIENTS: Contact Interventional Radiology at  [phone]) if:  The skin around the nephrostomy tube is red, swollen, itches, or has a rash.   You have a fever greater than 101 or chills.  You have lower back or hip pain.  There are changes in how your urine looks or smells.  You have little or no urine draining from the nephrostomy tube.   You have nausea and are vomiting.  The black bernardo on your tube has moved, or the tube is longer than when it was put in.   You have questions or concerns about your condition or care.  The nephrostomy tube comes out completely.   There is blood, pus, or a bad smell coming from the place where the tube enters your skin.  Urine is leaking around the tube.        The following pharmacies carry the flush syringes.       [facility]                     [address]                     [address] [facility]                    [phone]  [place]                       [place]  Phone [phone]            Phone [phone]                 [facility]                                                                                                   [phone]  [facility]             [facility]                             [address] [place], [place] [address] [facility]   Luis SANCHEZ                                 [phone]  Phone [phone]            Phone [phone]    [facility]                                                                         [facility] [phone]  Samuel Luz.  [place]   Phone [phone]

## 2024-02-09 NOTE — SEDATION DOCUMENTATION
Bilateral nephrostomy tubes exchanged by Dr. Hernandez / Dr. Moody. Tubes sutured and to bag drainage. Dry dressings to both sites. Patient tolerated well.

## 2024-02-09 NOTE — BRIEF OP NOTE (RAD/CATH)
IR NEPHROSTOMY TUBE CHECK/CHANGE/REPOSITION/REINSERTION/UPSIZE Procedure Note    PATIENT NAME: Ragini Melendez  : 1955  MRN: [id number]    Pre-op Diagnosis:   1. Ureteral stricture      Post-op Diagnosis:   1. Ureteral stricture        Surgeon:   MD Thomas Duque MD    Estimated Blood Loss: None    Findings: Routine exchange of bilateral 10 Fr DM nephrostomy tubes.    Specimens: None    Complications:  None    Anesthesia: local and IV Fentanyl    Thomas Hernandez MD     Date: 2024  Time: 11:06 AM

## 2024-02-12 ENCOUNTER — OFFICE VISIT (OUTPATIENT)
Dept: HEMATOLOGY ONCOLOGY | Facility: CLINIC | Age: 69
End: 2024-02-12
Payer: COMMERCIAL

## 2024-02-12 VITALS
TEMPERATURE: 97.9 F | OXYGEN SATURATION: 97 % | HEIGHT: 57 IN | WEIGHT: 145 LBS | DIASTOLIC BLOOD PRESSURE: 64 MMHG | HEART RATE: 72 BPM | BODY MASS INDEX: 31.28 KG/M2 | SYSTOLIC BLOOD PRESSURE: 108 MMHG | RESPIRATION RATE: 18 BRPM

## 2024-02-12 DIAGNOSIS — I82.4Y1 ACUTE DEEP VEIN THROMBOSIS (DVT) OF PROXIMAL VEIN OF RIGHT LOWER EXTREMITY (HCC): Primary | ICD-10-CM

## 2024-02-12 DIAGNOSIS — R79.0 ABNORMAL IRON SATURATION: ICD-10-CM

## 2024-02-12 DIAGNOSIS — C79.89 SECONDARY MALIGNANT NEOPLASM OF OTHER SPECIFIED SITES (HCC): ICD-10-CM

## 2024-02-12 DIAGNOSIS — E43 UNSPECIFIED SEVERE PROTEIN-CALORIE MALNUTRITION (HCC): ICD-10-CM

## 2024-02-12 DIAGNOSIS — N18.4 STAGE 4 CHRONIC KIDNEY DISEASE (HCC): ICD-10-CM

## 2024-02-12 DIAGNOSIS — D69.6 THROMBOCYTOPENIA, UNSPECIFIED (HCC): ICD-10-CM

## 2024-02-12 DIAGNOSIS — E43 SEVERE PROTEIN-CALORIE MALNUTRITION (HCC): ICD-10-CM

## 2024-02-12 DIAGNOSIS — D47.2 MGUS (MONOCLONAL GAMMOPATHY OF UNKNOWN SIGNIFICANCE): ICD-10-CM

## 2024-02-12 DIAGNOSIS — D50.8 IRON DEFICIENCY ANEMIA SECONDARY TO INADEQUATE DIETARY IRON INTAKE: ICD-10-CM

## 2024-02-12 DIAGNOSIS — Z86.718 HISTORY OF DVT (DEEP VEIN THROMBOSIS): ICD-10-CM

## 2024-02-12 PROCEDURE — 99214 OFFICE O/P EST MOD 30 MIN: CPT | Performed by: PHYSICIAN ASSISTANT

## 2024-02-12 NOTE — PROGRESS NOTES
Hematology/Oncology Outpatient Follow- up Note  Ragini Melendez 68 y.o. female MRN: @ Encounter: [id number]        Date:  2/12/2024      Assessment / Plan:    1. Chronic Anemia, multifactorial 2nd to iron deficiency, chronic kidney disease; chronic disease.      Hgb 10.5 7/7/2019 with platelet count 458 (7/30/2019 C1D1 Taxol Carbo)  Hemoglobin 9 - 11g/dL range in 2019 when undergoing chemo/rt for endometrial ca.  10/2/2019 iron saturation 17%; ferritin 1000.     6/27/23 Hemoglobin 9.1 with MCV 97.    11/16/23 hemoglobin 9.8, MCV 95, white blood cell count 8.11, platelets 298           2. Iron deficiency in a patient who is status post gastric bypass surgery in 2001.  Chronic elevation of ferritin 400-1600 (secondary to inflammation). Iron saturation 14% to 39% since 2019 February through April 2023 - Venofer 200 mg weekly x8 (Iron saturation 14% 1/2021 -> 39% 5/4/2023 -> 20% 6/27/23).  Prior to this, she never received IV iron, though evidenced by her CBC 7/2019 (anemia, thrombocytosis) she was likely iron deficient then.     History of Acute on chronic anemia. She received 1 unit packed red blood cells May 2023 during hospitalization when hemoglobin decreased to 6.9.      EGD and colonoscopy 12/2023 - unremarkable.          3. B12 deficiency. 5/2023 normal MMA; B12 level 821. She does take oral B12 daily.         4.   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  Normal total protein, albumin, calcium.         5. Endometrial Ca. Diagnosed initially 2017. S/P total laparoscopic hysterectomy and bilateral salpingo-oophorectomy and bilateral pelvic lymph node dissection December 2017.  2019 -presented with right lower extremity DVT. CT identified right pelvic sidewall mass with venous, ureteral nerve  compression  7/8/2019 LN biopsy- High-grade adenocarcinoma.  She received Taxol carboplatin x6 doses July 2019 through January 2020. She received adjuvant external beam RT February 24 through April 20, 2020.  11/2020 -new retroperitoneal lymphadenopathy identified. 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 On surveillance with gynecologic oncology.         6. Ureteral strictures, hydronephrosis 2nd to pelvic RT.   She has had bilateral ureteral stents in place. Hospitalized x 2 5/2023 with [facility].  Percutaneous nephrostomy tubes placed 5/26 - 5/31/23 admission. She has had nephrostomy tubes placed multiple times over the years since 2019. Ureteral stents removed 8/1/23 by Dr. Lowe. PCN tubes remain in place.      Cr 2 - 2.1 7/2023 - 8/2023.      7 .History of RLE DVT dx in 2019. Induced 2nd to malignancy. Was on Lovenox. D/C'd 6/2023 per gyn/onc.     8. L5 compression deformity on 11/2020 CT as well as incompletely healed fx superior pubic ramus. Sacral sclerosis which may be 2nd to radiation, insufficiency fractures.   On 8/2020 pet/ctk right medial pubic bone developing fracture noted.  Dexa scan 11/29/23  -osteoporosis T-score -2.5 left femoral neck and -2.6 in the left total hip, -2.5 lumbar spine.  She was referred to rheumatology by PCP for management.  Appointment 2/13/24        Repeat labs requested now and prior to 6 month f/u       HPI: Ragini Rachellperla is seen for initial consultation 2/8/2023 regarding iron deficiency.      The patient has a history of endometrial cancer. S/P surgery 2017, chemo/RT for pelvic recurrence 2019; immunotherapy/ monoclonal antibody treatment on clinical trial [id number].  History of left lower extremity DVT in 2020 (time of recurrence of endometrial cancer). She was on  anticoagulation through June 2023     History of gastric bypass surgery in 2001 by Dr. Mcmahon. Follows with [facility]  History of hypertension, myofascial pain, osteoporosis, right pubis fracture, chronic kidney disease, hydronephrosis, depression, GERD, hyperlipidemia, overactive bladder.  10/2016 hemoglobin 12.3, MCV 86,  October 2017 hemoglobin 12.5, MCV 83     9/2019 EGD esophagus appeared normal, very small gastric pouch, normal gastrojejunal anastomosis  10/2022 no monoclonal bands on SPEP.  10/31/22 CHERI +1:80; homogenous pattern; normal double-stranded antibody  Nonreactive hepatitis B and C antibodies. HIV nonreactive     Venofer 200 mg weekly x 8 February through April 2023        May 2023 acute kidney injury secondary to hydronephrosis secondary to endometrial cancer requiring percutaneous nephrostomy tubes        5/2023 normal MMA; B12 level 821     6/27/23 SPEP biclonal gammopathy  M1 IgG kappa 0.17 g/dL  M2 IgG lambda 0.36 g/dL  IgG 1700        Interval History:    12/12/23 EGD and colonoscopy-colonoscopy normal.  No further screening colonoscopy recommended as she is greater than 65 years old.  Previous gastric bypass surgery in the stomach.  Otherwise normal      Review of Systems   Constitutional:  Negative for appetite change, chills, diaphoresis, fatigue, fever and unexpected weight change.   HENT:   Negative for mouth sores, nosebleeds, sore throat, tinnitus and voice change.    Eyes:  Negative for eye problems.   Respiratory:  Negative for chest tightness, cough, shortness of breath and wheezing.    Cardiovascular:  Negative for chest pain, leg swelling and palpitations.   Gastrointestinal:  Negative for abdominal distention, abdominal pain, blood in stool, constipation, diarrhea, nausea, rectal pain and vomiting.   Endocrine: Negative for hot flashes.   Genitourinary: Negative.     Musculoskeletal:  Negative for gait problem and myalgias.   Skin:  Negative for itching and rash.  "  Neurological:  Negative for dizziness, gait problem, headaches, light-headedness and numbness.   Hematological:  Negative for adenopathy.   Psychiatric/Behavioral:  Negative for confusion and sleep disturbance. The patient is not nervous/anxious.         Test Results:        Labs:   Lab Results   Component Value Date    HGB 9.8 (L) 11/16/2023    HCT 33.2 (L) 11/16/2023    MCV 95 11/16/2023     11/16/2023    WBC 8.11 11/16/2023    NRBC 0 11/16/2023    BANDSPCT 1 05/12/2023    ATYLMPCT 3 (H) 01/17/2020     Lab Results   Component Value Date     10/27/2017    K 4.3 01/20/2024     01/20/2024    CO2 23 01/20/2024    BUN 66 (H) 01/20/2024    CREATININE 2.36 (H) 01/20/2024    GLUCOSE 219 (H) 12/19/2017    GLUF 125 (H) 11/16/2023    CALCIUM 8.9 01/20/2024    CORRECTEDCA 9.8 07/27/2023    AST 15 08/14/2023    ALT 14 08/14/2023    ALKPHOS 133 (H) 08/14/2023    PROT 6.9 10/27/2017    BILITOT 0.3 10/27/2017    EGFR 20 01/20/2024           Imaging: No results found.          Allergies:   Allergies   Allergen Reactions    Cephalosporins Rash     Which Cephalosporin reaction was to not specified; however, has tolerated Amoxicillin, Cefazolin, and Cefepime     Current Medications: Reviewed  PMH/FH/SH:  Reviewed      Physical Exam:    1.57 meters squared    Ht Readings from Last 3 Encounters:   02/12/24 4' 9\" (1.448 m)   02/09/24 4' 9\" (1.448 m)   01/29/24 4' 10.5\" (1.486 m)        Wt Readings from Last 3 Encounters:   02/12/24 65.8 kg (145 lb)   02/09/24 63.8 kg (140 lb 9.6 oz)   01/29/24 65.5 kg (144 lb 8 oz)        Temp Readings from Last 3 Encounters:   02/12/24 97.9 °F (36.6 °C) (Temporal)   02/09/24 (!) 97 °F (36.1 °C) (Temporal)   01/29/24 97.6 °F (36.4 °C) (Tympanic)        BP Readings from Last 3 Encounters:   02/12/24 108/64   02/09/24 102/57   01/29/24 100/74             Physical Exam  Vitals reviewed.   Constitutional:       General: She is not in acute distress.     Appearance: She is " well-developed. She is not diaphoretic.   HENT:      Head: Normocephalic and atraumatic.   Eyes:      Conjunctiva/sclera: Conjunctivae normal.   Neck:      Trachea: No tracheal deviation.   Cardiovascular:      Rate and Rhythm: Normal rate and regular rhythm.      Heart sounds: No murmur heard.     No friction rub. No gallop.   Pulmonary:      Effort: Pulmonary effort is normal. No respiratory distress.      Breath sounds: Normal breath sounds. No wheezing, rhonchi or rales.   Chest:      Chest wall: No tenderness.   Abdominal:      General: There is no distension.      Palpations: Abdomen is soft.      Tenderness: There is no abdominal tenderness.   Musculoskeletal:      Cervical back: Normal range of motion and neck supple.   Lymphadenopathy:      Cervical: No cervical adenopathy.   Skin:     General: Skin is warm and dry.      Coloration: Skin is not pale.      Findings: No erythema.   Neurological:      Mental Status: She is alert and oriented to person, place, and time.   Psychiatric:         Behavior: Behavior normal.         Thought Content: Thought content normal.         Judgment: Judgment normal.         ECO      Emergency Contacts:    Extended Emergency Contact Information  Primary Emergency Contact: Nahomi Melendez  Home Phone: [phone]  Mobile Phone: [phone]  Relation: Sister  Secondary Emergency Contact: AlFernando  Mobile Phone: [phone]  Relation: Brother

## 2024-02-13 ENCOUNTER — TELEMEDICINE (OUTPATIENT)
Dept: RHEUMATOLOGY | Facility: CLINIC | Age: 69
End: 2024-02-13

## 2024-02-13 DIAGNOSIS — M81.0 OSTEOPOROSIS WITHOUT CURRENT PATHOLOGICAL FRACTURE, UNSPECIFIED OSTEOPOROSIS TYPE: Primary | ICD-10-CM

## 2024-02-13 DIAGNOSIS — N18.4 STAGE 4 CHRONIC KIDNEY DISEASE (HCC): ICD-10-CM

## 2024-02-13 DIAGNOSIS — S32.501G: ICD-10-CM

## 2024-02-13 DIAGNOSIS — I82.4Y1 ACUTE DEEP VEIN THROMBOSIS (DVT) OF PROXIMAL VEIN OF RIGHT LOWER EXTREMITY (HCC): ICD-10-CM

## 2024-02-13 DIAGNOSIS — Z87.81 HISTORY OF HUMERUS FRACTURE: ICD-10-CM

## 2024-02-13 DIAGNOSIS — C54.1 ENDOMETRIAL CANCER (HCC): Chronic | ICD-10-CM

## 2024-02-13 NOTE — Clinical Note
Hi,  Saw this patient today for osteoporosis  She has contraindications to just about all of our medications except Prolia. In patients with CrCl < 30 I do like to talk with their nephrologist first to make sure that they don't think CKD mineral bone disease could be the etiology rather than osteoporosis, as we wouldn't necessarily treat that with Prolia.  Also wanted to ask how you would supplement calcium for her if/when we start Prolia to make sure she doesn't get hypocalcemic as it seems her calcium tends to be on the lower side of normal  Thanks in advance for your help  Best, Jordan

## 2024-02-13 NOTE — PROGRESS NOTES
Rheumatology Outpatient Consult Note  2/13/2024       Emely Yost DO  [address] [place] [address]    Reason for referral: osteoporosis    Assessment: 68 y.o. female referred for the above.    With clot history, would not use Evenity or Evista    With radiation history, would be wary of PTH analogues    With CrCl < 35 would not use Reclast    With CrCl < 25 would be hesitant to use alendronate    I think Prolia is really our main viable pharmacologic option. Before committing her to this I would want to make sure that her Nephrologist isn't concerned for CKD-MBD as that can be difficult to distinguish from osteoporosis    If we start Prolia will need to make sure she is on adequate calcium supplementation; will confer with Nephrology about how best to do this in setting of her CKD    Patient's rheumatologic disease(s) threaten long-term function if not appropriately treated.    Encounter Diagnosis     ICD-10-CM    1. Osteoporosis without current pathological fracture, unspecified osteoporosis type  M81.0 Ambulatory Referral to Rheumatology      2. Stage 4 chronic kidney disease (HCC)  N18.4       3. Endometrial cancer (HCC)  C54.1       4. Acute deep vein thrombosis (DVT) of proximal vein of right lower extremity ([facility])  I82.4Y1       5. Closed nondisplaced fracture of right pubis with delayed healing  S32.501G       6. History of humerus fracture  Z87.81           Plan:  -Will discuss the above with Nephrology; plan to precert for Prolia afterwards    Jordan Alvarado DO, CCD    Jordan Alvaardo DO, CCD  [facility]     History: Ragini Melendez is a(n) 68 y.o. female who is referred for the above. Relevant PMH endometrial cancer s/p chemotherapy, radiation therapy complicated by CKD4, history RLE DVT. Chemo finished as of Dec 2022.    Does have history right pubic ramus fracture and proximal humerus fracture      Past Medical History:   Diagnosis Date    Anemia     Anxiety     Cancer  (HCC)     ENDOMETRIAL    Chemotherapy induced neutropenia  8/30/2019    CKD (chronic kidney disease) stage 3, GFR 30-59 ml/min (HCC)     COVID     Fall 2022    Depression     DVT (deep venous thrombosis) (HCC) 07/19/2019    RIGHT LEG    Endometrial cancer (HCC) 12/2017    GERD (gastroesophageal reflux disease)     H/O gastric bypass     Hard to intubate     pt denies AS OF 7/25/23    History of chemotherapy     started 12/2021endometrial cancer- done 12/23/22    History of DVT in adulthood     RLE    History of transfusion 04/13/2023    Hyperglycemia     vx type 2 dm -- last assessed 4/1/14; resolved 11/7/17    Hyperlipidemia     Hypertension     in past- no meds at present    Iron deficiency anemia     iron infusions weekly    OAB (overactive bladder)     Port-A-Cath in place     Wears glasses        Past Surgical History:   Procedure Laterality Date    ABDOMINAL SURGERY      GASTRIC BYPASS; 2001    BREAST BIOPSY Right 06/28/2019    core biopsy; benign    CHOLECYSTECTOMY      at the time of gastric bypass    COLONOSCOPY      CT NEEDLE BIOPSY LYMPH NODE  07/08/2019    FL GUIDED CENTRAL VENOUS ACCESS DEVICE INSERTION  11/12/2019    FL RETROGRADE PYELOGRAM  03/30/2023    FL RETROGRADE PYELOGRAM  05/09/2023    FL RETROGRADE PYELOGRAM  8/1/2023    GASTRIC BYPASS      HYSTERECTOMY Bilateral 2017    total abdominal with salpingo-oophorectomy    IR NEPHROSTOMY TO NEPHROURETERAL STENT  06/09/2022    IR NEPHROSTOMY TO NEPHROURETERAL STENT  12/20/2022    IR NEPHROSTOMY TO NEPHROURETERAL STENT  8/8/2023    IR NEPHROSTOMY TUBE CHECK/CHANGE/REPOSITION/REINSERTION/UPSIZE  05/27/2022    IR NEPHROSTOMY TUBE CHECK/CHANGE/REPOSITION/REINSERTION/UPSIZE  11/10/2023    IR NEPHROSTOMY TUBE PLACEMENT  07/26/2019    IR NEPHROSTOMY TUBE PLACEMENT  05/27/2023    IR NEPHROURETERAL STENT CHECK/CHANGE/REPOSITION  04/06/2021    IR NEPHROURETERAL STENT CHECK/CHANGE/REPOSITION  06/04/2021    IR NEPHROURETERAL STENT CHECK/CHANGE/REPOSITION   09/03/2021    IR NEPHROURETERAL STENT CHECK/CHANGE/REPOSITION  12/07/2021    IR NEPHROURETERAL STENT CHECK/CHANGE/REPOSITION  03/08/2022    IR NEPHROURETERAL STENT CHECK/CHANGE/REPOSITION  05/10/2022    IR NEPHROURETERAL STENT CHECK/CHANGE/REPOSITION  09/19/2022    IR PICC LINE  09/27/2019    IR PORT PLACEMENT  07/26/2019    IR PORT PLACEMENT      IR PORT REMOVAL  09/20/2019    OOPHORECTOMY Bilateral 2017    CT CYSTO BLADDER W/URETERAL CATHETERIZATION Right 03/30/2023    Procedure: CYSTOSCOPY RETROGRADE PYELOGRAM WITH exchange of STENT URETERAL;  Surgeon: Demetrius Lowe MD;  [facility];  Service: Urology    CT CYSTO BLADDER W/URETERAL CATHETERIZATION Bilateral 05/09/2023    Procedure: CYSTOSCOPY, BILATERAL RETROGRADE PYELOGRAM, WITH LEFT INSERTION STENT URETERAL, RIGHT URTERAL STENT EXCHANGE;  Surgeon: Nicola Hannah MD;  [facility];  Service: Urology    CT CYSTO BLADDER W/URETERAL CATHETERIZATION Bilateral 8/1/2023    Procedure: CYSTOSCOPY BILATERAL RETROGRADE  WITH REMOVAL BILATERAL  STENT URETERAL;  Surgeon: Demetrius Lowe MD;  [facility];  Service: Urology    CT CYSTO W/INSERT URETERAL STENT Right 03/30/2023    Procedure: EXCHANGE STENT URETERAL;  Surgeon: Demetrius Lowe MD;  [facility];  Service: Urology    CT INSJ TUNNELED CTR VAD W/SUBQ PORT AGE 5 YR/> Left 11/12/2019    Procedure: INSERTION VENOUS PORT ( PORT-A-CATH) IR;  Surgeon: Edilberto Guzman DO;  [facility];  Service: Interventional Radiology    CT LAPS ABD PRTM&OMENTUM DX W/WO SPEC BR/WA SPX N/A 12/19/2017    Procedure: LAPAROSCOPY DIAGNOSTIC;  Surgeon: Evgeny So MD;  [facility];  Service: Gynecology Oncology    CT LAPS W/RAD HYST W/BILAT LMPHADEC RMVL TUBE/OVARY N/A 12/19/2017    Procedure: HYSTERECTOMY LAPAROSCOPIC TOTAL (LTH) W/ ROBOTICS; BILATERAL SALPINGOOPHERECTOMY; LYMPH NODE DISSECTION; lysis of adhesions;  Surgeon: Evgeny So MD;   [facility];  Service: Gynecology Oncology    REMOVAL URETERAL STENT Bilateral 8/1/2023    Procedure: REMOVAL STENT URETERAL;  Surgeon: Demetrius Lowe MD;  [facility];  Service: Urology    TONSILLECTOMY      US GUIDED BREAST BIOPSY RIGHT COMPLETE Right 06/28/2019       No outpatient medications have been marked as taking for the 2/13/24 encounter (Telemedicine) with Jordan Alvarado DO.       Allergies as of 02/13/2024 - Reviewed 02/09/2024   Allergen Reaction Noted    Cephalosporins Rash        Family History   Problem Relation Age of Onset    Hyperlipidemia Mother     Heart disease Mother     Ovarian cancer Mother 50    Colon cancer Mother     Lymphoma Father     Breast cancer Sister 63    No Known Problems Brother     No Known Problems Brother     No Known Problems Maternal Grandmother     Bone cancer Maternal Grandfather     Uterine cancer Paternal Grandmother     No Known Problems Paternal Grandfather     No Known Problems Maternal Aunt     No Known Problems Maternal Aunt     No Known Problems Maternal Aunt     No Known Problems Maternal Aunt     No Known Problems Paternal Aunt     No Known Problems Paternal Aunt     No Known Problems Paternal Aunt     No Known Problems Paternal Aunt        Social History:  Social History     Tobacco Use    Smoking status: Never    Smokeless tobacco: Never   Vaping Use    Vaping status: Never Used   Substance Use Topics    Alcohol use: Never    Drug use: Never       Review of Systems: Pertinent findings documented in HPI    ___________________________________      Lab Result Review: relevant labs reviewed  VitD okay  Calcium mildly low  Cr high  CrCl about 24    Imaging Result Review: relevant images reviewed    DXA: reviewed

## 2024-02-15 NOTE — TELEPHONE ENCOUNTER
2nd attempt: Called pt and lvm asking if she had checked the calendar and call us back to let us know if 3/22 appt is ok or reschedule as necessary. CB# given.

## 2024-02-16 ENCOUNTER — HOSPITAL ENCOUNTER (OUTPATIENT)
Dept: INFUSION CENTER | Facility: CLINIC | Age: 69
End: 2024-02-16
Payer: COMMERCIAL

## 2024-02-16 DIAGNOSIS — Z98.84 BARIATRIC SURGERY STATUS: ICD-10-CM

## 2024-02-16 DIAGNOSIS — N25.81 SECONDARY HYPERPARATHYROIDISM (HCC): Primary | ICD-10-CM

## 2024-02-16 DIAGNOSIS — Z45.2 ENCOUNTER FOR CENTRAL LINE CARE: Primary | ICD-10-CM

## 2024-02-16 DIAGNOSIS — K91.2 POSTSURGICAL MALABSORPTION: ICD-10-CM

## 2024-02-16 DIAGNOSIS — E66.3 OVERWEIGHT: ICD-10-CM

## 2024-02-16 DIAGNOSIS — Z48.815 ENCOUNTER FOR SURGICAL AFTERCARE FOLLOWING SURGERY OF DIGESTIVE SYSTEM: ICD-10-CM

## 2024-02-16 DIAGNOSIS — E83.39 HYPERPHOSPHATEMIA: ICD-10-CM

## 2024-02-16 DIAGNOSIS — N25.81 SECONDARY HYPERPARATHYROIDISM OF RENAL ORIGIN (HCC): ICD-10-CM

## 2024-02-16 DIAGNOSIS — C54.1 ENDOMETRIAL CANCER (HCC): ICD-10-CM

## 2024-02-16 LAB
ALBUMIN SERPL BCP-MCNC: 4.1 G/DL (ref 3.5–5)
ANION GAP SERPL CALCULATED.3IONS-SCNC: 7 MMOL/L
BUN SERPL-MCNC: 48 MG/DL (ref 5–25)
CALCIUM SERPL-MCNC: 8.8 MG/DL (ref 8.4–10.2)
CHLORIDE SERPL-SCNC: 104 MMOL/L (ref 96–108)
CO2 SERPL-SCNC: 23 MMOL/L (ref 21–32)
CREAT SERPL-MCNC: 2.11 MG/DL (ref 0.6–1.3)
GFR SERPL CREATININE-BSD FRML MDRD: 23 ML/MIN/1.73SQ M
GLUCOSE SERPL-MCNC: 96 MG/DL (ref 65–140)
PHOSPHATE SERPL-MCNC: 3.9 MG/DL (ref 2.3–4.1)
POTASSIUM SERPL-SCNC: 4.2 MMOL/L (ref 3.5–5.3)
PTH-INTACT SERPL-MCNC: 453.9 PG/ML (ref 12–88)
SODIUM SERPL-SCNC: 134 MMOL/L (ref 135–147)

## 2024-02-16 PROCEDURE — 80069 RENAL FUNCTION PANEL: CPT

## 2024-02-16 PROCEDURE — 84590 ASSAY OF VITAMIN A: CPT

## 2024-02-16 PROCEDURE — 84425 ASSAY OF VITAMIN B-1: CPT

## 2024-02-16 PROCEDURE — 83970 ASSAY OF PARATHORMONE: CPT

## 2024-02-16 RX ORDER — CALCITRIOL 0.25 UG/1
0.25 CAPSULE, LIQUID FILLED ORAL 3 TIMES WEEKLY
Qty: 12 CAPSULE | Refills: 3 | Status: SHIPPED | OUTPATIENT
Start: 2024-02-16 | End: 2024-05-16

## 2024-02-16 NOTE — PROGRESS NOTES
Patient here for lab work. Port flushed, blood return noted, labs drawn per order. Patient aware to scheduled next port flush/labs declined AVS.

## 2024-02-21 PROBLEM — Z12.39 BREAST CANCER SCREENING: Status: RESOLVED | Noted: 2019-06-19 | Resolved: 2024-02-21

## 2024-02-22 LAB — VIT B1 BLD-SCNC: 95.6 NMOL/L (ref 66.5–200)

## 2024-02-24 LAB — VIT A SERPL-MCNC: 41.2 UG/DL (ref 22–69.5)

## 2024-03-14 ENCOUNTER — RA CDI HCC (OUTPATIENT)
Dept: OTHER | Facility: HOSPITAL | Age: 69
End: 2024-03-14

## 2024-03-14 PROBLEM — I82.4Y9 ACUTE DEEP VEIN THROMBOSIS (DVT) OF PROXIMAL VEIN OF LOWER EXTREMITY (HCC): Status: RESOLVED | Noted: 2019-06-19 | Resolved: 2024-03-14

## 2024-03-20 RX ORDER — ZOSTER VACCINE RECOMBINANT, ADJUVANTED 50 MCG/0.5
0.5 KIT INTRAMUSCULAR ONCE
Qty: 1 EACH | Refills: 1 | Status: CANCELLED | OUTPATIENT
Start: 2024-03-20 | End: 2024-03-20

## 2024-03-21 ENCOUNTER — OFFICE VISIT (OUTPATIENT)
Dept: FAMILY MEDICINE CLINIC | Facility: CLINIC | Age: 69
End: 2024-03-21
Payer: COMMERCIAL

## 2024-03-21 VITALS
BODY MASS INDEX: 29.99 KG/M2 | RESPIRATION RATE: 16 BRPM | DIASTOLIC BLOOD PRESSURE: 76 MMHG | OXYGEN SATURATION: 99 % | HEART RATE: 71 BPM | HEIGHT: 57 IN | SYSTOLIC BLOOD PRESSURE: 116 MMHG | WEIGHT: 139 LBS

## 2024-03-21 DIAGNOSIS — D70.1 CHEMOTHERAPY INDUCED NEUTROPENIA (HCC): ICD-10-CM

## 2024-03-21 DIAGNOSIS — J34.89 SINUS PRESSURE: ICD-10-CM

## 2024-03-21 DIAGNOSIS — R06.83 SNORING: Primary | ICD-10-CM

## 2024-03-21 DIAGNOSIS — F33.9 DEPRESSION, RECURRENT (HCC): ICD-10-CM

## 2024-03-21 DIAGNOSIS — T45.1X5A CHEMOTHERAPY INDUCED NEUTROPENIA (HCC): ICD-10-CM

## 2024-03-21 PROCEDURE — G2211 COMPLEX E/M VISIT ADD ON: HCPCS | Performed by: FAMILY MEDICINE

## 2024-03-21 PROCEDURE — 99214 OFFICE O/P EST MOD 30 MIN: CPT | Performed by: FAMILY MEDICINE

## 2024-03-21 RX ORDER — CETIRIZINE HYDROCHLORIDE 10 MG/1
10 TABLET ORAL DAILY
Qty: 30 TABLET | Refills: 1 | Status: SHIPPED | OUTPATIENT
Start: 2024-03-21

## 2024-03-21 RX ORDER — FLUTICASONE PROPIONATE 50 MCG
1 SPRAY, SUSPENSION (ML) NASAL DAILY
Qty: 15.8 ML | Refills: 0 | Status: SHIPPED | OUTPATIENT
Start: 2024-03-21

## 2024-03-21 NOTE — PROGRESS NOTES
Assessment/Plan:   Diagnoses and all orders for this visit:    Snoring  -     Ambulatory Referral to Sleep Medicine; Future  -     fluticasone (FLONASE) 50 mcg/act nasal spray; 1 spray into each nostril daily  -     cetirizine (ZyrTEC) 10 mg tablet; Take 1 tablet (10 mg total) by mouth daily  - likely 2/2 congestion and seasonal allergies  - advised Flonase, Zyrtec, cool mist humidifier and/or hot shower sauna   - has been evaluated by Sleep Med in the past - no SAMMY - referral given if no change with above mentioned regimen - pt aware and agreeable   Sinus pressure  -     fluticasone (FLONASE) 50 mcg/act nasal spray; 1 spray into each nostril daily  -     cetirizine (ZyrTEC) 10 mg tablet; Take 1 tablet (10 mg total) by mouth daily    Chemotherapy induced neutropenia     Depression, recurrent (HCC)    BMI 30.0-30.9,adult   - following with [employer] and has lost 10lbs   - per program, pt's goal weight = 116lbs  - given her PMHx, note given to change goal weight = 130lbs             Subjective:    Patient ID: Ragini Melendez is a 68 y.o. female.  HPI  69yo F presents to the office for f/u   - trying to get on renal transplant list - has specialist c/s scheduled for 4/9/2024   - (+) snoring   - (+) interrupted sleep as has to wake up Q4 to drain her urine bags   - (+) frontal HA (which got worse this week), sinus pressure, scratchy throat, PND  - was nauseous yesterday   - denies F/C/diminished hearing/V, CP or tightness/SOB  - denies sick contacts   - has been following with [employer] - needs a note staying that per her Doc, goal weight is 130 (not 116lbs)       The following portions of the patient's history were reviewed and updated as appropriate: allergies, current medications, past family history, past medical history, past social history, past surgical history and problem list.    Review of Systems  as per HPI    Objective:  /76 (BP Location: Left arm, Patient Position: Sitting, Cuff Size:  "Standard)   Pulse 71   Resp 16   Ht 4' 9\" (1.448 m)   Wt 63 kg (139 lb)   LMP  (LMP Unknown)   SpO2 99%   BMI 30.08 kg/m²    Physical Exam  Vitals reviewed.   Constitutional:       General: She is not in acute distress.     Appearance: Normal appearance. She is not ill-appearing, toxic-appearing or diaphoretic.   HENT:      Head: Normocephalic and atraumatic.      Right Ear: External ear normal. There is impacted cerumen.      Left Ear: External ear normal. There is no impacted cerumen.      Nose: Congestion present.      Right Sinus: Maxillary sinus tenderness present. No frontal sinus tenderness.      Left Sinus: Maxillary sinus tenderness present. No frontal sinus tenderness.      Mouth/Throat:      Mouth: Mucous membranes are dry.      Pharynx: Oropharynx is clear. No oropharyngeal exudate or posterior oropharyngeal erythema.   Eyes:      General: No scleral icterus.        Right eye: No discharge.         Left eye: No discharge.      Extraocular Movements: Extraocular movements intact.      Conjunctiva/sclera: Conjunctivae normal.   Cardiovascular:      Rate and Rhythm: Normal rate and regular rhythm.      Heart sounds: Normal heart sounds.   Pulmonary:      Effort: Pulmonary effort is normal. No respiratory distress.      Breath sounds: Normal breath sounds. No stridor. No wheezing, rhonchi or rales.   Musculoskeletal:         General: Normal range of motion.      Cervical back: Normal range of motion.   Skin:     General: Skin is warm.   Neurological:      General: No focal deficit present.      Mental Status: She is alert and oriented to person, place, and time.   Psychiatric:         Mood and Affect: Mood normal.         Behavior: Behavior normal.         BMI Counseling: Body mass index is 30.08 kg/m². The BMI is above normal. Nutrition recommendations include 3-5 servings of fruits/vegetables daily. Exercise recommendations include exercising 3-5 times per week.    "

## 2024-03-22 ENCOUNTER — TELEPHONE (OUTPATIENT)
Dept: UROLOGY | Facility: AMBULATORY SURGERY CENTER | Age: 69
End: 2024-03-22

## 2024-03-22 NOTE — TELEPHONE ENCOUNTER
Called and spoke with patient. Per Dr. Lowe, there was an emergency in the OR and patient needs to be rescheduled. Informed we will call back once we have a date.

## 2024-04-08 DIAGNOSIS — N13.30 HYDRONEPHROSIS, UNSPECIFIED HYDRONEPHROSIS TYPE: Primary | ICD-10-CM

## 2024-04-08 RX ORDER — SODIUM CHLORIDE 9 MG/ML
10 INJECTION, SOLUTION INTRAMUSCULAR; INTRAVENOUS; SUBCUTANEOUS DAILY
Qty: 300 ML | Refills: 2 | Status: SHIPPED | OUTPATIENT
Start: 2024-04-08 | End: 2024-07-07

## 2024-04-09 ENCOUNTER — OFFICE VISIT (OUTPATIENT)
Dept: NEPHROLOGY | Facility: CLINIC | Age: 69
End: 2024-04-09

## 2024-04-09 VITALS — SYSTOLIC BLOOD PRESSURE: 112 MMHG | BODY MASS INDEX: 30.08 KG/M2 | HEIGHT: 57 IN | DIASTOLIC BLOOD PRESSURE: 60 MMHG

## 2024-04-09 DIAGNOSIS — N18.4 STAGE 4 CHRONIC KIDNEY DISEASE (HCC): ICD-10-CM

## 2024-04-09 DIAGNOSIS — Z01.818 PRE-TRANSPLANT EVALUATION FOR CKD (CHRONIC KIDNEY DISEASE): Primary | ICD-10-CM

## 2024-04-09 NOTE — LETTER
April 9, 2024     Emely Yost DO  [address] [facility] [id number]    Patient: Ragini Melendez   YOB: 1955   Date of Visit: 4/9/2024       Dear Dr. Yost:    Thank you for referring Ragini Melendez to me for evaluation. Below are my notes for this consultation.    If you have questions, please do not hesitate to call me. I look forward to following your patient along with you.         Sincerely,        Mily oBnd MD        CC: MD Mily Walker MD  4/9/2024 10:41 AM  Sign when Signing Visit  Assessment   Ragini Melendez is a 68 y.o. female  referred by Dr Villalobos, with a past medical history of CKD stage IV, with creat 2.11 mg/dL and eGFR 23, with native kidney disease felt to be secondary to hypertension/obstructive uropathy with bilateral ureteral strictures/possible glomerular pathology in setting of VEG F inhibitor/age-related nephron loss, recurrent endometrial cancer first diagnosed in 2017 status post total abdominal hysterectomy and bilateral salpingo-oophorectomy status post chemotherapy with VEGF inhibitor which completed December 2022, no evidence of disease and surveillance every 6 months currently, bilateral ureteral strictures possibly from radiation therapy with bilateral nephrostomy tubes, history of DVT, history of gastric bypass, nephrolithiasis, UTIs frequently during chemotherapy, seen in the Nephrology Clinic for pre-transplant kidney evaluation.    Prior DEXA scan in November 2023 with osteoporosis.    CT scan without IV contrast in May 2023 with moderate right and mild to moderate left hydronephrosis with ureteral stents.  Persistent hydronephrosis raises concern for malfunction of the catheters.    Prior echocardiogram December 2022 with EF 60%.    Plan   1.  Overall, patient may currently be too high risk to move forward for evaluation from medical standpoint.  Last dose of chemotherapy was December 2022 but she has  not had recurrence since.  I explained to the patient that we generally need 3 to 5 years of disease-free time before we would be able to safely transplant but we could consider moving forward with evaluation sooner if the risk of recurrence is lower.  I have reached out to her oncologist from gynecological team to review the case further.  I have also reached out to the urologist given patient has bilateral PCNs and also with urinary incontinence.  Appears to have functional bladder but she does have incontinence requiring medication and also with bilateral PCN.  She states that she is being evaluated by urology team soon to see if the next best step is to maintain the PCNs or for consideration of reconstructive surgery.  I will see the patient back in 1 year.  Patient's sister was present during this conversation.  2. The need to avoid blood transfusion to decrease allosensitization was explained to the patient.  3. The advantages of a living donor over a  donor was explained to the patient and family. I strongly encouraged the patient and family to pursue a living donor.  4-has a history of bilateral nephrostomy tubes secondary to ureteral stricture secondary to radiation prior.  Percutaneous nephrostomy tubes were placed in May 2023 but patient has required nephrostomy tubes multiple times since 2019.  Patient follows with urology team.  5-history of endometrial cancer diagnosed in 2017 status post surgery as outlined above and completed chemotherapy.  Patient first had surgery in , and then in 2019 had lymph node biopsy with high-grade adenocarcinoma and received carboplatin x 6 doses from 2019 to 2020 and then received adjuvant radiation therapy which completed in  complicated by new retroperitoneal lymphadenopathy (diagnosed after new DVT) and then started on chemotherapy with rucaparib, atezolizumab, and bevacizumab from 2020 until 2022.  Currently no  evidence of disease and is on surveillance follow-up wit gynecology.  Will need hematology and gynecology clearance.  Follows with hematology at [facility].  6-history of DVT-diagnosed in  after having RLE swelling.   7-history of gastric bypass in . BMI currently 30. Prior was 270 lbs prior to . Then gained weight but during chemotherapy was 103 lbs at lowest. Is now 139 lbs.   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 I have strongly encouraged her to pursue living donation, and explained to her the benefits of living donation over  donor transplantation. We have briefly discussed the surgical procedure. We have discussed about the need for life long immunosuppression and the importance of compliance. We have discussed the side effects of immunosuppression including but not limited to infection, malignancies and developing/worsening diabetes control. Ragini Al verbalized understanding and is interested in pursuing transplant. I have also encouraged patient to continue follow up with all of his physicians as he is. Today's visit is strictly for renal transplant medical evaluation, and patient knows to follow up with his physicians for his chronic management of his care. Patient also knows to go to the emergency room if need arises for urgent/emergent issues.     I have spent a total time of 60 minutes on 24 in caring for this patient including Diagnostic results, Prognosis, Risks and benefits of tx options, Patient and family education, Impressions, Counseling / Coordination of care, Documenting in the medical record, Reviewing / ordering tests, medicine, procedures  , and Obtaining or reviewing history  . I counseled the patient regarding the risks and benefits of  kidney transplantation, the immediate and long-term complications of kidney transplantation, and the advantage of receiving a living donor kidney transplant.     It was a pleasure evaluating your patient in the office today. Thank you for allowing our team to participate in the care of Ms Ragini Melendez. Please do not hesitate to contact our team if further issues/questions shall arise in the interim.     HISTORY OF PRESENT ILLNESS    Ragini Melendez is a 68 y.o. female seen in the Nephrology Clinic for evaluation for kidney transplant.    Renal disease is not biopsy proven.     Primary Nephrologist is Dr Villalobos.    HD unit - n/a    Native Urine Output: yes  Hx of voiding difficulty: yes, b/l PCN. Still makes urine thru urethra also  Hx of hematuria: no  Hx of proteinuria: yes  Hx of nephrolithiasis: yes  Hx of recurrent urinary tract infection: no    HTN: onset n/a currently; did have HTN prior to bypass surgery,      DM type n/a: was pre diabetic prior to bypass surgery    PAD/PVD: no, Claudication: no    Cardiac history - CAD: no, MI: yes     Primary Cardiologist: n/l    Exercise tolerance: minimal    Hx of CVA: no    Hx of DVT/PE: prior; was on lovenox    Viral Infection:    HIV: no  Hep B: no  Hep C: no    Cancer: History of cancer: endometrial ca in pt. Sister with breast ca, mother with ovarian ca, father with non hodgkin.     Health maintenance and other pertinent history:    Female:    Colonoscopy: 12/2023  PAPs: Yes/No  Mammogram: up to date    GYN History:    No prior pregnancies    Blood transfusion: yes    Last admission 5/2023    Review Of Systems:     Constitutional: nl appetite.  no fevers, chills, involuntary weight gain or weight loss.  Eyes: wears eye glasses.  ENT: no ear disease, epistaxis or oral ulcers.  Respiratory: no SOB, cough, hemoptysis, ARORA.  Cardiovascular: no CP, palpitations, claudication, edema.  GI: no N/V/D/C, abdominal pain, melena, BRBPR.  : see HPI  Endocrine: no thyroid  disease  Heme: no bleeding or clotting disorders or swollen lymph nodes  MS: no joint effusions or deformities.  Skin: no skin disease  Neuro: no HA, seizures, numbness, tingling, focal weakness  Psych: + depression controlled with abilify and venlafaxine    Lives with mother    Employment history: retired teacher; taught special education    HD Access: n/a  Abdominal Surgeries: gastric bypass; endometrial ca    Smoke: non smoker  ETOH: none  Drugs: none    Past Medical History:   Diagnosis Date   • Anemia    • Anxiety    • Cancer (HCC)     ENDOMETRIAL   • Chemotherapy induced neutropenia (HCC) 8/30/2019   • CKD (chronic kidney disease) stage 3, GFR 30-59 ml/min (HCC)    • COVID     Fall 2022   • Depression    • DVT (deep venous thrombosis) (HCC) 07/19/2019    RIGHT LEG   • Endometrial cancer (HCC) 12/2017   • GERD (gastroesophageal reflux disease)    • H/O gastric bypass    • Hard to intubate     pt denies AS OF 7/25/23   • History of chemotherapy     started 12/2021endometrial cancer- done 12/23/22   • History of DVT in adulthood     RLE   • History of transfusion 04/13/2023   • Hyperglycemia     vx type 2 dm -- last assessed 4/1/14; resolved 11/7/17   • Hyperlipidemia    • Hypertension     in past- no meds at present   • Iron deficiency anemia     iron infusions weekly   • OAB (overactive bladder)    • Port-A-Cath in place    • Wears glasses      Past Surgical History:   Procedure Laterality Date   • ABDOMINAL SURGERY      GASTRIC BYPASS; 2001   • BREAST BIOPSY Right 06/28/2019    core biopsy; benign   • CHOLECYSTECTOMY      at the time of gastric bypass   • COLONOSCOPY     • CT NEEDLE BIOPSY LYMPH NODE  07/08/2019   • FL GUIDED CENTRAL VENOUS ACCESS DEVICE INSERTION  11/12/2019   • FL RETROGRADE PYELOGRAM  03/30/2023   • FL RETROGRADE PYELOGRAM  05/09/2023   • FL RETROGRADE PYELOGRAM  8/1/2023   • GASTRIC BYPASS     • HYSTERECTOMY Bilateral 2017    total abdominal with salpingo-oophorectomy   • IR NEPHROSTOMY TO  NEPHROURETERAL STENT  06/09/2022   • IR NEPHROSTOMY TO NEPHROURETERAL STENT  12/20/2022   • IR NEPHROSTOMY TO NEPHROURETERAL STENT  8/8/2023   • IR NEPHROSTOMY TUBE CHECK/CHANGE/REPOSITION/REINSERTION/UPSIZE  05/27/2022   • IR NEPHROSTOMY TUBE CHECK/CHANGE/REPOSITION/REINSERTION/UPSIZE  11/10/2023   • IR NEPHROSTOMY TUBE CHECK/CHANGE/REPOSITION/REINSERTION/UPSIZE  2/9/2024   • IR NEPHROSTOMY TUBE PLACEMENT  07/26/2019   • IR NEPHROSTOMY TUBE PLACEMENT  05/27/2023   • IR NEPHROURETERAL STENT CHECK/CHANGE/REPOSITION  04/06/2021   • IR NEPHROURETERAL STENT CHECK/CHANGE/REPOSITION  06/04/2021   • IR NEPHROURETERAL STENT CHECK/CHANGE/REPOSITION  09/03/2021   • IR NEPHROURETERAL STENT CHECK/CHANGE/REPOSITION  12/07/2021   • IR NEPHROURETERAL STENT CHECK/CHANGE/REPOSITION  03/08/2022   • IR NEPHROURETERAL STENT CHECK/CHANGE/REPOSITION  05/10/2022   • IR NEPHROURETERAL STENT CHECK/CHANGE/REPOSITION  09/19/2022   • IR PICC LINE  09/27/2019   • IR PORT PLACEMENT  07/26/2019   • IR PORT PLACEMENT     • IR PORT REMOVAL  09/20/2019   • OOPHORECTOMY Bilateral 2017   • MI CYSTO BLADDER W/URETERAL CATHETERIZATION Right 03/30/2023    Procedure: CYSTOSCOPY RETROGRADE PYELOGRAM WITH exchange of STENT URETERAL;  Surgeon: Demetrius Lowe MD;  [facility];  Service: Urology   • MI CYSTO BLADDER W/URETERAL CATHETERIZATION Bilateral 05/09/2023    Procedure: CYSTOSCOPY, BILATERAL RETROGRADE PYELOGRAM, WITH LEFT INSERTION STENT URETERAL, RIGHT URTERAL STENT EXCHANGE;  Surgeon: Nicola Hannah MD;  Location[facility];  Service: Urology   • MI CYSTO BLADDER W/URETERAL CATHETERIZATION Bilateral 8/1/2023    Procedure: CYSTOSCOPY BILATERAL RETROGRADE  WITH REMOVAL BILATERAL  STENT URETERAL;  Surgeon: Demetrius Lowe MD;  [facility]: [facility];  Service: Urology   • MI CYSTO W/INSERT URETERAL STENT Right 03/30/2023    Procedure: EXCHANGE STENT URETERAL;  Surgeon: Demetrius Lowe MD;  Location[facility];   Service: Urology   • HI INSJ TUNNELED CTR VAD W/SUBQ PORT AGE 5 YR/> Left 11/12/2019    Procedure: INSERTION VENOUS PORT ( PORT-A-CATH) IR;  Surgeon: Edilberto Guzman DO;  [facility];  Service: Interventional Radiology   • HI LAPS ABD PRTM&OMENTUM DX W/WO SPEC BR/WA SPX N/A 12/19/2017    Procedure: LAPAROSCOPY DIAGNOSTIC;  Surgeon: Evgeny So MD;  [facility]: [facility];  Service: Gynecology Oncology   • HI LAPS W/RAD HYST W/BILAT LMPHADEC RMVL TUBE/OVARY N/A 12/19/2017    Procedure: HYSTERECTOMY LAPAROSCOPIC TOTAL (LTH) W/ ROBOTICS; BILATERAL SALPINGOOPHERECTOMY; LYMPH NODE DISSECTION; lysis of adhesions;  Surgeon: Evgeny So MD;  [facility]: [facility];  Service: Gynecology Oncology   • REMOVAL URETERAL STENT Bilateral 8/1/2023    Procedure: REMOVAL STENT URETERAL;  Surgeon: Demetrius Lowe MD;  [facility];  Service: Urology   • TONSILLECTOMY     • US GUIDED BREAST BIOPSY RIGHT COMPLETE Right 06/28/2019       Family History   Problem Relation Age of Onset   • Hyperlipidemia Mother    • Heart disease Mother    • Ovarian cancer Mother 50   • Colon cancer Mother    • Lymphoma Father    • Breast cancer Sister 63   • No Known Problems Brother    • No Known Problems Brother    • No Known Problems Maternal Grandmother    • Bone cancer Maternal Grandfather    • Uterine cancer Paternal Grandmother    • No Known Problems Paternal Grandfather    • No Known Problems Maternal Aunt    • No Known Problems Maternal Aunt    • No Known Problems Maternal Aunt    • No Known Problems Maternal Aunt    • No Known Problems Paternal Aunt    • No Known Problems Paternal Aunt    • No Known Problems Paternal Aunt    • No Known Problems Paternal Aunt      Social History     Socioeconomic History   • Marital status: Single     Spouse name: None   • Number of children: None   • Years of education: None   • Highest education level: None   Occupational History   • None   Tobacco Use   • Smoking status: Never    • Smokeless tobacco: Never   Vaping Use   • Vaping status: Never Used   Substance and Sexual Activity   • Alcohol use: Never   • Drug use: Never   • Sexual activity: Not Currently   Other Topics Concern   • None   Social History Narrative   • None     Social Determinants of Health     Financial Resource Strain: Low Risk  (9/26/2023)    Overall Financial Resource Strain (CARDIA)    • Difficulty of Paying Living Expenses: Not very hard   Food Insecurity: No Food Insecurity (5/30/2023)    Hunger Vital Sign    • Worried About Running Out of Food in the Last Year: Never true    • Ran Out of Food in the Last Year: Never true   Transportation Needs: No Transportation Needs (9/26/2023)    PRAPARE - Transportation    • Lack of Transportation (Medical): No    • Lack of Transportation (Non-Medical): No   Physical Activity: Not on file   Stress: Not on file   Social Connections: Not on file   Intimate Partner Violence: Not on file   Housing Stability: Low Risk  (5/30/2023)    Housing Stability Vital Sign    • Unable to Pay for Housing in the Last Year: No    • Number of Places Lived in the Last Year: 1    • Unstable Housing in the Last Year: No     Living donors: sister wants to be evaluate     Current Outpatient Medications   Medication Sig Dispense Refill   • ARIPiprazole (ABILIFY) 20 MG tablet Take 20 mg by mouth every morning     • BD PosiFlush 0.9 % SOLN      • calcitriol (ROCALTROL) 0.25 mcg capsule Take 1 capsule (0.25 mcg total) by mouth 3 (three) times a week 12 capsule 3   • calcium acetate (PHOSLO) capsule Take 1 capsule (667 mg total) by mouth 2 (two) times a day with meals 180 capsule 3   • Calcium-Magnesium-Vitamin D (CALCIUM 500 PO) Take 1 capsule by mouth daily at bedtime     • cetirizine (ZyrTEC) 10 mg tablet Take 1 tablet (10 mg total) by mouth daily 30 tablet 1   • cholecalciferol (VITAMIN D3) 1,000 units tablet Take 4 tablets (4,000 Units total) by mouth daily (Patient taking differently: Take 4,000 Units  by mouth daily at bedtime) 90 tablet 0   • Cyanocobalamin (VITAMIN B12 PO) Take 1 capsule by mouth daily after lunch     • dronabinol (MARINOL) 2.5 mg capsule Take 1 capsule (2.5 mg total) by mouth 2 (two) times a day before meals 30 capsule 0   • fluticasone (FLONASE) 50 mcg/act nasal spray 1 spray into each nostril daily 15.8 mL 0   • folic acid (FOLVITE) 1 mg tablet Take 1 tablet (1 mg total) by mouth daily (Patient taking differently: Take 1 mg by mouth daily at bedtime)  0   • furosemide (LASIX) 20 mg tablet TAKE 1 TABLET (20 MG TOTAL) BY MOUTH IF NEEDED (FOR INCREASED LEG SWELLING OR WEIGHT GAIN >2 LBS IN 1 DAY) 90 tablet 1   • Multiple Vitamin (MULTIVITAMIN) tablet Take 1 tablet by mouth every morning     • oxybutynin (DITROPAN XL) 15 MG 24 hr tablet Take 1 tablet (15 mg total) by mouth daily at bedtime 90 tablet 1   • saccharomyces boulardii (FLORASTOR) 250 mg capsule Take 1 capsule (250 mg total) by mouth daily after lunch     • senna (SENOKOT) 8.6 MG tablet Take 1 tablet (8.6 mg total) by mouth 2 (two) times a day as needed for constipation 60 tablet 0   • sodium bicarbonate 650 mg tablet Take 1 tablet (650 mg total) by mouth 3 (three) times a day 270 tablet 3   • venlafaxine (EFFEXOR-XR) 75 mg 24 hr capsule TAKE 3 CAPS DAILY     • Vibegron (Gemtesa) 75 MG TABS Take 1 capsule by mouth every morning 90 tablet 3   • omeprazole (PriLOSEC) 20 mg delayed release capsule Take 20 mg by mouth every morning (Patient not taking: Reported on 12/29/2023)     • sodium chloride, PF, 0.9 % 10 mL by Intracatheter route daily Intracatheter flushing daily. May substitute prefilled syringe with normal saline 10 mL vials, 10 mL syringes, and 18 g blunt needles 300 mL 2   • sodium chloride, PF, 0.9 % 10 mL by Intracatheter route daily for 120 doses Intracatheter flushing daily. May substitute prefilled syringe with normal saline 10 mL vials, 10 mL syringes, and 18 g blunt needles (Patient not taking: Reported on 1/29/2024) 300  "mL 3   • sodium chloride, PF, 0.9 % 10 mL by Intracatheter route daily for 120 doses Intracatheter flushing daily. May substitute prefilled syringe with normal saline 10 mL vials, 10 mL syringes, and 18 g blunt needles 300 mL 3   • sodium chloride, PF, 0.9 % 10 mL by Intracatheter route daily for 90 doses 300 mL 2     No current facility-administered medications for this visit.     Cephalosporins    Physical Exam:    /60   Ht 4' 9\" (1.448 m)   LMP  (LMP Unknown)   BMI 30.08 kg/m²     General : NAD    HEENT: perrla, anicteric, no thrush, dental some pulled teeth otherwise appropriate  Cardiac:  RRR, S1, S2 normal,  soft murmur    Lung:  CTAB   Abdomen:  soft, nontender, no ascites, midline surgical scar   femoral pulses (no bruit)   no edema   Neuro:no focal neuro deficits   Skin: tattoo no  Carotid bruit: no    "

## 2024-04-09 NOTE — PATIENT INSTRUCTIONS
1) We will review your case at the transplant committee meeting and will review our decision with you in approximately 4 weeks over the phone.  2) If you do not receive a call from [facility] within 4 weeks, please call our local Nephrology office.  3) From a renal transplant evaluation purpose, we will see you once a year in our local office for medical follow-up.  4) Once we call you with our decision regarding further evaluation, you will receive further instruction over the phone and in the mail regarding the next steps to complete the transplant evaluation.  5) All of your non transplant evaluation follow up regarding your regular medical follow ups, your renal care follow ups, and any other specialists visits you are following with should continue as you are advised by those respective physicians and continue to follow with their management and care.

## 2024-04-09 NOTE — PROGRESS NOTES
Assessment   Ragini Melendez is a 68 y.o. female  referred by Dr Villalobos, with a past medical history of CKD stage IV, with creat 2.11 mg/dL and eGFR 23, with native kidney disease felt to be secondary to hypertension/obstructive uropathy with bilateral ureteral strictures/possible glomerular pathology in setting of VEG F inhibitor/age-related nephron loss, recurrent endometrial cancer first diagnosed in 2017 status post total abdominal hysterectomy and bilateral salpingo-oophorectomy status post chemotherapy with VEGF inhibitor which completed December 2022, no evidence of disease and surveillance every 6 months currently, bilateral ureteral strictures possibly from radiation therapy with bilateral nephrostomy tubes, history of DVT, history of gastric bypass, nephrolithiasis, UTIs frequently during chemotherapy, seen in the Nephrology Clinic for pre-transplant kidney evaluation.    Prior DEXA scan in November 2023 with osteoporosis.    CT scan without IV contrast in May 2023 with moderate right and mild to moderate left hydronephrosis with ureteral stents.  Persistent hydronephrosis raises concern for malfunction of the catheters.    Prior echocardiogram December 2022 with EF 60%.    Plan   1.  Overall, patient may currently be too high risk to move forward for evaluation from medical standpoint.  Last dose of chemotherapy was December 2022 but she has not had recurrence since.  I explained to the patient that we generally need 3 to 5 years of disease-free time before we would be able to safely transplant but we could consider moving forward with evaluation sooner if the risk of recurrence is lower.  I have reached out to her oncologist from gynecological team to review the case further.  I have also reached out to the urologist given patient has bilateral PCNs and also with urinary incontinence.  Appears to have functional bladder but she does have incontinence requiring medication and also with bilateral  PCN.  She states that she is being evaluated by urology team soon to see if the next best step is to maintain the PCNs or for consideration of reconstructive surgery.  I will see the patient back in 1 year.  Patient's sister was present during this conversation.  2. The need to avoid blood transfusion to decrease allosensitization was explained to the patient.  3. The advantages of a living donor over a  donor was explained to the patient and family. I strongly encouraged the patient and family to pursue a living donor.  4-has a history of bilateral nephrostomy tubes secondary to ureteral stricture secondary to radiation prior.  Percutaneous nephrostomy tubes were placed in May 2023 but patient has required nephrostomy tubes multiple times since 2019.  Patient follows with urology team.  5-history of endometrial cancer diagnosed in  status post surgery as outlined above and completed chemotherapy.  Patient first had surgery in , and then in 2019 had lymph node biopsy with high-grade adenocarcinoma and received carboplatin x 6 doses from 2019 to 2020 and then received adjuvant radiation therapy which completed in  complicated by new retroperitoneal lymphadenopathy (diagnosed after new DVT) and then started on chemotherapy with rucaparib, atezolizumab, and bevacizumab from 2020 until 2022.  Currently no evidence of disease and is on surveillance follow-up wit gynecology.  Will need hematology and gynecology clearance.  Follows with hematology at [facility].  6-history of DVT-diagnosed in  after having RLE swelling.   7-history of gastric bypass in . BMI currently 30. Prior was 270 lbs prior to . Then gained weight but during chemotherapy was 103 lbs at lowest. Is now 139 lbs.   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 I have strongly encouraged her to pursue living donation, and explained to her the benefits of living donation over  donor transplantation. We have briefly discussed the surgical procedure. We have discussed about the need for life long immunosuppression and the importance of compliance. We have discussed the side effects of immunosuppression including but not limited to infection, malignancies and developing/worsening diabetes control. Ragini Melendez verbalized understanding and is interested in pursuing transplant. I have also encouraged patient to continue follow up with all of his physicians as he is. Today's visit is strictly for renal transplant medical evaluation, and patient knows to follow up with his physicians for his chronic management of his care. Patient also knows to go to the emergency room if need arises for urgent/emergent issues.     I have spent a total time of 60 minutes on 24 in caring for this patient including Diagnostic results, Prognosis, Risks and benefits of tx options, Patient and family education, Impressions, Counseling / Coordination of care, Documenting in the medical record, Reviewing / ordering tests, medicine, procedures  , and Obtaining or reviewing history  . I counseled the patient regarding the risks and benefits of kidney transplantation, the immediate and long-term complications of kidney transplantation, and the advantage of receiving a living donor kidney transplant.     It was a pleasure evaluating your patient in the office today. Thank you for allowing our team to participate in the care of Ms Ragini Melendez. Please do not hesitate to contact our team if further issues/questions shall arise in the interim.     HISTORY OF PRESENT ILLNESS    Ragini Melendez is a 68 y.o. female seen in the Nephrology Clinic for evaluation for kidney transplant.    Renal disease is not  biopsy proven.     Primary Nephrologist is Dr Villalobos.    HD unit - n/a    Native Urine Output: yes  Hx of voiding difficulty: yes, b/l PCN. Still makes urine thru urethra also  Hx of hematuria: no  Hx of proteinuria: yes  Hx of nephrolithiasis: yes  Hx of recurrent urinary tract infection: no    HTN: onset n/a currently; did have HTN prior to bypass surgery,      DM type n/a: was pre diabetic prior to bypass surgery    PAD/PVD: no, Claudication: no    Cardiac history - CAD: no, MI: yes     Primary Cardiologist: n/l    Exercise tolerance: minimal    Hx of CVA: no    Hx of DVT/PE: prior; was on lovenox    Viral Infection:    HIV: no  Hep B: no  Hep C: no    Cancer: History of cancer: endometrial ca in pt. Sister with breast ca, mother with ovarian ca, father with non hodgkin.     Health maintenance and other pertinent history:    Female:    Colonoscopy: 12/2023  PAPs: Yes/No  Mammogram: up to date    GYN History:    No prior pregnancies    Blood transfusion: yes    Last admission 5/2023    Review Of Systems:     Constitutional: nl appetite.  no fevers, chills, involuntary weight gain or weight loss.  Eyes: wears eye glasses.  ENT: no ear disease, epistaxis or oral ulcers.  Respiratory: no SOB, cough, hemoptysis, ARORA.  Cardiovascular: no CP, palpitations, claudication, edema.  GI: no N/V/D/C, abdominal pain, melena, BRBPR.  : see HPI  Endocrine: no thyroid disease  Heme: no bleeding or clotting disorders or swollen lymph nodes  MS: no joint effusions or deformities.  Skin: no skin disease  Neuro: no HA, seizures, numbness, tingling, focal weakness  Psych: + depression controlled with abilify and venlafaxine    Lives with mother    Employment history: retired teacher; taught special education    HD Access: n/a  Abdominal Surgeries: gastric bypass; endometrial ca    Smoke: non smoker  ETOH: none  Drugs: none    Past Medical History:   Diagnosis Date    Anemia     Anxiety     Cancer (HCC)     ENDOMETRIAL    Chemotherapy  induced neutropenia (HCC) 8/30/2019    CKD (chronic kidney disease) stage 3, GFR 30-59 ml/min (HCC)     COVID     Fall 2022    Depression     DVT (deep venous thrombosis) (HCC) 07/19/2019    RIGHT LEG    Endometrial cancer (HCC) 12/2017    GERD (gastroesophageal reflux disease)     H/O gastric bypass     Hard to intubate     pt denies AS OF 7/25/23    History of chemotherapy     started 12/2021endometrial cancer- done 12/23/22    History of DVT in adulthood     RLE    History of transfusion 04/13/2023    Hyperglycemia     vx type 2 dm -- last assessed 4/1/14; resolved 11/7/17    Hyperlipidemia     Hypertension     in past- no meds at present    Iron deficiency anemia     iron infusions weekly    OAB (overactive bladder)     Port-A-Cath in place     Wears glasses      Past Surgical History:   Procedure Laterality Date    ABDOMINAL SURGERY      GASTRIC BYPASS; 2001    BREAST BIOPSY Right 06/28/2019    core biopsy; benign    CHOLECYSTECTOMY      at the time of gastric bypass    COLONOSCOPY      CT NEEDLE BIOPSY LYMPH NODE  07/08/2019    FL GUIDED CENTRAL VENOUS ACCESS DEVICE INSERTION  11/12/2019    FL RETROGRADE PYELOGRAM  03/30/2023    FL RETROGRADE PYELOGRAM  05/09/2023    FL RETROGRADE PYELOGRAM  8/1/2023    GASTRIC BYPASS      HYSTERECTOMY Bilateral 2017    total abdominal with salpingo-oophorectomy    IR NEPHROSTOMY TO NEPHROURETERAL STENT  06/09/2022    IR NEPHROSTOMY TO NEPHROURETERAL STENT  12/20/2022    IR NEPHROSTOMY TO NEPHROURETERAL STENT  8/8/2023    IR NEPHROSTOMY TUBE CHECK/CHANGE/REPOSITION/REINSERTION/UPSIZE  05/27/2022    IR NEPHROSTOMY TUBE CHECK/CHANGE/REPOSITION/REINSERTION/UPSIZE  11/10/2023    IR NEPHROSTOMY TUBE CHECK/CHANGE/REPOSITION/REINSERTION/UPSIZE  2/9/2024    IR NEPHROSTOMY TUBE PLACEMENT  07/26/2019    IR NEPHROSTOMY TUBE PLACEMENT  05/27/2023    IR NEPHROURETERAL STENT CHECK/CHANGE/REPOSITION  04/06/2021    IR NEPHROURETERAL STENT CHECK/CHANGE/REPOSITION  06/04/2021    IR  NEPHROURETERAL STENT CHECK/CHANGE/REPOSITION  09/03/2021    IR NEPHROURETERAL STENT CHECK/CHANGE/REPOSITION  12/07/2021    IR NEPHROURETERAL STENT CHECK/CHANGE/REPOSITION  03/08/2022    IR NEPHROURETERAL STENT CHECK/CHANGE/REPOSITION  05/10/2022    IR NEPHROURETERAL STENT CHECK/CHANGE/REPOSITION  09/19/2022    IR PICC LINE  09/27/2019    IR PORT PLACEMENT  07/26/2019    IR PORT PLACEMENT      IR PORT REMOVAL  09/20/2019    OOPHORECTOMY Bilateral 2017    VA CYSTO BLADDER W/URETERAL CATHETERIZATION Right 03/30/2023    Procedure: CYSTOSCOPY RETROGRADE PYELOGRAM WITH exchange of STENT URETERAL;  Surgeon: Demetrius Lowe MD;  [facility];  Service: Urology    VA CYSTO BLADDER W/URETERAL CATHETERIZATION Bilateral 05/09/2023    Procedure: CYSTOSCOPY, BILATERAL RETROGRADE PYELOGRAM, WITH LEFT INSERTION STENT URETERAL, RIGHT URTERAL STENT EXCHANGE;  Surgeon: Nicola Hannah MD;  [facility];  Service: Urology    VA CYSTO BLADDER W/URETERAL CATHETERIZATION Bilateral 8/1/2023    Procedure: CYSTOSCOPY BILATERAL RETROGRADE  WITH REMOVAL BILATERAL  STENT URETERAL;  Surgeon: Demetrius Lowe MD;  [facility];  Service: Urology    VA CYSTO W/INSERT URETERAL STENT Right 03/30/2023    Procedure: EXCHANGE STENT URETERAL;  Surgeon: Demetrius Lowe MD;  [facility];  Service: Urology    VA INSJ TUNNELED CTR VAD W/SUBQ PORT AGE 5 YR/> Left 11/12/2019    Procedure: INSERTION VENOUS PORT ( PORT-A-CATH) IR;  Surgeon: Edilberto Guzman DO;  [facility];  Service: Interventional Radiology    VA LAPS ABD PRTM&OMENTUM DX W/WO SPEC BR/WA SPX N/A 12/19/2017    Procedure: LAPAROSCOPY DIAGNOSTIC;  Surgeon: Evgeny So MD;  [facility];  Service: Gynecology Oncology    VA LAPS W/RAD HYST W/BILAT LMPHADEC RMVL TUBE/OVARY N/A 12/19/2017    Procedure: HYSTERECTOMY LAPAROSCOPIC TOTAL (LTH) W/ ROBOTICS; BILATERAL SALPINGOOPHERECTOMY; LYMPH NODE DISSECTION; lysis of  adhesions;  Surgeon: Evgeny So MD;  [facility];  Service: Gynecology Oncology    REMOVAL URETERAL STENT Bilateral 8/1/2023    Procedure: REMOVAL STENT URETERAL;  Surgeon: Demetrius Lowe MD;  [facility];  Service: Urology    TONSILLECTOMY      US GUIDED BREAST BIOPSY RIGHT COMPLETE Right 06/28/2019       Family History   Problem Relation Age of Onset    Hyperlipidemia Mother     Heart disease Mother     Ovarian cancer Mother 50    Colon cancer Mother     Lymphoma Father     Breast cancer Sister 63    No Known Problems Brother     No Known Problems Brother     No Known Problems Maternal Grandmother     Bone cancer Maternal Grandfather     Uterine cancer Paternal Grandmother     No Known Problems Paternal Grandfather     No Known Problems Maternal Aunt     No Known Problems Maternal Aunt     No Known Problems Maternal Aunt     No Known Problems Maternal Aunt     No Known Problems Paternal Aunt     No Known Problems Paternal Aunt     No Known Problems Paternal Aunt     No Known Problems Paternal Aunt      Social History     Socioeconomic History    Marital status: Single     Spouse name: None    Number of children: None    Years of education: None    Highest education level: None   Occupational History    None   Tobacco Use    Smoking status: Never    Smokeless tobacco: Never   Vaping Use    Vaping status: Never Used   Substance and Sexual Activity    Alcohol use: Never    Drug use: Never    Sexual activity: Not Currently   Other Topics Concern    None   Social History Narrative    None     Social Determinants of Health     Financial Resource Strain: Low Risk  (9/26/2023)    Overall Financial Resource Strain (CARDIA)     Difficulty of Paying Living Expenses: Not very hard   Food Insecurity: No Food Insecurity (5/30/2023)    Hunger Vital Sign     Worried About Running Out of Food in the Last Year: Never true     Ran Out of Food in the Last Year: Never true   Transportation Needs: No  Transportation Needs (9/26/2023)    PRAPARE - Transportation     Lack of Transportation (Medical): No     Lack of Transportation (Non-Medical): No   Physical Activity: Not on file   Stress: Not on file   Social Connections: Not on file   Intimate Partner Violence: Not on file   Housing Stability: Low Risk  (5/30/2023)    Housing Stability Vital Sign     Unable to Pay for Housing in the Last Year: No     Number of Places Lived in the Last Year: 1     Unstable Housing in the Last Year: No     Living donors: sister wants to be evaluate     Current Outpatient Medications   Medication Sig Dispense Refill    ARIPiprazole (ABILIFY) 20 MG tablet Take 20 mg by mouth every morning      BD PosiFlush 0.9 % SOLN       calcitriol (ROCALTROL) 0.25 mcg capsule Take 1 capsule (0.25 mcg total) by mouth 3 (three) times a week 12 capsule 3    calcium acetate (PHOSLO) capsule Take 1 capsule (667 mg total) by mouth 2 (two) times a day with meals 180 capsule 3    Calcium-Magnesium-Vitamin D (CALCIUM 500 PO) Take 1 capsule by mouth daily at bedtime      cetirizine (ZyrTEC) 10 mg tablet Take 1 tablet (10 mg total) by mouth daily 30 tablet 1    cholecalciferol (VITAMIN D3) 1,000 units tablet Take 4 tablets (4,000 Units total) by mouth daily (Patient taking differently: Take 4,000 Units by mouth daily at bedtime) 90 tablet 0    Cyanocobalamin (VITAMIN B12 PO) Take 1 capsule by mouth daily after lunch      dronabinol (MARINOL) 2.5 mg capsule Take 1 capsule (2.5 mg total) by mouth 2 (two) times a day before meals 30 capsule 0    fluticasone (FLONASE) 50 mcg/act nasal spray 1 spray into each nostril daily 15.8 mL 0    folic acid (FOLVITE) 1 mg tablet Take 1 tablet (1 mg total) by mouth daily (Patient taking differently: Take 1 mg by mouth daily at bedtime)  0    furosemide (LASIX) 20 mg tablet TAKE 1 TABLET (20 MG TOTAL) BY MOUTH IF NEEDED (FOR INCREASED LEG SWELLING OR WEIGHT GAIN >2 LBS IN 1 DAY) 90 tablet 1    Multiple Vitamin (MULTIVITAMIN)  "tablet Take 1 tablet by mouth every morning      oxybutynin (DITROPAN XL) 15 MG 24 hr tablet Take 1 tablet (15 mg total) by mouth daily at bedtime 90 tablet 1    saccharomyces boulardii (FLORASTOR) 250 mg capsule Take 1 capsule (250 mg total) by mouth daily after lunch      senna (SENOKOT) 8.6 MG tablet Take 1 tablet (8.6 mg total) by mouth 2 (two) times a day as needed for constipation 60 tablet 0    sodium bicarbonate 650 mg tablet Take 1 tablet (650 mg total) by mouth 3 (three) times a day 270 tablet 3    venlafaxine (EFFEXOR-XR) 75 mg 24 hr capsule TAKE 3 CAPS DAILY      Vibegron (Gemtesa) 75 MG TABS Take 1 capsule by mouth every morning 90 tablet 3    omeprazole (PriLOSEC) 20 mg delayed release capsule Take 20 mg by mouth every morning (Patient not taking: Reported on 12/29/2023)      sodium chloride, PF, 0.9 % 10 mL by Intracatheter route daily Intracatheter flushing daily. May substitute prefilled syringe with normal saline 10 mL vials, 10 mL syringes, and 18 g blunt needles 300 mL 2    sodium chloride, PF, 0.9 % 10 mL by Intracatheter route daily for 120 doses Intracatheter flushing daily. May substitute prefilled syringe with normal saline 10 mL vials, 10 mL syringes, and 18 g blunt needles (Patient not taking: Reported on 1/29/2024) 300 mL 3    sodium chloride, PF, 0.9 % 10 mL by Intracatheter route daily for 120 doses Intracatheter flushing daily. May substitute prefilled syringe with normal saline 10 mL vials, 10 mL syringes, and 18 g blunt needles 300 mL 3    sodium chloride, PF, 0.9 % 10 mL by Intracatheter route daily for 90 doses 300 mL 2     No current facility-administered medications for this visit.     Cephalosporins    Physical Exam:    /60   Ht 4' 9\" (1.448 m)   LMP  (LMP Unknown)   BMI 30.08 kg/m²     General : NAD    HEENT: perrla, anicteric, no thrush, dental some pulled teeth otherwise appropriate  Cardiac:  RRR, S1, S2 normal,  soft murmur    Lung:  CTAB   Abdomen:  soft, " nontender, no ascites, midline surgical scar   femoral pulses (no bruit)   no edema   Neuro:no focal neuro deficits   Skin: tattoo no  Carotid bruit: no

## 2024-04-10 ENCOUNTER — OFFICE VISIT (OUTPATIENT)
Dept: UROLOGY | Facility: AMBULATORY SURGERY CENTER | Age: 69
End: 2024-04-10
Payer: COMMERCIAL

## 2024-04-10 VITALS
SYSTOLIC BLOOD PRESSURE: 120 MMHG | WEIGHT: 139 LBS | DIASTOLIC BLOOD PRESSURE: 72 MMHG | OXYGEN SATURATION: 98 % | BODY MASS INDEX: 30.08 KG/M2 | HEART RATE: 66 BPM

## 2024-04-10 DIAGNOSIS — N13.5 URETERAL STRICTURE: Primary | ICD-10-CM

## 2024-04-10 PROCEDURE — 99214 OFFICE O/P EST MOD 30 MIN: CPT | Performed by: UROLOGY

## 2024-04-10 NOTE — PROGRESS NOTES
4/10/2024    Ragini Melendez  1955  [id number]        Assessment  History of stage Ib endometrial carcinoma, status post chemotherapy, hysterectomy, and radiation, bilateral distal ureteral strictures, chronic kidney disease, indwelling nephrostomy tubes      Discussion  I again today had a lengthy discussion with Ragini as well as her Sister Nahomi via telephone regarding her distal ureteral strictures.  Fortunately she is SABAS with regards to her endometrial carcinoma.  She continues to follow with gynecological oncology with surveillance.  She is also been following with nephrology for her chronic kidney disease with her current creatinine of 2.11 with a GFR of 23 from February 2024.  Today we discussed the risk and benefits of exploratory laparotomy with ileal conduit creation.  First we discussed that surgery itself may not even be possible based on prior scarring and prior radiation.  This may not be known until the time of exploratory laparotomy.  Next we discussed if possible diverting the ureters into an ileal conduit as she continues to make urine.  Even if she were to go onto hemodialysis she may still produce urine and without a urinary diversion she would require nephrostomy tubes even with a renal transplant.  Next I discussed that I will check with her nephrologist, Dr. Bond, to ensure that if we leave her bladder in place but are able to perform a successful urinary diversion that we will not exclude her general as a renal recipient for transplant kidney.  At this time she is not ready to commit to the procedure.  She will return in the next 6 weeks for additional discussion.        History of Present Illness  68 y.o. female with a history of stage Ib endometrial carcinoma treated with chemo, surgery, and radiation.  She has developed bilateral distal ureteral strictures from the level of the pelvic brim down.  She still has her bladder in place.  She has occasional incontinence.  She states that  her left kidney produces more urine than the right.  Her recent creatinine is 2.11, however, her GFR is only 23 which makes her stage IV chronic kidney disease.  She is at potential risk for stage V kidney disease which would require renal replacement therapy.  Fortunately she has no evidence of disease from her stage T1b endometrial carcinoma.  She returns in follow-up today for additional discussion regarding the possibility of exploratory laparotomy with creation of a diverting ileal conduit.  Prior abdominal surgery includes hysterectomy as well as open gastric bypass.          AUA Symptom Score      Review of Systems  Review of Systems   Constitutional: Negative.    HENT: Negative.     Eyes: Negative.    Respiratory: Negative.     Cardiovascular: Negative.    Gastrointestinal: Negative.    Endocrine: Negative.    Genitourinary:         Per HPI   Musculoskeletal: Negative.    Skin: Negative.    Allergic/Immunologic: Negative.    Neurological: Negative.    Hematological: Negative.    Psychiatric/Behavioral: Negative.           Past Medical History  Past Medical History:   Diagnosis Date    Anemia     Anxiety     Cancer (HCC)     ENDOMETRIAL    Chemotherapy induced neutropenia (HCC) 8/30/2019    CKD (chronic kidney disease) stage 3, GFR 30-59 ml/min (HCC)     COVID     Fall 2022    Depression     DVT (deep venous thrombosis) (HCC) 07/19/2019    RIGHT LEG    Endometrial cancer (HCC) 12/2017    GERD (gastroesophageal reflux disease)     H/O gastric bypass     Hard to intubate     pt denies AS OF 7/25/23    History of chemotherapy     started 12/2021endometrial cancer- done 12/23/22    History of DVT in adulthood     RLE    History of transfusion 04/13/2023    Hyperglycemia     vx type 2 dm -- last assessed 4/1/14; resolved 11/7/17    Hyperlipidemia     Hypertension     in past- no meds at present    Iron deficiency anemia     iron infusions weekly    OAB (overactive bladder)     Port-A-Cath in place     Wears  glasses        Past Social History  Past Surgical History:   Procedure Laterality Date    ABDOMINAL SURGERY      GASTRIC BYPASS; 2001    BREAST BIOPSY Right 06/28/2019    core biopsy; benign    CHOLECYSTECTOMY      at the time of gastric bypass    COLONOSCOPY      CT NEEDLE BIOPSY LYMPH NODE  07/08/2019    FL GUIDED CENTRAL VENOUS ACCESS DEVICE INSERTION  11/12/2019    FL RETROGRADE PYELOGRAM  03/30/2023    FL RETROGRADE PYELOGRAM  05/09/2023    FL RETROGRADE PYELOGRAM  8/1/2023    GASTRIC BYPASS      HYSTERECTOMY Bilateral 2017    total abdominal with salpingo-oophorectomy    IR NEPHROSTOMY TO NEPHROURETERAL STENT  06/09/2022    IR NEPHROSTOMY TO NEPHROURETERAL STENT  12/20/2022    IR NEPHROSTOMY TO NEPHROURETERAL STENT  8/8/2023    IR NEPHROSTOMY TUBE CHECK/CHANGE/REPOSITION/REINSERTION/UPSIZE  05/27/2022    IR NEPHROSTOMY TUBE CHECK/CHANGE/REPOSITION/REINSERTION/UPSIZE  11/10/2023    IR NEPHROSTOMY TUBE CHECK/CHANGE/REPOSITION/REINSERTION/UPSIZE  2/9/2024    IR NEPHROSTOMY TUBE PLACEMENT  07/26/2019    IR NEPHROSTOMY TUBE PLACEMENT  05/27/2023    IR NEPHROURETERAL STENT CHECK/CHANGE/REPOSITION  04/06/2021    IR NEPHROURETERAL STENT CHECK/CHANGE/REPOSITION  06/04/2021    IR NEPHROURETERAL STENT CHECK/CHANGE/REPOSITION  09/03/2021    IR NEPHROURETERAL STENT CHECK/CHANGE/REPOSITION  12/07/2021    IR NEPHROURETERAL STENT CHECK/CHANGE/REPOSITION  03/08/2022    IR NEPHROURETERAL STENT CHECK/CHANGE/REPOSITION  05/10/2022    IR NEPHROURETERAL STENT CHECK/CHANGE/REPOSITION  09/19/2022    IR PICC LINE  09/27/2019    IR PORT PLACEMENT  07/26/2019    IR PORT PLACEMENT      IR PORT REMOVAL  09/20/2019    OOPHORECTOMY Bilateral 2017    RI CYSTO BLADDER W/URETERAL CATHETERIZATION Right 03/30/2023    Procedure: CYSTOSCOPY RETROGRADE PYELOGRAM WITH exchange of STENT URETERAL;  Surgeon: Demetrius Lowe MD;  [facility]: [facility];  Service: Urology    RI CYSTO BLADDER W/URETERAL CATHETERIZATION Bilateral 05/09/2023     Procedure: CYSTOSCOPY, BILATERAL RETROGRADE PYELOGRAM, WITH LEFT INSERTION STENT URETERAL, RIGHT URTERAL STENT EXCHANGE;  Surgeon: Nicola Hannah MD;  Location: [facility];  Service: Urology    NY CYSTO BLADDER W/URETERAL CATHETERIZATION Bilateral 8/1/2023    Procedure: CYSTOSCOPY BILATERAL RETROGRADE  WITH REMOVAL BILATERAL  STENT URETERAL;  Surgeon: Demetrius Lowe MD;  [facility]: [facility];  Service: Urology    NY CYSTO W/INSERT URETERAL STENT Right 03/30/2023    Procedure: EXCHANGE STENT URETERAL;  Surgeon: Demetrius Lowe MD;  [facility]: [facility];  Service: Urology    NY INSJ TUNNELED CTR VAD W/SUBQ PORT AGE 5 YR/> Left 11/12/2019    Procedure: INSERTION VENOUS PORT ( PORT-A-CATH) IR;  Surgeon: Edilberto Guzman DO;  [facility];  Service: Interventional Radiology    NY LAPS ABD PRTM&OMENTUM DX W/WO SPEC BR/WA SPX N/A 12/19/2017    Procedure: LAPAROSCOPY DIAGNOSTIC;  Surgeon: Evgeny So MD;  [facility]: [facility];  Service: Gynecology Oncology    NY LAPS W/RAD HYST W/BILAT LMPHADEC RMVL TUBE/OVARY N/A 12/19/2017    Procedure: HYSTERECTOMY LAPAROSCOPIC TOTAL (LTH) W/ ROBOTICS; BILATERAL SALPINGOOPHERECTOMY; LYMPH NODE DISSECTION; lysis of adhesions;  Surgeon: Evgeny So MD;  [facility];  Service: Gynecology Oncology    REMOVAL URETERAL STENT Bilateral 8/1/2023    Procedure: REMOVAL STENT URETERAL;  Surgeon: Demetrius Lowe MD;  [facility];  Service: Urology    TONSILLECTOMY      US GUIDED BREAST BIOPSY RIGHT COMPLETE Right 06/28/2019       Past Family History  Family History   Problem Relation Age of Onset    Hyperlipidemia Mother     Heart disease Mother     Ovarian cancer Mother 50    Colon cancer Mother     Lymphoma Father     Breast cancer Sister 63    No Known Problems Brother     No Known Problems Brother     No Known Problems Maternal Grandmother     Bone cancer Maternal Grandfather     Uterine cancer Paternal Grandmother     No Known  Problems Paternal Grandfather     No Known Problems Maternal Aunt     No Known Problems Maternal Aunt     No Known Problems Maternal Aunt     No Known Problems Maternal Aunt     No Known Problems Paternal Aunt     No Known Problems Paternal Aunt     No Known Problems Paternal Aunt     No Known Problems Paternal Aunt        Past Social history  Social History     Socioeconomic History    Marital status: Single     Spouse name: Not on file    Number of children: Not on file    Years of education: Not on file    Highest education level: Not on file   Occupational History    Not on file   Tobacco Use    Smoking status: Never    Smokeless tobacco: Never   Vaping Use    Vaping status: Never Used   Substance and Sexual Activity    Alcohol use: Never    Drug use: Never    Sexual activity: Not Currently   Other Topics Concern    Not on file   Social History Narrative    Not on file     Social Determinants of Health     Financial Resource Strain: Low Risk  (9/26/2023)    Overall Financial Resource Strain (CARDIA)     Difficulty of Paying Living Expenses: Not very hard   Food Insecurity: No Food Insecurity (5/30/2023)    Hunger Vital Sign     Worried About Running Out of Food in the Last Year: Never true     Ran Out of Food in the Last Year: Never true   Transportation Needs: No Transportation Needs (9/26/2023)    PRAPARE - Transportation     Lack of Transportation (Medical): No     Lack of Transportation (Non-Medical): No   Physical Activity: Not on file   Stress: Not on file   Social Connections: Not on file   Intimate Partner Violence: Not on file   Housing Stability: Low Risk  (5/30/2023)    Housing Stability Vital Sign     Unable to Pay for Housing in the Last Year: No     Number of Places Lived in the Last Year: 1     Unstable Housing in the Last Year: No       Current Medications  Current Outpatient Medications   Medication Sig Dispense Refill    ARIPiprazole (ABILIFY) 20 MG tablet Take 20 mg by mouth every morning       BD PosiFlush 0.9 % SOLN       calcitriol (ROCALTROL) 0.25 mcg capsule Take 1 capsule (0.25 mcg total) by mouth 3 (three) times a week 12 capsule 3    calcium acetate (PHOSLO) capsule Take 1 capsule (667 mg total) by mouth 2 (two) times a day with meals 180 capsule 3    Calcium-Magnesium-Vitamin D (CALCIUM 500 PO) Take 1 capsule by mouth daily at bedtime      cetirizine (ZyrTEC) 10 mg tablet Take 1 tablet (10 mg total) by mouth daily 30 tablet 1    cholecalciferol (VITAMIN D3) 1,000 units tablet Take 4 tablets (4,000 Units total) by mouth daily (Patient taking differently: Take 4,000 Units by mouth daily at bedtime) 90 tablet 0    Cyanocobalamin (VITAMIN B12 PO) Take 1 capsule by mouth daily after lunch      dronabinol (MARINOL) 2.5 mg capsule Take 1 capsule (2.5 mg total) by mouth 2 (two) times a day before meals 30 capsule 0    fluticasone (FLONASE) 50 mcg/act nasal spray 1 spray into each nostril daily 15.8 mL 0    folic acid (FOLVITE) 1 mg tablet Take 1 tablet (1 mg total) by mouth daily (Patient taking differently: Take 1 mg by mouth daily at bedtime)  0    furosemide (LASIX) 20 mg tablet TAKE 1 TABLET (20 MG TOTAL) BY MOUTH IF NEEDED (FOR INCREASED LEG SWELLING OR WEIGHT GAIN >2 LBS IN 1 DAY) 90 tablet 1    Multiple Vitamin (MULTIVITAMIN) tablet Take 1 tablet by mouth every morning      oxybutynin (DITROPAN XL) 15 MG 24 hr tablet Take 1 tablet (15 mg total) by mouth daily at bedtime 90 tablet 1    saccharomyces boulardii (FLORASTOR) 250 mg capsule Take 1 capsule (250 mg total) by mouth daily after lunch      senna (SENOKOT) 8.6 MG tablet Take 1 tablet (8.6 mg total) by mouth 2 (two) times a day as needed for constipation 60 tablet 0    sodium bicarbonate 650 mg tablet Take 1 tablet (650 mg total) by mouth 3 (three) times a day 270 tablet 3    sodium chloride, PF, 0.9 % 10 mL by Intracatheter route daily for 90 doses 300 mL 2    venlafaxine (EFFEXOR-XR) 75 mg 24 hr capsule TAKE 3 CAPS DAILY      Vibegron  "(Gemtesa) 75 MG TABS Take 1 capsule by mouth every morning 90 tablet 3    omeprazole (PriLOSEC) 20 mg delayed release capsule Take 20 mg by mouth every morning      sodium chloride, PF, 0.9 % 10 mL by Intracatheter route daily Intracatheter flushing daily. May substitute prefilled syringe with normal saline 10 mL vials, 10 mL syringes, and 18 g blunt needles 300 mL 2    sodium chloride, PF, 0.9 % 10 mL by Intracatheter route daily for 120 doses Intracatheter flushing daily. May substitute prefilled syringe with normal saline 10 mL vials, 10 mL syringes, and 18 g blunt needles 300 mL 3    sodium chloride, PF, 0.9 % 10 mL by Intracatheter route daily for 120 doses Intracatheter flushing daily. May substitute prefilled syringe with normal saline 10 mL vials, 10 mL syringes, and 18 g blunt needles 300 mL 3     No current facility-administered medications for this visit.       Allergies  Allergies   Allergen Reactions    Cephalosporins Rash     Which Cephalosporin reaction was to not specified; however, has tolerated Amoxicillin, Cefazolin, and Cefepime       Past Medical History, Social History, Family History, medications and allergies were reviewed.    Vitals  Vitals:    04/10/24 1317   BP: 120/72   BP Location: Left arm   Patient Position: Sitting   Cuff Size: Adult   Pulse: 66   SpO2: 98%   Weight: 63 kg (139 lb)       Physical Exam  Physical Exam    On examination she is in no acute distress.  Bilateral nephrostomy tubes are in place.  Well-healed supraumbilical midline scar from her open gastric bypass is noted.  Skin is warm.  Extremities without edema.  Neurologic is grossly intact and nonfocal    Results  No results found for: \"PSA\"  Lab Results   Component Value Date    GLUCOSE 219 (H) 12/19/2017    CALCIUM 8.8 02/16/2024     10/27/2017    K 4.2 02/16/2024    CO2 23 02/16/2024     02/16/2024    BUN 48 (H) 02/16/2024    CREATININE 2.11 (H) 02/16/2024     Lab Results   Component Value Date    WBC " 8.11 11/16/2023    HGB 9.8 (L) 11/16/2023    HCT 33.2 (L) 11/16/2023    MCV 95 11/16/2023     11/16/2023         Office Urine Dip  No results found for this or any previous visit (from the past 1 hour(s)).]

## 2024-04-10 NOTE — LETTER
April 10, 2024     Emely Yost DO  [address] [facility] [address]    Patient: Ragini Melendez   YOB: 1955   Date of Visit: 4/10/2024       Dear Dr. Yost:    Thank you for referring Ragini Melendez to me for evaluation. Below are my notes for this consultation.    If you have questions, please do not hesitate to call me. I look forward to following your patient along with you.         Sincerely,        Demetrius Lowe MD        CC: MD Beth Forde MD Frank Jeremy Tamarkin, MD  4/10/2024  2:22 PM  Sign when Signing Visit  4/10/2024    Ragini Melendez  1955  [id number]        Assessment  History of stage Ib endometrial carcinoma, status post chemotherapy, hysterectomy, and radiation, bilateral distal ureteral strictures, chronic kidney disease, indwelling nephrostomy tubes      Discussion  I again today had a lengthy discussion with Ragini as well as her Sister Nahomi via telephone regarding her distal ureteral strictures.  Fortunately she is SABAS with regards to her endometrial carcinoma.  She continues to follow with gynecological oncology with surveillance.  She is also been following with nephrology for her chronic kidney disease with her current creatinine of 2.11 with a GFR of 23 from February 2024.  Today we discussed the risk and benefits of exploratory laparotomy with ileal conduit creation.  First we discussed that surgery itself may not even be possible based on prior scarring and prior radiation.  This may not be known until the time of exploratory laparotomy.  Next we discussed if possible diverting the ureters into an ileal conduit as she continues to make urine.  Even if she were to go onto hemodialysis she may still produce urine and without a urinary diversion she would require nephrostomy tubes even with a renal transplant.  Next I discussed that I will check with her nephrologist, Dr. Bond, to ensure that if we leave her bladder  in place but are able to perform a successful urinary diversion that we will not exclude her general as a renal recipient for transplant kidney.  At this time she is not ready to commit to the procedure.  She will return in the next 6 weeks for additional discussion.        History of Present Illness  68 y.o. female with a history of stage Ib endometrial carcinoma treated with chemo, surgery, and radiation.  She has developed bilateral distal ureteral strictures from the level of the pelvic brim down.  She still has her bladder in place.  She has occasional incontinence.  She states that her left kidney produces more urine than the right.  Her recent creatinine is 2.11, however, her GFR is only 23 which makes her stage IV chronic kidney disease.  She is at potential risk for stage V kidney disease which would require renal replacement therapy.  Fortunately she has no evidence of disease from her stage T1b endometrial carcinoma.  She returns in follow-up today for additional discussion regarding the possibility of exploratory laparotomy with creation of a diverting ileal conduit.  Prior abdominal surgery includes hysterectomy as well as open gastric bypass.          AUA Symptom Score      Review of Systems  Review of Systems   Constitutional: Negative.    HENT: Negative.     Eyes: Negative.    Respiratory: Negative.     Cardiovascular: Negative.    Gastrointestinal: Negative.    Endocrine: Negative.    Genitourinary:         Per HPI   Musculoskeletal: Negative.    Skin: Negative.    Allergic/Immunologic: Negative.    Neurological: Negative.    Hematological: Negative.    Psychiatric/Behavioral: Negative.           Past Medical History  Past Medical History:   Diagnosis Date   • Anemia    • Anxiety    • Cancer (HCC)     ENDOMETRIAL   • Chemotherapy induced neutropenia (HCC) 8/30/2019   • CKD (chronic kidney disease) stage 3, GFR 30-59 ml/min (HCC)    • COVID     Fall 2022   • Depression    • DVT (deep venous  thrombosis) (HCC) 07/19/2019    RIGHT LEG   • Endometrial cancer (HCC) 12/2017   • GERD (gastroesophageal reflux disease)    • H/O gastric bypass    • Hard to intubate     pt denies AS OF 7/25/23   • History of chemotherapy     started 12/2021endometrial cancer- done 12/23/22   • History of DVT in adulthood     RLE   • History of transfusion 04/13/2023   • Hyperglycemia     vx type 2 dm -- last assessed 4/1/14; resolved 11/7/17   • Hyperlipidemia    • Hypertension     in past- no meds at present   • Iron deficiency anemia     iron infusions weekly   • OAB (overactive bladder)    • Port-A-Cath in place    • Wears glasses        Past Social History  Past Surgical History:   Procedure Laterality Date   • ABDOMINAL SURGERY      GASTRIC BYPASS; 2001   • BREAST BIOPSY Right 06/28/2019    core biopsy; benign   • CHOLECYSTECTOMY      at the time of gastric bypass   • COLONOSCOPY     • CT NEEDLE BIOPSY LYMPH NODE  07/08/2019   • FL GUIDED CENTRAL VENOUS ACCESS DEVICE INSERTION  11/12/2019   • FL RETROGRADE PYELOGRAM  03/30/2023   • FL RETROGRADE PYELOGRAM  05/09/2023   • FL RETROGRADE PYELOGRAM  8/1/2023   • GASTRIC BYPASS     • HYSTERECTOMY Bilateral 2017    total abdominal with salpingo-oophorectomy   • IR NEPHROSTOMY TO NEPHROURETERAL STENT  06/09/2022   • IR NEPHROSTOMY TO NEPHROURETERAL STENT  12/20/2022   • IR NEPHROSTOMY TO NEPHROURETERAL STENT  8/8/2023   • IR NEPHROSTOMY TUBE CHECK/CHANGE/REPOSITION/REINSERTION/UPSIZE  05/27/2022   • IR NEPHROSTOMY TUBE CHECK/CHANGE/REPOSITION/REINSERTION/UPSIZE  11/10/2023   • IR NEPHROSTOMY TUBE CHECK/CHANGE/REPOSITION/REINSERTION/UPSIZE  2/9/2024   • IR NEPHROSTOMY TUBE PLACEMENT  07/26/2019   • IR NEPHROSTOMY TUBE PLACEMENT  05/27/2023   • IR NEPHROURETERAL STENT CHECK/CHANGE/REPOSITION  04/06/2021   • IR NEPHROURETERAL STENT CHECK/CHANGE/REPOSITION  06/04/2021   • IR NEPHROURETERAL STENT CHECK/CHANGE/REPOSITION  09/03/2021   • IR NEPHROURETERAL STENT CHECK/CHANGE/REPOSITION   12/07/2021   • IR NEPHROURETERAL STENT CHECK/CHANGE/REPOSITION  03/08/2022   • IR NEPHROURETERAL STENT CHECK/CHANGE/REPOSITION  05/10/2022   • IR NEPHROURETERAL STENT CHECK/CHANGE/REPOSITION  09/19/2022   • IR PICC LINE  09/27/2019   • IR PORT PLACEMENT  07/26/2019   • IR PORT PLACEMENT     • IR PORT REMOVAL  09/20/2019   • OOPHORECTOMY Bilateral 2017   • IA CYSTO BLADDER W/URETERAL CATHETERIZATION Right 03/30/2023    Procedure: CYSTOSCOPY RETROGRADE PYELOGRAM WITH exchange of STENT URETERAL;  Surgeon: Demetrius Lowe MD;  [facility];  Service: Urology   • IA CYSTO BLADDER W/URETERAL CATHETERIZATION Bilateral 05/09/2023    Procedure: CYSTOSCOPY, BILATERAL RETROGRADE PYELOGRAM, WITH LEFT INSERTION STENT URETERAL, RIGHT URTERAL STENT EXCHANGE;  Surgeon: Nicola Hannah MD;  [facility];  Service: Urology   • IA CYSTO BLADDER W/URETERAL CATHETERIZATION Bilateral 8/1/2023    Procedure: CYSTOSCOPY BILATERAL RETROGRADE  WITH REMOVAL BILATERAL  STENT URETERAL;  Surgeon: Demetrius Lowe MD;  [facility];  Service: Urology   • IA CYSTO W/INSERT URETERAL STENT Right 03/30/2023    Procedure: EXCHANGE STENT URETERAL;  Surgeon: Demetrius Lowe MD;  [facility];  Service: Urology   • IA INSJ TUNNELED CTR VAD W/SUBQ PORT AGE 5 YR/> Left 11/12/2019    Procedure: INSERTION VENOUS PORT ( PORT-A-CATH) IR;  Surgeon: Edilberto Guzman DO;  [facility];  Service: Interventional Radiology   • IA LAPS ABD PRTM&OMENTUM DX W/WO SPEC BR/WA SPX N/A 12/19/2017    Procedure: LAPAROSCOPY DIAGNOSTIC;  Surgeon: Evgeny So MD;  [facility];  Service: Gynecology Oncology   • IA LAPS W/RAD HYST W/BILAT LMPHADEC RMVL TUBE/OVARY N/A 12/19/2017    Procedure: HYSTERECTOMY LAPAROSCOPIC TOTAL (LTH) W/ ROBOTICS; BILATERAL SALPINGOOPHERECTOMY; LYMPH NODE DISSECTION; lysis of adhesions;  Surgeon: Evgeny So MD;  [facility];  Service: Gynecology Oncology   •  REMOVAL URETERAL STENT Bilateral 8/1/2023    Procedure: REMOVAL STENT URETERAL;  Surgeon: Demetrius Lowe MD;  [facility];  Service: Urology   • TONSILLECTOMY     • US GUIDED BREAST BIOPSY RIGHT COMPLETE Right 06/28/2019       Past Family History  Family History   Problem Relation Age of Onset   • Hyperlipidemia Mother    • Heart disease Mother    • Ovarian cancer Mother 50   • Colon cancer Mother    • Lymphoma Father    • Breast cancer Sister 63   • No Known Problems Brother    • No Known Problems Brother    • No Known Problems Maternal Grandmother    • Bone cancer Maternal Grandfather    • Uterine cancer Paternal Grandmother    • No Known Problems Paternal Grandfather    • No Known Problems Maternal Aunt    • No Known Problems Maternal Aunt    • No Known Problems Maternal Aunt    • No Known Problems Maternal Aunt    • No Known Problems Paternal Aunt    • No Known Problems Paternal Aunt    • No Known Problems Paternal Aunt    • No Known Problems Paternal Aunt        Past Social history  Social History     Socioeconomic History   • Marital status: Single     Spouse name: Not on file   • Number of children: Not on file   • Years of education: Not on file   • Highest education level: Not on file   Occupational History   • Not on file   Tobacco Use   • Smoking status: Never   • Smokeless tobacco: Never   Vaping Use   • Vaping status: Never Used   Substance and Sexual Activity   • Alcohol use: Never   • Drug use: Never   • Sexual activity: Not Currently   Other Topics Concern   • Not on file   Social History Narrative   • Not on file     Social Determinants of Health     Financial Resource Strain: Low Risk  (9/26/2023)    Overall Financial Resource Strain (CARDIA)    • Difficulty of Paying Living Expenses: Not very hard   Food Insecurity: No Food Insecurity (5/30/2023)    Hunger Vital Sign    • Worried About Running Out of Food in the Last Year: Never true    • Ran Out of Food in the Last Year: Never  true   Transportation Needs: No Transportation Needs (9/26/2023)    PRAPARE - Transportation    • Lack of Transportation (Medical): No    • Lack of Transportation (Non-Medical): No   Physical Activity: Not on file   Stress: Not on file   Social Connections: Not on file   Intimate Partner Violence: Not on file   Housing Stability: Low Risk  (5/30/2023)    Housing Stability Vital Sign    • Unable to Pay for Housing in the Last Year: No    • Number of Places Lived in the Last Year: 1    • Unstable Housing in the Last Year: No       Current Medications  Current Outpatient Medications   Medication Sig Dispense Refill   • ARIPiprazole (ABILIFY) 20 MG tablet Take 20 mg by mouth every morning     • BD PosiFlush 0.9 % SOLN      • calcitriol (ROCALTROL) 0.25 mcg capsule Take 1 capsule (0.25 mcg total) by mouth 3 (three) times a week 12 capsule 3   • calcium acetate (PHOSLO) capsule Take 1 capsule (667 mg total) by mouth 2 (two) times a day with meals 180 capsule 3   • Calcium-Magnesium-Vitamin D (CALCIUM 500 PO) Take 1 capsule by mouth daily at bedtime     • cetirizine (ZyrTEC) 10 mg tablet Take 1 tablet (10 mg total) by mouth daily 30 tablet 1   • cholecalciferol (VITAMIN D3) 1,000 units tablet Take 4 tablets (4,000 Units total) by mouth daily (Patient taking differently: Take 4,000 Units by mouth daily at bedtime) 90 tablet 0   • Cyanocobalamin (VITAMIN B12 PO) Take 1 capsule by mouth daily after lunch     • dronabinol (MARINOL) 2.5 mg capsule Take 1 capsule (2.5 mg total) by mouth 2 (two) times a day before meals 30 capsule 0   • fluticasone (FLONASE) 50 mcg/act nasal spray 1 spray into each nostril daily 15.8 mL 0   • folic acid (FOLVITE) 1 mg tablet Take 1 tablet (1 mg total) by mouth daily (Patient taking differently: Take 1 mg by mouth daily at bedtime)  0   • furosemide (LASIX) 20 mg tablet TAKE 1 TABLET (20 MG TOTAL) BY MOUTH IF NEEDED (FOR INCREASED LEG SWELLING OR WEIGHT GAIN >2 LBS IN 1 DAY) 90 tablet 1   •  Multiple Vitamin (MULTIVITAMIN) tablet Take 1 tablet by mouth every morning     • oxybutynin (DITROPAN XL) 15 MG 24 hr tablet Take 1 tablet (15 mg total) by mouth daily at bedtime 90 tablet 1   • saccharomyces boulardii (FLORASTOR) 250 mg capsule Take 1 capsule (250 mg total) by mouth daily after lunch     • senna (SENOKOT) 8.6 MG tablet Take 1 tablet (8.6 mg total) by mouth 2 (two) times a day as needed for constipation 60 tablet 0   • sodium bicarbonate 650 mg tablet Take 1 tablet (650 mg total) by mouth 3 (three) times a day 270 tablet 3   • sodium chloride, PF, 0.9 % 10 mL by Intracatheter route daily for 90 doses 300 mL 2   • venlafaxine (EFFEXOR-XR) 75 mg 24 hr capsule TAKE 3 CAPS DAILY     • Vibegron (Gemtesa) 75 MG TABS Take 1 capsule by mouth every morning 90 tablet 3   • omeprazole (PriLOSEC) 20 mg delayed release capsule Take 20 mg by mouth every morning     • sodium chloride, PF, 0.9 % 10 mL by Intracatheter route daily Intracatheter flushing daily. May substitute prefilled syringe with normal saline 10 mL vials, 10 mL syringes, and 18 g blunt needles 300 mL 2   • sodium chloride, PF, 0.9 % 10 mL by Intracatheter route daily for 120 doses Intracatheter flushing daily. May substitute prefilled syringe with normal saline 10 mL vials, 10 mL syringes, and 18 g blunt needles 300 mL 3   • sodium chloride, PF, 0.9 % 10 mL by Intracatheter route daily for 120 doses Intracatheter flushing daily. May substitute prefilled syringe with normal saline 10 mL vials, 10 mL syringes, and 18 g blunt needles 300 mL 3     No current facility-administered medications for this visit.       Allergies  Allergies   Allergen Reactions   • Cephalosporins Rash     Which Cephalosporin reaction was to not specified; however, has tolerated Amoxicillin, Cefazolin, and Cefepime       Past Medical History, Social History, Family History, medications and allergies were reviewed.    Vitals  Vitals:    04/10/24 1317   BP: 120/72   BP  "Location: Left arm   Patient Position: Sitting   Cuff Size: Adult   Pulse: 66   SpO2: 98%   Weight: 63 kg (139 lb)       Physical Exam  Physical Exam    On examination she is in no acute distress.  Bilateral nephrostomy tubes are in place.  Well-healed supraumbilical midline scar from her open gastric bypass is noted.  Skin is warm.  Extremities without edema.  Neurologic is grossly intact and nonfocal    Results  No results found for: \"PSA\"  Lab Results   Component Value Date    GLUCOSE 219 (H) 12/19/2017    CALCIUM 8.8 02/16/2024     10/27/2017    K 4.2 02/16/2024    CO2 23 02/16/2024     02/16/2024    BUN 48 (H) 02/16/2024    CREATININE 2.11 (H) 02/16/2024     Lab Results   Component Value Date    WBC 8.11 11/16/2023    HGB 9.8 (L) 11/16/2023    HCT 33.2 (L) 11/16/2023    MCV 95 11/16/2023     11/16/2023         Office Urine Dip  No results found for this or any previous visit (from the past 1 hour(s)).]        "

## 2024-04-12 ENCOUNTER — APPOINTMENT (EMERGENCY)
Dept: RADIOLOGY | Facility: HOSPITAL | Age: 69
End: 2024-04-12
Attending: RADIOLOGY
Payer: COMMERCIAL

## 2024-04-12 ENCOUNTER — HOSPITAL ENCOUNTER (EMERGENCY)
Facility: HOSPITAL | Age: 69
Discharge: HOME | End: 2024-04-12
Attending: EMERGENCY MEDICINE
Payer: COMMERCIAL

## 2024-04-12 VITALS
SYSTOLIC BLOOD PRESSURE: 146 MMHG | HEART RATE: 95 BPM | DIASTOLIC BLOOD PRESSURE: 79 MMHG | RESPIRATION RATE: 16 BRPM | OXYGEN SATURATION: 99 % | TEMPERATURE: 97.8 F

## 2024-04-12 DIAGNOSIS — T83.022A NEPHROSTOMY TUBE DISPLACED (HCC): Primary | ICD-10-CM

## 2024-04-12 LAB
ANION GAP SERPL CALCULATED.3IONS-SCNC: 9 MMOL/L (ref 4–13)
BASOPHILS # BLD AUTO: 0.03 THOUSANDS/ÂΜL (ref 0–0.1)
BASOPHILS NFR BLD AUTO: 0 % (ref 0–1)
BUN SERPL-MCNC: 57 MG/DL (ref 5–25)
CALCIUM SERPL-MCNC: 8.5 MG/DL (ref 8.4–10.2)
CHLORIDE SERPL-SCNC: 105 MMOL/L (ref 96–108)
CO2 SERPL-SCNC: 23 MMOL/L (ref 21–32)
CREAT SERPL-MCNC: 2.05 MG/DL (ref 0.6–1.3)
EOSINOPHIL # BLD AUTO: 0.01 THOUSAND/ÂΜL (ref 0–0.61)
EOSINOPHIL NFR BLD AUTO: 0 % (ref 0–6)
ERYTHROCYTE [DISTWIDTH] IN BLOOD BY AUTOMATED COUNT: 13 % (ref 11.6–15.1)
GFR SERPL CREATININE-BSD FRML MDRD: 24 ML/MIN/1.73SQ M
GLUCOSE SERPL-MCNC: 88 MG/DL (ref 65–140)
HCT VFR BLD AUTO: 33 % (ref 34.8–46.1)
HGB BLD-MCNC: 10.5 G/DL (ref 11.5–15.4)
IMM GRANULOCYTES # BLD AUTO: 0.03 THOUSAND/UL (ref 0–0.2)
IMM GRANULOCYTES NFR BLD AUTO: 0 % (ref 0–2)
LYMPHOCYTES # BLD AUTO: 0.57 THOUSANDS/ÂΜL (ref 0.6–4.47)
LYMPHOCYTES NFR BLD AUTO: 8 % (ref 14–44)
MCH RBC QN AUTO: 29.1 PG (ref 26.8–34.3)
MCHC RBC AUTO-ENTMCNC: 31.8 G/DL (ref 31.4–37.4)
MCV RBC AUTO: 91 FL (ref 82–98)
MONOCYTES # BLD AUTO: 0.64 THOUSAND/ÂΜL (ref 0.17–1.22)
MONOCYTES NFR BLD AUTO: 9 % (ref 4–12)
NEUTROPHILS # BLD AUTO: 6 THOUSANDS/ÂΜL (ref 1.85–7.62)
NEUTS SEG NFR BLD AUTO: 83 % (ref 43–75)
NRBC BLD AUTO-RTO: 0 /100 WBCS
PLATELET # BLD AUTO: 211 THOUSANDS/UL (ref 149–390)
PMV BLD AUTO: 10.2 FL (ref 8.9–12.7)
POTASSIUM SERPL-SCNC: 4.3 MMOL/L (ref 3.5–5.3)
RBC # BLD AUTO: 3.61 MILLION/UL (ref 3.81–5.12)
SODIUM SERPL-SCNC: 137 MMOL/L (ref 135–147)
WBC # BLD AUTO: 7.28 THOUSAND/UL (ref 4.31–10.16)

## 2024-04-12 PROCEDURE — 50435 EXCHANGE NEPHROSTOMY CATH: CPT

## 2024-04-12 PROCEDURE — 96374 THER/PROPH/DIAG INJ IV PUSH: CPT

## 2024-04-12 PROCEDURE — 85025 COMPLETE CBC W/AUTO DIFF WBC: CPT

## 2024-04-12 PROCEDURE — C1769 GUIDE WIRE: HCPCS

## 2024-04-12 PROCEDURE — 50435 EXCHANGE NEPHROSTOMY CATH: CPT | Performed by: RADIOLOGY

## 2024-04-12 PROCEDURE — 50432 PLMT NEPHROSTOMY CATHETER: CPT

## 2024-04-12 PROCEDURE — 36415 COLL VENOUS BLD VENIPUNCTURE: CPT

## 2024-04-12 PROCEDURE — 99285 EMERGENCY DEPT VISIT HI MDM: CPT | Performed by: EMERGENCY MEDICINE

## 2024-04-12 PROCEDURE — 80048 BASIC METABOLIC PNL TOTAL CA: CPT

## 2024-04-12 PROCEDURE — 99284 EMERGENCY DEPT VISIT MOD MDM: CPT

## 2024-04-12 PROCEDURE — C1729 CATH, DRAINAGE: HCPCS

## 2024-04-12 RX ORDER — LIDOCAINE WITH 8.4% SOD BICARB 0.9%(10ML)
SYRINGE (ML) INJECTION AS NEEDED
Status: COMPLETED | OUTPATIENT
Start: 2024-04-12 | End: 2024-04-12

## 2024-04-12 RX ORDER — FENTANYL CITRATE 50 UG/ML
INJECTION, SOLUTION INTRAMUSCULAR; INTRAVENOUS AS NEEDED
Status: COMPLETED | OUTPATIENT
Start: 2024-04-12 | End: 2024-04-12

## 2024-04-12 RX ADMIN — Medication 3 ML: at 11:34

## 2024-04-12 RX ADMIN — Medication 2 ML: at 11:26

## 2024-04-12 RX ADMIN — IOHEXOL 14 ML: 350 INJECTION, SOLUTION INTRAVENOUS at 11:46

## 2024-04-12 RX ADMIN — FENTANYL CITRATE 50 MCG: 50 INJECTION INTRAMUSCULAR; INTRAVENOUS at 11:26

## 2024-04-12 NOTE — SEDATION DOCUMENTATION
Patient in IR with removed right nephrostomy tube and leaking left nephrostomy tube. New 10.2 F bojorquez-blank tube placed in the right side. Left tube exchanged with a 10.2 F bojorquez-blank tube. Both tubes placed to bag drainage. Sutures and dry dressings to both sites. Patient tolerated well. Next bilateral nephrostomy tube exchange to be performed in 3 months.

## 2024-04-12 NOTE — DISCHARGE INSTRUCTIONS
Next nephrostomy tube exchange will be scheduled 3 months from now.  Nephrostomy Tube Care     WHAT YOU NEED TO KNOW:   A nephrostomy tube is a catheter (thin plastic tube) that is inserted through your skin and into your kidney. The nephrostomy tube drains urine from your kidney into a collecting bag outside your body. You may need a nephrostomy tube when something is blocking the normal flow of urine. A nephrostomy tube may be used for a short or a long period of time. The nephrostomy tube comes out of your back, so you will need someone to help care for your nephrostomy tube.        DISCHARGE INSTRUCTIONS:      How to clean the skin around the nephrostomy tube and change the bandage:  Since the nephrostomy tube comes out of your back, you will not be able to care for it by yourself. Ask someone to follow the general directions below to check and care for your nephrostomy tube.   Gather the items you will need.          Disposable (single use) under-pad, and a clean washcloth  Plain soap, warm water, and new medical gloves  Sterile gauze bandages  Clear adhesive dressing or medical tape  Skin barrier  Protective skin film  Trash bag  Remove the old bandage, and check the tube entry site.    Have the patient lie on his side with the nephrostomy tube entry site facing up. Place the under-pad where it will catch drainage as you are working with the nephrostomy tube.   Wash your hands with soap and water. Put on new medical gloves.  Gently remove the old bandage, without pulling on the tube. Do this by holding the skin beside the tube with one hand. With the other hand, gently remove sticky tape and the skin barrier by pulling in the same direction as hair growth. Do not touch the side of the bandage that is placed over or around the tube. Throw the bandage and skin barrier away in a trash bag.  Look for signs of infection, such as skin redness and swelling. Report any skin changes to healthcare providers.  Clean the  tube entry site.    Hold the tube in place to keep it from being pulled out while you are cleaning around it.  You will need to clean the area twice. For the first cleaning, wet a new gauze bandage with soap and water.  Begin at the entry site of the tube. Wipe the skin in circles, moving away from the entry site. Remove blood and any other material with the gauze. Do this as often as needed. Use a new gauze bandage each time you clean the area, moving away from the entry site.   For the second cleaning, wet a new gauze bandage with water. Begin at the entry site of the tube. Wipe the skin in circles, moving away from the entry site. Use a new gauze bandage each time you clean the area, moving away from the entry site.   Gently pat the skin with a clean washcloth to dry it.    Apply the skin barrier and bandages.    Roll up a bandage to make it thick, and place it under  the place where the tube enters the skin. Place it to support the tube, and stop it from kinking or bending. Tape the bandage in place, and apply more bandages if directed by a healthcare provider.   Bring the tubing forward to the front and tape it to the skin. Do not stretch the tube tight, because this may pull the nephrostomy tube out.  How often to change the bandage.  Change the bandage around the tube, every other day. If your bandages  get dirty or wet, change them right away, and as often as needed. If your nephrostomy tube is to be used for a long period of time, the tube needs to be changed every 2 to 3 months. Healthcare providers will tell you when you need to make an appointment to have your tube changed.     How to care for the urine drainage bag:   Ask if you need to measure and write down how much urine is in the bag before you empty it. Drain urine out of the drainage bag when it is ½ to ? full. Open the spout at the bottom of the bag to empty the urine into the toilet.   You may need to detach the drainage bag from the nephrostomy  tube to change it.. If so, attach a new drainage bag tightly to the nephrostomy tube.     How to prevent problems with your nephrostomy tube:   Change bandages, directed.  This helps to prevent infection. Throw away or clean your drainage bag as directed by your healthcare provider.    Wipe the connecting ends of the drainage bag with alcohol before you reconnect the bag to the tube.  This helps prevent infection.     Keep the tube taped to your skin and connected to a drainage bag placed below the level of your kidneys.  This helps prevent urine from backing up into your kidneys. You may wear a small drainage bag strapped to your leg to let you move around more easily.    Check the catheter to be sure it is in place after you change your clothes or do other activities.  Do not wear tight clothing over the tube. Place the tubing over your thigh rather than under it when you are sitting down. Be sure that nothing is pulling on the nephrostomy tube when you move around.    Change positions if you see little or no urine in your drainage bag.  Check to see if the urine tube is twisted or bent. Be sure that you are not sitting or lying on the tube. If there are no kinks and there is little or no urine in the drainage bag, tell your healthcare provider.    Flush out the tube as directed. Some tubes get flushed one time a day with 10 mls of NSS You will be given a prescription for the flushes.  To flush the nephrostomy tube, clean both connections with alcohol swap. Twist off the drainage bag tube and twist the saline syringe into the nephrostomy tube and flush briskly. Remove the syringe and twist the drainage bag tube back into the nephrostomy tube.  Keep the site covered while you shower.  Tape a piece of clear adhesive plastic over the dressing to keep it dry while you shower. Do not take tub baths.    Contact Interventional Radiology at [phone] ([facility] PATIENTS: Contact Interventional Radiology at [phone])  ([facility] PATIENTS: Contact Interventional Radiology at [phone]) if:  The skin around the nephrostomy tube is red, swollen, itches, or has a rash.   You have a fever greater than 101 or chills.  You have lower back or hip pain.  There are changes in how your urine looks or smells.  You have little or no urine draining from the nephrostomy tube.   You have nausea and are vomiting.  The black bernardo on your tube has moved, or the tube is longer than when it was put in.   You have questions or concerns about your condition or care.  The nephrostomy tube comes out completely.   There is blood, pus, or a bad smell coming from the place where the tube enters your skin.  Urine is leaking around the tube.        The following pharmacies carry the flush syringes.       [facility]                     [facility]                         [facility]       [address]                     [address]                    [phone]  [place]                       [place]  Phone [phone]            Phone [phone]                 [facility]                                                                                                   [phone]  [facility]             [facility]                             [facility] [address] [facility] [facility]   [place]                                 [phone]  Phone [phone]            Phone [phone]    [facility]                                                                         [facility] [phone]  [address]  [place]   Phone [phone]

## 2024-04-12 NOTE — ED ATTENDING ATTESTATION
4/12/2024  I, Mi Valdez MD, saw and evaluated the patient. I have discussed the patient with the resident/non-physician practitioner and agree with the resident's/non-physician practitioner's findings, Plan of Care, and MDM as documented in the resident's/non-physician practitioner's note, except where noted. All available labs and Radiology studies were reviewed.  I was present for key portions of any procedure(s) performed by the resident/non-physician practitioner and I was immediately available to provide assistance.       At this point I agree with the current assessment done in the Emergency Department.  I have conducted an independent evaluation of this patient a history and physical is as follows:    Patient is a 68-year-old female who presents to the emergency department after unintentional displacement/dislodgment of right nephrostomy tube.  She has had these chronically in position with exchanges every few months secondary to ureteral stricture as a result of pelvic radiation for endometrial CA.    She has otherwise felt well recently without any fevers, nausea, vomiting or change in urine output.    ED Course     Hemoglobin slightly higher than that on other recent testing (no worsening of anemia).  Creatinine stable.    Dr. Schwarz contacted Dr. Cardenas from interventional radiology and arranged for her to undergo stent placement today.    New right nephrostomy tube placed and left tube exchanged as well.  Patient felt well afterwards and was ambulatory without difficulty.  She denied any lightheadedness or dizziness feeling well for discharge.    Critical Care Time  Procedures

## 2024-04-12 NOTE — ED PROVIDER NOTES
History  Chief Complaint   Patient presents with    Medical Problem     Arrives after inadvertently pulling out right-sided nephrostomy tube. Dried blood noted to site. Patient brought tube with her.      68 y.o. female with PMH of endometrial cancer s/p surgery in 2017, recurrence on 2019 then had chemotherapy and radiotherapy, CKD stage IV after having ureteric stricture now is on nephrostomy tube placed on both sides, who came in today for accidentally pulling out of her right nephrostomy tube after getting stuck with something this morning.Her tubes were placed on May 2023 and are being replaced every 3 months. Denies fever, chills, pain in the tube sites, abdomen, urinary symptoms. She is AAOx3. Following nephrology as outpatient for the evaluation of having kidney transplant. Both tube sites are patent and leaking, but denies pain.      History provided by:  Patient   used: No        Prior to Admission Medications   Prescriptions Last Dose Informant Patient Reported? Taking?   ARIPiprazole (ABILIFY) 20 MG tablet  Self Yes No   Sig: Take 20 mg by mouth every morning   BD PosiFlush 0.9 % SOLN  Self Yes No   Calcium-Magnesium-Vitamin D (CALCIUM 500 PO)  Self Yes No   Sig: Take 1 capsule by mouth daily at bedtime   Cyanocobalamin (VITAMIN B12 PO)  Self Yes No   Sig: Take 1 capsule by mouth daily after lunch   Multiple Vitamin (MULTIVITAMIN) tablet  Self Yes No   Sig: Take 1 tablet by mouth every morning   Vibegron (Gemtesa) 75 MG TABS  Self No No   Sig: Take 1 capsule by mouth every morning   calcitriol (ROCALTROL) 0.25 mcg capsule  Self No No   Sig: Take 1 capsule (0.25 mcg total) by mouth 3 (three) times a week   calcium acetate (PHOSLO) capsule  Self No No   Sig: Take 1 capsule (667 mg total) by mouth 2 (two) times a day with meals   cetirizine (ZyrTEC) 10 mg tablet  Self No No   Sig: Take 1 tablet (10 mg total) by mouth daily   cholecalciferol (VITAMIN D3) 1,000 units tablet  Self No No    Sig: Take 4 tablets (4,000 Units total) by mouth daily   Patient taking differently: Take 4,000 Units by mouth daily at bedtime   dronabinol (MARINOL) 2.5 mg capsule  Self No No   Sig: Take 1 capsule (2.5 mg total) by mouth 2 (two) times a day before meals   fluticasone (FLONASE) 50 mcg/act nasal spray  Self No No   Si spray into each nostril daily   folic acid (FOLVITE) 1 mg tablet  Self No No   Sig: Take 1 tablet (1 mg total) by mouth daily   Patient taking differently: Take 1 mg by mouth daily at bedtime   furosemide (LASIX) 20 mg tablet  Self No No   Sig: TAKE 1 TABLET (20 MG TOTAL) BY MOUTH IF NEEDED (FOR INCREASED LEG SWELLING OR WEIGHT GAIN >2 LBS IN 1 DAY)   omeprazole (PriLOSEC) 20 mg delayed release capsule  Self Yes No   Sig: Take 20 mg by mouth every morning   oxybutynin (DITROPAN XL) 15 MG 24 hr tablet  Self No No   Sig: Take 1 tablet (15 mg total) by mouth daily at bedtime   saccharomyces boulardii (FLORASTOR) 250 mg capsule  Self No No   Sig: Take 1 capsule (250 mg total) by mouth daily after lunch   senna (SENOKOT) 8.6 MG tablet  Self No No   Sig: Take 1 tablet (8.6 mg total) by mouth 2 (two) times a day as needed for constipation   sodium bicarbonate 650 mg tablet  Self No No   Sig: Take 1 tablet (650 mg total) by mouth 3 (three) times a day   sodium chloride, PF, 0.9 %  Self No No   Sig: 10 mL by Intracatheter route daily Intracatheter flushing daily. May substitute prefilled syringe with normal saline 10 mL vials, 10 mL syringes, and 18 g blunt needles   sodium chloride, PF, 0.9 %  Self No No   Sig: 10 mL by Intracatheter route daily for 120 doses Intracatheter flushing daily. May substitute prefilled syringe with normal saline 10 mL vials, 10 mL syringes, and 18 g blunt needles   sodium chloride, PF, 0.9 %  Self No No   Sig: 10 mL by Intracatheter route daily for 120 doses Intracatheter flushing daily. May substitute prefilled syringe with normal saline 10 mL vials, 10 mL syringes, and 18 g  blunt needles   sodium chloride, PF, 0.9 %  Self No No   Sig: 10 mL by Intracatheter route daily for 90 doses   venlafaxine (EFFEXOR-XR) 75 mg 24 hr capsule  Self Yes No   Sig: TAKE 3 CAPS DAILY      Facility-Administered Medications: None       Past Medical History:   Diagnosis Date    Anemia     Anxiety     Cancer (HCC)     ENDOMETRIAL    Chemotherapy induced neutropenia (HCC) 8/30/2019    CKD (chronic kidney disease) stage 3, GFR 30-59 ml/min (HCC)     COVID     Fall 2022    Depression     DVT (deep venous thrombosis) (HCC) 07/19/2019    RIGHT LEG    Endometrial cancer (HCC) 12/2017    GERD (gastroesophageal reflux disease)     H/O gastric bypass     Hard to intubate     pt denies AS OF 7/25/23    History of chemotherapy     started 12/2021endometrial cancer- done 12/23/22    History of DVT in adulthood     RLE    History of transfusion 04/13/2023    Hyperglycemia     vx type 2 dm -- last assessed 4/1/14; resolved 11/7/17    Hyperlipidemia     Hypertension     in past- no meds at present    Iron deficiency anemia     iron infusions weekly    OAB (overactive bladder)     Port-A-Cath in place     Wears glasses        Past Surgical History:   Procedure Laterality Date    ABDOMINAL SURGERY      GASTRIC BYPASS; 2001    BREAST BIOPSY Right 06/28/2019    core biopsy; benign    CHOLECYSTECTOMY      at the time of gastric bypass    COLONOSCOPY      CT NEEDLE BIOPSY LYMPH NODE  07/08/2019    FL GUIDED CENTRAL VENOUS ACCESS DEVICE INSERTION  11/12/2019    FL RETROGRADE PYELOGRAM  03/30/2023    FL RETROGRADE PYELOGRAM  05/09/2023    FL RETROGRADE PYELOGRAM  8/1/2023    GASTRIC BYPASS      HYSTERECTOMY Bilateral 2017    total abdominal with salpingo-oophorectomy    IR NEPHROSTOMY TO NEPHROURETERAL STENT  06/09/2022    IR NEPHROSTOMY TO NEPHROURETERAL STENT  12/20/2022    IR NEPHROSTOMY TO NEPHROURETERAL STENT  8/8/2023    IR NEPHROSTOMY TUBE CHECK/CHANGE/REPOSITION/REINSERTION/UPSIZE  05/27/2022    IR NEPHROSTOMY TUBE  CHECK/CHANGE/REPOSITION/REINSERTION/UPSIZE  11/10/2023    IR NEPHROSTOMY TUBE CHECK/CHANGE/REPOSITION/REINSERTION/UPSIZE  2/9/2024    IR NEPHROSTOMY TUBE CHECK/CHANGE/REPOSITION/REINSERTION/UPSIZE  4/12/2024    IR NEPHROSTOMY TUBE PLACEMENT  07/26/2019    IR NEPHROSTOMY TUBE PLACEMENT  05/27/2023    IR NEPHROURETERAL STENT CHECK/CHANGE/REPOSITION  04/06/2021    IR NEPHROURETERAL STENT CHECK/CHANGE/REPOSITION  06/04/2021    IR NEPHROURETERAL STENT CHECK/CHANGE/REPOSITION  09/03/2021    IR NEPHROURETERAL STENT CHECK/CHANGE/REPOSITION  12/07/2021    IR NEPHROURETERAL STENT CHECK/CHANGE/REPOSITION  03/08/2022    IR NEPHROURETERAL STENT CHECK/CHANGE/REPOSITION  05/10/2022    IR NEPHROURETERAL STENT CHECK/CHANGE/REPOSITION  09/19/2022    IR PICC LINE  09/27/2019    IR PORT PLACEMENT  07/26/2019    IR PORT PLACEMENT      IR PORT REMOVAL  09/20/2019    OOPHORECTOMY Bilateral 2017    MD CYSTO BLADDER W/URETERAL CATHETERIZATION Right 03/30/2023    Procedure: CYSTOSCOPY RETROGRADE PYELOGRAM WITH exchange of STENT URETERAL;  Surgeon: Demetrius Lowe MD;  [facility];  Service: Urology    MD CYSTO BLADDER W/URETERAL CATHETERIZATION Bilateral 05/09/2023    Procedure: CYSTOSCOPY, BILATERAL RETROGRADE PYELOGRAM, WITH LEFT INSERTION STENT URETERAL, RIGHT URTERAL STENT EXCHANGE;  Surgeon: Nicola Hannah MD;  [facility];  Service: Urology    MD CYSTO BLADDER W/URETERAL CATHETERIZATION Bilateral 8/1/2023    Procedure: CYSTOSCOPY BILATERAL RETROGRADE  WITH REMOVAL BILATERAL  STENT URETERAL;  Surgeon: Demetrius Lowe MD;  [facility];  Service: Urology    MD CYSTO W/INSERT URETERAL STENT Right 03/30/2023    Procedure: EXCHANGE STENT URETERAL;  Surgeon: Demetrius Lowe MD;  [facility];  Service: Urology    MD INSJ TUNNELED CTR VAD W/SUBQ PORT AGE 5 YR/> Left 11/12/2019    Procedure: INSERTION VENOUS PORT ( PORT-A-CATH) IR;  Surgeon: Edilberto Guzman DO;  [facility];  Service: Interventional Radiology    TN LAPS ABD PRTM&OMENTUM DX W/WO SPEC BR/WA SPX N/A 12/19/2017    Procedure: LAPAROSCOPY DIAGNOSTIC;  Surgeon: Evgeny oS MD;  [facility];  Service: Gynecology Oncology    TN LAPS W/RAD HYST W/BILAT LMPHADEC RMVL TUBE/OVARY N/A 12/19/2017    Procedure: HYSTERECTOMY LAPAROSCOPIC TOTAL (LTH) W/ ROBOTICS; BILATERAL SALPINGOOPHERECTOMY; LYMPH NODE DISSECTION; lysis of adhesions;  Surgeon: Evgeny So MD;  [facility];  Service: Gynecology Oncology    REMOVAL URETERAL STENT Bilateral 8/1/2023    Procedure: REMOVAL STENT URETERAL;  Surgeon: Demetrius Lowe MD;  [facility];  Service: Urology    TONSILLECTOMY      US GUIDED BREAST BIOPSY RIGHT COMPLETE Right 06/28/2019       Family History   Problem Relation Age of Onset    Hyperlipidemia Mother     Heart disease Mother     Ovarian cancer Mother 50    Colon cancer Mother     Lymphoma Father     Breast cancer Sister 63    No Known Problems Brother     No Known Problems Brother     No Known Problems Maternal Grandmother     Bone cancer Maternal Grandfather     Uterine cancer Paternal Grandmother     No Known Problems Paternal Grandfather     No Known Problems Maternal Aunt     No Known Problems Maternal Aunt     No Known Problems Maternal Aunt     No Known Problems Maternal Aunt     No Known Problems Paternal Aunt     No Known Problems Paternal Aunt     No Known Problems Paternal Aunt     No Known Problems Paternal Aunt      I have reviewed and agree with the history as documented.    E-Cigarette/Vaping    E-Cigarette Use Never User      E-Cigarette/Vaping Substances    Nicotine No     THC No     CBD No     Flavoring No     Other No     Unknown No      Social History     Tobacco Use    Smoking status: Never    Smokeless tobacco: Never   Vaping Use    Vaping status: Never Used   Substance Use Topics    Alcohol use: Never    Drug use: Never        Review of Systems   Constitutional:   Negative for activity change, appetite change, chills and fever.   HENT:  Negative for congestion, sore throat, trouble swallowing and voice change.    Eyes:  Negative for visual disturbance.   Respiratory:  Negative for cough and shortness of breath.    Cardiovascular:  Negative for chest pain and leg swelling.   Gastrointestinal:  Negative for abdominal pain, constipation, diarrhea, nausea and vomiting.   Endocrine: Negative for polyuria.   Genitourinary:  Negative for decreased urine volume, difficulty urinating, dysuria, enuresis, flank pain, frequency, pelvic pain and urgency.   Musculoskeletal:  Negative for back pain.   Skin:  Positive for wound (nephrostomy tubes opening on lower back). Negative for color change, pallor and rash.   Neurological:  Negative for dizziness, seizures, weakness, light-headedness and numbness.   Psychiatric/Behavioral:  Negative for agitation, behavioral problems, confusion and self-injury. The patient is not nervous/anxious.        Physical Exam  ED Triage Vitals   Temperature Pulse Respirations Blood Pressure SpO2   04/12/24 0927 04/12/24 0927 04/12/24 0927 04/12/24 0927 04/12/24 0927   97.8 °F (36.6 °C) (!) 47 16 128/60 99 %      Temp Source Heart Rate Source Patient Position - Orthostatic VS BP Location FiO2 (%)   04/12/24 0927 04/12/24 0927 04/12/24 0927 04/12/24 0927 04/12/24 1123   Oral Monitor Sitting Right arm 21      Pain Score       --                    Orthostatic Vital Signs  Vitals:    04/12/24 1123 04/12/24 1127 04/12/24 1132 04/12/24 1137   BP: 130/78 131/79 150/81 146/79   Pulse: 80 87 97 95   Patient Position - Orthostatic VS:           Physical Exam  Vitals and nursing note reviewed.   Constitutional:       General: She is not in acute distress.     Appearance: Normal appearance. She is not ill-appearing.   HENT:      Head: Normocephalic.      Nose: Nose normal. No congestion or rhinorrhea.      Mouth/Throat:      Mouth: Mucous membranes are moist.      Pharynx:  Oropharynx is clear. No oropharyngeal exudate or posterior oropharyngeal erythema.   Eyes:      Extraocular Movements: Extraocular movements intact.      Conjunctiva/sclera: Conjunctivae normal.      Pupils: Pupils are equal, round, and reactive to light.   Cardiovascular:      Rate and Rhythm: Normal rate and regular rhythm.      Pulses: Normal pulses.      Heart sounds: Normal heart sounds. No murmur heard.  Pulmonary:      Effort: Pulmonary effort is normal. No respiratory distress.      Breath sounds: Normal breath sounds.   Abdominal:      General: Bowel sounds are normal. There is no distension.      Palpations: Abdomen is soft.      Tenderness: There is no abdominal tenderness. There is no right CVA tenderness, left CVA tenderness or guarding.   Musculoskeletal:         General: No swelling, deformity or signs of injury. Normal range of motion.      Cervical back: Normal range of motion and neck supple. No rigidity.      Right lower leg: No edema.      Left lower leg: No edema.   Skin:     Coloration: Skin is not pale.      Findings: No bruising, erythema or lesion.      Comments: Dried blood over the right nephrostomy tube site   Neurological:      Mental Status: She is alert and oriented to person, place, and time. Mental status is at baseline.   Psychiatric:         Mood and Affect: Mood normal.         Behavior: Behavior normal.         Thought Content: Thought content normal.         ED Medications  Medications   lidocaine 1% buffered (3 mL Infiltration Given 4/12/24 1134)   fentaNYL injection (50 mcg Intravenous Given 4/12/24 1126)   iohexol (OMNIPAQUE) 350 MG/ML injection (SINGLE-DOSE) 14 mL (14 mL Other Given 4/12/24 1146)       Diagnostic Studies  Results Reviewed       Procedure Component Value Units Date/Time    Basic metabolic panel [[id number]]  (Abnormal) Collected: 04/12/24 1020    Lab Status: Final result Specimen: Blood from Arm, Left Updated: 04/12/24 1058     Sodium 137 mmol/L      Potassium  4.3 mmol/L      Chloride 105 mmol/L      CO2 23 mmol/L      ANION GAP 9 mmol/L      BUN 57 mg/dL      Creatinine 2.05 mg/dL      Glucose 88 mg/dL      Calcium 8.5 mg/dL      eGFR 24 ml/min/1.73sq m     Narrative:      National Kidney Disease Foundation guidelines for Chronic Kidney Disease (CKD):     Stage 1 with normal or high GFR (GFR > 90 mL/min/1.73 square meters)    Stage 2 Mild CKD (GFR = 60-89 mL/min/1.73 square meters)    Stage 3A Moderate CKD (GFR = 45-59 mL/min/1.73 square meters)    Stage 3B Moderate CKD (GFR = 30-44 mL/min/1.73 square meters)    Stage 4 Severe CKD (GFR = 15-29 mL/min/1.73 square meters)    Stage 5 End Stage CKD (GFR <15 mL/min/1.73 square meters)  Note: GFR calculation is accurate only with a steady state creatinine    CBC and differential [[id number]]  (Abnormal) Collected: 04/12/24 1020    Lab Status: Final result Specimen: Blood from Arm, Left Updated: 04/12/24 1048     WBC 7.28 Thousand/uL      RBC 3.61 Million/uL      Hemoglobin 10.5 g/dL      Hematocrit 33.0 %      MCV 91 fL      MCH 29.1 pg      MCHC 31.8 g/dL      RDW 13.0 %      MPV 10.2 fL      Platelets 211 Thousands/uL      nRBC 0 /100 WBCs      Segmented % 83 %      Immature Grans % 0 %      Lymphocytes % 8 %      Monocytes % 9 %      Eosinophils Relative 0 %      Basophils Relative 0 %      Absolute Neutrophils 6.00 Thousands/µL      Absolute Immature Grans 0.03 Thousand/uL      Absolute Lymphocytes 0.57 Thousands/µL      Absolute Monocytes 0.64 Thousand/µL      Eosinophils Absolute 0.01 Thousand/µL      Basophils Absolute 0.03 Thousands/µL                    IR nephrostomy tube check/change/reposition/reinsertion/upsize   Final Result by Leena Cardenas MD (04/12 2079)   Impression:   1. Successful replacement of right nephrostomy tube as described above.   2. Successful  exchange of left therapeutic 10 Bangladeshi nephrostomy tube.               Workstation performed: [id number]               Procedures  Procedures      ED  Course  ED Course as of 04/12/24 1223   Fri Apr 12, 2024   1010 Reached out to IR Dr. Cardenas and he replied back that he will do replace it today, no need of admission   1202 Both nephrostomy tubes are placed successfully without any complications                             SBIRT 22yo+      Flowsheet Row Most Recent Value   Initial Alcohol Screen: US AUDIT-C     1. How often do you have a drink containing alcohol? 0 Filed at: 04/12/2024 0928   2. How many drinks containing alcohol do you have on a typical day you are drinking?  0 Filed at: 04/12/2024 0928   3a. Male UNDER 65: How often do you have five or more drinks on one occasion? 0 Filed at: 04/12/2024 0928   3b. FEMALE Any Age, or MALE 65+: How often do you have 4 or more drinks on one occassion? 0 Filed at: 04/12/2024 0928   Audit-C Score 0 Filed at: 04/12/2024 0928   VALERIE: How many times in the past year have you...    Used an illegal drug or used a prescription medication for non-medical reasons? Never Filed at: 04/12/2024 0928                  Medical Decision Making  68 y.o. female with PMH of endometrial cancer s/s surgery, chemo, radiation, also has CKD stage IV on nephrostomy tubes bilaterally, came in today for replacement of her right nephrostomy tube which got pulled out accidentally this morning    Reached out to IR Dr. Cardenas and replaced both nephrostomy tubes    CBC shows Hb 10.5, CMP shows creatinine 2.05  IR nephrostomy tube check/replace:  Impression: 1. Successful replacement of right nephrostomy tube as described above. 2. Successful exchange of left therapeutic 10 [place] nephrostomy tube.    Patient feels comfortable and stable enough after the procedure to go back home.    Amount and/or Complexity of Data Reviewed  Labs: ordered.     Details: CBC, CMP  Radiology: ordered.     Details: IR Nephrostomy tube check/Place          Disposition  Final diagnoses:   Nephrostomy tube displaced (HCC)     Time reflects when diagnosis was  documented in both MDM as applicable and the Disposition within this note       Time User Action Codes Description Comment    4/12/2024 12:20 PM Komal Schwarz Add [T83.022A] Nephrostomy tube displaced (HCC)           ED Disposition       ED Disposition   Discharge    Condition   Stable    Date/Time   Fri Apr 12, 2024 1219    Comment   Ragini Melendez discharge to home/self care.                   Follow-up Information       Follow up With Specialties Details Why Contact Info Additional Information    Emely Yost, DO Family Medicine In 1 week As needed [address] [facility] [address]  [phone]       [facility] Emergency Department Emergency Medicine  If symptoms worsen [address] [place] [id number]  [phone] [facility] Emergency Department, [address] [place] [address], [phone]            Patient's Medications   Discharge Prescriptions    No medications on file     No discharge procedures on file.    PDMP Review         Value Time User    PDMP Reviewed  Yes 8/26/2022  4:42 PM Debo Renteria PA-C             ED Provider  Attending physically available and evaluated Ragini Melendez. I managed the patient along with the ED Attending.    Electronically Signed by           Komal Schwarz MD  04/12/24 3829

## 2024-04-12 NOTE — BRIEF OP NOTE (RAD/CATH)
INTERVENTIONAL RADIOLOGY PROCEDURE NOTE    Date: 4/12/2024    Procedure: IR NEPHROSTOMY TUBE CHECK/CHANGE/REPOSITION/REINSERTION/UPSIZE     Preoperative diagnosis: Displacement right nephrostomy tube, leaking left nephrostomy tube.    Postoperative diagnosis: Same.    Surgeon: Leena Cardenas MD     Assistant: None. No qualified resident was available.    Blood loss: Trace    Specimens: None    Findings: Successful replacement of right sided Hicks-Perry nephrostomy tube into lower pole calyx with high-grade infundibular stenosis.  Suture x 2.  Successful exchange of left-sided Hicks-Perry nephrostomy tube which was placed into the renal pelvis.  Suture x 2.    Bilateral distal ureteral obstruction is still present.    Complications: None immediate.    Anesthesia: local and IV Fentanyl

## 2024-04-15 ENCOUNTER — PATIENT OUTREACH (OUTPATIENT)
Dept: CASE MANAGEMENT | Facility: OTHER | Age: 69
End: 2024-04-15

## 2024-04-15 ENCOUNTER — VBI (OUTPATIENT)
Dept: FAMILY MEDICINE CLINIC | Facility: CLINIC | Age: 69
End: 2024-04-15

## 2024-04-15 ENCOUNTER — TELEPHONE (OUTPATIENT)
Age: 69
End: 2024-04-15

## 2024-04-15 DIAGNOSIS — Z71.89 COORDINATION OF COMPLEX CARE: Primary | ICD-10-CM

## 2024-04-15 NOTE — TELEPHONE ENCOUNTER
04/15/24 1:29 PM    Patient contacted post ED visit, first outreach attempt made. Message was left for patient to return a call to the VBI Department at [facility]: Phone [phone].    Thank you.  Luann Falk  [facility]

## 2024-04-15 NOTE — TELEPHONE ENCOUNTER
04/15/24 1:59 PM    Patient contacted post ED visit, VBI department spoke with patient/caregiver and outreach was successful.    Thank you.  Luann Falk  [facility]

## 2024-04-15 NOTE — TELEPHONE ENCOUNTER
PT called to schedule an E/R f/u with Dr Yost. The earliest appt I was able to see was in May which the PT does not want to wait that long, and wishes to see Dr Yost only.    Please call per PT's  request to inform her if Dr Yost is able to fit her in her schedule sooner than later.    Thank you

## 2024-04-15 NOTE — PROGRESS NOTES
Outpatient Care Management Note:  In basket referral received after recent ER visit.  Chart review completed, Outreach call scheduled.

## 2024-04-16 ENCOUNTER — PATIENT OUTREACH (OUTPATIENT)
Dept: CASE MANAGEMENT | Facility: OTHER | Age: 69
End: 2024-04-16

## 2024-04-16 NOTE — PROGRESS NOTES
Outpatient Care Management Note:  Outreach call placed to Ms. Melendez after recent ER visit.  Introduced myself and my role.     Ragini states she is doing well.  B/L nephrostomy tubes are in place and draining well.  Denies any needs at this time.   Has a follow up appointment scheduled with PCP as well as a scheduled appointment with IR. No medication changes made during ER visit. Closing care management episode at this time.

## 2024-04-18 ENCOUNTER — OFFICE VISIT (OUTPATIENT)
Dept: FAMILY MEDICINE CLINIC | Facility: CLINIC | Age: 69
End: 2024-04-18
Payer: COMMERCIAL

## 2024-04-18 VITALS
RESPIRATION RATE: 16 BRPM | WEIGHT: 142 LBS | BODY MASS INDEX: 30.63 KG/M2 | OXYGEN SATURATION: 97 % | HEIGHT: 57 IN | HEART RATE: 65 BPM | SYSTOLIC BLOOD PRESSURE: 124 MMHG | DIASTOLIC BLOOD PRESSURE: 78 MMHG

## 2024-04-18 DIAGNOSIS — T83.022A NEPHROSTOMY TUBE DISPLACED (HCC): ICD-10-CM

## 2024-04-18 DIAGNOSIS — L03.319 CELLULITIS OF TRUNK, UNSPECIFIED SITE OF TRUNK: Primary | ICD-10-CM

## 2024-04-18 DIAGNOSIS — D50.8 IRON DEFICIENCY ANEMIA SECONDARY TO INADEQUATE DIETARY IRON INTAKE: ICD-10-CM

## 2024-04-18 PROCEDURE — 99214 OFFICE O/P EST MOD 30 MIN: CPT | Performed by: FAMILY MEDICINE

## 2024-04-18 PROCEDURE — G2211 COMPLEX E/M VISIT ADD ON: HCPCS | Performed by: FAMILY MEDICINE

## 2024-04-18 RX ORDER — CEPHALEXIN 500 MG/1
500 CAPSULE ORAL EVERY 8 HOURS SCHEDULED
Qty: 21 CAPSULE | Refills: 0 | Status: SHIPPED | OUTPATIENT
Start: 2024-04-18 | End: 2024-04-18 | Stop reason: SDUPTHER

## 2024-04-18 RX ORDER — CEPHALEXIN 500 MG/1
500 CAPSULE ORAL EVERY 8 HOURS SCHEDULED
Qty: 21 CAPSULE | Refills: 0 | Status: SHIPPED | OUTPATIENT
Start: 2024-04-18 | End: 2024-04-25

## 2024-04-18 NOTE — PROGRESS NOTES
"Assessment/Plan:   Diagnoses and all orders for this visit:    Cellulitis of trunk, unspecified site of trunk  -     Discontinue: cephalexin (KEFLEX) 500 mg capsule; Take 1 capsule (500 mg total) by mouth every 8 (eight) hours for 7 days  -     cephalexin (KEFLEX) 500 mg capsule; Take 1 capsule (500 mg total) by mouth every 8 (eight) hours for 7 days  - was seen in ER on 4/12/2024 after inadvertently pulling out her R-sided nephrostomy tube   - both tubes were replaced by IR on 4/12/2024 (gets replaced [id number])   - cellulitis noted at nephrostomy tube insertion sites - will treat with Keflex 500mg TID for 7days (pt has tolerated in the past and renally dosed)   - RTO in 2-3days for close f/u - pt aware and agreeable   Nephrostomy tube displaced (HCC)  Iron deficiency anemia secondary to inadequate dietary iron intake   - reviewed labs done in ER - Hb holding steady at 10         Subjective:    Patient ID: Ragini Melendez is a 68 y.o. female.  HPI  69yo F presents to the office for ER f/u   - was seen in ER on 4/12/2024 after inadvertently pulling out her R-sided nephrostomy tube   - both tubes were replaced by IR on 4/12/2024 (gets replaced [id number])   - sites both (+) red and R-side more tender than L-side  - denies F/C/N/V/CP/palpitations/SOB/wheezing/abd pain/cloudy discharge  - has to be cancer free for 3-5yrs prior to transplant - so far 1.5yrs -- follow-up annually     The following portions of the patient's history were reviewed and updated as appropriate: allergies, current medications, past family history, past medical history, past social history, past surgical history and problem list.    Review of Systems  as per HPI    Objective:  /78 (BP Location: Left arm, Patient Position: Sitting, Cuff Size: Standard)   Pulse 65   Resp 16   Ht 4' 9\" (1.448 m)   Wt 64.4 kg (142 lb)   LMP  (LMP Unknown)   SpO2 97%   BMI 30.73 kg/m²    Physical Exam  Vitals reviewed.   Constitutional:       General: She " is not in acute distress.     Appearance: Normal appearance. She is not ill-appearing, toxic-appearing or diaphoretic.   HENT:      Head: Normocephalic and atraumatic.      Right Ear: External ear normal.      Left Ear: External ear normal.      Nose: Nose normal.   Eyes:      General: No scleral icterus.        Right eye: No discharge.         Left eye: No discharge.      Extraocular Movements: Extraocular movements intact.      Conjunctiva/sclera: Conjunctivae normal.   Cardiovascular:      Rate and Rhythm: Normal rate and regular rhythm.      Heart sounds: Normal heart sounds.   Pulmonary:      Effort: Pulmonary effort is normal. No respiratory distress.      Breath sounds: Normal breath sounds. No wheezing.   Abdominal:      Palpations: Abdomen is soft.   Skin:     Findings: Erythema present.      Comments: Noted to have some erythema and tenderness to palpation at both nephrostomy tube insertion sites    Neurological:      General: No focal deficit present.      Mental Status: She is alert and oriented to person, place, and time.   Psychiatric:         Mood and Affect: Mood normal.         Behavior: Behavior normal.

## 2024-04-23 ENCOUNTER — APPOINTMENT (OUTPATIENT)
Dept: LAB | Facility: CLINIC | Age: 69
End: 2024-04-23
Payer: COMMERCIAL

## 2024-04-23 ENCOUNTER — OFFICE VISIT (OUTPATIENT)
Dept: FAMILY MEDICINE CLINIC | Facility: CLINIC | Age: 69
End: 2024-04-23
Payer: COMMERCIAL

## 2024-04-23 VITALS
SYSTOLIC BLOOD PRESSURE: 116 MMHG | HEIGHT: 57 IN | BODY MASS INDEX: 31.28 KG/M2 | OXYGEN SATURATION: 99 % | HEART RATE: 75 BPM | WEIGHT: 145 LBS | DIASTOLIC BLOOD PRESSURE: 70 MMHG

## 2024-04-23 DIAGNOSIS — N18.4 ANEMIA IN STAGE 4 CHRONIC KIDNEY DISEASE  (HCC): ICD-10-CM

## 2024-04-23 DIAGNOSIS — Z86.718 HISTORY OF DVT (DEEP VEIN THROMBOSIS): ICD-10-CM

## 2024-04-23 DIAGNOSIS — Z98.84 BARIATRIC SURGERY STATUS: ICD-10-CM

## 2024-04-23 DIAGNOSIS — N25.81 SECONDARY HYPERPARATHYROIDISM (HCC): ICD-10-CM

## 2024-04-23 DIAGNOSIS — R80.9 NON-NEPHROTIC RANGE PROTEINURIA: ICD-10-CM

## 2024-04-23 DIAGNOSIS — D63.1 ANEMIA IN STAGE 4 CHRONIC KIDNEY DISEASE  (HCC): ICD-10-CM

## 2024-04-23 DIAGNOSIS — K91.2 POSTSURGICAL MALABSORPTION: ICD-10-CM

## 2024-04-23 DIAGNOSIS — D47.2 MGUS (MONOCLONAL GAMMOPATHY OF UNKNOWN SIGNIFICANCE): ICD-10-CM

## 2024-04-23 DIAGNOSIS — Z86.39 HISTORY OF METABOLIC ACIDOSIS: ICD-10-CM

## 2024-04-23 DIAGNOSIS — D50.8 IRON DEFICIENCY ANEMIA SECONDARY TO INADEQUATE DIETARY IRON INTAKE: ICD-10-CM

## 2024-04-23 DIAGNOSIS — E66.3 OVERWEIGHT: ICD-10-CM

## 2024-04-23 DIAGNOSIS — R79.89 ELEVATED FERRITIN: ICD-10-CM

## 2024-04-23 DIAGNOSIS — E43 UNSPECIFIED SEVERE PROTEIN-CALORIE MALNUTRITION (HCC): ICD-10-CM

## 2024-04-23 DIAGNOSIS — N18.4 STAGE 4 CHRONIC KIDNEY DISEASE (HCC): ICD-10-CM

## 2024-04-23 DIAGNOSIS — L03.319 CELLULITIS OF TRUNK, UNSPECIFIED SITE OF TRUNK: Primary | ICD-10-CM

## 2024-04-23 DIAGNOSIS — R74.8 ELEVATED VITAMIN B12 LEVEL: ICD-10-CM

## 2024-04-23 DIAGNOSIS — Z48.815 ENCOUNTER FOR SURGICAL AFTERCARE FOLLOWING SURGERY OF DIGESTIVE SYSTEM: ICD-10-CM

## 2024-04-23 DIAGNOSIS — R79.0 ABNORMAL IRON SATURATION: ICD-10-CM

## 2024-04-23 DIAGNOSIS — Z91.09 ENVIRONMENTAL ALLERGIES: ICD-10-CM

## 2024-04-23 DIAGNOSIS — I82.4Y1 ACUTE DEEP VEIN THROMBOSIS (DVT) OF PROXIMAL VEIN OF RIGHT LOWER EXTREMITY (HCC): ICD-10-CM

## 2024-04-23 LAB
BASOPHILS # BLD AUTO: 0.05 THOUSANDS/ÂΜL (ref 0–0.1)
BASOPHILS NFR BLD AUTO: 1 % (ref 0–1)
CREAT UR-MCNC: 33.3 MG/DL
CREAT UR-MCNC: 33.3 MG/DL
EOSINOPHIL # BLD AUTO: 0.03 THOUSAND/ÂΜL (ref 0–0.61)
EOSINOPHIL NFR BLD AUTO: 1 % (ref 0–6)
ERYTHROCYTE [DISTWIDTH] IN BLOOD BY AUTOMATED COUNT: 13.2 % (ref 11.6–15.1)
FERRITIN SERPL-MCNC: 727 NG/ML (ref 11–307)
HCT VFR BLD AUTO: 36.1 % (ref 34.8–46.1)
HGB BLD-MCNC: 11 G/DL (ref 11.5–15.4)
IMM GRANULOCYTES # BLD AUTO: 0.05 THOUSAND/UL (ref 0–0.2)
IMM GRANULOCYTES NFR BLD AUTO: 1 % (ref 0–2)
IRON SATN MFR SERPL: 41 % (ref 15–50)
IRON SERPL-MCNC: 108 UG/DL (ref 50–212)
LYMPHOCYTES # BLD AUTO: 0.78 THOUSANDS/ÂΜL (ref 0.6–4.47)
LYMPHOCYTES NFR BLD AUTO: 12 % (ref 14–44)
MCH RBC QN AUTO: 29.3 PG (ref 26.8–34.3)
MCHC RBC AUTO-ENTMCNC: 30.5 G/DL (ref 31.4–37.4)
MCV RBC AUTO: 96 FL (ref 82–98)
MICROALBUMIN UR-MCNC: 29 MG/L
MICROALBUMIN/CREAT 24H UR: 87 MG/G CREATININE (ref 0–30)
MONOCYTES # BLD AUTO: 0.57 THOUSAND/ÂΜL (ref 0.17–1.22)
MONOCYTES NFR BLD AUTO: 9 % (ref 4–12)
NEUTROPHILS # BLD AUTO: 5.01 THOUSANDS/ÂΜL (ref 1.85–7.62)
NEUTS SEG NFR BLD AUTO: 76 % (ref 43–75)
NRBC BLD AUTO-RTO: 0 /100 WBCS
PLATELET # BLD AUTO: 247 THOUSANDS/UL (ref 149–390)
PMV BLD AUTO: 11.3 FL (ref 8.9–12.7)
PROT UR-MCNC: 25 MG/DL
PROT/CREAT UR: 0.75 MG/G{CREAT} (ref 0–0.1)
RBC # BLD AUTO: 3.76 MILLION/UL (ref 3.81–5.12)
TIBC SERPL-MCNC: 261 UG/DL (ref 250–450)
UIBC SERPL-MCNC: 153 UG/DL (ref 155–355)
VIT B12 SERPL-MCNC: 1115 PG/ML (ref 180–914)
WBC # BLD AUTO: 6.49 THOUSAND/UL (ref 4.31–10.16)

## 2024-04-23 PROCEDURE — 82043 UR ALBUMIN QUANTITATIVE: CPT

## 2024-04-23 PROCEDURE — 83540 ASSAY OF IRON: CPT

## 2024-04-23 PROCEDURE — 83550 IRON BINDING TEST: CPT

## 2024-04-23 PROCEDURE — 36415 COLL VENOUS BLD VENIPUNCTURE: CPT

## 2024-04-23 PROCEDURE — 80053 COMPREHEN METABOLIC PANEL: CPT

## 2024-04-23 PROCEDURE — 86334 IMMUNOFIX E-PHORESIS SERUM: CPT

## 2024-04-23 PROCEDURE — 99214 OFFICE O/P EST MOD 30 MIN: CPT | Performed by: FAMILY MEDICINE

## 2024-04-23 PROCEDURE — 84165 PROTEIN E-PHORESIS SERUM: CPT

## 2024-04-23 PROCEDURE — 84156 ASSAY OF PROTEIN URINE: CPT

## 2024-04-23 PROCEDURE — 82728 ASSAY OF FERRITIN: CPT

## 2024-04-23 PROCEDURE — 83521 IG LIGHT CHAINS FREE EACH: CPT

## 2024-04-23 PROCEDURE — 84100 ASSAY OF PHOSPHORUS: CPT

## 2024-04-23 PROCEDURE — 84630 ASSAY OF ZINC: CPT

## 2024-04-23 PROCEDURE — 82784 ASSAY IGA/IGD/IGG/IGM EACH: CPT

## 2024-04-23 PROCEDURE — 82570 ASSAY OF URINE CREATININE: CPT

## 2024-04-23 PROCEDURE — 82607 VITAMIN B-12: CPT

## 2024-04-23 PROCEDURE — 85025 COMPLETE CBC W/AUTO DIFF WBC: CPT

## 2024-04-23 PROCEDURE — G2211 COMPLEX E/M VISIT ADD ON: HCPCS | Performed by: FAMILY MEDICINE

## 2024-04-23 RX ORDER — CETIRIZINE HYDROCHLORIDE 10 MG/1
10 TABLET ORAL DAILY
Qty: 90 TABLET | Refills: 1 | Status: SHIPPED | OUTPATIENT
Start: 2024-04-23

## 2024-04-23 NOTE — PROGRESS NOTES
"Assessment/Plan:   Diagnoses and all orders for this visit:    Cellulitis of trunk, unspecified site of trunk  - had b/l nephrostomy tubes replaced by IR on 4/12/2024  - was seen in the office on 4/18/2024 as had noted both insertion sites were red and tender and was started on Keflex 500mg TID for 7days for cellulitis   - sites much better - not erythematous or tender - advised to cont and complete course of abx   - CBC with Hb 11 and no WBC  - f/u PRN     Environmental allergies  -     cetirizine (ZyrTEC) 10 mg tablet; Take 1 tablet (10 mg total) by mouth daily    Elevated ferritin  - noted to have Ferritin 727   - no getting IV Fe infusions but taking oral supplements   - follows with Heme/Onc   - advised to STOP for now and repeat labs at the end of Summer - pt aware and agreeable     Elevated vitamin B12 level  - Vit B12 1115  - advised to STOP supplements and repeat labs at the end of Summer - pt aware and agreeable             Subjective:    Patient ID: Ragini Melendez is a 68 y.o. female.  HPI  69yo F presents to the office for f/u of Cellulitis   - had b/l nephrostomy tubes replaced by IR on 4/12/2024  - was seen in the office on 4/18/2024 as had noted both insertion sites were red and tender and was started on Keflex 500mg TID for 7days for cellulitis   - pt here for f/u today - sites much better - denies redness and pain   - had labs drawn earlier today - Hb 11, elevated Ferritin and B12 levels  - denies F/C/N/V/CP/palpitations/SOB/wheezing/abd pain/LE edema       The following portions of the patient's history were reviewed and updated as appropriate: allergies, current medications, past family history, past medical history, past social history, past surgical history and problem list.    Review of Systems  as per HPI    Objective:  /70   Pulse 75   Ht 4' 9\" (1.448 m)   Wt 65.8 kg (145 lb)   LMP  (LMP Unknown)   SpO2 99%   BMI 31.38 kg/m²    Physical Exam  Vitals reviewed.   Constitutional:  "      General: She is not in acute distress.     Appearance: Normal appearance. She is not ill-appearing, toxic-appearing or diaphoretic.   HENT:      Head: Normocephalic and atraumatic.      Right Ear: External ear normal.      Left Ear: External ear normal.      Nose: Nose normal.   Eyes:      General: No scleral icterus.        Right eye: No discharge.         Left eye: No discharge.      Extraocular Movements: Extraocular movements intact.      Conjunctiva/sclera: Conjunctivae normal.   Cardiovascular:      Rate and Rhythm: Normal rate and regular rhythm.      Heart sounds: Normal heart sounds.   Pulmonary:      Effort: Pulmonary effort is normal. No respiratory distress.      Breath sounds: Normal breath sounds. No stridor. No wheezing, rhonchi or rales.   Abdominal:      Palpations: Abdomen is soft.   Musculoskeletal:         General: Normal range of motion.      Right lower leg: No edema.      Left lower leg: No edema.   Skin:     General: Skin is warm.      Comments: Nephrostomy tube insertion sites intact, non-tender, no erythema    Neurological:      General: No focal deficit present.      Mental Status: She is alert and oriented to person, place, and time.   Psychiatric:         Mood and Affect: Mood normal.         Behavior: Behavior normal.

## 2024-04-24 ENCOUNTER — TELEPHONE (OUTPATIENT)
Dept: NEPHROLOGY | Facility: CLINIC | Age: 69
End: 2024-04-24

## 2024-04-24 LAB
ALBUMIN SERPL BCP-MCNC: 4.2 G/DL (ref 3.5–5)
ALP SERPL-CCNC: 133 U/L (ref 34–104)
ALT SERPL W P-5'-P-CCNC: 32 U/L (ref 7–52)
ANION GAP SERPL CALCULATED.3IONS-SCNC: 12 MMOL/L (ref 4–13)
AST SERPL W P-5'-P-CCNC: 21 U/L (ref 13–39)
BILIRUB SERPL-MCNC: 0.34 MG/DL (ref 0.2–1)
BUN SERPL-MCNC: 48 MG/DL (ref 5–25)
CALCIUM SERPL-MCNC: 8.8 MG/DL (ref 8.4–10.2)
CHLORIDE SERPL-SCNC: 99 MMOL/L (ref 96–108)
CO2 SERPL-SCNC: 25 MMOL/L (ref 21–32)
CREAT SERPL-MCNC: 2.1 MG/DL (ref 0.6–1.3)
GFR SERPL CREATININE-BSD FRML MDRD: 23 ML/MIN/1.73SQ M
GLUCOSE SERPL-MCNC: 80 MG/DL (ref 65–140)
IGA SERPL-MCNC: 385 MG/DL (ref 66–433)
IGG SERPL-MCNC: 1685 MG/DL (ref 635–1741)
IGM SERPL-MCNC: 81 MG/DL (ref 45–281)
KAPPA LC FREE SER-MCNC: 104.1 MG/L (ref 3.3–19.4)
KAPPA LC FREE/LAMBDA FREE SER: 1.54 {RATIO} (ref 0.26–1.65)
LAMBDA LC FREE SERPL-MCNC: 67.6 MG/L (ref 5.7–26.3)
PHOSPHATE SERPL-MCNC: 5.4 MG/DL (ref 2.3–4.1)
POTASSIUM SERPL-SCNC: 4.4 MMOL/L (ref 3.5–5.3)
PROT SERPL-MCNC: 7.9 G/DL (ref 6.4–8.4)
SODIUM SERPL-SCNC: 136 MMOL/L (ref 135–147)

## 2024-04-24 NOTE — TELEPHONE ENCOUNTER
----- Message from Wade Villalobos MD sent at 4/24/2024  2:59 PM EDT -----  Kidney numbers are looking stable at stage 4 kidney disease as before.  Electrolytes are within normal limits.  Phosphorus level is elevated but at goal.  Continue PhosLo as such.  Amount of protein in the urine continues to improve which is reassuring.

## 2024-04-25 LAB
ALBUMIN SERPL ELPH-MCNC: 3.94 G/DL (ref 3.2–5.1)
ALBUMIN SERPL ELPH-MCNC: 53.2 % (ref 48–70)
ALPHA1 GLOB SERPL ELPH-MCNC: 0.3 G/DL (ref 0.15–0.47)
ALPHA1 GLOB SERPL ELPH-MCNC: 4 % (ref 1.8–7)
ALPHA2 GLOB SERPL ELPH-MCNC: 0.7 G/DL (ref 0.42–1.04)
ALPHA2 GLOB SERPL ELPH-MCNC: 9.5 % (ref 5.9–14.9)
BETA GLOB ABNORMAL SERPL ELPH-MCNC: 0.39 G/DL (ref 0.31–0.57)
BETA1 GLOB SERPL ELPH-MCNC: 5.3 % (ref 4.7–7.7)
BETA2 GLOB SERPL ELPH-MCNC: 5.9 % (ref 3.1–7.9)
BETA2+GAMMA GLOB SERPL ELPH-MCNC: 0.44 G/DL (ref 0.2–0.58)
GAMMA GLOB ABNORMAL SERPL ELPH-MCNC: 1.64 G/DL (ref 0.4–1.66)
GAMMA GLOB SERPL ELPH-MCNC: 22.1 % (ref 6.9–22.3)
IGG/ALB SER: 1.14 {RATIO} (ref 1.1–1.8)
INTERPRETATION UR IFE-IMP: NORMAL
PROT PATTERN SERPL ELPH-IMP: NORMAL
PROT SERPL-MCNC: 7.4 G/DL (ref 6.4–8.2)
ZINC SERPL-MCNC: 61 UG/DL (ref 44–115)

## 2024-04-25 PROCEDURE — 86334 IMMUNOFIX E-PHORESIS SERUM: CPT | Performed by: STUDENT IN AN ORGANIZED HEALTH CARE EDUCATION/TRAINING PROGRAM

## 2024-04-25 PROCEDURE — 84165 PROTEIN E-PHORESIS SERUM: CPT | Performed by: STUDENT IN AN ORGANIZED HEALTH CARE EDUCATION/TRAINING PROGRAM

## 2024-05-02 PROBLEM — I12.9 HYPERTENSIVE CKD (CHRONIC KIDNEY DISEASE): Status: ACTIVE | Noted: 2024-05-02

## 2024-05-03 NOTE — TELEPHONE ENCOUNTER
Called and spoke to the patient to relay the message above. Patient expressed understanding and had no further questions or concerns at this time.   no

## 2024-05-09 ENCOUNTER — HOSPITAL ENCOUNTER (OUTPATIENT)
Dept: INFUSION CENTER | Facility: CLINIC | Age: 69
Discharge: HOME | End: 2024-05-09
Payer: COMMERCIAL

## 2024-05-09 DIAGNOSIS — Z45.2 ENCOUNTER FOR CENTRAL LINE CARE: Primary | ICD-10-CM

## 2024-05-09 DIAGNOSIS — C54.1 ENDOMETRIAL CANCER (HCC): ICD-10-CM

## 2024-05-09 PROCEDURE — 96523 IRRIG DRUG DELIVERY DEVICE: CPT

## 2024-05-09 NOTE — PROGRESS NOTES
Received for port maintenance. No lab orders noted. LPAC accessed without issue, flushed without difficulty and brisk blood return noted. Pt will make appt to return in 3 months. AVS declined.

## 2024-05-15 ENCOUNTER — TELEPHONE (OUTPATIENT)
Dept: HEMATOLOGY ONCOLOGY | Facility: CLINIC | Age: 69
End: 2024-05-15

## 2024-05-15 NOTE — TELEPHONE ENCOUNTER
Call out to patient to reschedule appointment from 8/19/24 to 8/23/24 @ 10:40AM.  Patient agreeable to plan, aware to reach out to office with any problems.

## 2024-05-22 ENCOUNTER — OFFICE VISIT (OUTPATIENT)
Dept: UROLOGY | Facility: AMBULATORY SURGERY CENTER | Age: 69
End: 2024-05-22
Payer: COMMERCIAL

## 2024-05-22 ENCOUNTER — TELEPHONE (OUTPATIENT)
Dept: UROLOGY | Facility: AMBULATORY SURGERY CENTER | Age: 69
End: 2024-05-22

## 2024-05-22 VITALS
WEIGHT: 142 LBS | OXYGEN SATURATION: 97 % | SYSTOLIC BLOOD PRESSURE: 120 MMHG | BODY MASS INDEX: 30.63 KG/M2 | HEIGHT: 57 IN | DIASTOLIC BLOOD PRESSURE: 78 MMHG | HEART RATE: 76 BPM

## 2024-05-22 DIAGNOSIS — C54.1 ENDOMETRIAL CANCER (HCC): Primary | Chronic | ICD-10-CM

## 2024-05-22 PROCEDURE — 99214 OFFICE O/P EST MOD 30 MIN: CPT | Performed by: UROLOGY

## 2024-05-22 NOTE — LETTER
May 22, 2024     Emely Yost DO  [address] [facility] [address]    Patient: Ragini Melendez   YOB: 1955   Date of Visit: 5/22/2024       Dear Dr. Yost:    Thank you for referring Ragini Melendez to me for evaluation. Below are my notes for this consultation.    If you have questions, please do not hesitate to call me. I look forward to following your patient along with you.         Sincerely,        Demetrius Lowe MD        CC: MD Mily Delgado MD Frank Jeremy Tamarkin, MD  5/22/2024  4:02 PM  Sign when Signing Visit  5/22/2024    Ragini Melendez  1955  [id number]    1. Endometrial cancer (HCC)  Assessment & Plan:  Impression: History of T1b endometrial carcinoma, status post chemo/radiation/surgery, bilateral distal ureteral strictures likely secondary to radiation, bilateral indwelling nephrostomy tubes    Plan: Today we discussed exploratory laparotomy with creation of an ileal conduit with the goal to ultimately remove her nephrostomy tubes.  We discussed the challenges that could be associated with the surgery secondary to her prior history of abdominal surgery as well as radiation.  We discussed that surgery may not be possible at the time of exploratory laparotomy.  We also discussed that at some point she may be a candidate for a renal transplant and we would leave her native bladder in place.  For now she will continue to follow with her nephrologist Dr. Bond regarding her transplant status.  She will see Dr. Rodriguez and we will then address possible exploratory laparotomy with diverting ileal conduit in the fall 2024.  In the interim her nephrostomy tubes will be open to gravity with routine changes in interventional radiology.       History of Present Illness  68 y.o. female with a history of stage Ib endometrial carcinoma treated with chemo, radiation followed by surgery.  She developed bilateral distal ureteral strictures at the  level of the pelvic brim.  Her kidneys have been managed with bilateral indwelling nephrostomy tubes.  She has more urine production from her left and right side.  She has stage IV chronic kidney disease.  She follows with nephrology and has been entertaining a possible renal transplant although she has only been disease-free of her endometrial cancer for approximately 1.5 years.  Prior abdominal surgery includes hysterectomy as well as an open gastric bypass.  She recently had her nephrostomy tubes exchanged.  She is scheduled to see her gynecological oncologist, Dr. Rodriguez, on July 1, 2024.      AUA Symptom Score      Review of Systems    Past Medical History  Past Medical History:   Diagnosis Date   • Anemia    • Anxiety    • Cancer (HCC)     ENDOMETRIAL   • Chemotherapy induced neutropenia (HCC) 8/30/2019   • CKD (chronic kidney disease) stage 3, GFR 30-59 ml/min (HCC)    • COVID     Fall 2022   • Depression    • DVT (deep venous thrombosis) (HCC) 07/19/2019    RIGHT LEG   • Endometrial cancer (HCC) 12/2017   • GERD (gastroesophageal reflux disease)    • H/O gastric bypass    • Hard to intubate     pt denies AS OF 7/25/23   • History of chemotherapy     started 12/2021endometrial cancer- done 12/23/22   • History of DVT in adulthood     RLE   • History of transfusion 04/13/2023   • Hyperglycemia     vx type 2 dm -- last assessed 4/1/14; resolved 11/7/17   • Hyperlipidemia    • Hypertension     in past- no meds at present   • Iron deficiency anemia     iron infusions weekly   • OAB (overactive bladder)    • Port-A-Cath in place    • Wears glasses        Past Social History  Past Surgical History:   Procedure Laterality Date   • ABDOMINAL SURGERY      GASTRIC BYPASS; 2001   • BREAST BIOPSY Right 06/28/2019    core biopsy; benign   • CHOLECYSTECTOMY      at the time of gastric bypass   • COLONOSCOPY     • CT NEEDLE BIOPSY LYMPH NODE  07/08/2019   • FL GUIDED CENTRAL VENOUS ACCESS DEVICE INSERTION  11/12/2019   • FL  RETROGRADE PYELOGRAM  03/30/2023   • FL RETROGRADE PYELOGRAM  05/09/2023   • FL RETROGRADE PYELOGRAM  8/1/2023   • GASTRIC BYPASS     • HYSTERECTOMY Bilateral 2017    total abdominal with salpingo-oophorectomy   • IR NEPHROSTOMY TO NEPHROURETERAL STENT  06/09/2022   • IR NEPHROSTOMY TO NEPHROURETERAL STENT  12/20/2022   • IR NEPHROSTOMY TO NEPHROURETERAL STENT  8/8/2023   • IR NEPHROSTOMY TUBE CHECK/CHANGE/REPOSITION/REINSERTION/UPSIZE  05/27/2022   • IR NEPHROSTOMY TUBE CHECK/CHANGE/REPOSITION/REINSERTION/UPSIZE  11/10/2023   • IR NEPHROSTOMY TUBE CHECK/CHANGE/REPOSITION/REINSERTION/UPSIZE  2/9/2024   • IR NEPHROSTOMY TUBE CHECK/CHANGE/REPOSITION/REINSERTION/UPSIZE  4/12/2024   • IR NEPHROSTOMY TUBE PLACEMENT  07/26/2019   • IR NEPHROSTOMY TUBE PLACEMENT  05/27/2023   • IR NEPHROURETERAL STENT CHECK/CHANGE/REPOSITION  04/06/2021   • IR NEPHROURETERAL STENT CHECK/CHANGE/REPOSITION  06/04/2021   • IR NEPHROURETERAL STENT CHECK/CHANGE/REPOSITION  09/03/2021   • IR NEPHROURETERAL STENT CHECK/CHANGE/REPOSITION  12/07/2021   • IR NEPHROURETERAL STENT CHECK/CHANGE/REPOSITION  03/08/2022   • IR NEPHROURETERAL STENT CHECK/CHANGE/REPOSITION  05/10/2022   • IR NEPHROURETERAL STENT CHECK/CHANGE/REPOSITION  09/19/2022   • IR PICC LINE  09/27/2019   • IR PORT PLACEMENT  07/26/2019   • IR PORT PLACEMENT     • IR PORT REMOVAL  09/20/2019   • OOPHORECTOMY Bilateral 2017   • KS CYSTO BLADDER W/URETERAL CATHETERIZATION Right 03/30/2023    Procedure: CYSTOSCOPY RETROGRADE PYELOGRAM WITH exchange of STENT URETERAL;  Surgeon: Demetrius Lowe MD;  Location: [facility];  Service: Urology   • KS CYSTO BLADDER W/URETERAL CATHETERIZATION Bilateral 05/09/2023    Procedure: CYSTOSCOPY, BILATERAL RETROGRADE PYELOGRAM, WITH LEFT INSERTION STENT URETERAL, RIGHT URTERAL STENT EXCHANGE;  Surgeon: Nicola Hannah MD;  [facility];  Service: Urology   • KS CYSTO BLADDER W/URETERAL CATHETERIZATION Bilateral 8/1/2023    Procedure:  CYSTOSCOPY BILATERAL RETROGRADE  WITH REMOVAL BILATERAL  STENT URETERAL;  Surgeon: Demetrius Lowe MD;  Location: [facility];  Service: Urology   • MO CYSTO W/INSERT URETERAL STENT Right 03/30/2023    Procedure: EXCHANGE STENT URETERAL;  Surgeon: Demetrius Lowe MD;  [facility];  Service: Urology   • MO INSJ TUNNELED CTR VAD W/SUBQ PORT AGE 5 YR/> Left 11/12/2019    Procedure: INSERTION VENOUS PORT ( PORT-A-CATH) IR;  Surgeon: Edilberto Guzman DO;  [facility];  Service: Interventional Radiology   • MO LAPS ABD PRTM&OMENTUM DX W/WO SPEC BR/WA SPX N/A 12/19/2017    Procedure: LAPAROSCOPY DIAGNOSTIC;  Surgeon: Evgeny So MD;  Location: [facility];  Service: Gynecology Oncology   • MO LAPS W/RAD HYST W/BILAT LMPHADEC RMVL TUBE/OVARY N/A 12/19/2017    Procedure: HYSTERECTOMY LAPAROSCOPIC TOTAL (LTH) W/ ROBOTICS; BILATERAL SALPINGOOPHERECTOMY; LYMPH NODE DISSECTION; lysis of adhesions;  Surgeon: Evgeny So MD;  [facility];  Service: Gynecology Oncology   • REMOVAL URETERAL STENT Bilateral 8/1/2023    Procedure: REMOVAL STENT URETERAL;  Surgeon: Demetrius Lowe MD;  [facility];  Service: Urology   • TONSILLECTOMY     • US GUIDED BREAST BIOPSY RIGHT COMPLETE Right 06/28/2019       Past Family History  Family History   Problem Relation Age of Onset   • Hyperlipidemia Mother    • Heart disease Mother    • Ovarian cancer Mother 50   • Colon cancer Mother    • Lymphoma Father    • Breast cancer Sister 63   • No Known Problems Brother    • No Known Problems Brother    • No Known Problems Maternal Grandmother    • Bone cancer Maternal Grandfather    • Uterine cancer Paternal Grandmother    • No Known Problems Paternal Grandfather    • No Known Problems Maternal Aunt    • No Known Problems Maternal Aunt    • No Known Problems Maternal Aunt    • No Known Problems Maternal Aunt    • No Known Problems Paternal Aunt    • No Known Problems Paternal Aunt    • No  Known Problems Paternal Aunt    • No Known Problems Paternal Aunt        Past Social history  Social History     Socioeconomic History   • Marital status: Single     Spouse name: Not on file   • Number of children: Not on file   • Years of education: Not on file   • Highest education level: Not on file   Occupational History   • Not on file   Tobacco Use   • Smoking status: Never   • Smokeless tobacco: Never   Vaping Use   • Vaping status: Never Used   Substance and Sexual Activity   • Alcohol use: Never   • Drug use: Never   • Sexual activity: Not Currently   Other Topics Concern   • Not on file   Social History Narrative   • Not on file     Social Determinants of Health     Financial Resource Strain: Low Risk  (9/26/2023)    Overall Financial Resource Strain (CARDIA)    • Difficulty of Paying Living Expenses: Not very hard   Food Insecurity: No Food Insecurity (5/30/2023)    Hunger Vital Sign    • Worried About Running Out of Food in the Last Year: Never true    • Ran Out of Food in the Last Year: Never true   Transportation Needs: No Transportation Needs (9/26/2023)    PRAPARE - Transportation    • Lack of Transportation (Medical): No    • Lack of Transportation (Non-Medical): No   Physical Activity: Not on file   Stress: Not on file   Social Connections: Not on file   Intimate Partner Violence: Not on file   Housing Stability: Low Risk  (5/30/2023)    Housing Stability Vital Sign    • Unable to Pay for Housing in the Last Year: No    • Number of Places Lived in the Last Year: 1    • Unstable Housing in the Last Year: No       Current Medications  Current Outpatient Medications   Medication Sig Dispense Refill   • ARIPiprazole (ABILIFY) 20 MG tablet Take 20 mg by mouth every morning     • BD PosiFlush 0.9 % SOLN      • calcium acetate (PHOSLO) capsule Take 1 capsule (667 mg total) by mouth 2 (two) times a day with meals 180 capsule 3   • Calcium-Magnesium-Vitamin D (CALCIUM 500 PO) Take 1 capsule by mouth daily  at bedtime     • cetirizine (ZyrTEC) 10 mg tablet Take 1 tablet (10 mg total) by mouth daily 90 tablet 1   • cholecalciferol (VITAMIN D3) 1,000 units tablet Take 4 tablets (4,000 Units total) by mouth daily (Patient taking differently: Take 4,000 Units by mouth daily at bedtime) 90 tablet 0   • fluticasone (FLONASE) 50 mcg/act nasal spray 1 spray into each nostril daily 15.8 mL 0   • folic acid (FOLVITE) 1 mg tablet Take 1 tablet (1 mg total) by mouth daily (Patient taking differently: Take 1 mg by mouth daily at bedtime)  0   • Multiple Vitamin (MULTIVITAMIN) tablet Take 1 tablet by mouth every morning     • oxybutynin (DITROPAN XL) 15 MG 24 hr tablet Take 1 tablet (15 mg total) by mouth daily at bedtime 90 tablet 1   • saccharomyces boulardii (FLORASTOR) 250 mg capsule Take 1 capsule (250 mg total) by mouth daily after lunch     • senna (SENOKOT) 8.6 MG tablet Take 1 tablet (8.6 mg total) by mouth 2 (two) times a day as needed for constipation 60 tablet 0   • sodium bicarbonate 650 mg tablet Take 1 tablet (650 mg total) by mouth 3 (three) times a day 270 tablet 3   • sodium chloride, PF, 0.9 % 10 mL by Intracatheter route daily for 90 doses 300 mL 2   • venlafaxine (EFFEXOR-XR) 75 mg 24 hr capsule TAKE 3 CAPS DAILY     • Vibegron (Gemtesa) 75 MG TABS Take 1 capsule by mouth every morning 90 tablet 3   • calcitriol (ROCALTROL) 0.25 mcg capsule Take 1 capsule (0.25 mcg total) by mouth 3 (three) times a week 12 capsule 3   • Cyanocobalamin (VITAMIN B12 PO) Take 1 capsule by mouth daily after lunch (Patient not taking: Reported on 5/22/2024)     • dronabinol (MARINOL) 2.5 mg capsule Take 1 capsule (2.5 mg total) by mouth 2 (two) times a day before meals (Patient not taking: Reported on 5/22/2024) 30 capsule 0   • furosemide (LASIX) 20 mg tablet TAKE 1 TABLET (20 MG TOTAL) BY MOUTH IF NEEDED (FOR INCREASED LEG SWELLING OR WEIGHT GAIN >2 LBS IN 1 DAY) (Patient not taking: Reported on 5/22/2024) 90 tablet 1   •  "omeprazole (PriLOSEC) 20 mg delayed release capsule Take 20 mg by mouth every morning (Patient not taking: Reported on 5/22/2024)     • sodium chloride, PF, 0.9 % 10 mL by Intracatheter route daily Intracatheter flushing daily. May substitute prefilled syringe with normal saline 10 mL vials, 10 mL syringes, and 18 g blunt needles 300 mL 2   • sodium chloride, PF, 0.9 % 10 mL by Intracatheter route daily for 120 doses Intracatheter flushing daily. May substitute prefilled syringe with normal saline 10 mL vials, 10 mL syringes, and 18 g blunt needles 300 mL 3   • sodium chloride, PF, 0.9 % 10 mL by Intracatheter route daily for 120 doses Intracatheter flushing daily. May substitute prefilled syringe with normal saline 10 mL vials, 10 mL syringes, and 18 g blunt needles 300 mL 3     No current facility-administered medications for this visit.       Allergies  Allergies   Allergen Reactions   • Cephalosporins Rash     Which Cephalosporin reaction was to not specified; however, has tolerated Amoxicillin, Cefazolin, and Cefepime       Past Medical History, Social History, Family History, medications and allergies were reviewed.    Vitals  Vitals:    05/22/24 1515   BP: 120/78   BP Location: Left arm   Patient Position: Sitting   Cuff Size: Adult   Pulse: 76   SpO2: 97%   Weight: 64.4 kg (142 lb)   Height: 4' 9\" (1.448 m)       Physical Exam  On examination she is in no acute distress.  Abdomen is soft nontender nondistended.  Bilateral nephrostomy tubes are in place.    Results  No results found for: \"PSA\"  Lab Results   Component Value Date    GLUCOSE 219 (H) 12/19/2017    CALCIUM 8.8 04/23/2024     10/27/2017    K 4.4 04/23/2024    CO2 25 04/23/2024    CL 99 04/23/2024    BUN 48 (H) 04/23/2024    CREATININE 2.10 (H) 04/23/2024     Lab Results   Component Value Date    WBC 6.49 04/23/2024    HGB 11.0 (L) 04/23/2024    HCT 36.1 04/23/2024    MCV 96 04/23/2024     04/23/2024       Office Urine Dip  No results " found for this or any previous visit (from the past 1 hour(s)).]

## 2024-05-22 NOTE — TELEPHONE ENCOUNTER
Per Dr. Lowe's Note:  Return for july with FT .     Please assist in finding an appropriate date and time.

## 2024-05-22 NOTE — ASSESSMENT & PLAN NOTE
Impression: History of T1b endometrial carcinoma, status post chemo/radiation/surgery, bilateral distal ureteral strictures likely secondary to radiation, bilateral indwelling nephrostomy tubes    Plan: Today we discussed exploratory laparotomy with creation of an ileal conduit with the goal to ultimately remove her nephrostomy tubes.  We discussed the challenges that could be associated with the surgery secondary to her prior history of abdominal surgery as well as radiation.  We discussed that surgery may not be possible at the time of exploratory laparotomy.  We also discussed that at some point she may be a candidate for a renal transplant and we would leave her native bladder in place.  For now she will continue to follow with her nephrologist Dr. Bond regarding her transplant status.  She will see Dr. Rodriguez and we will then address possible exploratory laparotomy with diverting ileal conduit in the fall 2024.  In the interim her nephrostomy tubes will be open to gravity with routine changes in interventional radiology.

## 2024-05-22 NOTE — PROGRESS NOTES
5/22/2024    Ragini Melendez  1955  [id number]    1. Endometrial cancer (HCC)  Assessment & Plan:  Impression: History of T1b endometrial carcinoma, status post chemo/radiation/surgery, bilateral distal ureteral strictures likely secondary to radiation, bilateral indwelling nephrostomy tubes    Plan: Today we discussed exploratory laparotomy with creation of an ileal conduit with the goal to ultimately remove her nephrostomy tubes.  We discussed the challenges that could be associated with the surgery secondary to her prior history of abdominal surgery as well as radiation.  We discussed that surgery may not be possible at the time of exploratory laparotomy.  We also discussed that at some point she may be a candidate for a renal transplant and we would leave her native bladder in place.  For now she will continue to follow with her nephrologist Dr. Bnod regarding her transplant status.  She will see Dr. Rodriguez and we will then address possible exploratory laparotomy with diverting ileal conduit in the fall 2024.  In the interim her nephrostomy tubes will be open to gravity with routine changes in interventional radiology.       History of Present Illness  68 y.o. female with a history of stage Ib endometrial carcinoma treated with chemo, radiation followed by surgery.  She developed bilateral distal ureteral strictures at the level of the pelvic brim.  Her kidneys have been managed with bilateral indwelling nephrostomy tubes.  She has more urine production from her left and right side.  She has stage IV chronic kidney disease.  She follows with nephrology and has been entertaining a possible renal transplant although she has only been disease-free of her endometrial cancer for approximately 1.5 years.  Prior abdominal surgery includes hysterectomy as well as an open gastric bypass.  She recently had her nephrostomy tubes exchanged.  She is scheduled to see her gynecological oncologist, Dr. Rodriugez, on July 1,  2024.      AUA Symptom Score      Review of Systems    Past Medical History  Past Medical History:   Diagnosis Date   • Anemia    • Anxiety    • Cancer (HCC)     ENDOMETRIAL   • Chemotherapy induced neutropenia (HCC) 8/30/2019   • CKD (chronic kidney disease) stage 3, GFR 30-59 ml/min (HCC)    • COVID     Fall 2022   • Depression    • DVT (deep venous thrombosis) (HCC) 07/19/2019    RIGHT LEG   • Endometrial cancer (HCC) 12/2017   • GERD (gastroesophageal reflux disease)    • H/O gastric bypass    • Hard to intubate     pt denies AS OF 7/25/23   • History of chemotherapy     started 12/2021endometrial cancer- done 12/23/22   • History of DVT in adulthood     RLE   • History of transfusion 04/13/2023   • Hyperglycemia     vx type 2 dm -- last assessed 4/1/14; resolved 11/7/17   • Hyperlipidemia    • Hypertension     in past- no meds at present   • Iron deficiency anemia     iron infusions weekly   • OAB (overactive bladder)    • Port-A-Cath in place    • Wears glasses        Past Social History  Past Surgical History:   Procedure Laterality Date   • ABDOMINAL SURGERY      GASTRIC BYPASS; 2001   • BREAST BIOPSY Right 06/28/2019    core biopsy; benign   • CHOLECYSTECTOMY      at the time of gastric bypass   • COLONOSCOPY     • CT NEEDLE BIOPSY LYMPH NODE  07/08/2019   • FL GUIDED CENTRAL VENOUS ACCESS DEVICE INSERTION  11/12/2019   • FL RETROGRADE PYELOGRAM  03/30/2023   • FL RETROGRADE PYELOGRAM  05/09/2023   • FL RETROGRADE PYELOGRAM  8/1/2023   • GASTRIC BYPASS     • HYSTERECTOMY Bilateral 2017    total abdominal with salpingo-oophorectomy   • IR NEPHROSTOMY TO NEPHROURETERAL STENT  06/09/2022   • IR NEPHROSTOMY TO NEPHROURETERAL STENT  12/20/2022   • IR NEPHROSTOMY TO NEPHROURETERAL STENT  8/8/2023   • IR NEPHROSTOMY TUBE CHECK/CHANGE/REPOSITION/REINSERTION/UPSIZE  05/27/2022   • IR NEPHROSTOMY TUBE CHECK/CHANGE/REPOSITION/REINSERTION/UPSIZE  11/10/2023   • IR NEPHROSTOMY TUBE  CHECK/CHANGE/REPOSITION/REINSERTION/UPSIZE  2/9/2024   • IR NEPHROSTOMY TUBE CHECK/CHANGE/REPOSITION/REINSERTION/UPSIZE  4/12/2024   • IR NEPHROSTOMY TUBE PLACEMENT  07/26/2019   • IR NEPHROSTOMY TUBE PLACEMENT  05/27/2023   • IR NEPHROURETERAL STENT CHECK/CHANGE/REPOSITION  04/06/2021   • IR NEPHROURETERAL STENT CHECK/CHANGE/REPOSITION  06/04/2021   • IR NEPHROURETERAL STENT CHECK/CHANGE/REPOSITION  09/03/2021   • IR NEPHROURETERAL STENT CHECK/CHANGE/REPOSITION  12/07/2021   • IR NEPHROURETERAL STENT CHECK/CHANGE/REPOSITION  03/08/2022   • IR NEPHROURETERAL STENT CHECK/CHANGE/REPOSITION  05/10/2022   • IR NEPHROURETERAL STENT CHECK/CHANGE/REPOSITION  09/19/2022   • IR PICC LINE  09/27/2019   • IR PORT PLACEMENT  07/26/2019   • IR PORT PLACEMENT     • IR PORT REMOVAL  09/20/2019   • OOPHORECTOMY Bilateral 2017   • WA CYSTO BLADDER W/URETERAL CATHETERIZATION Right 03/30/2023    Procedure: CYSTOSCOPY RETROGRADE PYELOGRAM WITH exchange of STENT URETERAL;  Surgeon: Demetrius Lowe MD;  [facility];  Service: Urology   • WA CYSTO BLADDER W/URETERAL CATHETERIZATION Bilateral 05/09/2023    Procedure: CYSTOSCOPY, BILATERAL RETROGRADE PYELOGRAM, WITH LEFT INSERTION STENT URETERAL, RIGHT URTERAL STENT EXCHANGE;  Surgeon: Nicola Hannah MD;  [facility];  Service: Urology   • WA CYSTO BLADDER W/URETERAL CATHETERIZATION Bilateral 8/1/2023    Procedure: CYSTOSCOPY BILATERAL RETROGRADE  WITH REMOVAL BILATERAL  STENT URETERAL;  Surgeon: Demetrius Lowe MD;  [facility];  Service: Urology   • WA CYSTO W/INSERT URETERAL STENT Right 03/30/2023    Procedure: EXCHANGE STENT URETERAL;  Surgeon: Demetrius Lowe MD;  [facility];  Service: Urology   • WA INSJ TUNNELED CTR VAD W/SUBQ PORT AGE 5 YR/> Left 11/12/2019    Procedure: INSERTION VENOUS PORT ( PORT-A-CATH) IR;  Surgeon: Edilberto Guzman DO;  [facility];  Service: Interventional Radiology   • WA LAPS ABD  PRTM&OMENTUM DX W/WO SPEC BR/WA SPX N/A 12/19/2017    Procedure: LAPAROSCOPY DIAGNOSTIC;  Surgeon: Evgeny So MD;  [facility];  Service: Gynecology Oncology   • ME LAPS W/RAD HYST W/BILAT LMPHADEC RMVL TUBE/OVARY N/A 12/19/2017    Procedure: HYSTERECTOMY LAPAROSCOPIC TOTAL (LTH) W/ ROBOTICS; BILATERAL SALPINGOOPHERECTOMY; LYMPH NODE DISSECTION; lysis of adhesions;  Surgeon: Evgeny So MD;  [facility];  Service: Gynecology Oncology   • REMOVAL URETERAL STENT Bilateral 8/1/2023    Procedure: REMOVAL STENT URETERAL;  Surgeon: Demetrius Lowe MD;  [facility];  Service: Urology   • TONSILLECTOMY     • US GUIDED BREAST BIOPSY RIGHT COMPLETE Right 06/28/2019       Past Family History  Family History   Problem Relation Age of Onset   • Hyperlipidemia Mother    • Heart disease Mother    • Ovarian cancer Mother 50   • Colon cancer Mother    • Lymphoma Father    • Breast cancer Sister 63   • No Known Problems Brother    • No Known Problems Brother    • No Known Problems Maternal Grandmother    • Bone cancer Maternal Grandfather    • Uterine cancer Paternal Grandmother    • No Known Problems Paternal Grandfather    • No Known Problems Maternal Aunt    • No Known Problems Maternal Aunt    • No Known Problems Maternal Aunt    • No Known Problems Maternal Aunt    • No Known Problems Paternal Aunt    • No Known Problems Paternal Aunt    • No Known Problems Paternal Aunt    • No Known Problems Paternal Aunt        Past Social history  Social History     Socioeconomic History   • Marital status: Single     Spouse name: Not on file   • Number of children: Not on file   • Years of education: Not on file   • Highest education level: Not on file   Occupational History   • Not on file   Tobacco Use   • Smoking status: Never   • Smokeless tobacco: Never   Vaping Use   • Vaping status: Never Used   Substance and Sexual Activity   • Alcohol use: Never   • Drug use: Never   • Sexual activity:  Not Currently   Other Topics Concern   • Not on file   Social History Narrative   • Not on file     Social Determinants of Health     Financial Resource Strain: Low Risk  (9/26/2023)    Overall Financial Resource Strain (CARDIA)    • Difficulty of Paying Living Expenses: Not very hard   Food Insecurity: No Food Insecurity (5/30/2023)    Hunger Vital Sign    • Worried About Running Out of Food in the Last Year: Never true    • Ran Out of Food in the Last Year: Never true   Transportation Needs: No Transportation Needs (9/26/2023)    PRAPARE - Transportation    • Lack of Transportation (Medical): No    • Lack of Transportation (Non-Medical): No   Physical Activity: Not on file   Stress: Not on file   Social Connections: Not on file   Intimate Partner Violence: Not on file   Housing Stability: Low Risk  (5/30/2023)    Housing Stability Vital Sign    • Unable to Pay for Housing in the Last Year: No    • Number of Places Lived in the Last Year: 1    • Unstable Housing in the Last Year: No       Current Medications  Current Outpatient Medications   Medication Sig Dispense Refill   • ARIPiprazole (ABILIFY) 20 MG tablet Take 20 mg by mouth every morning     • BD PosiFlush 0.9 % SOLN      • calcium acetate (PHOSLO) capsule Take 1 capsule (667 mg total) by mouth 2 (two) times a day with meals 180 capsule 3   • Calcium-Magnesium-Vitamin D (CALCIUM 500 PO) Take 1 capsule by mouth daily at bedtime     • cetirizine (ZyrTEC) 10 mg tablet Take 1 tablet (10 mg total) by mouth daily 90 tablet 1   • cholecalciferol (VITAMIN D3) 1,000 units tablet Take 4 tablets (4,000 Units total) by mouth daily (Patient taking differently: Take 4,000 Units by mouth daily at bedtime) 90 tablet 0   • fluticasone (FLONASE) 50 mcg/act nasal spray 1 spray into each nostril daily 15.8 mL 0   • folic acid (FOLVITE) 1 mg tablet Take 1 tablet (1 mg total) by mouth daily (Patient taking differently: Take 1 mg by mouth daily at bedtime)  0   • Multiple Vitamin  (MULTIVITAMIN) tablet Take 1 tablet by mouth every morning     • oxybutynin (DITROPAN XL) 15 MG 24 hr tablet Take 1 tablet (15 mg total) by mouth daily at bedtime 90 tablet 1   • saccharomyces boulardii (FLORASTOR) 250 mg capsule Take 1 capsule (250 mg total) by mouth daily after lunch     • senna (SENOKOT) 8.6 MG tablet Take 1 tablet (8.6 mg total) by mouth 2 (two) times a day as needed for constipation 60 tablet 0   • sodium bicarbonate 650 mg tablet Take 1 tablet (650 mg total) by mouth 3 (three) times a day 270 tablet 3   • sodium chloride, PF, 0.9 % 10 mL by Intracatheter route daily for 90 doses 300 mL 2   • venlafaxine (EFFEXOR-XR) 75 mg 24 hr capsule TAKE 3 CAPS DAILY     • Vibegron (Gemtesa) 75 MG TABS Take 1 capsule by mouth every morning 90 tablet 3   • calcitriol (ROCALTROL) 0.25 mcg capsule Take 1 capsule (0.25 mcg total) by mouth 3 (three) times a week 12 capsule 3   • Cyanocobalamin (VITAMIN B12 PO) Take 1 capsule by mouth daily after lunch (Patient not taking: Reported on 5/22/2024)     • dronabinol (MARINOL) 2.5 mg capsule Take 1 capsule (2.5 mg total) by mouth 2 (two) times a day before meals (Patient not taking: Reported on 5/22/2024) 30 capsule 0   • furosemide (LASIX) 20 mg tablet TAKE 1 TABLET (20 MG TOTAL) BY MOUTH IF NEEDED (FOR INCREASED LEG SWELLING OR WEIGHT GAIN >2 LBS IN 1 DAY) (Patient not taking: Reported on 5/22/2024) 90 tablet 1   • omeprazole (PriLOSEC) 20 mg delayed release capsule Take 20 mg by mouth every morning (Patient not taking: Reported on 5/22/2024)     • sodium chloride, PF, 0.9 % 10 mL by Intracatheter route daily Intracatheter flushing daily. May substitute prefilled syringe with normal saline 10 mL vials, 10 mL syringes, and 18 g blunt needles 300 mL 2   • sodium chloride, PF, 0.9 % 10 mL by Intracatheter route daily for 120 doses Intracatheter flushing daily. May substitute prefilled syringe with normal saline 10 mL vials, 10 mL syringes, and 18 g blunt needles 300 mL  "3   • sodium chloride, PF, 0.9 % 10 mL by Intracatheter route daily for 120 doses Intracatheter flushing daily. May substitute prefilled syringe with normal saline 10 mL vials, 10 mL syringes, and 18 g blunt needles 300 mL 3     No current facility-administered medications for this visit.       Allergies  Allergies   Allergen Reactions   • Cephalosporins Rash     Which Cephalosporin reaction was to not specified; however, has tolerated Amoxicillin, Cefazolin, and Cefepime       Past Medical History, Social History, Family History, medications and allergies were reviewed.    Vitals  Vitals:    05/22/24 1515   BP: 120/78   BP Location: Left arm   Patient Position: Sitting   Cuff Size: Adult   Pulse: 76   SpO2: 97%   Weight: 64.4 kg (142 lb)   Height: 4' 9\" (1.448 m)       Physical Exam  On examination she is in no acute distress.  Abdomen is soft nontender nondistended.  Bilateral nephrostomy tubes are in place.    Results  No results found for: \"PSA\"  Lab Results   Component Value Date    GLUCOSE 219 (H) 12/19/2017    CALCIUM 8.8 04/23/2024     10/27/2017    K 4.4 04/23/2024    CO2 25 04/23/2024    CL 99 04/23/2024    BUN 48 (H) 04/23/2024    CREATININE 2.10 (H) 04/23/2024     Lab Results   Component Value Date    WBC 6.49 04/23/2024    HGB 11.0 (L) 04/23/2024    HCT 36.1 04/23/2024    MCV 96 04/23/2024     04/23/2024       Office Urine Dip  No results found for this or any previous visit (from the past 1 hour(s)).]  "

## 2024-05-23 NOTE — TELEPHONE ENCOUNTER
Verbally confirmed the below appointment information:    Date: 7/29/2024     Arrival Time: 11:30 AM     Visit Type: FOLLOW UP [facility] [[id number]]     Provider: Demetrius Lowe MD [facility]

## 2024-05-31 ENCOUNTER — TELEPHONE (OUTPATIENT)
Dept: HEMATOLOGY ONCOLOGY | Facility: CLINIC | Age: 69
End: 2024-05-31

## 2024-05-31 NOTE — TELEPHONE ENCOUNTER
Appointment Change  Cancel, Reschedule, Change to Virtual      Who are you speaking with? Patient   If it is not the patient, is the caller listed on the communication consent form? N/A   Which provider is the appointment scheduled with? Dr. Rodriguez   When was the original appointment scheduled?    Please list date and time 7/1/24 10:00 AM   At which location is the appointment scheduled to take place? [place]   Was the appointment rescheduled?     Was the appointment changed from an in person visit to a virtual visit?    If so, please list the details of the change. 7/15/24 8:00 AM   What is the reason for the appointment change? Provider requested change due to scheduling conflict.        Was STAR transport scheduled? No   Does STAR transport need to be scheduled for the new visit (if applicable) No   Does the patient need an infusion appointment rescheduled? No   Does the patient have an upcoming infusion appointment scheduled? If so, when? No   Is the patient undergoing chemotherapy? No   For appointments cancelled with less than 24 hours:  Was the no-show policy reviewed? Yes

## 2024-06-03 NOTE — ASSESSMENT & PLAN NOTE
· History of right lower extremity DVT in summer 2019 followed by right internal jugular DVT in 10/2019 for which she was changed to sc lovenox. This was stopped June 2023 by surg onc.   · spoke to her outpatient oncology team --she was not felt to require lifelong anticoagulation as her DVTs were felt to be provoked by her cancer which is now cured Chart(s)/Patient

## 2024-06-05 ENCOUNTER — TELEPHONE (OUTPATIENT)
Dept: HEMATOLOGY ONCOLOGY | Facility: CLINIC | Age: 69
End: 2024-06-05

## 2024-06-05 NOTE — TELEPHONE ENCOUNTER
Appointment Change  Cancel, Reschedule, Change to Virtual      Who are you speaking with? Patient   If it is not the patient, is the caller listed on the communication consent form? N/A   Which provider is the appointment scheduled with? Dr. Rodriguez   When was the original appointment scheduled?    Please list date and time 7/15/24   At which location is the appointment scheduled to take place? [place]   Was the appointment rescheduled?     Was the appointment changed from an in person visit to a virtual visit?    If so, please list the details of the change. 7/19/24   What is the reason for the appointment change? Follow up

## 2024-06-19 DIAGNOSIS — N39.46 MIXED STRESS AND URGE URINARY INCONTINENCE: ICD-10-CM

## 2024-06-19 RX ORDER — OXYBUTYNIN CHLORIDE 15 MG/1
15 TABLET, EXTENDED RELEASE ORAL
Qty: 90 TABLET | Refills: 1 | Status: SHIPPED | OUTPATIENT
Start: 2024-06-19

## 2024-06-19 NOTE — TELEPHONE ENCOUNTER
Reason for call:   [x] Refill   [] Prior Auth  [] Other:     Office:   [] PCP/Provider -   [x] Specialty/Provider -     Medication: oxybutynin (DITROPAN XL) 15 MG    Dose/Frequency:  Take 1 tablet (15 mg total) by mouth daily     Quantity: 90    Pharmacy: [id number] - [id number]        Does the patient have enough for 3 days?   [x] Yes   [] No - Send as HP to POD

## 2024-07-01 ENCOUNTER — TELEPHONE (OUTPATIENT)
Dept: RADIOLOGY | Facility: HOSPITAL | Age: 69
End: 2024-07-01

## 2024-07-12 ENCOUNTER — HOSPITAL ENCOUNTER (OUTPATIENT)
Dept: RADIOLOGY | Facility: HOSPITAL | Age: 69
Discharge: HOME | End: 2024-07-12
Payer: COMMERCIAL

## 2024-07-12 VITALS
SYSTOLIC BLOOD PRESSURE: 126 MMHG | HEIGHT: 57 IN | BODY MASS INDEX: 30.2 KG/M2 | WEIGHT: 140 LBS | TEMPERATURE: 97.1 F | OXYGEN SATURATION: 99 % | HEART RATE: 66 BPM | RESPIRATION RATE: 16 BRPM | DIASTOLIC BLOOD PRESSURE: 62 MMHG

## 2024-07-12 DIAGNOSIS — N13.5 URETERAL STRICTURE: ICD-10-CM

## 2024-07-12 PROCEDURE — C1769 GUIDE WIRE: HCPCS

## 2024-07-12 PROCEDURE — C1729 CATH, DRAINAGE: HCPCS

## 2024-07-12 PROCEDURE — 50435 EXCHANGE NEPHROSTOMY CATH: CPT | Performed by: STUDENT IN AN ORGANIZED HEALTH CARE EDUCATION/TRAINING PROGRAM

## 2024-07-12 PROCEDURE — 99152 MOD SED SAME PHYS/QHP 5/>YRS: CPT

## 2024-07-12 PROCEDURE — 99153 MOD SED SAME PHYS/QHP EA: CPT

## 2024-07-12 PROCEDURE — 50435 EXCHANGE NEPHROSTOMY CATH: CPT

## 2024-07-12 PROCEDURE — 99152 MOD SED SAME PHYS/QHP 5/>YRS: CPT | Performed by: STUDENT IN AN ORGANIZED HEALTH CARE EDUCATION/TRAINING PROGRAM

## 2024-07-12 RX ORDER — FENTANYL CITRATE 50 UG/ML
INJECTION, SOLUTION INTRAMUSCULAR; INTRAVENOUS AS NEEDED
Status: DISCONTINUED | OUTPATIENT
Start: 2024-07-12 | End: 2024-07-13 | Stop reason: HOSPADM

## 2024-07-12 RX ORDER — SODIUM CHLORIDE 9 MG/ML
75 INJECTION, SOLUTION INTRAVENOUS CONTINUOUS
Status: DISCONTINUED | OUTPATIENT
Start: 2024-07-12 | End: 2024-07-13 | Stop reason: HOSPADM

## 2024-07-12 RX ORDER — LIDOCAINE WITH 8.4% SOD BICARB 0.9%(10ML)
SYRINGE (ML) INJECTION AS NEEDED
Status: DISCONTINUED | OUTPATIENT
Start: 2024-07-12 | End: 2024-07-13 | Stop reason: HOSPADM

## 2024-07-12 RX ORDER — MIDAZOLAM HYDROCHLORIDE 2 MG/2ML
INJECTION, SOLUTION INTRAMUSCULAR; INTRAVENOUS AS NEEDED
Status: DISCONTINUED | OUTPATIENT
Start: 2024-07-12 | End: 2024-07-13 | Stop reason: HOSPADM

## 2024-07-12 RX ADMIN — MIDAZOLAM 1 MG: 1 INJECTION INTRAMUSCULAR; INTRAVENOUS at 10:41

## 2024-07-12 RX ADMIN — IOHEXOL 20 ML: 350 INJECTION, SOLUTION INTRAVENOUS at 11:07

## 2024-07-12 RX ADMIN — FENTANYL CITRATE 50 MCG: 50 INJECTION INTRAMUSCULAR; INTRAVENOUS at 10:41

## 2024-07-12 RX ADMIN — Medication 5 ML: at 10:44

## 2024-07-12 NOTE — DISCHARGE INSTRUCTIONS
Percutaneous Nephroureteral Care     WHAT YOU NEED TO KNOW:   A percutaneous nephroureteral tube is a catheter (thin plastic tube) that is inserted through your skin and into your kidney and bladder. The percutaneous nephroureteral (PCNU) tube drains urine from your kidney into your bladder and also into a collecting bag outside your body. You may need a PCNU tube when something is blocking the normal flow of urine. A PCNU tube may be used for a short or a long period of time. The PCNU tube comes out of your back, so you will need someone to help care for your PCNU tube.        DISCHARGE INSTRUCTIONS:      How to clean the skin around the PCNU tube and change the bandage:  Since the PCNU tube comes out of your back, you will not be able to care for it by yourself. Ask someone to follow the general directions below to check and care for your PCNU tube.   Gather the items you will need.          Disposable (single use) under-pad, and a clean washcloth  Plain soap, warm water, and new medical gloves  Sterile gauze bandages  Clear adhesive dressing or medical tape  Skin barrier  Protective skin film  Trash bag  Remove the old bandage, and check the tube entry site.    Have the patient lie on his side with the PCNU tube entry site facing up. Place the under-pad where it will catch drainage as you are working with the PCNU tube.   Wash your hands with soap and water. Put on new medical gloves.  Gently remove the old bandage, without pulling on the tube. Do this by holding the skin beside the tube with one hand. With the other hand, gently remove sticky tape and the skin barrier by pulling in the same direction as hair growth. Do not touch the side of the bandage that is placed over or around the tube. Throw the bandage and skin barrier away in a trash bag.  Look for signs of infection, such as skin redness and swelling. Report any skin changes to healthcare providers.  Clean the tube entry site.    Hold the tube in  place to keep it from being pulled out while you are cleaning around it.  You will need to clean the area twice. For the first cleaning, wet a new gauze bandage with soap and water.  Begin at the entry site of the tube. Wipe the skin in circles, moving away from the entry site. Remove blood and any other material with the gauze. Do this as often as needed. Use a new gauze bandage each time you clean the area, moving away from the entry site.   For the second cleaning, wet a new gauze bandage with water. Begin at the entry site of the tube. Wipe the skin in circles, moving away from the entry site. Use a new gauze bandage each time you clean the area, moving away from the entry site.   Gently pat the skin with a clean washcloth to dry it.    Apply the skin barrier and bandages.    Roll up a bandage to make it thick, and place it under  the place where the tube enters the skin. Place it to support the tube, and stop it from kinking or bending. Tape the bandage in place, and apply more bandages if directed by a healthcare provider.   Bring the tubing forward to the front and tape it to the skin. Do not stretch the tube tight, because this may pull the nephrostomy tube out.  How often to change the bandage.  Change the bandage around the tube, every other day. If your bandages  get dirty or wet, change them right away, and as often as needed. If your nephrostomy tube is to be used for a long period of time, the tube needs to be changed every 2 to 3 months. Healthcare providers will tell you when you need to make an appointment to have your tube changed.     How to care for the urine drainage bag:   Ask if you need to measure and write down how much urine is in the bag before you empty it. Drain urine out of the drainage bag when it is ½ to ? full. Open the spout at the bottom of the bag to empty the urine into the toilet.   You may need to detach the drainage bag from the nephrostomy tube to change it.. If so, attach a  new drainage bag tightly to the nephrostomy tube.     How to prevent problems with your nephrostomy tube:   Change bandages, directed.  This helps to prevent infection. Throw away or clean your drainage bag as directed by your healthcare provider.    Wipe the connecting ends of the drainage bag with alcohol before you reconnect the bag to the tube.  This helps prevent infection.     Keep the tube taped to your skin and connected to a drainage bag placed below the level of your kidneys.  This helps prevent urine from backing up into your kidneys. You may wear a small drainage bag strapped to your leg to let you move around more easily.    Check the catheter to be sure it is in place after you change your clothes or do other activities.  Do not wear tight clothing over the tube. Place the tubing over your thigh rather than under it when you are sitting down. Be sure that nothing is pulling on the nephrostomy tube when you move around.    Change positions if you see little or no urine in your drainage bag.  Check to see if the urine tube is twisted or bent. Be sure that you are not sitting or lying on the tube. If there are no kinks and there is little or no urine in the drainage bag, tell your healthcare provider.    Flush out the tube as directed. Some tubes get flushed one time a day with 10 mls of NSS You will be given a prescription for the flushes.  To flush the nephrostomy tube, clean both connections with alcohol swap. Twist off the drainage bag tube and twist the saline syringe into the nephrostomy tube and flush briskly. Remove the syringe and twist the drainage bag tube back into the nephrostomy tube.  Keep the site covered while you shower.  Tape a piece of clear adhesive plastic over the dressing to keep it dry while you shower. Do not take tub baths.    Tube Capping Trial        If your tube is capped and has no drainage bag, the urine will flow through the ureter and into the bladder for you to urinate  normally. This is called a capping trial. Continue to flush your tube one time per day. You will have an extra drainage bag to keep at home in case the capping trial fails. Place the drainage bag back onto the tube if you're experiencing pain in your back (kidney pain), develop fever, chills. Call the IR department if you experience any of the following: Pain, fever greater than 101. Persistent nausea or vomiting.    Contact Interventional Radiology at [phone] ([facility]ING PATIENTS: Contact Interventional Radiology at [phone]) ([facility]HAEL PATIENTS: Contact Interventional Radiology at [phone]) if:  The skin around the nephrostomy tube is red, swollen, itches, or has a rash.   You have a fever greater than 101 or chills.  You have lower back or hip pain.  There are changes in how your urine looks or smells.  You have little or no urine draining from the nephrostomy tube.   You have nausea and are vomiting.  The black bernardo on your tube has moved, or the tube is longer than when it was put in.   You have questions or concerns about your condition or care.  The nephrostomy tube comes out completely.   There is blood, pus, or a bad smell coming from the place where the tube enters your skin.  Urine is leaking around the tube.        The following pharmacies carry the flush syringes.       [facility]                     [facility]                         [facility]       [address]                     [address] [facility]                    [phone]  [place]                       [place]  Phone [phone]            Phone [phone]                 [facility]                                                                                                   [phone]  [facility]             [facility]                             [address] [facility] [facility]   [place] [place]                       [place]                                 [phone]  Phone [phone]            Phone [phone]    [facility]                                                                         [facility] [phone]  [address]   Phone [phone]

## 2024-07-12 NOTE — SEDATION DOCUMENTATION
Procedure completed by Dr Ames. Pt tolerated without issues, VSS. Education provided to pt prior to and throughout procedure, questions answered as offered. Tubes sutured, dry dressing to site. Transported back to GI Pre for recovery, bedside report given.

## 2024-07-12 NOTE — BRIEF OP NOTE (RAD/CATH)
INTERVENTIONAL RADIOLOGY PROCEDURE NOTE    Date: 7/12/2024    Procedure:   Procedure Summary       Date: 07/12/24 Room / Location: [facility] Interventional Radiology    Anesthesia Start:  Anesthesia Stop:     Procedure: IR NEPHROSTOMY TUBE CHECK/CHANGE/REPOSITION/REINSERTION/UPSIZE Diagnosis:       Ureteral stricture      (Routine exchange of bilateral nephrostomy catheters)    Scheduled Providers:  Responsible Provider:     Anesthesia Type: Not recorded ASA Status: Not recorded            Preoperative diagnosis:   1. Ureteral stricture         Postoperative diagnosis: Same.    Surgeon: Ish Ames MD     Assistant: None. No qualified resident was available.    Blood loss: Minimal    Specimens: None     Findings:   Uncomplicated bilateral nephrostomy tube exchange under sedation.    Complications: None immediate.    Anesthesia: conscious sedation

## 2024-07-16 ENCOUNTER — TELEPHONE (OUTPATIENT)
Age: 69
End: 2024-07-16

## 2024-07-16 DIAGNOSIS — N13.30 HYDRONEPHROSIS: ICD-10-CM

## 2024-07-16 DIAGNOSIS — Z12.31 ENCOUNTER FOR SCREENING MAMMOGRAM FOR BREAST CANCER: Primary | ICD-10-CM

## 2024-07-16 DIAGNOSIS — N17.9 ACUTE KIDNEY INJURY (HCC): ICD-10-CM

## 2024-07-16 DIAGNOSIS — N18.9 CHRONIC KIDNEY DISEASE, UNSPECIFIED CKD STAGE: ICD-10-CM

## 2024-07-16 RX ORDER — SODIUM CHLORIDE 9 MG/ML
10 INJECTION, SOLUTION INTRAMUSCULAR; INTRAVENOUS; SUBCUTANEOUS DAILY
Qty: 300 ML | Refills: 0 | Status: SHIPPED | OUTPATIENT
Start: 2024-07-16 | End: 2024-10-14

## 2024-07-16 NOTE — TELEPHONE ENCOUNTER
Patient is requesting a routine mammogram referral Will be scheduling with [facility] Please advise

## 2024-07-18 NOTE — PROGRESS NOTES
Assessment/Plan:    Problem List Items Addressed This Visit       Encounter for follow-up surveillance of endometrial cancer - Primary     68-year-old with h/o recurrent stage IB endometrial cancer in remission presents for follow up. No evidence of disease by history and physical examination today; however, CT ordered to ensure no recurrence in the setting of conduit surgical planning. Plan to follow-up in 6 months. Reviewed signs and symptoms that would prompted sooner evaluation.         Relevant Orders    CT chest abdomen pelvis wo contrast    Nephrostomy status ([facility])     Followed by Dr. Lowe with plan for potential ilial conduit, leaving bladder in situ. She has been previously counseled regarding options and potential outcomes including permanent PCNs, ileal conduit, failed ileal conduit, etc.  Encouraged to proceed with appointment with Dr. Lowe later this month and CT ordered to be done prior to that appointment. Dr. Rodriguez can be present at the time of that procedure.            CHIEF COMPLAINT: follow up     Problem:   Cancer Staging   Endometrial cancer ([facility])  Staging form: Corpus Uteri - Carcinoma, AJCC 7th Edition  - Clinical stage from 12/19/2017: FIGO Stage IB (T1b, N0, M0) - Signed by Evgeny So MD on 2/12/2018    Previous therapy:  Oncology History   Endometrial cancer ([facility])   11/17/2017 Initial Diagnosis    Endometrial cancer ([facility])     11/17/2017 Biopsy    ENDOMETRIAL BIOPSY: WELL DIFFERENTIATED ENDOMETRIAL ADENOCARCINOMA (FIGO I) WITH FOCALMUCINOUS FEATURES.    Part B: ENDOCERVICAL POLYP:BENIGN ENDOCERVICAL      12/19/2017 Surgery    Robotic assisted total laparoscopic hysterectomy with bilateral salpingo-oophorectomy and sentinel bilateral pelvic lymph node dissection. 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 (4.4 x 3.2 cm tumor, 9.4/15.4mm invasion, NO LVSI, washings revealed atypical cellular changes)     12/19/2017 Genetic Testing    Morrison testing negative      6/28/2019 Biopsy    A. Breast, Right, US BX Right Breast 1000 4 cmfn:  - Benign breast tissue with focal histiocytic aggregate.  See comment.  - Negative for atypia and in-situ or invasive carcinoma.     7/8/2019 Recurrence    Presented with right lower extremity DVT and CT demonstrating right pelvic sidewall mass with venous, ureteral and nerve compression causing significant neuropathic pain.      Biopsy:  Lymph Node, Right pelvic lymph node x3:  - High-grade adenocarcinoma.      7/30/2019 - 1/6/2020 Chemotherapy    Taxol 175 mg/m2 and carboplatin AUC 6 every 21 days. Dose was reduced to taxol 135 mg/m2 and carboplatin AUC 5. Completed 6 cycles.  Treatment protracted due to multiple hospital admissions.     2/24/2020 - 4/13/2020 Radiation    adjuvant external beam radiation therapy to the whole pelvis to 4500 cGy followed by boost to gross disease of an additional 2200 cGy     11/23/2020 Progression    New necrotic adenopathy in the retroperitoneum on CT.     12/21/2020 - 12/5/2022 Chemotherapy    Began chemotherapy with rucaparib, atezolizumab, and bevacizumab as per EndoBARR clinical trial.    Rucaparib held cycle 28 secondary to fatigue  Avastin held since cycle 30 for elevated UPC  Treatment held for one cycle after cycle 31 for mucositis/fatigue             Patient ID: Ragini Melendez is a 68 y.o. female  68-year-old with h/o recurrent stage IB endometrial cancer in remission presents for follow up. Pt has been followed closely with urology regarding chronic indwelling PCNs as well as CKD with likely future need for transplant. Today she reports that she is overall doing well. She has questions about potential ilial conduit procedure with Dr. Lowe. She notes that she does have urinary incontinence even with her PCNs and is looking forward to that problem being resolved in addition to her PCNs removed. No change appetite or bowel/bladder habits. No abdominal pain. No vaginal bleeding. No nausea or  vomiting.       The following portions of the patient's history were reviewed and updated as appropriate: allergies, current medications, past family history, past medical history, past social history, past surgical history, and problem list.    Review of Systems    Current Outpatient Medications   Medication Sig Dispense Refill    ARIPiprazole (ABILIFY) 20 MG tablet Take 20 mg by mouth every morning      BD PosiFlush 0.9 % SOLN       calcitriol (ROCALTROL) 0.25 mcg capsule Take 1 capsule (0.25 mcg total) by mouth 3 (three) times a week 12 capsule 3    calcium acetate (PHOSLO) capsule Take 1 capsule (667 mg total) by mouth 2 (two) times a day with meals 180 capsule 3    Calcium-Magnesium-Vitamin D (CALCIUM 500 PO) Take 1 capsule by mouth daily at bedtime      cetirizine (ZyrTEC) 10 mg tablet Take 1 tablet (10 mg total) by mouth daily 90 tablet 1    cholecalciferol (VITAMIN D3) 1,000 units tablet Take 4 tablets (4,000 Units total) by mouth daily (Patient taking differently: Take 4,000 Units by mouth daily at bedtime) 90 tablet 0    fluticasone (FLONASE) 50 mcg/act nasal spray 1 spray into each nostril daily 15.8 mL 0    folic acid (FOLVITE) 1 mg tablet Take 1 tablet (1 mg total) by mouth daily (Patient taking differently: Take 1 mg by mouth daily at bedtime)  0    Multiple Vitamin (MULTIVITAMIN) tablet Take 1 tablet by mouth every morning      oxybutynin (DITROPAN XL) 15 MG 24 hr tablet Take 1 tablet (15 mg total) by mouth daily at bedtime 90 tablet 1    sodium bicarbonate 650 mg tablet Take 1 tablet (650 mg total) by mouth 3 (three) times a day 270 tablet 3    sodium chloride, PF, 0.9 % 10 mL by Intracatheter route daily Intracatheter flushing daily. May substitute prefilled syringe with normal saline 10 mL vials, 10 mL syringes, and 18 g blunt needles 300 mL 0    venlafaxine (EFFEXOR-XR) 75 mg 24 hr capsule TAKE 3 CAPS DAILY      Vibegron (Gemtesa) 75 MG TABS Take 1 capsule by mouth every morning 90 tablet 3     "Cyanocobalamin (VITAMIN B12 PO) Take 1 capsule by mouth daily after lunch (Patient not taking: Reported on 5/22/2024)      dronabinol (MARINOL) 2.5 mg capsule Take 1 capsule (2.5 mg total) by mouth 2 (two) times a day before meals (Patient not taking: Reported on 5/22/2024) 30 capsule 0    furosemide (LASIX) 20 mg tablet TAKE 1 TABLET (20 MG TOTAL) BY MOUTH IF NEEDED (FOR INCREASED LEG SWELLING OR WEIGHT GAIN >2 LBS IN 1 DAY) (Patient not taking: Reported on 5/22/2024) 90 tablet 1    omeprazole (PriLOSEC) 20 mg delayed release capsule Take 20 mg by mouth every morning (Patient not taking: Reported on 5/22/2024)      saccharomyces boulardii (FLORASTOR) 250 mg capsule Take 1 capsule (250 mg total) by mouth daily after lunch      senna (SENOKOT) 8.6 MG tablet Take 1 tablet (8.6 mg total) by mouth 2 (two) times a day as needed for constipation (Patient not taking: Reported on 7/19/2024) 60 tablet 0    sodium chloride, PF, 0.9 % 10 mL by Intracatheter route daily for 120 doses Intracatheter flushing daily. May substitute prefilled syringe with normal saline 10 mL vials, 10 mL syringes, and 18 g blunt needles 300 mL 3    sodium chloride, PF, 0.9 % 10 mL by Intracatheter route daily for 120 doses Intracatheter flushing daily. May substitute prefilled syringe with normal saline 10 mL vials, 10 mL syringes, and 18 g blunt needles 300 mL 3    sodium chloride, PF, 0.9 % 10 mL by Intracatheter route daily for 90 doses 300 mL 2     No current facility-administered medications for this visit.           Objective:    Blood pressure 134/84, pulse 72, resp. rate 16, height 4' 9\" (1.448 m), weight 69.4 kg (153 lb), SpO2 98%, not currently breastfeeding.  Body mass index is 33.11 kg/m².  Body surface area is 1.6 meters squared.    Physical Exam  Vitals and nursing note reviewed.   Constitutional:       Appearance: Normal appearance.   HENT:      Head: Normocephalic and atraumatic.   Cardiovascular:      Rate and Rhythm: Normal rate " "and regular rhythm.   Pulmonary:      Effort: Pulmonary effort is normal. No respiratory distress.   Abdominal:      General: There is no distension.      Palpations: Abdomen is soft. There is no mass.      Tenderness: There is no abdominal tenderness. There is no guarding or rebound.   Genitourinary:     Comments: Normal vulva. Vagina with atrophy and post radiation changes. No visible lesions. No fullness or palpable tumors on bimanual exam.   Skin:     General: Skin is warm and dry.   Neurological:      Mental Status: She is alert.       Lab Results   Component Value Date     10/27/2017    K 4.4 04/23/2024    CL 99 04/23/2024    CO2 25 04/23/2024    BUN 48 (H) 04/23/2024    CREATININE 2.10 (H) 04/23/2024    GLUCOSE 219 (H) 12/19/2017    GLUF 125 (H) 11/16/2023    CALCIUM 8.8 04/23/2024    CORRECTEDCA 9.8 07/27/2023    AST 21 04/23/2024    ALT 32 04/23/2024    ALKPHOS 133 (H) 04/23/2024    PROT 6.9 10/27/2017    BILITOT 0.3 10/27/2017    EGFR 23 04/23/2024     Lab Results   Component Value Date    WBC 6.49 04/23/2024    HGB 11.0 (L) 04/23/2024    HCT 36.1 04/23/2024    MCV 96 04/23/2024     04/23/2024     Lab Results   Component Value Date    NEUTROABS 5.01 04/23/2024        Trend:  No results found for: \"\"     I have spent a total time of 20 minutes in caring for this patient on the day of the visit/encounter including Diagnostic results, Prognosis, Risks and benefits of tx options, and Patient and family education.    Patient seen and discussed with Dr. Rodriguez.    Abbey Burris  Gynecologic Oncology Fellow      "

## 2024-07-18 NOTE — ASSESSMENT & PLAN NOTE
68-year-old with h/o recurrent stage IB endometrial cancer in remission presents for follow up. No evidence of disease by history and physical examination today; however, CT ordered to ensure no recurrence in the setting of conduit surgical planning. Plan to follow-up in 6 months. Reviewed signs and symptoms that would prompted sooner evaluation.

## 2024-07-19 ENCOUNTER — OFFICE VISIT (OUTPATIENT)
Dept: GYNECOLOGIC ONCOLOGY | Facility: CLINIC | Age: 69
End: 2024-07-19
Payer: COMMERCIAL

## 2024-07-19 VITALS
HEART RATE: 72 BPM | SYSTOLIC BLOOD PRESSURE: 134 MMHG | RESPIRATION RATE: 16 BRPM | OXYGEN SATURATION: 98 % | BODY MASS INDEX: 33.01 KG/M2 | DIASTOLIC BLOOD PRESSURE: 84 MMHG | WEIGHT: 153 LBS | HEIGHT: 57 IN

## 2024-07-19 DIAGNOSIS — Z85.42 ENCOUNTER FOR FOLLOW-UP SURVEILLANCE OF ENDOMETRIAL CANCER: Primary | ICD-10-CM

## 2024-07-19 DIAGNOSIS — Z08 ENCOUNTER FOR FOLLOW-UP SURVEILLANCE OF ENDOMETRIAL CANCER: Primary | ICD-10-CM

## 2024-07-19 DIAGNOSIS — Z93.6 NEPHROSTOMY STATUS (HCC): ICD-10-CM

## 2024-07-19 PROCEDURE — G2211 COMPLEX E/M VISIT ADD ON: HCPCS | Performed by: OBSTETRICS & GYNECOLOGY

## 2024-07-19 PROCEDURE — 99459 PELVIC EXAMINATION: CPT | Performed by: OBSTETRICS & GYNECOLOGY

## 2024-07-19 PROCEDURE — 99213 OFFICE O/P EST LOW 20 MIN: CPT | Performed by: OBSTETRICS & GYNECOLOGY

## 2024-07-19 NOTE — ASSESSMENT & PLAN NOTE
Followed by Dr. Lowe with plan for potential ilial conduit, leaving bladder in situ. She has been previously counseled regarding options and potential outcomes including permanent PCNs, ileal conduit, failed ileal conduit, etc.  Encouraged to proceed with appointment with Dr. Lowe later this month and CT ordered to be done prior to that appointment. Dr. Rodriguez can be present at the time of that procedure.

## 2024-07-25 ENCOUNTER — HOSPITAL ENCOUNTER (OUTPATIENT)
Dept: CT IMAGING | Facility: HOSPITAL | Age: 69
Discharge: HOME | End: 2024-07-25
Attending: STUDENT IN AN ORGANIZED HEALTH CARE EDUCATION/TRAINING PROGRAM
Payer: COMMERCIAL

## 2024-07-25 DIAGNOSIS — Z85.42 ENCOUNTER FOR FOLLOW-UP SURVEILLANCE OF ENDOMETRIAL CANCER: ICD-10-CM

## 2024-07-25 DIAGNOSIS — Z08 ENCOUNTER FOR FOLLOW-UP SURVEILLANCE OF ENDOMETRIAL CANCER: ICD-10-CM

## 2024-07-25 PROCEDURE — 71250 CT THORAX DX C-: CPT

## 2024-07-25 PROCEDURE — 74176 CT ABD & PELVIS W/O CONTRAST: CPT

## 2024-07-29 ENCOUNTER — OFFICE VISIT (OUTPATIENT)
Dept: UROLOGY | Facility: AMBULATORY SURGERY CENTER | Age: 69
End: 2024-07-29
Payer: COMMERCIAL

## 2024-07-29 VITALS
WEIGHT: 154 LBS | OXYGEN SATURATION: 98 % | BODY MASS INDEX: 33.22 KG/M2 | DIASTOLIC BLOOD PRESSURE: 74 MMHG | HEART RATE: 62 BPM | HEIGHT: 57 IN | SYSTOLIC BLOOD PRESSURE: 128 MMHG

## 2024-07-29 DIAGNOSIS — N13.5 URETERAL STRICTURE: Primary | ICD-10-CM

## 2024-07-29 PROCEDURE — 1159F MED LIST DOCD IN RCRD: CPT | Performed by: UROLOGY

## 2024-07-29 PROCEDURE — 99215 OFFICE O/P EST HI 40 MIN: CPT | Performed by: UROLOGY

## 2024-07-29 PROCEDURE — 1160F RVW MEDS BY RX/DR IN RCRD: CPT | Performed by: UROLOGY

## 2024-07-29 RX ORDER — METRONIDAZOLE 500 MG/100ML
500 INJECTION, SOLUTION INTRAVENOUS ONCE
OUTPATIENT
Start: 2024-07-29 | End: 2024-07-29

## 2024-07-29 RX ORDER — LEVOFLOXACIN 5 MG/ML
500 INJECTION, SOLUTION INTRAVENOUS ONCE
OUTPATIENT
Start: 2024-07-29 | End: 2024-07-29

## 2024-07-29 RX ORDER — SODIUM CHLORIDE 9 MG/ML
125 INJECTION, SOLUTION INTRAVENOUS CONTINUOUS
OUTPATIENT
Start: 2024-07-29

## 2024-07-29 NOTE — LETTER
July 29, 2024     Emely Yost DO  [address] [facility] [address]    Patient: Ragini Melendez   YOB: 1955   Date of Visit: 7/29/2024       Dear Dr. Yost:    Thank you for referring Ragini Melendez to me for evaluation. Below are my notes for this consultation.    If you have questions, please do not hesitate to call me. I look forward to following your patient along with you.         Sincerely,        Demetrius Lowe MD        CC: MD Demetrius Delgado MD  7/29/2024 12:57 PM  Sign when Signing Visit  7/29/2024    Ragini Melendez  1955  [id number]    1. Ureteral stricture  Assessment & Plan:  Impression: History of endometrial cancer, status post hysterectomy with chemotherapy and radiation, bilateral distal ureteral strictures, bilateral indwelling nephrostomy tubes    Plan: I had a lengthy discussion again with Ragini and her sister via speaker phone in the office today regarding her bilateral nephrostomies and distal ureteral strictures.  We discussed the challenges that can be associated with ileal conduit urinary diversion in a patient who has previously had pelvic surgery along with pelvic radiation.  We discussed the risk of an ileal conduit with progressive renal failure and ultimately the need for dialysis and a possible renal transplant.  We discussed that the surgery itself may be technically impossible to perform based on intraoperative findings at the time of surgery and the bilateral nephrostomy tubes may ultimately be a permanent solution for her.  I will discuss this case further with Dr. Rodriguez and pending her input we will plan to schedule her for an open ileal conduit creation/urinary diversion/ureterolysis.  The patient is amenable with this plan and will return to the office 2 weeks prior to surgery for additional discussion, H&P, and consent.  Orders:  -     Case request operating room: ILEAL CONDUIT urinary diversion (No  cystectomy); Standing  -     Case request operating room: ILEAL CONDUIT urinary diversion (No cystectomy)       History of Present Illness  68 y.o. female with a history of T1b endometrial carcinoma status post chemoradiation with surgery.  She has bilateral distal ureteral strictures which have been managed previously with stents but now exclusively with nephrostomy tubes as she cannot tolerate stents alone secondary to infection/renal compromise.  Her most recent creatinine level is noted to be 2.1 from April 2024.  Prior retrograde pyelogram show marked narrowing of the distal ureters from the pelvic brim down.  We have talked on many occasion about a ureteral reimplant versus ileal conduit urinary diversion.  She is most interested in an ileal conduit.  We discussed leaving her native bladder in situ in case she were ever to require or need a renal transplant in the future.  Fortunately her most recent CT scan of the chest abdomen pelvis shows no evidence of recurrent disease.  She returns to the office today for additional discussion regarding possible urinary diversion.      AUA Symptom Score      Review of Systems    Past Medical History  Past Medical History:   Diagnosis Date   • Anemia    • Anxiety    • Cancer (HCC)     ENDOMETRIAL   • Chemotherapy induced neutropenia (HCC) 8/30/2019   • CKD (chronic kidney disease) stage 3, GFR 30-59 ml/min (HCC)    • COVID     Fall 2022   • Depression    • DVT (deep venous thrombosis) ([facility]) 07/19/2019    RIGHT LEG   • Endometrial cancer (HCC) 12/2017   • GERD (gastroesophageal reflux disease)    • H/O gastric bypass    • Hard to intubate     pt denies AS OF 7/25/23   • History of chemotherapy     started 12/2021endometrial cancer- done 12/23/22   • History of DVT in adulthood     RLE   • History of transfusion 04/13/2023   • Hyperglycemia     vx type 2 dm -- last assessed 4/1/14; resolved 11/7/17   • Hyperlipidemia    • Hypertension     in past- no meds at present   •  Iron deficiency anemia     iron infusions weekly   • OAB (overactive bladder)    • Port-A-Cath in place    • Wears glasses        Past Social History  Past Surgical History:   Procedure Laterality Date   • ABDOMINAL SURGERY      GASTRIC BYPASS; 2001   • BREAST BIOPSY Right 06/28/2019    core biopsy; benign   • CHOLECYSTECTOMY      at the time of gastric bypass   • COLONOSCOPY     • CT NEEDLE BIOPSY LYMPH NODE  07/08/2019   • FL GUIDED CENTRAL VENOUS ACCESS DEVICE INSERTION  11/12/2019   • FL RETROGRADE PYELOGRAM  03/30/2023   • FL RETROGRADE PYELOGRAM  05/09/2023   • FL RETROGRADE PYELOGRAM  8/1/2023   • GASTRIC BYPASS     • HYSTERECTOMY Bilateral 2017    total abdominal with salpingo-oophorectomy   • IR NEPHROSTOMY TO NEPHROURETERAL STENT  06/09/2022   • IR NEPHROSTOMY TO NEPHROURETERAL STENT  12/20/2022   • IR NEPHROSTOMY TO NEPHROURETERAL STENT  8/8/2023   • IR NEPHROSTOMY TUBE CHECK/CHANGE/REPOSITION/REINSERTION/UPSIZE  05/27/2022   • IR NEPHROSTOMY TUBE CHECK/CHANGE/REPOSITION/REINSERTION/UPSIZE  11/10/2023   • IR NEPHROSTOMY TUBE CHECK/CHANGE/REPOSITION/REINSERTION/UPSIZE  2/9/2024   • IR NEPHROSTOMY TUBE CHECK/CHANGE/REPOSITION/REINSERTION/UPSIZE  4/12/2024   • IR NEPHROSTOMY TUBE CHECK/CHANGE/REPOSITION/REINSERTION/UPSIZE  7/12/2024   • IR NEPHROSTOMY TUBE PLACEMENT  07/26/2019   • IR NEPHROSTOMY TUBE PLACEMENT  05/27/2023   • IR NEPHROURETERAL STENT CHECK/CHANGE/REPOSITION  04/06/2021   • IR NEPHROURETERAL STENT CHECK/CHANGE/REPOSITION  06/04/2021   • IR NEPHROURETERAL STENT CHECK/CHANGE/REPOSITION  09/03/2021   • IR NEPHROURETERAL STENT CHECK/CHANGE/REPOSITION  12/07/2021   • IR NEPHROURETERAL STENT CHECK/CHANGE/REPOSITION  03/08/2022   • IR NEPHROURETERAL STENT CHECK/CHANGE/REPOSITION  05/10/2022   • IR NEPHROURETERAL STENT CHECK/CHANGE/REPOSITION  09/19/2022   • IR PICC LINE  09/27/2019   • IR PORT PLACEMENT  07/26/2019   • IR PORT PLACEMENT     • IR PORT REMOVAL  09/20/2019   • OOPHORECTOMY Bilateral  2017   • TX CYSTO BLADDER W/URETERAL CATHETERIZATION Right 03/30/2023    Procedure: CYSTOSCOPY RETROGRADE PYELOGRAM WITH exchange of STENT URETERAL;  Surgeon: Demetrius Lowe MD;  [facility]: [facility];  Service: Urology   • TX CYSTO BLADDER W/URETERAL CATHETERIZATION Bilateral 05/09/2023    Procedure: CYSTOSCOPY, BILATERAL RETROGRADE PYELOGRAM, WITH LEFT INSERTION STENT URETERAL, RIGHT URTERAL STENT EXCHANGE;  Surgeon: Nicola Hannah MD;  [facility];  Service: Urology   • TX CYSTO BLADDER W/URETERAL CATHETERIZATION Bilateral 8/1/2023    Procedure: CYSTOSCOPY BILATERAL RETROGRADE  WITH REMOVAL BILATERAL  STENT URETERAL;  Surgeon: Demetrius Lowe MD;  [facility];  Service: Urology   • TX CYSTO W/INSERT URETERAL STENT Right 03/30/2023    Procedure: EXCHANGE STENT URETERAL;  Surgeon: Demetrius Loew MD;  [facility];  Service: Urology   • TX INSJ TUNNELED CTR VAD W/SUBQ PORT AGE 5 YR/> Left 11/12/2019    Procedure: INSERTION VENOUS PORT ( PORT-A-CATH) IR;  Surgeon: Edilberto Guzman DO;  [facility];  Service: Interventional Radiology   • TX LAPS ABD PRTM&OMENTUM DX W/WO SPEC BR/WA SPX N/A 12/19/2017    Procedure: LAPAROSCOPY DIAGNOSTIC;  Surgeon: Evgeny So MD;  [facility];  Service: Gynecology Oncology   • TX LAPS W/RAD HYST W/BILAT LMPHADEC RMVL TUBE/OVARY N/A 12/19/2017    Procedure: HYSTERECTOMY LAPAROSCOPIC TOTAL (LTH) W/ ROBOTICS; BILATERAL SALPINGOOPHERECTOMY; LYMPH NODE DISSECTION; lysis of adhesions;  Surgeon: Evgeny So MD;  [facility];  Service: Gynecology Oncology   • REMOVAL URETERAL STENT Bilateral 8/1/2023    Procedure: REMOVAL STENT URETERAL;  Surgeon: Demetrius Lowe MD;  [facility];  Service: Urology   • TONSILLECTOMY     • US GUIDED BREAST BIOPSY RIGHT COMPLETE Right 06/28/2019       Past Family History  Family History   Problem Relation Age of Onset   • Hyperlipidemia Mother    • Heart  disease Mother    • Ovarian cancer Mother 50   • Colon cancer Mother    • Lymphoma Father    • Breast cancer Sister 63   • No Known Problems Brother    • No Known Problems Brother    • No Known Problems Maternal Grandmother    • Bone cancer Maternal Grandfather    • Uterine cancer Paternal Grandmother    • No Known Problems Paternal Grandfather    • No Known Problems Maternal Aunt    • No Known Problems Maternal Aunt    • No Known Problems Maternal Aunt    • No Known Problems Maternal Aunt    • No Known Problems Paternal Aunt    • No Known Problems Paternal Aunt    • No Known Problems Paternal Aunt    • No Known Problems Paternal Aunt        Past Social history  Social History     Socioeconomic History   • Marital status: Single     Spouse name: Not on file   • Number of children: Not on file   • Years of education: Not on file   • Highest education level: Not on file   Occupational History   • Not on file   Tobacco Use   • Smoking status: Never   • Smokeless tobacco: Never   Vaping Use   • Vaping status: Never Used   Substance and Sexual Activity   • Alcohol use: Never   • Drug use: Never   • Sexual activity: Not Currently   Other Topics Concern   • Not on file   Social History Narrative   • Not on file     Social Determinants of Health     Financial Resource Strain: Low Risk  (9/26/2023)    Overall Financial Resource Strain (CARDIA)    • Difficulty of Paying Living Expenses: Not very hard   Food Insecurity: No Food Insecurity (5/30/2023)    Hunger Vital Sign    • Worried About Running Out of Food in the Last Year: Never true    • Ran Out of Food in the Last Year: Never true   Transportation Needs: No Transportation Needs (9/26/2023)    PRAPARE - Transportation    • Lack of Transportation (Medical): No    • Lack of Transportation (Non-Medical): No   Physical Activity: Not on file   Stress: Not on file   Social Connections: Not on file   Intimate Partner Violence: Not on file   Housing Stability: Low Risk   (5/30/2023)    Housing Stability Vital Sign    • Unable to Pay for Housing in the Last Year: No    • Number of Places Lived in the Last Year: 1    • Unstable Housing in the Last Year: No       Current Medications  Current Outpatient Medications   Medication Sig Dispense Refill   • ARIPiprazole (ABILIFY) 20 MG tablet Take 20 mg by mouth every morning     • BD PosiFlush 0.9 % SOLN      • calcium acetate (PHOSLO) capsule Take 1 capsule (667 mg total) by mouth 2 (two) times a day with meals 180 capsule 3   • Calcium-Magnesium-Vitamin D (CALCIUM 500 PO) Take 1 capsule by mouth daily at bedtime     • cetirizine (ZyrTEC) 10 mg tablet Take 1 tablet (10 mg total) by mouth daily 90 tablet 1   • cholecalciferol (VITAMIN D3) 1,000 units tablet Take 4 tablets (4,000 Units total) by mouth daily (Patient taking differently: Take 4,000 Units by mouth daily at bedtime) 90 tablet 0   • dronabinol (MARINOL) 2.5 mg capsule Take 1 capsule (2.5 mg total) by mouth 2 (two) times a day before meals 30 capsule 0   • fluticasone (FLONASE) 50 mcg/act nasal spray 1 spray into each nostril daily 15.8 mL 0   • folic acid (FOLVITE) 1 mg tablet Take 1 tablet (1 mg total) by mouth daily (Patient taking differently: Take 1 mg by mouth daily at bedtime)  0   • Multiple Vitamin (MULTIVITAMIN) tablet Take 1 tablet by mouth every morning     • oxybutynin (DITROPAN XL) 15 MG 24 hr tablet Take 1 tablet (15 mg total) by mouth daily at bedtime 90 tablet 1   • senna (SENOKOT) 8.6 MG tablet Take 1 tablet (8.6 mg total) by mouth 2 (two) times a day as needed for constipation 60 tablet 0   • sodium bicarbonate 650 mg tablet Take 1 tablet (650 mg total) by mouth 3 (three) times a day 270 tablet 3   • sodium chloride, PF, 0.9 % 10 mL by Intracatheter route daily Intracatheter flushing daily. May substitute prefilled syringe with normal saline 10 mL vials, 10 mL syringes, and 18 g blunt needles 300 mL 0   • venlafaxine (EFFEXOR-XR) 75 mg 24 hr capsule TAKE 3 CAPS  "DAILY     • Vibegron (Gemtesa) 75 MG TABS Take 1 capsule by mouth every morning 90 tablet 3   • calcitriol (ROCALTROL) 0.25 mcg capsule Take 1 capsule (0.25 mcg total) by mouth 3 (three) times a week 12 capsule 3   • Cyanocobalamin (VITAMIN B12 PO) Take 1 capsule by mouth daily after lunch (Patient not taking: Reported on 5/22/2024)     • furosemide (LASIX) 20 mg tablet TAKE 1 TABLET (20 MG TOTAL) BY MOUTH IF NEEDED (FOR INCREASED LEG SWELLING OR WEIGHT GAIN >2 LBS IN 1 DAY) (Patient not taking: Reported on 5/22/2024) 90 tablet 1   • omeprazole (PriLOSEC) 20 mg delayed release capsule Take 20 mg by mouth every morning (Patient not taking: Reported on 5/22/2024)     • saccharomyces boulardii (FLORASTOR) 250 mg capsule Take 1 capsule (250 mg total) by mouth daily after lunch (Patient not taking: Reported on 7/29/2024)     • sodium chloride, PF, 0.9 % 10 mL by Intracatheter route daily for 120 doses Intracatheter flushing daily. May substitute prefilled syringe with normal saline 10 mL vials, 10 mL syringes, and 18 g blunt needles 300 mL 3   • sodium chloride, PF, 0.9 % 10 mL by Intracatheter route daily for 120 doses Intracatheter flushing daily. May substitute prefilled syringe with normal saline 10 mL vials, 10 mL syringes, and 18 g blunt needles 300 mL 3   • sodium chloride, PF, 0.9 % 10 mL by Intracatheter route daily for 90 doses 300 mL 2     No current facility-administered medications for this visit.       Allergies  Allergies   Allergen Reactions   • Cephalosporins Rash     Which Cephalosporin reaction was to not specified; however, has tolerated Amoxicillin, Cefazolin, and Cefepime       Past Medical History, Social History, Family History, medications and allergies were reviewed.    Vitals  Vitals:    07/29/24 1143   BP: 128/74   BP Location: Left arm   Patient Position: Sitting   Cuff Size: Standard   Pulse: 62   SpO2: 98%   Weight: 69.9 kg (154 lb)   Height: 4' 9\" (1.448 m)       Physical Exam  On " "examination she is in no acute distress.  Abdomen soft nontender nondistended.  A midline supraumbilical scar is well-healed.  Bilateral nephrostomy tubes are in place.  Skin is warm.  Extremities without edema.  Neurologic is grossly intact and nonfocal.  Gait normal.  Affect normal.  Results  No results found for: \"PSA\"  Lab Results   Component Value Date    GLUCOSE 219 (H) 12/19/2017    CALCIUM 8.8 04/23/2024     10/27/2017    K 4.4 04/23/2024    CO2 25 04/23/2024    CL 99 04/23/2024    BUN 48 (H) 04/23/2024    CREATININE 2.10 (H) 04/23/2024     Lab Results   Component Value Date    WBC 6.49 04/23/2024    HGB 11.0 (L) 04/23/2024    HCT 36.1 04/23/2024    MCV 96 04/23/2024     04/23/2024       Office Urine Dip  No results found for this or any previous visit (from the past 1 hour(s)).]    "

## 2024-07-29 NOTE — PROGRESS NOTES
7/29/2024    Ragini Melendez  1955  [id number]    1. Ureteral stricture  Assessment & Plan:  Impression: History of endometrial cancer, status post hysterectomy with chemotherapy and radiation, bilateral distal ureteral strictures, bilateral indwelling nephrostomy tubes    Plan: I had a lengthy discussion again with Ragini and her sister via speaker phone in the office today regarding her bilateral nephrostomies and distal ureteral strictures.  We discussed the challenges that can be associated with ileal conduit urinary diversion in a patient who has previously had pelvic surgery along with pelvic radiation.  We discussed the risk of an ileal conduit with progressive renal failure and ultimately the need for dialysis and a possible renal transplant.  We discussed that the surgery itself may be technically impossible to perform based on intraoperative findings at the time of surgery and the bilateral nephrostomy tubes may ultimately be a permanent solution for her.  I will discuss this case further with Dr. Rodriguez and pending her input we will plan to schedule her for an open ileal conduit creation/urinary diversion/ureterolysis.  The patient is amenable with this plan and will return to the office 2 weeks prior to surgery for additional discussion, H&P, and consent.  Orders:  -     Case request operating room: ILEAL CONDUIT urinary diversion (No cystectomy); Standing  -     Case request operating room: ILEAL CONDUIT urinary diversion (No cystectomy)       History of Present Illness  68 y.o. female with a history of T1b endometrial carcinoma status post chemoradiation with surgery.  She has bilateral distal ureteral strictures which have been managed previously with stents but now exclusively with nephrostomy tubes as she cannot tolerate stents alone secondary to infection/renal compromise.  Her most recent creatinine level is noted to be 2.1 from April 2024.  Prior retrograde pyelogram show marked narrowing of  the distal ureters from the pelvic brim down.  We have talked on many occasion about a ureteral reimplant versus ileal conduit urinary diversion.  She is most interested in an ileal conduit.  We discussed leaving her native bladder in situ in case she were ever to require or need a renal transplant in the future.  Fortunately her most recent CT scan of the chest abdomen pelvis shows no evidence of recurrent disease.  She returns to the office today for additional discussion regarding possible urinary diversion.      AUA Symptom Score      Review of Systems    Past Medical History  Past Medical History:   Diagnosis Date   • Anemia    • Anxiety    • Cancer (HCC)     ENDOMETRIAL   • Chemotherapy induced neutropenia (HCC) 8/30/2019   • CKD (chronic kidney disease) stage 3, GFR 30-59 ml/min (HCC)    • COVID     Fall 2022   • Depression    • DVT (deep venous thrombosis) (HCC) 07/19/2019    RIGHT LEG   • Endometrial cancer (HCC) 12/2017   • GERD (gastroesophageal reflux disease)    • H/O gastric bypass    • Hard to intubate     pt denies AS OF 7/25/23   • History of chemotherapy     started 12/2021endometrial cancer- done 12/23/22   • History of DVT in adulthood     RLE   • History of transfusion 04/13/2023   • Hyperglycemia     vx type 2 dm -- last assessed 4/1/14; resolved 11/7/17   • Hyperlipidemia    • Hypertension     in past- no meds at present   • Iron deficiency anemia     iron infusions weekly   • OAB (overactive bladder)    • Port-A-Cath in place    • Wears glasses        Past Social History  Past Surgical History:   Procedure Laterality Date   • ABDOMINAL SURGERY      GASTRIC BYPASS; 2001   • BREAST BIOPSY Right 06/28/2019    core biopsy; benign   • CHOLECYSTECTOMY      at the time of gastric bypass   • COLONOSCOPY     • CT NEEDLE BIOPSY LYMPH NODE  07/08/2019   • FL GUIDED CENTRAL VENOUS ACCESS DEVICE INSERTION  11/12/2019   • FL RETROGRADE PYELOGRAM  03/30/2023   • FL RETROGRADE PYELOGRAM  05/09/2023   • FL  RETROGRADE PYELOGRAM  8/1/2023   • GASTRIC BYPASS     • HYSTERECTOMY Bilateral 2017    total abdominal with salpingo-oophorectomy   • IR NEPHROSTOMY TO NEPHROURETERAL STENT  06/09/2022   • IR NEPHROSTOMY TO NEPHROURETERAL STENT  12/20/2022   • IR NEPHROSTOMY TO NEPHROURETERAL STENT  8/8/2023   • IR NEPHROSTOMY TUBE CHECK/CHANGE/REPOSITION/REINSERTION/UPSIZE  05/27/2022   • IR NEPHROSTOMY TUBE CHECK/CHANGE/REPOSITION/REINSERTION/UPSIZE  11/10/2023   • IR NEPHROSTOMY TUBE CHECK/CHANGE/REPOSITION/REINSERTION/UPSIZE  2/9/2024   • IR NEPHROSTOMY TUBE CHECK/CHANGE/REPOSITION/REINSERTION/UPSIZE  4/12/2024   • IR NEPHROSTOMY TUBE CHECK/CHANGE/REPOSITION/REINSERTION/UPSIZE  7/12/2024   • IR NEPHROSTOMY TUBE PLACEMENT  07/26/2019   • IR NEPHROSTOMY TUBE PLACEMENT  05/27/2023   • IR NEPHROURETERAL STENT CHECK/CHANGE/REPOSITION  04/06/2021   • IR NEPHROURETERAL STENT CHECK/CHANGE/REPOSITION  06/04/2021   • IR NEPHROURETERAL STENT CHECK/CHANGE/REPOSITION  09/03/2021   • IR NEPHROURETERAL STENT CHECK/CHANGE/REPOSITION  12/07/2021   • IR NEPHROURETERAL STENT CHECK/CHANGE/REPOSITION  03/08/2022   • IR NEPHROURETERAL STENT CHECK/CHANGE/REPOSITION  05/10/2022   • IR NEPHROURETERAL STENT CHECK/CHANGE/REPOSITION  09/19/2022   • IR PICC LINE  09/27/2019   • IR PORT PLACEMENT  07/26/2019   • IR PORT PLACEMENT     • IR PORT REMOVAL  09/20/2019   • OOPHORECTOMY Bilateral 2017   • TX CYSTO BLADDER W/URETERAL CATHETERIZATION Right 03/30/2023    Procedure: CYSTOSCOPY RETROGRADE PYELOGRAM WITH exchange of STENT URETERAL;  Surgeon: Demetrius Lowe MD;  [facility];  Service: Urology   • TX CYSTO BLADDER W/URETERAL CATHETERIZATION Bilateral 05/09/2023    Procedure: CYSTOSCOPY, BILATERAL RETROGRADE PYELOGRAM, WITH LEFT INSERTION STENT URETERAL, RIGHT URTERAL STENT EXCHANGE;  Surgeon: Nicola Hannah MD;  [facility];  Service: Urology   • TX CYSTO BLADDER W/URETERAL CATHETERIZATION Bilateral 8/1/2023    Procedure:  CYSTOSCOPY BILATERAL RETROGRADE  WITH REMOVAL BILATERAL  STENT URETERAL;  Surgeon: Demetrius Lowe MD;  [facility];  Service: Urology   • IL CYSTO W/INSERT URETERAL STENT Right 03/30/2023    Procedure: EXCHANGE STENT URETERAL;  Surgeon: Demetrius Lowe MD;  [facility];  Service: Urology   • IL INSJ TUNNELED CTR VAD W/SUBQ PORT AGE 5 YR/> Left 11/12/2019    Procedure: INSERTION VENOUS PORT ( PORT-A-CATH) IR;  Surgeon: Edilberto Guzman DO;  [facility];  Service: Interventional Radiology   • IL LAPS ABD PRTM&OMENTUM DX W/WO SPEC BR/WA SPX N/A 12/19/2017    Procedure: LAPAROSCOPY DIAGNOSTIC;  Surgeon: Evgeny So MD;  [facility];  Service: Gynecology Oncology   • IL LAPS W/RAD HYST W/BILAT LMPHADEC RMVL TUBE/OVARY N/A 12/19/2017    Procedure: HYSTERECTOMY LAPAROSCOPIC TOTAL (LTH) W/ ROBOTICS; BILATERAL SALPINGOOPHERECTOMY; LYMPH NODE DISSECTION; lysis of adhesions;  Surgeon: Evgeny So MD;  [facility];  Service: Gynecology Oncology   • REMOVAL URETERAL STENT Bilateral 8/1/2023    Procedure: REMOVAL STENT URETERAL;  Surgeon: Demetrius Lowe MD;  [facility];  Service: Urology   • TONSILLECTOMY     • US GUIDED BREAST BIOPSY RIGHT COMPLETE Right 06/28/2019       Past Family History  Family History   Problem Relation Age of Onset   • Hyperlipidemia Mother    • Heart disease Mother    • Ovarian cancer Mother 50   • Colon cancer Mother    • Lymphoma Father    • Breast cancer Sister 63   • No Known Problems Brother    • No Known Problems Brother    • No Known Problems Maternal Grandmother    • Bone cancer Maternal Grandfather    • Uterine cancer Paternal Grandmother    • No Known Problems Paternal Grandfather    • No Known Problems Maternal Aunt    • No Known Problems Maternal Aunt    • No Known Problems Maternal Aunt    • No Known Problems Maternal Aunt    • No Known Problems Paternal Aunt    • No Known Problems Paternal Aunt    • No  Known Problems Paternal Aunt    • No Known Problems Paternal Aunt        Past Social history  Social History     Socioeconomic History   • Marital status: Single     Spouse name: Not on file   • Number of children: Not on file   • Years of education: Not on file   • Highest education level: Not on file   Occupational History   • Not on file   Tobacco Use   • Smoking status: Never   • Smokeless tobacco: Never   Vaping Use   • Vaping status: Never Used   Substance and Sexual Activity   • Alcohol use: Never   • Drug use: Never   • Sexual activity: Not Currently   Other Topics Concern   • Not on file   Social History Narrative   • Not on file     Social Determinants of Health     Financial Resource Strain: Low Risk  (9/26/2023)    Overall Financial Resource Strain (CARDIA)    • Difficulty of Paying Living Expenses: Not very hard   Food Insecurity: No Food Insecurity (5/30/2023)    Hunger Vital Sign    • Worried About Running Out of Food in the Last Year: Never true    • Ran Out of Food in the Last Year: Never true   Transportation Needs: No Transportation Needs (9/26/2023)    PRAPARE - Transportation    • Lack of Transportation (Medical): No    • Lack of Transportation (Non-Medical): No   Physical Activity: Not on file   Stress: Not on file   Social Connections: Not on file   Intimate Partner Violence: Not on file   Housing Stability: Low Risk  (5/30/2023)    Housing Stability Vital Sign    • Unable to Pay for Housing in the Last Year: No    • Number of Places Lived in the Last Year: 1    • Unstable Housing in the Last Year: No       Current Medications  Current Outpatient Medications   Medication Sig Dispense Refill   • ARIPiprazole (ABILIFY) 20 MG tablet Take 20 mg by mouth every morning     • BD PosiFlush 0.9 % SOLN      • calcium acetate (PHOSLO) capsule Take 1 capsule (667 mg total) by mouth 2 (two) times a day with meals 180 capsule 3   • Calcium-Magnesium-Vitamin D (CALCIUM 500 PO) Take 1 capsule by mouth daily  at bedtime     • cetirizine (ZyrTEC) 10 mg tablet Take 1 tablet (10 mg total) by mouth daily 90 tablet 1   • cholecalciferol (VITAMIN D3) 1,000 units tablet Take 4 tablets (4,000 Units total) by mouth daily (Patient taking differently: Take 4,000 Units by mouth daily at bedtime) 90 tablet 0   • dronabinol (MARINOL) 2.5 mg capsule Take 1 capsule (2.5 mg total) by mouth 2 (two) times a day before meals 30 capsule 0   • fluticasone (FLONASE) 50 mcg/act nasal spray 1 spray into each nostril daily 15.8 mL 0   • folic acid (FOLVITE) 1 mg tablet Take 1 tablet (1 mg total) by mouth daily (Patient taking differently: Take 1 mg by mouth daily at bedtime)  0   • Multiple Vitamin (MULTIVITAMIN) tablet Take 1 tablet by mouth every morning     • oxybutynin (DITROPAN XL) 15 MG 24 hr tablet Take 1 tablet (15 mg total) by mouth daily at bedtime 90 tablet 1   • senna (SENOKOT) 8.6 MG tablet Take 1 tablet (8.6 mg total) by mouth 2 (two) times a day as needed for constipation 60 tablet 0   • sodium bicarbonate 650 mg tablet Take 1 tablet (650 mg total) by mouth 3 (three) times a day 270 tablet 3   • sodium chloride, PF, 0.9 % 10 mL by Intracatheter route daily Intracatheter flushing daily. May substitute prefilled syringe with normal saline 10 mL vials, 10 mL syringes, and 18 g blunt needles 300 mL 0   • venlafaxine (EFFEXOR-XR) 75 mg 24 hr capsule TAKE 3 CAPS DAILY     • Vibegron (Gemtesa) 75 MG TABS Take 1 capsule by mouth every morning 90 tablet 3   • calcitriol (ROCALTROL) 0.25 mcg capsule Take 1 capsule (0.25 mcg total) by mouth 3 (three) times a week 12 capsule 3   • Cyanocobalamin (VITAMIN B12 PO) Take 1 capsule by mouth daily after lunch (Patient not taking: Reported on 5/22/2024)     • furosemide (LASIX) 20 mg tablet TAKE 1 TABLET (20 MG TOTAL) BY MOUTH IF NEEDED (FOR INCREASED LEG SWELLING OR WEIGHT GAIN >2 LBS IN 1 DAY) (Patient not taking: Reported on 5/22/2024) 90 tablet 1   • omeprazole (PriLOSEC) 20 mg delayed release  "capsule Take 20 mg by mouth every morning (Patient not taking: Reported on 5/22/2024)     • saccharomyces boulardii (FLORASTOR) 250 mg capsule Take 1 capsule (250 mg total) by mouth daily after lunch (Patient not taking: Reported on 7/29/2024)     • sodium chloride, PF, 0.9 % 10 mL by Intracatheter route daily for 120 doses Intracatheter flushing daily. May substitute prefilled syringe with normal saline 10 mL vials, 10 mL syringes, and 18 g blunt needles 300 mL 3   • sodium chloride, PF, 0.9 % 10 mL by Intracatheter route daily for 120 doses Intracatheter flushing daily. May substitute prefilled syringe with normal saline 10 mL vials, 10 mL syringes, and 18 g blunt needles 300 mL 3   • sodium chloride, PF, 0.9 % 10 mL by Intracatheter route daily for 90 doses 300 mL 2     No current facility-administered medications for this visit.       Allergies  Allergies   Allergen Reactions   • Cephalosporins Rash     Which Cephalosporin reaction was to not specified; however, has tolerated Amoxicillin, Cefazolin, and Cefepime       Past Medical History, Social History, Family History, medications and allergies were reviewed.    Vitals  Vitals:    07/29/24 1143   BP: 128/74   BP Location: Left arm   Patient Position: Sitting   Cuff Size: Standard   Pulse: 62   SpO2: 98%   Weight: 69.9 kg (154 lb)   Height: 4' 9\" (1.448 m)       Physical Exam  On examination she is in no acute distress.  Abdomen soft nontender nondistended.  A midline supraumbilical scar is well-healed.  Bilateral nephrostomy tubes are in place.  Skin is warm.  Extremities without edema.  Neurologic is grossly intact and nonfocal.  Gait normal.  Affect normal.  Results  No results found for: \"PSA\"  Lab Results   Component Value Date    GLUCOSE 219 (H) 12/19/2017    CALCIUM 8.8 04/23/2024     10/27/2017    K 4.4 04/23/2024    CO2 25 04/23/2024    CL 99 04/23/2024    BUN 48 (H) 04/23/2024    CREATININE 2.10 (H) 04/23/2024     Lab Results   Component Value " Date    WBC 6.49 04/23/2024    HGB 11.0 (L) 04/23/2024    HCT 36.1 04/23/2024    MCV 96 04/23/2024     04/23/2024       Office Urine Dip  No results found for this or any previous visit (from the past 1 hour(s)).]

## 2024-07-29 NOTE — ASSESSMENT & PLAN NOTE
Impression: History of endometrial cancer, status post hysterectomy with chemotherapy and radiation, bilateral distal ureteral strictures, bilateral indwelling nephrostomy tubes    Plan: I had a lengthy discussion again with Ragini and her sister via speaker phone in the office today regarding her bilateral nephrostomies and distal ureteral strictures.  We discussed the challenges that can be associated with ileal conduit urinary diversion in a patient who has previously had pelvic surgery along with pelvic radiation.  We discussed the risk of an ileal conduit with progressive renal failure and ultimately the need for dialysis and a possible renal transplant.  We discussed that the surgery itself may be technically impossible to perform based on intraoperative findings at the time of surgery and the bilateral nephrostomy tubes may ultimately be a permanent solution for her.  I will discuss this case further with Dr. Rodriguez and pending her input we will plan to schedule her for an open ileal conduit creation/urinary diversion/ureterolysis.  The patient is amenable with this plan and will return to the office 2 weeks prior to surgery for additional discussion, H&P, and consent.

## 2024-08-01 ENCOUNTER — HOSPITAL ENCOUNTER (OUTPATIENT)
Dept: INFUSION CENTER | Facility: CLINIC | Age: 69
Discharge: HOME | End: 2024-08-01
Payer: COMMERCIAL

## 2024-08-01 DIAGNOSIS — E83.39 HYPERPHOSPHATEMIA: ICD-10-CM

## 2024-08-01 DIAGNOSIS — Z45.2 ENCOUNTER FOR CENTRAL LINE CARE: Primary | ICD-10-CM

## 2024-08-01 DIAGNOSIS — C54.1 ENDOMETRIAL CANCER (HCC): ICD-10-CM

## 2024-08-01 DIAGNOSIS — N18.4 STAGE 4 CHRONIC KIDNEY DISEASE (HCC): ICD-10-CM

## 2024-08-01 DIAGNOSIS — Z86.39 HISTORY OF METABOLIC ACIDOSIS: ICD-10-CM

## 2024-08-01 LAB
ALBUMIN SERPL BCG-MCNC: 4.1 G/DL (ref 3.5–5)
ANION GAP SERPL CALCULATED.3IONS-SCNC: 9 MMOL/L (ref 4–13)
BUN SERPL-MCNC: 44 MG/DL (ref 5–25)
CALCIUM SERPL-MCNC: 8.8 MG/DL (ref 8.4–10.2)
CHLORIDE SERPL-SCNC: 103 MMOL/L (ref 96–108)
CO2 SERPL-SCNC: 23 MMOL/L (ref 21–32)
CREAT SERPL-MCNC: 1.92 MG/DL (ref 0.6–1.3)
GFR SERPL CREATININE-BSD FRML MDRD: 26 ML/MIN/1.73SQ M
GLUCOSE P FAST SERPL-MCNC: 92 MG/DL (ref 65–99)
GLUCOSE SERPL-MCNC: 92 MG/DL (ref 65–140)
PHOSPHATE SERPL-MCNC: 4.6 MG/DL (ref 2.3–4.1)
POTASSIUM SERPL-SCNC: 4.3 MMOL/L (ref 3.5–5.3)
SODIUM SERPL-SCNC: 135 MMOL/L (ref 135–147)

## 2024-08-01 PROCEDURE — 80069 RENAL FUNCTION PANEL: CPT

## 2024-08-01 NOTE — PROGRESS NOTES
Pt. offers no complaints.  Port accessed, labs drawn.  AVS declined, next appt. confirmed for 9/12 @ 9:00.

## 2024-08-06 ENCOUNTER — OFFICE VISIT (OUTPATIENT)
Dept: SLEEP CENTER | Facility: CLINIC | Age: 69
End: 2024-08-06
Payer: COMMERCIAL

## 2024-08-06 VITALS
WEIGHT: 151.4 LBS | HEART RATE: 72 BPM | HEIGHT: 57 IN | SYSTOLIC BLOOD PRESSURE: 122 MMHG | DIASTOLIC BLOOD PRESSURE: 78 MMHG | BODY MASS INDEX: 32.66 KG/M2

## 2024-08-06 DIAGNOSIS — R06.83 SNORING: ICD-10-CM

## 2024-08-06 PROCEDURE — 99204 OFFICE O/P NEW MOD 45 MIN: CPT | Performed by: NURSE PRACTITIONER

## 2024-08-06 NOTE — PATIENT INSTRUCTIONS
Change timing of abilify to bedtime to see if daytime sleepiness improves  If increase in night time awakenings, daytime sleepiness, call for a follow up and we can schedule the sleep study.  Follow up on an as needed basis.      Nursing Support:  When:  Monday through Friday 7:00am-5:00pm, except holidays  Where:  Direct phone line is [phone], option 3  If you are having a true emergency, please call 911.  In the event that the line is busy or it is after hours, please leave a voice mail message and we will return your call.  Please speak clearly, leaving your full name, birth date, best number to reach you and the reason for your call.  Medication refills:  We will need the name of the medication, the dosage, the ordering provider, whether you get a 30 day or 90 day refill and the pharmacy name and address.  Medications will be ordered by the providers only.  Nurses cannot call in prescriptions.    To reach the [facility] office staff:  Call [phone], option 1, followed by option 3

## 2024-08-06 NOTE — PROGRESS NOTES
Consultation - [facility]  [facility]  Ragini Melendez, 1955, MRN: [id number]    8/6/2024        Reason for Consult / Principal Problem:  Snoring  Evaluation of possible Obstructive Sleep Apnea    CONSULTING PROVIDER:  Emely Yost DO  [address] [place] [address]    ASSESSMENT/PLAN:    1. Snoring  -     Ambulatory Referral to Sleep Medicine       Thank you for the opportunity of participating in the evaluation and care of this patient in the Sleep Clinic at [facility].  If there are any questions regarding this evaluation, please feel free to reach out.   ________________________________________________________________________________________________    HPI: Ragini Melendez is a 68 y.o. female with PMHx of HTN, obesity, depression, iron deficiency anemia, presents for evaluation of snoring and symptoms of dry mouth at night.  She discussed symptoms with her PCP and began use of flonase and cetirizine for allergy symptoms.  Since treatment with these, oral dryness has improved.  She currently sleeps alone, so is not sure if she is still snoring.  She admits to night time awakenings, but sets an alarm 2 times per night to empty and care for her nephrostomy tubes.  She does not feel refreshed when awakening.  She takes her medications and starts her day.  Some days, she feels sleepy in the later morning hours and takes a 15-30 minute nap.        Prior Sleep Studies:  She completed a diagnostic sleep study in 2013.  AHI was 1.4.  Sleep efficiency was 87.8%.  There was <1% REM sleep.  Oxygen dony was  94%.  Weight at the time of the study was 235 lbs.  Mild PLMs were noted.        Past Treatments:  She took medication for PLMs in the past, but didn't feel it helped, so she stopped the medication. She isn't aware of limb movements at this time.      [place] Sleepiness Scale: Total score: 6/24  Greater or equal to 10 is  positive for excessive daytime sleepiness    Pertinent Meds: abilify - 20mg in the am, venlafaxine XR 225mg in the am, oxybutinin 15mg daily at bedtime      Sleep-Wake Schedule:  She is self-described as having no morning/night preference.  Bedtime: 11:00pm, reads for an hour and falls asleep before midnight  Wake Time: 8:00am without use of an alarm, some days feels rested, some days does not  Difficulty Falling Asleep: Yes, sometimes but not regularly  Avg Number of Awakenings: 2-3 times per night  Cause of Awakenings: sets an alarm to empty nephrostomy bags  Naps: 15-30 minutes in the morning 2-3 times per week   If She does take a nap, naps are refreshing in quality    Avg TST per 24 hours: 5-6 hours    Sleep-Related Details:  Preferred Sleep Position: supine  SDB Symptoms: snoring, dry mouth/nose, and waking unrefreshed  Bruxism: No  Nocturnal GERD: No  Nocturnal Palpitations: No  Nocturnal Anxiety or Rumination: No  Sleep-Related Hallucinations: No   Sleep Paralysis: No   Cataplexy: No     Past history of PLMD.  No current restless leg symptoms.    She denies any parasomnia activity.    Wake-Related Details  Daytime Sleepiness: Yes, mornings    Drowsy Driving: No  Employment:  currently retired.  She currently cares for her 90 year old mother, who has moderate dementia and lives with her.      CDL license:  No    Caffeine:  drinks iced tea, latest at 4-5:00pm    Tobacco:   reports that she has never smoked. She has never used smokeless tobacco.  E-cig/Vaping:    E-Cigarette/Vaping    E-Cigarette Use Never User       E-Cigarette/Vaping Substances    Nicotine No     THC No     CBD No     Flavoring No     Other No     Unknown No       Alcohol:   reports no history of alcohol use.    Drugs:   reports no history of drug use.      Weight Change:   unintentional weight loss of 80 lbs over the past 9 years - since time of the sleep study in 2013    She does not have difficulty with memory or concentration.      Other  Relevant Labs and Studies:  Labs: I have personally reviewed pertinent lab results.  Lab Results   Component Value Date    WBC 6.49 04/23/2024    HGB 11.0 (L) 04/23/2024    HCT 36.1 04/23/2024    MCV 96 04/23/2024     04/23/2024      Lab Results   Component Value Date    GLUCOSE 219 (H) 12/19/2017    CALCIUM 8.8 08/01/2024     10/27/2017    K 4.3 08/01/2024    CO2 23 08/01/2024     08/01/2024    BUN 44 (H) 08/01/2024    CREATININE 1.92 (H) 08/01/2024     Lab Results   Component Value Date    IRON 108 04/23/2024    TIBC 261 04/23/2024    FERRITIN 727 (H) 04/23/2024     Lab Results   Component Value Date    CRTAYUDF13 1,115 (H) 04/23/2024     Lab Results   Component Value Date    FOLATE >22.3 06/27/2023         Past Medical History:   Diagnosis Date    Anemia     Anxiety     Cancer (HCC)     ENDOMETRIAL    Chemotherapy induced neutropenia (HCC) 8/30/2019    CKD (chronic kidney disease) stage 3, GFR 30-59 ml/min (HCC)     COVID     Fall 2022    Depression     DVT (deep venous thrombosis) (HCC) 07/19/2019    RIGHT LEG    Endometrial cancer (HCC) 12/2017    GERD (gastroesophageal reflux disease)     H/O gastric bypass     Hard to intubate     pt denies AS OF 7/25/23    History of chemotherapy     started 12/2021endometrial cancer- done 12/23/22    History of DVT in adulthood     RLE    History of transfusion 04/13/2023    Hyperglycemia     vx type 2 dm -- last assessed 4/1/14; resolved 11/7/17    Hyperlipidemia     Hypertension     in past- no meds at present    Iron deficiency anemia     iron infusions weekly    OAB (overactive bladder)     Port-A-Cath in place     Wears glasses      Past Surgical History:   Procedure Laterality Date    ABDOMINAL SURGERY      GASTRIC BYPASS; 2001    BREAST BIOPSY Right 06/28/2019    core biopsy; benign    CHOLECYSTECTOMY      at the time of gastric bypass    COLONOSCOPY      CT NEEDLE BIOPSY LYMPH NODE  07/08/2019    FL GUIDED CENTRAL VENOUS ACCESS DEVICE INSERTION   11/12/2019    FL RETROGRADE PYELOGRAM  03/30/2023    FL RETROGRADE PYELOGRAM  05/09/2023    FL RETROGRADE PYELOGRAM  8/1/2023    GASTRIC BYPASS      HYSTERECTOMY Bilateral 2017    total abdominal with salpingo-oophorectomy    IR NEPHROSTOMY TO NEPHROURETERAL STENT  06/09/2022    IR NEPHROSTOMY TO NEPHROURETERAL STENT  12/20/2022    IR NEPHROSTOMY TO NEPHROURETERAL STENT  8/8/2023    IR NEPHROSTOMY TUBE CHECK/CHANGE/REPOSITION/REINSERTION/UPSIZE  05/27/2022    IR NEPHROSTOMY TUBE CHECK/CHANGE/REPOSITION/REINSERTION/UPSIZE  11/10/2023    IR NEPHROSTOMY TUBE CHECK/CHANGE/REPOSITION/REINSERTION/UPSIZE  2/9/2024    IR NEPHROSTOMY TUBE CHECK/CHANGE/REPOSITION/REINSERTION/UPSIZE  4/12/2024    IR NEPHROSTOMY TUBE CHECK/CHANGE/REPOSITION/REINSERTION/UPSIZE  7/12/2024    IR NEPHROSTOMY TUBE PLACEMENT  07/26/2019    IR NEPHROSTOMY TUBE PLACEMENT  05/27/2023    IR NEPHROURETERAL STENT CHECK/CHANGE/REPOSITION  04/06/2021    IR NEPHROURETERAL STENT CHECK/CHANGE/REPOSITION  06/04/2021    IR NEPHROURETERAL STENT CHECK/CHANGE/REPOSITION  09/03/2021    IR NEPHROURETERAL STENT CHECK/CHANGE/REPOSITION  12/07/2021    IR NEPHROURETERAL STENT CHECK/CHANGE/REPOSITION  03/08/2022    IR NEPHROURETERAL STENT CHECK/CHANGE/REPOSITION  05/10/2022    IR NEPHROURETERAL STENT CHECK/CHANGE/REPOSITION  09/19/2022    IR PICC LINE  09/27/2019    IR PORT PLACEMENT  07/26/2019    IR PORT PLACEMENT      IR PORT REMOVAL  09/20/2019    OOPHORECTOMY Bilateral 2017    SD CYSTO BLADDER W/URETERAL CATHETERIZATION Right 03/30/2023    Procedure: CYSTOSCOPY RETROGRADE PYELOGRAM WITH exchange of STENT URETERAL;  Surgeon: Demetrius Lowe MD;  Location: [facility];  Service: Urology    SD CYSTO BLADDER W/URETERAL CATHETERIZATION Bilateral 05/09/2023    Procedure: CYSTOSCOPY, BILATERAL RETROGRADE PYELOGRAM, WITH LEFT INSERTION STENT URETERAL, RIGHT URTERAL STENT EXCHANGE;  Surgeon: Nicola Hannah MD;  [facility];  Service: Urology    SD CYSTO  BLADDER W/URETERAL CATHETERIZATION Bilateral 8/1/2023    Procedure: CYSTOSCOPY BILATERAL RETROGRADE  WITH REMOVAL BILATERAL  STENT URETERAL;  Surgeon: Demetrius Lowe MD;  [facility];  Service: Urology    OK CYSTO W/INSERT URETERAL STENT Right 03/30/2023    Procedure: EXCHANGE STENT URETERAL;  Surgeon: Demetrius Lowe MD;  [facility];  Service: Urology    OK INSJ TUNNELED CTR VAD W/SUBQ PORT AGE 5 YR/> Left 11/12/2019    Procedure: INSERTION VENOUS PORT ( PORT-A-CATH) IR;  Surgeon: Edilberto Guzman DO;  [facility];  Service: Interventional Radiology    OK LAPS ABD PRTM&OMENTUM DX W/WO SPEC BR/WA SPX N/A 12/19/2017    Procedure: LAPAROSCOPY DIAGNOSTIC;  Surgeon: Evgeny So MD;  [facility];  Service: Gynecology Oncology    OK LAPS W/RAD HYST W/BILAT LMPHADEC RMVL TUBE/OVARY N/A 12/19/2017    Procedure: HYSTERECTOMY LAPAROSCOPIC TOTAL (LTH) W/ ROBOTICS; BILATERAL SALPINGOOPHERECTOMY; LYMPH NODE DISSECTION; lysis of adhesions;  Surgeon: Evgeny So MD;  [facility];  Service: Gynecology Oncology    REMOVAL URETERAL STENT Bilateral 8/1/2023    Procedure: REMOVAL STENT URETERAL;  Surgeon: Demetrius Lowe MD;  [facility];  Service: Urology    TONSILLECTOMY      US GUIDED BREAST BIOPSY RIGHT COMPLETE Right 06/28/2019     Patient Active Problem List   Diagnosis    Benign essential hypertension    Major depression, chronic    Dyslipidemia    Endometrial cancer (HCC)    Encounter for follow-up surveillance of endometrial cancer    Encounter for central line care    Chemotherapy induced neutropenia (HCC)    Anemia    Acute on chronic kidney disease    Bilateral lower extremity edema    Severe protein-calorie malnutrition (HCC)    Hypomagnesemia    Obstruction of right ureter    Overactive bladder    Acute left-sided low back pain without sciatica    Nephrostomy status (HCC)    Bilateral piriformis syndrome    Chronic pain syndrome     Myofascial pain syndrome    Pathological fracture due to osteoporosis    Osteoporosis    Dental disorder    Need for follow-up by     Closed nondisplaced fracture of right pubis with delayed healing    Secondary malignant neoplasm of other specified sites (HCC)    Chronic UTI    Depression, recurrent (HCC)    Vaginal atrophy    Nephrotoxicity    Dry mouth    Abnormal iron saturation    Iron deficiency anemia secondary to inadequate dietary iron intake    Hemorrhagic cystitis w/ pyelonephritis    History of DVT (deep vein thrombosis)    Other proteinuria    Stage 4 chronic kidney disease (HCC)    Encounter for venous access device care    MGUS (monoclonal gammopathy of unknown significance)    Left-sided chest wall pain    Ureteral stricture    Overweight with body mass index (BMI) of 28 to 28.9 in adult    Thrombocytopenia, unspecified (HCC)    Hypertensive CKD (chronic kidney disease)     Allergies as of 08/06/2024 - Reviewed 08/06/2024   Allergen Reaction Noted    Cephalosporins Rash      REVIEW OF SYSTEMS:  Review of Systems  10-point system review completed, all of which are negative except as mentioned above.       CURRENT MEDICATIONS:  Current Outpatient Medications   Medication Instructions    ARIPiprazole (ABILIFY) 20 mg, Oral, Every morning    BD PosiFlush 0.9 % SOLN No dose, route, or frequency recorded.    calcitriol (ROCALTROL) 0.25 mcg, Oral, 3 times weekly    calcium acetate (PHOSLO) 667 mg, Oral, 2 times daily with meals    Calcium-Magnesium-Vitamin D (CALCIUM 500 PO) 1 capsule, Oral, Daily at bedtime    cetirizine (ZYRTEC) 10 mg, Oral, Daily    cholecalciferol (VITAMIN D3) 4,000 Units, Oral, Daily    Cyanocobalamin (VITAMIN B12 PO) 1 capsule, Oral, Daily after lunch    fluticasone (FLONASE) 50 mcg/act nasal spray 1 spray, Nasal, Daily    folic acid (FOLVITE) 1 mg, Oral, Daily    furosemide (LASIX) 20 mg, Oral, As needed    Multiple Vitamin (MULTIVITAMIN) tablet 1 tablet, Oral, Every  morning    omeprazole (PRILOSEC) 20 mg, Oral, Every morning    oxybutynin (DITROPAN XL) 15 mg, Oral, Daily at bedtime    senna (SENOKOT) 8.6 mg, Oral, 2 times daily PRN    sodium bicarbonate 650 mg, Oral, 3 times daily    sodium chloride, PF, 0.9 % 10 mL, Intracatheter, Daily, Intracatheter flushing daily. May substitute prefilled syringe with normal saline 10 mL vials, 10 mL syringes, and 18 g blunt needles    sodium chloride, PF, 0.9 % 10 mL, Intracatheter, Daily, Intracatheter flushing daily. May substitute prefilled syringe with normal saline 10 mL vials, 10 mL syringes, and 18 g blunt needles    sodium chloride, PF, 0.9 % 10 mL, Intracatheter, Daily    sodium chloride, PF, 0.9 % 10 mL, Intracatheter, Daily, Intracatheter flushing daily. May substitute prefilled syringe with normal saline 10 mL vials, 10 mL syringes, and 18 g blunt needles    venlafaxine (EFFEXOR-XR) 75 mg 24 hr capsule TAKE 3 CAPS DAILY    Vibegron (Gemtesa) 75 MG TABS 1 capsule, Oral, Every morning     SOCIAL HISTORY:  Social History     Tobacco Use    Smoking status: Never    Smokeless tobacco: Never   Vaping Use    Vaping status: Never Used   Substance Use Topics    Alcohol use: Never    Drug use: Never     FAMILY HISTORY:  Family History   Problem Relation Age of Onset    Hyperlipidemia Mother     Heart disease Mother     Ovarian cancer Mother 50    Colon cancer Mother     Lymphoma Father     Breast cancer Sister 63    No Known Problems Brother     No Known Problems Brother     No Known Problems Maternal Grandmother     Bone cancer Maternal Grandfather     Uterine cancer Paternal Grandmother     No Known Problems Paternal Grandfather     No Known Problems Maternal Aunt     No Known Problems Maternal Aunt     No Known Problems Maternal Aunt     No Known Problems Maternal Aunt     No Known Problems Paternal Aunt     No Known Problems Paternal Aunt     No Known Problems Paternal Aunt     No Known Problems Paternal Aunt      Family History of  "Sleep Disorders: brother is obese and has sleep apnea    Objective:  Vital Signs:  /78   Pulse 72   Ht 4' 9\" (1.448 m)   Wt 68.7 kg (151 lb 6.4 oz)   LMP  (LMP Unknown)   BMI 32.76 kg/m²   Body mass index is 32.76 kg/m².  Neck Circumference: Neck Circumference: 12.5 inches      PHYSICAL EXAMINATION:    Constitutional: Alert, cooperative, no distress, appears stated age, obese  Skin: Warm, dry, no rashes noted  Eyes: Conjunctiva/corneas clear  ENT: Nasal congestion absent, nares narrow and asymmetric, septum midline, mucosa normal  Posterior Airspace:   Starks Tongue Position: 3  Retrognathia: absent  Overbite: absent  High Arched Palate: absent  Tongue Scalloping/Ridging: absent  Tonsils:  absent  Uvula: barely visualized  Lungs: Clear to auscultation bilaterally, respirations unlabored  Heart: normal rate and regular rhythm, S1/2 normal, no murmur noted, no rub or gallop  Extremities: Normal, no digital clubbing, no pedal edema  Neuro: No focal deficits noted      The review of systems and following portions of the patient's history were reviewed and updated as appropriate: allergies, current medications, past family history, past medical history, past social history, past surgical history and problem list.    ________________________________________________________________________________________________      I have spent a total time of 55 minutes on 08/06/24 in caring for this patient including Risks and benefits of tx options, Instructions for management, Patient and family education, Importance of tx compliance, Risk factor reductions, Impressions, Counseling / Coordination of care, Documenting in the medical record, Reviewing / ordering tests, medicine, procedures  , and Obtaining or reviewing history  .    Today, we reviewed her current symptoms.   She has noted improvement in symptoms with use of flonase and cetirizine.  We discussed changing abilify dosing to the am to see if am drowsiness " "improves, as abilify is known to cause somnolence and fatigue.    She feels symptoms have improved and does not feel she needs any further evaluation at this time.  She will monitor for waking herself from snoring, frequent awakenings and daytime sleepiness.  If noting any of these, she was advised to call with an update and a diagnostic sleep study will be considered.    The following instructions have been given to the patient today:    Patient Instructions   Change timing of abilify to bedtime to see if daytime sleepiness improves  If increase in night time awakenings, daytime sleepiness, call for a follow up and we can schedule the sleep study.  Follow up on an as needed basis.      Nursing Support:  When:  Monday through Friday 7:00am-5:00pm, except holidays  Where:  Direct phone line is [phone], option 3  If you are having a true emergency, please call 911.  In the event that the line is busy or it is after hours, please leave a voice mail message and we will return your call.  Please speak clearly, leaving your full name, birth date, best number to reach you and the reason for your call.  Medication refills:  We will need the name of the medication, the dosage, the ordering provider, whether you get a 30 day or 90 day refill and the pharmacy name and address.  Medications will be ordered by the providers only.  Nurses cannot call in prescriptions.    To reach the [facility] office staff:  Call [phone], option 1, followed by option 3    RAFITA Wilkinson  [facility]    Portions of the record may have been created with voice recognition software. Occasional wrong word or \"sound a like\" substitutions may have occurred due to the inherent limitations of voice recognition software. Please read the chart carefully and recognize, using context, where substitutions have occurred.                                             "

## 2024-08-13 ENCOUNTER — OFFICE VISIT (OUTPATIENT)
Dept: RHEUMATOLOGY | Facility: CLINIC | Age: 69
End: 2024-08-13
Payer: COMMERCIAL

## 2024-08-13 VITALS
DIASTOLIC BLOOD PRESSURE: 60 MMHG | WEIGHT: 151 LBS | SYSTOLIC BLOOD PRESSURE: 112 MMHG | HEIGHT: 57 IN | BODY MASS INDEX: 32.58 KG/M2

## 2024-08-13 DIAGNOSIS — N18.4 STAGE 4 CHRONIC KIDNEY DISEASE (HCC): ICD-10-CM

## 2024-08-13 DIAGNOSIS — M81.0 OSTEOPOROSIS WITHOUT CURRENT PATHOLOGICAL FRACTURE, UNSPECIFIED OSTEOPOROSIS TYPE: Primary | ICD-10-CM

## 2024-08-13 PROCEDURE — 99214 OFFICE O/P EST MOD 30 MIN: CPT | Performed by: STUDENT IN AN ORGANIZED HEALTH CARE EDUCATION/TRAINING PROGRAM

## 2024-08-13 NOTE — Clinical Note
Regarding this patient, you are seeing her tomorrow; I think that currently the risks of prolia outweigh the benefits as if we need to stop the prolia for any reason it would be difficult to follow with a bisphosphonate given her renal function. Going to talk with my partners about their thoughts as well but wanted to let you know my thought process for right now

## 2024-08-13 NOTE — PATIENT INSTRUCTIONS
Please talk with your kidney doctor tomorrow to make sure that you shouldn't be taking more calcium    I will let your kidney doctor know what we talked about    If I have any other thoughts about the plan for you, I will let you know

## 2024-08-13 NOTE — ASSESSMENT & PLAN NOTE
With her renal function, Prolia is probably the only safe medication that we could use. The problem is that if we have to stop it for some reason, doing a bisphosphonate with her renal function would be tricky.    I think the safest thing for now is to get serial DXAs and make sure that things aren't worsening. She is undergoing transplant evaluation so if she is able to get a transplant eventually (appears would have to be at least 3-5 years in remission from her cancer) then that should open up options significantly.

## 2024-08-13 NOTE — PROGRESS NOTES
"Ambulatory Visit  Name: Ragini Melendez      : 1955      MRN: [id number]  Encounter Provider: Jordan Alvarado DO  Encounter Date: 2024   Encounter department: [facility] [place]    Assessment & Plan   1. Osteoporosis without current pathological fracture, unspecified osteoporosis type  Assessment & Plan:  With her renal function, Prolia is probably the only safe medication that we could use. The problem is that if we have to stop it for some reason, doing a bisphosphonate with her renal function would be tricky.    I think the safest thing for now is to get serial DXAs and make sure that things aren't worsening. She is undergoing transplant evaluation so if she is able to get a transplant eventually (appears would have to be at least 3-5 years in remission from her cancer) then that should open up options significantly.  2. Stage 4 chronic kidney disease (HCC)    Patient's rheumatologic disease(s) threaten long-term function if not appropriately treated.    History of Present Illness       No recent falls    Planning for some sort of reconstructive surgery later this year for the kidney issues    Permanent history: First saw me 2024 for osteoporosis complicated by R pubic ramus fracture and proximal humerus fracture; endometrial cancer s/p chemortx complicated by CKD4, history RLE DVT, history Reclast x1 in .    Review of Systems    Objective     /60   Ht 4' 9\" (1.448 m)   Wt 68.5 kg (151 lb)   LMP  (LMP Unknown)   BMI 32.68 kg/m²      General: Well appearing, in no distress.   Eyes: Sclera non-icteric. EOMI  Neuro: Alert and oriented. No gross focal neurological deficits.   Skin: No rashes.  MSK exam: grossly unremarkable      Physical Exam  Administrative Statements           "

## 2024-08-15 ENCOUNTER — OFFICE VISIT (OUTPATIENT)
Dept: NEPHROLOGY | Facility: CLINIC | Age: 69
End: 2024-08-15

## 2024-08-15 VITALS
DIASTOLIC BLOOD PRESSURE: 71 MMHG | HEART RATE: 59 BPM | WEIGHT: 152 LBS | BODY MASS INDEX: 32.79 KG/M2 | SYSTOLIC BLOOD PRESSURE: 124 MMHG | HEIGHT: 57 IN

## 2024-08-15 DIAGNOSIS — Z86.39 HISTORY OF METABOLIC ACIDOSIS: ICD-10-CM

## 2024-08-15 DIAGNOSIS — E83.39 HYPERPHOSPHATEMIA: ICD-10-CM

## 2024-08-15 DIAGNOSIS — R80.9 NON-NEPHROTIC RANGE PROTEINURIA: ICD-10-CM

## 2024-08-15 DIAGNOSIS — N18.4 ANEMIA IN STAGE 4 CHRONIC KIDNEY DISEASE  (HCC): ICD-10-CM

## 2024-08-15 DIAGNOSIS — D63.1 ANEMIA IN STAGE 4 CHRONIC KIDNEY DISEASE  (HCC): ICD-10-CM

## 2024-08-15 DIAGNOSIS — N18.4 STAGE 4 CHRONIC KIDNEY DISEASE (HCC): Primary | ICD-10-CM

## 2024-08-15 DIAGNOSIS — N25.81 SECONDARY HYPERPARATHYROIDISM (HCC): ICD-10-CM

## 2024-08-15 RX ORDER — CALCITRIOL 0.25 UG/1
0.25 CAPSULE, LIQUID FILLED ORAL 3 TIMES WEEKLY
Qty: 12 CAPSULE | Refills: 3 | Status: SHIPPED | OUTPATIENT
Start: 2024-08-16 | End: 2024-11-14

## 2024-08-15 RX ORDER — CALCIUM ACETATE 667 MG/1
667 CAPSULE ORAL
Qty: 180 CAPSULE | Refills: 3 | Status: SHIPPED | OUTPATIENT
Start: 2024-08-15 | End: 2025-08-10

## 2024-08-15 NOTE — PROGRESS NOTES
NEPHROLOGY OFFICE VISIT   Ragini Melendez 68 y.o. female MRN: [id number]  8/15/2024    Reason for Visit: Nephrotic range proteinuria    ASSESSMENT and PLAN:  It was a pleasure evaluating your patient in the office today. Thank you for allowing our team to participate in the care of Ms. Ragini Melendez. Please do not hesitate to contact our team if further issues/questions shall arise in the interim.     # Nonnephrotic range proteinuria, previously nephrotic range proteinuria  >>Workup   - Urine albumin to creatinine ratio 150 mg/g in 2017  - Urine protein to creatinine ratio almost 1 g in 12/2020 before initiating Avastin  - Urine protein to creatinine ratio started rising in 06/2022 and subsequently Avastin was held after last dose on 09/09/2022  - Urine protein to creatinine ratio peaked at 17 g in 04/2023  - Urine protein to creatinine ratio currently improved to 750 mg 04/2024  - Serum albumin improved, most recently 4.1 08/2024  - No clinical evidence of nephrotic syndrome  - HbA1c 5.2, MARY ANNE 2 R negative   - CHERI 1:80, dsDNA negative, no hypocomplementemia (no clinical evidence of lupus)  - Hep B/hep C/HIV negative  - Rheumatoid factor negative, cryoglobulin not detected  - Immunofixation showed polyclonal peak in IgG and IgA, serum free light chain ratio 1.8  - Worsening of proteinuria was related to use of VEGF inhibitor (based on literature review proteinuria can continue for several months even after stopping the medication) but she can have baseline glomerular pathology as proteinuria dates back to 2017  - Differential diagnosis includes proteinuria secondary to VEGF inhibitor versus membranous nephropathy in setting of malignancy (however malignancy is in remission) versus adaptive FSGS versus minimal-change disease   - No plans for kidney biopsy at this time as both kidneys are compromised with bilateral nephrostomy tubes in place (especially right kidney is mildly atrophic as compared to left kidney)  -  She has not tolerated RAAS inhibition due to low blood pressure   - Avastin has been on hold and there are no plans to restart the drug at this time  - She follows with hematology regarding biclonal gammopathy and no plans noted for bone marrow biopsy given normal total protein, albumin and calcium    # Recurrent endometrial cancer  - Diagnosis was made in 2017 and she underwent total abdominal hysterectomy and bilateral salpingo oophorectomy   - Status post ENDOBARR (rucaparib, atezolizumab, and bevacizumab) clinical trial started in 12/2020 and finished 12/2022  - Notes reviewed from gynecologic oncology (no evidence of disease) and plans noted for continued surveillance every 6 months or sooner with symptoms     # Chronic Kidney Disease Stage IV  - Etiology/risk factors: Hypertension, obstructive uropathy in setting of bilateral ureteral strictures, underlying glomerular pathology, age-related nephron loss   - Baseline Cr: 0.9-1.1 until 2022, then had several episodes of MONROE due to obstructive uropathy in setting of bilateral distal ureteral strictures and currently in the range of 0.0-2.5, most recently 1.92 08/01/2024  - Discussed natural history of progression of chronic kidney disease  - CKD modality education completed, patient is interested in peritoneal dialysis  - Once GFR persistently less than 15, we will make referral to see general surgery for candidacy of peritoneal dialysis catheter placement (this may be challenging due to previous history of abdominal surgery and radiation therapy which can increase the risk of adhesions and can make peritoneal dialysis difficult to perform).  - We will also make a referral to renal transplant as GFR is now persistently around 20 mL/min     # Bilateral ureteral strictures  - Notes reviewed from urology and etiology thought to be due to radiation therapy for endometrial cancer  - She currently has bilateral nephrostomy tubes and plans noted for ileal conduit  formation in October 2024  - She is currently urinating via nephrostomy tubes and the natural way     # BP/Volume   - Goal BP <120/80 per KDIGO guidelines   - Volume status: Euvolemic  - Status: Blood pressure currently at goal  - Current antihypertensive regimen: She was previously tried on lisinopril to retard progression of proteinuria but this was stopped due to low blood pressure  - Continue to observe off antihypertensive medications  - She also has a prescription to take furosemide 20 mg as needed but has not required this in several months     # Anemia in CKD  - Target Hb: More than 10 g/dL  - Most recent hemoglobin: 11 g/dL 04/2024  - Status post IV iron supplementation with oncology  - Ferritin 727, iron saturation 41 04/2024 suggestive of adequate iron stores  - No KENNEY given malignancy  - Defer management of anemia to hematology     # Electrolytes/Acid Base status   >>History of metabolic acidosis  - Goal serum bicarbonate level 24-26  - Most recent serum bicarbonate level 23 08/2024  - Continue sodium bicarbonate 650 mg 3 times daily     # CKD Mineral and Bone Disorder   - Goal Ca 8.5-10 mg/dL, goal Phos 2.7-4.6 mg/dL, goal iPTH 30-70 pg/mL  - Secondary hyperparathyroidism most likely in setting of chronic kidney disease and previous vitamin D insufficiency  -  02/2024 increased from 401 11/2023 increased from 246 and 07/2023  - 25 hydroxy vitamin D level 48.2 01/2024 on current dose of cholecalciferol  - Serum phosphorus level 4.6 08/2024 improved from 5.4 in 04/2024 increase PhosLo to 1 tablet 3 times daily as we are starting calcitriol  - Current Rx: Calcitriol 0.25 mcg 3 times per week (she only took for 1 month and then stop, resume calcitriol at same dose)  - Management of osteoporosis per endocrinology, they may consider Prolia in future, plans noted for ongoing surveillance    # Other issues   - History of DVT status posttreatment with Lovenox, currently off Lovenox  - History of gastric  bypass in 2001    # Follow-up  Function panel and PTH in 3 months  Full lab panel and follow-up in the office in 6 months    HPI:  Since last visit: She is doing well.  She denies dyspnea.  She denies leg swelling.  She continues to have leakage from her nephrostomy tubes.    Ragini Melendez is a 68 y.o. female who has history of endometrial cancer status post total abdominal hysterectomy and bilateral salpingo-oophorectomy, status post chemotherapy with Taxol and carboplatin in 2019, currently onrucaparib, atezolizumab, and bevacizumab.  Bevacizumab was held on last 2 cycles due to worsening proteinuria.  She has history of obesity for which he underwent gastric bypass surgery in 2001.  She gained weight and was requiring metformin for prediabetes and quinapril for hypertension before she was diagnosed with endometrial cancer.  Since diagnosis of endometrial cancer she has lost significant amount of weight; anti diabetic and antihypertensive medications were stopped.       >> Major risk factors for CKD  - Diabetes: Previously pre-diabetic on Metformin  - Hypertension: Previous history of hypertension but not on any medications   - Age ? 55 years: Yes   - Family history of kidney disease: Father had kidney stones, his cousin was on dialysis   - Obesity or metabolic syndrome: Yes      >> Medical history evaluation   - Prior kidney disease or dialysis: No  - Incidental hematuria in the past: Yes with ureteral stent in place   - Urinary symptoms: Urinary incontinence   - History of foamy or frothy urine: No   - History of nephrolithiasis: Yes but never passed a stone   - Diseases that share risk factors with CKD: DM, HTN  - Systemic diseases that might affect kidney: No   - History of use of medications that might affect renal function: No    PATIENT INSTRUCTIONS:    Patient Instructions   Your kidney function is currently stable at stage IV kidney disease.  Blood pressure is well-controlled.  For control of PTH,  "resume calcitriol 1 tablet 3 times a week.  Increase PhosLo to 1 tablet 3 times a day with meals.  Repeat renal function panel and PTH level in 3 months.  Repeat full panel of lab tests in 6 months before next office visit.    OBJECTIVE:  Current Weight: Weight - Scale: 68.9 kg (152 lb)  Vitals:    08/15/24 1450   BP: 124/71   BP Location: Left arm   Patient Position: Sitting   Cuff Size: Standard   Pulse: 59   Weight: 68.9 kg (152 lb)   Height: 4' 9\" (1.448 m)        Body mass index is 32.89 kg/m².      REVIEW OF SYSTEMS:    Review of Systems   Constitutional:  Negative for chills and fever.   HENT:  Negative for ear pain and sore throat.    Eyes:  Negative for pain and visual disturbance.   Respiratory:  Negative for cough and shortness of breath.    Cardiovascular:  Negative for chest pain and palpitations.   Gastrointestinal:  Negative for abdominal pain and vomiting.   Genitourinary:  Negative for dysuria and hematuria.   Musculoskeletal:  Negative for arthralgias and back pain.   Skin:  Negative for color change and rash.   Neurological:  Negative for seizures and syncope.   All other systems reviewed and are negative.      Past Medical History:   Diagnosis Date    Anemia     Anxiety     Cancer (HCC)     ENDOMETRIAL    Chemotherapy induced neutropenia (HCC) 8/30/2019    CKD (chronic kidney disease) stage 3, GFR 30-59 ml/min ([facility])     COVID     Fall 2022    Depression     DVT (deep venous thrombosis) ([facility]) 07/19/2019    RIGHT LEG    Endometrial cancer (HCC) 12/2017    GERD (gastroesophageal reflux disease)     H/O gastric bypass     Hard to intubate     pt denies AS OF 7/25/23    History of chemotherapy     started 12/2021endometrial cancer- done 12/23/22    History of DVT in adulthood     RLE    History of transfusion 04/13/2023    Hyperglycemia     vx type 2 dm -- last assessed 4/1/14; resolved 11/7/17    Hyperlipidemia     Hypertension     in past- no meds at present    Iron deficiency anemia     iron " infusions weekly    OAB (overactive bladder)     Port-A-Cath in place     Wears glasses        Past Surgical History:   Procedure Laterality Date    ABDOMINAL SURGERY      GASTRIC BYPASS; 2001    BREAST BIOPSY Right 06/28/2019    core biopsy; benign    CHOLECYSTECTOMY      at the time of gastric bypass    COLONOSCOPY      CT NEEDLE BIOPSY LYMPH NODE  07/08/2019    FL GUIDED CENTRAL VENOUS ACCESS DEVICE INSERTION  11/12/2019    FL RETROGRADE PYELOGRAM  03/30/2023    FL RETROGRADE PYELOGRAM  05/09/2023    FL RETROGRADE PYELOGRAM  8/1/2023    GASTRIC BYPASS      HYSTERECTOMY Bilateral 2017    total abdominal with salpingo-oophorectomy    IR NEPHROSTOMY TO NEPHROURETERAL STENT  06/09/2022    IR NEPHROSTOMY TO NEPHROURETERAL STENT  12/20/2022    IR NEPHROSTOMY TO NEPHROURETERAL STENT  8/8/2023    IR NEPHROSTOMY TUBE CHECK/CHANGE/REPOSITION/REINSERTION/UPSIZE  05/27/2022    IR NEPHROSTOMY TUBE CHECK/CHANGE/REPOSITION/REINSERTION/UPSIZE  11/10/2023    IR NEPHROSTOMY TUBE CHECK/CHANGE/REPOSITION/REINSERTION/UPSIZE  2/9/2024    IR NEPHROSTOMY TUBE CHECK/CHANGE/REPOSITION/REINSERTION/UPSIZE  4/12/2024    IR NEPHROSTOMY TUBE CHECK/CHANGE/REPOSITION/REINSERTION/UPSIZE  7/12/2024    IR NEPHROSTOMY TUBE PLACEMENT  07/26/2019    IR NEPHROSTOMY TUBE PLACEMENT  05/27/2023    IR NEPHROURETERAL STENT CHECK/CHANGE/REPOSITION  04/06/2021    IR NEPHROURETERAL STENT CHECK/CHANGE/REPOSITION  06/04/2021    IR NEPHROURETERAL STENT CHECK/CHANGE/REPOSITION  09/03/2021    IR NEPHROURETERAL STENT CHECK/CHANGE/REPOSITION  12/07/2021    IR NEPHROURETERAL STENT CHECK/CHANGE/REPOSITION  03/08/2022    IR NEPHROURETERAL STENT CHECK/CHANGE/REPOSITION  05/10/2022    IR NEPHROURETERAL STENT CHECK/CHANGE/REPOSITION  09/19/2022    IR PICC LINE  09/27/2019    IR PORT PLACEMENT  07/26/2019    IR PORT PLACEMENT      IR PORT REMOVAL  09/20/2019    OOPHORECTOMY Bilateral 2017    CO CYSTO BLADDER W/URETERAL CATHETERIZATION Right 03/30/2023    Procedure:  CYSTOSCOPY RETROGRADE PYELOGRAM WITH exchange of STENT URETERAL;  Surgeon: Demetrius Lowe MD;  Location: [facility];  Service: Urology    RI CYSTO BLADDER W/URETERAL CATHETERIZATION Bilateral 05/09/2023    Procedure: CYSTOSCOPY, BILATERAL RETROGRADE PYELOGRAM, WITH LEFT INSERTION STENT URETERAL, RIGHT URTERAL STENT EXCHANGE;  Surgeon: Nicola Hannah MD;  Location: [facility];  Service: Urology    RI CYSTO BLADDER W/URETERAL CATHETERIZATION Bilateral 8/1/2023    Procedure: CYSTOSCOPY BILATERAL RETROGRADE  WITH REMOVAL BILATERAL  STENT URETERAL;  Surgeon: Demetrius Lowe MD;  [facility]: [facility];  Service: Urology    RI CYSTO W/INSERT URETERAL STENT Right 03/30/2023    Procedure: EXCHANGE STENT URETERAL;  Surgeon: Demetrius Lowe MD;  [facility]: [facility];  Service: Urology    RI INSJ TUNNELED CTR VAD W/SUBQ PORT AGE 7 YR/> Left 11/12/2019    Procedure: INSERTION VENOUS PORT ( PORT-A-CATH) IR;  Surgeon: Edilberto Guzman DO;  [facility];  Service: Interventional Radiology    RI LAPS ABD PRTM&OMENTUM DX W/WO SPEC BR/WA SPX N/A 12/19/2017    Procedure: LAPAROSCOPY DIAGNOSTIC;  Surgeon: Evgeny So MD;  [facility]: [facility];  Service: Gynecology Oncology    RI LAPS W/RAD HYST W/BILAT LMPHADEC RMVL TUBE/OVARY N/A 12/19/2017    Procedure: HYSTERECTOMY LAPAROSCOPIC TOTAL (LTH) W/ ROBOTICS; BILATERAL SALPINGOOPHERECTOMY; LYMPH NODE DISSECTION; lysis of adhesions;  Surgeon: Evgeny So MD;  [facility];  Service: Gynecology Oncology    REMOVAL URETERAL STENT Bilateral 8/1/2023    Procedure: REMOVAL STENT URETERAL;  Surgeon: Demetrius Lowe MD;  [facility];  Service: Urology    TONSILLECTOMY      US GUIDED BREAST BIOPSY RIGHT COMPLETE Right 06/28/2019       Family History   Problem Relation Age of Onset    Hyperlipidemia Mother     Heart disease Mother     Ovarian cancer Mother 50    Colon cancer Mother     Lymphoma Father     Breast cancer Sister 63     No Known Problems Brother     No Known Problems Brother     No Known Problems Maternal Grandmother     Bone cancer Maternal Grandfather     Uterine cancer Paternal Grandmother     No Known Problems Paternal Grandfather     No Known Problems Maternal Aunt     No Known Problems Maternal Aunt     No Known Problems Maternal Aunt     No Known Problems Maternal Aunt     No Known Problems Paternal Aunt     No Known Problems Paternal Aunt     No Known Problems Paternal Aunt     No Known Problems Paternal Aunt         Social History     Substance and Sexual Activity   Alcohol Use Never     Social History     Substance and Sexual Activity   Drug Use Never     Social History     Tobacco Use   Smoking Status Never   Smokeless Tobacco Never       PHYSICAL EXAM:      Physical Exam  Constitutional:       Appearance: Normal appearance.   HENT:      Head: Normocephalic and atraumatic.   Cardiovascular:      Rate and Rhythm: Normal rate and regular rhythm.   Pulmonary:      Effort: Pulmonary effort is normal.      Breath sounds: Normal breath sounds.   Abdominal:      Tenderness: There is no right CVA tenderness or left CVA tenderness.   Musculoskeletal:         General: Normal range of motion.      Right lower leg: No edema.      Left lower leg: No edema.   Skin:     General: Skin is warm.   Neurological:      Mental Status: She is alert and oriented to person, place, and time. Mental status is at baseline.   Psychiatric:         Mood and Affect: Mood normal.       Medications:    Current Outpatient Medications:     ARIPiprazole (ABILIFY) 20 MG tablet, Take 20 mg by mouth every morning, Disp: , Rfl:     BD PosiFlush 0.9 % SOLN, , Disp: , Rfl:     [START ON 8/16/2024] calcitriol (ROCALTROL) 0.25 mcg capsule, Take 1 capsule (0.25 mcg total) by mouth 3 (three) times a week, Disp: 12 capsule, Rfl: 3    calcium acetate (PHOSLO) capsule, Take 1 capsule (667 mg total) by mouth 3 (three) times a day with meals, Disp: 180 capsule, Rfl:  3    Calcium-Magnesium-Vitamin D (CALCIUM 500 PO), Take 1 capsule by mouth daily at bedtime, Disp: , Rfl:     cetirizine (ZyrTEC) 10 mg tablet, Take 1 tablet (10 mg total) by mouth daily, Disp: 90 tablet, Rfl: 1    cholecalciferol (VITAMIN D3) 1,000 units tablet, Take 4 tablets (4,000 Units total) by mouth daily (Patient taking differently: Take 4,000 Units by mouth daily at bedtime), Disp: 90 tablet, Rfl: 0    Cyanocobalamin (VITAMIN B12 PO), Take 1 capsule by mouth daily after lunch, Disp: , Rfl:     fluticasone (FLONASE) 50 mcg/act nasal spray, 1 spray into each nostril daily, Disp: 15.8 mL, Rfl: 0    folic acid (FOLVITE) 1 mg tablet, Take 1 tablet (1 mg total) by mouth daily (Patient taking differently: Take 1 mg by mouth daily at bedtime), Disp: , Rfl: 0    furosemide (LASIX) 20 mg tablet, TAKE 1 TABLET (20 MG TOTAL) BY MOUTH IF NEEDED (FOR INCREASED LEG SWELLING OR WEIGHT GAIN >2 LBS IN 1 DAY), Disp: 90 tablet, Rfl: 1    Multiple Vitamin (MULTIVITAMIN) tablet, Take 1 tablet by mouth every morning, Disp: , Rfl:     omeprazole (PriLOSEC) 20 mg delayed release capsule, Take 20 mg by mouth every morning, Disp: , Rfl:     oxybutynin (DITROPAN XL) 15 MG 24 hr tablet, Take 1 tablet (15 mg total) by mouth daily at bedtime, Disp: 90 tablet, Rfl: 1    senna (SENOKOT) 8.6 MG tablet, Take 1 tablet (8.6 mg total) by mouth 2 (two) times a day as needed for constipation, Disp: 60 tablet, Rfl: 0    sodium bicarbonate 650 mg tablet, Take 1 tablet (650 mg total) by mouth 3 (three) times a day, Disp: 270 tablet, Rfl: 3    venlafaxine (EFFEXOR-XR) 75 mg 24 hr capsule, TAKE 3 CAPS DAILY, Disp: , Rfl:     Vibegron (Gemtesa) 75 MG TABS, Take 1 capsule by mouth every morning, Disp: 90 tablet, Rfl: 3    sodium chloride, PF, 0.9 %, 10 mL by Intracatheter route daily for 120 doses Intracatheter flushing daily. May substitute prefilled syringe with normal saline 10 mL vials, 10 mL syringes, and 18 g blunt needles, Disp: 300 mL, Rfl: 3     "sodium chloride, PF, 0.9 %, 10 mL by Intracatheter route daily for 120 doses Intracatheter flushing daily. May substitute prefilled syringe with normal saline 10 mL vials, 10 mL syringes, and 18 g blunt needles, Disp: 300 mL, Rfl: 3    sodium chloride, PF, 0.9 %, 10 mL by Intracatheter route daily for 90 doses, Disp: 300 mL, Rfl: 2    sodium chloride, PF, 0.9 %, 10 mL by Intracatheter route daily Intracatheter flushing daily. May substitute prefilled syringe with normal saline 10 mL vials, 10 mL syringes, and 18 g blunt needles, Disp: 300 mL, Rfl: 0    Laboratory Results:        Invalid input(s): \"ALBUMIN\"      Results for orders placed or performed during the hospital encounter of 08/01/24   Renal function panel   Result Value Ref Range    Albumin 4.1 3.5 - 5.0 g/dL    Calcium 8.8 8.4 - 10.2 mg/dL    Phosphorus 4.6 (H) 2.3 - 4.1 mg/dL    Glucose 92 65 - 140 mg/dL    BUN 44 (H) 5 - 25 mg/dL    Creatinine 1.92 (H) 0.60 - 1.30 mg/dL    Sodium 135 135 - 147 mmol/L    Potassium 4.3 3.5 - 5.3 mmol/L    Chloride 103 96 - 108 mmol/L    CO2 23 21 - 32 mmol/L    ANION GAP 9 4 - 13 mmol/L    eGFR 26 ml/min/1.73sq m    Glucose, Fasting 92 65 - 99 mg/dL     *Note: Due to a large number of results and/or encounters for the requested time period, some results have not been displayed. A complete set of results can be found in Results Review.         "

## 2024-08-15 NOTE — PATIENT INSTRUCTIONS
Your kidney function is currently stable at stage IV kidney disease.  Blood pressure is well-controlled.  For control of PTH, resume calcitriol 1 tablet 3 times a week.  Increase PhosLo to 1 tablet 3 times a day with meals.  Repeat renal function panel and PTH level in 3 months.  Repeat full panel of lab tests in 6 months before next office visit.

## 2024-08-19 ENCOUNTER — TELEPHONE (OUTPATIENT)
Age: 69
End: 2024-08-19

## 2024-08-20 DIAGNOSIS — N18.9 CHRONIC KIDNEY DISEASE, UNSPECIFIED CKD STAGE: ICD-10-CM

## 2024-08-20 DIAGNOSIS — N13.30 HYDRONEPHROSIS: ICD-10-CM

## 2024-08-20 DIAGNOSIS — N17.9 ACUTE KIDNEY INJURY (HCC): ICD-10-CM

## 2024-08-20 RX ORDER — SODIUM CHLORIDE 9 MG/ML
10 INJECTION, SOLUTION INTRAMUSCULAR; INTRAVENOUS; SUBCUTANEOUS DAILY
Qty: 300 ML | Refills: 0 | Status: SHIPPED | OUTPATIENT
Start: 2024-08-20 | End: 2024-11-18

## 2024-08-23 ENCOUNTER — OFFICE VISIT (OUTPATIENT)
Dept: HEMATOLOGY ONCOLOGY | Facility: CLINIC | Age: 69
End: 2024-08-23
Payer: COMMERCIAL

## 2024-08-23 VITALS
HEIGHT: 57 IN | BODY MASS INDEX: 32.58 KG/M2 | WEIGHT: 151 LBS | TEMPERATURE: 97.4 F | RESPIRATION RATE: 16 BRPM | SYSTOLIC BLOOD PRESSURE: 130 MMHG | HEART RATE: 72 BPM | DIASTOLIC BLOOD PRESSURE: 88 MMHG

## 2024-08-23 DIAGNOSIS — D50.8 IRON DEFICIENCY ANEMIA SECONDARY TO INADEQUATE DIETARY IRON INTAKE: ICD-10-CM

## 2024-08-23 DIAGNOSIS — D47.2 MGUS (MONOCLONAL GAMMOPATHY OF UNKNOWN SIGNIFICANCE): Primary | ICD-10-CM

## 2024-08-23 DIAGNOSIS — Z86.718 HISTORY OF DVT (DEEP VEIN THROMBOSIS): ICD-10-CM

## 2024-08-23 PROCEDURE — 99214 OFFICE O/P EST MOD 30 MIN: CPT | Performed by: PHYSICIAN ASSISTANT

## 2024-08-23 NOTE — PROGRESS NOTES
Hematology/Oncology Outpatient Follow- up Note  Ragini Melendez 68 y.o. female MRN: @ Encounter: [id number]        Date:  8/23/2024      Assessment / Plan:    1. Chronic Anemia, multifactorial 2nd to iron deficiency, chronic kidney disease (cr 2-2.2 in 2023- 2024); chronic disease.      Hgb 10.5 7/7/2019 with platelet count 458 (7/30/2019 C1D1 Taxol Carbo)  Hemoglobin 9 - 11g/dL range in 2019 when undergoing chemo/rt for endometrial ca.  10/2/2019 iron saturation 17%; ferritin 1000.     6/27/23 Hemoglobin 9.1 with MCV 97.     11/16/23 hemoglobin 9.8, MCV 95, white blood cell count 8.11, platelets 298     4/3/24  hemoglobin 11          2. Iron deficiency in a patient who is status post gastric bypass surgery in 2001.  Chronic elevation of ferritin 400-1600 (secondary to inflammation). Iron saturation 14% to 39% since 2019     May 2023 History of Acute on chronic anemia. She received 1 unit packed red blood cells during hospitalization when hemoglobin decreased to 6.9.      February through April 2023 - Venofer 200 mg weekly x8 (Iron saturation 14% 1/2021 -> 39% 5/4/2023 -> 20% 6/27/23).    EGD and colonoscopy 12/2023 - unremarkable.     4/2024 iron saturation 41%, ferritin 727       3. B12 deficiency. 5/2023 normal MMA; B12 level 821. She does take oral B12 daily.     4/23/24 B12 1100        4.   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    4/2024 normal quantitative immunoglobulins  4/2024 no monoclonal bands on SPEP  Will defer additional monitoring.     5. Endometrial Ca. Diagnosed initially 2017. S/P total laparoscopic hysterectomy and bilateral salpingo-oophorectomy and bilateral pelvic lymph node dissection December 2017.  2019 -presented with right lower extremity DVT. CT identified right pelvic  sidewall mass with venous, ureteral nerve compression  7/8/2019 LN biopsy- High-grade adenocarcinoma.  She received Taxol carboplatin x6 doses July 2019 through January 2020. She received adjuvant external beam RT February 24 through April 20, 2020.  11/2020 -new retroperitoneal lymphadenopathy identified. 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         6. Ureteral strictures, hydronephrosis 2nd to pelvic RT.   She has had bilateral ureteral stents in place. Hospitalized x 2 5/2023 with [facility].  Percutaneous nephrostomy tubes placed 5/26 - 5/31/23 admission. She has had nephrostomy tubes placed multiple times over the years since 2019. Ureteral stents removed 8/1/23 by Dr. Lowe. PCN tubes remain in place.      Cr 2 - 2.1 7/2023 - 8/2023.   She anticipates having a stoma created     7 History of RLE DVT dx in 2019. Induced 2nd to malignancy. Was on Lovenox. D/C'd 6/2023 per gyn/onc.     8. L5 compression deformity on 11/2020 CT as well as incompletely healed fx superior pubic ramus. Sacral sclerosis which may be 2nd to radiation, insufficiency fractures.   On 8/2020 pet/ct - right medial pubic bone developing fracture noted.    9.  Dexa scan 11/29/23 -osteoporosis T-score -2.5 left femoral neck and -2.6 in the left total hip, -2.5 lumbar spine.   She was referred to rheumatology by PCP for management. Given her renal function, serial DEXA scans recommended as she is undergoing transplant evaluation for her kidneys.         At this time, as her iron is replete, hemoglobin is at the top of her baseline, she is monitored closely by multiple providers, we discussed as needed follow-up in our office.  She is agreeable to this.         HPI: Ragini Melendez is seen for initial consultation 2/8/2023 regarding iron deficiency.      The patient has a  history of endometrial cancer. S/P surgery 2017, chemo/RT for pelvic recurrence 2019; immunotherapy/ monoclonal antibody treatment on clinical trial [id number].  History of left lower extremity DVT in 2020 (time of recurrence of endometrial cancer). She was on anticoagulation through June 2023     History of gastric bypass surgery in 2001 by Dr. Mcmahon. Follows with [facility]  History of hypertension, myofascial pain, osteoporosis, right pubis fracture, chronic kidney disease, hydronephrosis, depression, GERD, hyperlipidemia, overactive bladder.  10/2016 hemoglobin 12.3, MCV 86,  October 2017 hemoglobin 12.5, MCV 83     9/2019 EGD esophagus appeared normal, very small gastric pouch, normal gastrojejunal anastomosis  10/2022 no monoclonal bands on SPEP.  10/31/22 CHERI +1:80; homogenous pattern; normal double-stranded antibody  Nonreactive hepatitis B and C antibodies. HIV nonreactive     Venofer 200 mg weekly x 8 February through April 2023        May 2023 acute kidney injury secondary to hydronephrosis secondary to endometrial cancer requiring percutaneous nephrostomy tubes        5/2023 normal MMA; B12 level 821     6/27/23 SPEP biclonal gammopathy  M1 IgG kappa 0.17 g/dL  M2 IgG lambda 0.36 g/dL  IgG 1700           Interval History:   12/12/23 EGD and colonoscopy-colonoscopy normal. No further screening colonoscopy recommended as she is greater than 65 years old.  Previous gastric bypass surgery in the stomach. Otherwise normal      Review of Systems   Constitutional:  Positive for fatigue.   Musculoskeletal:  Positive for arthralgias.        Test Results:        Labs:   Lab Results   Component Value Date    HGB 11.0 (L) 04/23/2024    HCT 36.1 04/23/2024    MCV 96 04/23/2024     04/23/2024    WBC 6.49 04/23/2024    NRBC 0 04/23/2024    BANDSPCT 1 05/12/2023    ATYLMPCT 3 (H) 01/17/2020     Lab Results   Component Value Date     10/27/2017    K 4.3 08/01/2024     08/01/2024    CO2 23  08/01/2024    BUN 44 (H) 08/01/2024    CREATININE 1.92 (H) 08/01/2024    GLUCOSE 219 (H) 12/19/2017    GLUF 92 08/01/2024    CALCIUM 8.8 08/01/2024    CORRECTEDCA 9.8 07/27/2023    AST 21 04/23/2024    ALT 32 04/23/2024    ALKPHOS 133 (H) 04/23/2024    PROT 6.9 10/27/2017    BILITOT 0.3 10/27/2017    EGFR 26 08/01/2024           Imaging: CT chest abdomen pelvis wo contrast    Result Date: 7/28/2024  Narrative: CT CHEST, ABDOMEN AND PELVIS WITHOUT IV CONTRAST INDICATION: Z08: Encounter for follow-up examination after completed treatment for malignant neoplasm Z85.42: Personal history of malignant neoplasm of other parts of uterus. COMPARISON: CT abdomen pelvis 5/26/2023, CT chest abdomen and pelvis 3/15/2023. TECHNIQUE: CT examination of the chest, abdomen and pelvis was performed without intravenous contrast. Multiplanar 2D reformatted images were created from the source data. This examination, like all CT scans performed in the [facility], was performed utilizing techniques to minimize radiation dose exposure, including the use of iterative reconstruction and automated exposure control. Radiation dose length product (DLP) for this visit: 652 mGy-cm Enteric Contrast: Not administered. FINDINGS: CHEST LUNGS: Lungs are clear. No pulmonary nodules. Accessory azygous lobe noted. No tracheal or endobronchial lesion. PLEURA: Unremarkable. HEART/GREAT VESSELS: Heart is unremarkable for patient's age. Mitral annular calcification. No thoracic aortic aneurysm. Chest port catheter terminating at the superior cavoatrial junction. MEDIASTINUM AND KATIANA: No lymphadenopathy within the limitations of noncontrast technique. CHEST WALL AND LOWER NECK: Left chest port. ABDOMEN LIVER/BILIARY TREE: Unremarkable. GALLBLADDER: Post cholecystectomy. SPLEEN: Unremarkable. PANCREAS: Unremarkable. ADRENAL GLANDS: Unremarkable. KIDNEYS/URETERS: Atrophic right kidney with percutaneous nephrostomy present. There is a right upper  pole simple cyst. Limited evaluation of the renal parenchyma on noncontrast evaluation. The right ureter is nondilated. A left percutaneous nephrostomy is present and there is mild left pelviectasis, stable from previous examination. Left ureter is nondilated. There are two 2 mm calculi in the left lower pole. STOMACH AND BOWEL: Gastric bypass changes. No dilated loops of bowel. There is a large burden of fecal material throughout the colon suggesting constipation. APPENDIX: No findings to suggest appendicitis. ABDOMINOPELVIC CAVITY: No ascites. No pneumoperitoneum. Calcified aortocaval lymph node measuring 1.4 x 0.9 cm (2/120) is mildly decreased from prior when it measured 1.6 x 1.0 cm. There is no new or enlarging lymphadenopathy within the limitations of a noncontrast exam. VESSELS: Mild atherosclerotic calcifications. No abdominal aortic aneurysm. PELVIS REPRODUCTIVE ORGANS: Post hysterectomy. URINARY BLADDER: Bilobed appearance to the bladder, possibly reflecting presence of a diverticulum. This is not well evaluated on noncontrast exam particularly given only partial degree of bladder distention. ABDOMINAL WALL/INGUINAL REGIONS: Subcutaneous injection sites along the lower abdominal wall. Collateral vessels in the pelvic subcutaneous tissues. Bilateral percutaneous nephrostomies. BONES: No acute fracture or suspicious osseous lesion. Spinal degenerative changes. Unchanged L5 compression deformity. Chronic left rib deformities.     Impression: 1.  No evidence of metastatic disease in the chest, abdomen, or pelvis. 2.  Bilateral percutaneous nephrostomies with mild left pelviectasis. 3.  Constipation. Workstation performed: [id number]             Allergies:   Allergies   Allergen Reactions    Cephalosporins Rash     Which Cephalosporin reaction was to not specified; however, has tolerated Amoxicillin, Cefazolin, and Cefepime     Current Medications: Reviewed  PMH/FH/SH:  Reviewed      Physical Exam:    1.6  "meters squared    Ht Readings from Last 3 Encounters:   08/23/24 4' 9\" (1.448 m)   08/15/24 4' 9\" (1.448 m)   08/13/24 4' 9\" (1.448 m)        Wt Readings from Last 3 Encounters:   08/23/24 68.5 kg (151 lb)   08/15/24 68.9 kg (152 lb)   08/13/24 68.5 kg (151 lb)        Temp Readings from Last 3 Encounters:   08/23/24 (!) 97.4 °F (36.3 °C) (Temporal)   07/12/24 (!) 97.1 °F (36.2 °C) (Temporal)   04/12/24 97.8 °F (36.6 °C) (Oral)        BP Readings from Last 3 Encounters:   08/23/24 130/88   08/15/24 124/71   08/13/24 112/60             Physical Exam  Constitutional:       Appearance: She is well-developed.   HENT:      Head: Normocephalic and atraumatic.   Pulmonary:      Effort: Pulmonary effort is normal. No respiratory distress.   Neurological:      Mental Status: She is alert.   Psychiatric:         Behavior: Behavior normal.         ECOG:       Emergency Contacts:    Extended Emergency Contact Information  Primary Emergency Contact: Nahomi Melendez  Home Phone: [phone]  Mobile Phone: [phone]  Relation: Sister  Secondary Emergency Contact: Fernando Melendez  Mobile Phone: [phone]  Relation: Brother   "

## 2024-08-27 ENCOUNTER — PREP FOR PROCEDURE (OUTPATIENT)
Dept: GYNECOLOGIC ONCOLOGY | Facility: CLINIC | Age: 69
End: 2024-08-27

## 2024-08-27 DIAGNOSIS — Z79.01 LONG TERM (CURRENT) USE OF ANTICOAGULANTS: ICD-10-CM

## 2024-08-27 DIAGNOSIS — N13.5 URETERAL STRICTURE: Primary | ICD-10-CM

## 2024-08-27 DIAGNOSIS — R39.89 SUSPECTED UTI: ICD-10-CM

## 2024-08-27 DIAGNOSIS — Z01.810 PREOP CARDIOVASCULAR EXAM: ICD-10-CM

## 2024-08-27 DIAGNOSIS — Z01.812 PRE-PROCEDURE LAB EXAM: ICD-10-CM

## 2024-08-27 DIAGNOSIS — Z01.818 PREOP EXAMINATION: ICD-10-CM

## 2024-08-28 NOTE — TELEPHONE ENCOUNTER
"Patient was calling to speak to the OR . Reached out and was told to tell the patient \"working Dr. Alfonso office to work on a date and will call her back\"    Patient said okay and thank you. She will wait for a call back  "

## 2024-08-29 ENCOUNTER — TELEPHONE (OUTPATIENT)
Dept: RHEUMATOLOGY | Facility: CLINIC | Age: 69
End: 2024-08-29

## 2024-08-29 NOTE — TELEPHONE ENCOUNTER
Please call to offer her sooner virtual appointment, I discussed her case with colleagues and think that we should discuss possibly starting Prolia    Can schedule on a Tuesday afternoon virtual only, any time in the next 3 months

## 2024-08-29 NOTE — TELEPHONE ENCOUNTER
I called pt this afternoon to inform her we are looking at getting her scheduled for her procedure with Dr. Lowe and Dr. Rodriguez on 10/24/24 at the [facility]. Pt confirmed that this date will work for her. I will call her back once I confirm with Dr. Lowe how long the case will take and then we can go over her pre op instructions and prep information. Pt thanked me for the call and will wait to hear back from me.

## 2024-09-05 NOTE — TELEPHONE ENCOUNTER
Spoke with patient and confirmed surgery date of: 10/24/2024  Type of surgery: Urinary Diversion w/ ileal Conduit  Operating physician: Dr. Lowe  Location of surgery: [facility]    Verbally went over prep with patient on: 9/5/2024  NPO  Bowel prep? Yes  Hospital calls afternoon prior with arrival time -Calls Friday afternoon for Monday surgeries  Patient needs ride to and from surgery Surgery Admit  Pre-op testing to be done 2 weeks prior to surgery  CBC, BMP, PT/INR, PTT, Type and Screen, Urine Culture and EKG  Blood thinners:   NONE  Clearances needed: Medical    Mailed/emailed to patient on: 9/5/2024  Copy of packet scanned into Media on: 9/5/2024  Labs in packet  Soap / Bowel prep in packet  Pre-op & Post-op in packet  Dates of H&P and post-op if needed    Consent: in Media   If needed:    Medical/Cardiac Clearance: Medical  Appt with: Dr. Yost  Appt date and time:10/3/2024 at 1:00 PM  Date clearance form faxed:  Best fax number:    Clearance note will be in EPIC at the time of appt    RBC blood bank done on: 9/5/2024

## 2024-09-13 ENCOUNTER — HOSPITAL ENCOUNTER (OUTPATIENT)
Dept: INFUSION CENTER | Facility: CLINIC | Age: 69
Discharge: HOME | End: 2024-09-13
Payer: COMMERCIAL

## 2024-09-13 DIAGNOSIS — Z45.2 ENCOUNTER FOR CENTRAL LINE CARE: Primary | ICD-10-CM

## 2024-09-13 DIAGNOSIS — C54.1 ENDOMETRIAL CANCER (HCC): ICD-10-CM

## 2024-09-13 PROCEDURE — 96523 IRRIG DRUG DELIVERY DEVICE: CPT

## 2024-09-13 NOTE — PROGRESS NOTES
Tolerated procedure without incident: No adverse reactions noted: Verified follow up appt with patient ( 10/14/24 ): AVS offered and declined

## 2024-09-13 NOTE — PROGRESS NOTES
Patient to [facility]fusion [facility] for Port Maintenance: Offers no complaints at present time: Left PAC accessed without difficulty: Good blood return noted

## 2024-09-16 DIAGNOSIS — N18.9 CHRONIC KIDNEY DISEASE, UNSPECIFIED CKD STAGE: ICD-10-CM

## 2024-09-16 DIAGNOSIS — N13.30 HYDRONEPHROSIS: ICD-10-CM

## 2024-09-16 DIAGNOSIS — N17.9 ACUTE KIDNEY INJURY (HCC): ICD-10-CM

## 2024-09-16 RX ORDER — SODIUM CHLORIDE 9 MG/ML
10 INJECTION, SOLUTION INTRAMUSCULAR; INTRAVENOUS; SUBCUTANEOUS DAILY
Qty: 300 ML | Refills: 0 | Status: SHIPPED | OUTPATIENT
Start: 2024-09-16 | End: 2024-12-15

## 2024-09-16 RX ORDER — SODIUM CHLORIDE 9 MG/ML
10 INJECTION, SOLUTION INTRAMUSCULAR; INTRAVENOUS; SUBCUTANEOUS DAILY
Qty: 300 ML | Refills: 2 | Status: SHIPPED | OUTPATIENT
Start: 2024-09-16 | End: 2024-09-16 | Stop reason: SDUPTHER

## 2024-09-17 ENCOUNTER — TELEMEDICINE (OUTPATIENT)
Dept: RHEUMATOLOGY | Facility: CLINIC | Age: 69
End: 2024-09-17
Payer: COMMERCIAL

## 2024-09-17 DIAGNOSIS — N18.31 CHRONIC KIDNEY DISEASE, STAGE 3A (HCC): ICD-10-CM

## 2024-09-17 DIAGNOSIS — M81.0 OSTEOPOROSIS WITHOUT CURRENT PATHOLOGICAL FRACTURE, UNSPECIFIED OSTEOPOROSIS TYPE: Primary | ICD-10-CM

## 2024-09-17 PROCEDURE — G2211 COMPLEX E/M VISIT ADD ON: HCPCS | Performed by: STUDENT IN AN ORGANIZED HEALTH CARE EDUCATION/TRAINING PROGRAM

## 2024-09-17 PROCEDURE — 99214 OFFICE O/P EST MOD 30 MIN: CPT | Performed by: STUDENT IN AN ORGANIZED HEALTH CARE EDUCATION/TRAINING PROGRAM

## 2024-09-17 NOTE — ASSESSMENT & PLAN NOTE
Discussed case with colleagues; given her renal function, feel that Prolia is the best option at this time    Discussed r:b including ONJ risk, she is amenable    Going on vacay for a week so will have our office call her to schedule Prolia appointments and 1 year follow up with me once she returns    Advised to continue calcium and VitD supplementation

## 2024-09-17 NOTE — PROGRESS NOTES
Telemedicine consent    Patient: Ragini Melendez  Provider: Jordan Alvarado DO  Provider located at [facility]  [address]  [place] [address]    The patient was identified by name and date of birth. Ragini Melendez was informed that this is a telemedicine visit and that the visit is being conducted through the [employer] platform. She agrees to proceed..  My office door was closed. No one else was in the room.  She acknowledged consent and understanding of privacy and security of the video platform. The patient has agreed to participate and understands they can discontinue the visit at any time.    Patient is aware this is a billable service.     I spent 6 minutes with the patient during this visit.      Ambulatory Visit  Name: Ragini Melendez      : 1955      MRN: [id number]  Encounter Provider: Jordan Alvarado DO  Encounter Date: 2024   Encounter department: [facility]    Assessment & Plan  Osteoporosis without current pathological fracture, unspecified osteoporosis type  Discussed case with colleagues; given her renal function, feel that Prolia is the best option at this time    Discussed r:b including ONJ risk, she is amenable    Going on vacay for a week so will have our office call her to schedule Prolia appointments and 1 year follow up with me once she returns    Advised to continue calcium and VitD supplementation       Patient's rheumatologic disease(s) threaten long-term function if not appropriately treated.    History of Present Illness       She has thought about Prolia and is amenable    Permanent history: First saw me 2024 for osteoporosis complicated by R pubic ramus fracture and proximal humerus fracture; endometrial cancer s/p chemortx complicated by CKD4, history RLE DVT, history Reclast x1 in .               Objective     LMP  (LMP Unknown)     General:  well-appearing, NAD

## 2024-09-18 RX ORDER — SODIUM BICARBONATE 650 MG/1
650 TABLET ORAL 3 TIMES DAILY
Qty: 270 TABLET | Refills: 1 | Status: SHIPPED | OUTPATIENT
Start: 2024-09-18

## 2024-09-26 ENCOUNTER — RA CDI HCC (OUTPATIENT)
Dept: OTHER | Facility: HOSPITAL | Age: 69
End: 2024-09-26

## 2024-10-03 ENCOUNTER — CONSULT (OUTPATIENT)
Dept: FAMILY MEDICINE CLINIC | Facility: CLINIC | Age: 69
End: 2024-10-03

## 2024-10-03 VITALS
OXYGEN SATURATION: 98 % | DIASTOLIC BLOOD PRESSURE: 80 MMHG | HEIGHT: 57 IN | HEART RATE: 77 BPM | BODY MASS INDEX: 34.09 KG/M2 | SYSTOLIC BLOOD PRESSURE: 110 MMHG | WEIGHT: 158 LBS

## 2024-10-03 DIAGNOSIS — F33.9 DEPRESSION, RECURRENT (HCC): ICD-10-CM

## 2024-10-03 DIAGNOSIS — Z23 ENCOUNTER FOR IMMUNIZATION: ICD-10-CM

## 2024-10-03 DIAGNOSIS — C54.1 ENDOMETRIAL CANCER (HCC): ICD-10-CM

## 2024-10-03 DIAGNOSIS — T45.1X5A CHEMOTHERAPY INDUCED NEUTROPENIA (HCC): ICD-10-CM

## 2024-10-03 DIAGNOSIS — D70.1 CHEMOTHERAPY INDUCED NEUTROPENIA (HCC): ICD-10-CM

## 2024-10-03 DIAGNOSIS — S60.511A ABRASION OF RIGHT HAND, INITIAL ENCOUNTER: ICD-10-CM

## 2024-10-03 DIAGNOSIS — N13.5 URETERAL STRICTURE: ICD-10-CM

## 2024-10-03 DIAGNOSIS — Z01.818 PREOP EXAMINATION: Primary | ICD-10-CM

## 2024-10-03 DIAGNOSIS — N18.31 CHRONIC KIDNEY DISEASE, STAGE 3A (HCC): ICD-10-CM

## 2024-10-03 DIAGNOSIS — D64.89 DRUG-INDUCED ANEMIA: ICD-10-CM

## 2024-10-03 DIAGNOSIS — D50.9 IRON DEFICIENCY ANEMIA, UNSPECIFIED IRON DEFICIENCY ANEMIA TYPE: ICD-10-CM

## 2024-10-03 NOTE — PROGRESS NOTES
Assessment/Plan:   Diagnoses and all orders for this visit:    Preop examination  -     Ambulatory Referral to Cardiology; Future  - pt scheduled for ILEAL CONDUIT urinary diversion (No cystectomy) (Bladder) by Dr Lowe on 10/24/2024 for treatment of Ureteral stricture at [facility]   - reviewed PMHx, PSHx, meds, allergies, Fhx and Soc Hx  - pt received annual Flu vaccine in the office today   - (+) R-hand abrasion - received Tdap in the office today   - has pre-op labs and EKG pending - advised pt to schedule c/s with Cardio given PMHx - pt aware and agreeable   - STOP MVI, Fish Oil, Vit D, Vit B12, NSAIDs 1wk prior to surgical procedure     PT ADVISED TO GET CARDIAC CLEARANCE PRIOR TO SURGICAL PROCEDURE     Ureteral stricture    Encounter for immunization  -     influenza vaccine, high-dose, PF 0.5 mL (Fluzone High Dose)    Abrasion of right hand, initial encounter  -     TDAP VACCINE GREATER THAN OR EQUAL TO 8YO IM    Endometrial cancer (HCC)  Iron deficiency anemia, unspecified iron deficiency anemia type  Drug-induced anemia  Chemotherapy induced neutropenia (HCC)    Chronic kidney disease, stage 3a (HCC)    Depression, recurrent (HCC)          Subjective:    Patient ID: Ragini Melendez is a 69 y.o. female.  Ragini Melendez is a 69 y.o. female who presents to the office for pre-op exam   - pt scheduled for ILEAL CONDUIT urinary diversion (No cystectomy) (Bladder) by Dr Lowe on 10/24/2024 for treatment of Ureteral stricture at [facility]  - PMHx: Endometrial Ca, HTN, HL, anemia, drug induced anemia, chemo induced neutropenia, Depression, h/o DVT, Osteoporosis  - allergies: Cephalosporin - rash   - Meds: see med rec   - PSHx: reviewed  - FHx: reviewed   - Immunizations: UTD with COVID IMMs and Boosters, UTD with PCV20, interested in Tdap and annual Flu vaccine   - diet/exercise: active, trying to gain weight after cancer treatment   - social: denies tob/EtOH  - ROS: today in the office pt denies F/C/N/V/HA/visual  "changes/palpitations/SOB/wheezing/abd pain/D/LE edema          The following portions of the patient's history were reviewed and updated as appropriate: allergies, current medications, past family history, past medical history, past social history, past surgical history and problem list.    Review of Systems  as per HPI    Objective:  /80   Pulse 77   Ht 4' 9\" (1.448 m)   Wt 71.7 kg (158 lb)   LMP  (LMP Unknown)   SpO2 98%   BMI 34.19 kg/m²    Physical Exam  Vitals reviewed.   Constitutional:       General: She is not in acute distress.     Appearance: Normal appearance. She is not ill-appearing, toxic-appearing or diaphoretic.   HENT:      Head: Normocephalic and atraumatic.      Right Ear: External ear normal.      Left Ear: External ear normal.      Nose: Nose normal.   Eyes:      General: No scleral icterus.        Right eye: No discharge.         Left eye: No discharge.      Extraocular Movements: Extraocular movements intact.      Conjunctiva/sclera: Conjunctivae normal.   Cardiovascular:      Rate and Rhythm: Normal rate and regular rhythm.      Heart sounds: Normal heart sounds.   Pulmonary:      Effort: Pulmonary effort is normal.      Breath sounds: Normal breath sounds.   Abdominal:      Palpations: Abdomen is soft.   Musculoskeletal:         General: Normal range of motion.      Cervical back: Normal range of motion.      Right lower leg: No edema.      Left lower leg: No edema.   Skin:     General: Skin is warm.      Comments: (+) abrasion of R-hand   Neurological:      General: No focal deficit present.      Mental Status: She is alert and oriented to person, place, and time.   Psychiatric:         Mood and Affect: Mood normal.         Behavior: Behavior normal.         "

## 2024-10-09 ENCOUNTER — ANESTHESIA EVENT (OUTPATIENT)
Dept: PERIOP | Facility: HOSPITAL | Age: 69
DRG: 661 | End: 2024-10-09
Payer: COMMERCIAL

## 2024-10-09 NOTE — PRE-PROCEDURE INSTRUCTIONS
Pre-Surgery Instructions:   Medication Instructions    ARIPiprazole (ABILIFY) 20 MG tablet Take day of surgery.    calcitriol (ROCALTROL) 0.25 mcg capsule Hold day of surgery.    calcium acetate (PHOSLO) capsule Stop taking 7 days prior to surgery.    Calcium-Magnesium-Vitamin D (CALCIUM 500 PO) Stop taking 7 days prior to surgery.    cetirizine (ZyrTEC) 10 mg tablet Uses PRN- OK to take day of surgery    cholecalciferol (VITAMIN D3) 1,000 units tablet Stop taking 7 days prior to surgery.    fluticasone (FLONASE) 50 mcg/act nasal spray Uses PRN- OK to take day of surgery    folic acid (FOLVITE) 1 mg tablet Stop taking 7 days prior to surgery.    Multiple Vitamin (MULTIVITAMIN) tablet Stop taking 7 days prior to surgery.    omeprazole (PriLOSEC) 20 mg delayed release capsule Uses PRN- OK to take day of surgery    oxybutynin (DITROPAN XL) 15 MG 24 hr tablet Hold day of surgery.    senna (SENOKOT) 8.6 MG tablet Hold day of surgery.    sodium bicarbonate 650 mg tablet Hold day of surgery.    venlafaxine (EFFEXOR-XR) 75 mg 24 hr capsule Take day of surgery.   Medication instructions for day surgery reviewed. Please use only a sip of water to take your instructed medications. Avoid all over the counter vitamins, supplements and NSAIDS for one week prior to surgery per anesthesia guidelines. Tylenol is ok to take as needed.     You will receive a call one business day prior to surgery with an arrival time and hospital directions. If your surgery is scheduled on a Monday, the hospital will be calling you on the Friday prior to your surgery. If you have not heard from anyone by 8pm, please call the hospital supervisor through the hospital  at [phone].  or [place] [phone]).    Do not eat or drink anything after midnight the night before your surgery, including candy, mints, lifesavers, or chewing gum. Do not drink alcohol 24hrs before your surgery. Try not to smoke at least 24hrs before your surgery.        Follow the pre surgery showering instructions as listed in the “My Surgical Experience Booklet” or otherwise provided by your surgeon's office. Do not use a blade to shave the surgical area 1 week before surgery. It is okay to use a clean electric clippers up to 24 hours before surgery. Do not apply any lotions, creams, including makeup, cologne, deodorant, or perfumes after showering on the day of your surgery. Do not use dry shampoo, hair spray, hair gel, or any type of hair products.     No contact lenses, eye make-up, or artificial eyelashes. Remove nail polish, including gel polish, and any artificial, gel, or acrylic nails if possible. Remove all jewelry including rings and body piercing jewelry.     Wear causal clothing that is easy to take on and off. Consider your type of surgery.    Keep any valuables, jewelry, piercings at home. Please bring any specially ordered equipment (sling, braces) if indicated.    Arrange for a responsible person to drive you to and from the hospital on the day of your surgery. Please confirm the visitor policy for the day of your procedure when you receive your phone call with an arrival time.     Call the surgeon's office with any new illnesses, exposures, or additional questions prior to surgery.    Please reference your “My Surgical Experience Booklet” for additional information to prepare for your upcoming surgery.

## 2024-10-10 ENCOUNTER — APPOINTMENT (OUTPATIENT)
Dept: PREADMISSION TESTING | Facility: HOSPITAL | Age: 69
DRG: 661 | End: 2024-10-10
Payer: COMMERCIAL

## 2024-10-10 ENCOUNTER — OFFICE VISIT (OUTPATIENT)
Dept: UROLOGY | Facility: CLINIC | Age: 69
End: 2024-10-10
Payer: COMMERCIAL

## 2024-10-10 ENCOUNTER — TELEPHONE (OUTPATIENT)
Age: 69
End: 2024-10-10

## 2024-10-10 VITALS
DIASTOLIC BLOOD PRESSURE: 80 MMHG | HEIGHT: 57 IN | WEIGHT: 159 LBS | HEART RATE: 71 BPM | OXYGEN SATURATION: 98 % | BODY MASS INDEX: 34.3 KG/M2 | SYSTOLIC BLOOD PRESSURE: 136 MMHG

## 2024-10-10 DIAGNOSIS — C54.1 ENDOMETRIAL CANCER (HCC): Primary | Chronic | ICD-10-CM

## 2024-10-10 PROCEDURE — 99215 OFFICE O/P EST HI 40 MIN: CPT | Performed by: UROLOGY

## 2024-10-10 NOTE — ASSESSMENT & PLAN NOTE
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  These have been managed previously with stents without success.  Fortunately per GYN oncology she has no evidence of cancer/disease at this time.  We again discussed and reviewed options to manage her distal ureteral strictures including bilateral ureteral reimplant's versus creation of an ileal conduit.  We discussed that it may be difficulty to perform bilateral reimplants based on prior pelvic radiation and inability to mobilize the bladder unilaterally as both strictures are at the level of the pelvic brim.  We discussed as an alternative ileal conduit urinary diversion which the patient is favoring.  She understands that any attempt to divert her urine would leave her native bladder in place in case she were to ever require a renal transplant if she developed end-stage renal disease.  In current state her creatinine is stable at approximately 2.  Risk of further renal compromise with an ileal conduit.  She understands the risk of the ureteral ileal anastomotic stricture and that the procedure is being performed to remove her nephrostomy tubes, however, if there is stricture of the anastomoses from ischemia she may ultimately require permanent nephrostomy tubes either unilaterally or even bilaterally.  She also understands that she is at increased risk for complications such as inadvertent bowel injury or vascular injury secondary to her prior radiation.  All risks were understood and the patient signed informed consent today.  Surgery is tentatively planned for October 24, 2024 as a joint case with myself and Dr. Rodriguez from gynecological oncology.

## 2024-10-10 NOTE — H&P (VIEW-ONLY)
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  These have been managed previously with stents without success.  Fortunately per GYN oncology she has no evidence of cancer/disease at this time.  We again discussed and reviewed options to manage her distal ureteral strictures including bilateral ureteral reimplant's versus creation of an ileal conduit.  We discussed that it may be difficulty to perform bilateral reimplants based on prior pelvic radiation and inability to mobilize the bladder unilaterally as both strictures are at the level of the pelvic brim.  We discussed as an alternative ileal conduit urinary diversion which the patient is favoring.  She understands that any attempt to divert her urine would leave her native bladder in place in case she were to ever require a renal transplant if she developed end-stage renal disease.  In current state her creatinine is stable at approximately 2.  Risk of further renal compromise with an ileal conduit.  She understands the risk of the ureteral ileal anastomotic stricture and that the procedure is being performed to remove her nephrostomy tubes, however, if there is stricture of the anastomoses from ischemia she may ultimately require permanent nephrostomy tubes either unilaterally or even bilaterally.  She also understands that she is at increased risk for complications such as inadvertent bowel injury or vascular injury secondary to her prior  "radiation.  All risks were understood and the patient signed informed consent today.  Surgery is tentatively planned for October 24, 2024 as a joint case with myself and Dr. Rodriguez from gynecological oncology.    History of Present Illness     Ragini Melendez is a 69 y.o. female who presents in follow-up today prior to planned exploratory laparotomy.  She has a history of endometrial carcinoma treated with ASH/BSO in 2017.  She also received radiation therapy.  She developed bilateral distal ureteral strictures.  She has been managed with bilateral indwelling nephrostomy tubes.  She has failed ureteral stents.  She has clear distal ureteral narrowing/strictures from the radiation.  She presents to the office to discuss proposed upcoming surgery for exploratory laparotomy with creation of an ileal conduit.      Review of Systems          Objective     /80 (BP Location: Left arm, Patient Position: Sitting, Cuff Size: Adult)   Pulse 71   Ht 4' 9\" (1.448 m)   Wt 72.1 kg (159 lb)   LMP  (LMP Unknown)   SpO2 98%   BMI 34.41 kg/m²   Physical Exam  On examination she is in no acute distress.  Her abdomen is soft nontender nondistended.  Surgical scar noted.  Bilateral nephrostomy tubes in place.  Skin is warm.  Extremities without edema.  Gait is slow.  Results  No results found for: \"PSA\"  Lab Results   Component Value Date    GLUCOSE 219 (H) 12/19/2017    CALCIUM 8.8 08/01/2024     10/27/2017    K 4.3 08/01/2024    CO2 23 08/01/2024     08/01/2024    BUN 44 (H) 08/01/2024    CREATININE 1.92 (H) 08/01/2024     Lab Results   Component Value Date    WBC 6.49 04/23/2024    HGB 11.0 (L) 04/23/2024    HCT 36.1 04/23/2024    MCV 96 04/23/2024     04/23/2024       Office Urine Dip  No results found for this or any previous visit (from the past 1 hour(s)).]    Administrative Statements         "

## 2024-10-10 NOTE — TELEPHONE ENCOUNTER
Patient is having an operating on 10/24 and the anaesthesiologist needs a neph clearance.    There are no open slots prior to her surgery date. Patient was just seen in August. She would like to know if she can be cleared from that last visit?    Patient is having labs done on Monday that was ordered by the surgeon who is an Urologist.    Patient would like a call back asap.

## 2024-10-10 NOTE — LETTER
October 15, 2024     Emely Yost DO   [facility] [address]    Patient: Ragini Melendez   YOB: 1955   Date of Visit: 10/10/2024       Dear Dr. Yost:    Thank you for referring Ragini Melendez to me for evaluation. Below are my notes for this consultation.    If you have questions, please do not hesitate to call me. I look forward to following your patient along with you.         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  These have been managed previously with stents without success.  Fortunately per GYN oncology she has no evidence of cancer/disease at this time.  We again discussed and reviewed options to manage her distal ureteral strictures including bilateral ureteral reimplant's versus creation of an ileal conduit.  We discussed that it may be difficulty to perform bilateral reimplants based on prior pelvic radiation and inability to mobilize the bladder unilaterally as both strictures are at the level of the pelvic brim.  We discussed as an alternative ileal conduit urinary diversion which the patient is favoring.  She understands that any attempt to divert her urine would leave her native bladder in place in case she were to ever require a renal transplant if she developed end-stage renal disease.  In  "current state her creatinine is stable at approximately 2.  Risk of further renal compromise with an ileal conduit.  She understands the risk of the ureteral ileal anastomotic stricture and that the procedure is being performed to remove her nephrostomy tubes, however, if there is stricture of the anastomoses from ischemia she may ultimately require permanent nephrostomy tubes either unilaterally or even bilaterally.  She also understands that she is at increased risk for complications such as inadvertent bowel injury or vascular injury secondary to her prior radiation.  All risks were understood and the patient signed informed consent today.  Surgery is tentatively planned for October 24, 2024 as a joint case with myself and Dr. Rodriguez from gynecological oncology.    History of Present Illness     Ragini Melendez is a 69 y.o. female who presents in follow-up today prior to planned exploratory laparotomy.  She has a history of endometrial carcinoma treated with ASH/BSO in 2017.  She also received radiation therapy.  She developed bilateral distal ureteral strictures.  She has been managed with bilateral indwelling nephrostomy tubes.  She has failed ureteral stents.  She has clear distal ureteral narrowing/strictures from the radiation.  She presents to the office to discuss proposed upcoming surgery for exploratory laparotomy with creation of an ileal conduit.      Review of Systems          Objective     /80 (BP Location: Left arm, Patient Position: Sitting, Cuff Size: Adult)   Pulse 71   Ht 4' 9\" (1.448 m)   Wt 72.1 kg (159 lb)   LMP  (LMP Unknown)   SpO2 98%   BMI 34.41 kg/m²   Physical Exam  On examination she is in no acute distress.  Her abdomen is soft nontender nondistended.  Surgical scar noted.  Bilateral nephrostomy tubes in place.  Skin is warm.  Extremities without edema.  Gait is slow.  Results  No results found for: \"PSA\"  Lab Results   Component Value Date    GLUCOSE 219 (H) 12/19/2017    " CALCIUM 8.8 08/01/2024     10/27/2017    K 4.3 08/01/2024    CO2 23 08/01/2024     08/01/2024    BUN 44 (H) 08/01/2024    CREATININE 1.92 (H) 08/01/2024     Lab Results   Component Value Date    WBC 6.49 04/23/2024    HGB 11.0 (L) 04/23/2024    HCT 36.1 04/23/2024    MCV 96 04/23/2024     04/23/2024       Office Urine Dip  No results found for this or any previous visit (from the past 1 hour(s)).]    Administrative Statements

## 2024-10-10 NOTE — PROGRESS NOTES
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  These have been managed previously with stents without success.  Fortunately per GYN oncology she has no evidence of cancer/disease at this time.  We again discussed and reviewed options to manage her distal ureteral strictures including bilateral ureteral reimplant's versus creation of an ileal conduit.  We discussed that it may be difficulty to perform bilateral reimplants based on prior pelvic radiation and inability to mobilize the bladder unilaterally as both strictures are at the level of the pelvic brim.  We discussed as an alternative ileal conduit urinary diversion which the patient is favoring.  She understands that any attempt to divert her urine would leave her native bladder in place in case she were to ever require a renal transplant if she developed end-stage renal disease.  In current state her creatinine is stable at approximately 2.  Risk of further renal compromise with an ileal conduit.  She understands the risk of the ureteral ileal anastomotic stricture and that the procedure is being performed to remove her nephrostomy tubes, however, if there is stricture of the anastomoses from ischemia she may ultimately require permanent nephrostomy tubes either unilaterally or even bilaterally.  She also understands that she is at increased risk for complications such as inadvertent bowel injury or vascular injury secondary to her prior  "radiation.  All risks were understood and the patient signed informed consent today.  Surgery is tentatively planned for October 24, 2024 as a joint case with myself and Dr. Rodriguez from gynecological oncology.    History of Present Illness     Ragini Melendez is a 69 y.o. female who presents in follow-up today prior to planned exploratory laparotomy.  She has a history of endometrial carcinoma treated with ASH/BSO in 2017.  She also received radiation therapy.  She developed bilateral distal ureteral strictures.  She has been managed with bilateral indwelling nephrostomy tubes.  She has failed ureteral stents.  She has clear distal ureteral narrowing/strictures from the radiation.  She presents to the office to discuss proposed upcoming surgery for exploratory laparotomy with creation of an ileal conduit.      Review of Systems          Objective     /80 (BP Location: Left arm, Patient Position: Sitting, Cuff Size: Adult)   Pulse 71   Ht 4' 9\" (1.448 m)   Wt 72.1 kg (159 lb)   LMP  (LMP Unknown)   SpO2 98%   BMI 34.41 kg/m²   Physical Exam  On examination she is in no acute distress.  Her abdomen is soft nontender nondistended.  Surgical scar noted.  Bilateral nephrostomy tubes in place.  Skin is warm.  Extremities without edema.  Gait is slow.  Results  No results found for: \"PSA\"  Lab Results   Component Value Date    GLUCOSE 219 (H) 12/19/2017    CALCIUM 8.8 08/01/2024     10/27/2017    K 4.3 08/01/2024    CO2 23 08/01/2024     08/01/2024    BUN 44 (H) 08/01/2024    CREATININE 1.92 (H) 08/01/2024     Lab Results   Component Value Date    WBC 6.49 04/23/2024    HGB 11.0 (L) 04/23/2024    HCT 36.1 04/23/2024    MCV 96 04/23/2024     04/23/2024       Office Urine Dip  No results found for this or any previous visit (from the past 1 hour(s)).]    Administrative Statements         "

## 2024-10-11 ENCOUNTER — TELEPHONE (OUTPATIENT)
Dept: RADIOLOGY | Facility: HOSPITAL | Age: 69
End: 2024-10-11

## 2024-10-11 ENCOUNTER — TELEPHONE (OUTPATIENT)
Dept: NEPHROLOGY | Facility: CLINIC | Age: 69
End: 2024-10-11

## 2024-10-11 NOTE — TELEPHONE ENCOUNTER
EKTA received referral from [facility] for FTS on 2/22/17 and 3/22/17.  Upon reaching patient in order to schedule, patient stated that she does wish to schedule the FTS, and only interested in the 18-20 week ultrasound (which is scheduled for 4/28/17).  Removing FTS orders.     Mindi Hall       Spoke with patient about the following, she expressed understanding and thanked us for the call:    can clear her after reviewing her labs.  No need for office visit.

## 2024-10-14 ENCOUNTER — HOSPITAL ENCOUNTER (OUTPATIENT)
Dept: INFUSION CENTER | Facility: CLINIC | Age: 69
Discharge: HOME | End: 2024-10-14
Payer: COMMERCIAL

## 2024-10-14 ENCOUNTER — OFFICE VISIT (OUTPATIENT)
Dept: CARDIOLOGY CLINIC | Facility: CLINIC | Age: 69
End: 2024-10-14
Payer: COMMERCIAL

## 2024-10-14 ENCOUNTER — LAB REQUISITION (OUTPATIENT)
Dept: LAB | Facility: HOSPITAL | Age: 69
End: 2024-10-14
Payer: COMMERCIAL

## 2024-10-14 ENCOUNTER — NURSE TRIAGE (OUTPATIENT)
Age: 69
End: 2024-10-14

## 2024-10-14 VITALS
SYSTOLIC BLOOD PRESSURE: 100 MMHG | BODY MASS INDEX: 34.09 KG/M2 | WEIGHT: 158 LBS | HEIGHT: 57 IN | HEART RATE: 66 BPM | DIASTOLIC BLOOD PRESSURE: 60 MMHG | OXYGEN SATURATION: 98 %

## 2024-10-14 DIAGNOSIS — Z01.812 PRE-PROCEDURE LAB EXAM: ICD-10-CM

## 2024-10-14 DIAGNOSIS — R31.0 GROSS HEMATURIA: Primary | ICD-10-CM

## 2024-10-14 DIAGNOSIS — Z01.810 PREOP CARDIOVASCULAR EXAM: ICD-10-CM

## 2024-10-14 DIAGNOSIS — Z79.01 LONG TERM (CURRENT) USE OF ANTICOAGULANTS: ICD-10-CM

## 2024-10-14 DIAGNOSIS — Z01.818 PREOP EXAMINATION: ICD-10-CM

## 2024-10-14 DIAGNOSIS — Z86.718 HISTORY OF DVT (DEEP VEIN THROMBOSIS): ICD-10-CM

## 2024-10-14 DIAGNOSIS — C54.1 ENDOMETRIAL CANCER (HCC): ICD-10-CM

## 2024-10-14 DIAGNOSIS — N18.4 STAGE 4 CHRONIC KIDNEY DISEASE (HCC): ICD-10-CM

## 2024-10-14 DIAGNOSIS — I82.4Y1 ACUTE DEEP VEIN THROMBOSIS (DVT) OF PROXIMAL VEIN OF RIGHT LOWER EXTREMITY (HCC): ICD-10-CM

## 2024-10-14 DIAGNOSIS — D50.8 IRON DEFICIENCY ANEMIA SECONDARY TO INADEQUATE DIETARY IRON INTAKE: ICD-10-CM

## 2024-10-14 DIAGNOSIS — N13.5 URETERAL STRICTURE: ICD-10-CM

## 2024-10-14 DIAGNOSIS — I10 BENIGN ESSENTIAL HYPERTENSION: Primary | ICD-10-CM

## 2024-10-14 DIAGNOSIS — E43 UNSPECIFIED SEVERE PROTEIN-CALORIE MALNUTRITION (HCC): ICD-10-CM

## 2024-10-14 DIAGNOSIS — E83.39 HYPERPHOSPHATEMIA: ICD-10-CM

## 2024-10-14 DIAGNOSIS — Z01.818 ENCOUNTER FOR OTHER PREPROCEDURAL EXAMINATION: ICD-10-CM

## 2024-10-14 DIAGNOSIS — Z45.2 ENCOUNTER FOR CENTRAL LINE CARE: Primary | ICD-10-CM

## 2024-10-14 DIAGNOSIS — R39.89 SUSPECTED UTI: ICD-10-CM

## 2024-10-14 DIAGNOSIS — Z86.39 HISTORY OF METABOLIC ACIDOSIS: ICD-10-CM

## 2024-10-14 DIAGNOSIS — R79.0 ABNORMAL IRON SATURATION: ICD-10-CM

## 2024-10-14 DIAGNOSIS — N25.81 SECONDARY HYPERPARATHYROIDISM (HCC): ICD-10-CM

## 2024-10-14 DIAGNOSIS — D47.2 MGUS (MONOCLONAL GAMMOPATHY OF UNKNOWN SIGNIFICANCE): ICD-10-CM

## 2024-10-14 LAB
ABO GROUP BLD: NORMAL
ALBUMIN SERPL BCG-MCNC: 3.8 G/DL (ref 3.5–5)
ALBUMIN SERPL BCG-MCNC: 3.8 G/DL (ref 3.5–5)
ALP SERPL-CCNC: 102 U/L (ref 34–104)
ALT SERPL W P-5'-P-CCNC: 20 U/L (ref 7–52)
ANION GAP SERPL CALCULATED.3IONS-SCNC: 9 MMOL/L (ref 4–13)
ANION GAP SERPL CALCULATED.3IONS-SCNC: 9 MMOL/L (ref 4–13)
APTT PPP: 30 SECONDS (ref 23–34)
AST SERPL W P-5'-P-CCNC: 19 U/L (ref 13–39)
BASOPHILS # BLD AUTO: 0.06 THOUSANDS/ΜL (ref 0–0.1)
BASOPHILS # BLD AUTO: 0.07 THOUSANDS/ΜL (ref 0–0.1)
BASOPHILS NFR BLD AUTO: 1 % (ref 0–1)
BASOPHILS NFR BLD AUTO: 1 % (ref 0–1)
BILIRUB SERPL-MCNC: 0.32 MG/DL (ref 0.2–1)
BLD GP AB SCN SERPL QL: NEGATIVE
BUN SERPL-MCNC: 50 MG/DL (ref 5–25)
BUN SERPL-MCNC: 51 MG/DL (ref 5–25)
CALCIUM SERPL-MCNC: 8.7 MG/DL (ref 8.4–10.2)
CALCIUM SERPL-MCNC: 8.7 MG/DL (ref 8.4–10.2)
CHLORIDE SERPL-SCNC: 104 MMOL/L (ref 96–108)
CHLORIDE SERPL-SCNC: 104 MMOL/L (ref 96–108)
CO2 SERPL-SCNC: 23 MMOL/L (ref 21–32)
CO2 SERPL-SCNC: 23 MMOL/L (ref 21–32)
CREAT SERPL-MCNC: 1.99 MG/DL (ref 0.6–1.3)
CREAT SERPL-MCNC: 2 MG/DL (ref 0.6–1.3)
EOSINOPHIL # BLD AUTO: 0.02 THOUSAND/ΜL (ref 0–0.61)
EOSINOPHIL # BLD AUTO: 0.03 THOUSAND/ΜL (ref 0–0.61)
EOSINOPHIL NFR BLD AUTO: 0 % (ref 0–6)
EOSINOPHIL NFR BLD AUTO: 0 % (ref 0–6)
ERYTHROCYTE [DISTWIDTH] IN BLOOD BY AUTOMATED COUNT: 12.7 % (ref 11.6–15.1)
ERYTHROCYTE [DISTWIDTH] IN BLOOD BY AUTOMATED COUNT: 12.7 % (ref 11.6–15.1)
FERRITIN SERPL-MCNC: 543 NG/ML (ref 11–307)
GFR SERPL CREATININE-BSD FRML MDRD: 25 ML/MIN/1.73SQ M
GFR SERPL CREATININE-BSD FRML MDRD: 25 ML/MIN/1.73SQ M
GLUCOSE SERPL-MCNC: 91 MG/DL (ref 65–140)
GLUCOSE SERPL-MCNC: 91 MG/DL (ref 65–140)
HCT VFR BLD AUTO: 34.7 % (ref 34.8–46.1)
HCT VFR BLD AUTO: 35.3 % (ref 34.8–46.1)
HGB BLD-MCNC: 10.9 G/DL (ref 11.5–15.4)
HGB BLD-MCNC: 11 G/DL (ref 11.5–15.4)
IGA SERPL-MCNC: 325 MG/DL (ref 66–433)
IGG SERPL-MCNC: 1607 MG/DL (ref 635–1741)
IGM SERPL-MCNC: 99 MG/DL (ref 45–281)
IMM GRANULOCYTES # BLD AUTO: 0.13 THOUSAND/UL (ref 0–0.2)
IMM GRANULOCYTES # BLD AUTO: 0.15 THOUSAND/UL (ref 0–0.2)
IMM GRANULOCYTES NFR BLD AUTO: 2 % (ref 0–2)
IMM GRANULOCYTES NFR BLD AUTO: 2 % (ref 0–2)
INR PPP: 0.93 (ref 0.85–1.19)
IRON SATN MFR SERPL: 29 % (ref 15–50)
IRON SERPL-MCNC: 72 UG/DL (ref 50–212)
LYMPHOCYTES # BLD AUTO: 0.96 THOUSANDS/ΜL (ref 0.6–4.47)
LYMPHOCYTES # BLD AUTO: 1.06 THOUSANDS/ΜL (ref 0.6–4.47)
LYMPHOCYTES NFR BLD AUTO: 11 % (ref 14–44)
LYMPHOCYTES NFR BLD AUTO: 12 % (ref 14–44)
MCH RBC QN AUTO: 29.1 PG (ref 26.8–34.3)
MCH RBC QN AUTO: 29.2 PG (ref 26.8–34.3)
MCHC RBC AUTO-ENTMCNC: 31.2 G/DL (ref 31.4–37.4)
MCHC RBC AUTO-ENTMCNC: 31.4 G/DL (ref 31.4–37.4)
MCV RBC AUTO: 93 FL (ref 82–98)
MCV RBC AUTO: 94 FL (ref 82–98)
MONOCYTES # BLD AUTO: 0.8 THOUSAND/ΜL (ref 0.17–1.22)
MONOCYTES # BLD AUTO: 0.81 THOUSAND/ΜL (ref 0.17–1.22)
MONOCYTES NFR BLD AUTO: 10 % (ref 4–12)
MONOCYTES NFR BLD AUTO: 9 % (ref 4–12)
NEUTROPHILS # BLD AUTO: 6.47 THOUSANDS/ΜL (ref 1.85–7.62)
NEUTROPHILS # BLD AUTO: 6.58 THOUSANDS/ΜL (ref 1.85–7.62)
NEUTS SEG NFR BLD AUTO: 76 % (ref 43–75)
NEUTS SEG NFR BLD AUTO: 76 % (ref 43–75)
NRBC BLD AUTO-RTO: 0 /100 WBCS
NRBC BLD AUTO-RTO: 0 /100 WBCS
PHOSPHATE SERPL-MCNC: 4.3 MG/DL (ref 2.3–4.1)
PLATELET # BLD AUTO: 262 THOUSANDS/UL (ref 149–390)
PLATELET # BLD AUTO: 266 THOUSANDS/UL (ref 149–390)
PMV BLD AUTO: 9.3 FL (ref 8.9–12.7)
PMV BLD AUTO: 9.3 FL (ref 8.9–12.7)
POTASSIUM SERPL-SCNC: 4.1 MMOL/L (ref 3.5–5.3)
POTASSIUM SERPL-SCNC: 4.1 MMOL/L (ref 3.5–5.3)
PROT SERPL-MCNC: 7.7 G/DL (ref 6.4–8.4)
PROTHROMBIN TIME: 13.2 SECONDS (ref 12.3–15)
PTH-INTACT SERPL-MCNC: 344.6 PG/ML (ref 12–88)
RBC # BLD AUTO: 3.74 MILLION/UL (ref 3.81–5.12)
RBC # BLD AUTO: 3.77 MILLION/UL (ref 3.81–5.12)
RH BLD: POSITIVE
SODIUM SERPL-SCNC: 136 MMOL/L (ref 135–147)
SODIUM SERPL-SCNC: 136 MMOL/L (ref 135–147)
SPECIMEN EXPIRATION DATE: NORMAL
TIBC SERPL-MCNC: 252 UG/DL (ref 250–450)
UIBC SERPL-MCNC: 180 UG/DL (ref 155–355)
VIT B12 SERPL-MCNC: 785 PG/ML (ref 180–914)
WBC # BLD AUTO: 8.48 THOUSAND/UL (ref 4.31–10.16)
WBC # BLD AUTO: 8.66 THOUSAND/UL (ref 4.31–10.16)

## 2024-10-14 PROCEDURE — 87186 SC STD MICRODIL/AGAR DIL: CPT | Performed by: UROLOGY

## 2024-10-14 PROCEDURE — 80053 COMPREHEN METABOLIC PANEL: CPT

## 2024-10-14 PROCEDURE — 83550 IRON BINDING TEST: CPT

## 2024-10-14 PROCEDURE — 83521 IG LIGHT CHAINS FREE EACH: CPT | Performed by: PHYSICIAN ASSISTANT

## 2024-10-14 PROCEDURE — 85025 COMPLETE CBC W/AUTO DIFF WBC: CPT

## 2024-10-14 PROCEDURE — 86901 BLOOD TYPING SEROLOGIC RH(D): CPT | Performed by: UROLOGY

## 2024-10-14 PROCEDURE — 80069 RENAL FUNCTION PANEL: CPT

## 2024-10-14 PROCEDURE — 86900 BLOOD TYPING SEROLOGIC ABO: CPT | Performed by: UROLOGY

## 2024-10-14 PROCEDURE — 83540 ASSAY OF IRON: CPT

## 2024-10-14 PROCEDURE — 82607 VITAMIN B-12: CPT

## 2024-10-14 PROCEDURE — 82784 ASSAY IGA/IGD/IGG/IGM EACH: CPT

## 2024-10-14 PROCEDURE — 85730 THROMBOPLASTIN TIME PARTIAL: CPT | Performed by: UROLOGY

## 2024-10-14 PROCEDURE — 99204 OFFICE O/P NEW MOD 45 MIN: CPT | Performed by: INTERNAL MEDICINE

## 2024-10-14 PROCEDURE — 83970 ASSAY OF PARATHORMONE: CPT

## 2024-10-14 PROCEDURE — 82728 ASSAY OF FERRITIN: CPT

## 2024-10-14 PROCEDURE — 84165 PROTEIN E-PHORESIS SERUM: CPT

## 2024-10-14 PROCEDURE — 85025 COMPLETE CBC W/AUTO DIFF WBC: CPT | Performed by: UROLOGY

## 2024-10-14 PROCEDURE — 87077 CULTURE AEROBIC IDENTIFY: CPT | Performed by: UROLOGY

## 2024-10-14 PROCEDURE — 93000 ELECTROCARDIOGRAM COMPLETE: CPT | Performed by: INTERNAL MEDICINE

## 2024-10-14 PROCEDURE — 87086 URINE CULTURE/COLONY COUNT: CPT | Performed by: UROLOGY

## 2024-10-14 PROCEDURE — 86850 RBC ANTIBODY SCREEN: CPT | Performed by: UROLOGY

## 2024-10-14 PROCEDURE — 85610 PROTHROMBIN TIME: CPT | Performed by: UROLOGY

## 2024-10-14 NOTE — PROGRESS NOTES
Cardiology   Ragini Melendez 69 y.o. female MRN: [id number]        Impression:  Hypertension - now relatively hypotensive.  Off medications.  Ileal conduit - patient at acceptable cardiac standpoint to proceed with surgery.    Recommendations:  Proceed with surgery.  Follow up on an as needed basis.     HPI: Ragini Melendez is a 69 y.o. year old female with hypertension who presents for pre-operative risk stratification prior to surgery - ileal conduit. Has endometrial CA with 2 nephrostomy tubes.    Echo 2022 - EF 60% with no significant valvular abnormalities.  Asymptomatic from a cardiac standpoint.  No chest pain, shortness of breath, or palpitations.  Able to climb stairs.           Review of Systems   Constitutional: Negative.    HENT: Negative.     Eyes: Negative.    Respiratory:  Negative for chest tightness and shortness of breath.    Cardiovascular:  Negative for chest pain, palpitations and leg swelling.   Gastrointestinal: Negative.    Endocrine: Negative.    Genitourinary: Negative.    Musculoskeletal: Negative.    Skin: Negative.    Allergic/Immunologic: Negative.    Neurological: Negative.    Hematological: Negative.    Psychiatric/Behavioral: Negative.     All other systems reviewed and are negative.        Past Medical History:   Diagnosis Date    Anemia     Anxiety     Cancer (HCC)     ENDOMETRIAL    Chemotherapy induced neutropenia (HCC) 08/30/2019    Chronic kidney disease     CKD (chronic kidney disease) stage 3, GFR 30-59 ml/min (HCC)     COVID     Fall 2022    Depression     DVT (deep venous thrombosis) (HCC) 07/19/2019    RIGHT LEG    Endometrial cancer (HCC) 12/2017    GERD (gastroesophageal reflux disease)     H/O gastric bypass     Hard to intubate     pt denies AS OF 7/25/23    History of chemotherapy     started 12/2021endometrial cancer- done 12/23/22    History of DVT in adulthood     RLE    History of transfusion 04/13/2023    Hyperglycemia     vx type 2 dm -- last assessed 4/1/14;  resolved 11/7/17    Hyperlipidemia     Hypertension     in past- no meds at present    Iron deficiency anemia     iron infusions weekly    Iron deficiency anemia secondary to inadequate dietary iron intake 02/08/2023    OAB (overactive bladder)     Port-A-Cath in place     Wears glasses      Past Surgical History:   Procedure Laterality Date    ABDOMINAL SURGERY      GASTRIC BYPASS; 2001    BREAST BIOPSY Right 06/28/2019    core biopsy; benign    CHOLECYSTECTOMY      at the time of gastric bypass    COLONOSCOPY      CT NEEDLE BIOPSY LYMPH NODE  07/08/2019    FL GUIDED CENTRAL VENOUS ACCESS DEVICE INSERTION  11/12/2019    FL RETROGRADE PYELOGRAM  03/30/2023    FL RETROGRADE PYELOGRAM  05/09/2023    FL RETROGRADE PYELOGRAM  8/1/2023    GASTRIC BYPASS      HYSTERECTOMY Bilateral 2017    total abdominal with salpingo-oophorectomy    IR NEPHROSTOMY TO NEPHROURETERAL STENT  06/09/2022    IR NEPHROSTOMY TO NEPHROURETERAL STENT  12/20/2022    IR NEPHROSTOMY TO NEPHROURETERAL STENT  8/8/2023    IR NEPHROSTOMY TUBE CHECK/CHANGE/REPOSITION/REINSERTION/UPSIZE  05/27/2022    IR NEPHROSTOMY TUBE CHECK/CHANGE/REPOSITION/REINSERTION/UPSIZE  11/10/2023    IR NEPHROSTOMY TUBE CHECK/CHANGE/REPOSITION/REINSERTION/UPSIZE  2/9/2024    IR NEPHROSTOMY TUBE CHECK/CHANGE/REPOSITION/REINSERTION/UPSIZE  4/12/2024    IR NEPHROSTOMY TUBE CHECK/CHANGE/REPOSITION/REINSERTION/UPSIZE  7/12/2024    IR NEPHROSTOMY TUBE PLACEMENT  07/26/2019    IR NEPHROSTOMY TUBE PLACEMENT  05/27/2023    IR NEPHROURETERAL STENT CHECK/CHANGE/REPOSITION  04/06/2021    IR NEPHROURETERAL STENT CHECK/CHANGE/REPOSITION  06/04/2021    IR NEPHROURETERAL STENT CHECK/CHANGE/REPOSITION  09/03/2021    IR NEPHROURETERAL STENT CHECK/CHANGE/REPOSITION  12/07/2021    IR NEPHROURETERAL STENT CHECK/CHANGE/REPOSITION  03/08/2022    IR NEPHROURETERAL STENT CHECK/CHANGE/REPOSITION  05/10/2022    IR NEPHROURETERAL STENT CHECK/CHANGE/REPOSITION  09/19/2022    IR PICC LINE  09/27/2019    IR  PORT PLACEMENT  07/26/2019    IR PORT PLACEMENT      IR PORT REMOVAL  09/20/2019    OOPHORECTOMY Bilateral 2017    MT CYSTO BLADDER W/URETERAL CATHETERIZATION Right 03/30/2023    Procedure: CYSTOSCOPY RETROGRADE PYELOGRAM WITH exchange of STENT URETERAL;  Surgeon: Demetrius Lowe MD;  [facility];  Service: Urology    MT CYSTO BLADDER W/URETERAL CATHETERIZATION Bilateral 05/09/2023    Procedure: CYSTOSCOPY, BILATERAL RETROGRADE PYELOGRAM, WITH LEFT INSERTION STENT URETERAL, RIGHT URTERAL STENT EXCHANGE;  Surgeon: Nicola Hannah MD;  [facility];  Service: Urology    MT CYSTO BLADDER W/URETERAL CATHETERIZATION Bilateral 8/1/2023    Procedure: CYSTOSCOPY BILATERAL RETROGRADE  WITH REMOVAL BILATERAL  STENT URETERAL;  Surgeon: Demetrius Lowe MD;  [facility];  Service: Urology    MT CYSTO W/INSERT URETERAL STENT Right 03/30/2023    Procedure: EXCHANGE STENT URETERAL;  Surgeon: Demetrius Lowe MD;  [facility];  Service: Urology    MT INSJ TUNNELED CTR VAD W/SUBQ PORT AGE 7 YR/> Left 11/12/2019    Procedure: INSERTION VENOUS PORT ( PORT-A-CATH) IR;  Surgeon: Edilberto Guzman DO;  [facility];  Service: Interventional Radiology    MT LAPS ABD PRTM&OMENTUM DX W/WO SPEC BR/WA SPX N/A 12/19/2017    Procedure: LAPAROSCOPY DIAGNOSTIC;  Surgeon: Evgeny So MD;  [facility];  Service: Gynecology Oncology    MT LAPS W/RAD HYST W/BILAT LMPHADEC RMVL TUBE/OVARY N/A 12/19/2017    Procedure: HYSTERECTOMY LAPAROSCOPIC TOTAL (LTH) W/ ROBOTICS; BILATERAL SALPINGOOPHERECTOMY; LYMPH NODE DISSECTION; lysis of adhesions;  Surgeon: Evgeny So MD;  [facility];  Service: Gynecology Oncology    REMOVAL URETERAL STENT Bilateral 8/1/2023    Procedure: REMOVAL STENT URETERAL;  Surgeon: Demetrius Lowe MD;  [facility];  Service: Urology    TONSILLECTOMY      US GUIDED BREAST BIOPSY RIGHT COMPLETE Right 06/28/2019     Social  History     Substance and Sexual Activity   Alcohol Use Never     Social History     Substance and Sexual Activity   Drug Use Never     Social History     Tobacco Use   Smoking Status Never   Smokeless Tobacco Never     Family History   Problem Relation Age of Onset    Hyperlipidemia Mother     Heart disease Mother     Ovarian cancer Mother 50    Colon cancer Mother     Lymphoma Father     Breast cancer Sister 63    No Known Problems Brother     No Known Problems Brother     No Known Problems Maternal Grandmother     Bone cancer Maternal Grandfather     Uterine cancer Paternal Grandmother     No Known Problems Paternal Grandfather     No Known Problems Maternal Aunt     No Known Problems Maternal Aunt     No Known Problems Maternal Aunt     No Known Problems Maternal Aunt     No Known Problems Paternal Aunt     No Known Problems Paternal Aunt     No Known Problems Paternal Aunt     No Known Problems Paternal Aunt        Allergies:  Allergies   Allergen Reactions    Cephalosporins Rash     Which Cephalosporin reaction was to not specified; however, has tolerated Amoxicillin, Cefazolin, and Cefepime       Medications:     Current Outpatient Medications:     ARIPiprazole (ABILIFY) 20 MG tablet, Take 20 mg by mouth every morning, Disp: , Rfl:     BD PosiFlush 0.9 % SOLN, , Disp: , Rfl:     calcitriol (ROCALTROL) 0.25 mcg capsule, Take 1 capsule (0.25 mcg total) by mouth 3 (three) times a week, Disp: 12 capsule, Rfl: 3    calcium acetate (PHOSLO) capsule, Take 1 capsule (667 mg total) by mouth 3 (three) times a day with meals, Disp: 180 capsule, Rfl: 3    Calcium-Magnesium-Vitamin D (CALCIUM 500 PO), Take 1 capsule by mouth daily at bedtime, Disp: , Rfl:     cetirizine (ZyrTEC) 10 mg tablet, Take 1 tablet (10 mg total) by mouth daily, Disp: 90 tablet, Rfl: 1    cholecalciferol (VITAMIN D3) 1,000 units tablet, Take 4 tablets (4,000 Units total) by mouth daily (Patient taking differently: Take 4,000 Units by mouth daily at  bedtime), Disp: 90 tablet, Rfl: 0    Cyanocobalamin (VITAMIN B12 PO), Take 1 capsule by mouth daily after lunch, Disp: , Rfl:     fluticasone (FLONASE) 50 mcg/act nasal spray, 1 spray into each nostril daily, Disp: 15.8 mL, Rfl: 0    folic acid (FOLVITE) 1 mg tablet, Take 1 tablet (1 mg total) by mouth daily (Patient taking differently: Take 1 mg by mouth daily at bedtime), Disp: , Rfl: 0    furosemide (LASIX) 20 mg tablet, TAKE 1 TABLET (20 MG TOTAL) BY MOUTH IF NEEDED (FOR INCREASED LEG SWELLING OR WEIGHT GAIN >2 LBS IN 1 DAY), Disp: 90 tablet, Rfl: 1    Multiple Vitamin (MULTIVITAMIN) tablet, Take 1 tablet by mouth every morning, Disp: , Rfl:     omeprazole (PriLOSEC) 20 mg delayed release capsule, Take 20 mg by mouth as needed, Disp: , Rfl:     oxybutynin (DITROPAN XL) 15 MG 24 hr tablet, Take 1 tablet (15 mg total) by mouth daily at bedtime, Disp: 90 tablet, Rfl: 1    senna (SENOKOT) 8.6 MG tablet, Take 1 tablet (8.6 mg total) by mouth 2 (two) times a day as needed for constipation, Disp: 60 tablet, Rfl: 0    sodium bicarbonate 650 mg tablet, TAKE 1 TABLET BY MOUTH THREE TIMES DAILY, Disp: 270 tablet, Rfl: 1    sodium chloride, PF, 0.9 %, 10 mL by Intracatheter route daily for 120 doses Intracatheter flushing daily. May substitute prefilled syringe with normal saline 10 mL vials, 10 mL syringes, and 18 g blunt needles, Disp: 300 mL, Rfl: 3    sodium chloride, PF, 0.9 %, 10 mL by Intracatheter route daily for 90 doses, Disp: 300 mL, Rfl: 2    sodium chloride, PF, 0.9 %, 10 mL by Intracatheter route daily Intracatheter flushing daily. May substitute prefilled syringe with normal saline 10 mL vials, 10 mL syringes, and 18 g blunt needles, Disp: 300 mL, Rfl: 0    venlafaxine (EFFEXOR-XR) 75 mg 24 hr capsule, TAKE 3 CAPS DAILY, Disp: , Rfl:     Vibegron (Gemtesa) 75 MG TABS, Take 1 capsule by mouth every morning (Patient not taking: Reported on 10/9/2024), Disp: 90 tablet, Rfl: 3  No current facility-administered  medications for this visit.    Facility-Administered Medications Ordered in Other Visits:     alteplase (CATHFLO) injection 2 mg, 2 mg, Intracatheter, Q1MIN PRN, Beth Rodriguez MD      Wt Readings from Last 3 Encounters:   10/14/24 71.7 kg (158 lb)   10/10/24 72.1 kg (159 lb)   10/03/24 71.7 kg (158 lb)     Temp Readings from Last 3 Encounters:   08/23/24 (!) 97.4 °F (36.3 °C) (Temporal)   07/12/24 (!) 97.1 °F (36.2 °C) (Temporal)   04/12/24 97.8 °F (36.6 °C) (Oral)     BP Readings from Last 3 Encounters:   10/14/24 100/60   10/10/24 136/80   10/03/24 110/80     Pulse Readings from Last 3 Encounters:   10/14/24 66   10/10/24 71   10/03/24 77         Physical Exam  HENT:      Head: Atraumatic.      Mouth/Throat:      Mouth: Mucous membranes are moist.   Eyes:      Extraocular Movements: Extraocular movements intact.   Cardiovascular:      Rate and Rhythm: Normal rate and regular rhythm.      Heart sounds: Normal heart sounds.   Pulmonary:      Effort: Pulmonary effort is normal.      Breath sounds: Normal breath sounds.   Abdominal:      General: Abdomen is flat.   Musculoskeletal:         General: Normal range of motion.      Cervical back: Normal range of motion.   Skin:     General: Skin is warm.   Neurological:      General: No focal deficit present.      Mental Status: She is alert and oriented to person, place, and time.   Psychiatric:         Mood and Affect: Mood normal.         Behavior: Behavior normal.           Laboratory Studies:  CMP:  Lab Results   Component Value Date     10/27/2017    K 4.1 10/14/2024    K 4.1 10/14/2024     10/14/2024     10/14/2024    CO2 23 10/14/2024    CO2 23 10/14/2024    BUN 51 (H) 10/14/2024    BUN 50 (H) 10/14/2024    CREATININE 2.00 (H) 10/14/2024    CREATININE 1.99 (H) 10/14/2024    GLUCOSE 219 (H) 12/19/2017    AST 19 10/14/2024    ALT 20 10/14/2024    BILITOT 0.3 10/27/2017    EGFR 25 10/14/2024    EGFR 25 10/14/2024       Lipid Profile:   Lab Results    Component Value Date    CHOL 183 10/27/2017     Lab Results   Component Value Date    HDL 64 01/28/2023     Lab Results   Component Value Date    LDLCALC 104 (H) 01/28/2023     Lab Results   Component Value Date    TRIG 82 01/28/2023       Cardiac testing:   EKG reviewed personally: MADISON MORALES

## 2024-10-14 NOTE — TELEPHONE ENCOUNTER
Pt calling concerned she has uti she's having blood in urine and feeling very tired,she has upcoming procedure scheduled with ,warm transfer to clinical.

## 2024-10-14 NOTE — PROGRESS NOTES
Pt here for central labs. Offers no new complaints.  Labs drawn and sent to the lab. Urine specimen collected from nephrostomy bag. No future infusion appointments at this time. AVS declined.

## 2024-10-14 NOTE — TELEPHONE ENCOUNTER
Patient of Dr. Lowe last seen in the [place] office called in with concerns of hematuria that started two days ago. Patient states it is intermittent, without clots, and bright red in color. Denies any fevers, vomiting or urinary symptoms. Patient is not on any blood thinners. Also reports having fatigue. Advised to increase water intake and monitor for worsening symptoms. ED precautions reviewed. Placed urine orders to rule out infection. Please advise on any further recommendations.       Reason for Disposition   The patient has gross hematuria without clots, new onset, no urine testing    Answer Assessment - Initial Assessment Questions  1. When did you start with blood in your urine, and can you describe things in detail? Do you have any blood clots, is the hematuria continuous or intermittent and can you describe the color of the blood in your urine in relation to a beverage?   Two days ago, intermittent, no clots, bright red in color   2. Do you have lower back, flank, or lower abdominal/bladder pain?   No  3. Are you experiencing urinary frequency, urgency, pain or burning or any other changes in your urination like being unable to urinate?   No  4. Do you take any blood thinners?   No    Protocols used: Urology-Gross Hemaruria-ADULT-[place]

## 2024-10-15 ENCOUNTER — DOCUMENTATION (OUTPATIENT)
Dept: NEPHROLOGY | Facility: CLINIC | Age: 69
End: 2024-10-15

## 2024-10-15 LAB
KAPPA LC FREE SER-MCNC: 108 MG/L (ref 3.3–19.4)
KAPPA LC FREE/LAMBDA FREE SER: 1.35 {RATIO} (ref 0.26–1.65)
LAMBDA LC FREE SERPL-MCNC: 79.9 MG/L (ref 5.7–26.3)

## 2024-10-15 NOTE — PROGRESS NOTES
Nephrology clearance before upcoming surgery:    Chronic Kidney Disease Stage IV  - Etiology/risk factors: Hypertension, obstructive uropathy in setting of bilateral ureteral strictures, underlying glomerular pathology, age-related nephron loss   - Baseline Cr: 0.9-1.1 until 2022, then had several episodes of MONROE due to obstructive uropathy in setting of bilateral distal ureteral strictures and currently in the range of 2.0-2.5, most recently 2.0 10/14/2024.  She is currently scheduled for ilial conduit for urinary diversion due to bilateral ureteral strictures.    I have personally discussed the risks and benefits of the surgery from a renal standpoint with the patient in depth, and advised the patient about the risk of MONROE and the course of MONROE if it occurs with the small probability of the need for renal replacement therapy in the worse case scenario. Patient voiced understanding.    From a renal standpoint the patient is renally optimized for surgery with the following recommendations:  Fluids to administer: Isolyte 500 cc total over 2 hours   Medication Recommendations:  Minimize any IV contrast use (If IV contrast is used, please check BMP POD # 1)  Hold NSAIDs for at least 10 days prior to surgery  She does not take ACE inhibitor or ARB or diuretics  Hemodynamic Recommendations:  Ideally, target MAPs greater than 65 mmHg in the perioperative period, and minimize operative time with MAPs < 65 mmHg.   Avoid intraop hemodynamic instability to decrease risk for MONROE occurrence.  Other Recommendations:  Please consult the Nephrology team when the patient is admitted

## 2024-10-16 ENCOUNTER — HOSPITAL ENCOUNTER (OUTPATIENT)
Dept: RADIOLOGY | Facility: HOSPITAL | Age: 69
Discharge: HOME | End: 2024-10-16
Attending: STUDENT IN AN ORGANIZED HEALTH CARE EDUCATION/TRAINING PROGRAM | Admitting: RADIOLOGY
Payer: COMMERCIAL

## 2024-10-16 VITALS
TEMPERATURE: 97.3 F | OXYGEN SATURATION: 98 % | DIASTOLIC BLOOD PRESSURE: 61 MMHG | HEIGHT: 57 IN | RESPIRATION RATE: 18 BRPM | SYSTOLIC BLOOD PRESSURE: 123 MMHG | WEIGHT: 158 LBS | BODY MASS INDEX: 34.09 KG/M2 | HEART RATE: 65 BPM

## 2024-10-16 DIAGNOSIS — N13.5 URETERAL STRICTURE: ICD-10-CM

## 2024-10-16 LAB
BACTERIA UR CULT: ABNORMAL
BACTERIA UR CULT: ABNORMAL

## 2024-10-16 PROCEDURE — 99152 MOD SED SAME PHYS/QHP 5/>YRS: CPT | Performed by: RADIOLOGY

## 2024-10-16 PROCEDURE — 50435 EXCHANGE NEPHROSTOMY CATH: CPT | Performed by: RADIOLOGY

## 2024-10-16 PROCEDURE — 50435 EXCHANGE NEPHROSTOMY CATH: CPT

## 2024-10-16 PROCEDURE — 99153 MOD SED SAME PHYS/QHP EA: CPT

## 2024-10-16 PROCEDURE — C1729 CATH, DRAINAGE: HCPCS

## 2024-10-16 PROCEDURE — C1769 GUIDE WIRE: HCPCS

## 2024-10-16 PROCEDURE — 99152 MOD SED SAME PHYS/QHP 5/>YRS: CPT

## 2024-10-16 RX ORDER — FENTANYL CITRATE 50 UG/ML
INJECTION, SOLUTION INTRAMUSCULAR; INTRAVENOUS AS NEEDED
Status: DISCONTINUED | OUTPATIENT
Start: 2024-10-16 | End: 2024-10-17 | Stop reason: HOSPADM

## 2024-10-16 RX ORDER — LIDOCAINE WITH 8.4% SOD BICARB 0.9%(10ML)
SYRINGE (ML) INJECTION AS NEEDED
Status: DISCONTINUED | OUTPATIENT
Start: 2024-10-16 | End: 2024-10-17 | Stop reason: HOSPADM

## 2024-10-16 RX ORDER — MIDAZOLAM HYDROCHLORIDE 2 MG/2ML
INJECTION, SOLUTION INTRAMUSCULAR; INTRAVENOUS AS NEEDED
Status: DISCONTINUED | OUTPATIENT
Start: 2024-10-16 | End: 2024-10-17 | Stop reason: HOSPADM

## 2024-10-16 RX ADMIN — FENTANYL CITRATE 25 MCG: 50 INJECTION INTRAMUSCULAR; INTRAVENOUS at 11:06

## 2024-10-16 RX ADMIN — IOHEXOL 15 ML: 350 INJECTION, SOLUTION INTRAVENOUS at 11:27

## 2024-10-16 RX ADMIN — Medication 10 ML: at 11:10

## 2024-10-16 RX ADMIN — FENTANYL CITRATE 25 MCG: 50 INJECTION INTRAMUSCULAR; INTRAVENOUS at 11:20

## 2024-10-16 RX ADMIN — MIDAZOLAM 1 MG: 1 INJECTION INTRAMUSCULAR; INTRAVENOUS at 11:06

## 2024-10-16 RX ADMIN — MIDAZOLAM 0.5 MG: 1 INJECTION INTRAMUSCULAR; INTRAVENOUS at 11:20

## 2024-10-16 NOTE — SEDATION DOCUMENTATION
Procedure completed by Dr Jennings. Pt tolerated without issues, VSS. Education provided to pt prior to and throughout procedure, questions answered as offered. Tube sutured, dry dressing to site. Transported back to GI Pre for recovery, bedside report given to RN.

## 2024-10-16 NOTE — DISCHARGE INSTRUCTIONS
Nephrostomy Tube Care     WHAT YOU NEED TO KNOW:   A nephrostomy tube is a catheter (thin plastic tube) that is inserted through your skin and into your kidney. The nephrostomy tube drains urine from your kidney into a collecting bag outside your body. You may need a nephrostomy tube when something is blocking the normal flow of urine. A nephrostomy tube may be used for a short or a long period of time. The nephrostomy tube comes out of your back, so you will need someone to help care for your nephrostomy tube.          DISCHARGE INSTRUCTIONS:      How to clean the skin around the nephrostomy tube and change the bandage:  Since the nephrostomy tube comes out of your back, you will not be able to care for it by yourself. Ask someone to follow the general directions below to check and care for your nephrostomy tube.   Gather the items you will need.          Disposable (single use) under-pad, and a clean washcloth  Plain soap, warm water, and new medical gloves  Sterile gauze bandages  Clear adhesive dressing or medical tape  Skin barrier  Protective skin film  Trash bag  Remove the old bandage, and check the tube entry site.    Have the patient lie on his side with the nephrostomy tube entry site facing up. Place the under-pad where it will catch drainage as you are working with the nephrostomy tube.   Wash your hands with soap and water. Put on new medical gloves.  Gently remove the old bandage, without pulling on the tube. Do this by holding the skin beside the tube with one hand. With the other hand, gently remove sticky tape and the skin barrier by pulling in the same direction as hair growth. Do not touch the side of the bandage that is placed over or around the tube. Throw the bandage and skin barrier away in a trash bag.  Look for signs of infection, such as skin redness and swelling. Report any skin changes to healthcare providers.  Clean the tube entry site.    Hold the tube in place to keep it from  being pulled out while you are cleaning around it.  You will need to clean the area twice. For the first cleaning, wet a new gauze bandage with soap and water.  Begin at the entry site of the tube. Wipe the skin in circles, moving away from the entry site. Remove blood and any other material with the gauze. Do this as often as needed. Use a new gauze bandage each time you clean the area, moving away from the entry site.   For the second cleaning, wet a new gauze bandage with water. Begin at the entry site of the tube. Wipe the skin in circles, moving away from the entry site. Use a new gauze bandage each time you clean the area, moving away from the entry site.   Gently pat the skin with a clean washcloth to dry it.    Apply the skin barrier and bandages.    Roll up a bandage to make it thick, and place it under  the place where the tube enters the skin. Place it to support the tube, and stop it from kinking or bending. Tape the bandage in place, and apply more bandages if directed by a healthcare provider.   Bring the tubing forward to the front and tape it to the skin. Do not stretch the tube tight, because this may pull the nephrostomy tube out.  How often to change the bandage.  Change the bandage around the tube, every other day. If your bandages  get dirty or wet, change them right away, and as often as needed. If your nephrostomy tube is to be used for a long period of time, the tube needs to be changed every 2 to 3 months. Healthcare providers will tell you when you need to make an appointment to have your tube changed.     How to care for the urine drainage bag:   Ask if you need to measure and write down how much urine is in the bag before you empty it. Drain urine out of the drainage bag when it is ½ to ? full. Open the spout at the bottom of the bag to empty the urine into the toilet.   You may need to detach the drainage bag from the nephrostomy tube to change it.. If so, attach a new drainage bag  tightly to the nephrostomy tube.     How to prevent problems with your nephrostomy tube:   Change bandages, directed.  This helps to prevent infection. Throw away or clean your drainage bag as directed by your healthcare provider.    Wipe the connecting ends of the drainage bag with alcohol before you reconnect the bag to the tube.  This helps prevent infection.     Keep the tube taped to your skin and connected to a drainage bag placed below the level of your kidneys.  This helps prevent urine from backing up into your kidneys. You may wear a small drainage bag strapped to your leg to let you move around more easily.    Check the catheter to be sure it is in place after you change your clothes or do other activities.  Do not wear tight clothing over the tube. Place the tubing over your thigh rather than under it when you are sitting down. Be sure that nothing is pulling on the nephrostomy tube when you move around.    Change positions if you see little or no urine in your drainage bag.  Check to see if the urine tube is twisted or bent. Be sure that you are not sitting or lying on the tube. If there are no kinks and there is little or no urine in the drainage bag, tell your healthcare provider.    Flush out the tube as directed. Some tubes get flushed one time a day with 10 mls of NSS You will be given a prescription for the flushes.  To flush the nephrostomy tube, clean both connections with alcohol swap. Twist off the drainage bag tube and twist the saline syringe into the nephrostomy tube and flush briskly. Remove the syringe and twist the drainage bag tube back into the nephrostomy tube.  Keep the site covered while you shower.  Tape a piece of clear adhesive plastic over the dressing to keep it dry while you shower. Do not take tub baths.    Contact Interventional Radiology at [phone] ([facility] PATIENTS: Contact Interventional Radiology at [phone]) ([facility] PATIENTS: Contact Interventional Radiology at  [phone]) if:  The skin around the nephrostomy tube is red, swollen, itches, or has a rash.   You have a fever greater than 101 or chills.  You have lower back or hip pain.  There are changes in how your urine looks or smells.  You have little or no urine draining from the nephrostomy tube.   You have nausea and are vomiting.  The black bernardo on your tube has moved, or the tube is longer than when it was put in.   You have questions or concerns about your condition or care.  The nephrostomy tube comes out completely.   There is blood, pus, or a bad smell coming from the place where the tube enters your skin.  Urine is leaking around the tube.        The following pharmacies carry the flush syringes.       [facility]                     [address]                     [address] [facility]                    [phone]  [place]                       [place]  Phone [phone]            Phone [phone]                 [facility]                                                                                                   [phone]  [facility]             [facility]                             [address] [place], [place] [address] [facility]   Luis SANCHEZ                                 [phone]  Phone [phone]            Phone [phone]    [facility]                                                                         [facility] [phone]  Samuel Luz.  [place]   Phone [phone]

## 2024-10-16 NOTE — BRIEF OP NOTE (RAD/CATH)
INTERVENTIONAL RADIOLOGY PROCEDURE NOTE    Date: 10/16/2024    Procedure:   Procedure Summary       Date: 10/16/24 Room / Location: [facility] Interventional Radiology    Anesthesia Start:  Anesthesia Stop:     Procedure: IR NEPHROSTOMY TUBE CHECK/CHANGE/REPOSITION/REINSERTION/UPSIZE Diagnosis:       Ureteral stricture      (Chronic nephrostomy tubes)    Scheduled Providers:  Responsible Provider:     Anesthesia Type: Not recorded ASA Status: Not recorded            Preoperative diagnosis:   1. Ureteral stricture         Postoperative diagnosis: Same.    Surgeon: Gilberto Jennings MD     Assistant: None. No qualified resident was available.    Blood loss: Minimal    Specimens: None     Findings: Bilateral 10 F DM PCN exchange.    Complications: None immediate.    Anesthesia: conscious sedation

## 2024-10-17 LAB
ALBUMIN SERPL ELPH-MCNC: 3.66 G/DL (ref 3.2–5.1)
ALBUMIN SERPL ELPH-MCNC: 51.5 % (ref 48–70)
ALPHA1 GLOB SERPL ELPH-MCNC: 0.33 G/DL (ref 0.15–0.47)
ALPHA1 GLOB SERPL ELPH-MCNC: 4.6 % (ref 1.8–7)
ALPHA2 GLOB SERPL ELPH-MCNC: 0.76 G/DL (ref 0.42–1.04)
ALPHA2 GLOB SERPL ELPH-MCNC: 10.7 % (ref 5.9–14.9)
BETA GLOB ABNORMAL SERPL ELPH-MCNC: 0.39 G/DL (ref 0.31–0.57)
BETA1 GLOB SERPL ELPH-MCNC: 5.5 % (ref 4.7–7.7)
BETA2 GLOB SERPL ELPH-MCNC: 6.5 % (ref 3.1–7.9)
BETA2+GAMMA GLOB SERPL ELPH-MCNC: 0.46 G/DL (ref 0.2–0.58)
GAMMA GLOB ABNORMAL SERPL ELPH-MCNC: 1.51 G/DL (ref 0.4–1.66)
GAMMA GLOB SERPL ELPH-MCNC: 21.2 % (ref 6.9–22.3)
IGG/ALB SER: 1.06 {RATIO} (ref 1.1–1.8)
PROT PATTERN SERPL ELPH-IMP: ABNORMAL
PROT SERPL-MCNC: 7.1 G/DL (ref 6.4–8.2)

## 2024-10-17 PROCEDURE — 84165 PROTEIN E-PHORESIS SERUM: CPT | Performed by: STUDENT IN AN ORGANIZED HEALTH CARE EDUCATION/TRAINING PROGRAM

## 2024-10-17 RX ORDER — SODIUM CHLORIDE, SODIUM GLUCONATE, SODIUM ACETATE, POTASSIUM CHLORIDE, MAGNESIUM CHLORIDE, SODIUM PHOSPHATE, DIBASIC, AND POTASSIUM PHOSPHATE .53; .5; .37; .037; .03; .012; .00082 G/100ML; G/100ML; G/100ML; G/100ML; G/100ML; G/100ML; G/100ML
500 INJECTION, SOLUTION INTRAVENOUS ONCE
Status: CANCELLED | OUTPATIENT
Start: 2024-10-17 | End: 2024-10-17

## 2024-10-21 NOTE — TELEPHONE ENCOUNTER
Called and spoke with patient. She reports she is taking the Bactrim as ordered and is feeling well. She denies any visible hematuria and offers no complaints at this time. Patient is scheduled for ileal conduit urinary diversion with Dr. Lowe on 10/24.

## 2024-10-21 NOTE — TELEPHONE ENCOUNTER
Please follow-up with patient regarding complaints of gross hematuria.  Her preop urine culture was positive.  She was prescribed Bactrim on 10/17.  Please make sure that she is taking this as prescribed.

## 2024-10-24 ENCOUNTER — HOSPITAL ENCOUNTER (INPATIENT)
Facility: HOSPITAL | Age: 69
LOS: 6 days | Discharge: HOME HEALTH | DRG: 661 | End: 2024-10-30
Attending: UROLOGY | Admitting: UROLOGY
Payer: COMMERCIAL

## 2024-10-24 ENCOUNTER — ANESTHESIA (OUTPATIENT)
Dept: PERIOP | Facility: HOSPITAL | Age: 69
DRG: 661 | End: 2024-10-24
Payer: COMMERCIAL

## 2024-10-24 DIAGNOSIS — N13.30 BILATERAL HYDRONEPHROSIS: ICD-10-CM

## 2024-10-24 DIAGNOSIS — N13.5 URETERAL STRICTURE: ICD-10-CM

## 2024-10-24 DIAGNOSIS — N18.4 HYPERTENSIVE KIDNEY DISEASE WITH STAGE 4 CHRONIC KIDNEY DISEASE (HCC): Primary | ICD-10-CM

## 2024-10-24 DIAGNOSIS — I12.9 HYPERTENSIVE KIDNEY DISEASE WITH STAGE 4 CHRONIC KIDNEY DISEASE (HCC): Primary | ICD-10-CM

## 2024-10-24 PROBLEM — D63.1 ANEMIA DUE TO STAGE 4 CHRONIC KIDNEY DISEASE  (HCC): Status: ACTIVE | Noted: 2024-10-24

## 2024-10-24 LAB
ANION GAP SERPL CALCULATED.3IONS-SCNC: 11 MMOL/L (ref 4–13)
BUN SERPL-MCNC: 42 MG/DL (ref 5–25)
CALCIUM SERPL-MCNC: 8.2 MG/DL (ref 8.4–10.2)
CHLORIDE SERPL-SCNC: 103 MMOL/L (ref 96–108)
CO2 SERPL-SCNC: 20 MMOL/L (ref 21–32)
CREAT SERPL-MCNC: 2.46 MG/DL (ref 0.6–1.3)
ERYTHROCYTE [DISTWIDTH] IN BLOOD BY AUTOMATED COUNT: 12.9 % (ref 11.6–15.1)
GFR SERPL CREATININE-BSD FRML MDRD: 19 ML/MIN/1.73SQ M
GLUCOSE SERPL-MCNC: 174 MG/DL (ref 65–140)
HCT VFR BLD AUTO: 37.4 % (ref 34.8–46.1)
HGB BLD-MCNC: 11.7 G/DL (ref 11.5–15.4)
MCH RBC QN AUTO: 29 PG (ref 26.8–34.3)
MCHC RBC AUTO-ENTMCNC: 31.3 G/DL (ref 31.4–37.4)
MCV RBC AUTO: 93 FL (ref 82–98)
PLATELET # BLD AUTO: 278 THOUSANDS/UL (ref 149–390)
PLATELET # BLD AUTO: 286 THOUSANDS/UL (ref 149–390)
PMV BLD AUTO: 9.6 FL (ref 8.9–12.7)
PMV BLD AUTO: 9.8 FL (ref 8.9–12.7)
POTASSIUM SERPL-SCNC: 4.5 MMOL/L (ref 3.5–5.3)
RBC # BLD AUTO: 4.03 MILLION/UL (ref 3.81–5.12)
SODIUM SERPL-SCNC: 134 MMOL/L (ref 135–147)
WBC # BLD AUTO: 23.28 THOUSAND/UL (ref 4.31–10.16)

## 2024-10-24 PROCEDURE — 85027 COMPLETE CBC AUTOMATED: CPT | Performed by: UROLOGY

## 2024-10-24 PROCEDURE — 88342 IMHCHEM/IMCYTCHM 1ST ANTB: CPT | Performed by: STUDENT IN AN ORGANIZED HEALTH CARE EDUCATION/TRAINING PROGRAM

## 2024-10-24 PROCEDURE — C2625 STENT, NON-COR, TEM W/DEL SY: HCPCS | Performed by: UROLOGY

## 2024-10-24 PROCEDURE — 88341 IMHCHEM/IMCYTCHM EA ADD ANTB: CPT | Performed by: STUDENT IN AN ORGANIZED HEALTH CARE EDUCATION/TRAINING PROGRAM

## 2024-10-24 PROCEDURE — 0T180ZA BYPASS BILATERAL URETERS TO ILEUM, OPEN APPROACH: ICD-10-PCS | Performed by: UROLOGY

## 2024-10-24 PROCEDURE — 85049 AUTOMATED PLATELET COUNT: CPT | Performed by: UROLOGY

## 2024-10-24 PROCEDURE — 88313 SPECIAL STAINS GROUP 2: CPT | Performed by: STUDENT IN AN ORGANIZED HEALTH CARE EDUCATION/TRAINING PROGRAM

## 2024-10-24 PROCEDURE — 80048 BASIC METABOLIC PNL TOTAL CA: CPT | Performed by: UROLOGY

## 2024-10-24 PROCEDURE — 0TB70ZZ EXCISION OF LEFT URETER, OPEN APPROACH: ICD-10-PCS | Performed by: OBSTETRICS & GYNECOLOGY

## 2024-10-24 PROCEDURE — 86923 COMPATIBILITY TEST ELECTRIC: CPT

## 2024-10-24 PROCEDURE — 88305 TISSUE EXAM BY PATHOLOGIST: CPT | Performed by: STUDENT IN AN ORGANIZED HEALTH CARE EDUCATION/TRAINING PROGRAM

## 2024-10-24 PROCEDURE — 99222 1ST HOSP IP/OBS MODERATE 55: CPT | Performed by: INTERNAL MEDICINE

## 2024-10-24 PROCEDURE — 0TB60ZZ EXCISION OF RIGHT URETER, OPEN APPROACH: ICD-10-PCS | Performed by: OBSTETRICS & GYNECOLOGY

## 2024-10-24 DEVICE — STENT SILITEK UROPASS 7FR X 90CM: Type: IMPLANTABLE DEVICE | Site: URETER | Status: FUNCTIONAL

## 2024-10-24 RX ORDER — LIDOCAINE HYDROCHLORIDE 10 MG/ML
INJECTION, SOLUTION EPIDURAL; INFILTRATION; INTRACAUDAL; PERINEURAL AS NEEDED
Status: DISCONTINUED | OUTPATIENT
Start: 2024-10-24 | End: 2024-10-24

## 2024-10-24 RX ORDER — EPHEDRINE SULFATE 50 MG/ML
INJECTION INTRAVENOUS AS NEEDED
Status: DISCONTINUED | OUTPATIENT
Start: 2024-10-24 | End: 2024-10-24

## 2024-10-24 RX ORDER — ONDANSETRON 2 MG/ML
INJECTION INTRAMUSCULAR; INTRAVENOUS AS NEEDED
Status: DISCONTINUED | OUTPATIENT
Start: 2024-10-24 | End: 2024-10-24

## 2024-10-24 RX ORDER — FENTANYL CITRATE/PF 50 MCG/ML
25 SYRINGE (ML) INJECTION
Status: DISCONTINUED | OUTPATIENT
Start: 2024-10-24 | End: 2024-10-24

## 2024-10-24 RX ORDER — ONDANSETRON 2 MG/ML
4 INJECTION INTRAMUSCULAR; INTRAVENOUS ONCE AS NEEDED
Status: DISCONTINUED | OUTPATIENT
Start: 2024-10-24 | End: 2024-10-24 | Stop reason: HOSPADM

## 2024-10-24 RX ORDER — NALOXONE HYDROCHLORIDE 0.4 MG/ML
0.1 INJECTION, SOLUTION INTRAMUSCULAR; INTRAVENOUS; SUBCUTANEOUS
Status: DISCONTINUED | OUTPATIENT
Start: 2024-10-24 | End: 2024-10-30 | Stop reason: HOSPADM

## 2024-10-24 RX ORDER — HYDROMORPHONE HCL/PF 1 MG/ML
0.5 SYRINGE (ML) INJECTION
Status: DISCONTINUED | OUTPATIENT
Start: 2024-10-24 | End: 2024-10-27

## 2024-10-24 RX ORDER — ACETAMINOPHEN 325 MG/1
650 TABLET ORAL EVERY 6 HOURS SCHEDULED
Status: DISCONTINUED | OUTPATIENT
Start: 2024-10-24 | End: 2024-10-30 | Stop reason: HOSPADM

## 2024-10-24 RX ORDER — METRONIDAZOLE 500 MG/100ML
500 INJECTION, SOLUTION INTRAVENOUS ONCE
Status: COMPLETED | OUTPATIENT
Start: 2024-10-24 | End: 2024-10-24

## 2024-10-24 RX ORDER — LIDOCAINE HYDROCHLORIDE AND EPINEPHRINE 15; 5 MG/ML; UG/ML
INJECTION, SOLUTION EPIDURAL
Status: COMPLETED | OUTPATIENT
Start: 2024-10-24 | End: 2024-10-24

## 2024-10-24 RX ORDER — FLUTICASONE PROPIONATE 50 MCG
1 SPRAY, SUSPENSION (ML) NASAL DAILY
Status: DISCONTINUED | OUTPATIENT
Start: 2024-10-24 | End: 2024-10-30 | Stop reason: HOSPADM

## 2024-10-24 RX ORDER — ONDANSETRON 2 MG/ML
4 INJECTION INTRAMUSCULAR; INTRAVENOUS EVERY 4 HOURS PRN
Status: DISCONTINUED | OUTPATIENT
Start: 2024-10-24 | End: 2024-10-24 | Stop reason: SDUPTHER

## 2024-10-24 RX ORDER — MAGNESIUM HYDROXIDE 1200 MG/15ML
LIQUID ORAL AS NEEDED
Status: DISCONTINUED | OUTPATIENT
Start: 2024-10-24 | End: 2024-10-24 | Stop reason: HOSPADM

## 2024-10-24 RX ORDER — DEXAMETHASONE SODIUM PHOSPHATE 10 MG/ML
INJECTION, SOLUTION INTRAMUSCULAR; INTRAVENOUS AS NEEDED
Status: DISCONTINUED | OUTPATIENT
Start: 2024-10-24 | End: 2024-10-24

## 2024-10-24 RX ORDER — MIDAZOLAM HYDROCHLORIDE 2 MG/2ML
INJECTION, SOLUTION INTRAMUSCULAR; INTRAVENOUS AS NEEDED
Status: DISCONTINUED | OUTPATIENT
Start: 2024-10-24 | End: 2024-10-24

## 2024-10-24 RX ORDER — HEPARIN SODIUM 5000 [USP'U]/ML
5000 INJECTION, SOLUTION INTRAVENOUS; SUBCUTANEOUS EVERY 12 HOURS SCHEDULED
Status: DISCONTINUED | OUTPATIENT
Start: 2024-10-24 | End: 2024-10-30 | Stop reason: HOSPADM

## 2024-10-24 RX ORDER — ONDANSETRON 2 MG/ML
4 INJECTION INTRAMUSCULAR; INTRAVENOUS EVERY 6 HOURS PRN
Status: DISCONTINUED | OUTPATIENT
Start: 2024-10-24 | End: 2024-10-27

## 2024-10-24 RX ORDER — PANTOPRAZOLE SODIUM 20 MG/1
20 TABLET, DELAYED RELEASE ORAL
Status: DISCONTINUED | OUTPATIENT
Start: 2024-10-25 | End: 2024-10-30 | Stop reason: HOSPADM

## 2024-10-24 RX ORDER — PROPOFOL 10 MG/ML
INJECTION, EMULSION INTRAVENOUS AS NEEDED
Status: DISCONTINUED | OUTPATIENT
Start: 2024-10-24 | End: 2024-10-24

## 2024-10-24 RX ORDER — DEXTROSE, SODIUM CHLORIDE, SODIUM LACTATE, POTASSIUM CHLORIDE, AND CALCIUM CHLORIDE 5; .6; .31; .03; .02 G/100ML; G/100ML; G/100ML; G/100ML; G/100ML
125 INJECTION, SOLUTION INTRAVENOUS CONTINUOUS
Status: DISCONTINUED | OUTPATIENT
Start: 2024-10-24 | End: 2024-10-26

## 2024-10-24 RX ORDER — SODIUM CHLORIDE, SODIUM LACTATE, POTASSIUM CHLORIDE, CALCIUM CHLORIDE 600; 310; 30; 20 MG/100ML; MG/100ML; MG/100ML; MG/100ML
75 INJECTION, SOLUTION INTRAVENOUS CONTINUOUS
Status: CANCELLED | OUTPATIENT
Start: 2024-10-24

## 2024-10-24 RX ORDER — FOLIC ACID 1 MG/1
1 TABLET ORAL
Status: DISCONTINUED | OUTPATIENT
Start: 2024-10-24 | End: 2024-10-30 | Stop reason: HOSPADM

## 2024-10-24 RX ORDER — VENLAFAXINE HYDROCHLORIDE 37.5 MG/1
37.5 CAPSULE, EXTENDED RELEASE ORAL DAILY
Status: DISCONTINUED | OUTPATIENT
Start: 2024-10-25 | End: 2024-10-30 | Stop reason: HOSPADM

## 2024-10-24 RX ORDER — HYDROMORPHONE HCL IN WATER/PF 6 MG/30 ML
0.2 PATIENT CONTROLLED ANALGESIA SYRINGE INTRAVENOUS
Status: DISCONTINUED | OUTPATIENT
Start: 2024-10-24 | End: 2024-10-24 | Stop reason: HOSPADM

## 2024-10-24 RX ORDER — HYDROMORPHONE HCL/PF 1 MG/ML
SYRINGE (ML) INJECTION AS NEEDED
Status: DISCONTINUED | OUTPATIENT
Start: 2024-10-24 | End: 2024-10-24

## 2024-10-24 RX ORDER — CALCITRIOL 0.25 UG/1
0.25 CAPSULE, LIQUID FILLED ORAL 3 TIMES WEEKLY
Status: DISCONTINUED | OUTPATIENT
Start: 2024-10-25 | End: 2024-10-30 | Stop reason: HOSPADM

## 2024-10-24 RX ORDER — SODIUM CHLORIDE 9 MG/ML
125 INJECTION, SOLUTION INTRAVENOUS CONTINUOUS
Status: DISCONTINUED | OUTPATIENT
Start: 2024-10-24 | End: 2024-10-24

## 2024-10-24 RX ORDER — DOCUSATE SODIUM 100 MG/1
100 CAPSULE, LIQUID FILLED ORAL 2 TIMES DAILY
Status: DISCONTINUED | OUTPATIENT
Start: 2024-10-24 | End: 2024-10-30 | Stop reason: HOSPADM

## 2024-10-24 RX ORDER — ROCURONIUM BROMIDE 10 MG/ML
INJECTION, SOLUTION INTRAVENOUS AS NEEDED
Status: DISCONTINUED | OUTPATIENT
Start: 2024-10-24 | End: 2024-10-24

## 2024-10-24 RX ORDER — LANOLIN ALCOHOL/MO/W.PET/CERES
1 CREAM (GRAM) TOPICAL
Status: DISCONTINUED | OUTPATIENT
Start: 2024-10-25 | End: 2024-10-30 | Stop reason: HOSPADM

## 2024-10-24 RX ORDER — LORATADINE 10 MG/1
10 TABLET ORAL DAILY
Status: DISCONTINUED | OUTPATIENT
Start: 2024-10-24 | End: 2024-10-30 | Stop reason: HOSPADM

## 2024-10-24 RX ORDER — FENTANYL CITRATE 50 UG/ML
INJECTION, SOLUTION INTRAMUSCULAR; INTRAVENOUS AS NEEDED
Status: DISCONTINUED | OUTPATIENT
Start: 2024-10-24 | End: 2024-10-24

## 2024-10-24 RX ORDER — SODIUM CHLORIDE, SODIUM LACTATE, POTASSIUM CHLORIDE, CALCIUM CHLORIDE 600; 310; 30; 20 MG/100ML; MG/100ML; MG/100ML; MG/100ML
INJECTION, SOLUTION INTRAVENOUS CONTINUOUS PRN
Status: DISCONTINUED | OUTPATIENT
Start: 2024-10-24 | End: 2024-10-24

## 2024-10-24 RX ORDER — SODIUM CHLORIDE, SODIUM GLUCONATE, SODIUM ACETATE, POTASSIUM CHLORIDE, MAGNESIUM CHLORIDE, SODIUM PHOSPHATE, DIBASIC, AND POTASSIUM PHOSPHATE .53; .5; .37; .037; .03; .012; .00082 G/100ML; G/100ML; G/100ML; G/100ML; G/100ML; G/100ML; G/100ML
500 INJECTION, SOLUTION INTRAVENOUS ONCE
Status: COMPLETED | OUTPATIENT
Start: 2024-10-24 | End: 2024-10-24

## 2024-10-24 RX ORDER — CALCIUM ACETATE 667 MG/1
667 CAPSULE ORAL
Status: DISCONTINUED | OUTPATIENT
Start: 2024-10-24 | End: 2024-10-30 | Stop reason: HOSPADM

## 2024-10-24 RX ORDER — LEVOFLOXACIN 5 MG/ML
500 INJECTION, SOLUTION INTRAVENOUS ONCE
Status: COMPLETED | OUTPATIENT
Start: 2024-10-24 | End: 2024-10-24

## 2024-10-24 RX ORDER — ARIPIPRAZOLE 10 MG/1
20 TABLET ORAL EVERY MORNING
Status: DISCONTINUED | OUTPATIENT
Start: 2024-10-25 | End: 2024-10-30 | Stop reason: HOSPADM

## 2024-10-24 RX ADMIN — DEXTROSE, SODIUM CHLORIDE, SODIUM LACTATE, POTASSIUM CHLORIDE, AND CALCIUM CHLORIDE 125 ML/HR: 5; .6; .31; .03; .02 INJECTION, SOLUTION INTRAVENOUS at 22:39

## 2024-10-24 RX ADMIN — EPHEDRINE SULFATE 5 MG: 50 INJECTION, SOLUTION INTRAVENOUS at 08:50

## 2024-10-24 RX ADMIN — PROPOFOL 130 MG: 10 INJECTION, EMULSION INTRAVENOUS at 08:09

## 2024-10-24 RX ADMIN — MIDAZOLAM 2 MG: 1 INJECTION INTRAMUSCULAR; INTRAVENOUS at 07:25

## 2024-10-24 RX ADMIN — PHENYLEPHRINE HYDROCHLORIDE 100 MCG: 10 INJECTION INTRAVENOUS at 12:43

## 2024-10-24 RX ADMIN — SODIUM CHLORIDE 1000 ML: 0.9 INJECTION, SOLUTION INTRAVENOUS at 19:42

## 2024-10-24 RX ADMIN — ROCURONIUM 20 MG: 50 INJECTION, SOLUTION INTRAVENOUS at 08:50

## 2024-10-24 RX ADMIN — ROCURONIUM 20 MG: 50 INJECTION, SOLUTION INTRAVENOUS at 11:22

## 2024-10-24 RX ADMIN — HEPARIN SODIUM 5000 UNITS: 5000 INJECTION INTRAVENOUS; SUBCUTANEOUS at 14:38

## 2024-10-24 RX ADMIN — LIDOCAINE HYDROCHLORIDE 40 MG: 10 INJECTION, SOLUTION EPIDURAL; INFILTRATION; INTRACAUDAL; PERINEURAL at 08:09

## 2024-10-24 RX ADMIN — ROCURONIUM 10 MG: 50 INJECTION, SOLUTION INTRAVENOUS at 09:38

## 2024-10-24 RX ADMIN — ROCURONIUM 20 MG: 50 INJECTION, SOLUTION INTRAVENOUS at 08:37

## 2024-10-24 RX ADMIN — PHENYLEPHRINE HYDROCHLORIDE 100 MCG: 10 INJECTION INTRAVENOUS at 10:46

## 2024-10-24 RX ADMIN — ROCURONIUM 50 MG: 50 INJECTION, SOLUTION INTRAVENOUS at 08:09

## 2024-10-24 RX ADMIN — LEVOFLOXACIN: 5 INJECTION, SOLUTION INTRAVENOUS at 08:20

## 2024-10-24 RX ADMIN — HYDROMORPHONE HYDROCHLORIDE 0.5 MG: 1 INJECTION, SOLUTION INTRAMUSCULAR; INTRAVENOUS; SUBCUTANEOUS at 11:19

## 2024-10-24 RX ADMIN — SODIUM CHLORIDE, SODIUM GLUCONATE, SODIUM ACETATE, POTASSIUM CHLORIDE, MAGNESIUM CHLORIDE, SODIUM PHOSPHATE, DIBASIC, AND POTASSIUM PHOSPHATE 500 ML: .53; .5; .37; .037; .03; .012; .00082 INJECTION, SOLUTION INTRAVENOUS at 06:19

## 2024-10-24 RX ADMIN — ROPIVACAINE HYDROCHLORIDE: 5 INJECTION EPIDURAL; INFILTRATION; PERINEURAL at 09:14

## 2024-10-24 RX ADMIN — LIDOCAINE HYDROCHLORIDE AND EPINEPHRINE 3 ML: 15; 5 INJECTION, SOLUTION EPIDURAL at 08:05

## 2024-10-24 RX ADMIN — SODIUM CHLORIDE, SODIUM LACTATE, POTASSIUM CHLORIDE, AND CALCIUM CHLORIDE: .6; .31; .03; .02 INJECTION, SOLUTION INTRAVENOUS at 09:11

## 2024-10-24 RX ADMIN — FENTANYL CITRATE 50 MCG: 50 INJECTION INTRAMUSCULAR; INTRAVENOUS at 07:35

## 2024-10-24 RX ADMIN — SODIUM CHLORIDE, SODIUM LACTATE, POTASSIUM CHLORIDE, AND CALCIUM CHLORIDE: .6; .31; .03; .02 INJECTION, SOLUTION INTRAVENOUS at 12:09

## 2024-10-24 RX ADMIN — SUGAMMADEX 300 MG: 100 INJECTION, SOLUTION INTRAVENOUS at 12:29

## 2024-10-24 RX ADMIN — FENTANYL CITRATE 50 MCG: 50 INJECTION INTRAMUSCULAR; INTRAVENOUS at 07:25

## 2024-10-24 RX ADMIN — SODIUM CHLORIDE, SODIUM LACTATE, POTASSIUM CHLORIDE, AND CALCIUM CHLORIDE: .6; .31; .03; .02 INJECTION, SOLUTION INTRAVENOUS at 08:20

## 2024-10-24 RX ADMIN — ROCURONIUM 20 MG: 50 INJECTION, SOLUTION INTRAVENOUS at 10:30

## 2024-10-24 RX ADMIN — HYDROMORPHONE HYDROCHLORIDE 0.2 MG: 0.2 INJECTION, SOLUTION INTRAMUSCULAR; INTRAVENOUS; SUBCUTANEOUS at 13:30

## 2024-10-24 RX ADMIN — SODIUM CHLORIDE, SODIUM LACTATE, POTASSIUM CHLORIDE, AND CALCIUM CHLORIDE: .6; .31; .03; .02 INJECTION, SOLUTION INTRAVENOUS at 07:30

## 2024-10-24 RX ADMIN — PHENYLEPHRINE HYDROCHLORIDE 100 MCG: 10 INJECTION INTRAVENOUS at 10:57

## 2024-10-24 RX ADMIN — METRONIDAZOLE: 500 INJECTION, SOLUTION INTRAVENOUS at 08:17

## 2024-10-24 RX ADMIN — DEXAMETHASONE SODIUM PHOSPHATE 10 MG: 10 INJECTION, SOLUTION INTRAMUSCULAR; INTRAVENOUS at 08:36

## 2024-10-24 RX ADMIN — EPHEDRINE SULFATE 5 MG: 50 INJECTION, SOLUTION INTRAVENOUS at 08:53

## 2024-10-24 RX ADMIN — DEXTROSE, SODIUM CHLORIDE, SODIUM LACTATE, POTASSIUM CHLORIDE, AND CALCIUM CHLORIDE 125 ML/HR: 5; .6; .31; .03; .02 INJECTION, SOLUTION INTRAVENOUS at 14:39

## 2024-10-24 RX ADMIN — PHENYLEPHRINE HYDROCHLORIDE 30 MCG/MIN: 10 INJECTION INTRAVENOUS at 08:18

## 2024-10-24 RX ADMIN — PHENYLEPHRINE HYDROCHLORIDE 100 MCG: 10 INJECTION INTRAVENOUS at 08:48

## 2024-10-24 RX ADMIN — PROPOFOL 20 MG: 10 INJECTION, EMULSION INTRAVENOUS at 12:30

## 2024-10-24 RX ADMIN — HEPARIN SODIUM 5000 UNITS: 5000 INJECTION INTRAVENOUS; SUBCUTANEOUS at 21:07

## 2024-10-24 RX ADMIN — PHENYLEPHRINE HYDROCHLORIDE 100 MCG: 10 INJECTION INTRAVENOUS at 08:28

## 2024-10-24 RX ADMIN — ROCURONIUM 10 MG: 50 INJECTION, SOLUTION INTRAVENOUS at 10:33

## 2024-10-24 RX ADMIN — FOLIC ACID 1 MG: 1 TABLET ORAL at 21:04

## 2024-10-24 RX ADMIN — ONDANSETRON 4 MG: 2 INJECTION INTRAMUSCULAR; INTRAVENOUS at 12:21

## 2024-10-24 RX ADMIN — ONDANSETRON 4 MG: 2 INJECTION INTRAMUSCULAR; INTRAVENOUS at 14:39

## 2024-10-24 NOTE — ASSESSMENT & PLAN NOTE
Has been greater than 2 years off treatment per gyn oncology feelsthis is prolonged remission  GYn oncology following

## 2024-10-24 NOTE — ANESTHESIA PROCEDURE NOTES
Epidural Block    Patient location during procedure: holding area  Start time: 10/24/2024 7:50 AM  Reason for block: procedure for pain  Staffing  Performed by: Vaishali Amanda MD  Authorized by: Vaishali Amanda MD    Preanesthetic Checklist  Completed: patient identified, IV checked, site marked, risks and benefits discussed, surgical consent, monitors and equipment checked, pre-op evaluation and timeout performed  Epidural  Patient position: sitting  Prep: ChloraPrep  Sedation Level: light sedation  Patient monitoring: frequent blood pressure checks, continuous pulse oximetry and heart rate  Approach: midline  Location: thoracic, T8-9  Injection technique: RICHARDSON saline  Needle  Needle type: Tuohy   Needle gauge: 17 G  Needle insertion depth: 9 cm  Catheter type: multi-orifice  Catheter size: 20 G  Catheter at skin depth: 14 cm  Catheter securement method: clear occlusive dressing and liquid medical adhesive  Test dose: negativelidocaine-epinephrine (XYLOCAINE-MPF/EPINEPHRINE) 1.5 %-1:200,000 injection 3 mL - Epidural   3 mL - 10/24/2024 8:05:00 AM  Assessment  Sensory level: T10  Number of attempts: 2negative aspiration for CSF, negative aspiration for heme and no paresthesia on injection  patient tolerated the procedure well with no immediate complications  Additional Notes  Steep angle to achieve loss. Difficulty threading catheter. Required rotation of needle and significant pressure. Loss confirmed with air and saline.

## 2024-10-24 NOTE — OP NOTE
OPERATIVE REPORT  PATIENT NAME: Ragini Melendez    :  1955  MRN: [id number]  Pt Location: [facility] OR ROOM 07    SURGERY DATE: 10/24/2024    Surgeons and Role:     * Demetrius Lowe MD - Primary     * Judson Desai MD - Assisting     * Beth Rodriguez MD - Co-surgeon    Preop Diagnosis:  Ureteral stricture [N13.5]    Post-Op Diagnosis Codes:     * Ureteral stricture [N13.5]    Procedure(s):  ILEAL CONDUIT urinary diversion (No cystectomy). BILATERAL UTERO-LYSIS    Specimen(s):  ID Type Source Tests Collected by Time Destination   1 : DISTAL RIGHT URETER Tissue Ureter, Right TISSUE EXAM Demetrius Lowe MD 10/24/2024 11:10 AM    2 : DISTAL LEFT URETER Tissue Ureter, Left TISSUE EXAM Demetrius Lowe MD 10/24/2024 11:42 AM        Estimated Blood Loss:   Minimal    Drains:  Nephrostomy Left 10 Fr. (Active)   Site Assessment Clean;Intact 10/24/24 0648   Collection Container Leg bag 10/24/24 0648   Securement Method Tape 10/24/24 0648   Urine Color Yellow/straw 10/24/24 0648   Number of days: 8       Nephrostomy Right 10.2 Fr. (Active)   Site Assessment Clean;Intact 10/24/24 0647   Collection Container Leg bag 10/24/24 0647   Securement Method Tape 10/24/24 0647   Urine Color Yellow/straw 10/24/24 0647   Number of days: 8       Urostomy Ileal conduit RLQ (Active)   Number of days: 0       Urethral Catheter Double-lumen 24 Fr. (Active)   Number of days: 0       Ureteral Internal Stent Right ureter 6 Fr. (Active)   Number of days: 574       Ureteral Internal Stent Left ureter 7 Fr. (Active)   Number of days: 534       Ureteral Internal Stent Right ureter 7 Fr. (Active)   Number of days: 534       Anesthesia Type:   General    Operative Indications:  Ureteral stricture [N13.5] The patient is a 69 y.o. year-old with a history of recurrent endometrial cancer s/p chemotherapy and radiation currently in remission.  Pt has permenant bilateral PCNs secondary to pelvic fibrosis and chronic ureteral  obstruction and desires attempt at diversion. The results of her diagnostic testing, her differential diagnosis and treatment options, and the benefits and risks of these were reviewed. She opted to proceed with surgical management.    Operative Findings:  Normal bowel, omentum, Omentum adherent to anterior abdominal wall and unable ti visualize upper abdomen. Palpation of bilateral diaphragms clear of disease. Significantly fibroids pelvic side walls without thickened peritoneum throughout. Bilateral ureters with obstruction/fibrosis at the pelvic brim on the right and about 2cm below the pelvic brim on the left. Significant atrophy and narrowing of the right ureter.     Case required 2 co-surgeons from two specialities given the history of endometrial cancer and radiation therapy with complex pelvic anatomy and dissection required     Complications:   None    Procedure and Technique:    The patient was met in the preoperative waiting area, where consents were reviewed, and all questions were answered. She was then brought to the operating room and placed in a dorsal lithotomy position. General endotracheal tube anesthesia was achieved without difficulty. An appropriate time-out procedure was completed with all members of the operating room team present. She was then prepped and draped in usual sterile fashion. A Sifuentes catheter was inserted into the bladder using sterile technique.     A vertical skin incision was made with the scalpel and carried down to the fascia with bovie cautery.  The fascia was excised and extended to umbilicus.  The peritoneum was identified and entered sharply, then extended taking care to avoid the bladder.  Exploration of the abdomen and pelvis revealed the above noted findings.     A Book Marcell retractor was placed and the bowels were carefully packed away with moist sponges. Attention was paid to avoid the injury to neurovascular components or surrounding bowel.     The pelvic  peritoneum was densely fibrotic and careful examination cephalad revealed IVC and psoas upon which the ureter was identified. Ureter was tagged with a vessel loop and carefully freed from surrounding attachments. This was carried down as caudad as possible but significant narrowing and fibrosis was noted at the pelvic brim. Retraction was repositioned and left pelvis was identified. Sigmoid colon was dissected free and underlying psoas was identified. Again careful examination cephalad allowed identification of healthy ureter within fibrotic peritoneum. Ureter was again tagged and dissected free from surrounding attachments. This was above to be carried down about 2cm from pelvic brim and freed from pelvic peritoneum. Ureter was then clipped and transected. Sigmoid colon was then elevated and window was created at the sacral promontory through which the ureter was passed. Window was extended to allow movement and no tension.     Right ureter was then clipped and transected. This was noted to be extremely narrow. Repeat trimming of ureter allowed identification of orifice through which lacrimal duct dilatro was placed.     I then assisted Dr. Lowe in the creation of the ileal conduit. See his dictation for details.       The pelvis was then examined for hemostasis, which was noted.    All sponges and instruments were removed from the abdomen and pelvis.  The bowels were then returned to their normal anatomic position. Ostomy creation and closure was completed by Dr. Lowe.       All sponge, needle and instrument counts were correct x2.  Anesthesia was reversed and the patient was taken to the PACU in a stable condition.    I was present for the entire procedure.    Patient Disposition:  PACU     SIGNATURE: Beth Rodriguez MD  DATE: October 24, 2024  TIME: 12:09 PM

## 2024-10-24 NOTE — QUICK NOTE
Gyn Onc Fellow Post Op Check     I/E: none    S: Patient reports that pain is moderately controlled at rest but notes pelvic cramping. She is tolerating sips of clears w/o n/v. She denies CP or SOB. She is not yet ambulating. She is voiding via ileal conduit.    O: Temp:  [97.5 °F (36.4 °C)-97.9 °F (36.6 °C)] 97.9 °F (36.6 °C)  HR:  [61-86] 84  BP: ()/(51-79) 136/79  Resp:  [14-22] 16  SpO2:  [94 %-100 %] 95 %  O2 Device: None (Room air)    Intake/Output Summary (Last 24 hours) at 10/24/2024 1834  Last data filed at 10/24/2024 1700  Gross per 24 hour   Intake 2880 ml   Output 140 ml   Net 2740 ml     UOP: blood tinged urine via ileal conduit, approximately 60cc /4h (0.2cc/kg/h)    SUSAN: ss 140cc     Gen: AO x 3, NAD  Lungs: no increased work of breathing  Abd: soft, appropriately tender with voluntary guarding. Incisions with bandages, C/D/I. Ileal conduit pink.   : PCNs capped  Ext: no edema, SCDs on    Lab Results   Component Value Date    WBC 23.28 (H) 10/24/2024    HGB 11.7 10/24/2024    HCT 37.4 10/24/2024    MCV 93 10/24/2024     10/24/2024     Lab Results   Component Value Date    SODIUM 134 (L) 10/24/2024    K 4.5 10/24/2024     10/24/2024    CO2 20 (L) 10/24/2024    BUN 42 (H) 10/24/2024    CREATININE 2.46 (H) 10/24/2024    GLUC 174 (H) 10/24/2024    CALCIUM 8.2 (L) 10/24/2024         A/P: 69 y.o. with h/o endometrial cancer s/p chemo/EBRT with  bilateral ureteral strictures now POD#0 s/p ex lap, bilateral ureterolysis, ileal conduit, recovering appropriately post operatively. VSS. Encouraged use of PCEA and demand boluses. CBC wnl. MONROE noted in postop setting with ureteral manipulation, suspect some element of hypovolemia given hemoconcentration and low UOP.     - Reviewed intraoperative findings with patient   - Consider fluid bolus   - Continue to trend Cr  - Appreciate nephrology recs  - Appreciate care by primary team   - Gyn Onc will continue to follow    GATO Desai MD  PGY-7  Gynecologic Oncology Fellow

## 2024-10-24 NOTE — RESPIRATORY THERAPY NOTE
RT Protocol Note  Ragini Melendez 69 y.o. female MRN: [id number]  Unit/Bed#: -01 Encounter: [id number]    Assessment    Principal Problem:    Bilateral hydronephrosis  Active Problems:    Benign essential hypertension    Endometrial cancer (HCC)    CKD (chronic kidney disease) stage 4, GFR 15-29 ml/min (HCC)    Anemia due to stage 4 chronic kidney disease  (HCC)      Home Pulmonary Medications:  None       Past Medical History:   Diagnosis Date    Anemia     Anxiety     Cancer (HCC)     ENDOMETRIAL    Chemotherapy induced neutropenia (HCC) 08/30/2019    Chronic kidney disease     CKD (chronic kidney disease) stage 3, GFR 30-59 ml/min (HCC)     COVID     Fall 2022    Depression     DVT (deep venous thrombosis) (HCC) 07/19/2019    RIGHT LEG    Endometrial cancer (HCC) 12/2017    GERD (gastroesophageal reflux disease)     H/O gastric bypass     Hard to intubate     pt denies AS OF 7/25/23    History of chemotherapy     started 12/2021endometrial cancer- done 12/23/22    History of DVT in adulthood     RLE    History of transfusion 04/13/2023    Hyperglycemia     vx type 2 dm -- last assessed 4/1/14; resolved 11/7/17    Hyperlipidemia     Hypertension     in past- no meds at present    Iron deficiency anemia     iron infusions weekly    Iron deficiency anemia secondary to inadequate dietary iron intake 02/08/2023    OAB (overactive bladder)     Port-A-Cath in place     Wears glasses      Social History     Socioeconomic History    Marital status: Single     Spouse name: None    Number of children: None    Years of education: None    Highest education level: None   Occupational History    None   Tobacco Use    Smoking status: Never    Smokeless tobacco: Never   Vaping Use    Vaping status: Never Used   Substance and Sexual Activity    Alcohol use: Never    Drug use: Never    Sexual activity: Not Currently   Other Topics Concern    None   Social History Narrative    None     Social Determinants of Health  "    Financial Resource Strain: Low Risk  (9/26/2023)    Overall Financial Resource Strain (CARDIA)     Difficulty of Paying Living Expenses: Not very hard   Food Insecurity: No Food Insecurity (5/30/2023)    Hunger Vital Sign     Worried About Running Out of Food in the Last Year: Never true     Ran Out of Food in the Last Year: Never true   Transportation Needs: No Transportation Needs (9/26/2023)    PRAPARE - Transportation     Lack of Transportation (Medical): No     Lack of Transportation (Non-Medical): No   Physical Activity: Not on file   Stress: Not on file   Social Connections: Not on file   Intimate Partner Violence: Not on file   Housing Stability: Low Risk  (5/30/2023)    Housing Stability Vital Sign     Unable to Pay for Housing in the Last Year: No     Number of Places Lived in the Last Year: 1     Unstable Housing in the Last Year: No       Subjective         Objective    Physical Exam:   Assessment Type: Assess only  General Appearance: Awake, Drowsy, Other (Comment) (in pair)  Respiratory Pattern: Normal  Chest Assessment: Chest expansion symmetrical  Bilateral Breath Sounds: Clear  Location Specific: No  Cough: None  O2 Device: room air    Vitals:  Blood pressure 136/79, pulse 84, temperature 97.9 °F (36.6 °C), resp. rate 16, height 4' 9\" (1.448 m), weight 71.7 kg (158 lb), SpO2 95%, not currently breastfeeding.          Imaging and other studies: Results Review Statement: I reviewed radiology reports from this admission including: No CXR films of any recent date to review.    O2 Device: room air     Plan    Respiratory Plan: Discontinue Protocol        Resp Comments: \"69-year-old female with a past medical history of stage IV chronic kidney disease, hypertension, thrombocytopenia, uterine cancer status post bilateral uterine obstruction requiring percutaneous nephrostomy tubes presented for ilial conduit with urinary diversion.\" Now s/p ILEAL CONDUIT urinary diversion (No cystectomy), BILATERAL " UTEROLYSIS.  Currently on Room Air with SpO2's > 91%.  No history of pulm dx's, pulm home meds or nicotine usage (ever).  No recent CXR's to review. Will recommend (spoke to nrsg already) that pt use an incentive spiromenter supervised by nrsg and that our Respiratory Care Protocol for now, be discontinued.

## 2024-10-24 NOTE — OP NOTE
OPERATIVE REPORT  PATIENT NAME: Ragini Melendez    :  1955  MRN: [id number]  Pt Location: [facility] OR ROOM 07    SURGERY DATE: 10/24/2024    Surgeons and Role:     * Demetrius Lowe MD - Primary     * Judson Desai MD - Assisting     * Beth Rodriguez MD - Co-surgeon      2 attending surgeons were necessary for this case from 2 separate disciplines based on the complex nature of the case and prior history of radiation.  Dr. Rodriguez was a necessary co-surgeon.    Preop Diagnosis:  Ureteral stricture [N13.5]    Post-Op Diagnosis Codes:     * Ureteral stricture [N13.5]    Procedure(s):  ILEAL CONDUIT urinary diversion (No cystectomy). BILATERAL UTERO-LYSIS    Specimen(s):  ID Type Source Tests Collected by Time Destination   1 : DISTAL RIGHT URETER Tissue Ureter, Right TISSUE EXAM Demetrius Lowe MD 10/24/2024 1110    2 : DISTAL LEFT URETER Tissue Ureter, Left TISSUE EXAM Demetrius Lowe MD 10/24/2024 1142        Estimated Blood Loss:   100 cc    Drains:  Nephrostomy Left 10 Fr. (Active)   Site Assessment Clean;Intact 10/24/24 0648   Collection Container Leg bag 10/24/24 0648   Securement Method Tape 10/24/24 0648   Urine Color Yellow/straw 10/24/24 0648   Number of days: 8       Nephrostomy Right 10.2 Fr. (Active)   Site Assessment Clean;Intact 10/24/24 0647   Collection Container Leg bag 10/24/24 0647   Securement Method Tape 10/24/24 0647   Urine Color Yellow/straw 10/24/24 0647   Number of days: 8       Urostomy Ileal conduit RLQ (Active)   Number of days: 0       Urethral Catheter Double-lumen 24 Fr. (Active)   Number of days: 0       Ureteral Internal Stent Right ureter 6 Fr. (Active)   Number of days: 574       Ureteral Internal Stent Left ureter 7 Fr. (Active)   Number of days: 534       Ureteral Internal Stent Right ureter 7 Fr. (Active)   Number of days: 534       Anesthesia Type:   General    Operative Indications:  Ureteral stricture [N13.5]      Operative Findings:  Very small  atretic right ureter consistent with prior radiation injury.  Left ureter appeared healthy with good urine flow.  Both ureters were sewn into the ileal conduit although the suspicion is that the right kidney likely has poor to minimal function.  Extensive ureterolysis performed.      Complications:   None    Procedure and Technique:  Ragini is a 69-year-old female with a history of endometrial carcinoma.  She underwent ASH/BSO and then was found to have metastatic disease and received wide-field pelvic radiation.  She subsequently developed bilateral distal ureteral strictures.  These were initially managed with indwelling stents, however, this was not effective and the patient developed obstruction with sepsis/fever.  She was ultimately transition to bilateral nephrostomy tubes.  She has been deemed SABAS by gynecology oncology and is currently living with bilateral nephrostomy tubes in place.  The right kidney on CT scan looks relatively atrophic.  Risk and benefits of exploratory laparotomy with ureterolysis and ileal conduit creation were discussed and reviewed.  Informed consent obtained.  The patient was brought to the operating room on October 24, 2024.  After the smooth induction of general endotracheal anesthesia and epidural anesthesia, the patient was placed in a low lithotomy position.  Her genitalia is prepped and draped in sterile fashion.  Intravenous antibiotics were administered.  Subcutaneous heparin was not administered to the patient recently had a epidural placed prior to the start of surgery.  Both nephrostomy tubes were capped.  A timeout was performed with all members of the operative team confirming the patient's identity and procedure to be performed.    A #15 blade was used to make a midline vertical incision from pubic symphysis to just above the umbilicus.  Electrocautery was utilized to dissect through the subcutaneous tissue.  The fascia in the midline was identified and scored.  The  fascia was opened in the midline.  The space of Retzius was developed.  The peritoneal cavity was then entered sharply with Metzenbaum scissors after the peritoneum was elevated.  The incision was opened widely.  A Bookwalter retractor was placed.  The bowels were packed superiorly.  This was somewhat difficult based on distention of the sigmoid colon.  Ultimately with proper retraction we worked into the right lower quadrant after mobilizing the right colon.  We identified the aorta and aortic bifurcation along with the right common/external iliac artery.  We examined the retroperitoneum in this area until we found the right ureter.  This area was quite scarred and inflamed and a thick rind of previously irradiated peritoneum was present at least up to the level of the vessels at the superior aspect of the pelvic brim.  I was able to work proximal to this along the psoas muscle to find the right ureter and dissected free.  A vessel loop was placed in length was obtained proximally well up to the kidney.  There was not much distal length available as there were significant radiation changes noted on the side.  The ureter was clipped and cut.    The same procedure was then performed on the patient's left side.  Again a challenging ureterolysis was performed secondary to prior radiation.  Fortunately on the left side the length of the ureter was much longer than the right and I was able to work below the iliac artery to find good healthy viable ureter.  The ureter was clipped and cut.  At this point the sigmoid colon was elevated and a window beneath the sigmoid mesentery was made over top of the sacral promontory.  The left ureter was brought into the right lower quadrant.    Next attention was focused on the bowel work.  The cecum was further mobilized and the terminal ileum was identified.  We measured back 15 to 20 cm from the terminal ileum and marked to the small bowel with a silk stitch to bernardo the stomal and  of the ileal conduit.  We then measured back about 16 to 17 cm and placed a Vicryl stitch to indicate the blood and.  The bowel was transilluminated.  The mesenteric vessels were identified.  Windows were made in the mesentery and taken with the Enseal to ensure proper blood supply to both the bowel segments as well as the ileal conduit.  This was performed at both the stomal and the bladder and.  The BHAVNA 75 stapler was then fired on the stomal and brought inside of the conduit.  The ileal conduit was then placed below the 2 ends of bowel which were then anastomosed in the standard side-to-side fashion using the BHAVNA 75 stapler.  An additional fire of the BHAVNA 75 across the base of the anastomosis was performed.  The mesenteric trap was closed with silk sutures.  The ileal conduit was placed below the anastomosis.  We first focused our attention on the right side which was the shorter more challenging and atretic side.  I was able to spatulate the right ureter which was very diminutive in its caliber.  A tonsil clamp was utilized through the ileal conduit after it was washed out to place a stent on the right side of the conduit.  The right ureter was spatulated and anastomosed to the ileum with a combination of 3-0 and 4-0 Monocryl sutures.  Once the anastomosis was anchored the stent was passed into the ureter.  The caliber of the ureter was so narrow that I could only pass the stent proximally approximately 7 to 10 cm.  I then remove the wire and completed the anastomosis and secured the stent in place with a chromic stitch on the stomal end.    The left-sided anastomosis with significantly easier.  A tonsil clamp was used to pass a stent on the left side of the conduit and the left ureter was widely spatulated.  It was of normal to dilated caliber.  There was urine leaking from the ureter upon spatulation.  I then anastomose the left ureter to the ileum using a combination of 3-0 and 4-0 Monocryl sutures.  After  the anastomosis was anchored I placed a single-J ureteral stent with a wire.  I then completed the anastomosis over top of the stent.  Both anastomoses were pressure checked with the Asepto and there was no sign of leakage.    The bowels were returned to their natural anatomic position.  The Bookwalter was removed.  A SUSAN drain was placed through a separate stab incision in the left lower quadrant.  The stoma was brought out through a skin opening approximately 2 fingerbreadths below and lateral to the umbilicus in the right mid abdomen.  The stoma was matured with 2-0 Vicryl placing sutures through the skin, seromuscular portion of the small intestine and the mucosa.  Additional interrupted 2-0 Vicryl sutures were placed circumferentially through the skin and mucosa alone to complete the maturation of the stoma.    The abdominal cavity was irrigated.  The ileal conduit was reinspected and was in proper position.  The mesentery was located at 12:00 in the expected anatomic location.  The fascia was closed with running looped PDS.  The subcutaneous tissue was closed with Vicryl.  The skin was closed with staples.    All sponge needle instrument counts were correct at the end of the case.  A stoma appliance was placed over the ileal conduit and connected to gravity drainage.    Overall the patient tolerated the procedure well and there were no complications.  The patient was extubated in the operating room and transferred the PACU in stable condition at the conclusion of the case.    Patient Disposition:  PACU              SIGNATURE: Demetrius Lowe MD  DATE: October 24, 2024  TIME: 12:27 PM

## 2024-10-24 NOTE — ASSESSMENT & PLAN NOTE
Etiology: suspect secondary to hypertension, obstructive uropathy in the setting of bilateral ureteral strictures, underlying glomerular pathology as well as age-related nephron loss  Most recent baseline creatinine 2.0-2.5   Current creatinine postop 2.46  On furosemide 20 mg as needed for leg swelling or weight gain of greater than 2 pounds-is not taking any in quite some time  Plan:  IV fluids per primary team  Avoid nephrotoxins, NSAIDs and limit IV contrast  Avoid hypotension of pertubation's of blood pressure to prevent decreased renal perfusion  Check BMP in am

## 2024-10-24 NOTE — ASSESSMENT & PLAN NOTE
Blood pressure acceptable 136/79  Not on antihypertensive medications at home  Avoid hypotension of pertubation's of blood pressure

## 2024-10-24 NOTE — CONSULTS
Assessment & Plan  CKD (chronic kidney disease) stage 4, GFR 15-29 ml/min ([facility])  Etiology: suspect secondary to hypertension, obstructive uropathy in the setting of bilateral ureteral strictures, underlying glomerular pathology as well as age-related nephron loss  Most recent baseline creatinine 2.0-2.5   Current creatinine postop 2.46  On furosemide 20 mg as needed for leg swelling or weight gain of greater than 2 pounds-is not taking any in quite some time  Plan:  IV fluids per primary team  Avoid nephrotoxins, NSAIDs and limit IV contrast  Avoid hypotension of pertubation's of blood pressure to prevent decreased renal perfusion  Check BMP in am  Benign essential hypertension  Blood pressure acceptable 136/79  Not on antihypertensive medications at home  Avoid hypotension of pertubation's of blood pressure    Anemia due to stage 4 chronic kidney disease  (HCC)  Current hemoglobin 11.7  Previously received IV iron supplementation with oncology  Avoid KENNEY given malignancy  Defer management of anemia to hematology  Bilateral hydronephrosis  Status post ileal conduit urine diversion (no cystectomy) lateral ureterolysis 10/24/2024  Urology following  Endometrial cancer (HCC)  Has been greater than 2 years off treatment per gyn oncology feelsthis is prolonged remission  GYn oncology following     HISTORY OF PRESENT ILLNESS:  Requesting Physician: Demetrius Lowe MD  Reason for Consult: Perioperative optimization to reduce the incidence of acute kidney injury in the setting of CKD    Ragini Melendez is a 69 y.o. female who has PMH of CKD IV, hypertension, thrombocytopenia, chronic pain syndrome, history of uterine cancer status post bilateral uterine obstruction requiring PCNS who presents today for elective ileal conduit urinary diversion.  A renal consultation is requested today perioperative optimization to reduce the incidence of acute kidney injury in the setting of CKD.  Patient reports feeling okay post  procedure no current events..  She reports not taking her as needed furosemide in some time.     PAST MEDICAL HISTORY:  Past Medical History:   Diagnosis Date    Anemia     Anxiety     Cancer (HCC)     ENDOMETRIAL    Chemotherapy induced neutropenia (HCC) 08/30/2019    Chronic kidney disease     CKD (chronic kidney disease) stage 3, GFR 30-59 ml/min (HCC)     COVID     Fall 2022    Depression     DVT (deep venous thrombosis) (HCC) 07/19/2019    RIGHT LEG    Endometrial cancer (HCC) 12/2017    GERD (gastroesophageal reflux disease)     H/O gastric bypass     Hard to intubate     pt denies AS OF 7/25/23    History of chemotherapy     started 12/2021endometrial cancer- done 12/23/22    History of DVT in adulthood     RLE    History of transfusion 04/13/2023    Hyperglycemia     vx type 2 dm -- last assessed 4/1/14; resolved 11/7/17    Hyperlipidemia     Hypertension     in past- no meds at present    Iron deficiency anemia     iron infusions weekly    Iron deficiency anemia secondary to inadequate dietary iron intake 02/08/2023    OAB (overactive bladder)     Port-A-Cath in place     Wears glasses        PAST SURGICAL HISTORY:  Past Surgical History:   Procedure Laterality Date    ABDOMINAL SURGERY      GASTRIC BYPASS; 2001    BREAST BIOPSY Right 06/28/2019    core biopsy; benign    CHOLECYSTECTOMY      at the time of gastric bypass    COLONOSCOPY      CT NEEDLE BIOPSY LYMPH NODE  07/08/2019    FL GUIDED CENTRAL VENOUS ACCESS DEVICE INSERTION  11/12/2019    FL RETROGRADE PYELOGRAM  03/30/2023    FL RETROGRADE PYELOGRAM  05/09/2023    FL RETROGRADE PYELOGRAM  8/1/2023    GASTRIC BYPASS      HYSTERECTOMY Bilateral 2017    total abdominal with salpingo-oophorectomy    IR NEPHROSTOMY TO NEPHROURETERAL STENT  06/09/2022    IR NEPHROSTOMY TO NEPHROURETERAL STENT  12/20/2022    IR NEPHROSTOMY TO NEPHROURETERAL STENT  8/8/2023    IR NEPHROSTOMY TUBE CHECK/CHANGE/REPOSITION/REINSERTION/UPSIZE  05/27/2022    IR NEPHROSTOMY TUBE  CHECK/CHANGE/REPOSITION/REINSERTION/UPSIZE  11/10/2023    IR NEPHROSTOMY TUBE CHECK/CHANGE/REPOSITION/REINSERTION/UPSIZE  2/9/2024    IR NEPHROSTOMY TUBE CHECK/CHANGE/REPOSITION/REINSERTION/UPSIZE  4/12/2024    IR NEPHROSTOMY TUBE CHECK/CHANGE/REPOSITION/REINSERTION/UPSIZE  7/12/2024    IR NEPHROSTOMY TUBE CHECK/CHANGE/REPOSITION/REINSERTION/UPSIZE  10/16/2024    IR NEPHROSTOMY TUBE PLACEMENT  07/26/2019    IR NEPHROSTOMY TUBE PLACEMENT  05/27/2023    IR NEPHROURETERAL STENT CHECK/CHANGE/REPOSITION  04/06/2021    IR NEPHROURETERAL STENT CHECK/CHANGE/REPOSITION  06/04/2021    IR NEPHROURETERAL STENT CHECK/CHANGE/REPOSITION  09/03/2021    IR NEPHROURETERAL STENT CHECK/CHANGE/REPOSITION  12/07/2021    IR NEPHROURETERAL STENT CHECK/CHANGE/REPOSITION  03/08/2022    IR NEPHROURETERAL STENT CHECK/CHANGE/REPOSITION  05/10/2022    IR NEPHROURETERAL STENT CHECK/CHANGE/REPOSITION  09/19/2022    IR PICC LINE  09/27/2019    IR PORT PLACEMENT  07/26/2019    IR PORT PLACEMENT      IR PORT REMOVAL  09/20/2019    OOPHORECTOMY Bilateral 2017    NC CYSTO BLADDER W/URETERAL CATHETERIZATION Right 03/30/2023    Procedure: CYSTOSCOPY RETROGRADE PYELOGRAM WITH exchange of STENT URETERAL;  Surgeon: Demetrius Lowe MD;  Location: [facility];  Service: Urology    NC CYSTO BLADDER W/URETERAL CATHETERIZATION Bilateral 05/09/2023    Procedure: CYSTOSCOPY, BILATERAL RETROGRADE PYELOGRAM, WITH LEFT INSERTION STENT URETERAL, RIGHT URTERAL STENT EXCHANGE;  Surgeon: Nicola Hannah MD;  Location: [facility];  Service: Urology    NC CYSTO BLADDER W/URETERAL CATHETERIZATION Bilateral 8/1/2023    Procedure: CYSTOSCOPY BILATERAL RETROGRADE  WITH REMOVAL BILATERAL  STENT URETERAL;  Surgeon: Demetrius Lowe MD;  Location: [facility];  Service: Urology    NC CYSTO W/INSERT URETERAL STENT Right 03/30/2023    Procedure: EXCHANGE STENT URETERAL;  Surgeon: Demetrius Lowe MD;  Location: [facility];  Service: Urology    NC INSJ TUNNELED  CTR VAD W/SUBQ PORT AGE 5 YR/> Left 11/12/2019    Procedure: INSERTION VENOUS PORT ( PORT-A-CATH) IR;  Surgeon: Edilberto Guzman DO;  [facility];  Service: Interventional Radiology    CT LAPS ABD PRTM&OMENTUM DX W/WO SPEC BR/WA SPX N/A 12/19/2017    Procedure: LAPAROSCOPY DIAGNOSTIC;  Surgeon: Evgeny So MD;  [facility];  Service: Gynecology Oncology    CT LAPS W/RAD HYST W/BILAT LMPHADEC RMVL TUBE/OVARY N/A 12/19/2017    Procedure: HYSTERECTOMY LAPAROSCOPIC TOTAL (LTH) W/ ROBOTICS; BILATERAL SALPINGOOPHERECTOMY; LYMPH NODE DISSECTION; lysis of adhesions;  Surgeon: Evgeny So MD;  Location: [facility];  Service: Gynecology Oncology    REMOVAL URETERAL STENT Bilateral 8/1/2023    Procedure: REMOVAL STENT URETERAL;  Surgeon: Demetrius Lowe MD;  [facility];  Service: Urology    TONSILLECTOMY      US GUIDED BREAST BIOPSY RIGHT COMPLETE Right 06/28/2019       ALLERGIES:  Allergies   Allergen Reactions    Cephalosporins Rash     Which Cephalosporin reaction was to not specified; however, has tolerated Amoxicillin, Cefazolin, and Cefepime       SOCIAL HISTORY:  Social History     Substance and Sexual Activity   Alcohol Use Never     Social History     Substance and Sexual Activity   Drug Use Never     Social History     Tobacco Use   Smoking Status Never   Smokeless Tobacco Never       FAMILY HISTORY:  Family History   Problem Relation Age of Onset    Hyperlipidemia Mother     Heart disease Mother     Ovarian cancer Mother 50    Colon cancer Mother     Lymphoma Father     Breast cancer Sister 63    No Known Problems Brother     No Known Problems Brother     No Known Problems Maternal Grandmother     Bone cancer Maternal Grandfather     Uterine cancer Paternal Grandmother     No Known Problems Paternal Grandfather     No Known Problems Maternal Aunt     No Known Problems Maternal Aunt     No Known Problems Maternal Aunt     No Known Problems Maternal Aunt     No Known  Problems Paternal Aunt     No Known Problems Paternal Aunt     No Known Problems Paternal Aunt     No Known Problems Paternal Aunt        MEDICATIONS:    Current Facility-Administered Medications:     HYDROmorphone (DILAUDID) injection 0.5 mg, 0.5 mg, Intravenous, Q1H PRN, Vaishali Amanda MD    naloxone (NARCAN) injection 0.1 mg, 0.1 mg, Intravenous, Q1MIN PRN, Vaishali Amanda MD    ondansetron (ZOFRAN) injection 4 mg, 4 mg, Intravenous, Q4H PRN, Vaishali Amanda MD    ropivacaine 0.1% and fentaNYL 2 mcg/mL PCEA, , Epidural, Continuous, Vaishali Amanda MD, Rate Verify at 10/24/24 1410    sodium chloride 0.9 % infusion, 125 mL/hr, Intravenous, Continuous, Demetrius Lowe MD      REVIEW OF SYSTEMS:  Patient seen and examined at bedside.  Waking up from anesthesia postprocedure and offering no complaints  Review of Systems   Constitutional: Negative.  Negative for activity change, appetite change, chills, diaphoresis, fatigue and fever.   HENT: Negative.  Negative for congestion and facial swelling.    Respiratory: Negative.     Cardiovascular: Negative.    Gastrointestinal: Negative.    Endocrine: Negative.    Genitourinary: Negative.    Musculoskeletal: Negative.    Skin: Negative.    Allergic/Immunologic: Negative.    Neurological: Negative.    Hematological: Negative.    Psychiatric/Behavioral: Negative.           PHYSICAL EXAM:  Current weight: Weight - Scale: 71.7 kg (158 lb)  Vitals:    10/24/24 1300 10/24/24 1315 10/24/24 1330 10/24/24 1400   BP: 105/55 99/55 97/51 122/59   BP Location: Left arm Left arm Left arm    Pulse: 86 82 81 83   Resp: 16 17 18 16   Temp:       TempSrc:       SpO2: 97% 96% 95% 94%   Weight:       Height:           Physical Exam  Vitals and nursing note reviewed.   HENT:      Head: Normocephalic and atraumatic.      Nose: Nose normal.      Mouth/Throat:      Mouth: Mucous membranes are dry.      Pharynx: Oropharynx is clear.   Eyes:      Extraocular Movements: Extraocular movements intact.  "     Conjunctiva/sclera: Conjunctivae normal.   Cardiovascular:      Rate and Rhythm: Normal rate and regular rhythm.      Pulses: Normal pulses.      Heart sounds: Normal heart sounds.   Pulmonary:      Effort: Pulmonary effort is normal.      Breath sounds: Normal breath sounds.   Abdominal:      General: Bowel sounds are normal.      Palpations: Abdomen is soft.   Musculoskeletal:         General: Normal range of motion.      Cervical back: Normal range of motion and neck supple.   Skin:     General: Skin is warm and dry.   Neurological:      General: No focal deficit present.      Mental Status: She is alert and oriented to person, place, and time.   Psychiatric:         Mood and Affect: Mood normal.         Behavior: Behavior normal.           Lab Results:   Results from last 7 days   Lab Units 10/24/24  1328   WBC Thousand/uL 23.28*   HEMOGLOBIN g/dL 11.7   HEMATOCRIT % 37.4   PLATELETS Thousands/uL 286   SODIUM mmol/L 134*   POTASSIUM mmol/L 4.5   CHLORIDE mmol/L 103   CO2 mmol/L 20*   BUN mg/dL 42*   CREATININE mg/dL 2.46*   CALCIUM mg/dL 8.2*       Medical records through [facility] and Care Everywhere has been reviewed for this patient encounter    Portions of the record may have been created with voice recognition software. Occasional wrong word or \"sound a like\" substitutions may have occurred due to the inherent limitations of voice recognition software. Read the chart carefully and recognize,   "

## 2024-10-24 NOTE — ASSESSMENT & PLAN NOTE
Current hemoglobin 11.7  Previously received IV iron supplementation with oncology  Avoid KENNEY given malignancy  Defer management of anemia to hematology

## 2024-10-24 NOTE — ANESTHESIA PREPROCEDURE EVALUATION
Procedure:  ILEAL CONDUIT urinary diversion (No cystectomy) (Bladder)    Relevant Problems   CARDIO   (+) Benign essential hypertension   (+) Left-sided chest wall pain      /RENAL   (+) Acute on chronic kidney disease   (+) Hypertensive CKD (chronic kidney disease)   (+) Nephrotoxicity   (+) Stage 4 chronic kidney disease (HCC)      GYN   (+) Endometrial cancer (HCC)      HEMATOLOGY   (+) Anemia   (+) Thrombocytopenia, unspecified (HCC)      MUSCULOSKELETAL   (+) Acute left-sided low back pain without sciatica   (+) Bilateral piriformis syndrome   (+) Myofascial pain syndrome      NEURO/PSYCH   (+) Chronic pain syndrome   (+) Depression, recurrent (HCC)   (+) Major depression, chronic   (+) Myofascial pain syndrome        Physical Exam    Airway    Mallampati score: II  TM Distance: >3 FB  Neck ROM: full     Dental       Cardiovascular      Pulmonary      Other Findings  post-pubertal.      Anesthesia Plan  ASA Score- 3     Anesthesia Type- general with ASA Monitors.         Additional Monitors: arterial line.    Airway Plan: ETT.    Comment: Recent labs personally reviewed:  Lab Results       Component                Value               Date                       WBC                      8.48                10/14/2024                 WBC                      8.66                10/14/2024                 HGB                      10.9 (L)            10/14/2024                 HGB                      11.0 (L)            10/14/2024                 PLT                      266                 10/14/2024                 PLT                      262                 10/14/2024            Lab Results       Component                Value               Date                       NA                       141                 10/27/2017                 K                        4.1                 10/14/2024                 K                        4.1                 10/14/2024                 BUN                      51  (H)              10/14/2024                 BUN                      50 (H)              10/14/2024                 CREATININE               2.00 (H)            10/14/2024                 CREATININE               1.99 (H)            10/14/2024                 GLUCOSE                  219 (H)             12/19/2017            Lab Results       Component                Value               Date                       PTT                      30                  10/14/2024             Lab Results       Component                Value               Date                       INR                      0.93                10/14/2024              Blood type O      I, Vaishali Amanda MD, have personally seen and evaluated the patient prior to anesthetic care.  I have reviewed the pre-anesthetic record, medical history, allergies, medications and any other medical records if appropriate to the anesthetic care.  If a CRNA is involved in the case, I have reviewed the CRNA assessment, if present, and agree. I consented the patient for general anesthesia with appropriate airway support as indicated. We reviewed the risks associated including PONV, sore throat, allergic reaction to anesthetics and management plan to address these issues. We discussed the indication and risks associated with any invasive monitors that would be placed. We discussed post op pain control and expectations. We discussed rare complications including hypoxia, perioperative cardiac and neurologic events, and death based on the patient's baseline risk. All questions and concerns were addressed.   .       Plan Factors-Exercise tolerance (METS): >4 METS.    Chart reviewed. EKG reviewed. Imaging results reviewed. Existing labs reviewed. Patient summary reviewed.    Patient is not a current smoker.  Patient did not smoke on day of surgery.    Obstructive sleep apnea risk education given perioperatively.        Induction- intravenous.    Postoperative Plan-          Informed Consent- Anesthetic plan and risks discussed with patient.  I personally reviewed this patient with the CRNA. Discussed and agreed on the Anesthesia Plan with the CRNA..

## 2024-10-24 NOTE — ANESTHESIA POSTPROCEDURE EVALUATION
Post-Op Assessment Note    CV Status:  Stable    Pain management: adequate       Mental Status:  Alert and awake   Hydration Status:  Euvolemic   PONV Controlled:  Controlled   Airway Patency:  Patent     Post Op Vitals Reviewed: Yes    No anethesia notable event occurred.    Staff: Anesthesiologist           Last Filed PACU Vitals:  Vitals Value Taken Time   Temp 97.5 °F (36.4 °C) 10/24/24 1256   Pulse 83 10/24/24 1400   /59 10/24/24 1400   Resp 16 10/24/24 1400   SpO2 94 % 10/24/24 1400       Modified Diana:  Activity: 2 (10/24/2024  2:00 PM)  Respiration: 2 (10/24/2024  2:00 PM)  Circulation: 2 (10/24/2024  2:00 PM)  Consciousness: 1 (10/24/2024  2:00 PM)  Oxygen Saturation: 2 (10/24/2024  2:00 PM)  Modified Diana Score: 9 (10/24/2024  2:00 PM)

## 2024-10-24 NOTE — H&P
For questions/concerns on this patient, please reach out to the following:  [facility] G[facility]-GynOnc- Resident/AP      H&P Exam - Gynecology Oncology  Ragini Melendez 69 y.o. female MRN: [id number]  Unit/Bed#: OR POOL Encounter: [id number]        Assessment & Plan   68yo with h/o uterine cancer c/b bilateral ureteral obstruction requiring PCNS presents for possible ileal conduit.  Pt is now >2 years off treatment and in prolonged remission, likely cure. Desires definitive management for PCN removal. Risks and benefits of procedures discussed.  Patient understands potential for aborting procedure and need for continued PCNs.   Proceed as planned        History of Present Illness     HPI:  Ragini Melendez is a 69 y.o. female who presents for surgical intervention. No new complaints.     Oncology History   Endometrial cancer (HCC)   11/17/2017 Initial Diagnosis    Endometrial cancer (HCC)     11/17/2017 Biopsy    ENDOMETRIAL BIOPSY: WELL DIFFERENTIATED ENDOMETRIAL ADENOCARCINOMA (FIGO I) WITH FOCALMUCINOUS FEATURES.    Part B: ENDOCERVICAL POLYP:BENIGN ENDOCERVICAL      12/19/2017 Surgery    Robotic assisted total laparoscopic hysterectomy with bilateral salpingo-oophorectomy and sentinel bilateral pelvic lymph node dissection. 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 (4.4 x 3.2 cm tumor, 9.4/15.4mm invasion, NO LVSI, washings revealed atypical cellular changes)     12/19/2017 Genetic Testing    Morrison testing negative     6/28/2019 Biopsy    A. Breast, Right, US BX Right Breast 1000 4 cmfn:  - Benign breast tissue with focal histiocytic aggregate.  See comment.  - Negative for atypia and in-situ or invasive carcinoma.     7/8/2019 Recurrence    Presented with right lower extremity DVT and CT demonstrating right pelvic sidewall mass with venous, ureteral and nerve compression causing significant neuropathic pain.      Biopsy:  Lymph Node, Right pelvic lymph node x3:  - High-grade adenocarcinoma.       7/30/2019 - 1/6/2020 Chemotherapy    Taxol 175 mg/m2 and carboplatin AUC 6 every 21 days. Dose was reduced to taxol 135 mg/m2 and carboplatin AUC 5. Completed 6 cycles.  Treatment protracted due to multiple hospital admissions.     2/24/2020 - 4/13/2020 Radiation    adjuvant external beam radiation therapy to the whole pelvis to 4500 cGy followed by boost to gross disease of an additional 2200 cGy     11/23/2020 Progression    New necrotic adenopathy in the retroperitoneum on CT.     12/21/2020 - 12/5/2022 Chemotherapy    Began chemotherapy with rucaparib, atezolizumab, and bevacizumab as per [facility] clinical trial.    Rucaparib held cycle 28 secondary to fatigue  Avastin held since cycle 30 for elevated UPC  Treatment held for one cycle after cycle 31 for mucositis/fatigue           Review of Systems   Constitutional: Negative.    HENT: Negative.     Eyes: Negative.    Respiratory: Negative.     Cardiovascular: Negative.    Gastrointestinal: Negative.    Endocrine: Negative.    Genitourinary: Negative.    Musculoskeletal: Negative.    Neurological: Negative.    Psychiatric/Behavioral: Negative.         Historical Information   Past Medical History:   Diagnosis Date    Anemia     Anxiety     Cancer (HCC)     ENDOMETRIAL    Chemotherapy induced neutropenia (HCC) 08/30/2019    Chronic kidney disease     CKD (chronic kidney disease) stage 3, GFR 30-59 ml/min (HCC)     COVID     Fall 2022    Depression     DVT (deep venous thrombosis) (HCC) 07/19/2019    RIGHT LEG    Endometrial cancer (HCC) 12/2017    GERD (gastroesophageal reflux disease)     H/O gastric bypass     Hard to intubate     pt denies AS OF 7/25/23    History of chemotherapy     started 12/2021endometrial cancer- done 12/23/22    History of DVT in adulthood     RLE    History of transfusion 04/13/2023    Hyperglycemia     vx type 2 dm -- last assessed 4/1/14; resolved 11/7/17    Hyperlipidemia     Hypertension     in past- no meds at present    Iron  deficiency anemia     iron infusions weekly    Iron deficiency anemia secondary to inadequate dietary iron intake 02/08/2023    OAB (overactive bladder)     Port-A-Cath in place     Wears glasses      Past Surgical History:   Procedure Laterality Date    ABDOMINAL SURGERY      GASTRIC BYPASS; 2001    BREAST BIOPSY Right 06/28/2019    core biopsy; benign    CHOLECYSTECTOMY      at the time of gastric bypass    COLONOSCOPY      CT NEEDLE BIOPSY LYMPH NODE  07/08/2019    FL GUIDED CENTRAL VENOUS ACCESS DEVICE INSERTION  11/12/2019    FL RETROGRADE PYELOGRAM  03/30/2023    FL RETROGRADE PYELOGRAM  05/09/2023    FL RETROGRADE PYELOGRAM  8/1/2023    GASTRIC BYPASS      HYSTERECTOMY Bilateral 2017    total abdominal with salpingo-oophorectomy    IR NEPHROSTOMY TO NEPHROURETERAL STENT  06/09/2022    IR NEPHROSTOMY TO NEPHROURETERAL STENT  12/20/2022    IR NEPHROSTOMY TO NEPHROURETERAL STENT  8/8/2023    IR NEPHROSTOMY TUBE CHECK/CHANGE/REPOSITION/REINSERTION/UPSIZE  05/27/2022    IR NEPHROSTOMY TUBE CHECK/CHANGE/REPOSITION/REINSERTION/UPSIZE  11/10/2023    IR NEPHROSTOMY TUBE CHECK/CHANGE/REPOSITION/REINSERTION/UPSIZE  2/9/2024    IR NEPHROSTOMY TUBE CHECK/CHANGE/REPOSITION/REINSERTION/UPSIZE  4/12/2024    IR NEPHROSTOMY TUBE CHECK/CHANGE/REPOSITION/REINSERTION/UPSIZE  7/12/2024    IR NEPHROSTOMY TUBE CHECK/CHANGE/REPOSITION/REINSERTION/UPSIZE  10/16/2024    IR NEPHROSTOMY TUBE PLACEMENT  07/26/2019    IR NEPHROSTOMY TUBE PLACEMENT  05/27/2023    IR NEPHROURETERAL STENT CHECK/CHANGE/REPOSITION  04/06/2021    IR NEPHROURETERAL STENT CHECK/CHANGE/REPOSITION  06/04/2021    IR NEPHROURETERAL STENT CHECK/CHANGE/REPOSITION  09/03/2021    IR NEPHROURETERAL STENT CHECK/CHANGE/REPOSITION  12/07/2021    IR NEPHROURETERAL STENT CHECK/CHANGE/REPOSITION  03/08/2022    IR NEPHROURETERAL STENT CHECK/CHANGE/REPOSITION  05/10/2022    IR NEPHROURETERAL STENT CHECK/CHANGE/REPOSITION  09/19/2022    IR PICC LINE  09/27/2019    IR PORT PLACEMENT   2019    IR PORT PLACEMENT      IR PORT REMOVAL  2019    OOPHORECTOMY Bilateral 2017    KY CYSTO BLADDER W/URETERAL CATHETERIZATION Right 2023    Procedure: CYSTOSCOPY RETROGRADE PYELOGRAM WITH exchange of STENT URETERAL;  Surgeon: Demetrius Lowe MD;  [facility]: [facility];  Service: Urology    KY CYSTO BLADDER W/URETERAL CATHETERIZATION Bilateral 2023    Procedure: CYSTOSCOPY, BILATERAL RETROGRADE PYELOGRAM, WITH LEFT INSERTION STENT URETERAL, RIGHT URTERAL STENT EXCHANGE;  Surgeon: Nicola Hannah MD;  [facility];  Service: Urology    KY CYSTO BLADDER W/URETERAL CATHETERIZATION Bilateral 2023    Procedure: CYSTOSCOPY BILATERAL RETROGRADE  WITH REMOVAL BILATERAL  STENT URETERAL;  Surgeon: Demetrius Lowe MD;  [facility];  Service: Urology    KY CYSTO W/INSERT URETERAL STENT Right 2023    Procedure: EXCHANGE STENT URETERAL;  Surgeon: Demetrius Lowe MD;  [facility];  Service: Urology    KY INSJ TUNNELED CTR VAD W/SUBQ PORT AGE 5 YR/> Left 2019    Procedure: INSERTION VENOUS PORT ( PORT-A-CATH) IR;  Surgeon: Edilberto Guzman DO;  [facility];  Service: Interventional Radiology    KY LAPS ABD PRTM&OMENTUM DX W/WO SPEC BR/WA SPX N/A 2017    Procedure: LAPAROSCOPY DIAGNOSTIC;  Surgeon: Evgeny So MD;  [facility];  Service: Gynecology Oncology    KY LAPS W/RAD HYST W/BILAT LMPHADEC RMVL TUBE/OVARY N/A 2017    Procedure: HYSTERECTOMY LAPAROSCOPIC TOTAL (LTH) W/ ROBOTICS; BILATERAL SALPINGOOPHERECTOMY; LYMPH NODE DISSECTION; lysis of adhesions;  Surgeon: Evgeny So MD;  [facility];  Service: Gynecology Oncology    REMOVAL URETERAL STENT Bilateral 2023    Procedure: REMOVAL STENT URETERAL;  Surgeon: Demetrius Lowe MD;  [facility];  Service: Urology    TONSILLECTOMY      US GUIDED BREAST BIOPSY RIGHT COMPLETE Right 2019     OB History    Para  Term  AB Living       0         SAB IAB Ectopic Multiple Live Births                 Family History   Problem Relation Age of Onset    Hyperlipidemia Mother     Heart disease Mother     Ovarian cancer Mother 50    Colon cancer Mother     Lymphoma Father     Breast cancer Sister 63    No Known Problems Brother     No Known Problems Brother     No Known Problems Maternal Grandmother     Bone cancer Maternal Grandfather     Uterine cancer Paternal Grandmother     No Known Problems Paternal Grandfather     No Known Problems Maternal Aunt     No Known Problems Maternal Aunt     No Known Problems Maternal Aunt     No Known Problems Maternal Aunt     No Known Problems Paternal Aunt     No Known Problems Paternal Aunt     No Known Problems Paternal Aunt     No Known Problems Paternal Aunt      Social History   Social History     Substance and Sexual Activity   Alcohol Use Never     Social History     Substance and Sexual Activity   Drug Use Never     Social History     Tobacco Use   Smoking Status Never   Smokeless Tobacco Never       Meds/Allergies     Medications Prior to Admission:     ARIPiprazole (ABILIFY) 20 MG tablet    calcitriol (ROCALTROL) 0.25 mcg capsule    calcium acetate (PHOSLO) capsule    Calcium-Magnesium-Vitamin D (CALCIUM 500 PO)    cetirizine (ZyrTEC) 10 mg tablet    cholecalciferol (VITAMIN D3) 1,000 units tablet    folic acid (FOLVITE) 1 mg tablet    Multiple Vitamin (MULTIVITAMIN) tablet    omeprazole (PriLOSEC) 20 mg delayed release capsule    oxybutynin (DITROPAN XL) 15 MG 24 hr tablet    senna (SENOKOT) 8.6 MG tablet    sodium bicarbonate 650 mg tablet    sulfamethoxazole-trimethoprim (BACTRIM DS) 800-160 mg per tablet    venlafaxine (EFFEXOR-XR) 75 mg 24 hr capsule    BD PosiFlush 0.9 % SOLN    Cyanocobalamin (VITAMIN B12 PO)    fluticasone (FLONASE) 50 mcg/act nasal spray    furosemide (LASIX) 20 mg tablet    sodium chloride, PF, 0.9 %    sodium chloride, PF, 0.9 %    sodium chloride, PF,  "0.9 %    Vibegron (Gemtesa) 75 MG TABS  Allergies   Allergen Reactions    Cephalosporins Rash     Which Cephalosporin reaction was to not specified; however, has tolerated Amoxicillin, Cefazolin, and Cefepime       Objective     /57   Pulse 61   Temp 97.8 °F (36.6 °C) (Temporal)   Resp 22   Ht 4' 9\" (1.448 m)   Wt 71.7 kg (158 lb)   LMP  (LMP Unknown)   SpO2 95%   BMI 34.19 kg/m²     No intake/output data recorded.  No intake/output data recorded.    Lab Results   Component Value Date    WBC 8.48 10/14/2024    WBC 8.66 10/14/2024    HGB 10.9 (L) 10/14/2024    HGB 11.0 (L) 10/14/2024    HCT 34.7 (L) 10/14/2024    HCT 35.3 10/14/2024    MCV 93 10/14/2024    MCV 94 10/14/2024     10/14/2024     10/14/2024       Lab Results   Component Value Date    GLUCOSE 219 (H) 12/19/2017    CALCIUM 8.7 10/14/2024    CALCIUM 8.7 10/14/2024     10/27/2017    K 4.1 10/14/2024    K 4.1 10/14/2024    CO2 23 10/14/2024    CO2 23 10/14/2024     10/14/2024     10/14/2024    BUN 51 (H) 10/14/2024    BUN 50 (H) 10/14/2024    CREATININE 2.00 (H) 10/14/2024    CREATININE 1.99 (H) 10/14/2024       Physical Exam  HENT:      Head: Normocephalic and atraumatic.      Nose: Nose normal.   Cardiovascular:      Rate and Rhythm: Normal rate and regular rhythm.   Pulmonary:      Effort: Pulmonary effort is normal.   Abdominal:      General: There is no distension.      Palpations: Abdomen is soft. There is no mass.   Genitourinary:     Comments: defer  Musculoskeletal:         General: No swelling. Normal range of motion.      Cervical back: Normal range of motion.   Skin:     General: Skin is warm and dry.   Neurological:      General: No focal deficit present.      Mental Status: She is alert.   Psychiatric:         Mood and Affect: Mood normal.         Imaging: Results Review Statement: No pertinent imaging studies reviewed.  EKG, Pathology, and Other Studies: Results Review Statement: No pertinent " imaging studies reviewed.      Code Status: Prior    Beth Rodriguez MD  10/24/2024  7:16 AM

## 2024-10-24 NOTE — ASSESSMENT & PLAN NOTE
Status post ileal conduit urine diversion (no cystectomy) lateral ureterolysis 10/24/2024  Urology following

## 2024-10-24 NOTE — INTERVAL H&P NOTE
H&P reviewed. After examining the patient I find no changes in the patients condition since the H&P had been written.    Vitals:    10/24/24 0631   BP: 105/57   Pulse: 61   Resp: 22   Temp: 97.8 °F (36.6 °C)   SpO2: 95%     Ragini is a 69-year-old female with a prior history of endometrial carcinoma previously treated with surgery as well as radiation.  She developed bilateral distal ureteral stricture secondary to the radiation.  Over the last few years while monitoring her cancer disease status which is now SABAS and has been so, in current state she is managed with bilateral nephrostomy tubes.  She is currently nephrostomy tube dependent.  After lengthy discussion with the patient in the outpatient setting she wishes to proceed with urinary diversion with an ileal conduit.  We again today addressed the risk and benefits associated with this procedure.  Risk discussed included, but were not limited to, bleeding, infection, injury to surrounding tissue, inability to perform the operation as discussed with the patient secondary to adhesions and scarring.  We discussed injury to surrounding tissue such as bowel and blood vessels.  We discussed that if I am not able to gain sufficient length on the ureters which appear to be strictured above the level of the pelvic brim that we may simply tie off the ureters and that she may be nephrostomy tube dependent for life.  She also understands there is a low probability that we may be able to reimplant both ureters into the native bladder.  This may be quite challenging based on the length of healthy ureter available to work with as well as an inability to mobilize the bladder from prior pelvic radiation.  She also understands that if I am able to perform an ileal conduit but there are complications associated with it such as anastomotic strictures and progression of renal compromise.  She understands that an anastomotic stricture would likely be managed with either a  nephrostomy tube which she has in current state or possibly an internal single-J stent.  We also discussed complications in the perioperative period such as bleeding, infection, and blood clots for which she has a history of.  We discussed the risk of stroke, heart attack, and even death from major surgery.  The patient understands all of the above and wishes to proceed with surgery today.

## 2024-10-24 NOTE — ANESTHESIA POSTPROCEDURE EVALUATION
Post-Op Assessment Note    CV Status:  Stable  Pain Score: 0    Pain management: adequate      Post-op block assessment: no complications   Mental Status:  Alert and awake   Hydration Status:  Euvolemic   PONV Controlled:  Controlled   Airway Patency:  Patent     Post Op Vitals Reviewed: Yes    No anethesia notable event occurred.    Staff: CRNA           Last Filed PACU Vitals:  Vitals Value Taken Time   Temp 97.5 °F (36.4 °C) 10/24/24 1256   Pulse 84 10/24/24 1256   /62 10/24/24 1256   Resp 17 10/24/24 1256   SpO2 100 % 10/24/24 1256       Modified Diana:  No data recorded

## 2024-10-25 LAB
ABO GROUP BLD BPU: NORMAL
ABO GROUP BLD BPU: NORMAL
ANION GAP SERPL CALCULATED.3IONS-SCNC: 9 MMOL/L (ref 4–13)
BPU ID: NORMAL
BPU ID: NORMAL
BUN SERPL-MCNC: 40 MG/DL (ref 5–25)
CALCIUM SERPL-MCNC: 7.8 MG/DL (ref 8.4–10.2)
CHLORIDE SERPL-SCNC: 104 MMOL/L (ref 96–108)
CO2 SERPL-SCNC: 20 MMOL/L (ref 21–32)
CREAT SERPL-MCNC: 2.27 MG/DL (ref 0.6–1.3)
CROSSMATCH: NORMAL
CROSSMATCH: NORMAL
ERYTHROCYTE [DISTWIDTH] IN BLOOD BY AUTOMATED COUNT: 13 % (ref 11.6–15.1)
GFR SERPL CREATININE-BSD FRML MDRD: 21 ML/MIN/1.73SQ M
GLUCOSE SERPL-MCNC: 174 MG/DL (ref 65–140)
HCT VFR BLD AUTO: 31.7 % (ref 34.8–46.1)
HGB BLD-MCNC: 10.1 G/DL (ref 11.5–15.4)
MCH RBC QN AUTO: 29.2 PG (ref 26.8–34.3)
MCHC RBC AUTO-ENTMCNC: 31.9 G/DL (ref 31.4–37.4)
MCV RBC AUTO: 92 FL (ref 82–98)
PLATELET # BLD AUTO: 255 THOUSANDS/UL (ref 149–390)
PMV BLD AUTO: 10.2 FL (ref 8.9–12.7)
POTASSIUM SERPL-SCNC: 4.7 MMOL/L (ref 3.5–5.3)
RBC # BLD AUTO: 3.46 MILLION/UL (ref 3.81–5.12)
SODIUM SERPL-SCNC: 133 MMOL/L (ref 135–147)
UNIT DISPENSE STATUS: NORMAL
UNIT DISPENSE STATUS: NORMAL
UNIT PRODUCT CODE: NORMAL
UNIT PRODUCT CODE: NORMAL
UNIT PRODUCT VOLUME: 350 ML
UNIT PRODUCT VOLUME: 350 ML
UNIT RH: NORMAL
UNIT RH: NORMAL
WBC # BLD AUTO: 12.1 THOUSAND/UL (ref 4.31–10.16)

## 2024-10-25 PROCEDURE — 85027 COMPLETE CBC AUTOMATED: CPT | Performed by: UROLOGY

## 2024-10-25 PROCEDURE — 99223 1ST HOSP IP/OBS HIGH 75: CPT | Performed by: ANESTHESIOLOGY

## 2024-10-25 PROCEDURE — 80048 BASIC METABOLIC PNL TOTAL CA: CPT | Performed by: UROLOGY

## 2024-10-25 PROCEDURE — 99024 POSTOP FOLLOW-UP VISIT: CPT | Performed by: UROLOGY

## 2024-10-25 PROCEDURE — 99232 SBSQ HOSP IP/OBS MODERATE 35: CPT | Performed by: INTERNAL MEDICINE

## 2024-10-25 RX ADMIN — DEXTROSE, SODIUM CHLORIDE, SODIUM LACTATE, POTASSIUM CHLORIDE, AND CALCIUM CHLORIDE 125 ML/HR: 5; .6; .31; .03; .02 INJECTION, SOLUTION INTRAVENOUS at 13:19

## 2024-10-25 RX ADMIN — CALCITRIOL CAPSULES 0.25 MCG 0.25 MCG: 0.25 CAPSULE ORAL at 08:53

## 2024-10-25 RX ADMIN — HEPARIN SODIUM 5000 UNITS: 5000 INJECTION INTRAVENOUS; SUBCUTANEOUS at 08:55

## 2024-10-25 RX ADMIN — ROPIVACAINE HYDROCHLORIDE: 5 INJECTION EPIDURAL; INFILTRATION; PERINEURAL at 00:42

## 2024-10-25 RX ADMIN — VITAM B12 50 MCG: 100 TAB at 08:53

## 2024-10-25 RX ADMIN — PANTOPRAZOLE SODIUM 20 MG: 20 TABLET, DELAYED RELEASE ORAL at 05:24

## 2024-10-25 RX ADMIN — ACETAMINOPHEN 650 MG: 325 TABLET, FILM COATED ORAL at 05:24

## 2024-10-25 RX ADMIN — ACETAMINOPHEN 650 MG: 325 TABLET, FILM COATED ORAL at 13:05

## 2024-10-25 RX ADMIN — DEXTROSE, SODIUM CHLORIDE, SODIUM LACTATE, POTASSIUM CHLORIDE, AND CALCIUM CHLORIDE 125 ML/HR: 5; .6; .31; .03; .02 INJECTION, SOLUTION INTRAVENOUS at 17:56

## 2024-10-25 RX ADMIN — CALCIUM ACETATE 667 MG: 667 CAPSULE ORAL at 08:54

## 2024-10-25 RX ADMIN — DEXTROSE, SODIUM CHLORIDE, SODIUM LACTATE, POTASSIUM CHLORIDE, AND CALCIUM CHLORIDE 125 ML/HR: 5; .6; .31; .03; .02 INJECTION, SOLUTION INTRAVENOUS at 08:54

## 2024-10-25 RX ADMIN — CALCIUM ACETATE 667 MG: 667 CAPSULE ORAL at 13:05

## 2024-10-25 RX ADMIN — DOCUSATE SODIUM 100 MG: 100 CAPSULE, LIQUID FILLED ORAL at 17:55

## 2024-10-25 RX ADMIN — DOCUSATE SODIUM 100 MG: 100 CAPSULE, LIQUID FILLED ORAL at 08:53

## 2024-10-25 RX ADMIN — ACETAMINOPHEN 650 MG: 325 TABLET, FILM COATED ORAL at 17:55

## 2024-10-25 RX ADMIN — ARIPIPRAZOLE 20 MG: 10 TABLET ORAL at 08:53

## 2024-10-25 RX ADMIN — Medication 4000 UNITS: at 08:53

## 2024-10-25 RX ADMIN — ROPIVACAINE HYDROCHLORIDE: 5 INJECTION EPIDURAL; INFILTRATION; PERINEURAL at 15:13

## 2024-10-25 RX ADMIN — FOLIC ACID 1 MG: 1 TABLET ORAL at 21:48

## 2024-10-25 RX ADMIN — LORATADINE 10 MG: 10 TABLET ORAL at 08:53

## 2024-10-25 RX ADMIN — VENLAFAXINE HYDROCHLORIDE 37.5 MG: 37.5 CAPSULE, EXTENDED RELEASE ORAL at 08:53

## 2024-10-25 RX ADMIN — CALCIUM ACETATE 667 MG: 667 CAPSULE ORAL at 17:55

## 2024-10-25 RX ADMIN — B-COMPLEX W/ C & FOLIC ACID TAB 1 TABLET: TAB at 13:05

## 2024-10-25 RX ADMIN — Medication 1 TABLET: at 08:54

## 2024-10-25 RX ADMIN — HEPARIN SODIUM 5000 UNITS: 5000 INJECTION INTRAVENOUS; SUBCUTANEOUS at 21:47

## 2024-10-25 NOTE — PROGRESS NOTES
"Progress Note - Urology   Name: Ragini Melendez 69 y.o. female I MRN: [id number]  Unit/Bed#: -01 I Date of Admission: 10/24/2024   Date of Service: 10/25/2024 I Hospital Day: 1    Assessment & Plan  Bilateral hydronephrosis  Postoperative day 1 diversionary ileal conduit creation without cystectomy and bilateral ureterolysis.  Patient in good spirits.  On PCEA per APS-/appreciated  Abdominal dressing clean dry and intact--will remove tomorrow postoperative day 2  Positive urostomy--stents x 2.  Pink viable stoma.  Draining john urine into Sifuentes bag.  900 cc / 12 hours  SUSAN drain--serosanguineous.  50 cc in the past 12 hours.    Plan:  Continue routine postoperative care.  DVT prophylaxis and incentive spirometry at the bedside.  Encourage early ambulation--will order PT/OT  Pain management per APS--appreciated  Trend labs  Maintain all drains--strict output  Monitor for return of bowel function.  Would not advance diet just yet.    Endometrial cancer (HCC)  Followed by GYN/oncology--appreciate collaboration.    History of radiation as well as systemic chemotherapy.    CKD (chronic kidney disease) stage 4, GFR 15-29 ml/min ([facility])  Lab Results   Component Value Date    EGFR 21 10/25/2024    EGFR 19 10/24/2024    EGFR 25 10/14/2024    EGFR 25 10/14/2024    CREATININE 2.27 (H) 10/25/2024    CREATININE 2.46 (H) 10/24/2024    CREATININE 2.00 (H) 10/14/2024    CREATININE 1.99 (H) 10/14/2024   Followed by nephrology--appreciated   Patient with history of renal atrophy and essentially solitary kidney status  Interval improvement in renal function.    Please contact the SecureChat role,\"Urology\", with any questions/concerns.    Subjective /HPI  Ragini Melendez is a pleasant 69-year-old female with history of endometrial carcinoma status post ASH/BSO with findings consistent with metastatic disease.  She underwent wide-field pelvic radiation and unfortunately developed subsequent bilateral distal ureteral strictures and " resultant chronic bilateral hydronephrosis.  She was initially managed with chronic indwelling ureteral stents, but later redeveloped hydronephrosis and sepsis despite retained stents.  She later transitioned to bilateral nephrostomy tubes for maximal drainage.  CT demonstrated right renal unit atrophy.  After vaccination of risk versus benefits and potential complications, she was agreeable to proceed with exploratory laparotomy ureterolysis and ileal conduit creation for urinary diversion without cystectomy.  She is now postoperative day 1 the after mentioned procedure performed by GARRET Brice surgeon of record.  He is seen today in follow-up.    Patient is alert, oriented and in good spirits.  On PCEA, but complaining of some additional abdominal pain.  APS following.  Otherwise, tolerating clear liquids without nausea/vomiting.  Patient offers no other complaints.      Objective :  Temp:  [97.5 °F (36.4 °C)-98.3 °F (36.8 °C)] 98.1 °F (36.7 °C)  HR:  [74-86] 74  BP: ()/(51-81) 140/77  Resp:  [14-18] 18  SpO2:  [93 %-100 %] 93 %  O2 Device: None (Room air)    I/O         10/23 0701  10/24 0700 10/24 0701  10/25 0700 10/25 0701  10/26 0700    P.O.  0     I.V. (mL/kg)  2686 (37.5) 167.8 (2.3)    IV Piggyback  200     Total Intake(mL/kg)  2886 (40.3) 167.8 (2.3)    Urine (mL/kg/hr)  900 (0.5)     Drains  190     Total Output  1090     Net  +1796 +167.8                 Lines/Drains/Airways       Active Status       Name Placement date Placement time Site Days    Epidural Catheter 10/24/24 10/24/24  0750  -- 1    Closed/Suction Drain LLQ 10/24/24  1230  LLQ  less than 1    Nephrostomy Left 10 Fr. 10/16/24  1114  Left  8    Nephrostomy Right 10.2 Fr. 10/16/24  1122  Right  8    Urostomy Ileal conduit RLQ 10/24/24  1158  RLQ  less than 1                  Physical Exam    General appearance: alert and oriented, in no acute distress, appears stated age, cooperative, and no distress  Head: Normocephalic,  without obvious abnormality, atraumatic  Neck: no JVD and supple, symmetrical, trachea midline  Lungs: diminished breath sounds  Heart: regular rate and rhythm, S1, S2 normal, no murmur, click, rub or gallop  Abdomen: abnormal findings:  moderate tenderness in the lower abdomen and dressing clean dry and intact.  Extremities: extremities normal, warm and well-perfused; no cyanosis, clubbing, or edema  Pulses: 2+ and symmetric  Neurologic: Grossly normal  Urostomy and SUSAN.  See above for output      Lab Results: I have reviewed the following results:  Recent Labs     10/25/24  0432   WBC 12.10*   HGB 10.1*   HCT 31.7*      SODIUM 133*   K 4.7      CO2 20*   BUN 40*   CREATININE 2.27*   GLUC 174*           VTE Pharmacologic Prophylaxis: Heparin  VTE Mechanical Prophylaxis: sequential compression device

## 2024-10-25 NOTE — ASSESSMENT & PLAN NOTE
Lab Results   Component Value Date    EGFR 21 10/25/2024    EGFR 19 10/24/2024    EGFR 25 10/14/2024    EGFR 25 10/14/2024    CREATININE 2.27 (H) 10/25/2024    CREATININE 2.46 (H) 10/24/2024    CREATININE 2.00 (H) 10/14/2024    CREATININE 1.99 (H) 10/14/2024     Mild MONROE on CKD (baseline approximately 2.0) in setting of surgery, intraoperative fluid loss  Improving from 2.46 immediately postop  UOP improved with 900cc/12h overnight (1cc/kg/h) after bolus

## 2024-10-25 NOTE — PROGRESS NOTES
Progress Note - GYN Oncology   Name: Ragini Melendez 69 y.o. female I MRN: [id number]  Unit/Bed#: -01 I Date of Admission: 10/24/2024   Date of Service: 10/25/2024 I Hospital Day: 1     Assessment & Plan  Bilateral hydronephrosis  69 y.o. with bilateral ureteral strictures after radiation for recurrent endometrial cancer with bilateral PCNs in place, now POD1 s/p ileal conduit, bilateral ureterolysis.   Overall recovering well. Appreciate excellent care by primary urology team.   Diet as per urology  PCEA in place; appreciate APS recs- consider adding robaxin   Ileal conduit pink and viable with excellent UOP after bolus  VTE ppx: SQH TID   Encouraged IS and OOB  Consider PT/OT  Gyn onc will continue to follow peripherally    For questions/concerns on this patient, please reach out to the following:  BE GynOnc- Resident/AP    Benign essential hypertension  Normotensive to mildly hypertensive, not on medications  Endometrial cancer (HCC)  History of stage IB grade 1 endometrial cancer with high-grade recurrence to right pelvic side wall s/p carbo/taxol and EBRT in 2020, and deb recurrence treated with rubraca, atezolizumab, bevacizumab in 2022.    No evidence of disease noted intraoperatively     CKD (chronic kidney disease) stage 4, GFR 15-29 ml/min ([facility])  Lab Results   Component Value Date    EGFR 21 10/25/2024    EGFR 19 10/24/2024    EGFR 25 10/14/2024    EGFR 25 10/14/2024    CREATININE 2.27 (H) 10/25/2024    CREATININE 2.46 (H) 10/24/2024    CREATININE 2.00 (H) 10/14/2024    CREATININE 1.99 (H) 10/14/2024     Mild MONROE on CKD (baseline approximately 2.0) in setting of surgery, intraoperative fluid loss  Improving from 2.46 immediately postop  UOP improved with 900cc/12h overnight (1cc/kg/h) after bolus  Anemia due to stage 4 chronic kidney disease  (HCC)  Preop Hb 10.9  POD1 CBC Hb 10.1   Appropriate in setting of surgery  Continue to trend           Subjective:    Ragini Melendez reports right lower  "quadrant pain that is crampy and spastic in nature.  Overnight events: Received 1 L bolus. Pain is moderately well controlled with PO tylenol and PCEA.  Patient is currently voiding via ileal conduit.  She is not ambulating yet.  Patient is not currently passing flatus and has had no bowel movement. She is tolerating CLD, and denies nausea or vomiting. Patient denies fever, chills, chest pain, shortness of breath, or calf tenderness. Has not yet used incentive spirometer, educated on use at bedside.     Objective:  /77   Pulse 74   Temp 98.1 °F (36.7 °C)   Resp 18   Ht 4' 9\" (1.448 m)   Wt 71.7 kg (158 lb)   LMP  (LMP Unknown)   SpO2 93%   BMI 34.19 kg/m²     I/O last 3 completed shifts:  In: 2886 [I.V.:2686; IV Piggyback:200]  Out: 1090 [Urine:900; Drains:190]  I/O this shift:  In: 167.8 [I.V.:167.8]  Out: -     Lab Results   Component Value Date    WBC 12.10 (H) 10/25/2024    HGB 10.1 (L) 10/25/2024    HCT 31.7 (L) 10/25/2024    MCV 92 10/25/2024     10/25/2024       Lab Results   Component Value Date    GLUCOSE 219 (H) 12/19/2017    CALCIUM 7.8 (L) 10/25/2024     10/27/2017    K 4.7 10/25/2024    CO2 20 (L) 10/25/2024     10/25/2024    BUN 40 (H) 10/25/2024    CREATININE 2.27 (H) 10/25/2024           Physical Exam  Constitutional:       General: She is not in acute distress.     Appearance: Normal appearance.   HENT:      Head: Normocephalic and atraumatic.   Pulmonary:      Effort: Pulmonary effort is normal. No respiratory distress.   Abdominal:      General: There is no distension.      Palpations: Abdomen is soft.      Comments: Appropriately TTP, ileal conduit pink with pink tinged urine  Midline dressing in place  C/d/I  SUSAN with SS output   Musculoskeletal:      Right lower leg: No edema.      Left lower leg: No edema.   Skin:     General: Skin is warm and dry.   Neurological:      General: No focal deficit present.      Mental Status: She is alert and oriented to person, " place, and time.   Psychiatric:         Mood and Affect: Mood normal.         Behavior: Behavior normal.           Judson Desai MD  10/25/2024  8:07 AM

## 2024-10-25 NOTE — ASSESSMENT & PLAN NOTE
Postoperative day 1 diversionary ileal conduit creation without cystectomy and bilateral ureterolysis.  Patient in good spirits.  On PCEA per APS-/appreciated  Abdominal dressing clean dry and intact--will remove tomorrow postoperative day 2  Positive urostomy--stents x 2.  Pink viable stoma.  Draining john urine into Sifuentes bag.  900 cc / 12 hours  SUSAN drain--serosanguineous.  50 cc in the past 12 hours.    Plan:  Continue routine postoperative care.  DVT prophylaxis and incentive spirometry at the bedside.  Encourage early ambulation--will order PT/OT  Pain management per APS--appreciated  Trend labs  Maintain all drains--strict output  Monitor for return of bowel function.  Would not advance diet just yet.

## 2024-10-25 NOTE — ASSESSMENT & PLAN NOTE
69 y.o. with bilateral ureteral strictures after radiation for recurrent endometrial cancer with bilateral PCNs in place, now POD1 s/p ileal conduit, bilateral ureterolysis.   Overall recovering well. Appreciate excellent care by primary urology team.   Diet as per urology  PCEA in place; appreciate APS recs- consider adding robaxin   Ileal conduit pink and viable with excellent UOP after bolus  VTE ppx: SQH TID   Encouraged IS and OOB  Consider PT/OT  Gyn onc will continue to follow peripherally    For questions/concerns on this patient, please reach out to the following:  BE GynOnc- Resident/AP

## 2024-10-25 NOTE — UTILIZATION REVIEW
Initial Clinical Review    Elective Inpatient surgical procedure  Age/Sex: 69 y.o. female  Surgery Date: 10/24  Procedure: S/p ILEAL CONDUIT urinary diversion (No cystectomy). BILATERAL UTERO-LYSIS  Anesthesia: General    POD#1 Progress Note:   10/25  PCEA in place for pain control, consider adding robaxin per APS.  Ileal conduit pink and viable with excellent UOP after bolus   Normotensive to mildly hypertensive, not on medications     Per APS; Continue thoracic epidural 0.1% ropivacaine/2mcg/ml fentanyl; PCEA settings increased to 12/7/10/4  Tylenol 650 mg q6 scheduled. Iv Dilaudid prn    Per Urology; SUSAN drain--serosanguineous.  50 cc in the past 12 hours.   Positive urostomy--stents x 2.  Pink viable stoma.  Draining john urine into Sifuentes bag.  900 cc / 12 hours   Maintain all drains - strict outpt, Would not advance diet yet. Monitor for return of bowel function.  Abdominal dressing clean dry and intact--will remove tomorrow postoperative day 2.  Pain management per APS.     Admission Orders: Date/Time/Statement:   Admission Orders (From admission, onward)       Ordered        10/24/24 0723  Inpatient Admission  Once                          Orders Placed This Encounter   Procedures    Inpatient Admission     Standing Status:   Standing     Number of Occurrences:   1     Order Specific Question:   Level of Care     Answer:   Med Surg [16]     Order Specific Question:   Estimated length of stay     Answer:   More than 2 Midnights     Order Specific Question:   Certification     Answer:   I certify that inpatient services are medically necessary for this patient for a duration of greater than two midnights. See H&P and MD Progress Notes for additional information about the patient's course of treatment.     Diet: Clear liquid  Mobility: Up as tolerated  DVT Prophylaxis: SCD    Medications/Pain Control:   Scheduled Medications:  acetaminophen, 650 mg, Oral, Q6H CHRISTIANO  ARIPiprazole, 20 mg, Oral, QAM  calcitriol, 0.25  mcg, Oral, Once per day on Monday Wednesday Friday  calcium acetate, 667 mg, Oral, TID With Meals  calcium carbonate-vitamin D, 1 tablet, Oral, Daily With Breakfast  cholecalciferol, 4,000 Units, Oral, Daily  vitamin B-12, 50 mcg, Oral, Daily  docusate sodium, 100 mg, Oral, BID  fluticasone, 1 spray, Nasal, Daily  folic acid, 1 mg, Oral, HS  heparin (porcine), 5,000 Units, Subcutaneous, Q12H CHRISTIANO  loratadine, 10 mg, Oral, Daily  multivitamin stress formula, 1 tablet, Oral, QAM  pantoprazole, 20 mg, Oral, Early Morning  venlafaxine, 37.5 mg, Oral, Daily      Continuous IV Infusions:  dextrose 5% lactated ringer's, 125 mL/hr, Intravenous, Continuous  ropivacaine 0.1% and fentaNYL 2 mcg/mL, , Epidural, Continuous      PRN Meds:  HYDROmorphone, 0.5 mg, Intravenous, Q1H PRN  lactated ringers, 1,000 mL, Intravenous, Once PRN   And  lactated ringers, 1,000 mL, Intravenous, Once PRN  naloxone, 0.1 mg, Intravenous, Q1MIN PRN  ondansetron, 4 mg, Intravenous, Q6H PRN  sodium chloride, 1,000 mL, Intravenous, Once PRN   And  sodium chloride, 1,000 mL, Intravenous, Once PRN      Vital Signs (last 3 days)       Date/Time Temp Pulse Resp BP MAP (mmHg) SpO2 O2 Flow Rate (L/min) O2 Device Cardiac (WDL) Patient Position - Orthostatic VS Pain    10/25/24 07:10:28 98.1 °F (36.7 °C) -- -- 140/77 98 -- -- -- -- -- --    10/25/24 0524 -- -- -- -- -- -- -- -- -- -- 7    10/25/24 0100 -- -- -- -- -- -- -- -- -- -- 3    10/24/24 21:50:59 98.3 °F (36.8 °C) 74 18 134/81 99 93 % -- None (Room air) -- Lying --    10/24/24 18:55:26 98.1 °F (36.7 °C) 79 17 140/81 101 94 % -- None (Room air) -- Lying --    10/24/24 1500 -- -- -- -- -- -- -- -- -- -- 5    10/24/24 14:22:46 97.9 °F (36.6 °C) 84 -- 136/79 98 95 % -- -- -- -- --    10/24/24 1400 -- 83 16 122/59 85 94 % -- None (Room air) -- Lying No Pain    10/24/24 1345 -- 82 14 121/58 84 94 % -- None (Room air) -- Lying 3    10/24/24 1330 -- 81 18 97/51 70 95 % -- None (Room air) -- Lying 5    10/24/24  "1315 -- 82 17 99/55 73 96 % -- None (Room air) -- Lying 6    10/24/24 1300 -- 86 16 105/55 75 97 % -- None (Room air) -- Lying No Pain    10/24/24 1256 97.5 °F (36.4 °C) 84 17 121/62 83 100 % 6 L/min Simple mask WDL Lying --    10/24/24 0914 -- -- -- -- -- -- -- -- -- -- Med Not Given for Pain - for MAR use only    10/24/24 0631 97.8 °F (36.6 °C) 61 22 105/57 -- 95 % -- None (Room air) -- -- No Pain          Weight (last 2 days)       Date/Time Weight    10/24/24 0631 71.7 (158)            Pertinent Labs/Diagnostic Test Results:   Radiology:  No orders to display     Cardiology:  No orders to display     GI:  No orders to display           Results from last 7 days   Lab Units 10/25/24  0432 10/24/24  1652 10/24/24  1328   WBC Thousand/uL 12.10*  --  23.28*   HEMOGLOBIN g/dL 10.1*  --  11.7   HEMATOCRIT % 31.7*  --  37.4   PLATELETS Thousands/uL 255 278 286         Results from last 7 days   Lab Units 10/25/24  0432 10/24/24  1328   SODIUM mmol/L 133* 134*   POTASSIUM mmol/L 4.7 4.5   CHLORIDE mmol/L 104 103   CO2 mmol/L 20* 20*   ANION GAP mmol/L 9 11   BUN mg/dL 40* 42*   CREATININE mg/dL 2.27* 2.46*   EGFR ml/min/1.73sq m 21 19   CALCIUM mg/dL 7.8* 8.2*             Results from last 7 days   Lab Units 10/25/24  0432 10/24/24  1328   GLUCOSE RANDOM mg/dL 174* 174*             No results found for: \"BETA-HYDROXYBUTYRATE\"                                                               Results from last 7 days   Lab Units 10/25/24  0748   UNIT PRODUCT CODE  F1088H40  [id number]   UNIT NUMBER  [id number]  [id number]   UNITABO  O  O   UNITRH  POS  POS   CROSSMATCH  Compatible  Compatible   UNIT DISPENSE STATUS  Return to Inv  Return to Inv   UNIT PRODUCT VOL mL 350  350                                                                           Network Utilization Review Department  ATTENTION: Please call with any questions or concerns to [phone] and carefully listen to the prompts so that you are " directed to the right person. All voicemails are confidential.   For Discharge needs, contact Care Management DC Support Team at [phone] opt. 2  Send all requests for admission clinical reviews, approved or denied determinations and any other requests to dedicated fax number below belonging to the [facility] where the patient is receiving treatment. List of dedicated fax numbers for the Facilities:  FACILITY NAME UR FAX NUMBER   ADMISSION DENIALS (Administrative/Medical Necessity) [phone]   DISCHARGE SUPPORT TEAM (NETWORK) [phone]   PARENT CHILD HEALTH (Maternity/NICU/Pediatrics) [phone]   [facility] [phone]   [facility] [phone]   [facility] [phone]   [facility] [phone]   [facility] [phone]   [facility] [phone]   [facility] [phone]   [facility] [phone]   [facility] [phone]   [facility] [phone]   [facility] [phone]   [facility] [phone]

## 2024-10-25 NOTE — ASSESSMENT & PLAN NOTE
History of stage IB grade 1 endometrial cancer with high-grade recurrence to right pelvic side wall s/p carbo/taxol and EBRT in 2020, and deb recurrence treated with rubraca, atezolizumab, bevacizumab in 2022.    No evidence of disease noted intraoperatively

## 2024-10-25 NOTE — ASSESSMENT & PLAN NOTE
Lab Results   Component Value Date    EGFR 21 10/25/2024    EGFR 19 10/24/2024    EGFR 25 10/14/2024    EGFR 25 10/14/2024    CREATININE 2.27 (H) 10/25/2024    CREATININE 2.46 (H) 10/24/2024    CREATININE 2.00 (H) 10/14/2024    CREATININE 1.99 (H) 10/14/2024   Followed by nephrology--appreciated   Patient with history of renal atrophy and essentially solitary kidney status  Interval improvement in renal function.

## 2024-10-25 NOTE — CASE MANAGEMENT
Case Management Assessment & Discharge Planning Note    Patient name Ragini Melendez  Location /-01 MRN [id number]  : 1955 Date 10/25/2024       Current Admission Date: 10/24/2024  Current Admission Diagnosis:Bilateral hydronephrosis   Patient Active Problem List    Diagnosis Date Noted Date Diagnosed    Bilateral hydronephrosis 10/24/2024     Anemia due to stage 4 chronic kidney disease  ([facility]) 10/24/2024     Hypertensive CKD (chronic kidney disease) 2024     Thrombocytopenia, unspecified ([facility]) 2024     Overweight with body mass index (BMI) of 28 to 28.9 in adult 2023     Ureteral stricture 2023     Left-sided chest wall pain 2023     Preop cardiovascular exam 2023     CKD (chronic kidney disease) stage 4, GFR 15-29 ml/min ([facility])      Other proteinuria      History of DVT (deep vein thrombosis) 2023     Hemorrhagic cystitis w/ pyelonephritis 2023     Abnormal iron saturation 2023     Dry mouth 2023     Nephrotoxicity 10/03/2022     Vaginal atrophy 2022     Depression, recurrent ([facility]) 2022     Chronic UTI 2021     Secondary malignant neoplasm of other specified sites ([facility]) 2021     Closed nondisplaced fracture of right pubis with delayed healing 2021     Need for follow-up by  2021     Dental disorder 2021     Osteoporosis 2020     Pathological fracture due to osteoporosis 2020     Bilateral piriformis syndrome      Chronic pain syndrome      Myofascial pain syndrome      Nephrostomy status (HCC) 10/14/2020     Overactive bladder 2020     Acute left-sided low back pain without sciatica 2020     Obstruction of right ureter 2020     Hypomagnesemia 10/18/2019     Severe protein-calorie malnutrition (HCC) 10/03/2019     Bilateral lower extremity edema 2019     Acute on chronic kidney disease 2019     Anemia 2019     Chemotherapy induced  neutropenia (HCC) 08/30/2019     Encounter for central line care 07/16/2019     Encounter for follow-up surveillance of endometrial cancer 03/17/2019     Benign essential hypertension 12/19/2017     Endometrial cancer (HCC) 11/29/2017     Dyslipidemia 04/01/2014     Major depression, chronic 10/07/2013       LOS (days): 1  Geometric Mean LOS (GMLOS) (days): 2.9  Days to GMLOS:1.6     OBJECTIVE:    Risk of Unplanned Readmission Score: 25.87       Current admission status: Inpatient       Preferred Pharmacy:   [id number] - [place] - [address]  [address]  [place] [address]  Phone: [phone] Fax: [phone]    [facility] - [place] - [address]  [address]  [place] [address]  Phone: [phone] Fax: [phone]    Primary Care Provider: Emely Yost DO    Primary Insurance: GREGOR SUTTON  Secondary Insurance:     ASSESSMENT:  Active Health Care Proxies       Nahomi Melendez Health Care Agent - Sister   Primary Phone: [phone] (Mobile)  Home Phone: [phone]                 Advance Directives  Does patient have a Health Care POA?: Yes  Does patient have Advance Directives?: Yes  Advance Directives: Living will, Power of  for health care  Primary Contact: Nahomi Melendez         Readmission Root Cause  30 Day Readmission: No    Patient Information  Admitted from:: Home  Mental Status: Alert  During Assessment patient was accompanied by: Not accompanied during assessment  Assessment information provided by:: Patient  Primary Caregiver: Self  Support Systems: Parent, Family members, Self  County of Residence: [place]  What city do you live in?: [place]  Home entry access options. Select all that apply.: Stairs  Number of steps to enter home.: 3  Type of Current Residence: 2 story home  Upon entering residence, is there a bedroom on the main floor (no further steps)?: Yes  Upon entering residence, is there a  bathroom on the main floor (no further steps)?: Yes  Living Arrangements: Lives w/ Parent(s) (Lives with her Mother.)  Is patient a ?: No    Activities of Daily Living Prior to Admission  Functional Status: Independent  Completes ADLs independently?: Yes  Ambulates independently?: Yes  Does patient use assisted devices?: Yes  Assisted Devices (DME) used: Straight Cane, Walker  Does patient currently own DME?: No  Does patient have a history of Outpatient Therapy (PT/OT)?: Yes  Does the patient have a history of Short-Term Rehab?: Yes (Pt cannot recall the name of the STR)  Does patient have a history of HHC?: Yes ([facility])  Does patient currently have HHC?: No         Patient Information Continued  Income Source: Pension/nursing home  Does patient have prescription coverage?: Yes  Does patient receive dialysis treatments?: No  Does patient have a history of substance abuse?: No  Does patient have a history of Mental Health Diagnosis?: No         Means of Transportation  Means of Transport to [facility]:: Family transport      Social Determinants of Health (SDOH)      Flowsheet Row Most Recent Value   Housing Stability    In the last 12 months, was there a time when you were not able to pay the mortgage or rent on time? N   In the past 12 months, how many times have you moved where you were living? 0   At any time in the past 12 months, were you homeless or living in a shelter (including now)? N   Transportation Needs    In the past 12 months, has lack of transportation kept you from medical appointments or from getting medications? no   In the past 12 months, has lack of transportation kept you from meetings, work, or from getting things needed for daily living? No   Food Insecurity    Within the past 12 months, you worried that your food would run out before you got the money to buy more. Never true   Within the past 12 months, the food you bought just didn't last and you didn't have money to get more. Never  true   Utilities    In the past 12 months has the electric, gas, oil, or water company threatened to shut off services in your home? No            DISCHARGE DETAILS:    Discharge planning discussed with:: Patient  Freedom of Choice: Yes  Comments - Freedom of Choice: FOC discussed.  PT/OT recs pending until pain better controlled. Pt said her first  choice would be home with [facility] if recommended, but will discuss options if other recommendations are made.     Other Referral/Resources/Interventions Provided:  Referral Comments: Pt admitted with bilateral hydronephrosis, hx of uterine cancer complicated by bilateral urteral obstruction requiring PCNS, now S/P Ileal conduit urinary division, no cystectomy, bilateral utero-lysis and thoracic epidural placed 10/24. PT/OT pending.     CM met with pt at bedside, introduced myself and explained my role.  Pt lives in 2 story home, [address] [facility] with her mother.  IADL.  DME includes cane and walker.  Pt had OP PT previously, STR (she cannot recall the name) and [facility].  PT/OT pending.  CM following.

## 2024-10-25 NOTE — PROGRESS NOTES
Progress Note - Nephrology   Name: Ragini Melendez 69 y.o. female I MRN: [id number]  Unit/Bed#: -01 I Date of Admission: 10/24/2024   Date of Service: 10/25/2024 I Hospital Day: 1    Assessment & Plan  CKD (chronic kidney disease) stage 4, GFR 15-29 ml/min ([facility])  Etiology: suspect secondary to hypertension, obstructive uropathy in the setting of bilateral ureteral strictures, underlying glomerular pathology as well as age-related nephron loss  Her creatinine remains within her baseline, she remains on intravenous fluids she has not eaten anything and can maintain on IV fluids until tolerating p.o.  Currently on D5 LR at 125 mL/h, no history of congestive heart failure, monitor volume status, decrease rate if prolonged IV fluids  Benign essential hypertension  Blood pressure acceptable 136/79  Not on antihypertensive medications at home  Avoid hypotension of pertubation's of blood pressure    Anemia due to stage 4 chronic kidney disease  (HCC)  Current hemoglobin 11.7  Previously received IV iron supplementation with oncology  Avoid KENNEY given malignancy  Defer management of anemia to hematology  Bilateral hydronephrosis  Status post ileal conduit urine diversion (no cystectomy) lateral ureterolysis 10/24/2024  Urology following  Endometrial cancer (HCC)  Has been greater than 2 years off treatment per gyn oncology feelsthis is prolonged remission  GYn oncology following    Please contact the SecureChat role for the Nephrology service with any questions/concerns.    Subjective     Patient was seen today.  She states she still has a considerable amount of pain, but feels like she could eat something today.    Objective :  Temp:  [97.5 °F (36.4 °C)-98.3 °F (36.8 °C)] 98.1 °F (36.7 °C)  HR:  [74-86] 74  BP: ()/(51-81) 140/77  Resp:  [14-18] 18  SpO2:  [93 %-100 %] 93 %  O2 Device: None (Room air)    Current Weight: Weight - Scale: 71.7 kg (158 lb)  First Weight: Weight - Scale: 71.7 kg (158 lb)  I/O          10/23 0701  10/24 0700 10/24 0701  10/25 0700 10/25 0701  10/26 0700    P.O.  0     I.V. (mL/kg)  2686 (37.5) 167.8 (2.3)    IV Piggyback  200     Total Intake(mL/kg)  2886 (40.3) 167.8 (2.3)    Urine (mL/kg/hr)  900 (0.5)     Drains  190     Total Output  1090     Net  +1796 +167.8                 Physical Exam  Vitals and nursing note reviewed.   Constitutional:       General: She is not in acute distress.     Appearance: She is well-developed.   HENT:      Head: Normocephalic and atraumatic.   Eyes:      Conjunctiva/sclera: Conjunctivae normal.   Cardiovascular:      Rate and Rhythm: Normal rate and regular rhythm.      Heart sounds: No murmur heard.  Pulmonary:      Effort: Pulmonary effort is normal. No respiratory distress.      Breath sounds: Normal breath sounds.   Abdominal:      Palpations: Abdomen is soft.      Tenderness: There is no abdominal tenderness.   Musculoskeletal:         General: No swelling.      Cervical back: Neck supple.   Skin:     General: Skin is warm and dry.      Capillary Refill: Capillary refill takes less than 2 seconds.   Neurological:      Mental Status: She is alert.   Psychiatric:         Mood and Affect: Mood normal.         Medications:    Current Facility-Administered Medications:     acetaminophen (TYLENOL) tablet 650 mg, 650 mg, Oral, Q6H [facility], Demetrius Lowe MD, 650 mg at 10/25/24 0524    ARIPiprazole (ABILIFY) tablet 20 mg, 20 mg, Oral, QAM, Demetrius Lowe MD, 20 mg at 10/25/24 0853    calcitriol (ROCALTROL) capsule 0.25 mcg, 0.25 mcg, Oral, Once per day on Monday Wednesday Friday, Demetrius Lowe MD, 0.25 mcg at 10/25/24 0853    calcium acetate (PHOSLO) capsule 667 mg, 667 mg, Oral, TID With Meals, Demetrius Lowe MD, 667 mg at 10/25/24 0854    calcium carbonate-vitamin D 500 mg-5 mcg tablet 1 tablet, 1 tablet, Oral, Daily With Breakfast, Demetrius Lowe MD, 1 tablet at 10/25/24 0854    Cholecalciferol (VITAMIN D3) tablet 4,000 Units,  4,000 Units, Oral, Daily, Demetrius Lowe MD, 4,000 Units at 10/25/24 0853    cyanocobalamin (VITAMIN B-12) tablet 50 mcg, 50 mcg, Oral, Daily, Demetrius Lowe MD, 50 mcg at 10/25/24 0853    dextrose 5 % in lactated Ringer's infusion, 125 mL/hr, Intravenous, Continuous, Demetrius Lowe MD, Last Rate: 125 mL/hr at 10/25/24 0854, 125 mL/hr at 10/25/24 0854    docusate sodium (COLACE) capsule 100 mg, 100 mg, Oral, BID, Demetrius Lowe MD, 100 mg at 10/25/24 0853    fluticasone (FLONASE) 50 mcg/act nasal spray 1 spray, 1 spray, Nasal, Daily, Demetrius Lowe MD    folic acid (FOLVITE) tablet 1 mg, 1 mg, Oral, HS, Demetrius Lowe MD, 1 mg at 10/24/24 2104    heparin (porcine) subcutaneous injection 5,000 Units, 5,000 Units, Subcutaneous, Q12H CHRISTIANO, 5,000 Units at 10/25/24 0855 **AND** [COMPLETED] Platelet count, , , Once, Demetrius Lowe MD    HYDROmorphone (DILAUDID) injection 0.5 mg, 0.5 mg, Intravenous, Q1H PRN, Vaishali Amanda MD    lactated ringers bolus 1,000 mL, 1,000 mL, Intravenous, Once PRN **AND** lactated ringers bolus 1,000 mL, 1,000 mL, Intravenous, Once PRN, Demetrius Lowe MD    loratadine (CLARITIN) tablet 10 mg, 10 mg, Oral, Daily, Demetrius Lowe MD, 10 mg at 10/25/24 0853    multivitamin stress formula tablet 1 tablet, 1 tablet, Oral, QAM, Demetrius Lowe MD    naloxone (NARCAN) injection 0.1 mg, 0.1 mg, Intravenous, Q1MIN PRN, Vaishali Amanda MD    ondansetron (ZOFRAN) injection 4 mg, 4 mg, Intravenous, Q6H PRN, Demetrius Lowe MD, 4 mg at 10/24/24 1439    pantoprazole (PROTONIX) EC tablet 20 mg, 20 mg, Oral, Early Morning, Demetrius Lowe MD, 20 mg at 10/25/24 0524    ropivacaine 0.1% and fentaNYL 2 mcg/mL PCEA, , Epidural, Continuous, Thomas Begum MD    sodium chloride 0.9 % bolus 1,000 mL, 1,000 mL, Intravenous, Once PRN **AND** sodium chloride 0.9 % bolus 1,000 mL, 1,000 mL, Intravenous, Once PRN, Demetrius Warner  "MD Mynor    venlafaxine (EFFEXOR-XR) 24 hr capsule 37.5 mg, 37.5 mg, Oral, Daily, Demetrius Lowe MD, 37.5 mg at 10/25/24 0853      Lab Results: I have reviewed the following results:  Results from last 7 days   Lab Units 10/25/24  0432 10/24/24  1652 10/24/24  1328   WBC Thousand/uL 12.10*  --  23.28*   HEMOGLOBIN g/dL 10.1*  --  11.7   HEMATOCRIT % 31.7*  --  37.4   PLATELETS Thousands/uL 255 278 286   POTASSIUM mmol/L 4.7  --  4.5   CHLORIDE mmol/L 104  --  103   CO2 mmol/L 20*  --  20*   BUN mg/dL 40*  --  42*   CREATININE mg/dL 2.27*  --  2.46*   CALCIUM mg/dL 7.8*  --  8.2*       Administrative Statements     Portions of the record may have been created with voice recognition software. Occasional wrong word or \"sound a like\" substitutions may have occurred due to the inherent limitations of voice recognition software. Read the chart carefully and recognize, using context, where substitutions have occurred.If you have any questions, please contact the dictating provider.  "

## 2024-10-25 NOTE — ASSESSMENT & PLAN NOTE
Followed by GYN/oncology--appreciate collaboration.    History of radiation as well as systemic chemotherapy.

## 2024-10-25 NOTE — ASSESSMENT & PLAN NOTE
Lab Results   Component Value Date    EGFR 21 10/25/2024    EGFR 19 10/24/2024    EGFR 25 10/14/2024    EGFR 25 10/14/2024    CREATININE 2.27 (H) 10/25/2024    CREATININE 2.46 (H) 10/24/2024    CREATININE 2.00 (H) 10/14/2024    CREATININE 1.99 (H) 10/14/2024

## 2024-10-25 NOTE — ASSESSMENT & PLAN NOTE
Bilateral ureteral strictures 2/2 radiation for recurrent endometrial cancer with bilateral PCNs in place   S/p ileal conduit, bilateral ureterolysis on 10/24/24    Thoracic epidural placed on 10/24  No evidence of excessive sympathectomy-induced hypotension/pressor requirements/abnormal sensorimotor deficits. Epidural site c/d/I.     Plan:  Continue thoracic epidural 0.1% ropivacaine/2mcg/ml fentanyl; PCEA settings increased to 12/7/10/4  Tylenol 650 mg q6 scheduled  IV dilaudid 0.5mg q1 PRN

## 2024-10-25 NOTE — CONSULTS
Consultation - Acute Pain   Name: Ragini Melendez 69 y.o. female I MRN: [id number]  Unit/Bed#: -01 I Date of Admission: 10/24/2024   Date of Service: 10/25/2024 I Hospital Day: 1   Inpatient consult to Acute Pain Service  Consult performed by: Cayden Ferreira MD  Consult ordered by: Vaishali Amanda MD        Physician Requesting Evaluation: Demetrius Lowe MD   Reason for Evaluation / Principal Problem: Postop pain management    Assessment & Plan  Bilateral hydronephrosis  Bilateral ureteral strictures 2/2 radiation for recurrent endometrial cancer with bilateral PCNs in place   S/p ileal conduit, bilateral ureterolysis on 10/24/24    Thoracic epidural placed on 10/24  No evidence of excessive sympathectomy-induced hypotension/pressor requirements/abnormal sensorimotor deficits. Epidural site c/d/I.     Plan:  Continue thoracic epidural 0.1% ropivacaine/2mcg/ml fentanyl; PCEA settings increased to 12/7/10/4  Tylenol 650 mg q6 scheduled  IV dilaudid 0.5mg q1 PRN  Endometrial cancer (HCC)    CKD (chronic kidney disease) stage 4, GFR 15-29 ml/min ([facility])  Lab Results   Component Value Date    EGFR 21 10/25/2024    EGFR 19 10/24/2024    EGFR 25 10/14/2024    EGFR 25 10/14/2024    CREATININE 2.27 (H) 10/25/2024    CREATININE 2.46 (H) 10/24/2024    CREATININE 2.00 (H) 10/14/2024    CREATININE 1.99 (H) 10/14/2024           [facility] will continue to follow. Please contact Acute Pain Service - via SecureChat from [phone] with additional questions or concerns. See [employer] or [employer] for additional contacts and after hours information.     History of Present Illness    HPI: Ragini Melendez is a 69 y.o. year old female who presents with recurrent endometrial cancer with bilateral ureteral strictures secondary to radiation status post ileal conduit, bilateral ureterolysis on 10/24.   A perioperative thoracic epidural was placed for post-operative analgesia.     Upon evaluation patient was resting in bed in no acute  distress.  Pain moderately controlled with PCA and p.o. Tylenol.  Endorses spastic right lower quadrant pain.  Reports tolerating p.o. intake without nausea or vomiting.  Patient has not ambulated yet.  Patient has not passed flatus or had a bowel movement.  Denies chest pain, shortness of breath, fever, chills.    Current pain location(s): Pain Score: 7  Pain Location/Orientation: Location: Incision  Pain Scale: Pain Assessment Tool: 0-10      Review of Systems   Constitutional:  Negative for chills and fever.   HENT:  Negative for ear pain and sore throat.    Eyes:  Negative for pain and visual disturbance.   Respiratory:  Negative for cough and shortness of breath.    Cardiovascular:  Negative for chest pain and palpitations.   Gastrointestinal:  Negative for abdominal pain and vomiting.   Genitourinary:  Negative for dysuria and hematuria.   Musculoskeletal:  Negative for arthralgias and back pain.   Skin:  Negative for color change and rash.   Neurological:  Negative for seizures and syncope.   All other systems reviewed and are negative.    I have reviewed the patient's PMH, PSH, Social History, Family History, Meds, and Allergies    Objective :  Temp:  [97.5 °F (36.4 °C)-98.3 °F (36.8 °C)] 98.1 °F (36.7 °C)  HR:  [74-86] 74  BP: ()/(51-81) 140/77  Resp:  [14-18] 18  SpO2:  [93 %-100 %] 93 %  O2 Device: None (Room air)    Physical Exam  Vitals and nursing note reviewed.   Constitutional:       General: She is not in acute distress.     Appearance: She is well-developed.   HENT:      Head: Normocephalic and atraumatic.   Eyes:      Conjunctiva/sclera: Conjunctivae normal.   Cardiovascular:      Rate and Rhythm: Normal rate.      Pulses: Normal pulses.   Pulmonary:      Effort: Pulmonary effort is normal. No respiratory distress.   Abdominal:      Palpations: Abdomen is soft.      Tenderness: There is abdominal tenderness.   Musculoskeletal:         General: No swelling.      Cervical back: Neck supple.       Comments: Epidural site c/d/I   Skin:     General: Skin is warm and dry.      Capillary Refill: Capillary refill takes less than 2 seconds.   Neurological:      Mental Status: She is alert.   Psychiatric:         Mood and Affect: Mood normal.         Lab Results: I have reviewed the following results:  Estimated Creatinine Clearance: 21.3 mL/min (A) (by C-G formula based on SCr of 2.27 mg/dL (H)).  Lab Results   Component Value Date    WBC 12.10 (H) 10/25/2024    WBC 9.2 10/27/2017    HGB 10.1 (L) 10/25/2024    HGB 12.5 10/27/2017    HCT 31.7 (L) 10/25/2024    HCT 37.4 10/27/2017     10/25/2024     10/27/2017         Component Value Date/Time     10/27/2017 0709    K 4.7 10/25/2024 0432    K 4.2 11/01/2018 0744     10/25/2024 0432     11/01/2018 0744    CO2 20 (L) 10/25/2024 0432    CO2 24 11/01/2018 0744    BUN 40 (H) 10/25/2024 0432    BUN 12 11/01/2018 0744    CREATININE 2.27 (H) 10/25/2024 0432    CREATININE 0.55 10/31/2019 0430         Component Value Date/Time    CALCIUM 7.8 (L) 10/25/2024 0432    CALCIUM 9.4 10/27/2017 0709    ALKPHOS 102 10/14/2024 1137    ALKPHOS 104 10/27/2017 0709    AST 19 10/14/2024 1137    AST 12 11/01/2018 0744    ALT 20 10/14/2024 1137    ALT 14 11/01/2018 0744    BILITOT 0.3 10/27/2017 0709    TP 7.7 10/14/2024 1137    TP 7.1 10/14/2024 1137    TP 7.0 11/01/2018 0744    ALB 3.8 10/14/2024 1137    ALB 3.8 10/14/2024 1137    ALB 4.3 10/27/2017 0709       Imaging Results Review: No pertinent imaging studies reviewed.  Other Study Results Review: EKG was reviewed.

## 2024-10-25 NOTE — ASSESSMENT & PLAN NOTE
Etiology: suspect secondary to hypertension, obstructive uropathy in the setting of bilateral ureteral strictures, underlying glomerular pathology as well as age-related nephron loss  Her creatinine remains within her baseline, she remains on intravenous fluids she has not eaten anything and can maintain on IV fluids until tolerating p.o.  Currently on D5 LR at 125 mL/h, no history of congestive heart failure, monitor volume status, decrease rate if prolonged IV fluids

## 2024-10-26 LAB
ANION GAP SERPL CALCULATED.3IONS-SCNC: 8 MMOL/L (ref 4–13)
BUN SERPL-MCNC: 31 MG/DL (ref 5–25)
CALCIUM SERPL-MCNC: 8.7 MG/DL (ref 8.4–10.2)
CHLORIDE SERPL-SCNC: 107 MMOL/L (ref 96–108)
CO2 SERPL-SCNC: 23 MMOL/L (ref 21–32)
CREAT SERPL-MCNC: 2.17 MG/DL (ref 0.6–1.3)
ERYTHROCYTE [DISTWIDTH] IN BLOOD BY AUTOMATED COUNT: 13.2 % (ref 11.6–15.1)
GFR SERPL CREATININE-BSD FRML MDRD: 22 ML/MIN/1.73SQ M
GLUCOSE SERPL-MCNC: 113 MG/DL (ref 65–140)
HCT VFR BLD AUTO: 28.8 % (ref 34.8–46.1)
HGB BLD-MCNC: 9.1 G/DL (ref 11.5–15.4)
MAGNESIUM SERPL-MCNC: 1.6 MG/DL (ref 1.9–2.7)
MCH RBC QN AUTO: 29.8 PG (ref 26.8–34.3)
MCHC RBC AUTO-ENTMCNC: 31.6 G/DL (ref 31.4–37.4)
MCV RBC AUTO: 94 FL (ref 82–98)
PLATELET # BLD AUTO: 185 THOUSANDS/UL (ref 149–390)
PMV BLD AUTO: 9.7 FL (ref 8.9–12.7)
POTASSIUM SERPL-SCNC: 4.4 MMOL/L (ref 3.5–5.3)
RBC # BLD AUTO: 3.05 MILLION/UL (ref 3.81–5.12)
SODIUM SERPL-SCNC: 138 MMOL/L (ref 135–147)
WBC # BLD AUTO: 10.33 THOUSAND/UL (ref 4.31–10.16)

## 2024-10-26 PROCEDURE — 80048 BASIC METABOLIC PNL TOTAL CA: CPT | Performed by: NURSE PRACTITIONER

## 2024-10-26 PROCEDURE — 85027 COMPLETE CBC AUTOMATED: CPT | Performed by: NURSE PRACTITIONER

## 2024-10-26 PROCEDURE — 99024 POSTOP FOLLOW-UP VISIT: CPT | Performed by: UROLOGY

## 2024-10-26 PROCEDURE — 99233 SBSQ HOSP IP/OBS HIGH 50: CPT | Performed by: ANESTHESIOLOGY

## 2024-10-26 PROCEDURE — 99232 SBSQ HOSP IP/OBS MODERATE 35: CPT | Performed by: INTERNAL MEDICINE

## 2024-10-26 PROCEDURE — 83735 ASSAY OF MAGNESIUM: CPT | Performed by: NURSE PRACTITIONER

## 2024-10-26 RX ORDER — LANOLIN ALCOHOL/MO/W.PET/CERES
400 CREAM (GRAM) TOPICAL ONCE
Status: COMPLETED | OUTPATIENT
Start: 2024-10-26 | End: 2024-10-26

## 2024-10-26 RX ORDER — DEXTROSE, SODIUM CHLORIDE, SODIUM LACTATE, POTASSIUM CHLORIDE, AND CALCIUM CHLORIDE 5; .6; .31; .03; .02 G/100ML; G/100ML; G/100ML; G/100ML; G/100ML
50 INJECTION, SOLUTION INTRAVENOUS CONTINUOUS
Status: DISCONTINUED | OUTPATIENT
Start: 2024-10-26 | End: 2024-10-30 | Stop reason: HOSPADM

## 2024-10-26 RX ADMIN — PANTOPRAZOLE SODIUM 20 MG: 20 TABLET, DELAYED RELEASE ORAL at 06:02

## 2024-10-26 RX ADMIN — CALCIUM ACETATE 667 MG: 667 CAPSULE ORAL at 12:09

## 2024-10-26 RX ADMIN — ACETAMINOPHEN 650 MG: 325 TABLET, FILM COATED ORAL at 12:09

## 2024-10-26 RX ADMIN — DOCUSATE SODIUM 100 MG: 100 CAPSULE, LIQUID FILLED ORAL at 08:10

## 2024-10-26 RX ADMIN — Medication 1 TABLET: at 08:10

## 2024-10-26 RX ADMIN — FOLIC ACID 1 MG: 1 TABLET ORAL at 21:04

## 2024-10-26 RX ADMIN — ROPIVACAINE HYDROCHLORIDE: 5 INJECTION EPIDURAL; INFILTRATION; PERINEURAL at 01:51

## 2024-10-26 RX ADMIN — HEPARIN SODIUM 5000 UNITS: 5000 INJECTION INTRAVENOUS; SUBCUTANEOUS at 21:04

## 2024-10-26 RX ADMIN — FLUTICASONE PROPIONATE 1 SPRAY: 50 SPRAY, METERED NASAL at 08:09

## 2024-10-26 RX ADMIN — ONDANSETRON 4 MG: 2 INJECTION INTRAMUSCULAR; INTRAVENOUS at 17:36

## 2024-10-26 RX ADMIN — HEPARIN SODIUM 5000 UNITS: 5000 INJECTION INTRAVENOUS; SUBCUTANEOUS at 08:10

## 2024-10-26 RX ADMIN — ACETAMINOPHEN 650 MG: 325 TABLET, FILM COATED ORAL at 17:08

## 2024-10-26 RX ADMIN — CALCIUM ACETATE 667 MG: 667 CAPSULE ORAL at 17:08

## 2024-10-26 RX ADMIN — VITAM B12 50 MCG: 100 TAB at 08:10

## 2024-10-26 RX ADMIN — Medication 4000 UNITS: at 08:10

## 2024-10-26 RX ADMIN — MAGNESIUM OXIDE TAB 400 MG (241.3 MG ELEMENTAL MG) 400 MG: 400 (241.3 MG) TAB at 14:19

## 2024-10-26 RX ADMIN — ACETAMINOPHEN 650 MG: 325 TABLET, FILM COATED ORAL at 06:02

## 2024-10-26 RX ADMIN — ROPIVACAINE HYDROCHLORIDE: 5 INJECTION EPIDURAL; INFILTRATION; PERINEURAL at 14:19

## 2024-10-26 RX ADMIN — DEXTROSE, SODIUM CHLORIDE, SODIUM LACTATE, POTASSIUM CHLORIDE, AND CALCIUM CHLORIDE 75 ML/HR: 5; .6; .31; .03; .02 INJECTION, SOLUTION INTRAVENOUS at 21:17

## 2024-10-26 RX ADMIN — DOCUSATE SODIUM 100 MG: 100 CAPSULE, LIQUID FILLED ORAL at 17:08

## 2024-10-26 RX ADMIN — LORATADINE 10 MG: 10 TABLET ORAL at 08:10

## 2024-10-26 RX ADMIN — B-COMPLEX W/ C & FOLIC ACID TAB 1 TABLET: TAB at 08:09

## 2024-10-26 RX ADMIN — ONDANSETRON 4 MG: 2 INJECTION INTRAMUSCULAR; INTRAVENOUS at 11:01

## 2024-10-26 RX ADMIN — VENLAFAXINE HYDROCHLORIDE 37.5 MG: 37.5 CAPSULE, EXTENDED RELEASE ORAL at 08:10

## 2024-10-26 RX ADMIN — ARIPIPRAZOLE 20 MG: 10 TABLET ORAL at 08:09

## 2024-10-26 RX ADMIN — DEXTROSE, SODIUM CHLORIDE, SODIUM LACTATE, POTASSIUM CHLORIDE, AND CALCIUM CHLORIDE 125 ML/HR: 5; .6; .31; .03; .02 INJECTION, SOLUTION INTRAVENOUS at 02:21

## 2024-10-26 RX ADMIN — CALCIUM ACETATE 667 MG: 667 CAPSULE ORAL at 08:10

## 2024-10-26 RX ADMIN — ACETAMINOPHEN 650 MG: 325 TABLET, FILM COATED ORAL at 00:20

## 2024-10-26 NOTE — PROGRESS NOTES
Progress Note - Acute Pain   Name: Ragini Melendez 69 y.o. female I MRN: [id number]  Unit/Bed#: -01 I Date of Admission: 10/24/2024   Date of Service: 10/26/2024 I Hospital Day: 2    Assessment & Plan  Bilateral hydronephrosis  Bilateral ureteral strictures 2/2 radiation for recurrent endometrial cancer with bilateral PCNs in place   S/p ileal conduit, bilateral ureterolysis on 10/24/24    Thoracic epidural placed on 10/24  No evidence of excessive sympathectomy-induced hypotension/pressor requirements/abnormal sensorimotor deficits. Epidural site c/d/I.     Plan:  Continue thoracic epidural 0.1% ropivacaine/2mcg/ml fentanyl at 12/7/10/4  Tylenol 650 mg q6 scheduled  IV dilaudid 0.5mg q1 PRN  Endometrial cancer (HCC)    CKD (chronic kidney disease) stage 4, GFR 15-29 ml/min ([facility])  Lab Results   Component Value Date    EGFR 22 10/26/2024    EGFR 21 10/25/2024    EGFR 19 10/24/2024    CREATININE 2.17 (H) 10/26/2024    CREATININE 2.27 (H) 10/25/2024    CREATININE 2.46 (H) 10/24/2024       [facility] will continue to follow. Please contact Acute Pain Service - via SecureChat from [phone] with additional questions or concerns. See [employer] or [facility] for additional contacts and after hours information.     Subjective   Ragini Melendez is a 69 y.o. female seen lying in bed this morning and more comfortable since increasing her PCEA settings yesterday. She is looking forward to getting OOBTC and I advised she push for a demand bolus approximately 20 minutes prior to activity.     Pain History  Current pain location(s):  Pain Score: 7  Pain Location/Orientation: Location: Incision  Pain Scale: Pain Assessment Tool: 0-10    Meds/Allergies   all current active meds have been reviewed  Allergies   Allergen Reactions    Cephalosporins Rash     Which Cephalosporin reaction was to not specified; however, has tolerated Amoxicillin, Cefazolin, and Cefepime     Objective :  Temp:  [97.3 °F (36.3 °C)-98.5 °F (36.9 °C)] 97.3 °F  (36.3 °C)  HR:  [77-93] 77  BP: (112-137)/(67-78) 112/67  Resp:  [16] 16  SpO2:  [94 %-97 %] 97 %  O2 Device: None (Room air)    Physical Exam  Vitals and nursing note reviewed.   Constitutional:       Appearance: Normal appearance. She is normal weight.   HENT:      Head: Normocephalic and atraumatic.      Right Ear: External ear normal.      Left Ear: External ear normal.      Nose: Nose normal.      Mouth/Throat:      Mouth: Mucous membranes are moist.      Pharynx: Oropharynx is clear.   Eyes:      Conjunctiva/sclera: Conjunctivae normal.   Cardiovascular:      Rate and Rhythm: Normal rate and regular rhythm.      Pulses: Normal pulses.      Heart sounds: Normal heart sounds.   Pulmonary:      Effort: Pulmonary effort is normal.      Breath sounds: Normal breath sounds.   Abdominal:      General: Abdomen is flat. Bowel sounds are normal.      Palpations: Abdomen is soft.      Tenderness: There is abdominal tenderness.   Musculoskeletal:         General: Normal range of motion.      Cervical back: Normal range of motion and neck supple.   Skin:     General: Skin is warm and dry.      Capillary Refill: Capillary refill takes less than 2 seconds.   Neurological:      General: No focal deficit present.      Mental Status: She is alert and oriented to person, place, and time. Mental status is at baseline.   Psychiatric:         Mood and Affect: Mood normal.      Epidural: Site clean/dry/intact, no surrounding erythema/edema/induration, infusion functioning appropriately        Lab Results: I have reviewed the following results:  Estimated Creatinine Clearance: 22.2 mL/min (A) (by C-G formula based on SCr of 2.17 mg/dL (H)).  Lab Results   Component Value Date    WBC 10.33 (H) 10/26/2024    WBC 9.2 10/27/2017    HGB 9.1 (L) 10/26/2024    HGB 12.5 10/27/2017    HCT 28.8 (L) 10/26/2024    HCT 37.4 10/27/2017     10/26/2024     10/27/2017         Component Value Date/Time     10/27/2017 0709    K 4.4  10/26/2024 0501    K 4.2 11/01/2018 0744     10/26/2024 0501     11/01/2018 0744    CO2 23 10/26/2024 0501    CO2 24 11/01/2018 0744    BUN 31 (H) 10/26/2024 0501    BUN 12 11/01/2018 0744    CREATININE 2.17 (H) 10/26/2024 0501    CREATININE 0.55 10/31/2019 0430         Component Value Date/Time    CALCIUM 8.7 10/26/2024 0501    CALCIUM 9.4 10/27/2017 0709    ALKPHOS 102 10/14/2024 1137    ALKPHOS 104 10/27/2017 0709    AST 19 10/14/2024 1137    AST 12 11/01/2018 0744    ALT 20 10/14/2024 1137    ALT 14 11/01/2018 0744    BILITOT 0.3 10/27/2017 0709    TP 7.7 10/14/2024 1137    TP 7.1 10/14/2024 1137    TP 7.0 11/01/2018 0744    ALB 3.8 10/14/2024 1137    ALB 3.8 10/14/2024 1137    ALB 4.3 10/27/2017 0709

## 2024-10-26 NOTE — ASSESSMENT & PLAN NOTE
Bilateral ureteral strictures 2/2 radiation for recurrent endometrial cancer with bilateral PCNs in place   S/p ileal conduit, bilateral ureterolysis on 10/24/24    Thoracic epidural placed on 10/24  No evidence of excessive sympathectomy-induced hypotension/pressor requirements/abnormal sensorimotor deficits. Epidural site c/d/I.     Plan:  Continue thoracic epidural 0.1% ropivacaine/2mcg/ml fentanyl at 12/7/10/4  Tylenol 650 mg q6 scheduled  IV dilaudid 0.5mg q1 PRN

## 2024-10-26 NOTE — PLAN OF CARE
Problem: PAIN - ADULT  Goal: Verbalizes/displays adequate comfort level or baseline comfort level  Description: Interventions:  - Encourage patient to monitor pain and request assistance  - Assess pain using appropriate pain scale  - Administer analgesics based on type and severity of pain and evaluate response  - Implement non-pharmacological measures as appropriate and evaluate response  - Consider cultural and social influences on pain and pain management  - Notify physician/advanced practitioner if interventions unsuccessful or patient reports new pain  Outcome: Progressing     Problem: INFECTION - ADULT  Goal: Absence or prevention of progression during hospitalization  Description: INTERVENTIONS:  - Assess and monitor for signs and symptoms of infection  - Monitor lab/diagnostic results  - Monitor all insertion sites, i.e. indwelling lines, tubes, and drains  - Monitor endotracheal if appropriate and nasal secretions for changes in amount and color  - Dewar appropriate cooling/warming therapies per order  - Administer medications as ordered  - Instruct and encourage patient and family to use good hand hygiene technique  - Identify and instruct in appropriate isolation precautions for identified infection/condition  Outcome: Progressing     Problem: SAFETY ADULT  Goal: Patient will remain free of falls  Description: INTERVENTIONS:  - Educate patient/family on patient safety including physical limitations  - Instruct patient to call for assistance with activity   - Consult OT/PT to assist with strengthening/mobility   - Keep Call bell within reach  - Keep bed low and locked with side rails adjusted as appropriate  - Keep care items and personal belongings within reach  - Initiate and maintain comfort rounds  - Make Fall Risk Sign visible to staff  - Offer Toileting every 2 Hours, in advance of need  - Initiate/Maintain bed alarm  - Obtain necessary fall risk management equipment: yellow socks  - Apply  yellow socks and bracelet for high fall risk patients  - Consider moving patient to room near nurses station  Outcome: Progressing

## 2024-10-26 NOTE — PLAN OF CARE
Problem: PAIN - ADULT  Goal: Verbalizes/displays adequate comfort level or baseline comfort level  Description: Interventions:  - Encourage patient to monitor pain and request assistance  - Assess pain using appropriate pain scale  - Administer analgesics based on type and severity of pain and evaluate response  - Implement non-pharmacological measures as appropriate and evaluate response  - Consider cultural and social influences on pain and pain management  - Notify physician/advanced practitioner if interventions unsuccessful or patient reports new pain  Outcome: Progressing     Problem: INFECTION - ADULT  Goal: Absence or prevention of progression during hospitalization  Description: INTERVENTIONS:  - Assess and monitor for signs and symptoms of infection  - Monitor lab/diagnostic results  - Monitor all insertion sites, i.e. indwelling lines, tubes, and drains  - Monitor endotracheal if appropriate and nasal secretions for changes in amount and color  - Sawyer appropriate cooling/warming therapies per order  - Administer medications as ordered  - Instruct and encourage patient and family to use good hand hygiene technique  - Identify and instruct in appropriate isolation precautions for identified infection/condition  Outcome: Progressing  Goal: Absence of fever/infection during neutropenic period  Description: INTERVENTIONS:  - Monitor WBC    Outcome: Progressing     Problem: SAFETY ADULT  Goal: Patient will remain free of falls  Description: INTERVENTIONS:  - Educate patient/family on patient safety including physical limitations  - Instruct patient to call for assistance with activity   - Consult OT/PT to assist with strengthening/mobility   - Keep Call bell within reach  - Keep bed low and locked with side rails adjusted as appropriate  - Keep care items and personal belongings within reach  - Initiate and maintain comfort rounds  - Make Fall Risk Sign visible to staff  - Offer Toileting every 2 Hours,  in advance of need  - Initiate/Maintain bed alarm  - Apply yellow socks and bracelet for high fall risk patients  - Consider moving patient to room near nurses station  Outcome: Progressing  Goal: Maintain or return to baseline ADL function  Description: INTERVENTIONS:  -  Assess patient's ability to carry out ADLs; assess patient's baseline for ADL function and identify physical deficits which impact ability to perform ADLs (bathing, care of mouth/teeth, toileting, grooming, dressing, etc.)  - Assess/evaluate cause of self-care deficits   - Assess range of motion  - Assess patient's mobility; develop plan if impaired  - Assess patient's need for assistive devices and provide as appropriate  - Encourage maximum independence but intervene and supervise when necessary  - Involve family in performance of ADLs  - Assess for home care needs following discharge   - Consider OT consult to assist with ADL evaluation and planning for discharge  - Provide patient education as appropriate  Outcome: Progressing  Goal: Maintains/Returns to pre admission functional level  Description: INTERVENTIONS:  - Perform AM-PAC 6 Click Basic Mobility/ Daily Activity assessment daily.  - Set and communicate daily mobility goal to care team and patient/family/caregiver.   - Collaborate with rehabilitation services on mobility goals if consulted  - Perform Range of Motion 3 times a day.  - Reposition patient every 2 hours.  - Dangle patient 3 times a day  - Stand patient 3 times a day  - Ambulate patient 3 times a day  - Out of bed to chair 3 times a day   - Out of bed for meals 3 times a day  - Out of bed for toileting  - Record patient progress and toleration of activity level   Outcome: Progressing     Problem: DISCHARGE PLANNING  Goal: Discharge to home or other facility with appropriate resources  Description: INTERVENTIONS:  - Identify barriers to discharge w/patient and caregiver  - Arrange for needed discharge resources and  transportation as appropriate  - Identify discharge learning needs (meds, wound care, etc.)  - Arrange for interpretive services to assist at discharge as needed  - Refer to Case Management Department for coordinating discharge planning if the patient needs post-hospital services based on physician/advanced practitioner order or complex needs related to functional status, cognitive ability, or social support system  Outcome: Progressing     Problem: Knowledge Deficit  Goal: Patient/family/caregiver demonstrates understanding of disease process, treatment plan, medications, and discharge instructions  Description: Complete learning assessment and assess knowledge base.  Interventions:  - Provide teaching at level of understanding  - Provide teaching via preferred learning methods  Outcome: Progressing

## 2024-10-26 NOTE — PROGRESS NOTES
Progress Note - Nephrology   Name: Ragini Melendez 69 y.o. female I MRN: [id number]  Unit/Bed#: -01 I Date of Admission: 10/24/2024   Date of Service: 10/26/2024 I Hospital Day: 2    Assessment & Plan  CKD (chronic kidney disease) stage 4, GFR 15-29 ml/min ([facility])  Etiology: suspect secondary to hypertension, obstructive uropathy in the setting of bilateral ureteral strictures, underlying glomerular pathology as well as age-related nephron loss  Her creatinine remains within her baseline, she remains on intravenous fluids she has not eaten anything and can maintain on IV fluids until tolerating p.o.  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    Subjective   Brief History of Admission -endometrial cancer, bilateral hydronephrosis, stage IV chronic kidney disease    Patient's renal function remained stable, she is still in a considerable amount of pain, she states her appetite has been poor, not tolerating much p.o. intake    Objective :  Temp:  [97.3 °F (36.3 °C)-98.5 °F (36.9 °C)] 97.3 °F (36.3 °C)  HR:  [77-93] 77  BP:  (112-137)/(67-78) 112/67  Resp:  [16] 16  SpO2:  [94 %-97 %] 97 %  O2 Device: None (Room air)    Current Weight: Weight - Scale: 71.7 kg (158 lb)  First Weight: Weight - Scale: 71.7 kg (158 lb)  I/O         10/24 0701  10/25 0700 10/25 0701  10/26 0700 10/26 0701  10/27 0700    P.O. 0 480     I.V. (mL/kg) 2686 (37.5) 3842.6 (53.6) 281.1 (3.9)    IV Piggyback 200      Total Intake(mL/kg) 2886 (40.3) 4322.6 (60.3) 281.1 (3.9)    Urine (mL/kg/hr) 900 (0.5) 800 (0.5) 2075 (8.2)    Drains 190 40     Total Output 6652 211 0531    Net +1796 +3482.6 -1794                 Physical Exam  Vitals and nursing note reviewed.   Constitutional:       General: She is not in acute distress.     Appearance: She is well-developed.   HENT:      Head: Normocephalic and atraumatic.   Eyes:      Conjunctiva/sclera: Conjunctivae normal.   Cardiovascular:      Rate and Rhythm: Normal rate and regular rhythm.      Heart sounds: No murmur heard.  Pulmonary:      Effort: Pulmonary effort is normal. No respiratory distress.      Breath sounds: Normal breath sounds.   Abdominal:      Palpations: Abdomen is soft.      Tenderness: There is no abdominal tenderness.   Musculoskeletal:         General: No swelling.      Cervical back: Neck supple.   Skin:     General: Skin is warm and dry.      Capillary Refill: Capillary refill takes less than 2 seconds.   Neurological:      Mental Status: She is alert.   Psychiatric:         Mood and Affect: Mood normal.       Medications:    Current Facility-Administered Medications:     acetaminophen (TYLENOL) tablet 650 mg, 650 mg, Oral, Q6H [facility], 650 mg at 10/26/24 0602    ARIPiprazole (ABILIFY) tablet 20 mg, 20 mg, Oral, QA, Demetrius Lowe MD, 20 mg at 10/26/24 0809    calcitriol (ROCALTROL) capsule 0.25 mcg, 0.25 mcg, Oral, Once per day on Monday Wednesday Friday, Demetrius Lowe MD, 0.25 mcg at 10/25/24 0853    calcium acetate (PHOSLO) capsule 667 mg, 667 mg, Oral,  TID With Meals, Demetrius Lowe MD, 667 mg at 10/26/24 0810    calcium carbonate-vitamin D 500 mg-5 mcg tablet 1 tablet, 1 tablet, Oral, Daily With Breakfast, Demetrius Lowe MD, 1 tablet at 10/26/24 0810    Cholecalciferol (VITAMIN D3) tablet 4,000 Units, 4,000 Units, Oral, Daily, Demetrius Lowe MD, 4,000 Units at 10/26/24 0810    cyanocobalamin (VITAMIN B-12) tablet 50 mcg, 50 mcg, Oral, Daily, Demetrius Lowe MD, 50 mcg at 10/26/24 0810    dextrose 5 % in lactated Ringer's infusion, 125 mL/hr, Intravenous, Continuous, Demetrius Lowe MD, Last Rate: 125 mL/hr at 10/26/24 0221, 125 mL/hr at 10/26/24 0221    docusate sodium (COLACE) capsule 100 mg, 100 mg, Oral, BID, Demetrius Lowe MD, 100 mg at 10/26/24 0810    fluticasone (FLONASE) 50 mcg/act nasal spray 1 spray, 1 spray, Nasal, Daily, Demetrius Lowe MD, 1 spray at 10/26/24 0809    folic acid (FOLVITE) tablet 1 mg, 1 mg, Oral, HS, Demetrius Lowe MD, 1 mg at 10/25/24 2148    heparin (porcine) subcutaneous injection 5,000 Units, 5,000 Units, Subcutaneous, Q12H CHRISTIANO, 5,000 Units at 10/26/24 0810 **AND** [COMPLETED] Platelet count, , , Once, Demetrius Lowe MD    HYDROmorphone (DILAUDID) injection 0.5 mg, 0.5 mg, Intravenous, Q1H PRN, Vaishali Amanda MD    loratadine (CLARITIN) tablet 10 mg, 10 mg, Oral, Daily, Demetrius Lowe MD, 10 mg at 10/26/24 0810    multivitamin stress formula tablet 1 tablet, 1 tablet, Oral, QAM, Demetrius Lowe MD, 1 tablet at 10/26/24 0809    naloxone (NARCAN) injection 0.1 mg, 0.1 mg, Intravenous, Q1MIN PRN, Vaishali Amanda MD    ondansetron (ZOFRAN) injection 4 mg, 4 mg, Intravenous, Q6H PRN, Demetrius Lowe MD, 4 mg at 10/24/24 1439    pantoprazole (PROTONIX) EC tablet 20 mg, 20 mg, Oral, Early Morning, Demetrius Lowe MD, 20 mg at 10/26/24 0602    ropivacaine 0.1% and fentaNYL 2 mcg/mL PCEA, , Epidural, Continuous, Thomas Begum MD, Rate Verify  "at 10/26/24 0735    venlafaxine (EFFEXOR-XR) 24 hr capsule 37.5 mg, 37.5 mg, Oral, Daily, Demetrius Lowe MD, 37.5 mg at 10/26/24 0810      Lab Results: I have reviewed the following results:  Results from last 7 days   Lab Units 10/26/24  0501 10/25/24  0432 10/24/24  1652 10/24/24  1328   WBC Thousand/uL 10.33* 12.10*  --  23.28*   HEMOGLOBIN g/dL 9.1* 10.1*  --  11.7   HEMATOCRIT % 28.8* 31.7*  --  37.4   PLATELETS Thousands/uL 185 255 278 286   POTASSIUM mmol/L 4.4 4.7  --  4.5   CHLORIDE mmol/L 107 104  --  103   CO2 mmol/L 23 20*  --  20*   BUN mg/dL 31* 40*  --  42*   CREATININE mg/dL 2.17* 2.27*  --  2.46*   CALCIUM mg/dL 8.7 7.8*  --  8.2*   MAGNESIUM mg/dL 1.6*  --   --   --        Administrative Statements     Portions of the record may have been created with voice recognition software. Occasional wrong word or \"sound a like\" substitutions may have occurred due to the inherent limitations of voice recognition software. Read the chart carefully and recognize, using context, where substitutions have occurred.If you have any questions, please contact the dictating provider.  "

## 2024-10-26 NOTE — ASSESSMENT & PLAN NOTE
Etiology: suspect secondary to hypertension, obstructive uropathy in the setting of bilateral ureteral strictures, underlying glomerular pathology as well as age-related nephron loss  Her creatinine remains within her baseline, she remains on intravenous fluids she has not eaten anything and can maintain on IV fluids until tolerating p.o.  Discussed with urology will decrease D5 LR to 75 cc an hour to avoid volume overload, currently has a positive fluid balance, will monitor this closely in the setting of postop care and most recent procedure  Once tolerating diet well will discontinue IV fluids and monitor urine output

## 2024-10-26 NOTE — PROGRESS NOTES
UROLOGY PROGRESS NOTE   Patient Identifiers: Ragini Melendez (MRN [id number])  Date of Service: 10/26/2024    Subjective:     Afebrile, vital signs stable    Tolerating clears    Objective:     VITALS:    Vitals:    10/26/24 0727   BP: 112/67   Pulse: 77   Resp: 16   Temp: (!) 97.3 °F (36.3 °C)   SpO2: 97%       INS & OUTS:  I/O last 24 hours:  In: 4701.4 [P.O.:480; I.V.:4221.4]  Out: 2915 [Urine:2875; Drains:40]    LABS:  Lab Results   Component Value Date    HGB 9.1 (L) 10/26/2024    HCT 28.8 (L) 10/26/2024    WBC 10.33 (H) 10/26/2024     10/26/2024   ]    Lab Results   Component Value Date     10/27/2017    K 4.4 10/26/2024     10/26/2024    CO2 23 10/26/2024    BUN 31 (H) 10/26/2024    CREATININE 2.17 (H) 10/26/2024    CALCIUM 8.7 10/26/2024    GLUCOSE 219 (H) 12/19/2017   ]    INPATIENT MEDS:    Current Facility-Administered Medications:     acetaminophen (TYLENOL) tablet 650 mg, 650 mg, Oral, Q6H CHRISTIANO, Demetrius Lowe MD, 650 mg at 10/26/24 1209    ARIPiprazole (ABILIFY) tablet 20 mg, 20 mg, Oral, QAM, Demetrius Lowe MD, 20 mg at 10/26/24 0809    calcitriol (ROCALTROL) capsule 0.25 mcg, 0.25 mcg, Oral, Once per day on Monday Wednesday Friday, Demetrius Lowe MD, 0.25 mcg at 10/25/24 0853    calcium acetate (PHOSLO) capsule 667 mg, 667 mg, Oral, TID With Meals, Demetrius Lowe MD, 667 mg at 10/26/24 1209    calcium carbonate-vitamin D 500 mg-5 mcg tablet 1 tablet, 1 tablet, Oral, Daily With Breakfast, Demetrius Lowe MD, 1 tablet at 10/26/24 0810    Cholecalciferol (VITAMIN D3) tablet 4,000 Units, 4,000 Units, Oral, Daily, Demetrius Lowe MD, 4,000 Units at 10/26/24 0810    cyanocobalamin (VITAMIN B-12) tablet 50 mcg, 50 mcg, Oral, Daily, Demetrius Lowe MD, 50 mcg at 10/26/24 0810    dextrose 5 % in lactated Ringer's infusion, 75 mL/hr, Intravenous, Continuous, Wilton Multani DO, Last Rate: 75 mL/hr at 10/26/24 1038, 75 mL/hr at 10/26/24  1038    docusate sodium (COLACE) capsule 100 mg, 100 mg, Oral, BID, Demetrius Lowe MD, 100 mg at 10/26/24 0810    fluticasone (FLONASE) 50 mcg/act nasal spray 1 spray, 1 spray, Nasal, Daily, Demetrius Lowe MD, 1 spray at 10/26/24 0809    folic acid (FOLVITE) tablet 1 mg, 1 mg, Oral, HS, Demetrius Lowe MD, 1 mg at 10/25/24 2148    heparin (porcine) subcutaneous injection 5,000 Units, 5,000 Units, Subcutaneous, Q12H CHRISTIANO, 5,000 Units at 10/26/24 0810 **AND** [COMPLETED] Platelet count, , , Once, Demetrius Lowe MD    HYDROmorphone (DILAUDID) injection 0.5 mg, 0.5 mg, Intravenous, Q1H PRN, Vaishali Amanda MD    loratadine (CLARITIN) tablet 10 mg, 10 mg, Oral, Daily, Demetrius Lowe MD, 10 mg at 10/26/24 0810    multivitamin stress formula tablet 1 tablet, 1 tablet, Oral, QAM, Demetrius Lowe MD, 1 tablet at 10/26/24 0809    naloxone (NARCAN) injection 0.1 mg, 0.1 mg, Intravenous, Q1MIN PRN, Vaishali Amanda MD    ondansetron (ZOFRAN) injection 4 mg, 4 mg, Intravenous, Q6H PRN, Demetrius Lowe MD, 4 mg at 10/26/24 1101    pantoprazole (PROTONIX) EC tablet 20 mg, 20 mg, Oral, Early Morning, Demetrius Lowe MD, 20 mg at 10/26/24 0602    ropivacaine 0.1% and fentaNYL 2 mcg/mL PCEA, , Epidural, Continuous, Thomas Begum MD, New Bag at 10/26/24 1419    venlafaxine (EFFEXOR-XR) 24 hr capsule 37.5 mg, 37.5 mg, Oral, Daily, Demetrius Lowe MD, 37.5 mg at 10/26/24 0810      Physical Exam:     GEN: alert and oriented x 3    RESP: breathing comfortably with no accessory muscle use    ABD:  soft, appropriately tender near incisions    INCISION:  midline dressing intact     EXT: Negative swelling bilateral lower extremities; left upper extremity more swollen compared to the right  DRAINS: SUSAN drain with serosanguineous output  Urostomy pink and healthy appearing with 2 externalized stents making clear yellow urine  Bilateral nephrostomy tubes capped      Assessment:      Status post ileal conduit urinary diversion secondary to radiation strictures of bilateral distal ureters on 10/24/2024    As of 10/26/2024 PM rounds, patient tolerating clears, passing flatus.      Afebrile, vital signs stable overnight    Labs 10/26/2024: White count 10.33, hemoglobin 9.1, creatinine 2.17    Made over 2L of urostomy urine for this shift    SUSAN drain with 25 cc serosang output for this shift    Plan:   -Pain team following and managing postoperative pain with thoracic epidural, Tylenol, IV Dilaudid  -Nephrology following.  Appreciate recommendations.  -PT ordered, OOB, ambulate  -Incentive spirometer  -Trend daily labs  -Will advance to soft diet  -Will monitor left upper extremity swelling.  Appears to have been infiltrated line.  She will keep the area elevated.  Advised her to look out for worsening swelling or pain in this extremity.  If so, we will need to get Doppler.  -Having some leakage around L capped PCN site. Given the fact that she is making good urine from urostomy, will hold off on uncapping the tube for now. Will continue to monitor.

## 2024-10-26 NOTE — ASSESSMENT & PLAN NOTE
Lab Results   Component Value Date    EGFR 22 10/26/2024    EGFR 21 10/25/2024    EGFR 19 10/24/2024    CREATININE 2.17 (H) 10/26/2024    CREATININE 2.27 (H) 10/25/2024    CREATININE 2.46 (H) 10/24/2024

## 2024-10-27 LAB
ANION GAP SERPL CALCULATED.3IONS-SCNC: 6 MMOL/L (ref 4–13)
BUN SERPL-MCNC: 28 MG/DL (ref 5–25)
CALCIUM SERPL-MCNC: 8.6 MG/DL (ref 8.4–10.2)
CHLORIDE SERPL-SCNC: 108 MMOL/L (ref 96–108)
CO2 SERPL-SCNC: 24 MMOL/L (ref 21–32)
CREAT SERPL-MCNC: 2.05 MG/DL (ref 0.6–1.3)
ERYTHROCYTE [DISTWIDTH] IN BLOOD BY AUTOMATED COUNT: 13.2 % (ref 11.6–15.1)
GFR SERPL CREATININE-BSD FRML MDRD: 24 ML/MIN/1.73SQ M
GLUCOSE SERPL-MCNC: 103 MG/DL (ref 65–140)
HCT VFR BLD AUTO: 25.4 % (ref 34.8–46.1)
HGB BLD-MCNC: 7.9 G/DL (ref 11.5–15.4)
MCH RBC QN AUTO: 29.3 PG (ref 26.8–34.3)
MCHC RBC AUTO-ENTMCNC: 31.1 G/DL (ref 31.4–37.4)
MCV RBC AUTO: 94 FL (ref 82–98)
PLATELET # BLD AUTO: 180 THOUSANDS/UL (ref 149–390)
PMV BLD AUTO: 10.4 FL (ref 8.9–12.7)
POTASSIUM SERPL-SCNC: 4.4 MMOL/L (ref 3.5–5.3)
RBC # BLD AUTO: 2.7 MILLION/UL (ref 3.81–5.12)
SODIUM SERPL-SCNC: 138 MMOL/L (ref 135–147)
WBC # BLD AUTO: 9.35 THOUSAND/UL (ref 4.31–10.16)

## 2024-10-27 PROCEDURE — 97163 PT EVAL HIGH COMPLEX 45 MIN: CPT

## 2024-10-27 PROCEDURE — 99232 SBSQ HOSP IP/OBS MODERATE 35: CPT | Performed by: ANESTHESIOLOGY

## 2024-10-27 PROCEDURE — 99232 SBSQ HOSP IP/OBS MODERATE 35: CPT | Performed by: INTERNAL MEDICINE

## 2024-10-27 PROCEDURE — 85027 COMPLETE CBC AUTOMATED: CPT | Performed by: NURSE PRACTITIONER

## 2024-10-27 PROCEDURE — 99024 POSTOP FOLLOW-UP VISIT: CPT | Performed by: UROLOGY

## 2024-10-27 PROCEDURE — 80048 BASIC METABOLIC PNL TOTAL CA: CPT | Performed by: NURSE PRACTITIONER

## 2024-10-27 RX ORDER — HYDROMORPHONE HYDROCHLORIDE 2 MG/1
2 TABLET ORAL EVERY 4 HOURS PRN
Status: DISCONTINUED | OUTPATIENT
Start: 2024-10-27 | End: 2024-10-27

## 2024-10-27 RX ORDER — OXYCODONE HYDROCHLORIDE 5 MG/1
5 TABLET ORAL EVERY 8 HOURS PRN
Status: DISCONTINUED | OUTPATIENT
Start: 2024-10-27 | End: 2024-10-27

## 2024-10-27 RX ORDER — METHOCARBAMOL 750 MG/1
750 TABLET, FILM COATED ORAL EVERY 6 HOURS SCHEDULED
Status: DISCONTINUED | OUTPATIENT
Start: 2024-10-27 | End: 2024-10-30 | Stop reason: HOSPADM

## 2024-10-27 RX ORDER — HYDROMORPHONE HCL/PF 1 MG/ML
0.5 SYRINGE (ML) INJECTION
Status: DISCONTINUED | OUTPATIENT
Start: 2024-10-27 | End: 2024-10-30 | Stop reason: HOSPADM

## 2024-10-27 RX ORDER — HYDROMORPHONE HYDROCHLORIDE 2 MG/1
2 TABLET ORAL EVERY 4 HOURS PRN
Status: DISCONTINUED | OUTPATIENT
Start: 2024-10-27 | End: 2024-10-30 | Stop reason: HOSPADM

## 2024-10-27 RX ORDER — HYDROMORPHONE HYDROCHLORIDE 4 MG/1
4 TABLET ORAL EVERY 4 HOURS PRN
Status: DISCONTINUED | OUTPATIENT
Start: 2024-10-27 | End: 2024-10-30 | Stop reason: HOSPADM

## 2024-10-27 RX ADMIN — VITAM B12 50 MCG: 100 TAB at 08:28

## 2024-10-27 RX ADMIN — METHOCARBAMOL 750 MG: 750 TABLET ORAL at 11:23

## 2024-10-27 RX ADMIN — DOCUSATE SODIUM 100 MG: 100 CAPSULE, LIQUID FILLED ORAL at 08:28

## 2024-10-27 RX ADMIN — FOLIC ACID 1 MG: 1 TABLET ORAL at 21:24

## 2024-10-27 RX ADMIN — ARIPIPRAZOLE 20 MG: 10 TABLET ORAL at 08:27

## 2024-10-27 RX ADMIN — CALCIUM ACETATE 667 MG: 667 CAPSULE ORAL at 11:23

## 2024-10-27 RX ADMIN — HEPARIN SODIUM 5000 UNITS: 5000 INJECTION INTRAVENOUS; SUBCUTANEOUS at 21:24

## 2024-10-27 RX ADMIN — HEPARIN SODIUM 5000 UNITS: 5000 INJECTION INTRAVENOUS; SUBCUTANEOUS at 09:48

## 2024-10-27 RX ADMIN — Medication 4000 UNITS: at 08:27

## 2024-10-27 RX ADMIN — Medication 1 TABLET: at 08:27

## 2024-10-27 RX ADMIN — ACETAMINOPHEN 650 MG: 325 TABLET, FILM COATED ORAL at 05:08

## 2024-10-27 RX ADMIN — METHOCARBAMOL 750 MG: 750 TABLET ORAL at 17:53

## 2024-10-27 RX ADMIN — ACETAMINOPHEN 650 MG: 325 TABLET, FILM COATED ORAL at 17:53

## 2024-10-27 RX ADMIN — CALCIUM ACETATE 667 MG: 667 CAPSULE ORAL at 17:53

## 2024-10-27 RX ADMIN — VENLAFAXINE HYDROCHLORIDE 37.5 MG: 37.5 CAPSULE, EXTENDED RELEASE ORAL at 08:27

## 2024-10-27 RX ADMIN — PANTOPRAZOLE SODIUM 20 MG: 20 TABLET, DELAYED RELEASE ORAL at 05:08

## 2024-10-27 RX ADMIN — DOCUSATE SODIUM 100 MG: 100 CAPSULE, LIQUID FILLED ORAL at 17:53

## 2024-10-27 RX ADMIN — ACETAMINOPHEN 650 MG: 325 TABLET, FILM COATED ORAL at 21:24

## 2024-10-27 RX ADMIN — LORATADINE 10 MG: 10 TABLET ORAL at 08:27

## 2024-10-27 RX ADMIN — FLUTICASONE PROPIONATE 1 SPRAY: 50 SPRAY, METERED NASAL at 08:27

## 2024-10-27 RX ADMIN — B-COMPLEX W/ C & FOLIC ACID TAB 1 TABLET: TAB at 08:28

## 2024-10-27 RX ADMIN — METHOCARBAMOL 750 MG: 750 TABLET ORAL at 21:24

## 2024-10-27 RX ADMIN — ACETAMINOPHEN 650 MG: 325 TABLET, FILM COATED ORAL at 11:23

## 2024-10-27 RX ADMIN — CALCIUM ACETATE 667 MG: 667 CAPSULE ORAL at 08:28

## 2024-10-27 RX ADMIN — DEXTROSE, SODIUM CHLORIDE, SODIUM LACTATE, POTASSIUM CHLORIDE, AND CALCIUM CHLORIDE 75 ML/HR: 5; .6; .31; .03; .02 INJECTION, SOLUTION INTRAVENOUS at 11:49

## 2024-10-27 NOTE — PROGRESS NOTES
[facility]   Phone: [phone]    Fax: [phone]      Physical Therapy Treatment Note/ Progress Report:       Date:  2023    Patient Name:  Rafat De Oliveira    :  1961  MRN: [id number]  Restrictions/Precautions:  recent TKA, history of DVT, hx of infection;  Medical/Treatment Diagnosis Information:  Diagnosis: M17.0 Bilat primary OA knee; M25.662 decreased ROM L knee; Z98.890 s/p L knee TKA ; R22.4 - localized swelling of lower limb   Treating Diagnosis: dec ROM, pain in L knee, weakness, gt deviations;   DOS 23 L knee TKA  Insurance/Certification information:  PT Insurance Information: Felipe  Physician Information:  Referring Practitioner: Dr. Shirley Gonzales  Has the plan of care been signed (Y/N):        []  Yes  [x]  No     Date of Patient follow up with Physician: 2023      Is this a Progress Report:     []  Yes  [x]  No        If Yes:  Date Range for reporting period:  Beginnin23  Ending    Progress report will be due (10 Rx or 30 days whichever is less): 6/15/50       Recertification will be due (POC Duration  / 90 days whichever is less): 23         Visit # Insurance Allowable Auth Required   10 BMN []  Yes []  No    He has had 16 visits earlier this year for prehab Rx    Functional Scale: LEFS 30%    Date assessed:  23      Latex Allergy:  [x]NO      []YES  Preferred Language for Healthcare:   [x]English       []other:    Pain level:  2-3/10      SUBJECTIVE: 5+ weeks post op  Patient reports that he has been having a pretty good week. Swelling has been minimal.   He is riding his bike at home about 20 minutes at a time and feels very good with this.         OBJECTIVE:    Observation:     Flexibility L R Comment   Hamstring Min tightness  2023   Gastroc Min tightness      ITB        Quad                         ROM  2023 LEFT RIGHT   HIP Flex       HIP Abd       HIP Ext       HIP IR       HIP ER       Knee ext 0 Progress Note - Acute Pain   Name: Ragini Melendez 69 y.o. female I MRN: [id number]  Unit/Bed#: -01 I Date of Admission: 10/24/2024   Date of Service: 10/27/2024 I Hospital Day: 3    Assessment & Plan  Bilateral hydronephrosis  Bilateral ureteral strictures 2/2 radiation for recurrent endometrial cancer with bilateral PCNs in place   S/p ileal conduit, bilateral ureterolysis on 10/24/24    Thoracic epidural placed on 10/24    Patient tolerating soft diet, discussed that we will remove epidural this morning and advance to a PO pain regimen    Plan:  Discontinue PCEA  Epidural removed, tip intact, site is clean and not tender to palpation  Start PO dilaudid 2-4mg q4h PRN for moderate-severe pain  Tylenol 650 mg q6 scheduled  IV dilaudid 0.5mg q1h PRN for breakthrough pain  Start robaxin 750mg q6h scheduled  Endometrial cancer ([facility])    CKD (chronic kidney disease) stage 4, GFR 15-29 ml/min ([facility])  Lab Results   Component Value Date    EGFR 24 10/27/2024    EGFR 22 10/26/2024    EGFR 21 10/25/2024    CREATININE 2.05 (H) 10/27/2024    CREATININE 2.17 (H) 10/26/2024    CREATININE 2.27 (H) 10/25/2024       APS will continue to follow. Please contact Acute Pain Service - via SecureChat from [phone] with additional questions or concerns. See SecureChat or Vault Dragon for additional contacts and after hours information.     Subjective   Ragini Melendez is a 69 y.o. female lying in bed this morning comfortably. She states she spent most of the day yesterday in her chair and has been feeling well. I discussed removing her epidural this morning and she agreed. She states oxycodone made her feel disoriented in the past so we will start oral dilaudid for pain.    Pain History  Current pain location(s):  Pain Score: 0  Pain Location/Orientation: Location: Incision, Location: Pelvis  Pain Scale: Pain Assessment Tool: 0-10    Meds/Allergies   all current active meds have been reviewed  Allergies   Allergen Reactions     Cephalosporins Rash     Which Cephalosporin reaction was to not specified; however, has tolerated Amoxicillin, Cefazolin, and Cefepime     Objective :  Temp:  [98.1 °F (36.7 °C)-98.8 °F (37.1 °C)] 98.8 °F (37.1 °C)  HR:  [75-85] 75  BP: (120-128)/(66-75) 128/69  Resp:  [16-18] 16  SpO2:  [94 %-98 %] 95 %  O2 Device: None (Room air)    Physical Exam  Vitals and nursing note reviewed.   Constitutional:       Appearance: Normal appearance. She is normal weight.   HENT:      Head: Normocephalic and atraumatic.      Right Ear: External ear normal.      Left Ear: External ear normal.      Nose: Nose normal.      Mouth/Throat:      Mouth: Mucous membranes are moist.      Pharynx: Oropharynx is clear.   Eyes:      Conjunctiva/sclera: Conjunctivae normal.   Cardiovascular:      Rate and Rhythm: Normal rate and regular rhythm.      Pulses: Normal pulses.      Heart sounds: Normal heart sounds.   Pulmonary:      Effort: Pulmonary effort is normal.      Breath sounds: Normal breath sounds.   Abdominal:      General: Abdomen is flat. Bowel sounds are normal.      Palpations: Abdomen is soft.   Musculoskeletal:         General: Normal range of motion.      Cervical back: Normal range of motion and neck supple.   Skin:     General: Skin is warm and dry.      Capillary Refill: Capillary refill takes less than 2 seconds.   Neurological:      General: No focal deficit present.      Mental Status: She is alert and oriented to person, place, and time. Mental status is at baseline.   Psychiatric:         Mood and Affect: Mood normal.        Epidural: Catheter removed, tip intact, site clean and not tender to palpation      Lab Results: I have reviewed the following results:  Estimated Creatinine Clearance: 23.5 mL/min (A) (by C-G formula based on SCr of 2.05 mg/dL (H)).  Lab Results   Component Value Date    WBC 9.35 10/27/2024    WBC 9.2 10/27/2017    HGB 7.9 (L) 10/27/2024    HGB 12.5 10/27/2017    HCT 25.4 (L) 10/27/2024    HCT 37.4  10/27/2017     10/27/2024     10/27/2017         Component Value Date/Time     10/27/2017 0709    K 4.4 10/27/2024 0521    K 4.2 11/01/2018 0744     10/27/2024 0521     11/01/2018 0744    CO2 24 10/27/2024 0521    CO2 24 11/01/2018 0744    BUN 28 (H) 10/27/2024 0521    BUN 12 11/01/2018 0744    CREATININE 2.05 (H) 10/27/2024 0521    CREATININE 0.55 10/31/2019 0430         Component Value Date/Time    CALCIUM 8.6 10/27/2024 0521    CALCIUM 9.4 10/27/2017 0709    ALKPHOS 102 10/14/2024 1137    ALKPHOS 104 10/27/2017 0709    AST 19 10/14/2024 1137    AST 12 11/01/2018 0744    ALT 20 10/14/2024 1137    ALT 14 11/01/2018 0744    BILITOT 0.3 10/27/2017 0709    TP 7.7 10/14/2024 1137    TP 7.1 10/14/2024 1137    TP 7.0 11/01/2018 0744    ALB 3.8 10/14/2024 1137    ALB 3.8 10/14/2024 1137    ALB 4.3 10/27/2017 0709

## 2024-10-27 NOTE — PLAN OF CARE
Problem: PHYSICAL THERAPY ADULT  Goal: Performs mobility at highest level of function for planned discharge setting.  See evaluation for individualized goals.  Description: Treatment/Interventions: Functional transfer training, Elevations, LE strengthening/ROM, Therapeutic exercise, Endurance training, Equipment eval/education, Bed mobility, Gait training, Patient/family training          See flowsheet documentation for full assessment, interventions and recommendations.  Note: Prognosis: Good  Problem List: Decreased strength, Decreased endurance, Impaired balance, Decreased mobility, Pain  Assessment: Pt seen for physical therapy evaluation.  Pt is a 70 y/o female w/ history/comorbidities of uterine CA, CKD, depression, gastric bypass, HTN, HLD who is now admitted w/ B ureteral strictures for elective ileal conduit,, B uretrolysis 10/24/24.  Due to acute medical issues, acute procedure, pain, fall risk, note unstable clinical picture.  PT consulted to assess post op mobility, d/c needs.  Pt presents w/ decreased functional mob, standing balance, endurance, B LE strength and coordination, barriers at home.  Pt will benefit from skilled PT to correct for the above problems.  Whenr stable, anticipate pt will be able to d/c home, consider Level III (minimal) post acute care therapy needs based on progress.  also recommend OT consult as well.        Rehab Resource Intensity Level, PT: III (Minimum Resource Intensity)    See flowsheet documentation for full assessment.

## 2024-10-27 NOTE — ASSESSMENT & PLAN NOTE
Etiology: suspect secondary to hypertension, obstructive uropathy in the setting of bilateral ureteral strictures, underlying glomerular pathology as well as age-related nephron loss  Her labs show a stable creatinine of 2.05 with a GFR of 24 mL/min and a BUN of 28, electrolytes and acid-base status are acceptable  Her blood pressures are currently adequate around 120/70  She is taking in more, she still has a reasonable amount of urine output with 3.7 L total  Given that her urine output is upwards of 3 L and she has not tolerating much p.o. intake we will maintain on D5 LR at 75 cc an hour for now if any clinical signs of volume overload would discontinue if diet is advanced would continue, if urine output decreases to under 3 L would consider de-escalation of fluid

## 2024-10-27 NOTE — ASSESSMENT & PLAN NOTE
Lab Results   Component Value Date    EGFR 24 10/27/2024    EGFR 22 10/26/2024    EGFR 21 10/25/2024    CREATININE 2.05 (H) 10/27/2024    CREATININE 2.17 (H) 10/26/2024    CREATININE 2.27 (H) 10/25/2024

## 2024-10-27 NOTE — ASSESSMENT & PLAN NOTE
Postoperative day 3 diversionary ileal conduit creation without cystectomy and bilateral ureterolysis.  Patient in good spirits.  On PCEA per APS-discontinued  Abdominal dressing removed.  Midline staples--well-approximated without evidence of dehiscence.  SUSAN drain--150 cc output during day shift thus far   Urostomy--positive mucus (to be anticipated), pink viable stoma with stents x 2.  Brisk urine output with 1.6 L today.      Plan:  Continue routine postoperative care.  DVT prophylaxis and incentive spirometry at the bedside.  Splint cough.  Working with PT/OT--continue  As needed medications adjusted per APS recommendations  Trend labs  Maintain all drains--strict output  Monitor for return of bowel function.  Would not advance diet just yet.     Cell Phone/PDA (specify)/Clothing

## 2024-10-27 NOTE — PLAN OF CARE
Problem: PAIN - ADULT  Goal: Verbalizes/displays adequate comfort level or baseline comfort level  Description: Interventions:  - Encourage patient to monitor pain and request assistance  - Assess pain using appropriate pain scale  - Administer analgesics based on type and severity of pain and evaluate response  - Implement non-pharmacological measures as appropriate and evaluate response  - Consider cultural and social influences on pain and pain management  - Notify physician/advanced practitioner if interventions unsuccessful or patient reports new pain  Outcome: Progressing     Problem: INFECTION - ADULT  Goal: Absence or prevention of progression during hospitalization  Description: INTERVENTIONS:  - Assess and monitor for signs and symptoms of infection  - Monitor lab/diagnostic results  - Monitor all insertion sites, i.e. indwelling lines, tubes, and drains  - Monitor endotracheal if appropriate and nasal secretions for changes in amount and color  - Ney appropriate cooling/warming therapies per order  - Administer medications as ordered  - Instruct and encourage patient and family to use good hand hygiene technique  - Identify and instruct in appropriate isolation precautions for identified infection/condition  Outcome: Progressing  Goal: Absence of fever/infection during neutropenic period  Description: INTERVENTIONS:  - Monitor WBC    Outcome: Progressing     Problem: SAFETY ADULT  Goal: Patient will remain free of falls  Description: INTERVENTIONS:  - Educate patient/family on patient safety including physical limitations  - Instruct patient to call for assistance with activity   - Consult OT/PT to assist with strengthening/mobility   - Keep Call bell within reach  - Keep bed low and locked with side rails adjusted as appropriate  - Keep care items and personal belongings within reach  - Initiate and maintain comfort rounds  - Make Fall Risk Sign visible to staff  - Offer Toileting every 2 Hours,  in advance of need  - Initiate/Maintain bed alarm  - Apply yellow socks and bracelet for high fall risk patients  - Consider moving patient to room near nurses station  Outcome: Progressing  Goal: Maintain or return to baseline ADL function  Description: INTERVENTIONS:  -  Assess patient's ability to carry out ADLs; assess patient's baseline for ADL function and identify physical deficits which impact ability to perform ADLs (bathing, care of mouth/teeth, toileting, grooming, dressing, etc.)  - Assess/evaluate cause of self-care deficits   - Assess range of motion  - Assess patient's mobility; develop plan if impaired  - Assess patient's need for assistive devices and provide as appropriate  - Encourage maximum independence but intervene and supervise when necessary  - Involve family in performance of ADLs  - Assess for home care needs following discharge   - Consider OT consult to assist with ADL evaluation and planning for discharge  - Provide patient education as appropriate  Outcome: Progressing  Goal: Maintains/Returns to pre admission functional level  Description: INTERVENTIONS:  - Perform AM-PAC 6 Click Basic Mobility/ Daily Activity assessment daily.  - Set and communicate daily mobility goal to care team and patient/family/caregiver.   - Collaborate with rehabilitation services on mobility goals if consulted  - Perform Range of Motion 3 times a day.  - Reposition patient every 2 hours.  - Dangle patient 3 times a day  - Stand patient 3 times a day  - Ambulate patient 3 times a day  - Out of bed to chair 3 times a day   - Out of bed for meals 3 times a day  - Out of bed for toileting  - Record patient progress and toleration of activity level   Outcome: Progressing     Problem: DISCHARGE PLANNING  Goal: Discharge to home or other facility with appropriate resources  Description: INTERVENTIONS:  - Identify barriers to discharge w/patient and caregiver  - Arrange for needed discharge resources and  transportation as appropriate  - Identify discharge learning needs (meds, wound care, etc.)  - Arrange for interpretive services to assist at discharge as needed  - Refer to Case Management Department for coordinating discharge planning if the patient needs post-hospital services based on physician/advanced practitioner order or complex needs related to functional status, cognitive ability, or social support system  Outcome: Progressing     Problem: Knowledge Deficit  Goal: Patient/family/caregiver demonstrates understanding of disease process, treatment plan, medications, and discharge instructions  Description: Complete learning assessment and assess knowledge base.  Interventions:  - Provide teaching at level of understanding  - Provide teaching via preferred learning methods  Outcome: Progressing

## 2024-10-27 NOTE — ASSESSMENT & PLAN NOTE
Bilateral ureteral strictures 2/2 radiation for recurrent endometrial cancer with bilateral PCNs in place   S/p ileal conduit, bilateral ureterolysis on 10/24/24    Thoracic epidural placed on 10/24    Patient tolerating soft diet, discussed that we will remove epidural this morning and advance to a PO pain regimen    Plan:  Discontinue PCEA  Epidural removed, tip intact, site is clean and not tender to palpation  Start PO dilaudid 2-4mg q4h PRN for moderate-severe pain  Tylenol 650 mg q6 scheduled  IV dilaudid 0.5mg q1h PRN for breakthrough pain  Start robaxin 750mg q6h scheduled

## 2024-10-27 NOTE — PROGRESS NOTES
Progress Note - Nephrology   Name: Ragini Melendez 69 y.o. female I MRN: [id number]  Unit/Bed#: -01 I Date of Admission: 10/24/2024   Date of Service: 10/27/2024 I Hospital Day: 3    Assessment & Plan  CKD (chronic kidney disease) stage 4, GFR 15-29 ml/min ([facility])  Etiology: suspect secondary to hypertension, obstructive uropathy in the setting of bilateral ureteral strictures, underlying glomerular pathology as well as age-related nephron loss  Her labs show a stable creatinine of 2.05 with a GFR of 24 mL/min and a BUN of 28, electrolytes and acid-base status are acceptable  Her blood pressures are currently adequate around 120/70  She is taking in more, she still has a reasonable amount of urine output with 3.7 L total  Given that her urine output is upwards of 3 L and she has not tolerating much p.o. intake we will maintain on D5 LR at 75 cc an hour for now if any clinical signs of volume overload would discontinue if diet is advanced would continue, if urine output decreases to under 3 L would consider de-escalation of fluid  Benign essential hypertension  Blood pressures currently at goal    Anemia due to stage 4 chronic kidney disease  (HCC)  Current hemoglobin 11.7  Previously received IV iron supplementation with oncology  Avoid KENNEY given malignancy  Defer management of anemia to hematology  Bilateral hydronephrosis  Status post ileal conduit urine diversion (no cystectomy) lateral ureterolysis 10/24/2024  Urology following  Endometrial cancer (HCC)  Has been greater than 2 years off treatment per gyn oncology feelsthis is prolonged remission  GYn oncology following     Please contact the SecureChat role for the Nephrology service with any questions/concerns.    Subjective   Brief History of Admission -chronic kidney disease stage IV with a history of endometrial cancer, status post ileal conduit after being found to have bilateral hydronephrosis    The patient was seen today, clinically she is  complaining of some pain but overall has no new shortness of breath, she is tolerating more more food daily.    Objective :  Temp:  [98.1 °F (36.7 °C)-98.8 °F (37.1 °C)] 98.8 °F (37.1 °C)  HR:  [75-85] 75  BP: (120-128)/(66-75) 128/69  Resp:  [16-18] 16  SpO2:  [94 %-98 %] 95 %  O2 Device: None (Room air)    Current Weight: Weight - Scale: 71.7 kg (158 lb)  First Weight: Weight - Scale: 71.7 kg (158 lb)  I/O         10/25 0701  10/26 0700 10/26 0701  10/27 0700 10/27 0701  10/28 0700    P.O. 480 240     I.V. (mL/kg) 3842.6 (53.6) 559.4 (7.8)     IV Piggyback       Total Intake(mL/kg) 4322.6 (60.3) 799.4 (11.1)     Urine (mL/kg/hr) 800 (0.5) 3700 (2.2) 800 (3.2)    Drains 40 100 40    Total Output 840 3800 840    Net +3482.6 -3000.6 -840                 Physical Exam  Vitals and nursing note reviewed.   Constitutional:       General: She is not in acute distress.     Appearance: She is well-developed.   HENT:      Head: Normocephalic and atraumatic.   Eyes:      Conjunctiva/sclera: Conjunctivae normal.   Cardiovascular:      Rate and Rhythm: Normal rate and regular rhythm.      Heart sounds: No murmur heard.  Pulmonary:      Effort: Pulmonary effort is normal. No respiratory distress.      Breath sounds: Normal breath sounds.   Abdominal:      Palpations: Abdomen is soft.      Tenderness: There is no abdominal tenderness.   Musculoskeletal:         General: No swelling.      Cervical back: Neck supple.   Skin:     General: Skin is warm and dry.      Capillary Refill: Capillary refill takes less than 2 seconds.   Neurological:      Mental Status: She is alert.   Psychiatric:         Mood and Affect: Mood normal.         Medications:    Current Facility-Administered Medications:     acetaminophen (TYLENOL) tablet 650 mg, 650 mg, Oral, Q6H Demetrius ALVARADO MD, 650 mg at 10/27/24 0508    ARIPiprazole (ABILIFY) tablet 20 mg, 20 mg, Oral, Demetrius BENOIT MD, 20 mg at 10/27/24 0850    calcitriol  (ROCALTROL) capsule 0.25 mcg, 0.25 mcg, Oral, Once per day on Monday Wednesday Friday, Demetrius oLwe MD, 0.25 mcg at 10/25/24 0853    calcium acetate (PHOSLO) capsule 667 mg, 667 mg, Oral, TID With Meals, Demetrius Lowe MD, 667 mg at 10/27/24 0828    calcium carbonate-vitamin D 500 mg-5 mcg tablet 1 tablet, 1 tablet, Oral, Daily With Breakfast, Demetrius Lowe MD, 1 tablet at 10/27/24 0827    Cholecalciferol (VITAMIN D3) tablet 4,000 Units, 4,000 Units, Oral, Daily, Demetrius Lowe MD, 4,000 Units at 10/27/24 0827    cyanocobalamin (VITAMIN B-12) tablet 50 mcg, 50 mcg, Oral, Daily, Demetrius Lowe MD, 50 mcg at 10/27/24 0828    dextrose 5 % in lactated Ringer's infusion, 75 mL/hr, Intravenous, Continuous, Wilton Multani DO, Last Rate: 75 mL/hr at 10/26/24 2117, 75 mL/hr at 10/26/24 2117    docusate sodium (COLACE) capsule 100 mg, 100 mg, Oral, BID, Demetrius Lowe MD, 100 mg at 10/27/24 0828    fluticasone (FLONASE) 50 mcg/act nasal spray 1 spray, 1 spray, Nasal, Daily, Demetrius Lowe MD, 1 spray at 10/27/24 0827    folic acid (FOLVITE) tablet 1 mg, 1 mg, Oral, HS, Demetrius Lowe MD, 1 mg at 10/26/24 2104    heparin (porcine) subcutaneous injection 5,000 Units, 5,000 Units, Subcutaneous, Q12H CHRISTIANO, 5,000 Units at 10/27/24 0948 **AND** [COMPLETED] Platelet count, , , Once, Demetrius Lowe MD    HYDROmorphone (DILAUDID) injection 0.5 mg, 0.5 mg, Intravenous, Q1H PRN, RAFITA Simpson    HYDROmorphone (DILAUDID) tablet 2 mg, 2 mg, Oral, Q4H PRN, RAFITA Simpson    loratadine (CLARITIN) tablet 10 mg, 10 mg, Oral, Daily, Demetrius Lowe MD, 10 mg at 10/27/24 0827    methocarbamol (ROBAXIN) tablet 750 mg, 750 mg, Oral, Q6H CHRISTIANO, RAFITA Simpson    multivitamin stress formula tablet 1 tablet, 1 tablet, Oral, QAM, Demetrius Lowe MD, 1 tablet at 10/27/24 0828    naloxone (NARCAN) injection 0.1 mg, 0.1 mg, Intravenous, Q1MIN PRN,  "Vaishali Amanda MD    ondansetron (ZOFRAN) injection 4 mg, 4 mg, Intravenous, Q6H PRN, Demetrius Lowe MD, 4 mg at 10/26/24 1736    pantoprazole (PROTONIX) EC tablet 20 mg, 20 mg, Oral, Early Morning, Demetrius Lowe MD, 20 mg at 10/27/24 0508    venlafaxine (EFFEXOR-XR) 24 hr capsule 37.5 mg, 37.5 mg, Oral, Daily, Demetrius Lowe MD, 37.5 mg at 10/27/24 0827      Lab Results: I have reviewed the following results:  Results from last 7 days   Lab Units 10/27/24  0521 10/26/24  0501 10/25/24  0432 10/24/24  1652 10/24/24  1328   WBC Thousand/uL 9.35 10.33* 12.10*  --  23.28*   HEMOGLOBIN g/dL 7.9* 9.1* 10.1*  --  11.7   HEMATOCRIT % 25.4* 28.8* 31.7*  --  37.4   PLATELETS Thousands/uL 180 185 255 278 286   POTASSIUM mmol/L 4.4 4.4 4.7  --  4.5   CHLORIDE mmol/L 108 107 104  --  103   CO2 mmol/L 24 23 20*  --  20*   BUN mg/dL 28* 31* 40*  --  42*   CREATININE mg/dL 2.05* 2.17* 2.27*  --  2.46*   CALCIUM mg/dL 8.6 8.7 7.8*  --  8.2*   MAGNESIUM mg/dL  --  1.6*  --   --   --        Administrative Statements     Portions of the record may have been created with voice recognition software. Occasional wrong word or \"sound a like\" substitutions may have occurred due to the inherent limitations of voice recognition software. Read the chart carefully and recognize, using context, where substitutions have occurred.If you have any questions, please contact the dictating provider.  "

## 2024-10-27 NOTE — PHYSICAL THERAPY NOTE
Physical Therapy Evaluation    Patient's Name: Ragini Melendez    Admitting Diagnosis  Ureteral stricture [N13.5]    Problem List  Patient Active Problem List   Diagnosis    Benign essential hypertension    Major depression, chronic    Dyslipidemia    Endometrial cancer (HCC)    Encounter for follow-up surveillance of endometrial cancer    Encounter for central line care    Chemotherapy induced neutropenia (HCC)    Anemia    Acute on chronic kidney disease    Bilateral lower extremity edema    Severe protein-calorie malnutrition (HCC)    Hypomagnesemia    Obstruction of right ureter    Overactive bladder    Acute left-sided low back pain without sciatica    Nephrostomy status (HCC)    Bilateral piriformis syndrome    Chronic pain syndrome    Myofascial pain syndrome    Pathological fracture due to osteoporosis    Osteoporosis    Dental disorder    Need for follow-up by     Closed nondisplaced fracture of right pubis with delayed healing    Secondary malignant neoplasm of other specified sites (HCC)    Chronic UTI    Depression, recurrent (HCC)    Vaginal atrophy    Nephrotoxicity    Dry mouth    Abnormal iron saturation    Hemorrhagic cystitis w/ pyelonephritis    History of DVT (deep vein thrombosis)    Other proteinuria    CKD (chronic kidney disease) stage 4, GFR 15-29 ml/min ([facility])    Preop cardiovascular exam    Left-sided chest wall pain    Ureteral stricture    Overweight with body mass index (BMI) of 28 to 28.9 in adult    Thrombocytopenia, unspecified (HCC)    Hypertensive CKD (chronic kidney disease)    Bilateral hydronephrosis    Anemia due to stage 4 chronic kidney disease  (HCC)       Past Medical History  Past Medical History:   Diagnosis Date    Anemia     Anxiety     Cancer (HCC)     ENDOMETRIAL    Chemotherapy induced neutropenia (HCC) 08/30/2019    Chronic kidney disease     CKD (chronic kidney disease) stage 3, GFR 30-59 ml/min ([facility])     COVID     Fall 2022    Depression     DVT  (deep venous thrombosis) (HCC) 07/19/2019    RIGHT LEG    Endometrial cancer (HCC) 12/2017    GERD (gastroesophageal reflux disease)     H/O gastric bypass     Hard to intubate     pt denies AS OF 7/25/23    History of chemotherapy     started 12/2021endometrial cancer- done 12/23/22    History of DVT in adulthood     RLE    History of transfusion 04/13/2023    Hyperglycemia     vx type 2 dm -- last assessed 4/1/14; resolved 11/7/17    Hyperlipidemia     Hypertension     in past- no meds at present    Iron deficiency anemia     iron infusions weekly    Iron deficiency anemia secondary to inadequate dietary iron intake 02/08/2023    OAB (overactive bladder)     Port-A-Cath in place     Wears glasses        Past Surgical History  Past Surgical History:   Procedure Laterality Date    ABDOMINAL SURGERY      GASTRIC BYPASS; 2001    BREAST BIOPSY Right 06/28/2019    core biopsy; benign    CHOLECYSTECTOMY      at the time of gastric bypass    COLONOSCOPY      CT NEEDLE BIOPSY LYMPH NODE  07/08/2019    CYSTECTOMY, RADICAL WITH ILEOCONDUIT N/A 10/24/2024    Procedure: ILEAL CONDUIT urinary diversion (No cystectomy), BILATERAL UTERO-LYSIS;  Surgeon: Demetrius Lowe MD;  [facility];  Service: Urology    FL GUIDED CENTRAL VENOUS ACCESS DEVICE INSERTION  11/12/2019    FL RETROGRADE PYELOGRAM  03/30/2023    FL RETROGRADE PYELOGRAM  05/09/2023    FL RETROGRADE PYELOGRAM  8/1/2023    GASTRIC BYPASS      HYSTERECTOMY Bilateral 2017    total abdominal with salpingo-oophorectomy    IR NEPHROSTOMY TO NEPHROURETERAL STENT  06/09/2022    IR NEPHROSTOMY TO NEPHROURETERAL STENT  12/20/2022    IR NEPHROSTOMY TO NEPHROURETERAL STENT  8/8/2023    IR NEPHROSTOMY TUBE CHECK/CHANGE/REPOSITION/REINSERTION/UPSIZE  05/27/2022    IR NEPHROSTOMY TUBE CHECK/CHANGE/REPOSITION/REINSERTION/UPSIZE  11/10/2023    IR NEPHROSTOMY TUBE CHECK/CHANGE/REPOSITION/REINSERTION/UPSIZE  2/9/2024    IR NEPHROSTOMY TUBE  CHECK/CHANGE/REPOSITION/REINSERTION/UPSIZE  4/12/2024    IR NEPHROSTOMY TUBE CHECK/CHANGE/REPOSITION/REINSERTION/UPSIZE  7/12/2024    IR NEPHROSTOMY TUBE CHECK/CHANGE/REPOSITION/REINSERTION/UPSIZE  10/16/2024    IR NEPHROSTOMY TUBE PLACEMENT  07/26/2019    IR NEPHROSTOMY TUBE PLACEMENT  05/27/2023    IR NEPHROURETERAL STENT CHECK/CHANGE/REPOSITION  04/06/2021    IR NEPHROURETERAL STENT CHECK/CHANGE/REPOSITION  06/04/2021    IR NEPHROURETERAL STENT CHECK/CHANGE/REPOSITION  09/03/2021    IR NEPHROURETERAL STENT CHECK/CHANGE/REPOSITION  12/07/2021    IR NEPHROURETERAL STENT CHECK/CHANGE/REPOSITION  03/08/2022    IR NEPHROURETERAL STENT CHECK/CHANGE/REPOSITION  05/10/2022    IR NEPHROURETERAL STENT CHECK/CHANGE/REPOSITION  09/19/2022    IR PICC LINE  09/27/2019    IR PORT PLACEMENT  07/26/2019    IR PORT PLACEMENT      IR PORT REMOVAL  09/20/2019    OOPHORECTOMY Bilateral 2017    ME CYSTO BLADDER W/URETERAL CATHETERIZATION Right 03/30/2023    Procedure: CYSTOSCOPY RETROGRADE PYELOGRAM WITH exchange of STENT URETERAL;  Surgeon: Demetrius Lowe MD;  [facility];  Service: Urology    ME CYSTO BLADDER W/URETERAL CATHETERIZATION Bilateral 05/09/2023    Procedure: CYSTOSCOPY, BILATERAL RETROGRADE PYELOGRAM, WITH LEFT INSERTION STENT URETERAL, RIGHT URTERAL STENT EXCHANGE;  Surgeon: Nicola Hannah MD;  [facility];  Service: Urology    ME CYSTO BLADDER W/URETERAL CATHETERIZATION Bilateral 8/1/2023    Procedure: CYSTOSCOPY BILATERAL RETROGRADE  WITH REMOVAL BILATERAL  STENT URETERAL;  Surgeon: Demetrius Lowe MD;  [facility];  Service: Urology    ME CYSTO W/INSERT URETERAL STENT Right 03/30/2023    Procedure: EXCHANGE STENT URETERAL;  Surgeon: Demetrius Lowe MD;  [facility];  Service: Urology    ME INSJ TUNNELED CTR VAD W/SUBQ PORT AGE 5 YR/> Left 11/12/2019    Procedure: INSERTION VENOUS PORT ( PORT-A-CATH) IR;  Surgeon: Edilberto Guzman DO;  [facility];  Service: Interventional Radiology    OH LAPS ABD PRTM&OMENTUM DX W/WO SPEC BR/WA SPX N/A 12/19/2017    Procedure: LAPAROSCOPY DIAGNOSTIC;  Surgeon: Evgeny So MD;  [facility];  Service: Gynecology Oncology    OH LAPS W/RAD HYST W/BILAT LMPHADEC RMVL TUBE/OVARY N/A 12/19/2017    Procedure: HYSTERECTOMY LAPAROSCOPIC TOTAL (LTH) W/ ROBOTICS; BILATERAL SALPINGOOPHERECTOMY; LYMPH NODE DISSECTION; lysis of adhesions;  Surgeon: Evgeny So MD;  [facility];  Service: Gynecology Oncology    REMOVAL URETERAL STENT Bilateral 8/1/2023    Procedure: REMOVAL STENT URETERAL;  Surgeon: Demetrius Lowe MD;  [facility];  Service: Urology    TONSILLECTOMY      US GUIDED BREAST BIOPSY RIGHT COMPLETE Right 06/28/2019        10/27/24 1045   PT Last Visit   PT Visit Date 10/27/24   Note Type   Note type Evaluation   Pain Assessment   Pain Assessment Tool 0-10   Pain Score 5   Pain Location/Orientation Location: Incision;Location: Abdomen;Location: Generalized   Patient's Stated Pain Goal No pain   Hospital Pain Intervention(s) Ambulation/increased activity   Restrictions/Precautions   Weight Bearing Precautions Per Order No   Other Precautions Multiple lines;Telemetry;Chair Alarm;Bed Alarm;Fall Risk;Pain  (chair alarm armed post session)   Home Living   Type of Home House   Additional Comments Resides w/ mother who has dementia in 2 story home w/ stair glide, 3 RAJANI.  Indep self care, has cane and RW but typically does not use.  has sister, brother that can also assist in mother's care but she typically is mother's primary caregiver.  Mother does not need a great deal of physical assist   Prior Function   Level of [place] Independent with ADLs;Independent with functional mobility   Falls in the last 6 months 0   General   Family/Caregiver Present No   Cognition   Overall Cognitive Status WFL   Arousal/Participation Responsive   Orientation Level Oriented X4   Memory Unable to  assess   Following Commands Follows one step commands without difficulty   Subjective   Subjective states she feels OK.  some pain, and has nausea w/ mobility   RLE Assessment   RLE Assessment   (strength grossly 4-/5- assessed w/ mobility)   LLE Assessment   LLE Assessment   (strength grossly 4-/5, assessed w/ mobility)   Coordination   Movements are Fluid and Coordinated 0   Coordination and Movement Description mild B LE ataxia   Bed Mobility   Supine to Sit 5  Supervision   Additional items Assist x 1;Increased time required;Verbal cues   Transfers   Sit to Stand 4  Minimal assistance   Additional items Assist x 1;Increased time required   Stand to Sit 5  Supervision   Additional items Assist x 2;Increased time required;Verbal cues   Ambulation/Elevation   Gait pattern   (slow, antalgic, mild ataxia)   Gait Assistance 4  Minimal assist   Additional items Assist x 1   Assistive Device Rolling walker   Distance 80' w/ standing rest   Balance   Static Sitting Good   Dynamic Sitting Fair   Static Standing Fair -   Dynamic Standing Fair -   Ambulatory Fair -   Endurance Deficit   Endurance Deficit Yes   Endurance Deficit Description fatigue, weakness, pain   Activity Tolerance   Activity Tolerance Patient tolerated treatment well;Patient limited by fatigue;Patient limited by pain   Nurse Made Aware yes   Assessment   Prognosis Good   Problem List Decreased strength;Decreased endurance;Impaired balance;Decreased mobility;Pain   Assessment Pt seen for physical therapy evaluation.  Pt is a 68 y/o female w/ history/comorbidities of uterine CA, CKD, depression, gastric bypass, HTN, HLD who is now admitted w/ B ureteral strictures for elective ileal conduit,, B uretrolysis 10/24/24.  Due to acute medical issues, acute procedure, pain, fall risk, note unstable clinical picture.  PT consulted to assess post op mobility, d/c needs.  Pt presents w/ decreased functional mob, standing balance, endurance, B LE strength and  coordination, barriers at home.  Pt will benefit from skilled PT to correct for the above problems.  Whenr stable, anticipate pt will be able to d/c home, consider Level III (minimal) post acute care therapy needs based on progress.  also recommend OT consult as well.   Goals   Patient Goals to get stronger, then go home   STG Expiration Date 11/10/24   Short Term Goal #1 1-2 wks: bed mob and transfers w. indep, standing balance to good/nornmal w/ device, ambulate 200 ft w/ RW vs least restrictive vs no device and mod I, increase B LE strength by 1/2 -1 grade, ambulate 3-5 stairs w/ S   PT Treatment Day 0   Plan   Treatment/Interventions Functional transfer training;Elevations;LE strengthening/ROM;Therapeutic exercise;Endurance training;Equipment eval/education;Bed mobility;Gait training;Patient/family training   PT Frequency 3-5x/wk   Discharge Recommendation   Rehab Resource Intensity Level, PT III (Minimum Resource Intensity)   AM-PAC Basic Mobility Inpatient   Turning in Flat Bed Without Bedrails 4   Lying on Back to Sitting on Edge of Flat Bed Without Bedrails 4   Moving Bed to Chair 3   Standing Up From Chair Using Arms 3   Walk in Room 3   Climb 3-5 Stairs With Railing 2   Basic Mobility Inpatient Raw Score 19   Basic Mobility Standardized Score 42.48   [facility] Highest Level Of Mobility   -HLM Goal 6: Walk 10 steps or more   -HLM Achieved 7: Walk 25 feet or more           Abdiel Saenz PT, DPT, CSRS

## 2024-10-27 NOTE — PROGRESS NOTES
Progress Note - Urology   Name: Ragini Melendez 69 y.o. female I MRN: [id number]  Unit/Bed#: -01 I Date of Admission: 10/24/2024   Date of Service: 10/27/2024 I Hospital Day: 3    Assessment & Plan  Bilateral hydronephrosis  Postoperative day 3 diversionary ileal conduit creation without cystectomy and bilateral ureterolysis.  Patient in good spirits.  On PCEA per APS-discontinued  Abdominal dressing removed.  Midline staples--well-approximated without evidence of dehiscence.  SUSAN drain--150 cc output during day shift thus far   Urostomy--positive mucus (to be anticipated), pink viable stoma with stents x 2.  Brisk urine output with 1.6 L today.      Plan:  Continue routine postoperative care.  DVT prophylaxis and incentive spirometry at the bedside.  Splint cough.  Working with PT/OT--continue  As needed medications adjusted per APS recommendations  Trend labs  Maintain all drains--strict output  Monitor for return of bowel function.  Would not advance diet just yet.    Endometrial cancer (HCC)  Followed by GYN/oncology--appreciate collaboration.    History of radiation as well as systemic chemotherapy.    CKD (chronic kidney disease) stage 4, GFR 15-29 ml/min ([facility])  Lab Results   Component Value Date    EGFR 24 10/27/2024    EGFR 22 10/26/2024    EGFR 21 10/25/2024    CREATININE 2.05 (H) 10/27/2024    CREATININE 2.17 (H) 10/26/2024    CREATININE 2.27 (H) 10/25/2024   Followed by nephrology--appreciated   Patient with history of renal atrophy and essentially solitary kidney status  Interval improvement in renal function.    Urology service will follow.    Subjective   Ragini Melendez is a pleasant 69-year-old female with history of endometrial carcinoma status post ASH/BSO with findings consistent with metastatic disease.  She underwent wide-field pelvic radiation and unfortunately developed subsequent bilateral distal ureteral strictures and resultant chronic bilateral hydronephrosis.  She was initially  managed with chronic indwelling ureteral stents, but later redeveloped hydronephrosis and sepsis despite retained stents.  She later transitioned to bilateral nephrostomy tubes for maximal drainage.  CT demonstrated right renal unit atrophy.  After vaccination of risk versus benefits and potential complications, she was agreeable to proceed with exploratory laparotomy ureterolysis and ileal conduit creation for urinary diversion without cystectomy.  She is now postoperative day 3.   She is seen today in follow-up.     Patient has advanced to mechanically soft diet.  Ambulating with PT.  PCA discontinued.    Patient is in room with her sister.  Afebrile, hemodynamically stable adequately pain controlled.    Objective :  Temp:  [98.1 °F (36.7 °C)-98.8 °F (37.1 °C)] 98.8 °F (37.1 °C)  HR:  [75-85] 75  BP: (120-128)/(66-75) 128/69  Resp:  [16-18] 16  SpO2:  [94 %-98 %] 95 %  O2 Device: None (Room air)    I/O         10/25 0701  10/26 0700 10/26 0701  10/27 0700 10/27 0701  10/28 0700    P.O. 480 240     I.V. (mL/kg) 3842.6 (53.6) 559.4 (7.8) 1888.8 (26.3)    IV Piggyback       Total Intake(mL/kg) 4322.6 (60.3) 799.4 (11.1) 1888.8 (26.3)    Urine (mL/kg/hr) 800 (0.5) 3700 (2.2) 800 (2.1)    Drains 40 100 105    Total Output 840 3800 905    Net +3482.6 -3000.6 +983.8                 Lines/Drains/Airways       Active Status       Name Placement date Placement time Site Days    Port A Cath 11/12/19 Left Chest 11/12/19  1221  Chest  1810    Epidural Catheter 10/24/24 10/24/24  0750  -- 3    Closed/Suction Drain LLQ 10/24/24  1230  LLQ  2    Nephrostomy Left 10 Fr. 10/16/24  1114  Left  11    Nephrostomy Right 10.2 Fr. 10/16/24  1122  Right  11    Urostomy Ileal conduit RLQ 10/24/24  1158  RLQ  3                  Physical Exam  General appearance: alert and oriented, in no acute distress, appears stated age, cooperative, and no distress  Head: Normocephalic, without obvious abnormality, atraumatic  Neck: no JVD and supple,  symmetrical, trachea midline  Lungs: diminished breath sounds  Heart: regular rate and rhythm, S1, S2 normal, no murmur, click, rub or gallop  Abdomen: abnormal findings:  mild and incision with staples--well-approximated.  No signs of dehiscence. tenderness in the lower abdomen  Extremities: extremities normal, warm and well-perfused; no cyanosis, clubbing, or edema  Pulses: 2+ and symmetric  Neurologic: Grossly normal  Left PCN--capped.  Urostomy--pink viable stoma--stents x 2--yellow urine.  SUSAN--serosanguineous.      Lab Results: I have reviewed the following results:  Recent Labs     10/26/24  0501 10/27/24  0521   WBC 10.33* 9.35   HGB 9.1* 7.9*   HCT 28.8* 25.4*    180   SODIUM 138 138   K 4.4 4.4    108   CO2 23 24   BUN 31* 28*   CREATININE 2.17* 2.05*   GLUC 113 103   MG 1.6*  --            VTE Pharmacologic Prophylaxis: Heparin  VTE Mechanical Prophylaxis: sequential compression device

## 2024-10-27 NOTE — ASSESSMENT & PLAN NOTE
Lab Results   Component Value Date    EGFR 24 10/27/2024    EGFR 22 10/26/2024    EGFR 21 10/25/2024    CREATININE 2.05 (H) 10/27/2024    CREATININE 2.17 (H) 10/26/2024    CREATININE 2.27 (H) 10/25/2024   Followed by nephrology--appreciated   Patient with history of renal atrophy and essentially solitary kidney status  Interval improvement in renal function.

## 2024-10-28 LAB
ANION GAP SERPL CALCULATED.3IONS-SCNC: 9 MMOL/L (ref 4–13)
BUN SERPL-MCNC: 27 MG/DL (ref 5–25)
CALCIUM SERPL-MCNC: 9.1 MG/DL (ref 8.4–10.2)
CHLORIDE SERPL-SCNC: 107 MMOL/L (ref 96–108)
CO2 SERPL-SCNC: 25 MMOL/L (ref 21–32)
CREAT FLD-MCNC: 2.2 MG/DL
CREAT SERPL-MCNC: 2.14 MG/DL (ref 0.6–1.3)
ERYTHROCYTE [DISTWIDTH] IN BLOOD BY AUTOMATED COUNT: 13.1 % (ref 11.6–15.1)
GFR SERPL CREATININE-BSD FRML MDRD: 23 ML/MIN/1.73SQ M
GLUCOSE SERPL-MCNC: 111 MG/DL (ref 65–140)
HCT VFR BLD AUTO: 27.6 % (ref 34.8–46.1)
HGB BLD-MCNC: 8.6 G/DL (ref 11.5–15.4)
MCH RBC QN AUTO: 29.4 PG (ref 26.8–34.3)
MCHC RBC AUTO-ENTMCNC: 31.2 G/DL (ref 31.4–37.4)
MCV RBC AUTO: 94 FL (ref 82–98)
PLATELET # BLD AUTO: 211 THOUSANDS/UL (ref 149–390)
PMV BLD AUTO: 9.9 FL (ref 8.9–12.7)
POTASSIUM SERPL-SCNC: 4.5 MMOL/L (ref 3.5–5.3)
RBC # BLD AUTO: 2.93 MILLION/UL (ref 3.81–5.12)
SODIUM SERPL-SCNC: 141 MMOL/L (ref 135–147)
WBC # BLD AUTO: 9.57 THOUSAND/UL (ref 4.31–10.16)

## 2024-10-28 PROCEDURE — 99232 SBSQ HOSP IP/OBS MODERATE 35: CPT | Performed by: INTERNAL MEDICINE

## 2024-10-28 PROCEDURE — 99232 SBSQ HOSP IP/OBS MODERATE 35: CPT | Performed by: STUDENT IN AN ORGANIZED HEALTH CARE EDUCATION/TRAINING PROGRAM

## 2024-10-28 PROCEDURE — 85027 COMPLETE CBC AUTOMATED: CPT | Performed by: NURSE PRACTITIONER

## 2024-10-28 PROCEDURE — 83735 ASSAY OF MAGNESIUM: CPT | Performed by: NURSE PRACTITIONER

## 2024-10-28 PROCEDURE — 80048 BASIC METABOLIC PNL TOTAL CA: CPT | Performed by: NURSE PRACTITIONER

## 2024-10-28 PROCEDURE — 82570 ASSAY OF URINE CREATININE: CPT | Performed by: NURSE PRACTITIONER

## 2024-10-28 PROCEDURE — 99024 POSTOP FOLLOW-UP VISIT: CPT | Performed by: NURSE PRACTITIONER

## 2024-10-28 PROCEDURE — 97116 GAIT TRAINING THERAPY: CPT

## 2024-10-28 PROCEDURE — 97530 THERAPEUTIC ACTIVITIES: CPT

## 2024-10-28 RX ORDER — POLYETHYLENE GLYCOL 3350 17 G/17G
17 POWDER, FOR SOLUTION ORAL DAILY
Status: DISCONTINUED | OUTPATIENT
Start: 2024-10-28 | End: 2024-10-30 | Stop reason: HOSPADM

## 2024-10-28 RX ADMIN — HYDROMORPHONE HYDROCHLORIDE 2 MG: 2 TABLET ORAL at 04:57

## 2024-10-28 RX ADMIN — DEXTROSE, SODIUM CHLORIDE, SODIUM LACTATE, POTASSIUM CHLORIDE, AND CALCIUM CHLORIDE 75 ML/HR: 5; .6; .31; .03; .02 INJECTION, SOLUTION INTRAVENOUS at 14:16

## 2024-10-28 RX ADMIN — ACETAMINOPHEN 650 MG: 325 TABLET, FILM COATED ORAL at 11:20

## 2024-10-28 RX ADMIN — FLUTICASONE PROPIONATE 1 SPRAY: 50 SPRAY, METERED NASAL at 08:00

## 2024-10-28 RX ADMIN — METHOCARBAMOL 750 MG: 750 TABLET ORAL at 17:08

## 2024-10-28 RX ADMIN — FOLIC ACID 1 MG: 1 TABLET ORAL at 21:55

## 2024-10-28 RX ADMIN — CALCIUM ACETATE 667 MG: 667 CAPSULE ORAL at 11:20

## 2024-10-28 RX ADMIN — Medication 4000 UNITS: at 07:56

## 2024-10-28 RX ADMIN — LORATADINE 10 MG: 10 TABLET ORAL at 07:57

## 2024-10-28 RX ADMIN — METHOCARBAMOL 750 MG: 750 TABLET ORAL at 04:58

## 2024-10-28 RX ADMIN — HEPARIN SODIUM 5000 UNITS: 5000 INJECTION INTRAVENOUS; SUBCUTANEOUS at 21:55

## 2024-10-28 RX ADMIN — CALCIUM ACETATE 667 MG: 667 CAPSULE ORAL at 17:08

## 2024-10-28 RX ADMIN — Medication 1 TABLET: at 07:56

## 2024-10-28 RX ADMIN — VITAM B12 50 MCG: 100 TAB at 07:57

## 2024-10-28 RX ADMIN — POLYETHYLENE GLYCOL 3350 17 G: 17 POWDER, FOR SOLUTION ORAL at 11:20

## 2024-10-28 RX ADMIN — PANTOPRAZOLE SODIUM 20 MG: 20 TABLET, DELAYED RELEASE ORAL at 04:58

## 2024-10-28 RX ADMIN — METHOCARBAMOL 750 MG: 750 TABLET ORAL at 11:20

## 2024-10-28 RX ADMIN — ARIPIPRAZOLE 20 MG: 10 TABLET ORAL at 07:56

## 2024-10-28 RX ADMIN — ACETAMINOPHEN 650 MG: 325 TABLET, FILM COATED ORAL at 04:58

## 2024-10-28 RX ADMIN — ACETAMINOPHEN 650 MG: 325 TABLET, FILM COATED ORAL at 17:07

## 2024-10-28 RX ADMIN — B-COMPLEX W/ C & FOLIC ACID TAB 1 TABLET: TAB at 08:00

## 2024-10-28 RX ADMIN — HEPARIN SODIUM 5000 UNITS: 5000 INJECTION INTRAVENOUS; SUBCUTANEOUS at 07:57

## 2024-10-28 RX ADMIN — VENLAFAXINE HYDROCHLORIDE 37.5 MG: 37.5 CAPSULE, EXTENDED RELEASE ORAL at 07:57

## 2024-10-28 RX ADMIN — CALCIUM ACETATE 667 MG: 667 CAPSULE ORAL at 07:57

## 2024-10-28 RX ADMIN — DOCUSATE SODIUM 100 MG: 100 CAPSULE, LIQUID FILLED ORAL at 17:08

## 2024-10-28 RX ADMIN — CALCITRIOL CAPSULES 0.25 MCG 0.25 MCG: 0.25 CAPSULE ORAL at 07:57

## 2024-10-28 RX ADMIN — DOCUSATE SODIUM 100 MG: 100 CAPSULE, LIQUID FILLED ORAL at 07:56

## 2024-10-28 NOTE — WOUND OSTOMY CARE
"Progress Note- Ostomy  Ragini Melendez 69 y.o. female  [id number]  --01    Assessment:  Patient seen today for ostomy teaching. Patient underwent ileal conduit on 10/24/24. Patient is awake and alert. Patient is agreeable to visit. Patient/family is primary learner.     Topics reviewed with patient: normal & abdormal stoma appearance, how and when to empty the pouch (1/3- 1/2 full) to prevent pulling/lifting of the barrier, importance & how to clean the end of the pouch to prevent odor after pouch emptying, frequency of changing of the pouch (every 3-4 days on a schedule), one piece/two piece pouching systems differences, purpose of stents, feedings stents through wafer of pouch, attaching pouch to bedside drainage bag, Clothing- including avoiding placing belt-line/seat belts over the stoma, bathing, alise-stomal skin care, how to measure the stoma/ cut the wafer to the correct size, how to close the pouch, and how to obtain supplies.     Step-by-step pouch change done using convatec one piece flat urostomy pouch. Reviewed with patient how and when to measure the stoma (weekly). Demonstrated how to trace & cut one piece barrier, and how to apply it to the skin using gentle pressure / warmth of the hands. Skin prep applied to alise-stomal skin, rationale explained to patient. Good seal obtained.    Patient verbalized understanding of education and teaching. Patient is active learner. All questions and concerns answered. Encouraged patient to continue to read the educational materials provided - \"Life after Urostomy Surgery\" by [employer].     Patient gave verbal permission to order sample kits from [employer], [employer], and [employer]. Additional discharge supplies and pouches provided to patient- left at beside.     Stoma appearance:     Stoma: Red, oval, slightly budded stoma with center os, peristomal skin is intact. 2 long stents intact. Stoma attached to skin junction, no separation noted. Moderate amount " "of yellow urine noted in bedside pouch. Stoma Measurement: 2.5 cms by 3 cms      Ostomy Care:  -Stoma Measurement: 2.5 cms by 3 cms (Measure stoma weekly for next 6-8 weeks- stoma will decrease in size due to decrease in swelling)     -Appliance Used During Bag Change: Convatec One Piece Flat Pouch  -Accessories Used: 3m no sting skin prep    *Can shower with pouch on or off. Make sure to dry off pouch after shower.     Bag Change Steps:  1. Peel back pouch using push-pull method, may use non-alcohol adhesive remover. Remove pouch from top to bottom.   2. Use warm water only to cleanse skin around the stoma (alise-stomal skin).   3. Make sure all adhesive residue is removed and skin is dry and not oily.  4. Measure stoma size using measuring guide and trace correct measurements onto back of pouch.  5. Then cut or mold the backing of pouch out to correct shape/size.  6. Apply skin prep to intact alise-stomal skin and allow to dry.   7. Feed stents into pouch and place pouch over stoma and onto skin.  8. Use warmth of hand to apply gentle pressure to help backing of pouch to adhere well to skin.  9-Apply Urostomy adaptor to spout of pouch and attach to bedside drainage bag if needed.     **If the skin is open, moist or fragile, Crusting can be done prior to pouch application (before step 6) to create dry skin and assist with pouch adherence- steps are listed below.      TIPS:  Empty pouch when 1/3 -1/2 full.  Change pouch every 4-5 days or if signs of leaking.    Crusting as needed for moist, red, open skin around the stoma: (Done prior to pouch application)   Apply stoma powder to excoriation, move excess powder away with hand  Use no sting barrier to pat area to form \"crust\"  Repeat X2 before placing new ostomy pouch      Overnight bag--attach overnight bag to urostomy pouch at bedtime.  Remove your overnight bag in the morning and empty urine into the toilet.  Rinse bag with warm water and drain.  Fill bag about 1/2 " way with 1 part white vinegar and 3 parts water solution.  Soak for 20 minutes.  Drain bag.  Rinse bag with warm water, drain, and hang up to dry.        Orders listed above and wound care will continue to follow, call or Secure Chat Message with questions. Bedside nurse updated of findings and orders. Flowsheets updated with exam details and measurements.      Radha Vazquez RN, BSN, CWOCN

## 2024-10-28 NOTE — ASSESSMENT & PLAN NOTE
Current hemoglobin trended down to 8.6 g/dL  Previously received IV iron supplementation with oncology  Avoid KENNEY given malignancy  Recommend anemia management per hematology oncology

## 2024-10-28 NOTE — ASSESSMENT & PLAN NOTE
Has been greater than 2 years off treatment.  per gyn oncology feels this is prolonged remission  GYn oncology following

## 2024-10-28 NOTE — ASSESSMENT & PLAN NOTE
S/p diversionary ileal conduit creation without cystectomy and bilateral ureterolysis 10/24  WBC 9.57  Hgb 8.6  Creat 2.14  SUSAN drain--340 mL / 24 hours  Ileal conduit output 4175 mL / 24 hours  Right nephrostomy aspirate: 1 mL, irrigated with 10 mL and leakage noted  Left nephrostomy aspirate: 1 mL of purulent urine, manually irrigated with 10 mL of saline  Manually irrigate left nephrostomy tube with 10 mL of sterile saline daily  Afebrile, vital signs stable  Continue PT OT  Continue to trend labs  Continue to monitor for return of bowel function  Add MiraLAX daily  Strict I&O  DVT prophylaxis and incentive spirometer

## 2024-10-28 NOTE — ASSESSMENT & PLAN NOTE
Bilateral ureteral strictures 2/2 radiation for recurrent endometrial cancer with bilateral PCNs in place   S/p ileal conduit, bilateral ureterolysis on 10/24/24    Thoracic epidural placed on 10/24, removed 10/27    Patient tolerating regular diet, discussed increasing ambulation    Plan:  Epidural removed 10/27 - doing well  Encourage Ambulation  PO dilaudid 2-4mg q4h PRN for moderate-severe pain  Tylenol 650 mg q6 scheduled  IV dilaudid 0.5mg q1h PRN for breakthrough pain  Robaxin 750mg q6h scheduled

## 2024-10-28 NOTE — PLAN OF CARE
Problem: PAIN - ADULT  Goal: Verbalizes/displays adequate comfort level or baseline comfort level  Description: Interventions:  - Encourage patient to monitor pain and request assistance  - Assess pain using appropriate pain scale  - Administer analgesics based on type and severity of pain and evaluate response  - Implement non-pharmacological measures as appropriate and evaluate response  - Consider cultural and social influences on pain and pain management  - Notify physician/advanced practitioner if interventions unsuccessful or patient reports new pain  Outcome: Progressing     Problem: INFECTION - ADULT  Goal: Absence or prevention of progression during hospitalization  Description: INTERVENTIONS:  - Assess and monitor for signs and symptoms of infection  - Monitor lab/diagnostic results  - Monitor all insertion sites, i.e. indwelling lines, tubes, and drains  - Monitor endotracheal if appropriate and nasal secretions for changes in amount and color  - [place] appropriate cooling/warming therapies per order  - Administer medications as ordered  - Instruct and encourage patient and family to use good hand hygiene technique  - Identify and instruct in appropriate isolation precautions for identified infection/condition  Outcome: Progressing     Problem: SAFETY ADULT  Goal: Patient will remain free of falls  Description: INTERVENTIONS:  - Educate patient/family on patient safety including physical limitations  - Instruct patient to call for assistance with activity   - Consult OT/PT to assist with strengthening/mobility   - Keep Call bell within reach  - Keep bed low and locked with side rails adjusted as appropriate  - Keep care items and personal belongings within reach  - Initiate and maintain comfort rounds  - Make Fall Risk Sign visible to staff  - Offer Toileting every 2 Hours, in advance of need  - Initiate/Maintain bed/chair alarm  - Obtain necessary fall risk management equipment: walker  Problem:  DISCHARGE PLANNING  Goal: Discharge to home or other facility with appropriate resources  Description: INTERVENTIONS:  - Identify barriers to discharge w/patient and caregiver  - Arrange for needed discharge resources and transportation as appropriate  - Identify discharge learning needs (meds, wound care, etc.)  - Arrange for interpretive services to assist at discharge as needed  - Refer to Case Management Department for coordinating discharge planning if the patient needs post-hospital services based on physician/advanced practitioner order or complex needs related to functional status, cognitive ability, or social support system  Outcome: Progressing     - Apply yellow socks and bracelet for high fall risk patients  - Consider moving patient to room near nurses station  Outcome: Progressing

## 2024-10-28 NOTE — ASSESSMENT & PLAN NOTE
Etiology: suspect secondary to hypertension, obstructive uropathy in the setting of bilateral ureteral strictures, underlying glomerular pathology as well as age-related nephron loss as well as due to sequelae of recurrent acute kidney injury  Baseline creatinine around 2.0 to 2.5 mg/dL  Renal function currently stable at creatinine 2.14 mg/dL  Patient still have good urine output around 4.1 L in last 24 hours and has not been tolerating much p.o. intake.  Would continue current D5 LR at 75 mL/h for now.  Continue to monitor volume status.  Clinically appears euvolemic.  If urine output decreases we will decrease the rate of IV fluid as well  Continue to avoid nephrotoxins

## 2024-10-28 NOTE — ASSESSMENT & PLAN NOTE
-Magnesium level low at 1.6 on 10/26, will recheck level tomorrow.  If still low may need to replace

## 2024-10-28 NOTE — PLAN OF CARE
Problem: PHYSICAL THERAPY ADULT  Goal: Performs mobility at highest level of function for planned discharge setting.  See evaluation for individualized goals.  Description: Treatment/Interventions: Functional transfer training, Elevations, LE strengthening/ROM, Therapeutic exercise, Endurance training, Equipment eval/education, Bed mobility, Gait training, Patient/family training          See flowsheet documentation for full assessment, interventions and recommendations.  Outcome: Progressing  Note: Prognosis: Good  Problem List: Decreased strength, Decreased endurance, Impaired balance, Decreased mobility  Assessment: Pt seen for PT treatment session with focus on bed mobility, functional transfers, functional mobility.  Pt making steady progress toward goals this session. Pt able to ambulate increased distance this session as well as trial stairs as a result of improving activity tolerance.  However, pt does continue to present with decreased strength and balance noted during functional mobility.  Pt remains at risk for falls 2/2 above gait deficits.  Pt left supine in bed with bed alarm intact and with all needs in reach.  Pt will benefit from skilled therapy in order to address current impairments and functional limitations. PT to follow pt and recommending level III.  The patient's AM-PAC Basic Mobility Inpatient Short Form Raw Score is 18. A Raw score of greater than 16 suggests the patient may benefit from discharge to home. Please also refer to the recommendation of the Physical Therapist for safe discharge planning.        Rehab Resource Intensity Level, PT: III (Minimum Resource Intensity)    See flowsheet documentation for full assessment.

## 2024-10-28 NOTE — ASSESSMENT & PLAN NOTE
Lab Results   Component Value Date    EGFR 23 10/28/2024    EGFR 24 10/27/2024    EGFR 22 10/26/2024    CREATININE 2.14 (H) 10/28/2024    CREATININE 2.05 (H) 10/27/2024    CREATININE 2.17 (H) 10/26/2024

## 2024-10-28 NOTE — ASSESSMENT & PLAN NOTE
Followed by GYN/oncology  History of radiation as well as systemic chemotherapy over 2 years ago

## 2024-10-28 NOTE — ASSESSMENT & PLAN NOTE
Lab Results   Component Value Date    EGFR 23 10/28/2024    EGFR 24 10/27/2024    EGFR 22 10/26/2024    CREATININE 2.14 (H) 10/28/2024    CREATININE 2.05 (H) 10/27/2024    CREATININE 2.17 (H) 10/26/2024   Followed by nephrology  Patient with history of renal atrophy   Continue IV fluids per nephrology recommendations (currently D5 LR at 75 cc/h)  Continue to trend labs

## 2024-10-28 NOTE — PROGRESS NOTES
Progress Note - Acute Pain   Name: Ragini Melendez 69 y.o. female I MRN: [id number]  Unit/Bed#: -01 I Date of Admission: 10/24/2024   Date of Service: 10/28/2024 I Hospital Day: 4    Assessment & Plan  Bilateral hydronephrosis  Bilateral ureteral strictures 2/2 radiation for recurrent endometrial cancer with bilateral PCNs in place   S/p ileal conduit, bilateral ureterolysis on 10/24/24    Thoracic epidural placed on 10/24, removed 10/27    Patient tolerating regular diet, discussed increasing ambulation    Plan:  Epidural removed 10/27 - doing well  Encourage Ambulation  PO dilaudid 2-4mg q4h PRN for moderate-severe pain  Tylenol 650 mg q6 scheduled  IV dilaudid 0.5mg q1h PRN for breakthrough pain  Robaxin 750mg q6h scheduled  Endometrial cancer ([facility])    CKD (chronic kidney disease) stage 4, GFR 15-29 ml/min ([facility])  Lab Results   Component Value Date    EGFR 23 10/28/2024    EGFR 24 10/27/2024    EGFR 22 10/26/2024    CREATININE 2.14 (H) 10/28/2024    CREATININE 2.05 (H) 10/27/2024    CREATININE 2.17 (H) 10/26/2024       APS will sign off at this time. Thank you for the consult. All opioids and other analgesics to be written at discretion of primary team. Please contact Acute Pain Service - via SecureChat from [phone] with additional questions or concerns. See SecureChat or its learning for additional contacts and after hours information.    Subjective   Ragini Melendez is a 69 y.o. female seen and examined today, epidural removed yesterday and doing very well.  Minimal increase in pain, controlled with PO narcotics, only requiring one dose as of now.  Improvement in disorientation with change to dilaudid.  +flatus, -BM.  Has not ambulated much, discussed increasing today.  No other questions or concerns, denies n/v/pruritus/LE weakness, epidural site c/d/i    Pain History  Current pain location(s):  Pain Score: 4  Pain Location/Orientation: Location: Abdomen  Pain Scale: Pain Assessment Tool: 0-10  24 hour  history: as above    Opioid requirement previous 24 hours: 2mg PO Dilaudid  Meds/Allergies   all current active meds have been reviewed  Allergies   Allergen Reactions    Cephalosporins Rash     Which Cephalosporin reaction was to not specified; however, has tolerated Amoxicillin, Cefazolin, and Cefepime     Objective :  Temp:  [98.3 °F (36.8 °C)-98.8 °F (37.1 °C)] 98.3 °F (36.8 °C)  HR:  [81-84] 81  BP: (122-136)/(71-79) 134/79  Resp:  [18] 18  SpO2:  [95 %-97 %] 95 %  O2 Device: None (Room air)    Physical Exam  Vitals and nursing note reviewed.   Constitutional:       General: She is not in acute distress.     Appearance: Normal appearance.   HENT:      Head: Normocephalic and atraumatic.      Mouth/Throat:      Mouth: Mucous membranes are moist.   Eyes:      Extraocular Movements: Extraocular movements intact.   Cardiovascular:      Rate and Rhythm: Normal rate.   Pulmonary:      Effort: Pulmonary effort is normal.   Chest:      Chest wall: No tenderness.   Abdominal:      General: There is no distension.      Palpations: Abdomen is soft.      Tenderness: There is abdominal tenderness (mild).   Musculoskeletal:         General: No swelling.   Skin:     General: Skin is warm and dry.   Neurological:      Mental Status: She is alert and oriented to person, place, and time.   Psychiatric:         Mood and Affect: Mood normal.         Behavior: Behavior normal.           Lab Results: I have reviewed the following results:  Estimated Creatinine Clearance: 22.6 mL/min (A) (by C-G formula based on SCr of 2.14 mg/dL (H)).  Lab Results   Component Value Date    WBC 9.57 10/28/2024    WBC 9.2 10/27/2017    HGB 8.6 (L) 10/28/2024    HGB 12.5 10/27/2017    HCT 27.6 (L) 10/28/2024    HCT 37.4 10/27/2017     10/28/2024     10/27/2017         Component Value Date/Time     10/27/2017 0709    K 4.5 10/28/2024 0505    K 4.2 11/01/2018 0744     10/28/2024 0505     11/01/2018 0744    CO2 25  10/28/2024 0505    CO2 24 11/01/2018 0744    BUN 27 (H) 10/28/2024 0505    BUN 12 11/01/2018 0744    CREATININE 2.14 (H) 10/28/2024 0505    CREATININE 0.55 10/31/2019 0430         Component Value Date/Time    CALCIUM 9.1 10/28/2024 0505    CALCIUM 9.4 10/27/2017 0709    ALKPHOS 102 10/14/2024 1137    ALKPHOS 104 10/27/2017 0709    AST 19 10/14/2024 1137    AST 12 11/01/2018 0744    ALT 20 10/14/2024 1137    ALT 14 11/01/2018 0744    BILITOT 0.3 10/27/2017 0709    TP 7.7 10/14/2024 1137    TP 7.1 10/14/2024 1137    TP 7.0 11/01/2018 0744    ALB 3.8 10/14/2024 1137    ALB 3.8 10/14/2024 1137    ALB 4.3 10/27/2017 0709       Imaging Results Review: No pertinent imaging studies reviewed.  Other Study Results Review: No additional pertinent studies reviewed.     Administrative Statements   I have spent a total time of 20 minutes in caring for this patient on the day of the visit/encounter including Risks and benefits of tx options, Instructions for management, Patient and family education, Importance of tx compliance, Risk factor reductions, Impressions, Counseling / Coordination of care, Documenting in the medical record, Reviewing / ordering tests, medicine, procedures  , Obtaining or reviewing history  , and Communicating with other healthcare professionals .

## 2024-10-28 NOTE — PHYSICAL THERAPY NOTE
PHYSICAL THERAPY NOTE          Patient Name: Ragini Melendez  Today's Date: 10/28/2024       10/28/24 1354   PT Last Visit   PT Visit Date 10/28/24   Note Type   Note Type Treatment   Pain Assessment   Pain Assessment Tool 0-10   Pain Score No Pain   Restrictions/Precautions   Weight Bearing Precautions Per Order No   Other Precautions Multiple lines;Telemetry;Fall Risk;Pain  (SUSAN x 1)   General   Chart Reviewed Yes   Response to Previous Treatment Patient with no complaints from previous session.   Family/Caregiver Present No   Cognition   Overall Cognitive Status WFL   Arousal/Participation Alert   Attention Within functional limits   Orientation Level Oriented X4   Memory Within functional limits   Following Commands Follows all commands and directions without difficulty   Subjective   Subjective Pleasant and agreeable to participate in therapy session   Bed Mobility   Sit to Supine 5  Supervision   Additional items HOB elevated;Increased time required;Verbal cues   Additional Comments Found in bedside chair.  Left supine in bed with all needs in reach.  RN aware to set bed alarm.   Transfers   Sit to Stand 5  Supervision   Additional items Increased time required;Verbal cues   Stand to Sit 5  Supervision   Additional items Increased time required;Verbal cues   Additional Comments with RW   Ambulation/Elevation   Gait pattern Excessively slow;Short stride;Foward flexed;Decreased foot clearance;Improper Weight shift   Gait Assistance 5  Supervision  (CGA-S)   Additional items Assist x 1;Verbal cues   Assistive Device Rolling walker   Distance 70 ft x 2   Stair Management Assistance   (CGA)   Additional items Assist x 1;Verbal cues   Stair Management Technique One rail R;Step to pattern;Sideways   Number of Stairs 3   Balance   Static Sitting Good   Dynamic Sitting Fair +   Static Standing Fair   Dynamic Standing Fair -   Ambulatory  Fair -   Activity Tolerance   Activity Tolerance Patient tolerated treatment well   Nurse Made Aware RN cleared pt to be seen by PT   Assessment   Prognosis Good   Problem List Decreased strength;Decreased endurance;Impaired balance;Decreased mobility   Assessment Pt seen for PT treatment session with focus on bed mobility, functional transfers, functional mobility.  Pt making steady progress toward goals this session. Pt able to ambulate increased distance this session as well as trial stairs as a result of improving activity tolerance.  However, pt does continue to present with decreased strength and balance noted during functional mobility.  Pt remains at risk for falls 2/2 above gait deficits.  Pt left supine in bed with bed alarm intact and with all needs in reach.  Pt will benefit from skilled therapy in order to address current impairments and functional limitations. PT to follow pt and recommending level III.  The patient's AM-[facility] Basic Mobility Inpatient Short Form Raw Score is 18. A Raw score of greater than 16 suggests the patient may benefit from discharge to home. Please also refer to the recommendation of the Physical Therapist for safe discharge planning.   Goals   Patient Goals to get stronger   STG Expiration Date 11/10/24   Plan   Treatment/Interventions Functional transfer training;LE strengthening/ROM;Therapeutic exercise;Endurance training;Patient/family training;Equipment eval/education;Bed mobility;Gait training;Spoke to nursing   Progress Progressing toward goals   PT Frequency 3-5x/wk   Discharge Recommendation   Rehab Resource Intensity Level, PT III (Minimum Resource Intensity)   Equipment Recommended Walker   Walker Package Recommended Wheeled walker   Change/add to Walker Package? No   AM-PAC Basic Mobility Inpatient   Turning in Flat Bed Without Bedrails 3   Lying on Back to Sitting on Edge of Flat Bed Without Bedrails 3   Moving Bed to Chair 3   Standing Up From Chair Using Arms 3    Walk in Room 3   Climb 3-5 Stairs With Railing 3   Basic Mobility Inpatient Raw Score 18   Basic Mobility Standardized Score 41.05   [facility] Level Of Mobility   -HLM Goal 6: Walk 10 steps or more   -HLM Achieved 7: Walk 25 feet or more     Luli Vieira, PT, DPT

## 2024-10-28 NOTE — PROGRESS NOTES
Progress Note - Urology   Name: Ragini Melendez 69 y.o. female I MRN: [id number]  Unit/Bed#: -01 I Date of Admission: 10/24/2024   Date of Service: 10/28/2024 I Hospital Day: 4    Assessment & Plan  Bilateral hydronephrosis  S/p diversionary ileal conduit creation without cystectomy and bilateral ureterolysis 10/24  WBC 9.57  Hgb 8.6  Creat 2.14  SUSAN drain--340 mL / 24 hours  Ileal conduit output 4175 mL / 24 hours  Right nephrostomy aspirate: 1 mL, irrigated with 10 mL and leakage noted  Left nephrostomy aspirate: 1 mL of purulent urine, manually irrigated with 10 mL of saline  Manually irrigate left nephrostomy tube with 10 mL of sterile saline daily  Afebrile, vital signs stable  Continue PT OT  Continue to trend labs  Continue to monitor for return of bowel function  Add MiraLAX daily  Strict I&O  DVT prophylaxis and incentive spirometer    Endometrial cancer (HCC)  Followed by GYN/oncology  History of radiation as well as systemic chemotherapy over 2 years ago    CKD (chronic kidney disease) stage 4, GFR 15-29 ml/min ([facility])  Lab Results   Component Value Date    EGFR 23 10/28/2024    EGFR 24 10/27/2024    EGFR 22 10/26/2024    CREATININE 2.14 (H) 10/28/2024    CREATININE 2.05 (H) 10/27/2024    CREATININE 2.17 (H) 10/26/2024   Followed by nephrology  Patient with history of renal atrophy   Continue IV fluids per nephrology recommendations (currently D5 LR at 75 cc/h)  Continue to trend labs  Benign essential hypertension  134/79  Not currently on medication, continue to trend vital signs  Anemia due to stage 4 chronic kidney disease  (HCC)  Hgb 8.6  History of IV iron supplementation per oncology team        Subjective   69-year-old female who underwent ileal conduit creation on 10/24.  She reports tolerating her advance diet with poor appetite.  She is passing flatulence but has not yet had a bowel movement.  She is requesting MiraLAX which helps her have a bowel movement at home.  She denies any nausea or  vomiting.  She was out of bed only x 1 yesterday for ambulation.  Encouraged out of bed and ambulation multiple times a day as well as use of incentive spirometer.  She reports physical therapy did not want her ambulating without their assistance.  She has been doing well from a pain standpoint and recently medicated with Dilaudid.  She no longer has PCA pain management.  Her ileal conduit has 2 stents noted with pink stoma.  Ileal conduit draining pale yellow urine.  Bilateral nephrostomy tubes are capped.  The right one is currently leaking.  Patient reports the left nephrostomy tube was also leaking previously.  I attempted to pull back from both nephrostomy tubes.  I was unable to pull back more than 1 mL from either nephrostomy tube.  Nephrostomy tubes were manually irrigated with 10 cc of saline with the right nephrostomy tube noted to be leaking below the site.  Caps were changed.  We will schedule daily flushing of her left nephrostomy tube as her right kidney is likely nonfunctioning.    Objective :  Temp:  [98.3 °F (36.8 °C)-98.8 °F (37.1 °C)] 98.3 °F (36.8 °C)  HR:  [81-84] 81  BP: (122-136)/(71-79) 134/79  Resp:  [18] 18  SpO2:  [95 %-97 %] 95 %  O2 Device: None (Room air)    I/O         10/26 0701  10/27 0700 10/27 0701  10/28 0700 10/28 0701  10/29 0700    P.O. 240 1140     I.V. (mL/kg) 559.4 (7.8) 1888.8 (26.3)     Total Intake(mL/kg) 799.4 (11.1) 3028.8 (42.2)     Urine (mL/kg/hr) 3700 (2.2) 4175 (2.4)     Drains 100 340     Total Output 3800 4515     Net -3000.6 -1486.3                  Lines/Drains/Airways       Active Status       Name Placement date Placement time Site Days    Port A Cath 11/12/19 Left Chest 11/12/19  1221  Chest  1811    Closed/Suction Drain LLQ 10/24/24  1230  LLQ  3    Nephrostomy Left 10 Fr. 10/16/24  1114  Left  11    Nephrostomy Right 10.2 Fr. 10/16/24  1122  Right  11    Urostomy Ileal conduit RLQ 10/24/24  1158  RLQ  3                  Physical Exam  Vitals reviewed.    Constitutional:       General: She is not in acute distress.     Appearance: Normal appearance. She is not ill-appearing, toxic-appearing or diaphoretic.   HENT:      Head: Normocephalic and atraumatic.   Eyes:      General: No scleral icterus.  Cardiovascular:      Rate and Rhythm: Normal rate.      Heart sounds: Normal heart sounds.   Pulmonary:      Effort: Pulmonary effort is normal. No respiratory distress.      Breath sounds: Normal breath sounds.      Comments: Decreased bases  Abdominal:      General: Abdomen is flat. There is no distension.      Palpations: Abdomen is soft.      Tenderness: There is no abdominal tenderness. There is no right CVA tenderness, left CVA tenderness or guarding.      Comments: SUSAN drain with serosanguineous output   Genitourinary:     Comments: Ileal conduit with 2 stents noted.  Draining yellow urine.  Bilateral nephrostomy tubes noted.  Both capped.  Manually irrigated with leakage of right nephrostomy tube.  No leakage of left nephrostomy tube with manual irrigation.  Unable to pull back more than 1ml from either tube.  Return from left nephrostomy tube appears purulent  Musculoskeletal:         General: No swelling.      Cervical back: Normal range of motion.   Skin:     General: Skin is warm and dry.      Coloration: Skin is not jaundiced or pale.      Findings: No rash.   Neurological:      General: No focal deficit present.      Mental Status: She is alert and oriented to person, place, and time.      Gait: Gait normal.   Psychiatric:         Mood and Affect: Mood normal.         Behavior: Behavior normal.         Thought Content: Thought content normal.         Judgment: Judgment normal.           Lab Results: I have reviewed the following results:  Recent Labs     10/26/24  0501 10/27/24  0521 10/28/24  0505   WBC 10.33*   < > 9.57   HGB 9.1*   < > 8.6*   HCT 28.8*   < > 27.6*      < > 211   SODIUM 138   < > 141   K 4.4   < > 4.5      < > 107   CO2 23   < >  25   BUN 31*   < > 27*   CREATININE 2.17*   < > 2.14*   GLUC 113   < > 111   MG 1.6*  --   --     < > = values in this interval not displayed.       Imaging Results Review: No pertinent imaging studies reviewed.  Other Study Results Review: No additional pertinent studies reviewed.    VTE Pharmacologic Prophylaxis: VTE covered by:  heparin (porcine), Subcutaneous, 5,000 Units at 10/28/24 0757     VTE Mechanical Prophylaxis: sequential compression device

## 2024-10-28 NOTE — PROGRESS NOTES
NEPHROLOGY PROGRESS NOTE   Ragini Melendez 69 y.o. female MRN: [id number]  Unit/Bed#: MS Spe[facility]8-01 Encounter: [id number]    ASSESSMENT & PLAN:     Assessment & Plan  CKD (chronic kidney disease) stage 4, GFR 15-29 ml/min ([facility])  Etiology: suspect secondary to hypertension, obstructive uropathy in the setting of bilateral ureteral strictures, underlying glomerular pathology as well as age-related nephron loss as well as due to sequelae of recurrent acute kidney injury  Baseline creatinine around 2.0 to 2.5 mg/dL  Renal function currently stable at creatinine 2.14 mg/dL  Patient still have good urine output around 4.1 L in last 24 hours and has not been tolerating much p.o. intake.  Would continue current D5 LR at 75 mL/h for now.  Continue to monitor volume status.  Clinically appears euvolemic.  If urine output decreases we will decrease the rate of IV fluid as well  Continue to avoid nephrotoxins  Benign essential hypertension  Blood pressure is stable and is at goal.  Currently not on any medications.  Avoid hypotension    Hypomagnesemia  -Magnesium level low at 1.6 on 10/26, will recheck level tomorrow.  If still low may need to replace  Anemia due to stage 4 chronic kidney disease  (HCC)  Current hemoglobin trended down to 8.6 g/dL  Previously received IV iron supplementation with oncology  Avoid KENNEY given malignancy  Recommend anemia management per hematology oncology  Bilateral hydronephrosis  Status post ileal conduit urine diversion (no cystectomy) Bilateral ureterolysis 10/24/2024  Urology following.  Continue management per urology  Endometrial cancer (HCC)  Has been greater than 2 years off treatment.  per gyn oncology feels this is prolonged remission  GYn oncology following     Discussed with primary team that renal function is stable, would continue current IV fluid as still with significant urine output and primary team agreed with the plan    SUBJECTIVE:  Patient denies any new complaints, no nausea  vomiting    OBJECTIVE:  Current Weight: Weight - Scale: 71.7 kg (158 lb)  Vitals:    10/28/24 1530   BP: 117/70   Pulse: 79   Resp: 16   Temp: 98.7 °F (37.1 °C)   SpO2: 96%       Intake/Output Summary (Last 24 hours) at 10/28/2024 1540  Last data filed at 10/28/2024 1445  Gross per 24 hour   Intake 2163.75 ml   Output 3705 ml   Net -1541.25 ml       Physical Exam  General:  Ill looking, awake.  Eyes: Conjunctivae pink,  Sclera anicteric  ENT: lips and mucous membranes moist  Neck: supple   Chest: Clear to Auscultation both lungs,  no crackles, ronchus or wheezing.  CVS: S1 & S2 present, normal rate, regular rhythm, no murmur.  Abdomen: soft, non-tender, non-distended, Bowel sounds normoactive.  Finding of ileal conduit  Extremities: no edema of  legs  Skin: no rash  Neuro: awake, alert, oriented  Psych: Mood and affect appropriate      Medications:    Current Facility-Administered Medications:     acetaminophen (TYLENOL) tablet 650 mg, 650 mg, Oral, Q6H CHRISTIANO, Demetrius Lowe MD, 650 mg at 10/28/24 1120    ARIPiprazole (ABILIFY) tablet 20 mg, 20 mg, Oral, QAM, Demetrius Lowe MD, 20 mg at 10/28/24 0756    calcitriol (ROCALTROL) capsule 0.25 mcg, 0.25 mcg, Oral, Once per day on Monday Wednesday Friday, Demetrius Lowe MD, 0.25 mcg at 10/28/24 0757    calcium acetate (PHOSLO) capsule 667 mg, 667 mg, Oral, TID With Meals, Demetrius Lowe MD, 667 mg at 10/28/24 1120    calcium carbonate-vitamin D 500 mg-5 mcg tablet 1 tablet, 1 tablet, Oral, Daily With Breakfast, Demetrius Lowe MD, 1 tablet at 10/28/24 0756    Cholecalciferol (VITAMIN D3) tablet 4,000 Units, 4,000 Units, Oral, Daily, Demetrius Lowe MD, 4,000 Units at 10/28/24 0756    cyanocobalamin (VITAMIN B-12) tablet 50 mcg, 50 mcg, Oral, Daily, Demetrius Lowe MD, 50 mcg at 10/28/24 0757    dextrose 5 % in lactated Ringer's infusion, 75 mL/hr, Intravenous, Continuous, Wilton Multani DO, Last Rate: 75 mL/hr at 10/28/24  1416, 75 mL/hr at 10/28/24 1416    docusate sodium (COLACE) capsule 100 mg, 100 mg, Oral, BID, Demetrius Lowe MD, 100 mg at 10/28/24 0756    fluticasone (FLONASE) 50 mcg/act nasal spray 1 spray, 1 spray, Nasal, Daily, Demetrius Lowe MD, 1 spray at 10/28/24 0800    folic acid (FOLVITE) tablet 1 mg, 1 mg, Oral, HS, Demetrius Lowe MD, 1 mg at 10/27/24 2124    heparin (porcine) subcutaneous injection 5,000 Units, 5,000 Units, Subcutaneous, Q12H CHRISTIANO, 5,000 Units at 10/28/24 0757 **AND** [COMPLETED] Platelet count, , , Once, Demetrius Lowe MD    HYDROmorphone (DILAUDID) injection 0.5 mg, 0.5 mg, Intravenous, Q1H PRN, RAFITA Simpson    HYDROmorphone (DILAUDID) tablet 2 mg, 2 mg, Oral, Q4H PRN, RAFITA Simpson, 2 mg at 10/28/24 0457    HYDROmorphone (DILAUDID) tablet 4 mg, 4 mg, Oral, Q4H PRN, RAFITA Simpson    loratadine (CLARITIN) tablet 10 mg, 10 mg, Oral, Daily, Demetrius Lowe MD, 10 mg at 10/28/24 0757    methocarbamol (ROBAXIN) tablet 750 mg, 750 mg, Oral, Q6H CHRISTIANO, RAFITA Simpson, 750 mg at 10/28/24 1120    multivitamin stress formula tablet 1 tablet, 1 tablet, Oral, QAM, Demetrius Lowe MD, 1 tablet at 10/28/24 0800    naloxone (NARCAN) injection 0.1 mg, 0.1 mg, Intravenous, Q1MIN PRN, Vaishali Amanda MD    pantoprazole (PROTONIX) EC tablet 20 mg, 20 mg, Oral, Early Morning, Demetrius Lowe MD, 20 mg at 10/28/24 0458    polyethylene glycol (MIRALAX) packet 17 g, 17 g, Oral, Daily, RAFITA Greco, 17 g at 10/28/24 1120    venlafaxine (EFFEXOR-XR) 24 hr capsule 37.5 mg, 37.5 mg, Oral, Daily, Demetrius Lowe MD, 37.5 mg at 10/28/24 0757    Invasive Devices:        Lab Results:   Results from last 7 days   Lab Units 10/28/24  0505 10/27/24  0521 10/26/24  0501   WBC Thousand/uL 9.57 9.35 10.33*   HEMOGLOBIN g/dL 8.6* 7.9* 9.1*   HEMATOCRIT % 27.6* 25.4* 28.8*   PLATELETS Thousands/uL 211 180 185   POTASSIUM mmol/L 4.5 4.4 4.4  "  CHLORIDE mmol/L 107 108 107   CO2 mmol/L 25 24 23   BUN mg/dL 27* 28* 31*   CREATININE mg/dL 2.14* 2.05* 2.17*   CALCIUM mg/dL 9.1 8.6 8.7   MAGNESIUM mg/dL  --   --  1.6*       Previous work up:         Portions of the record may have been created with voice recognition software. Occasional wrong word or \"sound a like\" substitutions may have occurred due to the inherent limitations of voice recognition software. Read the chart carefully and recognize, using context, where substitutions have occurred.If you have any questions, please contact the dictating provider.    "

## 2024-10-28 NOTE — ASSESSMENT & PLAN NOTE
Status post ileal conduit urine diversion (no cystectomy) Bilateral ureterolysis 10/24/2024  Urology following.  Continue management per urology

## 2024-10-28 NOTE — DISCHARGE INSTR - OTHER ORDERS
" Ostomy Care:  -Stoma Measurement: 1 1/4 inch, template in belongings (Measure stoma weekly for next 6-8 weeks- stoma will decrease in size due to decrease in swelling)     -Appliance Used During Bag Change: Convatec One Piece Flat Pouch  -Accessories Used: 3m no sting skin prep    *Can shower with pouch on or off. Make sure to dry off pouch after shower.     Bag Change Steps:  1. Peel back pouch using push-pull method, may use non-alcohol adhesive remover. Remove pouch from top to bottom.   2. Use warm water only to cleanse skin around the stoma (alise-stomal skin).   3. Make sure all adhesive residue is removed and skin is dry and not oily.  4. Measure stoma size using measuring guide and trace correct measurements onto back of pouch.  5. Then cut or mold the backing of pouch out to correct shape/size.  6. Apply skin prep to intact alise-stomal skin and allow to dry.   7. Feed stents into pouch and place pouch over stoma and onto skin.  8. Use warmth of hand to apply gentle pressure to help backing of pouch to adhere well to skin.  9-Apply Urostomy adaptor to spout of pouch and attach to bedside drainage bag if needed.     **If the skin is open, moist or fragile, Crusting can be done prior to pouch application (before step 6) to create dry skin and assist with pouch adherence- steps are listed below.      TIPS:  Empty pouch when 1/3 -1/2 full.  Change pouch every 4-5 days or if signs of leaking.    Crusting as needed for moist, red, open skin around the stoma: (Done prior to pouch application)   Apply stoma powder to excoriation, move excess powder away with hand  Use no sting barrier to pat area to form \"crust\"  Repeat X2 before placing new ostomy pouch      Overnight bag--attach overnight bag to urostomy pouch at bedtime.  Remove your overnight bag in the morning and empty urine into the toilet.  Rinse bag with warm water and drain.  Fill bag about 1/2 way with 1 part white vinegar and 3 parts water solution.  " Soak for 20 minutes.  Drain bag.  Rinse bag with warm water, drain, and hang up to dry.      Ostomy Online Education Resources:   Www.ostomy.org   Www.[employer].com   Www.coloplast.us   Www.[employer]    Ostomy Suppliers:  [employer]--[phone] OR wwwUS Emergency Registry  [employer]--[phone] OR [employer]--[phone] OR [employer]    [facility] Ostomy [facility]--[phone]                  Skin care plans:  1-Hydraguard to bilateral sacrum, buttock and heels BID and PRN  2-Elevate heels to offload pressure.  3-Ehob cushion in chair when out of bed.  4-Moisturize skin daily with skin nourishing cream.  5-Turn/reposition q2h for pressure re-distribution on skin.   6-Left nephrostomy tube site- Cleanse with soap and water, pat dry. Apply Melgisorb AG over skin like a split gauze, then top with Split gauze and secure with minimal paper tape. Change daily and more often if needed for soilage.

## 2024-10-28 NOTE — CASE MANAGEMENT
Case Management Discharge Planning Note    Patient name Ragini Melendez  Location /-01 MRN [id number]  : 1955 Date 10/28/2024       Current Admission Date: 10/24/2024  Current Admission Diagnosis:Bilateral hydronephrosis   Patient Active Problem List    Diagnosis Date Noted Date Diagnosed    Bilateral hydronephrosis 10/24/2024     Anemia due to stage 4 chronic kidney disease  ([facility]) 10/24/2024     Hypertensive CKD (chronic kidney disease) 2024     Thrombocytopenia, unspecified ([facility]) 2024     Overweight with body mass index (BMI) of 28 to 28.9 in adult 2023     Ureteral stricture 2023     Left-sided chest wall pain 2023     Preop cardiovascular exam 2023     CKD (chronic kidney disease) stage 4, GFR 15-29 ml/min ([facility])      Other proteinuria      History of DVT (deep vein thrombosis) 2023     Hemorrhagic cystitis w/ pyelonephritis 2023     Abnormal iron saturation 2023     Dry mouth 2023     Nephrotoxicity 10/03/2022     Vaginal atrophy 2022     Depression, recurrent ([facility]) 2022     Chronic UTI 2021     Secondary malignant neoplasm of other specified sites ([facility]) 2021     Closed nondisplaced fracture of right pubis with delayed healing 2021     Need for follow-up by  2021     Dental disorder 2021     Osteoporosis 2020     Pathological fracture due to osteoporosis 2020     Bilateral piriformis syndrome      Chronic pain syndrome      Myofascial pain syndrome      Nephrostomy status (HCC) 10/14/2020     Overactive bladder 2020     Acute left-sided low back pain without sciatica 2020     Obstruction of right ureter 2020     Hypomagnesemia 10/18/2019     Severe protein-calorie malnutrition (HCC) 10/03/2019     Bilateral lower extremity edema 2019     Acute on chronic kidney disease 2019     Anemia 2019     Chemotherapy induced neutropenia  (HCC) 08/30/2019     Encounter for central line care 07/16/2019     Encounter for follow-up surveillance of endometrial cancer 03/17/2019     Benign essential hypertension 12/19/2017     Endometrial cancer (HCC) 11/29/2017     Dyslipidemia 04/01/2014     Major depression, chronic 10/07/2013       LOS (days): 4  Geometric Mean LOS (GMLOS) (days): 1.8  Days to GMLOS:-2.6     OBJECTIVE:  Risk of Unplanned Readmission Score: 23.12         Current admission status: Inpatient   Preferred Pharmacy:   [id number] - [place] - [address]  [address]  [place] [address]  Phone: [phone] Fax: [phone]    [facility] - [place] - [address]  [address]  [place] [address]  Phone: [phone] Fax: [phone]    Primary Care Provider: Emely Yost DO    Primary Insurance: [employer]REGOR SUTTON  Secondary Insurance:     DISCHARGE DETAILS:    Discharge planning discussed with:: Patient  Freedom of Choice: Yes  Comments - Freedom of Choice: Discussed FOC  CM contacted family/caregiver?: No- see comments (Declined)     Other Referral/Resources/Interventions Provided:  Referral Comments: This CM spoke with patient regarding HHC, patient agreeable to referral to  HH, entered in [facility], agreeable to blanket referral in the event  cannot accept

## 2024-10-29 ENCOUNTER — HOME HEALTH ADMISSION (OUTPATIENT)
Dept: HOME HEALTH SERVICES | Facility: HOME HEALTHCARE | Age: 69
End: 2024-10-29
Payer: COMMERCIAL

## 2024-10-29 LAB
ALBUMIN SERPL BCG-MCNC: 2.6 G/DL (ref 3.5–5)
ALP SERPL-CCNC: 76 U/L (ref 34–104)
ALT SERPL W P-5'-P-CCNC: 9 U/L (ref 7–52)
ANION GAP SERPL CALCULATED.3IONS-SCNC: 8 MMOL/L (ref 4–13)
AST SERPL W P-5'-P-CCNC: 9 U/L (ref 13–39)
BASOPHILS # BLD AUTO: 0.05 THOUSANDS/ΜL (ref 0–0.1)
BASOPHILS NFR BLD AUTO: 1 % (ref 0–1)
BILIRUB SERPL-MCNC: 0.26 MG/DL (ref 0.2–1)
BUN SERPL-MCNC: 25 MG/DL (ref 5–25)
CALCIUM ALBUM COR SERPL-MCNC: 9.4 MG/DL (ref 8.3–10.1)
CALCIUM SERPL-MCNC: 8.3 MG/DL (ref 8.4–10.2)
CHLORIDE SERPL-SCNC: 107 MMOL/L (ref 96–108)
CO2 SERPL-SCNC: 26 MMOL/L (ref 21–32)
CREAT SERPL-MCNC: 2.02 MG/DL (ref 0.6–1.3)
EOSINOPHIL # BLD AUTO: 0.12 THOUSAND/ΜL (ref 0–0.61)
EOSINOPHIL NFR BLD AUTO: 1 % (ref 0–6)
ERYTHROCYTE [DISTWIDTH] IN BLOOD BY AUTOMATED COUNT: 13.1 % (ref 11.6–15.1)
GFR SERPL CREATININE-BSD FRML MDRD: 24 ML/MIN/1.73SQ M
GLUCOSE SERPL-MCNC: 268 MG/DL (ref 65–140)
HCT VFR BLD AUTO: 29.1 % (ref 34.8–46.1)
HGB BLD-MCNC: 9.1 G/DL (ref 11.5–15.4)
IMM GRANULOCYTES # BLD AUTO: 0.13 THOUSAND/UL (ref 0–0.2)
IMM GRANULOCYTES NFR BLD AUTO: 1 % (ref 0–2)
LYMPHOCYTES # BLD AUTO: 0.91 THOUSANDS/ΜL (ref 0.6–4.47)
LYMPHOCYTES NFR BLD AUTO: 9 % (ref 14–44)
MAGNESIUM SERPL-MCNC: 1.5 MG/DL (ref 1.9–2.7)
MCH RBC QN AUTO: 29.7 PG (ref 26.8–34.3)
MCHC RBC AUTO-ENTMCNC: 31.3 G/DL (ref 31.4–37.4)
MCV RBC AUTO: 95 FL (ref 82–98)
MONOCYTES # BLD AUTO: 0.76 THOUSAND/ΜL (ref 0.17–1.22)
MONOCYTES NFR BLD AUTO: 7 % (ref 4–12)
NEUTROPHILS # BLD AUTO: 8.28 THOUSANDS/ΜL (ref 1.85–7.62)
NEUTS SEG NFR BLD AUTO: 81 % (ref 43–75)
NRBC BLD AUTO-RTO: 0 /100 WBCS
PLATELET # BLD AUTO: 251 THOUSANDS/UL (ref 149–390)
PMV BLD AUTO: 9.5 FL (ref 8.9–12.7)
POTASSIUM SERPL-SCNC: 4.5 MMOL/L (ref 3.5–5.3)
PROT SERPL-MCNC: 5.5 G/DL (ref 6.4–8.4)
RBC # BLD AUTO: 3.06 MILLION/UL (ref 3.81–5.12)
SODIUM SERPL-SCNC: 141 MMOL/L (ref 135–147)
WBC # BLD AUTO: 10.25 THOUSAND/UL (ref 4.31–10.16)

## 2024-10-29 PROCEDURE — 99232 SBSQ HOSP IP/OBS MODERATE 35: CPT | Performed by: INTERNAL MEDICINE

## 2024-10-29 PROCEDURE — 99024 POSTOP FOLLOW-UP VISIT: CPT | Performed by: NURSE PRACTITIONER

## 2024-10-29 PROCEDURE — 80053 COMPREHEN METABOLIC PANEL: CPT | Performed by: NURSE PRACTITIONER

## 2024-10-29 PROCEDURE — 97530 THERAPEUTIC ACTIVITIES: CPT

## 2024-10-29 PROCEDURE — 97110 THERAPEUTIC EXERCISES: CPT

## 2024-10-29 PROCEDURE — 97116 GAIT TRAINING THERAPY: CPT

## 2024-10-29 PROCEDURE — 85025 COMPLETE CBC W/AUTO DIFF WBC: CPT | Performed by: NURSE PRACTITIONER

## 2024-10-29 PROCEDURE — 97167 OT EVAL HIGH COMPLEX 60 MIN: CPT

## 2024-10-29 RX ORDER — LANOLIN ALCOHOL/MO/W.PET/CERES
6 CREAM (GRAM) TOPICAL
Status: DISCONTINUED | OUTPATIENT
Start: 2024-10-29 | End: 2024-10-30 | Stop reason: HOSPADM

## 2024-10-29 RX ORDER — LANOLIN ALCOHOL/MO/W.PET/CERES
6 CREAM (GRAM) TOPICAL
Status: DISCONTINUED | OUTPATIENT
Start: 2024-10-29 | End: 2024-10-29

## 2024-10-29 RX ORDER — MAGNESIUM SULFATE HEPTAHYDRATE 40 MG/ML
2 INJECTION, SOLUTION INTRAVENOUS ONCE
Status: COMPLETED | OUTPATIENT
Start: 2024-10-29 | End: 2024-10-29

## 2024-10-29 RX ADMIN — POLYETHYLENE GLYCOL 3350 17 G: 17 POWDER, FOR SOLUTION ORAL at 08:52

## 2024-10-29 RX ADMIN — HEPARIN SODIUM 5000 UNITS: 5000 INJECTION INTRAVENOUS; SUBCUTANEOUS at 21:08

## 2024-10-29 RX ADMIN — HEPARIN SODIUM 5000 UNITS: 5000 INJECTION INTRAVENOUS; SUBCUTANEOUS at 08:52

## 2024-10-29 RX ADMIN — ARIPIPRAZOLE 20 MG: 10 TABLET ORAL at 08:51

## 2024-10-29 RX ADMIN — MAGNESIUM SULFATE HEPTAHYDRATE 2 G: 40 INJECTION, SOLUTION INTRAVENOUS at 14:33

## 2024-10-29 RX ADMIN — METHOCARBAMOL 750 MG: 750 TABLET ORAL at 14:33

## 2024-10-29 RX ADMIN — Medication 4000 UNITS: at 08:52

## 2024-10-29 RX ADMIN — VENLAFAXINE HYDROCHLORIDE 37.5 MG: 37.5 CAPSULE, EXTENDED RELEASE ORAL at 08:52

## 2024-10-29 RX ADMIN — METHOCARBAMOL 750 MG: 750 TABLET ORAL at 21:08

## 2024-10-29 RX ADMIN — METHOCARBAMOL 750 MG: 750 TABLET ORAL at 02:26

## 2024-10-29 RX ADMIN — CALCIUM ACETATE 667 MG: 667 CAPSULE ORAL at 17:41

## 2024-10-29 RX ADMIN — DOCUSATE SODIUM 100 MG: 100 CAPSULE, LIQUID FILLED ORAL at 08:52

## 2024-10-29 RX ADMIN — Medication 6 MG: at 02:25

## 2024-10-29 RX ADMIN — VITAM B12 50 MCG: 100 TAB at 08:52

## 2024-10-29 RX ADMIN — PANTOPRAZOLE SODIUM 20 MG: 20 TABLET, DELAYED RELEASE ORAL at 06:26

## 2024-10-29 RX ADMIN — ACETAMINOPHEN 650 MG: 325 TABLET, FILM COATED ORAL at 02:26

## 2024-10-29 RX ADMIN — CALCIUM ACETATE 667 MG: 667 CAPSULE ORAL at 11:34

## 2024-10-29 RX ADMIN — CALCIUM ACETATE 667 MG: 667 CAPSULE ORAL at 08:52

## 2024-10-29 RX ADMIN — FOLIC ACID 1 MG: 1 TABLET ORAL at 21:08

## 2024-10-29 RX ADMIN — B-COMPLEX W/ C & FOLIC ACID TAB 1 TABLET: TAB at 08:56

## 2024-10-29 RX ADMIN — Medication 1 TABLET: at 08:51

## 2024-10-29 RX ADMIN — LORATADINE 10 MG: 10 TABLET ORAL at 08:52

## 2024-10-29 RX ADMIN — METHOCARBAMOL 750 MG: 750 TABLET ORAL at 08:52

## 2024-10-29 RX ADMIN — ACETAMINOPHEN 650 MG: 325 TABLET, FILM COATED ORAL at 08:52

## 2024-10-29 RX ADMIN — ACETAMINOPHEN 650 MG: 325 TABLET, FILM COATED ORAL at 21:07

## 2024-10-29 RX ADMIN — ACETAMINOPHEN 650 MG: 325 TABLET, FILM COATED ORAL at 14:33

## 2024-10-29 NOTE — ASSESSMENT & PLAN NOTE
-Magnesium level low 1.5 on 10/28.  Replaced with IV magnesium sulfate 2 g, recheck level tomorrow

## 2024-10-29 NOTE — PHYSICAL THERAPY NOTE
PHYSICAL THERAPY TREATMENT  NAME:  Ragini Melendez  DATE: 10/29/24    AGE:   69 y.o.  Mrn:   [id number]  ADMIT DX:  Ureteral stricture [N13.5]    Past Medical History:   Diagnosis Date    Anemia     Anxiety     Cancer (HCC)     ENDOMETRIAL    Chemotherapy induced neutropenia (HCC) 08/30/2019    Chronic kidney disease     CKD (chronic kidney disease) stage 3, GFR 30-59 ml/min (HCC)     COVID     Fall 2022    Depression     DVT (deep venous thrombosis) (HCC) 07/19/2019    RIGHT LEG    Endometrial cancer (HCC) 12/2017    GERD (gastroesophageal reflux disease)     H/O gastric bypass     Hard to intubate     pt denies AS OF 7/25/23    History of chemotherapy     started 12/2021endometrial cancer- done 12/23/22    History of DVT in adulthood     RLE    History of transfusion 04/13/2023    Hyperglycemia     vx type 2 dm -- last assessed 4/1/14; resolved 11/7/17    Hyperlipidemia     Hypertension     in past- no meds at present    Iron deficiency anemia     iron infusions weekly    Iron deficiency anemia secondary to inadequate dietary iron intake 02/08/2023    OAB (overactive bladder)     Port-A-Cath in place     Wears glasses      Past Surgical History:   Procedure Laterality Date    ABDOMINAL SURGERY      GASTRIC BYPASS; 2001    BREAST BIOPSY Right 06/28/2019    core biopsy; benign    CHOLECYSTECTOMY      at the time of gastric bypass    COLONOSCOPY      CT NEEDLE BIOPSY LYMPH NODE  07/08/2019    CYSTECTOMY, RADICAL WITH ILEOCONDUIT N/A 10/24/2024    Procedure: ILEAL CONDUIT urinary diversion (No cystectomy), BILATERAL UTERO-LYSIS;  Surgeon: Demetrius Lowe MD;  [facility];  Service: Urology    FL GUIDED CENTRAL VENOUS ACCESS DEVICE INSERTION  11/12/2019    FL RETROGRADE PYELOGRAM  03/30/2023    FL RETROGRADE PYELOGRAM  05/09/2023    FL RETROGRADE PYELOGRAM  8/1/2023    GASTRIC BYPASS      HYSTERECTOMY Bilateral 2017    total abdominal with salpingo-oophorectomy    IR NEPHROSTOMY TO NEPHROURETERAL  STENT  06/09/2022    IR NEPHROSTOMY TO NEPHROURETERAL STENT  12/20/2022    IR NEPHROSTOMY TO NEPHROURETERAL STENT  8/8/2023    IR NEPHROSTOMY TUBE CHECK/CHANGE/REPOSITION/REINSERTION/UPSIZE  05/27/2022    IR NEPHROSTOMY TUBE CHECK/CHANGE/REPOSITION/REINSERTION/UPSIZE  11/10/2023    IR NEPHROSTOMY TUBE CHECK/CHANGE/REPOSITION/REINSERTION/UPSIZE  2/9/2024    IR NEPHROSTOMY TUBE CHECK/CHANGE/REPOSITION/REINSERTION/UPSIZE  4/12/2024    IR NEPHROSTOMY TUBE CHECK/CHANGE/REPOSITION/REINSERTION/UPSIZE  7/12/2024    IR NEPHROSTOMY TUBE CHECK/CHANGE/REPOSITION/REINSERTION/UPSIZE  10/16/2024    IR NEPHROSTOMY TUBE PLACEMENT  07/26/2019    IR NEPHROSTOMY TUBE PLACEMENT  05/27/2023    IR NEPHROURETERAL STENT CHECK/CHANGE/REPOSITION  04/06/2021    IR NEPHROURETERAL STENT CHECK/CHANGE/REPOSITION  06/04/2021    IR NEPHROURETERAL STENT CHECK/CHANGE/REPOSITION  09/03/2021    IR NEPHROURETERAL STENT CHECK/CHANGE/REPOSITION  12/07/2021    IR NEPHROURETERAL STENT CHECK/CHANGE/REPOSITION  03/08/2022    IR NEPHROURETERAL STENT CHECK/CHANGE/REPOSITION  05/10/2022    IR NEPHROURETERAL STENT CHECK/CHANGE/REPOSITION  09/19/2022    IR PICC LINE  09/27/2019    IR PORT PLACEMENT  07/26/2019    IR PORT PLACEMENT      IR PORT REMOVAL  09/20/2019    OOPHORECTOMY Bilateral 2017    DE CYSTO BLADDER W/URETERAL CATHETERIZATION Right 03/30/2023    Procedure: CYSTOSCOPY RETROGRADE PYELOGRAM WITH exchange of STENT URETERAL;  Surgeon: Demetrius Lowe MD;  [facility];  Service: Urology    DE CYSTO BLADDER W/URETERAL CATHETERIZATION Bilateral 05/09/2023    Procedure: CYSTOSCOPY, BILATERAL RETROGRADE PYELOGRAM, WITH LEFT INSERTION STENT URETERAL, RIGHT URTERAL STENT EXCHANGE;  Surgeon: Nicola Hannah MD;  [facility];  Service: Urology    DE CYSTO BLADDER W/URETERAL CATHETERIZATION Bilateral 8/1/2023    Procedure: CYSTOSCOPY BILATERAL RETROGRADE  WITH REMOVAL BILATERAL  STENT URETERAL;  Surgeon: Demetrius Lowe MD;   [facility];  Service: Urology    DC CYSTO W/INSERT URETERAL STENT Right 03/30/2023    Procedure: EXCHANGE STENT URETERAL;  Surgeon: Demetrius Lowe MD;  [facility];  Service: Urology    DC INSJ TUNNELED CTR VAD W/SUBQ PORT AGE 5 YR/> Left 11/12/2019    Procedure: INSERTION VENOUS PORT ( PORT-A-CATH) IR;  Surgeon: Edilberto Guzman DO;  [facility];  Service: Interventional Radiology    DC LAPS ABD PRTM&OMENTUM DX W/WO SPEC BR/WA SPX N/A 12/19/2017    Procedure: LAPAROSCOPY DIAGNOSTIC;  Surgeon: Evgeny So MD;  [facility];  Service: Gynecology Oncology    DC LAPS W/RAD HYST W/BILAT LMPHADEC RMVL TUBE/OVARY N/A 12/19/2017    Procedure: HYSTERECTOMY LAPAROSCOPIC TOTAL (LTH) W/ ROBOTICS; BILATERAL SALPINGOOPHERECTOMY; LYMPH NODE DISSECTION; lysis of adhesions;  Surgeon: Evgeny So MD;  [facility];  Service: Gynecology Oncology    REMOVAL URETERAL STENT Bilateral 8/1/2023    Procedure: REMOVAL STENT URETERAL;  Surgeon: Demetrius Lowe MD;  [facility];  Service: Urology    TONSILLECTOMY      US GUIDED BREAST BIOPSY RIGHT COMPLETE Right 06/28/2019       Length Of Stay: 5        10/29/24 1350   PT Last Visit   PT Visit Date 10/29/24   Note Type   Note Type Treatment   Pain Assessment   Pain Assessment Tool 0-10   Pain Score No Pain   Restrictions/Precautions   Weight Bearing Precautions Per Order No   Braces or Orthoses   (none)   Other Precautions Chair Alarm;Bed Alarm;Multiple lines;Fall Risk   General   Chart Reviewed Yes   Additional Pertinent History pt reports having sair glide at home; no concerns for d/c and will have brother and sister also assiting her on d/c prn.   Response to Previous Treatment Patient with no complaints from previous session.   Family/Caregiver Present No   Cognition   Overall Cognitive Status WFL   Arousal/Participation Alert;Cooperative   Attention Within functional limits   Orientation Level Oriented X4  "  Memory Within functional limits   Following Commands Follows all commands and directions without difficulty   Comments cooperative and motivated   Subjective   Subjective \"I feel pretty good\"   Bed Mobility   Supine to Sit 5  Supervision   Additional items Bedrails   Sit to Supine 5  Supervision   Additional items Bedrails   Additional Comments pt was able to perform Sup> sit transitions w/ S from flat bed surface and use of bedrails following instruction from PT on best technique.   Transfers   Sit to Stand 5  Supervision   Additional items Increased time required   Stand to Sit 5  Supervision   Additional items Increased time required   Additional Comments use of RW in room and around obstacles w/o difficulty or LOB- slow carlos noted w/o LOB   Ambulation/Elevation   Gait pattern Excessively slow;Short stride   Gait Assistance 5  Supervision   Additional items Verbal cues   Assistive Device Rolling walker   Distance 120+150'   Stair Management Assistance   (CGA)   Stair Management Technique One rail R;Step to pattern;Sideways   Number of Stairs 3   Ambulation/Elevation Additional Comments pt reports she willhave A from family upon entering home and uses stair glide to access second floor when needed- although she feels she will sleep on couch for ease on initial d/c home   Balance   Static Sitting Good   Dynamic Sitting Fair +   Static Standing Fair +   Dynamic Standing Fair   Ambulatory Fair  (rw)   Endurance Deficit   Endurance Deficit Yes   Activity Tolerance   Activity Tolerance Patient tolerated treatment well   Nurse Made Aware yes   Exercises   Knee AROM Short Arc Quad   (x2)   Knee AROM Long Arc Quad Sitting;15 reps;Right;Left  (x3)   Ankle Pumps Sitting;25 reps  (x3)   Marching Standing;10 reps  (x4)   Balance training  standing balance + ST x5 w/ marching x10 on each stand attempt   Assessment   Prognosis Good   Problem List Decreased endurance;Impaired balance;Decreased mobility   Assessment pt " making excellent progress toward goals. was able to ambualte increaesd distacnes + negotiate obstacles w/o LOB or dififuclty w/ slow careful carlos and use of RW. negotiates steps w/ CGA. pt reprots no concerns for d/c home. pt reports will have additional support from sister and brother on d/c. PT recommending home w/ home PT/ needs new RW for home and additional family support. Level 3 resources on d/c.   Barriers to Discharge None   Goals   Patient Goals to go home   STG Expiration Date 11/10/24   PT Treatment Day 1   Plan   Treatment/Interventions ADL retraining;Functional transfer training;LE strengthening/ROM;Elevations;Therapeutic exercise;Endurance training;Patient/family training;Gait training;Compensatory technique education;Spoke to nursing;Spoke to case management;Spoke to advanced practitioner;OT   Progress Progressing toward goals   PT Frequency 3-5x/wk   Discharge Recommendation   Rehab Resource Intensity Level, PT III (Minimum Resource Intensity)   Equipment Recommended Walker   Walker Package Recommended Wheeled walker   AM-PAC Basic Mobility Inpatient   Turning in Flat Bed Without Bedrails 4   Lying on Back to Sitting on Edge of Flat Bed Without Bedrails 4   Moving Bed to Chair 3   Standing Up From Chair Using Arms 3   Walk in Room 3   Climb 3-5 Stairs With Railing 3   Basic Mobility Inpatient Raw Score 20   Basic Mobility Standardized Score 43.99   [facility] Highest Level Of Mobility   -HLM Goal 6: Walk 10 steps or more   -HLM Achieved 7: Walk 25 feet or more   Education   Education Provided Mobility training;Home exercise program   Patient Reinforcement needed;Demonstrates verbal understanding;Explanation/teachback used;Demonstrates acceptance/verbal understanding   End of Consult   Patient Position at End of Consult Supine;Bed/Chair alarm activated;All needs within reach     The patient's AM-PAC Basic Mobility Inpatient Short Form Raw Score is 20. A Raw score of greater than 16 suggests  the patient may benefit from discharge to home. Please also refer to the recommendation of the Physical Therapist for safe discharge planning.

## 2024-10-29 NOTE — PLAN OF CARE
Problem: PAIN - ADULT  Goal: Verbalizes/displays adequate comfort level or baseline comfort level  Description: Interventions:  - Encourage patient to monitor pain and request assistance  - Assess pain using appropriate pain scale  - Administer analgesics based on type and severity of pain and evaluate response  - Implement non-pharmacological measures as appropriate and evaluate response  - Consider cultural and social influences on pain and pain management  - Notify physician/advanced practitioner if interventions unsuccessful or patient reports new pain  Outcome: Progressing     Problem: INFECTION - ADULT  Goal: Absence or prevention of progression during hospitalization  Description: INTERVENTIONS:  - Assess and monitor for signs and symptoms of infection  - Monitor lab/diagnostic results  - Monitor all insertion sites, i.e. indwelling lines, tubes, and drains  - Monitor endotracheal if appropriate and nasal secretions for changes in amount and color  - [place] appropriate cooling/warming therapies per order  - Administer medications as ordered  - Instruct and encourage patient and family to use good hand hygiene technique  - Identify and instruct in appropriate isolation precautions for identified infection/condition  Outcome: Progressing  Goal: Absence of fever/infection during neutropenic period  Description: INTERVENTIONS:  - Monitor WBC    Outcome: Progressing     Problem: SAFETY ADULT  Goal: Patient will remain free of falls  Description: INTERVENTIONS:  - Educate patient/family on patient safety including physical limitations  - Instruct patient to call for assistance with activity   - Consult OT/PT to assist with strengthening/mobility   - Keep Call bell within reach  - Keep bed low and locked with side rails adjusted as appropriate  - Keep care items and personal belongings within reach  - Initiate and maintain comfort rounds  - Make Fall Risk Sign visible to staff  - Apply yellow socks and bracelet  for high fall risk patients  - Consider moving patient to room near nurses station  Outcome: Progressing  Goal: Maintain or return to baseline ADL function  Description: INTERVENTIONS:  -  Assess patient's ability to carry out ADLs; assess patient's baseline for ADL function and identify physical deficits which impact ability to perform ADLs (bathing, care of mouth/teeth, toileting, grooming, dressing, etc.)  - Assess/evaluate cause of self-care deficits   - Assess range of motion  - Assess patient's mobility; develop plan if impaired  - Assess patient's need for assistive devices and provide as appropriate  - Encourage maximum independence but intervene and supervise when necessary  - Involve family in performance of ADLs  - Assess for home care needs following discharge   - Consider OT consult to assist with ADL evaluation and planning for discharge  - Provide patient education as appropriate  Outcome: Progressing  Goal: Maintains/Returns to pre admission functional level  Description: INTERVENTIONS:  - Perform AM-PAC 6 Click Basic Mobility/ Daily Activity assessment daily.  - Set and communicate daily mobility goal to care team and patient/family/caregiver.   - Collaborate with rehabilitation services on mobility goals if consulted  - Out of bed for toileting  - Record patient progress and toleration of activity level   Outcome: Progressing     Problem: DISCHARGE PLANNING  Goal: Discharge to home or other facility with appropriate resources  Description: INTERVENTIONS:  - Identify barriers to discharge w/patient and caregiver  - Arrange for needed discharge resources and transportation as appropriate  - Identify discharge learning needs (meds, wound care, etc.)  - Arrange for interpretive services to assist at discharge as needed  - Refer to Case Management Department for coordinating discharge planning if the patient needs post-hospital services based on physician/advanced practitioner order or complex needs  related to functional status, cognitive ability, or social support system  Outcome: Progressing     Problem: Knowledge Deficit  Goal: Patient/family/caregiver demonstrates understanding of disease process, treatment plan, medications, and discharge instructions  Description: Complete learning assessment and assess knowledge base.  Interventions:  - Provide teaching at level of understanding  - Provide teaching via preferred learning methods  Outcome: Progressing

## 2024-10-29 NOTE — WOUND OSTOMY CARE
Progress Note- Ostomy  Ragini Melendez 69 y.o. female  [id number]  --01        Assessment:  Patient underwent ileal conduit on 10/24/24  Seen for ostomy education and teaching. Introduced self and role to patient. Patient is agreeable to visit. Patient is primary learner, today's education was brief, patient was fatigued today, will plan another visit for education and pouch change for tomorrow 10/30 prior to discharge. Urostomy pouch is dry, intact, with no signs of leaking. Skin surrounding nephrostomy tube sites assessed today per primary nurse request.     Ostomy:  Topics reviewed: one piece/two piece pouching systems differences, how to manage a two piece pouch.     Plan is for patient to have VNA at home.    Patient verbalized understanding of education and teaching. Patient is active learner. All questions and concerns answered. Encouraged patient to continue to read the educational materials provided.    Patient gave verbal permission to order sample kits from [employer], [employer], and [employer].      Wound:    Left nephrostomy tube site    - Right nephrostomy tube site is dry and intact with no redness. Left nephrostomy tube site skin is dry and intact with a red irregular rash with satellite lesions; tube has been leaking frequently. Will recommend Silver alginate and split gauze for treatment      Skin care plans:  1-Hydraguard to bilateral sacrum, buttock and heels BID and PRN  2-Elevate heels to offload pressure.  3-Ehob cushion in chair when out of bed.  4-Moisturize skin daily with skin nourishing cream.  5-Turn/reposition q2h for pressure re-distribution on skin.   6-Left nephrostomy tube site- Cleanse with soap and water, pat dry. Apply Mel[employer] over skin like a split gauze, then top with Split gauze and secure with minimal paper tape. Change daily and more often if needed for soilage.        Contact through [employer] Secure Chat with any questions  Wound Care will continue to follow while  inpatient    Candie SANTILLANN RN CWON  Wound and Ostomy care

## 2024-10-29 NOTE — ASSESSMENT & PLAN NOTE
Etiology: suspect secondary to hypertension, obstructive uropathy in the setting of bilateral ureteral strictures, underlying glomerular pathology as well as age-related nephron loss as well as due to sequelae of recurrent acute kidney injury  Baseline creatinine around 2.0 to 2.5 mg/dL  Renal function improved to creatinine 2.02 mg/dL.  Last 24-hour urine output decreased to 2.4 L but urine output today since a.m. was already 1.5 L.  Will continue to monitor renal function.  Stressed on oral hydration, decreased IV fluid to 50 mL/h and will monitor.  Avoid nephrotoxins

## 2024-10-29 NOTE — PROGRESS NOTES
NEPHROLOGY PROGRESS NOTE   Ragini Melendez 69 y.o. female MRN: [id number]  Unit/Bed#: MS Spear8-01 Encounter: [id number]    ASSESSMENT & PLAN:     Assessment & Plan  CKD (chronic kidney disease) stage 4, GFR 15-29 ml/min ([facility])  Etiology: suspect secondary to hypertension, obstructive uropathy in the setting of bilateral ureteral strictures, underlying glomerular pathology as well as age-related nephron loss as well as due to sequelae of recurrent acute kidney injury  Baseline creatinine around 2.0 to 2.5 mg/dL  Renal function improved to creatinine 2.02 mg/dL.  Last 24-hour urine output decreased to 2.4 L but urine output today since a.m. was already 1.5 L.  Will continue to monitor renal function.  Stressed on oral hydration, decreased IV fluid to 50 mL/h and will monitor.  Avoid nephrotoxins  Benign essential hypertension  Blood pressure stable and is at goal.  Currently not on any medications.  Avoid hypotension    Hypomagnesemia  -Magnesium level low 1.5 on 10/28.  Replaced with IV magnesium sulfate 2 g, recheck level tomorrow  Anemia due to stage 4 chronic kidney disease  (HCC)  Current hemoglobin improved to 9.1 g/dL.  Continue to monitor  Previously received IV iron supplementation with oncology  Avoid KENNEY given malignancy  Recommend anemia management per hematology oncology  Bilateral hydronephrosis  Status post ileal conduit urine diversion (no cystectomy) Bilateral ureterolysis 10/24/2024  Urology following.  Continue management per urology  Endometrial cancer (HCC)  Has been greater than 2 years off treatment.  per gyn oncology feels this is prolonged remission  GYn oncology following     Discussed with primary team that renal function is stable, slightly better decrease IV fluid to 50 mL/h and primary team agreed with the plan    SUBJECTIVE:  Patient denies any new complaints, denies any nausea vomiting    OBJECTIVE:  Current Weight: Weight - Scale: 71.7 kg (158 lb)  Vitals:    10/29/24 0713   BP: 139/81    Pulse: 76   Resp: 18   Temp: 98.2 °F (36.8 °C)   SpO2: 95%       Intake/Output Summary (Last 24 hours) at 10/29/2024 1234  Last data filed at 10/29/2024 0852  Gross per 24 hour   Intake 1983.75 ml   Output 3265 ml   Net -1281.25 ml       Physical Exam  General:  Ill looking, awake.  Eyes: Conjunctivae pink,  Sclera anicteric  ENT: lips and mucous membranes moist  Neck: supple   Chest: Clear to Auscultation both lungs,  no crackles, ronchus or wheezing.  CVS: S1 & S2 present, normal rate, regular rhythm, no murmur.  Abdomen: soft, non-tender, non-distended, Bowel sounds normoactive.  Has ileal conduit  Extremities: no edema of  legs  Skin: no rash  Neuro: awake, alert, oriented x 3   Psych: Mood and affect appropriate     Medications:    Current Facility-Administered Medications:     acetaminophen (TYLENOL) tablet 650 mg, 650 mg, Oral, Q6H CHRISTIANO, Demetrius Lowe MD, 650 mg at 10/29/24 0852    ARIPiprazole (ABILIFY) tablet 20 mg, 20 mg, Oral, QAM, Demetrius Lowe MD, 20 mg at 10/29/24 0851    calcitriol (ROCALTROL) capsule 0.25 mcg, 0.25 mcg, Oral, Once per day on Monday Wednesday Friday, Demetrius Lowe MD, 0.25 mcg at 10/28/24 0757    calcium acetate (PHOSLO) capsule 667 mg, 667 mg, Oral, TID With Meals, Demetrius Lowe MD, 667 mg at 10/29/24 1134    calcium carbonate-vitamin D 500 mg-5 mcg tablet 1 tablet, 1 tablet, Oral, Daily With Breakfast, Demetrius Lowe MD, 1 tablet at 10/29/24 0851    Cholecalciferol (VITAMIN D3) tablet 4,000 Units, 4,000 Units, Oral, Daily, Demetrius Lowe MD, 4,000 Units at 10/29/24 0852    cyanocobalamin (VITAMIN B-12) tablet 50 mcg, 50 mcg, Oral, Daily, Demetrius Lowe MD, 50 mcg at 10/29/24 0852    dextrose 5 % in lactated Ringer's infusion, 75 mL/hr, Intravenous, Continuous, Wilton Multani DO, Last Rate: 75 mL/hr at 10/28/24 1416, 75 mL/hr at 10/28/24 1416    docusate sodium (COLACE) capsule 100 mg, 100 mg, Oral, BID, Demetrius Warner  MD Mynor, 100 mg at 10/29/24 0852    fluticasone (FLONASE) 50 mcg/act nasal spray 1 spray, 1 spray, Nasal, Daily, Demetrius Lowe MD, 1 spray at 10/28/24 0800    folic acid (FOLVITE) tablet 1 mg, 1 mg, Oral, HS, Demetrius Lowe MD, 1 mg at 10/28/24 2155    heparin (porcine) subcutaneous injection 5,000 Units, 5,000 Units, Subcutaneous, Q12H CHRISTIANO, 5,000 Units at 10/29/24 0852 **AND** [COMPLETED] Platelet count, , , Once, Demetrius Lowe MD    HYDROmorphone (DILAUDID) injection 0.5 mg, 0.5 mg, Intravenous, Q1H PRN, RAFITA Simpson    HYDROmorphone (DILAUDID) tablet 2 mg, 2 mg, Oral, Q4H PRN, RAFITA Simpson, 2 mg at 10/28/24 0457    HYDROmorphone (DILAUDID) tablet 4 mg, 4 mg, Oral, Q4H PRN, RAFITA Simpson    loratadine (CLARITIN) tablet 10 mg, 10 mg, Oral, Daily, Demetrius Lowe MD, 10 mg at 10/29/24 0852    melatonin tablet 6 mg, 6 mg, Oral, HS, Mariana Vides Waqas, RAFITA, 6 mg at 10/29/24 0225    methocarbamol (ROBAXIN) tablet 750 mg, 750 mg, Oral, Q6H CHRISTIANO, RAFITA Simpson, 750 mg at 10/29/24 0852    multivitamin stress formula tablet 1 tablet, 1 tablet, Oral, QAM, Demetrius Lowe MD, 1 tablet at 10/29/24 0856    naloxone (NARCAN) injection 0.1 mg, 0.1 mg, Intravenous, Q1MIN PRN, Vaishali Amanda MD    pantoprazole (PROTONIX) EC tablet 20 mg, 20 mg, Oral, Early Morning, Demetrius Lowe MD, 20 mg at 10/29/24 0626    polyethylene glycol (MIRALAX) packet 17 g, 17 g, Oral, Daily, RAFITA Greco, 17 g at 10/29/24 0852    venlafaxine (EFFEXOR-XR) 24 hr capsule 37.5 mg, 37.5 mg, Oral, Daily, Demetrius Lowe MD, 37.5 mg at 10/29/24 0852    Invasive Devices:        Lab Results:   Results from last 7 days   Lab Units 10/29/24  0626 10/28/24  0505 10/27/24  0521 10/26/24  0501   WBC Thousand/uL 10.25* 9.57 9.35 10.33*   HEMOGLOBIN g/dL 9.1* 8.6* 7.9* 9.1*   HEMATOCRIT % 29.1* 27.6* 25.4* 28.8*   PLATELETS Thousands/uL 251 211 180 185   POTASSIUM  "mmol/L 4.5 4.5 4.4 4.4   CHLORIDE mmol/L 107 107 108 107   CO2 mmol/L 26 25 24 23   BUN mg/dL 25 27* 28* 31*   CREATININE mg/dL 2.02* 2.14* 2.05* 2.17*   CALCIUM mg/dL 8.3* 9.1 8.6 8.7   MAGNESIUM mg/dL  --  1.5*  --  1.6*   ALK PHOS U/L 76  --   --   --    ALT U/L 9  --   --   --    AST U/L 9*  --   --   --        Previous work up:         Portions of the record may have been created with voice recognition software. Occasional wrong word or \"sound a like\" substitutions may have occurred due to the inherent limitations of voice recognition software. Read the chart carefully and recognize, using context, where substitutions have occurred.If you have any questions, please contact the dictating provider.    "

## 2024-10-29 NOTE — ASSESSMENT & PLAN NOTE
Current hemoglobin improved to 9.1 g/dL.  Continue to monitor  Previously received IV iron supplementation with oncology  Avoid KENNEY given malignancy  Recommend anemia management per hematology oncology

## 2024-10-29 NOTE — PROGRESS NOTES
Patient:    MRN:  [id number]    Aidin Request ID:  [id number]    Level of care reserved:  Home Health Agency    Partner Reserved:  [facility], [facility], [place] [address] [phone]    Clinical needs requested:    Geography searched:  [phone]    Start of Service:    Request sent:  4:17pm EDT on 10/28/2024 by Latosha Genao    Partner reserved:  10:14am EDT on 10/29/2024 by Latosha Genao    Choice list shared:  10:14am EDT on 10/29/2024 by Latosha Genoa

## 2024-10-29 NOTE — PLAN OF CARE
Problem: OCCUPATIONAL THERAPY ADULT  Goal: Performs self-care activities at highest level of function for planned discharge setting.  See evaluation for individualized goals.  Description: Treatment Interventions: ADL retraining, Functional transfer training, UE strengthening/ROM, Endurance training, Patient/family training, Equipment evaluation/education, Compensatory technique education, Continued evaluation, Energy conservation, Activityengagement          See flowsheet documentation for full assessment, interventions and recommendations.   Outcome: Progressing  Note: Limitation: Decreased ADL status, Decreased endurance, Decreased self-care trans, Decreased high-level ADLs  Prognosis: Good  Assessment: Pt is a 68 y/o female that was admitted to [facility] 10/24/2024 with bilateral hydronephrosis. Pt s/p ileal conduit. Pt  has a past medical history of Anemia, Anxiety, Cancer ([facility]), Chemotherapy induced neutropenia ([facility]), Chronic kidney disease, CKD (chronic kidney disease) stage 3, GFR 30-59 ml/min ([facility]), COVID, Depression, DVT (deep venous thrombosis) ([facility]), Endometrial cancer ([facility]), GERD (gastroesophageal reflux disease), H/O gastric bypass, Hard to intubate, History of chemotherapy, History of DVT in adulthood, History of transfusion, Hyperglycemia, Hyperlipidemia, Hypertension, Iron deficiency anemia, Iron deficiency anemia secondary to inadequate dietary iron intake, OAB (overactive bladder), Port-A-Cath in place, and Wears glasses. Pt lives with mother in a two level house with 3 RAJANI, standard toilet, and walk in shower with shower chair. Pt reports using no AD at baseline. Prior to admission pt (I) ADLs, IADLs, and functional mobility. Pt currently requires MIN A to complete LB ADLs and toileting. Pt requires CGA to ambulate short household distances with rw. Pt limited by decreased ADL status, functional transfers, functional mobility, and activity tolerance. Pt supine in bed at begning of  session, pt seated in bedside chair at end of session with alarm set and items within reach. The patient's raw score on the AM-PAC Daily Activity Inpatient Short Form is 20. A raw score of greater than or equal to 19 suggests the patient may benefit from discharge to home. Please refer to the recommendation of the Occupational Therapist for safe discharge planning. Recommend Level III minimum intensity OT services  at d/c to maximize pt function.     Rehab Resource Intensity Level, OT: III (Minimum Resource Intensity)

## 2024-10-29 NOTE — PROGRESS NOTES
Progress Note - Urology   Name: Ragini Meelndez 69 y.o. female I MRN: [id number]  Unit/Bed#: -01 I Date of Admission: 10/24/2024   Date of Service: 10/29/2024 I Hospital Day: 5     Assessment & Plan  Bilateral hydronephrosis  S/p diversionary ileal conduit creation without cystectomy and bilateral ureterolysis 10/24  WBC 9.57 , hgb 8.6, creatinine 2.14 yesterday, am labs pending   SUSAN drain--250 mL / 24 hours  Ileal conduit output 3100  mL / 24 hours  Bilateral nephrostomy tubes  Afebrile, vital signs stable  Continue PT OT, walked the steps yesterday with PT.  OOB when awake   Continue to monitor for return of bowel function  MiraLAX daily, no bowel movement however positive flatus, no n/v   Strict I&O  DVT prophylaxis and incentive spirometer    Endometrial cancer (HCC)  Followed by GYN/oncology  History of radiation as well as systemic chemotherapy over 2 years ago    CKD (chronic kidney disease) stage 4, GFR 15-29 ml/min ([facility])  Lab Results   Component Value Date    EGFR 23 10/28/2024    EGFR 24 10/27/2024    EGFR 22 10/26/2024    CREATININE 2.14 (H) 10/28/2024    CREATININE 2.05 (H) 10/27/2024    CREATININE 2.17 (H) 10/26/2024   Followed by nephrology  Patient with history of renal atrophy   Continue IV fluids per nephrology recommendations (currently D5 LR at 75 cc/h)  Continue to trend labs  Benign essential hypertension  130s-140s  Not currently on medication, continue to trend vital signs  Anemia due to stage 4 chronic kidney disease  (HCC)  Hgb 8.6 yesterday, am labs pending   History of IV iron supplementation per oncology team  Hypomagnesemia  1.6 on 10.26, am level pending       Subjective   Ragini Melendez is a 69-year-old female with history of endometrial carcinoma status post ASH/BSO with findings consistent with metastatic disease.  She underwent wide-field pelvic radiation and unfortunately developed subsequent bilateral distal ureteral strictures and resultant chronic bilateral  hydronephrosis.  She was initially managed with chronic indwelling ureteral stents, but later redeveloped hydronephrosis and sepsis despite retained stents.  She later transitioned to bilateral nephrostomy tubes for maximal drainage.  CT demonstrated right renal unit atrophy.  After vaccination of risk versus benefits and potential complications, she was agreeable to proceed with exploratory laparotomy ureterolysis and ileal conduit creation for urinary diversion without cystectomy.  She is now postoperative day 5.        She is not tolerating advanced diet without nausea or vomiting.  She states she typically has trouble with bowel movements and still has not had one however is passing flatus.  She has been getting out of bed and ambulated well with physical therapy yesterday and was able to go on the steps.  She reports her pain level is a 0 at rest and roughly a 3 with movement and reported as soreness.      Objective :  Temp:  [98.2 °F (36.8 °C)-98.7 °F (37.1 °C)] 98.2 °F (36.8 °C)  HR:  [76-81] 76  BP: (117-140)/(70-80) 140/80  Resp:  [16] 16  SpO2:  [95 %-96 %] 95 %  O2 Device: None (Room air)    I/O         10/27 0701  10/28 0700 10/28 0701  10/29 0700    P.O. 1140     I.V. (mL/kg) 1888.8 (26.3) 1983.8 (27.7)    Total Intake(mL/kg) 3028.8 (42.2) 1983.8 (27.7)    Urine (mL/kg/hr) 4175 (2.4) 2400 (1.4)    Drains 340 220    Total Output 4515 2620    Net -1486.3 -636.3                Lines/Drains/Airways       Active Status       Name Placement date Placement time Site Days    Port A Cath 11/12/19 Left Chest 11/12/19  1221  Chest  1812    Closed/Suction Drain LLQ 10/24/24  1230  LLQ  4    Nephrostomy Left 10 Fr. 10/16/24  1114  Left  12    Nephrostomy Right 10.2 Fr. 10/16/24  1122  Right  12    Urostomy Ileal conduit RLQ 10/24/24  1158  RLQ  4                  Physical Exam  Vitals reviewed.   Constitutional:       General: She is not in acute distress.     Appearance: Normal appearance. She is not ill-appearing,  toxic-appearing or diaphoretic.   HENT:      Head: Normocephalic and atraumatic.   Cardiovascular:      Rate and Rhythm: Normal rate.      Heart sounds: Normal heart sounds.   Pulmonary:      Effort: Pulmonary effort is normal. No respiratory distress.      Breath sounds: Normal breath sounds.      Comments: Decreased bases  Abdominal:      General: Abdomen is flat.      Palpations: Abdomen is soft.      Tenderness: There is abdominal tenderness.      Comments: SUSAN drain with serosanguineous output   Genitourinary:     Comments: Ileal conduit  Draining yellow urine.  Bilateral nephrostomy tubes, capped    Musculoskeletal:         General: No swelling.      Cervical back: Normal range of motion.   Skin:     General: Skin is warm and dry.      Comments: Staples midline, clean dry and intact no signs or symptoms of infection   Neurological:      General: No focal deficit present.      Mental Status: She is alert and oriented to person, place, and time.   Psychiatric:         Mood and Affect: Mood normal.         Behavior: Behavior normal.         Thought Content: Thought content normal.         Judgment: Judgment normal.           Lab Results: I have reviewed the following results:  Recent Labs     10/28/24  0505   WBC 9.57   HGB 8.6*   HCT 27.6*      SODIUM 141   K 4.5      CO2 25   BUN 27*   CREATININE 2.14*   GLUC 111       Imaging Results Review: No pertinent imaging studies reviewed.  Other Study Results Review: No additional pertinent studies reviewed.    VTE Pharmacologic Prophylaxis: Heparin  VTE Mechanical Prophylaxis: sequential compression device

## 2024-10-29 NOTE — TREATMENT PLAN
Patient's labs are stable.  Creatinine trending down to 2.02; nephrology following and decrease IV fluids to 50 mL/h  Patient's now moving her bowels and tolerating diet  Patient has been cleared to discharge home with home care by physical therapy and Occupational Therapy, this is being arranged by case management.  Ileal conduit care and teaching is ongoing.  Patient has not been cleared by wound care team for discharge home to manage ileal conduit at this time.  Wound care team will update me 10/30 about potential discharge

## 2024-10-29 NOTE — OCCUPATIONAL THERAPY NOTE
Occupational Therapy Evaluation     Patient Name: Ragini Melendez  Today's Date: 10/29/2024  Problem List  Principal Problem:    Bilateral hydronephrosis  Active Problems:    Benign essential hypertension    Endometrial cancer (HCC)    Hypomagnesemia    CKD (chronic kidney disease) stage 4, GFR 15-29 ml/min (HCC)    Anemia due to stage 4 chronic kidney disease  (HCC)    Past Medical History  Past Medical History:   Diagnosis Date    Anemia     Anxiety     Cancer (HCC)     ENDOMETRIAL    Chemotherapy induced neutropenia (HCC) 08/30/2019    Chronic kidney disease     CKD (chronic kidney disease) stage 3, GFR 30-59 ml/min (HCC)     COVID     Fall 2022    Depression     DVT (deep venous thrombosis) (HCC) 07/19/2019    RIGHT LEG    Endometrial cancer (HCC) 12/2017    GERD (gastroesophageal reflux disease)     H/O gastric bypass     Hard to intubate     pt denies AS OF 7/25/23    History of chemotherapy     started 12/2021endometrial cancer- done 12/23/22    History of DVT in adulthood     RLE    History of transfusion 04/13/2023    Hyperglycemia     vx type 2 dm -- last assessed 4/1/14; resolved 11/7/17    Hyperlipidemia     Hypertension     in past- no meds at present    Iron deficiency anemia     iron infusions weekly    Iron deficiency anemia secondary to inadequate dietary iron intake 02/08/2023    OAB (overactive bladder)     Port-A-Cath in place     Wears glasses      Past Surgical History  Past Surgical History:   Procedure Laterality Date    ABDOMINAL SURGERY      GASTRIC BYPASS; 2001    BREAST BIOPSY Right 06/28/2019    core biopsy; benign    CHOLECYSTECTOMY      at the time of gastric bypass    COLONOSCOPY      CT NEEDLE BIOPSY LYMPH NODE  07/08/2019    CYSTECTOMY, RADICAL WITH ILEOCONDUIT N/A 10/24/2024    Procedure: ILEAL CONDUIT urinary diversion (No cystectomy), BILATERAL UTERO-LYSIS;  Surgeon: Demetrius Lowe MD;  [facility];  Service: Urology    FL GUIDED CENTRAL VENOUS ACCESS DEVICE  INSERTION  11/12/2019    FL RETROGRADE PYELOGRAM  03/30/2023    FL RETROGRADE PYELOGRAM  05/09/2023    FL RETROGRADE PYELOGRAM  8/1/2023    GASTRIC BYPASS      HYSTERECTOMY Bilateral 2017    total abdominal with salpingo-oophorectomy    IR NEPHROSTOMY TO NEPHROURETERAL STENT  06/09/2022    IR NEPHROSTOMY TO NEPHROURETERAL STENT  12/20/2022    IR NEPHROSTOMY TO NEPHROURETERAL STENT  8/8/2023    IR NEPHROSTOMY TUBE CHECK/CHANGE/REPOSITION/REINSERTION/UPSIZE  05/27/2022    IR NEPHROSTOMY TUBE CHECK/CHANGE/REPOSITION/REINSERTION/UPSIZE  11/10/2023    IR NEPHROSTOMY TUBE CHECK/CHANGE/REPOSITION/REINSERTION/UPSIZE  2/9/2024    IR NEPHROSTOMY TUBE CHECK/CHANGE/REPOSITION/REINSERTION/UPSIZE  4/12/2024    IR NEPHROSTOMY TUBE CHECK/CHANGE/REPOSITION/REINSERTION/UPSIZE  7/12/2024    IR NEPHROSTOMY TUBE CHECK/CHANGE/REPOSITION/REINSERTION/UPSIZE  10/16/2024    IR NEPHROSTOMY TUBE PLACEMENT  07/26/2019    IR NEPHROSTOMY TUBE PLACEMENT  05/27/2023    IR NEPHROURETERAL STENT CHECK/CHANGE/REPOSITION  04/06/2021    IR NEPHROURETERAL STENT CHECK/CHANGE/REPOSITION  06/04/2021    IR NEPHROURETERAL STENT CHECK/CHANGE/REPOSITION  09/03/2021    IR NEPHROURETERAL STENT CHECK/CHANGE/REPOSITION  12/07/2021    IR NEPHROURETERAL STENT CHECK/CHANGE/REPOSITION  03/08/2022    IR NEPHROURETERAL STENT CHECK/CHANGE/REPOSITION  05/10/2022    IR NEPHROURETERAL STENT CHECK/CHANGE/REPOSITION  09/19/2022    IR PICC LINE  09/27/2019    IR PORT PLACEMENT  07/26/2019    IR PORT PLACEMENT      IR PORT REMOVAL  09/20/2019    OOPHORECTOMY Bilateral 2017    TN CYSTO BLADDER W/URETERAL CATHETERIZATION Right 03/30/2023    Procedure: CYSTOSCOPY RETROGRADE PYELOGRAM WITH exchange of STENT URETERAL;  Surgeon: Demetrius Lowe MD;  [facility];  Service: Urology    TN CYSTO BLADDER W/URETERAL CATHETERIZATION Bilateral 05/09/2023    Procedure: CYSTOSCOPY, BILATERAL RETROGRADE PYELOGRAM, WITH LEFT INSERTION STENT URETERAL, RIGHT URTERAL STENT EXCHANGE;   Surgeon: Nicola Hannah MD;  [facility];  Service: Urology    MA CYSTO BLADDER W/URETERAL CATHETERIZATION Bilateral 8/1/2023    Procedure: CYSTOSCOPY BILATERAL RETROGRADE  WITH REMOVAL BILATERAL  STENT URETERAL;  Surgeon: Demetrius Loew MD;  [facility];  Service: Urology    MA CYSTO W/INSERT URETERAL STENT Right 03/30/2023    Procedure: EXCHANGE STENT URETERAL;  Surgeon: Demetrius Lowe MD;  [facility];  Service: Urology    MA INSJ TUNNELED CTR VAD W/SUBQ PORT AGE 5 YR/> Left 11/12/2019    Procedure: INSERTION VENOUS PORT ( PORT-A-CATH) IR;  Surgeon: Edilberto Guzman DO;  [facility];  Service: Interventional Radiology    MA LAPS ABD PRTM&OMENTUM DX W/WO SPEC BR/WA SPX N/A 12/19/2017    Procedure: LAPAROSCOPY DIAGNOSTIC;  Surgeon: Evgeny So MD;  [facility];  Service: Gynecology Oncology    MA LAPS W/RAD HYST W/BILAT LMPHADEC RMVL TUBE/OVARY N/A 12/19/2017    Procedure: HYSTERECTOMY LAPAROSCOPIC TOTAL (LTH) W/ ROBOTICS; BILATERAL SALPINGOOPHERECTOMY; LYMPH NODE DISSECTION; lysis of adhesions;  Surgeon: Evgeny So MD;  [facility];  Service: Gynecology Oncology    REMOVAL URETERAL STENT Bilateral 8/1/2023    Procedure: REMOVAL STENT URETERAL;  Surgeon: Demetrius Lowe MD;  [facility];  Service: Urology    TONSILLECTOMY      US GUIDED BREAST BIOPSY RIGHT COMPLETE Right 06/28/2019      10/29/24 0917   OT Last Visit   OT Visit Date 10/29/24   Note Type   Note type Evaluation   Pain Assessment   Pain Assessment Tool 0-10   Pain Score No Pain   Restrictions/Precautions   Weight Bearing Precautions Per Order No   Other Precautions Chair Alarm;Bed Alarm;Multiple lines;Fall Risk;Contact/isolation   Home Living   Type of Home House   Home Layout Two level;Stairs to enter with rails;Performs ADLs on one level;Able to live on main level with bedroom/bathroom  (3 RAJANI)   Bathroom Shower/Tub Walk-in shower   Bathroom  Toilet Standard   Bathroom Equipment Shower chair   Home Equipment Walker;Cane  (does not use at baseline)   Prior Function   Level of [place] Independent with ADLs;Independent with functional mobility;Independent with IADLS   Lives With   (mother)   Receives Help From Family   IADLs Independent with driving;Independent with meal prep;Independent with medication management   Falls in the last 6 months 0   Vocational Retired   Lifestyle   Autonomy Pt reports (I) with ADLs, IADLs, and functional mobility without AD. Pt +  and retired   Reciprocal Relationships family   Service to Others retired   ADL   Where Assessed Edge of bed   Eating Assistance 6  Modified independent   Grooming Assistance 6  Modified Independent   UB Bathing Assistance 5  Supervision/Setup   LB Bathing Assistance 4  Minimal Assistance   UB Dressing Assistance 5  Supervision/Setup   LB Dressing Assistance 4  Minimal Assistance   Toileting Assistance  4  Minimal Assistance   Bed Mobility   Supine to Sit 4  Minimal assistance   Additional items Increased time required;Verbal cues;LE management;Assist x 1   Sit to Supine 5  Supervision   Additional items Increased time required;Verbal cues   Transfers   Sit to Stand 5  Supervision   Additional items Increased time required;Verbal cues   Stand to Sit 5  Supervision   Additional items Increased time required;Verbal cues   Additional Comments with rw   Functional Mobility   Functional Mobility 4  Minimal assistance  (CGA)   Additional Comments Pt requires CGA to ambulate short household distances with rw   Additional items Rolling walker   Balance   Static Sitting Good   Dynamic Sitting Fair +   Static Standing Fair   Dynamic Standing Fair -   Ambulatory Fair -   Activity Tolerance   Activity Tolerance Patient tolerated treatment well   Nurse Made Aware RN Cleared   RUE Assessment   RUE Assessment WFL   LUE Assessment   LUE Assessment WFL   Hand Function   Gross Motor Coordination Functional    Fine Motor Coordination Functional   Cognition   Overall Cognitive Status WFL   Arousal/Participation Alert;Responsive;Cooperative   Attention Within functional limits   Orientation Level Oriented X4   Memory Within functional limits   Following Commands Follows all commands and directions without difficulty   Comments Pt agreeable to therapy with good safety awareness and insight to condition   Assessment   Limitation Decreased ADL status;Decreased endurance;Decreased self-care trans;Decreased high-level ADLs   Prognosis Good   Assessment Pt is a 70 y/o female that was admitted to [facility] 10/24/2024 with bilateral hydronephrosis. Pt s/p ileal conduit. Pt  has a past medical history of Anemia, Anxiety, Cancer (HCC), Chemotherapy induced neutropenia (HCC), Chronic kidney disease, CKD (chronic kidney disease) stage 3, GFR 30-59 ml/min ([facility]), COVID, Depression, DVT (deep venous thrombosis) (HCC), Endometrial cancer (HCC), GERD (gastroesophageal reflux disease), H/O gastric bypass, Hard to intubate, History of chemotherapy, History of DVT in adulthood, History of transfusion, Hyperglycemia, Hyperlipidemia, Hypertension, Iron deficiency anemia, Iron deficiency anemia secondary to inadequate dietary iron intake, OAB (overactive bladder), Port-A-Cath in place, and Wears glasses. Pt lives with mother in a two level house with 3 RAJANI, standard toilet, and walk in shower with shower chair. Pt reports using no AD at baseline. Prior to admission pt (I) ADLs, IADLs, and functional mobility. Pt currently requires MIN A to complete LB ADLs and toileting. Pt requires CGA to ambulate short household distances with rw. Pt limited by decreased ADL status, functional transfers, functional mobility, and activity tolerance. Pt supine in bed at begning of session, pt seated in bedside chair at end of session with alarm set and items within reach. The patient's raw score on the AM-PAC Daily Activity Inpatient Short Form is 20.  A raw score of greater than or equal to 19 suggests the patient may benefit from discharge to home. Please refer to the recommendation of the Occupational Therapist for safe discharge planning. Recommend Level III minimum intensity OT services  at d/c to maximize pt function.   Goals   Patient Goals to feel better   LTG Time Frame 10-14   Plan   Treatment Interventions ADL retraining;Functional transfer training;UE strengthening/ROM;Endurance training;Patient/family training;Equipment evaluation/education;Compensatory technique education;Continued evaluation;Energy conservation;Activityengagement   Goal Expiration Date 11/12/24   OT Frequency 2-3x/wk   Discharge Recommendation   Rehab Resource Intensity Level, OT III (Minimum Resource Intensity)   AM-PAC Daily Activity Inpatient   Lower Body Dressing 3   Bathing 3   Toileting 3   Upper Body Dressing 3   Grooming 4   Eating 4   Daily Activity Raw Score 20   Daily Activity Standardized Score (Calc for Raw Score >=11) 42.03   AM-PAC Applied Cognition Inpatient   Following a Speech/Presentation 4   Understanding Ordinary Conversation 4   Taking Medications 4   Remembering Where Things Are Placed or Put Away 4   Remembering List of 4-5 Errands 4   Taking Care of Complicated Tasks 4   Applied Cognition Raw Score 24   Applied Cognition Standardized Score 62.21   End of Consult   Education Provided Yes   Patient Position at End of Consult Bedside chair;All needs within reach;Bed/Chair alarm activated   Nurse Communication Nurse aware of consult       Goals:    Pt will complete functional transfers with MOD IND and appropriate AD to maximize pt safety.    Pt will complete bed mobility with MOD IND  to maximize pt safety.    Pt will complete LB ADLs with MOD IND  to maximize pt independence.    Pt will complete UB ADLs with MOD IND to maximize pt independence.    Pt will complete toileting with MOD IND to maximize pt independence.    Pt will complete functional household  distance mobility with MOD IND and appropriate AD to maximize pt safety.    Pt will complete simulated IADL tasks with MOD IND to maximize pt independence.     Pt will be able to tolerate 30 minutes of functional activity during therapy session.    Pt will follow multistep directions with increased time or repeating directions 2X to maximize pt safety.     Pt will participate in continued cognitive evaluation for safe discharge planning.      DIMITRI Alves, OTR/L

## 2024-10-29 NOTE — PLAN OF CARE
Problem: PAIN - ADULT  Goal: Verbalizes/displays adequate comfort level or baseline comfort level  Description: Interventions:  - Encourage patient to monitor pain and request assistance  - Assess pain using appropriate pain scale  - Administer analgesics based on type and severity of pain and evaluate response  - Implement non-pharmacological measures as appropriate and evaluate response  - Consider cultural and social influences on pain and pain management  - Notify physician/advanced practitioner if interventions unsuccessful or patient reports new pain  Outcome: Progressing     Problem: INFECTION - ADULT  Goal: Absence or prevention of progression during hospitalization  Description: INTERVENTIONS:  - Assess and monitor for signs and symptoms of infection  - Monitor lab/diagnostic results  - Monitor all insertion sites, i.e. indwelling lines, tubes, and drains  - Monitor endotracheal if appropriate and nasal secretions for changes in amount and color  - [place] appropriate cooling/warming therapies per order  - Administer medications as ordered  - Instruct and encourage patient and family to use good hand hygiene technique  - Identify and instruct in appropriate isolation precautions for identified infection/condition  Outcome: Progressing  Goal: Absence of fever/infection during neutropenic period  Description: INTERVENTIONS:  - Monitor WBC    Outcome: Progressing     Problem: SAFETY ADULT  Goal: Patient will remain free of falls  Description: INTERVENTIONS:  - Educate patient/family on patient safety including physical limitations  - Instruct patient to call for assistance with activity   - Consult OT/PT to assist with strengthening/mobility   - Keep Call bell within reach  - Keep bed low and locked with side rails adjusted as appropriate  - Keep care items and personal belongings within reach  - Initiate and maintain comfort rounds  - Make Fall Risk Sign visible to staff  - Offer Toileting every 2 Hours,  in advance of need  - Initiate/Maintain bed alarm  - Apply yellow socks and bracelet for high fall risk patients  - Consider moving patient to room near nurses station  Outcome: Progressing  Goal: Maintain or return to baseline ADL function  Description: INTERVENTIONS:  -  Assess patient's ability to carry out ADLs; assess patient's baseline for ADL function and identify physical deficits which impact ability to perform ADLs (bathing, care of mouth/teeth, toileting, grooming, dressing, etc.)  - Assess/evaluate cause of self-care deficits   - Assess range of motion  - Assess patient's mobility; develop plan if impaired  - Assess patient's need for assistive devices and provide as appropriate  - Encourage maximum independence but intervene and supervise when necessary  - Involve family in performance of ADLs  - Assess for home care needs following discharge   - Consider OT consult to assist with ADL evaluation and planning for discharge  - Provide patient education as appropriate  Outcome: Progressing  Goal: Maintains/Returns to pre admission functional level  Description: INTERVENTIONS:  - Perform AM-PAC 6 Click Basic Mobility/ Daily Activity assessment daily.  - Set and communicate daily mobility goal to care team and patient/family/caregiver.   - Collaborate with rehabilitation services on mobility goals if consulted  - Perform Range of Motion 3 times a day.  - Reposition patient every 2 hours.  - Dangle patient 3 times a day  - Stand patient 3 times a day  - Ambulate patient 3 times a day  - Out of bed to chair 3 times a day   - Out of bed for meals 3 times a day  - Out of bed for toileting  - Record patient progress and toleration of activity level   Outcome: Progressing     Problem: DISCHARGE PLANNING  Goal: Discharge to home or other facility with appropriate resources  Description: INTERVENTIONS:  - Identify barriers to discharge w/patient and caregiver  - Arrange for needed discharge resources and  transportation as appropriate  - Identify discharge learning needs (meds, wound care, etc.)  - Arrange for interpretive services to assist at discharge as needed  - Refer to Case Management Department for coordinating discharge planning if the patient needs post-hospital services based on physician/advanced practitioner order or complex needs related to functional status, cognitive ability, or social support system  Outcome: Progressing     Problem: Knowledge Deficit  Goal: Patient/family/caregiver demonstrates understanding of disease process, treatment plan, medications, and discharge instructions  Description: Complete learning assessment and assess knowledge base.  Interventions:  - Provide teaching at level of understanding  - Provide teaching via preferred learning methods  Outcome: Progressing

## 2024-10-29 NOTE — PLAN OF CARE
Problem: PHYSICAL THERAPY ADULT  Goal: Performs mobility at highest level of function for planned discharge setting.  See evaluation for individualized goals.  Description: Treatment/Interventions: Functional transfer training, Elevations, LE strengthening/ROM, Therapeutic exercise, Endurance training, Equipment eval/education, Bed mobility, Gait training, Patient/family training          See flowsheet documentation for full assessment, interventions and recommendations.  Outcome: Progressing  Note: Prognosis: Good  Problem List: Decreased endurance, Impaired balance, Decreased mobility  Assessment: pt making excellent progress toward goals. was able to ambualte increaesd distacnes + negotiate obstacles w/o LOB or dififuclty w/ slow careful carlos and use of RW. negotiates steps w/ CGA. pt reprots no concerns for d/c home. pt reports will have additional support from sister and brother on d/c. PT recommending home w/ home PT/ needs new RW for home and additional family support. Level 3 resources on d/c.  Barriers to Discharge: None     Rehab Resource Intensity Level, PT: III (Minimum Resource Intensity)    See flowsheet documentation for full assessment.

## 2024-10-29 NOTE — ASSESSMENT & PLAN NOTE
S/p diversionary ileal conduit creation without cystectomy and bilateral ureterolysis 10/24  WBC 9.57 , hgb 8.6, creatinine 2.14 yesterday, am labs pending   SUSAN drain--250 mL / 24 hours  Ileal conduit output 3100  mL / 24 hours  Bilateral nephrostomy tubes  Afebrile, vital signs stable  Continue PT OT, walked the steps yesterday with PT.  OOB when awake   Continue to monitor for return of bowel function  MiraLAX daily, no bowel movement however positive flatus, no n/v   Strict I&O  DVT prophylaxis and incentive spirometer

## 2024-10-30 ENCOUNTER — TELEPHONE (OUTPATIENT)
Dept: NEPHROLOGY | Facility: CLINIC | Age: 69
End: 2024-10-30

## 2024-10-30 VITALS
TEMPERATURE: 98.1 F | WEIGHT: 158 LBS | RESPIRATION RATE: 18 BRPM | HEIGHT: 57 IN | BODY MASS INDEX: 34.09 KG/M2 | DIASTOLIC BLOOD PRESSURE: 81 MMHG | SYSTOLIC BLOOD PRESSURE: 140 MMHG | HEART RATE: 73 BPM | OXYGEN SATURATION: 95 %

## 2024-10-30 DIAGNOSIS — N25.81 SECONDARY HYPERPARATHYROIDISM (HCC): ICD-10-CM

## 2024-10-30 DIAGNOSIS — N18.4 STAGE 4 CHRONIC KIDNEY DISEASE (HCC): Primary | ICD-10-CM

## 2024-10-30 DIAGNOSIS — E83.42 HYPOMAGNESEMIA: ICD-10-CM

## 2024-10-30 DIAGNOSIS — E83.39 HYPERPHOSPHATEMIA: ICD-10-CM

## 2024-10-30 LAB
ALBUMIN SERPL BCG-MCNC: 2.8 G/DL (ref 3.5–5)
ALP SERPL-CCNC: 84 U/L (ref 34–104)
ALT SERPL W P-5'-P-CCNC: 9 U/L (ref 7–52)
ANION GAP SERPL CALCULATED.3IONS-SCNC: 11 MMOL/L (ref 4–13)
AST SERPL W P-5'-P-CCNC: 10 U/L (ref 13–39)
BASOPHILS # BLD AUTO: 0.07 THOUSANDS/ΜL (ref 0–0.1)
BASOPHILS NFR BLD AUTO: 1 % (ref 0–1)
BILIRUB SERPL-MCNC: 0.35 MG/DL (ref 0.2–1)
BUN SERPL-MCNC: 25 MG/DL (ref 5–25)
CALCIUM ALBUM COR SERPL-MCNC: 9.4 MG/DL (ref 8.3–10.1)
CALCIUM SERPL-MCNC: 8.4 MG/DL (ref 8.4–10.2)
CHLORIDE SERPL-SCNC: 103 MMOL/L (ref 96–108)
CO2 SERPL-SCNC: 25 MMOL/L (ref 21–32)
CREAT SERPL-MCNC: 2.08 MG/DL (ref 0.6–1.3)
DME PARACHUTE DELIVERY DATE REQUESTED: NORMAL
DME PARACHUTE ITEM DESCRIPTION: NORMAL
DME PARACHUTE ORDER STATUS: NORMAL
DME PARACHUTE SUPPLIER NAME: NORMAL
DME PARACHUTE SUPPLIER PHONE: NORMAL
EOSINOPHIL # BLD AUTO: 0.14 THOUSAND/ΜL (ref 0–0.61)
EOSINOPHIL NFR BLD AUTO: 2 % (ref 0–6)
ERYTHROCYTE [DISTWIDTH] IN BLOOD BY AUTOMATED COUNT: 13.1 % (ref 11.6–15.1)
GFR SERPL CREATININE-BSD FRML MDRD: 24 ML/MIN/1.73SQ M
GLUCOSE SERPL-MCNC: 103 MG/DL (ref 65–140)
HCT VFR BLD AUTO: 29.3 % (ref 34.8–46.1)
HGB BLD-MCNC: 9 G/DL (ref 11.5–15.4)
IMM GRANULOCYTES # BLD AUTO: 0.2 THOUSAND/UL (ref 0–0.2)
IMM GRANULOCYTES NFR BLD AUTO: 2 % (ref 0–2)
LYMPHOCYTES # BLD AUTO: 0.89 THOUSANDS/ΜL (ref 0.6–4.47)
LYMPHOCYTES NFR BLD AUTO: 10 % (ref 14–44)
MAGNESIUM SERPL-MCNC: 1.9 MG/DL (ref 1.9–2.7)
MCH RBC QN AUTO: 29 PG (ref 26.8–34.3)
MCHC RBC AUTO-ENTMCNC: 30.7 G/DL (ref 31.4–37.4)
MCV RBC AUTO: 95 FL (ref 82–98)
MONOCYTES # BLD AUTO: 0.77 THOUSAND/ΜL (ref 0.17–1.22)
MONOCYTES NFR BLD AUTO: 8 % (ref 4–12)
NEUTROPHILS # BLD AUTO: 7.26 THOUSANDS/ΜL (ref 1.85–7.62)
NEUTS SEG NFR BLD AUTO: 77 % (ref 43–75)
NRBC BLD AUTO-RTO: 0 /100 WBCS
PLATELET # BLD AUTO: 279 THOUSANDS/UL (ref 149–390)
PMV BLD AUTO: 10.2 FL (ref 8.9–12.7)
POTASSIUM SERPL-SCNC: 4.2 MMOL/L (ref 3.5–5.3)
PROT SERPL-MCNC: 5.7 G/DL (ref 6.4–8.4)
RBC # BLD AUTO: 3.1 MILLION/UL (ref 3.81–5.12)
SODIUM SERPL-SCNC: 139 MMOL/L (ref 135–147)
WBC # BLD AUTO: 9.33 THOUSAND/UL (ref 4.31–10.16)

## 2024-10-30 PROCEDURE — NC001 PR NO CHARGE: Performed by: NURSE PRACTITIONER

## 2024-10-30 PROCEDURE — 83735 ASSAY OF MAGNESIUM: CPT | Performed by: INTERNAL MEDICINE

## 2024-10-30 PROCEDURE — 80053 COMPREHEN METABOLIC PANEL: CPT | Performed by: NURSE PRACTITIONER

## 2024-10-30 PROCEDURE — 99232 SBSQ HOSP IP/OBS MODERATE 35: CPT | Performed by: INTERNAL MEDICINE

## 2024-10-30 PROCEDURE — 99024 POSTOP FOLLOW-UP VISIT: CPT | Performed by: NURSE PRACTITIONER

## 2024-10-30 PROCEDURE — 85025 COMPLETE CBC W/AUTO DIFF WBC: CPT | Performed by: NURSE PRACTITIONER

## 2024-10-30 RX ORDER — DOCUSATE SODIUM 100 MG/1
100 CAPSULE, LIQUID FILLED ORAL 2 TIMES DAILY
Qty: 180 CAPSULE | Refills: 0 | Status: SHIPPED | OUTPATIENT
Start: 2024-10-30 | End: 2025-01-28

## 2024-10-30 RX ORDER — POLYETHYLENE GLYCOL 3350 17 G/17G
17 POWDER, FOR SOLUTION ORAL DAILY
Qty: 119 G | Refills: 0 | Status: SHIPPED | OUTPATIENT
Start: 2024-10-31 | End: 2024-11-07

## 2024-10-30 RX ORDER — METHOCARBAMOL 750 MG/1
750 TABLET, FILM COATED ORAL EVERY 6 HOURS SCHEDULED
Qty: 40 TABLET | Refills: 0 | Status: SHIPPED | OUTPATIENT
Start: 2024-10-30 | End: 2024-11-09

## 2024-10-30 RX ORDER — HYDROMORPHONE HYDROCHLORIDE 2 MG/1
2 TABLET ORAL EVERY 4 HOURS PRN
Qty: 10 TABLET | Refills: 0 | Status: SHIPPED | OUTPATIENT
Start: 2024-10-30 | End: 2024-11-09

## 2024-10-30 RX ORDER — ENOXAPARIN SODIUM 100 MG/ML
1 INJECTION SUBCUTANEOUS EVERY 24 HOURS
Qty: 24 ML | Refills: 0 | Status: SHIPPED | OUTPATIENT
Start: 2024-10-30 | End: 2024-11-29

## 2024-10-30 RX ORDER — ACETAMINOPHEN 325 MG/1
650 TABLET ORAL EVERY 6 HOURS PRN
Start: 2024-10-30

## 2024-10-30 RX ADMIN — ACETAMINOPHEN 650 MG: 325 TABLET, FILM COATED ORAL at 13:59

## 2024-10-30 RX ADMIN — LORATADINE 10 MG: 10 TABLET ORAL at 08:30

## 2024-10-30 RX ADMIN — PANTOPRAZOLE SODIUM 20 MG: 20 TABLET, DELAYED RELEASE ORAL at 05:08

## 2024-10-30 RX ADMIN — VITAM B12 50 MCG: 100 TAB at 08:31

## 2024-10-30 RX ADMIN — METHOCARBAMOL 750 MG: 750 TABLET ORAL at 08:30

## 2024-10-30 RX ADMIN — CALCITRIOL CAPSULES 0.25 MCG 0.25 MCG: 0.25 CAPSULE ORAL at 08:29

## 2024-10-30 RX ADMIN — METHOCARBAMOL 750 MG: 750 TABLET ORAL at 14:00

## 2024-10-30 RX ADMIN — B-COMPLEX W/ C & FOLIC ACID TAB 1 TABLET: TAB at 08:29

## 2024-10-30 RX ADMIN — CALCIUM ACETATE 667 MG: 667 CAPSULE ORAL at 14:00

## 2024-10-30 RX ADMIN — HEPARIN SODIUM 5000 UNITS: 5000 INJECTION INTRAVENOUS; SUBCUTANEOUS at 08:31

## 2024-10-30 RX ADMIN — DOCUSATE SODIUM 100 MG: 100 CAPSULE, LIQUID FILLED ORAL at 08:31

## 2024-10-30 RX ADMIN — VENLAFAXINE HYDROCHLORIDE 37.5 MG: 37.5 CAPSULE, EXTENDED RELEASE ORAL at 08:29

## 2024-10-30 RX ADMIN — Medication 4000 UNITS: at 08:30

## 2024-10-30 RX ADMIN — CALCIUM ACETATE 667 MG: 667 CAPSULE ORAL at 08:31

## 2024-10-30 RX ADMIN — Medication 1 TABLET: at 08:39

## 2024-10-30 RX ADMIN — ACETAMINOPHEN 650 MG: 325 TABLET, FILM COATED ORAL at 08:30

## 2024-10-30 RX ADMIN — ARIPIPRAZOLE 20 MG: 10 TABLET ORAL at 08:30

## 2024-10-30 NOTE — ASSESSMENT & PLAN NOTE
Lab Results   Component Value Date    EGFR 24 10/29/2024    EGFR 23 10/28/2024    EGFR 24 10/27/2024    CREATININE 2.02 (H) 10/29/2024    CREATININE 2.14 (H) 10/28/2024    CREATININE 2.05 (H) 10/27/2024   Followed by nephrology  Patient with history of renal atrophy   Continue IV fluids per nephrology recommendations (currently D5 LR at 75 cc/h)  Continue to trend labs

## 2024-10-30 NOTE — PROGRESS NOTES
Progress Note - Urology   Name: Ragini Melendez 69 y.o. female I MRN: [id number]  Unit/Bed#: -01 I Date of Admission: 10/24/2024   Date of Service: 10/30/2024 I Hospital Day: 6     Assessment & Plan  Bilateral hydronephrosis  S/p diversionary ileal conduit creation without cystectomy and bilateral ureterolysis 10/24  WBC 10 , hgb 9.1, creatinine trending down 2.1 to 2.0 yesterday, am labs pending  SUSAN drain--90 mL / 24 hours.  Will remove prior to discharge   Ileal conduit output 2850  mL / 24 hours  Bilateral nephrostomy tubes.  Right cdi, left red rash around tube, wound care saw and recommend silver alginate and dressing   Afebrile, vital signs stable  Continue PT OT, to be arranged prior to discharge   Continue to monitor for return of bowel function  Moving bowels  Strict I&O  DVT prophylaxis and incentive spirometer    Endometrial cancer ([facility])  Followed by GYN/oncology  History of radiation as well as systemic chemotherapy over 2 years ago    CKD (chronic kidney disease) stage 4, GFR 15-29 ml/min ([facility])  Lab Results   Component Value Date    EGFR 24 10/29/2024    EGFR 23 10/28/2024    EGFR 24 10/27/2024    CREATININE 2.02 (H) 10/29/2024    CREATININE 2.14 (H) 10/28/2024    CREATININE 2.05 (H) 10/27/2024   Followed by nephrology  Patient with history of renal atrophy   Continue IV fluids per nephrology recommendations (currently D5 LR at 75 cc/h)  Continue to trend labs  Benign essential hypertension  130s  Not currently on medication, continue to trend vital signs  Anemia due to stage 4 chronic kidney disease  (HCC)  Hgb 8.6--9.1--today pending   History of IV iron supplementation per oncology team      Subjective : Ragini Melendez is a 69-year-old female who is postop day 6 status post diversionary ileal conduit creation without cystectomy and bilateral ureterolysis.  Patient reports feeling well this morning.  Patient moved her bowels yesterday and is not having any nausea or vomiting and tolerating  diet well.  No issues with ileal conduit overnight.  SUSAN draining serosanguineous fluid.    Objective :  Temp:  [98.1 °F (36.7 °C)-98.5 °F (36.9 °C)] 98.1 °F (36.7 °C)  HR:  [71-76] 73  BP: (136-140)/(75-81) 136/75  Resp:  [16-18] 18  SpO2:  [95 %-97 %] 95 %  O2 Device: None (Room air)    Physical Exam  Vitals reviewed.   Constitutional:       Appearance: She is obese.   HENT:      Head: Normocephalic and atraumatic.   Cardiovascular:      Rate and Rhythm: Normal rate.   Pulmonary:      Effort: Pulmonary effort is normal.   Abdominal:      General: Bowel sounds are normal.      Tenderness: There is abdominal tenderness (expected post op).   Genitourinary:     Comments: Ileal conduit draining clear yellow urine, 2 stents in place  Musculoskeletal:         General: Normal range of motion.   Skin:     General: Skin is warm and dry.      Comments: Midline staples cdi    Neurological:      Mental Status: She is alert and oriented to person, place, and time.   Psychiatric:         Mood and Affect: Mood normal.         Behavior: Behavior normal.         Thought Content: Thought content normal.         Judgment: Judgment normal.         Lab Results: I have reviewed the following results:    Imaging Results Review: No pertinent imaging studies reviewed.  Other Study Results Review: No additional pertinent studies reviewed.    VTE Pharmacologic Prophylaxis: Heparin  VTE Mechanical Prophylaxis: sequential compression device

## 2024-10-30 NOTE — ASSESSMENT & PLAN NOTE
S/p diversionary ileal conduit creation without cystectomy and bilateral ureterolysis 10/24  WBC 10 , hgb 9.1, creatinine trending down 2.1 to 2.0 yesterday, am labs pending  SUSAN drain--90 mL / 24 hours.  Will remove prior to discharge   Ileal conduit output 2850  mL / 24 hours  Bilateral nephrostomy tubes.  Right cdi, left red rash around tube, wound care saw and recommend silver alginate and dressing   Afebrile, vital signs stable  Continue PT OT, to be arranged prior to discharge   Continue to monitor for return of bowel function  Moving bowels  Strict I&O  DVT prophylaxis and incentive spirometer

## 2024-10-30 NOTE — ASSESSMENT & PLAN NOTE
Etiology: suspect secondary to hypertension, obstructive uropathy in the setting of bilateral ureteral strictures, underlying glomerular pathology as well as age-related nephron loss as well as due to sequelae of recurrent acute kidney injury  Baseline creatinine around 2.0 to 2.5 mg/dL  Renal function is stable at creatinine 2.08 mg/dL with stable electrolytes.  Renal output currently acceptable at 2.8 L in last 24 hours, will stop further IV fluids and monitor.  Okay for discharge from nephrology side.  Repeat BMP in 1 week as outpatient.  Informed outpatient nephrologist Dr. Villalobos  Continue to avoid nephrotoxins

## 2024-10-30 NOTE — ASSESSMENT & PLAN NOTE
Current hemoglobin stable at 9.0 g/dL  Previously received IV iron supplementation with oncology  Avoid KENNEY given malignancy  Recommend anemia management per hematology oncology

## 2024-10-30 NOTE — DISCHARGE INSTR - AVS FIRST PAGE
Eat a low fiber low residue diet.  You do not want to have increased gas which can cause you discomfort.  Eat small frequent meals.  Stay hydrated at home  Make sure you continue to move your bowels.  Take the bowel regimen prescribed to you and hold it for any loose stools or diarrhea  Please call our office with any concerns or questions

## 2024-10-30 NOTE — PROGRESS NOTES
NEPHROLOGY PROGRESS NOTE   Ragini Melendez 69 y.o. female MRN: [id number]  Unit/Bed#: MS Spear8-01 Encounter: [id number]    ASSESSMENT & PLAN:     Assessment & Plan  CKD (chronic kidney disease) stage 4, GFR 15-29 ml/min ([facility])  Etiology: suspect secondary to hypertension, obstructive uropathy in the setting of bilateral ureteral strictures, underlying glomerular pathology as well as age-related nephron loss as well as due to sequelae of recurrent acute kidney injury  Baseline creatinine around 2.0 to 2.5 mg/dL  Renal function is stable at creatinine 2.08 mg/dL with stable electrolytes.  Renal output currently acceptable at 2.8 L in last 24 hours, will stop further IV fluids and monitor.  Okay for discharge from nephrology side.  Repeat BMP in 1 week as outpatient.  Informed outpatient nephrologist Dr. Villalobos  Continue to avoid nephrotoxins  Benign essential hypertension  Blood pressure stable and is at goal.  Currently not on any medications.  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  Continue management per urology  Endometrial cancer (HCC)  Has been greater than 2 years off treatment.  per gyn oncology feels this is prolonged remission  GYn oncology following     Discussed with primary team that renal function is stable at creatinine 2.08 mg/dL and okay for discharge from nephrology side and primary team agreed with the plan    SUBJECTIVE:  Patient tolerating oral intake, no new complaints    OBJECTIVE:  Current Weight: Weight - Scale: 71.7 kg (158 lb)  Vitals:    10/30/24 0729   BP: 140/81   Pulse:    Resp:    Temp: 98.1 °F (36.7 °C)   SpO2:        Intake/Output Summary  (Last 24 hours) at 10/30/2024 1235  Last data filed at 10/30/2024 0851  Gross per 24 hour   Intake 118 ml   Output 1465 ml   Net -1347 ml       Physical Exam  General:  Ill looking, awake.  Eyes: Conjunctivae pink,  Sclera anicteric  ENT: lips and mucous membranes moist  Neck: supple   Chest: Clear to Auscultation both lungs,  no crackles, ronchus or wheezing.  CVS: S1 & S2 present, normal rate, regular rhythm, no murmur.  Abdomen: soft, non-tender, non-distended, Bowel sounds normoactive.  Has ileal conduit  Extremities: no edema of  legs  Skin: no rash  Neuro: awake, alert, oriented  Psych: Mood and affect appropriate      Medications:    Current Facility-Administered Medications:     acetaminophen (TYLENOL) tablet 650 mg, 650 mg, Oral, Q6H CHRISTIANO, Demetrius Lowe MD, 650 mg at 10/30/24 0830    ARIPiprazole (ABILIFY) tablet 20 mg, 20 mg, Oral, QAM, Demetrius Lowe MD, 20 mg at 10/30/24 0830    calcitriol (ROCALTROL) capsule 0.25 mcg, 0.25 mcg, Oral, Once per day on Monday Wednesday Friday, Demetrius Lowe MD, 0.25 mcg at 10/30/24 0829    calcium acetate (PHOSLO) capsule 667 mg, 667 mg, Oral, TID With Meals, Demetrius Lowe MD, 667 mg at 10/30/24 0831    calcium carbonate-vitamin D 500 mg-5 mcg tablet 1 tablet, 1 tablet, Oral, Daily With Breakfast, Demetrius Lowe MD, 1 tablet at 10/30/24 0839    Cholecalciferol (VITAMIN D3) tablet 4,000 Units, 4,000 Units, Oral, Daily, Demetrius Lowe MD, 4,000 Units at 10/30/24 0830    cyanocobalamin (VITAMIN B-12) tablet 50 mcg, 50 mcg, Oral, Daily, Demetrius Lowe MD, 50 mcg at 10/30/24 0831    dextrose 5 % in lactated Ringer's infusion, 50 mL/hr, Intravenous, Continuous, Manuel Wallace MD, Last Rate: 50 mL/hr at 10/29/24 1435, 50 mL/hr at 10/29/24 1435    docusate sodium (COLACE) capsule 100 mg, 100 mg, Oral, BID, Demetrius Lowe MD, 100 mg at 10/30/24 0831    fluticasone (FLONASE) 50 mcg/act nasal spray 1 spray, 1 spray,  Nasal, Daily, Demetrius Lowe MD, 1 spray at 10/28/24 0800    folic acid (FOLVITE) tablet 1 mg, 1 mg, Oral, HS, Demetrius Lowe MD, 1 mg at 10/29/24 2108    heparin (porcine) subcutaneous injection 5,000 Units, 5,000 Units, Subcutaneous, Q12H CHRISTIANO, 5,000 Units at 10/30/24 0831 **AND** [COMPLETED] Platelet count, , , Once, Demetrius Lowe MD    HYDROmorphone (DILAUDID) injection 0.5 mg, 0.5 mg, Intravenous, Q1H PRN, RAFITA Simpson    HYDROmorphone (DILAUDID) tablet 2 mg, 2 mg, Oral, Q4H PRN, RAFITA Simpson, 2 mg at 10/28/24 0457    HYDROmorphone (DILAUDID) tablet 4 mg, 4 mg, Oral, Q4H PRN, RAFITA Simpson    loratadine (CLARITIN) tablet 10 mg, 10 mg, Oral, Daily, Demetrius Lowe MD, 10 mg at 10/30/24 0830    melatonin tablet 6 mg, 6 mg, Oral, HS, Mariana Alan, RAFITA, 6 mg at 10/29/24 0225    methocarbamol (ROBAXIN) tablet 750 mg, 750 mg, Oral, Q6H CHRISTIANO, RAFITA Simpson, 750 mg at 10/30/24 0830    multivitamin stress formula tablet 1 tablet, 1 tablet, Oral, QAM, Demetrius Lowe MD, 1 tablet at 10/30/24 0829    naloxone (NARCAN) injection 0.1 mg, 0.1 mg, Intravenous, Q1MIN PRN, Vaishali Amanda MD    pantoprazole (PROTONIX) EC tablet 20 mg, 20 mg, Oral, Early Morning, Demetrius Lowe MD, 20 mg at 10/30/24 0508    polyethylene glycol (MIRALAX) packet 17 g, 17 g, Oral, Daily, RAFITA Greco, 17 g at 10/29/24 0852    venlafaxine (EFFEXOR-XR) 24 hr capsule 37.5 mg, 37.5 mg, Oral, Daily, Demetrius Lowe MD, 37.5 mg at 10/30/24 0829    Invasive Devices:        Lab Results:   Results from last 7 days   Lab Units 10/30/24  0512 10/29/24  0626 10/28/24  0505 10/27/24  0521 10/26/24  0501   WBC Thousand/uL 9.33 10.25* 9.57   < > 10.33*   HEMOGLOBIN g/dL 9.0* 9.1* 8.6*   < > 9.1*   HEMATOCRIT % 29.3* 29.1* 27.6*   < > 28.8*   PLATELETS Thousands/uL 279 251 211   < > 185   POTASSIUM mmol/L 4.2 4.5 4.5   < > 4.4   CHLORIDE mmol/L 103 107 107   < > 107  "  CO2 mmol/L 25 26 25   < > 23   BUN mg/dL 25 25 27*   < > 31*   CREATININE mg/dL 2.08* 2.02* 2.14*   < > 2.17*   CALCIUM mg/dL 8.4 8.3* 9.1   < > 8.7   MAGNESIUM mg/dL 1.9  --  1.5*  --  1.6*   ALK PHOS U/L 84 76  --   --   --    ALT U/L 9 9  --   --   --    AST U/L 10* 9*  --   --   --     < > = values in this interval not displayed.       Previous work up:         Portions of the record may have been created with voice recognition software. Occasional wrong word or \"sound a like\" substitutions may have occurred due to the inherent limitations of voice recognition software. Read the chart carefully and recognize, using context, where substitutions have occurred.If you have any questions, please contact the dictating provider.    "

## 2024-10-30 NOTE — WOUND OSTOMY CARE
Progress Note- Ostomy  Ragini Melendez 69 y.o. female  [id number]  --01        Assessment:  Patient seen today for ostomy teaching along with her sister. Patient is primary learner and sister present for support, also filmed teaching on phone for reference.  Instructed on surgery and how stoma was created, healthy characteristics of a stoma, and when to notify the physician.  Instructed on daily care of ostomy pouch--emptying when 1/3 full of urine, cleaning, showering with pouch on/off, changing pouch every 3-4 days or if leaking.  Demonstrated pouch emptying and hooking to bedside drainage bag.  Demonstrated pouching system change using one piece cut-to-fit pouch--removal of pouch with push/pull method, cleansing with warm water only, measuring stoma, cutting pouch barrier to appropriate size, application of skin prep, application of pouch, holding gentle pressure on pouch after application for best adherence.  Patient followed along with teaching, all questions answered.  Instructed on how to obtain ostomy supplies through visiting nurses and about outpatient ostomy clinic should patient have pouching problems after hospitalization.  Ostomy supplies at bedside.  Informed patient of  support/sample programs, patient agreeable to enroll into supply program.    Pouch Change Steps:  1. Peel back pouch using push-pull method, may use non-alcohol adhesive remover. Remove pouch from top to bottom.   2. Use warm water only to cleanse skin around the stoma (alise-stomal skin).   3. Make sure all adhesive residue is removed and skin is dry and not oily.  4. Measure stoma size using measuring guide and trace correct measurements onto back of pouch.  5. Then cut or mold the backing of pouch out to correct shape/size.  6. Apply skin prep to peristomal skin  7. Place pouch over stoma and onto skin.  8. Use warmth of hand to apply gentle pressure to help backing of pouch to adhere well to  skin.        Stoma:     Red, well budded stoma measuring 1 1/4 inch, peristomal skin is intact, stents in place.             Tori SANTILLANN, RN, CWOCN

## 2024-10-30 NOTE — DISCHARGE SUMMARY
Discharge Summary - Ragini Melendez 69 y.o. female MRN: [id number]    Unit/Bed#: -01 Encounter: [id number]    Admission Date: 10/24/2024     Discharge Date: 10/30/24    HPI: Ragini Melendez is a 69 y.o. female who presented for ileal conduit creation by Dr. Lowe.      Procedure(s):  ILEAL CONDUIT urinary diversion (No cystectomy), BILATERAL UTERO-LYSIS  Surgeon(s):  MD Judson Delgado MD Frank Jeremy Tamarkin, MD  10/24/2024    Hospital Course: Standard    Discharge Diagnosis: Bilateral hydronephrosis    Condition at Discharge: good    Discharge Medications:  See after visit summary for reconciled discharge medications provided to patient and family.  Patient was discharged home on home medications with the addition of MiraLAX, Colace, Dilaudid, Lovenox, robaxin     Discharge instructions/Information to patient and family:   See after visit summary for written and verbal information which has been provided to patient and family.      Provisions for Follow-Up Care:  See after visit summary for information related to follow-up care and any pertinent home health orders.      Disposition: Home    Planned Readmission: No    Discharge Statement   I spent 20 minutes discharging the patient. This time was spent on the day of discharge. I had direct contact with the patient on the day of discharge. Additional documentation is required if more than 30 minutes were spent on discharge.     Signature:   RAFITA Greco  Date: 10/30/2024 Time: 1:22 PM

## 2024-10-30 NOTE — QUICK NOTE
Patient tolerating diet.  Requested diet advancement.  Patient having bowel movements.  Surgical incision clean dry intact well-approximated with staples noted.  No erythema or drainage.  Bilateral nephrostomy tubes with intermittent leakage which has been ongoing per patient report.  Outpatient arrangements for VNA and walker have been arranged by case management.  Patient will be discharged home today if cleared by wound care team.  Patient will go home with 30 days of Lovenox.  Patient reports prior administration of Lovenox at home.  SUSAN drain output low, SUSAN creatinine within normal limits.  SUSAN drain removed intact.  Medications were sent to pharmacy on file.  Awaiting clearance from wound care team for discharge home today

## 2024-10-30 NOTE — CASE MANAGEMENT
Case Management Discharge Planning Note    Patient name Ragini Melendez  Location /-01 MRN [id number]  : 1955 Date 10/30/2024       Current Admission Date: 10/24/2024  Current Admission Diagnosis:Bilateral hydronephrosis   Patient Active Problem List    Diagnosis Date Noted Date Diagnosed    Bilateral hydronephrosis 10/24/2024     Anemia due to stage 4 chronic kidney disease  (HCC) 10/24/2024     Hypertensive CKD (chronic kidney disease) 2024     Thrombocytopenia, unspecified ([facility]) 2024     Overweight with body mass index (BMI) of 28 to 28.9 in adult 2023     Ureteral stricture 2023     Left-sided chest wall pain 2023     Preop cardiovascular exam 2023     CKD (chronic kidney disease) stage 4, GFR 15-29 ml/min ([facility])      Other proteinuria      History of DVT (deep vein thrombosis) 2023     Hemorrhagic cystitis w/ pyelonephritis 2023     Abnormal iron saturation 2023     Dry mouth 2023     Nephrotoxicity 10/03/2022     Vaginal atrophy 2022     Depression, recurrent ([facility]) 2022     Chronic UTI 2021     Secondary malignant neoplasm of other specified sites ([facility]) 2021     Closed nondisplaced fracture of right pubis with delayed healing 2021     Need for follow-up by  2021     Dental disorder 2021     Osteoporosis 2020     Pathological fracture due to osteoporosis 2020     Bilateral piriformis syndrome      Chronic pain syndrome      Myofascial pain syndrome      Nephrostomy status (HCC) 10/14/2020     Overactive bladder 2020     Acute left-sided low back pain without sciatica 2020     Obstruction of right ureter 2020     Severe protein-calorie malnutrition (HCC) 10/03/2019     Bilateral lower extremity edema 2019     Acute on chronic kidney disease 2019     Anemia 2019     Chemotherapy induced neutropenia (HCC) 2019     Encounter for  central line care 07/16/2019     Encounter for follow-up surveillance of endometrial cancer 03/17/2019     Benign essential hypertension 12/19/2017     Endometrial cancer (HCC) 11/29/2017     Dyslipidemia 04/01/2014     Major depression, chronic 10/07/2013       LOS (days): 6  Geometric Mean LOS (GMLOS) (days): 1.8  Days to GMLOS:-4.3     OBJECTIVE:  Risk of Unplanned Readmission Score: 21.58         Current admission status: Inpatient   Preferred Pharmacy:   [id number] - [place] - [address]  [address]  [place] [address]  Phone: [phone] Fax: [phone]    [facility] [place] - [address]  [address]  [place] [address]  Phone: [phone] Fax: [phone]    Primary Care Provider: Emely Yost DO    Primary Insurance: GREGOR SUTTON  Secondary Insurance:     DISCHARGE DETAILS:  Requested Home Health Care         Is the patient interested in HHC at discharge?: Yes  Home Health Discipline requested:: Occupational Therapy, Physical Therapy, Nursing  Home Health Agency Name:: [facility]  HHA External Referral Reason (only applicable if external HHA name selected): Patient has established relationship with provider  Home Health Follow-Up Provider:: PCP  Home Health Services Needed:: Evaluate Functional Status and Safety, Restore Joint Function Post Joint Replacement, Strengthening/Theraputic Exercises to Improve Function, Wound/Ostomy Care  Homebound Criteria Met:: Uses an Assist Device (i.e. cane, walker, etc)  Supporting Clincal Findings:: Limited Endurance  Other Referral/Resources/Interventions Provided:  Interventions: HHC  Treatment Team Recommendation: Home with Home Health Care  Discharge Destination Plan:: Home with Home Health Care      Walker to be ordered within 24 hours of discharge.

## 2024-10-30 NOTE — ASSESSMENT & PLAN NOTE
Hgb 8.6--9.1--today pending   Anemia exacerbated by ileal conduit creation and exploratory laparotomy  Continue to trend labs  History of IV iron supplementation per oncology team  Follow-up with oncology outpatient

## 2024-10-30 NOTE — CASE MANAGEMENT
Case Management Discharge Planning Note    Patient name Ragini Melendez  Location /-01 MRN [id number]  : 1955 Date 10/30/2024       Current Admission Date: 10/24/2024  Current Admission Diagnosis:Bilateral hydronephrosis   Patient Active Problem List    Diagnosis Date Noted Date Diagnosed    Bilateral hydronephrosis 10/24/2024     Anemia due to stage 4 chronic kidney disease  (HCC) 10/24/2024     Hypertensive CKD (chronic kidney disease) 2024     Thrombocytopenia, unspecified ([facility]) 2024     Overweight with body mass index (BMI) of 28 to 28.9 in adult 2023     Ureteral stricture 2023     Left-sided chest wall pain 2023     Preop cardiovascular exam 2023     CKD (chronic kidney disease) stage 4, GFR 15-29 ml/min ([facility])      Other proteinuria      History of DVT (deep vein thrombosis) 2023     Hemorrhagic cystitis w/ pyelonephritis 2023     Abnormal iron saturation 2023     Dry mouth 2023     Nephrotoxicity 10/03/2022     Vaginal atrophy 2022     Depression, recurrent ([facility]) 2022     Chronic UTI 2021     Secondary malignant neoplasm of other specified sites ([facility]) 2021     Closed nondisplaced fracture of right pubis with delayed healing 2021     Need for follow-up by  2021     Dental disorder 2021     Osteoporosis 2020     Pathological fracture due to osteoporosis 2020     Bilateral piriformis syndrome      Chronic pain syndrome      Myofascial pain syndrome      Nephrostomy status (HCC) 10/14/2020     Overactive bladder 2020     Acute left-sided low back pain without sciatica 2020     Obstruction of right ureter 2020     Severe protein-calorie malnutrition (HCC) 10/03/2019     Bilateral lower extremity edema 2019     Acute on chronic kidney disease 2019     Anemia 2019     Chemotherapy induced neutropenia (HCC) 2019     Encounter for  central line care 07/16/2019     Encounter for follow-up surveillance of endometrial cancer 03/17/2019     Benign essential hypertension 12/19/2017     Endometrial cancer (HCC) 11/29/2017     Dyslipidemia 04/01/2014     Major depression, chronic 10/07/2013       LOS (days): 6  Geometric Mean LOS (GMLOS) (days): 1.8  Days to GMLOS:-4.6     OBJECTIVE:  Risk of Unplanned Readmission Score: 22.98         Current admission status: Inpatient   Preferred Pharmacy:   [id number] - [place] - [address]  [address]  [place] [address]  Phone: [phone] Fax: [phone]    [facility] [place] - [address]  [address]  [place] [address]  Phone: [phone] Fax: [phone]    Primary Care Provider: Emely Yost DO    Primary Insurance: GREGOR SUTTON  Secondary Insurance:     DISCHARGE DETAILS:    Discharge planning discussed with:: patient at bedside  Freedom of Choice: Yes   DME Referral Provided  Referral made for DME?: Yes  DME referral completed for the following items:: [place]  DME Supplier Name:: [employer]    Other Referral/Resources/Interventions Provided:  Referral Comments: [place] deliverd to patient', [employer] paperwork signed and placed in bin.   IMM Given (Date):: 10/30/24  IMM Given to:: Patient..IMM reviewed with patient, patient agrees with discharge determination.   Brother to  patient to transport home. ..CM reviewed d/c planning process including the following: identifying help at home, patient preference for d/c planning needs, Discharge Lounge, [facility] program, availability of treatment team to discuss questions or concerns patient and/or family may have regarding understanding medications and recognizing signs and symptoms once discharged.  CM also encouraged patient to follow up with all recommended appointments after discharge. Patient advised of importance for patient and family to participate  in managing patient’s medical well being.

## 2024-10-30 NOTE — PLAN OF CARE
Problem: PAIN - ADULT  Goal: Verbalizes/displays adequate comfort level or baseline comfort level  Description: Interventions:  - Encourage patient to monitor pain and request assistance  - Assess pain using appropriate pain scale  - Administer analgesics based on type and severity of pain and evaluate response  - Implement non-pharmacological measures as appropriate and evaluate response  - Consider cultural and social influences on pain and pain management  - Notify physician/advanced practitioner if interventions unsuccessful or patient reports new pain  Outcome: Progressing     Problem: INFECTION - ADULT  Goal: Absence or prevention of progression during hospitalization  Description: INTERVENTIONS:  - Assess and monitor for signs and symptoms of infection  - Monitor lab/diagnostic results  - Monitor all insertion sites, i.e. indwelling lines, tubes, and drains  - Monitor endotracheal if appropriate and nasal secretions for changes in amount and color  - [place] appropriate cooling/warming therapies per order  - Administer medications as ordered  - Instruct and encourage patient and family to use good hand hygiene technique  - Identify and instruct in appropriate isolation precautions for identified infection/condition  Outcome: Progressing  Goal: Absence of fever/infection during neutropenic period  Description: INTERVENTIONS:  - Monitor WBC    Outcome: Progressing     Problem: SAFETY ADULT  Goal: Patient will remain free of falls  Description: INTERVENTIONS:  - Educate patient/family on patient safety including physical limitations  - Instruct patient to call for assistance with activity   - Consult OT/PT to assist with strengthening/mobility   - Keep Call bell within reach  - Keep bed low and locked with side rails adjusted as appropriate  - Keep care items and personal belongings within reach  - Initiate and maintain comfort rounds  - Make Fall Risk Sign visible to staff  - Apply yellow socks and bracelet  for high fall risk patients  - Consider moving patient to room near nurses station  Outcome: Progressing  Goal: Maintain or return to baseline ADL function  Description: INTERVENTIONS:  -  Assess patient's ability to carry out ADLs; assess patient's baseline for ADL function and identify physical deficits which impact ability to perform ADLs (bathing, care of mouth/teeth, toileting, grooming, dressing, etc.)  - Assess/evaluate cause of self-care deficits   - Assess range of motion  - Assess patient's mobility; develop plan if impaired  - Assess patient's need for assistive devices and provide as appropriate  - Encourage maximum independence but intervene and supervise when necessary  - Involve family in performance of ADLs  - Assess for home care needs following discharge   - Consider OT consult to assist with ADL evaluation and planning for discharge  - Provide patient education as appropriate  Outcome: Progressing  Goal: Maintains/Returns to pre admission functional level  Description: INTERVENTIONS:  - Perform AM-PAC 6 Click Basic Mobility/ Daily Activity assessment daily.  - Set and communicate daily mobility goal to care team and patient/family/caregiver.   - Collaborate with rehabilitation services on mobility goals if consulted  - Perform Range of Motion 3 times a day.  - Reposition patient every 2 hours.  - Dangle patient 3 times a day  - Stand patient 3 times a day  - Ambulate patient 3 times a day  - Out of bed to chair 3 times a day   - Out of bed for meals 3 times a day  - Out of bed for toileting  - Record patient progress and toleration of activity level   Outcome: Progressing     Problem: DISCHARGE PLANNING  Goal: Discharge to home or other facility with appropriate resources  Description: INTERVENTIONS:  - Identify barriers to discharge w/patient and caregiver  - Arrange for needed discharge resources and transportation as appropriate  - Identify discharge learning needs (meds, wound care, etc.)  -  Arrange for interpretive services to assist at discharge as needed  - Refer to Case Management Department for coordinating discharge planning if the patient needs post-hospital services based on physician/advanced practitioner order or complex needs related to functional status, cognitive ability, or social support system  Outcome: Progressing     Problem: Knowledge Deficit  Goal: Patient/family/caregiver demonstrates understanding of disease process, treatment plan, medications, and discharge instructions  Description: Complete learning assessment and assess knowledge base.  Interventions:  - Provide teaching at level of understanding  - Provide teaching via preferred learning methods  Outcome: Progressing

## 2024-10-30 NOTE — PROGRESS NOTES
10/30/24 1500   Clinical Encounter Type   Visited With Patient   [facility] Encounters   [facility] Needs Prayer     Father Sreedhar visited patient and gave blessing/prayer.

## 2024-10-30 NOTE — TELEPHONE ENCOUNTER
----- Message from Manuel Wallace MD sent at 10/30/2024 12:38 PM EDT -----  Patient has chronic kidney disease stage IV, underwent ileal conduit with urine diversion with bilateral ureterolysis on October 24 for finding of bilateral hydronephrosis.  Renal function stable at creatinine 2.0 at the time of discharge.    -> MA team please order for repeat BMP to be in 1 week for  Dr Villalobos for chronic kidney disease stage IV.  If renal function stable, patient can continue to follow-up at routine appointment.  I will defer to Dr. Villalobos

## 2024-10-31 ENCOUNTER — TELEPHONE (OUTPATIENT)
Dept: UROLOGY | Facility: AMBULATORY SURGERY CENTER | Age: 69
End: 2024-10-31

## 2024-10-31 ENCOUNTER — PATIENT OUTREACH (OUTPATIENT)
Dept: CASE MANAGEMENT | Facility: OTHER | Age: 69
End: 2024-10-31

## 2024-10-31 ENCOUNTER — HOME CARE VISIT (OUTPATIENT)
Dept: HOME HEALTH SERVICES | Facility: HOME HEALTHCARE | Age: 69
End: 2024-10-31

## 2024-10-31 DIAGNOSIS — Z71.89 COMPLEX CARE COORDINATION: Primary | ICD-10-CM

## 2024-10-31 NOTE — TELEPHONE ENCOUNTER
Post Op Note    Ragini Melendez is a 69 y.o. female s/p ILEAL CONDUIT urinary diversion (No cystectomy). BILATERAL UTERO-LYSIS performed 10/24/2024.  Ragini Melendez is a patient of Dr. Lowe and is seen at the [facility].     How would you rate your pain on a scale from 1 to 10, 10 being the worst pain ever? PRN  Have you had a fever? No  Have your bowel movements been regular? Yes  If the patient has an incision- do you have any redness around the incision or any drainage from the incision? No  If the patient has a drain- are you having any issues with the drain? No  Do you have any other questions or concerns that I can address at this time? Nephrostomy tube is leaking. Informed to call IR.     Called and spoke with patient. She is doing well after her procedure. She only has complaints of the nephrostomy bag leaking. Informed to reach our to IR dept for them to check the bags. She had IR's number and will reach out to them. Confirmed follow up appt, no further questions at this time.

## 2024-10-31 NOTE — UTILIZATION REVIEW
NOTIFICATION OF ADMISSION DISCHARGE   This is a Notification of Discharge from [facility]. Please be advised that this patient has been discharge from our facility. Below you will find the admission and discharge date and time including the patient’s disposition.   UTILIZATION REVIEW CONTACT:  Daniela Spain  Utilization   Network Utilization Review Department  Phone: [phone] x carefully listen to the prompts. All voicemails are confidential.  Email: [email or web address]     ADMISSION INFORMATION  PRESENTATION DATE: 10/24/2024  5:39 AM  OBERVATION ADMISSION DATE: N/A  INPATIENT ADMISSION DATE: 10/24/24  7:23 AM   DISCHARGE DATE: 10/30/2024  6:18 PM   DISPOSITION:Home with Home Health Care    Network Utilization Review Department  ATTENTION: Please call with any questions or concerns to [phone] and carefully listen to the prompts so that you are directed to the right person. All voicemails are confidential.   For Discharge needs, contact Care Management DC Support Team at [phone] opt. 2  Send all requests for admission clinical reviews, approved or denied determinations and any other requests to dedicated fax number below belonging to the campus where the patient is receiving treatment. List of dedicated fax numbers for the Facilities:  FACILITY NAME UR FAX NUMBER   ADMISSION DENIALS (Administrative/Medical Necessity) [phone]   DISCHARGE SUPPORT TEAM ([facility]) [phone]   PARENT CHILD HEALTH (Maternity/NICU/Pediatrics) [phone]   [facility] [phone]   [facility] [phone]   [facility] [phone]   [facility] [phone]   [facility] [phone]   [facility] [phone]   [facility] [phone]   [facility]  [place] [phone]   [facility] [phone]   [facility] [phone]   [facility] [phone]   [facility] [phone]

## 2024-10-31 NOTE — PROGRESS NOTES
"Received referral via in basket message as covering RN Care Manager. Chart reviewed.  Pt was admitted 10/24/24 - 10/30/24,bilateral hydronephrosis.  Past medical history of Endometrial Ca with radiation therapy, HTN.  10/24/24 Ileal conduit with urinary diversion.  Referred to [facility]. Per documentation, initial home visit to begin 11/1 or 11/2.  Telephone call to patient, introduced self and role of RN Care Manager. She states she \"just got off the phone with urology, urostomy bag leaking.   Was advised by urology to contact IR.  Pt would like to hang up and call IR as directed.  She does agree to RN Care Manager contacting her again next week.   Will route note to [facility] Coordinator as ES.  Unable to review medications at this time.  Provided telephone number for OPCM RN Jessica.  "

## 2024-11-01 ENCOUNTER — HOME CARE VISIT (OUTPATIENT)
Dept: HOME HEALTH SERVICES | Facility: HOME HEALTHCARE | Age: 69
End: 2024-11-01
Payer: COMMERCIAL

## 2024-11-01 ENCOUNTER — TRANSITIONAL CARE MANAGEMENT (OUTPATIENT)
Dept: FAMILY MEDICINE CLINIC | Facility: CLINIC | Age: 69
End: 2024-11-01

## 2024-11-01 PROCEDURE — 400013 VN SOC

## 2024-11-01 PROCEDURE — 88342 IMHCHEM/IMCYTCHM 1ST ANTB: CPT | Performed by: STUDENT IN AN ORGANIZED HEALTH CARE EDUCATION/TRAINING PROGRAM

## 2024-11-01 PROCEDURE — G0299 HHS/HOSPICE OF RN EA 15 MIN: HCPCS

## 2024-11-01 PROCEDURE — 88305 TISSUE EXAM BY PATHOLOGIST: CPT | Performed by: STUDENT IN AN ORGANIZED HEALTH CARE EDUCATION/TRAINING PROGRAM

## 2024-11-01 PROCEDURE — 88313 SPECIAL STAINS GROUP 2: CPT | Performed by: STUDENT IN AN ORGANIZED HEALTH CARE EDUCATION/TRAINING PROGRAM

## 2024-11-01 PROCEDURE — 88341 IMHCHEM/IMCYTCHM EA ADD ANTB: CPT | Performed by: STUDENT IN AN ORGANIZED HEALTH CARE EDUCATION/TRAINING PROGRAM

## 2024-11-01 NOTE — TELEPHONE ENCOUNTER
Per secure chat from Dr. Lowe, see if [facility] can come out and uncap R NT and place bag on it.     Called [facility] and LM for them if they can come out and see the patient and uncap the R NT and place a bag on it.    Spoke with patient to inform that we are trying to have [facility] come to reconnect the bag. Confirmed patient does have a bag that can be used to reconnect the site.

## 2024-11-04 ENCOUNTER — HOME CARE VISIT (OUTPATIENT)
Dept: HOME HEALTH SERVICES | Facility: HOME HEALTHCARE | Age: 69
End: 2024-11-04
Payer: COMMERCIAL

## 2024-11-04 PROCEDURE — G0151 HHCP-SERV OF PT,EA 15 MIN: HCPCS

## 2024-11-04 NOTE — TELEPHONE ENCOUNTER
Patient's PT, Tash, called in stating she visited the patient this morning and patient is complaining of lower abdominal pain, 6/10 pain. Denies any fevers, vomiting or nausea. Denies constipation. Patient has been taking tylenol and robaxin. She was prescribed hydromorphone but does not want to take it. She is requesting an alternative pain medication and would like it to be sent to [facility] # [id number]. Please advise.

## 2024-11-05 ENCOUNTER — HOME CARE VISIT (OUTPATIENT)
Dept: HOME HEALTH SERVICES | Facility: HOME HEALTHCARE | Age: 69
End: 2024-11-05
Payer: COMMERCIAL

## 2024-11-05 ENCOUNTER — PATIENT OUTREACH (OUTPATIENT)
Dept: CASE MANAGEMENT | Facility: HOSPITAL | Age: 69
End: 2024-11-05

## 2024-11-05 ENCOUNTER — TELEPHONE (OUTPATIENT)
Dept: UROLOGY | Facility: AMBULATORY SURGERY CENTER | Age: 69
End: 2024-11-05

## 2024-11-05 VITALS — SYSTOLIC BLOOD PRESSURE: 124 MMHG | DIASTOLIC BLOOD PRESSURE: 76 MMHG | TEMPERATURE: 97.3 F | RESPIRATION RATE: 18 BRPM

## 2024-11-05 DIAGNOSIS — N13.39 OTHER HYDRONEPHROSIS: Primary | ICD-10-CM

## 2024-11-05 DIAGNOSIS — N13.5 URETERAL STRICTURE: Primary | ICD-10-CM

## 2024-11-05 PROCEDURE — G0180 MD CERTIFICATION HHA PATIENT: HCPCS | Performed by: UROLOGY

## 2024-11-05 PROCEDURE — G0299 HHS/HOSPICE OF RN EA 15 MIN: HCPCS

## 2024-11-05 NOTE — TELEPHONE ENCOUNTER
Left Nephrostomy is leaking from insertion site. It is currently capped, VNA is asking if they should put a bag on? Please advise Nurse will only be with patient for 30 more minutes.    Pain level 2 out of 10    Patient would like a pain medication sent to [facility] pharmacy on filed. Dilaudid is too strong     [facility] [phone]

## 2024-11-05 NOTE — TELEPHONE ENCOUNTER
Called and spoke with patient. She was advised to take Tylenol 650 mg every 6 hours as needed for mild pain. Voicemail was left for patients home care nurse to please return call to the office- plan is to uncap patients nephrostomy tube and attach to drainage bag due to leakage. Patient states she does have drainage bags at home from IR. Home Care is currently scheduled for next visit on 11/8.

## 2024-11-05 NOTE — PROGRESS NOTES
Chart reviewed. This RNCM called patient and introduced self and explained the role of the RNCM and CCM services. Patient says she is doing pretty good. She says she is still having abdominal pain about the same as yesterday. She says it is a 6/10. She continues to take Tylenol with a little relief. She says she has the Hydromorphone that was ordered but it is too strong and she is requesting an alternate medications. She says she called the office yesterday but has not heard back. I told her I would send a message as well. Patient states that the right nephrostomy tube was connected to a bag and she has no more leaking. She says that the left nephrostomy has some leaking but not as much as she had from the right. She says she is not worried about it and will show the nurse when she comes out today. She denies any fever or chills. Patient says right tube is draining clear yellow urine. We reviewed patients medications. She says she has all medications and takes them as directed. She says she does not need any refills at this time. While we were on the phone the visiting nurse came and she had to get off the phone.    Patient is agreeable to CCM services and follow up.    An IB message was sent to Urology regarding alternate pain medication as noted above.    RNCM to follow.

## 2024-11-05 NOTE — TELEPHONE ENCOUNTER
[facility] nurse called back, I relayed the following:  plan is to uncap patients nephrostomy tube and attach to drainage bag due to leakage.   She verbalized understanding and has no questions or concerns at this time.

## 2024-11-06 ENCOUNTER — HOSPITAL ENCOUNTER (OUTPATIENT)
Dept: RADIOLOGY | Facility: HOSPITAL | Age: 69
Discharge: HOME | End: 2024-11-06
Attending: RADIOLOGY
Payer: COMMERCIAL

## 2024-11-06 ENCOUNTER — TELEPHONE (OUTPATIENT)
Dept: UROLOGY | Facility: AMBULATORY SURGERY CENTER | Age: 69
End: 2024-11-06

## 2024-11-06 DIAGNOSIS — N13.5 URETERAL STRICTURE: ICD-10-CM

## 2024-11-06 DIAGNOSIS — N13.5 BILATERAL URETERAL OBSTRUCTION: Primary | ICD-10-CM

## 2024-11-06 PROCEDURE — 50431 NJX PX NFROSGRM &/URTRGRM: CPT | Performed by: RADIOLOGY

## 2024-11-06 PROCEDURE — 50431 NJX PX NFROSGRM &/URTRGRM: CPT

## 2024-11-06 RX ORDER — OXYCODONE AND ACETAMINOPHEN 5; 325 MG/1; MG/1
1 TABLET ORAL EVERY 4 HOURS PRN
Qty: 10 TABLET | Refills: 0 | Status: SHIPPED | OUTPATIENT
Start: 2024-11-06

## 2024-11-06 RX ORDER — LIDOCAINE WITH 8.4% SOD BICARB 0.9%(10ML)
SYRINGE (ML) INJECTION AS NEEDED
Status: COMPLETED | OUTPATIENT
Start: 2024-11-06 | End: 2024-11-06

## 2024-11-06 RX ADMIN — Medication 5 ML: at 11:30

## 2024-11-06 NOTE — DISCHARGE INSTRUCTIONS
"     TUBE CARE INSTRUCTIONS    Care after your procedure:    Resume your normal diet. Small sips of flat soda will help with nausea.    1. The properly functioning catheter should be forward flushed once (1x) daily with 10ml of normal saline using clean technique. You will be given a prescription for flushes.   To flush the tube, clean both connections with alcohol swab.Twist off the drainage bag/ bulb  tubing and twist the saline syringe into the drainage tube and flush. Remove the syringe and twist the drainage bag / bulb tubing tubing back on.    2. The drainage bag/bulb may be emptied as necessary. Keep a record of the amount of fluid you drain from your tube. This should be done with clean technique as well.     3. A fresh dressing should be applied daily over the tube insertion site.     4. As the tube is secured to the skin with only a suture,try not to pull on your tube. Tub baths are not permitted. Showers are permitted if the patient's skin entry site is prevented from getting wet. Similarly, washcloth \"baths\" are acceptable.     Contact Interventional Radiology at [phone] ([place] PATIENTS: Contact Interventional Radiology at [phone]) ([place] PATIENTS: Contact Interventional Radiology at [phone]) if:    1. Leakage or large amounts of liquid around the catheter.    2. Fever of 101 degrees lasting several hours without other obvious cause (such as sore throat, flu, etc).    3. Persistent nausea or vomiting.    4. Diminished drainage, which may be associated with pressure or pain. Or when the     drainage from your tube is less than 10mls for 48 hours.    5. Catheter pulled back or falls out.      The following pharmacies carry the flush syringes.       [facility]                     [facility] [address]                     [address] [place] [address]                         [phone]  Jayna Jiménez " PA  Phone [phone]            Phone [phone]                                                                                                       Demond Fischer   [facility]             [facility]                                [phone]  [address] [place] [address] [place]  Phone [phone]            Phone [phone]                      [facility]                                                                                                          [phone]  [facility]  [address]  [place]                                                                               [facility]  Phone [phone]

## 2024-11-06 NOTE — TELEPHONE ENCOUNTER
Please call Ragini and let her know that I sent in a different pain medication called Percocet, she can use this instead of the dilaudid. Thanks.

## 2024-11-06 NOTE — TELEPHONE ENCOUNTER
----- Message from Jessica CHACON sent at 11/5/2024 12:11 PM EST -----  Regarding: PAIN MEDS  Hi! I just spoke with patient. Patient is having pain and is taking Tylenol which isn't quite enough. She has Dilaudid ordered but it is too strong for her. Is there an alternative pain medication you can order? Please advise! Thank you! Jessica

## 2024-11-06 NOTE — SEDATION DOCUMENTATION
Tube check completed by Dr Moody. Pt tolerated without issues. Per Dr Moody's assessment, tubes in place. Education provided to pt, questions answered as offered. L tube resutured, new dry dressing placed to site.

## 2024-11-07 ENCOUNTER — TELEPHONE (OUTPATIENT)
Dept: UROLOGY | Facility: AMBULATORY SURGERY CENTER | Age: 69
End: 2024-11-07

## 2024-11-07 NOTE — TELEPHONE ENCOUNTER
Per Dr. Lowe, patient to come in early next week for removal of stents and staples. Confirmed with patient 11/12 appt at the [facility].

## 2024-11-07 NOTE — TELEPHONE ENCOUNTER
Called Ragini and notified her that  Percocet was sent in place of dilaudid to [facility] in [place] - she understands

## 2024-11-08 ENCOUNTER — HOME CARE VISIT (OUTPATIENT)
Dept: HOME HEALTH SERVICES | Facility: HOME HEALTHCARE | Age: 69
End: 2024-11-08
Payer: COMMERCIAL

## 2024-11-08 ENCOUNTER — APPOINTMENT (OUTPATIENT)
Dept: LAB | Facility: CLINIC | Age: 69
End: 2024-11-08
Payer: COMMERCIAL

## 2024-11-08 VITALS — RESPIRATION RATE: 16 BRPM | SYSTOLIC BLOOD PRESSURE: 132 MMHG | TEMPERATURE: 97.2 F | DIASTOLIC BLOOD PRESSURE: 82 MMHG

## 2024-11-08 DIAGNOSIS — E83.39 HYPERPHOSPHATEMIA: ICD-10-CM

## 2024-11-08 DIAGNOSIS — N25.81 SECONDARY HYPERPARATHYROIDISM (HCC): ICD-10-CM

## 2024-11-08 DIAGNOSIS — E83.42 HYPOMAGNESEMIA: ICD-10-CM

## 2024-11-08 DIAGNOSIS — N18.4 STAGE 4 CHRONIC KIDNEY DISEASE (HCC): ICD-10-CM

## 2024-11-08 LAB
ANION GAP SERPL CALCULATED.3IONS-SCNC: 14 MMOL/L (ref 4–13)
BUN SERPL-MCNC: 33 MG/DL (ref 5–25)
CALCIUM SERPL-MCNC: 8.7 MG/DL (ref 8.4–10.2)
CHLORIDE SERPL-SCNC: 103 MMOL/L (ref 96–108)
CO2 SERPL-SCNC: 20 MMOL/L (ref 21–32)
CREAT SERPL-MCNC: 1.91 MG/DL (ref 0.6–1.3)
GFR SERPL CREATININE-BSD FRML MDRD: 26 ML/MIN/1.73SQ M
GLUCOSE P FAST SERPL-MCNC: 89 MG/DL (ref 65–99)
POTASSIUM SERPL-SCNC: 4 MMOL/L (ref 3.5–5.3)
SODIUM SERPL-SCNC: 137 MMOL/L (ref 135–147)

## 2024-11-08 PROCEDURE — G0299 HHS/HOSPICE OF RN EA 15 MIN: HCPCS

## 2024-11-08 PROCEDURE — 80048 BASIC METABOLIC PNL TOTAL CA: CPT

## 2024-11-08 PROCEDURE — 36415 COLL VENOUS BLD VENIPUNCTURE: CPT

## 2024-11-10 DIAGNOSIS — N13.5 URETERAL STRICTURE: ICD-10-CM

## 2024-11-10 RX ORDER — SODIUM CHLORIDE 9 MG/ML
10 INJECTION, SOLUTION INTRAMUSCULAR; INTRAVENOUS; SUBCUTANEOUS DAILY
Qty: 300 ML | Refills: 3 | Status: SHIPPED | OUTPATIENT
Start: 2024-11-10 | End: 2025-03-10

## 2024-11-11 ENCOUNTER — VBI (OUTPATIENT)
Dept: ADMINISTRATIVE | Facility: OTHER | Age: 69
End: 2024-11-11

## 2024-11-11 ENCOUNTER — HOME CARE VISIT (OUTPATIENT)
Dept: HOME HEALTH SERVICES | Facility: HOME HEALTHCARE | Age: 69
End: 2024-11-11
Payer: COMMERCIAL

## 2024-11-11 DIAGNOSIS — N18.4 STAGE 4 CHRONIC KIDNEY DISEASE (HCC): Primary | ICD-10-CM

## 2024-11-11 PROCEDURE — G0151 HHCP-SERV OF PT,EA 15 MIN: HCPCS

## 2024-11-11 NOTE — TELEPHONE ENCOUNTER
11/11/24 6:48 PM     Chart reviewed for Hemoglobin A1c ; nothing is submitted to the patient's insurance at this time.     Brenda Marshall   [facility]

## 2024-11-12 ENCOUNTER — HOME CARE VISIT (OUTPATIENT)
Dept: HOME HEALTH SERVICES | Facility: HOME HEALTHCARE | Age: 69
End: 2024-11-12
Payer: COMMERCIAL

## 2024-11-12 ENCOUNTER — OFFICE VISIT (OUTPATIENT)
Dept: UROLOGY | Facility: CLINIC | Age: 69
End: 2024-11-12

## 2024-11-12 VITALS
OXYGEN SATURATION: 94 % | WEIGHT: 152 LBS | HEIGHT: 57 IN | BODY MASS INDEX: 32.79 KG/M2 | HEART RATE: 74 BPM | SYSTOLIC BLOOD PRESSURE: 134 MMHG | DIASTOLIC BLOOD PRESSURE: 80 MMHG

## 2024-11-12 DIAGNOSIS — N13.5 BILATERAL URETERAL OBSTRUCTION: Primary | ICD-10-CM

## 2024-11-12 PROCEDURE — 99024 POSTOP FOLLOW-UP VISIT: CPT | Performed by: UROLOGY

## 2024-11-12 NOTE — PROGRESS NOTES
Ragini is a 69-year-old female with a history of gynecological malignancy.  Many years ago she underwent ASH/BSO followed by radiation.  This was for endometrial carcinoma.  Her initial disease states back to 2017.  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  She had previously been managed with ureteral stents.  She ultimately failed stents and was managed with bilateral nephrostomy tubes.  Her right kidney appears atrophic.  She recently underwent exploratory laparotomy with creation of an ileal conduit.  The left ureter was clearly identified and transected at the level of the iliac artery where viable healthy ureter was noted.  The left ureter was easily brought beneath the sigmoid mesentery into the right lower quadrant.  The right ureter was extremely difficult to identify.  The retroperitoneum and the external iliac artery were skeletonized on the right side.  I ultimately identified a small atretic tubular structure which was most consistent with the right ureter.  This was reimplanted into the ileal conduit.  She recently went to interventional radiology and had antegrade nephrostogram's.  There does not appear to be antegrade contrast passage in either ureter.  Both nephrostomy tubes were reopened to gravity.  She states that the right puts out between 50 and 200 cc/day.  The left side is producing a large volume of urine.    On examination her stoma is pink and viable.  A single J stent is visualized coming from the stoma.  A 1 piece bag is in place.  Incision is clean dry and intact.  Staples are removed    Impression: History of endometrial cancer, status post ASH/BSO followed by radiation, history of distal radiation strictures, status post bilateral nephrostomy tubes, status post ileal conduit creation    Plan: I had a miguel angel discussion with the patient informing her that it is possible that her right ureter was not reimplanted into the conduit.  We discussed that the  structures in the right retroperitoneum which were skeletonized along the psoas muscle and iliac artery were not consistent with a standard appearing healthy ureter.  For now both nephrostomy tubes remain open to gravity.  I recommend that you return to interventional radiology for bilateral antegrade nephrostogram's.  I will ask the interventional radiology to convert her left nephrostomy tube to a single-J nephroureteral stent that exits the ileal conduit.  I will also ask interventional radiology to attempt to internalize the right nephrostomy tube which may in fact transit down to her native bladder.  I was unable to remove her single-J stent today as she has a one-piece bag and did not bring a second bag.  She will return for nursing visit in the next day or 2 for stent removal.  The patient is amenable with this plan.

## 2024-11-12 NOTE — LETTER
November 12, 2024     Emely Yost DO  [address] [facility] [address]    Patient: Ragini Melendez   YOB: 1955   Date of Visit: 11/12/2024       Dear Dr. Yost:    Thank you for referring Ragini Melendez to me for evaluation. Below are my notes for this consultation.    If you have questions, please do not hesitate to call me. I look forward to following your patient along with you.         Sincerely,        Demetrius Lowe MD        CC: MD Beth Duque MD Frank Jeremy Tamarkin, MD  11/12/2024  3:36 PM  Sign when Signing Visit  Ragini is a 69-year-old female with a history of gynecological malignancy.  Many years ago she underwent ASH/BSO followed by radiation.  This was for endometrial carcinoma.  Her initial disease states back to 2017.  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  She had previously been managed with ureteral stents.  She ultimately failed stents and was managed with bilateral nephrostomy tubes.  Her right kidney appears atrophic.  She recently underwent exploratory laparotomy with creation of an ileal conduit.  The left ureter was clearly identified and transected at the level of the iliac artery where viable healthy ureter was noted.  The left ureter was easily brought beneath the sigmoid mesentery into the right lower quadrant.  The right ureter was extremely difficult to identify.  The retroperitoneum and the external iliac artery were skeletonized on the right side.  I ultimately identified a small atretic tubular structure which was most consistent with the right ureter.  This was reimplanted into the ileal conduit.  She recently went to interventional radiology and had antegrade nephrostogram's.  There does not appear to be antegrade contrast passage in either ureter.  Both nephrostomy tubes were reopened to gravity.  She states that the right puts out between 50 and 200 cc/day.  The left side is producing a  large volume of urine.    On examination her stoma is pink and viable.  A single J stent is visualized coming from the stoma.  A 1 piece bag is in place.  Incision is clean dry and intact.  Staples are removed    Impression: History of endometrial cancer, status post ASH/BSO followed by radiation, history of distal radiation strictures, status post bilateral nephrostomy tubes, status post ileal conduit creation    Plan: I had a miguel angel discussion with the patient informing her that it is possible that her right ureter was not reimplanted into the conduit.  We discussed that the structures in the right retroperitoneum which were skeletonized along the psoas muscle and iliac artery were not consistent with a standard appearing healthy ureter.  For now both nephrostomy tubes remain open to gravity.  I recommend that you return to interventional radiology for bilateral antegrade nephrostogram's.  I will ask the interventional radiology to convert her left nephrostomy tube to a single-J nephroureteral stent that exits the ileal conduit.  I will also ask interventional radiology to attempt to internalize the right nephrostomy tube which may in fact transit down to her native bladder.  I was unable to remove her single-J stent today as she has a one-piece bag and did not bring a second bag.  She will return for nursing visit in the next day or 2 for stent removal.  The patient is amenable with this plan.

## 2024-11-13 ENCOUNTER — PREP FOR PROCEDURE (OUTPATIENT)
Dept: INTERVENTIONAL RADIOLOGY/VASCULAR | Facility: CLINIC | Age: 69
End: 2024-11-13

## 2024-11-13 DIAGNOSIS — N13.5 BILATERAL URETERAL OBSTRUCTION: Primary | ICD-10-CM

## 2024-11-13 DIAGNOSIS — Z91.09 ENVIRONMENTAL ALLERGIES: ICD-10-CM

## 2024-11-13 RX ORDER — CETIRIZINE HYDROCHLORIDE 10 MG/1
10 TABLET ORAL DAILY
Qty: 90 TABLET | Refills: 1 | Status: SHIPPED | OUTPATIENT
Start: 2024-11-13

## 2024-11-13 NOTE — TELEPHONE ENCOUNTER
Reason for call:   [x] Refill   [] Prior Auth  [] Other:     Office:   [x] PCP/Provider - : Emely Yost DO   [] Specialty/Provider -     Medication:     cetirizine (ZyrTEC) 10 mg tablet       Dose/Frequency:     Take 1 tablet (10 mg total) by mouth daily       Quantity: 90    Pharmacy: [id number] [address] [facility] [phone]     Does the patient have enough for 3 days?   [] Yes   [x] No - Send as HP to POD

## 2024-11-15 ENCOUNTER — HOME CARE VISIT (OUTPATIENT)
Dept: HOME HEALTH SERVICES | Facility: HOME HEALTHCARE | Age: 69
End: 2024-11-15
Payer: COMMERCIAL

## 2024-11-15 VITALS
RESPIRATION RATE: 16 BRPM | DIASTOLIC BLOOD PRESSURE: 80 MMHG | TEMPERATURE: 98 F | SYSTOLIC BLOOD PRESSURE: 132 MMHG | OXYGEN SATURATION: 99 % | HEART RATE: 63 BPM

## 2024-11-15 PROCEDURE — G0299 HHS/HOSPICE OF RN EA 15 MIN: HCPCS

## 2024-11-15 NOTE — TELEPHONE ENCOUNTER
notes reviewed. staples removed on 11/12 visit  stent came out 11/13  she has a 1 piece urostomy bag, glad to hear urine output is good    agree with cancel the 11/19 appt    next step- IR converting nephrostomies to nephroureterostomies  f/u with FT pending that conversion, to be determined

## 2024-11-15 NOTE — TELEPHONE ENCOUNTER
VIKKI Apple called stating the patient accidentally removed the stent on Wednesday, 11/13/2024. Patient is currently urinating and denies any pain at this time. I cancelled the nurse appointment on 11/19/2024. Patient will call back to schedule a follow up in the office.

## 2024-11-18 ENCOUNTER — HOME CARE VISIT (OUTPATIENT)
Dept: HOME HEALTH SERVICES | Facility: HOME HEALTHCARE | Age: 69
End: 2024-11-18
Payer: COMMERCIAL

## 2024-11-18 PROCEDURE — G0151 HHCP-SERV OF PT,EA 15 MIN: HCPCS

## 2024-11-19 ENCOUNTER — PATIENT OUTREACH (OUTPATIENT)
Dept: CASE MANAGEMENT | Facility: HOSPITAL | Age: 69
End: 2024-11-19

## 2024-11-19 LAB
DME PARACHUTE DELIVERY DATE ACTUAL: NORMAL
DME PARACHUTE DELIVERY DATE REQUESTED: NORMAL
DME PARACHUTE ITEM DESCRIPTION: NORMAL
DME PARACHUTE ORDER STATUS: NORMAL
DME PARACHUTE SUPPLIER NAME: NORMAL
DME PARACHUTE SUPPLIER PHONE: NORMAL

## 2024-11-19 NOTE — PROGRESS NOTES
Chart reviewed. Patient continues with services through [facility]. This RNCM attempted to call patient. There was no answer and a message was left with a request for a call back.     RNCM to follow.

## 2024-11-20 ENCOUNTER — HOME CARE VISIT (OUTPATIENT)
Dept: HOME HEALTH SERVICES | Facility: HOME HEALTHCARE | Age: 69
End: 2024-11-20
Payer: COMMERCIAL

## 2024-11-20 PROCEDURE — G0299 HHS/HOSPICE OF RN EA 15 MIN: HCPCS

## 2024-11-21 VITALS — TEMPERATURE: 98.2 F | RESPIRATION RATE: 16 BRPM | SYSTOLIC BLOOD PRESSURE: 132 MMHG | DIASTOLIC BLOOD PRESSURE: 82 MMHG

## 2024-11-22 ENCOUNTER — CLINICAL SUPPORT (OUTPATIENT)
Dept: RHEUMATOLOGY | Facility: CLINIC | Age: 69
End: 2024-11-22
Payer: COMMERCIAL

## 2024-11-22 DIAGNOSIS — M80.00XS AGE-RELATED OSTEOPOROSIS WITH CURRENT PATHOLOGICAL FRACTURE, SEQUELA: Primary | ICD-10-CM

## 2024-11-22 PROCEDURE — 96372 THER/PROPH/DIAG INJ SC/IM: CPT

## 2024-11-22 NOTE — PROGRESS NOTES
Assessment/Plan:    Ragini Melendez came into the [facility] Rheumatology Office today 11/22/24 to receive Prolia injection.      Verbal consent obtained.  Consent given by: patient    patient states patient has been medically healthy with no underlining concerns/complications.      Ragini Melendez presents with No symptoms today.       All insturctions were reviewed with the patient.    If the patient should have any questions/concerns, advised patient to contacted [facility] Rheumatology Office.       Subjective:     History provided by: patient    Patient ID: Ragini Melendez is a 69 y.o. female      Objective:    There were no vitals filed for this visit.    Patient tolerated the injection well without any complications.  Injection site/s left arm.  Medication was provided by Rheumatology.    Patient signed consent form yes   Patient signed ABN form no (If no patient is not a medicare patient).   Patient waited 15 minutes after injection yes (This only applies to patient's receiving first time injection).       Last Visit: 9/17/2024  Next visit:3/13/2025

## 2024-11-26 ENCOUNTER — HOME CARE VISIT (OUTPATIENT)
Dept: HOME HEALTH SERVICES | Facility: HOME HEALTHCARE | Age: 69
End: 2024-11-26
Payer: COMMERCIAL

## 2024-11-26 PROCEDURE — G0299 HHS/HOSPICE OF RN EA 15 MIN: HCPCS

## 2024-11-27 ENCOUNTER — TELEPHONE (OUTPATIENT)
Dept: RADIOLOGY | Facility: HOSPITAL | Age: 69
End: 2024-11-27

## 2024-11-27 VITALS
HEART RATE: 76 BPM | OXYGEN SATURATION: 97 % | SYSTOLIC BLOOD PRESSURE: 122 MMHG | DIASTOLIC BLOOD PRESSURE: 74 MMHG | RESPIRATION RATE: 16 BRPM | TEMPERATURE: 97.8 F

## 2024-11-27 RX ORDER — SODIUM CHLORIDE 9 MG/ML
75 INJECTION, SOLUTION INTRAVENOUS CONTINUOUS
Status: CANCELLED | OUTPATIENT
Start: 2024-11-27

## 2024-11-27 NOTE — PRE-PROCEDURE INSTRUCTIONS
Pre-procedure Instructions for Interventional Radiology  [address] [address]  INTERVENTIONAL RADIOLOGY [phone]    You are scheduled for a/an nephrostomy/nephroureteral exchange/conversion.    On Wednesday 12-4-24.    Your tentative arrival time is 10:45am.  Short stay will notify you the day before your procedure with the exact arrival time and the location to arrive.    To prepare for your procedure:  Please arrange for someone to drive you home after the procedure and stay with you until the next morning if you are instructed to do so.  This is typically for patients receiving some type of sedative or anesthetic for the procedure.  DO NOT EAT OR DRINK ANYTHING after midnight on the evening before your procedure including candy & gum.  ONLY SIPS OF WATER with your medications are allowed on the morning of your procedure.  TAKE ALL OF YOUR REGULAR MEDICATIONS THE MORNING OF YOUR PROCEDURE with sips of water!  We may call you to stop some of your blood sugar, blood pressure and blood thinning medications depending on the procedure.  Please take all of these medications unless we instruct you to stop them.  If you have an allergy to x-ray dye, please contact Interventional Radiology for an x-ray dye preparation which usually consists of an oral steroid and Benadryl.    The day of your procedure:  Bring a list of the medications you take at home.  Bring medications you take for breathing problems (such as inhalers), medications for chest pain, or both.  Bring a case for your glasses or contacts.  Bring your insurance card and a form of photo ID.  Please leave all valuables such as credit cards and jewelry at home.  Report to the admitting office to the left of the registration desk in the main lobby at the [facility], Entrance B.  You will then be directed to the Short Stay Center.  While your procedure is being performed, your family may wait in the Radiology  Waiting Room on the 1st floor in Radiology.  if they need to leave, they may provide a number to be called following the procedure.   Be prepared to stay overnight just in case. Sometimes procedures will indicate the need for further observation or treatment.   If you are scheduled for a follow-up visit with the Interventional Radiologist after your procedure, you will be called with a date and time.    Special Instructions (Medications to stop taking before your procedure etc.):

## 2024-11-29 ENCOUNTER — HOME CARE VISIT (OUTPATIENT)
Dept: HOME HEALTH SERVICES | Facility: HOME HEALTHCARE | Age: 69
End: 2024-11-29
Payer: COMMERCIAL

## 2024-11-30 ENCOUNTER — HOME CARE VISIT (OUTPATIENT)
Dept: HOME HEALTH SERVICES | Facility: HOME HEALTHCARE | Age: 69
End: 2024-11-30
Payer: COMMERCIAL

## 2024-12-04 ENCOUNTER — HOSPITAL ENCOUNTER (OUTPATIENT)
Dept: RADIOLOGY | Facility: HOSPITAL | Age: 69
Discharge: HOME | End: 2024-12-04
Attending: STUDENT IN AN ORGANIZED HEALTH CARE EDUCATION/TRAINING PROGRAM
Payer: COMMERCIAL

## 2024-12-04 VITALS
RESPIRATION RATE: 18 BRPM | BODY MASS INDEX: 32.36 KG/M2 | WEIGHT: 150 LBS | HEART RATE: 80 BPM | DIASTOLIC BLOOD PRESSURE: 70 MMHG | OXYGEN SATURATION: 100 % | HEIGHT: 57 IN | SYSTOLIC BLOOD PRESSURE: 123 MMHG | TEMPERATURE: 98.7 F

## 2024-12-04 DIAGNOSIS — N13.5 BILATERAL URETERAL OBSTRUCTION: ICD-10-CM

## 2024-12-04 PROCEDURE — 50435 EXCHANGE NEPHROSTOMY CATH: CPT

## 2024-12-04 PROCEDURE — 99152 MOD SED SAME PHYS/QHP 5/>YRS: CPT

## 2024-12-04 PROCEDURE — C1887 CATHETER, GUIDING: HCPCS

## 2024-12-04 PROCEDURE — 75984 XRAY CONTROL CATHETER CHANGE: CPT

## 2024-12-04 PROCEDURE — C1769 GUIDE WIRE: HCPCS

## 2024-12-04 PROCEDURE — 50434 CONVERT NEPHROSTOMY CATHETER: CPT

## 2024-12-04 PROCEDURE — 50435 EXCHANGE NEPHROSTOMY CATH: CPT | Performed by: RADIOLOGY

## 2024-12-04 PROCEDURE — 75984 XRAY CONTROL CATHETER CHANGE: CPT | Performed by: RADIOLOGY

## 2024-12-04 PROCEDURE — C1729 CATH, DRAINAGE: HCPCS

## 2024-12-04 PROCEDURE — 99152 MOD SED SAME PHYS/QHP 5/>YRS: CPT | Performed by: RADIOLOGY

## 2024-12-04 PROCEDURE — 50688 CHANGE OF URETER TUBE/STENT: CPT | Performed by: RADIOLOGY

## 2024-12-04 PROCEDURE — 99153 MOD SED SAME PHYS/QHP EA: CPT

## 2024-12-04 RX ORDER — SODIUM CHLORIDE 9 MG/ML
75 INJECTION, SOLUTION INTRAVENOUS CONTINUOUS
Status: DISCONTINUED | OUTPATIENT
Start: 2024-12-04 | End: 2024-12-05 | Stop reason: HOSPADM

## 2024-12-04 RX ORDER — FENTANYL CITRATE 50 UG/ML
INJECTION, SOLUTION INTRAMUSCULAR; INTRAVENOUS AS NEEDED
Status: COMPLETED | OUTPATIENT
Start: 2024-12-04 | End: 2024-12-04

## 2024-12-04 RX ORDER — MIDAZOLAM HYDROCHLORIDE 2 MG/2ML
INJECTION, SOLUTION INTRAMUSCULAR; INTRAVENOUS AS NEEDED
Status: COMPLETED | OUTPATIENT
Start: 2024-12-04 | End: 2024-12-04

## 2024-12-04 RX ADMIN — IOHEXOL 30 ML: 350 INJECTION, SOLUTION INTRAVENOUS at 14:36

## 2024-12-04 RX ADMIN — MIDAZOLAM 1 MG: 1 INJECTION INTRAMUSCULAR; INTRAVENOUS at 13:04

## 2024-12-04 RX ADMIN — FENTANYL CITRATE 50 MCG: 50 INJECTION INTRAMUSCULAR; INTRAVENOUS at 13:41

## 2024-12-04 RX ADMIN — FENTANYL CITRATE 50 MCG: 50 INJECTION INTRAMUSCULAR; INTRAVENOUS at 13:05

## 2024-12-04 RX ADMIN — FENTANYL CITRATE 50 MCG: 50 INJECTION INTRAMUSCULAR; INTRAVENOUS at 14:17

## 2024-12-04 RX ADMIN — MIDAZOLAM 1 MG: 1 INJECTION INTRAMUSCULAR; INTRAVENOUS at 13:41

## 2024-12-04 RX ADMIN — FENTANYL CITRATE 50 MCG: 50 INJECTION INTRAMUSCULAR; INTRAVENOUS at 12:45

## 2024-12-04 RX ADMIN — SODIUM CHLORIDE 75 ML/HR: 0.9 INJECTION, SOLUTION INTRAVENOUS at 11:20

## 2024-12-04 RX ADMIN — MIDAZOLAM 1 MG: 1 INJECTION INTRAMUSCULAR; INTRAVENOUS at 12:45

## 2024-12-04 RX ADMIN — MIDAZOLAM 0.5 MG: 1 INJECTION INTRAMUSCULAR; INTRAVENOUS at 14:17

## 2024-12-04 NOTE — DISCHARGE INSTRUCTIONS
Nephrostomy Tube Care     WHAT YOU NEED TO KNOW:   A nephrostomy tube is a catheter (thin plastic tube) that is inserted through your skin and into your kidney. The nephrostomy tube drains urine from your kidney into a collecting bag outside your body. You may need a nephrostomy tube when something is blocking the normal flow of urine. A nephrostomy tube may be used for a short or a long period of time. The nephrostomy tube comes out of your back, so you will need someone to help care for your nephrostomy tube.          DISCHARGE INSTRUCTIONS:      How to clean the skin around the nephrostomy tube and change the bandage:  Since the nephrostomy tube comes out of your back, you will not be able to care for it by yourself. Ask someone to follow the general directions below to check and care for your nephrostomy tube.   Gather the items you will need.          Disposable (single use) under-pad, and a clean washcloth  Plain soap, warm water, and new medical gloves  Sterile gauze bandages  Clear adhesive dressing or medical tape  Skin barrier  Protective skin film  Trash bag  Remove the old bandage, and check the tube entry site.    Have the patient lie on his side with the nephrostomy tube entry site facing up. Place the under-pad where it will catch drainage as you are working with the nephrostomy tube.   Wash your hands with soap and water. Put on new medical gloves.  Gently remove the old bandage, without pulling on the tube. Do this by holding the skin beside the tube with one hand. With the other hand, gently remove sticky tape and the skin barrier by pulling in the same direction as hair growth. Do not touch the side of the bandage that is placed over or around the tube. Throw the bandage and skin barrier away in a trash bag.  Look for signs of infection, such as skin redness and swelling. Report any skin changes to healthcare providers.  Clean the tube entry site.    Hold the tube in place to keep it from  being pulled out while you are cleaning around it.  You will need to clean the area twice. For the first cleaning, wet a new gauze bandage with soap and water.  Begin at the entry site of the tube. Wipe the skin in circles, moving away from the entry site. Remove blood and any other material with the gauze. Do this as often as needed. Use a new gauze bandage each time you clean the area, moving away from the entry site.   For the second cleaning, wet a new gauze bandage with water. Begin at the entry site of the tube. Wipe the skin in circles, moving away from the entry site. Use a new gauze bandage each time you clean the area, moving away from the entry site.   Gently pat the skin with a clean washcloth to dry it.    Apply the skin barrier and bandages.    Roll up a bandage to make it thick, and place it under  the place where the tube enters the skin. Place it to support the tube, and stop it from kinking or bending. Tape the bandage in place, and apply more bandages if directed by a healthcare provider.   Bring the tubing forward to the front and tape it to the skin. Do not stretch the tube tight, because this may pull the nephrostomy tube out.  How often to change the bandage.  Change the bandage around the tube, every other day. If your bandages  get dirty or wet, change them right away, and as often as needed. If your nephrostomy tube is to be used for a long period of time, the tube needs to be changed every 2 to 3 months. Healthcare providers will tell you when you need to make an appointment to have your tube changed.     How to care for the urine drainage bag:   Ask if you need to measure and write down how much urine is in the bag before you empty it. Drain urine out of the drainage bag when it is ½ to ? full. Open the spout at the bottom of the bag to empty the urine into the toilet.   You may need to detach the drainage bag from the nephrostomy tube to change it.. If so, attach a new drainage bag  tightly to the nephrostomy tube.     How to prevent problems with your nephrostomy tube:   Change bandages, directed.  This helps to prevent infection. Throw away or clean your drainage bag as directed by your healthcare provider.    Wipe the connecting ends of the drainage bag with alcohol before you reconnect the bag to the tube.  This helps prevent infection.     Keep the tube taped to your skin and connected to a drainage bag placed below the level of your kidneys.  This helps prevent urine from backing up into your kidneys. You may wear a small drainage bag strapped to your leg to let you move around more easily.    Check the catheter to be sure it is in place after you change your clothes or do other activities.  Do not wear tight clothing over the tube. Place the tubing over your thigh rather than under it when you are sitting down. Be sure that nothing is pulling on the nephrostomy tube when you move around.    Change positions if you see little or no urine in your drainage bag.  Check to see if the urine tube is twisted or bent. Be sure that you are not sitting or lying on the tube. If there are no kinks and there is little or no urine in the drainage bag, tell your healthcare provider.    Flush out the tube as directed. Some tubes get flushed one time a day with 10 mls of NSS You will be given a prescription for the flushes.  To flush the nephrostomy tube, clean both connections with alcohol swap. Twist off the drainage bag tube and twist the saline syringe into the nephrostomy tube and flush briskly. Remove the syringe and twist the drainage bag tube back into the nephrostomy tube.  Keep the site covered while you shower.  Tape a piece of clear adhesive plastic over the dressing to keep it dry while you shower. Do not take tub baths.    Contact Interventional Radiology at [phone] (MACIEJ PATIENTS: Contact Interventional Radiology at [phone]) (MICHAEL PATIENTS: Contact Interventional Radiology at  [phone]) if:  The skin around the nephrostomy tube is red, swollen, itches, or has a rash.   You have a fever greater than 101 or chills.  You have lower back or hip pain.  There are changes in how your urine looks or smells.  You have little or no urine draining from the nephrostomy tube.   You have nausea and are vomiting.  The black bernardo on your tube has moved, or the tube is longer than when it was put in.   You have questions or concerns about your condition or care.  The nephrostomy tube comes out completely.   There is blood, pus, or a bad smell coming from the place where the tube enters your skin.  Urine is leaking around the tube.        The following pharmacies carry the flush syringes.       [facility]                     [facility]                         [facility]       [address]                     [address]                    [phone]  [place]                       [place]  Phone [phone]            Phone [phone]                 [facility]                                                                                                   [phone]  [facility]             [facility]                             [address] [place], [place] [address] [facility]   Luis SANCHEZ                                 [phone]  Phone [phone]            Phone [phone]    [facility]                                                                         [facility] [phone]  [address]  [place]   Phone [phone]         Moderate Sedation   WHAT YOU NEED TO KNOW:   Moderate sedation, or conscious sedation, is medicine used during procedures to help you feel relaxed and calm. You will be awake and able to follow directions without anxiety or pain. You will remember little to none of the procedure. You may feel tired, weak, or unsteady on  your feet after you get sedation. You may also have trouble concentrating or short-term memory loss. These symptoms should go away in 24 hours or less.   DISCHARGE INSTRUCTIONS:   Call 911 or have someone else call for any of the following:   You have sudden trouble breathing.     You cannot be woken.  Seek care immediately if:   You have a severe headache or dizziness.     Your heart is beating faster than usual.  Contact your healthcare provider if:   You have a fever.     You have nausea or are vomiting for more than 8 hours after the procedure.      Your skin is itchy, swollen, or you have a rash.     You have questions or concerns about your condition or care.  Self-care:   Have someone stay with you for 24 hours. This person can drive you to errands and help you do things around the house. This person can also watch for problems.      Rest and do quiet activities for 24 hours. Do not exercise, ride a bike, or play sports. Stand up slowly to prevent dizziness and falls. Take short walks around the house with another person. Slowly return to your usual activities the next day.      Do not drive or use dangerous machines or tools for 24 hours. You may injure yourself or others. Examples include a lawnmower, saw, or drill. Do not return to work for 24 hours if you use dangerous machines or tools for work.      Do not make important decisions for 24 hours. For example, do not sign important papers or invest money.      Drink liquids as directed. Liquids help flush the sedation medicine out of your body. Ask how much liquid to drink each day and which liquids are best for you.      Eat small, frequent meals to prevent nausea and vomiting. Start with clear liquids such as juice or broth. If you do not vomit after clear liquids, you can eat your usual foods.      Do not drink alcohol or take medicines that make you drowsy. This includes medicines that help you sleep and anxiety medicines. Ask your healthcare provider  if it is safe for you to take pain medicine.  Follow up with your healthcare provider as directed: Write down your questions so you remember to ask them during your visits.   © 2017 [employer] Information is for End User's use only and may not be sold, redistributed or otherwise used for commercial purposes. All illustrations and images included in CareNotes® are the copyrighted property of [employer] or [employer].  The above information is an  only. It is not intended as medical advice for individual conditions or treatments. Talk to your doctor, nurse or pharmacist before following any medical regimen to see if it is safe and effective for you.

## 2024-12-04 NOTE — BRIEF OP NOTE (RAD/CATH)
INTERVENTIONAL RADIOLOGY PROCEDURE NOTE    Date: 12/4/2024    Procedure:   Procedure Summary       Date: 12/04/24 Room / Location: [facility] Interventional Radiology    Anesthesia Start:  Anesthesia Stop:     Procedure: IR NEPHROSTOMY/PCNU CONVERSION TO RETROGRADE/ILEAL CONDUIT NEPHROSTOMY Diagnosis:       Bilateral ureteral obstruction      (conversion of left ileal conduit double j ureteral stent to reverse nephrostomy; attempt conversion of right PCN to reverse nephrostomy)    Scheduled Providers:  Responsible Provider:     Anesthesia Type: Not recorded ASA Status: Not recorded            Preoperative diagnosis:   1. Bilateral ureteral obstruction         Postoperative diagnosis: Same.    Surgeon: Gilberto Jennings MD     Assistant: None. No qualified resident was available.    Blood loss: Minimal    Specimens: None     Findings: Left retrograde PNCU placed. Right collecting system abnormal anatomy due to stenotic central portion. Tube in the past was placed into a dilated calyx. Unable to advance wire into ureter secondary to stenosis and angle. Right PCN exchanged.    Complications: None immediate.    Anesthesia: conscious sedation

## 2024-12-05 ENCOUNTER — PATIENT OUTREACH (OUTPATIENT)
Dept: CASE MANAGEMENT | Facility: HOSPITAL | Age: 69
End: 2024-12-05

## 2024-12-05 ENCOUNTER — HOME CARE VISIT (OUTPATIENT)
Dept: HOME HEALTH SERVICES | Facility: HOME HEALTHCARE | Age: 69
End: 2024-12-05
Payer: COMMERCIAL

## 2024-12-05 VITALS — SYSTOLIC BLOOD PRESSURE: 134 MMHG | DIASTOLIC BLOOD PRESSURE: 78 MMHG | TEMPERATURE: 99.1 F

## 2024-12-05 PROBLEM — C79.89 SECONDARY MALIGNANT NEOPLASM OF OTHER SPECIFIED SITES (HCC): Status: RESOLVED | Noted: 2021-07-07 | Resolved: 2024-12-05

## 2024-12-05 PROCEDURE — G0299 HHS/HOSPICE OF RN EA 15 MIN: HCPCS

## 2024-12-05 NOTE — ASSESSMENT & PLAN NOTE
PCN in place. Likely secondary to kidney atrophy/nonfunctioning status.   Little output from left PCN  Continue follow up with nephro

## 2024-12-05 NOTE — ASSESSMENT & PLAN NOTE
PCN on right removed. Internal stent in place.  F/u with urology and IR.         Detail Level: Zone Additional Notes: cuts/scrapes on forearms.  Patient says from a puppy.  Can use mupirocin ointment BID

## 2024-12-05 NOTE — ASSESSMENT & PLAN NOTE
69-year-old with h/o recurrent stage IB endometrial cancer in remission presents for follow up    Clinically [facility]  Last imaging 7/2024 [facility]  Consider repeat next year.    RTC 6 months for continued surveillance

## 2024-12-05 NOTE — PROGRESS NOTES
Chart reviewed. Call placed to patient. Patient says she is doing well. She had her nephrostomy tubes changed yesterday and she is having no issues since. She denies any fever or chills. She says she still has [facility] nursing combing out to the house and they will be out today. She states she has all of her medications and she is taking them as directed. She does not need any refills. We did discuss calling for refills before she has less then 1 week of pills left. She denies any questions or concerns regarding. Patient is not in need of further CCM services at this time and CCM episode closed and this RNCM removed self from care team.

## 2024-12-05 NOTE — PROGRESS NOTES
Name: Ragini Melendez      : 1955      MRN: [id number]  Encounter Provider: Beth Rodriguez MD  Encounter Date: 2024   Encounter department: [facility] [place]       Cancer Staging   Endometrial cancer (HCC)  Staging form: Corpus Uteri - Carcinoma, AJCC 7th Edition  - Clinical stage from 2017: FIGO Stage IB (T1b, N0, M0) - Signed by Evgeny So MD on 2018  :  Assessment & Plan  Nephrostomy status (HCC)  PCN on right removed. Internal stent in place.  F/u with urology and IR.        Obstruction of right ureter  PCN in place. Likely secondary to kidney atrophy/nonfunctioning status.   Little output from left PCN  Continue follow up with nephro       Encounter for follow-up surveillance of endometrial cancer  69-year-old with h/o recurrent stage IB endometrial cancer in remission presents for follow up    Clinically [facility]  Last imaging 2024 [facility]  Consider repeat next year.    RTC 6 months for continued surveillance            History of Present Illness   Reason for Visit / CC: follow up    69-year-old with h/o recurrent stage IB endometrial cancer in remission presents for follow up. Pt underwent attempted ileal conduit urinary diversion given need for persistent PCNs. She is undergoign attempt at removal of PCNs slowly. Pt reports feels better with removal of the left PCN. She is still trying to figure out stoma bag and has some issues with leaking. Right PCN not making anything. No abdominal pain/discomfort.       Ragini Melendez is a 69 y.o. female   Pertinent Medical History      Oncology History   Oncology History   Endometrial cancer (HCC)   2017 Initial Diagnosis    Endometrial cancer (HCC)     2017 Biopsy    ENDOMETRIAL BIOPSY: WELL DIFFERENTIATED ENDOMETRIAL ADENOCARCINOMA (FIGO I) WITH FOCALMUCINOUS FEATURES.    Part B: ENDOCERVICAL POLYP:BENIGN ENDOCERVICAL      2017 Surgery    Robotic assisted total laparoscopic hysterectomy with  bilateral salpingo-oophorectomy and sentinel bilateral pelvic lymph node dissection. 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 (4.4 x 3.2 cm tumor, 9.4/15.4mm invasion, NO LVSI, washings revealed atypical cellular changes)     12/19/2017 Genetic Testing    Morrison testing negative     6/28/2019 Biopsy    A. Breast, Right, US BX Right Breast 1000 4 cmfn:  - Benign breast tissue with focal histiocytic aggregate.  See comment.  - Negative for atypia and in-situ or invasive carcinoma.     7/8/2019 Recurrence    Presented with right lower extremity DVT and CT demonstrating right pelvic sidewall mass with venous, ureteral and nerve compression causing significant neuropathic pain.      Biopsy:  Lymph Node, Right pelvic lymph node x3:  - High-grade adenocarcinoma.      7/30/2019 - 1/6/2020 Chemotherapy    Taxol 175 mg/m2 and carboplatin AUC 6 every 21 days. Dose was reduced to taxol 135 mg/m2 and carboplatin AUC 5. Completed 6 cycles.  Treatment protracted due to multiple hospital admissions.     2/24/2020 - 4/13/2020 Radiation    adjuvant external beam radiation therapy to the whole pelvis to 4500 cGy followed by boost to gross disease of an additional 2200 cGy     11/23/2020 Progression    New necrotic adenopathy in the retroperitoneum on CT.     12/21/2020 - 12/5/2022 Chemotherapy    Began chemotherapy with rucaparib, atezolizumab, and bevacizumab as per EndoBARR clinical trial.    Rucaparib held cycle 28 secondary to fatigue  Avastin held since cycle 30 for elevated UPC  Treatment held for one cycle after cycle 31 for mucositis/fatigue          Review of Systems   Constitutional: Negative.    HENT: Negative.     Eyes: Negative.    Respiratory: Negative.     Cardiovascular: Negative.    Gastrointestinal: Negative.    Endocrine: Negative.    Genitourinary: Negative.    Musculoskeletal: Negative.    Neurological: Negative.    Psychiatric/Behavioral: Negative.      A complete review of systems is negative  "other than that noted above in the HPI.       Objective   /74 (Patient Position: Sitting, Cuff Size: Standard)   Pulse 84   Resp 18   Ht 4' 9\" (1.448 m)   Wt 66 kg (145 lb 8 oz)   LMP  (LMP Unknown)   BMI 31.49 kg/m²     Blood pressure 124/74, pulse 84, resp. rate 18, height 4' 9\" (1.448 m), weight 66 kg (145 lb 8 oz), not currently breastfeeding.  Body mass index is 31.49 kg/m².  ECOG ECOG Performance Status: 1 - Restricted in physically strenuous activity but ambulatory and able to carry out work of a light or sedentary nature, e.g., light house work, office work  Physical Exam  HENT:      Head: Normocephalic and atraumatic.      Nose: Nose normal.   Cardiovascular:      Rate and Rhythm: Normal rate and regular rhythm.   Pulmonary:      Effort: Pulmonary effort is normal.   Abdominal:      General: There is no distension.      Palpations: Abdomen is soft. There is no mass.      Comments: Stoma healthy appearing, stent in place, clear urine.    Genitourinary:     Comments: defer  Musculoskeletal:         General: No swelling. Normal range of motion.      Cervical back: Normal range of motion.   Skin:     General: Skin is warm and dry.   Neurological:      General: No focal deficit present.      Mental Status: She is alert.   Psychiatric:         Mood and Affect: Mood normal.          Labs: I have reviewed pertinent labs.   No results found for: \"\"  Lab Results   Component Value Date     10/27/2017    K 4.0 11/08/2024     11/08/2024    CO2 20 (L) 11/08/2024    BUN 33 (H) 11/08/2024    CREATININE 1.91 (H) 11/08/2024    GLUCOSE 219 (H) 12/19/2017    GLUF 89 11/08/2024    CALCIUM 8.7 11/08/2024    CORRECTEDCA 9.4 10/30/2024    AST 10 (L) 10/30/2024    ALT 9 10/30/2024    ALKPHOS 84 10/30/2024    PROT 6.9 10/27/2017    BILITOT 0.3 10/27/2017    EGFR 26 11/08/2024     Lab Results   Component Value Date    WBC 9.33 10/30/2024    HGB 9.0 (L) 10/30/2024    HCT 29.3 (L) 10/30/2024    MCV 95 " "10/30/2024     10/30/2024     Lab Results   Component Value Date    NEUTROABS 7.26 10/30/2024        Trend:  No results found for: \"\"      Pathology: I have reviewed pathology reports described above.          "

## 2024-12-06 ENCOUNTER — OFFICE VISIT (OUTPATIENT)
Dept: GYNECOLOGIC ONCOLOGY | Facility: CLINIC | Age: 69
End: 2024-12-06
Payer: COMMERCIAL

## 2024-12-06 VITALS
HEIGHT: 57 IN | WEIGHT: 145.5 LBS | BODY MASS INDEX: 31.39 KG/M2 | SYSTOLIC BLOOD PRESSURE: 124 MMHG | HEART RATE: 84 BPM | RESPIRATION RATE: 18 BRPM | DIASTOLIC BLOOD PRESSURE: 74 MMHG

## 2024-12-06 DIAGNOSIS — N13.5 OBSTRUCTION OF RIGHT URETER: ICD-10-CM

## 2024-12-06 DIAGNOSIS — Z93.6 NEPHROSTOMY STATUS (HCC): ICD-10-CM

## 2024-12-06 DIAGNOSIS — Z08 ENCOUNTER FOR FOLLOW-UP SURVEILLANCE OF ENDOMETRIAL CANCER: Primary | ICD-10-CM

## 2024-12-06 DIAGNOSIS — Z85.42 ENCOUNTER FOR FOLLOW-UP SURVEILLANCE OF ENDOMETRIAL CANCER: Primary | ICD-10-CM

## 2024-12-06 PROCEDURE — G2211 COMPLEX E/M VISIT ADD ON: HCPCS | Performed by: OBSTETRICS & GYNECOLOGY

## 2024-12-06 PROCEDURE — 99213 OFFICE O/P EST LOW 20 MIN: CPT | Performed by: OBSTETRICS & GYNECOLOGY

## 2024-12-10 ENCOUNTER — HOME CARE VISIT (OUTPATIENT)
Dept: HOME HEALTH SERVICES | Facility: HOME HEALTHCARE | Age: 69
End: 2024-12-10
Payer: COMMERCIAL

## 2024-12-10 VITALS
RESPIRATION RATE: 16 BRPM | DIASTOLIC BLOOD PRESSURE: 60 MMHG | HEART RATE: 80 BPM | OXYGEN SATURATION: 100 % | TEMPERATURE: 98.6 F | SYSTOLIC BLOOD PRESSURE: 122 MMHG

## 2024-12-10 PROCEDURE — G0299 HHS/HOSPICE OF RN EA 15 MIN: HCPCS

## 2024-12-17 ENCOUNTER — OFFICE VISIT (OUTPATIENT)
Dept: UROLOGY | Facility: AMBULATORY SURGERY CENTER | Age: 69
End: 2024-12-17

## 2024-12-17 VITALS
OXYGEN SATURATION: 99 % | WEIGHT: 147 LBS | HEIGHT: 57 IN | BODY MASS INDEX: 31.71 KG/M2 | SYSTOLIC BLOOD PRESSURE: 116 MMHG | DIASTOLIC BLOOD PRESSURE: 82 MMHG | HEART RATE: 84 BPM

## 2024-12-17 DIAGNOSIS — N13.5 URETERAL STRICTURE: Primary | ICD-10-CM

## 2024-12-17 PROCEDURE — 99024 POSTOP FOLLOW-UP VISIT: CPT | Performed by: UROLOGY

## 2024-12-17 NOTE — LETTER
2024     Emely Yost DO   [facility] [address]    Patient: Ragini Melendez   YOB: 1955   Date of Visit: 2024       Dear Dr. Yost:    Thank you for referring Ragini Melendez to me for evaluation. Below are my notes for this consultation.    If you have questions, please do not hesitate to call me. I look forward to following your patient along with you.         Sincerely,        Demetrius Lowe MD        CC: MD Demetrius Delgado MD  2024  1:59 PM  Sign when Signing Visit  Name: Ragini Melendez      : 1955      MRN: [id number]  Encounter Provider: Demetrius Lowe MD  Encounter Date: 2024   Encounter department: [facility] UROLOGY [place]  :  Assessment & Plan    Impression: History of gynecological malignancy/endometrial carcinoma, history of ASH/BSO followed by radiation, bilateral radiation-induced ureteral strictures, status post ileal conduit creation    Plan: Her left kidney is now managed with a indwelling single-J ureteral stent.  This will be changed in interventional radiology every 3 months or so.  Her right nephrostomy tube will remain open to gravity.  Fortunately it is only making approximately 100 cc of output for 24 hours.  The right kidney is relatively atrophic on CT scan.  Ultimately if the right kidney fails and becomes anuric or markedly decreased output I would simply clamp the right nephrostomy tube with a goal to ultimately remove it.  For now the patient is very pleased with the results of her surgery since her left nephrostomy tube has been removed.  Follow-up in 3 months to reassess.      History of Present Illness  Ragini Melendez is a 69 y.o. female who presents in follow-up for history of gynecological malignancy.  She received pelvic radiation.  She developed marked bilateral distal ureteral strictures.  More recently she went to the operating room where I  "performed creation of an ileal conduit.  She still has a right nephrostomy tube in place.  The left side was converted to a PCNU.  The right side has a nephrostomy tube in place.  When the patient recently returned to interventional radiology on December 4 a wire could not be manipulated into the ureter.  The right kidney is relatively atrophic.  When she did have stents and previously she had significant urgency and incontinence.  She states that her right nephrostomy tube is making approximately 100 cc of output per 24-hour timeframe.    Pathology at the time of resection revealed the left ureter with dense fibroconnective tissue.  This was negative for malignancy.  On the right side dense fibroconnective tissue was also identified with segments of tubular smooth muscle but without an epithelial lining raising the concern that the right sided ureter was actually a blood vessel.      Review of Systems       Objective  /82 (BP Location: Left arm, Patient Position: Sitting, Cuff Size: Adult)   Pulse 84   Ht 4' 9\" (1.448 m)   Wt 66.7 kg (147 lb)   LMP  (LMP Unknown)   SpO2 99%   BMI 31.81 kg/m²     Physical Exam on examination she is in no acute distress.  Her stoma is pink and viable.  Copious clear yellow urine is in the bag.  A single-J left stent is noted.  Her right nephrostomy tube is open to gravity with approximately 100 cc in the bag of clear urine    Results  No results found for: \"PSA\"  Lab Results   Component Value Date    GLUCOSE 219 (H) 12/19/2017    CALCIUM 8.7 11/08/2024     10/27/2017    K 4.0 11/08/2024    CO2 20 (L) 11/08/2024     11/08/2024    BUN 33 (H) 11/08/2024    CREATININE 1.91 (H) 11/08/2024     Lab Results   Component Value Date    WBC 9.33 10/30/2024    HGB 9.0 (L) 10/30/2024    HCT 29.3 (L) 10/30/2024    MCV 95 10/30/2024     10/30/2024       Office Urine Dip  No results found for this or any previous visit (from the past hour).]       "

## 2024-12-17 NOTE — PROGRESS NOTES
Name: Ragini Melendez      : 1955      MRN: [id number]  Encounter Provider: Dmeetrius Lowe MD  Encounter Date: 2024   Encounter department: [facility]OLOG[facility] [place]  :  Assessment & Plan  Ureteral stricture      Impression: History of gynecological malignancy/endometrial carcinoma, history of ASH/BSO followed by radiation, bilateral radiation-induced ureteral strictures, status post ileal conduit creation    Plan: Her left kidney is now managed with a indwelling single-J ureteral stent.  This will be changed in interventional radiology every 3 months or so.  Her right nephrostomy tube will remain open to gravity.  Fortunately it is only making approximately 100 cc of output for 24 hours.  The right kidney is relatively atrophic on CT scan.  Ultimately if the right kidney fails and becomes anuric or markedly decreased output I would simply clamp the right nephrostomy tube with a goal to ultimately remove it.  For now the patient is very pleased with the results of her surgery since her left nephrostomy tube has been removed.  Follow-up in 3 months to reassess.      History of Present Illness   Ragini Melendez is a 69 y.o. female who presents in follow-up for history of gynecological malignancy.  She received pelvic radiation.  She developed marked bilateral distal ureteral strictures.  More recently she went to the operating room where I performed creation of an ileal conduit.  She still has a right nephrostomy tube in place.  The left side was converted to a PCNU.  The right side has a nephrostomy tube in place.  When the patient recently returned to interventional radiology on  a wire could not be manipulated into the ureter.  The right kidney is relatively atrophic.  When she did have stents and previously she had significant urgency and incontinence.  She states that her right nephrostomy tube is making approximately 100 cc of output per 24-hour  "timeframe.    Pathology at the time of resection revealed the left ureter with dense fibroconnective tissue.  This was negative for malignancy.  On the right side dense fibroconnective tissue was also identified with segments of tubular smooth muscle but without an epithelial lining raising the concern that the right sided ureter was actually a blood vessel.      Review of Systems       Objective   /82 (BP Location: Left arm, Patient Position: Sitting, Cuff Size: Adult)   Pulse 84   Ht 4' 9\" (1.448 m)   Wt 66.7 kg (147 lb)   LMP  (LMP Unknown)   SpO2 99%   BMI 31.81 kg/m²     Physical Exam on examination she is in no acute distress.  Her stoma is pink and viable.  Copious clear yellow urine is in the bag.  A single-J left stent is noted.  Her right nephrostomy tube is open to gravity with approximately 100 cc in the bag of clear urine    Results  No results found for: \"PSA\"  Lab Results   Component Value Date    GLUCOSE 219 (H) 12/19/2017    CALCIUM 8.7 11/08/2024     10/27/2017    K 4.0 11/08/2024    CO2 20 (L) 11/08/2024     11/08/2024    BUN 33 (H) 11/08/2024    CREATININE 1.91 (H) 11/08/2024     Lab Results   Component Value Date    WBC 9.33 10/30/2024    HGB 9.0 (L) 10/30/2024    HCT 29.3 (L) 10/30/2024    MCV 95 10/30/2024     10/30/2024       Office Urine Dip  No results found for this or any previous visit (from the past hour).]      "

## 2024-12-18 ENCOUNTER — HOME CARE VISIT (OUTPATIENT)
Dept: HOME HEALTH SERVICES | Facility: HOME HEALTHCARE | Age: 69
End: 2024-12-18
Payer: COMMERCIAL

## 2024-12-18 PROCEDURE — G0299 HHS/HOSPICE OF RN EA 15 MIN: HCPCS

## 2024-12-19 VITALS
HEART RATE: 74 BPM | OXYGEN SATURATION: 97 % | SYSTOLIC BLOOD PRESSURE: 129 MMHG | DIASTOLIC BLOOD PRESSURE: 73 MMHG | TEMPERATURE: 97.2 F

## 2024-12-20 ENCOUNTER — VBI (OUTPATIENT)
Dept: ADMINISTRATIVE | Facility: OTHER | Age: 69
End: 2024-12-20

## 2024-12-20 NOTE — TELEPHONE ENCOUNTER
12/20/24 1:55 PM     Chart reviewed for Diabetic Eye Exam ; nothing is submitted to the patient's insurance at this time.     Loco Garvin MA   [facility]

## 2024-12-26 ENCOUNTER — HOME CARE VISIT (OUTPATIENT)
Dept: HOME HEALTH SERVICES | Facility: HOME HEALTHCARE | Age: 69
End: 2024-12-26
Payer: COMMERCIAL

## 2024-12-26 VITALS
TEMPERATURE: 98.4 F | DIASTOLIC BLOOD PRESSURE: 70 MMHG | SYSTOLIC BLOOD PRESSURE: 120 MMHG | HEART RATE: 74 BPM | RESPIRATION RATE: 20 BRPM

## 2024-12-26 PROCEDURE — G0299 HHS/HOSPICE OF RN EA 15 MIN: HCPCS

## 2024-12-27 DIAGNOSIS — N25.81 SECONDARY HYPERPARATHYROIDISM (HCC): ICD-10-CM

## 2024-12-27 RX ORDER — CALCITRIOL 0.25 UG/1
0.25 CAPSULE, LIQUID FILLED ORAL 3 TIMES WEEKLY
Qty: 12 CAPSULE | Refills: 5 | Status: SHIPPED | OUTPATIENT
Start: 2024-12-27 | End: 2025-03-27

## 2024-12-27 NOTE — TELEPHONE ENCOUNTER
Reason for call:   [x] Refill   [] Prior Auth  [] Other:     Office:   [] PCP/Provider -   [x] Specialty/Provider - [facility]  Authorized By: Wade Villalobos MD    Medication:   calcitriol (ROCALTROL) 0.25 mcg capsule () 0.25 mcg, 3 times weekly         Pharmacy: [facility] [id number] [place] [address] [address]      Does the patient have enough for 3 days?   [] Yes   [x] No - Send as HP to POD

## 2024-12-28 ENCOUNTER — VBI (OUTPATIENT)
Dept: ADMINISTRATIVE | Facility: OTHER | Age: 69
End: 2024-12-28

## 2024-12-28 NOTE — TELEPHONE ENCOUNTER
12/28/24 9:41 AM     Chart reviewed for Hemoglobin A1c ; nothing is submitted to the patient's insurance at this time.     Brenda Marshall   [facility]

## 2024-12-30 ENCOUNTER — HOME CARE VISIT (OUTPATIENT)
Dept: HOME HEALTH SERVICES | Facility: HOME HEALTHCARE | Age: 69
End: 2024-12-30
Payer: COMMERCIAL

## 2024-12-30 VITALS
DIASTOLIC BLOOD PRESSURE: 86 MMHG | TEMPERATURE: 98.2 F | OXYGEN SATURATION: 100 % | RESPIRATION RATE: 18 BRPM | SYSTOLIC BLOOD PRESSURE: 136 MMHG | HEART RATE: 60 BPM

## 2024-12-30 PROCEDURE — G0299 HHS/HOSPICE OF RN EA 15 MIN: HCPCS

## 2025-01-10 ENCOUNTER — HOSPITAL ENCOUNTER (OUTPATIENT)
Dept: INFUSION CENTER | Facility: CLINIC | Age: 70
Discharge: HOME | End: 2025-01-10
Payer: COMMERCIAL

## 2025-01-10 DIAGNOSIS — C54.1 ENDOMETRIAL CANCER (HCC): ICD-10-CM

## 2025-01-10 DIAGNOSIS — Z45.2 ENCOUNTER FOR CENTRAL LINE CARE: Primary | ICD-10-CM

## 2025-01-10 PROCEDURE — 96523 IRRIG DRUG DELIVERY DEVICE: CPT

## 2025-01-14 ENCOUNTER — TELEPHONE (OUTPATIENT)
Age: 70
End: 2025-01-14

## 2025-01-14 NOTE — TELEPHONE ENCOUNTER
[facility] will be faxing over to [place] an order form for medical supplies

## 2025-02-03 DIAGNOSIS — Z93.6 S/P ILEAL CONDUIT (HCC): Primary | ICD-10-CM

## 2025-02-03 DIAGNOSIS — Z93.6 NEPHROSTOMY STATUS (HCC): ICD-10-CM

## 2025-02-10 ENCOUNTER — APPOINTMENT (OUTPATIENT)
Dept: LAB | Facility: CLINIC | Age: 70
End: 2025-02-10
Payer: COMMERCIAL

## 2025-02-10 ENCOUNTER — RESULTS FOLLOW-UP (OUTPATIENT)
Dept: OTHER | Facility: HOSPITAL | Age: 70
End: 2025-02-10

## 2025-02-10 DIAGNOSIS — D63.1 ANEMIA IN STAGE 4 CHRONIC KIDNEY DISEASE  (HCC): ICD-10-CM

## 2025-02-10 DIAGNOSIS — N18.4 STAGE 4 CHRONIC KIDNEY DISEASE (HCC): ICD-10-CM

## 2025-02-10 DIAGNOSIS — E83.39 HYPERPHOSPHATEMIA: ICD-10-CM

## 2025-02-10 DIAGNOSIS — N18.4 ANEMIA IN STAGE 4 CHRONIC KIDNEY DISEASE  (HCC): ICD-10-CM

## 2025-02-10 DIAGNOSIS — N13.30 BILATERAL HYDRONEPHROSIS: ICD-10-CM

## 2025-02-10 DIAGNOSIS — N13.5 URETERAL STRICTURE: ICD-10-CM

## 2025-02-10 DIAGNOSIS — N25.81 SECONDARY HYPERPARATHYROIDISM (HCC): ICD-10-CM

## 2025-02-10 DIAGNOSIS — Z86.39 HISTORY OF METABOLIC ACIDOSIS: ICD-10-CM

## 2025-02-10 LAB
ALBUMIN SERPL BCG-MCNC: 3.8 G/DL (ref 3.5–5)
AMORPH URATE CRY URNS QL MICRO: ABNORMAL
ANION GAP SERPL CALCULATED.3IONS-SCNC: 10 MMOL/L (ref 4–13)
BACTERIA UR QL AUTO: ABNORMAL /HPF
BASOPHILS # BLD AUTO: 0.06 THOUSANDS/ΜL (ref 0–0.1)
BASOPHILS NFR BLD AUTO: 1 % (ref 0–1)
BILIRUB UR QL STRIP: NEGATIVE
BUN SERPL-MCNC: 42 MG/DL (ref 5–25)
CALCIUM SERPL-MCNC: 7.2 MG/DL (ref 8.4–10.2)
CHLORIDE SERPL-SCNC: 112 MMOL/L (ref 96–108)
CLARITY UR: ABNORMAL
CO2 SERPL-SCNC: 20 MMOL/L (ref 21–32)
COLOR UR: ABNORMAL
CREAT SERPL-MCNC: 1.77 MG/DL (ref 0.6–1.3)
EOSINOPHIL # BLD AUTO: 0.1 THOUSAND/ΜL (ref 0–0.61)
EOSINOPHIL NFR BLD AUTO: 1 % (ref 0–6)
ERYTHROCYTE [DISTWIDTH] IN BLOOD BY AUTOMATED COUNT: 15.9 % (ref 11.6–15.1)
GFR SERPL CREATININE-BSD FRML MDRD: 29 ML/MIN/1.73SQ M
GLUCOSE P FAST SERPL-MCNC: 96 MG/DL (ref 65–99)
GLUCOSE UR STRIP-MCNC: NEGATIVE MG/DL
HCT VFR BLD AUTO: 30.7 % (ref 34.8–46.1)
HGB BLD-MCNC: 9.3 G/DL (ref 11.5–15.4)
HGB UR QL STRIP.AUTO: ABNORMAL
IMM GRANULOCYTES # BLD AUTO: 0.08 THOUSAND/UL (ref 0–0.2)
IMM GRANULOCYTES NFR BLD AUTO: 1 % (ref 0–2)
KETONES UR STRIP-MCNC: NEGATIVE MG/DL
LEUKOCYTE ESTERASE UR QL STRIP: ABNORMAL
LYMPHOCYTES # BLD AUTO: 1.14 THOUSANDS/ΜL (ref 0.6–4.47)
LYMPHOCYTES NFR BLD AUTO: 13 % (ref 14–44)
MCH RBC QN AUTO: 28.2 PG (ref 26.8–34.3)
MCHC RBC AUTO-ENTMCNC: 30.3 G/DL (ref 31.4–37.4)
MCV RBC AUTO: 93 FL (ref 82–98)
MONOCYTES # BLD AUTO: 0.78 THOUSAND/ΜL (ref 0.17–1.22)
MONOCYTES NFR BLD AUTO: 9 % (ref 4–12)
MUCOUS THREADS UR QL AUTO: ABNORMAL
NEUTROPHILS # BLD AUTO: 6.98 THOUSANDS/ΜL (ref 1.85–7.62)
NEUTS SEG NFR BLD AUTO: 75 % (ref 43–75)
NITRITE UR QL STRIP: NEGATIVE
NON-SQ EPI CELLS URNS QL MICRO: ABNORMAL /HPF
NRBC BLD AUTO-RTO: 0 /100 WBCS
PH UR STRIP.AUTO: 7.5 [PH]
PHOSPHATE SERPL-MCNC: 5.6 MG/DL (ref 2.3–4.1)
PLATELET # BLD AUTO: 267 THOUSANDS/UL (ref 149–390)
PMV BLD AUTO: 10.9 FL (ref 8.9–12.7)
POTASSIUM SERPL-SCNC: 3.5 MMOL/L (ref 3.5–5.3)
PROT UR STRIP-MCNC: ABNORMAL MG/DL
PTH-INTACT SERPL-MCNC: 502.3 PG/ML (ref 12–88)
RBC # BLD AUTO: 3.3 MILLION/UL (ref 3.81–5.12)
RBC #/AREA URNS AUTO: ABNORMAL /HPF
SODIUM SERPL-SCNC: 142 MMOL/L (ref 135–147)
SP GR UR STRIP.AUTO: 1.01 (ref 1–1.03)
UROBILINOGEN UR STRIP-ACNC: <2 MG/DL
WBC # BLD AUTO: 9.14 THOUSAND/UL (ref 4.31–10.16)
WBC #/AREA URNS AUTO: ABNORMAL /HPF

## 2025-02-10 PROCEDURE — 82570 ASSAY OF URINE CREATININE: CPT

## 2025-02-10 PROCEDURE — 36415 COLL VENOUS BLD VENIPUNCTURE: CPT

## 2025-02-10 PROCEDURE — 82043 UR ALBUMIN QUANTITATIVE: CPT

## 2025-02-10 PROCEDURE — 80069 RENAL FUNCTION PANEL: CPT

## 2025-02-10 PROCEDURE — 84156 ASSAY OF PROTEIN URINE: CPT

## 2025-02-10 PROCEDURE — 83970 ASSAY OF PARATHORMONE: CPT

## 2025-02-11 ENCOUNTER — RESULTS FOLLOW-UP (OUTPATIENT)
Dept: OTHER | Facility: HOSPITAL | Age: 70
End: 2025-02-11

## 2025-02-11 ENCOUNTER — RESULTS FOLLOW-UP (OUTPATIENT)
Dept: UROLOGY | Facility: CLINIC | Age: 70
End: 2025-02-11

## 2025-02-11 LAB
BACTERIA UR CULT: NORMAL
CREAT UR-MCNC: 35.8 MG/DL
CREAT UR-MCNC: 35.8 MG/DL
MICROALBUMIN UR-MCNC: 197.2 MG/L
MICROALBUMIN/CREAT 24H UR: 551 MG/G CREATININE (ref 0–30)
PROT UR-MCNC: 94.8 MG/DL
PROT/CREAT UR: 2.6 MG/G{CREAT} (ref 0–0.1)

## 2025-02-11 NOTE — TELEPHONE ENCOUNTER
----- Message from RAFITA Breaux sent at 2/11/2025  3:09 PM EST -----  Urine culture shows mixed contaminants. No encounter why urine testing was completed. Can we follow up with patient to see if symptomatic ? She does have left ureteral stent and right nephrostomy tube- UA looks infected.

## 2025-02-11 NOTE — TELEPHONE ENCOUNTER
Called patient and informed her that her urine culture came back showing mixed contaminants. Patient stated that she is not currently having any symptoms of a UTI and that she had urine testing done for her nephrology appointment. No further questions or concerns at this time.

## 2025-02-17 ENCOUNTER — TELEPHONE (OUTPATIENT)
Dept: NEPHROLOGY | Facility: CLINIC | Age: 70
End: 2025-02-17

## 2025-02-17 NOTE — TELEPHONE ENCOUNTER
Talked with pt.  She is feeling fine.  Has no diarrhea.  She was however without her sodium bicarb for about a week (her mother had been ill and things were a bit chaotic)She has started them up again and is faithfully taking as  prescribed.      ----- Message from Wade Villalobos MD sent at 2/17/2025 11:06 AM EST -----  Regarding: Lab message  Patient has not reviewed my message.  Can you please call her to review this message.    Cecelia Eid, Hope you are feeling well.  Kidney function is looking stable.  Serum bicarbonate level is mildly low at 20.  I have 2 questions #1 are you having any diarrhea?  #2 are you taking sodium bicarbonate tablets?  Let me know.  Thank you

## 2025-02-25 ENCOUNTER — OFFICE VISIT (OUTPATIENT)
Dept: NEPHROLOGY | Facility: CLINIC | Age: 70
End: 2025-02-25
Payer: COMMERCIAL

## 2025-02-25 VITALS
HEART RATE: 68 BPM | SYSTOLIC BLOOD PRESSURE: 122 MMHG | HEIGHT: 57 IN | WEIGHT: 154 LBS | OXYGEN SATURATION: 99 % | DIASTOLIC BLOOD PRESSURE: 80 MMHG | BODY MASS INDEX: 33.22 KG/M2

## 2025-02-25 DIAGNOSIS — E83.39 HYPERPHOSPHATEMIA: ICD-10-CM

## 2025-02-25 DIAGNOSIS — N25.81 SECONDARY HYPERPARATHYROIDISM OF RENAL ORIGIN (HCC): ICD-10-CM

## 2025-02-25 DIAGNOSIS — N18.4 STAGE 4 CHRONIC KIDNEY DISEASE (HCC): Primary | ICD-10-CM

## 2025-02-25 DIAGNOSIS — D63.1 ANEMIA IN STAGE 4 CHRONIC KIDNEY DISEASE  (HCC): ICD-10-CM

## 2025-02-25 DIAGNOSIS — R80.9 NON-NEPHROTIC RANGE PROTEINURIA: ICD-10-CM

## 2025-02-25 DIAGNOSIS — N18.4 ANEMIA IN STAGE 4 CHRONIC KIDNEY DISEASE  (HCC): ICD-10-CM

## 2025-02-25 DIAGNOSIS — Z86.39 HISTORY OF METABOLIC ACIDOSIS: ICD-10-CM

## 2025-02-25 PROCEDURE — 99214 OFFICE O/P EST MOD 30 MIN: CPT | Performed by: INTERNAL MEDICINE

## 2025-02-25 RX ORDER — CALCIUM ACETATE 667 MG/1
1334 CAPSULE ORAL 2 TIMES DAILY
Qty: 180 CAPSULE | Refills: 3 | Status: SHIPPED | OUTPATIENT
Start: 2025-02-25 | End: 2026-02-20

## 2025-02-25 RX ORDER — CALCITRIOL 0.25 UG/1
0.25 CAPSULE, LIQUID FILLED ORAL DAILY
Qty: 90 CAPSULE | Refills: 3 | Status: SHIPPED | OUTPATIENT
Start: 2025-02-25 | End: 2025-05-26

## 2025-02-25 NOTE — PROGRESS NOTES
Name: Ragini Melendez      : 1955      MRN: [id number]  Encounter Provider: Wade Villalobos MD  Encounter Date: 2025   Encounter department: [facility]  :  Assessment & Plan  Stage 4 chronic kidney disease (HCC)  - Etiology/risk factors: Hypertension, obstructive uropathy in setting of bilateral ureteral strictures, underlying glomerular pathology, age-related nephron loss   - Baseline Cr: 0.9-1.1 until , then had several episodes of MONROE due to obstructive uropathy in setting of bilateral distal ureteral strictures and currently in the range of 2.0-2.5, most recently 1.77 2025  - K KEVIN 2-year 6.9%, 5-year 19.9% as of 2025  - CKD modality education completed, patient is interested in peritoneal dialysis  - Once GFR persistently less than 15, we will make referral to see general surgery for candidacy of peritoneal dialysis catheter placement (this may be challenging due to previous history of abdominal surgery and radiation therapy which can increase the risk of adhesions and can make peritoneal dialysis difficult to perform).  - She also sees renal transplant team and will be eligible for renal transplant once GFR persistently less than 20 mL/min  Non-nephrotic range proteinuria  >>Workup   - Urine albumin to creatinine ratio 150 mg/g in 2017  - Urine protein to creatinine ratio almost 1 g in 2020 before initiating Avastin  - Urine protein to creatinine ratio started rising in 2022 and subsequently Avastin was held after last dose on 2022  - Urine protein to creatinine ratio peaked at 17 g in 2023  - Urine protein to creatinine ratio improved to 750 mg 2024, increased again to 2.6 2025 (in setting of bacteriuria)  - Serum albumin improved, recently 3.8 2025  - No clinical evidence of nephrotic syndrome  - HbA1c 5.2, MARY ANNE 2 R negative   - CHERI 1:80, dsDNA negative, no hypocomplementemia (no clinical evidence of lupus)  - Hep B/hep C/HIV  PATIENT GIVEN SURGICAL SITE INFECTION FAQ HANDOUT AND HAND WASHING TIP SHEET. PREOP INSTRUCTIONS REVIEWED AND PATIENT VERBALIZES UNDERSTANDING OF INSTRUCTIONS. PATIENT HAS BEEN GIVEN THE OPPORTUNITY TO ASK ADDITIONAL QUESTIONS.      SENT PATIENT TO [facility] FOR PREOP CXR    HISTORY OF COVID 19 IN EARLY December 2022- SURGEON AWARE negative  - Rheumatoid factor negative, cryoglobulin not detected  - Immunofixation showed polyclonal peak in IgG and IgA, serum free light chain ratio 1.8  - Worsening of proteinuria was related to use of VEGF inhibitor (based on literature review proteinuria can continue for several months even after stopping the medication) but she can have baseline glomerular pathology as proteinuria dates back to 2017  - Differential diagnosis includes proteinuria secondary to VEGF inhibitor versus membranous nephropathy in setting of malignancy (however malignancy is in remission) versus adaptive FSGS versus minimal-change disease   - No plans for kidney biopsy at this time as both kidneys are compromised  - She has not tolerated RAAS inhibition due to low blood pressure   - Avastin has been on hold and there are no plans to restart the drug at this time  - She follows with hematology regarding biclonal gammopathy and no plans noted for bone marrow biopsy given normal total protein, albumin and calcium  History of metabolic acidosis  - Etiology: CKD +/- ileal conduit  - Goal serum bicarbonate level 24-26  - Most recent serum bicarbonate level 20 02/2025  - Continue sodium bicarbonate 650 mg 3 times daily  Anemia in stage 4 chronic kidney disease  (HCC)  - Target Hb: More than 10 g/dL  - Most recent hemoglobin: 9.3 g/dL 02/2025  - Ferritin 727, iron saturation 41 04/2024 suggestive of adequate iron stores  - Check iron panel now to rule out iron deficiency  - No KENNEY given malignancy  - Defer management of anemia to hematology  Secondary hyperparathyroidism of renal origin (HCC)  - Goal Ca 8.5-10 mg/dL, goal Phos 2.7-4.6 mg/dL, goal iPTH 30-70 pg/mL  - Secondary hyperparathyroidism most likely in setting of chronic kidney disease and previous vitamin D insufficiency  -  02/2025 increased from 453 02/2024 increased from 401 11/2023 increased from 246 and 07/2023  - 25 hydroxy vitamin D level 48.2 01/2024 on current dose of  cholecalciferol  - Serum phosphorus level 5.6, calcium level 7.2 (status post Prolia injection 11/2024) 02/2025, on PhosLo 1 tablet 3 times daily with meals  - Current Rx: Calcitriol 0.25 mcg 3 times per week, PhosLo 1 tablet 3 times daily with meals, over-the-counter calcium supplement  - Changes: Increase calcitriol to 0.25 mcg 3 times per week, increase PhosLo to 2 capsules twice daily  Hyperphosphatemia  - As above  - Monitor phosphorus level as we are increasing the dose of calcitriol  - Dose of phosphorus binders increased as above  - Low phosphorus diet discussed and educational material provided      History of Present Illness   HPI  Ragini Melendez is a 69 y.o. female     Since last visit: She has undergone ileal conduit placement.  Left nephrostomy tube has been internalized.  Right nephrostomy tube remains in position.  She denies dyspnea.  She denies leg swelling.      Ragini Melendez is a 69 y.o. female who has history of endometrial cancer status post total abdominal hysterectomy and bilateral salpingo-oophorectomy, status post chemotherapy with Taxol and carboplatin in 2019, currently onrucaparib, atezolizumab, and bevacizumab.  Bevacizumab was held on last 2 cycles due to worsening proteinuria.  She has history of obesity for which he underwent gastric bypass surgery in 2001.  She gained weight and was requiring metformin for prediabetes and quinapril for hypertension before she was diagnosed with endometrial cancer.  Since diagnosis of endometrial cancer she has lost significant amount of weight; anti diabetic and antihypertensive medications were stopped.       >> Major risk factors for CKD  - Diabetes: Previously pre-diabetic on Metformin  - Hypertension: Previous history of hypertension but not on any medications   - Age >= 55 years: Yes   - Family history of kidney disease: Father had kidney stones, his cousin was on dialysis   - Obesity or metabolic syndrome: Yes      >> Medical history  evaluation   - Prior kidney disease or dialysis: No  - Incidental hematuria in the past: Yes with ureteral stent in place   - Urinary symptoms: Urinary incontinence   - History of foamy or frothy urine: No   - History of nephrolithiasis: Yes but never passed a stone   - Diseases that share risk factors with CKD: DM, HTN  - Systemic diseases that might affect kidney: No   - History of use of medications that might affect renal function: No    History obtained from: patient    Review of Systems   Constitutional:  Negative for chills and fever.   HENT:  Negative for ear pain and sore throat.    Eyes:  Negative for pain and visual disturbance.   Respiratory:  Negative for cough and shortness of breath.    Cardiovascular:  Negative for chest pain and palpitations.   Gastrointestinal:  Negative for abdominal pain and vomiting.   Genitourinary:  Negative for dysuria and hematuria.   Musculoskeletal:  Negative for arthralgias and back pain.   Skin:  Negative for color change and rash.   Neurological:  Negative for seizures and syncope.   All other systems reviewed and are negative.    Past Medical History   Past Medical History:   Diagnosis Date    Anemia     Anxiety     Cancer (HCC)     ENDOMETRIAL    Chemotherapy induced neutropenia (HCC) 08/30/2019    Chronic kidney disease     CKD (chronic kidney disease) stage 3, GFR 30-59 ml/min (HCC)     COVID     Fall 2022    Depression     DVT (deep venous thrombosis) (HCC) 07/19/2019    RIGHT LEG    Endometrial cancer (HCC) 12/2017    GERD (gastroesophageal reflux disease)     H/O gastric bypass     Hard to intubate     pt denies AS OF 7/25/23    History of chemotherapy     started 12/2021endometrial cancer- done 12/23/22    History of DVT in adulthood     RLE    History of transfusion 04/13/2023    Hyperglycemia     vx type 2 dm -- last assessed 4/1/14; resolved 11/7/17    Hyperlipidemia     Hypertension     in past- no meds at present    Iron deficiency anemia     iron infusions  weekly    Iron deficiency anemia secondary to inadequate dietary iron intake 02/08/2023    OAB (overactive bladder)     Port-A-Cath in place     Wears glasses      Past Surgical History:   Procedure Laterality Date    ABDOMINAL SURGERY      GASTRIC BYPASS; 2001    BREAST BIOPSY Right 06/28/2019    core biopsy; benign    CHOLECYSTECTOMY      at the time of gastric bypass    COLONOSCOPY      CT NEEDLE BIOPSY LYMPH NODE  07/08/2019    CYSTECTOMY, RADICAL WITH ILEOCONDUIT N/A 10/24/2024    Procedure: ILEAL CONDUIT urinary diversion (No cystectomy), BILATERAL UTERO-LYSIS;  Surgeon: Demetrius Lowe MD;  Location: [facility];  Service: Urology    FL GUIDED CENTRAL VENOUS ACCESS DEVICE INSERTION  11/12/2019    FL RETROGRADE PYELOGRAM  03/30/2023    FL RETROGRADE PYELOGRAM  05/09/2023    FL RETROGRADE PYELOGRAM  8/1/2023    GASTRIC BYPASS      HYSTERECTOMY Bilateral 2017    total abdominal with salpingo-oophorectomy    IR NEPHROSTOMY TO NEPHROURETERAL STENT  06/09/2022    IR NEPHROSTOMY TO NEPHROURETERAL STENT  12/20/2022    IR NEPHROSTOMY TO NEPHROURETERAL STENT  8/8/2023    IR NEPHROSTOMY TO NEPHROURETERAL STENT  12/4/2024    IR NEPHROSTOMY TUBE CHECK/CHANGE/REPOSITION/REINSERTION/UPSIZE  05/27/2022    IR NEPHROSTOMY TUBE CHECK/CHANGE/REPOSITION/REINSERTION/UPSIZE  11/10/2023    IR NEPHROSTOMY TUBE CHECK/CHANGE/REPOSITION/REINSERTION/UPSIZE  2/9/2024    IR NEPHROSTOMY TUBE CHECK/CHANGE/REPOSITION/REINSERTION/UPSIZE  4/12/2024    IR NEPHROSTOMY TUBE CHECK/CHANGE/REPOSITION/REINSERTION/UPSIZE  7/12/2024    IR NEPHROSTOMY TUBE CHECK/CHANGE/REPOSITION/REINSERTION/UPSIZE  10/16/2024    IR NEPHROSTOMY TUBE CHECK/CHANGE/REPOSITION/REINSERTION/UPSIZE  11/6/2024    IR NEPHROSTOMY TUBE PLACEMENT  07/26/2019    IR NEPHROSTOMY TUBE PLACEMENT  05/27/2023    IR NEPHROURETERAL STENT CHECK/CHANGE/REPOSITION  04/06/2021    IR NEPHROURETERAL STENT CHECK/CHANGE/REPOSITION  06/04/2021    IR NEPHROURETERAL STENT CHECK/CHANGE/REPOSITION   09/03/2021    IR NEPHROURETERAL STENT CHECK/CHANGE/REPOSITION  12/07/2021    IR NEPHROURETERAL STENT CHECK/CHANGE/REPOSITION  03/08/2022    IR NEPHROURETERAL STENT CHECK/CHANGE/REPOSITION  05/10/2022    IR NEPHROURETERAL STENT CHECK/CHANGE/REPOSITION  09/19/2022    IR PICC LINE  09/27/2019    IR PORT PLACEMENT  07/26/2019    IR PORT PLACEMENT      IR PORT REMOVAL  09/20/2019    OOPHORECTOMY Bilateral 2017    MT CYSTO W/INSERT URETERAL STENT Right 03/30/2023    Procedure: EXCHANGE STENT URETERAL;  Surgeon: Demetrius Lowe MD;  [facility];  Service: Urology    MT CYSTOURETHROSCOPY W/URETERAL CATHETERIZATION Right 03/30/2023    Procedure: CYSTOSCOPY RETROGRADE PYELOGRAM WITH exchange of STENT URETERAL;  Surgeon: Demetrius Lowe MD;  [facility];  Service: Urology    MT CYSTOURETHROSCOPY W/URETERAL CATHETERIZATION Bilateral 05/09/2023    Procedure: CYSTOSCOPY, BILATERAL RETROGRADE PYELOGRAM, WITH LEFT INSERTION STENT URETERAL, RIGHT URTERAL STENT EXCHANGE;  Surgeon: Nicola Hannah MD;  [facility];  Service: Urology    MT CYSTOURETHROSCOPY W/URETERAL CATHETERIZATION Bilateral 8/1/2023    Procedure: CYSTOSCOPY BILATERAL RETROGRADE  WITH REMOVAL BILATERAL  STENT URETERAL;  Surgeon: Demetrius Lowe MD;  [facility];  Service: Urology    MT INSJ TUNNELED CTR VAD W/SUBQ PORT AGE 7 YR/> Left 11/12/2019    Procedure: INSERTION VENOUS PORT ( PORT-A-CATH) IR;  Surgeon: Edilberto Guzman DO;  [facility];  Service: Interventional Radiology    MT LAPS ABD PRTM&OMENTUM DX W/WO SPEC BR/WA SPX N/A 12/19/2017    Procedure: LAPAROSCOPY DIAGNOSTIC;  Surgeon: Evgeny So MD;  [facility];  Service: Gynecology Oncology    MT LAPS W/RAD HYST W/BILAT LMPHADEC RMVL TUBE/OVARY N/A 12/19/2017    Procedure: HYSTERECTOMY LAPAROSCOPIC TOTAL (LTH) W/ ROBOTICS; BILATERAL SALPINGOOPHERECTOMY; LYMPH NODE DISSECTION; lysis of adhesions;  Surgeon: Evgeny So  MD;  [facility];  Service: Gynecology Oncology    REMOVAL URETERAL STENT Bilateral 8/1/2023    Procedure: REMOVAL STENT URETERAL;  Surgeon: Demetrius Lowe MD;  [facility];  Service: Urology    TONSILLECTOMY      US GUIDED BREAST BIOPSY RIGHT COMPLETE Right 06/28/2019     Family History   Problem Relation Age of Onset    Hyperlipidemia Mother     Heart disease Mother     Ovarian cancer Mother 50    Colon cancer Mother     Lymphoma Father     Breast cancer Sister 63    No Known Problems Brother     No Known Problems Brother     No Known Problems Maternal Grandmother     Bone cancer Maternal Grandfather     Uterine cancer Paternal Grandmother     No Known Problems Paternal Grandfather     No Known Problems Maternal Aunt     No Known Problems Maternal Aunt     No Known Problems Maternal Aunt     No Known Problems Maternal Aunt     No Known Problems Paternal Aunt     No Known Problems Paternal Aunt     No Known Problems Paternal Aunt     No Known Problems Paternal Aunt       reports that she has never smoked. She has never used smokeless tobacco. She reports that she does not drink alcohol and does not use drugs.  Current Outpatient Medications   Medication Instructions    acetaminophen (TYLENOL) 650 mg, Oral, Every 6 hours PRN    ARIPiprazole (ABILIFY) 20 mg, Every morning    BD PosiFlush 0.9 % SOLN No dose, route, or frequency recorded.    calcitriol (ROCALTROL) 0.25 mcg, Oral, Daily    calcium acetate (PHOSLO) 1,334 mg, Oral, 2 times daily, 2 capsules with lunch, 2 capsules with dinner    Calcium-Magnesium-Vitamin D (CALCIUM 500 PO) 1 capsule, Daily at bedtime    cetirizine (ZYRTEC) 10 mg, Oral, Daily    cholecalciferol (VITAMIN D3) 4,000 Units, Oral, Daily    Cyanocobalamin (VITAMIN B12 PO) 1 capsule, Daily after lunch    docusate sodium (COLACE) 100 mg, Oral, 2 times daily    enoxaparin (LOVENOX) 1 mg/kg, Subcutaneous, Every 24 hours    fluticasone (FLONASE) 50 mcg/act nasal spray 1  "spray, Nasal, Daily    folic acid (FOLVITE) 1 mg, Oral, Daily    furosemide (LASIX) 20 mg, Oral, As needed    methocarbamol (ROBAXIN) 750 mg, Oral, Every 6 hours scheduled    Multiple Vitamin (MULTIVITAMIN) tablet 1 tablet, Every morning    naloxone (NARCAN) 4 mg/0.1 mL nasal spray Administer 1 spray into a nostril. If no response after 2-3 minutes, give another dose in the other nostril using a new spray.    omeprazole (PRILOSEC) 20 mg, As needed    oxyCODONE-acetaminophen (Percocet) 5-325 mg per tablet 1 tablet, Oral, Every 4 hours PRN    polyethylene glycol (MIRALAX) 17 g, Oral, Daily    sodium bicarbonate 650 mg, Oral, 3 times daily    SODIUM CHLORIDE, EXTERNAL, 0.9 % SOLN 10 mL, Intracatheter, Daily, Intracatheter flushing daily. May substitute prefilled syringe with normal saline 10 mL vials, 10 mL syringes, and 18 g blunt needles    sodium chloride, PF, 0.9 % 10 mL, Intracatheter, Daily, Intracatheter flushing daily. May substitute prefilled syringe with normal saline 10 mL vials, 10 mL syringes, and 18 g blunt needles    sodium chloride, PF, 0.9 % 10 mL, Intracatheter, Daily, Intracatheter flushing daily. May substitute prefilled syringe with normal saline 10 mL vials, 10 mL syringes, and 18 g blunt needles    venlafaxine (EFFEXOR-XR) 75 mg 24 hr capsule TAKE 3 CAPS DAILY     Allergies   Allergen Reactions    Cephalosporins Rash     Which Cephalosporin reaction was to not specified; however, has tolerated Amoxicillin, Cefazolin, and Cefepime         Objective   /80 (BP Location: Left arm, Patient Position: Sitting, Cuff Size: Standard)   Pulse 68   Ht 4' 9\" (1.448 m)   Wt 69.9 kg (154 lb)   LMP  (LMP Unknown)   SpO2 99%   BMI 33.33 kg/m²      Physical Exam  Vitals and nursing note reviewed.   Constitutional:       General: She is not in acute distress.     Appearance: She is well-developed.   HENT:      Head: Normocephalic and atraumatic.   Eyes:      Conjunctiva/sclera: Conjunctivae normal. "   Cardiovascular:      Rate and Rhythm: Normal rate and regular rhythm.      Heart sounds: No murmur heard.  Pulmonary:      Effort: Pulmonary effort is normal. No respiratory distress.      Breath sounds: Normal breath sounds.   Abdominal:      Tenderness: There is no right CVA tenderness or left CVA tenderness.   Musculoskeletal:         General: No swelling.      Cervical back: Neck supple.      Right lower leg: No edema.      Left lower leg: No edema.   Skin:     General: Skin is warm and dry.      Capillary Refill: Capillary refill takes less than 2 seconds.   Neurological:      Mental Status: She is alert.   Psychiatric:         Mood and Affect: Mood normal.

## 2025-02-25 NOTE — PATIENT INSTRUCTIONS
Kidney function is currently at stage IV kidney disease.    Blood pressure is controlled.    Calcium level is low and parathyroid hormone level is elevated and would recommend increasing calcitriol to 0.25 mcg on a daily basis.    Phosphorus level is elevated and would recommend increasing PhosLo to 2 capsules with lunch and close with dinner.    After making above changes, check renal panel in 1 month.    Check full panel of lab testing in 6 months to follow-up in this in 6 months.

## 2025-02-28 ENCOUNTER — TELEPHONE (OUTPATIENT)
Age: 70
End: 2025-02-28

## 2025-02-28 ENCOUNTER — DOCUMENTATION (OUTPATIENT)
Dept: NEPHROLOGY | Facility: CLINIC | Age: 70
End: 2025-02-28

## 2025-02-28 NOTE — TELEPHONE ENCOUNTER
Pt called reporting that she needed to follow up with the provider regarding her calcium dose. Pt reports that she is taking OTC calcium 500 mg once daily.

## 2025-03-06 ENCOUNTER — HOSPITAL ENCOUNTER (OUTPATIENT)
Dept: RADIOLOGY | Facility: HOSPITAL | Age: 70
Discharge: HOME | End: 2025-03-06
Attending: RADIOLOGY
Payer: COMMERCIAL

## 2025-03-06 DIAGNOSIS — N13.5 BILATERAL URETERAL OBSTRUCTION: Primary | ICD-10-CM

## 2025-03-06 PROCEDURE — 50688 CHANGE OF URETER TUBE/STENT: CPT | Performed by: RADIOLOGY

## 2025-03-06 PROCEDURE — 75984 XRAY CONTROL CATHETER CHANGE: CPT | Performed by: RADIOLOGY

## 2025-03-06 PROCEDURE — C1729 CATH, DRAINAGE: HCPCS

## 2025-03-06 PROCEDURE — 50688 CHANGE OF URETER TUBE/STENT: CPT

## 2025-03-06 PROCEDURE — C1769 GUIDE WIRE: HCPCS

## 2025-03-06 PROCEDURE — 50435 EXCHANGE NEPHROSTOMY CATH: CPT | Performed by: RADIOLOGY

## 2025-03-06 PROCEDURE — 50435 EXCHANGE NEPHROSTOMY CATH: CPT

## 2025-03-06 RX ORDER — LIDOCAINE WITH 8.4% SOD BICARB 0.9%(10ML)
SYRINGE (ML) INJECTION AS NEEDED
Status: COMPLETED | OUTPATIENT
Start: 2025-03-06 | End: 2025-03-06

## 2025-03-06 RX ADMIN — Medication 5 ML: at 09:13

## 2025-03-06 RX ADMIN — IOHEXOL 20 ML: 350 INJECTION, SOLUTION INTRAVENOUS at 09:30

## 2025-03-06 NOTE — DISCHARGE INSTRUCTIONS
Nephrostomy Tube Care     WHAT YOU NEED TO KNOW:   A nephrostomy tube is a catheter (thin plastic tube) that is inserted through your skin and into your kidney. The nephrostomy tube drains urine from your kidney into a collecting bag outside your body. You may need a nephrostomy tube when something is blocking the normal flow of urine. A nephrostomy tube may be used for a short or a long period of time. The nephrostomy tube comes out of your back, so you will need someone to help care for your nephrostomy tube.          DISCHARGE INSTRUCTIONS:      How to clean the skin around the nephrostomy tube and change the bandage:  Since the nephrostomy tube comes out of your back, you will not be able to care for it by yourself. Ask someone to follow the general directions below to check and care for your nephrostomy tube.   Gather the items you will need.          Disposable (single use) under-pad, and a clean washcloth  Plain soap, warm water, and new medical gloves  Sterile gauze bandages  Clear adhesive dressing or medical tape  Skin barrier  Protective skin film  Trash bag  Remove the old bandage, and check the tube entry site.    Have the patient lie on his side with the nephrostomy tube entry site facing up. Place the under-pad where it will catch drainage as you are working with the nephrostomy tube.   Wash your hands with soap and water. Put on new medical gloves.  Gently remove the old bandage, without pulling on the tube. Do this by holding the skin beside the tube with one hand. With the other hand, gently remove sticky tape and the skin barrier by pulling in the same direction as hair growth. Do not touch the side of the bandage that is placed over or around the tube. Throw the bandage and skin barrier away in a trash bag.  Look for signs of infection, such as skin redness and swelling. Report any skin changes to healthcare providers.  Clean the tube entry site.    Hold the tube in place to keep it from  being pulled out while you are cleaning around it.  You will need to clean the area twice. For the first cleaning, wet a new gauze bandage with soap and water.  Begin at the entry site of the tube. Wipe the skin in circles, moving away from the entry site. Remove blood and any other material with the gauze. Do this as often as needed. Use a new gauze bandage each time you clean the area, moving away from the entry site.   For the second cleaning, wet a new gauze bandage with water. Begin at the entry site of the tube. Wipe the skin in circles, moving away from the entry site. Use a new gauze bandage each time you clean the area, moving away from the entry site.   Gently pat the skin with a clean washcloth to dry it.    Apply the skin barrier and bandages.    Roll up a bandage to make it thick, and place it under  the place where the tube enters the skin. Place it to support the tube, and stop it from kinking or bending. Tape the bandage in place, and apply more bandages if directed by a healthcare provider.   Bring the tubing forward to the front and tape it to the skin. Do not stretch the tube tight, because this may pull the nephrostomy tube out.  How often to change the bandage.  Change the bandage around the tube, every other day. If your bandages  get dirty or wet, change them right away, and as often as needed. If your nephrostomy tube is to be used for a long period of time, the tube needs to be changed every 2 to 3 months. Healthcare providers will tell you when you need to make an appointment to have your tube changed.     How to care for the urine drainage bag:   Ask if you need to measure and write down how much urine is in the bag before you empty it. Drain urine out of the drainage bag when it is ½ to ? full. Open the spout at the bottom of the bag to empty the urine into the toilet.   You may need to detach the drainage bag from the nephrostomy tube to change it.. If so, attach a new drainage bag  tightly to the nephrostomy tube.     How to prevent problems with your nephrostomy tube:   Change bandages, directed.  This helps to prevent infection. Throw away or clean your drainage bag as directed by your healthcare provider.    Wipe the connecting ends of the drainage bag with alcohol before you reconnect the bag to the tube.  This helps prevent infection.     Keep the tube taped to your skin and connected to a drainage bag placed below the level of your kidneys.  This helps prevent urine from backing up into your kidneys. You may wear a small drainage bag strapped to your leg to let you move around more easily.    Check the catheter to be sure it is in place after you change your clothes or do other activities.  Do not wear tight clothing over the tube. Place the tubing over your thigh rather than under it when you are sitting down. Be sure that nothing is pulling on the nephrostomy tube when you move around.    Change positions if you see little or no urine in your drainage bag.  Check to see if the urine tube is twisted or bent. Be sure that you are not sitting or lying on the tube. If there are no kinks and there is little or no urine in the drainage bag, tell your healthcare provider.    Flush out the tube as directed. Some tubes get flushed one time a day with 10 mls of NSS You will be given a prescription for the flushes.  To flush the nephrostomy tube, clean both connections with alcohol swap. Twist off the drainage bag tube and twist the saline syringe into the nephrostomy tube and flush briskly. Remove the syringe and twist the drainage bag tube back into the nephrostomy tube.  Keep the site covered while you shower.  Tape a piece of clear adhesive plastic over the dressing to keep it dry while you shower. Do not take tub baths.    Contact Interventional Radiology at [phone] (MACIEJ PATIENTS: Contact Interventional Radiology at [phone]) (MICHAEL PATIENTS: Contact Interventional Radiology at  [phone]) if:  The skin around the nephrostomy tube is red, swollen, itches, or has a rash.   You have a fever greater than 101 or chills.  You have lower back or hip pain.  There are changes in how your urine looks or smells.  You have little or no urine draining from the nephrostomy tube.   You have nausea and are vomiting.  The black bernardo on your tube has moved, or the tube is longer than when it was put in.   You have questions or concerns about your condition or care.  The nephrostomy tube comes out completely.   There is blood, pus, or a bad smell coming from the place where the tube enters your skin.  Urine is leaking around the tube.        The following pharmacies carry the flush syringes.       [facility]                     [address]                     [address] [facility]                    [phone]  [place]                       [place]  Phone [phone]            Phone [phone]                 [facility]                                                                                                   [phone]  [facility]             [facility]                             [address] [place], [place] [address] [facility]   Luis SANCHEZ                                 [phone]  Phone [phone]            Phone [phone]    [facility]                                                                         [facility] [phone]  Samuel Luz.  [place]   Phone [phone]

## 2025-03-07 ENCOUNTER — HOSPITAL ENCOUNTER (OUTPATIENT)
Dept: INFUSION CENTER | Facility: CLINIC | Age: 70
Discharge: HOME | End: 2025-03-07
Payer: COMMERCIAL

## 2025-03-07 DIAGNOSIS — C54.1 ENDOMETRIAL CANCER (HCC): ICD-10-CM

## 2025-03-07 DIAGNOSIS — Z45.2 ENCOUNTER FOR CENTRAL LINE CARE: Primary | ICD-10-CM

## 2025-03-07 PROCEDURE — 96523 IRRIG DRUG DELIVERY DEVICE: CPT

## 2025-03-07 NOTE — PROGRESS NOTES
Pt to clinic for port flush. Pt tolerated without complications. Next appointment April 25 at 1:00

## 2025-03-13 ENCOUNTER — OFFICE VISIT (OUTPATIENT)
Dept: RHEUMATOLOGY | Facility: CLINIC | Age: 70
End: 2025-03-13
Payer: COMMERCIAL

## 2025-03-13 VITALS
BODY MASS INDEX: 32.79 KG/M2 | WEIGHT: 152 LBS | DIASTOLIC BLOOD PRESSURE: 82 MMHG | HEIGHT: 57 IN | SYSTOLIC BLOOD PRESSURE: 126 MMHG

## 2025-03-13 DIAGNOSIS — M80.00XS AGE-RELATED OSTEOPOROSIS WITH CURRENT PATHOLOGICAL FRACTURE, SEQUELA: Primary | ICD-10-CM

## 2025-03-13 PROCEDURE — 99214 OFFICE O/P EST MOD 30 MIN: CPT | Performed by: STUDENT IN AN ORGANIZED HEALTH CARE EDUCATION/TRAINING PROGRAM

## 2025-03-13 PROCEDURE — G2211 COMPLEX E/M VISIT ADD ON: HCPCS | Performed by: STUDENT IN AN ORGANIZED HEALTH CARE EDUCATION/TRAINING PROGRAM

## 2025-03-13 NOTE — RESTORATIVE TECHNICIAN NOTE
Name: Mahesh Weeks      : 1967      MRN: [id number]  Encounter Provider: Darin Meza MD  Encounter Date: 3/13/2025   Encounter department: [facility]  :  Assessment & Plan  EDY (Restorative Specialist Mobility Note       Activity: Ambulate in mehta, Chair, Ambulate in room, Stand at bedside(Educated pt about activity and left in bed with call bell and tray within reach  with bed alarm armed)     Assistive Device: Front wheel walker(HHA x 2)                [place] 

## 2025-03-13 NOTE — ASSESSMENT & PLAN NOTE
Due for next dose denosumab May 2025    Due for next DXA around Nov/Dec this year    VitD not checked in about a year, Dr. Villalobos already ordered it to get checked

## 2025-03-13 NOTE — PROGRESS NOTES
"Name: Ragini Melendez      : 1955      MRN: [id number]  Encounter Provider: Jordan Alvarado DO  Encounter Date: 3/13/2025   Encounter department: [facility]  :  Assessment & Plan  Age-related osteoporosis with current pathological fracture, sequela  Due for next dose denosumab May 2025    Due for next DXA around Nov/Dec this year    VitD not checked in about a year, Dr. Villalobos already ordered it to get checked         Patient's rheumatologic disease(s) threaten long-term function if not appropriately managed.    History of Present Illness     Calcium dose recently increased by Nephro due to hypocalcemia    No falls, fractures    No hip pain, thigh pain, jaw pain    Had no issues after first Prolia dose    Some leg pain, has been taking care of her mother which involves a lot of bending and such. Was not sudden-onset, no preceding trauma    Permanent history: First saw me 2024 for osteoporosis complicated by R pubic ramus fracture and proximal humerus fracture; endometrial cancer s/p chemo complicated by CKD4, history RLE DVT, history Reclast x1 in . Started Prolia first dose 2024     Objective   /82   Ht 4' 9\" (1.448 m)   Wt 68.9 kg (152 lb)   LMP  (LMP Unknown)   BMI 32.89 kg/m²      General: Well appearing, in no distress.   Eyes: Sclera non-icteric. EOMI  Extremities: Warm, well perfused, no edema.   Neuro: Alert and oriented. No gross focal neurological deficits.   Skin: No rashes.  MSK exam: grossly unremarkable, no synovial hypertrophy or synovitis. Mild tenderness to palpation bilateral thigh musculature  "

## 2025-03-20 ENCOUNTER — RA CDI HCC (OUTPATIENT)
Dept: OTHER | Facility: HOSPITAL | Age: 70
End: 2025-03-20

## 2025-03-27 ENCOUNTER — APPOINTMENT (OUTPATIENT)
Dept: LAB | Facility: CLINIC | Age: 70
End: 2025-03-27
Payer: COMMERCIAL

## 2025-03-27 ENCOUNTER — OFFICE VISIT (OUTPATIENT)
Dept: FAMILY MEDICINE CLINIC | Facility: CLINIC | Age: 70
End: 2025-03-27
Payer: COMMERCIAL

## 2025-03-27 VITALS
HEIGHT: 57 IN | DIASTOLIC BLOOD PRESSURE: 80 MMHG | WEIGHT: 155 LBS | BODY MASS INDEX: 33.44 KG/M2 | SYSTOLIC BLOOD PRESSURE: 132 MMHG

## 2025-03-27 DIAGNOSIS — N25.81 SECONDARY HYPERPARATHYROIDISM OF RENAL ORIGIN (HCC): ICD-10-CM

## 2025-03-27 DIAGNOSIS — D63.1 ANEMIA IN STAGE 4 CHRONIC KIDNEY DISEASE  (HCC): ICD-10-CM

## 2025-03-27 DIAGNOSIS — R10.9 ABDOMINAL CRAMPING: ICD-10-CM

## 2025-03-27 DIAGNOSIS — Z00.00 ENCOUNTER FOR SUBSEQUENT ANNUAL WELLNESS VISIT (AWV) IN MEDICARE PATIENT: Primary | ICD-10-CM

## 2025-03-27 DIAGNOSIS — E83.39 HYPERPHOSPHATEMIA: ICD-10-CM

## 2025-03-27 DIAGNOSIS — N18.4 STAGE 4 CHRONIC KIDNEY DISEASE (HCC): ICD-10-CM

## 2025-03-27 DIAGNOSIS — Z93.6 NEPHROSTOMY STATUS (HCC): ICD-10-CM

## 2025-03-27 DIAGNOSIS — N18.4 ANEMIA IN STAGE 4 CHRONIC KIDNEY DISEASE  (HCC): ICD-10-CM

## 2025-03-27 DIAGNOSIS — Z13.220 SCREENING CHOLESTEROL LEVEL: ICD-10-CM

## 2025-03-27 LAB
ALBUMIN SERPL BCG-MCNC: 4 G/DL (ref 3.5–5)
ANION GAP SERPL CALCULATED.3IONS-SCNC: 12 MMOL/L (ref 4–13)
BUN SERPL-MCNC: 47 MG/DL (ref 5–25)
CALCIUM SERPL-MCNC: 8.9 MG/DL (ref 8.4–10.2)
CHLORIDE SERPL-SCNC: 105 MMOL/L (ref 96–108)
CO2 SERPL-SCNC: 22 MMOL/L (ref 21–32)
CREAT SERPL-MCNC: 2.07 MG/DL (ref 0.6–1.3)
FERRITIN SERPL-MCNC: 485 NG/ML (ref 11–307)
GFR SERPL CREATININE-BSD FRML MDRD: 24 ML/MIN/1.73SQ M
GLUCOSE P FAST SERPL-MCNC: 87 MG/DL (ref 65–99)
IRON SATN MFR SERPL: 31 % (ref 15–50)
IRON SERPL-MCNC: 87 UG/DL (ref 50–212)
PHOSPHATE SERPL-MCNC: 5.2 MG/DL (ref 2.3–4.1)
POTASSIUM SERPL-SCNC: 3.7 MMOL/L (ref 3.5–5.3)
SODIUM SERPL-SCNC: 139 MMOL/L (ref 135–147)
TIBC SERPL-MCNC: 277.2 UG/DL (ref 250–450)
TRANSFERRIN SERPL-MCNC: 198 MG/DL (ref 203–362)
UIBC SERPL-MCNC: 190 UG/DL (ref 155–355)

## 2025-03-27 PROCEDURE — 82728 ASSAY OF FERRITIN: CPT

## 2025-03-27 PROCEDURE — 80069 RENAL FUNCTION PANEL: CPT

## 2025-03-27 PROCEDURE — 83550 IRON BINDING TEST: CPT

## 2025-03-27 PROCEDURE — G0439 PPPS, SUBSEQ VISIT: HCPCS | Performed by: FAMILY MEDICINE

## 2025-03-27 PROCEDURE — 83540 ASSAY OF IRON: CPT

## 2025-03-27 PROCEDURE — 36415 COLL VENOUS BLD VENIPUNCTURE: CPT

## 2025-03-27 NOTE — PATIENT INSTRUCTIONS
Medicare Preventive Visit Patient Instructions  Thank you for completing your Welcome to Medicare Visit or Medicare Annual Wellness Visit today. Your next wellness visit will be due in one year (3/28/2026).  The screening/preventive services that you may require over the next 5-10 years are detailed below. Some tests may not apply to you based off risk factors and/or age. Screening tests ordered at today's visit but not completed yet may show as past due. Also, please note that scanned in results may not display below.  Preventive Screenings:  Service Recommendations Previous Testing/Comments   Colorectal Cancer Screening  * Colonoscopy    * Fecal Occult Blood Test (FOBT)/Fecal Immunochemical Test (FIT)  * Fecal DNA/Cologuard Test  * Flexible Sigmoidoscopy Age: 45-75 years old   Colonoscopy: every 10 years (may be performed more frequently if at higher risk)  OR  FOBT/FIT: every 1 year  OR  Cologuard: every 3 years  OR  Sigmoidoscopy: every 5 years  Screening may be recommended earlier than age 45 if at higher risk for colorectal cancer. Also, an individualized decision between you and your healthcare provider will decide whether screening between the ages of 76-85 would be appropriate. Colonoscopy: 12/12/2023  FOBT/FIT: Not on file  Cologuard: Not on file  Sigmoidoscopy: Not on file    Screening Current  Due for Colonoscopy - High Risk     Breast Cancer Screening Age: 40+ years old  Frequency: every 1-2 years  Not required if history of left and right mastectomy Mammogram: 07/28/2023    Screening Current   Cervical Cancer Screening Between the ages of 21-29, pap smear recommended once every 3 years.   Between the ages of 30-65, can perform pap smear with HPV co-testing every 5 years.   Recommendations may differ for women with a history of total hysterectomy, cervical cancer, or abnormal pap smears in past. Pap Smear: Not on file    Screening Not Indicated   Hepatitis C Screening Once for adults born between 1945  and 1965  More frequently in patients at high risk for Hepatitis C Hep C Antibody: Not on file    Screening Current   Diabetes Screening 1-2 times per year if you're at risk for diabetes or have pre-diabetes Fasting glucose: 96 mg/dL (2/10/2025)  A1C: 5.2 % (10/31/2022)  Screening Current   Cholesterol Screening Once every 5 years if you don't have a lipid disorder. May order more often based on risk factors. Lipid panel: 01/28/2023    Screening Current     Other Preventive Screenings Covered by Medicare:  Abdominal Aortic Aneurysm (AAA) Screening: covered once if your at risk. You're considered to be at risk if you have a family history of AAA.  Lung Cancer Screening: covers low dose CT scan once per year if you meet all of the following conditions: (1) Age 55-77; (2) No signs or symptoms of lung cancer; (3) Current smoker or have quit smoking within the last 15 years; (4) You have a tobacco smoking history of at least 20 pack years (packs per day multiplied by number of years you smoked); (5) You get a written order from a healthcare provider.  Glaucoma Screening: covered annually if you're considered high risk: (1) You have diabetes OR (2) Family history of glaucoma OR (3)  aged 50 and older OR (4)  American aged 65 and older  Osteoporosis Screening: covered every 2 years if you meet one of the following conditions: (1) You're estrogen deficient and at risk for osteoporosis based off medical history and other findings; (2) Have a vertebral abnormality; (3) On glucocorticoid therapy for more than 3 months; (4) Have primary hyperparathyroidism; (5) On osteoporosis medications and need to assess response to drug therapy.   Last bone density test (DXA Scan): 11/29/2023.  HIV Screening: covered annually if you're between the age of 15-65. Also covered annually if you are younger than 15 and older than 65 with risk factors for HIV infection. For pregnant patients, it is covered up to 3 times per  pregnancy.    Immunizations:  Immunization Recommendations   Influenza Vaccine Annual influenza vaccination during flu season is recommended for all persons aged >= 6 months who do not have contraindications   Pneumococcal Vaccine   * Pneumococcal conjugate vaccine = PCV13 (Prevnar 13), PCV15 (Vaxneuvance), PCV20 (Prevnar 20)  * Pneumococcal polysaccharide vaccine = PPSV23 (Pneumovax) Adults 19-63 yo with certain risk factors or if 65+ yo  If never received any pneumonia vaccine: recommend Prevnar 20 (PCV20)  Give PCV20 if previously received 1 dose of PCV13 or PPSV23   Hepatitis B Vaccine 3 dose series if at intermediate or high risk (ex: diabetes, end stage renal disease, liver disease)   Respiratory syncytial virus (RSV) Vaccine - COVERED BY MEDICARE PART D  * RSVPreF3 (Arexvy) CDC recommends that adults 60 years of age and older may receive a single dose of RSV vaccine using shared clinical decision-making (SCDM)   Tetanus (Td) Vaccine - COST NOT COVERED BY MEDICARE PART B Following completion of primary series, a booster dose should be given every 10 years to maintain immunity against tetanus. Td may also be given as tetanus wound prophylaxis.   Tdap Vaccine - COST NOT COVERED BY MEDICARE PART B Recommended at least once for all adults. For pregnant patients, recommended with each pregnancy.   Shingles Vaccine (Shingrix) - COST NOT COVERED BY MEDICARE PART B  2 shot series recommended in those 19 years and older who have or will have weakened immune systems or those 50 years and older     Health Maintenance Due:      Topic Date Due   • Breast Cancer Screening: Mammogram  07/28/2024   • DXA SCAN  11/29/2025   • Hepatitis C Screening  Completed   • Colorectal Cancer Screening  Discontinued     Immunizations Due:      Topic Date Due   • COVID-19 Vaccine (5 - 2024-25 season) 09/01/2024     Advance Directives   What are advance directives?  Advance directives are legal documents that state your wishes and plans for  medical care. These plans are made ahead of time in case you lose your ability to make decisions for yourself. Advance directives can apply to any medical decision, such as the treatments you want, and if you want to donate organs.   What are the types of advance directives?  There are many types of advance directives, and each state has rules about how to use them. You may choose a combination of any of the following:  Living will:  This is a written record of the treatment you want. You can also choose which treatments you do not want, which to limit, and which to stop at a certain time. This includes surgery, medicine, IV fluid, and tube feedings.   Durable power of  for healthcare (DPAHC):  This is a written record that states who you want to make healthcare choices for you when you are unable to make them for yourself. This person, called a proxy, is usually a family member or a friend. You may choose more than 1 proxy.  Do not resuscitate (DNR) order:  A DNR order is used in case your heart stops beating or you stop breathing. It is a request not to have certain forms of treatment, such as CPR. A DNR order may be included in other types of advance directives.  Medical directive:  This covers the care that you want if you are in a coma, near death, or unable to make decisions for yourself. You can list the treatments you want for each condition. Treatment may include pain medicine, surgery, blood transfusions, dialysis, IV or tube feedings, and a ventilator (breathing machine).  Values history:  This document has questions about your views, beliefs, and how you feel and think about life. This information can help others choose the care that you would choose.  Why are advance directives important?  An advance directive helps you control your care. Although spoken wishes may be used, it is better to have your wishes written down. Spoken wishes can be misunderstood, or not followed. Treatments may be given  even if you do not want them. An advance directive may make it easier for your family to make difficult choices about your care.   Urinary Incontinence   Urinary incontinence (UI)  is when you lose control of your bladder. UI develops because your bladder cannot store or empty urine properly. The 3 most common types of UI are stress incontinence, urge incontinence, or both.  Medicines:   May be given to help strengthen your bladder control. Report any side effects of medication to your healthcare provider.  Do pelvic muscle exercises often:  Your pelvic muscles help you stop urinating. Squeeze these muscles tight for 5 seconds, then relax for 5 seconds. Gradually work up to squeezing for 10 seconds. Do 3 sets of 15 repetitions a day, or as directed. This will help strengthen your pelvic muscles and improve bladder control.  Train your bladder:  Go to the bathroom at set times, such as every 2 hours, even if you do not feel the urge to go. You can also try to hold your urine when you feel the urge to go. For example, hold your urine for 5 minutes when you feel the urge to go. As that becomes easier, hold your urine for 10 minutes.   Self-care:   Keep a UI record.  Write down how often you leak urine and how much you leak. Make a note of what you were doing when you leaked urine.  Drink liquids as directed. You may need to limit the amount of liquid you drink to help control your urine leakage. Do not drink any liquid right before you go to bed. Limit or do not have drinks that contain caffeine or alcohol.   Prevent constipation.  Eat a variety of high-fiber foods. Good examples are high-fiber cereals, beans, vegetables, and whole-grain breads. Walking is the best way to trigger your intestines to have a bowel movement.  Exercise regularly and maintain a healthy weight.  Weight loss and exercise will decrease pressure on your bladder and help you control your leakage.   Use a catheter as directed  to help empty your  bladder. A catheter is a tiny, plastic tube that is put into your bladder to drain your urine.   Go to behavior therapy as directed.  Behavior therapy may be used to help you learn to control your urge to urinate.    Weight Management   Why it is important to manage your weight:  Being overweight increases your risk of health conditions such as heart disease, high blood pressure, type 2 diabetes, and certain types of cancer. It can also increase your risk for osteoarthritis, sleep apnea, and other respiratory problems. Aim for a slow, steady weight loss. Even a small amount of weight loss can lower your risk of health problems.  How to lose weight safely:  A safe and healthy way to lose weight is to eat fewer calories and get regular exercise. You can lose up about 1 pound a week by decreasing the number of calories you eat by 500 calories each day.   Healthy meal plan for weight management:  A healthy meal plan includes a variety of foods, contains fewer calories, and helps you stay healthy. A healthy meal plan includes the following:  Eat whole-grain foods more often.  A healthy meal plan should contain fiber. Fiber is the part of grains, fruits, and vegetables that is not broken down by your body. Whole-grain foods are healthy and provide extra fiber in your diet. Some examples of whole-grain foods are whole-wheat breads and pastas, oatmeal, brown rice, and bulgur.  Eat a variety of vegetables every day.  Include dark, leafy greens such as spinach, kale, chalino greens, and mustard greens. Eat yellow and orange vegetables such as carrots, sweet potatoes, and winter squash.   Eat a variety of fruits every day.  Choose fresh or canned fruit (canned in its own juice or light syrup) instead of juice. Fruit juice has very little or no fiber.  Eat low-fat dairy foods.  Drink fat-free (skim) milk or 1% milk. Eat fat-free yogurt and low-fat cottage cheese. Try low-fat cheeses such as mozzarella and other reduced-fat  cheeses.  Choose meat and other protein foods that are low in fat.  Choose beans or other legumes such as split peas or lentils. Choose fish, skinless poultry (chicken or turkey), or lean cuts of red meat (beef or pork). Before you cook meat or poultry, cut off any visible fat.   Use less fat and oil.  Try baking foods instead of frying them. Add less fat, such as margarine, sour cream, regular salad dressing and mayonnaise to foods. Eat fewer high-fat foods. Some examples of high-fat foods include french fries, doughnuts, ice cream, and cakes.  Eat fewer sweets.  Limit foods and drinks that are high in sugar. This includes candy, cookies, regular soda, and sweetened drinks.  Exercise:  Exercise at least 30 minutes per day on most days of the week. Some examples of exercise include walking, biking, dancing, and swimming. You can also fit in more physical activity by taking the stairs instead of the elevator or parking farther away from stores. Ask your healthcare provider about the best exercise plan for you.      © Copyright [employer] 2018 Information is for End User's use only and may not be sold, redistributed or otherwise used for commercial purposes. All illustrations and images included in CareNotes® are the copyrighted property of [employer] Inc[employer] or [employer]

## 2025-03-27 NOTE — PROGRESS NOTES
Name: Ragini Melendez      : 1955      MRN: [id number]  Encounter Provider: Emely Yost DO  Encounter Date: 3/27/2025   Encounter department: [facility]    Assessment & Plan  Encounter for subsequent annual wellness visit (AWV) in Medicare patient  - UTD with PCV20   - UTD with Tdap  - UTD with annual Flu vaccine   - has Mammo scheduled for 2025   - UTD with Colon Ca screening (2023) - no additional screening advised   - (+) Osteoporosis - follows with Rheum - on Prolia - due for repeat in Nov/Dec 2025   - reviewed recent labs and script given for additional   - RTO in 1yr for AWV or as needed - pt aware and agreeable       Abdominal cramping  - (+) intermittent abdominal pain and bloating after eating - would like to see GI   Orders:    Ambulatory Referral to Gastroenterology; Future    Screening cholesterol level    Orders:    Lipid panel; Future    Nephrostomy status (HCC)  - Left nephrostomy tube has been internalized  - Right nephrostomy tube remains in position           Preventive health issues were discussed with patient, and age appropriate screening tests were ordered as noted in patient's After Visit Summary. Personalized health advice and appropriate referrals for health education or preventive services given if needed, as noted in patient's After Visit Summary.    History of Present Illness     HPI as below     Patient Care Team:  Emely Yost DO as PCP - General (Family Medicine)  BRITANY Ng MD (Obstetrics and Gynecology)  Eliezer Vega MD (Radiation Oncology)  Meaghan Crandall RD (Nutrition)  Generic Provider MD Latnoia (Inactive) as Referring Physician (General Practice)  Dani Velazquez MD (Gynecologic Oncology)  Tawana Jara RD (Nutrition)  Wade Villalobos MD (Nephrology)  Beth Rodriguez MD (Gynecologic Oncology)  Bina Kent PA-C (Hematology and Oncology)    Review of Systems as per HPI      Medical History Reviewed by provider this encounter:  Tobacco  Allergies  Meds  Problems  Med Hx  Surg Hx  Fam Hx       Annual Wellness Visit Questionnaire   Ragini is here for her Subsequent Wellness visit. Last Medicare Wellness visit information reviewed, patient interviewed and updates made to the record today.      Health Risk Assessment:   Patient rates overall health as fair. Patient feels that their physical health rating is slightly worse. Patient is satisfied with their life. Eyesight was rated as same. Hearing was rated as same. Patient feels that their emotional and mental health rating is slightly worse. Patients states they are sometimes angry. Patient states they are always unusually tired/fatigued. Pain experienced in the last 7 days has been none. Patient states that she has experienced weight loss or gain in last 6 months.     Depression Screening:   PHQ-9 Score: 0      Fall Risk Screening:   In the past year, patient has experienced: no history of falling in past year      Urinary Incontinence Screening:   Patient has leaked urine accidently in the last six months.     Home Safety:  Patient has trouble with stairs inside or outside of their home. Patient has working smoke alarms and has no working carbon monoxide detector.     Nutrition:   Current diet is Regular.     Medications:   Patient is currently taking over-the-counter supplements. OTC medications include: see medication list. Patient is able to manage medications.     Activities of Daily Living (ADLs)/Instrumental Activities of Daily Living (IADLs):   Walk and transfer into and out of bed and chair?: Yes  Dress and groom yourself?: Yes    Bathe or shower yourself?: Yes    Feed yourself? Yes  Do your laundry/housekeeping?: Yes  Manage your money, pay your bills and track your expenses?: Yes  Make your own meals?: Yes    Do your own shopping?: No    Previous Hospitalizations:   Any hospitalizations or ED visits within the last  12 months?: Yes    How many hospitalizations have you had in the last year?: more than 4    Advance Care Planning:   Living will: Yes    Durable POA for healthcare: Yes    Advanced directive: Yes      Comments: Sister magdy is durable poa/ and brother pelon is durable poa    Cognitive Screening:   Provider or family/friend/caregiver concerned regarding cognition?: No    PREVENTIVE SCREENINGS      Cardiovascular Screening:    General: Screening Current and Risks and Benefits Discussed    Due for: Lipid Panel      Diabetes Screening:     General: Screening Current      Colorectal Cancer Screening:     General: Screening Current      Breast Cancer Screening:     General: Screening Current    Due for: Mammogram        Cervical Cancer Screening:    General: Screening Not Indicated      Osteoporosis Screening:    General: Screening Not Indicated and History Osteoporosis      Abdominal Aortic Aneurysm (AAA) Screening:        General: Screening Not Indicated      Lung Cancer Screening:     General: Screening Not Indicated      Hepatitis C Screening:    General: Screening Current      Preventive Screening Comments: 70yo F presents to the office for sub AWV  - UTD with PCV20   - UTD with Tdap  - UTD with annual Flu vaccine   - has Mammo scheduled for 5/19/2025   - UTD with Colon Ca screening (12/2023) - no additional screening advised   - (+) intermittent abdominal pain and bloating after eating - would like to see GI   - denies F/C/N/V/CP/palpitations/SOB/wheezing/abd pain/LE edema     Screening, Brief Intervention, and Referral to Treatment (SBIRT)     Screening  Typical number of drinks in a day: 0  Typical number of drinks in a week: 0  Interpretation: Low risk drinking behavior.    Single Item Drug Screening:  How often have you used an illegal drug (including marijuana) or a prescription medication for non-medical reasons in the past year? never    Single Item Drug Screen Score: 0  Interpretation: Negative screen for  "possible drug use disorder    Social Drivers of Health     Financial Resource Strain: Low Risk  (9/26/2023)    Overall Financial Resource Strain (CARDIA)     Difficulty of Paying Living Expenses: Not very hard   Food Insecurity: No Food Insecurity (3/27/2025)    Hunger Vital Sign     Worried About Running Out of Food in the Last Year: Never true     Ran Out of Food in the Last Year: Never true   Transportation Needs: No Transportation Needs (3/27/2025)    PRAPARE - Transportation     Lack of Transportation (Medical): No     Lack of Transportation (Non-Medical): No   Housing Stability: Low Risk  (3/27/2025)    Housing Stability Vital Sign     Unable to Pay for Housing in the Last Year: No     Number of Times Moved in the Last Year: 0     Homeless in the Last Year: No   Utilities: Not At Risk (3/27/2025)    [facility] Utilities     Threatened with loss of utilities: No     No results found.    Objective   /80   Ht 4' 9\" (1.448 m)   Wt 70.3 kg (155 lb)   LMP  (LMP Unknown)   BMI 33.54 kg/m²     Physical Exam  Vitals reviewed.   Constitutional:       General: She is not in acute distress.     Appearance: Normal appearance. She is not ill-appearing, toxic-appearing or diaphoretic.   HENT:      Head: Normocephalic and atraumatic.      Right Ear: External ear normal.      Left Ear: External ear normal.      Nose: Nose normal.   Eyes:      General: No scleral icterus.        Right eye: No discharge.         Left eye: No discharge.      Extraocular Movements: Extraocular movements intact.      Conjunctiva/sclera: Conjunctivae normal.   Cardiovascular:      Rate and Rhythm: Normal rate and regular rhythm.      Heart sounds: Normal heart sounds. No murmur heard.     No friction rub. No gallop.   Pulmonary:      Effort: Pulmonary effort is normal. No respiratory distress.      Breath sounds: Normal breath sounds. No stridor. No wheezing, rhonchi or rales.   Abdominal:      General: Bowel sounds are normal.      Palpations: " Abdomen is soft.      Tenderness: There is no abdominal tenderness.   Musculoskeletal:         General: Normal range of motion.      Cervical back: Normal range of motion.      Right lower leg: No edema.      Left lower leg: No edema.   Skin:     General: Skin is warm.   Neurological:      General: No focal deficit present.      Mental Status: She is alert and oriented to person, place, and time.   Psychiatric:         Mood and Affect: Mood normal.         Behavior: Behavior normal.

## 2025-03-28 ENCOUNTER — RESULTS FOLLOW-UP (OUTPATIENT)
Dept: OTHER | Facility: HOSPITAL | Age: 70
End: 2025-03-28

## 2025-03-28 NOTE — TELEPHONE ENCOUNTER
LM for patient about the following, asked her to call back if she has any questions:    ----- Message from Wade Villalobos MD sent at 3/28/2025  1:37 PM EDT -----  Please let the patient know that kidney function is looking stable at baseline.  Phosphorus level is improved.  Iron stores are also looking good and there is no evidence of iron deficiency.  Continue current medications.

## 2025-04-07 ENCOUNTER — OFFICE VISIT (OUTPATIENT)
Dept: NEPHROLOGY | Facility: CLINIC | Age: 70
End: 2025-04-07
Payer: COMMERCIAL

## 2025-04-07 VITALS
DIASTOLIC BLOOD PRESSURE: 78 MMHG | HEIGHT: 57 IN | WEIGHT: 156 LBS | SYSTOLIC BLOOD PRESSURE: 132 MMHG | BODY MASS INDEX: 33.66 KG/M2

## 2025-04-07 DIAGNOSIS — Z01.818 PRE-TRANSPLANT EVALUATION FOR CKD (CHRONIC KIDNEY DISEASE): Primary | ICD-10-CM

## 2025-04-07 PROCEDURE — 99215 OFFICE O/P EST HI 40 MIN: CPT | Performed by: INTERNAL MEDICINE

## 2025-04-07 NOTE — PROGRESS NOTES
Name: Ragini Melendez      : 1955      MRN: [id number]  Encounter Provider: Mily Bond MD  Encounter Date: 2025   Encounter department: [facility] [place]  :  Assessment & Plan  Pre-transplant evaluation for CKD (chronic kidney disease)  Plan   1.  Patient is a preemptive renal transplant eval patient candidate.  She has now been in remission from her malignancy since at least  and was initially diagnosed in  with last dose of chemotherapy in .  She has not had reconstruction of her urinary tract as outlined below.  From a medical transplant evaluation standpoint, we can likely proceed further with testing this year with the patient knowing and understanding at today's visit that she will not be an active candidate until she is in remission for at least 5 years and she and her sister understand this.    Patient has a history of endometrial carcinoma and underwent ASH/BSO followed by radiation first diagnosed in .  Last dose of chemotherapy was 2022 but she has not had recurrence since.  Appears to have functional bladder but developed likely distal ureteral strictures from radiation and initially was managed with ureteral stents and ultimately failed stents and then was managed with nephrostomy tubes.  Ultimately underwent exploratory laparotomy with creation of ileal conduit.  Had antegrade nephrostogram's and there did not appear to be antegrade contrast passage in either ureter.  And therefore both nephrostomy tubes were reopened to gravity.  Patient underwent left retrograde PCNU 2024.  Right collecting system abnormal anatomy due to stenotic central portion and unable to advance wire and therefore remains with the right PCN.  - Left kidney is now managed with indwelling single-J ureteral stent through ileal conduit with exchanges every 3 months  - Right nephrostomy tube is open to gravity-to note right kidney is relatively atrophic  but has not completely failed as of yet and therefore nephrostomy tube is not capped.  - Patient is preemptive renal transplant evaluation candidate.  Most recent creatinine was 2.1 mg/dL on March 27, 2025 with GFR of 24.  - from R PCN, 400 cc  - BMI 33 moderate-sized pannus    Total time spent on patient care 46 minutes including chart review, imaging review, prior note review, hospitalization records, patient interview, patient exam, discussion with the patient and sister      Other notable history-  -history of endometrial cancer diagnosed in 2017 status post surgery as outlined above and completed chemotherapy.  Patient first had surgery in 2017, and then in 2019 had lymph node biopsy with high-grade adenocarcinoma and received carboplatin x 6 doses from January 2019 to January 2020 and then received adjuvant radiation therapy which completed in 2020 complicated by new retroperitoneal lymphadenopathy (diagnosed after new DVT) and then started on chemotherapy with rucaparib, atezolizumab, and bevacizumab from December 2020 until December 2022.  Currently no evidence of disease and is on surveillance follow-up wit gynecology.  Will need hematology and gynecology clearance.  Follows with hematology at [facility].  -history of DVT-diagnosed in 2019 after having RLE swelling.   -history of gastric bypass in 2001. BMI currently 30. Prior was 270 lbs prior to 2001. Then gained weight but during chemotherapy was 103 lbs at lowest. Is now 139 lbs.   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 female who presents for f/u visit for renal transplant evaluation. No recent fevers, chills, nausea, vomiting.   History obtained from: patient    Review of Systems   Constitutional: Negative.  Negative for fatigue.   HENT: Negative.     Eyes: Negative.    Respiratory: Negative.  Negative for  shortness of breath.    Cardiovascular: Negative.  Negative for leg swelling.   Gastrointestinal: Negative.    Endocrine: Negative.    Genitourinary: Negative.  Negative for difficulty urinating.   Musculoskeletal: Negative.    Skin: Negative.    Allergic/Immunologic: Negative.    Neurological: Negative.    Hematological: Negative.    Psychiatric/Behavioral: Negative.     All other systems reviewed and are negative.    Pertinent Medical History      69 y.o. female  referred by Dr Villalobos, with a past medical history of CKD stage IV, with native kidney disease felt to be secondary to hypertension/obstructive uropathy with bilateral ureteral strictures/possible glomerular pathology in setting of VEG F inhibitor/age-related nephron loss, recurrent endometrial cancer first diagnosed in 2017 status post total abdominal hysterectomy and bilateral salpingo-oophorectomy status post chemotherapy with VEGF inhibitor which completed December 2022, no evidence of disease and surveillance every 6 months currently, bilateral ureteral strictures possibly from radiation therapy with bilateral nephrostomy tubes, history of DVT, history of gastric bypass, nephrolithiasis, UTIs frequently during chemotherapy, seen in the Nephrology Clinic for pre-transplant kidney evaluation.     Prior DEXA scan in November 2023 with osteoporosis.     CT scan without IV contrast in May 2023 with moderate right and mild to moderate left hydronephrosis with ureteral stents.  Persistent hydronephrosis raises concern for malfunction of the catheters.     Prior echocardiogram December 2022 with EF 60%.    I last saw the patient April 2024 for transplant evaluation.  Patient is now presenting April 2025 for update visit for medical transplant evaluation updates.  Since I last saw the patient    May 2025-patient saw urologist for bilateral distal ureteral strictures likely secondary to radiation with indwelling nephrostomy tubes-patient was advised to  continue nephrostomy tubes open to gravity with changes with interventional radiology team while surgical plans are being set up for possible exploratory laparotomy with creation of ileal conduit?    October 24, 2024-patient underwent ileal conduit with urinary diversion, no cystectomy, bilateral ureterolysis     Plan   1.  Patient is a preemptive renal transplant eval patient candidate.  She has now been in remission from her malignancy since at least 2022 and was initially diagnosed in 2017 with last dose of chemotherapy in 2022.  She has not had reconstruction of her urinary tract as outlined below.  From a medical transplant evaluation standpoint, we can likely proceed further with testing this year with the patient knowing and understanding at today's visit that she will not be an active candidate until she is in remission for at least 5 years and she and her sister understand this.    Patient has a history of endometrial carcinoma and underwent ASH/BSO followed by radiation first diagnosed in 2017.  Last dose of chemotherapy was December 2022 but she has not had recurrence since.  Appears to have functional bladder but developed likely distal ureteral strictures from radiation and initially was managed with ureteral stents and ultimately failed stents and then was managed with nephrostomy tubes.  Ultimately underwent exploratory laparotomy with creation of ileal conduit.  Had antegrade nephrostogram's and there did not appear to be antegrade contrast passage in either ureter.  And therefore both nephrostomy tubes were reopened to gravity.  Patient underwent left retrograde PCNU December 4, 2024.  Right collecting system abnormal anatomy due to stenotic central portion and unable to advance wire and therefore remains with the right PCN.  - Left kidney is now managed with indwelling single-J ureteral stent through ileal conduit with exchanges every 3 months  - Right nephrostomy tube is open to gravity-to note  right kidney is relatively atrophic but has not completely failed as of yet and therefore nephrostomy tube is not capped.  - Patient is preemptive renal transplant evaluation candidate.  Most recent creatinine was 2.1 mg/dL on March 27, 2025 with GFR of 24.  - from R PCN, 400 cc      Other notable history-  -history of endometrial cancer diagnosed in 2017 status post surgery as outlined above and completed chemotherapy.  Patient first had surgery in 2017, and then in 2019 had lymph node biopsy with high-grade adenocarcinoma and received carboplatin x 6 doses from January 2019 to January 2020 and then received adjuvant radiation therapy which completed in 2020 complicated by new retroperitoneal lymphadenopathy (diagnosed after new DVT) and then started on chemotherapy with rucaparib, atezolizumab, and bevacizumab from December 2020 until December 2022.  Currently no evidence of disease and is on surveillance follow-up wit gynecology.  Will need hematology and gynecology clearance.  Follows with hematology at [facility].  -history of DVT-diagnosed in 2019 after having RLE swelling.   -history of gastric bypass in 2001. BMI currently 30. Prior was 270 lbs prior to 2001. Then gained weight but during chemotherapy was 103 lbs at lowest. Is now 139 lbs.   -history of MGUS?-Biclonal gammopathy diagnosed in 2023   - has GI visit scheduled 4/15 due to abdominal cramps           Medical History Reviewed by provider this encounter:     .  Past Medical History   Past Medical History:   Diagnosis Date    Anemia     Anxiety     Cancer (HCC)     ENDOMETRIAL    Chemotherapy induced neutropenia (HCC) 08/30/2019    Chronic kidney disease     CKD (chronic kidney disease) stage 3, GFR 30-59 ml/min (HCC)     COVID     Fall 2022    Depression     DVT (deep venous thrombosis) (HCC) 07/19/2019    RIGHT LEG    Endometrial cancer (HCC) 12/2017    GERD (gastroesophageal reflux disease)     H/O gastric bypass     Hard to intubate     pt denies  AS OF 7/25/23    History of chemotherapy     started 12/2021endometrial cancer- done 12/23/22    History of DVT in adulthood     RLE    History of transfusion 04/13/2023    Hyperglycemia     vx type 2 dm -- last assessed 4/1/14; resolved 11/7/17    Hyperlipidemia     Hypertension     in past- no meds at present    Iron deficiency anemia     iron infusions weekly    Iron deficiency anemia secondary to inadequate dietary iron intake 02/08/2023    OAB (overactive bladder)     Port-A-Cath in place     Wears glasses      Past Surgical History:   Procedure Laterality Date    ABDOMINAL SURGERY      GASTRIC BYPASS; 2001    BREAST BIOPSY Right 06/28/2019    core biopsy; benign    CHOLECYSTECTOMY      at the time of gastric bypass    COLONOSCOPY      CT NEEDLE BIOPSY LYMPH NODE  07/08/2019    CYSTECTOMY, RADICAL WITH ILEOCONDUIT N/A 10/24/2024    Procedure: ILEAL CONDUIT urinary diversion (No cystectomy), BILATERAL UTERO-LYSIS;  Surgeon: Demetrius Lowe MD;  [facility];  Service: Urology    FL GUIDED CENTRAL VENOUS ACCESS DEVICE INSERTION  11/12/2019    FL RETROGRADE PYELOGRAM  03/30/2023    FL RETROGRADE PYELOGRAM  05/09/2023    FL RETROGRADE PYELOGRAM  8/1/2023    GASTRIC BYPASS      HYSTERECTOMY Bilateral 2017    total abdominal with salpingo-oophorectomy    IR NEPHROSTOMY TO NEPHROURETERAL STENT  06/09/2022    IR NEPHROSTOMY TO NEPHROURETERAL STENT  12/20/2022    IR NEPHROSTOMY TO NEPHROURETERAL STENT  8/8/2023    IR NEPHROSTOMY TO NEPHROURETERAL STENT  12/4/2024    IR NEPHROSTOMY TO NEPHROURETERAL STENT  3/6/2025    IR NEPHROSTOMY TUBE CHECK/CHANGE/REPOSITION/REINSERTION/UPSIZE  05/27/2022    IR NEPHROSTOMY TUBE CHECK/CHANGE/REPOSITION/REINSERTION/UPSIZE  11/10/2023    IR NEPHROSTOMY TUBE CHECK/CHANGE/REPOSITION/REINSERTION/UPSIZE  2/9/2024    IR NEPHROSTOMY TUBE CHECK/CHANGE/REPOSITION/REINSERTION/UPSIZE  4/12/2024    IR NEPHROSTOMY TUBE CHECK/CHANGE/REPOSITION/REINSERTION/UPSIZE  7/12/2024    IR  NEPHROSTOMY TUBE CHECK/CHANGE/REPOSITION/REINSERTION/UPSIZE  10/16/2024    IR NEPHROSTOMY TUBE CHECK/CHANGE/REPOSITION/REINSERTION/UPSIZE  11/6/2024    IR NEPHROSTOMY TUBE PLACEMENT  07/26/2019    IR NEPHROSTOMY TUBE PLACEMENT  05/27/2023    IR NEPHROURETERAL STENT CHECK/CHANGE/REPOSITION  04/06/2021    IR NEPHROURETERAL STENT CHECK/CHANGE/REPOSITION  06/04/2021    IR NEPHROURETERAL STENT CHECK/CHANGE/REPOSITION  09/03/2021    IR NEPHROURETERAL STENT CHECK/CHANGE/REPOSITION  12/07/2021    IR NEPHROURETERAL STENT CHECK/CHANGE/REPOSITION  03/08/2022    IR NEPHROURETERAL STENT CHECK/CHANGE/REPOSITION  05/10/2022    IR NEPHROURETERAL STENT CHECK/CHANGE/REPOSITION  09/19/2022    IR PICC LINE  09/27/2019    IR PORT PLACEMENT  07/26/2019    IR PORT PLACEMENT      IR PORT REMOVAL  09/20/2019    OOPHORECTOMY Bilateral 2017    AK CYSTO W/INSERT URETERAL STENT Right 03/30/2023    Procedure: EXCHANGE STENT URETERAL;  Surgeon: Demetrius Lowe MD;  [facility];  Service: Urology    AK CYSTOURETHROSCOPY W/URETERAL CATHETERIZATION Right 03/30/2023    Procedure: CYSTOSCOPY RETROGRADE PYELOGRAM WITH exchange of STENT URETERAL;  Surgeon: Demetrius Lowe MD;  [facility];  Service: Urology    AK CYSTOURETHROSCOPY W/URETERAL CATHETERIZATION Bilateral 05/09/2023    Procedure: CYSTOSCOPY, BILATERAL RETROGRADE PYELOGRAM, WITH LEFT INSERTION STENT URETERAL, RIGHT URTERAL STENT EXCHANGE;  Surgeon: Nicola Hannah MD;  [facility];  Service: Urology    AK CYSTOURETHROSCOPY W/URETERAL CATHETERIZATION Bilateral 8/1/2023    Procedure: CYSTOSCOPY BILATERAL RETROGRADE  WITH REMOVAL BILATERAL  STENT URETERAL;  Surgeon: Demetrius Lowe MD;  [facility];  Service: Urology    AK INSJ TUNNELED CTR VAD W/SUBQ PORT AGE 7 YR/> Left 11/12/2019    Procedure: INSERTION VENOUS PORT ( PORT-A-CATH) IR;  Surgeon: Edilberto Guzman DO;  [facility];  Service: Interventional Radiology    AK LAPS  ABD PRTM&OMENTUM DX W/WO SPEC BR/WA SPX N/A 12/19/2017    Procedure: LAPAROSCOPY DIAGNOSTIC;  Surgeon: Evgeny So MD;  [facility];  Service: Gynecology Oncology    WV LAPS W/RAD HYST W/BILAT LMPHADEC RMVL TUBE/OVARY N/A 12/19/2017    Procedure: HYSTERECTOMY LAPAROSCOPIC TOTAL (LTH) W/ ROBOTICS; BILATERAL SALPINGOOPHERECTOMY; LYMPH NODE DISSECTION; lysis of adhesions;  Surgeon: Evgeny So MD;  [facility];  Service: Gynecology Oncology    REMOVAL URETERAL STENT Bilateral 8/1/2023    Procedure: REMOVAL STENT URETERAL;  Surgeon: Demetrius Lowe MD;  [facility];  Service: Urology    TONSILLECTOMY      US GUIDED BREAST BIOPSY RIGHT COMPLETE Right 06/28/2019     Family History   Problem Relation Age of Onset    Hyperlipidemia Mother     Heart disease Mother     Ovarian cancer Mother 50    Colon cancer Mother     Lymphoma Father     Breast cancer Sister 63    No Known Problems Brother     No Known Problems Brother     No Known Problems Maternal Grandmother     Bone cancer Maternal Grandfather     Uterine cancer Paternal Grandmother     No Known Problems Paternal Grandfather     No Known Problems Maternal Aunt     No Known Problems Maternal Aunt     No Known Problems Maternal Aunt     No Known Problems Maternal Aunt     No Known Problems Paternal Aunt     No Known Problems Paternal Aunt     No Known Problems Paternal Aunt     No Known Problems Paternal Aunt       reports that she has never smoked. She has never used smokeless tobacco. She reports that she does not drink alcohol and does not use drugs.  Current Outpatient Medications   Medication Instructions    acetaminophen (TYLENOL) 650 mg, Oral, Every 6 hours PRN    ARIPiprazole (ABILIFY) 20 mg, Every morning    BD PosiFlush 0.9 % SOLN No dose, route, or frequency recorded.    calcitriol (ROCALTROL) 0.25 mcg, Oral, Daily    calcium acetate (PHOSLO) 1,334 mg, Oral, 2 times daily, 2 capsules with lunch, 2 capsules with  dinner    Calcium Carbonate (CALCIUM 500 PO) 500 mg, Daily    Calcium-Magnesium-Vitamin D (CALCIUM 500 PO) 1 capsule, Daily at bedtime    cetirizine (ZYRTEC) 10 mg, Oral, Daily    cholecalciferol (VITAMIN D3) 4,000 Units, Oral, Daily    Cyanocobalamin (VITAMIN B12 PO) 1 capsule, Daily after lunch    docusate sodium (COLACE) 100 mg, Oral, 2 times daily    enoxaparin (LOVENOX) 1 mg/kg, Subcutaneous, Every 24 hours    fluticasone (FLONASE) 50 mcg/act nasal spray 1 spray, Nasal, Daily    folic acid (FOLVITE) 1 mg, Oral, Daily    furosemide (LASIX) 20 mg, Oral, As needed    methocarbamol (ROBAXIN) 750 mg, Oral, Every 6 hours scheduled    Multiple Vitamin (MULTIVITAMIN) tablet 1 tablet, Every morning    naloxone (NARCAN) 4 mg/0.1 mL nasal spray Administer 1 spray into a nostril. If no response after 2-3 minutes, give another dose in the other nostril using a new spray.    omeprazole (PRILOSEC) 20 mg, As needed    oxyCODONE-acetaminophen (Percocet) 5-325 mg per tablet 1 tablet, Oral, Every 4 hours PRN    polyethylene glycol (MIRALAX) 17 g, Oral, Daily    sodium bicarbonate 650 mg, Oral, 3 times daily    SODIUM CHLORIDE, EXTERNAL, 0.9 % SOLN 10 mL, Daily    sodium chloride, PF, 0.9 % 10 mL, Intracatheter, Daily, Intracatheter flushing daily. May substitute prefilled syringe with normal saline 10 mL vials, 10 mL syringes, and 18 g blunt needles    sodium chloride, PF, 0.9 % 10 mL, Intracatheter, Daily, Intracatheter flushing daily. May substitute prefilled syringe with normal saline 10 mL vials, 10 mL syringes, and 18 g blunt needles    venlafaxine (EFFEXOR-XR) 75 mg 24 hr capsule TAKE 3 CAPS DAILY     Allergies   Allergen Reactions    Cephalosporins Rash     Which Cephalosporin reaction was to not specified; however, has tolerated Amoxicillin, Cefazolin, and Cefepime      Current Outpatient Medications on File Prior to Visit   Medication Sig Dispense Refill    acetaminophen (TYLENOL) 325 mg tablet Take 2 tablets (650 mg  total) by mouth every 6 (six) hours as needed for mild pain      ARIPiprazole (ABILIFY) 20 MG tablet Take 20 mg by mouth every morning      BD PosiFlush 0.9 % SOLN  (Patient not taking: Reported on 11/5/2024)      calcitriol (ROCALTROL) 0.25 mcg capsule Take 1 capsule (0.25 mcg total) by mouth daily 90 capsule 3    calcium acetate (PHOSLO) capsule Take 2 capsules (1,334 mg total) by mouth 2 (two) times a day 2 capsules with lunch, 2 capsules with dinner 180 capsule 3    Calcium Carbonate (CALCIUM 500 PO) Take 500 mg by mouth in the morning (Patient taking differently: Take 500 mg by mouth 2 (two) times a day)      Calcium-Magnesium-Vitamin D (CALCIUM 500 PO) Take 1 capsule by mouth daily at bedtime      cetirizine (ZyrTEC) 10 mg tablet Take 1 tablet (10 mg total) by mouth daily (Patient taking differently: Take 10 mg by mouth if needed) 90 tablet 1    cholecalciferol (VITAMIN D3) 1,000 units tablet Take 4 tablets (4,000 Units total) by mouth daily 90 tablet 0    Cyanocobalamin (VITAMIN B12 PO) Take 1 capsule by mouth daily after lunch      docusate sodium (COLACE) 100 mg capsule Take 1 capsule (100 mg total) by mouth 2 (two) times a day (Patient taking differently: Take 100 mg by mouth if needed) 180 capsule 0    enoxaparin (Lovenox) 80 mg/0.8 mL Inject 0.7 mL (70 mg total) under the skin every 24 hours (Patient not taking: Reported on 12/6/2024) 24 mL 0    fluticasone (FLONASE) 50 mcg/act nasal spray 1 spray into each nostril daily 15.8 mL 0    folic acid (FOLVITE) 1 mg tablet Take 1 tablet (1 mg total) by mouth daily  0    furosemide (LASIX) 20 mg tablet TAKE 1 TABLET (20 MG TOTAL) BY MOUTH IF NEEDED (FOR INCREASED LEG SWELLING OR WEIGHT GAIN >2 LBS IN 1 DAY) 90 tablet 1    methocarbamol (ROBAXIN) 750 mg tablet Take 1 tablet (750 mg total) by mouth every 6 (six) hours for 10 days (Patient not taking: Reported on 12/6/2024) 40 tablet 0    Multiple Vitamin (MULTIVITAMIN) tablet Take 1 tablet by mouth every morning       naloxone (NARCAN) 4 mg/0.1 mL nasal spray Administer 1 spray into a nostril. If no response after 2-3 minutes, give another dose in the other nostril using a new spray. (Patient not taking: Reported on 12/6/2024) 1 each 1    omeprazole (PriLOSEC) 20 mg delayed release capsule Take 20 mg by mouth as needed (GERD)      oxyCODONE-acetaminophen (Percocet) 5-325 mg per tablet Take 1 tablet by mouth every 4 (four) hours as needed for moderate pain Max Daily Amount: 6 tablets (Patient not taking: Reported on 12/6/2024) 10 tablet 0    polyethylene glycol (MIRALAX) 17 g packet Take 17 g by mouth daily for 7 days (Patient not taking: Reported on 3/13/2025) 119 g 0    sodium bicarbonate 650 mg tablet TAKE 1 TABLET BY MOUTH THREE TIMES DAILY 270 tablet 1    SODIUM CHLORIDE, EXTERNAL, 0.9 % SOLN 10 mL by Intracatheter route daily. Intracatheter flushing daily. May substitute prefilled syringe with normal saline 10 mL vials, 10 mL syringes, and 18 g blunt needles      sodium chloride, PF, 0.9 % 10 mL by Intracatheter route daily Intracatheter flushing daily. May substitute prefilled syringe with normal saline 10 mL vials, 10 mL syringes, and 18 g blunt needles (Patient not taking: Reported on 3/13/2025) 300 mL 0    sodium chloride, PF, 0.9 % 10 mL by Intracatheter route daily for 120 doses Intracatheter flushing daily. May substitute prefilled syringe with normal saline 10 mL vials, 10 mL syringes, and 18 g blunt needles 300 mL 3    venlafaxine (EFFEXOR-XR) 75 mg 24 hr capsule TAKE 3 CAPS DAILY       No current facility-administered medications on file prior to visit.      Social History     Tobacco Use    Smoking status: Never    Smokeless tobacco: Never   Vaping Use    Vaping status: Never Used   Substance and Sexual Activity    Alcohol use: Never    Drug use: Never    Sexual activity: Not Currently        Objective   LMP  (LMP Unknown)      Physical Exam  Vitals and nursing note reviewed.   Constitutional:       General:  She is not in acute distress.     Appearance: She is well-developed. She is not diaphoretic.   HENT:      Head: Normocephalic and atraumatic.   Eyes:      General: No scleral icterus.        Right eye: No discharge.         Left eye: No discharge.      Conjunctiva/sclera: Conjunctivae normal.   Neck:      Vascular: No JVD.   Cardiovascular:      Rate and Rhythm: Normal rate and regular rhythm.      Heart sounds: No murmur heard.     No friction rub. No gallop.   Pulmonary:      Effort: Pulmonary effort is normal. No respiratory distress.      Breath sounds: Normal breath sounds. No wheezing or rales.   Abdominal:      General: Bowel sounds are normal. There is no distension.      Palpations: Abdomen is soft.      Tenderness: There is no abdominal tenderness. There is no rebound.      Comments: Positive urostomy with clear yellow urine with 1 stent noted.  Right PCN entrance site to right flank without erythema or drainage and urine is yellow and clear.   Musculoskeletal:         General: No tenderness or deformity. Normal range of motion.      Cervical back: Normal range of motion and neck supple.      Right lower leg: No edema.      Left lower leg: No edema.   Skin:     General: Skin is warm and dry.      Coloration: Skin is not pale.      Findings: No erythema or rash.   Neurological:      Mental Status: She is alert and oriented to person, place, and time.      Coordination: Coordination normal.   Psychiatric:         Behavior: Behavior normal.         Thought Content: Thought content normal.         Judgment: Judgment normal.

## 2025-04-07 NOTE — LETTER
2025     Emely Yost DO   [facility] [address]    Patient: Ragini Melendez   YOB: 1955   Date of Visit: 2025       Dear DO Wade Beltran MD:    Thank you for referring Ragini Melendez to me for evaluation. Below are my notes for this consultation.    If you have questions, please do not hesitate to call me. I look forward to following your patient along with you.         Sincerely,        Mily Bond MD        CC: MD Mily Walker MD  2025 12:15 PM  Sign when Signing Visit  Name: Ragini Melendez      : 1955      MRN: [id number]  Encounter Provider: Mily Bond MD  Encounter Date: 2025   Encounter department: [facility] NEPHROLOGY [facility] [place]  :  Assessment & Plan  Pre-transplant evaluation for CKD (chronic kidney disease)  Plan   1.  Patient is a preemptive renal transplant eval patient candidate.  She has now been in remission from her malignancy since at least  and was initially diagnosed in  with last dose of chemotherapy in .  She has not had reconstruction of her urinary tract as outlined below.  From a medical transplant evaluation standpoint, we can likely proceed further with testing this year with the patient knowing and understanding at today's visit that she will not be an active candidate until she is in remission for at least 5 years and she and her sister understand this.    Patient has a history of endometrial carcinoma and underwent ASH/BSO followed by radiation first diagnosed in .  Last dose of chemotherapy was 2022 but she has not had recurrence since.  Appears to have functional bladder but developed likely distal ureteral strictures from radiation and initially was managed with ureteral stents and ultimately failed stents and then was managed with nephrostomy tubes.  Ultimately underwent exploratory laparotomy with creation of  ileal conduit.  Had antegrade nephrostogram's and there did not appear to be antegrade contrast passage in either ureter.  And therefore both nephrostomy tubes were reopened to gravity.  Patient underwent left retrograde PCNU December 4, 2024.  Right collecting system abnormal anatomy due to stenotic central portion and unable to advance wire and therefore remains with the right PCN.  - Left kidney is now managed with indwelling single-J ureteral stent through ileal conduit with exchanges every 3 months  - Right nephrostomy tube is open to gravity-to note right kidney is relatively atrophic but has not completely failed as of yet and therefore nephrostomy tube is not capped.  - Patient is preemptive renal transplant evaluation candidate.  Most recent creatinine was 2.1 mg/dL on March 27, 2025 with GFR of 24.  - from R PCN, 400 cc    Total time spent on patient care 46 minutes including chart review, imaging review, prior note review, hospitalization records, patient interview, patient exam, discussion with the patient and sister      Other notable history-  -history of endometrial cancer diagnosed in 2017 status post surgery as outlined above and completed chemotherapy.  Patient first had surgery in 2017, and then in 2019 had lymph node biopsy with high-grade adenocarcinoma and received carboplatin x 6 doses from January 2019 to January 2020 and then received adjuvant radiation therapy which completed in 2020 complicated by new retroperitoneal lymphadenopathy (diagnosed after new DVT) and then started on chemotherapy with rucaparib, atezolizumab, and bevacizumab from December 2020 until December 2022.  Currently no evidence of disease and is on surveillance follow-up wit gynecology.  Will need hematology and gynecology clearance.  Follows with hematology at [facility].  -history of DVT-diagnosed in 2019 after having RLE swelling.   -history of gastric bypass in 2001. BMI currently 30. Prior was 270 lbs prior to  2001. Then gained weight but during chemotherapy was 103 lbs at lowest. Is now 139 lbs.   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 female who presents for f/u visit for renal transplant evaluation. No recent fevers, chills, nausea, vomiting.   History obtained from: patient    Review of Systems   Constitutional: Negative.  Negative for fatigue.   HENT: Negative.     Eyes: Negative.    Respiratory: Negative.  Negative for shortness of breath.    Cardiovascular: Negative.  Negative for leg swelling.   Gastrointestinal: Negative.    Endocrine: Negative.    Genitourinary: Negative.  Negative for difficulty urinating.   Musculoskeletal: Negative.    Skin: Negative.    Allergic/Immunologic: Negative.    Neurological: Negative.    Hematological: Negative.    Psychiatric/Behavioral: Negative.     All other systems reviewed and are negative.    Pertinent Medical History      69 y.o. female  referred by Dr Villalobos, with a past medical history of CKD stage IV, with native kidney disease felt to be secondary to hypertension/obstructive uropathy with bilateral ureteral strictures/possible glomerular pathology in setting of VEG F inhibitor/age-related nephron loss, recurrent endometrial cancer first diagnosed in 2017 status post total abdominal hysterectomy and bilateral salpingo-oophorectomy status post chemotherapy with VEGF inhibitor which completed December 2022, no evidence of disease and surveillance every 6 months currently, bilateral ureteral strictures possibly from radiation therapy with bilateral nephrostomy tubes, history of DVT, history of gastric bypass, nephrolithiasis, UTIs frequently during chemotherapy, seen in the Nephrology Clinic for pre-transplant kidney evaluation.     Prior DEXA scan in November 2023 with osteoporosis.     CT scan without IV contrast in May  2023 with moderate right and mild to moderate left hydronephrosis with ureteral stents.  Persistent hydronephrosis raises concern for malfunction of the catheters.     Prior echocardiogram December 2022 with EF 60%.    I last saw the patient April 2024 for transplant evaluation.  Patient is now presenting April 2025 for update visit for medical transplant evaluation updates.  Since I last saw the patient    May 2025-patient saw urologist for bilateral distal ureteral strictures likely secondary to radiation with indwelling nephrostomy tubes-patient was advised to continue nephrostomy tubes open to gravity with changes with interventional radiology team while surgical plans are being set up for possible exploratory laparotomy with creation of ileal conduit?    October 24, 2024-patient underwent ileal conduit with urinary diversion, no cystectomy, bilateral ureterolysis     Plan   1.  Patient is a preemptive renal transplant eval patient candidate.  She has now been in remission from her malignancy since at least 2022 and was initially diagnosed in 2017 with last dose of chemotherapy in 2022.  She has not had reconstruction of her urinary tract as outlined below.  From a medical transplant evaluation standpoint, we can likely proceed further with testing this year with the patient knowing and understanding at today's visit that she will not be an active candidate until she is in remission for at least 5 years and she and her sister understand this.    Patient has a history of endometrial carcinoma and underwent ASH/BSO followed by radiation first diagnosed in 2017.  Last dose of chemotherapy was December 2022 but she has not had recurrence since.  Appears to have functional bladder but developed likely distal ureteral strictures from radiation and initially was managed with ureteral stents and ultimately failed stents and then was managed with nephrostomy tubes.  Ultimately underwent exploratory laparotomy with  creation of ileal conduit.  Had antegrade nephrostogram's and there did not appear to be antegrade contrast passage in either ureter.  And therefore both nephrostomy tubes were reopened to gravity.  Patient underwent left retrograde PCNU December 4, 2024.  Right collecting system abnormal anatomy due to stenotic central portion and unable to advance wire and therefore remains with the right PCN.  - Left kidney is now managed with indwelling single-J ureteral stent through ileal conduit with exchanges every 3 months  - Right nephrostomy tube is open to gravity-to note right kidney is relatively atrophic but has not completely failed as of yet and therefore nephrostomy tube is not capped.  - Patient is preemptive renal transplant evaluation candidate.  Most recent creatinine was 2.1 mg/dL on March 27, 2025 with GFR of 24.  - from R PCN, 400 cc      Other notable history-  -history of endometrial cancer diagnosed in 2017 status post surgery as outlined above and completed chemotherapy.  Patient first had surgery in 2017, and then in 2019 had lymph node biopsy with high-grade adenocarcinoma and received carboplatin x 6 doses from January 2019 to January 2020 and then received adjuvant radiation therapy which completed in 2020 complicated by new retroperitoneal lymphadenopathy (diagnosed after new DVT) and then started on chemotherapy with rucaparib, atezolizumab, and bevacizumab from December 2020 until December 2022.  Currently no evidence of disease and is on surveillance follow-up wit gynecology.  Will need hematology and gynecology clearance.  Follows with hematology at [facility].  -history of DVT-diagnosed in 2019 after having RLE swelling.   -history of gastric bypass in 2001. BMI currently 30. Prior was 270 lbs prior to 2001. Then gained weight but during chemotherapy was 103 lbs at lowest. Is now 139 lbs.   -history of MGUS?-Biclonal gammopathy diagnosed in 2023   - has GI visit scheduled 4/15 due to abdominal  cramps           Medical History Reviewed by provider this encounter:     .  Past Medical History   Past Medical History:   Diagnosis Date   • Anemia    • Anxiety    • Cancer (HCC)     ENDOMETRIAL   • Chemotherapy induced neutropenia (HCC) 08/30/2019   • Chronic kidney disease    • CKD (chronic kidney disease) stage 3, GFR 30-59 ml/min (HCC)    • COVID     Fall 2022   • Depression    • DVT (deep venous thrombosis) (HCC) 07/19/2019    RIGHT LEG   • Endometrial cancer (HCC) 12/2017   • GERD (gastroesophageal reflux disease)    • H/O gastric bypass    • Hard to intubate     pt denies AS OF 7/25/23   • History of chemotherapy     started 12/2021endometrial cancer- done 12/23/22   • History of DVT in adulthood     RLE   • History of transfusion 04/13/2023   • Hyperglycemia     vx type 2 dm -- last assessed 4/1/14; resolved 11/7/17   • Hyperlipidemia    • Hypertension     in past- no meds at present   • Iron deficiency anemia     iron infusions weekly   • Iron deficiency anemia secondary to inadequate dietary iron intake 02/08/2023   • OAB (overactive bladder)    • Port-A-Cath in place    • Wears glasses      Past Surgical History:   Procedure Laterality Date   • ABDOMINAL SURGERY      GASTRIC BYPASS; 2001   • BREAST BIOPSY Right 06/28/2019    core biopsy; benign   • CHOLECYSTECTOMY      at the time of gastric bypass   • COLONOSCOPY     • CT NEEDLE BIOPSY LYMPH NODE  07/08/2019   • CYSTECTOMY, RADICAL WITH ILEOCONDUIT N/A 10/24/2024    Procedure: ILEAL CONDUIT urinary diversion (No cystectomy), BILATERAL UTERO-LYSIS;  Surgeon: Demetrius Lowe MD;  [facility];  Service: Urology   • FL GUIDED CENTRAL VENOUS ACCESS DEVICE INSERTION  11/12/2019   • FL RETROGRADE PYELOGRAM  03/30/2023   • FL RETROGRADE PYELOGRAM  05/09/2023   • FL RETROGRADE PYELOGRAM  8/1/2023   • GASTRIC BYPASS     • HYSTERECTOMY Bilateral 2017    total abdominal with salpingo-oophorectomy   • IR NEPHROSTOMY TO NEPHROURETERAL STENT   06/09/2022   • IR NEPHROSTOMY TO NEPHROURETERAL STENT  12/20/2022   • IR NEPHROSTOMY TO NEPHROURETERAL STENT  8/8/2023   • IR NEPHROSTOMY TO NEPHROURETERAL STENT  12/4/2024   • IR NEPHROSTOMY TO NEPHROURETERAL STENT  3/6/2025   • IR NEPHROSTOMY TUBE CHECK/CHANGE/REPOSITION/REINSERTION/UPSIZE  05/27/2022   • IR NEPHROSTOMY TUBE CHECK/CHANGE/REPOSITION/REINSERTION/UPSIZE  11/10/2023   • IR NEPHROSTOMY TUBE CHECK/CHANGE/REPOSITION/REINSERTION/UPSIZE  2/9/2024   • IR NEPHROSTOMY TUBE CHECK/CHANGE/REPOSITION/REINSERTION/UPSIZE  4/12/2024   • IR NEPHROSTOMY TUBE CHECK/CHANGE/REPOSITION/REINSERTION/UPSIZE  7/12/2024   • IR NEPHROSTOMY TUBE CHECK/CHANGE/REPOSITION/REINSERTION/UPSIZE  10/16/2024   • IR NEPHROSTOMY TUBE CHECK/CHANGE/REPOSITION/REINSERTION/UPSIZE  11/6/2024   • IR NEPHROSTOMY TUBE PLACEMENT  07/26/2019   • IR NEPHROSTOMY TUBE PLACEMENT  05/27/2023   • IR NEPHROURETERAL STENT CHECK/CHANGE/REPOSITION  04/06/2021   • IR NEPHROURETERAL STENT CHECK/CHANGE/REPOSITION  06/04/2021   • IR NEPHROURETERAL STENT CHECK/CHANGE/REPOSITION  09/03/2021   • IR NEPHROURETERAL STENT CHECK/CHANGE/REPOSITION  12/07/2021   • IR NEPHROURETERAL STENT CHECK/CHANGE/REPOSITION  03/08/2022   • IR NEPHROURETERAL STENT CHECK/CHANGE/REPOSITION  05/10/2022   • IR NEPHROURETERAL STENT CHECK/CHANGE/REPOSITION  09/19/2022   • IR PICC LINE  09/27/2019   • IR PORT PLACEMENT  07/26/2019   • IR PORT PLACEMENT     • IR PORT REMOVAL  09/20/2019   • OOPHORECTOMY Bilateral 2017   • VT CYSTO W/INSERT URETERAL STENT Right 03/30/2023    Procedure: EXCHANGE STENT URETERAL;  Surgeon: Demetrius Lowe MD;  [facility];  Service: Urology   • VT CYSTOURETHROSCOPY W/URETERAL CATHETERIZATION Right 03/30/2023    Procedure: CYSTOSCOPY RETROGRADE PYELOGRAM WITH exchange of STENT URETERAL;  Surgeon: Demetrius Lowe MD;  [facility];  Service: Urology   • VT CYSTOURETHROSCOPY W/URETERAL CATHETERIZATION Bilateral 05/09/2023    Procedure:  CYSTOSCOPY, BILATERAL RETROGRADE PYELOGRAM, WITH LEFT INSERTION STENT URETERAL, RIGHT URTERAL STENT EXCHANGE;  Surgeon: Nicola Hannah MD;  [facility];  Service: Urology   • HI CYSTOURETHROSCOPY W/URETERAL CATHETERIZATION Bilateral 8/1/2023    Procedure: CYSTOSCOPY BILATERAL RETROGRADE  WITH REMOVAL BILATERAL  STENT URETERAL;  Surgeon: Demetrius Lowe MD;  [facility];  Service: Urology   • HI INSJ TUNNELED CTR VAD W/SUBQ PORT AGE 7 YR/> Left 11/12/2019    Procedure: INSERTION VENOUS PORT ( PORT-A-CATH) IR;  Surgeon: Edilberto Guzman DO;  [facility];  Service: Interventional Radiology   • HI LAPS ABD PRTM&OMENTUM DX W/WO SPEC BR/WA SPX N/A 12/19/2017    Procedure: LAPAROSCOPY DIAGNOSTIC;  Surgeon: Evgeny So MD;  [facility];  Service: Gynecology Oncology   • HI LAPS W/RAD HYST W/BILAT LMPHADEC RMVL TUBE/OVARY N/A 12/19/2017    Procedure: HYSTERECTOMY LAPAROSCOPIC TOTAL (LTH) W/ ROBOTICS; BILATERAL SALPINGOOPHERECTOMY; LYMPH NODE DISSECTION; lysis of adhesions;  Surgeon: Evgeny So MD;  [facility];  Service: Gynecology Oncology   • REMOVAL URETERAL STENT Bilateral 8/1/2023    Procedure: REMOVAL STENT URETERAL;  Surgeon: Demetrius Lowe MD;  [facility];  Service: Urology   • TONSILLECTOMY     • US GUIDED BREAST BIOPSY RIGHT COMPLETE Right 06/28/2019     Family History   Problem Relation Age of Onset   • Hyperlipidemia Mother    • Heart disease Mother    • Ovarian cancer Mother 50   • Colon cancer Mother    • Lymphoma Father    • Breast cancer Sister 63   • No Known Problems Brother    • No Known Problems Brother    • No Known Problems Maternal Grandmother    • Bone cancer Maternal Grandfather    • Uterine cancer Paternal Grandmother    • No Known Problems Paternal Grandfather    • No Known Problems Maternal Aunt    • No Known Problems Maternal Aunt    • No Known Problems Maternal Aunt    • No Known Problems Maternal Aunt    •  No Known Problems Paternal Aunt    • No Known Problems Paternal Aunt    • No Known Problems Paternal Aunt    • No Known Problems Paternal Aunt       reports that she has never smoked. She has never used smokeless tobacco. She reports that she does not drink alcohol and does not use drugs.  Current Outpatient Medications   Medication Instructions   • acetaminophen (TYLENOL) 650 mg, Oral, Every 6 hours PRN   • ARIPiprazole (ABILIFY) 20 mg, Every morning   • BD PosiFlush 0.9 % SOLN No dose, route, or frequency recorded.   • calcitriol (ROCALTROL) 0.25 mcg, Oral, Daily   • calcium acetate (PHOSLO) 1,334 mg, Oral, 2 times daily, 2 capsules with lunch, 2 capsules with dinner   • Calcium Carbonate (CALCIUM 500 PO) 500 mg, Daily   • Calcium-Magnesium-Vitamin D (CALCIUM 500 PO) 1 capsule, Daily at bedtime   • cetirizine (ZYRTEC) 10 mg, Oral, Daily   • cholecalciferol (VITAMIN D3) 4,000 Units, Oral, Daily   • Cyanocobalamin (VITAMIN B12 PO) 1 capsule, Daily after lunch   • docusate sodium (COLACE) 100 mg, Oral, 2 times daily   • enoxaparin (LOVENOX) 1 mg/kg, Subcutaneous, Every 24 hours   • fluticasone (FLONASE) 50 mcg/act nasal spray 1 spray, Nasal, Daily   • folic acid (FOLVITE) 1 mg, Oral, Daily   • furosemide (LASIX) 20 mg, Oral, As needed   • methocarbamol (ROBAXIN) 750 mg, Oral, Every 6 hours scheduled   • Multiple Vitamin (MULTIVITAMIN) tablet 1 tablet, Every morning   • naloxone (NARCAN) 4 mg/0.1 mL nasal spray Administer 1 spray into a nostril. If no response after 2-3 minutes, give another dose in the other nostril using a new spray.   • omeprazole (PRILOSEC) 20 mg, As needed   • oxyCODONE-acetaminophen (Percocet) 5-325 mg per tablet 1 tablet, Oral, Every 4 hours PRN   • polyethylene glycol (MIRALAX) 17 g, Oral, Daily   • sodium bicarbonate 650 mg, Oral, 3 times daily   • SODIUM CHLORIDE, EXTERNAL, 0.9 % SOLN 10 mL, Daily   • sodium chloride, PF, 0.9 % 10 mL, Intracatheter, Daily, Intracatheter flushing daily. May  substitute prefilled syringe with normal saline 10 mL vials, 10 mL syringes, and 18 g blunt needles   • sodium chloride, PF, 0.9 % 10 mL, Intracatheter, Daily, Intracatheter flushing daily. May substitute prefilled syringe with normal saline 10 mL vials, 10 mL syringes, and 18 g blunt needles   • venlafaxine (EFFEXOR-XR) 75 mg 24 hr capsule TAKE 3 CAPS DAILY     Allergies   Allergen Reactions   • Cephalosporins Rash     Which Cephalosporin reaction was to not specified; however, has tolerated Amoxicillin, Cefazolin, and Cefepime      Current Outpatient Medications on File Prior to Visit   Medication Sig Dispense Refill   • acetaminophen (TYLENOL) 325 mg tablet Take 2 tablets (650 mg total) by mouth every 6 (six) hours as needed for mild pain     • ARIPiprazole (ABILIFY) 20 MG tablet Take 20 mg by mouth every morning     • BD PosiFlush 0.9 % SOLN  (Patient not taking: Reported on 11/5/2024)     • calcitriol (ROCALTROL) 0.25 mcg capsule Take 1 capsule (0.25 mcg total) by mouth daily 90 capsule 3   • calcium acetate (PHOSLO) capsule Take 2 capsules (1,334 mg total) by mouth 2 (two) times a day 2 capsules with lunch, 2 capsules with dinner 180 capsule 3   • Calcium Carbonate (CALCIUM 500 PO) Take 500 mg by mouth in the morning (Patient taking differently: Take 500 mg by mouth 2 (two) times a day)     • Calcium-Magnesium-Vitamin D (CALCIUM 500 PO) Take 1 capsule by mouth daily at bedtime     • cetirizine (ZyrTEC) 10 mg tablet Take 1 tablet (10 mg total) by mouth daily (Patient taking differently: Take 10 mg by mouth if needed) 90 tablet 1   • cholecalciferol (VITAMIN D3) 1,000 units tablet Take 4 tablets (4,000 Units total) by mouth daily 90 tablet 0   • Cyanocobalamin (VITAMIN B12 PO) Take 1 capsule by mouth daily after lunch     • docusate sodium (COLACE) 100 mg capsule Take 1 capsule (100 mg total) by mouth 2 (two) times a day (Patient taking differently: Take 100 mg by mouth if needed) 180 capsule 0   • enoxaparin  (Lovenox) 80 mg/0.8 mL Inject 0.7 mL (70 mg total) under the skin every 24 hours (Patient not taking: Reported on 12/6/2024) 24 mL 0   • fluticasone (FLONASE) 50 mcg/act nasal spray 1 spray into each nostril daily 15.8 mL 0   • folic acid (FOLVITE) 1 mg tablet Take 1 tablet (1 mg total) by mouth daily  0   • furosemide (LASIX) 20 mg tablet TAKE 1 TABLET (20 MG TOTAL) BY MOUTH IF NEEDED (FOR INCREASED LEG SWELLING OR WEIGHT GAIN >2 LBS IN 1 DAY) 90 tablet 1   • methocarbamol (ROBAXIN) 750 mg tablet Take 1 tablet (750 mg total) by mouth every 6 (six) hours for 10 days (Patient not taking: Reported on 12/6/2024) 40 tablet 0   • Multiple Vitamin (MULTIVITAMIN) tablet Take 1 tablet by mouth every morning     • naloxone (NARCAN) 4 mg/0.1 mL nasal spray Administer 1 spray into a nostril. If no response after 2-3 minutes, give another dose in the other nostril using a new spray. (Patient not taking: Reported on 12/6/2024) 1 each 1   • omeprazole (PriLOSEC) 20 mg delayed release capsule Take 20 mg by mouth as needed (GERD)     • oxyCODONE-acetaminophen (Percocet) 5-325 mg per tablet Take 1 tablet by mouth every 4 (four) hours as needed for moderate pain Max Daily Amount: 6 tablets (Patient not taking: Reported on 12/6/2024) 10 tablet 0   • polyethylene glycol (MIRALAX) 17 g packet Take 17 g by mouth daily for 7 days (Patient not taking: Reported on 3/13/2025) 119 g 0   • sodium bicarbonate 650 mg tablet TAKE 1 TABLET BY MOUTH THREE TIMES DAILY 270 tablet 1   • SODIUM CHLORIDE, EXTERNAL, 0.9 % SOLN 10 mL by Intracatheter route daily. Intracatheter flushing daily. May substitute prefilled syringe with normal saline 10 mL vials, 10 mL syringes, and 18 g blunt needles     • sodium chloride, PF, 0.9 % 10 mL by Intracatheter route daily Intracatheter flushing daily. May substitute prefilled syringe with normal saline 10 mL vials, 10 mL syringes, and 18 g blunt needles (Patient not taking: Reported on 3/13/2025) 300 mL 0   • sodium  chloride, PF, 0.9 % 10 mL by Intracatheter route daily for 120 doses Intracatheter flushing daily. May substitute prefilled syringe with normal saline 10 mL vials, 10 mL syringes, and 18 g blunt needles 300 mL 3   • venlafaxine (EFFEXOR-XR) 75 mg 24 hr capsule TAKE 3 CAPS DAILY       No current facility-administered medications on file prior to visit.      Social History     Tobacco Use   • Smoking status: Never   • Smokeless tobacco: Never   Vaping Use   • Vaping status: Never Used   Substance and Sexual Activity   • Alcohol use: Never   • Drug use: Never   • Sexual activity: Not Currently        Objective   LMP  (LMP Unknown)      Physical Exam  Vitals and nursing note reviewed.   Constitutional:       General: She is not in acute distress.     Appearance: She is well-developed. She is not diaphoretic.   HENT:      Head: Normocephalic and atraumatic.   Eyes:      General: No scleral icterus.        Right eye: No discharge.         Left eye: No discharge.      Conjunctiva/sclera: Conjunctivae normal.   Neck:      Vascular: No JVD.   Cardiovascular:      Rate and Rhythm: Normal rate and regular rhythm.      Heart sounds: No murmur heard.     No friction rub. No gallop.   Pulmonary:      Effort: Pulmonary effort is normal. No respiratory distress.      Breath sounds: Normal breath sounds. No wheezing or rales.   Abdominal:      General: Bowel sounds are normal. There is no distension.      Palpations: Abdomen is soft.      Tenderness: There is no abdominal tenderness. There is no rebound.      Comments: Positive urostomy with clear yellow urine with 1 stent noted.  Right PCN entrance site to right flank without erythema or drainage and urine is yellow and clear.   Musculoskeletal:         General: No tenderness or deformity. Normal range of motion.      Cervical back: Normal range of motion and neck supple.      Right lower leg: No edema.      Left lower leg: No edema.   Skin:     General: Skin is warm and dry.       Coloration: Skin is not pale.      Findings: No erythema or rash.   Neurological:      Mental Status: She is alert and oriented to person, place, and time.      Coordination: Coordination normal.   Psychiatric:         Behavior: Behavior normal.         Thought Content: Thought content normal.         Judgment: Judgment normal.

## 2025-04-07 NOTE — PATIENT INSTRUCTIONS
1) We will review your case at the transplant committee meeting and will review our decision with you in approximately 4 weeks over the phone.  2) If you do not receive a call from [facility] within 4 weeks, please call our local Nephrology office.  3) From a renal transplant evaluation purpose, we will see you once a year in our local office for medical follow-up.  4) Once we call you with our decision regarding further evaluation, you will receive further instruction over the phone and in the mail regarding the next steps to complete the transplant evaluation.  5) All of your non transplant evaluation follow up regarding your regular medical follow ups, your renal care follow ups, and any other specialists visits you are following with should continue as you are advised by those respective physicians and continue to follow with their management and care.  And if there is any emergency please go to the nearest urgent care or emergency room.

## 2025-04-08 ENCOUNTER — DOCUMENTATION (OUTPATIENT)
Dept: ADMINISTRATIVE | Facility: OTHER | Age: 70
End: 2025-04-08

## 2025-04-08 NOTE — PROGRESS NOTES
04/08/25 1:39 PM    Annual Wellness Visit outreach is not required, an AWV was completed at the PCP office.    Thank you.  Mirna Sage MA  [facility]

## 2025-04-10 ENCOUNTER — OFFICE VISIT (OUTPATIENT)
Facility: HOSPITAL | Age: 70
End: 2025-04-10
Payer: COMMERCIAL

## 2025-04-10 DIAGNOSIS — Z98.890 HISTORY OF ILEAL CONDUIT: Primary | ICD-10-CM

## 2025-04-10 DIAGNOSIS — Z71.89 ENCOUNTER FOR OSTOMY NURSE CONSULTATION: ICD-10-CM

## 2025-04-10 DIAGNOSIS — C54.1 ENDOMETRIAL CANCER (HCC): Chronic | ICD-10-CM

## 2025-04-10 PROCEDURE — 99213 OFFICE O/P EST LOW 20 MIN: CPT

## 2025-04-10 PROCEDURE — 99203 OFFICE O/P NEW LOW 30 MIN: CPT

## 2025-04-10 NOTE — PROGRESS NOTES
Patient ID: Ragini Melendez is a 69 y.o. female Date of Birth 1955       Chief Complaint   Patient presents with    New Patient Visit     Peristomal skin redness       Allergies:  Cephalosporins    Diagnosis:      Diagnosis ICD-10-CM Associated Orders   1. History of ileal conduit  Z98.890 Wound cleansing and dressings      2. Encounter for ostomy nurse consultation  Z71.89       3. Endometrial cancer (HCC)  C54.1               Assessment & Plan:  Patient seen in office today in conjunction with WOCN.   Suspect waxing and waning skin breakdown and itching is related to patient leaving her barrier on for too long leaving her skin exposure to moisture (urine). Recommend for patient to change her pouch every 3-4 days instead of weekly. There is no evidence of fungal rash or allergic contact dermatitis.   No clinical s/s of infection present.   Patient is discharged from ostomy clinic, may follow-up PRN. Call if questions or concerns arise.   Patient verbalized understanding of plan of care.           Subjective:   4/10/25: Patient is a 69 year old female who presents to the ostomy clinic with chief complaint of skin irritation and itching. Patient has a history of ileal conduit creation due to bilateral distal ureteral stricture secondary to endometrial carcinoma. Patient currently is wearing 1 piece flat convatec urostomy pouch, states that she changes the pouch once per week. Denies issues with leaking. Patient reports waxing and waning periods of skin irritation and itching, states that her skin has improved recently. WOCN reports significant washout present to the barrier during removal of ostomy pouch. Patient offers no other stomal or abdominal related complaints. No fever or chills.         The following portions of the patient's history were reviewed and updated as appropriate:   Patient Active Problem List   Diagnosis    Benign essential hypertension    Major depression, chronic    Dyslipidemia     Endometrial cancer (HCC)    Encounter for follow-up surveillance of endometrial cancer    Encounter for central line care    Chemotherapy induced neutropenia (HCC)    Anemia    Acute on chronic kidney disease    Bilateral lower extremity edema    Severe protein-calorie malnutrition (HCC)    Obstruction of right ureter    Overactive bladder    Acute left-sided low back pain without sciatica    Nephrostomy status (HCC)    Bilateral piriformis syndrome    Chronic pain syndrome    Myofascial pain syndrome    Pathological fracture due to osteoporosis    Osteoporosis    Dental disorder    Need for follow-up by     Closed nondisplaced fracture of right pubis with delayed healing    Chronic UTI    Depression, recurrent (HCC)    Vaginal atrophy    Nephrotoxicity    Dry mouth    Abnormal iron saturation    Hemorrhagic cystitis w/ pyelonephritis    History of DVT (deep vein thrombosis)    Other proteinuria    CKD (chronic kidney disease) stage 4, GFR 15-29 ml/min ([facility])    Preop cardiovascular exam    Left-sided chest wall pain    Ureteral stricture    Overweight with body mass index (BMI) of 28 to 28.9 in adult    Thrombocytopenia, unspecified (HCC)    Hypertensive CKD (chronic kidney disease)    Bilateral hydronephrosis    Anemia due to stage 4 chronic kidney disease  (HCC)     Past Medical History:   Diagnosis Date    Anemia     Anxiety     Cancer (HCC)     ENDOMETRIAL    Chemotherapy induced neutropenia (HCC) 08/30/2019    Chronic kidney disease     CKD (chronic kidney disease) stage 3, GFR 30-59 ml/min ([facility])     COVID     Fall 2022    Depression     DVT (deep venous thrombosis) (HCC) 07/19/2019    RIGHT LEG    Endometrial cancer (HCC) 12/2017    GERD (gastroesophageal reflux disease)     H/O gastric bypass     Hard to intubate     pt denies AS OF 7/25/23    History of chemotherapy     started 12/2021endometrial cancer- done 12/23/22    History of DVT in adulthood     RLE    History of transfusion 04/13/2023     Hyperglycemia     vx type 2 dm -- last assessed 4/1/14; resolved 11/7/17    Hyperlipidemia     Hypertension     in past- no meds at present    Iron deficiency anemia     iron infusions weekly    Iron deficiency anemia secondary to inadequate dietary iron intake 02/08/2023    OAB (overactive bladder)     Port-A-Cath in place     Wears glasses      Past Surgical History:   Procedure Laterality Date    ABDOMINAL SURGERY      GASTRIC BYPASS; 2001    BREAST BIOPSY Right 06/28/2019    core biopsy; benign    CHOLECYSTECTOMY      at the time of gastric bypass    COLONOSCOPY      CT NEEDLE BIOPSY LYMPH NODE  07/08/2019    CYSTECTOMY, RADICAL WITH ILEOCONDUIT N/A 10/24/2024    Procedure: ILEAL CONDUIT urinary diversion (No cystectomy), BILATERAL UTERO-LYSIS;  Surgeon: Demetrius Lowe MD;  [facility];  Service: Urology    FL GUIDED CENTRAL VENOUS ACCESS DEVICE INSERTION  11/12/2019    FL RETROGRADE PYELOGRAM  03/30/2023    FL RETROGRADE PYELOGRAM  05/09/2023    FL RETROGRADE PYELOGRAM  8/1/2023    GASTRIC BYPASS      HYSTERECTOMY Bilateral 2017    total abdominal with salpingo-oophorectomy    IR NEPHROSTOMY TO NEPHROURETERAL STENT  06/09/2022    IR NEPHROSTOMY TO NEPHROURETERAL STENT  12/20/2022    IR NEPHROSTOMY TO NEPHROURETERAL STENT  8/8/2023    IR NEPHROSTOMY TO NEPHROURETERAL STENT  12/4/2024    IR NEPHROSTOMY TO NEPHROURETERAL STENT  3/6/2025    IR NEPHROSTOMY TUBE CHECK/CHANGE/REPOSITION/REINSERTION/UPSIZE  05/27/2022    IR NEPHROSTOMY TUBE CHECK/CHANGE/REPOSITION/REINSERTION/UPSIZE  11/10/2023    IR NEPHROSTOMY TUBE CHECK/CHANGE/REPOSITION/REINSERTION/UPSIZE  2/9/2024    IR NEPHROSTOMY TUBE CHECK/CHANGE/REPOSITION/REINSERTION/UPSIZE  4/12/2024    IR NEPHROSTOMY TUBE CHECK/CHANGE/REPOSITION/REINSERTION/UPSIZE  7/12/2024    IR NEPHROSTOMY TUBE CHECK/CHANGE/REPOSITION/REINSERTION/UPSIZE  10/16/2024    IR NEPHROSTOMY TUBE CHECK/CHANGE/REPOSITION/REINSERTION/UPSIZE  11/6/2024    IR NEPHROSTOMY TUBE  PLACEMENT  07/26/2019    IR NEPHROSTOMY TUBE PLACEMENT  05/27/2023    IR NEPHROURETERAL STENT CHECK/CHANGE/REPOSITION  04/06/2021    IR NEPHROURETERAL STENT CHECK/CHANGE/REPOSITION  06/04/2021    IR NEPHROURETERAL STENT CHECK/CHANGE/REPOSITION  09/03/2021    IR NEPHROURETERAL STENT CHECK/CHANGE/REPOSITION  12/07/2021    IR NEPHROURETERAL STENT CHECK/CHANGE/REPOSITION  03/08/2022    IR NEPHROURETERAL STENT CHECK/CHANGE/REPOSITION  05/10/2022    IR NEPHROURETERAL STENT CHECK/CHANGE/REPOSITION  09/19/2022    IR PICC LINE  09/27/2019    IR PORT PLACEMENT  07/26/2019    IR PORT PLACEMENT      IR PORT REMOVAL  09/20/2019    OOPHORECTOMY Bilateral 2017    CO CYSTO W/INSERT URETERAL STENT Right 03/30/2023    Procedure: EXCHANGE STENT URETERAL;  Surgeon: Demetrius Lowe MD;  [facility];  Service: Urology    CO CYSTOURETHROSCOPY W/URETERAL CATHETERIZATION Right 03/30/2023    Procedure: CYSTOSCOPY RETROGRADE PYELOGRAM WITH exchange of STENT URETERAL;  Surgeon: Demetrius Lowe MD;  [facility];  Service: Urology    CO CYSTOURETHROSCOPY W/URETERAL CATHETERIZATION Bilateral 05/09/2023    Procedure: CYSTOSCOPY, BILATERAL RETROGRADE PYELOGRAM, WITH LEFT INSERTION STENT URETERAL, RIGHT URTERAL STENT EXCHANGE;  Surgeon: Nicola Hannah MD;  [facility];  Service: Urology    CO CYSTOURETHROSCOPY W/URETERAL CATHETERIZATION Bilateral 8/1/2023    Procedure: CYSTOSCOPY BILATERAL RETROGRADE  WITH REMOVAL BILATERAL  STENT URETERAL;  Surgeon: Demetrius Lowe MD;  [facility];  Service: Urology    CO INSJ TUNNELED CTR VAD W/SUBQ PORT AGE 7 YR/> Left 11/12/2019    Procedure: INSERTION VENOUS PORT ( PORT-A-CATH) IR;  Surgeon: Edilberto Guzman DO;  [facility];  Service: Interventional Radiology    CO LAPS ABD PRTM&OMENTUM DX W/WO SPEC BR/WA SPX N/A 12/19/2017    Procedure: LAPAROSCOPY DIAGNOSTIC;  Surgeon: Evgeny So MD;  [facility];  Service: Gynecology  Oncology    WY LAPS W/RAD HYST W/BILAT LMPHADEC RMVL TUBE/OVARY N/A 12/19/2017    Procedure: HYSTERECTOMY LAPAROSCOPIC TOTAL (LTH) W/ ROBOTICS; BILATERAL SALPINGOOPHERECTOMY; LYMPH NODE DISSECTION; lysis of adhesions;  Surgeon: Evgeny So MD;  [facility];  Service: Gynecology Oncology    REMOVAL URETERAL STENT Bilateral 8/1/2023    Procedure: REMOVAL STENT URETERAL;  Surgeon: Demetrius Lowe MD;  [facility];  Service: Urology    TONSILLECTOMY      US GUIDED BREAST BIOPSY RIGHT COMPLETE Right 06/28/2019     Family History   Problem Relation Age of Onset    Hyperlipidemia Mother     Heart disease Mother     Ovarian cancer Mother 50    Colon cancer Mother     Lymphoma Father     Breast cancer Sister 63    No Known Problems Brother     No Known Problems Brother     No Known Problems Maternal Grandmother     Bone cancer Maternal Grandfather     Uterine cancer Paternal Grandmother     No Known Problems Paternal Grandfather     No Known Problems Maternal Aunt     No Known Problems Maternal Aunt     No Known Problems Maternal Aunt     No Known Problems Maternal Aunt     No Known Problems Paternal Aunt     No Known Problems Paternal Aunt     No Known Problems Paternal Aunt     No Known Problems Paternal Aunt       Social History     Socioeconomic History    Marital status: Single     Spouse name: None    Number of children: None    Years of education: None    Highest education level: None   Occupational History    None   Tobacco Use    Smoking status: Never    Smokeless tobacco: Never   Vaping Use    Vaping status: Never Used   Substance and Sexual Activity    Alcohol use: Never    Drug use: Never    Sexual activity: Not Currently   Other Topics Concern    None   Social History Narrative    None     Social Drivers of Health     Financial Resource Strain: Low Risk  (9/26/2023)    Overall Financial Resource Strain (CARDIA)     Difficulty of Paying Living Expenses: Not very hard   Food  Insecurity: No Food Insecurity (3/27/2025)    Hunger Vital Sign     Worried About Running Out of Food in the Last Year: Never true     Ran Out of Food in the Last Year: Never true   Transportation Needs: No Transportation Needs (3/27/2025)    PRAPARE - Transportation     Lack of Transportation (Medical): No     Lack of Transportation (Non-Medical): No   Physical Activity: Not on file   Stress: Not on file   Social Connections: Not on file   Intimate Partner Violence: Not on file   Housing Stability: Low Risk  (3/27/2025)    Housing Stability Vital Sign     Unable to Pay for Housing in the Last Year: No     Number of Times Moved in the Last Year: 0     Homeless in the Last Year: No        Current Outpatient Medications:     acetaminophen (TYLENOL) 325 mg tablet, Take 2 tablets (650 mg total) by mouth every 6 (six) hours as needed for mild pain, Disp: , Rfl:     ARIPiprazole (ABILIFY) 20 MG tablet, Take 20 mg by mouth every morning, Disp: , Rfl:     BD PosiFlush 0.9 % SOLN, , Disp: , Rfl:     calcitriol (ROCALTROL) 0.25 mcg capsule, Take 1 capsule (0.25 mcg total) by mouth daily, Disp: 90 capsule, Rfl: 3    calcium acetate (PHOSLO) capsule, Take 2 capsules (1,334 mg total) by mouth 2 (two) times a day 2 capsules with lunch, 2 capsules with dinner, Disp: 180 capsule, Rfl: 3    Calcium Carbonate (CALCIUM 500 PO), Take 500 mg by mouth in the morning, Disp: , Rfl:     Calcium-Magnesium-Vitamin D (CALCIUM 500 PO), Take 1 capsule by mouth daily at bedtime, Disp: , Rfl:     cetirizine (ZyrTEC) 10 mg tablet, Take 1 tablet (10 mg total) by mouth daily, Disp: 90 tablet, Rfl: 1    cholecalciferol (VITAMIN D3) 1,000 units tablet, Take 4 tablets (4,000 Units total) by mouth daily, Disp: 90 tablet, Rfl: 0    Cyanocobalamin (VITAMIN B12 PO), Take 1 capsule by mouth daily after lunch, Disp: , Rfl:     docusate sodium (COLACE) 100 mg capsule, Take 1 capsule (100 mg total) by mouth 2 (two) times a day, Disp: 180 capsule, Rfl: 0     enoxaparin (Lovenox) 80 mg/0.8 mL, Inject 0.7 mL (70 mg total) under the skin every 24 hours (Patient not taking: Reported on 12/6/2024), Disp: 24 mL, Rfl: 0    fluticasone (FLONASE) 50 mcg/act nasal spray, 1 spray into each nostril daily, Disp: 15.8 mL, Rfl: 0    folic acid (FOLVITE) 1 mg tablet, Take 1 tablet (1 mg total) by mouth daily, Disp: , Rfl: 0    furosemide (LASIX) 20 mg tablet, TAKE 1 TABLET (20 MG TOTAL) BY MOUTH IF NEEDED (FOR INCREASED LEG SWELLING OR WEIGHT GAIN >2 LBS IN 1 DAY), Disp: 90 tablet, Rfl: 1    methocarbamol (ROBAXIN) 750 mg tablet, Take 1 tablet (750 mg total) by mouth every 6 (six) hours for 10 days (Patient not taking: Reported on 12/6/2024), Disp: 40 tablet, Rfl: 0    Multiple Vitamin (MULTIVITAMIN) tablet, Take 1 tablet by mouth every morning, Disp: , Rfl:     naloxone (NARCAN) 4 mg/0.1 mL nasal spray, Administer 1 spray into a nostril. If no response after 2-3 minutes, give another dose in the other nostril using a new spray. (Patient not taking: Reported on 12/6/2024), Disp: 1 each, Rfl: 1    omeprazole (PriLOSEC) 20 mg delayed release capsule, Take 20 mg by mouth as needed (GERD), Disp: , Rfl:     oxyCODONE-acetaminophen (Percocet) 5-325 mg per tablet, Take 1 tablet by mouth every 4 (four) hours as needed for moderate pain Max Daily Amount: 6 tablets (Patient not taking: Reported on 12/6/2024), Disp: 10 tablet, Rfl: 0    polyethylene glycol (MIRALAX) 17 g packet, Take 17 g by mouth daily for 7 days (Patient not taking: Reported on 3/13/2025), Disp: 119 g, Rfl: 0    sodium bicarbonate 650 mg tablet, TAKE 1 TABLET BY MOUTH THREE TIMES DAILY, Disp: 270 tablet, Rfl: 1    SODIUM CHLORIDE, EXTERNAL, 0.9 % SOLN, 10 mL by Intracatheter route daily. Intracatheter flushing daily. May substitute prefilled syringe with normal saline 10 mL vials, 10 mL syringes, and 18 g blunt needles, Disp: , Rfl:     sodium chloride, PF, 0.9 %, 10 mL by Intracatheter route daily Intracatheter flushing daily.  May substitute prefilled syringe with normal saline 10 mL vials, 10 mL syringes, and 18 g blunt needles (Patient not taking: Reported on 3/13/2025), Disp: 300 mL, Rfl: 0    sodium chloride, PF, 0.9 %, 10 mL by Intracatheter route daily for 120 doses Intracatheter flushing daily. May substitute prefilled syringe with normal saline 10 mL vials, 10 mL syringes, and 18 g blunt needles, Disp: 300 mL, Rfl: 3    venlafaxine (EFFEXOR-XR) 75 mg 24 hr capsule, TAKE 3 CAPS DAILY, Disp: , Rfl:     Review of Systems   Constitutional:  Negative for chills and fever.   HENT:  Negative for congestion and sneezing.    Respiratory:  Negative for cough and shortness of breath.    Cardiovascular:  Negative for chest pain.   Gastrointestinal:  Negative for abdominal distention and abdominal pain.   Genitourinary:  Negative for hematuria.   Skin:  Negative for color change, rash and wound.   Psychiatric/Behavioral:  Negative for agitation.        Objective:  LMP  (LMP Unknown)         Physical Exam  Constitutional:       General: She is awake. She is not in acute distress.     Appearance: She is obese. She is not ill-appearing, toxic-appearing or diaphoretic.   HENT:      Head: Normocephalic and atraumatic.      Right Ear: External ear normal.      Left Ear: External ear normal.   Eyes:      Conjunctiva/sclera: Conjunctivae normal.   Pulmonary:      Effort: Pulmonary effort is normal. No respiratory distress.   Abdominal:      General: A surgical scar is present. The ostomy site is clean. There is no distension.      Palpations: Abdomen is soft.      Tenderness: There is no abdominal tenderness. There is no guarding or rebound.      Hernia: No hernia is present.          Comments: Right lower quadrant urostomy present. Stoma is round shaped, budded, moist and red in color. Os is noted with centrally with stent in place (patient reports this is permanent). Urine noted to be coming from stent and from around the stent (contributory to  washout out of barrier). Stoma positive for clear yellow urine that is non-malodorous. Mucocutaneous junction is intact without separation.  No apparent hernia or prolapse present. See skin assessment for alise-stomal skin assessment details.   Musculoskeletal:      Cervical back: Neck supple.   Skin:     General: Skin is warm and dry.      Comments: Alise-stomal skin is intact with a few small areas of intact freshly new epithelialized skin, no drainage to areas. No evidence of fungal rash or allergic contact dermatitis.    Neurological:      Mental Status: She is alert.   Psychiatric:         Mood and Affect: Mood normal.         Behavior: Behavior normal. Behavior is cooperative.                    Lab Results   Component Value Date    HGBA1C 5.2 10/31/2022       Wound Instructions:  Orders Placed This Encounter   Procedures    Wound cleansing and dressings     Ostomy Care:  -Stoma Measurement: 1 inch    -Appliance Used During Bag Change: Convatec one piece urostomy pouch    *Can shower with pouch on or off. Make sure to dry off pouch after shower.     Pouch Change Steps:  1. Peel back pouch using push-pull method, may use non-alcohol adhesive remover. Remove pouch from top to bottom.   2. Use warm water only to cleanse skin around the stoma (alise-stomal skin).   3. Make sure all adhesive residue is removed and skin is dry and not oily.  4. Measure stoma size using measuring guide and trace correct measurements onto back of pouch.  5. Then cut or mold the backing of pouch out to correct shape/size.  6. Use 3M no sting barrier film to prep the skin around the stoma.  7. Place pouch over stoma and onto skin.  8. Use warmth of hand to apply gentle pressure to help backing of pouch to adhere well to skin.    TIPS:  Empty pouch when 1/3 -1/2 full.   Change pouch every 3-4 days or if signs of leaking.     Standing Status:   Future     Expiration Date:   4/17/2025           RAFITA Mike, ERNIE,  "CWON    Portions of the record may have been created with voice recognition software. Occasional wrong word or \"sound alike\" substitutions may have occurred due to the inherent limitations of voice recognition software. Read the chart carefully and recognize, using context, where substitutions have occurred.          Total time spent today:    Total time (face-to-face and non-face-to-face) spent on today's visit was 25 minutes. This includes preparation for the visits (i.e. reviewing test results from date recent hospitalizations, ER/Urgent Care/primary care visits and recent consultant office visits), performance of a medically appropriate history and examination, and orders for medications or testing.   "

## 2025-04-15 ENCOUNTER — OFFICE VISIT (OUTPATIENT)
Age: 70
End: 2025-04-15
Payer: COMMERCIAL

## 2025-04-15 VITALS
DIASTOLIC BLOOD PRESSURE: 84 MMHG | WEIGHT: 158 LBS | HEART RATE: 73 BPM | SYSTOLIC BLOOD PRESSURE: 145 MMHG | HEIGHT: 58 IN | BODY MASS INDEX: 33.17 KG/M2

## 2025-04-15 DIAGNOSIS — K59.00 CONSTIPATION, UNSPECIFIED CONSTIPATION TYPE: ICD-10-CM

## 2025-04-15 DIAGNOSIS — R10.30 LOWER ABDOMINAL PAIN: Primary | ICD-10-CM

## 2025-04-15 DIAGNOSIS — K21.9 GASTROESOPHAGEAL REFLUX DISEASE, UNSPECIFIED WHETHER ESOPHAGITIS PRESENT: ICD-10-CM

## 2025-04-15 PROCEDURE — 99214 OFFICE O/P EST MOD 30 MIN: CPT | Performed by: NURSE PRACTITIONER

## 2025-04-15 PROCEDURE — G2211 COMPLEX E/M VISIT ADD ON: HCPCS | Performed by: NURSE PRACTITIONER

## 2025-04-15 NOTE — PROGRESS NOTES
Name: Ragini Melendez      : 1955      MRN: [id number]  Encounter Provider: RAFITA Ramos  Encounter Date: 4/15/2025   Encounter department: [facility]  :  Assessment & Plan  Lower abdominal pain  EGD done 2023 showed previous gastric bypass surgery in stomach regular Z-line.. All observed locations appeared normal.  Colonoscopy done 2023 showed normal colonoscopy.  Patient reports intermittent episodes of lower abdominal pain.  Patient report 3 she noticed pain in her lower abdomen after eating cinnamon raisin toast with butter.  She stopped using butter and pain improved.  Patient also had a recent exploratory lap with ileal conduit done 10/29/2024 and has history of endometrial cancer.  Patient does have a right nephrostomy tube. Lower abdominal pain may be secondary to GERD, urinary/kidney etiology, recent surgery, adhesions, lesion,  constipation,or GYN etiology.  Orders:    CT abdomen pelvis wo contrast; Future  Follow-up with urologist  Follow-up with OB/GYN  Gastroesophageal reflux disease, unspecified whether esophagitis present  Patient has history of GERD.  Currently denies nausea, vomiting, acid reflux, heartburn, epigastric or abdominal pain.EGD done 2023 showed previous gastric bypass surgery in stomach regular Z-line. All observed locations appeared normal.    Follow antireflux diet       Constipation, unspecified constipation type  Reports intermittent episodes of constipation.  Patient will move her bowels at times every 2 days.  Patient is currently not taking anything for her bowels.  High-fiber diet  Start MiraLAX 1 capful daily in 8 ounces noncarbonated beverage may adjust for bowel movements       Follow up 6 months.    History of Present Illness   Abdominal Pain  Associated symptoms include constipation. Pertinent negatives include no arthralgias, dysuria, fever, hematuria or vomiting.   Constipation  Associated  symptoms include abdominal pain. Pertinent negatives include no back pain, fever or vomiting.     Ragini Melendez is a 69 y.o. female with past medical history of endometrial carcinoma s/p ASH/BSO followed by radiation and chemotherapy completed 2022, ureteral strictures from radiation, ureteral stents, nephrostomy tubes, left retrograde PCNU, exploratory laparotomy with ileal conduit 10/2024, DVT, gastric bypass who presents to office for follow-up.  Patient reports intermittent episodes of constipation and lower abdominal cramping. Patient reports intermittent episodes of lower abdominal pain.  Patient report 3 she noticed pain in her lower abdomen after eating cinnamon raisin toast with butter.  She stopped using butter and pain improved.  Patient also had a recent exploratory lap with ileal conduit done 10/29/2024 and has history of endometrial cancer.  Patient does have a right nephrostomy tube.  Patient denies nausea, acid reflux, heartburn, epigastric or upper abdominal pain.  Patient reports she moves her bowels daily to every 2 days.  Patient is currently taking no medication for her intermittent episodes of constipation.  Patient denies any blood in stool, blood from rectal area, or black tarry stool.  Abdomen exam benign no abdominal tenderness or guarding.  Reviewed recent lab work done 2/10/2025.  CBC hemoglobin 9.3 and stable.  No leukocytosis white blood count 9.14. BUN 33 and creatinine 1.91    EGD done 12/12/2023 showed previous gastric bypass surgery in stomach regular Z-line.. All observed locations appeared normal.  Colonoscopy done 12/12/2023 showed normal colonoscopy.     Review of Systems   Constitutional:  Negative for chills and fever.   HENT:  Negative for ear pain and sore throat.    Eyes:  Negative for pain and visual disturbance.   Respiratory:  Negative for cough and shortness of breath.    Cardiovascular:  Negative for chest pain and palpitations.   Gastrointestinal:  Positive for  abdominal pain and constipation. Negative for vomiting.   Genitourinary:  Negative for dysuria and hematuria.   Musculoskeletal:  Negative for arthralgias and back pain.   Skin:  Negative for color change and rash.   Neurological:  Negative for seizures and syncope.   All other systems reviewed and are negative.    Medical History Reviewed by provider this encounter:     .  Past Medical History   Past Medical History:   Diagnosis Date    Anemia     Anxiety     Cancer (HCC)     ENDOMETRIAL    Chemotherapy induced neutropenia (HCC) 08/30/2019    Chronic kidney disease     CKD (chronic kidney disease) stage 3, GFR 30-59 ml/min (HCC)     COVID     Fall 2022    Depression     DVT (deep venous thrombosis) (HCC) 07/19/2019    RIGHT LEG    Endometrial cancer (HCC) 12/2017    GERD (gastroesophageal reflux disease)     H/O gastric bypass     Hard to intubate     pt denies AS OF 7/25/23    History of chemotherapy     started 12/2021endometrial cancer- done 12/23/22    History of DVT in adulthood     RLE    History of transfusion 04/13/2023    Hyperglycemia     vx type 2 dm -- last assessed 4/1/14; resolved 11/7/17    Hyperlipidemia     Hypertension     in past- no meds at present    Iron deficiency anemia     iron infusions weekly    Iron deficiency anemia secondary to inadequate dietary iron intake 02/08/2023    OAB (overactive bladder)     Port-A-Cath in place     Wears glasses      Past Surgical History:   Procedure Laterality Date    ABDOMINAL SURGERY      GASTRIC BYPASS; 2001    BREAST BIOPSY Right 06/28/2019    core biopsy; benign    CHOLECYSTECTOMY      at the time of gastric bypass    COLONOSCOPY      CT NEEDLE BIOPSY LYMPH NODE  07/08/2019    CYSTECTOMY, RADICAL WITH ILEOCONDUIT N/A 10/24/2024    Procedure: ILEAL CONDUIT urinary diversion (No cystectomy), BILATERAL UTERO-LYSIS;  Surgeon: Demetrius Lowe MD;  [facility];  Service: Urology    FL GUIDED CENTRAL VENOUS ACCESS DEVICE INSERTION  11/12/2019     FL RETROGRADE PYELOGRAM  03/30/2023    FL RETROGRADE PYELOGRAM  05/09/2023    FL RETROGRADE PYELOGRAM  8/1/2023    GASTRIC BYPASS      HYSTERECTOMY Bilateral 2017    total abdominal with salpingo-oophorectomy    IR NEPHROSTOMY TO NEPHROURETERAL STENT  06/09/2022    IR NEPHROSTOMY TO NEPHROURETERAL STENT  12/20/2022    IR NEPHROSTOMY TO NEPHROURETERAL STENT  8/8/2023    IR NEPHROSTOMY TO NEPHROURETERAL STENT  12/4/2024    IR NEPHROSTOMY TO NEPHROURETERAL STENT  3/6/2025    IR NEPHROSTOMY TUBE CHECK/CHANGE/REPOSITION/REINSERTION/UPSIZE  05/27/2022    IR NEPHROSTOMY TUBE CHECK/CHANGE/REPOSITION/REINSERTION/UPSIZE  11/10/2023    IR NEPHROSTOMY TUBE CHECK/CHANGE/REPOSITION/REINSERTION/UPSIZE  2/9/2024    IR NEPHROSTOMY TUBE CHECK/CHANGE/REPOSITION/REINSERTION/UPSIZE  4/12/2024    IR NEPHROSTOMY TUBE CHECK/CHANGE/REPOSITION/REINSERTION/UPSIZE  7/12/2024    IR NEPHROSTOMY TUBE CHECK/CHANGE/REPOSITION/REINSERTION/UPSIZE  10/16/2024    IR NEPHROSTOMY TUBE CHECK/CHANGE/REPOSITION/REINSERTION/UPSIZE  11/6/2024    IR NEPHROSTOMY TUBE PLACEMENT  07/26/2019    IR NEPHROSTOMY TUBE PLACEMENT  05/27/2023    IR NEPHROURETERAL STENT CHECK/CHANGE/REPOSITION  04/06/2021    IR NEPHROURETERAL STENT CHECK/CHANGE/REPOSITION  06/04/2021    IR NEPHROURETERAL STENT CHECK/CHANGE/REPOSITION  09/03/2021    IR NEPHROURETERAL STENT CHECK/CHANGE/REPOSITION  12/07/2021    IR NEPHROURETERAL STENT CHECK/CHANGE/REPOSITION  03/08/2022    IR NEPHROURETERAL STENT CHECK/CHANGE/REPOSITION  05/10/2022    IR NEPHROURETERAL STENT CHECK/CHANGE/REPOSITION  09/19/2022    IR PICC LINE  09/27/2019    IR PORT PLACEMENT  07/26/2019    IR PORT PLACEMENT      IR PORT REMOVAL  09/20/2019    OOPHORECTOMY Bilateral 2017    MI CYSTO W/INSERT URETERAL STENT Right 03/30/2023    Procedure: EXCHANGE STENT URETERAL;  Surgeon: Demetrius Lowe MD;  [facility];  Service: Urology    MI CYSTOURETHROSCOPY W/URETERAL CATHETERIZATION Right 03/30/2023    Procedure:  CYSTOSCOPY RETROGRADE PYELOGRAM WITH exchange of STENT URETERAL;  Surgeon: Demetrius Lowe MD;  [facility];  Service: Urology    MT CYSTOURETHROSCOPY W/URETERAL CATHETERIZATION Bilateral 05/09/2023    Procedure: CYSTOSCOPY, BILATERAL RETROGRADE PYELOGRAM, WITH LEFT INSERTION STENT URETERAL, RIGHT URTERAL STENT EXCHANGE;  Surgeon: Nicola Hannah MD;  [facility];  Service: Urology    MT CYSTOURETHROSCOPY W/URETERAL CATHETERIZATION Bilateral 8/1/2023    Procedure: CYSTOSCOPY BILATERAL RETROGRADE  WITH REMOVAL BILATERAL  STENT URETERAL;  Surgeon: Demetrius Lowe MD;  [facility];  Service: Urology    MT INSJ TUNNELED CTR VAD W/SUBQ PORT AGE 7 YR/> Left 11/12/2019    Procedure: INSERTION VENOUS PORT ( PORT-A-CATH) IR;  Surgeon: Edilberto Guzman DO;  [facility];  Service: Interventional Radiology    MT LAPS ABD PRTM&OMENTUM DX W/WO SPEC BR/WA SPX N/A 12/19/2017    Procedure: LAPAROSCOPY DIAGNOSTIC;  Surgeon: Evgeny So MD;  [facility];  Service: Gynecology Oncology    MT LAPS W/RAD HYST W/BILAT LMPHADEC RMVL TUBE/OVARY N/A 12/19/2017    Procedure: HYSTERECTOMY LAPAROSCOPIC TOTAL (LTH) W/ ROBOTICS; BILATERAL SALPINGOOPHERECTOMY; LYMPH NODE DISSECTION; lysis of adhesions;  Surgeon: Evgeny So MD;  [facility];  Service: Gynecology Oncology    REMOVAL URETERAL STENT Bilateral 8/1/2023    Procedure: REMOVAL STENT URETERAL;  Surgeon: Demetrius Lowe MD;  [facility];  Service: Urology    TONSILLECTOMY      US GUIDED BREAST BIOPSY RIGHT COMPLETE Right 06/28/2019     Family History   Problem Relation Age of Onset    Hyperlipidemia Mother     Heart disease Mother     Ovarian cancer Mother 50    Colon cancer Mother     Lymphoma Father     Breast cancer Sister 63    No Known Problems Brother     No Known Problems Brother     No Known Problems Maternal Grandmother     Bone cancer Maternal Grandfather     Uterine cancer  Paternal Grandmother     No Known Problems Paternal Grandfather     No Known Problems Maternal Aunt     No Known Problems Maternal Aunt     No Known Problems Maternal Aunt     No Known Problems Maternal Aunt     No Known Problems Paternal Aunt     No Known Problems Paternal Aunt     No Known Problems Paternal Aunt     No Known Problems Paternal Aunt       reports that she has never smoked. She has never used smokeless tobacco. She reports that she does not drink alcohol and does not use drugs.  Current Outpatient Medications   Medication Instructions    acetaminophen (TYLENOL) 650 mg, Oral, Every 6 hours PRN    ARIPiprazole (ABILIFY) 20 mg, Every morning    BD PosiFlush 0.9 % SOLN No dose, route, or frequency recorded.    calcitriol (ROCALTROL) 0.25 mcg, Oral, Daily    calcium acetate (PHOSLO) 1,334 mg, Oral, 2 times daily, 2 capsules with lunch, 2 capsules with dinner    Calcium Carbonate (CALCIUM 500 PO) 500 mg, Daily    Calcium-Magnesium-Vitamin D (CALCIUM 500 PO) 1 capsule, Daily at bedtime    cetirizine (ZYRTEC) 10 mg, Oral, Daily    cholecalciferol (VITAMIN D3) 4,000 Units, Oral, Daily    Cyanocobalamin (VITAMIN B12 PO) 1 capsule, Daily after lunch    docusate sodium (COLACE) 100 mg, Oral, 2 times daily    enoxaparin (LOVENOX) 1 mg/kg, Subcutaneous, Every 24 hours    fluticasone (FLONASE) 50 mcg/act nasal spray 1 spray, Nasal, Daily    folic acid (FOLVITE) 1 mg, Oral, Daily    furosemide (LASIX) 20 mg, Oral, As needed    methocarbamol (ROBAXIN) 750 mg, Oral, Every 6 hours scheduled    Multiple Vitamin (MULTIVITAMIN) tablet 1 tablet, Every morning    naloxone (NARCAN) 4 mg/0.1 mL nasal spray Administer 1 spray into a nostril. If no response after 2-3 minutes, give another dose in the other nostril using a new spray.    omeprazole (PRILOSEC) 20 mg, As needed    oxyCODONE-acetaminophen (Percocet) 5-325 mg per tablet 1 tablet, Oral, Every 4 hours PRN    polyethylene glycol (MIRALAX) 17 g, Oral, Daily    sodium  bicarbonate 650 mg, Oral, 3 times daily    SODIUM CHLORIDE, EXTERNAL, 0.9 % SOLN 10 mL, Daily    sodium chloride, PF, 0.9 % 10 mL, Intracatheter, Daily, Intracatheter flushing daily. May substitute prefilled syringe with normal saline 10 mL vials, 10 mL syringes, and 18 g blunt needles    sodium chloride, PF, 0.9 % 10 mL, Intracatheter, Daily, Intracatheter flushing daily. May substitute prefilled syringe with normal saline 10 mL vials, 10 mL syringes, and 18 g blunt needles    venlafaxine (EFFEXOR-XR) 75 mg 24 hr capsule TAKE 3 CAPS DAILY     Allergies   Allergen Reactions    Cephalosporins Rash     Which Cephalosporin reaction was to not specified; however, has tolerated Amoxicillin, Cefazolin, and Cefepime      Current Outpatient Medications on File Prior to Visit   Medication Sig Dispense Refill    acetaminophen (TYLENOL) 325 mg tablet Take 2 tablets (650 mg total) by mouth every 6 (six) hours as needed for mild pain      ARIPiprazole (ABILIFY) 20 MG tablet Take 20 mg by mouth every morning      calcitriol (ROCALTROL) 0.25 mcg capsule Take 1 capsule (0.25 mcg total) by mouth daily 90 capsule 3    calcium acetate (PHOSLO) capsule Take 2 capsules (1,334 mg total) by mouth 2 (two) times a day 2 capsules with lunch, 2 capsules with dinner 180 capsule 3    Calcium Carbonate (CALCIUM 500 PO) Take 500 mg by mouth in the morning      cetirizine (ZyrTEC) 10 mg tablet Take 1 tablet (10 mg total) by mouth daily 90 tablet 1    cholecalciferol (VITAMIN D3) 1,000 units tablet Take 4 tablets (4,000 Units total) by mouth daily 90 tablet 0    Cyanocobalamin (VITAMIN B12 PO) Take 1 capsule by mouth daily after lunch      docusate sodium (COLACE) 100 mg capsule Take 1 capsule (100 mg total) by mouth 2 (two) times a day 180 capsule 0    folic acid (FOLVITE) 1 mg tablet Take 1 tablet (1 mg total) by mouth daily  0    furosemide (LASIX) 20 mg tablet TAKE 1 TABLET (20 MG TOTAL) BY MOUTH IF NEEDED (FOR INCREASED LEG SWELLING OR WEIGHT  GAIN >2 LBS IN 1 DAY) 90 tablet 1    Multiple Vitamin (MULTIVITAMIN) tablet Take 1 tablet by mouth every morning      omeprazole (PriLOSEC) 20 mg delayed release capsule Take 20 mg by mouth as needed (GERD)      polyethylene glycol (MIRALAX) 17 g packet Take 17 g by mouth daily for 7 days 119 g 0    sodium bicarbonate 650 mg tablet TAKE 1 TABLET BY MOUTH THREE TIMES DAILY 270 tablet 1    venlafaxine (EFFEXOR-XR) 75 mg 24 hr capsule TAKE 3 CAPS DAILY      BD PosiFlush 0.9 % SOLN  (Patient not taking: Reported on 11/5/2024)      Calcium-Magnesium-Vitamin D (CALCIUM 500 PO) Take 1 capsule by mouth daily at bedtime (Patient not taking: Reported on 4/15/2025)      enoxaparin (Lovenox) 80 mg/0.8 mL Inject 0.7 mL (70 mg total) under the skin every 24 hours (Patient not taking: Reported on 12/6/2024) 24 mL 0    fluticasone (FLONASE) 50 mcg/act nasal spray 1 spray into each nostril daily (Patient not taking: Reported on 4/15/2025) 15.8 mL 0    methocarbamol (ROBAXIN) 750 mg tablet Take 1 tablet (750 mg total) by mouth every 6 (six) hours for 10 days (Patient not taking: Reported on 4/15/2025) 40 tablet 0    naloxone (NARCAN) 4 mg/0.1 mL nasal spray Administer 1 spray into a nostril. If no response after 2-3 minutes, give another dose in the other nostril using a new spray. (Patient not taking: Reported on 12/6/2024) 1 each 1    oxyCODONE-acetaminophen (Percocet) 5-325 mg per tablet Take 1 tablet by mouth every 4 (four) hours as needed for moderate pain Max Daily Amount: 6 tablets (Patient not taking: Reported on 12/6/2024) 10 tablet 0    SODIUM CHLORIDE, EXTERNAL, 0.9 % SOLN 10 mL by Intracatheter route daily. Intracatheter flushing daily. May substitute prefilled syringe with normal saline 10 mL vials, 10 mL syringes, and 18 g blunt needles      sodium chloride, PF, 0.9 % 10 mL by Intracatheter route daily Intracatheter flushing daily. May substitute prefilled syringe with normal saline 10 mL vials, 10 mL syringes, and 18 g  "blunt needles (Patient not taking: Reported on 3/13/2025) 300 mL 0    sodium chloride, PF, 0.9 % 10 mL by Intracatheter route daily for 120 doses Intracatheter flushing daily. May substitute prefilled syringe with normal saline 10 mL vials, 10 mL syringes, and 18 g blunt needles 300 mL 3     No current facility-administered medications on file prior to visit.      Social History     Tobacco Use    Smoking status: Never    Smokeless tobacco: Never   Vaping Use    Vaping status: Never Used   Substance and Sexual Activity    Alcohol use: Never    Drug use: Never    Sexual activity: Not Currently        Objective   /84 (BP Location: Right arm, Patient Position: Sitting, Cuff Size: Standard)   Pulse 73   Ht 4' 10\" (1.473 m)   Wt 71.7 kg (158 lb)   LMP  (LMP Unknown)   BMI 33.02 kg/m²      Physical Exam  Vitals and nursing note reviewed.   Constitutional:       General: She is not in acute distress.     Appearance: She is well-developed.   HENT:      Head: Normocephalic and atraumatic.   Eyes:      Conjunctiva/sclera: Conjunctivae normal.   Cardiovascular:      Rate and Rhythm: Normal rate and regular rhythm.      Pulses: Normal pulses.      Heart sounds: Normal heart sounds. No murmur heard.  Pulmonary:      Effort: Pulmonary effort is normal. No respiratory distress.      Breath sounds: Normal breath sounds. No stridor. No wheezing, rhonchi or rales.   Abdominal:      General: Bowel sounds are normal. There is no distension.      Palpations: Abdomen is soft. There is no mass.      Tenderness: There is no abdominal tenderness. There is no guarding or rebound.   Genitourinary:     Comments: Ileal conduit stoma pink and moist.  Patent for clear yellow urine  Musculoskeletal:         General: No swelling.      Cervical back: Neck supple.      Right lower leg: No edema.      Left lower leg: No edema.   Skin:     General: Skin is warm and dry.      Capillary Refill: Capillary refill takes less than 2 seconds.      " Coloration: Skin is not jaundiced or pale.   Neurological:      Mental Status: She is alert and oriented to person, place, and time.   Psychiatric:         Mood and Affect: Mood normal.

## 2025-04-15 NOTE — ASSESSMENT & PLAN NOTE
EGD done 12/12/2023 showed previous gastric bypass surgery in stomach regular Z-line.. All observed locations appeared normal.  Colonoscopy done 12/12/2023 showed normal colonoscopy.  Patient reports intermittent episodes of lower abdominal pain.  Patient report 3 she noticed pain in her lower abdomen after eating cinnamon raisin toast with butter.  She stopped using butter and pain improved.  Patient also had a recent exploratory lap with ileal conduit done 10/29/2024 and has history of endometrial cancer.  Patient does have a right nephrostomy tube. Lower abdominal pain may be secondary to GERD, urinary/kidney etiology, recent surgery, adhesions, lesion,  constipation,or GYN etiology.  Orders:    CT abdomen pelvis wo contrast; Future  Follow-up with urologist  Follow-up with OB/GYN

## 2025-04-15 NOTE — PATIENT INSTRUCTIONS
MiraLAX 1 capful in 8 ounces noncarbonated beverage daily at bedtime and may adjust the bowel movements.  High fiber diet  Follow antireflux diet and measures 8 ounce cups daily, avoid carbonated beverages spicy, and fatty foods

## 2025-04-15 NOTE — ASSESSMENT & PLAN NOTE
Patient has history of GERD.  Currently denies nausea, vomiting, acid reflux, heartburn, epigastric or abdominal pain.EGD done 12/12/2023 showed previous gastric bypass surgery in stomach regular Z-line. All observed locations appeared normal.    Follow antireflux diet

## 2025-04-17 ENCOUNTER — OFFICE VISIT (OUTPATIENT)
Dept: UROLOGY | Facility: AMBULATORY SURGERY CENTER | Age: 70
End: 2025-04-17
Payer: COMMERCIAL

## 2025-04-17 VITALS
HEIGHT: 58 IN | BODY MASS INDEX: 33.17 KG/M2 | OXYGEN SATURATION: 100 % | SYSTOLIC BLOOD PRESSURE: 132 MMHG | DIASTOLIC BLOOD PRESSURE: 82 MMHG | HEART RATE: 87 BPM | WEIGHT: 158 LBS

## 2025-04-17 DIAGNOSIS — N13.5 URETERAL STRICTURE: Primary | ICD-10-CM

## 2025-04-17 PROCEDURE — 99213 OFFICE O/P EST LOW 20 MIN: CPT | Performed by: UROLOGY

## 2025-04-17 NOTE — PROGRESS NOTES
Name: Ragini Melendez      : 1955      MRN: [id number]  Encounter Provider: Demetrius Lowe MD  Encounter Date: 2025   Encounter department: [facility] [place]  :  Assessment & Plan  Ureteral stricture           Impression: History of gynecological malignancy/endometrial carcinoma, status post ASH/BSO with radiation, development of bilateral radiation-induced ureteral strictures, status post ileal conduit urinary diversion, stage IV chronic kidney disease    Plan: I recommend maintaining an internal single-J stent on the left side.  This can be changed in interventional radiology.  I also recommend maintaining the right nephrostomy tube in place at this time as she continues to make significant urine from the right side despite her atrophic right kidney.  She will continue to follow with gynecological oncology as well as nephrology.  I recommend that she return in 6 months time or sooner if needed.    History of Present Illness   Ragini Melendez is a 69 y.o. female who presents in follow-up today after undergoing creation of an ileal conduit.  Her underlying pathology is a history of gynecological malignancy status post pelvic radiation.  She developed bilateral distal ureteral strictures.  She failed ureteral stents.  She recently underwent creation of an ileal conduit.  She developed stricturing of the left ureteral ileal anastomosis and required a left nephrostomy tube which has since been converted to an internal single-J stent.  Her right ureter was extremely atrophic and her right side has been managed with a indwelling nephrostomy tube.  The right kidney is relatively atrophic but still makes almost 400 cc of urine per day.  Her creatinine is just under 2 but GFR is under 30.  She has been evaluated for possible renal transplant in the future.  She states that overall her quality of life has improved since the left nephrostomy tube was internalized.  She states the  "right is still somewhat uncomfortable but tolerable.    Review of Systems       Objective   /82 (BP Location: Left arm, Patient Position: Sitting, Cuff Size: Standard)   Pulse 87   Ht 4' 10\" (1.473 m)   Wt 71.7 kg (158 lb)   LMP  (LMP Unknown)   SpO2 100%   BMI 33.02 kg/m²     Physical Exam on examination she is in no acute distress.  Her abdomen is soft nontender nondistended.  Stoma is pink and viable with a single-J ureteral stent noted.  A right nephrostomy tube is in place.    Results   No results found for: \"PSA\"  Lab Results   Component Value Date    GLUCOSE 219 (H) 12/19/2017    CALCIUM 8.9 03/27/2025     10/27/2017    K 3.7 03/27/2025    CO2 22 03/27/2025     03/27/2025    BUN 47 (H) 03/27/2025    CREATININE 2.07 (H) 03/27/2025     Lab Results   Component Value Date    WBC 9.14 02/10/2025    HGB 9.3 (L) 02/10/2025    HCT 30.7 (L) 02/10/2025    MCV 93 02/10/2025     02/10/2025       Office Urine Dip  No results found for this or any previous visit (from the past hour).      "

## 2025-04-24 ENCOUNTER — TELEPHONE (OUTPATIENT)
Age: 70
End: 2025-04-24

## 2025-04-24 NOTE — TELEPHONE ENCOUNTER
Patient called in to advise that [facility] still has not reached out to her. Patient is asking if we can check on this and call her back.

## 2025-04-25 ENCOUNTER — TELEPHONE (OUTPATIENT)
Dept: INFUSION CENTER | Facility: CLINIC | Age: 70
End: 2025-04-25

## 2025-04-25 NOTE — TELEPHONE ENCOUNTER
Patient called to reschedule appointment today Friday 4/25.  Patient did not give a reason for cancellation. Appointment was reschedule patient is understanding of new date and time.

## 2025-05-02 ENCOUNTER — HOSPITAL ENCOUNTER (OUTPATIENT)
Dept: CT IMAGING | Facility: HOSPITAL | Age: 70
Discharge: HOME | End: 2025-05-02
Attending: NURSE PRACTITIONER
Payer: COMMERCIAL

## 2025-05-02 ENCOUNTER — HOSPITAL ENCOUNTER (OUTPATIENT)
Dept: INFUSION CENTER | Facility: CLINIC | Age: 70
Discharge: HOME | End: 2025-05-02
Payer: COMMERCIAL

## 2025-05-02 DIAGNOSIS — Z45.2 ENCOUNTER FOR CENTRAL LINE CARE: Primary | ICD-10-CM

## 2025-05-02 DIAGNOSIS — R10.30 LOWER ABDOMINAL PAIN: ICD-10-CM

## 2025-05-02 DIAGNOSIS — C54.1 ENDOMETRIAL CANCER (HCC): ICD-10-CM

## 2025-05-02 PROCEDURE — 96523 IRRIG DRUG DELIVERY DEVICE: CPT

## 2025-05-02 PROCEDURE — 74176 CT ABD & PELVIS W/O CONTRAST: CPT

## 2025-05-02 NOTE — PROGRESS NOTES
Patient arrives for port flush. Left port accessed by CLAU RN, brisk blood return noted, flushed per protocol. Confirmed next appt for 6/16 @ 9am. Declined AVS.

## 2025-05-06 ENCOUNTER — APPOINTMENT (EMERGENCY)
Dept: CT IMAGING | Facility: HOSPITAL | Age: 70
DRG: 698 | End: 2025-05-06
Payer: COMMERCIAL

## 2025-05-06 ENCOUNTER — HOSPITAL ENCOUNTER (INPATIENT)
Facility: HOSPITAL | Age: 70
LOS: 4 days | Discharge: HOME | DRG: 698 | End: 2025-05-11
Attending: EMERGENCY MEDICINE | Admitting: INTERNAL MEDICINE
Payer: COMMERCIAL

## 2025-05-06 DIAGNOSIS — N13.5 URETERAL STRICTURE: ICD-10-CM

## 2025-05-06 DIAGNOSIS — T83.098A MALFUNCTION OF NEPHROSTOMY TUBE (HCC): ICD-10-CM

## 2025-05-06 DIAGNOSIS — N17.9 AKI (ACUTE KIDNEY INJURY) (HCC): ICD-10-CM

## 2025-05-06 DIAGNOSIS — R10.9 LEFT FLANK PAIN: ICD-10-CM

## 2025-05-06 DIAGNOSIS — E87.20 METABOLIC ACIDOSIS: ICD-10-CM

## 2025-05-06 DIAGNOSIS — N12 PYELONEPHRITIS: Primary | ICD-10-CM

## 2025-05-06 PROBLEM — R79.89 ELEVATED SERUM CREATININE: Status: ACTIVE | Noted: 2025-05-06

## 2025-05-06 PROBLEM — R65.10 SIRS (SYSTEMIC INFLAMMATORY RESPONSE SYNDROME) (HCC): Status: ACTIVE | Noted: 2025-05-06

## 2025-05-06 LAB
ALBUMIN SERPL BCG-MCNC: 4.2 G/DL (ref 3.5–5)
ALP SERPL-CCNC: 110 U/L (ref 34–104)
ALT SERPL W P-5'-P-CCNC: 29 U/L (ref 7–52)
AMORPH URATE CRY URNS QL MICRO: ABNORMAL
ANION GAP SERPL CALCULATED.3IONS-SCNC: 13 MMOL/L (ref 4–13)
APTT PPP: 29 SECONDS (ref 23–34)
AST SERPL W P-5'-P-CCNC: 18 U/L (ref 13–39)
BACTERIA UR QL AUTO: ABNORMAL /HPF
BASOPHILS # BLD MANUAL: 0 THOUSAND/UL (ref 0–0.1)
BASOPHILS NFR MAR MANUAL: 0 % (ref 0–1)
BILIRUB SERPL-MCNC: 0.41 MG/DL (ref 0.2–1)
BILIRUB UR QL STRIP: NEGATIVE
BUN SERPL-MCNC: 48 MG/DL (ref 5–25)
CALCIUM SERPL-MCNC: 9.4 MG/DL (ref 8.4–10.2)
CHLORIDE SERPL-SCNC: 104 MMOL/L (ref 96–108)
CLARITY UR: ABNORMAL
CO2 SERPL-SCNC: 17 MMOL/L (ref 21–32)
COLOR UR: ABNORMAL
CREAT SERPL-MCNC: 2.75 MG/DL (ref 0.6–1.3)
EOSINOPHIL # BLD MANUAL: 0 THOUSAND/UL (ref 0–0.4)
EOSINOPHIL NFR BLD MANUAL: 0 % (ref 0–6)
ERYTHROCYTE [DISTWIDTH] IN BLOOD BY AUTOMATED COUNT: 13.2 % (ref 11.6–15.1)
GFR SERPL CREATININE-BSD FRML MDRD: 17 ML/MIN/1.73SQ M
GLUCOSE SERPL-MCNC: 118 MG/DL (ref 65–140)
GLUCOSE UR STRIP-MCNC: NEGATIVE MG/DL
HCT VFR BLD AUTO: 39.2 % (ref 34.8–46.1)
HGB BLD-MCNC: 12.3 G/DL (ref 11.5–15.4)
HGB UR QL STRIP.AUTO: ABNORMAL
INR PPP: 0.98 (ref 0.85–1.19)
KETONES UR STRIP-MCNC: NEGATIVE MG/DL
LACTATE SERPL-SCNC: 1.2 MMOL/L (ref 0.5–2)
LEUKOCYTE ESTERASE UR QL STRIP: ABNORMAL
LYMPHOCYTES # BLD AUTO: 0.23 THOUSAND/UL (ref 0.6–4.47)
LYMPHOCYTES # BLD AUTO: 1 % (ref 14–44)
MCH RBC QN AUTO: 29.4 PG (ref 26.8–34.3)
MCHC RBC AUTO-ENTMCNC: 31.4 G/DL (ref 31.4–37.4)
MCV RBC AUTO: 94 FL (ref 82–98)
MONOCYTES # BLD AUTO: 1.4 THOUSAND/UL (ref 0–1.22)
MONOCYTES NFR BLD: 6 % (ref 4–12)
NEUTROPHILS # BLD MANUAL: 21.72 THOUSAND/UL (ref 1.85–7.62)
NEUTS BAND NFR BLD MANUAL: 13 % (ref 0–8)
NEUTS SEG NFR BLD AUTO: 80 % (ref 43–75)
NITRITE UR QL STRIP: NEGATIVE
NON-SQ EPI CELLS URNS QL MICRO: ABNORMAL /HPF
PH UR STRIP.AUTO: 8.5 [PH]
PLATELET # BLD AUTO: 188 THOUSANDS/UL (ref 149–390)
PLATELET # BLD AUTO: 216 THOUSANDS/UL (ref 149–390)
PLATELET BLD QL SMEAR: ADEQUATE
PMV BLD AUTO: 9.4 FL (ref 8.9–12.7)
PMV BLD AUTO: 9.5 FL (ref 8.9–12.7)
POTASSIUM SERPL-SCNC: 4.2 MMOL/L (ref 3.5–5.3)
PROT SERPL-MCNC: 8.2 G/DL (ref 6.4–8.4)
PROT UR STRIP-MCNC: ABNORMAL MG/DL
PROTHROMBIN TIME: 13.7 SECONDS (ref 12.3–15)
RBC # BLD AUTO: 4.19 MILLION/UL (ref 3.81–5.12)
RBC #/AREA URNS AUTO: ABNORMAL /HPF
RBC MORPH BLD: NORMAL
SODIUM SERPL-SCNC: 134 MMOL/L (ref 135–147)
SP GR UR STRIP.AUTO: 1.01 (ref 1–1.03)
TRI-PHOS CRY URNS QL MICRO: ABNORMAL /HPF
UROBILINOGEN UR STRIP-ACNC: <2 MG/DL
WBC # BLD AUTO: 23.35 THOUSAND/UL (ref 4.31–10.16)
WBC #/AREA URNS AUTO: ABNORMAL /HPF

## 2025-05-06 PROCEDURE — 36415 COLL VENOUS BLD VENIPUNCTURE: CPT | Performed by: PHYSICIAN ASSISTANT

## 2025-05-06 PROCEDURE — 99285 EMERGENCY DEPT VISIT HI MDM: CPT | Performed by: PHYSICIAN ASSISTANT

## 2025-05-06 PROCEDURE — 85049 AUTOMATED PLATELET COUNT: CPT

## 2025-05-06 PROCEDURE — 87040 BLOOD CULTURE FOR BACTERIA: CPT | Performed by: PHYSICIAN ASSISTANT

## 2025-05-06 PROCEDURE — 87154 CUL TYP ID BLD PTHGN 6+ TRGT: CPT | Performed by: PHYSICIAN ASSISTANT

## 2025-05-06 PROCEDURE — 85730 THROMBOPLASTIN TIME PARTIAL: CPT | Performed by: PHYSICIAN ASSISTANT

## 2025-05-06 PROCEDURE — 81001 URINALYSIS AUTO W/SCOPE: CPT

## 2025-05-06 PROCEDURE — 80053 COMPREHEN METABOLIC PANEL: CPT | Performed by: PHYSICIAN ASSISTANT

## 2025-05-06 PROCEDURE — 85007 BL SMEAR W/DIFF WBC COUNT: CPT | Performed by: PHYSICIAN ASSISTANT

## 2025-05-06 PROCEDURE — 85027 COMPLETE CBC AUTOMATED: CPT | Performed by: PHYSICIAN ASSISTANT

## 2025-05-06 PROCEDURE — 83605 ASSAY OF LACTIC ACID: CPT | Performed by: PHYSICIAN ASSISTANT

## 2025-05-06 PROCEDURE — 74176 CT ABD & PELVIS W/O CONTRAST: CPT

## 2025-05-06 PROCEDURE — 87186 SC STD MICRODIL/AGAR DIL: CPT | Performed by: PHYSICIAN ASSISTANT

## 2025-05-06 PROCEDURE — 87077 CULTURE AEROBIC IDENTIFY: CPT | Performed by: PHYSICIAN ASSISTANT

## 2025-05-06 PROCEDURE — 85610 PROTHROMBIN TIME: CPT | Performed by: PHYSICIAN ASSISTANT

## 2025-05-06 PROCEDURE — 99284 EMERGENCY DEPT VISIT MOD MDM: CPT

## 2025-05-06 RX ORDER — ACETAMINOPHEN 325 MG/1
650 TABLET ORAL EVERY 6 HOURS PRN
Status: DISCONTINUED | OUTPATIENT
Start: 2025-05-06 | End: 2025-05-11 | Stop reason: HOSPADM

## 2025-05-06 RX ORDER — LEVOFLOXACIN 5 MG/ML
500 INJECTION, SOLUTION INTRAVENOUS
Status: DISCONTINUED | OUTPATIENT
Start: 2025-05-08 | End: 2025-05-07

## 2025-05-06 RX ORDER — CALCIUM ACETATE 667 MG/1
1334 CAPSULE ORAL 2 TIMES DAILY
Status: DISCONTINUED | OUTPATIENT
Start: 2025-05-06 | End: 2025-05-11 | Stop reason: HOSPADM

## 2025-05-06 RX ORDER — HEPARIN SODIUM 5000 [USP'U]/ML
5000 INJECTION, SOLUTION INTRAVENOUS; SUBCUTANEOUS EVERY 8 HOURS SCHEDULED
Status: DISCONTINUED | OUTPATIENT
Start: 2025-05-06 | End: 2025-05-11 | Stop reason: HOSPADM

## 2025-05-06 RX ORDER — LEVOFLOXACIN 5 MG/ML
750 INJECTION, SOLUTION INTRAVENOUS ONCE
Status: DISCONTINUED | OUTPATIENT
Start: 2025-05-06 | End: 2025-05-06

## 2025-05-06 RX ORDER — LEVOFLOXACIN 5 MG/ML
750 INJECTION, SOLUTION INTRAVENOUS ONCE
Status: COMPLETED | OUTPATIENT
Start: 2025-05-06 | End: 2025-05-06

## 2025-05-06 RX ORDER — SODIUM BICARBONATE 650 MG/1
650 TABLET ORAL 3 TIMES DAILY
Status: DISCONTINUED | OUTPATIENT
Start: 2025-05-06 | End: 2025-05-08

## 2025-05-06 RX ORDER — ARIPIPRAZOLE 5 MG/1
20 TABLET ORAL
Status: DISCONTINUED | OUTPATIENT
Start: 2025-05-06 | End: 2025-05-11 | Stop reason: HOSPADM

## 2025-05-06 RX ORDER — FOLIC ACID 1 MG/1
1 TABLET ORAL DAILY
Status: DISCONTINUED | OUTPATIENT
Start: 2025-05-07 | End: 2025-05-11 | Stop reason: HOSPADM

## 2025-05-06 RX ORDER — CALCIUM CARBONATE 500(1250)
1 TABLET ORAL
Status: DISCONTINUED | OUTPATIENT
Start: 2025-05-07 | End: 2025-05-11 | Stop reason: HOSPADM

## 2025-05-06 RX ADMIN — SODIUM CHLORIDE 500 ML: 0.9 INJECTION, SOLUTION INTRAVENOUS at 19:52

## 2025-05-06 RX ADMIN — LEVOFLOXACIN 750 MG: 5 INJECTION, SOLUTION INTRAVENOUS at 21:12

## 2025-05-06 NOTE — ED PROVIDER NOTES
Time reflects when diagnosis was documented in both MDM as applicable and the Disposition within this note       Time User Action Codes Description Comment    5/6/2025  7:37 PM Marisa Chaudhary Add [N12] Pyelonephritis     5/6/2025  8:00 PM Marisa Chaudhary Add [N17.9] [place] (acute kidney injury) (HCC)           ED Disposition       ED Disposition   Admit    Condition   Stable    Date/Time   Tue May 6, 2025  7:37 PM    Comment   Case was discussed with DAVID and the patient's admission status was agreed to be Admission Status: observation status to the service of Dr. Cabezas .               Assessment & Plan       Medical Decision Making  Differential diagnosis includes but not limited to: UTI, ureteritis, pyelonephritis, displaced nephrostomy tube, sepsis, other intra-abdominal infection.    Problems Addressed:  Pyelonephritis: acute illness or injury    Amount and/or Complexity of Data Reviewed  Labs: ordered. Decision-making details documented in ED Course.  Radiology: ordered. Decision-making details documented in ED Course.    Risk  Prescription drug management.  Decision regarding hospitalization.        ED Course as of 05/06/25 2001   Tue May 06, 2025   1801 Discussed care with urology and interventional radiology.      Per urology any urine sample obtained from the nephrostomy tube will likely be colonized.    Interventional radiology aware.  If patient is to be discharged home will see as an outpatient for tube change tomorrow.  If admitted can place consult for interventional radiology and they will make arrangements to see the patient on an inpatient basis tomorrow.         Medications   levofloxacin (LEVAQUIN) IVPB (premix in dextrose) 750 mg 150 mL (has no administration in time range)   sodium chloride 0.9 % bolus 500 mL (500 mL Intravenous New Bag 5/6/25 1952)       ED Risk Strat Scores                    No data recorded                            History of Present Illness       Chief Complaint   Patient  presents with    Back Pain     Per pt, back pressure at site of internal nephrostomy tube. Concern tube needs to be replaced       Past Medical History:   Diagnosis Date    Anemia     Anxiety     Cancer (HCC)     ENDOMETRIAL    Chemotherapy induced neutropenia (HCC) 08/30/2019    Chronic kidney disease     CKD (chronic kidney disease) stage 3, GFR 30-59 ml/min (HCC)     COVID     Fall 2022    Depression     DVT (deep venous thrombosis) (HCC) 07/19/2019    RIGHT LEG    Endometrial cancer (HCC) 12/2017    GERD (gastroesophageal reflux disease)     H/O gastric bypass     Hard to intubate     pt denies AS OF 7/25/23    History of chemotherapy     started 12/2021endometrial cancer- done 12/23/22    History of DVT in adulthood     RLE    History of transfusion 04/13/2023    Hyperglycemia     vx type 2 dm -- last assessed 4/1/14; resolved 11/7/17    Hyperlipidemia     Hypertension     in past- no meds at present    Iron deficiency anemia     iron infusions weekly    Iron deficiency anemia secondary to inadequate dietary iron intake 02/08/2023    OAB (overactive bladder)     Port-A-Cath in place     Wears glasses       Past Surgical History:   Procedure Laterality Date    ABDOMINAL SURGERY      GASTRIC BYPASS; 2001    BREAST BIOPSY Right 06/28/2019    core biopsy; benign    CHOLECYSTECTOMY      at the time of gastric bypass    COLONOSCOPY      CT NEEDLE BIOPSY LYMPH NODE  07/08/2019    CYSTECTOMY, RADICAL WITH ILEOCONDUIT N/A 10/24/2024    Procedure: ILEAL CONDUIT urinary diversion (No cystectomy), BILATERAL UTERO-LYSIS;  Surgeon: Demetrius Lowe MD;  [facility];  Service: Urology    FL GUIDED CENTRAL VENOUS ACCESS DEVICE INSERTION  11/12/2019    FL RETROGRADE PYELOGRAM  03/30/2023    FL RETROGRADE PYELOGRAM  05/09/2023    FL RETROGRADE PYELOGRAM  8/1/2023    GASTRIC BYPASS      HYSTERECTOMY Bilateral 2017    total abdominal with salpingo-oophorectomy    IR NEPHROSTOMY TO NEPHROURETERAL STENT  06/09/2022     IR NEPHROSTOMY TO NEPHROURETERAL STENT  12/20/2022    IR NEPHROSTOMY TO NEPHROURETERAL STENT  8/8/2023    IR NEPHROSTOMY TO NEPHROURETERAL STENT  12/4/2024    IR NEPHROSTOMY TO NEPHROURETERAL STENT  3/6/2025    IR NEPHROSTOMY TUBE CHECK/CHANGE/REPOSITION/REINSERTION/UPSIZE  05/27/2022    IR NEPHROSTOMY TUBE CHECK/CHANGE/REPOSITION/REINSERTION/UPSIZE  11/10/2023    IR NEPHROSTOMY TUBE CHECK/CHANGE/REPOSITION/REINSERTION/UPSIZE  2/9/2024    IR NEPHROSTOMY TUBE CHECK/CHANGE/REPOSITION/REINSERTION/UPSIZE  4/12/2024    IR NEPHROSTOMY TUBE CHECK/CHANGE/REPOSITION/REINSERTION/UPSIZE  7/12/2024    IR NEPHROSTOMY TUBE CHECK/CHANGE/REPOSITION/REINSERTION/UPSIZE  10/16/2024    IR NEPHROSTOMY TUBE CHECK/CHANGE/REPOSITION/REINSERTION/UPSIZE  11/6/2024    IR NEPHROSTOMY TUBE PLACEMENT  07/26/2019    IR NEPHROSTOMY TUBE PLACEMENT  05/27/2023    IR NEPHROURETERAL STENT CHECK/CHANGE/REPOSITION  04/06/2021    IR NEPHROURETERAL STENT CHECK/CHANGE/REPOSITION  06/04/2021    IR NEPHROURETERAL STENT CHECK/CHANGE/REPOSITION  09/03/2021    IR NEPHROURETERAL STENT CHECK/CHANGE/REPOSITION  12/07/2021    IR NEPHROURETERAL STENT CHECK/CHANGE/REPOSITION  03/08/2022    IR NEPHROURETERAL STENT CHECK/CHANGE/REPOSITION  05/10/2022    IR NEPHROURETERAL STENT CHECK/CHANGE/REPOSITION  09/19/2022    IR PICC LINE  09/27/2019    IR PORT PLACEMENT  07/26/2019    IR PORT PLACEMENT      IR PORT REMOVAL  09/20/2019    OOPHORECTOMY Bilateral 2017    VT CYSTO W/INSERT URETERAL STENT Right 03/30/2023    Procedure: EXCHANGE STENT URETERAL;  Surgeon: Demetrius Lowe MD;  [facility];  Service: Urology    VT CYSTOURETHROSCOPY W/URETERAL CATHETERIZATION Right 03/30/2023    Procedure: CYSTOSCOPY RETROGRADE PYELOGRAM WITH exchange of STENT URETERAL;  Surgeon: Demetrius Lowe MD;  [facility];  Service: Urology    VT CYSTOURETHROSCOPY W/URETERAL CATHETERIZATION Bilateral 05/09/2023    Procedure: CYSTOSCOPY, BILATERAL RETROGRADE  PYELOGRAM, WITH LEFT INSERTION STENT URETERAL, RIGHT URTERAL STENT EXCHANGE;  Surgeon: Nicola Hannah MD;  [facility];  Service: Urology    NJ CYSTOURETHROSCOPY W/URETERAL CATHETERIZATION Bilateral 8/1/2023    Procedure: CYSTOSCOPY BILATERAL RETROGRADE  WITH REMOVAL BILATERAL  STENT URETERAL;  Surgeon: Demetrius Lowe MD;  [facility];  Service: Urology    NJ INSJ TUNNELED CTR VAD W/SUBQ PORT AGE 5 YR/> Left 11/12/2019    Procedure: INSERTION VENOUS PORT ( PORT-A-CATH) IR;  Surgeon: Edilberto Guzman DO;  [facility];  Service: Interventional Radiology    NJ LAPS ABD PRTM&OMENTUM DX W/WO SPEC BR/WA SPX N/A 12/19/2017    Procedure: LAPAROSCOPY DIAGNOSTIC;  Surgeon: Evgeny So MD;  [facility];  Service: Gynecology Oncology    NJ LAPS W/RAD HYST W/BILAT LMPHADEC RMVL TUBE/OVARY N/A 12/19/2017    Procedure: HYSTERECTOMY LAPAROSCOPIC TOTAL (LTH) W/ ROBOTICS; BILATERAL SALPINGOOPHERECTOMY; LYMPH NODE DISSECTION; lysis of adhesions;  Surgeon: Evgeny So MD;  [facility];  Service: Gynecology Oncology    REMOVAL URETERAL STENT Bilateral 8/1/2023    Procedure: REMOVAL STENT URETERAL;  Surgeon: Demetrius Lowe MD;  [facility];  Service: Urology    TONSILLECTOMY      US GUIDED BREAST BIOPSY RIGHT COMPLETE Right 06/28/2019      Family History   Problem Relation Age of Onset    Hyperlipidemia Mother     Heart disease Mother     Ovarian cancer Mother 50    Colon cancer Mother     Lymphoma Father     Breast cancer Sister 63    No Known Problems Brother     No Known Problems Brother     No Known Problems Maternal Grandmother     Bone cancer Maternal Grandfather     Uterine cancer Paternal Grandmother     No Known Problems Paternal Grandfather     No Known Problems Maternal Aunt     No Known Problems Maternal Aunt     No Known Problems Maternal Aunt     No Known Problems Maternal Aunt     No Known Problems Paternal Aunt     No Known Problems  Paternal Aunt     No Known Problems Paternal Aunt     No Known Problems Paternal Aunt       Social History     Tobacco Use    Smoking status: Never    Smokeless tobacco: Never   Vaping Use    Vaping status: Never Used   Substance Use Topics    Alcohol use: Never    Drug use: Never      E-Cigarette/Vaping    E-Cigarette Use Never User       E-Cigarette/Vaping Substances    Nicotine No     THC No     CBD No     Flavoring No     Other No     Unknown No       I have reviewed and agree with the history as documented.     69-year-old female presents the emergency department with complaints of left-sided flank pain.  States that she has had some worsening left-sided flank pain over the course of the day.  Thought initially that it could be musculoskeletal in nature but states that symptoms continued to worsen despite use of Tylenol at home.  She denies fevers or chills.  Patient has a complicated past medical history for endometrial cancer with resection and subsequent right nephrostomy tube and left tunneled nephrostomy tubes to an ileostomy bag.  States that the tubes are changed every 3 months through interventional radiology.  Has noticed that over the course of the day as pain got worse that the drainage from her left tube has been lessened in amount.  Denies changes in the color or content of the urine.  Additionally notes that she has been having some vague abdominal discomfort for the past week or so.  History of previous gastric bypass surgery several years ago.  Did have an outpatient CT of her abdomen pelvis on 5/2/2025 with oral contrast that has not been read by radiology at this time.      History provided by:  Patient   used: No    Back Pain  Associated symptoms: abdominal pain    Associated symptoms: no chest pain, no dysuria, no fever, no headaches, no numbness and no weakness        Review of Systems   Constitutional:  Negative for activity change, appetite change, chills and fever.    HENT:  Negative for congestion, dental problem, drooling, ear discharge, ear pain, mouth sores, nosebleeds, rhinorrhea, sore throat and trouble swallowing.    Eyes:  Negative for pain, discharge and itching.   Respiratory:  Negative for cough, chest tightness, shortness of breath and wheezing.    Cardiovascular:  Negative for chest pain and palpitations.   Gastrointestinal:  Positive for abdominal pain. Negative for blood in stool, constipation, diarrhea, nausea and vomiting.   Endocrine: Negative for cold intolerance and heat intolerance.   Genitourinary:  Positive for flank pain. Negative for difficulty urinating, dysuria, frequency and urgency.   Musculoskeletal:  Positive for back pain.   Skin:  Negative for rash and wound.   Allergic/Immunologic: Negative for food allergies and immunocompromised state.   Neurological:  Negative for dizziness, seizures, syncope, weakness, numbness and headaches.   Psychiatric/Behavioral:  Negative for agitation, behavioral problems and confusion.            Objective       ED Triage Vitals   Temperature Pulse Blood Pressure Respirations SpO2 Patient Position - Orthostatic VS   05/06/25 1605 05/06/25 1605 05/06/25 1605 05/06/25 1605 05/06/25 1605 05/06/25 1605   (!) 97.4 °F (36.3 °C) 72 130/62 18 97 % Sitting      Temp Source Heart Rate Source BP Location FiO2 (%) Pain Score    05/06/25 1605 05/06/25 1605 05/06/25 1605 -- 05/06/25 1906    Oral Monitor Right arm  6      Vitals      Date and Time Temp Pulse SpO2 Resp BP Pain Score FACES Pain Rating User   05/06/25 1928 -- 92 -- -- -- -- -- GMS   05/06/25 1906 -- 89 96 % -- 136/65 6 -- KB   05/06/25 1605 97.4 °F (36.3 °C) 72 97 % 18 130/62 -- -- EM            Physical Exam  Vitals and nursing note reviewed.   Constitutional:       General: She is not in acute distress.     Appearance: She is not diaphoretic.   HENT:      Head: Normocephalic and atraumatic.      Right Ear: External ear normal.      Left Ear: External ear normal.       Mouth/Throat:      Mouth: Mucous membranes are dry.   Eyes:      Conjunctiva/sclera: Conjunctivae normal.   Neck:      Vascular: No JVD.      Trachea: No tracheal deviation.   Cardiovascular:      Rate and Rhythm: Normal rate and regular rhythm.      Heart sounds: Normal heart sounds. No murmur heard.     No friction rub. No gallop.   Pulmonary:      Effort: Pulmonary effort is normal. No respiratory distress.      Breath sounds: Normal breath sounds. No wheezing or rales.   Chest:      Chest wall: No tenderness.   Abdominal:      General: Bowel sounds are normal. There is no distension.      Palpations: Abdomen is soft.      Tenderness: There is abdominal tenderness. There is left CVA tenderness. There is no right CVA tenderness or guarding.   Musculoskeletal:         General: No tenderness or deformity. Normal range of motion.   Lymphadenopathy:      Cervical: No cervical adenopathy.   Skin:     General: Skin is warm and dry.      Findings: No erythema or rash.   Neurological:      Mental Status: She is alert and oriented to person, place, and time.   Psychiatric:         Mood and Affect: Mood normal.         Behavior: Behavior normal.         Results Reviewed       Procedure Component Value Units Date/Time    Comprehensive metabolic panel [[id number]]  (Abnormal) Collected: 05/06/25 1813    Lab Status: Final result Specimen: Blood from Arm, Left Updated: 05/06/25 1922     Sodium 134 mmol/L      Potassium 4.2 mmol/L      Chloride 104 mmol/L      CO2 17 mmol/L      ANION GAP 13 mmol/L      BUN 48 mg/dL      Creatinine 2.75 mg/dL      Glucose 118 mg/dL      Calcium 9.4 mg/dL      AST 18 U/L      ALT 29 U/L      Alkaline Phosphatase 110 U/L      Total Protein 8.2 g/dL      Albumin 4.2 g/dL      Total Bilirubin 0.41 mg/dL      eGFR 17 ml/min/1.73sq m     Narrative:      [employer] guidelines for Chronic Kidney Disease (CKD):     Stage 1 with normal or high GFR (GFR > 90 mL/min/1.73 square  meters)    Stage 2 Mild CKD (GFR = 60-89 mL/min/1.73 square meters)    Stage 3A Moderate CKD (GFR = 45-59 mL/min/1.73 square meters)    Stage 3B Moderate CKD (GFR = 30-44 mL/min/1.73 square meters)    Stage 4 Severe CKD (GFR = 15-29 mL/min/1.73 square meters)    Stage 5 End Stage CKD (GFR <15 mL/min/1.73 square meters)  Note: GFR calculation is accurate only with a steady state creatinine    Blood culture #1 [[id number]]     Lab Status: No result Specimen: Blood     Blood culture #2 [[id number]]     Lab Status: No result Specimen: Blood     Protime-INR [[id number]]     Lab Status: No result Specimen: Blood     APTT [[id number]]     Lab Status: No result Specimen: Blood     Lactic acid, plasma (w/reflex if result > 2.0) [[id number]]     Lab Status: No result Specimen: Blood     CBC and differential [[id number]]  (Abnormal) Collected: 05/06/25 1813    Lab Status: Final result Specimen: Blood from Arm, Left Updated: 05/06/25 1913     WBC 23.35 Thousand/uL      RBC 4.19 Million/uL      Hemoglobin 12.3 g/dL      Hematocrit 39.2 %      MCV 94 fL      MCH 29.4 pg      MCHC 31.4 g/dL      RDW 13.2 %      MPV 9.4 fL      Platelets 216 Thousands/uL     Narrative:      This is an appended report.  These results have been appended to a previously verified report.    Manual Differential(PHLEBS Do Not Order) [[id number]]  (Abnormal) Collected: 05/06/25 1813    Lab Status: Final result Specimen: Blood from Arm, Left Updated: 05/06/25 1913     Segmented % 80 %      Bands % 13 %      Lymphocytes % 1 %      Monocytes % 6 %      Eosinophils % 0 %      Basophils % 0 %      Absolute Neutrophils 21.72 Thousand/uL      Absolute Lymphocytes 0.23 Thousand/uL      Absolute Monocytes 1.40 Thousand/uL      Absolute Eosinophils 0.00 Thousand/uL      Absolute Basophils 0.00 Thousand/uL      Total Counted --     RBC Morphology Normal     Platelet Estimate Adequate    RBC Morphology Reflex Test [[id number]] Collected: 05/06/25 1813    Lab Status: In  process Specimen: Blood from Arm, Left Updated: 05/06/25 1912            CT abdomen pelvis wo contrast   Final Interpretation by Dulce Duggan MD (05/06 1834)      Ileal conduit. Left ureteral stent extends from the left renal hilum through the conduit and stoma.      Mild left hydronephrosis without hydroureter. Moderate left perinephric inflammatory fat stranding without fluid collection. Findings may represent collecting system obstruction and/or urinary tract infection/pyelonephritis.      Possible pancolitis.      Workstation performed: [id number]             Procedures    ED Medication and Procedure Management   Prior to Admission Medications   Prescriptions Last Dose Informant Patient Reported? Taking?   ARIPiprazole (ABILIFY) 20 MG tablet  Self Yes No   Sig: Take 20 mg by mouth every morning   BD PosiFlush 0.9 % SOLN  Self Yes No   Patient not taking: Reported on 11/5/2024   Calcium Carbonate (CALCIUM 500 PO)  Self Yes No   Sig: Take 500 mg by mouth in the morning   Calcium-Magnesium-Vitamin D (CALCIUM 500 PO)  Self Yes No   Sig: Take 1 capsule by mouth daily at bedtime   Cyanocobalamin (VITAMIN B12 PO)  Self Yes No   Sig: Take 1 capsule by mouth daily after lunch   Multiple Vitamin (MULTIVITAMIN) tablet  Self Yes No   Sig: Take 1 tablet by mouth every morning   SODIUM CHLORIDE, EXTERNAL, 0.9 % SOLN  Self Yes No   Sig: 10 mL by Intracatheter route daily. Intracatheter flushing daily. May substitute prefilled syringe with normal saline 10 mL vials, 10 mL syringes, and 18 g blunt needles   acetaminophen (TYLENOL) 325 mg tablet  Self No No   Sig: Take 2 tablets (650 mg total) by mouth every 6 (six) hours as needed for mild pain   calcitriol (ROCALTROL) 0.25 mcg capsule  Self No No   Sig: Take 1 capsule (0.25 mcg total) by mouth daily   Patient not taking: Reported on 4/17/2025   calcium acetate (PHOSLO) capsule  Self No No   Sig: Take 2 capsules (1,334 mg total) by mouth 2 (two) times a day 2 capsules with  lunch, 2 capsules with dinner   cetirizine (ZyrTEC) 10 mg tablet  Self No No   Sig: Take 1 tablet (10 mg total) by mouth daily   cholecalciferol (VITAMIN D3) 1,000 units tablet  Self No No   Sig: Take 4 tablets (4,000 Units total) by mouth daily   docusate sodium (COLACE) 100 mg capsule  Self No No   Sig: Take 1 capsule (100 mg total) by mouth 2 (two) times a day   enoxaparin (Lovenox) 80 mg/0.8 mL  Self No No   Sig: Inject 0.7 mL (70 mg total) under the skin every 24 hours   Patient not taking: Reported on 2024   fluticasone (FLONASE) 50 mcg/act nasal spray  Self No No   Si spray into each nostril daily   Patient not taking: Reported on 4/15/2025   folic acid (FOLVITE) 1 mg tablet  Self No No   Sig: Take 1 tablet (1 mg total) by mouth daily   furosemide (LASIX) 20 mg tablet  Self No No   Sig: TAKE 1 TABLET (20 MG TOTAL) BY MOUTH IF NEEDED (FOR INCREASED LEG SWELLING OR WEIGHT GAIN >2 LBS IN 1 DAY)   methocarbamol (ROBAXIN) 750 mg tablet   No No   Sig: Take 1 tablet (750 mg total) by mouth every 6 (six) hours for 10 days   Patient not taking: Reported on 4/15/2025   naloxone (NARCAN) 4 mg/0.1 mL nasal spray  Self No No   Sig: Administer 1 spray into a nostril. If no response after 2-3 minutes, give another dose in the other nostril using a new spray.   Patient not taking: Reported on 2024   omeprazole (PriLOSEC) 20 mg delayed release capsule  Self Yes No   Sig: Take 20 mg by mouth as needed (GERD)   oxyCODONE-acetaminophen (Percocet) 5-325 mg per tablet  Self No No   Sig: Take 1 tablet by mouth every 4 (four) hours as needed for moderate pain Max Daily Amount: 6 tablets   Patient not taking: Reported on 2024   polyethylene glycol (MIRALAX) 17 g packet  Self No No   Sig: Take 17 g by mouth daily for 7 days   sodium bicarbonate 650 mg tablet  Self No No   Sig: TAKE 1 TABLET BY MOUTH THREE TIMES DAILY   sodium chloride, PF, 0.9 %  Self No No   Sig: 10 mL by Intracatheter route daily Intracatheter  flushing daily. May substitute prefilled syringe with normal saline 10 mL vials, 10 mL syringes, and 18 g blunt needles   Patient not taking: Reported on 3/13/2025   sodium chloride, PF, 0.9 %  Self No No   Sig: 10 mL by Intracatheter route daily for 120 doses Intracatheter flushing daily. May substitute prefilled syringe with normal saline 10 mL vials, 10 mL syringes, and 18 g blunt needles   venlafaxine (EFFEXOR-XR) 75 mg 24 hr capsule  Self Yes No   Sig: TAKE 3 CAPS DAILY      Facility-Administered Medications: None     Patient's Medications   Discharge Prescriptions    No medications on file     No discharge procedures on file.  ED SEPSIS DOCUMENTATION   Time reflects when diagnosis was documented in both MDM as applicable and the Disposition within this note       Time User Action Codes Description Comment    5/6/2025  7:37 PM Marisa Chaudhary Add [N12] Pyelonephritis     5/6/2025  8:00 PM Marisa Chaudhary Add [N17.9] [place] (acute kidney injury) (HCC)            Initial Sepsis Screening       Row Name 05/06/25 1911                Is the patient's history suggestive of a new or worsening infection? Yes (Proceed)  -GS        Suspected source of infection urinary tract infection  -GS        Indicate SIRS criteria Leukocytosis (WBC > 91346 IJL) OR Leukopenia (WBC <4000 IJL) OR Bandemia (WBC >10% bands);Tachycardia > 90 bpm  -GS        Are two or more of the above signs & symptoms of infection both present and new to the patient? --                  User Key  (r) = Recorded By, (t) = Taken By, (c) = Cosigned By      Initials Name Provider Type    GS Marisa Chaudhary PA-C Physician Assistant                       Marisa Chaudhary PA-C  05/06/25 2000       Marisa Chaudhary PA-C  05/06/25 2001

## 2025-05-06 NOTE — Clinical Note
Case was discussed with DAVID and the patient's admission status was agreed to be Admission Status: inpatient status to the service of Dr. Cabezas .

## 2025-05-06 NOTE — SEPSIS NOTE
Sepsis Note   Ragini Melendez 69 y.o. female MRN: [id number]  Unit/Bed#: ED-32 Encounter: [id number]       Initial Sepsis Screening       Row Name 05/06/25 1911                Is the patient's history suggestive of a new or worsening infection? Yes (Proceed)  -GS        Suspected source of infection urinary tract infection  -GS        Indicate SIRS criteria Leukocytosis (WBC > 13176 IJL) OR Leukopenia (WBC <4000 IJL) OR Bandemia (WBC >10% bands);Tachycardia > 90 bpm  -GS        Are two or more of the above signs & symptoms of infection both present and new to the patient? --                  User Key  (r) = Recorded By, (t) = Taken By, (c) = Cosigned By      Initials Name Provider Type    GS Mairsa Chaudhary PA-C Physician Assistant                        There is no height or weight on file to calculate BMI.  Wt Readings from Last 1 Encounters:   04/17/25 71.7 kg (158 lb)        Ideal body weight: 47.1 kg (103 lb 14.5 oz)  Adjusted ideal body weight: 56.9 kg (125 lb 8.7 oz)     Detail Level: Zone Photo Preface (Leave Blank If You Do Not Want): Photographs were taken today Details (Free Text): Left scalp

## 2025-05-07 ENCOUNTER — APPOINTMENT (OUTPATIENT)
Dept: RADIOLOGY | Facility: HOSPITAL | Age: 70
DRG: 698 | End: 2025-05-07
Payer: COMMERCIAL

## 2025-05-07 ENCOUNTER — RESULTS FOLLOW-UP (OUTPATIENT)
Dept: GASTROENTEROLOGY | Facility: CLINIC | Age: 70
End: 2025-05-07

## 2025-05-07 PROBLEM — N18.9 CKD (CHRONIC KIDNEY DISEASE): Status: ACTIVE | Noted: 2024-05-02

## 2025-05-07 LAB
ANION GAP SERPL CALCULATED.3IONS-SCNC: 11 MMOL/L (ref 4–13)
BASOPHILS # BLD MANUAL: 0 THOUSAND/UL (ref 0–0.1)
BASOPHILS NFR MAR MANUAL: 0 % (ref 0–1)
BUN SERPL-MCNC: 56 MG/DL (ref 5–25)
CALCIUM SERPL-MCNC: 8.6 MG/DL (ref 8.4–10.2)
CHLORIDE SERPL-SCNC: 107 MMOL/L (ref 96–108)
CO2 SERPL-SCNC: 16 MMOL/L (ref 21–32)
CREAT SERPL-MCNC: 3.17 MG/DL (ref 0.6–1.3)
EOSINOPHIL # BLD MANUAL: 0 THOUSAND/UL (ref 0–0.4)
EOSINOPHIL NFR BLD MANUAL: 0 % (ref 0–6)
ERYTHROCYTE [DISTWIDTH] IN BLOOD BY AUTOMATED COUNT: 13.1 % (ref 11.6–15.1)
GFR SERPL CREATININE-BSD FRML MDRD: 14 ML/MIN/1.73SQ M
GLUCOSE P FAST SERPL-MCNC: 131 MG/DL (ref 65–99)
GLUCOSE SERPL-MCNC: 131 MG/DL (ref 65–140)
HCT VFR BLD AUTO: 33.7 % (ref 34.8–46.1)
HGB BLD-MCNC: 10.9 G/DL (ref 11.5–15.4)
LYMPHOCYTES # BLD AUTO: 0.51 THOUSAND/UL (ref 0.6–4.47)
LYMPHOCYTES # BLD AUTO: 2 % (ref 14–44)
MAGNESIUM SERPL-MCNC: 1.7 MG/DL (ref 1.9–2.7)
MCH RBC QN AUTO: 29.5 PG (ref 26.8–34.3)
MCHC RBC AUTO-ENTMCNC: 32.3 G/DL (ref 31.4–37.4)
MCV RBC AUTO: 91 FL (ref 82–98)
MONOCYTES # BLD AUTO: 1.02 THOUSAND/UL (ref 0–1.22)
MONOCYTES NFR BLD: 4 % (ref 4–12)
NEUTROPHILS # BLD MANUAL: 23.9 THOUSAND/UL (ref 1.85–7.62)
NEUTS BAND NFR BLD MANUAL: 7 % (ref 0–8)
NEUTS SEG NFR BLD AUTO: 87 % (ref 43–75)
PLATELET # BLD AUTO: 190 THOUSANDS/UL (ref 149–390)
PLATELET BLD QL SMEAR: ADEQUATE
PMV BLD AUTO: 9.7 FL (ref 8.9–12.7)
POTASSIUM SERPL-SCNC: 4.2 MMOL/L (ref 3.5–5.3)
RBC # BLD AUTO: 3.69 MILLION/UL (ref 3.81–5.12)
RBC MORPH BLD: NORMAL
SODIUM SERPL-SCNC: 134 MMOL/L (ref 135–147)
WBC # BLD AUTO: 25.43 THOUSAND/UL (ref 4.31–10.16)

## 2025-05-07 PROCEDURE — 85027 COMPLETE CBC AUTOMATED: CPT

## 2025-05-07 PROCEDURE — C1729 CATH, DRAINAGE: HCPCS

## 2025-05-07 PROCEDURE — 50688 CHANGE OF URETER TUBE/STENT: CPT

## 2025-05-07 PROCEDURE — 87205 SMEAR GRAM STAIN: CPT | Performed by: STUDENT IN AN ORGANIZED HEALTH CARE EDUCATION/TRAINING PROGRAM

## 2025-05-07 PROCEDURE — 75984 XRAY CONTROL CATHETER CHANGE: CPT

## 2025-05-07 PROCEDURE — 83735 ASSAY OF MAGNESIUM: CPT

## 2025-05-07 PROCEDURE — 80048 BASIC METABOLIC PNL TOTAL CA: CPT

## 2025-05-07 PROCEDURE — 50688 CHANGE OF URETER TUBE/STENT: CPT | Performed by: STUDENT IN AN ORGANIZED HEALTH CARE EDUCATION/TRAINING PROGRAM

## 2025-05-07 PROCEDURE — 50435 EXCHANGE NEPHROSTOMY CATH: CPT | Performed by: STUDENT IN AN ORGANIZED HEALTH CARE EDUCATION/TRAINING PROGRAM

## 2025-05-07 PROCEDURE — NC001 PR NO CHARGE: Performed by: STUDENT IN AN ORGANIZED HEALTH CARE EDUCATION/TRAINING PROGRAM

## 2025-05-07 PROCEDURE — 87185 SC STD ENZYME DETCJ PER NZM: CPT | Performed by: STUDENT IN AN ORGANIZED HEALTH CARE EDUCATION/TRAINING PROGRAM

## 2025-05-07 PROCEDURE — 75984 XRAY CONTROL CATHETER CHANGE: CPT | Performed by: STUDENT IN AN ORGANIZED HEALTH CARE EDUCATION/TRAINING PROGRAM

## 2025-05-07 PROCEDURE — 87076 CULTURE ANAEROBE IDENT EACH: CPT | Performed by: STUDENT IN AN ORGANIZED HEALTH CARE EDUCATION/TRAINING PROGRAM

## 2025-05-07 PROCEDURE — 87077 CULTURE AEROBIC IDENTIFY: CPT | Performed by: STUDENT IN AN ORGANIZED HEALTH CARE EDUCATION/TRAINING PROGRAM

## 2025-05-07 PROCEDURE — C1769 GUIDE WIRE: HCPCS

## 2025-05-07 PROCEDURE — 87186 SC STD MICRODIL/AGAR DIL: CPT | Performed by: STUDENT IN AN ORGANIZED HEALTH CARE EDUCATION/TRAINING PROGRAM

## 2025-05-07 PROCEDURE — 87075 CULTR BACTERIA EXCEPT BLOOD: CPT | Performed by: STUDENT IN AN ORGANIZED HEALTH CARE EDUCATION/TRAINING PROGRAM

## 2025-05-07 PROCEDURE — 85007 BL SMEAR W/DIFF WBC COUNT: CPT

## 2025-05-07 PROCEDURE — 99223 1ST HOSP IP/OBS HIGH 75: CPT | Performed by: INTERNAL MEDICINE

## 2025-05-07 PROCEDURE — 50435 EXCHANGE NEPHROSTOMY CATH: CPT

## 2025-05-07 PROCEDURE — 0T25X0Z CHANGE DRAINAGE DEVICE IN KIDNEY, EXTERNAL APPROACH: ICD-10-PCS | Performed by: STUDENT IN AN ORGANIZED HEALTH CARE EDUCATION/TRAINING PROGRAM

## 2025-05-07 PROCEDURE — 87070 CULTURE OTHR SPECIMN AEROBIC: CPT | Performed by: STUDENT IN AN ORGANIZED HEALTH CARE EDUCATION/TRAINING PROGRAM

## 2025-05-07 RX ORDER — MAGNESIUM SULFATE HEPTAHYDRATE 40 MG/ML
2 INJECTION, SOLUTION INTRAVENOUS ONCE
Status: COMPLETED | OUTPATIENT
Start: 2025-05-07 | End: 2025-05-08

## 2025-05-07 RX ORDER — SACCHAROMYCES BOULARDII 250 MG
250 CAPSULE ORAL 2 TIMES DAILY
Status: DISCONTINUED | OUTPATIENT
Start: 2025-05-07 | End: 2025-05-07

## 2025-05-07 RX ORDER — LIDOCAINE WITH 8.4% SOD BICARB 0.9%(10ML)
SYRINGE (ML) INJECTION AS NEEDED
Status: COMPLETED | OUTPATIENT
Start: 2025-05-07 | End: 2025-05-07

## 2025-05-07 RX ORDER — SODIUM CHLORIDE, SODIUM GLUCONATE, SODIUM ACETATE, POTASSIUM CHLORIDE, MAGNESIUM CHLORIDE, SODIUM PHOSPHATE, DIBASIC, AND POTASSIUM PHOSPHATE .53; .5; .37; .037; .03; .012; .00082 G/100ML; G/100ML; G/100ML; G/100ML; G/100ML; G/100ML; G/100ML
100 INJECTION, SOLUTION INTRAVENOUS CONTINUOUS
Status: DISCONTINUED | OUTPATIENT
Start: 2025-05-07 | End: 2025-05-08

## 2025-05-07 RX ADMIN — Medication 10 ML: at 11:42

## 2025-05-07 RX ADMIN — PIPERACILLIN AND TAZOBACTAM 4.5 G: 36; 4.5 INJECTION, POWDER, LYOPHILIZED, FOR SOLUTION INTRAVENOUS at 17:54

## 2025-05-07 RX ADMIN — SODIUM CHLORIDE, SODIUM GLUCONATE, SODIUM ACETATE, POTASSIUM CHLORIDE, MAGNESIUM CHLORIDE, SODIUM PHOSPHATE, DIBASIC, AND POTASSIUM PHOSPHATE 100 ML/HR: .53; .5; .37; .037; .03; .012; .00082 INJECTION, SOLUTION INTRAVENOUS at 13:52

## 2025-05-07 RX ADMIN — CALCIUM ACETATE 1334 MG: 667 CAPSULE ORAL at 00:39

## 2025-05-07 RX ADMIN — HEPARIN SODIUM 5000 UNITS: 5000 INJECTION INTRAVENOUS; SUBCUTANEOUS at 08:04

## 2025-05-07 RX ADMIN — ARIPIPRAZOLE 20 MG: 10 TABLET ORAL at 21:25

## 2025-05-07 RX ADMIN — SODIUM BICARBONATE 650 MG: 650 TABLET ORAL at 21:25

## 2025-05-07 RX ADMIN — IOHEXOL 15 ML: 350 INJECTION, SOLUTION INTRAVENOUS at 12:02

## 2025-05-07 RX ADMIN — CALCIUM ACETATE 1334 MG: 667 CAPSULE ORAL at 17:24

## 2025-05-07 RX ADMIN — MAGNESIUM SULFATE HEPTAHYDRATE 2 G: 40 INJECTION, SOLUTION INTRAVENOUS at 13:52

## 2025-05-07 RX ADMIN — Medication 1 TABLET: at 08:21

## 2025-05-07 RX ADMIN — Medication 4000 UNITS: at 08:23

## 2025-05-07 RX ADMIN — FOLIC ACID 1 MG: 1 TABLET ORAL at 08:22

## 2025-05-07 RX ADMIN — CALCIUM ACETATE 1334 MG: 667 CAPSULE ORAL at 08:22

## 2025-05-07 RX ADMIN — B-COMPLEX W/ C & FOLIC ACID TAB 1 TABLET: TAB at 08:22

## 2025-05-07 RX ADMIN — ARIPIPRAZOLE 20 MG: 10 TABLET ORAL at 00:37

## 2025-05-07 RX ADMIN — SODIUM BICARBONATE 650 MG: 650 TABLET ORAL at 00:38

## 2025-05-07 RX ADMIN — PIPERACILLIN AND TAZOBACTAM 4.5 G: 36; 4.5 INJECTION, POWDER, LYOPHILIZED, FOR SOLUTION INTRAVENOUS at 12:22

## 2025-05-07 RX ADMIN — HEPARIN SODIUM 5000 UNITS: 5000 INJECTION INTRAVENOUS; SUBCUTANEOUS at 00:21

## 2025-05-07 RX ADMIN — SODIUM BICARBONATE 650 MG: 650 TABLET ORAL at 15:10

## 2025-05-07 RX ADMIN — SODIUM BICARBONATE 650 MG: 650 TABLET ORAL at 08:22

## 2025-05-07 RX ADMIN — ACETAMINOPHEN 650 MG: 325 TABLET, FILM COATED ORAL at 12:33

## 2025-05-07 RX ADMIN — ACETAMINOPHEN 650 MG: 325 TABLET, FILM COATED ORAL at 00:21

## 2025-05-07 RX ADMIN — HEPARIN SODIUM 5000 UNITS: 5000 INJECTION INTRAVENOUS; SUBCUTANEOUS at 21:25

## 2025-05-07 RX ADMIN — VENLAFAXINE HYDROCHLORIDE 225 MG: 150 CAPSULE, EXTENDED RELEASE ORAL at 08:23

## 2025-05-07 RX ADMIN — HEPARIN SODIUM 5000 UNITS: 5000 INJECTION INTRAVENOUS; SUBCUTANEOUS at 14:03

## 2025-05-07 NOTE — ED NOTES
Late IV abx due to difficult vasculature retrieving blood cultures.     Jessica Luu RN  05/06/25 1509

## 2025-05-07 NOTE — ED NOTES
Patient says she prefers to not change into hospital gown at this time.     Jessica Luu RN  05/06/25 6016

## 2025-05-07 NOTE — PLAN OF CARE
Problem: Potential for Falls  Goal: Patient will remain free of falls  Description: INTERVENTIONS:- Educate patient/family on patient safety including physical limitations- Instruct patient to call for assistance with activity - Consult OT/PT to assist with strengthening/mobility - Keep Call bell within reach- Keep bed low and locked with side rails adjusted as appropriate- Keep care items and personal belongings within reach- Initiate and maintain comfort rounds- Make Fall Risk Sign visible to staff-   INTERVENTIONS:- Educate patient/family on patient safety including physical limitations- Instruct patient to call for assistance with activity - Consult OT/PT to assist with strengthening/mobility - Keep Call bell within reach- Keep bed low and locked with side rails adjusted as appropriate- Keep care items and personal belongings within reach- Initiate and maintain comfort rounds- Make Fall Risk Sign visible to staff-   Outcome: Progressing     Problem: Prexisting or High Potential for Compromised Skin Integrity  Goal: Skin integrity is maintained or improved  Description: INTERVENTIONS:- Identify patients at risk for skin breakdown- Assess and monitor skin integrity- Assess and monitor nutrition and hydration status- Monitor labs - Assess for incontinence - Turn and reposition patient- Assist with mobility/ambulation- Relieve pressure over bony prominences- Avoid friction and shearing- Provide appropriate hygiene as needed including keeping skin clean and dry- Evaluate need for skin moisturizer/barrier cream- Collaborate with interdisciplinary team - Patient/family teaching- Consider wound care consult   Outcome: Progressing     Problem: PAIN - ADULT  Goal: Verbalizes/displays adequate comfort level or baseline comfort level  Description: Interventions:- Encourage patient to monitor pain and request assistance- Assess pain using appropriate pain scale- Administer analgesics based on type and severity of pain and  evaluate response- Implement non-pharmacological measures as appropriate and evaluate response- Consider cultural and social influences on pain and pain management- Notify physician/advanced practitioner if interventions unsuccessful or patient reports new pain  Outcome: Progressing     Problem: INFECTION - ADULT  Goal: Absence or prevention of progression during hospitalization  Description: INTERVENTIONS:- Assess and monitor for signs and symptoms of infection- Monitor lab/diagnostic results- Monitor all insertion sites, i.e. indwelling lines, tubes, and drains- Monitor endotracheal if appropriate and nasal secretions for changes in amount and color- [place] appropriate cooling/warming therapies per order- Administer medications as ordered- Instruct and encourage patient and family to use good hand hygiene technique- Identify and instruct in appropriate isolation precautions for identified infection/condition  Outcome: Progressing  Goal: Absence of fever/infection during neutropenic period  Description: INTERVENTIONS:- Monitor WBC  Outcome: Progressing     Problem: SAFETY ADULT  Goal: Patient will remain free of falls  Description: INTERVENTIONS:- Educate patient/family on patient safety including physical limitations- Instruct patient to call for assistance with activity - Consult OT/PT to assist with strengthening/mobility - Keep Call bell within reach- Keep bed low and locked with side rails adjusted as appropriate- Keep care items and personal belongings within reach- Initiate and maintain comfort rounds- Make Fall Risk Sign visible to staff-  INTERVENTIONS:- Educate patient/family on patient safety including physical limitations- Instruct patient to call for assistance with activity - Consult OT/PT to assist with strengthening/mobility - Keep Call bell within reach- Keep bed low and locked with side rails adjusted as appropriate- Keep care items and personal belongings within reach- Initiate and maintain  comfort rounds- Make Fall Risk Sign visible to staff-   Outcome: Progressing  Goal: Maintain or return to baseline ADL function  Description: INTERVENTIONS:-  Assess patient's ability to carry out ADLs; assess patient's baseline for ADL function and identify physical deficits which impact ability to perform ADLs (bathing, care of mouth/teeth, toileting, grooming, dressing, etc.)- Assess/evaluate cause of self-care deficits - Assess range of motion- Assess patient's mobility; develop plan if impaired- Assess patient's need for assistive devices and provide as appropriate- Encourage maximum independence but intervene and supervise when necessary- Involve family in performance of ADLs- Assess for home care needs following discharge - Consider OT consult to assist with ADL evaluation and planning for discharge- Provide patient education as appropriate  Outcome: Progressing     Problem: DISCHARGE PLANNING  Goal: Discharge to home or other facility with appropriate resources  Description: INTERVENTIONS:- Identify barriers to discharge w/patient and caregiver- Arrange for needed discharge resources and transportation as appropriate- Identify discharge learning needs (meds, wound care, etc.)- Arrange for interpretive services to assist at discharge as needed- Refer to Case Management Department for coordinating discharge planning if the patient needs post-hospital services based on physician/advanced practitioner order or complex needs related to functional status, cognitive ability, or social support system  Outcome: Progressing

## 2025-05-07 NOTE — ASSESSMENT & PLAN NOTE
Mild hyponatremia at 134 on admission    Plan:  Encourage oral intake and hydration  Monitor daily BMP

## 2025-05-07 NOTE — UTILIZATION REVIEW
Initial Clinical Review    Observation on 05/06 @ 1938 upgraded to Inpatient on 05/07 @ 1440. Pt requiring continued stay d/t suspect bacteremia secondary to UTI/pyelonephritis on IV abx.    Admission: Date/Time/Statement:   Admission Orders (From admission, onward)       Ordered        05/07/25 1440  INPATIENT ADMISSION  Once            05/06/25 1938  Place in Observation  Once                          Orders Placed This Encounter   Procedures    INPATIENT ADMISSION     Standing Status:   Standing     Number of Occurrences:   1     Level of Care:   Med Surg [16]     Estimated length of stay:   More than 2 Midnights     Certification:   I certify that inpatient services are medically necessary for this patient for a duration of greater than two midnights. See H&P and MD Progress Notes for additional information about the patient's course of treatment.     ED Arrival Information       Expected   -    Arrival   5/6/2025 15:59    Acuity   Urgent              Means of arrival   Walk-In    Escorted by   Self    Service   Hospitalist    Admission type   Emergency              Arrival complaint   Back Pain             Chief Complaint   Patient presents with    Back Pain     Per pt, back pressure at site of internal nephrostomy tube. Concern tube needs to be replaced       Initial Presentation: 69 y.o. female with a PMH of endometrial cancer s/p resection, CKD w, R sided nephrostomy tube, L sided tunneled nephrostomy tube ileal conduit, MDD, HLD, and HTN presented to the ED from home w/ sudden onset of L sided flank pain that started earlier today described as cramping sensation w/c was localized and nonradiating. Tried Tylenol w/o pain relief. Reports  less urine drainage from her ileal conduit ostomy bag throughout the day.    In the ED, WBC 23.35 w/ bandemia, HR 98, T 97.4. Creatinine 2.75, baseline 2.2-2.5.   CT abdomen and pelvis: Mild left-sided hydronephrosis with moderate perinephric inflammatory fat stranding left  side. Possible pancolitis. Findings suspicious for possible UTI/pyelonephritis, or tunneled nephrostomy tube obstruction   Given 500 cc IVF bolus, IV Levaquin, po Tylenol.    Admit as observation level of care for SIRS, L flank pain, malfunction of nephrostomy tube, elevated creatinine.  Plan: Mon off abx. Mon fever curve. Monitor nephrostomy tube drainage. Check UA with reflex. Follow BC. In the case of possible nephrostomy tube obstruction, planning for tube exchange with IR to be performed 5/7. Tylenol PRN for pain.   Mon renal fxn.     Anticipated Length of Stay/Certification Statement:  Patient will be admitted on an inpatient basis with an anticipated length of stay of greater than 2 midnights secondary to possible UTI and nephrostomy tube malfunction.     Date: 05/07   Day 2:   IR Consult: Imaging demonstrated mild left hydronephrosis with inflammatory fat stranding. Findings most suspicious for left retrograde PCNU dysfunction with secondary pyelonephritis. Plan for exchange of R PCN.    IR Procedure Note:  Date: 5/7/2025   Anesthesia: local   Specimens: Purulent urine from retrograde left PCNU    Findings:   Retrograde left PCNU was completely occluded with stony debris and contrast injection could not be performed initially.  The catheter was transected and cloudy urine immediately began draining, consistent with occlusion at the hub.  Contrast injection via the transected catheter demonstrated an otherwise patent catheter in good position.   Uncomplicated L retrograde PCNU exchange for new, same-sized 10.2 Fr x 45 cm MPD.   R PCN was exchanged for a new, same-sized 10.2 Fr x 25 cm Hicks-Perry catheter, positioned in a lower pole calyx in the heavily scarred collecting system.   Plan:  - Next exchange in 6 weeks given encrustation and occlusion of the retrograde PCNU catheter at 8 weeks with resultant pyelonephritis and hospitalization.  - Depending on appearance at that and future exchanges,  lengthening the exchange interval can be considered.    IM Quick Notes:   Sepsis: Blood cultures positive for 2 out of 2 gram-negative rods both from right arm draw.  Suspect bacteremia secondary to UTI/pyelonephritis. S/p  successful exchange of bilateral nephrostomy tubes.  Per IR, left nephrostomy tube was completely blocked due to stones.  Suspect likely contributing to her elevated creatinine. start Zosyn bacteremia and continue to trend CBC and fever curve. Cont Tylenol prn for pain.  Hypomagnesia:  Magnesium decreased at 1.7.  Repleted.  Will trend magnesium in the a.m.  Elevated serum Cr:   Baseline creatinine 2.1-2.4  Creatinine on admission elevated at 2.75 and increased to 3.17 on a.m. labs  start Isolyte 100 mL an hour and continue to monitor response to fluids   Continue to trend creatinine and monitor response to fluids     Date: 05/08  Day 3: Has surpassed a 2nd midnight with active treatments and services.  Pt reports flank pain has significant improvement after IR procedure. UOP at  baseline. Reports no BM x 2 days, will start on bowel regimen. Cont IV Zosyn. Monitor nephrostomy tube drainage b/l. Continue to trend CBC and fever curve. PT/OT. Cr increased overnight to 3.59 from 3.17 despite being on isolyte 100 ml/hr for 24 hours. CO2 decreased to 15. Nephrology consulted. Mon daily BMP.     Nephrology Consult: MONROE:  Etiology: Obstructive uropathy +/- ATN from complicated urinary infection leading to bacteremia  Baseline creatinine 2.0-2.5, 2.75 on admission, 3.59 today.   UA: 1+ protein, 20-30 RBC, innumerable bacteria. S/p IR procedure yesterday. Blood cultures growing Klebsiella. PCN cultures: Polymicrobial growth. IV sodium bicarbonate at 100 cc/h started this am. Mon for postobstructive diuresis. Expecting for serum creatinine to improve in next 24 to 48 hours. No indication for renal replacement therapy. Trend BMP    ED Treatment-Medication Administration from 05/06/2025 1559 to 05/07/2025  0122         Date/Time Order Dose Route Action     05/06/2025 1952 sodium chloride 0.9 % bolus 500 mL 500 mL Intravenous New Bag     05/06/2025 2112 levofloxacin (LEVAQUIN) IVPB (premix in dextrose) 750 mg 150 mL 750 mg Intravenous New Bag     05/07/2025 0021 acetaminophen (TYLENOL) tablet 650 mg 650 mg Oral Given     05/07/2025 0037 ARIPiprazole (ABILIFY) tablet 20 mg 20 mg Oral Given     05/07/2025 0039 calcium acetate (PHOSLO) capsule 1,334 mg 1,334 mg Oral Given     05/07/2025 0038 sodium bicarbonate tablet 650 mg 650 mg Oral Given     05/07/2025 0021 heparin (porcine) subcutaneous injection 5,000 Units 5,000 Units Subcutaneous Given            Scheduled Medications:  ARIPiprazole, 20 mg, Oral, HS  calcium acetate, 1,334 mg, Oral, BID  calcium carbonate, 1 tablet, Oral, Daily With Breakfast  cholecalciferol, 4,000 Units, Oral, Daily  folic acid, 1 mg, Oral, Daily  heparin (porcine), 5,000 Units, Subcutaneous, Q8H CH[place]IAN[place]  [START ON 5/8/2025] levofloxacin, 500 mg, Intravenous, Q48H  magnesium sulfate, 2 g, Intravenous, Once  multivitamin stress formula, 1 tablet, Oral, QAM  piperacillin-tazobactam, 4.5 g, Intravenous, Q12H  sodium bicarbonate, 650 mg, Oral, TID  venlafaxine, 225 mg, Oral, Daily      Continuous IV Infusions:  multi-electrolyte, 100 mL/hr, Intravenous, Continuous      PRN Meds:  acetaminophen, 650 mg, Oral, Q6H PRN      ED Triage Vitals   Temperature Pulse Respirations Blood Pressure SpO2 Pain Score   05/06/25 1605 05/06/25 1605 05/06/25 1605 05/06/25 1605 05/06/25 1605 05/06/25 1906   (!) 97.4 °F (36.3 °C) 72 18 130/62 97 % 6     Weight (last 2 days)       Date/Time Weight    05/07/25 0130 71.9 (158.51)            Vital Signs (last 3 days)       Date/Time Temp Pulse Resp BP MAP (mmHg) SpO2 O2 Device Patient Position - Orthostatic VS [place] Coma Scale Score Pain    05/07/25 1233 -- -- -- -- -- -- -- -- -- 6    05/07/25 0700 99.2 °F (37.3 °C) 81 18 117/62 83 95 % None (Room air) Lying -- --    05/07/25  0336 -- -- -- -- -- -- -- -- 15 --    05/07/25 0130 99.2 °F (37.3 °C) 89 18 111/59 -- 95 % None (Room air) Lying -- --    05/07/25 0100 -- -- -- -- -- -- -- -- -- 3    05/07/25 0059 -- 99 18 121/58 84 97 % -- Lying -- --    05/07/25 0045 -- 96 -- -- -- 97 % -- -- -- --    05/07/25 0021 -- -- -- -- -- -- -- -- -- 7    05/07/25 0000 -- 87 -- -- -- 96 % -- -- -- --    05/06/25 2341 -- -- -- -- -- 97 % -- -- 15 --    05/06/25 2258 -- -- -- -- -- -- -- -- 15 --    05/06/25 2230 -- 98 -- -- -- 96 % -- -- -- --    05/06/25 2130 -- 98 -- -- -- 96 % -- -- -- --    05/06/25 2100 -- 86 -- 123/57 82 98 % -- -- -- --    05/06/25 1928 -- 92 -- -- -- -- -- -- -- --    05/06/25 1906 -- 89 -- 136/65 -- 96 % None (Room air) Lying -- 6    05/06/25 1816 -- -- -- -- -- -- -- -- 15 --    05/06/25 1605 97.4 °F (36.3 °C) 72 18 130/62 -- 97 % None (Room air) Sitting -- --              Pertinent Labs/Diagnostic Test Results:   Radiology:  CT abdomen pelvis wo contrast   Final Interpretation by Dulce Duggan MD (05/06 5404)      Ileal conduit. Left ureteral stent extends from the left renal hilum through the conduit and stoma.      Mild left hydronephrosis without hydroureter. Moderate left perinephric inflammatory fat stranding without fluid collection. Findings may represent collecting system obstruction and/or urinary tract infection/pyelonephritis.      Possible pancolitis.      Workstation performed: [id number]         IR Retrograde/Ileal Conduit Nephrostomy tube check/change/reposition/upsize    (Results Pending)   IR Retrograde/Ileal Conduit Nephrostomy tube check/change/reposition/upsize    (Results Pending)     Cardiology:  No orders to display     GI:  No orders to display           Results from last 7 days   Lab Units 05/07/25  0819 05/06/25  2248 05/06/25  1813   WBC Thousand/uL 25.43*  --  23.35*   HEMOGLOBIN g/dL 10.9*  --  12.3   HEMATOCRIT % 33.7*  --  39.2   PLATELETS Thousands/uL 190 188 216   BANDS PCT % 7  --  13*       "   Results from last 7 days   Lab Units 05/07/25  0819 05/06/25  1813   SODIUM mmol/L 134* 134*   POTASSIUM mmol/L 4.2 4.2   CHLORIDE mmol/L 107 104   CO2 mmol/L 16* 17*   ANION GAP mmol/L 11 13   BUN mg/dL 56* 48*   CREATININE mg/dL 3.17* 2.75*   EGFR ml/min/1.73sq m 14 17   CALCIUM mg/dL 8.6 9.4   MAGNESIUM mg/dL 1.7*  --      Results from last 7 days   Lab Units 05/06/25  1813   AST U/L 18   ALT U/L 29   ALK PHOS U/L 110*   TOTAL PROTEIN g/dL 8.2   ALBUMIN g/dL 4.2   TOTAL BILIRUBIN mg/dL 0.41         Results from last 7 days   Lab Units 05/07/25  0819 05/06/25  1813   GLUCOSE RANDOM mg/dL 131 118             No results found for: \"BETA-HYDROXYBUTYRATE\"                           Results from last 7 days   Lab Units 05/06/25  2034   PROTIME seconds 13.7   INR  0.98   PTT seconds 29             Results from last 7 days   Lab Units 05/06/25  2034   LACTIC ACID mmol/L 1.2                                                 Results from last 7 days   Lab Units 05/06/25  2249   CLARITY UA  Turbid   COLOR UA  Light Orange   SPEC GRAV UA  1.010   PH UA  8.5*   GLUCOSE UA mg/dl Negative   KETONES UA mg/dl Negative   BLOOD UA  Small*   PROTEIN UA mg/dl 70 (1+)*   NITRITE UA  Negative   BILIRUBIN UA  Negative   UROBILINOGEN UA (BE) mg/dl <2.0   LEUKOCYTES UA  Large*   WBC UA /hpf 1-2   RBC UA /hpf 20-30*   BACTERIA UA /hpf Innumerable*   EPITHELIAL CELLS WET PREP /hpf None Seen                                 Results from last 7 days   Lab Units 05/06/25  2106 05/06/25 2034   GRAM STAIN RESULT  Gram negative rods* Gram negative rods*                   Past Medical History:   Diagnosis Date    Anemia     Anxiety     Cancer (HCC)     ENDOMETRIAL    Chemotherapy induced neutropenia (HCC) 08/30/2019    Chronic kidney disease     CKD (chronic kidney disease) stage 3, GFR 30-59 ml/min (HCC)     COVID     Fall 2022    Depression     DVT (deep venous thrombosis) (HCC) 07/19/2019    RIGHT LEG    Endometrial cancer (HCC) 12/2017    GERD " (gastroesophageal reflux disease)     H/O gastric bypass     Hard to intubate     pt denies AS OF 7/25/23    History of chemotherapy     started 12/2021endometrial cancer- done 12/23/22    History of DVT in adulthood     RLE    History of transfusion 04/13/2023    Hyperglycemia     vx type 2 dm -- last assessed 4/1/14; resolved 11/7/17    Hyperlipidemia     Hypertension     in past- no meds at present    Iron deficiency anemia     iron infusions weekly    Iron deficiency anemia secondary to inadequate dietary iron intake 02/08/2023    OAB (overactive bladder)     Port-A-Cath in place     Wears glasses      Present on Admission:   Hyponatremia   CKD (chronic kidney disease)      Admitting Diagnosis: Pyelonephritis [N12]  MONROE (acute kidney injury) (HCC) [N17.9]  Age/Sex: 69 y.o. female    Network Utilization Review Department  ATTENTION: Please call with any questions or concerns to [phone] and carefully listen to the prompts so that you are directed to the right person. All voicemails are confidential.   For Discharge needs, contact Care Management DC Support Team at [phone] opt. [phone]  Send all requests for admission clinical reviews, approved or denied determinations and any other requests to dedicated fax number below belonging to the campus where the patient is receiving treatment. List of dedicated fax numbers for the Facilities:  FACILITY NAME UR FAX NUMBER   ADMISSION DENIALS (Administrative/Medical Necessity) [phone]   DISCHARGE SUPPORT TEAM (NETWORK) [phone]   PARENT CHILD HEALTH (Maternity/NICU/Pediatrics) [phone]   [facility] [phone]   [facility] [phone]   [facility] [phone]   [facility] [phone]   On license of [facility] [phone]   [facility] [phone]   [facility] [phone]   [place]  [facility] [phone]   [facility] [phone]   [facility] [phone]   General acute hospital [phone]   [facility] [phone]

## 2025-05-07 NOTE — ASSESSMENT & PLAN NOTE
Noted low urine output from urostomy draining from left-sided ileal conduit  No changes noted with the right sided nephrostomy tube  Patient has history of endometrial cancer for which she received chemotherapy with her last dose being in 2022, she developed BL distal urethral strictures due to endometrial carcinoma and subsequently underwent nephrostomy tube placement bilaterally with the left side ileal conduit  Undergoes tube exchange every 3 months with IR outpatient, her last exchange was in March    Plan:  Inpatient consult to IR  Plan for exchange 5/7, n.p.o. at midnight

## 2025-05-07 NOTE — BRIEF OP NOTE (RAD/CATH)
INTERVENTIONAL RADIOLOGY PROCEDURE NOTE    Date: 5/7/2025    Procedure:   Procedure Summary       Date:  Room / Location:     Anesthesia Start:  Anesthesia Stop:     Procedure:  Diagnosis:     Scheduled Providers:  Responsible Provider:     Anesthesia Type: Not recorded ASA Status: Not recorded            Preoperative diagnosis:   1. Pyelonephritis    2. MONROE (acute kidney injury) (HCC)         Postoperative diagnosis: Same.    Surgeon: Ish Ames MD     Assistant: Angel Crowell DO, PGY-2    Blood loss: None    Specimens: Purulent urine from retrograde left PCNU     Findings:   Retrograde left PCNU was completely occluded with stony debris and contrast injection could not be performed initially.  The catheter was transected and cloudy urine immediately began draining, consistent with occlusion at the hub.  Contrast injection via the transected catheter demonstrated an otherwise patent catheter in good position.    Uncomplicated L retrograde PCNU exchange for new, same-sized 10.2 Fr x 45 cm MPD.    R PCN was exchanged for a new, same-sized 10.2 Fr x 25 cm Hicks-Perry catheter, positioned in a lower pole calyx in the heavily scarred collecting system.    Complications: None immediate.    Anesthesia: local    Plan:  - Next exchange in 6 weeks given encrustation and occlusion of the retrograde PCNU catheter at 8 weeks with resultant pyelonephritis and hospitalization.  - Depending on appearance at that and future exchanges, lengthening the exchange interval can be considered.

## 2025-05-07 NOTE — ASSESSMENT & PLAN NOTE
Elevated serum creatinine in the setting of CKD  Creatinine on admission 2.75  Baseline appears to be 2.2-2.5  Most likely in the setting of possible nephrostomy tube obstruction    Plan:  Monitor daily BMP  Plan for nephrostomy tube exchange 5/7

## 2025-05-07 NOTE — ASSESSMENT & PLAN NOTE
Presenting with left-sided flank pain which began abruptly earlier in the day  No change in recent physical activity, no recent trauma, no association with diet  Given CT findings listed above suspect possible pyelonephritis, or left-sided nephrostomy tube malfunction  Pain currently controlled     Plan:  See plan above  Tylenol PRN for pain

## 2025-05-07 NOTE — CONSULTS
e-Consult (IPC)  - Interventional Radiology  Ragini Melendez 69 y.o. 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    She presented with left flank pain, decreased output per urostomy, and associated leukocytosis.  Imaging demonstrated mild left hydronephrosis with inflammatory fat stranding.    Last catheter exchange was 3/6/25.    Inpatient Consult to IR  Consult performed by: Ish Ames MD  Consult ordered by: Marisa Chaudhary PA-C        05/07/25    Assessment/Recommendation:   Findings most suspicious for left retrograde PCNU dysfunction with secondary pyelonephritis.  Fortunately she is currently afebrile, hemodynamically stable, without overt sepsis.    Will plan for exchange on an expedited basis.    Given time interval since last exchange, we will attempt to accommodate exchange of the R PCN at the same time to align exchange intervals.    11-20 minutes, >50% of the total time devoted to medical consultative verbal/EMR discussion between providers. Written report will be generated in the EMR.     Thank you for allowing Interventional Radiology to participate in the care of Ragini Melendez. Please don't hesitate to call or TigerText us with any questions.     Ish Ames MD

## 2025-05-07 NOTE — ASSESSMENT & PLAN NOTE
Lab Results   Component Value Date    EGFR 12 05/08/2025    EGFR 14 05/07/2025    EGFR 17 05/06/2025    CREATININE 3.59 (H) 05/08/2025    CREATININE 3.17 (H) 05/07/2025    CREATININE 2.75 (H) 05/06/2025   MONROE on CKD  Creatinine continuing to increase  from 3.17-3.59  Nephrology on board.  Appreciate recommendations

## 2025-05-07 NOTE — SEDATION DOCUMENTATION
Left retrograde ileal conduit nephrostomy tube exchanged. Fluid removed and sent for cultures. Tube to bag drainage. Right nephrostomy tube also exchanged. Tube to bag drainage. Dry dressing to site. Patient tolerated well.

## 2025-05-07 NOTE — ASSESSMENT & PLAN NOTE
Presenting with left-sided flank pain which started earlier in the day with associated low urostomy output from left-sided tunneled nephrostomy tube  WBC elevated 23 with bandemia, heart rate 98, borderline temperature 36.3 °C  LA WNL 1.2  Blood pressures stable  CT abdomen and pelvis: Mild left-sided hydronephrosis with moderate perinephric inflammatory fat stranding left side.  Possible pancolitis  Patient denies any fever or chills.  She denies any abdominal pain, nausea, vomiting, or diarrhea.  Findings suspicious for possible UTI/pyelonephritis, or tunneled nephrostomy tube obstruction  Prior urine cultures growing Klebsiella, Pseudomonas, Enterococcus, and Neisseria  Hx of MDRO with urine culture in 2020 growing carbopenemase producing Klebsiella pneumoniae susceptible to Levaquin  Due to prior urine culture started on Levaquin in the ED    Plan:  Monitor off antibiotics s/p 1 dose of Levaquin in the ED next dose scheduled 5/7  Monitor fever curve  Monitor nephrostomy tube drainage  Check UA with reflex  Follow BC  In the case of possible nephrostomy tube obstruction, planning for tube exchange with IR to be performed 5/7

## 2025-05-07 NOTE — QUICK NOTE
"69-year-old female past medical history endometrial cancer s/p resection with last dose of chemotherapy 2022, CKD with right-sided nephrostomy tube, left sided tunneled nephrostomy tube ileal conduit, and hypertension presented with sudden onset left flank pain that began 5/6/2025.  Patient reported that she had decreased urine output in her nephrostomy tubes per her usual.  States that urine in right kidney was significantly diluted; does note that right kidney is \"on its way out\" so does not typically produce a significant amount of urine.  Noted that urine from left kidney was dark in color.  Patient states that nephrostomy tube typically changed every 3 months.  Last changed March 2025.    Sepsis:   Patient met SIRS criteria on admission due to white blood cell count of 23K and heart rate of 98 bpm.  Tmax 99.4.  Lactic acid within normal limits.  Blood pressure stable.  Blood cultures positive for 2 out of 2 gram-negative rods both from right arm draw.  Suspect bacteremia secondary to UTI/pyelonephritis   Patient has successful exchange of bilateral nephrostomy tubes.  Per IR, left nephrostomy tube was completely blocked due to stones.  Suspect likely contributing to her elevated creatinine.  Per sister, patient has a known history of kidney stones  Patient has documented allergy of rash response to cephalosporin.  Discussed with patient who states that she previously was on an IV cephalosporin and broke out into hives.  Will start her on Zosyn for bacteremia and continue to trend CBC and fever curve.  Will address antibiotics as clinically indicated    Hypomagnesia:  Magnesium decreased at 1.7.  Repleted.  Will trend magnesium in the a.m.    L flank pain:  At time of exam, patient reports left flank pain has since resolved.  Did have mild pain on Llyod's test  Continue Tylenol as needed for pain    Elevated serum Cr:   Baseline creatinine 2.1-2.4  Creatinine on admission elevated at 2.75 and increased to 3.17 " on a.m. labs  Does not technically meet criteria for MONROE as creatinine elevation not 1.5x greater then baseline  Will start Isolyte 100 mL an hour and continue to monitor response to fluids  Last echocardiogram 2022 EF 60% with grade 1 diastolic dysfunction.  Patient appears mildly hypovolemic on exam  Continue to trend creatinine and monitor response to fluids    Updated sister Brunilda over the phone.       Physical Exam  Vitals and nursing note reviewed.   Constitutional:       General: She is not in acute distress.     Appearance: She is well-developed.   HENT:      Head: Normocephalic and atraumatic.   Eyes:      Conjunctiva/sclera: Conjunctivae normal.   Cardiovascular:      Rate and Rhythm: Regular rhythm. Tachycardia present.      Heart sounds: No murmur heard.  Pulmonary:      Effort: Pulmonary effort is normal. No respiratory distress.      Breath sounds: Normal breath sounds.   Abdominal:      Palpations: Abdomen is soft.      Tenderness: There is no abdominal tenderness. There is left CVA tenderness.      Comments: Bilateral nephrostomy bags in place containing small amounts of urine   Musculoskeletal:         General: No swelling.      Cervical back: Neck supple.      Right lower leg: No edema.      Left lower leg: No edema.   Skin:     General: Skin is warm and dry.      Capillary Refill: Capillary refill takes less than 2 seconds.      Findings: Lesion present.      Comments: Well-healing lesion without evidence of purulence/erythema along site of nephrostomy tube b/l   Neurological:      Mental Status: She is alert.   Psychiatric:         Mood and Affect: Mood normal.

## 2025-05-07 NOTE — H&P
H&P - Hospitalist   Name: Ragini Melendez 69 y.o. female I MRN: [id number]  Unit/Bed#: ED-32 I Date of Admission: 5/6/2025   Date of Service: 5/7/2025 I Hospital Day: 0     Assessment & Plan  SIRS (systemic inflammatory response syndrome) (HCC)  Presenting with left-sided flank pain which started earlier in the day with associated low urostomy output from left-sided tunneled nephrostomy tube  WBC elevated 23 with bandemia, heart rate 98, borderline temperature 36.3 °C  LA WNL 1.2  Blood pressures stable  CT abdomen and pelvis: Mild left-sided hydronephrosis with moderate perinephric inflammatory fat stranding left side.  Possible pancolitis  Patient denies any fever or chills.  She denies any abdominal pain, nausea, vomiting, or diarrhea.  Findings suspicious for possible UTI/pyelonephritis, or tunneled nephrostomy tube obstruction  Prior urine cultures growing Klebsiella, Pseudomonas, Enterococcus, and Neisseria  Hx of MDRO with urine culture in 2020 growing carbopenemase producing Klebsiella pneumoniae susceptible to Levaquin  Due to prior urine culture started on Levaquin in the ED    Plan:  Monitor off antibiotics s/p 1 dose of Levaquin in the ED next dose scheduled 5/7  Monitor fever curve  Monitor nephrostomy tube drainage  Check UA with reflex  Follow BC  In the case of possible nephrostomy tube obstruction, planning for tube exchange with IR to be performed 5/7  Left flank pain  Presenting with left-sided flank pain which began abruptly earlier in the day  No change in recent physical activity, no recent trauma, no association with diet  Given CT findings listed above suspect possible pyelonephritis, or left-sided nephrostomy tube malfunction  Pain currently controlled     Plan:  See plan above  Tylenol PRN for pain  Malfunction of nephrostomy tube (HCC)  Noted low urine output from urostomy draining from left-sided ileal conduit  No changes noted with the right sided nephrostomy tube  Patient has history of  endometrial cancer for which she received chemotherapy with her last dose being in 2022, she developed BL distal urethral strictures due to endometrial carcinoma and subsequently underwent nephrostomy tube placement bilaterally with the left side ileal conduit  Undergoes tube exchange every 3 months with IR outpatient, her last exchange was in March    Plan:  Inpatient consult to IR  Plan for exchange 5/7, n.p.o. at midnight  Elevated serum creatinine  Elevated serum creatinine in the setting of CKD  Creatinine on admission 2.75  Baseline appears to be 2.2-2.5  Most likely in the setting of possible nephrostomy tube obstruction    Plan:  Monitor daily BMP  Plan for nephrostomy tube exchange 5/7  Hyponatremia  Mild hyponatremia at 134 on admission    Plan:  Encourage oral intake and hydration  Monitor daily BMP    VTE Pharmacologic Prophylaxis: VTE Score: 9 High Risk (Score >/= 5) - Pharmacological DVT Prophylaxis Ordered: heparin. Sequential Compression Devices Ordered.  Code Status: Level 1 - Full Code discussed with patient  Discussion with family: Patient declined call to .     Anticipated Length of Stay: Patient will be admitted on an inpatient basis with an anticipated length of stay of greater than 2 midnights secondary to possible UTI and nephrostomy tube malfunction.    History of Present Illness   Chief Complaint: Left flank pain    Ragini Melendez is a 69 y.o. female with a PMH of endometrial cancer s/p resection with last dose of chemotherapy in 2022, CKD with right sided nephrostomy tube, left-sided tunneled nephrostomy tube ileal conduit, MDD, HLD, and HTN who presents with left-sided flank pain which began abruptly earlier in the day.  Patient states she was going about her day as usual at home, when she suddenly developed left-sided flank pain which she described as a cramping sensation which was localized and nonradiating.  She had tried Tylenol for pain relief which was not  successful.  She had also noted that there was less urine drainage from her ileal conduit ostomy bag throughout the day.  She denied noticing any changes in urine color.  She reported to the ER for further evaluation.  She denied any fever or chills.  She denied any abdominal pain, nausea, vomiting, or diarrhea.  She does have history of prior UTIs with urine culture in 2020 growing carbapenemase producing Klebsiella pneumoniae and was treated with Levaquin given susceptibilities at that time.  Of note she had noted she was experiencing abdominal cramping last week for which she had seen CT scan outpatient which was not formally read.  She indicates abdominal cramping has since resolved.    Review of Systems   Constitutional:  Negative for chills and fever.   HENT:  Negative for congestion, rhinorrhea and sinus pressure.    Respiratory:  Negative for chest tightness and shortness of breath.    Cardiovascular:  Negative for chest pain and palpitations.   Gastrointestinal:  Negative for abdominal pain, diarrhea, nausea and vomiting.   Genitourinary:  Positive for flank pain.   Musculoskeletal:  Negative for arthralgias and myalgias.   Neurological:  Negative for dizziness, weakness and light-headedness.       Historical Information   Past Medical History:   Diagnosis Date   • Anemia    • Anxiety    • Cancer (HCC)     ENDOMETRIAL   • Chemotherapy induced neutropenia (HCC) 08/30/2019   • Chronic kidney disease    • CKD (chronic kidney disease) stage 3, GFR 30-59 ml/min (HCC)    • COVID     Fall 2022   • Depression    • DVT (deep venous thrombosis) (HCC) 07/19/2019    RIGHT LEG   • Endometrial cancer (HCC) 12/2017   • GERD (gastroesophageal reflux disease)    • H/O gastric bypass    • Hard to intubate     pt denies AS OF 7/25/23   • History of chemotherapy     started 12/2021endometrial cancer- done 12/23/22   • History of DVT in adulthood     RLE   • History of transfusion 04/13/2023   • Hyperglycemia     vx type 2 dm  -- last assessed 4/1/14; resolved 11/7/17   • Hyperlipidemia    • Hypertension     in past- no meds at present   • Iron deficiency anemia     iron infusions weekly   • Iron deficiency anemia secondary to inadequate dietary iron intake 02/08/2023   • OAB (overactive bladder)    • Port-A-Cath in place    • Wears glasses      Past Surgical History:   Procedure Laterality Date   • ABDOMINAL SURGERY      GASTRIC BYPASS; 2001   • BREAST BIOPSY Right 06/28/2019    core biopsy; benign   • CHOLECYSTECTOMY      at the time of gastric bypass   • COLONOSCOPY     • CT NEEDLE BIOPSY LYMPH NODE  07/08/2019   • CYSTECTOMY, RADICAL WITH ILEOCONDUIT N/A 10/24/2024    Procedure: ILEAL CONDUIT urinary diversion (No cystectomy), BILATERAL UTERO-LYSIS;  Surgeon: Demetrius Lowe MD;  [facility];  Service: Urology   • FL GUIDED CENTRAL VENOUS ACCESS DEVICE INSERTION  11/12/2019   • FL RETROGRADE PYELOGRAM  03/30/2023   • FL RETROGRADE PYELOGRAM  05/09/2023   • FL RETROGRADE PYELOGRAM  8/1/2023   • GASTRIC BYPASS     • HYSTERECTOMY Bilateral 2017    total abdominal with salpingo-oophorectomy   • IR NEPHROSTOMY TO NEPHROURETERAL STENT  06/09/2022   • IR NEPHROSTOMY TO NEPHROURETERAL STENT  12/20/2022   • IR NEPHROSTOMY TO NEPHROURETERAL STENT  8/8/2023   • IR NEPHROSTOMY TO NEPHROURETERAL STENT  12/4/2024   • IR NEPHROSTOMY TO NEPHROURETERAL STENT  3/6/2025   • IR NEPHROSTOMY TUBE CHECK/CHANGE/REPOSITION/REINSERTION/UPSIZE  05/27/2022   • IR NEPHROSTOMY TUBE CHECK/CHANGE/REPOSITION/REINSERTION/UPSIZE  11/10/2023   • IR NEPHROSTOMY TUBE CHECK/CHANGE/REPOSITION/REINSERTION/UPSIZE  2/9/2024   • IR NEPHROSTOMY TUBE CHECK/CHANGE/REPOSITION/REINSERTION/UPSIZE  4/12/2024   • IR NEPHROSTOMY TUBE CHECK/CHANGE/REPOSITION/REINSERTION/UPSIZE  7/12/2024   • IR NEPHROSTOMY TUBE CHECK/CHANGE/REPOSITION/REINSERTION/UPSIZE  10/16/2024   • IR NEPHROSTOMY TUBE CHECK/CHANGE/REPOSITION/REINSERTION/UPSIZE  11/6/2024   • IR NEPHROSTOMY TUBE  PLACEMENT  07/26/2019   • IR NEPHROSTOMY TUBE PLACEMENT  05/27/2023   • IR NEPHROURETERAL STENT CHECK/CHANGE/REPOSITION  04/06/2021   • IR NEPHROURETERAL STENT CHECK/CHANGE/REPOSITION  06/04/2021   • IR NEPHROURETERAL STENT CHECK/CHANGE/REPOSITION  09/03/2021   • IR NEPHROURETERAL STENT CHECK/CHANGE/REPOSITION  12/07/2021   • IR NEPHROURETERAL STENT CHECK/CHANGE/REPOSITION  03/08/2022   • IR NEPHROURETERAL STENT CHECK/CHANGE/REPOSITION  05/10/2022   • IR NEPHROURETERAL STENT CHECK/CHANGE/REPOSITION  09/19/2022   • IR PICC LINE  09/27/2019   • IR PORT PLACEMENT  07/26/2019   • IR PORT PLACEMENT     • IR PORT REMOVAL  09/20/2019   • OOPHORECTOMY Bilateral 2017   • AZ CYSTO W/INSERT URETERAL STENT Right 03/30/2023    Procedure: EXCHANGE STENT URETERAL;  Surgeon: Demetrius Lowe MD;  [facility];  Service: Urology   • AZ CYSTOURETHROSCOPY W/URETERAL CATHETERIZATION Right 03/30/2023    Procedure: CYSTOSCOPY RETROGRADE PYELOGRAM WITH exchange of STENT URETERAL;  Surgeon: Demetrius Lowe MD;  [facility];  Service: Urology   • AZ CYSTOURETHROSCOPY W/URETERAL CATHETERIZATION Bilateral 05/09/2023    Procedure: CYSTOSCOPY, BILATERAL RETROGRADE PYELOGRAM, WITH LEFT INSERTION STENT URETERAL, RIGHT URTERAL STENT EXCHANGE;  Surgeon: Nicola Hannah MD;  [facility];  Service: Urology   • AZ CYSTOURETHROSCOPY W/URETERAL CATHETERIZATION Bilateral 8/1/2023    Procedure: CYSTOSCOPY BILATERAL RETROGRADE  WITH REMOVAL BILATERAL  STENT URETERAL;  Surgeon: Demetrius Lowe MD;  [facility];  Service: Urology   • AZ INSJ TUNNELED CTR VAD W/SUBQ PORT AGE 5 YR/> Left 11/12/2019    Procedure: INSERTION VENOUS PORT ( PORT-A-CATH) IR;  Surgeon: Edilberto Guzman DO;  [facility];  Service: Interventional Radiology   • AZ LAPS ABD PRTM&OMENTUM DX W/WO SPEC BR/WA SPX N/A 12/19/2017    Procedure: LAPAROSCOPY DIAGNOSTIC;  Surgeon: Evgeny So MD;  [facility];   Service: Gynecology Oncology   • OK LAPS W/RAD HYST W/BILAT LMPHADEC RMVL TUBE/OVARY N/A 12/19/2017    Procedure: HYSTERECTOMY LAPAROSCOPIC TOTAL (LTH) W/ ROBOTICS; BILATERAL SALPINGOOPHERECTOMY; LYMPH NODE DISSECTION; lysis of adhesions;  Surgeon: Evgeny So MD;  [facility];  Service: Gynecology Oncology   • REMOVAL URETERAL STENT Bilateral 8/1/2023    Procedure: REMOVAL STENT URETERAL;  Surgeon: Demetrius Lowe MD;  [facility];  Service: Urology   • TONSILLECTOMY     • US GUIDED BREAST BIOPSY RIGHT COMPLETE Right 06/28/2019     Social History     Tobacco Use   • Smoking status: Never   • Smokeless tobacco: Never   Vaping Use   • Vaping status: Never Used   Substance and Sexual Activity   • Alcohol use: Never   • Drug use: Never   • Sexual activity: Not Currently     E-Cigarette/Vaping   • E-Cigarette Use Never User      E-Cigarette/Vaping Substances   • Nicotine No    • THC No    • CBD No    • Flavoring No    • Other No    • Unknown No        Social History:  Marital Status: Single   Occupation: Retired  Patient Pre-hospital Living Situation: With other family member: Mother  Patient Pre-hospital Level of Mobility: walks  Patient Pre-hospital Diet Restrictions: None    Meds/Allergies   I have reviewed home medications with patient personally.  Prior to Admission medications    Medication Sig Start Date End Date Taking? Authorizing Provider   acetaminophen (TYLENOL) 325 mg tablet Take 2 tablets (650 mg total) by mouth every 6 (six) hours as needed for mild pain 10/30/24   RAFITA Greco   ARIPiprazole (ABILIFY) 20 MG tablet Take 20 mg by mouth every morning 2/10/22   Historical Provider, MD   BD PosiFlush 0.9 % SOLN  8/17/23   Historical Provider, MD   calcitriol (ROCALTROL) 0.25 mcg capsule Take 1 capsule (0.25 mcg total) by mouth daily  Patient not taking: Reported on 4/17/2025 2/25/25 5/26/25  Wade Villalobos MD   calcium acetate (PHOSLO) capsule Take 2 capsules (1,334 mg  total) by mouth 2 (two) times a day 2 capsules with lunch, 2 capsules with dinner 2/25/25 2/20/26  Wade Villalobos MD   Calcium Carbonate (CALCIUM 500 PO) Take 500 mg by mouth in the morning    Historical Provider, MD   Calcium-Magnesium-Vitamin D (CALCIUM 500 PO) Take 1 capsule by mouth daily at bedtime    Historical Provider, MD   cetirizine (ZyrTEC) 10 mg tablet Take 1 tablet (10 mg total) by mouth daily 11/13/24   Emely Yost DO   cholecalciferol (VITAMIN D3) 1,000 units tablet Take 4 tablets (4,000 Units total) by mouth daily 2/1/23   Emely Yost DO   docusate sodium (COLACE) 100 mg capsule Take 1 capsule (100 mg total) by mouth 2 (two) times a day 10/30/24 4/15/25  RAFITA Greco   enoxaparin (Lovenox) 80 mg/0.8 mL Inject 0.7 mL (70 mg total) under the skin every 24 hours  Patient not taking: Reported on 12/6/2024 10/30/24 11/29/24  RAFITA Greco   folic acid (FOLVITE) 1 mg tablet Take 1 tablet (1 mg total) by mouth daily 10/8/19   Gage Perkins DO   methocarbamol (ROBAXIN) 750 mg tablet Take 1 tablet (750 mg total) by mouth every 6 (six) hours for 10 days  Patient not taking: Reported on 4/15/2025 10/30/24 11/9/24  RAFITA Greco   Multiple Vitamin (MULTIVITAMIN) tablet Take 1 tablet by mouth every morning    Historical Provider, MD   omeprazole (PriLOSEC) 20 mg delayed release capsule Take 20 mg by mouth as needed (GERD)    Historical Provider, MD   polyethylene glycol (MIRALAX) 17 g packet Take 17 g by mouth daily for 7 days 10/31/24 4/15/25  RAFITA Greco   sodium bicarbonate 650 mg tablet TAKE 1 TABLET BY MOUTH THREE TIMES DAILY 9/18/24   Wade Villalobos MD   SODIUM CHLORIDE, EXTERNAL, 0.9 % SOLN 10 mL by Intracatheter route daily. Intracatheter flushing daily. May substitute prefilled syringe with normal saline 10 mL vials, 10 mL syringes, and 18 g blunt needles    Historical Provider, MD   sodium chloride, PF, 0.9 % 10 mL by Intracatheter route daily Intracatheter flushing  daily. May substitute prefilled syringe with normal saline 10 mL vials, 10 mL syringes, and 18 g blunt needles  Patient not taking: Reported on 3/13/2025 9/16/24 12/15/24  Eliezer Tony PA-C   sodium chloride, PF, 0.9 % 10 mL by Intracatheter route daily for 120 doses Intracatheter flushing daily. May substitute prefilled syringe with normal saline 10 mL vials, 10 mL syringes, and 18 g blunt needles 11/10/24 4/17/25  Loli Moody MD   venlafaxine (EFFEXOR-XR) 75 mg 24 hr capsule TAKE 3 CAPS DAILY 7/24/23   Historical Provider, MD   Cyanocobalamin (VITAMIN B12 PO) Take 1 capsule by mouth daily after lunch  5/6/25  Historical Provider, MD   fluticasone (FLONASE) 50 mcg/act nasal spray 1 spray into each nostril daily  Patient not taking: Reported on 4/15/2025 3/21/24 5/6/25  Emely Yost DO   furosemide (LASIX) 20 mg tablet TAKE 1 TABLET (20 MG TOTAL) BY MOUTH IF NEEDED (FOR INCREASED LEG SWELLING OR WEIGHT GAIN >2 LBS IN 1 DAY) 12/12/23 5/6/25  Emely Yost DO   naloxone (NARCAN) 4 mg/0.1 mL nasal spray Administer 1 spray into a nostril. If no response after 2-3 minutes, give another dose in the other nostril using a new spray.  Patient not taking: Reported on 12/6/2024 11/6/24 5/6/25  RAFITA Arteaga   oxyCODONE-acetaminophen (Percocet) 5-325 mg per tablet Take 1 tablet by mouth every 4 (four) hours as needed for moderate pain Max Daily Amount: 6 tablets  Patient not taking: Reported on 12/6/2024 11/6/24 5/6/25  RAFITA Arteaga     Allergies   Allergen Reactions   • Cephalosporins Rash     Which Cephalosporin reaction was to not specified; however, has tolerated Amoxicillin, Cefazolin, and Cefepime       Objective :  Temp:  [97.4 °F (36.3 °C)] 97.4 °F (36.3 °C)  HR:  [72-98] 98  BP: (123-136)/(57-65) 123/57  Resp:  [18] 18  SpO2:  [96 %-98 %] 96 %  O2 Device: None (Room air)    Physical Exam  Constitutional:       General: She is not in acute distress.     Appearance: She is ill-appearing.    HENT:      Mouth/Throat:      Mouth: Mucous membranes are moist.      Pharynx: Oropharynx is clear.   Eyes:      Extraocular Movements: Extraocular movements intact.      Pupils: Pupils are equal, round, and reactive to light.   Cardiovascular:      Rate and Rhythm: Normal rate and regular rhythm.      Pulses: Normal pulses.      Heart sounds: Normal heart sounds. No murmur heard.  Pulmonary:      Effort: Pulmonary effort is normal. No respiratory distress.      Breath sounds: Normal breath sounds. No wheezing, rhonchi or rales.   Abdominal:      General: Bowel sounds are normal.      Palpations: Abdomen is soft.      Tenderness: There is no abdominal tenderness. There is no guarding or rebound.      Comments: Urostomy present right lower quadrant, draining scant clear yellow urine   Musculoskeletal:      Right lower leg: No edema.      Left lower leg: No edema.      Comments: Nephrostomy tube in place right flank, draining clear yellow urine   Skin:     General: Skin is warm and dry.   Neurological:      General: No focal deficit present.      Mental Status: She is alert and oriented to person, place, and time.   Psychiatric:         Mood and Affect: Mood normal.         Behavior: Behavior normal.       Lines/Drains:      Central Line:  Goal for removal: N/A - Chronic PICC             Lab Results: I have reviewed the following results:  Results from last 7 days   Lab Units 05/06/25  2248 05/06/25  1813   WBC Thousand/uL  --  23.35*   HEMOGLOBIN g/dL  --  12.3   HEMATOCRIT %  --  39.2   PLATELETS Thousands/uL 188 216   BANDS PCT %  --  13*   LYMPHO PCT %  --  1*   MONO PCT %  --  6   EOS PCT %  --  0     Results from last 7 days   Lab Units 05/06/25  1813   SODIUM mmol/L 134*   POTASSIUM mmol/L 4.2   CHLORIDE mmol/L 104   CO2 mmol/L 17*   BUN mg/dL 48*   CREATININE mg/dL 2.75*   ANION GAP mmol/L 13   CALCIUM mg/dL 9.4   ALBUMIN g/dL 4.2   TOTAL BILIRUBIN mg/dL 0.41   ALK PHOS U/L 110*   ALT U/L 29   AST U/L 18    GLUCOSE RANDOM mg/dL 118     Results from last 7 days   Lab Units 05/06/25 2034   INR  0.98         Lab Results   Component Value Date    HGBA1C 5.2 10/31/2022    HGBA1C 4.8 07/07/2021    HGBA1C 5.1 02/03/2020     Results from last 7 days   Lab Units 05/06/25 2034   LACTIC ACID mmol/L 1.2             Administrative Statements       ** Please Note: This note has been constructed using a voice recognition system. **

## 2025-05-08 PROBLEM — N17.9 ACUTE KIDNEY INJURY (HCC): Status: ACTIVE | Noted: 2025-05-06

## 2025-05-08 PROBLEM — E87.8 ELECTROLYTE ABNORMALITY: Status: ACTIVE | Noted: 2019-09-19

## 2025-05-08 PROBLEM — R10.9 LEFT FLANK PAIN: Status: RESOLVED | Noted: 2025-05-06 | Resolved: 2025-05-08

## 2025-05-08 PROBLEM — A41.9 SEPSIS (HCC): Status: ACTIVE | Noted: 2025-05-06

## 2025-05-08 PROBLEM — T83.098A MALFUNCTION OF NEPHROSTOMY TUBE (HCC): Status: RESOLVED | Noted: 2025-05-06 | Resolved: 2025-05-08

## 2025-05-08 LAB
ANION GAP SERPL CALCULATED.3IONS-SCNC: 14 MMOL/L (ref 4–13)
BUN SERPL-MCNC: 67 MG/DL (ref 5–25)
CALCIUM SERPL-MCNC: 8.2 MG/DL (ref 8.4–10.2)
CHLORIDE SERPL-SCNC: 105 MMOL/L (ref 96–108)
CO2 SERPL-SCNC: 15 MMOL/L (ref 21–32)
CREAT SERPL-MCNC: 3.59 MG/DL (ref 0.6–1.3)
ERYTHROCYTE [DISTWIDTH] IN BLOOD BY AUTOMATED COUNT: 13.2 % (ref 11.6–15.1)
GFR SERPL CREATININE-BSD FRML MDRD: 12 ML/MIN/1.73SQ M
GLUCOSE SERPL-MCNC: 84 MG/DL (ref 65–140)
HCT VFR BLD AUTO: 30.5 % (ref 34.8–46.1)
HGB BLD-MCNC: 9.8 G/DL (ref 11.5–15.4)
MAGNESIUM SERPL-MCNC: 2.4 MG/DL (ref 1.9–2.7)
MCH RBC QN AUTO: 29 PG (ref 26.8–34.3)
MCHC RBC AUTO-ENTMCNC: 32.1 G/DL (ref 31.4–37.4)
MCV RBC AUTO: 90 FL (ref 82–98)
PLATELET # BLD AUTO: 183 THOUSANDS/UL (ref 149–390)
PMV BLD AUTO: 9.8 FL (ref 8.9–12.7)
POTASSIUM SERPL-SCNC: 3.9 MMOL/L (ref 3.5–5.3)
RBC # BLD AUTO: 3.38 MILLION/UL (ref 3.81–5.12)
SODIUM SERPL-SCNC: 134 MMOL/L (ref 135–147)
WBC # BLD AUTO: 16.39 THOUSAND/UL (ref 4.31–10.16)

## 2025-05-08 PROCEDURE — 80048 BASIC METABOLIC PNL TOTAL CA: CPT

## 2025-05-08 PROCEDURE — 83735 ASSAY OF MAGNESIUM: CPT

## 2025-05-08 PROCEDURE — 97116 GAIT TRAINING THERAPY: CPT

## 2025-05-08 PROCEDURE — 99223 1ST HOSP IP/OBS HIGH 75: CPT | Performed by: INTERNAL MEDICINE

## 2025-05-08 PROCEDURE — 97163 PT EVAL HIGH COMPLEX 45 MIN: CPT

## 2025-05-08 PROCEDURE — 85027 COMPLETE CBC AUTOMATED: CPT

## 2025-05-08 PROCEDURE — 99232 SBSQ HOSP IP/OBS MODERATE 35: CPT | Performed by: INTERNAL MEDICINE

## 2025-05-08 PROCEDURE — 97167 OT EVAL HIGH COMPLEX 60 MIN: CPT

## 2025-05-08 RX ORDER — AMOXICILLIN 250 MG
1 CAPSULE ORAL 2 TIMES DAILY
Status: DISCONTINUED | OUTPATIENT
Start: 2025-05-08 | End: 2025-05-09

## 2025-05-08 RX ADMIN — SENNOSIDES AND DOCUSATE SODIUM 1 TABLET: 50; 8.6 TABLET ORAL at 09:50

## 2025-05-08 RX ADMIN — PIPERACILLIN AND TAZOBACTAM 4.5 G: 36; 4.5 INJECTION, POWDER, LYOPHILIZED, FOR SOLUTION INTRAVENOUS at 17:35

## 2025-05-08 RX ADMIN — B-COMPLEX W/ C & FOLIC ACID TAB 1 TABLET: TAB at 09:50

## 2025-05-08 RX ADMIN — PIPERACILLIN AND TAZOBACTAM 4.5 G: 36; 4.5 INJECTION, POWDER, LYOPHILIZED, FOR SOLUTION INTRAVENOUS at 04:50

## 2025-05-08 RX ADMIN — ACETAMINOPHEN 650 MG: 325 TABLET, FILM COATED ORAL at 21:14

## 2025-05-08 RX ADMIN — HEPARIN SODIUM 5000 UNITS: 5000 INJECTION INTRAVENOUS; SUBCUTANEOUS at 21:14

## 2025-05-08 RX ADMIN — FOLIC ACID 1 MG: 1 TABLET ORAL at 09:50

## 2025-05-08 RX ADMIN — Medication 1 TABLET: at 09:53

## 2025-05-08 RX ADMIN — SODIUM BICARBONATE 100 ML/HR: 84 INJECTION, SOLUTION INTRAVENOUS at 09:50

## 2025-05-08 RX ADMIN — CALCIUM ACETATE 1334 MG: 667 CAPSULE ORAL at 09:50

## 2025-05-08 RX ADMIN — HEPARIN SODIUM 5000 UNITS: 5000 INJECTION INTRAVENOUS; SUBCUTANEOUS at 04:50

## 2025-05-08 RX ADMIN — HEPARIN SODIUM 5000 UNITS: 5000 INJECTION INTRAVENOUS; SUBCUTANEOUS at 14:29

## 2025-05-08 RX ADMIN — SENNOSIDES AND DOCUSATE SODIUM 1 TABLET: 50; 8.6 TABLET ORAL at 17:35

## 2025-05-08 RX ADMIN — Medication 4000 UNITS: at 09:50

## 2025-05-08 RX ADMIN — SODIUM BICARBONATE 100 ML/HR: 84 INJECTION, SOLUTION INTRAVENOUS at 22:31

## 2025-05-08 RX ADMIN — CALCIUM ACETATE 1334 MG: 667 CAPSULE ORAL at 17:35

## 2025-05-08 RX ADMIN — ARIPIPRAZOLE 20 MG: 10 TABLET ORAL at 21:14

## 2025-05-08 RX ADMIN — VENLAFAXINE HYDROCHLORIDE 225 MG: 150 CAPSULE, EXTENDED RELEASE ORAL at 09:53

## 2025-05-08 NOTE — PLAN OF CARE
Problem: PAIN - ADULT  Goal: Verbalizes/displays adequate comfort level or baseline comfort level  Description: Interventions:- Encourage patient to monitor pain and request assistance- Assess pain using appropriate pain scale- Administer analgesics based on type and severity of pain and evaluate response- Implement non-pharmacological measures as appropriate and evaluate response- Consider cultural and social influences on pain and pain management- Notify physician/advanced practitioner if interventions unsuccessful or patient reports new pain  Outcome: Progressing     Problem: INFECTION - ADULT  Goal: Absence or prevention of progression during hospitalization  Description: INTERVENTIONS:- Assess and monitor for signs and symptoms of infection- Monitor lab/diagnostic results- Monitor all insertion sites, i.e. indwelling lines, tubes, and drains- Monitor endotracheal if appropriate and nasal secretions for changes in amount and color- Bradley appropriate cooling/warming therapies per order- Administer medications as ordered- Instruct and encourage patient and family to use good hand hygiene technique- Identify and instruct in appropriate isolation precautions for identified infection/condition  Outcome: Progressing     Problem: INFECTION - ADULT  Goal: Absence of fever/infection during neutropenic period  Description: INTERVENTIONS:- Monitor WBC  Outcome: Progressing

## 2025-05-08 NOTE — ASSESSMENT & PLAN NOTE
- She has history of known anion gap metabolic acidosis due to presence of ileal conduit  - She currently has anion gap metabolic acidosis in setting of MONROE  - Serum bicarbonate level today 15 with anion gap of 14  - Agree with IV fluids with sodium bicarbonate as above

## 2025-05-08 NOTE — ASSESSMENT & PLAN NOTE
Gram-negative bacteremia in 2 out of 2 blood cultures  Patient started on Zosyn  Awaiting susceptibilities  See plan under sepsis

## 2025-05-08 NOTE — PLAN OF CARE
Problem: PHYSICAL THERAPY ADULT  Goal: Performs mobility at highest level of function for planned discharge setting.  See evaluation for individualized goals.  Description: Treatment/Interventions: Functional transfer training, Elevations, Therapeutic exercise, Endurance training, Patient/family training, Equipment eval/education, Bed mobility, Gait training, Spoke to nursing    See flowsheet documentation for full assessment, interventions and recommendations.  Outcome: Progressing  Note: Prognosis: Good  Problem List: Decreased endurance, Impaired balance, Decreased mobility, Decreased skin integrity  Assessment: Pt seen for PT evaluation for mobility assessment & discharge needs. Pt admitted 5/6/2025 w/ nephrostomy tube malfunction, dx Sepsis (HCC). During PT IE, pt completes all bed mobility in hospital bed with S, transfers STS with no AD and S, and ambulates 10ft with no AD however pt highly hesitant and requests utilization of AD/DME for increased support. During additional treatment time, pt progresses to ambulate an additional 160ft with RW and S. Displays a gait speed of 0.7m/s, qualifying pt as a limited community ambulator and at low risk of falls. Pt displays above outlined functional impairments & limitations, and presents below her baseline level of functional mobility. The AM-PAC & Barthel Index outcome tools were used to assist in determining pt safety w/ mobility/self care & appropriate d/c recommendations, see above for scores. Pt is at risk of falls d/t multiple comorbidities, impaired balance, use of ambulatory aid, varying levels of pain , acuity of medical illness, and ongoing medical treatment of primary dx. Pt's clinical presentation is currently unstable/unpredictable as seen in pt's presentation of changing level of pain, increased fall risk, new onset of impairment of functional mobility, and decreased endurance. Pt will benefit from continued PT services in order to address  impairments, decrease risk of falls, maximize independence w/ fnxl mobility, & ensure safety w/ mobility for ultimate return to home. Based on pt presentation & impairments, pt would most appropriately benefit from return to home with family support upon d/c, no additional skilled PT services anticipated. Recommend frequent bouts of supervised ambulation in hallway t/o remainder of her stay.        Rehab Resource Intensity Level, PT: No post-acute rehabilitation needs    See flowsheet documentation for full assessment.

## 2025-05-08 NOTE — PLAN OF CARE
Problem: OCCUPATIONAL THERAPY ADULT  Goal: Performs self-care activities at highest level of function for planned discharge setting.  See evaluation for individualized goals.  Description: Treatment Interventions: ADL retraining, Endurance training, Patient/family training, Equipment evaluation/education, Energy conservation, Activityengagement          See flowsheet documentation for full assessment, interventions and recommendations.   Note: Limitation: Decreased ADL status, Decreased self-care trans, Decreased high-level ADLs, Decreased endurance (dynamic balance)  Prognosis: Good  Assessment: Patient is a 69 y.o. female seen for OT evaluation at [facility] following admission on 5/6/2025  s/p Sepsis (HCC). Please see above for comprehensive list of comorbidities and significant PMHx impacting functional performance.  Upon initial evaluation, pt appears to be performing below baseline functional status.   Occupational performance is affected by the following deficits: endurance ,  decreased muscular strength , decreased balance , and decreased standing tolerance for self care tasks . 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 Supporting factors include: support system available to assist with care. Patient would benefit from OT services within the acute care setting to maximize level of functional independence in the following areas self-care transfers, functional mobility, and ADLs.  From OT standpoint, recommendation at time of D/C would be level 4: no rehabilitation needs .     Rehab Resource Intensity Level, OT: No post-acute rehabilitation needs

## 2025-05-08 NOTE — ASSESSMENT & PLAN NOTE
Elevated serum creatinine in the setting of CKD  Creatinine on admission 2.75  Baseline appears to be 2.2-2.5  Most likely in the setting of possible nephrostomy tube obstruction  B/l nephrostomy tube exchange 5/7 competed by IR; noted complete blockage of L nephrostomy tube    Plan:  Monitor daily BMP  Cr increased overnight to 3.59 from 3.17 despite being on isolyte 100 ml/hr for 24 hours  CO2 decreased to 15.  Patient started on bicarb drip  Nephrology on board.  Appreciate recommendations.

## 2025-05-08 NOTE — ASSESSMENT & PLAN NOTE
Etiology: Obstructive uropathy +/- ATN from complicated urinary infection leading to bacteremia  Baseline creatinine 2.0-2.5  Creatinine on admission 2.75 on 05/06  Creatinine today increased to 3.59 on 05/08  UA: 1+ protein, 20-30 RBC, innumerable bacteria  CT abdomen: Ileal conduit, left ureteral stent extends from left renal hilum through conduit and stoma, mild left hydronephrosis without hydroureter, moderate left perinephric inflammatory fat stranding, finding representing collecting system obstruction +/- UTIs  Status post IR procedure 05/07:   Left PCNU was completely occluded with stony debris, catheter was transected and cloudy urine immediately began draining consistent with occlusion at the hub  Contrast injection via transected catheter demonstrated patent catheter in good position,   Uncomplicated left retrograde PCNU exchange,   Right PCN was exchanged  Blood cultures growing Klebsiella  PCN cultures: Polymicrobial growth  Current Rx: IV fluids with sodium bicarbonate at 100 cc/h (ordered this morning)  Changes: Continue IV fluid with sodium bicarbonate  Watch out for postobstructive diuresis  I expect serum creatinine to improve in next 24 to 48 hours  No indication for renal replacement therapy  Trend BMP

## 2025-05-08 NOTE — ASSESSMENT & PLAN NOTE
- Etiology/risk factors: Hypertension, obstructive uropathy in setting of bilateral ureteral strictures, underlying glomerular pathology, age-related nephron loss   - Baseline Cr: 0.9-1.1 until 2022, then had several episodes of MONROE due to obstructive uropathy in setting of bilateral distal ureteral strictures and currently in the range of 2.0-2.5

## 2025-05-08 NOTE — ASSESSMENT & PLAN NOTE
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  IR noted complete blockage of left nephrostomy tube due to stones

## 2025-05-08 NOTE — PHYSICAL THERAPY NOTE
PHYSICAL THERAPY EVALUATION & TREATMENT  DATE: 05/08/25  TIME: 3341-8796    NAME:  Ragini Melendez  AGE:   69 y.o.  Mrn:   [id number]  Length Of Stay: 1    ADMIT DX:  Pyelonephritis [N12]  MONROE (acute kidney injury) (HCC) [N17.9]    Past Medical History:   Diagnosis Date    Anemia     Anxiety     Cancer (HCC)     ENDOMETRIAL    Chemotherapy induced neutropenia (HCC) 08/30/2019    Chronic kidney disease     CKD (chronic kidney disease) stage 3, GFR 30-59 ml/min (HCC)     COVID     Fall 2022    Depression     DVT (deep venous thrombosis) (HCC) 07/19/2019    RIGHT LEG    Endometrial cancer (HCC) 12/2017    GERD (gastroesophageal reflux disease)     H/O gastric bypass     Hard to intubate     pt denies AS OF 7/25/23    History of chemotherapy     started 12/2021endometrial cancer- done 12/23/22    History of DVT in adulthood     RLE    History of transfusion 04/13/2023    Hyperglycemia     vx type 2 dm -- last assessed 4/1/14; resolved 11/7/17    Hyperlipidemia     Hypertension     in past- no meds at present    Iron deficiency anemia     iron infusions weekly    Iron deficiency anemia secondary to inadequate dietary iron intake 02/08/2023    OAB (overactive bladder)     Port-A-Cath in place     Wears glasses      Past Surgical History:   Procedure Laterality Date    ABDOMINAL SURGERY      GASTRIC BYPASS; 2001    BREAST BIOPSY Right 06/28/2019    core biopsy; benign    CHOLECYSTECTOMY      at the time of gastric bypass    COLONOSCOPY      CT NEEDLE BIOPSY LYMPH NODE  07/08/2019    CYSTECTOMY, RADICAL WITH ILEOCONDUIT N/A 10/24/2024    Procedure: ILEAL CONDUIT urinary diversion (No cystectomy), BILATERAL UTERO-LYSIS;  Surgeon: Demetrius Lowe MD;  [facility];  Service: Urology    FL GUIDED CENTRAL VENOUS ACCESS DEVICE INSERTION  11/12/2019    FL RETROGRADE PYELOGRAM  03/30/2023    FL RETROGRADE PYELOGRAM  05/09/2023    FL RETROGRADE PYELOGRAM  8/1/2023    GASTRIC BYPASS      HYSTERECTOMY Bilateral 2017     total abdominal with salpingo-oophorectomy    IR NEPHROSTOMY TO NEPHROURETERAL STENT  06/09/2022    IR NEPHROSTOMY TO NEPHROURETERAL STENT  12/20/2022    IR NEPHROSTOMY TO NEPHROURETERAL STENT  8/8/2023    IR NEPHROSTOMY TO NEPHROURETERAL STENT  12/4/2024    IR NEPHROSTOMY TO NEPHROURETERAL STENT  3/6/2025    IR NEPHROSTOMY TUBE CHECK/CHANGE/REPOSITION/REINSERTION/UPSIZE  05/27/2022    IR NEPHROSTOMY TUBE CHECK/CHANGE/REPOSITION/REINSERTION/UPSIZE  11/10/2023    IR NEPHROSTOMY TUBE CHECK/CHANGE/REPOSITION/REINSERTION/UPSIZE  2/9/2024    IR NEPHROSTOMY TUBE CHECK/CHANGE/REPOSITION/REINSERTION/UPSIZE  4/12/2024    IR NEPHROSTOMY TUBE CHECK/CHANGE/REPOSITION/REINSERTION/UPSIZE  7/12/2024    IR NEPHROSTOMY TUBE CHECK/CHANGE/REPOSITION/REINSERTION/UPSIZE  10/16/2024    IR NEPHROSTOMY TUBE CHECK/CHANGE/REPOSITION/REINSERTION/UPSIZE  11/6/2024    IR NEPHROSTOMY TUBE PLACEMENT  07/26/2019    IR NEPHROSTOMY TUBE PLACEMENT  05/27/2023    IR NEPHROURETERAL STENT CHECK/CHANGE/REPOSITION  04/06/2021    IR NEPHROURETERAL STENT CHECK/CHANGE/REPOSITION  06/04/2021    IR NEPHROURETERAL STENT CHECK/CHANGE/REPOSITION  09/03/2021    IR NEPHROURETERAL STENT CHECK/CHANGE/REPOSITION  12/07/2021    IR NEPHROURETERAL STENT CHECK/CHANGE/REPOSITION  03/08/2022    IR NEPHROURETERAL STENT CHECK/CHANGE/REPOSITION  05/10/2022    IR NEPHROURETERAL STENT CHECK/CHANGE/REPOSITION  09/19/2022    IR PICC LINE  09/27/2019    IR PORT PLACEMENT  07/26/2019    IR PORT PLACEMENT      IR PORT REMOVAL  09/20/2019    OOPHORECTOMY Bilateral 2017    VT CYSTO W/INSERT URETERAL STENT Right 03/30/2023    Procedure: EXCHANGE STENT URETERAL;  Surgeon: Demetrius Lowe MD;  [facility];  Service: Urology    VT CYSTOURETHROSCOPY W/URETERAL CATHETERIZATION Right 03/30/2023    Procedure: CYSTOSCOPY RETROGRADE PYELOGRAM WITH exchange of STENT URETERAL;  Surgeon: Demetrius Lowe MD;  [facility];  Service: Urology    VT CYSTOURETHROSCOPY  W/URETERAL CATHETERIZATION Bilateral 05/09/2023    Procedure: CYSTOSCOPY, BILATERAL RETROGRADE PYELOGRAM, WITH LEFT INSERTION STENT URETERAL, RIGHT URTERAL STENT EXCHANGE;  Surgeon: Nicola Hannah MD;  [facility];  Service: Urology    AK CYSTOURETHROSCOPY W/URETERAL CATHETERIZATION Bilateral 8/1/2023    Procedure: CYSTOSCOPY BILATERAL RETROGRADE  WITH REMOVAL BILATERAL  STENT URETERAL;  Surgeon: Demetrius Lowe MD;  [facility];  Service: Urology    AK INSJ TUNNELED CTR VAD W/SUBQ PORT AGE 5 YR/> Left 11/12/2019    Procedure: INSERTION VENOUS PORT ( PORT-A-CATH) IR;  Surgeon: Edilberto Guzman DO;  [facility];  Service: Interventional Radiology    AK LAPS ABD PRTM&OMENTUM DX W/WO SPEC BR/WA SPX N/A 12/19/2017    Procedure: LAPAROSCOPY DIAGNOSTIC;  Surgeon: Evgeny So MD;  [facility];  Service: Gynecology Oncology    AK LAPS W/RAD HYST W/BILAT LMPHADEC RMVL TUBE/OVARY N/A 12/19/2017    Procedure: HYSTERECTOMY LAPAROSCOPIC TOTAL (LTH) W/ ROBOTICS; BILATERAL SALPINGOOPHERECTOMY; LYMPH NODE DISSECTION; lysis of adhesions;  Surgeon: Evgeny So MD;  [facility];  Service: Gynecology Oncology    REMOVAL URETERAL STENT Bilateral 8/1/2023    Procedure: REMOVAL STENT URETERAL;  Surgeon: Demetrius Lowe MD;  [facility];  Service: Urology    TONSILLECTOMY      US GUIDED BREAST BIOPSY RIGHT COMPLETE Right 06/28/2019       Performed at least 2 patient identifiers during session: Name, Birthday, ID bracelet, and Epic photo     05/08/25 1541   PT Last Visit   PT Visit Date 05/08/25   Note Type   Note type Evaluation  (& treatment)   Pain Assessment   Pain Assessment Tool 0-10   Pain Score No Pain   Restrictions/Precautions   Weight Bearing Precautions Per Order No   Other Precautions Contact/isolation;Chair Alarm;Bed Alarm;Multiple lines;Fall Risk  (MDRO, CRE, L chest portacath, nephrostomy, ostomy)   Home Living   Type of Home House   Home  "Layout Two level;Stairs to enter with rails;Bed/bath upstairs  (3 RAJANI, stair glide to 2nd floor bedroom, full bathroom on first floor)   Bathroom Shower/Tub Walk-in shower   Bathroom Toilet Standard   Bathroom Equipment Shower chair;Grab bars in shower   Bathroom Accessibility Accessible   Home Equipment Walker;Cane   Prior Function   Level of [place] Independent with ADLs;Independent with functional mobility;Independent with IADLS   Lives With Family  (elderly mother with dementia, pt brother)   Receives Help From Family  (brother, sister comes to stay on weekends)   IADLs Independent with driving;Independent with meal prep;Independent with medication management   Falls in the last 6 months 0  (pt denies)   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 Pt is primary caregiver for elderly mother with dementia, however also has excellent support from siblings, does not typically have to physically assist mother.   General   Additional Pertinent History Pt is a 69 yr old female admitted 5/6/25 with back pressure at site of internal nephrostomy tube. Dx: sepsis, malfunction of nephrostomy tube. Underwent IR drain replacement 5/7/25. PMH includes: anxiety, endometrial CA, CKD, COVID-19, falls, depression, gastric bypass surgery, RLE DVT, HTN   Family/Caregiver Present No   Cognition   Overall Cognitive Status WFL   Arousal/Participation Cooperative   Attention Within functional limits   Orientation Level Oriented X4   Memory Within functional limits   Following Commands Follows all commands and directions without difficulty   Subjective   Subjective \"I haven't had any pain since they replaced the tube. It feels so much better.\"   RUE Assessment   RUE Assessment WFL   LUE Assessment   LUE Assessment WFL   RLE Assessment   RLE Assessment WFL "   LLE Assessment   LLE Assessment WFL   Vision-Basic Assessment   Current Vision Wears glasses all the time   Patient Visual Report Other (Comment)  (denies acute visual changes)   Coordination   Movements are Fluid and Coordinated 1   Sensation WFL   Light Touch   RLE Light Touch Grossly intact   LLE Light Touch Grossly intact   Proprioception   RLE Proprioception Grossly intact   LLE Proprioception Grossly Intact   Self Selected Walking Speed   SSWS Trial 1 (Seconds) 7.75 Seconds   SSWS Trial 2 (Seconds) 7.42 Seconds   SSWS Trial 3 (Seconds) 7.81 Seconds   SSWS Average Time (Seconds) 7.7 seconds   SSWS Average Score (m/sec) 0.7 m/sec   Bed Mobility   Supine to Sit 5  Supervision   Additional items Assist x 1;HOB elevated;Bedrails;Increased time required;Verbal cues  (increased time and effort by pt however no physical assistance needed)   Transfers   Sit to Stand 5  Supervision   Additional items Assist x 1;Increased time required;Verbal cues  (no AD)   Stand to Sit 5  Supervision   Additional items Assist x 1;Increased time required;Verbal cues  (no AD)   Additional Comments Pt notably in high guard UE positioning upon standing, requests use of AD for increased support after short distance ambulation.   Ambulation/Elevation   Gait pattern Decreased foot clearance;Short stride;Decreased heel strike;Step through pattern   Gait Assistance 5  Supervision   Additional items Assist x 1;Verbal cues   Assistive Device None   Distance 10ft   Stair Management Assistance Not tested  (pt has 3 RAJANI her home then utilizes stair glide to 2nd floor bedroom)   Ambulation/Elevation Additional Comments After approx 10ft ambulation with no AD, pt requesting use of AD for increased support. See below for additional gait distance training into hallway with use of RW.   Balance   Static Sitting Good   Dynamic Sitting Fair +   Static Standing Fair  (w/ RW)   Dynamic Standing Fair  (w/ RW)   Ambulatory Fair  (w/ RW)   Endurance Deficit    Endurance Deficit Yes   Activity Tolerance   Activity Tolerance Patient limited by fatigue   Medical Staff Made Aware Spoke with RN, OT   Assessment   Prognosis Good   Problem List Decreased endurance;Impaired balance;Decreased mobility;Decreased skin integrity   Assessment Pt seen for PT evaluation for mobility assessment & discharge needs. Pt admitted 5/6/2025 w/ nephrostomy tube malfunction, dx Sepsis (HCC). During PT IE, pt completes all bed mobility in hospital bed with S, transfers STS with no AD and S, and ambulates 10ft with no AD however pt highly hesitant and requests utilization of AD/DME for increased support. During additional treatment time, pt progresses to ambulate an additional 160ft with RW and S. Displays a gait speed of 0.7m/s, qualifying pt as a limited community ambulator and at low risk of falls. Pt displays above outlined functional impairments & limitations, and presents below her baseline level of functional mobility. The AM-PAC & Barthel Index outcome tools were used to assist in determining pt safety w/ mobility/self care & appropriate d/c recommendations, see above for scores. Pt is at risk of falls d/t multiple comorbidities, impaired balance, use of ambulatory aid, varying levels of pain , acuity of medical illness, and ongoing medical treatment of primary dx. Pt's clinical presentation is currently unstable/unpredictable as seen in pt's presentation of changing level of pain, increased fall risk, new onset of impairment of functional mobility, and decreased endurance. Pt will benefit from continued PT services in order to address impairments, decrease risk of falls, maximize independence w/ fnxl mobility, & ensure safety w/ mobility for ultimate return to home. Based on pt presentation & impairments, pt would most appropriately benefit from return to home with family support upon d/c, no additional skilled PT services anticipated. Recommend frequent bouts of supervised ambulation in  hallway t/o remainder of her stay.   Goals   Patient Goals to return to home   STG Expiration Date 05/22/25   Short Term Goal #1 Pt will: complete all bed mobility independently in flat bed in order to promote increased OOB functional mobility and simulate home environment; complete all transfers with LRAD at indep/LEXI level in order to increase safety with functional mobility; ambulate >150ft with LRAD at indep/LEXI level in order to increase safety with household and short community functional mobility; negotiate 3-5 stairs with HR assist at LEXI level in order to facilitate safe access to her home; demonstrate understanding and independence with LE strengthening HEP; improve ambulatory balance to >/= good grade with LRAD in order to promote safety and increased independence with mobility; tolerate >3hrs OOB in upright position, in order to improve muscular endurance and respiratory status; improve AM-PAC score to >/= 24/24 in order to increase independence with mobility and decrease burden of care; improve Barthel Index score to >/= 60/100 in order to increase independence and decrease risk of falls.   PT Treatment Day 1   Plan   Treatment/Interventions Functional transfer training;Elevations;Therapeutic exercise;Endurance training;Patient/family training;Equipment eval/education;Bed mobility;Gait training;Spoke to nursing   PT Frequency 2-3x/wk   Discharge Recommendation   Rehab Resource Intensity Level, PT No post-acute rehabilitation needs   AM-PAC Basic Mobility Inpatient   Turning in Flat Bed Without Bedrails 4   Lying on Back to Sitting on Edge of Flat Bed Without Bedrails 3   Moving Bed to Chair 3   Standing Up From Chair Using Arms 3   Walk in Room 3   Climb 3-5 Stairs With Railing 3   Basic Mobility Inpatient Raw Score 19   Basic Mobility Standardized Score 42.48   [facility] Highest Level Of Mobility   -HLM Goal 6: Walk 10 steps or more   -HLM Achieved 7: Walk 25 feet or more   Modified [place]  Scale   Modified [place] Scale 3   Barthel Index   Feeding 10   Bathing 0   Grooming Score 5   Dressing Score 5   Bladder Score 0   Bowels Score 0   Toilet Use Score 5   Transfers (Bed/Chair) Score 10   Mobility (Level Surface) Score 10   Stairs Score 5   Barthel Index Score 50   Additional Treatment Session   Start Time 1531   End Time 1541   Treatment Assessment Pt is agreeable to participate in additional gait distance training post IE. Pt progresses to ambulate an additional 160ft with RW and S into hallway. Gait deviations: dec foot clearance B, short stride B, fwd flexion. Pt displays a gait speed of 0.7m/s, qualifying pt as a limited community ambulator and at low risk of falls. Pt denies ligtheadedness/dizziness, pain, or SOB with ambulation training. Upon return to room, pt declines remaining OOB in recliner chair and returns to semi-supine in bed with S. Pt was left as found, all needs in reach, care returned to RN. Regarding functional mobility, pt displays functional capacity for return to home upon d/c, however requires continued PT services in the acute care setting. Continue to recommend increased social supports once medically cleared for d/c from the acute care setting. Will continue skilled PT POC as able and appropriate to address functional impairments and progress towards therapy goals.   Additional Treatment Day 1   End of Consult   Patient Position at End of Consult Supine;Bed/Chair alarm activated;All needs within reach     Based on patient's [facility] Highest Level of Mobility scores today, patient currently has a goal of [facility] Levels: 7: WALK 25 FEET OR MORE, to be completed with RN staffing each shift, in order to improve overall activity tolerance and mobility, combat hospital related deconditioning, and maximize outcomes for d/c from the acute care setting.     The patient's AM-PAC Basic Mobility Inpatient Short Form Raw Score is 19. A Raw score of greater than 16 suggests the patient  may benefit from discharge to home. Please also refer to the recommendation of the Physical Therapist for safe discharge planning.      Jessie Acevedo PT, DPT   Available via [employer]  NPI # [id number]  [place] License - [id number]  5/8/2025

## 2025-05-08 NOTE — ASSESSMENT & PLAN NOTE
Mild hyponatremia at 134 on admission  Magnesium decreased at 1.7    Plan:  Encourage oral intake and hydration  Replete electrolytes as clinically indicated

## 2025-05-08 NOTE — ASSESSMENT & PLAN NOTE
Presenting with left-sided flank pain which began abruptly earlier in the day  No change in recent physical activity, no recent trauma, no association with diet  Given CT findings listed above suspect possible pyelonephritis, or left-sided nephrostomy tube malfunction  Pain currently controlled

## 2025-05-08 NOTE — OCCUPATIONAL THERAPY NOTE
Occupational Therapy Evaluation     Patient Name: Ragini Melendez  Today's Date: 5/8/2025  Problem List  Principal Problem:    Sepsis (HCC)  Active Problems:    Electrolyte abnormality    Gram-negative bacteremia    High anion gap metabolic acidosis    CKD (chronic kidney disease)    Acute kidney injury (HCC)    Past Medical History  Past Medical History:   Diagnosis Date    Anemia     Anxiety     Cancer (HCC)     ENDOMETRIAL    Chemotherapy induced neutropenia (HCC) 08/30/2019    Chronic kidney disease     CKD (chronic kidney disease) stage 3, GFR 30-59 ml/min (HCC)     COVID     Fall 2022    Depression     DVT (deep venous thrombosis) (HCC) 07/19/2019    RIGHT LEG    Endometrial cancer (HCC) 12/2017    GERD (gastroesophageal reflux disease)     H/O gastric bypass     Hard to intubate     pt denies AS OF 7/25/23    History of chemotherapy     started 12/2021endometrial cancer- done 12/23/22    History of DVT in adulthood     RLE    History of transfusion 04/13/2023    Hyperglycemia     vx type 2 dm -- last assessed 4/1/14; resolved 11/7/17    Hyperlipidemia     Hypertension     in past- no meds at present    Iron deficiency anemia     iron infusions weekly    Iron deficiency anemia secondary to inadequate dietary iron intake 02/08/2023    OAB (overactive bladder)     Port-A-Cath in place     Wears glasses      Past Surgical History  Past Surgical History:   Procedure Laterality Date    ABDOMINAL SURGERY      GASTRIC BYPASS; 2001    BREAST BIOPSY Right 06/28/2019    core biopsy; benign    CHOLECYSTECTOMY      at the time of gastric bypass    COLONOSCOPY      CT NEEDLE BIOPSY LYMPH NODE  07/08/2019    CYSTECTOMY, RADICAL WITH ILEOCONDUIT N/A 10/24/2024    Procedure: ILEAL CONDUIT urinary diversion (No cystectomy), BILATERAL UTERO-LYSIS;  Surgeon: Demetrius Lowe MD;  [facility];  Service: Urology    FL GUIDED CENTRAL VENOUS ACCESS DEVICE INSERTION  11/12/2019    FL RETROGRADE PYELOGRAM   03/30/2023    FL RETROGRADE PYELOGRAM  05/09/2023    FL RETROGRADE PYELOGRAM  8/1/2023    GASTRIC BYPASS      HYSTERECTOMY Bilateral 2017    total abdominal with salpingo-oophorectomy    IR NEPHROSTOMY TO NEPHROURETERAL STENT  06/09/2022    IR NEPHROSTOMY TO NEPHROURETERAL STENT  12/20/2022    IR NEPHROSTOMY TO NEPHROURETERAL STENT  8/8/2023    IR NEPHROSTOMY TO NEPHROURETERAL STENT  12/4/2024    IR NEPHROSTOMY TO NEPHROURETERAL STENT  3/6/2025    IR NEPHROSTOMY TUBE CHECK/CHANGE/REPOSITION/REINSERTION/UPSIZE  05/27/2022    IR NEPHROSTOMY TUBE CHECK/CHANGE/REPOSITION/REINSERTION/UPSIZE  11/10/2023    IR NEPHROSTOMY TUBE CHECK/CHANGE/REPOSITION/REINSERTION/UPSIZE  2/9/2024    IR NEPHROSTOMY TUBE CHECK/CHANGE/REPOSITION/REINSERTION/UPSIZE  4/12/2024    IR NEPHROSTOMY TUBE CHECK/CHANGE/REPOSITION/REINSERTION/UPSIZE  7/12/2024    IR NEPHROSTOMY TUBE CHECK/CHANGE/REPOSITION/REINSERTION/UPSIZE  10/16/2024    IR NEPHROSTOMY TUBE CHECK/CHANGE/REPOSITION/REINSERTION/UPSIZE  11/6/2024    IR NEPHROSTOMY TUBE PLACEMENT  07/26/2019    IR NEPHROSTOMY TUBE PLACEMENT  05/27/2023    IR NEPHROURETERAL STENT CHECK/CHANGE/REPOSITION  04/06/2021    IR NEPHROURETERAL STENT CHECK/CHANGE/REPOSITION  06/04/2021    IR NEPHROURETERAL STENT CHECK/CHANGE/REPOSITION  09/03/2021    IR NEPHROURETERAL STENT CHECK/CHANGE/REPOSITION  12/07/2021    IR NEPHROURETERAL STENT CHECK/CHANGE/REPOSITION  03/08/2022    IR NEPHROURETERAL STENT CHECK/CHANGE/REPOSITION  05/10/2022    IR NEPHROURETERAL STENT CHECK/CHANGE/REPOSITION  09/19/2022    IR PICC LINE  09/27/2019    IR PORT PLACEMENT  07/26/2019    IR PORT PLACEMENT      IR PORT REMOVAL  09/20/2019    OOPHORECTOMY Bilateral 2017    DC CYSTO W/INSERT URETERAL STENT Right 03/30/2023    Procedure: EXCHANGE STENT URETERAL;  Surgeon: Demetrius Lowe MD;  [facility];  Service: Urology    DC CYSTOURETHROSCOPY W/URETERAL CATHETERIZATION Right 03/30/2023    Procedure: CYSTOSCOPY RETROGRADE  PYELOGRAM WITH exchange of STENT URETERAL;  Surgeon: Demetrius Lowe MD;  [facility];  Service: Urology    GA CYSTOURETHROSCOPY W/URETERAL CATHETERIZATION Bilateral 05/09/2023    Procedure: CYSTOSCOPY, BILATERAL RETROGRADE PYELOGRAM, WITH LEFT INSERTION STENT URETERAL, RIGHT URTERAL STENT EXCHANGE;  Surgeon: Nicola Hannah MD;  [facility];  Service: Urology    GA CYSTOURETHROSCOPY W/URETERAL CATHETERIZATION Bilateral 8/1/2023    Procedure: CYSTOSCOPY BILATERAL RETROGRADE  WITH REMOVAL BILATERAL  STENT URETERAL;  Surgeon: Demetrius Lowe MD;  [facility];  Service: Urology    GA INSJ TUNNELED CTR VAD W/SUBQ PORT AGE 5 YR/> Left 11/12/2019    Procedure: INSERTION VENOUS PORT ( PORT-A-CATH) IR;  Surgeon: Edilberto Guzman DO;  [facility];  Service: Interventional Radiology    GA LAPS ABD PRTM&OMENTUM DX W/WO SPEC BR/WA SPX N/A 12/19/2017    Procedure: LAPAROSCOPY DIAGNOSTIC;  Surgeon: Evgeny So MD;  [facility];  Service: Gynecology Oncology    GA LAPS W/RAD HYST W/BILAT LMPHADEC RMVL TUBE/OVARY N/A 12/19/2017    Procedure: HYSTERECTOMY LAPAROSCOPIC TOTAL (LTH) W/ ROBOTICS; BILATERAL SALPINGOOPHERECTOMY; LYMPH NODE DISSECTION; lysis of adhesions;  Surgeon: Evgeny So MD;  [facility];  Service: Gynecology Oncology    REMOVAL URETERAL STENT Bilateral 8/1/2023    Procedure: REMOVAL STENT URETERAL;  Surgeon: Demetrius Lowe MD;  [facility];  Service: Urology    TONSILLECTOMY      US GUIDED BREAST BIOPSY RIGHT COMPLETE Right 06/28/2019 05/08/25 1525   OT Last Visit   OT Visit Date 05/08/25   Note Type   Note type Evaluation   Pain Assessment   Pain Assessment Tool 0-10   Pain Score No Pain   Restrictions/Precautions   Weight Bearing Precautions Per Order No   Other Precautions Chair Alarm;Bed Alarm;Contact/isolation;Fall Risk  (MDRO + CRE +; L chest port a cath, ostomy, nephrostomy tube)   Home Living   Type of  Home House   Home Layout Two level;Bed/bath upstairs;Stairs to enter with rails  (3 RAJANI, stairglide to 2nd floor, full bath on first floor)   Bathroom Shower/Tub Walk-in shower   Bathroom Toilet Standard   Bathroom Equipment Shower chair   Bathroom Accessibility Accessible   Home Equipment Cane;Walker  (no AD used at baseline)   Prior Function   Level of [place] Independent with ADLs;Independent with functional mobility;Independent with IADLS   Lives With Family  (elderly mother, pt's brother)   Receives Help From Family  (sister, brother)   IADLs Independent with driving;Independent with meal prep;Independent with medication management   Falls in the last 6 months 0   Vocational   (caregiver)   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 Lives c family. (-) falls. + driving   Reciprocal Relationships brother, sister, mother   Service to Others caregiver   General   Additional Pertinent History Pt admitted d/t sepsis, s/p bilateral nephrostomy tube exchange by IR 5/7/2025. Complicated by [place], high anian gap metabolic, gram negative bacteremia   Family/Caregiver Present No   Subjective   Subjective Pt reports feeling better than on initial presentation to hospital.   ADL   Where Assessed Edge of bed   Eating Assistance 6  Modified independent   Grooming Assistance 6  Modified Independent   UB Bathing Assistance 5  Supervision/Setup   LB Bathing Assistance 5  Supervision/Setup   UB Dressing Assistance 5  Supervision/Setup   LB Dressing Assistance 5  Supervision/Setup   Toileting Assistance  5  Supervision/Setup   Functional Assistance 5  Supervision/Setup   Bed Mobility   Supine to Sit 5  Supervision   Additional items Increased time required   Sit to Supine 5  Supervision   Additional items Increased time required   Transfers   Sit to Stand 5  Supervision   Additional  items Assist x 1;Increased time required;Verbal cues   Stand to Sit 5  Supervision   Additional items Increased time required;Verbal cues;Assist x 1   Additional Comments no AD used during transfers   Functional Mobility   Functional Mobility 5  Supervision   Additional Comments community distance, slow paced with increased time. no LOB   Additional items Rolling walker   Balance   Static Sitting Good   Dynamic Sitting Fair +   Static Standing Fair   Dynamic Standing Fair   Activity Tolerance   Activity Tolerance Patient limited by fatigue   Medical Staff Made Aware Care coordination c PT Danita   Nurse Made Aware RN made aware of outcomes at end of session   RUE Assessment   RUE Assessment WFL  (MMT grossly 4/5 based on functional assessment)   LUE Assessment   LUE Assessment WFL  (MMT grossly 4/5 based on functional assessment)   Hand Function   Gross Motor Coordination Functional   Fine Motor Coordination Functional   Sensation   Light Touch No apparent deficits   Vision-Basic Assessment   Current Vision Wears glasses all the time   Cognition   Overall Cognitive Status WFL   Arousal/Participation Alert;Cooperative   Attention Within functional limits   Orientation Level Oriented X4   Memory Within functional limits   Following Commands Follows all commands and directions without difficulty   Comments Appropriate safety awareness demonstrated   Assessment   Limitation Decreased ADL status;Decreased self-care trans;Decreased high-level ADLs;Decreased endurance  (dynamic balance)   Prognosis Good   Assessment Patient is a 69 y.o. female seen for OT evaluation at [facility] following admission on 5/6/2025  s/p Sepsis (HCC). Please see above for comprehensive list of comorbidities and significant PMHx impacting functional performance.  Upon initial evaluation, pt appears to be performing below baseline functional status.   Occupational performance is affected by the following deficits: endurance ,   decreased muscular strength , decreased balance , and decreased standing tolerance for self care tasks . 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 Supporting factors include: support system available to assist with care. Patient would benefit from OT services within the acute care setting to maximize level of functional independence in the following areas self-care transfers, functional mobility, and ADLs.  From OT standpoint, recommendation at time of D/C would be level 4: no rehabilitation needs .   Goals   Patient Goals to return home   Plan   Treatment Interventions ADL retraining;Endurance training;Patient/family training;Equipment evaluation/education;Energy conservation;Activityengagement   Goal Expiration Date 05/18/25   OT Treatment Day 0   OT Frequency 1-2x/wk   Discharge Recommendation   Rehab Resource Intensity Level, OT No post-acute rehabilitation needs   Additional Comments 2 The patient's raw score on the AM-PAC Daily Activity Inpatient Short Form is 20. A raw score of greater than or equal to 19 suggests the patient may benefit from discharge to home. Please refer to the recommendation of the Occupational Therapist for safe discharge planning.   AM-PAC Daily Activity Inpatient   Lower Body Dressing 3   Bathing 3   Toileting 3   Upper Body Dressing 3   Grooming 4   Eating 4   Daily Activity Raw Score 20   Daily Activity Standardized Score (Calc for Raw Score >=11) 42.03   AM-PAC Applied Cognition Inpatient   Following a Speech/Presentation 4   Understanding Ordinary Conversation 4   Taking Medications 4   Remembering Where Things Are Placed or Put Away 4   Remembering List of 4-5 Errands 4   Taking Care of Complicated Tasks 4   Applied Cognition Raw Score 24   Applied Cognition Standardized Score 62.21   End of Consult   Education Provided Yes   Patient Position at End of Consult Bed/Chair alarm activated;All needs within reach;Supine  (declines OOB to  chair)   Nurse Communication Nurse aware of consult   Goals established on initial evaluation in order to achieve pt's goal of returning home    Pt will complete UB ADLs Mod independent   for increased ADL independence within 10 days.     Pt will complete LB ADLs Mod independent   for increased ADL independence within 10 days.     Pt will complete toileting Mod independent   with use of DME for increased ADL independence within 10 days.     Pt will demonstrate proper body mechanics to complete self-care transfers and functional mobility with Mod independent  and use of LRAD for increased safety and functional independence within 10 days.     Pt will demonstrate OOB sitting tolerance of 2-4 hr/day for increased activity tolerance and engagement in leisure activities within 10 days.       Pt benefited from co-session of skilled OT and PT therapists in order to most appropriately address functional deficits d/t decreased activity tolerance.  OT/PT objectives were addressed separately; please see PT note for specific goal areas targeted.    Eliane Lopez, OT

## 2025-05-08 NOTE — PLAN OF CARE
Problem: Potential for Falls  Goal: Patient will remain free of falls  Description: INTERVENTIONS:- Educate patient/family on patient safety including physical limitations- Instruct patient to call for assistance with activity - Consult OT/PT to assist with strengthening/mobility - Keep Call bell within reach- Keep bed low and locked with side rails adjusted as appropriate- Keep care items and personal belongings within reach- Initiate and maintain comfort rounds- Make Fall Risk Sign visible to staff- Offer Toileting every 2 Hours, in advance of need- Initiate/Maintain bed alarm- Obtain necessary fall risk management equipment: - Apply yellow socks and bracelet for high fall risk patients- Consider moving patient to room near nurses station  INTERVENTIONS:- Educate patient/family on patient safety including physical limitations- Instruct patient to call for assistance with activity - Consult OT/PT to assist with strengthening/mobility - Keep Call bell within reach- Keep bed low and locked with side rails adjusted as appropriate- Keep care items and personal belongings within reach- Initiate and maintain comfort rounds- Make Fall Risk Sign visible to staff- Offer Toileting every 2 Hours, in advance of need- Initiate/Maintain bed alarm- Obtain necessary fall risk management equipment: - Apply yellow socks and bracelet for high fall risk patients- Consider moving patient to room near nurses station  Outcome: Progressing     Problem: Prexisting or High Potential for Compromised Skin Integrity  Goal: Skin integrity is maintained or improved  Description: INTERVENTIONS:- Identify patients at risk for skin breakdown- Assess and monitor skin integrity- Assess and monitor nutrition and hydration status- Monitor labs - Assess for incontinence - Turn and reposition patient- Assist with mobility/ambulation- Relieve pressure over bony prominences- Avoid friction and shearing- Provide appropriate hygiene as needed including  keeping skin clean and dry- Evaluate need for skin moisturizer/barrier cream- Collaborate with interdisciplinary team - Patient/family teaching- Consider wound care consult   Outcome: Progressing     Problem: PAIN - ADULT  Goal: Verbalizes/displays adequate comfort level or baseline comfort level  Description: Interventions:- Encourage patient to monitor pain and request assistance- Assess pain using appropriate pain scale- Administer analgesics based on type and severity of pain and evaluate response- Implement non-pharmacological measures as appropriate and evaluate response- Consider cultural and social influences on pain and pain management- Notify physician/advanced practitioner if interventions unsuccessful or patient reports new pain  Outcome: Progressing     Problem: INFECTION - ADULT  Goal: Absence or prevention of progression during hospitalization  Description: INTERVENTIONS:- Assess and monitor for signs and symptoms of infection- Monitor lab/diagnostic results- Monitor all insertion sites, i.e. indwelling lines, tubes, and drains- Monitor endotracheal if appropriate and nasal secretions for changes in amount and color- [place] appropriate cooling/warming therapies per order- Administer medications as ordered- Instruct and encourage patient and family to use good hand hygiene technique- Identify and instruct in appropriate isolation precautions for identified infection/condition  Outcome: Progressing  Goal: Absence of fever/infection during neutropenic period  Description: INTERVENTIONS:- Monitor WBC  Outcome: Progressing     Problem: SAFETY ADULT  Goal: Patient will remain free of falls  Description: INTERVENTIONS:- Educate patient/family on patient safety including physical limitations- Instruct patient to call for assistance with activity - Consult OT/PT to assist with strengthening/mobility - Keep Call bell within reach- Keep bed low and locked with side rails adjusted as appropriate- Keep care  items and personal belongings within reach- Initiate and maintain comfort rounds- Make Fall Risk Sign visible to staff- Offer Toileting every 2 Hours, in advance of need- Initiate/Maintain alarm- Obtain necessary fall risk management equipment: - Apply yellow socks and bracelet for high fall risk patients- Consider moving patient to room near nurses station  INTERVENTIONS:- Educate patient/family on patient safety including physical limitations- Instruct patient to call for assistance with activity - Consult OT/PT to assist with strengthening/mobility - Keep Call bell within reach- Keep bed low and locked with side rails adjusted as appropriate- Keep care items and personal belongings within reach- Initiate and maintain comfort rounds- Make Fall Risk Sign visible to staff- Offer Toileting every 2 Hours, in advance of need- Initiate/Maintain bed alarm- Obtain necessary fall risk management equipment: - Apply yellow socks and bracelet for high fall risk patients- Consider moving patient to room near nurses station  Outcome: Progressing  Goal: Maintain or return to baseline ADL function  Description: INTERVENTIONS:-  Assess patient's ability to carry out ADLs; assess patient's baseline for ADL function and identify physical deficits which impact ability to perform ADLs (bathing, care of mouth/teeth, toileting, grooming, dressing, etc.)- Assess/evaluate cause of self-care deficits - Assess range of motion- Assess patient's mobility; develop plan if impaired- Assess patient's need for assistive devices and provide as appropriate- Encourage maximum independence but intervene and supervise when necessary- Involve family in performance of ADLs- Assess for home care needs following discharge - Consider OT consult to assist with ADL evaluation and planning for discharge- Provide patient education as appropriate  Outcome: Progressing     Problem: DISCHARGE PLANNING  Goal: Discharge to home or other facility with appropriate  resources  Description: INTERVENTIONS:- Identify barriers to discharge w/patient and caregiver- Arrange for needed discharge resources and transportation as appropriate- Identify discharge learning needs (meds, wound care, etc.)- Arrange for interpretive services to assist at discharge as needed- Refer to Case Management Department for coordinating discharge planning if the patient needs post-hospital services based on physician/advanced practitioner order or complex needs related to functional status, cognitive ability, or social support system  Outcome: Progressing

## 2025-05-08 NOTE — ASSESSMENT & PLAN NOTE
"69-year-old female past medical history endometrial cancer s/p resection with last dose of chemotherapy 2022, CKD with right-sided nephrostomy tube, left sided tunneled nephrostomy tube ileal conduit, and hypertension presented with sudden onset left flank pain that began 5/6/2025.  Patient reported that she had decreased urine output in her nephrostomy tubes per her usual.  States that urine in right kidney was significantly diluted; does note that right kidney is \"on its way out\" so does not typically produce a significant amount of urine.  Noted that urine from left kidney was dark in color.  Patient states that nephrostomy tube typically changed every 3 months.  Last changed March 2025.  Patient met SIRS criteria on admission due to white blood cell count of 23K and heart rate of 98 bpm.  Tmax 99.4.  Lactic acid within normal limits.  Blood pressure stable.  Blood cultures positive for 2 out of 2 gram-negative rods both from right arm draw.  Suspect bacteremia secondary to UTI/pyelonephritis   Patient has successful exchange of bilateral nephrostomy tubes.  Per IR, left nephrostomy tube was completely blocked due to stones.  Suspect likely contributing to her elevated creatinine.  CT abdomen and pelvis: Mild left-sided hydronephrosis with moderate perinephric inflammatory fat stranding left side.  Possible pancolitis  Patient denies any fever or chills.  She denies any abdominal pain, nausea, vomiting, or diarrhea.    Plan:  Started on zosyn 5/7 for 2/2 gram negative bacteremia. Will continue and adjust based on susceptibilities   Monitor nephrostomy tube drainage b/l  Continue to trend CBC and fever curve  WBC improved from 25 K to 16 K   PT/OT consulted.  Appreciate recommendations  "

## 2025-05-08 NOTE — CONSULTS
NEPHROLOGY HOSPITAL CONSULTATION   Ragini Melendez 69 y.o. female MRN: [id number]  Unit/Bed#: -01 Encounter: [id number]    Brief History of Admission Ragini Melendez is a 69 y.o. female who was admitted to [facility] after presenting with left-sided flank pain. A renal consultation is requested today for assistance in the management of MONROE on CKD.      Assessment & Plan  Acute kidney injury (HCC)  Etiology: Obstructive uropathy +/- ATN from complicated urinary infection leading to bacteremia  Baseline creatinine 2.0-2.5  Creatinine on admission 2.75 on 05/06  Creatinine today increased to 3.59 on 05/08  UA: 1+ protein, 20-30 RBC, innumerable bacteria  CT abdomen: Ileal conduit, left ureteral stent extends from left renal hilum through conduit and stoma, mild left hydronephrosis without hydroureter, moderate left perinephric inflammatory fat stranding, finding representing collecting system obstruction +/- UTIs  Status post IR procedure 05/07:   Left PCNU was completely occluded with stony debris, catheter was transected and cloudy urine immediately began draining consistent with occlusion at the hub  Contrast injection via transected catheter demonstrated patent catheter in good position,   Uncomplicated left retrograde PCNU exchange,   Right PCN was exchanged  Blood cultures growing Klebsiella  PCN cultures: Polymicrobial growth  Current Rx: IV fluids with sodium bicarbonate at 100 cc/h (ordered this morning)  Changes: Continue IV fluid with sodium bicarbonate  Watch out for postobstructive diuresis  I expect serum creatinine to improve in next 24 to 48 hours  No indication for renal replacement therapy  Trend BMP  SIRS (systemic inflammatory response syndrome) ([facility])  - Management per primary team  Hyponatremia  - Sodium level stable at 134  - Continue to monitor with isotonic fluids  CKD (chronic kidney disease)  - Etiology/risk factors: Hypertension, obstructive uropathy in setting of  bilateral ureteral strictures, underlying glomerular pathology, age-related nephron loss   - Baseline Cr: 0.9-1.1 until 2022, then had several episodes of MONROE due to obstructive uropathy in setting of bilateral distal ureteral strictures and currently in the range of 2.0-2.5  Malfunction of nephrostomy tube (HCC)  - Noted as above  High anion gap metabolic acidosis  - She has history of known anion gap metabolic acidosis due to presence of ileal conduit  - She currently has anion gap metabolic acidosis in setting of MONROE  - Serum bicarbonate level today 15 with anion gap of 14  - Agree with IV fluids with sodium bicarbonate as above    I have reviewed the nephrology recommendations including etiology of MONROE being obstructive uropathy +/- ATN in setting of bacteremic infection and to switch IV fluids to sodium bicarbonate drip, with internal medicine, and we are in agreement with renal plan including the information outlined above.    HISTORY OF PRESENT ILLNESS:  Requesting Physician: Nathan Romero DO  Reason for Consult: MONROE on CKD    Ragini Melendez is a 69 y.o. female who was admitted to [facility] after presenting with left-sided flank pain. A renal consultation is requested today for assistance in the management of MONROE on CKD.  She is well-known to me from outpatient for CKD management.  She has history of endometrial cancer status post resection with last chemotherapy in 2022, CKD with right-sided nephrostomy tube, left-sided tunneled nephrostomy tube, ileal conduit, hypertension who presented with left-sided flank pain.  CT scan showed findings consistent with obstruction of collecting system.  She is status post IR procedure.  Creatinine has been worsening during this hospitalization.  Nephrology consulted for further management.    PAST MEDICAL HISTORY:  Past Medical History:   Diagnosis Date    Anemia     Anxiety     Cancer (HCC)     ENDOMETRIAL    Chemotherapy induced neutropenia (HCC)  08/30/2019    Chronic kidney disease     CKD (chronic kidney disease) stage 3, GFR 30-59 ml/min (HCC)     COVID     Fall 2022    Depression     DVT (deep venous thrombosis) ([facility]) 07/19/2019    RIGHT LEG    Endometrial cancer (HCC) 12/2017    GERD (gastroesophageal reflux disease)     H/O gastric bypass     Hard to intubate     pt denies AS OF 7/25/23    History of chemotherapy     started 12/2021endometrial cancer- done 12/23/22    History of DVT in adulthood     RLE    History of transfusion 04/13/2023    Hyperglycemia     vx type 2 dm -- last assessed 4/1/14; resolved 11/7/17    Hyperlipidemia     Hypertension     in past- no meds at present    Iron deficiency anemia     iron infusions weekly    Iron deficiency anemia secondary to inadequate dietary iron intake 02/08/2023    OAB (overactive bladder)     Port-A-Cath in place     Wears glasses        PAST SURGICAL HISTORY:  Past Surgical History:   Procedure Laterality Date    ABDOMINAL SURGERY      GASTRIC BYPASS; 2001    BREAST BIOPSY Right 06/28/2019    core biopsy; benign    CHOLECYSTECTOMY      at the time of gastric bypass    COLONOSCOPY      CT NEEDLE BIOPSY LYMPH NODE  07/08/2019    CYSTECTOMY, RADICAL WITH ILEOCONDUIT N/A 10/24/2024    Procedure: ILEAL CONDUIT urinary diversion (No cystectomy), BILATERAL UTERO-LYSIS;  Surgeon: Demetrius Lowe MD;  [facility];  Service: Urology    FL GUIDED CENTRAL VENOUS ACCESS DEVICE INSERTION  11/12/2019    FL RETROGRADE PYELOGRAM  03/30/2023    FL RETROGRADE PYELOGRAM  05/09/2023    FL RETROGRADE PYELOGRAM  8/1/2023    GASTRIC BYPASS      HYSTERECTOMY Bilateral 2017    total abdominal with salpingo-oophorectomy    IR NEPHROSTOMY TO NEPHROURETERAL STENT  06/09/2022    IR NEPHROSTOMY TO NEPHROURETERAL STENT  12/20/2022    IR NEPHROSTOMY TO NEPHROURETERAL STENT  8/8/2023    IR NEPHROSTOMY TO NEPHROURETERAL STENT  12/4/2024    IR NEPHROSTOMY TO NEPHROURETERAL STENT  3/6/2025    IR NEPHROSTOMY TUBE  CHECK/CHANGE/REPOSITION/REINSERTION/UPSIZE  05/27/2022    IR NEPHROSTOMY TUBE CHECK/CHANGE/REPOSITION/REINSERTION/UPSIZE  11/10/2023    IR NEPHROSTOMY TUBE CHECK/CHANGE/REPOSITION/REINSERTION/UPSIZE  2/9/2024    IR NEPHROSTOMY TUBE CHECK/CHANGE/REPOSITION/REINSERTION/UPSIZE  4/12/2024    IR NEPHROSTOMY TUBE CHECK/CHANGE/REPOSITION/REINSERTION/UPSIZE  7/12/2024    IR NEPHROSTOMY TUBE CHECK/CHANGE/REPOSITION/REINSERTION/UPSIZE  10/16/2024    IR NEPHROSTOMY TUBE CHECK/CHANGE/REPOSITION/REINSERTION/UPSIZE  11/6/2024    IR NEPHROSTOMY TUBE PLACEMENT  07/26/2019    IR NEPHROSTOMY TUBE PLACEMENT  05/27/2023    IR NEPHROURETERAL STENT CHECK/CHANGE/REPOSITION  04/06/2021    IR NEPHROURETERAL STENT CHECK/CHANGE/REPOSITION  06/04/2021    IR NEPHROURETERAL STENT CHECK/CHANGE/REPOSITION  09/03/2021    IR NEPHROURETERAL STENT CHECK/CHANGE/REPOSITION  12/07/2021    IR NEPHROURETERAL STENT CHECK/CHANGE/REPOSITION  03/08/2022    IR NEPHROURETERAL STENT CHECK/CHANGE/REPOSITION  05/10/2022    IR NEPHROURETERAL STENT CHECK/CHANGE/REPOSITION  09/19/2022    IR PICC LINE  09/27/2019    IR PORT PLACEMENT  07/26/2019    IR PORT PLACEMENT      IR PORT REMOVAL  09/20/2019    OOPHORECTOMY Bilateral 2017    IL CYSTO W/INSERT URETERAL STENT Right 03/30/2023    Procedure: EXCHANGE STENT URETERAL;  Surgeon: Demetrius Lowe MD;  [facility];  Service: Urology    IL CYSTOURETHROSCOPY W/URETERAL CATHETERIZATION Right 03/30/2023    Procedure: CYSTOSCOPY RETROGRADE PYELOGRAM WITH exchange of STENT URETERAL;  Surgeon: Demetrius Lowe MD;  [facility];  Service: Urology    IL CYSTOURETHROSCOPY W/URETERAL CATHETERIZATION Bilateral 05/09/2023    Procedure: CYSTOSCOPY, BILATERAL RETROGRADE PYELOGRAM, WITH LEFT INSERTION STENT URETERAL, RIGHT URTERAL STENT EXCHANGE;  Surgeon: Nicola Hannah MD;  [facility];  Service: Urology    IL CYSTOURETHROSCOPY W/URETERAL CATHETERIZATION Bilateral 8/1/2023    Procedure:  CYSTOSCOPY BILATERAL RETROGRADE  WITH REMOVAL BILATERAL  STENT URETERAL;  Surgeon: Demetrius Lowe MD;  [facility];  Service: Urology    NM INSJ TUNNELED CTR VAD W/SUBQ PORT AGE 5 YR/> Left 11/12/2019    Procedure: INSERTION VENOUS PORT ( PORT-A-CATH) IR;  Surgeon: Edilberto Guzman DO;  [facility];  Service: Interventional Radiology    NM LAPS ABD PRTM&OMENTUM DX W/WO SPEC BR/WA SPX N/A 12/19/2017    Procedure: LAPAROSCOPY DIAGNOSTIC;  Surgeon: Evgeny So MD;  [facility];  Service: Gynecology Oncology    NM LAPS W/RAD HYST W/BILAT LMPHADEC RMVL TUBE/OVARY N/A 12/19/2017    Procedure: HYSTERECTOMY LAPAROSCOPIC TOTAL (LTH) W/ ROBOTICS; BILATERAL SALPINGOOPHERECTOMY; LYMPH NODE DISSECTION; lysis of adhesions;  Surgeon: Evgeny So MD;  [facility];  Service: Gynecology Oncology    REMOVAL URETERAL STENT Bilateral 8/1/2023    Procedure: REMOVAL STENT URETERAL;  Surgeon: Demetrius Lowe MD;  [facility];  Service: Urology    TONSILLECTOMY      US GUIDED BREAST BIOPSY RIGHT COMPLETE Right 06/28/2019       ALLERGIES:  Allergies   Allergen Reactions    Cephalosporins Rash     Which Cephalosporin reaction was to not specified; however, has tolerated Amoxicillin, Cefazolin, and Cefepime       SOCIAL HISTORY:  Social History     Substance and Sexual Activity   Alcohol Use Never     Social History     Substance and Sexual Activity   Drug Use Never     Social History     Tobacco Use   Smoking Status Never   Smokeless Tobacco Never       FAMILY HISTORY:  Family History   Problem Relation Age of Onset    Hyperlipidemia Mother     Heart disease Mother     Ovarian cancer Mother 50    Colon cancer Mother     Lymphoma Father     Breast cancer Sister 63    No Known Problems Brother     No Known Problems Brother     No Known Problems Maternal Grandmother     Bone cancer Maternal Grandfather     Uterine cancer Paternal Grandmother     No Known Problems Paternal  Grandfather     No Known Problems Maternal Aunt     No Known Problems Maternal Aunt     No Known Problems Maternal Aunt     No Known Problems Maternal Aunt     No Known Problems Paternal Aunt     No Known Problems Paternal Aunt     No Known Problems Paternal Aunt     No Known Problems Paternal Aunt        MEDICATIONS:    Current Facility-Administered Medications:     acetaminophen (TYLENOL) tablet 650 mg, 650 mg, Oral, Q6H PRN, Renard Villa Jr., DO, 650 mg at 05/07/25 1233    ARIPiprazole (ABILIFY) tablet 20 mg, 20 mg, Oral, HS, Renard Villa Jr., DO, 20 mg at 05/07/25 2125    calcium acetate (PHOSLO) capsule 1,334 mg, 1,334 mg, Oral, BID, Renard Villa Jr., DO, 1,334 mg at 05/07/25 1724    calcium carbonate (OYSTER SHELL,OSCAL) 500 mg tablet 1 tablet, 1 tablet, Oral, Daily With Breakfast, Renard Villa Jr., DO, 1 tablet at 05/07/25 0821    Cholecalciferol (VITAMIN D3) tablet 4,000 Units, 4,000 Units, Oral, Daily, Renard Villa Jr., DO, 4,000 Units at 05/07/25 0823    folic acid (FOLVITE) tablet 1 mg, 1 mg, Oral, Daily, Renard Villa Jr., DO, 1 mg at 05/07/25 0822    heparin (porcine) subcutaneous injection 5,000 Units, 5,000 Units, Subcutaneous, Q8H CHRISTIANO, 5,000 Units at 05/08/25 0450 **AND** [COMPLETED] Platelet count, , , Once, Renard Villa Jr., DO    multivitamin stress formula tablet 1 tablet, 1 tablet, Oral, QAM, Renard Villa Jr., DO, 1 tablet at 05/07/25 0822    [COMPLETED] piperacillin-tazobactam (ZOSYN) 4.5 g in sodium chloride 0.9 % 100 mL IV LOADING DOSE, 4.5 g, Intravenous, Once, Last Rate: 200 mL/hr at 05/07/25 1222, 4.5 g at 05/07/25 1222 **FOLLOWED BY** piperacillin-tazobactam (ZOSYN) 4.5 g in sodium chloride 0.9 % 100 mL IVPB (EXTENDED INFUSION), 4.5 g, Intravenous, Q12H, Dedra Gutierrez DO, Last Rate: 25 mL/hr at 05/08/25 0450, 4.5 g at 05/08/25 0450    senna-docusate sodium (SENOKOT S) 8.6-50  mg per tablet 1 tablet, 1 tablet, Oral, BID, Dedra Gutierrez DO    sodium bicarbonate 150 mEq in dextrose 5 % 1,000 mL infusion, 100 mL/hr, Intravenous, Continuous, Nathan Romero DO    venlafaxine (EFFEXOR-XR) 24 hr capsule 225 mg, 225 mg, Oral, Daily, Renard Smith Daisy Cifuentes, DO, 225 mg at 05/07/25 0823    Review of Systems   Constitutional:  Negative for chills and fever.   HENT:  Negative for ear pain and sore throat.    Eyes:  Negative for pain and visual disturbance.   Respiratory:  Negative for cough and shortness of breath.    Cardiovascular:  Negative for chest pain and palpitations.   Gastrointestinal:  Negative for abdominal pain and vomiting.   Genitourinary:  Negative for dysuria and hematuria.   Musculoskeletal:  Negative for arthralgias and back pain.   Skin:  Negative for color change and rash.   Neurological:  Negative for seizures and syncope.   All other systems reviewed and are negative.        PHYSICAL EXAM:  Current Weight: Weight - Scale: 71.9 kg (158 lb 8.2 oz)  First Weight: Weight - Scale: 71.9 kg (158 lb 8.2 oz)  Vitals:    05/07/25 0700 05/07/25 1500 05/07/25 2304 05/08/25 0700   BP: 117/62 117/55 127/62 130/61   BP Location: Right arm Right arm Right arm Right arm   Pulse: 81 70 79 80   Resp: 18 16 18 16   Temp: 99.2 °F (37.3 °C) 97.5 °F (36.4 °C) 98.7 °F (37.1 °C) 98.3 °F (36.8 °C)   TempSrc: Oral Oral Oral Oral   SpO2: 95% 99% 94% 95%   Weight:       Height:           Intake/Output Summary (Last 24 hours) at 5/8/2025 0751  Last data filed at 5/8/2025 0501  Gross per 24 hour   Intake 260 ml   Output 2375 ml   Net -2115 ml     Physical Exam  Constitutional:       Appearance: Normal appearance.   HENT:      Head: Normocephalic and atraumatic.   Cardiovascular:      Rate and Rhythm: Normal rate and regular rhythm.      Pulses: Normal pulses.      Heart sounds: Normal heart sounds.   Pulmonary:      Effort: Pulmonary effort is normal.      Breath sounds: Normal breath sounds.  "  Abdominal:      Comments: Ileal conduit present   Genitourinary:     Comments: Right PCN present  Musculoskeletal:         General: Normal range of motion.   Skin:     General: Skin is warm.   Neurological:      Mental Status: She is alert and oriented to person, place, and time. Mental status is at baseline.   Psychiatric:         Mood and Affect: Mood normal.       Lab Results:   Results from last 7 days   Lab Units 05/08/25  0450 05/07/25  0819 05/06/25  2248 05/06/25  1813   WBC Thousand/uL 16.39* 25.43*  --  23.35*   HEMOGLOBIN g/dL 9.8* 10.9*  --  12.3   HEMATOCRIT % 30.5* 33.7*  --  39.2   PLATELETS Thousands/uL 183 190 188 216   POTASSIUM mmol/L 3.9 4.2  --  4.2   CHLORIDE mmol/L 105 107  --  104   CO2 mmol/L 15* 16*  --  17*   BUN mg/dL 67* 56*  --  48*   CREATININE mg/dL 3.59* 3.17*  --  2.75*   CALCIUM mg/dL 8.2* 8.6  --  9.4   MAGNESIUM mg/dL 2.4 1.7*  --   --    ALK PHOS U/L  --   --   --  110*   ALT U/L  --   --   --  29   AST U/L  --   --   --  18       Portions of the record may have been created with voice recognition software. Occasional wrong word or \"sound a like\" substitutions may have occurred due to the inherent limitations of voice recognition software. Read the chart carefully and recognize, using context, where substitutions have occurred.If you have any questions, please contact the dictating provider.    "

## 2025-05-08 NOTE — PROGRESS NOTES
"Progress Note - Hospitalist   Name: Ragini Melendez 69 y.o. female I MRN: [id number]  Unit/Bed#: W -01 I Date of Admission: 5/6/2025   Date of Service: 5/8/2025 I Hospital Day: 1    Assessment & Plan  Sepsis (HCC)  69-year-old female past medical history endometrial cancer s/p resection with last dose of chemotherapy 2022, CKD with right-sided nephrostomy tube, left sided tunneled nephrostomy tube ileal conduit, and hypertension presented with sudden onset left flank pain that began 5/6/2025.  Patient reported that she had decreased urine output in her nephrostomy tubes per her usual.  States that urine in right kidney was significantly diluted; does note that right kidney is \"on its way out\" so does not typically produce a significant amount of urine.  Noted that urine from left kidney was dark in color.  Patient states that nephrostomy tube typically changed every 3 months.  Last changed March 2025.  Patient met SIRS criteria on admission due to white blood cell count of 23K and heart rate of 98 bpm.  Tmax 99.4.  Lactic acid within normal limits.  Blood pressure stable.  Blood cultures positive for 2 out of 2 gram-negative rods both from right arm draw.  Suspect bacteremia secondary to UTI/pyelonephritis   Patient has successful exchange of bilateral nephrostomy tubes.  Per IR, left nephrostomy tube was completely blocked due to stones.  Suspect likely contributing to her elevated creatinine.  CT abdomen and pelvis: Mild left-sided hydronephrosis with moderate perinephric inflammatory fat stranding left side.  Possible pancolitis  Patient denies any fever or chills.  She denies any abdominal pain, nausea, vomiting, or diarrhea.    Plan:  Started on zosyn 5/7 for 2/2 gram negative bacteremia. Will continue and adjust based on susceptibilities   Monitor nephrostomy tube drainage b/l  Continue to trend CBC and fever curve  WBC improved from 25 K to 16 K   PT/OT consulted.  Appreciate recommendations  Left flank " pain (Resolved: 5/8/2025)  Presenting with left-sided flank pain which began abruptly earlier in the day  No change in recent physical activity, no recent trauma, no association with diet  Given CT findings listed above suspect possible pyelonephritis, or left-sided nephrostomy tube malfunction  Pain currently controlled   Malfunction of nephrostomy tube (HCC) (Resolved: 5/8/2025)  Noted low urine output from urostomy draining from left-sided ileal conduit  No changes noted with the right sided nephrostomy tube  Patient has history of endometrial cancer for which she received chemotherapy with her last dose being in 2022, 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  IR noted complete blockage of left nephrostomy tube due to stones  Acute kidney injury (HCC)  Elevated serum creatinine in the setting of CKD  Creatinine on admission 2.75  Baseline appears to be 2.2-2.5  Most likely in the setting of possible nephrostomy tube obstruction  B/l nephrostomy tube exchange 5/7 competed by IR; noted complete blockage of L nephrostomy tube    Plan:  Monitor daily BMP  Cr increased overnight to 3.59 from 3.17 despite being on isolyte 100 ml/hr for 24 hours  CO2 decreased to 15.  Patient started on bicarb drip  Nephrology on board.  Appreciate recommendations.  Electrolyte abnormality  Mild hyponatremia at 134 on admission  Magnesium decreased at 1.7    Plan:  Encourage oral intake and hydration  Replete electrolytes as clinically indicated  High anion gap metabolic acidosis  Nephrology on board.  Patient started on bicarb drip  CKD (chronic kidney disease)  Lab Results   Component Value Date    EGFR 12 05/08/2025    EGFR 14 05/07/2025     EGFR 17 05/06/2025    CREATININE 3.59 (H) 05/08/2025    CREATININE 3.17 (H) 05/07/2025    CREATININE 2.75 (H) 05/06/2025   MONROE on CKD  Creatinine continuing to increase  from 3.17-3.59  Nephrology on board.  Appreciate recommendations    Gram-negative bacteremia  Gram-negative bacteremia in 2 out of 2 blood cultures  Patient started on Zosyn  Awaiting susceptibilities  See plan under sepsis    VTE Pharmacologic Prophylaxis: VTE Score: 9 High Risk (Score >/= 5) - Pharmacological DVT Prophylaxis Ordered: heparin. Sequential Compression Devices Ordered.    Mobility:   Basic Mobility Inpatient Raw Score: 22  JH-HLM Goal: 7: Walk 25 feet or more  JH-HLM Achieved: 3: Sit at edge of bed  JH-HLM Goal NOT achieved. Continue with multidisciplinary rounding and encourage appropriate mobility to improve upon -HLM goals.    Patient Centered Rounds: I performed bedside rounds with nursing staff today.   Discussions with Specialists or Other Care Team Provider: Nephrology    Education and Discussions with Family / Patient: Attempted to update  (sister) via phone. Unable to contact. No identifying info in VM    Current Length of Stay: 1 day(s)  Current Patient Status: Inpatient   Certification Statement: The patient will continue to require additional inpatient hospital stay due to MONROE and gram-negative bacteremia  Discharge Plan: Anticipate discharge in 24-48 hrs to discharge location to be determined pending rehab evaluations.    Code Status: Level 1 - Full Code    Subjective   No overnight events.  Patient states flank pain has significant improvement after IR procedure.  States that urine output is at her baseline.  Notes some constipation as she has not gone for 2 days and will start on bowel regimen.  Denies any other new symptomology.    Objective :  Temp:  [98.3 °F (36.8 °C)-98.7 °F (37.1 °C)] 98.3 °F (36.8 °C)  HR:  [79-80] 80  BP: (127-130)/(61-62) 130/61  Resp:  [16-18] 16  SpO2:  [94 %-95 %] 95 %  O2  Device: None (Room air)    Body mass index is 33.13 kg/m².     Input and Output Summary (last 24 hours):     Intake/Output Summary (Last 24 hours) at 5/8/2025 1553  Last data filed at 5/8/2025 1301  Gross per 24 hour   Intake 260 ml   Output 3600 ml   Net -3340 ml       Physical Exam  Vitals and nursing note reviewed.   Constitutional:       General: She is not in acute distress.     Appearance: She is well-developed.   HENT:      Head: Normocephalic and atraumatic.   Eyes:      Conjunctiva/sclera: Conjunctivae normal.   Cardiovascular:      Rate and Rhythm: Normal rate and regular rhythm.      Heart sounds: No murmur heard.  Pulmonary:      Effort: Pulmonary effort is normal. No respiratory distress.      Breath sounds: Normal breath sounds.   Abdominal:      Palpations: Abdomen is soft.      Tenderness: There is no abdominal tenderness. There is no right CVA tenderness or left CVA tenderness.      Comments: Has bilateral nephrostomy tubes with urine output   Musculoskeletal:         General: No swelling.      Cervical back: Neck supple.   Skin:     General: Skin is warm and dry.      Capillary Refill: Capillary refill takes less than 2 seconds.   Neurological:      Mental Status: She is alert.   Psychiatric:         Mood and Affect: Mood normal.         Lines/Drains:  Lines/Drains/Airways       Active Status       Name Placement date Placement time Site Days    Port A Cath 11/12/19 Left Chest 11/12/19  1221  Chest  2004    Nephrostomy Retrograde/ Ileal Conduit Left 102 Fr. 05/07/25  1124  Left  1    Nephrostomy Right 10.2 Fr. 05/07/25  1148  Right  1    Urostomy Ileal conduit RLQ 10/24/24  1158  RLQ  196                    Central Line:  Goal for removal: Will discontinue when meds requiring line are completed.               Lab Results: I have reviewed the following results:   Results from last 7 days   Lab Units 05/08/25  0450 05/07/25  0819   WBC Thousand/uL 16.39* 25.43*   HEMOGLOBIN g/dL 9.8* 10.9*    HEMATOCRIT % 30.5* 33.7*   PLATELETS Thousands/uL 183 190   BANDS PCT %  --  7   LYMPHO PCT %  --  2*   MONO PCT %  --  4   EOS PCT %  --  0     Results from last 7 days   Lab Units 05/08/25  0450 05/07/25  0819 05/06/25  1813   SODIUM mmol/L 134*   < > 134*   POTASSIUM mmol/L 3.9   < > 4.2   CHLORIDE mmol/L 105   < > 104   CO2 mmol/L 15*   < > 17*   BUN mg/dL 67*   < > 48*   CREATININE mg/dL 3.59*   < > 2.75*   ANION GAP mmol/L 14*   < > 13   CALCIUM mg/dL 8.2*   < > 9.4   ALBUMIN g/dL  --   --  4.2   TOTAL BILIRUBIN mg/dL  --   --  0.41   ALK PHOS U/L  --   --  110*   ALT U/L  --   --  29   AST U/L  --   --  18   GLUCOSE RANDOM mg/dL 84   < > 118    < > = values in this interval not displayed.     Results from last 7 days   Lab Units 05/06/25 2034   INR  0.98             Results from last 7 days   Lab Units 05/06/25 2034   LACTIC ACID mmol/L 1.2       Recent Cultures (last 7 days):   Results from last 7 days   Lab Units 05/07/25  1142 05/06/25  2106 05/06/25 2034   BLOOD CULTURE   --  Klebsiella pneumoniae* Klebsiella pneumoniae*   GRAM STAIN RESULT  3+ Polys*  2+ Gram positive cocci in chains and clusters*  1+ Gram negative rods* Gram negative rods* Gram negative rods*   BODY FLUID CULTURE, STERILE  Culture too young- will reincubate  --   --              Last 24 Hours Medication List:     Current Facility-Administered Medications:     acetaminophen (TYLENOL) tablet 650 mg, Q6H PRN    ARIPiprazole (ABILIFY) tablet 20 mg, HS    calcium acetate (PHOSLO) capsule 1,334 mg, BID    calcium carbonate (OYSTER SHELL,OSCAL) 500 mg tablet 1 tablet, Daily With Breakfast    Cholecalciferol (VITAMIN D3) tablet 4,000 Units, Daily    folic acid (FOLVITE) tablet 1 mg, Daily    heparin (porcine) subcutaneous injection 5,000 Units, Q8H CHRISTIANO **AND** [COMPLETED] Platelet count, Once    multivitamin stress formula tablet 1 tablet, QAM    [COMPLETED] piperacillin-tazobactam (ZOSYN) 4.5 g in sodium chloride 0.9 % 100 mL IV LOADING  DOSE, Once, Last Rate: Stopped (05/08/25 0339) **FOLLOWED BY** piperacillin-tazobactam (ZOSYN) 4.5 g in sodium chloride 0.9 % 100 mL IVPB (EXTENDED INFUSION), Q12H, Last Rate: 4.5 g (05/08/25 1450)    senna-docusate sodium (SENOKOT S) 8.6-50 mg per tablet 1 tablet, BID    sodium bicarbonate 150 mEq in dextrose 5 % 1,000 mL infusion, Continuous, Last Rate: 100 mL/hr (05/08/25 0950)    venlafaxine (EFFEXOR-XR) 24 hr capsule 225 mg, Daily    Administrative Statements   Today, Patient Was Seen By: Dedra Gutierrez DO      **Please Note: This note may have been constructed using a voice recognition system.**

## 2025-05-08 NOTE — ASSESSMENT & PLAN NOTE
- Sodium level stable at 134  - Continue to monitor with isotonic fluids   [>50% of Time Spent on Counseling and Coordination of Care for  ___] : Greater than 50% of the encounter time was spent on counseling and coordination of care for [unfilled] [Time Spent: ___ minutes] : I have spent [unfilled] minutes of face to face time with the patient

## 2025-05-09 PROBLEM — E87.29 HIGH ANION GAP METABOLIC ACIDOSIS: Status: RESOLVED | Noted: 2023-05-04 | Resolved: 2025-05-09

## 2025-05-09 LAB
ANION GAP SERPL CALCULATED.3IONS-SCNC: 12 MMOL/L (ref 4–13)
BACTERIA BLD CULT: ABNORMAL
BACTERIA BLD CULT: ABNORMAL
BUN SERPL-MCNC: 66 MG/DL (ref 5–25)
CALCIUM SERPL-MCNC: 8.2 MG/DL (ref 8.4–10.2)
CHLORIDE SERPL-SCNC: 102 MMOL/L (ref 96–108)
CO2 SERPL-SCNC: 26 MMOL/L (ref 21–32)
CREAT SERPL-MCNC: 3.48 MG/DL (ref 0.6–1.3)
ERYTHROCYTE [DISTWIDTH] IN BLOOD BY AUTOMATED COUNT: 13.2 % (ref 11.6–15.1)
GFR SERPL CREATININE-BSD FRML MDRD: 12 ML/MIN/1.73SQ M
GLUCOSE SERPL-MCNC: 129 MG/DL (ref 65–140)
GRAM STN SPEC: ABNORMAL
GRAM STN SPEC: ABNORMAL
HCT VFR BLD AUTO: 29.3 % (ref 34.8–46.1)
HGB BLD-MCNC: 9.5 G/DL (ref 11.5–15.4)
KLEBSIELLA SP DNA BLD POS QL NAA+NON-PRB: DETECTED
MCH RBC QN AUTO: 28.5 PG (ref 26.8–34.3)
MCHC RBC AUTO-ENTMCNC: 32.4 G/DL (ref 31.4–37.4)
MCV RBC AUTO: 88 FL (ref 82–98)
PLATELET # BLD AUTO: 196 THOUSANDS/UL (ref 149–390)
PMV BLD AUTO: 10 FL (ref 8.9–12.7)
POTASSIUM SERPL-SCNC: 3.1 MMOL/L (ref 3.5–5.3)
RBC # BLD AUTO: 3.33 MILLION/UL (ref 3.81–5.12)
SODIUM SERPL-SCNC: 140 MMOL/L (ref 135–147)
WBC # BLD AUTO: 11.12 THOUSAND/UL (ref 4.31–10.16)

## 2025-05-09 PROCEDURE — 85027 COMPLETE CBC AUTOMATED: CPT

## 2025-05-09 PROCEDURE — 99232 SBSQ HOSP IP/OBS MODERATE 35: CPT | Performed by: INTERNAL MEDICINE

## 2025-05-09 PROCEDURE — 80048 BASIC METABOLIC PNL TOTAL CA: CPT

## 2025-05-09 RX ORDER — SODIUM CHLORIDE, SODIUM GLUCONATE, SODIUM ACETATE, POTASSIUM CHLORIDE, MAGNESIUM CHLORIDE, SODIUM PHOSPHATE, DIBASIC, AND POTASSIUM PHOSPHATE .53; .5; .37; .037; .03; .012; .00082 G/100ML; G/100ML; G/100ML; G/100ML; G/100ML; G/100ML; G/100ML
75 INJECTION, SOLUTION INTRAVENOUS CONTINUOUS
Status: DISCONTINUED | OUTPATIENT
Start: 2025-05-09 | End: 2025-05-10

## 2025-05-09 RX ORDER — POTASSIUM CHLORIDE 1500 MG/1
40 TABLET, EXTENDED RELEASE ORAL 2 TIMES DAILY
Status: COMPLETED | OUTPATIENT
Start: 2025-05-09 | End: 2025-05-10

## 2025-05-09 RX ORDER — LIDOCAINE 50 MG/G
1 PATCH TOPICAL DAILY
Status: DISCONTINUED | OUTPATIENT
Start: 2025-05-09 | End: 2025-05-11 | Stop reason: HOSPADM

## 2025-05-09 RX ORDER — POTASSIUM CHLORIDE 1500 MG/1
40 TABLET, EXTENDED RELEASE ORAL ONCE
Status: DISCONTINUED | OUTPATIENT
Start: 2025-05-09 | End: 2025-05-09

## 2025-05-09 RX ORDER — AMOXICILLIN AND CLAVULANATE POTASSIUM 500; 125 MG/1; MG/1
1 TABLET, FILM COATED ORAL EVERY 12 HOURS SCHEDULED
Status: DISCONTINUED | OUTPATIENT
Start: 2025-05-09 | End: 2025-05-11 | Stop reason: HOSPADM

## 2025-05-09 RX ORDER — POLYETHYLENE GLYCOL 3350 17 G/17G
17 POWDER, FOR SOLUTION ORAL DAILY PRN
Status: DISCONTINUED | OUTPATIENT
Start: 2025-05-09 | End: 2025-05-11 | Stop reason: HOSPADM

## 2025-05-09 RX ORDER — AMOXICILLIN 250 MG
2 CAPSULE ORAL 2 TIMES DAILY
Status: DISCONTINUED | OUTPATIENT
Start: 2025-05-09 | End: 2025-05-10

## 2025-05-09 RX ADMIN — Medication 2.5 MG: at 01:58

## 2025-05-09 RX ADMIN — AMOXICILLIN AND CLAVULANATE POTASSIUM 1 TABLET: 500; 125 TABLET, FILM COATED ORAL at 17:20

## 2025-05-09 RX ADMIN — POTASSIUM CHLORIDE 40 MEQ: 1500 TABLET, EXTENDED RELEASE ORAL at 17:20

## 2025-05-09 RX ADMIN — HEPARIN SODIUM 5000 UNITS: 5000 INJECTION INTRAVENOUS; SUBCUTANEOUS at 13:56

## 2025-05-09 RX ADMIN — VENLAFAXINE HYDROCHLORIDE 225 MG: 150 CAPSULE, EXTENDED RELEASE ORAL at 08:30

## 2025-05-09 RX ADMIN — HEPARIN SODIUM 5000 UNITS: 5000 INJECTION INTRAVENOUS; SUBCUTANEOUS at 05:46

## 2025-05-09 RX ADMIN — SODIUM CHLORIDE, SODIUM GLUCONATE, SODIUM ACETATE, POTASSIUM CHLORIDE, MAGNESIUM CHLORIDE, SODIUM PHOSPHATE, DIBASIC, AND POTASSIUM PHOSPHATE 75 ML/HR: .53; .5; .37; .037; .03; .012; .00082 INJECTION, SOLUTION INTRAVENOUS at 22:02

## 2025-05-09 RX ADMIN — CALCIUM ACETATE 1334 MG: 667 CAPSULE ORAL at 17:20

## 2025-05-09 RX ADMIN — CALCIUM ACETATE 1334 MG: 667 CAPSULE ORAL at 08:30

## 2025-05-09 RX ADMIN — POTASSIUM CHLORIDE 40 MEQ: 1500 TABLET, EXTENDED RELEASE ORAL at 08:29

## 2025-05-09 RX ADMIN — SENNOSIDES AND DOCUSATE SODIUM 2 TABLET: 50; 8.6 TABLET ORAL at 08:30

## 2025-05-09 RX ADMIN — FOLIC ACID 1 MG: 1 TABLET ORAL at 08:30

## 2025-05-09 RX ADMIN — SODIUM CHLORIDE, SODIUM GLUCONATE, SODIUM ACETATE, POTASSIUM CHLORIDE, MAGNESIUM CHLORIDE, SODIUM PHOSPHATE, DIBASIC, AND POTASSIUM PHOSPHATE 75 ML/HR: .53; .5; .37; .037; .03; .012; .00082 INJECTION, SOLUTION INTRAVENOUS at 08:27

## 2025-05-09 RX ADMIN — ARIPIPRAZOLE 20 MG: 10 TABLET ORAL at 21:54

## 2025-05-09 RX ADMIN — B-COMPLEX W/ C & FOLIC ACID TAB 1 TABLET: TAB at 08:31

## 2025-05-09 RX ADMIN — ACETAMINOPHEN 650 MG: 325 TABLET, FILM COATED ORAL at 04:30

## 2025-05-09 RX ADMIN — Medication 4000 UNITS: at 08:30

## 2025-05-09 RX ADMIN — Medication 1 TABLET: at 08:30

## 2025-05-09 RX ADMIN — HEPARIN SODIUM 5000 UNITS: 5000 INJECTION INTRAVENOUS; SUBCUTANEOUS at 21:54

## 2025-05-09 RX ADMIN — PIPERACILLIN AND TAZOBACTAM 4.5 G: 36; 4.5 INJECTION, POWDER, LYOPHILIZED, FOR SOLUTION INTRAVENOUS at 05:46

## 2025-05-09 NOTE — ASSESSMENT & PLAN NOTE
"69-year-old female past medical history endometrial cancer s/p resection with last dose of chemotherapy 2022, CKD with right-sided nephrostomy tube, left sided tunneled nephrostomy tube ileal conduit, and hypertension presented with sudden onset left flank pain that began 5/6/2025.  Patient reported that she had decreased urine output in her nephrostomy tubes per her usual.  States that urine in right kidney was significantly diluted; does note that right kidney is \"on its way out\" so does not typically produce a significant amount of urine.  Noted that urine from left kidney was dark in color.  Patient states that nephrostomy tube typically changed every 3 months.  Last changed March 2025.  Patient met Sepsis criteria on admission due to white blood cell count of 23K and heart rate of 98 bpm.  Tmax 99.4.  Lactic acid within normal limits.  Blood pressure stable.  Blood cultures positive for 2 out of 2 gram-negative rods both from right arm draw.  Suspect bacteremia secondary to UTI/pyelonephritis   Patient has successful exchange of bilateral nephrostomy tubes.  Per IR, left nephrostomy tube was completely blocked due to stones.  Suspect likely contributing to her elevated creatinine.  CT abdomen and pelvis: Mild left-sided hydronephrosis with moderate perinephric inflammatory fat stranding left side.  Possible pancolitis  Patient denies any fever or chills.  She denies any abdominal pain, nausea, vomiting, or diarrhea.    Plan:  Will change from Zosyn to Augmentin 500 mg q 12 hours for 15 doses beginning 5/9 in the afternoon to complete a 10 day course of antibiotics for Klebsiella bacteremia based on culture susceptibilities    Monitor nephrostomy tube drainage b/l  Continue to trend CBC and fever curve  PT/OT consulted. No needs on discharge   "

## 2025-05-09 NOTE — PLAN OF CARE
Problem: Potential for Falls  Goal: Patient will remain free of falls  Description: INTERVENTIONS:- Educate patient/family on patient safety including physical limitations- Instruct patient to call for assistance with activity - Consult OT/PT to assist with strengthening/mobility - Keep Call bell within reach- Keep bed low and locked with side rails adjusted as appropriate- Keep care items and personal belongings within reach- Initiate and maintain comfort rounds- Make Fall Risk Sign visible to staff- Offer Toileting every 2 Hours, in advance of need- Initiate/Maintain bed alarm- Obtain necessary fall risk management equipment: yellow socks - Apply yellow socks and bracelet for high fall risk patients- Consider moving patient to room near nurses station  INTERVENTIONS:- Educate patient/family on patient safety including physical limitations- Instruct patient to call for assistance with activity - Consult OT/PT to assist with strengthening/mobility - Keep Call bell within reach- Keep bed low and locked with side rails adjusted as appropriate- Keep care items and personal belongings within reach- Initiate and maintain comfort rounds- Make Fall Risk Sign visible to staff- Offer Toileting every 2 Hours, in advance of need- Initiate/Maintain bed alarm- Obtain necessary fall risk management equipment: yellow socks - Apply yellow socks and bracelet for high fall risk patients- Consider moving patient to room near nurses station  Outcome: Progressing     Problem: Prexisting or High Potential for Compromised Skin Integrity  Goal: Skin integrity is maintained or improved  Description: INTERVENTIONS:- Identify patients at risk for skin breakdown- Assess and monitor skin integrity- Assess and monitor nutrition and hydration status- Monitor labs - Assess for incontinence - Turn and reposition patient- Assist with mobility/ambulation- Relieve pressure over bony prominences- Avoid friction and shearing- Provide appropriate  hygiene as needed including keeping skin clean and dry- Evaluate need for skin moisturizer/barrier cream- Collaborate with interdisciplinary team - Patient/family teaching- Consider wound care consult   Outcome: Progressing     Problem: PAIN - ADULT  Goal: Verbalizes/displays adequate comfort level or baseline comfort level  Description: Interventions:- Encourage patient to monitor pain and request assistance- Assess pain using appropriate pain scale- Administer analgesics based on type and severity of pain and evaluate response- Implement non-pharmacological measures as appropriate and evaluate response- Consider cultural and social influences on pain and pain management- Notify physician/advanced practitioner if interventions unsuccessful or patient reports new pain  Outcome: Progressing     Problem: INFECTION - ADULT  Goal: Absence or prevention of progression during hospitalization  Description: INTERVENTIONS:- Assess and monitor for signs and symptoms of infection- Monitor lab/diagnostic results- Monitor all insertion sites, i.e. indwelling lines, tubes, and drains- Monitor endotracheal if appropriate and nasal secretions for changes in amount and color- [place] appropriate cooling/warming therapies per order- Administer medications as ordered- Instruct and encourage patient and family to use good hand hygiene technique- Identify and instruct in appropriate isolation precautions for identified infection/condition  Outcome: Progressing  Goal: Absence of fever/infection during neutropenic period  Description: INTERVENTIONS:- Monitor WBC  Outcome: Progressing     Problem: SAFETY ADULT  Goal: Patient will remain free of falls  Description: INTERVENTIONS:- Educate patient/family on patient safety including physical limitations- Instruct patient to call for assistance with activity - Consult OT/PT to assist with strengthening/mobility - Keep Call bell within reach- Keep bed low and locked with side rails adjusted  as appropriate- Keep care items and personal belongings within reach- Initiate and maintain comfort rounds- Make Fall Risk Sign visible to staff- Offer Toileting every 2 Hours, in advance of need- Initiate/Maintain bed alarm- Obtain necessary fall risk management equipment: yellow socks- Apply yellow socks and bracelet for high fall risk patients- Consider moving patient to room near nurses station  INTERVENTIONS:- Educate patient/family on patient safety including physical limitations- Instruct patient to call for assistance with activity - Consult OT/PT to assist with strengthening/mobility - Keep Call bell within reach- Keep bed low and locked with side rails adjusted as appropriate- Keep care items and personal belongings within reach- Initiate and maintain comfort rounds- Make Fall Risk Sign visible to staff- Offer Toileting every 2 Hours, in advance of need- Initiate/Maintain bed alarm- Obtain necessary fall risk management equipment: yellow socks - Apply yellow socks and bracelet for high fall risk patients- Consider moving patient to room near nurses station  Outcome: Progressing  Goal: Maintain or return to baseline ADL function  Description: INTERVENTIONS:-  Assess patient's ability to carry out ADLs; assess patient's baseline for ADL function and identify physical deficits which impact ability to perform ADLs (bathing, care of mouth/teeth, toileting, grooming, dressing, etc.)- Assess/evaluate cause of self-care deficits - Assess range of motion- Assess patient's mobility; develop plan if impaired- Assess patient's need for assistive devices and provide as appropriate- Encourage maximum independence but intervene and supervise when necessary- Involve family in performance of ADLs- Assess for home care needs following discharge - Consider OT consult to assist with ADL evaluation and planning for discharge- Provide patient education as appropriate  Outcome: Progressing     Problem: DISCHARGE PLANNING  Goal:  Discharge to home or other facility with appropriate resources  Description: INTERVENTIONS:- Identify barriers to discharge w/patient and caregiver- Arrange for needed discharge resources and transportation as appropriate- Identify discharge learning needs (meds, wound care, etc.)- Arrange for interpretive services to assist at discharge as needed- Refer to Case Management Department for coordinating discharge planning if the patient needs post-hospital services based on physician/advanced practitioner order or complex needs related to functional status, cognitive ability, or social support system  Outcome: Progressing

## 2025-05-09 NOTE — ASSESSMENT & PLAN NOTE
Lab Results   Component Value Date    EGFR 12 05/09/2025    EGFR 12 05/08/2025    EGFR 14 05/07/2025    CREATININE 3.48 (H) 05/09/2025    CREATININE 3.59 (H) 05/08/2025    CREATININE 3.17 (H) 05/07/2025   MONROE on CKD  Creatinine downtrending from 3.59 to 3.48  Nephrology on board.  Appreciate recommendations

## 2025-05-09 NOTE — PLAN OF CARE
Problem: Potential for Falls  Goal: Patient will remain free of falls  Description: INTERVENTIONS:- Educate patient/family on patient safety including physical limitations- Instruct patient to call for assistance with activity - Consult OT/PT to assist with strengthening/mobility - Keep Call bell within reach- Keep bed low and locked with side rails adjusted as appropriate- Keep care items and personal belongings within reach- Initiate and maintain comfort rounds- Make Fall Risk Sign visible to staff- Offer Toileting every  Hours, in advance of need- Initiate/Maintain alarm- Obtain necessary fall risk management equipment: - Apply yellow socks and bracelet for high fall risk patients- Consider moving patient to room near nurses station  INTERVENTIONS:- Educate patient/family on patient safety including physical limitations- Instruct patient to call for assistance with activity - Consult OT/PT to assist with strengthening/mobility - Keep Call bell within reach- Keep bed low and locked with side rails adjusted as appropriate- Keep care items and personal belongings within reach- Initiate and maintain comfort rounds- Make Fall Risk Sign visible to staff- Offer Toileting every  Hours, in advance of need- Initiate/Maintain alarm- Obtain necessary fall risk management equipment: - Apply yellow socks and bracelet for high fall risk patients- Consider moving patient to room near nurses station  Outcome: Progressing

## 2025-05-09 NOTE — PROGRESS NOTES
NEPHROLOGY HOSPITAL PROGRESS NOTE   Ragini Melendez 69 y.o. female MRN: [id number]  Unit/Bed#: W -01 Encounter: [id number]  Reason for Consult: MONROE on CKD  Assessment & Plan  Acute kidney injury (HCC)  Etiology: Obstructive uropathy +/- ATN from complicated urinary infection leading to bacteremia  Baseline creatinine 2.0-2.5  Creatinine on admission 2.75 on 05/06  Creatinine on consultation 3.59 on 05/08  Creatinine today 3.48, trend is towards recovery  UA: 1+ protein, 20-30 RBC, innumerable bacteria  CT abdomen: Ileal conduit, left ureteral stent extends from left renal hilum through conduit and stoma, mild left hydronephrosis without hydroureter, moderate left perinephric inflammatory fat stranding, finding representing collecting system obstruction +/- UTIs  Status post IR procedure 05/07:   Left PCNU was completely occluded with stony debris, catheter was transected and cloudy urine immediately began draining consistent with occlusion at the hub  Contrast injection via transected catheter demonstrated patent catheter in good position,   Uncomplicated left retrograde PCNU exchange,   Right PCN was exchanged  Blood cultures growing Klebsiella  PCN cultures: Polymicrobial growth  Current Rx: IV fluids with sodium bicarbonate at 100 cc/h  Changes: Transition to Isolyte at 75 cc/h  Watch out for postobstructive diuresis  I expect serum creatinine to continue to improve  No indication for renal replacement therapy  Trend BMP  Sepsis (HCC)  - Management per primary team  Electrolyte abnormality  - Sodium level 140 today  - Continue to monitor with isotonic fluids  - If sodium level rises further, will need to switch to hypotonic fluids  CKD (chronic kidney disease)  - Etiology/risk factors: Hypertension, obstructive uropathy in setting of bilateral ureteral strictures, underlying glomerular pathology, age-related nephron loss   - Baseline Cr: 0.9-1.1 until 2022, then had several episodes of MONROE due to  obstructive uropathy in setting of bilateral distal ureteral strictures and currently in the range of 2.0-2.5  High anion gap metabolic acidosis  - She has history of known anion gap metabolic acidosis due to presence of ileal conduit  - She had anion gap metabolic acidosis in setting of MONROE which is currently resolved  - Serum bicarbonate level today 26  - Discontinue sodium bicarbonate drip  Gram-negative bacteremia  - Management per primary team    I have reviewed the nephrology recommendations including transitioning IV fluids from bicarbonate drip to Isolyte and that creatinine has peaked and trend is towards recovery, with internal medicine team, and we are in agreement with renal plan including the information outlined above.    SUBJECTIVE / 24H INTERVAL HISTORY:  Urine output recorded as 3000 cc.  Had some pain in left flank last night.  Otherwise feeling well.  Denies dyspnea.  Denies leg swelling.    OBJECTIVE:  Current Weight: Weight - Scale: 71.9 kg (158 lb 8.2 oz)  Vitals:    05/09/25 0100 05/09/25 0158 05/09/25 0300 05/09/25 0742   BP:    119/69   BP Location:       Pulse: 72 69 78 74   Resp:    19   Temp:    97.6 °F (36.4 °C)   TempSrc:       SpO2: 94% 93% 93% 94%   Weight:       Height:           Intake/Output Summary (Last 24 hours) at 5/9/2025 0801  Last data filed at 5/9/2025 0734  Gross per 24 hour   Intake 1605 ml   Output 3125 ml   Net -1520 ml     Review of Systems   Constitutional:  Negative for chills and fever.   HENT:  Negative for ear pain and sore throat.    Eyes:  Negative for pain and visual disturbance.   Respiratory:  Negative for cough and shortness of breath.    Cardiovascular:  Negative for chest pain and palpitations.   Gastrointestinal:  Negative for abdominal pain and vomiting.   Genitourinary:  Negative for dysuria and hematuria.   Musculoskeletal:  Negative for arthralgias and back pain.   Skin:  Negative for color change and rash.   Neurological:  Negative for seizures and  syncope.   All other systems reviewed and are negative.    Physical Exam  Vitals and nursing note reviewed.   Constitutional:       General: She is not in acute distress.     Appearance: She is well-developed.   HENT:      Head: Normocephalic and atraumatic.   Eyes:      Conjunctiva/sclera: Conjunctivae normal.   Cardiovascular:      Rate and Rhythm: Normal rate and regular rhythm.      Pulses: Normal pulses.      Heart sounds: Normal heart sounds. No murmur heard.  Pulmonary:      Effort: Pulmonary effort is normal. No respiratory distress.      Breath sounds: Normal breath sounds.   Abdominal:      Palpations: Abdomen is soft.      Tenderness: There is no abdominal tenderness.      Comments: Ileal conduit present with nephroureteral stent   Genitourinary:     Comments: PCN present with clear urine  Musculoskeletal:         General: No swelling.      Cervical back: Neck supple.      Right lower leg: No edema.      Left lower leg: No edema.   Skin:     General: Skin is warm and dry.      Capillary Refill: Capillary refill takes less than 2 seconds.   Neurological:      Mental Status: She is alert.   Psychiatric:         Mood and Affect: Mood normal.       Medications:    Current Facility-Administered Medications:     acetaminophen (TYLENOL) tablet 650 mg, 650 mg, Oral, Q6H PRN, Renard Villa Jr., DO, 650 mg at 05/09/25 0430    ARIPiprazole (ABILIFY) tablet 20 mg, 20 mg, Oral, HS, Renard Villa Jr., DO, 20 mg at 05/08/25 2114    calcium acetate (PHOSLO) capsule 1,334 mg, 1,334 mg, Oral, BID, Renard Villa Jr. DO, 1,334 mg at 05/08/25 1735    calcium carbonate (OYSTER SHELL,OSCAL) 500 mg tablet 1 tablet, 1 tablet, Oral, Daily With Breakfast, Renard Villa Jr., DO, 1 tablet at 05/08/25 0953    Cholecalciferol (VITAMIN D3) tablet 4,000 Units, 4,000 Units, Oral, Daily, Renard Villa Jr. DO, 4,000 Units at 05/08/25 0950    folic acid (FOLVITE) tablet 1 mg,  1 mg, Oral, Daily, Renard Villa Jr., DO, 1 mg at 05/08/25 0950    heparin (porcine) subcutaneous injection 5,000 Units, 5,000 Units, Subcutaneous, Q8H CHRISTIANO, 5,000 Units at 05/09/25 0546 **AND** [COMPLETED] Platelet count, , , Once, Renard Villa Jr., DO    lidocaine (LIDODERM) 5 % patch 1 patch, 1 patch, Topical, Daily, Dedra Gutierrez DO    multi-electrolyte (Plasmalyte-A/Isolyte-S PH 7.4/Normosol-R) IV solution, 75 mL/hr, Intravenous, Continuous, Wade Villalobos MD    multivitamin stress formula tablet 1 tablet, 1 tablet, Oral, QAM, Renard Villa Jr., DO, 1 tablet at 05/08/25 0950    [COMPLETED] piperacillin-tazobactam (ZOSYN) 4.5 g in sodium chloride 0.9 % 100 mL IV LOADING DOSE, 4.5 g, Intravenous, Once, Stopped at 05/08/25 1509 **FOLLOWED BY** piperacillin-tazobactam (ZOSYN) 4.5 g in sodium chloride 0.9 % 100 mL IVPB (EXTENDED INFUSION), 4.5 g, Intravenous, Q12H, Dedra Gutierrez DO, Last Rate: 25 mL/hr at 05/09/25 0546, 4.5 g at 05/09/25 0546    potassium chloride (Klor-Con M20) CR tablet 40 mEq, 40 mEq, Oral, BID, Wade Villalobos MD    senna-docusate sodium (SENOKOT S) 8.6-50 mg per tablet 2 tablet, 2 tablet, Oral, BID, Dedra Gutierrez DO    venlafaxine (EFFEXOR-XR) 24 hr capsule 225 mg, 225 mg, Oral, Daily, Renard Villa Jr., DO, 225 mg at 05/08/25 0953    Laboratory Results:  Results from last 7 days   Lab Units 05/09/25  0539 05/08/25  0450 05/07/25  0819 05/06/25  2248 05/06/25  1813   WBC Thousand/uL 11.12* 16.39* 25.43*  --  23.35*   HEMOGLOBIN g/dL 9.5* 9.8* 10.9*  --  12.3   HEMATOCRIT % 29.3* 30.5* 33.7*  --  39.2   PLATELETS Thousands/uL 196 183 190 188 216   POTASSIUM mmol/L 3.1* 3.9 4.2  --  4.2   CHLORIDE mmol/L 102 105 107  --  104   CO2 mmol/L 26 15* 16*  --  17*   BUN mg/dL 66* 67* 56*  --  48*   CREATININE mg/dL 3.48* 3.59* 3.17*  --  2.75*   CALCIUM mg/dL 8.2* 8.2* 8.6  --  9.4   MAGNESIUM mg/dL  --  2.4 1.7*  --   --        Portions  "of the record may have been created with voice recognition software. Occasional wrong word or \"sound a like\" substitutions may have occurred due to the inherent limitations of voice recognition software. Read the chart carefully and recognize, using context, where substitutions have occurred.If you have any questions, please contact the dictating provider.    "

## 2025-05-09 NOTE — ASSESSMENT & PLAN NOTE
Mild hyponatremia at 134 on admission  Magnesium decreased at 1.7    Plan:  Encourage oral intake and hydration  Replete electrolytes as clinically indicated  Potassium decreased at 3.1 on 5/9, started on 3 doses of Kcl 40 meq per nephrology

## 2025-05-09 NOTE — ASSESSMENT & PLAN NOTE
Etiology: Obstructive uropathy +/- ATN from complicated urinary infection leading to bacteremia  Baseline creatinine 2.0-2.5  Creatinine on admission 2.75 on 05/06  Creatinine on consultation 3.59 on 05/08  Creatinine today 3.48, trend is towards recovery  UA: 1+ protein, 20-30 RBC, innumerable bacteria  CT abdomen: Ileal conduit, left ureteral stent extends from left renal hilum through conduit and stoma, mild left hydronephrosis without hydroureter, moderate left perinephric inflammatory fat stranding, finding representing collecting system obstruction +/- UTIs  Status post IR procedure 05/07:   Left PCNU was completely occluded with stony debris, catheter was transected and cloudy urine immediately began draining consistent with occlusion at the hub  Contrast injection via transected catheter demonstrated patent catheter in good position,   Uncomplicated left retrograde PCNU exchange,   Right PCN was exchanged  Blood cultures growing Klebsiella  PCN cultures: Polymicrobial growth  Current Rx: IV fluids with sodium bicarbonate at 100 cc/h  Changes: Transition to Isolyte at 75 cc/h  Watch out for postobstructive diuresis  I expect serum creatinine to continue to improve  No indication for renal replacement therapy  Trend BMP

## 2025-05-09 NOTE — PROGRESS NOTES
"Progress Note - Hospitalist   Name: Ragini Melendez 69 y.o. female I MRN: [id number]  Unit/Bed#: W -01 I Date of Admission: 5/6/2025   Date of Service: 5/9/2025 I Hospital Day: 2    Assessment & Plan  Sepsis (HCC)  69-year-old female past medical history endometrial cancer s/p resection with last dose of chemotherapy 2022, CKD with right-sided nephrostomy tube, left sided tunneled nephrostomy tube ileal conduit, and hypertension presented with sudden onset left flank pain that began 5/6/2025.  Patient reported that she had decreased urine output in her nephrostomy tubes per her usual.  States that urine in right kidney was significantly diluted; does note that right kidney is \"on its way out\" so does not typically produce a significant amount of urine.  Noted that urine from left kidney was dark in color.  Patient states that nephrostomy tube typically changed every 3 months.  Last changed March 2025.  Patient met Sepsis criteria on admission due to white blood cell count of 23K and heart rate of 98 bpm.  Tmax 99.4.  Lactic acid within normal limits.  Blood pressure stable.  Blood cultures positive for 2 out of 2 gram-negative rods both from right arm draw.  Suspect bacteremia secondary to UTI/pyelonephritis   Patient has successful exchange of bilateral nephrostomy tubes.  Per IR, left nephrostomy tube was completely blocked due to stones.  Suspect likely contributing to her elevated creatinine.  CT abdomen and pelvis: Mild left-sided hydronephrosis with moderate perinephric inflammatory fat stranding left side.  Possible pancolitis  Patient denies any fever or chills.  She denies any abdominal pain, nausea, vomiting, or diarrhea.    Plan:  Will change from Zosyn to Augmentin 500 mg q 12 hours for 15 doses beginning 5/9 in the afternoon to complete a 10 day course of antibiotics for Klebsiella bacteremia based on culture susceptibilities    Monitor nephrostomy tube drainage b/l  Continue to trend CBC and " fever curve  PT/OT consulted. No needs on discharge   Acute kidney injury (HCC)  Elevated serum creatinine in the setting of CKD  Creatinine on admission 2.75  Baseline appears to be 2.2-2.5  Most likely in the setting of possible nephrostomy tube obstruction  B/l nephrostomy tube exchange 5/7 competed by IR; noted complete blockage of L nephrostomy tube    Plan:  Monitor daily BMP  CO2 improved on bicarb gtt from 5/8 to 5/9. Patient started on isolyte for MONROE  Nephrology on board.  Appreciate recommendations.  Per nephrology, as long as creatinine continues to downtrend tomorrow 5/10/2025, patient likely to be okay for discharge from their standpoint  Electrolyte abnormality  Mild hyponatremia at 134 on admission  Magnesium decreased at 1.7    Plan:  Encourage oral intake and hydration  Replete electrolytes as clinically indicated  Potassium decreased at 3.1 on 5/9, started on 3 doses of Kcl 40 meq per nephrology   High anion gap metabolic acidosis (Resolved: 5/9/2025)  Resolved s/p bicarb gtt 5/8  CKD (chronic kidney disease)  Lab Results   Component Value Date    EGFR 12 05/09/2025    EGFR 12 05/08/2025    EGFR 14 05/07/2025    CREATININE 3.48 (H) 05/09/2025    CREATININE 3.59 (H) 05/08/2025    CREATININE 3.17 (H) 05/07/2025   MONROE on CKD  Creatinine downtrending from 3.59 to 3.48  Nephrology on board.  Appreciate recommendations    Gram-negative bacteremia  Gram-negative bacteremia in 2 out of 2 blood cultures; positive for Klebsiella  De-escalated antibiotics from Zosyn to Augmentin based on culture sensitivities; will complete 10 day total course until 5/15/2025  See plan under sepsis    VTE Pharmacologic Prophylaxis: VTE Score: 9 High Risk (Score >/= 5) - Pharmacological DVT Prophylaxis Ordered: heparin. Sequential Compression Devices Ordered.    Mobility:   Basic Mobility Inpatient Raw Score: 19  JH-HLM Goal: 6: Walk 10 steps or more  JH-HLM Achieved: 3: Sit at edge of bed  JH-HLM Goal NOT achieved. Continue  with multidisciplinary rounding and encourage appropriate mobility to improve upon [facility] goals.    Patient Centered Rounds: I performed bedside rounds with nursing staff today.   Discussions with Specialists or Other Care Team Provider: nephrology     Education and Discussions with Family / Patient: Updated  (sister) via phone.    Current Length of Stay: 2 day(s)  Current Patient Status: Inpatient   Certification Statement: The patient will continue to require additional inpatient hospital stay due to MONROE and change in abx for bacteremia  Discharge Plan: Anticipate discharge tomorrow to home.    Code Status: Level 1 - Full Code    Subjective   Overnight, patient does note that she had some L flank pain around 4 am. Patient received oxycodone 2.5 mg from the night team with improvement in symptoms. On evaluation this am, patient stated pain was more over L buttock region and was agreeable to lidocaine patch. Patient states her mom typically uses lidocaine patch and that they usually work well for her.     Objective :  Temp:  [97.6 °F (36.4 °C)-98.4 °F (36.9 °C)] 97.6 °F (36.4 °C)  HR:  [69-79] 74  BP: (108-121)/(66-69) 119/69  Resp:  [19] 19  SpO2:  [93 %-96 %] 95 %  O2 Device: None (Room air)    Body mass index is 33.13 kg/m².     Input and Output Summary (last 24 hours):     Intake/Output Summary (Last 24 hours) at 5/9/2025 1257  Last data filed at 5/9/2025 1032  Gross per 24 hour   Intake 1845 ml   Output 2700 ml   Net -855 ml       Physical Exam  Vitals and nursing note reviewed.   Constitutional:       General: She is not in acute distress.     Appearance: She is well-developed.   HENT:      Head: Normocephalic and atraumatic.   Eyes:      Conjunctiva/sclera: Conjunctivae normal.   Cardiovascular:      Rate and Rhythm: Normal rate and regular rhythm.      Heart sounds: No murmur heard.  Pulmonary:      Effort: Pulmonary effort is normal. No respiratory distress.   Abdominal:      Palpations:  Abdomen is soft.      Tenderness: There is no abdominal tenderness.      Comments: L buttock tenderness; laurel's punch negative  B/l nephrostomy tubes in place   Musculoskeletal:         General: No swelling.      Cervical back: Neck supple.   Skin:     General: Skin is warm and dry.      Capillary Refill: Capillary refill takes less than 2 seconds.      Findings: Lesion present.      Comments: S/p b/l nephrostomy tubes placed   Neurological:      Mental Status: She is alert.   Psychiatric:         Mood and Affect: Mood normal.         Lines/Drains:  Lines/Drains/Airways       Active Status       Name Placement date Placement time Site Days    Port A Cath 11/12/19 Left Chest 11/12/19  1221  Chest  2004    Nephrostomy Retrograde/ Ileal Conduit Left 102 Fr. 05/07/25  1124  Left  2    Nephrostomy Right 10.2 Fr. 05/07/25  1148  Right  2    Urostomy Ileal conduit RLQ 10/24/24  1158  RLQ  197                    Central Line:  Goal for removal: Will discontinue when meds requiring line are completed.               Lab Results: I have reviewed the following results:   Results from last 7 days   Lab Units 05/09/25  0539 05/08/25  0450 05/07/25  0819   WBC Thousand/uL 11.12*   < > 25.43*   HEMOGLOBIN g/dL 9.5*   < > 10.9*   HEMATOCRIT % 29.3*   < > 33.7*   PLATELETS Thousands/uL 196   < > 190   BANDS PCT %  --   --  7   LYMPHO PCT %  --   --  2*   MONO PCT %  --   --  4   EOS PCT %  --   --  0    < > = values in this interval not displayed.     Results from last 7 days   Lab Units 05/09/25  0539 05/07/25  0819 05/06/25  1813   SODIUM mmol/L 140   < > 134*   POTASSIUM mmol/L 3.1*   < > 4.2   CHLORIDE mmol/L 102   < > 104   CO2 mmol/L 26   < > 17*   BUN mg/dL 66*   < > 48*   CREATININE mg/dL 3.48*   < > 2.75*   ANION GAP mmol/L 12   < > 13   CALCIUM mg/dL 8.2*   < > 9.4   ALBUMIN g/dL  --   --  4.2   TOTAL BILIRUBIN mg/dL  --   --  0.41   ALK PHOS U/L  --   --  110*   ALT U/L  --   --  29   AST U/L  --   --  18   GLUCOSE RANDOM  mg/dL 129   < > 118    < > = values in this interval not displayed.     Results from last 7 days   Lab Units 05/06/25 2034   INR  0.98             Results from last 7 days   Lab Units 05/06/25 2034   LACTIC ACID mmol/L 1.2       Recent Cultures (last 7 days):   Results from last 7 days   Lab Units 05/07/25  1142 05/06/25  2106 05/06/25 2034   BLOOD CULTURE   --  Klebsiella pneumoniae* Klebsiella pneumoniae*   GRAM STAIN RESULT  3+ Polys*  2+ Gram positive cocci in chains and clusters*  1+ Gram negative rods* Gram negative rods* Gram negative rods*   BODY FLUID CULTURE, STERILE  1+ Growth of Klebsiella pneumoniae*  2+ Growth of  --   --              Last 24 Hours Medication List:     Current Facility-Administered Medications:     acetaminophen (TYLENOL) tablet 650 mg, Q6H PRN    amoxicillin-clavulanate (AUGMENTIN) 500-125 mg per tablet 1 tablet, Q12H CHRISTIANO    ARIPiprazole (ABILIFY) tablet 20 mg, HS    calcium acetate (PHOSLO) capsule 1,334 mg, BID    calcium carbonate (OYSTER SHELL,OSCAL) 500 mg tablet 1 tablet, Daily With Breakfast    Cholecalciferol (VITAMIN D3) tablet 4,000 Units, Daily    folic acid (FOLVITE) tablet 1 mg, Daily    heparin (porcine) subcutaneous injection 5,000 Units, Q8H CHRISTIANO **AND** [COMPLETED] Platelet count, Once    lidocaine (LIDODERM) 5 % patch 1 patch, Daily    multi-electrolyte (Plasmalyte-A/Isolyte-S PH 7.4/Normosol-R) IV solution, Continuous, Last Rate: 75 mL/hr (05/09/25 0827)    multivitamin stress formula tablet 1 tablet, QAM    polyethylene glycol (MIRALAX) packet 17 g, Daily PRN    potassium chloride (Klor-Con M20) CR tablet 40 mEq, BID    senna-docusate sodium (SENOKOT S) 8.6-50 mg per tablet 2 tablet, BID    venlafaxine (EFFEXOR-XR) 24 hr capsule 225 mg, Daily    Administrative Statements   Today, Patient Was Seen By: Dedra Gutierrez DO      **Please Note: This note may have been constructed using a voice recognition system.**

## 2025-05-09 NOTE — ASSESSMENT & PLAN NOTE
- Sodium level 140 today  - Continue to monitor with isotonic fluids  - If sodium level rises further, will need to switch to hypotonic fluids

## 2025-05-09 NOTE — ASSESSMENT & PLAN NOTE
- She has history of known anion gap metabolic acidosis due to presence of ileal conduit  - She had anion gap metabolic acidosis in setting of MONROE which is currently resolved  - Serum bicarbonate level today 26  - Discontinue sodium bicarbonate drip

## 2025-05-09 NOTE — ASSESSMENT & PLAN NOTE
Elevated serum creatinine in the setting of CKD  Creatinine on admission 2.75  Baseline appears to be 2.2-2.5  Most likely in the setting of possible nephrostomy tube obstruction  B/l nephrostomy tube exchange 5/7 competed by IR; noted complete blockage of L nephrostomy tube    Plan:  Monitor daily BMP  CO2 improved on bicarb gtt from 5/8 to 5/9. Patient started on isolyte for MONROE  Nephrology on board.  Appreciate recommendations.  Per nephrology, as long as creatinine continues to downtrend tomorrow 5/10/2025, patient likely to be okay for discharge from their standpoint

## 2025-05-10 PROBLEM — E87.8 ELECTROLYTE ABNORMALITY: Status: RESOLVED | Noted: 2019-09-19 | Resolved: 2025-05-10

## 2025-05-10 LAB
ANION GAP SERPL CALCULATED.3IONS-SCNC: 10 MMOL/L (ref 4–13)
BACTERIA SPEC ANAEROBE CULT: ABNORMAL
BACTERIA SPEC BFLD CULT: ABNORMAL
BACTERIA SPEC BFLD CULT: ABNORMAL
BUN SERPL-MCNC: 52 MG/DL (ref 5–25)
CALCIUM SERPL-MCNC: 8.3 MG/DL (ref 8.4–10.2)
CHLORIDE SERPL-SCNC: 106 MMOL/L (ref 96–108)
CO2 SERPL-SCNC: 25 MMOL/L (ref 21–32)
CREAT SERPL-MCNC: 3.38 MG/DL (ref 0.6–1.3)
ERYTHROCYTE [DISTWIDTH] IN BLOOD BY AUTOMATED COUNT: 13.3 % (ref 11.6–15.1)
GFR SERPL CREATININE-BSD FRML MDRD: 13 ML/MIN/1.73SQ M
GLUCOSE SERPL-MCNC: 94 MG/DL (ref 65–140)
GRAM STN SPEC: ABNORMAL
HCT VFR BLD AUTO: 29.9 % (ref 34.8–46.1)
HGB BLD-MCNC: 9.4 G/DL (ref 11.5–15.4)
MAGNESIUM SERPL-MCNC: 2.2 MG/DL (ref 1.9–2.7)
MCH RBC QN AUTO: 28.7 PG (ref 26.8–34.3)
MCHC RBC AUTO-ENTMCNC: 31.4 G/DL (ref 31.4–37.4)
MCV RBC AUTO: 91 FL (ref 82–98)
PLATELET # BLD AUTO: 211 THOUSANDS/UL (ref 149–390)
PMV BLD AUTO: 10 FL (ref 8.9–12.7)
POTASSIUM SERPL-SCNC: 4 MMOL/L (ref 3.5–5.3)
RBC # BLD AUTO: 3.27 MILLION/UL (ref 3.81–5.12)
SODIUM SERPL-SCNC: 141 MMOL/L (ref 135–147)
WBC # BLD AUTO: 8.25 THOUSAND/UL (ref 4.31–10.16)

## 2025-05-10 PROCEDURE — 99232 SBSQ HOSP IP/OBS MODERATE 35: CPT | Performed by: INTERNAL MEDICINE

## 2025-05-10 PROCEDURE — 85027 COMPLETE CBC AUTOMATED: CPT

## 2025-05-10 PROCEDURE — 83735 ASSAY OF MAGNESIUM: CPT | Performed by: INTERNAL MEDICINE

## 2025-05-10 PROCEDURE — 80048 BASIC METABOLIC PNL TOTAL CA: CPT

## 2025-05-10 RX ORDER — SODIUM CHLORIDE 450 MG/100ML
75 INJECTION, SOLUTION INTRAVENOUS CONTINUOUS
Status: DISCONTINUED | OUTPATIENT
Start: 2025-05-10 | End: 2025-05-11

## 2025-05-10 RX ADMIN — HEPARIN SODIUM 5000 UNITS: 5000 INJECTION INTRAVENOUS; SUBCUTANEOUS at 21:14

## 2025-05-10 RX ADMIN — CALCIUM ACETATE 1334 MG: 667 CAPSULE ORAL at 17:35

## 2025-05-10 RX ADMIN — POTASSIUM CHLORIDE 40 MEQ: 1500 TABLET, EXTENDED RELEASE ORAL at 09:47

## 2025-05-10 RX ADMIN — Medication 1 TABLET: at 09:56

## 2025-05-10 RX ADMIN — CALCIUM ACETATE 1334 MG: 667 CAPSULE ORAL at 09:46

## 2025-05-10 RX ADMIN — ARIPIPRAZOLE 20 MG: 10 TABLET ORAL at 21:14

## 2025-05-10 RX ADMIN — AMOXICILLIN AND CLAVULANATE POTASSIUM 1 TABLET: 500; 125 TABLET, FILM COATED ORAL at 09:47

## 2025-05-10 RX ADMIN — AMOXICILLIN AND CLAVULANATE POTASSIUM 1 TABLET: 500; 125 TABLET, FILM COATED ORAL at 21:14

## 2025-05-10 RX ADMIN — FOLIC ACID 1 MG: 1 TABLET ORAL at 09:47

## 2025-05-10 RX ADMIN — HEPARIN SODIUM 5000 UNITS: 5000 INJECTION INTRAVENOUS; SUBCUTANEOUS at 05:30

## 2025-05-10 RX ADMIN — Medication 4000 UNITS: at 09:47

## 2025-05-10 RX ADMIN — SODIUM CHLORIDE 75 ML/HR: 0.45 INJECTION, SOLUTION INTRAVENOUS at 23:15

## 2025-05-10 RX ADMIN — B-COMPLEX W/ C & FOLIC ACID TAB 1 TABLET: TAB at 09:46

## 2025-05-10 RX ADMIN — SODIUM CHLORIDE 75 ML/HR: 0.45 INJECTION, SOLUTION INTRAVENOUS at 09:04

## 2025-05-10 RX ADMIN — VENLAFAXINE HYDROCHLORIDE 225 MG: 150 CAPSULE, EXTENDED RELEASE ORAL at 09:46

## 2025-05-10 RX ADMIN — HEPARIN SODIUM 5000 UNITS: 5000 INJECTION INTRAVENOUS; SUBCUTANEOUS at 13:07

## 2025-05-10 NOTE — ASSESSMENT & PLAN NOTE
Gram-negative bacteremia in 2 out of 2 blood cultures; positive for Klebsiella  De-escalated antibiotics from Zosyn to Augmentin based on culture sensitivities; will complete 10 day total course until 5/15/2025  See plan under sepsis

## 2025-05-10 NOTE — ASSESSMENT & PLAN NOTE
- Management per primary team   Render Risk Assessment In Note?: no Additional Notes: Schedule with Benji after antibiotics for excision removal Detail Level: Simple

## 2025-05-10 NOTE — ASSESSMENT & PLAN NOTE
Elevated serum creatinine in the setting of CKD  Creatinine on admission 2.75  Baseline appears to be 2.2-2.5  Most likely in the setting of possible nephrostomy tube obstruction  B/l nephrostomy tube exchange 5/7 competed by IR; noted complete blockage of L nephrostomy tube    Plan:  Patient switched from isolyte to 0.45 NS per nephrology in the morning of discharge   Per nephrology, patient okay for discharge as Cr has continued to trend down over the past 3 days. Will continue to follow with Dr. Villalobos outpatient.

## 2025-05-10 NOTE — ASSESSMENT & PLAN NOTE
Lab Results   Component Value Date    EGFR 15 05/11/2025    EGFR 13 05/10/2025    EGFR 12 05/09/2025    CREATININE 3.04 (H) 05/11/2025    CREATININE 3.38 (H) 05/10/2025    CREATININE 3.48 (H) 05/09/2025   MONROE on CKD  Creatinine downtrending. Will follow-up with nephrology on discharge

## 2025-05-10 NOTE — ASSESSMENT & PLAN NOTE
"69-year-old female past medical history endometrial cancer s/p resection with last dose of chemotherapy 2022, CKD with right-sided nephrostomy tube, left sided tunneled nephrostomy tube ileal conduit, and hypertension presented with sudden onset left flank pain that began 5/6/2025.  Patient reported that she had decreased urine output in her nephrostomy tubes per her usual.  States that urine in right kidney was significantly diluted; does note that right kidney is \"on its way out\" so does not typically produce a significant amount of urine.  Noted that urine from left kidney was dark in color.  Patient states that nephrostomy tube typically changed every 3 months.  Last changed March 2025.  Patient met Sepsis criteria on admission due to white blood cell count of 23K and heart rate of 98 bpm.  Tmax 99.4.  Lactic acid within normal limits.  Blood pressure stable.  Blood cultures positive for 2 out of 2 gram-negative rods both from right arm draw.  Suspect bacteremia secondary to UTI/pyelonephritis   Patient has successful exchange of bilateral nephrostomy tubes.  Per IR, left nephrostomy tube was completely blocked due to stones.  Suspect likely contributing to her elevated creatinine.  CT abdomen and pelvis: Mild left-sided hydronephrosis with moderate perinephric inflammatory fat stranding left side.  Possible pancolitis  Patient denies any fever or chills.  She denies any abdominal pain, nausea, vomiting, or diarrhea.    Plan:  Continue Augmentin 500 mg q 12 hours beginning 5/9 to complete a 10 day course of antibiotics for Klebsiella bacteremia based on culture susceptibilities. Antibiotics to be continued until 5/15.  PT/OT consulted. No needs on discharge   "

## 2025-05-10 NOTE — PROGRESS NOTES
NEPHROLOGY HOSPITAL PROGRESS NOTE   Ragini Melendez 69 y.o. female MRN: [id number]  Unit/Bed#: W -01 Encounter: [id number]  Reason for Consult: MONROE on CKD  Assessment & Plan  Acute kidney injury (HCC)  Etiology: Obstructive uropathy +/- ATN from complicated urinary infection leading to bacteremia  Baseline creatinine 2.0-2.5  Creatinine on admission 2.75 on 05/06  Creatinine on consultation 3.59 on 05/08  Creatinine today 3.38, trend is towards recovery (slow)  UA: 1+ protein, 20-30 RBC, innumerable bacteria  CT abdomen: Ileal conduit, left ureteral stent extends from left renal hilum through conduit and stoma, mild left hydronephrosis without hydroureter, moderate left perinephric inflammatory fat stranding, finding representing collecting system obstruction +/- UTIs  Status post IR procedure 05/07:   Left PCNU was completely occluded with stony debris, catheter was transected and cloudy urine immediately began draining consistent with occlusion at the hub  Contrast injection via transected catheter demonstrated patent catheter in good position,   Uncomplicated left retrograde PCNU exchange,   Right PCN was exchanged  Blood cultures growing Klebsiella  PCN cultures: Polymicrobial growth  Current status: Improving renal function + postobstructive diuresis  Current Rx: Isolyte at 75 cc/h  Changes: Switch to half-normal saline at 75 cc/h due to rising sodium level in setting of postobstructive diuresis  I expect serum creatinine to continue to improve to baseline  Monitor in hospital for another 24 hours  No indication for renal replacement therapy  Trend BMP  Sepsis (HCC)  - Management per primary team  Electrolyte abnormality (Resolved: 5/10/2025)  - Sodium level 141 today  - Transition to hypotonic fluid as above  CKD (chronic kidney disease)  - Etiology/risk factors: Hypertension, obstructive uropathy in setting of bilateral ureteral strictures, underlying glomerular pathology, age-related nephron loss   -  Baseline Cr: 0.9-1.1 until 2022, then had several episodes of MONROE due to obstructive uropathy in setting of bilateral distal ureteral strictures and currently in the range of 2.0-2.5  Gram-negative bacteremia  - Management per primary team    I have reviewed the nephrology recommendations including slow improvement in kidney function and evidence of postobstructive diuresis and in such change IV fluids to half-normal saline and observation for another 24 hours, with internal medicine team, and we are in agreement with renal plan including the information outlined above.    SUBJECTIVE / 24H INTERVAL HISTORY:  Urine output recorded as 3875 cc denies dyspnea.  Denies leg swelling.  Denies flank pain.    OBJECTIVE:  Current Weight: Weight - Scale: 71.9 kg (158 lb 8.2 oz)  Vitals:    05/09/25 0837 05/09/25 1443 05/09/25 2319 05/10/25 0735   BP:  120/70 122/71 114/68   Pulse:  71 78 70   Resp:    17   Temp:  97.6 °F (36.4 °C) 98.1 °F (36.7 °C) 98 °F (36.7 °C)   TempSrc:       SpO2: 95% 100% 92% 94%   Weight:       Height:           Intake/Output Summary (Last 24 hours) at 5/10/2025 0749  Last data filed at 5/10/2025 0558  Gross per 24 hour   Intake 600 ml   Output 3625 ml   Net -3025 ml     Review of Systems   Constitutional:  Negative for chills and fever.   HENT:  Negative for ear pain and sore throat.    Eyes:  Negative for pain and visual disturbance.   Respiratory:  Negative for cough and shortness of breath.    Cardiovascular:  Negative for chest pain and palpitations.   Gastrointestinal:  Negative for abdominal pain and vomiting.   Genitourinary:  Negative for dysuria and hematuria.   Musculoskeletal:  Negative for arthralgias and back pain.   Skin:  Negative for color change and rash.   Neurological:  Negative for seizures and syncope.   All other systems reviewed and are negative.    Physical Exam  Vitals and nursing note reviewed.   Constitutional:       General: She is not in acute distress.     Appearance: She  is well-developed.   HENT:      Head: Normocephalic and atraumatic.   Eyes:      Conjunctiva/sclera: Conjunctivae normal.   Cardiovascular:      Rate and Rhythm: Normal rate and regular rhythm.      Pulses: Normal pulses.      Heart sounds: Normal heart sounds. No murmur heard.  Pulmonary:      Effort: Pulmonary effort is normal. No respiratory distress.      Breath sounds: Normal breath sounds.   Abdominal:      Palpations: Abdomen is soft.      Tenderness: There is no abdominal tenderness.      Comments: Ileal conduit/urostomy with clear urine   Genitourinary:     Comments: Right PCN present with clear urine  Musculoskeletal:         General: No swelling.      Cervical back: Neck supple.   Skin:     General: Skin is warm and dry.      Capillary Refill: Capillary refill takes less than 2 seconds.   Neurological:      Mental Status: She is alert.   Psychiatric:         Mood and Affect: Mood normal.       Medications:    Current Facility-Administered Medications:     acetaminophen (TYLENOL) tablet 650 mg, 650 mg, Oral, Q6H PRN, Renard Villa Jr. DO, 650 mg at 05/09/25 0430    amoxicillin-clavulanate (AUGMENTIN) 500-125 mg per tablet 1 tablet, 1 tablet, Oral, Q12H CHRISTIANO, Dedra Gutierrez, DO, 1 tablet at 05/09/25 1720    ARIPiprazole (ABILIFY) tablet 20 mg, 20 mg, Oral, HS, Renard Villa Jr. DO, 20 mg at 05/09/25 2154    calcium acetate (PHOSLO) capsule 1,334 mg, 1,334 mg, Oral, BID, Renard Villa Jr., DO, 1,334 mg at 05/09/25 1720    calcium carbonate (OYSTER SHELL,OSCAL) 500 mg tablet 1 tablet, 1 tablet, Oral, Daily With Breakfast, Renard Villa Jr., DO, 1 tablet at 05/09/25 0830    Cholecalciferol (VITAMIN D3) tablet 4,000 Units, 4,000 Units, Oral, Daily, Renard Villa Jr. DO, 4,000 Units at 05/09/25 0830    folic acid (FOLVITE) tablet 1 mg, 1 mg, Oral, Daily, Renard Villa Jr., DO, 1 mg at 05/09/25 0830    heparin (porcine) subcutaneous  "injection 5,000 Units, 5,000 Units, Subcutaneous, Q8H CHRISTIANO, 5,000 Units at 05/10/25 0530 **AND** [COMPLETED] Platelet count, , , Once, Renard Villa Jr.,     lidocaine (LIDODERM) 5 % patch 1 patch, 1 patch, Topical, Daily, Dedra Gutierrez DO    multivitamin stress formula tablet 1 tablet, 1 tablet, Oral, QAM, Renard Villa Jr., , 1 tablet at 05/09/25 0831    polyethylene glycol (MIRALAX) packet 17 g, 17 g, Oral, Daily PRN, Demetrius Lal DO    potassium chloride (Klor-Con M20) CR tablet 40 mEq, 40 mEq, Oral, BID, Wade Villalobos MD, 40 mEq at 05/09/25 1720    senna-docusate sodium (SENOKOT S) 8.6-50 mg per tablet 2 tablet, 2 tablet, Oral, BID, Dedra Gutierrez DO, 2 tablet at 05/09/25 0830    sodium chloride infusion 0.45 %, 75 mL/hr, Intravenous, Continuous, Wade Villalobos MD    venlafaxine (EFFEXOR-XR) 24 hr capsule 225 mg, 225 mg, Oral, Daily, Renard Villa Jr., DO, 225 mg at 05/09/25 0830    Laboratory Results:  Results from last 7 days   Lab Units 05/10/25  0513 05/09/25  0539 05/08/25  0450 05/07/25  0819 05/06/25  2248 05/06/25  1813   WBC Thousand/uL 8.25 11.12* 16.39* 25.43*  --  23.35*   HEMOGLOBIN g/dL 9.4* 9.5* 9.8* 10.9*  --  12.3   HEMATOCRIT % 29.9* 29.3* 30.5* 33.7*  --  39.2   PLATELETS Thousands/uL 211 196 183 190 188 216   POTASSIUM mmol/L 4.0 3.1* 3.9 4.2  --  4.2   CHLORIDE mmol/L 106 102 105 107  --  104   CO2 mmol/L 25 26 15* 16*  --  17*   BUN mg/dL 52* 66* 67* 56*  --  48*   CREATININE mg/dL 3.38* 3.48* 3.59* 3.17*  --  2.75*   CALCIUM mg/dL 8.3* 8.2* 8.2* 8.6  --  9.4   MAGNESIUM mg/dL 2.2  --  2.4 1.7*  --   --        Portions of the record may have been created with voice recognition software. Occasional wrong word or \"sound a like\" substitutions may have occurred due to the inherent limitations of voice recognition software. Read the chart carefully and recognize, using context, where substitutions have occurred.If you have any questions, " please contact the dictating provider.

## 2025-05-10 NOTE — ASSESSMENT & PLAN NOTE
"69-year-old female past medical history endometrial cancer s/p resection with last dose of chemotherapy 2022, CKD with right-sided nephrostomy tube, left sided tunneled nephrostomy tube ileal conduit, and hypertension presented with sudden onset left flank pain that began 5/6/2025.  Patient reported that she had decreased urine output in her nephrostomy tubes per her usual.  States that urine in right kidney was significantly diluted; does note that right kidney is \"on its way out\" so does not typically produce a significant amount of urine.  Noted that urine from left kidney was dark in color.  Patient states that nephrostomy tube typically changed every 3 months.  Last changed March 2025.  Patient met Sepsis criteria on admission due to white blood cell count of 23K and heart rate of 98 bpm.  Tmax 99.4.  Lactic acid within normal limits.  Blood pressure stable.  Blood cultures positive for 2 out of 2 gram-negative rods both from right arm draw.  Suspect bacteremia secondary to UTI/pyelonephritis   Patient has successful exchange of bilateral nephrostomy tubes.  Per IR, left nephrostomy tube was completely blocked due to stones.  Suspect likely contributing to her elevated creatinine.  CT abdomen and pelvis: Mild left-sided hydronephrosis with moderate perinephric inflammatory fat stranding left side.  Possible pancolitis  Patient denies any fever or chills.  She denies any abdominal pain, nausea, vomiting, or diarrhea.    Plan:  Continue Augmentin 500 mg q 12 hours to complete a 10 day course of antibiotics for Klebsiella bacteremia based on culture susceptibilities until 5/15  Monitor nephrostomy tube drainage b/l  Continue to trend CBC and fever curve  PT/OT consulted. No needs on discharge   "

## 2025-05-10 NOTE — DISCHARGE SUMMARY
"Discharge Summary - Hospitalist   Name: Ragini Melendez 69 y.o. female I MRN: [id number]  Unit/Bed#: W -01 I Date of Admission: 5/6/2025   Date of Service: 5/11/2025 I Hospital Day: 4     Assessment & Plan  Sepsis (HCC)  69-year-old female past medical history endometrial cancer s/p resection with last dose of chemotherapy 2022, CKD with right-sided nephrostomy tube, left sided tunneled nephrostomy tube ileal conduit, and hypertension presented with sudden onset left flank pain that began 5/6/2025.  Patient reported that she had decreased urine output in her nephrostomy tubes per her usual.  States that urine in right kidney was significantly diluted; does note that right kidney is \"on its way out\" so does not typically produce a significant amount of urine.  Noted that urine from left kidney was dark in color.  Patient states that nephrostomy tube typically changed every 3 months.  Last changed March 2025.  Patient met Sepsis criteria on admission due to white blood cell count of 23K and heart rate of 98 bpm.  Tmax 99.4.  Lactic acid within normal limits.  Blood pressure stable.  Blood cultures positive for 2 out of 2 gram-negative rods both from right arm draw.  Suspect bacteremia secondary to UTI/pyelonephritis   Patient has successful exchange of bilateral nephrostomy tubes.  Per IR, left nephrostomy tube was completely blocked due to stones.  Suspect likely contributing to her elevated creatinine.  CT abdomen and pelvis: Mild left-sided hydronephrosis with moderate perinephric inflammatory fat stranding left side.  Possible pancolitis  Patient denies any fever or chills.  She denies any abdominal pain, nausea, vomiting, or diarrhea.    Plan:  Continue Augmentin 500 mg q 12 hours beginning 5/9 to complete a 10 day course of antibiotics for Klebsiella bacteremia based on culture susceptibilities. Antibiotics to be continued until 5/15.  PT/OT consulted. No needs on discharge   Acute kidney injury " (HCC)  Elevated serum creatinine in the setting of CKD  Creatinine on admission 2.75  Baseline appears to be 2.2-2.5  Most likely in the setting of possible nephrostomy tube obstruction  B/l nephrostomy tube exchange 5/7 competed by IR; noted complete blockage of L nephrostomy tube    Plan:  Patient switched from isolyte to 0.45 NS per nephrology in the morning of discharge   Per nephrology, patient okay for discharge as Cr has continued to trend down over the past 3 days. Will continue to follow with Dr. Villalobos outpatient.   Electrolyte abnormality (Resolved: 5/10/2025)  Electrolytes wnl on day of discharge  CKD (chronic kidney disease)  Lab Results   Component Value Date    EGFR 15 05/11/2025    EGFR 13 05/10/2025    EGFR 12 05/09/2025    CREATININE 3.04 (H) 05/11/2025    CREATININE 3.38 (H) 05/10/2025    CREATININE 3.48 (H) 05/09/2025   MONROE on CKD  Creatinine downtrending. Will follow-up with nephrology on discharge     Gram-negative bacteremia  Gram-negative bacteremia in 2 out of 2 blood cultures; positive for Klebsiella  De-escalated antibiotics from Zosyn to Augmentin based on culture sensitivities; will complete 10 day total course until 5/15/2025  See plan under sepsis  Left flank pain  Initially had flank pain on admission that improved after b/l nephrostomy tube exchange. However, did note some tenderness 5/9. Suspect may be 2/2 discomfort with bed  Patient had also previously noted a 3-day history of constipation and was started on Senokot 2 tablets twice daily.  Constipation may have been contributing to overall pain on left side.  Patient did have episode of diarrhea earlier today.  Suspect likely secondary to overflow diarrhea in the setting of constipation.  Will DC Senokot and continue to monitor bowel movements and pain  Pain currently controlled. Pain management regimen in place. Avoiding NSAIDs due to low gfr and MONROE.      Medical Problems       Resolved Problems  Date Reviewed: 3/27/2025           Resolved    Electrolyte abnormality 5/10/2025     Resolved by  Dedra Gutierrez DO    High anion gap metabolic acidosis 5/9/2025     Resolved by  Dedra Gutierrez DO    Malfunction of nephrostomy tube (HCC) 5/8/2025     Resolved by  Dedra Gutierrez DO        Discharging Physician / Practitioner: Demetrius Lal DO  PCP: Emely Yost DO  Admission Date:   Admission Orders (From admission, onward)       Ordered        05/07/25 1440  INPATIENT ADMISSION  Once            05/06/25 1938  Place in Observation  Once                          Discharge Date: 05/11/25    Consultations During Hospital Stay:  Nephrology, IR    Procedures Performed:   B/l nephrostomy tube exchange    Significant Findings / Test Results:   IR Retrograde/Ileal Conduit Nephrostomy tube check/change/reposition/upsize   Final Result by Ish Ames MD (05/09 3175)   Left retrograde nephroureteral catheter was completely occluded with stones, explaining its dysfunction. Successful exchange for a new, same sized 10.2 [place] by 45 cm MPD terminating in the renal pelvis. A sample of the urine from the obstructed renal    pelvis was sent for culture and sensitivities.      Uncomplicated right PCN exchange.      Plan:   Next routine exchange in 8 weeks given dense impaction at 12 weeks. Depending on findings at that point, consideration could be made towards gradually lengthening the exchange intervals again.      Workstation performed: [id number]         CT abdomen pelvis wo contrast   Final Result by Dulce Duggan MD (05/06 0107)      Ileal conduit. Left ureteral stent extends from the left renal hilum through the conduit and stoma.      Mild left hydronephrosis without hydroureter. Moderate left perinephric inflammatory fat stranding without fluid collection. Findings may represent collecting system obstruction and/or urinary tract infection/pyelonephritis.      Possible pancolitis.      Workstation performed: [id number]         IR  Retrograde/Ileal Conduit Nephrostomy tube check/change/reposition/upsize    (Results Pending)    2/2 BC positive for Klebsiella w/ sensitivity to Klebsiella     Incidental Findings:   None     Test Results Pending at Discharge (will require follow up):   None     Outpatient Tests Requested:  BMP    Complications:  None    Reason for Admission: L flank pain    Hospital Course:   Ragini Melendez is a 69 y.o. female patient who originally presented to the hospital on 5/6/2025 due to left-sided flank pain. The patient has history of urostomy tube and bilateral nephrostomy tube with ileal conduit. Work-up in the emergency department showed findings of mild left hydronephrosis without hydroureter. There was moderate left perinephric inflammatory fat stranding without fluid collection, suggestive of pyelonephritis. Presentation was concerning for sepsis secondary to obstructive uropathy and pyelonephritis, thus IR was consulted for bilateral nephrostomy tube exchange on 5/7, which showed left retrograde nephroureteral catheter was completely occluded with stones, explaining its dysfunction. The percutaneous nephrostomy tubes were exchanged without complications. The patient was additionally found to have blood cultures positive for Klebsiella pneumoniae and anerobic growth with Bacteroides pyogenes, and was treated with IV antibiotics initially with Zosyn. This was later transitioned to Augmentin on 5/9 based on susceptibility results from the blood cultures. Nephrology was consulted due to acute kidney injury. She was treated with sodium bicarbonate infusion due to worsening metabolic acidosis, which was transitioned to Isolyte and 0.45% normal saline due to elevated sodium levels. The patients creatinine had improved on a daily basis following nephrostomy tube exchange, and she was deemed stable for discharge on 5/11 with follow-up BMP in the outpatient setting coordinated by nephrology. She will complete a total 14 day  "course of antibiotics with Augmentin on discharge.    Please see above list of diagnoses and related plan for additional information.     Condition at Discharge: stable    Discharge Day Visit / Exam:   Subjective:  Patient seen and examined at the bedside this morning. No acute events overnight. Denies any acute compliants including fever, chills, recurrence of left flank pain, and abdominal pain. No further episodes of diarrhea after laxatives were discontinued yest  erday.  Vitals: Blood Pressure: 131/75 (05/10/25 2351)  Pulse: 80 (05/10/25 2351)  Temperature: 98.2 °F (36.8 °C) (05/10/25 2351)  Temp Source: Oral (05/08/25 0700)  Respirations: 19 (05/10/25 1519)  Height: 4' 10\" (147.3 cm) (05/07/25 0130)  Weight - Scale: 71.9 kg (158 lb 8.2 oz) (05/07/25 0130)  SpO2: 95 % (05/10/25 2351)  Physical Exam  Vitals and nursing note reviewed.   Constitutional:       General: She is not in acute distress.     Appearance: She is well-developed.   HENT:      Head: Normocephalic and atraumatic.   Eyes:      Conjunctiva/sclera: Conjunctivae normal.   Cardiovascular:      Rate and Rhythm: Normal rate and regular rhythm.      Heart sounds: No murmur heard.  Pulmonary:      Effort: Pulmonary effort is normal. No respiratory distress.      Breath sounds: Normal breath sounds.   Abdominal:      Palpations: Abdomen is soft.      Tenderness: There is no abdominal tenderness.      Comments: B/l nephrostomy tube w/ output at baseline   Musculoskeletal:         General: No swelling.      Cervical back: Neck supple.   Skin:     General: Skin is warm and dry.      Capillary Refill: Capillary refill takes less than 2 seconds.      Findings: Lesion present.      Comments: Well healing wounds at site of b/l nephrostomy tubes   Neurological:      Mental Status: She is alert.   Psychiatric:         Mood and Affect: Mood normal.          Discussion with Family: Patient declined call to .     Discharge instructions/Information to " patient and family:   See after visit summary for information provided to patient and family.      Provisions for Follow-Up Care:  See after visit summary for information related to follow-up care and any pertinent home health orders.      Mobility at time of Discharge:   Basic Mobility Inpatient Raw Score: 24  JH-HLM Goal: 8: Walk 250 feet or more  JH-HLM Achieved: 7: Walk 25 feet or more  HLM Goal NOT achieved. Continue to encourage mobility in post discharge setting.     Disposition:   Home    Planned Readmission: None    Discharge Medications:  See after visit summary for reconciled discharge medications provided to patient and/or family.      Administrative Statements   Discharge Statement:  I have spent a total time of 30 minutes in caring for this patient on the day of the visit/encounter. >30 minutes of time was spent on: Diagnostic results, Prognosis, Risks and benefits of tx options, Instructions for management, Patient and family education, Importance of tx compliance, Risk factor reductions, Impressions, Counseling / Coordination of care, Documenting in the medical record, Reviewing / ordering tests, medicine, procedures  , and Communicating with other healthcare professionals .    **Please Note: This note may have been constructed using a voice recognition system**

## 2025-05-10 NOTE — ASSESSMENT & PLAN NOTE
Etiology: Obstructive uropathy +/- ATN from complicated urinary infection leading to bacteremia  Baseline creatinine 2.0-2.5  Creatinine on admission 2.75 on 05/06  Creatinine on consultation 3.59 on 05/08  Creatinine today 3.38, trend is towards recovery (slow)  UA: 1+ protein, 20-30 RBC, innumerable bacteria  CT abdomen: Ileal conduit, left ureteral stent extends from left renal hilum through conduit and stoma, mild left hydronephrosis without hydroureter, moderate left perinephric inflammatory fat stranding, finding representing collecting system obstruction +/- UTIs  Status post IR procedure 05/07:   Left PCNU was completely occluded with stony debris, catheter was transected and cloudy urine immediately began draining consistent with occlusion at the hub  Contrast injection via transected catheter demonstrated patent catheter in good position,   Uncomplicated left retrograde PCNU exchange,   Right PCN was exchanged  Blood cultures growing Klebsiella  PCN cultures: Polymicrobial growth  Current status: Improving renal function + postobstructive diuresis  Current Rx: Isolyte at 75 cc/h  Changes: Switch to half-normal saline at 75 cc/h due to rising sodium level in setting of postobstructive diuresis  I expect serum creatinine to continue to improve to baseline  Monitor in hospital for another 24 hours  No indication for renal replacement therapy  Trend BMP

## 2025-05-10 NOTE — PLAN OF CARE
Problem: Potential for Falls  Goal: Patient will remain free of falls  Description: INTERVENTIONS:- Educate patient/family on patient safety including physical limitations- Instruct patient to call for assistance with activity - Consult OT/PT to assist with strengthening/mobility - Keep Call bell within reach- Keep bed low and locked with side rails adjusted as appropriate- Keep care items and personal belongings within reach- Initiate and maintain comfort rounds- Make Fall Risk Sign visible to staff- Offer Toileting every 2 Hours, in advance of need- Initiate/Maintain bed alarm- Obtain necessary fall risk management equipment: yellow socks - Apply yellow socks and bracelet for high fall risk patients- Consider moving patient to room near nurses station  INTERVENTIONS:- Educate patient/family on patient safety including physical limitations- Instruct patient to call for assistance with activity - Consult OT/PT to assist with strengthening/mobility - Keep Call bell within reach- Keep bed low and locked with side rails adjusted as appropriate- Keep care items and personal belongings within reach- Initiate and maintain comfort rounds- Make Fall Risk Sign visible to staff- Offer Toileting every 2 Hours, in advance of need- Initiate/Maintain bed alarm- Obtain necessary fall risk management equipment: yellow socks - Apply yellow socks and bracelet for high fall risk patients- Consider moving patient to room near nurses station  Outcome: Progressing     Problem: Prexisting or High Potential for Compromised Skin Integrity  Goal: Skin integrity is maintained or improved  Description: INTERVENTIONS:- Identify patients at risk for skin breakdown- Assess and monitor skin integrity- Assess and monitor nutrition and hydration status- Monitor labs - Assess for incontinence - Turn and reposition patient- Assist with mobility/ambulation- Relieve pressure over bony prominences- Avoid friction and shearing- Provide appropriate  hygiene as needed including keeping skin clean and dry- Evaluate need for skin moisturizer/barrier cream- Collaborate with interdisciplinary team - Patient/family teaching- Consider wound care consult   Outcome: Progressing     Problem: PAIN - ADULT  Goal: Verbalizes/displays adequate comfort level or baseline comfort level  Description: Interventions:- Encourage patient to monitor pain and request assistance- Assess pain using appropriate pain scale- Administer analgesics based on type and severity of pain and evaluate response- Implement non-pharmacological measures as appropriate and evaluate response- Consider cultural and social influences on pain and pain management- Notify physician/advanced practitioner if interventions unsuccessful or patient reports new pain  Outcome: Progressing     Problem: INFECTION - ADULT  Goal: Absence or prevention of progression during hospitalization  Description: INTERVENTIONS:- Assess and monitor for signs and symptoms of infection- Monitor lab/diagnostic results- Monitor all insertion sites, i.e. indwelling lines, tubes, and drains- Monitor endotracheal if appropriate and nasal secretions for changes in amount and color- [place] appropriate cooling/warming therapies per order- Administer medications as ordered- Instruct and encourage patient and family to use good hand hygiene technique- Identify and instruct in appropriate isolation precautions for identified infection/condition  Outcome: Progressing  Goal: Absence of fever/infection during neutropenic period  Description: INTERVENTIONS:- Monitor WBC  Outcome: Progressing     Problem: SAFETY ADULT  Goal: Patient will remain free of falls  Description: INTERVENTIONS:- Educate patient/family on patient safety including physical limitations- Instruct patient to call for assistance with activity - Consult OT/PT to assist with strengthening/mobility - Keep Call bell within reach- Keep bed low and locked with side rails adjusted  as appropriate- Keep care items and personal belongings within reach- Initiate and maintain comfort rounds- Make Fall Risk Sign visible to staff- Offer Toileting every 2 Hours, in advance of need- Initiate/Maintain bed alarm- Obtain necessary fall risk management equipment: yellow socks - Apply yellow socks and bracelet for high fall risk patients- Consider moving patient to room near nurses station  INTERVENTIONS:- Educate patient/family on patient safety including physical limitations- Instruct patient to call for assistance with activity - Consult OT/PT to assist with strengthening/mobility - Keep Call bell within reach- Keep bed low and locked with side rails adjusted as appropriate- Keep care items and personal belongings within reach- Initiate and maintain comfort rounds- Make Fall Risk Sign visible to staff- Offer Toileting every 2 Hours, in advance of need- Initiate/Maintain bed alarm- Obtain necessary fall risk management equipment: yellow socks - Apply yellow socks and bracelet for high fall risk patients- Consider moving patient to room near nurses station  Outcome: Progressing  Goal: Maintain or return to baseline ADL function  Description: INTERVENTIONS:-  Assess patient's ability to carry out ADLs; assess patient's baseline for ADL function and identify physical deficits which impact ability to perform ADLs (bathing, care of mouth/teeth, toileting, grooming, dressing, etc.)- Assess/evaluate cause of self-care deficits - Assess range of motion- Assess patient's mobility; develop plan if impaired- Assess patient's need for assistive devices and provide as appropriate- Encourage maximum independence but intervene and supervise when necessary- Involve family in performance of ADLs- Assess for home care needs following discharge - Consider OT consult to assist with ADL evaluation and planning for discharge- Provide patient education as appropriate  Outcome: Progressing     Problem: DISCHARGE PLANNING  Goal:  Discharge to home or other facility with appropriate resources  Description: INTERVENTIONS:- Identify barriers to discharge w/patient and caregiver- Arrange for needed discharge resources and transportation as appropriate- Identify discharge learning needs (meds, wound care, etc.)- Arrange for interpretive services to assist at discharge as needed- Refer to Case Management Department for coordinating discharge planning if the patient needs post-hospital services based on physician/advanced practitioner order or complex needs related to functional status, cognitive ability, or social support system  Outcome: Progressing

## 2025-05-10 NOTE — PROGRESS NOTES
"Progress Note - Hospitalist   Name: Ragini Melendez 69 y.o. female I MRN: [id number]  Unit/Bed#: W -01 I Date of Admission: 5/6/2025   Date of Service: 5/10/2025 I Hospital Day: 3    Assessment & Plan  Sepsis (HCC)  69-year-old female past medical history endometrial cancer s/p resection with last dose of chemotherapy 2022, CKD with right-sided nephrostomy tube, left sided tunneled nephrostomy tube ileal conduit, and hypertension presented with sudden onset left flank pain that began 5/6/2025.  Patient reported that she had decreased urine output in her nephrostomy tubes per her usual.  States that urine in right kidney was significantly diluted; does note that right kidney is \"on its way out\" so does not typically produce a significant amount of urine.  Noted that urine from left kidney was dark in color.  Patient states that nephrostomy tube typically changed every 3 months.  Last changed March 2025.  Patient met Sepsis criteria on admission due to white blood cell count of 23K and heart rate of 98 bpm.  Tmax 99.4.  Lactic acid within normal limits.  Blood pressure stable.  Blood cultures positive for 2 out of 2 gram-negative rods both from right arm draw.  Suspect bacteremia secondary to UTI/pyelonephritis   Patient has successful exchange of bilateral nephrostomy tubes.  Per IR, left nephrostomy tube was completely blocked due to stones.  Suspect likely contributing to her elevated creatinine.  CT abdomen and pelvis: Mild left-sided hydronephrosis with moderate perinephric inflammatory fat stranding left side.  Possible pancolitis  Patient denies any fever or chills.  She denies any abdominal pain, nausea, vomiting, or diarrhea.    Plan:  Continue Augmentin 500 mg q 12 hours to complete a 10 day course of antibiotics for Klebsiella bacteremia based on culture susceptibilities until 5/15  Monitor nephrostomy tube drainage b/l  Continue to trend CBC and fever curve  PT/OT consulted. No needs on discharge "   Acute kidney injury (HCC)  Elevated serum creatinine in the setting of CKD  Creatinine on admission 2.75  Baseline appears to be 2.2-2.5  Most likely in the setting of possible nephrostomy tube obstruction  B/l nephrostomy tube exchange 5/7 competed by IR; noted complete blockage of L nephrostomy tube    Plan:  Monitor daily BMP  Patient to stay another day due to concern for postobstructive diuresis. Fluids changed from isolyte to NS 0.45%. Anticipate discharge 5/11.   CKD (chronic kidney disease)  Lab Results   Component Value Date    EGFR 13 05/10/2025    EGFR 12 05/09/2025    EGFR 12 05/08/2025    CREATININE 3.38 (H) 05/10/2025    CREATININE 3.48 (H) 05/09/2025    CREATININE 3.59 (H) 05/08/2025   MONORE on CKD  Creatinine downtrending from 3.59 to 3.38  Nephrology on board.  Appreciate recommendations    Gram-negative bacteremia  Gram-negative bacteremia in 2 out of 2 blood cultures; positive for Klebsiella  De-escalated antibiotics from Zosyn to Augmentin based on culture sensitivities; will complete 10 day total course until 5/15/2025  See plan under sepsis  Left flank pain  Initially had flank pain on admission that improved after b/l nephrostomy tube exchange. However, did note some tenderness 5/9. Suspect may be 2/2 discomfort with bed  Patient had also previously noted a 3-day history of constipation and was started on Senokot 2 tablets twice daily.  Constipation may have been contributing to overall pain on left side.  Patient did have episode of diarrhea earlier today.  Suspect likely secondary to overflow diarrhea in the setting of constipation.  Will DC Senokot and continue to monitor bowel movements and pain  Pain currently controlled. Pain management regimen in place. Avoiding NSAIDs due to low gfr and MONROE.     VTE Pharmacologic Prophylaxis: VTE Score: 9 High Risk (Score >/= 5) - Pharmacological DVT Prophylaxis Ordered: heparin. Sequential Compression Devices Ordered.    Mobility:   Basic Mobility  Inpatient Raw Score: 19  JH-HLM Goal: 6: Walk 10 steps or more  JH-HLM Achieved: 3: Sit at edge of bed  JH-HLM Goal NOT achieved. Continue with multidisciplinary rounding and encourage appropriate mobility to improve upon JH-HLM goals.    Patient Centered Rounds: I performed bedside rounds with nursing staff today.   Discussions with Specialists or Other Care Team Provider: Nephrology     Education and Discussions with Family / Patient: Patient declined call to .  Patient reported she has already updated sister Nahomi    Current Length of Stay: 3 day(s)  Current Patient Status: Inpatient   Certification Statement: The patient will continue to require additional inpatient hospital stay due to postobstructive diuresis  Discharge Plan: Anticipate discharge tomorrow to home.    Code Status: Level 1 - Full Code    Subjective   No overnight events. Discussed with patient benefit of staying another day due to concern for postobstructive diuresis. Patient agreeable. Will continue pain management prn for L flank pain.     Objective :  Temp:  [97.6 °F (36.4 °C)-98.1 °F (36.7 °C)] 98 °F (36.7 °C)  HR:  [70-78] 70  BP: (114-122)/(68-71) 114/68  Resp:  [17] 17  SpO2:  [92 %-100 %] 94 %  O2 Device: None (Room air)    Body mass index is 33.13 kg/m².     Input and Output Summary (last 24 hours):     Intake/Output Summary (Last 24 hours) at 5/10/2025 0749  Last data filed at 5/10/2025 0558  Gross per 24 hour   Intake 600 ml   Output 3625 ml   Net -3025 ml       Physical Exam  Vitals and nursing note reviewed.   Constitutional:       General: She is not in acute distress.     Appearance: She is well-developed.   HENT:      Head: Normocephalic and atraumatic.   Eyes:      Conjunctiva/sclera: Conjunctivae normal.   Cardiovascular:      Rate and Rhythm: Normal rate and regular rhythm.      Heart sounds: No murmur heard.  Pulmonary:      Effort: Pulmonary effort is normal. No respiratory distress.      Breath sounds: Normal  breath sounds.   Abdominal:      Palpations: Abdomen is soft.      Tenderness: There is no abdominal tenderness.      Comments: B/l nephrostomy tube w/ output at baseline   Musculoskeletal:         General: No swelling.      Cervical back: Neck supple.   Skin:     General: Skin is warm and dry.      Capillary Refill: Capillary refill takes less than 2 seconds.      Findings: Lesion present.      Comments: Well healing wounds at site of b/l nephrostomy tubes   Neurological:      Mental Status: She is alert.   Psychiatric:         Mood and Affect: Mood normal.         Lines/Drains:  Lines/Drains/Airways       Active Status       Name Placement date Placement time Site Days    Port A Cath 11/12/19 Left Chest 11/12/19  1221  Chest  2005    Nephrostomy Retrograde/ Ileal Conduit Left 102 Fr. 05/07/25  1124  Left  2    Nephrostomy Right 10.2 Fr. 05/07/25  1148  Right  2    Urostomy Ileal conduit RLQ 10/24/24  1158  RLQ  197                    Central Line:  Goal for removal: Will discontinue when meds requiring line are completed.               Lab Results: I have reviewed the following results:   Results from last 7 days   Lab Units 05/10/25  0513 05/08/25  0450 05/07/25  0819   WBC Thousand/uL 8.25   < > 25.43*   HEMOGLOBIN g/dL 9.4*   < > 10.9*   HEMATOCRIT % 29.9*   < > 33.7*   PLATELETS Thousands/uL 211   < > 190   BANDS PCT %  --   --  7   LYMPHO PCT %  --   --  2*   MONO PCT %  --   --  4   EOS PCT %  --   --  0    < > = values in this interval not displayed.     Results from last 7 days   Lab Units 05/10/25  0513 05/07/25  0819 05/06/25  1813   SODIUM mmol/L 141   < > 134*   POTASSIUM mmol/L 4.0   < > 4.2   CHLORIDE mmol/L 106   < > 104   CO2 mmol/L 25   < > 17*   BUN mg/dL 52*   < > 48*   CREATININE mg/dL 3.38*   < > 2.75*   ANION GAP mmol/L 10   < > 13   CALCIUM mg/dL 8.3*   < > 9.4   ALBUMIN g/dL  --   --  4.2   TOTAL BILIRUBIN mg/dL  --   --  0.41   ALK PHOS U/L  --   --  110*   ALT U/L  --   --  29   AST U/L   --   --  18   GLUCOSE RANDOM mg/dL 94   < > 118    < > = values in this interval not displayed.     Results from last 7 days   Lab Units 05/06/25 2034   INR  0.98             Results from last 7 days   Lab Units 05/06/25 2034   LACTIC ACID mmol/L 1.2       Recent Cultures (last 7 days):   Results from last 7 days   Lab Units 05/07/25  1142 05/06/25  2106 05/06/25 2034   BLOOD CULTURE   --  Klebsiella pneumoniae* Klebsiella pneumoniae*   GRAM STAIN RESULT  3+ Polys*  2+ Gram positive cocci in chains and clusters*  1+ Gram negative rods* Gram negative rods* Gram negative rods*   BODY FLUID CULTURE, STERILE  1+ Growth of Klebsiella pneumoniae*  2+ Growth of  --   --              Last 24 Hours Medication List:     Current Facility-Administered Medications:     acetaminophen (TYLENOL) tablet 650 mg, Q6H PRN    amoxicillin-clavulanate (AUGMENTIN) 500-125 mg per tablet 1 tablet, Q12H CHRISTIANO    ARIPiprazole (ABILIFY) tablet 20 mg, HS    calcium acetate (PHOSLO) capsule 1,334 mg, BID    calcium carbonate (OYSTER SHELL,OSCAL) 500 mg tablet 1 tablet, Daily With Breakfast    Cholecalciferol (VITAMIN D3) tablet 4,000 Units, Daily    folic acid (FOLVITE) tablet 1 mg, Daily    heparin (porcine) subcutaneous injection 5,000 Units, Q8H CHRISTIANO **AND** [COMPLETED] Platelet count, Once    lidocaine (LIDODERM) 5 % patch 1 patch, Daily    multivitamin stress formula tablet 1 tablet, QAM    polyethylene glycol (MIRALAX) packet 17 g, Daily PRN    potassium chloride (Klor-Con M20) CR tablet 40 mEq, BID    senna-docusate sodium (SENOKOT S) 8.6-50 mg per tablet 2 tablet, BID    sodium chloride infusion 0.45 %, Continuous    venlafaxine (EFFEXOR-XR) 24 hr capsule 225 mg, Daily    Administrative Statements   Today, Patient Was Seen By: Dedra Gutierrez DO      **Please Note: This note may have been constructed using a voice recognition system.**

## 2025-05-10 NOTE — ASSESSMENT & PLAN NOTE
Elevated serum creatinine in the setting of CKD  Creatinine on admission 2.75  Baseline appears to be 2.2-2.5  Most likely in the setting of possible nephrostomy tube obstruction  B/l nephrostomy tube exchange 5/7 competed by IR; noted complete blockage of L nephrostomy tube    Plan:  Monitor daily BMP  Patient to stay another day due to concern for postobstructive diuresis. Fluids changed from isolyte to NS 0.45%. Anticipate discharge 5/11.

## 2025-05-10 NOTE — PLAN OF CARE
Problem: PAIN - ADULT  Goal: Verbalizes/displays adequate comfort level or baseline comfort level  Description: Interventions:- Encourage patient to monitor pain and request assistance- Assess pain using appropriate pain scale- Administer analgesics based on type and severity of pain and evaluate response- Implement non-pharmacological measures as appropriate and evaluate response- Consider cultural and social influences on pain and pain management- Notify physician/advanced practitioner if interventions unsuccessful or patient reports new pain  Outcome: Progressing     Problem: INFECTION - ADULT  Goal: Absence or prevention of progression during hospitalization  Description: INTERVENTIONS:- Assess and monitor for signs and symptoms of infection- Monitor lab/diagnostic results- Monitor all insertion sites, i.e. indwelling lines, tubes, and drains- Monitor endotracheal if appropriate and nasal secretions for changes in amount and color- [place] appropriate cooling/warming therapies per order- Administer medications as ordered- Instruct and encourage patient and family to use good hand hygiene technique- Identify and instruct in appropriate isolation precautions for identified infection/condition  Outcome: Progressing

## 2025-05-10 NOTE — DISCHARGE INSTR - AVS FIRST PAGE
Dear Ragini Melendez,     It was our pleasure to care for you here at [facility].  It is our hope that we were always able to exceed the expected standards for your care during your stay.  You were hospitalized due to left flank pain.  You were cared for on the 4th floor by Demetrius Lal DO under the service of Nathan Romero DO with the [facility] who covers for your primary care physician (PCP), Emely Yost DO, while you were hospitalized.  If you have any questions or concerns related to this hospitalization, you may contact us at .  For follow up as well as any medication refills, we recommend that you follow up with your primary care physician.  A registered nurse will reach out to you by phone within a few days after your discharge to answer any additional questions that you may have after going home.  However, at this time we provide for you here, the most important instructions / recommendations at discharge:     Notable Medication Adjustments -   Please START taking one tablet Augmentin by mouth every 12 hours until 5/19/2025.   Testing Required after Discharge -   Basic metabolic panel to be ordered by your PCP in one week to monitor your kidney function.  ** Please contact your PCP to request testing orders for any of the testing recommended here **  Important follow up information -   Please follow-up with your PCP within a week of discharge  Other Instructions -   Please take all your medications regularly as directed  If symptoms return/persist contact your PCP if unable then visit to nearest ER  Please review this entire after visit summary as additional general instructions including medication list, appointments, activity, diet, any pertinent wound care, and other additional recommendations from your care team that may be provided for you.      Sincerely,     Demetrius Lal DO

## 2025-05-10 NOTE — ASSESSMENT & PLAN NOTE
Initially had flank pain on admission that improved after b/l nephrostomy tube exchange. However, did note some tenderness 5/9. Suspect may be 2/2 discomfort with bed  Patient had also previously noted a 3-day history of constipation and was started on Senokot 2 tablets twice daily.  Constipation may have been contributing to overall pain on left side.  Patient did have episode of diarrhea earlier today.  Suspect likely secondary to overflow diarrhea in the setting of constipation.  Will DC Senokot and continue to monitor bowel movements and pain  Pain currently controlled. Pain management regimen in place. Avoiding NSAIDs due to low gfr and MONROE.

## 2025-05-10 NOTE — ASSESSMENT & PLAN NOTE
Lab Results   Component Value Date    EGFR 13 05/10/2025    EGFR 12 05/09/2025    EGFR 12 05/08/2025    CREATININE 3.38 (H) 05/10/2025    CREATININE 3.48 (H) 05/09/2025    CREATININE 3.59 (H) 05/08/2025   MONROE on CKD  Creatinine downtrending from 3.59 to 3.38  Nephrology on board.  Appreciate recommendations

## 2025-05-11 VITALS
BODY MASS INDEX: 33.27 KG/M2 | RESPIRATION RATE: 16 BRPM | WEIGHT: 158.51 LBS | DIASTOLIC BLOOD PRESSURE: 61 MMHG | OXYGEN SATURATION: 95 % | SYSTOLIC BLOOD PRESSURE: 124 MMHG | TEMPERATURE: 98.2 F | HEIGHT: 58 IN | HEART RATE: 76 BPM

## 2025-05-11 LAB
ANION GAP SERPL CALCULATED.3IONS-SCNC: 8 MMOL/L (ref 4–13)
BUN SERPL-MCNC: 45 MG/DL (ref 5–25)
CALCIUM SERPL-MCNC: 8.6 MG/DL (ref 8.4–10.2)
CHLORIDE SERPL-SCNC: 108 MMOL/L (ref 96–108)
CO2 SERPL-SCNC: 23 MMOL/L (ref 21–32)
CREAT SERPL-MCNC: 3.04 MG/DL (ref 0.6–1.3)
ERYTHROCYTE [DISTWIDTH] IN BLOOD BY AUTOMATED COUNT: 13.3 % (ref 11.6–15.1)
GFR SERPL CREATININE-BSD FRML MDRD: 15 ML/MIN/1.73SQ M
GLUCOSE SERPL-MCNC: 89 MG/DL (ref 65–140)
HCT VFR BLD AUTO: 30.6 % (ref 34.8–46.1)
HGB BLD-MCNC: 9.6 G/DL (ref 11.5–15.4)
MCH RBC QN AUTO: 29 PG (ref 26.8–34.3)
MCHC RBC AUTO-ENTMCNC: 31.4 G/DL (ref 31.4–37.4)
MCV RBC AUTO: 92 FL (ref 82–98)
PLATELET # BLD AUTO: 214 THOUSANDS/UL (ref 149–390)
PMV BLD AUTO: 9.4 FL (ref 8.9–12.7)
POTASSIUM SERPL-SCNC: 4.4 MMOL/L (ref 3.5–5.3)
RBC # BLD AUTO: 3.31 MILLION/UL (ref 3.81–5.12)
SODIUM SERPL-SCNC: 139 MMOL/L (ref 135–147)
WBC # BLD AUTO: 7.88 THOUSAND/UL (ref 4.31–10.16)

## 2025-05-11 PROCEDURE — 85027 COMPLETE CBC AUTOMATED: CPT

## 2025-05-11 PROCEDURE — 99239 HOSP IP/OBS DSCHRG MGMT >30: CPT | Performed by: INTERNAL MEDICINE

## 2025-05-11 PROCEDURE — 80048 BASIC METABOLIC PNL TOTAL CA: CPT

## 2025-05-11 PROCEDURE — 99232 SBSQ HOSP IP/OBS MODERATE 35: CPT | Performed by: INTERNAL MEDICINE

## 2025-05-11 RX ORDER — LOPERAMIDE HYDROCHLORIDE 2 MG/1
2 CAPSULE ORAL ONCE
Status: COMPLETED | OUTPATIENT
Start: 2025-05-11 | End: 2025-05-11

## 2025-05-11 RX ORDER — AMOXICILLIN AND CLAVULANATE POTASSIUM 500; 125 MG/1; MG/1
1 TABLET, FILM COATED ORAL EVERY 12 HOURS SCHEDULED
Qty: 19 TABLET | Refills: 0 | Status: SHIPPED | OUTPATIENT
Start: 2025-05-11 | End: 2025-05-21

## 2025-05-11 RX ADMIN — LOPERAMIDE HYDROCHLORIDE 2 MG: 2 CAPSULE ORAL at 14:56

## 2025-05-11 RX ADMIN — B-COMPLEX W/ C & FOLIC ACID TAB 1 TABLET: TAB at 08:21

## 2025-05-11 RX ADMIN — AMOXICILLIN AND CLAVULANATE POTASSIUM 1 TABLET: 500; 125 TABLET, FILM COATED ORAL at 08:21

## 2025-05-11 RX ADMIN — HEPARIN SODIUM 5000 UNITS: 5000 INJECTION INTRAVENOUS; SUBCUTANEOUS at 13:42

## 2025-05-11 RX ADMIN — Medication 4000 UNITS: at 08:21

## 2025-05-11 RX ADMIN — CALCIUM ACETATE 1334 MG: 667 CAPSULE ORAL at 08:21

## 2025-05-11 RX ADMIN — FOLIC ACID 1 MG: 1 TABLET ORAL at 08:21

## 2025-05-11 RX ADMIN — Medication 1 TABLET: at 07:34

## 2025-05-11 RX ADMIN — HEPARIN SODIUM 5000 UNITS: 5000 INJECTION INTRAVENOUS; SUBCUTANEOUS at 05:57

## 2025-05-11 RX ADMIN — VENLAFAXINE HYDROCHLORIDE 225 MG: 150 CAPSULE, EXTENDED RELEASE ORAL at 08:21

## 2025-05-11 NOTE — PROGRESS NOTES
NEPHROLOGY HOSPITAL PROGRESS NOTE   Ragini Melendez 69 y.o. female MRN: [id number]  Unit/Bed#: W -01 Encounter: [id number]  Reason for Consult: MONROE of CKD  Assessment & Plan  Acute kidney injury (HCC)  Etiology: Obstructive uropathy +/- ATN from complicated urinary infection leading to bacteremia  Baseline creatinine 2.0-2.5  Creatinine on admission 2.75 on 05/06  Creatinine on consultation 3.59 on 05/08  Creatinine today improved to 3.04  UA: 1+ protein, 20-30 RBC, innumerable bacteria  CT abdomen: Ileal conduit, left ureteral stent extends from left renal hilum through conduit and stoma, mild left hydronephrosis without hydroureter, moderate left perinephric inflammatory fat stranding, finding representing collecting system obstruction +/- UTIs  Status post IR procedure 05/07:   Left PCNU was completely occluded with stony debris, catheter was transected and cloudy urine immediately began draining consistent with occlusion at the hub  Contrast injection via transected catheter demonstrated patent catheter in good position,   Uncomplicated left retrograde PCNU exchange,   Right PCN was exchanged  Blood cultures growing Klebsiella  PCN cultures: Polymicrobial growth  Current status: Improving renal function + postobstructive diuresis (slowed down)  Current Rx: Half-normal saline at 75 cc/h due to rising sodium level in setting of postobstructive diuresis  Changes: Discontinue IV fluids  I expect serum creatinine to continue to improve to baseline  BMP 1 week as outpatient, I will arrange  Sepsis (HCC)  - Management per primary team  CKD (chronic kidney disease)  - Etiology/risk factors: Hypertension, obstructive uropathy in setting of bilateral ureteral strictures, underlying glomerular pathology, age-related nephron loss   - Baseline Cr: 0.9-1.1 until 2022, then had several episodes of MONROE due to obstructive uropathy in setting of bilateral distal ureteral strictures and currently in the range of  2.0-2.5  Gram-negative bacteremia  - Currently on Augmentin, to complete a course as outpatient  - Management per primary team    I have reviewed the nephrology recommendations including ongoing improvement in kidney function and to discontinue IV fluids and to discharge home with BMP in 1 week as outpatient, with internal medicine team, and we are in agreement with renal plan including the information outlined above.    SUBJECTIVE / 24H INTERVAL HISTORY:  Urine output recorded as 2725 cc.  Denies dyspnea.  Denies abdominal pain.  Urine remains clear.    OBJECTIVE:  Current Weight: Weight - Scale: 71.9 kg (158 lb 8.2 oz)  Vitals:    05/09/25 2319 05/10/25 0735 05/10/25 1519 05/10/25 2351   BP: 122/71 114/68 135/78 131/75   Pulse: 78 70 79 80   Resp:  17 19    Temp: 98.1 °F (36.7 °C) 98 °F (36.7 °C) 97.5 °F (36.4 °C) 98.2 °F (36.8 °C)   TempSrc:       SpO2: 92% 94% 95% 95%   Weight:       Height:           Intake/Output Summary (Last 24 hours) at 5/11/2025 0708  Last data filed at 5/11/2025 0601  Gross per 24 hour   Intake 2955 ml   Output 3075 ml   Net -120 ml     Review of Systems   Constitutional:  Negative for chills and fever.   HENT:  Negative for ear pain and sore throat.    Eyes:  Negative for pain and visual disturbance.   Respiratory:  Negative for cough and shortness of breath.    Cardiovascular:  Negative for chest pain and palpitations.   Gastrointestinal:  Negative for abdominal pain and vomiting.   Genitourinary:  Negative for dysuria and hematuria.   Musculoskeletal:  Negative for arthralgias and back pain.   Skin:  Negative for color change and rash.   Neurological:  Negative for seizures and syncope.   All other systems reviewed and are negative.    Physical Exam  Vitals and nursing note reviewed.   Constitutional:       General: She is not in acute distress.     Appearance: She is well-developed.   HENT:      Head: Normocephalic and atraumatic.   Eyes:      Conjunctiva/sclera: Conjunctivae normal.    Cardiovascular:      Rate and Rhythm: Normal rate and regular rhythm.      Pulses: Normal pulses.      Heart sounds: Normal heart sounds. No murmur heard.  Pulmonary:      Effort: Pulmonary effort is normal. No respiratory distress.      Breath sounds: Normal breath sounds.   Abdominal:      Palpations: Abdomen is soft.      Tenderness: There is no abdominal tenderness.   Genitourinary:     Comments: Ileal conduit present, left PCN present  Musculoskeletal:         General: No swelling.      Cervical back: Neck supple.   Skin:     General: Skin is warm and dry.      Capillary Refill: Capillary refill takes less than 2 seconds.   Neurological:      Mental Status: She is alert.   Psychiatric:         Mood and Affect: Mood normal.       Medications:    Current Facility-Administered Medications:     acetaminophen (TYLENOL) tablet 650 mg, 650 mg, Oral, Q6H PRN, Renard Villa Jr., DO, 650 mg at 05/09/25 0430    amoxicillin-clavulanate (AUGMENTIN) 500-125 mg per tablet 1 tablet, 1 tablet, Oral, Q12H CHRISTIANO, Dedra Gutierrez, DO, 1 tablet at 05/10/25 2114    ARIPiprazole (ABILIFY) tablet 20 mg, 20 mg, Oral, HS, Renard Villa Jr., DO, 20 mg at 05/10/25 2114    calcium acetate (PHOSLO) capsule 1,334 mg, 1,334 mg, Oral, BID, Renard Villa Jr., DO, 1,334 mg at 05/10/25 1735    calcium carbonate (OYSTER SHELL,OSCAL) 500 mg tablet 1 tablet, 1 tablet, Oral, Daily With Breakfast, Renard Villa Jr., DO, 1 tablet at 05/10/25 0956    Cholecalciferol (VITAMIN D3) tablet 4,000 Units, 4,000 Units, Oral, Daily, Renard Villa Jr., DO, 4,000 Units at 05/10/25 0947    folic acid (FOLVITE) tablet 1 mg, 1 mg, Oral, Daily, Renard Villa Jr., DO, 1 mg at 05/10/25 0947    heparin (porcine) subcutaneous injection 5,000 Units, 5,000 Units, Subcutaneous, Q8H CHRISTIANO, 5,000 Units at 05/11/25 0557 **AND** [COMPLETED] Platelet count, , , Once, Renard Villa Jr., DO     "lidocaine (LIDODERM) 5 % patch 1 patch, 1 patch, Topical, Daily, Dedra Gutierrez,     multivitamin stress formula tablet 1 tablet, 1 tablet, Oral, QAM, Renard Villa Jr., DO, 1 tablet at 05/10/25 0946    polyethylene glycol (MIRALAX) packet 17 g, 17 g, Oral, Daily PRN, Demetrius Lal,     venlafaxine (EFFEXOR-XR) 24 hr capsule 225 mg, 225 mg, Oral, Daily, Renard Villa Jr., DO, 225 mg at 05/10/25 0946    Laboratory Results:  Results from last 7 days   Lab Units 05/11/25  0600 05/10/25  0513 05/09/25  0539 05/08/25  0450 05/07/25  0819 05/06/25  2248 05/06/25  1813   WBC Thousand/uL 7.88 8.25 11.12* 16.39* 25.43*  --  23.35*   HEMOGLOBIN g/dL 9.6* 9.4* 9.5* 9.8* 10.9*  --  12.3   HEMATOCRIT % 30.6* 29.9* 29.3* 30.5* 33.7*  --  39.2   PLATELETS Thousands/uL 214 211 196 183 190 188 216   POTASSIUM mmol/L 4.4 4.0 3.1* 3.9 4.2  --  4.2   CHLORIDE mmol/L 108 106 102 105 107  --  104   CO2 mmol/L 23 25 26 15* 16*  --  17*   BUN mg/dL 45* 52* 66* 67* 56*  --  48*   CREATININE mg/dL 3.04* 3.38* 3.48* 3.59* 3.17*  --  2.75*   CALCIUM mg/dL 8.6 8.3* 8.2* 8.2* 8.6  --  9.4   MAGNESIUM mg/dL  --  2.2  --  2.4 1.7*  --   --        Portions of the record may have been created with voice recognition software. Occasional wrong word or \"sound a like\" substitutions may have occurred due to the inherent limitations of voice recognition software. Read the chart carefully and recognize, using context, where substitutions have occurred.If you have any questions, please contact the dictating provider.    "

## 2025-05-11 NOTE — ASSESSMENT & PLAN NOTE
Etiology: Obstructive uropathy +/- ATN from complicated urinary infection leading to bacteremia  Baseline creatinine 2.0-2.5  Creatinine on admission 2.75 on 05/06  Creatinine on consultation 3.59 on 05/08  Creatinine today improved to 3.04  UA: 1+ protein, 20-30 RBC, innumerable bacteria  CT abdomen: Ileal conduit, left ureteral stent extends from left renal hilum through conduit and stoma, mild left hydronephrosis without hydroureter, moderate left perinephric inflammatory fat stranding, finding representing collecting system obstruction +/- UTIs  Status post IR procedure 05/07:   Left PCNU was completely occluded with stony debris, catheter was transected and cloudy urine immediately began draining consistent with occlusion at the hub  Contrast injection via transected catheter demonstrated patent catheter in good position,   Uncomplicated left retrograde PCNU exchange,   Right PCN was exchanged  Blood cultures growing Klebsiella  PCN cultures: Polymicrobial growth  Current status: Improving renal function + postobstructive diuresis (slowed down)  Current Rx: Half-normal saline at 75 cc/h due to rising sodium level in setting of postobstructive diuresis  Changes: Discontinue IV fluids  I expect serum creatinine to continue to improve to baseline  BMP 1 week as outpatient, I will arrange

## 2025-05-11 NOTE — PLAN OF CARE
Problem: PAIN - ADULT  Goal: Verbalizes/displays adequate comfort level or baseline comfort level  Description: Interventions:- Encourage patient to monitor pain and request assistance- Assess pain using appropriate pain scale- Administer analgesics based on type and severity of pain and evaluate response- Implement non-pharmacological measures as appropriate and evaluate response- Consider cultural and social influences on pain and pain management- Notify physician/advanced practitioner if interventions unsuccessful or patient reports new pain  Outcome: Progressing     Problem: Prexisting or High Potential for Compromised Skin Integrity  Goal: Skin integrity is maintained or improved  Description: INTERVENTIONS:- Identify patients at risk for skin breakdown- Assess and monitor skin integrity- Assess and monitor nutrition and hydration status- Monitor labs - Assess for incontinence - Turn and reposition patient- Assist with mobility/ambulation- Relieve pressure over bony prominences- Avoid friction and shearing- Provide appropriate hygiene as needed including keeping skin clean and dry- Evaluate need for skin moisturizer/barrier cream- Collaborate with interdisciplinary team - Patient/family teaching- Consider wound care consult   Outcome: Progressing

## 2025-05-12 ENCOUNTER — TRANSITIONAL CARE MANAGEMENT (OUTPATIENT)
Dept: FAMILY MEDICINE CLINIC | Facility: CLINIC | Age: 70
End: 2025-05-12

## 2025-05-12 DIAGNOSIS — A41.9 SEPSIS, DUE TO UNSPECIFIED ORGANISM, UNSPECIFIED WHETHER ACUTE ORGAN DYSFUNCTION PRESENT (HCC): Primary | ICD-10-CM

## 2025-05-12 DIAGNOSIS — Z91.09 ENVIRONMENTAL ALLERGIES: ICD-10-CM

## 2025-05-12 RX ORDER — CETIRIZINE HYDROCHLORIDE 10 MG/1
10 TABLET ORAL DAILY
Qty: 90 TABLET | Refills: 1 | Status: SHIPPED | OUTPATIENT
Start: 2025-05-12

## 2025-05-12 NOTE — UTILIZATION REVIEW
NOTIFICATION OF ADMISSION DISCHARGE   This is a Notification of Discharge from [facility]. Please be advised that this patient has been discharge from our facility. Below you will find the admission and discharge date and time including the patient’s disposition.   UTILIZATION REVIEW CONTACT:  Utilization Review Assistants  Network Utilization Review Department  Phone: [phone] x carefully listen to the prompts. All voicemails are confidential.  Email: [email or web address]     ADMISSION INFORMATION  PRESENTATION DATE: 5/6/2025  5:22 PM  OBERVATION ADMISSION DATE: 05/06/2025 1938  INPATIENT ADMISSION DATE: 5/7/25  2:40 PM   DISCHARGE DATE: 5/11/2025  4:28 PM   DISPOSITION:Home/Self Care    Network Utilization Review Department  ATTENTION: Please call with any questions or concerns to [phone] and carefully listen to the prompts so that you are directed to the right person. All voicemails are confidential.   For Discharge needs, contact Care Management DC Support Team at [phone] opt. 2  Send all requests for admission clinical reviews, approved or denied determinations and any other requests to dedicated fax number below belonging to the campus where the patient is receiving treatment. List of dedicated fax numbers for the Facilities:  FACILITY NAME UR FAX NUMBER   ADMISSION DENIALS (Administrative/Medical Necessity) [phone]   DISCHARGE SUPPORT TEAM ([facility]) [phone]   PARENT CHILD HEALTH (Maternity/NICU/Pediatrics) [phone]   [facility] [phone]   [facility] [phone]   [facility] [phone]   [facility] [phone]   Critical access hospital [phone]   [facility] [phone]   [facility] [phone]   [facility] [phone]   [facility]  [facility] [phone]   Critical access hospital [phone]   [facility] [phone]   [facility] [phone]

## 2025-05-13 ENCOUNTER — TELEPHONE (OUTPATIENT)
Dept: NEPHROLOGY | Facility: CLINIC | Age: 70
End: 2025-05-13

## 2025-05-13 DIAGNOSIS — N17.9 ACUTE KIDNEY INJURY (HCC): Primary | ICD-10-CM

## 2025-05-13 NOTE — TELEPHONE ENCOUNTER
Spoke with patient about the following, she expressed understanding and thanked us for the call:    ----- Message from Wade Villalobos MD sent at 5/11/2025  2:53 PM EDT -----  Regarding: Hospital discharge lab work  Seen in the hospital for MONROE.  BMP 1 week.  Please remind her.  Thank you

## 2025-05-15 ENCOUNTER — APPOINTMENT (OUTPATIENT)
Dept: LAB | Facility: CLINIC | Age: 70
End: 2025-05-15
Payer: COMMERCIAL

## 2025-05-15 ENCOUNTER — OFFICE VISIT (OUTPATIENT)
Dept: FAMILY MEDICINE CLINIC | Facility: CLINIC | Age: 70
End: 2025-05-15
Payer: COMMERCIAL

## 2025-05-15 VITALS
BODY MASS INDEX: 33.17 KG/M2 | SYSTOLIC BLOOD PRESSURE: 124 MMHG | DIASTOLIC BLOOD PRESSURE: 80 MMHG | WEIGHT: 158 LBS | HEIGHT: 58 IN

## 2025-05-15 DIAGNOSIS — T83.092A OBSTRUCTION OF NEPHROSTOMY TUBE (HCC): ICD-10-CM

## 2025-05-15 DIAGNOSIS — N17.9 AKI (ACUTE KIDNEY INJURY) (HCC): ICD-10-CM

## 2025-05-15 DIAGNOSIS — N10 ACUTE PYELONEPHRITIS: ICD-10-CM

## 2025-05-15 DIAGNOSIS — D64.9 LOW HEMOGLOBIN: ICD-10-CM

## 2025-05-15 DIAGNOSIS — Z78.9 TRANSITION OF CARE: Primary | ICD-10-CM

## 2025-05-15 DIAGNOSIS — K52.1 DIARRHEA DUE TO DRUG: ICD-10-CM

## 2025-05-15 PROCEDURE — 99496 TRANSJ CARE MGMT HIGH F2F 7D: CPT | Performed by: FAMILY MEDICINE

## 2025-05-15 PROCEDURE — 87493 C DIFF AMPLIFIED PROBE: CPT

## 2025-05-15 NOTE — ASSESSMENT & PLAN NOTE
- Crt trending down and on day of discharge = 3.04 <-- 3.38 <-- 3.48 <-- 3.59  - GFR = 15  - has been hydrating with water vs Iced-Tea  - has repeat labs to be drawn next week   - close Nephro f/u - pt aware and agreeable

## 2025-05-15 NOTE — PROGRESS NOTES
Transition of Care Visit:  Name: Ragini Melendez      : 1955      MRN: [id number]  Encounter Provider: Emely Yost DO  Encounter Date: 5/15/2025   Encounter department: [facility]    Assessment & Plan  Transition of care  - reviewed hospitalization records - bacteremia 2/2 acute pyelo, MONROE superimposed on CKD4 and obstructed nephrostomy tube   - hyponatremia resolved  - hypomagnesemia resolved        Acute pyelonephritis  - Culture grew Klebsiella pneumoniae and pt was switched to Augmentin and advised to cont for an additional 9 days post discharge for a total of 14days on abx        Diarrhea due to drug  - Culture grew Klebsiella pneumoniae and pt was switched to Augmentin and advised to cont for an additional 9 days post discharge for a total of 14days on abx   - has been taking Augmentin on empty stomach   - (+) diarrhea - which was also noted while pt was admitted   - advised limited use of Imodium   - advised to take abx with food and to incorporate a probiotic   - no F/C/N/V/abd pain/foul smelling diarrhea - but will check for Cdiff as per pt request   Orders:    Clostridioides difficile toxin by PCR with EIA; Future    Obstruction of nephrostomy tube (HCC)  - per IR, left nephrostomy tube was completely blocked due to stones   - b/l tubes were changed in-house and advised to have replaced again in 6wks        MONROE (acute kidney injury) (HCC)  - Crt trending down and on day of discharge = 3.04 <-- 3.38 <-- 3.48 <-- 3.59  - GFR = 15  - has been hydrating with water vs Iced-Tea  - has repeat labs to be drawn next week   - close Nephro f/u - pt aware and agreeable        Low hemoglobin  - Hb 9.6   - will recheck             History of Present Illness     Transitional Care Management Review:   Ragini Melendez is a 69 y.o. female here for TCM follow up.     During the TCM phone call patient stated:  TCM Call (since 2025)       Date and time call was made  2025  1:14 PM     Hospital care reviewed  Records reviewed    Patient was hospitialized at  [facility]    Date of Admission  05/06/25    Date of discharge  05/11/25    Disposition  Home    Were the patients medications reviewed and updated  Yes    Current Symptoms  None          TCM Call (since 5/1/2025)       Post hospital issues  None    Scheduled for follow up?  Yes    Did you obtain your prescribed medications  Yes    Do you need help managing your prescriptions or medications  No    Is transportation to your appointment needed  No    I have advised the patient to call PCP with any new or worsening symptoms  Radha Arreguin MA    Living Arrangements  Family members    Are you recieving home care services  No          HPI  68yo F presents to the office for TCM   - pt was admitted to [facility] from 5/6-11/2025 for treatment of acute pyelo and sepsis     Hospital Course:   Ragini Melendez is a 69 y.o. female patient who originally presented to the hospital on 5/6/2025 due to left-sided flank pain. The patient has history of urostomy tube and bilateral nephrostomy tube with ileal conduit. Work-up in the emergency department showed findings of mild left hydronephrosis without hydroureter. There was moderate left perinephric inflammatory fat stranding without fluid collection, suggestive of pyelonephritis. Presentation was concerning for sepsis secondary to obstructive uropathy and pyelonephritis, thus IR was consulted for bilateral nephrostomy tube exchange on 5/7, which showed left retrograde nephroureteral catheter was completely occluded with stones, explaining its dysfunction. The percutaneous nephrostomy tubes were exchanged without complications. The patient was additionally found to have blood cultures positive for Klebsiella pneumoniae and anerobic growth with Bacteroides pyogenes, and was treated with IV antibiotics initially with Zosyn. This was later transitioned to Augmentin on 5/9 based on susceptibility results  "from the blood cultures. Nephrology was consulted due to acute kidney injury. She was treated with sodium bicarbonate infusion due to worsening metabolic acidosis, which was transitioned to Isolyte and 0.45% normal saline due to elevated sodium levels. The patients creatinine had improved on a daily basis following nephrostomy tube exchange, and she was deemed stable for discharge on 5/11 with follow-up BMP in the outpatient setting coordinated by nephrology. She will complete a total 14 day course of antibiotics with Augmentin on discharge.    - has been taking Augmentin on an empty stomach - \"explosive diarrhea\" - had a bout at 3am and then again at 7am   - has been taking Imodium QD   - concerned about Cdiff   - denies F/C/N/V/CP/palpitations/SOB/wheezing/abd pain or bloating/bloody stool/foul smelling stool/LE edema   - did speak with Nephro and was advised to get repeat BMP next week   - will be getting Nephrostomy tube changed Q6wks   - has been trying to drink more water and not Iced-tea       Review of Systems as per HPI   Objective   /80   Ht 4' 10\" (1.473 m)   Wt 71.7 kg (158 lb)   LMP  (LMP Unknown)   BMI 33.02 kg/m²     Physical Exam  Vitals reviewed.   Constitutional:       General: She is not in acute distress.     Appearance: Normal appearance. She is not ill-appearing, toxic-appearing or diaphoretic.   HENT:      Head: Normocephalic and atraumatic.      Right Ear: External ear normal.      Left Ear: External ear normal.      Nose: Nose normal.     Eyes:      General: No scleral icterus.        Right eye: No discharge.         Left eye: No discharge.      Extraocular Movements: Extraocular movements intact.      Conjunctiva/sclera: Conjunctivae normal.       Cardiovascular:      Rate and Rhythm: Normal rate and regular rhythm.      Heart sounds: Normal heart sounds.   Pulmonary:      Effort: Pulmonary effort is normal. No respiratory distress.      Breath sounds: Normal breath sounds. No " stridor. No wheezing, rhonchi or rales.   Abdominal:      General: There is no distension.      Palpations: Abdomen is soft.      Tenderness: There is no abdominal tenderness. There is no guarding.     Musculoskeletal:         General: Normal range of motion.      Cervical back: Normal range of motion.      Right lower leg: No edema.      Left lower leg: No edema.     Skin:     General: Skin is warm.     Neurological:      General: No focal deficit present.      Mental Status: She is alert and oriented to person, place, and time.     Psychiatric:         Mood and Affect: Mood normal.         Behavior: Behavior normal.       Medications have been reviewed by provider in current encounter

## 2025-05-16 LAB — C DIFF TOX GENS STL QL NAA+PROBE: NEGATIVE

## 2025-05-19 ENCOUNTER — RESULTS FOLLOW-UP (OUTPATIENT)
Dept: FAMILY MEDICINE CLINIC | Facility: CLINIC | Age: 70
End: 2025-05-19

## 2025-05-19 ENCOUNTER — HOSPITAL ENCOUNTER (OUTPATIENT)
Dept: RADIOLOGY | Facility: MEDICAL CENTER | Age: 70
Discharge: HOME | End: 2025-05-19
Payer: COMMERCIAL

## 2025-05-19 VITALS — WEIGHT: 158 LBS | HEIGHT: 58 IN | BODY MASS INDEX: 33.17 KG/M2

## 2025-05-19 DIAGNOSIS — Z12.31 ENCOUNTER FOR SCREENING MAMMOGRAM FOR BREAST CANCER: ICD-10-CM

## 2025-05-19 PROCEDURE — 77067 SCR MAMMO BI INCL CAD: CPT

## 2025-05-19 PROCEDURE — 77063 BREAST TOMOSYNTHESIS BI: CPT

## 2025-05-19 NOTE — TELEPHONE ENCOUNTER
----- Message from Emely Yost DO sent at 5/19/2025 12:14 PM EDT -----  Neg for Cdiff   ----- Message -----  From: Lab, Background User  Sent: 5/16/2025  11:26 AM EDT  To: Emely Yost DO

## 2025-05-20 ENCOUNTER — RESULTS FOLLOW-UP (OUTPATIENT)
Dept: NEPHROLOGY | Facility: CLINIC | Age: 70
End: 2025-05-20

## 2025-05-20 ENCOUNTER — APPOINTMENT (OUTPATIENT)
Dept: LAB | Facility: CLINIC | Age: 70
End: 2025-05-20
Payer: COMMERCIAL

## 2025-05-20 DIAGNOSIS — D63.1 ANEMIA DUE TO STAGE 4 CHRONIC KIDNEY DISEASE  (HCC): Primary | ICD-10-CM

## 2025-05-20 DIAGNOSIS — N18.4 ANEMIA DUE TO STAGE 4 CHRONIC KIDNEY DISEASE  (HCC): Primary | ICD-10-CM

## 2025-05-20 DIAGNOSIS — N17.9 ACUTE KIDNEY INJURY (HCC): ICD-10-CM

## 2025-05-20 DIAGNOSIS — N18.4 STAGE 4 CHRONIC KIDNEY DISEASE (HCC): ICD-10-CM

## 2025-05-20 DIAGNOSIS — A41.9 SEPSIS, DUE TO UNSPECIFIED ORGANISM, UNSPECIFIED WHETHER ACUTE ORGAN DYSFUNCTION PRESENT (HCC): ICD-10-CM

## 2025-05-20 DIAGNOSIS — N18.4 CKD (CHRONIC KIDNEY DISEASE) STAGE 4, GFR 15-29 ML/MIN (HCC): ICD-10-CM

## 2025-05-20 DIAGNOSIS — Z13.220 SCREENING CHOLESTEROL LEVEL: ICD-10-CM

## 2025-05-20 LAB
ANION GAP SERPL CALCULATED.3IONS-SCNC: 12 MMOL/L (ref 4–13)
ANISOCYTOSIS BLD QL SMEAR: PRESENT
BASOPHILS # BLD MANUAL: 0 THOUSAND/UL (ref 0–0.1)
BASOPHILS NFR MAR MANUAL: 0 % (ref 0–1)
BUN SERPL-MCNC: 45 MG/DL (ref 5–25)
CALCIUM SERPL-MCNC: 9.1 MG/DL (ref 8.4–10.2)
CHLORIDE SERPL-SCNC: 108 MMOL/L (ref 96–108)
CHOLEST SERPL-MCNC: 166 MG/DL (ref ?–200)
CO2 SERPL-SCNC: 21 MMOL/L (ref 21–32)
CREAT SERPL-MCNC: 2.42 MG/DL (ref 0.6–1.3)
EOSINOPHIL # BLD MANUAL: 0.27 THOUSAND/UL (ref 0–0.4)
EOSINOPHIL NFR BLD MANUAL: 2 % (ref 0–6)
ERYTHROCYTE [DISTWIDTH] IN BLOOD BY AUTOMATED COUNT: 13.5 % (ref 11.6–15.1)
GFR SERPL CREATININE-BSD FRML MDRD: 20 ML/MIN/1.73SQ M
GLUCOSE P FAST SERPL-MCNC: 98 MG/DL (ref 65–99)
HCT VFR BLD AUTO: 37.1 % (ref 34.8–46.1)
HDLC SERPL-MCNC: 73 MG/DL
HGB BLD-MCNC: 11.3 G/DL (ref 11.5–15.4)
LDLC SERPL CALC-MCNC: 75 MG/DL (ref 0–100)
LYMPHOCYTES # BLD AUTO: 1.2 THOUSAND/UL (ref 0.6–4.47)
LYMPHOCYTES # BLD AUTO: 9 % (ref 14–44)
MCH RBC QN AUTO: 29.2 PG (ref 26.8–34.3)
MCHC RBC AUTO-ENTMCNC: 30.5 G/DL (ref 31.4–37.4)
MCV RBC AUTO: 96 FL (ref 82–98)
MONOCYTES # BLD AUTO: 0.53 THOUSAND/UL (ref 0–1.22)
MONOCYTES NFR BLD: 4 % (ref 4–12)
NEUTROPHILS # BLD MANUAL: 11.32 THOUSAND/UL (ref 1.85–7.62)
NEUTS BAND NFR BLD MANUAL: 1 % (ref 0–8)
NEUTS SEG NFR BLD AUTO: 84 % (ref 43–75)
NONHDLC SERPL-MCNC: 93 MG/DL
PLATELET # BLD AUTO: 403 THOUSANDS/UL (ref 149–390)
PLATELET BLD QL SMEAR: ADEQUATE
PLATELET CLUMP BLD QL SMEAR: PRESENT
PMV BLD AUTO: 11.2 FL (ref 8.9–12.7)
POTASSIUM SERPL-SCNC: 4 MMOL/L (ref 3.5–5.3)
RBC # BLD AUTO: 3.87 MILLION/UL (ref 3.81–5.12)
RBC MORPH BLD: PRESENT
SODIUM SERPL-SCNC: 141 MMOL/L (ref 135–147)
TRIGL SERPL-MCNC: 91 MG/DL (ref ?–150)
WBC # BLD AUTO: 13.32 THOUSAND/UL (ref 4.31–10.16)

## 2025-05-20 PROCEDURE — 85007 BL SMEAR W/DIFF WBC COUNT: CPT | Performed by: FAMILY MEDICINE

## 2025-05-20 PROCEDURE — 36415 COLL VENOUS BLD VENIPUNCTURE: CPT

## 2025-05-20 PROCEDURE — 80061 LIPID PANEL: CPT

## 2025-05-20 PROCEDURE — 80048 BASIC METABOLIC PNL TOTAL CA: CPT

## 2025-05-20 PROCEDURE — 85027 COMPLETE CBC AUTOMATED: CPT | Performed by: FAMILY MEDICINE

## 2025-05-21 NOTE — TELEPHONE ENCOUNTER
Left a message about recent labs show kidney function is good going back to baseline. Labs in 1 month . No changes . Labs in the system .              ----- Message from Wade Villalobos MD sent at 5/20/2025  5:44 PM EDT -----  Please let the patient know that her kidney function is improving and currently approaching back to her baseline.  She should repeat BMP in 1 month.  Thank you  ----- Message -----  From: Lab, Background User  Sent: 5/20/2025   5:43 PM EDT  To: Wade Villalobos MD

## 2025-05-23 ENCOUNTER — RESULTS FOLLOW-UP (OUTPATIENT)
Dept: FAMILY MEDICINE CLINIC | Facility: CLINIC | Age: 70
End: 2025-05-23

## 2025-05-25 ENCOUNTER — HOSPITAL ENCOUNTER (EMERGENCY)
Facility: HOSPITAL | Age: 70
Discharge: HOME | End: 2025-05-25
Attending: EMERGENCY MEDICINE | Admitting: EMERGENCY MEDICINE
Payer: COMMERCIAL

## 2025-05-25 VITALS
OXYGEN SATURATION: 100 % | RESPIRATION RATE: 16 BRPM | DIASTOLIC BLOOD PRESSURE: 68 MMHG | SYSTOLIC BLOOD PRESSURE: 122 MMHG | TEMPERATURE: 98.2 F | HEART RATE: 79 BPM

## 2025-05-25 DIAGNOSIS — N99.528 COMPLICATION OF UROSTOMY (HCC): Primary | ICD-10-CM

## 2025-05-25 PROCEDURE — 99284 EMERGENCY DEPT VISIT MOD MDM: CPT | Performed by: EMERGENCY MEDICINE

## 2025-05-25 PROCEDURE — 99283 EMERGENCY DEPT VISIT LOW MDM: CPT

## 2025-05-25 RX ORDER — SULFAMETHOXAZOLE AND TRIMETHOPRIM 800; 160 MG/1; MG/1
1 TABLET ORAL ONCE
Status: DISCONTINUED | OUTPATIENT
Start: 2025-05-25 | End: 2025-05-25

## 2025-05-25 NOTE — ED PROVIDER NOTES
Time reflects when diagnosis was documented in both MDM as applicable and the Disposition within this note       Time User Action Codes Description Comment    5/25/2025 12:40 PM Marina Hemphill Add [N99.528] Complication of urostomy (HCC)           ED Disposition       ED Disposition   Discharge    Condition   Stable    Date/Time   Sun May 25, 2025 12:40 PM    Comment   Ragini Al discharge to home/self care.                   Assessment & Plan       Medical Decision Making    Discussed with urology, RAFITA Simpson, recommending wound care/RN to replace the bag with the appropriate bagging as patient had run out of her normal ostomy bags.  This is likely causing the leakage and it is irritating the skin around the ostomy site.  Ostomy bag was replaced and adhered properly to the skin, the rash does not appear to be cellulitic, patient has an appointment coming up with her ostomy care team on Thursday.  However she will try to call Tuesday to see them earlier.  No further leakage.  She was provided with extra supplies.  Return precautions were discussed.         Medications - No data to display    ED Risk Strat Scores                    No data recorded                            History of Present Illness       Chief Complaint   Patient presents with    Ostomy Problem     Pt presents to the ED with reports of ostomy discomfort. Pt reports developing rash over site causing difficulty with the bag adhering to the skin.        Past Medical History[1]   Past Surgical History[2]   Family History[3]   Social History[4]   E-Cigarette/Vaping    E-Cigarette Use Never User       E-Cigarette/Vaping Substances    Nicotine No     THC No     CBD No     Flavoring No     Other No     Unknown No       I have reviewed and agree with the history as documented.     69-year-old female presenting to the emergency department with history of bilateral nephrostomy tubes and ileal conduit.  Has nephrostomy tube into ostomy bag that  is leaking.  States has been leaking for 2 weeks.  Even before that she has had a rash developing in the area, states it is itchy but not painful.  No fevers or chills.  No nausea or vomiting.  No abdominal pain.  Urine has been clear.  She has been having difficulty adhering the ostomy bags.  States her surgeon is Dr. Kruse.  She has had this ostomy since last year October.        Review of Systems   Constitutional:  Negative for chills and fever.   Respiratory:  Negative for cough and shortness of breath.    Gastrointestinal:  Negative for abdominal pain, nausea and vomiting.   Skin:  Positive for color change.           Objective       ED Triage Vitals [05/25/25 1048]   Temperature Pulse Blood Pressure Respirations SpO2 Patient Position - Orthostatic VS   98.2 °F (36.8 °C) 79 122/68 16 100 % Sitting      Temp Source Heart Rate Source BP Location FiO2 (%) Pain Score    Oral Monitor Right arm -- 4      Vitals      Date and Time Temp Pulse SpO2 Resp BP Pain Score FACES Pain Rating User   05/25/25 1048 98.2 °F (36.8 °C) 79 100 % 16 122/68 4 -- HVL            Physical Exam  Constitutional:       General: She is not in acute distress.  HENT:      Head: Normocephalic and atraumatic.      Nose: Nose normal.      Mouth/Throat:      Mouth: Mucous membranes are moist.     Eyes:      Conjunctiva/sclera: Conjunctivae normal.      Pupils: Pupils are equal, round, and reactive to light.       Cardiovascular:      Rate and Rhythm: Normal rate.   Pulmonary:      Effort: Pulmonary effort is normal. No respiratory distress.   Abdominal:      General: There is no distension.      Palpations: Abdomen is soft.      Tenderness: There is no abdominal tenderness.      Comments: Nephrostomy tube draining into ostomy bag is noted, there is leakage from the bag, skin irritation at the ostomy site, somewhat erythematous but not warm, it is not painful, no purulence.     Musculoskeletal:         General: Normal range of motion.      Cervical  back: Normal range of motion.     Skin:     General: Skin is warm.     Neurological:      Mental Status: She is alert and oriented to person, place, and time. Mental status is at baseline.         Results Reviewed       None            No orders to display       Procedures    ED Medication and Procedure Management   Prior to Admission Medications   Prescriptions Last Dose Informant Patient Reported? Taking?   ARIPiprazole (ABILIFY) 20 MG tablet  Self Yes No   Sig: Take 20 mg by mouth every morning   Calcium Carbonate (CALCIUM 500 PO)  Self Yes No   Sig: Take 500 mg by mouth in the morning   Calcium-Magnesium-Vitamin D (CALCIUM 500 PO)  Self Yes No   Sig: Take 1 capsule by mouth daily at bedtime   Multiple Vitamin (MULTIVITAMIN) tablet  Self Yes No   Sig: Take 1 tablet by mouth every morning   SODIUM CHLORIDE, EXTERNAL, 0.9 % SOLN  Self Yes No   Sig: 10 mL by Intracatheter route in the morning. Intracatheter flushing daily. May substitute prefilled syringe with normal saline 10 mL vials, 10 mL syringes, and 18 g blunt needles.   acetaminophen (TYLENOL) 325 mg tablet  Self No No   Sig: Take 2 tablets (650 mg total) by mouth every 6 (six) hours as needed for mild pain   calcitriol (ROCALTROL) 0.25 mcg capsule  Self No No   Sig: Take 1 capsule (0.25 mcg total) by mouth daily   calcium acetate (PHOSLO) capsule  Self No No   Sig: Take 2 capsules (1,334 mg total) by mouth 2 (two) times a day 2 capsules with lunch, 2 capsules with dinner   cetirizine (ZyrTEC) 10 mg tablet   No No   Sig: Take 1 tablet (10 mg total) by mouth daily   cholecalciferol (VITAMIN D3) 1,000 units tablet  Self No No   Sig: Take 4 tablets (4,000 Units total) by mouth daily   folic acid (FOLVITE) 1 mg tablet  Self No No   Sig: Take 1 tablet (1 mg total) by mouth daily   omeprazole (PriLOSEC) 20 mg delayed release capsule  Self Yes No   Sig: Take 20 mg by mouth as needed (GERD)   venlafaxine (EFFEXOR-XR) 75 mg 24 hr capsule  Self Yes No   Sig: Take by  mouth      Facility-Administered Medications: None     Discharge Medication List as of 5/25/2025 12:41 PM        CONTINUE these medications which have NOT CHANGED    Details   acetaminophen (TYLENOL) 325 mg tablet Take 2 tablets (650 mg total) by mouth every 6 (six) hours as needed for mild pain, Starting Wed 10/30/2024, No Print      ARIPiprazole (ABILIFY) 20 MG tablet Take 20 mg by mouth every morning, Starting Thu 2/10/2022, Historical Med      calcitriol (ROCALTROL) 0.25 mcg capsule Take 1 capsule (0.25 mcg total) by mouth daily, Starting Tue 2/25/2025, Until Mon 5/26/2025, Normal      calcium acetate (PHOSLO) capsule Take 2 capsules (1,334 mg total) by mouth 2 (two) times a day 2 capsules with lunch, 2 capsules with dinner, Starting Tue 2/25/2025, Until Fri 2/20/2026, Normal      !! Calcium Carbonate (CALCIUM 500 PO) Take 500 mg by mouth in the morning, Historical Med      !! Calcium-Magnesium-Vitamin D (CALCIUM 500 PO) Take 1 capsule by mouth daily at bedtime, Historical Med      cetirizine (ZyrTEC) 10 mg tablet Take 1 tablet (10 mg total) by mouth daily, Starting Mon 5/12/2025, Normal      cholecalciferol (VITAMIN D3) 1,000 units tablet Take 4 tablets (4,000 Units total) by mouth daily, Starting Wed 2/1/2023, Normal      folic acid (FOLVITE) 1 mg tablet Take 1 tablet (1 mg total) by mouth daily, Starting Tue 10/8/2019, Print      Multiple Vitamin (MULTIVITAMIN) tablet Take 1 tablet by mouth every morning, Historical Med      omeprazole (PriLOSEC) 20 mg delayed release capsule Take 20 mg by mouth as needed (GERD), Historical Med      SODIUM CHLORIDE, EXTERNAL, 0.9 % SOLN 10 mL by Intracatheter route in the morning. Intracatheter flushing daily. May substitute prefilled syringe with normal saline 10 mL vials, 10 mL syringes, and 18 g blunt needles., Historical Med      venlafaxine (EFFEXOR-XR) 75 mg 24 hr capsule Take by mouth, Historical Med       !! - Potential duplicate medications found. Please discuss with  provider.        No discharge procedures on file.  ED SEPSIS DOCUMENTATION   Time reflects when diagnosis was documented in both MDM as applicable and the Disposition within this note       Time User Action Codes Description Comment    5/25/2025 12:40 PM Marina Hemphill Add [N99.528] Complication of urostomy (HCC)                      [1]   Past Medical History:  Diagnosis Date    Anemia     Anxiety     Cancer (HCC)     ENDOMETRIAL    Chemotherapy induced neutropenia (HCC) 08/30/2019    Chronic kidney disease     CKD (chronic kidney disease) stage 3, GFR 30-59 ml/min (HCC)     COVID     Fall 2022    Depression     DVT (deep venous thrombosis) (HCC) 07/19/2019    RIGHT LEG    Endometrial cancer (HCC) 12/2017    GERD (gastroesophageal reflux disease)     H/O gastric bypass     Hard to intubate     pt denies AS OF 7/25/23    History of chemotherapy     started 12/2021endometrial cancer- done 12/23/22    History of DVT in adulthood     RLE    History of transfusion 04/13/2023    Hyperglycemia     vx type 2 dm -- last assessed 4/1/14; resolved 11/7/17    Hyperlipidemia     Hypertension     in past- no meds at present    Iron deficiency anemia     iron infusions weekly    Iron deficiency anemia secondary to inadequate dietary iron intake 02/08/2023    OAB (overactive bladder)     Port-A-Cath in place     Wears glasses    [2]   Past Surgical History:  Procedure Laterality Date    ABDOMINAL SURGERY      GASTRIC BYPASS; 2001    BREAST BIOPSY Right 06/28/2019    core biopsy; benign    CHOLECYSTECTOMY      at the time of gastric bypass    COLONOSCOPY      CT NEEDLE BIOPSY LYMPH NODE  07/08/2019    CYSTECTOMY, RADICAL WITH ILEOCONDUIT N/A 10/24/2024    Procedure: ILEAL CONDUIT urinary diversion (No cystectomy), BILATERAL UTERO-LYSIS;  Surgeon: Demetrius Lowe MD;  Location: [facility];  Service: Urology    FL GUIDED CENTRAL VENOUS ACCESS DEVICE INSERTION  11/12/2019    FL RETROGRADE PYELOGRAM  03/30/2023    FL RETROGRADE  PYELOGRAM  05/09/2023    FL RETROGRADE PYELOGRAM  8/1/2023    GASTRIC BYPASS      HYSTERECTOMY Bilateral 2017    total abdominal with salpingo-oophorectomy    IR NEPHROSTOMY TO NEPHROURETERAL STENT  06/09/2022    IR NEPHROSTOMY TO NEPHROURETERAL STENT  12/20/2022    IR NEPHROSTOMY TO NEPHROURETERAL STENT  8/8/2023    IR NEPHROSTOMY TO NEPHROURETERAL STENT  12/4/2024    IR NEPHROSTOMY TO NEPHROURETERAL STENT  3/6/2025    IR NEPHROSTOMY TO NEPHROURETERAL STENT  5/7/2025    IR NEPHROSTOMY TUBE CHECK/CHANGE/REPOSITION/REINSERTION/UPSIZE  05/27/2022    IR NEPHROSTOMY TUBE CHECK/CHANGE/REPOSITION/REINSERTION/UPSIZE  11/10/2023    IR NEPHROSTOMY TUBE CHECK/CHANGE/REPOSITION/REINSERTION/UPSIZE  2/9/2024    IR NEPHROSTOMY TUBE CHECK/CHANGE/REPOSITION/REINSERTION/UPSIZE  4/12/2024    IR NEPHROSTOMY TUBE CHECK/CHANGE/REPOSITION/REINSERTION/UPSIZE  7/12/2024    IR NEPHROSTOMY TUBE CHECK/CHANGE/REPOSITION/REINSERTION/UPSIZE  10/16/2024    IR NEPHROSTOMY TUBE CHECK/CHANGE/REPOSITION/REINSERTION/UPSIZE  11/6/2024    IR NEPHROSTOMY TUBE PLACEMENT  07/26/2019    IR NEPHROSTOMY TUBE PLACEMENT  05/27/2023    IR NEPHROURETERAL STENT CHECK/CHANGE/REPOSITION  04/06/2021    IR NEPHROURETERAL STENT CHECK/CHANGE/REPOSITION  06/04/2021    IR NEPHROURETERAL STENT CHECK/CHANGE/REPOSITION  09/03/2021    IR NEPHROURETERAL STENT CHECK/CHANGE/REPOSITION  12/07/2021    IR NEPHROURETERAL STENT CHECK/CHANGE/REPOSITION  03/08/2022    IR NEPHROURETERAL STENT CHECK/CHANGE/REPOSITION  05/10/2022    IR NEPHROURETERAL STENT CHECK/CHANGE/REPOSITION  09/19/2022    IR PICC LINE  09/27/2019    IR PORT PLACEMENT  07/26/2019    IR PORT PLACEMENT      IR PORT REMOVAL  09/20/2019    OOPHORECTOMY Bilateral 2017    RI CYSTO W/INSERT URETERAL STENT Right 03/30/2023    Procedure: EXCHANGE STENT URETERAL;  Surgeon: Demetrius Lowe MD;  [facility]: [facility];  Service: Urology    RI CYSTOURETHROSCOPY W/URETERAL CATHETERIZATION Right 03/30/2023    Procedure:  CYSTOSCOPY RETROGRADE PYELOGRAM WITH exchange of STENT URETERAL;  Surgeon: Demetrius Lowe MD;  [facility];  Service: Urology    DC CYSTOURETHROSCOPY W/URETERAL CATHETERIZATION Bilateral 05/09/2023    Procedure: CYSTOSCOPY, BILATERAL RETROGRADE PYELOGRAM, WITH LEFT INSERTION STENT URETERAL, RIGHT URTERAL STENT EXCHANGE;  Surgeon: Nicola Hannah MD;  [facility];  Service: Urology    DC CYSTOURETHROSCOPY W/URETERAL CATHETERIZATION Bilateral 8/1/2023    Procedure: CYSTOSCOPY BILATERAL RETROGRADE  WITH REMOVAL BILATERAL  STENT URETERAL;  Surgeon: Demetrius Lowe MD;  [facility];  Service: Urology    DC INSJ TUNNELED CTR VAD W/SUBQ PORT AGE 7 YR/> Left 11/12/2019    Procedure: INSERTION VENOUS PORT ( PORT-A-CATH) IR;  Surgeon: Edilberto Guzman DO;  [facility];  Service: Interventional Radiology    DC LAPS ABD PRTM&OMENTUM DX W/WO SPEC BR/WA SPX N/A 12/19/2017    Procedure: LAPAROSCOPY DIAGNOSTIC;  Surgeon: Evgeny So MD;  [facility]: [facility];  Service: Gynecology Oncology    DC LAPS W/RAD HYST W/BILAT LMPHADEC RMVL TUBE/OVARY N/A 12/19/2017    Procedure: HYSTERECTOMY LAPAROSCOPIC TOTAL (LTH) W/ ROBOTICS; BILATERAL SALPINGOOPHERECTOMY; LYMPH NODE DISSECTION; lysis of adhesions;  Surgeon: Evgeny So MD;  [facility];  Service: Gynecology Oncology    REMOVAL URETERAL STENT Bilateral 8/1/2023    Procedure: REMOVAL STENT URETERAL;  Surgeon: Demetrius Lowe MD;  [facility];  Service: Urology    TONSILLECTOMY      US GUIDED BREAST BIOPSY RIGHT COMPLETE Right 06/28/2019   [3]   Family History  Problem Relation Name Age of Onset    Hyperlipidemia Mother Emiliana     Heart disease Mother Emiliana     Ovarian cancer Mother Emiliana 50    Colon cancer Mother Emiliana     Lymphoma Father Car     Breast cancer Sister Nahomi 63    No Known Problems Brother Fernando     No Known Problems Brother Car     No Known Problems Maternal  Grandmother      Bone cancer Maternal Grandfather      Uterine cancer Paternal Grandmother      No Known Problems Paternal Grandfather      No Known Problems Maternal Aunt gissell gould     No Known Problems Maternal Aunt gentry     No Known Problems Maternal Aunt virginia     No Known Problems Maternal Aunt hugh     No Known Problems Paternal Aunt ofelia     No Known Problems Paternal Aunt gissell     No Known Problems Paternal Aunt jd     No Known Problems Paternal Aunt laureen    [4]   Social History  Tobacco Use    Smoking status: Never    Smokeless tobacco: Never   Vaping Use    Vaping status: Never Used   Substance Use Topics    Alcohol use: Never    Drug use: Never        Marina Hemphill DO  05/25/25 8799

## 2025-05-25 NOTE — DISCHARGE INSTRUCTIONS
Follow-up with ostomy care team as scheduled on Thursday.    Return to the emergency department for any new or worsening symptoms.

## 2025-05-27 ENCOUNTER — VBI (OUTPATIENT)
Dept: FAMILY MEDICINE CLINIC | Facility: CLINIC | Age: 70
End: 2025-05-27

## 2025-05-27 ENCOUNTER — HOSPITAL ENCOUNTER (EMERGENCY)
Facility: HOSPITAL | Age: 70
Discharge: HOME | End: 2025-05-27
Attending: EMERGENCY MEDICINE | Admitting: EMERGENCY MEDICINE
Payer: COMMERCIAL

## 2025-05-27 VITALS
DIASTOLIC BLOOD PRESSURE: 73 MMHG | SYSTOLIC BLOOD PRESSURE: 135 MMHG | HEART RATE: 75 BPM | RESPIRATION RATE: 18 BRPM | TEMPERATURE: 98.3 F | OXYGEN SATURATION: 97 %

## 2025-05-27 DIAGNOSIS — N13.30 BILATERAL HYDRONEPHROSIS: Primary | ICD-10-CM

## 2025-05-27 DIAGNOSIS — Z93.6 NEPHROSTOMY STATUS (HCC): ICD-10-CM

## 2025-05-27 DIAGNOSIS — Z71.89 ENCOUNTER FOR OSTOMY CARE EDUCATION: ICD-10-CM

## 2025-05-27 DIAGNOSIS — Z43.3 COLOSTOMY CARE (HCC): Primary | ICD-10-CM

## 2025-05-27 PROCEDURE — 99283 EMERGENCY DEPT VISIT LOW MDM: CPT

## 2025-05-27 PROCEDURE — 99283 EMERGENCY DEPT VISIT LOW MDM: CPT | Performed by: EMERGENCY MEDICINE

## 2025-05-27 NOTE — TELEPHONE ENCOUNTER
05/27/25 9:44 AM    Patient contacted post ED visit, [facility] department spoke with patient/caregiver and outreach was successful.    Thank you.  Patience Magana  [facility]

## 2025-05-27 NOTE — ED PROVIDER NOTES
Time reflects when diagnosis was documented in both MDM as applicable and the Disposition within this note       Time User Action Codes Description Comment    5/27/2025  6:40 PM Renny Valentin Add [Z43.3] Colostomy care ([facility])     5/27/2025  6:40 PM Renny Valentin Add [Z71.89] Encounter for ostomy care education           ED Disposition       ED Disposition   Discharge    Condition   Stable    Date/Time   Tue May 27, 2025  6:40 PM    Comment   Ragini Melendez discharge to home/self care.                   Assessment & Plan       Medical Decision Making        Initial ED assessment:   69-year-old female, out of ostomy supplies, came here for bag change.  Leaking around her ileal conduit    Pathology at risk for includes but is not limited to:    Normal-appearing ileal conduit appears to be functioning well no signs of skin infection no signs of ostomy failure    Initial ED plan:    Will change bag out.  Will give patient an extra bag for home.  Will discharge.                Medications - No data to display    ED Risk Strat Scores                    No data recorded                            History of Present Illness       Chief Complaint   Patient presents with    Ostomy Problem     Pt states her ostomy is leaking and she has no more bags. States she see wound care on Thursday, but is here for a new ostomy bag. Reports some irration around the ostomy from leaking fluid.        Past Medical History[1]   Past Surgical History[2]   Family History[3]   Social History[4]   E-Cigarette/Vaping    E-Cigarette Use Never User       E-Cigarette/Vaping Substances    Nicotine No     THC No     CBD No     Flavoring No     Other No     Unknown No       I have reviewed and agree with the history as documented.     69-year-old female has a ileal conduit.,  States she has been leaking around her ostomy site.  Unfortunately does not have any ostomy supplies.  So she just had the ileal conduit leaking into her diaper.  Denies any  abdominal pain.          Review of Systems   Constitutional:  Negative for activity change, chills, diaphoresis and fever.   HENT:  Negative for congestion, sinus pressure and sore throat.    Eyes:  Negative for pain and visual disturbance.   Respiratory:  Negative for cough, chest tightness, shortness of breath, wheezing and stridor.    Cardiovascular:  Negative for chest pain and palpitations.   Gastrointestinal:  Negative for abdominal distention, abdominal pain, constipation, diarrhea, nausea and vomiting.   Genitourinary:  Negative for dysuria and frequency.   Musculoskeletal:  Negative for neck pain and neck stiffness.   Skin:  Negative for rash.   Neurological:  Negative for dizziness, speech difficulty, light-headedness, numbness and headaches.           Objective       ED Triage Vitals [05/27/25 1713]   Temperature Pulse Blood Pressure Respirations SpO2 Patient Position - Orthostatic VS   98.3 °F (36.8 °C) 75 135/73 18 97 % Sitting      Temp Source Heart Rate Source BP Location FiO2 (%) Pain Score    Oral Monitor Right arm -- --      Vitals      Date and Time Temp Pulse SpO2 Resp BP Pain Score FACES Pain Rating User   05/27/25 1713 98.3 °F (36.8 °C) 75 97 % 18 135/73 -- -- AW            Physical Exam  Vitals reviewed.   Constitutional:       General: She is not in acute distress.     Appearance: She is well-developed. She is not diaphoretic.   HENT:      Head: Normocephalic and atraumatic.      Right Ear: External ear normal.      Left Ear: External ear normal.      Nose: Nose normal.     Eyes:      General:         Right eye: No discharge.         Left eye: No discharge.      Pupils: Pupils are equal, round, and reactive to light.     Neck:      Trachea: No tracheal deviation.     Cardiovascular:      Rate and Rhythm: Normal rate and regular rhythm.      Heart sounds: Normal heart sounds. No murmur heard.  Pulmonary:      Effort: Pulmonary effort is normal. No respiratory distress.      Breath sounds:  Normal breath sounds. No stridor.   Abdominal:      General: There is no distension.      Palpations: Abdomen is soft.      Tenderness: There is no abdominal tenderness. There is no guarding or rebound.      Comments: Ileal conduit right side of abdomen.  Ostomy looks normal.     Musculoskeletal:         General: Normal range of motion.      Cervical back: Normal range of motion and neck supple.     Skin:     General: Skin is warm and dry.      Coloration: Skin is not pale.      Findings: No erythema.     Neurological:      General: No focal deficit present.      Mental Status: She is alert and oriented to person, place, and time.         Results Reviewed       None            No orders to display       Procedures    ED Medication and Procedure Management   Prior to Admission Medications   Prescriptions Last Dose Informant Patient Reported? Taking?   ARIPiprazole (ABILIFY) 20 MG tablet  Self Yes No   Sig: Take 20 mg by mouth every morning   Calcium Carbonate (CALCIUM 500 PO)  Self Yes No   Sig: Take 500 mg by mouth in the morning   Calcium-Magnesium-Vitamin D (CALCIUM 500 PO)  Self Yes No   Sig: Take 1 capsule by mouth daily at bedtime   Multiple Vitamin (MULTIVITAMIN) tablet  Self Yes No   Sig: Take 1 tablet by mouth every morning   SODIUM CHLORIDE, EXTERNAL, 0.9 % SOLN   No No   Sig: 10 mL by Intracatheter route in the morning. Intracatheter flushing daily. May substitute prefilled syringe with normal saline 10 mL vials, 10 mL syringes, and 18 g blunt needles.   acetaminophen (TYLENOL) 325 mg tablet  Self No No   Sig: Take 2 tablets (650 mg total) by mouth every 6 (six) hours as needed for mild pain   calcitriol (ROCALTROL) 0.25 mcg capsule  Self No No   Sig: Take 1 capsule (0.25 mcg total) by mouth daily   calcium acetate (PHOSLO) capsule  Self No No   Sig: Take 2 capsules (1,334 mg total) by mouth 2 (two) times a day 2 capsules with lunch, 2 capsules with dinner   cetirizine (ZyrTEC) 10 mg tablet   No No   Sig:  Take 1 tablet (10 mg total) by mouth daily   cholecalciferol (VITAMIN D3) 1,000 units tablet  Self No No   Sig: Take 4 tablets (4,000 Units total) by mouth daily   folic acid (FOLVITE) 1 mg tablet  Self No No   Sig: Take 1 tablet (1 mg total) by mouth daily   omeprazole (PriLOSEC) 20 mg delayed release capsule  Self Yes No   Sig: Take 20 mg by mouth as needed (GERD)   venlafaxine (EFFEXOR-XR) 75 mg 24 hr capsule  Self Yes No   Sig: Take by mouth      Facility-Administered Medications: None     Patient's Medications   Discharge Prescriptions    No medications on file     No discharge procedures on file.  ED SEPSIS DOCUMENTATION   Time reflects when diagnosis was documented in both MDM as applicable and the Disposition within this note       Time User Action Codes Description Comment    5/27/2025  6:40 PM Renny Valentin [Z43.3] Colostomy care ([facility])     5/27/2025  6:40 PM Renny Valentin [Z71.89] Encounter for ostomy care education                      [1]   Past Medical History:  Diagnosis Date    Anemia     Anxiety     Cancer ([facility])     ENDOMETRIAL    Chemotherapy induced neutropenia ([facility]) 08/30/2019    Chronic kidney disease     CKD (chronic kidney disease) stage 3, GFR 30-59 ml/min ([facility])     COVID     Fall 2022    Depression     DVT (deep venous thrombosis) ([facility]) 07/19/2019    RIGHT LEG    Endometrial cancer ([facility]) 12/2017    GERD (gastroesophageal reflux disease)     H/O gastric bypass     Hard to intubate     pt denies AS OF 7/25/23    History of chemotherapy     started 12/2021endometrial cancer- done 12/23/22    History of DVT in adulthood     RLE    History of transfusion 04/13/2023    Hyperglycemia     vx type 2 dm -- last assessed 4/1/14; resolved 11/7/17    Hyperlipidemia     Hypertension     in past- no meds at present    Iron deficiency anemia     iron infusions weekly    Iron deficiency anemia secondary to inadequate dietary iron intake 02/08/2023    OAB (overactive bladder)     Port-A-Cath in  place     Wears glasses    [2]   Past Surgical History:  Procedure Laterality Date    ABDOMINAL SURGERY      GASTRIC BYPASS; 2001    BREAST BIOPSY Right 06/28/2019    core biopsy; benign    CHOLECYSTECTOMY      at the time of gastric bypass    COLONOSCOPY      CT NEEDLE BIOPSY LYMPH NODE  07/08/2019    CYSTECTOMY, RADICAL WITH ILEOCONDUIT N/A 10/24/2024    Procedure: ILEAL CONDUIT urinary diversion (No cystectomy), BILATERAL UTERO-LYSIS;  Surgeon: Demetrius Lowe MD;  [facility];  Service: Urology    FL GUIDED CENTRAL VENOUS ACCESS DEVICE INSERTION  11/12/2019    FL RETROGRADE PYELOGRAM  03/30/2023    FL RETROGRADE PYELOGRAM  05/09/2023    FL RETROGRADE PYELOGRAM  8/1/2023    GASTRIC BYPASS      HYSTERECTOMY Bilateral 2017    total abdominal with salpingo-oophorectomy    IR NEPHROSTOMY TO NEPHROURETERAL STENT  06/09/2022    IR NEPHROSTOMY TO NEPHROURETERAL STENT  12/20/2022    IR NEPHROSTOMY TO NEPHROURETERAL STENT  8/8/2023    IR NEPHROSTOMY TO NEPHROURETERAL STENT  12/4/2024    IR NEPHROSTOMY TO NEPHROURETERAL STENT  3/6/2025    IR NEPHROSTOMY TO NEPHROURETERAL STENT  5/7/2025    IR NEPHROSTOMY TUBE CHECK/CHANGE/REPOSITION/REINSERTION/UPSIZE  05/27/2022    IR NEPHROSTOMY TUBE CHECK/CHANGE/REPOSITION/REINSERTION/UPSIZE  11/10/2023    IR NEPHROSTOMY TUBE CHECK/CHANGE/REPOSITION/REINSERTION/UPSIZE  2/9/2024    IR NEPHROSTOMY TUBE CHECK/CHANGE/REPOSITION/REINSERTION/UPSIZE  4/12/2024    IR NEPHROSTOMY TUBE CHECK/CHANGE/REPOSITION/REINSERTION/UPSIZE  7/12/2024    IR NEPHROSTOMY TUBE CHECK/CHANGE/REPOSITION/REINSERTION/UPSIZE  10/16/2024    IR NEPHROSTOMY TUBE CHECK/CHANGE/REPOSITION/REINSERTION/UPSIZE  11/6/2024    IR NEPHROSTOMY TUBE PLACEMENT  07/26/2019    IR NEPHROSTOMY TUBE PLACEMENT  05/27/2023    IR NEPHROURETERAL STENT CHECK/CHANGE/REPOSITION  04/06/2021    IR NEPHROURETERAL STENT CHECK/CHANGE/REPOSITION  06/04/2021    IR NEPHROURETERAL STENT CHECK/CHANGE/REPOSITION  09/03/2021    IR  NEPHROURETERAL STENT CHECK/CHANGE/REPOSITION  12/07/2021    IR NEPHROURETERAL STENT CHECK/CHANGE/REPOSITION  03/08/2022    IR NEPHROURETERAL STENT CHECK/CHANGE/REPOSITION  05/10/2022    IR NEPHROURETERAL STENT CHECK/CHANGE/REPOSITION  09/19/2022    IR PICC LINE  09/27/2019    IR PORT PLACEMENT  07/26/2019    IR PORT PLACEMENT      IR PORT REMOVAL  09/20/2019    OOPHORECTOMY Bilateral 2017    ND CYSTO W/INSERT URETERAL STENT Right 03/30/2023    Procedure: EXCHANGE STENT URETERAL;  Surgeon: Demetrius Lowe MD;  [facility];  Service: Urology    ND CYSTOURETHROSCOPY W/URETERAL CATHETERIZATION Right 03/30/2023    Procedure: CYSTOSCOPY RETROGRADE PYELOGRAM WITH exchange of STENT URETERAL;  Surgeon: Demetrius Lowe MD;  [facility];  Service: Urology    ND CYSTOURETHROSCOPY W/URETERAL CATHETERIZATION Bilateral 05/09/2023    Procedure: CYSTOSCOPY, BILATERAL RETROGRADE PYELOGRAM, WITH LEFT INSERTION STENT URETERAL, RIGHT URTERAL STENT EXCHANGE;  Surgeon: Nicola Hannah MD;  [facility];  Service: Urology    ND CYSTOURETHROSCOPY W/URETERAL CATHETERIZATION Bilateral 8/1/2023    Procedure: CYSTOSCOPY BILATERAL RETROGRADE  WITH REMOVAL BILATERAL  STENT URETERAL;  Surgeon: Demetrius Lowe MD;  [facility];  Service: Urology    ND INSJ TUNNELED CTR VAD W/SUBQ PORT AGE 5 YR/> Left 11/12/2019    Procedure: INSERTION VENOUS PORT ( PORT-A-CATH) IR;  Surgeon: Edilberto Guzman DO;  [facility];  Service: Interventional Radiology    ND LAPS ABD PRTM&OMENTUM DX W/WO SPEC BR/WA SPX N/A 12/19/2017    Procedure: LAPAROSCOPY DIAGNOSTIC;  Surgeon: Evgeny So MD;  [facility];  Service: Gynecology Oncology    ND LAPS W/RAD HYST W/BILAT LMPHADEC RMVL TUBE/OVARY N/A 12/19/2017    Procedure: HYSTERECTOMY LAPAROSCOPIC TOTAL (LTH) W/ ROBOTICS; BILATERAL SALPINGOOPHERECTOMY; LYMPH NODE DISSECTION; lysis of adhesions;  Surgeon: Evgeny So MD;  [facility]: [facility];  Service: Gynecology Oncology    REMOVAL URETERAL STENT Bilateral 8/1/2023    Procedure: REMOVAL STENT URETERAL;  Surgeon: Demertius Lowe MD;  [facility];  Service: Urology    TONSILLECTOMY      US GUIDED BREAST BIOPSY RIGHT COMPLETE Right 06/28/2019   [3]   Family History  Problem Relation Name Age of Onset    Hyperlipidemia Mother Emiliana     Heart disease Mother Emiliana     Ovarian cancer Mother Emiliana 50    Colon cancer Mother Emiliana     Lymphoma Father Car     Breast cancer Sister Nahomi 63    No Known Problems Brother Fernando     No Known Problems Brother Car     No Known Problems Maternal Grandmother      Bone cancer Maternal Grandfather      Uterine cancer Paternal Grandmother      No Known Problems Paternal Grandfather      No Known Problems Maternal Aunt chen     No Known Problems Maternal Aunt gentry     No Known Problems Maternal Aunt virginia     No Known Problems Maternal Aunt hugh     No Known Problems Paternal Aunt ofelia     No Known Problems Paternal Aunt gissell     No Known Problems Paternal Aunt jd     No Known Problems Paternal Aunt laureen    [4]   Social History  Tobacco Use    Smoking status: Never    Smokeless tobacco: Never   Vaping Use    Vaping status: Never Used   Substance Use Topics    Alcohol use: Never    Drug use: Never        Renny Valentin, DO  05/27/25 1945       Renny Valentin,   05/27/25 2008

## 2025-05-28 ENCOUNTER — CLINICAL SUPPORT (OUTPATIENT)
Dept: RHEUMATOLOGY | Facility: CLINIC | Age: 70
End: 2025-05-28
Payer: COMMERCIAL

## 2025-05-28 VITALS
OXYGEN SATURATION: 87 % | HEART RATE: 66 BPM | SYSTOLIC BLOOD PRESSURE: 128 MMHG | HEIGHT: 58 IN | DIASTOLIC BLOOD PRESSURE: 90 MMHG | BODY MASS INDEX: 33.13 KG/M2 | WEIGHT: 157.8 LBS

## 2025-05-28 DIAGNOSIS — M80.00XS AGE-RELATED OSTEOPOROSIS WITH CURRENT PATHOLOGICAL FRACTURE, SEQUELA: Primary | ICD-10-CM

## 2025-05-28 PROCEDURE — 96372 THER/PROPH/DIAG INJ SC/IM: CPT

## 2025-05-28 NOTE — PROGRESS NOTES
"Assessment/Plan:    Ragini Melendez came into the [facility] today 05/28/25 to receive Prolia injection.      Verbal consent obtained.  Consent given by: patient    patient states patient has been medically healthy with no underlining concerns/complications.      Ragini Melendez presents with no symptoms today.       All insturctions were reviewed with the patient.    If the patient should have any questions/concerns, advised patient to contacted [facility].       Subjective:     History provided by: patient    Patient ID: Ragini Melendez is a 69 y.o. female      Objective:    Vitals:    05/28/25 0927   Height: 4' 10\" (1.473 m)       Patient tolerated the injection well without any complications.  Injection site/s Upper right arm.  Medication was provided by Office.  Buy and Bill         Patient signed consent form yes   Patient signed ABN form no (If no patient is not a medicare patient).   Patient waited 15 minutes after injection no (This only applies to patient's receiving first time injection).       Last Visit: 3/13/2025  Next visit:Visit date not found      "

## 2025-05-29 ENCOUNTER — OFFICE VISIT (OUTPATIENT)
Facility: HOSPITAL | Age: 70
End: 2025-05-29
Payer: COMMERCIAL

## 2025-05-29 DIAGNOSIS — Z98.890 HISTORY OF ILEAL CONDUIT: Primary | ICD-10-CM

## 2025-05-29 DIAGNOSIS — C54.1 ENDOMETRIAL CANCER (HCC): ICD-10-CM

## 2025-05-29 DIAGNOSIS — Z71.89 ENCOUNTER FOR OSTOMY NURSE CONSULTATION: ICD-10-CM

## 2025-05-29 PROCEDURE — 99213 OFFICE O/P EST LOW 20 MIN: CPT

## 2025-05-29 NOTE — PATIENT INSTRUCTIONS
"Ostomy Care:     -Stoma Measurement: 25mm     -Appliance Used During Bag Change: Coloplast Deep convexity urostomy 00987 samples ordered and pre-cut sent with you   -Accessories Used: Brava barrier strips     Ostomy Care:   1. Peel back pouch using push-pull method, may use non-alcohol adhesive remover(Purple and white package).   2. Use warm water only to cleanse skin around the stoma (alise-stomal skin).   3. Make sure all adhesive residue is removed and skin is dry and not oily.   4. Measure stoma size using measuring guide and trace correct measurements onto back of pouch (Off set opening away from abdominal incision).   5. Then cut backing of pouch out to correct shape/size.   6. Place pouch over stoma and onto skin.   7. Use warmth of hand to apply gentle pressure to help backing of pouch to adhere well to skin.     TIPS:   *Empty pouch when 1/3 -1/2 full.   *Change pouch every 3-4 days or if signs of leaking.   *Can shower with pouch on or off. Make sure to dry off pouch after shower.     Crusting: as needed for moist, red, open skin around the stoma:   (Done prior to pouch application) -Apply stoma powder to skin breakdown, then dust away the excess powder. -Then use skin barrier film to pat/dab over the powder and let dry to form \"crust\" Repeat X2 before placing new ostomy pouch   "

## 2025-05-29 NOTE — PROGRESS NOTES
Patient ID: Ragini Melendez is a 69 y.o. female Date of Birth 1955       Chief Complaint   Patient presents with    Follow Up Wound Care Visit     Peristomal redness and frequent leakage       Allergies:  Cephalosporins    Diagnosis:      Diagnosis ICD-10-CM Associated Orders   1. History of ileal conduit  Z98.890 Wound cleansing and dressings      2. Encounter for ostomy nurse consultation  Z71.89 Wound cleansing and dressings      3. Endometrial cancer (HCC)  C54.1 Wound cleansing and dressings              Assessment & Plan:  Patient seen in office today in conjunction with WOCN.   Recommend for patient to crust the mild skin breakdown to her peristomal skin with stoma powder and Skin-Prep.  Recommend patient to trial 1 piece deep convexity pouch by Coloplast.  WOCN to send samples to patient's home directly from . Samples of new pouch provided to patient today.  No clinical s/s of infection present.   Patient is discharged from ostomy clinic, may follow-up PRN. Call if questions or concerns arise.   Patient verbalized understanding of plan of care.           Subjective:   4/10/25: Patient is a 69 year old female who presents to the ostomy clinic with chief complaint of skin irritation and itching. Patient has a history of ileal conduit creation due to bilateral distal ureteral stricture secondary to endometrial carcinoma. Patient currently is wearing 1 piece flat convatec urostomy pouch, states that she changes the pouch once per week. Denies issues with leaking. Patient reports waxing and waning periods of skin irritation and itching, states that her skin has improved recently. WOCN reports significant washout present to the barrier during removal of ostomy pouch. Patient offers no other stomal or abdominal related complaints. No fever or chills.     5/29/25: Patient presents to the ostomy clinic for evaluation of pouch leaking and peristomal skin breakdown.  Patient states that ever since her  hospitalization for polynephritis the beginning of May she has experienced increased leaking which is caused her skin to become sore.  Patient states that she has been changing her pouch frequently up to multiple times a day.  Patient states that she went to the emergency room twice to obtain more supplies.  Patient reports that her skin actually has improved over the last couple days, states that the patch that the ER recently put on sealed well without leaking.  Patient has been using 1 piece flat ConvaTec pouch, she was supplied with 2 piece flat ConvaTec pouching system from emergency room.  Patient did state that prior to this most recent episode of leaking that she was changing her pouch more frequently as per our recommendations the last time she was at the ostomy clinic and this greatly improved her skin.  Patient states that she feels that she may have lost weight during her hospitalization which may be attributing to her leaking.  Patient offers no other stomal or abdominal related complaints.  No fever or chills.        The following portions of the patient's history were reviewed and updated as appropriate:   Problem List[1]  Past Medical History[2]  Past Surgical History[3]  Family History[4]   Social History[5]   Current Medications[6]    Review of Systems   Constitutional:  Negative for chills and fever.   HENT:  Negative for congestion and sneezing.    Respiratory:  Negative for cough and shortness of breath.    Cardiovascular:  Negative for chest pain.   Gastrointestinal:  Negative for abdominal distention and abdominal pain.   Genitourinary:  Negative for hematuria.   Skin:  Positive for wound. Negative for color change and rash.   Psychiatric/Behavioral:  Negative for agitation.        Objective:  LMP  (LMP Unknown)         Physical Exam  Constitutional:       General: She is awake. She is not in acute distress.     Appearance: She is obese. She is not ill-appearing, toxic-appearing or  diaphoretic.   HENT:      Head: Normocephalic and atraumatic.      Right Ear: External ear normal.      Left Ear: External ear normal.     Eyes:      Conjunctiva/sclera: Conjunctivae normal.     Pulmonary:      Effort: Pulmonary effort is normal. No respiratory distress.   Abdominal:      General: A surgical scar is present. The ostomy site is clean. There is no distension.      Palpations: Abdomen is soft.      Tenderness: There is no abdominal tenderness. There is no guarding or rebound.      Hernia: No hernia is present.      Comments: Right lower quadrant urostomy present. Stoma is round shaped, budded, moist and red in color. Os is noted with centrally with stent in place (patient reports this is permanent). Urine noted to be coming from stent and from around the stent.  Stoma positive for clear yellow urine that is non-malodorous. Mucocutaneous junction is intact without separation.  No apparent hernia or prolapse present. See skin assessment for alise-stomal skin assessment details.     Musculoskeletal:      Cervical back: Neck supple.     Skin:     General: Skin is warm and dry.      Findings: Wound present.      Comments: 1.  Peristomal skin with mild MASD from leaking.  No active drainage at time of the assessment.  Noted areas of freshly healed intact epithelialized skin. No evidence of fungal rash or allergic contact dermatitis.      Neurological:      Mental Status: She is alert.     Psychiatric:         Behavior: Behavior is cooperative.                 Lab Results   Component Value Date    HGBA1C 5.2 10/31/2022       Wound Instructions:  Orders Placed This Encounter   Procedures    Wound cleansing and dressings     Ostomy Care:  -Stoma Measurement: 25mm      -Appliance Used During Bag Change: Coloplast Deep convexity urostomy 03229 samples ordered and pre-cut sent with you     -Accessories Used: Brava barrier strips      Ostomy Care:  1. Peel back pouch using push-pull method, may use non-alcohol  "adhesive remover(Purple and white package).  2. Use warm water only to cleanse skin around the stoma (alise-stomal skin).  3. Make sure all adhesive residue is removed and skin is dry and not oily.  4. Measure stoma size using measuring guide and trace correct measurements onto back of pouch (Off set opening away from abdominal incision).   5. Then cut backing of pouch out to correct shape/size.  6. Place pouch over stoma and onto skin.  7. Use warmth of hand to apply gentle pressure to help backing of pouch to adhere well to skin.      TIPS:  *Empty pouch when 1/3 -1/2 full.  *Change pouch every 3-4 days or if signs of leaking.  *Can shower with pouch on or off. Make sure to dry off pouch after shower.    Crusting: as needed for moist, red, open skin around the stoma: (Done prior to pouch application)  -Apply stoma powder to skin breakdown, then dust away the excess powder.   -Then use skin barrier film to pat/dab over the powder and let dry to form \"crust\"  Repeat X2 before placing new ostomy pouch      Standing Status:   Future     Expiration Date:   6/5/2025           RAFITA Mike, CHEYENNE-C, ALINA    Portions of the record may have been created with voice recognition software. Occasional wrong word or \"sound alike\" substitutions may have occurred due to the inherent limitations of voice recognition software. Read the chart carefully and recognize, using context, where substitutions have occurred.          Total time spent today:    Total time (face-to-face and non-face-to-face) spent on today's visit was 23 minutes. This includes preparation for the visits (i.e. reviewing test results from date recent hospitalizations, ER/Urgent Care/primary care visits and recent consultant office visits), performance of a medically appropriate history and examination, and orders for medications or testing.          [1]   Patient Active Problem List  Diagnosis    Benign essential hypertension    Major depression, " chronic    Dyslipidemia    Endometrial cancer (HCC)    Encounter for follow-up surveillance of endometrial cancer    Encounter for central line care    Chemotherapy induced neutropenia (HCC)    Anemia    Acute on chronic kidney disease    Bilateral lower extremity edema    Severe protein-calorie malnutrition (HCC)    Obstruction of right ureter    Gram-negative bacteremia    Overactive bladder    Acute left-sided low back pain without sciatica    Nephrostomy status (HCC)    Bilateral piriformis syndrome    Chronic pain syndrome    Myofascial pain syndrome    Pathological fracture due to osteoporosis    Osteoporosis    Dental disorder    Need for follow-up by     Closed nondisplaced fracture of right pubis with delayed healing    Chronic UTI    Depression, recurrent (HCC)    Vaginal atrophy    Nephrotoxicity    Dry mouth    Abnormal iron saturation    Hemorrhagic cystitis w/ pyelonephritis    History of DVT (deep vein thrombosis)    Other proteinuria    CKD (chronic kidney disease) stage 4, GFR 15-29 ml/min (HCC)    Preop cardiovascular exam    Left-sided chest wall pain    Ureteral stricture    Overweight with body mass index (BMI) of 28 to 28.9 in adult    Thrombocytopenia, unspecified (HCC)    CKD (chronic kidney disease)    Bilateral hydronephrosis    Anemia due to stage 4 chronic kidney disease  (HCC)    Lower abdominal pain    Gastroesophageal reflux disease    Sepsis (HCC)    Left flank pain    Acute kidney injury (HCC)   [2]   Past Medical History:  Diagnosis Date    Anemia     Anxiety     Cancer (HCC)     ENDOMETRIAL    Chemotherapy induced neutropenia (HCC) 08/30/2019    Chronic kidney disease     CKD (chronic kidney disease) stage 3, GFR 30-59 ml/min ([facility])     COVID     Fall 2022    Depression     DVT (deep venous thrombosis) (HCC) 07/19/2019    RIGHT LEG    Endometrial cancer (HCC) 12/2017    GERD (gastroesophageal reflux disease)     H/O gastric bypass     Hard to intubate     pt denies AS  OF 7/25/23    History of chemotherapy     started 12/2021endometrial cancer- done 12/23/22    History of DVT in adulthood     RLE    History of transfusion 04/13/2023    Hyperglycemia     vx type 2 dm -- last assessed 4/1/14; resolved 11/7/17    Hyperlipidemia     Hypertension     in past- no meds at present    Iron deficiency anemia     iron infusions weekly    Iron deficiency anemia secondary to inadequate dietary iron intake 02/08/2023    OAB (overactive bladder)     Port-A-Cath in place     Wears glasses    [3]   Past Surgical History:  Procedure Laterality Date    ABDOMINAL SURGERY      GASTRIC BYPASS; 2001    BREAST BIOPSY Right 06/28/2019    core biopsy; benign    CHOLECYSTECTOMY      at the time of gastric bypass    COLONOSCOPY      CT NEEDLE BIOPSY LYMPH NODE  07/08/2019    CYSTECTOMY, RADICAL WITH ILEOCONDUIT N/A 10/24/2024    Procedure: ILEAL CONDUIT urinary diversion (No cystectomy), BILATERAL UTERO-LYSIS;  Surgeon: Demetrius Lowe MD;  [facility];  Service: Urology    FL GUIDED CENTRAL VENOUS ACCESS DEVICE INSERTION  11/12/2019    FL RETROGRADE PYELOGRAM  03/30/2023    FL RETROGRADE PYELOGRAM  05/09/2023    FL RETROGRADE PYELOGRAM  8/1/2023    GASTRIC BYPASS      HYSTERECTOMY Bilateral 2017    total abdominal with salpingo-oophorectomy    IR NEPHROSTOMY TO NEPHROURETERAL STENT  06/09/2022    IR NEPHROSTOMY TO NEPHROURETERAL STENT  12/20/2022    IR NEPHROSTOMY TO NEPHROURETERAL STENT  8/8/2023    IR NEPHROSTOMY TO NEPHROURETERAL STENT  12/4/2024    IR NEPHROSTOMY TO NEPHROURETERAL STENT  3/6/2025    IR NEPHROSTOMY TO NEPHROURETERAL STENT  5/7/2025    IR NEPHROSTOMY TUBE CHECK/CHANGE/REPOSITION/REINSERTION/UPSIZE  05/27/2022    IR NEPHROSTOMY TUBE CHECK/CHANGE/REPOSITION/REINSERTION/UPSIZE  11/10/2023    IR NEPHROSTOMY TUBE CHECK/CHANGE/REPOSITION/REINSERTION/UPSIZE  2/9/2024    IR NEPHROSTOMY TUBE CHECK/CHANGE/REPOSITION/REINSERTION/UPSIZE  4/12/2024    IR NEPHROSTOMY TUBE  CHECK/CHANGE/REPOSITION/REINSERTION/UPSIZE  7/12/2024    IR NEPHROSTOMY TUBE CHECK/CHANGE/REPOSITION/REINSERTION/UPSIZE  10/16/2024    IR NEPHROSTOMY TUBE CHECK/CHANGE/REPOSITION/REINSERTION/UPSIZE  11/6/2024    IR NEPHROSTOMY TUBE PLACEMENT  07/26/2019    IR NEPHROSTOMY TUBE PLACEMENT  05/27/2023    IR NEPHROURETERAL STENT CHECK/CHANGE/REPOSITION  04/06/2021    IR NEPHROURETERAL STENT CHECK/CHANGE/REPOSITION  06/04/2021    IR NEPHROURETERAL STENT CHECK/CHANGE/REPOSITION  09/03/2021    IR NEPHROURETERAL STENT CHECK/CHANGE/REPOSITION  12/07/2021    IR NEPHROURETERAL STENT CHECK/CHANGE/REPOSITION  03/08/2022    IR NEPHROURETERAL STENT CHECK/CHANGE/REPOSITION  05/10/2022    IR NEPHROURETERAL STENT CHECK/CHANGE/REPOSITION  09/19/2022    IR PICC LINE  09/27/2019    IR PORT PLACEMENT  07/26/2019    IR PORT PLACEMENT      IR PORT REMOVAL  09/20/2019    OOPHORECTOMY Bilateral 2017    AL CYSTO W/INSERT URETERAL STENT Right 03/30/2023    Procedure: EXCHANGE STENT URETERAL;  Surgeon: Demetrius Lowe MD;  [facility];  Service: Urology    AL CYSTOURETHROSCOPY W/URETERAL CATHETERIZATION Right 03/30/2023    Procedure: CYSTOSCOPY RETROGRADE PYELOGRAM WITH exchange of STENT URETERAL;  Surgeon: Demetrius Lowe MD;  [facility];  Service: Urology    AL CYSTOURETHROSCOPY W/URETERAL CATHETERIZATION Bilateral 05/09/2023    Procedure: CYSTOSCOPY, BILATERAL RETROGRADE PYELOGRAM, WITH LEFT INSERTION STENT URETERAL, RIGHT URTERAL STENT EXCHANGE;  Surgeon: Nicola Hannah MD;  [facility];  Service: Urology    AL CYSTOURETHROSCOPY W/URETERAL CATHETERIZATION Bilateral 8/1/2023    Procedure: CYSTOSCOPY BILATERAL RETROGRADE  WITH REMOVAL BILATERAL  STENT URETERAL;  Surgeon: Demetrius Lowe MD;  [facility];  Service: Urology    AL INSJ TUNNELED CTR VAD W/SUBQ PORT AGE 5 YR/> Left 11/12/2019    Procedure: INSERTION VENOUS PORT ( PORT-A-CATH) IR;  Surgeon: Edilberto Guzman DO;  [facility];  Service: Interventional Radiology    DE LAPS ABD PRTM&OMENTUM DX W/WO SPEC BR/WA SPX N/A 12/19/2017    Procedure: LAPAROSCOPY DIAGNOSTIC;  Surgeon: Evgeny So MD;  [facility];  Service: Gynecology Oncology    DE LAPS W/RAD HYST W/BILAT LMPHADEC RMVL TUBE/OVARY N/A 12/19/2017    Procedure: HYSTERECTOMY LAPAROSCOPIC TOTAL (LTH) W/ ROBOTICS; BILATERAL SALPINGOOPHERECTOMY; LYMPH NODE DISSECTION; lysis of adhesions;  Surgeon: Evgeny So MD;  [facility];  Service: Gynecology Oncology    REMOVAL URETERAL STENT Bilateral 8/1/2023    Procedure: REMOVAL STENT URETERAL;  Surgeon: Demetrius Lowe MD;  [facility];  Service: Urology    TONSILLECTOMY      US GUIDED BREAST BIOPSY RIGHT COMPLETE Right 06/28/2019   [4]   Family History  Problem Relation Name Age of Onset    Hyperlipidemia Mother Emiliana     Heart disease Mother Emiliana     Ovarian cancer Mother Emiliana 50    Colon cancer Mother Emiliana     Lymphoma Father Car     Breast cancer Sister Nahomi 63    No Known Problems Brother Fernando     No Known Problems Brother Car     No Known Problems Maternal Grandmother      Bone cancer Maternal Grandfather      Uterine cancer Paternal Grandmother      No Known Problems Paternal Grandfather      No Known Problems Maternal Aunt chen     No Known Problems Maternal Aunt gentry     No Known Problems Maternal Aunt virginia     No Known Problems Maternal Aunt hugh     No Known Problems Paternal Aunt ofelia     No Known Problems Paternal Aunt gissell     No Known Problems Paternal Aunt jd     No Known Problems Paternal Aunt laureen    [5]   Social History  Socioeconomic History    Marital status: Single   Tobacco Use    Smoking status: Never    Smokeless tobacco: Never   Vaping Use    Vaping status: Never Used   Substance and Sexual Activity    Alcohol use: Never    Drug use: Never    Sexual activity: Not Currently     Social Drivers of Health     Financial  Resource Strain: Low Risk  (2023)    Overall Financial Resource Strain (CARDIA)     Difficulty of Paying Living Expenses: Not very hard   Food Insecurity: No Food Insecurity (2025)    Nursing - Inadequate Food Risk Classification     Worried About Running Out of Food in the Last Year: Never true     Ran Out of Food in the Last Year: Never true     Ran Out of Food in the Last Year: Never true   Transportation Needs: No Transportation Needs (2025)    Nursing - Transportation Risk Classification     Lack of Transportation: No   Intimate Partner Violence: Unknown (2025)    Nursing IPS     Physically Hurt by Someone: No     Hurt or Threatened by Someone: No   Housing Stability: Unknown (2025)    Nursing: Inadequate Housing Risk Classification     Unable to Pay for Housing in the Last Year: No     Has Housin   [6]   Current Outpatient Medications:     acetaminophen (TYLENOL) 325 mg tablet, Take 2 tablets (650 mg total) by mouth every 6 (six) hours as needed for mild pain, Disp: , Rfl:     ARIPiprazole (ABILIFY) 20 MG tablet, Take 20 mg by mouth every morning, Disp: , Rfl:     calcitriol (ROCALTROL) 0.25 mcg capsule, Take 1 capsule (0.25 mcg total) by mouth daily, Disp: 90 capsule, Rfl: 3    calcium acetate (PHOSLO) capsule, Take 2 capsules (1,334 mg total) by mouth 2 (two) times a day 2 capsules with lunch, 2 capsules with dinner, Disp: 180 capsule, Rfl: 3    Calcium Carbonate (CALCIUM 500 PO), Take 500 mg by mouth in the morning, Disp: , Rfl:     Calcium-Magnesium-Vitamin D (CALCIUM 500 PO), Take 1 capsule by mouth daily at bedtime, Disp: , Rfl:     cetirizine (ZyrTEC) 10 mg tablet, Take 1 tablet (10 mg total) by mouth daily, Disp: 90 tablet, Rfl: 1    cholecalciferol (VITAMIN D3) 1,000 units tablet, Take 4 tablets (4,000 Units total) by mouth daily, Disp: 90 tablet, Rfl: 0    folic acid (FOLVITE) 1 mg tablet, Take 1 tablet (1 mg total) by mouth daily, Disp: , Rfl: 0    Multiple Vitamin  (MULTIVITAMIN) tablet, Take 1 tablet by mouth every morning, Disp: , Rfl:     omeprazole (PriLOSEC) 20 mg delayed release capsule, Take 20 mg by mouth as needed (GERD), Disp: , Rfl:     SODIUM CHLORIDE, EXTERNAL, 0.9 % SOLN, 10 mL by Intracatheter route in the morning. Intracatheter flushing daily. May substitute prefilled syringe with normal saline 10 mL vials, 10 mL syringes, and 18 g blunt needles., Disp: 300 mL, Rfl: 1    venlafaxine (EFFEXOR-XR) 75 mg 24 hr capsule, Take by mouth, Disp: , Rfl:     Current Facility-Administered Medications:     denosumab (PROLIA) subcutaneous injection 60 mg, 60 mg, Subcutaneous, Q6 Months, , 60 mg at 05/28/25 1115

## 2025-06-05 PROBLEM — N32.81 OVERACTIVE BLADDER: Status: RESOLVED | Noted: 2020-08-06 | Resolved: 2025-06-05

## 2025-06-05 PROBLEM — N13.5 URETERAL STRICTURE: Status: RESOLVED | Noted: 2023-08-30 | Resolved: 2025-06-05

## 2025-06-05 PROBLEM — N13.5 OBSTRUCTION OF RIGHT URETER: Status: RESOLVED | Noted: 2020-01-03 | Resolved: 2025-06-05

## 2025-06-05 PROBLEM — N14.4 NEPHROTOXICITY: Status: RESOLVED | Noted: 2022-10-03 | Resolved: 2025-06-05

## 2025-06-05 PROBLEM — N17.9 ACUTE KIDNEY INJURY (HCC): Status: RESOLVED | Noted: 2025-05-06 | Resolved: 2025-06-05

## 2025-06-05 PROBLEM — N30.91 HEMORRHAGIC CYSTITIS: Status: RESOLVED | Noted: 2023-05-04 | Resolved: 2025-06-05

## 2025-06-05 PROBLEM — N13.30 BILATERAL HYDRONEPHROSIS: Status: RESOLVED | Noted: 2024-10-24 | Resolved: 2025-06-05

## 2025-06-05 NOTE — PROGRESS NOTES
Name: Ragini Melendez      : 1955      MRN: [id number]  Encounter Provider: Beth Rodriguez MD  Encounter Date: 2025   Encounter department: [facility] [place]  :  Assessment & Plan  Encounter for follow-up surveillance of endometrial cancer    Clinically [facility]   Exam benign    2.5 years from treatment. Continue with q6 month surveillance visits        Nephrostomy status (HCC)  Continue right PCN and stent exchange on left.        CKD (chronic kidney disease) stage 4, GFR 15-29 ml/min (HCC)  Lab Results   Component Value Date    EGFR 20 2025    EGFR 15 2025    EGFR 13 05/10/2025    CREATININE 2.42 (H) 2025    CREATININE 3.04 (H) 2025    CREATININE 3.38 (H) 05/10/2025     Working with nephro regarding candidacy for transplant in the future - reports may be a few years still          Assessment & Plan  Recurrent stage Ib endometrial cancer: In remission, off treatment for 2.5 years. Ileal conduit is functioning well with no disease recurrence. Imaging is satisfactory. Continue current management and follow-ups with wound care for ostomy maintenance. Contact the clinic if new symptoms arise.    Chronic ureteral obstruction: Secondary to radiation. The right kidney is atrophied with minimal urine output from the PCN, while the left kidney is managed with a ureteral stent. The patient is followed by the renal transplant team and is a potential candidate if GFR declines. Monitor urine output and report significant changes.    Pyelonephritis: Admitted in May with no current signs of infection. Monitor for fever, flank pain, or changes in urine output, and seek medical attention if symptoms occur.    Kidney stones: History of kidney stones, advised to avoid foods high in calcium and sugar. The stent may contribute to stone formation. Stay hydrated and follow dietary recommendations to prevent recurrence.    Follow-up: In 6 months.          History of Present Illness    Reason for Visit / CC: follow up    Ragini Melendez is a 69 y.o. female   History of Present Illness  The patient is a 69-year-old female with recurrent stage 1B endometrial cancer, currently in remission. Diagnosed in 2017, she received multiple lines of chemotherapy, last in December 2022. She developed chronic ureteral obstruction secondary to radiation and underwent an attempted ileal conduit. Concern for right kidney atrophy with minimal urine output from the PCN; left side managed with ureteral stent by interventional radiology. Followed by renal transplant team, likely eligible for transplantation if GFR declines. Imaging for flank pain showed mild left hydronephrosis and possible colitis, no evidence of disease. Admitted in 05/2025 for pyelonephritis. Seen in ED for urostomy issues, discharged after repositioning. Followed by wound care for ostomy maintenance. Pt denies abdominal bloating/pain/cramping. No vaginal bleeding/discharge. No changes in BM. No early satiety/Appetite adequate.     Pertinent Medical History   5/2/25 CT   IMPRESSION:     Ileal conduit. Left ureteral stent extends from the left renal hilum through the conduit and stoma.     Mild left hydronephrosis without hydroureter. Moderate left perinephric inflammatory fat stranding without fluid collection. Findings may represent collecting system obstruction and/or urinary tract infection/pyelonephritis.     Oncology History   Cancer Staging   Endometrial cancer (HCC)  Staging form: Corpus Uteri - Carcinoma, AJCC 7th Edition  - Clinical stage from 12/19/2017: FIGO Stage IB (T1b, N0, M0) - Signed by Evgeny So MD on 2/12/2018  Staged by: Managing physician  Histopathologic type: Endometrioid adenocarcinoma, NOS  Tumor size (mm): 44  Histologic grade (G): G1  Lymph-vascular invasion (LVI): LVI not present (absent)/not identified  Residual tumor (R): R0  Pelvic deb status: Negative  Para-aortic status: Not assessed  Omentectomy  performed: No  Stage used in treatment planning: Yes  National guidelines used in treatment planning: Yes  Oncology History   Endometrial cancer (HCC)   11/17/2017 Initial Diagnosis    Endometrial cancer (HCC)     11/17/2017 Biopsy    ENDOMETRIAL BIOPSY: WELL DIFFERENTIATED ENDOMETRIAL ADENOCARCINOMA (FIGO I) WITH FOCALMUCINOUS FEATURES.    Part B: ENDOCERVICAL POLYP:BENIGN ENDOCERVICAL      12/19/2017 Surgery    Robotic assisted total laparoscopic hysterectomy with bilateral salpingo-oophorectomy and sentinel bilateral pelvic lymph node dissection. 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 (4.4 x 3.2 cm tumor, 9.4/15.4mm invasion, NO LVSI, washings revealed atypical cellular changes)     12/19/2017 Genetic Testing    Morrison testing negative     6/28/2019 Biopsy    A. Breast, Right, US BX Right Breast 1000 4 cmfn:  - Benign breast tissue with focal histiocytic aggregate.  See comment.  - Negative for atypia and in-situ or invasive carcinoma.     7/8/2019 Recurrence    Presented with right lower extremity DVT and CT demonstrating right pelvic sidewall mass with venous, ureteral and nerve compression causing significant neuropathic pain.      Biopsy:  Lymph Node, Right pelvic lymph node x3:  - High-grade adenocarcinoma.      7/30/2019 - 1/6/2020 Chemotherapy    Taxol 175 mg/m2 and carboplatin AUC 6 every 21 days. Dose was reduced to taxol 135 mg/m2 and carboplatin AUC 5. Completed 6 cycles.  Treatment protracted due to multiple hospital admissions.     2/24/2020 - 4/13/2020 Radiation    adjuvant external beam radiation therapy to the whole pelvis to 4500 cGy followed by boost to gross disease of an additional 2200 cGy     11/23/2020 Progression    New necrotic adenopathy in the retroperitoneum on CT.     12/21/2020 - 12/5/2022 Chemotherapy    Began chemotherapy with rucaparib, atezolizumab, and bevacizumab as per EndoBARR clinical trial.    Rucaparib held cycle 28 secondary to fatigue  Avastin held  "since cycle 30 for elevated UPC  Treatment held for one cycle after cycle 31 for mucositis/fatigue          Review of Systems   Constitutional:  Negative for appetite change, chills, fatigue and fever.   Respiratory:  Negative for chest tightness and shortness of breath.    Gastrointestinal:  Negative for abdominal distention, abdominal pain, constipation, diarrhea and nausea.   Genitourinary:  Negative for difficulty urinating, flank pain, frequency, urgency, vaginal bleeding, vaginal discharge and vaginal pain.   Musculoskeletal:  Negative for back pain, joint swelling and myalgias.   Skin:  Negative for rash.   Neurological:  Negative for dizziness, light-headedness, numbness and headaches.    A complete review of systems is negative other than that noted above in the HPI.       Objective   /88   Pulse 66   Temp (!) 97 °F (36.1 °C) (Temporal)   Ht 4' 10\" (1.473 m)   Wt 71.2 kg (157 lb)   LMP  (LMP Unknown)   SpO2 99%   BMI 32.81 kg/m²     Body mass index is 32.81 kg/m².  Pain Screening:     ECOG ECOG Performance Status: 1 - Restricted in physically strenuous activity but ambulatory and able to carry out work of a light or sedentary nature, e.g., light house work, office work   Physical Exam  HENT:      Head: Normocephalic and atraumatic.     Cardiovascular:      Rate and Rhythm: Normal rate and regular rhythm.   Pulmonary:      Effort: Pulmonary effort is normal.   Abdominal:      General: There is no distension.      Palpations: Abdomen is soft. There is no mass.      Comments: Ileal conduit with healthy tissue    Genitourinary:     Comments: Chaperone present for exam.  The external female genitalia is normal. The bartholin's, uretheral and skenes glands are normal. The urethral meatus is normal (midline with no lesions). Anus without fissure or lesion. Speculum exam reveals a grossly normal vagina cuff. No masses, lesions,discharge or bleeding. No significant cystocele or rectocele noted. Bimanual " "exam notes a surgical absent cervix, uterus and adnexal structures. No masses or fullness. Bladder is without fullness, mass or tenderness.      Musculoskeletal:         General: Normal range of motion.      Cervical back: Normal range of motion.     Skin:     General: Skin is warm and dry.     Neurological:      Mental Status: She is alert and oriented to person, place, and time.       Physical Exam  Genitourinary: Urostomy site examined, no leakage. Stent in place. Some skin breakdown around site, overall healthy.  Integument/Skin: Skin breakdown around urostomy site.     Results  Imaging: Mild left hydronephrosis and possible colitis, no evidence of disease.  Labs: I have reviewed pertinent labs.   No results found for: \"\"  Lab Results   Component Value Date/Time    Potassium 4.0 05/20/2025 07:29 AM    Chloride 108 05/20/2025 07:29 AM    CO2 21 05/20/2025 07:29 AM    BUN 45 (H) 05/20/2025 07:29 AM    Creatinine 2.42 (H) 05/20/2025 07:29 AM    Glucose, Fasting 98 05/20/2025 07:29 AM    Calcium 9.1 05/20/2025 07:29 AM    Corrected Calcium 9.4 10/30/2024 05:12 AM    AST 18 05/06/2025 06:13 PM    ALT 29 05/06/2025 06:13 PM    Alkaline Phosphatase 110 (H) 05/06/2025 06:13 PM    eGFR 20 05/20/2025 07:29 AM     Lab Results   Component Value Date/Time    WBC 13.32 (H) 05/20/2025 07:29 AM    Hemoglobin 11.3 (L) 05/20/2025 07:29 AM    Hematocrit 37.1 05/20/2025 07:29 AM    MCV 96 05/20/2025 07:29 AM    Platelets 403 (H) 05/20/2025 07:29 AM     Lab Results   Component Value Date/Time    Absolute Neutrophils 6.98 02/10/2025 08:39 AM        Trend:  No results found for: \"\"            "

## 2025-06-05 NOTE — ASSESSMENT & PLAN NOTE
Clinically [facility]   Exam benign    2.5 years from treatment. Continue with q6 month surveillance visits

## 2025-06-05 NOTE — ASSESSMENT & PLAN NOTE
Lab Results   Component Value Date    EGFR 20 05/20/2025    EGFR 15 05/11/2025    EGFR 13 05/10/2025    CREATININE 2.42 (H) 05/20/2025    CREATININE 3.04 (H) 05/11/2025    CREATININE 3.38 (H) 05/10/2025     Working with nephro regarding candidacy for transplant in the future - reports may be a few years still

## 2025-06-06 ENCOUNTER — OFFICE VISIT (OUTPATIENT)
Dept: GYNECOLOGIC ONCOLOGY | Facility: CLINIC | Age: 70
End: 2025-06-06
Payer: COMMERCIAL

## 2025-06-06 VITALS
OXYGEN SATURATION: 99 % | SYSTOLIC BLOOD PRESSURE: 128 MMHG | WEIGHT: 157 LBS | DIASTOLIC BLOOD PRESSURE: 88 MMHG | TEMPERATURE: 97 F | HEIGHT: 58 IN | HEART RATE: 66 BPM | BODY MASS INDEX: 32.95 KG/M2

## 2025-06-06 DIAGNOSIS — N18.4 CKD (CHRONIC KIDNEY DISEASE) STAGE 4, GFR 15-29 ML/MIN (HCC): ICD-10-CM

## 2025-06-06 DIAGNOSIS — Z85.42 ENCOUNTER FOR FOLLOW-UP SURVEILLANCE OF ENDOMETRIAL CANCER: Primary | ICD-10-CM

## 2025-06-06 DIAGNOSIS — Z08 ENCOUNTER FOR FOLLOW-UP SURVEILLANCE OF ENDOMETRIAL CANCER: Primary | ICD-10-CM

## 2025-06-06 DIAGNOSIS — Z93.6 NEPHROSTOMY STATUS (HCC): ICD-10-CM

## 2025-06-06 PROCEDURE — G2211 COMPLEX E/M VISIT ADD ON: HCPCS | Performed by: OBSTETRICS & GYNECOLOGY

## 2025-06-06 PROCEDURE — 99213 OFFICE O/P EST LOW 20 MIN: CPT | Performed by: OBSTETRICS & GYNECOLOGY

## 2025-06-06 PROCEDURE — 99459 PELVIC EXAMINATION: CPT | Performed by: OBSTETRICS & GYNECOLOGY

## 2025-06-07 PROBLEM — A41.9 SEPSIS (HCC): Status: RESOLVED | Noted: 2025-05-06 | Resolved: 2025-06-07

## 2025-06-16 ENCOUNTER — HOSPITAL ENCOUNTER (OUTPATIENT)
Dept: INFUSION CENTER | Facility: CLINIC | Age: 70
End: 2025-06-16

## 2025-06-19 ENCOUNTER — HOSPITAL ENCOUNTER (OUTPATIENT)
Dept: INFUSION CENTER | Facility: CLINIC | Age: 70
Discharge: HOME | End: 2025-06-19

## 2025-06-19 ENCOUNTER — HOSPITAL ENCOUNTER (EMERGENCY)
Facility: HOSPITAL | Age: 70
Discharge: HOME | End: 2025-06-19
Attending: EMERGENCY MEDICINE | Admitting: STUDENT IN AN ORGANIZED HEALTH CARE EDUCATION/TRAINING PROGRAM
Payer: COMMERCIAL

## 2025-06-19 ENCOUNTER — APPOINTMENT (EMERGENCY)
Dept: RADIOLOGY | Facility: HOSPITAL | Age: 70
End: 2025-06-19
Payer: COMMERCIAL

## 2025-06-19 VITALS
RESPIRATION RATE: 18 BRPM | HEART RATE: 71 BPM | TEMPERATURE: 97.8 F | SYSTOLIC BLOOD PRESSURE: 141 MMHG | OXYGEN SATURATION: 98 % | DIASTOLIC BLOOD PRESSURE: 67 MMHG

## 2025-06-19 DIAGNOSIS — T83.022A NEPHROSTOMY TUBE DISPLACED (HCC): Primary | ICD-10-CM

## 2025-06-19 PROCEDURE — C1769 GUIDE WIRE: HCPCS

## 2025-06-19 PROCEDURE — NC001 PR NO CHARGE: Performed by: STUDENT IN AN ORGANIZED HEALTH CARE EDUCATION/TRAINING PROGRAM

## 2025-06-19 PROCEDURE — 50435 EXCHANGE NEPHROSTOMY CATH: CPT

## 2025-06-19 PROCEDURE — 99283 EMERGENCY DEPT VISIT LOW MDM: CPT

## 2025-06-19 PROCEDURE — 50688 CHANGE OF URETER TUBE/STENT: CPT

## 2025-06-19 PROCEDURE — C1729 CATH, DRAINAGE: HCPCS

## 2025-06-19 PROCEDURE — 99284 EMERGENCY DEPT VISIT MOD MDM: CPT | Performed by: EMERGENCY MEDICINE

## 2025-06-19 PROCEDURE — 75984 XRAY CONTROL CATHETER CHANGE: CPT

## 2025-06-19 RX ORDER — LIDOCAINE WITH 8.4% SOD BICARB 0.9%(10ML)
SYRINGE (ML) INJECTION AS NEEDED
Status: COMPLETED | OUTPATIENT
Start: 2025-06-19 | End: 2025-06-19

## 2025-06-19 RX ADMIN — Medication 10 ML: at 08:41

## 2025-06-19 RX ADMIN — IOHEXOL 25 ML: 350 INJECTION, SOLUTION INTRAVENOUS at 09:09

## 2025-06-19 NOTE — DISCHARGE INSTRUCTIONS
Nephrostomy Tube Care     WHAT YOU NEED TO KNOW:   A nephrostomy tube is a catheter (thin plastic tube) that is inserted through your skin and into your kidney. The nephrostomy tube drains urine from your kidney into a collecting bag outside your body. You may need a nephrostomy tube when something is blocking the normal flow of urine. A nephrostomy tube may be used for a short or a long period of time. The nephrostomy tube comes out of your back, so you will need someone to help care for your nephrostomy tube.          DISCHARGE INSTRUCTIONS:      How to clean the skin around the nephrostomy tube and change the bandage:  Since the nephrostomy tube comes out of your back, you will not be able to care for it by yourself. Ask someone to follow the general directions below to check and care for your nephrostomy tube.   Gather the items you will need.          Disposable (single use) under-pad, and a clean washcloth  Plain soap, warm water, and new medical gloves  Sterile gauze bandages  Clear adhesive dressing or medical tape  Skin barrier  Protective skin film  Trash bag  Remove the old bandage, and check the tube entry site.    Have the patient lie on his side with the nephrostomy tube entry site facing up. Place the under-pad where it will catch drainage as you are working with the nephrostomy tube.   Wash your hands with soap and water. Put on new medical gloves.  Gently remove the old bandage, without pulling on the tube. Do this by holding the skin beside the tube with one hand. With the other hand, gently remove sticky tape and the skin barrier by pulling in the same direction as hair growth. Do not touch the side of the bandage that is placed over or around the tube. Throw the bandage and skin barrier away in a trash bag.  Look for signs of infection, such as skin redness and swelling. Report any skin changes to healthcare providers.  Clean the tube entry site.    Hold the tube in place to keep it from  being pulled out while you are cleaning around it.  You will need to clean the area twice. For the first cleaning, wet a new gauze bandage with soap and water.  Begin at the entry site of the tube. Wipe the skin in circles, moving away from the entry site. Remove blood and any other material with the gauze. Do this as often as needed. Use a new gauze bandage each time you clean the area, moving away from the entry site.   For the second cleaning, wet a new gauze bandage with water. Begin at the entry site of the tube. Wipe the skin in circles, moving away from the entry site. Use a new gauze bandage each time you clean the area, moving away from the entry site.   Gently pat the skin with a clean washcloth to dry it.    Apply the skin barrier and bandages.    Roll up a bandage to make it thick, and place it under  the place where the tube enters the skin. Place it to support the tube, and stop it from kinking or bending. Tape the bandage in place, and apply more bandages if directed by a healthcare provider.   Bring the tubing forward to the front and tape it to the skin. Do not stretch the tube tight, because this may pull the nephrostomy tube out.  How often to change the bandage.  Change the bandage around the tube, every other day. If your bandages  get dirty or wet, change them right away, and as often as needed. If your nephrostomy tube is to be used for a long period of time, the tube needs to be changed every 2 to 3 months. Healthcare providers will tell you when you need to make an appointment to have your tube changed.     How to care for the urine drainage bag:   Ask if you need to measure and write down how much urine is in the bag before you empty it. Drain urine out of the drainage bag when it is ½ to ? full. Open the spout at the bottom of the bag to empty the urine into the toilet.   You may need to detach the drainage bag from the nephrostomy tube to change it.. If so, attach a new drainage bag  tightly to the nephrostomy tube.     How to prevent problems with your nephrostomy tube:   Change bandages, directed.  This helps to prevent infection. Throw away or clean your drainage bag as directed by your healthcare provider.    Wipe the connecting ends of the drainage bag with alcohol before you reconnect the bag to the tube.  This helps prevent infection.     Keep the tube taped to your skin and connected to a drainage bag placed below the level of your kidneys.  This helps prevent urine from backing up into your kidneys. You may wear a small drainage bag strapped to your leg to let you move around more easily.    Check the catheter to be sure it is in place after you change your clothes or do other activities.  Do not wear tight clothing over the tube. Place the tubing over your thigh rather than under it when you are sitting down. Be sure that nothing is pulling on the nephrostomy tube when you move around.    Change positions if you see little or no urine in your drainage bag.  Check to see if the urine tube is twisted or bent. Be sure that you are not sitting or lying on the tube. If there are no kinks and there is little or no urine in the drainage bag, tell your healthcare provider.    Flush out the tube as directed. Some tubes get flushed one time a day with 10 mls of NSS You will be given a prescription for the flushes.  To flush the nephrostomy tube, clean both connections with alcohol swap. Twist off the drainage bag tube and twist the saline syringe into the nephrostomy tube and flush briskly. Remove the syringe and twist the drainage bag tube back into the nephrostomy tube.  Keep the site covered while you shower.  Tape a piece of clear adhesive plastic over the dressing to keep it dry while you shower. Do not take tub baths.    Contact Interventional Radiology at [phone] ([facility] PATIENTS: Contact Interventional Radiology at [phone]) ([facility] PATIENTS: Contact Interventional Radiology at  [phone]) if:  The skin around the nephrostomy tube is red, swollen, itches, or has a rash.   You have a fever greater than 101 or chills.  You have lower back or hip pain.  There are changes in how your urine looks or smells.  You have little or no urine draining from the nephrostomy tube.   You have nausea and are vomiting.  The black bernardo on your tube has moved, or the tube is longer than when it was put in.   You have questions or concerns about your condition or care.  The nephrostomy tube comes out completely.   There is blood, pus, or a bad smell coming from the place where the tube enters your skin.  Urine is leaking around the tube.        The following pharmacies carry the flush syringes.       [facility]                     [address]                     [address] [facility]                    [phone]  [place]                       [place]  Phone [phone]            Phone [phone]                 [facility]                                                                                                   [phone]  [facility]             [facility]                             [address] [place], [place] [address] [facility]   Luis SANCHEZ                                 [phone]  Phone [phone]            Phone [phone]    [facility]                                                                         [facility] [phone]  Samuel Luz.  [place]   Phone [phone]

## 2025-06-19 NOTE — SEDATION DOCUMENTATION
Procedure completed by Dr Ames. Pt tolerated without issues. Education provided to pt prior to and throughout procedure, questions answered as offered. Dressings to sites. Transported back to ED by IR staff, bedside report given.

## 2025-06-19 NOTE — CONSULTS
e-Consult (IP[facility])  - Interventional Radiology  Ragini Melendez 69 y.o. female MRN: [id number]  Unit/Bed#: ED-24 Encounter: [id number]          Interventional Radiology has been consulted to evaluate Ragini Melendez    We were consulted by Emergency Medicine concerning this 69-year-old woman, known to IR service, with history of endometrial cancer s/p ASH/BSO with radiation, development of bilateral radiation-induced ureteral strictures s/p ileal conduit with ureteral stricture/occlusions currently managed with right PCN and left retrograde PCNU.    These were last exchanged 5/7/25 and she had an appointment tomorrow for routine exchange.    Unfortunately her right PCN was caught on something and inadvertently removed this morning.    Inpatient Consult to IR  Consult performed by: Ish Ames MD  Consult ordered by: Marina Hemphill DO        06/19/25    Assessment/Recommendation:   Will plan for replacement of right PCN and exchange of left retrograde PCNU today.    5-10 minutes, >50% of the total time devoted to medical consultative verbal/EMR discussion between providers. Written report will be generated in the EMR.     Thank you for allowing Interventional Radiology to participate in the care of Ragini Melendez. Please don't hesitate to call or TigerText us with any questions.     Ish Ames MD

## 2025-06-19 NOTE — ED NOTES
Patient is resting comfortably. Warm blankets provided to patient while she awaits IR consult. Gown provided to patient, she will change if IR requests she do so.     Meghan Falk RN  06/19/25 3255

## 2025-06-19 NOTE — ED PROVIDER NOTES
Time reflects when diagnosis was documented in both MDM as applicable and the Disposition within this note       Time User Action Codes Description Comment    6/19/2025  9:36 AM Marina Hemphill Add [T83.022A] Nephrostomy tube displaced (HCC)           ED Disposition       ED Disposition   Discharge    Condition   Stable    Date/Time   Thu Jun 19, 2025  9:36 AM    Comment   Ragini Melendez discharge to home/self care.                   Assessment & Plan       Medical Decision Making      ED Course as of 06/19/25 0939   Thu Jun 19, 2025   0814 Discussed with IR, Dr. Ames, they will replace the tube today.     IR replaced the nephrostomy tube without any issues.  Patient does not have any complaints afterwards, feels well.  Please see IR note separately.  Patient discharged home, return precautions were discussed.    Medications   lidocaine 1% buffered (10 mL Infiltration Given 6/19/25 0841)   iohexol (OMNIPAQUE) 350 MG/ML injection (SINGLE-DOSE) 25 mL (25 mL Other Given 6/19/25 0909)       ED Risk Strat Scores                    No data recorded                            History of Present Illness       Chief Complaint   Patient presents with    Medical Problem     Nephrostomy tube on right side came out       Past Medical History[1]   Past Surgical History[2]   Family History[3]   Social History[4]   E-Cigarette/Vaping    E-Cigarette Use Never User       E-Cigarette/Vaping Substances    Nicotine No     THC No     CBD No     Flavoring No     Other No     Unknown No       I have reviewed and agree with the history as documented.     69-year-old female with past medical history of endometrial cancer currently in remission who developed chronic ureteral obstruction secondary to radiation and has an ileal conduit.  Ureteral stent on the left, PCN on right.  Unfortunately the right PCN was pulled out today inadvertently by getting caught on something.  Denies any bleeding or pain.  No fevers or chills.  Has had the  nephrostomy tube on the right for 2 years.  States she was due to get it replaced tomorrow.  Denies any other complaints or issues.        Review of Systems   Constitutional:  Negative for chills and fever.   Respiratory:  Negative for cough and shortness of breath.    Gastrointestinal:  Negative for abdominal pain, nausea and vomiting.           Objective       ED Triage Vitals   Temperature Pulse Blood Pressure Respirations SpO2 Patient Position - Orthostatic VS   06/19/25 0742 06/19/25 0742 06/19/25 0743 06/19/25 0742 06/19/25 0742 06/19/25 0742   97.8 °F (36.6 °C) 73 141/67 18 96 % Sitting      Temp src Heart Rate Source BP Location FiO2 (%) Pain Score    -- 06/19/25 0742 06/19/25 0742 -- --     Monitor Right arm        Vitals      Date and Time Temp Pulse SpO2 Resp BP Pain Score FACES Pain Rating User   06/19/25 0745 -- 71 98 % 18 141/67 -- -- LC   06/19/25 0743 -- -- -- -- 141/67 -- -- SG   06/19/25 0742 97.8 °F (36.6 °C) 73 96 % 18 -- -- -- SG            Physical Exam  Constitutional:       General: She is not in acute distress.  HENT:      Head: Normocephalic.      Nose: Nose normal.      Mouth/Throat:      Mouth: Mucous membranes are moist.     Eyes:      Conjunctiva/sclera: Conjunctivae normal.       Cardiovascular:      Rate and Rhythm: Normal rate and regular rhythm.   Pulmonary:      Effort: Pulmonary effort is normal. No respiratory distress.   Abdominal:      General: There is no distension.      Palpations: Abdomen is soft.      Tenderness: There is no abdominal tenderness.      Comments: Ileal conduit with ostomy in place, right nephrostomy tube site appears patent with no active leakage or drainage or surrounding erythema, no tenderness to palpation.  No signs of infection.     Musculoskeletal:         General: No deformity. Normal range of motion.     Skin:     General: Skin is warm.      Findings: No erythema.     Neurological:      Mental Status: She is alert and oriented to person, place, and  time. Mental status is at baseline.         Results Reviewed       None            IR nephrostomy tube check/change/reposition/reinsertion/upsize    (Results Pending)       Procedures    ED Medication and Procedure Management   Prior to Admission Medications   Prescriptions Last Dose Informant Patient Reported? Taking?   ARIPiprazole (ABILIFY) 20 MG tablet  Self Yes No   Sig: Take 20 mg by mouth every morning   BD PosiFlush 0.9 % SOLN   Yes No   Calcium Carbonate (CALCIUM 500 PO)  Self Yes No   Sig: Take 500 mg by mouth in the morning   Calcium-Magnesium-Vitamin D (CALCIUM 500 PO)  Self Yes No   Sig: Take 1 capsule by mouth daily at bedtime   Multiple Vitamin (MULTIVITAMIN) tablet  Self Yes No   Sig: Take 1 tablet by mouth every morning   SODIUM CHLORIDE, EXTERNAL, 0.9 % SOLN   No No   Sig: 10 mL by Intracatheter route in the morning. Intracatheter flushing daily. May substitute prefilled syringe with normal saline 10 mL vials, 10 mL syringes, and 18 g blunt needles.   acetaminophen (TYLENOL) 325 mg tablet  Self No No   Sig: Take 2 tablets (650 mg total) by mouth every 6 (six) hours as needed for mild pain   calcitriol (ROCALTROL) 0.25 mcg capsule  Self No No   Sig: Take 1 capsule (0.25 mcg total) by mouth daily   calcium acetate (PHOSLO) capsule  Self No No   Sig: Take 2 capsules (1,334 mg total) by mouth 2 (two) times a day 2 capsules with lunch, 2 capsules with dinner   cetirizine (ZyrTEC) 10 mg tablet   No No   Sig: Take 1 tablet (10 mg total) by mouth daily   cholecalciferol (VITAMIN D3) 1,000 units tablet  Self No No   Sig: Take 4 tablets (4,000 Units total) by mouth daily   folic acid (FOLVITE) 1 mg tablet  Self No No   Sig: Take 1 tablet (1 mg total) by mouth daily   omeprazole (PriLOSEC) 20 mg delayed release capsule  Self Yes No   Sig: Take 20 mg by mouth as needed (GERD)   venlafaxine (EFFEXOR-XR) 75 mg 24 hr capsule  Self Yes No   Sig: Take by mouth      Facility-Administered Medications Last  Administration Doses Remaining   denosumab (PROLIA) subcutaneous injection 60 mg 5/28/2025 11:14 AM         Patient's Medications   Discharge Prescriptions    No medications on file     No discharge procedures on file.  ED SEPSIS DOCUMENTATION   Time reflects when diagnosis was documented in both MDM as applicable and the Disposition within this note       Time User Action Codes Description Comment    6/19/2025  9:36 AM Marina Hemphill Add [T83.022A] Nephrostomy tube displaced (HCC)                    [1]   Past Medical History:  Diagnosis Date    Anemia     Anxiety     Cancer (HCC)     ENDOMETRIAL    Chemotherapy induced neutropenia (HCC) 08/30/2019    Chronic kidney disease     CKD (chronic kidney disease) stage 3, GFR 30-59 ml/min (HCC)     COVID     Fall 2022    Depression     DVT (deep venous thrombosis) (HCC) 07/19/2019    RIGHT LEG    Endometrial cancer (HCC) 12/2017    GERD (gastroesophageal reflux disease)     H/O gastric bypass     Hard to intubate     pt denies AS OF 7/25/23    History of chemotherapy     started 12/2021endometrial cancer- done 12/23/22    History of DVT in adulthood     RLE    History of transfusion 04/13/2023    Hyperglycemia     vx type 2 dm -- last assessed 4/1/14; resolved 11/7/17    Hyperlipidemia     Hypertension     in past- no meds at present    Iron deficiency anemia     iron infusions weekly    Iron deficiency anemia secondary to inadequate dietary iron intake 02/08/2023    OAB (overactive bladder)     Port-A-Cath in place     Wears glasses    [2]   Past Surgical History:  Procedure Laterality Date    ABDOMINAL SURGERY      GASTRIC BYPASS; 2001    BREAST BIOPSY Right 06/28/2019    core biopsy; benign    CHOLECYSTECTOMY      at the time of gastric bypass    COLONOSCOPY      CT NEEDLE BIOPSY LYMPH NODE  07/08/2019    CYSTECTOMY, RADICAL WITH ILEOCONDUIT N/A 10/24/2024    Procedure: ILEAL CONDUIT urinary diversion (No cystectomy), BILATERAL UTERO-LYSIS;  Surgeon: Demetrius Warner  MD Mynor;  [facility];  Service: Urology    FL GUIDED CENTRAL VENOUS ACCESS DEVICE INSERTION  11/12/2019    FL RETROGRADE PYELOGRAM  03/30/2023    FL RETROGRADE PYELOGRAM  05/09/2023    FL RETROGRADE PYELOGRAM  8/1/2023    GASTRIC BYPASS      HYSTERECTOMY Bilateral 2017    total abdominal with salpingo-oophorectomy    IR NEPHROSTOMY TO NEPHROURETERAL STENT  06/09/2022    IR NEPHROSTOMY TO NEPHROURETERAL STENT  12/20/2022    IR NEPHROSTOMY TO NEPHROURETERAL STENT  8/8/2023    IR NEPHROSTOMY TO NEPHROURETERAL STENT  12/4/2024    IR NEPHROSTOMY TO NEPHROURETERAL STENT  3/6/2025    IR NEPHROSTOMY TO NEPHROURETERAL STENT  5/7/2025    IR NEPHROSTOMY TUBE CHECK/CHANGE/REPOSITION/REINSERTION/UPSIZE  05/27/2022    IR NEPHROSTOMY TUBE CHECK/CHANGE/REPOSITION/REINSERTION/UPSIZE  11/10/2023    IR NEPHROSTOMY TUBE CHECK/CHANGE/REPOSITION/REINSERTION/UPSIZE  2/9/2024    IR NEPHROSTOMY TUBE CHECK/CHANGE/REPOSITION/REINSERTION/UPSIZE  4/12/2024    IR NEPHROSTOMY TUBE CHECK/CHANGE/REPOSITION/REINSERTION/UPSIZE  7/12/2024    IR NEPHROSTOMY TUBE CHECK/CHANGE/REPOSITION/REINSERTION/UPSIZE  10/16/2024    IR NEPHROSTOMY TUBE CHECK/CHANGE/REPOSITION/REINSERTION/UPSIZE  11/6/2024    IR NEPHROSTOMY TUBE PLACEMENT  07/26/2019    IR NEPHROSTOMY TUBE PLACEMENT  05/27/2023    IR NEPHROURETERAL STENT CHECK/CHANGE/REPOSITION  04/06/2021    IR NEPHROURETERAL STENT CHECK/CHANGE/REPOSITION  06/04/2021    IR NEPHROURETERAL STENT CHECK/CHANGE/REPOSITION  09/03/2021    IR NEPHROURETERAL STENT CHECK/CHANGE/REPOSITION  12/07/2021    IR NEPHROURETERAL STENT CHECK/CHANGE/REPOSITION  03/08/2022    IR NEPHROURETERAL STENT CHECK/CHANGE/REPOSITION  05/10/2022    IR NEPHROURETERAL STENT CHECK/CHANGE/REPOSITION  09/19/2022    IR PICC LINE  09/27/2019    IR PORT PLACEMENT  07/26/2019    IR PORT PLACEMENT      IR PORT REMOVAL  09/20/2019    OOPHORECTOMY Bilateral 2017    SD CYSTO W/INSERT URETERAL STENT Right 03/30/2023    Procedure: EXCHANGE STENT  URETERAL;  Surgeon: Demetrius Lowe MD;  [facility];  Service: Urology    WI CYSTOURETHROSCOPY W/URETERAL CATHETERIZATION Right 03/30/2023    Procedure: CYSTOSCOPY RETROGRADE PYELOGRAM WITH exchange of STENT URETERAL;  Surgeon: Demetrius Lowe MD;  [facility];  Service: Urology    WI CYSTOURETHROSCOPY W/URETERAL CATHETERIZATION Bilateral 05/09/2023    Procedure: CYSTOSCOPY, BILATERAL RETROGRADE PYELOGRAM, WITH LEFT INSERTION STENT URETERAL, RIGHT URTERAL STENT EXCHANGE;  Surgeon: Nicola Hannah MD;  [facility];  Service: Urology    WI CYSTOURETHROSCOPY W/URETERAL CATHETERIZATION Bilateral 8/1/2023    Procedure: CYSTOSCOPY BILATERAL RETROGRADE  WITH REMOVAL BILATERAL  STENT URETERAL;  Surgeon: Demetrius Lowe MD;  [facility];  Service: Urology    WI INSJ TUNNELED CTR VAD W/SUBQ PORT AGE 5 YR/> Left 11/12/2019    Procedure: INSERTION VENOUS PORT ( PORT-A-CATH) IR;  Surgeon: Edilberto Guzman DO;  [facility];  Service: Interventional Radiology    WI LAPS ABD PRTM&OMENTUM DX W/WO SPEC BR/WA SPX N/A 12/19/2017    Procedure: LAPAROSCOPY DIAGNOSTIC;  Surgeon: Evgeny So MD;  [facility];  Service: Gynecology Oncology    WI LAPS W/RAD HYST W/BILAT LMPHADEC RMVL TUBE/OVARY N/A 12/19/2017    Procedure: HYSTERECTOMY LAPAROSCOPIC TOTAL (LTH) W/ ROBOTICS; BILATERAL SALPINGOOPHERECTOMY; LYMPH NODE DISSECTION; lysis of adhesions;  Surgeon: Evgeny So MD;  [facility];  Service: Gynecology Oncology    REMOVAL URETERAL STENT Bilateral 8/1/2023    Procedure: REMOVAL STENT URETERAL;  Surgeon: Demetrius Lowe MD;  [facility];  Service: Urology    TONSILLECTOMY      US GUIDED BREAST BIOPSY RIGHT COMPLETE Right 06/28/2019   [3]   Family History  Problem Relation Name Age of Onset    Hyperlipidemia Mother Emiliana     Heart disease Mother Emiliana     Ovarian cancer Mother Emiliana 50    Colon cancer Mother Emiliana      Lymphoma Father Car     Breast cancer Sister Nahomi Lemus    No Known Problems Brother Fernando     No Known Problems Brother Car     No Known Problems Maternal Grandmother      Bone cancer Maternal Grandfather      Uterine cancer Paternal Grandmother      No Known Problems Paternal Grandfather      No Known Problems Maternal Aunt gissell gould     No Known Problems Maternal Aunt gentry     No Known Problems Maternal Aunt virginia     No Known Problems Maternal Aunt hugh     No Known Problems Paternal Aunt ofelia     No Known Problems Paternal Aunt gissell     No Known Problems Paternal Aunt jd     No Known Problems Paternal Aunt laureen    [4]   Social History  Tobacco Use    Smoking status: Never    Smokeless tobacco: Never   Vaping Use    Vaping status: Never Used   Substance Use Topics    Alcohol use: Never    Drug use: Never        Marina Hemphill DO  06/19/25 0945

## 2025-06-20 ENCOUNTER — VBI (OUTPATIENT)
Dept: FAMILY MEDICINE CLINIC | Facility: CLINIC | Age: 70
End: 2025-06-20

## 2025-06-20 NOTE — TELEPHONE ENCOUNTER
06/20/25 10:45 AM    Patient contacted post ED visit, first outreach attempt made. Message was left for patient to return a call to the VBI Department at [facility]: Phone [phone].    Thank you.  Nancy Bolivar MA  [facility]

## 2025-06-23 ENCOUNTER — TELEPHONE (OUTPATIENT)
Dept: NEPHROLOGY | Facility: CLINIC | Age: 70
End: 2025-06-23

## 2025-06-23 ENCOUNTER — OFFICE VISIT (OUTPATIENT)
Dept: FAMILY MEDICINE CLINIC | Facility: CLINIC | Age: 70
End: 2025-06-23
Payer: COMMERCIAL

## 2025-06-23 ENCOUNTER — TELEPHONE (OUTPATIENT)
Age: 70
End: 2025-06-23

## 2025-06-23 VITALS
HEART RATE: 87 BPM | BODY MASS INDEX: 32.81 KG/M2 | HEIGHT: 58 IN | SYSTOLIC BLOOD PRESSURE: 112 MMHG | DIASTOLIC BLOOD PRESSURE: 78 MMHG | OXYGEN SATURATION: 98 % | TEMPERATURE: 98 F

## 2025-06-23 DIAGNOSIS — R39.9 UTI SYMPTOMS: Primary | ICD-10-CM

## 2025-06-23 DIAGNOSIS — Z98.890 H/O INSERTION OF NEPHROSTOMY TUBE: ICD-10-CM

## 2025-06-23 DIAGNOSIS — R35.0 URINE FREQUENCY: ICD-10-CM

## 2025-06-23 PROBLEM — N18.4 ANEMIA DUE TO STAGE 4 CHRONIC KIDNEY DISEASE  (HCC): Status: RESOLVED | Noted: 2024-10-24 | Resolved: 2025-06-23

## 2025-06-23 PROBLEM — D63.1 ANEMIA DUE TO STAGE 4 CHRONIC KIDNEY DISEASE  (HCC): Status: RESOLVED | Noted: 2024-10-24 | Resolved: 2025-06-23

## 2025-06-23 PROBLEM — R10.9 LEFT FLANK PAIN: Status: RESOLVED | Noted: 2025-05-06 | Resolved: 2025-06-23

## 2025-06-23 PROBLEM — Z78.9 NEED FOR FOLLOW-UP BY SOCIAL WORKER: Status: RESOLVED | Noted: 2021-03-12 | Resolved: 2025-06-23

## 2025-06-23 PROBLEM — M54.50 ACUTE LEFT-SIDED LOW BACK PAIN WITHOUT SCIATICA: Status: RESOLVED | Noted: 2020-08-06 | Resolved: 2025-06-23

## 2025-06-23 PROBLEM — N39.0 CHRONIC UTI: Status: RESOLVED | Noted: 2021-08-06 | Resolved: 2025-06-23

## 2025-06-23 PROBLEM — R60.0 BILATERAL LOWER EXTREMITY EDEMA: Status: RESOLVED | Noted: 2019-09-30 | Resolved: 2025-06-23

## 2025-06-23 PROBLEM — Z85.42 ENCOUNTER FOR FOLLOW-UP SURVEILLANCE OF ENDOMETRIAL CANCER: Status: RESOLVED | Noted: 2019-03-17 | Resolved: 2025-06-23

## 2025-06-23 PROBLEM — D64.9 ANEMIA: Status: RESOLVED | Noted: 2019-09-06 | Resolved: 2025-06-23

## 2025-06-23 PROBLEM — R07.89 LEFT-SIDED CHEST WALL PAIN: Status: RESOLVED | Noted: 2023-08-14 | Resolved: 2025-06-23

## 2025-06-23 PROBLEM — R10.30 LOWER ABDOMINAL PAIN: Status: RESOLVED | Noted: 2025-04-15 | Resolved: 2025-06-23

## 2025-06-23 PROBLEM — N18.9 CKD (CHRONIC KIDNEY DISEASE): Status: RESOLVED | Noted: 2024-05-02 | Resolved: 2025-06-23

## 2025-06-23 PROBLEM — Z01.810 PREOP CARDIOVASCULAR EXAM: Status: RESOLVED | Noted: 2023-07-13 | Resolved: 2025-06-23

## 2025-06-23 PROBLEM — R68.2 DRY MOUTH: Status: RESOLVED | Noted: 2023-02-03 | Resolved: 2025-06-23

## 2025-06-23 PROBLEM — R78.81 GRAM-NEGATIVE BACTEREMIA: Status: RESOLVED | Noted: 2020-01-08 | Resolved: 2025-06-23

## 2025-06-23 PROBLEM — Z08 ENCOUNTER FOR FOLLOW-UP SURVEILLANCE OF ENDOMETRIAL CANCER: Status: RESOLVED | Noted: 2019-03-17 | Resolved: 2025-06-23

## 2025-06-23 PROBLEM — N17.9 AKI (ACUTE KIDNEY INJURY) (HCC): Status: RESOLVED | Noted: 2019-09-19 | Resolved: 2025-06-23

## 2025-06-23 LAB
SL AMB  POCT GLUCOSE, UA: ABNORMAL
SL AMB LEUKOCYTE ESTERASE,UA: 500
SL AMB POCT BILIRUBIN,UA: ABNORMAL
SL AMB POCT BLOOD,UA: 250
SL AMB POCT CLARITY,UA: ABNORMAL
SL AMB POCT COLOR,UA: YELLOW
SL AMB POCT KETONES,UA: ABNORMAL
SL AMB POCT NITRITE,UA: ABNORMAL
SL AMB POCT PH,UA: 8
SL AMB POCT SPECIFIC GRAVITY,UA: 1
SL AMB POCT URINE PROTEIN: 100
SL AMB POCT UROBILINOGEN: ABNORMAL

## 2025-06-23 PROCEDURE — 99214 OFFICE O/P EST MOD 30 MIN: CPT

## 2025-06-23 PROCEDURE — 81003 URINALYSIS AUTO W/O SCOPE: CPT

## 2025-06-23 NOTE — PROGRESS NOTES
Name: Ragini Melendez      : 1955      MRN: [id number]  Encounter Provider: RAFITA Melgoza  Encounter Date: 2025   Encounter department: [facility] [place]  :  Assessment & Plan  UTI symptoms  POC urine reveals moderate leukocytes. Will start on Augmentin and send urine culture. Will f/u once culture results received if need to change antibiotic. ER precautions discussed - fevers, chills, worsening symptoms - no relief from antibiotics. Patient verbalizes understanding. Will call nephrologist following today's visit.   Orders:    amoxicillin-clavulanate (AUGMENTIN) 875-125 mg per tablet; Take 1 tablet by mouth every 12 (twelve) hours for 7 days    Urine culture; Future    H/O insertion of nephrostomy tube  Reports pain where nephrostomy tube is inserted - started shortly after insertion. Recommend reaching out to nephrologist regarding symptoms, will start on abx in interim.               History of Present Illness   69 year old female presents for UTI symptoms - urinary frequency and right sided lower back pain - over insertion of nephrostomy tube. PMH significant for CKD stage 4, endometrial cancer, proteinuria, and recurrent UTI's. She does have a nephrostomy and ileul conduit in place for urine output. She does follow with urology for management of this. She reports last week her nephrostomy tube was dislodged and she went to the ER for re-insertion. She reports pain at insertion site since then. She reports mild nausea and increased urinary output from her ostomy site. She denies fevers, chills, or hematuria. She has not tried any interventions for symptoms or called her urologist regarding symptoms. Last treated for pyelonephritis last month with Augmentin.     Urinary Frequency   This is a recurrent problem. The current episode started in the past 7 days. The problem occurs intermittently. The problem has been waxing and waning. The quality of the pain is described as  "aching. The pain is at a severity of 5/10. The pain is moderate. There has been no fever. She is Not sexually active. There is A history of pyelonephritis. Associated symptoms include frequency and urgency. Pertinent negatives include no chills, discharge, flank pain, hematuria, hesitancy, nausea, possible pregnancy, sweats or vomiting. She has tried home medications for the symptoms. The treatment provided no relief. Her past medical history is significant for catheterization, recurrent UTIs and a urological procedure.     Review of Systems   Constitutional:  Negative for activity change, appetite change, chills, fatigue and fever.   HENT:  Negative for ear pain and sore throat.    Eyes:  Negative for pain and visual disturbance.   Respiratory:  Negative for cough, chest tightness and shortness of breath.    Cardiovascular:  Negative for chest pain and palpitations.   Gastrointestinal:  Negative for abdominal pain, constipation, diarrhea, nausea and vomiting.   Genitourinary:  Positive for frequency and urgency. Negative for decreased urine volume, difficulty urinating, dyspareunia, dysuria, enuresis, flank pain, genital sores, hematuria, hesitancy, menstrual problem, pelvic pain, vaginal bleeding, vaginal discharge and vaginal pain.   Musculoskeletal:  Positive for back pain. Negative for arthralgias, myalgias and neck pain.   Skin:  Negative for color change, pallor and rash.   Allergic/Immunologic: Negative for environmental allergies and food allergies.   Neurological:  Negative for dizziness, seizures, syncope, light-headedness and headaches.   All other systems reviewed and are negative.      Objective   /78   Pulse 87   Temp 98 °F (36.7 °C)   Ht 4' 10\" (1.473 m)   LMP  (LMP Unknown)   SpO2 98%   BMI 32.81 kg/m²      Physical Exam  Vitals and nursing note reviewed.   Constitutional:       General: She is awake. She is not in acute distress.     Appearance: Normal appearance. She is well-developed " and normal weight.   HENT:      Head: Normocephalic and atraumatic.     Eyes:      Conjunctiva/sclera: Conjunctivae normal.       Cardiovascular:      Rate and Rhythm: Normal rate and regular rhythm.      Pulses: Normal pulses.      Heart sounds: No murmur heard.  Pulmonary:      Effort: Pulmonary effort is normal. No respiratory distress.      Breath sounds: Normal breath sounds.   Abdominal:      General: Abdomen is flat. Bowel sounds are normal.      Palpations: Abdomen is soft.      Tenderness: There is no abdominal tenderness. There is no right CVA tenderness, left CVA tenderness or guarding.     Musculoskeletal:         General: No swelling. Normal range of motion.      Cervical back: Neck supple.     Skin:     General: Skin is warm and dry.      Capillary Refill: Capillary refill takes less than 2 seconds.     Neurological:      General: No focal deficit present.      Mental Status: She is alert and oriented to person, place, and time.     Psychiatric:         Mood and Affect: Mood normal.         Behavior: Behavior normal. Behavior is cooperative.         Thought Content: Thought content normal.         Judgment: Judgment normal.

## 2025-06-23 NOTE — TELEPHONE ENCOUNTER
PCP treating patient for UTI but was advised to call us due to some redness to right nephrostomy area. Has intermittent pain at site, pain level 3 out of 10. Redness and discomfort at site started 3 days ago. Her UTI symptoms also started 3 days ago, symptoms include cloudy urine, weakness, pressure around nephrostomy site    Started her on Augmentin    Please see OV notes from today which also have a photo    Denies fever

## 2025-06-23 NOTE — PROGRESS NOTES
"Name: Ragini Melendez      : 1955      MRN: [id number]  Encounter Provider: RAFITA Melgoza  Encounter Date: 2025   Encounter department: [facility] [place]  :  Assessment & Plan  Urine frequency    Orders:  •  POCT urine dip    UTI symptoms                History of Present Illness   Urinary Frequency   Associated symptoms include frequency and urgency. Pertinent negatives include no chills, hematuria, nausea or vomiting.     Review of Systems   Constitutional:  Negative for activity change, appetite change, chills, fatigue and fever.   Respiratory:  Negative for cough, chest tightness and shortness of breath.    Cardiovascular:  Negative for chest pain and palpitations.   Gastrointestinal:  Negative for abdominal pain, constipation, diarrhea, nausea and vomiting.   Genitourinary:  Positive for frequency and urgency. Negative for decreased urine volume, difficulty urinating, dyspareunia, dysuria, genital sores, hematuria, menstrual problem, pelvic pain, vaginal bleeding, vaginal discharge and vaginal pain.   Musculoskeletal:  Positive for back pain. Negative for arthralgias, myalgias and neck pain.   Skin:  Negative for color change and pallor.   Allergic/Immunologic: Negative for environmental allergies and food allergies.   Neurological:  Negative for dizziness, light-headedness and headaches.       Objective   /78   Pulse 87   Temp 98 °F (36.7 °C)   Ht 4' 10\" (1.473 m)   LMP  (LMP Unknown)   SpO2 98%   BMI 32.81 kg/m²      Physical Exam  Vitals and nursing note reviewed.   Constitutional:       General: She is awake.      Appearance: Normal appearance. She is well-developed and normal weight.   HENT:      Head: Normocephalic and atraumatic.     Cardiovascular:      Pulses: Normal pulses.      Heart sounds: Normal heart sounds.   Pulmonary:      Effort: Pulmonary effort is normal.      Breath sounds: Normal breath sounds.   Abdominal:      General: Abdomen is flat.    "   Palpations: Abdomen is soft.      Tenderness: There is abdominal tenderness in the right lower quadrant. There is no right CVA tenderness, left CVA tenderness or guarding.     Neurological:      General: No focal deficit present.      Mental Status: She is alert and oriented to person, place, and time.     Psychiatric:         Mood and Affect: Mood normal.         Behavior: Behavior normal. Behavior is cooperative.         Thought Content: Thought content normal.         Judgment: Judgment normal.

## 2025-06-23 NOTE — TELEPHONE ENCOUNTER
Reviewed picture not overall concerning.  Continue course of Augmentin.  She did recently have this exchanged on 6/19 which most likely is contributing to the pain and mild redness noted.

## 2025-06-23 NOTE — TELEPHONE ENCOUNTER
06/23/25 9:36 AM    Patient contacted post ED visit, VBI department spoke with patient/caregiver and outreach was successful.    Thank you.  Nancy Bolivar MA  [facility]

## 2025-06-23 NOTE — TELEPHONE ENCOUNTER
Left msg with pt to call back and reschedule 8/25 appt with Lupe due to a provider schedule change. Please transfer call to [place] for scheduling.

## 2025-07-02 ENCOUNTER — HOSPITAL ENCOUNTER (OUTPATIENT)
Dept: INFUSION CENTER | Facility: CLINIC | Age: 70
Discharge: HOME | End: 2025-07-02
Payer: COMMERCIAL

## 2025-07-02 DIAGNOSIS — C54.1 ENDOMETRIAL CANCER (HCC): ICD-10-CM

## 2025-07-02 DIAGNOSIS — Z45.2 ENCOUNTER FOR CENTRAL LINE CARE: Primary | ICD-10-CM

## 2025-07-02 PROCEDURE — 96523 IRRIG DRUG DELIVERY DEVICE: CPT

## 2025-07-02 NOTE — PROGRESS NOTES
Patient here for catheter maintenance, she is well, no c/o or changes to report. She tolerated procedure without incident and was discharged. Next appt for same 8/13/25.

## 2025-07-02 NOTE — PATIENT INSTRUCTIONS
July 2025 Sunday Monday Tuesday Wednesday Thursday Friday Saturday             1     2    Catheter Maintenance   1:30 PM   (60 min.)   [facility] FAST TRACK 1   [facility] 3     4     5       6     7     8     9     10     11     12       13     14     15     16     17     18     19       20     21     22     23     24     25     26       27     28     29     30     31

## 2025-07-08 ENCOUNTER — OFFICE VISIT (OUTPATIENT)
Dept: FAMILY MEDICINE CLINIC | Facility: CLINIC | Age: 70
End: 2025-07-08
Payer: COMMERCIAL

## 2025-07-08 VITALS
TEMPERATURE: 99.1 F | HEIGHT: 58 IN | BODY MASS INDEX: 32.95 KG/M2 | WEIGHT: 157 LBS | DIASTOLIC BLOOD PRESSURE: 80 MMHG | SYSTOLIC BLOOD PRESSURE: 125 MMHG | HEART RATE: 72 BPM

## 2025-07-08 DIAGNOSIS — T83.512D URINARY TRACT INFECTION ASSOCIATED WITH NEPHROSTOMY CATHETER, SUBSEQUENT ENCOUNTER: ICD-10-CM

## 2025-07-08 DIAGNOSIS — R30.0 DYSURIA: Primary | ICD-10-CM

## 2025-07-08 DIAGNOSIS — N18.4 CKD (CHRONIC KIDNEY DISEASE) STAGE 4, GFR 15-29 ML/MIN (HCC): ICD-10-CM

## 2025-07-08 DIAGNOSIS — I10 BENIGN ESSENTIAL HYPERTENSION: ICD-10-CM

## 2025-07-08 DIAGNOSIS — Z93.6 NEPHROSTOMY STATUS (HCC): ICD-10-CM

## 2025-07-08 DIAGNOSIS — N39.0 URINARY TRACT INFECTION ASSOCIATED WITH NEPHROSTOMY CATHETER, SUBSEQUENT ENCOUNTER: ICD-10-CM

## 2025-07-08 PROBLEM — T83.512A URINARY TRACT INFECTION ASSOCIATED WITH NEPHROSTOMY CATHETER  (HCC): Status: ACTIVE | Noted: 2020-08-06

## 2025-07-08 LAB
SL AMB  POCT GLUCOSE, UA: ABNORMAL
SL AMB LEUKOCYTE ESTERASE,UA: 500
SL AMB POCT BILIRUBIN,UA: ABNORMAL
SL AMB POCT BLOOD,UA: 250
SL AMB POCT CLARITY,UA: ABNORMAL
SL AMB POCT COLOR,UA: ABNORMAL
SL AMB POCT KETONES,UA: ABNORMAL
SL AMB POCT NITRITE,UA: ABNORMAL
SL AMB POCT PH,UA: 8
SL AMB POCT SPECIFIC GRAVITY,UA: 1.01
SL AMB POCT URINE PROTEIN: 100
SL AMB POCT UROBILINOGEN: ABNORMAL

## 2025-07-08 PROCEDURE — 81003 URINALYSIS AUTO W/O SCOPE: CPT | Performed by: FAMILY MEDICINE

## 2025-07-08 PROCEDURE — 99214 OFFICE O/P EST MOD 30 MIN: CPT | Performed by: FAMILY MEDICINE

## 2025-07-08 RX ORDER — SULFAMETHOXAZOLE AND TRIMETHOPRIM 800; 160 MG/1; MG/1
1 TABLET ORAL EVERY 12 HOURS SCHEDULED
Qty: 14 TABLET | Refills: 0 | Status: SHIPPED | OUTPATIENT
Start: 2025-07-08 | End: 2025-07-15

## 2025-07-08 NOTE — PROGRESS NOTES
"Assessment:      UTI, has neprostomy tube   And urine bag      Plan:  Plan:   Advised to call her urologist, she has nephrostomy tube present, start the antibiotic as she is feeling weak tired and urine is showing positive infection, keep up with the fluids   1. Medications: TMP/SMX  2. Maintain adequate hydration  3. Follow up if symptoms not improving, and prn.     Subjective:      Ragini Melendez is a 69 y.o. female who complains of pain in the left flank for 7 days.  Patient also complains of back pain. Patient denies back pain.  Patient does not have a history of recurrent UTI.  Patient does not have a history of pyelonephritis.  The following portions of the patient's history were reviewed and updated as appropriate: allergies, current medications, past family history, past medical history, past social history, past surgical history, and problem list.  She has nephrostomy tube present, she has chronic kidney disease and high creatinine,  Review of Systems  A comprehensive review of systems was negative except for: Constitutional: positive for fatigue      Objective:      /80   Pulse 72   Temp 99.1 °F (37.3 °C)   Ht 4' 10\" (1.473 m)   Wt 71.2 kg (157 lb)   LMP  (LMP Unknown)   BMI 32.81 kg/m²   General: alert and oriented, in no acute distress   Abdomen: soft, non-tender, without masses or organomegaly in the left flank   Back: CVA tenderness absent   : Nephrostomy catheter present     Laboratory:   Urine dipstick shows 2+ for leukocyte esterase.    Micro exam: . WBCs per HPF.        "

## 2025-07-26 ENCOUNTER — APPOINTMENT (EMERGENCY)
Dept: CT IMAGING | Facility: HOSPITAL | Age: 70
DRG: 673 | End: 2025-07-26
Payer: COMMERCIAL

## 2025-07-26 ENCOUNTER — HOSPITAL ENCOUNTER (INPATIENT)
Facility: HOSPITAL | Age: 70
LOS: 11 days | Discharge: NURSING, REHAB OR LONG-TERM CARE | DRG: 673 | End: 2025-08-06
Attending: EMERGENCY MEDICINE | Admitting: INTERNAL MEDICINE
Payer: COMMERCIAL

## 2025-07-26 DIAGNOSIS — D64.9 SYMPTOMATIC ANEMIA: ICD-10-CM

## 2025-07-26 DIAGNOSIS — Z95.828 S/P INSERTION OF ILIAC ARTERY STENT: ICD-10-CM

## 2025-07-26 DIAGNOSIS — R31.9 HEMATURIA: ICD-10-CM

## 2025-07-26 DIAGNOSIS — R31.9 URINARY TRACT INFECTION WITH HEMATURIA, SITE UNSPECIFIED: ICD-10-CM

## 2025-07-26 DIAGNOSIS — N39.0 URINARY TRACT INFECTION WITH HEMATURIA, SITE UNSPECIFIED: ICD-10-CM

## 2025-07-26 DIAGNOSIS — E87.20 ACIDOSIS: ICD-10-CM

## 2025-07-26 DIAGNOSIS — Z93.6 NEPHROSTOMY STATUS (HCC): ICD-10-CM

## 2025-07-26 DIAGNOSIS — K59.00 CONSTIPATION: ICD-10-CM

## 2025-07-26 DIAGNOSIS — Z93.6 NEPHROSTOMY STATUS (HCC): Primary | ICD-10-CM

## 2025-07-26 DIAGNOSIS — N17.9 ACUTE KIDNEY INJURY (HCC): ICD-10-CM

## 2025-07-26 DIAGNOSIS — N17.9 AKI (ACUTE KIDNEY INJURY) (HCC): ICD-10-CM

## 2025-07-26 DIAGNOSIS — D62 ACUTE BLOOD LOSS ANEMIA: ICD-10-CM

## 2025-07-26 DIAGNOSIS — G47.00 INSOMNIA: ICD-10-CM

## 2025-07-26 DIAGNOSIS — T83.9XXA PROBLEM WITH URINARY CATHETER (HCC): ICD-10-CM

## 2025-07-26 LAB
ABO GROUP BLD: NORMAL
ALBUMIN SERPL BCG-MCNC: 3.3 G/DL (ref 3.5–5)
ALP SERPL-CCNC: 56 U/L (ref 34–104)
ALT SERPL W P-5'-P-CCNC: 31 U/L (ref 7–52)
ANION GAP SERPL CALCULATED.3IONS-SCNC: 10 MMOL/L (ref 4–13)
AST SERPL W P-5'-P-CCNC: 28 U/L (ref 13–39)
BACTERIA UR QL AUTO: ABNORMAL /HPF
BACTERIA UR QL AUTO: ABNORMAL /HPF
BASE EX.OXY STD BLDV CALC-SCNC: 95.7 % (ref 60–80)
BASE EXCESS BLDV CALC-SCNC: -11.4 MMOL/L
BASOPHILS # BLD AUTO: 0.03 THOUSANDS/ÂΜL (ref 0–0.1)
BASOPHILS NFR BLD AUTO: 0 % (ref 0–1)
BILIRUB SERPL-MCNC: 0.28 MG/DL (ref 0.2–1)
BILIRUB UR QL STRIP: NEGATIVE
BILIRUB UR QL STRIP: NEGATIVE
BLD GP AB SCN SERPL QL: NEGATIVE
BUN SERPL-MCNC: 55 MG/DL (ref 5–25)
CALCIUM ALBUM COR SERPL-MCNC: 6.7 MG/DL (ref 8.3–10.1)
CALCIUM SERPL-MCNC: 6.1 MG/DL (ref 8.4–10.2)
CHLORIDE SERPL-SCNC: 112 MMOL/L (ref 96–108)
CLARITY UR: ABNORMAL
CLARITY UR: ABNORMAL
CO2 SERPL-SCNC: 15 MMOL/L (ref 21–32)
COLOR UR: ABNORMAL
COLOR UR: COLORLESS
CREAT SERPL-MCNC: 3.21 MG/DL (ref 0.6–1.3)
EOSINOPHIL # BLD AUTO: 0.03 THOUSAND/ÂΜL (ref 0–0.61)
EOSINOPHIL NFR BLD AUTO: 0 % (ref 0–6)
ERYTHROCYTE [DISTWIDTH] IN BLOOD BY AUTOMATED COUNT: 15.8 % (ref 11.6–15.1)
GFR SERPL CREATININE-BSD FRML MDRD: 14 ML/MIN/1.73SQ M
GLUCOSE SERPL-MCNC: 85 MG/DL (ref 65–140)
GLUCOSE UR STRIP-MCNC: NEGATIVE MG/DL
GLUCOSE UR STRIP-MCNC: NEGATIVE MG/DL
HCO3 BLDV-SCNC: 13.7 MMOL/L (ref 24–30)
HCT VFR BLD AUTO: 25.4 % (ref 34.8–46.1)
HCT VFR BLD AUTO: 33 % (ref 34.8–46.1)
HGB BLD-MCNC: 10.2 G/DL (ref 11.5–15.4)
HGB BLD-MCNC: 7.7 G/DL (ref 11.5–15.4)
HGB UR QL STRIP.AUTO: ABNORMAL
HGB UR QL STRIP.AUTO: ABNORMAL
IMM GRANULOCYTES # BLD AUTO: 0.09 THOUSAND/UL (ref 0–0.2)
IMM GRANULOCYTES NFR BLD AUTO: 1 % (ref 0–2)
KETONES UR STRIP-MCNC: NEGATIVE MG/DL
KETONES UR STRIP-MCNC: NEGATIVE MG/DL
LACTATE SERPL-SCNC: 0.5 MMOL/L (ref 0.5–2)
LEUKOCYTE ESTERASE UR QL STRIP: ABNORMAL
LEUKOCYTE ESTERASE UR QL STRIP: ABNORMAL
LYMPHOCYTES # BLD AUTO: 1.16 THOUSANDS/ÂΜL (ref 0.6–4.47)
LYMPHOCYTES NFR BLD AUTO: 12 % (ref 14–44)
MCH RBC QN AUTO: 28.2 PG (ref 26.8–34.3)
MCHC RBC AUTO-ENTMCNC: 30.3 G/DL (ref 31.4–37.4)
MCV RBC AUTO: 93 FL (ref 82–98)
MONOCYTES # BLD AUTO: 0.94 THOUSAND/ÂΜL (ref 0.17–1.22)
MONOCYTES NFR BLD AUTO: 9 % (ref 4–12)
NEUTROPHILS # BLD AUTO: 7.8 THOUSANDS/ÂΜL (ref 1.85–7.62)
NEUTS SEG NFR BLD AUTO: 78 % (ref 43–75)
NITRITE UR QL STRIP: NEGATIVE
NITRITE UR QL STRIP: NEGATIVE
NON-SQ EPI CELLS URNS QL MICRO: ABNORMAL /HPF
NON-SQ EPI CELLS URNS QL MICRO: ABNORMAL /HPF
NRBC BLD AUTO-RTO: 0 /100 WBCS
O2 CT BLDV-SCNC: 13 ML/DL
PCO2 BLDV: 28 MM HG (ref 42–50)
PH BLDV: 7.31 [PH] (ref 7.3–7.4)
PH UR STRIP.AUTO: 6.5 [PH]
PH UR STRIP.AUTO: 7 [PH]
PLATELET # BLD AUTO: 206 THOUSANDS/UL (ref 149–390)
PLATELET # BLD AUTO: 226 THOUSANDS/UL (ref 149–390)
PMV BLD AUTO: 10.3 FL (ref 8.9–12.7)
PMV BLD AUTO: 9.7 FL (ref 8.9–12.7)
PO2 BLDV: 151.8 MM HG (ref 35–45)
POTASSIUM SERPL-SCNC: 4.1 MMOL/L (ref 3.5–5.3)
PROT SERPL-MCNC: 7 G/DL (ref 6.4–8.4)
PROT UR STRIP-MCNC: ABNORMAL MG/DL
PROT UR STRIP-MCNC: ABNORMAL MG/DL
RBC # BLD AUTO: 2.73 MILLION/UL (ref 3.81–5.12)
RBC #/AREA URNS AUTO: ABNORMAL /HPF
RBC #/AREA URNS AUTO: ABNORMAL /HPF
RH BLD: POSITIVE
SODIUM SERPL-SCNC: 137 MMOL/L (ref 135–147)
SP GR UR STRIP.AUTO: 1.01 (ref 1–1.03)
SP GR UR STRIP.AUTO: 1.01 (ref 1–1.03)
SPECIMEN EXPIRATION DATE: NORMAL
UROBILINOGEN UR STRIP-ACNC: <2 MG/DL
UROBILINOGEN UR STRIP-ACNC: <2 MG/DL
WBC # BLD AUTO: 10.05 THOUSAND/UL (ref 4.31–10.16)
WBC #/AREA URNS AUTO: ABNORMAL /HPF
WBC #/AREA URNS AUTO: ABNORMAL /HPF
WBC CLUMPS # UR AUTO: PRESENT /UL
WBC CLUMPS # UR AUTO: PRESENT /UL

## 2025-07-26 PROCEDURE — 85049 AUTOMATED PLATELET COUNT: CPT

## 2025-07-26 PROCEDURE — 86901 BLOOD TYPING SEROLOGIC RH(D): CPT | Performed by: EMERGENCY MEDICINE

## 2025-07-26 PROCEDURE — 36430 TRANSFUSION BLD/BLD COMPNT: CPT

## 2025-07-26 PROCEDURE — 86923 COMPATIBILITY TEST ELECTRIC: CPT

## 2025-07-26 PROCEDURE — 83605 ASSAY OF LACTIC ACID: CPT | Performed by: EMERGENCY MEDICINE

## 2025-07-26 PROCEDURE — 82803 BLOOD GASES ANY COMBINATION: CPT

## 2025-07-26 PROCEDURE — 85014 HEMATOCRIT: CPT

## 2025-07-26 PROCEDURE — 82947 ASSAY GLUCOSE BLOOD QUANT: CPT

## 2025-07-26 PROCEDURE — 87040 BLOOD CULTURE FOR BACTERIA: CPT | Performed by: EMERGENCY MEDICINE

## 2025-07-26 PROCEDURE — 85018 HEMOGLOBIN: CPT

## 2025-07-26 PROCEDURE — 87186 SC STD MICRODIL/AGAR DIL: CPT

## 2025-07-26 PROCEDURE — 96374 THER/PROPH/DIAG INJ IV PUSH: CPT

## 2025-07-26 PROCEDURE — 87077 CULTURE AEROBIC IDENTIFY: CPT

## 2025-07-26 PROCEDURE — 36415 COLL VENOUS BLD VENIPUNCTURE: CPT | Performed by: EMERGENCY MEDICINE

## 2025-07-26 PROCEDURE — 30233N1 TRANSFUSION OF NONAUTOLOGOUS RED BLOOD CELLS INTO PERIPHERAL VEIN, PERCUTANEOUS APPROACH: ICD-10-PCS

## 2025-07-26 PROCEDURE — 82805 BLOOD GASES W/O2 SATURATION: CPT

## 2025-07-26 PROCEDURE — 82330 ASSAY OF CALCIUM: CPT

## 2025-07-26 PROCEDURE — 81001 URINALYSIS AUTO W/SCOPE: CPT

## 2025-07-26 PROCEDURE — 85025 COMPLETE CBC W/AUTO DIFF WBC: CPT

## 2025-07-26 PROCEDURE — 86900 BLOOD TYPING SEROLOGIC ABO: CPT | Performed by: EMERGENCY MEDICINE

## 2025-07-26 PROCEDURE — 87086 URINE CULTURE/COLONY COUNT: CPT

## 2025-07-26 PROCEDURE — P9016 RBC LEUKOCYTES REDUCED: HCPCS

## 2025-07-26 PROCEDURE — 96365 THER/PROPH/DIAG IV INF INIT: CPT

## 2025-07-26 PROCEDURE — 99284 EMERGENCY DEPT VISIT MOD MDM: CPT

## 2025-07-26 PROCEDURE — 86850 RBC ANTIBODY SCREEN: CPT | Performed by: EMERGENCY MEDICINE

## 2025-07-26 PROCEDURE — 74174 CTA ABD&PLVS W/CONTRAST: CPT

## 2025-07-26 PROCEDURE — 80053 COMPREHEN METABOLIC PANEL: CPT

## 2025-07-26 PROCEDURE — 84295 ASSAY OF SERUM SODIUM: CPT

## 2025-07-26 PROCEDURE — 99291 CRITICAL CARE FIRST HOUR: CPT | Performed by: EMERGENCY MEDICINE

## 2025-07-26 PROCEDURE — 84132 ASSAY OF SERUM POTASSIUM: CPT

## 2025-07-26 RX ORDER — CALCIUM ACETATE 667 MG/1
1334 CAPSULE ORAL 2 TIMES DAILY
Status: DISCONTINUED | OUTPATIENT
Start: 2025-07-27 | End: 2025-08-06 | Stop reason: HOSPADM

## 2025-07-26 RX ORDER — ARIPIPRAZOLE 5 MG/1
20 TABLET ORAL EVERY MORNING
Status: DISCONTINUED | OUTPATIENT
Start: 2025-07-27 | End: 2025-08-06 | Stop reason: HOSPADM

## 2025-07-26 RX ORDER — CALCITRIOL 0.25 UG/1
0.25 CAPSULE, LIQUID FILLED ORAL DAILY
Status: DISCONTINUED | OUTPATIENT
Start: 2025-07-27 | End: 2025-08-06 | Stop reason: HOSPADM

## 2025-07-26 RX ORDER — PANTOPRAZOLE SODIUM 20 MG/1
20 TABLET, DELAYED RELEASE ORAL
Status: DISCONTINUED | OUTPATIENT
Start: 2025-07-27 | End: 2025-08-06 | Stop reason: HOSPADM

## 2025-07-26 RX ORDER — HEPARIN SODIUM 5000 [USP'U]/ML
5000 INJECTION, SOLUTION INTRAVENOUS; SUBCUTANEOUS EVERY 8 HOURS SCHEDULED
Status: DISCONTINUED | OUTPATIENT
Start: 2025-07-27 | End: 2025-08-05

## 2025-07-26 RX ORDER — LORATADINE 10 MG/1
10 TABLET ORAL DAILY
Status: DISCONTINUED | OUTPATIENT
Start: 2025-07-27 | End: 2025-08-06 | Stop reason: HOSPADM

## 2025-07-26 RX ORDER — VENLAFAXINE HYDROCHLORIDE 37.5 MG/1
75 CAPSULE, EXTENDED RELEASE ORAL DAILY
Status: DISCONTINUED | OUTPATIENT
Start: 2025-07-27 | End: 2025-08-06 | Stop reason: HOSPADM

## 2025-07-26 RX ORDER — FOLIC ACID 1 MG/1
1 TABLET ORAL DAILY
Status: DISCONTINUED | OUTPATIENT
Start: 2025-07-27 | End: 2025-08-06 | Stop reason: HOSPADM

## 2025-07-26 RX ORDER — HALOPERIDOL 5 MG/ML
2 INJECTION INTRAMUSCULAR ONCE
Status: DISCONTINUED | OUTPATIENT
Start: 2025-07-26 | End: 2025-07-26

## 2025-07-26 RX ORDER — TRANEXAMIC ACID 10 MG/ML
1000 INJECTION, SOLUTION INTRAVENOUS ONCE
Status: COMPLETED | OUTPATIENT
Start: 2025-07-26 | End: 2025-07-26

## 2025-07-26 RX ORDER — SODIUM CHLORIDE, SODIUM GLUCONATE, SODIUM ACETATE, POTASSIUM CHLORIDE, MAGNESIUM CHLORIDE, SODIUM PHOSPHATE, DIBASIC, AND POTASSIUM PHOSPHATE .53; .5; .37; .037; .03; .012; .00082 G/100ML; G/100ML; G/100ML; G/100ML; G/100ML; G/100ML; G/100ML
100 INJECTION, SOLUTION INTRAVENOUS CONTINUOUS
Status: DISPENSED | OUTPATIENT
Start: 2025-07-26 | End: 2025-07-27

## 2025-07-26 RX ADMIN — SODIUM BICARBONATE 100 ML/HR: 84 INJECTION, SOLUTION INTRAVENOUS at 20:57

## 2025-07-26 RX ADMIN — TRANEXAMIC ACID 1000 MG: 10 INJECTION, SOLUTION INTRAVENOUS at 19:06

## 2025-07-26 RX ADMIN — CEFEPIME 1000 MG: 1 INJECTION, POWDER, FOR SOLUTION INTRAMUSCULAR; INTRAVENOUS at 19:43

## 2025-07-26 RX ADMIN — IOHEXOL 60 ML: 350 INJECTION, SOLUTION INTRAVENOUS at 19:00

## 2025-07-26 RX ADMIN — SODIUM CHLORIDE, SODIUM GLUCONATE, SODIUM ACETATE, POTASSIUM CHLORIDE, MAGNESIUM CHLORIDE, SODIUM PHOSPHATE, DIBASIC, AND POTASSIUM PHOSPHATE 125 ML/HR: .53; .5; .37; .037; .03; .012; .00082 INJECTION, SOLUTION INTRAVENOUS at 23:50

## 2025-07-27 PROBLEM — E83.39 HYPERPHOSPHATEMIA: Status: ACTIVE | Noted: 2025-07-27

## 2025-07-27 PROBLEM — E83.51 HYPOCALCEMIA: Status: ACTIVE | Noted: 2025-07-27

## 2025-07-27 PROBLEM — N17.9 ACUTE KIDNEY INJURY (HCC): Status: ACTIVE | Noted: 2025-07-27

## 2025-07-27 PROBLEM — E83.42 HYPOMAGNESEMIA: Status: ACTIVE | Noted: 2025-07-27

## 2025-07-27 PROBLEM — D64.9 ANEMIA: Status: ACTIVE | Noted: 2025-07-27

## 2025-07-27 PROBLEM — E87.6 HYPOKALEMIA: Status: ACTIVE | Noted: 2025-07-27

## 2025-07-27 PROBLEM — N18.4 CHRONIC KIDNEY DISEASE, STAGE IV (SEVERE) (HCC): Status: ACTIVE | Noted: 2025-07-27

## 2025-07-27 PROBLEM — R79.89 ELEVATED SERUM CREATININE: Status: ACTIVE | Noted: 2025-07-27

## 2025-07-27 PROBLEM — E87.20 COMPENSATED METABOLIC ACIDOSIS: Status: ACTIVE | Noted: 2025-07-27

## 2025-07-27 LAB
ABO GROUP BLD BPU: NORMAL
ANION GAP SERPL CALCULATED.3IONS-SCNC: 13 MMOL/L (ref 4–13)
ANION GAP SERPL CALCULATED.3IONS-SCNC: 13 MMOL/L (ref 4–13)
BASOPHILS # BLD AUTO: 0.05 THOUSANDS/ÂΜL (ref 0–0.1)
BASOPHILS NFR BLD AUTO: 0 % (ref 0–1)
BPU ID: NORMAL
BUN SERPL-MCNC: 50 MG/DL (ref 5–25)
BUN SERPL-MCNC: 53 MG/DL (ref 5–25)
CA-I BLD-SCNC: 0.92 MMOL/L (ref 1.12–1.32)
CALCIUM SERPL-MCNC: 5.8 MG/DL (ref 8.4–10.2)
CALCIUM SERPL-MCNC: 7.3 MG/DL (ref 8.4–10.2)
CHLORIDE SERPL-SCNC: 105 MMOL/L (ref 96–108)
CHLORIDE SERPL-SCNC: 108 MMOL/L (ref 96–108)
CO2 SERPL-SCNC: 17 MMOL/L (ref 21–32)
CO2 SERPL-SCNC: 17 MMOL/L (ref 21–32)
CREAT SERPL-MCNC: 3.04 MG/DL (ref 0.6–1.3)
CREAT SERPL-MCNC: 3.07 MG/DL (ref 0.6–1.3)
CROSSMATCH: NORMAL
EOSINOPHIL # BLD AUTO: 0.01 THOUSAND/ÂΜL (ref 0–0.61)
EOSINOPHIL NFR BLD AUTO: 0 % (ref 0–6)
ERYTHROCYTE [DISTWIDTH] IN BLOOD BY AUTOMATED COUNT: 15.4 % (ref 11.6–15.1)
GFR SERPL CREATININE-BSD FRML MDRD: 15 ML/MIN/1.73SQ M
GFR SERPL CREATININE-BSD FRML MDRD: 15 ML/MIN/1.73SQ M
GLUCOSE SERPL-MCNC: 152 MG/DL (ref 65–140)
GLUCOSE SERPL-MCNC: 241 MG/DL (ref 65–140)
HCT VFR BLD AUTO: 28.3 % (ref 34.8–46.1)
HGB BLD-MCNC: 8.2 G/DL (ref 11.5–15.4)
HGB BLD-MCNC: 9.2 G/DL (ref 11.5–15.4)
IMM GRANULOCYTES # BLD AUTO: 0.1 THOUSAND/UL (ref 0–0.2)
IMM GRANULOCYTES NFR BLD AUTO: 1 % (ref 0–2)
LYMPHOCYTES # BLD AUTO: 0.63 THOUSANDS/ÂΜL (ref 0.6–4.47)
LYMPHOCYTES NFR BLD AUTO: 4 % (ref 14–44)
MAGNESIUM SERPL-MCNC: 1.4 MG/DL (ref 1.9–2.7)
MCH RBC QN AUTO: 29 PG (ref 26.8–34.3)
MCHC RBC AUTO-ENTMCNC: 32.5 G/DL (ref 31.4–37.4)
MCV RBC AUTO: 89 FL (ref 82–98)
MONOCYTES # BLD AUTO: 1.47 THOUSAND/ÂΜL (ref 0.17–1.22)
MONOCYTES NFR BLD AUTO: 9 % (ref 4–12)
NEUTROPHILS # BLD AUTO: 14.2 THOUSANDS/ÂΜL (ref 1.85–7.62)
NEUTS SEG NFR BLD AUTO: 86 % (ref 43–75)
NRBC BLD AUTO-RTO: 0 /100 WBCS
PLATELET # BLD AUTO: 208 THOUSANDS/UL (ref 149–390)
PMV BLD AUTO: 9.9 FL (ref 8.9–12.7)
POTASSIUM SERPL-SCNC: 3.1 MMOL/L (ref 3.5–5.3)
POTASSIUM SERPL-SCNC: 3.4 MMOL/L (ref 3.5–5.3)
RBC # BLD AUTO: 3.17 MILLION/UL (ref 3.81–5.12)
SODIUM SERPL-SCNC: 135 MMOL/L (ref 135–147)
SODIUM SERPL-SCNC: 138 MMOL/L (ref 135–147)
UNIT DISPENSE STATUS: NORMAL
UNIT PRODUCT CODE: NORMAL
UNIT PRODUCT VOLUME: 350 ML
UNIT RH: NORMAL
WBC # BLD AUTO: 16.46 THOUSAND/UL (ref 4.31–10.16)

## 2025-07-27 PROCEDURE — NC001 PR NO CHARGE: Performed by: RADIOLOGY

## 2025-07-27 PROCEDURE — 99232 SBSQ HOSP IP/OBS MODERATE 35: CPT

## 2025-07-27 PROCEDURE — 85018 HEMOGLOBIN: CPT

## 2025-07-27 PROCEDURE — 83735 ASSAY OF MAGNESIUM: CPT

## 2025-07-27 PROCEDURE — 80048 BASIC METABOLIC PNL TOTAL CA: CPT

## 2025-07-27 PROCEDURE — 85025 COMPLETE CBC W/AUTO DIFF WBC: CPT

## 2025-07-27 PROCEDURE — 99223 1ST HOSP IP/OBS HIGH 75: CPT | Performed by: INTERNAL MEDICINE

## 2025-07-27 PROCEDURE — 82330 ASSAY OF CALCIUM: CPT

## 2025-07-27 RX ORDER — POTASSIUM CHLORIDE 1500 MG/1
40 TABLET, EXTENDED RELEASE ORAL ONCE
Status: COMPLETED | OUTPATIENT
Start: 2025-07-27 | End: 2025-07-27

## 2025-07-27 RX ORDER — CALCIUM GLUCONATE 20 MG/ML
2 INJECTION, SOLUTION INTRAVENOUS ONCE
Status: COMPLETED | OUTPATIENT
Start: 2025-07-27 | End: 2025-07-27

## 2025-07-27 RX ORDER — MAGNESIUM SULFATE HEPTAHYDRATE 40 MG/ML
2 INJECTION, SOLUTION INTRAVENOUS ONCE
Status: COMPLETED | OUTPATIENT
Start: 2025-07-27 | End: 2025-07-27

## 2025-07-27 RX ORDER — CALCIUM CARBONATE 500 MG/1
1000 TABLET, CHEWABLE ORAL 3 TIMES DAILY
Status: DISCONTINUED | OUTPATIENT
Start: 2025-07-27 | End: 2025-07-30

## 2025-07-27 RX ORDER — POTASSIUM CHLORIDE 1500 MG/1
20 TABLET, EXTENDED RELEASE ORAL ONCE
Status: COMPLETED | OUTPATIENT
Start: 2025-07-27 | End: 2025-07-27

## 2025-07-27 RX ADMIN — ARIPIPRAZOLE 20 MG: 5 TABLET ORAL at 09:11

## 2025-07-27 RX ADMIN — CALCIUM ACETATE 1334 MG: 667 CAPSULE ORAL at 09:11

## 2025-07-27 RX ADMIN — CALCIUM GLUCONATE 2 G: 20 INJECTION, SOLUTION INTRAVENOUS at 07:28

## 2025-07-27 RX ADMIN — HEPARIN SODIUM 5000 UNITS: 5000 INJECTION INTRAVENOUS; SUBCUTANEOUS at 05:37

## 2025-07-27 RX ADMIN — CALCIUM ACETATE 1334 MG: 667 CAPSULE ORAL at 18:43

## 2025-07-27 RX ADMIN — CALCITRIOL 0.25 MCG: 0.25 CAPSULE, LIQUID FILLED ORAL at 09:11

## 2025-07-27 RX ADMIN — POTASSIUM CHLORIDE 20 MEQ: 1500 TABLET, EXTENDED RELEASE ORAL at 20:32

## 2025-07-27 RX ADMIN — VENLAFAXINE HYDROCHLORIDE 75 MG: 75 CAPSULE, EXTENDED RELEASE ORAL at 09:11

## 2025-07-27 RX ADMIN — HEPARIN SODIUM 5000 UNITS: 5000 INJECTION INTRAVENOUS; SUBCUTANEOUS at 16:33

## 2025-07-27 RX ADMIN — CEFEPIME 1000 MG: 1 INJECTION, POWDER, FOR SOLUTION INTRAMUSCULAR; INTRAVENOUS at 05:58

## 2025-07-27 RX ADMIN — PANTOPRAZOLE SODIUM 20 MG: 20 TABLET, DELAYED RELEASE ORAL at 05:37

## 2025-07-27 RX ADMIN — SODIUM BICARBONATE 100 ML/HR: 84 INJECTION, SOLUTION INTRAVENOUS at 18:49

## 2025-07-27 RX ADMIN — CALCIUM CARBONATE (ANTACID) CHEW TAB 500 MG 1000 MG: 500 CHEW TAB at 11:10

## 2025-07-27 RX ADMIN — CALCIUM GLUCONATE 2 G: 20 INJECTION, SOLUTION INTRAVENOUS at 11:10

## 2025-07-27 RX ADMIN — CALCIUM CARBONATE (ANTACID) CHEW TAB 500 MG 1000 MG: 500 CHEW TAB at 16:34

## 2025-07-27 RX ADMIN — SODIUM BICARBONATE 100 ML/HR: 84 INJECTION, SOLUTION INTRAVENOUS at 07:28

## 2025-07-27 RX ADMIN — FOLIC ACID 1 MG: 1 TABLET ORAL at 09:11

## 2025-07-27 RX ADMIN — CEFEPIME 1000 MG: 1 INJECTION, POWDER, FOR SOLUTION INTRAMUSCULAR; INTRAVENOUS at 18:43

## 2025-07-27 RX ADMIN — HEPARIN SODIUM 5000 UNITS: 5000 INJECTION INTRAVENOUS; SUBCUTANEOUS at 20:33

## 2025-07-27 RX ADMIN — CALCIUM CARBONATE (ANTACID) CHEW TAB 500 MG 1000 MG: 500 CHEW TAB at 20:32

## 2025-07-27 RX ADMIN — POTASSIUM CHLORIDE 40 MEQ: 1500 TABLET, EXTENDED RELEASE ORAL at 16:33

## 2025-07-27 RX ADMIN — POTASSIUM CHLORIDE 40 MEQ: 1500 TABLET, EXTENDED RELEASE ORAL at 09:31

## 2025-07-27 RX ADMIN — MAGNESIUM SULFATE HEPTAHYDRATE 2 G: 40 INJECTION, SOLUTION INTRAVENOUS at 09:11

## 2025-07-28 ENCOUNTER — APPOINTMENT (INPATIENT)
Dept: RADIOLOGY | Facility: HOSPITAL | Age: 70
DRG: 673 | End: 2025-07-28
Attending: STUDENT IN AN ORGANIZED HEALTH CARE EDUCATION/TRAINING PROGRAM
Payer: COMMERCIAL

## 2025-07-28 ENCOUNTER — ANESTHESIA (INPATIENT)
Dept: RADIOLOGY | Facility: HOSPITAL | Age: 70
DRG: 673 | End: 2025-07-28
Payer: COMMERCIAL

## 2025-07-28 ENCOUNTER — ANESTHESIA EVENT (INPATIENT)
Dept: RADIOLOGY | Facility: HOSPITAL | Age: 70
DRG: 673 | End: 2025-07-28
Payer: COMMERCIAL

## 2025-07-28 ENCOUNTER — APPOINTMENT (INPATIENT)
Dept: RADIOLOGY | Facility: HOSPITAL | Age: 70
DRG: 673 | End: 2025-07-28
Attending: RADIOLOGY
Payer: COMMERCIAL

## 2025-07-28 PROBLEM — E87.20 METABOLIC ACIDOSIS: Status: RESOLVED | Noted: 2025-07-27 | Resolved: 2025-07-28

## 2025-07-28 LAB
25(OH)D3 SERPL-MCNC: 48.6 NG/ML (ref 30–100)
ABO GROUP BLD BPU: NORMAL
ABO GROUP BLD BPU: NORMAL
ANION GAP SERPL CALCULATED.3IONS-SCNC: 9 MMOL/L (ref 4–13)
BASOPHILS # BLD AUTO: 0.04 THOUSANDS/ÂΜL (ref 0–0.1)
BASOPHILS NFR BLD AUTO: 0 % (ref 0–1)
BPU ID: NORMAL
BPU ID: NORMAL
BUN SERPL-MCNC: 50 MG/DL (ref 5–25)
CALCIUM SERPL-MCNC: 7.2 MG/DL (ref 8.4–10.2)
CHLORIDE SERPL-SCNC: 103 MMOL/L (ref 96–108)
CO2 SERPL-SCNC: 27 MMOL/L (ref 21–32)
CREAT SERPL-MCNC: 3.05 MG/DL (ref 0.6–1.3)
CROSSMATCH: NORMAL
CROSSMATCH: NORMAL
EOSINOPHIL # BLD AUTO: 0.03 THOUSAND/ÂΜL (ref 0–0.61)
EOSINOPHIL NFR BLD AUTO: 0 % (ref 0–6)
ERYTHROCYTE [DISTWIDTH] IN BLOOD BY AUTOMATED COUNT: 15.6 % (ref 11.6–15.1)
GFR SERPL CREATININE-BSD FRML MDRD: 15 ML/MIN/1.73SQ M
GLUCOSE SERPL-MCNC: 116 MG/DL (ref 65–140)
HCT VFR BLD AUTO: 25.6 % (ref 34.8–46.1)
HGB BLD-MCNC: 7.2 G/DL (ref 11.5–15.4)
HGB BLD-MCNC: 8 G/DL (ref 11.5–15.4)
HGB BLD-MCNC: 8.4 G/DL (ref 11.5–15.4)
IMM GRANULOCYTES # BLD AUTO: 0.08 THOUSAND/UL (ref 0–0.2)
IMM GRANULOCYTES NFR BLD AUTO: 1 % (ref 0–2)
LYMPHOCYTES # BLD AUTO: 0.93 THOUSANDS/ÂΜL (ref 0.6–4.47)
LYMPHOCYTES NFR BLD AUTO: 10 % (ref 14–44)
MAGNESIUM SERPL-MCNC: 1.9 MG/DL (ref 1.9–2.7)
MCH RBC QN AUTO: 29.2 PG (ref 26.8–34.3)
MCHC RBC AUTO-ENTMCNC: 32.8 G/DL (ref 31.4–37.4)
MCV RBC AUTO: 89 FL (ref 82–98)
MONOCYTES # BLD AUTO: 1.02 THOUSAND/ÂΜL (ref 0.17–1.22)
MONOCYTES NFR BLD AUTO: 11 % (ref 4–12)
NEUTROPHILS # BLD AUTO: 6.98 THOUSANDS/ÂΜL (ref 1.85–7.62)
NEUTS SEG NFR BLD AUTO: 78 % (ref 43–75)
NRBC BLD AUTO-RTO: 0 /100 WBCS
PLATELET # BLD AUTO: 186 THOUSANDS/UL (ref 149–390)
PMV BLD AUTO: 10.7 FL (ref 8.9–12.7)
POTASSIUM SERPL-SCNC: 3.9 MMOL/L (ref 3.5–5.3)
RBC # BLD AUTO: 2.88 MILLION/UL (ref 3.81–5.12)
SODIUM SERPL-SCNC: 139 MMOL/L (ref 135–147)
UNIT DISPENSE STATUS: NORMAL
UNIT DISPENSE STATUS: NORMAL
UNIT PRODUCT CODE: NORMAL
UNIT PRODUCT CODE: NORMAL
UNIT PRODUCT VOLUME: 350 ML
UNIT PRODUCT VOLUME: 350 ML
UNIT RH: NORMAL
UNIT RH: NORMAL
WBC # BLD AUTO: 9.08 THOUSAND/UL (ref 4.31–10.16)

## 2025-07-28 PROCEDURE — 83735 ASSAY OF MAGNESIUM: CPT | Performed by: INTERNAL MEDICINE

## 2025-07-28 PROCEDURE — C1729 CATH, DRAINAGE: HCPCS

## 2025-07-28 PROCEDURE — C1894 INTRO/SHEATH, NON-LASER: HCPCS

## 2025-07-28 PROCEDURE — C1874 STENT, COATED/COV W/DEL SYS: HCPCS

## 2025-07-28 PROCEDURE — NC001 PR NO CHARGE: Performed by: SURGERY

## 2025-07-28 PROCEDURE — 82306 VITAMIN D 25 HYDROXY: CPT | Performed by: INTERNAL MEDICINE

## 2025-07-28 PROCEDURE — B41D1ZZ FLUOROSCOPY OF AORTA AND BILATERAL LOWER EXTREMITY ARTERIES USING LOW OSMOLAR CONTRAST: ICD-10-PCS | Performed by: STUDENT IN AN ORGANIZED HEALTH CARE EDUCATION/TRAINING PROGRAM

## 2025-07-28 PROCEDURE — 85018 HEMOGLOBIN: CPT

## 2025-07-28 PROCEDURE — 50688 CHANGE OF URETER TUBE/STENT: CPT | Performed by: STUDENT IN AN ORGANIZED HEALTH CARE EDUCATION/TRAINING PROGRAM

## 2025-07-28 PROCEDURE — 75625 CONTRAST EXAM ABDOMINL AORTA: CPT

## 2025-07-28 PROCEDURE — C1769 GUIDE WIRE: HCPCS

## 2025-07-28 PROCEDURE — 99232 SBSQ HOSP IP/OBS MODERATE 35: CPT | Performed by: STUDENT IN AN ORGANIZED HEALTH CARE EDUCATION/TRAINING PROGRAM

## 2025-07-28 PROCEDURE — C1887 CATHETER, GUIDING: HCPCS

## 2025-07-28 PROCEDURE — 99223 1ST HOSP IP/OBS HIGH 75: CPT | Performed by: SURGERY

## 2025-07-28 PROCEDURE — 75984 XRAY CONTROL CATHETER CHANGE: CPT

## 2025-07-28 PROCEDURE — 99232 SBSQ HOSP IP/OBS MODERATE 35: CPT

## 2025-07-28 PROCEDURE — 99222 1ST HOSP IP/OBS MODERATE 55: CPT | Performed by: PHYSICIAN ASSISTANT

## 2025-07-28 PROCEDURE — 50688 CHANGE OF URETER TUBE/STENT: CPT

## 2025-07-28 PROCEDURE — 99223 1ST HOSP IP/OBS HIGH 75: CPT | Performed by: UROLOGY

## 2025-07-28 PROCEDURE — 0T25X0Z CHANGE DRAINAGE DEVICE IN KIDNEY, EXTERNAL APPROACH: ICD-10-PCS | Performed by: STUDENT IN AN ORGANIZED HEALTH CARE EDUCATION/TRAINING PROGRAM

## 2025-07-28 PROCEDURE — 80048 BASIC METABOLIC PNL TOTAL CA: CPT

## 2025-07-28 PROCEDURE — 87040 BLOOD CULTURE FOR BACTERIA: CPT | Performed by: PHYSICIAN ASSISTANT

## 2025-07-28 PROCEDURE — 85025 COMPLETE CBC W/AUTO DIFF WBC: CPT

## 2025-07-28 PROCEDURE — 37236 OPEN/PERQ PLACE STENT 1ST: CPT

## 2025-07-28 PROCEDURE — 37221 HB ILIAC REVASC W/STENT: CPT

## 2025-07-28 PROCEDURE — 51702 INSERT TEMP BLADDER CATH: CPT | Performed by: UROLOGY

## 2025-07-28 PROCEDURE — C1760 CLOSURE DEV, VASC: HCPCS

## 2025-07-28 PROCEDURE — 99232 SBSQ HOSP IP/OBS MODERATE 35: CPT | Performed by: INTERNAL MEDICINE

## 2025-07-28 PROCEDURE — 047D3DZ DILATION OF LEFT COMMON ILIAC ARTERY WITH INTRALUMINAL DEVICE, PERCUTANEOUS APPROACH: ICD-10-PCS | Performed by: STUDENT IN AN ORGANIZED HEALTH CARE EDUCATION/TRAINING PROGRAM

## 2025-07-28 PROCEDURE — 75984 XRAY CONTROL CATHETER CHANGE: CPT | Performed by: STUDENT IN AN ORGANIZED HEALTH CARE EDUCATION/TRAINING PROGRAM

## 2025-07-28 RX ORDER — FENTANYL CITRATE 50 UG/ML
INJECTION, SOLUTION INTRAMUSCULAR; INTRAVENOUS AS NEEDED
Status: DISCONTINUED | OUTPATIENT
Start: 2025-07-28 | End: 2025-07-28

## 2025-07-28 RX ORDER — LIDOCAINE HYDROCHLORIDE 10 MG/ML
INJECTION, SOLUTION EPIDURAL; INFILTRATION; INTRACAUDAL; PERINEURAL AS NEEDED
Status: DISCONTINUED | OUTPATIENT
Start: 2025-07-28 | End: 2025-07-28

## 2025-07-28 RX ORDER — ONDANSETRON 2 MG/ML
4 INJECTION INTRAMUSCULAR; INTRAVENOUS ONCE AS NEEDED
Status: DISCONTINUED | OUTPATIENT
Start: 2025-07-28 | End: 2025-07-28 | Stop reason: HOSPADM

## 2025-07-28 RX ORDER — LIDOCAINE WITH 8.4% SOD BICARB 0.9%(10ML)
SYRINGE (ML) INJECTION AS NEEDED
Status: COMPLETED | OUTPATIENT
Start: 2025-07-28 | End: 2025-07-28

## 2025-07-28 RX ORDER — FENTANYL CITRATE/PF 50 MCG/ML
50 SYRINGE (ML) INJECTION
Status: DISCONTINUED | OUTPATIENT
Start: 2025-07-28 | End: 2025-07-28 | Stop reason: HOSPADM

## 2025-07-28 RX ORDER — VANCOMYCIN HYDROCHLORIDE 1 G/200ML
15 INJECTION, SOLUTION INTRAVENOUS ONCE
Status: DISCONTINUED | OUTPATIENT
Start: 2025-07-28 | End: 2025-07-28

## 2025-07-28 RX ORDER — ALBUMIN HUMAN 50 G/1000ML
SOLUTION INTRAVENOUS CONTINUOUS PRN
Status: DISCONTINUED | OUTPATIENT
Start: 2025-07-28 | End: 2025-07-28

## 2025-07-28 RX ORDER — SODIUM CHLORIDE, SODIUM LACTATE, POTASSIUM CHLORIDE, CALCIUM CHLORIDE 600; 310; 30; 20 MG/100ML; MG/100ML; MG/100ML; MG/100ML
100 INJECTION, SOLUTION INTRAVENOUS CONTINUOUS
Status: DISCONTINUED | OUTPATIENT
Start: 2025-07-28 | End: 2025-07-28

## 2025-07-28 RX ORDER — SODIUM CHLORIDE, SODIUM GLUCONATE, SODIUM ACETATE, POTASSIUM CHLORIDE, MAGNESIUM CHLORIDE, SODIUM PHOSPHATE, DIBASIC, AND POTASSIUM PHOSPHATE .53; .5; .37; .037; .03; .012; .00082 G/100ML; G/100ML; G/100ML; G/100ML; G/100ML; G/100ML; G/100ML
75 INJECTION, SOLUTION INTRAVENOUS CONTINUOUS
Status: DISCONTINUED | OUTPATIENT
Start: 2025-07-28 | End: 2025-07-28

## 2025-07-28 RX ORDER — CLINDAMYCIN PHOSPHATE 600 MG/50ML
600 INJECTION, SOLUTION INTRAVENOUS EVERY 8 HOURS
Status: DISCONTINUED | OUTPATIENT
Start: 2025-07-28 | End: 2025-07-28

## 2025-07-28 RX ORDER — SODIUM CHLORIDE, SODIUM GLUCONATE, SODIUM ACETATE, POTASSIUM CHLORIDE, MAGNESIUM CHLORIDE, SODIUM PHOSPHATE, DIBASIC, AND POTASSIUM PHOSPHATE .53; .5; .37; .037; .03; .012; .00082 G/100ML; G/100ML; G/100ML; G/100ML; G/100ML; G/100ML; G/100ML
100 INJECTION, SOLUTION INTRAVENOUS CONTINUOUS
Status: DISCONTINUED | OUTPATIENT
Start: 2025-07-28 | End: 2025-07-29

## 2025-07-28 RX ORDER — ONDANSETRON 2 MG/ML
INJECTION INTRAMUSCULAR; INTRAVENOUS AS NEEDED
Status: COMPLETED | OUTPATIENT
Start: 2025-07-28 | End: 2025-07-28

## 2025-07-28 RX ORDER — PROPOFOL 10 MG/ML
INJECTION, EMULSION INTRAVENOUS AS NEEDED
Status: DISCONTINUED | OUTPATIENT
Start: 2025-07-28 | End: 2025-07-28

## 2025-07-28 RX ORDER — SODIUM CHLORIDE, SODIUM LACTATE, POTASSIUM CHLORIDE, CALCIUM CHLORIDE 600; 310; 30; 20 MG/100ML; MG/100ML; MG/100ML; MG/100ML
INJECTION, SOLUTION INTRAVENOUS CONTINUOUS PRN
Status: DISCONTINUED | OUTPATIENT
Start: 2025-07-28 | End: 2025-07-28

## 2025-07-28 RX ORDER — SUCCINYLCHOLINE/SOD CL,ISO/PF 100 MG/5ML
SYRINGE (ML) INTRAVENOUS AS NEEDED
Status: DISCONTINUED | OUTPATIENT
Start: 2025-07-28 | End: 2025-07-28

## 2025-07-28 RX ORDER — CALCIUM GLUCONATE 20 MG/ML
1 INJECTION, SOLUTION INTRAVENOUS ONCE
Status: COMPLETED | OUTPATIENT
Start: 2025-07-28 | End: 2025-07-28

## 2025-07-28 RX ORDER — ROCURONIUM BROMIDE 10 MG/ML
INJECTION, SOLUTION INTRAVENOUS AS NEEDED
Status: DISCONTINUED | OUTPATIENT
Start: 2025-07-28 | End: 2025-07-28

## 2025-07-28 RX ORDER — EPHEDRINE SULFATE 50 MG/ML
INJECTION INTRAVENOUS AS NEEDED
Status: DISCONTINUED | OUTPATIENT
Start: 2025-07-28 | End: 2025-07-28

## 2025-07-28 RX ORDER — DEXAMETHASONE SODIUM PHOSPHATE 10 MG/ML
INJECTION, SOLUTION INTRAMUSCULAR; INTRAVENOUS AS NEEDED
Status: DISCONTINUED | OUTPATIENT
Start: 2025-07-28 | End: 2025-07-28

## 2025-07-28 RX ORDER — ONDANSETRON 2 MG/ML
INJECTION INTRAMUSCULAR; INTRAVENOUS AS NEEDED
Status: DISCONTINUED | OUTPATIENT
Start: 2025-07-28 | End: 2025-07-28

## 2025-07-28 RX ADMIN — FENTANYL CITRATE 25 MCG: 50 INJECTION INTRAMUSCULAR; INTRAVENOUS at 09:35

## 2025-07-28 RX ADMIN — FENTANYL CITRATE 25 MCG: 50 INJECTION INTRAMUSCULAR; INTRAVENOUS at 09:38

## 2025-07-28 RX ADMIN — CALCIUM ACETATE 1334 MG: 667 CAPSULE ORAL at 10:59

## 2025-07-28 RX ADMIN — HEPARIN SODIUM 5000 UNITS: 5000 INJECTION INTRAVENOUS; SUBCUTANEOUS at 21:04

## 2025-07-28 RX ADMIN — PROPOFOL 120 MG: 10 INJECTION, EMULSION INTRAVENOUS at 13:55

## 2025-07-28 RX ADMIN — FENTANYL CITRATE 25 MCG: 50 INJECTION INTRAMUSCULAR; INTRAVENOUS at 09:08

## 2025-07-28 RX ADMIN — HEPARIN SODIUM 5000 UNITS: 5000 INJECTION INTRAVENOUS; SUBCUTANEOUS at 16:49

## 2025-07-28 RX ADMIN — IOHEXOL 50 ML: 350 INJECTION, SOLUTION INTRAVENOUS at 09:41

## 2025-07-28 RX ADMIN — PHENYLEPHRINE HYDROCHLORIDE 30 MCG/MIN: 50 INJECTION INTRAVENOUS at 14:02

## 2025-07-28 RX ADMIN — CALCIUM GLUCONATE 1 G: 20 INJECTION, SOLUTION INTRAVENOUS at 07:38

## 2025-07-28 RX ADMIN — CALCIUM CARBONATE (ANTACID) CHEW TAB 500 MG 1000 MG: 500 CHEW TAB at 20:17

## 2025-07-28 RX ADMIN — CALCIUM CARBONATE (ANTACID) CHEW TAB 500 MG 1000 MG: 500 CHEW TAB at 16:48

## 2025-07-28 RX ADMIN — CALCITRIOL 0.25 MCG: 0.25 CAPSULE, LIQUID FILLED ORAL at 10:59

## 2025-07-28 RX ADMIN — SODIUM CHLORIDE, SODIUM GLUCONATE, SODIUM ACETATE, POTASSIUM CHLORIDE, MAGNESIUM CHLORIDE, SODIUM PHOSPHATE, DIBASIC, AND POTASSIUM PHOSPHATE 100 ML/HR: .53; .5; .37; .037; .03; .012; .00082 INJECTION, SOLUTION INTRAVENOUS at 23:19

## 2025-07-28 RX ADMIN — EPHEDRINE SULFATE 10 MG: 50 INJECTION INTRAVENOUS at 14:30

## 2025-07-28 RX ADMIN — CEFEPIME 1000 MG: 1 INJECTION, POWDER, FOR SOLUTION INTRAMUSCULAR; INTRAVENOUS at 18:19

## 2025-07-28 RX ADMIN — HEPARIN SODIUM 5000 UNITS: 5000 INJECTION INTRAVENOUS; SUBCUTANEOUS at 04:55

## 2025-07-28 RX ADMIN — DEXAMETHASONE SODIUM PHOSPHATE 10 MG: 10 INJECTION INTRAMUSCULAR; INTRAVENOUS at 14:15

## 2025-07-28 RX ADMIN — FENTANYL CITRATE 25 MCG: 50 INJECTION INTRAMUSCULAR; INTRAVENOUS at 09:24

## 2025-07-28 RX ADMIN — ROCURONIUM 20 MG: 50 INJECTION, SOLUTION INTRAVENOUS at 14:24

## 2025-07-28 RX ADMIN — FOLIC ACID 1 MG: 1 TABLET ORAL at 10:59

## 2025-07-28 RX ADMIN — Medication 10 ML: at 14:30

## 2025-07-28 RX ADMIN — CALCIUM CARBONATE (ANTACID) CHEW TAB 500 MG 1000 MG: 500 CHEW TAB at 10:59

## 2025-07-28 RX ADMIN — IOHEXOL 90 ML: 350 INJECTION, SOLUTION INTRAVENOUS at 16:05

## 2025-07-28 RX ADMIN — ONDANSETRON 4 MG: 2 INJECTION INTRAMUSCULAR; INTRAVENOUS at 14:15

## 2025-07-28 RX ADMIN — PANTOPRAZOLE SODIUM 20 MG: 20 TABLET, DELAYED RELEASE ORAL at 04:55

## 2025-07-28 RX ADMIN — VENLAFAXINE HYDROCHLORIDE 75 MG: 75 CAPSULE, EXTENDED RELEASE ORAL at 11:00

## 2025-07-28 RX ADMIN — ONDANSETRON 4 MG: 2 INJECTION INTRAMUSCULAR; INTRAVENOUS at 10:03

## 2025-07-28 RX ADMIN — Medication 100 MG: at 13:55

## 2025-07-28 RX ADMIN — CEFEPIME 1000 MG: 1 INJECTION, POWDER, FOR SOLUTION INTRAMUSCULAR; INTRAVENOUS at 06:13

## 2025-07-28 RX ADMIN — FENTANYL CITRATE 50 MCG: 50 INJECTION INTRAMUSCULAR; INTRAVENOUS at 13:55

## 2025-07-28 RX ADMIN — ALBUMIN (HUMAN): 12.5 INJECTION, SOLUTION INTRAVENOUS at 14:32

## 2025-07-28 RX ADMIN — ARIPIPRAZOLE 20 MG: 5 TABLET ORAL at 10:59

## 2025-07-28 RX ADMIN — LIDOCAINE HYDROCHLORIDE 50 MG: 10 INJECTION, SOLUTION EPIDURAL; INFILTRATION; INTRACAUDAL at 13:55

## 2025-07-28 RX ADMIN — SODIUM CHLORIDE, SODIUM GLUCONATE, SODIUM ACETATE, POTASSIUM CHLORIDE, MAGNESIUM CHLORIDE, SODIUM PHOSPHATE, DIBASIC, AND POTASSIUM PHOSPHATE 100 ML/HR: .53; .5; .37; .037; .03; .012; .00082 INJECTION, SOLUTION INTRAVENOUS at 18:21

## 2025-07-28 RX ADMIN — CALCIUM ACETATE 1334 MG: 667 CAPSULE ORAL at 18:20

## 2025-07-28 RX ADMIN — SODIUM CHLORIDE, SODIUM GLUCONATE, SODIUM ACETATE, POTASSIUM CHLORIDE, MAGNESIUM CHLORIDE, SODIUM PHOSPHATE, DIBASIC, AND POTASSIUM PHOSPHATE 75 ML/HR: .53; .5; .37; .037; .03; .012; .00082 INJECTION, SOLUTION INTRAVENOUS at 06:13

## 2025-07-28 RX ADMIN — SODIUM CHLORIDE, SODIUM LACTATE, POTASSIUM CHLORIDE, AND CALCIUM CHLORIDE: .6; .31; .03; .02 INJECTION, SOLUTION INTRAVENOUS at 13:50

## 2025-07-28 RX ADMIN — SUGAMMADEX 200 MG: 100 INJECTION, SOLUTION INTRAVENOUS at 15:33

## 2025-07-28 RX ADMIN — ROCURONIUM 30 MG: 50 INJECTION, SOLUTION INTRAVENOUS at 14:13

## 2025-07-28 RX ADMIN — SODIUM BICARBONATE 100 ML/HR: 84 INJECTION, SOLUTION INTRAVENOUS at 04:40

## 2025-07-29 PROBLEM — E87.6 HYPOKALEMIA: Status: RESOLVED | Noted: 2025-07-27 | Resolved: 2025-07-29

## 2025-07-29 PROBLEM — E83.42 HYPOMAGNESEMIA: Status: RESOLVED | Noted: 2025-07-27 | Resolved: 2025-07-29

## 2025-07-29 LAB
ANION GAP SERPL CALCULATED.3IONS-SCNC: 12 MMOL/L (ref 4–13)
ANION GAP SERPL CALCULATED.3IONS-SCNC: 13 MMOL/L (ref 4–13)
ANION GAP SERPL CALCULATED.3IONS-SCNC: 22 MMOL/L (ref 4–13)
ARTERIAL PATENCY WRIST A: YES
B-OH-BUTYR SERPL-MCNC: <0.05 MMOL/L (ref 0.2–0.6)
BASE EX.OXY STD BLDV CALC-SCNC: 61 % (ref 60–80)
BASE EX.OXY STD BLDV CALC-SCNC: 91.3 % (ref 60–80)
BASE EXCESS BLDV CALC-SCNC: -2.6 MMOL/L
BASE EXCESS BLDV CALC-SCNC: -3 MMOL/L
BUN SERPL-MCNC: 34 MG/DL (ref 5–25)
BUN SERPL-MCNC: 43 MG/DL (ref 5–25)
BUN SERPL-MCNC: 46 MG/DL (ref 5–25)
CA-I BLD-SCNC: 0.88 MMOL/L (ref 1.12–1.32)
CA-I BLD-SCNC: 1.05 MMOL/L (ref 1.12–1.32)
CALCIUM SERPL-MCNC: 5.8 MG/DL (ref 8.4–10.2)
CALCIUM SERPL-MCNC: 7.4 MG/DL (ref 8.4–10.2)
CALCIUM SERPL-MCNC: 8.4 MG/DL (ref 8.4–10.2)
CHLORIDE SERPL-SCNC: 101 MMOL/L (ref 96–108)
CHLORIDE SERPL-SCNC: 105 MMOL/L (ref 96–108)
CHLORIDE SERPL-SCNC: 98 MMOL/L (ref 96–108)
CO2 SERPL-SCNC: 16 MMOL/L (ref 21–32)
CO2 SERPL-SCNC: 21 MMOL/L (ref 21–32)
CO2 SERPL-SCNC: 21 MMOL/L (ref 21–32)
CREAT SERPL-MCNC: 2.28 MG/DL (ref 0.6–1.3)
CREAT SERPL-MCNC: 3 MG/DL (ref 0.6–1.3)
CREAT SERPL-MCNC: 3.17 MG/DL (ref 0.6–1.3)
ERYTHROCYTE [DISTWIDTH] IN BLOOD BY AUTOMATED COUNT: 15.9 % (ref 11.6–15.1)
GFR SERPL CREATININE-BSD FRML MDRD: 14 ML/MIN/1.73SQ M
GFR SERPL CREATININE-BSD FRML MDRD: 15 ML/MIN/1.73SQ M
GFR SERPL CREATININE-BSD FRML MDRD: 21 ML/MIN/1.73SQ M
GLUCOSE SERPL-MCNC: 102 MG/DL (ref 65–140)
GLUCOSE SERPL-MCNC: 112 MG/DL (ref 65–140)
GLUCOSE SERPL-MCNC: 125 MG/DL (ref 65–140)
GLUCOSE SERPL-MCNC: 128 MG/DL (ref 65–140)
GLUCOSE SERPL-MCNC: 130 MG/DL (ref 65–140)
GLUCOSE SERPL-MCNC: 68 MG/DL (ref 65–140)
HCO3 BLDV-SCNC: 22.3 MMOL/L (ref 24–30)
HCO3 BLDV-SCNC: 22.4 MMOL/L (ref 24–30)
HCT VFR BLD AUTO: 23 % (ref 34.8–46.1)
HGB BLD-MCNC: 6.9 G/DL (ref 11.5–15.4)
HGB BLD-MCNC: 7.2 G/DL (ref 11.5–15.4)
HGB BLD-MCNC: 7.6 G/DL (ref 11.5–15.4)
LACTATE SERPL-SCNC: 1.7 MMOL/L (ref 0.5–2)
MAGNESIUM SERPL-MCNC: 1.7 MG/DL (ref 1.9–2.7)
MAGNESIUM SERPL-MCNC: 2.3 MG/DL (ref 1.9–2.7)
MCH RBC QN AUTO: 29 PG (ref 26.8–34.3)
MCHC RBC AUTO-ENTMCNC: 31.3 G/DL (ref 31.4–37.4)
MCV RBC AUTO: 93 FL (ref 82–98)
O2 CT BLDV-SCNC: 11.1 ML/DL
O2 CT BLDV-SCNC: 6.8 ML/DL
PCO2 BLDV: 39.3 MM HG (ref 42–50)
PCO2 BLDV: 41 MM HG (ref 42–50)
PH BLDV: 7.35 [PH] (ref 7.3–7.4)
PH BLDV: 7.37 [PH] (ref 7.3–7.4)
PHOSPHATE SERPL-MCNC: 3.8 MG/DL (ref 2.3–4.1)
PHOSPHATE SERPL-MCNC: 4.4 MG/DL (ref 2.3–4.1)
PHOSPHATE SERPL-MCNC: 4.5 MG/DL (ref 2.3–4.1)
PLATELET # BLD AUTO: 178 THOUSANDS/UL (ref 149–390)
PMV BLD AUTO: 10.4 FL (ref 8.9–12.7)
PO2 BLDV: 30.8 MM HG (ref 35–45)
PO2 BLDV: 84.4 MM HG (ref 35–45)
POTASSIUM SERPL-SCNC: 4.3 MMOL/L (ref 3.5–5.3)
POTASSIUM SERPL-SCNC: 4.4 MMOL/L (ref 3.5–5.3)
POTASSIUM SERPL-SCNC: 5 MMOL/L (ref 3.5–5.3)
RBC # BLD AUTO: 2.48 MILLION/UL (ref 3.81–5.12)
SODIUM SERPL-SCNC: 134 MMOL/L (ref 135–147)
SODIUM SERPL-SCNC: 136 MMOL/L (ref 135–147)
SODIUM SERPL-SCNC: 139 MMOL/L (ref 135–147)
WBC # BLD AUTO: 16.09 THOUSAND/UL (ref 4.31–10.16)

## 2025-07-29 PROCEDURE — 84100 ASSAY OF PHOSPHORUS: CPT

## 2025-07-29 PROCEDURE — G0545 PR INHERENT VISIT TO INPT: HCPCS | Performed by: STUDENT IN AN ORGANIZED HEALTH CARE EDUCATION/TRAINING PROGRAM

## 2025-07-29 PROCEDURE — 85018 HEMOGLOBIN: CPT

## 2025-07-29 PROCEDURE — 99223 1ST HOSP IP/OBS HIGH 75: CPT | Performed by: STUDENT IN AN ORGANIZED HEALTH CARE EDUCATION/TRAINING PROGRAM

## 2025-07-29 PROCEDURE — 99232 SBSQ HOSP IP/OBS MODERATE 35: CPT

## 2025-07-29 PROCEDURE — 83605 ASSAY OF LACTIC ACID: CPT

## 2025-07-29 PROCEDURE — 99232 SBSQ HOSP IP/OBS MODERATE 35: CPT | Performed by: INTERNAL MEDICINE

## 2025-07-29 PROCEDURE — 83735 ASSAY OF MAGNESIUM: CPT

## 2025-07-29 PROCEDURE — 80048 BASIC METABOLIC PNL TOTAL CA: CPT

## 2025-07-29 PROCEDURE — 82948 REAGENT STRIP/BLOOD GLUCOSE: CPT

## 2025-07-29 PROCEDURE — 82330 ASSAY OF CALCIUM: CPT

## 2025-07-29 PROCEDURE — 30233N1 TRANSFUSION OF NONAUTOLOGOUS RED BLOOD CELLS INTO PERIPHERAL VEIN, PERCUTANEOUS APPROACH: ICD-10-PCS

## 2025-07-29 PROCEDURE — 82805 BLOOD GASES W/O2 SATURATION: CPT

## 2025-07-29 PROCEDURE — P9016 RBC LEUKOCYTES REDUCED: HCPCS

## 2025-07-29 PROCEDURE — 82010 KETONE BODYS QUAN: CPT

## 2025-07-29 PROCEDURE — 99232 SBSQ HOSP IP/OBS MODERATE 35: CPT | Performed by: PHYSICIAN ASSISTANT

## 2025-07-29 PROCEDURE — NC001 PR NO CHARGE: Performed by: INTERNAL MEDICINE

## 2025-07-29 PROCEDURE — 85027 COMPLETE CBC AUTOMATED: CPT

## 2025-07-29 PROCEDURE — 82330 ASSAY OF CALCIUM: CPT | Performed by: NURSE PRACTITIONER

## 2025-07-29 RX ORDER — POLYETHYLENE GLYCOL 3350 17 G/17G
17 POWDER, FOR SOLUTION ORAL DAILY
Status: DISCONTINUED | OUTPATIENT
Start: 2025-07-29 | End: 2025-08-06 | Stop reason: HOSPADM

## 2025-07-29 RX ORDER — VANCOMYCIN HYDROCHLORIDE 1 G/200ML
12.5 INJECTION, SOLUTION INTRAVENOUS DAILY PRN
Status: DISCONTINUED | OUTPATIENT
Start: 2025-07-29 | End: 2025-07-30

## 2025-07-29 RX ORDER — VANCOMYCIN HYDROCHLORIDE 1 G/200ML
15 INJECTION, SOLUTION INTRAVENOUS EVERY 12 HOURS
Status: DISCONTINUED | OUTPATIENT
Start: 2025-07-29 | End: 2025-07-29

## 2025-07-29 RX ORDER — DEXTROSE MONOHYDRATE 25 G/50ML
25 INJECTION, SOLUTION INTRAVENOUS ONCE
Status: COMPLETED | OUTPATIENT
Start: 2025-07-29 | End: 2025-07-29

## 2025-07-29 RX ORDER — CALCIUM GLUCONATE 20 MG/ML
2 INJECTION, SOLUTION INTRAVENOUS ONCE
Status: COMPLETED | OUTPATIENT
Start: 2025-07-29 | End: 2025-07-29

## 2025-07-29 RX ORDER — MAGNESIUM SULFATE HEPTAHYDRATE 40 MG/ML
2 INJECTION, SOLUTION INTRAVENOUS ONCE
Status: COMPLETED | OUTPATIENT
Start: 2025-07-29 | End: 2025-07-29

## 2025-07-29 RX ORDER — CALCIUM GLUCONATE 20 MG/ML
1 INJECTION, SOLUTION INTRAVENOUS ONCE
Status: COMPLETED | OUTPATIENT
Start: 2025-07-29 | End: 2025-07-29

## 2025-07-29 RX ORDER — AMOXICILLIN 250 MG
1 CAPSULE ORAL
Status: DISCONTINUED | OUTPATIENT
Start: 2025-07-29 | End: 2025-07-31

## 2025-07-29 RX ADMIN — MAGNESIUM SULFATE HEPTAHYDRATE 2 G: 40 INJECTION, SOLUTION INTRAVENOUS at 21:10

## 2025-07-29 RX ADMIN — CEFEPIME 1000 MG: 1 INJECTION, POWDER, FOR SOLUTION INTRAMUSCULAR; INTRAVENOUS at 18:09

## 2025-07-29 RX ADMIN — CALCIUM GLUCONATE 1 G: 20 INJECTION, SOLUTION INTRAVENOUS at 16:37

## 2025-07-29 RX ADMIN — CALCIUM CARBONATE (ANTACID) CHEW TAB 500 MG 1000 MG: 500 CHEW TAB at 15:34

## 2025-07-29 RX ADMIN — Medication 3 MG: at 21:23

## 2025-07-29 RX ADMIN — HEPARIN SODIUM 5000 UNITS: 5000 INJECTION INTRAVENOUS; SUBCUTANEOUS at 13:47

## 2025-07-29 RX ADMIN — CALCIUM ACETATE 1334 MG: 667 CAPSULE ORAL at 09:15

## 2025-07-29 RX ADMIN — SODIUM CHLORIDE, SODIUM GLUCONATE, SODIUM ACETATE, POTASSIUM CHLORIDE, MAGNESIUM CHLORIDE, SODIUM PHOSPHATE, DIBASIC, AND POTASSIUM PHOSPHATE 100 ML/HR: .53; .5; .37; .037; .03; .012; .00082 INJECTION, SOLUTION INTRAVENOUS at 09:26

## 2025-07-29 RX ADMIN — CEFEPIME 1000 MG: 1 INJECTION, POWDER, FOR SOLUTION INTRAMUSCULAR; INTRAVENOUS at 06:06

## 2025-07-29 RX ADMIN — CALCIUM GLUCONATE 1 G: 20 INJECTION, SOLUTION INTRAVENOUS at 21:10

## 2025-07-29 RX ADMIN — CALCITRIOL 0.25 MCG: 0.25 CAPSULE, LIQUID FILLED ORAL at 09:15

## 2025-07-29 RX ADMIN — PANTOPRAZOLE SODIUM 20 MG: 20 TABLET, DELAYED RELEASE ORAL at 06:06

## 2025-07-29 RX ADMIN — ARIPIPRAZOLE 20 MG: 5 TABLET ORAL at 09:15

## 2025-07-29 RX ADMIN — VENLAFAXINE HYDROCHLORIDE 75 MG: 75 CAPSULE, EXTENDED RELEASE ORAL at 09:16

## 2025-07-29 RX ADMIN — HEPARIN SODIUM 5000 UNITS: 5000 INJECTION INTRAVENOUS; SUBCUTANEOUS at 21:02

## 2025-07-29 RX ADMIN — POLYETHYLENE GLYCOL 3350 17 G: 17 POWDER, FOR SOLUTION ORAL at 13:46

## 2025-07-29 RX ADMIN — HEPARIN SODIUM 5000 UNITS: 5000 INJECTION INTRAVENOUS; SUBCUTANEOUS at 06:06

## 2025-07-29 RX ADMIN — Medication 3 MG: at 01:41

## 2025-07-29 RX ADMIN — CALCIUM GLUCONATE 2 G: 20 INJECTION, SOLUTION INTRAVENOUS at 07:33

## 2025-07-29 RX ADMIN — CALCIUM CARBONATE (ANTACID) CHEW TAB 500 MG 1000 MG: 500 CHEW TAB at 21:02

## 2025-07-29 RX ADMIN — SODIUM BICARBONATE 100 ML/HR: 84 INJECTION, SOLUTION INTRAVENOUS at 15:36

## 2025-07-29 RX ADMIN — DEXTROSE MONOHYDRATE 25 ML: 25 INJECTION, SOLUTION INTRAVENOUS at 15:19

## 2025-07-29 RX ADMIN — VANCOMYCIN HYDROCHLORIDE 1750 MG: 5 INJECTION, POWDER, LYOPHILIZED, FOR SOLUTION INTRAVENOUS at 18:10

## 2025-07-29 RX ADMIN — FOLIC ACID 1 MG: 1 TABLET ORAL at 09:16

## 2025-07-29 RX ADMIN — CALCIUM CARBONATE (ANTACID) CHEW TAB 500 MG 1000 MG: 500 CHEW TAB at 09:15

## 2025-07-29 RX ADMIN — CALCIUM ACETATE 1334 MG: 667 CAPSULE ORAL at 17:42

## 2025-07-29 RX ADMIN — DOCUSATE SODIUM AND SENNOSIDES 1 TABLET: 8.6; 5 TABLET, FILM COATED ORAL at 21:02

## 2025-07-29 RX ADMIN — CALCIUM GLUCONATE 2 G: 20 INJECTION, SOLUTION INTRAVENOUS at 15:21

## 2025-07-30 LAB
ABO GROUP BLD BPU: NORMAL
ANION GAP SERPL CALCULATED.3IONS-SCNC: 10 MMOL/L (ref 4–13)
ANION GAP SERPL CALCULATED.3IONS-SCNC: 10 MMOL/L (ref 4–13)
ANION GAP SERPL CALCULATED.3IONS-SCNC: 11 MMOL/L (ref 4–13)
BASE EXCESS BLDA CALC-SCNC: -11 MMOL/L (ref -2–3)
BASOPHILS # BLD AUTO: 0.05 THOUSANDS/ÂΜL (ref 0–0.1)
BASOPHILS NFR BLD AUTO: 1 % (ref 0–1)
BPU ID: NORMAL
BUN SERPL-MCNC: 44 MG/DL (ref 5–25)
BUN SERPL-MCNC: 44 MG/DL (ref 5–25)
BUN SERPL-MCNC: 45 MG/DL (ref 5–25)
CA-I BLD-SCNC: 0.85 MMOL/L (ref 1.12–1.32)
CA-I BLD-SCNC: 1.06 MMOL/L (ref 1.12–1.32)
CA-I BLD-SCNC: 1.09 MMOL/L (ref 1.12–1.32)
CA-I BLD-SCNC: 1.13 MMOL/L (ref 1.12–1.32)
CALCIUM SERPL-MCNC: 8.6 MG/DL (ref 8.4–10.2)
CALCIUM SERPL-MCNC: 9 MG/DL (ref 8.4–10.2)
CALCIUM SERPL-MCNC: 9 MG/DL (ref 8.4–10.2)
CHLORIDE SERPL-SCNC: 100 MMOL/L (ref 96–108)
CHLORIDE SERPL-SCNC: 102 MMOL/L (ref 96–108)
CHLORIDE SERPL-SCNC: 102 MMOL/L (ref 96–108)
CO2 SERPL-SCNC: 24 MMOL/L (ref 21–32)
CO2 SERPL-SCNC: 25 MMOL/L (ref 21–32)
CO2 SERPL-SCNC: 25 MMOL/L (ref 21–32)
CREAT SERPL-MCNC: 3.32 MG/DL (ref 0.6–1.3)
CREAT SERPL-MCNC: 3.36 MG/DL (ref 0.6–1.3)
CREAT SERPL-MCNC: 3.43 MG/DL (ref 0.6–1.3)
CROSSMATCH: NORMAL
EOSINOPHIL # BLD AUTO: 0.08 THOUSAND/ÂΜL (ref 0–0.61)
EOSINOPHIL NFR BLD AUTO: 1 % (ref 0–6)
ERYTHROCYTE [DISTWIDTH] IN BLOOD BY AUTOMATED COUNT: 17 % (ref 11.6–15.1)
GFR SERPL CREATININE-BSD FRML MDRD: 13 ML/MIN/1.73SQ M
GLUCOSE SERPL-MCNC: 107 MG/DL (ref 65–140)
GLUCOSE SERPL-MCNC: 109 MG/DL (ref 65–140)
GLUCOSE SERPL-MCNC: 143 MG/DL (ref 65–140)
GLUCOSE SERPL-MCNC: 159 MG/DL (ref 65–140)
GLUCOSE SERPL-MCNC: 70 MG/DL (ref 65–140)
GLUCOSE SERPL-MCNC: 85 MG/DL (ref 65–140)
GLUCOSE SERPL-MCNC: 91 MG/DL (ref 65–140)
GLUCOSE SERPL-MCNC: 92 MG/DL (ref 65–140)
GLUCOSE SERPL-MCNC: 93 MG/DL (ref 65–140)
GLUCOSE SERPL-MCNC: 96 MG/DL (ref 65–140)
HCO3 BLDA-SCNC: 14.7 MMOL/L (ref 24–30)
HCT VFR BLD AUTO: 26.7 % (ref 34.8–46.1)
HCT VFR BLD CALC: 24 % (ref 34.8–46.1)
HGB BLD-MCNC: 8.3 G/DL (ref 11.5–15.4)
HGB BLD-MCNC: 8.5 G/DL (ref 11.5–15.4)
HGB BLDA-MCNC: 8.2 G/DL (ref 11.5–15.4)
IMM GRANULOCYTES # BLD AUTO: 0.18 THOUSAND/UL (ref 0–0.2)
IMM GRANULOCYTES NFR BLD AUTO: 2 % (ref 0–2)
LYMPHOCYTES # BLD AUTO: 0.84 THOUSANDS/ÂΜL (ref 0.6–4.47)
LYMPHOCYTES NFR BLD AUTO: 9 % (ref 14–44)
MAGNESIUM SERPL-MCNC: 2.2 MG/DL (ref 1.9–2.7)
MAGNESIUM SERPL-MCNC: 2.3 MG/DL (ref 1.9–2.7)
MAGNESIUM SERPL-MCNC: 2.4 MG/DL (ref 1.9–2.7)
MCH RBC QN AUTO: 28.3 PG (ref 26.8–34.3)
MCHC RBC AUTO-ENTMCNC: 31.8 G/DL (ref 31.4–37.4)
MCV RBC AUTO: 89 FL (ref 82–98)
MONOCYTES # BLD AUTO: 1.04 THOUSAND/ÂΜL (ref 0.17–1.22)
MONOCYTES NFR BLD AUTO: 11 % (ref 4–12)
NEUTROPHILS # BLD AUTO: 7.51 THOUSANDS/ÂΜL (ref 1.85–7.62)
NEUTS SEG NFR BLD AUTO: 76 % (ref 43–75)
NRBC BLD AUTO-RTO: 0 /100 WBCS
PCO2 BLD: 16 MMOL/L (ref 21–32)
PCO2 BLD: 32.9 MM HG (ref 42–50)
PH BLD: 7.26 [PH] (ref 7.3–7.4)
PHOSPHATE SERPL-MCNC: 4.4 MG/DL (ref 2.3–4.1)
PHOSPHATE SERPL-MCNC: 4.4 MG/DL (ref 2.3–4.1)
PHOSPHATE SERPL-MCNC: 4.8 MG/DL (ref 2.3–4.1)
PLATELET # BLD AUTO: 193 THOUSANDS/UL (ref 149–390)
PMV BLD AUTO: 10.4 FL (ref 8.9–12.7)
PO2 BLD: 21 MM HG (ref 35–45)
POTASSIUM BLD-SCNC: 3.7 MMOL/L (ref 3.5–5.3)
POTASSIUM SERPL-SCNC: 4.3 MMOL/L (ref 3.5–5.3)
POTASSIUM SERPL-SCNC: 4.4 MMOL/L (ref 3.5–5.3)
POTASSIUM SERPL-SCNC: 4.4 MMOL/L (ref 3.5–5.3)
RBC # BLD AUTO: 3 MILLION/UL (ref 3.81–5.12)
SAO2 % BLD FROM PO2: 28 % (ref 60–85)
SODIUM BLD-SCNC: 140 MMOL/L (ref 136–145)
SODIUM SERPL-SCNC: 135 MMOL/L (ref 135–147)
SODIUM SERPL-SCNC: 137 MMOL/L (ref 135–147)
SODIUM SERPL-SCNC: 137 MMOL/L (ref 135–147)
SPECIMEN SOURCE: ABNORMAL
UNIT DISPENSE STATUS: NORMAL
UNIT PRODUCT CODE: NORMAL
UNIT PRODUCT VOLUME: 350 ML
UNIT RH: NORMAL
VANCOMYCIN SERPL-MCNC: 20.4 UG/ML (ref 10–20)
WBC # BLD AUTO: 9.7 THOUSAND/UL (ref 4.31–10.16)

## 2025-07-30 PROCEDURE — NC001 PR NO CHARGE: Performed by: SURGERY

## 2025-07-30 PROCEDURE — G0545 PR INHERENT VISIT TO INPT: HCPCS | Performed by: STUDENT IN AN ORGANIZED HEALTH CARE EDUCATION/TRAINING PROGRAM

## 2025-07-30 PROCEDURE — 82948 REAGENT STRIP/BLOOD GLUCOSE: CPT

## 2025-07-30 PROCEDURE — 82330 ASSAY OF CALCIUM: CPT

## 2025-07-30 PROCEDURE — 99232 SBSQ HOSP IP/OBS MODERATE 35: CPT | Performed by: INTERNAL MEDICINE

## 2025-07-30 PROCEDURE — 85018 HEMOGLOBIN: CPT | Performed by: INTERNAL MEDICINE

## 2025-07-30 PROCEDURE — 85025 COMPLETE CBC W/AUTO DIFF WBC: CPT

## 2025-07-30 PROCEDURE — 99233 SBSQ HOSP IP/OBS HIGH 50: CPT | Performed by: STUDENT IN AN ORGANIZED HEALTH CARE EDUCATION/TRAINING PROGRAM

## 2025-07-30 PROCEDURE — 80048 BASIC METABOLIC PNL TOTAL CA: CPT

## 2025-07-30 PROCEDURE — 84100 ASSAY OF PHOSPHORUS: CPT

## 2025-07-30 PROCEDURE — 99232 SBSQ HOSP IP/OBS MODERATE 35: CPT | Performed by: SURGERY

## 2025-07-30 PROCEDURE — 83735 ASSAY OF MAGNESIUM: CPT

## 2025-07-30 PROCEDURE — 80202 ASSAY OF VANCOMYCIN: CPT | Performed by: STUDENT IN AN ORGANIZED HEALTH CARE EDUCATION/TRAINING PROGRAM

## 2025-07-30 PROCEDURE — 99232 SBSQ HOSP IP/OBS MODERATE 35: CPT | Performed by: UROLOGY

## 2025-07-30 RX ORDER — CALCIUM CARBONATE 500 MG/1
1000 TABLET, CHEWABLE ORAL 2 TIMES DAILY
Status: DISCONTINUED | OUTPATIENT
Start: 2025-07-30 | End: 2025-08-06 | Stop reason: HOSPADM

## 2025-07-30 RX ORDER — SODIUM CHLORIDE, SODIUM GLUCONATE, SODIUM ACETATE, POTASSIUM CHLORIDE, MAGNESIUM CHLORIDE, SODIUM PHOSPHATE, DIBASIC, AND POTASSIUM PHOSPHATE .53; .5; .37; .037; .03; .012; .00082 G/100ML; G/100ML; G/100ML; G/100ML; G/100ML; G/100ML; G/100ML
50 INJECTION, SOLUTION INTRAVENOUS CONTINUOUS
Status: DISCONTINUED | OUTPATIENT
Start: 2025-07-30 | End: 2025-08-02

## 2025-07-30 RX ADMIN — VENLAFAXINE HYDROCHLORIDE 75 MG: 75 CAPSULE, EXTENDED RELEASE ORAL at 09:25

## 2025-07-30 RX ADMIN — ALTEPLASE 2 MG: 2.2 INJECTION, POWDER, LYOPHILIZED, FOR SOLUTION INTRAVENOUS at 04:36

## 2025-07-30 RX ADMIN — CALCIUM CARBONATE (ANTACID) CHEW TAB 500 MG 1000 MG: 500 CHEW TAB at 17:05

## 2025-07-30 RX ADMIN — CALCIUM CARBONATE (ANTACID) CHEW TAB 500 MG 1000 MG: 500 CHEW TAB at 09:25

## 2025-07-30 RX ADMIN — CALCIUM ACETATE 1334 MG: 667 CAPSULE ORAL at 09:25

## 2025-07-30 RX ADMIN — PANTOPRAZOLE SODIUM 20 MG: 20 TABLET, DELAYED RELEASE ORAL at 05:00

## 2025-07-30 RX ADMIN — DOCUSATE SODIUM AND SENNOSIDES 1 TABLET: 8.6; 5 TABLET, FILM COATED ORAL at 21:00

## 2025-07-30 RX ADMIN — FOLIC ACID 1 MG: 1 TABLET ORAL at 09:25

## 2025-07-30 RX ADMIN — ARIPIPRAZOLE 20 MG: 5 TABLET ORAL at 09:25

## 2025-07-30 RX ADMIN — POLYETHYLENE GLYCOL 3350 17 G: 17 POWDER, FOR SOLUTION ORAL at 09:25

## 2025-07-30 RX ADMIN — CALCITRIOL 0.25 MCG: 0.25 CAPSULE, LIQUID FILLED ORAL at 09:25

## 2025-07-30 RX ADMIN — SODIUM CHLORIDE, SODIUM GLUCONATE, SODIUM ACETATE, POTASSIUM CHLORIDE, MAGNESIUM CHLORIDE, SODIUM PHOSPHATE, DIBASIC, AND POTASSIUM PHOSPHATE 75 ML/HR: .53; .5; .37; .037; .03; .012; .00082 INJECTION, SOLUTION INTRAVENOUS at 09:26

## 2025-07-30 RX ADMIN — HEPARIN SODIUM 5000 UNITS: 5000 INJECTION INTRAVENOUS; SUBCUTANEOUS at 21:00

## 2025-07-30 RX ADMIN — CALCIUM ACETATE 1334 MG: 667 CAPSULE ORAL at 17:05

## 2025-07-30 RX ADMIN — HEPARIN SODIUM 5000 UNITS: 5000 INJECTION INTRAVENOUS; SUBCUTANEOUS at 13:57

## 2025-07-30 RX ADMIN — CEFEPIME 1000 MG: 1 INJECTION, POWDER, FOR SOLUTION INTRAMUSCULAR; INTRAVENOUS at 17:06

## 2025-07-30 RX ADMIN — Medication 3 MG: at 21:01

## 2025-07-30 RX ADMIN — HEPARIN SODIUM 5000 UNITS: 5000 INJECTION INTRAVENOUS; SUBCUTANEOUS at 05:00

## 2025-07-30 RX ADMIN — CEFEPIME 1000 MG: 1 INJECTION, POWDER, FOR SOLUTION INTRAMUSCULAR; INTRAVENOUS at 05:00

## 2025-07-31 LAB
ANION GAP SERPL CALCULATED.3IONS-SCNC: 9 MMOL/L (ref 4–13)
ANISOCYTOSIS BLD QL SMEAR: PRESENT
BACTERIA BLD CULT: NORMAL
BACTERIA BLD CULT: NORMAL
BACTERIA UR CULT: ABNORMAL
BACTERIA UR CULT: ABNORMAL
BASOPHILS # BLD MANUAL: 0.09 THOUSAND/UL (ref 0–0.1)
BASOPHILS NFR MAR MANUAL: 1 % (ref 0–1)
BUN SERPL-MCNC: 44 MG/DL (ref 5–25)
CALCIUM SERPL-MCNC: 8.6 MG/DL (ref 8.4–10.2)
CHLORIDE SERPL-SCNC: 102 MMOL/L (ref 96–108)
CO2 SERPL-SCNC: 28 MMOL/L (ref 21–32)
CREAT SERPL-MCNC: 3.47 MG/DL (ref 0.6–1.3)
EOSINOPHIL # BLD MANUAL: 0.09 THOUSAND/UL (ref 0–0.4)
EOSINOPHIL NFR BLD MANUAL: 1 % (ref 0–6)
ERYTHROCYTE [DISTWIDTH] IN BLOOD BY AUTOMATED COUNT: 16.2 % (ref 11.6–15.1)
GFR SERPL CREATININE-BSD FRML MDRD: 12 ML/MIN/1.73SQ M
GLUCOSE SERPL-MCNC: 102 MG/DL (ref 65–140)
GLUCOSE SERPL-MCNC: 102 MG/DL (ref 65–140)
GLUCOSE SERPL-MCNC: 104 MG/DL (ref 65–140)
GLUCOSE SERPL-MCNC: 105 MG/DL (ref 65–140)
GLUCOSE SERPL-MCNC: 79 MG/DL (ref 65–140)
GLUCOSE SERPL-MCNC: 90 MG/DL (ref 65–140)
GLUCOSE SERPL-MCNC: 99 MG/DL (ref 65–140)
HCT VFR BLD AUTO: 25.9 % (ref 34.8–46.1)
HGB BLD-MCNC: 7.5 G/DL (ref 11.5–15.4)
HGB BLD-MCNC: 8.1 G/DL (ref 11.5–15.4)
HGB BLD-MCNC: 8.8 G/DL (ref 11.5–15.4)
LYMPHOCYTES # BLD AUTO: 0.92 THOUSAND/UL (ref 0.6–4.47)
LYMPHOCYTES # BLD AUTO: 10 % (ref 14–44)
MAGNESIUM SERPL-MCNC: 2.2 MG/DL (ref 1.9–2.7)
MCH RBC QN AUTO: 28.3 PG (ref 26.8–34.3)
MCHC RBC AUTO-ENTMCNC: 31.3 G/DL (ref 31.4–37.4)
MCV RBC AUTO: 91 FL (ref 82–98)
METAMYELOCYTE ABSOLUTE CT: 0.18 THOUSAND/UL (ref 0–0.1)
METAMYELOCYTES NFR BLD MANUAL: 2 % (ref 0–1)
MONOCYTES # BLD AUTO: 0.46 THOUSAND/UL (ref 0–1.22)
MONOCYTES NFR BLD: 5 % (ref 4–12)
MYELOCYTE ABSOLUTE CT: 0.09 THOUSAND/UL (ref 0–0.1)
MYELOCYTES NFR BLD MANUAL: 1 % (ref 0–1)
NEUTROPHILS # BLD MANUAL: 7.38 THOUSAND/UL (ref 1.85–7.62)
NEUTS BAND NFR BLD MANUAL: 3 % (ref 0–8)
NEUTS SEG NFR BLD AUTO: 77 % (ref 43–75)
PHOSPHATE SERPL-MCNC: 4.2 MG/DL (ref 2.3–4.1)
PLATELET # BLD AUTO: 193 THOUSANDS/UL (ref 149–390)
PLATELET BLD QL SMEAR: ADEQUATE
PMV BLD AUTO: 10.2 FL (ref 8.9–12.7)
POTASSIUM SERPL-SCNC: 4 MMOL/L (ref 3.5–5.3)
RBC # BLD AUTO: 2.86 MILLION/UL (ref 3.81–5.12)
RBC MORPH BLD: PRESENT
SODIUM SERPL-SCNC: 139 MMOL/L (ref 135–147)
WBC # BLD AUTO: 9.23 THOUSAND/UL (ref 4.31–10.16)

## 2025-07-31 PROCEDURE — 85007 BL SMEAR W/DIFF WBC COUNT: CPT

## 2025-07-31 PROCEDURE — 99233 SBSQ HOSP IP/OBS HIGH 50: CPT | Performed by: STUDENT IN AN ORGANIZED HEALTH CARE EDUCATION/TRAINING PROGRAM

## 2025-07-31 PROCEDURE — 82948 REAGENT STRIP/BLOOD GLUCOSE: CPT

## 2025-07-31 PROCEDURE — NC001 PR NO CHARGE: Performed by: STUDENT IN AN ORGANIZED HEALTH CARE EDUCATION/TRAINING PROGRAM

## 2025-07-31 PROCEDURE — G0545 PR INHERENT VISIT TO INPT: HCPCS | Performed by: STUDENT IN AN ORGANIZED HEALTH CARE EDUCATION/TRAINING PROGRAM

## 2025-07-31 PROCEDURE — 85027 COMPLETE CBC AUTOMATED: CPT

## 2025-07-31 PROCEDURE — 99232 SBSQ HOSP IP/OBS MODERATE 35: CPT | Performed by: PHYSICIAN ASSISTANT

## 2025-07-31 PROCEDURE — 99232 SBSQ HOSP IP/OBS MODERATE 35: CPT | Performed by: INTERNAL MEDICINE

## 2025-07-31 PROCEDURE — 99232 SBSQ HOSP IP/OBS MODERATE 35: CPT

## 2025-07-31 PROCEDURE — 83735 ASSAY OF MAGNESIUM: CPT | Performed by: INTERNAL MEDICINE

## 2025-07-31 PROCEDURE — 85018 HEMOGLOBIN: CPT | Performed by: INTERNAL MEDICINE

## 2025-07-31 PROCEDURE — 84100 ASSAY OF PHOSPHORUS: CPT | Performed by: INTERNAL MEDICINE

## 2025-07-31 PROCEDURE — 80048 BASIC METABOLIC PNL TOTAL CA: CPT | Performed by: INTERNAL MEDICINE

## 2025-07-31 RX ORDER — CLOPIDOGREL BISULFATE 75 MG/1
75 TABLET ORAL DAILY
Status: DISCONTINUED | OUTPATIENT
Start: 2025-07-31 | End: 2025-08-06 | Stop reason: HOSPADM

## 2025-07-31 RX ORDER — AMOXICILLIN 250 MG
2 CAPSULE ORAL 2 TIMES DAILY
Status: DISCONTINUED | OUTPATIENT
Start: 2025-07-31 | End: 2025-08-06 | Stop reason: HOSPADM

## 2025-07-31 RX ADMIN — HEPARIN SODIUM 5000 UNITS: 5000 INJECTION INTRAVENOUS; SUBCUTANEOUS at 05:34

## 2025-07-31 RX ADMIN — POLYETHYLENE GLYCOL 3350 17 G: 17 POWDER, FOR SOLUTION ORAL at 09:15

## 2025-07-31 RX ADMIN — VENLAFAXINE HYDROCHLORIDE 75 MG: 75 CAPSULE, EXTENDED RELEASE ORAL at 09:11

## 2025-07-31 RX ADMIN — FOLIC ACID 1 MG: 1 TABLET ORAL at 09:11

## 2025-07-31 RX ADMIN — CALCIUM ACETATE 1334 MG: 667 CAPSULE ORAL at 17:38

## 2025-07-31 RX ADMIN — CALCITRIOL 0.25 MCG: 0.25 CAPSULE, LIQUID FILLED ORAL at 09:11

## 2025-07-31 RX ADMIN — ARIPIPRAZOLE 20 MG: 5 TABLET ORAL at 09:11

## 2025-07-31 RX ADMIN — CEFEPIME 1000 MG: 1 INJECTION, POWDER, FOR SOLUTION INTRAMUSCULAR; INTRAVENOUS at 06:01

## 2025-07-31 RX ADMIN — CALCIUM CARBONATE (ANTACID) CHEW TAB 500 MG 1000 MG: 500 CHEW TAB at 17:38

## 2025-07-31 RX ADMIN — Medication 3 MG: at 21:53

## 2025-07-31 RX ADMIN — HEPARIN SODIUM 5000 UNITS: 5000 INJECTION INTRAVENOUS; SUBCUTANEOUS at 21:53

## 2025-07-31 RX ADMIN — HEPARIN SODIUM 5000 UNITS: 5000 INJECTION INTRAVENOUS; SUBCUTANEOUS at 14:49

## 2025-07-31 RX ADMIN — SODIUM CHLORIDE, SODIUM GLUCONATE, SODIUM ACETATE, POTASSIUM CHLORIDE, MAGNESIUM CHLORIDE, SODIUM PHOSPHATE, DIBASIC, AND POTASSIUM PHOSPHATE 75 ML/HR: .53; .5; .37; .037; .03; .012; .00082 INJECTION, SOLUTION INTRAVENOUS at 00:00

## 2025-07-31 RX ADMIN — CEFEPIME 1000 MG: 1 INJECTION, POWDER, FOR SOLUTION INTRAMUSCULAR; INTRAVENOUS at 17:43

## 2025-07-31 RX ADMIN — PANTOPRAZOLE SODIUM 20 MG: 20 TABLET, DELAYED RELEASE ORAL at 05:34

## 2025-07-31 RX ADMIN — CALCIUM CARBONATE (ANTACID) CHEW TAB 500 MG 1000 MG: 500 CHEW TAB at 09:14

## 2025-07-31 RX ADMIN — SODIUM CHLORIDE, SODIUM GLUCONATE, SODIUM ACETATE, POTASSIUM CHLORIDE, MAGNESIUM CHLORIDE, SODIUM PHOSPHATE, DIBASIC, AND POTASSIUM PHOSPHATE 75 ML/HR: .53; .5; .37; .037; .03; .012; .00082 INJECTION, SOLUTION INTRAVENOUS at 19:30

## 2025-07-31 RX ADMIN — DOCUSATE SODIUM AND SENNOSIDES 2 TABLET: 8.6; 5 TABLET, FILM COATED ORAL at 17:38

## 2025-07-31 RX ADMIN — CALCIUM ACETATE 1334 MG: 667 CAPSULE ORAL at 09:11

## 2025-07-31 RX ADMIN — CLOPIDOGREL 75 MG: 75 TABLET ORAL at 09:58

## 2025-07-31 RX ADMIN — DOCUSATE SODIUM AND SENNOSIDES 2 TABLET: 8.6; 5 TABLET, FILM COATED ORAL at 09:11

## 2025-08-01 LAB
ANION GAP SERPL CALCULATED.3IONS-SCNC: 10 MMOL/L (ref 4–13)
BACTERIA UR CULT: ABNORMAL
BACTERIA UR CULT: ABNORMAL
BUN SERPL-MCNC: 46 MG/DL (ref 5–25)
CALCIUM SERPL-MCNC: 8.1 MG/DL (ref 8.4–10.2)
CHLORIDE SERPL-SCNC: 101 MMOL/L (ref 96–108)
CO2 SERPL-SCNC: 26 MMOL/L (ref 21–32)
CREAT SERPL-MCNC: 3.38 MG/DL (ref 0.6–1.3)
ERYTHROCYTE [DISTWIDTH] IN BLOOD BY AUTOMATED COUNT: 15.7 % (ref 11.6–15.1)
GFR SERPL CREATININE-BSD FRML MDRD: 13 ML/MIN/1.73SQ M
GLUCOSE SERPL-MCNC: 110 MG/DL (ref 65–140)
GLUCOSE SERPL-MCNC: 131 MG/DL (ref 65–140)
GLUCOSE SERPL-MCNC: 82 MG/DL (ref 65–140)
GLUCOSE SERPL-MCNC: 82 MG/DL (ref 65–140)
GLUCOSE SERPL-MCNC: 83 MG/DL (ref 65–140)
GLUCOSE SERPL-MCNC: 93 MG/DL (ref 65–140)
GLUCOSE SERPL-MCNC: 98 MG/DL (ref 65–140)
HCT VFR BLD AUTO: 26.9 % (ref 34.8–46.1)
HGB BLD-MCNC: 8.4 G/DL (ref 11.5–15.4)
MCH RBC QN AUTO: 28.9 PG (ref 26.8–34.3)
MCHC RBC AUTO-ENTMCNC: 31.2 G/DL (ref 31.4–37.4)
MCV RBC AUTO: 92 FL (ref 82–98)
PLATELET # BLD AUTO: 201 THOUSANDS/UL (ref 149–390)
PMV BLD AUTO: 10 FL (ref 8.9–12.7)
POTASSIUM SERPL-SCNC: 3.7 MMOL/L (ref 3.5–5.3)
RBC # BLD AUTO: 2.91 MILLION/UL (ref 3.81–5.12)
SODIUM SERPL-SCNC: 137 MMOL/L (ref 135–147)
WBC # BLD AUTO: 9.67 THOUSAND/UL (ref 4.31–10.16)

## 2025-08-01 PROCEDURE — NC001 PR NO CHARGE: Performed by: RADIOLOGY

## 2025-08-01 PROCEDURE — 99232 SBSQ HOSP IP/OBS MODERATE 35: CPT

## 2025-08-01 PROCEDURE — G0545 PR INHERENT VISIT TO INPT: HCPCS | Performed by: STUDENT IN AN ORGANIZED HEALTH CARE EDUCATION/TRAINING PROGRAM

## 2025-08-01 PROCEDURE — 99232 SBSQ HOSP IP/OBS MODERATE 35: CPT | Performed by: UROLOGY

## 2025-08-01 PROCEDURE — 80048 BASIC METABOLIC PNL TOTAL CA: CPT | Performed by: INTERNAL MEDICINE

## 2025-08-01 PROCEDURE — 99232 SBSQ HOSP IP/OBS MODERATE 35: CPT | Performed by: INTERNAL MEDICINE

## 2025-08-01 PROCEDURE — 99233 SBSQ HOSP IP/OBS HIGH 50: CPT | Performed by: STUDENT IN AN ORGANIZED HEALTH CARE EDUCATION/TRAINING PROGRAM

## 2025-08-01 PROCEDURE — 82948 REAGENT STRIP/BLOOD GLUCOSE: CPT

## 2025-08-01 PROCEDURE — 85027 COMPLETE CBC AUTOMATED: CPT

## 2025-08-01 RX ADMIN — CALCIUM ACETATE 1334 MG: 667 CAPSULE ORAL at 17:04

## 2025-08-01 RX ADMIN — VENLAFAXINE HYDROCHLORIDE 75 MG: 75 CAPSULE, EXTENDED RELEASE ORAL at 08:29

## 2025-08-01 RX ADMIN — CEFEPIME 1000 MG: 1 INJECTION, POWDER, FOR SOLUTION INTRAMUSCULAR; INTRAVENOUS at 17:05

## 2025-08-01 RX ADMIN — ARIPIPRAZOLE 20 MG: 5 TABLET ORAL at 08:29

## 2025-08-01 RX ADMIN — POLYETHYLENE GLYCOL 3350 17 G: 17 POWDER, FOR SOLUTION ORAL at 08:29

## 2025-08-01 RX ADMIN — HEPARIN SODIUM 5000 UNITS: 5000 INJECTION INTRAVENOUS; SUBCUTANEOUS at 14:13

## 2025-08-01 RX ADMIN — CEFEPIME 1000 MG: 1 INJECTION, POWDER, FOR SOLUTION INTRAMUSCULAR; INTRAVENOUS at 05:14

## 2025-08-01 RX ADMIN — HEPARIN SODIUM 5000 UNITS: 5000 INJECTION INTRAVENOUS; SUBCUTANEOUS at 05:15

## 2025-08-01 RX ADMIN — DOCUSATE SODIUM AND SENNOSIDES 2 TABLET: 8.6; 5 TABLET, FILM COATED ORAL at 08:29

## 2025-08-01 RX ADMIN — CALCITRIOL 0.25 MCG: 0.25 CAPSULE, LIQUID FILLED ORAL at 08:29

## 2025-08-01 RX ADMIN — CALCIUM ACETATE 1334 MG: 667 CAPSULE ORAL at 08:29

## 2025-08-01 RX ADMIN — CALCIUM CARBONATE (ANTACID) CHEW TAB 500 MG 1000 MG: 500 CHEW TAB at 17:04

## 2025-08-01 RX ADMIN — PANTOPRAZOLE SODIUM 20 MG: 20 TABLET, DELAYED RELEASE ORAL at 05:15

## 2025-08-01 RX ADMIN — SODIUM CHLORIDE, SODIUM GLUCONATE, SODIUM ACETATE, POTASSIUM CHLORIDE, MAGNESIUM CHLORIDE, SODIUM PHOSPHATE, DIBASIC, AND POTASSIUM PHOSPHATE 50 ML/HR: .53; .5; .37; .037; .03; .012; .00082 INJECTION, SOLUTION INTRAVENOUS at 10:12

## 2025-08-01 RX ADMIN — HEPARIN SODIUM 5000 UNITS: 5000 INJECTION INTRAVENOUS; SUBCUTANEOUS at 21:29

## 2025-08-01 RX ADMIN — CALCIUM CARBONATE (ANTACID) CHEW TAB 500 MG 1000 MG: 500 CHEW TAB at 08:29

## 2025-08-01 RX ADMIN — FOLIC ACID 1 MG: 1 TABLET ORAL at 08:29

## 2025-08-01 RX ADMIN — Medication 3 MG: at 21:29

## 2025-08-02 LAB
ANION GAP SERPL CALCULATED.3IONS-SCNC: 9 MMOL/L (ref 4–13)
BACTERIA BLD CULT: NORMAL
BACTERIA BLD CULT: NORMAL
BUN SERPL-MCNC: 46 MG/DL (ref 5–25)
CALCIUM SERPL-MCNC: 8 MG/DL (ref 8.4–10.2)
CHLORIDE SERPL-SCNC: 104 MMOL/L (ref 96–108)
CO2 SERPL-SCNC: 25 MMOL/L (ref 21–32)
CREAT SERPL-MCNC: 3.26 MG/DL (ref 0.6–1.3)
ERYTHROCYTE [DISTWIDTH] IN BLOOD BY AUTOMATED COUNT: 15.6 % (ref 11.6–15.1)
GFR SERPL CREATININE-BSD FRML MDRD: 14 ML/MIN/1.73SQ M
GLUCOSE SERPL-MCNC: 111 MG/DL (ref 65–140)
GLUCOSE SERPL-MCNC: 154 MG/DL (ref 65–140)
GLUCOSE SERPL-MCNC: 80 MG/DL (ref 65–140)
GLUCOSE SERPL-MCNC: 84 MG/DL (ref 65–140)
GLUCOSE SERPL-MCNC: 96 MG/DL (ref 65–140)
HCT VFR BLD AUTO: 27.7 % (ref 34.8–46.1)
HGB BLD-MCNC: 8.5 G/DL (ref 11.5–15.4)
MCH RBC QN AUTO: 28.1 PG (ref 26.8–34.3)
MCHC RBC AUTO-ENTMCNC: 30.7 G/DL (ref 31.4–37.4)
MCV RBC AUTO: 92 FL (ref 82–98)
PLATELET # BLD AUTO: 227 THOUSANDS/UL (ref 149–390)
PMV BLD AUTO: 9.6 FL (ref 8.9–12.7)
POTASSIUM SERPL-SCNC: 3.8 MMOL/L (ref 3.5–5.3)
RBC # BLD AUTO: 3.02 MILLION/UL (ref 3.81–5.12)
SODIUM SERPL-SCNC: 138 MMOL/L (ref 135–147)
WBC # BLD AUTO: 10.06 THOUSAND/UL (ref 4.31–10.16)

## 2025-08-02 PROCEDURE — 80048 BASIC METABOLIC PNL TOTAL CA: CPT | Performed by: INTERNAL MEDICINE

## 2025-08-02 PROCEDURE — 99232 SBSQ HOSP IP/OBS MODERATE 35: CPT | Performed by: INTERNAL MEDICINE

## 2025-08-02 PROCEDURE — 99233 SBSQ HOSP IP/OBS HIGH 50: CPT | Performed by: INTERNAL MEDICINE

## 2025-08-02 PROCEDURE — 85027 COMPLETE CBC AUTOMATED: CPT

## 2025-08-02 PROCEDURE — 82948 REAGENT STRIP/BLOOD GLUCOSE: CPT

## 2025-08-02 RX ORDER — SODIUM CHLORIDE, SODIUM GLUCONATE, SODIUM ACETATE, POTASSIUM CHLORIDE, MAGNESIUM CHLORIDE, SODIUM PHOSPHATE, DIBASIC, AND POTASSIUM PHOSPHATE .53; .5; .37; .037; .03; .012; .00082 G/100ML; G/100ML; G/100ML; G/100ML; G/100ML; G/100ML; G/100ML
50 INJECTION, SOLUTION INTRAVENOUS CONTINUOUS
Status: DISPENSED | OUTPATIENT
Start: 2025-08-02 | End: 2025-08-02

## 2025-08-02 RX ADMIN — CEFEPIME 1000 MG: 1 INJECTION, POWDER, FOR SOLUTION INTRAMUSCULAR; INTRAVENOUS at 04:58

## 2025-08-02 RX ADMIN — PANTOPRAZOLE SODIUM 20 MG: 20 TABLET, DELAYED RELEASE ORAL at 05:00

## 2025-08-02 RX ADMIN — ARIPIPRAZOLE 20 MG: 5 TABLET ORAL at 08:31

## 2025-08-02 RX ADMIN — HEPARIN SODIUM 5000 UNITS: 5000 INJECTION INTRAVENOUS; SUBCUTANEOUS at 22:46

## 2025-08-02 RX ADMIN — DOCUSATE SODIUM AND SENNOSIDES 2 TABLET: 8.6; 5 TABLET, FILM COATED ORAL at 17:42

## 2025-08-02 RX ADMIN — CEFEPIME 1000 MG: 1 INJECTION, POWDER, FOR SOLUTION INTRAMUSCULAR; INTRAVENOUS at 17:43

## 2025-08-02 RX ADMIN — CALCIUM CARBONATE (ANTACID) CHEW TAB 500 MG 1000 MG: 500 CHEW TAB at 08:31

## 2025-08-02 RX ADMIN — CALCIUM ACETATE 1334 MG: 667 CAPSULE ORAL at 17:42

## 2025-08-02 RX ADMIN — DOCUSATE SODIUM AND SENNOSIDES 2 TABLET: 8.6; 5 TABLET, FILM COATED ORAL at 08:31

## 2025-08-02 RX ADMIN — VENLAFAXINE HYDROCHLORIDE 75 MG: 75 CAPSULE, EXTENDED RELEASE ORAL at 08:31

## 2025-08-02 RX ADMIN — POLYETHYLENE GLYCOL 3350 17 G: 17 POWDER, FOR SOLUTION ORAL at 08:32

## 2025-08-02 RX ADMIN — HEPARIN SODIUM 5000 UNITS: 5000 INJECTION INTRAVENOUS; SUBCUTANEOUS at 15:09

## 2025-08-02 RX ADMIN — CALCIUM ACETATE 1334 MG: 667 CAPSULE ORAL at 08:31

## 2025-08-02 RX ADMIN — FOLIC ACID 1 MG: 1 TABLET ORAL at 08:32

## 2025-08-02 RX ADMIN — CALCITRIOL 0.25 MCG: 0.25 CAPSULE, LIQUID FILLED ORAL at 08:31

## 2025-08-02 RX ADMIN — Medication 3 MG: at 22:46

## 2025-08-02 RX ADMIN — HEPARIN SODIUM 5000 UNITS: 5000 INJECTION INTRAVENOUS; SUBCUTANEOUS at 04:58

## 2025-08-02 RX ADMIN — CALCIUM CARBONATE (ANTACID) CHEW TAB 500 MG 1000 MG: 500 CHEW TAB at 17:42

## 2025-08-03 LAB
ANION GAP SERPL CALCULATED.3IONS-SCNC: 10 MMOL/L (ref 4–13)
BUN SERPL-MCNC: 42 MG/DL (ref 5–25)
CALCIUM SERPL-MCNC: 8 MG/DL (ref 8.4–10.2)
CHLORIDE SERPL-SCNC: 106 MMOL/L (ref 96–108)
CO2 SERPL-SCNC: 25 MMOL/L (ref 21–32)
CREAT SERPL-MCNC: 3.19 MG/DL (ref 0.6–1.3)
ERYTHROCYTE [DISTWIDTH] IN BLOOD BY AUTOMATED COUNT: 15.7 % (ref 11.6–15.1)
GFR SERPL CREATININE-BSD FRML MDRD: 14 ML/MIN/1.73SQ M
GLUCOSE SERPL-MCNC: 84 MG/DL (ref 65–140)
GLUCOSE SERPL-MCNC: 94 MG/DL (ref 65–140)
HCT VFR BLD AUTO: 26.8 % (ref 34.8–46.1)
HGB BLD-MCNC: 8 G/DL (ref 11.5–15.4)
MCH RBC QN AUTO: 27.9 PG (ref 26.8–34.3)
MCHC RBC AUTO-ENTMCNC: 29.9 G/DL (ref 31.4–37.4)
MCV RBC AUTO: 93 FL (ref 82–98)
PLATELET # BLD AUTO: 267 THOUSANDS/UL (ref 149–390)
PMV BLD AUTO: 10.2 FL (ref 8.9–12.7)
POTASSIUM SERPL-SCNC: 3.6 MMOL/L (ref 3.5–5.3)
RBC # BLD AUTO: 2.87 MILLION/UL (ref 3.81–5.12)
SODIUM SERPL-SCNC: 141 MMOL/L (ref 135–147)
WBC # BLD AUTO: 10.02 THOUSAND/UL (ref 4.31–10.16)

## 2025-08-03 PROCEDURE — 82948 REAGENT STRIP/BLOOD GLUCOSE: CPT

## 2025-08-03 PROCEDURE — 80048 BASIC METABOLIC PNL TOTAL CA: CPT

## 2025-08-03 PROCEDURE — 99233 SBSQ HOSP IP/OBS HIGH 50: CPT | Performed by: INTERNAL MEDICINE

## 2025-08-03 PROCEDURE — 85027 COMPLETE CBC AUTOMATED: CPT

## 2025-08-03 PROCEDURE — 99232 SBSQ HOSP IP/OBS MODERATE 35: CPT | Performed by: INTERNAL MEDICINE

## 2025-08-03 RX ADMIN — CALCIUM CARBONATE (ANTACID) CHEW TAB 500 MG 1000 MG: 500 CHEW TAB at 17:41

## 2025-08-03 RX ADMIN — VENLAFAXINE HYDROCHLORIDE 75 MG: 75 CAPSULE, EXTENDED RELEASE ORAL at 08:01

## 2025-08-03 RX ADMIN — CEFEPIME 1000 MG: 1 INJECTION, POWDER, FOR SOLUTION INTRAMUSCULAR; INTRAVENOUS at 05:33

## 2025-08-03 RX ADMIN — CALCITRIOL 0.25 MCG: 0.25 CAPSULE, LIQUID FILLED ORAL at 08:01

## 2025-08-03 RX ADMIN — CALCIUM CARBONATE (ANTACID) CHEW TAB 500 MG 1000 MG: 500 CHEW TAB at 08:01

## 2025-08-03 RX ADMIN — Medication 3 MG: at 21:47

## 2025-08-03 RX ADMIN — DOCUSATE SODIUM AND SENNOSIDES 2 TABLET: 8.6; 5 TABLET, FILM COATED ORAL at 08:01

## 2025-08-03 RX ADMIN — CALCIUM ACETATE 1334 MG: 667 CAPSULE ORAL at 17:41

## 2025-08-03 RX ADMIN — HEPARIN SODIUM 5000 UNITS: 5000 INJECTION INTRAVENOUS; SUBCUTANEOUS at 05:34

## 2025-08-03 RX ADMIN — FOLIC ACID 1 MG: 1 TABLET ORAL at 08:01

## 2025-08-03 RX ADMIN — PANTOPRAZOLE SODIUM 20 MG: 20 TABLET, DELAYED RELEASE ORAL at 05:33

## 2025-08-03 RX ADMIN — HEPARIN SODIUM 5000 UNITS: 5000 INJECTION INTRAVENOUS; SUBCUTANEOUS at 13:17

## 2025-08-03 RX ADMIN — CALCIUM ACETATE 1334 MG: 667 CAPSULE ORAL at 08:01

## 2025-08-03 RX ADMIN — ARIPIPRAZOLE 20 MG: 5 TABLET ORAL at 08:00

## 2025-08-03 RX ADMIN — CEFEPIME 1000 MG: 1 INJECTION, POWDER, FOR SOLUTION INTRAMUSCULAR; INTRAVENOUS at 17:41

## 2025-08-03 RX ADMIN — HEPARIN SODIUM 5000 UNITS: 5000 INJECTION INTRAVENOUS; SUBCUTANEOUS at 21:51

## 2025-08-04 ENCOUNTER — APPOINTMENT (INPATIENT)
Dept: RADIOLOGY | Facility: HOSPITAL | Age: 70
DRG: 673 | End: 2025-08-04
Payer: COMMERCIAL

## 2025-08-04 ENCOUNTER — ANESTHESIA EVENT (INPATIENT)
Dept: RADIOLOGY | Facility: HOSPITAL | Age: 70
DRG: 673 | End: 2025-08-04
Payer: COMMERCIAL

## 2025-08-04 ENCOUNTER — ANESTHESIA (INPATIENT)
Dept: RADIOLOGY | Facility: HOSPITAL | Age: 70
DRG: 673 | End: 2025-08-04
Payer: COMMERCIAL

## 2025-08-04 ENCOUNTER — APPOINTMENT (INPATIENT)
Dept: RADIOLOGY | Facility: HOSPITAL | Age: 70
DRG: 673 | End: 2025-08-04
Attending: INTERNAL MEDICINE
Payer: COMMERCIAL

## 2025-08-04 LAB
ANION GAP SERPL CALCULATED.3IONS-SCNC: 9 MMOL/L (ref 4–13)
BUN SERPL-MCNC: 44 MG/DL (ref 5–25)
CALCIUM SERPL-MCNC: 8.1 MG/DL (ref 8.4–10.2)
CHLORIDE SERPL-SCNC: 109 MMOL/L (ref 96–108)
CO2 SERPL-SCNC: 23 MMOL/L (ref 21–32)
CREAT SERPL-MCNC: 3.18 MG/DL (ref 0.6–1.3)
ERYTHROCYTE [DISTWIDTH] IN BLOOD BY AUTOMATED COUNT: 15.6 % (ref 11.6–15.1)
GFR SERPL CREATININE-BSD FRML MDRD: 14 ML/MIN/1.73SQ M
GLUCOSE SERPL-MCNC: 85 MG/DL (ref 65–140)
HCT VFR BLD AUTO: 26 % (ref 34.8–46.1)
HCT VFR BLD AUTO: 27.6 % (ref 34.8–46.1)
HGB BLD-MCNC: 7.7 G/DL (ref 11.5–15.4)
HGB BLD-MCNC: 8.4 G/DL (ref 11.5–15.4)
INR PPP: 1.04 (ref 0.85–1.19)
MCH RBC QN AUTO: 28.3 PG (ref 26.8–34.3)
MCHC RBC AUTO-ENTMCNC: 30.4 G/DL (ref 31.4–37.4)
MCV RBC AUTO: 93 FL (ref 82–98)
PLATELET # BLD AUTO: 288 THOUSANDS/UL (ref 149–390)
PMV BLD AUTO: 9.6 FL (ref 8.9–12.7)
POTASSIUM SERPL-SCNC: 3.5 MMOL/L (ref 3.5–5.3)
PROTHROMBIN TIME: 14.3 SECONDS (ref 12.3–15)
RBC # BLD AUTO: 2.97 MILLION/UL (ref 3.81–5.12)
SODIUM SERPL-SCNC: 141 MMOL/L (ref 135–147)
WBC # BLD AUTO: 11.06 THOUSAND/UL (ref 4.31–10.16)

## 2025-08-04 PROCEDURE — C1751 CATH, INF, PER/CENT/MIDLINE: HCPCS

## 2025-08-04 PROCEDURE — 85610 PROTHROMBIN TIME: CPT | Performed by: INTERNAL MEDICINE

## 2025-08-04 PROCEDURE — 50432 PLMT NEPHROSTOMY CATHETER: CPT | Performed by: INTERNAL MEDICINE

## 2025-08-04 PROCEDURE — 0T9430Z DRAINAGE OF LEFT KIDNEY PELVIS WITH DRAINAGE DEVICE, PERCUTANEOUS APPROACH: ICD-10-PCS | Performed by: INTERNAL MEDICINE

## 2025-08-04 PROCEDURE — 85018 HEMOGLOBIN: CPT

## 2025-08-04 PROCEDURE — 99232 SBSQ HOSP IP/OBS MODERATE 35: CPT | Performed by: UROLOGY

## 2025-08-04 PROCEDURE — G0545 PR INHERENT VISIT TO INPT: HCPCS | Performed by: INTERNAL MEDICINE

## 2025-08-04 PROCEDURE — 76937 US GUIDE VASCULAR ACCESS: CPT | Performed by: INTERNAL MEDICINE

## 2025-08-04 PROCEDURE — C1894 INTRO/SHEATH, NON-LASER: HCPCS

## 2025-08-04 PROCEDURE — 76937 US GUIDE VASCULAR ACCESS: CPT

## 2025-08-04 PROCEDURE — 36558 INSERT TUNNELED CV CATH: CPT

## 2025-08-04 PROCEDURE — 50432 PLMT NEPHROSTOMY CATHETER: CPT

## 2025-08-04 PROCEDURE — 0JH63XZ INSERTION OF TUNNELED VASCULAR ACCESS DEVICE INTO CHEST SUBCUTANEOUS TISSUE AND FASCIA, PERCUTANEOUS APPROACH: ICD-10-PCS | Performed by: INTERNAL MEDICINE

## 2025-08-04 PROCEDURE — 97167 OT EVAL HIGH COMPLEX 60 MIN: CPT

## 2025-08-04 PROCEDURE — 99233 SBSQ HOSP IP/OBS HIGH 50: CPT | Performed by: INTERNAL MEDICINE

## 2025-08-04 PROCEDURE — 99232 SBSQ HOSP IP/OBS MODERATE 35: CPT | Performed by: INTERNAL MEDICINE

## 2025-08-04 PROCEDURE — 85027 COMPLETE CBC AUTOMATED: CPT | Performed by: INTERNAL MEDICINE

## 2025-08-04 PROCEDURE — C1729 CATH, DRAINAGE: HCPCS

## 2025-08-04 PROCEDURE — 76942 ECHO GUIDE FOR BIOPSY: CPT

## 2025-08-04 PROCEDURE — 36558 INSERT TUNNELED CV CATH: CPT | Performed by: INTERNAL MEDICINE

## 2025-08-04 PROCEDURE — 02H633Z INSERTION OF INFUSION DEVICE INTO RIGHT ATRIUM, PERCUTANEOUS APPROACH: ICD-10-PCS | Performed by: INTERNAL MEDICINE

## 2025-08-04 PROCEDURE — 85014 HEMATOCRIT: CPT

## 2025-08-04 PROCEDURE — 77001 FLUOROGUIDE FOR VEIN DEVICE: CPT | Performed by: INTERNAL MEDICINE

## 2025-08-04 PROCEDURE — C1769 GUIDE WIRE: HCPCS

## 2025-08-04 PROCEDURE — 80048 BASIC METABOLIC PNL TOTAL CA: CPT | Performed by: INTERNAL MEDICINE

## 2025-08-04 PROCEDURE — 77001 FLUOROGUIDE FOR VEIN DEVICE: CPT

## 2025-08-04 RX ORDER — FENTANYL CITRATE 50 UG/ML
INJECTION, SOLUTION INTRAMUSCULAR; INTRAVENOUS AS NEEDED
Status: DISCONTINUED | OUTPATIENT
Start: 2025-08-04 | End: 2025-08-04

## 2025-08-04 RX ORDER — ONDANSETRON 2 MG/ML
INJECTION INTRAMUSCULAR; INTRAVENOUS AS NEEDED
Status: DISCONTINUED | OUTPATIENT
Start: 2025-08-04 | End: 2025-08-04

## 2025-08-04 RX ORDER — PROPOFOL 10 MG/ML
INJECTION, EMULSION INTRAVENOUS AS NEEDED
Status: DISCONTINUED | OUTPATIENT
Start: 2025-08-04 | End: 2025-08-04

## 2025-08-04 RX ORDER — HYDROMORPHONE HCL IN WATER/PF 6 MG/30 ML
0.2 PATIENT CONTROLLED ANALGESIA SYRINGE INTRAVENOUS
Status: DISCONTINUED | OUTPATIENT
Start: 2025-08-04 | End: 2025-08-06 | Stop reason: HOSPADM

## 2025-08-04 RX ORDER — LIDOCAINE HYDROCHLORIDE 10 MG/ML
INJECTION, SOLUTION EPIDURAL; INFILTRATION; INTRACAUDAL; PERINEURAL AS NEEDED
Status: DISCONTINUED | OUTPATIENT
Start: 2025-08-04 | End: 2025-08-04

## 2025-08-04 RX ORDER — ROCURONIUM BROMIDE 10 MG/ML
INJECTION, SOLUTION INTRAVENOUS AS NEEDED
Status: DISCONTINUED | OUTPATIENT
Start: 2025-08-04 | End: 2025-08-04

## 2025-08-04 RX ORDER — EPHEDRINE SULFATE 50 MG/ML
INJECTION INTRAVENOUS AS NEEDED
Status: DISCONTINUED | OUTPATIENT
Start: 2025-08-04 | End: 2025-08-04

## 2025-08-04 RX ORDER — FENTANYL CITRATE/PF 50 MCG/ML
50 SYRINGE (ML) INJECTION
Status: DISCONTINUED | OUTPATIENT
Start: 2025-08-04 | End: 2025-08-06 | Stop reason: HOSPADM

## 2025-08-04 RX ORDER — ONDANSETRON 2 MG/ML
4 INJECTION INTRAMUSCULAR; INTRAVENOUS ONCE AS NEEDED
Status: DISCONTINUED | OUTPATIENT
Start: 2025-08-04 | End: 2025-08-06 | Stop reason: HOSPADM

## 2025-08-04 RX ORDER — SODIUM CHLORIDE, SODIUM LACTATE, POTASSIUM CHLORIDE, CALCIUM CHLORIDE 600; 310; 30; 20 MG/100ML; MG/100ML; MG/100ML; MG/100ML
50 INJECTION, SOLUTION INTRAVENOUS CONTINUOUS
Status: DISCONTINUED | OUTPATIENT
Start: 2025-08-04 | End: 2025-08-04

## 2025-08-04 RX ORDER — SODIUM CHLORIDE 9 MG/ML
INJECTION, SOLUTION INTRAVENOUS CONTINUOUS PRN
Status: DISCONTINUED | OUTPATIENT
Start: 2025-08-04 | End: 2025-08-04

## 2025-08-04 RX ADMIN — PHENYLEPHRINE HYDROCHLORIDE 50 MCG/MIN: 50 INJECTION INTRAVENOUS at 13:32

## 2025-08-04 RX ADMIN — ONDANSETRON 4 MG: 2 INJECTION INTRAMUSCULAR; INTRAVENOUS at 13:17

## 2025-08-04 RX ADMIN — Medication 3 MG: at 21:05

## 2025-08-04 RX ADMIN — CEFEPIME 1000 MG: 1 INJECTION, POWDER, FOR SOLUTION INTRAMUSCULAR; INTRAVENOUS at 06:00

## 2025-08-04 RX ADMIN — SODIUM CHLORIDE: 0.9 INJECTION, SOLUTION INTRAVENOUS at 13:00

## 2025-08-04 RX ADMIN — FENTANYL CITRATE 50 MCG: 50 INJECTION INTRAMUSCULAR; INTRAVENOUS at 13:19

## 2025-08-04 RX ADMIN — FOLIC ACID 1 MG: 1 TABLET ORAL at 08:13

## 2025-08-04 RX ADMIN — LIDOCAINE HYDROCHLORIDE 50 MG: 10 INJECTION, SOLUTION EPIDURAL; INFILTRATION; INTRACAUDAL at 13:08

## 2025-08-04 RX ADMIN — CALCIUM ACETATE 1334 MG: 667 CAPSULE ORAL at 08:13

## 2025-08-04 RX ADMIN — CEFEPIME 1000 MG: 1 INJECTION, POWDER, FOR SOLUTION INTRAMUSCULAR; INTRAVENOUS at 16:42

## 2025-08-04 RX ADMIN — ARIPIPRAZOLE 20 MG: 5 TABLET ORAL at 08:13

## 2025-08-04 RX ADMIN — IOHEXOL 20 ML: 350 INJECTION, SOLUTION INTRAVENOUS at 14:48

## 2025-08-04 RX ADMIN — CALCIUM ACETATE 1334 MG: 667 CAPSULE ORAL at 16:42

## 2025-08-04 RX ADMIN — VENLAFAXINE HYDROCHLORIDE 75 MG: 75 CAPSULE, EXTENDED RELEASE ORAL at 08:13

## 2025-08-04 RX ADMIN — FENTANYL CITRATE 50 MCG: 50 INJECTION INTRAMUSCULAR; INTRAVENOUS at 14:03

## 2025-08-04 RX ADMIN — CALCIUM CARBONATE (ANTACID) CHEW TAB 500 MG 1000 MG: 500 CHEW TAB at 08:12

## 2025-08-04 RX ADMIN — PANTOPRAZOLE SODIUM 20 MG: 20 TABLET, DELAYED RELEASE ORAL at 06:00

## 2025-08-04 RX ADMIN — CALCIUM CARBONATE (ANTACID) CHEW TAB 500 MG 1000 MG: 500 CHEW TAB at 16:42

## 2025-08-04 RX ADMIN — PROPOFOL 100 MG: 10 INJECTION, EMULSION INTRAVENOUS at 13:09

## 2025-08-04 RX ADMIN — CALCITRIOL 0.25 MCG: 0.25 CAPSULE, LIQUID FILLED ORAL at 08:13

## 2025-08-04 RX ADMIN — ROCURONIUM 50 MG: 50 INJECTION, SOLUTION INTRAVENOUS at 13:09

## 2025-08-04 RX ADMIN — SUGAMMADEX 200 MG: 100 INJECTION, SOLUTION INTRAVENOUS at 14:48

## 2025-08-04 RX ADMIN — ROCURONIUM 10 MG: 50 INJECTION, SOLUTION INTRAVENOUS at 14:05

## 2025-08-04 RX ADMIN — EPHEDRINE SULFATE 5 MG: 50 INJECTION INTRAVENOUS at 13:50

## 2025-08-05 LAB
ANION GAP SERPL CALCULATED.3IONS-SCNC: 7 MMOL/L (ref 4–13)
ANISOCYTOSIS BLD QL SMEAR: PRESENT
BASOPHILS # BLD MANUAL: 0.22 THOUSAND/UL (ref 0–0.1)
BASOPHILS NFR MAR MANUAL: 2 % (ref 0–1)
BUN SERPL-MCNC: 44 MG/DL (ref 5–25)
CA-I BLD-SCNC: 1.04 MMOL/L (ref 1.12–1.32)
CALCIUM SERPL-MCNC: 7.9 MG/DL (ref 8.4–10.2)
CHLORIDE SERPL-SCNC: 108 MMOL/L (ref 96–108)
CO2 SERPL-SCNC: 23 MMOL/L (ref 21–32)
CREAT SERPL-MCNC: 3.26 MG/DL (ref 0.6–1.3)
EOSINOPHIL # BLD MANUAL: 0.33 THOUSAND/UL (ref 0–0.4)
EOSINOPHIL NFR BLD MANUAL: 3 % (ref 0–6)
ERYTHROCYTE [DISTWIDTH] IN BLOOD BY AUTOMATED COUNT: 15.8 % (ref 11.6–15.1)
GFR SERPL CREATININE-BSD FRML MDRD: 14 ML/MIN/1.73SQ M
GLUCOSE SERPL-MCNC: 96 MG/DL (ref 65–140)
HCT VFR BLD AUTO: 26.3 % (ref 34.8–46.1)
HGB BLD-MCNC: 8 G/DL (ref 11.5–15.4)
LYMPHOCYTES # BLD AUTO: 1.33 THOUSAND/UL (ref 0.6–4.47)
LYMPHOCYTES # BLD AUTO: 11 % (ref 14–44)
MCH RBC QN AUTO: 28.6 PG (ref 26.8–34.3)
MCHC RBC AUTO-ENTMCNC: 30.4 G/DL (ref 31.4–37.4)
MCV RBC AUTO: 94 FL (ref 82–98)
METAMYELOCYTE ABSOLUTE CT: 0.22 THOUSAND/UL (ref 0–0.1)
METAMYELOCYTES NFR BLD MANUAL: 2 % (ref 0–1)
MONOCYTES # BLD AUTO: 1 THOUSAND/UL (ref 0–1.22)
MONOCYTES NFR BLD: 9 % (ref 4–12)
MYELOCYTE ABSOLUTE CT: 0.44 THOUSAND/UL (ref 0–0.1)
MYELOCYTES NFR BLD MANUAL: 4 % (ref 0–1)
NEUTROPHILS # BLD MANUAL: 7.55 THOUSAND/UL (ref 1.85–7.62)
NEUTS BAND NFR BLD MANUAL: 1 % (ref 0–8)
NEUTS SEG NFR BLD AUTO: 67 % (ref 43–75)
PHOSPHATE SERPL-MCNC: 3.8 MG/DL (ref 2.3–4.1)
PLATELET # BLD AUTO: 307 THOUSANDS/UL (ref 149–390)
PLATELET BLD QL SMEAR: ADEQUATE
PMV BLD AUTO: 9.8 FL (ref 8.9–12.7)
POIKILOCYTOSIS BLD QL SMEAR: PRESENT
POTASSIUM SERPL-SCNC: 3.6 MMOL/L (ref 3.5–5.3)
RBC # BLD AUTO: 2.8 MILLION/UL (ref 3.81–5.12)
RBC MORPH BLD: PRESENT
SODIUM SERPL-SCNC: 138 MMOL/L (ref 135–147)
VARIANT LYMPHS # BLD AUTO: 1 %
WBC # BLD AUTO: 11.11 THOUSAND/UL (ref 4.31–10.16)

## 2025-08-05 PROCEDURE — 99233 SBSQ HOSP IP/OBS HIGH 50: CPT | Performed by: INTERNAL MEDICINE

## 2025-08-05 PROCEDURE — 82330 ASSAY OF CALCIUM: CPT | Performed by: INTERNAL MEDICINE

## 2025-08-05 PROCEDURE — 99232 SBSQ HOSP IP/OBS MODERATE 35: CPT | Performed by: INTERNAL MEDICINE

## 2025-08-05 PROCEDURE — 85027 COMPLETE CBC AUTOMATED: CPT | Performed by: INTERNAL MEDICINE

## 2025-08-05 PROCEDURE — 80048 BASIC METABOLIC PNL TOTAL CA: CPT

## 2025-08-05 PROCEDURE — 97163 PT EVAL HIGH COMPLEX 45 MIN: CPT

## 2025-08-05 PROCEDURE — 99232 SBSQ HOSP IP/OBS MODERATE 35: CPT | Performed by: UROLOGY

## 2025-08-05 PROCEDURE — 85007 BL SMEAR W/DIFF WBC COUNT: CPT | Performed by: INTERNAL MEDICINE

## 2025-08-05 PROCEDURE — 99232 SBSQ HOSP IP/OBS MODERATE 35: CPT

## 2025-08-05 PROCEDURE — G0545 PR INHERENT VISIT TO INPT: HCPCS | Performed by: INTERNAL MEDICINE

## 2025-08-05 PROCEDURE — 84100 ASSAY OF PHOSPHORUS: CPT | Performed by: INTERNAL MEDICINE

## 2025-08-05 RX ORDER — HEPARIN SODIUM 5000 [USP'U]/ML
5000 INJECTION, SOLUTION INTRAVENOUS; SUBCUTANEOUS EVERY 8 HOURS SCHEDULED
Status: DISCONTINUED | OUTPATIENT
Start: 2025-08-05 | End: 2025-08-06 | Stop reason: HOSPADM

## 2025-08-05 RX ADMIN — CALCITRIOL 0.25 MCG: 0.25 CAPSULE, LIQUID FILLED ORAL at 09:01

## 2025-08-05 RX ADMIN — CALCIUM CARBONATE (ANTACID) CHEW TAB 500 MG 1000 MG: 500 CHEW TAB at 09:00

## 2025-08-05 RX ADMIN — VENLAFAXINE HYDROCHLORIDE 75 MG: 75 CAPSULE, EXTENDED RELEASE ORAL at 09:02

## 2025-08-05 RX ADMIN — Medication 3 MG: at 21:26

## 2025-08-05 RX ADMIN — FOLIC ACID 1 MG: 1 TABLET ORAL at 09:01

## 2025-08-05 RX ADMIN — CEFEPIME 1000 MG: 1 INJECTION, POWDER, FOR SOLUTION INTRAMUSCULAR; INTRAVENOUS at 05:40

## 2025-08-05 RX ADMIN — PANTOPRAZOLE SODIUM 20 MG: 20 TABLET, DELAYED RELEASE ORAL at 05:48

## 2025-08-05 RX ADMIN — HEPARIN SODIUM 5000 UNITS: 5000 INJECTION INTRAVENOUS; SUBCUTANEOUS at 21:26

## 2025-08-05 RX ADMIN — CALCIUM CARBONATE (ANTACID) CHEW TAB 500 MG 1000 MG: 500 CHEW TAB at 16:15

## 2025-08-05 RX ADMIN — CEFEPIME 1000 MG: 1 INJECTION, POWDER, FOR SOLUTION INTRAMUSCULAR; INTRAVENOUS at 16:15

## 2025-08-05 RX ADMIN — ARIPIPRAZOLE 20 MG: 5 TABLET ORAL at 09:00

## 2025-08-05 RX ADMIN — CALCIUM ACETATE 1334 MG: 667 CAPSULE ORAL at 09:00

## 2025-08-05 RX ADMIN — CALCIUM ACETATE 1334 MG: 667 CAPSULE ORAL at 16:15

## 2025-08-06 VITALS
DIASTOLIC BLOOD PRESSURE: 73 MMHG | TEMPERATURE: 98.8 F | BODY MASS INDEX: 33.53 KG/M2 | WEIGHT: 155.42 LBS | HEIGHT: 57 IN | HEART RATE: 84 BPM | OXYGEN SATURATION: 98 % | SYSTOLIC BLOOD PRESSURE: 118 MMHG | RESPIRATION RATE: 18 BRPM

## 2025-08-06 DIAGNOSIS — N18.4 STAGE 4 CHRONIC KIDNEY DISEASE (HCC): Primary | ICD-10-CM

## 2025-08-06 LAB
ERYTHROCYTE [DISTWIDTH] IN BLOOD BY AUTOMATED COUNT: 15.8 % (ref 11.6–15.1)
HCT VFR BLD AUTO: 27 % (ref 34.8–46.1)
HGB BLD-MCNC: 8.1 G/DL (ref 11.5–15.4)
MCH RBC QN AUTO: 27.8 PG (ref 26.8–34.3)
MCHC RBC AUTO-ENTMCNC: 30 G/DL (ref 31.4–37.4)
MCV RBC AUTO: 93 FL (ref 82–98)
PLATELET # BLD AUTO: 293 THOUSANDS/UL (ref 149–390)
PMV BLD AUTO: 9.7 FL (ref 8.9–12.7)
RBC # BLD AUTO: 2.91 MILLION/UL (ref 3.81–5.12)
WBC # BLD AUTO: 10.55 THOUSAND/UL (ref 4.31–10.16)

## 2025-08-06 PROCEDURE — 99233 SBSQ HOSP IP/OBS HIGH 50: CPT | Performed by: INTERNAL MEDICINE

## 2025-08-06 PROCEDURE — 99232 SBSQ HOSP IP/OBS MODERATE 35: CPT | Performed by: INTERNAL MEDICINE

## 2025-08-06 PROCEDURE — 85027 COMPLETE CBC AUTOMATED: CPT

## 2025-08-06 PROCEDURE — G0545 PR INHERENT VISIT TO INPT: HCPCS | Performed by: INTERNAL MEDICINE

## 2025-08-06 PROCEDURE — 99239 HOSP IP/OBS DSCHRG MGMT >30: CPT

## 2025-08-06 RX ORDER — POLYETHYLENE GLYCOL 3350 17 G/17G
17 POWDER, FOR SOLUTION ORAL DAILY PRN
Qty: 150 G | Refills: 1 | Status: SHIPPED | OUTPATIENT
Start: 2025-08-06 | End: 2025-09-05

## 2025-08-06 RX ORDER — CLOPIDOGREL BISULFATE 75 MG/1
75 TABLET ORAL DAILY
Qty: 30 TABLET | Refills: 1 | Status: SHIPPED | OUTPATIENT
Start: 2025-08-07

## 2025-08-06 RX ORDER — AMOXICILLIN 250 MG
2 CAPSULE ORAL 2 TIMES DAILY PRN
Qty: 60 TABLET | Refills: 1 | Status: SHIPPED | OUTPATIENT
Start: 2025-08-06 | End: 2025-09-05

## 2025-08-06 RX ADMIN — CLOPIDOGREL 75 MG: 75 TABLET ORAL at 08:31

## 2025-08-06 RX ADMIN — CALCIUM ACETATE 1334 MG: 667 CAPSULE ORAL at 08:31

## 2025-08-06 RX ADMIN — CALCITRIOL 0.25 MCG: 0.25 CAPSULE, LIQUID FILLED ORAL at 08:31

## 2025-08-06 RX ADMIN — CALCIUM CARBONATE (ANTACID) CHEW TAB 500 MG 1000 MG: 500 CHEW TAB at 08:31

## 2025-08-06 RX ADMIN — FOLIC ACID 1 MG: 1 TABLET ORAL at 08:31

## 2025-08-06 RX ADMIN — PANTOPRAZOLE SODIUM 20 MG: 20 TABLET, DELAYED RELEASE ORAL at 05:33

## 2025-08-06 RX ADMIN — HEPARIN SODIUM 5000 UNITS: 5000 INJECTION INTRAVENOUS; SUBCUTANEOUS at 05:33

## 2025-08-06 RX ADMIN — CEFEPIME 1000 MG: 1 INJECTION, POWDER, FOR SOLUTION INTRAMUSCULAR; INTRAVENOUS at 05:33

## 2025-08-06 RX ADMIN — VENLAFAXINE HYDROCHLORIDE 75 MG: 75 CAPSULE, EXTENDED RELEASE ORAL at 08:31

## 2025-08-06 RX ADMIN — LORATADINE 10 MG: 10 TABLET ORAL at 08:31

## 2025-08-06 RX ADMIN — ARIPIPRAZOLE 20 MG: 5 TABLET ORAL at 08:31

## 2025-08-07 ENCOUNTER — TELEPHONE (OUTPATIENT)
Dept: NEPHROLOGY | Facility: CLINIC | Age: 70
End: 2025-08-07

## 2025-08-07 DIAGNOSIS — A49.8 PSEUDOMONAS INFECTION: ICD-10-CM

## 2025-08-07 DIAGNOSIS — N39.0 URINARY TRACT INFECTION WITH HEMATURIA, SITE UNSPECIFIED: Primary | ICD-10-CM

## 2025-08-07 DIAGNOSIS — R31.9 URINARY TRACT INFECTION WITH HEMATURIA, SITE UNSPECIFIED: Primary | ICD-10-CM

## 2025-08-08 ENCOUNTER — TELEPHONE (OUTPATIENT)
Dept: INFECTIOUS DISEASES | Facility: CLINIC | Age: 70
End: 2025-08-08

## 2025-08-08 ENCOUNTER — TELEPHONE (OUTPATIENT)
Dept: VASCULAR SURGERY | Facility: CLINIC | Age: 70
End: 2025-08-08

## 2025-08-11 PROBLEM — T82.7XXA VASCULAR GRAFT INFECTION (HCC): Status: ACTIVE | Noted: 2025-08-11

## 2025-08-13 ENCOUNTER — HOSPITAL ENCOUNTER (OUTPATIENT)
Dept: INFUSION CENTER | Facility: CLINIC | Age: 70
End: 2025-08-13

## 2025-08-14 ENCOUNTER — OFFICE VISIT (OUTPATIENT)
Dept: INFECTIOUS DISEASES | Facility: CLINIC | Age: 70
End: 2025-08-14

## 2025-08-19 ENCOUNTER — TELEPHONE (OUTPATIENT)
Age: 70
End: 2025-08-19

## 2025-08-22 ENCOUNTER — HOME CARE VISIT (OUTPATIENT)
Dept: HOME HEALTH SERVICES | Facility: HOME HEALTHCARE | Age: 70
End: 2025-08-22
Payer: COMMERCIAL

## 2025-08-22 ENCOUNTER — HOME CARE VISIT (OUTPATIENT)
Dept: HOME HEALTH SERVICES | Facility: HOME HEALTHCARE | Age: 70
End: 2025-08-22
Attending: EMERGENCY MEDICINE
Payer: COMMERCIAL

## 2025-08-22 ENCOUNTER — HOME CARE VISIT (OUTPATIENT)
Dept: HOME HEALTH SERVICES | Facility: HOME HEALTHCARE | Age: 70
End: 2025-08-22

## 2025-08-22 ENCOUNTER — TELEPHONE (OUTPATIENT)
Dept: OTHER | Facility: OTHER | Age: 70
End: 2025-08-22

## 2025-08-22 VITALS
TEMPERATURE: 97.6 F | DIASTOLIC BLOOD PRESSURE: 80 MMHG | BODY MASS INDEX: 32.36 KG/M2 | HEART RATE: 77 BPM | WEIGHT: 150 LBS | OXYGEN SATURATION: 100 % | HEIGHT: 57 IN | SYSTOLIC BLOOD PRESSURE: 120 MMHG | RESPIRATION RATE: 18 BRPM

## 2025-08-22 PROCEDURE — G0299 HHS/HOSPICE OF RN EA 15 MIN: HCPCS

## 2025-08-23 ENCOUNTER — HOME CARE VISIT (OUTPATIENT)
Dept: HOME HEALTH SERVICES | Facility: HOME HEALTHCARE | Age: 70
End: 2025-08-23
Payer: COMMERCIAL

## 2025-08-23 VITALS
RESPIRATION RATE: 18 BRPM | OXYGEN SATURATION: 100 % | DIASTOLIC BLOOD PRESSURE: 75 MMHG | HEART RATE: 78 BPM | SYSTOLIC BLOOD PRESSURE: 122 MMHG | TEMPERATURE: 97.6 F

## 2025-08-23 PROCEDURE — G0299 HHS/HOSPICE OF RN EA 15 MIN: HCPCS

## 2025-08-26 PROBLEM — N39.0 URINARY TRACT INFECTION WITH HEMATURIA: Status: RESOLVED | Noted: 2020-08-06 | Resolved: 2025-08-26

## 2025-08-26 PROBLEM — R31.9 URINARY TRACT INFECTION WITH HEMATURIA: Status: RESOLVED | Noted: 2020-08-06 | Resolved: 2025-08-26

## 2025-08-29 ENCOUNTER — RX CHECK (OUTPATIENT)
Dept: URBAN - METROPOLITAN AREA CLINIC 6 | Facility: CLINIC | Age: 70
End: 2025-08-29

## 2025-08-29 DIAGNOSIS — H52.4: ICD-10-CM

## 2025-08-29 PROCEDURE — 92015 DETERMINE REFRACTIVE STATE: CPT

## 2025-08-29 ASSESSMENT — KERATOMETRY
OS_AXISANGLE_DEGREES: 76
OS_AXISANGLE2_DEGREES: 166
OS_K1POWER_DIOPTERS: 43.75
OD_K1POWER_DIOPTERS: 43.75
OD_AXISANGLE2_DEGREES: 5
OD_K2POWER_DIOPTERS: 44.25
OS_K2POWER_DIOPTERS: 44.50
OD_AXISANGLE_DEGREES: 95

## 2025-08-29 ASSESSMENT — VISUAL ACUITY
OD_PH: 20/40+1
OU_CC: J1
OS_CC: 20/40+2
OD_CC: 20/40-1

## 2025-08-29 ASSESSMENT — TONOMETRY
OD_IOP_MMHG: 8
OS_IOP_MMHG: 9

## (undated) DEVICE — PROXIMATE LINEAR CUTTER RELOAD, BLUE, 75MM: Brand: PROXIMATE

## (undated) DEVICE — AEM CORD

## (undated) DEVICE — VIAL DECANTER

## (undated) DEVICE — PREMIUM DRY TRAY LF: Brand: MEDLINE INDUSTRIES, INC.

## (undated) DEVICE — DRAPE FLUID WARMER (BIRD BATH)

## (undated) DEVICE — CHLORAPREP HI-LITE 26ML ORANGE

## (undated) DEVICE — STERILE ICS MINOR PACK: Brand: CARDINAL HEALTH

## (undated) DEVICE — STERILE CYSTO PACK: Brand: CARDINAL HEALTH

## (undated) DEVICE — PROXIMATE PLUS MD MULTI-DIRECTIONAL RELEASE SKIN STAPLERS CONTAINS 35 STAINLESS STEEL STAPLES APPROXIMATE CLOSED DIMENSIONS: 6.9MM X 3.9MM WIDE: Brand: PROXIMATE

## (undated) DEVICE — SUT STRATAFIX SPIRAL 2-0 CT-1 20 CM SXPD1B400

## (undated) DEVICE — SPECIMEN CONTAINER STERILE PEEL PACK

## (undated) DEVICE — SUT MONOCRYL 4-0 RB-1 27 IN Y304H

## (undated) DEVICE — GLOVE SRG LF STRL BGL SKNSNS 7.5 PF

## (undated) DEVICE — SUT ETHILON 2-0 FSLX 30 IN 1674H

## (undated) DEVICE — GLOVE SRG BIOGEL ECLIPSE 7.5

## (undated) DEVICE — INTENDED FOR TISSUE SEPARATION, AND OTHER PROCEDURES THAT REQUIRE A SHARP SURGICAL BLADE TO PUNCTURE OR CUT.: Brand: BARD-PARKER SAFETY BLADES SIZE 15, STERILE

## (undated) DEVICE — ANTIBACTERIAL UNDYED BRAIDED (POLYGLACTIN 910), SYNTHETIC ABSORBABLE SUTURE: Brand: COATED VICRYL

## (undated) DEVICE — ANTI-FOG SOLUTION WITH FOAM PAD: Brand: DEVON

## (undated) DEVICE — OCCLUDER COLPO-PNEUMO

## (undated) DEVICE — SPONGE 4 X 4 XRAY 16 PLY STRL LF RFD

## (undated) DEVICE — DRAPE SHEET THREE QUARTER

## (undated) DEVICE — 3M™ TEGADERM™ TRANSPARENT FILM DRESSING FRAME STYLE, 1628, 6 IN X 8 IN (15 CM X 20 CM), 10/CT 8CT/CASE: Brand: 3M™ TEGADERM™

## (undated) DEVICE — SUT CHROMIC 3-0 SH 27 IN G122H

## (undated) DEVICE — AIRSEAL TUBE SMOKE EVAC LUMENX3 FILTERED

## (undated) DEVICE — PACK TUR

## (undated) DEVICE — ADHESIVE SKN CLSR HISTOACRYL FLEX 0.5ML LF

## (undated) DEVICE — Device

## (undated) DEVICE — ENDOPATH XCEL BLADELESS TROCARS WITH STABILITY SLEEVES: Brand: ENDOPATH XCEL

## (undated) DEVICE — BULB SYRINGE,IRRIGATION WITH PROTECTIVE CAP: Brand: DOVER

## (undated) DEVICE — TUBING SUCTION 5MM X 12 FT

## (undated) DEVICE — NEEDLE 25G X 1 1/2

## (undated) DEVICE — POOLE SUCTION HANDLE: Brand: CARDINAL HEALTH

## (undated) DEVICE — VESSEL LOOPS X-RAY DETECTABLE: Brand: DEROYAL

## (undated) DEVICE — UROLOGIC DRAIN BAG: Brand: UNBRANDED

## (undated) DEVICE — SYRINGE 5ML LL

## (undated) DEVICE — SPECIMEN TRAP: Brand: ARGYLE

## (undated) DEVICE — COVER PROBE INTRAOPERATIVE 6 X 96 IN

## (undated) DEVICE — ENSEAL LAPAROSCOPIC TISSUE SEALER G2 ARTICULATING CURVED JAW FOR USE WITH G2 GENERATOR 5MM DIAMETER 45CM SHAFT LENGTH: Brand: ENSEAL

## (undated) DEVICE — ARM DRAPE

## (undated) DEVICE — TOWEL SURG XR DETECT GREEN STRL RFD

## (undated) DEVICE — GLOVE SRG BIOGEL 6.5

## (undated) DEVICE — SUT VICRYL 2-0 CT-1 27 IN J259H

## (undated) DEVICE — CHLORHEXIDINE 4PCT 4 OZ

## (undated) DEVICE — MONOPOLAR CURVED SCISSORS: Brand: ENDOWRIST

## (undated) DEVICE — GLOVE INDICATOR PI UNDERGLOVE SZ 7 BLUE

## (undated) DEVICE — MASTISOL LIQ ADHESIVE 2/3ML

## (undated) DEVICE — IRRIG ENDO FLO TUBING

## (undated) DEVICE — TIP COVER ACCESSORY

## (undated) DEVICE — TRAY FOLEY 16FR URIMETER SILICONE SURESTEP

## (undated) DEVICE — GLOVE INDICATOR PI UNDERGLOVE SZ 8 BLUE

## (undated) DEVICE — ENSEAL 20 CM SHAFT, LARGE JAW: Brand: ENSEAL X1

## (undated) DEVICE — MINOR PROCEDURE DRAPE: Brand: CONVERTORS

## (undated) DEVICE — SUT MONOCRYL 3-0 RB-1 27 IN Y215H

## (undated) DEVICE — STERILE SURGICAL LUBRICANT,  TUBE: Brand: SURGILUBE

## (undated) DEVICE — LARGE NEEDLE DRIVER: Brand: ENDOWRIST

## (undated) DEVICE — SYRINGE 20ML LL

## (undated) DEVICE — BAG URINE DRAINAGE URIMETER 350ML LF

## (undated) DEVICE — MAYO STAND COVER: Brand: CONVERTORS

## (undated) DEVICE — GUIDEWIRE STRGHT TIP 0.035 IN  SOLO PLUS

## (undated) DEVICE — INTENDED FOR TISSUE SEPARATION, AND OTHER PROCEDURES THAT REQUIRE A SHARP SURGICAL BLADE TO PUNCTURE OR CUT.: Brand: BARD-PARKER SAFETY BLADES SIZE 11, STERILE

## (undated) DEVICE — SUT MONOCRYL 4-0 PS-2 27 IN Y426H

## (undated) DEVICE — GAUZE SPONGES,16 PLY: Brand: CURITY

## (undated) DEVICE — SUT SILK 3-0 SH CR/8 18 IN C013D

## (undated) DEVICE — GAUZE SPONGES,USP TYPE VII GAUZE, 12 PLY: Brand: CURITY

## (undated) DEVICE — 1820 FOAM BLOCK NEEDLE COUNTER: Brand: DEVON

## (undated) DEVICE — 3000CC GUARDIAN II: Brand: GUARDIAN

## (undated) DEVICE — PACK ROBOTIC PROSTATE PBDS DA VINCI SI/XI

## (undated) DEVICE — DRAPE TOWEL: Brand: CONVERTORS

## (undated) DEVICE — PROGRASP FORCEPS: Brand: ENDOWRIST

## (undated) DEVICE — ULTRASOUND GEL STERILE FOIL PK

## (undated) DEVICE — ALL PURPOSE SPONGES,NONWOVEN, 4 PLY: Brand: CURITY

## (undated) DEVICE — SURGICEL 4 X 8IN

## (undated) DEVICE — CATH FOLEY 24FR 30ML 2 WAY SILICONE ELASTIMER

## (undated) DEVICE — UROSTOMY BAG W VALVE

## (undated) DEVICE — ADHESIVE SKIN HIGH VISCOSITY EXOFIN 1ML

## (undated) DEVICE — BAG DECANTER

## (undated) DEVICE — STRL COTTON TIP APPLCTR 6IN PK: Brand: CARDINAL HEALTH

## (undated) DEVICE — JP PERF DRN SIL FLT 10MM FULL: Brand: CARDINAL HEALTH

## (undated) DEVICE — SYRINGE 50ML LL

## (undated) DEVICE — SPONGE LAP 18 X 18 IN

## (undated) DEVICE — CATH URETERAL 5FR X 70 CM FLEX TIP POLYUR BARD

## (undated) DEVICE — SUT VICRYL 0 UR-6 27 IN J603H

## (undated) DEVICE — CLAMP TOWEL TUBING DISPOSABLE

## (undated) DEVICE — MEDI-VAC YANK SUCT HNDL W/TPRD BULBOUS TIP: Brand: CARDINAL HEALTH

## (undated) DEVICE — LUBRICANT INST ELECTROLUBE ANTISTK WO PAD

## (undated) DEVICE — LUBRICANT JELLY SURGILUBE TUBE 2OZ FLIP TOP

## (undated) DEVICE — SUT VICRYL 3-0 SH 27 IN J416H

## (undated) DEVICE — ENDOPATH XCEL UNIVERSAL TROCAR STABLILITY SLEEVES: Brand: ENDOPATH XCEL

## (undated) DEVICE — ANTIBACTERIAL VIOLET BRAIDED (POLYGLACTIN 910), SYNTHETIC ABSORBABLE SUTURE: Brand: COATED VICRYL

## (undated) DEVICE — SUT PDS II 1 XLH 96 IN LOOPED Z881G

## (undated) DEVICE — UTERINE MANIPULATOR RUMI 5.1 X 6 CM

## (undated) DEVICE — TELFA NON-ADHERENT ABSORBENT DRESSING: Brand: TELFA

## (undated) DEVICE — SYRINGE 10ML LL

## (undated) DEVICE — GLOVE SRG BIOGEL 7

## (undated) DEVICE — ENDOPOUCH RETRIEVER SPECIMEN RETRIEVAL BAGS: Brand: ENDOPOUCH RETRIEVER

## (undated) DEVICE — FENESTRATED BIPOLAR FORCEPS: Brand: ENDOWRIST

## (undated) DEVICE — INVIEW CLEAR LEGGINGS: Brand: CONVERTORS

## (undated) DEVICE — STERILE LAP LITHOTOMY PACK: Brand: CARDINAL HEALTH

## (undated) DEVICE — JACKSON-PRATT 100CC BULB RESERVOIR: Brand: CARDINAL HEALTH

## (undated) DEVICE — CANNULA SEAL

## (undated) DEVICE — BETHLEHEM MAJOR GENERAL PACK: Brand: CARDINAL HEALTH

## (undated) DEVICE — SKIN MARKER DUAL TIP WITH RULER CAP, FLEXIBLE RULER AND LABELS: Brand: DEVON

## (undated) DEVICE — COLUMN DRAPE

## (undated) DEVICE — SUT ETHILON 3-0 FS-1 18 IN 663G

## (undated) DEVICE — TROCAR PORT ACCESS 12 X120MM W/BLDLS OPTICAL TIP AIRSEAL

## (undated) DEVICE — INSUFFLATION TUBING PRIMFLO

## (undated) DEVICE — SUT VICRYL 2-0 SH 27 IN UNDYED J417H

## (undated) DEVICE — PROXIMATE RELOADABLE LINEAR CUTTER WITH SAFETY LOCK-OUT, 75MM: Brand: PROXIMATE

## (undated) DEVICE — INTENDED FOR TISSUE SEPARATION, AND OTHER PROCEDURES THAT REQUIRE A SHARP SURGICAL BLADE TO PUNCTURE OR CUT.: Brand: BARD-PARKER SAFETY BLADES SIZE 10, STERILE